# Patient Record
Sex: FEMALE | Race: WHITE | NOT HISPANIC OR LATINO | URBAN - METROPOLITAN AREA
[De-identification: names, ages, dates, MRNs, and addresses within clinical notes are randomized per-mention and may not be internally consistent; named-entity substitution may affect disease eponyms.]

---

## 2013-10-17 RX ORDER — OXYCODONE HYDROCHLORIDE 5 MG/1
1 TABLET ORAL
Qty: 0 | Refills: 0 | COMMUNITY
Start: 2013-10-17 | End: 2013-10-24

## 2016-02-04 RX ORDER — ASPIRIN/CALCIUM CARB/MAGNESIUM 324 MG
1 TABLET ORAL
Qty: 0 | Refills: 0 | COMMUNITY
Start: 2016-02-04

## 2016-10-31 RX ORDER — INSULIN ASPART 100 [IU]/ML
1 INJECTION, SOLUTION SUBCUTANEOUS
Qty: 0 | Refills: 0 | COMMUNITY
Start: 2016-10-31

## 2017-01-11 ENCOUNTER — INPATIENT (INPATIENT)
Facility: HOSPITAL | Age: 62
LOS: 3 days | Discharge: HOME | DRG: 981 | End: 2017-01-15
Attending: INTERNAL MEDICINE | Admitting: GENERAL ACUTE CARE HOSPITAL
Payer: MEDICARE

## 2017-01-11 VITALS
RESPIRATION RATE: 19 BRPM | HEART RATE: 81 BPM | DIASTOLIC BLOOD PRESSURE: 62 MMHG | SYSTOLIC BLOOD PRESSURE: 153 MMHG | OXYGEN SATURATION: 99 %

## 2017-01-11 DIAGNOSIS — E13.10 OTHER SPECIFIED DIABETES MELLITUS WITH KETOACIDOSIS WITHOUT COMA: ICD-10-CM

## 2017-01-11 DIAGNOSIS — N18.6 END STAGE RENAL DISEASE: ICD-10-CM

## 2017-01-11 DIAGNOSIS — E78.5 HYPERLIPIDEMIA, UNSPECIFIED: ICD-10-CM

## 2017-01-11 DIAGNOSIS — E10.10 TYPE 1 DIABETES MELLITUS WITH KETOACIDOSIS WITHOUT COMA: ICD-10-CM

## 2017-01-11 DIAGNOSIS — I73.9 PERIPHERAL VASCULAR DISEASE, UNSPECIFIED: ICD-10-CM

## 2017-01-11 DIAGNOSIS — I10 ESSENTIAL (PRIMARY) HYPERTENSION: ICD-10-CM

## 2017-01-11 DIAGNOSIS — Z98.89 OTHER SPECIFIED POSTPROCEDURAL STATES: Chronic | ICD-10-CM

## 2017-01-11 DIAGNOSIS — I25.10 ATHEROSCLEROTIC HEART DISEASE OF NATIVE CORONARY ARTERY WITHOUT ANGINA PECTORIS: ICD-10-CM

## 2017-01-11 DIAGNOSIS — I82.90 ACUTE EMBOLISM AND THROMBOSIS OF UNSPECIFIED VEIN: ICD-10-CM

## 2017-01-11 DIAGNOSIS — I24.9 ACUTE ISCHEMIC HEART DISEASE, UNSPECIFIED: ICD-10-CM

## 2017-01-11 LAB
ACETONE SERPL-MCNC: ABNORMAL
ALBUMIN SERPL ELPH-MCNC: 4.2 G/DL — SIGNIFICANT CHANGE UP (ref 3.3–5)
ALP SERPL-CCNC: 211 U/L — HIGH (ref 40–120)
ALT FLD-CCNC: 13 U/L RC — SIGNIFICANT CHANGE UP (ref 10–45)
ANION GAP SERPL CALC-SCNC: 14 MMOL/L — SIGNIFICANT CHANGE UP (ref 5–17)
ANION GAP SERPL CALC-SCNC: 19 MMOL/L — HIGH (ref 5–17)
ANION GAP SERPL CALC-SCNC: 27 MMOL/L — HIGH (ref 5–17)
ANION GAP SERPL CALC-SCNC: 27 MMOL/L — HIGH (ref 5–17)
ANION GAP SERPL CALC-SCNC: 30 MMOL/L — HIGH (ref 5–17)
APTT BLD: 32.2 SEC — SIGNIFICANT CHANGE UP (ref 27.5–37.4)
AST SERPL-CCNC: 15 U/L — SIGNIFICANT CHANGE UP (ref 10–40)
BASOPHILS # BLD AUTO: 0 K/UL — SIGNIFICANT CHANGE UP (ref 0–0.2)
BASOPHILS NFR BLD AUTO: 0.4 % — SIGNIFICANT CHANGE UP (ref 0–2)
BILIRUB SERPL-MCNC: 0.4 MG/DL — SIGNIFICANT CHANGE UP (ref 0.2–1.2)
BUN SERPL-MCNC: 19 MG/DL — SIGNIFICANT CHANGE UP (ref 7–23)
BUN SERPL-MCNC: 34 MG/DL — HIGH (ref 7–23)
BUN SERPL-MCNC: 37 MG/DL — HIGH (ref 7–23)
BUN SERPL-MCNC: 42 MG/DL — HIGH (ref 7–23)
BUN SERPL-MCNC: 9 MG/DL — SIGNIFICANT CHANGE UP (ref 7–23)
CALCIUM SERPL-MCNC: 8.9 MG/DL — SIGNIFICANT CHANGE UP (ref 8.4–10.5)
CALCIUM SERPL-MCNC: 9.2 MG/DL — SIGNIFICANT CHANGE UP (ref 8.4–10.5)
CALCIUM SERPL-MCNC: 9.4 MG/DL — SIGNIFICANT CHANGE UP (ref 8.4–10.5)
CALCIUM SERPL-MCNC: 9.5 MG/DL — SIGNIFICANT CHANGE UP (ref 8.4–10.5)
CALCIUM SERPL-MCNC: 9.9 MG/DL — SIGNIFICANT CHANGE UP (ref 8.4–10.5)
CHLORIDE SERPL-SCNC: 85 MMOL/L — LOW (ref 96–108)
CHLORIDE SERPL-SCNC: 85 MMOL/L — LOW (ref 96–108)
CHLORIDE SERPL-SCNC: 87 MMOL/L — LOW (ref 96–108)
CHLORIDE SERPL-SCNC: 94 MMOL/L — LOW (ref 96–108)
CHLORIDE SERPL-SCNC: 95 MMOL/L — LOW (ref 96–108)
CK MB BLD-MCNC: 3.1 % — SIGNIFICANT CHANGE UP (ref 0–3.5)
CK MB BLD-MCNC: 8.5 % — HIGH (ref 0–3.5)
CK MB BLD-MCNC: 9.6 % — HIGH (ref 0–3.5)
CK MB CFR SERPL CALC: 25.8 NG/ML — HIGH (ref 0–3.8)
CK MB CFR SERPL CALC: 3.5 NG/ML — SIGNIFICANT CHANGE UP (ref 0–3.8)
CK MB CFR SERPL CALC: 32.7 NG/ML — HIGH (ref 0–3.8)
CK SERPL-CCNC: 113 U/L — SIGNIFICANT CHANGE UP (ref 25–170)
CK SERPL-CCNC: 304 U/L — HIGH (ref 25–170)
CK SERPL-CCNC: 341 U/L — HIGH (ref 25–170)
CO2 SERPL-SCNC: 19 MMOL/L — LOW (ref 22–31)
CO2 SERPL-SCNC: 20 MMOL/L — LOW (ref 22–31)
CO2 SERPL-SCNC: 21 MMOL/L — LOW (ref 22–31)
CO2 SERPL-SCNC: 25 MMOL/L — SIGNIFICANT CHANGE UP (ref 22–31)
CO2 SERPL-SCNC: 30 MMOL/L — SIGNIFICANT CHANGE UP (ref 22–31)
CREAT SERPL-MCNC: 2.83 MG/DL — HIGH (ref 0.5–1.3)
CREAT SERPL-MCNC: 3.54 MG/DL — HIGH (ref 0.5–1.3)
CREAT SERPL-MCNC: 6.42 MG/DL — HIGH (ref 0.5–1.3)
CREAT SERPL-MCNC: 6.59 MG/DL — HIGH (ref 0.5–1.3)
CREAT SERPL-MCNC: 7 MG/DL — HIGH (ref 0.5–1.3)
EOSINOPHIL # BLD AUTO: 0 K/UL — SIGNIFICANT CHANGE UP (ref 0–0.5)
EOSINOPHIL NFR BLD AUTO: 0.4 % — SIGNIFICANT CHANGE UP (ref 0–6)
GAS PNL BLDV: SIGNIFICANT CHANGE UP
GLUCOSE SERPL-MCNC: 164 MG/DL — HIGH (ref 70–99)
GLUCOSE SERPL-MCNC: 260 MG/DL — HIGH (ref 70–99)
GLUCOSE SERPL-MCNC: 663 MG/DL — CRITICAL HIGH (ref 70–99)
GLUCOSE SERPL-MCNC: 777 MG/DL — CRITICAL HIGH (ref 70–99)
GLUCOSE SERPL-MCNC: 819 MG/DL — CRITICAL HIGH (ref 70–99)
HAV IGM SER-ACNC: SIGNIFICANT CHANGE UP
HBV CORE AB SER-ACNC: SIGNIFICANT CHANGE UP
HBV CORE IGM SER-ACNC: SIGNIFICANT CHANGE UP
HBV SURFACE AB SER-ACNC: <3 MIU/ML — LOW
HBV SURFACE AG SER-ACNC: SIGNIFICANT CHANGE UP
HCT VFR BLD CALC: 27.8 % — LOW (ref 34.5–45)
HCV AB S/CO SERPL IA: 0.14 S/CO — SIGNIFICANT CHANGE UP
HCV AB SERPL-IMP: SIGNIFICANT CHANGE UP
HGB BLD-MCNC: 8.9 G/DL — LOW (ref 11.5–15.5)
INR BLD: 1.06 RATIO — SIGNIFICANT CHANGE UP (ref 0.88–1.16)
LYMPHOCYTES # BLD AUTO: 0.9 K/UL — LOW (ref 1–3.3)
LYMPHOCYTES # BLD AUTO: 11.7 % — LOW (ref 13–44)
MAGNESIUM SERPL-MCNC: 1.9 MG/DL — SIGNIFICANT CHANGE UP (ref 1.6–2.6)
MAGNESIUM SERPL-MCNC: 2.1 MG/DL — SIGNIFICANT CHANGE UP (ref 1.6–2.6)
MCHC RBC-ENTMCNC: 32.2 GM/DL — SIGNIFICANT CHANGE UP (ref 32–36)
MCHC RBC-ENTMCNC: 34.4 PG — HIGH (ref 27–34)
MCV RBC AUTO: 107 FL — HIGH (ref 80–100)
MONOCYTES # BLD AUTO: 0.5 K/UL — SIGNIFICANT CHANGE UP (ref 0–0.9)
MONOCYTES NFR BLD AUTO: 6.1 % — SIGNIFICANT CHANGE UP (ref 2–14)
NEUTROPHILS # BLD AUTO: 6.4 K/UL — SIGNIFICANT CHANGE UP (ref 1.8–7.4)
NEUTROPHILS NFR BLD AUTO: 81.4 % — HIGH (ref 43–77)
NT-PROBNP SERPL-SCNC: HIGH PG/ML (ref 0–300)
PHOSPHATE SERPL-MCNC: 2.3 MG/DL — LOW (ref 2.5–4.5)
PHOSPHATE SERPL-MCNC: 4.8 MG/DL — HIGH (ref 2.5–4.5)
PLATELET # BLD AUTO: 217 K/UL — SIGNIFICANT CHANGE UP (ref 150–400)
POTASSIUM SERPL-MCNC: 3.4 MMOL/L — LOW (ref 3.5–5.3)
POTASSIUM SERPL-MCNC: 4.5 MMOL/L — SIGNIFICANT CHANGE UP (ref 3.5–5.3)
POTASSIUM SERPL-MCNC: 4.7 MMOL/L — SIGNIFICANT CHANGE UP (ref 3.5–5.3)
POTASSIUM SERPL-MCNC: 5.1 MMOL/L — SIGNIFICANT CHANGE UP (ref 3.5–5.3)
POTASSIUM SERPL-MCNC: 6.9 MMOL/L — CRITICAL HIGH (ref 3.5–5.3)
POTASSIUM SERPL-SCNC: 3.4 MMOL/L — LOW (ref 3.5–5.3)
POTASSIUM SERPL-SCNC: 4.5 MMOL/L — SIGNIFICANT CHANGE UP (ref 3.5–5.3)
POTASSIUM SERPL-SCNC: 4.7 MMOL/L — SIGNIFICANT CHANGE UP (ref 3.5–5.3)
POTASSIUM SERPL-SCNC: 5.1 MMOL/L — SIGNIFICANT CHANGE UP (ref 3.5–5.3)
POTASSIUM SERPL-SCNC: 6.9 MMOL/L — CRITICAL HIGH (ref 3.5–5.3)
PROT SERPL-MCNC: 7.2 G/DL — SIGNIFICANT CHANGE UP (ref 6–8.3)
PROTHROM AB SERPL-ACNC: 11.6 SEC — SIGNIFICANT CHANGE UP (ref 10–13.1)
RAPID RVP RESULT: SIGNIFICANT CHANGE UP
RBC # BLD: 2.6 M/UL — LOW (ref 3.8–5.2)
RBC # FLD: 12 % — SIGNIFICANT CHANGE UP (ref 10.3–14.5)
SODIUM SERPL-SCNC: 132 MMOL/L — LOW (ref 135–145)
SODIUM SERPL-SCNC: 134 MMOL/L — LOW (ref 135–145)
SODIUM SERPL-SCNC: 135 MMOL/L — SIGNIFICANT CHANGE UP (ref 135–145)
SODIUM SERPL-SCNC: 138 MMOL/L — SIGNIFICANT CHANGE UP (ref 135–145)
SODIUM SERPL-SCNC: 139 MMOL/L — SIGNIFICANT CHANGE UP (ref 135–145)
TROPONIN T SERPL-MCNC: 0.13 NG/ML — HIGH (ref 0–0.06)
TROPONIN T SERPL-MCNC: 0.84 NG/ML — HIGH (ref 0–0.06)
TROPONIN T SERPL-MCNC: 1.55 NG/ML — HIGH (ref 0–0.06)
WBC # BLD: 7.9 K/UL — SIGNIFICANT CHANGE UP (ref 3.8–10.5)
WBC # FLD AUTO: 7.9 K/UL — SIGNIFICANT CHANGE UP (ref 3.8–10.5)

## 2017-01-11 PROCEDURE — 93306 TTE W/DOPPLER COMPLETE: CPT | Mod: 26

## 2017-01-11 PROCEDURE — 99223 1ST HOSP IP/OBS HIGH 75: CPT

## 2017-01-11 PROCEDURE — 99291 CRITICAL CARE FIRST HOUR: CPT | Mod: 25,GC

## 2017-01-11 PROCEDURE — 71275 CT ANGIOGRAPHY CHEST: CPT | Mod: 26

## 2017-01-11 PROCEDURE — 71010: CPT | Mod: 26

## 2017-01-11 PROCEDURE — 93010 ELECTROCARDIOGRAM REPORT: CPT

## 2017-01-11 PROCEDURE — 74174 CTA ABD&PLVS W/CONTRAST: CPT | Mod: 26

## 2017-01-11 PROCEDURE — 99291 CRITICAL CARE FIRST HOUR: CPT

## 2017-01-11 RX ORDER — INSULIN LISPRO 100/ML
3 VIAL (ML) SUBCUTANEOUS
Qty: 0 | Refills: 0 | Status: DISCONTINUED | OUTPATIENT
Start: 2017-01-11 | End: 2017-01-12

## 2017-01-11 RX ORDER — DULOXETINE HYDROCHLORIDE 30 MG/1
60 CAPSULE, DELAYED RELEASE ORAL DAILY
Qty: 0 | Refills: 0 | Status: DISCONTINUED | OUTPATIENT
Start: 2017-01-11 | End: 2017-01-15

## 2017-01-11 RX ORDER — INSULIN HUMAN 100 [IU]/ML
10 INJECTION, SOLUTION SUBCUTANEOUS
Qty: 100 | Refills: 0 | Status: DISCONTINUED | OUTPATIENT
Start: 2017-01-11 | End: 2017-01-11

## 2017-01-11 RX ORDER — INSULIN GLARGINE 100 [IU]/ML
12 INJECTION, SOLUTION SUBCUTANEOUS ONCE
Qty: 0 | Refills: 0 | Status: COMPLETED | OUTPATIENT
Start: 2017-01-11 | End: 2017-01-11

## 2017-01-11 RX ORDER — INSULIN GLARGINE 100 [IU]/ML
12 INJECTION, SOLUTION SUBCUTANEOUS AT BEDTIME
Qty: 0 | Refills: 0 | Status: DISCONTINUED | OUTPATIENT
Start: 2017-01-12 | End: 2017-01-12

## 2017-01-11 RX ORDER — DEXTROSE 50 % IN WATER 50 %
12.5 SYRINGE (ML) INTRAVENOUS ONCE
Qty: 0 | Refills: 0 | Status: DISCONTINUED | OUTPATIENT
Start: 2017-01-11 | End: 2017-01-15

## 2017-01-11 RX ORDER — INSULIN HUMAN 100 [IU]/ML
10 INJECTION, SOLUTION SUBCUTANEOUS ONCE
Qty: 0 | Refills: 0 | Status: COMPLETED | OUTPATIENT
Start: 2017-01-11 | End: 2017-01-11

## 2017-01-11 RX ORDER — METOPROLOL TARTRATE 50 MG
50 TABLET ORAL DAILY
Qty: 0 | Refills: 0 | Status: DISCONTINUED | OUTPATIENT
Start: 2017-01-11 | End: 2017-01-13

## 2017-01-11 RX ORDER — POTASSIUM CHLORIDE 20 MEQ
20 PACKET (EA) ORAL ONCE
Qty: 0 | Refills: 0 | Status: COMPLETED | OUTPATIENT
Start: 2017-01-11 | End: 2017-01-11

## 2017-01-11 RX ORDER — HEPARIN SODIUM 5000 [USP'U]/ML
5000 INJECTION INTRAVENOUS; SUBCUTANEOUS EVERY 8 HOURS
Qty: 0 | Refills: 0 | Status: DISCONTINUED | OUTPATIENT
Start: 2017-01-11 | End: 2017-01-15

## 2017-01-11 RX ORDER — CLOPIDOGREL BISULFATE 75 MG/1
75 TABLET, FILM COATED ORAL DAILY
Qty: 0 | Refills: 0 | Status: DISCONTINUED | OUTPATIENT
Start: 2017-01-11 | End: 2017-01-15

## 2017-01-11 RX ORDER — OXYCODONE HYDROCHLORIDE 5 MG/1
5 TABLET ORAL ONCE
Qty: 0 | Refills: 0 | Status: DISCONTINUED | OUTPATIENT
Start: 2017-01-11 | End: 2017-01-11

## 2017-01-11 RX ORDER — DEXTROSE 50 % IN WATER 50 %
1 SYRINGE (ML) INTRAVENOUS ONCE
Qty: 0 | Refills: 0 | Status: DISCONTINUED | OUTPATIENT
Start: 2017-01-11 | End: 2017-01-15

## 2017-01-11 RX ORDER — ASPIRIN/CALCIUM CARB/MAGNESIUM 324 MG
81 TABLET ORAL DAILY
Qty: 0 | Refills: 0 | Status: DISCONTINUED | OUTPATIENT
Start: 2017-01-11 | End: 2017-01-15

## 2017-01-11 RX ORDER — ATORVASTATIN CALCIUM 80 MG/1
20 TABLET, FILM COATED ORAL AT BEDTIME
Qty: 0 | Refills: 0 | Status: DISCONTINUED | OUTPATIENT
Start: 2017-01-11 | End: 2017-01-15

## 2017-01-11 RX ORDER — MORPHINE SULFATE 50 MG/1
4 CAPSULE, EXTENDED RELEASE ORAL ONCE
Qty: 0 | Refills: 0 | Status: DISCONTINUED | OUTPATIENT
Start: 2017-01-11 | End: 2017-01-11

## 2017-01-11 RX ORDER — INSULIN LISPRO 100/ML
VIAL (ML) SUBCUTANEOUS
Qty: 0 | Refills: 0 | Status: DISCONTINUED | OUTPATIENT
Start: 2017-01-11 | End: 2017-01-12

## 2017-01-11 RX ORDER — SODIUM CHLORIDE 9 MG/ML
1000 INJECTION INTRAMUSCULAR; INTRAVENOUS; SUBCUTANEOUS
Qty: 0 | Refills: 0 | Status: DISCONTINUED | OUTPATIENT
Start: 2017-01-11 | End: 2017-01-11

## 2017-01-11 RX ORDER — DEXTROSE 50 % IN WATER 50 %
25 SYRINGE (ML) INTRAVENOUS ONCE
Qty: 0 | Refills: 0 | Status: DISCONTINUED | OUTPATIENT
Start: 2017-01-11 | End: 2017-01-15

## 2017-01-11 RX ORDER — CINACALCET 30 MG/1
60 TABLET, FILM COATED ORAL DAILY
Qty: 0 | Refills: 0 | Status: DISCONTINUED | OUTPATIENT
Start: 2017-01-11 | End: 2017-01-15

## 2017-01-11 RX ORDER — ALBUTEROL 90 UG/1
10 AEROSOL, METERED ORAL ONCE
Qty: 0 | Refills: 0 | Status: COMPLETED | OUTPATIENT
Start: 2017-01-11 | End: 2017-01-11

## 2017-01-11 RX ORDER — GLUCAGON INJECTION, SOLUTION 0.5 MG/.1ML
1 INJECTION, SOLUTION SUBCUTANEOUS ONCE
Qty: 0 | Refills: 0 | Status: DISCONTINUED | OUTPATIENT
Start: 2017-01-11 | End: 2017-01-15

## 2017-01-11 RX ORDER — SODIUM CHLORIDE 9 MG/ML
1000 INJECTION, SOLUTION INTRAVENOUS
Qty: 0 | Refills: 0 | Status: DISCONTINUED | OUTPATIENT
Start: 2017-01-11 | End: 2017-01-15

## 2017-01-11 RX ORDER — SODIUM CHLORIDE 9 MG/ML
1000 INJECTION, SOLUTION INTRAVENOUS
Qty: 0 | Refills: 0 | Status: DISCONTINUED | OUTPATIENT
Start: 2017-01-11 | End: 2017-01-11

## 2017-01-11 RX ADMIN — ALBUTEROL 10 MILLIGRAM(S): 90 AEROSOL, METERED ORAL at 01:51

## 2017-01-11 RX ADMIN — Medication 3 UNIT(S): at 18:01

## 2017-01-11 RX ADMIN — OXYCODONE HYDROCHLORIDE 5 MILLIGRAM(S): 5 TABLET ORAL at 07:55

## 2017-01-11 RX ADMIN — INSULIN HUMAN 10 UNIT(S): 100 INJECTION, SOLUTION SUBCUTANEOUS at 01:49

## 2017-01-11 RX ADMIN — MORPHINE SULFATE 4 MILLIGRAM(S): 50 CAPSULE, EXTENDED RELEASE ORAL at 01:15

## 2017-01-11 RX ADMIN — Medication 3: at 18:01

## 2017-01-11 RX ADMIN — SODIUM CHLORIDE 100 MILLILITER(S): 9 INJECTION INTRAMUSCULAR; INTRAVENOUS; SUBCUTANEOUS at 06:54

## 2017-01-11 RX ADMIN — INSULIN GLARGINE 12 UNIT(S): 100 INJECTION, SOLUTION SUBCUTANEOUS at 11:35

## 2017-01-11 RX ADMIN — Medication 63.75 MILLIMOLE(S): at 11:53

## 2017-01-11 RX ADMIN — SODIUM CHLORIDE 100 MILLILITER(S): 9 INJECTION, SOLUTION INTRAVENOUS at 08:44

## 2017-01-11 RX ADMIN — HEPARIN SODIUM 5000 UNIT(S): 5000 INJECTION INTRAVENOUS; SUBCUTANEOUS at 21:46

## 2017-01-11 RX ADMIN — Medication 50 MILLIEQUIVALENT(S): at 11:53

## 2017-01-11 RX ADMIN — HEPARIN SODIUM 5000 UNIT(S): 5000 INJECTION INTRAVENOUS; SUBCUTANEOUS at 13:33

## 2017-01-11 RX ADMIN — Medication 81 MILLIGRAM(S): at 11:49

## 2017-01-11 RX ADMIN — Medication 1: at 13:54

## 2017-01-11 RX ADMIN — ATORVASTATIN CALCIUM 20 MILLIGRAM(S): 80 TABLET, FILM COATED ORAL at 21:46

## 2017-01-11 RX ADMIN — DULOXETINE HYDROCHLORIDE 60 MILLIGRAM(S): 30 CAPSULE, DELAYED RELEASE ORAL at 11:51

## 2017-01-11 RX ADMIN — OXYCODONE HYDROCHLORIDE 5 MILLIGRAM(S): 5 TABLET ORAL at 06:42

## 2017-01-11 RX ADMIN — CINACALCET 60 MILLIGRAM(S): 30 TABLET, FILM COATED ORAL at 11:52

## 2017-01-11 RX ADMIN — Medication 3 UNIT(S): at 13:54

## 2017-01-11 RX ADMIN — CLOPIDOGREL BISULFATE 75 MILLIGRAM(S): 75 TABLET, FILM COATED ORAL at 11:51

## 2017-01-11 RX ADMIN — MORPHINE SULFATE 4 MILLIGRAM(S): 50 CAPSULE, EXTENDED RELEASE ORAL at 01:49

## 2017-01-11 RX ADMIN — INSULIN HUMAN 10 UNIT(S)/HR: 100 INJECTION, SOLUTION SUBCUTANEOUS at 04:02

## 2017-01-11 RX ADMIN — Medication 50 MILLIGRAM(S): at 10:26

## 2017-01-11 NOTE — H&P ADULT. - PROBLEM SELECTOR PROBLEM 7
Prophylactic use of unfractionated heparin for venous thromboembolism (VTE) Hyperlipidemia, unspecified hyperlipidemia type

## 2017-01-11 NOTE — H&P ADULT. - PROBLEM SELECTOR PLAN 2
-positive trop w/ normal CK in setting of ESRD, EKG with ST depressions in V4-V6  -unstable angina more likely than NSTEMI   -trend Kyra, serial EKGs  -need to obtain old EKG for comparison, old EKGs wont load from [place]  -cards c/s -unstable angina and not NSTEMI initially, positive trop w/ normal CK in setting of ESRD, EKG with ST depressions in V4-V6  -trend Kyra, serial EKGs  -need to obtain old EKG for comparison, old EKGs wont load from [place]  -cards c/s

## 2017-01-11 NOTE — ED ADULT NURSE REASSESSMENT NOTE - NS ED NURSE REASSESS COMMENT FT1
FS repeated, showing critical high on glucometer, MD aware. Insulin drip maintained at 10units/hr as per MD instruction. Repeat BMP to be drawn and sent prior to pt transfer to MICU. MICU RN Lala aware through verbal handoff report.

## 2017-01-11 NOTE — ED ADULT NURSE NOTE - OBJECTIVE STATEMENT
60 yo F brought in by EMS c/o CP 9/10 beginning at 11:15PM. A&Ox3. As per EMS, pt took 6 enteric coated Aspirin but chewed them, EMS then gave 162mg chewable; pt also received zofran and 1 SL maryse prior to arrival. Pt has hx of 4 stents, 2 strokes, CABG in BL lower extremities. Also hx of diabetes, has an insulin pump, pt denies taking her insulin prior to dinner last night which was around 7:00PM. FS performed here, too high to read. 62 yo F brought in by EMS c/o CP 9/10 beginning at 11:15PM. A&Ox3. As per EMS, pt took 6 enteric coated Aspirin but chewed them, EMS then gave 162mg chewable; pt also received zofran and 1 SL maryse prior to arrival. Pt has hx of 4 stents, 2 strokes, CABG in BL lower extremities. Also hx of diabetes, has an insulin pump, pt denies taking her insulin prior to dinner last night which was around 7:00PM. FS performed here, too high to read. Pt received dialysis 4x a week every Tues/Wed/Thurs/Sat. Received dialysis today. 62 yo F brought in by EMS c/o CP 9/10 beginning at 11:15PM. A&Ox3. As per EMS, pt took 6 enteric coated Aspirin at home, EMS then gave 162mg chewable; pt also received zofran and 1 SL nitro prior to arrival. EMS also placed IV #20 in R AC. Pt has hx of 4 stents, 2 strokes, CABG in BL lower extremities. Also hx of diabetes, has an insulin pump, pt denies taking her insulin prior to dinner last night which was around 7:00PM. FS performed here, too high to read. Pt receives dialysis 4x a week every Tues/Wed/Thurs/Sat. Received dialysis today. Has a fistula to L upper extremity. Upon presentation, pt having sharp constant CP localized to L chest area non-radiating. Began when she got scared because "snow fell off the roof" and made a loud noise. Also c/o SOB and nausea. 1 episode of vomiting yesterday. Upon presentation pt O2 sat 99% on RA, then O2 sat to low 90s on RA; 2L NC established, O2 sat to 98%. EKG performed, pt placed on cardiac monitor. Safety and comfort provided.

## 2017-01-11 NOTE — DIETITIAN INITIAL EVALUATION ADULT. - PROBLEM SELECTOR PLAN 2
-unstable angina and not NSTEMI initially, positive trop w/ normal CK in setting of ESRD, EKG with ST depressions in V4-V6  -trend Kyra, serial EKGs  -need to obtain old EKG for comparison, old EKGs wont load from [place]  -cards c/s

## 2017-01-11 NOTE — ED PROVIDER NOTE - PMH
ACS (acute coronary syndrome)  1/2015 - cath revealed 100% ostial stenosis not amenable to PCI - medical management  CAD (coronary artery disease)    Cellulitis  2015 left foot  CVA (cerebral infarction)  with no residual, 8 yrs ago, prior to heart surgery - ST memory loss  Diabetes mellitus type 1  Insulin Dependent - Medtronic  Minimed Paradigm Insulin Pump - Novolog  DKA, type 1  1/2015  ESRD (end stage renal disease)  dialysis , tue, thursday, saturday  Gout  past  HTN (hypertension)    Hyperlipidemia    MI, old    Murmur, cardiac    Peripheral vascular disease  occluded left fem-pop bypass 5/2015  TIA (transient ischemic attack)  x 2 - 8-9 years ago prior to ASD/VSD repair

## 2017-01-11 NOTE — H&P ADULT. - FAMILY HISTORY
<<-----Click on this checkbox to enter Family History Family history of hypertension     Family history of smoking     Sibling  Still living? Yes, Estimated age: Age Unknown  Family history of cancer, Age at diagnosis: Age Unknown

## 2017-01-11 NOTE — H&P ADULT. - PROBLEM SELECTOR PROBLEM 6
Prophylactic use of unfractionated heparin for venous thromboembolism (VTE) Peripheral vascular disease

## 2017-01-11 NOTE — H&P ADULT. - PROBLEM SELECTOR PLAN 5
-oxy IR prn for pain -c/w metoprolol   -holding lisinopril and norvasc in setting of DKA -c/w metoprolol   -holding lisinopril, norvasc, lasix in setting of DKA

## 2017-01-11 NOTE — DIETITIAN INITIAL EVALUATION ADULT. - OTHER INFO
Pt seen for: consult for HD.   Adm dx: DKA   PMH:DM1, CAD, ESRD w/ HD TIW, CVA, gout, MI, HLD     GI issues: denies N/V/D/C   Last BM: 1/10   Food allergies: NKFA   Vit/supplement PTA: renvela, mvi

## 2017-01-11 NOTE — ED PROVIDER NOTE - CRITICAL CARE PROVIDED
consult w/ pt's family directly relating to pts condition/consultation with other physicians/direct patient care (not related to procedure)/additional history taking/interpretation of diagnostic studies/documentation

## 2017-01-11 NOTE — ED PROVIDER NOTE - CARE PLAN
Principal Discharge DX:	DKA (diabetic ketoacidoses) Principal Discharge DX:	DKA (diabetic ketoacidoses)  Secondary Diagnosis:	Coronary artery disease involving native coronary artery, angina presence unspecified, unspecified whether native or transplanted heart  Secondary Diagnosis:	Hyperkalemia

## 2017-01-11 NOTE — H&P ADULT. - LAB RESULTS AND INTERPRETATION
inc AG, glucose, serum acetone in setting of DKA   inc K however most likely K depleted   inc BNP, trop inc setting of CP however ESRD and normal CK

## 2017-01-11 NOTE — ED PROVIDER NOTE - OBJECTIVE STATEMENT
Cards  61F hx cad s/p stent cva dm htn esrd on dialysis twrs last dialysis today presents with chest pain. Pain started at 11:15 pm. Pt took 1 sublingual nitro with some improvement in pain. Pain 6/10. Pt took asa prior to arrival and ems gave asa as well. no pt complains of left sided chest pain severe sharp and abdominal pain all starting at 11 pm

## 2017-01-11 NOTE — ED ADULT NURSE REASSESSMENT NOTE - NS ED NURSE REASSESS COMMENT FT1
Pt repeat FS 30 minutes after administration of 10 units regular insulin reading high on the glucometer, MD aware. Repeat labs drawn and sent as per MD order. Endocrine team contacted, spoke with MD Vasquez; pt insulin pump to be disconnected and given to pt's  to take home. Insulin drip to be established.

## 2017-01-11 NOTE — DIETITIAN INITIAL EVALUATION ADULT. - ENERGY NEEDS
Ht: 64"   Wt: 134.2  BMI: 23 kg/m2   IBW: 120 (+/-10%)  112 %IBW  Edema: 1+ ankle  Skin: no pressure injuries

## 2017-01-11 NOTE — H&P ADULT. - PROBLEM SELECTOR PROBLEM 4
Coronary artery disease, angina presence unspecified, unspecified vessel or lesion type, unspecified whether native or transplanted heart

## 2017-01-11 NOTE — H&P ADULT. - ASSESSMENT
62 yo F w/ PMH of type 1 DM, CADx4 stents, MI 1/2015, ESRD (on HD TTS), CVA x3 ( weakness on left upper and lower extremities-uses cane to ambulate, short term memory loss), PVD with occluded fem-pop bypass 5/2015, ASD/VSD repair, p/w sudden onset L sided chest pain and abn pain, found to be in DKA in ED w/ EKG showing T-wave inversions in V4-6 and inc trop: 62 yo F w/ PMH of type 1 DM, CADx4 stents, MI 1/2015, ESRD (on HD TTS), CVA x3 ( weakness on left upper and lower extremities-uses cane to ambulate, short term memory loss), PVD with occluded fem-pop bypass 5/2015, ASD/VSD repair, p/w sudden onset L sided chest pain and abn pain, found to be in DKA in ED and w/ EKG ST depressions V4-V6 and inc trop in setting of ESRD:

## 2017-01-11 NOTE — ED PROVIDER NOTE - MEDICAL DECISION MAKING DETAILS
Dr. Vasquez (Attending Physician)  Pt. with ho cad s/p multiple stents dm, esrd, stroke pw midsternal chest pain since 11:15 pm.  TTP in chest, diffusely in abd, lungs clear.  ECG with ST depression in V4-V6. Glucose HI, will send trop to eval for ACS, CTA chest, vbg, bmp to eval for DKA vs. HONK, had HD today but will eval electrolytes.

## 2017-01-11 NOTE — ED ADULT NURSE NOTE - PSH
AV (arteriovenous fistula)  Left AV graft; revision with stent placement 2-3 years ago  History of cardiac catheterization  1/2015 - no intervention  S/P cholecystectomy    S/P femoral-popliteal bypass surgery  L and R in 2013 with graft; 5/2015 CFA angioplasty left and ileofemoral endarterectomywith vein patch angioplasty of left fem-pop bypass graft  Multiple vascular surgery both leg, left fempop bypass revision 11/2015  Status post device closure of ASD  "brenna"

## 2017-01-11 NOTE — ED PROVIDER NOTE - SECONDARY DIAGNOSIS.
Coronary artery disease involving native coronary artery, angina presence unspecified, unspecified whether native or transplanted heart Hyperkalemia

## 2017-01-11 NOTE — H&P ADULT. - RADIOLOGY RESULTS AND INTERPRETATION
CXR: no acute findings     CT C/A/P:    IMPRESSION:  No  aortic  dissection.  No  aneurysmal  dilatation  of  aorta.  No  intramural  hematoma.  Complete  occlusion  of  the  partially  imaged  left  common  femoral  vascular  stent  graft.    Nonspecific  nodular  and  groundglass  opacities  in  the  lungs,  most  predominant  in  the  left  lower  lobe.  Findings  may  infectious  versus  inflammatory.

## 2017-01-11 NOTE — DIETITIAN INITIAL EVALUATION ADULT. - PROBLEM SELECTOR PLAN 1
-unclear etiology, normal white count and afebrile, CXR WNL, doubt infection, could be ACS triggering DKA?   -titrate insulin gtt per DKA protocol, FS q1 hour   -BMP, Mg, Phos, q4 and replete lytes   -no fluid bolus in ED given ESRD, start IVF, NS @ 100 cc/hr  -endocrine c/s

## 2017-01-11 NOTE — H&P ADULT. - HISTORY OF PRESENT ILLNESS
60 yo F w/ PMH of type 1 DM (dx at age 21, on Novolog insulin pump, complicated by PVD, DKA 01/2015),CADx4 stents, MI 1/2015 (100% ostial stenosis on cath not amenable with PCI -medical management recommended), ESRD (on HD TTS managed by Dr Almonte, [facility], NICANOR AV graft), CVA x3 ( weakness on left upper and lower extremities-uses cane to ambulate, short term memory loss), PVD with occluded fem-pop bypass 5/2015, ASD/VSD repair, p/w sudden onset L sided chest pain @ 11 pm on 1/10/17. Described as sharp, non radiating, 8/10 in severity, constant in nature. Took sublingual nitroglycerin and ASA. Associated with SOB. Pt makes minimal urine. Underwent HD yesterday. Pt also c/o abdominal pain, N/V. Remaining ROS negative.    In ED 60 yo F w/ PMH of type 1 DM (dx at age 21, on Novolog insulin pump, complicated by PVD, DKA 01/2015),CADx4 stents, MI 1/2015 (100% ostial stenosis on cath not amenable with PCI -medical management recommended), ESRD (on HD TTS managed by Dr Almonte, [facility], NICANOR AV graft), CVA x3 ( weakness on left upper and lower extremities-uses cane to ambulate, short term memory loss), PVD with occluded fem-pop bypass 5/2015, ASD/VSD repair, p/w sudden onset L sided chest pain @ 11 pm on 1/10/17. Described as sharp, non radiating, 8/10 in severity, constant in nature. Took sublingual nitroglycerin and ASA. Associated with SOB. Pt makes minimal urine. Underwent HD yesterday. Pt also c/o abdominal pain, N/V. Remaining ROS negative.    In ED vitals initially afebrile w/ temp 98.3, /65, HR 86, RR 17, 98 on RA. WBC 7.9, hgb 8.9, large acetone, AG 29, , trop 0.13, BNP >35,000. EKG NSR w/ ST depressions V4-V6. 60 yo F w/ PMH of type 1 DM (dx at age 21, on Novolog insulin pump, complicated by PVD, DKA 01/2015),CADx4 stents, MI 1/2015 (100% ostial stenosis on cath not amenable with PCI -medical management recommended), ESRD (on HD TTS managed by Dr Almonte, [facility], NICANOR AV graft), CVA x3 ( weakness on left upper and lower extremities-uses cane to ambulate, short term memory loss), PVD with occluded fem-pop bypass 5/2015, ASD/VSD repair, p/w sudden onset L sided chest pain @ 11 pm on 1/10/17. Described as sharp, non radiating, 8/10 in severity, constant in nature. Took sublingual nitroglycerin and ASA. Associated with SOB. Pt makes minimal urine. Underwent HD yesterday. Pt also c/o abdominal pain, N/V. Remaining ROS negative.    In ED vitals initially afebrile w/ temp 98.3, /65, HR 86, RR 17, 98 on RA. WBC 7.9, hgb 8.9, large acetone, AG 29, , trop 0.13, BNP >35,000. EKG NSR w/ ST depressions V4-V6. Given oxy 5 mg IR, morphine 4 mg x1, albuterol tx, 10 units of insulin and started on insulin gtt.

## 2017-01-11 NOTE — DIETITIAN INITIAL EVALUATION ADULT. - ADHERENCE
Pt states that she avoids salt, is seen by RD at dialysis ,states awareness of renal and carb restrictions. Declined further written  info. On insulin pump states she counts carbs before dosing. Checks blood sugars 5x/day, unable to provide range but stated sometimes BG is 33 and can go as high as 800, sees endocrinologist q 3 months. Diet Recall: eats low [place], egg on a toasted bagel every day, Lunch  varies, Dinner usually just has meat, discussed adding carb sources at dinner to prevent hypoglycemia, verbalized understanding. Pt states that she avoids salt, limits fluids to 1 liter/day, is seen by RD at dialysis, states awareness of renal and carb restrictions. Declined further written  info. On insulin pump states she counts carbs before dosing. Checks blood sugars 5x/day, unable to provide range but stated sometimes BG is 33 and can go as high as 800, sees endocrinologist q 3 months. Diet Recall: eats low Na [place], egg on a toasted bagel every day, Lunch  varies, Dinner usually just has meat, discussed adding carb sources at dinner to prevent hypoglycemia, verbalized understanding.

## 2017-01-12 LAB
ALBUMIN SERPL ELPH-MCNC: 3.8 G/DL — SIGNIFICANT CHANGE UP (ref 3.3–5)
ALP SERPL-CCNC: 199 U/L — HIGH (ref 40–120)
ALT FLD-CCNC: 15 U/L — SIGNIFICANT CHANGE UP (ref 10–45)
ANION GAP SERPL CALC-SCNC: 18 MMOL/L — HIGH (ref 5–17)
AST SERPL-CCNC: 35 U/L — SIGNIFICANT CHANGE UP (ref 10–40)
BASOPHILS # BLD AUTO: 0.03 K/UL — SIGNIFICANT CHANGE UP (ref 0–0.2)
BASOPHILS NFR BLD AUTO: 0.6 % — SIGNIFICANT CHANGE UP (ref 0–2)
BILIRUB SERPL-MCNC: 0.3 MG/DL — SIGNIFICANT CHANGE UP (ref 0.2–1.2)
BUN SERPL-MCNC: 17 MG/DL — SIGNIFICANT CHANGE UP (ref 7–23)
CALCIUM SERPL-MCNC: 8.9 MG/DL — SIGNIFICANT CHANGE UP (ref 8.4–10.5)
CHLORIDE SERPL-SCNC: 92 MMOL/L — LOW (ref 96–108)
CK MB BLD-MCNC: 6.3 % — HIGH (ref 0–3.5)
CK MB BLD-MCNC: 7.5 % — HIGH (ref 0–3.5)
CK MB CFR SERPL CALC: 13.7 NG/ML — HIGH (ref 0–3.8)
CK MB CFR SERPL CALC: 18.4 NG/ML — HIGH (ref 0–3.8)
CK SERPL-CCNC: 217 U/L — HIGH (ref 25–170)
CK SERPL-CCNC: 245 U/L — HIGH (ref 25–170)
CO2 SERPL-SCNC: 25 MMOL/L — SIGNIFICANT CHANGE UP (ref 22–31)
CREAT SERPL-MCNC: 4.41 MG/DL — HIGH (ref 0.5–1.3)
EOSINOPHIL # BLD AUTO: 0.15 K/UL — SIGNIFICANT CHANGE UP (ref 0–0.5)
EOSINOPHIL NFR BLD AUTO: 3.1 % — SIGNIFICANT CHANGE UP (ref 0–6)
GLUCOSE SERPL-MCNC: 252 MG/DL — HIGH (ref 70–99)
HBA1C BLD-MCNC: 8 % — HIGH (ref 4–5.6)
HCT VFR BLD CALC: 28.5 % — LOW (ref 34.5–45)
HGB BLD-MCNC: 8.7 G/DL — LOW (ref 11.5–15.5)
IMM GRANULOCYTES NFR BLD AUTO: 0.2 % — SIGNIFICANT CHANGE UP (ref 0–1.5)
LYMPHOCYTES # BLD AUTO: 1.3 K/UL — SIGNIFICANT CHANGE UP (ref 1–3.3)
LYMPHOCYTES # BLD AUTO: 26.6 % — SIGNIFICANT CHANGE UP (ref 13–44)
MAGNESIUM SERPL-MCNC: 2 MG/DL — SIGNIFICANT CHANGE UP (ref 1.6–2.6)
MCHC RBC-ENTMCNC: 30.5 GM/DL — LOW (ref 32–36)
MCHC RBC-ENTMCNC: 31.9 PG — SIGNIFICANT CHANGE UP (ref 27–34)
MCV RBC AUTO: 104.4 FL — HIGH (ref 80–100)
MONOCYTES # BLD AUTO: 0.55 K/UL — SIGNIFICANT CHANGE UP (ref 0–0.9)
MONOCYTES NFR BLD AUTO: 11.2 % — SIGNIFICANT CHANGE UP (ref 2–14)
NEUTROPHILS # BLD AUTO: 2.85 K/UL — SIGNIFICANT CHANGE UP (ref 1.8–7.4)
NEUTROPHILS NFR BLD AUTO: 58.3 % — SIGNIFICANT CHANGE UP (ref 43–77)
PHOSPHATE SERPL-MCNC: 4.6 MG/DL — HIGH (ref 2.5–4.5)
PLATELET # BLD AUTO: 267 K/UL — SIGNIFICANT CHANGE UP (ref 150–400)
POTASSIUM SERPL-MCNC: 5.1 MMOL/L — SIGNIFICANT CHANGE UP (ref 3.5–5.3)
POTASSIUM SERPL-SCNC: 5.1 MMOL/L — SIGNIFICANT CHANGE UP (ref 3.5–5.3)
PROT SERPL-MCNC: 6.7 G/DL — SIGNIFICANT CHANGE UP (ref 6–8.3)
RBC # BLD: 2.73 M/UL — LOW (ref 3.8–5.2)
RBC # FLD: 12.9 % — SIGNIFICANT CHANGE UP (ref 10.3–14.5)
SODIUM SERPL-SCNC: 135 MMOL/L — SIGNIFICANT CHANGE UP (ref 135–145)
TROPONIN T SERPL-MCNC: 1.93 NG/ML — HIGH (ref 0–0.06)
TROPONIN T SERPL-MCNC: 2.15 NG/ML — HIGH (ref 0–0.06)
WBC # BLD: 4.89 K/UL — SIGNIFICANT CHANGE UP (ref 3.8–10.5)
WBC # FLD AUTO: 4.89 K/UL — SIGNIFICANT CHANGE UP (ref 3.8–10.5)

## 2017-01-12 PROCEDURE — 99232 SBSQ HOSP IP/OBS MODERATE 35: CPT | Mod: GC

## 2017-01-12 PROCEDURE — 93010 ELECTROCARDIOGRAM REPORT: CPT

## 2017-01-12 RX ORDER — INSULIN LISPRO 100/ML
VIAL (ML) SUBCUTANEOUS
Qty: 0 | Refills: 0 | Status: DISCONTINUED | OUTPATIENT
Start: 2017-01-12 | End: 2017-01-13

## 2017-01-12 RX ORDER — INSULIN GLARGINE 100 [IU]/ML
14 INJECTION, SOLUTION SUBCUTANEOUS
Qty: 0 | Refills: 0 | Status: DISCONTINUED | OUTPATIENT
Start: 2017-01-12 | End: 2017-01-13

## 2017-01-12 RX ORDER — INSULIN LISPRO 100/ML
4 VIAL (ML) SUBCUTANEOUS
Qty: 0 | Refills: 0 | Status: DISCONTINUED | OUTPATIENT
Start: 2017-01-12 | End: 2017-01-12

## 2017-01-12 RX ORDER — INSULIN LISPRO 100/ML
3 VIAL (ML) SUBCUTANEOUS
Qty: 0 | Refills: 0 | Status: DISCONTINUED | OUTPATIENT
Start: 2017-01-12 | End: 2017-01-13

## 2017-01-12 RX ORDER — SODIUM CHLORIDE 9 MG/ML
1000 INJECTION INTRAMUSCULAR; INTRAVENOUS; SUBCUTANEOUS
Qty: 0 | Refills: 0 | Status: DISCONTINUED | OUTPATIENT
Start: 2017-01-12 | End: 2017-01-15

## 2017-01-12 RX ADMIN — Medication 3 UNIT(S): at 18:20

## 2017-01-12 RX ADMIN — CINACALCET 60 MILLIGRAM(S): 30 TABLET, FILM COATED ORAL at 18:20

## 2017-01-12 RX ADMIN — INSULIN GLARGINE 14 UNIT(S): 100 INJECTION, SOLUTION SUBCUTANEOUS at 16:23

## 2017-01-12 RX ADMIN — Medication 3: at 07:54

## 2017-01-12 RX ADMIN — ATORVASTATIN CALCIUM 20 MILLIGRAM(S): 80 TABLET, FILM COATED ORAL at 22:36

## 2017-01-12 RX ADMIN — DULOXETINE HYDROCHLORIDE 60 MILLIGRAM(S): 30 CAPSULE, DELAYED RELEASE ORAL at 18:20

## 2017-01-12 RX ADMIN — CLOPIDOGREL BISULFATE 75 MILLIGRAM(S): 75 TABLET, FILM COATED ORAL at 09:47

## 2017-01-12 RX ADMIN — SODIUM CHLORIDE 75 MILLILITER(S): 9 INJECTION INTRAMUSCULAR; INTRAVENOUS; SUBCUTANEOUS at 16:50

## 2017-01-12 RX ADMIN — Medication 3 UNIT(S): at 07:55

## 2017-01-12 RX ADMIN — Medication 50 MILLIGRAM(S): at 05:42

## 2017-01-12 RX ADMIN — HEPARIN SODIUM 5000 UNIT(S): 5000 INJECTION INTRAVENOUS; SUBCUTANEOUS at 05:42

## 2017-01-12 RX ADMIN — Medication 81 MILLIGRAM(S): at 09:47

## 2017-01-13 LAB
CK MB BLD-MCNC: 7.4 % — HIGH (ref 0–3.5)
CK MB CFR SERPL CALC: 23.2 NG/ML — HIGH (ref 0–3.8)
CK SERPL-CCNC: 313 U/L — HIGH (ref 25–170)
TROPONIN T SERPL-MCNC: 2.14 NG/ML — HIGH (ref 0–0.06)

## 2017-01-13 PROCEDURE — 99233 SBSQ HOSP IP/OBS HIGH 50: CPT

## 2017-01-13 RX ORDER — ERYTHROPOIETIN 10000 [IU]/ML
10000 INJECTION, SOLUTION INTRAVENOUS; SUBCUTANEOUS ONCE
Qty: 0 | Refills: 0 | Status: COMPLETED | OUTPATIENT
Start: 2017-01-14 | End: 2017-01-14

## 2017-01-13 RX ORDER — INSULIN ASPART 100 [IU]/ML
1 INJECTION, SOLUTION SUBCUTANEOUS
Qty: 0 | Refills: 0 | Status: DISCONTINUED | OUTPATIENT
Start: 2017-01-13 | End: 2017-01-14

## 2017-01-13 RX ORDER — METOPROLOL TARTRATE 50 MG
50 TABLET ORAL
Qty: 0 | Refills: 0 | Status: DISCONTINUED | OUTPATIENT
Start: 2017-01-13 | End: 2017-01-15

## 2017-01-13 RX ORDER — INSULIN ASPART 100 [IU]/ML
1 INJECTION, SOLUTION SUBCUTANEOUS
Qty: 0 | Refills: 0 | Status: DISCONTINUED | OUTPATIENT
Start: 2017-01-13 | End: 2017-01-13

## 2017-01-13 RX ADMIN — Medication 3 UNIT(S): at 12:39

## 2017-01-13 RX ADMIN — Medication 3 UNIT(S): at 08:21

## 2017-01-13 RX ADMIN — DULOXETINE HYDROCHLORIDE 60 MILLIGRAM(S): 30 CAPSULE, DELAYED RELEASE ORAL at 12:41

## 2017-01-13 RX ADMIN — CINACALCET 60 MILLIGRAM(S): 30 TABLET, FILM COATED ORAL at 12:40

## 2017-01-13 RX ADMIN — HEPARIN SODIUM 5000 UNIT(S): 5000 INJECTION INTRAVENOUS; SUBCUTANEOUS at 05:27

## 2017-01-13 RX ADMIN — Medication 50 MILLIGRAM(S): at 18:17

## 2017-01-13 RX ADMIN — INSULIN ASPART 1 EACH: 100 INJECTION, SOLUTION SUBCUTANEOUS at 14:49

## 2017-01-13 RX ADMIN — Medication 81 MILLIGRAM(S): at 12:41

## 2017-01-13 RX ADMIN — Medication 1: at 08:21

## 2017-01-13 RX ADMIN — ATORVASTATIN CALCIUM 20 MILLIGRAM(S): 80 TABLET, FILM COATED ORAL at 21:01

## 2017-01-13 RX ADMIN — CLOPIDOGREL BISULFATE 75 MILLIGRAM(S): 75 TABLET, FILM COATED ORAL at 12:41

## 2017-01-13 RX ADMIN — Medication 50 MILLIGRAM(S): at 05:27

## 2017-01-13 RX ADMIN — HEPARIN SODIUM 5000 UNIT(S): 5000 INJECTION INTRAVENOUS; SUBCUTANEOUS at 14:49

## 2017-01-13 RX ADMIN — Medication 1: at 12:39

## 2017-01-13 RX ADMIN — INSULIN ASPART 1 EACH: 100 INJECTION, SOLUTION SUBCUTANEOUS at 21:31

## 2017-01-13 RX ADMIN — HEPARIN SODIUM 5000 UNIT(S): 5000 INJECTION INTRAVENOUS; SUBCUTANEOUS at 21:01

## 2017-01-14 ENCOUNTER — TRANSCRIPTION ENCOUNTER (OUTPATIENT)
Age: 62
End: 2017-01-14

## 2017-01-14 LAB
ANION GAP SERPL CALC-SCNC: 15 MMOL/L — SIGNIFICANT CHANGE UP (ref 5–17)
BUN SERPL-MCNC: 26 MG/DL — HIGH (ref 7–23)
CALCIUM SERPL-MCNC: 8.4 MG/DL — SIGNIFICANT CHANGE UP (ref 8.4–10.5)
CHLORIDE SERPL-SCNC: 94 MMOL/L — LOW (ref 96–108)
CK MB BLD-MCNC: 4.1 % — HIGH (ref 0–3.5)
CK MB BLD-MCNC: 4.1 % — HIGH (ref 0–3.5)
CK MB CFR SERPL CALC: 7 NG/ML — HIGH (ref 0–3.8)
CK MB CFR SERPL CALC: 7.4 NG/ML — HIGH (ref 0–3.8)
CK SERPL-CCNC: 171 U/L — HIGH (ref 25–170)
CK SERPL-CCNC: 180 U/L — HIGH (ref 25–170)
CO2 SERPL-SCNC: 25 MMOL/L — SIGNIFICANT CHANGE UP (ref 22–31)
CREAT SERPL-MCNC: 5.13 MG/DL — HIGH (ref 0.5–1.3)
GLUCOSE SERPL-MCNC: 31 MG/DL — CRITICAL LOW (ref 70–99)
HCT VFR BLD CALC: 27.2 % — LOW (ref 34.5–45)
HGB BLD-MCNC: 8.5 G/DL — LOW (ref 11.5–15.5)
MCHC RBC-ENTMCNC: 31.3 GM/DL — LOW (ref 32–36)
MCHC RBC-ENTMCNC: 32.3 PG — SIGNIFICANT CHANGE UP (ref 27–34)
MCV RBC AUTO: 103.4 FL — HIGH (ref 80–100)
PLATELET # BLD AUTO: 259 K/UL — SIGNIFICANT CHANGE UP (ref 150–400)
POTASSIUM SERPL-MCNC: 4.1 MMOL/L — SIGNIFICANT CHANGE UP (ref 3.5–5.3)
POTASSIUM SERPL-SCNC: 4.1 MMOL/L — SIGNIFICANT CHANGE UP (ref 3.5–5.3)
RBC # BLD: 2.63 M/UL — LOW (ref 3.8–5.2)
RBC # FLD: 12.6 % — SIGNIFICANT CHANGE UP (ref 10.3–14.5)
SODIUM SERPL-SCNC: 134 MMOL/L — LOW (ref 135–145)
TROPONIN T SERPL-MCNC: 2.74 NG/ML — HIGH (ref 0–0.06)
TROPONIN T SERPL-MCNC: 4.14 NG/ML — HIGH (ref 0–0.06)
WBC # BLD: 4.16 K/UL — SIGNIFICANT CHANGE UP (ref 3.8–10.5)
WBC # FLD AUTO: 4.16 K/UL — SIGNIFICANT CHANGE UP (ref 3.8–10.5)

## 2017-01-14 RX ORDER — DEXTROSE 50 % IN WATER 50 %
12.5 SYRINGE (ML) INTRAVENOUS ONCE
Qty: 0 | Refills: 0 | Status: DISCONTINUED | OUTPATIENT
Start: 2017-01-14 | End: 2017-01-15

## 2017-01-14 RX ORDER — INSULIN ASPART 100 [IU]/ML
1 INJECTION, SOLUTION SUBCUTANEOUS
Qty: 0 | Refills: 0 | Status: DISCONTINUED | OUTPATIENT
Start: 2017-01-14 | End: 2017-01-15

## 2017-01-14 RX ORDER — DEXTROSE 50 % IN WATER 50 %
1 SYRINGE (ML) INTRAVENOUS ONCE
Qty: 0 | Refills: 0 | Status: COMPLETED | OUTPATIENT
Start: 2017-01-14 | End: 2017-01-14

## 2017-01-14 RX ORDER — DEXTROSE 50 % IN WATER 50 %
25 SYRINGE (ML) INTRAVENOUS ONCE
Qty: 0 | Refills: 0 | Status: DISCONTINUED | OUTPATIENT
Start: 2017-01-14 | End: 2017-01-15

## 2017-01-14 RX ADMIN — HEPARIN SODIUM 5000 UNIT(S): 5000 INJECTION INTRAVENOUS; SUBCUTANEOUS at 22:11

## 2017-01-14 RX ADMIN — HEPARIN SODIUM 5000 UNIT(S): 5000 INJECTION INTRAVENOUS; SUBCUTANEOUS at 06:55

## 2017-01-14 RX ADMIN — INSULIN ASPART 1 EACH: 100 INJECTION, SOLUTION SUBCUTANEOUS at 21:17

## 2017-01-14 RX ADMIN — Medication 50 MILLIGRAM(S): at 22:11

## 2017-01-14 RX ADMIN — ATORVASTATIN CALCIUM 20 MILLIGRAM(S): 80 TABLET, FILM COATED ORAL at 22:11

## 2017-01-14 RX ADMIN — INSULIN ASPART 1 EACH: 100 INJECTION, SOLUTION SUBCUTANEOUS at 12:36

## 2017-01-14 RX ADMIN — CINACALCET 60 MILLIGRAM(S): 30 TABLET, FILM COATED ORAL at 11:21

## 2017-01-14 RX ADMIN — Medication 50 MILLIGRAM(S): at 06:55

## 2017-01-14 RX ADMIN — ERYTHROPOIETIN 10000 UNIT(S): 10000 INJECTION, SOLUTION INTRAVENOUS; SUBCUTANEOUS at 15:36

## 2017-01-14 RX ADMIN — DULOXETINE HYDROCHLORIDE 60 MILLIGRAM(S): 30 CAPSULE, DELAYED RELEASE ORAL at 11:21

## 2017-01-14 RX ADMIN — Medication 81 MILLIGRAM(S): at 11:21

## 2017-01-14 RX ADMIN — INSULIN ASPART 1 EACH: 100 INJECTION, SOLUTION SUBCUTANEOUS at 00:31

## 2017-01-14 RX ADMIN — HEPARIN SODIUM 5000 UNIT(S): 5000 INJECTION INTRAVENOUS; SUBCUTANEOUS at 13:32

## 2017-01-14 RX ADMIN — CLOPIDOGREL BISULFATE 75 MILLIGRAM(S): 75 TABLET, FILM COATED ORAL at 11:21

## 2017-01-14 RX ADMIN — Medication 1 DOSE(S): at 08:27

## 2017-01-14 NOTE — DISCHARGE NOTE ADULT - MEDICATION SUMMARY - MEDICATIONS TO STOP TAKING
I will STOP taking the medications listed below when I get home from the hospital:    lisinopril 40 mg oral tablet  -- 1 tab(s) by mouth once a day    amLODIPine 10 mg oral tablet  -- 1 tab(s) by mouth once a day

## 2017-01-14 NOTE — PROVIDER CONTACT NOTE (OTHER) - RECOMMENDATIONS
Continue to monitor?
as per np no new orders patient trop can be high due to esrd/bnp
asked np about the need to be back on insulin pump ,as per np endocrinologist will see the patient in am
follow protocol
Continue to monitor? Come assess pt?

## 2017-01-14 NOTE — PROVIDER CONTACT NOTE (OTHER) - REASON
Nodule noted at CTA access site.
low blood sugar
patient trop is 1.93
questioned about the need for bedtime sliding scale coverage
Pt tachycardiac on cardiac monitor 120's.

## 2017-01-14 NOTE — DISCHARGE NOTE ADULT - PATIENT PORTAL LINK FT
“You can access the [employer] Patient Portal, offered by [facility], by registering with the following website: [email or web address]”

## 2017-01-14 NOTE — DISCHARGE NOTE ADULT - SECONDARY DIAGNOSIS.
ACS (acute coronary syndrome) HTN (hypertension) S/P cardiac catheterization ESRD (end stage renal disease)

## 2017-01-14 NOTE — DISCHARGE NOTE ADULT - CARE PLAN
Principal Discharge DX:	Type 1 diabetes mellitus with ketoacidosis without coma  Goal:	resolved  Instructions for follow-up, activity and diet:	f/u with your endocrine MD in one week  Secondary Diagnosis:	ACS (acute coronary syndrome)  Instructions for follow-up, activity and diet:	HOME CARE INSTRUCTIONS  For the next few days, avoid physical activities that bring on chest pain. Continue physical activities as directed.  Do not smoke.  Avoid drinking alcohol.   Only take over-the-counter or prescription medicine for pain, discomfort, or fever as directed by your caregiver.  Follow your caregiver's suggestions for further testing if your chest pain does not go away.  Keep any follow-up appointments you made. If you do not go to an appointment, you could develop lasting (chronic) problems with pain. If there is any problem keeping an appointment, you must call to reschedule.   SEEK MEDICAL CARE IF:  You think you are having problems from the medicine you are taking. Read your medicine instructions carefully.  Your chest pain does not go away, even after treatment.  You develop a rash with blisters on your chest.  SEEK IMMEDIATE MEDICAL CARE IF:  You have increased chest pain or pain that spreads to your arm, neck, jaw, back, or abdomen.   You develop shortness of breath, an increasing cough, or you are coughing up blood.  You have severe back or abdominal pain, feel nauseous, or vomit.  You develop severe weakness, fainting, or chills.  You have a fever.  THIS IS AN EMERGENCY. Do not wait to see if the pain will go away. Get medical help at once. Call your local emergency services ([phone]). Do not drive yourself to the hospital.  Secondary Diagnosis:	HTN (hypertension)  Instructions for follow-up, activity and diet:	Low salt diet  Activity as tolerated.  Take all medication as prescribed.  Follow up with your medical doctor for routine blood pressure monitoring at your next visit.  Notify your doctor if you have any of the following symptoms:   Dizziness, Lightheadedness, Blurry vision, Headache, Chest pain, Shortness of breath  Secondary Diagnosis:	S/P cardiac catheterization  Instructions for follow-up, activity and diet:	F/U with Dr. Vela in one week to set up for possible stent Principal Discharge DX:	Type 1 diabetes mellitus with ketoacidosis without coma  Goal:	resolved  Instructions for follow-up, activity and diet:	f/u with your endocrine MD in one week  Secondary Diagnosis:	ACS (acute coronary syndrome)  Instructions for follow-up, activity and diet:	HOME CARE INSTRUCTIONS  For the next few days, avoid physical activities that bring on chest pain. Continue physical activities as directed.  Do not smoke.  Avoid drinking alcohol.   Only take over-the-counter or prescription medicine for pain, discomfort, or fever as directed by your caregiver.  Follow your caregiver's suggestions for further testing if your chest pain does not go away.  Keep any follow-up appointments you made. If you do not go to an appointment, you could develop lasting (chronic) problems with pain. If there is any problem keeping an appointment, you must call to reschedule.   SEEK MEDICAL CARE IF:  You think you are having problems from the medicine you are taking. Read your medicine instructions carefully.  Your chest pain does not go away, even after treatment.  You develop a rash with blisters on your chest.  SEEK IMMEDIATE MEDICAL CARE IF:  You have increased chest pain or pain that spreads to your arm, neck, jaw, back, or abdomen.   You develop shortness of breath, an increasing cough, or you are coughing up blood.  You have severe back or abdominal pain, feel nauseous, or vomit.  You develop severe weakness, fainting, or chills.  You have a fever.  THIS IS AN EMERGENCY. Do not wait to see if the pain will go away. Get medical help at once. Call your local emergency services ([phone]). Do not drive yourself to the hospital.  Secondary Diagnosis:	HTN (hypertension)  Instructions for follow-up, activity and diet:	Low salt diet  Activity as tolerated.  Take all medication as prescribed.  Follow up with your medical doctor for routine blood pressure monitoring at your next visit.  Notify your doctor if you have any of the following symptoms:   Dizziness, Lightheadedness, Blurry vision, Headache, Chest pain, Shortness of breath  Secondary Diagnosis:	S/P cardiac catheterization  Instructions for follow-up, activity and diet:	F/U with Dr. Vela in one week to set up for possible stent  Secondary Diagnosis:	ESRD (end stage renal disease)  Instructions for follow-up, activity and diet:	Hemodialysis as per renal Principal Discharge DX:	Type 1 diabetes mellitus with ketoacidosis without coma  Goal:	resolved  Instructions for follow-up, activity and diet:	f/u with your endocrine MD in one week  Secondary Diagnosis:	ACS (acute coronary syndrome)  Instructions for follow-up, activity and diet:	HOME CARE INSTRUCTIONS  For the next few days, avoid physical activities that bring on chest pain. Continue physical activities as directed.  Do not smoke.  Avoid drinking alcohol.   Only take over-the-counter or prescription medicine for pain, discomfort, or fever as directed by your caregiver.  Follow your caregiver's suggestions for further testing if your chest pain does not go away.  Keep any follow-up appointments you made. If you do not go to an appointment, you could develop lasting (chronic) problems with pain. If there is any problem keeping an appointment, you must call to reschedule.   SEEK MEDICAL CARE IF:  You think you are having problems from the medicine you are taking. Read your medicine instructions carefully.  Your chest pain does not go away, even after treatment.  You develop a rash with blisters on your chest.  SEEK IMMEDIATE MEDICAL CARE IF:  You have increased chest pain or pain that spreads to your arm, neck, jaw, back, or abdomen.   You develop shortness of breath, an increasing cough, or you are coughing up blood.  You have severe back or abdominal pain, feel nauseous, or vomit.  You develop severe weakness, fainting, or chills.  You have a fever.  THIS IS AN EMERGENCY. Do not wait to see if the pain will go away. Get medical help at once. Call your local emergency services ([phone]). Do not drive yourself to the hospital.  Secondary Diagnosis:	HTN (hypertension)  Instructions for follow-up, activity and diet:	Low salt diet  Activity as tolerated.  Take all medication as prescribed.  Follow up with your medical doctor for routine blood pressure monitoring at your next visit.  Notify your doctor if you have any of the following symptoms:   Dizziness, Lightheadedness, Blurry vision, Headache, Chest pain, Shortness of breath  Secondary Diagnosis:	S/P cardiac catheterization  Instructions for follow-up, activity and diet:	F/U with Dr. Vela in one week to set up for possible stent  Secondary Diagnosis:	ESRD (end stage renal disease)  Instructions for follow-up, activity and diet:	Hemodialysis as per renal Principal Discharge DX:	Type 1 diabetes mellitus with ketoacidosis without coma  Goal:	resolved  Instructions for follow-up, activity and diet:	f/u with your endocrine MD in one week  Secondary Diagnosis:	ACS (acute coronary syndrome)  Instructions for follow-up, activity and diet:	HOME CARE INSTRUCTIONS  For the next few days, avoid physical activities that bring on chest pain. Continue physical activities as directed.  Do not smoke.  Avoid drinking alcohol.   Only take over-the-counter or prescription medicine for pain, discomfort, or fever as directed by your caregiver.  Follow your caregiver's suggestions for further testing if your chest pain does not go away.  Keep any follow-up appointments you made. If you do not go to an appointment, you could develop lasting (chronic) problems with pain. If there is any problem keeping an appointment, you must call to reschedule.   SEEK MEDICAL CARE IF:  You think you are having problems from the medicine you are taking. Read your medicine instructions carefully.  Your chest pain does not go away, even after treatment.  You develop a rash with blisters on your chest.  SEEK IMMEDIATE MEDICAL CARE IF:  You have increased chest pain or pain that spreads to your arm, neck, jaw, back, or abdomen.   You develop shortness of breath, an increasing cough, or you are coughing up blood.  You have severe back or abdominal pain, feel nauseous, or vomit.  You develop severe weakness, fainting, or chills.  You have a fever.  THIS IS AN EMERGENCY. Do not wait to see if the pain will go away. Get medical help at once. Call your local emergency services ([phone]). Do not drive yourself to the hospital.  Secondary Diagnosis:	HTN (hypertension)  Instructions for follow-up, activity and diet:	Low salt diet  Activity as tolerated.  Take all medication as prescribed.  Follow up with your medical doctor for routine blood pressure monitoring at your next visit.  Notify your doctor if you have any of the following symptoms:   Dizziness, Lightheadedness, Blurry vision, Headache, Chest pain, Shortness of breath  Secondary Diagnosis:	S/P cardiac catheterization  Instructions for follow-up, activity and diet:	F/U with Dr. Vela in one week to set up for possible stent  Secondary Diagnosis:	ESRD (end stage renal disease)  Instructions for follow-up, activity and diet:	Hemodialysis as per renal Principal Discharge DX:	Type 1 diabetes mellitus with ketoacidosis without coma  Goal:	resolved  Instructions for follow-up, activity and diet:	f/u with your endocrine MD in one week  Secondary Diagnosis:	ACS (acute coronary syndrome)  Instructions for follow-up, activity and diet:	HOME CARE INSTRUCTIONS  For the next few days, avoid physical activities that bring on chest pain. Continue physical activities as directed.  Do not smoke.  Avoid drinking alcohol.   Only take over-the-counter or prescription medicine for pain, discomfort, or fever as directed by your caregiver.  Follow your caregiver's suggestions for further testing if your chest pain does not go away.  Keep any follow-up appointments you made. If you do not go to an appointment, you could develop lasting (chronic) problems with pain. If there is any problem keeping an appointment, you must call to reschedule.   SEEK MEDICAL CARE IF:  You think you are having problems from the medicine you are taking. Read your medicine instructions carefully.  Your chest pain does not go away, even after treatment.  You develop a rash with blisters on your chest.  SEEK IMMEDIATE MEDICAL CARE IF:  You have increased chest pain or pain that spreads to your arm, neck, jaw, back, or abdomen.   You develop shortness of breath, an increasing cough, or you are coughing up blood.  You have severe back or abdominal pain, feel nauseous, or vomit.  You develop severe weakness, fainting, or chills.  You have a fever.  THIS IS AN EMERGENCY. Do not wait to see if the pain will go away. Get medical help at once. Call your local emergency services ([phone]). Do not drive yourself to the hospital.  Secondary Diagnosis:	HTN (hypertension)  Instructions for follow-up, activity and diet:	Low salt diet  Activity as tolerated.  Take all medication as prescribed.  Follow up with your medical doctor for routine blood pressure monitoring at your next visit.  Notify your doctor if you have any of the following symptoms:   Dizziness, Lightheadedness, Blurry vision, Headache, Chest pain, Shortness of breath  Secondary Diagnosis:	S/P cardiac catheterization  Instructions for follow-up, activity and diet:	F/U with Dr. Vela in one week to set up for possible stent  Secondary Diagnosis:	ESRD (end stage renal disease)  Instructions for follow-up, activity and diet:	Hemodialysis as per renal Principal Discharge DX:	Type 1 diabetes mellitus with ketoacidosis without coma  Goal:	resolved  Instructions for follow-up, activity and diet:	f/u with your endocrine MD in one week  Secondary Diagnosis:	ACS (acute coronary syndrome)  Instructions for follow-up, activity and diet:	HOME CARE INSTRUCTIONS  For the next few days, avoid physical activities that bring on chest pain. Continue physical activities as directed.  Do not smoke.  Avoid drinking alcohol.   Only take over-the-counter or prescription medicine for pain, discomfort, or fever as directed by your caregiver.  Follow your caregiver's suggestions for further testing if your chest pain does not go away.  Keep any follow-up appointments you made. If you do not go to an appointment, you could develop lasting (chronic) problems with pain. If there is any problem keeping an appointment, you must call to reschedule.   SEEK MEDICAL CARE IF:  You think you are having problems from the medicine you are taking. Read your medicine instructions carefully.  Your chest pain does not go away, even after treatment.  You develop a rash with blisters on your chest.  SEEK IMMEDIATE MEDICAL CARE IF:  You have increased chest pain or pain that spreads to your arm, neck, jaw, back, or abdomen.   You develop shortness of breath, an increasing cough, or you are coughing up blood.  You have severe back or abdominal pain, feel nauseous, or vomit.  You develop severe weakness, fainting, or chills.  You have a fever.  THIS IS AN EMERGENCY. Do not wait to see if the pain will go away. Get medical help at once. Call your local emergency services ([phone]). Do not drive yourself to the hospital.  Secondary Diagnosis:	HTN (hypertension)  Instructions for follow-up, activity and diet:	Low salt diet  Activity as tolerated.  Take all medication as prescribed.  Follow up with your medical doctor for routine blood pressure monitoring at your next visit.  Notify your doctor if you have any of the following symptoms:   Dizziness, Lightheadedness, Blurry vision, Headache, Chest pain, Shortness of breath  Secondary Diagnosis:	S/P cardiac catheterization  Instructions for follow-up, activity and diet:	F/U with Dr. Vela in one week to set up for possible stent  Secondary Diagnosis:	ESRD (end stage renal disease)  Instructions for follow-up, activity and diet:	Hemodialysis as per renal

## 2017-01-14 NOTE — PROVIDER CONTACT NOTE (OTHER) - ACTION/TREATMENT ORDERED:
Will come to assess pt now.
Continue to monitor.
no bedtime sliding scale for now patient fs was 235, no coverage given.patient stable ,
will give report to dayteam ,monitor q6 h
protocol followed. no new orders at this time FS now 137

## 2017-01-14 NOTE — PROVIDER CONTACT NOTE (OTHER) - BACKGROUND
admitted with dka, esrd, pro bnp [id number]
patient was transferred from [facility] to [address] [place] [address] s/p DKA ,esrd on hemodialysis,patient also type 1 dm on insulin pump at home
Pt admitted with DKA and r/o ACS
Pt admitted for trx of DKA, hx CADx4 stents, CVA, HTN. Pt went for cardiac cath yesterday afternoon. Accessed to the right groin. Pressure dressing in place.
Pt admitted for DKA, and hx of TIA, ACS, CAD, ESRD, CVA, HTN. Pt s/p cardiac cath yesterday. Pt ambulates with a cane x1 assist.

## 2017-01-14 NOTE — DISCHARGE NOTE ADULT - PLAN OF CARE
resolved f/u with your endocrine MD in one week HOME CARE INSTRUCTIONS  For the next few days, avoid physical activities that bring on chest pain. Continue physical activities as directed.  Do not smoke.  Avoid drinking alcohol.   Only take over-the-counter or prescription medicine for pain, discomfort, or fever as directed by your caregiver.  Follow your caregiver's suggestions for further testing if your chest pain does not go away.  Keep any follow-up appointments you made. If you do not go to an appointment, you could develop lasting (chronic) problems with pain. If there is any problem keeping an appointment, you must call to reschedule.   SEEK MEDICAL CARE IF:  You think you are having problems from the medicine you are taking. Read your medicine instructions carefully.  Your chest pain does not go away, even after treatment.  You develop a rash with blisters on your chest.  SEEK IMMEDIATE MEDICAL CARE IF:  You have increased chest pain or pain that spreads to your arm, neck, jaw, back, or abdomen.   You develop shortness of breath, an increasing cough, or you are coughing up blood.  You have severe back or abdominal pain, feel nauseous, or vomit.  You develop severe weakness, fainting, or chills.  You have a fever.  THIS IS AN EMERGENCY. Do not wait to see if the pain will go away. Get medical help at once. Call your local emergency services ([phone]). Do not drive yourself to the hospital. Low salt diet  Activity as tolerated.  Take all medication as prescribed.  Follow up with your medical doctor for routine blood pressure monitoring at your next visit.  Notify your doctor if you have any of the following symptoms:   Dizziness, Lightheadedness, Blurry vision, Headache, Chest pain, Shortness of breath F/U with Dr. Vela in one week to set up for possible stent Hemodialysis as per renal

## 2017-01-14 NOTE — PROVIDER CONTACT NOTE (OTHER) - ASSESSMENT
VSS, pt stable, pt denies any distress or pain. Pt ambulated to pantry room when ectopy occurred. Pt now resting comfortably in bed.
VSS, pt stable. No active bleeding noted, no bruising noted. Pt denies any pain or distress. Pt resting comfortably in bed.
patient free from chest pain and sob
patient off insulin pump from [facility] ,as patient was on insulin gtt
Pt a&ox3, vss. No c/o of chest pain sob or palpitations.

## 2017-01-14 NOTE — DISCHARGE NOTE ADULT - CARE PROVIDER_API CALL
Mauro Vela (MD), Cardiovascular Disease; Internal Medicine; Interventional Cardiology  [address] [facility] [address]  [place] [address]  Phone: [phone]  Fax: [phone]    Pina Ley (SUSAN), EndocrinologyMetabDiabetes  [address] [address] [place] [id number]  Phone: [phone]  Fax: [phone]    Rell Almonte (), Internal Medicine  [address] [facility] [address]  [place] [address]  Phone: [phone]  Fax: [phone]

## 2017-01-14 NOTE — PROVIDER CONTACT NOTE (OTHER) - SITUATION
FS 39, Pt on insulin pump
Pt reassessed for + pedal pulses, and no swelling or bleeding at CTA site. Pt free from pain and distress. Small hard nodule assessed upon reassessment at site.
Pt tachycardiac on cardiac monitor. HR up to 120. Pt just ambulated in hallway. Pt heart rate lowered to 98 after lying down.

## 2017-01-14 NOTE — DISCHARGE NOTE ADULT - MEDICATION SUMMARY - MEDICATIONS TO CHANGE
I will SWITCH the dose or number of times a day I take the medications listed below when I get home from the hospital:  None I will SWITCH the dose or number of times a day I take the medications listed below when I get home from the hospital:    insulin aspart 100 units/mL subcutaneous solution  -- /Novolog  Pump subcutaneous:   BASAL RATE:     start time 1200: Rate 0.55 units/hour      start time 0330: Rate 0.575units/hour      start time 0600: Rate 0.525 units/hour  INSULIN TO CARB RATIO  start time 00:00 Ratio 1:10units/grams  start time 16:00 Ratio 1:12 units/grams  INSULIN SENSITIVITY FACTOR  Start time 00:00 ISF: 45  Start time 03:00 ISF: 40  BLOOD GLUCOSE TARGET  Start time 00:00 -130mg/dl  Start time 08:00 -120mg/dl         -- FOLLOW UP WITH YOUR ENDOCRINOLOGIST

## 2017-01-14 NOTE — DISCHARGE NOTE ADULT - MEDICATION SUMMARY - MEDICATIONS TO TAKE
I will START or STAY ON the medications listed below when I get home from the hospital:    aspirin 81 mg oral tablet, chewable  -- 1 tab(s) by mouth once a day  -- Indication: For Cad    acetaminophen-oxyCODONE 325 mg-5 mg oral tablet  -- 1 tab(s) by mouth every 8 hours, As Needed, Moderate Pain MDD:3 tabs ( three day supply)   -- Indication: For Pain    DULoxetine 30 mg oral delayed release capsule  -- 2 cap(s) by mouth once a day  -- Indication: For Depression    insulin aspart 100 units/mL subcutaneous solution  -- /Novolog  Pump subcutaneous:   BASAL RATE:     start time 1200: Rate 0.55 units/hour      start time 0330: Rate 0.575units/hour      start time 0600: Rate 0.525 units/hour  INSULIN TO CARB RATIO  start time 00:00 Ratio 1:10units/grams  start time 16:00 Ratio 1:12 units/grams  INSULIN SENSITIVITY FACTOR  Start time 00:00 ISF: 45  Start time 03:00 ISF: 40  BLOOD GLUCOSE TARGET  Start time 00:00 -130mg/dl  Start time 08:00 -120mg/dl         -- FOLLOW UP WITH YOUR ENDOCRINOLOGIST   -- Indication: For Dm    atorvastatin 20 mg oral tablet  -- 1 tab(s) by mouth once a day (at bedtime)  -- Indication: For Hld    clopidogrel 75 mg oral tablet  -- 1 tab(s) by mouth once a day  -- Indication: For Cad    metoprolol succinate 50 mg oral tablet, extended release  -- 1 tab(s) by mouth once a day  -- Indication: For Htn    Sensipar 30 mg oral tablet  -- 1 tab(s) by mouth once a day  -- Indication: For ESRD (end stage renal disease)    Renvela 800 mg oral tablet  -- 1 tab(s) by mouth 3 times a day  -- Indication: For ESRD (end stage renal disease)    Multiple Vitamins oral tablet  -- 1 tab(s) by mouth once a day  -- Indication: For supplement I will START or STAY ON the medications listed below when I get home from the hospital:    aspirin 81 mg oral tablet, chewable  -- 1 tab(s) by mouth once a day  -- Indication: For Cad    acetaminophen-oxyCODONE 325 mg-5 mg oral tablet  -- 1 tab(s) by mouth every 8 hours, As Needed, Moderate Pain MDD:3 tabs ( three day supply)   -- Indication: For Pain    DULoxetine 30 mg oral delayed release capsule  -- 2 cap(s) by mouth once a day  -- Indication: For Depression    insulin aspart 100 units/mL subcutaneous solution  -- /Novolog  Pump subcutaneous:   BASAL RATE:     start time 2400: Rate 0.4 units/hour      start time 0300: Rate 0.425units/hour      start time 0600: Rate 0.4 units/hour  INSULIN TO CARB RATIO  start time 00:00am Ratio 1:10units/grams  start time 12:00 pm Ratio 1:12 units/grams  INSULIN SENSITIVITY FACTOR  Start time 00:00 ISF: 45  Start time 03:00 ISF: 40  BLOOD GLUCOSE TARGET  Start time 00:00 -130mg/dl  Start time 08:00 -120mg/dl         -- FOLLOW UP WITH YOUR ENDOCRINOLOGIST   -- Indication: For Dm    atorvastatin 20 mg oral tablet  -- 1 tab(s) by mouth once a day (at bedtime)  -- Indication: For Hld    clopidogrel 75 mg oral tablet  -- 1 tab(s) by mouth once a day  -- Indication: For Cad    metoprolol succinate 50 mg oral tablet, extended release  -- 1 tab(s) by mouth once a day  -- Indication: For Htn    Sensipar 30 mg oral tablet  -- 1 tab(s) by mouth once a day  -- Indication: For ESRD (end stage renal disease)    Renvela 800 mg oral tablet  -- 1 tab(s) by mouth 3 times a day  -- Indication: For ESRD (end stage renal disease)    Multiple Vitamins oral tablet  -- 1 tab(s) by mouth once a day  -- Indication: For supplement

## 2017-01-14 NOTE — DISCHARGE NOTE ADULT - HOSPITAL COURSE
pmd dx: DKA and r/o ACS / [facility]  62 yo F w/ PMH of type 1 DM, CADx4 stents, MI 1/2015, ESRD (on HD TTS), CVA x3 ( weakness on left upper and lower extremities-uses cane to ambulate, short term memory loss), PVD with occluded fem-pop bypass 5/2015, ASD/VSD repair, p/w sudden onset L sided chest pain and abn pain, found to be in DKA in ED and w/ possible NSTEMI1/11: admitted to [facility] for DKA management and r/o ACS. Got HD in AM per Renal. Gap closed ~11AM, bicarb 30, cleared by Endo to dose Lantus and premeal (12U Lantus, 3U premeal). Kyra still rising. Spoke with Dr. Vela, no plans for urgent cath today but would check TTE and possibly cath tmw. Given 12U Lantus at 11:30AM. Diet ordered. Insulin gtt off at 1:30PM. TTE complete, awaiting read. Bedboarded to tele.  1/12/17: cardiac cath via RFA 6French suture. POBA to pRCA (80%), mRCA (95%). cutting and balloon  1/13: Insulin pump was not started since admission. Seen by Endocrinologist, Dr. Lorenzo. Started on Insulin Pump this  afternoon.

## 2017-01-15 VITALS
RESPIRATION RATE: 18 BRPM | SYSTOLIC BLOOD PRESSURE: 132 MMHG | HEART RATE: 70 BPM | TEMPERATURE: 97 F | OXYGEN SATURATION: 98 % | DIASTOLIC BLOOD PRESSURE: 80 MMHG

## 2017-01-15 LAB
ANION GAP SERPL CALC-SCNC: 17 MMOL/L — SIGNIFICANT CHANGE UP (ref 5–17)
BUN SERPL-MCNC: 18 MG/DL — SIGNIFICANT CHANGE UP (ref 7–23)
CALCIUM SERPL-MCNC: 8.1 MG/DL — LOW (ref 8.4–10.5)
CHLORIDE SERPL-SCNC: 94 MMOL/L — LOW (ref 96–108)
CO2 SERPL-SCNC: 24 MMOL/L — SIGNIFICANT CHANGE UP (ref 22–31)
CREAT SERPL-MCNC: 3.49 MG/DL — HIGH (ref 0.5–1.3)
GLUCOSE SERPL-MCNC: 214 MG/DL — HIGH (ref 70–99)
HCT VFR BLD CALC: 26.4 % — LOW (ref 34.5–45)
HGB BLD-MCNC: 8.2 G/DL — LOW (ref 11.5–15.5)
MCHC RBC-ENTMCNC: 31.1 GM/DL — LOW (ref 32–36)
MCHC RBC-ENTMCNC: 32 PG — SIGNIFICANT CHANGE UP (ref 27–34)
MCV RBC AUTO: 103.1 FL — HIGH (ref 80–100)
PLATELET # BLD AUTO: 238 K/UL — SIGNIFICANT CHANGE UP (ref 150–400)
POTASSIUM SERPL-MCNC: 4.1 MMOL/L — SIGNIFICANT CHANGE UP (ref 3.5–5.3)
POTASSIUM SERPL-SCNC: 4.1 MMOL/L — SIGNIFICANT CHANGE UP (ref 3.5–5.3)
RBC # BLD: 2.56 M/UL — LOW (ref 3.8–5.2)
RBC # FLD: 12.8 % — SIGNIFICANT CHANGE UP (ref 10.3–14.5)
SODIUM SERPL-SCNC: 135 MMOL/L — SIGNIFICANT CHANGE UP (ref 135–145)
WBC # BLD: 3.27 K/UL — LOW (ref 3.8–10.5)
WBC # FLD AUTO: 3.27 K/UL — LOW (ref 3.8–10.5)

## 2017-01-15 PROCEDURE — 71045 X-RAY EXAM CHEST 1 VIEW: CPT

## 2017-01-15 PROCEDURE — 83880 ASSAY OF NATRIURETIC PEPTIDE: CPT

## 2017-01-15 PROCEDURE — 82435 ASSAY OF BLOOD CHLORIDE: CPT

## 2017-01-15 PROCEDURE — 86709 HEPATITIS A IGM ANTIBODY: CPT

## 2017-01-15 PROCEDURE — 85730 THROMBOPLASTIN TIME PARTIAL: CPT

## 2017-01-15 PROCEDURE — 93458 L HRT ARTERY/VENTRICLE ANGIO: CPT

## 2017-01-15 PROCEDURE — 86704 HEP B CORE ANTIBODY TOTAL: CPT

## 2017-01-15 PROCEDURE — 84484 ASSAY OF TROPONIN QUANT: CPT

## 2017-01-15 PROCEDURE — 86706 HEP B SURFACE ANTIBODY: CPT

## 2017-01-15 PROCEDURE — 84295 ASSAY OF SERUM SODIUM: CPT

## 2017-01-15 PROCEDURE — C1769: CPT

## 2017-01-15 PROCEDURE — C1725: CPT

## 2017-01-15 PROCEDURE — 84132 ASSAY OF SERUM POTASSIUM: CPT

## 2017-01-15 PROCEDURE — 93005 ELECTROCARDIOGRAM TRACING: CPT

## 2017-01-15 PROCEDURE — 87633 RESP VIRUS 12-25 TARGETS: CPT

## 2017-01-15 PROCEDURE — 96374 THER/PROPH/DIAG INJ IV PUSH: CPT | Mod: XU

## 2017-01-15 PROCEDURE — 87340 HEPATITIS B SURFACE AG IA: CPT

## 2017-01-15 PROCEDURE — C1894: CPT

## 2017-01-15 PROCEDURE — 71275 CT ANGIOGRAPHY CHEST: CPT

## 2017-01-15 PROCEDURE — 85027 COMPLETE CBC AUTOMATED: CPT

## 2017-01-15 PROCEDURE — 96375 TX/PRO/DX INJ NEW DRUG ADDON: CPT | Mod: XU

## 2017-01-15 PROCEDURE — 87486 CHLMYD PNEUM DNA AMP PROBE: CPT

## 2017-01-15 PROCEDURE — 94640 AIRWAY INHALATION TREATMENT: CPT

## 2017-01-15 PROCEDURE — 92941 PRQ TRLML REVSC TOT OCCL AMI: CPT | Mod: RC

## 2017-01-15 PROCEDURE — 85610 PROTHROMBIN TIME: CPT

## 2017-01-15 PROCEDURE — 82009 KETONE BODYS QUAL: CPT

## 2017-01-15 PROCEDURE — 82550 ASSAY OF CK (CPK): CPT

## 2017-01-15 PROCEDURE — 86705 HEP B CORE ANTIBODY IGM: CPT

## 2017-01-15 PROCEDURE — 87798 DETECT AGENT NOS DNA AMP: CPT

## 2017-01-15 PROCEDURE — 99261: CPT

## 2017-01-15 PROCEDURE — 82330 ASSAY OF CALCIUM: CPT

## 2017-01-15 PROCEDURE — C1887: CPT

## 2017-01-15 PROCEDURE — 82553 CREATINE MB FRACTION: CPT

## 2017-01-15 PROCEDURE — 83735 ASSAY OF MAGNESIUM: CPT

## 2017-01-15 PROCEDURE — 74174 CTA ABD&PLVS W/CONTRAST: CPT

## 2017-01-15 PROCEDURE — 85014 HEMATOCRIT: CPT

## 2017-01-15 PROCEDURE — 93306 TTE W/DOPPLER COMPLETE: CPT

## 2017-01-15 PROCEDURE — 86803 HEPATITIS C AB TEST: CPT

## 2017-01-15 PROCEDURE — 82803 BLOOD GASES ANY COMBINATION: CPT

## 2017-01-15 PROCEDURE — 84100 ASSAY OF PHOSPHORUS: CPT

## 2017-01-15 PROCEDURE — 99285 EMERGENCY DEPT VISIT HI MDM: CPT | Mod: 25

## 2017-01-15 PROCEDURE — 80053 COMPREHEN METABOLIC PANEL: CPT

## 2017-01-15 PROCEDURE — 83605 ASSAY OF LACTIC ACID: CPT

## 2017-01-15 PROCEDURE — 82947 ASSAY GLUCOSE BLOOD QUANT: CPT

## 2017-01-15 PROCEDURE — 83036 HEMOGLOBIN GLYCOSYLATED A1C: CPT

## 2017-01-15 PROCEDURE — 80048 BASIC METABOLIC PNL TOTAL CA: CPT

## 2017-01-15 PROCEDURE — 96376 TX/PRO/DX INJ SAME DRUG ADON: CPT | Mod: XU

## 2017-01-15 PROCEDURE — 87581 M.PNEUMON DNA AMP PROBE: CPT

## 2017-01-15 RX ADMIN — HEPARIN SODIUM 5000 UNIT(S): 5000 INJECTION INTRAVENOUS; SUBCUTANEOUS at 06:07

## 2017-01-15 RX ADMIN — DULOXETINE HYDROCHLORIDE 60 MILLIGRAM(S): 30 CAPSULE, DELAYED RELEASE ORAL at 14:04

## 2017-01-15 RX ADMIN — HEPARIN SODIUM 5000 UNIT(S): 5000 INJECTION INTRAVENOUS; SUBCUTANEOUS at 14:05

## 2017-01-15 RX ADMIN — Medication 50 MILLIGRAM(S): at 06:07

## 2017-01-15 RX ADMIN — CINACALCET 60 MILLIGRAM(S): 30 TABLET, FILM COATED ORAL at 14:05

## 2017-01-15 RX ADMIN — CLOPIDOGREL BISULFATE 75 MILLIGRAM(S): 75 TABLET, FILM COATED ORAL at 14:04

## 2017-01-15 RX ADMIN — Medication 81 MILLIGRAM(S): at 14:04

## 2017-01-20 ENCOUNTER — APPOINTMENT (OUTPATIENT)
Dept: NEUROLOGY | Facility: CLINIC | Age: 62
End: 2017-01-20

## 2017-01-20 VITALS
HEIGHT: 64 IN | DIASTOLIC BLOOD PRESSURE: 74 MMHG | SYSTOLIC BLOOD PRESSURE: 135 MMHG | HEART RATE: 78 BPM | WEIGHT: 131 LBS | BODY MASS INDEX: 22.36 KG/M2

## 2017-01-20 DIAGNOSIS — M79.672 PAIN IN LEFT FOOT: ICD-10-CM

## 2017-02-01 ENCOUNTER — OUTPATIENT (OUTPATIENT)
Dept: OUTPATIENT SERVICES | Facility: HOSPITAL | Age: 62
LOS: 1 days | End: 2017-02-01
Payer: MEDICARE

## 2017-02-01 VITALS
HEART RATE: 68 BPM | TEMPERATURE: 99 F | SYSTOLIC BLOOD PRESSURE: 167 MMHG | RESPIRATION RATE: 20 BRPM | WEIGHT: 130.95 LBS | OXYGEN SATURATION: 96 % | HEIGHT: 64 IN | DIASTOLIC BLOOD PRESSURE: 73 MMHG

## 2017-02-01 DIAGNOSIS — I25.10 ATHEROSCLEROTIC HEART DISEASE OF NATIVE CORONARY ARTERY WITHOUT ANGINA PECTORIS: ICD-10-CM

## 2017-02-01 DIAGNOSIS — Z98.89 OTHER SPECIFIED POSTPROCEDURAL STATES: Chronic | ICD-10-CM

## 2017-02-01 LAB
ALBUMIN SERPL ELPH-MCNC: 4.2 G/DL — SIGNIFICANT CHANGE UP (ref 3.3–5)
ALP SERPL-CCNC: 225 U/L — HIGH (ref 40–120)
ALT FLD-CCNC: 17 U/L RC — SIGNIFICANT CHANGE UP (ref 10–45)
ANION GAP SERPL CALC-SCNC: 17 MMOL/L — SIGNIFICANT CHANGE UP (ref 5–17)
AST SERPL-CCNC: 22 U/L — SIGNIFICANT CHANGE UP (ref 10–40)
BILIRUB SERPL-MCNC: 0.3 MG/DL — SIGNIFICANT CHANGE UP (ref 0.2–1.2)
BUN SERPL-MCNC: 33 MG/DL — HIGH (ref 7–23)
CALCIUM SERPL-MCNC: 8.6 MG/DL — SIGNIFICANT CHANGE UP (ref 8.4–10.5)
CHLORIDE SERPL-SCNC: 94 MMOL/L — LOW (ref 96–108)
CO2 SERPL-SCNC: 28 MMOL/L — SIGNIFICANT CHANGE UP (ref 22–31)
CREAT SERPL-MCNC: 6.42 MG/DL — HIGH (ref 0.5–1.3)
GLUCOSE SERPL-MCNC: 239 MG/DL — HIGH (ref 70–99)
HCT VFR BLD CALC: 27 % — LOW (ref 34.5–45)
HGB BLD-MCNC: 9.2 G/DL — LOW (ref 11.5–15.5)
MCHC RBC-ENTMCNC: 34.2 GM/DL — SIGNIFICANT CHANGE UP (ref 32–36)
MCHC RBC-ENTMCNC: 34.6 PG — HIGH (ref 27–34)
MCV RBC AUTO: 101 FL — HIGH (ref 80–100)
PLATELET # BLD AUTO: 217 K/UL — SIGNIFICANT CHANGE UP (ref 150–400)
POTASSIUM SERPL-MCNC: 4.5 MMOL/L — SIGNIFICANT CHANGE UP (ref 3.5–5.3)
POTASSIUM SERPL-SCNC: 4.5 MMOL/L — SIGNIFICANT CHANGE UP (ref 3.5–5.3)
PROT SERPL-MCNC: 7.1 G/DL — SIGNIFICANT CHANGE UP (ref 6–8.3)
RBC # BLD: 2.67 M/UL — LOW (ref 3.8–5.2)
RBC # FLD: 12.5 % — SIGNIFICANT CHANGE UP (ref 10.3–14.5)
SODIUM SERPL-SCNC: 139 MMOL/L — SIGNIFICANT CHANGE UP (ref 135–145)
WBC # BLD: 4.4 K/UL — SIGNIFICANT CHANGE UP (ref 3.8–10.5)
WBC # FLD AUTO: 4.4 K/UL — SIGNIFICANT CHANGE UP (ref 3.8–10.5)

## 2017-02-01 PROCEDURE — 99215 OFFICE O/P EST HI 40 MIN: CPT

## 2017-02-01 PROCEDURE — 93970 EXTREMITY STUDY: CPT | Mod: 26

## 2017-02-01 PROCEDURE — 80053 COMPREHEN METABOLIC PANEL: CPT

## 2017-02-01 PROCEDURE — 93010 ELECTROCARDIOGRAM REPORT: CPT

## 2017-02-01 PROCEDURE — 85027 COMPLETE CBC AUTOMATED: CPT

## 2017-02-01 PROCEDURE — 93970 EXTREMITY STUDY: CPT

## 2017-02-01 PROCEDURE — 93005 ELECTROCARDIOGRAM TRACING: CPT

## 2017-02-01 RX ORDER — DEXTROSE 50 % IN WATER 50 %
25 SYRINGE (ML) INTRAVENOUS ONCE
Qty: 0 | Refills: 0 | Status: COMPLETED | OUTPATIENT
Start: 2017-02-01 | End: 2017-02-01

## 2017-02-01 RX ORDER — SODIUM CHLORIDE 9 MG/ML
1000 INJECTION, SOLUTION INTRAVENOUS
Qty: 0 | Refills: 0 | Status: DISCONTINUED | OUTPATIENT
Start: 2017-02-01 | End: 2017-02-16

## 2017-02-01 RX ORDER — DEXTROSE 50 % IN WATER 50 %
25 SYRINGE (ML) INTRAVENOUS ONCE
Qty: 0 | Refills: 0 | Status: DISCONTINUED | OUTPATIENT
Start: 2017-02-01 | End: 2017-02-16

## 2017-02-01 RX ORDER — DEXTROSE 10 % IN WATER 10 %
1000 INTRAVENOUS SOLUTION INTRAVENOUS
Qty: 0 | Refills: 0 | Status: DISCONTINUED | OUTPATIENT
Start: 2017-02-01 | End: 2017-02-16

## 2017-02-01 RX ORDER — SODIUM CHLORIDE 9 MG/ML
3 INJECTION INTRAMUSCULAR; INTRAVENOUS; SUBCUTANEOUS EVERY 8 HOURS
Qty: 0 | Refills: 0 | Status: DISCONTINUED | OUTPATIENT
Start: 2017-02-01 | End: 2017-02-16

## 2017-02-01 RX ORDER — DEXTROSE 50 % IN WATER 50 %
1 SYRINGE (ML) INTRAVENOUS ONCE
Qty: 0 | Refills: 0 | Status: DISCONTINUED | OUTPATIENT
Start: 2017-02-01 | End: 2017-02-16

## 2017-02-01 RX ORDER — GLUCAGON INJECTION, SOLUTION 0.5 MG/.1ML
1 INJECTION, SOLUTION SUBCUTANEOUS ONCE
Qty: 0 | Refills: 0 | Status: DISCONTINUED | OUTPATIENT
Start: 2017-02-01 | End: 2017-02-16

## 2017-02-01 RX ORDER — DEXTROSE 50 % IN WATER 50 %
12.5 SYRINGE (ML) INTRAVENOUS ONCE
Qty: 0 | Refills: 0 | Status: DISCONTINUED | OUTPATIENT
Start: 2017-02-01 | End: 2017-02-16

## 2017-02-01 RX ORDER — ERYTHROPOIETIN 10000 [IU]/ML
10000 INJECTION, SOLUTION INTRAVENOUS; SUBCUTANEOUS ONCE
Qty: 0 | Refills: 0 | Status: DISCONTINUED | OUTPATIENT
Start: 2017-02-01 | End: 2017-02-16

## 2017-02-01 RX ADMIN — Medication 20 MILLILITER(S): at 10:06

## 2017-02-01 RX ADMIN — Medication 25 MILLILITER(S): at 09:23

## 2017-02-01 RX ADMIN — Medication 25 MILLILITER(S): at 09:52

## 2017-02-01 NOTE — H&P CARDIOLOGY - HISTORY OF PRESENT ILLNESS
This is a 61 y old female  PMH CAD PVD Type 1 DM on insulin Pump Dialysis Davidjaneen Ortegabayron sat  multiple bypass surgery in past  angioplasty  keep occluding  scheduled for revision of bypass Surgery changed to 11/11 /15 from 11/4 patient need cardiology evaluation This is a 61 y old female  PMH CAD PVD Type 1 DM on insulin Pump Dialysis Leslee Tompkins sat  multiple bypass surgery in past  angioplasty  keep occluding  had MI 2 weeks ago had PTCA & PCI to Mid & prox RCA on 1/12/2017 & now returns for laser  brachytherapy today.denies chest pain SOB currently.    Nephrologist Dr Schmidt  on insulin pump

## 2017-03-01 ENCOUNTER — INPATIENT (INPATIENT)
Facility: HOSPITAL | Age: 62
LOS: 1 days | Discharge: HOME | DRG: 291 | End: 2017-03-03
Attending: INTERNAL MEDICINE | Admitting: INTERNAL MEDICINE
Payer: MEDICARE

## 2017-03-01 VITALS
RESPIRATION RATE: 24 BRPM | DIASTOLIC BLOOD PRESSURE: 105 MMHG | TEMPERATURE: 98 F | SYSTOLIC BLOOD PRESSURE: 124 MMHG | HEIGHT: 63 IN | HEART RATE: 67 BPM | OXYGEN SATURATION: 100 %

## 2017-03-01 DIAGNOSIS — N18.6 END STAGE RENAL DISEASE: ICD-10-CM

## 2017-03-01 DIAGNOSIS — I25.10 ATHEROSCLEROTIC HEART DISEASE OF NATIVE CORONARY ARTERY WITHOUT ANGINA PECTORIS: ICD-10-CM

## 2017-03-01 DIAGNOSIS — R06.02 SHORTNESS OF BREATH: ICD-10-CM

## 2017-03-01 DIAGNOSIS — R60.0 LOCALIZED EDEMA: ICD-10-CM

## 2017-03-01 DIAGNOSIS — E11.9 TYPE 2 DIABETES MELLITUS WITHOUT COMPLICATIONS: ICD-10-CM

## 2017-03-01 DIAGNOSIS — Z98.89 OTHER SPECIFIED POSTPROCEDURAL STATES: Chronic | ICD-10-CM

## 2017-03-01 LAB
ALBUMIN SERPL ELPH-MCNC: 4 G/DL — SIGNIFICANT CHANGE UP (ref 3.3–5)
ALP SERPL-CCNC: 226 U/L — HIGH (ref 40–120)
ALT FLD-CCNC: 19 U/L RC — SIGNIFICANT CHANGE UP (ref 10–45)
ANION GAP SERPL CALC-SCNC: 18 MMOL/L — HIGH (ref 5–17)
AST SERPL-CCNC: 36 U/L — SIGNIFICANT CHANGE UP (ref 10–40)
BASE EXCESS BLDV CALC-SCNC: 6.1 MMOL/L — HIGH (ref -2–2)
BASOPHILS # BLD AUTO: 0 K/UL — SIGNIFICANT CHANGE UP (ref 0–0.2)
BASOPHILS NFR BLD AUTO: 0.7 % — SIGNIFICANT CHANGE UP (ref 0–2)
BILIRUB SERPL-MCNC: 0.3 MG/DL — SIGNIFICANT CHANGE UP (ref 0.2–1.2)
BUN SERPL-MCNC: 36 MG/DL — HIGH (ref 7–23)
CA-I SERPL-SCNC: 1.13 MMOL/L — SIGNIFICANT CHANGE UP (ref 1.12–1.3)
CALCIUM SERPL-MCNC: 8.9 MG/DL — SIGNIFICANT CHANGE UP (ref 8.4–10.5)
CHLORIDE BLDV-SCNC: 94 MMOL/L — LOW (ref 96–108)
CHLORIDE SERPL-SCNC: 93 MMOL/L — LOW (ref 96–108)
CO2 BLDV-SCNC: 33 MMOL/L — HIGH (ref 22–30)
CO2 SERPL-SCNC: 26 MMOL/L — SIGNIFICANT CHANGE UP (ref 22–31)
CREAT SERPL-MCNC: 6.19 MG/DL — HIGH (ref 0.5–1.3)
EOSINOPHIL # BLD AUTO: 0.1 K/UL — SIGNIFICANT CHANGE UP (ref 0–0.5)
EOSINOPHIL NFR BLD AUTO: 1.9 % — SIGNIFICANT CHANGE UP (ref 0–6)
GAS PNL BLDV: 134 MMOL/L — LOW (ref 136–145)
GAS PNL BLDV: SIGNIFICANT CHANGE UP
GAS PNL BLDV: SIGNIFICANT CHANGE UP
GLUCOSE BLDV-MCNC: 125 MG/DL — HIGH (ref 70–99)
GLUCOSE SERPL-MCNC: 116 MG/DL — HIGH (ref 70–99)
HCO3 BLDV-SCNC: 31 MMOL/L — HIGH (ref 21–29)
HCT VFR BLD CALC: 32.2 % — LOW (ref 34.5–45)
HCT VFR BLDA CALC: 24 % — LOW (ref 39–50)
HGB BLD CALC-MCNC: 7.6 G/DL — LOW (ref 11.5–15.5)
HGB BLD-MCNC: 10.5 G/DL — LOW (ref 11.5–15.5)
INR BLD: 0.93 RATIO — SIGNIFICANT CHANGE UP (ref 0.88–1.16)
LACTATE BLDV-MCNC: 1.8 MMOL/L — SIGNIFICANT CHANGE UP (ref 0.7–2)
LYMPHOCYTES # BLD AUTO: 1.3 K/UL — SIGNIFICANT CHANGE UP (ref 1–3.3)
LYMPHOCYTES # BLD AUTO: 23.6 % — SIGNIFICANT CHANGE UP (ref 13–44)
MCHC RBC-ENTMCNC: 32 PG — SIGNIFICANT CHANGE UP (ref 27–34)
MCHC RBC-ENTMCNC: 32.5 GM/DL — SIGNIFICANT CHANGE UP (ref 32–36)
MCV RBC AUTO: 98.5 FL — SIGNIFICANT CHANGE UP (ref 80–100)
MONOCYTES # BLD AUTO: 0.7 K/UL — SIGNIFICANT CHANGE UP (ref 0–0.9)
MONOCYTES NFR BLD AUTO: 12.9 % — SIGNIFICANT CHANGE UP (ref 2–14)
NEUTROPHILS # BLD AUTO: 3.3 K/UL — SIGNIFICANT CHANGE UP (ref 1.8–7.4)
NEUTROPHILS NFR BLD AUTO: 60.8 % — SIGNIFICANT CHANGE UP (ref 43–77)
PCO2 BLDV: 53 MMHG — HIGH (ref 35–50)
PH BLDV: 7.39 — SIGNIFICANT CHANGE UP (ref 7.35–7.45)
PLATELET # BLD AUTO: 243 K/UL — SIGNIFICANT CHANGE UP (ref 150–400)
PO2 BLDV: 32 MMHG — SIGNIFICANT CHANGE UP (ref 25–45)
POTASSIUM BLDV-SCNC: 6 MMOL/L — HIGH (ref 3.5–5)
POTASSIUM SERPL-MCNC: 5.2 MMOL/L — SIGNIFICANT CHANGE UP (ref 3.5–5.3)
POTASSIUM SERPL-SCNC: 5.2 MMOL/L — SIGNIFICANT CHANGE UP (ref 3.5–5.3)
PROT SERPL-MCNC: 7.1 G/DL — SIGNIFICANT CHANGE UP (ref 6–8.3)
PROTHROM AB SERPL-ACNC: 10 SEC — SIGNIFICANT CHANGE UP (ref 10–13.1)
RBC # BLD: 3.27 M/UL — LOW (ref 3.8–5.2)
RBC # FLD: 12.7 % — SIGNIFICANT CHANGE UP (ref 10.3–14.5)
SAO2 % BLDV: 56 % — LOW (ref 67–88)
SODIUM SERPL-SCNC: 137 MMOL/L — SIGNIFICANT CHANGE UP (ref 135–145)
WBC # BLD: 5.5 K/UL — SIGNIFICANT CHANGE UP (ref 3.8–10.5)
WBC # FLD AUTO: 5.5 K/UL — SIGNIFICANT CHANGE UP (ref 3.8–10.5)

## 2017-03-01 PROCEDURE — 93010 ELECTROCARDIOGRAM REPORT: CPT

## 2017-03-01 PROCEDURE — 99223 1ST HOSP IP/OBS HIGH 75: CPT

## 2017-03-01 PROCEDURE — 99285 EMERGENCY DEPT VISIT HI MDM: CPT | Mod: 25

## 2017-03-01 PROCEDURE — 71020: CPT | Mod: 26

## 2017-03-01 RX ORDER — METOPROLOL TARTRATE 50 MG
50 TABLET ORAL DAILY
Qty: 0 | Refills: 0 | Status: DISCONTINUED | OUTPATIENT
Start: 2017-03-01 | End: 2017-03-03

## 2017-03-01 RX ORDER — FUROSEMIDE 40 MG
1 TABLET ORAL
Qty: 0 | Refills: 0 | COMMUNITY

## 2017-03-01 RX ORDER — HEPARIN SODIUM 5000 [USP'U]/ML
5000 INJECTION INTRAVENOUS; SUBCUTANEOUS EVERY 12 HOURS
Qty: 0 | Refills: 0 | Status: DISCONTINUED | OUTPATIENT
Start: 2017-03-01 | End: 2017-03-03

## 2017-03-01 RX ORDER — ASPIRIN/CALCIUM CARB/MAGNESIUM 324 MG
81 TABLET ORAL DAILY
Qty: 0 | Refills: 0 | Status: DISCONTINUED | OUTPATIENT
Start: 2017-03-01 | End: 2017-03-03

## 2017-03-01 RX ORDER — INSULIN ASPART 100 [IU]/ML
1 INJECTION, SOLUTION SUBCUTANEOUS
Qty: 0 | Refills: 0 | Status: DISCONTINUED | OUTPATIENT
Start: 2017-03-01 | End: 2017-03-02

## 2017-03-01 RX ORDER — DEXTROSE 50 % IN WATER 50 %
25 SYRINGE (ML) INTRAVENOUS ONCE
Qty: 0 | Refills: 0 | Status: DISCONTINUED | OUTPATIENT
Start: 2017-03-01 | End: 2017-03-03

## 2017-03-01 RX ORDER — DEXTROSE 50 % IN WATER 50 %
1 SYRINGE (ML) INTRAVENOUS ONCE
Qty: 0 | Refills: 0 | Status: DISCONTINUED | OUTPATIENT
Start: 2017-03-01 | End: 2017-03-03

## 2017-03-01 RX ORDER — FUROSEMIDE 40 MG
40 TABLET ORAL DAILY
Qty: 0 | Refills: 0 | Status: DISCONTINUED | OUTPATIENT
Start: 2017-03-01 | End: 2017-03-03

## 2017-03-01 RX ORDER — CINACALCET 30 MG/1
30 TABLET, FILM COATED ORAL AT BEDTIME
Qty: 0 | Refills: 0 | Status: DISCONTINUED | OUTPATIENT
Start: 2017-03-01 | End: 2017-03-03

## 2017-03-01 RX ORDER — DEXTROSE 50 % IN WATER 50 %
12.5 SYRINGE (ML) INTRAVENOUS ONCE
Qty: 0 | Refills: 0 | Status: DISCONTINUED | OUTPATIENT
Start: 2017-03-01 | End: 2017-03-03

## 2017-03-01 RX ORDER — CEPHALEXIN 500 MG
500 CAPSULE ORAL EVERY 12 HOURS
Qty: 0 | Refills: 0 | Status: DISCONTINUED | OUTPATIENT
Start: 2017-03-01 | End: 2017-03-03

## 2017-03-01 RX ORDER — GLUCAGON INJECTION, SOLUTION 0.5 MG/.1ML
1 INJECTION, SOLUTION SUBCUTANEOUS ONCE
Qty: 0 | Refills: 0 | Status: DISCONTINUED | OUTPATIENT
Start: 2017-03-01 | End: 2017-03-03

## 2017-03-01 RX ORDER — SODIUM CHLORIDE 9 MG/ML
1000 INJECTION, SOLUTION INTRAVENOUS
Qty: 0 | Refills: 0 | Status: DISCONTINUED | OUTPATIENT
Start: 2017-03-01 | End: 2017-03-03

## 2017-03-01 RX ORDER — LIDOCAINE HCL 20 MG/ML
20 VIAL (ML) INJECTION ONCE
Qty: 0 | Refills: 0 | Status: DISCONTINUED | OUTPATIENT
Start: 2017-03-01 | End: 2017-03-03

## 2017-03-01 RX ORDER — DULOXETINE HYDROCHLORIDE 30 MG/1
1 CAPSULE, DELAYED RELEASE ORAL
Qty: 0 | Refills: 0 | COMMUNITY

## 2017-03-01 RX ORDER — DULOXETINE HYDROCHLORIDE 30 MG/1
60 CAPSULE, DELAYED RELEASE ORAL DAILY
Qty: 0 | Refills: 0 | Status: DISCONTINUED | OUTPATIENT
Start: 2017-03-01 | End: 2017-03-03

## 2017-03-01 RX ORDER — CLOPIDOGREL BISULFATE 75 MG/1
75 TABLET, FILM COATED ORAL AT BEDTIME
Qty: 0 | Refills: 0 | Status: DISCONTINUED | OUTPATIENT
Start: 2017-03-01 | End: 2017-03-03

## 2017-03-01 RX ORDER — ATORVASTATIN CALCIUM 80 MG/1
20 TABLET, FILM COATED ORAL AT BEDTIME
Qty: 0 | Refills: 0 | Status: DISCONTINUED | OUTPATIENT
Start: 2017-03-01 | End: 2017-03-03

## 2017-03-01 RX ORDER — SEVELAMER CARBONATE 2400 MG/1
2400 POWDER, FOR SUSPENSION ORAL
Qty: 0 | Refills: 0 | Status: DISCONTINUED | OUTPATIENT
Start: 2017-03-01 | End: 2017-03-03

## 2017-03-01 RX ORDER — ACETAMINOPHEN 500 MG
650 TABLET ORAL EVERY 6 HOURS
Qty: 0 | Refills: 0 | Status: DISCONTINUED | OUTPATIENT
Start: 2017-03-01 | End: 2017-03-03

## 2017-03-01 RX ADMIN — Medication 40 MILLIGRAM(S): at 18:35

## 2017-03-01 RX ADMIN — Medication 81 MILLIGRAM(S): at 18:35

## 2017-03-01 RX ADMIN — CINACALCET 30 MILLIGRAM(S): 30 TABLET, FILM COATED ORAL at 22:15

## 2017-03-01 RX ADMIN — CLOPIDOGREL BISULFATE 75 MILLIGRAM(S): 75 TABLET, FILM COATED ORAL at 22:14

## 2017-03-01 RX ADMIN — Medication 50 MILLIGRAM(S): at 18:35

## 2017-03-01 RX ADMIN — ATORVASTATIN CALCIUM 20 MILLIGRAM(S): 80 TABLET, FILM COATED ORAL at 22:14

## 2017-03-01 RX ADMIN — HEPARIN SODIUM 5000 UNIT(S): 5000 INJECTION INTRAVENOUS; SUBCUTANEOUS at 18:35

## 2017-03-01 RX ADMIN — Medication 1 TABLET(S): at 18:35

## 2017-03-01 NOTE — ED PROVIDER NOTE - OBJECTIVE STATEMENT
60y/o F with PMH HTN, HLD, ESRD on HD, CAD, murmur, CVA, TIA, MI, gout, DM w/ hx DKA, c/o SOB 60y/o F with PMH HTN, HLD, ESRD on HD, CAD, 4 stents, murmur, CVA, TIA, MI, gout, DM w/ hx DKA, c/o SOB x 1 day. SOB started at 5am. SOB not affected by walking. SOB worse with laying down. doesn't use oxygen.  thinks pt looks swollen. pt was scheduled for extra HD session today at 2 pm. denies any CP, abd pain, back pain, fever, chills. 62y/o F with PMH HTN, HLD, ESRD on HD, CAD, 4 stents, murmur, CVA, TIA, MI, gout, DM w/ hx DKA, c/o SOB x 1 day. SOB started at 5am. SOB not affected by walking. SOB worse with laying down. doesn't use oxygen.  thinks pt looks swollen. pt was scheduled for extra HD session today at 2 pm. denies any CP, abd pain, back pain, fever, chills.  PMD Dr. Viera [phone]  Renal Dr. Daisy Burger [phone]

## 2017-03-01 NOTE — ED ADULT NURSE NOTE - OBJECTIVE STATEMENT
62y/o F with PMH HTN, HLD, ESRD on HD, CAD, 4 stents, murmur, CVA, TIA, MI, gout, DM w/ hx DKA, c/o SOB x 1 day. SOB started at 5am. SOB not affected by walking. SOB worse with laying down. doesn't use oxygen.  thinks pt looks swollen. pt was scheduled for extra HD session today at 2 pm. denies any CP, abd pain, back pain, fever, chills.

## 2017-03-01 NOTE — ED PROVIDER NOTE - MEDICAL DECISION MAKING DETAILS
MD Marie:  62 yo F, c/o SOB x 1day; onset 5am.  MHx significant for ESRD, significant CAD (multiple stents).  Quality of SOB is like that before she develops ACS, but it is sometimes difficult for her to tell the difference between that and ESRD-related fluid overload.  Assoc. Sx - chronic unchanged LLE edema.  VS - wnl. Physical Exam: adult F, chronically ill-appearing, slight tachypnea, 1+ LLE edema, no edema RLE.  Lungs clear.  Impression:  ESRD/fluid overload vs ACS.  Plan:  admit for serial enzymes, renal evaluation, and cardiology eval.  Case d/w Primary Cardiologist (Dane), Nephrologist (Brayan), and prior admitting attending (Dr. Viera).

## 2017-03-01 NOTE — H&P ADULT. - PROBLEM SELECTOR PLAN 3
- patient seen by endocrine  - continue with insulin pump. If disconnected, give lantus 10 units  - monitor FS glucose

## 2017-03-01 NOTE — H&P ADULT. - HISTORY OF PRESENT ILLNESS
61F with PMhx CAD, MI, s/p stents, HTN, HLD, ESRD on HD(4 times a week), PVD s/p bilateral stent graft, left subclavian vein stent, DM I on insulin pump, DMA, CVA, TIA, recent admission in 1/2017 s/p 61F with PMhx CAD, MI, s/p stents, HTN, HLD, ESRD on HD(4 times a week), ? dCHF, PVD s/p bilateral bypass, left subclavian vein stent, DM I on insulin pump, DMA, CVA, TIA, recent admission in 1/2017 for DKA, ACS s/p cardiac cath with PTCA/atherectomy presents today with complaint of SOB since 5 AM. + orthopnea. No fever, chills, cough, chest pressure, ab pain. Patient reports symptoms are similar to the last time when she had MI except she doesn't have chest pain this time. Patient was due to have intravascular brachytherapy due to recurrent in-stent restenosis soon but hasn't gotten around to it yet. Patient endorses chronic LLE edema and pain which improves with ambulation. 61F with PMhx CAD, MI, s/p stents, HTN, HLD, ESRD on HD(4 times a week), ? dCHF, PVD s/p bilateral bypass, left subclavian vein stent, DM I on insulin pump, DKA, CVA, TIA, recent admission in 1/2017 for DKA, ACS s/p cardiac cath with PTCA/atherectomy presents today with complaint of SOB since 5 AM. + orthopnea. No fever, chills, cough, chest pressure, ab pain. Patient reports symptoms are similar to the last time when she had MI except she doesn't have chest pain this time. Patient was due to have intravascular brachytherapy due to recurrent in-stent restenosis soon but hasn't gotten around to it yet. Patient endorses chronic LLE edema and pain which improves with ambulation.

## 2017-03-01 NOTE — H&P ADULT. - PROBLEM SELECTOR PLAN 1
- telemetry  - serial CE  - recent TTE in 1/2017 showed normal LVSF.  - cardiology follow up  - continue with DAPT, beta blocker, statin, home dose lasix  - volume management via HD per renal

## 2017-03-01 NOTE — H&P ADULT. - OTHER CARE PROVIDERS
Endo: dr. Pina Ley  Cards: dr. Tee Biswas  Pulm: dr. Braden Thompson  Renal: Dr. Almonte  Vascular: Dr. Lance Elizalde

## 2017-03-01 NOTE — H&P ADULT. - PMH
ACS (acute coronary syndrome)  1/2015 - cath revealed 100% ostial stenosis not amenable to PCI - medical management  CAD (coronary artery disease)  s/p stents  Cellulitis  2015 left foot  CVA (cerebral infarction)  with no residual, 8 yrs ago, prior to heart surgery - ST memory loss  Diabetes mellitus type 1  Insulin Dependent - Medtronic  Minimed Paradigm Insulin Pump - Novolog  DKA, type 1  1/2015  ESRD (end stage renal disease)  dialysis , tue, thursday, saturday  Gout  past  HTN (hypertension)    Hyperlipidemia    MI, old    Murmur, cardiac    Peripheral vascular disease  occluded left fem-pop bypass 5/2015  Subclavian vein stenosis, left  s/p stent  TIA (transient ischemic attack)  x 2 - 8-9 years ago prior to ASD/VSD repair

## 2017-03-01 NOTE — H&P ADULT. - ASSESSMENT
61F with PMhx CAD, MI, s/p stents, HTN, HLD, ESRD on HD(4 times a week), ? dCHF, PVD s/p bilateral bypass, left subclavian vein stent, DM I on insulin pump, DMA, CVA, TIA, recent admission in 1/2017 for DKA, ACS s/p cardiac cath with PTCA/atherectomy presents today with complaint of SOB and orthopnea since 5 AM suspected possibly due to ADHF vs ACS. Patient currently denies SOB.

## 2017-03-01 NOTE — ED ADULT NURSE REASSESSMENT NOTE - NS ED NURSE REASSESS COMMENT FT1
fingerstick 308 pt on insulin pump. Eating macaroni and cheese and tuna sandwich. Pt instructed to monitor her dietary intake carefully. Pt stated she covered herself for glucose 308

## 2017-03-01 NOTE — ED PROVIDER NOTE - ATTENDING CONTRIBUTION TO CARE
MD Marie:  patient seen and evaluated with the PA.  I was present for key portions of the History and Physical, and I agree with the Impression and Plan.    MD Marie:  62 yo F, c/o SOB x 1day; onset 5am. MD Marie:  patient seen and evaluated with the PA.  I was present for key portions of the History and Physical, and I agree with the Impression and Plan.    MD Marie:  62 yo F, c/o SOB x 1day; onset 5am.  MHx significant for ESRD, significant CAD (multiple stents).  Quality of SOB is like that before she develops ACS, but it is sometimes difficult for her to tell the difference between that and ESRD-related fluid overload.  Assoc. Sx - chronic unchanged LLE edema.  VS - wnl. Physical Exam: adult F, chronically ill-appearing, slight tachypnea, 1+ LLE edema, no edema RLE.  Lungs clear.  Impression:  ESRD/fluid overload vs ACS.  Plan:  admit for serial enzymes, renal evaluation, and cardiology eval.  Case d/w Primary Cardiologist (aDne), Nephrologist (Brayan), and prior admitting attending (Dr. Viera).

## 2017-03-02 LAB
ANION GAP SERPL CALC-SCNC: 16 MMOL/L — SIGNIFICANT CHANGE UP (ref 5–17)
BUN SERPL-MCNC: 17 MG/DL — SIGNIFICANT CHANGE UP (ref 7–23)
CALCIUM SERPL-MCNC: 8.6 MG/DL — SIGNIFICANT CHANGE UP (ref 8.4–10.5)
CHLORIDE SERPL-SCNC: 95 MMOL/L — LOW (ref 96–108)
CK MB BLD-MCNC: 2.3 % — SIGNIFICANT CHANGE UP (ref 0–3.5)
CK MB CFR SERPL CALC: 2.8 NG/ML — SIGNIFICANT CHANGE UP (ref 0–3.8)
CK MB CFR SERPL CALC: 3 NG/ML — SIGNIFICANT CHANGE UP (ref 0–3.8)
CK SERPL-CCNC: 122 U/L — SIGNIFICANT CHANGE UP (ref 25–170)
CO2 SERPL-SCNC: 26 MMOL/L — SIGNIFICANT CHANGE UP (ref 22–31)
CREAT SERPL-MCNC: 3.74 MG/DL — HIGH (ref 0.5–1.3)
GLUCOSE SERPL-MCNC: 290 MG/DL — HIGH (ref 70–99)
HAV IGM SER-ACNC: SIGNIFICANT CHANGE UP
HBV CORE AB SER-ACNC: SIGNIFICANT CHANGE UP
HBV CORE IGM SER-ACNC: SIGNIFICANT CHANGE UP
HBV SURFACE AB SER-ACNC: <3 MIU/ML — LOW
HBV SURFACE AG SER-ACNC: SIGNIFICANT CHANGE UP
HCT VFR BLD CALC: 32 % — LOW (ref 34.5–45)
HCV AB S/CO SERPL IA: 0.18 S/CO — SIGNIFICANT CHANGE UP
HCV AB SERPL-IMP: SIGNIFICANT CHANGE UP
HGB BLD-MCNC: 10.5 G/DL — LOW (ref 11.5–15.5)
MCHC RBC-ENTMCNC: 32.7 PG — SIGNIFICANT CHANGE UP (ref 27–34)
MCHC RBC-ENTMCNC: 32.9 GM/DL — SIGNIFICANT CHANGE UP (ref 32–36)
MCV RBC AUTO: 99.4 FL — SIGNIFICANT CHANGE UP (ref 80–100)
PLATELET # BLD AUTO: 242 K/UL — SIGNIFICANT CHANGE UP (ref 150–400)
POTASSIUM SERPL-MCNC: 4.6 MMOL/L — SIGNIFICANT CHANGE UP (ref 3.5–5.3)
POTASSIUM SERPL-SCNC: 4.6 MMOL/L — SIGNIFICANT CHANGE UP (ref 3.5–5.3)
RBC # BLD: 3.22 M/UL — LOW (ref 3.8–5.2)
RBC # FLD: 12.1 % — SIGNIFICANT CHANGE UP (ref 10.3–14.5)
SODIUM SERPL-SCNC: 137 MMOL/L — SIGNIFICANT CHANGE UP (ref 135–145)
TROPONIN T SERPL-MCNC: 0.05 NG/ML — SIGNIFICANT CHANGE UP (ref 0–0.06)
TROPONIN T SERPL-MCNC: 0.09 NG/ML — HIGH (ref 0–0.06)
WBC # BLD: 5.4 K/UL — SIGNIFICANT CHANGE UP (ref 3.8–10.5)
WBC # FLD AUTO: 5.4 K/UL — SIGNIFICANT CHANGE UP (ref 3.8–10.5)

## 2017-03-02 PROCEDURE — 99232 SBSQ HOSP IP/OBS MODERATE 35: CPT

## 2017-03-02 RX ORDER — INSULIN ASPART 100 [IU]/ML
1 INJECTION, SOLUTION SUBCUTANEOUS
Qty: 0 | Refills: 0 | Status: DISCONTINUED | OUTPATIENT
Start: 2017-03-02 | End: 2017-03-03

## 2017-03-02 RX ORDER — LISINOPRIL 2.5 MG/1
20 TABLET ORAL DAILY
Qty: 0 | Refills: 0 | Status: DISCONTINUED | OUTPATIENT
Start: 2017-03-02 | End: 2017-03-03

## 2017-03-02 RX ORDER — LOSARTAN POTASSIUM 100 MG/1
50 TABLET, FILM COATED ORAL DAILY
Qty: 0 | Refills: 0 | Status: DISCONTINUED | OUTPATIENT
Start: 2017-03-02 | End: 2017-03-02

## 2017-03-02 RX ADMIN — Medication 1 TABLET(S): at 09:34

## 2017-03-02 RX ADMIN — SEVELAMER CARBONATE 2400 MILLIGRAM(S): 2400 POWDER, FOR SUSPENSION ORAL at 13:44

## 2017-03-02 RX ADMIN — Medication 81 MILLIGRAM(S): at 09:34

## 2017-03-02 RX ADMIN — SEVELAMER CARBONATE 2400 MILLIGRAM(S): 2400 POWDER, FOR SUSPENSION ORAL at 09:34

## 2017-03-02 RX ADMIN — Medication 500 MILLIGRAM(S): at 17:30

## 2017-03-02 RX ADMIN — HEPARIN SODIUM 5000 UNIT(S): 5000 INJECTION INTRAVENOUS; SUBCUTANEOUS at 05:02

## 2017-03-02 RX ADMIN — Medication 50 MILLIGRAM(S): at 05:02

## 2017-03-02 RX ADMIN — CINACALCET 30 MILLIGRAM(S): 30 TABLET, FILM COATED ORAL at 22:04

## 2017-03-02 RX ADMIN — CLOPIDOGREL BISULFATE 75 MILLIGRAM(S): 75 TABLET, FILM COATED ORAL at 22:04

## 2017-03-02 RX ADMIN — Medication 40 MILLIGRAM(S): at 05:02

## 2017-03-02 RX ADMIN — LISINOPRIL 20 MILLIGRAM(S): 2.5 TABLET ORAL at 17:30

## 2017-03-02 RX ADMIN — ATORVASTATIN CALCIUM 20 MILLIGRAM(S): 80 TABLET, FILM COATED ORAL at 22:04

## 2017-03-02 RX ADMIN — SEVELAMER CARBONATE 2400 MILLIGRAM(S): 2400 POWDER, FOR SUSPENSION ORAL at 17:30

## 2017-03-02 RX ADMIN — DULOXETINE HYDROCHLORIDE 60 MILLIGRAM(S): 30 CAPSULE, DELAYED RELEASE ORAL at 09:34

## 2017-03-02 RX ADMIN — Medication 500 MILLIGRAM(S): at 05:03

## 2017-03-02 RX ADMIN — HEPARIN SODIUM 5000 UNIT(S): 5000 INJECTION INTRAVENOUS; SUBCUTANEOUS at 17:30

## 2017-03-02 NOTE — DIETITIAN INITIAL EVALUATION ADULT. - NS AS NUTRI INTERV MEALS SNACK
Fluid - modified diet/Potassium, salt restricted, Diabetic, moderate protein, 1000 cc fluid/Mineral - modified diet/General/healthful diet/Carbohydrate - modified diet

## 2017-03-02 NOTE — DIETITIAN INITIAL EVALUATION ADULT. - ENERGY NEEDS
HT 63 inches,  pounds,  pounds,  pounds, BMI 24.8  DXd with right axillary cellulitis, SOB, orthopenia  Skin intact.

## 2017-03-02 NOTE — DIETITIAN INITIAL EVALUATION ADULT. - OTHER INFO
Patient referred for Dialysis.  Patient found eating breakfast in bed.  Dentures at bedside but not wearing.  Gumming breakfast. Patient is well know to nutrition services from numerous admissions.  Patient reports to see RD at HD and patient able to demonstrate her knowledge of diet.  Reports to avoid all vegetables although she likes veggies.  Eats a lot of eggs, chicken, rice, noodles.  Well aware of potassium and sodium  content of foods.  Reports to count CHO with dosing of insulin pump.  Weight gain 10 pounds recently and patient feels this is due to fluid.. Admitted with SOB and leg edema.  Takes MVI at home.  Patient reports to check finger sticks at home and number vary high to low.  Patient reports to be a diabetic since she was a kid but did not know what an A1c was.  This RD reviewed what an A1c reveals regarding management and advised patient to ask MD what her number are with blood work.

## 2017-03-03 ENCOUNTER — TRANSCRIPTION ENCOUNTER (OUTPATIENT)
Age: 62
End: 2017-03-03

## 2017-03-03 VITALS — WEIGHT: 130.95 LBS

## 2017-03-03 LAB
ANION GAP SERPL CALC-SCNC: 17 MMOL/L — SIGNIFICANT CHANGE UP (ref 5–17)
BUN SERPL-MCNC: 19 MG/DL — SIGNIFICANT CHANGE UP (ref 7–23)
CALCIUM SERPL-MCNC: 8.6 MG/DL — SIGNIFICANT CHANGE UP (ref 8.4–10.5)
CHLORIDE SERPL-SCNC: 91 MMOL/L — LOW (ref 96–108)
CO2 SERPL-SCNC: 24 MMOL/L — SIGNIFICANT CHANGE UP (ref 22–31)
CREAT SERPL-MCNC: 3.07 MG/DL — HIGH (ref 0.5–1.3)
GLUCOSE SERPL-MCNC: 486 MG/DL — CRITICAL HIGH (ref 70–99)
POTASSIUM SERPL-MCNC: 4 MMOL/L — SIGNIFICANT CHANGE UP (ref 3.5–5.3)
POTASSIUM SERPL-SCNC: 4 MMOL/L — SIGNIFICANT CHANGE UP (ref 3.5–5.3)
SODIUM SERPL-SCNC: 132 MMOL/L — LOW (ref 135–145)

## 2017-03-03 PROCEDURE — 99233 SBSQ HOSP IP/OBS HIGH 50: CPT

## 2017-03-03 PROCEDURE — 99231 SBSQ HOSP IP/OBS SF/LOW 25: CPT

## 2017-03-03 PROCEDURE — 10021 FNA BX W/O IMG GDN 1ST LES: CPT

## 2017-03-03 RX ORDER — CEPHALEXIN 500 MG
1 CAPSULE ORAL
Qty: 10 | Refills: 0 | OUTPATIENT
Start: 2017-03-03 | End: 2017-03-08

## 2017-03-03 RX ORDER — INSULIN ASPART 100 [IU]/ML
1 INJECTION, SOLUTION SUBCUTANEOUS
Qty: 0 | Refills: 0 | COMMUNITY
Start: 2017-03-03

## 2017-03-03 RX ORDER — LISINOPRIL 2.5 MG/1
40 TABLET ORAL DAILY
Qty: 0 | Refills: 0 | Status: DISCONTINUED | OUTPATIENT
Start: 2017-03-03 | End: 2017-03-03

## 2017-03-03 RX ORDER — LISINOPRIL 2.5 MG/1
20 TABLET ORAL ONCE
Qty: 0 | Refills: 0 | Status: COMPLETED | OUTPATIENT
Start: 2017-03-03 | End: 2017-03-03

## 2017-03-03 RX ADMIN — LISINOPRIL 20 MILLIGRAM(S): 2.5 TABLET ORAL at 05:23

## 2017-03-03 RX ADMIN — HEPARIN SODIUM 5000 UNIT(S): 5000 INJECTION INTRAVENOUS; SUBCUTANEOUS at 05:23

## 2017-03-03 RX ADMIN — Medication 500 MILLIGRAM(S): at 17:03

## 2017-03-03 RX ADMIN — LISINOPRIL 20 MILLIGRAM(S): 2.5 TABLET ORAL at 15:52

## 2017-03-03 RX ADMIN — Medication 1 TABLET(S): at 11:30

## 2017-03-03 RX ADMIN — Medication 81 MILLIGRAM(S): at 11:28

## 2017-03-03 RX ADMIN — SEVELAMER CARBONATE 2400 MILLIGRAM(S): 2400 POWDER, FOR SUSPENSION ORAL at 17:03

## 2017-03-03 RX ADMIN — Medication 40 MILLIGRAM(S): at 05:22

## 2017-03-03 RX ADMIN — Medication 500 MILLIGRAM(S): at 05:22

## 2017-03-03 RX ADMIN — DULOXETINE HYDROCHLORIDE 60 MILLIGRAM(S): 30 CAPSULE, DELAYED RELEASE ORAL at 11:28

## 2017-03-03 RX ADMIN — Medication 50 MILLIGRAM(S): at 05:22

## 2017-03-03 RX ADMIN — SEVELAMER CARBONATE 2400 MILLIGRAM(S): 2400 POWDER, FOR SUSPENSION ORAL at 09:54

## 2017-03-03 RX ADMIN — SEVELAMER CARBONATE 2400 MILLIGRAM(S): 2400 POWDER, FOR SUSPENSION ORAL at 11:30

## 2017-03-03 NOTE — DISCHARGE NOTE ADULT - MEDICATION SUMMARY - MEDICATIONS TO TAKE
I will START or STAY ON the medications listed below when I get home from the hospital:    insulin pump (Novolog)  --     -- Indication: For Diabetes mellitus type 1    oxyCODONE-acetaminophen 5mg-325mg oral tablet  -- 1 tab(s) by mouth once a day (at bedtime), As Needed -Mild Pain  -- Rx is for four times daily, pt. takes only once a day  -- Indication: For Pain     Junior Aspirin Regimen 81 mg oral delayed release tablet  -- 1 tab(s) by mouth once a day (at bedtime)  -- Indication: For CAD (coronary artery disease)    lisinopril 40 mg oral tablet  -- 1 tab(s) by mouth once a day MDD:1  -- Indication: For HTN (hypertension)    DULoxetine 60 mg oral delayed release capsule  -- 1 cap(s) by mouth once a day (at bedtime)  -- Indication: For Anxieity    insulin aspart 100 units/mL subcutaneous solution  -- 1 each subcutaneous   -- Indication: For Diabetes mellitus type 1    atorvastatin 20 mg oral tablet  -- 1 tab(s) by mouth once a day (at bedtime)  -- Indication: For CAD (coronary artery disease)    clopidogrel 75 mg oral tablet  -- 1 tab(s) by mouth once a day (at bedtime)  -- Indication: For CAD (coronary artery disease)    metoprolol succinate 50 mg oral tablet, extended release  -- 1 tab(s) by mouth once a day (at bedtime)  -- Indication: For CAD (coronary artery disease)    cephalexin 500 mg oral capsule  -- 1 cap(s) by mouth every 12 hours MDD:2  -- Indication: For Abscess of axilla, right    Lasix 40 mg oral tablet  -- 1 tab(s) by mouth 3 times a week on Sunday, Monday and Friday (at bedtime)  -- Indication: For CAD (coronary artery disease)    Sensipar 30 mg oral tablet  -- 1 tab(s) by mouth once a day (at bedtime)  -- Indication: For Hyperparothyroidism    Renvela 800 mg oral tablet  -- 3 tab(s) by mouth 3 times a day  -- Indication: For ESRD (end stage renal disease)    multivitamin  -- 1 tab(s) by mouth once a day (at bedtime)  -- Indication: For Supplement I will START or STAY ON the medications listed below when I get home from the hospital:    insulin pump (Novolog)  --     -- Indication: For Diabetes mellitus type 1    Junior Aspirin Regimen 81 mg oral delayed release tablet  -- 1 tab(s) by mouth once a day (at bedtime)  -- Indication: For CAD (coronary artery disease)    oxyCODONE-acetaminophen 5mg-325mg oral tablet  -- 1 tab(s) by mouth every 6 hours, As Needed -Mild Pain MDD:4  -- Rx is for four times daily, pt. takes only once a day  -- Indication: For Abscess of axilla, right    lisinopril 40 mg oral tablet  -- 1 tab(s) by mouth once a day MDD:1  -- Indication: For HTN (hypertension)    DULoxetine 60 mg oral delayed release capsule  -- 1 cap(s) by mouth once a day (at bedtime)  -- Indication: For Anxieity    insulin aspart 100 units/mL subcutaneous solution  -- 1 each subcutaneous   -- Indication: For Diabetes mellitus type 1    atorvastatin 20 mg oral tablet  -- 1 tab(s) by mouth once a day (at bedtime)  -- Indication: For CAD (coronary artery disease)    clopidogrel 75 mg oral tablet  -- 1 tab(s) by mouth once a day (at bedtime)  -- Indication: For CAD (coronary artery disease)    metoprolol succinate 50 mg oral tablet, extended release  -- 1 tab(s) by mouth once a day (at bedtime)  -- Indication: For CAD (coronary artery disease)    cephalexin 500 mg oral capsule  -- 1 cap(s) by mouth every 12 hours MDD:2  -- Indication: For Abscess of axilla, right    Lasix 40 mg oral tablet  -- 1 tab(s) by mouth 3 times a week on Sunday, Monday and Friday (at bedtime)  -- Indication: For CAD (coronary artery disease)    Sensipar 30 mg oral tablet  -- 1 tab(s) by mouth once a day (at bedtime)  -- Indication: For Hyperparothyroidism    Renvela 800 mg oral tablet  -- 3 tab(s) by mouth 3 times a day  -- Indication: For ESRD (end stage renal disease)    multivitamin  -- 1 tab(s) by mouth once a day (at bedtime)  -- Indication: For Supplement

## 2017-03-03 NOTE — DISCHARGE NOTE ADULT - PLAN OF CARE
Abcess will continue to resolve. 1. Continue Keflex for 5 additional days.   2. Warm compress 5 times a day.   3. Follow up with Dr. Noman Pollock in 1 week to assess the abcess.  Call [phone] for an appointment. ESRD will continue to be stable. 1. Continue dialysis as scheduled. Diabetes will be well controlled. 1. Follow [facility] ith Dr. ZANDRA Ley re: Insulin pump.   2. Keep blood sugars tightly controlled. CAD will be stable. Coronary artery disease is a condition where the arteries the supply the heart muscle get clogges with fatty deposits & puts you at risk for a heart attack  Call your doctor if you have any new pain, pressure, or discomfort in the center of your chest, pain, tingling or discomfort in arms, back, neck, jaw, or stomach, shortness of breath, nausea, vomiting, burping or heartburn, sweating, cold and clammy skin, racing or abnormal heartbeat for more than 10 minutes or if they keep coming & going.  Call 911 and do not tr to get to hospital by care  You can help yourself with lefestyle changes (quitting smoking if you smoke), eat lots of fruits & vegetables & low fat dairy products, not a lot of meat & fatty foods, walk or some form of physical activity most days of the week, lose weight if you are overweight  Take your cardiac medication as prescribed to lower cholesterol, to lower blood pressure, aspirin to prevent blood clots, and diabetes control  Make sure to keep appointments with doctor for cardiac follow up care HTN will be stable. Low salt diet  Activity as tolerated.  Take all medication as prescribed.  Follow up with your medical doctor for routine blood pressure monitoring at your next visit.  Notify your doctor if you have any of the following symptoms:   Dizziness, Lightheadedness, Blurry vision, Headache, Chest pain, Shortness of breath

## 2017-03-03 NOTE — DISCHARGE NOTE ADULT - SECONDARY DIAGNOSIS.
ESRD (end stage renal disease) Diabetes mellitus type 1 CAD (coronary artery disease) HTN (hypertension)

## 2017-03-03 NOTE — DISCHARGE NOTE ADULT - CARE PLAN
Principal Discharge DX:	Abscess of axilla, right  Secondary Diagnosis:	ESRD (end stage renal disease)  Secondary Diagnosis:	Diabetes mellitus type 1  Secondary Diagnosis:	CAD (coronary artery disease)  Secondary Diagnosis:	HTN (hypertension) Principal Discharge DX:	Abscess of axilla, right  Goal:	Abcess will continue to resolve.  Instructions for follow-up, activity and diet:	1. Continue Keflex for 5 additional days.   2. Warm compress 5 times a day.   3. Follow up with Dr. Noman Pollock in 1 week to assess the abcess.  Call [phone] for an appointment.  Secondary Diagnosis:	ESRD (end stage renal disease)  Goal:	ESRD will continue to be stable.  Instructions for follow-up, activity and diet:	1. Continue dialysis as scheduled.  Secondary Diagnosis:	Diabetes mellitus type 1  Goal:	Diabetes will be well controlled.  Instructions for follow-up, activity and diet:	1. Follow [facility] ith Dr. ZANDRA Ley re: Insulin pump.   2. Keep blood sugars tightly controlled.  Secondary Diagnosis:	CAD (coronary artery disease)  Goal:	CAD will be stable.  Instructions for follow-up, activity and diet:	Coronary artery disease is a condition where the arteries the supply the heart muscle get clogges with fatty deposits & puts you at risk for a heart attack  Call your doctor if you have any new pain, pressure, or discomfort in the center of your chest, pain, tingling or discomfort in arms, back, neck, jaw, or stomach, shortness of breath, nausea, vomiting, burping or heartburn, sweating, cold and clammy skin, racing or abnormal heartbeat for more than 10 minutes or if they keep coming & going.  Call 911 and do not tr to get to hospital by care  You can help yourself with lefestyle changes (quitting smoking if you smoke), eat lots of fruits & vegetables & low fat dairy products, not a lot of meat & fatty foods, walk or some form of physical activity most days of the week, lose weight if you are overweight  Take your cardiac medication as prescribed to lower cholesterol, to lower blood pressure, aspirin to prevent blood clots, and diabetes control  Make sure to keep appointments with doctor for cardiac follow up care  Secondary Diagnosis:	HTN (hypertension)  Goal:	HTN will be stable.  Instructions for follow-up, activity and diet:	Low salt diet  Activity as tolerated.  Take all medication as prescribed.  Follow up with your medical doctor for routine blood pressure monitoring at your next visit.  Notify your doctor if you have any of the following symptoms:   Dizziness, Lightheadedness, Blurry vision, Headache, Chest pain, Shortness of breath Principal Discharge DX:	Abscess of axilla, right  Goal:	Abcess will continue to resolve.  Instructions for follow-up, activity and diet:	1. Continue Keflex for 5 additional days.   2. Warm compress 5 times a day.   3. Follow up with Dr. Noman Pollock in 1 week to assess the abcess.  Call [phone] for an appointment.  Secondary Diagnosis:	ESRD (end stage renal disease)  Goal:	ESRD will continue to be stable.  Instructions for follow-up, activity and diet:	1. Continue dialysis as scheduled.  Secondary Diagnosis:	Diabetes mellitus type 1  Goal:	Diabetes will be well controlled.  Instructions for follow-up, activity and diet:	1. Follow [facility] ith Dr. ZANDRA Ley re: Insulin pump.   2. Keep blood sugars tightly controlled.  Secondary Diagnosis:	CAD (coronary artery disease)  Goal:	CAD will be stable.  Instructions for follow-up, activity and diet:	Coronary artery disease is a condition where the arteries the supply the heart muscle get clogges with fatty deposits & puts you at risk for a heart attack  Call your doctor if you have any new pain, pressure, or discomfort in the center of your chest, pain, tingling or discomfort in arms, back, neck, jaw, or stomach, shortness of breath, nausea, vomiting, burping or heartburn, sweating, cold and clammy skin, racing or abnormal heartbeat for more than 10 minutes or if they keep coming & going.  Call 911 and do not tr to get to hospital by care  You can help yourself with lefestyle changes (quitting smoking if you smoke), eat lots of fruits & vegetables & low fat dairy products, not a lot of meat & fatty foods, walk or some form of physical activity most days of the week, lose weight if you are overweight  Take your cardiac medication as prescribed to lower cholesterol, to lower blood pressure, aspirin to prevent blood clots, and diabetes control  Make sure to keep appointments with doctor for cardiac follow up care  Secondary Diagnosis:	HTN (hypertension)  Goal:	HTN will be stable.  Instructions for follow-up, activity and diet:	Low salt diet  Activity as tolerated.  Take all medication as prescribed.  Follow up with your medical doctor for routine blood pressure monitoring at your next visit.  Notify your doctor if you have any of the following symptoms:   Dizziness, Lightheadedness, Blurry vision, Headache, Chest pain, Shortness of breath Principal Discharge DX:	Abscess of axilla, right  Goal:	Abcess will continue to resolve.  Instructions for follow-up, activity and diet:	1. Continue Keflex for 5 additional days.   2. Warm compress 5 times a day.   3. Follow up with Dr. Noman Pollock in 1 week to assess the abcess.  Call [phone] for an appointment.  Secondary Diagnosis:	ESRD (end stage renal disease)  Goal:	ESRD will continue to be stable.  Instructions for follow-up, activity and diet:	1. Continue dialysis as scheduled.  Secondary Diagnosis:	Diabetes mellitus type 1  Goal:	Diabetes will be well controlled.  Instructions for follow-up, activity and diet:	1. Follow [facility] ith Dr. ZANDRA Ley re: Insulin pump.   2. Keep blood sugars tightly controlled.  Secondary Diagnosis:	CAD (coronary artery disease)  Goal:	CAD will be stable.  Instructions for follow-up, activity and diet:	Coronary artery disease is a condition where the arteries the supply the heart muscle get clogges with fatty deposits & puts you at risk for a heart attack  Call your doctor if you have any new pain, pressure, or discomfort in the center of your chest, pain, tingling or discomfort in arms, back, neck, jaw, or stomach, shortness of breath, nausea, vomiting, burping or heartburn, sweating, cold and clammy skin, racing or abnormal heartbeat for more than 10 minutes or if they keep coming & going.  Call 911 and do not tr to get to hospital by care  You can help yourself with lefestyle changes (quitting smoking if you smoke), eat lots of fruits & vegetables & low fat dairy products, not a lot of meat & fatty foods, walk or some form of physical activity most days of the week, lose weight if you are overweight  Take your cardiac medication as prescribed to lower cholesterol, to lower blood pressure, aspirin to prevent blood clots, and diabetes control  Make sure to keep appointments with doctor for cardiac follow up care  Secondary Diagnosis:	HTN (hypertension)  Goal:	HTN will be stable.  Instructions for follow-up, activity and diet:	Low salt diet  Activity as tolerated.  Take all medication as prescribed.  Follow up with your medical doctor for routine blood pressure monitoring at your next visit.  Notify your doctor if you have any of the following symptoms:   Dizziness, Lightheadedness, Blurry vision, Headache, Chest pain, Shortness of breath Principal Discharge DX:	Abscess of axilla, right  Goal:	Abcess will continue to resolve.  Instructions for follow-up, activity and diet:	1. Continue Keflex for 5 additional days.   2. Warm compress 5 times a day.   3. Follow up with Dr. Noman Pollock in 1 week to assess the abcess.  Call [phone] for an appointment.  Secondary Diagnosis:	ESRD (end stage renal disease)  Goal:	ESRD will continue to be stable.  Instructions for follow-up, activity and diet:	1. Continue dialysis as scheduled.  Secondary Diagnosis:	Diabetes mellitus type 1  Goal:	Diabetes will be well controlled.  Instructions for follow-up, activity and diet:	1. Follow [facility] ith Dr. ZANDRA Ley re: Insulin pump.   2. Keep blood sugars tightly controlled.  Secondary Diagnosis:	CAD (coronary artery disease)  Goal:	CAD will be stable.  Instructions for follow-up, activity and diet:	Coronary artery disease is a condition where the arteries the supply the heart muscle get clogges with fatty deposits & puts you at risk for a heart attack  Call your doctor if you have any new pain, pressure, or discomfort in the center of your chest, pain, tingling or discomfort in arms, back, neck, jaw, or stomach, shortness of breath, nausea, vomiting, burping or heartburn, sweating, cold and clammy skin, racing or abnormal heartbeat for more than 10 minutes or if they keep coming & going.  Call 911 and do not tr to get to hospital by care  You can help yourself with lefestyle changes (quitting smoking if you smoke), eat lots of fruits & vegetables & low fat dairy products, not a lot of meat & fatty foods, walk or some form of physical activity most days of the week, lose weight if you are overweight  Take your cardiac medication as prescribed to lower cholesterol, to lower blood pressure, aspirin to prevent blood clots, and diabetes control  Make sure to keep appointments with doctor for cardiac follow up care  Secondary Diagnosis:	HTN (hypertension)  Goal:	HTN will be stable.  Instructions for follow-up, activity and diet:	Low salt diet  Activity as tolerated.  Take all medication as prescribed.  Follow up with your medical doctor for routine blood pressure monitoring at your next visit.  Notify your doctor if you have any of the following symptoms:   Dizziness, Lightheadedness, Blurry vision, Headache, Chest pain, Shortness of breath Principal Discharge DX:	Abscess of axilla, right  Goal:	Abcess will continue to resolve.  Instructions for follow-up, activity and diet:	1. Continue Keflex for 5 additional days.   2. Warm compress 5 times a day.   3. Follow up with Dr. Noman Pollock in 1 week to assess the abcess.  Call [phone] for an appointment.  Secondary Diagnosis:	ESRD (end stage renal disease)  Goal:	ESRD will continue to be stable.  Instructions for follow-up, activity and diet:	1. Continue dialysis as scheduled.  Secondary Diagnosis:	Diabetes mellitus type 1  Goal:	Diabetes will be well controlled.  Instructions for follow-up, activity and diet:	1. Follow [facility] ith Dr. ZANDRA Ley re: Insulin pump.   2. Keep blood sugars tightly controlled.  Secondary Diagnosis:	CAD (coronary artery disease)  Goal:	CAD will be stable.  Instructions for follow-up, activity and diet:	Coronary artery disease is a condition where the arteries the supply the heart muscle get clogges with fatty deposits & puts you at risk for a heart attack  Call your doctor if you have any new pain, pressure, or discomfort in the center of your chest, pain, tingling or discomfort in arms, back, neck, jaw, or stomach, shortness of breath, nausea, vomiting, burping or heartburn, sweating, cold and clammy skin, racing or abnormal heartbeat for more than 10 minutes or if they keep coming & going.  Call 911 and do not tr to get to hospital by care  You can help yourself with lefestyle changes (quitting smoking if you smoke), eat lots of fruits & vegetables & low fat dairy products, not a lot of meat & fatty foods, walk or some form of physical activity most days of the week, lose weight if you are overweight  Take your cardiac medication as prescribed to lower cholesterol, to lower blood pressure, aspirin to prevent blood clots, and diabetes control  Make sure to keep appointments with doctor for cardiac follow up care  Secondary Diagnosis:	HTN (hypertension)  Goal:	HTN will be stable.  Instructions for follow-up, activity and diet:	Low salt diet  Activity as tolerated.  Take all medication as prescribed.  Follow up with your medical doctor for routine blood pressure monitoring at your next visit.  Notify your doctor if you have any of the following symptoms:   Dizziness, Lightheadedness, Blurry vision, Headache, Chest pain, Shortness of breath

## 2017-03-03 NOTE — DISCHARGE NOTE ADULT - HOSPITAL COURSE
To be done by Attending. 61F with PMhx CAD, MI, s/p stents, HTN, HLD, ESRD on HD(4 times a week), ? dCHF, PVD s/p bilateral bypass, left subclavian vein stent, DM I on insulin pump, DMA, CVA, TIA, recent admission in 1/2017 for DKA, ACS s/p cardiac cath with PTCA/atherectomy presents today with complaint of SOB and orthopnea since 5 AM suspected possibly due to ADHF vs ACS. Patient currently denies SOB. cardiac enzyme n at 8 PM  3/2:	Pt seen by Surgery on 3/1.  S/p attempted needle aspiration. No pus was ilicited.   	Continue pt on Keflex.  3/3:	Seen by Endocrine and Cardiology.  Will continue to monitor blood sugars.    	Seen by Surgery.  No surgical intervention, however continuing to monitor   	appearance of abcess under right axilla.  Seen by Renal.  Increase Lisinopril   	to 40 mg daily.   Medically stable. DC home.

## 2017-03-03 NOTE — DISCHARGE NOTE ADULT - NS MD DC FALL RISK RISK
For information on Fall & Injury Prevention, visit [email or web address]

## 2017-03-03 NOTE — DISCHARGE NOTE ADULT - PATIENT PORTAL LINK FT
“You can access the FollowHealth Patient Portal, offered by [facility], by registering with the following website: [email or web address]”

## 2017-03-03 NOTE — DISCHARGE NOTE ADULT - ADDITIONAL INSTRUCTIONS
Follow up in 2 weeks with Dr. Vela for Brachytherapy.    Follow up with Dr. Pollock, Surgery, to evaluate abscess.   Follow up with her PMD in 1 week.   Follow up with her Endocrinologist next week to regulate insulin pump.

## 2017-03-03 NOTE — DISCHARGE NOTE ADULT - CARE PROVIDER_API CALL
Noman Pollock), Surgery; Surgical Critical Care  [address] [place] [address]  Phone: [phone]  Fax: [phone]    Arsalan Castro), Internal Medicine  [id number] [place] [address]  Phone: [phone]  Fax: [phone]    Pina Ley (SUSAN), EndocrinologyMetabDiabetes  [address] [address] [place] [address]  Phone: [phone]  Fax: [phone]

## 2017-03-29 ENCOUNTER — TRANSCRIPTION ENCOUNTER (OUTPATIENT)
Age: 62
End: 2017-03-29

## 2017-03-29 ENCOUNTER — OUTPATIENT (OUTPATIENT)
Dept: OUTPATIENT SERVICES | Facility: HOSPITAL | Age: 62
LOS: 1 days | End: 2017-03-29
Payer: MEDICARE

## 2017-03-29 VITALS
TEMPERATURE: 98 F | RESPIRATION RATE: 16 BRPM | SYSTOLIC BLOOD PRESSURE: 183 MMHG | HEIGHT: 64 IN | HEART RATE: 68 BPM | DIASTOLIC BLOOD PRESSURE: 81 MMHG | WEIGHT: 130.95 LBS | OXYGEN SATURATION: 98 %

## 2017-03-29 DIAGNOSIS — I25.9 CHRONIC ISCHEMIC HEART DISEASE, UNSPECIFIED: ICD-10-CM

## 2017-03-29 DIAGNOSIS — Z98.89 OTHER SPECIFIED POSTPROCEDURAL STATES: Chronic | ICD-10-CM

## 2017-03-29 LAB
ALBUMIN SERPL ELPH-MCNC: 4.2 G/DL — SIGNIFICANT CHANGE UP (ref 3.3–5)
ALP SERPL-CCNC: 275 U/L — HIGH (ref 40–120)
ALT FLD-CCNC: 11 U/L RC — SIGNIFICANT CHANGE UP (ref 10–45)
ANION GAP SERPL CALC-SCNC: 19 MMOL/L — HIGH (ref 5–17)
AST SERPL-CCNC: 18 U/L — SIGNIFICANT CHANGE UP (ref 10–40)
BILIRUB SERPL-MCNC: 0.3 MG/DL — SIGNIFICANT CHANGE UP (ref 0.2–1.2)
BUN SERPL-MCNC: 27 MG/DL — HIGH (ref 7–23)
CALCIUM SERPL-MCNC: 8.8 MG/DL — SIGNIFICANT CHANGE UP (ref 8.4–10.5)
CHLORIDE SERPL-SCNC: 95 MMOL/L — LOW (ref 96–108)
CO2 SERPL-SCNC: 27 MMOL/L — SIGNIFICANT CHANGE UP (ref 22–31)
CREAT SERPL-MCNC: 6.34 MG/DL — HIGH (ref 0.5–1.3)
GLUCOSE SERPL-MCNC: 151 MG/DL — HIGH (ref 70–99)
HCT VFR BLD CALC: 34.2 % — LOW (ref 34.5–45)
HGB BLD-MCNC: 11.1 G/DL — LOW (ref 11.5–15.5)
MCHC RBC-ENTMCNC: 32.2 PG — SIGNIFICANT CHANGE UP (ref 27–34)
MCHC RBC-ENTMCNC: 32.5 GM/DL — SIGNIFICANT CHANGE UP (ref 32–36)
MCV RBC AUTO: 99.2 FL — SIGNIFICANT CHANGE UP (ref 80–100)
PLATELET # BLD AUTO: 233 K/UL — SIGNIFICANT CHANGE UP (ref 150–400)
POTASSIUM SERPL-MCNC: 4.8 MMOL/L — SIGNIFICANT CHANGE UP (ref 3.5–5.3)
POTASSIUM SERPL-SCNC: 4.8 MMOL/L — SIGNIFICANT CHANGE UP (ref 3.5–5.3)
PROT SERPL-MCNC: 7.3 G/DL — SIGNIFICANT CHANGE UP (ref 6–8.3)
RBC # BLD: 3.45 M/UL — LOW (ref 3.8–5.2)
RBC # FLD: 12.5 % — SIGNIFICANT CHANGE UP (ref 10.3–14.5)
SODIUM SERPL-SCNC: 141 MMOL/L — SIGNIFICANT CHANGE UP (ref 135–145)
WBC # BLD: 5.4 K/UL — SIGNIFICANT CHANGE UP (ref 3.8–10.5)
WBC # FLD AUTO: 5.4 K/UL — SIGNIFICANT CHANGE UP (ref 3.8–10.5)

## 2017-03-29 PROCEDURE — 93010 ELECTROCARDIOGRAM REPORT: CPT

## 2017-03-29 RX ORDER — DEXTROSE 50 % IN WATER 50 %
1 SYRINGE (ML) INTRAVENOUS ONCE
Qty: 0 | Refills: 0 | Status: DISCONTINUED | OUTPATIENT
Start: 2017-03-29 | End: 2017-03-30

## 2017-03-29 RX ORDER — DEXTROSE 50 % IN WATER 50 %
25 SYRINGE (ML) INTRAVENOUS ONCE
Qty: 0 | Refills: 0 | Status: DISCONTINUED | OUTPATIENT
Start: 2017-03-29 | End: 2017-03-30

## 2017-03-29 RX ORDER — CLOPIDOGREL BISULFATE 75 MG/1
75 TABLET, FILM COATED ORAL AT BEDTIME
Qty: 0 | Refills: 0 | Status: DISCONTINUED | OUTPATIENT
Start: 2017-03-29 | End: 2017-03-30

## 2017-03-29 RX ORDER — DEXTROSE 50 % IN WATER 50 %
12.5 SYRINGE (ML) INTRAVENOUS ONCE
Qty: 0 | Refills: 0 | Status: DISCONTINUED | OUTPATIENT
Start: 2017-03-29 | End: 2017-03-30

## 2017-03-29 RX ORDER — ATORVASTATIN CALCIUM 80 MG/1
20 TABLET, FILM COATED ORAL AT BEDTIME
Qty: 0 | Refills: 0 | Status: DISCONTINUED | OUTPATIENT
Start: 2017-03-29 | End: 2017-03-30

## 2017-03-29 RX ORDER — SODIUM CHLORIDE 9 MG/ML
1000 INJECTION, SOLUTION INTRAVENOUS
Qty: 0 | Refills: 0 | Status: DISCONTINUED | OUTPATIENT
Start: 2017-03-29 | End: 2017-03-30

## 2017-03-29 RX ORDER — INSULIN ASPART 100 [IU]/ML
1 INJECTION, SOLUTION SUBCUTANEOUS
Qty: 0 | Refills: 0 | Status: DISCONTINUED | OUTPATIENT
Start: 2017-03-29 | End: 2017-03-30

## 2017-03-29 RX ORDER — INSULIN GLARGINE 100 [IU]/ML
10 INJECTION, SOLUTION SUBCUTANEOUS ONCE
Qty: 0 | Refills: 0 | Status: COMPLETED | OUTPATIENT
Start: 2017-03-29 | End: 2017-03-29

## 2017-03-29 RX ORDER — FUROSEMIDE 40 MG
40 TABLET ORAL
Qty: 0 | Refills: 0 | Status: DISCONTINUED | OUTPATIENT
Start: 2017-03-29 | End: 2017-03-30

## 2017-03-29 RX ORDER — LISINOPRIL 2.5 MG/1
40 TABLET ORAL DAILY
Qty: 0 | Refills: 0 | Status: DISCONTINUED | OUTPATIENT
Start: 2017-03-29 | End: 2017-03-30

## 2017-03-29 RX ORDER — METOPROLOL TARTRATE 50 MG
50 TABLET ORAL DAILY
Qty: 0 | Refills: 0 | Status: DISCONTINUED | OUTPATIENT
Start: 2017-03-29 | End: 2017-03-30

## 2017-03-29 RX ORDER — INSULIN ASPART 100 [IU]/ML
1 INJECTION, SOLUTION SUBCUTANEOUS
Qty: 0 | Refills: 0 | COMMUNITY
Start: 2017-03-29

## 2017-03-29 RX ORDER — DULOXETINE HYDROCHLORIDE 30 MG/1
60 CAPSULE, DELAYED RELEASE ORAL DAILY
Qty: 0 | Refills: 0 | Status: DISCONTINUED | OUTPATIENT
Start: 2017-03-29 | End: 2017-03-30

## 2017-03-29 RX ORDER — CINACALCET 30 MG/1
30 TABLET, FILM COATED ORAL AT BEDTIME
Qty: 0 | Refills: 0 | Status: DISCONTINUED | OUTPATIENT
Start: 2017-03-29 | End: 2017-03-30

## 2017-03-29 RX ORDER — GLUCAGON INJECTION, SOLUTION 0.5 MG/.1ML
1 INJECTION, SOLUTION SUBCUTANEOUS ONCE
Qty: 0 | Refills: 0 | Status: DISCONTINUED | OUTPATIENT
Start: 2017-03-29 | End: 2017-03-30

## 2017-03-29 RX ORDER — SEVELAMER CARBONATE 2400 MG/1
2400 POWDER, FOR SUSPENSION ORAL
Qty: 0 | Refills: 0 | Status: DISCONTINUED | OUTPATIENT
Start: 2017-03-29 | End: 2017-03-30

## 2017-03-29 RX ORDER — ASPIRIN/CALCIUM CARB/MAGNESIUM 324 MG
81 TABLET ORAL DAILY
Qty: 0 | Refills: 0 | Status: DISCONTINUED | OUTPATIENT
Start: 2017-03-29 | End: 2017-03-30

## 2017-03-29 RX ADMIN — ATORVASTATIN CALCIUM 20 MILLIGRAM(S): 80 TABLET, FILM COATED ORAL at 21:12

## 2017-03-29 RX ADMIN — DULOXETINE HYDROCHLORIDE 60 MILLIGRAM(S): 30 CAPSULE, DELAYED RELEASE ORAL at 14:53

## 2017-03-29 RX ADMIN — Medication 100 MILLIGRAM(S): at 14:51

## 2017-03-29 RX ADMIN — CLOPIDOGREL BISULFATE 75 MILLIGRAM(S): 75 TABLET, FILM COATED ORAL at 21:12

## 2017-03-29 RX ADMIN — SEVELAMER CARBONATE 2400 MILLIGRAM(S): 2400 POWDER, FOR SUSPENSION ORAL at 14:51

## 2017-03-29 RX ADMIN — SEVELAMER CARBONATE 2400 MILLIGRAM(S): 2400 POWDER, FOR SUSPENSION ORAL at 17:31

## 2017-03-29 RX ADMIN — Medication 50 MILLIGRAM(S): at 14:53

## 2017-03-29 RX ADMIN — CINACALCET 30 MILLIGRAM(S): 30 TABLET, FILM COATED ORAL at 21:12

## 2017-03-29 RX ADMIN — LISINOPRIL 40 MILLIGRAM(S): 2.5 TABLET ORAL at 14:54

## 2017-03-29 RX ADMIN — Medication 1 TABLET(S): at 14:53

## 2017-03-29 RX ADMIN — INSULIN GLARGINE 10 UNIT(S): 100 INJECTION, SOLUTION SUBCUTANEOUS at 08:53

## 2017-03-29 NOTE — DISCHARGE NOTE ADULT - PLAN OF CARE
Pt remains chest pain free and understands post cath discharge instructions Do not stop you Aspirin or Plavix unless instructed to do so by your cardiologist.   No heavy lifting, strenuous activity, bending, straining, or unnecessary stair climbing for 2 weeks. No driving for 2 days. You may shower 24 hours following the procedure but avoid baths/swimming for 1 week. Check your groin site for bleeding and/or swelling daily following procedure and call your doctor immediately if it occurs or if you experience increased pain at the site. Follow up with your cardiologist in 1-2 weeks. You may call [facility] if you have any questions/concerns regarding your procedure [phone].   Lifestyle modification: include healthy habits to prevent stroke and future CAD  Low salt, low fat diet.   Weight management.   Take medications as prescribed.    No smoking.  Follow up with your cardiologist or primary doctor in 1- 2 weeks. Your hemoglobin A1C will be less than 7 Continue to follow with your primary care MD or your endocrinologist.  Follow a heart healthy diabetic diet. If you check your fingerstick glucose at home, call your MD if it is greater than 250mg/dL on 2 occasions or less than 100mg/dL on 2 occasions. Know signs of low blood sugar, such as: dizziness, shakiness, sweating, confusion, hunger, nervousness-drink 4 ounces apple juice if occurs and call your doctor. Know early signs of high blood sugar, such as: frequent urination, increased thirst, blurry vision, fatigue, headache - call your doctor if this occurs. Follow with other practitioners to care for your diabetes, such as ophthamologist and podiatrist.

## 2017-03-29 NOTE — DISCHARGE NOTE ADULT - ADDITIONAL INSTRUCTIONS
Follow up with cardiology  in 7 to 10 days with cardiology ( dr. Vela). Continue HD as per previous schedule. Follow with endocrinology Dr Pina alex.

## 2017-03-29 NOTE — PATIENT PROFILE ADULT. - PRO INTERPRETER NEED 2
Patient assessed, Vitals stable, fundus firm, bleeding within normal limits, all questions & concerns answered, Pt. Has call light within reach & working.    English

## 2017-03-29 NOTE — H&P CARDIOLOGY - HISTORY OF PRESENT ILLNESS
62 y/o female with pmh of HTN, HLD, DM1 on Medtronic Novolog Insulin Pump c/w DKA (1/2017), CAD with prior MI, s/p stents x 4, NSTEMI 1/2017 s/p PCI/POBA/Atherectomy pRCA 75%, mRCA 95%, ESRD on HD(3-4 times a week last session 3/28/17), HFpEF, PVD s/p bilateral bypass, left subclavian vein stent, CVA x 3 with left sided weakness and short term memory loss, TIA  seen and evaluated by Dr. Quintreos, Cardiologist and presents for planned Brachytherapy for RCA (recurrent ISR) and describes dysnea on exertion needing to walk slow or frequent rest periods (possible anginal equivalent).  Pt denies cp, sob or palpitations at rest.  Endocrine consult called upon arrival with  on arrival.      Endocrinologist - Dr. Ley Inhouse Dr. Jana Matute  Renal - Dr. Diamond  In Hospital - Dr. 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 Quinteros, Cardiologist and presents for planned Brachytherapy for RCA (recurrent ISR) and describes dysnea on exertion needing to walk slow or frequent rest periods (possible anginal equivalent).  Pt denies cp, sob or palpitations at rest.  Endocrine consult called upon arrival with  on arrival.  Pt to receive Lantus 10 units and pump disconnected as d/w Endocrine Resident Dr. Nixon.      OF note Pt being treated with Doxy 100mg bid for 10 days started on 3/24/17 for Right Thumb cellulitis 2/2 cat scratch.  Erythema swelling to thumb and hand.  Pt with unilateral edema to left LE (states chronic)  reports having doppler 3 months ago stating it was negative for DVT.    Endocrinologist - Dr. Ley Inhouse Dr. Jana Matute  Renal - Dr. Diamond  In Hospital - Dr. Burger  HD - [facility] 3-4 times per week last HD 3/2/8/17

## 2017-03-29 NOTE — DISCHARGE NOTE ADULT - MEDICATION SUMMARY - MEDICATIONS TO TAKE
I will START or STAY ON the medications listed below when I get home from the hospital:    Junior Aspirin Regimen 81 mg oral delayed release tablet  -- 1 tab(s) by mouth once a day (at bedtime)  -- Indication: For Cad    lisinopril 40 mg oral tablet  -- 1 tab(s) by mouth once a day MDD:1  -- Indication: For htn    DULoxetine 60 mg oral delayed release capsule  -- 1 cap(s) by mouth once a day (at bedtime)  -- Indication: For home med    insulin aspart 100 units/mL subcutaneous solution  -- 1 each subcutaneous   -- Indication: For dm    atorvastatin 20 mg oral tablet  -- 1 tab(s) by mouth once a day (at bedtime)  -- Indication: For hld    doxycycline hyclate 100 mg oral capsule  -- 1 cap(s) by mouth 2 times a day for 10 days started on 3/24/17   -- Indication: For home med    clopidogrel 75 mg oral tablet  -- 1 tab(s) by mouth once a day (at bedtime)  -- Indication: For Cad    metoprolol succinate 50 mg oral tablet, extended release  -- 1 tab(s) by mouth once a day (at bedtime)  -- Indication: For htn    Lasix 40 mg oral tablet  -- 1 tab(s) by mouth 3 times a week on Sunday, Monday and Friday (at bedtime)  -- Indication: For home med    Sensipar 30 mg oral tablet  -- 1 tab(s) by mouth once a day (at bedtime)  -- Indication: For home med    Renvela 800 mg oral tablet  -- 3 tab(s) by mouth 3 times a day  -- Indication: For home med    multivitamin  -- 1 tab(s) by mouth once a day (at bedtime)  -- Indication: For home med I will START or STAY ON the medications listed below when I get home from the hospital:    Junior Aspirin Regimen 81 mg oral delayed release tablet  -- 1 tab(s) by mouth once a day (at bedtime)  -- Indication: For Cad    lisinopril 40 mg oral tablet  -- 1 tab(s) by mouth once a day MDD:1  -- Indication: For htn    DULoxetine 60 mg oral delayed release capsule  -- 1 cap(s) by mouth once a day (at bedtime)  -- Indication: For home med    atorvastatin 20 mg oral tablet  -- 1 tab(s) by mouth once a day (at bedtime)  -- Indication: For hld    doxycycline hyclate 100 mg oral capsule  -- 1 cap(s) by mouth 2 times a day for 10 days started on 3/24/17   -- Indication: For home med    clopidogrel 75 mg oral tablet  -- 1 tab(s) by mouth once a day (at bedtime)  -- Indication: For Cad    metoprolol succinate 50 mg oral tablet, extended release  -- 1 tab(s) by mouth once a day (at bedtime)  -- Indication: For htn    Lasix 40 mg oral tablet  -- 1 tab(s) by mouth 3 times a week on Sunday, Monday and Friday (at bedtime)  -- Indication: For home med    Sensipar 30 mg oral tablet  -- 1 tab(s) by mouth once a day (at bedtime)  -- Indication: For home med    Renvela 800 mg oral tablet  -- 3 tab(s) by mouth 3 times a day  -- Indication: For home med    multivitamin  -- 1 tab(s) by mouth once a day (at bedtime)  -- Indication: For home med

## 2017-03-29 NOTE — DISCHARGE NOTE ADULT - PATIENT PORTAL LINK FT
“You can access the FollowHealth Patient Portal, offered by [facility], by registering with the following website: [email or web address]”

## 2017-03-29 NOTE — DISCHARGE NOTE ADULT - PROVIDER TOKENS
TOKEN:'[id number]',TOKEN:'[id number]' TOKEN:'[id number]:'[id number]'

## 2017-03-29 NOTE — DISCHARGE NOTE ADULT - HOSPITAL COURSE
60 y/o female with pmh of HTN, HLD, DM1 (AIC 8) on Medtronic Novolog Insulin Pump c/w DKA (1/2017), CAD with prior MI, s/p stents x 4, NSTEMI 1/2017 s/p PCI/POBA/Atherectomy pRCA 75%, mRCA 95%, ESRD with LUE Fistula on HD(3-4 times a week last session 3/28/17), HFpEF EF 55%, PVD s/p bilateral bypass, left subclavian vein stent, CVA x 3 with left sided weakness and short term memory loss, TIA  seen and evaluated by Dr. Quinteros, Cardiologist and presents for planned Brachytherapy for RCA (recurrent ISR) and describes dysnea on exertion needing to walk slow or frequent rest periods (possible anginal equivalent).  Pt denies cp, sob or palpitations at rest.  Endocrine consult called upon arrival with  on arrival.  Pt to receive Lantus 10 units and pump disconnected as d/w Endocrine Resident Dr. Nixon.    OF note Pt being treated with Doxy 100mg bid for 10 days started on 3/24/17 for Right Thumb cellulitis 2/2 cat scratch.  Erythema swelling to thumb and hand.  Pt with unilateral edema to left LE (states chronic)  reports having doppler 3 months ago stating it was negative for DVT.  Endocrinologist - Dr. Ley Inhouse Dr. Jana Matute  Renal - Dr. Diamond  In Hospital - Dr. Burger  HD - [facility] 3-4 times per week last HD 3/2/8/17  Pt s/p Brachytherapy to RCA via R groin. Pt tolerated procedure well with no post complications and hospitalization remained uneventful. Pt is hemodynamically stable and insertion/incision site benign. No c/o chest pain or SOB. Discharge teaching provided to Pt/caretaker and verbalized understanding the instruction. Pt is stable for discharge home as per attending. 60 y/o female with pmh of HTN, HLD, DM1 (AIC 8) on Medtronic Novolog Insulin Pump c/w DKA (1/2017), CAD with prior MI, s/p stents x 4, NSTEMI 1/2017 s/p PCI/POBA/Atherectomy pRCA 75%, mRCA 95%, ESRD with LUE Fistula on HD(3-4 times a week last session 3/28/17), HFpEF EF 55%, PVD s/p bilateral bypass, left subclavian vein stent, CVA x 3 with left sided weakness and short term memory loss, TIA  seen and evaluated by Dr. Quinteros, Cardiologist and presents for planned Brachytherapy for RCA (recurrent ISR) and describes dysnea on exertion needing to walk slow or frequent rest periods (possible anginal equivalent).  Pt denies cp, sob or palpitations at rest.  Endocrine consult called upon arrival with  on arrival.  Pt to receive Lantus 10 units and pump disconnected as d/w Endocrine Resident Dr. Nixon.    OF note Pt being treated with Doxy 100mg bid for 10 days started on 3/24/17 for Right Thumb cellulitis 2/2 cat scratch.  Erythema swelling to thumb and hand.  Pt with unilateral edema to left LE (states chronic)  reports having doppler 3 months ago stating it was negative for DVT.  Endocrinologist - Dr. Ley Inhouse Dr. Jana Matute  Renal - Dr. Diamond  In Hospital - Dr. Burger  HD - [facility] 3-4 times per week last HD 3/2/8/17  Pt s/p Brachytherapy to RCA via R groin. Pt tolerated procedure well with no post complications and hospitalization remained uneventful. Pt is hemodynamically stable and insertion/incision site benign. No c/o chest pain or SOB. Discharge teaching provided to Pt/caretaker and verbalized understanding the instruction. Pt is stable for discharge home as per attending.   3/30: Pt had ultrasound to R/O pseudoaneurysm; negative. Hemodialysis complete. case d/w Dr Miguel, endocrinology. ( Consult 3/29).  Does not need to be seen by endocrine today prior to discharge. RFA site intact. No evidence of bleeding or hematoma. Left AV fistula positive thrill and bruit, .Clinically  stable and cleared for discharge.

## 2017-03-29 NOTE — DISCHARGE NOTE ADULT - CARE PROVIDER_API CALL
Judah Quinteros), Internal MedicineCard Vas Dis  [address] [place] [address]  Phone: [phone]  Fax: [phone]    Mauro Vela), Cardiovascular Disease; Internal Medicine; Interventional Cardiology  [address] [facility] [address] [place] [address]  Phone: [phone]  Fax: [phone] Judah Quinteros), Internal MedicineCard Vas Dis  [address] [place] [address]  Phone: [phone]  Fax: [phone]    Mauro Vela), Cardiovascular Disease; Internal Medicine; Interventional Cardiology  [address] [facility] [address] [facility] [address]  [place] [address]  Phone: [phone]  Fax: [phone]    Pina Ley (SUSAN), EndocrinologyMetabDiabetes  [address] [address] [place] [address]  Phone: [phone]  Fax: [phone]

## 2017-03-29 NOTE — DISCHARGE NOTE ADULT - CARE PLAN
Principal Discharge DX:	Coronary artery disease due to lipid rich plaque  Goal:	Pt remains chest pain free and understands post cath discharge instructions  Instructions for follow-up, activity and diet:	Do not stop you Aspirin or Plavix unless instructed to do so by your cardiologist.   No heavy lifting, strenuous activity, bending, straining, or unnecessary stair climbing for 2 weeks. No driving for 2 days. You may shower 24 hours following the procedure but avoid baths/swimming for 1 week. Check your groin site for bleeding and/or swelling daily following procedure and call your doctor immediately if it occurs or if you experience increased pain at the site. Follow up with your cardiologist in 1-2 weeks. You may call [facility] if you have any questions/concerns regarding your procedure [phone].   Lifestyle modification: include healthy habits to prevent stroke and future CAD  Low salt, low fat diet.   Weight management.   Take medications as prescribed.    No smoking.  Follow up with your cardiologist or primary doctor in 1- 2 weeks.  Secondary Diagnosis:	Uncontrolled type 1 diabetes mellitus with other circulatory complication  Goal:	Your hemoglobin A1C will be less than 7  Instructions for follow-up, activity and diet:	Continue to follow with your primary care MD or your endocrinologist.  Follow a heart healthy diabetic diet. If you check your fingerstick glucose at home, call your MD if it is greater than 250mg/dL on 2 occasions or less than 100mg/dL on 2 occasions. Know signs of low blood sugar, such as: dizziness, shakiness, sweating, confusion, hunger, nervousness-drink 4 ounces apple juice if occurs and call your doctor. Know early signs of high blood sugar, such as: frequent urination, increased thirst, blurry vision, fatigue, headache - call your doctor if this occurs. Follow with other practitioners to care for your diabetes, such as ophthamologist and podiatrist. Principal Discharge DX:	Coronary artery disease due to lipid rich plaque  Goal:	Pt remains chest pain free and understands post cath discharge instructions  Instructions for follow-up, activity and diet:	Do not stop you Aspirin or Plavix unless instructed to do so by your cardiologist.   No heavy lifting, strenuous activity, bending, straining, or unnecessary stair climbing for 2 weeks. No driving for 2 days. You may shower 24 hours following the procedure but avoid baths/swimming for 1 week. Check your groin site for bleeding and/or swelling daily following procedure and call your doctor immediately if it occurs or if you experience increased pain at the site. Follow up with your cardiologist in 1-2 weeks. You may call [facility] if you have any questions/concerns regarding your procedure [phone].   Lifestyle modification: include healthy habits to prevent stroke and future CAD  Low salt, low fat diet.   Weight management.   Take medications as prescribed.    No smoking.  Follow up with your cardiologist or primary doctor in 1- 2 weeks.  Secondary Diagnosis:	Uncontrolled type 1 diabetes mellitus with other circulatory complication  Goal:	Your hemoglobin A1C will be less than 7  Instructions for follow-up, activity and diet:	Continue to follow with your primary care MD or your endocrinologist.  Follow a heart healthy diabetic diet. If you check your fingerstick glucose at home, call your MD if it is greater than 250mg/dL on 2 occasions or less than 100mg/dL on 2 occasions. Know signs of low blood sugar, such as: dizziness, shakiness, sweating, confusion, hunger, nervousness-drink 4 ounces apple juice if occurs and call your doctor. Know early signs of high blood sugar, such as: frequent urination, increased thirst, blurry vision, fatigue, headache - call your doctor if this occurs. Follow with other practitioners to care for your diabetes, such as ophthamologist and podiatrist. Principal Discharge DX:	Coronary artery disease due to lipid rich plaque  Goal:	Pt remains chest pain free and understands post cath discharge instructions  Instructions for follow-up, activity and diet:	Do not stop you Aspirin or Plavix unless instructed to do so by your cardiologist.   No heavy lifting, strenuous activity, bending, straining, or unnecessary stair climbing for 2 weeks. No driving for 2 days. You may shower 24 hours following the procedure but avoid baths/swimming for 1 week. Check your groin site for bleeding and/or swelling daily following procedure and call your doctor immediately if it occurs or if you experience increased pain at the site. Follow up with your cardiologist in 1-2 weeks. You may call [facility] if you have any questions/concerns regarding your procedure [phone].   Lifestyle modification: include healthy habits to prevent stroke and future CAD  Low salt, low fat diet.   Weight management.   Take medications as prescribed.    No smoking.  Follow up with your cardiologist or primary doctor in 1- 2 weeks.  Secondary Diagnosis:	Uncontrolled type 1 diabetes mellitus with other circulatory complication  Goal:	Your hemoglobin A1C will be less than 7  Instructions for follow-up, activity and diet:	Continue to follow with your primary care MD or your endocrinologist.  Follow a heart healthy diabetic diet. If you check your fingerstick glucose at home, call your MD if it is greater than 250mg/dL on 2 occasions or less than 100mg/dL on 2 occasions. Know signs of low blood sugar, such as: dizziness, shakiness, sweating, confusion, hunger, nervousness-drink 4 ounces apple juice if occurs and call your doctor. Know early signs of high blood sugar, such as: frequent urination, increased thirst, blurry vision, fatigue, headache - call your doctor if this occurs. Follow with other practitioners to care for your diabetes, such as ophthamologist and podiatrist. Principal Discharge DX:	Coronary artery disease due to lipid rich plaque  Goal:	Pt remains chest pain free and understands post cath discharge instructions  Instructions for follow-up, activity and diet:	Do not stop you Aspirin or Plavix unless instructed to do so by your cardiologist.   No heavy lifting, strenuous activity, bending, straining, or unnecessary stair climbing for 2 weeks. No driving for 2 days. You may shower 24 hours following the procedure but avoid baths/swimming for 1 week. Check your groin site for bleeding and/or swelling daily following procedure and call your doctor immediately if it occurs or if you experience increased pain at the site. Follow up with your cardiologist in 1-2 weeks. You may call [facility] if you have any questions/concerns regarding your procedure [phone].   Lifestyle modification: include healthy habits to prevent stroke and future CAD  Low salt, low fat diet.   Weight management.   Take medications as prescribed.    No smoking.  Follow up with your cardiologist or primary doctor in 1- 2 weeks.  Secondary Diagnosis:	Uncontrolled type 1 diabetes mellitus with other circulatory complication  Goal:	Your hemoglobin A1C will be less than 7  Instructions for follow-up, activity and diet:	Continue to follow with your primary care MD or your endocrinologist.  Follow a heart healthy diabetic diet. If you check your fingerstick glucose at home, call your MD if it is greater than 250mg/dL on 2 occasions or less than 100mg/dL on 2 occasions. Know signs of low blood sugar, such as: dizziness, shakiness, sweating, confusion, hunger, nervousness-drink 4 ounces apple juice if occurs and call your doctor. Know early signs of high blood sugar, such as: frequent urination, increased thirst, blurry vision, fatigue, headache - call your doctor if this occurs. Follow with other practitioners to care for your diabetes, such as ophthamologist and podiatrist. Principal Discharge DX:	Coronary artery disease due to lipid rich plaque  Goal:	Pt remains chest pain free and understands post cath discharge instructions  Instructions for follow-up, activity and diet:	Do not stop you Aspirin or Plavix unless instructed to do so by your cardiologist.   No heavy lifting, strenuous activity, bending, straining, or unnecessary stair climbing for 2 weeks. No driving for 2 days. You may shower 24 hours following the procedure but avoid baths/swimming for 1 week. Check your groin site for bleeding and/or swelling daily following procedure and call your doctor immediately if it occurs or if you experience increased pain at the site. Follow up with your cardiologist in 1-2 weeks. You may call [facility] if you have any questions/concerns regarding your procedure [phone].   Lifestyle modification: include healthy habits to prevent stroke and future CAD  Low salt, low fat diet.   Weight management.   Take medications as prescribed.    No smoking.  Follow up with your cardiologist or primary doctor in 1- 2 weeks.  Secondary Diagnosis:	Uncontrolled type 1 diabetes mellitus with other circulatory complication  Goal:	Your hemoglobin A1C will be less than 7  Instructions for follow-up, activity and diet:	Continue to follow with your primary care MD or your endocrinologist.  Follow a heart healthy diabetic diet. If you check your fingerstick glucose at home, call your MD if it is greater than 250mg/dL on 2 occasions or less than 100mg/dL on 2 occasions. Know signs of low blood sugar, such as: dizziness, shakiness, sweating, confusion, hunger, nervousness-drink 4 ounces apple juice if occurs and call your doctor. Know early signs of high blood sugar, such as: frequent urination, increased thirst, blurry vision, fatigue, headache - call your doctor if this occurs. Follow with other practitioners to care for your diabetes, such as ophthamologist and podiatrist.

## 2017-03-30 VITALS
HEART RATE: 68 BPM | TEMPERATURE: 98 F | OXYGEN SATURATION: 95 % | RESPIRATION RATE: 17 BRPM | SYSTOLIC BLOOD PRESSURE: 164 MMHG | DIASTOLIC BLOOD PRESSURE: 49 MMHG

## 2017-03-30 LAB
HAV IGM SER-ACNC: SIGNIFICANT CHANGE UP
HBV CORE AB SER-ACNC: SIGNIFICANT CHANGE UP
HBV CORE IGM SER-ACNC: SIGNIFICANT CHANGE UP
HBV SURFACE AB SER-ACNC: <3 MIU/ML — LOW
HBV SURFACE AG SER-ACNC: SIGNIFICANT CHANGE UP
HCV AB S/CO SERPL IA: 0.17 S/CO — SIGNIFICANT CHANGE UP
HCV AB SERPL-IMP: SIGNIFICANT CHANGE UP

## 2017-03-30 PROCEDURE — 86705 HEP B CORE ANTIBODY IGM: CPT

## 2017-03-30 PROCEDURE — 87340 HEPATITIS B SURFACE AG IA: CPT

## 2017-03-30 PROCEDURE — 82550 ASSAY OF CK (CPK): CPT

## 2017-03-30 PROCEDURE — 99261: CPT

## 2017-03-30 PROCEDURE — 93005 ELECTROCARDIOGRAM TRACING: CPT

## 2017-03-30 PROCEDURE — 77370 RADIATION PHYSICS CONSULT: CPT

## 2017-03-30 PROCEDURE — 82435 ASSAY OF BLOOD CHLORIDE: CPT

## 2017-03-30 PROCEDURE — 85014 HEMATOCRIT: CPT

## 2017-03-30 PROCEDURE — C1725: CPT

## 2017-03-30 PROCEDURE — 86704 HEP B CORE ANTIBODY TOTAL: CPT

## 2017-03-30 PROCEDURE — 83605 ASSAY OF LACTIC ACID: CPT

## 2017-03-30 PROCEDURE — 82330 ASSAY OF CALCIUM: CPT

## 2017-03-30 PROCEDURE — 85610 PROTHROMBIN TIME: CPT

## 2017-03-30 PROCEDURE — 92920 PRQ TRLUML C ANGIOP 1ART&/BR: CPT | Mod: RC

## 2017-03-30 PROCEDURE — 83880 ASSAY OF NATRIURETIC PEPTIDE: CPT

## 2017-03-30 PROCEDURE — 80053 COMPREHEN METABOLIC PANEL: CPT

## 2017-03-30 PROCEDURE — 99285 EMERGENCY DEPT VISIT HI MDM: CPT | Mod: 25

## 2017-03-30 PROCEDURE — 71046 X-RAY EXAM CHEST 2 VIEWS: CPT

## 2017-03-30 PROCEDURE — 82803 BLOOD GASES ANY COMBINATION: CPT

## 2017-03-30 PROCEDURE — 82947 ASSAY GLUCOSE BLOOD QUANT: CPT

## 2017-03-30 PROCEDURE — 82553 CREATINE MB FRACTION: CPT

## 2017-03-30 PROCEDURE — 84295 ASSAY OF SERUM SODIUM: CPT

## 2017-03-30 PROCEDURE — C1885: CPT

## 2017-03-30 PROCEDURE — 77770 HDR RDNCL NTRSTL/ICAV BRCHTX: CPT

## 2017-03-30 PROCEDURE — 86803 HEPATITIS C AB TEST: CPT

## 2017-03-30 PROCEDURE — 86706 HEP B SURFACE ANTIBODY: CPT

## 2017-03-30 PROCEDURE — 86709 HEPATITIS A IGM ANTIBODY: CPT

## 2017-03-30 PROCEDURE — 80048 BASIC METABOLIC PNL TOTAL CA: CPT

## 2017-03-30 PROCEDURE — C1769: CPT

## 2017-03-30 PROCEDURE — 84132 ASSAY OF SERUM POTASSIUM: CPT

## 2017-03-30 PROCEDURE — 77470 SPECIAL RADIATION TREATMENT: CPT

## 2017-03-30 PROCEDURE — 85027 COMPLETE CBC AUTOMATED: CPT

## 2017-03-30 PROCEDURE — C1894: CPT

## 2017-03-30 PROCEDURE — C1717: CPT

## 2017-03-30 PROCEDURE — 77290 THER RAD SIMULAJ FIELD CPLX: CPT

## 2017-03-30 PROCEDURE — 93926 LOWER EXTREMITY STUDY: CPT

## 2017-03-30 PROCEDURE — C1887: CPT

## 2017-03-30 PROCEDURE — 84484 ASSAY OF TROPONIN QUANT: CPT

## 2017-03-30 PROCEDURE — 77318 BRACHYTX ISODOSE COMPLEX: CPT

## 2017-03-30 RX ADMIN — LISINOPRIL 40 MILLIGRAM(S): 2.5 TABLET ORAL at 06:44

## 2017-03-30 RX ADMIN — SEVELAMER CARBONATE 2400 MILLIGRAM(S): 2400 POWDER, FOR SUSPENSION ORAL at 07:42

## 2017-03-30 RX ADMIN — Medication 100 MILLIGRAM(S): at 02:25

## 2017-03-30 RX ADMIN — SEVELAMER CARBONATE 2400 MILLIGRAM(S): 2400 POWDER, FOR SUSPENSION ORAL at 14:54

## 2017-03-30 RX ADMIN — DULOXETINE HYDROCHLORIDE 60 MILLIGRAM(S): 30 CAPSULE, DELAYED RELEASE ORAL at 14:55

## 2017-03-30 RX ADMIN — Medication 50 MILLIGRAM(S): at 06:44

## 2017-03-30 RX ADMIN — Medication 100 MILLIGRAM(S): at 14:57

## 2017-03-30 RX ADMIN — Medication 81 MILLIGRAM(S): at 14:54

## 2017-04-10 ENCOUNTER — INPATIENT (INPATIENT)
Facility: HOSPITAL | Age: 62
LOS: 6 days | Discharge: HOME | DRG: 602 | End: 2017-04-17
Attending: INTERNAL MEDICINE | Admitting: INTERNAL MEDICINE
Payer: MEDICARE

## 2017-04-10 VITALS
SYSTOLIC BLOOD PRESSURE: 196 MMHG | HEART RATE: 72 BPM | OXYGEN SATURATION: 98 % | TEMPERATURE: 98 F | DIASTOLIC BLOOD PRESSURE: 70 MMHG | RESPIRATION RATE: 16 BRPM

## 2017-04-10 DIAGNOSIS — I10 ESSENTIAL (PRIMARY) HYPERTENSION: ICD-10-CM

## 2017-04-10 DIAGNOSIS — I25.10 ATHEROSCLEROTIC HEART DISEASE OF NATIVE CORONARY ARTERY WITHOUT ANGINA PECTORIS: ICD-10-CM

## 2017-04-10 DIAGNOSIS — E78.5 HYPERLIPIDEMIA, UNSPECIFIED: ICD-10-CM

## 2017-04-10 DIAGNOSIS — L03.011 CELLULITIS OF RIGHT FINGER: ICD-10-CM

## 2017-04-10 DIAGNOSIS — N18.6 END STAGE RENAL DISEASE: ICD-10-CM

## 2017-04-10 DIAGNOSIS — Z98.89 OTHER SPECIFIED POSTPROCEDURAL STATES: Chronic | ICD-10-CM

## 2017-04-10 DIAGNOSIS — L03.019 CELLULITIS OF UNSPECIFIED FINGER: ICD-10-CM

## 2017-04-10 DIAGNOSIS — E10.9 TYPE 1 DIABETES MELLITUS WITHOUT COMPLICATIONS: ICD-10-CM

## 2017-04-10 DIAGNOSIS — Z29.9 ENCOUNTER FOR PROPHYLACTIC MEASURES, UNSPECIFIED: ICD-10-CM

## 2017-04-10 LAB
ALBUMIN SERPL ELPH-MCNC: 4.1 G/DL — SIGNIFICANT CHANGE UP (ref 3.3–5)
ALP SERPL-CCNC: 260 U/L — HIGH (ref 40–120)
ALT FLD-CCNC: 9 U/L RC — LOW (ref 10–45)
ANION GAP SERPL CALC-SCNC: 20 MMOL/L — HIGH (ref 5–17)
ANION GAP SERPL CALC-SCNC: 23 MMOL/L — HIGH (ref 5–17)
AST SERPL-CCNC: 17 U/L — SIGNIFICANT CHANGE UP (ref 10–40)
BASOPHILS # BLD AUTO: 0 K/UL — SIGNIFICANT CHANGE UP (ref 0–0.2)
BASOPHILS NFR BLD AUTO: 0.6 % — SIGNIFICANT CHANGE UP (ref 0–2)
BILIRUB SERPL-MCNC: 0.3 MG/DL — SIGNIFICANT CHANGE UP (ref 0.2–1.2)
BUN SERPL-MCNC: 55 MG/DL — HIGH (ref 7–23)
BUN SERPL-MCNC: 55 MG/DL — HIGH (ref 7–23)
CALCIUM SERPL-MCNC: 8.7 MG/DL — SIGNIFICANT CHANGE UP (ref 8.4–10.5)
CALCIUM SERPL-MCNC: 8.7 MG/DL — SIGNIFICANT CHANGE UP (ref 8.4–10.5)
CHLORIDE SERPL-SCNC: 88 MMOL/L — LOW (ref 96–108)
CHLORIDE SERPL-SCNC: 89 MMOL/L — LOW (ref 96–108)
CO2 SERPL-SCNC: 23 MMOL/L — SIGNIFICANT CHANGE UP (ref 22–31)
CO2 SERPL-SCNC: 25 MMOL/L — SIGNIFICANT CHANGE UP (ref 22–31)
CREAT SERPL-MCNC: 8.81 MG/DL — HIGH (ref 0.5–1.3)
CREAT SERPL-MCNC: 9.24 MG/DL — HIGH (ref 0.5–1.3)
EOSINOPHIL # BLD AUTO: 0.1 K/UL — SIGNIFICANT CHANGE UP (ref 0–0.5)
EOSINOPHIL NFR BLD AUTO: 1.6 % — SIGNIFICANT CHANGE UP (ref 0–6)
GAS PNL BLDV: SIGNIFICANT CHANGE UP
GLUCOSE SERPL-MCNC: 262 MG/DL — HIGH (ref 70–99)
GLUCOSE SERPL-MCNC: 415 MG/DL — HIGH (ref 70–99)
HCT VFR BLD CALC: 32 % — LOW (ref 34.5–45)
HGB BLD-MCNC: 10.8 G/DL — LOW (ref 11.5–15.5)
LYMPHOCYTES # BLD AUTO: 1.5 K/UL — SIGNIFICANT CHANGE UP (ref 1–3.3)
LYMPHOCYTES # BLD AUTO: 24.1 % — SIGNIFICANT CHANGE UP (ref 13–44)
MCHC RBC-ENTMCNC: 33.1 PG — SIGNIFICANT CHANGE UP (ref 27–34)
MCHC RBC-ENTMCNC: 33.9 GM/DL — SIGNIFICANT CHANGE UP (ref 32–36)
MCV RBC AUTO: 97.6 FL — SIGNIFICANT CHANGE UP (ref 80–100)
MONOCYTES # BLD AUTO: 0.4 K/UL — SIGNIFICANT CHANGE UP (ref 0–0.9)
MONOCYTES NFR BLD AUTO: 6 % — SIGNIFICANT CHANGE UP (ref 2–14)
NEUTROPHILS # BLD AUTO: 4.1 K/UL — SIGNIFICANT CHANGE UP (ref 1.8–7.4)
NEUTROPHILS NFR BLD AUTO: 67.7 % — SIGNIFICANT CHANGE UP (ref 43–77)
PLATELET # BLD AUTO: 251 K/UL — SIGNIFICANT CHANGE UP (ref 150–400)
POTASSIUM SERPL-MCNC: 5.5 MMOL/L — HIGH (ref 3.5–5.3)
POTASSIUM SERPL-MCNC: 6.7 MMOL/L — CRITICAL HIGH (ref 3.5–5.3)
POTASSIUM SERPL-SCNC: 5.5 MMOL/L — HIGH (ref 3.5–5.3)
POTASSIUM SERPL-SCNC: 6.7 MMOL/L — CRITICAL HIGH (ref 3.5–5.3)
PROT SERPL-MCNC: 7.1 G/DL — SIGNIFICANT CHANGE UP (ref 6–8.3)
RBC # BLD: 3.28 M/UL — LOW (ref 3.8–5.2)
RBC # FLD: 12.5 % — SIGNIFICANT CHANGE UP (ref 10.3–14.5)
SODIUM SERPL-SCNC: 133 MMOL/L — LOW (ref 135–145)
SODIUM SERPL-SCNC: 135 MMOL/L — SIGNIFICANT CHANGE UP (ref 135–145)
WBC # BLD: 6.1 K/UL — SIGNIFICANT CHANGE UP (ref 3.8–10.5)
WBC # FLD AUTO: 6.1 K/UL — SIGNIFICANT CHANGE UP (ref 3.8–10.5)

## 2017-04-10 PROCEDURE — 99285 EMERGENCY DEPT VISIT HI MDM: CPT | Mod: 25,GC

## 2017-04-10 PROCEDURE — 93010 ELECTROCARDIOGRAM REPORT: CPT | Mod: GC

## 2017-04-10 PROCEDURE — 99223 1ST HOSP IP/OBS HIGH 75: CPT

## 2017-04-10 RX ORDER — SODIUM CHLORIDE 9 MG/ML
1000 INJECTION, SOLUTION INTRAVENOUS
Qty: 0 | Refills: 0 | Status: DISCONTINUED | OUTPATIENT
Start: 2017-04-10 | End: 2017-04-17

## 2017-04-10 RX ORDER — GLUCAGON INJECTION, SOLUTION 0.5 MG/.1ML
1 INJECTION, SOLUTION SUBCUTANEOUS ONCE
Qty: 0 | Refills: 0 | Status: DISCONTINUED | OUTPATIENT
Start: 2017-04-10 | End: 2017-04-17

## 2017-04-10 RX ORDER — INSULIN ASPART 100 [IU]/ML
1 INJECTION, SOLUTION SUBCUTANEOUS
Qty: 0 | Refills: 0 | Status: DISCONTINUED | OUTPATIENT
Start: 2017-04-10 | End: 2017-04-11

## 2017-04-10 RX ORDER — DULOXETINE HYDROCHLORIDE 30 MG/1
60 CAPSULE, DELAYED RELEASE ORAL AT BEDTIME
Qty: 0 | Refills: 0 | Status: DISCONTINUED | OUTPATIENT
Start: 2017-04-10 | End: 2017-04-17

## 2017-04-10 RX ORDER — FUROSEMIDE 40 MG
40 TABLET ORAL
Qty: 0 | Refills: 0 | Status: DISCONTINUED | OUTPATIENT
Start: 2017-04-10 | End: 2017-04-17

## 2017-04-10 RX ORDER — CINACALCET 30 MG/1
30 TABLET, FILM COATED ORAL AT BEDTIME
Qty: 0 | Refills: 0 | Status: DISCONTINUED | OUTPATIENT
Start: 2017-04-10 | End: 2017-04-17

## 2017-04-10 RX ORDER — LISINOPRIL 2.5 MG/1
40 TABLET ORAL DAILY
Qty: 0 | Refills: 0 | Status: DISCONTINUED | OUTPATIENT
Start: 2017-04-10 | End: 2017-04-17

## 2017-04-10 RX ORDER — VANCOMYCIN HCL 1 G
1000 VIAL (EA) INTRAVENOUS ONCE
Qty: 0 | Refills: 0 | Status: COMPLETED | OUTPATIENT
Start: 2017-04-10 | End: 2017-04-15

## 2017-04-10 RX ORDER — METOPROLOL TARTRATE 50 MG
50 TABLET ORAL AT BEDTIME
Qty: 0 | Refills: 0 | Status: DISCONTINUED | OUTPATIENT
Start: 2017-04-10 | End: 2017-04-17

## 2017-04-10 RX ORDER — DEXTROSE 50 % IN WATER 50 %
1 SYRINGE (ML) INTRAVENOUS ONCE
Qty: 0 | Refills: 0 | Status: DISCONTINUED | OUTPATIENT
Start: 2017-04-10 | End: 2017-04-17

## 2017-04-10 RX ORDER — ASPIRIN/CALCIUM CARB/MAGNESIUM 324 MG
81 TABLET ORAL DAILY
Qty: 0 | Refills: 0 | Status: DISCONTINUED | OUTPATIENT
Start: 2017-04-10 | End: 2017-04-17

## 2017-04-10 RX ORDER — HEPARIN SODIUM 5000 [USP'U]/ML
5000 INJECTION INTRAVENOUS; SUBCUTANEOUS EVERY 8 HOURS
Qty: 0 | Refills: 0 | Status: DISCONTINUED | OUTPATIENT
Start: 2017-04-10 | End: 2017-04-17

## 2017-04-10 RX ORDER — DEXTROSE 50 % IN WATER 50 %
25 SYRINGE (ML) INTRAVENOUS ONCE
Qty: 0 | Refills: 0 | Status: DISCONTINUED | OUTPATIENT
Start: 2017-04-10 | End: 2017-04-17

## 2017-04-10 RX ORDER — SEVELAMER CARBONATE 2400 MG/1
800 POWDER, FOR SUSPENSION ORAL
Qty: 0 | Refills: 0 | Status: DISCONTINUED | OUTPATIENT
Start: 2017-04-10 | End: 2017-04-11

## 2017-04-10 RX ORDER — CLOPIDOGREL BISULFATE 75 MG/1
75 TABLET, FILM COATED ORAL AT BEDTIME
Qty: 0 | Refills: 0 | Status: DISCONTINUED | OUTPATIENT
Start: 2017-04-10 | End: 2017-04-17

## 2017-04-10 RX ORDER — DEXTROSE 50 % IN WATER 50 %
12.5 SYRINGE (ML) INTRAVENOUS ONCE
Qty: 0 | Refills: 0 | Status: DISCONTINUED | OUTPATIENT
Start: 2017-04-10 | End: 2017-04-17

## 2017-04-10 RX ORDER — VANCOMYCIN HCL 1 G
1000 VIAL (EA) INTRAVENOUS ONCE
Qty: 0 | Refills: 0 | Status: COMPLETED | OUTPATIENT
Start: 2017-04-10 | End: 2017-04-10

## 2017-04-10 RX ORDER — ATORVASTATIN CALCIUM 80 MG/1
20 TABLET, FILM COATED ORAL AT BEDTIME
Qty: 0 | Refills: 0 | Status: DISCONTINUED | OUTPATIENT
Start: 2017-04-10 | End: 2017-04-17

## 2017-04-10 RX ADMIN — Medication 250 MILLIGRAM(S): at 18:45

## 2017-04-10 NOTE — ED ADULT NURSE NOTE - OBJECTIVE STATEMENT
Reports right thumb scratched by cat 4 wks ago, seen in ED 2 wks ago, placed on oral antibiotics. Pt reports thumb is getting increasingly larger and painful since. Denies fever, chills, N/V, D, abd. pain, drainage from thumb. Pt has insulin pump, as per patient and spouse, Blood glucose levels have been fluctuating and increasing in levels. Last . Pt is a dialysis pt, last dialysis treatment sat, completed. Fistula to left arm. Pending MD orders. Comfort and safety rounding in place.

## 2017-04-10 NOTE — ED ADULT NURSE REASSESSMENT NOTE - NS ED NURSE REASSESS COMMENT FT1
Patient return from Dialysis at 23:45 Patient return from Dialysis at 23:45. Insulin pump is noted in LLQ of abdomen. Patient return from Dialysis around 00:00. Dialysis RN states patient received 3.5 hours of dialysis. BMP was sent. VSS. Insulin pump is noted in LLQ of abdomen.

## 2017-04-10 NOTE — H&P ADULT. - PROBLEM SELECTOR PLAN 6
Renal recs reviewed and patient currently undergoing HD  C/W Sensipar  Trend BMP to monitor K and Cr.

## 2017-04-10 NOTE — H&P ADULT. - LAB RESULTS AND INTERPRETATION
Peronsonally reviewed Personally reviewed: no leukocytosis. Initial BMP notable for Serum glucose of 415 and K 6.7 Repeat BMP glucose 262 and K 5.5 Lactate 1.3

## 2017-04-10 NOTE — ED ADULT NURSE REASSESSMENT NOTE - NS ED NURSE REASSESS COMMENT FT1
At 20:40, received call from Endocrine fellow Nga Weber (pager #[phone]) regarding diabetes consult order. She stated she needed the NP covering the patient to order the consult. April Lcok NP contacted regarding this issue. NP states she will contact Tia.

## 2017-04-10 NOTE — H&P ADULT. - PROBLEM SELECTOR PLAN 2
Per patient on Insulin pump.  Elevated glucose of 415 with downtrend to 200s. AG in 20s. Check serum acetone, yet patient without symptoms of abd pain, nausea, emesis.   Endo consulted for recommendations of pump  Monitor FS  Hypoglycemia protocol

## 2017-04-10 NOTE — H&P ADULT. - ATTENDING COMMENTS
Dr. Pierce Viera accepted patient's case from ED and requested hospitalist team to complete admission. Patient previously unknown to me and I was assigned case. Dr. Viera to continue care. Sign out provided to night NP.

## 2017-04-10 NOTE — H&P ADULT. - ASSESSMENT
61F with PMhx CAD, MI, s/p stents, HTN, HLD, ESRD on HD(4 times a week), ? dCHF, PVD s/p bilateral bypass, left subclavian vein stent, DM I on insulin pump, DMA, CVA, TIA, recent admission in 1/2017 for DKA, ACS s/p cardiac cath with PTCA/atherectomy

## 2017-04-10 NOTE — ED ADULT NURSE REASSESSMENT NOTE - NS ED NURSE REASSESS COMMENT FT1
Diabetes Inpatient Team called 20:05 at [phone]. Spoke to Cristiane Operater #[id number]. Made  aware patient has insulin pump. Patient currently at dialysis. Self Assessment and Attestation empty forms placed in chart. Patient will be instructed to fill out self assessment when returning from dialysis.

## 2017-04-10 NOTE — H&P ADULT. - PROBLEM SELECTOR PLAN 1
Patient with worsening cellulitis of Right thumb on hand failing outpatient antibiotics.  No appreciable leukocytosis, tachycardia.  Blood cultures collected  Vancomycin IV and monitor by level.  Per ED. Plastic/Hand Dr. Bhatt consulted: awaiting recommendations  ID consult  F/U Xray of R hand Patient with worsening cellulitis of Right thumb on hand failing outpatient antibiotics.  No appreciable leukocytosis, tachycardia.  Blood cultures collected  Vancomycin IV and monitor by level.  Per ED. Plastic/Hand Dr. Bhatt consulted: awaiting recommendations  ID consult  F/U Xray of R hand  Pain control, prn. ISTOP #: [id number]  Last prescribe percocet 5/325 in Early March

## 2017-04-10 NOTE — ED PROVIDER NOTE - OBJECTIVE STATEMENT
Private Physician Gabriela (Boody) Mauro Vela, Cards, Daisy Webb Renal [phone]  61y female pmh DM, CAD, PVD, Sp rt to stents in heart  approx 9d ago [facility],  CKD (Tu/Th/Sa), cva, cellulitis, pvd SP fempop bypass. complains of infection to rt thumb for past mo after scratched by cat, was treated with abx last 1w ago. Now comes to ed complains of worsening infection to rt thumb seen by Dr Morillo and  referred to ed. Also glu was 425, No fever chills .

## 2017-04-10 NOTE — ED PROVIDER NOTE - PROGRESS NOTE DETAILS
Dw Dr Schmidt tba Dr Ruthann Bartholomew MD, Facep Called hand hand, left message with call service.

## 2017-04-10 NOTE — H&P ADULT. - RS GEN PE MLT RESP DETAILS PC
airway patent/respirations non-labored/good air movement/clear to auscultation bilaterally/breath sounds equal

## 2017-04-10 NOTE — H&P ADULT. - HISTORY OF PRESENT ILLNESS
62 yo woman with PMH of  HTN, HLD, DM1 (AIC 8) on Medtronic Novolog Insulin Pump, CAD with prior MI, s/p stents x 4, NSTEMI 1/2017 s/p PCI/POBA/Atherectomy pRCA 75%, mRCA 95%, ESRD with LUE Fistula on HD(Tues/Thurs/Sat.- last session on 4/8), HFpEF EF 55%, PVD s/p bilateral bypass, left subclavian vein stent, CVA x 3 with left sided weakness and short term memory loss, TIA  with recent hospitalization for planned Brachytherapy for RCA on 3/29-3/30 presenting with right thumb pain, swelling and redness in the past 2-3 weeks. Patient states that she was scratched by her cat while giving it a bath. She was given antibiotics 2 days after incident (per patient initiated in hospitalization) Per discharge med rec, patient was  started on Doxycycline on 3/24 (and continued during hospital stay)  for anticipated1 0 day course. Patient states that there was some improvement, but worsening gain. Thumb ROM is limited 2/2 to pain. Patient has been taken ?Percocet for pain.  Patient denies fevers, chills, sweats, abdominal pain, nausea / emesis, diarrhea, constipation URI symptom, cough, dysuria,.

## 2017-04-11 LAB
ACETONE SERPL-MCNC: NEGATIVE — SIGNIFICANT CHANGE UP
ANION GAP SERPL CALC-SCNC: 16 MMOL/L — SIGNIFICANT CHANGE UP (ref 5–17)
ANION GAP SERPL CALC-SCNC: 18 MMOL/L — HIGH (ref 5–17)
BASOPHILS # BLD AUTO: 0.03 K/UL — SIGNIFICANT CHANGE UP (ref 0–0.2)
BASOPHILS NFR BLD AUTO: 0.6 % — SIGNIFICANT CHANGE UP (ref 0–2)
BUN SERPL-MCNC: 15 MG/DL — SIGNIFICANT CHANGE UP (ref 7–23)
BUN SERPL-MCNC: 17 MG/DL — SIGNIFICANT CHANGE UP (ref 7–23)
CALCIUM SERPL-MCNC: 9.1 MG/DL — SIGNIFICANT CHANGE UP (ref 8.4–10.5)
CALCIUM SERPL-MCNC: 9.6 MG/DL — SIGNIFICANT CHANGE UP (ref 8.4–10.5)
CHLORIDE SERPL-SCNC: 93 MMOL/L — LOW (ref 96–108)
CHLORIDE SERPL-SCNC: 96 MMOL/L — SIGNIFICANT CHANGE UP (ref 96–108)
CO2 SERPL-SCNC: 26 MMOL/L — SIGNIFICANT CHANGE UP (ref 22–31)
CO2 SERPL-SCNC: 27 MMOL/L — SIGNIFICANT CHANGE UP (ref 22–31)
CREAT SERPL-MCNC: 3.94 MG/DL — HIGH (ref 0.5–1.3)
CREAT SERPL-MCNC: 4.36 MG/DL — HIGH (ref 0.5–1.3)
EOSINOPHIL # BLD AUTO: 0.14 K/UL — SIGNIFICANT CHANGE UP (ref 0–0.5)
EOSINOPHIL NFR BLD AUTO: 2.8 % — SIGNIFICANT CHANGE UP (ref 0–6)
GLUCOSE SERPL-MCNC: 130 MG/DL — HIGH (ref 70–99)
GLUCOSE SERPL-MCNC: 150 MG/DL — HIGH (ref 70–99)
HAV IGM SER-ACNC: SIGNIFICANT CHANGE UP
HBA1C BLD-MCNC: 8.5 % — HIGH (ref 4–5.6)
HBV CORE AB SER-ACNC: SIGNIFICANT CHANGE UP
HBV CORE IGM SER-ACNC: SIGNIFICANT CHANGE UP
HBV SURFACE AB SER-ACNC: <3 MIU/ML — LOW
HBV SURFACE AG SER-ACNC: SIGNIFICANT CHANGE UP
HCT VFR BLD CALC: 37 % — SIGNIFICANT CHANGE UP (ref 34.5–45)
HCV AB S/CO SERPL IA: 0.23 S/CO — SIGNIFICANT CHANGE UP
HCV AB SERPL-IMP: SIGNIFICANT CHANGE UP
HGB BLD-MCNC: 11.7 G/DL — SIGNIFICANT CHANGE UP (ref 11.5–15.5)
IMM GRANULOCYTES NFR BLD AUTO: 0 % — SIGNIFICANT CHANGE UP (ref 0–1.5)
LYMPHOCYTES # BLD AUTO: 0.96 K/UL — LOW (ref 1–3.3)
LYMPHOCYTES # BLD AUTO: 19.1 % — SIGNIFICANT CHANGE UP (ref 13–44)
MAGNESIUM SERPL-MCNC: 2.2 MG/DL — SIGNIFICANT CHANGE UP (ref 1.6–2.6)
MCHC RBC-ENTMCNC: 31.3 PG — SIGNIFICANT CHANGE UP (ref 27–34)
MCHC RBC-ENTMCNC: 31.6 GM/DL — LOW (ref 32–36)
MCV RBC AUTO: 98.9 FL — SIGNIFICANT CHANGE UP (ref 80–100)
MONOCYTES # BLD AUTO: 0.45 K/UL — SIGNIFICANT CHANGE UP (ref 0–0.9)
MONOCYTES NFR BLD AUTO: 9 % — SIGNIFICANT CHANGE UP (ref 2–14)
NEUTROPHILS # BLD AUTO: 3.44 K/UL — SIGNIFICANT CHANGE UP (ref 1.8–7.4)
NEUTROPHILS NFR BLD AUTO: 68.5 % — SIGNIFICANT CHANGE UP (ref 43–77)
PHOSPHATE SERPL-MCNC: 4.1 MG/DL — SIGNIFICANT CHANGE UP (ref 2.5–4.5)
PLATELET # BLD AUTO: 285 K/UL — SIGNIFICANT CHANGE UP (ref 150–400)
POTASSIUM SERPL-MCNC: 4.5 MMOL/L — SIGNIFICANT CHANGE UP (ref 3.5–5.3)
POTASSIUM SERPL-MCNC: 5 MMOL/L — SIGNIFICANT CHANGE UP (ref 3.5–5.3)
POTASSIUM SERPL-SCNC: 4.5 MMOL/L — SIGNIFICANT CHANGE UP (ref 3.5–5.3)
POTASSIUM SERPL-SCNC: 5 MMOL/L — SIGNIFICANT CHANGE UP (ref 3.5–5.3)
RBC # BLD: 3.74 M/UL — LOW (ref 3.8–5.2)
RBC # FLD: 13.4 % — SIGNIFICANT CHANGE UP (ref 10.3–14.5)
SODIUM SERPL-SCNC: 136 MMOL/L — SIGNIFICANT CHANGE UP (ref 135–145)
SODIUM SERPL-SCNC: 140 MMOL/L — SIGNIFICANT CHANGE UP (ref 135–145)
VANCOMYCIN TROUGH SERPL-MCNC: 15.4 UG/ML — SIGNIFICANT CHANGE UP (ref 10–20)
WBC # BLD: 5.02 K/UL — SIGNIFICANT CHANGE UP (ref 3.8–10.5)
WBC # FLD AUTO: 5.02 K/UL — SIGNIFICANT CHANGE UP (ref 3.8–10.5)

## 2017-04-11 PROCEDURE — 99223 1ST HOSP IP/OBS HIGH 75: CPT

## 2017-04-11 RX ORDER — MORPHINE SULFATE 50 MG/1
1 CAPSULE, EXTENDED RELEASE ORAL ONCE
Qty: 0 | Refills: 0 | Status: DISCONTINUED | OUTPATIENT
Start: 2017-04-11 | End: 2017-04-11

## 2017-04-11 RX ORDER — MORPHINE SULFATE 50 MG/1
2 CAPSULE, EXTENDED RELEASE ORAL ONCE
Qty: 0 | Refills: 0 | Status: DISCONTINUED | OUTPATIENT
Start: 2017-04-11 | End: 2017-04-11

## 2017-04-11 RX ORDER — INSULIN LISPRO 100/ML
1 VIAL (ML) SUBCUTANEOUS
Qty: 0 | Refills: 0 | Status: DISCONTINUED | OUTPATIENT
Start: 2017-04-11 | End: 2017-04-12

## 2017-04-11 RX ORDER — SEVELAMER CARBONATE 2400 MG/1
2400 POWDER, FOR SUSPENSION ORAL
Qty: 0 | Refills: 0 | Status: DISCONTINUED | OUTPATIENT
Start: 2017-04-11 | End: 2017-04-17

## 2017-04-11 RX ORDER — AMPICILLIN SODIUM AND SULBACTAM SODIUM 250; 125 MG/ML; MG/ML
3 INJECTION, POWDER, FOR SUSPENSION INTRAMUSCULAR; INTRAVENOUS EVERY 24 HOURS
Qty: 0 | Refills: 0 | Status: DISCONTINUED | OUTPATIENT
Start: 2017-04-11 | End: 2017-04-17

## 2017-04-11 RX ADMIN — MORPHINE SULFATE 1 MILLIGRAM(S): 50 CAPSULE, EXTENDED RELEASE ORAL at 05:46

## 2017-04-11 RX ADMIN — SEVELAMER CARBONATE 800 MILLIGRAM(S): 2400 POWDER, FOR SUSPENSION ORAL at 13:01

## 2017-04-11 RX ADMIN — LISINOPRIL 40 MILLIGRAM(S): 2.5 TABLET ORAL at 05:05

## 2017-04-11 RX ADMIN — Medication 81 MILLIGRAM(S): at 13:01

## 2017-04-11 RX ADMIN — Medication 50 MILLIGRAM(S): at 21:30

## 2017-04-11 RX ADMIN — ATORVASTATIN CALCIUM 20 MILLIGRAM(S): 80 TABLET, FILM COATED ORAL at 01:52

## 2017-04-11 RX ADMIN — CINACALCET 30 MILLIGRAM(S): 30 TABLET, FILM COATED ORAL at 21:31

## 2017-04-11 RX ADMIN — MORPHINE SULFATE 2 MILLIGRAM(S): 50 CAPSULE, EXTENDED RELEASE ORAL at 22:57

## 2017-04-11 RX ADMIN — MORPHINE SULFATE 1 MILLIGRAM(S): 50 CAPSULE, EXTENDED RELEASE ORAL at 02:27

## 2017-04-11 RX ADMIN — SEVELAMER CARBONATE 2400 MILLIGRAM(S): 2400 POWDER, FOR SUSPENSION ORAL at 17:29

## 2017-04-11 RX ADMIN — CLOPIDOGREL BISULFATE 75 MILLIGRAM(S): 75 TABLET, FILM COATED ORAL at 01:52

## 2017-04-11 RX ADMIN — MORPHINE SULFATE 1 MILLIGRAM(S): 50 CAPSULE, EXTENDED RELEASE ORAL at 06:01

## 2017-04-11 RX ADMIN — HEPARIN SODIUM 5000 UNIT(S): 5000 INJECTION INTRAVENOUS; SUBCUTANEOUS at 09:56

## 2017-04-11 RX ADMIN — CINACALCET 30 MILLIGRAM(S): 30 TABLET, FILM COATED ORAL at 01:51

## 2017-04-11 RX ADMIN — MORPHINE SULFATE 1 MILLIGRAM(S): 50 CAPSULE, EXTENDED RELEASE ORAL at 02:12

## 2017-04-11 RX ADMIN — HEPARIN SODIUM 5000 UNIT(S): 5000 INJECTION INTRAVENOUS; SUBCUTANEOUS at 17:28

## 2017-04-11 RX ADMIN — DULOXETINE HYDROCHLORIDE 60 MILLIGRAM(S): 30 CAPSULE, DELAYED RELEASE ORAL at 21:31

## 2017-04-11 RX ADMIN — Medication 50 MILLIGRAM(S): at 01:52

## 2017-04-11 RX ADMIN — CLOPIDOGREL BISULFATE 75 MILLIGRAM(S): 75 TABLET, FILM COATED ORAL at 21:30

## 2017-04-11 RX ADMIN — HEPARIN SODIUM 5000 UNIT(S): 5000 INJECTION INTRAVENOUS; SUBCUTANEOUS at 01:52

## 2017-04-11 RX ADMIN — AMPICILLIN SODIUM AND SULBACTAM SODIUM 200 GRAM(S): 250; 125 INJECTION, POWDER, FOR SUSPENSION INTRAMUSCULAR; INTRAVENOUS at 16:09

## 2017-04-11 RX ADMIN — ATORVASTATIN CALCIUM 20 MILLIGRAM(S): 80 TABLET, FILM COATED ORAL at 21:31

## 2017-04-11 RX ADMIN — DULOXETINE HYDROCHLORIDE 60 MILLIGRAM(S): 30 CAPSULE, DELAYED RELEASE ORAL at 01:52

## 2017-04-11 RX ADMIN — MORPHINE SULFATE 2 MILLIGRAM(S): 50 CAPSULE, EXTENDED RELEASE ORAL at 23:13

## 2017-04-11 NOTE — ED ADULT NURSE REASSESSMENT NOTE - NS ED NURSE REASSESS COMMENT FT1
Report given to Opal CARMONA on 7 Tow. RN made aware patient recently returned from Dialysis and did not yet receive admitting meds due for 22:00. Just received meds from pharmacy. Will send up with patient. Report given to Opal CARMONA on 7 Tow. RN made aware patient recently returned from Dialysis and did not yet receive admitting meds due for 22:00. Just received meds from pharmacy. Will send up with patient. Patient was given self assessment, filled out some of it but states  helps her so she was unable to fill it all out. Will place in chart. RN notified.

## 2017-04-12 LAB — VANCOMYCIN TROUGH SERPL-MCNC: 11.6 UG/ML — SIGNIFICANT CHANGE UP (ref 10–20)

## 2017-04-12 PROCEDURE — 99233 SBSQ HOSP IP/OBS HIGH 50: CPT

## 2017-04-12 RX ORDER — VANCOMYCIN HCL 1 G
1000 VIAL (EA) INTRAVENOUS ONCE
Qty: 0 | Refills: 0 | Status: COMPLETED | OUTPATIENT
Start: 2017-04-12 | End: 2017-04-13

## 2017-04-12 RX ORDER — INSULIN LISPRO 100/ML
1 VIAL (ML) SUBCUTANEOUS
Qty: 0 | Refills: 0 | Status: DISCONTINUED | OUTPATIENT
Start: 2017-04-12 | End: 2017-04-17

## 2017-04-12 RX ADMIN — HEPARIN SODIUM 5000 UNIT(S): 5000 INJECTION INTRAVENOUS; SUBCUTANEOUS at 22:21

## 2017-04-12 RX ADMIN — HEPARIN SODIUM 5000 UNIT(S): 5000 INJECTION INTRAVENOUS; SUBCUTANEOUS at 00:43

## 2017-04-12 RX ADMIN — CLOPIDOGREL BISULFATE 75 MILLIGRAM(S): 75 TABLET, FILM COATED ORAL at 22:22

## 2017-04-12 RX ADMIN — LISINOPRIL 40 MILLIGRAM(S): 2.5 TABLET ORAL at 05:22

## 2017-04-12 RX ADMIN — SEVELAMER CARBONATE 2400 MILLIGRAM(S): 2400 POWDER, FOR SUSPENSION ORAL at 12:23

## 2017-04-12 RX ADMIN — AMPICILLIN SODIUM AND SULBACTAM SODIUM 200 GRAM(S): 250; 125 INJECTION, POWDER, FOR SUSPENSION INTRAMUSCULAR; INTRAVENOUS at 22:22

## 2017-04-12 RX ADMIN — Medication 81 MILLIGRAM(S): at 12:23

## 2017-04-12 RX ADMIN — Medication 50 MILLIGRAM(S): at 22:21

## 2017-04-12 RX ADMIN — DULOXETINE HYDROCHLORIDE 60 MILLIGRAM(S): 30 CAPSULE, DELAYED RELEASE ORAL at 22:21

## 2017-04-12 RX ADMIN — ATORVASTATIN CALCIUM 20 MILLIGRAM(S): 80 TABLET, FILM COATED ORAL at 22:22

## 2017-04-12 RX ADMIN — SEVELAMER CARBONATE 2400 MILLIGRAM(S): 2400 POWDER, FOR SUSPENSION ORAL at 22:21

## 2017-04-12 RX ADMIN — CINACALCET 30 MILLIGRAM(S): 30 TABLET, FILM COATED ORAL at 22:22

## 2017-04-12 RX ADMIN — HEPARIN SODIUM 5000 UNIT(S): 5000 INJECTION INTRAVENOUS; SUBCUTANEOUS at 10:44

## 2017-04-12 RX ADMIN — SEVELAMER CARBONATE 2400 MILLIGRAM(S): 2400 POWDER, FOR SUSPENSION ORAL at 09:44

## 2017-04-12 NOTE — DIETITIAN INITIAL EVALUATION ADULT. - PROBLEM SELECTOR PLAN 1
Patient with worsening cellulitis of Right thumb on hand failing outpatient antibiotics.  No appreciable leukocytosis, tachycardia.  Blood cultures collected  Vancomycin IV and monitor by level.  Per ED. Plastic/Hand Dr. Bhatt consulted: awaiting recommendations  ID consult  F/U Xray of R hand  Pain control, prn. ISTOP #: [id number]  Last prescribe percocet 5/325 in Early March

## 2017-04-12 NOTE — DIETITIAN INITIAL EVALUATION ADULT. - ADHERENCE
Pt reports strictly following diet for HD and reports she has an insulin pump to manage her blood sugar. Pt states she is not a allowed to have potatoes, vegetables and potatoes and follows a 1 liter fluid restriction. Typical intake: low sodium pacheco and eggs and farina for breakfast; low sodium ham or turkey or tuna sandwich for lunch; meat (mostly chicken) and broccoli sometimes. RD attempted to review recommendation and briefly encouraged consistent carbohydrate intake and limiting processed foods before pt declined further verbal education and written materials. Pt reports checking her blood sugar 5x/day and reports it is typically in the 150-200+mg/dL range, pt reports every so often (~1 time per month) she has hypoglycemia and treats it with apple juice.

## 2017-04-12 NOTE — DIETITIAN INITIAL EVALUATION ADULT. - DIET TYPE
DASH/TLC (sodium and cholesterol restricted diet)/consistent carbohydrate (evening snack)/renal replacement pts:no protein restr,no conc K & phos, low sodium

## 2017-04-12 NOTE — DIETITIAN INITIAL EVALUATION ADULT. - OTHER INFO
Nutrition consult for HD, pt declined in-depth verbal and written materials. Pt reports her dry wt at HD is 60-62kg (132-136 pounds) and states her wt has been around that range and her most recent wt was 131 pounds. Pt reports good po intake. No GI distress at this time. Last bowel movement was last night. Noted pt with poor dentition but pt denies chewing and swallowing difficulties at this time. CHADD

## 2017-04-12 NOTE — DIETITIAN INITIAL EVALUATION ADULT. - NS AS NUTRI INTERV ED CONTENT
RD attempted to review recommendation and briefly encouraged consistent carbohydrate intake, hypoglycemia treatment and limiting processed foods before pt declined further verbal education and written materials. RD to remain available for further nutritional interventions as indicated.

## 2017-04-12 NOTE — DIETITIAN INITIAL EVALUATION ADULT. - ENERGY NEEDS
Ht: “, Wt: lbs, BMI: kg/m2, IBW: lbs (+/-10%), %IBW:  Pertinent Information: Pt admitted with   edema, skin Ht: 64“-stated, Wt: 131 lbs-stated, BMI: 22.5 kg/m2, IBW: lbs (+/-10%), %IBW: 109%  Pertinent Information: Pt presents with Right thumb pain after cat scratch, pt with infection/cellulitis seen by hand surgeon- no surgical intervention. PMH of  HTN, HLD, DM1 (AIC 8) on Medtronic Novolog Insulin Pump, CAD with prior MI, s/p stents x 4, NSTEMI 1/2017 s/p PCI/POBA/Atherectomy pRCA 75%, mRCA 95%, ESRD with LUE Fistula on HD(Tues/Thurs/Sat.- last session on 4/8), HFpEF EF 55%, PVD s/p bilateral bypass, left subclavian vein stent, CVA x 3 with left sided weakness and short term memory loss, TIA  with recent hospitalization for planned Brachytherapy for RCA on 3/29-3/30   2+ Right thumb edema, no pressure injury

## 2017-04-13 ENCOUNTER — APPOINTMENT (OUTPATIENT)
Dept: PAIN MANAGEMENT | Facility: CLINIC | Age: 62
End: 2017-04-13

## 2017-04-13 LAB
ANION GAP SERPL CALC-SCNC: 16 MMOL/L — SIGNIFICANT CHANGE UP (ref 5–17)
BUN SERPL-MCNC: 19 MG/DL — SIGNIFICANT CHANGE UP (ref 7–23)
CALCIUM SERPL-MCNC: 9 MG/DL — SIGNIFICANT CHANGE UP (ref 8.4–10.5)
CHLORIDE SERPL-SCNC: 95 MMOL/L — LOW (ref 96–108)
CO2 SERPL-SCNC: 24 MMOL/L — SIGNIFICANT CHANGE UP (ref 22–31)
CREAT SERPL-MCNC: 4.25 MG/DL — HIGH (ref 0.5–1.3)
GLUCOSE SERPL-MCNC: 228 MG/DL — HIGH (ref 70–99)
POTASSIUM SERPL-MCNC: 4.9 MMOL/L — SIGNIFICANT CHANGE UP (ref 3.5–5.3)
POTASSIUM SERPL-SCNC: 4.9 MMOL/L — SIGNIFICANT CHANGE UP (ref 3.5–5.3)
SODIUM SERPL-SCNC: 135 MMOL/L — SIGNIFICANT CHANGE UP (ref 135–145)

## 2017-04-13 PROCEDURE — 99232 SBSQ HOSP IP/OBS MODERATE 35: CPT

## 2017-04-13 PROCEDURE — 71020: CPT | Mod: 26

## 2017-04-13 PROCEDURE — 73140 X-RAY EXAM OF FINGER(S): CPT | Mod: 26,59,RT

## 2017-04-13 PROCEDURE — 73120 X-RAY EXAM OF HAND: CPT | Mod: 26,RT

## 2017-04-13 PROCEDURE — 73218 MRI UPPER EXTREMITY W/O DYE: CPT | Mod: 26,RT

## 2017-04-13 RX ORDER — ONDANSETRON 8 MG/1
4 TABLET, FILM COATED ORAL ONCE
Qty: 0 | Refills: 0 | Status: COMPLETED | OUTPATIENT
Start: 2017-04-13 | End: 2017-04-13

## 2017-04-13 RX ORDER — METOCLOPRAMIDE HCL 10 MG
10 TABLET ORAL ONCE
Qty: 0 | Refills: 0 | Status: COMPLETED | OUTPATIENT
Start: 2017-04-13 | End: 2017-04-13

## 2017-04-13 RX ADMIN — CLOPIDOGREL BISULFATE 75 MILLIGRAM(S): 75 TABLET, FILM COATED ORAL at 22:13

## 2017-04-13 RX ADMIN — CINACALCET 30 MILLIGRAM(S): 30 TABLET, FILM COATED ORAL at 22:13

## 2017-04-13 RX ADMIN — Medication 10 MILLIGRAM(S): at 23:24

## 2017-04-13 RX ADMIN — DULOXETINE HYDROCHLORIDE 60 MILLIGRAM(S): 30 CAPSULE, DELAYED RELEASE ORAL at 22:13

## 2017-04-13 RX ADMIN — SEVELAMER CARBONATE 2400 MILLIGRAM(S): 2400 POWDER, FOR SUSPENSION ORAL at 13:05

## 2017-04-13 RX ADMIN — AMPICILLIN SODIUM AND SULBACTAM SODIUM 200 GRAM(S): 250; 125 INJECTION, POWDER, FOR SUSPENSION INTRAMUSCULAR; INTRAVENOUS at 22:15

## 2017-04-13 RX ADMIN — SEVELAMER CARBONATE 2400 MILLIGRAM(S): 2400 POWDER, FOR SUSPENSION ORAL at 08:34

## 2017-04-13 RX ADMIN — HEPARIN SODIUM 5000 UNIT(S): 5000 INJECTION INTRAVENOUS; SUBCUTANEOUS at 13:05

## 2017-04-13 RX ADMIN — ATORVASTATIN CALCIUM 20 MILLIGRAM(S): 80 TABLET, FILM COATED ORAL at 22:14

## 2017-04-13 RX ADMIN — ONDANSETRON 4 MILLIGRAM(S): 8 TABLET, FILM COATED ORAL at 16:47

## 2017-04-13 RX ADMIN — Medication 250 MILLIGRAM(S): at 01:12

## 2017-04-13 RX ADMIN — HEPARIN SODIUM 5000 UNIT(S): 5000 INJECTION INTRAVENOUS; SUBCUTANEOUS at 22:13

## 2017-04-13 RX ADMIN — Medication 50 MILLIGRAM(S): at 22:13

## 2017-04-13 RX ADMIN — LISINOPRIL 40 MILLIGRAM(S): 2.5 TABLET ORAL at 06:14

## 2017-04-13 RX ADMIN — Medication 81 MILLIGRAM(S): at 13:05

## 2017-04-13 RX ADMIN — HEPARIN SODIUM 5000 UNIT(S): 5000 INJECTION INTRAVENOUS; SUBCUTANEOUS at 06:14

## 2017-04-13 NOTE — PROVIDER CONTACT NOTE (OTHER) - ASSESSMENT
Pt given all PM oral meds & was not ready to eat @ time of med administration. Pt was seen throwing up dark green emesis w/food contents. Pt hx of DM type 1 & on insulin pump. BS checked & was 360. Pt give herself bolus of 4 units & did not eat.

## 2017-04-14 LAB
ANION GAP SERPL CALC-SCNC: 22 MMOL/L — HIGH (ref 5–17)
BUN SERPL-MCNC: 41 MG/DL — HIGH (ref 7–23)
CALCIUM SERPL-MCNC: 8.4 MG/DL — SIGNIFICANT CHANGE UP (ref 8.4–10.5)
CHLORIDE SERPL-SCNC: 91 MMOL/L — LOW (ref 96–108)
CO2 SERPL-SCNC: 23 MMOL/L — SIGNIFICANT CHANGE UP (ref 22–31)
CREAT SERPL-MCNC: 7.1 MG/DL — HIGH (ref 0.5–1.3)
GLUCOSE SERPL-MCNC: 294 MG/DL — HIGH (ref 70–99)
HCT VFR BLD CALC: 35.8 % — SIGNIFICANT CHANGE UP (ref 34.5–45)
HGB BLD-MCNC: 11.5 G/DL — SIGNIFICANT CHANGE UP (ref 11.5–15.5)
MCHC RBC-ENTMCNC: 32.2 GM/DL — SIGNIFICANT CHANGE UP (ref 32–36)
MCHC RBC-ENTMCNC: 32.2 PG — SIGNIFICANT CHANGE UP (ref 27–34)
MCV RBC AUTO: 99.9 FL — SIGNIFICANT CHANGE UP (ref 80–100)
PLATELET # BLD AUTO: 223 K/UL — SIGNIFICANT CHANGE UP (ref 150–400)
POTASSIUM SERPL-MCNC: 5.1 MMOL/L — SIGNIFICANT CHANGE UP (ref 3.5–5.3)
POTASSIUM SERPL-SCNC: 5.1 MMOL/L — SIGNIFICANT CHANGE UP (ref 3.5–5.3)
RBC # BLD: 3.58 M/UL — LOW (ref 3.8–5.2)
RBC # FLD: 12.3 % — SIGNIFICANT CHANGE UP (ref 10.3–14.5)
SODIUM SERPL-SCNC: 136 MMOL/L — SIGNIFICANT CHANGE UP (ref 135–145)
VANCOMYCIN TROUGH SERPL-MCNC: 19.1 UG/ML — SIGNIFICANT CHANGE UP (ref 10–20)
WBC # BLD: 5.3 K/UL — SIGNIFICANT CHANGE UP (ref 3.8–10.5)
WBC # FLD AUTO: 5.3 K/UL — SIGNIFICANT CHANGE UP (ref 3.8–10.5)

## 2017-04-14 PROCEDURE — 99232 SBSQ HOSP IP/OBS MODERATE 35: CPT

## 2017-04-14 RX ADMIN — Medication 81 MILLIGRAM(S): at 12:34

## 2017-04-14 RX ADMIN — HEPARIN SODIUM 5000 UNIT(S): 5000 INJECTION INTRAVENOUS; SUBCUTANEOUS at 06:30

## 2017-04-14 RX ADMIN — Medication 40 MILLIGRAM(S): at 08:26

## 2017-04-14 RX ADMIN — SEVELAMER CARBONATE 2400 MILLIGRAM(S): 2400 POWDER, FOR SUSPENSION ORAL at 12:35

## 2017-04-14 RX ADMIN — SEVELAMER CARBONATE 2400 MILLIGRAM(S): 2400 POWDER, FOR SUSPENSION ORAL at 08:26

## 2017-04-14 RX ADMIN — SEVELAMER CARBONATE 2400 MILLIGRAM(S): 2400 POWDER, FOR SUSPENSION ORAL at 17:25

## 2017-04-14 RX ADMIN — LISINOPRIL 40 MILLIGRAM(S): 2.5 TABLET ORAL at 06:30

## 2017-04-14 RX ADMIN — HEPARIN SODIUM 5000 UNIT(S): 5000 INJECTION INTRAVENOUS; SUBCUTANEOUS at 12:34

## 2017-04-14 RX ADMIN — AMPICILLIN SODIUM AND SULBACTAM SODIUM 200 GRAM(S): 250; 125 INJECTION, POWDER, FOR SUSPENSION INTRAMUSCULAR; INTRAVENOUS at 15:55

## 2017-04-15 LAB
ANION GAP SERPL CALC-SCNC: 21 MMOL/L — HIGH (ref 5–17)
BUN SERPL-MCNC: 16 MG/DL — SIGNIFICANT CHANGE UP (ref 7–23)
CALCIUM SERPL-MCNC: 9.2 MG/DL — SIGNIFICANT CHANGE UP (ref 8.4–10.5)
CHLORIDE SERPL-SCNC: 90 MMOL/L — LOW (ref 96–108)
CO2 SERPL-SCNC: 23 MMOL/L — SIGNIFICANT CHANGE UP (ref 22–31)
CREAT SERPL-MCNC: 4.27 MG/DL — HIGH (ref 0.5–1.3)
GLUCOSE SERPL-MCNC: 224 MG/DL — HIGH (ref 70–99)
HCT VFR BLD CALC: 36 % — SIGNIFICANT CHANGE UP (ref 34.5–45)
HGB BLD-MCNC: 10.9 G/DL — LOW (ref 11.5–15.5)
MCHC RBC-ENTMCNC: 30.3 GM/DL — LOW (ref 32–36)
MCHC RBC-ENTMCNC: 30.6 PG — SIGNIFICANT CHANGE UP (ref 27–34)
MCV RBC AUTO: 101.1 FL — HIGH (ref 80–100)
PLATELET # BLD AUTO: 236 K/UL — SIGNIFICANT CHANGE UP (ref 150–400)
POTASSIUM SERPL-MCNC: 4.7 MMOL/L — SIGNIFICANT CHANGE UP (ref 3.5–5.3)
POTASSIUM SERPL-SCNC: 4.7 MMOL/L — SIGNIFICANT CHANGE UP (ref 3.5–5.3)
RBC # BLD: 3.56 M/UL — LOW (ref 3.8–5.2)
RBC # FLD: 13.4 % — SIGNIFICANT CHANGE UP (ref 10.3–14.5)
SODIUM SERPL-SCNC: 134 MMOL/L — LOW (ref 135–145)
WBC # BLD: 5.06 K/UL — SIGNIFICANT CHANGE UP (ref 3.8–10.5)
WBC # FLD AUTO: 5.06 K/UL — SIGNIFICANT CHANGE UP (ref 3.8–10.5)

## 2017-04-15 RX ORDER — HYDROMORPHONE HYDROCHLORIDE 2 MG/ML
0.5 INJECTION INTRAMUSCULAR; INTRAVENOUS; SUBCUTANEOUS ONCE
Qty: 0 | Refills: 0 | Status: DISCONTINUED | OUTPATIENT
Start: 2017-04-15 | End: 2017-04-15

## 2017-04-15 RX ORDER — ONDANSETRON 8 MG/1
4 TABLET, FILM COATED ORAL ONCE
Qty: 0 | Refills: 0 | Status: COMPLETED | OUTPATIENT
Start: 2017-04-15 | End: 2017-04-15

## 2017-04-15 RX ADMIN — HYDROMORPHONE HYDROCHLORIDE 0.5 MILLIGRAM(S): 2 INJECTION INTRAMUSCULAR; INTRAVENOUS; SUBCUTANEOUS at 05:00

## 2017-04-15 RX ADMIN — SEVELAMER CARBONATE 2400 MILLIGRAM(S): 2400 POWDER, FOR SUSPENSION ORAL at 17:08

## 2017-04-15 RX ADMIN — ATORVASTATIN CALCIUM 20 MILLIGRAM(S): 80 TABLET, FILM COATED ORAL at 02:18

## 2017-04-15 RX ADMIN — Medication 81 MILLIGRAM(S): at 11:35

## 2017-04-15 RX ADMIN — HEPARIN SODIUM 5000 UNIT(S): 5000 INJECTION INTRAVENOUS; SUBCUTANEOUS at 02:18

## 2017-04-15 RX ADMIN — HEPARIN SODIUM 5000 UNIT(S): 5000 INJECTION INTRAVENOUS; SUBCUTANEOUS at 09:26

## 2017-04-15 RX ADMIN — ONDANSETRON 4 MILLIGRAM(S): 8 TABLET, FILM COATED ORAL at 21:32

## 2017-04-15 RX ADMIN — Medication 50 MILLIGRAM(S): at 22:36

## 2017-04-15 RX ADMIN — DULOXETINE HYDROCHLORIDE 60 MILLIGRAM(S): 30 CAPSULE, DELAYED RELEASE ORAL at 22:34

## 2017-04-15 RX ADMIN — SEVELAMER CARBONATE 2400 MILLIGRAM(S): 2400 POWDER, FOR SUSPENSION ORAL at 11:35

## 2017-04-15 RX ADMIN — ATORVASTATIN CALCIUM 20 MILLIGRAM(S): 80 TABLET, FILM COATED ORAL at 22:34

## 2017-04-15 RX ADMIN — CINACALCET 30 MILLIGRAM(S): 30 TABLET, FILM COATED ORAL at 22:34

## 2017-04-15 RX ADMIN — AMPICILLIN SODIUM AND SULBACTAM SODIUM 200 GRAM(S): 250; 125 INJECTION, POWDER, FOR SUSPENSION INTRAMUSCULAR; INTRAVENOUS at 17:08

## 2017-04-15 RX ADMIN — LISINOPRIL 40 MILLIGRAM(S): 2.5 TABLET ORAL at 06:50

## 2017-04-15 RX ADMIN — HYDROMORPHONE HYDROCHLORIDE 0.5 MILLIGRAM(S): 2 INJECTION INTRAMUSCULAR; INTRAVENOUS; SUBCUTANEOUS at 06:00

## 2017-04-15 RX ADMIN — CLOPIDOGREL BISULFATE 75 MILLIGRAM(S): 75 TABLET, FILM COATED ORAL at 02:18

## 2017-04-15 RX ADMIN — SEVELAMER CARBONATE 2400 MILLIGRAM(S): 2400 POWDER, FOR SUSPENSION ORAL at 09:25

## 2017-04-15 RX ADMIN — Medication 250 MILLIGRAM(S): at 02:43

## 2017-04-15 RX ADMIN — Medication 50 MILLIGRAM(S): at 02:18

## 2017-04-15 RX ADMIN — CINACALCET 30 MILLIGRAM(S): 30 TABLET, FILM COATED ORAL at 02:18

## 2017-04-15 RX ADMIN — CLOPIDOGREL BISULFATE 75 MILLIGRAM(S): 75 TABLET, FILM COATED ORAL at 22:34

## 2017-04-16 LAB
ANION GAP SERPL CALC-SCNC: 20 MMOL/L — HIGH (ref 5–17)
BUN SERPL-MCNC: 32 MG/DL — HIGH (ref 7–23)
CALCIUM SERPL-MCNC: 8.7 MG/DL — SIGNIFICANT CHANGE UP (ref 8.4–10.5)
CHLORIDE SERPL-SCNC: 93 MMOL/L — LOW (ref 96–108)
CO2 SERPL-SCNC: 20 MMOL/L — LOW (ref 22–31)
CREAT SERPL-MCNC: 6.66 MG/DL — HIGH (ref 0.5–1.3)
CULTURE RESULTS: SIGNIFICANT CHANGE UP
CULTURE RESULTS: SIGNIFICANT CHANGE UP
GLUCOSE SERPL-MCNC: 381 MG/DL — HIGH (ref 70–99)
HCT VFR BLD CALC: 35.2 % — SIGNIFICANT CHANGE UP (ref 34.5–45)
HGB BLD-MCNC: 10.5 G/DL — LOW (ref 11.5–15.5)
MCHC RBC-ENTMCNC: 29.8 GM/DL — LOW (ref 32–36)
MCHC RBC-ENTMCNC: 30.2 PG — SIGNIFICANT CHANGE UP (ref 27–34)
MCV RBC AUTO: 101.1 FL — HIGH (ref 80–100)
PLATELET # BLD AUTO: 227 K/UL — SIGNIFICANT CHANGE UP (ref 150–400)
POTASSIUM SERPL-MCNC: 5.5 MMOL/L — HIGH (ref 3.5–5.3)
POTASSIUM SERPL-SCNC: 5.5 MMOL/L — HIGH (ref 3.5–5.3)
RBC # BLD: 3.48 M/UL — LOW (ref 3.8–5.2)
RBC # FLD: 13.4 % — SIGNIFICANT CHANGE UP (ref 10.3–14.5)
SODIUM SERPL-SCNC: 132 MMOL/L — LOW (ref 135–145)
SPECIMEN SOURCE: SIGNIFICANT CHANGE UP
SPECIMEN SOURCE: SIGNIFICANT CHANGE UP
VANCOMYCIN TROUGH SERPL-MCNC: 28.8 UG/ML — CRITICAL HIGH (ref 10–20)
WBC # BLD: 5.91 K/UL — SIGNIFICANT CHANGE UP (ref 3.8–10.5)
WBC # FLD AUTO: 5.91 K/UL — SIGNIFICANT CHANGE UP (ref 3.8–10.5)

## 2017-04-16 RX ORDER — SODIUM POLYSTYRENE SULFONATE 4.1 MEQ/G
30 POWDER, FOR SUSPENSION ORAL ONCE
Qty: 0 | Refills: 0 | Status: COMPLETED | OUTPATIENT
Start: 2017-04-16 | End: 2017-04-16

## 2017-04-16 RX ORDER — METOCLOPRAMIDE HCL 10 MG
10 TABLET ORAL ONCE
Qty: 0 | Refills: 0 | Status: COMPLETED | OUTPATIENT
Start: 2017-04-16 | End: 2017-04-16

## 2017-04-16 RX ADMIN — SEVELAMER CARBONATE 2400 MILLIGRAM(S): 2400 POWDER, FOR SUSPENSION ORAL at 17:50

## 2017-04-16 RX ADMIN — SODIUM POLYSTYRENE SULFONATE 30 GRAM(S): 4.1 POWDER, FOR SUSPENSION ORAL at 17:50

## 2017-04-16 RX ADMIN — AMPICILLIN SODIUM AND SULBACTAM SODIUM 200 GRAM(S): 250; 125 INJECTION, POWDER, FOR SUSPENSION INTRAMUSCULAR; INTRAVENOUS at 16:08

## 2017-04-16 RX ADMIN — Medication 10 MILLIGRAM(S): at 21:52

## 2017-04-16 RX ADMIN — CLOPIDOGREL BISULFATE 75 MILLIGRAM(S): 75 TABLET, FILM COATED ORAL at 22:24

## 2017-04-16 RX ADMIN — SEVELAMER CARBONATE 2400 MILLIGRAM(S): 2400 POWDER, FOR SUSPENSION ORAL at 12:58

## 2017-04-16 RX ADMIN — SEVELAMER CARBONATE 2400 MILLIGRAM(S): 2400 POWDER, FOR SUSPENSION ORAL at 08:55

## 2017-04-16 RX ADMIN — Medication 81 MILLIGRAM(S): at 12:58

## 2017-04-16 RX ADMIN — Medication 40 MILLIGRAM(S): at 08:55

## 2017-04-16 RX ADMIN — LISINOPRIL 40 MILLIGRAM(S): 2.5 TABLET ORAL at 05:53

## 2017-04-16 RX ADMIN — ATORVASTATIN CALCIUM 20 MILLIGRAM(S): 80 TABLET, FILM COATED ORAL at 22:24

## 2017-04-16 RX ADMIN — CINACALCET 30 MILLIGRAM(S): 30 TABLET, FILM COATED ORAL at 22:24

## 2017-04-16 RX ADMIN — DULOXETINE HYDROCHLORIDE 60 MILLIGRAM(S): 30 CAPSULE, DELAYED RELEASE ORAL at 22:24

## 2017-04-16 RX ADMIN — Medication 50 MILLIGRAM(S): at 22:23

## 2017-04-17 ENCOUNTER — TRANSCRIPTION ENCOUNTER (OUTPATIENT)
Age: 62
End: 2017-04-17

## 2017-04-17 VITALS
DIASTOLIC BLOOD PRESSURE: 73 MMHG | SYSTOLIC BLOOD PRESSURE: 131 MMHG | RESPIRATION RATE: 18 BRPM | TEMPERATURE: 98 F | OXYGEN SATURATION: 97 % | HEART RATE: 83 BPM

## 2017-04-17 PROCEDURE — 82565 ASSAY OF CREATININE: CPT

## 2017-04-17 PROCEDURE — 82435 ASSAY OF BLOOD CHLORIDE: CPT

## 2017-04-17 PROCEDURE — 93005 ELECTROCARDIOGRAM TRACING: CPT

## 2017-04-17 PROCEDURE — 80053 COMPREHEN METABOLIC PANEL: CPT

## 2017-04-17 PROCEDURE — 99261: CPT

## 2017-04-17 PROCEDURE — 87040 BLOOD CULTURE FOR BACTERIA: CPT

## 2017-04-17 PROCEDURE — 82803 BLOOD GASES ANY COMBINATION: CPT

## 2017-04-17 PROCEDURE — 71046 X-RAY EXAM CHEST 2 VIEWS: CPT

## 2017-04-17 PROCEDURE — 84100 ASSAY OF PHOSPHORUS: CPT

## 2017-04-17 PROCEDURE — 96374 THER/PROPH/DIAG INJ IV PUSH: CPT

## 2017-04-17 PROCEDURE — 86803 HEPATITIS C AB TEST: CPT

## 2017-04-17 PROCEDURE — 86709 HEPATITIS A IGM ANTIBODY: CPT

## 2017-04-17 PROCEDURE — 87340 HEPATITIS B SURFACE AG IA: CPT

## 2017-04-17 PROCEDURE — 86706 HEP B SURFACE ANTIBODY: CPT

## 2017-04-17 PROCEDURE — 99285 EMERGENCY DEPT VISIT HI MDM: CPT | Mod: 25

## 2017-04-17 PROCEDURE — 84295 ASSAY OF SERUM SODIUM: CPT

## 2017-04-17 PROCEDURE — 84132 ASSAY OF SERUM POTASSIUM: CPT

## 2017-04-17 PROCEDURE — 73140 X-RAY EXAM OF FINGER(S): CPT

## 2017-04-17 PROCEDURE — 82009 KETONE BODYS QUAL: CPT

## 2017-04-17 PROCEDURE — 73218 MRI UPPER EXTREMITY W/O DYE: CPT

## 2017-04-17 PROCEDURE — 83605 ASSAY OF LACTIC ACID: CPT

## 2017-04-17 PROCEDURE — 85027 COMPLETE CBC AUTOMATED: CPT

## 2017-04-17 PROCEDURE — 85014 HEMATOCRIT: CPT

## 2017-04-17 PROCEDURE — 73120 X-RAY EXAM OF HAND: CPT

## 2017-04-17 PROCEDURE — 83036 HEMOGLOBIN GLYCOSYLATED A1C: CPT

## 2017-04-17 PROCEDURE — 86704 HEP B CORE ANTIBODY TOTAL: CPT

## 2017-04-17 PROCEDURE — 80048 BASIC METABOLIC PNL TOTAL CA: CPT

## 2017-04-17 PROCEDURE — 99232 SBSQ HOSP IP/OBS MODERATE 35: CPT

## 2017-04-17 PROCEDURE — 80202 ASSAY OF VANCOMYCIN: CPT

## 2017-04-17 PROCEDURE — 86705 HEP B CORE ANTIBODY IGM: CPT

## 2017-04-17 PROCEDURE — 83735 ASSAY OF MAGNESIUM: CPT

## 2017-04-17 PROCEDURE — 82330 ASSAY OF CALCIUM: CPT

## 2017-04-17 PROCEDURE — 82947 ASSAY GLUCOSE BLOOD QUANT: CPT

## 2017-04-17 RX ORDER — MINOCYCLINE HYDROCHLORIDE 45 MG/1
1 TABLET, EXTENDED RELEASE ORAL
Qty: 56 | Refills: 0 | OUTPATIENT
Start: 2017-04-17 | End: 2017-05-15

## 2017-04-17 RX ORDER — INSULIN LISPRO 100/ML
1 VIAL (ML) SUBCUTANEOUS
Qty: 0 | Refills: 0 | COMMUNITY
Start: 2017-04-17

## 2017-04-17 RX ORDER — OXYCODONE HYDROCHLORIDE 5 MG/1
1 TABLET ORAL
Qty: 20 | Refills: 0 | OUTPATIENT
Start: 2017-04-17 | End: 2017-04-22

## 2017-04-17 RX ORDER — OXYCODONE HYDROCHLORIDE 5 MG/1
1 TABLET ORAL
Qty: 0 | Refills: 0 | COMMUNITY
Start: 2017-04-17

## 2017-04-17 RX ORDER — INSULIN LISPRO 100/ML
0 VIAL (ML) SUBCUTANEOUS
Qty: 0 | Refills: 0 | COMMUNITY
Start: 2017-04-17

## 2017-04-17 RX ADMIN — SEVELAMER CARBONATE 2400 MILLIGRAM(S): 2400 POWDER, FOR SUSPENSION ORAL at 11:50

## 2017-04-17 RX ADMIN — SEVELAMER CARBONATE 2400 MILLIGRAM(S): 2400 POWDER, FOR SUSPENSION ORAL at 08:12

## 2017-04-17 RX ADMIN — Medication 81 MILLIGRAM(S): at 11:49

## 2017-04-17 RX ADMIN — LISINOPRIL 40 MILLIGRAM(S): 2.5 TABLET ORAL at 05:03

## 2017-04-17 RX ADMIN — Medication 40 MILLIGRAM(S): at 08:12

## 2017-04-17 NOTE — DISCHARGE NOTE ADULT - PATIENT PORTAL LINK FT
“You can access the Follow[facility] Patient Portal, offered by [facility], by registering with the following website: [email or web address]”

## 2017-04-17 NOTE — DISCHARGE NOTE ADULT - ADDITIONAL INSTRUCTIONS
Follow-up with your primary care physician, Follow-up with your primary care physician.  follow-up with your primary Nephrologist Dr. Almonte and Endocrinologist Dr. Gogo Ley in 1 week.

## 2017-04-17 NOTE — DISCHARGE NOTE ADULT - SECONDARY DIAGNOSIS.
ESRD (end stage renal disease) Diabetes mellitus type 1 CAD (coronary artery disease) Essential hypertension CHF (congestive heart failure)

## 2017-04-17 NOTE — DISCHARGE NOTE ADULT - CARE PROVIDER_API CALL
Daisy Burger (DO), Nephrology  [address] [place] [address]  Phone: [phone]  Fax: [phone] Pina Ley (SUSAN), EndocrinologyMetabDiabetes  [address] [place] [address]  Phone: [phone]  Fax: [phone]    Arsalan Castro), Internal Medicine  [id number] [place] [address]  Phone: [phone]  Fax: [phone]

## 2017-04-17 NOTE — DISCHARGE NOTE ADULT - CARE PLAN
Principal Discharge DX:	Cellulitis and abscess of finger of right hand  Goal:	Improved.  Instructions for follow-up, activity and diet:	Take all of your antibiotics as ordered.  Call your Health Care Provider within two days of arriving home to make a follow up appointment within one week.  If the affected cellulitic area increases in redness, warmth, pain or swelling call your Health Care Provider.  If you develop fever, chills, and/or malaise, call your Health Care Provider.  Follow-up with Dr. Bhatt , Plastics Surgeon in 1 week.  Secondary Diagnosis:	ESRD (end stage renal disease)  Goal:	Excess fluids and waste products will be removed from your blood; your electrolytes will be balanced; your blood pressure will be controlled.  Instructions for follow-up, activity and diet:	Continue with your dialysis treatments. Follow with your nephrologist (kidney physician). Continue your medications as prescribed.  Secondary Diagnosis:	Diabetes mellitus type 1  Instructions for follow-up, activity and diet:	HgA1C this admission.  Make sure you get your HgA1c checked every three months.  If you take oral diabetes medications, check your blood glucose two times a day.  If you take insulin, check your blood glucose before meals and at bedtime.  It's important not to skip any meals.  Keep a log of your blood glucose results and always take it with you to your doctor appointments.  Keep a list of your current medications including injectables and over the counter medications and bring this medication list with you to all your doctor appointments.  If you have not seen your ophthalmologist this year call for appointment.  Check your feet daily for redness, sores, or openings. Do not self treat. If no improvement in two days call your primary care physician for an appointment.  Low blood sugar (hypoglycemia) is a blood sugar below 70mg/dl. Check your blood sugar if you feel signs/symptoms of hypoglycemia. If your blood sugar is below 70 take 15 grams of carbohydrates (ex 4 oz of apple juice, 3-4 glucose tablets, or 4-6 oz of regular soda) wait 15 minutes and repeat blood sugar to make sure it comes up above 70.  If your blood sugar is above 70 and you are due for a meal, have a meal.  If you are not due for a meal have a snack.  This snack helps keeps your blood sugar at a safe range.  Secondary Diagnosis:	CAD (coronary artery disease)  Instructions for follow-up, activity and diet:	Coronary artery disease is a condition where the arteries the supply the heart muscle gets clogged with fatty deposits & puts you at risk for a heart attack. Continue with medications as prescribed.   Call your doctor if you have any new pain, pressure, or discomfort in the center of your chest, pain, tingling or discomfort in arms, back, neck, jaw, or stomach, shortness of breath, nausea, vomiting, burping or heartburn, sweating, cold and clammy skin, racing or abnormal heartbeat for more than 10 minutes or if they keep coming & going.  Call 911 and do not tr to get to hospital by care  You can help yourself with lifestyle changes (quitting smoking if you smoke), eat lots of fruits & vegetables & low fat dairy products, not a lot of meat & fatty foods, walk or some form of physical activity most days of the week, lose weight if you are overweight  Take your cardiac medication as prescribed to lower cholesterol, to lower blood pressure, aspirin to prevent blood clots, and diabetes control  Make sure to keep appointments with doctor for cardiac follow up care  Secondary Diagnosis:	Essential hypertension  Instructions for follow-up, activity and diet:	Low salt diet  Activity as tolerated.  Take all medication as prescribed.  Follow up with your medical doctor for routine blood pressure monitoring at your next visit.  Notify your doctor if you have any of the following symptoms:   Dizziness, Lightheadedness, Blurry vision, Headache, Chest pain, Shortness of breath  Secondary Diagnosis:	CHF (congestive heart failure)  Instructions for follow-up, activity and diet:	Weigh yourself daily.  If you gain 3lbs in 3 days, or 5lbs in a week call your Health Care Provider.  Do not eat or drink foods containing more than 2000mg of salt (sodium) in your diet every day.  Call your Health Care Provider if you have any swelling or increased swelling in your feet, ankles, and/or stomach.  Take all of your medication as directed.  If you become dizzy call your Health Care Provider. Principal Discharge DX:	Cellulitis and abscess of finger of right hand  Goal:	Improved.  Instructions for follow-up, activity and diet:	Take all of your antibiotics as ordered.  Call your Health Care Provider within two days of arriving home to make a follow up appointment within one week.  If the affected cellulitic area increases in redness, warmth, pain or swelling call your Health Care Provider.  If you develop fever, chills, and/or malaise, call your Health Care Provider.  Follow-up with Dr. Bhatt , Plastics Surgeon in 1 week.  Secondary Diagnosis:	ESRD (end stage renal disease)  Goal:	Excess fluids and waste products will be removed from your blood; your electrolytes will be balanced; your blood pressure will be controlled.  Instructions for follow-up, activity and diet:	Continue with your dialysis treatments. Follow with your nephrologist (kidney physician). Continue your medications as prescribed.  Secondary Diagnosis:	Diabetes mellitus type 1  Instructions for follow-up, activity and diet:	HgA1C this admission 8.5  Make sure you get your HgA1c checked every three months.  If you take oral diabetes medications, check your blood glucose two times a day.  If you take insulin, check your blood glucose before meals and at bedtime.  It's important not to skip any meals.  Keep a log of your blood glucose results and always take it with you to your doctor appointments.  Keep a list of your current medications including injectables and over the counter medications and bring this medication list with you to all your doctor appointments.  If you have not seen your ophthalmologist this year call for appointment.  Check your feet daily for redness, sores, or openings. Do not self treat. If no improvement in two days call your primary care physician for an appointment.  Low blood sugar (hypoglycemia) is a blood sugar below 70mg/dl. Check your blood sugar if you feel signs/symptoms of hypoglycemia. If your blood sugar is below 70 take 15 grams of carbohydrates (ex 4 oz of apple juice, 3-4 glucose tablets, or 4-6 oz of regular soda) wait 15 minutes and repeat blood sugar to make sure it comes up above 70.  If your blood sugar is above 70 and you are due for a meal, have a meal.  If you are not due for a meal have a snack.  This snack helps keeps your blood sugar at a safe range.  Secondary Diagnosis:	CAD (coronary artery disease)  Instructions for follow-up, activity and diet:	Coronary artery disease is a condition where the arteries the supply the heart muscle gets clogged with fatty deposits & puts you at risk for a heart attack. Continue with medications as prescribed.   Call your doctor if you have any new pain, pressure, or discomfort in the center of your chest, pain, tingling or discomfort in arms, back, neck, jaw, or stomach, shortness of breath, nausea, vomiting, burping or heartburn, sweating, cold and clammy skin, racing or abnormal heartbeat for more than 10 minutes or if they keep coming & going.  Call 911 and do not tr to get to hospital by care  You can help yourself with lifestyle changes (quitting smoking if you smoke), eat lots of fruits & vegetables & low fat dairy products, not a lot of meat & fatty foods, walk or some form of physical activity most days of the week, lose weight if you are overweight  Take your cardiac medication as prescribed to lower cholesterol, to lower blood pressure, aspirin to prevent blood clots, and diabetes control  Make sure to keep appointments with doctor for cardiac follow up care  Secondary Diagnosis:	Essential hypertension  Instructions for follow-up, activity and diet:	Low salt diet  Activity as tolerated.  Take all medication as prescribed.  Follow up with your medical doctor for routine blood pressure monitoring at your next visit.  Notify your doctor if you have any of the following symptoms:   Dizziness, Lightheadedness, Blurry vision, Headache, Chest pain, Shortness of breath  Secondary Diagnosis:	CHF (congestive heart failure)  Instructions for follow-up, activity and diet:	Weigh yourself daily.  If you gain 3lbs in 3 days, or 5lbs in a week call your Health Care Provider.  Do not eat or drink foods containing more than 2000mg of salt (sodium) in your diet every day.  Call your Health Care Provider if you have any swelling or increased swelling in your feet, ankles, and/or stomach.  Take all of your medication as directed.  If you become dizzy call your Health Care Provider.

## 2017-04-17 NOTE — DISCHARGE NOTE ADULT - PLAN OF CARE
Improved. Take all of your antibiotics as ordered.  Call your Health Care Provider within two days of arriving home to make a follow up appointment within one week.  If the affected cellulitic area increases in redness, warmth, pain or swelling call your Health Care Provider.  If you develop fever, chills, and/or malaise, call your Health Care Provider.  Follow-up with Dr. Bhatt , Plastics Surgeon in 1 week. Excess fluids and waste products will be removed from your blood; your electrolytes will be balanced; your blood pressure will be controlled. Continue with your dialysis treatments. Follow with your nephrologist (kidney physician). Continue your medications as prescribed. HgA1C this admission.  Make sure you get your HgA1c checked every three months.  If you take oral diabetes medications, check your blood glucose two times a day.  If you take insulin, check your blood glucose before meals and at bedtime.  It's important not to skip any meals.  Keep a log of your blood glucose results and always take it with you to your doctor appointments.  Keep a list of your current medications including injectables and over the counter medications and bring this medication list with you to all your doctor appointments.  If you have not seen your ophthalmologist this year call for appointment.  Check your feet daily for redness, sores, or openings. Do not self treat. If no improvement in two days call your primary care physician for an appointment.  Low blood sugar (hypoglycemia) is a blood sugar below 70mg/dl. Check your blood sugar if you feel signs/symptoms of hypoglycemia. If your blood sugar is below 70 take 15 grams of carbohydrates (ex 4 oz of apple juice, 3-4 glucose tablets, or 4-6 oz of regular soda) wait 15 minutes and repeat blood sugar to make sure it comes up above 70.  If your blood sugar is above 70 and you are due for a meal, have a meal.  If you are not due for a meal have a snack.  This snack helps keeps your blood sugar at a safe range. Coronary artery disease is a condition where the arteries the supply the heart muscle gets clogged with fatty deposits & puts you at risk for a heart attack. Continue with medications as prescribed.   Call your doctor if you have any new pain, pressure, or discomfort in the center of your chest, pain, tingling or discomfort in arms, back, neck, jaw, or stomach, shortness of breath, nausea, vomiting, burping or heartburn, sweating, cold and clammy skin, racing or abnormal heartbeat for more than 10 minutes or if they keep coming & going.  Call 911 and do not tr to get to hospital by care  You can help yourself with lifestyle changes (quitting smoking if you smoke), eat lots of fruits & vegetables & low fat dairy products, not a lot of meat & fatty foods, walk or some form of physical activity most days of the week, lose weight if you are overweight  Take your cardiac medication as prescribed to lower cholesterol, to lower blood pressure, aspirin to prevent blood clots, and diabetes control  Make sure to keep appointments with doctor for cardiac follow up care Low salt diet  Activity as tolerated.  Take all medication as prescribed.  Follow up with your medical doctor for routine blood pressure monitoring at your next visit.  Notify your doctor if you have any of the following symptoms:   Dizziness, Lightheadedness, Blurry vision, Headache, Chest pain, Shortness of breath Weigh yourself daily.  If you gain 3lbs in 3 days, or 5lbs in a week call your Health Care Provider.  Do not eat or drink foods containing more than 2000mg of salt (sodium) in your diet every day.  Call your Health Care Provider if you have any swelling or increased swelling in your feet, ankles, and/or stomach.  Take all of your medication as directed.  If you become dizzy call your Health Care Provider. HgA1C this admission 8.5  Make sure you get your HgA1c checked every three months.  If you take oral diabetes medications, check your blood glucose two times a day.  If you take insulin, check your blood glucose before meals and at bedtime.  It's important not to skip any meals.  Keep a log of your blood glucose results and always take it with you to your doctor appointments.  Keep a list of your current medications including injectables and over the counter medications and bring this medication list with you to all your doctor appointments.  If you have not seen your ophthalmologist this year call for appointment.  Check your feet daily for redness, sores, or openings. Do not self treat. If no improvement in two days call your primary care physician for an appointment.  Low blood sugar (hypoglycemia) is a blood sugar below 70mg/dl. Check your blood sugar if you feel signs/symptoms of hypoglycemia. If your blood sugar is below 70 take 15 grams of carbohydrates (ex 4 oz of apple juice, 3-4 glucose tablets, or 4-6 oz of regular soda) wait 15 minutes and repeat blood sugar to make sure it comes up above 70.  If your blood sugar is above 70 and you are due for a meal, have a meal.  If you are not due for a meal have a snack.  This snack helps keeps your blood sugar at a safe range.

## 2017-04-17 NOTE — DISCHARGE NOTE ADULT - HOSPITAL COURSE
To be completed by attending. 62 yo woman with PMH of  HTN, HLD, DM1 (AIC 8) on Medtronic Novolog Insulin Pump, CAD with prior MI, s/p stents x 4, NSTEMI 1/2017 s/p PCI/POBA/Atherectomy pRCA 75%, mRCA 95%, ESRD with LUE Fistula on HD(Tues/Thurs/Sat.- last session on 4/8), HFpEF EF 55%, PVD s/p bilateral bypass, left subclavian vein stent, CVA x 3 with left sided weakness and short term memory loss, TIA  with recent hospitalization for planned Brachytherapy for RCA on 3/29-3/30 presenting with right thumb pain, swelling and redness in the past 2-3 weeks. 4/10: s/p HD, Vanco X 1 dose in ED . f/u vanco trough.          pt on insulin pump, house endo called, to see pt in AM   4/11: seen by endocrine: [id number] w/ insulin pump           seen by hand surgeon: no sx intervention. cont vanco/unasyn for cellulitis           ESRD- on HD T/Th/Sat  4/12: cat scratch infection of rt thumb: cont vanco and unasyn  4/13: ID recommending xray / MRI of effected finger to r/o foreign object  	- Xray ordered by Dr Viera  4/14: MRI Hand completed 4/13: no evidence of osteo / + edema  	- Hemodialysis today.    04/14 Night PA: Spoke to Dr. Schmidt, norm HD X 3 hours Friday and HD x1 hours on Saturday.   4/16 Vanco trough 28.8. No Sx of toxicity. s/w Dr. Burger. HD on Monday. No Vanco 	today.           K- 5.5 kayexalate 30. repeat k after BM. HD tomorrow. s/w Dr. Burger  4/17 HD today  4/17 Plastic Surgeon (Dr. NORBERTO Stephenson): no evid of tendon rupture. No surg intervention.        Dx: Cellulitis, hyperkalemia

## 2017-04-17 NOTE — DISCHARGE NOTE ADULT - MEDICATION SUMMARY - MEDICATIONS TO TAKE
I will START or STAY ON the medications listed below when I get home from the hospital:    aspirin 81 mg oral delayed release tablet  -- 1 tab(s) by mouth once a day  -- Indication: For CAD (coronary artery disease)    acetaminophen-oxyCODONE 325 mg-5 mg oral tablet  -- 1 tab(s) by mouth every 6 hours, As needed, Moderate Pain (4 - 6)  -- Indication: For Pain    lisinopril 40 mg oral tablet  -- 1 tab(s) by mouth once a day  -- Indication: For Essential hypertension    DULoxetine 60 mg oral delayed release capsule  -- 1 cap(s) by mouth once a day (at bedtime)  -- Indication: For Anxiety    insulin lispro 100 units/mL subcutaneous solution  -- 1 each subcutaneous   -- Indication: For Diabetes mellitus type 1    atorvastatin 20 mg oral tablet  -- 1 tab(s) by mouth once a day (at bedtime)  -- Indication: For Hyperlipidemia    clopidogrel 75 mg oral tablet  -- 1 tab(s) by mouth once a day (at bedtime)  -- Indication: For CAD (coronary artery disease) with stents    metoprolol succinate 50 mg oral tablet, extended release  -- 1 tab(s) by mouth once a day (at bedtime)  -- Indication: For Essential hypertension    Lasix 40 mg oral tablet  -- 1 tab(s) by mouth 3 times a week on Sunday, Monday and Friday (at bedtime)  -- Indication: For CHF (congestive heart failure)    Sensipar 30 mg oral tablet  -- 1 tab(s) by mouth once a day (at bedtime)  -- Indication: For Prophylactic measure    Renvela 800 mg oral tablet  -- 3 tab(s) by mouth 3 times a day  -- Indication: For Hyperphosphatemia    multivitamin  -- 1 tab(s) by mouth once a day (at bedtime)  -- Indication: For supplement I will START or STAY ON the medications listed below when I get home from the hospital:    aspirin 81 mg oral delayed release tablet  -- 1 tab(s) by mouth once a day  -- Indication: For CAD (coronary artery disease)    acetaminophen-oxyCODONE 325 mg-5 mg oral tablet  -- 1 tab(s) by mouth every 6 hours, As needed, Moderate Pain (4 - 6) MDD:4  -- Indication: For Pain    lisinopril 40 mg oral tablet  -- 1 tab(s) by mouth once a day  -- Indication: For Essential hypertension    DULoxetine 60 mg oral delayed release capsule  -- 1 cap(s) by mouth once a day (at bedtime)  -- Indication: For Anxiety    insulin lispro 100 units/mL subcutaneous solution  -- Insulin pump:   00:00h 0.4units/hr  03:30h: 0.425units/hr    Insulin to Carb ratio: 00:00h : 1 to 9  12P: 1 to 12    ISF:  00:00h: ISF 60  07:00h: ISF 40  18:00h: ISF 60    -- Indication: For Diabetes mellitus type 1    atorvastatin 20 mg oral tablet  -- 1 tab(s) by mouth once a day (at bedtime)  -- Indication: For Hyperlipidemia    clopidogrel 75 mg oral tablet  -- 1 tab(s) by mouth once a day (at bedtime)  -- Indication: For CAD (coronary artery disease) with stents    metoprolol succinate 50 mg oral tablet, extended release  -- 1 tab(s) by mouth once a day (at bedtime)  -- Indication: For Essential hypertension    Lasix 40 mg oral tablet  -- 1 tab(s) by mouth 3 times a week on Sunday, Monday and Friday (at bedtime)  -- Indication: For CHF (congestive heart failure)    Sensipar 30 mg oral tablet  -- 1 tab(s) by mouth once a day (at bedtime)  -- Indication: For Hypercalcemia    Augmentin 875 mg-125 mg oral tablet  -- 875 milligram(s) by mouth every 12 hours MDD:2  -- Finish all this medication unless otherwise directed by prescriber.  Take with food or milk.    -- Indication: For Cellulitis and abscess of finger of right hand    Renvela 800 mg oral tablet  -- 3 tab(s) by mouth 3 times a day  -- Indication: For Hyperphosphatemia    Minocin 100 mg oral capsule  -- 1 cap(s) by mouth 2 times a day MDD:2  -- Avoid prolonged or excessive exposure to direct and/or artificial sunlight while taking this medication.  Do not take this drug if you are pregnant.  Finish all this medication unless otherwise directed by prescriber.  May cause drowsiness or dizziness.  Medication should be taken with plenty of water.    -- Indication: For Cellulitis and abscess of finger of right hand    multivitamin  -- 1 tab(s) by mouth once a day (at bedtime)  -- Indication: For supplement

## 2017-05-10 ENCOUNTER — INPATIENT (INPATIENT)
Facility: HOSPITAL | Age: 62
LOS: 2 days | Discharge: HOME | DRG: 637 | End: 2017-05-13
Attending: INTERNAL MEDICINE | Admitting: INTERNAL MEDICINE
Payer: MEDICARE

## 2017-05-10 VITALS
TEMPERATURE: 98 F | OXYGEN SATURATION: 99 % | RESPIRATION RATE: 20 BRPM | SYSTOLIC BLOOD PRESSURE: 201 MMHG | HEART RATE: 68 BPM | DIASTOLIC BLOOD PRESSURE: 84 MMHG

## 2017-05-10 DIAGNOSIS — R06.02 SHORTNESS OF BREATH: ICD-10-CM

## 2017-05-10 DIAGNOSIS — Z98.89 OTHER SPECIFIED POSTPROCEDURAL STATES: Chronic | ICD-10-CM

## 2017-05-10 DIAGNOSIS — Z29.9 ENCOUNTER FOR PROPHYLACTIC MEASURES, UNSPECIFIED: ICD-10-CM

## 2017-05-10 DIAGNOSIS — E78.5 HYPERLIPIDEMIA, UNSPECIFIED: ICD-10-CM

## 2017-05-10 DIAGNOSIS — I10 ESSENTIAL (PRIMARY) HYPERTENSION: ICD-10-CM

## 2017-05-10 DIAGNOSIS — E10.10 TYPE 1 DIABETES MELLITUS WITH KETOACIDOSIS WITHOUT COMA: ICD-10-CM

## 2017-05-10 LAB
ALBUMIN SERPL ELPH-MCNC: 4 G/DL — SIGNIFICANT CHANGE UP (ref 3.3–5)
ALP SERPL-CCNC: 255 U/L — HIGH (ref 40–120)
ALT FLD-CCNC: 17 U/L RC — SIGNIFICANT CHANGE UP (ref 10–45)
ANION GAP SERPL CALC-SCNC: 19 MMOL/L — HIGH (ref 5–17)
APTT BLD: 33.2 SEC — SIGNIFICANT CHANGE UP (ref 27.5–37.4)
AST SERPL-CCNC: 23 U/L — SIGNIFICANT CHANGE UP (ref 10–40)
BASOPHILS # BLD AUTO: 0.1 K/UL — SIGNIFICANT CHANGE UP (ref 0–0.2)
BASOPHILS NFR BLD AUTO: 1.4 % — SIGNIFICANT CHANGE UP (ref 0–2)
BILIRUB SERPL-MCNC: 0.3 MG/DL — SIGNIFICANT CHANGE UP (ref 0.2–1.2)
BUN SERPL-MCNC: 41 MG/DL — HIGH (ref 7–23)
CALCIUM SERPL-MCNC: 8.6 MG/DL — SIGNIFICANT CHANGE UP (ref 8.4–10.5)
CHLORIDE SERPL-SCNC: 93 MMOL/L — LOW (ref 96–108)
CK MB BLD-MCNC: 2 % — SIGNIFICANT CHANGE UP (ref 0–3.5)
CK MB CFR SERPL CALC: 4.3 NG/ML — HIGH (ref 0–3.8)
CK SERPL-CCNC: 218 U/L — HIGH (ref 25–170)
CO2 SERPL-SCNC: 29 MMOL/L — SIGNIFICANT CHANGE UP (ref 22–31)
CREAT SERPL-MCNC: 6.05 MG/DL — HIGH (ref 0.5–1.3)
EOSINOPHIL # BLD AUTO: 0.1 K/UL — SIGNIFICANT CHANGE UP (ref 0–0.5)
EOSINOPHIL NFR BLD AUTO: 3.5 % — SIGNIFICANT CHANGE UP (ref 0–6)
GLUCOSE SERPL-MCNC: 248 MG/DL — HIGH (ref 70–99)
HCT VFR BLD CALC: 30.7 % — LOW (ref 34.5–45)
HGB BLD-MCNC: 10.4 G/DL — LOW (ref 11.5–15.5)
INR BLD: 1.07 RATIO — SIGNIFICANT CHANGE UP (ref 0.88–1.16)
LYMPHOCYTES # BLD AUTO: 1.2 K/UL — SIGNIFICANT CHANGE UP (ref 1–3.3)
LYMPHOCYTES # BLD AUTO: 28.3 % — SIGNIFICANT CHANGE UP (ref 13–44)
MCHC RBC-ENTMCNC: 33 PG — SIGNIFICANT CHANGE UP (ref 27–34)
MCHC RBC-ENTMCNC: 33.8 GM/DL — SIGNIFICANT CHANGE UP (ref 32–36)
MCV RBC AUTO: 97.8 FL — SIGNIFICANT CHANGE UP (ref 80–100)
MONOCYTES # BLD AUTO: 0.3 K/UL — SIGNIFICANT CHANGE UP (ref 0–0.9)
MONOCYTES NFR BLD AUTO: 6.7 % — SIGNIFICANT CHANGE UP (ref 2–14)
NEUTROPHILS # BLD AUTO: 2.5 K/UL — SIGNIFICANT CHANGE UP (ref 1.8–7.4)
NEUTROPHILS NFR BLD AUTO: 60.1 % — SIGNIFICANT CHANGE UP (ref 43–77)
NT-PROBNP SERPL-SCNC: HIGH PG/ML (ref 0–300)
PLATELET # BLD AUTO: 211 K/UL — SIGNIFICANT CHANGE UP (ref 150–400)
POTASSIUM SERPL-MCNC: 4.5 MMOL/L — SIGNIFICANT CHANGE UP (ref 3.5–5.3)
POTASSIUM SERPL-SCNC: 4.5 MMOL/L — SIGNIFICANT CHANGE UP (ref 3.5–5.3)
PROT SERPL-MCNC: 7.1 G/DL — SIGNIFICANT CHANGE UP (ref 6–8.3)
PROTHROM AB SERPL-ACNC: 11.6 SEC — SIGNIFICANT CHANGE UP (ref 9.8–12.7)
RBC # BLD: 3.14 M/UL — LOW (ref 3.8–5.2)
RBC # FLD: 12.7 % — SIGNIFICANT CHANGE UP (ref 10.3–14.5)
SODIUM SERPL-SCNC: 141 MMOL/L — SIGNIFICANT CHANGE UP (ref 135–145)
TROPONIN T SERPL-MCNC: 0.08 NG/ML — HIGH (ref 0–0.06)
TROPONIN T SERPL-MCNC: 0.13 NG/ML — HIGH (ref 0–0.06)
WBC # BLD: 4.1 K/UL — SIGNIFICANT CHANGE UP (ref 3.8–10.5)
WBC # FLD AUTO: 4.1 K/UL — SIGNIFICANT CHANGE UP (ref 3.8–10.5)

## 2017-05-10 PROCEDURE — 99223 1ST HOSP IP/OBS HIGH 75: CPT

## 2017-05-10 PROCEDURE — 99223 1ST HOSP IP/OBS HIGH 75: CPT | Mod: GC

## 2017-05-10 PROCEDURE — 93010 ELECTROCARDIOGRAM REPORT: CPT

## 2017-05-10 PROCEDURE — 99285 EMERGENCY DEPT VISIT HI MDM: CPT | Mod: 25,GC

## 2017-05-10 PROCEDURE — 71010: CPT | Mod: 26

## 2017-05-10 RX ORDER — SEVELAMER CARBONATE 2400 MG/1
2400 POWDER, FOR SUSPENSION ORAL
Qty: 0 | Refills: 0 | Status: DISCONTINUED | OUTPATIENT
Start: 2017-05-10 | End: 2017-05-13

## 2017-05-10 RX ORDER — HYDRALAZINE HCL 50 MG
25 TABLET ORAL EVERY 12 HOURS
Qty: 0 | Refills: 0 | Status: DISCONTINUED | OUTPATIENT
Start: 2017-05-10 | End: 2017-05-11

## 2017-05-10 RX ORDER — HYDRALAZINE HCL 50 MG
10 TABLET ORAL
Qty: 0 | Refills: 0 | Status: DISCONTINUED | OUTPATIENT
Start: 2017-05-10 | End: 2017-05-10

## 2017-05-10 RX ORDER — CINACALCET 30 MG/1
30 TABLET, FILM COATED ORAL AT BEDTIME
Qty: 0 | Refills: 0 | Status: DISCONTINUED | OUTPATIENT
Start: 2017-05-10 | End: 2017-05-13

## 2017-05-10 RX ORDER — ACETAMINOPHEN 500 MG
650 TABLET ORAL EVERY 6 HOURS
Qty: 0 | Refills: 0 | Status: DISCONTINUED | OUTPATIENT
Start: 2017-05-10 | End: 2017-05-13

## 2017-05-10 RX ORDER — SODIUM CHLORIDE 9 MG/ML
3 INJECTION INTRAMUSCULAR; INTRAVENOUS; SUBCUTANEOUS ONCE
Qty: 0 | Refills: 0 | Status: COMPLETED | OUTPATIENT
Start: 2017-05-10 | End: 2017-05-10

## 2017-05-10 RX ORDER — DEXTROSE 50 % IN WATER 50 %
25 SYRINGE (ML) INTRAVENOUS ONCE
Qty: 0 | Refills: 0 | Status: DISCONTINUED | OUTPATIENT
Start: 2017-05-10 | End: 2017-05-13

## 2017-05-10 RX ORDER — DEXTROSE 50 % IN WATER 50 %
1 SYRINGE (ML) INTRAVENOUS ONCE
Qty: 0 | Refills: 0 | Status: DISCONTINUED | OUTPATIENT
Start: 2017-05-10 | End: 2017-05-13

## 2017-05-10 RX ORDER — HEPARIN SODIUM 5000 [USP'U]/ML
5000 INJECTION INTRAVENOUS; SUBCUTANEOUS EVERY 12 HOURS
Qty: 0 | Refills: 0 | Status: DISCONTINUED | OUTPATIENT
Start: 2017-05-10 | End: 2017-05-13

## 2017-05-10 RX ORDER — LISINOPRIL 2.5 MG/1
1 TABLET ORAL
Qty: 30 | Refills: 0 | OUTPATIENT

## 2017-05-10 RX ORDER — ASPIRIN/CALCIUM CARB/MAGNESIUM 324 MG
81 TABLET ORAL DAILY
Qty: 0 | Refills: 0 | Status: DISCONTINUED | OUTPATIENT
Start: 2017-05-10 | End: 2017-05-13

## 2017-05-10 RX ORDER — ACETAMINOPHEN 500 MG
1000 TABLET ORAL ONCE
Qty: 0 | Refills: 0 | Status: COMPLETED | OUTPATIENT
Start: 2017-05-10 | End: 2017-05-11

## 2017-05-10 RX ORDER — SODIUM CHLORIDE 9 MG/ML
1000 INJECTION, SOLUTION INTRAVENOUS
Qty: 0 | Refills: 0 | Status: DISCONTINUED | OUTPATIENT
Start: 2017-05-10 | End: 2017-05-13

## 2017-05-10 RX ORDER — LISINOPRIL 2.5 MG/1
40 TABLET ORAL DAILY
Qty: 0 | Refills: 0 | Status: DISCONTINUED | OUTPATIENT
Start: 2017-05-10 | End: 2017-05-13

## 2017-05-10 RX ORDER — FUROSEMIDE 40 MG
80 TABLET ORAL ONCE
Qty: 0 | Refills: 0 | Status: COMPLETED | OUTPATIENT
Start: 2017-05-10 | End: 2017-05-10

## 2017-05-10 RX ORDER — FUROSEMIDE 40 MG
40 TABLET ORAL
Qty: 0 | Refills: 0 | Status: DISCONTINUED | OUTPATIENT
Start: 2017-05-10 | End: 2017-05-13

## 2017-05-10 RX ORDER — ATORVASTATIN CALCIUM 80 MG/1
20 TABLET, FILM COATED ORAL AT BEDTIME
Qty: 0 | Refills: 0 | Status: DISCONTINUED | OUTPATIENT
Start: 2017-05-10 | End: 2017-05-13

## 2017-05-10 RX ORDER — GLUCAGON INJECTION, SOLUTION 0.5 MG/.1ML
1 INJECTION, SOLUTION SUBCUTANEOUS ONCE
Qty: 0 | Refills: 0 | Status: DISCONTINUED | OUTPATIENT
Start: 2017-05-10 | End: 2017-05-13

## 2017-05-10 RX ORDER — INSULIN ASPART 100 [IU]/ML
1 INJECTION, SOLUTION SUBCUTANEOUS
Qty: 0 | Refills: 0 | Status: DISCONTINUED | OUTPATIENT
Start: 2017-05-10 | End: 2017-05-11

## 2017-05-10 RX ORDER — METOPROLOL TARTRATE 50 MG
50 TABLET ORAL DAILY
Qty: 0 | Refills: 0 | Status: DISCONTINUED | OUTPATIENT
Start: 2017-05-10 | End: 2017-05-13

## 2017-05-10 RX ORDER — ASPIRIN/CALCIUM CARB/MAGNESIUM 324 MG
324 TABLET ORAL ONCE
Qty: 0 | Refills: 0 | Status: COMPLETED | OUTPATIENT
Start: 2017-05-10 | End: 2017-05-10

## 2017-05-10 RX ORDER — DULOXETINE HYDROCHLORIDE 30 MG/1
60 CAPSULE, DELAYED RELEASE ORAL AT BEDTIME
Qty: 0 | Refills: 0 | Status: DISCONTINUED | OUTPATIENT
Start: 2017-05-10 | End: 2017-05-13

## 2017-05-10 RX ORDER — DEXTROSE 50 % IN WATER 50 %
12.5 SYRINGE (ML) INTRAVENOUS ONCE
Qty: 0 | Refills: 0 | Status: DISCONTINUED | OUTPATIENT
Start: 2017-05-10 | End: 2017-05-13

## 2017-05-10 RX ORDER — NITROGLYCERIN 6.5 MG
0.4 CAPSULE, EXTENDED RELEASE ORAL
Qty: 0 | Refills: 0 | Status: COMPLETED | OUTPATIENT
Start: 2017-05-10 | End: 2017-05-10

## 2017-05-10 RX ORDER — CLOPIDOGREL BISULFATE 75 MG/1
75 TABLET, FILM COATED ORAL AT BEDTIME
Qty: 0 | Refills: 0 | Status: DISCONTINUED | OUTPATIENT
Start: 2017-05-10 | End: 2017-05-13

## 2017-05-10 RX ORDER — HYDRALAZINE HCL 50 MG
10 TABLET ORAL ONCE
Qty: 0 | Refills: 0 | Status: COMPLETED | OUTPATIENT
Start: 2017-05-10 | End: 2017-05-10

## 2017-05-10 RX ADMIN — ATORVASTATIN CALCIUM 20 MILLIGRAM(S): 80 TABLET, FILM COATED ORAL at 23:04

## 2017-05-10 RX ADMIN — Medication 50 MILLIGRAM(S): at 16:25

## 2017-05-10 RX ADMIN — Medication 40 MILLIGRAM(S): at 16:25

## 2017-05-10 RX ADMIN — Medication 81 MILLIGRAM(S): at 16:27

## 2017-05-10 RX ADMIN — Medication 10 MILLIGRAM(S): at 15:02

## 2017-05-10 RX ADMIN — DULOXETINE HYDROCHLORIDE 60 MILLIGRAM(S): 30 CAPSULE, DELAYED RELEASE ORAL at 23:03

## 2017-05-10 RX ADMIN — Medication 25 MILLIGRAM(S): at 23:02

## 2017-05-10 RX ADMIN — CLOPIDOGREL BISULFATE 75 MILLIGRAM(S): 75 TABLET, FILM COATED ORAL at 21:31

## 2017-05-10 RX ADMIN — Medication 0.4 MILLIGRAM(S): at 01:47

## 2017-05-10 RX ADMIN — LISINOPRIL 40 MILLIGRAM(S): 2.5 TABLET ORAL at 16:26

## 2017-05-10 RX ADMIN — Medication 10 MILLIGRAM(S): at 21:25

## 2017-05-10 RX ADMIN — Medication 0.4 MILLIGRAM(S): at 02:42

## 2017-05-10 RX ADMIN — CINACALCET 30 MILLIGRAM(S): 30 TABLET, FILM COATED ORAL at 23:03

## 2017-05-10 RX ADMIN — HEPARIN SODIUM 5000 UNIT(S): 5000 INJECTION INTRAVENOUS; SUBCUTANEOUS at 17:11

## 2017-05-10 RX ADMIN — SEVELAMER CARBONATE 2400 MILLIGRAM(S): 2400 POWDER, FOR SUSPENSION ORAL at 18:54

## 2017-05-10 RX ADMIN — Medication 80 MILLIGRAM(S): at 01:47

## 2017-05-10 RX ADMIN — Medication 324 MILLIGRAM(S): at 01:48

## 2017-05-10 RX ADMIN — SODIUM CHLORIDE 3 MILLILITER(S): 9 INJECTION INTRAMUSCULAR; INTRAVENOUS; SUBCUTANEOUS at 01:39

## 2017-05-10 RX ADMIN — Medication 0.4 MILLIGRAM(S): at 02:49

## 2017-05-10 NOTE — H&P ADULT. - PROBLEM SELECTOR PLAN 1
Presented with SOB/orthopnea/LIPSCOMB. No pleuritic CP/CP or other cardiac/infectious symptoms. Exam and labs consistent with fluid overload; Pt reports frequently requiring an extra HD session weekly. EKG unchanged.   - Renal consulted Dr. Daisy Schmidt, appreciate recs, currently in HD at this time  - c/w BP control   - Slight trop elevated, likely demand, will trend   - supplemental O2 as needed

## 2017-05-10 NOTE — ED ADULT NURSE REASSESSMENT NOTE - GENERAL PATIENT STATE
cooperative/resting/sleeping/comfortable appearance
cooperative/resting/sleeping/comfortable appearance

## 2017-05-10 NOTE — H&P ADULT. - ASSESSMENT
61F w/CAD, CVA, DM2, HTN, HLD, and ESRD on HD Tues, Thurs, Sat presenting with SOB for 2 days found to have elevated BNP and exam c/w fluid overload admitted for HD and further evaluation.

## 2017-05-10 NOTE — PROVIDER CONTACT NOTE (OTHER) - BACKGROUND
Pt admitted for shortness of breath, Blood pressure @ 1800 208/90 Pt admitted for shortness of breath, Blood pressure @ 1800 208/90, given Lopressor 50mg, Hydralyzine 10mh, and Lisinopril 40mg @ 1700. Current B/P - 198/81, HR - 70.

## 2017-05-10 NOTE — ED PROVIDER NOTE - FAMILY HISTORY
Family history of hypertension     Family history of smoking     Sibling  Still living? Yes, Estimated age: Age Unknown  Family history of cancer, Age at diagnosis: Age Unknown

## 2017-05-10 NOTE — ED PROVIDER NOTE - OBJECTIVE STATEMENT
61 year old female hx of dm1 esrd, chf, on dialysis t R sa, no missed session presenting with sob for 2 days, getting worse, no cp, nephro said she should get extra session which she has not had yet. No fever no cough no n/v/d. No pleuritic pain. 61 year old female hx of dm1 esrd, chf, on dialysis t R sa, no missed session presenting with sob for 2 days, getting worse, no cp, nephro said she should get extra session which she has not had yet. No fever no cough no n/v/d. No pleuritic pain.    compliant with medications.

## 2017-05-10 NOTE — H&P ADULT. - LAB RESULTS AND INTERPRETATION
Personally reviewed labs. Labs notable for no leukocytosis WBC 4.1 Hb 10.4 Plt 211 BMP Cr 6.05 K 4.5 pBNP >[id number] Trop 0.13

## 2017-05-10 NOTE — ED ADULT NURSE NOTE - OBJECTIVE STATEMENT
pt BIBA and as per pt family, pt "got dialysis today and had no issues during dialysis. later today she began saying she could not breathe." pt placed on 2L NC and pt breathing improved and unlabored. +edema noted to legs B/L, left leg +2 edema and right leg +1 edema. pt denies SOB/difficulty breathing at present pt presents with insulin pump to the LLQ and ER MD Rincon made aware. ER MD Rincon contacted endocrine fellow about pt and self assessment forms provided to patient. pt BIBA and as per pt family, pt "got dialysis today and had no issues during dialysis. later today she began saying she could not breathe." pt placed on 2L NC and pt breathing improved and unlabored. +edema noted to legs B/L, left leg +2 edema and right leg +1 edema. pt denies SOB/difficulty breathing at present pt presents with insulin pump to the LLQ and ER MD Rincon made aware. ER MD Rincon contacted endocrine fellow about pt and self assessment forms provided to patient. Charge RN made aware.

## 2017-05-10 NOTE — H&P ADULT. - PROBLEM SELECTOR PLAN 3
- c/w home regimen; per renal add hydralazine 10mg BID   - Trend BP closely and adjust regimen as needed

## 2017-05-10 NOTE — ED PROVIDER NOTE - MEDICAL DECISION MAKING DETAILS
fluid overload, will give lasix and nitro. xr chest, trops, admit. -ncohen fluid overload, will give lasix and nitro. xr chest, trops, admit. -delmi Celis: patient with esrd on hd, CHF, here with palma, + orthopnea and sob. scheduled for extra dialysis tomorrow but very sob with exertion. compliant with medications. + crackles bibasilar. will get labs, lasix iv, admit, nephro paged for dialysis.

## 2017-05-10 NOTE — H&P ADULT. - RADIOLOGY RESULTS AND INTERPRETATION
Personally reviewed imaging. Imaging notable for CXR with micronodular opacities at b/l lower lung unclear if previously seen nodules.

## 2017-05-11 ENCOUNTER — TRANSCRIPTION ENCOUNTER (OUTPATIENT)
Age: 62
End: 2017-05-11

## 2017-05-11 LAB
ANION GAP SERPL CALC-SCNC: 19 MMOL/L — HIGH (ref 5–17)
BASOPHILS # BLD AUTO: 0.1 K/UL — SIGNIFICANT CHANGE UP (ref 0–0.2)
BASOPHILS NFR BLD AUTO: 1.3 % — SIGNIFICANT CHANGE UP (ref 0–2)
BUN SERPL-MCNC: 26 MG/DL — HIGH (ref 7–23)
CALCIUM SERPL-MCNC: 8.6 MG/DL — SIGNIFICANT CHANGE UP (ref 8.4–10.5)
CHLORIDE SERPL-SCNC: 94 MMOL/L — LOW (ref 96–108)
CO2 SERPL-SCNC: 23 MMOL/L — SIGNIFICANT CHANGE UP (ref 22–31)
CREAT SERPL-MCNC: 4.52 MG/DL — HIGH (ref 0.5–1.3)
EOSINOPHIL # BLD AUTO: 0.2 K/UL — SIGNIFICANT CHANGE UP (ref 0–0.5)
EOSINOPHIL NFR BLD AUTO: 5.4 % — SIGNIFICANT CHANGE UP (ref 0–6)
GLUCOSE SERPL-MCNC: 281 MG/DL — HIGH (ref 70–99)
HAV IGM SER-ACNC: SIGNIFICANT CHANGE UP
HBV CORE AB SER-ACNC: SIGNIFICANT CHANGE UP
HBV CORE IGM SER-ACNC: SIGNIFICANT CHANGE UP
HBV SURFACE AB SER-ACNC: <3 MIU/ML — LOW
HBV SURFACE AG SER-ACNC: SIGNIFICANT CHANGE UP
HCT VFR BLD CALC: 34 % — LOW (ref 34.5–45)
HCV AB S/CO SERPL IA: 0.18 S/CO — SIGNIFICANT CHANGE UP
HCV AB SERPL-IMP: SIGNIFICANT CHANGE UP
HGB BLD-MCNC: 11.1 G/DL — LOW (ref 11.5–15.5)
LYMPHOCYTES # BLD AUTO: 1.1 K/UL — SIGNIFICANT CHANGE UP (ref 1–3.3)
LYMPHOCYTES # BLD AUTO: 25.4 % — SIGNIFICANT CHANGE UP (ref 13–44)
MAGNESIUM SERPL-MCNC: 2.1 MG/DL — SIGNIFICANT CHANGE UP (ref 1.6–2.6)
MCHC RBC-ENTMCNC: 32 PG — SIGNIFICANT CHANGE UP (ref 27–34)
MCHC RBC-ENTMCNC: 32.6 GM/DL — SIGNIFICANT CHANGE UP (ref 32–36)
MCV RBC AUTO: 98.3 FL — SIGNIFICANT CHANGE UP (ref 80–100)
MONOCYTES # BLD AUTO: 0.4 K/UL — SIGNIFICANT CHANGE UP (ref 0–0.9)
MONOCYTES NFR BLD AUTO: 8.6 % — SIGNIFICANT CHANGE UP (ref 2–14)
NEUTROPHILS # BLD AUTO: 2.5 K/UL — SIGNIFICANT CHANGE UP (ref 1.8–7.4)
NEUTROPHILS NFR BLD AUTO: 59.2 % — SIGNIFICANT CHANGE UP (ref 43–77)
PHOSPHATE SERPL-MCNC: 4.2 MG/DL — SIGNIFICANT CHANGE UP (ref 2.5–4.5)
PLATELET # BLD AUTO: 252 K/UL — SIGNIFICANT CHANGE UP (ref 150–400)
POTASSIUM SERPL-MCNC: 4.4 MMOL/L — SIGNIFICANT CHANGE UP (ref 3.5–5.3)
POTASSIUM SERPL-SCNC: 4.4 MMOL/L — SIGNIFICANT CHANGE UP (ref 3.5–5.3)
RBC # BLD: 3.46 M/UL — LOW (ref 3.8–5.2)
RBC # FLD: 12.8 % — SIGNIFICANT CHANGE UP (ref 10.3–14.5)
SODIUM SERPL-SCNC: 136 MMOL/L — SIGNIFICANT CHANGE UP (ref 135–145)
WBC # BLD: 4.2 K/UL — SIGNIFICANT CHANGE UP (ref 3.8–10.5)
WBC # FLD AUTO: 4.2 K/UL — SIGNIFICANT CHANGE UP (ref 3.8–10.5)

## 2017-05-11 PROCEDURE — 99233 SBSQ HOSP IP/OBS HIGH 50: CPT | Mod: GC

## 2017-05-11 RX ORDER — HYDRALAZINE HCL 50 MG
10 TABLET ORAL ONCE
Qty: 0 | Refills: 0 | Status: COMPLETED | OUTPATIENT
Start: 2017-05-11 | End: 2017-05-11

## 2017-05-11 RX ORDER — HYDRALAZINE HCL 50 MG
50 TABLET ORAL THREE TIMES A DAY
Qty: 0 | Refills: 0 | Status: DISCONTINUED | OUTPATIENT
Start: 2017-05-11 | End: 2017-05-12

## 2017-05-11 RX ORDER — ASPIRIN/CALCIUM CARB/MAGNESIUM 324 MG
1 TABLET ORAL
Qty: 0 | Refills: 0 | COMMUNITY
Start: 2017-05-11

## 2017-05-11 RX ORDER — HYDRALAZINE HCL 50 MG
25 TABLET ORAL EVERY 8 HOURS
Qty: 0 | Refills: 0 | Status: DISCONTINUED | OUTPATIENT
Start: 2017-05-11 | End: 2017-05-11

## 2017-05-11 RX ORDER — HYDRALAZINE HCL 50 MG
5 TABLET ORAL ONCE
Qty: 0 | Refills: 0 | Status: DISCONTINUED | OUTPATIENT
Start: 2017-05-11 | End: 2017-05-13

## 2017-05-11 RX ORDER — HYDRALAZINE HCL 50 MG
25 TABLET ORAL ONCE
Qty: 0 | Refills: 0 | Status: COMPLETED | OUTPATIENT
Start: 2017-05-11 | End: 2017-05-11

## 2017-05-11 RX ORDER — HYDRALAZINE HCL 50 MG
50 TABLET ORAL ONCE
Qty: 0 | Refills: 0 | Status: COMPLETED | OUTPATIENT
Start: 2017-05-11 | End: 2017-05-11

## 2017-05-11 RX ORDER — METOPROLOL TARTRATE 50 MG
1 TABLET ORAL
Qty: 0 | Refills: 0 | COMMUNITY
Start: 2017-05-11

## 2017-05-11 RX ORDER — CLOPIDOGREL BISULFATE 75 MG/1
1 TABLET, FILM COATED ORAL
Qty: 0 | Refills: 0 | COMMUNITY
Start: 2017-05-11

## 2017-05-11 RX ORDER — LISINOPRIL 2.5 MG/1
1 TABLET ORAL
Qty: 0 | Refills: 0 | COMMUNITY
Start: 2017-05-11

## 2017-05-11 RX ORDER — DULOXETINE HYDROCHLORIDE 30 MG/1
1 CAPSULE, DELAYED RELEASE ORAL
Qty: 0 | Refills: 0 | COMMUNITY
Start: 2017-05-11

## 2017-05-11 RX ORDER — INSULIN ASPART 100 [IU]/ML
1 INJECTION, SOLUTION SUBCUTANEOUS
Qty: 0 | Refills: 0 | Status: DISCONTINUED | OUTPATIENT
Start: 2017-05-11 | End: 2017-05-12

## 2017-05-11 RX ADMIN — Medication 50 MILLIGRAM(S): at 05:59

## 2017-05-11 RX ADMIN — ATORVASTATIN CALCIUM 20 MILLIGRAM(S): 80 TABLET, FILM COATED ORAL at 21:16

## 2017-05-11 RX ADMIN — Medication 25 MILLIGRAM(S): at 17:01

## 2017-05-11 RX ADMIN — SEVELAMER CARBONATE 2400 MILLIGRAM(S): 2400 POWDER, FOR SUSPENSION ORAL at 17:01

## 2017-05-11 RX ADMIN — Medication 400 MILLIGRAM(S): at 00:26

## 2017-05-11 RX ADMIN — LISINOPRIL 40 MILLIGRAM(S): 2.5 TABLET ORAL at 05:34

## 2017-05-11 RX ADMIN — Medication 10 MILLIGRAM(S): at 05:27

## 2017-05-11 RX ADMIN — CLOPIDOGREL BISULFATE 75 MILLIGRAM(S): 75 TABLET, FILM COATED ORAL at 21:17

## 2017-05-11 RX ADMIN — Medication 25 MILLIGRAM(S): at 21:15

## 2017-05-11 RX ADMIN — Medication 650 MILLIGRAM(S): at 06:00

## 2017-05-11 RX ADMIN — Medication 50 MILLIGRAM(S): at 05:28

## 2017-05-11 RX ADMIN — HEPARIN SODIUM 5000 UNIT(S): 5000 INJECTION INTRAVENOUS; SUBCUTANEOUS at 05:28

## 2017-05-11 RX ADMIN — DULOXETINE HYDROCHLORIDE 60 MILLIGRAM(S): 30 CAPSULE, DELAYED RELEASE ORAL at 21:15

## 2017-05-11 RX ADMIN — SEVELAMER CARBONATE 2400 MILLIGRAM(S): 2400 POWDER, FOR SUSPENSION ORAL at 14:03

## 2017-05-11 RX ADMIN — Medication 25 MILLIGRAM(S): at 22:18

## 2017-05-11 RX ADMIN — HEPARIN SODIUM 5000 UNIT(S): 5000 INJECTION INTRAVENOUS; SUBCUTANEOUS at 17:01

## 2017-05-11 RX ADMIN — CINACALCET 30 MILLIGRAM(S): 30 TABLET, FILM COATED ORAL at 21:15

## 2017-05-11 RX ADMIN — SEVELAMER CARBONATE 2400 MILLIGRAM(S): 2400 POWDER, FOR SUSPENSION ORAL at 08:06

## 2017-05-11 RX ADMIN — Medication 81 MILLIGRAM(S): at 08:06

## 2017-05-11 RX ADMIN — Medication 650 MILLIGRAM(S): at 06:20

## 2017-05-11 RX ADMIN — Medication 1000 MILLIGRAM(S): at 01:01

## 2017-05-11 RX ADMIN — Medication 25 MILLIGRAM(S): at 05:28

## 2017-05-11 NOTE — DISCHARGE NOTE ADULT - MEDICATION SUMMARY - MEDICATIONS TO TAKE
I will START or STAY ON the medications listed below when I get home from the hospital:    aspirin 81 mg oral delayed release tablet  -- 1 tab(s) by mouth once a day  -- Indication: For antiplatlet     lisinopril 40 mg oral tablet  -- 1 tab(s) by mouth once a day  -- Indication: For Htn    DULoxetine 60 mg oral delayed release capsule  -- 1 cap(s) by mouth once a day (at bedtime)  -- Indication: For Depression    NovoLOG 100 units/mL subcutaneous solution  --  subcutaneous   Novolog  Insulin Pump    Basal rate start at 0000 at 0.40 units /hr  0330 at 0.50 units/hr  0600 at 0.425 units/hr   Insulin carb ratio   0000 : 9 units/gram  1100: 10 unit/gram   1500: 9 unit/gram   Insulin sensitivity factor   0000 ISF 60  0700 ISF 40  1800 ISF 60  -130 mg/dl             -- Indication: For insulin pump    atorvastatin 20 mg oral tablet  -- 1 tab(s) by mouth once a day (at bedtime)  -- Indication: For Hld    clopidogrel 75 mg oral tablet  -- 1 tab(s) by mouth once a day (at bedtime)  -- Indication: For cad    metoprolol succinate 50 mg oral tablet, extended release  -- 1 tab(s) by mouth once a day  -- Indication: For cad    furosemide 40 mg oral tablet  -- 1 tab(s) by mouth 3 times a week  Monday, wednesday and friday   -- Indication: For chf    cinacalcet 30 mg oral tablet  -- 1 tab(s) by mouth once a day (at bedtime)  -- Indication: For ESRD (end stage renal disease) on dialysis    Renvela 800 mg oral tablet  -- 3 tab(s) by mouth 3 times a day  -- Indication: For ESRD (end stage renal disease) on dialysis    hydrALAZINE 25 mg oral tablet  -- 3 tab(s) by mouth every 8 hours  -- Indication: For Htn

## 2017-05-11 NOTE — DIETITIAN INITIAL EVALUATION ADULT. - DIET TYPE
consistent carbohydrate (no snacks)/renal replacement pts:no protein restr,no conc K & phos, low sodium

## 2017-05-11 NOTE — DISCHARGE NOTE ADULT - CARE PROVIDER_API CALL
Arsalan Castro), Internal Medicine  [address] [address] [place] [address]  Phone: [phone]  Fax: [phone]    Pina Ley (SUSAN), EndocrinologyMetabDiabetes  [address] [place] [address]  Phone: [phone]  Fax: [phone]

## 2017-05-11 NOTE — DISCHARGE NOTE ADULT - PLAN OF CARE
resolved s/p HD x 2 sessions HgA1C this admission.  Make sure you get your HgA1c checked every three months.  If you take oral diabetes medications, check your blood glucose two times a day.  If you take insulin, check your blood glucose before meals and at bedtime.  It's important not to skip any meals.  Keep a log of your blood glucose results and always take it with you to your doctor appointments.  Keep a list of your current medications including injectables and over the counter medications and bring this medication list with you to all your doctor appointments.  If you have not seen your opthalmologist this year call for appointment.  Check your feet daily for redness, sores, or openings. Do not self treat. If no improvement in two days call your primary care physician for an appointment.  Low blood sugar (hypoglycemia) is a blood sugar below 70mg/dl. Check your blood sugar if you feel signs/symptoms of hypoglycemia. If your blood sugar is below 70 take 15 grams of carbohydrates (ex 4 oz of apple juice, 3-4 glucosr tablets, or 4-6 oz of regular soda) wait 15 minutes and repeat blood sugar to make sure it comes up above 70.  If your blood sugar is above 70 and you are due for a meal, have a meal.  If you are not due for a meal have a snack.  This snack helps keeps your blood sugar at a safe range. Avoid taking (NSAIDs) - (ex: Ibuprofen, Advil, Celebrex, Naprosyn)  Avoid taking any nephrotoxic agents (can harm kidneys) - Intravenous contrast for diagnostic testing, combination cold medications.  Have all medications adjusted for your renal function by your Health Care Provider.  Blood pressure control is important.  Take all medication as prescribed. Follow up with your medical doctor to establish long term blood pressure treatment goals. Weigh yourself daily.  If you gain 3lbs in 3 days, or 5lbs in a week call your Health Care Provider.  Do not eat or drink foods containing more than 2000mg of salt (sodium) in your diet every day.  Call your Health Care Provider if you have any swelling or increased swelling in your feet, ankles, and/or stomach.  Take all of your medication as directed.  If you become dizzy call your Health Care Provider. HgA1C this admission 8.5  Make sure you get your HgA1c checked every three months.  If you take oral diabetes medications, check your blood glucose two times a day.  If you take insulin, check your blood glucose before meals and at bedtime.  It's important not to skip any meals.  Keep a log of your blood glucose results and always take it with you to your doctor appointments.  Keep a list of your current medications including injectables and over the counter medications and bring this medication list with you to all your doctor appointments.  If you have not seen your opthalmologist this year call for appointment.  Check your feet daily for redness, sores, or openings. Do not self treat. If no improvement in two days call your primary care physician for an appointment.  Low blood sugar (hypoglycemia) is a blood sugar below 70mg/dl. Check your blood sugar if you feel signs/symptoms of hypoglycemia. If your blood sugar is below 70 take 15 grams of carbohydrates (ex 4 oz of apple juice, 3-4 glucosr tablets, or 4-6 oz of regular soda) wait 15 minutes and repeat blood sugar to make sure it comes up above 70.  If your blood sugar is above 70 and you are due for a meal, have a meal.  If you are not due for a meal have a snack.  This snack helps keeps your blood sugar at a safe range.

## 2017-05-11 NOTE — ADVANCED PRACTICE NURSE CONSULT - ASSESSMENT
Pt seen today by RN, STEVEE at 7:30am.  In ED pt received ASA 324mg x1, Lasix 80mg IVP and Nitrostat. Patient is A&Ox3. Multiple Microvascular and Microvascular complications, ESRD on HD, here with volume overload and dyspnea. She got HD on 05/10.  Pt has T1DM for 40 years and has been using [employer] Minimed insulin pump for 3 years.  Pt followed By Dr. ZANDRA Ley (Endocrinologist).  Pt saw Endocrinologist last Monday.  Dr. Lorenzo ([facility]) will continue home pump settings, see orders. Insulin pump site last changed 5/06/17.  Pt was due for site change on 5/9/17.  Insulin pump reservoirs’ with low alarm at current time.  The risks and complications associated with not changed insulin pump sites q3 days emphasized extensively with pt.  Insulin pump site changed today and placed in right abdomen with adequate return-demo observed. Pt knowledgeable regarding S/S, prevention and appropriate treatment of hypoglycemia (15:15 rule) and hyperglycemia.  Pt has 3 days of insulin pump supplies.  Pt verbalizes adequate understanding of all RN, CDE instructions via the teach-back method.  Current insulin pump settings as follows:     Basal  00:00: 0.40  03:30: 0.45  06:00: 0.425    ICR  00:00 – 1:9  12:00 – 1:12    ISF  00:00: 60   07:00: 40  18:00: 60    Active insulin time: 6 hours    BG Target:   00:00: 110-130 mg/dl  08:00: 100-120 mg/dl

## 2017-05-11 NOTE — ADVANCED PRACTICE NURSE CONSULT - REASON FOR CONSULT
Certified Diabetes Educator:       60 yo woman w/hx of CAD, CVA, T1DM, HTN, HLD, and ESRD on HD Tues, Thurs, and Sat presented with SOB for 2 days. She reports that she often gets an extra HD session on Wednesday every other week for the past 7 months. Last HD Tuesday and she denies any complications/issues during that session. She reported orthopnea, LIPSCOMB and slightly increased LE edema. No CP, palpitations, fevers/chills, nausea/vomiting, abdominal pain, change in bowel habits, or sick contacts. No URI symptoms. She reports that she is adherent with her medication regimen and denies any excess salt/fluid load.  Pt consulted for long standing T1DM and insulin pump therapy.

## 2017-05-11 NOTE — DISCHARGE NOTE ADULT - HOSPITAL COURSE
To be completed by attending 61F w/CAD, CVA, T1DM on Insulin pump , HTN, HLD, and ESRD on HD Tues, Thurs, Sat presenting with SOB for 2 days found to have elevated BNP and exam c/w fluid overload admitted for HD and further evaluation. 5/10 S/p dialysis, Hypertensive episode hydralazine IVP given  5//10 endocrine consult for Insulin pump  5/10 Hypertensive episodes Hydralazine IVP given and standing dose hydralazine increased to 25 BID  05/11:Hydralazine doses incresaed to 25 mg 3X ADAY   5/11 Hydralazine increased to 50mg tid sbp in 200's  5/12 ESRD on HS Tue/Thur/Sat  HTN /80- Dr Burger - increase Hydralizine to 75 mg TID  d/c planning for tomorrow  5/12 As per Dr. Burger SBP goal for sbp's 150's may need to increase to 100mg TID

## 2017-05-11 NOTE — ADVANCED PRACTICE NURSE CONSULT - RECOMMEDATIONS
Case discussed with Primary RN.  Pt reports having sufficient BG testing and insulin pump supplies at home.  Primary  RN to follow up with BG monitoring, making sure pt consumes carb consistent meals, insulin pump site assessment, monitoring S/S hypo/hyperglycemia and tracking carb intake, meal/correction boluses.

## 2017-05-11 NOTE — DIETITIAN INITIAL EVALUATION ADULT. - PERTINENT LABORATORY DATA
BUN 26, creatinine 4.52, Glucose 281, Hgba1c 8.5, Fingersticks:278,275,235. pt utilizes insulin pump.

## 2017-05-11 NOTE — DIETITIAN INITIAL EVALUATION ADULT. - OTHER INFO
consult for dialysis. admitted for shortness of breath.  pt states good intake and good understanding of diet related to diabetes and dialysis. has an insulin pump. cannot recall last BM, states short term memory is not good. NKFA

## 2017-05-11 NOTE — DISCHARGE NOTE ADULT - PATIENT PORTAL LINK FT
“You can access the [employer] Patient Portal, offered by [facility], by registering with the following website: [email or web address]”

## 2017-05-11 NOTE — DISCHARGE NOTE ADULT - CARE PLAN
Principal Discharge DX:	Shortness of breath  Goal:	resolved  Instructions for follow-up, activity and diet:	s/p HD x 2 sessions  Secondary Diagnosis:	Diabetes mellitus type 1  Instructions for follow-up, activity and diet:	HgA1C this admission.  Make sure you get your HgA1c checked every three months.  If you take oral diabetes medications, check your blood glucose two times a day.  If you take insulin, check your blood glucose before meals and at bedtime.  It's important not to skip any meals.  Keep a log of your blood glucose results and always take it with you to your doctor appointments.  Keep a list of your current medications including injectables and over the counter medications and bring this medication list with you to all your doctor appointments.  If you have not seen your opthalmologist this year call for appointment.  Check your feet daily for redness, sores, or openings. Do not self treat. If no improvement in two days call your primary care physician for an appointment.  Low blood sugar (hypoglycemia) is a blood sugar below 70mg/dl. Check your blood sugar if you feel signs/symptoms of hypoglycemia. If your blood sugar is below 70 take 15 grams of carbohydrates (ex 4 oz of apple juice, 3-4 glucosr tablets, or 4-6 oz of regular soda) wait 15 minutes and repeat blood sugar to make sure it comes up above 70.  If your blood sugar is above 70 and you are due for a meal, have a meal.  If you are not due for a meal have a snack.  This snack helps keeps your blood sugar at a safe range.  Secondary Diagnosis:	ESRD (end stage renal disease) on dialysis  Instructions for follow-up, activity and diet:	Avoid taking (NSAIDs) - (ex: Ibuprofen, Advil, Celebrex, Naprosyn)  Avoid taking any nephrotoxic agents (can harm kidneys) - Intravenous contrast for diagnostic testing, combination cold medications.  Have all medications adjusted for your renal function by your Health Care Provider.  Blood pressure control is important.  Take all medication as prescribed. Principal Discharge DX:	Shortness of breath  Goal:	resolved  Instructions for follow-up, activity and diet:	s/p HD x 2 sessions  Secondary Diagnosis:	Diabetes mellitus type 1  Instructions for follow-up, activity and diet:	HgA1C this admission.  Make sure you get your HgA1c checked every three months.  If you take oral diabetes medications, check your blood glucose two times a day.  If you take insulin, check your blood glucose before meals and at bedtime.  It's important not to skip any meals.  Keep a log of your blood glucose results and always take it with you to your doctor appointments.  Keep a list of your current medications including injectables and over the counter medications and bring this medication list with you to all your doctor appointments.  If you have not seen your opthalmologist this year call for appointment.  Check your feet daily for redness, sores, or openings. Do not self treat. If no improvement in two days call your primary care physician for an appointment.  Low blood sugar (hypoglycemia) is a blood sugar below 70mg/dl. Check your blood sugar if you feel signs/symptoms of hypoglycemia. If your blood sugar is below 70 take 15 grams of carbohydrates (ex 4 oz of apple juice, 3-4 glucosr tablets, or 4-6 oz of regular soda) wait 15 minutes and repeat blood sugar to make sure it comes up above 70.  If your blood sugar is above 70 and you are due for a meal, have a meal.  If you are not due for a meal have a snack.  This snack helps keeps your blood sugar at a safe range.  Secondary Diagnosis:	ESRD (end stage renal disease) on dialysis  Instructions for follow-up, activity and diet:	Avoid taking (NSAIDs) - (ex: Ibuprofen, Advil, Celebrex, Naprosyn)  Avoid taking any nephrotoxic agents (can harm kidneys) - Intravenous contrast for diagnostic testing, combination cold medications.  Have all medications adjusted for your renal function by your Health Care Provider.  Blood pressure control is important.  Take all medication as prescribed.  Secondary Diagnosis:	Essential hypertension  Instructions for follow-up, activity and diet:	Follow up with your medical doctor to establish long term blood pressure treatment goals. Principal Discharge DX:	Shortness of breath  Goal:	resolved  Instructions for follow-up, activity and diet:	Weigh yourself daily.  If you gain 3lbs in 3 days, or 5lbs in a week call your Health Care Provider.  Do not eat or drink foods containing more than 2000mg of salt (sodium) in your diet every day.  Call your Health Care Provider if you have any swelling or increased swelling in your feet, ankles, and/or stomach.  Take all of your medication as directed.  If you become dizzy call your Health Care Provider.  Secondary Diagnosis:	Diabetes mellitus type 1  Instructions for follow-up, activity and diet:	HgA1C this admission 8.5  Make sure you get your HgA1c checked every three months.  If you take oral diabetes medications, check your blood glucose two times a day.  If you take insulin, check your blood glucose before meals and at bedtime.  It's important not to skip any meals.  Keep a log of your blood glucose results and always take it with you to your doctor appointments.  Keep a list of your current medications including injectables and over the counter medications and bring this medication list with you to all your doctor appointments.  If you have not seen your opthalmologist this year call for appointment.  Check your feet daily for redness, sores, or openings. Do not self treat. If no improvement in two days call your primary care physician for an appointment.  Low blood sugar (hypoglycemia) is a blood sugar below 70mg/dl. Check your blood sugar if you feel signs/symptoms of hypoglycemia. If your blood sugar is below 70 take 15 grams of carbohydrates (ex 4 oz of apple juice, 3-4 glucosr tablets, or 4-6 oz of regular soda) wait 15 minutes and repeat blood sugar to make sure it comes up above 70.  If your blood sugar is above 70 and you are due for a meal, have a meal.  If you are not due for a meal have a snack.  This snack helps keeps your blood sugar at a safe range.  Secondary Diagnosis:	ESRD (end stage renal disease) on dialysis  Instructions for follow-up, activity and diet:	Avoid taking (NSAIDs) - (ex: Ibuprofen, Advil, Celebrex, Naprosyn)  Avoid taking any nephrotoxic agents (can harm kidneys) - Intravenous contrast for diagnostic testing, combination cold medications.  Have all medications adjusted for your renal function by your Health Care Provider.  Blood pressure control is important.  Take all medication as prescribed.  Secondary Diagnosis:	Essential hypertension  Instructions for follow-up, activity and diet:	Follow up with your medical doctor to establish long term blood pressure treatment goals.

## 2017-05-11 NOTE — DIETITIAN INITIAL EVALUATION ADULT. - ORAL INTAKE PTA
except that pt avoids eating vegetables and some high K+ foods because she tends to run elevated K+/good

## 2017-05-12 RX ORDER — HYDRALAZINE HCL 50 MG
50 TABLET ORAL ONCE
Qty: 0 | Refills: 0 | Status: COMPLETED | OUTPATIENT
Start: 2017-05-12 | End: 2017-05-12

## 2017-05-12 RX ORDER — HYDRALAZINE HCL 50 MG
75 TABLET ORAL EVERY 8 HOURS
Qty: 0 | Refills: 0 | Status: DISCONTINUED | OUTPATIENT
Start: 2017-05-12 | End: 2017-05-13

## 2017-05-12 RX ORDER — INSULIN ASPART 100 [IU]/ML
1 INJECTION, SOLUTION SUBCUTANEOUS
Qty: 0 | Refills: 0 | Status: DISCONTINUED | OUTPATIENT
Start: 2017-05-12 | End: 2017-05-13

## 2017-05-12 RX ADMIN — CINACALCET 30 MILLIGRAM(S): 30 TABLET, FILM COATED ORAL at 22:04

## 2017-05-12 RX ADMIN — INSULIN ASPART 1 EACH: 100 INJECTION, SOLUTION SUBCUTANEOUS at 09:43

## 2017-05-12 RX ADMIN — ATORVASTATIN CALCIUM 20 MILLIGRAM(S): 80 TABLET, FILM COATED ORAL at 22:04

## 2017-05-12 RX ADMIN — Medication 81 MILLIGRAM(S): at 18:06

## 2017-05-12 RX ADMIN — Medication 50 MILLIGRAM(S): at 05:50

## 2017-05-12 RX ADMIN — Medication 75 MILLIGRAM(S): at 18:10

## 2017-05-12 RX ADMIN — HEPARIN SODIUM 5000 UNIT(S): 5000 INJECTION INTRAVENOUS; SUBCUTANEOUS at 18:06

## 2017-05-12 RX ADMIN — SEVELAMER CARBONATE 2400 MILLIGRAM(S): 2400 POWDER, FOR SUSPENSION ORAL at 09:43

## 2017-05-12 RX ADMIN — DULOXETINE HYDROCHLORIDE 60 MILLIGRAM(S): 30 CAPSULE, DELAYED RELEASE ORAL at 22:04

## 2017-05-12 RX ADMIN — HEPARIN SODIUM 5000 UNIT(S): 5000 INJECTION INTRAVENOUS; SUBCUTANEOUS at 05:49

## 2017-05-12 RX ADMIN — Medication 50 MILLIGRAM(S): at 05:49

## 2017-05-12 RX ADMIN — LISINOPRIL 40 MILLIGRAM(S): 2.5 TABLET ORAL at 05:50

## 2017-05-12 RX ADMIN — CLOPIDOGREL BISULFATE 75 MILLIGRAM(S): 75 TABLET, FILM COATED ORAL at 22:04

## 2017-05-12 RX ADMIN — SEVELAMER CARBONATE 2400 MILLIGRAM(S): 2400 POWDER, FOR SUSPENSION ORAL at 17:49

## 2017-05-12 RX ADMIN — Medication 50 MILLIGRAM(S): at 22:04

## 2017-05-12 RX ADMIN — Medication 40 MILLIGRAM(S): at 10:15

## 2017-05-13 VITALS
HEART RATE: 80 BPM | OXYGEN SATURATION: 97 % | DIASTOLIC BLOOD PRESSURE: 78 MMHG | TEMPERATURE: 98 F | RESPIRATION RATE: 18 BRPM | SYSTOLIC BLOOD PRESSURE: 167 MMHG

## 2017-05-13 PROCEDURE — 86803 HEPATITIS C AB TEST: CPT

## 2017-05-13 PROCEDURE — 80048 BASIC METABOLIC PNL TOTAL CA: CPT

## 2017-05-13 PROCEDURE — 84484 ASSAY OF TROPONIN QUANT: CPT

## 2017-05-13 PROCEDURE — 86704 HEP B CORE ANTIBODY TOTAL: CPT

## 2017-05-13 PROCEDURE — 80053 COMPREHEN METABOLIC PANEL: CPT

## 2017-05-13 PROCEDURE — 99261: CPT

## 2017-05-13 PROCEDURE — 83880 ASSAY OF NATRIURETIC PEPTIDE: CPT

## 2017-05-13 PROCEDURE — 86705 HEP B CORE ANTIBODY IGM: CPT

## 2017-05-13 PROCEDURE — 84100 ASSAY OF PHOSPHORUS: CPT

## 2017-05-13 PROCEDURE — 96374 THER/PROPH/DIAG INJ IV PUSH: CPT

## 2017-05-13 PROCEDURE — 83735 ASSAY OF MAGNESIUM: CPT

## 2017-05-13 PROCEDURE — 82550 ASSAY OF CK (CPK): CPT

## 2017-05-13 PROCEDURE — 82553 CREATINE MB FRACTION: CPT

## 2017-05-13 PROCEDURE — 85730 THROMBOPLASTIN TIME PARTIAL: CPT

## 2017-05-13 PROCEDURE — 86709 HEPATITIS A IGM ANTIBODY: CPT

## 2017-05-13 PROCEDURE — 71045 X-RAY EXAM CHEST 1 VIEW: CPT

## 2017-05-13 PROCEDURE — 85610 PROTHROMBIN TIME: CPT

## 2017-05-13 PROCEDURE — 86706 HEP B SURFACE ANTIBODY: CPT

## 2017-05-13 PROCEDURE — 85027 COMPLETE CBC AUTOMATED: CPT

## 2017-05-13 PROCEDURE — 93005 ELECTROCARDIOGRAM TRACING: CPT

## 2017-05-13 PROCEDURE — 87340 HEPATITIS B SURFACE AG IA: CPT

## 2017-05-13 PROCEDURE — 99285 EMERGENCY DEPT VISIT HI MDM: CPT | Mod: 25

## 2017-05-13 RX ORDER — METOPROLOL TARTRATE 50 MG
1 TABLET ORAL
Qty: 0 | Refills: 0 | COMMUNITY

## 2017-05-13 RX ORDER — AMLODIPINE BESYLATE 2.5 MG/1
5 TABLET ORAL ONCE
Qty: 0 | Refills: 0 | Status: COMPLETED | OUTPATIENT
Start: 2017-05-13 | End: 2017-05-13

## 2017-05-13 RX ORDER — DULOXETINE HYDROCHLORIDE 30 MG/1
1 CAPSULE, DELAYED RELEASE ORAL
Qty: 0 | Refills: 0 | COMMUNITY

## 2017-05-13 RX ORDER — CINACALCET 30 MG/1
1 TABLET, FILM COATED ORAL
Qty: 0 | Refills: 0 | COMMUNITY

## 2017-05-13 RX ORDER — INSULIN ASPART 100 [IU]/ML
0 INJECTION, SOLUTION SUBCUTANEOUS
Qty: 0 | Refills: 0 | COMMUNITY
Start: 2017-05-13

## 2017-05-13 RX ORDER — HYDRALAZINE HCL 50 MG
3 TABLET ORAL
Qty: 270 | Refills: 0 | OUTPATIENT
Start: 2017-05-13 | End: 2017-06-12

## 2017-05-13 RX ORDER — LISINOPRIL 2.5 MG/1
1 TABLET ORAL
Qty: 0 | Refills: 0 | COMMUNITY

## 2017-05-13 RX ORDER — CLOPIDOGREL BISULFATE 75 MG/1
1 TABLET, FILM COATED ORAL
Qty: 0 | Refills: 0 | COMMUNITY

## 2017-05-13 RX ORDER — FUROSEMIDE 40 MG
1 TABLET ORAL
Qty: 0 | Refills: 0 | COMMUNITY

## 2017-05-13 RX ORDER — INSULIN ASPART 100 [IU]/ML
0 INJECTION, SOLUTION SUBCUTANEOUS
Qty: 0 | Refills: 0 | COMMUNITY

## 2017-05-13 RX ORDER — CINACALCET 30 MG/1
1 TABLET, FILM COATED ORAL
Qty: 0 | Refills: 0 | COMMUNITY
Start: 2017-05-13

## 2017-05-13 RX ORDER — ASPIRIN/CALCIUM CARB/MAGNESIUM 324 MG
1 TABLET ORAL
Qty: 0 | Refills: 0 | COMMUNITY

## 2017-05-13 RX ORDER — HYDRALAZINE HCL 50 MG
5 TABLET ORAL ONCE
Qty: 0 | Refills: 0 | Status: COMPLETED | OUTPATIENT
Start: 2017-05-13 | End: 2017-05-13

## 2017-05-13 RX ORDER — FUROSEMIDE 40 MG
1 TABLET ORAL
Qty: 0 | Refills: 0 | COMMUNITY
Start: 2017-05-13

## 2017-05-13 RX ADMIN — LISINOPRIL 40 MILLIGRAM(S): 2.5 TABLET ORAL at 05:22

## 2017-05-13 RX ADMIN — AMLODIPINE BESYLATE 5 MILLIGRAM(S): 2.5 TABLET ORAL at 01:07

## 2017-05-13 RX ADMIN — Medication 5 MILLIGRAM(S): at 01:06

## 2017-05-13 RX ADMIN — HEPARIN SODIUM 5000 UNIT(S): 5000 INJECTION INTRAVENOUS; SUBCUTANEOUS at 05:22

## 2017-05-13 RX ADMIN — Medication 75 MILLIGRAM(S): at 14:25

## 2017-05-13 RX ADMIN — Medication 50 MILLIGRAM(S): at 12:35

## 2017-05-13 RX ADMIN — SEVELAMER CARBONATE 2400 MILLIGRAM(S): 2400 POWDER, FOR SUSPENSION ORAL at 12:36

## 2017-05-13 RX ADMIN — Medication 81 MILLIGRAM(S): at 12:36

## 2017-05-13 RX ADMIN — Medication 75 MILLIGRAM(S): at 06:37

## 2017-05-13 NOTE — PROVIDER CONTACT NOTE (OTHER) - ACTION/TREATMENT ORDERED:
NP Ordered Hydralazine 5mg IVP, Amlodipine 5 mg PO, RN adminsitered Hydralazine and Amlodipine. BP at 141 152/68 HR 68, will continue to monitor.

## 2017-05-13 NOTE — PROVIDER CONTACT NOTE (OTHER) - BACKGROUND
Pt admitted for Shortness of Breath, PMH of ACS, Essential hypertension, LAVF, Gout, DM1, Hyperlipdemia.

## 2017-05-22 ENCOUNTER — INPATIENT (INPATIENT)
Facility: HOSPITAL | Age: 62
LOS: 1 days | Discharge: HOME | DRG: 291 | End: 2017-05-24
Attending: INTERNAL MEDICINE | Admitting: INTERNAL MEDICINE
Payer: MEDICARE

## 2017-05-22 VITALS
SYSTOLIC BLOOD PRESSURE: 181 MMHG | TEMPERATURE: 97 F | RESPIRATION RATE: 16 BRPM | OXYGEN SATURATION: 97 % | HEART RATE: 71 BPM | DIASTOLIC BLOOD PRESSURE: 70 MMHG

## 2017-05-22 DIAGNOSIS — R07.9 CHEST PAIN, UNSPECIFIED: ICD-10-CM

## 2017-05-22 DIAGNOSIS — R06.02 SHORTNESS OF BREATH: ICD-10-CM

## 2017-05-22 DIAGNOSIS — Z98.89 OTHER SPECIFIED POSTPROCEDURAL STATES: Chronic | ICD-10-CM

## 2017-05-22 DIAGNOSIS — E10.9 TYPE 1 DIABETES MELLITUS WITHOUT COMPLICATIONS: ICD-10-CM

## 2017-05-22 DIAGNOSIS — N18.6 END STAGE RENAL DISEASE: ICD-10-CM

## 2017-05-22 LAB
ALBUMIN SERPL ELPH-MCNC: 4.1 G/DL — SIGNIFICANT CHANGE UP (ref 3.3–5)
ALP SERPL-CCNC: 254 U/L — HIGH (ref 40–120)
ALT FLD-CCNC: 11 U/L RC — SIGNIFICANT CHANGE UP (ref 10–45)
ANION GAP SERPL CALC-SCNC: 18 MMOL/L — HIGH (ref 5–17)
AST SERPL-CCNC: 21 U/L — SIGNIFICANT CHANGE UP (ref 10–40)
BASOPHILS # BLD AUTO: 0.1 K/UL — SIGNIFICANT CHANGE UP (ref 0–0.2)
BASOPHILS NFR BLD AUTO: 1 % — SIGNIFICANT CHANGE UP (ref 0–2)
BILIRUB SERPL-MCNC: 0.3 MG/DL — SIGNIFICANT CHANGE UP (ref 0.2–1.2)
BUN SERPL-MCNC: 53 MG/DL — HIGH (ref 7–23)
CALCIUM SERPL-MCNC: 8.3 MG/DL — LOW (ref 8.4–10.5)
CHLORIDE SERPL-SCNC: 92 MMOL/L — LOW (ref 96–108)
CK MB BLD-MCNC: 1.3 % — SIGNIFICANT CHANGE UP (ref 0–3.5)
CK MB CFR SERPL CALC: 4.3 NG/ML — HIGH (ref 0–3.8)
CK SERPL-CCNC: 328 U/L — HIGH (ref 25–170)
CO2 SERPL-SCNC: 30 MMOL/L — SIGNIFICANT CHANGE UP (ref 22–31)
CREAT SERPL-MCNC: 7.18 MG/DL — HIGH (ref 0.5–1.3)
EOSINOPHIL # BLD AUTO: 0.2 K/UL — SIGNIFICANT CHANGE UP (ref 0–0.5)
EOSINOPHIL NFR BLD AUTO: 3.1 % — SIGNIFICANT CHANGE UP (ref 0–6)
GAS PNL BLDV: SIGNIFICANT CHANGE UP
GLUCOSE SERPL-MCNC: 78 MG/DL — SIGNIFICANT CHANGE UP (ref 70–99)
HCT VFR BLD CALC: 32 % — LOW (ref 34.5–45)
HGB BLD-MCNC: 10.4 G/DL — LOW (ref 11.5–15.5)
LYMPHOCYTES # BLD AUTO: 1.6 K/UL — SIGNIFICANT CHANGE UP (ref 1–3.3)
LYMPHOCYTES # BLD AUTO: 24.4 % — SIGNIFICANT CHANGE UP (ref 13–44)
MCHC RBC-ENTMCNC: 32.6 GM/DL — SIGNIFICANT CHANGE UP (ref 32–36)
MCHC RBC-ENTMCNC: 32.8 PG — SIGNIFICANT CHANGE UP (ref 27–34)
MCV RBC AUTO: 101 FL — HIGH (ref 80–100)
MONOCYTES # BLD AUTO: 0.6 K/UL — SIGNIFICANT CHANGE UP (ref 0–0.9)
MONOCYTES NFR BLD AUTO: 8.9 % — SIGNIFICANT CHANGE UP (ref 2–14)
NEUTROPHILS # BLD AUTO: 4 K/UL — SIGNIFICANT CHANGE UP (ref 1.8–7.4)
NEUTROPHILS NFR BLD AUTO: 62.6 % — SIGNIFICANT CHANGE UP (ref 43–77)
NT-PROBNP SERPL-SCNC: HIGH PG/ML (ref 0–300)
PLATELET # BLD AUTO: 265 K/UL — SIGNIFICANT CHANGE UP (ref 150–400)
POTASSIUM SERPL-MCNC: 4.8 MMOL/L — SIGNIFICANT CHANGE UP (ref 3.5–5.3)
POTASSIUM SERPL-SCNC: 4.8 MMOL/L — SIGNIFICANT CHANGE UP (ref 3.5–5.3)
PROT SERPL-MCNC: 7.4 G/DL — SIGNIFICANT CHANGE UP (ref 6–8.3)
RBC # BLD: 3.18 M/UL — LOW (ref 3.8–5.2)
RBC # FLD: 13.2 % — SIGNIFICANT CHANGE UP (ref 10.3–14.5)
SODIUM SERPL-SCNC: 140 MMOL/L — SIGNIFICANT CHANGE UP (ref 135–145)
TROPONIN T SERPL-MCNC: 0.11 NG/ML — HIGH (ref 0–0.06)
WBC # BLD: 6.3 K/UL — SIGNIFICANT CHANGE UP (ref 3.8–10.5)
WBC # FLD AUTO: 6.3 K/UL — SIGNIFICANT CHANGE UP (ref 3.8–10.5)

## 2017-05-22 PROCEDURE — 99285 EMERGENCY DEPT VISIT HI MDM: CPT | Mod: 25,GC

## 2017-05-22 PROCEDURE — 93010 ELECTROCARDIOGRAM REPORT: CPT

## 2017-05-22 PROCEDURE — 99223 1ST HOSP IP/OBS HIGH 75: CPT

## 2017-05-22 PROCEDURE — 71010: CPT | Mod: 26

## 2017-05-22 RX ORDER — SEVELAMER CARBONATE 2400 MG/1
800 POWDER, FOR SUSPENSION ORAL
Qty: 0 | Refills: 0 | Status: DISCONTINUED | OUTPATIENT
Start: 2017-05-22 | End: 2017-05-23

## 2017-05-22 RX ORDER — SODIUM CHLORIDE 9 MG/ML
3 INJECTION INTRAMUSCULAR; INTRAVENOUS; SUBCUTANEOUS ONCE
Qty: 0 | Refills: 0 | Status: COMPLETED | OUTPATIENT
Start: 2017-05-22 | End: 2017-05-22

## 2017-05-22 RX ORDER — METOPROLOL TARTRATE 50 MG
50 TABLET ORAL DAILY
Qty: 0 | Refills: 0 | Status: DISCONTINUED | OUTPATIENT
Start: 2017-05-22 | End: 2017-05-24

## 2017-05-22 RX ORDER — GLUCAGON INJECTION, SOLUTION 0.5 MG/.1ML
1 INJECTION, SOLUTION SUBCUTANEOUS ONCE
Qty: 0 | Refills: 0 | Status: DISCONTINUED | OUTPATIENT
Start: 2017-05-22 | End: 2017-05-24

## 2017-05-22 RX ORDER — LISINOPRIL 2.5 MG/1
40 TABLET ORAL DAILY
Qty: 0 | Refills: 0 | Status: DISCONTINUED | OUTPATIENT
Start: 2017-05-22 | End: 2017-05-24

## 2017-05-22 RX ORDER — CLOPIDOGREL BISULFATE 75 MG/1
75 TABLET, FILM COATED ORAL AT BEDTIME
Qty: 0 | Refills: 0 | Status: DISCONTINUED | OUTPATIENT
Start: 2017-05-22 | End: 2017-05-24

## 2017-05-22 RX ORDER — DULOXETINE HYDROCHLORIDE 30 MG/1
60 CAPSULE, DELAYED RELEASE ORAL DAILY
Qty: 0 | Refills: 0 | Status: DISCONTINUED | OUTPATIENT
Start: 2017-05-22 | End: 2017-05-24

## 2017-05-22 RX ORDER — CINACALCET 30 MG/1
30 TABLET, FILM COATED ORAL AT BEDTIME
Qty: 0 | Refills: 0 | Status: DISCONTINUED | OUTPATIENT
Start: 2017-05-22 | End: 2017-05-24

## 2017-05-22 RX ORDER — SODIUM CHLORIDE 9 MG/ML
1000 INJECTION, SOLUTION INTRAVENOUS
Qty: 0 | Refills: 0 | Status: DISCONTINUED | OUTPATIENT
Start: 2017-05-22 | End: 2017-05-24

## 2017-05-22 RX ORDER — DEXTROSE 50 % IN WATER 50 %
25 SYRINGE (ML) INTRAVENOUS ONCE
Qty: 0 | Refills: 0 | Status: DISCONTINUED | OUTPATIENT
Start: 2017-05-22 | End: 2017-05-24

## 2017-05-22 RX ORDER — ATORVASTATIN CALCIUM 80 MG/1
20 TABLET, FILM COATED ORAL AT BEDTIME
Qty: 0 | Refills: 0 | Status: DISCONTINUED | OUTPATIENT
Start: 2017-05-22 | End: 2017-05-24

## 2017-05-22 RX ORDER — DEXTROSE 50 % IN WATER 50 %
1 SYRINGE (ML) INTRAVENOUS ONCE
Qty: 0 | Refills: 0 | Status: DISCONTINUED | OUTPATIENT
Start: 2017-05-22 | End: 2017-05-24

## 2017-05-22 RX ORDER — HEPARIN SODIUM 5000 [USP'U]/ML
5000 INJECTION INTRAVENOUS; SUBCUTANEOUS EVERY 12 HOURS
Qty: 0 | Refills: 0 | Status: DISCONTINUED | OUTPATIENT
Start: 2017-05-22 | End: 2017-05-24

## 2017-05-22 RX ORDER — OXYCODONE HYDROCHLORIDE 5 MG/1
5 TABLET ORAL ONCE
Qty: 0 | Refills: 0 | Status: DISCONTINUED | OUTPATIENT
Start: 2017-05-22 | End: 2017-05-22

## 2017-05-22 RX ORDER — INSULIN ASPART 100 [IU]/ML
1 INJECTION, SOLUTION SUBCUTANEOUS
Qty: 0 | Refills: 0 | Status: DISCONTINUED | OUTPATIENT
Start: 2017-05-22 | End: 2017-05-24

## 2017-05-22 RX ORDER — FUROSEMIDE 40 MG
40 TABLET ORAL DAILY
Qty: 0 | Refills: 0 | Status: DISCONTINUED | OUTPATIENT
Start: 2017-05-22 | End: 2017-05-24

## 2017-05-22 RX ORDER — HYDRALAZINE HCL 50 MG
75 TABLET ORAL EVERY 8 HOURS
Qty: 0 | Refills: 0 | Status: DISCONTINUED | OUTPATIENT
Start: 2017-05-22 | End: 2017-05-24

## 2017-05-22 RX ORDER — ASPIRIN/CALCIUM CARB/MAGNESIUM 324 MG
324 TABLET ORAL ONCE
Qty: 0 | Refills: 0 | Status: COMPLETED | OUTPATIENT
Start: 2017-05-22 | End: 2017-05-22

## 2017-05-22 RX ORDER — DEXTROSE 50 % IN WATER 50 %
12.5 SYRINGE (ML) INTRAVENOUS ONCE
Qty: 0 | Refills: 0 | Status: DISCONTINUED | OUTPATIENT
Start: 2017-05-22 | End: 2017-05-24

## 2017-05-22 RX ORDER — ASPIRIN/CALCIUM CARB/MAGNESIUM 324 MG
81 TABLET ORAL DAILY
Qty: 0 | Refills: 0 | Status: DISCONTINUED | OUTPATIENT
Start: 2017-05-22 | End: 2017-05-24

## 2017-05-22 RX ADMIN — Medication 324 MILLIGRAM(S): at 06:09

## 2017-05-22 RX ADMIN — DULOXETINE HYDROCHLORIDE 60 MILLIGRAM(S): 30 CAPSULE, DELAYED RELEASE ORAL at 23:53

## 2017-05-22 RX ADMIN — HEPARIN SODIUM 5000 UNIT(S): 5000 INJECTION INTRAVENOUS; SUBCUTANEOUS at 22:08

## 2017-05-22 RX ADMIN — SODIUM CHLORIDE 3 MILLILITER(S): 9 INJECTION INTRAMUSCULAR; INTRAVENOUS; SUBCUTANEOUS at 04:36

## 2017-05-22 RX ADMIN — Medication 50 MILLIGRAM(S): at 22:10

## 2017-05-22 RX ADMIN — OXYCODONE HYDROCHLORIDE 5 MILLIGRAM(S): 5 TABLET ORAL at 23:25

## 2017-05-22 RX ADMIN — Medication 75 MILLIGRAM(S): at 22:08

## 2017-05-22 RX ADMIN — CINACALCET 30 MILLIGRAM(S): 30 TABLET, FILM COATED ORAL at 22:09

## 2017-05-22 RX ADMIN — ATORVASTATIN CALCIUM 20 MILLIGRAM(S): 80 TABLET, FILM COATED ORAL at 22:08

## 2017-05-22 RX ADMIN — OXYCODONE HYDROCHLORIDE 5 MILLIGRAM(S): 5 TABLET ORAL at 22:54

## 2017-05-22 RX ADMIN — Medication 40 MILLIGRAM(S): at 22:10

## 2017-05-22 RX ADMIN — CLOPIDOGREL BISULFATE 75 MILLIGRAM(S): 75 TABLET, FILM COATED ORAL at 22:09

## 2017-05-22 NOTE — ED PROVIDER NOTE - CARE PLAN
Principal Discharge DX:	SOB (shortness of breath) Principal Discharge DX:	SOB (shortness of breath)  Secondary Diagnosis:	Chest pain

## 2017-05-22 NOTE — ED ADULT NURSE REASSESSMENT NOTE - NS ED NURSE REASSESS COMMENT FT1
Report given by Arthur Ramos in dialysis, she received a bed on 3 DSU report given to Janis and pt is due to go straight to floor following dialysis
Pt checked own blood sugar and administered bolus from insulin pump.  .  Pt is A&Ox4, skin warm and dry, no complaints at this time.  Report given to Jason in Dialysis.
Pt sent to Dialysis, no cp, sob, nausea, or vomiting, skin warm and dry, VS per flowsheet.

## 2017-05-22 NOTE — ED PROVIDER NOTE - OBJECTIVE STATEMENT
61 year old female hx of dm1 with insulin pump, ACSs/p cath  esrd, chf, on dialysis T/TH/SAT sometimes on Wednesday c/o progressive dyspnea x 2 days. Pt reports worsening sob today while sleeping. Denies any fever, chills, cp ,ab pain,  n/v/d, urinary sx.

## 2017-05-22 NOTE — ED PROVIDER NOTE - PHYSICAL EXAMINATION
Attending MD Wright: A & O x 3, NAD, HEENT WNL and no facial asymmetry; lungs CTAB, heart with reg rhythm without murmur; abdomen soft NTND; extremities 2+ nonpitting edema of left calf; neuro exam non focal with no motor or sensory deficits.

## 2017-05-22 NOTE — ED PROVIDER NOTE - ATTENDING CONTRIBUTION TO CARE
Attending MD Wright:  I personally have seen and examined this patient.  Resident note reviewed and agree on plan of care and except where noted.  See HPI, PE, and MDM for details.     61F with ESRD on HD, CAD with stents, HTN presenting with transient chest pressure and dyspnea that woke her from sleep, asymptomatic now, EKG without overt ischemia on arrival but ACS a high consideration for this patient, will obtain cardiac biomarkers, CXR, tele monitoring, will need admission for ACS r/o

## 2017-05-22 NOTE — ED PROVIDER NOTE - MEDICAL DECISION MAKING DETAILS
62 y/o F with sig cardiac hx , chf/esrd on dialysis , dc approx 2 weeks ago c/o sob progressing x 2 days. Pt hemodyn stable in not resp distress. PE bibasilar crackles b/l . Plan to obtain ekg, labs, and reassess

## 2017-05-23 LAB
ANION GAP SERPL CALC-SCNC: 14 MMOL/L — SIGNIFICANT CHANGE UP (ref 5–17)
BUN SERPL-MCNC: 23 MG/DL — SIGNIFICANT CHANGE UP (ref 7–23)
CALCIUM SERPL-MCNC: 8 MG/DL — LOW (ref 8.4–10.5)
CHLORIDE SERPL-SCNC: 95 MMOL/L — LOW (ref 96–108)
CK MB BLD-MCNC: 1.6 % — SIGNIFICANT CHANGE UP (ref 0–3.5)
CK MB CFR SERPL CALC: 3.9 NG/ML — HIGH (ref 0–3.8)
CK SERPL-CCNC: 245 U/L — HIGH (ref 25–170)
CO2 SERPL-SCNC: 28 MMOL/L — SIGNIFICANT CHANGE UP (ref 22–31)
CREAT SERPL-MCNC: 4.25 MG/DL — HIGH (ref 0.5–1.3)
GLUCOSE SERPL-MCNC: 139 MG/DL — HIGH (ref 70–99)
HCT VFR BLD CALC: 29.3 % — LOW (ref 34.5–45)
HGB BLD-MCNC: 9.8 G/DL — LOW (ref 11.5–15.5)
MCHC RBC-ENTMCNC: 33.5 GM/DL — SIGNIFICANT CHANGE UP (ref 32–36)
MCHC RBC-ENTMCNC: 33.5 PG — SIGNIFICANT CHANGE UP (ref 27–34)
MCV RBC AUTO: 99.8 FL — SIGNIFICANT CHANGE UP (ref 80–100)
PLATELET # BLD AUTO: 209 K/UL — SIGNIFICANT CHANGE UP (ref 150–400)
POTASSIUM SERPL-MCNC: 5 MMOL/L — SIGNIFICANT CHANGE UP (ref 3.5–5.3)
POTASSIUM SERPL-SCNC: 5 MMOL/L — SIGNIFICANT CHANGE UP (ref 3.5–5.3)
RBC # BLD: 2.94 M/UL — LOW (ref 3.8–5.2)
RBC # FLD: 12.8 % — SIGNIFICANT CHANGE UP (ref 10.3–14.5)
SODIUM SERPL-SCNC: 137 MMOL/L — SIGNIFICANT CHANGE UP (ref 135–145)
TROPONIN T SERPL-MCNC: 0.08 NG/ML — HIGH (ref 0–0.06)
TROPONIN T SERPL-MCNC: 0.1 NG/ML — HIGH (ref 0–0.06)
WBC # BLD: 5.3 K/UL — SIGNIFICANT CHANGE UP (ref 3.8–10.5)
WBC # FLD AUTO: 5.3 K/UL — SIGNIFICANT CHANGE UP (ref 3.8–10.5)

## 2017-05-23 PROCEDURE — 99233 SBSQ HOSP IP/OBS HIGH 50: CPT

## 2017-05-23 RX ORDER — ACETAMINOPHEN 500 MG
650 TABLET ORAL ONCE
Qty: 0 | Refills: 0 | Status: COMPLETED | OUTPATIENT
Start: 2017-05-23 | End: 2017-05-23

## 2017-05-23 RX ORDER — ERYTHROPOIETIN 10000 [IU]/ML
10000 INJECTION, SOLUTION INTRAVENOUS; SUBCUTANEOUS ONCE
Qty: 0 | Refills: 0 | Status: COMPLETED | OUTPATIENT
Start: 2017-05-23 | End: 2017-05-24

## 2017-05-23 RX ORDER — OXYCODONE HYDROCHLORIDE 5 MG/1
5 TABLET ORAL ONCE
Qty: 0 | Refills: 0 | Status: DISCONTINUED | OUTPATIENT
Start: 2017-05-23 | End: 2017-05-24

## 2017-05-23 RX ORDER — SEVELAMER CARBONATE 2400 MG/1
2400 POWDER, FOR SUSPENSION ORAL
Qty: 0 | Refills: 0 | Status: DISCONTINUED | OUTPATIENT
Start: 2017-05-23 | End: 2017-05-24

## 2017-05-23 RX ADMIN — Medication 650 MILLIGRAM(S): at 08:54

## 2017-05-23 RX ADMIN — DULOXETINE HYDROCHLORIDE 60 MILLIGRAM(S): 30 CAPSULE, DELAYED RELEASE ORAL at 11:16

## 2017-05-23 RX ADMIN — Medication 81 MILLIGRAM(S): at 11:16

## 2017-05-23 RX ADMIN — Medication 1 TABLET(S): at 11:16

## 2017-05-23 RX ADMIN — HEPARIN SODIUM 5000 UNIT(S): 5000 INJECTION INTRAVENOUS; SUBCUTANEOUS at 11:01

## 2017-05-23 RX ADMIN — Medication 50 MILLIGRAM(S): at 21:22

## 2017-05-23 RX ADMIN — Medication 650 MILLIGRAM(S): at 07:49

## 2017-05-23 RX ADMIN — CLOPIDOGREL BISULFATE 75 MILLIGRAM(S): 75 TABLET, FILM COATED ORAL at 21:22

## 2017-05-23 RX ADMIN — Medication 75 MILLIGRAM(S): at 13:04

## 2017-05-23 RX ADMIN — Medication 75 MILLIGRAM(S): at 21:22

## 2017-05-23 RX ADMIN — SEVELAMER CARBONATE 2400 MILLIGRAM(S): 2400 POWDER, FOR SUSPENSION ORAL at 13:04

## 2017-05-23 RX ADMIN — SEVELAMER CARBONATE 800 MILLIGRAM(S): 2400 POWDER, FOR SUSPENSION ORAL at 09:06

## 2017-05-23 RX ADMIN — ATORVASTATIN CALCIUM 20 MILLIGRAM(S): 80 TABLET, FILM COATED ORAL at 21:23

## 2017-05-23 RX ADMIN — Medication 75 MILLIGRAM(S): at 05:53

## 2017-05-23 RX ADMIN — Medication 40 MILLIGRAM(S): at 21:23

## 2017-05-23 RX ADMIN — SEVELAMER CARBONATE 2400 MILLIGRAM(S): 2400 POWDER, FOR SUSPENSION ORAL at 17:59

## 2017-05-23 RX ADMIN — CINACALCET 30 MILLIGRAM(S): 30 TABLET, FILM COATED ORAL at 21:23

## 2017-05-23 RX ADMIN — LISINOPRIL 40 MILLIGRAM(S): 2.5 TABLET ORAL at 19:40

## 2017-05-23 NOTE — ADVANCED PRACTICE NURSE CONSULT - ASSESSMENT
Pt reports feeling better today.   Pt ambulating freely and denies dyspnea  dizziness, SOB, HA, chest pain.  Pt last changed insulin pump infusion set 2 days and due to change infusion set tomorrow.  Insulin pump site remains clean, dry, and intact and located on left abdomen.  Pt has 3 days of insulin pump supplies.  DSME provided regarding S/S, prevention and appropriate treatment of hypoglycemia and hyperglycemia and ESRD diet guidelines with diabetes with verbalized understanding obtained via the teach-back method.  Current insulin pump rates as follows:     Basal rates:    0000: 0.40 units /hr  0330: 0.50 units/hr  0600: 0.425 units/hr   	  Insulin Carb Ratios   0000 : 9 units/gram  1100: 10 unit/gram   1500: 9 unit/gram   	  Insulin sensitivity factor   0000 ISF 60  0700 ISF 40  1800 ISF 60    Blood Glucose Target  110-130 mg/dl     Active Insulin Time  6 hours

## 2017-05-23 NOTE — ADVANCED PRACTICE NURSE CONSULT - REASON FOR CONSULT
Certified Diabetes Educator   61 year old female hx of T1DM with insulin pump, ACSs/p cath  esrd, chf, on dialysis T/TH/SAT sometimes on Wednesday.  Pt came in for C/O progressive dyspnea x 2 days. Pt also with C/O worsening sob while sleeping. Denies any fever, chills, cp ,ab pain,  n/v/d, urinary sx.  Pt consulted for longstanding T1DM and insulin pump therapy.

## 2017-05-23 NOTE — ADVANCED PRACTICE NURSE CONSULT - RECOMMEDATIONS
Pt has sufficient amount BG testing, insulin pump supplies at home.  Primary RN to f/u with BG monitoring, making sure pt consumes carb consistent meals, insulin pump site assessment, monitoring S/S hypo/hyperglycemia and tracking carb intake, meal/correction boluses.

## 2017-05-24 VITALS
SYSTOLIC BLOOD PRESSURE: 169 MMHG | OXYGEN SATURATION: 97 % | TEMPERATURE: 98 F | DIASTOLIC BLOOD PRESSURE: 66 MMHG | RESPIRATION RATE: 18 BRPM | HEART RATE: 78 BPM

## 2017-05-24 LAB
ANION GAP SERPL CALC-SCNC: 15 MMOL/L — SIGNIFICANT CHANGE UP (ref 5–17)
BUN SERPL-MCNC: 43 MG/DL — HIGH (ref 7–23)
CALCIUM SERPL-MCNC: 7.8 MG/DL — LOW (ref 8.4–10.5)
CHLORIDE SERPL-SCNC: 95 MMOL/L — LOW (ref 96–108)
CO2 SERPL-SCNC: 25 MMOL/L — SIGNIFICANT CHANGE UP (ref 22–31)
CREAT SERPL-MCNC: 5.35 MG/DL — HIGH (ref 0.5–1.3)
GLUCOSE SERPL-MCNC: 134 MG/DL — HIGH (ref 70–99)
HCT VFR BLD CALC: 26.3 % — LOW (ref 34.5–45)
HGB BLD-MCNC: 8.9 G/DL — LOW (ref 11.5–15.5)
MCHC RBC-ENTMCNC: 33.6 GM/DL — SIGNIFICANT CHANGE UP (ref 32–36)
MCHC RBC-ENTMCNC: 34 PG — SIGNIFICANT CHANGE UP (ref 27–34)
MCV RBC AUTO: 101 FL — HIGH (ref 80–100)
PLATELET # BLD AUTO: 213 K/UL — SIGNIFICANT CHANGE UP (ref 150–400)
POTASSIUM SERPL-MCNC: 4.8 MMOL/L — SIGNIFICANT CHANGE UP (ref 3.5–5.3)
POTASSIUM SERPL-SCNC: 4.8 MMOL/L — SIGNIFICANT CHANGE UP (ref 3.5–5.3)
RBC # BLD: 2.6 M/UL — LOW (ref 3.8–5.2)
RBC # FLD: 13.1 % — SIGNIFICANT CHANGE UP (ref 10.3–14.5)
SODIUM SERPL-SCNC: 135 MMOL/L — SIGNIFICANT CHANGE UP (ref 135–145)
WBC # BLD: 2.7 K/UL — LOW (ref 3.8–10.5)
WBC # FLD AUTO: 2.7 K/UL — LOW (ref 3.8–10.5)

## 2017-05-24 PROCEDURE — 80053 COMPREHEN METABOLIC PANEL: CPT

## 2017-05-24 PROCEDURE — 83605 ASSAY OF LACTIC ACID: CPT

## 2017-05-24 PROCEDURE — 82550 ASSAY OF CK (CPK): CPT

## 2017-05-24 PROCEDURE — 82803 BLOOD GASES ANY COMBINATION: CPT

## 2017-05-24 PROCEDURE — 84484 ASSAY OF TROPONIN QUANT: CPT

## 2017-05-24 PROCEDURE — 93005 ELECTROCARDIOGRAM TRACING: CPT

## 2017-05-24 PROCEDURE — 82435 ASSAY OF BLOOD CHLORIDE: CPT

## 2017-05-24 PROCEDURE — 82330 ASSAY OF CALCIUM: CPT

## 2017-05-24 PROCEDURE — 71045 X-RAY EXAM CHEST 1 VIEW: CPT

## 2017-05-24 PROCEDURE — 99261: CPT

## 2017-05-24 PROCEDURE — 84132 ASSAY OF SERUM POTASSIUM: CPT

## 2017-05-24 PROCEDURE — 80048 BASIC METABOLIC PNL TOTAL CA: CPT

## 2017-05-24 PROCEDURE — 82947 ASSAY GLUCOSE BLOOD QUANT: CPT

## 2017-05-24 PROCEDURE — 84295 ASSAY OF SERUM SODIUM: CPT

## 2017-05-24 PROCEDURE — 85027 COMPLETE CBC AUTOMATED: CPT

## 2017-05-24 PROCEDURE — 99233 SBSQ HOSP IP/OBS HIGH 50: CPT

## 2017-05-24 PROCEDURE — 85014 HEMATOCRIT: CPT

## 2017-05-24 PROCEDURE — 82553 CREATINE MB FRACTION: CPT

## 2017-05-24 PROCEDURE — 83880 ASSAY OF NATRIURETIC PEPTIDE: CPT

## 2017-05-24 PROCEDURE — 99285 EMERGENCY DEPT VISIT HI MDM: CPT | Mod: 25

## 2017-05-24 RX ORDER — METOPROLOL TARTRATE 50 MG
1 TABLET ORAL
Qty: 0 | Refills: 0 | COMMUNITY
Start: 2017-05-24

## 2017-05-24 RX ORDER — ATORVASTATIN CALCIUM 80 MG/1
1 TABLET, FILM COATED ORAL
Qty: 0 | Refills: 0 | COMMUNITY
Start: 2017-05-24

## 2017-05-24 RX ORDER — HYDRALAZINE HCL 50 MG
3 TABLET ORAL
Qty: 0 | Refills: 0 | COMMUNITY
Start: 2017-05-24

## 2017-05-24 RX ORDER — HYDRALAZINE HCL 50 MG
3 TABLET ORAL
Qty: 0 | Refills: 0 | COMMUNITY

## 2017-05-24 RX ORDER — OXYCODONE HYDROCHLORIDE 5 MG/1
1 TABLET ORAL
Qty: 8 | Refills: 0
Start: 2017-05-24 | End: 2017-05-26

## 2017-05-24 RX ORDER — FUROSEMIDE 40 MG
1 TABLET ORAL
Qty: 0 | Refills: 0 | COMMUNITY
Start: 2017-05-24

## 2017-05-24 RX ORDER — HYDRALAZINE HCL 50 MG
4 TABLET ORAL
Qty: 0 | Refills: 0 | COMMUNITY
Start: 2017-05-24

## 2017-05-24 RX ORDER — LISINOPRIL 2.5 MG/1
1 TABLET ORAL
Qty: 0 | Refills: 0 | COMMUNITY
Start: 2017-05-24

## 2017-05-24 RX ORDER — DULOXETINE HYDROCHLORIDE 30 MG/1
1 CAPSULE, DELAYED RELEASE ORAL
Qty: 0 | Refills: 0 | COMMUNITY
Start: 2017-05-24

## 2017-05-24 RX ORDER — ASPIRIN/CALCIUM CARB/MAGNESIUM 324 MG
1 TABLET ORAL
Qty: 0 | Refills: 0 | COMMUNITY
Start: 2017-05-24

## 2017-05-24 RX ORDER — OXYCODONE HYDROCHLORIDE 5 MG/1
5 TABLET ORAL ONCE
Qty: 0 | Refills: 0 | Status: DISCONTINUED | OUTPATIENT
Start: 2017-05-24 | End: 2017-05-24

## 2017-05-24 RX ORDER — CLOPIDOGREL BISULFATE 75 MG/1
1 TABLET, FILM COATED ORAL
Qty: 0 | Refills: 0 | COMMUNITY
Start: 2017-05-24

## 2017-05-24 RX ORDER — CINACALCET 30 MG/1
1 TABLET, FILM COATED ORAL
Qty: 0 | Refills: 0 | COMMUNITY
Start: 2017-05-24

## 2017-05-24 RX ADMIN — Medication 81 MILLIGRAM(S): at 13:32

## 2017-05-24 RX ADMIN — OXYCODONE HYDROCHLORIDE 5 MILLIGRAM(S): 5 TABLET ORAL at 00:30

## 2017-05-24 RX ADMIN — Medication 75 MILLIGRAM(S): at 13:33

## 2017-05-24 RX ADMIN — ERYTHROPOIETIN 10000 UNIT(S): 10000 INJECTION, SOLUTION INTRAVENOUS; SUBCUTANEOUS at 10:27

## 2017-05-24 RX ADMIN — SEVELAMER CARBONATE 2400 MILLIGRAM(S): 2400 POWDER, FOR SUSPENSION ORAL at 10:27

## 2017-05-24 RX ADMIN — Medication 1 TABLET(S): at 13:33

## 2017-05-24 RX ADMIN — Medication 75 MILLIGRAM(S): at 06:08

## 2017-05-24 RX ADMIN — INSULIN ASPART 1 EACH: 100 INJECTION, SOLUTION SUBCUTANEOUS at 08:15

## 2017-05-24 RX ADMIN — OXYCODONE HYDROCHLORIDE 5 MILLIGRAM(S): 5 TABLET ORAL at 13:30

## 2017-05-24 RX ADMIN — DULOXETINE HYDROCHLORIDE 60 MILLIGRAM(S): 30 CAPSULE, DELAYED RELEASE ORAL at 13:34

## 2017-05-24 RX ADMIN — SEVELAMER CARBONATE 2400 MILLIGRAM(S): 2400 POWDER, FOR SUSPENSION ORAL at 13:31

## 2017-05-24 RX ADMIN — OXYCODONE HYDROCHLORIDE 5 MILLIGRAM(S): 5 TABLET ORAL at 10:48

## 2017-05-24 RX ADMIN — OXYCODONE HYDROCHLORIDE 5 MILLIGRAM(S): 5 TABLET ORAL at 00:00

## 2017-05-24 NOTE — DISCHARGE NOTE ADULT - MEDICATION SUMMARY - MEDICATIONS TO TAKE
I will START or STAY ON the medications listed below when I get home from the hospital:    aspirin 81 mg oral delayed release tablet  -- 1 tab(s) by mouth once a day  -- Indication: For antiplatlet    lisinopril 40 mg oral tablet  -- 1 tab(s) by mouth once a day  -- Indication: For htn    DULoxetine 60 mg oral delayed release capsule  -- 1 cap(s) by mouth once a day  -- Indication: For Depression    NovoLOG 100 units/mL subcutaneous solution  --  subcutaneous   Novolog  Insulin Pump    Basal rate start at 0000 at 0.40 units /hr  0330 at 0.50 units/hr  0600 at 0.425 units/hr   Insulin carb ratio   0000 :1.9 units/gram  1000:  1.10unit/gram   1500: 1.9 units/gram   Insulin sensitivity factor   0000 ISF 60  0700 ISF 40  1800 ISF 60  BGT   0000: 110-130 mg/dl   0800 :  100-120            -- Indication: For Dm type 1    atorvastatin 20 mg oral tablet  -- 1 tab(s) by mouth once a day (at bedtime)  -- Indication: For Statin    clopidogrel 75 mg oral tablet  -- 1 tab(s) by mouth once a day (at bedtime)  -- Indication: For Cad    metoprolol succinate 50 mg oral tablet, extended release  -- 1 tab(s) by mouth once a day  -- Indication: For Cad    furosemide 40 mg oral tablet  -- 1 tab(s) by mouth once a day  -- Indication: For Chf    cinacalcet 30 mg oral tablet  -- 1 tab(s) by mouth once a day (at bedtime)  -- Indication: For ESRD (end stage renal disease)    Renvela 800 mg oral tablet  -- 3 tab(s) by mouth 3 times a day  -- Indication: For ESRD (end stage renal disease)    hydrALAZINE 25 mg oral tablet  -- 3 tab(s) by mouth every 8 hours  -- Indication: For htn    Multiple Vitamins oral tablet  -- 1 tab(s) by mouth once a day  -- Indication: For Supplement I will START or STAY ON the medications listed below when I get home from the hospital:    aspirin 81 mg oral delayed release tablet  -- 1 tab(s) by mouth once a day  -- Indication: For antiplatlet    acetaminophen-oxycodone 325 mg-5 mg oral tablet  -- 1 tab(s) by mouth every 6 hours MDD:4  -- Caution federal law prohibits the transfer of this drug to any person other  than the person for whom it was prescribed.  May cause drowsiness.  Alcohol may intensify this effect.  Use care when operating dangerous machinery.  This prescription cannot be refilled.  This product contains acetaminophen.  Do not use  with any other product containing acetaminophen to prevent possible liver damage.  Using more of this medication than prescribed may cause serious breathing problems.    -- Indication: For pain    lisinopril 40 mg oral tablet  -- 1 tab(s) by mouth once a day  -- Indication: For htn    DULoxetine 60 mg oral delayed release capsule  -- 1 cap(s) by mouth once a day  -- Indication: For Depression    NovoLOG 100 units/mL subcutaneous solution  --  subcutaneous   Novolog  Insulin Pump    Basal rate start at 0000 at 0.40 units /hr  0330 at 0.50 units/hr  0600 at 0.425 units/hr   Insulin carb ratio   0000 :1.9 units/gram  1000:  1.10unit/gram   1500: 1.9 units/gram   Insulin sensitivity factor   0000 ISF 60  0700 ISF 40  1800 ISF 60  BGT   0000: 110-130 mg/dl   0800 :  100-120            -- Indication: For Dm type 1    atorvastatin 20 mg oral tablet  -- 1 tab(s) by mouth once a day (at bedtime)  -- Indication: For Statin    clopidogrel 75 mg oral tablet  -- 1 tab(s) by mouth once a day (at bedtime)  -- Indication: For Cad    metoprolol succinate 50 mg oral tablet, extended release  -- 1 tab(s) by mouth once a day  -- Indication: For Cad    furosemide 40 mg oral tablet  -- 1 tab(s) by mouth once a day  -- Indication: For Chf    cinacalcet 30 mg oral tablet  -- 1 tab(s) by mouth once a day (at bedtime)  -- Indication: For ESRD (end stage renal disease)    Renvela 800 mg oral tablet  -- 3 tab(s) by mouth 3 times a day  -- Indication: For ESRD (end stage renal disease)    hydrALAZINE 25 mg oral tablet  -- 3 tab(s) by mouth every 8 hours  -- Indication: For htn    Multiple Vitamins oral tablet  -- 1 tab(s) by mouth once a day  -- Indication: For Supplement

## 2017-05-24 NOTE — DISCHARGE NOTE ADULT - CARE PROVIDER_API CALL
Daisy Burger (), Nephrology  [address]  [place] [address]  Phone: [phone]  Fax: [phone]    Mauro Vela), Cardiovascular Disease; Internal Medicine; Interventional Cardiology  [address]  [place] [address]  Phone: [phone]  Fax: [phone] Daisy Burger (), Nephrology  [address]  [place] [address]  Phone: [phone]  Fax: [phone]    Mauro Vela), Cardiovascular Disease; Internal Medicine; Interventional Cardiology  [address]  [place] [address]  Phone: [phone]  Fax: [phone]    Arsalan Castro), Internal Medicine  [address] [place] [address]  Phone: [phone]  Fax: [phone]    Pina Ley (SUSAN), EndocrinologyMetabDiabetes  [address] [place] [address]  Phone: [phone]  Fax: [phone]

## 2017-05-24 NOTE — PROVIDER CONTACT NOTE (OTHER) - ASSESSMENT
Patient is alert,oriented x4 refusd heparin subcut tonight,states shebled a lot from the morning dose of heparin subcut.Patient verbalises udnerstanding of the risks and benefits of heparin and the risk she has of developing DVT.Ambulatory with minimal assist.

## 2017-05-24 NOTE — DISCHARGE NOTE ADULT - CARE PLAN
Principal Discharge DX:	SOB (shortness of breath)  Goal:	symptoms resolved  Instructions for follow-up, activity and diet:	Weigh yourself daily.  If you gain 3lbs in 3 days, or 5lbs in a week call your Health Care Provider.  Do not eat or drink foods containing more than 2000mg of salt (sodium) in your diet every day.  Call your Health Care Provider if you have any swelling or increased swelling in your feet, ankles, and/or stomach.  Take all of your medication as directed.  If you become dizzy call your Health Care Provider.  Secondary Diagnosis:	ESRD (end stage renal disease)  Instructions for follow-up, activity and diet:	Avoid taking (NSAIDs) - (ex: Ibuprofen, Advil, Celebrex, Naprosyn)  Avoid taking any nephrotoxic agents (can harm kidneys) - Intravenous contrast for diagnostic testing, combination cold medications.  Have all medications adjusted for your renal function by your Health Care Provider.  Blood pressure control is important.  Take all medication as prescribed.  Secondary Diagnosis:	Diabetes mellitus type 1  Instructions for follow-up, activity and diet:	HgA1C this admission.  Make sure you get your HgA1c checked every three months.  If you take oral diabetes medications, check your blood glucose two times a day.  If you take insulin, check your blood glucose before meals and at bedtime.  It's important not to skip any meals.  Keep a log of your blood glucose results and always take it with you to your doctor appointments.  Keep a list of your current medications including injectables and over the counter medications and bring this medication list with you to all your doctor appointments.  If you have not seen your opthalmologist this year call for appointment.  Check your feet daily for redness, sores, or openings. Do not self treat. If no improvement in two days call your primary care physician for an appointment.  Low blood sugar (hypoglycemia) is a blood sugar below 70mg/dl. Check your blood sugar if you feel signs/symptoms of hypoglycemia. If your blood sugar is below 70 take 15 grams of carbohydrates (ex 4 oz of apple juice, 3-4 glucosr tablets, or 4-6 oz of regular soda) wait 15 minutes and repeat blood sugar to make sure it comes up above 70.  If your blood sugar is above 70 and you are due for a meal, have a meal.  If you are not due for a meal have a snack.  This snack helps keeps your blood sugar at a safe range. Principal Discharge DX:	Atypical chest pain  Goal:	symptoms resolved  Instructions for follow-up, activity and diet:	HOME CARE INSTRUCTIONS  For the next few days, avoid physical activities that bring on chest pain. Continue physical activities as directed.  Do not smoke.  Avoid drinking alcohol.   Only take over-the-counter or prescription medicine for pain, discomfort, or fever as directed by your caregiver.  Follow your caregiver's suggestions for further testing if your chest pain does not go away.  Keep any follow-up appointments you made. If you do not go to an appointment, you could develop lasting (chronic) problems with pain. If there is any problem keeping an appointment, you must call to reschedule.   SEEK MEDICAL CARE IF:  You think you are having problems from the medicine you are taking. Read your medicine instructions carefully.  Your chest pain does not go away, even after treatment.  You develop a rash with blisters on your chest.  SEEK IMMEDIATE MEDICAL CARE IF:  You have increased chest pain or pain that spreads to your arm, neck, jaw, back, or abdomen.   You develop shortness of breath, an increasing cough, or you are coughing up blood.  You have severe back or abdominal pain, feel nauseous, or vomit.  You develop severe weakness, fainting, or chills.  You have a fever.  THIS IS AN EMERGENCY. Do not wait to see if the pain will go away. Get medical help at once. Call your local emergency services (_____________________). Do not drive yourself to the hospital.  Secondary Diagnosis:	ESRD (end stage renal disease)  Instructions for follow-up, activity and diet:	Avoid taking (NSAIDs) - (ex: Ibuprofen, Advil, Celebrex, Naprosyn)  Avoid taking any nephrotoxic agents (can harm kidneys) - Intravenous contrast for diagnostic testing, combination cold medications.  Have all medications adjusted for your renal function by your Health Care Provider.  Blood pressure control is important.  Take all medication as prescribed.  Secondary Diagnosis:	Diabetes mellitus type 1  Instructions for follow-up, activity and diet:	HgA1C this admission 8.5  Make sure you get your HgA1c checked every three months.  If you take oral diabetes medications, check your blood glucose two times a day.  If you take insulin, check your blood glucose before meals and at bedtime.  It's important not to skip any meals.  Keep a log of your blood glucose results and always take it with you to your doctor appointments.  Keep a list of your current medications including injectables and over the counter medications and bring this medication list with you to all your doctor appointments.  If you have not seen your opthalmologist this year call for appointment.  Check your feet daily for redness, sores, or openings. Do not self treat. If no improvement in two days call your primary care physician for an appointment.  Low blood sugar (hypoglycemia) is a blood sugar below 70mg/dl. Check your blood sugar if you feel signs/symptoms of hypoglycemia. If your blood sugar is below 70 take 15 grams of carbohydrates (ex 4 oz of apple juice, 3-4 glucosr tablets, or 4-6 oz of regular soda) wait 15 minutes and repeat blood sugar to make sure it comes up above 70.  If your blood sugar is above 70 and you are due for a meal, have a meal.  If you are not due for a meal have a snack.  This snack helps keeps your blood sugar at a safe range.  Secondary Diagnosis:	Acute on chronic diastolic (congestive) heart failure  Instructions for follow-up, activity and diet:	Weigh yourself daily.  If you gain 3lbs in 3 days, or 5lbs in a week call your Health Care Provider.  Do not eat or drink foods containing more than 2000mg of salt (sodium) in your diet every day.  Call your Health Care Provider if you have any swelling or increased swelling in your feet, ankles, and/or stomach.  Take all of your medication as directed.  If you become dizzy call your Health Care Provider.

## 2017-05-24 NOTE — DISCHARGE NOTE ADULT - PROVIDER TOKENS
TOKEN:'[id number]' TOKEN:'[id number]'

## 2017-05-24 NOTE — DISCHARGE NOTE ADULT - SECONDARY DIAGNOSIS.
ESRD (end stage renal disease) Diabetes mellitus type 1 Acute on chronic diastolic (congestive) heart failure

## 2017-05-24 NOTE — DISCHARGE NOTE ADULT - PLAN OF CARE
symptoms resolved Weigh yourself daily.  If you gain 3lbs in 3 days, or 5lbs in a week call your Health Care Provider.  Do not eat or drink foods containing more than 2000mg of salt (sodium) in your diet every day.  Call your Health Care Provider if you have any swelling or increased swelling in your feet, ankles, and/or stomach.  Take all of your medication as directed.  If you become dizzy call your Health Care Provider. Avoid taking (NSAIDs) - (ex: Ibuprofen, Advil, Celebrex, Naprosyn)  Avoid taking any nephrotoxic agents (can harm kidneys) - Intravenous contrast for diagnostic testing, combination cold medications.  Have all medications adjusted for your renal function by your Health Care Provider.  Blood pressure control is important.  Take all medication as prescribed. HgA1C this admission.  Make sure you get your HgA1c checked every three months.  If you take oral diabetes medications, check your blood glucose two times a day.  If you take insulin, check your blood glucose before meals and at bedtime.  It's important not to skip any meals.  Keep a log of your blood glucose results and always take it with you to your doctor appointments.  Keep a list of your current medications including injectables and over the counter medications and bring this medication list with you to all your doctor appointments.  If you have not seen your opthalmologist this year call for appointment.  Check your feet daily for redness, sores, or openings. Do not self treat. If no improvement in two days call your primary care physician for an appointment.  Low blood sugar (hypoglycemia) is a blood sugar below 70mg/dl. Check your blood sugar if you feel signs/symptoms of hypoglycemia. If your blood sugar is below 70 take 15 grams of carbohydrates (ex 4 oz of apple juice, 3-4 glucosr tablets, or 4-6 oz of regular soda) wait 15 minutes and repeat blood sugar to make sure it comes up above 70.  If your blood sugar is above 70 and you are due for a meal, have a meal.  If you are not due for a meal have a snack.  This snack helps keeps your blood sugar at a safe range. HOME CARE INSTRUCTIONS  For the next few days, avoid physical activities that bring on chest pain. Continue physical activities as directed.  Do not smoke.  Avoid drinking alcohol.   Only take over-the-counter or prescription medicine for pain, discomfort, or fever as directed by your caregiver.  Follow your caregiver's suggestions for further testing if your chest pain does not go away.  Keep any follow-up appointments you made. If you do not go to an appointment, you could develop lasting (chronic) problems with pain. If there is any problem keeping an appointment, you must call to reschedule.   SEEK MEDICAL CARE IF:  You think you are having problems from the medicine you are taking. Read your medicine instructions carefully.  Your chest pain does not go away, even after treatment.  You develop a rash with blisters on your chest.  SEEK IMMEDIATE MEDICAL CARE IF:  You have increased chest pain or pain that spreads to your arm, neck, jaw, back, or abdomen.   You develop shortness of breath, an increasing cough, or you are coughing up blood.  You have severe back or abdominal pain, feel nauseous, or vomit.  You develop severe weakness, fainting, or chills.  You have a fever.  THIS IS AN EMERGENCY. Do not wait to see if the pain will go away. Get medical help at once. Call your local emergency services (_____________________). Do not drive yourself to the hospital. HgA1C this admission 8.5  Make sure you get your HgA1c checked every three months.  If you take oral diabetes medications, check your blood glucose two times a day.  If you take insulin, check your blood glucose before meals and at bedtime.  It's important not to skip any meals.  Keep a log of your blood glucose results and always take it with you to your doctor appointments.  Keep a list of your current medications including injectables and over the counter medications and bring this medication list with you to all your doctor appointments.  If you have not seen your opthalmologist this year call for appointment.  Check your feet daily for redness, sores, or openings. Do not self treat. If no improvement in two days call your primary care physician for an appointment.  Low blood sugar (hypoglycemia) is a blood sugar below 70mg/dl. Check your blood sugar if you feel signs/symptoms of hypoglycemia. If your blood sugar is below 70 take 15 grams of carbohydrates (ex 4 oz of apple juice, 3-4 glucosr tablets, or 4-6 oz of regular soda) wait 15 minutes and repeat blood sugar to make sure it comes up above 70.  If your blood sugar is above 70 and you are due for a meal, have a meal.  If you are not due for a meal have a snack.  This snack helps keeps your blood sugar at a safe range.

## 2017-05-24 NOTE — DISCHARGE NOTE ADULT - ADDITIONAL INSTRUCTIONS
please follow up with PCP and Endocrinologist with in a week of discharge please follow up with PCP DR Castro  and Endocrinologist DR Ley  with in a week of discharge

## 2017-05-24 NOTE — DISCHARGE NOTE ADULT - HOSPITAL COURSE
To be completed by attending 61 year old female hx of dm1 with insulin pump, ACSs/p cath  esrd, chf, on dialysis T/TH/SAT sometimes on Wednesday c/o progressive dyspnea x 2 days. Pt reports worsening sob today while sleeping.05/22: Insulin pump, Endo consulted             Trend CE, 0.08, 0.11, creat 7.12  5/23: CE stable. HD: T-Th-Sat. Insulin pump continues. CHF for fluid overload. Possible d/c tomorrow.Cleared by cardiology Dr. Vela    DX:SOB       Hector pain

## 2017-05-24 NOTE — DISCHARGE NOTE ADULT - MEDICATION SUMMARY - MEDICATIONS TO CHANGE
I will SWITCH the dose or number of times a day I take the medications listed below when I get home from the hospital:    furosemide 40 mg oral tablet  -- 1 tab(s) by mouth 3 times a week  Monday, wednesday and friday

## 2017-05-24 NOTE — DISCHARGE NOTE ADULT - PATIENT PORTAL LINK FT
“You can access the Follow[facility] Patient Portal, offered by [facility], by registering with the following website: [email or web address]”

## 2017-06-03 ENCOUNTER — INPATIENT (INPATIENT)
Facility: HOSPITAL | Age: 62
LOS: 6 days | Discharge: HOME | DRG: 299 | End: 2017-06-10
Attending: INTERNAL MEDICINE | Admitting: INTERNAL MEDICINE
Payer: MEDICARE

## 2017-06-03 VITALS
TEMPERATURE: 99 F | DIASTOLIC BLOOD PRESSURE: 67 MMHG | OXYGEN SATURATION: 96 % | HEART RATE: 78 BPM | RESPIRATION RATE: 20 BRPM | SYSTOLIC BLOOD PRESSURE: 161 MMHG

## 2017-06-03 DIAGNOSIS — M79.606 PAIN IN LEG, UNSPECIFIED: ICD-10-CM

## 2017-06-03 DIAGNOSIS — M79.604 PAIN IN RIGHT LEG: ICD-10-CM

## 2017-06-03 DIAGNOSIS — I25.10 ATHEROSCLEROTIC HEART DISEASE OF NATIVE CORONARY ARTERY WITHOUT ANGINA PECTORIS: ICD-10-CM

## 2017-06-03 DIAGNOSIS — N18.6 END STAGE RENAL DISEASE: ICD-10-CM

## 2017-06-03 DIAGNOSIS — E10.9 TYPE 1 DIABETES MELLITUS WITHOUT COMPLICATIONS: ICD-10-CM

## 2017-06-03 DIAGNOSIS — Z98.89 OTHER SPECIFIED POSTPROCEDURAL STATES: Chronic | ICD-10-CM

## 2017-06-03 LAB
ALBUMIN SERPL ELPH-MCNC: 4.1 G/DL — SIGNIFICANT CHANGE UP (ref 3.3–5)
ALP SERPL-CCNC: 256 U/L — HIGH (ref 40–120)
ALT FLD-CCNC: 20 U/L RC — SIGNIFICANT CHANGE UP (ref 10–45)
ANION GAP SERPL CALC-SCNC: 15 MMOL/L — SIGNIFICANT CHANGE UP (ref 5–17)
APTT BLD: 29.2 SEC — SIGNIFICANT CHANGE UP (ref 27.5–37.4)
AST SERPL-CCNC: 30 U/L — SIGNIFICANT CHANGE UP (ref 10–40)
BASOPHILS # BLD AUTO: 0 K/UL — SIGNIFICANT CHANGE UP (ref 0–0.2)
BASOPHILS NFR BLD AUTO: 0.8 % — SIGNIFICANT CHANGE UP (ref 0–2)
BILIRUB SERPL-MCNC: 0.3 MG/DL — SIGNIFICANT CHANGE UP (ref 0.2–1.2)
BUN SERPL-MCNC: 45 MG/DL — HIGH (ref 7–23)
CALCIUM SERPL-MCNC: 8.1 MG/DL — LOW (ref 8.4–10.5)
CHLORIDE SERPL-SCNC: 93 MMOL/L — LOW (ref 96–108)
CO2 SERPL-SCNC: 30 MMOL/L — SIGNIFICANT CHANGE UP (ref 22–31)
CREAT SERPL-MCNC: 7.15 MG/DL — HIGH (ref 0.5–1.3)
EOSINOPHIL # BLD AUTO: 0.2 K/UL — SIGNIFICANT CHANGE UP (ref 0–0.5)
EOSINOPHIL NFR BLD AUTO: 2.8 % — SIGNIFICANT CHANGE UP (ref 0–6)
GLUCOSE SERPL-MCNC: 108 MG/DL — HIGH (ref 70–99)
HCT VFR BLD CALC: 26.5 % — LOW (ref 34.5–45)
HGB BLD-MCNC: 9.1 G/DL — LOW (ref 11.5–15.5)
INR BLD: 0.97 RATIO — SIGNIFICANT CHANGE UP (ref 0.88–1.16)
LYMPHOCYTES # BLD AUTO: 1.1 K/UL — SIGNIFICANT CHANGE UP (ref 1–3.3)
LYMPHOCYTES # BLD AUTO: 18.1 % — SIGNIFICANT CHANGE UP (ref 13–44)
MCHC RBC-ENTMCNC: 34.2 PG — HIGH (ref 27–34)
MCHC RBC-ENTMCNC: 34.4 GM/DL — SIGNIFICANT CHANGE UP (ref 32–36)
MCV RBC AUTO: 99.3 FL — SIGNIFICANT CHANGE UP (ref 80–100)
MONOCYTES # BLD AUTO: 0.6 K/UL — SIGNIFICANT CHANGE UP (ref 0–0.9)
MONOCYTES NFR BLD AUTO: 9.8 % — SIGNIFICANT CHANGE UP (ref 2–14)
NEUTROPHILS # BLD AUTO: 4.2 K/UL — SIGNIFICANT CHANGE UP (ref 1.8–7.4)
NEUTROPHILS NFR BLD AUTO: 68.5 % — SIGNIFICANT CHANGE UP (ref 43–77)
PLATELET # BLD AUTO: 237 K/UL — SIGNIFICANT CHANGE UP (ref 150–400)
POTASSIUM SERPL-MCNC: 4.1 MMOL/L — SIGNIFICANT CHANGE UP (ref 3.5–5.3)
POTASSIUM SERPL-SCNC: 4.1 MMOL/L — SIGNIFICANT CHANGE UP (ref 3.5–5.3)
PROT SERPL-MCNC: 6.9 G/DL — SIGNIFICANT CHANGE UP (ref 6–8.3)
PROTHROM AB SERPL-ACNC: 10.5 SEC — SIGNIFICANT CHANGE UP (ref 9.8–12.7)
RBC # BLD: 2.67 M/UL — LOW (ref 3.8–5.2)
RBC # FLD: 12.8 % — SIGNIFICANT CHANGE UP (ref 10.3–14.5)
SODIUM SERPL-SCNC: 138 MMOL/L — SIGNIFICANT CHANGE UP (ref 135–145)
WBC # BLD: 6.2 K/UL — SIGNIFICANT CHANGE UP (ref 3.8–10.5)
WBC # FLD AUTO: 6.2 K/UL — SIGNIFICANT CHANGE UP (ref 3.8–10.5)

## 2017-06-03 PROCEDURE — 75635 CT ANGIO ABDOMINAL ARTERIES: CPT | Mod: 26

## 2017-06-03 PROCEDURE — 73590 X-RAY EXAM OF LOWER LEG: CPT | Mod: 26,LT

## 2017-06-03 PROCEDURE — 99285 EMERGENCY DEPT VISIT HI MDM: CPT | Mod: GC

## 2017-06-03 PROCEDURE — 93971 EXTREMITY STUDY: CPT | Mod: 26

## 2017-06-03 RX ORDER — DEXTROSE 50 % IN WATER 50 %
12.5 SYRINGE (ML) INTRAVENOUS ONCE
Qty: 0 | Refills: 0 | Status: DISCONTINUED | OUTPATIENT
Start: 2017-06-03 | End: 2017-06-10

## 2017-06-03 RX ORDER — DEXTROSE 50 % IN WATER 50 %
1 SYRINGE (ML) INTRAVENOUS ONCE
Qty: 0 | Refills: 0 | Status: DISCONTINUED | OUTPATIENT
Start: 2017-06-03 | End: 2017-06-10

## 2017-06-03 RX ORDER — INSULIN ASPART 100 [IU]/ML
1 INJECTION, SOLUTION SUBCUTANEOUS
Qty: 0 | Refills: 0 | Status: DISCONTINUED | OUTPATIENT
Start: 2017-06-03 | End: 2017-06-07

## 2017-06-03 RX ORDER — HYDRALAZINE HCL 50 MG
100 TABLET ORAL EVERY 8 HOURS
Qty: 0 | Refills: 0 | Status: DISCONTINUED | OUTPATIENT
Start: 2017-06-03 | End: 2017-06-10

## 2017-06-03 RX ORDER — DEXTROSE 50 % IN WATER 50 %
25 SYRINGE (ML) INTRAVENOUS ONCE
Qty: 0 | Refills: 0 | Status: DISCONTINUED | OUTPATIENT
Start: 2017-06-03 | End: 2017-06-10

## 2017-06-03 RX ORDER — OXYCODONE HYDROCHLORIDE 5 MG/1
5 TABLET ORAL ONCE
Qty: 0 | Refills: 0 | Status: DISCONTINUED | OUTPATIENT
Start: 2017-06-03 | End: 2017-06-03

## 2017-06-03 RX ORDER — MORPHINE SULFATE 50 MG/1
4 CAPSULE, EXTENDED RELEASE ORAL ONCE
Qty: 0 | Refills: 0 | Status: DISCONTINUED | OUTPATIENT
Start: 2017-06-03 | End: 2017-06-03

## 2017-06-03 RX ORDER — CINACALCET 30 MG/1
60 TABLET, FILM COATED ORAL DAILY
Qty: 0 | Refills: 0 | Status: DISCONTINUED | OUTPATIENT
Start: 2017-06-03 | End: 2017-06-10

## 2017-06-03 RX ORDER — ACETAMINOPHEN 500 MG
650 TABLET ORAL EVERY 6 HOURS
Qty: 0 | Refills: 0 | Status: DISCONTINUED | OUTPATIENT
Start: 2017-06-03 | End: 2017-06-10

## 2017-06-03 RX ORDER — FUROSEMIDE 40 MG
40 TABLET ORAL
Qty: 0 | Refills: 0 | Status: DISCONTINUED | OUTPATIENT
Start: 2017-06-03 | End: 2017-06-10

## 2017-06-03 RX ORDER — ATORVASTATIN CALCIUM 80 MG/1
20 TABLET, FILM COATED ORAL AT BEDTIME
Qty: 0 | Refills: 0 | Status: DISCONTINUED | OUTPATIENT
Start: 2017-06-03 | End: 2017-06-10

## 2017-06-03 RX ORDER — HYDROMORPHONE HYDROCHLORIDE 2 MG/ML
0.5 INJECTION INTRAMUSCULAR; INTRAVENOUS; SUBCUTANEOUS EVERY 6 HOURS
Qty: 0 | Refills: 0 | Status: DISCONTINUED | OUTPATIENT
Start: 2017-06-03 | End: 2017-06-03

## 2017-06-03 RX ORDER — SEVELAMER CARBONATE 2400 MG/1
2400 POWDER, FOR SUSPENSION ORAL
Qty: 0 | Refills: 0 | Status: DISCONTINUED | OUTPATIENT
Start: 2017-06-03 | End: 2017-06-10

## 2017-06-03 RX ORDER — SODIUM CHLORIDE 9 MG/ML
1000 INJECTION, SOLUTION INTRAVENOUS
Qty: 0 | Refills: 0 | Status: DISCONTINUED | OUTPATIENT
Start: 2017-06-03 | End: 2017-06-10

## 2017-06-03 RX ORDER — CLOPIDOGREL BISULFATE 75 MG/1
75 TABLET, FILM COATED ORAL AT BEDTIME
Qty: 0 | Refills: 0 | Status: DISCONTINUED | OUTPATIENT
Start: 2017-06-03 | End: 2017-06-10

## 2017-06-03 RX ORDER — INSULIN LISPRO 100/ML
VIAL (ML) SUBCUTANEOUS
Qty: 0 | Refills: 0 | Status: DISCONTINUED | OUTPATIENT
Start: 2017-06-03 | End: 2017-06-04

## 2017-06-03 RX ORDER — CEFTRIAXONE 500 MG/1
1 INJECTION, POWDER, FOR SOLUTION INTRAMUSCULAR; INTRAVENOUS ONCE
Qty: 0 | Refills: 0 | Status: COMPLETED | OUTPATIENT
Start: 2017-06-03 | End: 2017-06-03

## 2017-06-03 RX ORDER — INSULIN GLARGINE 100 [IU]/ML
9.5 INJECTION, SOLUTION SUBCUTANEOUS ONCE
Qty: 0 | Refills: 0 | Status: DISCONTINUED | OUTPATIENT
Start: 2017-06-03 | End: 2017-06-03

## 2017-06-03 RX ORDER — GLUCAGON INJECTION, SOLUTION 0.5 MG/.1ML
1 INJECTION, SOLUTION SUBCUTANEOUS ONCE
Qty: 0 | Refills: 0 | Status: DISCONTINUED | OUTPATIENT
Start: 2017-06-03 | End: 2017-06-10

## 2017-06-03 RX ORDER — DULOXETINE HYDROCHLORIDE 30 MG/1
60 CAPSULE, DELAYED RELEASE ORAL DAILY
Qty: 0 | Refills: 0 | Status: DISCONTINUED | OUTPATIENT
Start: 2017-06-03 | End: 2017-06-10

## 2017-06-03 RX ORDER — LISINOPRIL 2.5 MG/1
40 TABLET ORAL DAILY
Qty: 0 | Refills: 0 | Status: DISCONTINUED | OUTPATIENT
Start: 2017-06-03 | End: 2017-06-10

## 2017-06-03 RX ORDER — METOPROLOL TARTRATE 50 MG
50 TABLET ORAL DAILY
Qty: 0 | Refills: 0 | Status: DISCONTINUED | OUTPATIENT
Start: 2017-06-03 | End: 2017-06-10

## 2017-06-03 RX ORDER — ASPIRIN/CALCIUM CARB/MAGNESIUM 324 MG
81 TABLET ORAL DAILY
Qty: 0 | Refills: 0 | Status: DISCONTINUED | OUTPATIENT
Start: 2017-06-03 | End: 2017-06-10

## 2017-06-03 RX ORDER — HEPARIN SODIUM 5000 [USP'U]/ML
5000 INJECTION INTRAVENOUS; SUBCUTANEOUS EVERY 12 HOURS
Qty: 0 | Refills: 0 | Status: DISCONTINUED | OUTPATIENT
Start: 2017-06-03 | End: 2017-06-10

## 2017-06-03 RX ADMIN — OXYCODONE HYDROCHLORIDE 5 MILLIGRAM(S): 5 TABLET ORAL at 14:19

## 2017-06-03 RX ADMIN — MORPHINE SULFATE 4 MILLIGRAM(S): 50 CAPSULE, EXTENDED RELEASE ORAL at 16:01

## 2017-06-03 RX ADMIN — CEFTRIAXONE 100 GRAM(S): 500 INJECTION, POWDER, FOR SOLUTION INTRAMUSCULAR; INTRAVENOUS at 16:05

## 2017-06-03 NOTE — ED ADULT NURSE REASSESSMENT NOTE - NS ED NURSE REASSESS COMMENT FT1
Spoke with endocrine and they said the patient or the  has to change the insulin.  Insulin pending from pharmacy.  Endocrine spoke with patient regarding changing insulin and she said she is capable of changing the insulin.  Per ED resident, patient has 11 units of insulin remaining and only uses 9 units of insulin per day.

## 2017-06-03 NOTE — ED PROVIDER NOTE - MEDICAL DECISION MAKING DETAILS
****ATTENDING**** 60yo f hx listed pw LLE pain, states she has chronic pain over 1 year now worsening. Patient saw her vascular surgeon 2 d ago who ordered outpatient studies. Patient noticed redness to leg today. no fever or chills.   Check Labs, Xray and US to eval for swelling and pain, vascular consult. Pain control and re eval.

## 2017-06-03 NOTE — ED PROVIDER NOTE - PROGRESS NOTE DETAILS
I s Spoke with Dr. Wasserman (endo) who says patient can stay on insulin pump for floor and dialysis, to keep settings as is. patient signed out to me pending dispo, discussed with renal for hd tomorrw since she will have a ct angio today as per vascular, to admit to abrudesci as she has multiple medical problems. patient signed out to me pending dispo, discussed with renal for hd tomorrw since she will have a ct angio today as per vascular, to admit to abrudesci as she has multiple medical problems.  sue brar md D/w vascular surgery who reviewed images with radiology.  Per vasc surg, all vessels patent, graft patent, no occlusions or stenoses.  No indication of cause for pain.  D/w Abrudescu and will admit for dialysis and further w/u. pt approached with w alert from pump regarding low insulin. d/w attg from endocrine ([place]). Entered order from pump. Pump states 11 units left should last 24+. Will hold on lantus for now. endocrine fellow has not called back despite multiple pages both directly and by the service already hours ago. RNs called dm educator hrs ago and again, pt to refill pump herself per endo education. hussein

## 2017-06-03 NOTE — H&P ADULT. - HISTORY OF PRESENT ILLNESS
61 F pmh DM, HTN, HLD, CAD, esrd on HD t/th/sat, CVA w memory deficit p/w left leg pain at HD today.  Pt was dialyzed for 30mins then pain was too strong and came to ED.  Pt has had multiple similar episodes of leg pain and been admitted in past.  Leg feels swollen and cramping, 10/10 pain.  tylenol did not relieve pain.  Pain is better with standing and worse with sitting.  Pain had started last night and patient took her percocet last night with little relief.

## 2017-06-03 NOTE — ED ADULT NURSE NOTE - OBJECTIVE STATEMENT
61 year old female alert and oriented x 4 came to the ED accompanied by  c/o left leg cramping during HD.  Patient was 30 minutes into her HD treatment today and developed severe pain in the left leg so treatment was stopped and patient was given Tylenol which she said helped bring her pain level from a 10/10 to 3/10.  Patient has had pain in her left leg for about 1 year, but it worsened during her treatment today.  Patient is dialyzed T, R, and Saturday and last treatment was Thursday which she had in her entirety and she tolerated well.  Patient did not sleep well last night because of the pain.  Patient normally takes one tab of percocet for pain at home.  Patient has +2 leg and ankle swelling on the left side with patchy redness on the calf and patient is very tender to touch.  Redness appears new to .  Patient is to be seen by vascular surgery for her leg pain. Patient able to move b/l legs and sensation intact with positive pedal pulses.  Patient denies; shortness of breath, chest pain, nausea, vomiting, dizziness.  Patient has equal and symmetrical chest rise.  Left AV fistula with positive thrill.  IV established in the right AC #20 and patient wearing an insulin pump.  Safety ensured. 61 year old female alert and oriented x 4 came to the ED accompanied by  c/o left leg cramping during HD.  Patient was 30 minutes into her HD treatment today and developed severe pain in the left leg so treatment was stopped and patient was given Tylenol which she said helped bring her pain level from a 10/10 to 3/10.  Patient has had pain in her left leg for about 1 year, but it worsened during her treatment today.  Patient is dialyzed T, R, and Saturday and last treatment was Thursday which she had in her entirety and she tolerated well.  Patient did not sleep well last night because of the pain.  Patient normally take 5mg of percocet for pain at home.  Patient has +2 leg and ankle swelling on the left side with patchy redness on the calf and patient is very tender to touch.  Redness appears new to .  Patient is to be seen by vascular surgery for her leg pain. Patient able to move b/l legs and sensation intact with positive pedal pulses.  Patient denies; shortness of breath, chest pain, nausea, vomiting, dizziness.  Patient has equal and symmetrical chest rise.  Left AV fistula with positive thrill.  IV established in the right AC #20 and patient wearing an insulin pump.  Safety ensured.

## 2017-06-03 NOTE — ED PROVIDER NOTE - PHYSICAL EXAMINATION
****ATTENDING**** Patient is awake,alert,oriented x 3. Patient's chest is clear to ausculation, +s1s2. Abdomen is soft nd/nt +BS. LUE + Fistula and thrill. LLE + Swelling + erythema no crepitus. cap refill < 2secs. ****ATTENDING**** Patient is awake,alert,oriented x 3. Patient's chest is clear to ausculation, +s1s2. Abdomen is soft nd/nt +BS. LUE + Fistula and thrill. LLE + Swelling + erythema no crepitus. cap refill < 2secs.      - Liza Kaminski,   Gen: in severe pain  Head: NCAT  HEENT: PERRL, oral mucosa moist, normal conjunctiva  Lung: CTAB, no respiratory distress  CV: rrr, no murmurs, Normal perfusion  Abd: soft, NTND  MSK: edema and erythema of left lower leg with tenderness to palpation, no visible deformities.  Sensation, motor intact bilateral extremities.  Cap refill < 2sec bilaterally.   Neuro: No focal neurologic deficits  Skin: No rash   Psych: normal affect

## 2017-06-03 NOTE — ED ADULT NURSE REASSESSMENT NOTE - NS ED NURSE REASSESS COMMENT FT1
spoke with dr tellez from endocrine regarding insulin pump. pt okay to have insulin pump in place unless pt hast to go to OR

## 2017-06-03 NOTE — ED ADULT NURSE REASSESSMENT NOTE - NS ED NURSE REASSESS COMMENT FT1
updated on plan of care and given phone numbers to the hospital as he requested.  Patient in no acute distress.  Patient ambulating with a steady gait.

## 2017-06-03 NOTE — ED PROVIDER NOTE - ATTENDING CONTRIBUTION TO CARE
****ATTENDING**** 62yo f hx listed pw LLE pain, states she has chronic pain over 1 year now worsening. Patient saw her vascular surgeon 2 d ago who ordered outpatient studies. Patient noticed redness to leg today. no fever or chills.   Check Labs, Xray and US to eval for swelling and pain, vascular consult. Pain control and re eval.

## 2017-06-03 NOTE — ED PROVIDER NOTE - OBJECTIVE STATEMENT
61 F pmh DM, HTN, HLD, CAD, esrd on HD t/th/sat, CVA w memory deficit p/w left leg pain at HD today.  Pt was dialyzed for 30mins then pain was too strong and came to ED.  Pt has had multiple similar episodes of leg pain and been admitted in past.  Leg feels swollen and cramping, 10/10 pain.  tylenol did not relieve pain.  Pain is better with standing and worse with sitting. 61 F pmh DM, HTN, HLD, CAD, esrd on HD t/th/sat, CVA w memory deficit p/w left leg pain at HD today.  Pt was dialyzed for 30mins then pain was too strong and came to ED.  Pt has had multiple similar episodes of leg pain and been admitted in past.  Leg feels swollen and cramping, 10/10 pain.  tylenol did not relieve pain.  Pain is better with standing and worse with sitting.  Pain had started last night and patient took her percocet last night with little relief. 61 F pmh DM, HTN, HLD, CAD, esrd on HD t/th/sat, CVA w memory deficit p/w left leg pain at HD today.  Pt was dialyzed for 30mins then pain was too strong and came to ED.  Pt has had multiple similar episodes of leg pain and been admitted in past.  Leg feels swollen and cramping, 10/10 pain.  tylenol did not relieve pain.  Pain is better with standing and worse with sitting.  Pain had started last night and patient took her percocet last night with little relief.  Dr murrieta - nephrologist

## 2017-06-04 LAB
ANION GAP SERPL CALC-SCNC: 19 MMOL/L — HIGH (ref 5–17)
APPEARANCE UR: CLEAR — SIGNIFICANT CHANGE UP
BILIRUB UR-MCNC: NEGATIVE — SIGNIFICANT CHANGE UP
BUN SERPL-MCNC: 18 MG/DL — SIGNIFICANT CHANGE UP (ref 7–23)
CALCIUM SERPL-MCNC: 8.6 MG/DL — SIGNIFICANT CHANGE UP (ref 8.4–10.5)
CHLORIDE SERPL-SCNC: 93 MMOL/L — LOW (ref 96–108)
CK SERPL-CCNC: 242 U/L — HIGH (ref 25–170)
CO2 SERPL-SCNC: 24 MMOL/L — SIGNIFICANT CHANGE UP (ref 22–31)
COLOR SPEC: YELLOW — SIGNIFICANT CHANGE UP
CREAT SERPL-MCNC: 3.99 MG/DL — HIGH (ref 0.5–1.3)
CRP SERPL-MCNC: 0.8 MG/DL — HIGH (ref 0–0.4)
DIFF PNL FLD: NEGATIVE — SIGNIFICANT CHANGE UP
EPI CELLS # UR: SIGNIFICANT CHANGE UP /HPF
ERYTHROCYTE [SEDIMENTATION RATE] IN BLOOD: 33 MM/HR — HIGH (ref 0–20)
GLUCOSE SERPL-MCNC: 184 MG/DL — HIGH (ref 70–99)
GLUCOSE UR QL: 1000
HCT VFR BLD CALC: 28.2 % — LOW (ref 34.5–45)
HGB BLD-MCNC: 9 G/DL — LOW (ref 11.5–15.5)
KETONES UR-MCNC: NEGATIVE — SIGNIFICANT CHANGE UP
LEUKOCYTE ESTERASE UR-ACNC: ABNORMAL
MCHC RBC-ENTMCNC: 31.5 PG — SIGNIFICANT CHANGE UP (ref 27–34)
MCHC RBC-ENTMCNC: 31.9 GM/DL — LOW (ref 32–36)
MCV RBC AUTO: 98.6 FL — SIGNIFICANT CHANGE UP (ref 80–100)
NITRITE UR-MCNC: NEGATIVE — SIGNIFICANT CHANGE UP
PH UR: 8.5 — HIGH (ref 5–8)
PLATELET # BLD AUTO: 249 K/UL — SIGNIFICANT CHANGE UP (ref 150–400)
POTASSIUM SERPL-MCNC: 4 MMOL/L — SIGNIFICANT CHANGE UP (ref 3.5–5.3)
POTASSIUM SERPL-SCNC: 4 MMOL/L — SIGNIFICANT CHANGE UP (ref 3.5–5.3)
PROT UR-MCNC: >600 MG/DL
RBC # BLD: 2.86 M/UL — LOW (ref 3.8–5.2)
RBC # FLD: 13.9 % — SIGNIFICANT CHANGE UP (ref 10.3–14.5)
RBC CASTS # UR COMP ASSIST: SIGNIFICANT CHANGE UP /HPF (ref 0–2)
SODIUM SERPL-SCNC: 136 MMOL/L — SIGNIFICANT CHANGE UP (ref 135–145)
SP GR SPEC: 1.01 — SIGNIFICANT CHANGE UP (ref 1.01–1.02)
UROBILINOGEN FLD QL: NEGATIVE — SIGNIFICANT CHANGE UP
WBC # BLD: 5.31 K/UL — SIGNIFICANT CHANGE UP (ref 3.8–10.5)
WBC # FLD AUTO: 5.31 K/UL — SIGNIFICANT CHANGE UP (ref 3.8–10.5)
WBC UR QL: SIGNIFICANT CHANGE UP /HPF (ref 0–5)

## 2017-06-04 PROCEDURE — 99222 1ST HOSP IP/OBS MODERATE 55: CPT

## 2017-06-04 RX ORDER — VANCOMYCIN HCL 1 G
1000 VIAL (EA) INTRAVENOUS ONCE
Qty: 0 | Refills: 0 | Status: COMPLETED | OUTPATIENT
Start: 2017-06-04 | End: 2017-06-04

## 2017-06-04 RX ORDER — VANCOMYCIN HCL 1 G
VIAL (EA) INTRAVENOUS
Qty: 0 | Refills: 0 | Status: DISCONTINUED | OUTPATIENT
Start: 2017-06-04 | End: 2017-06-04

## 2017-06-04 RX ADMIN — DULOXETINE HYDROCHLORIDE 60 MILLIGRAM(S): 30 CAPSULE, DELAYED RELEASE ORAL at 13:37

## 2017-06-04 RX ADMIN — Medication 100 MILLIGRAM(S): at 06:47

## 2017-06-04 RX ADMIN — CINACALCET 60 MILLIGRAM(S): 30 TABLET, FILM COATED ORAL at 13:37

## 2017-06-04 RX ADMIN — SEVELAMER CARBONATE 2400 MILLIGRAM(S): 2400 POWDER, FOR SUSPENSION ORAL at 18:38

## 2017-06-04 RX ADMIN — SEVELAMER CARBONATE 2400 MILLIGRAM(S): 2400 POWDER, FOR SUSPENSION ORAL at 08:50

## 2017-06-04 RX ADMIN — LISINOPRIL 40 MILLIGRAM(S): 2.5 TABLET ORAL at 06:56

## 2017-06-04 RX ADMIN — Medication 1 TABLET(S): at 13:37

## 2017-06-04 RX ADMIN — SEVELAMER CARBONATE 2400 MILLIGRAM(S): 2400 POWDER, FOR SUSPENSION ORAL at 13:37

## 2017-06-04 RX ADMIN — CLOPIDOGREL BISULFATE 75 MILLIGRAM(S): 75 TABLET, FILM COATED ORAL at 21:43

## 2017-06-04 RX ADMIN — Medication 100 MILLIGRAM(S): at 21:43

## 2017-06-04 RX ADMIN — Medication 50 MILLIGRAM(S): at 06:47

## 2017-06-04 RX ADMIN — Medication 250 MILLIGRAM(S): at 18:38

## 2017-06-04 RX ADMIN — Medication 100 MILLIGRAM(S): at 13:37

## 2017-06-04 RX ADMIN — ATORVASTATIN CALCIUM 20 MILLIGRAM(S): 80 TABLET, FILM COATED ORAL at 21:43

## 2017-06-04 RX ADMIN — Medication 81 MILLIGRAM(S): at 13:39

## 2017-06-04 RX ADMIN — Medication 40 MILLIGRAM(S): at 06:47

## 2017-06-05 LAB
ANION GAP SERPL CALC-SCNC: 19 MMOL/L — HIGH (ref 5–17)
BASOPHILS # BLD AUTO: 0.03 K/UL — SIGNIFICANT CHANGE UP (ref 0–0.2)
BASOPHILS NFR BLD AUTO: 0.7 % — SIGNIFICANT CHANGE UP (ref 0–2)
BUN SERPL-MCNC: 36 MG/DL — HIGH (ref 7–23)
CALCIUM SERPL-MCNC: 8.2 MG/DL — LOW (ref 8.4–10.5)
CHLORIDE SERPL-SCNC: 92 MMOL/L — LOW (ref 96–108)
CO2 SERPL-SCNC: 22 MMOL/L — SIGNIFICANT CHANGE UP (ref 22–31)
CREAT SERPL-MCNC: 6.15 MG/DL — HIGH (ref 0.5–1.3)
EOSINOPHIL # BLD AUTO: 0.2 K/UL — SIGNIFICANT CHANGE UP (ref 0–0.5)
EOSINOPHIL NFR BLD AUTO: 4.7 % — SIGNIFICANT CHANGE UP (ref 0–6)
GLUCOSE SERPL-MCNC: 153 MG/DL — HIGH (ref 70–99)
HCT VFR BLD CALC: 29.1 % — LOW (ref 34.5–45)
HGB BLD-MCNC: 8.9 G/DL — LOW (ref 11.5–15.5)
IMM GRANULOCYTES NFR BLD AUTO: 0 % — SIGNIFICANT CHANGE UP (ref 0–1.5)
LYMPHOCYTES # BLD AUTO: 1.06 K/UL — SIGNIFICANT CHANGE UP (ref 1–3.3)
LYMPHOCYTES # BLD AUTO: 24.8 % — SIGNIFICANT CHANGE UP (ref 13–44)
MCHC RBC-ENTMCNC: 30.6 GM/DL — LOW (ref 32–36)
MCHC RBC-ENTMCNC: 31.2 PG — SIGNIFICANT CHANGE UP (ref 27–34)
MCV RBC AUTO: 102.1 FL — HIGH (ref 80–100)
MONOCYTES # BLD AUTO: 0.49 K/UL — SIGNIFICANT CHANGE UP (ref 0–0.9)
MONOCYTES NFR BLD AUTO: 11.5 % — SIGNIFICANT CHANGE UP (ref 2–14)
NEUTROPHILS # BLD AUTO: 2.49 K/UL — SIGNIFICANT CHANGE UP (ref 1.8–7.4)
NEUTROPHILS NFR BLD AUTO: 58.3 % — SIGNIFICANT CHANGE UP (ref 43–77)
PLATELET # BLD AUTO: 262 K/UL — SIGNIFICANT CHANGE UP (ref 150–400)
POTASSIUM SERPL-MCNC: 4.8 MMOL/L — SIGNIFICANT CHANGE UP (ref 3.5–5.3)
POTASSIUM SERPL-SCNC: 4.8 MMOL/L — SIGNIFICANT CHANGE UP (ref 3.5–5.3)
RBC # BLD: 2.85 M/UL — LOW (ref 3.8–5.2)
RBC # FLD: 14.4 % — SIGNIFICANT CHANGE UP (ref 10.3–14.5)
SODIUM SERPL-SCNC: 133 MMOL/L — LOW (ref 135–145)
VANCOMYCIN TROUGH SERPL-MCNC: 16.9 UG/ML — SIGNIFICANT CHANGE UP (ref 10–20)
WBC # BLD: 4.27 K/UL — SIGNIFICANT CHANGE UP (ref 3.8–10.5)
WBC # FLD AUTO: 4.27 K/UL — SIGNIFICANT CHANGE UP (ref 3.8–10.5)

## 2017-06-05 PROCEDURE — 12345: CPT | Mod: NC

## 2017-06-05 PROCEDURE — 99232 SBSQ HOSP IP/OBS MODERATE 35: CPT

## 2017-06-05 RX ADMIN — Medication 100 MILLIGRAM(S): at 21:31

## 2017-06-05 RX ADMIN — LISINOPRIL 40 MILLIGRAM(S): 2.5 TABLET ORAL at 05:48

## 2017-06-05 RX ADMIN — SEVELAMER CARBONATE 2400 MILLIGRAM(S): 2400 POWDER, FOR SUSPENSION ORAL at 09:15

## 2017-06-05 RX ADMIN — Medication 81 MILLIGRAM(S): at 11:40

## 2017-06-05 RX ADMIN — Medication 100 MILLIGRAM(S): at 05:48

## 2017-06-05 RX ADMIN — CINACALCET 60 MILLIGRAM(S): 30 TABLET, FILM COATED ORAL at 11:40

## 2017-06-05 RX ADMIN — CLOPIDOGREL BISULFATE 75 MILLIGRAM(S): 75 TABLET, FILM COATED ORAL at 21:31

## 2017-06-05 RX ADMIN — SEVELAMER CARBONATE 2400 MILLIGRAM(S): 2400 POWDER, FOR SUSPENSION ORAL at 18:27

## 2017-06-05 RX ADMIN — DULOXETINE HYDROCHLORIDE 60 MILLIGRAM(S): 30 CAPSULE, DELAYED RELEASE ORAL at 11:40

## 2017-06-05 RX ADMIN — ATORVASTATIN CALCIUM 20 MILLIGRAM(S): 80 TABLET, FILM COATED ORAL at 21:31

## 2017-06-05 RX ADMIN — Medication 1 TABLET(S): at 11:40

## 2017-06-05 RX ADMIN — SEVELAMER CARBONATE 2400 MILLIGRAM(S): 2400 POWDER, FOR SUSPENSION ORAL at 13:56

## 2017-06-05 RX ADMIN — Medication 50 MILLIGRAM(S): at 05:48

## 2017-06-05 RX ADMIN — Medication 100 MILLIGRAM(S): at 13:56

## 2017-06-06 LAB
BASOPHILS # BLD AUTO: 0.02 K/UL — SIGNIFICANT CHANGE UP (ref 0–0.2)
BASOPHILS NFR BLD AUTO: 0.5 % — SIGNIFICANT CHANGE UP (ref 0–2)
EOSINOPHIL # BLD AUTO: 0.16 K/UL — SIGNIFICANT CHANGE UP (ref 0–0.5)
EOSINOPHIL NFR BLD AUTO: 3.7 % — SIGNIFICANT CHANGE UP (ref 0–6)
HCT VFR BLD CALC: 27.2 % — LOW (ref 34.5–45)
HGB BLD-MCNC: 8.4 G/DL — LOW (ref 11.5–15.5)
IMM GRANULOCYTES NFR BLD AUTO: 0.2 % — SIGNIFICANT CHANGE UP (ref 0–1.5)
LYMPHOCYTES # BLD AUTO: 0.98 K/UL — LOW (ref 1–3.3)
LYMPHOCYTES # BLD AUTO: 22.9 % — SIGNIFICANT CHANGE UP (ref 13–44)
MCHC RBC-ENTMCNC: 30.9 GM/DL — LOW (ref 32–36)
MCHC RBC-ENTMCNC: 31.6 PG — SIGNIFICANT CHANGE UP (ref 27–34)
MCV RBC AUTO: 102.3 FL — HIGH (ref 80–100)
MONOCYTES # BLD AUTO: 0.31 K/UL — SIGNIFICANT CHANGE UP (ref 0–0.9)
MONOCYTES NFR BLD AUTO: 7.2 % — SIGNIFICANT CHANGE UP (ref 2–14)
NEUTROPHILS # BLD AUTO: 2.8 K/UL — SIGNIFICANT CHANGE UP (ref 1.8–7.4)
NEUTROPHILS NFR BLD AUTO: 65.5 % — SIGNIFICANT CHANGE UP (ref 43–77)
PLATELET # BLD AUTO: 255 K/UL — SIGNIFICANT CHANGE UP (ref 150–400)
RBC # BLD: 2.66 M/UL — LOW (ref 3.8–5.2)
RBC # FLD: 14.6 % — HIGH (ref 10.3–14.5)
WBC # BLD: 4.28 K/UL — SIGNIFICANT CHANGE UP (ref 3.8–10.5)
WBC # FLD AUTO: 4.28 K/UL — SIGNIFICANT CHANGE UP (ref 3.8–10.5)

## 2017-06-06 PROCEDURE — 99232 SBSQ HOSP IP/OBS MODERATE 35: CPT

## 2017-06-06 RX ORDER — VANCOMYCIN HCL 1 G
1000 VIAL (EA) INTRAVENOUS ONCE
Qty: 0 | Refills: 0 | Status: COMPLETED | OUTPATIENT
Start: 2017-06-06 | End: 2017-06-06

## 2017-06-06 RX ADMIN — CLOPIDOGREL BISULFATE 75 MILLIGRAM(S): 75 TABLET, FILM COATED ORAL at 21:25

## 2017-06-06 RX ADMIN — CINACALCET 60 MILLIGRAM(S): 30 TABLET, FILM COATED ORAL at 13:29

## 2017-06-06 RX ADMIN — Medication 1 TABLET(S): at 13:29

## 2017-06-06 RX ADMIN — DULOXETINE HYDROCHLORIDE 60 MILLIGRAM(S): 30 CAPSULE, DELAYED RELEASE ORAL at 13:29

## 2017-06-06 RX ADMIN — ATORVASTATIN CALCIUM 20 MILLIGRAM(S): 80 TABLET, FILM COATED ORAL at 21:25

## 2017-06-06 RX ADMIN — Medication 81 MILLIGRAM(S): at 13:30

## 2017-06-06 RX ADMIN — Medication 100 MILLIGRAM(S): at 21:25

## 2017-06-06 RX ADMIN — SEVELAMER CARBONATE 2400 MILLIGRAM(S): 2400 POWDER, FOR SUSPENSION ORAL at 18:07

## 2017-06-06 RX ADMIN — SEVELAMER CARBONATE 2400 MILLIGRAM(S): 2400 POWDER, FOR SUSPENSION ORAL at 13:29

## 2017-06-06 RX ADMIN — SEVELAMER CARBONATE 2400 MILLIGRAM(S): 2400 POWDER, FOR SUSPENSION ORAL at 08:28

## 2017-06-06 RX ADMIN — Medication 250 MILLIGRAM(S): at 14:01

## 2017-06-06 RX ADMIN — Medication 100 MILLIGRAM(S): at 13:28

## 2017-06-07 DIAGNOSIS — E10.9 TYPE 1 DIABETES MELLITUS WITHOUT COMPLICATIONS: ICD-10-CM

## 2017-06-07 DIAGNOSIS — I73.9 PERIPHERAL VASCULAR DISEASE, UNSPECIFIED: ICD-10-CM

## 2017-06-07 DIAGNOSIS — L03.90 CELLULITIS, UNSPECIFIED: ICD-10-CM

## 2017-06-07 DIAGNOSIS — I10 ESSENTIAL (PRIMARY) HYPERTENSION: ICD-10-CM

## 2017-06-07 LAB
ANION GAP SERPL CALC-SCNC: 16 MMOL/L — SIGNIFICANT CHANGE UP (ref 5–17)
ANION GAP SERPL CALC-SCNC: 27 MMOL/L — HIGH (ref 5–17)
BUN SERPL-MCNC: 26 MG/DL — HIGH (ref 7–23)
BUN SERPL-MCNC: 52 MG/DL — HIGH (ref 7–23)
CALCIUM SERPL-MCNC: 7.9 MG/DL — LOW (ref 8.4–10.5)
CALCIUM SERPL-MCNC: 8 MG/DL — LOW (ref 8.4–10.5)
CHLORIDE SERPL-SCNC: 90 MMOL/L — LOW (ref 96–108)
CHLORIDE SERPL-SCNC: 98 MMOL/L — SIGNIFICANT CHANGE UP (ref 96–108)
CO2 SERPL-SCNC: 17 MMOL/L — LOW (ref 22–31)
CO2 SERPL-SCNC: 21 MMOL/L — LOW (ref 22–31)
CREAT SERPL-MCNC: 4.81 MG/DL — HIGH (ref 0.5–1.3)
CREAT SERPL-MCNC: 8.18 MG/DL — HIGH (ref 0.5–1.3)
GLUCOSE SERPL-MCNC: 125 MG/DL — HIGH (ref 70–99)
GLUCOSE SERPL-MCNC: 153 MG/DL — HIGH (ref 70–99)
HCT VFR BLD CALC: 29.5 % — LOW (ref 34.5–45)
HGB BLD-MCNC: 9.4 G/DL — LOW (ref 11.5–15.5)
MCHC RBC-ENTMCNC: 31.9 GM/DL — LOW (ref 32–36)
MCHC RBC-ENTMCNC: 32.1 PG — SIGNIFICANT CHANGE UP (ref 27–34)
MCV RBC AUTO: 100.7 FL — HIGH (ref 80–100)
PLATELET # BLD AUTO: 259 K/UL — SIGNIFICANT CHANGE UP (ref 150–400)
POTASSIUM SERPL-MCNC: 4.5 MMOL/L — SIGNIFICANT CHANGE UP (ref 3.5–5.3)
POTASSIUM SERPL-MCNC: 4.7 MMOL/L — SIGNIFICANT CHANGE UP (ref 3.5–5.3)
POTASSIUM SERPL-SCNC: 4.5 MMOL/L — SIGNIFICANT CHANGE UP (ref 3.5–5.3)
POTASSIUM SERPL-SCNC: 4.7 MMOL/L — SIGNIFICANT CHANGE UP (ref 3.5–5.3)
RBC # BLD: 2.93 M/UL — LOW (ref 3.8–5.2)
RBC # FLD: 14.3 % — SIGNIFICANT CHANGE UP (ref 10.3–14.5)
SODIUM SERPL-SCNC: 134 MMOL/L — LOW (ref 135–145)
SODIUM SERPL-SCNC: 135 MMOL/L — SIGNIFICANT CHANGE UP (ref 135–145)
WBC # BLD: 5.07 K/UL — SIGNIFICANT CHANGE UP (ref 3.8–10.5)
WBC # FLD AUTO: 5.07 K/UL — SIGNIFICANT CHANGE UP (ref 3.8–10.5)

## 2017-06-07 PROCEDURE — 93925 LOWER EXTREMITY STUDY: CPT | Mod: 26

## 2017-06-07 RX ORDER — INSULIN ASPART 100 [IU]/ML
1 INJECTION, SOLUTION SUBCUTANEOUS
Qty: 0 | Refills: 0 | Status: DISCONTINUED | OUTPATIENT
Start: 2017-06-07 | End: 2017-06-09

## 2017-06-07 RX ADMIN — SEVELAMER CARBONATE 2400 MILLIGRAM(S): 2400 POWDER, FOR SUSPENSION ORAL at 16:29

## 2017-06-07 RX ADMIN — SEVELAMER CARBONATE 2400 MILLIGRAM(S): 2400 POWDER, FOR SUSPENSION ORAL at 09:08

## 2017-06-07 RX ADMIN — SEVELAMER CARBONATE 2400 MILLIGRAM(S): 2400 POWDER, FOR SUSPENSION ORAL at 18:19

## 2017-06-07 RX ADMIN — Medication 50 MILLIGRAM(S): at 05:13

## 2017-06-07 RX ADMIN — CINACALCET 60 MILLIGRAM(S): 30 TABLET, FILM COATED ORAL at 16:28

## 2017-06-07 RX ADMIN — Medication 100 MILLIGRAM(S): at 16:29

## 2017-06-07 RX ADMIN — Medication 81 MILLIGRAM(S): at 16:26

## 2017-06-07 RX ADMIN — ATORVASTATIN CALCIUM 20 MILLIGRAM(S): 80 TABLET, FILM COATED ORAL at 21:10

## 2017-06-07 RX ADMIN — Medication 100 MILLIGRAM(S): at 21:10

## 2017-06-07 RX ADMIN — DULOXETINE HYDROCHLORIDE 60 MILLIGRAM(S): 30 CAPSULE, DELAYED RELEASE ORAL at 16:28

## 2017-06-07 RX ADMIN — LISINOPRIL 40 MILLIGRAM(S): 2.5 TABLET ORAL at 05:13

## 2017-06-07 RX ADMIN — Medication 100 MILLIGRAM(S): at 05:13

## 2017-06-07 RX ADMIN — CLOPIDOGREL BISULFATE 75 MILLIGRAM(S): 75 TABLET, FILM COATED ORAL at 21:10

## 2017-06-07 RX ADMIN — Medication 1 TABLET(S): at 16:29

## 2017-06-07 NOTE — PROVIDER CONTACT NOTE (MEDICATION) - RECOMMENDATIONS
SCD
Denies
hold heparin per pt request educated on dvt ppx verbalized understanding continued to refuse

## 2017-06-07 NOTE — PROGRESS NOTE ADULT - SUBJECTIVE AND OBJECTIVE BOX
[facility]   [phone]  RENAL FOLLOW UP NOTE  --------------------------------------------------------------------------------  Chief Complaint: esrd.  24 hour events/subjective: still with pain left leg. no other new c/o are offered        PAST HISTORY  --------------------------------------------------------------------------------  No significant changes to PMH, PSH, FHx, SHx, unless otherwise noted    ALLERGIES & MEDICATIONS  --------------------------------------------------------------------------------  Allergies    No Known Allergies    Intolerances      Standing Inpatient Medications  dextrose 5%. 1000milliLiter(s) IV Continuous <Continuous>  dextrose 50% Injectable 12.5Gram(s) IV Push once  dextrose 50% Injectable 25Gram(s) IV Push once  dextrose 50% Injectable 25Gram(s) IV Push once  insulin aspart (NovoLOG) Pump 1Each SubCutaneous Continuous Pump  heparin  Injectable 5000Unit(s) SubCutaneous every 12 hours  aspirin enteric coated 81milliGRAM(s) Oral daily  lisinopril 40milliGRAM(s) Oral daily  DULoxetine 60milliGRAM(s) Oral daily  atorvastatin 20milliGRAM(s) Oral at bedtime  clopidogrel Tablet 75milliGRAM(s) Oral at bedtime  metoprolol succinate ER 50milliGRAM(s) Oral daily  furosemide    Tablet 40milliGRAM(s) Oral every 48 hours  cinacalcet 60milliGRAM(s) Oral daily  sevelamer hydrochloride 2400milliGRAM(s) Oral three times a day with meals  hydrALAZINE 100milliGRAM(s) Oral every 8 hours  multivitamin 1Tablet(s) Oral daily    PRN Inpatient Medications  dextrose Gel 1Dose(s) Oral once PRN  glucagon  Injectable 1milliGRAM(s) IntraMuscular once PRN  acetaminophen   Tablet. 650milliGRAM(s) Oral every 6 hours PRN  oxyCODONE  5 mG/acetaminophen 325 mG 1Tablet(s) Oral every 4 hours PRN  HYDROmorphone  Injectable 0.5milliGRAM(s) IV Push every 6 hours PRN      REVIEW OF SYSTEMS  --------------------------------------------------------------------------------  Gen: denies fatigue, fevers/chills, weakness  Skin: No rashes  Head/Eyes/Ears/Mouth: No headache;No sore throat  Respiratory: No dyspnea, cough,   CV: No chest pain, PND, orthopnea  GI: No abdominal pain, diarrhea, constipation, nausea, vomiting  : does not urinate no hematuria.   Neuro: No dizziness/lightheadedness, weakness, + neuropathy and + tingling    + edema left leg redness better per pt    All other systems were reviewed and are negative, except as noted.    VITALS/PHYSICAL EXAM  --------------------------------------------------------------------------------  T(C): 36.7, Max: 36.7 (06-06 @ 20:55)  HR: 64 (64 - 83)  BP: 190/88 (164/72 - 190/88)  RR: 18 (18 - 18)  SpO2: 98% (98% - 99%)  Wt(kg): --        I & Os for current day (as of 06-07-17 @ 17:55)  =============================================  IN: 990 ml / OUT: 2600 ml / NET: -1610 ml    Physical Exam:  	Gen: NAD, well-appearing  	HEENT: supple no jvd  	Pulm: CTA B/L no wheezing or rales  	CV: RRR, S1S2; no rub  	Back: no sacral edema  	Abd: +BS, soft, nontender/nondistended                      Transplant: No tenderness, swelling  	: No suprapubic tenderness  	  	LE: Warm, FROM, no edema RLE. LLE 2+ edema no erythema noted.   	    LABS/STUDIES  --------------------------------------------------------------------------------              9.4    5.07  >-----------<  259      [06-07-17 @ 07:18]              29.5     135  |  98  |  26  ----------------------------<  153      [06-07-17 @ 07:18]  4.7   |  21  |  4.81        Ca     8.0     [06-07-17 @ 07:18]            Creatinine Trend:  SCr 4.81 [06-07 @ 07:18]  SCr 8.18 [06-06 @ 13:56]  SCr 6.15 [06-05 @ 08:36]  SCr 3.99 [06-04 @ 08:24]  SCr 7.15 [06-03 @ 14:10]              Urinalysis - [06-04-17 @ 08:24]      Color Yellow / Appearance Clear / SG 1.013 / pH 8.5      Gluc 1000 / Ketone Negative  / Bili Negative / Urobili Negative       Blood Negative / Protein >600 / Leuk Est Small / Nitrite Negative      RBC 3-5 / WBC 6-10 / Hyaline  / Gran  / Sq Epi  / Non Sq Epi OCC / Bacteria       PTH -- (Ca 8.7)      [06-19-16 @ 09:29]   773  Vitamin D (25OH) 40.7      [06-19-16 @ 09:29]  HbA1c 8.5      [04-11-17 @ 07:12]  TSH 4.06      [06-08-16 @ 07:24]  Lipid: chol 120, TG 83, HDL 37, LDL 66      [06-08-16 @ 08:55]

## 2017-06-07 NOTE — PROGRESS NOTE ADULT - SUBJECTIVE AND OBJECTIVE BOX
Diabetes Follow up note:  Interval Hx: 60 y/o F with T1DM with multiple complications and comorbidities. Pt on Metronic insulin pump. Here with LLE severe pain and edema. Had Doppler study done this pm    Glycemic control variable with hypoglycemia last evening. Pt states she dosed insulin for dinner but then she disliked dinner and didn't eat much causing hypoglycemia. Today BG levels mostly at goal except for this pm with mild hyperglycemia.    Allergies    No Known Allergies    Intolerances      Review of Systems:  GI: Tolerating POs without any N/V/D/ABD PAIN.  CV: No CP/SOB  ENDO: No S&Sx of hypoglycemia. Pt has hypoglycemia unawareness      DM MEDS:  insulin aspart (NovoLOG) Pump 1Each SubCutaneous Continuous Pump  metronic insulin pump settings:  Basal: 12MN >0.4             3:30AM >0.5            6AM>0.425  I:C Ratios: 12MN >9                   11AM>10                   3PM>9  ISF: 12MN 60          7AM 40          6PM 60  TBG 12MN 120          8AM 110    OTHER MEDS:    atorvastatin 20milliGRAM(s) Oral at bedtime        PE:  Vital Signs Last 24 Hrs  T(C): 36.7, Max: 36.7 (06-06 @ 20:55)  T(F): 98, Max: 98.1 (06-06 @ 20:55)  HR: 64 (64 - 83)  BP: 190/88 (164/72 - 190/88)  BP(mean): --  RR: 18 (18 - 18)  SpO2: 98% (98% - 99%)  Abd: Soft, NT, ND, Central adiposity  Extremities: Warm  Neuro: A&O X3    LABS:  CAPILLARY BLOOD GLUCOSE  234 (06-07 @ 16:22)  180 (06-07 @ 08:52)  131 (06-07 @ 03:05)  133 (06-06 @ 22:10)  72 (06-06 @ 21:55)  67 (06-06 @ 21:30)  67 (06-06 @ 20:55)  215 (06-06 @ 17:26)  170 (06-06 @ 13:09)  127 (06-06 @ 07:34)  123 (06-06 @ 03:17)  69 (06-06 @ 03:04)  53 (06-06 @ 03:03)      06-07    135  |  98  |  26<H>  ----------------------------<  153<H>  4.7   |  21<L>  |  4.81<H>    EGFR if : 11<L>  EGFR if non : 9<L>    Ca    8.0<L>      06-07        Thyroid Function Tests:      Hemoglobin A1C, Whole Blood: 8.5 % <H> [4.0 - 5.6] (04-11 @ 07:12) Diabetes Follow up note:  Interval Hx: 62 y/o F with T1DM with multiple complications and comorbidities. Well known to DM team with frequent admissions  Pt on Metronic insulin pump. Here with LLE severe pain and edema. Had Doppler study done this pm    Glycemic control variable with hypoglycemia last evening. Pt states she dosed insulin for dinner but then she disliked dinner and didn't eat much causing hypoglycemia. Today BG levels mostly at goal except for this pm with mild hyperglycemia.    Allergies    No Known Allergies    Intolerances      Review of Systems:  GI: Tolerating POs without any N/V/D/ABD PAIN.  CV: No CP/SOB  ENDO: No S&Sx of hypoglycemia. Pt has hypoglycemia unawareness  EXTREMITIES: c/o tendernesin LLE but states edema improving      DM MEDS:  insulin aspart (NovoLOG) Pump 1Each SubCutaneous   Continuous Pump Metronic insulin pump settings:  Basal: 12MN >0.4             3:30AM >0.5            6AM>0.425  I:C Ratios: 12MN >9                   11AM>10                   3PM>9  ISF: 12MN 60          7AM 40          6PM 60  TBG 12MN 120          8AM 110    OTHER MEDS:    atorvastatin 20milliGRAM(s) Oral at bedtime        PE:  Vital Signs Last 24 Hrs  T(C): 36.7, Max: 36.7 (06-06 @ 20:55)  T(F): 98, Max: 98.1 (06-06 @ 20:55)  HR: 64 (64 - 83)  BP: 190/88 (164/72 - 190/88)  BP(mean): --  RR: 18 (18 - 18)  SpO2: 98% (98% - 99%)  Abd: Soft, NT, ND, insulin pump insertion in R quadrant  Extremities: Warm. No redeness. LLE edema with some tenderness at touch. No signs of infection noted  Neuro: A&O X3.  at bedside.    LABS:  CAPILLARY BLOOD GLUCOSE  234 (06-07 @ 16:22)  180 (06-07 @ 08:52)  131 (06-07 @ 03:05)  133 (06-06 @ 22:10)  72 (06-06 @ 21:55)  67 (06-06 @ 21:30)  67 (06-06 @ 20:55)  215 (06-06 @ 17:26)  170 (06-06 @ 13:09)  127 (06-06 @ 07:34)  123 (06-06 @ 03:17)  69 (06-06 @ 03:04)  53 (06-06 @ 03:03)      06-07    135  |  98  |  26<H>  ----------------------------<  153<H>  4.7   |  21<L>  |  4.81<H>    EGFR if : 11<L>  EGFR if non : 9<L>    Ca    8.0<L>      06-07        Thyroid Function Tests:      Hemoglobin A1C, Whole Blood: 8.5 % <H> [4.0 - 5.6] (04-11 @ 07:12)

## 2017-06-07 NOTE — ADVANCED PRACTICE NURSE CONSULT - REASON FOR CONSULT
62 y/o female with pmh T1DM, HTN, HLD, CAD, ESRD on HD Tues/Thurs/Sat, CVA w memory deficit p/w left leg pain at HD today.  Pt was dialyzed for 30mins then pain was too strong and came to ED on 6/3/17.  Pt has had multiple similar episodes of leg pain and been admitted in past.  Leg felt swollen and cramping with 10/10 pain.  Tylenol did not relieve pain.  Pain is better with standing and worse with sitting.  Patient took Percocet with little relief.  Pt consulted for longstanding T1DM and insulin pump therapy.

## 2017-06-07 NOTE — PROVIDER CONTACT NOTE (OTHER) - ASSESSMENT
Pt. finger stick 68, pt had 2 apple juice cookies and 8 oz of milk. Pt. refused to continue eating and drinking. Pt. denies s/s of hypoglycemia.

## 2017-06-07 NOTE — ADVANCED PRACTICE NURSE CONSULT - RECOMMEDATIONS
Primary RN to f/u with BG monitoring, making sure pt consumes carb consistent meals, insulin pump site assessment, monitoring S/S hypo/hyperglycemia and tracking carb intake, meal/correction boluses. Insulin pump forms all completed but attestation form needs to be signed by attending physician.      DSME provided regarding S/S, prevention and appropriate treatment of hyperglycemia and hypoglycemia.  Pt aware that she should only use hospital insulin and glucometer while in hospital. Pt verbalizes adequate understanding of all instructions via the teach-back method.

## 2017-06-07 NOTE — PROGRESS NOTE ADULT - ASSESSMENT
60 y/o F with T1DM with multiple complications and comorbidities. Pt on Metronic insulin pump. Here with LLE severe pain and edema. 60 y/o F with T1DM with multiple complications and comorbidities. Pt on Metronic insulin pump. Here with LLE severe pain and edema. S/p doppler this pm. Hypoglycemic last night after bolusing insulin but not eating enough. BG stable today. BG goal for this pt is to prevent hypoglycemia and persistent hyperglycemia.

## 2017-06-07 NOTE — PROVIDER CONTACT NOTE (MEDICATION) - ACTION/TREATMENT ORDERED:
no new orders
No further orders received at this time
hold heparin per pt request. continue to educate patient

## 2017-06-07 NOTE — PROVIDER CONTACT NOTE (OTHER) - ACTION/TREATMENT ORDERED:
SARAH Gruber aware that diabetes protocol was follow.  instruct to take the finger stick in ten minutes. Will continue to monitor.

## 2017-06-07 NOTE — PROGRESS NOTE ADULT - PROBLEM SELECTOR PLAN 1
-Continue use of insulin pump at present settings -Continue use of insulin pump at present settings  -pt due to change insulin pump site tomorrow  -For any questions or concerns regarding insulin pump please contact endocrine team at  (24/7)  -Plan discussed with pt and team

## 2017-06-07 NOTE — PROVIDER CONTACT NOTE (MEDICATION) - ASSESSMENT
pt. axox4, pt. refuses heparin. Explain to pt. the benefits and purpose of the medication, pt. verbalized understanding, but continue to refuse.
Denies
pt a&ox4 able to make needs known ambulates frequently around unit. vitals wdl. c/o LLE pain

## 2017-06-07 NOTE — PROGRESS NOTE ADULT - ASSESSMENT
1- ESRD  2- HTN   3- CAD  4- PAD  5- SHPT    hd am   cont with coreg and hydralazine.   cont with lisinopril monitor bp  renvela and sensipar with meals.   check pth   check phos  renal diet  cont with iv vanco  vascular follow up for PREETHI stenosis 1- ESRD  2- HTN   3- CAD  4- PAD  5- SHPT  6- CELLULITIS     hd am   cont with coreg and hydralazine.   cont with lisinopril monitor bp  renvela and sensipar with meals.   check pth   check phos  renal diet  cont with iv vanco  vascular follow up for PREETHI stenosis

## 2017-06-07 NOTE — PROGRESS NOTE ADULT - SUBJECTIVE AND OBJECTIVE BOX
CC: f/u for LLE pain and swelling    Patient reports no new c/o, but persistent leg pain    REVIEW OF SYSTEMS: no fevers, no chills, no nausea, no vomiting, no abd pain, no resp symptoms  All other review of systems negative (Comprehensive ROS)    Antimicrobials Day #4 Vanco by levels    Other Medications Reviewed    T(F): 98, Max: 98.1 (06-06 @ 20:55)  HR: 83  BP: 165/74  RR: 18  SpO2: 98%  Wt(kg): --    PHYSICAL EXAM:  General: alert, no acute distress  Eyes:  anicteric, no conjunctival injection, no discharge  Oropharynx: no lesions or injection 	  Neck: supple, without adenopathy  Lungs: clear to auscultation  Heart: regular rate and rhythm; no murmur, rubs or gallops  Abdomen: soft, nondistended, nontender, without mass or organomegaly  Skin: no lesions  Extremities: no clubbing, cyanosis, decreased edema LLE  Neurologic: alert, oriented, moves all extremities    LAB RESULTS:                        9.4    5.07  )-----------( 259      ( 07 Jun 2017 07:18 )             29.5     06-07    135  |  98  |  26<H>  ----------------------------<  153<H>  4.7   |  21<L>  |  4.81<H>    Ca    8.0<L>      07 Jun 2017 07:18            MICROBIOLOGY REVIEWED:    RADIOLOGY REVIEWED:

## 2017-06-07 NOTE — PROVIDER CONTACT NOTE (MEDICATION) - BACKGROUND
61 F pmh DM, HTN, HLD, CAD, esrd on HD t/th/sat, CVA w memory deficit p/w left leg pain at HD today.  Pt was dialyzed for 30mins then pain was too strong and came to ED.
patient admitted on 6/3/17 with pain in LLE. hx CAD, DM 1 on insulin pump, htn, HLD, TIA, gout, CVA
CAD, TIA, DM TYPE1, ACS, HTN, CVA

## 2017-06-07 NOTE — ADVANCED PRACTICE NURSE CONSULT - ASSESSMENT
Pt consulted for T1DM and insulin pump therapy after experiencing low last night.  She was hypoglycemic to 67 mg/dl after bolusing herself prior to dinner and not eating. Corrected to 133. Pt states she administered her meal bolus last night expecting to eat.  However, the food was too spicy and "disgusting" for her and she couldn’t eat it.  She made the Primary RN aware and they bought her another tray but at this point she felt extremely nauseous due to the first bite from previous tray.  She was given juice and cookies for the low and BG FS have remained in the 130’s.  Pt states she usually eats all her food and it was the first time she was eating this.  Pt instructed to try tasting food first before bolusing if she doesn’t recognize the food and she agrees.     Pt last changed site on 6/4/17 and due to change her site today.   Insulin pump infusion set site on left abdomen and remains clean dry, and intact.  Pt has sufficient insulin pump supplies. Insulin pump forms in chart.   Current insulin pump rates as follows;    Basal Rates:  0000 at 0.4 units /hr  0330 at 0.5 units/hr  0600 at 0.425 units/hr  Insulin carb ratio  0000 :1.9 units/gram  1100:  1.10unit/gram  1500: 1.9 units/gram    Insulin sensitivity factor  0000 ISF 60  0700 ISF 40  1800 ISF 60    Blood Glucose Target   0000: 110-130 mg/dl  0800:  100-120    Active Insulin Time:  6 hours

## 2017-06-07 NOTE — PROGRESS NOTE ADULT - SUBJECTIVE AND OBJECTIVE BOX
Patient is a 61y old  Female who presents with a chief complaint of pain  and swelling in left leg (04 Jun 2017 03:44)      SUBJECTIVE / OVERNIGHT EVENTS:Feels better. Imroved leg pain.  Review of Systems  chest pain no  palpitations no  sob no  nausea no  headache no    MEDICATIONS  (STANDING):  dextrose 5%. 1000milliLiter(s) IV Continuous <Continuous>  dextrose 50% Injectable 12.5Gram(s) IV Push once  dextrose 50% Injectable 25Gram(s) IV Push once  dextrose 50% Injectable 25Gram(s) IV Push once  insulin aspart (NovoLOG) Pump 1Each SubCutaneous Continuous Pump  heparin  Injectable 5000Unit(s) SubCutaneous every 12 hours  aspirin enteric coated 81milliGRAM(s) Oral daily  lisinopril 40milliGRAM(s) Oral daily  DULoxetine 60milliGRAM(s) Oral daily  atorvastatin 20milliGRAM(s) Oral at bedtime  clopidogrel Tablet 75milliGRAM(s) Oral at bedtime  metoprolol succinate ER 50milliGRAM(s) Oral daily  furosemide    Tablet 40milliGRAM(s) Oral every 48 hours  cinacalcet 60milliGRAM(s) Oral daily  sevelamer hydrochloride 2400milliGRAM(s) Oral three times a day with meals  hydrALAZINE 100milliGRAM(s) Oral every 8 hours  multivitamin 1Tablet(s) Oral daily    MEDICATIONS  (PRN):  dextrose Gel 1Dose(s) Oral once PRN Blood Glucose LESS THAN 70 milliGRAM(s)/deciliter  glucagon  Injectable 1milliGRAM(s) IntraMuscular once PRN Glucose LESS THAN 70 milligrams/deciliter  acetaminophen   Tablet. 650milliGRAM(s) Oral every 6 hours PRN Mild Pain (1 - 3)  oxyCODONE  5 mG/acetaminophen 325 mG 1Tablet(s) Oral every 4 hours PRN Moderate Pain (4 - 6)  HYDROmorphone  Injectable 0.5milliGRAM(s) IV Push every 6 hours PRN Severe Pain (7 - 10)      Vital Signs Last 24 Hrs  T(C): 36.7, Max: 36.7 (06-06 @ 20:55)  HR: 64 (64 - 83)  BP: 190/88 (164/72 - 190/88)  RR: 18 (18 - 18)  SpO2: 98% (98% - 99%)  Wt(kg): --  CAPILLARY BLOOD GLUCOSE  234 (07 Jun 2017 16:22)  180 (07 Jun 2017 08:52)  131 (07 Jun 2017 03:05)  133 (06 Jun 2017 22:10)  72 (06 Jun 2017 21:55)  67 (06 Jun 2017 21:30)  67 (06 Jun 2017 20:55)    I&O's Summary    I & Os for current day (as of 07 Jun 2017 18:05)  =============================================  IN: 990 ml / OUT: 2600 ml / NET: -1610 ml      PHYSICAL EXAM:  GENERAL: NAD, well-developed  HEAD:  Atraumatic, Normocephalic  EYES: EOMI, PERRLA, conjunctiva and sclera clear  NECK: Supple, No JVD  CHEST/LUNG: Clear to auscultation bilaterally; No wheeze  HEART: Regular rate and rhythm; No murmurs, rubs, or gallops  ABDOMEN: Soft, Nontender, Nondistended; Bowel sounds present  EXTREMITIES:  2+ Peripheral Pulses,Imroved left leg erythema /edema.  PSYCH: AAOx3  NEUROLOGY: non-focal  SKIN: No rashes or lesions    LABS:                        9.4    5.07  )-----------( 259      ( 07 Jun 2017 07:18 )             29.5     06-07    135  |  98  |  26<H>  ----------------------------<  153<H>  4.7   |  21<L>  |  4.81<H>    Ca    8.0<L>      07 Jun 2017 07:18                RADIOLOGY & ADDITIONAL TESTS:    Imaging Personally Reviewed:    Consultant(s) Notes Reviewed:  yes    Care Discussed with Consultants/Other Providers:

## 2017-06-08 PROCEDURE — 99233 SBSQ HOSP IP/OBS HIGH 50: CPT

## 2017-06-08 RX ORDER — ERYTHROPOIETIN 10000 [IU]/ML
10000 INJECTION, SOLUTION INTRAVENOUS; SUBCUTANEOUS ONCE
Qty: 0 | Refills: 0 | Status: COMPLETED | OUTPATIENT
Start: 2017-06-08 | End: 2017-06-08

## 2017-06-08 RX ADMIN — SEVELAMER CARBONATE 2400 MILLIGRAM(S): 2400 POWDER, FOR SUSPENSION ORAL at 15:18

## 2017-06-08 RX ADMIN — ERYTHROPOIETIN 10000 UNIT(S): 10000 INJECTION, SOLUTION INTRAVENOUS; SUBCUTANEOUS at 10:45

## 2017-06-08 RX ADMIN — Medication 100 MILLIGRAM(S): at 06:07

## 2017-06-08 RX ADMIN — CLOPIDOGREL BISULFATE 75 MILLIGRAM(S): 75 TABLET, FILM COATED ORAL at 21:16

## 2017-06-08 RX ADMIN — Medication 100 MILLIGRAM(S): at 21:16

## 2017-06-08 RX ADMIN — Medication 1 TABLET(S): at 12:35

## 2017-06-08 RX ADMIN — LISINOPRIL 40 MILLIGRAM(S): 2.5 TABLET ORAL at 06:07

## 2017-06-08 RX ADMIN — SEVELAMER CARBONATE 2400 MILLIGRAM(S): 2400 POWDER, FOR SUSPENSION ORAL at 18:07

## 2017-06-08 RX ADMIN — Medication 40 MILLIGRAM(S): at 06:07

## 2017-06-08 RX ADMIN — SEVELAMER CARBONATE 2400 MILLIGRAM(S): 2400 POWDER, FOR SUSPENSION ORAL at 08:10

## 2017-06-08 RX ADMIN — CINACALCET 60 MILLIGRAM(S): 30 TABLET, FILM COATED ORAL at 12:35

## 2017-06-08 RX ADMIN — ATORVASTATIN CALCIUM 20 MILLIGRAM(S): 80 TABLET, FILM COATED ORAL at 21:15

## 2017-06-08 RX ADMIN — Medication 100 MILLIGRAM(S): at 15:19

## 2017-06-08 RX ADMIN — Medication 50 MILLIGRAM(S): at 06:07

## 2017-06-08 RX ADMIN — DULOXETINE HYDROCHLORIDE 60 MILLIGRAM(S): 30 CAPSULE, DELAYED RELEASE ORAL at 12:35

## 2017-06-08 RX ADMIN — Medication 81 MILLIGRAM(S): at 12:35

## 2017-06-08 NOTE — PROGRESS NOTE ADULT - SUBJECTIVE AND OBJECTIVE BOX
GENERAL SURGERY DAILY PROGRESS NOTE:   60 y/o female with LLE pain and erythema   Hospital Day:5      Subjective: No acute events overnight, resting comfortably. Pain well controlled    Objective:    PE:  NAD , AxO x 3  Lungs clear to auscultation b/l  RRR, no murmurs, rubs or gallops  Abdomen soft, NT, ND  B/L lower extremities warm to touch. No erythema or skin changes. Range of motion intact. LLE tender to palpation, 1+ DP/PT pulses palpated. RLE 1+ DP/PT pulses     Vital Signs Last 24 Hrs  T(C): 36.6, Max: 36.7 (06-07 @ 16:24)  T(F): 97.8, Max: 98 (06-07 @ 16:24)  HR: 74 (64 - 76)  BP: 159/68 (159/68 - 190/88)  BP(mean): --  RR: 18 (18 - 18)  SpO2: 98% (95% - 99%)    I&O's Detail    I & Os for current day (as of 08 Jun 2017 09:40)  =============================================  IN:    Oral Fluid: 120 ml    Total IN: 120 ml  ---------------------------------------------  OUT:    Total OUT: 0 ml  ---------------------------------------------  Total NET: 120 ml      Daily Height in cm: 162.56 (08 Jun 2017 08:00)    Daily     MEDICATIONS  (STANDING):  dextrose 5%. 1000milliLiter(s) IV Continuous <Continuous>  dextrose 50% Injectable 12.5Gram(s) IV Push once  dextrose 50% Injectable 25Gram(s) IV Push once  dextrose 50% Injectable 25Gram(s) IV Push once  heparin  Injectable 5000Unit(s) SubCutaneous every 12 hours  aspirin enteric coated 81milliGRAM(s) Oral daily  lisinopril 40milliGRAM(s) Oral daily  DULoxetine 60milliGRAM(s) Oral daily  atorvastatin 20milliGRAM(s) Oral at bedtime  clopidogrel Tablet 75milliGRAM(s) Oral at bedtime  metoprolol succinate ER 50milliGRAM(s) Oral daily  furosemide    Tablet 40milliGRAM(s) Oral every 48 hours  cinacalcet 60milliGRAM(s) Oral daily  sevelamer hydrochloride 2400milliGRAM(s) Oral three times a day with meals  hydrALAZINE 100milliGRAM(s) Oral every 8 hours  multivitamin 1Tablet(s) Oral daily  insulin aspart (NovoLOG) Pump 1Each SubCutaneous Continuous Pump    MEDICATIONS  (PRN):  dextrose Gel 1Dose(s) Oral once PRN Blood Glucose LESS THAN 70 milliGRAM(s)/deciliter  glucagon  Injectable 1milliGRAM(s) IntraMuscular once PRN Glucose LESS THAN 70 milligrams/deciliter  acetaminophen   Tablet. 650milliGRAM(s) Oral every 6 hours PRN Mild Pain (1 - 3)  oxyCODONE  5 mG/acetaminophen 325 mG 1Tablet(s) Oral every 4 hours PRN Moderate Pain (4 - 6)  HYDROmorphone  Injectable 0.5milliGRAM(s) IV Push every 6 hours PRN Severe Pain (7 - 10)      LABS:                        9.4    5.07  )-----------( 259      ( 07 Jun 2017 07:18 )             29.5     06-07    135  |  98  |  26<H>  ----------------------------<  153<H>  4.7   |  21<L>  |  4.81<H>    Ca    8.0<L>      07 Jun 2017 07:18    RADIOLOGY & ADDITIONAL STUDIES:    EXAM:  ART DUPLEX LOWER EXT BILATERAL                          There is then on impeded flow through the vein graft in the distal thigh   to the surgical anastomosis into the popliteal artery.    Impression: Both the synthetic right femoral-popliteal bypass graft and   the femoral popliteal vein graft on the left are patent.  There is an in graft stenosis of the vein graft on the left in the   proximal thigh with velocities measuring 334 cm/s and a second stenosis   in the left mid thigh with velocities measuring 250 cm/s.

## 2017-06-08 NOTE — PROGRESS NOTE ADULT - ASSESSMENT
60 y/o female with LLE pain likely secondary to stenosis of left vein graft  -Discussed with attending Dr. Elizalde regarding possible angiogram/angioplasty, will plan for Monday 6/12/17 will discuss plan with primary team  -Care as per primary team

## 2017-06-08 NOTE — PROGRESS NOTE ADULT - SUBJECTIVE AND OBJECTIVE BOX
Called by RN last night, pt had fingerstick of 82 at 8pm, was going to eat chicken and pasta so she bolused herself. Then after 2 bites of chicken she felt full and couldn't eat anymore. Bedtime fingerstick at 9:30 was 62 (repeat 64), pt had juice and repeat was 68/73, pt had milk and repeat was 114. At 3am fingerstick check was 215. Pt did not bolus herself (because she wouldn't at this time), will repeat in am before breakfast. F/U with endocrine and primary team in am.

## 2017-06-08 NOTE — PROGRESS NOTE ADULT - PROBLEM SELECTOR PLAN 1
Continue use of insulin pump at present settings  -pt due to change insulin pump site today  -For any questions or concerns regarding insulin pump please contact endocrine team at  (24/7)  -Plan discussed with pt and team. Continue use of insulin pump at present settings  -pt changed insulin pump site today  -For any questions or concerns regarding insulin pump please contact endocrine team at  (24/7)  -Plan discussed with pt and team.

## 2017-06-08 NOTE — PROGRESS NOTE ADULT - ASSESSMENT
62 yo F hx DM, CVA, ESRD/HD  LLE bypass  now with LLE pain and swelling CT: Asymmetric enlargement of the left calf with confluent edema in the   superficial soft tissues.   day 4 of empiric Vancomycin for possible early cellulitis  Last Vanco level 16.9, s/p redose 1g IV on 6/6  PLAN:   vascular input noted, LLE pain likely secondary to stenosis of left vein graft  possible angiogram/angioplasty, on Monday 6/12/17 as per vascular  No indications for further abx at present  No active ID issues, re-consult as needed

## 2017-06-08 NOTE — PROGRESS NOTE ADULT - SUBJECTIVE AND OBJECTIVE BOX
Patient is a 61y old  Female who presents with a chief complaint of pain  and swelling in left leg (04 Jun 2017 03:44)      SUBJECTIVE / OVERNIGHT EVENTS: Feels better, improved leg pain.  Review of Systems  chest pain no  palpitations no  sob no  nausea no  headache no    MEDICATIONS  (STANDING):  dextrose 5%. 1000milliLiter(s) IV Continuous <Continuous>  dextrose 50% Injectable 12.5Gram(s) IV Push once  dextrose 50% Injectable 25Gram(s) IV Push once  dextrose 50% Injectable 25Gram(s) IV Push once  heparin  Injectable 5000Unit(s) SubCutaneous every 12 hours  aspirin enteric coated 81milliGRAM(s) Oral daily  lisinopril 40milliGRAM(s) Oral daily  DULoxetine 60milliGRAM(s) Oral daily  atorvastatin 20milliGRAM(s) Oral at bedtime  clopidogrel Tablet 75milliGRAM(s) Oral at bedtime  metoprolol succinate ER 50milliGRAM(s) Oral daily  furosemide    Tablet 40milliGRAM(s) Oral every 48 hours  cinacalcet 60milliGRAM(s) Oral daily  sevelamer hydrochloride 2400milliGRAM(s) Oral three times a day with meals  hydrALAZINE 100milliGRAM(s) Oral every 8 hours  multivitamin 1Tablet(s) Oral daily  insulin aspart (NovoLOG) Pump 1Each SubCutaneous Continuous Pump    MEDICATIONS  (PRN):  dextrose Gel 1Dose(s) Oral once PRN Blood Glucose LESS THAN 70 milliGRAM(s)/deciliter  glucagon  Injectable 1milliGRAM(s) IntraMuscular once PRN Glucose LESS THAN 70 milligrams/deciliter  acetaminophen   Tablet. 650milliGRAM(s) Oral every 6 hours PRN Mild Pain (1 - 3)  oxyCODONE  5 mG/acetaminophen 325 mG 1Tablet(s) Oral every 4 hours PRN Moderate Pain (4 - 6)  HYDROmorphone  Injectable 0.5milliGRAM(s) IV Push every 6 hours PRN Severe Pain (7 - 10)      Vital Signs Last 24 Hrs  T(C): 36.6, Max: 36.7 (06-07 @ 16:24)  HR: 74 (64 - 76)  BP: 159/68 (159/68 - 190/88)  RR: 18 (18 - 18)  SpO2: 98% (95% - 99%)  Wt(kg): --  CAPILLARY BLOOD GLUCOSE  232 (08 Jun 2017 07:13)  215 (08 Jun 2017 02:20)  114 (07 Jun 2017 22:47)  68 (07 Jun 2017 22:20)  73 (07 Jun 2017 21:55)  68 (07 Jun 2017 21:39)  64 (07 Jun 2017 21:35)  62 (07 Jun 2017 21:34)  82 (07 Jun 2017 19:55)  234 (07 Jun 2017 16:22)    I&O's Summary    I & Os for current day (as of 08 Jun 2017 12:48)  =============================================  IN: 120 ml / OUT: 0 ml / NET: 120 ml      PHYSICAL EXAM:  GENERAL: NAD, well-developed  HEAD:  Atraumatic, Normocephalic  EYES: EOMI, PERRLA, conjunctiva and sclera clear  NECK: Supple, No JVD  CHEST/LUNG: Clear to auscultation bilaterally; No wheeze  HEART: Regular rate and rhythm; No murmurs, rubs, or gallops  ABDOMEN: Soft, Nontender, Nondistended; Bowel sounds present  EXTREMITIES:  2+ Peripheral Pulses, improved erythema.  PSYCH: AAOx3  NEUROLOGY: non-focal  SKIN: No rashes or lesions    LABS:                        9.4    5.07  )-----------( 259      ( 07 Jun 2017 07:18 )             29.5     06-07    135  |  98  |  26<H>  ----------------------------<  153<H>  4.7   |  21<L>  |  4.81<H>    Ca    8.0<L>      07 Jun 2017 07:18                RADIOLOGY & ADDITIONAL TESTS:    Imaging Personally Reviewed:    Consultant(s) Notes Reviewed:      Care Discussed with Consultants/Other Providers:

## 2017-06-08 NOTE — PROGRESS NOTE ADULT - ASSESSMENT
60 y/o F with T1DM with multiple complications and comorbidities. Pt on Metronic insulin pump. Here with LLE severe pain and edema. S/p doppler this pm. Hypoglycemic last night after bolusing insulin but not eating enough. BG stable today. BG goal for this pt is to prevent hypoglycemia and persistent hyperglycemia.   60 y/o F with T1DM with multiple complications and comorbidities. Pt on Metronic insulin pump. Here with LLE severe pain and edema. 60 y/o F with T1DM with multiple complications and comorbidities. Pt on Metronic insulin pump. Here with LLE severe pain and edema. S/p doppler this pm. Hypoglycemic last night due to not eating enough. BG stable today. BG goal for this pt is to prevent hypoglycemia and persistent hyperglycemia.   60 y/o F with T1DM with multiple complications and comorbidities. Pt on Metronic insulin pump. Here with LLE severe pain and edema.

## 2017-06-08 NOTE — PROVIDER CONTACT NOTE (OTHER) - ACTION/TREATMENT ORDERED:
NP Kaiia aware and ask if pt. does the fingers stick at this time.  Pt, state "no". NP Allyn instruct to fallow the patient routine at home. Will continue to monitor

## 2017-06-08 NOTE — PROGRESS NOTE ADULT - SUBJECTIVE AND OBJECTIVE BOX
Chief Complaint: Uncontrolled Type 2 DM Management  62 y/o F with T1DM with multiple complications and comorbidities. Well known to DM team with frequent admissions  Pt on Metronic insulin pump. Here with LLE severe pain and edema.   Glycemic control variable with hypoglycemia yesterday evening due to insulin bolus and decreased po intake. So far today glucose values controlled.     MEDICATIONS  (STANDING):  insulin aspart (NovoLOG) Pump 1Each SubCutaneous   Continuous Pump Metronic insulin pump settings:  Basal: 12MN >0.4             3:30AM >0.5            6AM>0.425  I:C Ratios: 12MN >9                   11AM>10                   3PM>9  ISF: 12MN 60          7AM 40          6PM 60  TBG 12MN 120          8AM 110  dextrose 5%. 1000milliLiter(s) IV Continuous <Continuous>  dextrose 50% Injectable 12.5Gram(s) IV Push once  dextrose 50% Injectable 25Gram(s) IV Push once  dextrose 50% Injectable 25Gram(s) IV Push once  heparin  Injectable 5000Unit(s) SubCutaneous every 12 hours  aspirin enteric coated 81milliGRAM(s) Oral daily  lisinopril 40milliGRAM(s) Oral daily  DULoxetine 60milliGRAM(s) Oral daily  atorvastatin 20milliGRAM(s) Oral at bedtime  clopidogrel Tablet 75milliGRAM(s) Oral at bedtime  metoprolol succinate ER 50milliGRAM(s) Oral daily  furosemide    Tablet 40milliGRAM(s) Oral every 48 hours  cinacalcet 60milliGRAM(s) Oral daily  sevelamer hydrochloride 2400milliGRAM(s) Oral three times a day with meals  hydrALAZINE 100milliGRAM(s) Oral every 8 hours  multivitamin 1Tablet(s) Oral daily  insulin aspart (NovoLOG) Pump 1Each SubCutaneous Continuous Pump    MEDICATIONS  (PRN):  dextrose Gel 1Dose(s) Oral once PRN Blood Glucose LESS THAN 70 milliGRAM(s)/deciliter  glucagon  Injectable 1milliGRAM(s) IntraMuscular once PRN Glucose LESS THAN 70 milligrams/deciliter  acetaminophen   Tablet. 650milliGRAM(s) Oral every 6 hours PRN Mild Pain (1 - 3)  oxyCODONE  5 mG/acetaminophen 325 mG 1Tablet(s) Oral every 4 hours PRN Moderate Pain (4 - 6)  HYDROmorphone  Injectable 0.5milliGRAM(s) IV Push every 6 hours PRN Severe Pain (7 - 10)      Allergies    No Known Allergies    Intolerances      Review of Systems:  Constitutional: No fever  Eyes: No blurry vision  Cardiovascular: No chest pain  Respiratory: No SOB  GI: No abdominal pain, No nausea, No vomiting  Endocrine: as noted in HPI    All other negative      PHYSICAL EXAM:  VITALS: T(C): 36.7  T(F): 98, Max: 98 (06-07 @ 16:24)  HR: 68 (64 - 76)  BP: 161/72 (159/68 - 190/88)  RR:  (18 - 18)  SpO2:  (95% - 100%)  Wt(kg): --  GENERAL: NAD at this time  EYES: EOMI, No proptosis  HEENT:  Atraumatic, Normocephalic,   RESPIRATORY: Clear to auscultation bilaterally, full excursion, non labored  CARDIOVASCULAR: Regular rhythm; No murmurs; no peripheral edema  GI: Soft, nontender, non distended, normal bowel sounds  SKIN: Dry, intact, No rashes or lesions  PSYCH: Alert and oriented x 3, normal affect, normal mood  CUSHING'S SIGNS: no striae      CAPILLARY BLOOD GLUCOSE  116 (06-08 @ 13:23)  232 (06-08 @ 07:13)  215 (06-08 @ 02:20)  114 (06-07 @ 22:47)  68 (06-07 @ 22:20)  73 (06-07 @ 21:55)  68 (06-07 @ 21:39)  64 (06-07 @ 21:35)  62 (06-07 @ 21:34)  82 (06-07 @ 19:55)  234 (06-07 @ 16:22)  180 (06-07 @ 08:52)  131 (06-07 @ 03:05)  133 (06-06 @ 22:10)  72 (06-06 @ 21:55)  67 (06-06 @ 21:30)  67 (06-06 @ 20:55)  215 (06-06 @ 17:26)  170 (06-06 @ 13:09)  127 (06-06 @ 07:34)  123 (06-06 @ 03:17)  69 (06-06 @ 03:04)  53 (06-06 @ 03:03)  93 (06-05 @ 21:18)  187 (06-05 @ 17:25)        06-07    135  |  98  |  26<H>  ----------------------------<  153<H>  4.7   |  21<L>  |  4.81<H>    EGFR if : 11<L>  EGFR if non : 9<L>    Ca    8.0<L>      06-07          Thyroid Function Tests:      Hemoglobin A1C, Whole Blood: 8.5 % <H> [4.0 - 5.6] (04-11 @ 07:12) Chief Complaint: Uncontrolled Type 2 DM Management  60 y/o F with T1DM with multiple complications and comorbidities. Well known to DM team with frequent admissions  Pt on Metronic insulin pump. Here with LLE severe pain and edema.   Glycemic control variable with hypoglycemia yesterday evening due to insulin bolus at lunch and did not feel like eating dinner with decreased po intake the rest of the evening. So far today glucose values controlled. Changed site today now on left.     MEDICATIONS  (STANDING):  insulin aspart (NovoLOG) Pump 1Each SubCutaneous   Continuous Pump Metronic insulin pump settings:  Basal: 12MN >0.4             3:30AM >0.5            6AM>0.425  I:C Ratios: 12MN >9                   11AM>10                   3PM>9  ISF: 12MN 60          7AM 40          6PM 60  TBG 12MN 120          8AM 110  dextrose 5%. 1000milliLiter(s) IV Continuous <Continuous>  dextrose 50% Injectable 12.5Gram(s) IV Push once  dextrose 50% Injectable 25Gram(s) IV Push once  dextrose 50% Injectable 25Gram(s) IV Push once  heparin  Injectable 5000Unit(s) SubCutaneous every 12 hours  aspirin enteric coated 81milliGRAM(s) Oral daily  lisinopril 40milliGRAM(s) Oral daily  DULoxetine 60milliGRAM(s) Oral daily  atorvastatin 20milliGRAM(s) Oral at bedtime  clopidogrel Tablet 75milliGRAM(s) Oral at bedtime  metoprolol succinate ER 50milliGRAM(s) Oral daily  furosemide    Tablet 40milliGRAM(s) Oral every 48 hours  cinacalcet 60milliGRAM(s) Oral daily  sevelamer hydrochloride 2400milliGRAM(s) Oral three times a day with meals  hydrALAZINE 100milliGRAM(s) Oral every 8 hours  multivitamin 1Tablet(s) Oral daily  insulin aspart (NovoLOG) Pump 1Each SubCutaneous Continuous Pump    MEDICATIONS  (PRN):  dextrose Gel 1Dose(s) Oral once PRN Blood Glucose LESS THAN 70 milliGRAM(s)/deciliter  glucagon  Injectable 1milliGRAM(s) IntraMuscular once PRN Glucose LESS THAN 70 milligrams/deciliter  acetaminophen   Tablet. 650milliGRAM(s) Oral every 6 hours PRN Mild Pain (1 - 3)  oxyCODONE  5 mG/acetaminophen 325 mG 1Tablet(s) Oral every 4 hours PRN Moderate Pain (4 - 6)  HYDROmorphone  Injectable 0.5milliGRAM(s) IV Push every 6 hours PRN Severe Pain (7 - 10)      Allergies    No Known Allergies    Intolerances      Review of Systems:  Constitutional: No fever, +fatigue  Eyes: No blurry vision  Cardiovascular: No chest pain  Respiratory: No SOB  GI: No abdominal pain, No nausea, No vomiting  Endocrine: as noted in HPI    All other negative      PHYSICAL EXAM:  VITALS: T(C): 36.7  T(F): 98, Max: 98 (06-07 @ 16:24)  HR: 68 (64 - 76)  BP: 161/72 (159/68 - 190/88)  RR:  (18 - 18)  SpO2:  (95% - 100%)  Wt(kg): --  GENERAL: NAD at this time  EYES: EOMI, No proptosis  HEENT:  Atraumatic, Normocephalic,   RESPIRATORY: Clear to auscultation bilaterally, full excursion, non labored  CARDIOVASCULAR: Regular rhythm; No murmurs; no peripheral edema  GI: Soft, nontender, non distended, normal bowel sounds pump on left side of abdomen  SKIN: Dry, intact, No rashes or lesions  PSYCH: normal affect, normal mood  CUSHING'S SIGNS: no striae      CAPILLARY BLOOD GLUCOSE  116 (06-08 @ 13:23)  232 (06-08 @ 07:13)  215 (06-08 @ 02:20)  114 (06-07 @ 22:47)  68 (06-07 @ 22:20)  73 (06-07 @ 21:55)  68 (06-07 @ 21:39)  64 (06-07 @ 21:35)  62 (06-07 @ 21:34)  82 (06-07 @ 19:55)  234 (06-07 @ 16:22)  180 (06-07 @ 08:52)  131 (06-07 @ 03:05)  133 (06-06 @ 22:10)  72 (06-06 @ 21:55)  67 (06-06 @ 21:30)  67 (06-06 @ 20:55)  215 (06-06 @ 17:26)  170 (06-06 @ 13:09)  127 (06-06 @ 07:34)  123 (06-06 @ 03:17)  69 (06-06 @ 03:04)  53 (06-06 @ 03:03)  93 (06-05 @ 21:18)  187 (06-05 @ 17:25)        06-07    135  |  98  |  26<H>  ----------------------------<  153<H>  4.7   |  21<L>  |  4.81<H>    EGFR if : 11<L>  EGFR if non : 9<L>    Ca    8.0<L>      06-07          Thyroid Function Tests:      Hemoglobin A1C, Whole Blood: 8.5 % <H> [4.0 - 5.6] (04-11 @ 07:12)

## 2017-06-08 NOTE — PROGRESS NOTE ADULT - SUBJECTIVE AND OBJECTIVE BOX
CC: f/u for LLE pain and swelling    Patient reports no new c/o, leg pain better controlled    REVIEW OF SYSTEMS: no fevers, no chills, no nausea, no vomiting, no abd pain, no resp symptoms  All other review of systems negative (Comprehensive ROS)    Antimicrobials: s/p vanco redose on 6/6    Other Medications Reviewed    T(F): 98, Max: 98.1 (06-06 @ 20:55)  HR: 83  BP: 165/74  RR: 18  SpO2: 98%  Wt(kg): --    PHYSICAL EXAM:  General: alert, no acute distress  Eyes:  anicteric, no conjunctival injection, no discharge  Oropharynx: no lesions or injection 	  Neck: supple, without adenopathy  Lungs: clear to auscultation  Heart: regular rate and rhythm; no murmur, rubs or gallops  Abdomen: soft, nondistended, nontender, without mass or organomegaly  Skin: no lesions  Extremities: no clubbing, cyanosis, decreased edema LLE  Neurologic: alert, oriented, moves all extremities    LAB RESULTS:                        9.4    5.07  )-----------( 259      ( 07 Jun 2017 07:18 )             29.5     06-07    135  |  98  |  26<H>  ----------------------------<  153<H>  4.7   |  21<L>  |  4.81<H>    Ca    8.0<L>      07 Jun 2017 07:18            MICROBIOLOGY REVIEWED:    RADIOLOGY REVIEWED:

## 2017-06-08 NOTE — PROGRESS NOTE ADULT - SUBJECTIVE AND OBJECTIVE BOX
[facility]   [phone]  DIALYSIS NOTE  Chief Complaint: ESRD/Ongoing hemodialysis requirement. seen on hd    24 hour events/subjective:            ALLERGIES & MEDICATIONS  --------------------------------------------------------------------------------  Allergies    No Known Allergies    Intolerances      Standing Inpatient Medications  dextrose 5%. 1000milliLiter(s) IV Continuous <Continuous>  dextrose 50% Injectable 12.5Gram(s) IV Push once  dextrose 50% Injectable 25Gram(s) IV Push once  dextrose 50% Injectable 25Gram(s) IV Push once  heparin  Injectable 5000Unit(s) SubCutaneous every 12 hours  aspirin enteric coated 81milliGRAM(s) Oral daily  lisinopril 40milliGRAM(s) Oral daily  DULoxetine 60milliGRAM(s) Oral daily  atorvastatin 20milliGRAM(s) Oral at bedtime  clopidogrel Tablet 75milliGRAM(s) Oral at bedtime  metoprolol succinate ER 50milliGRAM(s) Oral daily  furosemide    Tablet 40milliGRAM(s) Oral every 48 hours  cinacalcet 60milliGRAM(s) Oral daily  sevelamer hydrochloride 2400milliGRAM(s) Oral three times a day with meals  hydrALAZINE 100milliGRAM(s) Oral every 8 hours  multivitamin 1Tablet(s) Oral daily  insulin aspart (NovoLOG) Pump 1Each SubCutaneous Continuous Pump    PRN Inpatient Medications  dextrose Gel 1Dose(s) Oral once PRN  glucagon  Injectable 1milliGRAM(s) IntraMuscular once PRN  acetaminophen   Tablet. 650milliGRAM(s) Oral every 6 hours PRN  oxyCODONE  5 mG/acetaminophen 325 mG 1Tablet(s) Oral every 4 hours PRN  HYDROmorphone  Injectable 0.5milliGRAM(s) IV Push every 6 hours PRN    .    VITALS/PHYSICAL EXAM  --------------------------------------------------------------------------------  T(C): 36.6, Max: 36.7 (06-07 @ 16:24)  HR: 74 (64 - 76)  BP: 159/68 (159/68 - 190/88)  RR: 18 (18 - 18)  SpO2: 98% (95% - 99%)  Wt(kg): --  Height (cm): 162.6 (06-08-17 @ 08:00)  Weight (kg): 67 (06-08-17 @ 08:30)  BMI (kg/m2): 25.3 (06-08-17 @ 08:30)  BSA (m2): 1.72 (06-08-17 @ 08:30)      I & Os for current day (as of 06-08-17 @ 09:58)  =============================================  IN: 120 ml / OUT: 0 ml / NET: 120 ml    Physical Exam:  	Gen: NAD, well-appearing  	  	Pulm: CTA right left crackles bases  	CV: RRR, S1S2; no rub  	Abd: +BS, soft, nontender/nondistended  	LE: Warm, FROM, no clubbing, intact strength; no edema right. left 2+ edema but erythema improved  	Skin: Warm,   	Vascular access:    LABS/STUDIES  --------------------------------------------------------------------------------              9.4    5.07  >-----------<  259      [06-07-17 @ 07:18]              29.5     135  |  98  |  26  ----------------------------<  153      [06-07-17 @ 07:18]  4.7   |  21  |  4.81        Ca     8.0     [06-07-17 @ 07:18]            imp/suggest:      Hemodialysis Prescription:  	Access:avf  	Dialyzer:ex 170  	Blood Flow (mL/Min): 400  	Dialysate Flow (mL/Min): 600  	Target UF (Liters): 2.5 liter  	Treatment Time:3.5  	Potassium:2k  	Calcium: 2.5  	  YOLANDA with dialysis epo 00656 units ivp  Vitamin D analogue once pth available check pth     continue with hd   see hd flow sheet

## 2017-06-09 DIAGNOSIS — I10 ESSENTIAL (PRIMARY) HYPERTENSION: ICD-10-CM

## 2017-06-09 DIAGNOSIS — N25.81 SECONDARY HYPERPARATHYROIDISM OF RENAL ORIGIN: ICD-10-CM

## 2017-06-09 DIAGNOSIS — E10.51 TYPE 1 DIABETES MELLITUS WITH DIABETIC PERIPHERAL ANGIOPATHY WITHOUT GANGRENE: ICD-10-CM

## 2017-06-09 LAB
ANION GAP SERPL CALC-SCNC: 19 MMOL/L — HIGH (ref 5–17)
APTT BLD: 31.4 SEC — SIGNIFICANT CHANGE UP (ref 27.5–37.4)
BLD GP AB SCN SERPL QL: NEGATIVE — SIGNIFICANT CHANGE UP
BUN SERPL-MCNC: 23 MG/DL — SIGNIFICANT CHANGE UP (ref 7–23)
CALCIUM SERPL-MCNC: 8 MG/DL — LOW (ref 8.4–10.5)
CHLORIDE SERPL-SCNC: 96 MMOL/L — SIGNIFICANT CHANGE UP (ref 96–108)
CO2 SERPL-SCNC: 23 MMOL/L — SIGNIFICANT CHANGE UP (ref 22–31)
CREAT SERPL-MCNC: 4.25 MG/DL — HIGH (ref 0.5–1.3)
GLUCOSE SERPL-MCNC: 96 MG/DL — SIGNIFICANT CHANGE UP (ref 70–99)
HCT VFR BLD CALC: 29.2 % — LOW (ref 34.5–45)
HGB BLD-MCNC: 9.1 G/DL — LOW (ref 11.5–15.5)
INR BLD: 0.94 RATIO — SIGNIFICANT CHANGE UP (ref 0.88–1.16)
MCHC RBC-ENTMCNC: 31.2 GM/DL — LOW (ref 32–36)
MCHC RBC-ENTMCNC: 31.6 PG — SIGNIFICANT CHANGE UP (ref 27–34)
MCV RBC AUTO: 101.4 FL — HIGH (ref 80–100)
PLATELET # BLD AUTO: 237 K/UL — SIGNIFICANT CHANGE UP (ref 150–400)
POTASSIUM SERPL-MCNC: 4.7 MMOL/L — SIGNIFICANT CHANGE UP (ref 3.5–5.3)
POTASSIUM SERPL-SCNC: 4.7 MMOL/L — SIGNIFICANT CHANGE UP (ref 3.5–5.3)
PROTHROM AB SERPL-ACNC: 10.6 SEC — SIGNIFICANT CHANGE UP (ref 10–13.1)
RBC # BLD: 2.88 M/UL — LOW (ref 3.8–5.2)
RBC # FLD: 14.2 % — SIGNIFICANT CHANGE UP (ref 10.3–14.5)
RH IG SCN BLD-IMP: POSITIVE — SIGNIFICANT CHANGE UP
SODIUM SERPL-SCNC: 138 MMOL/L — SIGNIFICANT CHANGE UP (ref 135–145)
WBC # BLD: 3.94 K/UL — SIGNIFICANT CHANGE UP (ref 3.8–10.5)
WBC # FLD AUTO: 3.94 K/UL — SIGNIFICANT CHANGE UP (ref 3.8–10.5)

## 2017-06-09 PROCEDURE — 99233 SBSQ HOSP IP/OBS HIGH 50: CPT

## 2017-06-09 RX ORDER — INSULIN ASPART 100 [IU]/ML
1 INJECTION, SOLUTION SUBCUTANEOUS
Qty: 0 | Refills: 0 | Status: DISCONTINUED | OUTPATIENT
Start: 2017-06-09 | End: 2017-06-10

## 2017-06-09 RX ADMIN — CLOPIDOGREL BISULFATE 75 MILLIGRAM(S): 75 TABLET, FILM COATED ORAL at 15:49

## 2017-06-09 RX ADMIN — DULOXETINE HYDROCHLORIDE 60 MILLIGRAM(S): 30 CAPSULE, DELAYED RELEASE ORAL at 12:23

## 2017-06-09 RX ADMIN — Medication 81 MILLIGRAM(S): at 12:23

## 2017-06-09 RX ADMIN — SEVELAMER CARBONATE 2400 MILLIGRAM(S): 2400 POWDER, FOR SUSPENSION ORAL at 18:25

## 2017-06-09 RX ADMIN — LISINOPRIL 40 MILLIGRAM(S): 2.5 TABLET ORAL at 05:32

## 2017-06-09 RX ADMIN — Medication 100 MILLIGRAM(S): at 22:54

## 2017-06-09 RX ADMIN — Medication 1 TABLET(S): at 12:23

## 2017-06-09 RX ADMIN — ATORVASTATIN CALCIUM 20 MILLIGRAM(S): 80 TABLET, FILM COATED ORAL at 22:54

## 2017-06-09 RX ADMIN — CINACALCET 60 MILLIGRAM(S): 30 TABLET, FILM COATED ORAL at 12:23

## 2017-06-09 RX ADMIN — Medication 100 MILLIGRAM(S): at 16:05

## 2017-06-09 RX ADMIN — Medication 100 MILLIGRAM(S): at 05:32

## 2017-06-09 RX ADMIN — Medication 50 MILLIGRAM(S): at 05:32

## 2017-06-09 RX ADMIN — CLOPIDOGREL BISULFATE 75 MILLIGRAM(S): 75 TABLET, FILM COATED ORAL at 22:54

## 2017-06-09 NOTE — DIETITIAN INITIAL EVALUATION ADULT. - NS AS NUTRI INTERV ED CONTENT
Reviewed alternate menu options and menu ordering procedure consistent carbohydrate and renal diet guidelines to assess awareness and understanding./Recommended modifications

## 2017-06-09 NOTE — PROGRESS NOTE ADULT - ASSESSMENT
60 y/o F with T1DM with multiple complications and comorbidities. Pt on Metronic insulin pump. Here with LLE severe pain and edema. S/p doppler and today s/p angio with LLE ballooning per pt/. BGs <100s at night. BG goal for this pt is to prevent hypoglycemia and persistent hyperglycemia. Stable wbc.

## 2017-06-09 NOTE — PROGRESS NOTE ADULT - PROBLEM SELECTOR PROBLEM 1
Uncontrolled type 1 diabetes mellitus without complication Type 1 diabetes mellitus with diabetic peripheral angiopathy without gangrene

## 2017-06-09 NOTE — PROGRESS NOTE ADULT - SUBJECTIVE AND OBJECTIVE BOX
[facility]   [phone]  RENAL FOLLOW UP NOTE  --------------------------------------------------------------------------------  Chief Complaint: esrd    24 hour events/subjective:  states pain in leg is better and edema is better.       PAST HISTORY  --------------------------------------------------------------------------------  No significant changes to PMH, PSH, FHx, SHx, unless otherwise noted    ALLERGIES & MEDICATIONS  --------------------------------------------------------------------------------  Allergies    No Known Allergies    Intolerances      Standing Inpatient Medications  dextrose 5%. 1000milliLiter(s) IV Continuous <Continuous>  dextrose 50% Injectable 12.5Gram(s) IV Push once  dextrose 50% Injectable 25Gram(s) IV Push once  dextrose 50% Injectable 25Gram(s) IV Push once  heparin  Injectable 5000Unit(s) SubCutaneous every 12 hours  aspirin enteric coated 81milliGRAM(s) Oral daily  lisinopril 40milliGRAM(s) Oral daily  DULoxetine 60milliGRAM(s) Oral daily  atorvastatin 20milliGRAM(s) Oral at bedtime  clopidogrel Tablet 75milliGRAM(s) Oral at bedtime  metoprolol succinate ER 50milliGRAM(s) Oral daily  furosemide    Tablet 40milliGRAM(s) Oral every 48 hours  cinacalcet 60milliGRAM(s) Oral daily  sevelamer hydrochloride 2400milliGRAM(s) Oral three times a day with meals  hydrALAZINE 100milliGRAM(s) Oral every 8 hours  multivitamin 1Tablet(s) Oral daily  insulin aspart (NovoLOG) Pump 1Each SubCutaneous Continuous Pump    PRN Inpatient Medications  dextrose Gel 1Dose(s) Oral once PRN  glucagon  Injectable 1milliGRAM(s) IntraMuscular once PRN  acetaminophen   Tablet. 650milliGRAM(s) Oral every 6 hours PRN  oxyCODONE  5 mG/acetaminophen 325 mG 1Tablet(s) Oral every 4 hours PRN  HYDROmorphone  Injectable 0.5milliGRAM(s) IV Push every 6 hours PRN      REVIEW OF SYSTEMS  --------------------------------------------------------------------------------  no c/o cp or palp sob or cough no N/V/D. no abd pain no fever or chills.   edema and redness in left leg better.   All other systems were reviewed and are negative, except as noted.    VITALS/PHYSICAL EXAM  --------------------------------------------------------------------------------  T(C): 36.8, Max: 36.9 (06-08 @ 21:00)  HR: 73 (68 - 80)  BP: 169/71 (143/73 - 169/71)  RR: 18 (18 - 18)  SpO2: 97% (97% - 100%)  Wt(kg): --  Height (cm): 162.6 (06-08-17 @ 08:00)  Weight (kg): 67 (06-08-17 @ 08:30)  BMI (kg/m2): 25.3 (06-08-17 @ 08:30)  BSA (m2): 1.72 (06-08-17 @ 08:30)      Physical Exam:  Gen: alert and oriented.   no jvd neck supple  heart S1/S2 RRR no rub  lungs cta no rales or ronchi  abd + bs soft non tender  ext LLE edema improved and erythema dissipated  avf + bruit and thrill    LABS/STUDIES  --------------------------------------------------------------------------------              9.1    3.94  >-----------<  237      [06-09-17 @ 07:27]              29.2     138  |  96  |  23  ----------------------------<  96      [06-09-17 @ 07:30]  4.7   |  23  |  4.25        Ca     8.0     [06-09-17 @ 07:30]      PT/INR: PT 10.6 , INR 0.94       [06-09-17 @ 07:27]  PTT: 31.4       [06-09-17 @ 07:27]      Creatinine Trend:  SCr 4.25 [06-09 @ 07:30]  SCr 4.81 [06-07 @ 07:18]  SCr 8.18 [06-06 @ 13:56]  SCr 6.15 [06-05 @ 08:36]  SCr 3.99 [06-04 @ 08:24]              Urinalysis - [06-04-17 @ 08:24]      Color Yellow / Appearance Clear / SG 1.013 / pH 8.5      Gluc 1000 / Ketone Negative  / Bili Negative / Urobili Negative       Blood Negative / Protein >600 / Leuk Est Small / Nitrite Negative      RBC 3-5 / WBC 6-10 / Hyaline  / Gran  / Sq Epi  / Non Sq Epi OCC / Bacteria       PTH -- (Ca 8.7)      [06-19-16 @ 09:29]   773  Vitamin D (25OH) 40.7      [06-19-16 @ 09:29]  HbA1c 8.5      [04-11-17 @ 07:12]

## 2017-06-09 NOTE — DIETITIAN INITIAL EVALUATION ADULT. - PERTINENT LABORATORY DATA
6/09/17:  Hgb/Hct - 9.1/29.2, K+ - 4.7, glucose - 96, Ca - 8.0. (last phos level - 4.2 on 5/11/17)   F/S: 6/09/17: ;  6/08/17: 100-232;  6/07/17: ;  6/06/17: .  (last HbA1c - 8.5 on 4/11/17)

## 2017-06-09 NOTE — DIETITIAN INITIAL EVALUATION ADULT. - DIET TYPE
renal replacement pts:no protein restr,no conc K & phos, low sodium/consistent carbohydrate (no snacks)

## 2017-06-09 NOTE — DIETITIAN INITIAL EVALUATION ADULT. - NS AS NUTRI INTERV MEALS SNACK
Composition of meals/snacks/1)  Obtain and honor meal preferences   2) Encourage adequate po intakes at meals

## 2017-06-09 NOTE — PROVIDER CONTACT NOTE (OTHER) - BACKGROUND
Pt. hx CVA, Diabetes mellitus type 1, CAD HTN
Diabetes Mellitus type 1, CAD, DKA, HTN
hx of DM type 1 with insulin pump , NPO going for procedure today.
pt admitted with cellulitis of LLE. hx DM insulin pump in place

## 2017-06-09 NOTE — DIETITIAN INITIAL EVALUATION ADULT. - SOURCE
comprehensive chart/medical record review/patient patient/family/significant other/patient's spouse at bedside; comprehensive chart/medical record review, including previous RD assessments.  Patient had 4 previous admissions in 2017.

## 2017-06-09 NOTE — PROGRESS NOTE ADULT - ASSESSMENT
1- ESRD HD am   2- HTN cont with zestril and hydralazine  3- SHPT to have pth checked. to have phos checked. cont with sensipar and renvela  4- PAD. for L PREETHI angioplasty/stent  5- cellulitis LLE improving to complete iv vanco

## 2017-06-09 NOTE — PROGRESS NOTE ADULT - SUBJECTIVE AND OBJECTIVE BOX
Diabetes Follow up note:  Interval Hx: 62 y/o F with T1DM with multiple complications and comorbidities. Well known to DM team with frequent admissions  Pt on Metronic insulin pump. Here with LLE severe pain and edema. Had angio done today with LLE ballooning per pt/  Glycemic control variable with BGs <100s overnight for the last 3 days. Pt dislikes hospital food and bolus before he eats food causing rebound hypoglycemia  on 6/6 and 6/7. Also with BG 80s while NPO after MN last night.     Allergies    No Known Allergies    Intolerances      Review of Systems:  GI: Tolerating POs without any N/V/D/ABD PAIN.  CV: No CP/SOB  ENDO: No S&Sx of hypoglycemia. Pt has hypoglycemia unawareness  EXTREMITIES: Improved pain in LLE    DM MEDS:  insulin aspart (NovoLOG) Pump 1Each SubCutaneous   Continuous Pump Metronic insulin pump settings:  Basal: 12MN >0.4             3:30AM >0.5            6AM>0.425  I:C Ratios: 12MN >9                   11AM>10                   3PM>9  ISF: 12MN 60          7AM 40          6PM 60  TBG 12MN 120          8AM 110    OTHER MEDS:    atorvastatin 20milliGRAM(s) Oral at bedtime      PE:  Vital Signs Last 24 Hrs  T(C): 36.7, Max: 36.9 (06-08 @ 21:00)  T(F): 98, Max: 98.4 (06-08 @ 21:00)  HR: 70 (70 - 73)  BP: 177/67 (143/73 - 177/67)  BP(mean): --  RR: 18 (18 - 18)  SpO2: 95% (95% - 100%)  Abd: Soft, NT, ND, insulin pump insertion in L quadrant D&I. R inguinal dressing D&I  Extremities: Warm. No redness. Minimal LLE edema with some tenderness at touch. No signs of infection noted  Neuro: A&O X3.  at bedside.    LABS:  CAPILLARY BLOOD GLUCOSE  232 (09 Jun 2017 16:03)  224 (09 Jun 2017 13:24)  154 (09 Jun 2017 08:44)  103 (09 Jun 2017 06:00)  98 (09 Jun 2017 03:41)  88 (09 Jun 2017 01:52)  100 (08 Jun 2017 21:00)  113 (08 Jun 2017 17:17)  116 (08 Jun 2017 13:23)  232 (08 Jun 2017 07:13)  215 (08 Jun 2017 02:20)  114 (07 Jun 2017 22:47)  68 (07 Jun 2017 22:20)  73 (07 Jun 2017 21:55)  68 (07 Jun 2017 21:39)  64 (07 Jun 2017 21:35)  62 (07 Jun 2017 21:34)  82 (07 Jun 2017 19:55)  234 (07 Jun 2017 16:22)  180 (07 Jun 2017 08:52)  131 (07 Jun 2017 03:05)                      9.1    3.94  )-----------( 237      ( 09 Jun 2017 07:27 )             29.2     06-09    138  |  96  |  23  ----------------------------<  96  4.7   |  23  |  4.25<H>    Ca    8.0<L>      09 Jun 2017 07:30        Hemoglobin A1C, Whole Blood: 8.5 % <H> [4.0 - 5.6] (04-11 @ 07:12)

## 2017-06-09 NOTE — DIETITIAN INITIAL EVALUATION ADULT. - ADHERENCE
good/Patient carbohydrate counting good/Patient limits carbohydrate servings and portions.  She follows low sodium, low potassium , low phosphorus diet restrictions and limits fluid intakes to 1 liter daily.  Patient aware to eat increased portions of protein at meals.

## 2017-06-09 NOTE — DIETITIAN INITIAL EVALUATION ADULT. - ORAL INTAKE PTA
good good/Patient reports having a good appetite PTA and eating 3 meals daily.  She consumes eggs and toast with low sodium pacheco at breakfast;  tuna fish or low sodium hand sandwich at lunch and chicken with noodles or hamburger at dinner.  She will snack on diet cookies, diet cake or grapes Patient reports having a good appetite PTA and eating 3 meals daily.  She consumes eggs and toast with low sodium pacheco at breakfast;  tuna fish or low sodium ham sandwich at lunch and chicken with noodles or hamburger at dinner.  She will snack on diet cookies, diet cake or grapes/good

## 2017-06-09 NOTE — DIETITIAN INITIAL EVALUATION ADULT. - ENERGY NEEDS
Height: 5' 4"      Weight: 142.1 lbs     BMI: 24.4 kg/m2     IBW: 120 lbs  (+/- 10%)    % IBW: 118 %          Edema: 2+ left leg/ankle      Skin: no impairment Height: 5' 4"      Weight: 142.1 lbs     BMI: 24.4 kg/m2     IBW: 120 lbs  (+/- 10%)    % IBW: 118 %          Edema: 2+ left leg/ankle      Skin: no impairment    Other pertinent information: Height: 5' 4"      Weight: 142.1 lbs     BMI: 24.4 kg/m2     IBW: 120 lbs  (+/- 10%)    % IBW: 118 %          Edema: 2+ left leg/ankle      Skin: no impairment    Other pertinent information: Patient presented with leg pain, admitted with leg pain and swelling LE.  Noted PMH includes T1DM, HTN, HLD, CAD - s/p stents, CVA.  Noted with resolving cellulitis.

## 2017-06-09 NOTE — DIETITIAN INITIAL EVALUATION ADULT. - OTHER INFO
Patient seen by nutrition for length of stay. Patient seen by nutrition for length of stay.  Patient reports her appetite and meal intakes have been good since her admission, consuming % of meals.  Patient has dentures, however, reports not wearing them most of the time.  She is able to manage most foods without difficulty.  Denies having any swallowing difficulty and no nausea/emesis or GI discomfort.  Patient reports her UBW to be 131 lbs and weight gain likely due to fluids.  Patient monitors glucose levels 5 times daily and reports results have been 100 - 200 in recent months.  Patient confirms NKFA.  Patient verbalizes knowledge and understanding of diabetic and renal diet recommendations. Patient seen by nutrition for length of stay.  Patient reports her appetite and meal intakes have been good since her admission, consuming % of meals.  Patient has dentures, however, reports not wearing them most of the time.  She is able to manage chewing most foods without difficulty.  Denies having any swallowing difficulty and no nausea/emesis or GI discomfort.  Patient reports her UBW to be 131 lbs and weight gain likely due to fluids.  Patient monitors glucose levels 5 times daily and reports results have been 100 - 200 in recent months.  Patient confirms NKFA.  Patient verbalizes knowledge and understanding of diabetic and renal diet recommendations.  RD offered oral supplements and patient declined these.

## 2017-06-09 NOTE — PROGRESS NOTE ADULT - PROBLEM SELECTOR PLAN 1
-Continue use of insulin pump adjusting 12mn basal to 0.35 from 0.4 2/2 low BG overnight  -Pt to re start PO. Once starting POs pt instructed to bolus after she is to make sure she is entering correct amount of carbs and prevent hypoglycemia. Pt/ verbalize understanding.  -pt due to change insulin pump site 6/11/17.  -For any questions or concerns regarding insulin pump please contact endocrine team at  (24/7)  -Plan discussed with pt and team. Beeper [phone] -Test BG ac and hs  -Continue insulin pump but decreased basal dose at 12mn to 0.35 from 0.4. All other settings kept the same.  -Pt to bolus after meals to make sure she is entering correct amount of carbs eaten and prevent hypoglycemia. Pt/  verbalized understanding  -Pt to change insulin pump site 6/11/2017  -Plan discussed with pt/ and team.

## 2017-06-09 NOTE — PROGRESS NOTE ADULT - SUBJECTIVE AND OBJECTIVE BOX
Patient is a 61y old  Female who presents with a chief complaint of pain  and swelling in left leg (04 Jun 2017 03:44)      SUBJECTIVE / OVERNIGHT EVENTS: Comfortable, tired, after angio.  Review of Systems  chest pain no  palpitations no  sob no  nausea no  headache no    MEDICATIONS  (STANDING):  dextrose 5%. 1000milliLiter(s) IV Continuous <Continuous>  dextrose 50% Injectable 12.5Gram(s) IV Push once  dextrose 50% Injectable 25Gram(s) IV Push once  dextrose 50% Injectable 25Gram(s) IV Push once  heparin  Injectable 5000Unit(s) SubCutaneous every 12 hours  aspirin enteric coated 81milliGRAM(s) Oral daily  lisinopril 40milliGRAM(s) Oral daily  DULoxetine 60milliGRAM(s) Oral daily  atorvastatin 20milliGRAM(s) Oral at bedtime  clopidogrel Tablet 75milliGRAM(s) Oral at bedtime  metoprolol succinate ER 50milliGRAM(s) Oral daily  furosemide    Tablet 40milliGRAM(s) Oral every 48 hours  cinacalcet 60milliGRAM(s) Oral daily  sevelamer hydrochloride 2400milliGRAM(s) Oral three times a day with meals  hydrALAZINE 100milliGRAM(s) Oral every 8 hours  multivitamin 1Tablet(s) Oral daily  insulin aspart (NovoLOG) Pump 1Each SubCutaneous Continuous Pump    MEDICATIONS  (PRN):  dextrose Gel 1Dose(s) Oral once PRN Blood Glucose LESS THAN 70 milliGRAM(s)/deciliter  glucagon  Injectable 1milliGRAM(s) IntraMuscular once PRN Glucose LESS THAN 70 milligrams/deciliter  acetaminophen   Tablet. 650milliGRAM(s) Oral every 6 hours PRN Mild Pain (1 - 3)  oxyCODONE  5 mG/acetaminophen 325 mG 1Tablet(s) Oral every 4 hours PRN Moderate Pain (4 - 6)  HYDROmorphone  Injectable 0.5milliGRAM(s) IV Push every 6 hours PRN Severe Pain (7 - 10)      Vital Signs Last 24 Hrs  T(C): 36.7, Max: 36.9 (06-08 @ 21:00)  HR: 70 (70 - 73)  BP: 177/67 (143/73 - 177/67)  RR: 18 (18 - 18)  SpO2: 95% (95% - 100%)  Wt(kg): --  CAPILLARY BLOOD GLUCOSE  232 (09 Jun 2017 16:03)  224 (09 Jun 2017 13:24)  154 (09 Jun 2017 08:44)  103 (09 Jun 2017 06:00)  98 (09 Jun 2017 03:41)  88 (09 Jun 2017 01:52)  100 (08 Jun 2017 21:00)  113 (08 Jun 2017 17:17)    I&O's Summary      PHYSICAL EXAM:  GENERAL: NAD, well-developed  HEAD:  Atraumatic, Normocephalic  EYES: EOMI, PERRLA, conjunctiva and sclera clear  NECK: Supple, No JVD  CHEST/LUNG: Clear to auscultation bilaterally; No wheeze  HEART: Regular rate and rhythm; No murmurs, rubs, or gallops  ABDOMEN: Soft, Nontender, Nondistended; Bowel sounds present  EXTREMITIES:  2+ Peripheral Pulses, No clubbing, cyanosis, or edema Left leg decreased edema, nontender.  PSYCH: AAOx3  NEUROLOGY: non-focal  SKIN: No rashes or lesions    LABS:                        9.1    3.94  )-----------( 237      ( 09 Jun 2017 07:27 )             29.2     06-09    138  |  96  |  23  ----------------------------<  96  4.7   |  23  |  4.25<H>    Ca    8.0<L>      09 Jun 2017 07:30      PT/INR - ( 09 Jun 2017 07:27 )   PT: 10.6 sec;   INR: 0.94 ratio         PTT - ( 09 Jun 2017 07:27 )  PTT:31.4 sec          RADIOLOGY & ADDITIONAL TESTS:    Imaging Personally Reviewed:    Consultant(s) Notes Reviewed:      Care Discussed with Consultants/Other Providers:

## 2017-06-10 ENCOUNTER — TRANSCRIPTION ENCOUNTER (OUTPATIENT)
Age: 62
End: 2017-06-10

## 2017-06-10 VITALS — WEIGHT: 142.2 LBS

## 2017-06-10 LAB
ANION GAP SERPL CALC-SCNC: 17 MMOL/L — SIGNIFICANT CHANGE UP (ref 5–17)
BUN SERPL-MCNC: 35 MG/DL — HIGH (ref 7–23)
CALCIUM SERPL-MCNC: 8.5 MG/DL — SIGNIFICANT CHANGE UP (ref 8.4–10.5)
CHLORIDE SERPL-SCNC: 96 MMOL/L — SIGNIFICANT CHANGE UP (ref 96–108)
CO2 SERPL-SCNC: 21 MMOL/L — LOW (ref 22–31)
CREAT SERPL-MCNC: 6.6 MG/DL — HIGH (ref 0.5–1.3)
CULTURE RESULTS: SIGNIFICANT CHANGE UP
CULTURE RESULTS: SIGNIFICANT CHANGE UP
GLUCOSE SERPL-MCNC: 106 MG/DL — HIGH (ref 70–99)
HCT VFR BLD CALC: 25.8 % — LOW (ref 34.5–45)
HGB BLD-MCNC: 8.2 G/DL — LOW (ref 11.5–15.5)
MCHC RBC-ENTMCNC: 31.4 PG — SIGNIFICANT CHANGE UP (ref 27–34)
MCHC RBC-ENTMCNC: 31.8 GM/DL — LOW (ref 32–36)
MCV RBC AUTO: 98.9 FL — SIGNIFICANT CHANGE UP (ref 80–100)
PLATELET # BLD AUTO: 215 K/UL — SIGNIFICANT CHANGE UP (ref 150–400)
POTASSIUM SERPL-MCNC: 5 MMOL/L — SIGNIFICANT CHANGE UP (ref 3.5–5.3)
POTASSIUM SERPL-SCNC: 5 MMOL/L — SIGNIFICANT CHANGE UP (ref 3.5–5.3)
RBC # BLD: 2.61 M/UL — LOW (ref 3.8–5.2)
RBC # FLD: 14.1 % — SIGNIFICANT CHANGE UP (ref 10.3–14.5)
SODIUM SERPL-SCNC: 134 MMOL/L — LOW (ref 135–145)
SPECIMEN SOURCE: SIGNIFICANT CHANGE UP
SPECIMEN SOURCE: SIGNIFICANT CHANGE UP
WBC # BLD: 5.27 K/UL — SIGNIFICANT CHANGE UP (ref 3.8–10.5)
WBC # FLD AUTO: 5.27 K/UL — SIGNIFICANT CHANGE UP (ref 3.8–10.5)

## 2017-06-10 PROCEDURE — 93971 EXTREMITY STUDY: CPT

## 2017-06-10 PROCEDURE — 85730 THROMBOPLASTIN TIME PARTIAL: CPT

## 2017-06-10 PROCEDURE — 80053 COMPREHEN METABOLIC PANEL: CPT

## 2017-06-10 PROCEDURE — 85027 COMPLETE CBC AUTOMATED: CPT

## 2017-06-10 PROCEDURE — 80048 BASIC METABOLIC PNL TOTAL CA: CPT

## 2017-06-10 PROCEDURE — 96375 TX/PRO/DX INJ NEW DRUG ADDON: CPT | Mod: XU

## 2017-06-10 PROCEDURE — C1725: CPT

## 2017-06-10 PROCEDURE — 85652 RBC SED RATE AUTOMATED: CPT

## 2017-06-10 PROCEDURE — 86901 BLOOD TYPING SEROLOGIC RH(D): CPT

## 2017-06-10 PROCEDURE — 80202 ASSAY OF VANCOMYCIN: CPT

## 2017-06-10 PROCEDURE — 87040 BLOOD CULTURE FOR BACTERIA: CPT

## 2017-06-10 PROCEDURE — 82550 ASSAY OF CK (CPK): CPT

## 2017-06-10 PROCEDURE — 81001 URINALYSIS AUTO W/SCOPE: CPT

## 2017-06-10 PROCEDURE — 86850 RBC ANTIBODY SCREEN: CPT

## 2017-06-10 PROCEDURE — 99285 EMERGENCY DEPT VISIT HI MDM: CPT | Mod: 25

## 2017-06-10 PROCEDURE — 85610 PROTHROMBIN TIME: CPT

## 2017-06-10 PROCEDURE — C1769: CPT

## 2017-06-10 PROCEDURE — C1887: CPT

## 2017-06-10 PROCEDURE — 99261: CPT

## 2017-06-10 PROCEDURE — C1894: CPT

## 2017-06-10 PROCEDURE — 93925 LOWER EXTREMITY STUDY: CPT

## 2017-06-10 PROCEDURE — 73590 X-RAY EXAM OF LOWER LEG: CPT

## 2017-06-10 PROCEDURE — 86900 BLOOD TYPING SEROLOGIC ABO: CPT

## 2017-06-10 PROCEDURE — 75635 CT ANGIO ABDOMINAL ARTERIES: CPT

## 2017-06-10 PROCEDURE — C1760: CPT

## 2017-06-10 PROCEDURE — 86140 C-REACTIVE PROTEIN: CPT

## 2017-06-10 PROCEDURE — 96374 THER/PROPH/DIAG INJ IV PUSH: CPT | Mod: XU

## 2017-06-10 RX ORDER — INSULIN HUMAN 100 [IU]/ML
0 INJECTION, SOLUTION SUBCUTANEOUS
Qty: 0 | Refills: 0 | COMMUNITY

## 2017-06-10 RX ORDER — SEVELAMER CARBONATE 2400 MG/1
3 POWDER, FOR SUSPENSION ORAL
Qty: 0 | Refills: 0 | COMMUNITY

## 2017-06-10 RX ADMIN — SEVELAMER CARBONATE 2400 MILLIGRAM(S): 2400 POWDER, FOR SUSPENSION ORAL at 15:56

## 2017-06-10 RX ADMIN — CINACALCET 60 MILLIGRAM(S): 30 TABLET, FILM COATED ORAL at 12:36

## 2017-06-10 RX ADMIN — Medication 100 MILLIGRAM(S): at 15:57

## 2017-06-10 RX ADMIN — Medication 40 MILLIGRAM(S): at 05:29

## 2017-06-10 RX ADMIN — SEVELAMER CARBONATE 2400 MILLIGRAM(S): 2400 POWDER, FOR SUSPENSION ORAL at 09:25

## 2017-06-10 RX ADMIN — Medication 100 MILLIGRAM(S): at 05:29

## 2017-06-10 RX ADMIN — LISINOPRIL 40 MILLIGRAM(S): 2.5 TABLET ORAL at 05:29

## 2017-06-10 RX ADMIN — Medication 1 TABLET(S): at 12:36

## 2017-06-10 RX ADMIN — DULOXETINE HYDROCHLORIDE 60 MILLIGRAM(S): 30 CAPSULE, DELAYED RELEASE ORAL at 12:36

## 2017-06-10 RX ADMIN — Medication 50 MILLIGRAM(S): at 05:29

## 2017-06-10 RX ADMIN — Medication 81 MILLIGRAM(S): at 12:36

## 2017-06-10 NOTE — DISCHARGE NOTE ADULT - MEDICATION SUMMARY - MEDICATIONS TO TAKE
I will START or STAY ON the medications listed below when I get home from the hospital:    aspirin 81 mg oral delayed release tablet  -- 1 tab(s) by mouth once a day  -- Indication: For CAD (coronary artery disease)    oxycodone-acetaminophen 5mg-325mg oral tablet  -- 1 tab(s) by mouth once (at bedtime), As Needed -for moderate pain MDD:1  -- Indication: For Pain in both lower extremities    lisinopril 40 mg oral tablet  -- 1 tab(s) by mouth once a day  -- Indication: For HTN (hypertension)    DULoxetine 60 mg oral delayed release capsule  -- 1 cap(s) by mouth once a day  -- Indication: For insomnia    NovoLOG 100 units/mL subcutaneous solution  --  subcutaneous   Novolog  Insulin Pump    Basal rate start at 0000 at 0.40 units /hr  0330 at 0.50 units/hr  0600 at 0.425 units/hr   Insulin carb ratio   0000 :1.9 units/gram  1000:  1.10unit/gram   1500: 1.9 units/gram   Insulin sensitivity factor   0000 ISF 60  0700 ISF 40  1800 ISF 60  BGT   0000: 110-130 mg/dl   0800 :  100-120            -- Indication: For Uncontrolled type 1 diabetes mellitus without complication    atorvastatin 20 mg oral tablet  -- 1 tab(s) by mouth once a day (at bedtime)  -- Indication: For HLD    clopidogrel 75 mg oral tablet  -- 1 tab(s) by mouth once a day (at bedtime)  -- Indication: For CAD (coronary artery disease)    metoprolol succinate 50 mg oral tablet, extended release  -- 1 tab(s) by mouth once a day  -- Indication: For HTN (hypertension)    furosemide 40 mg oral tablet  -- 1 tab(s) by mouth 3 times a week on Monday , Friday and Sunday  -- Indication: For ESRD (end stage renal disease)    cinacalcet 60 mg oral tablet  -- 1 tab(s) by mouth once a day  -- Indication: For Hyperparathyroidism, secondary renal    Renvela 800 mg oral tablet  -- 3 tab(s) by mouth 3 times a day  -- Indication: For ESRD (end stage renal disease)    hydrALAZINE 25 mg oral tablet  -- 4 tab(s) by mouth every 8 hours  -- Indication: For HTN (hypertension)    Multiple Vitamins oral tablet  -- 1 tab(s) by mouth once a day  -- Indication: For SUPPLEMENT

## 2017-06-10 NOTE — PROGRESS NOTE ADULT - PROBLEM SELECTOR PROBLEM 2
CAD (coronary artery disease)
Cellulitis

## 2017-06-10 NOTE — DISCHARGE NOTE ADULT - CARE PROVIDER_API CALL
Lance Elizalde), Surgery; Vascular Surgery  [id number]  [place] [address]  Phone: [phone]  Fax: [phone]    Tee Biswas), Cardiovascular Disease; Internal Medicine  [address] [place] [address]  Phone: [phone]  Fax: [phone]    Daisy Burger (DO), Nephrology  [address] [address]  Phone: [phone]  Fax: [phone]

## 2017-06-10 NOTE — PROGRESS NOTE ADULT - PROVIDER SPECIALTY LIST ADULT
Endocrinology
Endocrinology
Infectious Disease
Infectious Disease
Internal Medicine
Nephrology
Vascular Surgery
Nephrology
Endocrinology

## 2017-06-10 NOTE — PROGRESS NOTE ADULT - SUBJECTIVE AND OBJECTIVE BOX
Patient is a 61y old  Female who presents with a chief complaint of pain  and swelling in left leg (04 Jun 2017 03:44)      SUBJECTIVE / OVERNIGHT EVENTS:  Review of Systems  chest pain no  palpitations no  sob no  nausea no  headache no    MEDICATIONS  (STANDING):  dextrose 5%. 1000milliLiter(s) IV Continuous <Continuous>  dextrose 50% Injectable 12.5Gram(s) IV Push once  dextrose 50% Injectable 25Gram(s) IV Push once  dextrose 50% Injectable 25Gram(s) IV Push once  heparin  Injectable 5000Unit(s) SubCutaneous every 12 hours  aspirin enteric coated 81milliGRAM(s) Oral daily  lisinopril 40milliGRAM(s) Oral daily  DULoxetine 60milliGRAM(s) Oral daily  atorvastatin 20milliGRAM(s) Oral at bedtime  clopidogrel Tablet 75milliGRAM(s) Oral at bedtime  metoprolol succinate ER 50milliGRAM(s) Oral daily  furosemide    Tablet 40milliGRAM(s) Oral every 48 hours  cinacalcet 60milliGRAM(s) Oral daily  sevelamer hydrochloride 2400milliGRAM(s) Oral three times a day with meals  hydrALAZINE 100milliGRAM(s) Oral every 8 hours  multivitamin 1Tablet(s) Oral daily  insulin aspart (NovoLOG) Pump 1Each SubCutaneous Continuous Pump    MEDICATIONS  (PRN):  dextrose Gel 1Dose(s) Oral once PRN Blood Glucose LESS THAN 70 milliGRAM(s)/deciliter  glucagon  Injectable 1milliGRAM(s) IntraMuscular once PRN Glucose LESS THAN 70 milligrams/deciliter  acetaminophen   Tablet. 650milliGRAM(s) Oral every 6 hours PRN Mild Pain (1 - 3)  oxyCODONE  5 mG/acetaminophen 325 mG 1Tablet(s) Oral every 4 hours PRN Moderate Pain (4 - 6)  HYDROmorphone  Injectable 0.5milliGRAM(s) IV Push every 6 hours PRN Severe Pain (7 - 10)      Vital Signs Last 24 Hrs  T(C): 36.9, Max: 36.9 (06-09 @ 17:02)  HR: 71 (70 - 80)  BP: 152/67 (147/72 - 177/67)  RR: 18 (18 - 18)  SpO2: 97% (95% - 98%)  Wt(kg): --  CAPILLARY BLOOD GLUCOSE  116 (10 Christian 2017 08:35)  333 (09 Jun 2017 21:38)  273 (09 Jun 2017 17:18)  232 (09 Jun 2017 16:03)  224 (09 Jun 2017 13:24)    I&O's Summary    I & Os for current day (as of 10 Christian 2017 12:49)  =============================================  IN: 0 ml / OUT: 0 ml / NET: 0 ml      PHYSICAL EXAM:  GENERAL: NAD, well-developed  HEAD:  Atraumatic, Normocephalic  EYES: EOMI, PERRLA, conjunctiva and sclera clear  NECK: Supple, No JVD  CHEST/LUNG: Clear to auscultation bilaterally; No wheeze  HEART: Regular rate and rhythm; No murmurs, rubs, or gallops  ABDOMEN: Soft, Nontender, Nondistended; Bowel sounds present  EXTREMITIES:  2+ Peripheral Pulses, No clubbing, cyanosis, or edema  PSYCH: AAOx3  NEUROLOGY: non-focal  SKIN: No rashes or lesions    LABS:                        8.2    5.27  )-----------( 215      ( 10 Christian 2017 10:25 )             25.8     06-10    134<L>  |  96  |  35<H>  ----------------------------<  106<H>  5.0   |  21<L>  |  6.60<H>    Ca    8.5      10 Christian 2017 10:27      PT/INR - ( 09 Jun 2017 07:27 )   PT: 10.6 sec;   INR: 0.94 ratio         PTT - ( 09 Jun 2017 07:27 )  PTT:31.4 sec          RADIOLOGY & ADDITIONAL TESTS:    Imaging Personally Reviewed:    Consultant(s) Notes Reviewed:      Care Discussed with Consultants/Other Providers: Patient is a 61y old  Female who presents with a chief complaint of pain  and swelling in left leg (04 Jun 2017 03:44)      SUBJECTIVE / OVERNIGHT EVENTS: Feels great. Wants to go home  Review of Systems  chest pain no  palpitations no  sob no  nausea no  headache no    MEDICATIONS  (STANDING):  dextrose 5%. 1000milliLiter(s) IV Continuous <Continuous>  dextrose 50% Injectable 12.5Gram(s) IV Push once  dextrose 50% Injectable 25Gram(s) IV Push once  dextrose 50% Injectable 25Gram(s) IV Push once  heparin  Injectable 5000Unit(s) SubCutaneous every 12 hours  aspirin enteric coated 81milliGRAM(s) Oral daily  lisinopril 40milliGRAM(s) Oral daily  DULoxetine 60milliGRAM(s) Oral daily  atorvastatin 20milliGRAM(s) Oral at bedtime  clopidogrel Tablet 75milliGRAM(s) Oral at bedtime  metoprolol succinate ER 50milliGRAM(s) Oral daily  furosemide    Tablet 40milliGRAM(s) Oral every 48 hours  cinacalcet 60milliGRAM(s) Oral daily  sevelamer hydrochloride 2400milliGRAM(s) Oral three times a day with meals  hydrALAZINE 100milliGRAM(s) Oral every 8 hours  multivitamin 1Tablet(s) Oral daily  insulin aspart (NovoLOG) Pump 1Each SubCutaneous Continuous Pump    MEDICATIONS  (PRN):  dextrose Gel 1Dose(s) Oral once PRN Blood Glucose LESS THAN 70 milliGRAM(s)/deciliter  glucagon  Injectable 1milliGRAM(s) IntraMuscular once PRN Glucose LESS THAN 70 milligrams/deciliter  acetaminophen   Tablet. 650milliGRAM(s) Oral every 6 hours PRN Mild Pain (1 - 3)  oxyCODONE  5 mG/acetaminophen 325 mG 1Tablet(s) Oral every 4 hours PRN Moderate Pain (4 - 6)  HYDROmorphone  Injectable 0.5milliGRAM(s) IV Push every 6 hours PRN Severe Pain (7 - 10)      Vital Signs Last 24 Hrs  T(C): 36.9, Max: 36.9 (06-09 @ 17:02)  HR: 71 (70 - 80)  BP: 152/67 (147/72 - 177/67)  RR: 18 (18 - 18)  SpO2: 97% (95% - 98%)  Wt(kg): --  CAPILLARY BLOOD GLUCOSE  116 (10 Christian 2017 08:35)  333 (09 Jun 2017 21:38)  273 (09 Jun 2017 17:18)  232 (09 Jun 2017 16:03)  224 (09 Jun 2017 13:24)    I&O's Summary    I & Os for current day (as of 10 Christian 2017 12:49)  =============================================  IN: 0 ml / OUT: 0 ml / NET: 0 ml      PHYSICAL EXAM:  GENERAL: NAD, well-developed  HEAD:  Atraumatic, Normocephalic  EYES: EOMI, PERRLA, conjunctiva and sclera clear  NECK: Supple, No JVD  CHEST/LUNG: Clear to auscultation bilaterally; No wheeze  HEART: Regular rate and rhythm; No murmurs, rubs, or gallops  ABDOMEN: Soft, Nontender, Nondistended; Bowel sounds present  EXTREMITIES:  2+ Peripheral Pulses, No clubbing, cyanosis, or edema  PSYCH: AAOx3  NEUROLOGY: non-focal  SKIN: No rashes or lesions    LABS:                        8.2    5.27  )-----------( 215      ( 10 Christian 2017 10:25 )             25.8     06-10    134<L>  |  96  |  35<H>  ----------------------------<  106<H>  5.0   |  21<L>  |  6.60<H>    Ca    8.5      10 Christian 2017 10:27      PT/INR - ( 09 Jun 2017 07:27 )   PT: 10.6 sec;   INR: 0.94 ratio         PTT - ( 09 Jun 2017 07:27 )  PTT:31.4 sec          RADIOLOGY & ADDITIONAL TESTS:    Imaging Personally Reviewed:    Consultant(s) Notes Reviewed:      Care Discussed with Consultants/Other Providers:

## 2017-06-10 NOTE — DISCHARGE NOTE ADULT - PLAN OF CARE
stable follow up with vascular in 1-2 weeks cont current home meds, follow up with nephrologist controlled cont current home med cont using insulin pump and follow up with your endocrinologist

## 2017-06-10 NOTE — PROGRESS NOTE ADULT - PROBLEM SELECTOR PLAN 4
antibiotherapy finished. Observe off antibiotherapy
antibiotherapy

## 2017-06-10 NOTE — PROGRESS NOTE ADULT - SUBJECTIVE AND OBJECTIVE BOX
[facility]   [phone]  Dr. Urban (covering for Dr. Burger)  DIALYSIS NOTE  Chief Complaint: ESRD/Ongoing hemodialysis requirement. seen on hd    24 hour events/subjective:   s/p PTA LLE yesterday    ALLERGIES & MEDICATIONS  --------------------------------------------------------------------------------  Allergies    No Known Allergies    Intolerances      Standing Inpatient Medications  dextrose 5%. 1000milliLiter(s) IV Continuous <Continuous>  dextrose 50% Injectable 12.5Gram(s) IV Push once  dextrose 50% Injectable 25Gram(s) IV Push once  dextrose 50% Injectable 25Gram(s) IV Push once  heparin  Injectable 5000Unit(s) SubCutaneous every 12 hours  aspirin enteric coated 81milliGRAM(s) Oral daily  lisinopril 40milliGRAM(s) Oral daily  DULoxetine 60milliGRAM(s) Oral daily  atorvastatin 20milliGRAM(s) Oral at bedtime  clopidogrel Tablet 75milliGRAM(s) Oral at bedtime  metoprolol succinate ER 50milliGRAM(s) Oral daily  furosemide    Tablet 40milliGRAM(s) Oral every 48 hours  cinacalcet 60milliGRAM(s) Oral daily  sevelamer hydrochloride 2400milliGRAM(s) Oral three times a day with meals  hydrALAZINE 100milliGRAM(s) Oral every 8 hours  multivitamin 1Tablet(s) Oral daily  insulin aspart (NovoLOG) Pump 1Each SubCutaneous Continuous Pump    PRN Inpatient Medications  dextrose Gel 1Dose(s) Oral once PRN  glucagon  Injectable 1milliGRAM(s) IntraMuscular once PRN  acetaminophen   Tablet. 650milliGRAM(s) Oral every 6 hours PRN  oxyCODONE  5 mG/acetaminophen 325 mG 1Tablet(s) Oral every 4 hours PRN  HYDROmorphone  Injectable 0.5milliGRAM(s) IV Push every 6 hours PRN      REVIEW OF SYSTEMS  --------------------------------------------------------------------------------  Gen: No  fevers/chills, weakness    Respiratory: No dyspnea, cough, wheezing,   CV: No chest pain, PND, orthopnea   GI: No abdominal pain, diarrhea, nausea, vomiting,    denies no edema  Neuro: No dizziness/lightheadedness, weakness,   All other systems were reviewed and are negative, except as noted.    VITALS/PHYSICAL EXAM  --------------------------------------------------------------------------------  T(C): 36.8, Max: 36.9 (06-09 @ 17:02)  HR: 72 (70 - 80)  BP: 174/71 (147/72 - 177/67)  RR: 18 (18 - 18)  SpO2: 97% (95% - 98%)  Wt(kg): --        I & Os for current day (as of 06-10-17 @ 09:30)  =============================================  IN: 0 ml / OUT: 0 ml / NET: 0 ml    Physical Exam:  	Gen: NAD, well-appearing  	HEENT: PERRL, supple neck, clear oropharynx  	Pulm: CTA B/L anterior/lateral fields  	CV: RRR, S1S2; no rub  	Abd: +BS, soft, nontender/nondistended  	LE:  trace B/L LE edema  	Skin: Warm,   	Vascular access: LUE AV access being used for HD    LABS/STUDIES  --------------------------------------------------------------------------------              9.1    3.94  >-----------<  237      [06-09-17 @ 07:27]              29.2     138  |  96  |  23  ----------------------------<  96      [06-09-17 @ 07:30]  4.7   |  23  |  4.25        Ca     8.0     [06-09-17 @ 07:30]      PT/INR: PT 10.6 , INR 0.94       [06-09-17 @ 07:27]  PTT: 31.4       [06-09-17 @ 07:27]        Assessment/Plan    60 y/o female with ESRD on HD, HTN, PAD, s/p treatment for cellulitis    ESRD: HD today, tolerating treatment, T/T/S schedule    Continue Sensipar, phos binders for bone / mineral metabolism    Continue lasix for volume maintenance    See HD flow sheet for orders    HTN: Continue Lisinopril, Metoprolol and hydralazine as ordered    PAD: s/p PTA.  on beta blocker and plavix    Pain control

## 2017-06-10 NOTE — PROGRESS NOTE ADULT - PROBLEM SELECTOR PROBLEM 3
ESRD (end stage renal disease)
Essential hypertension

## 2017-06-10 NOTE — DISCHARGE NOTE ADULT - HOSPITAL COURSE
to be completed by Attending 61 F pmh DM, HTN, HLD, CAD, esrd on HD t/th/sat, CVA w memory deficit p/w left leg pain at HD today.  Pt was dialyzed for 30mins then pain was too strong and came to ED.  Pt has had multiple similar episodes of leg pain and been admitted in past.  Leg feels swollen and cramping, 10/10 pain.  tylenol did not relieve pain.  Pain is better with standing and worse with sitting.  6/3 Had hemodialysis  6/5 PAD- s/p CTA. Vascular following. pending arterial doppler. On ASA and Plavix  6/5 night: hypoglycemic (FS 53), asymptomatic. Repeated  after po intake.   6/6 Redose vanco p HD today, f/u vascular  6/7 arterial doppler  today  6/7 night NP - fingerstick 82 at 8pm, was going to eat so bolused herself; after 2 bites of chicken felt full, couldn't eat anymore. Bedtime f.s. 9:30 was 62 (repeat 64), pt had juice and repeat 68/73, had milk and repeat was 114. 3am fingerstick check was 215. Pt did not bolus herself (because she wouldn't normally at this time)  6/8 (night NP): for angiogram today. hold off HD until angiogram is completed.dx: pain to maribell lower ext's started on percocoet         Cellulitis - IV Vanco ESRD s/p cva IDDM

## 2017-06-10 NOTE — DISCHARGE NOTE ADULT - PATIENT PORTAL LINK FT
“You can access the Follow[employer] Patient Portal, offered by [facility], by registering with the following website: [email or web address]”

## 2017-06-10 NOTE — DISCHARGE NOTE ADULT - SECONDARY DIAGNOSIS.
ESRD (end stage renal disease) HTN (hypertension) Type 1 diabetes mellitus with diabetic peripheral angiopathy without gangrene

## 2017-06-10 NOTE — DISCHARGE NOTE ADULT - CARE PLAN
Principal Discharge DX:	Femoral artery stenosis, left  Goal:	stable  Instructions for follow-up, activity and diet:	follow up with vascular in 1-2 weeks  Secondary Diagnosis:	ESRD (end stage renal disease)  Goal:	stable  Instructions for follow-up, activity and diet:	cont current home meds, follow up with nephrologist  Secondary Diagnosis:	HTN (hypertension)  Goal:	controlled  Instructions for follow-up, activity and diet:	cont current home med  Secondary Diagnosis:	Type 1 diabetes mellitus with diabetic peripheral angiopathy without gangrene  Goal:	controlled  Instructions for follow-up, activity and diet:	cont using insulin pump and follow up with your endocrinologist

## 2017-06-10 NOTE — PROGRESS NOTE ADULT - PROBLEM SELECTOR PLAN 5
vascular follow up  S/P angiogram left leg  Cleared by vascular for DC
vascular follow up  possible angiogram left leg
vascular follow up
vascular follow up  S/P angiogram left leg

## 2017-06-10 NOTE — CHART NOTE - NSCHARTNOTEFT_GEN_A_CORE
VASCULAR SURGERY    STATUS POST: L fem-pop angioplasty      POST OPERATIVE DAY 0       SUBJECTIVE:   Denies pain.       OBJECTIVE:   T(C): 36.8, Max: 36.9 (06-09 @ 17:02)  HR: 73 (70 - 80)  BP: 171/73 (147/72 - 177/67)  RR: 18 (18 - 18)  SpO2: 98% (95% - 98%)  Wt(kg): --      I&O's Detail  I & Os for 24h ending 09 Jun 2017 07:00  =============================================  IN:    Oral Fluid: 600 ml    Total IN: 600 ml  ---------------------------------------------  OUT:    Total OUT: 0 ml  ---------------------------------------------  Total NET: 600 ml    I & Os for current day (as of 10 Christian 2017 01:48)  =============================================  IN:    Total IN: 0 ml  ---------------------------------------------  OUT:    Total OUT: 0 ml  ---------------------------------------------  Total NET: 0 ml      Physical exam:  General: NAD  RLE groin access site w/ dressing soft, no hematoma      MEDICATIONS  (STANDING):  dextrose 5%. 1000milliLiter(s) IV Continuous <Continuous>  dextrose 50% Injectable 12.5Gram(s) IV Push once  dextrose 50% Injectable 25Gram(s) IV Push once  dextrose 50% Injectable 25Gram(s) IV Push once  heparin  Injectable 5000Unit(s) SubCutaneous every 12 hours  aspirin enteric coated 81milliGRAM(s) Oral daily  lisinopril 40milliGRAM(s) Oral daily  DULoxetine 60milliGRAM(s) Oral daily  atorvastatin 20milliGRAM(s) Oral at bedtime  clopidogrel Tablet 75milliGRAM(s) Oral at bedtime  metoprolol succinate ER 50milliGRAM(s) Oral daily  furosemide    Tablet 40milliGRAM(s) Oral every 48 hours  cinacalcet 60milliGRAM(s) Oral daily  sevelamer hydrochloride 2400milliGRAM(s) Oral three times a day with meals  hydrALAZINE 100milliGRAM(s) Oral every 8 hours  multivitamin 1Tablet(s) Oral daily  insulin aspart (NovoLOG) Pump 1Each SubCutaneous Continuous Pump    MEDICATIONS  (PRN):  dextrose Gel 1Dose(s) Oral once PRN Blood Glucose LESS THAN 70 milliGRAM(s)/deciliter  glucagon  Injectable 1milliGRAM(s) IntraMuscular once PRN Glucose LESS THAN 70 milligrams/deciliter  acetaminophen   Tablet. 650milliGRAM(s) Oral every 6 hours PRN Mild Pain (1 - 3)  oxyCODONE  5 mG/acetaminophen 325 mG 1Tablet(s) Oral every 4 hours PRN Moderate Pain (4 - 6)  HYDROmorphone  Injectable 0.5milliGRAM(s) IV Push every 6 hours PRN Severe Pain (7 - 10)      LABS:                        9.1    3.94  )-----------( 237      ( 09 Jun 2017 07:27 )             29.2     06-09    138  |  96  |  23  ----------------------------<  96  4.7   |  23  |  4.25<H>    Ca    8.0<L>      09 Jun 2017 07:30      PT/INR - ( 09 Jun 2017 07:27 )   PT: 10.6 sec;   INR: 0.94 ratio         PTT - ( 09 Jun 2017 07:27 )  PTT:31.4 sec      RADIOLOGY & ADDITIONAL STUDIES:    Assessment/Plan: 61y Female s/p L fem-pop angioplasty  - Resume AC  - Renal diet  - Will reassess in AM

## 2017-06-10 NOTE — DISCHARGE NOTE ADULT - MEDICATION SUMMARY - MEDICATIONS TO CHANGE
I will SWITCH the dose or number of times a day I take the medications listed below when I get home from the hospital:    oxycodone-acetaminophen 5mg-325mg oral tablet  -- 2 tab(s) by mouth once a day (in the evening), As Needed

## 2017-06-10 NOTE — PROGRESS NOTE ADULT - PROBLEM SELECTOR PROBLEM 5
Peripheral vascular disease

## 2017-06-15 ENCOUNTER — INPATIENT (INPATIENT)
Facility: HOSPITAL | Age: 62
LOS: 4 days | Discharge: HOME | DRG: 637 | End: 2017-06-20
Attending: INTERNAL MEDICINE | Admitting: INTERNAL MEDICINE
Payer: MEDICARE

## 2017-06-15 VITALS
SYSTOLIC BLOOD PRESSURE: 173 MMHG | HEART RATE: 102 BPM | RESPIRATION RATE: 20 BRPM | OXYGEN SATURATION: 100 % | DIASTOLIC BLOOD PRESSURE: 82 MMHG

## 2017-06-15 DIAGNOSIS — Z98.89 OTHER SPECIFIED POSTPROCEDURAL STATES: Chronic | ICD-10-CM

## 2017-06-15 DIAGNOSIS — R07.9 CHEST PAIN, UNSPECIFIED: ICD-10-CM

## 2017-06-15 LAB
ALBUMIN SERPL ELPH-MCNC: 4.2 G/DL — SIGNIFICANT CHANGE UP (ref 3.3–5)
ALP SERPL-CCNC: 312 U/L — HIGH (ref 40–120)
ALT FLD-CCNC: 15 U/L RC — SIGNIFICANT CHANGE UP (ref 10–45)
ANION GAP SERPL CALC-SCNC: 25 MMOL/L — HIGH (ref 5–17)
APTT BLD: 31.3 SEC — SIGNIFICANT CHANGE UP (ref 27.5–37.4)
AST SERPL-CCNC: 29 U/L — SIGNIFICANT CHANGE UP (ref 10–40)
BASOPHILS # BLD AUTO: 0 K/UL — SIGNIFICANT CHANGE UP (ref 0–0.2)
BASOPHILS NFR BLD AUTO: 0.5 % — SIGNIFICANT CHANGE UP (ref 0–2)
BILIRUB SERPL-MCNC: 0.4 MG/DL — SIGNIFICANT CHANGE UP (ref 0.2–1.2)
BUN SERPL-MCNC: 20 MG/DL — SIGNIFICANT CHANGE UP (ref 7–23)
CALCIUM SERPL-MCNC: 9.2 MG/DL — SIGNIFICANT CHANGE UP (ref 8.4–10.5)
CHLORIDE SERPL-SCNC: 90 MMOL/L — LOW (ref 96–108)
CK MB BLD-MCNC: 1.5 % — SIGNIFICANT CHANGE UP (ref 0–3.5)
CK MB BLD-MCNC: 2.1 % — SIGNIFICANT CHANGE UP (ref 0–3.5)
CK MB CFR SERPL CALC: 3.6 NG/ML — SIGNIFICANT CHANGE UP (ref 0–3.8)
CK MB CFR SERPL CALC: 4.8 NG/ML — HIGH (ref 0–3.8)
CK SERPL-CCNC: 234 U/L — HIGH (ref 25–170)
CK SERPL-CCNC: 240 U/L — HIGH (ref 25–170)
CO2 SERPL-SCNC: 23 MMOL/L — SIGNIFICANT CHANGE UP (ref 22–31)
CREAT SERPL-MCNC: 4.46 MG/DL — HIGH (ref 0.5–1.3)
EOSINOPHIL # BLD AUTO: 0 K/UL — SIGNIFICANT CHANGE UP (ref 0–0.5)
EOSINOPHIL NFR BLD AUTO: 0.2 % — SIGNIFICANT CHANGE UP (ref 0–6)
GAS PNL BLDV: SIGNIFICANT CHANGE UP
GLUCOSE SERPL-MCNC: 504 MG/DL — CRITICAL HIGH (ref 70–99)
HCT VFR BLD CALC: 29.6 % — LOW (ref 34.5–45)
HGB BLD-MCNC: 9.5 G/DL — LOW (ref 11.5–15.5)
INR BLD: 0.98 RATIO — SIGNIFICANT CHANGE UP (ref 0.88–1.16)
INR BLD: 1.1 RATIO — SIGNIFICANT CHANGE UP (ref 0.88–1.16)
LIDOCAIN IGE QN: 40 U/L — SIGNIFICANT CHANGE UP (ref 7–60)
LYMPHOCYTES # BLD AUTO: 0.7 K/UL — LOW (ref 1–3.3)
LYMPHOCYTES # BLD AUTO: 8.5 % — LOW (ref 13–44)
MCHC RBC-ENTMCNC: 32.1 GM/DL — SIGNIFICANT CHANGE UP (ref 32–36)
MCHC RBC-ENTMCNC: 33.3 PG — SIGNIFICANT CHANGE UP (ref 27–34)
MCV RBC AUTO: 104 FL — HIGH (ref 80–100)
MONOCYTES # BLD AUTO: 0.6 K/UL — SIGNIFICANT CHANGE UP (ref 0–0.9)
MONOCYTES NFR BLD AUTO: 6.4 % — SIGNIFICANT CHANGE UP (ref 2–14)
NEUTROPHILS # BLD AUTO: 7.4 K/UL — SIGNIFICANT CHANGE UP (ref 1.8–7.4)
NEUTROPHILS NFR BLD AUTO: 84.4 % — HIGH (ref 43–77)
PLATELET # BLD AUTO: 234 K/UL — SIGNIFICANT CHANGE UP (ref 150–400)
POTASSIUM SERPL-MCNC: 5.2 MMOL/L — SIGNIFICANT CHANGE UP (ref 3.5–5.3)
POTASSIUM SERPL-SCNC: 5.2 MMOL/L — SIGNIFICANT CHANGE UP (ref 3.5–5.3)
PROT SERPL-MCNC: 7.7 G/DL — SIGNIFICANT CHANGE UP (ref 6–8.3)
PROTHROM AB SERPL-ACNC: 10.6 SEC — SIGNIFICANT CHANGE UP (ref 9.8–12.7)
PROTHROM AB SERPL-ACNC: 11.9 SEC — SIGNIFICANT CHANGE UP (ref 9.8–12.7)
RBC # BLD: 2.85 M/UL — LOW (ref 3.8–5.2)
RBC # FLD: 13.7 % — SIGNIFICANT CHANGE UP (ref 10.3–14.5)
SODIUM SERPL-SCNC: 138 MMOL/L — SIGNIFICANT CHANGE UP (ref 135–145)
TROPONIN T SERPL-MCNC: 0.1 NG/ML — HIGH (ref 0–0.06)
TROPONIN T SERPL-MCNC: 0.13 NG/ML — HIGH (ref 0–0.06)
WBC # BLD: 8.7 K/UL — SIGNIFICANT CHANGE UP (ref 3.8–10.5)
WBC # FLD AUTO: 8.7 K/UL — SIGNIFICANT CHANGE UP (ref 3.8–10.5)

## 2017-06-15 PROCEDURE — 99285 EMERGENCY DEPT VISIT HI MDM: CPT

## 2017-06-15 PROCEDURE — 71020: CPT | Mod: 26

## 2017-06-15 RX ORDER — LISINOPRIL 2.5 MG/1
40 TABLET ORAL DAILY
Qty: 0 | Refills: 0 | Status: DISCONTINUED | OUTPATIENT
Start: 2017-06-15 | End: 2017-06-20

## 2017-06-15 RX ORDER — DEXTROSE 50 % IN WATER 50 %
1 SYRINGE (ML) INTRAVENOUS ONCE
Qty: 0 | Refills: 0 | Status: DISCONTINUED | OUTPATIENT
Start: 2017-06-15 | End: 2017-06-20

## 2017-06-15 RX ORDER — ATORVASTATIN CALCIUM 80 MG/1
20 TABLET, FILM COATED ORAL AT BEDTIME
Qty: 0 | Refills: 0 | Status: DISCONTINUED | OUTPATIENT
Start: 2017-06-15 | End: 2017-06-20

## 2017-06-15 RX ORDER — HYDRALAZINE HCL 50 MG
100 TABLET ORAL EVERY 8 HOURS
Qty: 0 | Refills: 0 | Status: DISCONTINUED | OUTPATIENT
Start: 2017-06-15 | End: 2017-06-20

## 2017-06-15 RX ORDER — GLUCAGON INJECTION, SOLUTION 0.5 MG/.1ML
1 INJECTION, SOLUTION SUBCUTANEOUS ONCE
Qty: 0 | Refills: 0 | Status: DISCONTINUED | OUTPATIENT
Start: 2017-06-15 | End: 2017-06-20

## 2017-06-15 RX ORDER — DULOXETINE HYDROCHLORIDE 30 MG/1
60 CAPSULE, DELAYED RELEASE ORAL DAILY
Qty: 0 | Refills: 0 | Status: DISCONTINUED | OUTPATIENT
Start: 2017-06-15 | End: 2017-06-20

## 2017-06-15 RX ORDER — INSULIN ASPART 100 [IU]/ML
1 INJECTION, SOLUTION SUBCUTANEOUS
Qty: 0 | Refills: 0 | Status: DISCONTINUED | OUTPATIENT
Start: 2017-06-15 | End: 2017-06-16

## 2017-06-15 RX ORDER — HEPARIN SODIUM 5000 [USP'U]/ML
5000 INJECTION INTRAVENOUS; SUBCUTANEOUS EVERY 12 HOURS
Qty: 0 | Refills: 0 | Status: DISCONTINUED | OUTPATIENT
Start: 2017-06-15 | End: 2017-06-20

## 2017-06-15 RX ORDER — SODIUM CHLORIDE 9 MG/ML
3 INJECTION INTRAMUSCULAR; INTRAVENOUS; SUBCUTANEOUS ONCE
Qty: 0 | Refills: 0 | Status: COMPLETED | OUTPATIENT
Start: 2017-06-15 | End: 2017-06-15

## 2017-06-15 RX ORDER — FUROSEMIDE 40 MG
40 TABLET ORAL
Qty: 0 | Refills: 0 | Status: DISCONTINUED | OUTPATIENT
Start: 2017-06-15 | End: 2017-06-20

## 2017-06-15 RX ORDER — DEXTROSE 50 % IN WATER 50 %
12.5 SYRINGE (ML) INTRAVENOUS ONCE
Qty: 0 | Refills: 0 | Status: DISCONTINUED | OUTPATIENT
Start: 2017-06-15 | End: 2017-06-20

## 2017-06-15 RX ORDER — SODIUM CHLORIDE 9 MG/ML
1000 INJECTION, SOLUTION INTRAVENOUS
Qty: 0 | Refills: 0 | Status: DISCONTINUED | OUTPATIENT
Start: 2017-06-15 | End: 2017-06-20

## 2017-06-15 RX ORDER — CINACALCET 30 MG/1
60 TABLET, FILM COATED ORAL DAILY
Qty: 0 | Refills: 0 | Status: DISCONTINUED | OUTPATIENT
Start: 2017-06-15 | End: 2017-06-20

## 2017-06-15 RX ORDER — DEXTROSE 50 % IN WATER 50 %
25 SYRINGE (ML) INTRAVENOUS ONCE
Qty: 0 | Refills: 0 | Status: DISCONTINUED | OUTPATIENT
Start: 2017-06-15 | End: 2017-06-20

## 2017-06-15 RX ORDER — CLOPIDOGREL BISULFATE 75 MG/1
75 TABLET, FILM COATED ORAL AT BEDTIME
Qty: 0 | Refills: 0 | Status: DISCONTINUED | OUTPATIENT
Start: 2017-06-15 | End: 2017-06-20

## 2017-06-15 RX ORDER — ASPIRIN/CALCIUM CARB/MAGNESIUM 324 MG
81 TABLET ORAL DAILY
Qty: 0 | Refills: 0 | Status: DISCONTINUED | OUTPATIENT
Start: 2017-06-15 | End: 2017-06-20

## 2017-06-15 RX ORDER — METOPROLOL TARTRATE 50 MG
50 TABLET ORAL DAILY
Qty: 0 | Refills: 0 | Status: DISCONTINUED | OUTPATIENT
Start: 2017-06-15 | End: 2017-06-20

## 2017-06-15 RX ORDER — SEVELAMER CARBONATE 2400 MG/1
2400 POWDER, FOR SUSPENSION ORAL
Qty: 0 | Refills: 0 | Status: DISCONTINUED | OUTPATIENT
Start: 2017-06-15 | End: 2017-06-20

## 2017-06-15 RX ADMIN — Medication 100 MILLIGRAM(S): at 22:11

## 2017-06-15 RX ADMIN — ATORVASTATIN CALCIUM 20 MILLIGRAM(S): 80 TABLET, FILM COATED ORAL at 22:11

## 2017-06-15 RX ADMIN — SODIUM CHLORIDE 3 MILLILITER(S): 9 INJECTION INTRAMUSCULAR; INTRAVENOUS; SUBCUTANEOUS at 17:02

## 2017-06-15 RX ADMIN — Medication 50 MILLIGRAM(S): at 20:39

## 2017-06-15 RX ADMIN — CLOPIDOGREL BISULFATE 75 MILLIGRAM(S): 75 TABLET, FILM COATED ORAL at 22:11

## 2017-06-15 NOTE — ED ADULT NURSE NOTE - OBJECTIVE STATEMENT
62 yo  F presents to ED AOX3 via EMS from dialysis. Pt has fistula to left upper arm, bruit and thrill present. Pt. states she had approx. 15 minutes left of dialysis (goes on Tuesday, Thurs, Sat) when she developed SOB and mid-sternal chest pain. Reports lower back pain that is constant and intermittent, non-radiating chest pain. Denies dizziness, abdominal pain, arm pain, N/V. EKG completed and given to MD, placed on CM. Lung sounds clear, breathing unlabored on RA. Skin warm pink and dry. Pt. arrives with insulin pump. Abdomen soft. States she was recently discharged from hospital after having angiogram to left leg. 62 yo  F presents to ED AOX3 via EMS from dialysis. Pt has fistula to left upper arm, bruit and thrill present. Pt. states she had approx. 15 minutes left of dialysis (goes on Tuesday, Thurs, Sat) when she developed SOB and mid-sternal chest pain. Reports lower back pain that is constant and intermittent, non-radiating chest pain. Denies dizziness, abdominal pain, arm pain, N/V. EKG completed and given to MD, placed on CM. Lung sounds clear, breathing unlabored on RA. Skin warm pink and dry. Pt. arrives with insulin pump. Abdomen soft. States she was recently discharged from hospital after having angiogram to left leg. Comfort and safety maintained. Family at bedside.

## 2017-06-15 NOTE — ED PROVIDER NOTE - OBJECTIVE STATEMENT
60yo p/w shortness of breath and palpitations during dialysis. Pt. had recent left lower extremity angio on Friday. Pt. reports that during dialysis, pt. had chest pain, shortness of breath. Pt. reports some resolution of symptoms. Pain is substernal, no aggravating/alleviating factors, pressure, constant, 5/10. Pt. took 162 aspirin by EMS. Denies nuase/vomiting, diarrhea/constipation, dysuria/hematuria, cough, fevers, sick contacts, recent travel, prolonged stasis. 62yo p/w shortness of breath and palpitations during dialysis. Pt. had recent left lower extremity angio on Friday. Pt. reports that during dialysis, pt. had chest pain, shortness of breath. Pt. reports some resolution of symptoms. Pain is substernal, no aggravating/alleviating factors, pressure, constant, 5/10. Pt. took 162mg aspirin by EMS. Denies nuase/vomiting, diarrhea/constipation, dysuria/hematuria, cough, fevers, sick contacts, recent travel, prolonged stasis.

## 2017-06-15 NOTE — ED PROVIDER NOTE - ATTENDING CONTRIBUTION TO CARE
pt is well appearing walking around er with no overt signs of sob.  pt with no cough or fever. pt from hd completed almost full session today with no chest tightness and sob resolved now. bgm over 500, this has happened before as per . recent angio of leg from per art insufficency. lungs clear, ekg with lat depression, cardiac work up, check lytes, vss. pt is well appearing walking around er with no overt signs of sob.  pt with no cough or fever. pt from hd completed almost full session today with no chest tightness and sob resolved now. bgm over 500, this has happened before as per . recent angio of leg from per art insufficency. lungs clear, ekg with lat depression, cardiac work up, check lytes, vss. cards consulted with no ekg changes, who want the private cards for the patient called, attempted.

## 2017-06-15 NOTE — H&P ADULT - HISTORY OF PRESENT ILLNESS
60yo p/w shortness of breath and palpitations during dialysis. Pt. had recent left lower extremity angio on Friday. Pt. reports that during dialysis, pt. had chest pain, shortness of breath. Pt. reports some resolution of symptoms. Pain is substernal, no aggravating/alleviating factors, pressure, constant, 5/10. Pt. took 162 mg aspirin Denies nuase/vomiting, diarrhea/constipation, dysuria/hematuria, cough, fevers, sick contacts, recent travel, prolonged stasis.

## 2017-06-15 NOTE — H&P ADULT - ASSESSMENT
61 f with multiple medical problems and chest pain  Admit telemetry  cardiac enzymes  cardiology eval called Dr. Vela  ESRD continue HD Dr. Schmidt called.  IDDM type 1 Endocrine evaluation re Insulin pump management called  further action as per clinical course

## 2017-06-15 NOTE — ED ADULT NURSE REASSESSMENT NOTE - NS ED NURSE REASSESS COMMENT FT1
patient medicated for HTN per orders. Report given to Oli Jaramillo RN. Aware patient is being medicated for HTN & Pain per orders. Pt remains on CM - Sinus tach.

## 2017-06-15 NOTE — H&P ADULT - NSHPREVIEWOFSYSTEMS_GEN_ALL_CORE
REVIEW OF SYSTEMS:    CONSTITUTIONAL: No weakness, fevers or chills  EYES/ENT: No visual changes;  No vertigo or throat pain   NECK: No pain or stiffness  RESPIRATORY: No cough, wheezing, hemoptysis; + shortness of breath  CARDIOVASCULAr chest pain  GASTROINTESTINAL: No abdominal or epigastric pain. No nausea, vomiting, or hematemesis; No diarrhea or constipation. No melena or hematochezia.  GENITOURINARY: No dysuria, frequency or hematuria  NEUROLOGICAL: No numbness or weakness  SKIN: No itching, burning, rashes, or lesions   All other review of systems is negative unless indicated above.

## 2017-06-15 NOTE — ED PROVIDER NOTE - PROGRESS NOTE DETAILS
Cardiology called for cath consult for continguous depressions in lateral leads, active chest pain. They report that this is not a STEMI equivalent and Dr. Mauro Vela should be called for possible catheterization. Dr. Vela Paged, awaiting response. Dr. Vela aware of patient presentation today. Does not want patient cathed at this time and no anticoagulation. Would like patient admitted to [facility]. Endocrine would like bolus now. Pump settings placed in orders per endocrine. Would like patient NPO until 4 hours after bolus and repeat fingerstick.

## 2017-06-15 NOTE — ED ADULT NURSE REASSESSMENT NOTE - NS ED NURSE REASSESS COMMENT FT1
Pt. has insulin pump,  pt. filled out insulin pump papers, placed in chart. As per MD Rossi, diabetes team contacted, aware that patient is admitted. Diabetes and pump education reinforced.

## 2017-06-15 NOTE — H&P ADULT - NSHPLABSRESULTS_GEN_ALL_CORE
9.5    8.7   )-----------( 234      ( 15 Christian 2017 16:41 )             29.6       06-15    138  |  90<L>  |  20  ----------------------------<  504<HH>  5.2   |  23  |  4.46<H>    Ca    9.2      15 Christian 2017 16:41    TPro  7.7  /  Alb  4.2  /  TBili  0.4  /  DBili  x   /  AST  29  /  ALT  15  /  AlkPhos  312<H>  06-15                  PT/INR - ( 15 Christian 2017 17:48 )   PT: 11.9 sec;   INR: 1.10 ratio         PTT - ( 15 Christian 2017 17:48 )  PTT:31.3 sec    Lactate Trend      CARDIAC MARKERS ( 15 Christian 2017 16:41 )  x     / 0.10 ng/mL / 240 U/L / x     / 3.6 ng/mL        CAPILLARY BLOOD GLUCOSE  445 (15 Christian 2017 15:53)        Culture Results:   No growth at 5 days. (06-05 @ 21:22)  Culture Results:   No growth at 5 days. (06-05 @ 21:22)  EKG SR st depression in lateral leads

## 2017-06-16 DIAGNOSIS — E78.5 HYPERLIPIDEMIA, UNSPECIFIED: ICD-10-CM

## 2017-06-16 DIAGNOSIS — R07.9 CHEST PAIN, UNSPECIFIED: ICD-10-CM

## 2017-06-16 DIAGNOSIS — N18.6 END STAGE RENAL DISEASE: ICD-10-CM

## 2017-06-16 DIAGNOSIS — E10.9 TYPE 1 DIABETES MELLITUS WITHOUT COMPLICATIONS: ICD-10-CM

## 2017-06-16 DIAGNOSIS — Z29.9 ENCOUNTER FOR PROPHYLACTIC MEASURES, UNSPECIFIED: ICD-10-CM

## 2017-06-16 DIAGNOSIS — E13.10 OTHER SPECIFIED DIABETES MELLITUS WITH KETOACIDOSIS WITHOUT COMA: ICD-10-CM

## 2017-06-16 DIAGNOSIS — R11.2 NAUSEA WITH VOMITING, UNSPECIFIED: ICD-10-CM

## 2017-06-16 DIAGNOSIS — E10.10 TYPE 1 DIABETES MELLITUS WITH KETOACIDOSIS WITHOUT COMA: ICD-10-CM

## 2017-06-16 DIAGNOSIS — I25.10 ATHEROSCLEROTIC HEART DISEASE OF NATIVE CORONARY ARTERY WITHOUT ANGINA PECTORIS: ICD-10-CM

## 2017-06-16 DIAGNOSIS — I10 ESSENTIAL (PRIMARY) HYPERTENSION: ICD-10-CM

## 2017-06-16 DIAGNOSIS — R06.02 SHORTNESS OF BREATH: ICD-10-CM

## 2017-06-16 LAB
ACETONE SERPL-MCNC: ABNORMAL
ACETONE SERPL-MCNC: ABNORMAL
ALBUMIN SERPL ELPH-MCNC: 3.5 G/DL — SIGNIFICANT CHANGE UP (ref 3.3–5)
ALBUMIN SERPL ELPH-MCNC: 4 G/DL — SIGNIFICANT CHANGE UP (ref 3.3–5)
ALP SERPL-CCNC: 254 U/L — HIGH (ref 40–120)
ALP SERPL-CCNC: 278 U/L — HIGH (ref 40–120)
ALT FLD-CCNC: 19 U/L RC — SIGNIFICANT CHANGE UP (ref 10–45)
ALT FLD-CCNC: 20 U/L RC — SIGNIFICANT CHANGE UP (ref 10–45)
ANION GAP SERPL CALC-SCNC: 18 MMOL/L — HIGH (ref 5–17)
ANION GAP SERPL CALC-SCNC: 19 MMOL/L — HIGH (ref 5–17)
ANION GAP SERPL CALC-SCNC: 25 MMOL/L — HIGH (ref 5–17)
ANION GAP SERPL CALC-SCNC: 36 MMOL/L — HIGH (ref 5–17)
APTT BLD: 27.9 SEC — SIGNIFICANT CHANGE UP (ref 27.5–37.4)
AST SERPL-CCNC: 29 U/L — SIGNIFICANT CHANGE UP (ref 10–40)
AST SERPL-CCNC: 33 U/L — SIGNIFICANT CHANGE UP (ref 10–40)
B-OH-BUTYR SERPL-SCNC: 3 MMOL/L — HIGH
BASE EXCESS BLDV CALC-SCNC: -2.7 MMOL/L — LOW (ref -2–2)
BASE EXCESS BLDV CALC-SCNC: 6.9 MMOL/L — HIGH (ref -2–2)
BASE EXCESS BLDV CALC-SCNC: 8.4 MMOL/L — HIGH (ref -2–2)
BASOPHILS # BLD AUTO: 0 K/UL — SIGNIFICANT CHANGE UP (ref 0–0.2)
BASOPHILS NFR BLD AUTO: 0.3 % — SIGNIFICANT CHANGE UP (ref 0–2)
BILIRUB SERPL-MCNC: 0.2 MG/DL — SIGNIFICANT CHANGE UP (ref 0.2–1.2)
BILIRUB SERPL-MCNC: 0.3 MG/DL — SIGNIFICANT CHANGE UP (ref 0.2–1.2)
BUN SERPL-MCNC: 36 MG/DL — HIGH (ref 7–23)
BUN SERPL-MCNC: 42 MG/DL — HIGH (ref 7–23)
BUN SERPL-MCNC: 47 MG/DL — HIGH (ref 7–23)
BUN SERPL-MCNC: 50 MG/DL — HIGH (ref 7–23)
CALCIUM SERPL-MCNC: 7.8 MG/DL — LOW (ref 8.4–10.5)
CALCIUM SERPL-MCNC: 8.5 MG/DL — SIGNIFICANT CHANGE UP (ref 8.4–10.5)
CALCIUM SERPL-MCNC: 8.8 MG/DL — SIGNIFICANT CHANGE UP (ref 8.4–10.5)
CALCIUM SERPL-MCNC: 9.5 MG/DL — SIGNIFICANT CHANGE UP (ref 8.4–10.5)
CHLORIDE SERPL-SCNC: 82 MMOL/L — LOW (ref 96–108)
CHLORIDE SERPL-SCNC: 91 MMOL/L — LOW (ref 96–108)
CHLORIDE SERPL-SCNC: 91 MMOL/L — LOW (ref 96–108)
CHLORIDE SERPL-SCNC: 92 MMOL/L — LOW (ref 96–108)
CO2 BLDV-SCNC: 23 MMOL/L — SIGNIFICANT CHANGE UP (ref 22–30)
CO2 BLDV-SCNC: 34 MMOL/L — HIGH (ref 22–30)
CO2 BLDV-SCNC: 34 MMOL/L — HIGH (ref 22–30)
CO2 SERPL-SCNC: 17 MMOL/L — LOW (ref 22–31)
CO2 SERPL-SCNC: 21 MMOL/L — LOW (ref 22–31)
CO2 SERPL-SCNC: 29 MMOL/L — SIGNIFICANT CHANGE UP (ref 22–31)
CO2 SERPL-SCNC: 30 MMOL/L — SIGNIFICANT CHANGE UP (ref 22–31)
CREAT SERPL-MCNC: 5.3 MG/DL — HIGH (ref 0.5–1.3)
CREAT SERPL-MCNC: 5.55 MG/DL — HIGH (ref 0.5–1.3)
CREAT SERPL-MCNC: 6.32 MG/DL — HIGH (ref 0.5–1.3)
CREAT SERPL-MCNC: 6.65 MG/DL — HIGH (ref 0.5–1.3)
EOSINOPHIL # BLD AUTO: 0 K/UL — SIGNIFICANT CHANGE UP (ref 0–0.5)
EOSINOPHIL NFR BLD AUTO: 0.1 % — SIGNIFICANT CHANGE UP (ref 0–6)
GAS PNL BLDV: SIGNIFICANT CHANGE UP
GLUCOSE SERPL-MCNC: 190 MG/DL — HIGH (ref 70–99)
GLUCOSE SERPL-MCNC: 459 MG/DL — CRITICAL HIGH (ref 70–99)
GLUCOSE SERPL-MCNC: 688 MG/DL — CRITICAL HIGH (ref 70–99)
GLUCOSE SERPL-MCNC: 78 MG/DL — SIGNIFICANT CHANGE UP (ref 70–99)
HBA1C BLD-MCNC: 7.3 % — HIGH (ref 4–5.6)
HCO3 BLDV-SCNC: 22 MMOL/L — SIGNIFICANT CHANGE UP (ref 21–29)
HCO3 BLDV-SCNC: 32 MMOL/L — HIGH (ref 21–29)
HCO3 BLDV-SCNC: 33 MMOL/L — HIGH (ref 21–29)
HCT VFR BLD CALC: 20 % — CRITICAL LOW (ref 34.5–45)
HCT VFR BLD CALC: 24 % — LOW (ref 34.5–45)
HGB BLD-MCNC: 6.4 G/DL — CRITICAL LOW (ref 11.5–15.5)
HGB BLD-MCNC: 8 G/DL — LOW (ref 11.5–15.5)
HOROWITZ INDEX BLDV+IHG-RTO: 21 — SIGNIFICANT CHANGE UP
HOROWITZ INDEX BLDV+IHG-RTO: 21 — SIGNIFICANT CHANGE UP
INR BLD: 1.16 RATIO — SIGNIFICANT CHANGE UP (ref 0.88–1.16)
LYMPHOCYTES # BLD AUTO: 0.5 K/UL — LOW (ref 1–3.3)
LYMPHOCYTES # BLD AUTO: 7.9 % — LOW (ref 13–44)
MAGNESIUM SERPL-MCNC: 2.3 MG/DL — SIGNIFICANT CHANGE UP (ref 1.6–2.6)
MAGNESIUM SERPL-MCNC: 2.5 MG/DL — SIGNIFICANT CHANGE UP (ref 1.6–2.6)
MAGNESIUM SERPL-MCNC: 2.5 MG/DL — SIGNIFICANT CHANGE UP (ref 1.6–2.6)
MCHC RBC-ENTMCNC: 32 GM/DL — SIGNIFICANT CHANGE UP (ref 32–36)
MCHC RBC-ENTMCNC: 32.8 PG — SIGNIFICANT CHANGE UP (ref 27–34)
MCHC RBC-ENTMCNC: 33.5 GM/DL — SIGNIFICANT CHANGE UP (ref 32–36)
MCHC RBC-ENTMCNC: 33.9 PG — SIGNIFICANT CHANGE UP (ref 27–34)
MCV RBC AUTO: 101 FL — HIGH (ref 80–100)
MCV RBC AUTO: 102 FL — HIGH (ref 80–100)
MONOCYTES # BLD AUTO: 0.6 K/UL — SIGNIFICANT CHANGE UP (ref 0–0.9)
MONOCYTES NFR BLD AUTO: 9.5 % — SIGNIFICANT CHANGE UP (ref 2–14)
NEUTROPHILS # BLD AUTO: 5.2 K/UL — SIGNIFICANT CHANGE UP (ref 1.8–7.4)
NEUTROPHILS NFR BLD AUTO: 82.2 % — HIGH (ref 43–77)
PCO2 BLDV: 38 MMHG — SIGNIFICANT CHANGE UP (ref 35–50)
PCO2 BLDV: 49 MMHG — SIGNIFICANT CHANGE UP (ref 35–50)
PCO2 BLDV: 53 MMHG — HIGH (ref 35–50)
PH BLDV: 7.37 — SIGNIFICANT CHANGE UP (ref 7.35–7.45)
PH BLDV: 7.4 — SIGNIFICANT CHANGE UP (ref 7.35–7.45)
PH BLDV: 7.45 — SIGNIFICANT CHANGE UP (ref 7.35–7.45)
PHOSPHATE SERPL-MCNC: 4 MG/DL — SIGNIFICANT CHANGE UP (ref 2.5–4.5)
PLAT MORPH BLD: NORMAL — SIGNIFICANT CHANGE UP
PLATELET # BLD AUTO: 208 K/UL — SIGNIFICANT CHANGE UP (ref 150–400)
PLATELET # BLD AUTO: 255 K/UL — SIGNIFICANT CHANGE UP (ref 150–400)
PO2 BLDV: 30 MMHG — SIGNIFICANT CHANGE UP (ref 25–45)
PO2 BLDV: 53 MMHG — HIGH (ref 25–45)
PO2 BLDV: 58 MMHG — HIGH (ref 25–45)
POTASSIUM SERPL-MCNC: 4.4 MMOL/L — SIGNIFICANT CHANGE UP (ref 3.5–5.3)
POTASSIUM SERPL-MCNC: 4.6 MMOL/L — SIGNIFICANT CHANGE UP (ref 3.5–5.3)
POTASSIUM SERPL-MCNC: 4.7 MMOL/L — SIGNIFICANT CHANGE UP (ref 3.5–5.3)
POTASSIUM SERPL-MCNC: 5.7 MMOL/L — HIGH (ref 3.5–5.3)
POTASSIUM SERPL-SCNC: 4.4 MMOL/L — SIGNIFICANT CHANGE UP (ref 3.5–5.3)
POTASSIUM SERPL-SCNC: 4.6 MMOL/L — SIGNIFICANT CHANGE UP (ref 3.5–5.3)
POTASSIUM SERPL-SCNC: 4.7 MMOL/L — SIGNIFICANT CHANGE UP (ref 3.5–5.3)
POTASSIUM SERPL-SCNC: 5.7 MMOL/L — HIGH (ref 3.5–5.3)
PROCALCITONIN SERPL-MCNC: 2.33 NG/ML — HIGH (ref 0–0.04)
PROT SERPL-MCNC: 6.1 G/DL — SIGNIFICANT CHANGE UP (ref 6–8.3)
PROT SERPL-MCNC: 6.8 G/DL — SIGNIFICANT CHANGE UP (ref 6–8.3)
PROTHROM AB SERPL-ACNC: 12.6 SEC — SIGNIFICANT CHANGE UP (ref 9.8–12.7)
RBC # BLD: 1.95 M/UL — LOW (ref 3.8–5.2)
RBC # BLD: 2.37 M/UL — LOW (ref 3.8–5.2)
RBC # FLD: 13.5 % — SIGNIFICANT CHANGE UP (ref 10.3–14.5)
RBC # FLD: 13.6 % — SIGNIFICANT CHANGE UP (ref 10.3–14.5)
RBC BLD AUTO: SIGNIFICANT CHANGE UP
SAO2 % BLDV: 50 % — LOW (ref 67–88)
SAO2 % BLDV: 87 % — SIGNIFICANT CHANGE UP (ref 67–88)
SAO2 % BLDV: 88 % — SIGNIFICANT CHANGE UP (ref 67–88)
SODIUM SERPL-SCNC: 135 MMOL/L — SIGNIFICANT CHANGE UP (ref 135–145)
SODIUM SERPL-SCNC: 137 MMOL/L — SIGNIFICANT CHANGE UP (ref 135–145)
SODIUM SERPL-SCNC: 139 MMOL/L — SIGNIFICANT CHANGE UP (ref 135–145)
SODIUM SERPL-SCNC: 140 MMOL/L — SIGNIFICANT CHANGE UP (ref 135–145)
WBC # BLD: 6.4 K/UL — SIGNIFICANT CHANGE UP (ref 3.8–10.5)
WBC # BLD: 7.7 K/UL — SIGNIFICANT CHANGE UP (ref 3.8–10.5)
WBC # FLD AUTO: 6.4 K/UL — SIGNIFICANT CHANGE UP (ref 3.8–10.5)
WBC # FLD AUTO: 7.7 K/UL — SIGNIFICANT CHANGE UP (ref 3.8–10.5)

## 2017-06-16 PROCEDURE — 93010 ELECTROCARDIOGRAM REPORT: CPT

## 2017-06-16 PROCEDURE — 99223 1ST HOSP IP/OBS HIGH 75: CPT | Mod: GC

## 2017-06-16 PROCEDURE — 93010 ELECTROCARDIOGRAM REPORT: CPT | Mod: 77

## 2017-06-16 PROCEDURE — 99291 CRITICAL CARE FIRST HOUR: CPT

## 2017-06-16 PROCEDURE — 93306 TTE W/DOPPLER COMPLETE: CPT | Mod: 26

## 2017-06-16 RX ORDER — ONDANSETRON 8 MG/1
4 TABLET, FILM COATED ORAL ONCE
Qty: 0 | Refills: 0 | Status: COMPLETED | OUTPATIENT
Start: 2017-06-16 | End: 2017-06-16

## 2017-06-16 RX ORDER — INSULIN LISPRO 100/ML
1 VIAL (ML) SUBCUTANEOUS
Qty: 0 | Refills: 0 | Status: DISCONTINUED | OUTPATIENT
Start: 2017-06-16 | End: 2017-06-17

## 2017-06-16 RX ORDER — INSULIN HUMAN 100 [IU]/ML
100 INJECTION, SOLUTION SUBCUTANEOUS ONCE
Qty: 0 | Refills: 0 | Status: DISCONTINUED | OUTPATIENT
Start: 2017-06-16 | End: 2017-06-16

## 2017-06-16 RX ORDER — INSULIN HUMAN 100 [IU]/ML
6 INJECTION, SOLUTION SUBCUTANEOUS ONCE
Qty: 0 | Refills: 0 | Status: DISCONTINUED | OUTPATIENT
Start: 2017-06-16 | End: 2017-06-16

## 2017-06-16 RX ORDER — INSULIN LISPRO 100/ML
6 VIAL (ML) SUBCUTANEOUS ONCE
Qty: 0 | Refills: 0 | Status: COMPLETED | OUTPATIENT
Start: 2017-06-16 | End: 2017-06-16

## 2017-06-16 RX ORDER — OXYCODONE HYDROCHLORIDE 5 MG/1
5 TABLET ORAL ONCE
Qty: 0 | Refills: 0 | Status: DISCONTINUED | OUTPATIENT
Start: 2017-06-16 | End: 2017-06-16

## 2017-06-16 RX ORDER — INSULIN GLARGINE 100 [IU]/ML
10 INJECTION, SOLUTION SUBCUTANEOUS ONCE
Qty: 0 | Refills: 0 | Status: COMPLETED | OUTPATIENT
Start: 2017-06-16 | End: 2017-06-16

## 2017-06-16 RX ORDER — OXYCODONE HYDROCHLORIDE 5 MG/1
5 TABLET ORAL EVERY 6 HOURS
Qty: 0 | Refills: 0 | Status: DISCONTINUED | OUTPATIENT
Start: 2017-06-16 | End: 2017-06-20

## 2017-06-16 RX ORDER — SODIUM CHLORIDE 9 MG/ML
1000 INJECTION, SOLUTION INTRAVENOUS
Qty: 0 | Refills: 0 | Status: DISCONTINUED | OUTPATIENT
Start: 2017-06-16 | End: 2017-06-16

## 2017-06-16 RX ORDER — INSULIN HUMAN 100 [IU]/ML
6 INJECTION, SOLUTION SUBCUTANEOUS ONCE
Qty: 0 | Refills: 0 | Status: COMPLETED | OUTPATIENT
Start: 2017-06-16 | End: 2017-06-16

## 2017-06-16 RX ORDER — INSULIN LISPRO 100/ML
100 VIAL (ML) SUBCUTANEOUS ONCE
Qty: 0 | Refills: 0 | Status: DISCONTINUED | OUTPATIENT
Start: 2017-06-16 | End: 2017-06-16

## 2017-06-16 RX ORDER — INSULIN HUMAN 100 [IU]/ML
6 INJECTION, SOLUTION SUBCUTANEOUS
Qty: 100 | Refills: 0 | Status: DISCONTINUED | OUTPATIENT
Start: 2017-06-16 | End: 2017-06-17

## 2017-06-16 RX ORDER — SODIUM CHLORIDE 9 MG/ML
500 INJECTION, SOLUTION INTRAVENOUS
Qty: 0 | Refills: 0 | Status: DISCONTINUED | OUTPATIENT
Start: 2017-06-16 | End: 2017-06-16

## 2017-06-16 RX ORDER — SODIUM CHLORIDE 9 MG/ML
250 INJECTION INTRAMUSCULAR; INTRAVENOUS; SUBCUTANEOUS
Qty: 0 | Refills: 0 | Status: DISCONTINUED | OUTPATIENT
Start: 2017-06-16 | End: 2017-06-16

## 2017-06-16 RX ORDER — INSULIN ASPART 100 [IU]/ML
1 INJECTION, SOLUTION SUBCUTANEOUS
Qty: 0 | Refills: 0 | Status: DISCONTINUED | OUTPATIENT
Start: 2017-06-16 | End: 2017-06-16

## 2017-06-16 RX ADMIN — Medication 6 UNIT(S): at 01:47

## 2017-06-16 RX ADMIN — Medication 81 MILLIGRAM(S): at 13:05

## 2017-06-16 RX ADMIN — Medication 100 MILLIGRAM(S): at 21:57

## 2017-06-16 RX ADMIN — Medication 100 MILLIGRAM(S): at 13:08

## 2017-06-16 RX ADMIN — OXYCODONE HYDROCHLORIDE 5 MILLIGRAM(S): 5 TABLET ORAL at 09:15

## 2017-06-16 RX ADMIN — LISINOPRIL 40 MILLIGRAM(S): 2.5 TABLET ORAL at 13:05

## 2017-06-16 RX ADMIN — Medication 1 EACH: at 21:22

## 2017-06-16 RX ADMIN — ATORVASTATIN CALCIUM 20 MILLIGRAM(S): 80 TABLET, FILM COATED ORAL at 21:57

## 2017-06-16 RX ADMIN — OXYCODONE HYDROCHLORIDE 5 MILLIGRAM(S): 5 TABLET ORAL at 18:20

## 2017-06-16 RX ADMIN — INSULIN HUMAN 6 UNIT(S)/HR: 100 INJECTION, SOLUTION SUBCUTANEOUS at 13:11

## 2017-06-16 RX ADMIN — SEVELAMER CARBONATE 2400 MILLIGRAM(S): 2400 POWDER, FOR SUSPENSION ORAL at 13:07

## 2017-06-16 RX ADMIN — Medication 40 MILLIGRAM(S): at 08:47

## 2017-06-16 RX ADMIN — OXYCODONE HYDROCHLORIDE 5 MILLIGRAM(S): 5 TABLET ORAL at 22:30

## 2017-06-16 RX ADMIN — SEVELAMER CARBONATE 2400 MILLIGRAM(S): 2400 POWDER, FOR SUSPENSION ORAL at 17:57

## 2017-06-16 RX ADMIN — DULOXETINE HYDROCHLORIDE 60 MILLIGRAM(S): 30 CAPSULE, DELAYED RELEASE ORAL at 13:06

## 2017-06-16 RX ADMIN — Medication 50 MILLIGRAM(S): at 08:44

## 2017-06-16 RX ADMIN — OXYCODONE HYDROCHLORIDE 5 MILLIGRAM(S): 5 TABLET ORAL at 08:42

## 2017-06-16 RX ADMIN — INSULIN GLARGINE 10 UNIT(S): 100 INJECTION, SOLUTION SUBCUTANEOUS at 01:54

## 2017-06-16 RX ADMIN — INSULIN HUMAN 6 UNIT(S)/HR: 100 INJECTION, SOLUTION SUBCUTANEOUS at 05:27

## 2017-06-16 RX ADMIN — Medication 100 MILLIGRAM(S): at 08:45

## 2017-06-16 RX ADMIN — OXYCODONE HYDROCHLORIDE 5 MILLIGRAM(S): 5 TABLET ORAL at 15:45

## 2017-06-16 RX ADMIN — ONDANSETRON 4 MILLIGRAM(S): 8 TABLET, FILM COATED ORAL at 01:54

## 2017-06-16 RX ADMIN — SEVELAMER CARBONATE 2400 MILLIGRAM(S): 2400 POWDER, FOR SUSPENSION ORAL at 08:48

## 2017-06-16 RX ADMIN — CINACALCET 60 MILLIGRAM(S): 30 TABLET, FILM COATED ORAL at 13:06

## 2017-06-16 RX ADMIN — OXYCODONE HYDROCHLORIDE 5 MILLIGRAM(S): 5 TABLET ORAL at 21:57

## 2017-06-16 RX ADMIN — INSULIN HUMAN 6 UNIT(S): 100 INJECTION, SOLUTION SUBCUTANEOUS at 05:26

## 2017-06-16 RX ADMIN — CLOPIDOGREL BISULFATE 75 MILLIGRAM(S): 75 TABLET, FILM COATED ORAL at 21:57

## 2017-06-16 RX ADMIN — SODIUM CHLORIDE 100 MILLILITER(S): 9 INJECTION, SOLUTION INTRAVENOUS at 17:58

## 2017-06-16 RX ADMIN — SODIUM CHLORIDE 50 MILLILITER(S): 9 INJECTION, SOLUTION INTRAVENOUS at 13:11

## 2017-06-16 NOTE — CONSULT NOTE ADULT - SUBJECTIVE AND OBJECTIVE BOX
Reason For Consult: T1DM Management, Acute DKA    HPI: 60yo p/w shortness of breath and palpitations during dialysis. Pt. had recent left lower extremity angio on Friday. Pt. reports that during dialysis, pt. had chest pain, shortness of breath. Pt. reports some resolution of symptoms. Pain is substernal, no aggravating/alleviating factors, pressure, constant, 5/10. Pt. took 162 mg aspirin Denies nuase/vomiting, diarrhea/constipation, dysuria/hematuria, cough, fevers, sick contacts, recent travel, prolonged stasis. (15 Christian 2017 18:49)    Endocrine History: T1DM diagnosed 40 years ago with neuropathy, retinopathy, ESRD on HD, CAD, CVA, PAD s/p bypass, recently had LLE angioplasty and was discharged to home. Returned to ED with dyspnea during HD and diagnosed with acute DKA. Has been on Medtronic Pump for 3 years and home settings are:    Total Basal -- 10.175 units  12am-0.35  3:30am-0.50  6am-0.425  ICR  12a-9  11a-10  3p-9  ISF  12a-60  7a-40  6p-60  -130    Eats 3 meals with consistent carbs including eggs, pacheco, low salt ham or turkey, tuna fish, chicken, no drinks  Has hypoglycemia unawareness and FS mostly in 150s at home, check 4x/day    Last ophtho 1 year ago.    PAST MEDICAL & SURGICAL HISTORY:  Subclavian vein stenosis, left: s/p stent  Cellulitis: 2015 left foot  DKA, type 1: 1/2015  ACS (acute coronary syndrome): 1/2015 - cath revealed 100% ostial stenosis not amenable to PCI - medical management  MI (myocardial infarction): 3/2015 - s/p cardiac cath - no intervention, medical management  MI, old  TIA (transient ischemic attack): x 2 - 8-9 years ago prior to ASD/VSD repair  PAD (peripheral artery disease)  HLD (hyperlipidemia)  HTN (hypertension)  ESRD (end stage renal disease) on dialysis: T/TH/SAT  Type 1 diabetes mellitus  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  CAD (coronary artery disease): s/p stents  Myocardial infarct  Murmur, cardiac  Gout: past  CVA (cerebral infarction): with no residual, 8 yrs ago, prior to heart surgery - ST memory loss  Peripheral vascular disease: occluded left fem-pop bypass 5/2015  Coronary artery disease  Diabetes mellitus type 1: Insulin Dependent - Medtronic  Minimed Paradigm Insulin Pump - Novolog  ESRD (end stage renal disease): dialysis , tue, thursday, saturday  TIA (transient ischemic attack)  x 3 2005 2 nt to VSD/ASD repair  Diabetes mellitus type II  HTN (hypertension), benign  Hyperlipidemia  Status post device closure of ASD: &quot;clamshell&quot;  History of cardiac catheterization: 1/2015 - no intervention  S/P cholecystectomy  AV fistula: Left UE  S/P femoral-popliteal bypass surgery: L and R in 2013 with graft; 5/2015 CFA angioplasty left and ileofemoral endarterectomywith vein patch angioplasty of left fem-pop bypass graft  Multiple vascular surgery both leg, left fempop bypass revision 11/2015  S/P femoral-popliteal bypass surgery: right  march 2013  Stented coronary artery: Metal plug to artery  AV (arteriovenous fistula): Left AV graft; revision with stent placement 2-3 years ago  S/P cholecystectomy  ASD OR VSD  Repair 2005: mesh      FAMILY HISTORY:  Family history of smoking  Family history of hypertension  Family history of cancer (Sibling)  No pertinent family history in first degree relatives      Social History: Former smoker, 30 pack year history, quit 17 years ago. No alcohol, illicit drug use. Lives at home with spouse    Outpatient Medications: See H&P outpatient medications    MEDICATIONS  (STANDING):  dextrose 5%. 1000milliLiter(s) IV Continuous <Continuous>  dextrose 50% Injectable 12.5Gram(s) IV Push once  dextrose 50% Injectable 25Gram(s) IV Push once  dextrose 50% Injectable 25Gram(s) IV Push once  heparin  Injectable 5000Unit(s) SubCutaneous every 12 hours  aspirin enteric coated 81milliGRAM(s) Oral daily  lisinopril 40milliGRAM(s) Oral daily  DULoxetine 60milliGRAM(s) Oral daily  atorvastatin 20milliGRAM(s) Oral at bedtime  clopidogrel Tablet 75milliGRAM(s) Oral at bedtime  metoprolol succinate ER 50milliGRAM(s) Oral daily  furosemide    Tablet 40milliGRAM(s) Oral <User Schedule>  cinacalcet 60milliGRAM(s) Oral daily  sevelamer hydrochloride 2400milliGRAM(s) Oral three times a day with meals  hydrALAZINE 100milliGRAM(s) Oral every 8 hours  insulin Infusion 6Unit(s)/Hr IV Continuous <Continuous>  dextrose 5% + sodium chloride 0.45%. 1000milliLiter(s) IV Continuous <Continuous>    MEDICATIONS  (PRN):  dextrose Gel 1Dose(s) Oral once PRN Blood Glucose LESS THAN 70 milliGRAM(s)/deciLiter  glucagon  Injectable 1milliGRAM(s) IntraMuscular once PRN Glucose <70 milliGRAM(s)/deciLiter  oxyCODONE IR 5milliGRAM(s) Oral every 6 hours PRN Moderate Pain (4 - 6)      Allergies    No Known Allergies    Intolerances      Review of Systems:  Constitutional: No fever  Eyes: No blurry vision  Neuro: No tremors  HEENT: No pain  Cardiovascular: No chest pain, palpitations  Respiratory: No SOB, no cough  GI: No nausea, vomiting, abdominal pain  : No dysuria  Skin: no rash  Psych: no depression  Endocrine: no polyuria, polydipsia  Hem/lymph: + swelling LLE  Osteoporosis: no fractures    ALL OTHER SYSTEMS REVIEWED AND NEGATIVE    PHYSICAL EXAM:  VITALS: T(C): 37  T(F): 98.6, Max: 99.6 (06-16 @ 04:30)  HR: 78 (72 - 103)  BP: 132/62 (119/57 - 167/71)  RR:  (10 - 23)  SpO2:  (93% - 100%)  GENERAL: NAD, well-groomed, well-developed, thin  EYES: No proptosis, no lid lag, anicteric  HEENT:  Atraumatic, Normocephalic, moist mucous membranes  THYROID: Normal size, no palpable nodules  RESPIRATORY: Clear to auscultation bilaterally; No rales, rhonchi, wheezing, or rubs  CARDIOVASCULAR: Regular rate and rhythm; No murmurs; no peripheral edema  GI: Soft, nontender, non distended, normal bowel sounds  SKIN: Dry, intact, No rashes or lesions  MUSCULOSKELETAL: Full range of motion, normal strength  NEURO: sensation intact, extraocular movements intact, no tremor, normal reflexes  PSYCH: Alert and oriented x 3, normal affect, normal mood  CUSHING'S SIGNS: no striae    CAPILLARY BLOOD GLUCOSE  126 (06-16 @ 17:00)  78 (06-16 @ 16:00)  80 (06-16 @ 15:00)  96 (06-16 @ 14:00)  118 (06-16 @ 13:00)  160 (06-16 @ 12:00)  194 (06-16 @ 11:00)  270 (06-16 @ 10:00)  369 (06-16 @ 09:00)  404 (06-16 @ 08:00)  465 (06-16 @ 07:00)  514 (06-16 @ 06:00)  556 (06-16 @ 05:00)  595 (06-15 @ 21:53)  445 (06-15 @ 15:53)                            8.0    7.7   )-----------( 255      ( 16 Jun 2017 11:35 )             24.0       06-16    140  |  92<L>  |  50<H>  ----------------------------<  78  4.7   |  29  |  6.65<H>    AG 19  EGFR if : 7<L>  EGFR if non : 6<L>    Ca    8.8      06-16  Mg     2.5     06-16  Phos  4.0     06-16    TPro  6.8  /  Alb  4.0  /  TBili  0.3  /  DBili  x   /  AST  33  /  ALT  20  /  AlkPhos  278<H>  06-16    Thyroid Function Tests:      Hemoglobin A1C, Whole Blood: 7.3 % <H> [4.0 - 5.6] (06-16 @ 04:45)  Hemoglobin A1C, Whole Blood: 8.5 % <H> [4.0 - 5.6] (04-11 @ 07:12)          Radiology:

## 2017-06-16 NOTE — CONSULT NOTE ADULT - ASSESSMENT
60yo  PMHX of ACS, CAD, CVA, DM, ESRD, Gout HTN HLD and PVD admitted to the MICU for DKA requiring the removal of insulin pump and the initiation of insulin gtt. last hd yesterday  1- ESRD  2- DKA  3- HTN  4- SHPT  5- PAD    hd am.  consent for hd obtained and in chart  cont with lisinopril 40mg qd  cont with hydralazine 100mg tid  plavix and aspirin to cont  endo consult. insulin pump re-eval  insulin drip 62yo  PMHX of ACS, CAD, CVA, DM, ESRD, Gout HTN HLD and PVD admitted to the MICU for DKA requiring the removal of insulin pump and the initiation of insulin gtt. last hd yesterday  1- ESRD  2- DKA  3- HTN  4- SHPT  5- PAD    hd am.  consent for hd obtained and in chart  cont with lisinopril 40mg qd  cont with hydralazine 100mg tid  plavix and aspirin to cont  endo consult. insulin pump re-eval  insulin drip      addendum: 5:44pm cont with sensipar 60mg qd and renvela with meals

## 2017-06-16 NOTE — PROGRESS NOTE ADULT - PROBLEM SELECTOR PLAN 1
- f/u with Hg A1C   - Check Beta Hydroxy Burate level   - Endocrine f/u   - Discontinue the patients insulin Pump   - Give IVP 6 units of Regular insulin - Start Insuline continuous infusion of 6   -Finger sticks q 1 Hour   - VBG BMP MAg Phos Q4   - Check blood cultures UA   - will Hold off on Antibiotics for now -   -Check Procalcitonine  - trend Lactate  - Maintain NPO

## 2017-06-16 NOTE — PROGRESS NOTE ADULT - ASSESSMENT
Assessment/ Plan: 62yo  PMHX of ACS, CAD, CVA, DM, ESRD, Gout HTN HLD and PVD admitted to the MICU for DKA requiring the removal of insulin pump and the initiation of insulin gtt.

## 2017-06-16 NOTE — PROGRESS NOTE ADULT - ASSESSMENT
62 yo F PMHX of type 1 DM on insulin pump, ACS, CAD, CVA, DM, ESRD, Gout HTN HLD and PVD admitted for c/o cp and palpitations during HD treatment today, found to be in DKA when arriving to floor. Pt. with undetctable reading on FS, STAT serum glucose: 688, Anion Gap:36, large acetone,VBG PH 7.25, pt. currently on insulin pump at this time, pt. symptomatic with c/o cp and SOB and actively vomiting

## 2017-06-16 NOTE — PROGRESS NOTE ADULT - SUBJECTIVE AND OBJECTIVE BOX
CHIEF COMPLAINT: DKA     HPI:    PAST MEDICAL & SURGICAL HISTORY:  Subclavian vein stenosis, left: s/p stent  Cellulitis: 2015 left foot  DKA, type 1: 1/2015  ACS (acute coronary syndrome): 1/2015 - cath revealed 100% ostial stenosis not amenable to PCI - medical management  MI (myocardial infarction): 3/2015 - s/p cardiac cath - no intervention, medical management  MI, old  TIA (transient ischemic attack): x 2 - 8-9 years ago prior to ASD/VSD repair  PAD (peripheral artery disease)  HLD (hyperlipidemia)  HTN (hypertension)  ESRD (end stage renal disease) on dialysis: T/TH/SAT  Type 1 diabetes mellitus  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  CAD (coronary artery disease): s/p stents  Myocardial infarct  Murmur, cardiac  Gout: past  CVA (cerebral infarction): with no residual, 8 yrs ago, prior to heart surgery - ST memory loss  Peripheral vascular disease: occluded left fem-pop bypass 5/2015  Coronary artery disease  Diabetes mellitus type 1: Insulin Dependent - Medtronic  Minimed Paradigm Insulin Pump - Novolog  ESRD (end stage renal disease): dialysis , tue, thursday, saturday  TIA (transient ischemic attack)  x 3 2005 2 nt to VSD/ASD repair  Diabetes mellitus type II  HTN (hypertension), benign  Hyperlipidemia  Status post device closure of ASD: &quot;brenna&quot;  History of cardiac catheterization: 1/2015 - no intervention  S/P cholecystectomy  AV fistula: Left UE  S/P femoral-popliteal bypass surgery: L and R in 2013 with graft; 5/2015 CFA angioplasty left and ileofemoral endarterectomywith vein patch angioplasty of left fem-pop bypass graft  Multiple vascular surgery both leg, left fempop bypass revision 11/2015  S/P femoral-popliteal bypass surgery: right  march 2013  Stented coronary artery: Metal plug to artery  AV (arteriovenous fistula): Left AV graft; revision with stent placement 2-3 years ago  S/P cholecystectomy  ASD OR VSD  Repair 2005: mesh      FAMILY HISTORY:  Family history of smoking  Family history of hypertension  Family history of cancer (Sibling)  No pertinent family history in first degree relatives      SOCIAL HISTORY:  Smoking: [ ] Never Smoked [ ] Former Smoker (__ packs x ___ years) [ ] Current Smoker  (__ packs x ___ years)  Substance Use: [ ] Never Used [ ] Used ____  EtOH Use:  Marital Status: [ ] Single [ ]  [ ]  [ ]   Sexual History:   Occupation:  Recent Travel:  Country of Birth:  Advance Directives:    Allergies    No Known Allergies    Intolerances        HOME MEDICATIONS:    REVIEW OF SYSTEMS:  Constitutional: [ ] fevers [ ] chills [ ] weight loss [ ] weight gain  HEENT: [ ] dry eyes [ ] eye irritation [ ] postnasal drip [ ] nasal congestion  CV: [ ] chest pain [ ] orthopnea [ ] palpitations [ ] murmur  Resp: [ ] cough [ ] shortness of breath [ ] dyspnea [ ] wheezing [ ] sputum [ ] hemoptysis  GI: [ ] nausea [ ] vomiting [ ] diarrhea [ ] constipation [ ] abd pain [ ] dysphagia   : [ ] dysuria [ ] nocturia [ ] hematuria [ ] increased urinary frequency  Musculoskeletal: [ ] back pain [ ] myalgias [ ] arthralgias [ ] fracture  Skin: [ ] rash [ ] itch  Neurological: [ ] headache [ ] dizziness [ ] syncope [ ] weakness [ ] numbness  Psychiatric: [ ] anxiety [ ] depression  Endocrine: [ ] diabetes [ ] thyroid problem  Hematologic/Lymphatic: [ ] anemia [ ] bleeding problem  Allergic/Immunologic: [ ] itchy eyes [ ] nasal discharge [ ] hives [ ] angioedema  [ ] All other systems negative  [ ] Unable to assess ROS because ________    OBJECTIVE:  ICU Vital Signs Last 24 Hrs  T(C): 36.7, Max: 36.8 (06-16 @ 01:41)  T(F): 98.1, Max: 98.2 (06-16 @ 01:41)  HR: 98 (90 - 106)  BP: 147/69 (133/70 - 189/73)  BP(mean): --  ABP: --  ABP(mean): --  RR: 18 (18 - 20)  SpO2: 100% (99% - 100%)        CAPILLARY BLOOD GLUCOSE  595 (15 Christian 2017 21:53)      PHYSICAL EXAM:  General:   HEENT:   Lymph Nodes:  Neck:   Respiratory:   Cardiovascular:   Abdomen:   Extremities:   Skin:   Neurological:  Psychiatry:    LINES:     HOSPITAL MEDICATIONS:  MEDICATIONS  (STANDING):  dextrose 5%. 1000milliLiter(s) IV Continuous <Continuous>  dextrose 50% Injectable 12.5Gram(s) IV Push once  dextrose 50% Injectable 25Gram(s) IV Push once  dextrose 50% Injectable 25Gram(s) IV Push once  insulin aspart (NovoLOG) Pump 1Each SubCutaneous Continuous Pump  heparin  Injectable 5000Unit(s) SubCutaneous every 12 hours  aspirin enteric coated 81milliGRAM(s) Oral daily  lisinopril 40milliGRAM(s) Oral daily  DULoxetine 60milliGRAM(s) Oral daily  atorvastatin 20milliGRAM(s) Oral at bedtime  clopidogrel Tablet 75milliGRAM(s) Oral at bedtime  metoprolol succinate ER 50milliGRAM(s) Oral daily  furosemide    Tablet 40milliGRAM(s) Oral <User Schedule>  cinacalcet 60milliGRAM(s) Oral daily  sevelamer hydrochloride 2400milliGRAM(s) Oral three times a day with meals  hydrALAZINE 100milliGRAM(s) Oral every 8 hours    MEDICATIONS  (PRN):  dextrose Gel 1Dose(s) Oral once PRN Blood Glucose LESS THAN 70 milliGRAM(s)/deciLiter  glucagon  Injectable 1milliGRAM(s) IntraMuscular once PRN Glucose <70 milliGRAM(s)/deciLiter  oxyCODONE  5 mG/acetaminophen 325 mG 1Tablet(s) Oral every 6 hours PRN Severe Pain (7 - 10)      LABS:                        9.5    8.7   )-----------( 234      ( 15 Christian 2017 16:41 )             29.6     Hgb Trend: 9.5<--, 8.2<--, 9.1<--  06-16    135  |  82<L>  |  36<H>  ----------------------------<  688<HH>  5.7<H>   |  17<L>  |  5.30<H>    Ca    9.5      16 Jun 2017 00:11    TPro  7.7  /  Alb  4.2  /  TBili  0.4  /  DBili  x   /  AST  29  /  ALT  15  /  AlkPhos  312<H>  06-15    Creatinine Trend: 5.30<--, 4.46<--, 6.60<--, 4.25<--, 4.81<--, 8.18<--  PT/INR - ( 15 Christian 2017 17:48 )   PT: 11.9 sec;   INR: 1.10 ratio         PTT - ( 15 Christian 2017 17:48 )  PTT:31.3 sec      Venous Blood Gas:  06-16 @ 00:18  7.25/45/29/19/38  VBG Lactate: 3.4  Venous Blood Gas:  06-15 @ 16:59  7.36/46/32/26/50  VBG Lactate: 1.7      MICROBIOLOGY:     RADIOLOGY:  [ ] Reviewed and interpreted by me    EKG:  Kayley Barr [facility]- [facility] ex [id number] CHIEF COMPLAINT: DKA     HPI:     PAST MEDICAL & SURGICAL HISTORY:  Subclavian vein stenosis, left: s/p stent  Cellulitis: 2015 left foot  DKA, type 1: 1/2015  ACS (acute coronary syndrome): 1/2015 - cath revealed 100% ostial stenosis not amenable to PCI - medical management  MI (myocardial infarction): 3/2015 - s/p cardiac cath - no intervention, medical management  MI, old  TIA (transient ischemic attack): x 2 - 8-9 years ago prior to ASD/VSD repair  PAD (peripheral artery disease)  HLD (hyperlipidemia)  HTN (hypertension)  ESRD (end stage renal disease) on dialysis: T/TH/SAT  Type 1 diabetes mellitus  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  CAD (coronary artery disease): s/p stents  Myocardial infarct  Murmur, cardiac  Gout: past  CVA (cerebral infarction): with no residual, 8 yrs ago, prior to heart surgery - ST memory loss  Peripheral vascular disease: occluded left fem-pop bypass 5/2015  Coronary artery disease  Diabetes mellitus type 1: Insulin Dependent - Medtronic  Minimed Paradigm Insulin Pump - Novolog  ESRD (end stage renal disease): dialysis , tue, thursday, saturday  TIA (transient ischemic attack)  x 3 2005 2 nt to VSD/ASD repair  Diabetes mellitus type II  HTN (hypertension), benign  Hyperlipidemia  Status post device closure of ASD: &quot;brenna&quot;  History of cardiac catheterization: 1/2015 - no intervention  S/P cholecystectomy  AV fistula: Left UE  S/P femoral-popliteal bypass surgery: L and R in 2013 with graft; 5/2015 CFA angioplasty left and ileofemoral endarterectomywith vein patch angioplasty of left fem-pop bypass graft  Multiple vascular surgery both leg, left fempop bypass revision 11/2015  S/P femoral-popliteal bypass surgery: right  march 2013  Stented coronary artery: Metal plug to artery  AV (arteriovenous fistula): Left AV graft; revision with stent placement 2-3 years ago  S/P cholecystectomy  ASD OR VSD  Repair 2005: mesh      FAMILY HISTORY:  Family history of smoking  Family history of hypertension  Family history of cancer (Sibling)  No pertinent family history in first degree relatives      SOCIAL HISTORY:  Smoking: [ ] Never Smoked [ ] Former Smoker (__ packs x ___ years) [ ] Current Smoker  (__ packs x ___ years)  Substance Use: [ ] Never Used [ ] Used ____  EtOH Use:  Marital Status: [ ] Single [ ]  [ ]  [ ]   Sexual History:   Occupation:  Recent Travel:  Country of Birth:  Advance Directives:    Allergies    No Known Allergies    Intolerances        HOME MEDICATIONS:    REVIEW OF SYSTEMS:  Constitutional: [ ] fevers [ ] chills [ ] weight loss [ ] weight gain  HEENT: [ ] dry eyes [ ] eye irritation [ ] postnasal drip [ ] nasal congestion  CV: [ ] chest pain [ ] orthopnea [ ] palpitations [ ] murmur  Resp: [ ] cough [ ] shortness of breath [ ] dyspnea [ ] wheezing [ ] sputum [ ] hemoptysis  GI: [ ] nausea [ ] vomiting [ ] diarrhea [ ] constipation [ ] abd pain [ ] dysphagia   : [ ] dysuria [ ] nocturia [ ] hematuria [ ] increased urinary frequency  Musculoskeletal: [ ] back pain [ ] myalgias [ ] arthralgias [ ] fracture  Skin: [ ] rash [ ] itch  Neurological: [ ] headache [ ] dizziness [ ] syncope [ ] weakness [ ] numbness  Psychiatric: [ ] anxiety [ ] depression  Endocrine: [ ] diabetes [ ] thyroid problem  Hematologic/Lymphatic: [ ] anemia [ ] bleeding problem  Allergic/Immunologic: [ ] itchy eyes [ ] nasal discharge [ ] hives [ ] angioedema  [ ] All other systems negative  [ ] Unable to assess ROS because ________    OBJECTIVE:  ICU Vital Signs Last 24 Hrs  T(C): 36.7, Max: 36.8 (06-16 @ 01:41)  T(F): 98.1, Max: 98.2 (06-16 @ 01:41)  HR: 98 (90 - 106)  BP: 147/69 (133/70 - 189/73)  BP(mean): --  ABP: --  ABP(mean): --  RR: 18 (18 - 20)  SpO2: 100% (99% - 100%)        CAPILLARY BLOOD GLUCOSE  595 (15 Christian 2017 21:53)      PHYSICAL EXAM:  General:   HEENT:   Lymph Nodes:  Neck:   Respiratory:   Cardiovascular:   Abdomen:   Extremities:   Skin:   Neurological:  Psychiatry:    LINES:     HOSPITAL MEDICATIONS:  MEDICATIONS  (STANDING):  dextrose 5%. 1000milliLiter(s) IV Continuous <Continuous>  dextrose 50% Injectable 12.5Gram(s) IV Push once  dextrose 50% Injectable 25Gram(s) IV Push once  dextrose 50% Injectable 25Gram(s) IV Push once  insulin aspart (NovoLOG) Pump 1Each SubCutaneous Continuous Pump  heparin  Injectable 5000Unit(s) SubCutaneous every 12 hours  aspirin enteric coated 81milliGRAM(s) Oral daily  lisinopril 40milliGRAM(s) Oral daily  DULoxetine 60milliGRAM(s) Oral daily  atorvastatin 20milliGRAM(s) Oral at bedtime  clopidogrel Tablet 75milliGRAM(s) Oral at bedtime  metoprolol succinate ER 50milliGRAM(s) Oral daily  furosemide    Tablet 40milliGRAM(s) Oral <User Schedule>  cinacalcet 60milliGRAM(s) Oral daily  sevelamer hydrochloride 2400milliGRAM(s) Oral three times a day with meals  hydrALAZINE 100milliGRAM(s) Oral every 8 hours    MEDICATIONS  (PRN):  dextrose Gel 1Dose(s) Oral once PRN Blood Glucose LESS THAN 70 milliGRAM(s)/deciLiter  glucagon  Injectable 1milliGRAM(s) IntraMuscular once PRN Glucose <70 milliGRAM(s)/deciLiter  oxyCODONE  5 mG/acetaminophen 325 mG 1Tablet(s) Oral every 6 hours PRN Severe Pain (7 - 10)      LABS:                        9.5    8.7   )-----------( 234      ( 15 Christian 2017 16:41 )             29.6     Hgb Trend: 9.5<--, 8.2<--, 9.1<--  06-16    135  |  82<L>  |  36<H>  ----------------------------<  688<HH>  5.7<H>   |  17<L>  |  5.30<H>    Ca    9.5      16 Jun 2017 00:11    TPro  7.7  /  Alb  4.2  /  TBili  0.4  /  DBili  x   /  AST  29  /  ALT  15  /  AlkPhos  312<H>  06-15    Creatinine Trend: 5.30<--, 4.46<--, 6.60<--, 4.25<--, 4.81<--, 8.18<--  PT/INR - ( 15 Christian 2017 17:48 )   PT: 11.9 sec;   INR: 1.10 ratio         PTT - ( 15 Christain 2017 17:48 )  PTT:31.3 sec      Venous Blood Gas:  06-16 @ 00:18  7.25/45/29/19/38  VBG Lactate: 3.4  Venous Blood Gas:  06-15 @ 16:59  7.36/46/32/26/50  VBG Lactate: 1.7      MICROBIOLOGY:     RADIOLOGY:  [ ] Reviewed and interpreted by me    EKG:  Kayley Barr [facility]- [facility] ex [id number] CHIEF COMPLAINT: DKA     HPI 62yo  PMHX of ACS, CAD, CVA, DM, ESRD, Gout HTN HLD and PVD originally admitted with  shortness of breath and palpitations during dialysis Today the patient was found to have a a serum glucose of 504 with an AG of 25 a repeat BMP demonstrated a a serum glucose of 688 and an AG of 36 with large acetone. Venous PH 7.25 The patient currently recieves insulin via pump     PAST MEDICAL & SURGICAL HISTORY:  Subclavian vein stenosis, left: s/p stent  Cellulitis: 2015 left foot  DKA, type 1: 1/2015  ACS (acute coronary syndrome): 1/2015 - cath revealed 100% ostial stenosis not amenable to PCI - medical management  MI (myocardial infarction): 3/2015 - s/p cardiac cath - no intervention, medical management  MI, old  TIA (transient ischemic attack): x 2 - 8-9 years ago prior to ASD/VSD repair  PAD (peripheral artery disease)  HLD (hyperlipidemia)  HTN (hypertension)  ESRD (end stage renal disease) on dialysis: T/TH/SAT  Type 1 diabetes mellitus  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  CAD (coronary artery disease): s/p stents  Myocardial infarct  Murmur, cardiac  Gout: past  CVA (cerebral infarction): with no residual, 8 yrs ago, prior to heart surgery - ST memory loss  Peripheral vascular disease: occluded left fem-pop bypass 5/2015  Coronary artery disease  Diabetes mellitus type 1: Insulin Dependent - Medtr[employer]  Minimed Paradigm Insulin Pump - Novolog  ESRD (end stage renal disease): dialysis , tue, thursday, saturday  TIA (transient ischemic attack)  x 3 2005 2 nt to VSD/ASD repair  Diabetes mellitus type II  HTN (hypertension), benign  Hyperlipidemia  Status post device closure of ASD: &quot;clamshell&quot;  History of cardiac catheterization: 1/2015 - no intervention  S/P cholecystectomy  AV fistula: Left UE  S/P femoral-popliteal bypass surgery: L and R in 2013 with graft; 5/2015 CFA angioplasty left and ileofemoral endarterectomywith vein patch angioplasty of left fem-pop bypass graft  Multiple vascular surgery both leg, left fempop bypass revision 11/2015  S/P femoral-popliteal bypass surgery: right  march 2013  Stented coronary artery: Metal plug to artery  AV (arteriovenous fistula): Left AV graft; revision with stent placement 2-3 years ago  S/P cholecystectomy  ASD OR VSD  Repair 2005: mesh      FAMILY HISTORY:  Family history of smoking  Family history of hypertension  Family history of cancer (Sibling)  No pertinent family history in first degree relatives      SOCIAL HISTORY:  Smoking: [ ] Never Smoked [ ] Former Smoker (__ packs x ___ years) [ ] Current Smoker  (__ packs x ___ years)  Substance Use: [ ] Never Used [ ] Used ____  EtOH Use:  Marital Status: [ ] Single [ ]  [ ]  [ ]   Sexual History:   Occupation:  Recent Travel:  Country of Birth:  Advance Directives:    Allergies    No Known Allergies    Intolerances        HOME MEDICATIONS:    REVIEW OF SYSTEMS:  Constitutional: [ ] fevers [ ] chills [ ] weight loss [ ] weight gain  HEENT: [ ] dry eyes [ ] eye irritation [ ] postnasal drip [ ] nasal congestion  CV: [ ] chest pain [ ] orthopnea [ ] palpitations [ ] murmur  Resp: [ ] cough [ ] shortness of breath [ ] dyspnea [ ] wheezing [ ] sputum [ ] hemoptysis  GI: [ ] nausea [ ] vomiting [ ] diarrhea [ ] constipation [ ] abd pain [ ] dysphagia   : [ ] dysuria [ ] nocturia [ ] hematuria [ ] increased urinary frequency  Musculoskeletal: [ ] back pain [ ] myalgias [ ] arthralgias [ ] fracture  Skin: [ ] rash [ ] itch  Neurological: [ ] headache [ ] dizziness [ ] syncope [ ] weakness [ ] numbness  Psychiatric: [ ] anxiety [ ] depression  Endocrine: [ ] diabetes [ ] thyroid problem  Hematologic/Lymphatic: [ ] anemia [ ] bleeding problem  Allergic/Immunologic: [ ] itchy eyes [ ] nasal discharge [ ] hives [ ] angioedema  [ ] All other systems negative  [ ] Unable to assess ROS because ________    OBJECTIVE:  ICU Vital Signs Last 24 Hrs  T(C): 36.7, Max: 36.8 (06-16 @ 01:41)  T(F): 98.1, Max: 98.2 (06-16 @ 01:41)  HR: 98 (90 - 106)  BP: 147/69 (133/70 - 189/73)  BP(mean): --  ABP: --  ABP(mean): --  RR: 18 (18 - 20)  SpO2: 100% (99% - 100%)        CAPILLARY BLOOD GLUCOSE  595 (15 Christian 2017 21:53)      PHYSICAL EXAM:  General:   HEENT:   Lymph Nodes:  Neck:   Respiratory:   Cardiovascular:   Abdomen:   Extremities:   Skin:   Neurological:  Psychiatry:    LINES:     HOSPITAL MEDICATIONS:  MEDICATIONS  (STANDING):  dextrose 5%. 1000milliLiter(s) IV Continuous <Continuous>  dextrose 50% Injectable 12.5Gram(s) IV Push once  dextrose 50% Injectable 25Gram(s) IV Push once  dextrose 50% Injectable 25Gram(s) IV Push once  insulin aspart (NovoLOG) Pump 1Each SubCutaneous Continuous Pump  heparin  Injectable 5000Unit(s) SubCutaneous every 12 hours  aspirin enteric coated 81milliGRAM(s) Oral daily  lisinopril 40milliGRAM(s) Oral daily  DULoxetine 60milliGRAM(s) Oral daily  atorvastatin 20milliGRAM(s) Oral at bedtime  clopidogrel Tablet 75milliGRAM(s) Oral at bedtime  metoprolol succinate ER 50milliGRAM(s) Oral daily  furosemide    Tablet 40milliGRAM(s) Oral <User Schedule>  cinacalcet 60milliGRAM(s) Oral daily  sevelamer hydrochloride 2400milliGRAM(s) Oral three times a day with meals  hydrALAZINE 100milliGRAM(s) Oral every 8 hours    MEDICATIONS  (PRN):  dextrose Gel 1Dose(s) Oral once PRN Blood Glucose LESS THAN 70 milliGRAM(s)/deciLiter  glucagon  Injectable 1milliGRAM(s) IntraMuscular once PRN Glucose <70 milliGRAM(s)/deciLiter  oxyCODONE  5 mG/acetaminophen 325 mG 1Tablet(s) Oral every 6 hours PRN Severe Pain (7 - 10)      LABS:                        9.5    8.7   )-----------( 234      ( 15 Christian 2017 16:41 )             29.6     Hgb Trend: 9.5<--, 8.2<--, 9.1<--  06-16    135  |  82<L>  |  36<H>  ----------------------------<  688<HH>  5.7<H>   |  17<L>  |  5.30<H>    Ca    9.5      16 Jun 2017 00:11    TPro  7.7  /  Alb  4.2  /  TBili  0.4  /  DBili  x   /  AST  29  /  ALT  15  /  AlkPhos  312<H>  06-15    Creatinine Trend: 5.30<--, 4.46<--, 6.60<--, 4.25<--, 4.81<--, 8.18<--  PT/INR - ( 15 Christian 2017 17:48 )   PT: 11.9 sec;   INR: 1.10 ratio         PTT - ( 15 Christian 2017 17:48 )  PTT:31.3 sec      Venous Blood Gas:  06-16 @ 00:18  7.25/45/29/19/38  VBG Lactate: 3.4  Venous Blood Gas:  06-15 @ 16:59  7.36/46/32/26/50  VBG Lactate: 1.7      MICROBIOLOGY:     RADIOLOGY:  [ ] Reviewed and interpreted by me    EKG:  Kayley Barr [facility]- [facility] ex [id number] CHIEF COMPLAINT: DKA     HPI 60yo  PMHX of ACS, CAD, CVA, DM, ESRD, Gout HTN HLD and PVD originally admitted with  shortness of breath and palpitations during dialysis Today the patient was found to have a a serum glucose of 504 with an AG of 25 a repeat BMP demonstrated a a serum glucose of 688 and an AG of 36 with large acetone. Venous PH 7.25 The patient currently recieves insulin via pump. While on the floor the patient received 10 Units of Lantus with 6 units of lispro SC. The Patient will be transferred to the MICU for further management with insulin gtt.      PAST MEDICAL & SURGICAL HISTORY:  Subclavian vein stenosis, left: s/p stent  Cellulitis: 2015 left foot  DKA, type 1: 1/2015  ACS (acute coronary syndrome): 1/2015 - cath revealed 100% ostial stenosis not amenable to PCI - medical management  MI (myocardial infarction): 3/2015 - s/p cardiac cath - no intervention, medical management  MI, old  TIA (transient ischemic attack): x 2 - 8-9 years ago prior to ASD/VSD repair  PAD (peripheral artery disease)  HLD (hyperlipidemia)  HTN (hypertension)  ESRD (end stage renal disease) on dialysis: T/TH/SAT  Type 1 diabetes mellitus  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  CAD (coronary artery disease): s/p stents  Myocardial infarct  Murmur, cardiac  Gout: past  CVA (cerebral infarction): with no residual, 8 yrs ago, prior to heart surgery - ST memory loss  Peripheral vascular disease: occluded left fem-pop bypass 5/2015  Coronary artery disease  Diabetes mellitus type 1: Insulin Dependent - Medtronic  Minimed Paradigm Insulin Pump - Novolog  ESRD (end stage renal disease): dialysis , tue, thursday, saturday  TIA (transient ischemic attack)  x 3 2005 2 nt to VSD/ASD repair  Diabetes mellitus type II  HTN (hypertension), benign  Hyperlipidemia  Status post device closure of ASD: &quot;brenna&quot;  History of cardiac catheterization: 1/2015 - no intervention  S/P cholecystectomy  AV fistula: Left UE  S/P femoral-popliteal bypass surgery: L and R in 2013 with graft; 5/2015 CFA angioplasty left and ileofemoral endarterectomywith vein patch angioplasty of left fem-pop bypass graft  Multiple vascular surgery both leg, left fempop bypass revision 11/2015  S/P femoral-popliteal bypass surgery: right  march 2013  Stented coronary artery: Metal plug to artery  AV (arteriovenous fistula): Left AV graft; revision with stent placement 2-3 years ago  S/P cholecystectomy  ASD OR VSD  Repair 2005: mesh      FAMILY HISTORY:  Family history of smoking  Family history of hypertension  Family history of cancer (Sibling)  No pertinent family history in first degree relatives      SOCIAL HISTORY:  Smoking: [ ] Never Smoked [ ] Former Smoker (__ packs x ___ years) [ ] Current Smoker  (__ packs x ___ years)  Substance Use: [ ] Never Used [ ] Used ____  EtOH Use:  Marital Status: [ ] Single [ ]  [ ]  [ ]   Sexual History:   Occupation:  Recent Travel:  Country of Birth:  Advance Directives:    Allergies    No Known Allergies    Intolerances        HOME MEDICATIONS:    REVIEW OF SYSTEMS:  Constitutional: [ ] fevers [ ] chills [ ] weight loss [ ] weight gain  HEENT: [ ] dry eyes [ ] eye irritation [ ] postnasal drip [ ] nasal congestion  CV: [ ] chest pain [ ] orthopnea [ ] palpitations [ ] murmur  Resp: [ ] cough [ ] shortness of breath [ ] dyspnea [ ] wheezing [ ] sputum [ ] hemoptysis  GI: [ ] nausea [ ] vomiting [ ] diarrhea [ ] constipation [ ] abd pain [ ] dysphagia   : [ ] dysuria [ ] nocturia [ ] hematuria [ ] increased urinary frequency  Musculoskeletal: [ ] back pain [ ] myalgias [ ] arthralgias [ ] fracture  Skin: [ ] rash [ ] itch  Neurological: [ ] headache [ ] dizziness [ ] syncope [ ] weakness [ ] numbness  Psychiatric: [ ] anxiety [ ] depression  Endocrine: [ ] diabetes [ ] thyroid problem  Hematologic/Lymphatic: [ ] anemia [ ] bleeding problem  Allergic/Immunologic: [ ] itchy eyes [ ] nasal discharge [ ] hives [ ] angioedema  [ ] All other systems negative  [ ] Unable to assess ROS because ________    OBJECTIVE:  ICU Vital Signs Last 24 Hrs  T(C): 36.7, Max: 36.8 (06-16 @ 01:41)  T(F): 98.1, Max: 98.2 (06-16 @ 01:41)  HR: 98 (90 - 106)  BP: 147/69 (133/70 - 189/73)  BP(mean): --  ABP: --  ABP(mean): --  RR: 18 (18 - 20)  SpO2: 100% (99% - 100%)        CAPILLARY BLOOD GLUCOSE  595 (15 Christian 2017 21:53)      PHYSICAL EXAM:  General:   HEENT:   Lymph Nodes:  Neck:   Respiratory:   Cardiovascular:   Abdomen:   Extremities:   Skin:   Neurological:  Psychiatry:    LINES:     HOSPITAL MEDICATIONS:  MEDICATIONS  (STANDING):  dextrose 5%. 1000milliLiter(s) IV Continuous <Continuous>  dextrose 50% Injectable 12.5Gram(s) IV Push once  dextrose 50% Injectable 25Gram(s) IV Push once  dextrose 50% Injectable 25Gram(s) IV Push once  insulin aspart (NovoLOG) Pump 1Each SubCutaneous Continuous Pump  heparin  Injectable 5000Unit(s) SubCutaneous every 12 hours  aspirin enteric coated 81milliGRAM(s) Oral daily  lisinopril 40milliGRAM(s) Oral daily  DULoxetine 60milliGRAM(s) Oral daily  atorvastatin 20milliGRAM(s) Oral at bedtime  clopidogrel Tablet 75milliGRAM(s) Oral at bedtime  metoprolol succinate ER 50milliGRAM(s) Oral daily  furosemide    Tablet 40milliGRAM(s) Oral <User Schedule>  cinacalcet 60milliGRAM(s) Oral daily  sevelamer hydrochloride 2400milliGRAM(s) Oral three times a day with meals  hydrALAZINE 100milliGRAM(s) Oral every 8 hours    MEDICATIONS  (PRN):  dextrose Gel 1Dose(s) Oral once PRN Blood Glucose LESS THAN 70 milliGRAM(s)/deciLiter  glucagon  Injectable 1milliGRAM(s) IntraMuscular once PRN Glucose <70 milliGRAM(s)/deciLiter  oxyCODONE  5 mG/acetaminophen 325 mG 1Tablet(s) Oral every 6 hours PRN Severe Pain (7 - 10)      LABS:                        9.5    8.7   )-----------( 234      ( 15 Christian 2017 16:41 )             29.6     Hgb Trend: 9.5<--, 8.2<--, 9.1<--  06-16    135  |  82<L>  |  36<H>  ----------------------------<  688<HH>  5.7<H>   |  17<L>  |  5.30<H>    Ca    9.5      16 Jun 2017 00:11    TPro  7.7  /  Alb  4.2  /  TBili  0.4  /  DBili  x   /  AST  29  /  ALT  15  /  AlkPhos  312<H>  06-15    Creatinine Trend: 5.30<--, 4.46<--, 6.60<--, 4.25<--, 4.81<--, 8.18<--  PT/INR - ( 15 Christian 2017 17:48 )   PT: 11.9 sec;   INR: 1.10 ratio         PTT - ( 15 Christian 2017 17:48 )  PTT:31.3 sec      Venous Blood Gas:  06-16 @ 00:18  7.25/45/29/19/38  VBG Lactate: 3.4  Venous Blood Gas:  06-15 @ 16:59  7.36/46/32/26/50  VBG Lactate: 1.7      MICROBIOLOGY:     RADIOLOGY:  [ ] Reviewed and interpreted by me    EKG:  Kayley Barr [facility]- BC ex [id number] CHIEF COMPLAINT: DKA     HPI 60yo  PMHX of ACS, CAD, CVA, DM, ESRD, Gout HTN HLD and PVD originally admitted with  shortness of breath and palpitations during dialysis Today the patient was found to have a a serum glucose of 504 with an AG of 25 a repeat BMP demonstrated a a serum glucose of 688 and an AG of 36 with large acetone. Venous PH 7.25 The patient currently recieves insulin via pump. While on the floor the patient received 10 Units of Lantus with 6 units of lispro SC. The Patient will be transferred to the MICU for further management with insulin gtt.      PAST MEDICAL & SURGICAL HISTORY:  Subclavian vein stenosis, left: s/p stent   Cellulitis: 2015 left foot  DKA, type 1: 1/2015  ACS (acute coronary syndrome): 1/2015 - cath revealed 100% ostial stenosis not amenable to PCI - medical management  MI (myocardial infarction): 3/2015 - s/p cardiac cath - no intervention, medical management  MI, old  TIA (transient ischemic attack): x 2 - 8-9 years ago prior to ASD/VSD repair  PAD (peripheral artery disease)  HLD (hyperlipidemia)  HTN (hypertension)  ESRD (end stage renal disease) on dialysis: T/TH/SAT  Type 1 diabetes mellitus  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  CAD (coronary artery disease): s/p stents  Myocardial infarct  Murmur, cardiac  Gout: past  CVA (cerebral infarction): with no residual, 8 yrs ago, prior to heart surgery - ST memory loss  Peripheral vascular disease: occluded left fem-pop bypass 5/2015  Coronary artery disease  Diabetes mellitus type 1: Insulin Dependent - Medtronic  Minimed Paradigm Insulin Pump - Novolog  ESRD (end stage renal disease): dialysis , tue, thursday, saturday  TIA (transient ischemic attack)  x 3 2005 2 nt to VSD/ASD repair  Diabetes mellitus type II  HTN (hypertension), benign  Hyperlipidemia  Status post device closure of ASD: &quot;[place]&quot;  History of cardiac catheterization: 1/2015 - no intervention  S/P cholecystectomy  AV fistula: Left UE  S/P femoral-popliteal bypass surgery: L and R in 2013 with graft; 5/2015 CFA angioplasty left and ileofemoral endarterectomywith vein patch angioplasty of left fem-pop bypass graft  Multiple vascular surgery both leg, left fempop bypass revision 11/2015  S/P femoral-popliteal bypass surgery: right  march 2013  Stented coronary artery: Metal plug to artery  AV (arteriovenous fistula): Left AV graft; revision with stent placement 2-3 years ago  S/P cholecystectomy  ASD OR VSD  Repair 2005: mesh      FAMILY HISTORY:  Family history of smoking quit 17 years ago  Family history of hypertension  Family history of cancer (Sibling)  No pertinent family history in first degree relatives      SOCIAL HISTORY:  Smoking: [ ] Never Smoked [x ] Former Smoker (__1 packs x __17_ years) [ ] Current Smoker  (__ packs x ___ years)  Substance Use: [ ] Never Used [ ] Used __"when I was Younger"__  EtOH Use: " when I was Younger"  Marital Status: [ ] Single [x ]  [ ]  [ ]   Sexual History:   Occupation: Retired   Recent Travel: No   Country of Birth:  Advance Directives:    Allergies    No Known Allergies    Intolerances        HOME MEDICATIONS:    REVIEW OF SYSTEMS:  Constitutional: [ ] fevers [ ] chills [ ] weight loss [ ] weight gain Negative   HEENT: [X ] dry eyes [ ] eye irritation [ ] postnasal drip [ ] nasal congestion  CV: [ ] chest pain [ ] orthopnea [ ] palpitations [ ] murmur Denies   Resp: [ ] cough [x ] shortness of breath [ ] dyspnea [ ] wheezing [ ] sputum [ ] hemoptysis  GI: [ ] nausea [ ] vomiting [ ] diarrhea [ ] constipation [ ] abd pain [ ] dysphagia Denies    : [ ] dysuria [ ] nocturia [ ] hematuria [ ] increased urinary frequency Pt staes makes little urine   Musculoskeletal: [x ] back pain Chronic [ ] myalgias [ ] arthralgias [ ] fracture   Skin: [ ] rash [ ] itch Negative  Neurological: [ ] headache [ ] dizziness [ ] syncope [ ] weakness [ ] numbness Denies   Psychiatric: [ ] anxiety [ ] depression Denies   Endocrine: [x ] diabetes [ ] thyroid problem  Hematologic/Lymphatic: [ ] anemia [ ] bleeding problem Denies   Allergic/Immunologic: [ ] itchy eyes [ ] nasal discharge [ ] hives [ ] angioedema Denies   [x ] All other systems negative  [ ] Unable to assess ROS because ________    OBJECTIVE:  ICU Vital Signs Last 24 Hrs  T(C): 36.7, Max: 36.8 (06-16 @ 01:41)  T(F): 98.1, Max: 98.2 (06-16 @ 01:41)  HR: 98 (90 - 106)  BP: 147/69 (133/70 - 189/73)  BP(mean): --  ABP: --  ABP(mean): --  RR: 18 (18 - 20)  SpO2: 100% (99% - 100%)        CAPILLARY BLOOD GLUCOSE  595 (15 Christian 2017 21:53)      PHYSICAL EXAM:  General:  No acute distress   HEENT: PERRLA   Lymph Nodes: No lymph adenopathy to cervical chain   Neck: Supple   Respiratory: Symetrical non labored Tachypnea with Activity    Cardiovascular: S1 S2 no M/C/G/ R (+) 2 edema left lower extremity - Pt states this is chronic   Abdomen: Soft non tender (+)   Extremities: 4/5 muscle strength bilateral Upper extremities - 3/5 to lower extremities   Skin: Intact   Neurological: Alert - states loss of long term memory   Psychiatry:    LINES:     HOSPITAL MEDICATIONS:  MEDICATIONS  (STANDING):  dextrose 5%. 1000milliLiter(s) IV Continuous <Continuous>  dextrose 50% Injectable 12.5Gram(s) IV Push once  dextrose 50% Injectable 25Gram(s) IV Push once  dextrose 50% Injectable 25Gram(s) IV Push once  insulin aspart (NovoLOG) Pump 1Each SubCutaneous Continuous Pump  heparin  Injectable 5000Unit(s) SubCutaneous every 12 hours  aspirin enteric coated 81milliGRAM(s) Oral daily  lisinopril 40milliGRAM(s) Oral daily  DULoxetine 60milliGRAM(s) Oral daily  atorvastatin 20milliGRAM(s) Oral at bedtime  clopidogrel Tablet 75milliGRAM(s) Oral at bedtime  metoprolol succinate ER 50milliGRAM(s) Oral daily  furosemide    Tablet 40milliGRAM(s) Oral <User Schedule>  cinacalcet 60milliGRAM(s) Oral daily  sevelamer hydrochloride 2400milliGRAM(s) Oral three times a day with meals  hydrALAZINE 100milliGRAM(s) Oral every 8 hours    MEDICATIONS  (PRN):  dextrose Gel 1Dose(s) Oral once PRN Blood Glucose LESS THAN 70 milliGRAM(s)/deciLiter  glucagon  Injectable 1milliGRAM(s) IntraMuscular once PRN Glucose <70 milliGRAM(s)/deciLiter  oxyCODONE  5 mG/acetaminophen 325 mG 1Tablet(s) Oral every 6 hours PRN Severe Pain (7 - 10)      LABS:                        9.5    8.7   )-----------( 234      ( 15 Christian 2017 16:41 )             29.6     Hgb Trend: 9.5<--, 8.2<--, 9.1<--  06-16    135  |  82<L>  |  36<H>  ----------------------------<  688<HH>  5.7<H>   |  17<L>  |  5.30<H>    Ca    9.5      16 Jun 2017 00:11    TPro  7.7  /  Alb  4.2  /  TBili  0.4  /  DBili  x   /  AST  29  /  ALT  15  /  AlkPhos  312<H>  06-15    Creatinine Trend: 5.30<--, 4.46<--, 6.60<--, 4.25<--, 4.81<--, 8.18<--  PT/INR - ( 15 Christian 2017 17:48 )   PT: 11.9 sec;   INR: 1.10 ratio         PTT - ( 15 Christian 2017 17:48 )  PTT:31.3 sec      Venous Blood Gas:  06-16 @ 00:18  7.25/45/29/19/38  VBG Lactate: 3.4  Venous Blood Gas:  06-15 @ 16:59  7.36/46/32/26/50  VBG Lactate: 1.7      MICROBIOLOGY:  (P)     RADIOLOGY:  [ ] Reviewed and interpreted by me    EKG:            Kayley Barr [facility]- [facility] ex [id number]

## 2017-06-16 NOTE — PROGRESS NOTE ADULT - SUBJECTIVE AND OBJECTIVE BOX
Patient is a 61y old  Female who presents with a chief complaint of pain  and swelling in left leg (15 Christian 2017 20:50)      SUBJECTIVE / OVERNIGHT EVENTS: Events noted. Tired, in ICU. c/o left leg pain  Review of Systems  chest pain no  palpitations no  sob no  nausea no  headache no    MEDICATIONS  (STANDING):  dextrose 5%. 1000milliLiter(s) IV Continuous <Continuous>  dextrose 50% Injectable 12.5Gram(s) IV Push once  dextrose 50% Injectable 25Gram(s) IV Push once  dextrose 50% Injectable 25Gram(s) IV Push once  heparin  Injectable 5000Unit(s) SubCutaneous every 12 hours  aspirin enteric coated 81milliGRAM(s) Oral daily  lisinopril 40milliGRAM(s) Oral daily  DULoxetine 60milliGRAM(s) Oral daily  atorvastatin 20milliGRAM(s) Oral at bedtime  clopidogrel Tablet 75milliGRAM(s) Oral at bedtime  metoprolol succinate ER 50milliGRAM(s) Oral daily  furosemide    Tablet 40milliGRAM(s) Oral <User Schedule>  cinacalcet 60milliGRAM(s) Oral daily  sevelamer hydrochloride 2400milliGRAM(s) Oral three times a day with meals  hydrALAZINE 100milliGRAM(s) Oral every 8 hours  insulin Infusion 6Unit(s)/Hr IV Continuous <Continuous>  dextrose 5% + sodium chloride 0.45%. 1000milliLiter(s) IV Continuous <Continuous>    MEDICATIONS  (PRN):  dextrose Gel 1Dose(s) Oral once PRN Blood Glucose LESS THAN 70 milliGRAM(s)/deciLiter  glucagon  Injectable 1milliGRAM(s) IntraMuscular once PRN Glucose <70 milliGRAM(s)/deciLiter  oxyCODONE IR 5milliGRAM(s) Oral every 6 hours PRN Moderate Pain (4 - 6)      Vital Signs Last 24 Hrs  T(C): 37, Max: 37.6 (06-16 @ 04:30)  HR: 73 (72 - 106)  BP: 149/69 (119/57 - 189/73)  RR: 13 (10 - 23)  SpO2: 95% (94% - 100%)  Wt(kg): --  CAPILLARY BLOOD GLUCOSE  80 (16 Jun 2017 15:00)  118 (16 Jun 2017 13:00)  160 (16 Jun 2017 12:00)  194 (16 Jun 2017 11:00)  270 (16 Jun 2017 10:00)  369 (16 Jun 2017 09:00)  404 (16 Jun 2017 08:00)  465 (16 Jun 2017 07:00)  514 (16 Jun 2017 06:00)  556 (16 Jun 2017 05:00)  595 (15 Christian 2017 21:53)  445 (15 Christian 2017 15:53)    I&O's Summary  I & Os for 24h ending 16 Jun 2017 07:00  =============================================  IN: 12 ml / OUT: 0 ml / NET: 12 ml    I & Os for current day (as of 16 Jun 2017 15:43)  =============================================  IN: 264.5 ml / OUT: 0 ml / NET: 264.5 ml      PHYSICAL EXAM:  GENERAL: NAD, well-developed  HEAD:  Atraumatic, Normocephalic  EYES: EOMI, PERRLA, conjunctiva and sclera clear  NECK: Supple, No JVD  CHEST/LUNG: Clear to auscultation bilaterally; No wheeze  HEART: Regular rate and rhythm; No murmurs, rubs, or gallops  ABDOMEN: Soft, Nontender, Nondistended; Bowel sounds present  EXTREMITIES:  2+ Peripheral Pulses, No clubbing, cyanosis, Left leg 2+ edema, tender  PSYCH: AAOx3  NEUROLOGY: non-focal  SKIN: No rashes or lesions    LABS:                        8.0    7.7   )-----------( 255      ( 16 Jun 2017 11:35 )             24.0     06-16    139  |  91<L>  |  47<H>  ----------------------------<  190<H>  4.6   |  30  |  6.32<H>    Ca    8.5      16 Jun 2017 11:35  Phos  4.0     06-16  Mg     2.5     06-16    TPro  6.1  /  Alb  3.5  /  TBili  0.2  /  DBili  x   /  AST  29  /  ALT  19  /  AlkPhos  254<H>  06-16    PT/INR - ( 16 Jun 2017 07:07 )   PT: 12.6 sec;   INR: 1.16 ratio         PTT - ( 16 Jun 2017 07:07 )  PTT:27.9 sec  CARDIAC MARKERS ( 15 Christian 2017 20:45 )  x     / 0.13 ng/mL / 234 U/L / x     / 4.8 ng/mL  CARDIAC MARKERS ( 15 Christian 2017 16:41 )  x     / 0.10 ng/mL / 240 U/L / x     / 3.6 ng/mL          RADIOLOGY & ADDITIONAL TESTS:    Imaging Personally Reviewed:    Consultant(s) Notes Reviewed:      Care Discussed with Consultants/Other Providers:

## 2017-06-16 NOTE — CONSULT NOTE ADULT - ASSESSMENT
62 y/o female with well controlled T1DM, acute DKA (now resolved) with neuropathy, ESRD on HD, CAD, PAD s/p bypass, CVA and acute LLE edema and warmth s/p recent LLE angioplasty

## 2017-06-16 NOTE — CONSULT NOTE ADULT - ATTENDING COMMENTS
Agree with assessment and plan as above by Dr. Nixon. Evaluating this 60 y/o F well known to us on insulin pump with Type 1 DM admitted with DKA now improved (High risk patient with high level decision-making). Can restart insulin pump at home settings. Monitor glucose for goal 100-200. No need for tight control given brittle diabetes and risk of hypoglycemia. Plan to d/c on pump. Change site in 3 days. c/w current BP regimen and statin for HLD.

## 2017-06-16 NOTE — PROVIDER CONTACT NOTE (CRITICAL VALUE NOTIFICATION) - ACTION/TREATMENT ORDERED:
KALI Eldridge made aware. KALI Eldridge made aware and states will call house endocrinology to consult.

## 2017-06-16 NOTE — PROVIDER CONTACT NOTE (CRITICAL VALUE NOTIFICATION) - BACKGROUND
Pt admitted for Chest pain and SOB. Hx of DM1, CVA, HTN. Pt on insulin pump and states has been using for 3 years.

## 2017-06-16 NOTE — PROGRESS NOTE ADULT - ASSESSMENT
61 f with multiple medical problems and chest pain  Admit telemetry  cardiac enzymes  cardiology eval called Dr. Vela  ESRD continue HD Dr. Schmidt called.  IDDM type 1 Endocrine evaluation re Insulin pump management called  further action as per clinical course 62yo  PMHX of ACS, CAD, CVA, T1DM on insulin pump, ESRD, Gout HTN HLD and PVD originally admitted with  shortness of breath and palpitations during dialysis, w/u negative for acs source, but now with DKa    neuro - awake and alert   cv - 110 - 150 systolic,   pulm -  gi -   renal -   heme -   id - afebrile, no abx on  endo fs trending down, most recent fs 404  uro  oox  skin 60yo  PMHX of ACS, CAD, CVA, T1DM on insulin pump, ESRD, Gout HTN HLD and PVD originally admitted with  shortness of breath and palpitations during dialysis, w/u negative for acs source, but now with DKa    neuro - awake and alert   cv - 110 - 150 systolic, continue to monitor  pulm - a lines without hypoxia, monitor  gi - NPO pending resolution of AG  renal - esrd, hd per renal  heme - no issue monitor  id - afebrile, no abx on  endo fs trending down, most recent fs 404, cu insulin gtt, give 250cc over 5 hour NS  uro - no irene, patient does not urinate per patient  ppx - asa plavix sqh  skin - no lesion

## 2017-06-16 NOTE — CONSULT NOTE ADULT - SUBJECTIVE AND OBJECTIVE BOX
CHIEF COMPLAINT :chest pain during dialysis, now with DKA    HISTORY OF PRESENT ILLNESS:  HPI:  60yo p/w shortness of breath and palpitations during dialysis. Pt. had recent left lower extremity angio on Friday. Pt. reports that during dialysis, pt. had chest pain, shortness of breath. Pt. reports some resolution of symptoms. Pain is substernal, no aggravating/alleviating factors, pressure, constant, 5/10. Pt. took 162 mg aspirin Denies nuase/vomiting, diarrhea/constipation, dysuria/hematuria, cough, fevers, sick contacts, recent travel, prolonged stasis. (15 Christian 2017 18:49)    Subsequent to this patient transfered to MICU with DKA  No evidence of MI, chest pain now resolved  Patient is S/P multiple PTCI of the RCA last with brachytherapy to RCA  Patient has severe PVD S/P recent vascular intervention    PAST MEDICAL & SURGICAL HISTORY:  Subclavian vein stenosis, left: s/p stent  Cellulitis: 2015 left foot  DKA, type 1: 1/2015  ACS (acute coronary syndrome): 1/2015 - cath revealed 100% ostial stenosis not amenable to PCI - medical management  MI (myocardial infarction): 3/2015 - s/p cardiac cath - no intervention, medical management  MI, old  TIA (transient ischemic attack): x 2 - 8-9 years ago prior to ASD/VSD repair  PAD (peripheral artery disease)  HLD (hyperlipidemia)  HTN (hypertension)  ESRD (end stage renal disease) on dialysis: T/TH/SAT  Type 1 diabetes mellitus  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  CAD (coronary artery disease): s/p stents  Myocardial infarct  Murmur, cardiac  Gout: past  CVA (cerebral infarction): with no residual, 8 yrs ago, prior to heart surgery - ST memory loss  Peripheral vascular disease: occluded left fem-pop bypass 5/2015  Coronary artery disease  Diabetes mellitus type 1: Insulin Dependent - Medtronic  Minimed Paradigm Insulin Pump - Novolog  ESRD (end stage renal disease): dialysis , tue, thursday, saturday  TIA (transient ischemic attack)  x 3 2005 2 nt to VSD/ASD repair  Diabetes mellitus type II  HTN (hypertension), benign  Hyperlipidemia  Status post device closure of ASD: &quot;clamshell&quot;  History of cardiac catheterization: 1/2015 - no intervention  S/P cholecystectomy  AV fistula: Left UE  S/P femoral-popliteal bypass surgery: L and R in 2013 with graft; 5/2015 CFA angioplasty left and ileofemoral endarterectomywith vein patch angioplasty of left fem-pop bypass graft  Multiple vascular surgery both leg, left fempop bypass revision 11/2015  S/P femoral-popliteal bypass surgery: right  march 2013  Stented coronary artery: Metal plug to artery  AV (arteriovenous fistula): Left AV graft; revision with stent placement 2-3 years ago  S/P cholecystectomy  ASD OR VSD  Repair 2005: mesh          MEDICATIONS:  heparin  Injectable 5000Unit(s) SubCutaneous every 12 hours  aspirin enteric coated 81milliGRAM(s) Oral daily  lisinopril 40milliGRAM(s) Oral daily  clopidogrel Tablet 75milliGRAM(s) Oral at bedtime  metoprolol succinate ER 50milliGRAM(s) Oral daily  furosemide    Tablet 40milliGRAM(s) Oral <User Schedule>  hydrALAZINE 100milliGRAM(s) Oral every 8 hours        DULoxetine 60milliGRAM(s) Oral daily  oxyCODONE IR 5milliGRAM(s) Oral every 6 hours PRN      dextrose Gel 1Dose(s) Oral once PRN  dextrose 50% Injectable 12.5Gram(s) IV Push once  dextrose 50% Injectable 25Gram(s) IV Push once  dextrose 50% Injectable 25Gram(s) IV Push once  glucagon  Injectable 1milliGRAM(s) IntraMuscular once PRN  atorvastatin 20milliGRAM(s) Oral at bedtime  insulin Infusion 6Unit(s)/Hr IV Continuous <Continuous>    dextrose 5%. 1000milliLiter(s) IV Continuous <Continuous>  dextrose 5% + sodium chloride 0.45%. 1000milliLiter(s) IV Continuous <Continuous>      	      PHYSICAL EXAM:  T(C): 37.1, Max: 37.6 (06-16 @ 04:30)  HR: 74 (72 - 103)  BP: 124/60 (119/57 - 167/71)  RR: 29 (10 - 29)  SpO2: 96% (93% - 100%)  Wt(kg): --  I&O's Summary  I & Os for 24h ending 16 Jun 2017 07:00  =============================================  IN: 12 ml / OUT: 0 ml / NET: 12 ml    I & Os for current day (as of 16 Jun 2017 18:16)  =============================================  IN: 465.5 ml / OUT: 0 ml / NET: 465.5 ml      Appearance: Sleepy curled in bed no respiratory distress	  Cardiovascular: Normal S1 S2, No JVD, No murmurs, No edema  Respiratory: decreased BS bilaterally	  LABS:	 	    CARDIAC MARKERS:  Troponin T, Serum: 0.13 ng/mL (06-15 @ 20:45)  Troponin T, Serum: 0.10 ng/mL (06-15 @ 16:41)                                  8.0    7.7   )-----------( 255      ( 16 Jun 2017 11:35 )             24.0     06-16    140  |  92<L>  |  50<H>  ----------------------------<  78  4.7   |  29  |  6.65<H>    Ca    8.8      16 Jun 2017 15:48  Phos  4.0     06-16  Mg     2.5     06-16    TPro  6.8  /  Alb  4.0  /  TBili  0.3  /  DBili  x   /  AST  33  /  ALT  20  /  AlkPhos  278<H>  06-16    proBNP:   Lipid Profile:   HgA1c: Hemoglobin A1C, Whole Blood: 7.3 % (06-16 @ 04:45)    TSH:

## 2017-06-16 NOTE — PROGRESS NOTE ADULT - PROBLEM SELECTOR PLAN 1
- D/W endo on call (Dr. Weber) advised to have pt. change pump site - pt. with no new supplies at this time  - advised 10U lantus and 6U humalog STAT x 1, c/w insulin pump x 2 hours after lantus given and then remove pump  - BMP 2 hours post lantus  - FS Q 1 hour  - NPO  - F/U beta hydroxy   - MICU consulted - accepting pt for further management, Dr. Viera to be updated in AM

## 2017-06-16 NOTE — PROGRESS NOTE ADULT - ASSESSMENT
61 f with multiple medical problems and chest pain  Admit telemetry  cardiac enzymes  cardiology vladimir called Dr. Vela  ESRD continue HD Dr. Schmidt called.  IDDM type 1/ DKA  Endocrine evaluation and ICU care  left leg pain possible recurrent celullitis?

## 2017-06-16 NOTE — PROGRESS NOTE ADULT - SUBJECTIVE AND OBJECTIVE BOX
Medicine Night PA Episodic   Pt seen and examined 6/16/17 at 20:40    Subjective:  Notified by RN pt with "HI" reading on glucose monitor. Pt. with h/o type 1 DM on insulin pump, admitted for c/o cp and palpitations during HD treatment today. In ED pt.  pt. gave 6.5U via pump and denies eating anything/drinking anything other then water since that time. Fs repeated 2x as per RN with all readings as HI.     Vital Signs Last 24 Hrs  T(C): 37.6, Max: 37.6 (06-16 @ 04:30)  T(F): 99.6, Max: 99.6 (06-16 @ 04:30)  HR: 100 (90 - 106)  BP: 137/63 (133/70 - 189/73)  BP(mean): 91 (91 - 91)  RR: 23 (18 - 23)  SpO2: 100% (99% - 100%)                          9.5    8.7   )-----------( 234      ( 15 Christian 2017 16:41 )             29.6     06-16    135  |  82<L>  |  36<H>  ----------------------------<  688<HH>  5.7<H>   |  17<L>  |  5.30<H>    Ca    9.5      16 Jun 2017 00:11    TPro  7.7  /  Alb  4.2  /  TBili  0.4  /  DBili  x   /  AST  29  /  ALT  15  /  AlkPhos  312<H>  06-15    CAPILLARY BLOOD GLUCOSE  556 (16 Jun 2017 05:00)  595 (15 Christian 2017 21:53)  445 (15 Christian 2017 15:53)    00:18 - VBG - pH: 7.25  | pCO2: 45    | pO2: 29    | Lactate: 3.4    16:59 - VBG - pH: 7.36  | pCO2: 46    | pO2: 32    | Lactate: 1.7        PHYSICAL EXAM: Medicine Night PA Episodic   Pt seen and examined 6/16/17 at 20:40    Subjective:  Notified by RN pt with "HI" reading on glucose monitor. Pt. with h/o type 1 DM on insulin pump, admitted for c/o cp and palpitations during HD treatment today. In ED pt.  pt. gave 6.5U via pump and denies eating anything/drinking anything other then water since that time. Fs repeated 2x as per RN with all readings as HI. Pt. c/o similar CP to which she presented with and nausea.     Vital Signs Last 24 Hrs  T(C): 37.6, Max: 37.6 (06-16 @ 04:30)  T(F): 99.6, Max: 99.6 (06-16 @ 04:30)  HR: 100 (90 - 106)  BP: 137/63 (133/70 - 189/73)  BP(mean): 91 (91 - 91)  RR: 23 (18 - 23)  SpO2: 100% (99% - 100%)                          9.5    8.7   )-----------( 234      ( 15 Christian 2017 16:41 )             29.6     06-16    135  |  82<L>  |  36<H>  ----------------------------<  688<HH>  5.7<H>   |  17<L>  |  5.30<H>    Ca    9.5      16 Jun 2017 00:11    TPro  7.7  /  Alb  4.2  /  TBili  0.4  /  DBili  x   /  AST  29  /  ALT  15  /  AlkPhos  312<H>  06-15    CAPILLARY BLOOD GLUCOSE  556 (16 Jun 2017 05:00)  595 (15 Christian 2017 21:53)  445 (15 Christian 2017 15:53)    00:18 - VBG - pH: 7.25  | pCO2: 45    | pO2: 29    | Lactate: 3.4    16:59 - VBG - pH: 7.36  | pCO2: 46    | pO2: 32    | Lactate: 1.7        PHYSICAL EXAM:    General: WN/WD NAD  Neurology: A&Ox3, nonfocal  Head:  Normocephalic, atraumatic  Respiratory: CTA B/L, NAD  CV: S1/S2 regular rate, no gallops, no rubs   Abdominal: Soft, NT, ND no palpable mass  MSK: No edema, + peripheral pulses, ROM noted to all 4 extremity Medicine Night PA Episodic   Pt seen and examined 6/16/17 at 20:40    Subjective:  Notified by RN pt with "HI" reading on glucose monitor. Pt. with h/o type 1 DM on insulin pump, admitted for c/o cp and palpitations during HD treatment today. In ED pt.  pt. gave 6.5U via pump and denies eating anything/drinking anything other then water since that time. Fs repeated 2x as per RN with all readings as HI. Pt. c/o similar substernal CP to which she presented with and nausea.     Vital Signs Last 24 Hrs  T(C): 37.6, Max: 37.6 (06-16 @ 04:30)  T(F): 99.6, Max: 99.6 (06-16 @ 04:30)  HR: 100 (90 - 106)  BP: 137/63 (133/70 - 189/73)  BP(mean): 91 (91 - 91)  RR: 23 (18 - 23)  SpO2: 100% (99% - 100%)                          9.5    8.7   )-----------( 234      ( 15 Christian 2017 16:41 )             29.6     06-16    135  |  82<L>  |  36<H>  ----------------------------<  688<HH>  5.7<H>   |  17<L>  |  5.30<H>    Ca    9.5      16 Jun 2017 00:11    TPro  7.7  /  Alb  4.2  /  TBili  0.4  /  DBili  x   /  AST  29  /  ALT  15  /  AlkPhos  312<H>  06-15    CAPILLARY BLOOD GLUCOSE  556 (16 Jun 2017 05:00)  595 (15 Christian 2017 21:53)  445 (15 Christian 2017 15:53)    00:18 - VBG - pH: 7.25  | pCO2: 45    | pO2: 29    | Lactate: 3.4    16:59 - VBG - pH: 7.36  | pCO2: 46    | pO2: 32    | Lactate: 1.7        PHYSICAL EXAM:    General: WN/WD NAD  Neurology: A&Ox3, nonfocal  Head:  Normocephalic, atraumatic  Respiratory: CTA B/L, NAD  CV: S1/S2 regular rate, no gallops, no rubs   Abdominal: Soft, NT, ND no palpable mass  MSK: 1+ edema, + peripheral pulses, ROM noted to all 4 extremity Medicine Night PA Episodic   Pt seen and examined 6/16/17 at 20:40    Subjective:  Notified by RN pt with "HI" reading on glucose monitor. Pt. with h/o type 1 DM on insulin pump, admitted for c/o cp and palpitations during HD treatment today. In ED pt.  pt. gave 6.5U via pump and denies eating anything/drinking anything other then water since that time. Fs repeated 2x as per RN with all readings as HI. Pt. c/o similar substernal CP to which she presented with and nausea.     Vital Signs Last 24 Hrs  T(C): 37.6, Max: 37.6 (06-16 @ 04:30)  T(F): 99.6, Max: 99.6 (06-16 @ 04:30)  HR: 100 (90 - 106)  BP: 137/63 (133/70 - 189/73)  BP(mean): 91 (91 - 91)  RR: 23 (18 - 23)  SpO2: 100% (99% - 100%)                          9.5    8.7   )-----------( 234      ( 15 Christian 2017 16:41 )             29.6     06-16    135  |  82<L>  |  36<H>  ----------------------------<  688<HH>  5.7<H>   |  17<L>  |  5.30<H>    Ca    9.5      16 Jun 2017 00:11    TPro  7.7  /  Alb  4.2  /  TBili  0.4  /  DBili  x   /  AST  29  /  ALT  15  /  AlkPhos  312<H>  06-15    CAPILLARY BLOOD GLUCOSE  556 (16 Jun 2017 05:00)  595 (15 Christian 2017 21:53)  445 (15 Christian 2017 15:53)    00:18 - VBG - pH: 7.25  | pCO2: 45    | pO2: 29    | Lactate: 3.4    16:59 - VBG - pH: 7.36  | pCO2: 46    | pO2: 32    | Lactate: 1.7        PHYSICAL EXAM:    General: WN/WD NAD  Neurology: A&Ox3, nonfocal  Head:  Normocephalic, atraumatic  Respiratory: CTA B/L, NAD  CV: S1/S2 regular rate, no gallops, no rubs   Abdominal: Soft, NT, ND no palpable mass, insulin pump noted to Left lower abdomen, appears intact and working  MSK: 1+ edema, + peripheral pulses, ROM noted to all 4 extremity

## 2017-06-16 NOTE — PROGRESS NOTE ADULT - SUBJECTIVE AND OBJECTIVE BOX
CHIEF COMPLAINT: intitially cp, came to [facility] for dka    Interval Events: insulin gtt overnight    REVIEW OF SYSTEMS:  Constitutional: [ ] fevers [ ] chills [ ] weight loss [ ] weight gain  HEENT: [ ] dry eyes [ ] eye irritation [ ] postnasal drip [ ] nasal congestion  CV: [ ] chest pain [ ] orthopnea [ ] palpitations [ ] murmur  Resp: [ ] cough [ ] shortness of breath [ ] dyspnea [ ] wheezing [ ] sputum [ ] hemoptysis  GI: [ ] nausea [ ] vomiting [ ] diarrhea [ ] constipation [ ] abd pain [ ] dysphagia   : [ ] dysuria [ ] nocturia [ ] hematuria [ ] increased urinary frequency  Musculoskeletal: [ ] back pain [ ] myalgias [ ] arthralgias [ ] fracture  Skin: [ ] rash [ ] itch  Neurological: [ ] headache [ ] dizziness [ ] syncope [ ] weakness [ ] numbness  Psychiatric: [ ] anxiety [ ] depression  Endocrine: [ ] diabetes [ ] thyroid problem  Hematologic/Lymphatic: [ ] anemia [ ] bleeding problem  Allergic/Immunologic: [ ] itchy eyes [ ] nasal discharge [ ] hives [ ] angioedema  [ ] All other systems negative  [ ] Unable to assess ROS because ________    OBJECTIVE:  ICU Vital Signs Last 24 Hrs  T(C): 37.6, Max: 37.6 (06-16 @ 04:30)  T(F): 99.6, Max: 99.6 (06-16 @ 04:30)  HR: 84 (84 - 106)  BP: 146/67 (119/57 - 189/73)  BP(mean): 97 (82 - 97)  ABP: --  ABP(mean): --  RR: 14 (14 - 23)  SpO2: 100% (99% - 100%)        I & Os for current day (as of 06-16 @ 07:41)  =============================================  IN: 12 ml / OUT: 0 ml / NET: 12 ml    CAPILLARY BLOOD GLUCOSE  465 (16 Jun 2017 07:00)      PHYSICAL EXAM:  General:   HEENT:   Lymph Nodes:  Neck:   Respiratory:   Cardiovascular:   Abdomen:   Extremities:   Skin:   Neurological:  Psychiatry:    LINES:    HOSPITAL MEDICATIONS:  MEDICATIONS  (STANDING):  dextrose 5%. 1000milliLiter(s) IV Continuous <Continuous>  dextrose 50% Injectable 12.5Gram(s) IV Push once  dextrose 50% Injectable 25Gram(s) IV Push once  dextrose 50% Injectable 25Gram(s) IV Push once  heparin  Injectable 5000Unit(s) SubCutaneous every 12 hours  aspirin enteric coated 81milliGRAM(s) Oral daily  lisinopril 40milliGRAM(s) Oral daily  DULoxetine 60milliGRAM(s) Oral daily  atorvastatin 20milliGRAM(s) Oral at bedtime  clopidogrel Tablet 75milliGRAM(s) Oral at bedtime  metoprolol succinate ER 50milliGRAM(s) Oral daily  furosemide    Tablet 40milliGRAM(s) Oral <User Schedule>  cinacalcet 60milliGRAM(s) Oral daily  sevelamer hydrochloride 2400milliGRAM(s) Oral three times a day with meals  hydrALAZINE 100milliGRAM(s) Oral every 8 hours  insulin Infusion 6Unit(s)/Hr IV Continuous <Continuous>    MEDICATIONS  (PRN):  dextrose Gel 1Dose(s) Oral once PRN Blood Glucose LESS THAN 70 milliGRAM(s)/deciLiter  glucagon  Injectable 1milliGRAM(s) IntraMuscular once PRN Glucose <70 milliGRAM(s)/deciLiter      LABS:                        9.5    8.7   )-----------( 234      ( 15 Christian 2017 16:41 )             29.6     Hgb Trend: 9.5<--, 8.2<--  06-16    135  |  82<L>  |  36<H>  ----------------------------<  688<HH>  5.7<H>   |  17<L>  |  5.30<H>    Ca    9.5      16 Jun 2017 00:11    TPro  7.7  /  Alb  4.2  /  TBili  0.4  /  DBili  x   /  AST  29  /  ALT  15  /  AlkPhos  312<H>  06-15    Creatinine Trend: 5.30<--, 4.46<--, 6.60<--, 4.25<--, 4.81<--, 8.18<--  PT/INR - ( 16 Jun 2017 07:07 )   PT: 12.6 sec;   INR: 1.16 ratio         PTT - ( 16 Jun 2017 07:07 )  PTT:27.9 sec      Venous Blood Gas:  06-16 @ 07:08  7.37/38/58/22/88  VBG Lactate: --  Venous Blood Gas:  06-16 @ 00:18  7.25/45/29/19/38  VBG Lactate: 3.4  Venous Blood Gas:  06-15 @ 16:59  7.36/46/32/26/50  VBG Lactate: 1.7      MICROBIOLOGY:     RADIOLOGY:  [ x] Reviewed and interpreted by me CHIEF COMPLAINT: intitially cp, came to [facility] for dka    Interval Events: insulin gtt overnight    REVIEW OF SYSTEMS:  no fever chills cough ha diarrhea dysuria cp palpitations sob    OBJECTIVE:  ICU Vital Signs Last 24 Hrs  T(C): 37.6, Max: 37.6 (06-16 @ 04:30)  T(F): 99.6, Max: 99.6 (06-16 @ 04:30)  HR: 84 (84 - 106)  BP: 146/67 (119/57 - 189/73)  BP(mean): 97 (82 - 97)  ABP: --  ABP(mean): --  RR: 14 (14 - 23)  SpO2: 100% (99% - 100%)        I & Os for current day (as of 06-16 @ 07:41)  =============================================  IN: 12 ml / OUT: 0 ml / NET: 12 ml    CAPILLARY BLOOD GLUCOSE  465 (16 Jun 2017 07:00)      PHYSICAL EXAM:  General: nad comf  HEENT: ncat  Lymph Nodes: no lad  Neck: no jvd supple  Respiratory: ctab no wrr  Cardiovascular: rrr no mrg  Abdomen: s, nondistended, nt  Extremities: w wp no edema  Skin: no les nor ulcer  Neurological: nfd no weakness  Psychiatry: nl mood affect    LINES:    HOSPITAL MEDICATIONS:  MEDICATIONS  (STANDING):  dextrose 5%. 1000milliLiter(s) IV Continuous <Continuous>  dextrose 50% Injectable 12.5Gram(s) IV Push once  dextrose 50% Injectable 25Gram(s) IV Push once  dextrose 50% Injectable 25Gram(s) IV Push once  heparin  Injectable 5000Unit(s) SubCutaneous every 12 hours  aspirin enteric coated 81milliGRAM(s) Oral daily  lisinopril 40milliGRAM(s) Oral daily  DULoxetine 60milliGRAM(s) Oral daily  atorvastatin 20milliGRAM(s) Oral at bedtime  clopidogrel Tablet 75milliGRAM(s) Oral at bedtime  metoprolol succinate ER 50milliGRAM(s) Oral daily  furosemide    Tablet 40milliGRAM(s) Oral <User Schedule>  cinacalcet 60milliGRAM(s) Oral daily  sevelamer hydrochloride 2400milliGRAM(s) Oral three times a day with meals  hydrALAZINE 100milliGRAM(s) Oral every 8 hours  insulin Infusion 6Unit(s)/Hr IV Continuous <Continuous>    MEDICATIONS  (PRN):  dextrose Gel 1Dose(s) Oral once PRN Blood Glucose LESS THAN 70 milliGRAM(s)/deciLiter  glucagon  Injectable 1milliGRAM(s) IntraMuscular once PRN Glucose <70 milliGRAM(s)/deciLiter      LABS:                        9.5    8.7   )-----------( 234      ( 15 Christian 2017 16:41 )             29.6     Hgb Trend: 9.5<--, 8.2<--  06-16    135  |  82<L>  |  36<H>  ----------------------------<  688<HH>  5.7<H>   |  17<L>  |  5.30<H>    Ca    9.5      16 Jun 2017 00:11    TPro  7.7  /  Alb  4.2  /  TBili  0.4  /  DBili  x   /  AST  29  /  ALT  15  /  AlkPhos  312<H>  06-15    Creatinine Trend: 5.30<--, 4.46<--, 6.60<--, 4.25<--, 4.81<--, 8.18<--  PT/INR - ( 16 Jun 2017 07:07 )   PT: 12.6 sec;   INR: 1.16 ratio         PTT - ( 16 Jun 2017 07:07 )  PTT:27.9 sec      Venous Blood Gas:  06-16 @ 07:08  7.37/38/58/22/88  VBG Lactate: --  Venous Blood Gas:  06-16 @ 00:18  7.25/45/29/19/38  VBG Lactate: 3.4  Venous Blood Gas:  06-15 @ 16:59  7.36/46/32/26/50  VBG Lactate: 1.7      MICROBIOLOGY:     RADIOLOGY:  [ x] Reviewed and interpreted by me CHIEF COMPLAINT: intitially cp, came to [facility] for dka    Interval Events: insulin gtt overnight    REVIEW OF SYSTEMS:  no fever chills cough ha diarrhea dysuria cp palpitations sob    OBJECTIVE:  ICU Vital Signs Last 24 Hrs  T(C): 37.6, Max: 37.6 (06-16 @ 04:30)  T(F): 99.6, Max: 99.6 (06-16 @ 04:30)  HR: 84 (84 - 106)  BP: 146/67 (119/57 - 189/73)  BP(mean): 97 (82 - 97)  ABP: --  ABP(mean): --  RR: 14 (14 - 23)  SpO2: 100% (99% - 100%)        I & Os for current day (as of 06-16 @ 07:41)  =============================================  IN: 12 ml / OUT: 0 ml / NET: 12 ml    CAPILLARY BLOOD GLUCOSE  465 (16 Jun 2017 07:00)      PHYSICAL EXAM:  General: nad comf  HEENT: ncat  Lymph Nodes: no lad  Neck: no jvd supple  Respiratory: ctab no wrr  Cardiovascular: rrr no mrg  Abdomen: s, nondistended, nt  Extremities: w wp no edema  Skin: no les nor ulcer  Neurological: nfd no weakness  Psychiatry: nl mood affect    LINES: peripheral iv only    HOSPITAL MEDICATIONS:  MEDICATIONS  (STANDING):  dextrose 5%. 1000milliLiter(s) IV Continuous <Continuous>  dextrose 50% Injectable 12.5Gram(s) IV Push once  dextrose 50% Injectable 25Gram(s) IV Push once  dextrose 50% Injectable 25Gram(s) IV Push once  heparin  Injectable 5000Unit(s) SubCutaneous every 12 hours  aspirin enteric coated 81milliGRAM(s) Oral daily  lisinopril 40milliGRAM(s) Oral daily  DULoxetine 60milliGRAM(s) Oral daily  atorvastatin 20milliGRAM(s) Oral at bedtime  clopidogrel Tablet 75milliGRAM(s) Oral at bedtime  metoprolol succinate ER 50milliGRAM(s) Oral daily  furosemide    Tablet 40milliGRAM(s) Oral <User Schedule>  cinacalcet 60milliGRAM(s) Oral daily  sevelamer hydrochloride 2400milliGRAM(s) Oral three times a day with meals  hydrALAZINE 100milliGRAM(s) Oral every 8 hours  insulin Infusion 6Unit(s)/Hr IV Continuous <Continuous>    MEDICATIONS  (PRN):  dextrose Gel 1Dose(s) Oral once PRN Blood Glucose LESS THAN 70 milliGRAM(s)/deciLiter  glucagon  Injectable 1milliGRAM(s) IntraMuscular once PRN Glucose <70 milliGRAM(s)/deciLiter      LABS:                        9.5    8.7   )-----------( 234      ( 15 Christian 2017 16:41 )             29.6     Hgb Trend: 9.5<--, 8.2<--  06-16    135  |  82<L>  |  36<H>  ----------------------------<  688<HH>  5.7<H>   |  17<L>  |  5.30<H>    Ca    9.5      16 Jun 2017 00:11    TPro  7.7  /  Alb  4.2  /  TBili  0.4  /  DBili  x   /  AST  29  /  ALT  15  /  AlkPhos  312<H>  06-15    Creatinine Trend: 5.30<--, 4.46<--, 6.60<--, 4.25<--, 4.81<--, 8.18<--  PT/INR - ( 16 Jun 2017 07:07 )   PT: 12.6 sec;   INR: 1.16 ratio         PTT - ( 16 Jun 2017 07:07 )  PTT:27.9 sec      Venous Blood Gas:  06-16 @ 07:08  7.37/38/58/22/88  VBG Lactate: --  Venous Blood Gas:  06-16 @ 00:18  7.25/45/29/19/38  VBG Lactate: 3.4  Venous Blood Gas:  06-15 @ 16:59  7.36/46/32/26/50  VBG Lactate: 1.7      MICROBIOLOGY:     RADIOLOGY:  [ x] Reviewed and interpreted by me

## 2017-06-16 NOTE — CONSULT NOTE ADULT - PROBLEM SELECTOR RECOMMENDATION 4
Continue Lipitor 20mg QHS    D/w attending    Carolina Nixon DO  Endocrine Fellow   Pager: [phone].

## 2017-06-16 NOTE — CONSULT NOTE ADULT - PROBLEM SELECTOR RECOMMENDATION 9
AG high at 19 with small acetone and elevated BUN but Bicarb is 29. DKA seems to have resolved and patient may have starvation ketoacidosis or uremic ketoacidosis at this time. Recommend continue IVF hydration and transition to insulin pump at home settings and TID AC and HS FS. With suspect LLE cellulitis (early), she may need higher temp basal at 115% incase FS are persistently >250 on home pump settings after changing site to r/o absorption problems    For outpatient: Resume home insulin pump

## 2017-06-16 NOTE — PROVIDER CONTACT NOTE (CRITICAL VALUE NOTIFICATION) - BACKGROUND
Pt admitted for Chest pain and SOB. Hx of DM1, CVA, HTN. Pt on insulin pump and states has been using for 3 years. Unable to obtain value for fingerstick blood glucose on admit to floor; resulted "HI" and out of reportable range; STAT labs drawn to obtain results.

## 2017-06-16 NOTE — CONSULT NOTE ADULT - ASSESSMENT
Patient presently has no chest pain or acute cardiac issues  Pain on admission not likely ischemic  Patient has a multitude of medical problems as noted with DKA, HBP PVD    Recommend: no further cardiac evaluation at this time  overall prognosis is guarded

## 2017-06-16 NOTE — PROGRESS NOTE ADULT - SUBJECTIVE AND OBJECTIVE BOX
CHIEF COMPLAINT: DKA    Interval Events:  called by floor NP to eval for DKA    REVIEW OF SYSTEMS:  Constitutional: [ ] fevers [ ] chills [ ] weight loss [ ] weight gain  HEENT: [ ] dry eyes [ ] eye irritation [ ] postnasal drip [ ] nasal congestion  CV: [ ] chest pain [ ] orthopnea [ ] palpitations [ ] murmur  Resp: [ ] cough [ ] shortness of breath [ ] dyspnea [ ] wheezing [ ] sputum [ ] hemoptysis  GI: [ ] nausea [ ] vomiting [ ] diarrhea [ ] constipation [ ] abd pain [ ] dysphagia   : [ ] dysuria [ ] nocturia [ ] hematuria [ ] increased urinary frequency  Musculoskeletal: [ ] back pain [ ] myalgias [ ] arthralgias [ ] fracture  Skin: [ ] rash [ ] itch  Neurological: [ ] headache [ ] dizziness [ ] syncope [ ] weakness [ ] numbness  Psychiatric: [ ] anxiety [ ] depression  Endocrine: [ ] diabetes [ ] thyroid problem  Hematologic/Lymphatic: [ ] anemia [ ] bleeding problem  Allergic/Immunologic: [ ] itchy eyes [ ] nasal discharge [ ] hives [ ] angioedema  [ ] All other systems negative  [ ] Unable to assess ROS because ________    OBJECTIVE:  ICU Vital Signs Last 24 Hrs  T(C): 36.7, Max: 36.8 (06-16 @ 01:41)  T(F): 98.1, Max: 98.2 (06-16 @ 01:41)  HR: 98 (90 - 106)  BP: 147/69 (133/70 - 189/73)  BP(mean): --  ABP: --  ABP(mean): --  RR: 18 (18 - 20)  SpO2: 100% (99% - 100%)        CAPILLARY BLOOD GLUCOSE  595 (15 Christian 2017 21:53)      PHYSICAL EXAM:  General:   HEENT:   Lymph Nodes:  Neck:   Respiratory:   Cardiovascular:   Abdomen:   Extremities:   Skin:   Neurological:  Psychiatry:    LINES:    HOSPITAL MEDICATIONS:  MEDICATIONS  (STANDING):  dextrose 5%. 1000milliLiter(s) IV Continuous <Continuous>  dextrose 50% Injectable 12.5Gram(s) IV Push once  dextrose 50% Injectable 25Gram(s) IV Push once  dextrose 50% Injectable 25Gram(s) IV Push once  insulin aspart (NovoLOG) Pump 1Each SubCutaneous Continuous Pump  heparin  Injectable 5000Unit(s) SubCutaneous every 12 hours  aspirin enteric coated 81milliGRAM(s) Oral daily  lisinopril 40milliGRAM(s) Oral daily  DULoxetine 60milliGRAM(s) Oral daily  atorvastatin 20milliGRAM(s) Oral at bedtime  clopidogrel Tablet 75milliGRAM(s) Oral at bedtime  metoprolol succinate ER 50milliGRAM(s) Oral daily  furosemide    Tablet 40milliGRAM(s) Oral <User Schedule>  cinacalcet 60milliGRAM(s) Oral daily  sevelamer hydrochloride 2400milliGRAM(s) Oral three times a day with meals  hydrALAZINE 100milliGRAM(s) Oral every 8 hours    MEDICATIONS  (PRN):  dextrose Gel 1Dose(s) Oral once PRN Blood Glucose LESS THAN 70 milliGRAM(s)/deciLiter  glucagon  Injectable 1milliGRAM(s) IntraMuscular once PRN Glucose <70 milliGRAM(s)/deciLiter  oxyCODONE  5 mG/acetaminophen 325 mG 1Tablet(s) Oral every 6 hours PRN Severe Pain (7 - 10)      LABS:                        9.5    8.7   )-----------( 234      ( 15 Christian 2017 16:41 )             29.6     Hgb Trend: 9.5<--, 8.2<--, 9.1<--  06-16    135  |  82<L>  |  36<H>  ----------------------------<  688<HH>  5.7<H>   |  17<L>  |  5.30<H>    Ca    9.5      16 Jun 2017 00:11    TPro  7.7  /  Alb  4.2  /  TBili  0.4  /  DBili  x   /  AST  29  /  ALT  15  /  AlkPhos  312<H>  06-15    Creatinine Trend: 5.30<--, 4.46<--, 6.60<--, 4.25<--, 4.81<--, 8.18<--  PT/INR - ( 15 Christian 2017 17:48 )   PT: 11.9 sec;   INR: 1.10 ratio         PTT - ( 15 Christian 2017 17:48 )  PTT:31.3 sec      Venous Blood Gas:  06-16 @ 00:18  7.25/45/29/19/38  VBG Lactate: 3.4  Venous Blood Gas:  06-15 @ 16:59  7.36/46/32/26/50  VBG Lactate: 1.7      MICROBIOLOGY:     RADIOLOGY:  [ ] Reviewed and interpreted by me    EKG:

## 2017-06-17 DIAGNOSIS — E10.10 TYPE 1 DIABETES MELLITUS WITH KETOACIDOSIS WITHOUT COMA: ICD-10-CM

## 2017-06-17 DIAGNOSIS — I73.9 PERIPHERAL VASCULAR DISEASE, UNSPECIFIED: ICD-10-CM

## 2017-06-17 LAB
ACETONE SERPL-MCNC: NEGATIVE — SIGNIFICANT CHANGE UP
ANION GAP SERPL CALC-SCNC: 19 MMOL/L — HIGH (ref 5–17)
ANION GAP SERPL CALC-SCNC: 19 MMOL/L — HIGH (ref 5–17)
APTT BLD: 28.1 SEC — SIGNIFICANT CHANGE UP (ref 27.5–37.4)
BASOPHILS # BLD AUTO: 0.1 K/UL — SIGNIFICANT CHANGE UP (ref 0–0.2)
BASOPHILS NFR BLD AUTO: 0.7 % — SIGNIFICANT CHANGE UP (ref 0–2)
BUN SERPL-MCNC: 53 MG/DL — HIGH (ref 7–23)
BUN SERPL-MCNC: 53 MG/DL — HIGH (ref 7–23)
CALCIUM SERPL-MCNC: 7.8 MG/DL — LOW (ref 8.4–10.5)
CALCIUM SERPL-MCNC: 8.1 MG/DL — LOW (ref 8.4–10.5)
CHLORIDE SERPL-SCNC: 90 MMOL/L — LOW (ref 96–108)
CHLORIDE SERPL-SCNC: 92 MMOL/L — LOW (ref 96–108)
CK MB BLD-MCNC: 3.3 % — SIGNIFICANT CHANGE UP (ref 0–3.5)
CK MB CFR SERPL CALC: 7.7 NG/ML — HIGH (ref 0–3.8)
CK SERPL-CCNC: 234 U/L — HIGH (ref 25–170)
CO2 SERPL-SCNC: 28 MMOL/L — SIGNIFICANT CHANGE UP (ref 22–31)
CO2 SERPL-SCNC: 29 MMOL/L — SIGNIFICANT CHANGE UP (ref 22–31)
CREAT SERPL-MCNC: 6.9 MG/DL — HIGH (ref 0.5–1.3)
CREAT SERPL-MCNC: 7.32 MG/DL — HIGH (ref 0.5–1.3)
EOSINOPHIL # BLD AUTO: 0.1 K/UL — SIGNIFICANT CHANGE UP (ref 0–0.5)
EOSINOPHIL NFR BLD AUTO: 1 % — SIGNIFICANT CHANGE UP (ref 0–6)
GLUCOSE SERPL-MCNC: 71 MG/DL — SIGNIFICANT CHANGE UP (ref 70–99)
GLUCOSE SERPL-MCNC: 85 MG/DL — SIGNIFICANT CHANGE UP (ref 70–99)
HCT VFR BLD CALC: 24.6 % — LOW (ref 34.5–45)
HGB BLD-MCNC: 8.2 G/DL — LOW (ref 11.5–15.5)
INR BLD: 1.14 RATIO — SIGNIFICANT CHANGE UP (ref 0.88–1.16)
LYMPHOCYTES # BLD AUTO: 1.4 K/UL — SIGNIFICANT CHANGE UP (ref 1–3.3)
LYMPHOCYTES # BLD AUTO: 17.9 % — SIGNIFICANT CHANGE UP (ref 13–44)
MAGNESIUM SERPL-MCNC: 2.4 MG/DL — SIGNIFICANT CHANGE UP (ref 1.6–2.6)
MAGNESIUM SERPL-MCNC: 2.5 MG/DL — SIGNIFICANT CHANGE UP (ref 1.6–2.6)
MCHC RBC-ENTMCNC: 33.3 GM/DL — SIGNIFICANT CHANGE UP (ref 32–36)
MCHC RBC-ENTMCNC: 34.3 PG — HIGH (ref 27–34)
MCV RBC AUTO: 103 FL — HIGH (ref 80–100)
MONOCYTES # BLD AUTO: 0.6 K/UL — SIGNIFICANT CHANGE UP (ref 0–0.9)
MONOCYTES NFR BLD AUTO: 8.1 % — SIGNIFICANT CHANGE UP (ref 2–14)
NEUTROPHILS # BLD AUTO: 5.5 K/UL — SIGNIFICANT CHANGE UP (ref 1.8–7.4)
NEUTROPHILS NFR BLD AUTO: 72.2 % — SIGNIFICANT CHANGE UP (ref 43–77)
PHOSPHATE SERPL-MCNC: 4.2 MG/DL — SIGNIFICANT CHANGE UP (ref 2.5–4.5)
PHOSPHATE SERPL-MCNC: 4.2 MG/DL — SIGNIFICANT CHANGE UP (ref 2.5–4.5)
PLATELET # BLD AUTO: 276 K/UL — SIGNIFICANT CHANGE UP (ref 150–400)
POTASSIUM SERPL-MCNC: 4.3 MMOL/L — SIGNIFICANT CHANGE UP (ref 3.5–5.3)
POTASSIUM SERPL-MCNC: 4.6 MMOL/L — SIGNIFICANT CHANGE UP (ref 3.5–5.3)
POTASSIUM SERPL-SCNC: 4.3 MMOL/L — SIGNIFICANT CHANGE UP (ref 3.5–5.3)
POTASSIUM SERPL-SCNC: 4.6 MMOL/L — SIGNIFICANT CHANGE UP (ref 3.5–5.3)
PROTHROM AB SERPL-ACNC: 12.5 SEC — SIGNIFICANT CHANGE UP (ref 9.8–12.7)
RBC # BLD: 2.39 M/UL — LOW (ref 3.8–5.2)
RBC # FLD: 13.7 % — SIGNIFICANT CHANGE UP (ref 10.3–14.5)
SODIUM SERPL-SCNC: 138 MMOL/L — SIGNIFICANT CHANGE UP (ref 135–145)
SODIUM SERPL-SCNC: 139 MMOL/L — SIGNIFICANT CHANGE UP (ref 135–145)
TROPONIN T SERPL-MCNC: 0.8 NG/ML — HIGH (ref 0–0.06)
TROPONIN T SERPL-MCNC: 0.87 NG/ML — HIGH (ref 0–0.06)
WBC # BLD: 7.6 K/UL — SIGNIFICANT CHANGE UP (ref 3.8–10.5)
WBC # FLD AUTO: 7.6 K/UL — SIGNIFICANT CHANGE UP (ref 3.8–10.5)

## 2017-06-17 PROCEDURE — 99233 SBSQ HOSP IP/OBS HIGH 50: CPT | Mod: GC

## 2017-06-17 PROCEDURE — 93010 ELECTROCARDIOGRAM REPORT: CPT

## 2017-06-17 RX ORDER — INSULIN LISPRO 100/ML
1 VIAL (ML) SUBCUTANEOUS
Qty: 0 | Refills: 0 | Status: DISCONTINUED | OUTPATIENT
Start: 2017-06-17 | End: 2017-06-19

## 2017-06-17 RX ADMIN — LISINOPRIL 40 MILLIGRAM(S): 2.5 TABLET ORAL at 11:11

## 2017-06-17 RX ADMIN — SEVELAMER CARBONATE 2400 MILLIGRAM(S): 2400 POWDER, FOR SUSPENSION ORAL at 09:49

## 2017-06-17 RX ADMIN — SEVELAMER CARBONATE 2400 MILLIGRAM(S): 2400 POWDER, FOR SUSPENSION ORAL at 13:08

## 2017-06-17 RX ADMIN — Medication 50 MILLIGRAM(S): at 06:19

## 2017-06-17 RX ADMIN — CINACALCET 60 MILLIGRAM(S): 30 TABLET, FILM COATED ORAL at 11:12

## 2017-06-17 RX ADMIN — DULOXETINE HYDROCHLORIDE 60 MILLIGRAM(S): 30 CAPSULE, DELAYED RELEASE ORAL at 11:11

## 2017-06-17 RX ADMIN — OXYCODONE HYDROCHLORIDE 5 MILLIGRAM(S): 5 TABLET ORAL at 18:00

## 2017-06-17 RX ADMIN — OXYCODONE HYDROCHLORIDE 5 MILLIGRAM(S): 5 TABLET ORAL at 17:34

## 2017-06-17 RX ADMIN — CLOPIDOGREL BISULFATE 75 MILLIGRAM(S): 75 TABLET, FILM COATED ORAL at 21:01

## 2017-06-17 RX ADMIN — SEVELAMER CARBONATE 2400 MILLIGRAM(S): 2400 POWDER, FOR SUSPENSION ORAL at 17:35

## 2017-06-17 RX ADMIN — Medication 81 MILLIGRAM(S): at 11:11

## 2017-06-17 RX ADMIN — Medication 100 MILLIGRAM(S): at 21:01

## 2017-06-17 RX ADMIN — Medication 100 MILLIGRAM(S): at 13:08

## 2017-06-17 RX ADMIN — Medication 100 MILLIGRAM(S): at 06:19

## 2017-06-17 RX ADMIN — ATORVASTATIN CALCIUM 20 MILLIGRAM(S): 80 TABLET, FILM COATED ORAL at 21:01

## 2017-06-17 NOTE — PROGRESS NOTE ADULT - PROBLEM SELECTOR PLAN 2
- asymptomatic at this time  - elevation tropp- difficult to interpret with CKD   - CKMB only slightly elevated, continue to monitor qD or with chest pain/SOB repeat trop and EKG  - continue aspirin/plavis

## 2017-06-17 NOTE — PROGRESS NOTE ADULT - SUBJECTIVE AND OBJECTIVE BOX
[facility]   [phone]  DIALYSIS NOTE  Chief Complaint: ESRD/Ongoing hemodialysis requirement. seen on hd    24 hour events/subjective:      states does not feel well this morning had FS low 60's. drinking apple juice    ALLERGIES & MEDICATIONS  --------------------------------------------------------------------------------  Allergies    No Known Allergies    Intolerances      Standing Inpatient Medications  dextrose 5%. 1000milliLiter(s) IV Continuous <Continuous>  dextrose 50% Injectable 12.5Gram(s) IV Push once  dextrose 50% Injectable 25Gram(s) IV Push once  dextrose 50% Injectable 25Gram(s) IV Push once  heparin  Injectable 5000Unit(s) SubCutaneous every 12 hours  aspirin enteric coated 81milliGRAM(s) Oral daily  lisinopril 40milliGRAM(s) Oral daily  DULoxetine 60milliGRAM(s) Oral daily  atorvastatin 20milliGRAM(s) Oral at bedtime  clopidogrel Tablet 75milliGRAM(s) Oral at bedtime  metoprolol succinate ER 50milliGRAM(s) Oral daily  furosemide    Tablet 40milliGRAM(s) Oral <User Schedule>  cinacalcet 60milliGRAM(s) Oral daily  sevelamer hydrochloride 2400milliGRAM(s) Oral three times a day with meals  hydrALAZINE 100milliGRAM(s) Oral every 8 hours  insulin lispro (HumaLOG) Pump 1Each SubCutaneous Continuous Pump    PRN Inpatient Medications  dextrose Gel 1Dose(s) Oral once PRN  glucagon  Injectable 1milliGRAM(s) IntraMuscular once PRN  oxyCODONE IR 5milliGRAM(s) Oral every 6 hours PRN      REVIEW OF SYSTEMS  --------------------------------------------------------------------------------  no itching or rash  no fever or chill  no cp or palp   no sob or cough   no N/V/D/ no abd pain   ext + edema no pain         VITALS/PHYSICAL EXAM  --------------------------------------------------------------------------------  T(C): 36.7, Max: 37.1 (06-16 @ 12:00)  HR: 68 (63 - 84)  BP: 154/71 (119/59 - 155/69)  RR: 15 (10 - 29)  SpO2: 96% (93% - 99%)  Wt(kg): --  Height (cm): 162.6 (06-15-17 @ 23:05)  Weight (kg): 60.6 (06-15-17 @ 23:05)  BMI (kg/m2): 22.9 (06-15-17 @ 23:05)  BSA (m2): 1.65 (06-15-17 @ 23:05)      I & Os for current day (as of 06-17-17 @ 08:40)  =============================================  IN: 1225.5 ml / OUT: 0 ml / NET: 1225.5 ml    Physical Exam:  		    	Gen: alert oriented place person and date   	Pulm: Decreased breath sounds b/l bases no rales or ronchi  	CV: RRR, S1/S2  	Abd: +BS, soft, nontender/nondistended  	Extremity: No cyanosis, no edema no clubbing  	Neuro: No focal deficits  	    LABS/STUDIES  --------------------------------------------------------------------------------              8.2    7.6   >-----------<  276      [06-17-17 @ 02:48]              24.6     139  |  92  |  53  ----------------------------<  71      [06-17-17 @ 02:48]  4.6   |  28  |  7.32        Ca     8.1     [06-17-17 @ 02:48]      Mg     2.5     [06-17-17 @ 02:48]      Phos  4.2     [06-17-17 @ 02:48]    TPro  6.8  /  Alb  4.0  /  TBili  0.3  /  DBili  x   /  AST  33  /  ALT  20  /  AlkPhos  278  [06-16-17 @ 15:48]    PT/INR: PT 12.5 , INR 1.14       [06-17-17 @ 02:48]  PTT: 28.1       [06-17-17 @ 02:48]    Troponin 0.80      [06-17-17 @ 04:41]        [06-17-17 @ 04:41]            imp/suggest: ESRD      Hemodialysis Prescription:  	Access: avf  	Dialyzer: revaclear 400  	Blood Flow (mL/Min): 400  	Dialysate Flow (mL/Min): 600  	Target UF (Liters):2000cc  	Treatment Time:210  	Potassium:2.0  	Calcium: 2.5  	  YOLANDA epogen 90397 units for anemia

## 2017-06-17 NOTE — PROGRESS NOTE ADULT - SUBJECTIVE AND OBJECTIVE BOX
Chief Complaint: Brittle T1DM    History: Overnight pt had hypoglycemia. Asymptomatic. Started eating last evening.  Endocrine History: T1DM diagnosed 40 years ago with neuropathy, retinopathy, ESRD on HD, CAD, CVA, PAD s/p bypass, recently had LLE angioplasty and was discharged to home. Returned to ED with dyspnea during HD and diagnosed with acute DKA. Has been on Medtronic Pump for 3 years and home settings are:    Total Basal -- 10.175 units  12am-0.35  3:30am-0.50  6am-0.425  ICR  12a-9  11a-10  3p-9  ISF  12a-60  7a-40  6p-60  -130      MEDICATIONS  (STANDING):  dextrose 5%. 1000milliLiter(s) IV Continuous <Continuous>  dextrose 50% Injectable 12.5Gram(s) IV Push once  dextrose 50% Injectable 25Gram(s) IV Push once  dextrose 50% Injectable 25Gram(s) IV Push once  heparin  Injectable 5000Unit(s) SubCutaneous every 12 hours  aspirin enteric coated 81milliGRAM(s) Oral daily  lisinopril 40milliGRAM(s) Oral daily  DULoxetine 60milliGRAM(s) Oral daily  atorvastatin 20milliGRAM(s) Oral at bedtime  clopidogrel Tablet 75milliGRAM(s) Oral at bedtime  metoprolol succinate ER 50milliGRAM(s) Oral daily  furosemide    Tablet 40milliGRAM(s) Oral <User Schedule>  cinacalcet 60milliGRAM(s) Oral daily  sevelamer hydrochloride 2400milliGRAM(s) Oral three times a day with meals  hydrALAZINE 100milliGRAM(s) Oral every 8 hours  insulin lispro (HumaLOG) Pump 1Each SubCutaneous Continuous Pump    MEDICATIONS  (PRN):  dextrose Gel 1Dose(s) Oral once PRN Blood Glucose LESS THAN 70 milliGRAM(s)/deciLiter  glucagon  Injectable 1milliGRAM(s) IntraMuscular once PRN Glucose <70 milliGRAM(s)/deciLiter  oxyCODONE IR 5milliGRAM(s) Oral every 6 hours PRN Moderate Pain (4 - 6)      Allergies    No Known Allergies    Intolerances      Review of Systems:  Constitutional: No fever  Eyes: No blurry vision  Neuro: No tremors  HEENT: No pain  Cardiovascular: No chest pain, palpitations  Respiratory: No SOB, no cough  GI: No nausea, vomiting, abdominal pain  : No dysuria  Skin: no rash  Psych: no depression  Endocrine: no polyuria, polydipsia  Hem/lymph: no swelling  Osteoporosis: no fractures    ALL OTHER SYSTEMS REVIEWED AND NEGATIVE    PHYSICAL EXAM:  VITALS: T(C): 36.8  T(F): 98.3, Max: 98.8 (06-16 @ 19:00)  HR: 74 (63 - 78)  BP: 152/72 (119/59 - 167/73)  RR:  (10 - 30)  SpO2:  (93% - 100%)  Wt(kg): --  GENERAL: NAD, well-groomed, well-developed  EYES: No proptosis, no lid lag, anicteric  HEENT:  Atraumatic, Normocephalic, moist mucous membranes  THYROID: Normal size, no palpable nodules  RESPIRATORY: Clear to auscultation bilaterally; No rales, rhonchi, wheezing, or rubs  CARDIOVASCULAR: Regular rate and rhythm; No murmurs; no peripheral edema  GI: Soft, nontender, non distended, normal bowel sounds  SKIN: Dry, intact, No rashes or lesions  MUSCULOSKELETAL: Full range of motion, normal strength  NEURO: sensation intact, extraocular movements intact, no tremor, normal reflexes  PSYCH: Alert and oriented x 3, normal affect, normal mood  CUSHING'S SIGNS: no striae    CAPILLARY BLOOD GLUCOSE  129 (06-17 @ 13:00)  99 (06-17 @ 08:00)  79 (06-17 @ 06:00)  89 (06-17 @ 04:00)  68 (06-17 @ 03:00)  87 (06-17 @ 00:30)  70 (06-17 @ 00:00)  95 (06-16 @ 23:00)  131 (06-16 @ 22:00)  120 (06-16 @ 21:11)  120 (06-16 @ 21:00)  146 (06-16 @ 20:00)  141 (06-16 @ 19:00)  156 (06-16 @ 18:00)  126 (06-16 @ 17:00)  78 (06-16 @ 16:00)  80 (06-16 @ 15:00)  96 (06-16 @ 14:00)  118 (06-16 @ 13:00)  160 (06-16 @ 12:00)  194 (06-16 @ 11:00)  270 (06-16 @ 10:00)  369 (06-16 @ 09:00)  404 (06-16 @ 08:00)  465 (06-16 @ 07:00)  514 (06-16 @ 06:00)  556 (06-16 @ 05:00)  595 (06-15 @ 21:53)  445 (06-15 @ 15:53)      06-17    139  |  92<L>  |  53<H>  ----------------------------<  71  4.6   |  28  |  7.32<H>    EGFR if : 6<L>  EGFR if non : 6<L>    Ca    8.1<L>      06-17  Mg     2.5     06-17  Phos  4.2     06-17    TPro  6.8  /  Alb  4.0  /  TBili  0.3  /  DBili  x   /  AST  33  /  ALT  20  /  AlkPhos  278<H>  06-16          Thyroid Function Tests:      Hemoglobin A1C, Whole Blood: 7.3 % <H> [4.0 - 5.6] (06-16 @ 04:45)  Hemoglobin A1C, Whole Blood: 8.5 % <H> [4.0 - 5.6] (04-11 @ 07:12)

## 2017-06-17 NOTE — CONSULT NOTE ADULT - ASSESSMENT
61F w/ PAD and multiple medical comorbidities (DM, ESRD, CAD) s/p LLE common-fem bypass w/ recent balloon angioplasty for venous graft stenosis   LLE w/ strong dopplerable signals   - No acute vascular surgical intervention is indicated at this time.   - Discussed w/ the vascular surgery fellow, Dr Bates.     #[phone]

## 2017-06-17 NOTE — PROGRESS NOTE ADULT - ASSESSMENT
61 f with multiple medical problems and chest pain  Admit telemetry  cardiac enzymes  cardiology vladimir noted Dr. Vela No further work up.  ESRD continue HD Dr. Brayan gilbert noted  IDDM type 1/ DKA  Endocrine evaluation and ICU care  left leg pain possible recurrent celullitis? Vascular evaluation Dr. Tanner

## 2017-06-17 NOTE — CHART NOTE - NSCHARTNOTEFT_GEN_A_CORE
MICU Transfer Note    Transfer from: MICU    Transfer to: ( x ) Medicine    (  ) Telemetry     (   ) RCU        (    ) Palliative         (   ) Stroke Unit          (   ) __________________    Accepting xanderan: Aylin      MICU COURSE:  off insulin gtt since early evening of 6/16 and on home insulin pump, had 1 episode of asymptomatic hypogylcemia tx with orally, pump adjusted basal rate on pump. Pt chronically with CP being followed by cards, they do not believe ACS a factor and will stop trending trops. HD today. chronic left LE swelling seen by vascular- no acute interventions outpatient Follow up, no signs of infection.         ASSESSMENT & PLAN:   61 f with DM on insulin pump, CAD, PAD, HTN, HLD, chronic pain admitted to hospital for CP work up and transfer to MICU for DKA    DKA  -resolved, continue home insulin pump   - Follow up endo recs    ESRD  - T/Th/Sat  - Follow up renal recs    CP/CAD  - cards with low suspicion ACS  - off tele and stop trending trop    Lt LE swelling/pain  - chronic swelling likely from h/o vascular sx, no acute infection, normal pulses   - outpatient vascular Follow up, compression socks      For Followup:          Vital Signs Last 24 Hrs  T(C): 36.8, Max: 37.1 (06-16 @ 16:00)  T(F): 98.3, Max: 98.8 (06-16 @ 19:00)  HR: 72 (63 - 78)  BP: 164/72 (119/59 - 167/73)  BP(mean): 103 (82 - 110)  RR: 16 (10 - 30)  SpO2: 97% (93% - 100%)  I&O's Summary    I & Os for current day (as of 17 Jun 2017 15:51)  =============================================  IN: 1225.5 ml / OUT: 0 ml / NET: 1225.5 ml      MEDICATIONS  (STANDING):  dextrose 5%. 1000milliLiter(s) IV Continuous <Continuous>  dextrose 50% Injectable 12.5Gram(s) IV Push once  dextrose 50% Injectable 25Gram(s) IV Push once  dextrose 50% Injectable 25Gram(s) IV Push once  heparin  Injectable 5000Unit(s) SubCutaneous every 12 hours  aspirin enteric coated 81milliGRAM(s) Oral daily  lisinopril 40milliGRAM(s) Oral daily  DULoxetine 60milliGRAM(s) Oral daily  atorvastatin 20milliGRAM(s) Oral at bedtime  clopidogrel Tablet 75milliGRAM(s) Oral at bedtime  metoprolol succinate ER 50milliGRAM(s) Oral daily  furosemide    Tablet 40milliGRAM(s) Oral <User Schedule>  cinacalcet 60milliGRAM(s) Oral daily  sevelamer hydrochloride 2400milliGRAM(s) Oral three times a day with meals  hydrALAZINE 100milliGRAM(s) Oral every 8 hours  insulin lispro (HumaLOG) Pump 1Each SubCutaneous Continuous Pump    MEDICATIONS  (PRN):  dextrose Gel 1Dose(s) Oral once PRN Blood Glucose LESS THAN 70 milliGRAM(s)/deciLiter  glucagon  Injectable 1milliGRAM(s) IntraMuscular once PRN Glucose <70 milliGRAM(s)/deciLiter  oxyCODONE IR 5milliGRAM(s) Oral every 6 hours PRN Moderate Pain (4 - 6)        LABS                                            8.2                   Neurophils% (auto):   72.2   (06-17 @ 02:48):    7.6  )-----------(276          Lymphocytes% (auto):  17.9                                          24.6                   Eosinphils% (auto):   1.0      Manual%: Neutrophils x    ; Lymphocytes x    ; Eosinophils x    ; Bands%: x    ; Blasts x                                    139    |  92     |  53                  Calcium: 8.1   / iCa: x      (06-17 @ 02:48)    ----------------------------<  71        Magnesium: 2.5                              4.6     |  28     |  7.32             Phosphorous: 4.2        ( 06-17 @ 02:48 )   PT: 12.5 sec;   INR: 1.14 ratio  aPTT: 28.1 sec

## 2017-06-17 NOTE — PROGRESS NOTE ADULT - SUBJECTIVE AND OBJECTIVE BOX
CC: No CP/SOB    TELEMETRY: NSR    PHYSICAL EXAM:    T(C): 36.8, Max: 37.1 (06-16 @ 16:00)  HR: 66 (63 - 78)  BP: 123/57 (119/59 - 167/73)  RR: 23 (10 - 29)  SpO2: 96% (93% - 100%)  Wt(kg): --  I&O's Summary    I & Os for current day (as of 17 Jun 2017 13:21)  =============================================  IN: 1225.5 ml / OUT: 0 ml / NET: 1225.5 ml      Appearance: Normal	  Cardiovascular: Normal S1 S2,RRR, No JVD, No murmurs  Respiratory: Lungs clear to auscultation	  Gastrointestinal:  Soft, Non-tender, + BS	  Extremities: Normal range of motion, No clubbing, cyanosis or edema      LABS:	 	                          8.2    7.6   )-----------( 276      ( 17 Jun 2017 02:48 )             24.6     06-17    139  |  92<L>  |  53<H>  ----------------------------<  71  4.6   |  28  |  7.32<H>    Ca    8.1<L>      17 Jun 2017 02:48  Phos  4.2     06-17  Mg     2.5     06-17    TPro  6.8  /  Alb  4.0  /  TBili  0.3  /  DBili  x   /  AST  33  /  ALT  20  /  AlkPhos  278<H>  06-16      PT/INR - ( 17 Jun 2017 02:48 )   PT: 12.5 sec;   INR: 1.14 ratio         PTT - ( 17 Jun 2017 02:48 )  PTT:28.1 sec    CARDIAC MARKERS:

## 2017-06-17 NOTE — PROGRESS NOTE ADULT - PROBLEM SELECTOR PLAN 1
- off insulin gtt x10 hours  - tolerating PO  - remaining gap likely from underlying renal disease and potential starvation ketosis- stable now  - acetone negative  - continue insulin pump, Follow up endocrine for adjustment basal rate with 2 episodes hyperglycemia

## 2017-06-17 NOTE — PROGRESS NOTE ADULT - ASSESSMENT
Atypical CP  DKA  CAD/DM  ESRD/PAD    -CV stable  -No ACS  -Cont current medical mgmt  -DKA mgmt per MICU  -No ischemia eval at this time

## 2017-06-17 NOTE — PROGRESS NOTE ADULT - ASSESSMENT
61 f with DM on insulin pump, CAD, PAD, HTN, HLD, chronic pain admitted to hospital for CP work up and transfer to MICU for DKA

## 2017-06-17 NOTE — CONSULT NOTE ADULT - SUBJECTIVE AND OBJECTIVE BOX
CC: Patient is a 61y old  Female who presents with a chief complaint of pain  and swelling in left leg (15 Christian 2017 20:50)    HPI:  60yo p/w shortness of breath and palpitations during dialysis. Admitted for DKA and several medical comorbidities.   The patient has a history of severe peripheral vascular disease with multiple revascularization procedures --> s/p LLE bypass (fem-pop) in May 2016 with several postoperative angioplasty procedures.   Reportedly, the patient had a left lower extremity balloon angioplasty (6/9/17).     REVIEW OF SYSTEMS  Negative unless mentioned otherwise.     PAST MEDICAL & SURGICAL HISTORY:  Subclavian vein stenosis, left: s/p stent  Cellulitis: 2015 left foot  DKA, type 1: 1/2015  ACS (acute coronary syndrome): 1/2015 - cath revealed 100% ostial stenosis not amenable to PCI - medical management  MI (myocardial infarction): 3/2015 - s/p cardiac cath - no intervention, medical management  MI, old  TIA (transient ischemic attack): x 2 - 8-9 years ago prior to ASD/VSD repair  PAD (peripheral artery disease)  HLD (hyperlipidemia)  HTN (hypertension)  ESRD (end stage renal disease) on dialysis: T/TH/SAT  Type 1 diabetes mellitus  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  CAD (coronary artery disease): s/p stents  Myocardial infarct  Murmur, cardiac  Gout: past  CVA (cerebral infarction): with no residual, 8 yrs ago, prior to heart surgery - ST memory loss  Peripheral vascular disease: occluded left fem-pop bypass 5/2015  Coronary artery disease  Diabetes mellitus type 1: Insulin Dependent - Medtronic  Minimed Paradigm Insulin Pump - Novolog  ESRD (end stage renal disease): dialysis , tue, thursday, saturday  TIA (transient ischemic attack)  x 3 2005 2 nt to VSD/ASD repair  Diabetes mellitus type II  HTN (hypertension), benign  Hyperlipidemia  Status post device closure of ASD: &quot;clamshell&quot;  History of cardiac catheterization: 1/2015 - no intervention  S/P cholecystectomy  AV fistula: Left UE  S/P femoral-popliteal bypass surgery: L and R in 2013 with graft; 5/2015 CFA angioplasty left and ileofemoral endarterectomywith vein patch angioplasty of left fem-pop bypass graft  Multiple vascular surgery both leg, left fempop bypass revision 11/2015  S/P femoral-popliteal bypass surgery: right  march 2013  Stented coronary artery: Metal plug to artery  AV (arteriovenous fistula): Left AV graft; revision with stent placement 2-3 years ago  S/P cholecystectomy  ASD OR VSD  Repair 2005: mesh      MEDS  MEDICATIONS  (STANDING):  dextrose 5%. 1000milliLiter(s) IV Continuous <Continuous>  dextrose 50% Injectable 12.5Gram(s) IV Push once  dextrose 50% Injectable 25Gram(s) IV Push once  dextrose 50% Injectable 25Gram(s) IV Push once  heparin  Injectable 5000Unit(s) SubCutaneous every 12 hours  aspirin enteric coated 81milliGRAM(s) Oral daily  lisinopril 40milliGRAM(s) Oral daily  DULoxetine 60milliGRAM(s) Oral daily  atorvastatin 20milliGRAM(s) Oral at bedtime  clopidogrel Tablet 75milliGRAM(s) Oral at bedtime  metoprolol succinate ER 50milliGRAM(s) Oral daily  furosemide    Tablet 40milliGRAM(s) Oral <User Schedule>  cinacalcet 60milliGRAM(s) Oral daily  sevelamer hydrochloride 2400milliGRAM(s) Oral three times a day with meals  hydrALAZINE 100milliGRAM(s) Oral every 8 hours  insulin lispro (HumaLOG) Pump 1Each SubCutaneous Continuous Pump    MEDICATIONS  (PRN):  dextrose Gel 1Dose(s) Oral once PRN Blood Glucose LESS THAN 70 milliGRAM(s)/deciLiter  glucagon  Injectable 1milliGRAM(s) IntraMuscular once PRN Glucose <70 milliGRAM(s)/deciLiter  oxyCODONE IR 5milliGRAM(s) Oral every 6 hours PRN Moderate Pain (4 - 6)    Allergies  No Known Allergies      Vital Signs Last 24 Hrs  T(C): 36.8, Max: 37.1 (06-16 @ 12:00)  T(F): 98.3, Max: 98.8 (06-16 @ 12:00)  HR: 66 (63 - 78)  BP: 166/77 (119/59 - 166/77)  BP(mean): 110 (85 - 110)  RR: 16 (10 - 29)  SpO2: 99% (93% - 100%)  I&O's Summary    I & Os for current day (as of 17 Jun 2017 11:00)  =============================================  IN: 1225.5 ml / OUT: 0 ml / NET: 1225.5 ml      Physical Exam  General: A&Ox3, NAD.  Neuro: CN II-XII intact, motor and sensory - grossly intact w/ no focal deficits.  Extremities:   Warm bilaterally   LLE swelling > R  Soft compartments  Pulse exam:   Palpable femoral pulses bilaterally  Right --> Dopplerable AT/PT  Left --> Dopplerable DP/PT    Labs:                        8.2    7.6   )-----------( 276      ( 17 Jun 2017 02:48 )             24.6     06-17    139  |  92<L>  |  53<H>  ----------------------------<  71  4.6   |  28  |  7.32<H>    Ca    8.1<L>      17 Jun 2017 02:48  Phos  4.2     06-17  Mg     2.5     06-17    TPro  6.8  /  Alb  4.0  /  TBili  0.3  /  DBili  x   /  AST  33  /  ALT  20  /  AlkPhos  278<H>  06-16  PT/INR - ( 17 Jun 2017 02:48 )   PT: 12.5 sec;   INR: 1.14 ratio    PTT - ( 17 Jun 2017 02:48 )  PTT:28.1 sec    Imaging    Arterial duplex 6/7  L common fem bypass vein graft stenosis (proximal and mid-thigh -> 2 areas of stenosis / high velocity)   Venous duplex 6/3   Negative for DVT

## 2017-06-17 NOTE — PROGRESS NOTE ADULT - SUBJECTIVE AND OBJECTIVE BOX
CHIEF COMPLAINT: CP, admitted to MICU for DKA    Interval Events: off insulin gtt x 10hours, eating, on insulin pump with 2 episodes of asymptomatic hypoglycemia tx with oral sugars, HD today, no CP/SOB    REVIEW OF SYSTEMS:  Constitutional: [ -] fevers [ ] chills [ ] weight loss [ ] weight gain  HEENT: [ ] dry eyes [- ] eye irritation [ ] postnasal drip [ ] nasal congestion  CV: [- ] chest pain [ ] orthopnea [ ] palpitations [ ] murmur  Resp: [- ] cough [- ] shortness of breath [ ] dyspnea [ ] wheezing [ ] sputum [ ] hemoptysis  GI: [ ] nausea [ ] vomiting [ ] diarrhea [ ] constipation [ -] abd pain [ ] dysphagia   : [- ] dysuria [ ] nocturia [ ] hematuria [ ] increased urinary frequency  Musculoskeletal: [ -] back pain [ ] myalgias [ +] arthralgias [ ] fracture  Skin: [- ] rash [ ] itch  Neurological: [ -] headache [ ] dizziness [ ] syncope [ ] weakness [ ] numbness  Psychiatric: [- ] anxiety [ ] depression  Endocrine: [ +] diabetes [ ] thyroid problem  Hematologic/Lymphatic: [ ] anemia [- ] bleeding problem  Allergic/Immunologic: [ ] itchy eyes [- ] nasal discharge [ ] hives [ ] angioedema  [ +] All other systems negative  [ ] Unable to assess ROS because ________    OBJECTIVE:  ICU Vital Signs Last 24 Hrs  T(C): 36.7, Max: 37.1 (06-16 @ 12:00)  T(F): 98.1, Max: 98.8 (06-16 @ 12:00)  HR: 65 (63 - 90)  BP: 154/67 (119/59 - 155/69)  BP(mean): 96 (83 - 104)  ABP: --  ABP(mean): --  RR: 16 (10 - 29)  SpO2: 95% (93% - 100%)        I & Os for current day (as of 06-17 @ 07:17)  =============================================  IN: 1225.5 ml / OUT: 0 ml / NET: 1225.5 ml    CAPILLARY BLOOD GLUCOSE  79 (17 Jun 2017 06:00)      PHYSICAL EXAM:  General: AOX3, NAD  HEENT: wnl  Lymph Nodes: not appreciated  Neck: wnl  Respiratory: no respiratory distress, CTA b/l  Cardiovascular: RRR, no murmurs, normal perfusion  Abdomen: soft, NTND  Extremities: +1 pitting edema lt LE w/o deformity  Skin: no rash to LE, no wounds or increased warmth  Neurological: no focal deficits  Psychiatry: normal affect    LINES:    HOSPITAL MEDICATIONS:  MEDICATIONS  (STANDING):  heparin  Injectable 5000Unit(s) SubCutaneous every 12 hours  aspirin enteric coated 81milliGRAM(s) Oral daily  lisinopril 40milliGRAM(s) Oral daily  DULoxetine 60milliGRAM(s) Oral daily  atorvastatin 20milliGRAM(s) Oral at bedtime  clopidogrel Tablet 75milliGRAM(s) Oral at bedtime  metoprolol succinate ER 50milliGRAM(s) Oral daily  furosemide    Tablet 40milliGRAM(s) Oral <User Schedule>  cinacalcet 60milliGRAM(s) Oral daily  sevelamer hydrochloride 2400milliGRAM(s) Oral three times a day with meals  hydrALAZINE 100milliGRAM(s) Oral every 8 hours  insulin lispro (HumaLOG) Pump 1Each SubCutaneous Continuous Pump    MEDICATIONS  (PRN):  dextrose Gel 1Dose(s) Oral once PRN Blood Glucose LESS THAN 70 milliGRAM(s)/deciLiter  glucagon  Injectable 1milliGRAM(s) IntraMuscular once PRN Glucose <70 milliGRAM(s)/deciLiter  oxyCODONE IR 5milliGRAM(s) Oral every 6 hours PRN Moderate Pain (4 - 6)      LABS:                        8.2    7.6   )-----------( 276      ( 17 Jun 2017 02:48 )             24.6     06-17    139  |  92<L>  |  53<H>  ----------------------------<  71  4.6   |  28  |  7.32<H>    Ca    8.1<L>      17 Jun 2017 02:48  Phos  4.2     06-17  Mg     2.5     06-17    TPro  6.8  /  Alb  4.0  /  TBili  0.3  /  DBili  x   /  AST  33  /  ALT  20  /  AlkPhos  278<H>  06-16    PT/INR - ( 17 Jun 2017 02:48 )   PT: 12.5 sec;   INR: 1.14 ratio    PTT - ( 17 Jun 2017 02:48 )  PTT:28.1 sec    ACETONE- negative     MICROBIOLOGY:   none sent     RADIOLOGY:  [x ] Reviewed and interpreted by me- no new studies              Kerri Quan DO  [phone]

## 2017-06-17 NOTE — PROGRESS NOTE ADULT - PROBLEM SELECTOR PLAN 1
- overnight had hypoglycemia  - decrease basal rate 12am from 0.35 to 0.3  - decrease basal rate 3am from 0.5 to 0.45  - continue rest of insulin pump settings

## 2017-06-17 NOTE — PROGRESS NOTE ADULT - SUBJECTIVE AND OBJECTIVE BOX
Patient is a 61y old  Female who presents with a chief complaint of pain  and swelling in left leg (15 Christian 2017 20:50)      SUBJECTIVE / OVERNIGHT EVENTS: comfortable, feels better. c/o L leg pain  Review of Systems  chest pain no  palpitations no  sob no  nausea no  headache no    MEDICATIONS  (STANDING):  dextrose 5%. 1000milliLiter(s) IV Continuous <Continuous>  dextrose 50% Injectable 12.5Gram(s) IV Push once  dextrose 50% Injectable 25Gram(s) IV Push once  dextrose 50% Injectable 25Gram(s) IV Push once  heparin  Injectable 5000Unit(s) SubCutaneous every 12 hours  aspirin enteric coated 81milliGRAM(s) Oral daily  lisinopril 40milliGRAM(s) Oral daily  DULoxetine 60milliGRAM(s) Oral daily  atorvastatin 20milliGRAM(s) Oral at bedtime  clopidogrel Tablet 75milliGRAM(s) Oral at bedtime  metoprolol succinate ER 50milliGRAM(s) Oral daily  furosemide    Tablet 40milliGRAM(s) Oral <User Schedule>  cinacalcet 60milliGRAM(s) Oral daily  sevelamer hydrochloride 2400milliGRAM(s) Oral three times a day with meals  hydrALAZINE 100milliGRAM(s) Oral every 8 hours  insulin lispro (HumaLOG) Pump 1Each SubCutaneous Continuous Pump    MEDICATIONS  (PRN):  dextrose Gel 1Dose(s) Oral once PRN Blood Glucose LESS THAN 70 milliGRAM(s)/deciLiter  glucagon  Injectable 1milliGRAM(s) IntraMuscular once PRN Glucose <70 milliGRAM(s)/deciLiter  oxyCODONE IR 5milliGRAM(s) Oral every 6 hours PRN Moderate Pain (4 - 6)      Vital Signs Last 24 Hrs  T(C): 36.8, Max: 37.1 (06-16 @ 12:00)  HR: 73 (63 - 78)  BP: 156/67 (119/59 - 164/73)  RR: 17 (10 - 29)  SpO2: 97% (93% - 100%)  Wt(kg): --  CAPILLARY BLOOD GLUCOSE  99 (17 Jun 2017 08:00)  79 (17 Jun 2017 06:00)  89 (17 Jun 2017 04:00)  68 (17 Jun 2017 03:00)  87 (17 Jun 2017 00:30)  70 (17 Jun 2017 00:00)  95 (16 Jun 2017 23:00)  131 (16 Jun 2017 22:00)  120 (16 Jun 2017 21:11)  120 (16 Jun 2017 21:00)  146 (16 Jun 2017 20:00)  141 (16 Jun 2017 19:00)  156 (16 Jun 2017 18:00)  126 (16 Jun 2017 17:00)  78 (16 Jun 2017 16:00)  80 (16 Jun 2017 15:00)  96 (16 Jun 2017 14:00)  118 (16 Jun 2017 13:00)  160 (16 Jun 2017 12:00)  194 (16 Jun 2017 11:00)    I&O's Summary    I & Os for current day (as of 17 Jun 2017 10:44)  =============================================  IN: 1225.5 ml / OUT: 0 ml / NET: 1225.5 ml      PHYSICAL EXAM:  GENERAL: NAD, well-developed  HEAD:  Atraumatic, Normocephalic  EYES: EOMI, PERRLA, conjunctiva and sclera clear  NECK: Supple, No JVD  CHEST/LUNG: Clear to auscultation bilaterally; No wheeze  HEART: Regular rate and rhythm; No murmurs, rubs, or gallops  ABDOMEN: Soft, Nontender, Nondistended; Bowel sounds present  EXTREMITIES:  2+ Peripheral Pulses, No clubbing, cyanosis,2+ edema L leg  PSYCH: AAOx3  NEUROLOGY: non-focal  SKIN: No rashes or lesions    LABS:                        8.2    7.6   )-----------( 276      ( 17 Jun 2017 02:48 )             24.6     06-17    139  |  92<L>  |  53<H>  ----------------------------<  71  4.6   |  28  |  7.32<H>    Ca    8.1<L>      17 Jun 2017 02:48  Phos  4.2     06-17  Mg     2.5     06-17    TPro  6.8  /  Alb  4.0  /  TBili  0.3  /  DBili  x   /  AST  33  /  ALT  20  /  AlkPhos  278<H>  06-16    PT/INR - ( 17 Jun 2017 02:48 )   PT: 12.5 sec;   INR: 1.14 ratio         PTT - ( 17 Jun 2017 02:48 )  PTT:28.1 sec  CARDIAC MARKERS ( 17 Jun 2017 04:41 )  x     / 0.80 ng/mL / 234 U/L / x     / 7.7 ng/mL  CARDIAC MARKERS ( 17 Jun 2017 02:48 )  x     / 0.87 ng/mL / x     / x     / x      CARDIAC MARKERS ( 15 Christian 2017 20:45 )  x     / 0.13 ng/mL / 234 U/L / x     / 4.8 ng/mL  CARDIAC MARKERS ( 15 Christian 2017 16:41 )  x     / 0.10 ng/mL / 240 U/L / x     / 3.6 ng/mL          RADIOLOGY & ADDITIONAL TESTS:    Imaging Personally Reviewed:    Consultant(s) Notes Reviewed:      Care Discussed with Consultants/Other Providers:

## 2017-06-18 LAB
ANION GAP SERPL CALC-SCNC: 12 MMOL/L — SIGNIFICANT CHANGE UP (ref 5–17)
APTT BLD: 28.9 SEC — SIGNIFICANT CHANGE UP (ref 27.5–37.4)
BASOPHILS # BLD AUTO: 0 K/UL — SIGNIFICANT CHANGE UP (ref 0–0.2)
BASOPHILS NFR BLD AUTO: 0.3 % — SIGNIFICANT CHANGE UP (ref 0–2)
BUN SERPL-MCNC: 19 MG/DL — SIGNIFICANT CHANGE UP (ref 7–23)
CALCIUM SERPL-MCNC: 7.9 MG/DL — LOW (ref 8.4–10.5)
CHLORIDE SERPL-SCNC: 99 MMOL/L — SIGNIFICANT CHANGE UP (ref 96–108)
CO2 SERPL-SCNC: 30 MMOL/L — SIGNIFICANT CHANGE UP (ref 22–31)
CREAT SERPL-MCNC: 4.25 MG/DL — HIGH (ref 0.5–1.3)
EOSINOPHIL # BLD AUTO: 0 K/UL — SIGNIFICANT CHANGE UP (ref 0–0.5)
EOSINOPHIL NFR BLD AUTO: 0.8 % — SIGNIFICANT CHANGE UP (ref 0–6)
GLUCOSE SERPL-MCNC: 147 MG/DL — HIGH (ref 70–99)
HCT VFR BLD CALC: 27 % — LOW (ref 34.5–45)
HGB BLD-MCNC: 9.1 G/DL — LOW (ref 11.5–15.5)
LYMPHOCYTES # BLD AUTO: 0.6 K/UL — LOW (ref 1–3.3)
LYMPHOCYTES # BLD AUTO: 13.2 % — SIGNIFICANT CHANGE UP (ref 13–44)
MAGNESIUM SERPL-MCNC: 2.3 MG/DL — SIGNIFICANT CHANGE UP (ref 1.6–2.6)
MCHC RBC-ENTMCNC: 33.8 GM/DL — SIGNIFICANT CHANGE UP (ref 32–36)
MCHC RBC-ENTMCNC: 34.9 PG — HIGH (ref 27–34)
MCV RBC AUTO: 103 FL — HIGH (ref 80–100)
MONOCYTES # BLD AUTO: 0.5 K/UL — SIGNIFICANT CHANGE UP (ref 0–0.9)
MONOCYTES NFR BLD AUTO: 11.1 % — SIGNIFICANT CHANGE UP (ref 2–14)
NEUTROPHILS # BLD AUTO: 3.6 K/UL — SIGNIFICANT CHANGE UP (ref 1.8–7.4)
NEUTROPHILS NFR BLD AUTO: 74.5 % — SIGNIFICANT CHANGE UP (ref 43–77)
PHOSPHATE SERPL-MCNC: 2.5 MG/DL — SIGNIFICANT CHANGE UP (ref 2.5–4.5)
PLATELET # BLD AUTO: 272 K/UL — SIGNIFICANT CHANGE UP (ref 150–400)
POTASSIUM SERPL-MCNC: 4.6 MMOL/L — SIGNIFICANT CHANGE UP (ref 3.5–5.3)
POTASSIUM SERPL-SCNC: 4.6 MMOL/L — SIGNIFICANT CHANGE UP (ref 3.5–5.3)
RBC # BLD: 2.62 M/UL — LOW (ref 3.8–5.2)
RBC # FLD: 13.4 % — SIGNIFICANT CHANGE UP (ref 10.3–14.5)
SODIUM SERPL-SCNC: 141 MMOL/L — SIGNIFICANT CHANGE UP (ref 135–145)
WBC # BLD: 4.8 K/UL — SIGNIFICANT CHANGE UP (ref 3.8–10.5)
WBC # FLD AUTO: 4.8 K/UL — SIGNIFICANT CHANGE UP (ref 3.8–10.5)

## 2017-06-18 RX ADMIN — Medication 50 MILLIGRAM(S): at 06:04

## 2017-06-18 RX ADMIN — SEVELAMER CARBONATE 2400 MILLIGRAM(S): 2400 POWDER, FOR SUSPENSION ORAL at 09:55

## 2017-06-18 RX ADMIN — LISINOPRIL 40 MILLIGRAM(S): 2.5 TABLET ORAL at 13:08

## 2017-06-18 RX ADMIN — Medication 100 MILLIGRAM(S): at 06:04

## 2017-06-18 RX ADMIN — OXYCODONE HYDROCHLORIDE 5 MILLIGRAM(S): 5 TABLET ORAL at 11:21

## 2017-06-18 RX ADMIN — ATORVASTATIN CALCIUM 20 MILLIGRAM(S): 80 TABLET, FILM COATED ORAL at 22:37

## 2017-06-18 RX ADMIN — CINACALCET 60 MILLIGRAM(S): 30 TABLET, FILM COATED ORAL at 13:08

## 2017-06-18 RX ADMIN — SEVELAMER CARBONATE 2400 MILLIGRAM(S): 2400 POWDER, FOR SUSPENSION ORAL at 13:09

## 2017-06-18 RX ADMIN — Medication 100 MILLIGRAM(S): at 14:10

## 2017-06-18 RX ADMIN — SEVELAMER CARBONATE 2400 MILLIGRAM(S): 2400 POWDER, FOR SUSPENSION ORAL at 18:11

## 2017-06-18 RX ADMIN — Medication 100 MILLIGRAM(S): at 22:37

## 2017-06-18 RX ADMIN — OXYCODONE HYDROCHLORIDE 5 MILLIGRAM(S): 5 TABLET ORAL at 11:50

## 2017-06-18 RX ADMIN — Medication 81 MILLIGRAM(S): at 13:08

## 2017-06-18 RX ADMIN — OXYCODONE HYDROCHLORIDE 5 MILLIGRAM(S): 5 TABLET ORAL at 01:55

## 2017-06-18 RX ADMIN — CLOPIDOGREL BISULFATE 75 MILLIGRAM(S): 75 TABLET, FILM COATED ORAL at 22:37

## 2017-06-18 RX ADMIN — DULOXETINE HYDROCHLORIDE 60 MILLIGRAM(S): 30 CAPSULE, DELAYED RELEASE ORAL at 14:10

## 2017-06-18 RX ADMIN — OXYCODONE HYDROCHLORIDE 5 MILLIGRAM(S): 5 TABLET ORAL at 23:45

## 2017-06-18 RX ADMIN — OXYCODONE HYDROCHLORIDE 5 MILLIGRAM(S): 5 TABLET ORAL at 23:04

## 2017-06-18 RX ADMIN — OXYCODONE HYDROCHLORIDE 5 MILLIGRAM(S): 5 TABLET ORAL at 02:25

## 2017-06-18 NOTE — PROGRESS NOTE ADULT - SUBJECTIVE AND OBJECTIVE BOX
MEDICINE ACCEPT NOTE    Patient is a 61y old  Female who presents with a chief complaint of pain  and swelling in left leg (15 Christian 2017 20:50)      SUBJECTIVE / OVERNIGHT EVENTS:  61 f with DM1 on insulin pump, CAD, PAD, HTN, HLD, ESRD on HD, chronic pain initially admitted for chest pain, found to be in DKA, both now resolved transferred from MICU.   Pt currently has no complaints and is feeling well. Chest pain and SOB resolved on admission. Sugars are now well controlled with insulin pump.     MEDICATIONS  (STANDING):  dextrose 5%. 1000milliLiter(s) IV Continuous <Continuous>  dextrose 50% Injectable 12.5Gram(s) IV Push once  dextrose 50% Injectable 25Gram(s) IV Push once  dextrose 50% Injectable 25Gram(s) IV Push once  heparin  Injectable 5000Unit(s) SubCutaneous every 12 hours  aspirin enteric coated 81milliGRAM(s) Oral daily  lisinopril 40milliGRAM(s) Oral daily  DULoxetine 60milliGRAM(s) Oral daily  atorvastatin 20milliGRAM(s) Oral at bedtime  clopidogrel Tablet 75milliGRAM(s) Oral at bedtime  metoprolol succinate ER 50milliGRAM(s) Oral daily  furosemide    Tablet 40milliGRAM(s) Oral <User Schedule>  cinacalcet 60milliGRAM(s) Oral daily  sevelamer hydrochloride 2400milliGRAM(s) Oral three times a day with meals  hydrALAZINE 100milliGRAM(s) Oral every 8 hours  insulin lispro (HumaLOG) Pump 1Each SubCutaneous Continuous Pump    MEDICATIONS  (PRN):  dextrose Gel 1Dose(s) Oral once PRN Blood Glucose LESS THAN 70 milliGRAM(s)/deciLiter  glucagon  Injectable 1milliGRAM(s) IntraMuscular once PRN Glucose <70 milliGRAM(s)/deciLiter  oxyCODONE IR 5milliGRAM(s) Oral every 6 hours PRN Moderate Pain (4 - 6)      Vital Signs Last 24 Hrs  T(C): 36.5, Max: 37.1 (06-17 @ 17:00)  HR: 69 (65 - 80)  BP: 174/81 (134/63 - 186/80)  RR: 22 (11 - 32)  SpO2: 98% (95% - 100%)  Wt(kg): --  CAPILLARY BLOOD GLUCOSE  244 (18 Jun 2017 12:49)  128 (18 Jun 2017 09:44)  113 (18 Jun 2017 08:00)  129 (18 Jun 2017 05:00)  130 (18 Jun 2017 01:00)  98 (17 Jun 2017 21:00)  130 (17 Jun 2017 17:00)    I&O's Summary  I & Os for 24h ending 18 Jun 2017 07:00  =============================================  IN: 150 ml / OUT: 50 ml / NET: 100 ml    I & Os for current day (as of 18 Jun 2017 14:22)  =============================================  IN: 240 ml / OUT: 0 ml / NET: 240 ml      PHYSICAL EXAM:  GENERAL: NAD  HEAD:  Atraumatic, Normocephalic  EYES: EOMI, PERRLA, conjunctiva and sclera clear  NECK: Supple, No JVD  CHEST/LUNG: Clear to auscultation bilaterally; No wheeze  HEART: Regular rate and rhythm; No murmurs, rubs, or gallops  ABDOMEN: Soft, Nontender, Nondistended; Bowel sounds present  EXTREMITIES:  LLE pitting edema, warm to touch. no edema in RLE.  PSYCH: AAOx3  NEUROLOGY: non-focal  SKIN: No rashes or lesions    LABS:                        9.1    4.8   )-----------( 272      ( 18 Jun 2017 02:36 )             27.0     06-18    141  |  99  |  19  ----------------------------<  147<H>  4.6   |  30  |  4.25<H>    Ca    7.9<L>      18 Jun 2017 02:36  Phos  2.5     06-18  Mg     2.3     06-18    TPro  6.8  /  Alb  4.0  /  TBili  0.3  /  DBili  x   /  AST  33  /  ALT  20  /  AlkPhos  278<H>  06-16    PT/INR - ( 17 Jun 2017 02:48 )   PT: 12.5 sec;   INR: 1.14 ratio         PTT - ( 18 Jun 2017 02:36 )  PTT:28.9 sec  CARDIAC MARKERS ( 17 Jun 2017 04:41 )  x     / 0.80 ng/mL / 234 U/L / x     / 7.7 ng/mL  CARDIAC MARKERS ( 17 Jun 2017 02:48 )  x     / 0.87 ng/mL / x     / x     / x              RADIOLOGY & ADDITIONAL TESTS:    Imaging Personally Reviewed:    Consultant(s) Notes Reviewed:      Care Discussed with Consultants/Other Providers:

## 2017-06-18 NOTE — PROGRESS NOTE ADULT - PROBLEM SELECTOR PLAN 2
No longer complaining of chest pain/sob  - continue aspirin/plavix  - cardiology signed off, repeat echo stable  - no further work up at this time

## 2017-06-18 NOTE — PROGRESS NOTE ADULT - PROBLEM SELECTOR PLAN 1
s/p MICU. Anion gap closed  sugars now well controlled on insulin pump.   f/u further endocrine recommendations.

## 2017-06-18 NOTE — PROGRESS NOTE ADULT - ASSESSMENT
61 f with DM on insulin pump, CAD, PAD, HTN, HLD, ESRD on HD, chronic pain initially presented with chest pain now resolved found tob e in DKA.

## 2017-06-18 NOTE — PROGRESS NOTE ADULT - PROBLEM SELECTOR PLAN 2
- continue aspirin/plavix  - cardiology signed off, repeat echo stable  - no further work up at this time

## 2017-06-18 NOTE — PROGRESS NOTE ADULT - ASSESSMENT
Atypical CP  DKA  CAD/DM  ESRD/PAD    -CV stable  -No ACS  -Cont current medical mgmt  -DKA resolved  -No ischemia eval at this time  -pt cleared for DC from CV perspective

## 2017-06-18 NOTE — PROGRESS NOTE ADULT - SUBJECTIVE AND OBJECTIVE BOX
CHIEF COMPLAINT: DKA, CP    Interval Events: no events    REVIEW OF SYSTEMS:  Constitutional: [- ] fevers [ ] chills [ ] weight loss [ ] weight gain  HEENT: [ ] dry eyes [- ] eye irritation [ ] postnasal drip [ ] nasal congestion  CV: [- ] chest pain [ ] orthopnea [ ] palpitations [ ] murmur  Resp: [ ] cough [- ] shortness of breath [ ] dyspnea [ ] wheezing [ ] sputum [ ] hemoptysis  GI: [ ] nausea [ ] vomiting [ ] diarrhea [ ] constipation [- ] abd pain [ ] dysphagia   : [ ] dysuria [ ] nocturia [ ] hematuria [ ] increased urinary frequency  Musculoskeletal: [ ] back pain [ ] myalgias [ ] arthralgias [ ] fracture  Skin: [ ] rash [ ] itch  Neurological: [ ] headache [ ] dizziness [ ] syncope [ ] weakness [ ] numbness  Psychiatric: [ ] anxiety [ ] depression  Endocrine: [ ] diabetes [ ] thyroid problem  Hematologic/Lymphatic: [ ] anemia [ ] bleeding problem  Allergic/Immunologic: [ ] itchy eyes [ ] nasal discharge [ ] hives [ ] angioedema  [ ] All other systems negative  [ ] Unable to assess ROS because ________    OBJECTIVE:  ICU Vital Signs Last 24 Hrs  T(C): 36.8, Max: 37.1 (06-17 @ 17:00)  T(F): 98.3, Max: 98.8 (06-17 @ 17:00)  HR: 68 (64 - 80)  BP: 160/67 (123/57 - 186/80)  BP(mean): 97 (82 - 117)  ABP: --  ABP(mean): --  RR: 17 (11 - 32)  SpO2: 95% (95% - 100%)        I & Os for current day (as of 06-18 @ 07:16)  =============================================  IN: 150 ml / OUT: 50 ml / NET: 100 ml    CAPILLARY BLOOD GLUCOSE  129 (18 Jun 2017 05:00)      PHYSICAL EXAM:  General:   HEENT:   Lymph Nodes:  Neck:   Respiratory:   Cardiovascular:   Abdomen:   Extremities:   Skin:   Neurological:  Psychiatry:    LINES:    HOSPITAL MEDICATIONS:  MEDICATIONS  (STANDING):  dextrose 5%. 1000milliLiter(s) IV Continuous <Continuous>  dextrose 50% Injectable 12.5Gram(s) IV Push once  dextrose 50% Injectable 25Gram(s) IV Push once  dextrose 50% Injectable 25Gram(s) IV Push once  heparin  Injectable 5000Unit(s) SubCutaneous every 12 hours  aspirin enteric coated 81milliGRAM(s) Oral daily  lisinopril 40milliGRAM(s) Oral daily  DULoxetine 60milliGRAM(s) Oral daily  atorvastatin 20milliGRAM(s) Oral at bedtime  clopidogrel Tablet 75milliGRAM(s) Oral at bedtime  metoprolol succinate ER 50milliGRAM(s) Oral daily  furosemide    Tablet 40milliGRAM(s) Oral <User Schedule>  cinacalcet 60milliGRAM(s) Oral daily  sevelamer hydrochloride 2400milliGRAM(s) Oral three times a day with meals  hydrALAZINE 100milliGRAM(s) Oral every 8 hours  insulin lispro (HumaLOG) Pump 1Each SubCutaneous Continuous Pump    MEDICATIONS  (PRN):  dextrose Gel 1Dose(s) Oral once PRN Blood Glucose LESS THAN 70 milliGRAM(s)/deciLiter  glucagon  Injectable 1milliGRAM(s) IntraMuscular once PRN Glucose <70 milliGRAM(s)/deciLiter  oxyCODONE IR 5milliGRAM(s) Oral every 6 hours PRN Moderate Pain (4 - 6)      LABS:                        9.1    4.8   )-----------( 272      ( 18 Jun 2017 02:36 )             27.0     06-18    141  |  99  |  19  ----------------------------<  147<H>  4.6   |  30  |  4.25<H>    Ca    7.9<L>      18 Jun 2017 02:36  Phos  2.5     06-18  Mg     2.3     06-18    TPro  6.8  /  Alb  4.0  /  TBili  0.3  /  DBili  x   /  AST  33  /  ALT  20  /  AlkPhos  278<H>  06-16    PT/INR - ( 17 Jun 2017 02:48 )   PT: 12.5 sec;   INR: 1.14 ratio         PTT - ( 18 Jun 2017 02:36 )  PTT:28.9 sec      Venous Blood Gas:  06-16 @ 15:37  7.45/49/53/33/87  VBG Lactate: --  Venous Blood Gas:  06-16 @ 11:28  7.40/53/30/32/50  VBG Lactate: --      MICROBIOLOGY:     RADIOLOGY:  [ ] Reviewed and interpreted by me    EKG:                Kerri Quan DO  [phone] CHIEF COMPLAINT: DKA, CP    Interval Events: no events    REVIEW OF SYSTEMS:  Constitutional: [- ] fevers [ ] chills [ ] weight loss [ ] weight gain  HEENT: [ ] dry eyes [- ] eye irritation [ ] postnasal drip [ ] nasal congestion  CV: [- ] chest pain [ ] orthopnea [ ] palpitations [ ] murmur  Resp: [ ] cough [- ] shortness of breath [ ] dyspnea [ ] wheezing [ ] sputum [ ] hemoptysis  GI: [ ] nausea [ ] vomiting [ ] diarrhea [ ] constipation [- ] abd pain [ ] dysphagia   : [ ] dysuria [ ] nocturia [ ] hematuria [ ] increased urinary frequency  Musculoskeletal: [- ] back pain [ ] myalgias [ ] arthralgias [ ] fracture  Skin: [- ] rash [ ] itch  Neurological: [ -] headache [ ] dizziness [ ] syncope [ ] weakness [ ] numbness  Psychiatric: [- ] anxiety [ ] depression  Endocrine: [+ ] diabetes [ ] thyroid problem  Hematologic/Lymphatic: [+ ] anemia [ ] bleeding problem  Allergic/Immunologic: [ ] itchy eyes [- ] nasal discharge [ ] hives [ ] angioedema  [+ ] All other systems negative  [ ] Unable to assess ROS because ________    OBJECTIVE:  ICU Vital Signs Last 24 Hrs  T(C): 36.8, Max: 37.1 (06-17 @ 17:00)  T(F): 98.3, Max: 98.8 (06-17 @ 17:00)  HR: 68 (64 - 80)  BP: 160/67 (123/57 - 186/80)  BP(mean): 97 (82 - 117)  ABP: --  ABP(mean): --  RR: 17 (11 - 32)  SpO2: 95% (95% - 100%)        I & Os for current day (as of 06-18 @ 07:16)  =============================================  IN: 150 ml / OUT: 50 ml / NET: 100 ml    CAPILLARY BLOOD GLUCOSE  129 (18 Jun 2017 05:00)      PHYSICAL EXAM:  General: AOX3, NAD  HEENT: wnl  Lymph Nodes: not appreciated  Neck: wnl  Respiratory: no respiratory distress, CTA b/l  Cardiovascular: RRR, no murmurs, normal perfusion  Abdomen: soft, NTND  Extremities: +1 pitting edema lt LE w/o deformity  Skin: no rash to LE, no wounds or increased warmth  Neurological: no focal deficits  Psychiatry: normal affect    LINES: peripheral    HOSPITAL MEDICATIONS:  MEDICATIONS  (STANDING):  heparin  Injectable 5000Unit(s) SubCutaneous every 12 hours  aspirin enteric coated 81milliGRAM(s) Oral daily  lisinopril 40milliGRAM(s) Oral daily  DULoxetine 60milliGRAM(s) Oral daily  atorvastatin 20milliGRAM(s) Oral at bedtime  clopidogrel Tablet 75milliGRAM(s) Oral at bedtime  metoprolol succinate ER 50milliGRAM(s) Oral daily  furosemide    Tablet 40milliGRAM(s) Oral <User Schedule>  cinacalcet 60milliGRAM(s) Oral daily  sevelamer hydrochloride 2400milliGRAM(s) Oral three times a day with meals  hydrALAZINE 100milliGRAM(s) Oral every 8 hours  insulin lispro (HumaLOG) Pump 1Each SubCutaneous Continuous Pump    MEDICATIONS  (PRN):  dextrose Gel 1Dose(s) Oral once PRN Blood Glucose LESS THAN 70 milliGRAM(s)/deciLiter  glucagon  Injectable 1milliGRAM(s) IntraMuscular once PRN Glucose <70 milliGRAM(s)/deciLiter  oxyCODONE IR 5milliGRAM(s) Oral every 6 hours PRN Moderate Pain (4 - 6)      LABS:                        9.1    4.8   )-----------( 272      ( 18 Jun 2017 02:36 )             27.0     06-18    141  |  99  |  19  ----------------------------<  147<H>  4.6   |  30  |  4.25<H>    Ca    7.9<L>      18 Jun 2017 02:36  Phos  2.5     06-18  Mg     2.3     06-18    TPro  6.8  /  Alb  4.0  /  TBili  0.3  /  DBili  x   /  AST  33  /  ALT  20  /  AlkPhos  278<H>  06-16    PT/INR - ( 17 Jun 2017 02:48 )   PT: 12.5 sec;   INR: 1.14 ratio    PTT - ( 18 Jun 2017 02:36 )  PTT:28.9 sec      Venous Blood Gas:  06-16 @ 15:37  7.45/49/53/33/87    MICROBIOLOGY:   no new cultures     RADIOLOGY:  [ x] Reviewed and interpreted by me    TTE 6/16  Conclusions:  1. Mild concentric left ventricular hypertrophy.  2. Normal left ventricular systolic function. No segmental  wall motion abnormalities. Septal motion consistent with  right ventricular overload.  3. Moderate diastolic dysfunction (Stage II).  4. Right ventricule not well visualized but TAPSE of 1.6 cm  suggets reduced function.  ------------------------------------------------------------------------  Confirmed on  6/16/2017 - 14:49:12 by RACH Beth DO  [phone]

## 2017-06-18 NOTE — PROGRESS NOTE ADULT - SUBJECTIVE AND OBJECTIVE BOX
CC: No CP/SOB    TELEMETRY: no events    PHYSICAL EXAM:    T(C): 37.1, Max: 37.1 (06-17 @ 17:00)  HR: 67 (65 - 80)  BP: 148/95 (123/57 - 186/80)  RR: 17 (11 - 32)  SpO2: 99% (95% - 100%)  Wt(kg): --  I&O's Summary    I & Os for current day (as of 18 Jun 2017 09:08)  =============================================  IN: 150 ml / OUT: 50 ml / NET: 100 ml      Appearance: Normal	  Cardiovascular: Normal S1 S2,RRR, No JVD, No murmurs  Respiratory: Lungs clear to auscultation	  Gastrointestinal:  Soft, Non-tender, + BS	  Extremities: Normal range of motion, No clubbing, cyanosis or edema  Vascular: Peripheral pulses palpable 2+ bilaterally     LABS:	 	                          9.1    4.8   )-----------( 272      ( 18 Jun 2017 02:36 )             27.0     06-18    141  |  99  |  19  ----------------------------<  147<H>  4.6   |  30  |  4.25<H>    Ca    7.9<L>      18 Jun 2017 02:36  Phos  2.5     06-18  Mg     2.3     06-18    TPro  6.8  /  Alb  4.0  /  TBili  0.3  /  DBili  x   /  AST  33  /  ALT  20  /  AlkPhos  278<H>  06-16      PT/INR - ( 17 Jun 2017 02:48 )   PT: 12.5 sec;   INR: 1.14 ratio         PTT - ( 18 Jun 2017 02:36 )  PTT:28.9 sec    CARDIAC MARKERS:

## 2017-06-19 ENCOUNTER — TRANSCRIPTION ENCOUNTER (OUTPATIENT)
Age: 62
End: 2017-06-19

## 2017-06-19 DIAGNOSIS — E10.9 TYPE 1 DIABETES MELLITUS WITHOUT COMPLICATIONS: ICD-10-CM

## 2017-06-19 PROCEDURE — 99233 SBSQ HOSP IP/OBS HIGH 50: CPT | Mod: GC

## 2017-06-19 PROCEDURE — 12345: CPT | Mod: NC

## 2017-06-19 RX ORDER — INSULIN LISPRO 100/ML
1 VIAL (ML) SUBCUTANEOUS
Qty: 0 | Refills: 0 | Status: DISCONTINUED | OUTPATIENT
Start: 2017-06-19 | End: 2017-06-20

## 2017-06-19 RX ADMIN — CINACALCET 60 MILLIGRAM(S): 30 TABLET, FILM COATED ORAL at 13:08

## 2017-06-19 RX ADMIN — SEVELAMER CARBONATE 2400 MILLIGRAM(S): 2400 POWDER, FOR SUSPENSION ORAL at 08:59

## 2017-06-19 RX ADMIN — SEVELAMER CARBONATE 2400 MILLIGRAM(S): 2400 POWDER, FOR SUSPENSION ORAL at 13:09

## 2017-06-19 RX ADMIN — OXYCODONE HYDROCHLORIDE 5 MILLIGRAM(S): 5 TABLET ORAL at 20:06

## 2017-06-19 RX ADMIN — OXYCODONE HYDROCHLORIDE 5 MILLIGRAM(S): 5 TABLET ORAL at 05:40

## 2017-06-19 RX ADMIN — CLOPIDOGREL BISULFATE 75 MILLIGRAM(S): 75 TABLET, FILM COATED ORAL at 22:05

## 2017-06-19 RX ADMIN — Medication 81 MILLIGRAM(S): at 13:08

## 2017-06-19 RX ADMIN — ATORVASTATIN CALCIUM 20 MILLIGRAM(S): 80 TABLET, FILM COATED ORAL at 22:05

## 2017-06-19 RX ADMIN — Medication 40 MILLIGRAM(S): at 08:59

## 2017-06-19 RX ADMIN — OXYCODONE HYDROCHLORIDE 5 MILLIGRAM(S): 5 TABLET ORAL at 05:08

## 2017-06-19 RX ADMIN — DULOXETINE HYDROCHLORIDE 60 MILLIGRAM(S): 30 CAPSULE, DELAYED RELEASE ORAL at 13:08

## 2017-06-19 RX ADMIN — OXYCODONE HYDROCHLORIDE 5 MILLIGRAM(S): 5 TABLET ORAL at 19:36

## 2017-06-19 RX ADMIN — LISINOPRIL 40 MILLIGRAM(S): 2.5 TABLET ORAL at 13:08

## 2017-06-19 RX ADMIN — Medication 100 MILLIGRAM(S): at 22:04

## 2017-06-19 RX ADMIN — Medication 50 MILLIGRAM(S): at 05:10

## 2017-06-19 RX ADMIN — Medication 100 MILLIGRAM(S): at 05:10

## 2017-06-19 NOTE — PROGRESS NOTE ADULT - PROBLEM SELECTOR PLAN 2
- No longer complaining of chest pain/sob  - continue ASA/Plavix  - cardiology signed off, repeat echo stable  - no further work up at this time - no longer complaining of chest pain/SOB  - continue ASA/Plavix  - cardiology signed off, repeat echo stable  - no further work up at this time

## 2017-06-19 NOTE — PROVIDER CONTACT NOTE (OTHER) - SITUATION
Pt vomited x1 and complaining of midline chest pain. KALI jones at bedside assessing pt. Pt states having felt this type of pain before in chest.
Pt arrived to floor FS checked and resulted with "HI" and of an unreportable range. Pt has insulin pump on LLQ and has signed self attestation for being able to use. Pt asymptomatic/
Blood sugar noted to be elevated at 595, pt states she has been adjusting insulin pump based on blood sugar
patient's FS 62, repeat FS 63. gave first po treatment (apple juice), FS 74. Given second PO treatment (apple juice), FS 88. MD notified. aware of the fact that patient doesn't want any dextrose .

## 2017-06-19 NOTE — PROVIDER CONTACT NOTE (OTHER) - REASON
Elevated Blood Sugar
Hypoglycemia FS 62, repeat 63
Pt FS Unreportable range "HI"
Vomiting and Midline chest pain

## 2017-06-19 NOTE — DISCHARGE NOTE ADULT - NS AS DC FOLLOWUP STROKE INST
Stroke (includes: TIA/SAH/ICH/Ischemic Stroke)/Influenza vaccination (VIS Pub Date: August 19, 2014)

## 2017-06-19 NOTE — PROGRESS NOTE ADULT - SUBJECTIVE AND OBJECTIVE BOX
Patient is a 61y old  Female who presents with a chief complaint of pain  and swelling in left leg (15 Christian 2017 20:50)    SUBJECTIVE / OVERNIGHT EVENTS:  No acute events overnight. Patient denies CP, SOB, fevers, chills, N/V, diarrhea, dysuria.     MEDICATIONS  (STANDING):  dextrose 5%. 1000milliLiter(s) IV Continuous <Continuous>  dextrose 50% Injectable 12.5Gram(s) IV Push once  dextrose 50% Injectable 25Gram(s) IV Push once  dextrose 50% Injectable 25Gram(s) IV Push once  heparin  Injectable 5000Unit(s) SubCutaneous every 12 hours  aspirin enteric coated 81milliGRAM(s) Oral daily  lisinopril 40milliGRAM(s) Oral daily  DULoxetine 60milliGRAM(s) Oral daily  atorvastatin 20milliGRAM(s) Oral at bedtime  clopidogrel Tablet 75milliGRAM(s) Oral at bedtime  metoprolol succinate ER 50milliGRAM(s) Oral daily  furosemide    Tablet 40milliGRAM(s) Oral <User Schedule>  cinacalcet 60milliGRAM(s) Oral daily  sevelamer hydrochloride 2400milliGRAM(s) Oral three times a day with meals  hydrALAZINE 100milliGRAM(s) Oral every 8 hours  insulin lispro (HumaLOG) Pump 1Each SubCutaneous Continuous Pump    MEDICATIONS  (PRN):  dextrose Gel 1Dose(s) Oral once PRN Blood Glucose LESS THAN 70 milliGRAM(s)/deciLiter  glucagon  Injectable 1milliGRAM(s) IntraMuscular once PRN Glucose <70 milliGRAM(s)/deciLiter  oxyCODONE IR 5milliGRAM(s) Oral every 6 hours PRN Moderate Pain (4 - 6)      Vital Signs Last 24 Hrs  T(C): 36.6, Max: 37 (06-18 @ 14:07)  HR: 69 (68 - 76)  BP: 179/73 (148/104 - 179/73)  RR: 18 (18 - 23)  SpO2: 98% (97% - 98%)  Wt(kg): --  CAPILLARY BLOOD GLUCOSE  116 (19 Jun 2017 08:40)  120 (19 Jun 2017 06:26)  100 (19 Jun 2017 02:02)  120 (18 Jun 2017 22:29)  81 (18 Jun 2017 18:01)  244 (18 Jun 2017 12:49)  128 (18 Jun 2017 09:44)    I&O's Summary    I & Os for current day (as of 19 Jun 2017 09:11)  =============================================  IN: 240 ml / OUT: 0 ml / NET: 240 ml      PHYSICAL EXAM:  GENERAL: NAD, well-developed  HEAD:  Atraumatic, Normocephalic  EYES: EOMI, PERRLA, conjunctiva and sclera clear  NECK: Supple, No JVD  CHEST/LUNG: Clear to auscultation bilaterally; No wheeze  HEART: Regular rate and rhythm; No murmurs, rubs, or gallops  ABDOMEN: Soft, Nontender, Nondistended; Bowel sounds present  EXTREMITIES: No clubbing, cyanosis, or edema  PSYCH: AAOx3  NEUROLOGY: non-focal  SKIN: No rashes or lesions    LABS:               RADIOLOGY & ADDITIONAL TESTS:    Imaging Personally Reviewed:    Consultant(s) Notes Reviewed:      Care Discussed with Consultants/Other Providers:

## 2017-06-19 NOTE — DISCHARGE NOTE ADULT - MEDICATION SUMMARY - MEDICATIONS TO CHANGE
I will SWITCH the dose or number of times a day I take the medications listed below when I get home from the hospital:    NovoLOG 100 units/mL subcutaneous solution  --  subcutaneous   Novolog  Insulin Pump    Basal rate start at 0000 at 0.40 units /hr  0330 at 0.50 units/hr  0600 at 0.425 units/hr   Insulin carb ratio   0000 :1.9 units/gram  1000:  1.10unit/gram   1500: 1.9 units/gram   Insulin sensitivity factor   0000 ISF 60  0700 ISF 40  1800 ISF 60  BGT   0000: 110-130 mg/dl   0800 :  100-120

## 2017-06-19 NOTE — DISCHARGE NOTE ADULT - NS AS DC STROKE ED MATERIALS
Stroke Education Booklet/Need for Followup After Discharge/Stroke Warning Signs and Symptoms/Call 911 for Stroke/Risk Factors for Stroke/Prescribed Medications

## 2017-06-19 NOTE — PROGRESS NOTE ADULT - PROBLEM SELECTOR PLAN 1
- s/p MICU admission, AG closed  - sugars now well controlled on insulin pump.  - f/u further endocrine recommendations - s/p MICU admission, AG closed  - patient restarted on insulin pump at time of discharge from MICU  - FSBG low today at HD 60s-70s, resolved with juice, f/u endocrinology recs re: pump settings  - f/u further endocrine recommendations

## 2017-06-19 NOTE — DISCHARGE NOTE ADULT - MEDICATION SUMMARY - MEDICATIONS TO TAKE
I will START or STAY ON the medications listed below when I get home from the hospital:    aspirin 81 mg oral delayed release tablet  -- 1 tab(s) by mouth once a day  -- Indication: For CAD (coronary artery disease)    oxycodone-acetaminophen 5mg-325mg oral tablet  -- 2 tab(s) by mouth once a day (in the evening)  -- Indication: For PAIN    lisinopril 40 mg oral tablet  -- 1 tab(s) by mouth once a day  -- Indication: For HTN    DULoxetine 60 mg oral delayed release capsule  -- 1 cap(s) by mouth once a day  -- Indication: For DEPRESSION    HumaLOG 100 units/mL subcutaneous solution  -- Humalog Pump  BASAL:  00:00 - 0.3 U/Hr  03:00 - 0.45 U/Hr  06:00 - 0.425 U/Hr    Insulin to carb ration  00:00 9U: gram  11:00 10U: gram  15:00 9U: gram    Insulin Sensitivity factor  00:00 ISF 60  07:00 40  18:00 60    Blood glucose target:  110-130    -- Indication: For T1DM    atorvastatin 20 mg oral tablet  -- 1 tab(s) by mouth once a day (at bedtime)  -- Indication: For CAD (coronary artery disease)    metoprolol succinate 50 mg oral tablet, extended release  -- 1 tab(s) by mouth once a day  -- Indication: For HTN (hypertension)    furosemide 40 mg oral tablet  -- 1 tab(s) by mouth 3 times a week on Monday , Friday and Sunday  -- Indication: For HTN (hypertension)    cinacalcet 60 mg oral tablet  -- 1 tab(s) by mouth once a day  -- Indication: For PPX    Renvela 800 mg oral tablet  -- 3 tab(s) by mouth 3 times a day  -- Indication: For ESRD (end stage renal disease)    hydrALAZINE 25 mg oral tablet  -- 4 tab(s) by mouth every 8 hours  -- Indication: For HTN (hypertension)    Multiple Vitamins oral tablet  -- 1 tab(s) by mouth once a day  -- Indication: For PPX I will START or STAY ON the medications listed below when I get home from the hospital:    aspirin 81 mg oral delayed release tablet  -- 1 tab(s) by mouth once a day  -- Indication: For CAD (coronary artery disease)    oxycodone-acetaminophen 5mg-325mg oral tablet  -- 2 tab(s) by mouth once a day (in the evening)  -- Indication: For PAIN    lisinopril 40 mg oral tablet  -- 1 tab(s) by mouth once a day  -- Indication: For HTN    DULoxetine 60 mg oral delayed release capsule  -- 1 cap(s) by mouth once a day  -- Indication: For DEPRESSION    HumaLOG 100 units/mL subcutaneous solution  -- Humalog Pump  BASAL:  00:00 - 0.3 U/Hr  05:00 - 0.425 U/Hr    Insulin to carb ratio  00:00 1U:9gram  11:00 1U:10gram  15:00 1U:9gram    Insulin Sensitivity factor  00:00 ISF 60  07:00 ISF 40  18:00 ISF 60    Blood glucose target:  00:00 110-130  08:00 100-120    -- Indication: For Diabetes mellitus type 1    atorvastatin 20 mg oral tablet  -- 1 tab(s) by mouth once a day (at bedtime)  -- Indication: For CAD (coronary artery disease)    metoprolol succinate 50 mg oral tablet, extended release  -- 1 tab(s) by mouth once a day  -- Indication: For HTN (hypertension)    furosemide 40 mg oral tablet  -- 1 tab(s) by mouth 3 times a week on Monday , Friday and Sunday  -- Indication: For HTN (hypertension)    cinacalcet 60 mg oral tablet  -- 1 tab(s) by mouth once a day  -- Indication: For PPX    Renvela 800 mg oral tablet  -- 3 tab(s) by mouth 3 times a day  -- Indication: For ESRD (end stage renal disease)    hydrALAZINE 25 mg oral tablet  -- 4 tab(s) by mouth every 8 hours  -- Indication: For HTN (hypertension)    Multiple Vitamins oral tablet  -- 1 tab(s) by mouth once a day  -- Indication: For PPX I will START or STAY ON the medications listed below when I get home from the hospital:    aspirin 81 mg oral delayed release tablet  -- 1 tab(s) by mouth once a day  -- Indication: For CAD (coronary artery disease)    oxycodone-acetaminophen 5mg-325mg oral tablet  -- 2 tab(s) by mouth once a day (in the evening)  -- Indication: For PAIN    lisinopril 40 mg oral tablet  -- 1 tab(s) by mouth once a day  -- Indication: For HTN    DULoxetine 60 mg oral delayed release capsule  -- 1 cap(s) by mouth once a day  -- Indication: For DEPRESSION    HumaLOG 100 units/mL subcutaneous solution  -- Humalog Pump  BASAL:  00:00 - 0.3 U/Hr  05:00 - 0.425 U/Hr    Insulin to carb ratio  00:00 1U:9gram  11:00 1U:10gram  15:00 1U:9gram    Insulin Sensitivity factor  00:00 ISF 60  07:00 ISF 40  18:00 ISF 60    Blood glucose target:  00:00 110-130  08:00 100-120    -- Indication: For Diabetes mellitus type 1    atorvastatin 20 mg oral tablet  -- 1 tab(s) by mouth once a day (at bedtime)  -- Indication: For CAD (coronary artery disease)    clopidogrel 75 mg oral tablet  -- 1 tab(s) by mouth once a day (at bedtime)  -- Indication: For Peripheral vascular disease    metoprolol succinate 50 mg oral tablet, extended release  -- 1 tab(s) by mouth once a day  -- Indication: For HTN (hypertension)    furosemide 40 mg oral tablet  -- 1 tab(s) by mouth 3 times a week on Monday , Friday and Sunday  -- Indication: For HTN (hypertension)    cinacalcet 60 mg oral tablet  -- 1 tab(s) by mouth once a day  -- Indication: For PPX    Renvela 800 mg oral tablet  -- 3 tab(s) by mouth 3 times a day  -- Indication: For ESRD (end stage renal disease)    hydrALAZINE 25 mg oral tablet  -- 4 tab(s) by mouth every 8 hours  -- Indication: For HTN (hypertension)    Multiple Vitamins oral tablet  -- 1 tab(s) by mouth once a day  -- Indication: For PPX

## 2017-06-19 NOTE — DISCHARGE NOTE ADULT - CARE PROVIDERS DIRECT ADDRESSES
,DirectAddress_Unknown,[email or web address] ,DirectAddress_Unknown,DirectAddress_Unknown

## 2017-06-19 NOTE — ADVANCED PRACTICE NURSE CONSULT - ASSESSMENT
Pt has H/O longstanding T1DM in Medtr[employer] insulin pump with ERSD, neuropathy, retinopathy, CAD, PAD s/p recent LLE angioplasty came for SOB and CP while getting HD. Also has LLE edema and warmth, possibly early cellulitis. She went into DKA and that resolved   Pt has 3 days of insulin pump supplies with her.  Insulin pump site remains clean, dry, and intact.  Pt denies s/s hypo-hyperglycemia.  Current insulin pump rates as follows:    Basal  12am – 0.3 units/hour  3:30am – 0.45 units/hour  6am – 0.425 units/hour    ICR  12am – 1:9  11am: 1:10  3pm – 1:9    ISF  12am – 60  7am – 40  6pm – 60     Blood Glucose Target  12am – 110-130 mg/dl  8am – 100-120 mg/dl    Active insulin time: 6 hours

## 2017-06-19 NOTE — DISCHARGE NOTE ADULT - PATIENT PORTAL LINK FT
“You can access the [employer] Patient Portal, offered by [facility], by registering with the following website: [email or web address]”

## 2017-06-19 NOTE — PROVIDER CONTACT NOTE (OTHER) - ASSESSMENT
Pt A&Ox4; VSS- denies and headache or visual changes.
Pt with increased urination
VSS, no acute distress, denies any nausea and vomiting. no dizziness noted
Pt A&Ox4; SOB noted. /70 HR 97  98.2Foral 20 RR and o2-100% on 3L- NC

## 2017-06-19 NOTE — PROVIDER CONTACT NOTE (OTHER) - BACKGROUND
Pt admitted for SOB and Chest pain. Pt hx of DM1, CVA, HTN
ESRD, patient has insulin pump, gives bolus herself
Pt has history of Type 1 DM, Insulin pump
Pt admitted for SOB and Chest pain. Pt hx of DM1, CVA, HTN

## 2017-06-19 NOTE — ADVANCED PRACTICE NURSE CONSULT - RECOMMEDATIONS
Pt has sufficient BG testing, insulin pump supplies at home. Primary RN to f/u with BG monitoring, making sure pt consumes carb consistent meals, insulin pump site assessment, monitoring S/S hypo/hyperglycemia and tracking carb intake, meal/correction boluses. Insulin pump forms all completed but attestation form needs to be signed by attending physician.  Primary RN made aware and to f/u with attending physician    DSME provided regarding S/S, prevention and appropriate treatment of hyperglycemia and hypoglycemia.  Pt aware that she should only use hospital insulin and glucometer while in hospital.  Pt verbalizes adequate understanding of all instructions via the teach-back method.

## 2017-06-19 NOTE — PROGRESS NOTE ADULT - SUBJECTIVE AND OBJECTIVE BOX
[facility]   [phone]  DIALYSIS NOTE  Chief Complaint: ESRD/Ongoing hemodialysis requirement. seen on hd    24 hour events/subjective:      had hypoglycemic event today      ALLERGIES & MEDICATIONS  --------------------------------------------------------------------------------  Allergies    No Known Allergies    Intolerances      Standing Inpatient Medications  dextrose 5%. 1000milliLiter(s) IV Continuous <Continuous>  dextrose 50% Injectable 12.5Gram(s) IV Push once  dextrose 50% Injectable 25Gram(s) IV Push once  dextrose 50% Injectable 25Gram(s) IV Push once  heparin  Injectable 5000Unit(s) SubCutaneous every 12 hours  aspirin enteric coated 81milliGRAM(s) Oral daily  lisinopril 40milliGRAM(s) Oral daily  DULoxetine 60milliGRAM(s) Oral daily  atorvastatin 20milliGRAM(s) Oral at bedtime  clopidogrel Tablet 75milliGRAM(s) Oral at bedtime  metoprolol succinate ER 50milliGRAM(s) Oral daily  furosemide    Tablet 40milliGRAM(s) Oral <User Schedule>  cinacalcet 60milliGRAM(s) Oral daily  sevelamer hydrochloride 2400milliGRAM(s) Oral three times a day with meals  hydrALAZINE 100milliGRAM(s) Oral every 8 hours  insulin lispro (HumaLOG) Pump 1Each SubCutaneous Continuous Pump    PRN Inpatient Medications  dextrose Gel 1Dose(s) Oral once PRN  glucagon  Injectable 1milliGRAM(s) IntraMuscular once PRN  oxyCODONE IR 5milliGRAM(s) Oral every 6 hours PRN      REVIEW OF SYSTEMS  --------------------------------------------------------------------------------  no itching or rash  no fever or chill  no cp or palp   no sob or cough   no N/V/D/ no abd pain   ext no edema no pain   + fatigue      VITALS/PHYSICAL EXAM  --------------------------------------------------------------------------------  T(C): 36.3, Max: 36.8 (06-18 @ 22:35)  HR: 65 (65 - 73)  BP: 156/74 (156/74 - 179/73)  RR: 18 (18 - 20)  SpO2: 98% (96% - 98%)  Wt(kg): --  Height (cm): 162.6 (06-18-17 @ 10:40)  Weight (kg): 61 (06-18-17 @ 10:40)  BMI (kg/m2): 23.1 (06-18-17 @ 10:40)  BSA (m2): 1.65 (06-18-17 @ 10:40)      I & Os for current day (as of 06-19-17 @ 17:56)  =============================================  IN: 240 ml / OUT: 0 ml / NET: 240 ml    Physical Exam:  		    	Gen: alert oriented place person and date   	Pulm: Decreased breath sounds b/l bases no rales or ronchi  	CV: RRR, S1/S2  	Abd: +BS, soft, nontender/nondistended  	Extremity: LLE 2+ edema   		    LABS/STUDIES  --------------------------------------------------------------------------------              9.1    4.8   >-----------<  272      [06-18-17 @ 02:36]              27.0     141  |  99  |  19  ----------------------------<  147      [06-18-17 @ 02:36]  4.6   |  30  |  4.25        Ca     7.9     [06-18-17 @ 02:36]      Mg     2.3     [06-18-17 @ 02:36]      Phos  2.5     [06-18-17 @ 02:36]        PTT: 28.9       [06-18-17 @ 02:36]              imp/suggest: ESRD      Hemodialysis Prescription:  	Access: avf  	Dialyzer: revaclear 300  	Blood Flow (mL/Min): 400  	Dialysate Flow (mL/Min): 600  	Target UF (Liters):1 liter  	Treatment Time: 2hr  	Potassium: 2k   	Calcium: 2.5  	    continue with hd   see hd flow sheet  next hd am pt gets 4 times per week hd

## 2017-06-19 NOTE — PROVIDER CONTACT NOTE (OTHER) - ACTION/TREATMENT ORDERED:
PA states to do STAT EKG and Zofran IVP ordered for n/v. PA states MICU consult ordered.
PA made aware and states will come and assess pt herself and put in STAT LABS.
AS per NP Tiffanie will consult endocrinology once again to follow up on insulin pump management
recheck FS and notify when it goes above 100

## 2017-06-19 NOTE — PROGRESS NOTE ADULT - PROBLEM SELECTOR PLAN 1
DKA resolved. On medtronic mini med insulin pump. Midnight and 3:30AM basal rate settings were decreased on Saturday. She was hypoglycemic today after giving 2 prebreakfast boluses. No clear indication to adjust pump settings at present. Will continue to monitor and adjust as indicated. She is due to change infusion site tomorrow.

## 2017-06-19 NOTE — DISCHARGE NOTE ADULT - COMMUNITY RESOURCES
Please continue to resume services with [facility] on Tuesday 6/20 at regularly scheduled times from 10am- 2pm

## 2017-06-19 NOTE — ADVANCED PRACTICE NURSE CONSULT - REASON FOR CONSULT
62 y/o female with well controlled T1DM on Medtronic insulin pump with ESRD, neuropathy, retinopathy, cad, pad s/p recent LLE angioplasty came for SOB and CP while getting HD. Also has LLE edema and warmth, possibly early cellulitis. She went into DKA and that resolved so Dr. Kashif Miguel recommended insulin pump be resumed with home settings and monitor for hyperglycemia while early leg cellulitis and/or pump insulin absorption issues.  On 6/17 pt went hypoglycemic overnight and basal rates decreased at 12am and 3:30am.  Pt consulted for longstanding T1DM and insulin pump therapy.

## 2017-06-19 NOTE — PROGRESS NOTE ADULT - ASSESSMENT
60 yo F w/PMH of T1DM on insulin pump, CAD, PAD, HTN, HLD, ESRD on HD, chronic pain initially presented with chest pain now resolved found to be in DKA, admitted to MICU for insulin drip, now transferred to the floor for further management, insulin pump restarted

## 2017-06-19 NOTE — DISCHARGE NOTE ADULT - CARE PLAN
Principal Discharge DX:	DKA, type 1  Goal:	continue with insulin regimen as prescribed  Instructions for follow-up, activity and diet:	you were admitted for DKA 2/2 to uncontrolled sugars, please follow up with your endocrinologist closely and do not stray away from your insulin regimen unless told to do so.  Secondary Diagnosis:	ESRD (end stage renal disease) on dialysis  Instructions for follow-up, activity and diet:	Please continue to go to dialysis as scheduled.

## 2017-06-19 NOTE — DISCHARGE NOTE ADULT - CARE PROVIDER_API CALL
Tee Biswas (MD), Cardiovascular Disease; Internal Medicine  [address] [address] [place] [address]  Phone: [phone]  Fax: [phone]    Nik Lorenzo (), Internal Medicine  [address] [place] [address]  Phone: [phone]  Fax: [phone] Tee Biswas), Cardiovascular Disease; Internal Medicine  [address] [address] [place] [address]  Phone: [phone]  Fax: [phone]    Pina Ley (SUSAN), EndocrinologyMetabDiabetes  [address] [address] [place] [address]  Phone: [phone]  Fax: [phone]

## 2017-06-19 NOTE — PROGRESS NOTE ADULT - SUBJECTIVE AND OBJECTIVE BOX
Chief Complaint: DKA/DM1 on Insulin Pump    History:   T1DM diagnosed 40 years ago, on medtronic mini med insulin pump with neuropathy, retinopathy, ESRD on HD, CAD, CVA, PAD s/p bypass, recently had LLE angioplasty admitted due to CP/SOB at HD, found to be in DKA, now resolved post MICU admission.     Hypoglycemic to 62 before lunch today after bolusing herself twice. Corrected with apple juice and eating lunch. She reports good appetite; eating entire meals. Did not bolus pre breakfast yesterday.     MEDICATIONS  (STANDING):  dextrose 5%. 1000milliLiter(s) IV Continuous <Continuous>  dextrose 50% Injectable 12.5Gram(s) IV Push once  dextrose 50% Injectable 25Gram(s) IV Push once  dextrose 50% Injectable 25Gram(s) IV Push once  heparin  Injectable 5000Unit(s) SubCutaneous every 12 hours  aspirin enteric coated 81milliGRAM(s) Oral daily  lisinopril 40milliGRAM(s) Oral daily  DULoxetine 60milliGRAM(s) Oral daily  atorvastatin 20milliGRAM(s) Oral at bedtime  clopidogrel Tablet 75milliGRAM(s) Oral at bedtime  metoprolol succinate ER 50milliGRAM(s) Oral daily  furosemide    Tablet 40milliGRAM(s) Oral <User Schedule>  cinacalcet 60milliGRAM(s) Oral daily  sevelamer hydrochloride 2400milliGRAM(s) Oral three times a day with meals  hydrALAZINE 100milliGRAM(s) Oral every 8 hours  insulin lispro (HumaLOG) Pump 1Each SubCutaneous Continuous Pump    MEDICATIONS  (PRN):  dextrose Gel 1Dose(s) Oral once PRN Blood Glucose LESS THAN 70 milliGRAM(s)/deciLiter  glucagon  Injectable 1milliGRAM(s) IntraMuscular once PRN Glucose <70 milliGRAM(s)/deciLiter  oxyCODONE IR 5milliGRAM(s) Oral every 6 hours PRN Moderate Pain (4 - 6)      Allergies    No Known Allergies    Intolerances        PHYSICAL EXAM:  VITALS: T(C): 36.6  T(F): 97.9, Max: 98.3 (06-18 @ 22:35)  HR: 69 (69 - 73)  BP: 167/84 (161/77 - 179/73)  RR:  (18 - 20)  SpO2:  (96% - 98%)  Wt(kg): --  Gen: NAD  CV: +s1,s2,rrr  Pul CTA B/L  Ab soft, NT, ND. +BS, RLQ infusion site due to be changed tomorrow.     CAPILLARY BLOOD GLUCOSE  107 (06-19 @ 13:46)  88 (06-19 @ 12:55)  74 (06-19 @ 12:40)  63 (06-19 @ 12:26)  62 (06-19 @ 12:25)  116 (06-19 @ 08:40)  120 (06-19 @ 06:26)  100 (06-19 @ 02:02)  120 (06-18 @ 22:29)  81 (06-18 @ 18:01)  244 (06-18 @ 12:49)  128 (06-18 @ 09:44)  113 (06-18 @ 08:00)  129 (06-18 @ 05:00)  130 (06-18 @ 01:00)  98 (06-17 @ 21:00)  130 (06-17 @ 17:00)  129 (06-17 @ 13:00)  99 (06-17 @ 08:00)  79 (06-17 @ 06:00)  89 (06-17 @ 04:00)  68 (06-17 @ 03:00)  87 (06-17 @ 00:30)  70 (06-17 @ 00:00)  95 (06-16 @ 23:00)  131 (06-16 @ 22:00)  120 (06-16 @ 21:11)  120 (06-16 @ 21:00)  146 (06-16 @ 20:00)  141 (06-16 @ 19:00)  156 (06-16 @ 18:00)  126 (06-16 @ 17:00)      06-18    141  |  99  |  19  ----------------------------<  147<H>  4.6   |  30  |  4.25<H>    EGFR if : 12<L>  EGFR if non : 11<L>    Ca    7.9<L>      06-18  Mg     2.3     06-18  Phos  2.5     06-18            Thyroid Function Tests:      Hemoglobin A1C, Whole Blood: 7.3 % <H> [4.0 - 5.6] (06-16 @ 04:45)  Hemoglobin A1C, Whole Blood: 8.5 % <H> [4.0 - 5.6] (04-11 @ 07:12) Chief Complaint: DKA/DM1 on Insulin Pump    History:   T1DM diagnosed 40 years ago, on medtronic mini med insulin pump with neuropathy, retinopathy, ESRD on HD, CAD, CVA, PAD s/p bypass, recently had LLE angioplasty admitted due to CP/SOB at HD, found to be in DKA, now resolved post MICU admission.     Hypoglycemic to 62 before lunch today after bolusing herself twice. Corrected with apple juice and eating lunch. She reports good appetite; eating entire meals. Did not bolus pre breakfast yesterday.     MEDICATIONS  (STANDING):  dextrose 5%. 1000milliLiter(s) IV Continuous <Continuous>  dextrose 50% Injectable 12.5Gram(s) IV Push once  dextrose 50% Injectable 25Gram(s) IV Push once  dextrose 50% Injectable 25Gram(s) IV Push once  heparin  Injectable 5000Unit(s) SubCutaneous every 12 hours  aspirin enteric coated 81milliGRAM(s) Oral daily  lisinopril 40milliGRAM(s) Oral daily  DULoxetine 60milliGRAM(s) Oral daily  atorvastatin 20milliGRAM(s) Oral at bedtime  clopidogrel Tablet 75milliGRAM(s) Oral at bedtime  metoprolol succinate ER 50milliGRAM(s) Oral daily  furosemide    Tablet 40milliGRAM(s) Oral <User Schedule>  cinacalcet 60milliGRAM(s) Oral daily  sevelamer hydrochloride 2400milliGRAM(s) Oral three times a day with meals  hydrALAZINE 100milliGRAM(s) Oral every 8 hours  insulin lispro (HumaLOG) Pump 1Each SubCutaneous Continuous Pump    MEDICATIONS  (PRN):  dextrose Gel 1Dose(s) Oral once PRN Blood Glucose LESS THAN 70 milliGRAM(s)/deciLiter  glucagon  Injectable 1milliGRAM(s) IntraMuscular once PRN Glucose <70 milliGRAM(s)/deciLiter  oxyCODONE IR 5milliGRAM(s) Oral every 6 hours PRN Moderate Pain (4 - 6)      Allergies    No Known Allergies    PHYSICAL EXAM:  VITALS: T(C): 36.6  T(F): 97.9, Max: 98.3 (06-18 @ 22:35)  HR: 69 (69 - 73)  BP: 167/84 (161/77 - 179/73)  RR:  (18 - 20)  SpO2:  (96% - 98%)  Wt(kg): --  Gen: NAD  HEENT: missing several upper and lower teeth  CV: +s1,s2,rrr  Pul CTA B/L  Ab soft, NT, ND. +BS,   Skin: RLQ infusion site due to be changed tomorrow.     CAPILLARY BLOOD GLUCOSE  107 (06-19 @ 13:46)  88 (06-19 @ 12:55)  74 (06-19 @ 12:40)  63 (06-19 @ 12:26)  62 (06-19 @ 12:25)  116 (06-19 @ 08:40)  120 (06-19 @ 06:26)  100 (06-19 @ 02:02)  120 (06-18 @ 22:29)  81 (06-18 @ 18:01)  244 (06-18 @ 12:49)  128 (06-18 @ 09:44)  113 (06-18 @ 08:00)  129 (06-18 @ 05:00)  130 (06-18 @ 01:00)  98 (06-17 @ 21:00)  130 (06-17 @ 17:00)  129 (06-17 @ 13:00)  99 (06-17 @ 08:00)  79 (06-17 @ 06:00)  89 (06-17 @ 04:00)  68 (06-17 @ 03:00)  87 (06-17 @ 00:30)  70 (06-17 @ 00:00)  95 (06-16 @ 23:00)  131 (06-16 @ 22:00)  120 (06-16 @ 21:11)  120 (06-16 @ 21:00)  146 (06-16 @ 20:00)  141 (06-16 @ 19:00)  156 (06-16 @ 18:00)  126 (06-16 @ 17:00)      06-18    141  |  99  |  19  ----------------------------<  147<H>  4.6   |  30  |  4.25<H>    EGFR if : 12<L>  EGFR if non : 11<L>    Ca    7.9<L>      06-18  Mg     2.3     06-18  Phos  2.5     06-18    Hemoglobin A1C, Whole Blood: 7.3 % <H> [4.0 - 5.6] (06-16 @ 04:45)  Hemoglobin A1C, Whole Blood: 8.5 % <H> [4.0 - 5.6] (04-11 @ 07:12)

## 2017-06-19 NOTE — DISCHARGE NOTE ADULT - PLAN OF CARE
continue with insulin regimen as prescribed you were admitted for DKA 2/2 to uncontrolled sugars, please follow up with your endocrinologist closely and do not stray away from your insulin regimen unless told to do so. Please continue to go to dialysis as scheduled.

## 2017-06-19 NOTE — DISCHARGE NOTE ADULT - HOSPITAL COURSE
61 f with DM on insulin pump, CAD, PAD, HTN, HLD, chronic pain admitted to hospital for CP work up and transfer to MICU for DKA, pt had been off insulin gtt since early evening of 6/16 and on home insulin pump, had 1 episode of asymptomatic hypogylcemia tx with orally, pump adjusted basal rate on pump. Pt chronically with CP being followed by cards, they do not believe ACS a factor and will stop trending trops. HD today. chronic left LE swelling seen by vascular- no acute interventions outpatient Follow up, no signs of infection.    6/18/17, pt had stable glucose 100-120. HD reinstated by CM and patient stable for discharge on 6/18/17. 61 f with DM on insulin pump, CAD, PAD, HTN, HLD, chronic pain admitted to hospital for CP work up and transfer to MICU for DKA, pt had been off insulin gtt since early evening of 6/16 and on home insulin pump, had 1 episode of asymptomatic hypogylcemia tx with orally, pump adjusted basal rate on pump. Pt chronically with CP being followed by cards, they do not believe ACS a factor and will stop trending trops. HD today. chronic left LE swelling seen by vascular- no acute interventions outpatient Follow up, no signs of infection. Course complicated by asymptomatic hypoglycemic episode to FSBG 63 in the setting of over-bolusing herself on her insulin pump. She was subsequently seen by endocrinology with re-education and pump settings adjusted. She was discharged home with outpatient HD and instructions for close follow up regarding her insulin pump and diabetes with her endocrinologist, Dr. Ley. 61 f with DM on insulin pump, CAD, PAD, HTN, HLD, chronic pain admitted to hospital for CP work up and transfer to MICU for DKA, pt had been off insulin gtt since early evening of 6/16 and on home insulin pump, had 1 episode of asymptomatic hypogylcemia tx with orally, pump adjusted basal rate on pump. Pt chronically with CP being followed by cards, they do not believe ACS a factor and will stop trending trops. HD today. chronic left LE swelling seen by vascular- no acute interventions outpatient Follow up, no signs of infection. Course complicated by asymptomatic hypoglycemic episode to FSBG 63 in the setting of over-bolusing herself on her insulin pump. She was subsequently seen by endocrinology with re-education and pump settings adjusted. She was discharged home with outpatient HD and instructions for close follow up regarding her insulin pump and diabetes with her endocrinologist, Dr. Ley. Sergio jean-baptiste contacted primary team prior to discharge regarding question of continuing home Plavix; per primary team med rec updated to continue Plavix. 61 f with DM on insulin pump, CAD, PAD, HTN, HLD, chronic pain admitted to hospital for CP work up and transfer to MICU for DKA, pt had been off insulin gtt since early evening of 6/16 and on home insulin pump, had 1 episode of asymptomatic hypogylcemia tx with orally, pump adjusted basal rate on pump. Pt chronically with CP being followed by cards, they do not believe ACS a factor and will stop trending trops. HD today. chronic left LE swelling seen by vascular- no acute interventions outpatient Follow up, no signs of infection. Course complicated by asymptomatic hypoglycemic episode to FSBG 63 in the setting of over-bolusing herself on her insulin pump. She was subsequently seen by endocrinology with re-education and pump settings adjusted. She was discharged home with outpatient HD and instructions for close follow up regarding her insulin pump and diabetes with her endocrinologist, Dr. Ley. Sergio float contacted primary team prior to discharge regarding question of continuing home Plavix; per primary team med rec updated to continue Plavix. Medically stable.

## 2017-06-20 VITALS
OXYGEN SATURATION: 98 % | SYSTOLIC BLOOD PRESSURE: 145 MMHG | WEIGHT: 120.15 LBS | HEART RATE: 69 BPM | TEMPERATURE: 99 F | RESPIRATION RATE: 18 BRPM | DIASTOLIC BLOOD PRESSURE: 58 MMHG

## 2017-06-20 DIAGNOSIS — E10.8 TYPE 1 DIABETES MELLITUS WITH UNSPECIFIED COMPLICATIONS: ICD-10-CM

## 2017-06-20 LAB
HBV CORE AB SER-ACNC: SIGNIFICANT CHANGE UP
HBV SURFACE AB SER-ACNC: <3 MIU/ML — LOW

## 2017-06-20 PROCEDURE — 85014 HEMATOCRIT: CPT

## 2017-06-20 PROCEDURE — 80053 COMPREHEN METABOLIC PANEL: CPT

## 2017-06-20 PROCEDURE — 83735 ASSAY OF MAGNESIUM: CPT

## 2017-06-20 PROCEDURE — 82010 KETONE BODYS QUAN: CPT

## 2017-06-20 PROCEDURE — 80048 BASIC METABOLIC PNL TOTAL CA: CPT

## 2017-06-20 PROCEDURE — 84145 PROCALCITONIN (PCT): CPT

## 2017-06-20 PROCEDURE — 82009 KETONE BODYS QUAL: CPT

## 2017-06-20 PROCEDURE — 82330 ASSAY OF CALCIUM: CPT

## 2017-06-20 PROCEDURE — 99261: CPT

## 2017-06-20 PROCEDURE — 84484 ASSAY OF TROPONIN QUANT: CPT

## 2017-06-20 PROCEDURE — 85027 COMPLETE CBC AUTOMATED: CPT

## 2017-06-20 PROCEDURE — 82803 BLOOD GASES ANY COMBINATION: CPT

## 2017-06-20 PROCEDURE — 85610 PROTHROMBIN TIME: CPT

## 2017-06-20 PROCEDURE — 86704 HEP B CORE ANTIBODY TOTAL: CPT

## 2017-06-20 PROCEDURE — 84295 ASSAY OF SERUM SODIUM: CPT

## 2017-06-20 PROCEDURE — 82947 ASSAY GLUCOSE BLOOD QUANT: CPT

## 2017-06-20 PROCEDURE — 83605 ASSAY OF LACTIC ACID: CPT

## 2017-06-20 PROCEDURE — 86706 HEP B SURFACE ANTIBODY: CPT

## 2017-06-20 PROCEDURE — 93306 TTE W/DOPPLER COMPLETE: CPT

## 2017-06-20 PROCEDURE — 84100 ASSAY OF PHOSPHORUS: CPT

## 2017-06-20 PROCEDURE — 71046 X-RAY EXAM CHEST 2 VIEWS: CPT

## 2017-06-20 PROCEDURE — 93005 ELECTROCARDIOGRAM TRACING: CPT

## 2017-06-20 PROCEDURE — 85730 THROMBOPLASTIN TIME PARTIAL: CPT

## 2017-06-20 PROCEDURE — 83036 HEMOGLOBIN GLYCOSYLATED A1C: CPT

## 2017-06-20 PROCEDURE — 84132 ASSAY OF SERUM POTASSIUM: CPT

## 2017-06-20 PROCEDURE — 99233 SBSQ HOSP IP/OBS HIGH 50: CPT

## 2017-06-20 PROCEDURE — 82435 ASSAY OF BLOOD CHLORIDE: CPT

## 2017-06-20 PROCEDURE — 82550 ASSAY OF CK (CPK): CPT

## 2017-06-20 PROCEDURE — 99285 EMERGENCY DEPT VISIT HI MDM: CPT | Mod: 25

## 2017-06-20 PROCEDURE — 83690 ASSAY OF LIPASE: CPT

## 2017-06-20 PROCEDURE — 82553 CREATINE MB FRACTION: CPT

## 2017-06-20 RX ORDER — CLOPIDOGREL BISULFATE 75 MG/1
1 TABLET, FILM COATED ORAL
Qty: 0 | Refills: 0 | COMMUNITY
Start: 2017-06-20

## 2017-06-20 RX ORDER — INSULIN LISPRO 100/ML
0 VIAL (ML) SUBCUTANEOUS
Qty: 0 | Refills: 0 | COMMUNITY

## 2017-06-20 RX ORDER — INSULIN LISPRO 100/ML
1 VIAL (ML) SUBCUTANEOUS
Qty: 0 | Refills: 0 | Status: DISCONTINUED | OUTPATIENT
Start: 2017-06-20 | End: 2017-06-20

## 2017-06-20 RX ADMIN — SEVELAMER CARBONATE 2400 MILLIGRAM(S): 2400 POWDER, FOR SUSPENSION ORAL at 18:18

## 2017-06-20 RX ADMIN — Medication 81 MILLIGRAM(S): at 12:28

## 2017-06-20 RX ADMIN — DULOXETINE HYDROCHLORIDE 60 MILLIGRAM(S): 30 CAPSULE, DELAYED RELEASE ORAL at 12:33

## 2017-06-20 RX ADMIN — Medication 100 MILLIGRAM(S): at 06:30

## 2017-06-20 RX ADMIN — Medication 100 MILLIGRAM(S): at 16:09

## 2017-06-20 RX ADMIN — LISINOPRIL 40 MILLIGRAM(S): 2.5 TABLET ORAL at 12:28

## 2017-06-20 RX ADMIN — Medication 50 MILLIGRAM(S): at 06:30

## 2017-06-20 RX ADMIN — SEVELAMER CARBONATE 2400 MILLIGRAM(S): 2400 POWDER, FOR SUSPENSION ORAL at 09:16

## 2017-06-20 RX ADMIN — SEVELAMER CARBONATE 2400 MILLIGRAM(S): 2400 POWDER, FOR SUSPENSION ORAL at 12:30

## 2017-06-20 RX ADMIN — CINACALCET 60 MILLIGRAM(S): 30 TABLET, FILM COATED ORAL at 12:29

## 2017-06-20 NOTE — PROGRESS NOTE ADULT - SUBJECTIVE AND OBJECTIVE BOX
[facility]   [phone]  DIALYSIS NOTE  Chief Complaint: ESRD/Ongoing hemodialysis requirement. seen on hd    24 hour events/subjective:    seen on hd        ALLERGIES & MEDICATIONS  --------------------------------------------------------------------------------  Allergies    No Known Allergies    Intolerances      Standing Inpatient Medications  dextrose 5%. 1000milliLiter(s) IV Continuous <Continuous>  dextrose 50% Injectable 12.5Gram(s) IV Push once  dextrose 50% Injectable 25Gram(s) IV Push once  dextrose 50% Injectable 25Gram(s) IV Push once  heparin  Injectable 5000Unit(s) SubCutaneous every 12 hours  aspirin enteric coated 81milliGRAM(s) Oral daily  lisinopril 40milliGRAM(s) Oral daily  DULoxetine 60milliGRAM(s) Oral daily  atorvastatin 20milliGRAM(s) Oral at bedtime  clopidogrel Tablet 75milliGRAM(s) Oral at bedtime  metoprolol succinate ER 50milliGRAM(s) Oral daily  furosemide    Tablet 40milliGRAM(s) Oral <User Schedule>  cinacalcet 60milliGRAM(s) Oral daily  sevelamer hydrochloride 2400milliGRAM(s) Oral three times a day with meals  hydrALAZINE 100milliGRAM(s) Oral every 8 hours  insulin lispro (HumaLOG) Pump 1Each SubCutaneous Continuous Pump    PRN Inpatient Medications  dextrose Gel 1Dose(s) Oral once PRN  glucagon  Injectable 1milliGRAM(s) IntraMuscular once PRN  oxyCODONE IR 5milliGRAM(s) Oral every 6 hours PRN      REVIEW OF SYSTEMS  --------------------------------------------------------------------------------  no itching or rash  no fever or chill  no cp or palp   no sob or cough   no N/V/D/ no abd pain   ext LLE edema no pain         VITALS/PHYSICAL EXAM  --------------------------------------------------------------------------------  T(C): 36.7, Max: 36.9 (06-19 @ 22:19)  HR: 67 (65 - 72)  BP: 136/61 (124/72 - 167/71)  RR: 18 (18 - 18)  SpO2: 98% (98% - 99%)  Wt(kg): --        I & Os for current day (as of 06-20-17 @ 15:50)  =============================================  IN: 480 ml / OUT: 1000 ml / NET: -520 ml    Physical Exam:  		    	Gen: alert oriented place person and date   	Pulm: cta no rales or ronchi  	CV: RRR, S1/S2  	Abd: +BS, soft, nontender/nondistended  	Extremity: No cyanosis, LLE edema no edema RLE no clubbing  		    LABS/STUDIES  --------------------------------------------------------------------------------                        imp/suggest: ESRD      Hemodialysis Prescription:  	Access:avf  	Dialyzer: revaclear   	Blood Flow (mL/Min): 400  	Dialysate Flow (mL/Min): 600  	Target UF (Liters): 2   	Treatment Time:210   	Potassium: 2  	Calcium: 2.5  	      continue with hd   see hd flow sheet

## 2017-06-20 NOTE — PROGRESS NOTE ADULT - PROBLEM SELECTOR PROBLEM 1
DKA, type 1
Diabetes mellitus type 1
Type 1 diabetes mellitus with complication, uncontrolled
Type 1 diabetes mellitus with ketoacidosis without coma
DKA, type 1
DKA (diabetic ketoacidoses)

## 2017-06-20 NOTE — PROGRESS NOTE ADULT - PROBLEM SELECTOR PROBLEM 8
Peripheral vascular disease

## 2017-06-20 NOTE — DIETITIAN INITIAL EVALUATION ADULT. - ADHERENCE
good/Patient limits carbohydrate servings and portions.  She follows low sodium, low potassium , low phosphorus diet restrictions and limits fluid intakes to 1 liter daily.  Patient aware to eat increased portions of protein at meals. Confirms NKFA. took a MVI Renvela and Novolog pump PA.

## 2017-06-20 NOTE — DIETITIAN INITIAL EVALUATION ADULT. - SOURCE
patient/family/significant other/comprehensive chart/medical record review, including previous RD assessments, RN

## 2017-06-20 NOTE — PROGRESS NOTE ADULT - PROBLEM SELECTOR PROBLEM 5
HTN (hypertension)

## 2017-06-20 NOTE — PROGRESS NOTE ADULT - PROBLEM SELECTOR PLAN 5
- continue home meds, hydralazine, metoprolol
- continue home meds, hydralazine, metoprolo
- will continue with Hydralazin, lisinopril and metoprolol   - monitor for hypotention

## 2017-06-20 NOTE — PROGRESS NOTE ADULT - ATTENDING COMMENTS
Pierce Viera MD pager [phone]
Medically stable. As per resident note. [place] home QA.
agree with resident note  DC planning after HD in am.
agree with above plan
as per resident note. Continue HD  Endocrine f/u re episodes of hypoglycemia, insulin pump adjustments.
61 year old woman insulin dependent diabetes admitted to the hospital with chest pain found to be in DKA secondary to insulin pump dysfunction    DKA has resolved  chronic left leg swelling vascular following  no active ID issues  follow up cardiology recommendations  endocrine to see re programming of new insulin pump  eligible for transfer to floor
Pt seen and examined. Agree with note as above with addendum - pt admits to over-bolusing with breakfast as she did not eat as many CHO as she put in.
61F  PMHX of ACS, CAD, CVA, DM, ESRD, PVD originally admitted for shortness of breath and palpitations during dialysis was found to have a a serum glucose of 504, AGap of 25 a repeat BMP demonstrated a a serum glucose of 688 and an AG of 36 with large acetone. Venous PH 7.25. The patient is currently on insulin pump but suspected for failure. The Patient will be transferred to the MICU for further management with insulin gtt. Renal follow UP.

## 2017-06-20 NOTE — DIETITIAN INITIAL EVALUATION ADULT. - NS AS NUTRI INTERV MEALS SNACK
1)  Obtain and honor meal preferences   2) Encourage adequate po intakes at meals 3) Continue current diet as ordered./Composition of meals/snacks

## 2017-06-20 NOTE — PROGRESS NOTE ADULT - PROBLEM SELECTOR PLAN 8
- no cellulitis  - vascular consult - chronic edema nothing to do, outpatient Follow up
- no cellulitis  - vascular consult - chronic edema nothing to do, outpatient Follow up
- no cellulitis  - vascular consult- chronic edema nothing to do, outpatient Follow up
- no cellulitis  - vascular consult- chronic edema nothing to do, outpatient Follow up
Follow up vascular consult for worsening Lt LE edema- chronic issue no acute change  - no cellulitis  - normal pulses

## 2017-06-20 NOTE — PROGRESS NOTE ADULT - PROBLEM SELECTOR PROBLEM 4
ESRD (end stage renal disease)
Need for prophylactic measure
SOB (shortness of breath)

## 2017-06-20 NOTE — PROGRESS NOTE ADULT - PROBLEM SELECTOR PLAN 2
- no longer complaining of chest pain/SOB  - continue ASA/Plavix  - cardiology signed off, repeat echo stable  - no further work up at this time

## 2017-06-20 NOTE — DIETITIAN INITIAL EVALUATION ADULT. - ORAL INTAKE PTA
good/Patient reports having a good appetite PTA and eating 3 meals daily.  Per previous RD note (6/9/17) Pt reported consuming eggs and toast with low sodium pacheco at breakfast;  tuna fish or low sodium ham sandwich at lunch and chicken with noodles or hamburger at dinner.  She will snack on diet cookies, diet cake or grapes

## 2017-06-20 NOTE — PROGRESS NOTE ADULT - PROBLEM SELECTOR PLAN 3
- continue ASA/Plavix  - cards recs
- continue ASA/Plavix  - cards recs
- continue asa/plavis   - cards recs
- Continue with ASA - Plavix and Statain   - Echo Per routine
- likely 2/2 DKA  - Zofran STAT, PRN  - control hyperglycemia

## 2017-06-20 NOTE — PROGRESS NOTE ADULT - PROBLEM SELECTOR PLAN 1
- s/p MICU admission, AG closed  - patient restarted on insulin pump at time of discharge from MICU  - FSBG low this morning at 75  - f/u endocrinology recs re: pump settings as basal rate may need to be adjusted  - f/u further endocrine recommendations

## 2017-06-20 NOTE — PROGRESS NOTE ADULT - PROBLEM SELECTOR PLAN 4
- HD today
DVT - heparin SC 5000units q 8
- Likely 2/2 DKA  - supplemental 02 for stat > 93%  - control hyperglycemia

## 2017-06-20 NOTE — PROGRESS NOTE ADULT - PROBLEM SELECTOR PLAN 1
-Adjusted patient's basal rates to try avoid am hypoglycemia. New rates are:  12am-5am: 0.3 and 5am-12am: 0.425 units/hour.  -f/u with her endocrinologist Dr. Ley in July.

## 2017-06-20 NOTE — PROGRESS NOTE ADULT - SUBJECTIVE AND OBJECTIVE BOX
Patient is a 61y old  Female who presents with a chief complaint of pain  and swelling in left leg (19 Jun 2017 11:21)      SUBJECTIVE / OVERNIGHT EVENTS:  This morning, patient with low FSBG of 75. Patient reports she always has a difficult time controlling her FSBG. Seen by endocrinology yesterday for over-bolusing herself with breakfast causing a pre-lunch hypoglycemic episode. Patient denies tremors, diaphoresis, nausea, vomiting, fevers, chills, CP or SOB at this time. Has been eating all of her meals fully.    MEDICATIONS  (STANDING):  dextrose 5%. 1000milliLiter(s) IV Continuous <Continuous>  dextrose 50% Injectable 12.5Gram(s) IV Push once  dextrose 50% Injectable 25Gram(s) IV Push once  dextrose 50% Injectable 25Gram(s) IV Push once  heparin  Injectable 5000Unit(s) SubCutaneous every 12 hours  aspirin enteric coated 81milliGRAM(s) Oral daily  lisinopril 40milliGRAM(s) Oral daily  DULoxetine 60milliGRAM(s) Oral daily  atorvastatin 20milliGRAM(s) Oral at bedtime  clopidogrel Tablet 75milliGRAM(s) Oral at bedtime  metoprolol succinate ER 50milliGRAM(s) Oral daily  furosemide    Tablet 40milliGRAM(s) Oral <User Schedule>  cinacalcet 60milliGRAM(s) Oral daily  sevelamer hydrochloride 2400milliGRAM(s) Oral three times a day with meals  hydrALAZINE 100milliGRAM(s) Oral every 8 hours  insulin lispro (HumaLOG) Pump 1Each SubCutaneous Continuous Pump    MEDICATIONS  (PRN):  dextrose Gel 1Dose(s) Oral once PRN Blood Glucose LESS THAN 70 milliGRAM(s)/deciLiter  glucagon  Injectable 1milliGRAM(s) IntraMuscular once PRN Glucose <70 milliGRAM(s)/deciLiter  oxyCODONE IR 5milliGRAM(s) Oral every 6 hours PRN Moderate Pain (4 - 6)      Vital Signs Last 24 Hrs  T(C): 36.9, Max: 36.9 (06-19 @ 22:19)  HR: 72 (65 - 73)  BP: 134/68 (124/72 - 167/84)  RR: 18 (18 - 18)  SpO2: 99% (96% - 99%)  Wt(kg): --  CAPILLARY BLOOD GLUCOSE  75 (20 Jun 2017 06:13)  154 (20 Jun 2017 01:54)  99 (19 Jun 2017 22:19)  131 (19 Jun 2017 19:04)  107 (19 Jun 2017 13:46)  88 (19 Jun 2017 12:55)  74 (19 Jun 2017 12:40)  63 (19 Jun 2017 12:26)  62 (19 Jun 2017 12:25)  116 (19 Jun 2017 08:40)    I&O's Summary    I & Os for current day (as of 20 Jun 2017 07:59)  =============================================  IN: 480 ml / OUT: 1000 ml / NET: -520 ml      PHYSICAL EXAM:  GENERAL: NAD, well-developed  HEAD:  Atraumatic, Normocephalic  EYES: EOMI, PERRLA, conjunctiva and sclera clear  NECK: Supple, No JVD  CHEST/LUNG: Clear to auscultation bilaterally; No wheeze  HEART: Regular rate and rhythm; No murmurs, rubs, or gallops  ABDOMEN: Soft, Nontender, Nondistended; Bowel sounds present  EXTREMITIES: no clubbing, cyanosis, or edema  PSYCH: AAOx3  NEUROLOGY: non-focal  SKIN: No rashes or lesions    LABS:    RADIOLOGY & ADDITIONAL TESTS:    Imaging Personally Reviewed:    Consultant(s) Notes Reviewed:      Care Discussed with Consultants/Other Providers:

## 2017-06-20 NOTE — DIETITIAN INITIAL EVALUATION ADULT. - NS AS NUTRI INTERV ED CONTENT
Recommended modifications/Purpose of the nutrition education/Priority modifications/1) Reviewed T1DM Nutrition Therapy Handout. Discussed balanced meal pattern, monitoring portion sizes, carbohydrate counting, including protein at each meal and snack, and limiting concentrated sweets. Reinforced importance of self-monitoring blood glucose levels. 2) Nutrition therapy for ESRD/dialysis reviewed, including: increased protein needs, including protein rich foods at every meal/snack; limiting salt/sodium intake (reviewed foods containing sodium); limiting phosphorous intake (reviewed foods containing phosphorous); limiting potassium intake (reviewed foods containing potassium). Encouraged pt to follow-up with outpatient/dialysis center RD. Pt verbalized understanding and was amenable to suggestions. RD to remain available as requested./Nutrition relationship to health/disease

## 2017-06-20 NOTE — PROGRESS NOTE ADULT - PROBLEM SELECTOR PROBLEM 3
CAD (coronary artery disease)
Nausea and vomiting due to hyperglycemia

## 2017-06-20 NOTE — DIETITIAN INITIAL EVALUATION ADULT. - OTHER INFO
Nutrition consult for A1c >7% received and appreciated. Patient reports her appetite and meal intakes have been good since her admission, consuming % of meals.  Patient has dentures, however, reports not wearing them most of the time.  She is able to manage chewing most foods without difficulty.  Denies having any swallowing difficulty and no nausea/emesis or GI discomfort.  Patient reports her UBW to be 131 lbs, noted c admission dosing Wt 131.3lbs, weighed 122.3lbs yesterday. ? accuracy of scale vs fluid shifts.  Patient monitors glucose levels 5 times daily and reports results have been 100 - 200 in recent months.  Patient verbalizes knowledge and understanding of diabetic and renal diet recommendations, accepted verbal diet review but declined written educational materials.  RD offered oral supplements and patient declined these.

## 2017-06-20 NOTE — PROGRESS NOTE ADULT - SUBJECTIVE AND OBJECTIVE BOX
S: Pt's blood sugars have been low normal despite her eating well and having a good appetite. She is ready for discharge after HD.    MEDICATIONS  (STANDING):  dextrose 5%. 1000milliLiter(s) IV Continuous <Continuous>  dextrose 50% Injectable 12.5Gram(s) IV Push once  dextrose 50% Injectable 25Gram(s) IV Push once  dextrose 50% Injectable 25Gram(s) IV Push once  heparin  Injectable 5000Unit(s) SubCutaneous every 12 hours  aspirin enteric coated 81milliGRAM(s) Oral daily  lisinopril 40milliGRAM(s) Oral daily  DULoxetine 60milliGRAM(s) Oral daily  atorvastatin 20milliGRAM(s) Oral at bedtime  clopidogrel Tablet 75milliGRAM(s) Oral at bedtime  metoprolol succinate ER 50milliGRAM(s) Oral daily  furosemide    Tablet 40milliGRAM(s) Oral <User Schedule>  cinacalcet 60milliGRAM(s) Oral daily  sevelamer hydrochloride 2400milliGRAM(s) Oral three times a day with meals  hydrALAZINE 100milliGRAM(s) Oral every 8 hours  insulin lispro (HumaLOG) Pump 1Each SubCutaneous Continuous Pump    MEDICATIONS  (PRN):  dextrose Gel 1Dose(s) Oral once PRN Blood Glucose LESS THAN 70 milliGRAM(s)/deciLiter  glucagon  Injectable 1milliGRAM(s) IntraMuscular once PRN Glucose <70 milliGRAM(s)/deciLiter  oxyCODONE IR 5milliGRAM(s) Oral every 6 hours PRN Moderate Pain (4 - 6)      PHYSICAL EXAM:  VITALS: T(C): 36.7  T(F): 98, Max: 98.5 (06-19 @ 22:19)  HR: 67 (65 - 72)  BP: 136/61 (124/72 - 167/84)  RR:  (18 - 18)  SpO2:  (96% - 99%)  Wt(kg): --  GENERAL: NAD, well-groomed, well-developed  RESPIRATORY: Clear to auscultation bilaterally; No rales, rhonchi, wheezing, or rubs  CARDIOVASCULAR: Regular rate and rhythm; 3/6 BC over L-side of chest  GI: Soft, nontender, non distended, normal bowel sounds  Skin: catheter in LLQ - no eyrthema/exudate    CAPILLARY BLOOD GLUCOSE  83 (06-20 @ 12:27)  119 (06-20 @ 08:33)  75 (06-20 @ 06:13)  154 (06-20 @ 01:54)  99 (06-19 @ 22:19)  131 (06-19 @ 19:04)  107 (06-19 @ 13:46)  88 (06-19 @ 12:55)  74 (06-19 @ 12:40)  63 (06-19 @ 12:26)  62 (06-19 @ 12:25)  116 (06-19 @ 08:40)  120 (06-19 @ 06:26)  100 (06-19 @ 02:02)  120 (06-18 @ 22:29)  81 (06-18 @ 18:01)  244 (06-18 @ 12:49)  128 (06-18 @ 09:44)  113 (06-18 @ 08:00)  129 (06-18 @ 05:00)  130 (06-18 @ 01:00)  98 (06-17 @ 21:00)  130 (06-17 @ 17:00)      06-18    141  |  99  |  19  ----------------------------<  147<H>  4.6   |  30  |  4.25<H>    EGFR if : 12<L>  EGFR if non : 11<L>    Ca    7.9<L>      06-18  Mg     2.3     06-18  Phos  2.5     06-18    Hemoglobin A1C, Whole Blood: 7.3 % <H> [4.0 - 5.6] (06-16 @ 04:45)  Hemoglobin A1C, Whole Blood: 8.5 % <H> [4.0 - 5.6] (04-11 @ 07:12)

## 2017-06-20 NOTE — PROGRESS NOTE ADULT - ASSESSMENT
60 yo F w/PMH of T1DM on insulin pump, CAD, PAD, HTN, HLD, ESRD on HD, chronic pain initially presented with chest pain now resolved found to be in DKA, admitted to MICU for insulin drip, now transferred to the floor for further management, insulin pump restarted, now with several hypoglycemic episodes

## 2017-06-30 ENCOUNTER — APPOINTMENT (OUTPATIENT)
Dept: MRI IMAGING | Facility: IMAGING CENTER | Age: 62
End: 2017-06-30

## 2017-06-30 ENCOUNTER — OUTPATIENT (OUTPATIENT)
Dept: OUTPATIENT SERVICES | Facility: HOSPITAL | Age: 62
LOS: 1 days | End: 2017-06-30

## 2017-06-30 ENCOUNTER — INPATIENT (INPATIENT)
Facility: HOSPITAL | Age: 62
LOS: 5 days | Discharge: HOME | DRG: 637 | End: 2017-07-06
Attending: INTERNAL MEDICINE | Admitting: INTERNAL MEDICINE
Payer: MEDICARE

## 2017-06-30 VITALS
TEMPERATURE: 99 F | RESPIRATION RATE: 20 BRPM | SYSTOLIC BLOOD PRESSURE: 190 MMHG | OXYGEN SATURATION: 99 % | HEART RATE: 80 BPM | DIASTOLIC BLOOD PRESSURE: 76 MMHG

## 2017-06-30 DIAGNOSIS — Z98.89 OTHER SPECIFIED POSTPROCEDURAL STATES: Chronic | ICD-10-CM

## 2017-06-30 DIAGNOSIS — E10.65 TYPE 1 DIABETES MELLITUS WITH HYPERGLYCEMIA: ICD-10-CM

## 2017-06-30 DIAGNOSIS — Z00.8 ENCOUNTER FOR OTHER GENERAL EXAMINATION: ICD-10-CM

## 2017-06-30 LAB
ALBUMIN SERPL ELPH-MCNC: 4.1 G/DL — SIGNIFICANT CHANGE UP (ref 3.3–5)
ALP SERPL-CCNC: 252 U/L — HIGH (ref 40–120)
ALT FLD-CCNC: 39 U/L RC — SIGNIFICANT CHANGE UP (ref 10–45)
ANION GAP SERPL CALC-SCNC: 21 MMOL/L — HIGH (ref 5–17)
AST SERPL-CCNC: 61 U/L — HIGH (ref 10–40)
BILIRUB SERPL-MCNC: 0.4 MG/DL — SIGNIFICANT CHANGE UP (ref 0.2–1.2)
BUN SERPL-MCNC: 53 MG/DL — HIGH (ref 7–23)
CALCIUM SERPL-MCNC: 7.6 MG/DL — LOW (ref 8.4–10.5)
CHLORIDE SERPL-SCNC: 85 MMOL/L — LOW (ref 96–108)
CO2 SERPL-SCNC: 23 MMOL/L — SIGNIFICANT CHANGE UP (ref 22–31)
CREAT SERPL-MCNC: 6.81 MG/DL — HIGH (ref 0.5–1.3)
GAS PNL BLDV: SIGNIFICANT CHANGE UP
GLUCOSE SERPL-MCNC: 674 MG/DL — CRITICAL HIGH (ref 70–99)
HCT VFR BLD CALC: 24.1 % — LOW (ref 34.5–45)
HGB BLD-MCNC: 8.3 G/DL — LOW (ref 11.5–15.5)
MAGNESIUM SERPL-MCNC: 2.1 MG/DL — SIGNIFICANT CHANGE UP (ref 1.6–2.6)
MCHC RBC-ENTMCNC: 34.5 GM/DL — SIGNIFICANT CHANGE UP (ref 32–36)
MCHC RBC-ENTMCNC: 35.4 PG — HIGH (ref 27–34)
MCV RBC AUTO: 103 FL — HIGH (ref 80–100)
PHOSPHATE SERPL-MCNC: 4.7 MG/DL — HIGH (ref 2.5–4.5)
PLATELET # BLD AUTO: 224 K/UL — SIGNIFICANT CHANGE UP (ref 150–400)
POTASSIUM SERPL-MCNC: 6 MMOL/L — HIGH (ref 3.5–5.3)
POTASSIUM SERPL-SCNC: 6 MMOL/L — HIGH (ref 3.5–5.3)
PROT SERPL-MCNC: 7.1 G/DL — SIGNIFICANT CHANGE UP (ref 6–8.3)
RBC # BLD: 2.35 M/UL — LOW (ref 3.8–5.2)
RBC # FLD: 13 % — SIGNIFICANT CHANGE UP (ref 10.3–14.5)
SODIUM SERPL-SCNC: 129 MMOL/L — LOW (ref 135–145)
WBC # BLD: 5.6 K/UL — SIGNIFICANT CHANGE UP (ref 3.8–10.5)
WBC # FLD AUTO: 5.6 K/UL — SIGNIFICANT CHANGE UP (ref 3.8–10.5)

## 2017-06-30 PROCEDURE — 99291 CRITICAL CARE FIRST HOUR: CPT | Mod: 25,GC

## 2017-06-30 PROCEDURE — 93010 ELECTROCARDIOGRAM REPORT: CPT

## 2017-06-30 PROCEDURE — 71010: CPT | Mod: 26

## 2017-06-30 RX ORDER — ALBUTEROL 90 UG/1
2.5 AEROSOL, METERED ORAL ONCE
Qty: 0 | Refills: 0 | Status: COMPLETED | OUTPATIENT
Start: 2017-06-30 | End: 2017-06-30

## 2017-06-30 RX ORDER — SODIUM BICARBONATE 1 MEQ/ML
50 SYRINGE (ML) INTRAVENOUS ONCE
Qty: 0 | Refills: 0 | Status: COMPLETED | OUTPATIENT
Start: 2017-06-30 | End: 2017-06-30

## 2017-06-30 RX ORDER — INSULIN HUMAN 100 [IU]/ML
10 INJECTION, SOLUTION SUBCUTANEOUS ONCE
Qty: 0 | Refills: 0 | Status: COMPLETED | OUTPATIENT
Start: 2017-06-30 | End: 2017-06-30

## 2017-06-30 RX ORDER — OXYCODONE HYDROCHLORIDE 5 MG/1
5 TABLET ORAL ONCE
Qty: 0 | Refills: 0 | Status: DISCONTINUED | OUTPATIENT
Start: 2017-06-30 | End: 2017-06-30

## 2017-06-30 RX ORDER — DIPHENHYDRAMINE HCL 50 MG
50 CAPSULE ORAL ONCE
Qty: 0 | Refills: 0 | Status: COMPLETED | OUTPATIENT
Start: 2017-06-30 | End: 2017-06-30

## 2017-06-30 RX ORDER — CALCIUM GLUCONATE 100 MG/ML
1 VIAL (ML) INTRAVENOUS ONCE
Qty: 1 | Refills: 0 | Status: COMPLETED | OUTPATIENT
Start: 2017-06-30 | End: 2017-06-30

## 2017-06-30 RX ORDER — INSULIN GLARGINE 100 [IU]/ML
10 INJECTION, SOLUTION SUBCUTANEOUS AT BEDTIME
Qty: 0 | Refills: 0 | Status: DISCONTINUED | OUTPATIENT
Start: 2017-06-30 | End: 2017-07-01

## 2017-06-30 RX ORDER — CALCIUM GLUCONATE 100 MG/ML
2 VIAL (ML) INTRAVENOUS ONCE
Qty: 2 | Refills: 0 | Status: DISCONTINUED | OUTPATIENT
Start: 2017-06-30 | End: 2017-06-30

## 2017-06-30 RX ORDER — INSULIN LISPRO 100/ML
6 VIAL (ML) SUBCUTANEOUS ONCE
Qty: 0 | Refills: 0 | Status: COMPLETED | OUTPATIENT
Start: 2017-06-30 | End: 2017-06-30

## 2017-06-30 RX ORDER — SODIUM CHLORIDE 9 MG/ML
1000 INJECTION INTRAMUSCULAR; INTRAVENOUS; SUBCUTANEOUS ONCE
Qty: 0 | Refills: 0 | Status: COMPLETED | OUTPATIENT
Start: 2017-06-30 | End: 2017-06-30

## 2017-06-30 RX ADMIN — Medication 6 UNIT(S): at 23:48

## 2017-06-30 RX ADMIN — Medication 200 GRAM(S): at 23:50

## 2017-06-30 RX ADMIN — INSULIN GLARGINE 10 UNIT(S): 100 INJECTION, SOLUTION SUBCUTANEOUS at 23:45

## 2017-06-30 RX ADMIN — OXYCODONE HYDROCHLORIDE 5 MILLIGRAM(S): 5 TABLET ORAL at 23:55

## 2017-06-30 RX ADMIN — OXYCODONE HYDROCHLORIDE 5 MILLIGRAM(S): 5 TABLET ORAL at 22:07

## 2017-06-30 RX ADMIN — INSULIN HUMAN 10 UNIT(S): 100 INJECTION, SOLUTION SUBCUTANEOUS at 23:43

## 2017-06-30 RX ADMIN — SODIUM CHLORIDE 1000 MILLILITER(S): 9 INJECTION INTRAMUSCULAR; INTRAVENOUS; SUBCUTANEOUS at 23:37

## 2017-06-30 RX ADMIN — ALBUTEROL 2.5 MILLIGRAM(S): 90 AEROSOL, METERED ORAL at 23:49

## 2017-06-30 RX ADMIN — Medication 150 MILLIGRAM(S): at 22:07

## 2017-06-30 RX ADMIN — Medication 50 MILLIGRAM(S): at 22:07

## 2017-06-30 RX ADMIN — Medication 50 MILLIEQUIVALENT(S): at 23:51

## 2017-06-30 NOTE — ED PROVIDER NOTE - ATTENDING CONTRIBUTION TO CARE
Pt with chronic LLE leg pain with swelling, states lyrica and neurontin is intolerable.  Unusual affect, pulses intact distally, LLE swollen compared to right, no cellulitis.

## 2017-06-30 NOTE — ED ADULT NURSE NOTE - OBJECTIVE STATEMENT
60 yo female A&OX3 presents to the ED with the c/o l leg pain and swelling. Pt states that she has been having pain x 1 year. Pt states that she was recently discharged from the hospital, stating that she is having increase pain in the l leg. L leg has + 2 pitting edema. HX of DM, no numbness or tingling. Lungs clear, equal b/l, no cough of sob. MAEX4

## 2017-06-30 NOTE — ED PROVIDER NOTE - CHIEF COMPLAINT
The patient is a 61y Female complaining of The patient is a 61y Female complaining of severe LLE pain

## 2017-06-30 NOTE — ED PROVIDER NOTE - CARE PLAN
Principal Discharge DX:	Neuropathic pain of lower extremity, left Principal Discharge DX:	Hyperglycemia due to type 1 diabetes mellitus  Secondary Diagnosis:	Neuropathic pain of lower extremity, left  Secondary Diagnosis:	Hyperkalemia

## 2017-06-30 NOTE — ED ADULT NURSE NOTE - ED STAT RN HANDOFF DETAILS
Report received from alpesh CARMONA. Patient has K of 6, potassium lowering meds to be given asap. A&Ox3,  at bedside. Safety provided. Will continue to monitor.

## 2017-06-30 NOTE — ED PROVIDER NOTE - NOTES
Instructed to immediately remove pump, give 10 lantus now, along with 6 humalog. Pt to have Q6H FS and repeat finger stick in 2 hours. Nephrology c/s for hyperkalemia in ESRD pt, instructed to treat hyperkalemia and renal to see pt in a.m.

## 2017-06-30 NOTE — ED PROVIDER NOTE - PROGRESS NOTE DETAILS
Dr. Mims Note: had long conversation with patient and  about her poor sugar control and neuropathic pain relationship.  Pt eating candy, not managing her insulin pump well.  Blood sugars on pump noted 400s for several days.  Called endocrine for pump management.  Called nephrologist covering for Dr. Schmidt.  Treating hyperkalemia.  Stable for admission.

## 2017-06-30 NOTE — ED PROVIDER NOTE - CRITICAL CARE PROVIDED
consultation with other physicians/documentation/consult w/ pt's family directly relating to pts condition/interpretation of diagnostic studies/additional history taking/direct patient care (not related to procedure)

## 2017-06-30 NOTE — ED ADULT NURSE NOTE - NS ED NURSE REPORT GIVEN TO FT
Tori CARMONA [facility] Tori CARMONA 3D, informed of fingerstick at 0100 and repeat BMP at 0400 to recheck potassium

## 2017-06-30 NOTE — ED PROVIDER NOTE - OBJECTIVE STATEMENT
Pt is 61F PMH DMII (A1C 7.3), CAD, PAD, HTN, HLD p/w severe LLE pain and edema. Pt has had chronic LLE edema for one year now, ever since her fem bipass performed by vascular. Over the past week, pt has had severe increasing pain in LLE, along with worsening swelling. Pt presented to vascular earlier this week for increased pain who ordered an MRI of spine. Pt denies fevers, chills, nausea, vomiting, cough, night sweats malaise

## 2017-06-30 NOTE — ED PROVIDER NOTE - MEDICAL DECISION MAKING DETAILS
Dr. Mims Note: chronic neuropathic pain of leg....trial neuropathic agent, check BS, reassess 61F ESRD on HD, DMI with hyperglycemia, p/w severe LLE pain, found to have hyperglycemia, hyperkalemia. Treating HyperK, placed renal c/s for HD tomorrow, removing pump and treating DMI as rec by endocrine. Pt will require admission.  Dr. Mmis Note: chronic neuropathic pain of leg....trial neuropathic agent, check BS, reassess

## 2017-07-01 DIAGNOSIS — E10.65 TYPE 1 DIABETES MELLITUS WITH HYPERGLYCEMIA: ICD-10-CM

## 2017-07-01 DIAGNOSIS — E10.22 TYPE 1 DIABETES MELLITUS WITH DIABETIC CHRONIC KIDNEY DISEASE: ICD-10-CM

## 2017-07-01 DIAGNOSIS — D75.89 OTHER SPECIFIED DISEASES OF BLOOD AND BLOOD-FORMING ORGANS: ICD-10-CM

## 2017-07-01 DIAGNOSIS — R60.0 LOCALIZED EDEMA: ICD-10-CM

## 2017-07-01 DIAGNOSIS — J81.0 ACUTE PULMONARY EDEMA: ICD-10-CM

## 2017-07-01 DIAGNOSIS — E87.5 HYPERKALEMIA: ICD-10-CM

## 2017-07-01 DIAGNOSIS — N18.6 END STAGE RENAL DISEASE: ICD-10-CM

## 2017-07-01 LAB
ANION GAP SERPL CALC-SCNC: 21 MMOL/L — HIGH (ref 5–17)
ANION GAP SERPL CALC-SCNC: 23 MMOL/L — HIGH (ref 5–17)
B-OH-BUTYR SERPL-SCNC: 0.1 MMOL/L — SIGNIFICANT CHANGE UP
BASOPHILS # BLD AUTO: 0 K/UL — SIGNIFICANT CHANGE UP (ref 0–0.2)
BASOPHILS NFR BLD AUTO: 0.5 % — SIGNIFICANT CHANGE UP (ref 0–2)
BUN SERPL-MCNC: 56 MG/DL — HIGH (ref 7–23)
BUN SERPL-MCNC: 58 MG/DL — HIGH (ref 7–23)
CALCIUM SERPL-MCNC: 8.2 MG/DL — LOW (ref 8.4–10.5)
CALCIUM SERPL-MCNC: 8.5 MG/DL — SIGNIFICANT CHANGE UP (ref 8.4–10.5)
CHLORIDE SERPL-SCNC: 90 MMOL/L — LOW (ref 96–108)
CHLORIDE SERPL-SCNC: 91 MMOL/L — LOW (ref 96–108)
CO2 SERPL-SCNC: 22 MMOL/L — SIGNIFICANT CHANGE UP (ref 22–31)
CO2 SERPL-SCNC: 25 MMOL/L — SIGNIFICANT CHANGE UP (ref 22–31)
CREAT SERPL-MCNC: 7.02 MG/DL — HIGH (ref 0.5–1.3)
CREAT SERPL-MCNC: 7.38 MG/DL — HIGH (ref 0.5–1.3)
EOSINOPHIL # BLD AUTO: 0.1 K/UL — SIGNIFICANT CHANGE UP (ref 0–0.5)
EOSINOPHIL NFR BLD AUTO: 2.2 % — SIGNIFICANT CHANGE UP (ref 0–6)
FOLATE SERPL-MCNC: 13.8 NG/ML — SIGNIFICANT CHANGE UP (ref 4.8–24.2)
GAS PNL BLDV: SIGNIFICANT CHANGE UP
GLUCOSE SERPL-MCNC: 288 MG/DL — HIGH (ref 70–99)
GLUCOSE SERPL-MCNC: 90 MG/DL — SIGNIFICANT CHANGE UP (ref 70–99)
HAPTOGLOB SERPL-MCNC: 250 MG/DL — HIGH (ref 34–200)
HCT VFR BLD CALC: 21.7 % — LOW (ref 34.5–45)
HGB BLD-MCNC: 7.8 G/DL — LOW (ref 11.5–15.5)
LDH SERPL L TO P-CCNC: 226 U/L — SIGNIFICANT CHANGE UP (ref 50–242)
LYMPHOCYTES # BLD AUTO: 0.8 K/UL — LOW (ref 1–3.3)
LYMPHOCYTES # BLD AUTO: 17.1 % — SIGNIFICANT CHANGE UP (ref 13–44)
MAGNESIUM SERPL-MCNC: 2 MG/DL — SIGNIFICANT CHANGE UP (ref 1.6–2.6)
MCHC RBC-ENTMCNC: 36.3 GM/DL — HIGH (ref 32–36)
MCHC RBC-ENTMCNC: 36.9 PG — HIGH (ref 27–34)
MCV RBC AUTO: 102 FL — HIGH (ref 80–100)
MONOCYTES # BLD AUTO: 0.4 K/UL — SIGNIFICANT CHANGE UP (ref 0–0.9)
MONOCYTES NFR BLD AUTO: 7.9 % — SIGNIFICANT CHANGE UP (ref 2–14)
NEUTROPHILS # BLD AUTO: 3.4 K/UL — SIGNIFICANT CHANGE UP (ref 1.8–7.4)
NEUTROPHILS NFR BLD AUTO: 72.3 % — SIGNIFICANT CHANGE UP (ref 43–77)
PHOSPHATE SERPL-MCNC: 5.4 MG/DL — HIGH (ref 2.5–4.5)
PLATELET # BLD AUTO: 195 K/UL — SIGNIFICANT CHANGE UP (ref 150–400)
POTASSIUM SERPL-MCNC: 4.5 MMOL/L — SIGNIFICANT CHANGE UP (ref 3.5–5.3)
POTASSIUM SERPL-MCNC: 4.6 MMOL/L — SIGNIFICANT CHANGE UP (ref 3.5–5.3)
POTASSIUM SERPL-SCNC: 4.5 MMOL/L — SIGNIFICANT CHANGE UP (ref 3.5–5.3)
POTASSIUM SERPL-SCNC: 4.6 MMOL/L — SIGNIFICANT CHANGE UP (ref 3.5–5.3)
RBC # BLD: 2.13 M/UL — LOW (ref 3.8–5.2)
RBC # BLD: 2.19 M/UL — LOW (ref 3.8–5.2)
RBC # FLD: 12.9 % — SIGNIFICANT CHANGE UP (ref 10.3–14.5)
RETICS #: 50.7 K/UL — SIGNIFICANT CHANGE UP (ref 25–125)
RETICS/RBC NFR: 2.3 % — SIGNIFICANT CHANGE UP (ref 0.5–2.5)
SODIUM SERPL-SCNC: 135 MMOL/L — SIGNIFICANT CHANGE UP (ref 135–145)
SODIUM SERPL-SCNC: 137 MMOL/L — SIGNIFICANT CHANGE UP (ref 135–145)
VIT B12 SERPL-MCNC: 652 PG/ML — SIGNIFICANT CHANGE UP (ref 243–894)
WBC # BLD: 4.7 K/UL — SIGNIFICANT CHANGE UP (ref 3.8–10.5)
WBC # FLD AUTO: 4.7 K/UL — SIGNIFICANT CHANGE UP (ref 3.8–10.5)

## 2017-07-01 PROCEDURE — 99223 1ST HOSP IP/OBS HIGH 75: CPT

## 2017-07-01 PROCEDURE — 99223 1ST HOSP IP/OBS HIGH 75: CPT | Mod: GC

## 2017-07-01 RX ORDER — CLOPIDOGREL BISULFATE 75 MG/1
75 TABLET, FILM COATED ORAL AT BEDTIME
Qty: 0 | Refills: 0 | Status: DISCONTINUED | OUTPATIENT
Start: 2017-07-01 | End: 2017-07-06

## 2017-07-01 RX ORDER — LISINOPRIL 2.5 MG/1
40 TABLET ORAL DAILY
Qty: 0 | Refills: 0 | Status: DISCONTINUED | OUTPATIENT
Start: 2017-07-01 | End: 2017-07-06

## 2017-07-01 RX ORDER — FUROSEMIDE 40 MG
40 TABLET ORAL
Qty: 0 | Refills: 0 | Status: DISCONTINUED | OUTPATIENT
Start: 2017-07-01 | End: 2017-07-02

## 2017-07-01 RX ORDER — CINACALCET 30 MG/1
60 TABLET, FILM COATED ORAL DAILY
Qty: 0 | Refills: 0 | Status: DISCONTINUED | OUTPATIENT
Start: 2017-07-01 | End: 2017-07-06

## 2017-07-01 RX ORDER — DEXTROSE 50 % IN WATER 50 %
1 SYRINGE (ML) INTRAVENOUS ONCE
Qty: 0 | Refills: 0 | Status: DISCONTINUED | OUTPATIENT
Start: 2017-07-01 | End: 2017-07-06

## 2017-07-01 RX ORDER — ASPIRIN/CALCIUM CARB/MAGNESIUM 324 MG
81 TABLET ORAL DAILY
Qty: 0 | Refills: 0 | Status: DISCONTINUED | OUTPATIENT
Start: 2017-07-01 | End: 2017-07-06

## 2017-07-01 RX ORDER — OXYCODONE HYDROCHLORIDE 5 MG/1
5 TABLET ORAL ONCE
Qty: 0 | Refills: 0 | Status: DISCONTINUED | OUTPATIENT
Start: 2017-07-01 | End: 2017-07-01

## 2017-07-01 RX ORDER — INSULIN LISPRO 100/ML
1 VIAL (ML) SUBCUTANEOUS
Qty: 0 | Refills: 0 | Status: DISCONTINUED | OUTPATIENT
Start: 2017-07-01 | End: 2017-07-06

## 2017-07-01 RX ORDER — DEXTROSE 50 % IN WATER 50 %
25 SYRINGE (ML) INTRAVENOUS ONCE
Qty: 0 | Refills: 0 | Status: DISCONTINUED | OUTPATIENT
Start: 2017-07-01 | End: 2017-07-06

## 2017-07-01 RX ORDER — INSULIN LISPRO 100/ML
VIAL (ML) SUBCUTANEOUS
Qty: 0 | Refills: 0 | Status: COMPLETED | OUTPATIENT
Start: 2017-07-01 | End: 2017-07-01

## 2017-07-01 RX ORDER — GLUCAGON INJECTION, SOLUTION 0.5 MG/.1ML
1 INJECTION, SOLUTION SUBCUTANEOUS ONCE
Qty: 0 | Refills: 0 | Status: DISCONTINUED | OUTPATIENT
Start: 2017-07-01 | End: 2017-07-06

## 2017-07-01 RX ORDER — SODIUM CHLORIDE 9 MG/ML
1000 INJECTION, SOLUTION INTRAVENOUS
Qty: 0 | Refills: 0 | Status: DISCONTINUED | OUTPATIENT
Start: 2017-07-01 | End: 2017-07-06

## 2017-07-01 RX ORDER — INSULIN LISPRO 100/ML
3 VIAL (ML) SUBCUTANEOUS
Qty: 0 | Refills: 0 | Status: COMPLETED | OUTPATIENT
Start: 2017-07-01 | End: 2017-07-01

## 2017-07-01 RX ORDER — HEPARIN SODIUM 5000 [USP'U]/ML
5000 INJECTION INTRAVENOUS; SUBCUTANEOUS EVERY 12 HOURS
Qty: 0 | Refills: 0 | Status: DISCONTINUED | OUTPATIENT
Start: 2017-07-01 | End: 2017-07-06

## 2017-07-01 RX ORDER — HYDRALAZINE HCL 50 MG
100 TABLET ORAL EVERY 8 HOURS
Qty: 0 | Refills: 0 | Status: DISCONTINUED | OUTPATIENT
Start: 2017-07-01 | End: 2017-07-06

## 2017-07-01 RX ORDER — DEXTROSE 50 % IN WATER 50 %
12.5 SYRINGE (ML) INTRAVENOUS ONCE
Qty: 0 | Refills: 0 | Status: DISCONTINUED | OUTPATIENT
Start: 2017-07-01 | End: 2017-07-06

## 2017-07-01 RX ORDER — FUROSEMIDE 40 MG
40 TABLET ORAL ONCE
Qty: 0 | Refills: 0 | Status: COMPLETED | OUTPATIENT
Start: 2017-07-01 | End: 2017-07-01

## 2017-07-01 RX ORDER — ATORVASTATIN CALCIUM 80 MG/1
20 TABLET, FILM COATED ORAL AT BEDTIME
Qty: 0 | Refills: 0 | Status: DISCONTINUED | OUTPATIENT
Start: 2017-07-01 | End: 2017-07-06

## 2017-07-01 RX ORDER — DULOXETINE HYDROCHLORIDE 30 MG/1
60 CAPSULE, DELAYED RELEASE ORAL DAILY
Qty: 0 | Refills: 0 | Status: DISCONTINUED | OUTPATIENT
Start: 2017-07-01 | End: 2017-07-06

## 2017-07-01 RX ORDER — METOPROLOL TARTRATE 50 MG
50 TABLET ORAL DAILY
Qty: 0 | Refills: 0 | Status: DISCONTINUED | OUTPATIENT
Start: 2017-07-01 | End: 2017-07-06

## 2017-07-01 RX ORDER — INSULIN LISPRO 100/ML
VIAL (ML) SUBCUTANEOUS AT BEDTIME
Qty: 0 | Refills: 0 | Status: DISCONTINUED | OUTPATIENT
Start: 2017-07-01 | End: 2017-07-01

## 2017-07-01 RX ORDER — SEVELAMER CARBONATE 2400 MG/1
2400 POWDER, FOR SUSPENSION ORAL
Qty: 0 | Refills: 0 | Status: DISCONTINUED | OUTPATIENT
Start: 2017-07-01 | End: 2017-07-06

## 2017-07-01 RX ADMIN — Medication 81 MILLIGRAM(S): at 11:35

## 2017-07-01 RX ADMIN — Medication 50 MILLIGRAM(S): at 05:35

## 2017-07-01 RX ADMIN — Medication 100 MILLIGRAM(S): at 05:35

## 2017-07-01 RX ADMIN — OXYCODONE HYDROCHLORIDE 5 MILLIGRAM(S): 5 TABLET ORAL at 06:20

## 2017-07-01 RX ADMIN — SEVELAMER CARBONATE 2400 MILLIGRAM(S): 2400 POWDER, FOR SUSPENSION ORAL at 09:09

## 2017-07-01 RX ADMIN — DULOXETINE HYDROCHLORIDE 60 MILLIGRAM(S): 30 CAPSULE, DELAYED RELEASE ORAL at 11:34

## 2017-07-01 RX ADMIN — Medication 3 UNIT(S): at 17:56

## 2017-07-01 RX ADMIN — OXYCODONE HYDROCHLORIDE 5 MILLIGRAM(S): 5 TABLET ORAL at 05:51

## 2017-07-01 RX ADMIN — Medication 1 TABLET(S): at 11:35

## 2017-07-01 RX ADMIN — CINACALCET 60 MILLIGRAM(S): 30 TABLET, FILM COATED ORAL at 11:35

## 2017-07-01 RX ADMIN — CLOPIDOGREL BISULFATE 75 MILLIGRAM(S): 75 TABLET, FILM COATED ORAL at 21:13

## 2017-07-01 RX ADMIN — Medication 5: at 12:38

## 2017-07-01 RX ADMIN — LISINOPRIL 40 MILLIGRAM(S): 2.5 TABLET ORAL at 17:53

## 2017-07-01 RX ADMIN — ATORVASTATIN CALCIUM 20 MILLIGRAM(S): 80 TABLET, FILM COATED ORAL at 21:13

## 2017-07-01 RX ADMIN — Medication 1 EACH: at 22:07

## 2017-07-01 RX ADMIN — Medication 100 MILLIGRAM(S): at 21:13

## 2017-07-01 RX ADMIN — SEVELAMER CARBONATE 2400 MILLIGRAM(S): 2400 POWDER, FOR SUSPENSION ORAL at 17:54

## 2017-07-01 RX ADMIN — Medication 40 MILLIGRAM(S): at 05:35

## 2017-07-01 RX ADMIN — Medication 100 MILLIGRAM(S): at 17:53

## 2017-07-01 RX ADMIN — SEVELAMER CARBONATE 2400 MILLIGRAM(S): 2400 POWDER, FOR SUSPENSION ORAL at 13:04

## 2017-07-01 NOTE — H&P ADULT - PROBLEM SELECTOR PLAN 4
Pt with LLE edema.  Based on recent duplex exam may be related to stenosis of vein grafts and graft thrombus.  Check LLE duplex  Vascular consult in AM

## 2017-07-01 NOTE — CONSULT NOTE ADULT - PROBLEM SELECTOR RECOMMENDATION 9
Due for HD today  Consent obtained and placed in chart  HD unit notified  Orders placed in [employer]  Dose meds for eGFR <15  Continue cinacalcet and phos binders for bone/mineral metabolism  On lasix every other day for volume maintenance  Anemia of CKD-- will defer YOLANDA therapy for now in setting of concern for graft thrombosis.  Exacerbation of anemia noted with mild volume overload.  Monitor CBC and transfuse with HD as needed
- inpatient: s/p Lantus 10u given last evening at 11:45pm  - hyperglycemia today at lunch as patient ate breakfast and did not receive any premeal insulin  - start humalog 3u TID with meals   - patient has insulin supplies with her in hospital  - restart insulin pump at 9:45pm this evening. will continue home settings  - discharge with insulin pump

## 2017-07-01 NOTE — ED ADULT NURSE REASSESSMENT NOTE - NS ED NURSE REASSESS COMMENT FT1
Insulin pump removed at 23:30 as per Felicita JAMIL. Patient sticker placed on insulin pump in order to designate property.

## 2017-07-01 NOTE — H&P ADULT - PROBLEM SELECTOR PLAN 3
Severe hyperglycemia  Endocrine consulted  Started on lantus and humalog  F/U formal endocrine consult later in AM  ISS qAC qhs Severe hyperglycemia  Endocrine consulted  Started on lantus and received humalog in ED->FS now WNL, hold off on further humalog for now.  F/U formal endocrine consult later in AM  ISS qAC qhs

## 2017-07-01 NOTE — H&P ADULT - PROBLEM SELECTOR PLAN 6
Pt with macrocytosis and chronic anemia.  Also with unusually low A1C given blood sugars during admissions.  Check B12, Folate, MMA, Haptoglobin, LDH.

## 2017-07-01 NOTE — H&P ADULT - HISTORY OF PRESENT ILLNESS
Pt is a 62yo F w/pmhx of DM2, CAD, PAD, HTN, HLE who now presents with LLE edema/swelling.  Patient reports that this has been chronic since LLE bypass and vein grafting but over the last week she has had increasing swelling.  Patient reports that this was uncomfortable due to the swelling, but denies that it has been "pain".  Patient is also very tired now s/p benadryl and oxycodone given in ED and does not want to expound further.  No fevers or chills, no diarrhea, no chest pain, no constipation.  Is due for dialysis today.  Denies sob/cough.

## 2017-07-01 NOTE — PATIENT PROFILE ADULT. - VISION (WITH CORRECTIVE LENSES IF THE PATIENT USUALLY WEARS THEM):
bifocals/Severely impaired: cannot locate objects without hearing or touching them or patient nonresponsive.

## 2017-07-01 NOTE — CONSULT NOTE ADULT - ASSESSMENT
62 y/o female with uncontrolled T1DM, with neuropathy, ESRD on HD, CAD, PAD s/p bypass, CVA  presents with LLE pain.

## 2017-07-01 NOTE — H&P ADULT - NSHPPHYSICALEXAM_GEN_ALL_CORE
Vital Signs Last 24 Hrs  T(C): 36.8 (01 Jul 2017 04:24), Max: 37.1 (30 Jun 2017 20:52)  T(F): 98.2 (01 Jul 2017 04:24), Max: 98.7 (30 Jun 2017 20:52)  HR: 81 (01 Jul 2017 04:24) (75 - 81)  BP: 149/67 (01 Jul 2017 04:24) (149/67 - 190/76)  BP(mean): --  RR: 20 (01 Jul 2017 04:24) (20 - 22)  SpO2: 98% (01 Jul 2017 04:24) (95% - 99%)    GENERAL: No acute distress, well-developed  HEAD:  Atraumatic, Normocephalic  EYES: EOMI, conjunctiva and sclera clear  ENT: Oral mucosa moist  NECK: Neck supple  CHEST/LUNG: Clear to auscultation bilaterally; No wheeze, no rales, no rhonchi.    HEART: Regular rate and rhythm; No murmurs, rubs, or gallops  ABDOMEN: Soft, Nontender, Nondistended; Bowel sounds present  EXTREMITIES:  No clubbing, cyanosis, or edema  VASCULAR: Posterior tibilias pulses intact bilaterally  PSYCH: Normal behavior, normal affect  NEUROLOGY: lethargic but s/p benadryl and oxycodone so likely 2/2 medications  SKIN: grossly warm and dry

## 2017-07-01 NOTE — PROGRESS NOTE ADULT - SUBJECTIVE AND OBJECTIVE BOX
[facility]   [phone]  Dr. Urban (covering for Dr. Burger)    DIALYSIS NOTE  Patient seen and examined during HD.  Tolerating treatment    ALLERGIES & MEDICATIONS  --------------------------------------------------------------------------------  Allergies    No Known Allergies    Intolerances      Standing Inpatient Medications  insulin glargine Injectable (LANTUS) 10 Unit(s) SubCutaneous at bedtime  aspirin enteric coated 81 milliGRAM(s) Oral daily  lisinopril 40 milliGRAM(s) Oral daily  DULoxetine 60 milliGRAM(s) Oral daily  atorvastatin 20 milliGRAM(s) Oral at bedtime  clopidogrel Tablet 75 milliGRAM(s) Oral at bedtime  metoprolol succinate ER 50 milliGRAM(s) Oral daily  furosemide    Tablet 40 milliGRAM(s) Oral <User Schedule>  cinacalcet 60 milliGRAM(s) Oral daily  sevelamer hydrochloride 2400 milliGRAM(s) Oral three times a day with meals  hydrALAZINE 100 milliGRAM(s) Oral every 8 hours  multivitamin 1 Tablet(s) Oral daily  heparin  Injectable 5000 Unit(s) SubCutaneous every 12 hours  insulin lispro (HumaLOG) corrective regimen sliding scale   SubCutaneous three times a day before meals  insulin lispro (HumaLOG) corrective regimen sliding scale   SubCutaneous at bedtime    PRN Inpatient Medications      REVIEW OF SYSTEMS  --------------------------------------------------------------------------------  For full ROS, see consult note from earlier today    VITALS/PHYSICAL EXAM  --------------------------------------------------------------------------------  T(C): 36.7 (07-01-17 @ 13:35), Max: 37.1 (06-30-17 @ 20:52)  HR: 76 (07-01-17 @ 13:35) (75 - 81)  BP: 155/67 (07-01-17 @ 13:35) (149/67 - 190/76)  RR: 18 (07-01-17 @ 13:35) (16 - 22)  SpO2: 100% (07-01-17 @ 13:35) (95% - 100%)  Wt(kg): --  Height (cm): 162.56 (07-01-17 @ 01:06)  Weight (kg): 63.3 (07-01-17 @ 01:06)  BMI (kg/m2): 24 (07-01-17 @ 01:06)  BSA (m2): 1.68 (07-01-17 @ 01:06)      06-30-17 @ 07:01  -  07-01-17 @ 07:00  --------------------------------------------------------  IN: 120 mL / OUT: 0 mL / NET: 120 mL    07-01-17 @ 07:01  -  07-01-17 @ 15:33  --------------------------------------------------------  IN: 240 mL / OUT: 0 mL / NET: 240 mL      Physical Exam:  	Gen: NAD, well-appearing  	Pulm: CTA B/L no w/r/r  	CV: RRR, S1S2  	Abd: +BS, soft, nontender/nondistended  	: No suprapubic tenderness, no irene  	Extremity: LLE edema and erythema, +TTP  	Neuro: A&Ox3  	Vascular access: LUE AVF + thrill with moderate pulsatility being utilized for HD    LABS/STUDIES  --------------------------------------------------------------------------------              7.8    4.7   >-----------<  195      [07-01-17 @ 07:21]              21.7     137  |  91  |  58  ----------------------------<  90      [07-01-17 @ 07:21]  4.6   |  25  |  7.38        Ca     8.2     [07-01-17 @ 07:21]      Mg     2.0     [07-01-17 @ 07:21]      Phos  5.4     [07-01-17 @ 07:21]    TPro  7.1  /  Alb  4.1  /  TBili  0.4  /  DBili  x   /  AST  61  /  ALT  39  /  AlkPhos  252  [06-30-17 @ 22:23]              [07-01-17 @ 07:21]

## 2017-07-01 NOTE — CONSULT NOTE ADULT - ASSESSMENT
61y Female with ESRD on HD, DM, PAD s/p LLE bypass, HTN, who presents with LLE edema and pain, hyperkalemia

## 2017-07-01 NOTE — H&P ADULT - PROBLEM SELECTOR PLAN 1
Pt with hyperkalemia.  May be partially related to hyperglycemia with insufficient insulin?  Improved s/p temporizing medications.  Will give lasix now given volume overload as well and this should help reduce K+ as well.  No t-wave changes related to K+ on ekg  Should have HD today-will need renal consult in AM

## 2017-07-01 NOTE — H&P ADULT - NSHPSOURCEINFORD_GEN_ALL_CORE
Anesthesia Post Evaluation    Patient: Randa Devine    Procedure(s) Performed: Procedure(s) (LRB):  COLONOSCOPY (N/A)    Final Anesthesia Type: general  Patient location during evaluation: PACU  Patient participation: Yes- Able to Participate  Level of consciousness: awake and alert and oriented  Post-procedure vital signs: reviewed and stable  Pain management: adequate  Airway patency: patent  PONV status at discharge: No PONV  Anesthetic complications: no      Cardiovascular status: blood pressure returned to baseline, hemodynamically stable and stable  Respiratory status: unassisted, room air and spontaneous ventilation  Hydration status: euvolemic  Follow-up not needed.        Visit Vitals    BP (!) 92/55    Pulse 86    Temp 36.2 °C (97.2 °F) (Oral)    Resp 18    Ht 5' (1.524 m)    Wt 61.2 kg (134 lb 14.7 oz)    LMP  (LMP Unknown)    SpO2 (!) 94%    Breastfeeding No    BMI 26.35 kg/m2       Pain/Billy Score: Pain Assessment Performed: Yes (4/3/2017  8:00 PM)  Presence of Pain: denies (4/3/2017  8:00 PM)  Pain Rating Prior to Med Admin: 7 (4/3/2017  9:20 AM)  Billy Score: 10 (4/3/2017  8:00 PM)      
Patient/Chart(s)

## 2017-07-01 NOTE — H&P ADULT - ASSESSMENT
60yo F w/pmhx of DM2, CAD, PAD, HTN, HLE who now presents with LLE edema/swelling, found to have severe hyperglycemia, hyperkalemia, pulmonary edema.

## 2017-07-01 NOTE — H&P ADULT - PROBLEM SELECTOR PLAN 5
Pt's previous A1C checked recently only 7.3 but this is likely not reliable given anemia. Pt's previous A1C checked recently only 7.3 but this is likely not reliable given anemia.  Continue lantus/humalog  ISS  Will need workup of macrocytosis/anemia as below

## 2017-07-01 NOTE — PROGRESS NOTE ADULT - ASSESSMENT
61y Female with ESRD on HD, DM, PAD s/p LLE bypass, HTN, who presents with LLE edema and pain, hyperkalemia      For full A/P see consult note from earlier today  Currently on HD with goal 2kg UF as tolerated  No YOLANDA tx until thrombosis of LE graft has been ruled out

## 2017-07-01 NOTE — H&P ADULT - NSHPLABSRESULTS_GEN_ALL_CORE
Labs personally reviewed:                        8.3    5.6   )-----------( 224      ( 30 Jun 2017 22:23 )             24.1       07-01    135  |  90<L>  |  56<H>  ----------------------------<  288<H>  4.5   |  22  |  7.02<H>    Ca    8.5      01 Jul 2017 01:43  Phos  4.7     06-30  Mg     2.1     06-30    TPro  7.1  /  Alb  4.1  /  TBili  0.4  /  DBili  x   /  AST  61<H>  /  ALT  39  /  AlkPhos  252<H>  06-30            LIVER FUNCTIONS - ( 30 Jun 2017 22:23 )  Alb: 4.1 g/dL / Pro: 7.1 g/dL / ALK PHOS: 252 U/L / ALT: 39 U/L RC / AST: 61 U/L / GGT: x             CXR personally reviewed-likely mild pulmonary edema    EKG personally reviewed-NSR @ 75bpm with mild Qtc prolongation @ 491ms

## 2017-07-02 DIAGNOSIS — E10.59 TYPE 1 DIABETES MELLITUS WITH OTHER CIRCULATORY COMPLICATIONS: ICD-10-CM

## 2017-07-02 DIAGNOSIS — R06.02 SHORTNESS OF BREATH: ICD-10-CM

## 2017-07-02 LAB
ANION GAP SERPL CALC-SCNC: 15 MMOL/L — SIGNIFICANT CHANGE UP (ref 5–17)
BUN SERPL-MCNC: 32 MG/DL — HIGH (ref 7–23)
CALCIUM SERPL-MCNC: 7.8 MG/DL — LOW (ref 8.4–10.5)
CHLORIDE SERPL-SCNC: 95 MMOL/L — LOW (ref 96–108)
CK MB BLD-MCNC: 1.9 % — SIGNIFICANT CHANGE UP (ref 0–3.5)
CK MB CFR SERPL CALC: 5.2 NG/ML — HIGH (ref 0–3.8)
CK SERPL-CCNC: 281 U/L — HIGH (ref 25–170)
CO2 SERPL-SCNC: 24 MMOL/L — SIGNIFICANT CHANGE UP (ref 22–31)
CREAT SERPL-MCNC: 4.55 MG/DL — HIGH (ref 0.5–1.3)
GAS PNL BLDA: SIGNIFICANT CHANGE UP
GLUCOSE SERPL-MCNC: 433 MG/DL — HIGH (ref 70–99)
HCT VFR BLD CALC: 23.9 % — LOW (ref 34.5–45)
HGB BLD-MCNC: 7.7 G/DL — LOW (ref 11.5–15.5)
MCHC RBC-ENTMCNC: 32.2 GM/DL — SIGNIFICANT CHANGE UP (ref 32–36)
MCHC RBC-ENTMCNC: 33.4 PG — SIGNIFICANT CHANGE UP (ref 27–34)
MCV RBC AUTO: 104 FL — HIGH (ref 80–100)
PLATELET # BLD AUTO: 222 K/UL — SIGNIFICANT CHANGE UP (ref 150–400)
POTASSIUM SERPL-MCNC: 5.3 MMOL/L — SIGNIFICANT CHANGE UP (ref 3.5–5.3)
POTASSIUM SERPL-SCNC: 5.3 MMOL/L — SIGNIFICANT CHANGE UP (ref 3.5–5.3)
RBC # BLD: 2.3 M/UL — LOW (ref 3.8–5.2)
RBC # FLD: 13 % — SIGNIFICANT CHANGE UP (ref 10.3–14.5)
SODIUM SERPL-SCNC: 134 MMOL/L — LOW (ref 135–145)
TROPONIN T SERPL-MCNC: 0.1 NG/ML — HIGH (ref 0–0.06)
WBC # BLD: 5 K/UL — SIGNIFICANT CHANGE UP (ref 3.8–10.5)
WBC # FLD AUTO: 5 K/UL — SIGNIFICANT CHANGE UP (ref 3.8–10.5)

## 2017-07-02 PROCEDURE — 99231 SBSQ HOSP IP/OBS SF/LOW 25: CPT | Mod: GC

## 2017-07-02 PROCEDURE — 71275 CT ANGIOGRAPHY CHEST: CPT | Mod: 26

## 2017-07-02 RX ORDER — FUROSEMIDE 40 MG
80 TABLET ORAL EVERY 12 HOURS
Qty: 0 | Refills: 0 | Status: DISCONTINUED | OUTPATIENT
Start: 2017-07-02 | End: 2017-07-06

## 2017-07-02 RX ORDER — OXYCODONE HYDROCHLORIDE 5 MG/1
5 TABLET ORAL ONCE
Qty: 0 | Refills: 0 | Status: DISCONTINUED | OUTPATIENT
Start: 2017-07-02 | End: 2017-07-02

## 2017-07-02 RX ORDER — FUROSEMIDE 40 MG
80 TABLET ORAL ONCE
Qty: 0 | Refills: 0 | Status: COMPLETED | OUTPATIENT
Start: 2017-07-02 | End: 2017-07-02

## 2017-07-02 RX ADMIN — OXYCODONE HYDROCHLORIDE 5 MILLIGRAM(S): 5 TABLET ORAL at 21:24

## 2017-07-02 RX ADMIN — Medication 81 MILLIGRAM(S): at 12:48

## 2017-07-02 RX ADMIN — Medication 50 MILLIGRAM(S): at 05:39

## 2017-07-02 RX ADMIN — Medication 80 MILLIGRAM(S): at 12:48

## 2017-07-02 RX ADMIN — Medication 100 MILLIGRAM(S): at 05:39

## 2017-07-02 RX ADMIN — SEVELAMER CARBONATE 2400 MILLIGRAM(S): 2400 POWDER, FOR SUSPENSION ORAL at 09:15

## 2017-07-02 RX ADMIN — Medication 1 TABLET(S): at 18:51

## 2017-07-02 RX ADMIN — OXYCODONE HYDROCHLORIDE 5 MILLIGRAM(S): 5 TABLET ORAL at 20:54

## 2017-07-02 RX ADMIN — LISINOPRIL 40 MILLIGRAM(S): 2.5 TABLET ORAL at 06:46

## 2017-07-02 RX ADMIN — HEPARIN SODIUM 5000 UNIT(S): 5000 INJECTION INTRAVENOUS; SUBCUTANEOUS at 05:39

## 2017-07-02 NOTE — CONSULT NOTE ADULT - ASSESSMENT
61y year old Female who presents with LLE edema, patient had bilateral fempop bypass, left fempop revision in May 2016 with multiple angioplasty revision. most recent in 6/9/2017 with a left lower extremity balloon angioplasty reportedly.  - continue current medical management, no acute vascular surgery intervention indicated.  - DVT PPx  - Please obtain arterial duplex of lower extremities bilaterally to assess the graft, preferable including iliac artery  - please obtain bilateral venous duplex to r/o any venous thrombosis  - Vascular surgery team will follow.

## 2017-07-02 NOTE — PROGRESS NOTE ADULT - SUBJECTIVE AND OBJECTIVE BOX
Chief Complaint: F/u T1DM    History: 60 y/o female with poorly controlled brittle T1DM on insulin pump since yesterday. T1DM complicated with ESRD on HD T/Th/Sat, PAD s/p stent in LLE, here with persistent Left leg edema and pain. States that she feels well to use her insulin pump; bolused insulin this morning when FS was 266-300 and then vomited and FS was 180. Continues to have leg pain and nausea.    MEDICATIONS  (STANDING):  aspirin enteric coated 81 milliGRAM(s) Oral daily  lisinopril 40 milliGRAM(s) Oral daily  DULoxetine 60 milliGRAM(s) Oral daily  atorvastatin 20 milliGRAM(s) Oral at bedtime  clopidogrel Tablet 75 milliGRAM(s) Oral at bedtime  metoprolol succinate ER 50 milliGRAM(s) Oral daily  furosemide    Tablet 40 milliGRAM(s) Oral <User Schedule>  cinacalcet 60 milliGRAM(s) Oral daily  sevelamer hydrochloride 2400 milliGRAM(s) Oral three times a day with meals  hydrALAZINE 100 milliGRAM(s) Oral every 8 hours  multivitamin 1 Tablet(s) Oral daily  heparin  Injectable 5000 Unit(s) SubCutaneous every 12 hours  insulin lispro (HumaLOG) Pump 1 Each SubCutaneous Continuous Pump  dextrose 5%. 1000 milliLiter(s) (50 mL/Hr) IV Continuous <Continuous>  dextrose 50% Injectable 12.5 Gram(s) IV Push once  dextrose 50% Injectable 25 Gram(s) IV Push once  dextrose 50% Injectable 25 Gram(s) IV Push once  furosemide   Injectable 80 milliGRAM(s) IV Push every 12 hours    MEDICATIONS  (PRN):  dextrose Gel 1 Dose(s) Oral once PRN Blood Glucose LESS THAN 70 milliGRAM(s)/deciLiter  glucagon  Injectable 1 milliGRAM(s) IntraMuscular once PRN Glucose <70 milliGRAM(s)/deciLiter      Allergies    No Known Allergies    Intolerances      Review of Systems:  Constitutional: No fever  Eyes: No blurry vision  Neuro: No tremors  HEENT: No pain  Cardiovascular: No chest pain, palpitations  Respiratory: No SOB, no cough  GI: No nausea, vomiting, abdominal pain  : No dysuria  Skin: no rash  Psych: no depression  Endocrine: no polyuria, polydipsia  Hem/lymph: + swelling  Osteoporosis: no fractures    ALL OTHER SYSTEMS REVIEWED AND NEGATIVE    PHYSICAL EXAM:  VITALS: T(C): 36.2 (07-02-17 @ 14:02)  T(F): 97.2 (07-02-17 @ 14:02), Max: 98.6 (07-01-17 @ 21:12)  HR: 78 (07-02-17 @ 14:02) (62 - 82)  BP: 145/81 (07-02-17 @ 14:02) (145/81 - 170/78)  RR:  (16 - 18)  SpO2:  (97% - 100%)  GENERAL: Well-groomed, well-developed, thin female with mild distress due to pain  EYES: No proptosis, no lid lag, anicteric  HEENT:  Atraumatic, Normocephalic, moist mucous membranes  THYROID: Normal size, no palpable nodules  RESPIRATORY: Clear to auscultation bilaterally; No rales, rhonchi, wheezing, or rubs  CARDIOVASCULAR: Regular rate and rhythm; No murmurs; no peripheral edema  GI: Soft, nontender, non distended, normal bowel sounds  SKIN: Dry, intact, No rashes or lesions  MUSCULOSKELETAL: Limited ROM, noted to have Left leg edema and warmth with minimal erythema  NEURO: sensation intact, extraocular movements intact, no tremor, normal reflexes  PSYCH: Alert and oriented x 3, normal affect, normal mood  CUSHING'S SIGNS: no striae    CAPILLARY BLOOD GLUCOSE  180 (07-02 @ 13:01)  306 (07-02 @ 08:47)  266 (07-01 @ 21:42)  98 (07-01 @ 17:51)  364 (07-01 @ 12:32)  127 (07-01 @ 08:45)  107 (07-01 @ 06:04)  193 (07-01 @ 02:24)      07-02    134<L>  |  95<L>  |  32<H>  ----------------------------<  433<H>  5.3   |  24  |  4.55<H>    EGFR if : 11<L>  EGFR if non : 10<L>    Ca    7.8<L>      07-02  Mg     2.0     07-01  Phos  5.4     07-01    TPro  7.1  /  Alb  4.1  /  TBili  0.4  /  DBili  x   /  AST  61<H>  /  ALT  39  /  AlkPhos  252<H>  06-30      Hemoglobin A1C, Whole Blood: 7.3 % <H> [4.0 - 5.6] (06-16-17 @ 04:45)  Hemoglobin A1C, Whole Blood: 8.5 % <H> [4.0 - 5.6] (04-11-17 @ 07:12)

## 2017-07-02 NOTE — PROGRESS NOTE ADULT - PROBLEM SELECTOR PLAN 5
Pt's previous A1C checked recently only 7.3 but this is likely not reliable given anemia.  Continue lantus/humalog  ISS  Will need workup of macrocytosis/anemia as below

## 2017-07-02 NOTE — PROGRESS NOTE ADULT - SUBJECTIVE AND OBJECTIVE BOX
For Tonio:   Pt w ch L LE discomfort.  Entire limb  H/O PAD, b/l bypasses.  L LE: warm/pink.  No edema, ischemia, hematoma or gross infection.  Dr. Elizalde will fu

## 2017-07-02 NOTE — PROGRESS NOTE ADULT - SUBJECTIVE AND OBJECTIVE BOX
[facility]   [phone]  Dr. Urban (covering for Dr. Burger)  RENAL FOLLOW UP NOTE  --------------------------------------------------------------------------------  Patient seen and examined.  Received call from Dr. Viera that patient acutely SOB ~90 min ago.  80mg IV lasix ordered without improvement in symptoms (patient still urinates).    Patient had successful HD yday with 2kg removed.    Patient lying in recumbent position, c/o inability to catch breath.    PAST HISTORY  --------------------------------------------------------------------------------  No significant changes to PMH, PSH, FHx, SHx, unless otherwise noted    ALLERGIES & MEDICATIONS  --------------------------------------------------------------------------------  Allergies    No Known Allergies    Intolerances      Standing Inpatient Medications  aspirin enteric coated 81 milliGRAM(s) Oral daily  lisinopril 40 milliGRAM(s) Oral daily  DULoxetine 60 milliGRAM(s) Oral daily  atorvastatin 20 milliGRAM(s) Oral at bedtime  clopidogrel Tablet 75 milliGRAM(s) Oral at bedtime  metoprolol succinate ER 50 milliGRAM(s) Oral daily  furosemide    Tablet 40 milliGRAM(s) Oral <User Schedule>  cinacalcet 60 milliGRAM(s) Oral daily  sevelamer hydrochloride 2400 milliGRAM(s) Oral three times a day with meals  hydrALAZINE 100 milliGRAM(s) Oral every 8 hours  multivitamin 1 Tablet(s) Oral daily  heparin  Injectable 5000 Unit(s) SubCutaneous every 12 hours  insulin lispro (HumaLOG) Pump 1 Each SubCutaneous Continuous Pump  dextrose 5%. 1000 milliLiter(s) IV Continuous <Continuous>  dextrose 50% Injectable 12.5 Gram(s) IV Push once  dextrose 50% Injectable 25 Gram(s) IV Push once  dextrose 50% Injectable 25 Gram(s) IV Push once  furosemide   Injectable 80 milliGRAM(s) IV Push once    PRN Inpatient Medications  dextrose Gel 1 Dose(s) Oral once PRN  glucagon  Injectable 1 milliGRAM(s) IntraMuscular once PRN      REVIEW OF SYSTEMS  --------------------------------------------------------------------------------  Gen: + fatigue  CVS: denies chest pain/palpitations  Resp: + SOB  GI: Denies N/V/Abd pain  : Still makes urine    All other systems were reviewed and are negative, except as noted.    VITALS/PHYSICAL EXAM  --------------------------------------------------------------------------------  T(C): 36.9 (07-02-17 @ 04:29), Max: 37 (07-01-17 @ 21:12)  HR: 79 (07-02-17 @ 04:29) (62 - 82)  BP: 154/71 (07-02-17 @ 04:29) (154/71 - 170/78)  RR: 18 (07-02-17 @ 04:29) (16 - 18)  SpO2: 99% (07-02-17 @ 04:29) (99% - 100%)  Wt(kg): --  Height (cm): 162.56 (07-01-17 @ 01:06)  Weight (kg): 63.3 (07-01-17 @ 01:06)  BMI (kg/m2): 24 (07-01-17 @ 01:06)  BSA (m2): 1.68 (07-01-17 @ 01:06)      07-01-17 @ 07:01  -  07-02-17 @ 07:00  --------------------------------------------------------  IN: 1440 mL / OUT: 2600 mL / NET: -1160 mL      Physical Exam:  	Gen: Uncomfortable appearing  	Pulm: Decreased breath sounds b/l bases  	CV: RRR, S1S2  	Abd: +BS, soft, nontender/nondistended  	: No suprapubic tenderness.  no irene              Extremity: No cyanosis, no edema  	Neuro: A&O x 3  	Access: LUE AVF + thrill      LABS/STUDIES  --------------------------------------------------------------------------------              7.7    5.0   >-----------<  222      [07-02-17 @ 06:53]              23.9     134  |  95  |  32  ----------------------------<  433      [07-02-17 @ 06:53]  5.3   |  24  |  4.55        Ca     7.8     [07-02-17 @ 06:53]      Mg     2.0     [07-01-17 @ 07:21]      Phos  5.4     [07-01-17 @ 07:21]    TPro  7.1  /  Alb  4.1  /  TBili  0.4  /  DBili  x   /  AST  61  /  ALT  39  /  AlkPhos  252  [06-30-17 @ 22:23]              [07-01-17 @ 07:21]    eGFR if Non African American: 10 mL/min/1.73M2 (07-02 @ 06:53)  eGFR if : 11 mL/min/1.73M2 (07-02 @ 06:53)    Creatinine Trend:  SCr 4.55 [07-02 @ 06:53]  SCr 7.38 [07-01 @ 07:21]  SCr 7.02 [07-01 @ 01:43]  SCr 6.81 [06-30 @ 22:23]  SCr 4.25 [06-18 @ 02:36]              Urinalysis - [06-04-17 @ 08:24]      Color Yellow / Appearance Clear / SG 1.013 / pH 8.5      Gluc 1000 / Ketone Negative  / Bili Negative / Urobili Negative       Blood Negative / Protein >600 / Leuk Est Small / Nitrite Negative      RBC 3-5 / WBC 6-10 / Hyaline  / Gran  / Sq Epi  / Non Sq Epi OCC / Bacteria       HbA1c 7.3      [06-16-17 @ 04:45] [facility]   [phone]  Dr. Urban (covering for Dr. Burger)  RENAL FOLLOW UP NOTE  --------------------------------------------------------------------------------  Patient seen and examined.  Received call from Dr. Viera that patient acutely SOB ~90 min ago.  80mg IV lasix ordered without improvement in symptoms (patient still urinates).    Patient had successful HD yday with 2kg removed.    Patient lying in recumbent position, c/o inability to catch breath.    PAST HISTORY  --------------------------------------------------------------------------------  No significant changes to PMH, PSH, FHx, SHx, unless otherwise noted    ALLERGIES & MEDICATIONS  --------------------------------------------------------------------------------  Allergies    No Known Allergies    Intolerances      Standing Inpatient Medications  aspirin enteric coated 81 milliGRAM(s) Oral daily  lisinopril 40 milliGRAM(s) Oral daily  DULoxetine 60 milliGRAM(s) Oral daily  atorvastatin 20 milliGRAM(s) Oral at bedtime  clopidogrel Tablet 75 milliGRAM(s) Oral at bedtime  metoprolol succinate ER 50 milliGRAM(s) Oral daily  furosemide    Tablet 40 milliGRAM(s) Oral <User Schedule>  cinacalcet 60 milliGRAM(s) Oral daily  sevelamer hydrochloride 2400 milliGRAM(s) Oral three times a day with meals  hydrALAZINE 100 milliGRAM(s) Oral every 8 hours  multivitamin 1 Tablet(s) Oral daily  heparin  Injectable 5000 Unit(s) SubCutaneous every 12 hours  insulin lispro (HumaLOG) Pump 1 Each SubCutaneous Continuous Pump  dextrose 5%. 1000 milliLiter(s) IV Continuous <Continuous>  dextrose 50% Injectable 12.5 Gram(s) IV Push once  dextrose 50% Injectable 25 Gram(s) IV Push once  dextrose 50% Injectable 25 Gram(s) IV Push once  furosemide   Injectable 80 milliGRAM(s) IV Push once    PRN Inpatient Medications  dextrose Gel 1 Dose(s) Oral once PRN  glucagon  Injectable 1 milliGRAM(s) IntraMuscular once PRN      REVIEW OF SYSTEMS  --------------------------------------------------------------------------------  Gen: + fatigue  CVS: denies chest pain/palpitations  Resp: + SOB  GI: Denies N/V/Abd pain  : Still makes urine    All other systems were reviewed and are negative, except as noted.    VITALS/PHYSICAL EXAM  --------------------------------------------------------------------------------  T(C): 36.9 (07-02-17 @ 04:29), Max: 37 (07-01-17 @ 21:12)  HR: 79 (07-02-17 @ 04:29) (62 - 82)  BP: 154/71 (07-02-17 @ 04:29) (154/71 - 170/78)  RR: 18 (07-02-17 @ 04:29) (16 - 18)  SpO2: 99% (07-02-17 @ 04:29) (99% - 100%)  Wt(kg): --  Height (cm): 162.56 (07-01-17 @ 01:06)  Weight (kg): 63.3 (07-01-17 @ 01:06)  BMI (kg/m2): 24 (07-01-17 @ 01:06)  BSA (m2): 1.68 (07-01-17 @ 01:06)      07-01-17 @ 07:01  -  07-02-17 @ 07:00  --------------------------------------------------------  IN: 1440 mL / OUT: 2600 mL / NET: -1160 mL      Physical Exam:  	Gen: Uncomfortable appearing  	Pulm: Decreased breath sounds b/l bases  	CV: RRR, S1S2  	Abd: +BS, soft, nontender/nondistended  	: No suprapubic tenderness.  no irene              Extremity: No cyanosis, LLE edema  	Neuro: A&O x 3  	Access: LUE AVF + thrill      LABS/STUDIES  --------------------------------------------------------------------------------              7.7    5.0   >-----------<  222      [07-02-17 @ 06:53]              23.9     134  |  95  |  32  ----------------------------<  433      [07-02-17 @ 06:53]  5.3   |  24  |  4.55        Ca     7.8     [07-02-17 @ 06:53]      Mg     2.0     [07-01-17 @ 07:21]      Phos  5.4     [07-01-17 @ 07:21]    TPro  7.1  /  Alb  4.1  /  TBili  0.4  /  DBili  x   /  AST  61  /  ALT  39  /  AlkPhos  252  [06-30-17 @ 22:23]              [07-01-17 @ 07:21]    eGFR if Non African American: 10 mL/min/1.73M2 (07-02 @ 06:53)  eGFR if : 11 mL/min/1.73M2 (07-02 @ 06:53)    Creatinine Trend:  SCr 4.55 [07-02 @ 06:53]  SCr 7.38 [07-01 @ 07:21]  SCr 7.02 [07-01 @ 01:43]  SCr 6.81 [06-30 @ 22:23]  SCr 4.25 [06-18 @ 02:36]              Urinalysis - [06-04-17 @ 08:24]      Color Yellow / Appearance Clear / SG 1.013 / pH 8.5      Gluc 1000 / Ketone Negative  / Bili Negative / Urobili Negative       Blood Negative / Protein >600 / Leuk Est Small / Nitrite Negative      RBC 3-5 / WBC 6-10 / Hyaline  / Gran  / Sq Epi  / Non Sq Epi OCC / Bacteria       HbA1c 7.3      [06-16-17 @ 04:45]

## 2017-07-02 NOTE — CONSULT NOTE ADULT - ASSESSMENT
Acute/Chronic Diastolic CHF    -80mg IV lasix stat, then start BID  -renal called to evaluate for possible additional HD  -stat CXR Acute/Chronic Diastolic CHF    -80mg IV lasix stat, then start BID  -renal called to evaluate for possible additional HD  -CT Chest

## 2017-07-02 NOTE — CONSULT NOTE ADULT - SUBJECTIVE AND OBJECTIVE BOX
CC: Patient is a 61y old  Female who presents with a chief complaint of Left leg swelling (01 Jul 2017 03:54)        Patient is a 61y year old female with PMHx of    HPI:  Pt is a 60yo F w/pmhx of DM2, CAD, PAD, HTN, HLE who now presents with LLE edema/swelling.  Patient reports that this has been chronic since LLE bypass and vein grafting but over the last week she has had increasing swelling.  Patient reports that this was uncomfortable due to the swelling, but denies that it has been "pain".  Patient is also very tired now s/p benadryl and oxycodone given in ED and does not want to expound further.  No fevers or chills, no diarrhea, no chest pain, no constipation.  Denies sob/cough. (01 Jul 2017 03:54)      PMH  Subclavian vein stenosis, left  Cellulitis  DKA, type 1  ACS (acute coronary syndrome)  MI (myocardial infarction)  MI, old  TIA (transient ischemic attack)  PAD (peripheral artery disease)  HLD (hyperlipidemia)  HTN (hypertension)  ESRD (end stage renal disease) on dialysis  Type 1 diabetes mellitus  CVA (cerebral vascular accident)  CAD (coronary artery disease)  Myocardial infarct  Murmur, cardiac  Gout  CVA (cerebral infarction)  Peripheral vascular disease  Coronary artery disease  Diabetes mellitus type 1  ESRD (end stage renal disease)  TIA (transient ischemic attack)  x 3 2005 2 nt to VSD/ASD repair  Diabetes mellitus type II  HTN (hypertension), benign  Hyperlipidemia    PSH  Status post device closure of ASD  History of cardiac catheterization  S/P cholecystectomy  AV fistula  S/P femoral-popliteal bypass surgery  S/P femoral-popliteal bypass surgery  Stented coronary artery  AV (arteriovenous fistula)  S/P cholecystectomy  ASD OR VSD  Repair 2005    MEDS    Allergies    No Known Allergies    Social: Former smoker    Physical Exam  T(C): 36.2 (07-02-17 @ 14:02), Max: 37 (07-01-17 @ 21:12)  HR: 78 (07-02-17 @ 14:02) (78 - 82)  BP: 145/81 (07-02-17 @ 14:02) (145/81 - 161/65)  RR: 18 (07-02-17 @ 14:02) (17 - 18)  SpO2: 97% (07-02-17 @ 14:02) (97% - 99%)  Tmax: T(C): , Max: 37 (07-01-17 @ 21:12)    Gen: appear anxious, alert and interactive, orientation times three  HEENT: normocephalic, atraumatic, no scleral icterus  CV: S1, S2, RRR  Pulm: CTA B/L  Abd: Soft, ND, NTP, no rebound, no guarding, no palpable organomegaly/masses  Ext: warm, LLE edema, clear doppler arterial sound DP/PT bilaterally.    07-01-17 - 07-02-17  --------------------------------------------------------  IN:    Oral Fluid: 840 mL    Other: 600 mL  Total IN: 1440 mL    OUT:    Other: 2600 mL  Total OUT: 2600 mL    Total NET: -1160 mL      07-02-17  -  07-02-17  --------------------------------------------------------  IN:    Oral Fluid: 240 mL  Total IN: 240 mL    OUT:    Voided: 200 mL  Total OUT: 200 mL    Total NET: 40 mL      Labs:                        7.7    5.0   )-----------( 222      ( 02 Jul 2017 06:53 )             23.9     07-02    134<L>  |  95<L>  |  32  ----------------------------<  433  5.3   |  24  |  4.55    Ca    7.8      02 Jul 2017 06:53  Phos  5.4     07-01  Mg     2.0     07-01    TPro  7.1  /  Alb  4.1  /  TBili  0.4  /  DBili  x   /  AST  61  /  ALT  39  /  AlkPhos  252  06-30        ABG - ( 02 Jul 2017 15:27 )  pH: 7.42  /  pCO2: 41    /  pO2: 120   / HCO3: 26    / Base Excess: 2.3   /  SaO2: 99        Imaging  Arterial duplex 6/7  L common fem bypass vein graft stenosis (proximal and mid-thigh -> 2 areas of stenosis / high velocity)   Venous duplex 6/3   Negative for DVT
Coverage for Koss    CHIEF COMPLAINT:    HPI:  Pt is a 62yo F w/pmhx of DM2, CAD, PAD,CHF HTN, HLE who now presents with LLE edema/swelling.  Patient reports that this has been chronic since LLE bypass and vein grafting but over the last week she has had increasing swelling.  Patient reports that this was uncomfortable due to the swelling, but denies that it has been "pain".  Patient is also very tired now s/p benadryl and oxycodone given in ED and does not want to expound further.  Pt complaining of recent increase in dyspnea, sitting up visibly tachypneic, denies any chest pain or palpitations      PAST MEDICAL & SURGICAL HISTORY:  Subclavian vein stenosis, left: s/p stent  Cellulitis: 2015 left foot  DKA, type 1: 1/2015  ACS (acute coronary syndrome): 1/2015 - cath revealed 100% ostial stenosis not amenable to PCI - medical management  MI, old  TIA (transient ischemic attack): x 2 - 8-9 years ago prior to ASD/VSD repair  HTN (hypertension)  CAD (coronary artery disease): s/p stents  Murmur, cardiac  Gout: past  CVA (cerebral infarction): with no residual, 8 yrs ago, prior to heart surgery - ST memory loss  Peripheral vascular disease: occluded left fem-pop bypass 5/2015  Diabetes mellitus type 1: Insulin Dependent - Medtronic  Minimed Paradigm Insulin Pump - Novolog  ESRD (end stage renal disease): dialysis , tue, thursday, saturday  Hyperlipidemia  Status post device closure of ASD: &quot;clamshell&quot;  History of cardiac catheterization: 1/2015 - no intervention  S/P femoral-popliteal bypass surgery: L and R in 2013 with graft; 5/2015 CFA angioplasty left and ileofemoral endarterectomywith vein patch angioplasty of left fem-pop bypass graft  Multiple vascular surgery both leg, left fempop bypass revision 11/2015  AV (arteriovenous fistula): Left AV graft; revision with stent placement 2-3 years ago  S/P cholecystectomy          PREVIOUS DIAGNOSTIC TESTING:    [ ] Echocardiogram:  [ ]  Catheterization:  [ ] Stress Test:  	    MEDICATIONS:  MEDICATIONS  (STANDING):  aspirin enteric coated 81 milliGRAM(s) Oral daily  lisinopril 40 milliGRAM(s) Oral daily  DULoxetine 60 milliGRAM(s) Oral daily  atorvastatin 20 milliGRAM(s) Oral at bedtime  clopidogrel Tablet 75 milliGRAM(s) Oral at bedtime  metoprolol succinate ER 50 milliGRAM(s) Oral daily  furosemide    Tablet 40 milliGRAM(s) Oral <User Schedule>  cinacalcet 60 milliGRAM(s) Oral daily  sevelamer hydrochloride 2400 milliGRAM(s) Oral three times a day with meals  hydrALAZINE 100 milliGRAM(s) Oral every 8 hours  multivitamin 1 Tablet(s) Oral daily  heparin  Injectable 5000 Unit(s) SubCutaneous every 12 hours  insulin lispro (HumaLOG) Pump 1 Each SubCutaneous Continuous Pump  dextrose 5%. 1000 milliLiter(s) (50 mL/Hr) IV Continuous <Continuous>  dextrose 50% Injectable 12.5 Gram(s) IV Push once  dextrose 50% Injectable 25 Gram(s) IV Push once  dextrose 50% Injectable 25 Gram(s) IV Push once  furosemide   Injectable 80 milliGRAM(s) IV Push once      FAMILY HISTORY:  Family history of smoking  Family history of hypertension  Family history of cancer (Sibling)      SOCIAL HISTORY:    [ ] Non-smoker  [ ] Smoker  [ ] Alcohol    Allergies    No Known Allergies    Intolerances    	    REVIEW OF SYSTEMS:  CONSTITUTIONAL: No fever, weight loss, or fatigue  EYES: No eye pain, visual disturbances, or discharge  ENMT:  No difficulty hearing, tinnitus, vertigo; No sinus or throat pain  NECK: No pain or stiffness  RESPIRATORY: No cough, wheezing, chills or hemoptysis; No Shortness of Breath  CARDIOVASCULAR: No chest pain, palpitations, passing out, dizziness, or leg swelling  GASTROINTESTINAL: No abdominal or epigastric pain. No nausea, vomiting, or hematemesis; No diarrhea or constipation. No melena or hematochezia.  GENITOURINARY: No dysuria, frequency, hematuria, or incontinence  NEUROLOGICAL: No headaches, memory loss, loss of strength, numbness, or tremors  SKIN: No itching, burning, rashes, or lesions   	    [ ] All others negative	  [ ] Unable to obtain    PHYSICAL EXAM:  T(C): 36.9 (07-02-17 @ 04:29), Max: 37 (07-01-17 @ 21:12)  HR: 79 (07-02-17 @ 04:29) (62 - 82)  BP: 154/71 (07-02-17 @ 04:29) (154/71 - 170/78)  RR: 18 (07-02-17 @ 04:29) (16 - 18)  SpO2: 99% (07-02-17 @ 04:29) (99% - 100%)  Wt(kg): --  I&O's Summary    01 Jul 2017 07:01  -  02 Jul 2017 07:00  --------------------------------------------------------  IN: 1440 mL / OUT: 2600 mL / NET: -1160 mL        Appearance: Normal	  Psychiatry: A & O x 3, Mood & affect appropriate  HEENT:   Normal oral mucosa, PERRL, EOMI	  Lymphatic: No lymphadenopathy  Cardiovascular: Normal S1 S2,RRR, No JVD, No murmurs  Respiratory: Lungs clear to auscultation	  Gastrointestinal:  Soft, Non-tender, + BS	  Skin: No rashes, No ecchymoses, No cyanosis	  Neurologic: Non-focal  Extremities: Normal range of motion, No clubbing, cyanosis or edema  Vascular: Peripheral pulses palpable 2+ bilaterally    TELEMETRY: 	    ECG:  	NSR @ 71 bpm  RADIOLOGY:  OTHER: 	  	  LABS:	 	    CARDIAC MARKERS:                                  7.7    5.0   )-----------( 222      ( 02 Jul 2017 06:53 )             23.9     07-02    134<L>  |  95<L>  |  32<H>  ----------------------------<  433<H>  5.3   |  24  |  4.55<H>    Ca    7.8<L>      02 Jul 2017 06:53  Phos  5.4     07-01  Mg     2.0     07-01    TPro  7.1  /  Alb  4.1  /  TBili  0.4  /  DBili  x   /  AST  61<H>  /  ALT  39  /  AlkPhos  252<H>  06-30      proBNP:   Lipid Profile:   HgA1c:   TSH:     ASSESSMENT/PLAN:
HPI:  Pt is a 60yo F w/pmhx of uncontrolled T1DM on insulin pump, CAD, PAD, HTN, HLE who now presents with LLE edema/swelling.      Endocrine History: T1DM diagnosed 40 years ago with neuropathy, retinopathy, ESRD on HD, CAD, CVA, PAD s/p bypass, recently had LLE angioplasty and was discharged to home. Has been on Medtronic Pump for 3 years and home settings are:    Total Basal -- 10.175 units  12am-0.35  3:30am-0.50  6am-0.425  ICR  12a-9  11a-10  3p-9  ISF  12a-60  7a-40  6p-60  -130    Eats 3 meals with consistent carbs including eggs, pacheco, low salt ham or turkey, tuna fish, chicken, no drinks  Has hypoglycemia unawareness and FS mostly in 150s at home, check 4x/day    Last ophtho 1 year ago.    Patient did not have insulin supplies and was given lantus 10u overnight. Today patient had hyperglycemia after eating and not receiving premeal insulin. Pt denies any complaints.        PAST MEDICAL & SURGICAL HISTORY:  Subclavian vein stenosis, left: s/p stent  Cellulitis: 2015 left foot  DKA, type 1: 1/2015  ACS (acute coronary syndrome): 1/2015 - cath revealed 100% ostial stenosis not amenable to PCI - medical management  MI, old  TIA (transient ischemic attack): x 2 - 8-9 years ago prior to ASD/VSD repair  HTN (hypertension)  CAD (coronary artery disease): s/p stents  Murmur, cardiac  Gout: past  CVA (cerebral infarction): with no residual, 8 yrs ago, prior to heart surgery - ST memory loss  Peripheral vascular disease: occluded left fem-pop bypass 5/2015  Diabetes mellitus type 1: Insulin Dependent - Medtronic  Minimed Paradigm Insulin Pump - Novolog  ESRD (end stage renal disease): dialysis , tue, thursday, saturday  Hyperlipidemia  Status post device closure of ASD: &quot;clamshell&quot;  History of cardiac catheterization: 1/2015 - no intervention  S/P femoral-popliteal bypass surgery: L and R in 2013 with graft; 5/2015 CFA angioplasty left and ileofemoral endarterectomywith vein patch angioplasty of left fem-pop bypass graft  Multiple vascular surgery both leg, left fempop bypass revision 11/2015  AV (arteriovenous fistula): Left AV graft; revision with stent placement 2-3 years ago  S/P cholecystectomy      FAMILY HISTORY:  Family history of smoking  Family history of hypertension  Family history of cancer (Sibling)      Social History:former smoker    Outpatient Medications:  clopidogrel 75 mg oral tablet: 1 tab(s) orally once a day (at bedtime)  · 	hydrALAZINE 25 mg oral tablet: 4 tab(s) orally every 8 hours  · 	furosemide 40 mg oral tablet: 1 tab(s) orally 3 times a week on Monday , Friday and Sunday  · 	lisinopril 40 mg oral tablet: 1 tab(s) orally once a day  · 	DULoxetine 60 mg oral delayed release capsule: 1 cap(s) orally once a day  · 	atorvastatin 20 mg oral tablet: 1 tab(s) orally once a day (at bedtime)  · 	aspirin 81 mg oral delayed release tablet: 1 tab(s) orally once a day  · 	metoprolol succinate 50 mg oral tablet, extended release: 1 tab(s) orally once a day  · 	Multiple Vitamins oral tablet: 1 tab(s) orally once a day  · 	oxycodone-acetaminophen 5mg-325mg oral tablet: 2 tab(s) orally once a day (in the evening)  · 	HumaLOG 100 units/mL subcutaneous solution: Humalog Pump  	BASAL:  	00:00 - 0.3 U/Hr  	05:00 - 0.425 U/Hr  	  	Insulin to carb ratio  	00:00 1U:9gram  	11:00 1U:10gram  	15:00 1U:9gram  	  	Insulin Sensitivity factor  	00:00 ISF 60  	07:00 ISF 40  	18:00 ISF 60  	  	Blood glucose target:  	00:00 110-130  	08:00 100-120  	  · 	Renvela 800 mg oral tablet: 3 tab(s) orally 3 times a day  · 	cinacalcet 60 mg oral tablet: 1 tab(s) orally once a day    MEDICATIONS  (STANDING):  insulin glargine Injectable (LANTUS) 10 Unit(s) SubCutaneous at bedtime  aspirin enteric coated 81 milliGRAM(s) Oral daily  lisinopril 40 milliGRAM(s) Oral daily  DULoxetine 60 milliGRAM(s) Oral daily  atorvastatin 20 milliGRAM(s) Oral at bedtime  clopidogrel Tablet 75 milliGRAM(s) Oral at bedtime  metoprolol succinate ER 50 milliGRAM(s) Oral daily  furosemide    Tablet 40 milliGRAM(s) Oral <User Schedule>  cinacalcet 60 milliGRAM(s) Oral daily  sevelamer hydrochloride 2400 milliGRAM(s) Oral three times a day with meals  hydrALAZINE 100 milliGRAM(s) Oral every 8 hours  multivitamin 1 Tablet(s) Oral daily  heparin  Injectable 5000 Unit(s) SubCutaneous every 12 hours  insulin lispro (HumaLOG) corrective regimen sliding scale   SubCutaneous three times a day before meals  insulin lispro (HumaLOG) corrective regimen sliding scale   SubCutaneous at bedtime    MEDICATIONS  (PRN):      Allergies    No Known Allergies    Intolerances      Review of Systems:  Constitutional: No fever  Eyes: No blurry vision  Neuro: No tremors  HEENT: No pain  Cardiovascular: No chest pain, palpitations, LE swelling  Respiratory: No SOB, no cough  GI: No nausea, vomiting, abdominal pain  : No dysuria  Skin: no rash  Psych: no depression  Endocrine: no polyuria, polydipsia  Hem/lymph: no swelling  Osteoporosis: no fractures    ALL OTHER SYSTEMS REVIEWED AND NEGATIVE    PHYSICAL EXAM:  VITALS: T(C): 36.7 (07-01-17 @ 13:35)  T(F): 98.1 (07-01-17 @ 13:35), Max: 98.7 (06-30-17 @ 20:52)  HR: 76 (07-01-17 @ 13:35) (75 - 81)  BP: 155/67 (07-01-17 @ 13:35) (149/67 - 190/76)  RR:  (16 - 22)  SpO2:  (95% - 100%)  Wt(kg): --  GENERAL: NAD, well-groomed, well-developed  EYES: No proptosis, no lid lag, anicteric  HEENT:  Atraumatic, Normocephalic, moist mucous membranes  THYROID: Normal size, no palpable nodules  RESPIRATORY: Clear to auscultation bilaterally; No rales, rhonchi, wheezing, or rubs  CARDIOVASCULAR: Regular rate and rhythm; No murmurs; no peripheral edema  GI: Soft, nontender, non distended, normal bowel sounds  SKIN: Dry, intact, No rashes or lesions  MUSCULOSKELETAL: Full range of motion, normal strength  NEURO: sensation intact, extraocular movements intact, no tremor, normal reflexes  PSYCH: Alert and oriented x 3, normal affect, normal mood  CUSHING'S SIGNS: no striae    CAPILLARY BLOOD GLUCOSE  364 (07-01 @ 12:32)  127 (07-01 @ 08:45)  107 (07-01 @ 06:04)  193 (07-01 @ 02:24)                            7.8    4.7   )-----------( 195      ( 01 Jul 2017 07:21 )             21.7       07-01    137  |  91<L>  |  58<H>  ----------------------------<  90  4.6   |  25  |  7.38<H>    EGFR if : 6<L>  EGFR if non : 6<L>    Ca    8.2<L>      07-01  Mg     2.0     07-01  Phos  5.4     07-01    TPro  7.1  /  Alb  4.1  /  TBili  0.4  /  DBili  x   /  AST  61<H>  /  ALT  39  /  AlkPhos  252<H>  06-30      Thyroid Function Tests:      Hemoglobin A1C, Whole Blood: 7.3 % <H> [4.0 - 5.6] (06-16-17 @ 04:45)  Hemoglobin A1C, Whole Blood: 8.5 % <H> [4.0 - 5.6] (04-11-17 @ 07:12)
[facility]  [phone]  Dr. Urban (covering for Dr. Burger)  NEPHROLOGY  INITIAL CONSULT NOTE  --------------------------------------------------------------------------------  HPI: 60 y/o female with below stated PMHx, including ESRD on HD T/T/S with last full treatment two days ago, who presents with LLE edema/pain.  Patient is s/p recent LLE bypass.  In ED patient found to have hyperkalemia and hyperglycemia, treated with insulin and lasix.  Renal eval requested to arrange inpatient HD.        PAST HISTORY  --------------------------------------------------------------------------------  PAST MEDICAL & SURGICAL HISTORY:  Subclavian vein stenosis, left: s/p stent  Cellulitis: 2015 left foot  DKA, type 1: 1/2015  ACS (acute coronary syndrome): 1/2015 - cath revealed 100% ostial stenosis not amenable to PCI - medical management  MI, old  TIA (transient ischemic attack): x 2 - 8-9 years ago prior to ASD/VSD repair  HTN (hypertension)  CAD (coronary artery disease): s/p stents  Murmur, cardiac  Gout: past  CVA (cerebral infarction): with no residual, 8 yrs ago, prior to heart surgery - ST memory loss  Peripheral vascular disease: occluded left fem-pop bypass 5/2015  Diabetes mellitus type 1: Insulin Dependent - Medtronic  Minimed Paradigm Insulin Pump - Novolog  ESRD (end stage renal disease): dialysis , tue, thursday, saturday  Hyperlipidemia  Status post device closure of ASD: &quot;clamshell&quot;  History of cardiac catheterization: 1/2015 - no intervention  S/P femoral-popliteal bypass surgery: L and R in 2013 with graft; 5/2015 CFA angioplasty left and ileofemoral endarterectomywith vein patch angioplasty of left fem-pop bypass graft  Multiple vascular surgery both leg, left fempop bypass revision 11/2015  AV (arteriovenous fistula): Left AV graft; revision with stent placement 2-3 years ago  S/P cholecystectomy    FAMILY HISTORY:  Family history of smoking  Family history of hypertension  Family history of cancer (Sibling)    PAST SOCIAL HISTORY:  Denies Current Tobacco/ETOH/Illicit Drug use  Quit smoking ~15 years ago    ALLERGIES & MEDICATIONS  --------------------------------------------------------------------------------  Allergies    No Known Allergies    Intolerances      Standing Inpatient Medications  insulin glargine Injectable (LANTUS) 10 Unit(s) SubCutaneous at bedtime  aspirin enteric coated 81 milliGRAM(s) Oral daily  lisinopril 40 milliGRAM(s) Oral daily  DULoxetine 60 milliGRAM(s) Oral daily  atorvastatin 20 milliGRAM(s) Oral at bedtime  clopidogrel Tablet 75 milliGRAM(s) Oral at bedtime  metoprolol succinate ER 50 milliGRAM(s) Oral daily  furosemide    Tablet 40 milliGRAM(s) Oral <User Schedule>  cinacalcet 60 milliGRAM(s) Oral daily  sevelamer hydrochloride 2400 milliGRAM(s) Oral three times a day with meals  hydrALAZINE 100 milliGRAM(s) Oral every 8 hours  multivitamin 1 Tablet(s) Oral daily  heparin  Injectable 5000 Unit(s) SubCutaneous every 12 hours  insulin lispro (HumaLOG) corrective regimen sliding scale   SubCutaneous three times a day before meals  insulin lispro (HumaLOG) corrective regimen sliding scale   SubCutaneous at bedtime    PRN Inpatient Medications      REVIEW OF SYSTEMS  --------------------------------------------------------------------------------  General: + fatigue denies fevers/chills/rigors  CVS: Denies CP/palpitations  Respiratory:  Denies SOB/COugh  GI: Denies N/V/Abd pain.  Reports decreased appetite  : Still makes some urine.  Denies dysuria  Neuro: Denies headache/numbness.  + LLE pain    All other systems were reviewed and are negative, except as noted.    VITALS/PHYSICAL EXAM  --------------------------------------------------------------------------------  T(C): 36.8 (07-01-17 @ 04:24), Max: 37.1 (06-30-17 @ 20:52)  HR: 81 (07-01-17 @ 04:24) (75 - 81)  BP: 149/67 (07-01-17 @ 04:24) (149/67 - 190/76)  RR: 20 (07-01-17 @ 04:24) (20 - 22)  SpO2: 98% (07-01-17 @ 04:24) (95% - 99%)  Wt(kg): --  Height (cm): 162.56 (07-01-17 @ 01:06)  Weight (kg): 63.3 (07-01-17 @ 01:06)  BMI (kg/m2): 24 (07-01-17 @ 01:06)  BSA (m2): 1.68 (07-01-17 @ 01:06)      06-30-17 @ 07:01  -  07-01-17 @ 07:00  --------------------------------------------------------  IN: 120 mL / OUT: 0 mL / NET: 120 mL      Physical Exam:  	Gen: NAD, well-appearing  	Pulm: CTA B/L no w/r/r  	CV: RRR, S1S2  	Abd: +BS, soft, nontender/nondistended  	: No suprapubic tenderness, no irene  	Extremity: LLE edema and erythema, +TTP  	Neuro: A&Ox3  	Vascular access: LUE AVF + thrill with moderate pulsatility    LABS/STUDIES  --------------------------------------------------------------------------------              7.8    4.7   >-----------<  195      [07-01-17 @ 07:21]              21.7     137  |  91  |  58  ----------------------------<  90      [07-01-17 @ 07:21]  4.6   |  25  |  7.38        Ca     8.2     [07-01-17 @ 07:21]      Mg     2.0     [07-01-17 @ 07:21]      Phos  5.4     [07-01-17 @ 07:21]    TPro  7.1  /  Alb  4.1  /  TBili  0.4  /  DBili  x   /  AST  61  /  ALT  39  /  AlkPhos  252  [06-30-17 @ 22:23]              [07-01-17 @ 07:21]    eGFR if : 6 mL/min/1.73M2 (07-01 @ 07:21)  eGFR if : 7 mL/min/1.73M2 (07-01 @ 01:43)  eGFR if : 7 mL/min/1.73M2 (06-30 @ 22:23)    eGFR if Non African American: 6 mL/min/1.73M2 (07-01 @ 07:21)  eGFR if Non African American: 6 mL/min/1.73M2 (07-01 @ 01:43)  eGFR if Non African American: 6 mL/min/1.73M2 (06-30 @ 22:23)    Creatinine Trend:  SCr 7.38 [07-01 @ 07:21]  SCr 7.02 [07-01 @ 01:43]  SCr 6.81 [06-30 @ 22:23]  SCr 4.25 [06-18 @ 02:36]  SCr 7.32 [06-17 @ 02:48]    Urinalysis - [06-04-17 @ 08:24]      Color Yellow / Appearance Clear / SG 1.013 / pH 8.5      Gluc 1000 / Ketone Negative  / Bili Negative / Urobili Negative       Blood Negative / Protein >600 / Leuk Est Small / Nitrite Negative      RBC 3-5 / WBC 6-10 / Hyaline  / Gran  / Sq Epi  / Non Sq Epi OCC / Bacteria       HbA1c 7.3      [06-16-17 @ 04:45]    HBsAb <3.0      [06-20-17 @ 16:10]  HBsAb Nonreact      [05-02-15 @ 15:35]  HBsAg Nonreact      [05-10-17 @ 20:00]  HBcAb Nonreact      [06-20-17 @ 16:10]  HCV 0.18, Nonreact      [05-10-17 @ 20:00]        Assessment / Plan:

## 2017-07-02 NOTE — PROGRESS NOTE ADULT - SUBJECTIVE AND OBJECTIVE BOX
Patient is a 61y old  Female who presents with a chief complaint of Left leg swelling (01 Jul 2017 03:54)      SUBJECTIVE / OVERNIGHT EVENTS: c/o sob.  Review of Systems  chest pain no  palpitations no  sob no  nausea no  headache no    MEDICATIONS  (STANDING):  aspirin enteric coated 81 milliGRAM(s) Oral daily  lisinopril 40 milliGRAM(s) Oral daily  DULoxetine 60 milliGRAM(s) Oral daily  atorvastatin 20 milliGRAM(s) Oral at bedtime  clopidogrel Tablet 75 milliGRAM(s) Oral at bedtime  metoprolol succinate ER 50 milliGRAM(s) Oral daily  furosemide    Tablet 40 milliGRAM(s) Oral <User Schedule>  cinacalcet 60 milliGRAM(s) Oral daily  sevelamer hydrochloride 2400 milliGRAM(s) Oral three times a day with meals  hydrALAZINE 100 milliGRAM(s) Oral every 8 hours  multivitamin 1 Tablet(s) Oral daily  heparin  Injectable 5000 Unit(s) SubCutaneous every 12 hours  insulin lispro (HumaLOG) Pump 1 Each SubCutaneous Continuous Pump  dextrose 5%. 1000 milliLiter(s) (50 mL/Hr) IV Continuous <Continuous>  dextrose 50% Injectable 12.5 Gram(s) IV Push once  dextrose 50% Injectable 25 Gram(s) IV Push once  dextrose 50% Injectable 25 Gram(s) IV Push once  furosemide   Injectable 80 milliGRAM(s) IV Push once    MEDICATIONS  (PRN):  dextrose Gel 1 Dose(s) Oral once PRN Blood Glucose LESS THAN 70 milliGRAM(s)/deciLiter  glucagon  Injectable 1 milliGRAM(s) IntraMuscular once PRN Glucose <70 milliGRAM(s)/deciLiter      Vital Signs Last 24 Hrs  T(C): 36.9 (07-02-17 @ 04:29), Max: 37 (07-01-17 @ 21:12)  HR: 79 (07-02-17 @ 04:29) (62 - 82)  BP: 154/71 (07-02-17 @ 04:29) (154/71 - 170/78)  RR: 18 (07-02-17 @ 04:29) (16 - 18)  SpO2: 99% (07-02-17 @ 04:29) (99% - 100%)  CAPILLARY BLOOD GLUCOSE  306 (02 Jul 2017 08:47)  266 (01 Jul 2017 21:42)  98 (01 Jul 2017 17:51)        I&O's Summary    01 Jul 2017 07:01  -  02 Jul 2017 07:00  --------------------------------------------------------  IN: 1440 mL / OUT: 2600 mL / NET: -1160 mL        PHYSICAL EXAM:  GENERAL: NAD, well-developed  HEAD:  Atraumatic, Normocephalic  EYES: EOMI, PERRLA, conjunctiva and sclera clear  NECK: Supple, No JVD  CHEST/LUNG: Clear to auscultation bilaterally; No wheeze  HEART: Regular rate and rhythm; No murmurs, rubs, or gallops  ABDOMEN: Soft, Nontender, Nondistended; Bowel sounds present  EXTREMITIES:  2+ Peripheral Pulses, No clubbing, cyanosis, or edema  PSYCH: AAOx3  NEUROLOGY: non-focal  SKIN: No rashes or lesions    LABS:                        7.7    5.0   )-----------( 222      ( 02 Jul 2017 06:53 )             23.9     07-02    134<L>  |  95<L>  |  32<H>  ----------------------------<  433<H>  5.3   |  24  |  4.55<H>    Ca    7.8<L>      02 Jul 2017 06:53  Phos  5.4     07-01  Mg     2.0     07-01    TPro  7.1  /  Alb  4.1  /  TBili  0.4  /  DBili  x   /  AST  61<H>  /  ALT  39  /  AlkPhos  252<H>  06-30              RADIOLOGY & ADDITIONAL TESTS:    Imaging Personally Reviewed:    Consultant(s) Notes Reviewed:      Care Discussed with Consultants/Other Providers:

## 2017-07-02 NOTE — PROGRESS NOTE ADULT - PROBLEM SELECTOR PLAN 4
Pt with LLE edema.  Based on recent duplex exam may be related to stenosis of vein grafts and graft thrombus.  Check LLE duplex  Vascular consult in AM Pt with LLE edema.  Based on recent duplex exam may be related to stenosis of vein grafts and graft thrombus.  Check LLE duplex  Vascular consult pending

## 2017-07-02 NOTE — PROGRESS NOTE ADULT - PROBLEM SELECTOR PLAN 1
Recommend continue insulin pump. Patient is very brittle so we will monitor insulin needs and appetite x 24 hours to determine insulin pump adjustments. Incase FS are persistently>350, can do temp basal of 110% for 24 hours. Last site change was yesterday. Recommend aggressive leg pain control.    D/w attending    Carolina Nixon DO  Endocrine Fellow   Pager: [phone].

## 2017-07-02 NOTE — PROGRESS NOTE ADULT - PROBLEM SELECTOR PLAN 1
s/p full treatment yesterday  Weight is 2kg down  Patient was not overtly SOB yesterday  Will plan for repeat HD/UF today, but would eval for other potential causes of SOB...

## 2017-07-02 NOTE — PROGRESS NOTE ADULT - ASSESSMENT
60 y/o female with poorly controlled brittle T1DM with PAD s/p stent, ESRD on HD, retinopathy here with left leg edema and pain 62 y/o female with poorly controlled brittle T1DM with PAD s/p stent, ESRD on HD, retinopathy here with left leg edema and pain.

## 2017-07-03 DIAGNOSIS — R91.8 OTHER NONSPECIFIC ABNORMAL FINDING OF LUNG FIELD: ICD-10-CM

## 2017-07-03 LAB
ANION GAP SERPL CALC-SCNC: 14 MMOL/L — SIGNIFICANT CHANGE UP (ref 5–17)
BUN SERPL-MCNC: 23 MG/DL — SIGNIFICANT CHANGE UP (ref 7–23)
CALCIUM SERPL-MCNC: 8 MG/DL — LOW (ref 8.4–10.5)
CHLORIDE SERPL-SCNC: 96 MMOL/L — SIGNIFICANT CHANGE UP (ref 96–108)
CK MB BLD-MCNC: 1.8 % — SIGNIFICANT CHANGE UP (ref 0–3.5)
CK MB CFR SERPL CALC: 3.7 NG/ML — SIGNIFICANT CHANGE UP (ref 0–3.8)
CK SERPL-CCNC: 202 U/L — HIGH (ref 25–170)
CO2 SERPL-SCNC: 29 MMOL/L — SIGNIFICANT CHANGE UP (ref 22–31)
CREAT SERPL-MCNC: 4.22 MG/DL — HIGH (ref 0.5–1.3)
GLUCOSE SERPL-MCNC: 145 MG/DL — HIGH (ref 70–99)
HCT VFR BLD CALC: 23.7 % — LOW (ref 34.5–45)
HGB BLD-MCNC: 8 G/DL — LOW (ref 11.5–15.5)
MAGNESIUM SERPL-MCNC: 2.1 MG/DL — SIGNIFICANT CHANGE UP (ref 1.6–2.6)
MCHC RBC-ENTMCNC: 33.9 GM/DL — SIGNIFICANT CHANGE UP (ref 32–36)
MCHC RBC-ENTMCNC: 34.7 PG — HIGH (ref 27–34)
MCV RBC AUTO: 102 FL — HIGH (ref 80–100)
PHOSPHATE SERPL-MCNC: 3.1 MG/DL — SIGNIFICANT CHANGE UP (ref 2.5–4.5)
PLATELET # BLD AUTO: 214 K/UL — SIGNIFICANT CHANGE UP (ref 150–400)
POTASSIUM SERPL-MCNC: 4.7 MMOL/L — SIGNIFICANT CHANGE UP (ref 3.5–5.3)
POTASSIUM SERPL-SCNC: 4.7 MMOL/L — SIGNIFICANT CHANGE UP (ref 3.5–5.3)
RBC # BLD: 2.31 M/UL — LOW (ref 3.8–5.2)
RBC # FLD: 12.7 % — SIGNIFICANT CHANGE UP (ref 10.3–14.5)
SODIUM SERPL-SCNC: 139 MMOL/L — SIGNIFICANT CHANGE UP (ref 135–145)
TROPONIN T SERPL-MCNC: 0.12 NG/ML — HIGH (ref 0–0.06)
WBC # BLD: 4.7 K/UL — SIGNIFICANT CHANGE UP (ref 3.8–10.5)
WBC # FLD AUTO: 4.7 K/UL — SIGNIFICANT CHANGE UP (ref 3.8–10.5)

## 2017-07-03 PROCEDURE — 99232 SBSQ HOSP IP/OBS MODERATE 35: CPT | Mod: GC

## 2017-07-03 RX ORDER — OXYCODONE HYDROCHLORIDE 5 MG/1
5 TABLET ORAL ONCE
Qty: 0 | Refills: 0 | Status: DISCONTINUED | OUTPATIENT
Start: 2017-07-03 | End: 2017-07-03

## 2017-07-03 RX ADMIN — Medication 80 MILLIGRAM(S): at 21:52

## 2017-07-03 RX ADMIN — SEVELAMER CARBONATE 2400 MILLIGRAM(S): 2400 POWDER, FOR SUSPENSION ORAL at 18:41

## 2017-07-03 RX ADMIN — ATORVASTATIN CALCIUM 20 MILLIGRAM(S): 80 TABLET, FILM COATED ORAL at 21:48

## 2017-07-03 RX ADMIN — CINACALCET 60 MILLIGRAM(S): 30 TABLET, FILM COATED ORAL at 00:22

## 2017-07-03 RX ADMIN — Medication 81 MILLIGRAM(S): at 13:24

## 2017-07-03 RX ADMIN — Medication 100 MILLIGRAM(S): at 06:25

## 2017-07-03 RX ADMIN — CLOPIDOGREL BISULFATE 75 MILLIGRAM(S): 75 TABLET, FILM COATED ORAL at 00:23

## 2017-07-03 RX ADMIN — Medication 50 MILLIGRAM(S): at 06:25

## 2017-07-03 RX ADMIN — DULOXETINE HYDROCHLORIDE 60 MILLIGRAM(S): 30 CAPSULE, DELAYED RELEASE ORAL at 13:25

## 2017-07-03 RX ADMIN — Medication 100 MILLIGRAM(S): at 21:49

## 2017-07-03 RX ADMIN — Medication 80 MILLIGRAM(S): at 10:31

## 2017-07-03 RX ADMIN — CINACALCET 60 MILLIGRAM(S): 30 TABLET, FILM COATED ORAL at 13:24

## 2017-07-03 RX ADMIN — Medication 1 TABLET(S): at 13:25

## 2017-07-03 RX ADMIN — OXYCODONE HYDROCHLORIDE 5 MILLIGRAM(S): 5 TABLET ORAL at 23:42

## 2017-07-03 RX ADMIN — SEVELAMER CARBONATE 2400 MILLIGRAM(S): 2400 POWDER, FOR SUSPENSION ORAL at 10:25

## 2017-07-03 RX ADMIN — Medication 100 MILLIGRAM(S): at 00:23

## 2017-07-03 RX ADMIN — ATORVASTATIN CALCIUM 20 MILLIGRAM(S): 80 TABLET, FILM COATED ORAL at 00:23

## 2017-07-03 RX ADMIN — Medication 80 MILLIGRAM(S): at 00:23

## 2017-07-03 RX ADMIN — Medication 100 MILLIGRAM(S): at 13:26

## 2017-07-03 RX ADMIN — DULOXETINE HYDROCHLORIDE 60 MILLIGRAM(S): 30 CAPSULE, DELAYED RELEASE ORAL at 00:23

## 2017-07-03 RX ADMIN — LISINOPRIL 40 MILLIGRAM(S): 2.5 TABLET ORAL at 06:25

## 2017-07-03 RX ADMIN — HEPARIN SODIUM 5000 UNIT(S): 5000 INJECTION INTRAVENOUS; SUBCUTANEOUS at 06:25

## 2017-07-03 RX ADMIN — CLOPIDOGREL BISULFATE 75 MILLIGRAM(S): 75 TABLET, FILM COATED ORAL at 21:48

## 2017-07-03 RX ADMIN — SEVELAMER CARBONATE 2400 MILLIGRAM(S): 2400 POWDER, FOR SUSPENSION ORAL at 13:23

## 2017-07-03 NOTE — PROGRESS NOTE ADULT - PROBLEM SELECTOR PLAN 3
Severe hyperglycemia  Endocrine consulted  Started on lantus and received humalog in ED->FS now WNL, hold off on further humalog for now.  formal endocrine consult noted  ISS qAC qhs
Severe hyperglycemia  Endocrine consulted  Started on lantus and received humalog in ED->FS now WNL, hold off on further humalog for now.  formal endocrine consult noted  ISS qAC qhs
Patient has Doppler from early June showing LLE graft thrombosis, patent LLE reverse graft with in-graft stenoses.  Would reconsult vascular for re-evaluation

## 2017-07-03 NOTE — PROGRESS NOTE ADULT - SUBJECTIVE AND OBJECTIVE BOX
Chief Complaint: F/u T1DM on insulin pump    History: 62 y/o female with poorly controlled T1DM with multiple complications and comorbid medical conditions. LLE edema & pain has significantly improved. She is eating her meals well. Next site change tonight or tomorrow, has supplies.    MEDICATIONS  (STANDING):  aspirin enteric coated 81 milliGRAM(s) Oral daily  lisinopril 40 milliGRAM(s) Oral daily  DULoxetine 60 milliGRAM(s) Oral daily  atorvastatin 20 milliGRAM(s) Oral at bedtime  clopidogrel Tablet 75 milliGRAM(s) Oral at bedtime  metoprolol succinate ER 50 milliGRAM(s) Oral daily  cinacalcet 60 milliGRAM(s) Oral daily  sevelamer hydrochloride 2400 milliGRAM(s) Oral three times a day with meals  hydrALAZINE 100 milliGRAM(s) Oral every 8 hours  multivitamin 1 Tablet(s) Oral daily  heparin  Injectable 5000 Unit(s) SubCutaneous every 12 hours  insulin lispro (HumaLOG) Pump 1 Each SubCutaneous Continuous Pump  dextrose 5%. 1000 milliLiter(s) (50 mL/Hr) IV Continuous <Continuous>  dextrose 50% Injectable 12.5 Gram(s) IV Push once  dextrose 50% Injectable 25 Gram(s) IV Push once  dextrose 50% Injectable 25 Gram(s) IV Push once  furosemide   Injectable 80 milliGRAM(s) IV Push every 12 hours    MEDICATIONS  (PRN):  dextrose Gel 1 Dose(s) Oral once PRN Blood Glucose LESS THAN 70 milliGRAM(s)/deciLiter  glucagon  Injectable 1 milliGRAM(s) IntraMuscular once PRN Glucose <70 milliGRAM(s)/deciLiter      Allergies    No Known Allergies    Review of Systems:  Constitutional: No fever  Eyes: No blurry vision  Neuro: No tremors  HEENT: No pain  Cardiovascular: No chest pain, palpitations  Respiratory: No SOB, no cough  GI: No nausea, vomiting, abdominal pain  : No dysuria  Skin: no rash  Psych: no depression  Endocrine: no polyuria, polydipsia  Hem/lymph: + swelling in LLE has improved  Osteoporosis: no fractures    ALL OTHER SYSTEMS REVIEWED AND NEGATIVE    PHYSICAL EXAM:  VITALS: T(C): 36.8 (07-03-17 @ 12:32)  T(F): 98.2 (07-03-17 @ 12:32), Max: 98.9 (07-03-17 @ 00:26)  HR: 71 (07-03-17 @ 12:32) (71 - 87)  BP: 161/66 (07-03-17 @ 12:32) (151/68 - 183/73)  RR:  (17 - 18)  SpO2:  (98% - 100%)  GENERAL: NAD, thin disheaveled female  EYES: No proptosis, no lid lag, anicteric  HEENT:  Atraumatic, Normocephalic, moist mucous membranes  THYROID: Normal size, no palpable nodules  RESPIRATORY: Clear to auscultation bilaterally; No rales, rhonchi, wheezing, or rubs  CARDIOVASCULAR: Regular rate and rhythm; No murmurs; no peripheral edema  GI: Soft, nontender, non distended, normal bowel sounds  SKIN: Dry, intact, No rashes or lesions  MUSCULOSKELETAL: Full range of motion, normal strength, LLE edema has reduced.  NEURO: sensation diminished in b/l LE, extraocular movements intact, no tremor, normal reflexes  PSYCH: Alert and oriented x 3, normal affect, normal mood  CUSHING'S SIGNS: no striae    CAPILLARY BLOOD GLUCOSE    112 (07-03 @ 12:32)  90 (07-03 @ 08:43)    294 (07-02 @ 21:45)  252 (07-02 @ 18:13)  180 (07-02 @ 13:01)  306 (07-02 @ 08:47)    266 (07-01 @ 21:42)  98 (07-01 @ 17:51)  364 (07-01 @ 12:32)  127 (07-01 @ 08:45)  107 (07-01 @ 06:04)  193 (07-01 @ 02:24)      07-03    139  |  96  |  23  ----------------------------<  145<H>  4.7   |  29  |  4.22<H>    EGFR if : 13<L>  EGFR if non : 11<L>    Ca    8.0<L>      07-03  Mg     2.1     07-03  Phos  3.1     07-03    TPro  7.1  /  Alb  4.1  /  TBili  0.4  /  DBili  x   /  AST  61<H>  /  ALT  39  /  AlkPhos  252<H>  06-30    Hemoglobin A1C, Whole Blood: 7.3 % <H> [4.0 - 5.6] (06-16-17 @ 04:45)  Hemoglobin A1C, Whole Blood: 8.5 % <H> [4.0 - 5.6] (04-11-17 @ 07:12)

## 2017-07-03 NOTE — PROGRESS NOTE ADULT - ASSESSMENT
61y year old Female who presents with LLE edema, patient had bilateral fempop bypass, left fempop revision in May 2016 with multiple angioplasty revision. most recent in 6/9/2017 with a left lower extremity balloon angioplasty  - continue current medical management, no acute vascular surgery intervention indicated.  - DVT PPx  - Please obtain arterial duplex of lower extremities bilaterally to assess the graft, preferable including iliac artery  - please obtain bilateral venous duplex to r/o any venous thrombosis  - Vascular surgery team will follow.

## 2017-07-03 NOTE — PROGRESS NOTE ADULT - PROBLEM SELECTOR PLAN 1
Recommend continue insulin pump. Patient is very brittle so we will monitor insulin needs and appetite x 24 hours to determine insulin pump adjustments. Incase FS are persistently>350, can do temp basal of 110% for 24 hours. Next site change tonight or tomorrow. Recommend aggressive leg pain control.  Back up insulin doses are Lantus 10u QD and Humalog 4-6-6u TID AC and low SSI AC and HS.    D/w attending    Carolina Nixon DO  Endocrine Fellow   Pager: [phone].

## 2017-07-03 NOTE — PROGRESS NOTE ADULT - PROBLEM SELECTOR PLAN 2
Likely 2/2 volume overload  HD
Likely 2/2 volume overload  Needs HD today  Lasix 80 today
UF today  Would obtain CT Chest / CTA to eval for PE  will hold HD until after contrast load

## 2017-07-03 NOTE — PROGRESS NOTE ADULT - SUBJECTIVE AND OBJECTIVE BOX
PULMONARY PROGRESS NOTE    NATHAN MEEKS  MRN-[id number]    Patient is a 61y old  Female who presents with a chief complaint of Left leg swelling (01 Jul 2017 03:54)      HPI:  -dyspnea still present but somewhat improved from admission.  denies cough.      ROS:   -leg swelling greatly improved    ACTIVE MEDICATION LIST:  MEDICATIONS  (STANDING):  aspirin enteric coated 81 milliGRAM(s) Oral daily  lisinopril 40 milliGRAM(s) Oral daily  DULoxetine 60 milliGRAM(s) Oral daily  atorvastatin 20 milliGRAM(s) Oral at bedtime  clopidogrel Tablet 75 milliGRAM(s) Oral at bedtime  metoprolol succinate ER 50 milliGRAM(s) Oral daily  cinacalcet 60 milliGRAM(s) Oral daily  sevelamer hydrochloride 2400 milliGRAM(s) Oral three times a day with meals  hydrALAZINE 100 milliGRAM(s) Oral every 8 hours  multivitamin 1 Tablet(s) Oral daily  heparin  Injectable 5000 Unit(s) SubCutaneous every 12 hours  insulin lispro (HumaLOG) Pump 1 Each SubCutaneous Continuous Pump  dextrose 5%. 1000 milliLiter(s) (50 mL/Hr) IV Continuous <Continuous>  dextrose 50% Injectable 12.5 Gram(s) IV Push once  dextrose 50% Injectable 25 Gram(s) IV Push once  dextrose 50% Injectable 25 Gram(s) IV Push once  furosemide   Injectable 80 milliGRAM(s) IV Push every 12 hours    MEDICATIONS  (PRN):  dextrose Gel 1 Dose(s) Oral once PRN Blood Glucose LESS THAN 70 milliGRAM(s)/deciLiter  glucagon  Injectable 1 milliGRAM(s) IntraMuscular once PRN Glucose <70 milliGRAM(s)/deciLiter      EXAM:  Vital Signs Last 24 Hrs  T(C): 36.8 (03 Jul 2017 12:32), Max: 37.2 (03 Jul 2017 00:26)  T(F): 98.2 (03 Jul 2017 12:32), Max: 98.9 (03 Jul 2017 00:26)  HR: 71 (03 Jul 2017 12:32) (71 - 87)  BP: 161/66 (03 Jul 2017 12:32) (151/68 - 183/73)  BP(mean): --  RR: 18 (03 Jul 2017 12:32) (17 - 18)  SpO2: 98% (03 Jul 2017 12:32) (98% - 100%)    GENERAL: The patient is awake and alert in no apparent distress.     LUNGS: Crackles bilaterally    HEART: S1/S2    LABS/IMAGING: reviewed    ABG - ( 02 Jul 2017 15:27 )  pH: 7.42  /  pCO2: 41    /  pO2: 120   / HCO3: 26    / Base Excess: 2.3   /  SaO2: 99          < from: CT Angio Chest w/ IV Cont (07.02.17 @ 21:25) >  IMPRESSION:   No pulmonary embolism.    Mild pulmonary edema with small bilateral pleural effusions.    Scattered small pulmonary nodules. Recommend follow-up in 3 months. 11 mm   right upper lobe groundglass nodule series 2 image 30. This has been   progressively increasing in size and density since 2015. Neoplasm is not   excluded. Recommend pulmonary consultation.      < end of copied text >        PROBLEM LIST:  61y Female with HEALTH ISSUES - PROBLEM Dx:  dyspnea  volume overload  Lung nodules  Uncontrolled type 1 diabetes mellitus  ESRD (end stage renal disease) on dialysis      RECS:  -dyspnea secondary to volume overload/pulm edema.  Continue to remove fluid with HD, diuretics.  No CO2 retention.  -pulm nodule: known, being followed by  closely.  -incentive ALMA alfredo to chair.  -DVT prophylaxis    Alem Tate MD   [phone]

## 2017-07-03 NOTE — PROGRESS NOTE ADULT - PROBLEM SELECTOR PROBLEM 3
Edema of left lower extremity
Edema of left lower extremity
Hyperglycemia due to type 1 diabetes mellitus
Hyperglycemia due to type 1 diabetes mellitus

## 2017-07-03 NOTE — PROGRESS NOTE ADULT - ASSESSMENT
62 y/o female with poorly controlled brittle T1DM with PAD s/p stent, ESRD on HD, retinopathy here with left leg edema and pain.

## 2017-07-03 NOTE — PROGRESS NOTE ADULT - SUBJECTIVE AND OBJECTIVE BOX
CC no chest pain/ sob improving   c.o LLE pain but swelling resolved     PHYSICAL EXAM:  T(C): 36.8 (07-03-17 @ 04:00), Max: 37.2 (07-03-17 @ 00:26)  HR: 79 (07-03-17 @ 04:00) (76 - 87)  BP: 164/70 (07-03-17 @ 04:00) (145/81 - 183/73)  RR: 17 (07-03-17 @ 04:00) (17 - 18)  SpO2: 98% (07-03-17 @ 04:00) (97% - 100%)  Wt(kg): --  I&O's Summary    02 Jul 2017 07:01  -  03 Jul 2017 07:00  --------------------------------------------------------  IN: 440 mL / OUT: 2200 mL / NET: -1760 mL    03 Jul 2017 07:01  -  03 Jul 2017 12:32  --------------------------------------------------------  IN: 120 mL / OUT: 0 mL / NET: 120 mL        Appearance: Normal	  Cardiovascular: Normal S1 S2,RRR, No JVD, No murmurs  Respiratory: + fine crackles at base   Gastrointestinal:  Soft, Non-tender, + BS	  Extremities: Normal range of motion, No clubbing, cyanosis or edema        MEDICATIONS  (STANDING):  aspirin enteric coated 81 milliGRAM(s) Oral daily  lisinopril 40 milliGRAM(s) Oral daily  DULoxetine 60 milliGRAM(s) Oral daily  atorvastatin 20 milliGRAM(s) Oral at bedtime  clopidogrel Tablet 75 milliGRAM(s) Oral at bedtime  metoprolol succinate ER 50 milliGRAM(s) Oral daily  cinacalcet 60 milliGRAM(s) Oral daily  sevelamer hydrochloride 2400 milliGRAM(s) Oral three times a day with meals  hydrALAZINE 100 milliGRAM(s) Oral every 8 hours  multivitamin 1 Tablet(s) Oral daily  heparin  Injectable 5000 Unit(s) SubCutaneous every 12 hours  insulin lispro (HumaLOG) Pump 1 Each SubCutaneous Continuous Pump  dextrose 5%. 1000 milliLiter(s) (50 mL/Hr) IV Continuous <Continuous>  dextrose 50% Injectable 12.5 Gram(s) IV Push once  dextrose 50% Injectable 25 Gram(s) IV Push once  dextrose 50% Injectable 25 Gram(s) IV Push once  furosemide   Injectable 80 milliGRAM(s) IV Push every 12 hours      TELEMETRY: 	  NSR 70- 80     RADIOLOGY:   DIAGNOSTIC TESTING:  [ ] Echocardiogram:  [ ]  Catheterization:  [ ] Stress Test:    OTHER: 	  < from: CT Angio Chest w/ IV Cont (07.02.17 @ 21:25) >  IMPRESSION:   No pulmonary embolism.    Mild pulmonary edema with small bilateral pleural effusions.    Scattered small pulmonary nodules. Recommend follow-up in 3 months. 11 mm   right upper lobe groundglass nodule series 2 image 30. This has been   progressively increasing in size and density since 2015. Neoplasm is not   excluded. Recommend pulmonary consultation.        < end of copied text >    LABS:	 	                                8.0    4.7   )-----------( 214      ( 03 Jul 2017 06:37 )             23.7     07-03    139  |  96  |  23  ----------------------------<  145<H>  4.7   |  29  |  4.22<H>    Ca    8.0<L>      03 Jul 2017 10:03  Phos  3.1     07-03  Mg     2.1     07-03 CC no chest pain/ sob improving   c.o LLE pain but swelling resolved     PHYSICAL EXAM:  T(C): 36.8 (07-03-17 @ 04:00), Max: 37.2 (07-03-17 @ 00:26)  HR: 79 (07-03-17 @ 04:00) (76 - 87)  BP: 164/70 (07-03-17 @ 04:00) (145/81 - 183/73)  RR: 17 (07-03-17 @ 04:00) (17 - 18)  SpO2: 98% (07-03-17 @ 04:00) (97% - 100%)  Wt(kg): --  I&O's Summary    02 Jul 2017 07:01  -  03 Jul 2017 07:00  --------------------------------------------------------  IN: 440 mL / OUT: 2200 mL / NET: -1760 mL    03 Jul 2017 07:01  -  03 Jul 2017 12:32  --------------------------------------------------------  IN: 120 mL / OUT: 0 mL / NET: 120 mL        Appearance: Normal	  Cardiovascular: Normal S1 S2,RRR, No JVD, No murmurs  Respiratory: + fine crackles at base   Gastrointestinal:  Soft, Non-tender, + BS	  Extremities: Normal range of motion, No clubbing, cyanosis or edema        MEDICATIONS  (STANDING):  aspirin enteric coated 81 milliGRAM(s) Oral daily  lisinopril 40 milliGRAM(s) Oral daily  DULoxetine 60 milliGRAM(s) Oral daily  atorvastatin 20 milliGRAM(s) Oral at bedtime  clopidogrel Tablet 75 milliGRAM(s) Oral at bedtime  metoprolol succinate ER 50 milliGRAM(s) Oral daily  cinacalcet 60 milliGRAM(s) Oral daily  sevelamer hydrochloride 2400 milliGRAM(s) Oral three times a day with meals  hydrALAZINE 100 milliGRAM(s) Oral every 8 hours  multivitamin 1 Tablet(s) Oral daily  heparin  Injectable 5000 Unit(s) SubCutaneous every 12 hours  insulin lispro (HumaLOG) Pump 1 Each SubCutaneous Continuous Pump  dextrose 5%. 1000 milliLiter(s) (50 mL/Hr) IV Continuous <Continuous>  dextrose 50% Injectable 12.5 Gram(s) IV Push once  dextrose 50% Injectable 25 Gram(s) IV Push once  dextrose 50% Injectable 25 Gram(s) IV Push once  furosemide   Injectable 80 milliGRAM(s) IV Push every 12 hours      TELEMETRY: 	  NSR 70- 80   8 beat nonsustain v tach  x 1 episode     RADIOLOGY:   DIAGNOSTIC TESTING:  [ ] Echocardiogram:  [ ]  Catheterization:  [ ] Stress Test:    OTHER: 	  < from: CT Angio Chest w/ IV Cont (07.02.17 @ 21:25) >  IMPRESSION:   No pulmonary embolism.    Mild pulmonary edema with small bilateral pleural effusions.    Scattered small pulmonary nodules. Recommend follow-up in 3 months. 11 mm   right upper lobe groundglass nodule series 2 image 30. This has been   progressively increasing in size and density since 2015. Neoplasm is not   excluded. Recommend pulmonary consultation.        < end of copied text >    LABS:	 	                                8.0    4.7   )-----------( 214      ( 03 Jul 2017 06:37 )             23.7     07-03    139  |  96  |  23  ----------------------------<  145<H>  4.7   |  29  |  4.22<H>    Ca    8.0<L>      03 Jul 2017 10:03  Phos  3.1     07-03  Mg     2.1     07-03 CARDIOLOGY FOLLOW UP- Dr. Barron (For Dr. Vela)    CC no chest pain/ sob improving   c.o LLE pain but swelling resolved     PHYSICAL EXAM:  T(C): 36.8 (07-03-17 @ 04:00), Max: 37.2 (07-03-17 @ 00:26)  HR: 79 (07-03-17 @ 04:00) (76 - 87)  BP: 164/70 (07-03-17 @ 04:00) (145/81 - 183/73)  RR: 17 (07-03-17 @ 04:00) (17 - 18)  SpO2: 98% (07-03-17 @ 04:00) (97% - 100%)  Wt(kg): --  I&O's Summary    02 Jul 2017 07:01  -  03 Jul 2017 07:00  --------------------------------------------------------  IN: 440 mL / OUT: 2200 mL / NET: -1760 mL    03 Jul 2017 07:01  -  03 Jul 2017 12:32  --------------------------------------------------------  IN: 120 mL / OUT: 0 mL / NET: 120 mL        Appearance: Normal	  Cardiovascular: Normal S1 S2,RRR, No JVD, No murmurs  Respiratory: + fine crackles at base   Gastrointestinal:  Soft, Non-tender, + BS	  Extremities: Normal range of motion, No clubbing, cyanosis or edema        MEDICATIONS  (STANDING):  aspirin enteric coated 81 milliGRAM(s) Oral daily  lisinopril 40 milliGRAM(s) Oral daily  DULoxetine 60 milliGRAM(s) Oral daily  atorvastatin 20 milliGRAM(s) Oral at bedtime  clopidogrel Tablet 75 milliGRAM(s) Oral at bedtime  metoprolol succinate ER 50 milliGRAM(s) Oral daily  cinacalcet 60 milliGRAM(s) Oral daily  sevelamer hydrochloride 2400 milliGRAM(s) Oral three times a day with meals  hydrALAZINE 100 milliGRAM(s) Oral every 8 hours  multivitamin 1 Tablet(s) Oral daily  heparin  Injectable 5000 Unit(s) SubCutaneous every 12 hours  insulin lispro (HumaLOG) Pump 1 Each SubCutaneous Continuous Pump  dextrose 5%. 1000 milliLiter(s) (50 mL/Hr) IV Continuous <Continuous>  dextrose 50% Injectable 12.5 Gram(s) IV Push once  dextrose 50% Injectable 25 Gram(s) IV Push once  dextrose 50% Injectable 25 Gram(s) IV Push once  furosemide   Injectable 80 milliGRAM(s) IV Push every 12 hours      TELEMETRY: 	  NSR 70- 80   8 beat nonsustain v tach  x 1 episode     RADIOLOGY:   DIAGNOSTIC TESTING:  [ ] Echocardiogram:  [ ]  Catheterization:  [ ] Stress Test:    OTHER: 	  < from: CT Angio Chest w/ IV Cont (07.02.17 @ 21:25) >  IMPRESSION:   No pulmonary embolism.    Mild pulmonary edema with small bilateral pleural effusions.    Scattered small pulmonary nodules. Recommend follow-up in 3 months. 11 mm   right upper lobe groundglass nodule series 2 image 30. This has been   progressively increasing in size and density since 2015. Neoplasm is not   excluded. Recommend pulmonary consultation.        < end of copied text >    LABS:	 	                                8.0    4.7   )-----------( 214      ( 03 Jul 2017 06:37 )             23.7     07-03    139  |  96  |  23  ----------------------------<  145<H>  4.7   |  29  |  4.22<H>    Ca    8.0<L>      03 Jul 2017 10:03  Phos  3.1     07-03  Mg     2.1     07-03

## 2017-07-03 NOTE — PROGRESS NOTE ADULT - ASSESSMENT
1- ESRD  2- CHF  3- anemia  4- htn  5- CAD  6- SHPT      hd am   cont with O2.   hydralazine. ACE-I  vascular f/u noted and case d/w vascular.   cards follow up noted.   follow up ct scan chest.   sensipar to cont as well as renvela

## 2017-07-03 NOTE — PROGRESS NOTE ADULT - SUBJECTIVE AND OBJECTIVE BOX
Vascular Surgery Progress note      S: pt seen and examined, still with pain in L leg "all over" but feels the swelling has significantly improved        Vital Signs Last 24 Hrs  T(C): 36.8 (03 Jul 2017 04:00), Max: 37.2 (03 Jul 2017 00:26)  T(F): 98.3 (03 Jul 2017 04:00), Max: 98.9 (03 Jul 2017 00:26)  HR: 79 (03 Jul 2017 04:00) (76 - 87)  BP: 164/70 (03 Jul 2017 04:00) (145/81 - 183/73)  BP(mean): --  RR: 17 (03 Jul 2017 04:00) (17 - 18)  SpO2: 98% (03 Jul 2017 04:00) (97% - 100%)    Physical Exam:  Gen: Awake and alert, NAD  Lower extremities: LLE mildly swollen but not significantly different from R.  per pt is much improved.  still diffusely tender. b/l feet warm, motor intact, L plantar surface numb but has been for a long time per pt

## 2017-07-03 NOTE — PROGRESS NOTE ADULT - SUBJECTIVE AND OBJECTIVE BOX
Patient is a 61y old  Female who presents with a chief complaint of Left leg swelling (01 Jul 2017 03:54)      SUBJECTIVE / OVERNIGHT EVENTS: feels better improved sob, less leg pain.  Review of Systems  chest pain no  palpitations no  sob no  nausea no  headache no    MEDICATIONS  (STANDING):  aspirin enteric coated 81 milliGRAM(s) Oral daily  lisinopril 40 milliGRAM(s) Oral daily  DULoxetine 60 milliGRAM(s) Oral daily  atorvastatin 20 milliGRAM(s) Oral at bedtime  clopidogrel Tablet 75 milliGRAM(s) Oral at bedtime  metoprolol succinate ER 50 milliGRAM(s) Oral daily  cinacalcet 60 milliGRAM(s) Oral daily  sevelamer hydrochloride 2400 milliGRAM(s) Oral three times a day with meals  hydrALAZINE 100 milliGRAM(s) Oral every 8 hours  multivitamin 1 Tablet(s) Oral daily  heparin  Injectable 5000 Unit(s) SubCutaneous every 12 hours  insulin lispro (HumaLOG) Pump 1 Each SubCutaneous Continuous Pump  dextrose 5%. 1000 milliLiter(s) (50 mL/Hr) IV Continuous <Continuous>  dextrose 50% Injectable 12.5 Gram(s) IV Push once  dextrose 50% Injectable 25 Gram(s) IV Push once  dextrose 50% Injectable 25 Gram(s) IV Push once  furosemide   Injectable 80 milliGRAM(s) IV Push every 12 hours    MEDICATIONS  (PRN):  dextrose Gel 1 Dose(s) Oral once PRN Blood Glucose LESS THAN 70 milliGRAM(s)/deciLiter  glucagon  Injectable 1 milliGRAM(s) IntraMuscular once PRN Glucose <70 milliGRAM(s)/deciLiter      Vital Signs Last 24 Hrs  T(C): 36.8 (07-03-17 @ 12:32), Max: 37.2 (07-03-17 @ 00:26)  HR: 71 (07-03-17 @ 12:32) (71 - 87)  BP: 161/66 (07-03-17 @ 12:32) (151/68 - 183/73)  RR: 18 (07-03-17 @ 12:32) (17 - 18)  SpO2: 98% (07-03-17 @ 12:32) (98% - 100%)  CAPILLARY BLOOD GLUCOSE  112 (03 Jul 2017 12:32)  90 (03 Jul 2017 08:43)  294 (02 Jul 2017 21:45)  252 (02 Jul 2017 18:13)        I&O's Summary    02 Jul 2017 07:01  -  03 Jul 2017 07:00  --------------------------------------------------------  IN: 440 mL / OUT: 2200 mL / NET: -1760 mL    03 Jul 2017 07:01  -  03 Jul 2017 15:00  --------------------------------------------------------  IN: 120 mL / OUT: 0 mL / NET: 120 mL        PHYSICAL EXAM:  GENERAL: NAD, well-developed  HEAD:  Atraumatic, Normocephalic  EYES: EOMI, PERRLA, conjunctiva and sclera clear  NECK: Supple, No JVD  CHEST/LUNG: Clear to auscultation bilaterally; No wheeze  HEART: Regular rate and rhythm; No murmurs, rubs, or gallops  ABDOMEN: Soft, Nontender, Nondistended; Bowel sounds present  EXTREMITIES:  2+ Peripheral Pulses, No clubbing, cyanosis, 1+ edema left leg.  PSYCH: AAOx3  NEUROLOGY: non-focal  SKIN: No rashes or lesions    LABS:                        8.0    4.7   )-----------( 214      ( 03 Jul 2017 06:37 )             23.7     07-03    139  |  96  |  23  ----------------------------<  145<H>  4.7   |  29  |  4.22<H>    Ca    8.0<L>      03 Jul 2017 10:03  Phos  3.1     07-03  Mg     2.1     07-03        CARDIAC MARKERS ( 03 Jul 2017 10:03 )  x     / 0.12 ng/mL / 202 U/L / x     / 3.7 ng/mL  CARDIAC MARKERS ( 02 Jul 2017 21:07 )  x     / 0.10 ng/mL / 281 U/L / x     / 5.2 ng/mL          RADIOLOGY & ADDITIONAL TESTS:    Imaging Personally Reviewed:  < from: CT Angio Chest w/ IV Cont (07.02.17 @ 21:25) >    EXAM:  CT ANGIO CHEST (W)AW IC                            PROCEDURE DATE:  07/02/2017            INTERPRETATION:  EXAMINATION: CT ANGIO CHEST (W)AW IC    CLINICAL INDICATION: Moderate leg swelling and shortness of breath.    TECHNIQUE: CTA of the chest was obtained after the administration of 90   ml of isovue 350, 10 ml was discarded.  MIP Images were reconstructed.    COMPARISON: CT angiogram of the chest on 1/11/2017. CT chest 9/2/2016,   6/7/2016, 6/1/2016 4/6/2015.    FINDINGS:     AIRWAYS, LUNGS AND PLEURA: The central tracheobronchial tree is patent.   Bilateral interlobular septal thickening peripherally. Small bilateral   pleural effusions with adjacent atelectasis. Several scattered   thin-walled pulmonary cysts.  3 mm nodule leftupper lobe series 2 image 32.  2 mm nodule left upper lobe series 2 image 35. Small focus of peripheral   tree-in-bud type opacity.  6 mm groundglass nodule left upper lobe series 2 image 45.  11 mm right upper lobe groundglass nodule series 2 image 30. This has   been progressively increasing in size and density since 2015. Neoplasm is   not excluded.    MEDIASTINUM : There are several prominent mediastinal lymph nodes   particularly in the paratracheal and subcarinal spaces. The visualized   portion of the thyroid gland is unremarkable.     HEART AND VESSELS: The heart is normal in size.  There are   atherosclerotic calcifications of the aorta and coronary arteries.  There   is no pericardial effusion.  Aortic valvular calcifications. Adequate   opacification of pulmonary arterial tree. No pulmonary embolism.    UPPER ABDOMEN: Images of the upper abdomen demonstrate an atrophic left   kidney. Significant atherosclerotic disease.    BONES AND SOFT TISSUES: Osseous heterogeneity, compatible with renal   osteodystrophy.  There is a 1.1 cm subcutaneous nodule in the left   inferior axilla (series 2, image 43) of uncertain etiology.    IMPRESSION:   No pulmonary embolism.    Mild pulmonary edema with small bilateral pleural effusions.    Scattered small pulmonary nodules. Recommend follow-up in 3 months. 11 mm   right upper lobe groundglass nodule series 2 image 30. This has been   progressively increasing in size and density since 2015. Neoplasm is not   excluded. Recommend pulmonary consultation.    < end of copied text >    Consultant(s) Notes Reviewed:      Care Discussed with Consultants/Other Providers:

## 2017-07-03 NOTE — PROGRESS NOTE ADULT - PROBLEM SELECTOR PLAN 4
Pt with LLE edema.  Based on recent duplex exam may be related to stenosis of vein grafts and graft thrombus.  Check LLE duplex  Vascular consult noted

## 2017-07-03 NOTE — PROGRESS NOTE ADULT - ASSESSMENT
62yo F w/pmhx of DM2, CAD, PAD, HTN, HLE who now presents with LLE edema/swelling, found to have severe hyperglycemia, hyperkalemia, pulmonary edema.

## 2017-07-03 NOTE — PROGRESS NOTE ADULT - ASSESSMENT
61 year old female with ESRD- on HD, PAD, D CHF admitted with Acute/Chronic Diastolic CHF/  LLE edema/pain     1. cv stable no chest pain or sob    2. Acute on chronic D CHF  SOB improving  + fluid overload  fluid removal with IV lasix/ HD   CT angio : negative PE/ + pulm edema/ + pulm nodules  med f/u / pulm eval?    3 LLE pain   recent  left lower extremity balloon angioplasty  vascular f.u   f/u US bl LE   dvt ppx 61 year old female with ESRD- on HD, PAD, D CHF admitted with Acute/Chronic Diastolic CHF/  LLE edema/pain     1. cv stable no chest pain or sob  8 beats no sustained Vtach  on tele , asymptomatic   continue to monitor BMP   continue with ASA/  plavix     2. Acute on chronic D CHF  SOB improving  + fluid overload  fluid removal with IV lasix/ HD   CT angio : negative PE/ + pulm edema/ + pulm nodules  med f/u / pulm eval?    3 LLE pain   recent  left lower extremity balloon angioplasty  vascular f.u   f/u US bl LE   dvt ppx     4. htn : elevated in am prior to AM bp meds   continue to trend   continue with current antihtn meds

## 2017-07-03 NOTE — PROGRESS NOTE ADULT - SUBJECTIVE AND OBJECTIVE BOX
[facility]   [phone]  RENAL FOLLOW UP NOTE  --------------------------------------------------------------------------------  Chief Complaint:    24 hour events/subjective:    c/o left leg pain. breathing is better today however, still sob. no cp or palp    PAST HISTORY  --------------------------------------------------------------------------------  No significant changes to PMH, PSH, FHx, SHx, unless otherwise noted    ALLERGIES & MEDICATIONS  --------------------------------------------------------------------------------  Allergies    No Known Allergies    Intolerances      Standing Inpatient Medications  aspirin enteric coated 81 milliGRAM(s) Oral daily  lisinopril 40 milliGRAM(s) Oral daily  DULoxetine 60 milliGRAM(s) Oral daily  atorvastatin 20 milliGRAM(s) Oral at bedtime  clopidogrel Tablet 75 milliGRAM(s) Oral at bedtime  metoprolol succinate ER 50 milliGRAM(s) Oral daily  cinacalcet 60 milliGRAM(s) Oral daily  sevelamer hydrochloride 2400 milliGRAM(s) Oral three times a day with meals  hydrALAZINE 100 milliGRAM(s) Oral every 8 hours  multivitamin 1 Tablet(s) Oral daily  heparin  Injectable 5000 Unit(s) SubCutaneous every 12 hours  insulin lispro (HumaLOG) Pump 1 Each SubCutaneous Continuous Pump  dextrose 5%. 1000 milliLiter(s) IV Continuous <Continuous>  dextrose 50% Injectable 12.5 Gram(s) IV Push once  dextrose 50% Injectable 25 Gram(s) IV Push once  dextrose 50% Injectable 25 Gram(s) IV Push once  furosemide   Injectable 80 milliGRAM(s) IV Push every 12 hours    PRN Inpatient Medications  dextrose Gel 1 Dose(s) Oral once PRN  glucagon  Injectable 1 milliGRAM(s) IntraMuscular once PRN      REVIEW OF SYSTEMS  --------------------------------------------------------------------------------    Gen: +fatigue, - fevers/chills,  CVS: denies chest pain/palpitations  Resp: +SOB/Cough  GI: Denies N/V/Abd pain  All other systems were reviewed and are negative, except as noted.    VITALS/PHYSICAL EXAM  --------------------------------------------------------------------------------  T(C): 36.8 (07-03-17 @ 12:32), Max: 37.2 (07-03-17 @ 00:26)  HR: 71 (07-03-17 @ 12:32) (71 - 87)  BP: 161/66 (07-03-17 @ 12:32) (151/68 - 183/73)  RR: 18 (07-03-17 @ 12:32) (17 - 18)  SpO2: 98% (07-03-17 @ 12:32) (98% - 100%)  Wt(kg): --        07-02-17 @ 07:01  -  07-03-17 @ 07:00  --------------------------------------------------------  IN: 440 mL / OUT: 2200 mL / NET: -1760 mL    07-03-17 @ 07:01  -  07-03-17 @ 14:38  --------------------------------------------------------  IN: 120 mL / OUT: 0 mL / NET: 120 mL      Physical Exam:  	    	Gen: alert oriented place person and date   	Pulm: Decreased breath sounds b/l bases. + left base  rales or ronchi or wheezing  	CV: RRR, S1/S2. no rub  	Back: No CVA tenderness; no sacral edema  	Abd: +BS, soft, nontender/nondistended  	: No suprapubic tenderness.               Extremity: 2+  LLE edema no clubbing  	  LABS/STUDIES  --------------------------------------------------------------------------------              8.0    4.7   >-----------<  214      [07-03-17 @ 06:37]              23.7     139  |  96  |  23  ----------------------------<  145      [07-03-17 @ 10:03]  4.7   |  29  |  4.22        Ca     8.0     [07-03-17 @ 10:03]      Mg     2.1     [07-03-17 @ 10:03]      Phos  3.1     [07-03-17 @ 10:03]          Troponin 0.12      [07-03-17 @ 10:03]        [07-03-17 @ 10:03]    Creatinine Trend:  SCr 4.22 [07-03 @ 10:03]  SCr 4.55 [07-02 @ 06:53]  SCr 7.38 [07-01 @ 07:21]  SCr 7.02 [07-01 @ 01:43]  SCr 6.81 [06-30 @ 22:23]              Urinalysis - [06-04-17 @ 08:24]      Color Yellow / Appearance Clear / SG 1.013 / pH 8.5      Gluc 1000 / Ketone Negative  / Bili Negative / Urobili Negative       Blood Negative / Protein >600 / Leuk Est Small / Nitrite Negative      RBC 3-5 / WBC 6-10 / Hyaline  / Gran  / Sq Epi  / Non Sq Epi OCC / Bacteria       HbA1c 7.3      [06-16-17 @ 04:45]

## 2017-07-04 LAB
ANION GAP SERPL CALC-SCNC: 17 MMOL/L — SIGNIFICANT CHANGE UP (ref 5–17)
BUN SERPL-MCNC: 38 MG/DL — HIGH (ref 7–23)
CALCIUM SERPL-MCNC: 8.2 MG/DL — LOW (ref 8.4–10.5)
CHLORIDE SERPL-SCNC: 96 MMOL/L — SIGNIFICANT CHANGE UP (ref 96–108)
CO2 SERPL-SCNC: 26 MMOL/L — SIGNIFICANT CHANGE UP (ref 22–31)
CREAT SERPL-MCNC: 6.36 MG/DL — HIGH (ref 0.5–1.3)
GLUCOSE SERPL-MCNC: 128 MG/DL — HIGH (ref 70–99)
POTASSIUM SERPL-MCNC: 5 MMOL/L — SIGNIFICANT CHANGE UP (ref 3.5–5.3)
POTASSIUM SERPL-SCNC: 5 MMOL/L — SIGNIFICANT CHANGE UP (ref 3.5–5.3)
SODIUM SERPL-SCNC: 139 MMOL/L — SIGNIFICANT CHANGE UP (ref 135–145)

## 2017-07-04 PROCEDURE — 99232 SBSQ HOSP IP/OBS MODERATE 35: CPT

## 2017-07-04 PROCEDURE — 93925 LOWER EXTREMITY STUDY: CPT | Mod: 26

## 2017-07-04 PROCEDURE — 93970 EXTREMITY STUDY: CPT | Mod: 26

## 2017-07-04 RX ADMIN — LISINOPRIL 40 MILLIGRAM(S): 2.5 TABLET ORAL at 20:24

## 2017-07-04 RX ADMIN — Medication 1 TABLET(S): at 11:56

## 2017-07-04 RX ADMIN — Medication 50 MILLIGRAM(S): at 17:41

## 2017-07-04 RX ADMIN — CLOPIDOGREL BISULFATE 75 MILLIGRAM(S): 75 TABLET, FILM COATED ORAL at 21:13

## 2017-07-04 RX ADMIN — SEVELAMER CARBONATE 2400 MILLIGRAM(S): 2400 POWDER, FOR SUSPENSION ORAL at 18:29

## 2017-07-04 RX ADMIN — SEVELAMER CARBONATE 2400 MILLIGRAM(S): 2400 POWDER, FOR SUSPENSION ORAL at 09:37

## 2017-07-04 RX ADMIN — Medication 81 MILLIGRAM(S): at 11:56

## 2017-07-04 RX ADMIN — CINACALCET 60 MILLIGRAM(S): 30 TABLET, FILM COATED ORAL at 11:56

## 2017-07-04 RX ADMIN — Medication 80 MILLIGRAM(S): at 21:19

## 2017-07-04 RX ADMIN — Medication 100 MILLIGRAM(S): at 21:13

## 2017-07-04 RX ADMIN — DULOXETINE HYDROCHLORIDE 60 MILLIGRAM(S): 30 CAPSULE, DELAYED RELEASE ORAL at 11:56

## 2017-07-04 RX ADMIN — OXYCODONE HYDROCHLORIDE 5 MILLIGRAM(S): 5 TABLET ORAL at 00:10

## 2017-07-04 RX ADMIN — Medication 80 MILLIGRAM(S): at 09:48

## 2017-07-04 RX ADMIN — Medication 100 MILLIGRAM(S): at 05:52

## 2017-07-04 RX ADMIN — SEVELAMER CARBONATE 2400 MILLIGRAM(S): 2400 POWDER, FOR SUSPENSION ORAL at 11:56

## 2017-07-04 RX ADMIN — ATORVASTATIN CALCIUM 20 MILLIGRAM(S): 80 TABLET, FILM COATED ORAL at 21:13

## 2017-07-04 NOTE — PROGRESS NOTE ADULT - PROBLEM SELECTOR PLAN 1
Recommend continue insulin pump. Yesterday FS's runnin low as she had decreased appetite. It is improved today and FS 's running b/w 200-300's. Will make slight change in basal rate-will increase basal rate b/w 12am - 5am by roughly 10% (0.3->0.325).  Cont. to monitor FS's TIDAC/qhs.  Back up insulin doses are Lantus 10u QD and Humalog 4-6-6u TID AC and low SSI AC and HS.    D/w attending    Carolina Nixon DO  Endocrine Fellow   Pager: [phone]. Recommend continue insulin pump. Yesterday FS's runnin low as she had decreased appetite. It is improved today and FS 's running b/w 200-300's. Will make slight change in basal rate-will increase basal rate b/w 12am - 5am by roughly 10% (0.3->0.325).  Cont. to monitor FS's TIDAC/qhs.  Back up insulin doses are Lantus 10u QD and Humalog 4-6-6u TID AC and low SSI AC and HS.        Endocrine Fellow   Pager: [phone]. Recommend continue insulin pump. Yesterday FS's runnin low as she had decreased appetite. It is improved today and FS 's running b/w 200-300's. Will make slight change in basal rate-will increase basal rate b/w 12am - 5am by roughly 10% (0.3->0.325).  Cont. to monitor FS's TIDAC/qhs.  Back up insulin doses are Lantus 10u QD and Humalog 4-6-6u TID AC and low SSI AC and HS.

## 2017-07-04 NOTE — PROGRESS NOTE ADULT - ASSESSMENT
62 y/o woman with poorly controlled brittle T1DM with PAD s/p stent, ESRD on HD, retinopathy here with left leg edema and pain.

## 2017-07-04 NOTE — PROGRESS NOTE ADULT - PROBLEM SELECTOR PROBLEM 1
ESRD (end stage renal disease) on dialysis
ESRD (end stage renal disease) on dialysis
Hyperkalemia
Hyperkalemia
Uncontrolled type 1 diabetes mellitus with other circulatory complication

## 2017-07-04 NOTE — PROVIDER CONTACT NOTE (OTHER) - REASON
8 beats of WCT for the first time
Patient with elevated BP post dialysis
Patient with elevated BP; pending dialysis
Pt's fingerstick 52, repeat 54.

## 2017-07-04 NOTE — PROGRESS NOTE ADULT - ATTENDING COMMENTS
Her pain and swelling have improved. There was a moderate stenosis in the proximal aspect of the bypass, it needs no treatment now. It will be followed closely.
Patient seen and examined.  Agree with above NP note.    cv stable   continued volume removal with lasix/HD  cont bb  cont antiplatelets
Pierce Viera MD pager [phone]
Pierce Viera MD pager [phone]

## 2017-07-04 NOTE — PROGRESS NOTE ADULT - ASSESSMENT
61 year old female with ESRD- on HD, PAD, D CHF admitted with Acute/Chronic Diastolic CHF/  LLE edema/pain     1. cv stable no chest pain or inc sob  continue with ASA/  plavix     2. Acute on chronic D CHF  SOB improving  still volume overloaded  fluid removal with IV lasix/ HD     LLE pain   recent  left lower extremity balloon angioplasty  vascular f.u   f/u US bl LE   dvt ppx     4. htn : cont current meds elevated in am prior to AM bp meds   continue with current antihtn meds, consider adding norvasc if sbp remains elevated

## 2017-07-04 NOTE — PROGRESS NOTE ADULT - SUBJECTIVE AND OBJECTIVE BOX
VASCULAR SURGERY NOTE    07-04-17 @ 10:46  Patient seen and examined. Reports swelling of RLE improved.    PAST MEDICAL HISTORY:  Subclavian vein stenosis, left  Cellulitis  DKA, type 1  ACS (acute coronary syndrome)  MI (myocardial infarction)  MI, old  TIA (transient ischemic attack)  PAD (peripheral artery disease)  HLD (hyperlipidemia)  HTN (hypertension)  ESRD (end stage renal disease) on dialysis  Type 1 diabetes mellitus  CVA (cerebral vascular accident)  CAD (coronary artery disease)  Myocardial infarct  Murmur, cardiac  Gout  CVA (cerebral infarction)  Peripheral vascular disease  Coronary artery disease  Diabetes mellitus type 1  ESRD (end stage renal disease)  TIA (transient ischemic attack)  x 3 2005 2 nt to VSD/ASD repair  Diabetes mellitus type II  HTN (hypertension), benign  Hyperlipidemia      PAST SURGICAL HISTORY:  Status post device closure of ASD  History of cardiac catheterization  S/P cholecystectomy  AV fistula  S/P femoral-popliteal bypass surgery  S/P femoral-popliteal bypass surgery  Stented coronary artery  AV (arteriovenous fistula)  S/P cholecystectomy  ASD OR VSD  Repair 2005      ALLERGIES:  No Known Allergies      MEDICATIONS:  MEDICATIONS  (STANDING):  aspirin enteric coated 81 milliGRAM(s) Oral daily  lisinopril 40 milliGRAM(s) Oral daily  DULoxetine 60 milliGRAM(s) Oral daily  atorvastatin 20 milliGRAM(s) Oral at bedtime  clopidogrel Tablet 75 milliGRAM(s) Oral at bedtime  metoprolol succinate ER 50 milliGRAM(s) Oral daily  cinacalcet 60 milliGRAM(s) Oral daily  sevelamer hydrochloride 2400 milliGRAM(s) Oral three times a day with meals  hydrALAZINE 100 milliGRAM(s) Oral every 8 hours  multivitamin 1 Tablet(s) Oral daily  heparin  Injectable 5000 Unit(s) SubCutaneous every 12 hours  insulin lispro (HumaLOG) Pump 1 Each SubCutaneous Continuous Pump  dextrose 5%. 1000 milliLiter(s) (50 mL/Hr) IV Continuous <Continuous>  dextrose 50% Injectable 12.5 Gram(s) IV Push once  dextrose 50% Injectable 25 Gram(s) IV Push once  dextrose 50% Injectable 25 Gram(s) IV Push once  furosemide   Injectable 80 milliGRAM(s) IV Push every 12 hours    MEDICATIONS  (PRN):  dextrose Gel 1 Dose(s) Oral once PRN Blood Glucose LESS THAN 70 milliGRAM(s)/deciLiter  glucagon  Injectable 1 milliGRAM(s) IntraMuscular once PRN Glucose <70 milliGRAM(s)/deciLiter      PHYSCIAL EXAM  Vital Signs Last 24 Hrs  T(C): 36.7 (04 Jul 2017 04:15), Max: 36.8 (03 Jul 2017 12:32)  T(F): 98 (04 Jul 2017 04:15), Max: 98.3 (03 Jul 2017 23:40)  HR: 80 (04 Jul 2017 10:22) (71 - 84)  BP: 162/62 (04 Jul 2017 10:23) (161/66 - 190/74)  BP(mean): --  RR: 18 (04 Jul 2017 04:15) (17 - 18)  SpO2: 99% (04 Jul 2017 04:15) (96% - 100%)    I&O's Summary    03 Jul 2017 07:01  -  04 Jul 2017 07:00  --------------------------------------------------------  IN: 600 mL / OUT: 100 mL / NET: 500 mL        General: NAD, resting comfortably  Vascular:           RLE - Warm. No erythema or edema. No ulceration. Sensation and motor function intact. DP and PT pulses palpable.           LLE - Warm, sensate, motor function intact. Mild edema, no erythema.     LABS:                        8.0    4.7   )-----------( 214      ( 03 Jul 2017 06:37 )             23.7     07-03    139  |  96  |  23  ----------------------------<  145<H>  4.7   |  29  |  4.22<H>    Ca    8.0<L>      03 Jul 2017 10:03  Phos  3.1     07-03  Mg     2.1     07-03      Lactate:      ABG - ( 02 Jul 2017 15:27 )  pH: 7.42  /  pCO2: 41    /  pO2: 120   / HCO3: 26    / Base Excess: 2.3   /  SaO2: 99                CARDIAC MARKERS ( 03 Jul 2017 10:03 )  x     / 0.12 ng/mL / 202 U/L / x     / 3.7 ng/mL  CARDIAC MARKERS ( 02 Jul 2017 21:07 )  x     / 0.10 ng/mL / 281 U/L / x     / 5.2 ng/mL              IMAGING:      ASSESSMENT and PLAN:  61y Female      Discussed with VASCULAR SURGERY NOTE    07-04-17 @ 10:46  Patient seen and examined. Reports swelling of RLE improved.    PAST MEDICAL HISTORY:  Subclavian vein stenosis, left  Cellulitis  DKA, type 1  ACS (acute coronary syndrome)  MI (myocardial infarction)  MI, old  TIA (transient ischemic attack)  PAD (peripheral artery disease)  HLD (hyperlipidemia)  HTN (hypertension)  ESRD (end stage renal disease) on dialysis  Type 1 diabetes mellitus  CVA (cerebral vascular accident)  CAD (coronary artery disease)  Myocardial infarct  Murmur, cardiac  Gout  CVA (cerebral infarction)  Peripheral vascular disease  Coronary artery disease  Diabetes mellitus type 1  ESRD (end stage renal disease)  TIA (transient ischemic attack)  x 3 2005 2 nt to VSD/ASD repair  Diabetes mellitus type II  HTN (hypertension), benign  Hyperlipidemia      PAST SURGICAL HISTORY:  Status post device closure of ASD  History of cardiac catheterization  S/P cholecystectomy  AV fistula  S/P femoral-popliteal bypass surgery  S/P femoral-popliteal bypass surgery  Stented coronary artery  AV (arteriovenous fistula)  S/P cholecystectomy  ASD OR VSD  Repair 2005      ALLERGIES:  No Known Allergies      MEDICATIONS:  MEDICATIONS  (STANDING):  aspirin enteric coated 81 milliGRAM(s) Oral daily  lisinopril 40 milliGRAM(s) Oral daily  DULoxetine 60 milliGRAM(s) Oral daily  atorvastatin 20 milliGRAM(s) Oral at bedtime  clopidogrel Tablet 75 milliGRAM(s) Oral at bedtime  metoprolol succinate ER 50 milliGRAM(s) Oral daily  cinacalcet 60 milliGRAM(s) Oral daily  sevelamer hydrochloride 2400 milliGRAM(s) Oral three times a day with meals  hydrALAZINE 100 milliGRAM(s) Oral every 8 hours  multivitamin 1 Tablet(s) Oral daily  heparin  Injectable 5000 Unit(s) SubCutaneous every 12 hours  insulin lispro (HumaLOG) Pump 1 Each SubCutaneous Continuous Pump  dextrose 5%. 1000 milliLiter(s) (50 mL/Hr) IV Continuous <Continuous>  dextrose 50% Injectable 12.5 Gram(s) IV Push once  dextrose 50% Injectable 25 Gram(s) IV Push once  dextrose 50% Injectable 25 Gram(s) IV Push once  furosemide   Injectable 80 milliGRAM(s) IV Push every 12 hours    MEDICATIONS  (PRN):  dextrose Gel 1 Dose(s) Oral once PRN Blood Glucose LESS THAN 70 milliGRAM(s)/deciLiter  glucagon  Injectable 1 milliGRAM(s) IntraMuscular once PRN Glucose <70 milliGRAM(s)/deciLiter      PHYSCIAL EXAM  Vital Signs Last 24 Hrs  T(C): 36.7 (04 Jul 2017 04:15), Max: 36.8 (03 Jul 2017 12:32)  T(F): 98 (04 Jul 2017 04:15), Max: 98.3 (03 Jul 2017 23:40)  HR: 80 (04 Jul 2017 10:22) (71 - 84)  BP: 162/62 (04 Jul 2017 10:23) (161/66 - 190/74)  BP(mean): --  RR: 18 (04 Jul 2017 04:15) (17 - 18)  SpO2: 99% (04 Jul 2017 04:15) (96% - 100%)    I&O's Summary    03 Jul 2017 07:01  -  04 Jul 2017 07:00  --------------------------------------------------------  IN: 600 mL / OUT: 100 mL / NET: 500 mL        General: NAD, resting comfortably  Vascular:           RLE - Warm. No erythema or edema. No ulceration. Sensation and motor function intact. DP and PT pulses palpable.           LLE - Warm, sensate, motor function intact. Mild edema, no erythema.     LABS:                        8.0    4.7   )-----------( 214      ( 03 Jul 2017 06:37 )             23.7     07-03    139  |  96  |  23  ----------------------------<  145<H>  4.7   |  29  |  4.22<H>    Ca    8.0<L>      03 Jul 2017 10:03  Phos  3.1     07-03  Mg     2.1     07-03      Lactate:      ABG - ( 02 Jul 2017 15:27 )  pH: 7.42  /  pCO2: 41    /  pO2: 120   / HCO3: 26    / Base Excess: 2.3   /  SaO2: 99                CARDIAC MARKERS ( 03 Jul 2017 10:03 )  x     / 0.12 ng/mL / 202 U/L / x     / 3.7 ng/mL  CARDIAC MARKERS ( 02 Jul 2017 21:07 )  x     / 0.10 ng/mL / 281 U/L / x     / 5.2 ng/mL      ASSESSMENT and PLAN:  61y Female, h/o L fem-pop bypass, with LLE swelling. Duplex US studies of extremity have been performed, will follow up results.      Discussed with Dr. Rizvi

## 2017-07-04 NOTE — PROVIDER CONTACT NOTE (OTHER) - ACTION/TREATMENT ORDERED:
Follow protocol until fingerstick WDL
 and  Eliel ordered to AM labs,will continue to monitor
NP made aware; NP stated to administer 80mg lasix as ordered.
NP stated to administer 50 mg metoprolol now.

## 2017-07-04 NOTE — PROGRESS NOTE ADULT - SUBJECTIVE AND OBJECTIVE BOX
[facility]   [phone]  DIALYSIS NOTE  Chief Complaint: ESRD/Ongoing hemodialysis requirement.   24 hour events/subjective:    states breathing is better.   is on O2 no cp       ALLERGIES & MEDICATIONS  --------------------------------------------------------------------------------  Allergies    No Known Allergies    Intolerances      Standing Inpatient Medications  aspirin enteric coated 81 milliGRAM(s) Oral daily  lisinopril 40 milliGRAM(s) Oral daily  DULoxetine 60 milliGRAM(s) Oral daily  atorvastatin 20 milliGRAM(s) Oral at bedtime  clopidogrel Tablet 75 milliGRAM(s) Oral at bedtime  metoprolol succinate ER 50 milliGRAM(s) Oral daily  cinacalcet 60 milliGRAM(s) Oral daily  sevelamer hydrochloride 2400 milliGRAM(s) Oral three times a day with meals  hydrALAZINE 100 milliGRAM(s) Oral every 8 hours  multivitamin 1 Tablet(s) Oral daily  heparin  Injectable 5000 Unit(s) SubCutaneous every 12 hours  insulin lispro (HumaLOG) Pump 1 Each SubCutaneous Continuous Pump  dextrose 5%. 1000 milliLiter(s) IV Continuous <Continuous>  dextrose 50% Injectable 12.5 Gram(s) IV Push once  dextrose 50% Injectable 25 Gram(s) IV Push once  dextrose 50% Injectable 25 Gram(s) IV Push once  furosemide   Injectable 80 milliGRAM(s) IV Push every 12 hours    PRN Inpatient Medications  dextrose Gel 1 Dose(s) Oral once PRN  glucagon  Injectable 1 milliGRAM(s) IntraMuscular once PRN      REVIEW OF SYSTEMS  --------------------------------------------------------------------------------  no itching or rash  no fever or chill  no cp or palp   no sob or cough   no N/V/D/ no abd pain   ext no edema no pain         VITALS/PHYSICAL EXAM  --------------------------------------------------------------------------------  T(C): 36.7 (07-04-17 @ 04:15), Max: 36.8 (07-03-17 @ 12:32)  HR: 80 (07-04-17 @ 10:22) (71 - 84)  BP: 162/62 (07-04-17 @ 10:23) (161/66 - 190/74)  RR: 18 (07-04-17 @ 04:15) (17 - 18)  SpO2: 99% (07-04-17 @ 04:15) (96% - 100%)  Wt(kg): --        07-03-17 @ 07:01  -  07-04-17 @ 07:00  --------------------------------------------------------  IN: 600 mL / OUT: 100 mL / NET: 500 mL    07-04-17 @ 07:01  -  07-04-17 @ 12:22  --------------------------------------------------------  IN: 340 mL / OUT: 0 mL / NET: 340 mL      Physical Exam:  		    	Gen: alert oriented place person and date   	Pulm: Decreased breath sounds b/l bases with left 1/3 rales  rales no ronchi  	CV: RRR, S1/S2  	Abd: +BS, soft, nontender/nondistended  	Extremity: No cyanosis, LLE non pitting edema no clubbing  		avf + bruit  LABS/STUDIES  --------------------------------------------------------------------------------              8.0    4.7   >-----------<  214      [07-03-17 @ 06:37]              23.7     139  |  96  |  23  ----------------------------<  145      [07-03-17 @ 10:03]  4.7   |  29  |  4.22        Ca     8.0     [07-03-17 @ 10:03]      Mg     2.1     [07-03-17 @ 10:03]      Phos  3.1     [07-03-17 @ 10:03]          Troponin 0.12      [07-03-17 @ 10:03]        [07-03-17 @ 10:03]            imp/suggest: ESRD      Hemodialysis Prescription:  	Access: avf  	Dialyzer: revaclear   	Blood Flow (mL/Min): 400  	Dialysate Flow (mL/Min): 600  	Target UF (Liters):2.5 liter  	Treatment Time:210 min  	Potassium: 2k   	Calcium: 2.5

## 2017-07-04 NOTE — PROGRESS NOTE ADULT - ASSESSMENT
62yo F w/pmhx of DM2, CAD, PAD, HTN, HLE who now presents with LLE edema/swelling, found to have severe hyperglycemia, hyperkalemia, pulmonary edema.  Hyperkalemia resolved -continue HD for ESRD  Diabetes type 1- endocrine follow  Lung nodules-pulmonary follow  PVD- vascular follow/LED pending  Pierce Viera MD pager [phone]

## 2017-07-04 NOTE — PROGRESS NOTE ADULT - SUBJECTIVE AND OBJECTIVE BOX
DM1 Uncontrolled.    History: 62 y/o woman with poorly controlled T1DM with multiple complications and comorbid medical conditions. LLE edema & pain has significantly improved. She is eating her meals well. No c/o N/V. Due for site change today, has supplies.    MEDICATIONS  (STANDING):  aspirin enteric coated 81 milliGRAM(s) Oral daily  lisinopril 40 milliGRAM(s) Oral daily  DULoxetine 60 milliGRAM(s) Oral daily  atorvastatin 20 milliGRAM(s) Oral at bedtime  clopidogrel Tablet 75 milliGRAM(s) Oral at bedtime  metoprolol succinate ER 50 milliGRAM(s) Oral daily  cinacalcet 60 milliGRAM(s) Oral daily  sevelamer hydrochloride 2400 milliGRAM(s) Oral three times a day with meals  hydrALAZINE 100 milliGRAM(s) Oral every 8 hours  multivitamin 1 Tablet(s) Oral daily  heparin  Injectable 5000 Unit(s) SubCutaneous every 12 hours  insulin lispro (HumaLOG) Pump 1 Each SubCutaneous Continuous Pump  dextrose 5%. 1000 milliLiter(s) (50 mL/Hr) IV Continuous <Continuous>  dextrose 50% Injectable 12.5 Gram(s) IV Push once  dextrose 50% Injectable 25 Gram(s) IV Push once  dextrose 50% Injectable 25 Gram(s) IV Push once  furosemide   Injectable 80 milliGRAM(s) IV Push every 12 hours    MEDICATIONS  (PRN):  dextrose Gel 1 Dose(s) Oral once PRN Blood Glucose LESS THAN 70 milliGRAM(s)/deciLiter  glucagon  Injectable 1 milliGRAM(s) IntraMuscular once PRN Glucose <70 milliGRAM(s)/deciLiter      Allergies    No Known Allergies    Intolerances      Review of Systems:  Constitutional: No fever  Eyes: No blurry vision  Neuro: No tremors  HEENT: No pain  Cardiovascular: No chest pain, palpitations  Respiratory: No SOB, no cough  GI: No nausea, vomiting, abdominal pain  : No dysuria  Skin: no rash  Psych: no depression  Endocrine: no polyuria, polydipsia  Hem/lymph: no swelling  Osteoporosis: no fractures    ALL OTHER SYSTEMS REVIEWED AND NEGATIVE    UNABLE TO OBTAIN    PHYSICAL EXAM:  VITALS: T(C): 36.7 (07-04-17 @ 04:15)  T(F): 98 (07-04-17 @ 04:15), Max: 98.3 (07-03-17 @ 23:40)  HR: 80 (07-04-17 @ 10:22) (71 - 84)  BP: 162/62 (07-04-17 @ 10:23) (161/66 - 190/74)  RR:  (17 - 18)  SpO2:  (96% - 100%)  Wt(kg): --  GENERAL: NAD, well-groomed  EYES: No proptosis  HEENT:  Atraumatic, Normocephalic  THYROID: Normal size, no palpable nodules  RESPIRATORY: Clear to auscultation bilaterally; No rales, rhonchi, wheezing  CARDIOVASCULAR: Regular rate and rhythm; No murmurs; no peripheral edema  GI: Soft, nontender, non distended  SKIN: Dry  MUSCULOSKELETAL: Full range of motion\    CAPILLARY BLOOD GLUCOSE  237 (07-04 @ 11:47)  301 (07-04 @ 09:31)  209 (07-03 @ 21:08)  121 (07-03 @ 19:01)  76 (07-03 @ 18:39)  54 (07-03 @ 18:15)  52 (07-03 @ 18:14)  112 (07-03 @ 12:32)  90 (07-03 @ 08:43)  294 (07-02 @ 21:45)  252 (07-02 @ 18:13)  180 (07-02 @ 13:01)  306 (07-02 @ 08:47)  266 (07-01 @ 21:42)  98 (07-01 @ 17:51)  364 (07-01 @ 12:32)      07-03    139  |  96  |  23  ----------------------------<  145<H>  4.7   |  29  |  4.22<H>    EGFR if : 13<L>  EGFR if non : 11<L>    Ca    8.0<L>      07-03  Mg     2.1     07-03  Phos  3.1     07-03            Thyroid Function Tests:      Hemoglobin A1C, Whole Blood: 7.3 % <H> [4.0 - 5.6] (06-16-17 @ 04:45)  Hemoglobin A1C, Whole Blood: 8.5 % <H> [4.0 - 5.6] (04-11-17 @ 07:12)

## 2017-07-04 NOTE — PROVIDER CONTACT NOTE (OTHER) - BACKGROUND
Pt Type 1 diabetic.
Patient admitted for T1 DM
Patient admitted type I diabetes with hyperglycemia
Patient admitted with hyperglycemia,Type 1 DM

## 2017-07-04 NOTE — PROVIDER CONTACT NOTE (OTHER) - ASSESSMENT
Pt asymptomatic.
Patient alert and oriented x4. Patient denies headache and is otherwise asymptomatic.  1649 vitals: HR = 76; BP = 177/53  1723 vitals: BP = 168/62 (manually auscultated)
Patient alert and oriented x4. Patient without complaint of headache.  0940 BP = 190/74 electronic  0942 BP = 172/72 manually auscultated
Patient asleep but easily arousable,denies palpitation,chest discomfort an chest pain.Vitals BP-165/67,heart rate 75/mt,O 2 sat 99% on 2 liters via nasal cannula,TEMP-98.6F.

## 2017-07-04 NOTE — PROGRESS NOTE ADULT - SUBJECTIVE AND OBJECTIVE BOX
CARDIOLOGY FOLLOW UP NOTE - DR. PORTILLO (for Dr. batres)    Subjective:    no inc sob, no cp    PHYSICAL EXAM:  T(C): 36.7 (07-04-17 @ 04:15), Max: 36.8 (07-03-17 @ 12:32)  HR: 80 (07-04-17 @ 10:22) (71 - 84)  BP: 162/62 (07-04-17 @ 10:23) (161/66 - 190/74)  RR: 18 (07-04-17 @ 04:15) (17 - 18)  SpO2: 99% (07-04-17 @ 04:15) (96% - 100%)  Wt(kg): --  I&O's Summary    03 Jul 2017 07:01  -  04 Jul 2017 07:00  --------------------------------------------------------  IN: 600 mL / OUT: 100 mL / NET: 500 mL        Appearance: Normal	  Cardiovascular: Normal S1 S2,RRR, No JVD, No murmurs  Respiratory: dec bs b/l bases, crackles b/l bases  Gastrointestinal:  Soft, Non-tender, + BS	  Extremities: Normal range of motion, No clubbing, cyanosis or edema    MEDICATIONS  (STANDING):  aspirin enteric coated 81 milliGRAM(s) Oral daily  lisinopril 40 milliGRAM(s) Oral daily  DULoxetine 60 milliGRAM(s) Oral daily  atorvastatin 20 milliGRAM(s) Oral at bedtime  clopidogrel Tablet 75 milliGRAM(s) Oral at bedtime  metoprolol succinate ER 50 milliGRAM(s) Oral daily  cinacalcet 60 milliGRAM(s) Oral daily  sevelamer hydrochloride 2400 milliGRAM(s) Oral three times a day with meals  hydrALAZINE 100 milliGRAM(s) Oral every 8 hours  multivitamin 1 Tablet(s) Oral daily  heparin  Injectable 5000 Unit(s) SubCutaneous every 12 hours  insulin lispro (HumaLOG) Pump 1 Each SubCutaneous Continuous Pump  dextrose 5%. 1000 milliLiter(s) (50 mL/Hr) IV Continuous <Continuous>  dextrose 50% Injectable 12.5 Gram(s) IV Push once  dextrose 50% Injectable 25 Gram(s) IV Push once  dextrose 50% Injectable 25 Gram(s) IV Push once  furosemide   Injectable 80 milliGRAM(s) IV Push every 12 hours      TELEMETRY: 	    ECG:  	  RADIOLOGY:   DIAGNOSTIC TESTING:  [ ] Echocardiogram:  [ ] Catheterization:  [ ] Stress Test:    OTHER: 	    LABS:	 	    CARDIAC MARKERS:                                8.0    4.7   )-----------( 214      ( 03 Jul 2017 06:37 )             23.7     07-03    139  |  96  |  23  ----------------------------<  145<H>  4.7   |  29  |  4.22<H>    Ca    8.0<L>      03 Jul 2017 10:03  Phos  3.1     07-03  Mg     2.1     07-03      proBNP:     Lipid Profile:   HgA1c:

## 2017-07-05 LAB
ANION GAP SERPL CALC-SCNC: 16 MMOL/L — SIGNIFICANT CHANGE UP (ref 5–17)
BUN SERPL-MCNC: 17 MG/DL — SIGNIFICANT CHANGE UP (ref 7–23)
CALCIUM SERPL-MCNC: 7.6 MG/DL — LOW (ref 8.4–10.5)
CHLORIDE SERPL-SCNC: 97 MMOL/L — SIGNIFICANT CHANGE UP (ref 96–108)
CO2 SERPL-SCNC: 25 MMOL/L — SIGNIFICANT CHANGE UP (ref 22–31)
CREAT SERPL-MCNC: 3.83 MG/DL — HIGH (ref 0.5–1.3)
GLUCOSE SERPL-MCNC: 192 MG/DL — HIGH (ref 70–99)
POTASSIUM SERPL-MCNC: 4.4 MMOL/L — SIGNIFICANT CHANGE UP (ref 3.5–5.3)
POTASSIUM SERPL-SCNC: 4.4 MMOL/L — SIGNIFICANT CHANGE UP (ref 3.5–5.3)
SODIUM SERPL-SCNC: 138 MMOL/L — SIGNIFICANT CHANGE UP (ref 135–145)

## 2017-07-05 RX ORDER — OXYCODONE HYDROCHLORIDE 5 MG/1
5 TABLET ORAL ONCE
Qty: 0 | Refills: 0 | Status: DISCONTINUED | OUTPATIENT
Start: 2017-07-05 | End: 2017-07-05

## 2017-07-05 RX ORDER — CALCIUM GLUCONATE 100 MG/ML
1 VIAL (ML) INTRAVENOUS ONCE
Qty: 1 | Refills: 0 | Status: DISCONTINUED | OUTPATIENT
Start: 2017-07-05 | End: 2017-07-05

## 2017-07-05 RX ORDER — OXYCODONE HYDROCHLORIDE 5 MG/1
5 TABLET ORAL EVERY 6 HOURS
Qty: 0 | Refills: 0 | Status: DISCONTINUED | OUTPATIENT
Start: 2017-07-05 | End: 2017-07-06

## 2017-07-05 RX ADMIN — OXYCODONE HYDROCHLORIDE 5 MILLIGRAM(S): 5 TABLET ORAL at 01:30

## 2017-07-05 RX ADMIN — SEVELAMER CARBONATE 2400 MILLIGRAM(S): 2400 POWDER, FOR SUSPENSION ORAL at 09:17

## 2017-07-05 RX ADMIN — OXYCODONE HYDROCHLORIDE 5 MILLIGRAM(S): 5 TABLET ORAL at 15:09

## 2017-07-05 RX ADMIN — LISINOPRIL 40 MILLIGRAM(S): 2.5 TABLET ORAL at 05:06

## 2017-07-05 RX ADMIN — Medication 80 MILLIGRAM(S): at 22:06

## 2017-07-05 RX ADMIN — SEVELAMER CARBONATE 2400 MILLIGRAM(S): 2400 POWDER, FOR SUSPENSION ORAL at 13:39

## 2017-07-05 RX ADMIN — Medication 1 TABLET(S): at 13:01

## 2017-07-05 RX ADMIN — Medication 100 MILLIGRAM(S): at 13:01

## 2017-07-05 RX ADMIN — DULOXETINE HYDROCHLORIDE 60 MILLIGRAM(S): 30 CAPSULE, DELAYED RELEASE ORAL at 13:01

## 2017-07-05 RX ADMIN — Medication 81 MILLIGRAM(S): at 13:01

## 2017-07-05 RX ADMIN — CINACALCET 60 MILLIGRAM(S): 30 TABLET, FILM COATED ORAL at 13:01

## 2017-07-05 RX ADMIN — Medication 50 MILLIGRAM(S): at 05:08

## 2017-07-05 RX ADMIN — OXYCODONE HYDROCHLORIDE 5 MILLIGRAM(S): 5 TABLET ORAL at 14:22

## 2017-07-05 RX ADMIN — OXYCODONE HYDROCHLORIDE 5 MILLIGRAM(S): 5 TABLET ORAL at 01:07

## 2017-07-05 RX ADMIN — Medication 100 MILLIGRAM(S): at 05:06

## 2017-07-05 RX ADMIN — SEVELAMER CARBONATE 2400 MILLIGRAM(S): 2400 POWDER, FOR SUSPENSION ORAL at 18:20

## 2017-07-05 RX ADMIN — Medication 100 MILLIGRAM(S): at 21:43

## 2017-07-05 RX ADMIN — ATORVASTATIN CALCIUM 20 MILLIGRAM(S): 80 TABLET, FILM COATED ORAL at 21:43

## 2017-07-05 RX ADMIN — CLOPIDOGREL BISULFATE 75 MILLIGRAM(S): 75 TABLET, FILM COATED ORAL at 21:43

## 2017-07-05 RX ADMIN — Medication 80 MILLIGRAM(S): at 10:28

## 2017-07-05 NOTE — PROGRESS NOTE ADULT - SUBJECTIVE AND OBJECTIVE BOX
[facility] Coverage    CC: no CP/SOB    TELEMETRY:     PHYSICAL EXAM:    T(C): 37 (07-05-17 @ 04:24), Max: 37 (07-05-17 @ 04:24)  HR: 73 (07-05-17 @ 04:24) (72 - 81)  BP: 170/74 (07-05-17 @ 04:24) (162/62 - 177/76)  RR: 18 (07-05-17 @ 04:24) (17 - 18)  SpO2: 96% (07-05-17 @ 04:24) (96% - 100%)  Wt(kg): --  I&O's Summary    04 Jul 2017 07:01  -  05 Jul 2017 07:00  --------------------------------------------------------  IN: 1140 mL / OUT: 2500 mL / NET: -1360 mL        Appearance: Normal	  Cardiovascular: Normal S1 S2,RRR, No JVD, No murmurs  Respiratory: Lungs clear to auscultation	  Gastrointestinal:  Soft, Non-tender, + BS	  Extremities: Normal range of motion, No clubbing, cyanosis or edema  Vascular: Peripheral pulses palpable 2+ bilaterally     LABS:	 	      07-05    138  |  97  |  17  ----------------------------<  192<H>  4.4   |  25  |  3.83<H>    Ca    7.6<L>      05 Jul 2017 07:20            CARDIAC MARKERS:

## 2017-07-05 NOTE — PROGRESS NOTE ADULT - SUBJECTIVE AND OBJECTIVE BOX
[facility]   [phone]  RENAL FOLLOW UP NOTE  --------------------------------------------------------------------------------  Chief Complaint:    24 hour events/subjective:     no new c/o breathing is better.   PAST HISTORY  --------------------------------------------------------------------------------  No significant changes to PMH, PSH, FHx, SHx, unless otherwise noted    ALLERGIES & MEDICATIONS  --------------------------------------------------------------------------------  Allergies    No Known Allergies    Intolerances      Standing Inpatient Medications  aspirin enteric coated 81 milliGRAM(s) Oral daily  lisinopril 40 milliGRAM(s) Oral daily  DULoxetine 60 milliGRAM(s) Oral daily  atorvastatin 20 milliGRAM(s) Oral at bedtime  clopidogrel Tablet 75 milliGRAM(s) Oral at bedtime  metoprolol succinate ER 50 milliGRAM(s) Oral daily  cinacalcet 60 milliGRAM(s) Oral daily  sevelamer hydrochloride 2400 milliGRAM(s) Oral three times a day with meals  hydrALAZINE 100 milliGRAM(s) Oral every 8 hours  multivitamin 1 Tablet(s) Oral daily  heparin  Injectable 5000 Unit(s) SubCutaneous every 12 hours  insulin lispro (HumaLOG) Pump 1 Each SubCutaneous Continuous Pump  dextrose 5%. 1000 milliLiter(s) IV Continuous <Continuous>  dextrose 50% Injectable 12.5 Gram(s) IV Push once  dextrose 50% Injectable 25 Gram(s) IV Push once  dextrose 50% Injectable 25 Gram(s) IV Push once  furosemide   Injectable 80 milliGRAM(s) IV Push every 12 hours    PRN Inpatient Medications  dextrose Gel 1 Dose(s) Oral once PRN  glucagon  Injectable 1 milliGRAM(s) IntraMuscular once PRN  oxyCODONE IR 5 milliGRAM(s) Oral every 6 hours PRN      REVIEW OF SYSTEMS  --------------------------------------------------------------------------------    Gen+fatigue, - fevers/chills,  CVS: denies chest pain/palpitations  Resp: denies SOB/Cough  GI: Denies N/V/Abd pain    All other systems were reviewed and are negative, except as noted.    VITALS/PHYSICAL EXAM  --------------------------------------------------------------------------------  T(C): 36.9 (07-05-17 @ 12:19), Max: 37 (07-05-17 @ 04:24)  HR: 65 (07-05-17 @ 14:20) (65 - 78)  BP: 150/71 (07-05-17 @ 14:20) (150/71 - 170/74)  RR: 18 (07-05-17 @ 14:20) (18 - 18)  SpO2: 97% (07-05-17 @ 14:20) (96% - 97%)  Wt(kg): --        07-04-17 @ 07:01  -  07-05-17 @ 07:00  --------------------------------------------------------  IN: 1140 mL / OUT: 2500 mL / NET: -1360 mL    07-05-17 @ 07:01  -  07-05-17 @ 20:41  --------------------------------------------------------  IN: 540 mL / OUT: 0 mL / NET: 540 mL      Physical Exam:  	    Physical Exam:  	Gen: alert oriented place person and date . on O2.   	Pulm: cta  no rales or ronchi or wheezing  	CV: RRR, S1/S2. no rub  	Back: No CVA tenderness; no sacral edema  	Abd: +BS, soft, nontender/nondistended  	: No suprapubic tenderness.               Extremity: No cyanosis, LLE non pitting edema no clubbing  	Psych: Normal affect and mood      LABS/STUDIES  --------------------------------------------------------------------------------    138  |  97  |  17  ----------------------------<  192      [07-05-17 @ 07:20]  4.4   |  25  |  3.83        Ca     7.6     [07-05-17 @ 07:20]            Creatinine Trend:  SCr 3.83 [07-05 @ 07:20]  SCr 6.36 [07-04 @ 12:53]  SCr 4.22 [07-03 @ 10:03]  SCr 4.55 [07-02 @ 06:53]  SCr 7.38 [07-01 @ 07:21]                  HbA1c 7.3      [06-16-17 @ 04:45]

## 2017-07-05 NOTE — PROGRESS NOTE ADULT - ASSESSMENT
60yo F w/pmhx of DM2, CAD, PAD, HTN, HLE who now presents with LLE edema/swelling, found to have severe hyperglycemia, hyperkalemia, pulmonary edema.  Hyperkalemia resolved -continue HD for ESRD  Diabetes type 1- endocrine follow  Lung nodules-pulmonary follow noted  PVD- vascular follow/LED pending  DC home after HD tomorrow if stable and arrangements for OTP dyalisis in place.  Pierce Viera MD pager [phone]

## 2017-07-05 NOTE — PROGRESS NOTE ADULT - ASSESSMENT
1- ESRD  2- CHF  3- anemia  4- htn  5- CAD  6- SHPT      hd am   check pulse ox off o2  hydralazine to cont as well as ACE-I  sensipar 30mg and  renvela one tab with meals  establish new TW  d/w her  at bedside and renal issues upadted. low sodium diet

## 2017-07-05 NOTE — PROGRESS NOTE ADULT - SUBJECTIVE AND OBJECTIVE BOX
PULMONARY PROGRESS NOTE    NATHAN MEEKS  MRN-[id number]    Patient is a 61y old  Female who presents with a chief complaint of Left leg swelling (01 Jul 2017 03:54)      HPI:  -dyspnea improved, feels much better.    ROS:   -eager to go home    MEDICATIONS  (STANDING):  aspirin enteric coated 81 milliGRAM(s) Oral daily  lisinopril 40 milliGRAM(s) Oral daily  DULoxetine 60 milliGRAM(s) Oral daily  atorvastatin 20 milliGRAM(s) Oral at bedtime  clopidogrel Tablet 75 milliGRAM(s) Oral at bedtime  metoprolol succinate ER 50 milliGRAM(s) Oral daily  cinacalcet 60 milliGRAM(s) Oral daily  sevelamer hydrochloride 2400 milliGRAM(s) Oral three times a day with meals  hydrALAZINE 100 milliGRAM(s) Oral every 8 hours  multivitamin 1 Tablet(s) Oral daily  heparin  Injectable 5000 Unit(s) SubCutaneous every 12 hours  insulin lispro (HumaLOG) Pump 1 Each SubCutaneous Continuous Pump  dextrose 5%. 1000 milliLiter(s) (50 mL/Hr) IV Continuous <Continuous>  dextrose 50% Injectable 12.5 Gram(s) IV Push once  dextrose 50% Injectable 25 Gram(s) IV Push once  dextrose 50% Injectable 25 Gram(s) IV Push once  furosemide   Injectable 80 milliGRAM(s) IV Push every 12 hours    MEDICATIONS  (PRN):  dextrose Gel 1 Dose(s) Oral once PRN Blood Glucose LESS THAN 70 milliGRAM(s)/deciLiter  glucagon  Injectable 1 milliGRAM(s) IntraMuscular once PRN Glucose <70 milliGRAM(s)/deciLiter      Vital Signs Last 24 Hrs  T(C): 36.9 (05 Jul 2017 12:19), Max: 37 (05 Jul 2017 04:24)  T(F): 98.5 (05 Jul 2017 12:19), Max: 98.6 (05 Jul 2017 04:24)  HR: 67 (05 Jul 2017 12:19) (66 - 81)  BP: 167/63 (05 Jul 2017 12:19) (153/65 - 177/53)  BP(mean): --  RR: 18 (05 Jul 2017 12:19) (17 - 18)  SpO2: 97% (05 Jul 2017 12:19) (96% - 100%)    GENERAL: The patient is awake and alert in no apparent distress.     LUNGS: faint crackles at bases    HEART: S1/S2    LABS/IMAGING: reviewed    ABG - ( 02 Jul 2017 15:27 )  pH: 7.42  /  pCO2: 41    /  pO2: 120   / HCO3: 26    / Base Excess: 2.3   /  SaO2: 99          < from: CT Angio Chest w/ IV Cont (07.02.17 @ 21:25) >  IMPRESSION:   No pulmonary embolism.    Mild pulmonary edema with small bilateral pleural effusions.    Scattered small pulmonary nodules. Recommend follow-up in 3 months. 11 mm   right upper lobe groundglass nodule series 2 image 30. This has been   progressively increasing in size and density since 2015. Neoplasm is not   excluded. Recommend pulmonary consultation.      < end of copied text >        PROBLEM LIST:  61y Female with HEALTH ISSUES - PROBLEM Dx:  dyspnea  volume overload  Lung nodules  Uncontrolled type 1 diabetes mellitus  ESRD (end stage renal disease) on dialysis      RECS:  -dyspnea secondary to volume overload/pulm edema. Improved after HD. No CO2 retention.  -pulm nodule: known, being followed by  closely.  -incentive ALMA alfredo to chair.  -DVT prophylaxis    Alem Tate MD   [phone]

## 2017-07-05 NOTE — PROGRESS NOTE ADULT - SUBJECTIVE AND OBJECTIVE BOX
Patient is a 61y old  Female who presents with a chief complaint of Left leg swelling (01 Jul 2017 03:54)      SUBJECTIVE / OVERNIGHT EVENTS: feels better, wants to go home.  Review of Systems  chest pain no  palpitations no  sob no  nausea no  headache no    MEDICATIONS  (STANDING):  aspirin enteric coated 81 milliGRAM(s) Oral daily  lisinopril 40 milliGRAM(s) Oral daily  DULoxetine 60 milliGRAM(s) Oral daily  atorvastatin 20 milliGRAM(s) Oral at bedtime  clopidogrel Tablet 75 milliGRAM(s) Oral at bedtime  metoprolol succinate ER 50 milliGRAM(s) Oral daily  cinacalcet 60 milliGRAM(s) Oral daily  sevelamer hydrochloride 2400 milliGRAM(s) Oral three times a day with meals  hydrALAZINE 100 milliGRAM(s) Oral every 8 hours  multivitamin 1 Tablet(s) Oral daily  heparin  Injectable 5000 Unit(s) SubCutaneous every 12 hours  insulin lispro (HumaLOG) Pump 1 Each SubCutaneous Continuous Pump  dextrose 5%. 1000 milliLiter(s) (50 mL/Hr) IV Continuous <Continuous>  dextrose 50% Injectable 12.5 Gram(s) IV Push once  dextrose 50% Injectable 25 Gram(s) IV Push once  dextrose 50% Injectable 25 Gram(s) IV Push once  furosemide   Injectable 80 milliGRAM(s) IV Push every 12 hours    MEDICATIONS  (PRN):  dextrose Gel 1 Dose(s) Oral once PRN Blood Glucose LESS THAN 70 milliGRAM(s)/deciLiter  glucagon  Injectable 1 milliGRAM(s) IntraMuscular once PRN Glucose <70 milliGRAM(s)/deciLiter  oxyCODONE IR 5 milliGRAM(s) Oral every 6 hours PRN Severe Pain (7 - 10)      Vital Signs Last 24 Hrs  T(C): 36.9 (07-05-17 @ 12:19), Max: 37 (07-05-17 @ 04:24)  HR: 65 (07-05-17 @ 14:20) (65 - 78)  BP: 150/71 (07-05-17 @ 14:20) (150/71 - 170/74)  RR: 18 (07-05-17 @ 14:20) (17 - 18)  SpO2: 97% (07-05-17 @ 14:20) (96% - 100%)  CAPILLARY BLOOD GLUCOSE  137 (05 Jul 2017 18:16)  181 (05 Jul 2017 13:01)  218 (05 Jul 2017 08:56)  113 (04 Jul 2017 21:15)        I&O's Summary    04 Jul 2017 07:01  -  05 Jul 2017 07:00  --------------------------------------------------------  IN: 1140 mL / OUT: 2500 mL / NET: -1360 mL    05 Jul 2017 07:01  -  05 Jul 2017 19:06  --------------------------------------------------------  IN: 540 mL / OUT: 0 mL / NET: 540 mL        PHYSICAL EXAM:  GENERAL: NAD, well-developed  HEAD:  Atraumatic, Normocephalic  EYES: EOMI, PERRLA, conjunctiva and sclera clear  NECK: Supple, No JVD  CHEST/LUNG: Clear to auscultation bilaterally; No wheeze  HEART: Regular rate and rhythm; No murmurs, rubs, or gallops  ABDOMEN: Soft, Nontender, Nondistended; Bowel sounds present  EXTREMITIES:  2+ Peripheral Pulses, No clubbing, cyanosis, or edema  PSYCH: AAOx3  NEUROLOGY: non-focal  SKIN: No rashes or lesions    LABS:    07-05    138  |  97  |  17  ----------------------------<  192<H>  4.4   |  25  |  3.83<H>    Ca    7.6<L>      05 Jul 2017 07:20                RADIOLOGY & ADDITIONAL TESTS:    Imaging Personally Reviewed:    Consultant(s) Notes Reviewed:      Care Discussed with Consultants/Other Providers:

## 2017-07-06 ENCOUNTER — TRANSCRIPTION ENCOUNTER (OUTPATIENT)
Age: 62
End: 2017-07-06

## 2017-07-06 VITALS
DIASTOLIC BLOOD PRESSURE: 62 MMHG | SYSTOLIC BLOOD PRESSURE: 129 MMHG | HEART RATE: 83 BPM | TEMPERATURE: 98 F | RESPIRATION RATE: 18 BRPM | OXYGEN SATURATION: 97 %

## 2017-07-06 LAB
ANION GAP SERPL CALC-SCNC: 14 MMOL/L — SIGNIFICANT CHANGE UP (ref 5–17)
BUN SERPL-MCNC: 27 MG/DL — HIGH (ref 7–23)
CALCIUM SERPL-MCNC: 8 MG/DL — LOW (ref 8.4–10.5)
CHLORIDE SERPL-SCNC: 98 MMOL/L — SIGNIFICANT CHANGE UP (ref 96–108)
CO2 SERPL-SCNC: 24 MMOL/L — SIGNIFICANT CHANGE UP (ref 22–31)
CREAT SERPL-MCNC: 5.82 MG/DL — HIGH (ref 0.5–1.3)
GLUCOSE SERPL-MCNC: 125 MG/DL — HIGH (ref 70–99)
HCT VFR BLD CALC: 23.7 % — LOW (ref 34.5–45)
HGB BLD-MCNC: 7.9 G/DL — LOW (ref 11.5–15.5)
MAGNESIUM SERPL-MCNC: 2.2 MG/DL — SIGNIFICANT CHANGE UP (ref 1.6–2.6)
MCHC RBC-ENTMCNC: 33.4 GM/DL — SIGNIFICANT CHANGE UP (ref 32–36)
MCHC RBC-ENTMCNC: 34.3 PG — HIGH (ref 27–34)
MCV RBC AUTO: 103 FL — HIGH (ref 80–100)
METHYLMALONATE SERPL-SCNC: 935 NMOL/L — HIGH (ref 0–378)
PLATELET # BLD AUTO: 217 K/UL — SIGNIFICANT CHANGE UP (ref 150–400)
POTASSIUM SERPL-MCNC: 5.1 MMOL/L — SIGNIFICANT CHANGE UP (ref 3.5–5.3)
POTASSIUM SERPL-SCNC: 5.1 MMOL/L — SIGNIFICANT CHANGE UP (ref 3.5–5.3)
RBC # BLD: 2.3 M/UL — LOW (ref 3.8–5.2)
RBC # FLD: 12.7 % — SIGNIFICANT CHANGE UP (ref 10.3–14.5)
SODIUM SERPL-SCNC: 136 MMOL/L — SIGNIFICANT CHANGE UP (ref 135–145)
WBC # BLD: 4.6 K/UL — SIGNIFICANT CHANGE UP (ref 3.8–10.5)
WBC # FLD AUTO: 4.6 K/UL — SIGNIFICANT CHANGE UP (ref 3.8–10.5)

## 2017-07-06 PROCEDURE — 85014 HEMATOCRIT: CPT

## 2017-07-06 PROCEDURE — 83615 LACTATE (LD) (LDH) ENZYME: CPT

## 2017-07-06 PROCEDURE — 12345: CPT | Mod: NC

## 2017-07-06 PROCEDURE — 71045 X-RAY EXAM CHEST 1 VIEW: CPT

## 2017-07-06 PROCEDURE — 93970 EXTREMITY STUDY: CPT

## 2017-07-06 PROCEDURE — 84132 ASSAY OF SERUM POTASSIUM: CPT

## 2017-07-06 PROCEDURE — 85027 COMPLETE CBC AUTOMATED: CPT

## 2017-07-06 PROCEDURE — 82330 ASSAY OF CALCIUM: CPT

## 2017-07-06 PROCEDURE — 82607 VITAMIN B-12: CPT

## 2017-07-06 PROCEDURE — 80053 COMPREHEN METABOLIC PANEL: CPT

## 2017-07-06 PROCEDURE — 82009 KETONE BODYS QUAL: CPT

## 2017-07-06 PROCEDURE — 83605 ASSAY OF LACTIC ACID: CPT

## 2017-07-06 PROCEDURE — 84100 ASSAY OF PHOSPHORUS: CPT

## 2017-07-06 PROCEDURE — 82550 ASSAY OF CK (CPK): CPT

## 2017-07-06 PROCEDURE — 83010 ASSAY OF HAPTOGLOBIN QUANT: CPT

## 2017-07-06 PROCEDURE — 82947 ASSAY GLUCOSE BLOOD QUANT: CPT

## 2017-07-06 PROCEDURE — 94640 AIRWAY INHALATION TREATMENT: CPT

## 2017-07-06 PROCEDURE — 83921 ORGANIC ACID SINGLE QUANT: CPT

## 2017-07-06 PROCEDURE — 82553 CREATINE MB FRACTION: CPT

## 2017-07-06 PROCEDURE — 71275 CT ANGIOGRAPHY CHEST: CPT

## 2017-07-06 PROCEDURE — 99261: CPT

## 2017-07-06 PROCEDURE — 99285 EMERGENCY DEPT VISIT HI MDM: CPT | Mod: 25

## 2017-07-06 PROCEDURE — 93005 ELECTROCARDIOGRAM TRACING: CPT

## 2017-07-06 PROCEDURE — 82565 ASSAY OF CREATININE: CPT

## 2017-07-06 PROCEDURE — 82746 ASSAY OF FOLIC ACID SERUM: CPT

## 2017-07-06 PROCEDURE — 80048 BASIC METABOLIC PNL TOTAL CA: CPT

## 2017-07-06 PROCEDURE — 84484 ASSAY OF TROPONIN QUANT: CPT

## 2017-07-06 PROCEDURE — 84295 ASSAY OF SERUM SODIUM: CPT

## 2017-07-06 PROCEDURE — 82010 KETONE BODYS QUAN: CPT

## 2017-07-06 PROCEDURE — 83735 ASSAY OF MAGNESIUM: CPT

## 2017-07-06 PROCEDURE — 72148 MRI LUMBAR SPINE W/O DYE: CPT

## 2017-07-06 PROCEDURE — 93925 LOWER EXTREMITY STUDY: CPT

## 2017-07-06 PROCEDURE — 82435 ASSAY OF BLOOD CHLORIDE: CPT

## 2017-07-06 PROCEDURE — 82803 BLOOD GASES ANY COMBINATION: CPT

## 2017-07-06 PROCEDURE — 85045 AUTOMATED RETICULOCYTE COUNT: CPT

## 2017-07-06 RX ORDER — ERYTHROPOIETIN 10000 [IU]/ML
20000 INJECTION, SOLUTION INTRAVENOUS; SUBCUTANEOUS ONCE
Qty: 0 | Refills: 0 | Status: COMPLETED | OUTPATIENT
Start: 2017-07-06 | End: 2017-07-06

## 2017-07-06 RX ORDER — INSULIN LISPRO 100/ML
0 VIAL (ML) SUBCUTANEOUS
Qty: 0 | Refills: 0 | COMMUNITY

## 2017-07-06 RX ADMIN — SEVELAMER CARBONATE 2400 MILLIGRAM(S): 2400 POWDER, FOR SUSPENSION ORAL at 13:26

## 2017-07-06 RX ADMIN — Medication 80 MILLIGRAM(S): at 13:24

## 2017-07-06 RX ADMIN — LISINOPRIL 40 MILLIGRAM(S): 2.5 TABLET ORAL at 13:23

## 2017-07-06 RX ADMIN — OXYCODONE HYDROCHLORIDE 5 MILLIGRAM(S): 5 TABLET ORAL at 04:30

## 2017-07-06 RX ADMIN — Medication 100 MILLIGRAM(S): at 05:30

## 2017-07-06 RX ADMIN — Medication 1 TABLET(S): at 13:25

## 2017-07-06 RX ADMIN — ERYTHROPOIETIN 20000 UNIT(S): 10000 INJECTION, SOLUTION INTRAVENOUS; SUBCUTANEOUS at 11:00

## 2017-07-06 RX ADMIN — Medication 100 MILLIGRAM(S): at 13:26

## 2017-07-06 RX ADMIN — OXYCODONE HYDROCHLORIDE 5 MILLIGRAM(S): 5 TABLET ORAL at 03:57

## 2017-07-06 RX ADMIN — Medication 81 MILLIGRAM(S): at 13:24

## 2017-07-06 RX ADMIN — Medication 50 MILLIGRAM(S): at 13:23

## 2017-07-06 RX ADMIN — CINACALCET 60 MILLIGRAM(S): 30 TABLET, FILM COATED ORAL at 13:25

## 2017-07-06 RX ADMIN — SEVELAMER CARBONATE 2400 MILLIGRAM(S): 2400 POWDER, FOR SUSPENSION ORAL at 17:42

## 2017-07-06 RX ADMIN — DULOXETINE HYDROCHLORIDE 60 MILLIGRAM(S): 30 CAPSULE, DELAYED RELEASE ORAL at 13:25

## 2017-07-06 NOTE — PROVIDER CONTACT NOTE (MEDICATION) - SITUATION
Pt refusing Heparin 5000 units SQ ordered for this am. Pt states " it makes me bruise all over". Pt was educated about rationale for & benefits of taking medication prescribed vs risks of refusal.

## 2017-07-06 NOTE — DISCHARGE NOTE ADULT - ADDITIONAL INSTRUCTIONS
Follow-up with your primary care physician and  Vascular Surgeon in 1 to 2 weeks . Call for appointment. Follow-up with your primary care physician and  Vascular doctor in 1 to 2 weeks . Call for appointment. Follow-up with your primary care physician and  Vascular Surgeon Dr. Elizalde in 1 to 2 weeks . Call for appointment.

## 2017-07-06 NOTE — DISCHARGE NOTE ADULT - HOSPITAL COURSE
To be completed by attending. 62yo F w/pmhx of DM2, CAD, PAD, HTN, HLE who now presents with LLE edema/swelling.  Patient reports that this has been chronic since LLE bypass and vein grafting but over the last week she has had increasing swelling.  Patient reports that this was uncomfortable due to the swelling, but denies that it has been "pain".  Patient is also very tired now s/p benadryl and oxycodone given in ED and does not want to expound further.  No fevers or chills, no diarrhea, no chest pain, no constipation.  Is due for dialysis today.  Denies sob/cough.Patient is TYPE 1 DM on insulin pump. Pump was discontinued in ED and received lantus 10 units sq. Endocrine was consulted by ED, to see pt in am per ED provider note. Repeat labs, if gap not closing will call MICU consult  patient is poorly compliant with eating habits   Hyperkalemia s/p cocktail in ED - resolved 6.0 to 4.5  D. Ye saw patient- anion gap likely due to renal failure not DKA beta hydroxy burate 0.1     +++++ H&P pending ++++  7/1 wkend Endocrine consult:  started on  humalog 3u TID with meals; today then switched to   - insulin pump; to be restarted at 9:45pm this evening.      s/p HD today   7/2:	Pt seen by Renal and Attending. Complaining of feeling nauseous. Vomit x 1  	at 1500.  Pt is scheduled for CTA of chest to r/o PE.  After CTA she is 	scheduled for Hemodialysis.   Pt also to be re-evaluated by Vascular secondary 	to stenosis of left leg.  See doppler report done on 7/13/17. 	  Night NP: 8 beats WCT while asleep, asymptomatic, vitals stable. mag and phos added to am bmp  7/3: Arterial and venous doppler studies will be performed in AM at 0800 per vascular lab. Concern for potential reocclusion of left fem-pop bypass. Endocrine on board for insulin pump. HD T-Th-Sat. Needs to offload more fluid.  7/4:	s/p HD today.    7/5: Seen by Cards, still volume overloaded, c/w IV lasix & HD, can add Norvasc if b/p elevated, seen by Pulm, recs appreciated    DX: Acute/Chronic Diastolic CHF/LLE edema/pain   DKA glucose >600

## 2017-07-06 NOTE — DISCHARGE NOTE ADULT - PATIENT PORTAL LINK FT
“You can access the [facility] Patient Portal, offered by Health system, by registering with the following website: [email or web address]”

## 2017-07-06 NOTE — DOWNTIME INTERRUPTION NOTE - WHICH MANUAL FORMS INITIATED?
Dialysis Orders  [facility]  Downtime Chart Checklist  [facility]   AC[facility] Downtime Form with Progress Note

## 2017-07-06 NOTE — ADVANCED PRACTICE NURSE CONSULT - REASON FOR CONSULT
Certified Diabetes Nurse Educator Consult    62 y/o female with poorly controlled T1DM with multiple complications and comorbid medical conditions. LLE edema & pain has significantly improved. She is eating her meals well. No c/o N/V. Due for site change today, has supplies. Pt consulted for uncontrolled T1DM and insulin pump therapy.

## 2017-07-06 NOTE — PROGRESS NOTE ADULT - ASSESSMENT
60yo F w/pmhx of DM2, CAD, PAD, HTN, HLE who now presents with LLE edema/swelling, found to have severe hyperglycemia, hyperkalemia, pulmonary edema.  Hyperkalemia resolved -continue HD for ESRD  Diabetes type 1- endocrine follow  Lung nodules-pulmonary follow noted  PVD- vascular follow/LED pending  DC home after HD. OTP dyalisis in place. Follow with Nephrology, Endocrine, cardiology, PMD in 3 days. QA  Pierce Viera MD pager [phone]

## 2017-07-06 NOTE — ADVANCED PRACTICE NURSE CONSULT - RECOMMEDATIONS
Pt has sufficient BG testing, insulin pump supplies at home. Case discussed with Primary RN.  Primary RN to f/u with BG monitoring, making sure pt consumes carb consistent meals, insulin pump site assessment, monitoring S/S hypo/hyperglycemia and tracking carb intake, meal/correction boluses. Insulin pump forms all completed but attestation form needs to be signed by attending physician.  Primary RN made aware and to f/u with attending physician    DSME provided regarding S/S, prevention and appropriate treatment of hyperglycemia and hypoglycemia.  Pt is  aware that she should only use hospital insulin and glucometer while in hospital .  Pt verbalizes adequate understanding of all instructions via the teach-back method. Primary RN to f/u with BG monitoring, making sure pt consumes carb consistent meals, insulin pump site assessment, monitoring S/S hypo/hyperglycemia and tracking carb intake, meal/correction boluses. Insulin pump forms all completed but attestation form needs to be signed by attending physician.  Primary RN made aware and to f/u with attending physician.     DSME provided regarding S/S, prevention and appropriate treatment of hyperglycemia and hypoglycemia.  Pt is  aware that she should only use hospital insulin and glucometer while in hospital .  Pt verbalizes adequate understanding of all instructions via the teach-back method.

## 2017-07-06 NOTE — DISCHARGE NOTE ADULT - SECONDARY DIAGNOSIS.
Acute pulmonary edema ESRD (end stage renal disease) on dialysis HTN (hypertension) Peripheral vascular disease in type 1 diabetes mellitus

## 2017-07-06 NOTE — PROGRESS NOTE ADULT - SUBJECTIVE AND OBJECTIVE BOX
[facility]   [phone]  DIALYSIS NOTE  Chief Complaint: ESRD/Ongoing hemodialysis requirement. seen on hd    24 hour events/subjective:    breathing is better.         ALLERGIES & MEDICATIONS  --------------------------------------------------------------------------------  Allergies    No Known Allergies    Intolerances      Standing Inpatient Medications  aspirin enteric coated 81 milliGRAM(s) Oral daily  lisinopril 40 milliGRAM(s) Oral daily  DULoxetine 60 milliGRAM(s) Oral daily  atorvastatin 20 milliGRAM(s) Oral at bedtime  clopidogrel Tablet 75 milliGRAM(s) Oral at bedtime  metoprolol succinate ER 50 milliGRAM(s) Oral daily  cinacalcet 60 milliGRAM(s) Oral daily  sevelamer hydrochloride 2400 milliGRAM(s) Oral three times a day with meals  hydrALAZINE 100 milliGRAM(s) Oral every 8 hours  multivitamin 1 Tablet(s) Oral daily  heparin  Injectable 5000 Unit(s) SubCutaneous every 12 hours  insulin lispro (HumaLOG) Pump 1 Each SubCutaneous Continuous Pump  dextrose 5%. 1000 milliLiter(s) IV Continuous <Continuous>  dextrose 50% Injectable 12.5 Gram(s) IV Push once  dextrose 50% Injectable 25 Gram(s) IV Push once  dextrose 50% Injectable 25 Gram(s) IV Push once  furosemide   Injectable 80 milliGRAM(s) IV Push every 12 hours    PRN Inpatient Medications  dextrose Gel 1 Dose(s) Oral once PRN  glucagon  Injectable 1 milliGRAM(s) IntraMuscular once PRN  oxyCODONE IR 5 milliGRAM(s) Oral every 6 hours PRN      REVIEW OF SYSTEMS  --------------------------------------------------------------------------------  no itching or rash  no fever or chill  no cp or palp   no sob or cough   no N/V/D/ no abd pain   ext no edema no pain         VITALS/PHYSICAL EXAM  --------------------------------------------------------------------------------  T(C): 36.6 (07-06-17 @ 05:14), Max: 36.9 (07-05-17 @ 12:19)  HR: 69 (07-06-17 @ 05:14) (65 - 70)  BP: 170/70 (07-06-17 @ 05:14) (150/71 - 170/70)  RR: 18 (07-06-17 @ 05:14) (18 - 18)  SpO2: 96% (07-06-17 @ 05:14) (96% - 97%)  Wt(kg): --        07-05-17 @ 07:01  -  07-06-17 @ 07:00  --------------------------------------------------------  IN: 1120 mL / OUT: 0 mL / NET: 1120 mL      Physical Exam:  		    	Gen: alert oriented place person and date   	Pulm: cta  no rales or ronchi  	CV: RRR, S1/S2  	Abd: +BS, soft, nontender/nondistended  	Extremity: No cyanosis, no edema no clubbing  	Neuro: No focal deficits  	    LABS/STUDIES  --------------------------------------------------------------------------------              7.9    4.6   >-----------<  217      [07-06-17 @ 07:05]              23.7     136  |  98  |  27  ----------------------------<  125      [07-06-17 @ 07:05]  5.1   |  24  |  5.82        Ca     8.0     [07-06-17 @ 07:05]      Mg     2.2     [07-06-17 @ 07:05]                    imp/suggest: ESRD      Hemodialysis Prescription:  	Access: avf  	Dialyzer: revaclear  300  	Blood Flow (mL/Min): 400  	Dialysate Flow (mL/Min): 600  	Target UF (Liters):2- 2.5 liter  	Treatment Time:210   	Potassium:  2k   	Calcium: 2.5  	  YOLANDA epogen 47794        continue with hd   see hd flow sheet

## 2017-07-06 NOTE — PROGRESS NOTE ADULT - SUBJECTIVE AND OBJECTIVE BOX
Patient is a 61y old  Female who presents with a chief complaint of Left leg swelling (06 Jul 2017 11:42)      SUBJECTIVE / OVERNIGHT EVENTS: Comfortable without new complaints. Family at bedside.     Review of Systems  chest pain no  palpitations no  sob no  nausea no  headache no    MEDICATIONS  (STANDING):  aspirin enteric coated 81 milliGRAM(s) Oral daily  lisinopril 40 milliGRAM(s) Oral daily  DULoxetine 60 milliGRAM(s) Oral daily  atorvastatin 20 milliGRAM(s) Oral at bedtime  clopidogrel Tablet 75 milliGRAM(s) Oral at bedtime  metoprolol succinate ER 50 milliGRAM(s) Oral daily  cinacalcet 60 milliGRAM(s) Oral daily  sevelamer hydrochloride 2400 milliGRAM(s) Oral three times a day with meals  hydrALAZINE 100 milliGRAM(s) Oral every 8 hours  multivitamin 1 Tablet(s) Oral daily  heparin  Injectable 5000 Unit(s) SubCutaneous every 12 hours  insulin lispro (HumaLOG) Pump 1 Each SubCutaneous Continuous Pump  dextrose 5%. 1000 milliLiter(s) (50 mL/Hr) IV Continuous <Continuous>  dextrose 50% Injectable 12.5 Gram(s) IV Push once  dextrose 50% Injectable 25 Gram(s) IV Push once  dextrose 50% Injectable 25 Gram(s) IV Push once  furosemide   Injectable 80 milliGRAM(s) IV Push every 12 hours  epoetin azalea Injectable 45700 Unit(s) IV Push once    MEDICATIONS  (PRN):  dextrose Gel 1 Dose(s) Oral once PRN Blood Glucose LESS THAN 70 milliGRAM(s)/deciLiter  glucagon  Injectable 1 milliGRAM(s) IntraMuscular once PRN Glucose <70 milliGRAM(s)/deciLiter  oxyCODONE IR 5 milliGRAM(s) Oral every 6 hours PRN Severe Pain (7 - 10)      Vital Signs Last 24 Hrs  T(C): 36.9 (07-06-17 @ 12:58), Max: 36.9 (07-06-17 @ 12:58)  HR: 83 (07-06-17 @ 12:58) (69 - 83)  BP: 129/62 (07-06-17 @ 12:58) (129/62 - 170/70)  RR: 18 (07-06-17 @ 12:58) (18 - 18)  SpO2: 97% (07-06-17 @ 12:58) (96% - 97%)  CAPILLARY BLOOD GLUCOSE  116 (06 Jul 2017 12:57)  144 (06 Jul 2017 08:30)  105 (06 Jul 2017 01:54)  112 (05 Jul 2017 22:02)  137 (05 Jul 2017 18:16)        I&O's Summary    05 Jul 2017 07:01  -  06 Jul 2017 07:00  --------------------------------------------------------  IN: 1120 mL / OUT: 0 mL / NET: 1120 mL    06 Jul 2017 07:01  -  06 Jul 2017 14:54  --------------------------------------------------------  IN: 300 mL / OUT: 0 mL / NET: 300 mL        PHYSICAL EXAM:  GENERAL: NAD, well-developed  HEAD:  Atraumatic, Normocephalic  EYES: EOMI, PERRLA, conjunctiva and sclera clear  NECK: Supple, No JVD  CHEST/LUNG: Clear to auscultation bilaterally; No wheeze  HEART: Regular rate and rhythm; No murmurs, rubs, or gallops  ABDOMEN: Soft, Nontender, Nondistended; Bowel sounds present  EXTREMITIES:  2+ Peripheral Pulses, No clubbing, cyanosis, or edema  PSYCH: AAOx3  NEUROLOGY: non-focal  SKIN: No rashes or lesions    LABS:                        7.9    4.6   )-----------( 217      ( 06 Jul 2017 07:05 )             23.7     07-06    136  |  98  |  27<H>  ----------------------------<  125<H>  5.1   |  24  |  5.82<H>    Ca    8.0<L>      06 Jul 2017 07:05  Mg     2.2     07-06                RADIOLOGY & ADDITIONAL TESTS:    Imaging Personally Reviewed:    Consultant(s) Notes Reviewed:      Care Discussed with Consultants/Other Providers:

## 2017-07-06 NOTE — DISCHARGE NOTE ADULT - PROVIDER TOKENS
MAYA:'[id number]',MAYA:'[id number]' TOKESSENCE:'[id number]',TOKESSENCE:'[id number]',MAYA:'[id number]'

## 2017-07-06 NOTE — PROGRESS NOTE ADULT - SUBJECTIVE AND OBJECTIVE BOX
Subjective: Patient seen and examined. No new events except as noted.   Feels well, no SOB, being dialyzed no CP anxious to go home    MEDICATIONS:  MEDICATIONS  (STANDING):  aspirin enteric coated 81 milliGRAM(s) Oral daily  lisinopril 40 milliGRAM(s) Oral daily  DULoxetine 60 milliGRAM(s) Oral daily  atorvastatin 20 milliGRAM(s) Oral at bedtime  clopidogrel Tablet 75 milliGRAM(s) Oral at bedtime  metoprolol succinate ER 50 milliGRAM(s) Oral daily  cinacalcet 60 milliGRAM(s) Oral daily  sevelamer hydrochloride 2400 milliGRAM(s) Oral three times a day with meals  hydrALAZINE 100 milliGRAM(s) Oral every 8 hours  multivitamin 1 Tablet(s) Oral daily  heparin  Injectable 5000 Unit(s) SubCutaneous every 12 hours  insulin lispro (HumaLOG) Pump 1 Each SubCutaneous Continuous Pump  dextrose 5%. 1000 milliLiter(s) (50 mL/Hr) IV Continuous <Continuous>  dextrose 50% Injectable 12.5 Gram(s) IV Push once  dextrose 50% Injectable 25 Gram(s) IV Push once  dextrose 50% Injectable 25 Gram(s) IV Push once  furosemide   Injectable 80 milliGRAM(s) IV Push every 12 hours  epoetin azalea Injectable 41118 Unit(s) IV Push once      PHYSICAL EXAM:  T(C): 36.9 (07-06-17 @ 12:58), Max: 36.9 (07-06-17 @ 12:58)  HR: 83 (07-06-17 @ 12:58) (65 - 83)  BP: 129/62 (07-06-17 @ 12:58) (129/62 - 170/70)  RR: 18 (07-06-17 @ 12:58) (18 - 18)  SpO2: 97% (07-06-17 @ 12:58) (96% - 97%)  Wt(kg): --  I&O's Summary    05 Jul 2017 07:01  -  06 Jul 2017 07:00  --------------------------------------------------------  IN: 1120 mL / OUT: 0 mL / NET: 1120 mL    06 Jul 2017 07:01  -  06 Jul 2017 13:30  --------------------------------------------------------  IN: 300 mL / OUT: 0 mL / NET: 300 mL          Appearance: Normal	  HEENT:   Normal oral mucosa, PERRL, EOMI	  Cardiovascular: Normal S1 S2, 1/6 BC No JVD, N  Respiratory: Lungs clear to auscultation, normal effort 	  Gastrointestinal:  Soft, Non-tender, + BS	  Psychiatry:  Mood & affect appropriate  Ext: No edema        LABS:    CARDIAC MARKERS:                                7.9    4.6   )-----------( 217      ( 06 Jul 2017 07:05 )             23.7     07-06    136  |  98  |  27<H>  ----------------------------<  125<H>  5.1   |  24  |  5.82<H>    Ca    8.0<L>      06 Jul 2017 07:05  Mg     2.2     07-06      proBNP:   Lipid Profile:   HgA1c:   TSH:           TELEMETRY: 	    ECG:  	NSR  RADIOLOGY:

## 2017-07-06 NOTE — DISCHARGE NOTE ADULT - MEDICATION SUMMARY - MEDICATIONS TO TAKE
I will START or STAY ON the medications listed below when I get home from the hospital:    aspirin 81 mg oral delayed release tablet  -- 1 tab(s) by mouth once a day  -- Indication: For Protect from clot formation    oxycodone-acetaminophen 5mg-325mg oral tablet  -- 2 tab(s) by mouth once a day (in the evening)  -- Indication: For Pain    lisinopril 40 mg oral tablet  -- 1 tab(s) by mouth once a day  -- Indication: For High blood pressure    DULoxetine 60 mg oral delayed release capsule  -- 1 cap(s) by mouth once a day  -- Indication: For depression    HumaLOG 100 units/mL subcutaneous solution  -- Humalog Pump  BASAL:  00:00 - 0.3 U/Hr  05:00 - 0.425 U/Hr    Insulin to carb ratio  00:00 1U:9gram  11:00 1U:10gram  15:00 1U:9gram    Insulin Sensitivity factor  00:00 ISF 60  07:00 ISF 40  18:00 ISF 60    Blood glucose target:  00:00 110-130  08:00 100-120    -- Indication: For Type 1 diabetes mellitus with chronic kidney disease on chronic dialysis    atorvastatin 20 mg oral tablet  -- 1 tab(s) by mouth once a day (at bedtime)  -- Indication: For High cholesterol    clopidogrel 75 mg oral tablet  -- 1 tab(s) by mouth once a day (at bedtime)  -- Indication: For ,status post LLE bypassdue to Peripheral vascular disease.Protect from clot formation,    metoprolol succinate 50 mg oral tablet, extended release  -- 1 tab(s) by mouth once a day  -- Indication: For High blood pressure    furosemide 40 mg oral tablet  -- 1 tab(s) by mouth 3 times a week on Monday , Friday and Sunday  -- Indication: For fluid overload    cinacalcet 60 mg oral tablet  -- 1 tab(s) by mouth once a day  -- Indication: For Hypercalcemia    Renvela 800 mg oral tablet  -- 3 tab(s) by mouth 3 times a day  -- Indication: For Hyperphosphatemia    hydrALAZINE 25 mg oral tablet  -- 4 tab(s) by mouth every 8 hours  -- Indication: For High blood pressure    Multiple Vitamins oral tablet  -- 1 tab(s) by mouth once a day  -- Indication: For Supplement I will START or STAY ON the medications listed below when I get home from the hospital:    aspirin 81 mg oral delayed release tablet  -- 1 tab(s) by mouth once a day  -- Indication: For Protect from clot formation    oxycodone-acetaminophen 5mg-325mg oral tablet  -- 2 tab(s) by mouth once a day (in the evening)  -- Indication: For Pain    lisinopril 40 mg oral tablet  -- 1 tab(s) by mouth once a day  -- Indication: For High blood pressure    DULoxetine 60 mg oral delayed release capsule  -- 1 cap(s) by mouth once a day  -- Indication: For depression    HumaLOG 100 units/mL subcutaneous solution  -- Humalog Pump  BASAL:  00:00 - 0.35 units/Hr  03:30-0.5 units/hr  06:00-0.425 units/hr    Insulin to carb ratio  00:00 1U:9gram  11:00 1U:10gram  15:00 1U:9gram    Insulin Sensitivity factor  00:00 ISF 60  07:00 ISF 40  18:00 ISF 60    Blood glucose target:  00:00 110-130  08:00 100-120    -- Indication: For Type 1 diabetes mellitus with chronic kidney disease on chronic dialysis    atorvastatin 20 mg oral tablet  -- 1 tab(s) by mouth once a day (at bedtime)  -- Indication: For High cholesterol    clopidogrel 75 mg oral tablet  -- 1 tab(s) by mouth once a day (at bedtime)  -- Indication: For ,status post LLE bypassdue to Peripheral vascular disease.Protect from clot formation,    metoprolol succinate 50 mg oral tablet, extended release  -- 1 tab(s) by mouth once a day  -- Indication: For High blood pressure    furosemide 40 mg oral tablet  -- 1 tab(s) by mouth 3 times a week on Monday , Friday and Sunday  -- Indication: For fluid overload    cinacalcet 60 mg oral tablet  -- 1 tab(s) by mouth once a day  -- Indication: For Hyperparathyroidism secondary renal disease    Renvela 800 mg oral tablet  -- 3 tab(s) by mouth 3 times a day  -- Indication: For Hyperphosphatemia    hydrALAZINE 25 mg oral tablet  -- 4 tab(s) by mouth every 8 hours  -- Indication: For High blood pressure    Multiple Vitamins oral tablet  -- 1 tab(s) by mouth once a day  -- Indication: For Supplement

## 2017-07-06 NOTE — PROGRESS NOTE ADULT - ASSESSMENT
1. no evidence of fluid overload  2. no evidence of ischemia  3. ESRD, CAD S/P PTCI of RCA and brachytehrap  4. PVD severe  5. DM type 1 insulin dependent    Recommend:  post dialysis discharge home on present meds

## 2017-07-06 NOTE — DISCHARGE NOTE ADULT - CARE PLAN
Principal Discharge DX:	Uncontrolled type 1 diabetes mellitus with other circulatory complication  Goal:	Improved  Instructions for follow-up, activity and diet:	HgA1C this admission.  Make sure you get your HgA1c checked every three months.  If you take oral diabetes medications, check your blood glucose two times a day.  If you take insulin, check your blood glucose before meals and at bedtime.  It's important not to skip any meals.  Keep a log of your blood glucose results and always take it with you to your doctor appointments.  Keep a list of your current medications including injectables and over the counter medications and bring this medication list with you to all your doctor appointments.  If you have not seen your ophthalmologist this year call for appointment.  Check your feet daily for redness, sores, or openings. Do not self treat. If no improvement in two days call your primary care physician for an appointment.  Low blood sugar (hypoglycemia) is a blood sugar below 70mg/dl. Check your blood sugar if you feel signs/symptoms of hypoglycemia. If your blood sugar is below 70 take 15 grams of carbohydrates (ex 4 oz of apple juice, 3-4 glucose tablets, or 4-6 oz of regular soda) wait 15 minutes and repeat blood sugar to make sure it comes up above 70.  If your blood sugar is above 70 and you are due for a meal, have a meal.  If you are not due for a meal have a snack.  This snack helps keeps your blood sugar at a safe range.  Secondary Diagnosis:	Acute pulmonary edema  Instructions for follow-up, activity and diet:	Continue with diuretics and dialysis as prescribed by your physician.  Follow-up with your primary care physician in 1 to 2 weeks. Call for appointment.  Secondary Diagnosis:	ESRD (end stage renal disease) on dialysis  Instructions for follow-up, activity and diet:	Continue with your dialysis treatments. Follow with your nephrologist (kidney physician). Continue your medications as prescribed.  Secondary Diagnosis:	HTN (hypertension)  Instructions for follow-up, activity and diet:	Low salt diet  Activity as tolerated.  Take all medication as prescribed.  Follow up with your medical doctor for routine blood pressure monitoring at your next visit.  Notify your doctor if you have any of the following symptoms:   Dizziness, Lightheadedness, Blurry vision, Headache, Chest pain, Shortness of breath  Secondary Diagnosis:	Peripheral vascular disease in type 1 diabetes mellitus  Instructions for follow-up, activity and diet:	Follow-up with your primary care physician and  Vascular Surgeon in 1 to 2 weeks . Call for appointment. Principal Discharge DX:	Uncontrolled type 1 diabetes mellitus with other circulatory complication  Goal:	Improved  Instructions for follow-up, activity and diet:	HgA1C this admission.  Make sure you get your HgA1c checked every three months.  If you take oral diabetes medications, check your blood glucose two times a day.  If you take insulin, check your blood glucose before meals and at bedtime.  It's important not to skip any meals.  Keep a log of your blood glucose results and always take it with you to your doctor appointments.  Keep a list of your current medications including injectables and over the counter medications and bring this medication list with you to all your doctor appointments.  If you have not seen your ophthalmologist this year call for appointment.  Check your feet daily for redness, sores, or openings. Do not self treat. If no improvement in two days call your primary care physician for an appointment.  Low blood sugar (hypoglycemia) is a blood sugar below 70mg/dl. Check your blood sugar if you feel signs/symptoms of hypoglycemia. If your blood sugar is below 70 take 15 grams of carbohydrates (ex 4 oz of apple juice, 3-4 glucose tablets, or 4-6 oz of regular soda) wait 15 minutes and repeat blood sugar to make sure it comes up above 70.  If your blood sugar is above 70 and you are due for a meal, have a meal.  If you are not due for a meal have a snack.  This snack helps keeps your blood sugar at a safe range.  Secondary Diagnosis:	Acute pulmonary edema  Instructions for follow-up, activity and diet:	Continue with diuretics and dialysis as prescribed by your physician.  Follow-up with your primary care physician in 1 to 2 weeks. Call for appointment.  Secondary Diagnosis:	ESRD (end stage renal disease) on dialysis  Instructions for follow-up, activity and diet:	Continue with your dialysis treatments. Follow with your nephrologist (kidney physician). Continue your medications as prescribed.  Secondary Diagnosis:	HTN (hypertension)  Instructions for follow-up, activity and diet:	Low salt diet  Activity as tolerated.  Take all medication as prescribed.  Follow up with your medical doctor for routine blood pressure monitoring at your next visit.  Notify your doctor if you have any of the following symptoms:   Dizziness, Lightheadedness, Blurry vision, Headache, Chest pain, Shortness of breath  Secondary Diagnosis:	Peripheral vascular disease in type 1 diabetes mellitus  Instructions for follow-up, activity and diet:	Follow-up with your primary care physician and  Vascular doctor in 1 to 2 weeks . Call for appointment. Principal Discharge DX:	Uncontrolled type 1 diabetes mellitus with other circulatory complication  Goal:	Improved  Instructions for follow-up, activity and diet:	HgA1C this admission.  Make sure you get your HgA1c checked every three months.  If you take oral diabetes medications, check your blood glucose two times a day.  If you take insulin, check your blood glucose before meals and at bedtime.  It's important not to skip any meals.  Keep a log of your blood glucose results and always take it with you to your doctor appointments.  Keep a list of your current medications including injectables and over the counter medications and bring this medication list with you to all your doctor appointments.  If you have not seen your ophthalmologist this year call for appointment.  Check your feet daily for redness, sores, or openings. Do not self treat. If no improvement in two days call your primary care physician for an appointment.  Low blood sugar (hypoglycemia) is a blood sugar below 70mg/dl. Check your blood sugar if you feel signs/symptoms of hypoglycemia. If your blood sugar is below 70 take 15 grams of carbohydrates (ex 4 oz of apple juice, 3-4 glucose tablets, or 4-6 oz of regular soda) wait 15 minutes and repeat blood sugar to make sure it comes up above 70.  If your blood sugar is above 70 and you are due for a meal, have a meal.  If you are not due for a meal have a snack.  This snack helps keeps your blood sugar at a safe range.  Secondary Diagnosis:	Acute pulmonary edema  Instructions for follow-up, activity and diet:	Continue with diuretics and dialysis as prescribed by your physician.  Follow-up with your primary care physician in 1 to 2 weeks. Call for appointment.  Secondary Diagnosis:	ESRD (end stage renal disease) on dialysis  Instructions for follow-up, activity and diet:	Continue with your dialysis treatments. Follow with your nephrologist (kidney physician). Continue your medications as prescribed.  Secondary Diagnosis:	HTN (hypertension)  Instructions for follow-up, activity and diet:	Low salt diet  Activity as tolerated.  Take all medication as prescribed.  Follow up with your medical doctor for routine blood pressure monitoring at your next visit.  Notify your doctor if you have any of the following symptoms:   Dizziness, Lightheadedness, Blurry vision, Headache, Chest pain, Shortness of breath  Secondary Diagnosis:	Peripheral vascular disease in type 1 diabetes mellitus  Instructions for follow-up, activity and diet:	Follow-up with your primary care physician and  Vascular Surgeon Dr. Elizalde in 1 to 2 weeks . Call for appointment. Principal Discharge DX:	Uncontrolled type 1 diabetes mellitus with other circulatory complication  Goal:	Improved  Instructions for follow-up, activity and diet:	HgA1C this admission 7.3. Follow-up with Dr. Ley . Call for appointment.   Make sure you get your HgA1c checked every three months.  If you take oral diabetes medications, check your blood glucose two times a day.  If you take insulin, check your blood glucose before meals and at bedtime.  It's important not to skip any meals.  Keep a log of your blood glucose results and always take it with you to your doctor appointments.  Keep a list of your current medications including injectables and over the counter medications and bring this medication list with you to all your doctor appointments.  If you have not seen your ophthalmologist this year call for appointment.  Check your feet daily for redness, sores, or openings. Do not self treat. If no improvement in two days call your primary care physician for an appointment.  Low blood sugar (hypoglycemia) is a blood sugar below 70mg/dl. Check your blood sugar if you feel signs/symptoms of hypoglycemia. If your blood sugar is below 70 take 15 grams of carbohydrates (ex 4 oz of apple juice, 3-4 glucose tablets, or 4-6 oz of regular soda) wait 15 minutes and repeat blood sugar to make sure it comes up above 70.  If your blood sugar is above 70 and you are due for a meal, have a meal.  If you are not due for a meal have a snack.  This snack helps keeps your blood sugar at a safe range.  Secondary Diagnosis:	Acute pulmonary edema  Instructions for follow-up, activity and diet:	Continue with diuretics and dialysis as prescribed by your physician.  Follow-up with your primary care physician in 1 to 2 weeks. Call for appointment.  Secondary Diagnosis:	ESRD (end stage renal disease) on dialysis  Instructions for follow-up, activity and diet:	Continue with your dialysis treatments. Follow with your nephrologist (kidney physician). Continue your medications as prescribed.  Secondary Diagnosis:	HTN (hypertension)  Instructions for follow-up, activity and diet:	Low salt diet  Activity as tolerated.  Take all medication as prescribed.  Follow up with your medical doctor for routine blood pressure monitoring at your next visit.  Notify your doctor if you have any of the following symptoms:   Dizziness, Lightheadedness, Blurry vision, Headache, Chest pain, Shortness of breath  Secondary Diagnosis:	Peripheral vascular disease in type 1 diabetes mellitus  Instructions for follow-up, activity and diet:	Follow-up with your primary care physician and  Vascular Surgeon Dr. Elizalde in 1 to 2 weeks . Call for appointment.

## 2017-07-06 NOTE — PROVIDER CONTACT NOTE (MEDICATION) - BACKGROUND
6/30 's/ Pain Left LE/hyperkalemic/pulm edema requiring HD. Hx : DM, PVD s/p fem pop bypass, TIA x 2, ACS, stents, HTN, HLD, ESRD on HD.

## 2017-07-06 NOTE — DISCHARGE NOTE ADULT - CARE PROVIDERS DIRECT ADDRESSES
,DirectAddress_Unknown,DirectAddress_Unknown ,DirectAddress_Unknown,DirectAddress_Unknown,[email or web address]

## 2017-07-06 NOTE — ADVANCED PRACTICE NURSE CONSULT - ASSESSMENT
Pt reports hypoglycemia awareness when BG FS are in the 60s. Insulin pump site on left abdomen and remains clean, dry, and intact despite being worn beyond 3 days.  Pt’s last changed site 7/1/17 and due to be changed 2 days ago!!  Pt instructed regarding the importance of changing her site q3 days and that she has to change her site TODAY.  Pt has 3 sets of insulin pump supplies.  Pt denies s/s hypo-hyperglycemia.  As per Endocrinologist consult, back up insulin doses are Lantus 10u QD and Humalog 4-6-6u TID AC and low SSI AC and HS.  Current insulin pump rates as follows:    Basal  12am – 0.325 units/hour  5am – 0.425 units/hour    ICR  12am- 1:9  11am- 1:10  3pm: 1:9    ISF  12am- 60  7am 0- 40  6pm – 60    Active insulin time  6 hours    BG Target:   12am-12am 100-130 mg/dl Pt reports hypoglycemia awareness when BG FS are in the 60s. Insulin pump site on left abdomen and remains clean, dry, and intact despite being worn beyond 3 days.  Pt’s last changed site 7/1/17 and due to be changed 2 days ago!!  Pt instructed regarding the importance of changing her site q3 days and that she has to change her site TODAY.  Pt has 3 sets of insulin pump supplies.  Pt denies s/s hypo-hyperglycemia.  As per Endocrinologist consult, back up insulin doses are Lantus 10u QD and Humalog 4-6-6u TID AC and low SSI AC and HS.  Current insulin pump rates as follows:    Basal  12am – 0.325 units/hour  5am – 0.425 units/hour    ICR  12am- 1:9  11am- 1:10  3pm: 1:9    ISF  12am- 60  7am - 40  6pm – 60    Active insulin time  6 hours    BG Target:   12am- 110-130 mg/dl  8am - 100-120 mg/dl

## 2017-07-23 ENCOUNTER — INPATIENT (INPATIENT)
Facility: HOSPITAL | Age: 62
LOS: 10 days | Discharge: HOME | DRG: 981 | End: 2017-08-03
Attending: INTERNAL MEDICINE | Admitting: GENERAL ACUTE CARE HOSPITAL
Payer: MEDICARE

## 2017-07-23 VITALS
DIASTOLIC BLOOD PRESSURE: 48 MMHG | WEIGHT: 132.28 LBS | RESPIRATION RATE: 20 BRPM | HEART RATE: 82 BPM | SYSTOLIC BLOOD PRESSURE: 115 MMHG | OXYGEN SATURATION: 98 %

## 2017-07-23 DIAGNOSIS — E10.9 TYPE 1 DIABETES MELLITUS WITHOUT COMPLICATIONS: ICD-10-CM

## 2017-07-23 DIAGNOSIS — R07.9 CHEST PAIN, UNSPECIFIED: ICD-10-CM

## 2017-07-23 DIAGNOSIS — E10.10 TYPE 1 DIABETES MELLITUS WITH KETOACIDOSIS WITHOUT COMA: ICD-10-CM

## 2017-07-23 DIAGNOSIS — N18.6 END STAGE RENAL DISEASE: ICD-10-CM

## 2017-07-23 DIAGNOSIS — E87.5 HYPERKALEMIA: ICD-10-CM

## 2017-07-23 DIAGNOSIS — Z98.89 OTHER SPECIFIED POSTPROCEDURAL STATES: Chronic | ICD-10-CM

## 2017-07-23 LAB
ACETONE SERPL-MCNC: ABNORMAL
ALBUMIN SERPL ELPH-MCNC: 4.4 G/DL — SIGNIFICANT CHANGE UP (ref 3.3–5)
ALP SERPL-CCNC: 275 U/L — HIGH (ref 40–120)
ALT FLD-CCNC: 13 U/L RC — SIGNIFICANT CHANGE UP (ref 10–45)
ANION GAP SERPL CALC-SCNC: 33 MMOL/L — HIGH (ref 5–17)
ANION GAP SERPL CALC-SCNC: 35 MMOL/L — HIGH (ref 5–17)
ANISOCYTOSIS BLD QL: SLIGHT — SIGNIFICANT CHANGE UP
APTT BLD: 31.1 SEC — SIGNIFICANT CHANGE UP (ref 27.5–37.4)
AST SERPL-CCNC: 20 U/L — SIGNIFICANT CHANGE UP (ref 10–40)
B-OH-BUTYR SERPL-SCNC: 8.6 MMOL/L — HIGH
BASE EXCESS BLDV CALC-SCNC: -7.9 MMOL/L — LOW (ref -2–2)
BASOPHILS # BLD AUTO: 0 K/UL — SIGNIFICANT CHANGE UP (ref 0–0.2)
BASOPHILS NFR BLD AUTO: 0.3 % — SIGNIFICANT CHANGE UP (ref 0–2)
BILIRUB SERPL-MCNC: 0.4 MG/DL — SIGNIFICANT CHANGE UP (ref 0.2–1.2)
BUN SERPL-MCNC: 41 MG/DL — HIGH (ref 7–23)
BUN SERPL-MCNC: 42 MG/DL — HIGH (ref 7–23)
CA-I SERPL-SCNC: 1.03 MMOL/L — LOW (ref 1.12–1.3)
CALCIUM SERPL-MCNC: 8.6 MG/DL — SIGNIFICANT CHANGE UP (ref 8.4–10.5)
CALCIUM SERPL-MCNC: 8.6 MG/DL — SIGNIFICANT CHANGE UP (ref 8.4–10.5)
CHLORIDE BLDV-SCNC: 87 MMOL/L — LOW (ref 96–108)
CHLORIDE SERPL-SCNC: 78 MMOL/L — LOW (ref 96–108)
CHLORIDE SERPL-SCNC: 80 MMOL/L — LOW (ref 96–108)
CK MB BLD-MCNC: 2.3 % — SIGNIFICANT CHANGE UP (ref 0–3.5)
CK MB CFR SERPL CALC: 4.1 NG/ML — HIGH (ref 0–3.8)
CK SERPL-CCNC: 175 U/L — HIGH (ref 25–170)
CO2 BLDV-SCNC: 20 MMOL/L — LOW (ref 22–30)
CO2 SERPL-SCNC: 13 MMOL/L — LOW (ref 22–31)
CO2 SERPL-SCNC: 17 MMOL/L — LOW (ref 22–31)
CREAT SERPL-MCNC: 6.26 MG/DL — HIGH (ref 0.5–1.3)
CREAT SERPL-MCNC: 6.43 MG/DL — HIGH (ref 0.5–1.3)
DACRYOCYTES BLD QL SMEAR: SLIGHT — SIGNIFICANT CHANGE UP
EOSINOPHIL # BLD AUTO: 0 K/UL — SIGNIFICANT CHANGE UP (ref 0–0.5)
EOSINOPHIL NFR BLD AUTO: 0.2 % — SIGNIFICANT CHANGE UP (ref 0–6)
GAS PNL BLDV: 126 MMOL/L — LOW (ref 136–145)
GAS PNL BLDV: SIGNIFICANT CHANGE UP
GLUCOSE BLDV-MCNC: 911 MG/DL — CRITICAL HIGH (ref 70–99)
GLUCOSE SERPL-MCNC: 921 MG/DL — CRITICAL HIGH (ref 70–99)
GLUCOSE SERPL-MCNC: 959 MG/DL — CRITICAL HIGH (ref 70–99)
HCO3 BLDV-SCNC: 19 MMOL/L — LOW (ref 21–29)
HCT VFR BLD CALC: 32.4 % — LOW (ref 34.5–45)
HCT VFR BLDA CALC: 36 % — LOW (ref 39–50)
HGB BLD CALC-MCNC: 11.6 G/DL — SIGNIFICANT CHANGE UP (ref 11.5–15.5)
HGB BLD-MCNC: 9.9 G/DL — LOW (ref 11.5–15.5)
INR BLD: 1.07 RATIO — SIGNIFICANT CHANGE UP (ref 0.88–1.16)
LACTATE BLDV-MCNC: 2.4 MMOL/L — HIGH (ref 0.7–2)
LYMPHOCYTES # BLD AUTO: 1.3 K/UL — SIGNIFICANT CHANGE UP (ref 1–3.3)
LYMPHOCYTES # BLD AUTO: 12.9 % — LOW (ref 13–44)
MACROCYTES BLD QL: SIGNIFICANT CHANGE UP
MCHC RBC-ENTMCNC: 30.5 GM/DL — LOW (ref 32–36)
MCHC RBC-ENTMCNC: 34.6 PG — HIGH (ref 27–34)
MCV RBC AUTO: 113 FL — HIGH (ref 80–100)
MONOCYTES # BLD AUTO: 0.7 K/UL — SIGNIFICANT CHANGE UP (ref 0–0.9)
MONOCYTES NFR BLD AUTO: 7.1 % — SIGNIFICANT CHANGE UP (ref 2–14)
NEUTROPHILS # BLD AUTO: 7.9 K/UL — HIGH (ref 1.8–7.4)
NEUTROPHILS NFR BLD AUTO: 79.4 % — HIGH (ref 43–77)
OTHER CELLS CSF MANUAL: 5 ML/DL — LOW (ref 18–22)
PCO2 BLDV: 45 MMHG — SIGNIFICANT CHANGE UP (ref 35–50)
PH BLDV: 7.24 — LOW (ref 7.35–7.45)
PLAT MORPH BLD: NORMAL — SIGNIFICANT CHANGE UP
PLATELET # BLD AUTO: 292 K/UL — SIGNIFICANT CHANGE UP (ref 150–400)
PO2 BLDV: 25 MMHG — SIGNIFICANT CHANGE UP (ref 25–45)
POIKILOCYTOSIS BLD QL AUTO: SLIGHT — SIGNIFICANT CHANGE UP
POTASSIUM BLDV-SCNC: 6 MMOL/L — HIGH (ref 3.5–5)
POTASSIUM SERPL-MCNC: 4.9 MMOL/L — SIGNIFICANT CHANGE UP (ref 3.5–5.3)
POTASSIUM SERPL-MCNC: 6.4 MMOL/L — CRITICAL HIGH (ref 3.5–5.3)
POTASSIUM SERPL-SCNC: 4.9 MMOL/L — SIGNIFICANT CHANGE UP (ref 3.5–5.3)
POTASSIUM SERPL-SCNC: 6.4 MMOL/L — CRITICAL HIGH (ref 3.5–5.3)
PROT SERPL-MCNC: 7.5 G/DL — SIGNIFICANT CHANGE UP (ref 6–8.3)
PROTHROM AB SERPL-ACNC: 11.6 SEC — SIGNIFICANT CHANGE UP (ref 9.8–12.7)
RBC # BLD: 2.86 M/UL — LOW (ref 3.8–5.2)
RBC # FLD: 13.2 % — SIGNIFICANT CHANGE UP (ref 10.3–14.5)
RBC BLD AUTO: ABNORMAL
SAO2 % BLDV: 29 % — LOW (ref 67–88)
SODIUM SERPL-SCNC: 128 MMOL/L — LOW (ref 135–145)
SODIUM SERPL-SCNC: 128 MMOL/L — LOW (ref 135–145)
TROPONIN T SERPL-MCNC: 0.1 NG/ML — HIGH (ref 0–0.06)
WBC # BLD: 10 K/UL — SIGNIFICANT CHANGE UP (ref 3.8–10.5)
WBC # FLD AUTO: 10 K/UL — SIGNIFICANT CHANGE UP (ref 3.8–10.5)

## 2017-07-23 PROCEDURE — 71010: CPT | Mod: 26

## 2017-07-23 PROCEDURE — 93010 ELECTROCARDIOGRAM REPORT: CPT

## 2017-07-23 PROCEDURE — 99291 CRITICAL CARE FIRST HOUR: CPT | Mod: 25

## 2017-07-23 PROCEDURE — 99292 CRITICAL CARE ADDL 30 MIN: CPT | Mod: 25

## 2017-07-23 RX ORDER — SODIUM CHLORIDE 9 MG/ML
1000 INJECTION INTRAMUSCULAR; INTRAVENOUS; SUBCUTANEOUS ONCE
Qty: 0 | Refills: 0 | Status: COMPLETED | OUTPATIENT
Start: 2017-07-23 | End: 2017-07-23

## 2017-07-23 RX ORDER — SODIUM CHLORIDE 9 MG/ML
1000 INJECTION INTRAMUSCULAR; INTRAVENOUS; SUBCUTANEOUS ONCE
Qty: 0 | Refills: 0 | Status: COMPLETED | OUTPATIENT
Start: 2017-07-23 | End: 2017-07-24

## 2017-07-23 RX ORDER — INSULIN HUMAN 100 [IU]/ML
6 INJECTION, SOLUTION SUBCUTANEOUS ONCE
Qty: 0 | Refills: 0 | Status: COMPLETED | OUTPATIENT
Start: 2017-07-23 | End: 2017-07-23

## 2017-07-23 RX ORDER — MORPHINE SULFATE 50 MG/1
4 CAPSULE, EXTENDED RELEASE ORAL ONCE
Qty: 0 | Refills: 0 | Status: DISCONTINUED | OUTPATIENT
Start: 2017-07-23 | End: 2017-07-24

## 2017-07-23 RX ORDER — ONDANSETRON 8 MG/1
8 TABLET, FILM COATED ORAL ONCE
Qty: 0 | Refills: 0 | Status: COMPLETED | OUTPATIENT
Start: 2017-07-23 | End: 2017-07-23

## 2017-07-23 RX ORDER — VANCOMYCIN HCL 1 G
1000 VIAL (EA) INTRAVENOUS ONCE
Qty: 0 | Refills: 0 | Status: COMPLETED | OUTPATIENT
Start: 2017-07-23 | End: 2017-07-24

## 2017-07-23 RX ORDER — ALBUTEROL 90 UG/1
2.5 AEROSOL, METERED ORAL ONCE
Qty: 0 | Refills: 0 | Status: COMPLETED | OUTPATIENT
Start: 2017-07-23 | End: 2017-07-23

## 2017-07-23 RX ORDER — SODIUM CHLORIDE 9 MG/ML
2000 INJECTION INTRAMUSCULAR; INTRAVENOUS; SUBCUTANEOUS ONCE
Qty: 0 | Refills: 0 | Status: DISCONTINUED | OUTPATIENT
Start: 2017-07-23 | End: 2017-07-23

## 2017-07-23 RX ORDER — INSULIN HUMAN 100 [IU]/ML
10 INJECTION, SOLUTION SUBCUTANEOUS ONCE
Qty: 0 | Refills: 0 | Status: COMPLETED | OUTPATIENT
Start: 2017-07-23 | End: 2017-07-23

## 2017-07-23 RX ORDER — INSULIN HUMAN 100 [IU]/ML
6 INJECTION, SOLUTION SUBCUTANEOUS
Qty: 100 | Refills: 0 | Status: DISCONTINUED | OUTPATIENT
Start: 2017-07-23 | End: 2017-07-24

## 2017-07-23 RX ORDER — HEPARIN SODIUM 5000 [USP'U]/ML
3500 INJECTION INTRAVENOUS; SUBCUTANEOUS ONCE
Qty: 0 | Refills: 0 | Status: COMPLETED | OUTPATIENT
Start: 2017-07-23 | End: 2017-07-23

## 2017-07-23 RX ORDER — PIPERACILLIN AND TAZOBACTAM 4; .5 G/20ML; G/20ML
3.38 INJECTION, POWDER, LYOPHILIZED, FOR SOLUTION INTRAVENOUS EVERY 12 HOURS
Qty: 0 | Refills: 0 | Status: DISCONTINUED | OUTPATIENT
Start: 2017-07-23 | End: 2017-07-25

## 2017-07-23 RX ORDER — HEPARIN SODIUM 5000 [USP'U]/ML
3500 INJECTION INTRAVENOUS; SUBCUTANEOUS EVERY 6 HOURS
Qty: 0 | Refills: 0 | Status: DISCONTINUED | OUTPATIENT
Start: 2017-07-23 | End: 2017-07-24

## 2017-07-23 RX ORDER — HEPARIN SODIUM 5000 [USP'U]/ML
INJECTION INTRAVENOUS; SUBCUTANEOUS
Qty: 25000 | Refills: 0 | Status: DISCONTINUED | OUTPATIENT
Start: 2017-07-23 | End: 2017-07-24

## 2017-07-23 RX ORDER — CALCIUM GLUCONATE 100 MG/ML
2 VIAL (ML) INTRAVENOUS ONCE
Qty: 2 | Refills: 0 | Status: COMPLETED | OUTPATIENT
Start: 2017-07-23 | End: 2017-07-23

## 2017-07-23 RX ORDER — MORPHINE SULFATE 50 MG/1
4 CAPSULE, EXTENDED RELEASE ORAL ONCE
Qty: 0 | Refills: 0 | Status: DISCONTINUED | OUTPATIENT
Start: 2017-07-23 | End: 2017-07-23

## 2017-07-23 RX ORDER — SODIUM CHLORIDE 9 MG/ML
1000 INJECTION INTRAMUSCULAR; INTRAVENOUS; SUBCUTANEOUS
Qty: 0 | Refills: 0 | Status: DISCONTINUED | OUTPATIENT
Start: 2017-07-23 | End: 2017-07-24

## 2017-07-23 RX ADMIN — INSULIN HUMAN 10 UNIT(S): 100 INJECTION, SOLUTION SUBCUTANEOUS at 22:55

## 2017-07-23 RX ADMIN — INSULIN HUMAN 6 UNIT(S)/HR: 100 INJECTION, SOLUTION SUBCUTANEOUS at 20:25

## 2017-07-23 RX ADMIN — ALBUTEROL 2.5 MILLIGRAM(S): 90 AEROSOL, METERED ORAL at 18:16

## 2017-07-23 RX ADMIN — HEPARIN SODIUM 3500 UNIT(S): 5000 INJECTION INTRAVENOUS; SUBCUTANEOUS at 20:05

## 2017-07-23 RX ADMIN — INSULIN HUMAN 10 UNIT(S): 100 INJECTION, SOLUTION SUBCUTANEOUS at 18:15

## 2017-07-23 RX ADMIN — ONDANSETRON 108 MILLIGRAM(S): 8 TABLET, FILM COATED ORAL at 19:35

## 2017-07-23 RX ADMIN — HEPARIN SODIUM 700 UNIT(S)/HR: 5000 INJECTION INTRAVENOUS; SUBCUTANEOUS at 20:06

## 2017-07-23 RX ADMIN — SODIUM CHLORIDE 2000 MILLILITER(S): 9 INJECTION INTRAMUSCULAR; INTRAVENOUS; SUBCUTANEOUS at 18:16

## 2017-07-23 RX ADMIN — Medication 200 GRAM(S): at 18:15

## 2017-07-23 RX ADMIN — INSULIN HUMAN 6 UNIT(S): 100 INJECTION, SOLUTION SUBCUTANEOUS at 20:36

## 2017-07-23 NOTE — ED ADULT NURSE REASSESSMENT NOTE - NS ED NURSE REASSESS COMMENT FT1
Report received from change of shift RN. As per Dr. Brasher, he called diabetic team because patient has self insulin pump. Pt reports having chest pain. Pt is ambulatory. A&Ox3. Pt is on dialysis Tues Thurs Saturdays. Fistula noted on L upper arm with +bruit and thrill. Pt makes "very little" urine.  at bedside. Report received from change of shift RN. As per Dr. Brasher, he called endocrinology because patient has self insulin pump. Pt reports having chest pain and pain has subsided now. Pt is ambulatory. A&Ox3. Pt is on dialysis Tues Thurs Saturdays. Fistula noted on L upper arm with +bruit and thrill. Pt makes "very little" urine.  at bedside. Report received from change of shift RN. As per Dr. Brasher, he called endocrinology because patient has self insulin pump. Pt reports having chest pain and pain has subsided now. Pt is ambulatory. A&Ox3. Pt is on dialysis Tues Thurs Saturdays. Fistula noted on L upper arm with +bruit and thrill. Pt makes "very little" urine.  at bedside. Dr. Brasher aware pt is a difficult stick and needs to get central line or US guided IV for 2nd line. RN will try to get 2nd IV. Report received from change of shift RN. As per Dr. Brasher, he called endocrinology because patient has self insulin pump. Pt reports having chest pain and pain has subsided now. Pt is ambulatory. A&Ox3. Pt is on dialysis Tues Thurs Saturdays. Fistula noted on L upper arm with +bruit and thrill. Pt makes "very little" urine. Pt c/o L leg pain. L leg swelling +2 noted.  at bedside. Dr. Brasher aware pt is a difficult stick and needs to get central line or US guided IV for 2nd line. RN will try to get 2nd IV.

## 2017-07-23 NOTE — ED PROVIDER NOTE - CARE PLAN
Principal Discharge DX:	Diabetic ketoacidosis without coma associated with type 1 diabetes mellitus  Secondary Diagnosis:	Hyperkalemia  Secondary Diagnosis:	Chest pain

## 2017-07-23 NOTE — ED PROVIDER NOTE - NS ED ROS FT
No fever, no chills, no change in vision, no throat pain, + chest pain, + abdominal pain, no joint pain, no rashes, no focal neurologic complaints,  all ROS otherwise as per HPI or negative.

## 2017-07-23 NOTE — ED ADULT NURSE REASSESSMENT NOTE - NS ED NURSE REASSESS COMMENT FT1
critically high value x2 on finger stick - Dr. Brasher aware. critically high value x2 on finger stick - Dr. Brasher aware. As per Dr. Brasher, increase insulin drip to 8ml/hr.

## 2017-07-23 NOTE — ED PROVIDER NOTE - PHYSICAL EXAMINATION
av fistula with thrill, moderate distress secondary to chest pain, NCAT, dry MM, Trachea midline, Normal conjunctiva, CTAB, Non-tachycardic, Normal perfusion, Soft, NTND, No rebound/guarding, trace edema, No deformity of extremities, Appropriate, Cooperative, With capacity and insight, No rashes, CN grossly intact, Normal coordination, No focal motor or sensory deficits

## 2017-07-23 NOTE — ED PROVIDER NOTE - MEDICAL DECISION MAKING DETAILS
CP most c/w ACS & CAD risk factors.    The pt's risk factors for ACS were reviewed as well as the EKG.  The CXR assists in r/o PNA, Ptx & esophageal tears.  The pt doesn't appear to have a PE based on the Wells Score & there is no apparent DVT.  There are no signs of pericarditis, endocarditis, or myocarditis based on hx, risk factor analysis & the ED EKG.  There is no fever.  There also doesn't appear to be an aortic dissection based on hx, exam, and signs.  Will treat hyperkalemia and elevated glucose  Plan: 1) serial EKGs/CXR/ASA/cardiac monitor/labs/nitro prn CP 2) reassess 3) admit to telemetry

## 2017-07-23 NOTE — ED ADULT NURSE REASSESSMENT NOTE - NS ED NURSE REASSESS COMMENT FT1
Pt is in no current distress. Pt reports standing makes her feel better. Pt is currently standing at bedside. Pt is in no current distress. Pt reports standing makes her feel better. Pt is currently standing at bedside. Dr. Brasher aware pt will need 3rd IV line. RN will try for 3rd IV line. Pt is in no current distress. Pt reports standing makes her feel better. Pt is currently standing at bedside. Dr. Brasher aware pt will need 3rd IV access. RN will try for 3rd IV.

## 2017-07-23 NOTE — ED PROVIDER NOTE - OBJECTIVE STATEMENT
Patient with IDDM, chest pain, shortness of breath, bibems, patient took 324 of asa prior to ambulance arrival and received 2 nitroglycerin in route with mild relief. Moderate pain Patient with IDDM, chest pain, shortness of breath, bibems, patient took 324 of asa prior to ambulance arrival and received 2 nitroglycerin in route with mild relief. Moderate pain. Persistent, mild decrescendo nature. + nausea without vomiting. Patient with IDDM, chest pain, shortness of breath, bibems, patient took 324 of asa prior to ambulance arrival and received 2 nitroglycerin in route with mild relief. Moderate pain. Persistent, mild decrescendo nature. + nausea and vomiting, no blood. states she has L arm pain and back pain. denies abd pain.  cardio Dr. Vela  pmd Dr. Pandey

## 2017-07-23 NOTE — ED ADULT NURSE REASSESSMENT NOTE - NS ED NURSE REASSESS COMMENT FT1
Pt have minor nose bleeding on R nostril - Dr. Brasher aware. Pt have minor nose bleeding on R nostril - Dr. Brasher aware. Pt was told to pinch nose by Dr. Brasher.

## 2017-07-23 NOTE — CONSULT NOTE ADULT - ASSESSMENT
Pt is a 62F PMH DMI (on insulin pump), CAD ( s/p stents), ESRD on HD (T/Th/Sa), CVA with memory defecit p/w CP and high sugars seen at home

## 2017-07-23 NOTE — ED ADULT NURSE REASSESSMENT NOTE - NS ED NURSE REASSESS COMMENT FT1
Called pharmacy for insulin drip again. As per previous nurse, RN called pharmacy for insulin drip when order was placed. Called pharmacy for insulin drip again. As per previous nurse, RN called pharmacy for insulin drip when order was placed. Incident report written - NICHOLAS Faustin aware.

## 2017-07-23 NOTE — ED ADULT NURSE NOTE - OBJECTIVE STATEMENT
62 yr old female to ed via ems c/o midstern chest pain x1.5 hrs ago Pt type 1 diabetes , with insulin pump and on Plavix has extensive cardiac hx On dialysis shunt LUE Awake alert and oriented x3 Resp even and nonlab Denies sob Denies n/v Denies sweating Denies abd pain.

## 2017-07-23 NOTE — ED PROVIDER NOTE - CRITICAL CARE PROVIDED
additional history taking/documentation/interpretation of diagnostic studies/direct patient care (not related to procedure)/consultation with other physicians

## 2017-07-23 NOTE — CONSULT NOTE ADULT - ATTENDING COMMENTS
This is a  62F with  PMH as detailed prior including  DMI (on insulin pump), CAD ( s/p stents), ESRD on HD (T/Th/Sa), CVA with memory deficits  p/w CP and high sugars seen at home. Pt has frequent admissions for elevated sugars and states that she has been in her usual state of health since d/c approx. Patient now being upgraded to the MICU with 1. acute encephalopathy on baseline neurocognitive dysfunction - metabolic  2. chest pain  3. hyperosmolar ketotic  state requiring insulin ggt 4. severe dehydration coupled with acidosis and  hyperkalemia. Care and treatment as detailed above. Case discussed extensively with patient,  at the bedside, staff, team and specialist on board. I have once again addressed the importance of dietary and medical compliance. Will need to follow up with the dietician, endocrine and  prior to discharge. This is a  62F with  PMH as detailed prior including  DMI (on insulin pump), CAD ( s/p stents), ESRD on HD (T/Th/Sa), CVA with memory deficits  p/w CP and high sugars seen at home. Pt has frequent admissions for elevated sugars and states that she has been in her usual state of health since d/c approx. Patient now being upgraded to the MICU with 1. acute encephalopathy on baseline neurocognitive dysfunction - metabolic  2. chest pain  3. hyperosmolar ketotic  state requiring insulin ggt 4. severe dehydration coupled with acidosis and  hyperkalemia, Demand ischemia. Care and treatment as detailed above. Case discussed extensively with patient,  at the bedside, staff, team and specialist on board. I have once again addressed the importance of dietary and medical compliance. Will need to follow up with the dietician, endocrine and  prior to discharge.

## 2017-07-23 NOTE — ED PROVIDER NOTE - PSH
AV (arteriovenous fistula)  Left AV graft; revision with stent placement 2-3 years ago  History of cardiac catheterization  1/2015 - no intervention  S/P cholecystectomy    S/P femoral-popliteal bypass surgery  L and R in 2013 with graft; 5/2015 CFA angioplasty left and ileofemoral endarterectomywith vein patch angioplasty of left fem-pop bypass graft  Multiple vascular surgery both leg, left fempop bypass revision 11/2015  Status post device closure of ASD  "bernna" AV (arteriovenous fistula)  Left AV graft; revision with stent placement 2-3 years ago  History of cardiac catheterization  1/2015 - no intervention  S/P cholecystectomy    S/P femoral-popliteal bypass surgery  L and R in 2013 with graft; 5/2015 CFA angioplasty left and ileofemoral endarterectomywith vein patch angioplasty of left fem-pop bypass graft  Multiple vascular surgery both leg, left fempop bypass revision 11/2015  Status post device closure of ASD  "brenna"

## 2017-07-23 NOTE — CONSULT NOTE ADULT - SUBJECTIVE AND OBJECTIVE BOX
CHIEF COMPLAINT:    HPI:    PAST MEDICAL & SURGICAL HISTORY:  Subclavian vein stenosis, left: s/p stent  Cellulitis: 2015 left foot  DKA, type 1: 1/2015  ACS (acute coronary syndrome): 1/2015 - cath revealed 100% ostial stenosis not amenable to PCI - medical management  MI, old  TIA (transient ischemic attack): x 2 - 8-9 years ago prior to ASD/VSD repair  HTN (hypertension)  CAD (coronary artery disease): s/p stents  Murmur, cardiac  Gout: past  CVA (cerebral infarction): with no residual, 8 yrs ago, prior to heart surgery - ST memory loss  Peripheral vascular disease: occluded left fem-pop bypass 5/2015  Diabetes mellitus type 1: Insulin Dependent - Medtronic  Minimed Paradigm Insulin Pump - Novolog  ESRD (end stage renal disease): dialysis , tue, thursday, saturday  Hyperlipidemia  Status post device closure of ASD: &quot;clamshell&quot;  History of cardiac catheterization: 1/2015 - no intervention  S/P femoral-popliteal bypass surgery: L and R in 2013 with graft; 5/2015 CFA angioplasty left and ileofemoral endarterectomywith vein patch angioplasty of left fem-pop bypass graft  Multiple vascular surgery both leg, left fempop bypass revision 11/2015  AV (arteriovenous fistula): Left AV graft; revision with stent placement 2-3 years ago  S/P cholecystectomy      FAMILY HISTORY:  Family history of smoking  Family history of hypertension  Family history of cancer (Sibling)      SOCIAL HISTORY:  Smoking: [ ] Never Smoked [ ] Former Smoker (__ packs x ___ years) [ ] Current Smoker  (__ packs x ___ years)  Substance Use: [ ] Never Used [ ] Used ____  EtOH Use:  Marital Status: [ ] Single [ ]  [ ]  [ ]   Sexual History:   Occupation:  Recent Travel:  Country of Birth:  Advance Directives:    Allergies    No Known Allergies    Intolerances        HOME MEDICATIONS:    REVIEW OF SYSTEMS:  Constitutional: [ ] negative [ ] fevers [ ] chills [ ] weight loss [ ] weight gain  HEENT: [ ] negative [ ] dry eyes [ ] eye irritation [ ] postnasal drip [ ] nasal congestion  CV: [ ] negative  [ ] chest pain [ ] orthopnea [ ] palpitations [ ] murmur  Resp: [ ] negative [ ] cough [ ] shortness of breath [ ] dyspnea [ ] wheezing [ ] sputum [ ] hemoptysis  GI: [ ] negative [ ] nausea [ ] vomiting [ ] diarrhea [ ] constipation [ ] abd pain [ ] dysphagia   : [ ] negative [ ] dysuria [ ] nocturia [ ] hematuria [ ] increased urinary frequency  Musculoskeletal: [ ] negative [ ] back pain [ ] myalgias [ ] arthralgias [ ] fracture  Skin: [ ] negative [ ] rash [ ] itch  Neurological: [ ] negative [ ] headache [ ] dizziness [ ] syncope [ ] weakness [ ] numbness  Psychiatric: [ ] negative [ ] anxiety [ ] depression  Endocrine: [ ] negative [ ] diabetes [ ] thyroid problem  Hematologic/Lymphatic: [ ] negative [ ] anemia [ ] bleeding problem  Allergic/Immunologic: [ ] negative [ ] itchy eyes [ ] nasal discharge [ ] hives [ ] angioedema  [ ] All other systems negative  [ ] Unable to assess ROS because ________    OBJECTIVE:  ICU Vital Signs Last 24 Hrs  T(C): 36.7 (23 Jul 2017 17:42), Max: 36.7 (23 Jul 2017 17:42)  T(F): 98 (23 Jul 2017 17:42), Max: 98 (23 Jul 2017 17:42)  HR: 82 (23 Jul 2017 18:14) (82 - 84)  BP: 118/50 (23 Jul 2017 18:14) (111/50 - 118/50)  BP(mean): --  ABP: --  ABP(mean): --  RR: 18 (23 Jul 2017 18:14) (18 - 20)  SpO2: 100% (23 Jul 2017 18:14) (98% - 100%)        CAPILLARY BLOOD GLUCOSE          PHYSICAL EXAM:  General:   HEENT:   Lymph Nodes:  Neck:   Respiratory:   Cardiovascular:   Abdomen:   Extremities:   Skin:   Neurological:  Psychiatry:    LINES:     HOSPITAL MEDICATIONS:  heparin  Infusion.  Unit(s)/Hr IV Continuous <Continuous>  heparin  Injectable 3500 Unit(s) IV Push once        insulin Infusion 6 Unit(s)/Hr IV Continuous <Continuous>      ondansetron  IVPB 8 milliGRAM(s) IV Intermittent Once                        LABS:                        9.9    10.0  )-----------( 292      ( 23 Jul 2017 18:00 )             32.4     Hgb Trend: 9.9<--  07-23    128<L>  |  78<L>  |  41<H>  ----------------------------<  959<HH>  6.4<HH>   |  17<L>  |  6.43<H>    Ca    8.6      23 Jul 2017 18:00    TPro  7.5  /  Alb  4.4  /  TBili  0.4  /  DBili  x   /  AST  20  /  ALT  13  /  AlkPhos  275<H>  07-23    Creatinine Trend: 6.43<--, 5.82<--, 3.83<--, 6.36<--, 4.22<--, 4.55<--  PT/INR - ( 23 Jul 2017 18:00 )   PT: 11.6 sec;   INR: 1.07 ratio         PTT - ( 23 Jul 2017 18:00 )  PTT:31.1 sec      Venous Blood Gas:  07-23 @ 18:00  7.24/45/25/19/29  VBG Lactate: 2.4      MICROBIOLOGY:     RADIOLOGY:  [ ] Reviewed and interpreted by me    EKG: CHIEF COMPLAINT: Chest pain, hyperglycemia    HPI:    Pt is a 62F PMH DMI (on insulin pump), CAD ( s/p stents), ESRD on HD (T/Th/Sa), CVA with memory defecit p/w CP and high sugars seen at home. Pt has frequent admissions    PAST MEDICAL & SURGICAL HISTORY:  Subclavian vein stenosis, left: s/p stent  Cellulitis: 2015 left foot  DKA, type 1: 1/2015  ACS (acute coronary syndrome): 1/2015 - cath revealed 100% ostial stenosis not amenable to PCI - medical management  MI, old  TIA (transient ischemic attack): x 2 - 8-9 years ago prior to ASD/VSD repair  HTN (hypertension)  CAD (coronary artery disease): s/p stents  Murmur, cardiac  Gout: past  CVA (cerebral infarction): with no residual, 8 yrs ago, prior to heart surgery - ST memory loss  Peripheral vascular disease: occluded left fem-pop bypass 5/2015  Diabetes mellitus type 1: Insulin Dependent - [employer]tr[employer]  Minimed Paradigm Insulin Pump - Novolog  ESRD (end stage renal disease): dialysis , tue, thursday, saturday  Hyperlipidemia  Status post device closure of ASD: &quot;clamshell&quot;  History of cardiac catheterization: 1/2015 - no intervention  S/P femoral-popliteal bypass surgery: L and R in 2013 with graft; 5/2015 CFA angioplasty left and ileofemoral endarterectomywith vein patch angioplasty of left fem-pop bypass graft  Multiple vascular surgery both leg, left fempop bypass revision 11/2015  AV (arteriovenous fistula): Left AV graft; revision with stent placement 2-3 years ago  S/P cholecystectomy      FAMILY HISTORY:  Family history of smoking  Family history of hypertension  Family history of cancer (Sibling)      SOCIAL HISTORY:  Smoking: [ ] Never Smoked [ ] Former Smoker (__ packs x ___ years) [ ] Current Smoker  (__ packs x ___ years)  Substance Use: [ ] Never Used [ ] Used ____  EtOH Use:  Marital Status: [ ] Single [ ]  [ ]  [ ]   Sexual History:   Occupation:  Recent Travel:  Country of Birth:  Advance Directives:    Allergies    No Known Allergies    Intolerances        HOME MEDICATIONS:    REVIEW OF SYSTEMS:  Constitutional: [ ] negative [ ] fevers [ ] chills [ ] weight loss [ ] weight gain  HEENT: [ ] negative [ ] dry eyes [ ] eye irritation [ ] postnasal drip [ ] nasal congestion  CV: [ ] negative  [ ] chest pain [ ] orthopnea [ ] palpitations [ ] murmur  Resp: [ ] negative [ ] cough [ ] shortness of breath [ ] dyspnea [ ] wheezing [ ] sputum [ ] hemoptysis  GI: [ ] negative [ ] nausea [ ] vomiting [ ] diarrhea [ ] constipation [ ] abd pain [ ] dysphagia   : [ ] negative [ ] dysuria [ ] nocturia [ ] hematuria [ ] increased urinary frequency  Musculoskeletal: [ ] negative [ ] back pain [ ] myalgias [ ] arthralgias [ ] fracture  Skin: [ ] negative [ ] rash [ ] itch  Neurological: [ ] negative [ ] headache [ ] dizziness [ ] syncope [ ] weakness [ ] numbness  Psychiatric: [ ] negative [ ] anxiety [ ] depression  Endocrine: [ ] negative [ ] diabetes [ ] thyroid problem  Hematologic/Lymphatic: [ ] negative [ ] anemia [ ] bleeding problem  Allergic/Immunologic: [ ] negative [ ] itchy eyes [ ] nasal discharge [ ] hives [ ] angioedema  [ ] All other systems negative  [ ] Unable to assess ROS because ________    OBJECTIVE:  ICU Vital Signs Last 24 Hrs  T(C): 36.7 (23 Jul 2017 17:42), Max: 36.7 (23 Jul 2017 17:42)  T(F): 98 (23 Jul 2017 17:42), Max: 98 (23 Jul 2017 17:42)  HR: 82 (23 Jul 2017 18:14) (82 - 84)  BP: 118/50 (23 Jul 2017 18:14) (111/50 - 118/50)  BP(mean): --  ABP: --  ABP(mean): --  RR: 18 (23 Jul 2017 18:14) (18 - 20)  SpO2: 100% (23 Jul 2017 18:14) (98% - 100%)        CAPILLARY BLOOD GLUCOSE          PHYSICAL EXAM:  General:   HEENT:   Lymph Nodes:  Neck:   Respiratory:   Cardiovascular:   Abdomen:   Extremities:   Skin:   Neurological:  Psychiatry:    LINES:     HOSPITAL MEDICATIONS:  heparin  Infusion.  Unit(s)/Hr IV Continuous <Continuous>  heparin  Injectable 3500 Unit(s) IV Push once        insulin Infusion 6 Unit(s)/Hr IV Continuous <Continuous>      ondansetron  IVPB 8 milliGRAM(s) IV Intermittent Once                        LABS:                        9.9    10.0  )-----------( 292      ( 23 Jul 2017 18:00 )             32.4     Hgb Trend: 9.9<--  07-23    128<L>  |  78<L>  |  41<H>  ----------------------------<  959<HH>  6.4<HH>   |  17<L>  |  6.43<H>    Ca    8.6      23 Jul 2017 18:00    TPro  7.5  /  Alb  4.4  /  TBili  0.4  /  DBili  x   /  AST  20  /  ALT  13  /  AlkPhos  275<H>  07-23    Creatinine Trend: 6.43<--, 5.82<--, 3.83<--, 6.36<--, 4.22<--, 4.55<--  PT/INR - ( 23 Jul 2017 18:00 )   PT: 11.6 sec;   INR: 1.07 ratio         PTT - ( 23 Jul 2017 18:00 )  PTT:31.1 sec      Venous Blood Gas:  07-23 @ 18:00  7.24/45/25/19/29  VBG Lactate: 2.4      MICROBIOLOGY:     RADIOLOGY:  [ ] Reviewed and interpreted by me    EKG: CHIEF COMPLAINT: Chest pain, hyperglycemia    HPI:    Pt is a 62F PMH DMI (on insulin pump), CAD ( s/p stents), ESRD on HD (T/Th/Sa), CVA with memory defecit p/w CP and high sugars seen at home. Pt has frequent admissions for elevated sugars and states that she has been in her usual state of health since d/c approx 2 weeks ago with appropriate finger sticks all less than 200. States that she has not yet followed up with endocrine since last d/c but has appointment scheduled in upcoming week.    PAST MEDICAL & SURGICAL HISTORY:  Subclavian vein stenosis, left: s/p stent  Cellulitis: 2015 left foot  DKA, type 1: 1/2015  ACS (acute coronary syndrome): 1/2015 - cath revealed 100% ostial stenosis not amenable to PCI - medical management  MI, old  TIA (transient ischemic attack): x 2 - 8-9 years ago prior to ASD/VSD repair  HTN (hypertension)  CAD (coronary artery disease): s/p stents  Murmur, cardiac  Gout: past  CVA (cerebral infarction): with no residual, 8 yrs ago, prior to heart surgery - ST memory loss  Peripheral vascular disease: occluded left fem-pop bypass 5/2015  Diabetes mellitus type 1: Insulin Dependent - [employer]  Minimed Paradigm Insulin Pump - Novolog  ESRD (end stage renal disease): dialysis , tue, thursday, saturday  Hyperlipidemia  Status post device closure of ASD: &quot;clamshell&quot;  History of cardiac catheterization: 1/2015 - no intervention  S/P femoral-popliteal bypass surgery: L and R in 2013 with graft; 5/2015 CFA angioplasty left and ileofemoral endarterectomywith vein patch angioplasty of left fem-pop bypass graft  Multiple vascular surgery both leg, left fempop bypass revision 11/2015  AV (arteriovenous fistula): Left AV graft; revision with stent placement 2-3 years ago  S/P cholecystectomy      FAMILY HISTORY:  Family history of smoking  Family history of hypertension  Family history of cancer (Sibling)      SOCIAL HISTORY:  Smoking: [ ] Never Smoked [ ] Former Smoker (__ packs x ___ years) [ ] Current Smoker  (__ packs x ___ years)  Substance Use: [ ] Never Used [ ] Used ____  EtOH Use:  Marital Status: [ ] Single [ ]  [ ]  [ ]   Sexual History:   Occupation:  Recent Travel:  Country of Birth:  Advance Directives:    Allergies    No Known Allergies    Intolerances        HOME MEDICATIONS:    REVIEW OF SYSTEMS:  Constitutional: [ ] negative [ ] fevers [ ] chills [ ] weight loss [ ] weight gain  HEENT: [ ] negative [ ] dry eyes [ ] eye irritation [ ] postnasal drip [ ] nasal congestion  CV: [ ] negative  [ ] chest pain [ ] orthopnea [ ] palpitations [ ] murmur  Resp: [ ] negative [ ] cough [ ] shortness of breath [ ] dyspnea [ ] wheezing [ ] sputum [ ] hemoptysis  GI: [ ] negative [ ] nausea [ ] vomiting [ ] diarrhea [ ] constipation [ ] abd pain [ ] dysphagia   : [ ] negative [ ] dysuria [ ] nocturia [ ] hematuria [ ] increased urinary frequency  Musculoskeletal: [ ] negative [ ] back pain [ ] myalgias [ ] arthralgias [ ] fracture  Skin: [ ] negative [ ] rash [ ] itch  Neurological: [ ] negative [ ] headache [ ] dizziness [ ] syncope [ ] weakness [ ] numbness  Psychiatric: [ ] negative [ ] anxiety [ ] depression  Endocrine: [ ] negative [ ] diabetes [ ] thyroid problem  Hematologic/Lymphatic: [ ] negative [ ] anemia [ ] bleeding problem  Allergic/Immunologic: [ ] negative [ ] itchy eyes [ ] nasal discharge [ ] hives [ ] angioedema  [ ] All other systems negative  [ ] Unable to assess ROS because ________    OBJECTIVE:  ICU Vital Signs Last 24 Hrs  T(C): 36.7 (23 Jul 2017 17:42), Max: 36.7 (23 Jul 2017 17:42)  T(F): 98 (23 Jul 2017 17:42), Max: 98 (23 Jul 2017 17:42)  HR: 82 (23 Jul 2017 18:14) (82 - 84)  BP: 118/50 (23 Jul 2017 18:14) (111/50 - 118/50)  BP(mean): --  ABP: --  ABP(mean): --  RR: 18 (23 Jul 2017 18:14) (18 - 20)  SpO2: 100% (23 Jul 2017 18:14) (98% - 100%)        CAPILLARY BLOOD GLUCOSE          PHYSICAL EXAM:  General:   HEENT:   Lymph Nodes:  Neck:   Respiratory:   Cardiovascular:   Abdomen:   Extremities:   Skin:   Neurological:  Psychiatry:    LINES:     HOSPITAL MEDICATIONS:  heparin  Infusion.  Unit(s)/Hr IV Continuous <Continuous>  heparin  Injectable 3500 Unit(s) IV Push once        insulin Infusion 6 Unit(s)/Hr IV Continuous <Continuous>      ondansetron  IVPB 8 milliGRAM(s) IV Intermittent Once                        LABS:                        9.9    10.0  )-----------( 292      ( 23 Jul 2017 18:00 )             32.4     Hgb Trend: 9.9<--  07-23    128<L>  |  78<L>  |  41<H>  ----------------------------<  959<HH>  6.4<HH>   |  17<L>  |  6.43<H>    Ca    8.6      23 Jul 2017 18:00    TPro  7.5  /  Alb  4.4  /  TBili  0.4  /  DBili  x   /  AST  20  /  ALT  13  /  AlkPhos  275<H>  07-23    Creatinine Trend: 6.43<--, 5.82<--, 3.83<--, 6.36<--, 4.22<--, 4.55<--  PT/INR - ( 23 Jul 2017 18:00 )   PT: 11.6 sec;   INR: 1.07 ratio         PTT - ( 23 Jul 2017 18:00 )  PTT:31.1 sec      Venous Blood Gas:  07-23 @ 18:00  7.24/45/25/19/29  VBG Lactate: 2.4      MICROBIOLOGY:     RADIOLOGY:  [ ] Reviewed and interpreted by me    EKG: CHIEF COMPLAINT: Chest pain, hyperglycemia    HPI:    Pt is a 62F PMH DMI (on insulin pump), CAD ( s/p stents), ESRD on HD (T/Th/Sa), CVA with memory defecit p/w CP and high sugars seen at home. Pt has frequent admissions for elevated sugars and states that she has been in her usual state of health since d/c approx 2 weeks ago with appropriate finger sticks all less than 200. States that she has not yet followed up with endocrine since last d/c but has appointment scheduled in upcoming week. Pt had HD yesterday and reports that today that she had watermelon, gave herself a bolus via her insulin pump. However afterwards her sugars were greater than 200, and pt began to have chest pain, prompting her to present to ED. Pt reports that she has felt febrile over past two days, but denies cough, dysuria, hematuria, night sweats, malaise. Denies LIPSCOMB, pain on exertion. Currently denies nausea, vomiting, malaise.    PAST MEDICAL & SURGICAL HISTORY:  Subclavian vein stenosis, left: s/p stent  Cellulitis: 2015 left foot  DKA, type 1: 1/2015  ACS (acute coronary syndrome): 1/2015 - cath revealed 100% ostial stenosis not amenable to PCI - medical management  MI, old  TIA (transient ischemic attack): x 2 - 8-9 years ago prior to ASD/VSD repair  HTN (hypertension)  CAD (coronary artery disease): s/p stents  Murmur, cardiac  Gout: past  CVA (cerebral infarction): with no residual, 8 yrs ago, prior to heart surgery - ST memory loss  Peripheral vascular disease: occluded left fem-pop bypass 5/2015  Diabetes mellitus type 1: Insulin Dependent - [employer]  Minimed Paradigm Insulin Pump - Novolog  ESRD (end stage renal disease): dialysis , tue, thursday, saturday  Hyperlipidemia  Status post device closure of ASD: &quot;clamshell&quot;  History of cardiac catheterization: 1/2015 - no intervention  S/P femoral-popliteal bypass surgery: L and R in 2013 with graft; 5/2015 CFA angioplasty left and ileofemoral endarterectomywith vein patch angioplasty of left fem-pop bypass graft  Multiple vascular surgery both leg, left fempop bypass revision 11/2015  AV (arteriovenous fistula): Left AV graft; revision with stent placement 2-3 years ago  S/P cholecystectomy      FAMILY HISTORY:  Family history of smoking  Family history of hypertension  Family history of cancer (Sibling)      SOCIAL HISTORY:  Smoking: [ ] Never Smoked [X] Former Smoker (__ packs x ___ years) [ ] Current Smoker  (__ packs x ___ years)  Substance Use: [X] Never Used [ ] Used ____  EtOH Use: Denies  Marital Status: [ ] Single [X]  [ ]  [ ]   Sexual History: Monogamous with spouse  Occupation:   Recent Travel:  Country of Birth:  Advance Directives:    Allergies    No Known Allergies    Intolerances        HOME MEDICATIONS:    REVIEW OF SYSTEMS:  Constitutional: [ ] negative [X] fevers [ ] chills [ ] weight loss [ ] weight gain  HEENT: [X] negative [ ] dry eyes [ ] eye irritation [ ] postnasal drip [ ] nasal congestion  CV: [ ] negative  [X] chest pain [ ] orthopnea [ ] palpitations [ ] murmur  Resp: [ X] negative [ ] cough [ ] shortness of breath [ ] dyspnea [ ] wheezing [ ] sputum [ ] hemoptysis  GI: [ ] negative [ ] nausea [ ] vomiting [ ] diarrhea [ ] constipation [X] abd pain [ ] dysphagia   : [X] negative [ ] dysuria [ ] nocturia [ ] hematuria [ ] increased urinary frequency  Musculoskeletal: [X] negative [ ] back pain [ ] myalgias [ ] arthralgias [ ] fracture  Skin: [X] negative [ ] rash [ ] itch  Neurological: [X] negative [ ] headache [ ] dizziness [ ] syncope [ ] weakness [ ] numbness  Psychiatric: [X] negative [ ] anxiety [ ] depression  Endocrine: [X] negative [ ] diabetes [ ] thyroid problem  Hematologic/Lymphatic: [X ] negative [ ] anemia [ ] bleeding problem  Allergic/Immunologic: [ ] negative [ ] itchy eyes [ ] nasal discharge [ ] hives [ ] angioedema  [ ] All other systems negative  [ ] Unable to assess ROS because ________    OBJECTIVE:  ICU Vital Signs Last 24 Hrs  T(C): 36.7 (23 Jul 2017 17:42), Max: 36.7 (23 Jul 2017 17:42)  T(F): 98 (23 Jul 2017 17:42), Max: 98 (23 Jul 2017 17:42)  HR: 82 (23 Jul 2017 18:14) (82 - 84)  BP: 118/50 (23 Jul 2017 18:14) (111/50 - 118/50)  BP(mean): --  ABP: --  ABP(mean): --  RR: 18 (23 Jul 2017 18:14) (18 - 20)  SpO2: 100% (23 Jul 2017 18:14) (98% - 100%)        CAPILLARY BLOOD GLUCOSE          PHYSICAL EXAM:  General: NAD  HEENT: EOMI, PERRLA  Lymph Nodes: NAD  Neck: Supple, no jvd  Respiratory: CTA b/l No WRR  Cardiovascular: RRR S1/S2 No MGR. Left brachial AVF  Abdomen: Suprapubic tenderness  Extremities: AROM  Skin: Warm, dry.   Neurological: non-focal  Psychiatry: AOX3    LINES:     HOSPITAL MEDICATIONS:  heparin  Infusion.  Unit(s)/Hr IV Continuous <Continuous>  heparin  Injectable 3500 Unit(s) IV Push once        insulin Infusion 6 Unit(s)/Hr IV Continuous <Continuous>      ondansetron  IVPB 8 milliGRAM(s) IV Intermittent Once                        LABS:                        9.9    10.0  )-----------( 292      ( 23 Jul 2017 18:00 )             32.4     Hgb Trend: 9.9<--  07-23    128<L>  |  78<L>  |  41<H>  ----------------------------<  959<HH>  6.4<HH>   |  17<L>  |  6.43<H>    Ca    8.6      23 Jul 2017 18:00    TPro  7.5  /  Alb  4.4  /  TBili  0.4  /  DBili  x   /  AST  20  /  ALT  13  /  AlkPhos  275<H>  07-23    Creatinine Trend: 6.43<--, 5.82<--, 3.83<--, 6.36<--, 4.22<--, 4.55<--  PT/INR - ( 23 Jul 2017 18:00 )   PT: 11.6 sec;   INR: 1.07 ratio         PTT - ( 23 Jul 2017 18:00 )  PTT:31.1 sec      Venous Blood Gas:  07-23 @ 18:00  7.24/45/25/19/29  VBG Lactate: 2.4      MICROBIOLOGY:     RADIOLOGY:  [X] Reviewed and interpreted by me  CXR - No acute findings    EKG:  Peaked t-waves

## 2017-07-23 NOTE — ED PROVIDER NOTE - PROGRESS NOTE DETAILS
Cath consult called  18:01 Cardiology called again to bedside  will treat for possible hyperkalemia and elevated blood glucose, will give calcium gluconate, will give insulin, will give albuterol as well. cardiology recommends no cath at this time and states secondary to possible hyperkalemia, patient with chest pain at this time and relayed to cardiology with understanding, will await enzymes and will start heparin gtt pt just vomited. no blood. will give zofran. - KALI House. MICU consult placed, Cardiology called again at this time secondary to nausea/vomiting and chest pain. Endocrine called again for consult, Gtt of heparin and insulin being initiated by nursing, acetone and beta hydroxybuterate pending. Endocrine initial recs appreciated, 0.1mg/kg/hr started, will repeat potassium q4h, will repeat c/s as well pt states she still has CP intermittently, also c/o her chronic leg pain now and wanting something for pain. will give morphine 4mg. - KALI House

## 2017-07-23 NOTE — ED PROVIDER NOTE - ST/T WAVE
VENUS aVR, st depression V5,6; peaked T waves jordana aVR, st depression V5,6; peaked T waves jordana aVR

## 2017-07-24 DIAGNOSIS — E10.10 TYPE 1 DIABETES MELLITUS WITH KETOACIDOSIS WITHOUT COMA: ICD-10-CM

## 2017-07-24 DIAGNOSIS — I73.9 PERIPHERAL VASCULAR DISEASE, UNSPECIFIED: ICD-10-CM

## 2017-07-24 DIAGNOSIS — I10 ESSENTIAL (PRIMARY) HYPERTENSION: ICD-10-CM

## 2017-07-24 DIAGNOSIS — G89.29 OTHER CHRONIC PAIN: ICD-10-CM

## 2017-07-24 DIAGNOSIS — I24.9 ACUTE ISCHEMIC HEART DISEASE, UNSPECIFIED: ICD-10-CM

## 2017-07-24 DIAGNOSIS — E78.4 OTHER HYPERLIPIDEMIA: ICD-10-CM

## 2017-07-24 DIAGNOSIS — E87.8 OTHER DISORDERS OF ELECTROLYTE AND FLUID BALANCE, NOT ELSEWHERE CLASSIFIED: ICD-10-CM

## 2017-07-24 DIAGNOSIS — E78.5 HYPERLIPIDEMIA, UNSPECIFIED: ICD-10-CM

## 2017-07-24 LAB
ACETONE SERPL-MCNC: ABNORMAL
ACETONE SERPL-MCNC: ABNORMAL
ACETONE SERPL-MCNC: NEGATIVE — SIGNIFICANT CHANGE UP
ACETONE SERPL-MCNC: NEGATIVE — SIGNIFICANT CHANGE UP
ACETONE SERPL-MCNC: SIGNIFICANT CHANGE UP
ALBUMIN SERPL ELPH-MCNC: 3.9 G/DL — SIGNIFICANT CHANGE UP (ref 3.3–5)
ALP SERPL-CCNC: 248 U/L — HIGH (ref 40–120)
ALT FLD-CCNC: 12 U/L RC — SIGNIFICANT CHANGE UP (ref 10–45)
ANION GAP SERPL CALC-SCNC: 15 MMOL/L — SIGNIFICANT CHANGE UP (ref 5–17)
ANION GAP SERPL CALC-SCNC: 17 MMOL/L — SIGNIFICANT CHANGE UP (ref 5–17)
ANION GAP SERPL CALC-SCNC: 20 MMOL/L — HIGH (ref 5–17)
ANION GAP SERPL CALC-SCNC: 20 MMOL/L — HIGH (ref 5–17)
ANION GAP SERPL CALC-SCNC: 21 MMOL/L — HIGH (ref 5–17)
ANION GAP SERPL CALC-SCNC: 25 MMOL/L — HIGH (ref 5–17)
ANION GAP SERPL CALC-SCNC: 28 MMOL/L — HIGH (ref 5–17)
ANION GAP SERPL CALC-SCNC: 35 MMOL/L — HIGH (ref 5–17)
APTT BLD: 33.8 SEC — SIGNIFICANT CHANGE UP (ref 27.5–37.4)
APTT BLD: 36.4 SEC — SIGNIFICANT CHANGE UP (ref 27.5–37.4)
APTT BLD: 42.5 SEC — HIGH (ref 27.5–37.4)
APTT BLD: 56.7 SEC — HIGH (ref 27.5–37.4)
AST SERPL-CCNC: 25 U/L — SIGNIFICANT CHANGE UP (ref 10–40)
B-OH-BUTYR SERPL-SCNC: 0.2 MMOL/L — SIGNIFICANT CHANGE UP
BASE EXCESS BLDA CALC-SCNC: 2.3 MMOL/L — HIGH (ref -2–2)
BASE EXCESS BLDV CALC-SCNC: 1.1 MMOL/L — SIGNIFICANT CHANGE UP (ref -2–2)
BASOPHILS # BLD AUTO: 0 K/UL — SIGNIFICANT CHANGE UP (ref 0–0.2)
BASOPHILS NFR BLD AUTO: 0.2 % — SIGNIFICANT CHANGE UP (ref 0–2)
BILIRUB SERPL-MCNC: 0.3 MG/DL — SIGNIFICANT CHANGE UP (ref 0.2–1.2)
BLD GP AB SCN SERPL QL: NEGATIVE — SIGNIFICANT CHANGE UP
BUN SERPL-MCNC: 12 MG/DL — SIGNIFICANT CHANGE UP (ref 7–23)
BUN SERPL-MCNC: 41 MG/DL — HIGH (ref 7–23)
BUN SERPL-MCNC: 45 MG/DL — HIGH (ref 7–23)
BUN SERPL-MCNC: 46 MG/DL — HIGH (ref 7–23)
BUN SERPL-MCNC: 46 MG/DL — HIGH (ref 7–23)
BUN SERPL-MCNC: 47 MG/DL — HIGH (ref 7–23)
CA-I SERPL-SCNC: 1.05 MMOL/L — LOW (ref 1.12–1.3)
CALCIUM SERPL-MCNC: 7.8 MG/DL — LOW (ref 8.4–10.5)
CALCIUM SERPL-MCNC: 8.1 MG/DL — LOW (ref 8.4–10.5)
CALCIUM SERPL-MCNC: 8.2 MG/DL — LOW (ref 8.4–10.5)
CALCIUM SERPL-MCNC: 8.3 MG/DL — LOW (ref 8.4–10.5)
CALCIUM SERPL-MCNC: 8.4 MG/DL — SIGNIFICANT CHANGE UP (ref 8.4–10.5)
CALCIUM SERPL-MCNC: 8.4 MG/DL — SIGNIFICANT CHANGE UP (ref 8.4–10.5)
CALCIUM SERPL-MCNC: 8.7 MG/DL — SIGNIFICANT CHANGE UP (ref 8.4–10.5)
CALCIUM SERPL-MCNC: 9 MG/DL — SIGNIFICANT CHANGE UP (ref 8.4–10.5)
CHLORIDE BLDV-SCNC: 87 MMOL/L — LOW (ref 96–108)
CHLORIDE SERPL-SCNC: 80 MMOL/L — LOW (ref 96–108)
CHLORIDE SERPL-SCNC: 83 MMOL/L — LOW (ref 96–108)
CHLORIDE SERPL-SCNC: 86 MMOL/L — LOW (ref 96–108)
CHLORIDE SERPL-SCNC: 87 MMOL/L — LOW (ref 96–108)
CHLORIDE SERPL-SCNC: 88 MMOL/L — LOW (ref 96–108)
CHLORIDE SERPL-SCNC: 88 MMOL/L — LOW (ref 96–108)
CHLORIDE SERPL-SCNC: 90 MMOL/L — LOW (ref 96–108)
CHLORIDE SERPL-SCNC: 97 MMOL/L — SIGNIFICANT CHANGE UP (ref 96–108)
CK MB BLD-MCNC: 5.7 % — HIGH (ref 0–3.5)
CK MB BLD-MCNC: 7.2 % — HIGH (ref 0–3.5)
CK MB BLD-MCNC: 7.6 % — HIGH (ref 0–3.5)
CK MB CFR SERPL CALC: 13 NG/ML — HIGH (ref 0–3.8)
CK MB CFR SERPL CALC: 13.5 NG/ML — HIGH (ref 0–3.8)
CK MB CFR SERPL CALC: 16.1 NG/ML — HIGH (ref 0–3.8)
CK SERPL-CCNC: 187 U/L — HIGH (ref 25–170)
CK SERPL-CCNC: 212 U/L — HIGH (ref 25–170)
CK SERPL-CCNC: 228 U/L — HIGH (ref 25–170)
CO2 BLDA-SCNC: 28 MMOL/L — SIGNIFICANT CHANGE UP (ref 22–30)
CO2 BLDV-SCNC: 27 MMOL/L — SIGNIFICANT CHANGE UP (ref 22–30)
CO2 SERPL-SCNC: 11 MMOL/L — LOW (ref 22–31)
CO2 SERPL-SCNC: 19 MMOL/L — LOW (ref 22–31)
CO2 SERPL-SCNC: 21 MMOL/L — LOW (ref 22–31)
CO2 SERPL-SCNC: 23 MMOL/L — SIGNIFICANT CHANGE UP (ref 22–31)
CO2 SERPL-SCNC: 24 MMOL/L — SIGNIFICANT CHANGE UP (ref 22–31)
CO2 SERPL-SCNC: 25 MMOL/L — SIGNIFICANT CHANGE UP (ref 22–31)
CO2 SERPL-SCNC: 27 MMOL/L — SIGNIFICANT CHANGE UP (ref 22–31)
CO2 SERPL-SCNC: 27 MMOL/L — SIGNIFICANT CHANGE UP (ref 22–31)
CREAT SERPL-MCNC: 2.93 MG/DL — HIGH (ref 0.5–1.3)
CREAT SERPL-MCNC: 6.31 MG/DL — HIGH (ref 0.5–1.3)
CREAT SERPL-MCNC: 6.68 MG/DL — HIGH (ref 0.5–1.3)
CREAT SERPL-MCNC: 6.75 MG/DL — HIGH (ref 0.5–1.3)
CREAT SERPL-MCNC: 6.96 MG/DL — HIGH (ref 0.5–1.3)
CREAT SERPL-MCNC: 6.98 MG/DL — HIGH (ref 0.5–1.3)
CREAT SERPL-MCNC: 7.1 MG/DL — HIGH (ref 0.5–1.3)
CREAT SERPL-MCNC: 7.4 MG/DL — HIGH (ref 0.5–1.3)
EOSINOPHIL # BLD AUTO: 0 K/UL — SIGNIFICANT CHANGE UP (ref 0–0.5)
EOSINOPHIL NFR BLD AUTO: 0.2 % — SIGNIFICANT CHANGE UP (ref 0–6)
GAS PNL BLDA: SIGNIFICANT CHANGE UP
GAS PNL BLDV: 128 MMOL/L — LOW (ref 136–145)
GAS PNL BLDV: SIGNIFICANT CHANGE UP
GAS PNL BLDV: SIGNIFICANT CHANGE UP
GLUCOSE BLDV-MCNC: 468 MG/DL — HIGH (ref 70–99)
GLUCOSE SERPL-MCNC: 117 MG/DL — HIGH (ref 70–99)
GLUCOSE SERPL-MCNC: 146 MG/DL — HIGH (ref 70–99)
GLUCOSE SERPL-MCNC: 147 MG/DL — HIGH (ref 70–99)
GLUCOSE SERPL-MCNC: 175 MG/DL — HIGH (ref 70–99)
GLUCOSE SERPL-MCNC: 281 MG/DL — HIGH (ref 70–99)
GLUCOSE SERPL-MCNC: 494 MG/DL — CRITICAL HIGH (ref 70–99)
GLUCOSE SERPL-MCNC: 722 MG/DL — CRITICAL HIGH (ref 70–99)
GLUCOSE SERPL-MCNC: 913 MG/DL — CRITICAL HIGH (ref 70–99)
HBA1C BLD-MCNC: 6.8 % — HIGH (ref 4–5.6)
HCO3 BLDA-SCNC: 27 MMOL/L — SIGNIFICANT CHANGE UP (ref 21–29)
HCO3 BLDV-SCNC: 25 MMOL/L — SIGNIFICANT CHANGE UP (ref 21–29)
HCT VFR BLD CALC: 24 % — LOW (ref 34.5–45)
HCT VFR BLD CALC: 24.8 % — LOW (ref 34.5–45)
HCT VFR BLDA CALC: 22 % — CRITICAL LOW (ref 39–50)
HGB BLD CALC-MCNC: 7 G/DL — CRITICAL LOW (ref 11.5–15.5)
HGB BLD-MCNC: 8.3 G/DL — LOW (ref 11.5–15.5)
HGB BLD-MCNC: 8.4 G/DL — LOW (ref 11.5–15.5)
HOROWITZ INDEX BLDA+IHG-RTO: 28 — SIGNIFICANT CHANGE UP
HOROWITZ INDEX BLDV+IHG-RTO: 28 — SIGNIFICANT CHANGE UP
INR BLD: 1.01 RATIO — SIGNIFICANT CHANGE UP (ref 0.88–1.16)
INR BLD: 1.08 RATIO — SIGNIFICANT CHANGE UP (ref 0.88–1.16)
INTUBATED: SIGNIFICANT CHANGE UP
LACTATE BLDV-MCNC: 3.7 MMOL/L — HIGH (ref 0.7–2)
LYMPHOCYTES # BLD AUTO: 0.9 K/UL — LOW (ref 1–3.3)
LYMPHOCYTES # BLD AUTO: 11.3 % — LOW (ref 13–44)
MAGNESIUM SERPL-MCNC: 2 MG/DL — SIGNIFICANT CHANGE UP (ref 1.6–2.6)
MAGNESIUM SERPL-MCNC: 2.2 MG/DL — SIGNIFICANT CHANGE UP (ref 1.6–2.6)
MAGNESIUM SERPL-MCNC: 2.4 MG/DL — SIGNIFICANT CHANGE UP (ref 1.6–2.6)
MAGNESIUM SERPL-MCNC: 2.5 MG/DL — SIGNIFICANT CHANGE UP (ref 1.6–2.6)
MAGNESIUM SERPL-MCNC: 2.5 MG/DL — SIGNIFICANT CHANGE UP (ref 1.6–2.6)
MCHC RBC-ENTMCNC: 33.9 GM/DL — SIGNIFICANT CHANGE UP (ref 32–36)
MCHC RBC-ENTMCNC: 34.4 GM/DL — SIGNIFICANT CHANGE UP (ref 32–36)
MCHC RBC-ENTMCNC: 34.6 PG — HIGH (ref 27–34)
MCHC RBC-ENTMCNC: 35.3 PG — HIGH (ref 27–34)
MCV RBC AUTO: 102 FL — HIGH (ref 80–100)
MCV RBC AUTO: 103 FL — HIGH (ref 80–100)
MONOCYTES # BLD AUTO: 0.9 K/UL — SIGNIFICANT CHANGE UP (ref 0–0.9)
MONOCYTES NFR BLD AUTO: 10.5 % — SIGNIFICANT CHANGE UP (ref 2–14)
NEUTROPHILS # BLD AUTO: 6.5 K/UL — SIGNIFICANT CHANGE UP (ref 1.8–7.4)
NEUTROPHILS NFR BLD AUTO: 77.9 % — HIGH (ref 43–77)
OSMOLALITY SERPL: 304 MOS/KG — HIGH (ref 275–300)
OTHER CELLS CSF MANUAL: 10 ML/DL — LOW (ref 18–22)
PCO2 BLDA: 46 MMHG — SIGNIFICANT CHANGE UP (ref 32–46)
PCO2 BLDV: 42 MMHG — SIGNIFICANT CHANGE UP (ref 35–50)
PH BLDA: 7.39 — SIGNIFICANT CHANGE UP (ref 7.35–7.45)
PH BLDV: 7.4 — SIGNIFICANT CHANGE UP (ref 7.35–7.45)
PHOSPHATE SERPL-MCNC: 2.1 MG/DL — LOW (ref 2.5–4.5)
PHOSPHATE SERPL-MCNC: 2.8 MG/DL — SIGNIFICANT CHANGE UP (ref 2.5–4.5)
PHOSPHATE SERPL-MCNC: 2.8 MG/DL — SIGNIFICANT CHANGE UP (ref 2.5–4.5)
PHOSPHATE SERPL-MCNC: 3.9 MG/DL — SIGNIFICANT CHANGE UP (ref 2.5–4.5)
PHOSPHATE SERPL-MCNC: 4.3 MG/DL — SIGNIFICANT CHANGE UP (ref 2.5–4.5)
PHOSPHATE SERPL-MCNC: 4.4 MG/DL — SIGNIFICANT CHANGE UP (ref 2.5–4.5)
PHOSPHATE SERPL-MCNC: 4.7 MG/DL — HIGH (ref 2.5–4.5)
PHOSPHATE SERPL-MCNC: 4.8 MG/DL — HIGH (ref 2.5–4.5)
PLATELET # BLD AUTO: 228 K/UL — SIGNIFICANT CHANGE UP (ref 150–400)
PLATELET # BLD AUTO: 238 K/UL — SIGNIFICANT CHANGE UP (ref 150–400)
PO2 BLDA: 134 MMHG — HIGH (ref 74–108)
PO2 BLDV: 117 MMHG — HIGH (ref 25–45)
POTASSIUM BLDV-SCNC: 3.9 MMOL/L — SIGNIFICANT CHANGE UP (ref 3.5–5)
POTASSIUM SERPL-MCNC: 3.5 MMOL/L — SIGNIFICANT CHANGE UP (ref 3.5–5.3)
POTASSIUM SERPL-MCNC: 3.9 MMOL/L — SIGNIFICANT CHANGE UP (ref 3.5–5.3)
POTASSIUM SERPL-MCNC: 4.1 MMOL/L — SIGNIFICANT CHANGE UP (ref 3.5–5.3)
POTASSIUM SERPL-MCNC: 4.3 MMOL/L — SIGNIFICANT CHANGE UP (ref 3.5–5.3)
POTASSIUM SERPL-MCNC: 4.4 MMOL/L — SIGNIFICANT CHANGE UP (ref 3.5–5.3)
POTASSIUM SERPL-MCNC: 4.6 MMOL/L — SIGNIFICANT CHANGE UP (ref 3.5–5.3)
POTASSIUM SERPL-MCNC: 5.3 MMOL/L — SIGNIFICANT CHANGE UP (ref 3.5–5.3)
POTASSIUM SERPL-MCNC: 5.5 MMOL/L — HIGH (ref 3.5–5.3)
POTASSIUM SERPL-SCNC: 3.5 MMOL/L — SIGNIFICANT CHANGE UP (ref 3.5–5.3)
POTASSIUM SERPL-SCNC: 3.9 MMOL/L — SIGNIFICANT CHANGE UP (ref 3.5–5.3)
POTASSIUM SERPL-SCNC: 4.1 MMOL/L — SIGNIFICANT CHANGE UP (ref 3.5–5.3)
POTASSIUM SERPL-SCNC: 4.3 MMOL/L — SIGNIFICANT CHANGE UP (ref 3.5–5.3)
POTASSIUM SERPL-SCNC: 4.4 MMOL/L — SIGNIFICANT CHANGE UP (ref 3.5–5.3)
POTASSIUM SERPL-SCNC: 4.6 MMOL/L — SIGNIFICANT CHANGE UP (ref 3.5–5.3)
POTASSIUM SERPL-SCNC: 5.3 MMOL/L — SIGNIFICANT CHANGE UP (ref 3.5–5.3)
POTASSIUM SERPL-SCNC: 5.5 MMOL/L — HIGH (ref 3.5–5.3)
PROT SERPL-MCNC: 6.7 G/DL — SIGNIFICANT CHANGE UP (ref 6–8.3)
PROTHROM AB SERPL-ACNC: 11 SEC — SIGNIFICANT CHANGE UP (ref 9.8–12.7)
PROTHROM AB SERPL-ACNC: 11.7 SEC — SIGNIFICANT CHANGE UP (ref 9.8–12.7)
RBC # BLD: 2.34 M/UL — LOW (ref 3.8–5.2)
RBC # BLD: 2.43 M/UL — LOW (ref 3.8–5.2)
RBC # FLD: 12.7 % — SIGNIFICANT CHANGE UP (ref 10.3–14.5)
RBC # FLD: 12.9 % — SIGNIFICANT CHANGE UP (ref 10.3–14.5)
RH IG SCN BLD-IMP: POSITIVE — SIGNIFICANT CHANGE UP
SAO2 % BLDA: 100 % — HIGH (ref 92–96)
SAO2 % BLDV: 99 % — HIGH (ref 67–88)
SODIUM SERPL-SCNC: 126 MMOL/L — LOW (ref 135–145)
SODIUM SERPL-SCNC: 130 MMOL/L — LOW (ref 135–145)
SODIUM SERPL-SCNC: 130 MMOL/L — LOW (ref 135–145)
SODIUM SERPL-SCNC: 132 MMOL/L — LOW (ref 135–145)
SODIUM SERPL-SCNC: 132 MMOL/L — LOW (ref 135–145)
SODIUM SERPL-SCNC: 133 MMOL/L — LOW (ref 135–145)
SODIUM SERPL-SCNC: 135 MMOL/L — SIGNIFICANT CHANGE UP (ref 135–145)
SODIUM SERPL-SCNC: 139 MMOL/L — SIGNIFICANT CHANGE UP (ref 135–145)
TROPONIN T SERPL-MCNC: 0.43 NG/ML — HIGH (ref 0–0.06)
TROPONIN T SERPL-MCNC: 1.23 NG/ML — HIGH (ref 0–0.06)
TROPONIN T SERPL-MCNC: 1.28 NG/ML — HIGH (ref 0–0.06)
WBC # BLD: 8.2 K/UL — SIGNIFICANT CHANGE UP (ref 3.8–10.5)
WBC # BLD: 8.4 K/UL — SIGNIFICANT CHANGE UP (ref 3.8–10.5)
WBC # FLD AUTO: 8.2 K/UL — SIGNIFICANT CHANGE UP (ref 3.8–10.5)
WBC # FLD AUTO: 8.4 K/UL — SIGNIFICANT CHANGE UP (ref 3.8–10.5)

## 2017-07-24 PROCEDURE — 99223 1ST HOSP IP/OBS HIGH 75: CPT

## 2017-07-24 PROCEDURE — 93306 TTE W/DOPPLER COMPLETE: CPT | Mod: 26

## 2017-07-24 PROCEDURE — 93010 ELECTROCARDIOGRAM REPORT: CPT

## 2017-07-24 PROCEDURE — 99291 CRITICAL CARE FIRST HOUR: CPT | Mod: 25

## 2017-07-24 PROCEDURE — 99233 SBSQ HOSP IP/OBS HIGH 50: CPT | Mod: GC

## 2017-07-24 PROCEDURE — 36620 INSERTION CATHETER ARTERY: CPT | Mod: GC

## 2017-07-24 PROCEDURE — 93010 ELECTROCARDIOGRAM REPORT: CPT | Mod: 77

## 2017-07-24 RX ORDER — DULOXETINE HYDROCHLORIDE 30 MG/1
60 CAPSULE, DELAYED RELEASE ORAL DAILY
Qty: 0 | Refills: 0 | Status: DISCONTINUED | OUTPATIENT
Start: 2017-07-24 | End: 2017-08-03

## 2017-07-24 RX ORDER — OXYCODONE AND ACETAMINOPHEN 5; 325 MG/1; MG/1
1 TABLET ORAL EVERY 6 HOURS
Qty: 0 | Refills: 0 | Status: DISCONTINUED | OUTPATIENT
Start: 2017-07-24 | End: 2017-07-31

## 2017-07-24 RX ORDER — INSULIN HUMAN 100 [IU]/ML
5 INJECTION, SOLUTION SUBCUTANEOUS
Qty: 100 | Refills: 0 | Status: DISCONTINUED | OUTPATIENT
Start: 2017-07-24 | End: 2017-07-24

## 2017-07-24 RX ORDER — SODIUM CHLORIDE 9 MG/ML
1000 INJECTION, SOLUTION INTRAVENOUS
Qty: 0 | Refills: 0 | Status: DISCONTINUED | OUTPATIENT
Start: 2017-07-24 | End: 2017-07-24

## 2017-07-24 RX ORDER — POTASSIUM PHOSPHATE, MONOBASIC POTASSIUM PHOSPHATE, DIBASIC 236; 224 MG/ML; MG/ML
15 INJECTION, SOLUTION INTRAVENOUS ONCE
Qty: 0 | Refills: 0 | Status: COMPLETED | OUTPATIENT
Start: 2017-07-24 | End: 2017-07-25

## 2017-07-24 RX ORDER — HEPARIN SODIUM 5000 [USP'U]/ML
INJECTION INTRAVENOUS; SUBCUTANEOUS
Qty: 25000 | Refills: 0 | Status: DISCONTINUED | OUTPATIENT
Start: 2017-07-24 | End: 2017-07-25

## 2017-07-24 RX ORDER — CALCIUM GLUCONATE 100 MG/ML
1 VIAL (ML) INTRAVENOUS ONCE
Qty: 1 | Refills: 0 | Status: COMPLETED | OUTPATIENT
Start: 2017-07-24 | End: 2017-07-24

## 2017-07-24 RX ORDER — SEVELAMER CARBONATE 2400 MG/1
2400 POWDER, FOR SUSPENSION ORAL EVERY 8 HOURS
Qty: 0 | Refills: 0 | Status: DISCONTINUED | OUTPATIENT
Start: 2017-07-24 | End: 2017-07-26

## 2017-07-24 RX ORDER — CALCIUM GLUCONATE 100 MG/ML
2 VIAL (ML) INTRAVENOUS ONCE
Qty: 2 | Refills: 0 | Status: COMPLETED | OUTPATIENT
Start: 2017-07-24 | End: 2017-07-24

## 2017-07-24 RX ORDER — SODIUM CHLORIDE 9 MG/ML
250 INJECTION INTRAMUSCULAR; INTRAVENOUS; SUBCUTANEOUS ONCE
Qty: 0 | Refills: 0 | Status: COMPLETED | OUTPATIENT
Start: 2017-07-24 | End: 2017-07-24

## 2017-07-24 RX ORDER — CINACALCET 30 MG/1
60 TABLET, FILM COATED ORAL DAILY
Qty: 0 | Refills: 0 | Status: DISCONTINUED | OUTPATIENT
Start: 2017-07-24 | End: 2017-08-03

## 2017-07-24 RX ORDER — INSULIN HUMAN 100 [IU]/ML
2 INJECTION, SOLUTION SUBCUTANEOUS
Qty: 100 | Refills: 0 | Status: DISCONTINUED | OUTPATIENT
Start: 2017-07-24 | End: 2017-07-25

## 2017-07-24 RX ORDER — ATORVASTATIN CALCIUM 80 MG/1
20 TABLET, FILM COATED ORAL AT BEDTIME
Qty: 0 | Refills: 0 | Status: DISCONTINUED | OUTPATIENT
Start: 2017-07-24 | End: 2017-08-03

## 2017-07-24 RX ORDER — METOPROLOL TARTRATE 50 MG
50 TABLET ORAL DAILY
Qty: 0 | Refills: 0 | Status: DISCONTINUED | OUTPATIENT
Start: 2017-07-24 | End: 2017-07-29

## 2017-07-24 RX ORDER — DEXTROSE 50 % IN WATER 50 %
25 SYRINGE (ML) INTRAVENOUS
Qty: 0 | Refills: 0 | Status: DISCONTINUED | OUTPATIENT
Start: 2017-07-24 | End: 2017-08-03

## 2017-07-24 RX ORDER — NOREPINEPHRINE BITARTRATE/D5W 8 MG/250ML
0.01 PLASTIC BAG, INJECTION (ML) INTRAVENOUS
Qty: 8 | Refills: 0 | Status: DISCONTINUED | OUTPATIENT
Start: 2017-07-24 | End: 2017-07-24

## 2017-07-24 RX ORDER — SENNA PLUS 8.6 MG/1
2 TABLET ORAL AT BEDTIME
Qty: 0 | Refills: 0 | Status: DISCONTINUED | OUTPATIENT
Start: 2017-07-24 | End: 2017-08-03

## 2017-07-24 RX ORDER — SODIUM CHLORIDE 9 MG/ML
1000 INJECTION, SOLUTION INTRAVENOUS
Qty: 0 | Refills: 0 | Status: DISCONTINUED | OUTPATIENT
Start: 2017-07-24 | End: 2017-07-25

## 2017-07-24 RX ORDER — INSULIN HUMAN 100 [IU]/ML
1 INJECTION, SOLUTION SUBCUTANEOUS
Qty: 100 | Refills: 0 | Status: DISCONTINUED | OUTPATIENT
Start: 2017-07-24 | End: 2017-07-24

## 2017-07-24 RX ORDER — MORPHINE SULFATE 50 MG/1
2 CAPSULE, EXTENDED RELEASE ORAL ONCE
Qty: 0 | Refills: 0 | Status: DISCONTINUED | OUTPATIENT
Start: 2017-07-24 | End: 2017-07-24

## 2017-07-24 RX ORDER — DOCUSATE SODIUM 100 MG
100 CAPSULE ORAL THREE TIMES A DAY
Qty: 0 | Refills: 0 | Status: DISCONTINUED | OUTPATIENT
Start: 2017-07-24 | End: 2017-08-03

## 2017-07-24 RX ORDER — ACETAMINOPHEN 500 MG
650 TABLET ORAL EVERY 6 HOURS
Qty: 0 | Refills: 0 | Status: DISCONTINUED | OUTPATIENT
Start: 2017-07-24 | End: 2017-08-03

## 2017-07-24 RX ORDER — ASPIRIN/CALCIUM CARB/MAGNESIUM 324 MG
81 TABLET ORAL DAILY
Qty: 0 | Refills: 0 | Status: DISCONTINUED | OUTPATIENT
Start: 2017-07-24 | End: 2017-08-03

## 2017-07-24 RX ORDER — INSULIN HUMAN 100 [IU]/ML
10 INJECTION, SOLUTION SUBCUTANEOUS
Qty: 100 | Refills: 0 | Status: DISCONTINUED | OUTPATIENT
Start: 2017-07-24 | End: 2017-07-24

## 2017-07-24 RX ORDER — DEXTROSE 50 % IN WATER 50 %
50 SYRINGE (ML) INTRAVENOUS
Qty: 0 | Refills: 0 | Status: DISCONTINUED | OUTPATIENT
Start: 2017-07-24 | End: 2017-08-03

## 2017-07-24 RX ORDER — CLOPIDOGREL BISULFATE 75 MG/1
75 TABLET, FILM COATED ORAL DAILY
Qty: 0 | Refills: 0 | Status: DISCONTINUED | OUTPATIENT
Start: 2017-07-24 | End: 2017-08-03

## 2017-07-24 RX ORDER — HYDRALAZINE HCL 50 MG
100 TABLET ORAL EVERY 8 HOURS
Qty: 0 | Refills: 0 | Status: DISCONTINUED | OUTPATIENT
Start: 2017-07-24 | End: 2017-08-03

## 2017-07-24 RX ADMIN — Medication 81 MILLIGRAM(S): at 11:07

## 2017-07-24 RX ADMIN — PIPERACILLIN AND TAZOBACTAM 25 GRAM(S): 4; .5 INJECTION, POWDER, LYOPHILIZED, FOR SOLUTION INTRAVENOUS at 05:30

## 2017-07-24 RX ADMIN — INSULIN HUMAN 1 UNIT(S)/HR: 100 INJECTION, SOLUTION SUBCUTANEOUS at 06:07

## 2017-07-24 RX ADMIN — DULOXETINE HYDROCHLORIDE 60 MILLIGRAM(S): 30 CAPSULE, DELAYED RELEASE ORAL at 11:07

## 2017-07-24 RX ADMIN — Medication 100 MILLIGRAM(S): at 22:05

## 2017-07-24 RX ADMIN — SEVELAMER CARBONATE 2400 MILLIGRAM(S): 2400 POWDER, FOR SUSPENSION ORAL at 13:46

## 2017-07-24 RX ADMIN — SODIUM CHLORIDE 50 MILLILITER(S): 9 INJECTION, SOLUTION INTRAVENOUS at 05:55

## 2017-07-24 RX ADMIN — Medication 50 MILLIGRAM(S): at 05:31

## 2017-07-24 RX ADMIN — PIPERACILLIN AND TAZOBACTAM 25 GRAM(S): 4; .5 INJECTION, POWDER, LYOPHILIZED, FOR SOLUTION INTRAVENOUS at 22:05

## 2017-07-24 RX ADMIN — MORPHINE SULFATE 2 MILLIGRAM(S): 50 CAPSULE, EXTENDED RELEASE ORAL at 01:24

## 2017-07-24 RX ADMIN — CINACALCET 60 MILLIGRAM(S): 30 TABLET, FILM COATED ORAL at 11:07

## 2017-07-24 RX ADMIN — INSULIN HUMAN 2 UNIT(S)/HR: 100 INJECTION, SOLUTION SUBCUTANEOUS at 23:20

## 2017-07-24 RX ADMIN — ATORVASTATIN CALCIUM 20 MILLIGRAM(S): 80 TABLET, FILM COATED ORAL at 21:57

## 2017-07-24 RX ADMIN — HEPARIN SODIUM 950 UNIT(S)/HR: 5000 INJECTION INTRAVENOUS; SUBCUTANEOUS at 10:35

## 2017-07-24 RX ADMIN — OXYCODONE AND ACETAMINOPHEN 1 TABLET(S): 5; 325 TABLET ORAL at 21:57

## 2017-07-24 RX ADMIN — MORPHINE SULFATE 2 MILLIGRAM(S): 50 CAPSULE, EXTENDED RELEASE ORAL at 00:54

## 2017-07-24 RX ADMIN — INSULIN HUMAN 5 UNIT(S)/HR: 100 INJECTION, SOLUTION SUBCUTANEOUS at 04:01

## 2017-07-24 RX ADMIN — OXYCODONE AND ACETAMINOPHEN 1 TABLET(S): 5; 325 TABLET ORAL at 22:15

## 2017-07-24 RX ADMIN — OXYCODONE AND ACETAMINOPHEN 1 TABLET(S): 5; 325 TABLET ORAL at 17:44

## 2017-07-24 RX ADMIN — Medication 250 MILLIGRAM(S): at 02:11

## 2017-07-24 RX ADMIN — CLOPIDOGREL BISULFATE 75 MILLIGRAM(S): 75 TABLET, FILM COATED ORAL at 11:07

## 2017-07-24 RX ADMIN — SODIUM CHLORIDE 30 MILLILITER(S): 9 INJECTION, SOLUTION INTRAVENOUS at 12:00

## 2017-07-24 RX ADMIN — HEPARIN SODIUM 1050 UNIT(S)/HR: 5000 INJECTION INTRAVENOUS; SUBCUTANEOUS at 23:20

## 2017-07-24 RX ADMIN — Medication 100 MILLIGRAM(S): at 05:31

## 2017-07-24 RX ADMIN — Medication 100 MILLIGRAM(S): at 14:12

## 2017-07-24 RX ADMIN — SODIUM CHLORIDE 2000 MILLILITER(S): 9 INJECTION INTRAMUSCULAR; INTRAVENOUS; SUBCUTANEOUS at 00:54

## 2017-07-24 RX ADMIN — SODIUM CHLORIDE 1000 MILLILITER(S): 9 INJECTION INTRAMUSCULAR; INTRAVENOUS; SUBCUTANEOUS at 06:00

## 2017-07-24 RX ADMIN — SODIUM CHLORIDE 50 MILLILITER(S): 9 INJECTION INTRAMUSCULAR; INTRAVENOUS; SUBCUTANEOUS at 02:12

## 2017-07-24 RX ADMIN — Medication 200 GRAM(S): at 06:26

## 2017-07-24 RX ADMIN — SENNA PLUS 2 TABLET(S): 8.6 TABLET ORAL at 21:57

## 2017-07-24 RX ADMIN — SEVELAMER CARBONATE 2400 MILLIGRAM(S): 2400 POWDER, FOR SUSPENSION ORAL at 21:57

## 2017-07-24 RX ADMIN — Medication 200 GRAM(S): at 04:15

## 2017-07-24 RX ADMIN — Medication 100 MILLIGRAM(S): at 13:46

## 2017-07-24 RX ADMIN — INSULIN HUMAN 3 UNIT(S)/HR: 100 INJECTION, SOLUTION SUBCUTANEOUS at 05:05

## 2017-07-24 RX ADMIN — HEPARIN SODIUM 1050 UNIT(S)/HR: 5000 INJECTION INTRAVENOUS; SUBCUTANEOUS at 17:43

## 2017-07-24 RX ADMIN — OXYCODONE AND ACETAMINOPHEN 1 TABLET(S): 5; 325 TABLET ORAL at 16:48

## 2017-07-24 RX ADMIN — SEVELAMER CARBONATE 2400 MILLIGRAM(S): 2400 POWDER, FOR SUSPENSION ORAL at 05:35

## 2017-07-24 RX ADMIN — HEPARIN SODIUM 750 UNIT(S)/HR: 5000 INJECTION INTRAVENOUS; SUBCUTANEOUS at 03:16

## 2017-07-24 NOTE — PROGRESS NOTE ADULT - PROBLEM SELECTOR PLAN 5
- Hyperkalemia on admission cleared by this a.m. suspected 2/2 hypewglycemia, will continue to monitor

## 2017-07-24 NOTE — H&P ADULT - PROBLEM SELECTOR PLAN 4
ESRD on HD via AVF   Renal consult  daily weight   renally dose drugs   avoid nephrotoxins   monitor electrolytes   AVF vascular checks  bladder scan

## 2017-07-24 NOTE — H&P ADULT - PROBLEM SELECTOR PLAN 1
DKA in setting of poor compliance , will also r/o infectious process, insulin pump malfunction   admit to MICU on insulin gtt ; f/ DKA protocol   FS q 1 hr   check a1c, lipid, b-hydra., ketones, cbc, cmp, mg, phos, PT/INR, LFT;s, PAN cx, UA , urin tox  Than labs q 4 hrs ; VBG, BMP, Mg, Phos , ketones   endocrine consult   keep insulin pump off until endo recommendations   c/w abx tx DKA in setting of poor compliance , will also r/o infectious process, insulin pump malfunction   admit to MICU on insulin gtt ; f/ DKA protocol   FS q 1 hr   check a1c, lipid, b-hydra., ketones, cbc, cmp, mg, phos, PT/INR, LFT;s, PAN cx, UA , urin tox  Than labs q 4 hrs ; VBG, BMP, Mg, Phos , ketones   endocrine consult   CXR  keep insulin pump off until endo recommendations   c/w abx tx

## 2017-07-24 NOTE — H&P ADULT - NSHPREVIEWOFSYSTEMS_GEN_ALL_CORE
REVIEW OF SYSTEMS:  Constitutional: [ ] negative [X] fevers [ ] chills [ ] weight loss [ ] weight gain  HEENT: [X] negative [ ] dry eyes [ ] eye irritation [ ] postnasal drip [ ] nasal congestion  CV: [ ] negative  [X] chest pain [ ] orthopnea [ ] palpitations [ ] murmur  Resp: [ X] negative [ ] cough [ ] shortness of breath [ ] dyspnea [ ] wheezing [ ] sputum [ ] hemoptysis  GI: [ ] negative [ ] nausea [ ] vomiting [ ] diarrhea [ ] constipation [X] abd pain [ ] dysphagia   : [X] negative [ ] dysuria [ ] nocturia [ ] hematuria [ ] increased urinary frequency  Musculoskeletal: [X] negative [ ] back pain [ ] myalgias [ ] arthralgias [ ] fracture  Skin: [X] negative [ ] rash [ ] itch  Neurological: [X] negative [ ] headache [ ] dizziness [ ] syncope [ ] weakness [ ] numbness  Psychiatric: [X] negative [ ] anxiety [ ] depression  Endocrine: [X] negative [ ] diabetes [ ] thyroid problem  Hematologic/Lymphatic: [X ] negative [ ] anemia [ ] bleeding problem  Allergic/Immunologic: [ ] negative [ ] itchy eyes [ ] nasal discharge [ ] hives [ ] angioedema

## 2017-07-24 NOTE — H&P ADULT - NSHPLABSRESULTS_GEN_ALL_CORE
Transthoracic Echocardiogram (06.16.17 @ 19:03)   Conclusions:  1. Mild concentric left ventricular hypertrophy.  2. Normal left ventricular systolic function. No segmental  wall motion abnormalities. Septal motion consistent with  right ventricular overload.  3. Moderate diastolic dysfunction (Stage II).  4. Right ventricle not well visualized but TAPSE of 1.6 cm  suggest reduced function.

## 2017-07-24 NOTE — CONSULT NOTE ADULT - SUBJECTIVE AND OBJECTIVE BOX
HPI:  This is 62Fw/ PMHx T1DM on insulin pump with multiple complications and morbid medical conditions, HTN, HLD, former smoker, gout, MI, CAD s/p PCI to RCA and brachytherapy, dCHF, chronic LLE pain and edema in setting of severe PVD s/p L & R fem-pop bypass  graft,  multiple vascular surgery both leg w/ fem-pop bypass revisions , Subclavian vein stenosis, left: s/p stent, on ASA and Plavix,  ESRD on HD (Tu/Thr/Sat) via L AVF, CVA with memory deficit, depression, chronic pain management on oxycodone last RX 6/27/17 ISTOP Ref#[id number], s/p cholecystectomy, multiple prior admissions to hospital for c/o hyperglycemia,  increasing leg edema, and chest pain w/ last admit 6/30-7/6 now,   p/w mid sternal chest pain  and  glucose >900.   States that she has not yet followed up with endocrine since last d/c but has appointment scheduled in upcoming week. Pt had HD yesterday and reports that today that she had watermelon, gave herself a bolus via her insulin pump. However afterwards her sugars were greater than 200’s, and  began to have chest pain, prompting her to present to ED. Pt reports that she has felt febrile over past two days, but denies cough, LIPSCOMB, diaphoresis, pain on exertion, dysuria, hematuria, night sweats, malaise,  nausea, vomiting, chills, diarrhea, abdominal pain, sick contact.  ICU Vital Signs Last 24 Hrs  T(F): 98   HR: 82 - 84  BP: 111/50 - 118/50  RR: 18 - 20  SpO2: 98% - 100%  MEDICATIONS started in ED:  insulin Infusion 6 Unit(s)/Hr (6 mL/Hr) IV Continuous <Continuous>  heparin  Infusion.  Unit(s)/Hr (7 mL/Hr) IV Continuous <Continuous>  morphine  - Injectable 4 milliGRAM(s) IV Push once  sodium chloride 0.9%. 1000 milliLiter(s) (50 mL/Hr) IV Continuous <Continuous>  vancomycin  IVPB 1000 milliGRAM(s) IV Intermittent once  piperacillin/tazobactam IVPB. 3.375 Gram(s) IV Intermittent every 12 hours  morphine  - Injectable 4 milliGRAM(s) IV Push once  sodium chloride 0.9% Bolus 1000 milliLiter(s) IV Bolus once  LABS:                        9.9    10.0  )-----------( 292      ( 23 Jul 2017 18:00 )             32.4     Hgb Trend: 9.9<--  07-23    128<L>  |  78<L>  |  41<H>  ----------------------------<  959<HH>  6.4<HH>   |  17<L>  |  6.43<H>    Ca    8.6      23 Jul 2017 18:00    TPro  7.5  /  Alb  4.4  /  TBili  0.4  /  DBili  x   /  AST  20  /  ALT  13  /  AlkPhos  275<H>  07-23    Creatinine Trend: 6.43<--, 5.82<--, 3.83<--, 6.36<--, 4.22<--, 4.55<--  PT/INR - ( 23 Jul 2017 18:00 )   PT: 11.6 sec;   INR: 1.07 ratio         PTT - ( 23 Jul 2017 18:00 )  PTT:31.1 sec      Venous Blood Gas:  07-23 @ 18:00  7.24/45/25/19/29  VBG Lactate: 2.4 (24 Jul 2017 00:01)      PAST MEDICAL & SURGICAL HISTORY:  Subclavian vein stenosis, left: s/p stent  Cellulitis: 2015 left foot  DKA, type 1: 1/2015  ACS (acute coronary syndrome): 1/2015 - cath revealed 100% ostial stenosis not amenable to PCI - medical management  MI, old  TIA (transient ischemic attack): x 2 - 8-9 years ago prior to ASD/VSD repair  CAD (coronary artery disease): s/p stents  Murmur, cardiac  Gout: past  CVA (cerebral infarction): with no residual, 8 yrs ago, prior to heart surgery - ST memory loss  Peripheral vascular disease: occluded left fem-pop bypass 5/2015  Diabetes mellitus type 1: Insulin Dependent - [employer]  Minimed Paradigm Insulin Pump - Novolog  ESRD (end stage renal disease): dialysis , tue, thursday, saturday  Hyperlipidemia  Status post device closure of ASD: &quot;clamshell&quot;  History of cardiac catheterization: 1/2015 - no intervention  S/P femoral-popliteal bypass surgery: L and R in 2013 with graft; 5/2015 CFA angioplasty left and ileofemoral endarterectomywith vein patch angioplasty of left fem-pop bypass graft  Multiple vascular surgery both leg, left fempop bypass revision 11/2015  AV (arteriovenous fistula): Left AV graft; revision with stent placement 2-3 years ago  S/P cholecystectomy      Review of Systems:   CONSTITUTIONAL: No fever, weight loss, or fatigue  EYES: No eye pain, visual disturbances, or discharge  ENMT:  No difficulty hearing, tinnitus, vertigo; No sinus or throat pain  NECK: No pain or stiffness  BREASTS: No pain, masses, or nipple discharge  RESPIRATORY: No cough, wheezing, chills or hemoptysis; No shortness of breath  CARDIOVASCULAR: No chest pain, palpitations, dizziness, or leg swelling  GASTROINTESTINAL: No abdominal or epigastric pain. No nausea, vomiting, or hematemesis; No diarrhea or constipation. No melena or hematochezia.  GENITOURINARY: No dysuria, frequency, hematuria, or incontinence  NEUROLOGICAL: No headaches, memory loss, loss of strength, numbness, or tremors  SKIN: No itching, burning, rashes, or lesions   LYMPH NODES: No enlarged glands  ENDOCRINE: No heat or cold intolerance; No hair loss  MUSCULOSKELETAL: No joint pain or swelling; No muscle, back, or extremity pain  PSYCHIATRIC: No depression, anxiety, mood swings, or difficulty sleeping  HEME/LYMPH: No easy bruising, or bleeding gums  ALLERY AND IMMUNOLOGIC: No hives or eczema    Allergies    No Known Allergies    Intolerances        Social History:     FAMILY HISTORY:  Family history of smoking  Family history of hypertension  Family history of cancer (Sibling)      MEDICATIONS  (STANDING):  piperacillin/tazobactam IVPB. 3.375 Gram(s) IV Intermittent every 12 hours  DULoxetine 60 milliGRAM(s) Oral daily  atorvastatin 20 milliGRAM(s) Oral at bedtime  clopidogrel Tablet 75 milliGRAM(s) Oral daily  metoprolol succinate ER 50 milliGRAM(s) Oral daily  aspirin enteric coated 81 milliGRAM(s) Oral daily  cinacalcet 60 milliGRAM(s) Oral daily  sevelamer hydrochloride 2400 milliGRAM(s) Oral every 8 hours  hydrALAZINE 100 milliGRAM(s) Oral every 8 hours  dextrose 50% Injectable 50 milliLiter(s) IV Push every 15 minutes  dextrose 50% Injectable 25 milliLiter(s) IV Push every 15 minutes  senna 2 Tablet(s) Oral at bedtime  docusate sodium 100 milliGRAM(s) Oral three times a day  heparin  Infusion.  Unit(s)/Hr (7.5 mL/Hr) IV Continuous <Continuous>  dextrose 5% + sodium chloride 0.9%. 1000 milliLiter(s) (50 mL/Hr) IV Continuous <Continuous>  insulin Infusion 0.5 Unit(s)/Hr (0.5 mL/Hr) IV Continuous <Continuous>    MEDICATIONS  (PRN):  oxyCODONE    5 mG/acetaminophen 325 mG 1 Tablet(s) Oral every 6 hours PRN Severe Pain (7 - 10)  acetaminophen   Tablet. 650 milliGRAM(s) Oral every 6 hours PRN Mild and/or moderate Pain        CAPILLARY BLOOD GLUCOSE  107 (24 Jul 2017 20:00)  109 (24 Jul 2017 19:00)  119 (24 Jul 2017 18:00)  133 (24 Jul 2017 17:00)  164 (24 Jul 2017 16:00)  214 (24 Jul 2017 15:00)  261 (24 Jul 2017 14:00)  228 (24 Jul 2017 13:00)  216 (24 Jul 2017 12:00)  201 (24 Jul 2017 11:00)  185 (24 Jul 2017 10:00)  179 (24 Jul 2017 09:00)  123 (24 Jul 2017 08:00)  138 (24 Jul 2017 07:00)  147 (24 Jul 2017 06:00)  235 (24 Jul 2017 05:00)  299 (24 Jul 2017 04:00)  376 (24 Jul 2017 03:00)  494 (24 Jul 2017 02:00)  508 (24 Jul 2017 01:08)        I&O's Summary    23 Jul 2017 07:01  -  24 Jul 2017 07:00  --------------------------------------------------------  IN: 849.5 mL / OUT: 0 mL / NET: 849.5 mL    24 Jul 2017 07:01  -  24 Jul 2017 20:54  --------------------------------------------------------  IN: 945 mL / OUT: 0 mL / NET: 945 mL        PHYSICAL EXAM:  GENERAL: NAD,   HEAD:  Atraumatic, Normocephalic  EYES: EOMI, PERRLA, conjunctiva and sclera clear  NECK: Supple, No JVD  CHEST/LUNG: Clear to auscultation bilaterally; No wheeze  HEART: Regular rate and rhythm; No murmurs, rubs, or gallops  ABDOMEN: Soft, Nontender, Nondistended; Bowel sounds present  EXTREMITIES:  2+ Peripheral Pulses, No clubbing, cyanosis, left leg 1+ edema  NEUROLOGY: non-focal  SKIN: No rashes or lesions    LABS:                        8.3    8.2   )-----------( 228      ( 24 Jul 2017 09:52 )             24.0     07-24    133<L>  |  88<L>  |  47<H>  ----------------------------<  117<H>  4.6   |  25  |  7.40<H>    Ca    8.3<L>      24 Jul 2017 18:18  Phos  4.4     07-24  Mg     2.5     07-24    TPro  6.7  /  Alb  3.9  /  TBili  0.3  /  DBili  x   /  AST  25  /  ALT  12  /  AlkPhos  248<H>  07-24    PT/INR - ( 24 Jul 2017 01:54 )   PT: 11.7 sec;   INR: 1.08 ratio         PTT - ( 24 Jul 2017 16:36 )  PTT:42.5 sec  CARDIAC MARKERS ( 24 Jul 2017 16:36 )  x     / 1.28 ng/mL / 187 U/L / x     / 13.5 ng/mL  CARDIAC MARKERS ( 24 Jul 2017 09:52 )  x     / 1.23 ng/mL / 212 U/L / x     / 16.1 ng/mL  CARDIAC MARKERS ( 23 Jul 2017 23:46 )  x     / 0.43 ng/mL / 228 U/L / x     / 13.0 ng/mL  CARDIAC MARKERS ( 23 Jul 2017 18:00 )  x     / 0.10 ng/mL / 175 U/L / x     / 4.1 ng/mL          RADIOLOGY & ADDITIONAL TESTS:    Imaging Personally Reviewed:    Consultant(s) Notes Reviewed:      Care Discussed with Consultants/Other Providers:

## 2017-07-24 NOTE — PROGRESS NOTE ADULT - SUBJECTIVE AND OBJECTIVE BOX
CHIEF COMPLAINT:    Interval Events:    REVIEW OF SYSTEMS:  Constitutional: [ ] negative [ ] fevers [ ] chills [ ] weight loss [ ] weight gain  HEENT: [ ] negative [ ] dry eyes [ ] eye irritation [ ] postnasal drip [ ] nasal congestion  CV: [ ] negative  [ ] chest pain [ ] orthopnea [ ] palpitations [ ] murmur  Resp: [ ] negative [ ] cough [ ] shortness of breath [ ] dyspnea [ ] wheezing [ ] sputum [ ] hemoptysis  GI: [ ] negative [ ] nausea [ ] vomiting [ ] diarrhea [ ] constipation [ ] abd pain [ ] dysphagia   : [ ] negative [ ] dysuria [ ] nocturia [ ] hematuria [ ] increased urinary frequency  Musculoskeletal: [ ] negative [ ] back pain [ ] myalgias [ ] arthralgias [ ] fracture  Skin: [ ] negative [ ] rash [ ] itch  Neurological: [ ] negative [ ] headache [ ] dizziness [ ] syncope [ ] weakness [ ] numbness  Psychiatric: [ ] negative [ ] anxiety [ ] depression  Endocrine: [ ] negative [ ] diabetes [ ] thyroid problem  Hematologic/Lymphatic: [ ] negative [ ] anemia [ ] bleeding problem  Allergic/Immunologic: [ ] negative [ ] itchy eyes [ ] nasal discharge [ ] hives [ ] angioedema  [ ] All other systems negative  [ ] Unable to assess ROS because ________    OBJECTIVE:  ICU Vital Signs Last 24 Hrs  T(C): 36.9 (24 Jul 2017 04:00), Max: 36.9 (24 Jul 2017 04:00)  T(F): 98.5 (24 Jul 2017 04:00), Max: 98.5 (24 Jul 2017 04:00)  HR: 80 (24 Jul 2017 06:00) (80 - 107)  BP: 115/56 (24 Jul 2017 02:00) (111/50 - 146/57)  BP(mean): 80 (24 Jul 2017 02:00) (80 - 88)  ABP: 112/42 (24 Jul 2017 06:00) (112/42 - 144/40)  ABP(mean): 67 (24 Jul 2017 06:00) (67 - 81)  RR: 26 (24 Jul 2017 06:00) (14 - 32)  SpO2: 100% (24 Jul 2017 06:00) (98% - 100%)        07-23 @ 07:01  -  07-24 @ 07:00  --------------------------------------------------------  IN: 849.5 mL / OUT: 0 mL / NET: 849.5 mL      CAPILLARY BLOOD GLUCOSE  138 (24 Jul 2017 07:00)          PHYSICAL EXAM:  General:   HEENT:   Lymph Nodes:  Neck:   Respiratory:   Cardiovascular:   Abdomen:   Extremities:   Skin:   Neurological:  Psychiatry:    LINES:    HOSPITAL MEDICATIONS:  clopidogrel Tablet 75 milliGRAM(s) Oral daily  aspirin enteric coated 81 milliGRAM(s) Oral daily  heparin  Infusion.  Unit(s)/Hr IV Continuous <Continuous>    piperacillin/tazobactam IVPB. 3.375 Gram(s) IV Intermittent every 12 hours    metoprolol succinate ER 50 milliGRAM(s) Oral daily  hydrALAZINE 100 milliGRAM(s) Oral every 8 hours  norepinephrine Infusion 0.01 MICROgram(s)/kG/Min IV Continuous <Continuous>    atorvastatin 20 milliGRAM(s) Oral at bedtime  dextrose 50% Injectable 50 milliLiter(s) IV Push every 15 minutes  dextrose 50% Injectable 25 milliLiter(s) IV Push every 15 minutes  insulin Infusion 1 Unit(s)/Hr IV Continuous <Continuous>      DULoxetine 60 milliGRAM(s) Oral daily    senna 2 Tablet(s) Oral at bedtime  docusate sodium 100 milliGRAM(s) Oral three times a day        dextrose 5% + sodium chloride 0.45%. 1000 milliLiter(s) IV Continuous <Continuous>        cinacalcet 60 milliGRAM(s) Oral daily  sevelamer hydrochloride 2400 milliGRAM(s) Oral every 8 hours          LABS:                        8.4    8.4   )-----------( 238      ( 24 Jul 2017 01:54 )             24.8     Hgb Trend: 8.4<--, 9.9<--  07-24    135  |  90<L>  |  46<H>  ----------------------------<  146<H>  3.9   |  24  |  6.98<H>    Ca    8.4      24 Jul 2017 06:12  Phos  3.9     07-24  Mg     2.4     07-24    TPro  6.7  /  Alb  3.9  /  TBili  0.3  /  DBili  x   /  AST  25  /  ALT  12  /  AlkPhos  248<H>  07-24    Creatinine Trend: 6.98<--, 6.96<--, 6.75<--, 6.31<--, 6.26<--, 6.43<--  PT/INR - ( 24 Jul 2017 01:54 )   PT: 11.7 sec;   INR: 1.08 ratio         PTT - ( 24 Jul 2017 01:54 )  PTT:36.4 sec    Arterial Blood Gas:  07-24 @ 06:04  7.39/46/173/28/100/2.8  ABG lactate: --  Arterial Blood Gas:  07-24 @ 02:52  7.39/46/134/27/100/2.3  ABG lactate: --    Venous Blood Gas:  07-24 @ 01:53  7.40/42/117/25/99  VBG Lactate: 3.7  Venous Blood Gas:  07-23 @ 18:00  7.24/45/25/19/29  VBG Lactate: 2.4      MICROBIOLOGY:     RADIOLOGY:  [ ] Reviewed and interpreted by me    EKG: CHIEF COMPLAINT: DKA, chest pain    Interval Events:    No acute events overnight. No complaints this a.m. No further chest pain.    REVIEW OF SYSTEMS:  Constitutional: [X] negative [ ] fevers [ ] chills [ ] weight loss [ ] weight gain  HEENT: [X] negative [ ] dry eyes [ ] eye irritation [ ] postnasal drip [ ] nasal congestion  CV: [X] negative  [ ] chest pain [ ] orthopnea [ ] palpitations [ ] murmur  Resp: [X] negative [ ] cough [ ] shortness of breath [ ] dyspnea [ ] wheezing [ ] sputum [ ] hemoptysis  GI: [X] negative [ ] nausea [ ] vomiting [ ] diarrhea [ ] constipation [ ] abd pain [ ] dysphagia   : [X] negative [ ] dysuria [ ] nocturia [ ] hematuria [ ] increased urinary frequency  Musculoskeletal: [X] negative [ ] back pain [ ] myalgias [ ] arthralgias [ ] fracture  Skin: [X] negative [ ] rash [ ] itch  Neurological: [X] negative [ ] headache [ ] dizziness [ ] syncope [ ] weakness [ ] numbness  Psychiatric: [X] negative [ ] anxiety [ ] depression  Endocrine: [] negative [X] diabetes [ ] thyroid problem  Hematologic/Lymphatic: [X] negative [ ] anemia [ ] bleeding problem  Allergic/Immunologic: [X] negative [ ] itchy eyes [ ] nasal discharge [ ] hives [ ] angioedema  [ ] All other systems negative  [ ] Unable to assess ROS because ________    OBJECTIVE:  ICU Vital Signs Last 24 Hrs  T(C): 36.9 (24 Jul 2017 04:00), Max: 36.9 (24 Jul 2017 04:00)  T(F): 98.5 (24 Jul 2017 04:00), Max: 98.5 (24 Jul 2017 04:00)  HR: 80 (24 Jul 2017 06:00) (80 - 107)  BP: 115/56 (24 Jul 2017 02:00) (111/50 - 146/57)  BP(mean): 80 (24 Jul 2017 02:00) (80 - 88)  ABP: 112/42 (24 Jul 2017 06:00) (112/42 - 144/40)  ABP(mean): 67 (24 Jul 2017 06:00) (67 - 81)  RR: 26 (24 Jul 2017 06:00) (14 - 32)  SpO2: 100% (24 Jul 2017 06:00) (98% - 100%)        07-23 @ 07:01  -  07-24 @ 07:00  --------------------------------------------------------  IN: 849.5 mL / OUT: 0 mL / NET: 849.5 mL      CAPILLARY BLOOD GLUCOSE  138 (24 Jul 2017 07:00)          PHYSICAL EXAM:  General: NAD  HEENT: EOMI, PERRLA  Neck: Supple, no JVD  Respiratory: CTA b/l No WRR  Cardiovascular: RRR s1/s2 No MGR  Abdomen: S/NT/ND BS+  Extremities: No clubbing, nocyanosis  Skin: warm, dry  Neurological: non-focal  Psychiatry: AOX3    LINES:    HOSPITAL MEDICATIONS:  clopidogrel Tablet 75 milliGRAM(s) Oral daily  aspirin enteric coated 81 milliGRAM(s) Oral daily  heparin  Infusion.  Unit(s)/Hr IV Continuous <Continuous>    piperacillin/tazobactam IVPB. 3.375 Gram(s) IV Intermittent every 12 hours    metoprolol succinate ER 50 milliGRAM(s) Oral daily  hydrALAZINE 100 milliGRAM(s) Oral every 8 hours  norepinephrine Infusion 0.01 MICROgram(s)/kG/Min IV Continuous <Continuous>    atorvastatin 20 milliGRAM(s) Oral at bedtime  dextrose 50% Injectable 50 milliLiter(s) IV Push every 15 minutes  dextrose 50% Injectable 25 milliLiter(s) IV Push every 15 minutes  insulin Infusion 1 Unit(s)/Hr IV Continuous <Continuous>      DULoxetine 60 milliGRAM(s) Oral daily    senna 2 Tablet(s) Oral at bedtime  docusate sodium 100 milliGRAM(s) Oral three times a day        dextrose 5% + sodium chloride 0.45%. 1000 milliLiter(s) IV Continuous <Continuous>        cinacalcet 60 milliGRAM(s) Oral daily  sevelamer hydrochloride 2400 milliGRAM(s) Oral every 8 hours          LABS:                        8.4    8.4   )-----------( 238      ( 24 Jul 2017 01:54 )             24.8     Hgb Trend: 8.4<--, 9.9<--  07-24    135  |  90<L>  |  46<H>  ----------------------------<  146<H>  3.9   |  24  |  6.98<H>    Ca    8.4      24 Jul 2017 06:12  Phos  3.9     07-24  Mg     2.4     07-24    TPro  6.7  /  Alb  3.9  /  TBili  0.3  /  DBili  x   /  AST  25  /  ALT  12  /  AlkPhos  248<H>  07-24    Creatinine Trend: 6.98<--, 6.96<--, 6.75<--, 6.31<--, 6.26<--, 6.43<--  PT/INR - ( 24 Jul 2017 01:54 )   PT: 11.7 sec;   INR: 1.08 ratio         PTT - ( 24 Jul 2017 01:54 )  PTT:36.4 sec    Arterial Blood Gas:  07-24 @ 06:04  7.39/46/173/28/100/2.8  ABG lactate: --  Arterial Blood Gas:  07-24 @ 02:52  7.39/46/134/27/100/2.3  ABG lactate: --    Venous Blood Gas:  07-24 @ 01:53  7.40/42/117/25/99  VBG Lactate: 3.7  Venous Blood Gas:  07-23 @ 18:00  7.24/45/25/19/29  VBG Lactate: 2.4      MICROBIOLOGY:     RADIOLOGY:  [ ] Reviewed and interpreted by me    EKG:

## 2017-07-24 NOTE — H&P ADULT - NSHPPHYSICALEXAM_GEN_ALL_CORE
PHYSICAL EXAM:  General: NAD  HEENT: EOMI, PERRLA  Lymph Nodes: NAD  Neck: Supple, no jvd  Respiratory: CTA b/l No WRR  Cardiovascular: RRR S1/S2 No MGR. Left brachial AVF  Abdomen: Suprapubic tenderness  Extremities: AROM, chronic LLE edema   Skin: Warm, dry.   Neurological: non-focal  Psychiatry: AOX3 PHYSICAL EXAM:  General: NAD  HEENT: EOMI, PERRLA  Lymph Nodes: NAD  Neck: Supple, no jvd  Respiratory: CTA b/l No WRR  Cardiovascular: RRR S1/S2 No MGR. Left brachial AVF  Abdomen: Suprapubic tenderness  Extremities: AROM, chronic LLE edema , b/l + 1 DP/PT   Skin: Warm, dry.   Neurological: non-focal  Psychiatry: AOX3 PHYSICAL EXAM:  General: NAD  HEENT: EOMI, PERRLA  Lymph Nodes: NAD  Neck: Supple, no jvd  Respiratory: CTA b/l No WRR  Cardiovascular: RRR S1/S2 No MGR. Left brachial AVF  Abdomen: Suprapubic tenderness  Extremities: AROM, chronic LLE edema , b/l + 1 DP/PT , AVF +thrill/bruit   Skin: Warm, dry.   Neurological: non-focal  Psychiatry: AOX3

## 2017-07-24 NOTE — CONSULT NOTE ADULT - PROBLEM SELECTOR RECOMMENDATION 3
-statin therapy
- Cards consulted in ED, unlikely cardiac in etiology per ED report, will f/u official cardiology recs  - monitor on telemetry and repeat CE Q6H

## 2017-07-24 NOTE — CONSULT NOTE ADULT - SUBJECTIVE AND OBJECTIVE BOX
[facility]  [phone]  NEPHROLOGY      INITIAL CONSULT NOTE  --------------------------------------------------------------------------------  HPI:    61 yo f admitted with hyperglycemia/DKA. glucose greater than 900 mg/dl. pt also hyperkalemic and required calcium gluconate. renal consult called  last hd 2 days ago    PAST HISTORY  --------------------------------------------------------------------------------  PAST MEDICAL & SURGICAL HISTORY:  Atrial fibrillation, unspecified type  HTN (hypertension)  Personal history of PE (pulmonary embolism)  H/O cardiac radiofrequency ablation    FAMILY HISTORY:  No pertinent family history in first degree relatives of DM or ckd    PAST SOCIAL HISTORY: denies current tobacco use. +     ALLERGIES & MEDICATIONS  --------------------------------------------------------------------------------  Allergies    penicillins (Other (Mild to Mod))    Intolerances      Standing Inpatient Medications  clopidogrel Tablet 75 milliGRAM(s) Oral daily  atorvastatin 40 milliGRAM(s) Oral at bedtime  mirabegron ER 50 milliGRAM(s) Oral <User Schedule>  latanoprost 0.005% Ophthalmic Solution 1 Drop(s) Right EYE at bedtime  docusate sodium 100 milliGRAM(s) Oral two times a day  sodium chloride 0.9%. 1000 milliLiter(s) IV Continuous <Continuous>    PRN Inpatient Medications  acetaminophen   Tablet. 650 milliGRAM(s) Oral every 6 hours PRN      REVIEW OF SYSTEMS  --------------------------------------------------------------------------------  Gen: +  fatigue, -  fevers/chills,   Skin: No rashes  Head/Eyes/Ears/Mouth: No headache; Normal hearing; decrease vision +  Respiratory: No dyspnea, cough, wheezing, hemoptysis  CV:had  chest pain, PND,  GI: No abdominal pain, diarrhea, constipation,  had nausea,  had vomiting,   : No increased frequency, dysuria,   MSK: No joint pain/swelling; no back pain; no edema  Neuro: +dizziness/lightheadedness,- seizures,  Psych: No significant nervousness, anxiety, stress, depression  All other systems were reviewed and are negative, except as noted.    VITALS/PHYSICAL EXAM  --------------------------------------------------------------------------------  T(C): 36.6 (07-24-17 @ 04:00), Max: 36.6 (07-24-17 @ 04:00)  HR: 79 (07-24-17 @ 13:13) (64 - 79)  BP: 152/68 (07-24-17 @ 13:13) (103/58 - 152/68)  RR: 18 (07-24-17 @ 04:00) (18 - 18)  SpO2: 96% (07-24-17 @ 05:34) (94% - 96%)  Wt(kg): --        07-23-17 @ 07:01  -  07-24-17 @ 07:00  --------------------------------------------------------  IN: 240 mL / OUT: 600 mL / NET: -360 mL    07-24-17 @ 07:01  -  07-24-17 @ 17:00  --------------------------------------------------------  IN: 120 mL / OUT: 150 mL / NET: -30 mL      Physical Exam:  	Gen: ill appearing chronically. awake and responsive.   	no jvd supple neck,   	Pulm: decrease bs no rales or ronchi   	CV: RRR, S1S2; no rub  	Back: No spinal or CVA tenderness; no sacral edema  	Abd: +BS, soft, nontender/nondistended  	: No suprapubic tenderness  	LE: Warm, no clubbing,2+ LLE edema  	Psych: Normal affect and mood  	Skin: Warm, without rashes  	Vascular access: avf + bruit    LABS/STUDIES  --------------------------------------------------------------------------------              14.8   7.1   >-----------<  205      [07-24-17 @ 06:23]              44.8     137  |  98  |  54  ----------------------------<  126      [07-24-17 @ 06:23]  4.1   |  25  |  1.62        Ca     8.0     [07-24-17 @ 06:23]      Mg     2.1     [07-23-17 @ 06:44]      Phos  4.1     [07-23-17 @ 06:44]      PT/INR: PT 33.6 , INR 3.04       [07-24-17 @ 06:23]      Creatinine Trend:  SCr 1.62 [07-24 @ 06:23]  SCr 1.48 [07-23 @ 06:44]  SCr 1.21 [07-22 @ 20:21]  SCr 1.14 [07-21 @ 08:08]  SCr 1.25 [07-21 @ 00:15]    Urinalysis - [07-21-17 @ 20:30]      Color Red / Appearance Turbid / SG 1.023 / pH 6.5      Gluc Negative / Ketone Trace  / Bili Negative / Urobili Negative       Blood Large / Protein 500 / Leuk Est Moderate / Nitrite Negative      RBC >50 / WBC 25-50 / Hyaline  / Gran  / Sq Epi  / Non Sq Epi  / Bacteria

## 2017-07-24 NOTE — PROGRESS NOTE ADULT - PROBLEM SELECTOR PLAN 1
- DKA with hyperglycemia on admission, gap improvign as of this a.m.  - will c/w insulin gtt, fingersticks Q1H, BMP Q4H  - Will follow up endo recs

## 2017-07-24 NOTE — H&P ADULT - NSHPSOCIALHISTORY_GEN_ALL_CORE
SOCIAL HISTORY:  Smoking: [ ] Never Smoked [X] Former Smoker (__ packs x ___ years) [ ] Current Smoker  (__ packs x ___ years)  Substance Use: [X] Never Used [ ] Used ____  EtOH Use: Denies  Marital Status: [ ] Single [X]  [ ]  [ ]   Sexual History: Monogamous with spouse  Occupation: Retired

## 2017-07-24 NOTE — H&P ADULT - ATTENDING COMMENTS
This is 62Fw/ PMHx as detailed prior including   T1DM on insulin pump with multiple complications and comorbid medical conditions due to noncompliance- MI, CAD s/p PCI to RCA and brachytherapy, dCHF, chronic LLE pain and edema in setting of severe PVD s/p L & R fem-pop bypass  graft,  multiple vascular surgery both leg w/ fem-pop bypass revisions , Subclavian vein stenosis, left: s/p stent, on ASA and Plavix,  ESRD , CVA with memory deficit, depression, chronic pain management on oxycodone last RX 6/27/17 ISTOP Ref#[id number] . Patient with  multiple prior admissions to hospital for  hyperglycemia,  increasing leg edema, and chest pain w/ last admit 6/30-7/6 now, once again   p/w mid sternal chest pain  and  glucose >900.  Patient currently upgraded to the MICU with NSTEMI/ demand ischemia, chest pain, hyperosmolar ketotic state, severe dehydration coupled with multiple metabolic derangements ( hypo-osmolar hyponatremia, hyperkalemia and hypochloremia). Care and treatment as detailed above. Case discussed extensively with patient, , staff, team and specialist on board. Importance of medical and dietary compliance have been addressed.

## 2017-07-24 NOTE — H&P ADULT - PROBLEM SELECTOR PLAN 2
r/o ACS   trend CE   serial EKG  telemetry   c/w heparin gtt   PTT, CBC q 6   bleeding precaution   type and screen  cardiology consult r/o ACS   trend CE   serial EKG  telemetry   c/w heparin gtt   PTT, CBC q 6   bleeding precaution   type and screen  cardiology consult  c/w ASA & Plavix

## 2017-07-24 NOTE — H&P ADULT - HISTORY OF PRESENT ILLNESS
This is 62Fw/ PMHx T1DM on insulin pump with multiple complications and morbid medical conditions, HTN, HLD, former smoker, gout, MI, CAD s/p PCI to RCA and brachytherapy, dCHF, chronic LLE pain and edema in setting of severe PVD s/p L & R fem-pop bypass  graft,  multiple vascular surgery both leg w/ fem-pop bypass revisions , Subclavian vein stenosis, left: s/p stent, on ASA and Plavix,  ESRD on HD (Tu/Thr/Sat) via L AVF, CVA with memory deficit, depression, chronic pain management on oxycodone last RX 6/27/17 ISTOP Ref#[id number], s/p cholecystectomy, multiple prior admissions to hospital for c/o hyperglycemia,  increasing leg edema, and chest pain w/ last admit 6/30-7/6 now,   p/w chest pain  and HI glucose reads at home.   States that she has not yet followed up with endocrine since last d/c but has appointment scheduled in upcoming week. Pt had HD yesterday and reports that today that she had watermelon, gave herself a bolus via her insulin pump. However afterwards her sugars were greater than 200’s, and  began to have chest pain, prompting her to present to ED. Pt reports that she has felt febrile over past two days, but denies cough, LIPSCOMB, diaphoresis, pain on exertion, dysuria, hematuria, night sweats, malaise,  nausea, vomiting, chills, diarrhea, abdominal pain, sick contact.  ICU Vital Signs Last 24 Hrs  T(F): 98   HR: 82 - 84  BP: 111/50 - 118/50  RR: 18 - 20  SpO2: 98% - 100%  MEDICATIONS started in ED:  insulin Infusion 6 Unit(s)/Hr (6 mL/Hr) IV Continuous <Continuous>  heparin  Infusion.  Unit(s)/Hr (7 mL/Hr) IV Continuous <Continuous>  morphine  - Injectable 4 milliGRAM(s) IV Push once  sodium chloride 0.9%. 1000 milliLiter(s) (50 mL/Hr) IV Continuous <Continuous>  vancomycin  IVPB 1000 milliGRAM(s) IV Intermittent once  piperacillin/tazobactam IVPB. 3.375 Gram(s) IV Intermittent every 12 hours  morphine  - Injectable 4 milliGRAM(s) IV Push once  sodium chloride 0.9% Bolus 1000 milliLiter(s) IV Bolus once  LABS:                        9.9    10.0  )-----------( 292      ( 23 Jul 2017 18:00 )             32.4     Hgb Trend: 9.9<--  07-23    128<L>  |  78<L>  |  41<H>  ----------------------------<  959<HH>  6.4<HH>   |  17<L>  |  6.43<H>    Ca    8.6      23 Jul 2017 18:00    TPro  7.5  /  Alb  4.4  /  TBili  0.4  /  DBili  x   /  AST  20  /  ALT  13  /  AlkPhos  275<H>  07-23    Creatinine Trend: 6.43<--, 5.82<--, 3.83<--, 6.36<--, 4.22<--, 4.55<--  PT/INR - ( 23 Jul 2017 18:00 )   PT: 11.6 sec;   INR: 1.07 ratio         PTT - ( 23 Jul 2017 18:00 )  PTT:31.1 sec      Venous Blood Gas:  07-23 @ 18:00  7.24/45/25/19/29  VBG Lactate: 2.4 This is 62Fw/ PMHx T1DM on insulin pump with multiple complications and morbid medical conditions, HTN, HLD, former smoker, gout, MI, CAD s/p PCI to RCA and brachytherapy, dCHF, chronic LLE pain and edema in setting of severe PVD s/p L & R fem-pop bypass  graft,  multiple vascular surgery both leg w/ fem-pop bypass revisions , Subclavian vein stenosis, left: s/p stent, on ASA and Plavix,  ESRD on HD (Tu/Thr/Sat) via L AVF, CVA with memory deficit, depression, chronic pain management on oxycodone last RX 6/27/17 ISTOP Ref#[id number], s/p cholecystectomy, multiple prior admissions to hospital for c/o hyperglycemia,  increasing leg edema, and chest pain w/ last admit 6/30-7/6 now,   p/w mid sternal chest pain  and  glucose >900.   States that she has not yet followed up with endocrine since last d/c but has appointment scheduled in upcoming week. Pt had HD yesterday and reports that today that she had watermelon, gave herself a bolus via her insulin pump. However afterwards her sugars were greater than 200’s, and  began to have chest pain, prompting her to present to ED. Pt reports that she has felt febrile over past two days, but denies cough, LIPSCOMB, diaphoresis, pain on exertion, dysuria, hematuria, night sweats, malaise,  nausea, vomiting, chills, diarrhea, abdominal pain, sick contact.  ICU Vital Signs Last 24 Hrs  T(F): 98   HR: 82 - 84  BP: 111/50 - 118/50  RR: 18 - 20  SpO2: 98% - 100%  MEDICATIONS started in ED:  insulin Infusion 6 Unit(s)/Hr (6 mL/Hr) IV Continuous <Continuous>  heparin  Infusion.  Unit(s)/Hr (7 mL/Hr) IV Continuous <Continuous>  morphine  - Injectable 4 milliGRAM(s) IV Push once  sodium chloride 0.9%. 1000 milliLiter(s) (50 mL/Hr) IV Continuous <Continuous>  vancomycin  IVPB 1000 milliGRAM(s) IV Intermittent once  piperacillin/tazobactam IVPB. 3.375 Gram(s) IV Intermittent every 12 hours  morphine  - Injectable 4 milliGRAM(s) IV Push once  sodium chloride 0.9% Bolus 1000 milliLiter(s) IV Bolus once  LABS:                        9.9    10.0  )-----------( 292      ( 23 Jul 2017 18:00 )             32.4     Hgb Trend: 9.9<--  07-23    128<L>  |  78<L>  |  41<H>  ----------------------------<  959<HH>  6.4<HH>   |  17<L>  |  6.43<H>    Ca    8.6      23 Jul 2017 18:00    TPro  7.5  /  Alb  4.4  /  TBili  0.4  /  DBili  x   /  AST  20  /  ALT  13  /  AlkPhos  275<H>  07-23    Creatinine Trend: 6.43<--, 5.82<--, 3.83<--, 6.36<--, 4.22<--, 4.55<--  PT/INR - ( 23 Jul 2017 18:00 )   PT: 11.6 sec;   INR: 1.07 ratio         PTT - ( 23 Jul 2017 18:00 )  PTT:31.1 sec      Venous Blood Gas:  07-23 @ 18:00  7.24/45/25/19/29  VBG Lactate: 2.4

## 2017-07-24 NOTE — CONSULT NOTE ADULT - SUBJECTIVE AND OBJECTIVE BOX
HPI: 61 yo female with hx of T1DM with pvd s/p fem-pop b/l, retinopathy, CAD, ESRD on HD, and neuropathy here with DKA and acute cp x 2 hours. The pain was L sided and radiating to her L arm and back associated with sob and blurred vision, no dizzyness or palpitaions. Pt follows    MEDICATIONS started in ED:  insulin Infusion 6 Unit(s)/Hr (6 mL/Hr) IV Continuous <Continuous>  heparin  Infusion.  Unit(s)/Hr (7 mL/Hr) IV Continuous <Continuous>  morphine  - Injectable 4 milliGRAM(s) IV Push once  sodium chloride 0.9%. 1000 milliLiter(s) (50 mL/Hr) IV Continuous <Continuous>  vancomycin  IVPB 1000 milliGRAM(s) IV Intermittent once  piperacillin/tazobactam IVPB. 3.375 Gram(s) IV Intermittent every 12 hours  morphine  - Injectable 4 milliGRAM(s) IV Push once  sodium chloride 0.9% Bolus 1000 milliLiter(s) IV Bolus once  LABS:                        9.9    10.0  )-----------( 292      ( 23 Jul 2017 18:00 )             32.4     Hgb Trend: 9.9<--  07-23    128<L>  |  78<L>  |  41<H>  ----------------------------<  959<HH>  6.4<HH>   |  17<L>  |  6.43<H>    Ca    8.6      23 Jul 2017 18:00    TPro  7.5  /  Alb  4.4  /  TBili  0.4  /  DBili  x   /  AST  20  /  ALT  13  /  AlkPhos  275<H>  07-23    Creatinine Trend: 6.43<--, 5.82<--, 3.83<--, 6.36<--, 4.22<--, 4.55<--  PT/INR - ( 23 Jul 2017 18:00 )   PT: 11.6 sec;   INR: 1.07 ratio         PTT - ( 23 Jul 2017 18:00 )  PTT:31.1 sec      Venous Blood Gas:  07-23 @ 18:00  7.24/45/25/19/29  VBG Lactate: 2.4 (24 Jul 2017 00:01)      PAST MEDICAL & SURGICAL HISTORY:  Subclavian vein stenosis, left: s/p stent  Cellulitis: 2015 left foot  DKA, type 1: 1/2015  ACS (acute coronary syndrome): 1/2015 - cath revealed 100% ostial stenosis not amenable to PCI - medical management  MI, old  TIA (transient ischemic attack): x 2 - 8-9 years ago prior to ASD/VSD repair  HTN (hypertension)  CAD (coronary artery disease): s/p stents  Murmur, cardiac  Gout: past  CVA (cerebral infarction): with no residual, 8 yrs ago, prior to heart surgery - ST memory loss  Peripheral vascular disease: occluded left fem-pop bypass 5/2015  Diabetes mellitus type 1: Insulin Dependent - Medtr[employer]  Minimed Paradigm Insulin Pump - Novolog  ESRD (end stage renal disease): dialysis , tue, thursday, saturday  Hyperlipidemia  Status post device closure of ASD: &quot;clamshell&quot;  History of cardiac catheterization: 1/2015 - no intervention  S/P femoral-popliteal bypass surgery: L and R in 2013 with graft; 5/2015 CFA angioplasty left and ileofemoral endarterectomywith vein patch angioplasty of left fem-pop bypass graft  Multiple vascular surgery both leg, left fempop bypass revision 11/2015  AV (arteriovenous fistula): Left AV graft; revision with stent placement 2-3 years ago  S/P cholecystectomy      FAMILY HISTORY:  Family history of smoking  Family history of hypertension  Family history of cancer (Sibling)      Social History:  Tobacco  ETOH  Illicits  Occupation    Outpatient Medications:    MEDICATIONS  (STANDING):  piperacillin/tazobactam IVPB. 3.375 Gram(s) IV Intermittent every 12 hours  DULoxetine 60 milliGRAM(s) Oral daily  atorvastatin 20 milliGRAM(s) Oral at bedtime  clopidogrel Tablet 75 milliGRAM(s) Oral daily  metoprolol succinate ER 50 milliGRAM(s) Oral daily  aspirin enteric coated 81 milliGRAM(s) Oral daily  cinacalcet 60 milliGRAM(s) Oral daily  sevelamer hydrochloride 2400 milliGRAM(s) Oral every 8 hours  hydrALAZINE 100 milliGRAM(s) Oral every 8 hours  dextrose 50% Injectable 50 milliLiter(s) IV Push every 15 minutes  dextrose 50% Injectable 25 milliLiter(s) IV Push every 15 minutes  senna 2 Tablet(s) Oral at bedtime  docusate sodium 100 milliGRAM(s) Oral three times a day  heparin  Infusion.  Unit(s)/Hr (7.5 mL/Hr) IV Continuous <Continuous>  insulin Infusion 1 Unit(s)/Hr (1 mL/Hr) IV Continuous <Continuous>  dextrose 5% + sodium chloride 0.9%. 1000 milliLiter(s) (30 mL/Hr) IV Continuous <Continuous>    MEDICATIONS  (PRN):      Allergies    No Known Allergies    Intolerances      Review of Systems:  Constitutional: No fever, good appetite/po intake  Eyes: No blurry vision, diplopia  Neuro: No tremors  HEENT: No pain  Cardiovascular: No chest pain, palpitations  Respiratory: No SOB, no cough  GI: No nausea, vomiting,   : No dysuria, hematuria  Skin: no rash  Psych: no depression  Endocrine: no polyuria, polydipsia  Hem/lymph: no swelling  Osteoporosis: no fractures    ALL OTHER SYSTEMS REVIEWED AND NEGATIVE        PHYSICAL EXAM:  VITALS: T(C): 36.9 (07-24-17 @ 12:00)  T(F): 98.4 (07-24-17 @ 12:00), Max: 98.5 (07-24-17 @ 04:00)  HR: 75 (07-24-17 @ 13:00) (75 - 107)  BP: 115/56 (07-24-17 @ 02:00) (111/50 - 146/57)  RR:  (14 - 32)  SpO2:  (98% - 100%)  Wt(kg): --  GENERAL: NAD, well-groomed, well-developed  EYES: No proptosis, no lid lag, anicteric  HEENT:  Atraumatic, Normocephalic, moist mucous membranes  THYROID: Normal size, no palpable nodules  RESPIRATORY: Clear to auscultation bilaterally; No rales, rhonchi, wheezing, or rubs  CARDIOVASCULAR: Regular rate and rhythm; No murmurs; no peripheral edema  GI: Soft, nontender, non distended, normal bowel sounds  SKIN: Dry, intact, No rashes or lesions  NEURO: sensation intact, extraocular movements intact, no tremor, normal reflexes  PSYCH: reactive affect, euthymic mood  CUSHING'S SIGNS: no striae    CAPILLARY BLOOD GLUCOSE  228 (07-24 @ 13:00)  216 (07-24 @ 12:00)  201 (07-24 @ 11:00)  185 (07-24 @ 10:00)  179 (07-24 @ 09:00)  123 (07-24 @ 08:00)  138 (07-24 @ 07:00)  147 (07-24 @ 06:00)  235 (07-24 @ 05:00)  299 (07-24 @ 04:00)  376 (07-24 @ 03:00)  494 (07-24 @ 02:00)  508 (07-24 @ 01:08)                            8.3    8.2   )-----------( 228      ( 24 Jul 2017 09:52 )             24.0       07-24    132<L>  |  88<L>  |  47<H>  ----------------------------<  175<H>  4.4   |  27  |  6.68<H>    EGFR if : 7<L>  EGFR if non : 6<L>    Ca    8.1<L>      07-24  Mg     2.4     07-24  Phos  4.7     07-24    TPro  6.7  /  Alb  3.9  /  TBili  0.3  /  DBili  x   /  AST  25  /  ALT  12  /  AlkPhos  248<H>  07-24    Hemoglobin A1C, Whole Blood: 6.8 % <H> [4.0 - 5.6] (07-24-17 @ 06:15)  Hemoglobin A1C, Whole Blood: 6.8 % <H> [4.0 - 5.6] (07-23-17 @ 22:45)  Hemoglobin A1C, Whole Blood: 7.3 % <H> [4.0 - 5.6] (06-16-17 @ 04:45) HPI: 63 yo female with hx of T1DM controlled (HbA1C 6.8%) x 41 years with pvd s/p fem-pop b/l, retinopathy, CVA CAD, ESRD on HD, and neuropathy here with DKA and acute cp x 2 hours which awakened pt from her sleep. The pain was L sided and radiating to her L arm and back associated with sob and blurred vision, no dizziness or palpitations - better with asa and nothing made it worse, She describes it as "heart attack pain". When she checked her bs it said "HIGH". She had bolused herself 8.7 units of insulin 2 hours prior when she ate some watermelon, which she vomited up when she had the cp. Pt follows with endo Dr. Ley of [facility]. Last NAYA was . Pt is concerned that maybe her body no longer responds to insulin. Of note, the MICU RN told me that her  (Mr. Cui) found that her current Novolog vial  in .     MEDICATIONS started in ED:  insulin Infusion 6 Unit(s)/Hr (6 mL/Hr) IV Continuous <Continuous>  heparin  Infusion.  Unit(s)/Hr (7 mL/Hr) IV Continuous <Continuous>  morphine  - Injectable 4 milliGRAM(s) IV Push once  sodium chloride 0.9%. 1000 milliLiter(s) (50 mL/Hr) IV Continuous <Continuous>  vancomycin  IVPB 1000 milliGRAM(s) IV Intermittent once  piperacillin/tazobactam IVPB. 3.375 Gram(s) IV Intermittent every 12 hours  morphine  - Injectable 4 milliGRAM(s) IV Push once  sodium chloride 0.9% Bolus 1000 milliLiter(s) IV Bolus once    LABS on admission:                        9.9    10.0  )-----------( 292      ( 2017 18:00 )             32.4     Hgb Trend: 9.9<--  07-23    128<L>  |  78<L>  |  41<H>  ----------------------------<  959<HH>  6.4<HH>   |  17<L>  |  6.43<H>    Ca    8.6      2017 18:00    TPro  7.5  /  Alb  4.4  /  TBili  0.4  /  DBili  x   /  AST  20  /  ALT  13  /  AlkPhos  275<H>      Creatinine Trend: 6.43<--, 5.82<--, 3.83<--, 6.36<--, 4.22<--, 4.55<--  PT/INR - ( 2017 18:00 )   PT: 11.6 sec;   INR: 1.07 ratio      PTT - ( 2017 18:00 )  PTT:31.1 sec    Venous Blood Gas:   @ 18:00  7.24/45/25/  VBG Lactate: 2.4 (2017 00:01)      PAST MEDICAL & SURGICAL HISTORY:  Subclavian vein stenosis, left: s/p stent  Cellulitis:  left foot  DKA, type 1: 2015  ACS (acute coronary syndrome): 2015 - cath revealed 100% ostial stenosis not amenable to PCI - medical management  MI, old  TIA (transient ischemic attack): x 2 - 8-9 years ago prior to ASD/VSD repair  HTN (hypertension)  CAD (coronary artery disease): s/p stents  Murmur, cardiac  Gout: past  CVA (cerebral infarction): with no residual, 8 yrs ago, prior to heart surgery - ST memory loss  Peripheral vascular disease: occluded left fem-pop bypass 2015  Diabetes mellitus type 1: Insulin Dependent - [employer]  Minimed Paradigm Insulin Pump - Novolog  ESRD (end stage renal disease): dialysis , tue, thursday, saturday  Hyperlipidemia  Status post device closure of ASD: &quot;clamshell&quot;  History of cardiac catheterization: 2015 - no intervention  S/P femoral-popliteal bypass surgery: L and R in  with graft; 2015 CFA angioplasty left and ileofemoral endarterectomywith vein patch angioplasty of left fem-pop bypass graft  Multiple vascular surgery both leg, left fempop bypass revision 2015  AV (arteriovenous fistula): Left AV graft; revision with stent placement 2-3 years ago  S/P cholecystectomy      FAMILY HISTORY:  Family history of smoking  Family history of hypertension  Family history of cancer (Sibling)      Social History:  Tobacco: [id number] yrs, quit 17 years ago  Lives with  in [place]    Outpatient Medications:    MEDICATIONS  (STANDING):  piperacillin/tazobactam IVPB. 3.375 Gram(s) IV Intermittent every 12 hours  DULoxetine 60 milliGRAM(s) Oral daily  atorvastatin 20 milliGRAM(s) Oral at bedtime  clopidogrel Tablet 75 milliGRAM(s) Oral daily  metoprolol succinate ER 50 milliGRAM(s) Oral daily  aspirin enteric coated 81 milliGRAM(s) Oral daily  cinacalcet 60 milliGRAM(s) Oral daily  sevelamer hydrochloride 2400 milliGRAM(s) Oral every 8 hours  hydrALAZINE 100 milliGRAM(s) Oral every 8 hours  dextrose 50% Injectable 50 milliLiter(s) IV Push every 15 minutes  dextrose 50% Injectable 25 milliLiter(s) IV Push every 15 minutes  senna 2 Tablet(s) Oral at bedtime  docusate sodium 100 milliGRAM(s) Oral three times a day  heparin  Infusion.  Unit(s)/Hr (7.5 mL/Hr) IV Continuous <Continuous>  insulin Infusion 1 Unit(s)/Hr (1 mL/Hr) IV Continuous <Continuous>  dextrose 5% + sodium chloride 0.9%. 1000 milliLiter(s) (30 mL/Hr) IV Continuous <Continuous>    Allergies  No Known Allergies    Review of Systems:  Constitutional: No fever/chills  Eyes: No blurry vision, diplopia  Neuro: +paresthesias of feet b/l, no headache  Cardiovascular: +chest pain,no palpitations  Respiratory: +SOB, no cough  GI: +nausea/vomiting   MS: No joint pain or stiffness  ALL OTHER SYSTEMS REVIEWED AND NEGATIVE        PHYSICAL EXAM:  VITALS: T(C): 36.9 (17 @ 12:00)  T(F): 98.4 (17 @ 12:00), Max: 98.5 (17 @ 04:00)  HR: 75 (17 @ 13:00) (75 - 107)  BP: 115/56 (17 @ 02:00) (111/50 - 146/57)  RR:  (14 - 32)  SpO2:  (98% - 100%)  Wt(kg): --  GENERAL: NAD, well-groomed, well-developed  EYES: No proptosis, +anicteric  HEENT:  Atraumatic, Normocephalic, moist mucous membranes  THYROID: Normal size, no palpable nodules  RESPIRATORY: Clear to auscultation bilaterally; No rales, rhonchi, wheezing, or rubs  CARDIOVASCULAR: Regular rate and rhythm; +3/6 BC over L side of chest  GI: Soft, nontender, non distended, normal bowel sounds  SKIN: Dry, intact, No rashes or lesions on feet b/l  PSYCH: reactive affect, euthymic mood      CAPILLARY BLOOD GLUCOSE  228 ( @ 13:00)  216 ( @ 12:00)  201 ( @ 11:00)  185 ( @ 10:00)  179 ( @ 09:00)  123 ( @ 08:00)  138 ( @ 07:00)  147 ( @ 06:00)  235 ( @ 05:00)  299 ( @ 04:00)  376 ( @ 03:00)  494 ( @ 02:00)  508 ( @ 01:08)                            8.3    8.2   )-----------( 228      ( 2017 09:52 )             24.0           132<L>  |  88<L>  |  47<H>  ----------------------------<  175<H>  4.4   |  27  |  6.68<H>    EGFR if : 7<L>  EGFR if non : 6<L>    Ca    8.1<L>        Mg     2.4       Phos  4.7         TPro  6.7  /  Alb  3.9  /  TBili  0.3  /  DBili  x   /  AST  25  /  ALT  12  /  AlkPhos  248<H>      Hemoglobin A1C, Whole Blood: 6.8 % <H> [4.0 - 5.6] (17 @ 06:15)  Hemoglobin A1C, Whole Blood: 6.8 % <H> [4.0 - 5.6] (17 @ 22:45)  Hemoglobin A1C, Whole Blood: 7.3 % <H> [4.0 - 5.6] (17 @ 04:45)

## 2017-07-24 NOTE — CONSULT NOTE ADULT - SUBJECTIVE AND OBJECTIVE BOX
CHIEF COMPLAINT:Patient is a 62y old  Female who presents with a chief complaint of     HISTORY OF PRESENT ILLNESS:HPI:  This is 62Fw/ PMHx T1DM on insulin pump with multiple complications and morbid medical conditions, HTN, HLD, former smoker, gout, MI, CAD s/p PCI to RCA and brachytherapy, dCHF, chronic LLE pain and edema in setting of severe PVD s/p L & R fem-pop bypass  graft,  multiple vascular surgery both leg w/ fem-pop bypass revisions , Subclavian vein stenosis, left: s/p stent, on ASA and Plavix,  ESRD on HD (Tu/Thr/Sat) via L AVF, CVA with memory deficit, depression, chronic pain management on oxycodone last RX 6/27/17 ISTOP Ref#[id number], s/p cholecystectomy, multiple prior admissions to hospital for c/o hyperglycemia,  increasing leg edema, and chest pain w/ last admit 6/30-7/6 now,   p/w mid sternal chest pain  and  glucose >900.   States that she has not yet followed up with endocrine since last d/c but has appointment scheduled in upcoming week. Pt had HD yesterday and reports that today that she had watermelon, gave herself a bolus via her insulin pump. However afterwards her sugars were greater than 200’s, and  began to have chest pain, prompting her to present to ED. Pt reports that she has felt febrile over past two days, but denies cough, LIPSCOMB, diaphoresis, pain on exertion, dysuria, hematuria, night sweats, malaise,  nausea, vomiting, chills, diarrhea, abdominal pain, sick contact.  ICU Vital Signs Last 24 Hrs  T(F): 98   HR: 82 - 84  BP: 111/50 - 118/50  RR: 18 - 20  SpO2: 98% - 100%  MEDICATIONS started in ED:  insulin Infusion 6 Unit(s)/Hr (6 mL/Hr) IV Continuous <Continuous>  heparin  Infusion.  Unit(s)/Hr (7 mL/Hr) IV Continuous <Continuous>  morphine  - Injectable 4 milliGRAM(s) IV Push once  sodium chloride 0.9%. 1000 milliLiter(s) (50 mL/Hr) IV Continuous <Continuous>  vancomycin  IVPB 1000 milliGRAM(s) IV Intermittent once  piperacillin/tazobactam IVPB. 3.375 Gram(s) IV Intermittent every 12 hours  morphine  - Injectable 4 milliGRAM(s) IV Push once  sodium chloride 0.9% Bolus 1000 milliLiter(s) IV Bolus once  LABS:                        9.9    10.0  )-----------( 292      ( 23 Jul 2017 18:00 )             32.4     Hgb Trend: 9.9<--  07-23    128<L>  |  78<L>  |  41<H>  ----------------------------<  959<HH>  6.4<HH>   |  17<L>  |  6.43<H>    Ca    8.6      23 Jul 2017 18:00    TPro  7.5  /  Alb  4.4  /  TBili  0.4  /  DBili  x   /  AST  20  /  ALT  13  /  AlkPhos  275<H>  07-23    Creatinine Trend: 6.43<--, 5.82<--, 3.83<--, 6.36<--, 4.22<--, 4.55<--  PT/INR - ( 23 Jul 2017 18:00 )   PT: 11.6 sec;   INR: 1.07 ratio         PTT - ( 23 Jul 2017 18:00 )  PTT:31.1 sec      Venous Blood Gas:  07-23 @ 18:00  7.24/45/25/19/29  VBG Lactate: 2.4 (24 Jul 2017 00:01)      PAST MEDICAL & SURGICAL HISTORY:  Subclavian vein stenosis, left: s/p stent  Cellulitis: 2015 left foot  DKA, type 1: 1/2015  ACS (acute coronary syndrome): 1/2015 - cath revealed 100% ostial stenosis not amenable to PCI - medical management  MI, old  TIA (transient ischemic attack): x 2 - 8-9 years ago prior to ASD/VSD repair  HTN (hypertension)  CAD (coronary artery disease): s/p stents  Murmur, cardiac  Gout: past  CVA (cerebral infarction): with no residual, 8 yrs ago, prior to heart surgery - ST memory loss  Peripheral vascular disease: occluded left fem-pop bypass 5/2015  Diabetes mellitus type 1: Insulin Dependent - [employer]tr[employer]  Minimed Paradigm Insulin Pump - Novolog  ESRD (end stage renal disease): dialysis , tue, thursday, saturday  Hyperlipidemia  Status post device closure of ASD: &quot;brenna&quot;  History of cardiac catheterization: 1/2015 - no intervention  S/P femoral-popliteal bypass surgery: L and R in 2013 with graft; 5/2015 CFA angioplasty left and ileofemoral endarterectomywith vein patch angioplasty of left fem-pop bypass graft  Multiple vascular surgery both leg, left fempop bypass revision 11/2015  AV (arteriovenous fistula): Left AV graft; revision with stent placement 2-3 years ago  S/P cholecystectomy          MEDICATIONS:  clopidogrel Tablet 75 milliGRAM(s) Oral daily  metoprolol succinate ER 50 milliGRAM(s) Oral daily  aspirin enteric coated 81 milliGRAM(s) Oral daily  hydrALAZINE 100 milliGRAM(s) Oral every 8 hours  heparin  Infusion.  Unit(s)/Hr IV Continuous <Continuous>  norepinephrine Infusion 0.01 MICROgram(s)/kG/Min IV Continuous <Continuous>    piperacillin/tazobactam IVPB. 3.375 Gram(s) IV Intermittent every 12 hours      DULoxetine 60 milliGRAM(s) Oral daily    senna 2 Tablet(s) Oral at bedtime  docusate sodium 100 milliGRAM(s) Oral three times a day    atorvastatin 20 milliGRAM(s) Oral at bedtime  dextrose 50% Injectable 50 milliLiter(s) IV Push every 15 minutes  dextrose 50% Injectable 25 milliLiter(s) IV Push every 15 minutes  insulin Infusion 1 Unit(s)/Hr IV Continuous <Continuous>    dextrose 5% + sodium chloride 0.45%. 1000 milliLiter(s) IV Continuous <Continuous>      FAMILY HISTORY:  Family history of smoking  Family history of hypertension  Family history of cancer (Sibling)      Non-contributory    SOCIAL HISTORY:    [ ] Tobacco  [ ] Drugs  [ ] Alcohol    Allergies    No Known Allergies    Intolerances    	    REVIEW OF SYSTEMS:  CONSTITUTIONAL: No fever  EYES: No eye pain, visual disturbances, or discharge  ENMT:  No difficulty hearing, tinnitus  NECK: No pain or stiffness  RESPIRATORY: No cough, wheezing,  CARDIOVASCULAR: No chest pain, palpitations, passing out, dizziness, or leg swelling  GASTROINTESTINAL:  No nausea, vomiting, diarrhea or constipation. No melena.  GENITOURINARY: No dysuria, hematuria  NEUROLOGICAL: No stroke like symptoms  SKIN: No burning or lesions   LYMPH Nodes: No enlarged glands  ENDOCRINE: No heat or cold intolerance  MUSCULOSKELETAL: No joint pain or swelling  PSYCHIATRIC: No  anxiety, mood swings  HEME/LYMPH: No bleeding gums  ALLERY AND IMMUNOLOGIC: No hives or eczema	    All other ROS negative    PHYSICAL EXAM:  T(C): 36.8 (07-24-17 @ 08:00), Max: 36.9 (07-24-17 @ 04:00)  HR: 84 (07-24-17 @ 09:00) (78 - 107)  BP: 115/56 (07-24-17 @ 02:00) (111/50 - 146/57)  RR: 18 (07-24-17 @ 09:00) (14 - 32)  SpO2: 100% (07-24-17 @ 09:00) (98% - 100%)  Wt(kg): --  I&O's Summary    23 Jul 2017 07:01  -  24 Jul 2017 07:00  --------------------------------------------------------  IN: 849.5 mL / OUT: 0 mL / NET: 849.5 mL    24 Jul 2017 07:01  -  24 Jul 2017 10:18  --------------------------------------------------------  IN: 266 mL / OUT: 0 mL / NET: 266 mL        Appearance: Normal	  HEENT:   Normal oral mucosa, EOMI	  Lymphatic: No lymphadenopathy  Cardiovascular: Normal S1 S2, No JVD, No murmurs, No edema  Respiratory: Lungs clear to auscultation	  Psychiatry: Alert, Mood & affect appropriate  Gastrointestinal:  Soft, Non-tender, + BS	  Skin: No rashes, No ecchymoses, No cyanosis	  Neurologic: Non-focal  Extremities:  No clubbing, cyanosis or edema  Vascular: Peripheral pulses palpable 2+ bilaterally  	    ECG:  	  RADIOLOGY:  	  	  CARDIAC MARKERS:  Troponin T, Serum: 0.43 ng/mL (07-23 @ 23:46)  Troponin T, Serum: 0.10 ng/mL (07-23 @ 18:00)                                  8.3    8.2   )-----------( 228      ( 24 Jul 2017 09:52 )             24.0     07-24    135  |  90<L>  |  46<H>  ----------------------------<  146<H>  3.9   |  24  |  6.98<H>    Ca    8.4      24 Jul 2017 06:12  Phos  3.9     07-24  Mg     2.4     07-24    TPro  6.7  /  Alb  3.9  /  TBili  0.3  /  DBili  x   /  AST  25  /  ALT  12  /  AlkPhos  248<H>  07-24    proBNP:   Lipid Profile:   HgA1c: Hemoglobin A1C, Whole Blood: 6.8 % (07-24 @ 06:15)  Hemoglobin A1C, Whole Blood: 6.8 % (07-23 @ 22:45)    TSH:       Assesment/Plan:         Anish Campos DO [facility]  [facility]  [phone] CHIEF COMPLAINT:Patient is a 62y old  Female who presents with a chief complaint of     HISTORY OF PRESENT ILLNESS:HPI:  This is 62Fw/ PMHx T1DM on insulin pump with multiple complications and morbid medical conditions, HTN, HLD, former smoker, gout, MI, CAD s/p PCI to RCA and brachytherapy, dCHF, chronic LLE pain and edema in setting of severe PVD s/p L & R fem-pop bypass  graft,  multiple vascular surgery both leg w/ fem-pop bypass revisions , Subclavian vein stenosis, left: s/p stent, on ASA and Plavix,  ESRD on HD (Tu/Thr/Sat) via L AVF, CVA with memory deficit, depression, chronic pain management on oxycodone last RX 6/27/17 ISTOP Ref#[id number], s/p cholecystectomy, multiple prior admissions to hospital for c/o hyperglycemia,  increasing leg edema, and chest pain w/ last admit 6/30-7/6 now,   p/w mid sternal chest pain  and  glucose >900.   States that she has not yet followed up with endocrine since last d/c but has appointment scheduled in upcoming week. Pt had HD yesterday and reports that today that she had watermelon, gave herself a bolus via her insulin pump. However afterwards her sugars were greater than 200’s, and  began to have chest pain, prompting her to present to ED. Pt reports that she has felt febrile over past two days, but denies cough, LIPSCOMB, diaphoresis, pain on exertion, dysuria, hematuria, night sweats, malaise,  nausea, vomiting, chills, diarrhea, abdominal pain, sick contact.  ICU Vital Signs Last 24 Hrs  T(F): 98   HR: 82 - 84  BP: 111/50 - 118/50  RR: 18 - 20  SpO2: 98% - 100%  MEDICATIONS started in ED:  insulin Infusion 6 Unit(s)/Hr (6 mL/Hr) IV Continuous <Continuous>  heparin  Infusion.  Unit(s)/Hr (7 mL/Hr) IV Continuous <Continuous>  morphine  - Injectable 4 milliGRAM(s) IV Push once  sodium chloride 0.9%. 1000 milliLiter(s) (50 mL/Hr) IV Continuous <Continuous>  vancomycin  IVPB 1000 milliGRAM(s) IV Intermittent once  piperacillin/tazobactam IVPB. 3.375 Gram(s) IV Intermittent every 12 hours  morphine  - Injectable 4 milliGRAM(s) IV Push once  sodium chloride 0.9% Bolus 1000 milliLiter(s) IV Bolus once  LABS:                        9.9    10.0  )-----------( 292      ( 23 Jul 2017 18:00 )             32.4     Hgb Trend: 9.9<--  07-23    128<L>  |  78<L>  |  41<H>  ----------------------------<  959<HH>  6.4<HH>   |  17<L>  |  6.43<H>    Ca    8.6      23 Jul 2017 18:00    TPro  7.5  /  Alb  4.4  /  TBili  0.4  /  DBili  x   /  AST  20  /  ALT  13  /  AlkPhos  275<H>  07-23    Creatinine Trend: 6.43<--, 5.82<--, 3.83<--, 6.36<--, 4.22<--, 4.55<--  PT/INR - ( 23 Jul 2017 18:00 )   PT: 11.6 sec;   INR: 1.07 ratio         PTT - ( 23 Jul 2017 18:00 )  PTT:31.1 sec      Venous Blood Gas:  07-23 @ 18:00  7.24/45/25/19/29  VBG Lactate: 2.4 (24 Jul 2017 00:01)      PAST MEDICAL & SURGICAL HISTORY:  Subclavian vein stenosis, left: s/p stent  Cellulitis: 2015 left foot  DKA, type 1: 1/2015  ACS (acute coronary syndrome): 1/2015 - cath revealed 100% ostial stenosis not amenable to PCI - medical management  MI, old  TIA (transient ischemic attack): x 2 - 8-9 years ago prior to ASD/VSD repair  HTN (hypertension)  CAD (coronary artery disease): s/p stents  Murmur, cardiac  Gout: past  CVA (cerebral infarction): with no residual, 8 yrs ago, prior to heart surgery - ST memory loss  Peripheral vascular disease: occluded left fem-pop bypass 5/2015  Diabetes mellitus type 1: Insulin Dependent - [employer]  Minimed Paradigm Insulin Pump - Novolog  ESRD (end stage renal disease): dialysis , tue, thursday, saturday  Hyperlipidemia  Status post device closure of ASD: &quot;brenna&quot;  History of cardiac catheterization: 1/2015 - no intervention  S/P femoral-popliteal bypass surgery: L and R in 2013 with graft; 5/2015 CFA angioplasty left and ileofemoral endarterectomywith vein patch angioplasty of left fem-pop bypass graft  Multiple vascular surgery both leg, left fempop bypass revision 11/2015  AV (arteriovenous fistula): Left AV graft; revision with stent placement 2-3 years ago  S/P cholecystectomy          MEDICATIONS:  clopidogrel Tablet 75 milliGRAM(s) Oral daily  metoprolol succinate ER 50 milliGRAM(s) Oral daily  aspirin enteric coated 81 milliGRAM(s) Oral daily  hydrALAZINE 100 milliGRAM(s) Oral every 8 hours  heparin  Infusion.  Unit(s)/Hr IV Continuous <Continuous>  norepinephrine Infusion 0.01 MICROgram(s)/kG/Min IV Continuous <Continuous>    piperacillin/tazobactam IVPB. 3.375 Gram(s) IV Intermittent every 12 hours      DULoxetine 60 milliGRAM(s) Oral daily    senna 2 Tablet(s) Oral at bedtime  docusate sodium 100 milliGRAM(s) Oral three times a day    atorvastatin 20 milliGRAM(s) Oral at bedtime  dextrose 50% Injectable 50 milliLiter(s) IV Push every 15 minutes  dextrose 50% Injectable 25 milliLiter(s) IV Push every 15 minutes  insulin Infusion 1 Unit(s)/Hr IV Continuous <Continuous>    dextrose 5% + sodium chloride 0.45%. 1000 milliLiter(s) IV Continuous <Continuous>      FAMILY HISTORY:  Family history of smoking  Family history of hypertension  Family history of cancer (Sibling)      Non-contributory    SOCIAL HISTORY:    not an active smoker    Allergies    No Known Allergies    Intolerances    	    REVIEW OF SYSTEMS:  CONSTITUTIONAL: No fever  EYES: No eye pain, visual disturbances, or discharge  ENMT:  No difficulty hearing, tinnitus  NECK: No pain or stiffness  RESPIRATORY: No cough, wheezing,  CARDIOVASCULAR: + chest pain on presentation and leg swelling  GASTROINTESTINAL:  No nausea, vomiting, diarrhea or constipation. No melena.  GENITOURINARY: No dysuria, hematuria  NEUROLOGICAL: No stroke like symptoms  SKIN: No burning or lesions   LYMPH Nodes: No enlarged glands  ENDOCRINE: high glucose  MUSCULOSKELETAL: No joint pain or swelling  PSYCHIATRIC: No  anxiety, mood swings  HEME/LYMPH: No bleeding gums  ALLERY AND IMMUNOLOGIC: No hives or eczema	    All other ROS negative    PHYSICAL EXAM:  T(C): 36.8 (07-24-17 @ 08:00), Max: 36.9 (07-24-17 @ 04:00)  HR: 84 (07-24-17 @ 09:00) (78 - 107)  BP: 115/56 (07-24-17 @ 02:00) (111/50 - 146/57)  RR: 18 (07-24-17 @ 09:00) (14 - 32)  SpO2: 100% (07-24-17 @ 09:00) (98% - 100%)  Wt(kg): --  I&O's Summary    23 Jul 2017 07:01  -  24 Jul 2017 07:00  --------------------------------------------------------  IN: 849.5 mL / OUT: 0 mL / NET: 849.5 mL    24 Jul 2017 07:01  -  24 Jul 2017 10:18  --------------------------------------------------------  IN: 266 mL / OUT: 0 mL / NET: 266 mL        Appearance: Normal	  HEENT:   Normal oral mucosa, EOMI	  Lymphatic: No lymphadenopathy  Cardiovascular: Normal S1 S2, No JVD, No murmurs, No edema  Respiratory: Lungs clear to auscultation	  Psychiatry: Alert, Mood & affect appropriate  Gastrointestinal:  Soft, Non-tender, + BS	  Skin: No rashes, No ecchymoses, No cyanosis	  Neurologic: Non-focal  Extremities:  No clubbing, cyanosis or edema  Vascular: Peripheral pulses palpable 2+ bilaterally  	    ECG:  initial EKGs with hyperacute T waves and inferolateral STD, repeat later today with resolution of findings  RADIOLOGY:  	  	  CARDIAC MARKERS:  Troponin T, Serum: 0.43 ng/mL (07-23 @ 23:46)  Troponin T, Serum: 0.10 ng/mL (07-23 @ 18:00)           8.3    8.2   )-----------( 228      ( 24 Jul 2017 09:52 )             24.0     07-24    135  |  90<L>  |  46<H>  ----------------------------<  146<H>  3.9   |  24  |  6.98<H>    Ca    8.4      24 Jul 2017 06:12  Phos  3.9     07-24  Mg     2.4     07-24    TPro  6.7  /  Alb  3.9  /  TBili  0.3  /  DBili  x   /  AST  25  /  ALT  12  /  AlkPhos  248<H>  07-24    proBNP:   Lipid Profile:   HgA1c: Hemoglobin A1C, Whole Blood: 6.8 % (07-24 @ 06:15)  Hemoglobin A1C, Whole Blood: 6.8 % (07-23 @ 22:45)    TSH:       Assesment/Plan:   62Fw/ PMHx T1DM on insulin pump with multiple complications and morbid medical conditions, HTN, HLD, former smoker, gout, MI, CAD s/p PCI to RCA and brachytherapy, dCHF, chronic LLE pain and edema in setting of severe PVD s/p L & R fem-pop bypass  graft,  multiple vascular surgery both leg w/ fem-pop bypass revisions , Subclavian vein stenosis, left: s/p stent, on ASA and Plavix,  ESRD on HD (Tu/Thr/Sat) via L AVF, CVA with memory deficit, depression, admitted with NSTEMI and DKA  1. NSTEMI - Seen by STEMI team in ED and EKG changes deemed secondary to hyperkalemia and not taken to cath lab   --pt with known aggressive CAD with recent RCA brachytherapy in 3/2017.   --Agree with ASA/Plavix/hep gtt/Toprol/Statin  -- may benefit from cath once stabilized from medical standpoint  - Appreciate MICU care  - Pt known to cardiologist Dr Vela - will contact him and have his service follow.       Anish Campos DO [facility]  [facility]  [phone]

## 2017-07-24 NOTE — CONSULT NOTE ADULT - ASSESSMENT
62 f with DKA / diabetes Mellitus type 1  BS control/ endocrine follow.  s/p NSTEMI-AC, asa/Plavix. Cardiology follow, pain control  ESRD- continue HD  Peripheral Vascular disease- conservative management.  Discussed with .  Further action as per clinical course   Pierce Viera MD pager [phone]

## 2017-07-24 NOTE — CONSULT NOTE ADULT - ASSESSMENT
60 yo F with DM, ESRD. PVD with recurrent DKA/hyperlgycemia. has hx of LLE bypass as well  1- DM/DKA  2- ESRD  3- recent hyperkalemia  4- NSTEMI  5- htn     insulin drip  to control glucose.   multiple readmissions for dka.   also ntstemi  plavix lipitor add asa if cards agrees  hd today   consent for hd obtained.   resume ace-I as overall stabilizes

## 2017-07-24 NOTE — H&P ADULT - PROBLEM SELECTOR PLAN 5
hyperkalemia in setting P HD tx   treated PRN   repeat BMP q 4   ekg hyperkalemia in setting P HD tx   treated PRN   repeat BMP q 4   ekg  hold ACE

## 2017-07-24 NOTE — H&P ADULT - ASSESSMENT
This is 63 y/o F with above mentioned PMHx p/ from home with chest pain and hyperglycemia now admitted to MICU  for DKA and r/o ACS.

## 2017-07-24 NOTE — H&P ADULT - PROBLEM SELECTOR PLAN 9
Hyponatremia, hypochloremia , hyperkalemia , hyperglycemia   IVF hydration   insulin gtt   treat hyperkalemia , P HD tx   BMP q 4   FS q 1 hr   urine lytes and osmo , serum osmo   neuro checks   seizure , fall precaution

## 2017-07-24 NOTE — CONSULT NOTE ADULT - ASSESSMENT
63 yo female with hx of T1DM with pvd s/p fem-pop b/l, retinopathy, CAD, ESRD on HD, and neuropathy here with DKA and NSTEMI. 63 yo female with hx of T1DM controlled with pvd s/p fem-pop b/l, retinopathy, CAD, ESRD on HD, and neuropathy here with DKA and NSTEMI.

## 2017-07-24 NOTE — CONSULT NOTE ADULT - PROBLEM SELECTOR RECOMMENDATION 9
-continue insulin gtt as patient's bs are increasing so I am concerned that she can go back into DKA, so I would continue the gtt. Her most recent bmp shows a worsening gap and serum glucose.
- Hyperglycemic with BMP glucose>900  - Anion gap present on admission, possibly 2/2 hyperglycemia  - Pt started on insulin gtt and endocrine called in ED  - BMP Q4H- B-hydroxybutyrate pending  - Will work up infectious cause, BCx, UCx, CXR unremarkable

## 2017-07-24 NOTE — CONSULT NOTE ADULT - NSHPATTENDINGPLANDISCUSS_GEN_ALL_CORE
MICU fellow and intern
Case discussed extensively with patient,  at the bedside, staff, team and specialist on board

## 2017-07-25 LAB
ANION GAP SERPL CALC-SCNC: 13 MMOL/L — SIGNIFICANT CHANGE UP (ref 5–17)
ANION GAP SERPL CALC-SCNC: 14 MMOL/L — SIGNIFICANT CHANGE UP (ref 5–17)
ANION GAP SERPL CALC-SCNC: 15 MMOL/L — SIGNIFICANT CHANGE UP (ref 5–17)
APTT BLD: 59.7 SEC — HIGH (ref 27.5–37.4)
BUN SERPL-MCNC: 12 MG/DL — SIGNIFICANT CHANGE UP (ref 7–23)
BUN SERPL-MCNC: 13 MG/DL — SIGNIFICANT CHANGE UP (ref 7–23)
BUN SERPL-MCNC: 15 MG/DL — SIGNIFICANT CHANGE UP (ref 7–23)
BUN SERPL-MCNC: 16 MG/DL — SIGNIFICANT CHANGE UP (ref 7–23)
BUN SERPL-MCNC: 20 MG/DL — SIGNIFICANT CHANGE UP (ref 7–23)
CALCIUM SERPL-MCNC: 7.3 MG/DL — LOW (ref 8.4–10.5)
CALCIUM SERPL-MCNC: 7.7 MG/DL — LOW (ref 8.4–10.5)
CALCIUM SERPL-MCNC: 7.7 MG/DL — LOW (ref 8.4–10.5)
CALCIUM SERPL-MCNC: 7.8 MG/DL — LOW (ref 8.4–10.5)
CALCIUM SERPL-MCNC: 8 MG/DL — LOW (ref 8.4–10.5)
CHLORIDE SERPL-SCNC: 96 MMOL/L — SIGNIFICANT CHANGE UP (ref 96–108)
CHLORIDE SERPL-SCNC: 97 MMOL/L — SIGNIFICANT CHANGE UP (ref 96–108)
CHLORIDE SERPL-SCNC: 97 MMOL/L — SIGNIFICANT CHANGE UP (ref 96–108)
CHLORIDE SERPL-SCNC: 98 MMOL/L — SIGNIFICANT CHANGE UP (ref 96–108)
CHLORIDE SERPL-SCNC: 98 MMOL/L — SIGNIFICANT CHANGE UP (ref 96–108)
CK MB BLD-MCNC: 6.3 % — HIGH (ref 0–3.5)
CK MB CFR SERPL CALC: 10.9 NG/ML — HIGH (ref 0–3.8)
CK SERPL-CCNC: 173 U/L — HIGH (ref 25–170)
CO2 SERPL-SCNC: 24 MMOL/L — SIGNIFICANT CHANGE UP (ref 22–31)
CO2 SERPL-SCNC: 25 MMOL/L — SIGNIFICANT CHANGE UP (ref 22–31)
CO2 SERPL-SCNC: 25 MMOL/L — SIGNIFICANT CHANGE UP (ref 22–31)
CO2 SERPL-SCNC: 27 MMOL/L — SIGNIFICANT CHANGE UP (ref 22–31)
CO2 SERPL-SCNC: 28 MMOL/L — SIGNIFICANT CHANGE UP (ref 22–31)
CREAT SERPL-MCNC: 3.33 MG/DL — HIGH (ref 0.5–1.3)
CREAT SERPL-MCNC: 3.59 MG/DL — HIGH (ref 0.5–1.3)
CREAT SERPL-MCNC: 3.88 MG/DL — HIGH (ref 0.5–1.3)
CREAT SERPL-MCNC: 4.32 MG/DL — HIGH (ref 0.5–1.3)
CREAT SERPL-MCNC: 4.73 MG/DL — HIGH (ref 0.5–1.3)
GLUCOSE SERPL-MCNC: 152 MG/DL — HIGH (ref 70–99)
GLUCOSE SERPL-MCNC: 153 MG/DL — HIGH (ref 70–99)
GLUCOSE SERPL-MCNC: 154 MG/DL — HIGH (ref 70–99)
GLUCOSE SERPL-MCNC: 205 MG/DL — HIGH (ref 70–99)
GLUCOSE SERPL-MCNC: 249 MG/DL — HIGH (ref 70–99)
HCT VFR BLD CALC: 27.2 % — LOW (ref 34.5–45)
HGB BLD-MCNC: 9 G/DL — LOW (ref 11.5–15.5)
MAGNESIUM SERPL-MCNC: 2 MG/DL — SIGNIFICANT CHANGE UP (ref 1.6–2.6)
MAGNESIUM SERPL-MCNC: 2 MG/DL — SIGNIFICANT CHANGE UP (ref 1.6–2.6)
MAGNESIUM SERPL-MCNC: 2.1 MG/DL — SIGNIFICANT CHANGE UP (ref 1.6–2.6)
MCHC RBC-ENTMCNC: 33 GM/DL — SIGNIFICANT CHANGE UP (ref 32–36)
MCHC RBC-ENTMCNC: 34.2 PG — HIGH (ref 27–34)
MCV RBC AUTO: 104 FL — HIGH (ref 80–100)
PHOSPHATE SERPL-MCNC: 2.3 MG/DL — LOW (ref 2.5–4.5)
PHOSPHATE SERPL-MCNC: 4.1 MG/DL — SIGNIFICANT CHANGE UP (ref 2.5–4.5)
PHOSPHATE SERPL-MCNC: 4.1 MG/DL — SIGNIFICANT CHANGE UP (ref 2.5–4.5)
PHOSPHATE SERPL-MCNC: 4.3 MG/DL — SIGNIFICANT CHANGE UP (ref 2.5–4.5)
PHOSPHATE SERPL-MCNC: 4.5 MG/DL — SIGNIFICANT CHANGE UP (ref 2.5–4.5)
PLATELET # BLD AUTO: 231 K/UL — SIGNIFICANT CHANGE UP (ref 150–400)
POTASSIUM SERPL-MCNC: 3.8 MMOL/L — SIGNIFICANT CHANGE UP (ref 3.5–5.3)
POTASSIUM SERPL-MCNC: 4.4 MMOL/L — SIGNIFICANT CHANGE UP (ref 3.5–5.3)
POTASSIUM SERPL-MCNC: 4.8 MMOL/L — SIGNIFICANT CHANGE UP (ref 3.5–5.3)
POTASSIUM SERPL-MCNC: 4.8 MMOL/L — SIGNIFICANT CHANGE UP (ref 3.5–5.3)
POTASSIUM SERPL-MCNC: 5 MMOL/L — SIGNIFICANT CHANGE UP (ref 3.5–5.3)
POTASSIUM SERPL-SCNC: 3.8 MMOL/L — SIGNIFICANT CHANGE UP (ref 3.5–5.3)
POTASSIUM SERPL-SCNC: 4.4 MMOL/L — SIGNIFICANT CHANGE UP (ref 3.5–5.3)
POTASSIUM SERPL-SCNC: 4.8 MMOL/L — SIGNIFICANT CHANGE UP (ref 3.5–5.3)
POTASSIUM SERPL-SCNC: 4.8 MMOL/L — SIGNIFICANT CHANGE UP (ref 3.5–5.3)
POTASSIUM SERPL-SCNC: 5 MMOL/L — SIGNIFICANT CHANGE UP (ref 3.5–5.3)
RBC # BLD: 2.62 M/UL — LOW (ref 3.8–5.2)
RBC # FLD: 12.7 % — SIGNIFICANT CHANGE UP (ref 10.3–14.5)
SODIUM SERPL-SCNC: 136 MMOL/L — SIGNIFICANT CHANGE UP (ref 135–145)
SODIUM SERPL-SCNC: 136 MMOL/L — SIGNIFICANT CHANGE UP (ref 135–145)
SODIUM SERPL-SCNC: 137 MMOL/L — SIGNIFICANT CHANGE UP (ref 135–145)
SODIUM SERPL-SCNC: 138 MMOL/L — SIGNIFICANT CHANGE UP (ref 135–145)
SODIUM SERPL-SCNC: 140 MMOL/L — SIGNIFICANT CHANGE UP (ref 135–145)
TROPONIN T SERPL-MCNC: 1.18 NG/ML — HIGH (ref 0–0.06)
VANCOMYCIN TROUGH SERPL-MCNC: 7.9 UG/ML — LOW (ref 10–20)
WBC # BLD: 4.9 K/UL — SIGNIFICANT CHANGE UP (ref 3.8–10.5)
WBC # FLD AUTO: 4.9 K/UL — SIGNIFICANT CHANGE UP (ref 3.8–10.5)

## 2017-07-25 PROCEDURE — 99233 SBSQ HOSP IP/OBS HIGH 50: CPT

## 2017-07-25 PROCEDURE — 12345: CPT | Mod: NC

## 2017-07-25 PROCEDURE — 99233 SBSQ HOSP IP/OBS HIGH 50: CPT | Mod: GC

## 2017-07-25 RX ORDER — INSULIN HUMAN 100 [IU]/ML
2 INJECTION, SOLUTION SUBCUTANEOUS
Qty: 100 | Refills: 0 | Status: DISCONTINUED | OUTPATIENT
Start: 2017-07-25 | End: 2017-07-25

## 2017-07-25 RX ORDER — SODIUM CHLORIDE 9 MG/ML
250 INJECTION INTRAMUSCULAR; INTRAVENOUS; SUBCUTANEOUS ONCE
Qty: 0 | Refills: 0 | Status: COMPLETED | OUTPATIENT
Start: 2017-07-25 | End: 2017-07-25

## 2017-07-25 RX ORDER — INSULIN LISPRO 100/ML
1 VIAL (ML) SUBCUTANEOUS
Qty: 0 | Refills: 0 | Status: DISCONTINUED | OUTPATIENT
Start: 2017-07-25 | End: 2017-07-26

## 2017-07-25 RX ORDER — HEPARIN SODIUM 5000 [USP'U]/ML
INJECTION INTRAVENOUS; SUBCUTANEOUS
Qty: 25000 | Refills: 0 | Status: DISCONTINUED | OUTPATIENT
Start: 2017-07-25 | End: 2017-07-25

## 2017-07-25 RX ORDER — DEXTROSE 10 % IN WATER 10 %
1000 INTRAVENOUS SOLUTION INTRAVENOUS
Qty: 0 | Refills: 0 | Status: DISCONTINUED | OUTPATIENT
Start: 2017-07-25 | End: 2017-07-25

## 2017-07-25 RX ORDER — INSULIN HUMAN 100 [IU]/ML
1.5 INJECTION, SOLUTION SUBCUTANEOUS
Qty: 100 | Refills: 0 | Status: DISCONTINUED | OUTPATIENT
Start: 2017-07-25 | End: 2017-07-25

## 2017-07-25 RX ORDER — HEPARIN SODIUM 5000 [USP'U]/ML
1050 INJECTION INTRAVENOUS; SUBCUTANEOUS
Qty: 25000 | Refills: 0 | Status: DISCONTINUED | OUTPATIENT
Start: 2017-07-25 | End: 2017-07-26

## 2017-07-25 RX ORDER — VANCOMYCIN HCL 1 G
1500 VIAL (EA) INTRAVENOUS ONCE
Qty: 0 | Refills: 0 | Status: COMPLETED | OUTPATIENT
Start: 2017-07-25 | End: 2017-07-25

## 2017-07-25 RX ORDER — SODIUM CHLORIDE 9 MG/ML
1000 INJECTION, SOLUTION INTRAVENOUS
Qty: 0 | Refills: 0 | Status: DISCONTINUED | OUTPATIENT
Start: 2017-07-25 | End: 2017-07-25

## 2017-07-25 RX ORDER — INSULIN HUMAN 100 [IU]/ML
0.5 INJECTION, SOLUTION SUBCUTANEOUS
Qty: 100 | Refills: 0 | Status: DISCONTINUED | OUTPATIENT
Start: 2017-07-25 | End: 2017-07-25

## 2017-07-25 RX ORDER — ONDANSETRON 8 MG/1
4 TABLET, FILM COATED ORAL EVERY 6 HOURS
Qty: 0 | Refills: 0 | Status: DISCONTINUED | OUTPATIENT
Start: 2017-07-25 | End: 2017-08-03

## 2017-07-25 RX ORDER — CALCIUM GLUCONATE 100 MG/ML
1 VIAL (ML) INTRAVENOUS ONCE
Qty: 1 | Refills: 0 | Status: COMPLETED | OUTPATIENT
Start: 2017-07-25 | End: 2017-07-25

## 2017-07-25 RX ADMIN — DULOXETINE HYDROCHLORIDE 60 MILLIGRAM(S): 30 CAPSULE, DELAYED RELEASE ORAL at 11:04

## 2017-07-25 RX ADMIN — POTASSIUM PHOSPHATE, MONOBASIC POTASSIUM PHOSPHATE, DIBASIC 62.5 MILLIMOLE(S): 236; 224 INJECTION, SOLUTION INTRAVENOUS at 00:00

## 2017-07-25 RX ADMIN — SEVELAMER CARBONATE 2400 MILLIGRAM(S): 2400 POWDER, FOR SUSPENSION ORAL at 05:04

## 2017-07-25 RX ADMIN — INSULIN HUMAN 2 UNIT(S)/HR: 100 INJECTION, SOLUTION SUBCUTANEOUS at 04:07

## 2017-07-25 RX ADMIN — OXYCODONE AND ACETAMINOPHEN 1 TABLET(S): 5; 325 TABLET ORAL at 18:22

## 2017-07-25 RX ADMIN — INSULIN HUMAN 1 UNIT(S)/HR: 100 INJECTION, SOLUTION SUBCUTANEOUS at 06:05

## 2017-07-25 RX ADMIN — Medication 1 EACH: at 21:26

## 2017-07-25 RX ADMIN — Medication 1 EACH: at 16:00

## 2017-07-25 RX ADMIN — SODIUM CHLORIDE 500 MILLILITER(S): 9 INJECTION INTRAMUSCULAR; INTRAVENOUS; SUBCUTANEOUS at 00:41

## 2017-07-25 RX ADMIN — Medication 200 GRAM(S): at 10:10

## 2017-07-25 RX ADMIN — Medication 100 MILLIGRAM(S): at 21:15

## 2017-07-25 RX ADMIN — ONDANSETRON 4 MILLIGRAM(S): 8 TABLET, FILM COATED ORAL at 18:47

## 2017-07-25 RX ADMIN — SEVELAMER CARBONATE 2400 MILLIGRAM(S): 2400 POWDER, FOR SUSPENSION ORAL at 13:13

## 2017-07-25 RX ADMIN — Medication 300 MILLIGRAM(S): at 03:13

## 2017-07-25 RX ADMIN — Medication 81 MILLIGRAM(S): at 11:05

## 2017-07-25 RX ADMIN — Medication 1 EACH: at 19:00

## 2017-07-25 RX ADMIN — CINACALCET 60 MILLIGRAM(S): 30 TABLET, FILM COATED ORAL at 11:05

## 2017-07-25 RX ADMIN — INSULIN HUMAN 0.5 UNIT(S)/HR: 100 INJECTION, SOLUTION SUBCUTANEOUS at 02:00

## 2017-07-25 RX ADMIN — CLOPIDOGREL BISULFATE 75 MILLIGRAM(S): 75 TABLET, FILM COATED ORAL at 11:05

## 2017-07-25 RX ADMIN — ATORVASTATIN CALCIUM 20 MILLIGRAM(S): 80 TABLET, FILM COATED ORAL at 21:15

## 2017-07-25 RX ADMIN — Medication 100 MILLIGRAM(S): at 13:13

## 2017-07-25 RX ADMIN — OXYCODONE AND ACETAMINOPHEN 1 TABLET(S): 5; 325 TABLET ORAL at 18:07

## 2017-07-25 RX ADMIN — HEPARIN SODIUM 1050 UNIT(S)/HR: 5000 INJECTION INTRAVENOUS; SUBCUTANEOUS at 06:00

## 2017-07-25 RX ADMIN — INSULIN HUMAN 0.5 UNIT(S)/HR: 100 INJECTION, SOLUTION SUBCUTANEOUS at 07:12

## 2017-07-25 RX ADMIN — Medication 100 MILLIGRAM(S): at 05:04

## 2017-07-25 NOTE — ADVANCED PRACTICE NURSE CONSULT - ASSESSMENT
Pt’s most recent BMP shows a closed anion gap and serum glucose of 152 mg/dl.  However, pt initiated on D10 as per Dr. Almonte for AM FS of 123 mg/dl at 8am.  DKA policy states D5 should be started for FS of 200 less mg/dl but Dr. Almonte reports he did not want to cause fluid overload because pt has end stage renal disease.  Last  BGFS was 252 mg/dl at 10am.  Due to BG FS being over 200 mg/dl Dr. Colbert will hold off on starting insulin pump for now.  Insulin pump forms completed with patient and Dr. Almonte made aware that attending physician needs to sign insulin pump forms once patient restarts insulin pump. Pt’s  (Mr. Cui) reported that her current Novolog vial at home  in . Pt believes she demarcus developed antibodies to Novolog insulin and would like to be switched to Humalog insulin.  Pt consulted for T1DM and insulin pump therapy.  Pt has 3 sets of insulin pump supplies with her and retrieved insulin pump settings as follows;    Basal  12am – 0.325 mg/dl  5am – 0.425 units/hour    ICR  12am – 1:9  11am – 1:10  3pm – 1:9    ISF  12am – 60  7am – 40  6pm – 60    BG Target  12am – 110-130 mg/dl  8am – 100-120 mg/dl    Active insulin 6 hours

## 2017-07-25 NOTE — PROGRESS NOTE ADULT - ASSESSMENT
Pt is a 62F PMH DMI (on insulin pump), CAD ( s/p stents), ESRD on HD (T/Th/Sa), CVA with memory defecit p/w CP and high sugars seen at home Pt is a 62F PMH DMI (on insulin pump), CAD ( s/p stents), ESRD on HD (T/Th/Sa), CVA with memory defecit p/w CP and high sugars seen at home  #Neuro- Pt AOx3, non-focal exam  # CVS- No CP, off pressors. Required 250cc bolus overnight for hypotension. BP now improved.  - Pt has NSTEMI, with peaked Enzymes. C/w hep gtt/ c/w asa/plavix. F/u cardiology recs  - Check TTE  #Pulm- No active issues, patient satting 100% on RA  #GI- patient taking PO, denies nausea. On D5% in NS 2/2 liable FSG.   #Endo- gap has closed. Patient on insulin gtt .5 U/hr, D5 in NS at 50cc/hr, plan to transition to Insulin pump. Will speak to Endo this AM  # Renal- Patient received HD yest. No urgent indication for HD at this time  # DVT PPX- Hep GTT for NSTEMI

## 2017-07-25 NOTE — PROGRESS NOTE ADULT - PROBLEM SELECTOR PLAN 1
-would defer initiaion of insulin pump as bs have trended up into the high 200s. This is due to the patient eating b'fast and her gtt in D5 and D10. Primary team will change her heparin to NS and D10 has been stopped. Once patient has subsequent bs in the 100s without any electrolyte abnormalities, the insulin pump and gtt can be overlapped by 1-2 hours and then the insulin gtt can be stopped.  -Primary team and I will be in contact later to re-assess as bs improve.

## 2017-07-25 NOTE — PROGRESS NOTE ADULT - SUBJECTIVE AND OBJECTIVE BOX
Patient is a 62y old  Female who presents with a chief complaint of     SUBJECTIVE / OVERNIGHT EVENTS: feels better, awake , more alert  Review of Systems  chest pain no  palpitations no  sob no  nausea no  headache no    MEDICATIONS  (STANDING):  DULoxetine 60 milliGRAM(s) Oral daily  atorvastatin 20 milliGRAM(s) Oral at bedtime  clopidogrel Tablet 75 milliGRAM(s) Oral daily  metoprolol succinate ER 50 milliGRAM(s) Oral daily  aspirin enteric coated 81 milliGRAM(s) Oral daily  cinacalcet 60 milliGRAM(s) Oral daily  sevelamer hydrochloride 2400 milliGRAM(s) Oral every 8 hours  hydrALAZINE 100 milliGRAM(s) Oral every 8 hours  dextrose 50% Injectable 50 milliLiter(s) IV Push every 15 minutes  dextrose 50% Injectable 25 milliLiter(s) IV Push every 15 minutes  senna 2 Tablet(s) Oral at bedtime  docusate sodium 100 milliGRAM(s) Oral three times a day  heparin  Infusion. 1050 Unit(s)/Hr (10.5 mL/Hr) IV Continuous <Continuous>  insulin lispro (HumaLOG) Pump 1 Each SubCutaneous Continuous Pump    MEDICATIONS  (PRN):  oxyCODONE    5 mG/acetaminophen 325 mG 1 Tablet(s) Oral every 6 hours PRN Severe Pain (7 - 10)  acetaminophen   Tablet. 650 milliGRAM(s) Oral every 6 hours PRN Mild and/or moderate Pain          PHYSICAL EXAM:  GENERAL: NAD, well-developed  HEAD:  Atraumatic, Normocephalic  EYES: EOMI, PERRLA, conjunctiva and sclera clear  NECK: Supple, No JVD  CHEST/LUNG: Clear to auscultation bilaterally; No wheeze  HEART: Regular rate and rhythm; No murmurs, rubs, or gallops  ABDOMEN: Soft, Nontender, Nondistended; Bowel sounds present  EXTREMITIES:  2+ Peripheral Pulses, No clubbing, cyanosis, or edema  PSYCH: AAOx3  NEUROLOGY: non-focal  SKIN: No rashes or lesions    LABS:                        9.0    4.9   )-----------( 231      ( 25 Jul 2017 00:13 )             27.2     07-25    136  |  97  |  16  ----------------------------<  249<H>  4.8   |  25  |  4.32<H>    Ca    7.7<L>      25 Jul 2017 13:27  Phos  4.5     07-25  Mg     2.0     07-25    TPro  6.7  /  Alb  3.9  /  TBili  0.3  /  DBili  x   /  AST  25  /  ALT  12  /  AlkPhos  248<H>  07-24    PT/INR - ( 24 Jul 2017 22:47 )   PT: 11.0 sec;   INR: 1.01 ratio         PTT - ( 25 Jul 2017 05:06 )  PTT:59.7 sec  CARDIAC MARKERS ( 25 Jul 2017 00:14 )  x     / 1.18 ng/mL / 173 U/L / x     / 10.9 ng/mL  CARDIAC MARKERS ( 24 Jul 2017 16:36 )  x     / 1.28 ng/mL / 187 U/L / x     / 13.5 ng/mL  CARDIAC MARKERS ( 24 Jul 2017 09:52 )  x     / 1.23 ng/mL / 212 U/L / x     / 16.1 ng/mL  CARDIAC MARKERS ( 23 Jul 2017 23:46 )  x     / 0.43 ng/mL / 228 U/L / x     / 13.0 ng/mL          RADIOLOGY & ADDITIONAL TESTS:    Imaging Personally Reviewed:    Consultant(s) Notes Reviewed:      Care Discussed with Consultants/Other Providers:

## 2017-07-25 NOTE — PROGRESS NOTE ADULT - ASSESSMENT
63 yo female with hx of T1DM controlled with pvd s/p fem-pop b/l, retinopathy, CAD, ESRD on HD, and neuropathy here with DKA and NSTEMI.

## 2017-07-25 NOTE — CHART NOTE - NSCHARTNOTEFT_GEN_A_CORE
MICU Transfer Note    Transfer from: MICU  Transfer to: ( X ) Medicine    (  ) Telemetry    (   ) RCU    (    ) Palliative    (   ) Stroke Unit  Accepting Physician:       MICU COURSE:   62F with PMHx T1DM on insulin pump with mult complications including ESRD on HD (Tu/Th/Sat), retinopathy, neuropathy, HTN, HLD, former smoker, gout, CAD s/p PCI on ASA and Plavix, dCHF, severe PVD s/p L&R fem-pop bypass graft, CVA, depression, chronic pain on oxycodone and multiple past admissions for DKA. Presented to ED from home on 7/23/17 with chief complaint of mid-sternal chest pain, found to have blood glucose >900. She was admitted to MICU on insulin gtt for further DKA management and r/o ACS.     In the MICU, cardiac enzymes initially positive but peaked and decreasing. IV heparin initiated as per ACS protocol; patient already on DAPT with ASA and Plavix. Endocrinology was consulted.       ASSESSMENT & PLAN:   62 year-old female with T1DM on insulin pump, HTN, HLD, PVD, CAD, ESRD on HD, and neuropathy who presented in DKA and NSTEMI.     # DKA:   -Fingerstick glucose under better control, now in 100s  -Endocrinology will continue to follow    # NSTEMI:  -continue ASA, Plavix  -continue hep gtt for 48 hrs total    # HTN:  -Continue hydralazine, beta blocker        Vital Signs Last 24 Hrs:  T(C): 36.8 (25 Jul 2017 16:00), Max: 37.3 (25 Jul 2017 04:00)  T(F): 98.2 (25 Jul 2017 16:00), Max: 99.2 (25 Jul 2017 04:00)  HR: 82 (25 Jul 2017 18:00) (66 - 93)  BP: 86/39 (25 Jul 2017 13:00) (83/43 - 113/57)  BP(mean): 56 (25 Jul 2017 13:00) (56 - 82)  RR: 23 (25 Jul 2017 18:00) (9 - 23)  SpO2: 100% (25 Jul 2017 18:00) (88% - 100%)      I&O's Summary:    24 Jul 2017 07:01  -  25 Jul 2017 07:00  --------------------------------------------------------  IN: 2610 mL / OUT: 3100 mL / NET: -490 mL    25 Jul 2017 07:01  -  25 Jul 2017 18:14  --------------------------------------------------------  IN: 816 mL / OUT: 10 mL / NET: 806 mL      Labs:                                       9.0      (07-25 @ 00:13)    4.9 )------------( 231                                           27.2                                  136   |  97     |  16                    Calcium: 7.7  (07-25 @ 13:27)    ------------------------------<  249       Magnesium: 2.0                                4.8    |  25     |  4.32                 Phosphorous: 4.5        ( 07-25 @ 05:06 )   aPTT: 59.7 sec MICU Transfer Note    Transfer from: MICU  Transfer to: ( X ) Medicine    (  ) Telemetry    (   ) RCU    (    ) Palliative    (   ) Stroke Unit  Accepting Physician: Aylin      MICU COURSE:   62F with PMHx T1DM on insulin pump with mult complications including ESRD on HD (Tu/Th/Sat), retinopathy, neuropathy, HTN, HLD, former smoker, gout, CAD s/p PCI on ASA and Plavix, dCHF, severe PVD s/p L&R fem-pop bypass graft, CVA, depression, chronic pain on oxycodone and multiple past admissions for DKA. Presented to ED from home on 7/23/17 with chief complaint of mid-sternal chest pain, found to have blood glucose >900. She was admitted to MICU on insulin gtt for further DKA management and r/o ACS.     In the MICU, cardiac enzymes initially positive but peaked and decreasing. IV heparin initiated as per ACS protocol; patient already on DAPT with ASA and Plavix. Endocrinology was consulted. Patient was transitioned off Insulin GTT and onto her insulin pump when her AG closed.      ASSESSMENT & PLAN:   62 year-old female with T1DM on insulin pump, HTN, HLD, PVD, CAD, ESRD on HD, and neuropathy who presented in DKA and NSTEMI.     # DKA:   -Fingerstick glucose under better control, now in 100s  -Endocrinology will continue to follow    # NSTEMI:  -continue ASA, Plavix  -continue hep gtt for 48 hrs total    # HTN:  -Continue hydralazine, beta blocker        Vital Signs Last 24 Hrs:  T(C): 36.8 (25 Jul 2017 16:00), Max: 37.3 (25 Jul 2017 04:00)  T(F): 98.2 (25 Jul 2017 16:00), Max: 99.2 (25 Jul 2017 04:00)  HR: 82 (25 Jul 2017 18:00) (66 - 93)  BP: 86/39 (25 Jul 2017 13:00) (83/43 - 113/57)  BP(mean): 56 (25 Jul 2017 13:00) (56 - 82)  RR: 23 (25 Jul 2017 18:00) (9 - 23)  SpO2: 100% (25 Jul 2017 18:00) (88% - 100%)      I&O's Summary:    24 Jul 2017 07:01  - 25 Jul 2017 07:00  --------------------------------------------------------  IN: 2610 mL / OUT: 3100 mL / NET: -490 mL    25 Jul 2017 07:01  -  25 Jul 2017 18:14  --------------------------------------------------------  IN: 816 mL / OUT: 10 mL / NET: 806 mL      Labs:                                       9.0      (07-25 @ 00:13)    4.9 )------------( 231                                           27.2                                  136   |  97     |  16                    Calcium: 7.7  (07-25 @ 13:27)    ------------------------------<  249       Magnesium: 2.0                                4.8    |  25     |  4.32                 Phosphorous: 4.5        ( 07-25 @ 05:06 )   aPTT: 59.7 sec MICU Transfer Note    Transfer from: MICU  Transfer to: ( X ) Medicine    (  X) Telemetry    (   ) RCU    (    ) Palliative    (   ) Stroke Unit  Accepting Physician: Aylin      MICU COURSE:   62F with PMHx T1DM on insulin pump with mult complications including ESRD on HD (Tu/Th/Sat), retinopathy, neuropathy, HTN, HLD, former smoker, gout, CAD s/p PCI on ASA and Plavix, dCHF, severe PVD s/p L&R fem-pop bypass graft, CVA, depression, chronic pain on oxycodone and multiple past admissions for DKA. Presented to ED from home on 7/23/17 with chief complaint of mid-sternal chest pain, found to have blood glucose >900. She was admitted to MICU on insulin gtt for further DKA management and r/o ACS.     In the MICU, cardiac enzymes initially positive but peaked and decreasing. IV heparin initiated as per ACS protocol; patient already on DAPT with ASA and Plavix. Endocrinology was consulted. Patient was transitioned off Insulin GTT and onto her insulin pump when her AG closed.      ASSESSMENT & PLAN:   62 year-old female with T1DM on insulin pump, HTN, HLD, PVD, CAD, ESRD on HD, and neuropathy who presented in DKA and NSTEMI.     # DKA:   -Fingerstick glucose under better control, now in 100s  -Endocrinology will continue to follow    # NSTEMI:  -continue ASA, Plavix  -continue hep gtt for 48 hrs total ( Stop tomorrow -7/26)    # HTN:  -Continue hydralazine, beta blocker        Vital Signs Last 24 Hrs:  T(C): 36.8 (25 Jul 2017 16:00), Max: 37.3 (25 Jul 2017 04:00)  T(F): 98.2 (25 Jul 2017 16:00), Max: 99.2 (25 Jul 2017 04:00)  HR: 82 (25 Jul 2017 18:00) (66 - 93)  BP: 86/39 (25 Jul 2017 13:00) (83/43 - 113/57)  BP(mean): 56 (25 Jul 2017 13:00) (56 - 82)  RR: 23 (25 Jul 2017 18:00) (9 - 23)  SpO2: 100% (25 Jul 2017 18:00) (88% - 100%)      I&O's Summary:    24 Jul 2017 07:01  -  25 Jul 2017 07:00  --------------------------------------------------------  IN: 2610 mL / OUT: 3100 mL / NET: -490 mL    25 Jul 2017 07:01  -  25 Jul 2017 18:14  --------------------------------------------------------  IN: 816 mL / OUT: 10 mL / NET: 806 mL      Labs:                                       9.0      (07-25 @ 00:13)    4.9 )------------( 231                                           27.2                                  136   |  97     |  16                    Calcium: 7.7  (07-25 @ 13:27)    ------------------------------<  249       Magnesium: 2.0                                4.8    |  25     |  4.32                 Phosphorous: 4.5        ( 07-25 @ 05:06 )   aPTT: 59.7 sec

## 2017-07-25 NOTE — PROGRESS NOTE ADULT - SUBJECTIVE AND OBJECTIVE BOX
S: Patient feeling better. Tolerated eating breakfast. BS improved overnight as pt got HD. Team started D10 at 20cc/hr and she also has heparin and IV abx in D5 so bs are trending upwards this am.    MEDICATIONS  (STANDING):  DULoxetine 60 milliGRAM(s) Oral daily  atorvastatin 20 milliGRAM(s) Oral at bedtime  clopidogrel Tablet 75 milliGRAM(s) Oral daily  metoprolol succinate ER 50 milliGRAM(s) Oral daily  aspirin enteric coated 81 milliGRAM(s) Oral daily  cinacalcet 60 milliGRAM(s) Oral daily  sevelamer hydrochloride 2400 milliGRAM(s) Oral every 8 hours  hydrALAZINE 100 milliGRAM(s) Oral every 8 hours  dextrose 50% Injectable 50 milliLiter(s) IV Push every 15 minutes  dextrose 50% Injectable 25 milliLiter(s) IV Push every 15 minutes  senna 2 Tablet(s) Oral at bedtime  docusate sodium 100 milliGRAM(s) Oral three times a day  insulin Infusion 0.5 Unit(s)/Hr (0.5 mL/Hr) IV Continuous <Continuous>  heparin  Infusion. 1050 Unit(s)/Hr (10.5 mL/Hr) IV Continuous <Continuous>    MEDICATIONS  (PRN):  oxyCODONE    5 mG/acetaminophen 325 mG 1 Tablet(s) Oral every 6 hours PRN Severe Pain (7 - 10)  acetaminophen   Tablet. 650 milliGRAM(s) Oral every 6 hours PRN Mild and/or moderate Pain      PHYSICAL EXAM:  VITALS: T(C): 36.8 (07-25-17 @ 12:00)  T(F): 98.3 (07-25-17 @ 12:00), Max: 99.2 (07-25-17 @ 04:00)  HR: 78 (07-25-17 @ 12:00) (66 - 93)  BP: 113/57 (07-25-17 @ 12:00) (83/43 - 113/57)  RR:  (9 - 21)  SpO2:  (88% - 100%)  Wt(kg): --  GENERAL: NAD, well-groomed, well-developed  EYES: No proptosis, no injection  HEENT:  Atraumatic, Normocephalic, moist mucous membranes, missing several front teeth  RESPIRATORY: Clear to auscultation bilaterally; No rales, rhonchi, wheezing, or rubs  CARDIOVASCULAR: Regular rate and rhythm; No murmurs  GI: Soft, nontender, non distended, normal bowel sounds    CAPILLARY BLOOD GLUCOSE  262 (07-25 @ 12:00)  258 (07-25 @ 11:00)  252 (07-25 @ 10:00)  179 (07-25 @ 09:00)  123 (07-25 @ 08:00)  142 (07-25 @ 07:00)  140 (07-25 @ 06:00)  165 (07-25 @ 05:00)  174 (07-25 @ 04:00)  93 (07-25 @ 03:00)  98 (07-25 @ 02:00)  115 (07-25 @ 01:00)  148 (07-25 @ 00:00)  176 (07-24 @ 23:00)  129 (07-24 @ 22:00)  119 (07-24 @ 21:00)  107 (07-24 @ 20:00)  109 (07-24 @ 19:00)  119 (07-24 @ 18:00)  133 (07-24 @ 17:00)  164 (07-24 @ 16:00)  214 (07-24 @ 15:00)  261 (07-24 @ 14:00)  228 (07-24 @ 13:00)  216 (07-24 @ 12:00)  201 (07-24 @ 11:00)  185 (07-24 @ 10:00)  179 (07-24 @ 09:00)  123 (07-24 @ 08:00)  138 (07-24 @ 07:00)  147 (07-24 @ 06:00)  235 (07-24 @ 05:00)  299 (07-24 @ 04:00)  376 (07-24 @ 03:00)  494 (07-24 @ 02:00)  508 (07-24 @ 01:08)      07-25    137  |  98  |  15  ----------------------------<  152<H>  5.0   |  24  |  3.88<H>    EGFR if : 14<L>  EGFR if non : 12<L>    Ca    7.8<L>      07-25  Mg     2.1     07-25  Phos  4.3     07-25    TPro  6.7  /  Alb  3.9  /  TBili  0.3  /  DBili  x   /  AST  25  /  ALT  12  /  AlkPhos  248<H>  07-24      Hemoglobin A1C, Whole Blood: 6.8 % <H> [4.0 - 5.6] (07-24-17 @ 06:15)  Hemoglobin A1C, Whole Blood: 6.8 % <H> [4.0 - 5.6] (07-23-17 @ 22:45)  Hemoglobin A1C, Whole Blood: 7.3 % <H> [4.0 - 5.6] (06-16-17 @ 04:45)

## 2017-07-25 NOTE — PROGRESS NOTE ADULT - SUBJECTIVE AND OBJECTIVE BOX
[facility]   [phone]  RENAL FOLLOW UP NOTE  --------------------------------------------------------------------------------  Chief Complaint:    24 hour events/subjective:    States feels better.  Is hungry.  No complaint of headache, dizziness.Has pain in left foot    PAST HISTORY  --------------------------------------------------------------------------------  No significant changes to PMH, PSH, FHx, SHx, unless otherwise noted    ALLERGIES & MEDICATIONS  --------------------------------------------------------------------------------  Allergies    No Known Allergies    Intolerances      Standing Inpatient Medications  DULoxetine 60 milliGRAM(s) Oral daily  atorvastatin 20 milliGRAM(s) Oral at bedtime  clopidogrel Tablet 75 milliGRAM(s) Oral daily  metoprolol succinate ER 50 milliGRAM(s) Oral daily  aspirin enteric coated 81 milliGRAM(s) Oral daily  cinacalcet 60 milliGRAM(s) Oral daily  sevelamer hydrochloride 2400 milliGRAM(s) Oral every 8 hours  hydrALAZINE 100 milliGRAM(s) Oral every 8 hours  dextrose 50% Injectable 50 milliLiter(s) IV Push every 15 minutes  dextrose 50% Injectable 25 milliLiter(s) IV Push every 15 minutes  senna 2 Tablet(s) Oral at bedtime  docusate sodium 100 milliGRAM(s) Oral three times a day  heparin  Infusion.  Unit(s)/Hr IV Continuous <Continuous>  insulin Infusion 0.5 Unit(s)/Hr IV Continuous <Continuous>  dextrose 10%. 1000 milliLiter(s) IV Continuous <Continuous>    PRN Inpatient Medications  oxyCODONE    5 mG/acetaminophen 325 mG 1 Tablet(s) Oral every 6 hours PRN  acetaminophen   Tablet. 650 milliGRAM(s) Oral every 6 hours PRN      REVIEW OF SYSTEMS  --------------------------------------------------------------------------------    Gen: denies fevers/chills,  CVS: denies chest pain/palpitations  Resp: denies SOB/Cough  GI: Denies N/V/Abd pain  States edema in the left leg is better.  Does have pain in the foot.  Left foot.  All other systems were reviewed and are negative, except as noted.    VITALS/PHYSICAL EXAM  --------------------------------------------------------------------------------  T(C): 37.3 (07-25-17 @ 04:00), Max: 37.3 (07-25-17 @ 04:00)  HR: 88 (07-25-17 @ 10:00) (66 - 93)  BP: 101/51 (07-25-17 @ 10:00) (83/43 - 104/51)  RR: 18 (07-25-17 @ 10:00) (9 - 21)  SpO2: 94% (07-25-17 @ 10:00) (88% - 100%)  Wt(kg): --  Height (cm): 153.41 (07-24-17 @ 01:08)  Weight (kg): 61.4 (07-24-17 @ 01:08)  BMI (kg/m2): 26.1 (07-24-17 @ 01:08)  BSA (m2): 1.59 (07-24-17 @ 01:08)      07-24-17 @ 07:01  -  07-25-17 @ 07:00  --------------------------------------------------------  IN: 2610 mL / OUT: 3100 mL / NET: -490 mL    07-25-17 @ 07:01  -  07-25-17 @ 10:27  --------------------------------------------------------  IN: 384 mL / OUT: 0 mL / NET: 384 mL      Physical Exam:  	    Physical Exam:  	Gen: alert oriented place person and date   	Pulm: Decreased breath sounds b/l bases. no rales or ronchi or wheezing  	CV: RRR, S1/S2. no rub  	Back: No CVA tenderness; no sacral edema  	Abd: +BS, soft, nontender/nondistended  	: No suprapubic tenderness.               Extremity: No cyanosis, Left lower extremity decrease 1+ edema  no clubbing  	Psych: Normal affect and mood      LABS/STUDIES  --------------------------------------------------------------------------------              9.0    4.9   >-----------<  231      [07-25-17 @ 00:13]              27.2     137  |  98  |  15  ----------------------------<  152      [07-25-17 @ 09:03]  5.0   |  24  |  3.88        Ca     7.8     [07-25-17 @ 09:03]      Mg     2.1     [07-25-17 @ 09:03]      Phos  4.3     [07-25-17 @ 09:03]    TPro  6.7  /  Alb  3.9  /  TBili  0.3  /  DBili  x   /  AST  25  /  ALT  12  /  AlkPhos  248  [07-24-17 @ 01:54]    PT/INR: PT 11.0 , INR 1.01       [07-24-17 @ 22:47]  PTT: 59.7       [07-25-17 @ 05:06]    Troponin 1.18      [07-25-17 @ 00:14]        [07-25-17 @ 00:14]  Serum Osmolality 304      [07-24-17 @ 02:36]    Creatinine Trend:  SCr 3.88 [07-25 @ 09:03]  SCr 3.59 [07-25 @ 04:28]  SCr 3.33 [07-25 @ 00:14]  SCr 2.93 [07-24 @ 22:47]  SCr 7.40 [07-24 @ 18:18]                  HbA1c 6.8      [07-24-17 @ 06:15]

## 2017-07-25 NOTE — PROGRESS NOTE ADULT - ASSESSMENT
60 yo F with DM, ESRD. PVD with recurrent DKA/hyperlgycemia. has hx of LLE bypass as well  1- DM/DKA  2- ESRD  3- recent hyperkalemia  4- NSTEMI  5- htn     HD in him.  Hydralazine 100 mg every 8.  Continue with metoprolol ER 50 mg daily.  Insulin drip 0.5 units per hour.  NSTEMI- Aspirin as well as Plavix to continue.  Secondary hyperparathyroid Sensipar 60 mg daily.  Renvela 3 tablets with meals.  Endocrinology followup

## 2017-07-25 NOTE — ADVANCED PRACTICE NURSE CONSULT - RECOMMEDATIONS
Will hold off on starting insulin pump until BG FS within defined limits. Will follow up with Dr. Colbert regarding pt wanting to be switched from Novolog insulin to Humalog insulin. Will hold off on starting insulin pump until BG FS remain stable. Will follow up with Dr. Colbert regarding pt wanting to be switched from Novolog insulin to Humalog insulin.

## 2017-07-25 NOTE — PROGRESS NOTE ADULT - ASSESSMENT
62 f with DKA / diabetes Mellitus type 1  BS control/ endocrine follow.  s/p NSTEMI-AC, asa/Plavix. Cardiology follow, pain control  ESRD- continue HD  Peripheral Vascular disease- conservative management.  ICU care  Pierce Viera MD pager [phone]

## 2017-07-25 NOTE — PROGRESS NOTE ADULT - SUBJECTIVE AND OBJECTIVE BOX
CHIEF COMPLAINT:    Interval Events:    REVIEW OF SYSTEMS:  Constitutional: [ ] negative [ ] fevers [ ] chills [ ] weight loss [ ] weight gain  HEENT: [ ] negative [ ] dry eyes [ ] eye irritation [ ] postnasal drip [ ] nasal congestion  CV: [ ] negative  [ ] chest pain [ ] orthopnea [ ] palpitations [ ] murmur  Resp: [ ] negative [ ] cough [ ] shortness of breath [ ] dyspnea [ ] wheezing [ ] sputum [ ] hemoptysis  GI: [ ] negative [ ] nausea [ ] vomiting [ ] diarrhea [ ] constipation [ ] abd pain [ ] dysphagia   : [ ] negative [ ] dysuria [ ] nocturia [ ] hematuria [ ] increased urinary frequency  Musculoskeletal: [ ] negative [ ] back pain [ ] myalgias [ ] arthralgias [ ] fracture  Skin: [ ] negative [ ] rash [ ] itch  Neurological: [ ] negative [ ] headache [ ] dizziness [ ] syncope [ ] weakness [ ] numbness  Psychiatric: [ ] negative [ ] anxiety [ ] depression  Endocrine: [ ] negative [ ] diabetes [ ] thyroid problem  Hematologic/Lymphatic: [ ] negative [ ] anemia [ ] bleeding problem  Allergic/Immunologic: [ ] negative [ ] itchy eyes [ ] nasal discharge [ ] hives [ ] angioedema  [ ] All other systems negative  [ ] Unable to assess ROS because ________    OBJECTIVE:  ICU Vital Signs Last 24 Hrs  T(C): 37.3 (25 Jul 2017 04:00), Max: 37.3 (25 Jul 2017 04:00)  T(F): 99.2 (25 Jul 2017 04:00), Max: 99.2 (25 Jul 2017 04:00)  HR: 76 (25 Jul 2017 07:00) (66 - 93)  BP: 96/47 (25 Jul 2017 07:00) (89/43 - 104/51)  BP(mean): 68 (25 Jul 2017 07:00) (62 - 76)  ABP: 111/39 (25 Jul 2017 07:00) (98/34 - 145/47)  ABP(mean): 65 (25 Jul 2017 07:00) (57 - 82)  RR: 15 (25 Jul 2017 07:00) (9 - 22)  SpO2: 100% (25 Jul 2017 07:00) (88% - 100%)        07-24 @ 07:01  -  07-25 @ 07:00  --------------------------------------------------------  IN: 2610 mL / OUT: 3100 mL / NET: -490 mL      CAPILLARY BLOOD GLUCOSE  142 (25 Jul 2017 07:00)          PHYSICAL EXAM:  General:   HEENT:   Lymph Nodes:  Neck:   Respiratory:   Cardiovascular:   Abdomen:   Extremities:   Skin:   Neurological:  Psychiatry:    LINES:    HOSPITAL MEDICATIONS:  clopidogrel Tablet 75 milliGRAM(s) Oral daily  aspirin enteric coated 81 milliGRAM(s) Oral daily  heparin  Infusion.  Unit(s)/Hr IV Continuous <Continuous>    piperacillin/tazobactam IVPB. 3.375 Gram(s) IV Intermittent every 12 hours    metoprolol succinate ER 50 milliGRAM(s) Oral daily  hydrALAZINE 100 milliGRAM(s) Oral every 8 hours    atorvastatin 20 milliGRAM(s) Oral at bedtime  dextrose 50% Injectable 50 milliLiter(s) IV Push every 15 minutes  dextrose 50% Injectable 25 milliLiter(s) IV Push every 15 minutes  insulin Infusion 0.5 Unit(s)/Hr IV Continuous <Continuous>      DULoxetine 60 milliGRAM(s) Oral daily  oxyCODONE    5 mG/acetaminophen 325 mG 1 Tablet(s) Oral every 6 hours PRN  acetaminophen   Tablet. 650 milliGRAM(s) Oral every 6 hours PRN    senna 2 Tablet(s) Oral at bedtime  docusate sodium 100 milliGRAM(s) Oral three times a day        dextrose 5% + sodium chloride 0.9%. 1000 milliLiter(s) IV Continuous <Continuous>        cinacalcet 60 milliGRAM(s) Oral daily  sevelamer hydrochloride 2400 milliGRAM(s) Oral every 8 hours          LABS:                        9.0    4.9   )-----------( 231      ( 25 Jul 2017 00:13 )             27.2     Hgb Trend: 9.0<--, 8.3<--, 8.4<--, 9.9<--  07-25    138  |  98  |  13  ----------------------------<  205<H>  4.4   |  27  |  3.59<H>    Ca    7.3<L>      25 Jul 2017 04:28  Phos  4.1     07-25  Mg     2.0     07-25    TPro  6.7  /  Alb  3.9  /  TBili  0.3  /  DBili  x   /  AST  25  /  ALT  12  /  AlkPhos  248<H>  07-24    Creatinine Trend: 3.59<--, 3.33<--, 2.93<--, 7.40<--, 7.10<--, 6.68<--  PT/INR - ( 24 Jul 2017 22:47 )   PT: 11.0 sec;   INR: 1.01 ratio         PTT - ( 25 Jul 2017 05:06 )  PTT:59.7 sec    Arterial Blood Gas:  07-24 @ 13:56  7.35/46/145/25/99/-.2  ABG lactate: --  Arterial Blood Gas:  07-24 @ 09:30  7.35/48/101/26/98/1.0  ABG lactate: --  Arterial Blood Gas:  07-24 @ 06:04  7.39/46/173/28/100/2.8  ABG lactate: --  Arterial Blood Gas:  07-24 @ 02:52  7.39/46/134/27/100/2.3  ABG lactate: --    Venous Blood Gas:  07-24 @ 01:53  7.40/42/117/25/99  VBG Lactate: 3.7  Venous Blood Gas:  07-23 @ 18:00  7.24/45/25/19/29  VBG Lactate: 2.4      MICROBIOLOGY:     RADIOLOGY:  [ ] Reviewed and interpreted by me    EKG: CHIEF COMPLAINT: DKA, chest pain    Interval Events: Overnight pt hypotensive- given 250cc/hr . No complaints this a.m. No further chest pain.    REVIEW OF SYSTEMS:  Constitutional: [ ] negative [ ] fevers [ ] chills [ ] weight loss [ ] weight gain  HEENT: [ ] negative [ ] dry eyes [ ] eye irritation [ ] postnasal drip [ ] nasal congestion  CV: [ ] negative  [ ] chest pain [ ] orthopnea [ ] palpitations [ ] murmur  Resp: [ ] negative [ ] cough [ ] shortness of breath [ ] dyspnea [ ] wheezing [ ] sputum [ ] hemoptysis  GI: [ ] negative [ ] nausea [ ] vomiting [ ] diarrhea [ ] constipation [ ] abd pain [ ] dysphagia   : [ ] negative [ ] dysuria [ ] nocturia [ ] hematuria [ ] increased urinary frequency  Musculoskeletal: [ ] negative [ ] back pain [ ] myalgias [ ] arthralgias [ ] fracture  Skin: [ ] negative [ ] rash [ ] itch  Neurological: [ ] negative [ ] headache [ ] dizziness [ ] syncope [ ] weakness [ ] numbness  Psychiatric: [ ] negative [ ] anxiety [ ] depression  Endocrine: [ ] negative [ ] diabetes [ ] thyroid problem  Hematologic/Lymphatic: [ ] negative [ ] anemia [ ] bleeding problem  Allergic/Immunologic: [ ] negative [ ] itchy eyes [ ] nasal discharge [ ] hives [ ] angioedema  [X ] All other systems negative  [ ] Unable to assess ROS because ________    OBJECTIVE:  ICU Vital Signs Last 24 Hrs  T(C): 37.3 (25 Jul 2017 04:00), Max: 37.3 (25 Jul 2017 04:00)  T(F): 99.2 (25 Jul 2017 04:00), Max: 99.2 (25 Jul 2017 04:00)  HR: 76 (25 Jul 2017 07:00) (66 - 93)  BP: 96/47 (25 Jul 2017 07:00) (89/43 - 104/51)  BP(mean): 68 (25 Jul 2017 07:00) (62 - 76)  ABP: 111/39 (25 Jul 2017 07:00) (98/34 - 145/47)  ABP(mean): 65 (25 Jul 2017 07:00) (57 - 82)  RR: 15 (25 Jul 2017 07:00) (9 - 22)  SpO2: 100% (25 Jul 2017 07:00) (88% - 100%)        07-24 @ 07:01  -  07-25 @ 07:00  --------------------------------------------------------  IN: 2610 mL / OUT: 3100 mL / NET: -490 mL      CAPILLARY BLOOD GLUCOSE  142 (25 Jul 2017 07:00)        PHYSICAL EXAM:  General: NAD  HEENT: EOMI, PERRLA  Neck: Supple, no JVD  Respiratory: CTA b/l No WRR  Cardiovascular: RRR s1/s2 No MGR  Abdomen: S/NT/ND BS+  Extremities: No clubbing, nocyanosis  Skin: warm, dry  Neurological: non-focal  Psychiatry: AOX3    LINES: A-Line 7/23      [facility] MEDICATIONS:  clopidogrel Tablet 75 milliGRAM(s) Oral daily  aspirin enteric coated 81 milliGRAM(s) Oral daily  heparin  Infusion.  Unit(s)/Hr IV Continuous <Continuous>    piperacillin/tazobactam IVPB. 3.375 Gram(s) IV Intermittent every 12 hours    metoprolol succinate ER 50 milliGRAM(s) Oral daily  hydrALAZINE 100 milliGRAM(s) Oral every 8 hours    atorvastatin 20 milliGRAM(s) Oral at bedtime  dextrose 50% Injectable 50 milliLiter(s) IV Push every 15 minutes  dextrose 50% Injectable 25 milliLiter(s) IV Push every 15 minutes  insulin Infusion 0.5 Unit(s)/Hr IV Continuous <Continuous>      DULoxetine 60 milliGRAM(s) Oral daily  oxyCODONE    5 mG/acetaminophen 325 mG 1 Tablet(s) Oral every 6 hours PRN  acetaminophen   Tablet. 650 milliGRAM(s) Oral every 6 hours PRN    senna 2 Tablet(s) Oral at bedtime  docusate sodium 100 milliGRAM(s) Oral three times a day        dextrose 5% + sodium chloride 0.9%. 1000 milliLiter(s) IV Continuous <Continuous>        cinacalcet 60 milliGRAM(s) Oral daily  sevelamer hydrochloride 2400 milliGRAM(s) Oral every 8 hours          LABS:                        9.0    4.9   )-----------( 231      ( 25 Jul 2017 00:13 )             27.2     Hgb Trend: 9.0<--, 8.3<--, 8.4<--, 9.9<--  07-25    138  |  98  |  13  ----------------------------<  205<H>  4.4   |  27  |  3.59<H>    Ca    7.3<L>      25 Jul 2017 04:28  Phos  4.1     07-25  Mg     2.0     07-25    TPro  6.7  /  Alb  3.9  /  TBili  0.3  /  DBili  x   /  AST  25  /  ALT  12  /  AlkPhos  248<H>  07-24    Creatinine Trend: 3.59<--, 3.33<--, 2.93<--, 7.40<--, 7.10<--, 6.68<--  PT/INR - ( 24 Jul 2017 22:47 )   PT: 11.0 sec;   INR: 1.01 ratio         PTT - ( 25 Jul 2017 05:06 )  PTT:59.7 sec    Arterial Blood Gas:  07-24 @ 13:56  7.35/46/145/25/99/-.2  ABG lactate: --  Arterial Blood Gas:  07-24 @ 09:30  7.35/48/101/26/98/1.0  ABG lactate: --  Arterial Blood Gas:  07-24 @ 06:04  7.39/46/173/28/100/2.8  ABG lactate: --  Arterial Blood Gas:  07-24 @ 02:52  7.39/46/134/27/100/2.3  ABG lactate: --    Venous Blood Gas:  07-24 @ 01:53  7.40/42/117/25/99  VBG Lactate: 3.7  Venous Blood Gas:  07-23 @ 18:00  7.24/45/25/19/29  VBG Lactate: 2.4

## 2017-07-25 NOTE — ADVANCED PRACTICE NURSE CONSULT - REASON FOR CONSULT
Certified Diabetes Educator     61 y/o female with hx of T1DM controlled (HbA1C 6.8%) x 41 years with pvd s/p fem-pop b/l, retinopathy, CVA CAD, ESRD on HD, and neuropathy here with DKA and acute cp x 2 hours which awakened pt from her sleep. The pain was L sided and radiating to her L arm and back associated with sob and blurred vision, no dizziness or palpitations - better with asa and nothing made it worse, She describes it as "heart attack pain". When she checked her BG FS her monitor said "HIGH". She had bolused herself 8.7 units of insulin 2 hours prior when she ate some watermelon, which she vomited up when she had the cp. Pt followed by Dr. Ley (Endocrinologist) of [facility]. Pt’s  (Mr. Cui) reported that her current Novolog vial at home  in . Pt consulted for T1DM and insulin pump therapy.

## 2017-07-26 ENCOUNTER — APPOINTMENT (OUTPATIENT)
Dept: PAIN MANAGEMENT | Facility: CLINIC | Age: 62
End: 2017-07-26

## 2017-07-26 DIAGNOSIS — Z46.81 ENCOUNTER FOR FITTING AND ADJUSTMENT OF INSULIN PUMP: ICD-10-CM

## 2017-07-26 DIAGNOSIS — E10.649 TYPE 1 DIABETES MELLITUS WITH HYPOGLYCEMIA WITHOUT COMA: ICD-10-CM

## 2017-07-26 LAB
ACETONE SERPL-MCNC: ABNORMAL
ALBUMIN SERPL ELPH-MCNC: 3.9 G/DL — SIGNIFICANT CHANGE UP (ref 3.3–5)
ALP SERPL-CCNC: 232 U/L — HIGH (ref 40–120)
ALT FLD-CCNC: 13 U/L RC — SIGNIFICANT CHANGE UP (ref 10–45)
ANION GAP SERPL CALC-SCNC: 15 MMOL/L — SIGNIFICANT CHANGE UP (ref 5–17)
ANION GAP SERPL CALC-SCNC: 19 MMOL/L — HIGH (ref 5–17)
ANION GAP SERPL CALC-SCNC: 19 MMOL/L — HIGH (ref 5–17)
APTT BLD: 41.6 SEC — HIGH (ref 27.5–37.4)
APTT BLD: 43.5 SEC — HIGH (ref 27.5–37.4)
APTT BLD: 50.3 SEC — HIGH (ref 27.5–37.4)
AST SERPL-CCNC: 26 U/L — SIGNIFICANT CHANGE UP (ref 10–40)
B-OH-BUTYR SERPL-SCNC: 1.8 MMOL/L — HIGH
BASOPHILS # BLD AUTO: 0 K/UL — SIGNIFICANT CHANGE UP (ref 0–0.2)
BASOPHILS NFR BLD AUTO: 0.3 % — SIGNIFICANT CHANGE UP (ref 0–2)
BILIRUB SERPL-MCNC: 0.2 MG/DL — SIGNIFICANT CHANGE UP (ref 0.2–1.2)
BUN SERPL-MCNC: 24 MG/DL — HIGH (ref 7–23)
BUN SERPL-MCNC: 27 MG/DL — HIGH (ref 7–23)
BUN SERPL-MCNC: 31 MG/DL — HIGH (ref 7–23)
CALCIUM SERPL-MCNC: 7.9 MG/DL — LOW (ref 8.4–10.5)
CALCIUM SERPL-MCNC: 7.9 MG/DL — LOW (ref 8.4–10.5)
CALCIUM SERPL-MCNC: 8.3 MG/DL — LOW (ref 8.4–10.5)
CHLORIDE SERPL-SCNC: 93 MMOL/L — LOW (ref 96–108)
CHLORIDE SERPL-SCNC: 95 MMOL/L — LOW (ref 96–108)
CHLORIDE SERPL-SCNC: 97 MMOL/L — SIGNIFICANT CHANGE UP (ref 96–108)
CK MB BLD-MCNC: 2.9 % — SIGNIFICANT CHANGE UP (ref 0–3.5)
CK MB CFR SERPL CALC: 5.2 NG/ML — HIGH (ref 0–3.8)
CK SERPL-CCNC: 180 U/L — HIGH (ref 25–170)
CO2 SERPL-SCNC: 19 MMOL/L — LOW (ref 22–31)
CO2 SERPL-SCNC: 23 MMOL/L — SIGNIFICANT CHANGE UP (ref 22–31)
CO2 SERPL-SCNC: 23 MMOL/L — SIGNIFICANT CHANGE UP (ref 22–31)
CREAT SERPL-MCNC: 5.75 MG/DL — HIGH (ref 0.5–1.3)
CREAT SERPL-MCNC: 5.95 MG/DL — HIGH (ref 0.5–1.3)
CREAT SERPL-MCNC: 6.42 MG/DL — HIGH (ref 0.5–1.3)
EOSINOPHIL # BLD AUTO: 0.1 K/UL — SIGNIFICANT CHANGE UP (ref 0–0.5)
EOSINOPHIL NFR BLD AUTO: 1.8 % — SIGNIFICANT CHANGE UP (ref 0–6)
GAS PNL BLDV: SIGNIFICANT CHANGE UP
GLUCOSE SERPL-MCNC: 213 MG/DL — HIGH (ref 70–99)
GLUCOSE SERPL-MCNC: 239 MG/DL — HIGH (ref 70–99)
GLUCOSE SERPL-MCNC: 242 MG/DL — HIGH (ref 70–99)
HCT VFR BLD CALC: 27.4 % — LOW (ref 34.5–45)
HGB BLD-MCNC: 9 G/DL — LOW (ref 11.5–15.5)
INR BLD: 0.98 RATIO — SIGNIFICANT CHANGE UP (ref 0.88–1.16)
LYMPHOCYTES # BLD AUTO: 0.6 K/UL — LOW (ref 1–3.3)
LYMPHOCYTES # BLD AUTO: 12.9 % — LOW (ref 13–44)
MAGNESIUM SERPL-MCNC: 2.2 MG/DL — SIGNIFICANT CHANGE UP (ref 1.6–2.6)
MCHC RBC-ENTMCNC: 33 GM/DL — SIGNIFICANT CHANGE UP (ref 32–36)
MCHC RBC-ENTMCNC: 34.9 PG — HIGH (ref 27–34)
MCV RBC AUTO: 106 FL — HIGH (ref 80–100)
MONOCYTES # BLD AUTO: 0.4 K/UL — SIGNIFICANT CHANGE UP (ref 0–0.9)
MONOCYTES NFR BLD AUTO: 8.8 % — SIGNIFICANT CHANGE UP (ref 2–14)
NEUTROPHILS # BLD AUTO: 3.6 K/UL — SIGNIFICANT CHANGE UP (ref 1.8–7.4)
NEUTROPHILS NFR BLD AUTO: 76.2 % — SIGNIFICANT CHANGE UP (ref 43–77)
PHOSPHATE SERPL-MCNC: 4.3 MG/DL — SIGNIFICANT CHANGE UP (ref 2.5–4.5)
PHOSPHATE SERPL-MCNC: 4.6 MG/DL — HIGH (ref 2.5–4.5)
PHOSPHATE SERPL-MCNC: 4.7 MG/DL — HIGH (ref 2.5–4.5)
PLATELET # BLD AUTO: 226 K/UL — SIGNIFICANT CHANGE UP (ref 150–400)
POTASSIUM SERPL-MCNC: 5.1 MMOL/L — SIGNIFICANT CHANGE UP (ref 3.5–5.3)
POTASSIUM SERPL-MCNC: 5.4 MMOL/L — HIGH (ref 3.5–5.3)
POTASSIUM SERPL-MCNC: 5.4 MMOL/L — HIGH (ref 3.5–5.3)
POTASSIUM SERPL-SCNC: 5.1 MMOL/L — SIGNIFICANT CHANGE UP (ref 3.5–5.3)
POTASSIUM SERPL-SCNC: 5.4 MMOL/L — HIGH (ref 3.5–5.3)
POTASSIUM SERPL-SCNC: 5.4 MMOL/L — HIGH (ref 3.5–5.3)
PROT SERPL-MCNC: 6.7 G/DL — SIGNIFICANT CHANGE UP (ref 6–8.3)
PROTHROM AB SERPL-ACNC: 10.7 SEC — SIGNIFICANT CHANGE UP (ref 9.8–12.7)
RBC # BLD: 2.59 M/UL — LOW (ref 3.8–5.2)
RBC # FLD: 12.6 % — SIGNIFICANT CHANGE UP (ref 10.3–14.5)
SODIUM SERPL-SCNC: 133 MMOL/L — LOW (ref 135–145)
SODIUM SERPL-SCNC: 135 MMOL/L — SIGNIFICANT CHANGE UP (ref 135–145)
SODIUM SERPL-SCNC: 135 MMOL/L — SIGNIFICANT CHANGE UP (ref 135–145)
TROPONIN T SERPL-MCNC: 1.38 NG/ML — HIGH (ref 0–0.06)
WBC # BLD: 4.7 K/UL — SIGNIFICANT CHANGE UP (ref 3.8–10.5)
WBC # FLD AUTO: 4.7 K/UL — SIGNIFICANT CHANGE UP (ref 3.8–10.5)

## 2017-07-26 PROCEDURE — 93010 ELECTROCARDIOGRAM REPORT: CPT

## 2017-07-26 PROCEDURE — 99291 CRITICAL CARE FIRST HOUR: CPT

## 2017-07-26 PROCEDURE — 99233 SBSQ HOSP IP/OBS HIGH 50: CPT

## 2017-07-26 RX ORDER — SODIUM CHLORIDE 9 MG/ML
1000 INJECTION, SOLUTION INTRAVENOUS
Qty: 0 | Refills: 0 | Status: DISCONTINUED | OUTPATIENT
Start: 2017-07-26 | End: 2017-07-27

## 2017-07-26 RX ORDER — INSULIN HUMAN 100 [IU]/ML
0.5 INJECTION, SOLUTION SUBCUTANEOUS
Qty: 100 | Refills: 0 | Status: DISCONTINUED | OUTPATIENT
Start: 2017-07-26 | End: 2017-07-27

## 2017-07-26 RX ORDER — GLUCAGON INJECTION, SOLUTION 0.5 MG/.1ML
1 INJECTION, SOLUTION SUBCUTANEOUS ONCE
Qty: 0 | Refills: 0 | Status: DISCONTINUED | OUTPATIENT
Start: 2017-07-26 | End: 2017-08-03

## 2017-07-26 RX ORDER — DEXTROSE 50 % IN WATER 50 %
1 SYRINGE (ML) INTRAVENOUS ONCE
Qty: 0 | Refills: 0 | Status: DISCONTINUED | OUTPATIENT
Start: 2017-07-26 | End: 2017-08-03

## 2017-07-26 RX ORDER — DEXTROSE 50 % IN WATER 50 %
25 SYRINGE (ML) INTRAVENOUS ONCE
Qty: 0 | Refills: 0 | Status: COMPLETED | OUTPATIENT
Start: 2017-07-26 | End: 2017-07-26

## 2017-07-26 RX ORDER — SODIUM CHLORIDE 9 MG/ML
1000 INJECTION, SOLUTION INTRAVENOUS
Qty: 0 | Refills: 0 | Status: DISCONTINUED | OUTPATIENT
Start: 2017-07-26 | End: 2017-07-26

## 2017-07-26 RX ORDER — SODIUM CHLORIDE 9 MG/ML
1000 INJECTION, SOLUTION INTRAVENOUS
Qty: 0 | Refills: 0 | Status: DISCONTINUED | OUTPATIENT
Start: 2017-07-26 | End: 2017-08-03

## 2017-07-26 RX ORDER — INSULIN LISPRO 100/ML
1 VIAL (ML) SUBCUTANEOUS
Qty: 0 | Refills: 0 | Status: DISCONTINUED | OUTPATIENT
Start: 2017-07-26 | End: 2017-07-26

## 2017-07-26 RX ORDER — ONDANSETRON 8 MG/1
4 TABLET, FILM COATED ORAL ONCE
Qty: 0 | Refills: 0 | Status: COMPLETED | OUTPATIENT
Start: 2017-07-26 | End: 2017-07-26

## 2017-07-26 RX ORDER — DEXTROSE 50 % IN WATER 50 %
50 SYRINGE (ML) INTRAVENOUS ONCE
Qty: 0 | Refills: 0 | Status: COMPLETED | OUTPATIENT
Start: 2017-07-26 | End: 2017-07-26

## 2017-07-26 RX ORDER — SODIUM CHLORIDE 9 MG/ML
1000 INJECTION INTRAMUSCULAR; INTRAVENOUS; SUBCUTANEOUS
Qty: 0 | Refills: 0 | Status: DISCONTINUED | OUTPATIENT
Start: 2017-07-26 | End: 2017-07-26

## 2017-07-26 RX ORDER — SEVELAMER CARBONATE 2400 MG/1
2400 POWDER, FOR SUSPENSION ORAL
Qty: 0 | Refills: 0 | Status: DISCONTINUED | OUTPATIENT
Start: 2017-07-26 | End: 2017-08-03

## 2017-07-26 RX ORDER — INSULIN HUMAN 100 [IU]/ML
1 INJECTION, SOLUTION SUBCUTANEOUS
Qty: 100 | Refills: 0 | Status: DISCONTINUED | OUTPATIENT
Start: 2017-07-26 | End: 2017-07-26

## 2017-07-26 RX ORDER — ERYTHROPOIETIN 10000 [IU]/ML
10000 INJECTION, SOLUTION INTRAVENOUS; SUBCUTANEOUS ONCE
Qty: 0 | Refills: 0 | Status: COMPLETED | OUTPATIENT
Start: 2017-07-26 | End: 2017-07-27

## 2017-07-26 RX ADMIN — CLOPIDOGREL BISULFATE 75 MILLIGRAM(S): 75 TABLET, FILM COATED ORAL at 11:17

## 2017-07-26 RX ADMIN — Medication 100 MILLIGRAM(S): at 06:04

## 2017-07-26 RX ADMIN — SODIUM CHLORIDE 50 MILLILITER(S): 9 INJECTION, SOLUTION INTRAVENOUS at 11:33

## 2017-07-26 RX ADMIN — DULOXETINE HYDROCHLORIDE 60 MILLIGRAM(S): 30 CAPSULE, DELAYED RELEASE ORAL at 11:17

## 2017-07-26 RX ADMIN — INSULIN HUMAN 0.5 UNIT(S)/HR: 100 INJECTION, SOLUTION SUBCUTANEOUS at 23:15

## 2017-07-26 RX ADMIN — HEPARIN SODIUM 1050 UNIT(S)/HR: 5000 INJECTION INTRAVENOUS; SUBCUTANEOUS at 08:07

## 2017-07-26 RX ADMIN — Medication 1 EACH: at 06:10

## 2017-07-26 RX ADMIN — Medication 81 MILLIGRAM(S): at 11:17

## 2017-07-26 RX ADMIN — CINACALCET 60 MILLIGRAM(S): 30 TABLET, FILM COATED ORAL at 11:17

## 2017-07-26 RX ADMIN — SODIUM CHLORIDE 75 MILLILITER(S): 9 INJECTION INTRAMUSCULAR; INTRAVENOUS; SUBCUTANEOUS at 20:00

## 2017-07-26 RX ADMIN — SENNA PLUS 2 TABLET(S): 8.6 TABLET ORAL at 22:19

## 2017-07-26 RX ADMIN — HEPARIN SODIUM 1250 UNIT(S)/HR: 5000 INJECTION INTRAVENOUS; SUBCUTANEOUS at 16:00

## 2017-07-26 RX ADMIN — Medication 100 MILLIGRAM(S): at 14:50

## 2017-07-26 RX ADMIN — Medication 50 MILLIGRAM(S): at 06:04

## 2017-07-26 RX ADMIN — ATORVASTATIN CALCIUM 20 MILLIGRAM(S): 80 TABLET, FILM COATED ORAL at 22:19

## 2017-07-26 RX ADMIN — ONDANSETRON 4 MILLIGRAM(S): 8 TABLET, FILM COATED ORAL at 12:50

## 2017-07-26 RX ADMIN — Medication 25 MILLILITER(S): at 11:27

## 2017-07-26 RX ADMIN — HEPARIN SODIUM 1150 UNIT(S)/HR: 5000 INJECTION INTRAVENOUS; SUBCUTANEOUS at 09:20

## 2017-07-26 RX ADMIN — Medication 100 MILLIGRAM(S): at 22:19

## 2017-07-26 RX ADMIN — INSULIN HUMAN 1 UNIT(S)/HR: 100 INJECTION, SOLUTION SUBCUTANEOUS at 19:50

## 2017-07-26 RX ADMIN — SEVELAMER CARBONATE 2400 MILLIGRAM(S): 2400 POWDER, FOR SUSPENSION ORAL at 08:09

## 2017-07-26 NOTE — PROVIDER CONTACT NOTE (OTHER) - ACTION/TREATMENT ORDERED:
Keep insulin pump disconnected.  Give Zofran IVP.  Hold HD today 7/26/17.  Continue IV fluids.  Continue to monitor FS at 1400.

## 2017-07-26 NOTE — PROVIDER CONTACT NOTE (OTHER) - RECOMMENDATIONS
Follow hypoglycemia protocol; administer 15 gm carbohydrates and recheck blood glucose in 15 minutes. Repeat until blood glucose over 100.

## 2017-07-26 NOTE — CHART NOTE - NSCHARTNOTEFT_GEN_A_CORE
CHIEF COMPLAINT: Type 1 Diabetes Mellitus with ketoacidosis    HPI:    62F with PMH T1DM on insulin pump with multiple complications including ESRD on HD (Tu/Th/Sat), retinopathy, neuropathy, hyperparathyroid, HTN, HLD, former smoker, gout, CAD s/p PCI on ASA and Plavix, dCHF, severe PVD s/p L&R fem-pop bypass graft, CVA, depression, chronic pain on oxycodone with multiple past admissions for DKA. Presented to ED from home on 7/23/17 with chief complaint of mid-sternal chest pain, found to have blood glucose >900. She was admitted to MICU on insulin gtt for further DKA management and r/o ACS.  In the MICU, cardiac enzymes initially positive but peaked and decreasing. IV heparin initiated as per ACS protocol.  Patient already on DAPT with ASA and Plavix. Endocrinology was consulted. Patient was transitioned off Insulin GTT yesterday and onto her insulin pump when her AG closed.  She was then transferred to a medical floor for further management per Endocrinology.  While on the floor, she experienced several episodes of hypoglycemia requiring juice and dextrose as she reported taking full dose insulin via pump, but not eating well due to nausea and vomiting.  The insulin pump was then discontinued and she was started on D5W for continued hypoglycemia as patient was refusing to eat.  An RRT was called for weakness/lethargy, however, at that time her glucose was > 200 and the dextrose decreased.  Lab work was sent showing the patient was going back into DKA with small acetate and a beta hydroxy butyrate level of 1.8, and an AG of 19.  Venous ph noted to be 7.35 with mild hyperkalemia of 5.4.  Patient accepted to MICU for intravenous insulin and management of DKA.        PAST MEDICAL & SURGICAL HISTORY:  Subclavian vein stenosis, left: s/p stent  Cellulitis: 2015 left foot  DKA, type 1: 1/2015  ACS (acute coronary syndrome): 1/2015 - cath revealed 100% ostial stenosis not amenable to PCI - medical management  MI, old  TIA (transient ischemic attack): x 2 - 8-9 years ago prior to ASD/VSD repair  CAD (coronary artery disease): s/p stents  Murmur, cardiac  Gout: past  CVA (cerebral infarction): with no residual, 8 yrs ago, prior to heart surgery - ST memory loss  Peripheral vascular disease: occluded left fem-pop bypass 5/2015  Diabetes mellitus type 1: Insulin Dependent - [employer]  Minimed Paradigm Insulin Pump - Novolog  ESRD (end stage renal disease): dialysis , tue, thursday, saturday  Hyperlipidemia  Status post device closure of ASD: &quot;clamshell&quot;  History of cardiac catheterization: 1/2015 - no intervention  S/P femoral-popliteal bypass surgery: L and R in 2013 with graft; 5/2015 CFA angioplasty left and ileofemoral endarterectomywith vein patch angioplasty of left fem-pop bypass graft  Multiple vascular surgery both leg, left fempop bypass revision 11/2015  AV (arteriovenous fistula): Left AV graft; revision with stent placement 2-3 years ago  S/P cholecystectomy      FAMILY HISTORY:  Family history of smoking  Family history of hypertension  Family history of cancer (Sibling)    Allergies: NKA      REVIEW OF SYSTEMS:  Constitutional: [ ] fevers [ ] chills [ ] weight loss [ ] weight gain  HEENT: [ ] dry eyes [ ] eye irritation [ ] postnasal drip [ ] nasal congestion  CV: [ ] chest pain [ ] orthopnea [ ] palpitations [ ] murmur  Resp: [ ] cough [ ] shortness of breath [ ] dyspnea [ ] wheezing [ ] sputum [ ] hemoptysis  GI: [x] nausea [x] vomiting [ ] diarrhea [ ] constipation [ ] abd pain [ ] dysphagia   : [ ] dysuria [ ] nocturia [ ] hematuria [ ] increased urinary frequency  Musculoskeletal: [ ] back pain [ ] myalgias [ ] arthralgias [ ] fracture  Skin: [ ] rash [ ] itch  Neurological: [ ] headache [ ] dizziness [ ] syncope [x] weakness [ ] numbness  Psychiatric: [ ] anxiety [ ] depression  Endocrine: [x] diabetes [ ] thyroid problem  Hematologic/Lymphatic: [ ] anemia [ ] bleeding problem  Allergic/Immunologic: [ ] itchy eyes [ ] nasal discharge [ ] hives [ ] angioedema  [x] All other systems negative  [ ] Unable to assess ROS because ________    OBJECTIVE:  ICU Vital Signs Last 24 Hrs  T(C): 36.8 (26 Jul 2017 17:12), Max: 36.8 (25 Jul 2017 20:00)  T(F): 98.2 (26 Jul 2017 17:12), Max: 98.3 (25 Jul 2017 20:00)  HR: 86 (26 Jul 2017 17:12) (70 - 86)  BP: 155/75 (26 Jul 2017 17:12) (127/68 - 171/83)  BP(mean): 109 (25 Jul 2017 20:00) (109 - 109)  ABP: 145/70 (25 Jul 2017 19:00) (145/70 - 173/58)  ABP(mean): 100 (25 Jul 2017 19:00) (100 - 107)  RR: 18 (26 Jul 2017 17:12) (14 - 23)  SpO2: 100% (26 Jul 2017 17:12) (97% - 100%)        07-25 @ 07:01 - 07-26 @ 07:00  --------------------------------------------------------  IN: 1052.5 mL / OUT: 10 mL / NET: 1042.5 mL    07-26 @ 07:01 - 07-26 @ 17:34  --------------------------------------------------------  IN: 240 mL / OUT: 0 mL / NET: 240 mL      CAPILLARY BLOOD GLUCOSE  296 (26 Jul 2017 16:03)      HOSPITAL MEDICATIONS:  MEDICATIONS  (STANDING):  DULoxetine 60 milliGRAM(s) Oral daily  atorvastatin 20 milliGRAM(s) Oral at bedtime  clopidogrel Tablet 75 milliGRAM(s) Oral daily  metoprolol succinate ER 50 milliGRAM(s) Oral daily  aspirin enteric coated 81 milliGRAM(s) Oral daily  cinacalcet 60 milliGRAM(s) Oral daily  hydrALAZINE 100 milliGRAM(s) Oral every 8 hours  dextrose 50% Injectable 50 milliLiter(s) IV Push every 15 minutes  dextrose 50% Injectable 25 milliLiter(s) IV Push every 15 minutes  senna 2 Tablet(s) Oral at bedtime  docusate sodium 100 milliGRAM(s) Oral three times a day  heparin  Infusion. 1050 Unit(s)/Hr (10.5 mL/Hr) IV Continuous <Continuous>  epoetin azalea Injectable 54179 Unit(s) IV Push once  dextrose 5%. 1000 milliLiter(s) (50 mL/Hr) IV Continuous <Continuous>  dextrose 5%. 1000 milliLiter(s) (50 mL/Hr) IV Continuous <Continuous>  insulin lispro (HumaLOG) Pump 1 Each SubCutaneous Continuous Pump  sevelamer hydrochloride 2400 milliGRAM(s) Oral three times a day with meals    MEDICATIONS  (PRN):  oxyCODONE    5 mG/acetaminophen 325 mG 1 Tablet(s) Oral every 6 hours PRN Severe Pain (7 - 10)  acetaminophen   Tablet. 650 milliGRAM(s) Oral every 6 hours PRN Mild and/or moderate Pain  ondansetron Injectable 4 milliGRAM(s) IV Push every 6 hours PRN Nausea and/or Vomiting  dextrose Gel 1 Dose(s) Oral once PRN Blood Glucose LESS THAN 70 milliGRAM(s)/deciLiter  glucagon  Injectable 1 milliGRAM(s) IntraMuscular once PRN Glucose <70 milliGRAM(s)/deciLiter      LABS:                        9.0    4.7   )-----------( 226      ( 26 Jul 2017 14:23 )             27.4     Hgb Trend: 9.0<--, 9.0<--, 8.3<--, 8.4<--, 9.9<--  07-26    135  |  93<L>  |  27<H>  ----------------------------<  239<H>  5.4<H>   |  23  |  5.95<H>    Ca    8.3<L>      26 Jul 2017 14:23  Phos  4.7     07-26  Mg     2.2     07-26    TPro  6.7  /  Alb  3.9  /  TBili  0.2  /  DBili  x   /  AST  26  /  ALT  13  /  AlkPhos  232<H>  07-26    Creatinine Trend: 5.95<--, 5.75<--, 4.73<--, 4.32<--, 3.88<--, 3.59<--  PT/INR - ( 26 Jul 2017 14:23 )   PT: 10.7 sec;   INR: 0.98 ratio         PTT - ( 26 Jul 2017 15:35 )  PTT:43.5 sec      Venous Blood Gas:  07-26 @ 13:55  7.35/48/45/26/77  VBG Lactate: 1.0      Plan:     1) Type 1 Diabetes Mellitus with ketoacidosis  -Hold insulin pump  -Initiate insulin gtt per DKA protocol, with careful titration to avoid hypoglycemia  -Maintain patient NPO  -Zofran for nausea  -Fingersticks q1h  -BMP, Mg, Phos, VBG, Acetone q4h  - IV hydration as tolerated  - f/u Endocrine as needed      2) NSTEMI  -Continue Heparin gtt  -Continue Plavix and Aspirin for DAPT  -Continue Metoprolol  -Continue Atorvastatin  -Telemetry monitoring  -Serial EKG monitoring  -Trend Cardiac Enzymes as noted to have rise in troponin (patient also with known ESRD), CK stable.    -Follow up Cardiology as needed    3) ESRD  -Careful IV hydration as tolerated  -HD per renal  -Monitor electrolytes; noted mild hyperkalemia  -follow up with Nephrology as needed    4) Hyperparathyroid  -Continue Sensipar  -Follow up Endocrine as needed    5) Chronic Pain  -Pain Assessment per routine  -Continue Percocet as tolerated    6) Hyperkalemia  -BMP [id number] closely monitor for hypokalemia in setting of initiation of insulin gtt  -HD per renal  -Telemetry monitoring  -EKG    7) Depression  -Continue Cymbalta    8) PPX  -Heparin gtt  -GI ppx not indicated CHIEF COMPLAINT: Type 1 Diabetes Mellitus with ketoacidosis    HPI:    62F with PMH T1DM on insulin pump with multiple complications including ESRD on HD (Tu/Th/Sat), retinopathy, neuropathy, hyperparathyroid, HTN, HLD, former smoker, gout, CAD s/p PCI on ASA and Plavix, dCHF, severe PVD s/p L&R fem-pop bypass graft, CVA, depression, chronic pain on oxycodone with multiple past admissions for DKA. Presented to ED from home on 7/23/17 with chief complaint of mid-sternal chest pain, found to have blood glucose >900. She was admitted to MICU on insulin gtt for further DKA management and r/o ACS.  In the MICU, cardiac enzymes initially positive but peaked and decreasing. IV heparin initiated as per ACS protocol.  Patient already on DAPT with ASA and Plavix. Endocrinology was consulted. Patient was transitioned off Insulin GTT yesterday and onto her insulin pump when her AG closed.  She was then transferred to a medical floor for further management per Endocrinology.  While on the floor, she experienced several episodes of hypoglycemia requiring juice and dextrose as she reported taking full dose insulin via pump, but not eating well due to nausea and vomiting.  The insulin pump was then discontinued and she was started on D5W for continued hypoglycemia as patient was refusing to eat.  An RRT was called for weakness/lethargy, however, at that time her glucose was > 200 and the dextrose decreased.  Lab work was sent showing the patient was going back into DKA with small acetate and a beta hydroxy butyrate level of 1.8, glucose of 239, and an AG of 19.  Venous ph noted to be 7.35 with mild hyperkalemia of 5.4.  Patient accepted to MICU for intravenous insulin and management of DKA.        PAST MEDICAL & SURGICAL HISTORY:  Subclavian vein stenosis, left: s/p stent  Cellulitis: 2015 left foot  DKA, type 1: 1/2015  ACS (acute coronary syndrome): 1/2015 - cath revealed 100% ostial stenosis not amenable to PCI - medical management  MI, old  TIA (transient ischemic attack): x 2 - 8-9 years ago prior to ASD/VSD repair  CAD (coronary artery disease): s/p stents  Murmur, cardiac  Gout: past  CVA (cerebral infarction): with no residual, 8 yrs ago, prior to heart surgery - ST memory loss  Peripheral vascular disease: occluded left fem-pop bypass 5/2015  Diabetes mellitus type 1: Insulin Dependent - [employer]  Minimed Paradigm Insulin Pump - Novolog  ESRD (end stage renal disease): dialysis , tue, thursday, saturday  Hyperlipidemia  Status post device closure of ASD: &quot;clamshell&quot;  History of cardiac catheterization: 1/2015 - no intervention  S/P femoral-popliteal bypass surgery: L and R in 2013 with graft; 5/2015 CFA angioplasty left and ileofemoral endarterectomywith vein patch angioplasty of left fem-pop bypass graft  Multiple vascular surgery both leg, left fempop bypass revision 11/2015  AV (arteriovenous fistula): Left AV graft; revision with stent placement 2-3 years ago  S/P cholecystectomy      FAMILY HISTORY:  Family history of smoking  Family history of hypertension  Family history of cancer (Sibling)    Allergies: NKA      REVIEW OF SYSTEMS:  Constitutional: [ ] fevers [ ] chills [ ] weight loss [ ] weight gain  HEENT: [ ] dry eyes [ ] eye irritation [ ] postnasal drip [ ] nasal congestion  CV: [ ] chest pain [ ] orthopnea [ ] palpitations [ ] murmur  Resp: [ ] cough [ ] shortness of breath [ ] dyspnea [ ] wheezing [ ] sputum [ ] hemoptysis  GI: [x] nausea [x] vomiting [ ] diarrhea [ ] constipation [ ] abd pain [ ] dysphagia   : [ ] dysuria [ ] nocturia [ ] hematuria [ ] increased urinary frequency  Musculoskeletal: [ ] back pain [ ] myalgias [ ] arthralgias [ ] fracture  Skin: [ ] rash [ ] itch  Neurological: [ ] headache [ ] dizziness [ ] syncope [x] weakness [ ] numbness  Psychiatric: [ ] anxiety [ ] depression  Endocrine: [x] diabetes [ ] thyroid problem  Hematologic/Lymphatic: [ ] anemia [ ] bleeding problem  Allergic/Immunologic: [ ] itchy eyes [ ] nasal discharge [ ] hives [ ] angioedema  [x] All other systems negative  [ ] Unable to assess ROS because ________    OBJECTIVE:  ICU Vital Signs Last 24 Hrs  T(C): 36.8 (26 Jul 2017 17:12), Max: 36.8 (25 Jul 2017 20:00)  T(F): 98.2 (26 Jul 2017 17:12), Max: 98.3 (25 Jul 2017 20:00)  HR: 86 (26 Jul 2017 17:12) (70 - 86)  BP: 155/75 (26 Jul 2017 17:12) (127/68 - 171/83)  BP(mean): 109 (25 Jul 2017 20:00) (109 - 109)  ABP: 145/70 (25 Jul 2017 19:00) (145/70 - 173/58)  ABP(mean): 100 (25 Jul 2017 19:00) (100 - 107)  RR: 18 (26 Jul 2017 17:12) (14 - 23)  SpO2: 100% (26 Jul 2017 17:12) (97% - 100%)        07-25 @ 07:01  -  07-26 @ 07:00  --------------------------------------------------------  IN: 1052.5 mL / OUT: 10 mL / NET: 1042.5 mL    07-26 @ 07:01 - 07-26 @ 17:34  --------------------------------------------------------  IN: 240 mL / OUT: 0 mL / NET: 240 mL      CAPILLARY BLOOD GLUCOSE  296 (26 Jul 2017 16:03)      HOSPITAL MEDICATIONS:  MEDICATIONS  (STANDING):  DULoxetine 60 milliGRAM(s) Oral daily  atorvastatin 20 milliGRAM(s) Oral at bedtime  clopidogrel Tablet 75 milliGRAM(s) Oral daily  metoprolol succinate ER 50 milliGRAM(s) Oral daily  aspirin enteric coated 81 milliGRAM(s) Oral daily  cinacalcet 60 milliGRAM(s) Oral daily  hydrALAZINE 100 milliGRAM(s) Oral every 8 hours  dextrose 50% Injectable 50 milliLiter(s) IV Push every 15 minutes  dextrose 50% Injectable 25 milliLiter(s) IV Push every 15 minutes  senna 2 Tablet(s) Oral at bedtime  docusate sodium 100 milliGRAM(s) Oral three times a day  heparin  Infusion. 1050 Unit(s)/Hr (10.5 mL/Hr) IV Continuous <Continuous>  epoetin azalea Injectable 84318 Unit(s) IV Push once  dextrose 5%. 1000 milliLiter(s) (50 mL/Hr) IV Continuous <Continuous>  dextrose 5%. 1000 milliLiter(s) (50 mL/Hr) IV Continuous <Continuous>  insulin lispro (HumaLOG) Pump 1 Each SubCutaneous Continuous Pump  sevelamer hydrochloride 2400 milliGRAM(s) Oral three times a day with meals    MEDICATIONS  (PRN):  oxyCODONE    5 mG/acetaminophen 325 mG 1 Tablet(s) Oral every 6 hours PRN Severe Pain (7 - 10)  acetaminophen   Tablet. 650 milliGRAM(s) Oral every 6 hours PRN Mild and/or moderate Pain  ondansetron Injectable 4 milliGRAM(s) IV Push every 6 hours PRN Nausea and/or Vomiting  dextrose Gel 1 Dose(s) Oral once PRN Blood Glucose LESS THAN 70 milliGRAM(s)/deciLiter  glucagon  Injectable 1 milliGRAM(s) IntraMuscular once PRN Glucose <70 milliGRAM(s)/deciLiter      LABS:                        9.0    4.7   )-----------( 226      ( 26 Jul 2017 14:23 )             27.4     Hgb Trend: 9.0<--, 9.0<--, 8.3<--, 8.4<--, 9.9<--  07-26    135  |  93<L>  |  27<H>  ----------------------------<  239<H>  5.4<H>   |  23  |  5.95<H>    Ca    8.3<L>      26 Jul 2017 14:23  Phos  4.7     07-26  Mg     2.2     07-26    TPro  6.7  /  Alb  3.9  /  TBili  0.2  /  DBili  x   /  AST  26  /  ALT  13  /  AlkPhos  232<H>  07-26    Creatinine Trend: 5.95<--, 5.75<--, 4.73<--, 4.32<--, 3.88<--, 3.59<--  PT/INR - ( 26 Jul 2017 14:23 )   PT: 10.7 sec;   INR: 0.98 ratio         PTT - ( 26 Jul 2017 15:35 )  PTT:43.5 sec      Venous Blood Gas:  07-26 @ 13:55  7.35/48/45/26/77  VBG Lactate: 1.0      Plan:     1) Type 1 Diabetes Mellitus with ketoacidosis  -Hold insulin pump  -Initiate insulin gtt per DKA protocol, with careful titration to avoid hypoglycemia  -Maintain patient NPO  -Zofran for nausea  -Fingersticks q1h  -BMP, Mg, Phos, VBG, Acetone q4h  - IV hydration as tolerated  - f/u Endocrine as needed      2) NSTEMI  -Continue Heparin gtt  -Continue Plavix and Aspirin for DAPT  -Continue Metoprolol  -Continue Atorvastatin  -Telemetry monitoring  -Serial EKG monitoring  -Trend Cardiac Enzymes as noted to have rise in troponin (patient also with known ESRD), CK stable.    -Follow up Cardiology as needed    3) ESRD  -Careful IV hydration as tolerated  -HD per renal  -Monitor electrolytes; noted mild hyperkalemia  -follow up with Nephrology as needed    4) Hyperparathyroid  -Continue Sensipar  -Follow up Endocrine as needed    5) Chronic Pain  -Pain Assessment per routine  -Continue Percocet as tolerated    6) Hyperkalemia  -BMP [id number] closely monitor for hypokalemia in setting of initiation of insulin gtt  -HD per renal  -Telemetry monitoring  -EKG    7) Depression  -Continue Cymbalta    8) PPX  -Heparin gtt  -GI ppx not indicated CHIEF COMPLAINT: Type 1 Diabetes Mellitus with ketoacidosis    HPI:    62F with PMH T1DM on insulin pump with multiple complications including ESRD on HD (Tu/Th/Sat), retinopathy, neuropathy, hyperparathyroid, HTN, HLD, former smoker, gout, CAD s/p PCI on ASA and Plavix, dCHF, severe PVD s/p L&R fem-pop bypass graft, CVA, depression, chronic pain on oxycodone with multiple past admissions for DKA. Presented to ED from home on 7/23/17 with chief complaint of mid-sternal chest pain, found to have blood glucose >900. She was admitted to MICU on insulin gtt for further DKA management and r/o ACS.  In the MICU, cardiac enzymes initially positive but peaked and decreasing. IV heparin initiated as per ACS protocol.  Patient already on DAPT with ASA and Plavix. Endocrinology was consulted. Patient was transitioned off Insulin GTT yesterday and onto her insulin pump when her AG closed.  She was then transferred to a medical floor for further management per Endocrinology.  While on the floor, she experienced several episodes of hypoglycemia requiring juice and dextrose as she reported taking full dose insulin via pump, but not eating well due to nausea and vomiting.  The insulin pump was then discontinued and she was started on D5W for continued hypoglycemia as patient was refusing to eat.  An RRT was called for weakness/lethargy, however, at that time her glucose was > 200 and the dextrose decreased.  Lab work was sent showing the patient was going back into DKA with small acetate and a beta hydroxy butyrate level of 1.8, glucose of 239, and an AG of 19.  Venous ph noted to be 7.35 with mild hyperkalemia of 5.4.  Patient accepted to MICU for intravenous insulin and management of DKA.        PAST MEDICAL & SURGICAL HISTORY:  Subclavian vein stenosis, left: s/p stent  Cellulitis: 2015 left foot  DKA, type 1: 1/2015  ACS (acute coronary syndrome): 1/2015 - cath revealed 100% ostial stenosis not amenable to PCI - medical management  MI, old  TIA (transient ischemic attack): x 2 - 8-9 years ago prior to ASD/VSD repair  CAD (coronary artery disease): s/p stents  Murmur, cardiac  Gout: past  CVA (cerebral infarction): with no residual, 8 yrs ago, prior to heart surgery - ST memory loss  Peripheral vascular disease: occluded left fem-pop bypass 5/2015  Diabetes mellitus type 1: Insulin Dependent - [employer]  Minimed Paradigm Insulin Pump - Novolog  ESRD (end stage renal disease): dialysis , tue, thursday, saturday  Hyperlipidemia  Status post device closure of ASD: &quot;clamshell&quot;  History of cardiac catheterization: 1/2015 - no intervention  S/P femoral-popliteal bypass surgery: L and R in 2013 with graft; 5/2015 CFA angioplasty left and ileofemoral endarterectomywith vein patch angioplasty of left fem-pop bypass graft  Multiple vascular surgery both leg, left fempop bypass revision 11/2015  AV (arteriovenous fistula): Left AV graft; revision with stent placement 2-3 years ago  S/P cholecystectomy      FAMILY HISTORY:  Family history of smoking  Family history of hypertension  Family history of cancer (Sibling)    Allergies: NKA      REVIEW OF SYSTEMS:  Constitutional: [ ] fevers [ ] chills [ ] weight loss [ ] weight gain  HEENT: [ ] dry eyes [ ] eye irritation [ ] postnasal drip [ ] nasal congestion  CV: [ ] chest pain [ ] orthopnea [ ] palpitations [ ] murmur  Resp: [ ] cough [ ] shortness of breath [ ] dyspnea [ ] wheezing [ ] sputum [ ] hemoptysis  GI: [x] nausea [x] vomiting [ ] diarrhea [ ] constipation [ ] abd pain [ ] dysphagia   : [ ] dysuria [ ] nocturia [ ] hematuria [ ] increased urinary frequency  Musculoskeletal: [ ] back pain [ ] myalgias [ ] arthralgias [ ] fracture  Skin: [ ] rash [ ] itch  Neurological: [ ] headache [ ] dizziness [ ] syncope [x] weakness [ ] numbness  Psychiatric: [ ] anxiety [ ] depression  Endocrine: [x] diabetes [ ] thyroid problem  Hematologic/Lymphatic: [ ] anemia [ ] bleeding problem  Allergic/Immunologic: [ ] itchy eyes [ ] nasal discharge [ ] hives [ ] angioedema  [x] All other systems negative  [ ] Unable to assess ROS because ________    OBJECTIVE:  ICU Vital Signs Last 24 Hrs  T(C): 36.8 (26 Jul 2017 17:12), Max: 36.8 (25 Jul 2017 20:00)  T(F): 98.2 (26 Jul 2017 17:12), Max: 98.3 (25 Jul 2017 20:00)  HR: 86 (26 Jul 2017 17:12) (70 - 86)  BP: 155/75 (26 Jul 2017 17:12) (127/68 - 171/83)  BP(mean): 109 (25 Jul 2017 20:00) (109 - 109)  ABP: 145/70 (25 Jul 2017 19:00) (145/70 - 173/58)  ABP(mean): 100 (25 Jul 2017 19:00) (100 - 107)  RR: 18 (26 Jul 2017 17:12) (14 - 23)  SpO2: 100% (26 Jul 2017 17:12) (97% - 100%)        07-25 @ 07:01  -  07-26 @ 07:00  --------------------------------------------------------  IN: 1052.5 mL / OUT: 10 mL / NET: 1042.5 mL    07-26 @ 07:01 - 07-26 @ 17:34  --------------------------------------------------------  IN: 240 mL / OUT: 0 mL / NET: 240 mL      CAPILLARY BLOOD GLUCOSE  296 (26 Jul 2017 16:03)      HOSPITAL MEDICATIONS:  MEDICATIONS  (STANDING):  DULoxetine 60 milliGRAM(s) Oral daily  atorvastatin 20 milliGRAM(s) Oral at bedtime  clopidogrel Tablet 75 milliGRAM(s) Oral daily  metoprolol succinate ER 50 milliGRAM(s) Oral daily  aspirin enteric coated 81 milliGRAM(s) Oral daily  cinacalcet 60 milliGRAM(s) Oral daily  hydrALAZINE 100 milliGRAM(s) Oral every 8 hours  dextrose 50% Injectable 50 milliLiter(s) IV Push every 15 minutes  dextrose 50% Injectable 25 milliLiter(s) IV Push every 15 minutes  senna 2 Tablet(s) Oral at bedtime  docusate sodium 100 milliGRAM(s) Oral three times a day  heparin  Infusion. 1050 Unit(s)/Hr (10.5 mL/Hr) IV Continuous <Continuous>  epoetin azalea Injectable 16575 Unit(s) IV Push once  dextrose 5%. 1000 milliLiter(s) (50 mL/Hr) IV Continuous <Continuous>  dextrose 5%. 1000 milliLiter(s) (50 mL/Hr) IV Continuous <Continuous>  insulin lispro (HumaLOG) Pump 1 Each SubCutaneous Continuous Pump  sevelamer hydrochloride 2400 milliGRAM(s) Oral three times a day with meals    MEDICATIONS  (PRN):  oxyCODONE    5 mG/acetaminophen 325 mG 1 Tablet(s) Oral every 6 hours PRN Severe Pain (7 - 10)  acetaminophen   Tablet. 650 milliGRAM(s) Oral every 6 hours PRN Mild and/or moderate Pain  ondansetron Injectable 4 milliGRAM(s) IV Push every 6 hours PRN Nausea and/or Vomiting  dextrose Gel 1 Dose(s) Oral once PRN Blood Glucose LESS THAN 70 milliGRAM(s)/deciLiter  glucagon  Injectable 1 milliGRAM(s) IntraMuscular once PRN Glucose <70 milliGRAM(s)/deciLiter      LABS:                        9.0    4.7   )-----------( 226      ( 26 Jul 2017 14:23 )             27.4     Hgb Trend: 9.0<--, 9.0<--, 8.3<--, 8.4<--, 9.9<--  07-26    135  |  93<L>  |  27<H>  ----------------------------<  239<H>  5.4<H>   |  23  |  5.95<H>    Ca    8.3<L>      26 Jul 2017 14:23  Phos  4.7     07-26  Mg     2.2     07-26    TPro  6.7  /  Alb  3.9  /  TBili  0.2  /  DBili  x   /  AST  26  /  ALT  13  /  AlkPhos  232<H>  07-26    Creatinine Trend: 5.95<--, 5.75<--, 4.73<--, 4.32<--, 3.88<--, 3.59<--  PT/INR - ( 26 Jul 2017 14:23 )   PT: 10.7 sec;   INR: 0.98 ratio         PTT - ( 26 Jul 2017 15:35 )  PTT:43.5 sec      Venous Blood Gas:  07-26 @ 13:55  7.35/48/45/26/77  VBG Lactate: 1.0      Plan:     1) Type 1 Diabetes Mellitus with ketoacidosis  -Hold insulin pump  -Initiate insulin gtt per DKA protocol, with careful titration to avoid hypoglycemia  -Maintain patient NPO  -Zofran for nausea  -Fingersticks q1h  -BMP, Mg, Phos, VBG, Acetone q4h  - IV hydration as tolerated  - f/u Endocrine as needed      2) NSTEMI  -Discontinue Heparin gtt as patient completed duration of treatment per ACS protocol  -Continue Plavix and Aspirin for DAPT  -Continue Metoprolol  -Continue Atorvastatin  -Telemetry monitoring  -Serial EKG monitoring  -Trend Cardiac Enzymes as noted to have rise in troponin (patient also with known ESRD), CK stable.    -Follow up Cardiology as needed    3) ESRD  -Careful IV hydration as tolerated  -HD per renal  -Monitor electrolytes; noted mild hyperkalemia  -follow up with Nephrology as needed    4) Hyperparathyroid  -Continue Sensipar  -Follow up Endocrine as needed    5) Chronic Pain  -Pain Assessment per routine  -Continue Percocet as tolerated    6) Hyperkalemia  -BMP [id number] closely monitor for hypokalemia in setting of initiation of insulin gtt  -HD per renal  -Telemetry monitoring  -EKG    7) Depression  -Continue Cymbalta    8) PPX  -Heparin gtt  -GI ppx not indicated CHIEF COMPLAINT: Type 1 Diabetes Mellitus with ketoacidosis    HPI:    62F with PMH T1DM on insulin pump with multiple complications including ESRD on HD (Tu/Th/Sat), retinopathy, neuropathy, hyperparathyroid, HTN, HLD, former smoker, gout, CAD s/p PCI on ASA and Plavix, dCHF, severe PVD s/p L&R fem-pop bypass graft, CVA, depression, chronic pain on oxycodone with multiple past admissions for DKA. Presented to ED from home on 7/23/17 with chief complaint of mid-sternal chest pain, found to have blood glucose >900. She was admitted to MICU on insulin gtt for further DKA management and r/o ACS.  In the MICU, cardiac enzymes initially positive but peaked and decreasing. IV heparin initiated as per ACS protocol.  Patient already on DAPT with ASA and Plavix. Endocrinology was consulted. Patient was transitioned off Insulin GTT yesterday and onto her insulin pump when her AG closed.  She was then transferred to a medical floor for further management per Endocrinology.  While on the floor, she experienced several episodes of hypoglycemia requiring juice and dextrose as she reported taking full dose insulin via pump, but not eating well due to nausea and vomiting.  The insulin pump was then discontinued and she was started on D5W for continued hypoglycemia as patient was refusing to eat.  An RRT was called for weakness/lethargy, however, at that time her glucose was > 200 and the dextrose decreased.  Lab work was sent showing the patient was going back into DKA with small acetate and a beta hydroxy butyrate level of 1.8, glucose of 239, and an AG of 19.  Venous ph noted to be 7.35 with mild hyperkalemia of 5.4.  Patient accepted to MICU for intravenous insulin and management of DKA.        PAST MEDICAL & SURGICAL HISTORY:  Subclavian vein stenosis, left: s/p stent  Cellulitis: 2015 left foot  DKA, type 1: 1/2015  ACS (acute coronary syndrome): 1/2015 - cath revealed 100% ostial stenosis not amenable to PCI - medical management  MI, old  TIA (transient ischemic attack): x 2 - 8-9 years ago prior to ASD/VSD repair  CAD (coronary artery disease): s/p stents  Murmur, cardiac  Gout: past  CVA (cerebral infarction): with no residual, 8 yrs ago, prior to heart surgery - ST memory loss  Peripheral vascular disease: occluded left fem-pop bypass 5/2015  Diabetes mellitus type 1: Insulin Dependent - [employer]  Minimed Paradigm Insulin Pump - Novolog  ESRD (end stage renal disease): dialysis , tue, thursday, saturday  Hyperlipidemia  Status post device closure of ASD: &quot;clamshell&quot;  History of cardiac catheterization: 1/2015 - no intervention  S/P femoral-popliteal bypass surgery: L and R in 2013 with graft; 5/2015 CFA angioplasty left and ileofemoral endarterectomywith vein patch angioplasty of left fem-pop bypass graft  Multiple vascular surgery both leg, left fempop bypass revision 11/2015  AV (arteriovenous fistula): Left AV graft; revision with stent placement 2-3 years ago  S/P cholecystectomy      FAMILY HISTORY:  Family history of smoking  Family history of hypertension  Family history of cancer (Sibling)    Allergies: NKA      REVIEW OF SYSTEMS:  Constitutional: [ ] fevers [ ] chills [ ] weight loss [ ] weight gain  HEENT: [ ] dry eyes [ ] eye irritation [ ] postnasal drip [ ] nasal congestion  CV: [ ] chest pain [ ] orthopnea [ ] palpitations [ ] murmur  Resp: [ ] cough [ ] shortness of breath [ ] dyspnea [ ] wheezing [ ] sputum [ ] hemoptysis  GI: [x] nausea [x] vomiting [ ] diarrhea [ ] constipation [ ] abd pain [ ] dysphagia   : [ ] dysuria [ ] nocturia [ ] hematuria [ ] increased urinary frequency  Musculoskeletal: [ ] back pain [ ] myalgias [ ] arthralgias [ ] fracture  Skin: [ ] rash [ ] itch  Neurological: [ ] headache [ ] dizziness [ ] syncope [x] weakness [ ] numbness  Psychiatric: [ ] anxiety [ ] depression  Endocrine: [x] diabetes [ ] thyroid problem  Hematologic/Lymphatic: [ ] anemia [ ] bleeding problem  Allergic/Immunologic: [ ] itchy eyes [ ] nasal discharge [ ] hives [ ] angioedema  [x] All other systems negative  [ ] Unable to assess ROS because ________    OBJECTIVE:  ICU Vital Signs Last 24 Hrs  T(C): 36.8 (26 Jul 2017 17:12), Max: 36.8 (25 Jul 2017 20:00)  T(F): 98.2 (26 Jul 2017 17:12), Max: 98.3 (25 Jul 2017 20:00)  HR: 86 (26 Jul 2017 17:12) (70 - 86)  BP: 155/75 (26 Jul 2017 17:12) (127/68 - 171/83)  BP(mean): 109 (25 Jul 2017 20:00) (109 - 109)  ABP: 145/70 (25 Jul 2017 19:00) (145/70 - 173/58)  ABP(mean): 100 (25 Jul 2017 19:00) (100 - 107)  RR: 18 (26 Jul 2017 17:12) (14 - 23)  SpO2: 100% (26 Jul 2017 17:12) (97% - 100%)    Physical Exam:  Neuro: Alert & oriented x 4.  Pupils 3mm bilateral brisk  Pulm: CTA bilaterally. Unlabored, no respiratory distress  CV: S1, S2. No murmur, gallop, thrill appreciated  GI: Soft, nontender, positive bowel sounds in all four quadrants  Peripheral Vascular: +2 radial pulses bilaterally, +1 pedal pulses bilaterally. AVF left upper extremity      07-25 @ 07:01 - 07-26 @ 07:00  --------------------------------------------------------  IN: 1052.5 mL / OUT: 10 mL / NET: 1042.5 mL    07-26 @ 07:01 - 07-26 @ 17:34  --------------------------------------------------------  IN: 240 mL / OUT: 0 mL / NET: 240 mL      CAPILLARY BLOOD GLUCOSE  296 (26 Jul 2017 16:03)      HOSPITAL MEDICATIONS:  MEDICATIONS  (STANDING):  DULoxetine 60 milliGRAM(s) Oral daily  atorvastatin 20 milliGRAM(s) Oral at bedtime  clopidogrel Tablet 75 milliGRAM(s) Oral daily  metoprolol succinate ER 50 milliGRAM(s) Oral daily  aspirin enteric coated 81 milliGRAM(s) Oral daily  cinacalcet 60 milliGRAM(s) Oral daily  hydrALAZINE 100 milliGRAM(s) Oral every 8 hours  dextrose 50% Injectable 50 milliLiter(s) IV Push every 15 minutes  dextrose 50% Injectable 25 milliLiter(s) IV Push every 15 minutes  senna 2 Tablet(s) Oral at bedtime  docusate sodium 100 milliGRAM(s) Oral three times a day  heparin  Infusion. 1050 Unit(s)/Hr (10.5 mL/Hr) IV Continuous <Continuous>  epoetin azalea Injectable 83875 Unit(s) IV Push once  dextrose 5%. 1000 milliLiter(s) (50 mL/Hr) IV Continuous <Continuous>  dextrose 5%. 1000 milliLiter(s) (50 mL/Hr) IV Continuous <Continuous>  insulin lispro (HumaLOG) Pump 1 Each SubCutaneous Continuous Pump  sevelamer hydrochloride 2400 milliGRAM(s) Oral three times a day with meals    MEDICATIONS  (PRN):  oxyCODONE    5 mG/acetaminophen 325 mG 1 Tablet(s) Oral every 6 hours PRN Severe Pain (7 - 10)  acetaminophen   Tablet. 650 milliGRAM(s) Oral every 6 hours PRN Mild and/or moderate Pain  ondansetron Injectable 4 milliGRAM(s) IV Push every 6 hours PRN Nausea and/or Vomiting  dextrose Gel 1 Dose(s) Oral once PRN Blood Glucose LESS THAN 70 milliGRAM(s)/deciLiter  glucagon  Injectable 1 milliGRAM(s) IntraMuscular once PRN Glucose <70 milliGRAM(s)/deciLiter      LABS:                        9.0    4.7   )-----------( 226      ( 26 Jul 2017 14:23 )             27.4     Hgb Trend: 9.0<--, 9.0<--, 8.3<--, 8.4<--, 9.9<--  07-26    135  |  93<L>  |  27<H>  ----------------------------<  239<H>  5.4<H>   |  23  |  5.95<H>    Ca    8.3<L>      26 Jul 2017 14:23  Phos  4.7     07-26  Mg     2.2     07-26    TPro  6.7  /  Alb  3.9  /  TBili  0.2  /  DBili  x   /  AST  26  /  ALT  13  /  AlkPhos  232<H>  07-26    Creatinine Trend: 5.95<--, 5.75<--, 4.73<--, 4.32<--, 3.88<--, 3.59<--  PT/INR - ( 26 Jul 2017 14:23 )   PT: 10.7 sec;   INR: 0.98 ratio         PTT - ( 26 Jul 2017 15:35 )  PTT:43.5 sec      Venous Blood Gas:  07-26 @ 13:55  7.35/48/45/26/77  VBG Lactate: 1.0      Plan:     1) Type 1 Diabetes Mellitus with ketoacidosis  -Hold insulin pump  -Initiate insulin gtt per DKA protocol, with careful titration to avoid hypoglycemia  -Maintain patient NPO  -Zofran for nausea  -Fingersticks q1h  -BMP, Mg, Phos, VBG, Acetone q4h  - IV hydration as tolerated  - f/u Endocrine as needed      2) NSTEMI  -Discontinue Heparin gtt as patient completed duration of treatment per ACS protocol  -Continue Plavix and Aspirin for DAPT  -Continue Metoprolol  -Continue Atorvastatin  -Telemetry monitoring  -Serial EKG monitoring  -Trend Cardiac Enzymes as noted to have rise in troponin (patient also with known ESRD), CK stable.    -Follow up Cardiology as needed    3) ESRD  -Careful IV hydration as tolerated  -HD per renal  -Monitor electrolytes; noted mild hyperkalemia  -follow up with Nephrology as needed    4) Hyperparathyroid  -Continue Sensipar  -Follow up Endocrine as needed    5) Chronic Pain  -Pain Assessment per routine  -Continue Percocet as tolerated    6) Hyperkalemia  -BMP [id number] closely monitor for hypokalemia in setting of initiation of insulin gtt  -HD per renal  -Telemetry monitoring  -EKG    7) Depression  -Continue Cymbalta    8) PPX  -Heparin gtt  -GI ppx not indicated CHIEF COMPLAINT: Type 1 Diabetes Mellitus with ketoacidosis    HPI:    62F with PMH T1DM on insulin pump with multiple complications including ESRD on HD (Tu/Th/Sat), retinopathy, neuropathy, hyperparathyroid, HTN, HLD, former smoker, gout, CAD s/p PCI on ASA and Plavix, dCHF, severe PVD s/p L&R fem-pop bypass graft, CVA, depression, chronic pain on oxycodone with multiple past admissions for DKA. Presented to ED from home on 7/23/17 with chief complaint of mid-sternal chest pain, found to have blood glucose >900. She was admitted to MICU on insulin gtt for further DKA management and r/o ACS.  In the MICU, cardiac enzymes initially positive but peaked and decreasing. IV heparin initiated as per ACS protocol.  Patient already on DAPT with ASA and Plavix. Endocrinology was consulted. Patient was transitioned off Insulin GTT yesterday and onto her insulin pump when her AG closed.  She was then transferred to a medical floor for further management per Endocrinology.  While on the floor, she experienced several episodes of hypoglycemia requiring juice and dextrose as she reported taking full dose insulin via pump, but not eating well due to nausea and vomiting.  The insulin pump was then discontinued and she was started on D5W for continued hypoglycemia as patient was refusing to eat.  An RRT was called for weakness/lethargy, however, at that time her glucose was > 200 and the dextrose decreased.  Lab work was sent showing the patient was going back into DKA with small acetate and a beta hydroxy butyrate level of 1.8, glucose of 239, and an AG of 19.  Venous ph noted to be 7.35 with mild hyperkalemia of 5.4.  Patient accepted to MICU for intravenous insulin and management of DKA.        PAST MEDICAL & SURGICAL HISTORY:  Subclavian vein stenosis, left: s/p stent  Cellulitis: 2015 left foot  DKA, type 1: 1/2015  ACS (acute coronary syndrome): 1/2015 - cath revealed 100% ostial stenosis not amenable to PCI - medical management  MI, old  TIA (transient ischemic attack): x 2 - 8-9 years ago prior to ASD/VSD repair  CAD (coronary artery disease): s/p stents  Murmur, cardiac  Gout: past  CVA (cerebral infarction): with no residual, 8 yrs ago, prior to heart surgery - ST memory loss  Peripheral vascular disease: occluded left fem-pop bypass 5/2015  Diabetes mellitus type 1: Insulin Dependent - [employer]  Minimed Paradigm Insulin Pump - Novolog  ESRD (end stage renal disease): dialysis , tue, thursday, saturday  Hyperlipidemia  Status post device closure of ASD: &quot; clamshell&quot;  History of cardiac catheterization: 1/2015 - no intervention  S/P femoral-popliteal bypass surgery: L and R in 2013 with graft; 5/2015 CFA angioplasty left and ileofemoral endarterectomywith vein patch angioplasty of left fem-pop bypass graft  Multiple vascular surgery both leg, left fempop bypass revision 11/2015  AV (arteriovenous fistula): Left AV graft; revision with stent placement 2-3 years ago  S/P cholecystectomy      FAMILY HISTORY:  Family history of smoking  Family history of hypertension  Family history of cancer (Sibling)    Allergies: NKA      REVIEW OF SYSTEMS:  Constitutional: [ ] fevers [ ] chills [ ] weight loss [ ] weight gain  HEENT: [ ] dry eyes [ ] eye irritation [ ] postnasal drip [ ] nasal congestion  CV: [ ] chest pain [ ] orthopnea [ ] palpitations [ ] murmur  Resp: [ ] cough [ ] shortness of breath [ ] dyspnea [ ] wheezing [ ] sputum [ ] hemoptysis  GI: [x] nausea [x] vomiting [ ] diarrhea [ ] constipation [ ] abd pain [ ] dysphagia   : [ ] dysuria [ ] nocturia [ ] hematuria [ ] increased urinary frequency  Musculoskeletal: [ ] back pain [ ] myalgias [ ] arthralgias [ ] fracture  Skin: [ ] rash [ ] itch  Neurological: [ ] headache [ ] dizziness [ ] syncope [x] weakness [ ] numbness  Psychiatric: [ ] anxiety [ ] depression  Endocrine: [x] diabetes [ ] thyroid problem  Hematologic/Lymphatic: [ ] anemia [ ] bleeding problem  Allergic/Immunologic: [ ] itchy eyes [ ] nasal discharge [ ] hives [ ] angioedema  [x] All other systems negative  [ ] Unable to assess ROS because ________    OBJECTIVE:  ICU Vital Signs Last 24 Hrs  T(C): 36.8 (26 Jul 2017 17:12), Max: 36.8 (25 Jul 2017 20:00)  T(F): 98.2 (26 Jul 2017 17:12), Max: 98.3 (25 Jul 2017 20:00)  HR: 86 (26 Jul 2017 17:12) (70 - 86)  BP: 155/75 (26 Jul 2017 17:12) (127/68 - 171/83)  BP(mean): 109 (25 Jul 2017 20:00) (109 - 109)  ABP: 145/70 (25 Jul 2017 19:00) (145/70 - 173/58)  ABP(mean): 100 (25 Jul 2017 19:00) (100 - 107)  RR: 18 (26 Jul 2017 17:12) (14 - 23)  SpO2: 100% (26 Jul 2017 17:12) (97% - 100%)    Physical Exam:  Neuro: Alert & oriented x 4.  Pupils 3mm bilateral brisk  Pulm: CTA bilaterally. Unlabored, no respiratory distress  CV: S1, S2. No murmur, gallop, thrill appreciated  GI: Soft, nontender, positive bowel sounds in all four quadrants  Peripheral Vascular: +2 radial pulses bilaterally, +1 pedal pulses bilaterally. AVF left upper extremity      07-25 @ 07:01 - 07-26 @ 07:00  --------------------------------------------------------  IN: 1052.5 mL / OUT: 10 mL / NET: 1042.5 mL    07-26 @ 07:01 - 07-26 @ 17:34  --------------------------------------------------------  IN: 240 mL / OUT: 0 mL / NET: 240 mL      CAPILLARY BLOOD GLUCOSE  296 (26 Jul 2017 16:03)      HOSPITAL MEDICATIONS:  MEDICATIONS  (STANDING):  DULoxetine 60 milliGRAM(s) Oral daily  atorvastatin 20 milliGRAM(s) Oral at bedtime  clopidogrel Tablet 75 milliGRAM(s) Oral daily  metoprolol succinate ER 50 milliGRAM(s) Oral daily  aspirin enteric coated 81 milliGRAM(s) Oral daily  cinacalcet 60 milliGRAM(s) Oral daily  hydrALAZINE 100 milliGRAM(s) Oral every 8 hours  dextrose 50% Injectable 50 milliLiter(s) IV Push every 15 minutes  dextrose 50% Injectable 25 milliLiter(s) IV Push every 15 minutes  senna 2 Tablet(s) Oral at bedtime  docusate sodium 100 milliGRAM(s) Oral three times a day  heparin  Infusion. 1050 Unit(s)/Hr (10.5 mL/Hr) IV Continuous <Continuous>  epoetin azalea Injectable 33928 Unit(s) IV Push once  dextrose 5%. 1000 milliLiter(s) (50 mL/Hr) IV Continuous <Continuous>  dextrose 5%. 1000 milliLiter(s) (50 mL/Hr) IV Continuous <Continuous>  insulin lispro (HumaLOG) Pump 1 Each SubCutaneous Continuous Pump  sevelamer hydrochloride 2400 milliGRAM(s) Oral three times a day with meals    MEDICATIONS  (PRN):  oxyCODONE    5 mG/acetaminophen 325 mG 1 Tablet(s) Oral every 6 hours PRN Severe Pain (7 - 10)  acetaminophen   Tablet. 650 milliGRAM(s) Oral every 6 hours PRN Mild and/or moderate Pain  ondansetron Injectable 4 milliGRAM(s) IV Push every 6 hours PRN Nausea and/or Vomiting  dextrose Gel 1 Dose(s) Oral once PRN Blood Glucose LESS THAN 70 milliGRAM(s)/deciLiter  glucagon  Injectable 1 milliGRAM(s) IntraMuscular once PRN Glucose <70 milliGRAM(s)/deciLiter      LABS:                        9.0    4.7   )-----------( 226      ( 26 Jul 2017 14:23 )             27.4     Hgb Trend: 9.0<--, 9.0<--, 8.3<--, 8.4<--, 9.9<--  07-26    135  |  93<L>  |  27<H>  ----------------------------<  239<H>  5.4<H>   |  23  |  5.95<H>    Ca    8.3<L>      26 Jul 2017 14:23  Phos  4.7     07-26  Mg     2.2     07-26    TPro  6.7  /  Alb  3.9  /  TBili  0.2  /  DBili  x   /  AST  26  /  ALT  13  /  AlkPhos  232<H>  07-26    Creatinine Trend: 5.95<--, 5.75<--, 4.73<--, 4.32<--, 3.88<--, 3.59<--  PT/INR - ( 26 Jul 2017 14:23 )   PT: 10.7 sec;   INR: 0.98 ratio         PTT - ( 26 Jul 2017 15:35 )  PTT:43.5 sec      Venous Blood Gas:  07-26 @ 13:55  7.35/48/45/26/77  VBG Lactate: 1.0      Plan:     1) Type 1 Diabetes Mellitus with ketoacidosis  -Hold insulin pump  -Initiate insulin gtt per DKA protocol, with careful titration to avoid hypoglycemia  -Maintain patient NPO  -Zofran for nausea  -Fingersticks q1h  -BMP, Mg, Phos, VBG, Acetone q4h  - IV hydration as tolerated  - f/u Endocrine as needed      2) NSTEMI  -Discontinue Heparin gtt as patient completed duration of treatment per ACS protocol  -Continue Plavix and Aspirin for DAPT  -Continue Metoprolol  -Continue Atorvastatin  -Telemetry monitoring  -Serial EKG monitoring  -Trend Cardiac Enzymes as noted to have rise in troponin (patient also with known ESRD), CK stable.    -Follow up Cardiology as needed    3) ESRD  -Careful IV hydration as tolerated  -HD per renal  -Monitor electrolytes; noted mild hyperkalemia  -follow up with Nephrology as needed    4) Hyperparathyroid  -Continue Sensipar  -Follow up Endocrine as needed    5) Chronic Pain  -Pain Assessment per routine  -Continue Percocet as tolerated    6) Hyperkalemia  -BMP [id number] closely monitor for hypokalemia in setting of initiation of insulin gtt  -HD per renal  -Telemetry monitoring  -EKG    7) Depression  -Continue Cymbalta    8) PPX  -Heparin gtt  -GI ppx not indicated    MICU     This is a 62F with PMH T1DM on insulin pump with multiple complications including ESRD on HD (Tu/Th/Sat), retinopathy, neuropathy, hyperparathyroid, HTN, HLD, former smoker, gout, CAD s/p PCI on ASA and Plavix, dCHF, severe PVD s/p L&R fem-pop bypass graft, CVA, depression, chronic pain on oxycodone with multiple past admissions for DKA. Presented to ED from home on 7/23/17 with chief complaint of mid-sternal chest pain, found to have blood glucose >900. She was admitted to MICU for continued care with DKA, hyper-osmolar ketotic state with AMS and severe dehydration coupled with demand ischemia and metabolic derangements patient was stabilized and downgraded to the floor on her insulin regimen but continued to have labile glucose control patient this pm once again upgraded to the MICU for DKA mild , hyperkalemia, in the setting of nausea and recent NSTEMI. Care and treatment as detailed above. I have once again re-iterated the importance of dietary and medical compliance. Case discussed extensively with patient,  at the bedside, staff ,team and specialist on board. CHIEF COMPLAINT: Type 1 Diabetes Mellitus with ketoacidosis    HPI:    62F with PMH T1DM on insulin pump with multiple complications including ESRD on HD (Tu/Th/Sat), retinopathy, neuropathy, hyperparathyroid, HTN, HLD, former smoker, gout, CAD s/p PCI on ASA and Plavix, dCHF, severe PVD s/p L&R fem-pop bypass graft, CVA, depression, chronic pain on oxycodone with multiple past admissions for DKA. Presented to ED from home on 7/23/17 with chief complaint of mid-sternal chest pain, found to have blood glucose >900. She was admitted to MICU on insulin gtt for further DKA management and r/o ACS.  In the MICU, cardiac enzymes initially positive but peaked and decreasing. IV heparin initiated as per ACS protocol.  Patient already on DAPT with ASA and Plavix. Endocrinology was consulted. Patient was transitioned off Insulin GTT yesterday and onto her insulin pump when her AG closed.  She was then transferred to a medical floor for further management per Endocrinology.  While on the floor, she experienced several episodes of hypoglycemia requiring juice and dextrose as she reported taking full dose insulin via pump, but not eating well due to nausea and vomiting.  The insulin pump was then discontinued and she was started on D5W for continued hypoglycemia as patient was refusing to eat.  An RRT was called for weakness/lethargy, however, at that time her glucose was > 200 and the dextrose decreased.  Lab work was sent showing the patient was going back into DKA with small acetate and a beta hydroxy butyrate level of 1.8, glucose of 239, and an AG of 19.  Venous ph noted to be 7.35 with mild hyperkalemia of 5.4.  Patient accepted to MICU for intravenous insulin and management of DKA.        PAST MEDICAL & SURGICAL HISTORY:  Subclavian vein stenosis, left: s/p stent  Cellulitis: 2015 left foot  DKA, type 1: 1/2015  ACS (acute coronary syndrome): 1/2015 - cath revealed 100% ostial stenosis not amenable to PCI - medical management  MI, old  TIA (transient ischemic attack): x 2 - 8-9 years ago prior to ASD/VSD repair  CAD (coronary artery disease): s/p stents  Murmur, cardiac  Gout: past  CVA (cerebral infarction): with no residual, 8 yrs ago, prior to heart surgery - ST memory loss  Peripheral vascular disease: occluded left fem-pop bypass 5/2015  Diabetes mellitus type 1: Insulin Dependent - [employer]  Minimed Paradigm Insulin Pump - Novolog  ESRD (end stage renal disease): dialysis , tue, thursday, saturday  Hyperlipidemia  Status post device closure of ASD: &quot; clamshell&quot;  History of cardiac catheterization: 1/2015 - no intervention  S/P femoral-popliteal bypass surgery: L and R in 2013 with graft; 5/2015 CFA angioplasty left and ileofemoral endarterectomywith vein patch angioplasty of left fem-pop bypass graft  Multiple vascular surgery both leg, left fempop bypass revision 11/2015  AV (arteriovenous fistula): Left AV graft; revision with stent placement 2-3 years ago  S/P cholecystectomy      FAMILY HISTORY:  Family history of smoking  Family history of hypertension  Family history of cancer (Sibling)    Allergies: NKA      REVIEW OF SYSTEMS:  Constitutional: [ ] fevers [ ] chills [ ] weight loss [ ] weight gain  HEENT: [ ] dry eyes [ ] eye irritation [ ] postnasal drip [ ] nasal congestion  CV: [ ] chest pain [ ] orthopnea [ ] palpitations [ ] murmur  Resp: [ ] cough [ ] shortness of breath [ ] dyspnea [ ] wheezing [ ] sputum [ ] hemoptysis  GI: [x] nausea [x] vomiting [ ] diarrhea [ ] constipation [ ] abd pain [ ] dysphagia   : [ ] dysuria [ ] nocturia [ ] hematuria [ ] increased urinary frequency  Musculoskeletal: [ ] back pain [ ] myalgias [ ] arthralgias [ ] fracture  Skin: [ ] rash [ ] itch  Neurological: [ ] headache [ ] dizziness [ ] syncope [x] weakness [ ] numbness  Psychiatric: [ ] anxiety [ ] depression  Endocrine: [x] diabetes [ ] thyroid problem  Hematologic/Lymphatic: [ ] anemia [ ] bleeding problem  Allergic/Immunologic: [ ] itchy eyes [ ] nasal discharge [ ] hives [ ] angioedema  [x] All other systems negative  [ ] Unable to assess ROS because ________    OBJECTIVE:  ICU Vital Signs Last 24 Hrs  T(C): 36.8 (26 Jul 2017 17:12), Max: 36.8 (25 Jul 2017 20:00)  T(F): 98.2 (26 Jul 2017 17:12), Max: 98.3 (25 Jul 2017 20:00)  HR: 86 (26 Jul 2017 17:12) (70 - 86)  BP: 155/75 (26 Jul 2017 17:12) (127/68 - 171/83)  BP(mean): 109 (25 Jul 2017 20:00) (109 - 109)  ABP: 145/70 (25 Jul 2017 19:00) (145/70 - 173/58)  ABP(mean): 100 (25 Jul 2017 19:00) (100 - 107)  RR: 18 (26 Jul 2017 17:12) (14 - 23)  SpO2: 100% (26 Jul 2017 17:12) (97% - 100%)    Physical Exam:  Neuro: Alert & oriented x 4.  Pupils 3mm bilateral brisk  Pulm: CTA bilaterally. Unlabored, no respiratory distress  CV: S1, S2. No murmur, gallop, thrill appreciated  GI: Soft, nontender, positive bowel sounds in all four quadrants  Peripheral Vascular: +2 radial pulses bilaterally, +1 pedal pulses bilaterally. AVF left upper extremity      07-25 @ 07:01 - 07-26 @ 07:00  --------------------------------------------------------  IN: 1052.5 mL / OUT: 10 mL / NET: 1042.5 mL    07-26 @ 07:01 - 07-26 @ 17:34  --------------------------------------------------------  IN: 240 mL / OUT: 0 mL / NET: 240 mL      CAPILLARY BLOOD GLUCOSE  296 (26 Jul 2017 16:03)      HOSPITAL MEDICATIONS:  MEDICATIONS  (STANDING):  DULoxetine 60 milliGRAM(s) Oral daily  atorvastatin 20 milliGRAM(s) Oral at bedtime  clopidogrel Tablet 75 milliGRAM(s) Oral daily  metoprolol succinate ER 50 milliGRAM(s) Oral daily  aspirin enteric coated 81 milliGRAM(s) Oral daily  cinacalcet 60 milliGRAM(s) Oral daily  hydrALAZINE 100 milliGRAM(s) Oral every 8 hours  dextrose 50% Injectable 50 milliLiter(s) IV Push every 15 minutes  dextrose 50% Injectable 25 milliLiter(s) IV Push every 15 minutes  senna 2 Tablet(s) Oral at bedtime  docusate sodium 100 milliGRAM(s) Oral three times a day  heparin  Infusion. 1050 Unit(s)/Hr (10.5 mL/Hr) IV Continuous <Continuous>  epoetin azalea Injectable 89249 Unit(s) IV Push once  dextrose 5%. 1000 milliLiter(s) (50 mL/Hr) IV Continuous <Continuous>  dextrose 5%. 1000 milliLiter(s) (50 mL/Hr) IV Continuous <Continuous>  insulin lispro (HumaLOG) Pump 1 Each SubCutaneous Continuous Pump  sevelamer hydrochloride 2400 milliGRAM(s) Oral three times a day with meals    MEDICATIONS  (PRN):  oxyCODONE    5 mG/acetaminophen 325 mG 1 Tablet(s) Oral every 6 hours PRN Severe Pain (7 - 10)  acetaminophen   Tablet. 650 milliGRAM(s) Oral every 6 hours PRN Mild and/or moderate Pain  ondansetron Injectable 4 milliGRAM(s) IV Push every 6 hours PRN Nausea and/or Vomiting  dextrose Gel 1 Dose(s) Oral once PRN Blood Glucose LESS THAN 70 milliGRAM(s)/deciLiter  glucagon  Injectable 1 milliGRAM(s) IntraMuscular once PRN Glucose <70 milliGRAM(s)/deciLiter      LABS:                        9.0    4.7   )-----------( 226      ( 26 Jul 2017 14:23 )             27.4     Hgb Trend: 9.0<--, 9.0<--, 8.3<--, 8.4<--, 9.9<--  07-26    135  |  93<L>  |  27<H>  ----------------------------<  239<H>  5.4<H>   |  23  |  5.95<H>    Ca    8.3<L>      26 Jul 2017 14:23  Phos  4.7     07-26  Mg     2.2     07-26    TPro  6.7  /  Alb  3.9  /  TBili  0.2  /  DBili  x   /  AST  26  /  ALT  13  /  AlkPhos  232<H>  07-26    Creatinine Trend: 5.95<--, 5.75<--, 4.73<--, 4.32<--, 3.88<--, 3.59<--  PT/INR - ( 26 Jul 2017 14:23 )   PT: 10.7 sec;   INR: 0.98 ratio         PTT - ( 26 Jul 2017 15:35 )  PTT:43.5 sec      Venous Blood Gas:  07-26 @ 13:55  7.35/48/45/26/77  VBG Lactate: 1.0      Plan:     1) Type 1 Diabetes Mellitus with ketoacidosis  -Hold insulin pump  -Initiate insulin gtt per DKA protocol, with careful titration to avoid hypoglycemia  -Maintain patient NPO  -Zofran for nausea  -Fingersticks q1h  -BMP, Mg, Phos, VBG, Acetone q4h  - IV hydration as tolerated  - f/u Endocrine as needed      2) NSTEMI  -Discontinue Heparin gtt as patient completed duration of treatment per ACS protocol  -Continue Plavix and Aspirin for DAPT  -Continue Metoprolol  -Continue Atorvastatin  -Telemetry monitoring  -Serial EKG monitoring  -Trend Cardiac Enzymes as noted to have rise in troponin (patient also with known ESRD), CK stable.    -Follow up Cardiology as needed    3) ESRD  -Careful IV hydration as tolerated  -HD per renal  -Monitor electrolytes; noted mild hyperkalemia  -follow up with Nephrology as needed    4) Hyperparathyroid  -Continue Sensipar  -Follow up Endocrine as needed    5) Chronic Pain  -Pain Assessment per routine  -Continue Percocet as tolerated    6) Hyperkalemia  -BMP [id number] closely monitor for hypokalemia in setting of initiation of insulin gtt  -HD per renal  -Telemetry monitoring  -EKG    7) Depression  -Continue Cymbalta    8) PPX  -Heparin gtt  -GI ppx not indicated    MICU     This is a 62F with PMH T1DM on insulin pump with multiple complications including ESRD on HD (Tu/Th/Sat), retinopathy, neuropathy, hyperparathyroid, HTN, HLD, former smoker, gout, CAD s/p PCI on ASA and Plavix, dCHF, severe PVD s/p L&R fem-pop bypass graft, CVA, depression, chronic pain on oxycodone with multiple past admissions for DKA. Presented to ED from home on 7/23/17 with chief complaint of mid-sternal chest pain, found to have blood glucose >900. She was admitted to MICU for continued care with DKA, hyper-osmolar ketotic state with AMS and severe dehydration coupled with demand ischemia and metabolic derangements patient was stabilized and downgraded to the floor on her insulin regimen but continued to have labile glucose control patient this pm once again upgraded to the MICU for DKA mild , hyperkalemia, in the setting of nausea and recent NSTEMI and encephalopathy. Care and treatment as detailed above. I have once again re-iterated the importance of dietary and medical compliance. Case discussed extensively with patient,  at the bedside, staff ,team and specialist on board.

## 2017-07-26 NOTE — CHART NOTE - NSCHARTNOTEFT_GEN_A_CORE
medicine NP [id number]    s/w Dr. Arellano(Endocrine [id number]) covering for Dr. ANGELICA Colbert;  [facility]  MICU MD(s [phone])notified; will eval. pt.  HD  called for possible HD in room

## 2017-07-26 NOTE — CHART NOTE - NSCHARTNOTEFT_GEN_A_CORE
62F with DM1 (on insulin pump), ESRD on HD, HTN, HLD, CAD s/p PCI to RCA, 62F with DM1 (on insulin pump) c/b ESRD on HD via L AVF, severe PVD s/p B/L fem-pop bypass grafts with multiple revisions, CAD s/p PCI to RCA, and CVA with vascular dementia; also with HTN, HLD, depression, chronic pain with RRT called for hypoglycemia.  Patient initially was admitted to the MICU for DKA and NSTEMI.  Patient is now s/p insulin gtt but still on heparin gtt for NSTEMI.  Patient since transfer to Medicine from MICU has had very poor appetite and this morning, had symptomatic hypoglycemia down to FS 43 with three episodes of N/V.  Patient was given half an amp of D50 with appropriate response as well as initiation of D5 at 50cc/h.    At time of rapid, patient's FS was 202.  Patient stated she wasn't feeling well but was unable to articulate specific symptoms; she was not nauseated, however.    Endocrine (house) made aware of patient's symptoms this morning and ADS NP was advised to restart insulin pump with D5 on board.  At end of RRT,  and so D5 downtitrated to 25cc/h.    Tele with NSR 60s-80s.  EKG was performed and was NSR with QTc 495 in setting of hypocalcemia 7.9 with albumin WNL yesterday.  Labs repeated as patient was pending HD today but was unable to go as she was not feeling well.  Lactate on VBG 1.0, iCa 1.04, Hb stable 8.9.  Ca on CMP 8.3.  Kyra repeated with uptitrating troponins, from 1.18 to 1.38 with patient pending HD.  BHB also repeated and now 1.8 (from 0.2).    Advised nursing to check FS hourly for next 2-3h after initiation of insulin pump for titration of D5 and then to advance time interval of FS if appropriate following.    Seen and d/w Dr Morris.  Dr Viera made aware.  -LOS Garcia PGY-3    Addendum 3:30pm: Spoke with NP Basilia Warren at [phone] and notified her of elevated BHB.  Advised conversation with Seven.

## 2017-07-26 NOTE — PROGRESS NOTE ADULT - ASSESSMENT
62 yo F with DM, ESRD. PVD with recurrent DKA/hyperlgycemia. has hx of LLE bypass as well  1- DM/DKA  2- ESRD  3- recent hyperkalemia on admission   4- NSTEMI  5- htn     insulin to cont  asa/plavix   hd am  lipitor    resume lisinopril   PT

## 2017-07-26 NOTE — PROGRESS NOTE ADULT - ASSESSMENT
63 yo female with hx of T1DM controlled with pvd s/p fem-pop b/l, retinopathy, CAD, ESRD on HD, and neuropathy here with DKA and NSTEMI. On insulin pump at home.

## 2017-07-26 NOTE — CHART NOTE - NSCHARTNOTEFT_GEN_A_CORE
Medicine NP s [phone]    Beta hydroxy Butyrate 1.8. Dr. Viera informed request MICU consult/transfer  MICU MD notified of consult  Endocrine resident notified; he will call attending Endocrinologist for further orders/ recs.

## 2017-07-26 NOTE — PROGRESS NOTE ADULT - SUBJECTIVE AND OBJECTIVE BOX
Chief Complaint: type 1 diabetes     Interval history: Transitioned to pump yesterday evening and insulin gtt stopped at 5 pm.  Pt remains on her medtronic pump, has adequate supplies here.  Denies sx today, endorses improving appetite but states that last night, she took full dose of insulin (using bolus wizard) but then barely ate.  later had low BG which was corrected with juice.  She did not feel sx as she states she has chronic hypoglycemic unawareness.     MEDICATIONS  (STANDING):  DULoxetine 60 milliGRAM(s) Oral daily  atorvastatin 20 milliGRAM(s) Oral at bedtime  clopidogrel Tablet 75 milliGRAM(s) Oral daily  metoprolol succinate ER 50 milliGRAM(s) Oral daily  aspirin enteric coated 81 milliGRAM(s) Oral daily  cinacalcet 60 milliGRAM(s) Oral daily  sevelamer hydrochloride 2400 milliGRAM(s) Oral every 8 hours  hydrALAZINE 100 milliGRAM(s) Oral every 8 hours  dextrose 50% Injectable 50 milliLiter(s) IV Push every 15 minutes  dextrose 50% Injectable 25 milliLiter(s) IV Push every 15 minutes  senna 2 Tablet(s) Oral at bedtime  docusate sodium 100 milliGRAM(s) Oral three times a day  heparin  Infusion. 1050 Unit(s)/Hr (10.5 mL/Hr) IV Continuous <Continuous>  insulin lispro (HumaLOG) Pump 1 Each SubCutaneous Continuous Pump  epoetin azalea Injectable 13876 Unit(s) IV Push once    MEDICATIONS  (PRN):  oxyCODONE    5 mG/acetaminophen 325 mG 1 Tablet(s) Oral every 6 hours PRN Severe Pain (7 - 10)  acetaminophen   Tablet. 650 milliGRAM(s) Oral every 6 hours PRN Mild and/or moderate Pain  ondansetron Injectable 4 milliGRAM(s) IV Push every 6 hours PRN Nausea and/or Vomiting      Allergies    No Known Allergies    Intolerances      Review of Systems:  Skin: no rash  Psych: no depression  Endocrine: no polyuria, polydipsia      ALL OTHER SYSTEMS REVIEWED AND NEGATIVE    PHYSICAL EXAM:  VITALS: T(C): 36.4 (07-26-17 @ 09:00)  T(F): 97.5 (07-26-17 @ 09:00), Max: 98.3 (07-25-17 @ 12:00)  HR: 75 (07-26-17 @ 09:00) (74 - 82)  BP: 163/71 (07-26-17 @ 09:00) (86/39 - 163/71)  RR:  (14 - 23)  SpO2:  (95% - 100%)  Wt(kg): --  GENERAL: NAD, well-developed  GI: Soft, nontender, non distended, normal bowel sounds  SKIN: Dry, intact, No rashes or lesions  PSYCH: Alert and oriented x 3, normal affect, normal mood    CAPILLARY BLOOD GLUCOSE  62 (07-26 @ 10:34)  60 (07-26 @ 10:33)  56 (07-26 @ 10:32)  57 (07-26 @ 10:29)  76 (07-26 @ 10:28)  25 (07-26 @ 10:27)  209 (07-26 @ 06:07)  107 (07-26 @ 02:32)  68 (07-26 @ 02:17)  49 (07-26 @ 02:02)  49 (07-26 @ 02:00)  126 (07-25 @ 21:26)  206 (07-25 @ 19:00)  146 (07-25 @ 18:00)  99 (07-25 @ 17:00)  122 (07-25 @ 16:00)  166 (07-25 @ 15:00)  233 (07-25 @ 14:00)  238 (07-25 @ 13:00)  262 (07-25 @ 12:00)  258 (07-25 @ 11:00)  252 (07-25 @ 10:00)  179 (07-25 @ 09:00)  123 (07-25 @ 08:00)  142 (07-25 @ 07:00)  140 (07-25 @ 06:00)  165 (07-25 @ 05:00)  174 (07-25 @ 04:00)  93 (07-25 @ 03:00)  98 (07-25 @ 02:00)  115 (07-25 @ 01:00)  148 (07-25 @ 00:00)  176 (07-24 @ 23:00)  129 (07-24 @ 22:00)  119 (07-24 @ 21:00)  107 (07-24 @ 20:00)  109 (07-24 @ 19:00)  119 (07-24 @ 18:00)  133 (07-24 @ 17:00)  164 (07-24 @ 16:00)  214 (07-24 @ 15:00)  261 (07-24 @ 14:00)  228 (07-24 @ 13:00)  216 (07-24 @ 12:00)  201 (07-24 @ 11:00)  185 (07-24 @ 10:00)  179 (07-24 @ 09:00)  123 (07-24 @ 08:00)  138 (07-24 @ 07:00)  147 (07-24 @ 06:00)  235 (07-24 @ 05:00)  299 (07-24 @ 04:00)  376 (07-24 @ 03:00)  494 (07-24 @ 02:00)  508 (07-24 @ 01:08)      07-25    136  |  96  |  20  ----------------------------<  154<H>  4.8   |  25  |  4.73<H>    EGFR if : 11<L>  EGFR if non : 9<L>    Ca    7.7<L>      07-25  Mg     2.1     07-25  Phos  4.1     07-25    TPro  6.7  /  Alb  3.9  /  TBili  0.3  /  DBili  x   /  AST  25  /  ALT  12  /  AlkPhos  248<H>  07-24          Thyroid Function Tests:      Hemoglobin A1C, Whole Blood: 6.8 % <H> [4.0 - 5.6] (07-24-17 @ 06:15)  Hemoglobin A1C, Whole Blood: 6.8 % <H> [4.0 - 5.6] (07-23-17 @ 22:45)  Hemoglobin A1C, Whole Blood: 7.3 % <H> [4.0 - 5.6] (06-16-17 @ 04:45) Chief Complaint: type 1 diabetes     Interval history: Transitioned to pump yesterday evening and insulin gtt stopped at 5 pm.  Pt remains on her medtronic pump, has adequate supplies here.  Denies sx today, endorses improving appetite but states that last night, she took full dose of insulin (using bolus wizard) but then barely ate.  later had low BG which was corrected with juice.  She did not feel sx as she states she has chronic hypoglycemic unawareness.     Later was notified that at 10:30 am  pt had low to 20s, given juice x2, as pt refused D50 noting it would make her sugar very high.  She also did not feel this event.  She did bolus for breakfast (6 units using wizard), and ate entire meal.    MEDICATIONS  (STANDING):  DULoxetine 60 milliGRAM(s) Oral daily  atorvastatin 20 milliGRAM(s) Oral at bedtime  clopidogrel Tablet 75 milliGRAM(s) Oral daily  metoprolol succinate ER 50 milliGRAM(s) Oral daily  aspirin enteric coated 81 milliGRAM(s) Oral daily  cinacalcet 60 milliGRAM(s) Oral daily  sevelamer hydrochloride 2400 milliGRAM(s) Oral every 8 hours  hydrALAZINE 100 milliGRAM(s) Oral every 8 hours  dextrose 50% Injectable 50 milliLiter(s) IV Push every 15 minutes  dextrose 50% Injectable 25 milliLiter(s) IV Push every 15 minutes  senna 2 Tablet(s) Oral at bedtime  docusate sodium 100 milliGRAM(s) Oral three times a day  heparin  Infusion. 1050 Unit(s)/Hr (10.5 mL/Hr) IV Continuous <Continuous>  insulin lispro (HumaLOG) Pump 1 Each SubCutaneous Continuous Pump  epoetin azalea Injectable 36684 Unit(s) IV Push once    MEDICATIONS  (PRN):  oxyCODONE    5 mG/acetaminophen 325 mG 1 Tablet(s) Oral every 6 hours PRN Severe Pain (7 - 10)  acetaminophen   Tablet. 650 milliGRAM(s) Oral every 6 hours PRN Mild and/or moderate Pain  ondansetron Injectable 4 milliGRAM(s) IV Push every 6 hours PRN Nausea and/or Vomiting      Allergies    No Known Allergies    Intolerances      Review of Systems:  Skin: no rash  Psych: no depression  Endocrine: no polyuria, polydipsia      ALL OTHER SYSTEMS REVIEWED AND NEGATIVE    PHYSICAL EXAM:  VITALS: T(C): 36.4 (07-26-17 @ 09:00)  T(F): 97.5 (07-26-17 @ 09:00), Max: 98.3 (07-25-17 @ 12:00)  HR: 75 (07-26-17 @ 09:00) (74 - 82)  BP: 163/71 (07-26-17 @ 09:00) (86/39 - 163/71)  RR:  (14 - 23)  SpO2:  (95% - 100%)  Wt(kg): --  GENERAL: NAD, well-developed  GI: Soft, nontender, non distended, normal bowel sounds  SKIN: Dry, intact, No rashes or lesions  PSYCH: Alert and oriented x 3, normal affect, normal mood    CAPILLARY BLOOD GLUCOSE  62 (07-26 @ 10:34)  60 (07-26 @ 10:33)  56 (07-26 @ 10:32)  57 (07-26 @ 10:29)  76 (07-26 @ 10:28)  25 (07-26 @ 10:27)  209 (07-26 @ 06:07)  107 (07-26 @ 02:32)  68 (07-26 @ 02:17)  49 (07-26 @ 02:02)  49 (07-26 @ 02:00)  126 (07-25 @ 21:26)  206 (07-25 @ 19:00)  146 (07-25 @ 18:00)  99 (07-25 @ 17:00)  122 (07-25 @ 16:00)  166 (07-25 @ 15:00)  233 (07-25 @ 14:00)  238 (07-25 @ 13:00)  262 (07-25 @ 12:00)  258 (07-25 @ 11:00)  252 (07-25 @ 10:00)  179 (07-25 @ 09:00)  123 (07-25 @ 08:00)  142 (07-25 @ 07:00)  140 (07-25 @ 06:00)  165 (07-25 @ 05:00)  174 (07-25 @ 04:00)  93 (07-25 @ 03:00)  98 (07-25 @ 02:00)  115 (07-25 @ 01:00)  148 (07-25 @ 00:00)  176 (07-24 @ 23:00)  129 (07-24 @ 22:00)  119 (07-24 @ 21:00)  107 (07-24 @ 20:00)  109 (07-24 @ 19:00)  119 (07-24 @ 18:00)  133 (07-24 @ 17:00)  164 (07-24 @ 16:00)  214 (07-24 @ 15:00)  261 (07-24 @ 14:00)  228 (07-24 @ 13:00)  216 (07-24 @ 12:00)  201 (07-24 @ 11:00)  185 (07-24 @ 10:00)  179 (07-24 @ 09:00)  123 (07-24 @ 08:00)  138 (07-24 @ 07:00)  147 (07-24 @ 06:00)  235 (07-24 @ 05:00)  299 (07-24 @ 04:00)  376 (07-24 @ 03:00)  494 (07-24 @ 02:00)  508 (07-24 @ 01:08)      07-25    136  |  96  |  20  ----------------------------<  154<H>  4.8   |  25  |  4.73<H>    EGFR if : 11<L>  EGFR if non : 9<L>    Ca    7.7<L>      07-25  Mg     2.1     07-25  Phos  4.1     07-25    TPro  6.7  /  Alb  3.9  /  TBili  0.3  /  DBili  x   /  AST  25  /  ALT  12  /  AlkPhos  248<H>  07-24          Thyroid Function Tests:      Hemoglobin A1C, Whole Blood: 6.8 % <H> [4.0 - 5.6] (07-24-17 @ 06:15)  Hemoglobin A1C, Whole Blood: 6.8 % <H> [4.0 - 5.6] (07-23-17 @ 22:45)  Hemoglobin A1C, Whole Blood: 7.3 % <H> [4.0 - 5.6] (06-16-17 @ 04:45)

## 2017-07-26 NOTE — PROGRESS NOTE ADULT - ASSESSMENT
62 f with DKA / diabetes Mellitus type 1  BS control/ endocrine follow. DKA- ICU reeval re Insulin gtt.  s/p NSTEMI-AC, asa/Plavix. Cardiology follow, pain control  ESRD- continue HD  Peripheral Vascular disease- conservative management.  Pierce Viera MD pager [phone]

## 2017-07-26 NOTE — PROVIDER CONTACT NOTE (OTHER) - ACTION/TREATMENT ORDERED:
Follow hypoglycemia protocol; administer 15 gm carbohydrates and recheck blood glucose in 15 minutes. Repeat until blood glucose over 100.  after 45gm PO carbohydrates.

## 2017-07-26 NOTE — PROGRESS NOTE ADULT - SUBJECTIVE AND OBJECTIVE BOX
Patient is a 62y old  Female who presents with a chief complaint of     SUBJECTIVE / OVERNIGHT EVENTS: lethargic. Family at bedside.   Review of Systems  chest pain no  palpitations no  sob no  nausea no  headache no    MEDICATIONS  (STANDING):  DULoxetine 60 milliGRAM(s) Oral daily  atorvastatin 20 milliGRAM(s) Oral at bedtime  clopidogrel Tablet 75 milliGRAM(s) Oral daily  metoprolol succinate ER 50 milliGRAM(s) Oral daily  aspirin enteric coated 81 milliGRAM(s) Oral daily  cinacalcet 60 milliGRAM(s) Oral daily  hydrALAZINE 100 milliGRAM(s) Oral every 8 hours  dextrose 50% Injectable 50 milliLiter(s) IV Push every 15 minutes  dextrose 50% Injectable 25 milliLiter(s) IV Push every 15 minutes  senna 2 Tablet(s) Oral at bedtime  docusate sodium 100 milliGRAM(s) Oral three times a day  heparin  Infusion. 1050 Unit(s)/Hr (10.5 mL/Hr) IV Continuous <Continuous>  epoetin azalea Injectable 63989 Unit(s) IV Push once  dextrose 5%. 1000 milliLiter(s) (50 mL/Hr) IV Continuous <Continuous>  dextrose 5%. 1000 milliLiter(s) (50 mL/Hr) IV Continuous <Continuous>  insulin lispro (HumaLOG) Pump 1 Each SubCutaneous Continuous Pump  sevelamer hydrochloride 2400 milliGRAM(s) Oral three times a day with meals    MEDICATIONS  (PRN):  oxyCODONE    5 mG/acetaminophen 325 mG 1 Tablet(s) Oral every 6 hours PRN Severe Pain (7 - 10)  acetaminophen   Tablet. 650 milliGRAM(s) Oral every 6 hours PRN Mild and/or moderate Pain  ondansetron Injectable 4 milliGRAM(s) IV Push every 6 hours PRN Nausea and/or Vomiting  dextrose Gel 1 Dose(s) Oral once PRN Blood Glucose LESS THAN 70 milliGRAM(s)/deciLiter  glucagon  Injectable 1 milliGRAM(s) IntraMuscular once PRN Glucose <70 milliGRAM(s)/deciLiter          PHYSICAL EXAM:  GENERAL: NAD, well-developed  HEAD:  Atraumatic, Normocephalic  EYES: EOMI, PERRLA, conjunctiva and sclera clear  NECK: Supple, No JVD  CHEST/LUNG: Clear to auscultation bilaterally; No wheeze  HEART: Regular rate and rhythm; No murmurs, rubs, or gallops  ABDOMEN: Soft, Nontender, Nondistended; Bowel sounds present  EXTREMITIES:  2+ Peripheral Pulses, No clubbing, cyanosis, or edema  PSYCH: AAOx3  NEUROLOGY: non-focal  SKIN: No rashes or lesions    LABS:                        9.0    4.7   )-----------( 226      ( 26 Jul 2017 14:23 )             27.4     07-26    135  |  93<L>  |  27<H>  ----------------------------<  239<H>  5.4<H>   |  23  |  5.95<H>    Ca    8.3<L>      26 Jul 2017 14:23  Phos  4.7     07-26  Mg     2.2     07-26    TPro  6.7  /  Alb  3.9  /  TBili  0.2  /  DBili  x   /  AST  26  /  ALT  13  /  AlkPhos  232<H>  07-26    PT/INR - ( 26 Jul 2017 14:23 )   PT: 10.7 sec;   INR: 0.98 ratio         PTT - ( 26 Jul 2017 15:35 )  PTT:43.5 sec  CARDIAC MARKERS ( 26 Jul 2017 14:23 )  x     / 1.38 ng/mL / 180 U/L / x     / 5.2 ng/mL  CARDIAC MARKERS ( 25 Jul 2017 00:14 )  x     / 1.18 ng/mL / 173 U/L / x     / 10.9 ng/mL          RADIOLOGY & ADDITIONAL TESTS:    Imaging Personally Reviewed:    Consultant(s) Notes Reviewed:      Care Discussed with Consultants/Other Providers:

## 2017-07-26 NOTE — PROGRESS NOTE ADULT - SUBJECTIVE AND OBJECTIVE BOX
[facility]   [phone]  RENAL FOLLOW UP NOTE  --------------------------------------------------------------------------------  Chief Complaint:    24 hour events/subjective:  states beginning to feel a little better. no cp or palp       PAST HISTORY  --------------------------------------------------------------------------------  No significant changes to PMH, PSH, FHx, SHx, unless otherwise noted    ALLERGIES & MEDICATIONS  --------------------------------------------------------------------------------  Allergies    No Known Allergies    Intolerances      Standing Inpatient Medications  DULoxetine 60 milliGRAM(s) Oral daily  atorvastatin 20 milliGRAM(s) Oral at bedtime  clopidogrel Tablet 75 milliGRAM(s) Oral daily  metoprolol succinate ER 50 milliGRAM(s) Oral daily  aspirin enteric coated 81 milliGRAM(s) Oral daily  cinacalcet 60 milliGRAM(s) Oral daily  sevelamer hydrochloride 2400 milliGRAM(s) Oral every 8 hours  hydrALAZINE 100 milliGRAM(s) Oral every 8 hours  dextrose 50% Injectable 50 milliLiter(s) IV Push every 15 minutes  dextrose 50% Injectable 25 milliLiter(s) IV Push every 15 minutes  senna 2 Tablet(s) Oral at bedtime  docusate sodium 100 milliGRAM(s) Oral three times a day  heparin  Infusion. 1050 Unit(s)/Hr IV Continuous <Continuous>  insulin lispro (HumaLOG) Pump 1 Each SubCutaneous Continuous Pump  epoetin azalea Injectable 94806 Unit(s) IV Push once    PRN Inpatient Medications  oxyCODONE    5 mG/acetaminophen 325 mG 1 Tablet(s) Oral every 6 hours PRN  acetaminophen   Tablet. 650 milliGRAM(s) Oral every 6 hours PRN  ondansetron Injectable 4 milliGRAM(s) IV Push every 6 hours PRN      REVIEW OF SYSTEMS  --------------------------------------------------------------------------------    Gen: denies  fevers/chills,  CVS: denies chest pain/palpitations  Resp: denies SOB/Cough  GI: Denies N/V/Abd pain  + edema left foot.     All other systems were reviewed and are negative, except as noted.    VITALS/PHYSICAL EXAM  --------------------------------------------------------------------------------  T(C): 36.4 (07-26-17 @ 09:00), Max: 36.8 (07-25-17 @ 12:00)  HR: 75 (07-26-17 @ 09:00) (74 - 82)  BP: 163/71 (07-26-17 @ 09:00) (86/39 - 163/71)  RR: 18 (07-26-17 @ 09:00) (14 - 23)  SpO2: 99% (07-26-17 @ 09:00) (95% - 100%)  Wt(kg): --        07-25-17 @ 07:01  -  07-26-17 @ 07:00  --------------------------------------------------------  IN: 1052.5 mL / OUT: 10 mL / NET: 1042.5 mL    07-26-17 @ 07:01  -  07-26-17 @ 10:10  --------------------------------------------------------  IN: 240 mL / OUT: 0 mL / NET: 240 mL      Physical Exam:  	    Physical Exam:  	Gen: alert oriented place person and date   	Pulm: Decreased breath sounds b/l bases. no rales or ronchi or wheezing  	CV: RRR, S1/S2. no rub  	Back: No CVA tenderness; no sacral edema  	Abd: +BS, soft, nontender/nondistended  	: No suprapubic tenderness.               Extremity: No cyanosis, LLE 1++ edema no clubbing  	    LABS/STUDIES  --------------------------------------------------------------------------------              9.0    4.9   >-----------<  231      [07-25-17 @ 00:13]              27.2     136  |  96  |  20  ----------------------------<  154      [07-25-17 @ 18:22]  4.8   |  25  |  4.73        Ca     7.7     [07-25-17 @ 18:22]      Mg     2.1     [07-25-17 @ 18:22]      Phos  4.1     [07-25-17 @ 18:22]      PT/INR: PT 11.0 , INR 1.01       [07-24-17 @ 22:47]  PTT: 50.3       [07-26-17 @ 08:46]    Troponin 1.18      [07-25-17 @ 00:14]        [07-25-17 @ 00:14]    Creatinine Trend:  SCr 4.73 [07-25 @ 18:22]  SCr 4.32 [07-25 @ 13:27]  SCr 3.88 [07-25 @ 09:03]  SCr 3.59 [07-25 @ 04:28]  SCr 3.33 [07-25 @ 00:14]                  HbA1c 6.8      [07-24-17 @ 06:15]

## 2017-07-26 NOTE — CHART NOTE - NSCHARTNOTEFT_GEN_A_CORE
Medicine NP s [id number]  hypoglycemia earlier today; seen by Endocrine Insulin pump held x 1 hr & Insulin pump doses decreased  Pt after seen by Endocrine began vomiting after seen by Endocrine; refusing to eat  repeat FS remains hypoglycemic; D50% 25ml IV given & D5W @ 50ml/hr began per protocol   FS increased to 165 then decreased to  131. Medicine NP s [id number]  hypoglycemia earlier today; seen by Endocrine MD at bedside; Insulin pump held x 1 hr & Insulin dosage decreased  Repeat FS remains hypoglycemic; D50% 25ml IV given & D5W @ 50ml/hr began per protocol   FS increased to 165 then decreased to  131. Pt refusing food due to nausea; Zofran 4mg IV given   Dr. Viera notified events; c/w Endocrine orders  S/w Endocrine resident; c/w Insulin pump & D5W while pt not eating. If BS becomes elevated D/C D5W   1340 RRT called due to weakness/vomiting; team responded & eval. pt. for MICU. Pt not candidate @ present for MICU per Team.  FSBS 265; D5W decreased to 25ml/hr  by RRT team since pt remains nauseous after Zofran- refusing food.  Dr. Vela called for cardio. consult requested by Dr. Viera Medicine NP s [id number]  Hypoglycemia earlier today; seen by Endocrine MD at bedside; Insulin pump held x 1 hr & Insulin dosage decreased  Repeat FS remains hypoglycemic; D50% 25ml IV given & D5W @ 50ml/hr began per protocol   FS increased to 165 then decreased to  131. Pt refusing food due to nausea; Zofran 4mg IV given   Dr. Viera notified events; c/w Endocrine orders  S/w Endocrine resident; c/w Insulin pump & D5W while pt not eating. If BS becomes elevated D/C D5W   1340 RRT called due to weakness/vomiting; team responded & eval. pt. for MICU. Pt not candidate @ present for MICU per Team.  FSBS 265; D5W decreased to 25ml/hr  by RRT team since pt remains nauseous after Zofran- refusing food.  Dr. Vela called for cardio. consult requested by Dr. Viera  1430; Dr. Barron covering for cardio; notified of RRT

## 2017-07-26 NOTE — PROGRESS NOTE ADULT - PROBLEM SELECTOR PLAN 1
Pt now back on pump with biggest concern being a low BG yesterday evening after bolusing and then not eating. She understands not to do this and suggested that she try bolusing after her meals while here if there is any concern for poor appetite.    -Later notified that pt was persistently low through morning (not documented in system) - will reduce her basal rates therefore by 10% across the board to try to prevent further lows Pt now back on pump with biggest concern being a low BG yesterday evening after bolusing and then not eating. She understands not to do this and suggested that she try bolusing after her meals while here if there is any concern for poor appetite.    -Later notified that pt was persistently low through morning so basals were all reduced by about 10% and bolus CHO ratio changed - appears that her CHO ratio is very robust compared to her basal rates so suspect this was the paramount issue; kept same sensitivity and IOB time  -I also did temp basal of 0 for 1 hour to ensure she had time to drink juice and get BG recheck  -discussed plan with pt, nurse, and provider Pt now back on pump with biggest concern being a low BG yesterday evening after bolusing and then not eating. She understands not to do this and suggested that she try bolusing after her meals while here if there is any concern for poor appetite.    -Later notified that pt was persistently low through morning so basals were all reduced by about 10% and bolus CHO ratio changed - appears that her CHO ratio is very robust compared to her basal rates so suspect this was the paramount issue; kept same sensitivity and IOB time  -I also did temp basal of 0% for 1 hour to ensure she had time to drink juice and get BG recheck  -discussed plan with pt, nurse, and provider Pt now back on pump with biggest concern being a low BG yesterday evening after bolusing and then not eating. She understands not to do this and suggested that she try bolusing after her meals while here if there is any concern for poor appetite.    -Later notified that pt was persistently low through morning so basals were all reduced by about 10% and bolus CHO ratio changed - appears that her CHO ratio is very robust compared to her basal rates so suspect this was the paramount issue; kept same sensitivity and IOB time  -I also did temp basal of 0% for 1 hour to ensure she had time to drink juice and get BG recheck  -discussed plan with pt, nurse, and provider    Addendum : later spoke with primary team again.  pt had RRT called for lethargy, later had BOHB drawn which was elevated with normal bicarb, K of 5.4.  Nephrology would like to dialyze her as HD has been held recently.  Team called MICU to see about possible IV insulin infusion.  Optimally, pt would have IV insulin administered via insulin gtt given that she is already being managed with SubQ insulin via pump and still has mounting ketosis.  Fluids would also be helpful but are limited by fact that she is volume tenuous with ESRD.  Would continue to closely monitor BG q2h overnight to check for both high and low sugars.  If she does not go to the MICU, needs close attention to subQ regimen (currently on pump).  If unable to manage pump or lethargic overnight , could switch to 8 units lantus daily and carb ratio of 1:12 with meals (pt carb counts).

## 2017-07-27 LAB
ANION GAP SERPL CALC-SCNC: 10 MMOL/L — SIGNIFICANT CHANGE UP (ref 5–17)
ANION GAP SERPL CALC-SCNC: 11 MMOL/L — SIGNIFICANT CHANGE UP (ref 5–17)
ANION GAP SERPL CALC-SCNC: 13 MMOL/L — SIGNIFICANT CHANGE UP (ref 5–17)
ANION GAP SERPL CALC-SCNC: 14 MMOL/L — SIGNIFICANT CHANGE UP (ref 5–17)
ANION GAP SERPL CALC-SCNC: 19 MMOL/L — HIGH (ref 5–17)
ANION GAP SERPL CALC-SCNC: 7 MMOL/L — SIGNIFICANT CHANGE UP (ref 5–17)
APTT BLD: 28.9 SEC — SIGNIFICANT CHANGE UP (ref 27.5–37.4)
BUN SERPL-MCNC: 10 MG/DL — SIGNIFICANT CHANGE UP (ref 7–23)
BUN SERPL-MCNC: 10 MG/DL — SIGNIFICANT CHANGE UP (ref 7–23)
BUN SERPL-MCNC: 14 MG/DL — SIGNIFICANT CHANGE UP (ref 7–23)
BUN SERPL-MCNC: 17 MG/DL — SIGNIFICANT CHANGE UP (ref 7–23)
BUN SERPL-MCNC: 29 MG/DL — HIGH (ref 7–23)
BUN SERPL-MCNC: 7 MG/DL — SIGNIFICANT CHANGE UP (ref 7–23)
CALCIUM SERPL-MCNC: 7.6 MG/DL — LOW (ref 8.4–10.5)
CALCIUM SERPL-MCNC: 7.7 MG/DL — LOW (ref 8.4–10.5)
CALCIUM SERPL-MCNC: 7.8 MG/DL — LOW (ref 8.4–10.5)
CALCIUM SERPL-MCNC: 8 MG/DL — LOW (ref 8.4–10.5)
CALCIUM SERPL-MCNC: 8.1 MG/DL — LOW (ref 8.4–10.5)
CALCIUM SERPL-MCNC: 8.3 MG/DL — LOW (ref 8.4–10.5)
CHLORIDE SERPL-SCNC: 92 MMOL/L — LOW (ref 96–108)
CHLORIDE SERPL-SCNC: 94 MMOL/L — LOW (ref 96–108)
CHLORIDE SERPL-SCNC: 94 MMOL/L — LOW (ref 96–108)
CHLORIDE SERPL-SCNC: 95 MMOL/L — LOW (ref 96–108)
CHLORIDE SERPL-SCNC: 96 MMOL/L — SIGNIFICANT CHANGE UP (ref 96–108)
CHLORIDE SERPL-SCNC: 98 MMOL/L — SIGNIFICANT CHANGE UP (ref 96–108)
CO2 SERPL-SCNC: 21 MMOL/L — LOW (ref 22–31)
CO2 SERPL-SCNC: 28 MMOL/L — SIGNIFICANT CHANGE UP (ref 22–31)
CO2 SERPL-SCNC: 28 MMOL/L — SIGNIFICANT CHANGE UP (ref 22–31)
CO2 SERPL-SCNC: 30 MMOL/L — SIGNIFICANT CHANGE UP (ref 22–31)
CO2 SERPL-SCNC: 30 MMOL/L — SIGNIFICANT CHANGE UP (ref 22–31)
CO2 SERPL-SCNC: 34 MMOL/L — HIGH (ref 22–31)
CREAT SERPL-MCNC: 2.02 MG/DL — HIGH (ref 0.5–1.3)
CREAT SERPL-MCNC: 3.1 MG/DL — HIGH (ref 0.5–1.3)
CREAT SERPL-MCNC: 3.53 MG/DL — HIGH (ref 0.5–1.3)
CREAT SERPL-MCNC: 4.03 MG/DL — HIGH (ref 0.5–1.3)
CREAT SERPL-MCNC: 4.3 MG/DL — HIGH (ref 0.5–1.3)
CREAT SERPL-MCNC: 6.17 MG/DL — HIGH (ref 0.5–1.3)
GLUCOSE SERPL-MCNC: 126 MG/DL — HIGH (ref 70–99)
GLUCOSE SERPL-MCNC: 129 MG/DL — HIGH (ref 70–99)
GLUCOSE SERPL-MCNC: 153 MG/DL — HIGH (ref 70–99)
GLUCOSE SERPL-MCNC: 185 MG/DL — HIGH (ref 70–99)
GLUCOSE SERPL-MCNC: 285 MG/DL — HIGH (ref 70–99)
GLUCOSE SERPL-MCNC: 313 MG/DL — HIGH (ref 70–99)
HCT VFR BLD CALC: 24.7 % — LOW (ref 34.5–45)
HGB BLD-MCNC: 8.4 G/DL — LOW (ref 11.5–15.5)
MAGNESIUM SERPL-MCNC: 1.9 MG/DL — SIGNIFICANT CHANGE UP (ref 1.6–2.6)
MAGNESIUM SERPL-MCNC: 1.9 MG/DL — SIGNIFICANT CHANGE UP (ref 1.6–2.6)
MAGNESIUM SERPL-MCNC: 2 MG/DL — SIGNIFICANT CHANGE UP (ref 1.6–2.6)
MAGNESIUM SERPL-MCNC: 2 MG/DL — SIGNIFICANT CHANGE UP (ref 1.6–2.6)
MAGNESIUM SERPL-MCNC: 2.2 MG/DL — SIGNIFICANT CHANGE UP (ref 1.6–2.6)
MCHC RBC-ENTMCNC: 34.1 GM/DL — SIGNIFICANT CHANGE UP (ref 32–36)
MCHC RBC-ENTMCNC: 35 PG — HIGH (ref 27–34)
MCV RBC AUTO: 103 FL — HIGH (ref 80–100)
PHOSPHATE SERPL-MCNC: 1.9 MG/DL — LOW (ref 2.5–4.5)
PHOSPHATE SERPL-MCNC: 2.9 MG/DL — SIGNIFICANT CHANGE UP (ref 2.5–4.5)
PHOSPHATE SERPL-MCNC: 3.1 MG/DL — SIGNIFICANT CHANGE UP (ref 2.5–4.5)
PHOSPHATE SERPL-MCNC: 3.2 MG/DL — SIGNIFICANT CHANGE UP (ref 2.5–4.5)
PHOSPHATE SERPL-MCNC: 3.4 MG/DL — SIGNIFICANT CHANGE UP (ref 2.5–4.5)
PLATELET # BLD AUTO: 204 K/UL — SIGNIFICANT CHANGE UP (ref 150–400)
POTASSIUM SERPL-MCNC: 3.1 MMOL/L — LOW (ref 3.5–5.3)
POTASSIUM SERPL-MCNC: 4.1 MMOL/L — SIGNIFICANT CHANGE UP (ref 3.5–5.3)
POTASSIUM SERPL-MCNC: 4.8 MMOL/L — SIGNIFICANT CHANGE UP (ref 3.5–5.3)
POTASSIUM SERPL-MCNC: 4.8 MMOL/L — SIGNIFICANT CHANGE UP (ref 3.5–5.3)
POTASSIUM SERPL-MCNC: 5 MMOL/L — SIGNIFICANT CHANGE UP (ref 3.5–5.3)
POTASSIUM SERPL-MCNC: 5.8 MMOL/L — HIGH (ref 3.5–5.3)
POTASSIUM SERPL-SCNC: 3.1 MMOL/L — LOW (ref 3.5–5.3)
POTASSIUM SERPL-SCNC: 4.1 MMOL/L — SIGNIFICANT CHANGE UP (ref 3.5–5.3)
POTASSIUM SERPL-SCNC: 4.8 MMOL/L — SIGNIFICANT CHANGE UP (ref 3.5–5.3)
POTASSIUM SERPL-SCNC: 4.8 MMOL/L — SIGNIFICANT CHANGE UP (ref 3.5–5.3)
POTASSIUM SERPL-SCNC: 5 MMOL/L — SIGNIFICANT CHANGE UP (ref 3.5–5.3)
POTASSIUM SERPL-SCNC: 5.8 MMOL/L — HIGH (ref 3.5–5.3)
RBC # BLD: 2.4 M/UL — LOW (ref 3.8–5.2)
RBC # FLD: 12.4 % — SIGNIFICANT CHANGE UP (ref 10.3–14.5)
SODIUM SERPL-SCNC: 133 MMOL/L — LOW (ref 135–145)
SODIUM SERPL-SCNC: 135 MMOL/L — SIGNIFICANT CHANGE UP (ref 135–145)
SODIUM SERPL-SCNC: 136 MMOL/L — SIGNIFICANT CHANGE UP (ref 135–145)
SODIUM SERPL-SCNC: 140 MMOL/L — SIGNIFICANT CHANGE UP (ref 135–145)
WBC # BLD: 4 K/UL — SIGNIFICANT CHANGE UP (ref 3.8–10.5)
WBC # FLD AUTO: 4 K/UL — SIGNIFICANT CHANGE UP (ref 3.8–10.5)

## 2017-07-27 PROCEDURE — 99233 SBSQ HOSP IP/OBS HIGH 50: CPT | Mod: GC

## 2017-07-27 PROCEDURE — 99233 SBSQ HOSP IP/OBS HIGH 50: CPT

## 2017-07-27 RX ORDER — DEXTROSE 10 % IN WATER 10 %
1000 INTRAVENOUS SOLUTION INTRAVENOUS
Qty: 0 | Refills: 0 | Status: DISCONTINUED | OUTPATIENT
Start: 2017-07-27 | End: 2017-07-27

## 2017-07-27 RX ORDER — POTASSIUM CHLORIDE 20 MEQ
10 PACKET (EA) ORAL
Qty: 0 | Refills: 0 | Status: DISCONTINUED | OUTPATIENT
Start: 2017-07-27 | End: 2017-07-27

## 2017-07-27 RX ORDER — HEPARIN SODIUM 5000 [USP'U]/ML
5000 INJECTION INTRAVENOUS; SUBCUTANEOUS EVERY 8 HOURS
Qty: 0 | Refills: 0 | Status: DISCONTINUED | OUTPATIENT
Start: 2017-07-27 | End: 2017-08-03

## 2017-07-27 RX ORDER — HEPARIN SODIUM 5000 [USP'U]/ML
5000 INJECTION INTRAVENOUS; SUBCUTANEOUS EVERY 12 HOURS
Qty: 0 | Refills: 0 | Status: DISCONTINUED | OUTPATIENT
Start: 2017-07-27 | End: 2017-07-27

## 2017-07-27 RX ORDER — MAGNESIUM SULFATE 500 MG/ML
1 VIAL (ML) INJECTION ONCE
Qty: 0 | Refills: 0 | Status: DISCONTINUED | OUTPATIENT
Start: 2017-07-27 | End: 2017-07-27

## 2017-07-27 RX ORDER — POTASSIUM PHOSPHATE, MONOBASIC POTASSIUM PHOSPHATE, DIBASIC 236; 224 MG/ML; MG/ML
15 INJECTION, SOLUTION INTRAVENOUS ONCE
Qty: 0 | Refills: 0 | Status: DISCONTINUED | OUTPATIENT
Start: 2017-07-27 | End: 2017-07-27

## 2017-07-27 RX ADMIN — CINACALCET 60 MILLIGRAM(S): 30 TABLET, FILM COATED ORAL at 11:54

## 2017-07-27 RX ADMIN — SEVELAMER CARBONATE 2400 MILLIGRAM(S): 2400 POWDER, FOR SUSPENSION ORAL at 11:51

## 2017-07-27 RX ADMIN — Medication 100 MILLIEQUIVALENT(S): at 06:18

## 2017-07-27 RX ADMIN — SENNA PLUS 2 TABLET(S): 8.6 TABLET ORAL at 21:13

## 2017-07-27 RX ADMIN — Medication 100 MILLIGRAM(S): at 14:33

## 2017-07-27 RX ADMIN — Medication 100 MILLIGRAM(S): at 21:13

## 2017-07-27 RX ADMIN — CLOPIDOGREL BISULFATE 75 MILLIGRAM(S): 75 TABLET, FILM COATED ORAL at 11:53

## 2017-07-27 RX ADMIN — SEVELAMER CARBONATE 2400 MILLIGRAM(S): 2400 POWDER, FOR SUSPENSION ORAL at 19:33

## 2017-07-27 RX ADMIN — ERYTHROPOIETIN 10000 UNIT(S): 10000 INJECTION, SOLUTION INTRAVENOUS; SUBCUTANEOUS at 00:30

## 2017-07-27 RX ADMIN — Medication 100 MILLIGRAM(S): at 06:18

## 2017-07-27 RX ADMIN — Medication 75 MILLILITER(S): at 02:41

## 2017-07-27 RX ADMIN — SODIUM CHLORIDE 75 MILLILITER(S): 9 INJECTION, SOLUTION INTRAVENOUS at 00:10

## 2017-07-27 RX ADMIN — Medication 81 MILLIGRAM(S): at 11:53

## 2017-07-27 RX ADMIN — OXYCODONE AND ACETAMINOPHEN 1 TABLET(S): 5; 325 TABLET ORAL at 14:33

## 2017-07-27 RX ADMIN — ATORVASTATIN CALCIUM 20 MILLIGRAM(S): 80 TABLET, FILM COATED ORAL at 21:14

## 2017-07-27 RX ADMIN — Medication 100 MILLIGRAM(S): at 14:34

## 2017-07-27 RX ADMIN — Medication 100 MILLIEQUIVALENT(S): at 08:54

## 2017-07-27 RX ADMIN — DULOXETINE HYDROCHLORIDE 60 MILLIGRAM(S): 30 CAPSULE, DELAYED RELEASE ORAL at 11:53

## 2017-07-27 NOTE — PROGRESS NOTE ADULT - PROBLEM SELECTOR PROBLEM 4
End stage renal disease
End stage renal disease
Diabetic ketoacidosis without coma associated with type 1 diabetes mellitus

## 2017-07-27 NOTE — PROGRESS NOTE ADULT - ASSESSMENT
Pt is a 62F PMH DMI (on insulin pump), CAD ( s/p stents), ESRD on HD (T/Th/Sa), CVA with memory defecit p/w CP and high sugars seen at home    #Neuro - Pt AOx3, non-focal exam  # CV - No CP, off pressors. Blood pressure labile, systolic ranging 96 - 171. Will monitor. History HFpEF (EF 65% on ECHO 7/24), asymptomatic, will maintain BP control.    #Pulm- No active issues, patient satting 100% on RA    #G I- No N/V. Currently NPO while on D10 and insulin for DKA.     #Endo- Patient on insulin ggt .5u/hr. IV D10% being administered, titrated rate according to hourly finger sticks. Per BMP at 4 am, Gap closed at 14. Will need to discuss plan with endocrine regarding transferring to pump vs basal bolus.    # Renal - Patient on HD, last session yesterday, currently stable. Anuric.    # DVT PPX- Pt is a 62F PMH DMI (on insulin pump), CAD ( s/p stents), ESRD on HD (T/Th/Sa), CVA with memory defecit p/w CP and high sugars seen at home    #Neuro - Pt AOx3, non-focal exam    # CV - No CP, off pressors. Blood pressure labile, systolic ranging 96 - 171. Will monitor. History HFpEF (EF 65% on ECHO 7/24), asymptomatic, will maintain BP control.    #Pulm- No active issues, patient satting 100% on RA    #G I- No N/V. Currently NPO while on D10 and insulin for DKA.     #Endo- Patient on insulin ggt .5u/hr. IV D10% being administered, titrated rate according to hourly finger sticks. Per BMP at 4 am, Gap closed at 14. Will need to discuss plan with endocrine regarding transferring to pump vs basal bolus. D10 lowered from 75 mL/hr to 50 mL/hr at 7 am due to rising FS    # Renal - Patient on HD, last session yesterday, currently stable. Anuric.    # DVT PPX- Started on SQ Hep

## 2017-07-27 NOTE — DIETITIAN INITIAL EVALUATION ADULT. - ENERGY NEEDS
Ht: 60"   Wt: 135  BMI: 26.1 kg/m2   IBW:100  (+/-10%)     135% IBW  Edema:1+ foot/ankle    Skin: no pressure injuries

## 2017-07-27 NOTE — PROGRESS NOTE ADULT - ASSESSMENT
ESRD  HD  3.5 hr 2.4 liter 2 k bath bfr 400cc/min revaclear 300    DM uncontrolled. insulin drip change to nph

## 2017-07-27 NOTE — PROGRESS NOTE ADULT - ASSESSMENT
62 F CAD, s/p PCI, s/p brachy RCA, d-CHF, asd repair, ESRD admitted with chest pain, hyperglycemia, admitted to MICU  for DKA

## 2017-07-27 NOTE — PROGRESS NOTE ADULT - SUBJECTIVE AND OBJECTIVE BOX
Patient is a 62y old  Female who presents with a chief complaint of     SUBJECTIVE / OVERNIGHT EVENTS: Comfortable without new complaints.   Review of Systems  chest pain no  palpitations no  sob no  nausea no  headache no    MEDICATIONS  (STANDING):  DULoxetine 60 milliGRAM(s) Oral daily  atorvastatin 20 milliGRAM(s) Oral at bedtime  clopidogrel Tablet 75 milliGRAM(s) Oral daily  metoprolol succinate ER 50 milliGRAM(s) Oral daily  aspirin enteric coated 81 milliGRAM(s) Oral daily  cinacalcet 60 milliGRAM(s) Oral daily  hydrALAZINE 100 milliGRAM(s) Oral every 8 hours  dextrose 50% Injectable 50 milliLiter(s) IV Push every 15 minutes  dextrose 50% Injectable 25 milliLiter(s) IV Push every 15 minutes  senna 2 Tablet(s) Oral at bedtime  docusate sodium 100 milliGRAM(s) Oral three times a day  dextrose 5%. 1000 milliLiter(s) (50 mL/Hr) IV Continuous <Continuous>  sevelamer hydrochloride 2400 milliGRAM(s) Oral three times a day with meals  insulin Infusion 0.5 Unit(s)/Hr (0.5 mL/Hr) IV Continuous <Continuous>  dextrose 10%. 1000 milliLiter(s) (30 mL/Hr) IV Continuous <Continuous>  heparin  Injectable 5000 Unit(s) SubCutaneous every 8 hours    MEDICATIONS  (PRN):  oxyCODONE    5 mG/acetaminophen 325 mG 1 Tablet(s) Oral every 6 hours PRN Severe Pain (7 - 10)  acetaminophen   Tablet. 650 milliGRAM(s) Oral every 6 hours PRN Mild and/or moderate Pain  ondansetron Injectable 4 milliGRAM(s) IV Push every 6 hours PRN Nausea and/or Vomiting  dextrose Gel 1 Dose(s) Oral once PRN Blood Glucose LESS THAN 70 milliGRAM(s)/deciLiter  glucagon  Injectable 1 milliGRAM(s) IntraMuscular once PRN Glucose <70 milliGRAM(s)/deciLiter          PHYSICAL EXAM:  GENERAL: NAD, well-developed  HEAD:  Atraumatic, Normocephalic  EYES: EOMI, PERRLA, conjunctiva and sclera clear  NECK: Supple, No JVD  CHEST/LUNG: Clear to auscultation bilaterally; No wheeze  HEART: Regular rate and rhythm; No murmurs, rubs, or gallops  ABDOMEN: Soft, Nontender, Nondistended; Bowel sounds present  EXTREMITIES:  2+ Peripheral Pulses, No clubbing, cyanosis, or edema  PSYCH: AAOx3  NEUROLOGY: non-focal  SKIN: No rashes or lesions    LABS:                        8.4    4.0   )-----------( 204      ( 27 Jul 2017 03:58 )             24.7     07-27    136  |  96  |  10  ----------------------------<  185<H>  4.8   |  30  |  3.10<H>    Ca    7.8<L>      27 Jul 2017 08:16  Phos  3.1     07-27  Mg     2.0     07-27    TPro  6.7  /  Alb  3.9  /  TBili  0.2  /  DBili  x   /  AST  26  /  ALT  13  /  AlkPhos  232<H>  07-26    PT/INR - ( 26 Jul 2017 14:23 )   PT: 10.7 sec;   INR: 0.98 ratio         PTT - ( 27 Jul 2017 03:58 )  PTT:28.9 sec  CARDIAC MARKERS ( 26 Jul 2017 14:23 )  x     / 1.38 ng/mL / 180 U/L / x     / 5.2 ng/mL          RADIOLOGY & ADDITIONAL TESTS:    Imaging Personally Reviewed:    Consultant(s) Notes Reviewed:      Care Discussed with Consultants/Other Providers:

## 2017-07-27 NOTE — DIETITIAN INITIAL EVALUATION ADULT. - FACTORS AFF FOOD INTAKE
usually wears dentures but doesn't have them with her, able to tolerate regular consistency foods/none

## 2017-07-27 NOTE — DIETITIAN INITIAL EVALUATION ADULT. - NS FNS REASON FOR WEIGHT CHANG
per significant other, pt has lost wt but unsure of amount. Per previous RD notes wt has been anywhere from 121-141 in hospital w/ usual wt reported at 131 lb. Pt unsure of usual dry wt

## 2017-07-27 NOTE — PROGRESS NOTE ADULT - SUBJECTIVE AND OBJECTIVE BOX
Interval events:     Review of Systems:  Constitutional: no fever, chills, fatigue  Neuro: no headache, numbness, weakness  Resp: no cough, wheezing, shortness of breath  CVS: no chest pain, palpitations, leg swelling  GI: no abdominal pain, nausea, vomiting, diarrhea   : no dysuria, frequency, incontinence  Skin: no itching, burning, rashes, or lesions   Msk: no joint pain or swelling  Psych: no depression, anxiety    T(F): 98.1 (07-27-17 @ 04:00), Max: 98.4 (07-27-17 @ 00:00)  HR: 73 (07-27-17 @ 07:00) (61 - 86)  BP: 126/53 (07-27-17 @ 07:00) (96/50 - 171/83)  RR: 14 (07-27-17 @ 07:00) (13 - 27)  SpO2: 100% (07-27-17 @ 07:00) (97% - 100%)  Wt(kg): --        CAPILLARY BLOOD GLUCOSE  184 (27 Jul 2017 07:00)        I&O's Summary    26 Jul 2017 07:01  -  27 Jul 2017 07:00  --------------------------------------------------------  IN: 2073 mL / OUT: 3400 mL / NET: -1327 mL        Physical Exam:     Gen:  Neuro:  HEENT:  CV:  Pulm:  GI:  Ext:  Skin:    Meds:  clopidogrel Tablet 75 milliGRAM(s) Oral daily  aspirin enteric coated 81 milliGRAM(s) Oral daily      metoprolol succinate ER 50 milliGRAM(s) Oral daily  hydrALAZINE 100 milliGRAM(s) Oral every 8 hours    atorvastatin 20 milliGRAM(s) Oral at bedtime  dextrose 50% Injectable 50 milliLiter(s) IV Push every 15 minutes  dextrose 50% Injectable 25 milliLiter(s) IV Push every 15 minutes  dextrose Gel 1 Dose(s) Oral once PRN  glucagon  Injectable 1 milliGRAM(s) IntraMuscular once PRN  insulin Infusion 0.5 Unit(s)/Hr IV Continuous <Continuous>      DULoxetine 60 milliGRAM(s) Oral daily  oxyCODONE    5 mG/acetaminophen 325 mG 1 Tablet(s) Oral every 6 hours PRN  acetaminophen   Tablet. 650 milliGRAM(s) Oral every 6 hours PRN  ondansetron Injectable 4 milliGRAM(s) IV Push every 6 hours PRN    senna 2 Tablet(s) Oral at bedtime  docusate sodium 100 milliGRAM(s) Oral three times a day        dextrose 5%. 1000 milliLiter(s) IV Continuous <Continuous>  dextrose 10%. 1000 milliLiter(s) IV Continuous <Continuous>  potassium chloride  10 mEq/100 mL IVPB 10 milliEquivalent(s) IV Intermittent every 1 hour        cinacalcet 60 milliGRAM(s) Oral daily  sevelamer hydrochloride 2400 milliGRAM(s) Oral three times a day with meals                                8.4    4.0   )-----------( 204      ( 27 Jul 2017 03:58 )             24.7       07-27    140  |  98  |  7   ----------------------------<  126<H>  3.1<L>   |  28  |  2.02<H>    Ca    8.3<L>      27 Jul 2017 03:58  Phos  1.9     07-27  Mg     1.9     07-27    TPro  6.7  /  Alb  3.9  /  TBili  0.2  /  DBili  x   /  AST  26  /  ALT  13  /  AlkPhos  232<H>  07-26      CARDIAC MARKERS ( 26 Jul 2017 14:23 )  x     / 1.38 ng/mL / 180 U/L / x     / 5.2 ng/mL      PT/INR - ( 26 Jul 2017 14:23 )   PT: 10.7 sec;   INR: 0.98 ratio         PTT - ( 27 Jul 2017 03:58 )  PTT:28.9 sec Interval events: Patient admitted to MICU. Had FS 25 yesterday, placed on D5, FS went up to 300's and gap opened up. Placed on insulin drip and D10.    Review of Systems:  Constitutional: no fever, chills  Neuro: no headache, confusion  Resp: no cough, shortness of breath  CVS: no CP, palpitations  GI: no abdominal pain,N/V, diarrhea.   : no urine production      T(F): 98.1 (07-27-17 @ 04:00), Max: 98.4 (07-27-17 @ 00:00)  HR: 73 (07-27-17 @ 07:00) (61 - 86)  BP: 126/53 (07-27-17 @ 07:00) (96/50 - 171/83)  RR: 14 (07-27-17 @ 07:00) (13 - 27)  SpO2: 100% (07-27-17 @ 07:00) (97% - 100%)  Wt(kg): --        CAPILLARY BLOOD GLUCOSE  184 (27 Jul 2017 07:00)        I&O's Summary    26 Jul 2017 07:01  -  27 Jul 2017 07:00  --------------------------------------------------------  IN: 2073 mL / OUT: 3400 mL / NET: -1327 mL        Physical Exam:     Gen: NAD, A&O x 3  Neuro: Alert, no focal deficits  HEENT: EOMI, PERRL  CV: RRR, no murmur  Pulm: CTAB  GI: Nontender, non distended  Ext: No LE edema  Skin: No rash    Meds:  clopidogrel Tablet 75 milliGRAM(s) Oral daily  aspirin enteric coated 81 milliGRAM(s) Oral daily      metoprolol succinate ER 50 milliGRAM(s) Oral daily  hydrALAZINE 100 milliGRAM(s) Oral every 8 hours    atorvastatin 20 milliGRAM(s) Oral at bedtime  dextrose 50% Injectable 50 milliLiter(s) IV Push every 15 minutes  dextrose 50% Injectable 25 milliLiter(s) IV Push every 15 minutes  dextrose Gel 1 Dose(s) Oral once PRN  glucagon  Injectable 1 milliGRAM(s) IntraMuscular once PRN  insulin Infusion 0.5 Unit(s)/Hr IV Continuous <Continuous>      DULoxetine 60 milliGRAM(s) Oral daily  oxyCODONE    5 mG/acetaminophen 325 mG 1 Tablet(s) Oral every 6 hours PRN  acetaminophen   Tablet. 650 milliGRAM(s) Oral every 6 hours PRN  ondansetron Injectable 4 milliGRAM(s) IV Push every 6 hours PRN    senna 2 Tablet(s) Oral at bedtime  docusate sodium 100 milliGRAM(s) Oral three times a day        dextrose 5%. 1000 milliLiter(s) IV Continuous <Continuous>  dextrose 10%. 1000 milliLiter(s) IV Continuous <Continuous>  potassium chloride  10 mEq/100 mL IVPB 10 milliEquivalent(s) IV Intermittent every 1 hour        cinacalcet 60 milliGRAM(s) Oral daily  sevelamer hydrochloride 2400 milliGRAM(s) Oral three times a day with meals                                8.4    4.0   )-----------( 204      ( 27 Jul 2017 03:58 )             24.7       07-27    140  |  98  |  7   ----------------------------<  126<H>  3.1<L>   |  28  |  2.02<H>    Ca    8.3<L>      27 Jul 2017 03:58  Phos  1.9     07-27  Mg     1.9     07-27    TPro  6.7  /  Alb  3.9  /  TBili  0.2  /  DBili  x   /  AST  26  /  ALT  13  /  AlkPhos  232<H>  07-26      CARDIAC MARKERS ( 26 Jul 2017 14:23 )  x     / 1.38 ng/mL / 180 U/L / x     / 5.2 ng/mL      PT/INR - ( 26 Jul 2017 14:23 )   PT: 10.7 sec;   INR: 0.98 ratio         PTT - ( 27 Jul 2017 03:58 )  PTT:28.9 sec

## 2017-07-27 NOTE — PROGRESS NOTE ADULT - SUBJECTIVE AND OBJECTIVE BOX
Chief Complaint: type 1 diabetes, DKA, insulin pump use    Interval history: Yesterday, pt had abd pain, n/v, fluctuating BG, BOHB checked and was high.  pt later dialyzed overnight, then had insulin gtt started at 0.5 unit/hr until gap closed.  Pump currently off.  Pt received breakfast this AM and very hungry.  Currently in MICU given her need for gtt o/n.     MEDICATIONS  (STANDING):  DULoxetine 60 milliGRAM(s) Oral daily  atorvastatin 20 milliGRAM(s) Oral at bedtime  clopidogrel Tablet 75 milliGRAM(s) Oral daily  metoprolol succinate ER 50 milliGRAM(s) Oral daily  aspirin enteric coated 81 milliGRAM(s) Oral daily  cinacalcet 60 milliGRAM(s) Oral daily  hydrALAZINE 100 milliGRAM(s) Oral every 8 hours  dextrose 50% Injectable 50 milliLiter(s) IV Push every 15 minutes  dextrose 50% Injectable 25 milliLiter(s) IV Push every 15 minutes  senna 2 Tablet(s) Oral at bedtime  docusate sodium 100 milliGRAM(s) Oral three times a day  dextrose 5%. 1000 milliLiter(s) (50 mL/Hr) IV Continuous <Continuous>  sevelamer hydrochloride 2400 milliGRAM(s) Oral three times a day with meals  insulin Infusion 0.5 Unit(s)/Hr (0.5 mL/Hr) IV Continuous <Continuous>  dextrose 10%. 1000 milliLiter(s) (30 mL/Hr) IV Continuous <Continuous>  heparin  Injectable 5000 Unit(s) SubCutaneous every 8 hours    MEDICATIONS  (PRN):  oxyCODONE    5 mG/acetaminophen 325 mG 1 Tablet(s) Oral every 6 hours PRN Severe Pain (7 - 10)  acetaminophen   Tablet. 650 milliGRAM(s) Oral every 6 hours PRN Mild and/or moderate Pain  ondansetron Injectable 4 milliGRAM(s) IV Push every 6 hours PRN Nausea and/or Vomiting  dextrose Gel 1 Dose(s) Oral once PRN Blood Glucose LESS THAN 70 milliGRAM(s)/deciLiter  glucagon  Injectable 1 milliGRAM(s) IntraMuscular once PRN Glucose <70 milliGRAM(s)/deciLiter      Allergies    No Known Allergies    Intolerances      Review of Systems:    GI: No nausea, vomiting, abdominal pain today   Skin: no rash  Endocrine: no polyuria, polydipsia (on HD)    ALL OTHER SYSTEMS REVIEWED AND NEGATIVE        PHYSICAL EXAM:  VITALS: T(C): 36.9 (07-27-17 @ 08:00)  T(F): 98.5 (07-27-17 @ 08:00), Max: 98.5 (07-27-17 @ 08:00)  HR: 70 (07-27-17 @ 12:00) (61 - 86)  BP: 145/61 (07-27-17 @ 12:00) (96/50 - 157/69)  RR:  (11 - 27)  SpO2:  (97% - 100%)  Wt(kg): --  GENERAL: NAD, chronically ill appearing   EYES: No proptosis, no lid lag, anicteric  SKIN: Dry, intact, No rashes or lesions  PSYCH: Alert and oriented x 3, normal affect, normal mood    CAPILLARY BLOOD GLUCOSE  295 (07-27 @ 12:00)  231 (07-27 @ 11:00)  230 (07-27 @ 10:00)  180 (07-27 @ 09:00)  181 (07-27 @ 08:00)  184 (07-27 @ 07:00)  154 (07-27 @ 06:00)  144 (07-27 @ 05:08)  116 (07-27 @ 04:00)  120 (07-27 @ 03:00)  106 (07-27 @ 02:00)  120 (07-27 @ 01:00)  142 (07-27 @ 00:00)  176 (07-26 @ 23:12)  202 (07-26 @ 22:00)  230 (07-26 @ 21:00)  244 (07-26 @ 20:00)  279 (07-26 @ 18:01)  296 (07-26 @ 16:03)  301 (07-26 @ 15:00)  131 (07-26 @ 12:19)  160 (07-26 @ 11:30)  53 (07-26 @ 10:49)  62 (07-26 @ 10:34)  60 (07-26 @ 10:33)  56 (07-26 @ 10:32)  57 (07-26 @ 10:29)  76 (07-26 @ 10:28)  25 (07-26 @ 10:27)  209 (07-26 @ 06:07)  107 (07-26 @ 02:32)  68 (07-26 @ 02:17)  49 (07-26 @ 02:02)  49 (07-26 @ 02:00)  126 (07-25 @ 21:26)  206 (07-25 @ 19:00)  146 (07-25 @ 18:00)  99 (07-25 @ 17:00)  122 (07-25 @ 16:00)  166 (07-25 @ 15:00)  233 (07-25 @ 14:00)  238 (07-25 @ 13:00)  262 (07-25 @ 12:00)  258 (07-25 @ 11:00)  252 (07-25 @ 10:00)  179 (07-25 @ 09:00)  123 (07-25 @ 08:00)  142 (07-25 @ 07:00)  140 (07-25 @ 06:00)  165 (07-25 @ 05:00)  174 (07-25 @ 04:00)  93 (07-25 @ 03:00)  98 (07-25 @ 02:00)  115 (07-25 @ 01:00)  148 (07-25 @ 00:00)  176 (07-24 @ 23:00)  129 (07-24 @ 22:00)  119 (07-24 @ 21:00)  107 (07-24 @ 20:00)  109 (07-24 @ 19:00)  119 (07-24 @ 18:00)  133 (07-24 @ 17:00)  164 (07-24 @ 16:00)  214 (07-24 @ 15:00)  261 (07-24 @ 14:00)  228 (07-24 @ 13:00)      07-27    136  |  96  |  10  ----------------------------<  185<H>  4.8   |  30  |  3.10<H>    EGFR if : 18<L>  EGFR if non : 16<L>    Ca    7.8<L>      07-27  Mg     2.0     07-27  Phos  3.1     07-27    TPro  6.7  /  Alb  3.9  /  TBili  0.2  /  DBili  x   /  AST  26  /  ALT  13  /  AlkPhos  232<H>  07-26          Thyroid Function Tests:      Hemoglobin A1C, Whole Blood: 6.8 % <H> [4.0 - 5.6] (07-24-17 @ 06:15)  Hemoglobin A1C, Whole Blood: 6.8 % <H> [4.0 - 5.6] (07-23-17 @ 22:45)  Hemoglobin A1C, Whole Blood: 7.3 % <H> [4.0 - 5.6] (06-16-17 @ 04:45)

## 2017-07-27 NOTE — DIETITIAN INITIAL EVALUATION ADULT. - ADHERENCE
Pt states that she counts carbs and adjusts amount of insulin dose based on what she is going to eat, per significant other, he stated that she will bolus  insulin but will keep on eating. Stated that she was told that she couldn't eat any vegetables, reviewed list of low, moderate, high potassium vegetables and provided written copy of hemodialysis diet. States that she checks her BG at home w/ range 0f 100-200.

## 2017-07-27 NOTE — PROGRESS NOTE ADULT - ASSESSMENT
61 yo female with hx of T1DM controlled with pvd s/p fem-pop b/l, retinopathy, CAD, ESRD on HD, and neuropathy here with DKA and NSTEMI. Had mild DKA on 7/26, received HD then insulin gtt, then gap closed on morning of 7/27 and pump reattached.  BG very labile.

## 2017-07-27 NOTE — PROGRESS NOTE ADULT - ASSESSMENT
62 f with DKA / diabetes Mellitus type 1  BS control/ endocrine follow. DKA resolvig- restart insulin pump/ bridge with Insulin gtt.  s/p NSTEMI-AC, asa/Plavix. Cardiology follow, pain control  ESRD- continue HD  Peripheral Vascular disease- conservative management.  ICU care  Pierce Viera MD pager [phone]

## 2017-07-27 NOTE — PROGRESS NOTE ADULT - PROBLEM SELECTOR PLAN 1
Pump settings were empirically reduced yesterday after pt had several severe low BG to <40 without sx.  Thereafter, had DKA and was on isnulin gtt.  Now is feeling better, no abd pain or n/v and tolerating po.  As gap has closed, will place pump back on (at more conservative doses that I programmed yesterday) and will overlap with insulin gtt for 1 hour, then nurse will stop gtt but continue to check BG for 2 hours to make sure stable.

## 2017-07-27 NOTE — PROGRESS NOTE ADULT - SUBJECTIVE AND OBJECTIVE BOX
[facility]   [phone]  RENAL FOLLOW UP NOTE  --------------------------------------------------------------------------------  Chief Complaint:    24 hour events/subjective:  in icu. c/o lethargy overall       PAST HISTORY  --------------------------------------------------------------------------------  No significant changes to PMH, PSH, FHx, SHx, unless otherwise noted    ALLERGIES & MEDICATIONS  --------------------------------------------------------------------------------  Allergies    No Known Allergies    Intolerances      Standing Inpatient Medications  DULoxetine 60 milliGRAM(s) Oral daily  atorvastatin 20 milliGRAM(s) Oral at bedtime  clopidogrel Tablet 75 milliGRAM(s) Oral daily  metoprolol succinate ER 50 milliGRAM(s) Oral daily  aspirin enteric coated 81 milliGRAM(s) Oral daily  cinacalcet 60 milliGRAM(s) Oral daily  hydrALAZINE 100 milliGRAM(s) Oral every 8 hours  dextrose 50% Injectable 50 milliLiter(s) IV Push every 15 minutes  dextrose 50% Injectable 25 milliLiter(s) IV Push every 15 minutes  senna 2 Tablet(s) Oral at bedtime  docusate sodium 100 milliGRAM(s) Oral three times a day  dextrose 5%. 1000 milliLiter(s) IV Continuous <Continuous>  sevelamer hydrochloride 2400 milliGRAM(s) Oral three times a day with meals  insulin Infusion 0.5 Unit(s)/Hr IV Continuous <Continuous>  dextrose 10%. 1000 milliLiter(s) IV Continuous <Continuous>  heparin  Injectable 5000 Unit(s) SubCutaneous every 8 hours    PRN Inpatient Medications  oxyCODONE    5 mG/acetaminophen 325 mG 1 Tablet(s) Oral every 6 hours PRN  acetaminophen   Tablet. 650 milliGRAM(s) Oral every 6 hours PRN  ondansetron Injectable 4 milliGRAM(s) IV Push every 6 hours PRN  dextrose Gel 1 Dose(s) Oral once PRN  glucagon  Injectable 1 milliGRAM(s) IntraMuscular once PRN      REVIEW OF SYSTEMS  --------------------------------------------------------------------------------    Gen: + fatigue,  - fevers/chills,  CVS: denies chest pain/palpitations  Resp: denies SOB/Cough  GI: Denies N/V/Abd pain    All other systems were reviewed and are negative, except as noted.    VITALS/PHYSICAL EXAM  --------------------------------------------------------------------------------  T(C): 36.9 (07-27-17 @ 08:00), Max: 36.9 (07-27-17 @ 00:00)  HR: 67 (07-27-17 @ 10:00) (61 - 86)  BP: 142/64 (07-27-17 @ 10:00) (96/50 - 171/83)  RR: 15 (07-27-17 @ 10:00) (13 - 27)  SpO2: 100% (07-27-17 @ 10:00) (97% - 100%)  Wt(kg): --        07-26-17 @ 07:01  -  07-27-17 @ 07:00  --------------------------------------------------------  IN: 2073 mL / OUT: 3400 mL / NET: -1327 mL    07-27-17 @ 07:01  -  07-27-17 @ 10:28  --------------------------------------------------------  IN: 201 mL / OUT: 0 mL / NET: 201 mL      Physical Exam:  	    Physical Exam:  	Gen: alert oriented place person and date   	Pulm: Decreased breath sounds b/l bases. no rales or ronchi or wheezing  	CV: RRR, S1/S2. no rub  	Back: No CVA tenderness; no sacral edema  	Abd: +BS, soft, nontender/nondistended  	: No suprapubic tenderness.               Extremity: No cyanosis, LLE 1+ edema no clubbing  	  LABS/STUDIES  --------------------------------------------------------------------------------              8.4    4.0   >-----------<  204      [07-27-17 @ 03:58]              24.7     136  |  96  |  10  ----------------------------<  185      [07-27-17 @ 08:16]  4.8   |  30  |  3.10        Ca     7.8     [07-27-17 @ 08:16]      Mg     2.0     [07-27-17 @ 08:16]      Phos  3.1     [07-27-17 @ 08:16]    TPro  6.7  /  Alb  3.9  /  TBili  0.2  /  DBili  x   /  AST  26  /  ALT  13  /  AlkPhos  232  [07-26-17 @ 14:23]    PT/INR: PT 10.7 , INR 0.98       [07-26-17 @ 14:23]  PTT: 28.9       [07-27-17 @ 03:58]    Troponin 1.38      [07-26-17 @ 14:23]        [07-26-17 @ 14:23]    Creatinine Trend:  SCr 3.10 [07-27 @ 08:16]  SCr 2.02 [07-27 @ 03:58]  SCr 6.17 [07-26 @ 22:30]  SCr 6.42 [07-26 @ 21:22]  SCr 5.95 [07-26 @ 14:23]                  HbA1c 6.8      [07-24-17 @ 06:15]

## 2017-07-27 NOTE — DIETITIAN INITIAL EVALUATION ADULT. - OTHER INFO
Pt seen for: length of stay.   Adm dx:  DKA, insulin drip had been D/C and pt went back on her pump, readmitted to MICU 7/26 for hyperglycemia and insulin drip restarted. Had HD today.   GI issues: denies N/V/D  Last BM: 2 days ago    Food allergies: NKFA   Vit/supplement PTA: mvi, renvela

## 2017-07-27 NOTE — PROGRESS NOTE ADULT - SUBJECTIVE AND OBJECTIVE BOX
CARDIOLOGY FOLLOW UP NOTE - DR. PORTILLO (for Dr. Vela)_    Subjective:    no cp./sob    events noted        PHYSICAL EXAM:  T(C): 36.9 (07-27-17 @ 08:00), Max: 36.9 (07-27-17 @ 00:00)  HR: 70 (07-27-17 @ 13:00) (61 - 86)  BP: 133/61 (07-27-17 @ 13:00) (96/50 - 155/75)  RR: 15 (07-27-17 @ 13:00) (11 - 27)  SpO2: 100% (07-27-17 @ 13:00) (99% - 100%)  Wt(kg): --  I&O's Summary    26 Jul 2017 07:01  -  27 Jul 2017 07:00  --------------------------------------------------------  IN: 2073 mL / OUT: 3400 mL / NET: -1327 mL    27 Jul 2017 07:01  -  27 Jul 2017 13:47  --------------------------------------------------------  IN: 473 mL / OUT: 0 mL / NET: 473 mL        Appearance: Normal	  Cardiovascular: Normal S1 S2,RRR, No JVD, No murmurs  Respiratory: crackles b/l bases 	  Gastrointestinal:  Soft, Non-tender, + BS	  Extremities: Normal range of motion, b/l 1+ edema   Vascular: Peripheral pulses palpable 2+ bilaterally    MEDICATIONS  (STANDING):  DULoxetine 60 milliGRAM(s) Oral daily  atorvastatin 20 milliGRAM(s) Oral at bedtime  clopidogrel Tablet 75 milliGRAM(s) Oral daily  metoprolol succinate ER 50 milliGRAM(s) Oral daily  aspirin enteric coated 81 milliGRAM(s) Oral daily  cinacalcet 60 milliGRAM(s) Oral daily  hydrALAZINE 100 milliGRAM(s) Oral every 8 hours  dextrose 50% Injectable 50 milliLiter(s) IV Push every 15 minutes  dextrose 50% Injectable 25 milliLiter(s) IV Push every 15 minutes  senna 2 Tablet(s) Oral at bedtime  docusate sodium 100 milliGRAM(s) Oral three times a day  dextrose 5%. 1000 milliLiter(s) (50 mL/Hr) IV Continuous <Continuous>  sevelamer hydrochloride 2400 milliGRAM(s) Oral three times a day with meals  insulin Infusion 0.5 Unit(s)/Hr (0.5 mL/Hr) IV Continuous <Continuous>  dextrose 10%. 1000 milliLiter(s) (30 mL/Hr) IV Continuous <Continuous>  heparin  Injectable 5000 Unit(s) SubCutaneous every 8 hours      TELEMETRY: 	    ECG:  	  RADIOLOGY:   DIAGNOSTIC TESTING:  [ ] Echocardiogram:  [ ] Catheterization:  [ ] Stress Test:    OTHER: 	    LABS:	 	    CARDIAC MARKERS:                                8.4    4.0   )-----------( 204      ( 27 Jul 2017 03:58 )             24.7     07-27    136  |  96  |  10  ----------------------------<  185<H>  4.8   |  30  |  3.10<H>    Ca    7.8<L>      27 Jul 2017 08:16  Phos  3.1     07-27  Mg     2.0     07-27    TPro  6.7  /  Alb  3.9  /  TBili  0.2  /  DBili  x   /  AST  26  /  ALT  13  /  AlkPhos  232<H>  07-26    proBNP:   PT/INR - ( 26 Jul 2017 14:23 )   PT: 10.7 sec;   INR: 0.98 ratio         PTT - ( 27 Jul 2017 03:58 )  PTT:28.9 sec  Lipid Profile:   HgA1c:

## 2017-07-27 NOTE — CHART NOTE - NSCHARTNOTEFT_GEN_A_CORE
CHIEF COMPLAINT: Type 1 Diabetes Mellitus with ketoacidosis    HPI:    62F with PMH T1DM on insulin pump with multiple complications including ESRD on HD (Tu/Th/Sat), retinopathy, neuropathy, hyperparathyroid, HTN, HLD, former smoker, gout, CAD s/p PCI on ASA and Plavix, dCHF, severe PVD s/p L&R fem-pop bypass graft, CVA, depression, chronic pain on oxycodone with multiple past admissions for DKA. Presented to ED from home on 7/23/17 with chief complaint of mid-sternal chest pain, found to have blood glucose >900. She was admitted to MICU on insulin gtt for further DKA management and r/o ACS.  In the MICU, cardiac enzymes initially positive but peaked and decreasing. IV heparin initiated as per ACS protocol.  Patient already on DAPT with ASA and Plavix. Endocrinology was consulted. Patient was transitioned off Insulin GTT yesterday and onto her insulin pump when her AG closed.  She was then transferred to a medical floor for further management per Endocrinology.  While on the floor, she experienced several episodes of hypoglycemia requiring juice and dextrose as she reported taking full dose insulin via pump, but not eating well due to nausea and vomiting.  The insulin pump was then discontinued and she was started on D5W for continued hypoglycemia as patient was refusing to eat.  An RRT was called for weakness/lethargy, however, at that time her glucose was > 200 and the dextrose decreased.  Lab work was sent showing the patient was going back into DKA with small acetate and a beta hydroxy butyrate level of 1.8, glucose of 239, and an AG of 19.  Venous ph noted to be 7.35 with mild hyperkalemia of 5.4.  Patient accepted to MICU for intravenous insulin and management of DKA.    In the MICU the patient's insulin pump remained off while on insulin drip .5U/hr and on D10. In the AM, after the gap was closed, patient was started on diet and D10 was discontinued. She was seen by endocrine and the pump was re-attached at reduced settings from when she last left the MICU to prevent further episodes of hypoglycemia. Patient's FS and anion gap were monitored and transferred to the floor when stable.    PAST MEDICAL & SURGICAL HISTORY:  Subclavian vein stenosis, left: s/p stent  Cellulitis: 2015 left foot  DKA, type 1: 1/2015  ACS (acute coronary syndrome): 1/2015 - cath revealed 100% ostial stenosis not amenable to PCI - medical management  MI, old  TIA (transient ischemic attack): x 2 - 8-9 years ago prior to ASD/VSD repair  CAD (coronary artery disease): s/p stents  Murmur, cardiac  Gout: past  CVA (cerebral infarction): with no residual, 8 yrs ago, prior to heart surgery - ST memory loss  Peripheral vascular disease: occluded left fem-pop bypass 5/2015  Diabetes mellitus type 1: Insulin Dependent - [employer]  Minimed Paradigm Insulin Pump - Novolog  ESRD (end stage renal disease): dialysis , tue, thursday, saturday  Hyperlipidemia  Status post device closure of ASD: &quot; clamshell&quot;  History of cardiac catheterization: 1/2015 - no intervention  S/P femoral-popliteal bypass surgery: L and R in 2013 with graft; 5/2015 CFA angioplasty left and ileofemoral endarterectomywith vein patch angioplasty of left fem-pop bypass graft  Multiple vascular surgery both leg, left fempop bypass revision 11/2015  AV (arteriovenous fistula): Left AV graft; revision with stent placement 2-3 years ago  S/P cholecystectomy      FAMILY HISTORY:  Family history of smoking  Family history of hypertension  Family history of cancer (Sibling)    Allergies: NKA      REVIEW OF SYSTEMS:  Constitutional: No fevers or chills  HEENT: No visual changes  CV: No CP or palpitations  Resp: No cough or SOB  GI: No N/V   : Anuric  Neurological: No headache or dizziness  Endocrine: Diabetes  [x] All other systems negative    ICU Vital Signs Last 24 Hrs  T(C): 36.9 (27 Jul 2017 08:00), Max: 36.9 (27 Jul 2017 00:00)  T(F): 98.5 (27 Jul 2017 08:00), Max: 98.5 (27 Jul 2017 08:00)  HR: 72 (27 Jul 2017 16:00) (61 - 86)  BP: 122/59 (27 Jul 2017 16:00) (96/50 - 171/72)  BP(mean): 85 (27 Jul 2017 16:00) (66 - 103)  ABP: --  ABP(mean): --  RR: 15 (27 Jul 2017 16:00) (11 - 27)  SpO2: 100% (27 Jul 2017 16:00) (99% - 100%)    Physical Exam:  Neuro: Alert & oriented x 4.  Pupils equal and reactive bilaterally  Pulm: CTA bilaterally. Unlabored, no respiratory distress  CV: S1, S2. No murmur  GI: Soft, nontender, BS+ x4  Peripheral Vascular: +2 radial pulses bilaterally, +1 pedal pulses bilaterally. AVF left upper extremity CHIEF COMPLAINT: Type 1 Diabetes Mellitus with ketoacidosis    HPI:    62F with PMH T1DM on insulin pump with multiple complications including ESRD on HD (Tu/Th/Sat), retinopathy, neuropathy, hyperparathyroid, HTN, HLD, former smoker, gout, CAD s/p PCI on ASA and Plavix, dCHF, severe PVD s/p L&R fem-pop bypass graft, CVA, depression, chronic pain on oxycodone with multiple past admissions for DKA. Presented to ED from home on 7/23/17 with chief complaint of mid-sternal chest pain, found to have blood glucose >900. She was admitted to MICU on insulin gtt for further DKA management and r/o ACS.  In the MICU, cardiac enzymes initially positive but peaked and decreasing. IV heparin initiated as per ACS protocol.  Patient already on DAPT with ASA and Plavix. Endocrinology was consulted. Patient was transitioned off Insulin GTT yesterday and onto her insulin pump when her AG closed.  She was then transferred to a medical floor for further management per Endocrinology.  While on the floor, she experienced several episodes of hypoglycemia requiring juice and dextrose as she reported taking full dose insulin via pump, but not eating well due to nausea and vomiting.  The insulin pump was then discontinued and she was started on D5W for continued hypoglycemia as patient was refusing to eat.  An RRT was called for weakness/lethargy, however, at that time her glucose was > 200 and the dextrose decreased.  Lab work was sent showing the patient was going back into DKA with small acetate and a beta hydroxy butyrate level of 1.8, glucose of 239, and an AG of 19.  Venous ph noted to be 7.35 with mild hyperkalemia of 5.4.  Patient accepted to MICU for intravenous insulin and management of DKA.    In the MICU the patient's insulin pump remained off while on insulin drip .5U/hr and on D10. In the AM, after the gap was closed, patient was started on diet and D10 was discontinued. She was seen by endocrine and the pump was re-attached at reduced settings from when she last left the MICU to prevent further episodes of hypoglycemia. Patient's FS and anion gap were monitored and transferred to the floor when stable.    PAST MEDICAL & SURGICAL HISTORY:  Subclavian vein stenosis, left: s/p stent  Cellulitis: 2015 left foot  DKA, type 1: 1/2015  ACS (acute coronary syndrome): 1/2015 - cath revealed 100% ostial stenosis not amenable to PCI - medical management  MI, old  TIA (transient ischemic attack): x 2 - 8-9 years ago prior to ASD/VSD repair  CAD (coronary artery disease): s/p stents  Murmur, cardiac  Gout: past  CVA (cerebral infarction): with no residual, 8 yrs ago, prior to heart surgery - ST memory loss  Peripheral vascular disease: occluded left fem-pop bypass 5/2015  Diabetes mellitus type 1: Insulin Dependent - [employer]  Minimed Paradigm Insulin Pump - Novolog  ESRD (end stage renal disease): dialysis , tue, thursday, saturday  Hyperlipidemia  Status post device closure of ASD: &quot; clamshell&quot;  History of cardiac catheterization: 1/2015 - no intervention  S/P femoral-popliteal bypass surgery: L and R in 2013 with graft; 5/2015 CFA angioplasty left and ileofemoral endarterectomywith vein patch angioplasty of left fem-pop bypass graft  Multiple vascular surgery both leg, left fempop bypass revision 11/2015  AV (arteriovenous fistula): Left AV graft; revision with stent placement 2-3 years ago  S/P cholecystectomy      FAMILY HISTORY:  Family history of smoking  Family history of hypertension  Family history of cancer (Sibling)    Allergies: NKA      REVIEW OF SYSTEMS:  Constitutional: No fevers or chills  HEENT: No visual changes  CV: No CP or palpitations  Resp: No cough or SOB  GI: No N/V   : Anuric  Neurological: No headache or dizziness  Endocrine: Diabetes  [x] All other systems negative    ICU Vital Signs Last 24 Hrs  T(C): 36.9 (27 Jul 2017 08:00), Max: 36.9 (27 Jul 2017 00:00)  T(F): 98.5 (27 Jul 2017 08:00), Max: 98.5 (27 Jul 2017 08:00)  HR: 72 (27 Jul 2017 16:00) (61 - 86)  BP: 122/59 (27 Jul 2017 16:00) (96/50 - 171/72)  BP(mean): 85 (27 Jul 2017 16:00) (66 - 103)  ABP: --  ABP(mean): --  RR: 15 (27 Jul 2017 16:00) (11 - 27)  SpO2: 100% (27 Jul 2017 16:00) (99% - 100%)    Physical Exam:  Neuro: Alert & oriented x 4.  Pupils equal and reactive bilaterally  Pulm: CTA bilaterally. Unlabored, no respiratory distress  CV: S1, S2. No murmur  GI: Soft, nontender, BS+ x4  Peripheral Vascular: +2 radial pulses bilaterally, +1 pedal pulses bilaterally. AVF left upper extremity    LABS:                         8.4    4.0   )-----------( 204      ( 27 Jul 2017 03:58 )             24.7     07-27    133<L>  |  92<L>  |  10  ----------------------------<  313<H>  5.0   |  30  |  3.53<H>    Ca    8.0<L>      27 Jul 2017 13:09  Phos  3.2     07-27  Mg     1.9     07-27    TPro  6.7  /  Alb  3.9  /  TBili  0.2  /  DBili  x   /  AST  26  /  ALT  13  /  AlkPhos  232<H>  07-26      Assessment/Plan:   62F with PMH T1DM on insulin pump with multiple complications including ESRD on HD (Tu/Th/Sat), retinopathy, neuropathy, hyperparathyroid, HTN, HLD, former smoker, gout, CAD s/p PCI on ASA and Plavix, dCHF, severe PVD s/p L&R fem-pop bypass graft, CVA, depression, chronic pain on oxycodone with multiple past admissions for DKA, who presented with DKA, now s/p second time in the MICU this admission now going back to the floor with a closed gap on her insulin pump    # DKA: Gap closed. Pump reattached at lower settings.  - Continue with insulin pump as per endocrine  - Monitor FS q4h on the floor  - Zofran for nausea  - Follow-up AM BMP  - Correct low BG with PO juice or low dose (1/2 amp) D50 IV rather than dextrose gtt as per endocrine. Accepting physician: Dr. Viera    CHIEF COMPLAINT: Type 1 Diabetes Mellitus with ketoacidosis    HPI:    62F with PMH T1DM on insulin pump with multiple complications including ESRD on HD (Tu/Th/Sat), retinopathy, neuropathy, hyperparathyroid, HTN, HLD, former smoker, gout, CAD s/p PCI on ASA and Plavix, dCHF, severe PVD s/p L&R fem-pop bypass graft, CVA, depression, chronic pain on oxycodone with multiple past admissions for DKA. Presented to ED from home on 7/23/17 with chief complaint of mid-sternal chest pain, found to have blood glucose >900. She was admitted to MICU on insulin gtt for further DKA management and r/o ACS.  In the MICU, cardiac enzymes initially positive but peaked and decreasing. IV heparin initiated as per ACS protocol.  Patient already on DAPT with ASA and Plavix. Endocrinology was consulted. Patient was transitioned off Insulin GTT yesterday and onto her insulin pump when her AG closed.  She was then transferred to a medical floor for further management per Endocrinology.  While on the floor, she experienced several episodes of hypoglycemia requiring juice and dextrose as she reported taking full dose insulin via pump, but not eating well due to nausea and vomiting.  The insulin pump was then discontinued and she was started on D5W for continued hypoglycemia as patient was refusing to eat.  An RRT was called for weakness/lethargy, however, at that time her glucose was > 200 and the dextrose decreased.  Lab work was sent showing the patient was going back into DKA with small acetate and a beta hydroxy butyrate level of 1.8, glucose of 239, and an AG of 19.  Venous ph noted to be 7.35 with mild hyperkalemia of 5.4.  Patient accepted to MICU for intravenous insulin and management of DKA.    In the MICU the patient's insulin pump remained off while on insulin drip .5U/hr and on D10. In the AM, after the gap was closed, patient was started on diet and D10 was discontinued. She was seen by endocrine and the pump was re-attached at reduced settings from when she last left the MICU to prevent further episodes of hypoglycemia. Patient's FS and anion gap were monitored and transferred to the floor when stable.    PAST MEDICAL & SURGICAL HISTORY:  Subclavian vein stenosis, left: s/p stent  Cellulitis: 2015 left foot  DKA, type 1: 1/2015  ACS (acute coronary syndrome): 1/2015 - cath revealed 100% ostial stenosis not amenable to PCI - medical management  MI, old  TIA (transient ischemic attack): x 2 - 8-9 years ago prior to ASD/VSD repair  CAD (coronary artery disease): s/p stents  Murmur, cardiac  Gout: past  CVA (cerebral infarction): with no residual, 8 yrs ago, prior to heart surgery - ST memory loss  Peripheral vascular disease: occluded left fem-pop bypass 5/2015  Diabetes mellitus type 1: Insulin Dependent - [employer]  Minimed Paradigm Insulin Pump - Novolog  ESRD (end stage renal disease): dialysis , tue, thursday, saturday  Hyperlipidemia  Status post device closure of ASD: &quot; clamshell&quot;  History of cardiac catheterization: 1/2015 - no intervention  S/P femoral-popliteal bypass surgery: L and R in 2013 with graft; 5/2015 CFA angioplasty left and ileofemoral endarterectomywith vein patch angioplasty of left fem-pop bypass graft  Multiple vascular surgery both leg, left fempop bypass revision 11/2015  AV (arteriovenous fistula): Left AV graft; revision with stent placement 2-3 years ago  S/P cholecystectomy      FAMILY HISTORY:  Family history of smoking  Family history of hypertension  Family history of cancer (Sibling)    Allergies: NKA      REVIEW OF SYSTEMS:  Constitutional: No fevers or chills  HEENT: No visual changes  CV: No CP or palpitations  Resp: No cough or SOB  GI: No N/V   : Anuric  Neurological: No headache or dizziness  Endocrine: Diabetes  [x] All other systems negative    ICU Vital Signs Last 24 Hrs  T(C): 36.9 (27 Jul 2017 08:00), Max: 36.9 (27 Jul 2017 00:00)  T(F): 98.5 (27 Jul 2017 08:00), Max: 98.5 (27 Jul 2017 08:00)  HR: 72 (27 Jul 2017 16:00) (61 - 86)  BP: 122/59 (27 Jul 2017 16:00) (96/50 - 171/72)  BP(mean): 85 (27 Jul 2017 16:00) (66 - 103)  ABP: --  ABP(mean): --  RR: 15 (27 Jul 2017 16:00) (11 - 27)  SpO2: 100% (27 Jul 2017 16:00) (99% - 100%)    Physical Exam:  Neuro: Alert & oriented x 4.  Pupils equal and reactive bilaterally  Pulm: CTA bilaterally. Unlabored, no respiratory distress  CV: S1, S2. No murmur  GI: Soft, nontender, BS+ x4  Peripheral Vascular: +2 radial pulses bilaterally, +1 pedal pulses bilaterally. AVF left upper extremity    LABS:                         8.4    4.0   )-----------( 204      ( 27 Jul 2017 03:58 )             24.7     07-27    133<L>  |  92<L>  |  10  ----------------------------<  313<H>  5.0   |  30  |  3.53<H>    Ca    8.0<L>      27 Jul 2017 13:09  Phos  3.2     07-27  Mg     1.9     07-27    TPro  6.7  /  Alb  3.9  /  TBili  0.2  /  DBili  x   /  AST  26  /  ALT  13  /  AlkPhos  232<H>  07-26      Assessment/Plan:   62F with PMH T1DM on insulin pump with multiple complications including ESRD on HD (Tu/Th/Sat), retinopathy, neuropathy, hyperparathyroid, HTN, HLD, former smoker, gout, CAD s/p PCI on ASA and Plavix, dCHF, severe PVD s/p L&R fem-pop bypass graft, CVA, depression, chronic pain on oxycodone with multiple past admissions for DKA, who presented with DKA, now s/p second time in the MICU this admission now going back to the floor with a closed gap on her insulin pump    # DKA: Gap closed. Pump reattached at lower settings.  - Continue with insulin pump as per endocrine  - Monitor FS q4h on the floor  - Zofran for nausea  - Follow-up AM BMP  - Correct low BG with PO juice or low dose (1/2 amp) D50 IV rather than dextrose gtt as per endocrine.

## 2017-07-28 LAB
ALBUMIN SERPL ELPH-MCNC: 3.5 G/DL — SIGNIFICANT CHANGE UP (ref 3.3–5)
ALP SERPL-CCNC: 197 U/L — HIGH (ref 40–120)
ALT FLD-CCNC: 11 U/L RC — SIGNIFICANT CHANGE UP (ref 10–45)
ANION GAP SERPL CALC-SCNC: 13 MMOL/L — SIGNIFICANT CHANGE UP (ref 5–17)
AST SERPL-CCNC: 17 U/L — SIGNIFICANT CHANGE UP (ref 10–40)
BASOPHILS # BLD AUTO: 0 K/UL — SIGNIFICANT CHANGE UP (ref 0–0.2)
BASOPHILS NFR BLD AUTO: 1 % — SIGNIFICANT CHANGE UP (ref 0–2)
BILIRUB SERPL-MCNC: 0.3 MG/DL — SIGNIFICANT CHANGE UP (ref 0.2–1.2)
BUN SERPL-MCNC: 20 MG/DL — SIGNIFICANT CHANGE UP (ref 7–23)
CALCIUM SERPL-MCNC: 7.9 MG/DL — LOW (ref 8.4–10.5)
CHLORIDE SERPL-SCNC: 96 MMOL/L — SIGNIFICANT CHANGE UP (ref 96–108)
CO2 SERPL-SCNC: 25 MMOL/L — SIGNIFICANT CHANGE UP (ref 22–31)
CREAT SERPL-MCNC: 5.37 MG/DL — HIGH (ref 0.5–1.3)
EOSINOPHIL # BLD AUTO: 0.1 K/UL — SIGNIFICANT CHANGE UP (ref 0–0.5)
EOSINOPHIL NFR BLD AUTO: 3.3 % — SIGNIFICANT CHANGE UP (ref 0–6)
GLUCOSE SERPL-MCNC: 200 MG/DL — HIGH (ref 70–99)
HCT VFR BLD CALC: 25.2 % — LOW (ref 34.5–45)
HGB BLD-MCNC: 8.4 G/DL — LOW (ref 11.5–15.5)
LYMPHOCYTES # BLD AUTO: 0.7 K/UL — LOW (ref 1–3.3)
LYMPHOCYTES # BLD AUTO: 17.8 % — SIGNIFICANT CHANGE UP (ref 13–44)
MAGNESIUM SERPL-MCNC: 2.1 MG/DL — SIGNIFICANT CHANGE UP (ref 1.6–2.6)
MCHC RBC-ENTMCNC: 33.2 GM/DL — SIGNIFICANT CHANGE UP (ref 32–36)
MCHC RBC-ENTMCNC: 34.5 PG — HIGH (ref 27–34)
MCV RBC AUTO: 104 FL — HIGH (ref 80–100)
MONOCYTES # BLD AUTO: 0.5 K/UL — SIGNIFICANT CHANGE UP (ref 0–0.9)
MONOCYTES NFR BLD AUTO: 11.4 % — SIGNIFICANT CHANGE UP (ref 2–14)
NEUTROPHILS # BLD AUTO: 2.7 K/UL — SIGNIFICANT CHANGE UP (ref 1.8–7.4)
NEUTROPHILS NFR BLD AUTO: 66.4 % — SIGNIFICANT CHANGE UP (ref 43–77)
PHOSPHATE SERPL-MCNC: 3.6 MG/DL — SIGNIFICANT CHANGE UP (ref 2.5–4.5)
PLATELET # BLD AUTO: 202 K/UL — SIGNIFICANT CHANGE UP (ref 150–400)
POTASSIUM SERPL-MCNC: 4.7 MMOL/L — SIGNIFICANT CHANGE UP (ref 3.5–5.3)
POTASSIUM SERPL-SCNC: 4.7 MMOL/L — SIGNIFICANT CHANGE UP (ref 3.5–5.3)
PROT SERPL-MCNC: 6 G/DL — SIGNIFICANT CHANGE UP (ref 6–8.3)
RBC # BLD: 2.42 M/UL — LOW (ref 3.8–5.2)
RBC # FLD: 12.2 % — SIGNIFICANT CHANGE UP (ref 10.3–14.5)
SODIUM SERPL-SCNC: 134 MMOL/L — LOW (ref 135–145)
WBC # BLD: 4 K/UL — SIGNIFICANT CHANGE UP (ref 3.8–10.5)
WBC # FLD AUTO: 4 K/UL — SIGNIFICANT CHANGE UP (ref 3.8–10.5)

## 2017-07-28 RX ORDER — INSULIN LISPRO 100/ML
1 VIAL (ML) SUBCUTANEOUS
Qty: 0 | Refills: 0 | Status: DISCONTINUED | OUTPATIENT
Start: 2017-07-28 | End: 2017-08-02

## 2017-07-28 RX ORDER — METOCLOPRAMIDE HCL 10 MG
5 TABLET ORAL ONCE
Qty: 0 | Refills: 0 | Status: COMPLETED | OUTPATIENT
Start: 2017-07-28 | End: 2017-07-28

## 2017-07-28 RX ADMIN — Medication 5 MILLIGRAM(S): at 20:18

## 2017-07-28 RX ADMIN — Medication 100 MILLIGRAM(S): at 05:06

## 2017-07-28 RX ADMIN — ONDANSETRON 4 MILLIGRAM(S): 8 TABLET, FILM COATED ORAL at 17:25

## 2017-07-28 RX ADMIN — Medication 100 MILLIGRAM(S): at 21:31

## 2017-07-28 RX ADMIN — Medication 100 MILLIGRAM(S): at 05:05

## 2017-07-28 RX ADMIN — SEVELAMER CARBONATE 2400 MILLIGRAM(S): 2400 POWDER, FOR SUSPENSION ORAL at 08:32

## 2017-07-28 RX ADMIN — SENNA PLUS 2 TABLET(S): 8.6 TABLET ORAL at 21:31

## 2017-07-28 RX ADMIN — CINACALCET 60 MILLIGRAM(S): 30 TABLET, FILM COATED ORAL at 13:09

## 2017-07-28 RX ADMIN — Medication 81 MILLIGRAM(S): at 13:09

## 2017-07-28 RX ADMIN — DULOXETINE HYDROCHLORIDE 60 MILLIGRAM(S): 30 CAPSULE, DELAYED RELEASE ORAL at 13:09

## 2017-07-28 RX ADMIN — Medication 100 MILLIGRAM(S): at 16:12

## 2017-07-28 RX ADMIN — CLOPIDOGREL BISULFATE 75 MILLIGRAM(S): 75 TABLET, FILM COATED ORAL at 13:09

## 2017-07-28 RX ADMIN — Medication 50 MILLIGRAM(S): at 05:05

## 2017-07-28 RX ADMIN — Medication 100 MILLIGRAM(S): at 16:11

## 2017-07-28 RX ADMIN — OXYCODONE AND ACETAMINOPHEN 1 TABLET(S): 5; 325 TABLET ORAL at 01:34

## 2017-07-28 RX ADMIN — ATORVASTATIN CALCIUM 20 MILLIGRAM(S): 80 TABLET, FILM COATED ORAL at 21:31

## 2017-07-28 RX ADMIN — OXYCODONE AND ACETAMINOPHEN 1 TABLET(S): 5; 325 TABLET ORAL at 19:52

## 2017-07-28 RX ADMIN — OXYCODONE AND ACETAMINOPHEN 1 TABLET(S): 5; 325 TABLET ORAL at 20:22

## 2017-07-28 RX ADMIN — SEVELAMER CARBONATE 2400 MILLIGRAM(S): 2400 POWDER, FOR SUSPENSION ORAL at 13:09

## 2017-07-28 NOTE — PROGRESS NOTE ADULT - SUBJECTIVE AND OBJECTIVE BOX
CARDIOLOGY FOLLOW UP     CC no chest pain or sob       PHYSICAL EXAM:  T(C): 36.6 (07-28-17 @ 05:00), Max: 36.9 (07-27-17 @ 20:00)  HR: 82 (07-28-17 @ 05:00) (67 - 86)  BP: 127/63 (07-28-17 @ 05:00) (97/54 - 178/74)  RR: 18 (07-28-17 @ 05:49) (11 - 21)  SpO2: 100% (07-28-17 @ 05:49) (94% - 100%)  Wt(kg): --  I&O's Summary    27 Jul 2017 07:01  -  28 Jul 2017 07:00  --------------------------------------------------------  IN: 523 mL / OUT: 0 mL / NET: 523 mL        Appearance: Normal	  Cardiovascular: Normal S1 S2,RRR, No JVD, No murmurs  Respiratory: Diminished at base bl   Gastrointestinal:  Soft, Non-tender, + BS	  Extremities: Normal range of motion, No clubbing, cyanosis or edema        MEDICATIONS  (STANDING):  DULoxetine 60 milliGRAM(s) Oral daily  atorvastatin 20 milliGRAM(s) Oral at bedtime  clopidogrel Tablet 75 milliGRAM(s) Oral daily  metoprolol succinate ER 50 milliGRAM(s) Oral daily  aspirin enteric coated 81 milliGRAM(s) Oral daily  cinacalcet 60 milliGRAM(s) Oral daily  hydrALAZINE 100 milliGRAM(s) Oral every 8 hours  dextrose 50% Injectable 50 milliLiter(s) IV Push every 15 minutes  dextrose 50% Injectable 25 milliLiter(s) IV Push every 15 minutes  senna 2 Tablet(s) Oral at bedtime  docusate sodium 100 milliGRAM(s) Oral three times a day  dextrose 5%. 1000 milliLiter(s) (50 mL/Hr) IV Continuous <Continuous>  sevelamer hydrochloride 2400 milliGRAM(s) Oral three times a day with meals  heparin  Injectable 5000 Unit(s) SubCutaneous every 8 hours        OTHER: 	    LABS:	 	                                8.4    4.0   )-----------( 202      ( 28 Jul 2017 08:40 )             25.2     07-28    134<L>  |  96  |  20  ----------------------------<  200<H>  4.7   |  25  |  5.37<H>    Ca    7.9<L>      28 Jul 2017 08:40  Phos  3.6     07-28  Mg     2.1     07-28    TPro  6.0  /  Alb  3.5  /  TBili  0.3  /  DBili  x   /  AST  17  /  ALT  11  /  AlkPhos  197<H>  07-28    PT/INR - ( 26 Jul 2017 14:23 )   PT: 10.7 sec;   INR: 0.98 ratio         PTT - ( 27 Jul 2017 03:58 )  PTT:28.9 sec

## 2017-07-28 NOTE — PROGRESS NOTE ADULT - ASSESSMENT
62 yo F with DM, ESRD. PVD with recurrent DKA/hyperlgycemia. has hx of LLE bypass as well  1- DM/DKA  2- ESRD  3- recent hyperkalemia on admission   4- NSTEMI  5- htn     insulin to cont  asa/plavix   hd am  lipitor    hydralazine 100mg tid.   sensipar 60mg qd check pth   PT

## 2017-07-28 NOTE — PROGRESS NOTE ADULT - ASSESSMENT
62 f with DKA / diabetes Mellitus type 1  BS control/ endocrine follow. DKA resolvig- restart insulin pump/ bridge with Insulin gtt.  s/p NSTEMI-AC, asa/Plavix. Cardiology follow, pain control Possible catheterization.  ESRD- continue HD  Peripheral Vascular disease- conservative management.  Discussed with  JUANJO Viera MD pager [phone]

## 2017-07-28 NOTE — PROGRESS NOTE ADULT - ASSESSMENT
62 F CAD, s/p PCI, s/p brachy RCA, d-CHF, asd repair, ESRD admitted with chest pain, hyperglycemia, s/p MICU  for DKA     1. cv stable   no chest pain or sob.    2. CAD/ PCI   continue with ASA/ Plavix/ statin   consider repeat cath once medically optimize  due to high risk anatomy and likelihood of restenosis    3. DCHF  volume status improving   volume removal with HD   continue with BB     4. Diabetic ketoacidosis / DM type 1  s/p MICU   med f/u     5. htn   episodic elevated reading noted   continue  with current antihtn meds   trend bp for now     6. DVT ppx

## 2017-07-28 NOTE — PROGRESS NOTE ADULT - SUBJECTIVE AND OBJECTIVE BOX
Patient is a 62y old  Female who presents with a chief complaint of     SUBJECTIVE / OVERNIGHT EVENTS: Comfortable without new complaints.  at bedside.  Review of Systems  chest pain no  palpitations no  sob no  nausea no  headache no    MEDICATIONS  (STANDING):  DULoxetine 60 milliGRAM(s) Oral daily  atorvastatin 20 milliGRAM(s) Oral at bedtime  clopidogrel Tablet 75 milliGRAM(s) Oral daily  metoprolol succinate ER 50 milliGRAM(s) Oral daily  aspirin enteric coated 81 milliGRAM(s) Oral daily  cinacalcet 60 milliGRAM(s) Oral daily  hydrALAZINE 100 milliGRAM(s) Oral every 8 hours  dextrose 50% Injectable 50 milliLiter(s) IV Push every 15 minutes  dextrose 50% Injectable 25 milliLiter(s) IV Push every 15 minutes  senna 2 Tablet(s) Oral at bedtime  docusate sodium 100 milliGRAM(s) Oral three times a day  dextrose 5%. 1000 milliLiter(s) (50 mL/Hr) IV Continuous <Continuous>  sevelamer hydrochloride 2400 milliGRAM(s) Oral three times a day with meals  heparin  Injectable 5000 Unit(s) SubCutaneous every 8 hours    MEDICATIONS  (PRN):  oxyCODONE    5 mG/acetaminophen 325 mG 1 Tablet(s) Oral every 6 hours PRN Severe Pain (7 - 10)  acetaminophen   Tablet. 650 milliGRAM(s) Oral every 6 hours PRN Mild and/or moderate Pain  ondansetron Injectable 4 milliGRAM(s) IV Push every 6 hours PRN Nausea and/or Vomiting  dextrose Gel 1 Dose(s) Oral once PRN Blood Glucose LESS THAN 70 milliGRAM(s)/deciLiter  glucagon  Injectable 1 milliGRAM(s) IntraMuscular once PRN Glucose <70 milliGRAM(s)/deciLiter          PHYSICAL EXAM:  GENERAL: NAD, well-developed  HEAD:  Atraumatic, Normocephalic  EYES: EOMI, PERRLA, conjunctiva and sclera clear  NECK: Supple, No JVD  CHEST/LUNG: Clear to auscultation bilaterally; No wheeze  HEART: Regular rate and rhythm; No murmurs, rubs, or gallops  ABDOMEN: Soft, Nontender, Nondistended; Bowel sounds present  EXTREMITIES:  2+ Peripheral Pulses, No clubbing, cyanosis, or edema  PSYCH: AAOx3  NEUROLOGY: non-focal  SKIN: No rashes or lesions    LABS:                        8.4    4.0   )-----------( 202      ( 28 Jul 2017 08:40 )             25.2     07-28    134<L>  |  96  |  20  ----------------------------<  200<H>  4.7   |  25  |  5.37<H>    Ca    7.9<L>      28 Jul 2017 08:40  Phos  3.6     07-28  Mg     2.1     07-28    TPro  6.0  /  Alb  3.5  /  TBili  0.3  /  DBili  x   /  AST  17  /  ALT  11  /  AlkPhos  197<H>  07-28    PTT - ( 27 Jul 2017 03:58 )  PTT:28.9 sec          RADIOLOGY & ADDITIONAL TESTS:    Imaging Personally Reviewed:    Consultant(s) Notes Reviewed:      Care Discussed with Consultants/Other Providers:

## 2017-07-28 NOTE — PROGRESS NOTE ADULT - SUBJECTIVE AND OBJECTIVE BOX
[facility]   [phone]  RENAL FOLLOW UP NOTE  --------------------------------------------------------------------------------  Chief Complaint:    24 hour events/subjective:    still with c/o fatigue    PAST HISTORY  --------------------------------------------------------------------------------  No significant changes to PMH, PSH, FHx, SHx, unless otherwise noted    ALLERGIES & MEDICATIONS  --------------------------------------------------------------------------------  Allergies    No Known Allergies    Intolerances      Standing Inpatient Medications  DULoxetine 60 milliGRAM(s) Oral daily  atorvastatin 20 milliGRAM(s) Oral at bedtime  clopidogrel Tablet 75 milliGRAM(s) Oral daily  metoprolol succinate ER 50 milliGRAM(s) Oral daily  aspirin enteric coated 81 milliGRAM(s) Oral daily  cinacalcet 60 milliGRAM(s) Oral daily  hydrALAZINE 100 milliGRAM(s) Oral every 8 hours  dextrose 50% Injectable 50 milliLiter(s) IV Push every 15 minutes  dextrose 50% Injectable 25 milliLiter(s) IV Push every 15 minutes  senna 2 Tablet(s) Oral at bedtime  docusate sodium 100 milliGRAM(s) Oral three times a day  dextrose 5%. 1000 milliLiter(s) IV Continuous <Continuous>  sevelamer hydrochloride 2400 milliGRAM(s) Oral three times a day with meals  heparin  Injectable 5000 Unit(s) SubCutaneous every 8 hours    PRN Inpatient Medications  oxyCODONE    5 mG/acetaminophen 325 mG 1 Tablet(s) Oral every 6 hours PRN  acetaminophen   Tablet. 650 milliGRAM(s) Oral every 6 hours PRN  ondansetron Injectable 4 milliGRAM(s) IV Push every 6 hours PRN  dextrose Gel 1 Dose(s) Oral once PRN  glucagon  Injectable 1 milliGRAM(s) IntraMuscular once PRN      REVIEW OF SYSTEMS  --------------------------------------------------------------------------------    Gen: +  fatigue, -  fevers/chills,  CVS: denies chest pain/palpitations  Resp: denies SOB/Cough  GI: Denies N/V/Abd pain  : Denies dysuria/oliguria/hematuria  pain left foot persists    All other systems were reviewed and are negative, except as noted.    VITALS/PHYSICAL EXAM  --------------------------------------------------------------------------------  T(C): 36.8 (07-28-17 @ 09:21), Max: 36.9 (07-27-17 @ 20:00)  HR: 86 (07-28-17 @ 09:21) (69 - 86)  BP: 184/74 (07-28-17 @ 09:21) (97/54 - 184/74)  RR: 18 (07-28-17 @ 09:21) (12 - 21)  SpO2: 100% (07-28-17 @ 09:21) (94% - 100%)  Wt(kg): --        07-27-17 @ 07:01  -  07-28-17 @ 07:00  --------------------------------------------------------  IN: 523 mL / OUT: 0 mL / NET: 523 mL      Physical Exam:  	    Physical Exam:  	Gen: alert oriented place person and date   	Pulm: Decreased breath sounds b/l bases. no rales or ronchi or wheezing  	CV: RRR, S1/S2. no rub  	Back: No CVA tenderness; no sacral edema  	Abd: +BS, soft, nontender/nondistended  	: No suprapubic tenderness.               Extremity: No cyanosis, trace LLE edema no clubbing  	  LABS/STUDIES  --------------------------------------------------------------------------------              8.4    4.0   >-----------<  202      [07-28-17 @ 08:40]              25.2     134  |  96  |  20  ----------------------------<  200      [07-28-17 @ 08:40]  4.7   |  25  |  5.37        Ca     7.9     [07-28-17 @ 08:40]      Mg     2.1     [07-28-17 @ 08:40]      Phos  3.6     [07-28-17 @ 08:40]    TPro  6.0  /  Alb  3.5  /  TBili  0.3  /  DBili  x   /  AST  17  /  ALT  11  /  AlkPhos  197  [07-28-17 @ 08:40]    PT/INR: PT 10.7 , INR 0.98       [07-26-17 @ 14:23]  PTT: 28.9       [07-27-17 @ 03:58]    Troponin 1.38      [07-26-17 @ 14:23]        [07-26-17 @ 14:23]    Creatinine Trend:  SCr 5.37 [07-28 @ 08:40]  SCr 4.30 [07-27 @ 21:10]  SCr 4.03 [07-27 @ 16:51]  SCr 3.53 [07-27 @ 13:09]  SCr 3.10 [07-27 @ 08:16]                  HbA1c 6.8      [07-24-17 @ 06:15]

## 2017-07-29 LAB
ALBUMIN SERPL ELPH-MCNC: 3.5 G/DL — SIGNIFICANT CHANGE UP (ref 3.3–5)
ALP SERPL-CCNC: 219 U/L — HIGH (ref 40–120)
ALT FLD-CCNC: 7 U/L — LOW (ref 10–45)
ANION GAP SERPL CALC-SCNC: 17 MMOL/L — SIGNIFICANT CHANGE UP (ref 5–17)
ANION GAP SERPL CALC-SCNC: 18 MMOL/L — HIGH (ref 5–17)
AST SERPL-CCNC: 15 U/L — SIGNIFICANT CHANGE UP (ref 10–40)
BILIRUB SERPL-MCNC: 0.3 MG/DL — SIGNIFICANT CHANGE UP (ref 0.2–1.2)
BUN SERPL-MCNC: 27 MG/DL — HIGH (ref 7–23)
BUN SERPL-MCNC: 29 MG/DL — HIGH (ref 7–23)
CALCIUM SERPL-MCNC: 7.8 MG/DL — LOW (ref 8.4–10.5)
CALCIUM SERPL-MCNC: 8.1 MG/DL — LOW (ref 8.4–10.5)
CHLORIDE SERPL-SCNC: 93 MMOL/L — LOW (ref 96–108)
CHLORIDE SERPL-SCNC: 94 MMOL/L — LOW (ref 96–108)
CO2 SERPL-SCNC: 22 MMOL/L — SIGNIFICANT CHANGE UP (ref 22–31)
CO2 SERPL-SCNC: 23 MMOL/L — SIGNIFICANT CHANGE UP (ref 22–31)
CREAT SERPL-MCNC: 6.6 MG/DL — HIGH (ref 0.5–1.3)
CREAT SERPL-MCNC: 6.67 MG/DL — HIGH (ref 0.5–1.3)
CULTURE RESULTS: SIGNIFICANT CHANGE UP
CULTURE RESULTS: SIGNIFICANT CHANGE UP
GLUCOSE SERPL-MCNC: 184 MG/DL — HIGH (ref 70–99)
GLUCOSE SERPL-MCNC: 185 MG/DL — HIGH (ref 70–99)
HCT VFR BLD CALC: 25.6 % — LOW (ref 34.5–45)
HGB BLD-MCNC: 8.1 G/DL — LOW (ref 11.5–15.5)
MAGNESIUM SERPL-MCNC: 2.1 MG/DL — SIGNIFICANT CHANGE UP (ref 1.6–2.6)
MCHC RBC-ENTMCNC: 31.6 GM/DL — LOW (ref 32–36)
MCHC RBC-ENTMCNC: 32.4 PG — SIGNIFICANT CHANGE UP (ref 27–34)
MCV RBC AUTO: 102.4 FL — HIGH (ref 80–100)
PHOSPHATE SERPL-MCNC: 3.5 MG/DL — SIGNIFICANT CHANGE UP (ref 2.5–4.5)
PLATELET # BLD AUTO: 237 K/UL — SIGNIFICANT CHANGE UP (ref 150–400)
POTASSIUM SERPL-MCNC: 4.7 MMOL/L — SIGNIFICANT CHANGE UP (ref 3.5–5.3)
POTASSIUM SERPL-MCNC: 4.7 MMOL/L — SIGNIFICANT CHANGE UP (ref 3.5–5.3)
POTASSIUM SERPL-SCNC: 4.7 MMOL/L — SIGNIFICANT CHANGE UP (ref 3.5–5.3)
POTASSIUM SERPL-SCNC: 4.7 MMOL/L — SIGNIFICANT CHANGE UP (ref 3.5–5.3)
PROT SERPL-MCNC: 6.4 G/DL — SIGNIFICANT CHANGE UP (ref 6–8.3)
RBC # BLD: 2.5 M/UL — LOW (ref 3.8–5.2)
RBC # FLD: 13.8 % — SIGNIFICANT CHANGE UP (ref 10.3–14.5)
SODIUM SERPL-SCNC: 133 MMOL/L — LOW (ref 135–145)
SODIUM SERPL-SCNC: 134 MMOL/L — LOW (ref 135–145)
SPECIMEN SOURCE: SIGNIFICANT CHANGE UP
SPECIMEN SOURCE: SIGNIFICANT CHANGE UP
WBC # BLD: 5.23 K/UL — SIGNIFICANT CHANGE UP (ref 3.8–10.5)
WBC # FLD AUTO: 5.23 K/UL — SIGNIFICANT CHANGE UP (ref 3.8–10.5)

## 2017-07-29 RX ORDER — METOPROLOL TARTRATE 50 MG
100 TABLET ORAL DAILY
Qty: 0 | Refills: 0 | Status: DISCONTINUED | OUTPATIENT
Start: 2017-07-29 | End: 2017-08-03

## 2017-07-29 RX ORDER — ERYTHROPOIETIN 10000 [IU]/ML
10000 INJECTION, SOLUTION INTRAVENOUS; SUBCUTANEOUS ONCE
Qty: 0 | Refills: 0 | Status: COMPLETED | OUTPATIENT
Start: 2017-07-29 | End: 2017-07-29

## 2017-07-29 RX ADMIN — Medication 50 MILLIGRAM(S): at 05:52

## 2017-07-29 RX ADMIN — SEVELAMER CARBONATE 2400 MILLIGRAM(S): 2400 POWDER, FOR SUSPENSION ORAL at 11:45

## 2017-07-29 RX ADMIN — SENNA PLUS 2 TABLET(S): 8.6 TABLET ORAL at 21:18

## 2017-07-29 RX ADMIN — ATORVASTATIN CALCIUM 20 MILLIGRAM(S): 80 TABLET, FILM COATED ORAL at 21:18

## 2017-07-29 RX ADMIN — ERYTHROPOIETIN 10000 UNIT(S): 10000 INJECTION, SOLUTION INTRAVENOUS; SUBCUTANEOUS at 16:46

## 2017-07-29 RX ADMIN — DULOXETINE HYDROCHLORIDE 60 MILLIGRAM(S): 30 CAPSULE, DELAYED RELEASE ORAL at 11:45

## 2017-07-29 RX ADMIN — Medication 100 MILLIGRAM(S): at 21:18

## 2017-07-29 RX ADMIN — SEVELAMER CARBONATE 2400 MILLIGRAM(S): 2400 POWDER, FOR SUSPENSION ORAL at 08:35

## 2017-07-29 RX ADMIN — OXYCODONE AND ACETAMINOPHEN 1 TABLET(S): 5; 325 TABLET ORAL at 02:26

## 2017-07-29 RX ADMIN — SEVELAMER CARBONATE 2400 MILLIGRAM(S): 2400 POWDER, FOR SUSPENSION ORAL at 17:36

## 2017-07-29 RX ADMIN — OXYCODONE AND ACETAMINOPHEN 1 TABLET(S): 5; 325 TABLET ORAL at 01:56

## 2017-07-29 RX ADMIN — Medication 100 MILLIGRAM(S): at 05:52

## 2017-07-29 RX ADMIN — Medication 81 MILLIGRAM(S): at 11:45

## 2017-07-29 RX ADMIN — CINACALCET 60 MILLIGRAM(S): 30 TABLET, FILM COATED ORAL at 11:45

## 2017-07-29 RX ADMIN — CLOPIDOGREL BISULFATE 75 MILLIGRAM(S): 75 TABLET, FILM COATED ORAL at 11:45

## 2017-07-29 NOTE — PROGRESS NOTE ADULT - SUBJECTIVE AND OBJECTIVE BOX
Chief Complaint: f/u DM1    History:   Overnight - no hypoglycemia  Patient seen and examined at bedside. Reports eating breakfast this A.M. and bolused insulin prior to breakfast. Did not eat lunch. FS low 200's.    MEDICATIONS  (STANDING):  DULoxetine 60 milliGRAM(s) Oral daily  atorvastatin 20 milliGRAM(s) Oral at bedtime  clopidogrel Tablet 75 milliGRAM(s) Oral daily  aspirin enteric coated 81 milliGRAM(s) Oral daily  cinacalcet 60 milliGRAM(s) Oral daily  hydrALAZINE 100 milliGRAM(s) Oral every 8 hours  dextrose 50% Injectable 50 milliLiter(s) IV Push every 15 minutes  dextrose 50% Injectable 25 milliLiter(s) IV Push every 15 minutes  senna 2 Tablet(s) Oral at bedtime  docusate sodium 100 milliGRAM(s) Oral three times a day  dextrose 5%. 1000 milliLiter(s) (50 mL/Hr) IV Continuous <Continuous>  sevelamer hydrochloride 2400 milliGRAM(s) Oral three times a day with meals  heparin  Injectable 5000 Unit(s) SubCutaneous every 8 hours  insulin lispro (HumaLOG) Pump 1 Each SubCutaneous Continuous Pump  metoprolol succinate  milliGRAM(s) Oral daily  epoetin azalea Injectable 40058 Unit(s) IV Push once    MEDICATIONS  (PRN):  oxyCODONE    5 mG/acetaminophen 325 mG 1 Tablet(s) Oral every 6 hours PRN Severe Pain (7 - 10)  acetaminophen   Tablet. 650 milliGRAM(s) Oral every 6 hours PRN Mild and/or moderate Pain  ondansetron Injectable 4 milliGRAM(s) IV Push every 6 hours PRN Nausea and/or Vomiting  dextrose Gel 1 Dose(s) Oral once PRN Blood Glucose LESS THAN 70 milliGRAM(s)/deciLiter  glucagon  Injectable 1 milliGRAM(s) IntraMuscular once PRN Glucose <70 milliGRAM(s)/deciLiter      Allergies    No Known Allergies      Review of Systems:  Constitutional: No fever  Cardiovascular: No chest pain, palpitations  Respiratory: No SOB, no cough  GI: No nausea, vomiting, abdominal pain  : No dysuria    ALL OTHER SYSTEMS REVIEWED AND NEGATIVE    PHYSICAL EXAM:  VITALS: T(C): 36.6 (07-29-17 @ 14:19)  T(F): 97.9 (07-29-17 @ 14:19), Max: 98.8 (07-28-17 @ 21:02)  HR: 65 (07-29-17 @ 14:19) (65 - 87)  BP: 169/78 (07-29-17 @ 14:19) (163/67 - 196/77)  RR:  (18 - 18)  SpO2:  (94% - 100%)  Wt(kg): --  GENERAL: NAD, well-groomed, well-developed  RESPIRATORY: Clear to auscultation bilaterally; No rales, rhonchi, wheezing, or rubs  CARDIOVASCULAR: Regular rate and rhythm; No murmurs; no peripheral edema  GI: Soft, nontender, non distended, normal bowel sounds  PSYCH: Alert and oriented x 3, normal affect, normal mood    CAPILLARY BLOOD GLUCOSE  200 (07-29 @ 12:09)  209 (07-29 @ 08:14)  181 (07-29 @ 05:49)  222 (07-29 @ 01:44)  177 (07-28 @ 21:49)  90 (07-28 @ 17:33)  187 (07-28 @ 11:46)  212 (07-28 @ 07:55)  156 (07-28 @ 01:58)  129 (07-27 @ 22:00)  160 (07-27 @ 18:00)  196 (07-27 @ 14:00)  286 (07-27 @ 13:00)  295 (07-27 @ 12:00)  231 (07-27 @ 11:00)  230 (07-27 @ 10:00)  180 (07-27 @ 09:00)  181 (07-27 @ 08:00)  184 (07-27 @ 07:00)  154 (07-27 @ 06:00)  144 (07-27 @ 05:08)  116 (07-27 @ 04:00)  120 (07-27 @ 03:00)  106 (07-27 @ 02:00)  120 (07-27 @ 01:00)  142 (07-27 @ 00:00)  176 (07-26 @ 23:12)  202 (07-26 @ 22:00)  230 (07-26 @ 21:00)  244 (07-26 @ 20:00)  279 (07-26 @ 18:01)  296 (07-26 @ 16:03)  301 (07-26 @ 15:00)      07-29    133<L>  |  93<L>  |  27<H>  ----------------------------<  184<H>  4.7   |  22  |  6.60<H>    EGFR if : 7<L>  EGFR if non : 6<L>    Ca    8.1<L>      07-29  Mg     2.1     07-29  Phos  3.5     07-29    TPro  6.4  /  Alb  3.5  /  TBili  0.3  /  DBili  x   /  AST  15  /  ALT  7<L>  /  AlkPhos  219<H>  07-29          Thyroid Function Tests:      Hemoglobin A1C, Whole Blood: 6.8 % <H> [4.0 - 5.6] (07-24-17 @ 06:15)  Hemoglobin A1C, Whole Blood: 6.8 % <H> [4.0 - 5.6] (07-23-17 @ 22:45)  Hemoglobin A1C, Whole Blood: 7.3 % <H> [4.0 - 5.6] (06-16-17 @ 04:45)

## 2017-07-29 NOTE — PROGRESS NOTE ADULT - SUBJECTIVE AND OBJECTIVE BOX
[facility]   [phone]  RENAL FOLLOW UP NOTE  --------------------------------------------------------------------------------  Chief Complaint:    24 hour events/subjective:    c/o N/V overnight. no abd pain . left foot pain     PAST HISTORY  --------------------------------------------------------------------------------  No significant changes to PMH, PSH, FHx, SHx, unless otherwise noted    ALLERGIES & MEDICATIONS  --------------------------------------------------------------------------------  Allergies    No Known Allergies    Intolerances      Standing Inpatient Medications  DULoxetine 60 milliGRAM(s) Oral daily  atorvastatin 20 milliGRAM(s) Oral at bedtime  clopidogrel Tablet 75 milliGRAM(s) Oral daily  aspirin enteric coated 81 milliGRAM(s) Oral daily  cinacalcet 60 milliGRAM(s) Oral daily  hydrALAZINE 100 milliGRAM(s) Oral every 8 hours  dextrose 50% Injectable 50 milliLiter(s) IV Push every 15 minutes  dextrose 50% Injectable 25 milliLiter(s) IV Push every 15 minutes  senna 2 Tablet(s) Oral at bedtime  docusate sodium 100 milliGRAM(s) Oral three times a day  dextrose 5%. 1000 milliLiter(s) IV Continuous <Continuous>  sevelamer hydrochloride 2400 milliGRAM(s) Oral three times a day with meals  heparin  Injectable 5000 Unit(s) SubCutaneous every 8 hours  insulin lispro (HumaLOG) Pump 1 Each SubCutaneous Continuous Pump  metoprolol succinate  milliGRAM(s) Oral daily    PRN Inpatient Medications  oxyCODONE    5 mG/acetaminophen 325 mG 1 Tablet(s) Oral every 6 hours PRN  acetaminophen   Tablet. 650 milliGRAM(s) Oral every 6 hours PRN  ondansetron Injectable 4 milliGRAM(s) IV Push every 6 hours PRN  dextrose Gel 1 Dose(s) Oral once PRN  glucagon  Injectable 1 milliGRAM(s) IntraMuscular once PRN      REVIEW OF SYSTEMS  --------------------------------------------------------------------------------    Gen: denies fevers/chills,  CVS: denies chest pain/palpitations  Resp: denies SOB/Cough  GI: Denies + N/V/ no diarrhea or Abd pain  All other systems were reviewed and are negative, except as noted.    VITALS/PHYSICAL EXAM  --------------------------------------------------------------------------------  T(C): 36.8 (07-29-17 @ 10:01), Max: 37.1 (07-28-17 @ 21:02)  HR: 66 (07-29-17 @ 10:01) (66 - 87)  BP: 177/71 (07-29-17 @ 10:01) (163/67 - 196/77)  RR: 18 (07-29-17 @ 10:01) (18 - 18)  SpO2: 98% (07-29-17 @ 10:01) (94% - 100%)  Wt(kg): --        07-28-17 @ 07:01  -  07-29-17 @ 07:00  --------------------------------------------------------  IN: 480 mL / OUT: 0 mL / NET: 480 mL      Physical Exam:  	    Physical Exam:  	Gen: alert oriented place person and date   	Pulm: Decreased breath sounds b/l bases. no rales or ronchi or wheezing  	CV: RRR, S1/S2. no rub  	Back: No CVA tenderness; no sacral edema  	Abd: +BS, soft, nontender/nondistended  	: No suprapubic tenderness.               Extremity: No cyanosis, LLE 2- edema no clubbing  	  LABS/STUDIES  --------------------------------------------------------------------------------              8.1    5.23  >-----------<  237      [07-29-17 @ 08:45]              25.6     133  |  93  |  27  ----------------------------<  184      [07-29-17 @ 09:01]  4.7   |  22  |  6.60        Ca     8.1     [07-29-17 @ 09:01]      Mg     2.1     [07-29-17 @ 06:35]      Phos  3.5     [07-29-17 @ 06:35]    TPro  6.4  /  Alb  3.5  /  TBili  0.3  /  DBili  x   /  AST  15  /  ALT  7   /  AlkPhos  219  [07-29-17 @ 09:01]          Creatinine Trend:  SCr 6.60 [07-29 @ 09:01]  SCr 6.67 [07-29 @ 06:35]  SCr 5.37 [07-28 @ 08:40]  SCr 4.30 [07-27 @ 21:10]  SCr 4.03 [07-27 @ 16:51]                  HbA1c 6.8      [07-24-17 @ 06:15]

## 2017-07-29 NOTE — PROGRESS NOTE ADULT - SUBJECTIVE AND OBJECTIVE BOX
Patient is a 62y old  Female who presents with a chief complaint of     SUBJECTIVE / OVERNIGHT EVENTS: feels better, had epigastric discomfort.  Review of Systems  chest pain no  palpitations no  sob no  nausea no  headache no    MEDICATIONS  (STANDING):  DULoxetine 60 milliGRAM(s) Oral daily  atorvastatin 20 milliGRAM(s) Oral at bedtime  clopidogrel Tablet 75 milliGRAM(s) Oral daily  aspirin enteric coated 81 milliGRAM(s) Oral daily  cinacalcet 60 milliGRAM(s) Oral daily  hydrALAZINE 100 milliGRAM(s) Oral every 8 hours  dextrose 50% Injectable 50 milliLiter(s) IV Push every 15 minutes  dextrose 50% Injectable 25 milliLiter(s) IV Push every 15 minutes  senna 2 Tablet(s) Oral at bedtime  docusate sodium 100 milliGRAM(s) Oral three times a day  dextrose 5%. 1000 milliLiter(s) (50 mL/Hr) IV Continuous <Continuous>  sevelamer hydrochloride 2400 milliGRAM(s) Oral three times a day with meals  heparin  Injectable 5000 Unit(s) SubCutaneous every 8 hours  insulin lispro (HumaLOG) Pump 1 Each SubCutaneous Continuous Pump  metoprolol succinate  milliGRAM(s) Oral daily    MEDICATIONS  (PRN):  oxyCODONE    5 mG/acetaminophen 325 mG 1 Tablet(s) Oral every 6 hours PRN Severe Pain (7 - 10)  acetaminophen   Tablet. 650 milliGRAM(s) Oral every 6 hours PRN Mild and/or moderate Pain  ondansetron Injectable 4 milliGRAM(s) IV Push every 6 hours PRN Nausea and/or Vomiting  dextrose Gel 1 Dose(s) Oral once PRN Blood Glucose LESS THAN 70 milliGRAM(s)/deciLiter  glucagon  Injectable 1 milliGRAM(s) IntraMuscular once PRN Glucose <70 milliGRAM(s)/deciLiter          PHYSICAL EXAM:  GENERAL: NAD, well-developed  HEAD:  Atraumatic, Normocephalic  EYES: EOMI, PERRLA, conjunctiva and sclera clear  NECK: Supple, No JVD  CHEST/LUNG: Clear to auscultation bilaterally; No wheeze  HEART: Regular rate and rhythm; No murmurs, rubs, or gallops  ABDOMEN: Soft, Nontender, Nondistended; Bowel sounds present  EXTREMITIES:  2+ Peripheral Pulses, No clubbing, cyanosis, or edema  PSYCH: AAOx3  NEUROLOGY: non-focal  SKIN: No rashes or lesions    LABS:                        8.1    5.23  )-----------( 237      ( 29 Jul 2017 08:45 )             25.6     07-29    133<L>  |  93<L>  |  27<H>  ----------------------------<  184<H>  4.7   |  22  |  6.60<H>    Ca    8.1<L>      29 Jul 2017 09:01  Phos  3.5     07-29  Mg     2.1     07-29    TPro  6.4  /  Alb  3.5  /  TBili  0.3  /  DBili  x   /  AST  15  /  ALT  7<L>  /  AlkPhos  219<H>  07-29              RADIOLOGY & ADDITIONAL TESTS:    Imaging Personally Reviewed:    Consultant(s) Notes Reviewed:      Care Discussed with Consultants/Other Providers:

## 2017-07-29 NOTE — PROGRESS NOTE ADULT - ASSESSMENT
62 f with DKA / diabetes Mellitus type 1  BS control/ endocrine follow. DKA resolvig- restart insulin pump/ bridge with Insulin gtt.  s/p NSTEMI-AC, asa/Plavix. Cardiology follow, pain control Possible catheterization next week.  ESRD- continue HD  Peripheral Vascular disease- conservative management.  Pierce Viera MD pager [phone]

## 2017-07-29 NOTE — PROGRESS NOTE ADULT - ASSESSMENT
62 F CAD, s/p PCI, s/p brachy RCA, d-CHF, asd repair, ESRD admitted with chest pain, hyperglycemia, s/p MICU  for DKA     1. cv stable   no chest pain or sob.    2. CAD/ PCI   continue with ASA/ Plavix/ statin     3. DCHF  volume status improving   volume removal with HD   continue with BB     4. Diabetic ketoacidosis / DM type 1  s/p MICU   med f/u     5. htn   increase Toprol to 100mg daily     6. DVT ppx

## 2017-07-29 NOTE — PROGRESS NOTE ADULT - PROBLEM SELECTOR PLAN 1
- FS now in low 200's  - anion gap 18  - will monitor FS today if continue to remain elevated, will increase basal rate from midnight to 5 am to 0.3 units/hr, and increase rate from 5 am to midnight to 0.4 units/hr. hold bolus insulin if patient not eating  - will follow    Plan discussed with attending

## 2017-07-29 NOTE — PROGRESS NOTE ADULT - SUBJECTIVE AND OBJECTIVE BOX
Coverage for Dr. Vela      CC: No CP/SOB      PHYSICAL EXAM:    T(C): 36.9 (07-29-17 @ 04:42), Max: 37.1 (07-28-17 @ 21:02)  HR: 79 (07-29-17 @ 06:00) (68 - 87)  BP: 163/67 (07-29-17 @ 06:00) (163/67 - 196/77)  RR: 18 (07-29-17 @ 04:42) (18 - 18)  SpO2: 95% (07-29-17 @ 04:42) (94% - 100%)  Wt(kg): --  I&O's Summary    28 Jul 2017 07:01  -  29 Jul 2017 07:00  --------------------------------------------------------  IN: 480 mL / OUT: 0 mL / NET: 480 mL        Appearance: Normal	  Cardiovascular: Normal S1 S2,RRR, No JVD, No murmurs  Respiratory: Lungs clear to auscultation	  Gastrointestinal:  Soft, Non-tender, + BS	  Extremities: Normal range of motion, No clubbing, cyanosis or edema  Vascular: Peripheral pulses palpable 2+ bilaterally     LABS:	 	                          8.4    4.0   )-----------( 202      ( 28 Jul 2017 08:40 )             25.2     07-28    134<L>  |  96  |  20  ----------------------------<  200<H>  4.7   |  25  |  5.37<H>    Ca    7.9<L>      28 Jul 2017 08:40  Phos  3.6     07-28  Mg     2.1     07-28    TPro  6.0  /  Alb  3.5  /  TBili  0.3  /  DBili  x   /  AST  17  /  ALT  11  /  AlkPhos  197<H>  07-28          CARDIAC MARKERS:

## 2017-07-30 LAB
ANION GAP SERPL CALC-SCNC: 15 MMOL/L — SIGNIFICANT CHANGE UP (ref 5–17)
BUN SERPL-MCNC: 12 MG/DL — SIGNIFICANT CHANGE UP (ref 7–23)
CALCIUM SERPL-MCNC: 8.5 MG/DL — SIGNIFICANT CHANGE UP (ref 8.4–10.5)
CHLORIDE SERPL-SCNC: 93 MMOL/L — LOW (ref 96–108)
CO2 SERPL-SCNC: 27 MMOL/L — SIGNIFICANT CHANGE UP (ref 22–31)
CREAT SERPL-MCNC: 4.41 MG/DL — HIGH (ref 0.5–1.3)
GLUCOSE SERPL-MCNC: 203 MG/DL — HIGH (ref 70–99)
HCT VFR BLD CALC: 29.7 % — LOW (ref 34.5–45)
HGB BLD-MCNC: 9.9 G/DL — LOW (ref 11.5–15.5)
MAGNESIUM SERPL-MCNC: 2.2 MG/DL — SIGNIFICANT CHANGE UP (ref 1.6–2.6)
MCHC RBC-ENTMCNC: 33.2 GM/DL — SIGNIFICANT CHANGE UP (ref 32–36)
MCHC RBC-ENTMCNC: 34.4 PG — HIGH (ref 27–34)
MCV RBC AUTO: 104 FL — HIGH (ref 80–100)
PHOSPHATE SERPL-MCNC: 2.5 MG/DL — SIGNIFICANT CHANGE UP (ref 2.5–4.5)
PLATELET # BLD AUTO: 252 K/UL — SIGNIFICANT CHANGE UP (ref 150–400)
POTASSIUM SERPL-MCNC: 4.2 MMOL/L — SIGNIFICANT CHANGE UP (ref 3.5–5.3)
POTASSIUM SERPL-SCNC: 4.2 MMOL/L — SIGNIFICANT CHANGE UP (ref 3.5–5.3)
RBC # BLD: 2.87 M/UL — LOW (ref 3.8–5.2)
RBC # FLD: 12.7 % — SIGNIFICANT CHANGE UP (ref 10.3–14.5)
SODIUM SERPL-SCNC: 135 MMOL/L — SIGNIFICANT CHANGE UP (ref 135–145)
WBC # BLD: 5.2 K/UL — SIGNIFICANT CHANGE UP (ref 3.8–10.5)
WBC # FLD AUTO: 5.2 K/UL — SIGNIFICANT CHANGE UP (ref 3.8–10.5)

## 2017-07-30 RX ADMIN — OXYCODONE AND ACETAMINOPHEN 1 TABLET(S): 5; 325 TABLET ORAL at 22:57

## 2017-07-30 RX ADMIN — Medication 100 MILLIGRAM(S): at 22:37

## 2017-07-30 RX ADMIN — CINACALCET 60 MILLIGRAM(S): 30 TABLET, FILM COATED ORAL at 12:11

## 2017-07-30 RX ADMIN — OXYCODONE AND ACETAMINOPHEN 1 TABLET(S): 5; 325 TABLET ORAL at 05:34

## 2017-07-30 RX ADMIN — Medication 81 MILLIGRAM(S): at 12:11

## 2017-07-30 RX ADMIN — Medication 100 MILLIGRAM(S): at 05:04

## 2017-07-30 RX ADMIN — ATORVASTATIN CALCIUM 20 MILLIGRAM(S): 80 TABLET, FILM COATED ORAL at 22:35

## 2017-07-30 RX ADMIN — CLOPIDOGREL BISULFATE 75 MILLIGRAM(S): 75 TABLET, FILM COATED ORAL at 12:11

## 2017-07-30 RX ADMIN — OXYCODONE AND ACETAMINOPHEN 1 TABLET(S): 5; 325 TABLET ORAL at 16:00

## 2017-07-30 RX ADMIN — SEVELAMER CARBONATE 2400 MILLIGRAM(S): 2400 POWDER, FOR SUSPENSION ORAL at 08:55

## 2017-07-30 RX ADMIN — Medication 100 MILLIGRAM(S): at 15:15

## 2017-07-30 RX ADMIN — SENNA PLUS 2 TABLET(S): 8.6 TABLET ORAL at 22:35

## 2017-07-30 RX ADMIN — OXYCODONE AND ACETAMINOPHEN 1 TABLET(S): 5; 325 TABLET ORAL at 15:15

## 2017-07-30 RX ADMIN — OXYCODONE AND ACETAMINOPHEN 1 TABLET(S): 5; 325 TABLET ORAL at 05:04

## 2017-07-30 RX ADMIN — DULOXETINE HYDROCHLORIDE 60 MILLIGRAM(S): 30 CAPSULE, DELAYED RELEASE ORAL at 12:11

## 2017-07-30 RX ADMIN — Medication 100 MILLIGRAM(S): at 22:35

## 2017-07-30 NOTE — PHYSICAL THERAPY INITIAL EVALUATION ADULT - PERTINENT HX OF CURRENT PROBLEM, REHAB EVAL
Pt is a 62 y.o. female with multiple prior admissions to hospital for c/o hyperglycemia, increasing leg edema, and chest pain w/ last admit 6/30-7/6, p/w mid sternal chest pain and glucose >900. Continued below.

## 2017-07-30 NOTE — PROGRESS NOTE ADULT - SUBJECTIVE AND OBJECTIVE BOX
Chief Complaint: f/u DM1    History:  Overnight - nightime FS 83, patient given juice with appropriate rise in glucose  Patient seen and examined at bedside. Reports loss of appetite. No nausea or vomiting today.    MEDICATIONS  (STANDING):  DULoxetine 60 milliGRAM(s) Oral daily  atorvastatin 20 milliGRAM(s) Oral at bedtime  clopidogrel Tablet 75 milliGRAM(s) Oral daily  aspirin enteric coated 81 milliGRAM(s) Oral daily  cinacalcet 60 milliGRAM(s) Oral daily  hydrALAZINE 100 milliGRAM(s) Oral every 8 hours  dextrose 50% Injectable 50 milliLiter(s) IV Push every 15 minutes  dextrose 50% Injectable 25 milliLiter(s) IV Push every 15 minutes  senna 2 Tablet(s) Oral at bedtime  docusate sodium 100 milliGRAM(s) Oral three times a day  dextrose 5%. 1000 milliLiter(s) (50 mL/Hr) IV Continuous <Continuous>  sevelamer hydrochloride 2400 milliGRAM(s) Oral three times a day with meals  heparin  Injectable 5000 Unit(s) SubCutaneous every 8 hours  insulin lispro (HumaLOG) Pump 1 Each SubCutaneous Continuous Pump  metoprolol succinate  milliGRAM(s) Oral daily    MEDICATIONS  (PRN):  oxyCODONE    5 mG/acetaminophen 325 mG 1 Tablet(s) Oral every 6 hours PRN Severe Pain (7 - 10)  acetaminophen   Tablet. 650 milliGRAM(s) Oral every 6 hours PRN Mild and/or moderate Pain  ondansetron Injectable 4 milliGRAM(s) IV Push every 6 hours PRN Nausea and/or Vomiting  dextrose Gel 1 Dose(s) Oral once PRN Blood Glucose LESS THAN 70 milliGRAM(s)/deciLiter  glucagon  Injectable 1 milliGRAM(s) IntraMuscular once PRN Glucose <70 milliGRAM(s)/deciLiter      Allergies    No Known Allergies    Intolerances      Review of Systems:  Constitutional: No fever  Cardiovascular: No chest pain, palpitations  Respiratory: No SOB, no cough  GI: No nausea, vomiting, abdominal pain  Endocrine: no polyuria, polydipsia      ALL OTHER SYSTEMS REVIEWED AND NEGATIVE      PHYSICAL EXAM:  VITALS: T(C): 36.9 (07-30-17 @ 11:15)  T(F): 98.4 (07-30-17 @ 11:15), Max: 99 (07-29-17 @ 20:05)  HR: 67 (07-30-17 @ 11:15) (65 - 75)  BP: 181/74 (07-30-17 @ 11:15) (162/61 - 189/68)  RR:  (16 - 20)  SpO2:  (95% - 99%)  Wt(kg): --  GENERAL: NAD, well-groomed, well-developed  EYES: No proptosis, no lid lag, anicteric  HEENT:  Atraumatic, Normocephalic, moist mucous membranes  THYROID: Normal size, no palpable nodules  RESPIRATORY: Clear to auscultation bilaterally; No rales, rhonchi, wheezing, or rubs  CARDIOVASCULAR: Regular rate and rhythm; No murmurs; no peripheral edema  GI: Soft, nontender, non distended, normal bowel sounds  SKIN: Dry, intact, No rashes or lesions  MUSCULOSKELETAL: Full range of motion, normal strength  NEURO: sensation intact, extraocular movements intact, no tremor, normal reflexes  PSYCH: Alert and oriented x 3, normal affect, normal mood  CUSHING'S SIGNS: no striae    CAPILLARY BLOOD GLUCOSE  127 (07-30 @ 11:58)  212 (07-30 @ 08:41)  240 (07-30 @ 05:00)  175 (07-30 @ 01:00)  160 (07-29 @ 22:10)  83 (07-29 @ 21:11)  121 (07-29 @ 17:05)  200 (07-29 @ 12:09)  209 (07-29 @ 08:14)  181 (07-29 @ 05:49)  222 (07-29 @ 01:44)  177 (07-28 @ 21:49)  90 (07-28 @ 17:33)  187 (07-28 @ 11:46)  212 (07-28 @ 07:55)  156 (07-28 @ 01:58)  129 (07-27 @ 22:00)  160 (07-27 @ 18:00)  196 (07-27 @ 14:00)  286 (07-27 @ 13:00)      07-30    135  |  93<L>  |  12  ----------------------------<  203<H>  4.2   |  27  |  4.41<H>    EGFR if : 12<L>  EGFR if non : 10<L>    Ca    8.5      07-30  Mg     2.2     07-30  Phos  2.5     07-30    TPro  6.4  /  Alb  3.5  /  TBili  0.3  /  DBili  x   /  AST  15  /  ALT  7<L>  /  AlkPhos  219<H>  07-29          Thyroid Function Tests:      Hemoglobin A1C, Whole Blood: 6.8 % <H> [4.0 - 5.6] (07-24-17 @ 06:15)  Hemoglobin A1C, Whole Blood: 6.8 % <H> [4.0 - 5.6] (07-23-17 @ 22:45)  Hemoglobin A1C, Whole Blood: 7.3 % <H> [4.0 - 5.6] (06-16-17 @ 04:45)

## 2017-07-30 NOTE — PROVIDER CONTACT NOTE (MEDICATION) - ASSESSMENT
Patient A&Ox4, VSS. Patient ambulating around halls with assistance throughout the day. Patient educated on importance of DVT prophylaxis and patient shows understanding. Still refusing at this time.
Patient A&Ox4, all other VSS. Patient c/o pain at this time on her left foot. Patient denies chest pain, pressure, or palpitations.
Patient AxOx4-refusing heparin-educated on the use of heparin
patient is AxOx4; educated on the use of heparin
Patient A&Ox4, VSS. Patient without s/s of hypoglycemia. Patient comfortably in bed at this time.

## 2017-07-30 NOTE — PROGRESS NOTE ADULT - ASSESSMENT
62 F CAD, s/p PCI, s/p brachy RCA, d-CHF, asd repair, ESRD admitted with chest pain/NSTEMI in the setting of DKA s/p MICU     1. cv stable   no chest pain or sob.    2. CAD/ PCI   continue with ASA/ Plavix/ statin       3. DCHF  volume status improving   volume removal with HD   continue with BB     4. Diabetic ketoacidosis / DM type 1  s/p MICU   med f/u     5. htn   increase Toprol to 100mg daily     6. DVT ppx

## 2017-07-30 NOTE — PROGRESS NOTE ADULT - SUBJECTIVE AND OBJECTIVE BOX
Patient is a 62y old  Female who presents with a chief complaint of     SUBJECTIVE / OVERNIGHT EVENTS: Comfortable without new complaints.   Review of Systems  chest pain no  palpitations no  sob no  nausea no  headache no    MEDICATIONS  (STANDING):  DULoxetine 60 milliGRAM(s) Oral daily  atorvastatin 20 milliGRAM(s) Oral at bedtime  clopidogrel Tablet 75 milliGRAM(s) Oral daily  aspirin enteric coated 81 milliGRAM(s) Oral daily  cinacalcet 60 milliGRAM(s) Oral daily  hydrALAZINE 100 milliGRAM(s) Oral every 8 hours  dextrose 50% Injectable 50 milliLiter(s) IV Push every 15 minutes  dextrose 50% Injectable 25 milliLiter(s) IV Push every 15 minutes  senna 2 Tablet(s) Oral at bedtime  docusate sodium 100 milliGRAM(s) Oral three times a day  dextrose 5%. 1000 milliLiter(s) (50 mL/Hr) IV Continuous <Continuous>  sevelamer hydrochloride 2400 milliGRAM(s) Oral three times a day with meals  heparin  Injectable 5000 Unit(s) SubCutaneous every 8 hours  insulin lispro (HumaLOG) Pump 1 Each SubCutaneous Continuous Pump  metoprolol succinate  milliGRAM(s) Oral daily    MEDICATIONS  (PRN):  oxyCODONE    5 mG/acetaminophen 325 mG 1 Tablet(s) Oral every 6 hours PRN Severe Pain (7 - 10)  acetaminophen   Tablet. 650 milliGRAM(s) Oral every 6 hours PRN Mild and/or moderate Pain  ondansetron Injectable 4 milliGRAM(s) IV Push every 6 hours PRN Nausea and/or Vomiting  dextrose Gel 1 Dose(s) Oral once PRN Blood Glucose LESS THAN 70 milliGRAM(s)/deciLiter  glucagon  Injectable 1 milliGRAM(s) IntraMuscular once PRN Glucose <70 milliGRAM(s)/deciLiter          PHYSICAL EXAM:  GENERAL: NAD, well-developed  HEAD:  Atraumatic, Normocephalic  EYES: EOMI, PERRLA, conjunctiva and sclera clear  NECK: Supple, No JVD  CHEST/LUNG: Clear to auscultation bilaterally; No wheeze  HEART: Regular rate and rhythm; No murmurs, rubs, or gallops  ABDOMEN: Soft, Nontender, Nondistended; Bowel sounds present  EXTREMITIES:  2+ Peripheral Pulses, No clubbing, cyanosis, or edema  PSYCH: AAOx3  NEUROLOGY: non-focal  SKIN: No rashes or lesions    LABS:                        9.9    5.2   )-----------( 252      ( 30 Jul 2017 09:34 )             29.7     07-30    135  |  93<L>  |  12  ----------------------------<  203<H>  4.2   |  27  |  4.41<H>    Ca    8.5      30 Jul 2017 09:34  Phos  2.5     07-30  Mg     2.2     07-30    TPro  6.4  /  Alb  3.5  /  TBili  0.3  /  DBili  x   /  AST  15  /  ALT  7<L>  /  AlkPhos  219<H>  07-29              RADIOLOGY & ADDITIONAL TESTS:    Imaging Personally Reviewed:    Consultant(s) Notes Reviewed:      Care Discussed with Consultants/Other Providers:

## 2017-07-30 NOTE — PROVIDER CONTACT NOTE (MEDICATION) - ACTION/TREATMENT ORDERED:
NP Shawn aware at this time. Ordered to give patient apple juice x2 cups and recheck FS in one hour. Call bell within easy reach. Safety maintained.
SARAH Arvizu made aware; no further actions at this time.
SARAH Joel made aware; no further actions at this time.
NP Shawn aware. Percocet given and BP reassessed after 30 mins, repeat /69. No further intervention at this time. Call bell within easy reach. Safety maintained.
NP Shawn aware at this time. Ordered to promote ambulation. Call bell within easy reach. Patient safety maintained.

## 2017-07-30 NOTE — PROVIDER CONTACT NOTE (MEDICATION) - SITUATION
Patient refused heparin for afternoon.
Patient refused heparin.
Patient refusing SQ Heparin
Patient with /77 electronically c/o 8/10 pain on her left foot
Patient FS 83

## 2017-07-30 NOTE — PROVIDER CONTACT NOTE (MEDICATION) - BACKGROUND
Patient admitted for DMT1 with ketoacidosis without coma
Patient admitted for type 1 diabetes mellitus with ketoacidosis without coma
Patient admitted for T1DM with ketoacidosis without coma
Patient admitted to [facility] on 7/23/17 with DKA.
Patient admitted to [facility] on 7/23/17 with DKA.

## 2017-07-30 NOTE — PROVIDER CONTACT NOTE (MEDICATION) - RECOMMENDATIONS
Make provider aware.
will continue to monitor.
will continue to monitor.
Make provider aware, promote ambulation
Make NP aware. Pain medication.

## 2017-07-30 NOTE — PROGRESS NOTE ADULT - ASSESSMENT
63 yo female with hx of T1DM controlled with pvd s/p fem-pop b/l, retinopathy, CAD, ESRD on HD, and neuropathy here with DKA and NSTEMI. Had mild DKA on 7/26, received HD then insulin gtt, then gap closed on morning of 7/27 and pump reattached.  BG very labile.

## 2017-07-30 NOTE — PROGRESS NOTE ADULT - SUBJECTIVE AND OBJECTIVE BOX
Coverage for Dane Norwood    CC: no cp/sob    TELEMETRY:     PHYSICAL EXAM:    T(C): 36.7 (07-30-17 @ 05:01), Max: 37.2 (07-29-17 @ 16:30)  HR: 68 (07-30-17 @ 05:56) (65 - 75)  BP: 164/70 (07-30-17 @ 05:56) (162/61 - 189/68)  RR: 18 (07-30-17 @ 05:01) (16 - 18)  SpO2: 97% (07-30-17 @ 05:01) (95% - 98%)  Wt(kg): --  I&O's Summary    29 Jul 2017 07:01  -  30 Jul 2017 07:00  --------------------------------------------------------  IN: 1860 mL / OUT: 2900 mL / NET: -1040 mL        Appearance: Normal	  Cardiovascular: Normal S1 S2,RRR, No JVD, No murmurs  Respiratory: Lungs clear to auscultation	  Gastrointestinal:  Soft, Non-tender, + BS	  Extremities: Normal range of motion, No clubbing, cyanosis or edema  Vascular: Peripheral pulses palpable 2+ bilaterally     LABS:	 	                          8.1    5.23  )-----------( 237      ( 29 Jul 2017 08:45 )             25.6     07-29    133<L>  |  93<L>  |  27<H>  ----------------------------<  184<H>  4.7   |  22  |  6.60<H>    Ca    8.1<L>      29 Jul 2017 09:01  Phos  3.5     07-29  Mg     2.1     07-29    TPro  6.4  /  Alb  3.5  /  TBili  0.3  /  DBili  x   /  AST  15  /  ALT  7<L>  /  AlkPhos  219<H>  07-29          CARDIAC MARKERS:

## 2017-07-30 NOTE — PHYSICAL THERAPY INITIAL EVALUATION ADULT - ADDITIONAL COMMENTS
+CXR 7/23/17: Redemonstration of the vascular stent in the left upper thorax and atrial septal defect closure device. Calcifications of the aorta and coronary arteries. The lungs are clear. There is resolution of prior pulmonary vascular congestion. Mild cardiomegaly.  +Trans Echo 7/24/17: Mitral annular calcification.  Mild mitral regurgitation. Calcified trileaflet aortic valve with decreased opening. Mild-moderate tricuspid regurgitation.    Pt states she lives with her  in a private home with 3 steps to enter and 12 steps inside, +HR. Pt states she uses a cane to ambulate. Pt states  is available to assist when needed.

## 2017-07-30 NOTE — PROGRESS NOTE ADULT - PROBLEM SELECTOR PLAN 1
- FS reviewed - labile - occasionally in 80's, other times in low 200's - it appears that patient has lower FS in the evening and higher AM FS. Will monitor today's FS and will consider decreasing evening basal rate while increasing AM basal rate.   - hold bolus insulin if patient not eating  - will follow

## 2017-07-30 NOTE — PROGRESS NOTE ADULT - ASSESSMENT
62 f with DKA / diabetes Mellitus type 1  BS control/ endocrine follow. DKA resolvig- restart insulin pump/ bridge with Insulin gtt.  s/p NSTEMI-AC, asa/Plavix. Cardiology follow, pain control Possible catheterization next week.  ESRD- continue HD  Peripheral Vascular disease- conservative management.  Pierce Viera MD pager [phone]

## 2017-07-31 DIAGNOSIS — Z96.41 PRESENCE OF INSULIN PUMP (EXTERNAL) (INTERNAL): ICD-10-CM

## 2017-07-31 LAB
ALBUMIN SERPL ELPH-MCNC: 3.5 G/DL — SIGNIFICANT CHANGE UP (ref 3.3–5)
ALP SERPL-CCNC: 213 U/L — HIGH (ref 40–120)
ALT FLD-CCNC: 9 U/L — LOW (ref 10–45)
ANION GAP SERPL CALC-SCNC: 17 MMOL/L — SIGNIFICANT CHANGE UP (ref 5–17)
AST SERPL-CCNC: 19 U/L — SIGNIFICANT CHANGE UP (ref 10–40)
BILIRUB SERPL-MCNC: 0.3 MG/DL — SIGNIFICANT CHANGE UP (ref 0.2–1.2)
BUN SERPL-MCNC: 16 MG/DL — SIGNIFICANT CHANGE UP (ref 7–23)
CALCIUM SERPL-MCNC: 8.6 MG/DL — SIGNIFICANT CHANGE UP (ref 8.4–10.5)
CHLORIDE SERPL-SCNC: 95 MMOL/L — LOW (ref 96–108)
CO2 SERPL-SCNC: 23 MMOL/L — SIGNIFICANT CHANGE UP (ref 22–31)
CREAT SERPL-MCNC: 5.95 MG/DL — HIGH (ref 0.5–1.3)
GLUCOSE SERPL-MCNC: 101 MG/DL — HIGH (ref 70–99)
HCT VFR BLD CALC: 26.9 % — LOW (ref 34.5–45)
HGB BLD-MCNC: 8.3 G/DL — LOW (ref 11.5–15.5)
MCHC RBC-ENTMCNC: 30.9 GM/DL — LOW (ref 32–36)
MCHC RBC-ENTMCNC: 31.4 PG — SIGNIFICANT CHANGE UP (ref 27–34)
MCV RBC AUTO: 101.9 FL — HIGH (ref 80–100)
PLATELET # BLD AUTO: 284 K/UL — SIGNIFICANT CHANGE UP (ref 150–400)
POTASSIUM SERPL-MCNC: 4.3 MMOL/L — SIGNIFICANT CHANGE UP (ref 3.5–5.3)
POTASSIUM SERPL-SCNC: 4.3 MMOL/L — SIGNIFICANT CHANGE UP (ref 3.5–5.3)
PROT SERPL-MCNC: 6.3 G/DL — SIGNIFICANT CHANGE UP (ref 6–8.3)
RBC # BLD: 2.64 M/UL — LOW (ref 3.8–5.2)
RBC # FLD: 13.9 % — SIGNIFICANT CHANGE UP (ref 10.3–14.5)
SODIUM SERPL-SCNC: 135 MMOL/L — SIGNIFICANT CHANGE UP (ref 135–145)
WBC # BLD: 3.93 K/UL — SIGNIFICANT CHANGE UP (ref 3.8–10.5)
WBC # FLD AUTO: 3.93 K/UL — SIGNIFICANT CHANGE UP (ref 3.8–10.5)

## 2017-07-31 PROCEDURE — 99232 SBSQ HOSP IP/OBS MODERATE 35: CPT

## 2017-07-31 RX ORDER — OXYCODONE AND ACETAMINOPHEN 5; 325 MG/1; MG/1
1 TABLET ORAL ONCE
Qty: 0 | Refills: 0 | Status: DISCONTINUED | OUTPATIENT
Start: 2017-07-31 | End: 2017-07-31

## 2017-07-31 RX ORDER — MORPHINE SULFATE 50 MG/1
2 CAPSULE, EXTENDED RELEASE ORAL ONCE
Qty: 0 | Refills: 0 | Status: DISCONTINUED | OUTPATIENT
Start: 2017-07-31 | End: 2017-07-31

## 2017-07-31 RX ADMIN — OXYCODONE AND ACETAMINOPHEN 1 TABLET(S): 5; 325 TABLET ORAL at 19:20

## 2017-07-31 RX ADMIN — Medication 100 MILLIGRAM(S): at 21:16

## 2017-07-31 RX ADMIN — OXYCODONE AND ACETAMINOPHEN 1 TABLET(S): 5; 325 TABLET ORAL at 18:45

## 2017-07-31 RX ADMIN — Medication 81 MILLIGRAM(S): at 12:46

## 2017-07-31 RX ADMIN — MORPHINE SULFATE 2 MILLIGRAM(S): 50 CAPSULE, EXTENDED RELEASE ORAL at 23:13

## 2017-07-31 RX ADMIN — DULOXETINE HYDROCHLORIDE 60 MILLIGRAM(S): 30 CAPSULE, DELAYED RELEASE ORAL at 12:47

## 2017-07-31 RX ADMIN — Medication 100 MILLIGRAM(S): at 05:44

## 2017-07-31 RX ADMIN — SENNA PLUS 2 TABLET(S): 8.6 TABLET ORAL at 21:16

## 2017-07-31 RX ADMIN — SEVELAMER CARBONATE 2400 MILLIGRAM(S): 2400 POWDER, FOR SUSPENSION ORAL at 08:36

## 2017-07-31 RX ADMIN — CINACALCET 60 MILLIGRAM(S): 30 TABLET, FILM COATED ORAL at 12:46

## 2017-07-31 RX ADMIN — Medication 100 MILLIGRAM(S): at 13:27

## 2017-07-31 RX ADMIN — MORPHINE SULFATE 2 MILLIGRAM(S): 50 CAPSULE, EXTENDED RELEASE ORAL at 23:45

## 2017-07-31 RX ADMIN — CLOPIDOGREL BISULFATE 75 MILLIGRAM(S): 75 TABLET, FILM COATED ORAL at 12:46

## 2017-07-31 RX ADMIN — Medication 100 MILLIGRAM(S): at 13:26

## 2017-07-31 RX ADMIN — OXYCODONE AND ACETAMINOPHEN 1 TABLET(S): 5; 325 TABLET ORAL at 04:09

## 2017-07-31 RX ADMIN — ATORVASTATIN CALCIUM 20 MILLIGRAM(S): 80 TABLET, FILM COATED ORAL at 21:16

## 2017-07-31 RX ADMIN — SEVELAMER CARBONATE 2400 MILLIGRAM(S): 2400 POWDER, FOR SUSPENSION ORAL at 17:31

## 2017-07-31 RX ADMIN — OXYCODONE AND ACETAMINOPHEN 1 TABLET(S): 5; 325 TABLET ORAL at 22:27

## 2017-07-31 RX ADMIN — OXYCODONE AND ACETAMINOPHEN 1 TABLET(S): 5; 325 TABLET ORAL at 03:39

## 2017-07-31 RX ADMIN — SEVELAMER CARBONATE 2400 MILLIGRAM(S): 2400 POWDER, FOR SUSPENSION ORAL at 12:46

## 2017-07-31 NOTE — PROGRESS NOTE ADULT - PROBLEM SELECTOR PLAN 1
-labile sugars although std deviation has decreased over weekend; no changes to pump today.  I did advise pt that we may want to reduce just slighlty her MN basal but she declined this recommendation today.  Will continue to watch closely her 2 am sugars.   - hold bolus insulin if patient not eating  - will follow

## 2017-07-31 NOTE — CHART NOTE - NSCHARTNOTEFT_GEN_A_CORE
Endorsed by day NP to inform pt. to decrease Insulin Pump settings to 70% in order to prepare pt. for NPO status for catheter in AM. Pt. was notified but explained that she didn't know how to adjust the settings. Spoke to endocrine fellow on call Dr. Marie. Fellow walked me through changing insulin settings to 70%. As per Fellow, will check fingerstick status at 3AM and will bolus as needed. Endocrine to be paged in in AM. Will endorse to day team. Will continue to monitor pt. F/u w/ primary team in AM.     -Madhu Bragg PA-C. #[id number]

## 2017-07-31 NOTE — PROGRESS NOTE ADULT - SUBJECTIVE AND OBJECTIVE BOX
Chief Complaint: type 1 diabetes    Interval history: Altogether suagrs much improved over weekend.  Did have one sugar to 85 last night, but no lows <70 and minimal highs.  No acute sx but not eating much.     MEDICATIONS  (STANDING):  DULoxetine 60 milliGRAM(s) Oral daily  atorvastatin 20 milliGRAM(s) Oral at bedtime  clopidogrel Tablet 75 milliGRAM(s) Oral daily  aspirin enteric coated 81 milliGRAM(s) Oral daily  cinacalcet 60 milliGRAM(s) Oral daily  hydrALAZINE 100 milliGRAM(s) Oral every 8 hours  dextrose 50% Injectable 50 milliLiter(s) IV Push every 15 minutes  dextrose 50% Injectable 25 milliLiter(s) IV Push every 15 minutes  senna 2 Tablet(s) Oral at bedtime  docusate sodium 100 milliGRAM(s) Oral three times a day  dextrose 5%. 1000 milliLiter(s) (50 mL/Hr) IV Continuous <Continuous>  sevelamer hydrochloride 2400 milliGRAM(s) Oral three times a day with meals  heparin  Injectable 5000 Unit(s) SubCutaneous every 8 hours  insulin lispro (HumaLOG) Pump 1 Each SubCutaneous Continuous Pump  metoprolol succinate  milliGRAM(s) Oral daily    MEDICATIONS  (PRN):  oxyCODONE    5 mG/acetaminophen 325 mG 1 Tablet(s) Oral every 6 hours PRN Severe Pain (7 - 10)  acetaminophen   Tablet. 650 milliGRAM(s) Oral every 6 hours PRN Mild and/or moderate Pain  ondansetron Injectable 4 milliGRAM(s) IV Push every 6 hours PRN Nausea and/or Vomiting  dextrose Gel 1 Dose(s) Oral once PRN Blood Glucose LESS THAN 70 milliGRAM(s)/deciLiter  glucagon  Injectable 1 milliGRAM(s) IntraMuscular once PRN Glucose <70 milliGRAM(s)/deciLiter      Allergies    No Known Allergies    Intolerances      Review of Systems:  Endocrine: no polyuria, polydipsia    ALL OTHER SYSTEMS REVIEWED AND NEGATIVE      PHYSICAL EXAM:  VITALS: T(C): 36.6 (07-31-17 @ 12:53)  T(F): 97.9 (07-31-17 @ 12:53), Max: 98.8 (07-31-17 @ 04:48)  HR: 64 (07-31-17 @ 12:53) (64 - 71)  BP: 161/71 (07-31-17 @ 12:53) (135/70 - 169/68)  RR:  (18 - 19)  SpO2:  (97% - 99%)  Wt(kg): --  GENERAL: NAD, well-groomed, well-developed  SKIN: Dry, intact, No rashes or lesions  PSYCH: Alert and oriented x 3, normal affect, normal mood    CAPILLARY BLOOD GLUCOSE  179 (07-31 @ 13:16)  113 (07-31 @ 09:22)  90 (07-31 @ 07:49)  116 (07-31 @ 05:02)  89 (07-31 @ 01:15)  152 (07-30 @ 22:17)  119 (07-30 @ 16:22)  127 (07-30 @ 11:58)  212 (07-30 @ 08:41)  240 (07-30 @ 05:00)  175 (07-30 @ 01:00)  160 (07-29 @ 22:10)  83 (07-29 @ 21:11)  121 (07-29 @ 17:05)  200 (07-29 @ 12:09)  209 (07-29 @ 08:14)  181 (07-29 @ 05:49)  222 (07-29 @ 01:44)  177 (07-28 @ 21:49)  90 (07-28 @ 17:33)      07-31    135  |  95<L>  |  16  ----------------------------<  101<H>  4.3   |  23  |  5.95<H>    EGFR if : 8<L>  EGFR if non : 7<L>    Ca    8.6      07-31  Mg     2.2     07-30  Phos  2.5     07-30    TPro  6.3  /  Alb  3.5  /  TBili  0.3  /  DBili  x   /  AST  19  /  ALT  9<L>  /  AlkPhos  213<H>  07-31          Thyroid Function Tests:      Hemoglobin A1C, Whole Blood: 6.8 % <H> [4.0 - 5.6] (07-24-17 @ 06:15)  Hemoglobin A1C, Whole Blood: 6.8 % <H> [4.0 - 5.6] (07-23-17 @ 22:45)  Hemoglobin A1C, Whole Blood: 7.3 % <H> [4.0 - 5.6] (06-16-17 @ 04:45)

## 2017-07-31 NOTE — PROGRESS NOTE ADULT - SUBJECTIVE AND OBJECTIVE BOX
CARDIOLOGY FOLLOW UP     CC no chest pain or sob       PHYSICAL EXAM:  T(C): 36.6 (07-31-17 @ 12:53), Max: 37.1 (07-30-17 @ 17:22)  HR: 64 (07-31-17 @ 12:53) (64 - 67)  BP: 161/71 (07-31-17 @ 12:53) (135/70 - 169/68)  RR: 18 (07-31-17 @ 12:53) (18 - 19)  SpO2: 97% (07-31-17 @ 12:53) (97% - 99%)  Wt(kg): --  I&O's Summary    30 Jul 2017 07:01  -  31 Jul 2017 07:00  --------------------------------------------------------  IN: 840 mL / OUT: 0 mL / NET: 840 mL        Appearance: Normal	  Cardiovascular: Normal S1 S2,RRR, No JVD,+ sys murmurs  Respiratory: Lungs clear to auscultation	  Gastrointestinal:  Soft, Non-tender, + BS	  Extremities: Normal range of motion, No clubbing, cyanosis or edema        MEDICATIONS  (STANDING):  DULoxetine 60 milliGRAM(s) Oral daily  atorvastatin 20 milliGRAM(s) Oral at bedtime  clopidogrel Tablet 75 milliGRAM(s) Oral daily  aspirin enteric coated 81 milliGRAM(s) Oral daily  cinacalcet 60 milliGRAM(s) Oral daily  hydrALAZINE 100 milliGRAM(s) Oral every 8 hours  dextrose 50% Injectable 50 milliLiter(s) IV Push every 15 minutes  dextrose 50% Injectable 25 milliLiter(s) IV Push every 15 minutes  senna 2 Tablet(s) Oral at bedtime  docusate sodium 100 milliGRAM(s) Oral three times a day  dextrose 5%. 1000 milliLiter(s) (50 mL/Hr) IV Continuous <Continuous>  sevelamer hydrochloride 2400 milliGRAM(s) Oral three times a day with meals  heparin  Injectable 5000 Unit(s) SubCutaneous every 8 hours  insulin lispro (HumaLOG) Pump 1 Each SubCutaneous Continuous Pump  metoprolol succinate  milliGRAM(s) Oral daily        OTHER: 	    LABS:	 	                                8.3    3.93  )-----------( 284      ( 31 Jul 2017 07:09 )             26.9     07-31    135  |  95<L>  |  16  ----------------------------<  101<H>  4.3   |  23  |  5.95<H>    Ca    8.6      31 Jul 2017 07:09  Phos  2.5     07-30  Mg     2.2     07-30    TPro  6.3  /  Alb  3.5  /  TBili  0.3  /  DBili  x   /  AST  19  /  ALT  9<L>  /  AlkPhos  213<H>  07-31

## 2017-07-31 NOTE — PROGRESS NOTE ADULT - ATTENDING COMMENTS
Mrs. Cui is no longer in DKA.  She is hungry, not due for HD until tomorrow.  Will start diet so that we can stop IV fluids.  Continue IV insulin until she is ready to transition back to her insulin pump.  Agree with current management.
Patient seen and examined, agree with the above assessment and plan by SARAH Cook.  CV stable  cont current meds  volume removal w HD  No CP
Patient seen and examined, agree with the above assessment and plan by SARAH Cook.  CV stable  cont current meds  volume removal w HD  No CP
Patient seen and examined.  Agree with above NP note.      cv stable  recurrent chest pain/nstemi  plan for cath jessica (DR CAR)  to r/o repeat RCA restenosis  d/w  and pt  r/b/a of cath discussed  they agree  cont asa/plavix
62 F PMHx DM1 (insulin pump), CAD, PCI, PVD, s/p fem-pop bypass, left subclavian stent, ESRD on HD, CVA admitted with DKA.     -stable  -DKA resolved  -restarted on insulin pump, d/c iv insulin  -HD per renal  -continue aspirin, plavix  -continue BP meds  -OOB  -tolerating diet  -endocrine f/u  -stable for floor
Mrs. Cui remains on IV insulin as glucoses are elevated.  AG has closed.  Troponins are decreasing.  She is on IV heparin for ischemia.  She appears well and is HD stable.  Will convert to insulin pump once glucose is below 200.      Continue MICU observation while on Iv continuous insulin.

## 2017-07-31 NOTE — PROGRESS NOTE ADULT - ASSESSMENT
62 f with DKA / diabetes Mellitus type 1  BS control/ endocrine follow. DKA resolvig- restart insulin pump/ bridge with Insulin gtt.  s/p NSTEMI-AC, asa/Plavix. Cardiology follow, pain control Possible catheterization / to clarify with cardiology.  ESRD- continue HD  Peripheral Vascular disease- conservative management.  Pierce Viera MD pager [phone]

## 2017-07-31 NOTE — PROVIDER CONTACT NOTE (OTHER) - SITUATION
Pt hypertensive with BP of 183/ 70, HR 65.   Pt resting comfortably on bed with no c/o of head ache, chest pain, or SOB.

## 2017-07-31 NOTE — PROGRESS NOTE ADULT - SUBJECTIVE AND OBJECTIVE BOX
Patient is a 62y old  Female who presents with a chief complaint of     SUBJECTIVE / OVERNIGHT EVENTS: Comfortable without new complaints.   Review of Systems  chest pain no  palpitations no  sob no  nausea no  headache no    MEDICATIONS  (STANDING):  DULoxetine 60 milliGRAM(s) Oral daily  atorvastatin 20 milliGRAM(s) Oral at bedtime  clopidogrel Tablet 75 milliGRAM(s) Oral daily  aspirin enteric coated 81 milliGRAM(s) Oral daily  cinacalcet 60 milliGRAM(s) Oral daily  hydrALAZINE 100 milliGRAM(s) Oral every 8 hours  dextrose 50% Injectable 50 milliLiter(s) IV Push every 15 minutes  dextrose 50% Injectable 25 milliLiter(s) IV Push every 15 minutes  senna 2 Tablet(s) Oral at bedtime  docusate sodium 100 milliGRAM(s) Oral three times a day  dextrose 5%. 1000 milliLiter(s) (50 mL/Hr) IV Continuous <Continuous>  sevelamer hydrochloride 2400 milliGRAM(s) Oral three times a day with meals  heparin  Injectable 5000 Unit(s) SubCutaneous every 8 hours  insulin lispro (HumaLOG) Pump 1 Each SubCutaneous Continuous Pump  metoprolol succinate  milliGRAM(s) Oral daily    MEDICATIONS  (PRN):  oxyCODONE    5 mG/acetaminophen 325 mG 1 Tablet(s) Oral every 6 hours PRN Severe Pain (7 - 10)  acetaminophen   Tablet. 650 milliGRAM(s) Oral every 6 hours PRN Mild and/or moderate Pain  ondansetron Injectable 4 milliGRAM(s) IV Push every 6 hours PRN Nausea and/or Vomiting  dextrose Gel 1 Dose(s) Oral once PRN Blood Glucose LESS THAN 70 milliGRAM(s)/deciLiter  glucagon  Injectable 1 milliGRAM(s) IntraMuscular once PRN Glucose <70 milliGRAM(s)/deciLiter          PHYSICAL EXAM:  GENERAL: NAD, well-developed  HEAD:  Atraumatic, Normocephalic  EYES: EOMI, PERRLA, conjunctiva and sclera clear  NECK: Supple, No JVD  CHEST/LUNG: Clear to auscultation bilaterally; No wheeze  HEART: Regular rate and rhythm; No murmurs, rubs, or gallops  ABDOMEN: Soft, Nontender, Nondistended; Bowel sounds present  EXTREMITIES:  2+ Peripheral Pulses, No clubbing, cyanosis, or edema  PSYCH: AAOx3  NEUROLOGY: non-focal  SKIN: No rashes or lesions    LABS:                        8.3    3.93  )-----------( 284      ( 31 Jul 2017 07:09 )             26.9     07-31    135  |  95<L>  |  16  ----------------------------<  101<H>  4.3   |  23  |  5.95<H>    Ca    8.6      31 Jul 2017 07:09  Phos  2.5     07-30  Mg     2.2     07-30    TPro  6.3  /  Alb  3.5  /  TBili  0.3  /  DBili  x   /  AST  19  /  ALT  9<L>  /  AlkPhos  213<H>  07-31              RADIOLOGY & ADDITIONAL TESTS:    Imaging Personally Reviewed:    Consultant(s) Notes Reviewed:      Care Discussed with Consultants/Other Providers:

## 2017-07-31 NOTE — PROGRESS NOTE ADULT - ASSESSMENT
60 yo F with DM, ESRD. PVD with recurrent DKA/hyperlgycemia. has hx of LLE bypass as well  1- DM/DKA  2- ESRD  3- recent hyperkalemia on admission   4- NSTEMI  5- htn     insulin to cont  asa/plavix   hd am  lipitor  to cont   hydralazine 100mg tid.   sensipar 60mg qd

## 2017-07-31 NOTE — PROGRESS NOTE ADULT - SUBJECTIVE AND OBJECTIVE BOX
[facility]   [phone]  RENAL FOLLOW UP NOTE  --------------------------------------------------------------------------------  Chief Complaint:    24 hour events/subjective:    still fatigue but states overall feels better than past two days        PAST HISTORY  --------------------------------------------------------------------------------  No significant changes to PMH, PSH, FHx, SHx, unless otherwise noted    ALLERGIES & MEDICATIONS  --------------------------------------------------------------------------------  Allergies    No Known Allergies    Intolerances      Standing Inpatient Medications  DULoxetine 60 milliGRAM(s) Oral daily  atorvastatin 20 milliGRAM(s) Oral at bedtime  clopidogrel Tablet 75 milliGRAM(s) Oral daily  aspirin enteric coated 81 milliGRAM(s) Oral daily  cinacalcet 60 milliGRAM(s) Oral daily  hydrALAZINE 100 milliGRAM(s) Oral every 8 hours  dextrose 50% Injectable 50 milliLiter(s) IV Push every 15 minutes  dextrose 50% Injectable 25 milliLiter(s) IV Push every 15 minutes  senna 2 Tablet(s) Oral at bedtime  docusate sodium 100 milliGRAM(s) Oral three times a day  dextrose 5%. 1000 milliLiter(s) IV Continuous <Continuous>  sevelamer hydrochloride 2400 milliGRAM(s) Oral three times a day with meals  heparin  Injectable 5000 Unit(s) SubCutaneous every 8 hours  insulin lispro (HumaLOG) Pump 1 Each SubCutaneous Continuous Pump  metoprolol succinate  milliGRAM(s) Oral daily    PRN Inpatient Medications  oxyCODONE    5 mG/acetaminophen 325 mG 1 Tablet(s) Oral every 6 hours PRN  acetaminophen   Tablet. 650 milliGRAM(s) Oral every 6 hours PRN  ondansetron Injectable 4 milliGRAM(s) IV Push every 6 hours PRN  dextrose Gel 1 Dose(s) Oral once PRN  glucagon  Injectable 1 milliGRAM(s) IntraMuscular once PRN      REVIEW OF SYSTEMS  --------------------------------------------------------------------------------    Gen: denies fevers/chills,  CVS: denies chest pain/palpitations  Resp: denies  worsening SOB/Cough  GI: Denies N/V/Abd pain  All other systems were reviewed and are negative, except as noted.    VITALS/PHYSICAL EXAM  --------------------------------------------------------------------------------  T(C): 37.1 (07-31-17 @ 04:48), Max: 37.1 (07-30-17 @ 17:22)  HR: 66 (07-31-17 @ 04:48) (66 - 71)  BP: 151/63 (07-31-17 @ 04:48) (135/70 - 169/68)  RR: 18 (07-31-17 @ 04:48) (18 - 19)  SpO2: 97% (07-31-17 @ 04:48) (97% - 99%)  Wt(kg): --        07-30-17 @ 07:01  -  07-31-17 @ 07:00  --------------------------------------------------------  IN: 840 mL / OUT: 0 mL / NET: 840 mL      Physical Exam:  	    Physical Exam:  	Gen: alert oriented place person and date   	Pulm: Decreased breath sounds b/l bases. no rales or ronchi or wheezing  	CV: RRR, S1/S2. no rub  	Back: No CVA tenderness; no sacral edema  	Abd: +BS, soft, nontender/nondistended  	: No suprapubic tenderness.               Extremity: No cyanosis,LLE trace  edema no clubbing  	  LABS/STUDIES  --------------------------------------------------------------------------------              8.3    3.93  >-----------<  284      [07-31-17 @ 07:09]              26.9     135  |  95  |  16  ----------------------------<  101      [07-31-17 @ 07:09]  4.3   |  23  |  5.95        Ca     8.6     [07-31-17 @ 07:09]      Mg     2.2     [07-30-17 @ 09:34]      Phos  2.5     [07-30-17 @ 09:34]    TPro  6.3  /  Alb  3.5  /  TBili  0.3  /  DBili  x   /  AST  19  /  ALT  9   /  AlkPhos  213  [07-31-17 @ 07:09]          Creatinine Trend:  SCr 5.95 [07-31 @ 07:09]  SCr 4.41 [07-30 @ 09:34]  SCr 6.60 [07-29 @ 09:01]  SCr 6.67 [07-29 @ 06:35]  SCr 5.37 [07-28 @ 08:40]                  HbA1c 6.8      [07-24-17 @ 06:15]

## 2017-07-31 NOTE — PROGRESS NOTE ADULT - ASSESSMENT
62 F CAD, s/p PCI, s/p brachy RCA, d-CHF, asd repair, ESRD admitted with chest pain/NSTEMI in the setting of DKA s/p MICU     1. cv stable   NStemi  currently no chest pain or sob.  continue with ASA/pavix/ BB /statin   plan for possible cath     2. CAD/ PCI   continue with ASA/ Plavix/ statin     3. DCHF  volume status ok   volume removal with HD   continue with BB     4. Diabetic ketoacidosis / DM type 1  s/p MICU   med f/u     5. htn   elevated bp reading noted prior to schedule hydralazine   trend BP  continue with current anti- htn meds     6. DVT ppx

## 2017-07-31 NOTE — CHART NOTE - NSCHARTNOTEFT_GEN_A_CORE
Notified by RN; pt. is complaining of tooth pain. Pt. reports that she has had a chipped tooth for a while but the pain is overwhelming at this point. Pain is radiated "11/10" and does not radiate anywhere. Denies provoking/alleviating factors. Pt. is requesting to see dental. Pt. given oxycodone x 1. Will endorse to day team to page Dental in AM if pain persists. Will continue to monitor pt. F/u w/ primary team in AM.    -Madhu Bragg PA-C. #[id number] Notified by RN; pt. is complaining of tooth pain. Pt. reports that she has had a chipped tooth for a while but the pain is overwhelming at this point. Pain is radiated "11/10" and does not radiate anywhere. Denies provoking/alleviating factors. Pt. is requesting to see dental. Pt. given oxycodone x 1 with little relief. Pt. given IV morphine 2 mg x 1. Will endorse to day team to page Dental in AM if pain persists. Pt.'s blood pressure also has been increased likely secondary to pain. Will first manage pain and then treat BP if blood pressure is still high s/p pain control. Will continue to monitor pt. F/u w/ primary team in AM.    -Madhu Bragg PA-C. #[id number]

## 2017-07-31 NOTE — CHART NOTE - NSCHARTNOTEFT_GEN_A_CORE
pt is having cardiac cath tomorrow as per Dr. Grullon ( cardiology). Pt is on the list for cath but no scheduled time as per cath lab. schedule to be checked in am. Pt to be NPO after MN since we have no schedule. Spoke with Endo Dr. Marie. pt to decreased basal dose to 70% when NPO tonight and call Endo fellow in am for further instruction.  Night NP aware who will give instruction to pt.    Kasey Joel NP-C  #[id number]

## 2017-08-01 LAB
ANION GAP SERPL CALC-SCNC: 20 MMOL/L — HIGH (ref 5–17)
BUN SERPL-MCNC: 25 MG/DL — HIGH (ref 7–23)
CALCIUM SERPL-MCNC: 8.4 MG/DL — SIGNIFICANT CHANGE UP (ref 8.4–10.5)
CHLORIDE SERPL-SCNC: 94 MMOL/L — LOW (ref 96–108)
CO2 SERPL-SCNC: 21 MMOL/L — LOW (ref 22–31)
CREAT SERPL-MCNC: 8.01 MG/DL — HIGH (ref 0.5–1.3)
GLUCOSE SERPL-MCNC: 177 MG/DL — HIGH (ref 70–99)
HCT VFR BLD CALC: 27.9 % — LOW (ref 34.5–45)
HGB BLD-MCNC: 9.2 G/DL — LOW (ref 11.5–15.5)
MAGNESIUM SERPL-MCNC: 2.5 MG/DL — SIGNIFICANT CHANGE UP (ref 1.6–2.6)
MCHC RBC-ENTMCNC: 33 GM/DL — SIGNIFICANT CHANGE UP (ref 32–36)
MCHC RBC-ENTMCNC: 34.4 PG — HIGH (ref 27–34)
MCV RBC AUTO: 104 FL — HIGH (ref 80–100)
PHOSPHATE SERPL-MCNC: 3.6 MG/DL — SIGNIFICANT CHANGE UP (ref 2.5–4.5)
PLATELET # BLD AUTO: 304 K/UL — SIGNIFICANT CHANGE UP (ref 150–400)
POTASSIUM SERPL-MCNC: 4.9 MMOL/L — SIGNIFICANT CHANGE UP (ref 3.5–5.3)
POTASSIUM SERPL-SCNC: 4.9 MMOL/L — SIGNIFICANT CHANGE UP (ref 3.5–5.3)
RBC # BLD: 2.69 M/UL — LOW (ref 3.8–5.2)
RBC # FLD: 13.2 % — SIGNIFICANT CHANGE UP (ref 10.3–14.5)
SODIUM SERPL-SCNC: 135 MMOL/L — SIGNIFICANT CHANGE UP (ref 135–145)
WBC # BLD: 5.8 K/UL — SIGNIFICANT CHANGE UP (ref 3.8–10.5)
WBC # FLD AUTO: 5.8 K/UL — SIGNIFICANT CHANGE UP (ref 3.8–10.5)

## 2017-08-01 PROCEDURE — 99232 SBSQ HOSP IP/OBS MODERATE 35: CPT

## 2017-08-01 RX ORDER — HYDROMORPHONE HYDROCHLORIDE 2 MG/ML
1 INJECTION INTRAMUSCULAR; INTRAVENOUS; SUBCUTANEOUS ONCE
Qty: 0 | Refills: 0 | Status: DISCONTINUED | OUTPATIENT
Start: 2017-08-01 | End: 2017-08-01

## 2017-08-01 RX ORDER — OXYCODONE AND ACETAMINOPHEN 5; 325 MG/1; MG/1
1 TABLET ORAL ONCE
Qty: 0 | Refills: 0 | Status: DISCONTINUED | OUTPATIENT
Start: 2017-08-01 | End: 2017-08-01

## 2017-08-01 RX ORDER — ERYTHROPOIETIN 10000 [IU]/ML
10000 INJECTION, SOLUTION INTRAVENOUS; SUBCUTANEOUS ONCE
Qty: 0 | Refills: 0 | Status: COMPLETED | OUTPATIENT
Start: 2017-08-01 | End: 2017-08-01

## 2017-08-01 RX ORDER — HYDRALAZINE HCL 50 MG
5 TABLET ORAL ONCE
Qty: 0 | Refills: 0 | Status: COMPLETED | OUTPATIENT
Start: 2017-08-01 | End: 2017-08-01

## 2017-08-01 RX ADMIN — HYDROMORPHONE HYDROCHLORIDE 1 MILLIGRAM(S): 2 INJECTION INTRAMUSCULAR; INTRAVENOUS; SUBCUTANEOUS at 01:13

## 2017-08-01 RX ADMIN — Medication 100 MILLIGRAM(S): at 13:51

## 2017-08-01 RX ADMIN — Medication 100 MILLIGRAM(S): at 05:24

## 2017-08-01 RX ADMIN — OXYCODONE AND ACETAMINOPHEN 1 TABLET(S): 5; 325 TABLET ORAL at 19:55

## 2017-08-01 RX ADMIN — ERYTHROPOIETIN 10000 UNIT(S): 10000 INJECTION, SOLUTION INTRAVENOUS; SUBCUTANEOUS at 10:41

## 2017-08-01 RX ADMIN — Medication 5 MILLIGRAM(S): at 00:40

## 2017-08-01 RX ADMIN — SENNA PLUS 2 TABLET(S): 8.6 TABLET ORAL at 21:33

## 2017-08-01 RX ADMIN — ATORVASTATIN CALCIUM 20 MILLIGRAM(S): 80 TABLET, FILM COATED ORAL at 21:34

## 2017-08-01 RX ADMIN — HYDROMORPHONE HYDROCHLORIDE 1 MILLIGRAM(S): 2 INJECTION INTRAMUSCULAR; INTRAVENOUS; SUBCUTANEOUS at 01:43

## 2017-08-01 RX ADMIN — CLOPIDOGREL BISULFATE 75 MILLIGRAM(S): 75 TABLET, FILM COATED ORAL at 13:51

## 2017-08-01 RX ADMIN — DULOXETINE HYDROCHLORIDE 60 MILLIGRAM(S): 30 CAPSULE, DELAYED RELEASE ORAL at 13:51

## 2017-08-01 RX ADMIN — OXYCODONE AND ACETAMINOPHEN 1 TABLET(S): 5; 325 TABLET ORAL at 05:24

## 2017-08-01 RX ADMIN — Medication 100 MILLIGRAM(S): at 21:33

## 2017-08-01 RX ADMIN — Medication 81 MILLIGRAM(S): at 13:51

## 2017-08-01 RX ADMIN — OXYCODONE AND ACETAMINOPHEN 1 TABLET(S): 5; 325 TABLET ORAL at 05:30

## 2017-08-01 RX ADMIN — CINACALCET 60 MILLIGRAM(S): 30 TABLET, FILM COATED ORAL at 13:51

## 2017-08-01 RX ADMIN — Medication 100 MILLIGRAM(S): at 21:34

## 2017-08-01 NOTE — PROVIDER CONTACT NOTE (OTHER) - ASSESSMENT
Pt ANOx4, able to make needs known. Blood pressure is 200/76, pt is asymptomatic. Denies chest pain, headache, dizziness. Pt still complaining of pain in tooth

## 2017-08-01 NOTE — PROGRESS NOTE ADULT - ASSESSMENT
62 f with DKA / diabetes Mellitus type 1  BS control/ endocrine follow. DKA resolvig- restart insulin pump/ bridge with Insulin gtt.  s/p NSTEMI-  asa/Plavix. Cardiology follow, pain control. Catheterization pending.  ESRD- continue HD  Peripheral Vascular disease- conservative management.  Pierce Viera MD pager [phone]

## 2017-08-01 NOTE — PROVIDER CONTACT NOTE (OTHER) - ASSESSMENT
Pt ANOx4, able to make needs known. Blood pressure is 202/78, pt is asymptomatic. Denies chest pain, headache, dizziness. Pt still complaining of pain in tooth

## 2017-08-01 NOTE — CHART NOTE - NSCHARTNOTEFT_GEN_A_CORE
Removal of Femoral Sheath    Pulses in the right lower extremity are palpable. The patient was placed in the supine position. The insertion site was identified and the sutures were removed per protocol.  The _6___ [place] femoral sheath was then removed. Direct pressure was applied for  _30_____ minutes.     Monitoring of the right groin and both lower extremities including neuro-vascular checks and vital signs every 15 minutes x 4, then every 30 minutes x 2, then every 1 hour was ordered.    Complications: None    Comments:  Activity restrictions and reportable symptoms discussed with patient

## 2017-08-01 NOTE — PROVIDER CONTACT NOTE (OTHER) - ASSESSMENT
Pt ANOx4, able to make needs known. Blood pressure is 202/82, pt is asymptomatic. Denies chest pain, headache, dizziness. Pt ANOx4, able to make needs known. Blood pressure is 202/82, pt is asymptomatic. Denies chest pain, headache, dizziness. Pt complaining of tooth pain, wants her tooth pulled.

## 2017-08-01 NOTE — PROGRESS NOTE ADULT - PROBLEM SELECTOR PLAN 1
To go for cardiac cath today.  I changed her temp basal from 70% to 80% when I saw her to run through 7 pm tonight to ensure she gets through cardiac cath without a low BG.  Pump will automatically switch back to default settings at 7 pm and pt can bolus/correct any time with current temp basal as well.  Pt saavy with pump.    Primary team asked about elevated anion gap, was in setting of being right before HD and sugars haven't been >200, so have relatively low suspicion for DKA but could get BOHB if concerned versus f/u BMP post HD.

## 2017-08-01 NOTE — PROGRESS NOTE ADULT - ASSESSMENT
61 yo female with hx of T1DM controlled with pvd s/p fem-pop b/l, retinopathy, CAD, ESRD on HD, and neuropathy here with DKA and NSTEMI. Had mild DKA on 7/26, received HD then insulin gtt, then gap closed on morning of 7/27 and pump reattached.  BG labile.

## 2017-08-01 NOTE — PROGRESS NOTE ADULT - SUBJECTIVE AND OBJECTIVE BOX
Patient is a 62y old  Female who presents with a chief complaint of     SUBJECTIVE / OVERNIGHT EVENTS: Comfortable without new complaints.   Review of Systems  chest pain no  palpitations no  sob no  nausea no  headache no    MEDICATIONS  (STANDING):  DULoxetine 60 milliGRAM(s) Oral daily  atorvastatin 20 milliGRAM(s) Oral at bedtime  clopidogrel Tablet 75 milliGRAM(s) Oral daily  aspirin enteric coated 81 milliGRAM(s) Oral daily  cinacalcet 60 milliGRAM(s) Oral daily  hydrALAZINE 100 milliGRAM(s) Oral every 8 hours  dextrose 50% Injectable 50 milliLiter(s) IV Push every 15 minutes  dextrose 50% Injectable 25 milliLiter(s) IV Push every 15 minutes  senna 2 Tablet(s) Oral at bedtime  docusate sodium 100 milliGRAM(s) Oral three times a day  dextrose 5%. 1000 milliLiter(s) (50 mL/Hr) IV Continuous <Continuous>  sevelamer hydrochloride 2400 milliGRAM(s) Oral three times a day with meals  heparin  Injectable 5000 Unit(s) SubCutaneous every 8 hours  insulin lispro (HumaLOG) Pump 1 Each SubCutaneous Continuous Pump  metoprolol succinate  milliGRAM(s) Oral daily  epoetin azalea Injectable 08396 Unit(s) IV Push once    MEDICATIONS  (PRN):  acetaminophen   Tablet. 650 milliGRAM(s) Oral every 6 hours PRN Mild and/or moderate Pain  ondansetron Injectable 4 milliGRAM(s) IV Push every 6 hours PRN Nausea and/or Vomiting  dextrose Gel 1 Dose(s) Oral once PRN Blood Glucose LESS THAN 70 milliGRAM(s)/deciLiter  glucagon  Injectable 1 milliGRAM(s) IntraMuscular once PRN Glucose <70 milliGRAM(s)/deciLiter          PHYSICAL EXAM:  GENERAL: NAD, well-developed  HEAD:  Atraumatic, Normocephalic  EYES: EOMI, PERRLA, conjunctiva and sclera clear  NECK: Supple, No JVD  CHEST/LUNG: Clear to auscultation bilaterally; No wheeze  HEART: Regular rate and rhythm; No murmurs, rubs, or gallops  ABDOMEN: Soft, Nontender, Nondistended; Bowel sounds present  EXTREMITIES:  2+ Peripheral Pulses, No clubbing, cyanosis, or edema  PSYCH: AAOx3  NEUROLOGY: non-focal  SKIN: No rashes or lesions    LABS:                        9.2    5.8   )-----------( 304      ( 01 Aug 2017 07:07 )             27.9     08-01    135  |  94<L>  |  25<H>  ----------------------------<  177<H>  4.9   |  21<L>  |  8.01<H>    Ca    8.4      01 Aug 2017 07:07  Phos  3.6     08-01  Mg     2.5     08-01    TPro  6.3  /  Alb  3.5  /  TBili  0.3  /  DBili  x   /  AST  19  /  ALT  9<L>  /  AlkPhos  213<H>  07-31              RADIOLOGY & ADDITIONAL TESTS:    Imaging Personally Reviewed:    Consultant(s) Notes Reviewed:      Care Discussed with Consultants/Other Providers:

## 2017-08-01 NOTE — PROVIDER CONTACT NOTE (OTHER) - SITUATION
Pt still hypertensive at 202/82 taken manually after 100mg hydralazine. Pt also complaining of toothache.

## 2017-08-01 NOTE — PROVIDER CONTACT NOTE (OTHER) - ASSESSMENT
Pt ANOx4, able to make needs known. Blood pressure is 206/74, pt is asymptomatic. Denies chest pain, headache, dizziness.

## 2017-08-01 NOTE — PROGRESS NOTE ADULT - ASSESSMENT
1-  ESRD      Hemodialysis Prescription:  	Access:left AV fistula  	Dialyzer: revaclear  300  	Blood Flow (mL/Min): 400  	Dialysate Flow (mL/Min): 600  	Target UF (Liters):2-2.5 liter as tolerated  	Treatment Time: 3.5 hr  	Potassium: 2 k   	Calcium: 2.5  	  YOLANDA  epogen 10,000 units IV push    Vitamin D     continue with hd   see hd flow sheet

## 2017-08-01 NOTE — PROGRESS NOTE ADULT - NSHPATTENDINGPLANDISCUSS_GEN_ALL_CORE
medical team.
NP on primary team
patient, intensivist, and nurse
[facility]. [facility], RN and attg (Dr. Cuevas)
MICU team and patient.

## 2017-08-01 NOTE — PROGRESS NOTE ADULT - SUBJECTIVE AND OBJECTIVE BOX
[facility]   [phone]  DIALYSIS NOTE  Chief Complaint: ESRD/Ongoing hemodialysis requirement. seen on hd    24 hour events/subjective:    states did not sleep last night.  Feels tired        ALLERGIES & MEDICATIONS  --------------------------------------------------------------------------------  Allergies    No Known Allergies    Intolerances      Standing Inpatient Medications  DULoxetine 60 milliGRAM(s) Oral daily  atorvastatin 20 milliGRAM(s) Oral at bedtime  clopidogrel Tablet 75 milliGRAM(s) Oral daily  aspirin enteric coated 81 milliGRAM(s) Oral daily  cinacalcet 60 milliGRAM(s) Oral daily  hydrALAZINE 100 milliGRAM(s) Oral every 8 hours  dextrose 50% Injectable 50 milliLiter(s) IV Push every 15 minutes  dextrose 50% Injectable 25 milliLiter(s) IV Push every 15 minutes  senna 2 Tablet(s) Oral at bedtime  docusate sodium 100 milliGRAM(s) Oral three times a day  dextrose 5%. 1000 milliLiter(s) IV Continuous <Continuous>  sevelamer hydrochloride 2400 milliGRAM(s) Oral three times a day with meals  heparin  Injectable 5000 Unit(s) SubCutaneous every 8 hours  insulin lispro (HumaLOG) Pump 1 Each SubCutaneous Continuous Pump  metoprolol succinate  milliGRAM(s) Oral daily    PRN Inpatient Medications  acetaminophen   Tablet. 650 milliGRAM(s) Oral every 6 hours PRN  ondansetron Injectable 4 milliGRAM(s) IV Push every 6 hours PRN  dextrose Gel 1 Dose(s) Oral once PRN  glucagon  Injectable 1 milliGRAM(s) IntraMuscular once PRN      REVIEW OF SYSTEMS  --------------------------------------------------------------------------------  no itching or rash  no fever or chill  no cp or palp   no sob or cough   no N/V/D/ no abd pain   ext no edema     VITALS/PHYSICAL EXAM  --------------------------------------------------------------------------------  T(C): 36.8 (08-01-17 @ 05:10), Max: 36.9 (07-31-17 @ 21:30)  HR: 73 (08-01-17 @ 05:10) (64 - 87)  BP: 182/67 (08-01-17 @ 05:10) (161/71 - 206/74)  RR: 18 (08-01-17 @ 05:10) (18 - 18)  SpO2: 97% (08-01-17 @ 05:10) (96% - 97%)  Wt(kg): --        07-31-17 @ 07:01  -  08-01-17 @ 07:00  --------------------------------------------------------  IN: 240 mL / OUT: 0 mL / NET: 240 mL    08-01-17 @ 07:01  -  08-01-17 @ 11:09  --------------------------------------------------------  IN: 240 mL / OUT: 0 mL / NET: 240 mL      Physical Exam:  		    	Gen: alert oriented place person and date   	Pulm: Decreased breath sounds b/l bases no rales or ronchi  	CV: RRR, S1/S2  	Abd: +BS, soft, nontender/nondistended  	Extremity: No cyanosis, trace left lower extremity edema no clubbing  	Neuro: No focal deficits  	    LABS/STUDIES  --------------------------------------------------------------------------------              9.2    5.8   >-----------<  304      [08-01-17 @ 07:07]              27.9     135  |  94  |  25  ----------------------------<  177      [08-01-17 @ 07:07]  4.9   |  21  |  8.01        Ca     8.4     [08-01-17 @ 07:07]      Mg     2.5     [08-01-17 @ 07:07]      Phos  3.6     [08-01-17 @ 07:07]    TPro  6.3  /  Alb  3.5  /  TBili  0.3  /  DBili  x   /  AST  19  /  ALT  9   /  AlkPhos  213  [07-31-17 @ 07:09]                  imp/suggest:

## 2017-08-01 NOTE — PROVIDER CONTACT NOTE (OTHER) - ACTION/TREATMENT ORDERED:
KALI Leung made aware. recheck blood pressure one hour after 100mg hydralazine given KALI Leung made aware. 2mg of morphine IVP ordered. Recheck in 1hour after administration

## 2017-08-01 NOTE — PROVIDER CONTACT NOTE (OTHER) - SITUATION
Pt still hypertensive at 200/76 taken manually after 100mg hydralazine PO, 2mg morphine IVP, 5mg oxycodone, & 5mg hydralazine IVP

## 2017-08-01 NOTE — PROGRESS NOTE ADULT - SUBJECTIVE AND OBJECTIVE BOX
Chief Complaint: type 1 diabetes on insulin pump    Interval history: Pt got HD this morning, now preparign to go for cardiac cath this afternoon.  Hasn't eaten anything today.  She was taken down to 70% temp basal o/n out of concern she may go for cath early today. Was still at 70% when I saw her around 1:15 pm, sugasr in mid to high 100s.     MEDICATIONS  (STANDING):  DULoxetine 60 milliGRAM(s) Oral daily  atorvastatin 20 milliGRAM(s) Oral at bedtime  clopidogrel Tablet 75 milliGRAM(s) Oral daily  aspirin enteric coated 81 milliGRAM(s) Oral daily  cinacalcet 60 milliGRAM(s) Oral daily  hydrALAZINE 100 milliGRAM(s) Oral every 8 hours  dextrose 50% Injectable 50 milliLiter(s) IV Push every 15 minutes  dextrose 50% Injectable 25 milliLiter(s) IV Push every 15 minutes  senna 2 Tablet(s) Oral at bedtime  docusate sodium 100 milliGRAM(s) Oral three times a day  dextrose 5%. 1000 milliLiter(s) (50 mL/Hr) IV Continuous <Continuous>  sevelamer hydrochloride 2400 milliGRAM(s) Oral three times a day with meals  heparin  Injectable 5000 Unit(s) SubCutaneous every 8 hours  insulin lispro (HumaLOG) Pump 1 Each SubCutaneous Continuous Pump  metoprolol succinate  milliGRAM(s) Oral daily    MEDICATIONS  (PRN):  acetaminophen   Tablet. 650 milliGRAM(s) Oral every 6 hours PRN Mild and/or moderate Pain  ondansetron Injectable 4 milliGRAM(s) IV Push every 6 hours PRN Nausea and/or Vomiting  dextrose Gel 1 Dose(s) Oral once PRN Blood Glucose LESS THAN 70 milliGRAM(s)/deciLiter  glucagon  Injectable 1 milliGRAM(s) IntraMuscular once PRN Glucose <70 milliGRAM(s)/deciLiter      Allergies    No Known Allergies    Intolerances      Review of Systems:  Endocrine: no polyuria, polydipsia  Hem/lymph: no swelling    ALL OTHER SYSTEMS REVIEWED AND NEGATIVE      PHYSICAL EXAM:  VITALS: T(C): 36.7 (08-01-17 @ 13:40)  T(F): 98.1 (08-01-17 @ 13:40), Max: 98.4 (07-31-17 @ 21:30)  HR: 65 (08-01-17 @ 13:40) (65 - 87)  BP: 146/68 (08-01-17 @ 13:40) (146/68 - 206/74)  RR:  (18 - 18)  SpO2:  (96% - 98%)  Wt(kg): --  GENERAL: NAD, well-groomed, well-developed  EYES: No proptosis, no lid lag, anicteric  SKIN: Dry, intact, No rashes or lesions  PSYCH: Alert and oriented x 3, normal affect, normal mood    CAPILLARY BLOOD GLUCOSE  99 (08-01 @ 13:40)  198 (08-01 @ 07:56)  188 (08-01 @ 05:30)  156 (08-01 @ 03:00)  156 (08-01 @ 01:15)  170 (07-31 @ 21:35)  111 (07-31 @ 16:56)  179 (07-31 @ 13:16)  113 (07-31 @ 09:22)  90 (07-31 @ 07:49)  116 (07-31 @ 05:02)  89 (07-31 @ 01:15)  152 (07-30 @ 22:17)  119 (07-30 @ 16:22)  127 (07-30 @ 11:58)  212 (07-30 @ 08:41)  240 (07-30 @ 05:00)  175 (07-30 @ 01:00)  160 (07-29 @ 22:10)  83 (07-29 @ 21:11)  121 (07-29 @ 17:05)      08-01    135  |  94<L>  |  25<H>  ----------------------------<  177<H>  4.9   |  21<L>  |  8.01<H>    EGFR if : 6<L>  EGFR if non : 5<L>    Ca    8.4      08-01  Mg     2.5     08-01  Phos  3.6     08-01    TPro  6.3  /  Alb  3.5  /  TBili  0.3  /  DBili  x   /  AST  19  /  ALT  9<L>  /  AlkPhos  213<H>  07-31          Thyroid Function Tests:      Hemoglobin A1C, Whole Blood: 6.8 % <H> [4.0 - 5.6] (07-24-17 @ 06:15)  Hemoglobin A1C, Whole Blood: 6.8 % <H> [4.0 - 5.6] (07-23-17 @ 22:45)  Hemoglobin A1C, Whole Blood: 7.3 % <H> [4.0 - 5.6] (06-16-17 @ 04:45)

## 2017-08-02 LAB
ANION GAP SERPL CALC-SCNC: 17 MMOL/L — SIGNIFICANT CHANGE UP (ref 5–17)
BASOPHILS # BLD AUTO: 0.02 K/UL — SIGNIFICANT CHANGE UP (ref 0–0.2)
BASOPHILS NFR BLD AUTO: 0.4 % — SIGNIFICANT CHANGE UP (ref 0–2)
BUN SERPL-MCNC: 14 MG/DL — SIGNIFICANT CHANGE UP (ref 7–23)
CALCIUM SERPL-MCNC: 7.7 MG/DL — LOW (ref 8.4–10.5)
CHLORIDE SERPL-SCNC: 95 MMOL/L — LOW (ref 96–108)
CO2 SERPL-SCNC: 24 MMOL/L — SIGNIFICANT CHANGE UP (ref 22–31)
CREAT SERPL-MCNC: 5.02 MG/DL — HIGH (ref 0.5–1.3)
EOSINOPHIL # BLD AUTO: 0.11 K/UL — SIGNIFICANT CHANGE UP (ref 0–0.5)
EOSINOPHIL NFR BLD AUTO: 2 % — SIGNIFICANT CHANGE UP (ref 0–6)
GLUCOSE SERPL-MCNC: 51 MG/DL — LOW (ref 70–99)
HCT VFR BLD CALC: 27.8 % — LOW (ref 34.5–45)
HCT VFR BLD CALC: 28.1 % — LOW (ref 34.5–45)
HGB BLD-MCNC: 8.7 G/DL — LOW (ref 11.5–15.5)
HGB BLD-MCNC: 9.4 G/DL — LOW (ref 11.5–15.5)
IMM GRANULOCYTES NFR BLD AUTO: 0.2 % — SIGNIFICANT CHANGE UP (ref 0–1.5)
LYMPHOCYTES # BLD AUTO: 0.84 K/UL — LOW (ref 1–3.3)
LYMPHOCYTES # BLD AUTO: 15.1 % — SIGNIFICANT CHANGE UP (ref 13–44)
MAGNESIUM SERPL-MCNC: 2.3 MG/DL — SIGNIFICANT CHANGE UP (ref 1.6–2.6)
MCHC RBC-ENTMCNC: 31.3 GM/DL — LOW (ref 32–36)
MCHC RBC-ENTMCNC: 32 PG — SIGNIFICANT CHANGE UP (ref 27–34)
MCHC RBC-ENTMCNC: 33.3 GM/DL — SIGNIFICANT CHANGE UP (ref 32–36)
MCHC RBC-ENTMCNC: 34.3 PG — HIGH (ref 27–34)
MCV RBC AUTO: 102.2 FL — HIGH (ref 80–100)
MCV RBC AUTO: 103 FL — HIGH (ref 80–100)
MONOCYTES # BLD AUTO: 0.8 K/UL — SIGNIFICANT CHANGE UP (ref 0–0.9)
MONOCYTES NFR BLD AUTO: 14.4 % — HIGH (ref 2–14)
NEUTROPHILS # BLD AUTO: 3.79 K/UL — SIGNIFICANT CHANGE UP (ref 1.8–7.4)
NEUTROPHILS NFR BLD AUTO: 67.9 % — SIGNIFICANT CHANGE UP (ref 43–77)
PHOSPHATE SERPL-MCNC: 3.1 MG/DL — SIGNIFICANT CHANGE UP (ref 2.5–4.5)
PLATELET # BLD AUTO: 281 K/UL — SIGNIFICANT CHANGE UP (ref 150–400)
PLATELET # BLD AUTO: 299 K/UL — SIGNIFICANT CHANGE UP (ref 150–400)
POTASSIUM SERPL-MCNC: 4 MMOL/L — SIGNIFICANT CHANGE UP (ref 3.5–5.3)
POTASSIUM SERPL-SCNC: 4 MMOL/L — SIGNIFICANT CHANGE UP (ref 3.5–5.3)
RBC # BLD: 2.72 M/UL — LOW (ref 3.8–5.2)
RBC # BLD: 2.73 M/UL — LOW (ref 3.8–5.2)
RBC # FLD: 13 % — SIGNIFICANT CHANGE UP (ref 10.3–14.5)
RBC # FLD: 14.3 % — SIGNIFICANT CHANGE UP (ref 10.3–14.5)
SODIUM SERPL-SCNC: 136 MMOL/L — SIGNIFICANT CHANGE UP (ref 135–145)
WBC # BLD: 5.57 K/UL — SIGNIFICANT CHANGE UP (ref 3.8–10.5)
WBC # BLD: 5.9 K/UL — SIGNIFICANT CHANGE UP (ref 3.8–10.5)
WBC # FLD AUTO: 5.57 K/UL — SIGNIFICANT CHANGE UP (ref 3.8–10.5)
WBC # FLD AUTO: 5.9 K/UL — SIGNIFICANT CHANGE UP (ref 3.8–10.5)

## 2017-08-02 PROCEDURE — 99233 SBSQ HOSP IP/OBS HIGH 50: CPT

## 2017-08-02 RX ORDER — INSULIN LISPRO 100/ML
1 VIAL (ML) SUBCUTANEOUS
Qty: 0 | Refills: 0 | Status: DISCONTINUED | OUTPATIENT
Start: 2017-08-02 | End: 2017-08-03

## 2017-08-02 RX ORDER — DEXTROSE 50 % IN WATER 50 %
50 SYRINGE (ML) INTRAVENOUS
Qty: 0 | Refills: 0 | Status: COMPLETED | OUTPATIENT
Start: 2017-08-02 | End: 2017-08-02

## 2017-08-02 RX ORDER — CALCIUM GLUCONATE 100 MG/ML
1 VIAL (ML) INTRAVENOUS ONCE
Qty: 1 | Refills: 0 | Status: COMPLETED | OUTPATIENT
Start: 2017-08-02 | End: 2017-08-02

## 2017-08-02 RX ORDER — OXYCODONE HYDROCHLORIDE 5 MG/1
5 TABLET ORAL EVERY 6 HOURS
Qty: 0 | Refills: 0 | Status: DISCONTINUED | OUTPATIENT
Start: 2017-08-02 | End: 2017-08-03

## 2017-08-02 RX ADMIN — Medication 100 MILLIGRAM(S): at 05:47

## 2017-08-02 RX ADMIN — OXYCODONE HYDROCHLORIDE 5 MILLIGRAM(S): 5 TABLET ORAL at 22:35

## 2017-08-02 RX ADMIN — Medication 200 GRAM(S): at 12:34

## 2017-08-02 RX ADMIN — OXYCODONE HYDROCHLORIDE 5 MILLIGRAM(S): 5 TABLET ORAL at 22:04

## 2017-08-02 RX ADMIN — SENNA PLUS 2 TABLET(S): 8.6 TABLET ORAL at 22:03

## 2017-08-02 RX ADMIN — DULOXETINE HYDROCHLORIDE 60 MILLIGRAM(S): 30 CAPSULE, DELAYED RELEASE ORAL at 12:33

## 2017-08-02 RX ADMIN — SEVELAMER CARBONATE 2400 MILLIGRAM(S): 2400 POWDER, FOR SUSPENSION ORAL at 12:32

## 2017-08-02 RX ADMIN — CLOPIDOGREL BISULFATE 75 MILLIGRAM(S): 75 TABLET, FILM COATED ORAL at 12:33

## 2017-08-02 RX ADMIN — Medication 100 MILLIGRAM(S): at 13:22

## 2017-08-02 RX ADMIN — CINACALCET 60 MILLIGRAM(S): 30 TABLET, FILM COATED ORAL at 12:33

## 2017-08-02 RX ADMIN — Medication 100 MILLIGRAM(S): at 22:03

## 2017-08-02 RX ADMIN — Medication 100 MILLIGRAM(S): at 22:04

## 2017-08-02 RX ADMIN — SEVELAMER CARBONATE 2400 MILLIGRAM(S): 2400 POWDER, FOR SUSPENSION ORAL at 08:32

## 2017-08-02 RX ADMIN — Medication 100 MILLIGRAM(S): at 12:32

## 2017-08-02 RX ADMIN — Medication 50 MILLILITER(S): at 17:03

## 2017-08-02 RX ADMIN — Medication 81 MILLIGRAM(S): at 12:33

## 2017-08-02 RX ADMIN — ATORVASTATIN CALCIUM 20 MILLIGRAM(S): 80 TABLET, FILM COATED ORAL at 22:03

## 2017-08-02 NOTE — PROVIDER CONTACT NOTE (OTHER) - SITUATION
Pt morning blood sugar was 51, repeat blood sugar was 51 Pt morning blood sugar was 53, repeat blood sugar was 53

## 2017-08-02 NOTE — PROGRESS NOTE ADULT - SUBJECTIVE AND OBJECTIVE BOX
[facility]   [phone]  RENAL FOLLOW UP NOTE  --------------------------------------------------------------------------------  Chief Complaint:    24 hour events/subjective:     states this am is feeling better. no new c/o are offered today . s/p coronary angio and RCA PTCA  PAST HISTORY  --------------------------------------------------------------------------------  No significant changes to PMH, PSH, FHx, SHx, unless otherwise noted    ALLERGIES & MEDICATIONS  --------------------------------------------------------------------------------  Allergies    No Known Allergies    Intolerances      Standing Inpatient Medications  DULoxetine 60 milliGRAM(s) Oral daily  atorvastatin 20 milliGRAM(s) Oral at bedtime  clopidogrel Tablet 75 milliGRAM(s) Oral daily  aspirin enteric coated 81 milliGRAM(s) Oral daily  cinacalcet 60 milliGRAM(s) Oral daily  hydrALAZINE 100 milliGRAM(s) Oral every 8 hours  dextrose 50% Injectable 50 milliLiter(s) IV Push every 15 minutes  dextrose 50% Injectable 25 milliLiter(s) IV Push every 15 minutes  senna 2 Tablet(s) Oral at bedtime  docusate sodium 100 milliGRAM(s) Oral three times a day  dextrose 5%. 1000 milliLiter(s) IV Continuous <Continuous>  sevelamer hydrochloride 2400 milliGRAM(s) Oral three times a day with meals  heparin  Injectable 5000 Unit(s) SubCutaneous every 8 hours  insulin lispro (HumaLOG) Pump 1 Each SubCutaneous Continuous Pump  metoprolol succinate  milliGRAM(s) Oral daily  calcium gluconate IVPB 1 Gram(s) IV Intermittent once    PRN Inpatient Medications  acetaminophen   Tablet. 650 milliGRAM(s) Oral every 6 hours PRN  ondansetron Injectable 4 milliGRAM(s) IV Push every 6 hours PRN  dextrose Gel 1 Dose(s) Oral once PRN  glucagon  Injectable 1 milliGRAM(s) IntraMuscular once PRN      REVIEW OF SYSTEMS  --------------------------------------------------------------------------------    Gen: denies fatigue, fevers/chills,  CVS: denies chest pain/palpitations  Resp: denies SOB/Cough  GI: Denies N/V/Abd pain  right foot pain very minimal at present      All other systems were reviewed and are negative, except as noted.    VITALS/PHYSICAL EXAM  --------------------------------------------------------------------------------  T(C): 37.1 (08-02-17 @ 05:39), Max: 37.1 (08-01-17 @ 21:09)  HR: 68 (08-02-17 @ 05:39) (62 - 76)  BP: 160/59 (08-02-17 @ 05:39) (124/67 - 178/88)  RR: 18 (08-02-17 @ 05:39) (16 - 18)  SpO2: 95% (08-02-17 @ 05:39) (95% - 98%)  Wt(kg): --        08-01-17 @ 07:01  -  08-02-17 @ 07:00  --------------------------------------------------------  IN: 1240 mL / OUT: 0 mL / NET: 1240 mL      Physical Exam:  	    Physical Exam:  	Gen: alert oriented place person and date   	Pulm: Decreased breath sounds b/l bases. no rales or ronchi or wheezing  	CV: RRR, S1/S2. no rub  	Back: No CVA tenderness; no sacral edema  	Abd: +BS, soft, nontender/nondistended  	: No suprapubic tenderness.               Extremity: No cyanosis, no edema no clubbing  	  LABS/STUDIES  --------------------------------------------------------------------------------              8.7    5.57  >-----------<  299      [08-02-17 @ 08:37]              27.8     136  |  95  |  14  ----------------------------<  51      [08-02-17 @ 07:01]  4.0   |  24  |  5.02        Ca     7.7     [08-02-17 @ 07:01]      Mg     2.3     [08-02-17 @ 07:01]      Phos  3.1     [08-02-17 @ 07:01]            Creatinine Trend:  SCr 5.02 [08-02 @ 07:01]  SCr 8.01 [08-01 @ 07:07]  SCr 5.95 [07-31 @ 07:09]  SCr 4.41 [07-30 @ 09:34]  SCr 6.60 [07-29 @ 09:01]                  HbA1c 6.8      [07-24-17 @ 06:15]

## 2017-08-02 NOTE — PROVIDER CONTACT NOTE (OTHER) - ACTION/TREATMENT ORDERED:
Patient agrees for D50 treatment as according to protocol will continue to moniter in 15 minutes and notify provider

## 2017-08-02 NOTE — PROVIDER CONTACT NOTE (OTHER) - BACKGROUND
61 y/o F admitted to MICU on 7/24 w/ DKA and placed on insulin drip.
Admitted to to MICU on 7/24 w/DKA and placed on insulin drip. Transferred back to floor on 7/25. AM 7/26 had FS 24 on floor and started on D5W, MICU consulted for glucose in 300's in afternoon, gap
Admitted to to MICU on 7/24 w/DKA and placed on insulin drip. Transferred back to floor on 7/25. AM 7/26 had FS 24 on floor and started on D5W, MICU consulted for glucose in 300's in afternoon, gap
61 y/o F admitted to MICU on 7/24 w/ DKA and placed on insulin drip.
61 y/o F admitted to MICU on 7/24 w/ DKA and placed on insulin drip.
63 y/o F admitted to MICU on 7/24 w/ DKA and placed on insulin drip.
Patient admitted with DKA
Patient admitted with DM type 1 ketoacidosis without coma
Patient admitted with type 1 DM with ketoacidosis without coma
Pt admitted for DKA & NSTEMI, type I DM, w/insulin pump.
Dx: DKA   PMH: CAD, ACS, TIA, CVA, old MI, ESRD, Hyperlipidemia

## 2017-08-02 NOTE — PROVIDER CONTACT NOTE (OTHER) - ACTION/TREATMENT ORDERED:
KALI Leung made aware. Give 15 grams of carbs, recheck in 15 minutes. KALI Leung made aware. Give dextrose gel, recheck until blood sugar 100 twice.

## 2017-08-02 NOTE — PROVIDER CONTACT NOTE (OTHER) - RECOMMENDATIONS
Give 15 grams of carbs, recheck in 15 minutes. Continue giving carbs, recheck until fingerstick 100 twice.

## 2017-08-02 NOTE — PROGRESS NOTE ADULT - PROBLEM SELECTOR PLAN 1
-Test BG ac/hs and 2am  -Decreased Basal settings at 12mn to 0.25 and 5am to 0.35  -Pt to change pump site  8/4/17  -Pt might continue to use insulin pump while in hospital.  -Plan discussed with pt and team.  Contact

## 2017-08-02 NOTE — PROGRESS NOTE ADULT - ASSESSMENT
61 yo female with hx of T1DM with multiple complications here with CP/DKA now s/p stenting. Hypoglycemic this am on 100% basal insulin pump settings.  Decreased basal settings to prevent further hypoglycemia.

## 2017-08-02 NOTE — PROGRESS NOTE ADULT - SUBJECTIVE AND OBJECTIVE BOX
DIABETES FOLLOW UP NOTE: Saw pt earlier today  INTERVAL HX:62 y/o F with uncontrolled T1DM with multiple complications including ESRD on chronic HD. Here with CP/DKA now s/p PCI DESX1 yesterday.   Glycemic control not at goal. Pt on metronic insulin pump with basal settings at 70% while NPO. Reset to 100% basal overnight with fasting hypoglycemia today. Pt reports eating well.       Review of Systems:  Cardiovascular: No chest pain, palpitations  Respiratory: No SOB, no cough  GI: No nausea, vomiting, abdominal pain  Endocrine: no polyuria, polydipsia or S&Sx of hypoglycemia    Allergies    No Known Allergies    Intolerances      MEDICATIONS  (STANDING):  atorvastatin 20 milliGRAM(s) Oral at bedtime  insulin lispro (HumaLOG) Pump 1 Each SubCutaneous Continuous Pump  BASAL:  12MN 0.275; 5AM 0.375  I:C Ratio: 12mn 12; 3pm 11  ISF: 12MN 60; 7AM 40 AND 6PM 60               PHYSICAL EXAM:  VITALS: T(C): 36.7 (08-02-17 @ 13:23)  T(F): 98 (08-02-17 @ 13:23), Max: 98.8 (08-01-17 @ 21:45)  HR: 60 (08-02-17 @ 13:23) (60 - 76)  BP: 157/72 (08-02-17 @ 13:23) (124/67 - 178/88)  RR:  (16 - 18)  SpO2:  (95% - 98%)  Wt(kg): --  GENERAL: NAD  Abdomen: Soft, nontender, non distended, LQ insulin pump site D&I  Extremities: WARM NO EDEMA NOTED IN ALL 4 EXTS  NEURO:A&OX3    CAPILLARY BLOOD GLUCOSE  165 (08-02 @ 11:59)  208 (08-02 @ 07:32)  82 (08-02 @ 06:58)  53 (08-02 @ 06:46)  53 (08-02 @ 06:45)  51 (08-02 @ 06:33)  51 (08-02 @ 06:32)  77 (08-02 @ 01:34)  125 (08-01 @ 21:17)  117 (08-01 @ 18:00)  99 (08-01 @ 13:40)  198 (08-01 @ 07:56)  188 (08-01 @ 05:30)  156 (08-01 @ 03:00)  156 (08-01 @ 01:15)  170 (07-31 @ 21:35)  111 (07-31 @ 16:56)  179 (07-31 @ 13:16)  113 (07-31 @ 09:22)  90 (07-31 @ 07:49)  116 (07-31 @ 05:02)  89 (07-31 @ 01:15)  152 (07-30 @ 22:17)                            8.7    5.57  )-----------( 299      ( 02 Aug 2017 08:37 )             27.8       08-02    136  |  95<L>  |  14  ----------------------------<  51<L>  4.0   |  24  |  5.02<H>    EGFR if : 10<L>  EGFR if non : 9<L>    Ca    7.7<L>      08-02  Mg     2.3     08-02  Phos  3.1     08-02    TPro  6.3  /  Alb  3.5  /  TBili  0.3  /  DBili  x   /  AST  19  /  ALT  9<L>  /  AlkPhos  213<H>  07-31      Thyroid Function Tests:      Hemoglobin A1C, Whole Blood: 6.8 % <H> [4.0 - 5.6] (07-24-17 @ 06:15)  Hemoglobin A1C, Whole Blood: 6.8 % <H> [4.0 - 5.6] (07-23-17 @ 22:45)  Hemoglobin A1C, Whole Blood: 7.3 % <H> [4.0 - 5.6] (06-16-17 @ 04:45)

## 2017-08-02 NOTE — PROGRESS NOTE ADULT - SUBJECTIVE AND OBJECTIVE BOX
Patient is a 62y old  Female who presents with a chief complaint of     SUBJECTIVE / OVERNIGHT EVENTS: Comfortable without new complaints. Family at bedside.   Review of Systems  chest pain no  palpitations no  sob no  nausea no  headache no    MEDICATIONS  (STANDING):  DULoxetine 60 milliGRAM(s) Oral daily  atorvastatin 20 milliGRAM(s) Oral at bedtime  clopidogrel Tablet 75 milliGRAM(s) Oral daily  aspirin enteric coated 81 milliGRAM(s) Oral daily  cinacalcet 60 milliGRAM(s) Oral daily  hydrALAZINE 100 milliGRAM(s) Oral every 8 hours  dextrose 50% Injectable 50 milliLiter(s) IV Push every 15 minutes  dextrose 50% Injectable 25 milliLiter(s) IV Push every 15 minutes  senna 2 Tablet(s) Oral at bedtime  docusate sodium 100 milliGRAM(s) Oral three times a day  dextrose 5%. 1000 milliLiter(s) (50 mL/Hr) IV Continuous <Continuous>  sevelamer hydrochloride 2400 milliGRAM(s) Oral three times a day with meals  heparin  Injectable 5000 Unit(s) SubCutaneous every 8 hours  insulin lispro (HumaLOG) Pump 1 Each SubCutaneous Continuous Pump  metoprolol succinate  milliGRAM(s) Oral daily    MEDICATIONS  (PRN):  acetaminophen   Tablet. 650 milliGRAM(s) Oral every 6 hours PRN Mild and/or moderate Pain  ondansetron Injectable 4 milliGRAM(s) IV Push every 6 hours PRN Nausea and/or Vomiting  dextrose Gel 1 Dose(s) Oral once PRN Blood Glucose LESS THAN 70 milliGRAM(s)/deciLiter  glucagon  Injectable 1 milliGRAM(s) IntraMuscular once PRN Glucose <70 milliGRAM(s)/deciLiter          PHYSICAL EXAM:  GENERAL: NAD, well-developed  HEAD:  Atraumatic, Normocephalic  EYES: EOMI, PERRLA, conjunctiva and sclera clear  NECK: Supple, No JVD  CHEST/LUNG: Clear to auscultation bilaterally; No wheeze  HEART: Regular rate and rhythm; No murmurs, rubs, or gallops  ABDOMEN: Soft, Nontender, Nondistended; Bowel sounds present  EXTREMITIES:  2+ Peripheral Pulses, No clubbing, cyanosis, or edema  PSYCH: AAOx3  NEUROLOGY: non-focal  SKIN: No rashes or lesions    LABS:                        8.7    5.57  )-----------( 299      ( 02 Aug 2017 08:37 )             27.8     08-02    136  |  95<L>  |  14  ----------------------------<  51<L>  4.0   |  24  |  5.02<H>    Ca    7.7<L>      02 Aug 2017 07:01  Phos  3.1     08-02  Mg     2.3     08-02                RADIOLOGY & ADDITIONAL TESTS:    Imaging Personally Reviewed:    Consultant(s) Notes Reviewed:      Care Discussed with Consultants/Other Providers:

## 2017-08-02 NOTE — PROGRESS NOTE ADULT - ASSESSMENT
62 f with DKA / diabetes Mellitus type 1  BS control/ endocrine follow. DKA resolvig- restart insulin pump/ bridge with Insulin gtt.  s/p NSTEMI-  asa/Plavix. Cardiology follow, pain control. s/p Catheterization with new RCA ostial stent..  ESRD- continue HD  Peripheral Vascular disease- conservative management.  DCP home after HD if stable tomorrow.  Pierce Viera MD pager [phone]

## 2017-08-02 NOTE — PROVIDER CONTACT NOTE (OTHER) - ASSESSMENT
Pt ANOx4, able to make needs known. Morning fingerstick was 51, repeat it was 51. Pt ANOx4, able to make needs known. Morning fingerstick was 51, repeat it was 51. Pt asymptomatic. Pt ANOx4, able to make needs known. Morning fingerstick was 53, repeat it was 5. Pt asymptomatic.

## 2017-08-02 NOTE — PROVIDER CONTACT NOTE (OTHER) - SITUATION
Admitted to to MICU on 7/24 w/DKA and placed on insulin drip. Transferred back to floor on 7/25. AM 7/26 had FS 24 on floor and started on D5W, MICU consulted for glucose in 300's in afternoon, gap

## 2017-08-03 ENCOUNTER — TRANSCRIPTION ENCOUNTER (OUTPATIENT)
Age: 62
End: 2017-08-03

## 2017-08-03 VITALS
TEMPERATURE: 98 F | OXYGEN SATURATION: 94 % | RESPIRATION RATE: 18 BRPM | SYSTOLIC BLOOD PRESSURE: 159 MMHG | HEART RATE: 67 BPM | DIASTOLIC BLOOD PRESSURE: 69 MMHG

## 2017-08-03 LAB
ANION GAP SERPL CALC-SCNC: 18 MMOL/L — HIGH (ref 5–17)
BASOPHILS # BLD AUTO: 0.02 K/UL — SIGNIFICANT CHANGE UP (ref 0–0.2)
BASOPHILS NFR BLD AUTO: 0.3 % — SIGNIFICANT CHANGE UP (ref 0–2)
BUN SERPL-MCNC: 20 MG/DL — SIGNIFICANT CHANGE UP (ref 7–23)
CALCIUM SERPL-MCNC: 8.1 MG/DL — LOW (ref 8.4–10.5)
CHLORIDE SERPL-SCNC: 92 MMOL/L — LOW (ref 96–108)
CO2 SERPL-SCNC: 24 MMOL/L — SIGNIFICANT CHANGE UP (ref 22–31)
CREAT SERPL-MCNC: 7.08 MG/DL — HIGH (ref 0.5–1.3)
EOSINOPHIL # BLD AUTO: 0.11 K/UL — SIGNIFICANT CHANGE UP (ref 0–0.5)
EOSINOPHIL NFR BLD AUTO: 1.5 % — SIGNIFICANT CHANGE UP (ref 0–6)
GLUCOSE SERPL-MCNC: 143 MG/DL — HIGH (ref 70–99)
HCT VFR BLD CALC: 29.5 % — LOW (ref 34.5–45)
HCT VFR BLD CALC: 29.8 % — LOW (ref 34.5–45)
HGB BLD-MCNC: 9.3 G/DL — LOW (ref 11.5–15.5)
HGB BLD-MCNC: 9.7 G/DL — LOW (ref 11.5–15.5)
IMM GRANULOCYTES NFR BLD AUTO: 0.3 % — SIGNIFICANT CHANGE UP (ref 0–1.5)
LYMPHOCYTES # BLD AUTO: 0.76 K/UL — LOW (ref 1–3.3)
LYMPHOCYTES # BLD AUTO: 10.7 % — LOW (ref 13–44)
MAGNESIUM SERPL-MCNC: 2.3 MG/DL — SIGNIFICANT CHANGE UP (ref 1.6–2.6)
MCHC RBC-ENTMCNC: 31.2 GM/DL — LOW (ref 32–36)
MCHC RBC-ENTMCNC: 31.4 PG — SIGNIFICANT CHANGE UP (ref 27–34)
MCHC RBC-ENTMCNC: 33 GM/DL — SIGNIFICANT CHANGE UP (ref 32–36)
MCHC RBC-ENTMCNC: 34 PG — SIGNIFICANT CHANGE UP (ref 27–34)
MCV RBC AUTO: 100.7 FL — HIGH (ref 80–100)
MCV RBC AUTO: 103 FL — HIGH (ref 80–100)
MONOCYTES # BLD AUTO: 0.84 K/UL — SIGNIFICANT CHANGE UP (ref 0–0.9)
MONOCYTES NFR BLD AUTO: 11.8 % — SIGNIFICANT CHANGE UP (ref 2–14)
NEUTROPHILS # BLD AUTO: 5.36 K/UL — SIGNIFICANT CHANGE UP (ref 1.8–7.4)
NEUTROPHILS NFR BLD AUTO: 75.4 % — SIGNIFICANT CHANGE UP (ref 43–77)
PHOSPHATE SERPL-MCNC: 3.3 MG/DL — SIGNIFICANT CHANGE UP (ref 2.5–4.5)
PLATELET # BLD AUTO: 297 K/UL — SIGNIFICANT CHANGE UP (ref 150–400)
PLATELET # BLD AUTO: 329 K/UL — SIGNIFICANT CHANGE UP (ref 150–400)
POTASSIUM SERPL-MCNC: 4.2 MMOL/L — SIGNIFICANT CHANGE UP (ref 3.5–5.3)
POTASSIUM SERPL-SCNC: 4.2 MMOL/L — SIGNIFICANT CHANGE UP (ref 3.5–5.3)
RBC # BLD: 2.86 M/UL — LOW (ref 3.8–5.2)
RBC # BLD: 2.96 M/UL — LOW (ref 3.8–5.2)
RBC # FLD: 13.2 % — SIGNIFICANT CHANGE UP (ref 10.3–14.5)
RBC # FLD: 14.3 % — SIGNIFICANT CHANGE UP (ref 10.3–14.5)
SODIUM SERPL-SCNC: 134 MMOL/L — LOW (ref 135–145)
WBC # BLD: 7.11 K/UL — SIGNIFICANT CHANGE UP (ref 3.8–10.5)
WBC # BLD: 7.3 K/UL — SIGNIFICANT CHANGE UP (ref 3.8–10.5)
WBC # FLD AUTO: 7.11 K/UL — SIGNIFICANT CHANGE UP (ref 3.8–10.5)
WBC # FLD AUTO: 7.3 K/UL — SIGNIFICANT CHANGE UP (ref 3.8–10.5)

## 2017-08-03 PROCEDURE — 99232 SBSQ HOSP IP/OBS MODERATE 35: CPT

## 2017-08-03 RX ORDER — OXYCODONE HYDROCHLORIDE 5 MG/1
1 TABLET ORAL
Qty: 0 | Refills: 0 | COMMUNITY
Start: 2017-08-03

## 2017-08-03 RX ORDER — DOCUSATE SODIUM 100 MG
1 CAPSULE ORAL
Qty: 0 | Refills: 0 | COMMUNITY
Start: 2017-08-03

## 2017-08-03 RX ORDER — ACETAMINOPHEN 500 MG
2 TABLET ORAL
Qty: 0 | Refills: 0 | DISCHARGE
Start: 2017-08-03

## 2017-08-03 RX ORDER — SENNA PLUS 8.6 MG/1
2 TABLET ORAL
Qty: 0 | Refills: 0 | COMMUNITY
Start: 2017-08-03

## 2017-08-03 RX ORDER — HYDRALAZINE HCL 50 MG
1 TABLET ORAL
Qty: 0 | Refills: 0 | COMMUNITY
Start: 2017-08-03

## 2017-08-03 RX ORDER — METOPROLOL TARTRATE 50 MG
1 TABLET ORAL
Qty: 0 | Refills: 0 | COMMUNITY
Start: 2017-08-03

## 2017-08-03 RX ORDER — INSULIN LISPRO 100/ML
0 VIAL (ML) SUBCUTANEOUS
Qty: 0 | Refills: 0 | COMMUNITY

## 2017-08-03 RX ORDER — METOPROLOL TARTRATE 50 MG
1 TABLET ORAL
Qty: 30 | Refills: 0 | OUTPATIENT
Start: 2017-08-03 | End: 2017-09-02

## 2017-08-03 RX ORDER — ERYTHROPOIETIN 10000 [IU]/ML
10000 INJECTION, SOLUTION INTRAVENOUS; SUBCUTANEOUS ONCE
Qty: 0 | Refills: 0 | Status: COMPLETED | OUTPATIENT
Start: 2017-08-03 | End: 2017-08-03

## 2017-08-03 RX ADMIN — OXYCODONE HYDROCHLORIDE 5 MILLIGRAM(S): 5 TABLET ORAL at 14:15

## 2017-08-03 RX ADMIN — SEVELAMER CARBONATE 2400 MILLIGRAM(S): 2400 POWDER, FOR SUSPENSION ORAL at 08:23

## 2017-08-03 RX ADMIN — Medication 100 MILLIGRAM(S): at 05:24

## 2017-08-03 RX ADMIN — OXYCODONE HYDROCHLORIDE 5 MILLIGRAM(S): 5 TABLET ORAL at 13:45

## 2017-08-03 RX ADMIN — OXYCODONE HYDROCHLORIDE 5 MILLIGRAM(S): 5 TABLET ORAL at 07:40

## 2017-08-03 RX ADMIN — OXYCODONE HYDROCHLORIDE 5 MILLIGRAM(S): 5 TABLET ORAL at 07:02

## 2017-08-03 RX ADMIN — CINACALCET 60 MILLIGRAM(S): 30 TABLET, FILM COATED ORAL at 13:25

## 2017-08-03 RX ADMIN — SEVELAMER CARBONATE 2400 MILLIGRAM(S): 2400 POWDER, FOR SUSPENSION ORAL at 13:25

## 2017-08-03 RX ADMIN — Medication 100 MILLIGRAM(S): at 13:25

## 2017-08-03 RX ADMIN — CLOPIDOGREL BISULFATE 75 MILLIGRAM(S): 75 TABLET, FILM COATED ORAL at 13:26

## 2017-08-03 RX ADMIN — DULOXETINE HYDROCHLORIDE 60 MILLIGRAM(S): 30 CAPSULE, DELAYED RELEASE ORAL at 13:26

## 2017-08-03 RX ADMIN — Medication 100 MILLIGRAM(S): at 05:25

## 2017-08-03 RX ADMIN — Medication 81 MILLIGRAM(S): at 13:26

## 2017-08-03 RX ADMIN — ERYTHROPOIETIN 10000 UNIT(S): 10000 INJECTION, SOLUTION INTRAVENOUS; SUBCUTANEOUS at 11:12

## 2017-08-03 NOTE — DISCHARGE NOTE ADULT - SECONDARY DIAGNOSIS.
NSTEMI (non-ST elevated myocardial infarction) ESRD (end stage renal disease) on dialysis Essential hypertension

## 2017-08-03 NOTE — PROGRESS NOTE ADULT - PROBLEM SELECTOR PROBLEM 2
ACS (acute coronary syndrome)
Hypoglycemic unawareness associated with type 1 diabetes mellitus
Hypoglycemic unawareness associated with type 1 diabetes mellitus
Insulin pump in place
ACS (acute coronary syndrome)
Insulin pump in place

## 2017-08-03 NOTE — PROGRESS NOTE ADULT - PROBLEM SELECTOR PLAN 2
Continue to watch sugars, currently on 80% temp basal through her procedure
Reviewed pump settings and made adjustments as noted above
Reviewed pump settings and no adjustments today
Yesterday reduced her doses per above as this is of great concern, particularly with CKD and recent NSTEMI.
cv stable  no active cp  once medically stable/improved will consider repeat cath in light of known high risk anatomy and likelihood of restenosis  cont asa/plavix    CHF  volume status- still mild overload  volume removal with HD
Continue to watch sugars, she will change pump site tonight
- cardiac enzymeas continuing to trend up, will continue to trend  - CE elevation may be 2/2 ESRD, but given CP on admission, cardiology was consulted by ED, will f/u recs  - c/w heparin gtt   - c/w ASA & Plavix
Will reduce her doses per above as this is of great concern, particularly with CKD and recent NSTEMI.

## 2017-08-03 NOTE — PROGRESS NOTE ADULT - PROBLEM SELECTOR PLAN 3
Continue overnight sugar checks
SETTINGS: Medtronic pump  Basal  MN 0.325 changed to 0.275  5 am 0.425 changed to 0.375    TDD basal now 8.5 units/day    CHO ratio  MN changed from 9 to 12  11 am changed from 10 to 12  3 pm changed from 9 to 11    Sens MN 60  7 am 40  6 pm 60  IOB 6 hours  Targets -130  8 am 110-120
bp tx per micu
Continue overnight sugar checks
- BP appropriate as of this a.m.
NEW SETTINGS: Medtronic pump  Basal  MN 0.325 changed to 0.275  5 am 0.425 changed to 0.375    TDD basal now 8.5 units/day    CHO ratio  MN changed from 9 to 12  11 am changed from 10 to 12  3 pm changed from 9 to 11    Sens MN 60  7 am 40  6 pm 60  IOB 6 hours  Targets -130  8 am 110-120

## 2017-08-03 NOTE — PROGRESS NOTE ADULT - SUBJECTIVE AND OBJECTIVE BOX
[facility]   [phone]  DIALYSIS NOTE  Chief Complaint: ESRD/Ongoing hemodialysis requirement. seen on hd    24 hour events/subjective:    states feels better        ALLERGIES & MEDICATIONS  --------------------------------------------------------------------------------  Allergies    No Known Allergies    Intolerances      Standing Inpatient Medications  DULoxetine 60 milliGRAM(s) Oral daily  atorvastatin 20 milliGRAM(s) Oral at bedtime  clopidogrel Tablet 75 milliGRAM(s) Oral daily  aspirin enteric coated 81 milliGRAM(s) Oral daily  cinacalcet 60 milliGRAM(s) Oral daily  hydrALAZINE 100 milliGRAM(s) Oral every 8 hours  dextrose 50% Injectable 50 milliLiter(s) IV Push every 15 minutes  dextrose 50% Injectable 25 milliLiter(s) IV Push every 15 minutes  senna 2 Tablet(s) Oral at bedtime  docusate sodium 100 milliGRAM(s) Oral three times a day  dextrose 5%. 1000 milliLiter(s) IV Continuous <Continuous>  sevelamer hydrochloride 2400 milliGRAM(s) Oral three times a day with meals  heparin  Injectable 5000 Unit(s) SubCutaneous every 8 hours  metoprolol succinate  milliGRAM(s) Oral daily  insulin lispro (HumaLOG) Pump 1 Each SubCutaneous Continuous Pump  epoetin azalea Injectable 39682 Unit(s) IV Push once    PRN Inpatient Medications  acetaminophen   Tablet. 650 milliGRAM(s) Oral every 6 hours PRN  ondansetron Injectable 4 milliGRAM(s) IV Push every 6 hours PRN  dextrose Gel 1 Dose(s) Oral once PRN  glucagon  Injectable 1 milliGRAM(s) IntraMuscular once PRN  oxyCODONE    IR 5 milliGRAM(s) Oral every 6 hours PRN      REVIEW OF SYSTEMS  --------------------------------------------------------------------------------  no itching or rash  no fever or chill  no cp or palp   no sob or cough   no N/V/D/ no abd pain   ext no edema no pain         VITALS/PHYSICAL EXAM  --------------------------------------------------------------------------------  T(C): 36.4 (08-03-17 @ 09:10), Max: 36.9 (08-03-17 @ 05:21)  HR: 72 (08-03-17 @ 09:10) (60 - 72)  BP: 129/68 (08-03-17 @ 09:10) (129/68 - 173/66)  RR: 18 (08-03-17 @ 09:10) (18 - 18)  SpO2: 98% (08-03-17 @ 09:10) (95% - 98%)  Wt(kg): --        08-02-17 @ 07:01  -  08-03-17 @ 07:00  --------------------------------------------------------  IN: 180 mL / OUT: 0 mL / NET: 180 mL      Physical Exam:  		    	Gen: alert oriented place person and date   	Pulm: Decreased breath sounds b/l bases no rales or ronchi  	CV: RRR, S1/S2  	Abd: +BS, soft, nontender/nondistended  	Extremity: No cyanosis, no edema no clubbing  	Neuro: No focal deficits  	    LABS/STUDIES  --------------------------------------------------------------------------------              9.3    7.11  >-----------<  329      [08-03-17 @ 09:08]              29.8     134  |  92  |  20  ----------------------------<  143      [08-03-17 @ 07:20]  4.2   |  24  |  7.08        Ca     8.1     [08-03-17 @ 07:20]      Mg     2.3     [08-03-17 @ 07:20]      Phos  3.3     [08-03-17 @ 07:20]                    imp/suggest: ESRD      Hemodialysis Prescription:  	Access: avf  	Dialyzer: revaclear 300  	Blood Flow (mL/Min): 400  	Dialysate Flow (mL/Min): 600  	Target UF (Liters): 2-2.5 liter as tolerated  	Treatment Time: 210m  	Potassium: 2k  	Calcium: 2.5  	  YOLANDA epogenj [id number] ivp     Vitamin D     continue with hd   see hd flow sheet

## 2017-08-03 NOTE — DISCHARGE NOTE ADULT - PLAN OF CARE
resolution of symptoms HgA1C this admission - 6.8  Make sure you get your HgA1c checked every three months.  If you take oral diabetes medications, check your blood glucose two times a day.  If you take insulin, check your blood glucose before meals and at bedtime.  It's important not to skip any meals.  Keep a log of your blood glucose results and always take it with you to your doctor appointments.  Keep a list of your current medications including injectables and over the counter medications and bring this medication list with you to all your doctor appointments.  If you have not seen your ophthalmologist this year call for appointment.  Check your feet daily for redness, sores, or openings. Do not self treat. If no improvement in two days call your primary care physician for an appointment.  Low blood sugar (hypoglycemia) is a blood sugar below 70mg/dl. Check your blood sugar if you feel signs/symptoms of hypoglycemia. If your blood sugar is below 70 take 15 grams of carbohydrates (ex 4 oz of apple juice, 3-4 glucose tablets, or 4-6 oz of regular soda) wait 15 minutes and repeat blood sugar to make sure it comes up above 70.  If your blood sugar is above 70 and you are due for a meal, have a meal.  If you are not due for a meal have a snack.  This snack helps keeps your blood sugar at a safe range. Call your doctor if you have unusual chest pain, pressure, or discomfort, shortness of breath, nausea, vomiting, burping, heartburn, tingling upper body parts, sweating, cold, clammy sking, racing heartbeat  Call 911 if you think you are having a heart attack  Take all cardiac medications as prescribed - notify your doctor if you have any side effects  Follow cardiac diet - avoid fatty & fried foods, don't eat too much red meat, eat lots of fruits & vegetables, dairy products should be low fat  Lose weight if you are overweight  Become more active with walking, gardening, or any other activity that gets you to move Continue HD schedule   Avoid taking (NSAIDs) - (ex: Ibuprofen, Advil, Celebrex, Naprosyn)  Avoid taking any nephrotoxic agents (can harm kidneys) - Intravenous contrast for diagnostic testing, combination cold medications.  Have all medications adjusted for your renal function by your Health Care Provider.  Blood pressure control is important.  Take all medication as prescribed. Take medications for your blood pressure as recommended.  Eat a heart healthy diet that is low in saturated fats and salt, and includes whole grains, fruits, vegetables and lean protein   Exercise regularly (consult with your physician or cardiologist first); maintain a heart healthy weight.   If you smoke - quit (A resource to help you stop smoking is the [facility] – phone number [phone].). Continue to follow with your primary physician or cardiologist.   Seek medical help for dizziness, Lightheadedness, Blurry vision, Headache, Chest pain, Shortness of breath  Follow up with your medical doctor to establish long term blood pressure treatment goals. HgA1C this admission - 6.8  Make sure you get your HgA1c checked every three months.  If you take insulin, check your blood glucose before meals and at bedtime.  It's important not to skip any meals.  Keep a log of your blood glucose results and always take it with you to your doctor appointments.  Keep a list of your current medications including injectables and over the counter medications and bring this medication list with you to all your doctor appointments.  If you have not seen your ophthalmologist this year call for appointment.  Check your feet daily for redness, sores, or openings. Do not self treat. If no improvement in two days call your primary care physician for an appointment.  Low blood sugar (hypoglycemia) is a blood sugar below 70mg/dl. Check your blood sugar if you feel signs/symptoms of hypoglycemia. If your blood sugar is below 70 take 15 grams of carbohydrates (ex 4 oz of apple juice, 3-4 glucose tablets, or 4-6 oz of regular soda) wait 15 minutes and repeat blood sugar to make sure it comes up above 70.  If your blood sugar is above 70 and you are due for a meal, have a meal.  If you are not due for a meal have a snack.  This snack helps keeps your blood sugar at a safe range.

## 2017-08-03 NOTE — PROGRESS NOTE ADULT - SUBJECTIVE AND OBJECTIVE BOX
Chief Complaint: type 1 diabetes    Interval history: After modiofication yesterday for AM low BG, sugars much improved over past 24 hours.      MEDICATIONS  (STANDING):  DULoxetine 60 milliGRAM(s) Oral daily  atorvastatin 20 milliGRAM(s) Oral at bedtime  clopidogrel Tablet 75 milliGRAM(s) Oral daily  aspirin enteric coated 81 milliGRAM(s) Oral daily  cinacalcet 60 milliGRAM(s) Oral daily  hydrALAZINE 100 milliGRAM(s) Oral every 8 hours  dextrose 50% Injectable 50 milliLiter(s) IV Push every 15 minutes  dextrose 50% Injectable 25 milliLiter(s) IV Push every 15 minutes  senna 2 Tablet(s) Oral at bedtime  docusate sodium 100 milliGRAM(s) Oral three times a day  dextrose 5%. 1000 milliLiter(s) (50 mL/Hr) IV Continuous <Continuous>  sevelamer hydrochloride 2400 milliGRAM(s) Oral three times a day with meals  heparin  Injectable 5000 Unit(s) SubCutaneous every 8 hours  metoprolol succinate  milliGRAM(s) Oral daily  insulin lispro (HumaLOG) Pump 1 Each SubCutaneous Continuous Pump    MEDICATIONS  (PRN):  acetaminophen   Tablet. 650 milliGRAM(s) Oral every 6 hours PRN Mild and/or moderate Pain  ondansetron Injectable 4 milliGRAM(s) IV Push every 6 hours PRN Nausea and/or Vomiting  dextrose Gel 1 Dose(s) Oral once PRN Blood Glucose LESS THAN 70 milliGRAM(s)/deciLiter  glucagon  Injectable 1 milliGRAM(s) IntraMuscular once PRN Glucose <70 milliGRAM(s)/deciLiter  oxyCODONE    IR 5 milliGRAM(s) Oral every 6 hours PRN Severe Pain (7 - 10)      Allergies    No Known Allergies    Intolerances      Review of Systems:  Endocrine: no polyuria, polydipsia    ALL OTHER SYSTEMS REVIEWED AND NEGATIVE      PHYSICAL EXAM:  VITALS: T(C): 36.8 (08-03-17 @ 14:21)  T(F): 98.2 (08-03-17 @ 14:21), Max: 98.5 (08-03-17 @ 05:21)  HR: 67 (08-03-17 @ 14:21) (63 - 72)  BP: 159/69 (08-03-17 @ 14:21) (129/68 - 173/66)  RR:  (18 - 18)  SpO2:  (94% - 98%)  Wt(kg): --  GENERAL: NAD, well-groomed, well-developed  EYES: No proptosis, no lid lag, anicteric  SKIN: Dry, intact, No rashes or lesions  PSYCH: Alert and oriented x 3, normal affect, normal mood    CAPILLARY BLOOD GLUCOSE  90 (08-03 @ 13:29)  152 (08-03 @ 08:01)  132 (08-03 @ 05:21)  155 (08-03 @ 02:53)  196 (08-03 @ 01:05)  258 (08-02 @ 21:33)  177 (08-02 @ 18:09)  211 (08-02 @ 17:18)  51 (08-02 @ 16:57)  52 (08-02 @ 16:46)  60 (08-02 @ 16:45)  165 (08-02 @ 11:59)  208 (08-02 @ 07:32)  82 (08-02 @ 06:58)  53 (08-02 @ 06:46)  53 (08-02 @ 06:45)  51 (08-02 @ 06:33)  51 (08-02 @ 06:32)  77 (08-02 @ 01:34)  125 (08-01 @ 21:17)  117 (08-01 @ 18:00)  99 (08-01 @ 13:40)  198 (08-01 @ 07:56)  188 (08-01 @ 05:30)  156 (08-01 @ 03:00)  156 (08-01 @ 01:15)  170 (07-31 @ 21:35)  111 (07-31 @ 16:56)      08-03    134<L>  |  92<L>  |  20  ----------------------------<  143<H>  4.2   |  24  |  7.08<H>    EGFR if : 7<L>  EGFR if non : 6<L>    Ca    8.1<L>      08-03  Mg     2.3     08-03  Phos  3.3     08-03            Thyroid Function Tests:      Hemoglobin A1C, Whole Blood: 6.8 % <H> [4.0 - 5.6] (07-24-17 @ 06:15)  Hemoglobin A1C, Whole Blood: 6.8 % <H> [4.0 - 5.6] (07-23-17 @ 22:45)  Hemoglobin A1C, Whole Blood: 7.3 % <H> [4.0 - 5.6] (06-16-17 @ 04:45)

## 2017-08-03 NOTE — PROGRESS NOTE ADULT - ASSESSMENT
62 f with DKA / diabetes Mellitus type 1  BS control/ endocrine follow. DKA resolvig- restart insulin pump/ bridge with Insulin gtt.  s/p NSTEMI-  asa/Plavix. Cardiology follow, pain control. s/p Catheterization with new RCA ostial stent..  ESRD- continue HD  Peripheral Vascular disease- conservative management.  DC home after HD if stable and cleared by Endocrine.  Pierce Viera MD pager [phone]

## 2017-08-03 NOTE — DISCHARGE NOTE ADULT - HOSPITAL COURSE
to be completed by attending 62F PMHx T1DM on insulin pump, HTN, HLD, former smoker, gout, CAD s/p PCI, dCHF, PVD s/p L & R fem-pop bypass  graft,  multiple vascular surgery both leg w/ fem-pop bypass revisions , Subclavian vein stenosis, left: s/p stent, on ASA and Plavix,  ESRD on HD (Tu/Thr/Sat) via L AVF, CVA with memory deficit, depression, chronic pain management. Admitted to to MICU on 7/24 w/DKA and placed on insulin drip. Transferred back to floor on 7/25. AM 7/26 had FS 24 on floor and started on D5W, MICU consulted for glucose in 300's in afternoon, gap opened and readmitted to MICU for drip. Gap closed and went back to floor with lower settings on insulin pump.Dr. Colbert in endocrinologist  7/26; 1600 s/w MICU MD; Endocrine/Dr. Viera  want transfer/ Insulin gtt. MICU  will eval. pt. Unable to transfer to HD today due to hypoglycemia/ possible MICU transfer but will need HD tonight. Endocrine MD requests BMP Q 2hr until MICU eval. for further recs.  1815; BMP drawn  1845; called MICU; per MICU pt. accepted to unit.   7/28 transferred to [address] [place] [address]; HD tomorrow  7/30 Endo-on Insulin pump. hold bolus insulin if patient not eating        NSTEMI- Possibloe cardiac cath next week once medically optimzed   7/30 Cards- continue with ASA/pavix/ BB /statin.plan for possible cath 	tomorrow. NPO after MN    Spoke with Dr. Marie ( Endo)- pt to decrease basal insulin to 70 % when NPO, call Endo fellow in am for further instruction  7/31-8/1: Pt. was unaware how to adjust basal insulin. Spoke w/ Dr. Marie and she walked me through adjusting the insulin settings. Endocrine will need to be paged in AM to further adjust insulin pump. Will check fingerstick at 3AM and bolus if high.   C/o tooth pain. Oxycodone x 1 given. [facility] if pain persists in AM.   -BP is high likely secondary to pain. Oxycodone had little relief. Will give morphine 2mg IV x1.   8/1 :Pump will automatically switch back to default settings at 7 pm and pt can bolus/correct any time with current temp basal as well.  Pt saavy with pump.Endocrinologist aware .   8/2 : Patient had hypoglycemic event today . called endocrinology and adjusted insulin dosing .  8/3 d/c when cleared by endocrine    dx:	DKA  	NSTEMI, s/p stent RCA 8/1

## 2017-08-03 NOTE — PROGRESS NOTE ADULT - SUBJECTIVE AND OBJECTIVE BOX
Patient is a 62y old  Female who presents with a chief complaint of     SUBJECTIVE / OVERNIGHT EVENTS: Comfortable without new complaints.    Review of Systems  chest pain no  palpitations no  sob no  nausea no  headache no    MEDICATIONS  (STANDING):  DULoxetine 60 milliGRAM(s) Oral daily  atorvastatin 20 milliGRAM(s) Oral at bedtime  clopidogrel Tablet 75 milliGRAM(s) Oral daily  aspirin enteric coated 81 milliGRAM(s) Oral daily  cinacalcet 60 milliGRAM(s) Oral daily  hydrALAZINE 100 milliGRAM(s) Oral every 8 hours  dextrose 50% Injectable 50 milliLiter(s) IV Push every 15 minutes  dextrose 50% Injectable 25 milliLiter(s) IV Push every 15 minutes  senna 2 Tablet(s) Oral at bedtime  docusate sodium 100 milliGRAM(s) Oral three times a day  dextrose 5%. 1000 milliLiter(s) (50 mL/Hr) IV Continuous <Continuous>  sevelamer hydrochloride 2400 milliGRAM(s) Oral three times a day with meals  heparin  Injectable 5000 Unit(s) SubCutaneous every 8 hours  metoprolol succinate  milliGRAM(s) Oral daily  insulin lispro (HumaLOG) Pump 1 Each SubCutaneous Continuous Pump    MEDICATIONS  (PRN):  acetaminophen   Tablet. 650 milliGRAM(s) Oral every 6 hours PRN Mild and/or moderate Pain  ondansetron Injectable 4 milliGRAM(s) IV Push every 6 hours PRN Nausea and/or Vomiting  dextrose Gel 1 Dose(s) Oral once PRN Blood Glucose LESS THAN 70 milliGRAM(s)/deciLiter  glucagon  Injectable 1 milliGRAM(s) IntraMuscular once PRN Glucose <70 milliGRAM(s)/deciLiter  oxyCODONE    IR 5 milliGRAM(s) Oral every 6 hours PRN Severe Pain (7 - 10)          PHYSICAL EXAM:  GENERAL: NAD, well-developed  HEAD:  Atraumatic, Normocephalic  EYES: EOMI, PERRLA, conjunctiva and sclera clear  NECK: Supple, No JVD  CHEST/LUNG: Clear to auscultation bilaterally; No wheeze  HEART: Regular rate and rhythm; No murmurs, rubs, or gallops  ABDOMEN: Soft, Nontender, Nondistended; Bowel sounds present  EXTREMITIES:  2+ Peripheral Pulses, No clubbing, cyanosis, or edema  PSYCH: AAOx3  NEUROLOGY: non-focal  SKIN: No rashes or lesions    LABS:                        9.3    7.11  )-----------( 329      ( 03 Aug 2017 09:08 )             29.8     08-03    134<L>  |  92<L>  |  20  ----------------------------<  143<H>  4.2   |  24  |  7.08<H>    Ca    8.1<L>      03 Aug 2017 07:20  Phos  3.3     08-03  Mg     2.3     08-03                RADIOLOGY & ADDITIONAL TESTS:    Imaging Personally Reviewed:    Consultant(s) Notes Reviewed:      Care Discussed with Consultants/Other Providers:

## 2017-08-03 NOTE — DISCHARGE NOTE ADULT - PATIENT PORTAL LINK FT
“You can access the [facility] Patient Portal, offered by [facility], by registering with the following website: [email or web address]”

## 2017-08-03 NOTE — PROGRESS NOTE ADULT - PROBLEM SELECTOR PLAN 1
-Test BG ac/hs and 2am  -continue current settings  -Pt to change pump site  8/4/17  -Pt continue to use insulin pump while in hospital.

## 2017-08-03 NOTE — DISCHARGE NOTE ADULT - MEDICATION SUMMARY - MEDICATIONS TO TAKE
I will START or STAY ON the medications listed below when I get home from the hospital:    aspirin 81 mg oral delayed release tablet  -- 1 tab(s) by mouth once a day  -- Indication: For Cad    acetaminophen 325 mg oral tablet  -- 2 tab(s) by mouth every 6 hours, As needed, Mild and/or moderate Pain  -- Indication: For Pain    oxyCODONE 5 mg oral tablet  -- 1 tab(s) by mouth every 6 hours, As needed, Severe Pain (7 - 10)  -- Indication: For Pain    DULoxetine 60 mg oral delayed release capsule  -- 1 cap(s) by mouth once a day  -- Indication: For Depression    HumaLOG 100 units/mL subcutaneous solution  -- Humalog Pump  BASAL:  00:00 - 0.25 units/Hr  0500 -0.35 units/hr    Insulin to carb ratio  00:00 1U: 15gram    Insulin Sensitivity factor  00:00 ISF 60  07:00 ISF 40  18:00 ISF 60    Blood glucose target:  00:00 110-130  08:00 100-120    -- Indication: For Diabetes mellitus type 1    atorvastatin 20 mg oral tablet  -- 1 tab(s) by mouth once a day (at bedtime)  -- Indication: For Hld    clopidogrel 75 mg oral tablet  -- 1 tab(s) by mouth once a day (at bedtime)  -- Indication: For Cad    metoprolol succinate 100 mg oral tablet, extended release  -- 1 tab(s) by mouth once a day  -- Indication: For HTN (hypertension)    senna oral tablet  -- 2 tab(s) by mouth once a day (at bedtime)  -- Indication: For Constipation    docusate sodium 100 mg oral capsule  -- 1 cap(s) by mouth 3 times a day  -- Indication: For Constipation    cinacalcet 60 mg oral tablet  -- 1 tab(s) by mouth once a day  -- Indication: For ESRD (end stage renal disease) on dialysis    Renvela 800 mg oral tablet  -- 3 tab(s) by mouth 3 times a day  -- Indication: For ESRD (end stage renal disease) on dialysis    hydrALAZINE 100 mg oral tablet  -- 1 tab(s) by mouth every 8 hours  -- Indication: For HTN (hypertension)    Multiple Vitamins oral tablet  -- 1 tab(s) by mouth once a day  -- Indication: For vitamin

## 2017-08-03 NOTE — PROGRESS NOTE ADULT - PROBLEM SELECTOR PROBLEM 1
Diabetes mellitus type 1
Diabetic ketoacidosis without coma associated with type 1 diabetes mellitus
Type 1 diabetes mellitus with ketoacidosis without coma
Diabetic ketoacidosis without coma associated with type 1 diabetes mellitus

## 2017-08-03 NOTE — DISCHARGE NOTE ADULT - MEDICATION SUMMARY - MEDICATIONS TO CHANGE
I will SWITCH the dose or number of times a day I take the medications listed below when I get home from the hospital:    metoprolol succinate 50 mg oral tablet, extended release  -- 1 tab(s) by mouth once a day    HumaLOG 100 units/mL subcutaneous solution  -- Humalog Pump  BASAL:  00:00 - 0.35 units/Hr  03:30-0.5 units/hr  06:00-0.425 units/hr    Insulin to carb ratio  00:00 1U:9gram  11:00 1U:10gram  15:00 1U:9gram    Insulin Sensitivity factor  00:00 ISF 60  07:00 ISF 40  18:00 ISF 60    Blood glucose target:  00:00 110-130  08:00 100-120

## 2017-08-03 NOTE — PROGRESS NOTE ADULT - PROBLEM SELECTOR PROBLEM 3
HTN (hypertension)
Hypoglycemic unawareness associated with type 1 diabetes mellitus
Insulin pump titration
HTN (hypertension)
Hypoglycemic unawareness associated with type 1 diabetes mellitus
Insulin pump titration

## 2017-08-03 NOTE — DISCHARGE NOTE ADULT - ADDITIONAL INSTRUCTIONS
Follow up with PMD in 1 week  Follow up with Endocrinologist in 1 week  Follow up with Cardiologist in 1-2 weeks  Follow up with Nephrologist to continue HD schedule

## 2017-08-03 NOTE — DISCHARGE NOTE ADULT - CARE PLAN
Principal Discharge DX:	Diabetic ketoacidosis without coma associated with type 1 diabetes mellitus  Goal:	resolution of symptoms  Instructions for follow-up, activity and diet:	HgA1C this admission - 6.8  Make sure you get your HgA1c checked every three months.  If you take oral diabetes medications, check your blood glucose two times a day.  If you take insulin, check your blood glucose before meals and at bedtime.  It's important not to skip any meals.  Keep a log of your blood glucose results and always take it with you to your doctor appointments.  Keep a list of your current medications including injectables and over the counter medications and bring this medication list with you to all your doctor appointments.  If you have not seen your ophthalmologist this year call for appointment.  Check your feet daily for redness, sores, or openings. Do not self treat. If no improvement in two days call your primary care physician for an appointment.  Low blood sugar (hypoglycemia) is a blood sugar below 70mg/dl. Check your blood sugar if you feel signs/symptoms of hypoglycemia. If your blood sugar is below 70 take 15 grams of carbohydrates (ex 4 oz of apple juice, 3-4 glucose tablets, or 4-6 oz of regular soda) wait 15 minutes and repeat blood sugar to make sure it comes up above 70.  If your blood sugar is above 70 and you are due for a meal, have a meal.  If you are not due for a meal have a snack.  This snack helps keeps your blood sugar at a safe range.  Secondary Diagnosis:	NSTEMI (non-ST elevated myocardial infarction)  Instructions for follow-up, activity and diet:	Call your doctor if you have unusual chest pain, pressure, or discomfort, shortness of breath, nausea, vomiting, burping, heartburn, tingling upper body parts, sweating, cold, clammy sking, racing heartbeat  Call 911 if you think you are having a heart attack  Take all cardiac medications as prescribed - notify your doctor if you have any side effects  Follow cardiac diet - avoid fatty & fried foods, don't eat too much red meat, eat lots of fruits & vegetables, dairy products should be low fat  Lose weight if you are overweight  Become more active with walking, gardening, or any other activity that gets you to move  Secondary Diagnosis:	ESRD (end stage renal disease) on dialysis  Instructions for follow-up, activity and diet:	Continue HD schedule   Avoid taking (NSAIDs) - (ex: Ibuprofen, Advil, Celebrex, Naprosyn)  Avoid taking any nephrotoxic agents (can harm kidneys) - Intravenous contrast for diagnostic testing, combination cold medications.  Have all medications adjusted for your renal function by your Health Care Provider.  Blood pressure control is important.  Take all medication as prescribed.  Secondary Diagnosis:	Essential hypertension  Instructions for follow-up, activity and diet:	Take medications for your blood pressure as recommended.  Eat a heart healthy diet that is low in saturated fats and salt, and includes whole grains, fruits, vegetables and lean protein   Exercise regularly (consult with your physician or cardiologist first); maintain a heart healthy weight.   If you smoke - quit (A resource to help you stop smoking is the [facility] – phone number 673-182-9909.). Continue to follow with your primary physician or cardiologist.   Seek medical help for dizziness, Lightheadedness, Blurry vision, Headache, Chest pain, Shortness of breath  Follow up with your medical doctor to establish long term blood pressure treatment goals. Principal Discharge DX:	Diabetic ketoacidosis without coma associated with type 1 diabetes mellitus  Goal:	resolution of symptoms  Instructions for follow-up, activity and diet:	HgA1C this admission - 6.8  Make sure you get your HgA1c checked every three months.  If you take oral diabetes medications, check your blood glucose two times a day.  If you take insulin, check your blood glucose before meals and at bedtime.  It's important not to skip any meals.  Keep a log of your blood glucose results and always take it with you to your doctor appointments.  Keep a list of your current medications including injectables and over the counter medications and bring this medication list with you to all your doctor appointments.  If you have not seen your ophthalmologist this year call for appointment.  Check your feet daily for redness, sores, or openings. Do not self treat. If no improvement in two days call your primary care physician for an appointment.  Low blood sugar (hypoglycemia) is a blood sugar below 70mg/dl. Check your blood sugar if you feel signs/symptoms of hypoglycemia. If your blood sugar is below 70 take 15 grams of carbohydrates (ex 4 oz of apple juice, 3-4 glucose tablets, or 4-6 oz of regular soda) wait 15 minutes and repeat blood sugar to make sure it comes up above 70.  If your blood sugar is above 70 and you are due for a meal, have a meal.  If you are not due for a meal have a snack.  This snack helps keeps your blood sugar at a safe range.  Secondary Diagnosis:	NSTEMI (non-ST elevated myocardial infarction)  Instructions for follow-up, activity and diet:	Call your doctor if you have unusual chest pain, pressure, or discomfort, shortness of breath, nausea, vomiting, burping, heartburn, tingling upper body parts, sweating, cold, clammy sking, racing heartbeat  Call 911 if you think you are having a heart attack  Take all cardiac medications as prescribed - notify your doctor if you have any side effects  Follow cardiac diet - avoid fatty & fried foods, don't eat too much red meat, eat lots of fruits & vegetables, dairy products should be low fat  Lose weight if you are overweight  Become more active with walking, gardening, or any other activity that gets you to move  Secondary Diagnosis:	ESRD (end stage renal disease) on dialysis  Instructions for follow-up, activity and diet:	Continue HD schedule   Avoid taking (NSAIDs) - (ex: Ibuprofen, Advil, Celebrex, Naprosyn)  Avoid taking any nephrotoxic agents (can harm kidneys) - Intravenous contrast for diagnostic testing, combination cold medications.  Have all medications adjusted for your renal function by your Health Care Provider.  Blood pressure control is important.  Take all medication as prescribed.  Secondary Diagnosis:	Essential hypertension  Instructions for follow-up, activity and diet:	Take medications for your blood pressure as recommended.  Eat a heart healthy diet that is low in saturated fats and salt, and includes whole grains, fruits, vegetables and lean protein   Exercise regularly (consult with your physician or cardiologist first); maintain a heart healthy weight.   If you smoke - quit (A resource to help you stop smoking is the [facility] – phone number 931-537-6152.). Continue to follow with your primary physician or cardiologist.   Seek medical help for dizziness, Lightheadedness, Blurry vision, Headache, Chest pain, Shortness of breath  Follow up with your medical doctor to establish long term blood pressure treatment goals. Principal Discharge DX:	Diabetic ketoacidosis without coma associated with type 1 diabetes mellitus  Goal:	resolution of symptoms  Instructions for follow-up, activity and diet:	HgA1C this admission - 6.8  Make sure you get your HgA1c checked every three months.  If you take oral diabetes medications, check your blood glucose two times a day.  If you take insulin, check your blood glucose before meals and at bedtime.  It's important not to skip any meals.  Keep a log of your blood glucose results and always take it with you to your doctor appointments.  Keep a list of your current medications including injectables and over the counter medications and bring this medication list with you to all your doctor appointments.  If you have not seen your ophthalmologist this year call for appointment.  Check your feet daily for redness, sores, or openings. Do not self treat. If no improvement in two days call your primary care physician for an appointment.  Low blood sugar (hypoglycemia) is a blood sugar below 70mg/dl. Check your blood sugar if you feel signs/symptoms of hypoglycemia. If your blood sugar is below 70 take 15 grams of carbohydrates (ex 4 oz of apple juice, 3-4 glucose tablets, or 4-6 oz of regular soda) wait 15 minutes and repeat blood sugar to make sure it comes up above 70.  If your blood sugar is above 70 and you are due for a meal, have a meal.  If you are not due for a meal have a snack.  This snack helps keeps your blood sugar at a safe range.  Secondary Diagnosis:	NSTEMI (non-ST elevated myocardial infarction)  Instructions for follow-up, activity and diet:	Call your doctor if you have unusual chest pain, pressure, or discomfort, shortness of breath, nausea, vomiting, burping, heartburn, tingling upper body parts, sweating, cold, clammy sking, racing heartbeat  Call 911 if you think you are having a heart attack  Take all cardiac medications as prescribed - notify your doctor if you have any side effects  Follow cardiac diet - avoid fatty & fried foods, don't eat too much red meat, eat lots of fruits & vegetables, dairy products should be low fat  Lose weight if you are overweight  Become more active with walking, gardening, or any other activity that gets you to move  Secondary Diagnosis:	ESRD (end stage renal disease) on dialysis  Instructions for follow-up, activity and diet:	Continue HD schedule   Avoid taking (NSAIDs) - (ex: Ibuprofen, Advil, Celebrex, Naprosyn)  Avoid taking any nephrotoxic agents (can harm kidneys) - Intravenous contrast for diagnostic testing, combination cold medications.  Have all medications adjusted for your renal function by your Health Care Provider.  Blood pressure control is important.  Take all medication as prescribed.  Secondary Diagnosis:	Essential hypertension  Instructions for follow-up, activity and diet:	Take medications for your blood pressure as recommended.  Eat a heart healthy diet that is low in saturated fats and salt, and includes whole grains, fruits, vegetables and lean protein   Exercise regularly (consult with your physician or cardiologist first); maintain a heart healthy weight.   If you smoke - quit (A resource to help you stop smoking is the [facility] – phone number 423-002-1653.). Continue to follow with your primary physician or cardiologist.   Seek medical help for dizziness, Lightheadedness, Blurry vision, Headache, Chest pain, Shortness of breath  Follow up with your medical doctor to establish long term blood pressure treatment goals. Principal Discharge DX:	Diabetic ketoacidosis without coma associated with type 1 diabetes mellitus  Goal:	resolution of symptoms  Instructions for follow-up, activity and diet:	HgA1C this admission - 6.8  Make sure you get your HgA1c checked every three months.  If you take insulin, check your blood glucose before meals and at bedtime.  It's important not to skip any meals.  Keep a log of your blood glucose results and always take it with you to your doctor appointments.  Keep a list of your current medications including injectables and over the counter medications and bring this medication list with you to all your doctor appointments.  If you have not seen your ophthalmologist this year call for appointment.  Check your feet daily for redness, sores, or openings. Do not self treat. If no improvement in two days call your primary care physician for an appointment.  Low blood sugar (hypoglycemia) is a blood sugar below 70mg/dl. Check your blood sugar if you feel signs/symptoms of hypoglycemia. If your blood sugar is below 70 take 15 grams of carbohydrates (ex 4 oz of apple juice, 3-4 glucose tablets, or 4-6 oz of regular soda) wait 15 minutes and repeat blood sugar to make sure it comes up above 70.  If your blood sugar is above 70 and you are due for a meal, have a meal.  If you are not due for a meal have a snack.  This snack helps keeps your blood sugar at a safe range.  Secondary Diagnosis:	NSTEMI (non-ST elevated myocardial infarction)  Instructions for follow-up, activity and diet:	Call your doctor if you have unusual chest pain, pressure, or discomfort, shortness of breath, nausea, vomiting, burping, heartburn, tingling upper body parts, sweating, cold, clammy sking, racing heartbeat  Call 911 if you think you are having a heart attack  Take all cardiac medications as prescribed - notify your doctor if you have any side effects  Follow cardiac diet - avoid fatty & fried foods, don't eat too much red meat, eat lots of fruits & vegetables, dairy products should be low fat  Lose weight if you are overweight  Become more active with walking, gardening, or any other activity that gets you to move  Secondary Diagnosis:	ESRD (end stage renal disease) on dialysis  Instructions for follow-up, activity and diet:	Continue HD schedule   Avoid taking (NSAIDs) - (ex: Ibuprofen, Advil, Celebrex, Naprosyn)  Avoid taking any nephrotoxic agents (can harm kidneys) - Intravenous contrast for diagnostic testing, combination cold medications.  Have all medications adjusted for your renal function by your Health Care Provider.  Blood pressure control is important.  Take all medication as prescribed.  Secondary Diagnosis:	Essential hypertension  Instructions for follow-up, activity and diet:	Take medications for your blood pressure as recommended.  Eat a heart healthy diet that is low in saturated fats and salt, and includes whole grains, fruits, vegetables and lean protein   Exercise regularly (consult with your physician or cardiologist first); maintain a heart healthy weight.   If you smoke - quit (A resource to help you stop smoking is the [facility] – phone number 752-187-2321.). Continue to follow with your primary physician or cardiologist.   Seek medical help for dizziness, Lightheadedness, Blurry vision, Headache, Chest pain, Shortness of breath  Follow up with your medical doctor to establish long term blood pressure treatment goals. Principal Discharge DX:	Diabetic ketoacidosis without coma associated with type 1 diabetes mellitus  Goal:	resolution of symptoms  Instructions for follow-up, activity and diet:	HgA1C this admission - 6.8  Make sure you get your HgA1c checked every three months.  If you take insulin, check your blood glucose before meals and at bedtime.  It's important not to skip any meals.  Keep a log of your blood glucose results and always take it with you to your doctor appointments.  Keep a list of your current medications including injectables and over the counter medications and bring this medication list with you to all your doctor appointments.  If you have not seen your ophthalmologist this year call for appointment.  Check your feet daily for redness, sores, or openings. Do not self treat. If no improvement in two days call your primary care physician for an appointment.  Low blood sugar (hypoglycemia) is a blood sugar below 70mg/dl. Check your blood sugar if you feel signs/symptoms of hypoglycemia. If your blood sugar is below 70 take 15 grams of carbohydrates (ex 4 oz of apple juice, 3-4 glucose tablets, or 4-6 oz of regular soda) wait 15 minutes and repeat blood sugar to make sure it comes up above 70.  If your blood sugar is above 70 and you are due for a meal, have a meal.  If you are not due for a meal have a snack.  This snack helps keeps your blood sugar at a safe range.  Secondary Diagnosis:	NSTEMI (non-ST elevated myocardial infarction)  Instructions for follow-up, activity and diet:	Call your doctor if you have unusual chest pain, pressure, or discomfort, shortness of breath, nausea, vomiting, burping, heartburn, tingling upper body parts, sweating, cold, clammy sking, racing heartbeat  Call 911 if you think you are having a heart attack  Take all cardiac medications as prescribed - notify your doctor if you have any side effects  Follow cardiac diet - avoid fatty & fried foods, don't eat too much red meat, eat lots of fruits & vegetables, dairy products should be low fat  Lose weight if you are overweight  Become more active with walking, gardening, or any other activity that gets you to move  Secondary Diagnosis:	ESRD (end stage renal disease) on dialysis  Instructions for follow-up, activity and diet:	Continue HD schedule   Avoid taking (NSAIDs) - (ex: Ibuprofen, Advil, Celebrex, Naprosyn)  Avoid taking any nephrotoxic agents (can harm kidneys) - Intravenous contrast for diagnostic testing, combination cold medications.  Have all medications adjusted for your renal function by your Health Care Provider.  Blood pressure control is important.  Take all medication as prescribed.  Secondary Diagnosis:	Essential hypertension  Instructions for follow-up, activity and diet:	Take medications for your blood pressure as recommended.  Eat a heart healthy diet that is low in saturated fats and salt, and includes whole grains, fruits, vegetables and lean protein   Exercise regularly (consult with your physician or cardiologist first); maintain a heart healthy weight.   If you smoke - quit (A resource to help you stop smoking is the [facility] – phone number [phone].). Continue to follow with your primary physician or cardiologist.   Seek medical help for dizziness, Lightheadedness, Blurry vision, Headache, Chest pain, Shortness of breath  Follow up with your medical doctor to establish long term blood pressure treatment goals. Principal Discharge DX:	Diabetic ketoacidosis without coma associated with type 1 diabetes mellitus  Goal:	resolution of symptoms  Instructions for follow-up, activity and diet:	HgA1C this admission - 6.8  Make sure you get your HgA1c checked every three months.  If you take insulin, check your blood glucose before meals and at bedtime.  It's important not to skip any meals.  Keep a log of your blood glucose results and always take it with you to your doctor appointments.  Keep a list of your current medications including injectables and over the counter medications and bring this medication list with you to all your doctor appointments.  If you have not seen your ophthalmologist this year call for appointment.  Check your feet daily for redness, sores, or openings. Do not self treat. If no improvement in two days call your primary care physician for an appointment.  Low blood sugar (hypoglycemia) is a blood sugar below 70mg/dl. Check your blood sugar if you feel signs/symptoms of hypoglycemia. If your blood sugar is below 70 take 15 grams of carbohydrates (ex 4 oz of apple juice, 3-4 glucose tablets, or 4-6 oz of regular soda) wait 15 minutes and repeat blood sugar to make sure it comes up above 70.  If your blood sugar is above 70 and you are due for a meal, have a meal.  If you are not due for a meal have a snack.  This snack helps keeps your blood sugar at a safe range.  Secondary Diagnosis:	NSTEMI (non-ST elevated myocardial infarction)  Instructions for follow-up, activity and diet:	Call your doctor if you have unusual chest pain, pressure, or discomfort, shortness of breath, nausea, vomiting, burping, heartburn, tingling upper body parts, sweating, cold, clammy sking, racing heartbeat  Call 911 if you think you are having a heart attack  Take all cardiac medications as prescribed - notify your doctor if you have any side effects  Follow cardiac diet - avoid fatty & fried foods, don't eat too much red meat, eat lots of fruits & vegetables, dairy products should be low fat  Lose weight if you are overweight  Become more active with walking, gardening, or any other activity that gets you to move  Secondary Diagnosis:	ESRD (end stage renal disease) on dialysis  Instructions for follow-up, activity and diet:	Continue HD schedule   Avoid taking (NSAIDs) - (ex: Ibuprofen, Advil, Celebrex, Naprosyn)  Avoid taking any nephrotoxic agents (can harm kidneys) - Intravenous contrast for diagnostic testing, combination cold medications.  Have all medications adjusted for your renal function by your Health Care Provider.  Blood pressure control is important.  Take all medication as prescribed.  Secondary Diagnosis:	Essential hypertension  Instructions for follow-up, activity and diet:	Take medications for your blood pressure as recommended.  Eat a heart healthy diet that is low in saturated fats and salt, and includes whole grains, fruits, vegetables and lean protein   Exercise regularly (consult with your physician or cardiologist first); maintain a heart healthy weight.   If you smoke - quit (A resource to help you stop smoking is the [facility] – phone number 000-425-0986.). Continue to follow with your primary physician or cardiologist.   Seek medical help for dizziness, Lightheadedness, Blurry vision, Headache, Chest pain, Shortness of breath  Follow up with your medical doctor to establish long term blood pressure treatment goals.

## 2017-08-03 NOTE — DISCHARGE NOTE ADULT - CARE PROVIDER_API CALL
Mauro Vela), Cardiovascular Disease; Internal Medicine; Interventional Cardiology  [address] [address]  Phone: [phone]  Fax: [phone] Mauro Vela), Cardiovascular Disease; Internal Medicine; Interventional Cardiology  [address] [facility]  [address]  [place] [address]  Phone: [phone]  Fax: [phone]    Pina Ley (SUSAN), EndocrinologyMetabDiabetes  [address] [place] [id number]  Phone: [phone]  Fax: [phone]

## 2017-08-17 ENCOUNTER — INPATIENT (INPATIENT)
Facility: HOSPITAL | Age: 62
LOS: 4 days | Discharge: HOME | DRG: 280 | End: 2017-08-22
Attending: INTERNAL MEDICINE | Admitting: INTERNAL MEDICINE
Payer: MEDICARE

## 2017-08-17 VITALS
TEMPERATURE: 99 F | OXYGEN SATURATION: 95 % | HEART RATE: 75 BPM | DIASTOLIC BLOOD PRESSURE: 74 MMHG | SYSTOLIC BLOOD PRESSURE: 195 MMHG | RESPIRATION RATE: 17 BRPM

## 2017-08-17 DIAGNOSIS — Z98.89 OTHER SPECIFIED POSTPROCEDURAL STATES: Chronic | ICD-10-CM

## 2017-08-17 DIAGNOSIS — R06.00 DYSPNEA, UNSPECIFIED: ICD-10-CM

## 2017-08-17 LAB
ALBUMIN SERPL ELPH-MCNC: 4.1 G/DL — SIGNIFICANT CHANGE UP (ref 3.3–5)
ALP SERPL-CCNC: 246 U/L — HIGH (ref 40–120)
ALT FLD-CCNC: 60 U/L RC — HIGH (ref 10–45)
ANION GAP SERPL CALC-SCNC: 18 MMOL/L — HIGH (ref 5–17)
APTT BLD: 28.5 SEC — SIGNIFICANT CHANGE UP (ref 27.5–37.4)
AST SERPL-CCNC: 49 U/L — HIGH (ref 10–40)
BASOPHILS # BLD AUTO: 0 K/UL — SIGNIFICANT CHANGE UP (ref 0–0.2)
BASOPHILS NFR BLD AUTO: 0.2 % — SIGNIFICANT CHANGE UP (ref 0–2)
BILIRUB SERPL-MCNC: 0.4 MG/DL — SIGNIFICANT CHANGE UP (ref 0.2–1.2)
BUN SERPL-MCNC: 22 MG/DL — SIGNIFICANT CHANGE UP (ref 7–23)
CALCIUM SERPL-MCNC: 7.9 MG/DL — LOW (ref 8.4–10.5)
CHLORIDE SERPL-SCNC: 92 MMOL/L — LOW (ref 96–108)
CO2 SERPL-SCNC: 28 MMOL/L — SIGNIFICANT CHANGE UP (ref 22–31)
CREAT SERPL-MCNC: 3.92 MG/DL — HIGH (ref 0.5–1.3)
EOSINOPHIL # BLD AUTO: 0.1 K/UL — SIGNIFICANT CHANGE UP (ref 0–0.5)
EOSINOPHIL NFR BLD AUTO: 1.6 % — SIGNIFICANT CHANGE UP (ref 0–6)
GAS PNL BLDV: SIGNIFICANT CHANGE UP
GLUCOSE SERPL-MCNC: 160 MG/DL — HIGH (ref 70–99)
HCG SERPL QL: NEGATIVE — SIGNIFICANT CHANGE UP
HCT VFR BLD CALC: 27.7 % — LOW (ref 34.5–45)
HGB BLD-MCNC: 9.1 G/DL — LOW (ref 11.5–15.5)
INR BLD: 1.14 RATIO — SIGNIFICANT CHANGE UP (ref 0.88–1.16)
LYMPHOCYTES # BLD AUTO: 0.7 K/UL — LOW (ref 1–3.3)
LYMPHOCYTES # BLD AUTO: 10.4 % — LOW (ref 13–44)
MAGNESIUM SERPL-MCNC: 2 MG/DL — SIGNIFICANT CHANGE UP (ref 1.6–2.6)
MCHC RBC-ENTMCNC: 32.8 GM/DL — SIGNIFICANT CHANGE UP (ref 32–36)
MCHC RBC-ENTMCNC: 33.4 PG — SIGNIFICANT CHANGE UP (ref 27–34)
MCV RBC AUTO: 102 FL — HIGH (ref 80–100)
MONOCYTES # BLD AUTO: 0.4 K/UL — SIGNIFICANT CHANGE UP (ref 0–0.9)
MONOCYTES NFR BLD AUTO: 5.5 % — SIGNIFICANT CHANGE UP (ref 2–14)
NEUTROPHILS # BLD AUTO: 5.3 K/UL — SIGNIFICANT CHANGE UP (ref 1.8–7.4)
NEUTROPHILS NFR BLD AUTO: 82.3 % — HIGH (ref 43–77)
NT-PROBNP SERPL-SCNC: HIGH PG/ML (ref 0–300)
PHOSPHATE SERPL-MCNC: 2.9 MG/DL — SIGNIFICANT CHANGE UP (ref 2.5–4.5)
PLATELET # BLD AUTO: 209 K/UL — SIGNIFICANT CHANGE UP (ref 150–400)
POTASSIUM SERPL-MCNC: 4.3 MMOL/L — SIGNIFICANT CHANGE UP (ref 3.5–5.3)
POTASSIUM SERPL-SCNC: 4.3 MMOL/L — SIGNIFICANT CHANGE UP (ref 3.5–5.3)
PROT SERPL-MCNC: 6.9 G/DL — SIGNIFICANT CHANGE UP (ref 6–8.3)
PROTHROM AB SERPL-ACNC: 12.4 SEC — SIGNIFICANT CHANGE UP (ref 9.8–12.7)
RBC # BLD: 2.71 M/UL — LOW (ref 3.8–5.2)
RBC # FLD: 13.1 % — SIGNIFICANT CHANGE UP (ref 10.3–14.5)
SODIUM SERPL-SCNC: 138 MMOL/L — SIGNIFICANT CHANGE UP (ref 135–145)
TROPONIN T SERPL-MCNC: 0.1 NG/ML — HIGH (ref 0–0.06)
WBC # BLD: 6.5 K/UL — SIGNIFICANT CHANGE UP (ref 3.8–10.5)
WBC # FLD AUTO: 6.5 K/UL — SIGNIFICANT CHANGE UP (ref 3.8–10.5)

## 2017-08-17 PROCEDURE — 74177 CT ABD & PELVIS W/CONTRAST: CPT | Mod: 26

## 2017-08-17 PROCEDURE — 99223 1ST HOSP IP/OBS HIGH 75: CPT

## 2017-08-17 PROCEDURE — 71010: CPT | Mod: 26

## 2017-08-17 PROCEDURE — 71275 CT ANGIOGRAPHY CHEST: CPT | Mod: 26

## 2017-08-17 PROCEDURE — 99285 EMERGENCY DEPT VISIT HI MDM: CPT | Mod: GC

## 2017-08-17 RX ORDER — NITROGLYCERIN 6.5 MG
1 CAPSULE, EXTENDED RELEASE ORAL ONCE
Qty: 0 | Refills: 0 | Status: COMPLETED | OUTPATIENT
Start: 2017-08-17 | End: 2017-08-17

## 2017-08-17 RX ORDER — HYDRALAZINE HCL 50 MG
100 TABLET ORAL ONCE
Qty: 0 | Refills: 0 | Status: COMPLETED | OUTPATIENT
Start: 2017-08-17 | End: 2017-08-17

## 2017-08-17 RX ADMIN — Medication 100 MILLIGRAM(S): at 20:29

## 2017-08-17 RX ADMIN — Medication 1 INCH(S): at 20:29

## 2017-08-17 NOTE — ED ADULT NURSE NOTE - OBJECTIVE STATEMENT
62 year old female presents to ED c/o sob and fatigue since last night. Patient gets dialysis tuesday, thursday, saturday and completed it today. (+) thrill to L arm fistula.  Patient went to vascular physician today after dialysis and was directed to go to ED for symptoms. Patient was recently discharged for her 5 cardiac stent placed 2 weeks ago. Patient denies fever, cp or dizziness. Lung sounds clear. Abd nondistended tender to lower abd area with diabetic insulin pump in place. Skin warm and dry.  at bedside. Patient appears fatigued.

## 2017-08-17 NOTE — ED PROVIDER NOTE - OBJECTIVE STATEMENT
61yo female PMH acute coronary syndrome, Coronary Artery Disease s/p stent 2 weeks ago, CVA, DM, ESRD on HD Tu,Th, Sat, gout, hyperlipidemia, PVD, TIA presenting with shortness of breath, feeling lightheaded since last night. Went to HD today and  then went to her doctor's office afterward, Dr. Elizalde. Patient states that she felt very lightheaded and felt like she was going to pass out.     PMD: Dr. Ayala  Cards: Dr. Tee Biswas 61yo female PMH acute coronary syndrome, Coronary Artery Disease s/p stent 2 weeks ago, CVA, DM, ESRD on HD Tu,Th, Sat, gout, hyperlipidemia, PVD, TIA presenting with shortness of breath, feeling lightheaded since last night. Went to HD today and  then went to her doctor's office afterward, Dr. Elizalde. Patient states that she felt very lightheaded and felt like she was going to pass out. At that time SBP>200. Patient was then sent to ED for evaluation. Here, patient c/o epigastric pain, shortness of breath, headache, No fevers or chills. No diarrhea. Patient urinates a small amount once per day but denies dysuria.     PMD: Dr. Ayala  Cards: Dr. Tee Biswas

## 2017-08-17 NOTE — ED PROVIDER NOTE - PROGRESS NOTE DETAILS
Spoke with endocrine fellow. Patient has adequate insulin coverage for next 2 days with pump.  will return to hospital tomorrow morning with more supplies. If patient becomes unconscious, will remove pump and give 7 units of Lantus. Niki Caldera DO Brady Yuan, PGY2: Pt received at sign out. CTs negative. Admitting to Dr Viera, paged Dr. Burger nephrology.

## 2017-08-17 NOTE — ED ADULT NURSE REASSESSMENT NOTE - NS ED NURSE REASSESS COMMENT FT1
MD reyes spoke with endocrine fellow Tiffani about patients insulin pump.  Patient educated about insulin pump.

## 2017-08-17 NOTE — ED PROVIDER NOTE - PMH
ACS (acute coronary syndrome)  1/2015 - cath revealed 100% ostial stenosis not amenable to PCI - medical management  CAD (coronary artery disease)  s/p stents  Cellulitis  2015 left foot  CVA (cerebral infarction)  with no residual, 8 yrs ago, prior to heart surgery - ST memory loss  Diabetes mellitus type 1  Insulin Dependent - Medtronic  Minimed Paradigm Insulin Pump - Novolog  DKA, type 1  1/2015  ESRD (end stage renal disease)  dialysis , tue, thursday, saturday  Gout  past  Hyperlipidemia    MI, old    Murmur, cardiac    Peripheral vascular disease  occluded left fem-pop bypass 5/2015  Subclavian vein stenosis, left  s/p stent  TIA (transient ischemic attack)  x 2 - 8-9 years ago prior to ASD/VSD repair

## 2017-08-17 NOTE — ED PROVIDER NOTE - MEDICAL DECISION MAKING DETAILS
61yo female PMH ESRD, Coronary Artery Disease, DM1 on insulin pump p/w shortness of breath and hypertension, concerning for ACS/Hypertensive urgency/Flash pulmonary edema, will obtain labs, give nitroglycerin, hydralazine, obtain cardiac enzymes, CTA chest to r/o PE, CT a/p to r/o intra-abdominal pathology, admission for further workup. Niki Caldera DO

## 2017-08-17 NOTE — ED PROVIDER NOTE - PHYSICAL EXAMINATION
Gen: tachypneic, uncomfortable  Head: NCAT  Lung: CTAB, tachypneic, no wheezing, rales, rhonchi  CV: normal s1/s2, rrr, no murmurs, Normal perfusion, pulses 2+ throughout  Abd: +TTP epigastric region, nondistended  MSK: LLE edema, no visible deformities, full range of motion in all 4 extremities  Neuro: CN II-XII grossly intact, No focal neurologic deficits  Skin: No rash   Psych: normal affect

## 2017-08-18 DIAGNOSIS — R59.1 GENERALIZED ENLARGED LYMPH NODES: ICD-10-CM

## 2017-08-18 DIAGNOSIS — R60.0 LOCALIZED EDEMA: ICD-10-CM

## 2017-08-18 DIAGNOSIS — E10.51 TYPE 1 DIABETES MELLITUS WITH DIABETIC PERIPHERAL ANGIOPATHY WITHOUT GANGRENE: ICD-10-CM

## 2017-08-18 DIAGNOSIS — Z46.81 ENCOUNTER FOR FITTING AND ADJUSTMENT OF INSULIN PUMP: ICD-10-CM

## 2017-08-18 DIAGNOSIS — I10 ESSENTIAL (PRIMARY) HYPERTENSION: ICD-10-CM

## 2017-08-18 DIAGNOSIS — E78.5 HYPERLIPIDEMIA, UNSPECIFIED: ICD-10-CM

## 2017-08-18 DIAGNOSIS — I25.10 ATHEROSCLEROTIC HEART DISEASE OF NATIVE CORONARY ARTERY WITHOUT ANGINA PECTORIS: ICD-10-CM

## 2017-08-18 DIAGNOSIS — N18.6 END STAGE RENAL DISEASE: ICD-10-CM

## 2017-08-18 DIAGNOSIS — D75.89 OTHER SPECIFIED DISEASES OF BLOOD AND BLOOD-FORMING ORGANS: ICD-10-CM

## 2017-08-18 DIAGNOSIS — R10.9 UNSPECIFIED ABDOMINAL PAIN: ICD-10-CM

## 2017-08-18 DIAGNOSIS — I50.43 ACUTE ON CHRONIC COMBINED SYSTOLIC (CONGESTIVE) AND DIASTOLIC (CONGESTIVE) HEART FAILURE: ICD-10-CM

## 2017-08-18 LAB
ANION GAP SERPL CALC-SCNC: 20 MMOL/L — HIGH (ref 5–17)
BASOPHILS # BLD AUTO: 0.03 K/UL — SIGNIFICANT CHANGE UP (ref 0–0.2)
BASOPHILS NFR BLD AUTO: 0.7 % — SIGNIFICANT CHANGE UP (ref 0–2)
BUN SERPL-MCNC: 31 MG/DL — HIGH (ref 7–23)
CALCIUM SERPL-MCNC: 7.7 MG/DL — LOW (ref 8.4–10.5)
CHLORIDE SERPL-SCNC: 89 MMOL/L — LOW (ref 96–108)
CK MB CFR SERPL CALC: 3.4 NG/ML — SIGNIFICANT CHANGE UP (ref 0–3.8)
CK SERPL-CCNC: 266 U/L — HIGH (ref 25–170)
CO2 SERPL-SCNC: 25 MMOL/L — SIGNIFICANT CHANGE UP (ref 22–31)
CREAT SERPL-MCNC: 4.46 MG/DL — HIGH (ref 0.5–1.3)
EOSINOPHIL # BLD AUTO: 0.06 K/UL — SIGNIFICANT CHANGE UP (ref 0–0.5)
EOSINOPHIL NFR BLD AUTO: 1.4 % — SIGNIFICANT CHANGE UP (ref 0–6)
FOLATE SERPL-MCNC: 7.6 NG/ML — SIGNIFICANT CHANGE UP (ref 4.8–24.2)
GLUCOSE SERPL-MCNC: 345 MG/DL — HIGH (ref 70–99)
HCT VFR BLD CALC: 24.8 % — LOW (ref 34.5–45)
HGB BLD-MCNC: 7.9 G/DL — LOW (ref 11.5–15.5)
IMM GRANULOCYTES NFR BLD AUTO: 0.7 % — SIGNIFICANT CHANGE UP (ref 0–1.5)
LYMPHOCYTES # BLD AUTO: 0.81 K/UL — LOW (ref 1–3.3)
LYMPHOCYTES # BLD AUTO: 18.3 % — SIGNIFICANT CHANGE UP (ref 13–44)
MAGNESIUM SERPL-MCNC: 2.1 MG/DL — SIGNIFICANT CHANGE UP (ref 1.6–2.6)
MCHC RBC-ENTMCNC: 31.6 PG — SIGNIFICANT CHANGE UP (ref 27–34)
MCHC RBC-ENTMCNC: 31.9 GM/DL — LOW (ref 32–36)
MCV RBC AUTO: 99.2 FL — SIGNIFICANT CHANGE UP (ref 80–100)
MONOCYTES # BLD AUTO: 0.38 K/UL — SIGNIFICANT CHANGE UP (ref 0–0.9)
MONOCYTES NFR BLD AUTO: 8.6 % — SIGNIFICANT CHANGE UP (ref 2–14)
NEUTROPHILS # BLD AUTO: 3.11 K/UL — SIGNIFICANT CHANGE UP (ref 1.8–7.4)
NEUTROPHILS NFR BLD AUTO: 70.3 % — SIGNIFICANT CHANGE UP (ref 43–77)
PHOSPHATE SERPL-MCNC: 3.9 MG/DL — SIGNIFICANT CHANGE UP (ref 2.5–4.5)
PLATELET # BLD AUTO: 220 K/UL — SIGNIFICANT CHANGE UP (ref 150–400)
POTASSIUM SERPL-MCNC: 4.5 MMOL/L — SIGNIFICANT CHANGE UP (ref 3.5–5.3)
POTASSIUM SERPL-SCNC: 4.5 MMOL/L — SIGNIFICANT CHANGE UP (ref 3.5–5.3)
RBC # BLD: 2.5 M/UL — LOW (ref 3.8–5.2)
RBC # FLD: 14.4 % — SIGNIFICANT CHANGE UP (ref 10.3–14.5)
SODIUM SERPL-SCNC: 134 MMOL/L — LOW (ref 135–145)
VIT B12 SERPL-MCNC: 634 PG/ML — SIGNIFICANT CHANGE UP (ref 243–894)
WBC # BLD: 4.42 K/UL — SIGNIFICANT CHANGE UP (ref 3.8–10.5)
WBC # FLD AUTO: 4.42 K/UL — SIGNIFICANT CHANGE UP (ref 3.8–10.5)

## 2017-08-18 PROCEDURE — 12345: CPT | Mod: NC

## 2017-08-18 PROCEDURE — 99223 1ST HOSP IP/OBS HIGH 75: CPT

## 2017-08-18 RX ORDER — HYDRALAZINE HCL 50 MG
100 TABLET ORAL THREE TIMES A DAY
Qty: 0 | Refills: 0 | Status: DISCONTINUED | OUTPATIENT
Start: 2017-08-18 | End: 2017-08-22

## 2017-08-18 RX ORDER — DEXTROSE 50 % IN WATER 50 %
25 SYRINGE (ML) INTRAVENOUS ONCE
Qty: 0 | Refills: 0 | Status: DISCONTINUED | OUTPATIENT
Start: 2017-08-18 | End: 2017-08-22

## 2017-08-18 RX ORDER — OXYCODONE AND ACETAMINOPHEN 5; 325 MG/1; MG/1
1 TABLET ORAL AT BEDTIME
Qty: 0 | Refills: 0 | Status: DISCONTINUED | OUTPATIENT
Start: 2017-08-18 | End: 2017-08-22

## 2017-08-18 RX ORDER — HEPARIN SODIUM 5000 [USP'U]/ML
5000 INJECTION INTRAVENOUS; SUBCUTANEOUS EVERY 12 HOURS
Qty: 0 | Refills: 0 | Status: DISCONTINUED | OUTPATIENT
Start: 2017-08-18 | End: 2017-08-22

## 2017-08-18 RX ORDER — DULOXETINE HYDROCHLORIDE 30 MG/1
60 CAPSULE, DELAYED RELEASE ORAL DAILY
Qty: 0 | Refills: 0 | Status: DISCONTINUED | OUTPATIENT
Start: 2017-08-18 | End: 2017-08-22

## 2017-08-18 RX ORDER — CLOPIDOGREL BISULFATE 75 MG/1
75 TABLET, FILM COATED ORAL AT BEDTIME
Qty: 0 | Refills: 0 | Status: DISCONTINUED | OUTPATIENT
Start: 2017-08-18 | End: 2017-08-22

## 2017-08-18 RX ORDER — CINACALCET 30 MG/1
60 TABLET, FILM COATED ORAL DAILY
Qty: 0 | Refills: 0 | Status: DISCONTINUED | OUTPATIENT
Start: 2017-08-18 | End: 2017-08-22

## 2017-08-18 RX ORDER — OXYCODONE HYDROCHLORIDE 5 MG/1
5 TABLET ORAL ONCE
Qty: 0 | Refills: 0 | Status: DISCONTINUED | OUTPATIENT
Start: 2017-08-18 | End: 2017-08-18

## 2017-08-18 RX ORDER — ASPIRIN/CALCIUM CARB/MAGNESIUM 324 MG
81 TABLET ORAL DAILY
Qty: 0 | Refills: 0 | Status: DISCONTINUED | OUTPATIENT
Start: 2017-08-18 | End: 2017-08-22

## 2017-08-18 RX ORDER — SODIUM CHLORIDE 9 MG/ML
1000 INJECTION, SOLUTION INTRAVENOUS
Qty: 0 | Refills: 0 | Status: DISCONTINUED | OUTPATIENT
Start: 2017-08-18 | End: 2017-08-22

## 2017-08-18 RX ORDER — LISINOPRIL 2.5 MG/1
40 TABLET ORAL AT BEDTIME
Qty: 0 | Refills: 0 | Status: DISCONTINUED | OUTPATIENT
Start: 2017-08-18 | End: 2017-08-22

## 2017-08-18 RX ORDER — INSULIN ASPART 100 [IU]/ML
1 INJECTION, SOLUTION SUBCUTANEOUS
Qty: 0 | Refills: 0 | Status: DISCONTINUED | OUTPATIENT
Start: 2017-08-18 | End: 2017-08-19

## 2017-08-18 RX ORDER — ATORVASTATIN CALCIUM 80 MG/1
20 TABLET, FILM COATED ORAL AT BEDTIME
Qty: 0 | Refills: 0 | Status: DISCONTINUED | OUTPATIENT
Start: 2017-08-18 | End: 2017-08-22

## 2017-08-18 RX ORDER — DEXTROSE 50 % IN WATER 50 %
12.5 SYRINGE (ML) INTRAVENOUS ONCE
Qty: 0 | Refills: 0 | Status: DISCONTINUED | OUTPATIENT
Start: 2017-08-18 | End: 2017-08-22

## 2017-08-18 RX ORDER — ACETAMINOPHEN 500 MG
650 TABLET ORAL ONCE
Qty: 0 | Refills: 0 | Status: COMPLETED | OUTPATIENT
Start: 2017-08-18 | End: 2017-08-18

## 2017-08-18 RX ORDER — GLUCAGON INJECTION, SOLUTION 0.5 MG/.1ML
1 INJECTION, SOLUTION SUBCUTANEOUS ONCE
Qty: 0 | Refills: 0 | Status: DISCONTINUED | OUTPATIENT
Start: 2017-08-18 | End: 2017-08-22

## 2017-08-18 RX ORDER — DEXTROSE 50 % IN WATER 50 %
1 SYRINGE (ML) INTRAVENOUS ONCE
Qty: 0 | Refills: 0 | Status: DISCONTINUED | OUTPATIENT
Start: 2017-08-18 | End: 2017-08-22

## 2017-08-18 RX ORDER — SEVELAMER CARBONATE 2400 MG/1
2400 POWDER, FOR SUSPENSION ORAL EVERY 8 HOURS
Qty: 0 | Refills: 0 | Status: DISCONTINUED | OUTPATIENT
Start: 2017-08-18 | End: 2017-08-22

## 2017-08-18 RX ORDER — METOPROLOL TARTRATE 50 MG
100 TABLET ORAL AT BEDTIME
Qty: 0 | Refills: 0 | Status: DISCONTINUED | OUTPATIENT
Start: 2017-08-18 | End: 2017-08-22

## 2017-08-18 RX ADMIN — SEVELAMER CARBONATE 2400 MILLIGRAM(S): 2400 POWDER, FOR SUSPENSION ORAL at 06:11

## 2017-08-18 RX ADMIN — OXYCODONE AND ACETAMINOPHEN 1 TABLET(S): 5; 325 TABLET ORAL at 21:16

## 2017-08-18 RX ADMIN — Medication 81 MILLIGRAM(S): at 11:48

## 2017-08-18 RX ADMIN — CLOPIDOGREL BISULFATE 75 MILLIGRAM(S): 75 TABLET, FILM COATED ORAL at 22:33

## 2017-08-18 RX ADMIN — OXYCODONE HYDROCHLORIDE 5 MILLIGRAM(S): 5 TABLET ORAL at 14:41

## 2017-08-18 RX ADMIN — OXYCODONE HYDROCHLORIDE 5 MILLIGRAM(S): 5 TABLET ORAL at 15:21

## 2017-08-18 RX ADMIN — Medication 100 MILLIGRAM(S): at 21:41

## 2017-08-18 RX ADMIN — Medication 1 TABLET(S): at 11:48

## 2017-08-18 RX ADMIN — SEVELAMER CARBONATE 2400 MILLIGRAM(S): 2400 POWDER, FOR SUSPENSION ORAL at 14:42

## 2017-08-18 RX ADMIN — Medication 100 MILLIGRAM(S): at 01:16

## 2017-08-18 RX ADMIN — CINACALCET 60 MILLIGRAM(S): 30 TABLET, FILM COATED ORAL at 11:48

## 2017-08-18 RX ADMIN — OXYCODONE AND ACETAMINOPHEN 1 TABLET(S): 5; 325 TABLET ORAL at 01:15

## 2017-08-18 RX ADMIN — Medication 650 MILLIGRAM(S): at 10:58

## 2017-08-18 RX ADMIN — ATORVASTATIN CALCIUM 20 MILLIGRAM(S): 80 TABLET, FILM COATED ORAL at 22:33

## 2017-08-18 RX ADMIN — Medication 100 MILLIGRAM(S): at 06:11

## 2017-08-18 RX ADMIN — DULOXETINE HYDROCHLORIDE 60 MILLIGRAM(S): 30 CAPSULE, DELAYED RELEASE ORAL at 11:48

## 2017-08-18 RX ADMIN — Medication 650 MILLIGRAM(S): at 11:28

## 2017-08-18 RX ADMIN — LISINOPRIL 40 MILLIGRAM(S): 2.5 TABLET ORAL at 22:33

## 2017-08-18 RX ADMIN — Medication 100 MILLIGRAM(S): at 21:42

## 2017-08-18 RX ADMIN — LISINOPRIL 40 MILLIGRAM(S): 2.5 TABLET ORAL at 01:16

## 2017-08-18 RX ADMIN — OXYCODONE AND ACETAMINOPHEN 1 TABLET(S): 5; 325 TABLET ORAL at 01:45

## 2017-08-18 RX ADMIN — Medication 1 INCH(S): at 09:46

## 2017-08-18 RX ADMIN — OXYCODONE AND ACETAMINOPHEN 1 TABLET(S): 5; 325 TABLET ORAL at 22:00

## 2017-08-18 RX ADMIN — SEVELAMER CARBONATE 2400 MILLIGRAM(S): 2400 POWDER, FOR SUSPENSION ORAL at 23:01

## 2017-08-18 NOTE — PROGRESS NOTE ADULT - SUBJECTIVE AND OBJECTIVE BOX
PULMONARY PROGRESS NOTE    NATHAN MEEKS  MRN-[id number]    Patient is a 62y old  Female who presents with a chief complaint of SOB/lightheadedness (18 Aug 2017 00:36)      HPI:  -SOB, feels better on O2, denies cough/sputum/fever.    ROS:   -Hypertensive      ACTIVE MEDICATION LIST:  MEDICATIONS  (STANDING):  oxyCODONE    5 mG/acetaminophen 325 mG 1 Tablet(s) Oral at bedtime  aspirin enteric coated 81 milliGRAM(s) Oral daily  DULoxetine 60 milliGRAM(s) Oral daily  atorvastatin 20 milliGRAM(s) Oral at bedtime  clopidogrel Tablet 75 milliGRAM(s) Oral at bedtime  metoprolol succinate  milliGRAM(s) Oral at bedtime  cinacalcet 60 milliGRAM(s) Oral daily  sevelamer hydrochloride 2400 milliGRAM(s) Oral every 8 hours  hydrALAZINE 100 milliGRAM(s) Oral three times a day  multivitamin 1 Tablet(s) Oral daily  heparin  Injectable 5000 Unit(s) SubCutaneous every 12 hours  lisinopril 40 milliGRAM(s) Oral at bedtime  dextrose 5%. 1000 milliLiter(s) (50 mL/Hr) IV Continuous <Continuous>  dextrose 50% Injectable 12.5 Gram(s) IV Push once  dextrose 50% Injectable 25 Gram(s) IV Push once  dextrose 50% Injectable 25 Gram(s) IV Push once  insulin aspart (NovoLOG) Pump 1 Each SubCutaneous Continuous Pump    MEDICATIONS  (PRN):  dextrose Gel 1 Dose(s) Oral once PRN Blood Glucose LESS THAN 70 milliGRAM(s)/deciliter  glucagon  Injectable 1 milliGRAM(s) IntraMuscular once PRN Glucose LESS THAN 70 milligrams/deciliter      EXAM:  Vital Signs Last 24 Hrs  T(C): 36.8 (18 Aug 2017 06:15), Max: 37.1 (17 Aug 2017 16:37)  T(F): 98.2 (18 Aug 2017 06:15), Max: 98.7 (17 Aug 2017 16:37)  HR: 85 (18 Aug 2017 06:15) (75 - 94)  BP: 157/81 (18 Aug 2017 06:15) (157/81 - 195/74)  BP(mean): --  RR: 18 (18 Aug 2017 06:15) (17 - 19)  SpO2: 97% (18 Aug 2017 06:15) (95% - 100%)    GENERAL: The patient is awake and alert in no apparent distress.     SKIN: Warm, dry, no rashes    LUNGS: bilateral crackles    HEART: S1/S2    ABDOMEN: +BS, Soft, Nontender    EXTREMITIES: +++edema lower extremities    LABS/IMGAING: reviewed                          7.9    4.42  )-----------( 220      ( 18 Aug 2017 07:21 )             24.8     08-18    134<L>  |  89<L>  |  31<H>  ----------------------------<  345<H>  4.5   |  25  |  4.46<H>    Ca    7.7<L>      18 Aug 2017 08:43  Phos  3.9     08-18  Mg     2.1     08-18    TPro  6.9  /  Alb  4.1  /  TBili  0.4  /  DBili  x   /  AST  49<H>  /  ALT  60<H>  /  AlkPhos  246<H>  08-17    < from: CT Abdomen and Pelvis w/ IV Cont (08.17.17 @ 21:22) >  IMPRESSION:     No pulmonary embolus.    Pulmonary edema. Small pleural effusions.    Mediastinal lymphadenopathy.    Mildly enlarged upper abdominal lymph node.    < end of copied text >      PROBLEM LIST:  62y Female with HEALTH ISSUES - PROBLEM Dx:  Anasarca  HTN  Coronary artery disease    ESRD (end stage renal disease)  Diabetes      RECS:  -Dyspnea chronic and usually related to volume overload, no evidence of active infection.  -Fluid removal/diuresis  -HTN management  -check ambulatory sat and assess need for home O2      Alem Tate MD  [phone]

## 2017-08-18 NOTE — H&P ADULT - NSHPPHYSICALEXAM_GEN_ALL_CORE
Vital Signs Last 24 Hrs  T(C): 37.1 (17 Aug 2017 23:40), Max: 37.1 (17 Aug 2017 16:37)  T(F): 98.7 (17 Aug 2017 23:40), Max: 98.7 (17 Aug 2017 16:37)  HR: 88 (17 Aug 2017 23:40) (75 - 94)  BP: 168/79 (17 Aug 2017 23:40) (168/79 - 195/74)  BP(mean): --  RR: 17 (17 Aug 2017 23:40) (17 - 19)  SpO2: 99% (17 Aug 2017 23:40) (95% - 100%)    GENERAL: No acute distress, well-developed  HEAD:  Atraumatic, Normocephalic  EYES: EOMI, PERRLA, conjunctiva and sclera clear  ENT: Oral mucosa moist  NECK: Neck supple  CHEST/LUNG: Clear to auscultation bilaterally; No wheeze, no rales, no rhonchi.    HEART: Regular rate and rhythm; No murmurs, rubs, or gallops  ABDOMEN: Soft, Nontender, Nondistended; Bowel sounds present  EXTREMITIES:  No clubbing, cyanosis.  2+ pitting edema of LLE.    VASCULAR: Posterior tibialis pulses intact bilaterally.  PSYCH: Normal behavior, normal affect  NEUROLOGY: AAOx3  SKIN: grossly warm and dry

## 2017-08-18 NOTE — ADVANCED PRACTICE NURSE CONSULT - REASON FOR CONSULT
Certified Diabetes Educator Certified Diabetes Educator     62Fw/ PMHx T1DM on insulin pump with multiple complications and morbid medical conditions, HTN, HLD, former smoker, gout, MI, CAD s/p PCI to RCA and brachytherapy, dCHF, chronic LLE pain and edema in setting of severe PVD s/p L & R fem-pop bypass  graft,  multiple vascular surgeries both leg w/ fem-pop bypass revisions , Subclavian vein stenosis, left: s/p stent, on ASA and Plavix,  ESRD on HD (Tu/Thr/Sat) via L AVF, CVA with short term memory deficit, depression, chronic pain management for LLE on oxycodone, s/p cholecystectomy, multiple prior admissions to hospital for c/o hyperglycemia,  increasing leg edema, and chest pain, very recent admission here with DKA and NSTEMI s/p stent and discharged 8/3 now presenting with acutely worsened SOB and lightheadedness x 1 day.  Pt consulted for T1DM and insulin pump therapy.

## 2017-08-18 NOTE — CONSULT NOTE ADULT - SUBJECTIVE AND OBJECTIVE BOX
CHIEF COMPLAINT:  SOB severe left leg pain    HISTORY OF PRESENT ILLNESS:  HPI:  Pt seen and examined late evening of 8/17/2017.  Documentation begun after midnight.     62Fw/ PMHx T1DM on insulin pump with multiple complications and morbid medical conditions, HTN, HLD, former smoker, gout, MI, CAD s/p PCI to RCA and brachytherapy, dCHF, chronic LLE pain and edema in setting of severe PVD s/p L & R fem-pop bypass  graft,  multiple vascular surgery both leg w/ fem-pop bypass revisions , Subclavian vein stenosis, left: s/p stent, on ASA and Plavix,  ESRD on HD (Tu/Thr/Sat) via L AVF, CVA with short term memory deficit, depression, chronic pain management for LLE on oxycodone, s/p cholecystectomy, multiple prior admissions to hospital for c/o hyperglycemia,  increasing leg edema, and chest pain, very recent admission here with DKA and NSTEMI s/p stent and discharged 8/3 now presenting with acutely worsened SOB and lightheadedness x 1 day.  Patient reports that she had gone to dialysis as scheduled the AM of 8/17 and while at dialysis suddenly began to feel very short of breath.  Associated with this she also began to feel lightheaded and felt "the room was spinning".  Because of this she reports that they reduced the fluid removal at her dialysis session and she was sent to her PMD.  At PMD's office patient's SBP was reportedly over 200 and she was sent to the [facility] ED.  Patient reports that her lightheadedness resolved after arriving at the hospital and that she feels significant improvement in her SOB after receiving blood pressure medications and being placed briefly on BiPAP.  She denies any chest pain, denies jaw pain or arm numbness.  Patient reports that when people are palpating her abdomen she has abdominal tenderness in the right upper, right lower, and left lower quadrants but otherwise does not have any abdominal pain and does not have any nausea/vomiting/diarrhea/constipation.  Patient also reports that she has noticed increased LLE swelling though this is a common occurrence for the patient for her LLE to have increased edema over the right and she reports having had extensive workup for DVTs in the past.  She has had multiple operations as above and edema and pains were felt related to these.     Patient has chronic diastolic heart failure normal systolic fx S/P recent PTCI of oistal RCA. No chest pain on this admission. Severe left leg pain  PAST MEDICAL & SURGICAL HISTORY:  Subclavian vein stenosis, left: s/p stent  Cellulitis: 2015 left foot  DKA, type 1: 1/2015  ACS (acute coronary syndrome): 1/2015 - cath revealed 100% ostial stenosis not amenable to PCI - medical management  MI, old  TIA (transient ischemic attack): x 2 - 8-9 years ago prior to ASD/VSD repair  CAD (coronary artery disease): s/p stents  Murmur, cardiac  Gout: past  CVA (cerebral infarction): with no residual, 8 yrs ago, prior to heart surgery - ST memory loss  Peripheral vascular disease: occluded left fem-pop bypass 5/2015  Diabetes mellitus type 1: Insulin Dependent - Medtronic  Minimed Paradigm Insulin Pump - Novolog  ESRD (end stage renal disease): dialysis , tue, thursday, saturday  Hyperlipidemia  Status post device closure of ASD: &quot;clamshell&quot;  History of cardiac catheterization: 1/2015 - no intervention  S/P femoral-popliteal bypass surgery: L and R in 2013 with graft; 5/2015 CFA angioplasty left and ileofemoral endarterectomywith vein patch angioplasty of left fem-pop bypass graft  Multiple vascular surgery both leg, left fempop bypass revision 11/2015  AV (arteriovenous fistula): Left AV graft; revision with stent placement 2-3 years ago  S/P cholecystectomy          MEDICATIONS:  aspirin enteric coated 81 milliGRAM(s) Oral daily  clopidogrel Tablet 75 milliGRAM(s) Oral at bedtime  metoprolol succinate  milliGRAM(s) Oral at bedtime  hydrALAZINE 100 milliGRAM(s) Oral three times a day  heparin  Injectable 5000 Unit(s) SubCutaneous every 12 hours  lisinopril 40 milliGRAM(s) Oral at bedtime        oxyCODONE    5 mG/acetaminophen 325 mG 1 Tablet(s) Oral at bedtime  DULoxetine 60 milliGRAM(s) Oral daily      atorvastatin 20 milliGRAM(s) Oral at bedtime  dextrose Gel 1 Dose(s) Oral once PRN  dextrose 50% Injectable 12.5 Gram(s) IV Push once  dextrose 50% Injectable 25 Gram(s) IV Push once  dextrose 50% Injectable 25 Gram(s) IV Push once  glucagon  Injectable 1 milliGRAM(s) IntraMuscular once PRN  insulin aspart (NovoLOG) Pump 1 Each SubCutaneous Continuous Pump    multivitamin 1 Tablet(s) Oral daily  dextrose 5%. 1000 milliLiter(s) IV Continuous <Continuous>      FAMILY HISTORY:  Family history of smoking  Family history of hypertension  Family history of cancer (Sibling)      SOCIAL HISTORY:    [ ] Non-smoker  [ ] Smoker  [ ] Alcohol    Allergies    No Known Allergies    Intolerances    	      PHYSICAL EXAM:  T(C): 36.9 (08-18-17 @ 12:58), Max: 37.1 (08-17-17 @ 16:37)  HR: 71 (08-18-17 @ 12:58) (71 - 94)  BP: 160/71 (08-18-17 @ 12:58) (157/81 - 195/74)  RR: 18 (08-18-17 @ 12:58) (17 - 19)  SpO2: 100% (08-18-17 @ 12:58) (95% - 100%)  Wt(kg): --  I&O's Summary    17 Aug 2017 07:01  -  18 Aug 2017 07:00  --------------------------------------------------------  IN: 150 mL / OUT: 0 mL / NET: 150 mL    18 Aug 2017 07:01  -  18 Aug 2017 15:21  --------------------------------------------------------  IN: 480 mL / OUT: 0 mL / NET: 480 mL        Appearance: Normal crying mild respiratory distress	  HEENT:   Normal oral mucosa, PERRL, EOMI	  Cardiovascular: Normal S1 S2, No JVD, No murmurs  Respiratory: rales at right base	  Psychiatry: A & O x 3, Mood & affect appropriate  Gastrointestinal:  Soft, Non-tender, + BS	  Skin: No rashes, No ecchymoses, No cyanosis	  Neurologic: Non-focal  Extremities: LLE tender sevee 3+ edema RLE 1 + edema      TELEMETRY: 	    ECG:  NSR no acute changes    Echo	  < from: Transthoracic Echocardiogram (07.24.17 @ 09:06) >  Conclusions:  1. Mitral annular calcification, otherwise normal mitral  valve. Mild mitral regurgitation.  2. Calcified trileaflet aortic valve with decreased  opening. Peak transaortic valve gradient equals 23 mm Hg,  mean transaortic valve gradient equals 14 mm Hg, estimated  aortic valve area equals 1.7 sqcm (by continuity equation),  consistent with mild aortic stenosis.  3. Normal left ventricular systolic function.  4. Normal right ventricular size and function.  5. Normal tricuspid valve. Mild-moderate tricuspid  regurgitation.  *** Compared with echocardiogram of 6/16/2017, no  significant changes noted.  ------------------------------------------------------------------------  Confirmed on  7/24/2017 - 10:45:49 by Ronda Bear M.D.  ---------------------------------------------------------------         	  	  LABS:	 	    CARDIAC MARKERS:  Troponin T, Serum: 0.10 ng/mL (08-17 @ 17:55)                                  7.9    4.42  )-----------( 220      ( 18 Aug 2017 07:21 )             24.8     08-18    134<L>  |  89<L>  |  31<H>  ----------------------------<  345<H>  4.5   |  25  |  4.46<H>    Ca    7.7<L>      18 Aug 2017 08:43  Phos  3.9     08-18  Mg     2.1     08-18    TPro  6.9  /  Alb  4.1  /  TBili  0.4  /  DBili  x   /  AST  49<H>  /  ALT  60<H>  /  AlkPhos  246<H>  08-17    proBNP: Serum Pro-Brain Natriuretic Peptide: >52468 pg/mL (08-17 @ 17:55)    Lipid Profile:   HgA1c:   TSH:

## 2017-08-18 NOTE — H&P ADULT - PROBLEM SELECTOR PLAN 8
Unclear cause, no pain when not being palpated. CT A/P without consistent findings.  ? Statin myopathy?  Will check total CK, did have slight elevation last time but was in setting of NSTEMI.  Consider change to other statin agent to see if this may improve soreness to palpation?  Pt is comfortable when not being palpated

## 2017-08-18 NOTE — CONSULT NOTE ADULT - PROBLEM SELECTOR RECOMMENDATION 9
Diabetes Education and Nutrition Eval  c/w insulin pump at current settings  Change site today  Goal glucose 100-180  Outpt. endo follow-up w/ Dr. Ley  Outpt. optho, podiatry, nephrology  Plan to d/c pump

## 2017-08-18 NOTE — H&P ADULT - HISTORY OF PRESENT ILLNESS
Pt seen and examined late evening of 8/17/2017.  Documentation begun after midnight.     62Fw/ PMHx T1DM on insulin pump with multiple complications and morbid medical conditions, HTN, HLD, former smoker, gout, MI, CAD s/p PCI to RCA and brachytherapy, dCHF, chronic LLE pain and edema in setting of severe PVD s/p L & R fem-pop bypass  graft,  multiple vascular surgery both leg w/ fem-pop bypass revisions , Subclavian vein stenosis, left: s/p stent, on ASA and Plavix,  ESRD on HD (Tu/Thr/Sat) via L AVF, CVA with short term memory deficit, depression, chronic pain management for LLE on oxycodone, s/p cholecystectomy, multiple prior admissions to hospital for c/o hyperglycemia,  increasing leg edema, and chest pain, very recent admission here with DKA and NSTEMI s/p stent and discharged 8/3 now presenting with acutely worsened SOB and lightheadedness x 1 day.  Patient reports that she had gone to dialysis as scheduled the AM of 8/17 and while at dialysis suddenly began to feel very short of breath.  Associated with this she also began to feel lightheaded and felt "the room was spinning".  Because of this she reports that they reduced the fluid removal at her dialysis session and she was sent to her PMD.  At PMD's office patient's SBP was reportedly over 200 and she was sent to the [facility] ED.  Patient reports that her lightheadedness resolved after arriving at the hospital and that she feels significant improvement in her SOB after receiving blood pressure medications and being placed briefly on BiPAP.  She denies any chest pain, denies jaw pain or arm numbness.  Patient reports that when people are palpating her abdomen she has abdominal tenderness in the right upper, right lower, and left lower quadrants but otherwise does not have any abdominal pain and does not have any nausea/vomiting/diarrhea/constipation.  Patient also reports that she has noticed increased LLE swelling though this is a common occurrence for the patient for her LLE to have increased edema over the right and she reports having had extensive workup for DVTs in the past.  She has had multiple operations as above and edema and pains were felt related to these.

## 2017-08-18 NOTE — H&P ADULT - PROBLEM SELECTOR PLAN 7
Will recheck B12, folate, and add methylmalonic acid.  May need heme/onc consult given macrocytosis as well as LAD as above

## 2017-08-18 NOTE — CONSULT NOTE ADULT - ASSESSMENT
1. diastolci heart failure fluid overload ESRD  2. ESRD  3. symptoms exascerbated by severe left leg pain of unclear cause  4. CAD s/p multiple PTCI of RCA brachtherapy and recent PTCI of stial RCA no evidence of ischemia  5. PVD  6. DM poorly controlled    Recommend  vascular followup re: re: pain and swelling  dialyze as per renal  no further workup cardiac wise patient does not need another echo  prognosis is guraded

## 2017-08-18 NOTE — ADVANCED PRACTICE NURSE CONSULT - RECOMMEDATIONS
. Pt with C/O severe LLE leg throbbing pain during RN, CDE visit (9/10).  Primary RN made aware and she reports she will be giving pt her prn Oxycodone dose.  Primary RN also instructed on appropriate insulin pump monitoring and documentation with verbalized understanding obtained via the teach-back method.  Primary RN agrees to reinforce that pt change her insulin infusion set as soon as her  arrives with her insulin pump supplies.  DSME provided documented in Adult Plan of Care flow sheet.

## 2017-08-18 NOTE — ADVANCED PRACTICE NURSE CONSULT - ASSESSMENT
. Pt does not have insulin supplies with her but reports her  is bringing them today.  Pt last changed her insulin pump site on 8/14/17 and was due to change her site yesterday.  Pt reports she forgot due to stress R/T LLE pain and SOB.  Pt instructed that she needs to change her site TODAY with verbalized understanding and agreement obtained via the teach-back method.   Insulin pump site on right abdomen and remains clean, dry and intact. Insulin pump still has enough insulin to hold pt over until  gets here (almost 50 units).   ALL insulin pump forms in chart completed and signed.  Pt’s current insulin pump settings as follows;         Basal  12am – 0.25 units/hour  5am – 0.35 units/hour    ICR  12am-12am – 1:15    ISF  12am – 60  7am – 40  5pm – 60    Blood glucose target  12am – 110-130 mg/dl  8am – 100-120 mg/dl     Active insulin time: 6 hours Pt does not have insulin supplies with her but reports her  is bringing them today.  Pt last changed her insulin pump site on 8/14/17 and was due to change her site yesterday.  Pt reports she forgot due to stress R/T LLE pain and SOB.  Pt instructed that she needs to change her site TODAY with verbalized understanding and agreement obtained via the teach-back method.   Insulin pump site on right abdomen and remains clean, dry and intact.   Pt's FS have been running between 340-440 mg/dl since 12am thing morning and pt administering correction boluses for highs with last FS in the 340's.  The importance of changing her insulin pump site TODAY emphasized extensively with pt.    Insulin pump still has enough insulin to hold pt over until  gets here (almost 50 units).   ALL insulin pump forms in chart completed and signed.  Pt’s current insulin pump settings as follows;         Basal  12am – 0.25 units/hour  5am – 0.35 units/hour    ICR  12am-12am – 1:15    ISF  12am – 60  7am – 40  5pm – 60    Blood glucose target  12am – 110-130 mg/dl  8am – 100-120 mg/dl     Active insulin time: 6 hours

## 2017-08-18 NOTE — H&P ADULT - NSHPLABSRESULTS_GEN_ALL_CORE
Labs personally reviewed:                        9.1    6.5   )-----------( 209      ( 17 Aug 2017 17:55 )             27.7       08-17    138  |  92<L>  |  22  ----------------------------<  160<H>  4.3   |  28  |  3.92<H>    Ca    7.9<L>      17 Aug 2017 17:55  Phos  2.9     08-17  Mg     2.0     08-17    TPro  6.9  /  Alb  4.1  /  TBili  0.4  /  DBili  x   /  AST  49<H>  /  ALT  60<H>  /  AlkPhos  246<H>  08-17      CARDIAC MARKERS ( 17 Aug 2017 17:55 )  x     / 0.10 ng/mL / x     / x     / x            LIVER FUNCTIONS - ( 17 Aug 2017 17:55 )  Alb: 4.1 g/dL / Pro: 6.9 g/dL / ALK PHOS: 246 U/L / ALT: 60 U/L RC / AST: 49 U/L / GGT: x             PT/INR - ( 17 Aug 2017 17:55 )   PT: 12.4 sec;   INR: 1.14 ratio         PTT - ( 17 Aug 2017 17:55 )  PTT:28.5 sec      CXR personally reviewed, likely pulmonary edema  CTA with pulmonary edema, some LAD.      EKG personally reviewed-NSR with prolonged QTC 524ms

## 2017-08-18 NOTE — CONSULT NOTE ADULT - PROBLEM SELECTOR RECOMMENDATION 3
Still uncontrolled, but improving. Goal glucose <140/90. c/w hydralazine, lisinopril, and metoprolol.

## 2017-08-18 NOTE — CONSULT NOTE ADULT - SUBJECTIVE AND OBJECTIVE BOX
HPI:  61 y/o F w/ hx of DM Type 1 x 43 years. Complicated by nephropathy with ESRD on HD, neuropathy, retinopathy, macrovascular comlications. A1c 6.8%. On medtronic insulin pump for the past 3 years with settings as listed below. Changes sites every 3 days. Last changed 3 days ago. Does not have supplies with her, but her  will be bringing in supplies shortly. Uses bolus wizard. Checks glucose 5 x/day with Junior Contour glucometer. Values mostly 180's-190's. No recent hypoglycemia. Reported some nausea, vomiting, and shortness of breath 2 days ago which has now resolved. Complains of LE pain with swelling. Denies polyuria, polydipsia, heat intolerance. Tries to monitor carbs.    Pump Settings:  Basal:  12am 0.25  5am 0.35    I:C 15    Sensitivity:  12AM 60  7AM 40  6PM 60    Target   12am 110-130  8am 100-120    Also with hx of HTN, HLD, former smoker, gout, MI, CAD s/p PCI to RCA and brachytherapy, dCHF, chronic LLE pain and edema in setting of severe PVD s/p L & R fem-pop bypass  graft,  multiple vascular surgery both leg w/ fem-pop bypass revisions , Subclavian vein stenosis, left: s/p stent, on ASA and Plavix,  ESRD on HD (Tu/Thr/Sat) via L AVF, CVA with short term memory deficit, depression, chronic pain management for LLE on oxycodone, s/p cholecystectomy, multiple prior admissions to hospital for c/o hyperglycemia,  increasing leg edema, and chest pain, very recent admission here with DKA and NSTEMI s/p stent and discharged 8/3 now presenting with acutely worsened SOB and lightheadedness x 1 day.  Patient reports that she had gone to dialysis as scheduled the AM of 8/17 and while at dialysis suddenly began to feel very short of breath.  Associated with this she also began to feel lightheaded and felt "the room was spinning".  Because of this she reports that they reduced the fluid removal at her dialysis session and she was sent to her PMD.  At PMD's office patient's SBP was reportedly over 200 and she was sent to the [facility] ED.  Patient reports that her lightheadedness resolved after arriving at the hospital and that she feels significant improvement in her SOB after receiving blood pressure medications and being placed briefly on BiPAP.  She denies any chest pain, denies jaw pain or arm numbness.  Patient reports that when people are palpating her abdomen she has abdominal tenderness in the right upper, right lower, and left lower quadrants but otherwise does not have any abdominal pain and does not have any nausea/vomiting/diarrhea/constipation.  Patient also reports that she has noticed increased LLE swelling though this is a common occurrence for the patient for her LLE to have increased edema over the right and she reports having had extensive workup for DVTs in the past.  She has had multiple operations as above and edema and pains were felt related to these.       PAST MEDICAL & SURGICAL HISTORY:  Subclavian vein stenosis, left: s/p stent  Cellulitis: 2015 left foot  DKA, type 1: 1/2015  ACS (acute coronary syndrome): 1/2015 - cath revealed 100% ostial stenosis not amenable to PCI - medical management  MI, old  TIA (transient ischemic attack): x 2 - 8-9 years ago prior to ASD/VSD repair  CAD (coronary artery disease): s/p stents  Murmur, cardiac  Gout: past  CVA (cerebral infarction): with no residual, 8 yrs ago, prior to heart surgery - ST memory loss  Peripheral vascular disease: occluded left fem-pop bypass 5/2015  Diabetes mellitus type 1: Insulin Dependent - Medtronic  Minimed Paradigm Insulin Pump - Novolog  ESRD (end stage renal disease): dialysis , tue, thursday, saturday  Hyperlipidemia  Status post device closure of ASD: &quot;clamshell&quot;  History of cardiac catheterization: 1/2015 - no intervention  S/P femoral-popliteal bypass surgery: L and R in 2013 with graft; 5/2015 CFA angioplasty left and ileofemoral endarterectomywith vein patch angioplasty of left fem-pop bypass graft  Multiple vascular surgery both leg, left fempop bypass revision 11/2015  AV (arteriovenous fistula): Left AV graft; revision with stent placement 2-3 years ago  S/P cholecystectomy      FAMILY HISTORY:  Family history of smoking  Family history of hypertension  Family history of cancer (Sibling)      Social History: Tobacco: [id number] yrs, quit 17 years ago  Lives with  in [place]    Outpatient Medications:  · 	metoprolol succinate 100 mg oral tablet, extended release: 1 tab(s) orally once a day  · 	hydrALAZINE 100 mg oral tablet: 1 tab(s) orally every 8 hours  · 	clopidogrel 75 mg oral tablet: 1 tab(s) orally once a day (at bedtime)  · 	DULoxetine 60 mg oral delayed release capsule: 1 cap(s) orally once a day  · 	atorvastatin 20 mg oral tablet: 1 tab(s) orally once a day (at bedtime)  · 	aspirin 81 mg oral delayed release tablet: 1 tab(s) orally once a day  · 	Multiple Vitamins oral tablet: 1 tab(s) orally once a day  · 	Percocet 5/325 oral tablet: 1 tab(s) orally once a day (at bedtime), Last Dose Taken:    · 	lisinopril 40 mg oral tablet: 1 tab(s) orally once a day (at bedtime), Last Dose Taken:    · 	Renvela 800 mg oral tablet: 3 tab(s) orally 3 times a day  · 	cinacalcet 60 mg oral tablet: 1 tab(s) orally once a day  · 	HumaLOG 100 units/mL subcutaneous solution: Humalog Pump  	BASAL:  	00:00 - 0.25 units/Hr  	0500 -0.35 units/hr  	  	Insulin to carb ratio  	00:00 1U: 15gram  	  	Insulin Sensitivity factor  	00:00 ISF 60  	07:00 ISF 40  	18:00 ISF 60  	  	Blood glucose target:  	00:00 110-130  	08:00 100-120    MEDICATIONS  (STANDING):  oxyCODONE    5 mG/acetaminophen 325 mG 1 Tablet(s) Oral at bedtime  aspirin enteric coated 81 milliGRAM(s) Oral daily  DULoxetine 60 milliGRAM(s) Oral daily  atorvastatin 20 milliGRAM(s) Oral at bedtime  clopidogrel Tablet 75 milliGRAM(s) Oral at bedtime  metoprolol succinate  milliGRAM(s) Oral at bedtime  cinacalcet 60 milliGRAM(s) Oral daily  sevelamer hydrochloride 2400 milliGRAM(s) Oral every 8 hours  hydrALAZINE 100 milliGRAM(s) Oral three times a day  multivitamin 1 Tablet(s) Oral daily  heparin  Injectable 5000 Unit(s) SubCutaneous every 12 hours  lisinopril 40 milliGRAM(s) Oral at bedtime  dextrose 5%. 1000 milliLiter(s) (50 mL/Hr) IV Continuous <Continuous>  dextrose 50% Injectable 12.5 Gram(s) IV Push once  dextrose 50% Injectable 25 Gram(s) IV Push once  dextrose 50% Injectable 25 Gram(s) IV Push once  insulin aspart (NovoLOG) Pump 1 Each SubCutaneous Continuous Pump    MEDICATIONS  (PRN):  dextrose Gel 1 Dose(s) Oral once PRN Blood Glucose LESS THAN 70 milliGRAM(s)/deciliter  glucagon  Injectable 1 milliGRAM(s) IntraMuscular once PRN Glucose LESS THAN 70 milligrams/deciliter      Allergies    No Known Allergies    Intolerances      Review of Systems:  Constitutional: No fever or chills  Eyes: +retinopathy  Neuro: No headache, +neuropathy  HEENT: No throat pain  Cardiovascular: No chest pain  Respiratory: improved SOB  GI: improved nausea, denies vomiting  : No polyuria  Skin: no rash  Psych: no depression  Endocrine: No polydipsia, No heat or cold intolerance, rest as noted in HPI  Hem/lymph: +swelling of legs    All other review of systems negative      PHYSICAL EXAM:  VITALS: T(C): 36.9 (08-18-17 @ 12:58)  T(F): 98.4 (08-18-17 @ 12:58), Max: 98.7 (08-17-17 @ 16:37)  HR: 71 (08-18-17 @ 12:58) (71 - 94)  BP: 160/71 (08-18-17 @ 12:58) (157/81 - 195/74)  RR:  (17 - 19)  SpO2:  (95% - 100%)  Wt(kg): --  GENERAL: NAD at this time  EYES: No proptosis, EOMI  HEENT:  Atraumatic, Normocephalic,   THYROID: Normal size, no palpable nodules  RESPIRATORY: Clear to auscultation bilaterally, full excursion, non-labored  CARDIOVASCULAR: Regular rhythm; No murmurs; +b/l peripheral edema  GI: Soft, mild tenderness to deep palpation in epigastrium and right upper quadrant without guarding or rebound, non distended, normal bowel sounds  SKIN: Dry, intact, No rashes or lesions  MUSCULOSKELETAL: normal strength  NEURO: follows commands, no tremor,   PSYCH: Alert and oriented x 3, normal affect, normal mood  CUSHING'S SIGNS: no striae    CAPILLARY BLOOD GLUCOSE  140 (08-18 @ 11:35)  181 (08-18 @ 10:46)  348 (08-18 @ 07:11)  430 (08-18 @ 00:25)  440 (08-18 @ 00:23)  372 (08-17 @ 23:22)  148 (08-17 @ 17:59)                            7.9    4.42  )-----------( 220      ( 18 Aug 2017 07:21 )             24.8       08-18    134<L>  |  89<L>  |  31<H>  ----------------------------<  345<H>  4.5   |  25  |  4.46<H>    EGFR if : 12<L>  EGFR if non : 10<L>    Ca    7.7<L>      08-18  Mg     2.1     08-18  Phos  3.9     08-18    TPro  6.9  /  Alb  4.1  /  TBili  0.4  /  DBili  x   /  AST  49<H>  /  ALT  60<H>  /  AlkPhos  246<H>  08-17    Thyroid Function Tests:      Hemoglobin A1C, Whole Blood: 6.8 % <H> [4.0 - 5.6] (07-24-17 @ 06:15)  Hemoglobin A1C, Whole Blood: 6.8 % <H> [4.0 - 5.6] (07-23-17 @ 22:45)  Hemoglobin A1C, Whole Blood: 7.3 % <H> [4.0 - 5.6] (06-16-17 @ 04:45)        Radiology:

## 2017-08-18 NOTE — H&P ADULT - PROBLEM SELECTOR PLAN 6
Unclear cause, will need further workup given multiple enlarged lymph nodes.  Unclear cause with broad differential including neoplastic etiologies, infectious, autoimmune. Given concurrent macrocytosis and anemia consider heme consult?

## 2017-08-18 NOTE — CONSULT NOTE ADULT - SUBJECTIVE AND OBJECTIVE BOX
[facility]  [phone]  NEPHROLOGY      INITIAL CONSULT NOTE  --------------------------------------------------------------------------------  HPI:    Multiple hospital admissions for this patient.  Yesterday she was at dialysis developed left leg pain followed by shortness of breath Intra-dialysisHim.  He came to the emergency room.  Had a CT chest with contrast.  No pulmonary embolism.  Does complain of edema. Complains of edema Lower extremity.  CAT scan also revealed pulmonary edema.    PAST HISTORY  --------------------------------------------------------------------------------  PAST MEDICAL & SURGICAL HISTORY:  Subclavian vein stenosis, left: s/p stent  Cellulitis: 2015 left foot  DKA, type 1: 1/2015  ACS (acute coronary syndrome): 1/2015 - cath revealed 100% ostial stenosis not amenable to PCI - medical management  MI, old  TIA (transient ischemic attack): x 2 - 8-9 years ago prior to ASD/VSD repair  CAD (coronary artery disease): s/p stents  Murmur, cardiac  Gout: past  CVA (cerebral infarction): with no residual, 8 yrs ago, prior to heart surgery - ST memory loss  Peripheral vascular disease: occluded left fem-pop bypass 5/2015  Diabetes mellitus type 1: Insulin Dependent - Medtronic  Minimed Paradigm Insulin Pump - Novolog  ESRD (end stage renal disease): dialysis , tue, thursday, saturday  Hyperlipidemia  Status post device closure of ASD: &quot;yvettehell&quot;  History of cardiac catheterization: 1/2015 - no intervention  S/P femoral-popliteal bypass surgery: L and R in 2013 with graft; 5/2015 CFA angioplasty left and ileofemoral endarterectomywith vein patch angioplasty of left fem-pop bypass graft  Multiple vascular surgery both leg, left fempop bypass revision 11/2015  AV (arteriovenous fistula): Left AV graft; revision with stent placement 2-3 years ago  S/P cholecystectomy    FAMILY HISTORY:  Family history of smoking  Family history of hypertension  Family history of cancer (Sibling)    PAST SOCIAL HISTORY:.  Past tobacco use.    ALLERGIES & MEDICATIONS  --------------------------------------------------------------------------------  Allergies    No Known Allergies    Intolerances      Standing Inpatient Medications  oxyCODONE    5 mG/acetaminophen 325 mG 1 Tablet(s) Oral at bedtime  aspirin enteric coated 81 milliGRAM(s) Oral daily  DULoxetine 60 milliGRAM(s) Oral daily  atorvastatin 20 milliGRAM(s) Oral at bedtime  clopidogrel Tablet 75 milliGRAM(s) Oral at bedtime  metoprolol succinate  milliGRAM(s) Oral at bedtime  cinacalcet 60 milliGRAM(s) Oral daily  sevelamer hydrochloride 2400 milliGRAM(s) Oral every 8 hours  hydrALAZINE 100 milliGRAM(s) Oral three times a day  multivitamin 1 Tablet(s) Oral daily  heparin  Injectable 5000 Unit(s) SubCutaneous every 12 hours  lisinopril 40 milliGRAM(s) Oral at bedtime  dextrose 5%. 1000 milliLiter(s) IV Continuous <Continuous>  dextrose 50% Injectable 12.5 Gram(s) IV Push once  dextrose 50% Injectable 25 Gram(s) IV Push once  dextrose 50% Injectable 25 Gram(s) IV Push once  insulin aspart (NovoLOG) Pump 1 Each SubCutaneous Continuous Pump    PRN Inpatient Medications  dextrose Gel 1 Dose(s) Oral once PRN  glucagon  Injectable 1 milliGRAM(s) IntraMuscular once PRN      REVIEW OF SYSTEMS  --------------------------------------------------------------------------------  Gen: Increase weight changes, Positive fatigue,no  fevers/chills,  +weakness  Skin: No rashes  Head/Eyes/Ears/Mouth: No headache; Normal hearing;Decrease vision No sinus pain/discomfort, sore throat  Respiratory: No dyspnea, + cough, - wheezing, - hemoptysis  CV: No chest pain, PND, orthopnea  GI: No abdominal pain, diarrhea, constipation, nausea, vomiting, MSK: No joint pain/swelling; no back pain; no edema  Neuro: No dizziness/lightheadedness,seizures,Heme: No easy bruising or bleeding  Endo: + heat/cold intolerance  All other systems were reviewed and are negative, except as noted.    VITALS/PHYSICAL EXAM  --------------------------------------------------------------------------------  T(C): 36.8 (08-18-17 @ 06:15), Max: 37.1 (08-17-17 @ 16:37)  HR: 85 (08-18-17 @ 06:15) (75 - 94)  BP: 157/81 (08-18-17 @ 06:15) (157/81 - 195/74)  RR: 18 (08-18-17 @ 06:15) (17 - 19)  SpO2: 97% (08-18-17 @ 06:15) (95% - 100%)  Wt(kg): --  Height (cm): 162.56 (08-17-17 @ 23:40)  Weight (kg): 61.2 (08-17-17 @ 23:40)  BMI (kg/m2): 23.2 (08-17-17 @ 23:40)  BSA (m2): 1.65 (08-17-17 @ 23:40)      08-17-17 @ 07:01  -  08-18-17 @ 07:00  --------------------------------------------------------  IN: 150 mL / OUT: 0 mL / NET: 150 mL    08-18-17 @ 07:01  -  08-18-17 @ 12:55  --------------------------------------------------------  IN: 480 mL / OUT: 0 mL / NET: 480 mL      Physical Exam:  	Gen: Appears mildly tachypneic  	No JVD supple neck,  	Pulm: Decreased breath sounds bilaterally.  Mild Crackles  No wheezing  	CV: RRR, S1S2; no rub  	Back: No spinal or CVA tenderness; no sacral edema  	Abd: +BS, soft, nontender/nondistended  	: No suprapubic tenderness  	LE:Bilateral lower extremity 2+ edema.  No clubbing/ cyanosis noted  	Neuro:Alert and oriented x3.  Speech is coherent  	Psych: Mildly anxious as usual	Skin:No diffuse rash  	Vascular access:Left AV fistula positive bruit.  PositiveThrill    LABS/STUDIES  --------------------------------------------------------------------------------              7.9    4.42  >-----------<  220      [08-18-17 @ 07:21]              24.8     134  |  89  |  31  ----------------------------<  345      [08-18-17 @ 08:43]  4.5   |  25  |  4.46        Ca     7.7     [08-18-17 @ 08:43]      Mg     2.1     [08-18-17 @ 08:43]      Phos  3.9     [08-18-17 @ 08:43]    TPro  6.9  /  Alb  4.1  /  TBili  0.4  /  DBili  x   /  AST  49  /  ALT  60  /  AlkPhos  246  [08-17-17 @ 17:55]    PT/INR: PT 12.4 , INR 1.14       [08-17-17 @ 17:55]  PTT: 28.5       [08-17-17 @ 17:55]    Troponin 0.10      [08-17-17 @ 17:55]        [08-18-17 @ 08:43]    Creatinine Trend:  SCr 4.46 [08-18 @ 08:43]  SCr 3.92 [08-17 @ 17:55]  SCr 7.08 [08-03 @ 07:20]  SCr 5.02 [08-02 @ 07:01]  SCr 8.01 [08-01 @ 07:07]    Urinalysis - [06-04-17 @ 08:24]      Color Yellow / Appearance Clear / SG 1.013 / pH 8.5      Gluc 1000 / Ketone Negative  / Bili Negative / Urobili Negative       Blood Negative / Protein >600 / Leuk Est Small / Nitrite Negative      RBC 3-5 / WBC 6-10 / Hyaline  / Gran  / Sq Epi  / Non Sq Epi OCC / Bacteria       HbA1c 6.8      [07-24-17 @ 06:15]    HBsAb <3.0      [06-20-17 @ 16:10]  HBsAb Nonreact      [05-02-15 @ 15:35]  HBsAg Nonreact      [05-10-17 @ 20:00]  HBcAb Nonreact      [06-20-17 @ 16:10]  HCV 0.18, Nonreact      [05-10-17 @ 20:00]

## 2017-08-18 NOTE — H&P ADULT - PROBLEM SELECTOR PLAN 1
Pt here with acute on chronic likely combined CHF-has hx of diastolic CHF but during recent echo s/p MI with EF 34% so may have combined failure at this time.  Will need fluid removal more aggressively with repeat HD tomorrow especially as s/p CTA.  Will need renal consult in AM for HD (Sees Dr. Daisy Burger when inpatient).  Appears to have responded well to blood pressure control and with use of BiPAP with improvement in respiratory status so will continue to control blood pressures.  Will not use diuretics for now given poor urine production and recent contrast load.

## 2017-08-18 NOTE — H&P ADULT - PROBLEM SELECTOR PLAN 9
LLE swelling and pain, although has had dvt studies in past will repeat as patient with hx of bypasses and may be prone to dvt

## 2017-08-18 NOTE — PROGRESS NOTE ADULT - SUBJECTIVE AND OBJECTIVE BOX
Patient is a 62y old  Female who presents with a chief complaint of SOB/lightheadedness (18 Aug 2017 00:36)      SUBJECTIVE / OVERNIGHT EVENTS: standing, restless, appears dyspneic    MEDICATIONS  (STANDING):  oxyCODONE    5 mG/acetaminophen 325 mG 1 Tablet(s) Oral at bedtime  aspirin enteric coated 81 milliGRAM(s) Oral daily  DULoxetine 60 milliGRAM(s) Oral daily  atorvastatin 20 milliGRAM(s) Oral at bedtime  clopidogrel Tablet 75 milliGRAM(s) Oral at bedtime  metoprolol succinate  milliGRAM(s) Oral at bedtime  cinacalcet 60 milliGRAM(s) Oral daily  sevelamer hydrochloride 2400 milliGRAM(s) Oral every 8 hours  hydrALAZINE 100 milliGRAM(s) Oral three times a day  multivitamin 1 Tablet(s) Oral daily  heparin  Injectable 5000 Unit(s) SubCutaneous every 12 hours  lisinopril 40 milliGRAM(s) Oral at bedtime  dextrose 5%. 1000 milliLiter(s) (50 mL/Hr) IV Continuous <Continuous>  dextrose 50% Injectable 12.5 Gram(s) IV Push once  dextrose 50% Injectable 25 Gram(s) IV Push once  dextrose 50% Injectable 25 Gram(s) IV Push once  insulin aspart (NovoLOG) Pump 1 Each SubCutaneous Continuous Pump    MEDICATIONS  (PRN):  dextrose Gel 1 Dose(s) Oral once PRN Blood Glucose LESS THAN 70 milliGRAM(s)/deciliter  glucagon  Injectable 1 milliGRAM(s) IntraMuscular once PRN Glucose LESS THAN 70 milligrams/deciliter      Vital Signs Last 24 Hrs  T(F): 98.4 (08-18-17 @ 12:58), Max: 98.7 (08-17-17 @ 16:37)  HR: 71 (08-18-17 @ 12:58) (71 - 94)  BP: 160/71 (08-18-17 @ 12:58) (157/81 - 195/74)  RR: 18 (08-18-17 @ 12:58) (17 - 19)  SpO2: 100% (08-18-17 @ 12:58) (95% - 100%)  Telemetry:   CAPILLARY BLOOD GLUCOSE  140 (18 Aug 2017 11:35)  181 (18 Aug 2017 10:46)  348 (18 Aug 2017 07:11)  430 (18 Aug 2017 00:25)  440 (18 Aug 2017 00:23)  372 (17 Aug 2017 23:22)  148 (17 Aug 2017 17:59)        I&O's Summary    17 Aug 2017 07:01  -  18 Aug 2017 07:00  --------------------------------------------------------  IN: 150 mL / OUT: 0 mL / NET: 150 mL    18 Aug 2017 07:01  -  18 Aug 2017 13:50  --------------------------------------------------------  IN: 480 mL / OUT: 0 mL / NET: 480 mL        PHYSICAL EXAM:  GENERAL: NAD, well-developed  HEAD:  Atraumatic, Normocephalic  EYES: EOMI, PERRLA, conjunctiva and sclera clear  NECK: Supple, No JVD  CHEST/LUNG: Clear to auscultation bilaterally; No wheeze  HEART: Regular rate and rhythm; No murmurs, rubs, or gallops  ABDOMEN: Soft, Nontender, Nondistended; Bowel sounds present  EXTREMITIES:  2+ Peripheral Pulses, No clubbing, cyanosis, or edema  PSYCH: AAOx3  NEUROLOGY: non-focal  SKIN: No rashes or lesions    LABS:                        7.9    4.42  )-----------( 220      ( 18 Aug 2017 07:21 )             24.8     08-18    134<L>  |  89<L>  |  31<H>  ----------------------------<  345<H>  4.5   |  25  |  4.46<H>    Ca    7.7<L>      18 Aug 2017 08:43  Phos  3.9     08-18  Mg     2.1     08-18    TPro  6.9  /  Alb  4.1  /  TBili  0.4  /  DBili  x   /  AST  49<H>  /  ALT  60<H>  /  AlkPhos  246<H>  08-17    PT/INR - ( 17 Aug 2017 17:55 )   PT: 12.4 sec;   INR: 1.14 ratio         PTT - ( 17 Aug 2017 17:55 )  PTT:28.5 sec  CARDIAC MARKERS ( 18 Aug 2017 08:43 )  x     / x     / 266 U/L / x     / x      CARDIAC MARKERS ( 18 Aug 2017 06:53 )  x     / x     / x     / x     / 3.4 ng/mL  CARDIAC MARKERS ( 17 Aug 2017 17:55 )  x     / 0.10 ng/mL / x     / x     / x              RADIOLOGY & ADDITIONAL TESTS:    Imaging Personally Reviewed:    Consultant(s) Notes Reviewed:      Care Discussed with Consultants/Other Providers:

## 2017-08-18 NOTE — CONSULT NOTE ADULT - ASSESSMENT
62 years old female multiple hospital admissions with coronary artery disease as well as congestive heart failure, peripheral vascular disease, diabetic ketoacidosis and hypertension among others as listed above.  Patient is now with congestive heart failure. .  This is occurring despite her being on dialysis 4 times a week  1- esrd  2- chf  3- htn   4- PAD  5- shpt     O2 supplementation.  Cardiology evaluation.  Continue with lisinopril 40 mg daily.  She is quite fluid overloaded.  Hemodialysis arrangements for fluid removal.  Renvela3 tablets with meals.  Case discussed with Dr. elham Elizalde Consultation for left lower extremity pain which I believe may be related to peripheral arterial disease

## 2017-08-18 NOTE — H&P ADULT - ASSESSMENT
62Fw/ PMHx T1DM on insulin pump with multiple complications and morbid medical conditions, HTN, HLD, former smoker, gout, MI, CAD s/p PCI to RCA and brachytherapy, dCHF, chronic LLE pain and edema in setting of severe PVD s/p L & R fem-pop bypass  graft,  multiple vascular surgery both leg w/ fem-pop bypass revisions , Subclavian vein stenosis, left: s/p stent, on ASA and Plavix,  ESRD on HD (Tu/Thr/Sat) via L AVF, CVA with short term memory deficit, depression, chronic pain management for LLE on oxycodone, s/p cholecystectomy, multiple prior admissions to hospital for c/o hyperglycemia,  increasing leg edema, and chest pain, very recent admission here with DKA and NSTEMI s/p stent and discharged 8/3 now presenting with acute on chronic diastolic and systolic heart failure in setting of accelerated hypertension.

## 2017-08-18 NOTE — PROVIDER CONTACT NOTE (OTHER) - ACTION/TREATMENT ORDERED:
As per attestation paperwork; pt is to self manage insulin pump. Per pump settings, 6.5 units of humalog given.

## 2017-08-18 NOTE — H&P ADULT - PROBLEM SELECTOR PLAN 2
Likely related at least partially to volume overload.  Continue home medications-patient is still on ACE as well not noted on prior discharge paperwork.  s/p hydralazine in ED here and nitroglycerin  Will monitor and maintain SBP ~160 for now and then retarget after 24 hours.  Monitor on telemetry

## 2017-08-18 NOTE — H&P ADULT - NSHPREVIEWOFSYSTEMS_GEN_ALL_CORE
REVIEW OF SYSTEMS:  CONSTITUTIONAL: No weakness, fevers or chills  EYES: No visual changes  ENT: No dysphagia  NECK: No stiffness  RESPIRATORY: No cough, wheezing, hemoptysis; +SOB as in HPI  CARDIOVASCULAR: No chest pain or palpitations; LLE edema as in HPI.  No LIPSCOMB but patient reports that she walks very slowly due to chronic pain issues.  Did not notice orthopnea   GASTROINTESTINAL: SEE HPI. No nausea or vomiting. No diarrhea or constipation. No melena or hematochezia.  GENITOURINARY: No dysuria, frequency or hematuria.  Minimal small amounts of urine production once a day  NEUROLOGICAL: Chronic weakness and pain of LLE associated with edema that slow down patient's walking but this is unchanged  SKIN: No itching, burning, rashes, or lesions   ALLERGIES: No drug reactions

## 2017-08-18 NOTE — CONSULT NOTE ADULT - ASSESSMENT
63 y/o F w/ Type 1 DM with ESRD on HD, neuropathy, retinopathy, macrovascular complications on a medtronic insulin pump with HTN, HLD a/w shortness of breath and uncontrolled HTN (high risk patient with high level decision-making).

## 2017-08-19 LAB
ANION GAP SERPL CALC-SCNC: 15 MMOL/L — SIGNIFICANT CHANGE UP (ref 5–17)
BUN SERPL-MCNC: 20 MG/DL — SIGNIFICANT CHANGE UP (ref 7–23)
CALCIUM SERPL-MCNC: 7.9 MG/DL — LOW (ref 8.4–10.5)
CHLORIDE SERPL-SCNC: 95 MMOL/L — LOW (ref 96–108)
CO2 SERPL-SCNC: 25 MMOL/L — SIGNIFICANT CHANGE UP (ref 22–31)
CREAT SERPL-MCNC: 4.31 MG/DL — HIGH (ref 0.5–1.3)
GLUCOSE SERPL-MCNC: 141 MG/DL — HIGH (ref 70–99)
HBV CORE AB SER-ACNC: SIGNIFICANT CHANGE UP
HBV SURFACE AB SER-ACNC: <3 MIU/ML — LOW
HCT VFR BLD CALC: 25.7 % — LOW (ref 34.5–45)
HCV AB S/CO SERPL IA: 0.16 S/CO — SIGNIFICANT CHANGE UP
HCV AB SERPL-IMP: SIGNIFICANT CHANGE UP
HGB BLD-MCNC: 8 G/DL — LOW (ref 11.5–15.5)
MCHC RBC-ENTMCNC: 31.1 GM/DL — LOW (ref 32–36)
MCHC RBC-ENTMCNC: 31.7 PG — SIGNIFICANT CHANGE UP (ref 27–34)
MCV RBC AUTO: 102 FL — HIGH (ref 80–100)
PLATELET # BLD AUTO: 244 K/UL — SIGNIFICANT CHANGE UP (ref 150–400)
POTASSIUM SERPL-MCNC: 4.5 MMOL/L — SIGNIFICANT CHANGE UP (ref 3.5–5.3)
POTASSIUM SERPL-SCNC: 4.5 MMOL/L — SIGNIFICANT CHANGE UP (ref 3.5–5.3)
RBC # BLD: 2.52 M/UL — LOW (ref 3.8–5.2)
RBC # FLD: 14.1 % — SIGNIFICANT CHANGE UP (ref 10.3–14.5)
SODIUM SERPL-SCNC: 135 MMOL/L — SIGNIFICANT CHANGE UP (ref 135–145)
WBC # BLD: 4.33 K/UL — SIGNIFICANT CHANGE UP (ref 3.8–10.5)
WBC # FLD AUTO: 4.33 K/UL — SIGNIFICANT CHANGE UP (ref 3.8–10.5)

## 2017-08-19 RX ORDER — ACETAMINOPHEN 500 MG
650 TABLET ORAL ONCE
Qty: 0 | Refills: 0 | Status: COMPLETED | OUTPATIENT
Start: 2017-08-19 | End: 2017-08-19

## 2017-08-19 RX ORDER — ERYTHROPOIETIN 10000 [IU]/ML
10000 INJECTION, SOLUTION INTRAVENOUS; SUBCUTANEOUS ONCE
Qty: 0 | Refills: 0 | Status: COMPLETED | OUTPATIENT
Start: 2017-08-19 | End: 2017-08-19

## 2017-08-19 RX ORDER — INSULIN LISPRO 100/ML
1 VIAL (ML) SUBCUTANEOUS
Qty: 0 | Refills: 0 | Status: DISCONTINUED | OUTPATIENT
Start: 2017-08-19 | End: 2017-08-22

## 2017-08-19 RX ADMIN — ATORVASTATIN CALCIUM 20 MILLIGRAM(S): 80 TABLET, FILM COATED ORAL at 22:37

## 2017-08-19 RX ADMIN — Medication 1 EACH: at 15:26

## 2017-08-19 RX ADMIN — LISINOPRIL 40 MILLIGRAM(S): 2.5 TABLET ORAL at 22:37

## 2017-08-19 RX ADMIN — CINACALCET 60 MILLIGRAM(S): 30 TABLET, FILM COATED ORAL at 13:16

## 2017-08-19 RX ADMIN — Medication 650 MILLIGRAM(S): at 18:14

## 2017-08-19 RX ADMIN — Medication 100 MILLIGRAM(S): at 22:37

## 2017-08-19 RX ADMIN — CLOPIDOGREL BISULFATE 75 MILLIGRAM(S): 75 TABLET, FILM COATED ORAL at 22:37

## 2017-08-19 RX ADMIN — Medication 100 MILLIGRAM(S): at 06:23

## 2017-08-19 RX ADMIN — OXYCODONE AND ACETAMINOPHEN 1 TABLET(S): 5; 325 TABLET ORAL at 22:36

## 2017-08-19 RX ADMIN — SEVELAMER CARBONATE 2400 MILLIGRAM(S): 2400 POWDER, FOR SUSPENSION ORAL at 06:24

## 2017-08-19 RX ADMIN — OXYCODONE AND ACETAMINOPHEN 1 TABLET(S): 5; 325 TABLET ORAL at 23:00

## 2017-08-19 RX ADMIN — SEVELAMER CARBONATE 2400 MILLIGRAM(S): 2400 POWDER, FOR SUSPENSION ORAL at 22:37

## 2017-08-19 RX ADMIN — SEVELAMER CARBONATE 2400 MILLIGRAM(S): 2400 POWDER, FOR SUSPENSION ORAL at 13:18

## 2017-08-19 RX ADMIN — Medication 650 MILLIGRAM(S): at 18:44

## 2017-08-19 RX ADMIN — DULOXETINE HYDROCHLORIDE 60 MILLIGRAM(S): 30 CAPSULE, DELAYED RELEASE ORAL at 13:16

## 2017-08-19 RX ADMIN — Medication 100 MILLIGRAM(S): at 13:18

## 2017-08-19 RX ADMIN — ERYTHROPOIETIN 10000 UNIT(S): 10000 INJECTION, SOLUTION INTRAVENOUS; SUBCUTANEOUS at 11:28

## 2017-08-19 RX ADMIN — Medication 81 MILLIGRAM(S): at 13:16

## 2017-08-19 RX ADMIN — Medication 1 TABLET(S): at 13:16

## 2017-08-19 NOTE — CHART NOTE - NSCHARTNOTEFT_GEN_A_CORE
Endocrine chart note:  Received call from team - patient requesting humalog in insulin pump, not novolog. FS currently controlled on current pump settings. Pump to be refilled with insulin tody. Order changed in sunrise.    Sky Ley MD  Endocrine Fellow

## 2017-08-19 NOTE — PROGRESS NOTE ADULT - SUBJECTIVE AND OBJECTIVE BOX
[facility]   [phone]  DIALYSIS NOTE  Chief Complaint: ESRD/Ongoing hemodialysis requirement. seen on hd    24 hour events/subjective:      states has left leg pain      ALLERGIES & MEDICATIONS  --------------------------------------------------------------------------------  Allergies    No Known Allergies    Intolerances      Standing Inpatient Medications  oxyCODONE    5 mG/acetaminophen 325 mG 1 Tablet(s) Oral at bedtime  aspirin enteric coated 81 milliGRAM(s) Oral daily  DULoxetine 60 milliGRAM(s) Oral daily  atorvastatin 20 milliGRAM(s) Oral at bedtime  clopidogrel Tablet 75 milliGRAM(s) Oral at bedtime  metoprolol succinate  milliGRAM(s) Oral at bedtime  cinacalcet 60 milliGRAM(s) Oral daily  sevelamer hydrochloride 2400 milliGRAM(s) Oral every 8 hours  hydrALAZINE 100 milliGRAM(s) Oral three times a day  multivitamin 1 Tablet(s) Oral daily  heparin  Injectable 5000 Unit(s) SubCutaneous every 12 hours  lisinopril 40 milliGRAM(s) Oral at bedtime  dextrose 5%. 1000 milliLiter(s) IV Continuous <Continuous>  dextrose 50% Injectable 12.5 Gram(s) IV Push once  dextrose 50% Injectable 25 Gram(s) IV Push once  dextrose 50% Injectable 25 Gram(s) IV Push once  insulin aspart (NovoLOG) Pump 1 Each SubCutaneous Continuous Pump    PRN Inpatient Medications  dextrose Gel 1 Dose(s) Oral once PRN  glucagon  Injectable 1 milliGRAM(s) IntraMuscular once PRN      REVIEW OF SYSTEMS  --------------------------------------------------------------------------------  no itching or rash  no fever or chill  no cp or palp   no sob or cough   no N/V/D/ no abd pain   ext states edema is better        VITALS/PHYSICAL EXAM  --------------------------------------------------------------------------------  T(C): 36.7 (08-19-17 @ 08:20), Max: 37.1 (08-18-17 @ 21:55)  HR: 73 (08-19-17 @ 08:20) (67 - 73)  BP: 169/84 (08-19-17 @ 08:20) (157/74 - 190/72)  RR: 18 (08-19-17 @ 08:20) (18 - 18)  SpO2: 98% (08-19-17 @ 08:20) (97% - 100%)  Wt(kg): --  Height (cm): 162.56 (08-17-17 @ 23:40)  Weight (kg): 61.2 (08-17-17 @ 23:40)  BMI (kg/m2): 23.2 (08-17-17 @ 23:40)  BSA (m2): 1.65 (08-17-17 @ 23:40)      08-18-17 @ 07:01  -  08-19-17 @ 07:00  --------------------------------------------------------  IN: 1180 mL / OUT: 4400 mL / NET: -3220 mL      Physical Exam:  		    	Gen: alert oriented place person and date   	Pulm: Decreased breath sounds b/l bases no rales or ronchi  	CV: RRR, S1/S2  	Abd: +BS, soft, nontender/nondistended  	Extremity: No cyanosis, decrease 1+  edema no clubbing  		    LABS/STUDIES  --------------------------------------------------------------------------------              8.0    4.33  >-----------<  244      [08-19-17 @ 10:16]              25.7     134  |  89  |  31  ----------------------------<  345      [08-18-17 @ 08:43]  4.5   |  25  |  4.46        Ca     7.7     [08-18-17 @ 08:43]      Mg     2.1     [08-18-17 @ 08:43]      Phos  3.9     [08-18-17 @ 08:43]    TPro  6.9  /  Alb  4.1  /  TBili  0.4  /  DBili  x   /  AST  49  /  ALT  60  /  AlkPhos  246  [08-17-17 @ 17:55]    PT/INR: PT 12.4 , INR 1.14       [08-17-17 @ 17:55]  PTT: 28.5       [08-17-17 @ 17:55]    Troponin 0.10      [08-17-17 @ 17:55]        [08-18-17 @ 08:43]            imp/suggest: ESRD      Hemodialysis Prescription:  	Access: avf    	Dialyzer: revaclear 300  	Blood Flow (mL/Min): 400  	Dialysate Flow (mL/Min): 600  	Target UF (Liters): 3 liter  	Treatment Time: 210 min   	Potassium:  2k   	Calcium: 2.5  	  YOLANDA   epogen 95096 units   Vitamin D     continue with hd   see hd flow sheet

## 2017-08-19 NOTE — PROGRESS NOTE ADULT - SUBJECTIVE AND OBJECTIVE BOX
Pulmonary Consult Note    NATHAN MEEKS  MRN-[id number]    Chief Complaint: Patient is a 62y old  Female who presents with a chief complaint of SOB/lightheadedness (18 Aug 2017 00:36)  This minimal and comfort  Less SOB  No Cough  s/p HD Friday      HPI:  62yFemale  -COPD  CHF  CAD  CVA DM   Rwenal Failure with HD  -    ROS:  -as above    All other systems reviewed and negative    ACTIVE MEDICATION LIST:  MEDICATIONS  (STANDING):  oxyCODONE    5 mG/acetaminophen 325 mG 1 Tablet(s) Oral at bedtime  aspirin enteric coated 81 milliGRAM(s) Oral daily  DULoxetine 60 milliGRAM(s) Oral daily  atorvastatin 20 milliGRAM(s) Oral at bedtime  clopidogrel Tablet 75 milliGRAM(s) Oral at bedtime  metoprolol succinate  milliGRAM(s) Oral at bedtime  cinacalcet 60 milliGRAM(s) Oral daily  sevelamer hydrochloride 2400 milliGRAM(s) Oral every 8 hours  hydrALAZINE 100 milliGRAM(s) Oral three times a day  multivitamin 1 Tablet(s) Oral daily  heparin  Injectable 5000 Unit(s) SubCutaneous every 12 hours  lisinopril 40 milliGRAM(s) Oral at bedtime  dextrose 5%. 1000 milliLiter(s) (50 mL/Hr) IV Continuous <Continuous>  dextrose 50% Injectable 12.5 Gram(s) IV Push once  dextrose 50% Injectable 25 Gram(s) IV Push once  dextrose 50% Injectable 25 Gram(s) IV Push once  insulin aspart (NovoLOG) Pump 1 Each SubCutaneous Continuous Pump    MEDICATIONS  (PRN):  dextrose Gel 1 Dose(s) Oral once PRN Blood Glucose LESS THAN 70 milliGRAM(s)/deciliter  glucagon  Injectable 1 milliGRAM(s) IntraMuscular once PRN Glucose LESS THAN 70 milligrams/deciliter      EXAM:  Vital Signs Last 24 Hrs  T(C): 36.9 (19 Aug 2017 04:51), Max: 37.1 (18 Aug 2017 21:55)  T(F): 98.4 (19 Aug 2017 04:51), Max: 98.7 (18 Aug 2017 21:55)  HR: 67 (19 Aug 2017 04:51) (67 - 73)  BP: 171/67 (19 Aug 2017 04:51) (157/74 - 190/72)  BP(mean): --  RR: 18 (19 Aug 2017 04:< from: CT Angio Chest w/ IV Cont (08.17.17 @ 21:22) >  No pulmonary embolus.    Pulmonary edema. Small pleural effusions.    Mediastinal lymphadenopathy.    Mildly enlarged upper abdominal lymph node.    < end of copied text >  < from: CT Angio Chest w/ IV Cont (08.17.17 @ 21:22) >  No pulmonary embolus.    Pulmonary edema. Small pleural effusions.    Mediastinal lymphadenopathy.    Mildly enlarged upper abdominal lymph node.                        7.9    4.42  )-----------( 220      ( 18 Aug 2017 07:21 )             24.8   08-18    134<L>  |  89<L>  |  31<H>  ----------------------------<  345<H>  4.5   |  25  |  4.46<H>    Ca    7.7<L>      18 Aug 2017 08:43  Phos  3.9     08-18  Mg     2.1     08-18    TPro  6.9  /  Alb  4.1  /  TBili  0.4  /  DBili  x   /  AST  49<H>  /  ALT  60<H>  /  AlkPhos  246<H>  08-17      < end of copied text >  51) (18 - 18)  SpO2: 97% (19 Aug 2017 04:51) (97% - 100%)    GENERAL: No acute distress  NEURO: Alert and oriented x 3  LUNGS: No wheeze ronchi crackles or wheeze  CV: S1/S2,  1/6 M no S3S4   BSX4 neg reboung guarding  +++Edema     PROBLEM LIST:  62yFemale with HEALTH ISSUES - PROBLEM Dx:  CHF Reanl failure with fluid overload  non sp abd pain  Hyperlipidemia, unspecified hyperlipidemia type: Hyperlipidemia, unspecified hyperlipidemia type  Essential hypertension: Essential hypertension  Insulin pump fitting or adjustment: Insulin pump fitting or adjustment  Localized edema: Localized edema  Abdominal pain, unspecified location: Abdominal pain, unspecified location  Macrocytosis: Macrocytosis  Lymphadenopathy: Lymphadenopathy -   Coronary artery disease involving native coronary artery of native heart without angina pectoris: Coronary artery disease involving native coronary artery of native heart without angina pectoris  ESRD (end stage renal disease): ESRD (end stage renal disease)  Type 1 diabetes mellitus with diabetic peripheral angiopathy without gangrene: Type 1 diabetes mellitus with diabetic peripheral angiopathy without gangrene  Accelerated hypertension: Accelerated hypertension  Acute on chronic combined systolic (congestive) and diastolic (congestive) heart failure: Acute on chronic combined systolic (congestive) and diastolic (congestive) heart failure            RECS:  -renal HD   cardiology noted    -if possible O2 sat ambulating    Thank you for this consultation, please feel free to call with any questions [phone]  Braden Thompson DO [facility]

## 2017-08-19 NOTE — PROGRESS NOTE ADULT - SUBJECTIVE AND OBJECTIVE BOX
Patient is a 62y old  Female who presents with a chief complaint of SOB/lightheadedness (18 Aug 2017 00:36)      SUBJECTIVE / OVERNIGHT EVENTS: no dyspnea, ptnc/o leg pain, tolerated HD yesterday    MEDICATIONS  (STANDING):  oxyCODONE    5 mG/acetaminophen 325 mG 1 Tablet(s) Oral at bedtime  aspirin enteric coated 81 milliGRAM(s) Oral daily  DULoxetine 60 milliGRAM(s) Oral daily  atorvastatin 20 milliGRAM(s) Oral at bedtime  clopidogrel Tablet 75 milliGRAM(s) Oral at bedtime  metoprolol succinate  milliGRAM(s) Oral at bedtime  cinacalcet 60 milliGRAM(s) Oral daily  sevelamer hydrochloride 2400 milliGRAM(s) Oral every 8 hours  hydrALAZINE 100 milliGRAM(s) Oral three times a day  multivitamin 1 Tablet(s) Oral daily  heparin  Injectable 5000 Unit(s) SubCutaneous every 12 hours  lisinopril 40 milliGRAM(s) Oral at bedtime  dextrose 5%. 1000 milliLiter(s) (50 mL/Hr) IV Continuous <Continuous>  dextrose 50% Injectable 12.5 Gram(s) IV Push once  dextrose 50% Injectable 25 Gram(s) IV Push once  dextrose 50% Injectable 25 Gram(s) IV Push once  insulin lispro (HumaLOG) Pump 1 Each SubCutaneous Continuous Pump    MEDICATIONS  (PRN):  dextrose Gel 1 Dose(s) Oral once PRN Blood Glucose LESS THAN 70 milliGRAM(s)/deciliter  glucagon  Injectable 1 milliGRAM(s) IntraMuscular once PRN Glucose LESS THAN 70 milligrams/deciliter      Vital Signs Last 24 Hrs  T(F): 98.5 (08-19-17 @ 13:12), Max: 98.7 (08-18-17 @ 21:55)  HR: 70 (08-19-17 @ 13:12) (59 - 73)  BP: 176/66 (08-19-17 @ 13:12) (157/74 - 190/72)  RR: 18 (08-19-17 @ 13:12) (18 - 18)  SpO2: 100% (08-19-17 @ 13:12) (97% - 100%)  Telemetry:   CAPILLARY BLOOD GLUCOSE  204 (19 Aug 2017 17:17)  134 (19 Aug 2017 13:12)  141 (19 Aug 2017 07:16)  103 (18 Aug 2017 22:27)        I&O's Summary    18 Aug 2017 07:01  -  19 Aug 2017 07:00  --------------------------------------------------------  IN: 1180 mL / OUT: 4400 mL / NET: -3220 mL    19 Aug 2017 07:01  -  19 Aug 2017 20:45  --------------------------------------------------------  IN: 200 mL / OUT: 3000 mL / NET: -2800 mL        PHYSICAL EXAM:  GENERAL: NAD, well-developed  HEAD:  Atraumatic, Normocephalic  EYES: EOMI, PERRLA, conjunctiva and sclera clear  NECK: Supple, No JVD  CHEST/LUNG: Clear to auscultation bilaterally; No wheeze  HEART: Regular rate and rhythm; No murmurs, rubs, or gallops  ABDOMEN: Soft, Nontender, Nondistended; Bowel sounds present  EXTREMITIES:  2+ Peripheral Pulses, No clubbing, cyanosis, or edema  PSYCH: AAOx3  NEUROLOGY: non-focal  SKIN: No rashes or lesions    LABS:                        8.0    4.33  )-----------( 244      ( 19 Aug 2017 10:16 )             25.7     08-19    135  |  95<L>  |  20  ----------------------------<  141<H>  4.5   |  25  |  4.31<H>    Ca    7.9<L>      19 Aug 2017 10:16  Phos  3.9     08-18  Mg     2.1     08-18        CARDIAC MARKERS ( 18 Aug 2017 08:43 )  x     / x     / 266 U/L / x     / x      CARDIAC MARKERS ( 18 Aug 2017 06:53 )  x     / x     / x     / x     / 3.4 ng/mL          RADIOLOGY & ADDITIONAL TESTS:    Imaging Personally Reviewed:    Consultant(s) Notes Reviewed:      Care Discussed with Consultants/Other Providers:

## 2017-08-20 LAB
ANION GAP SERPL CALC-SCNC: 16 MMOL/L — SIGNIFICANT CHANGE UP (ref 5–17)
BUN SERPL-MCNC: 12 MG/DL — SIGNIFICANT CHANGE UP (ref 7–23)
CALCIUM SERPL-MCNC: 7.7 MG/DL — LOW (ref 8.4–10.5)
CHLORIDE SERPL-SCNC: 96 MMOL/L — SIGNIFICANT CHANGE UP (ref 96–108)
CO2 SERPL-SCNC: 25 MMOL/L — SIGNIFICANT CHANGE UP (ref 22–31)
CREAT SERPL-MCNC: 3.37 MG/DL — HIGH (ref 0.5–1.3)
GLUCOSE SERPL-MCNC: 95 MG/DL — SIGNIFICANT CHANGE UP (ref 70–99)
HCT VFR BLD CALC: 27.9 % — LOW (ref 34.5–45)
HGB BLD-MCNC: 8.5 G/DL — LOW (ref 11.5–15.5)
MCHC RBC-ENTMCNC: 30.5 GM/DL — LOW (ref 32–36)
MCHC RBC-ENTMCNC: 31.6 PG — SIGNIFICANT CHANGE UP (ref 27–34)
MCV RBC AUTO: 103.7 FL — HIGH (ref 80–100)
PLATELET # BLD AUTO: 268 K/UL — SIGNIFICANT CHANGE UP (ref 150–400)
POTASSIUM SERPL-MCNC: 4.1 MMOL/L — SIGNIFICANT CHANGE UP (ref 3.5–5.3)
POTASSIUM SERPL-SCNC: 4.1 MMOL/L — SIGNIFICANT CHANGE UP (ref 3.5–5.3)
RBC # BLD: 2.69 M/UL — LOW (ref 3.8–5.2)
RBC # FLD: 14.3 % — SIGNIFICANT CHANGE UP (ref 10.3–14.5)
SODIUM SERPL-SCNC: 137 MMOL/L — SIGNIFICANT CHANGE UP (ref 135–145)
WBC # BLD: 3.37 K/UL — LOW (ref 3.8–10.5)
WBC # FLD AUTO: 3.37 K/UL — LOW (ref 3.8–10.5)

## 2017-08-20 RX ORDER — ONDANSETRON 8 MG/1
4 TABLET, FILM COATED ORAL ONCE
Qty: 0 | Refills: 0 | Status: COMPLETED | OUTPATIENT
Start: 2017-08-20 | End: 2017-08-20

## 2017-08-20 RX ORDER — ACETAMINOPHEN 500 MG
650 TABLET ORAL ONCE
Qty: 0 | Refills: 0 | Status: COMPLETED | OUTPATIENT
Start: 2017-08-20 | End: 2017-08-20

## 2017-08-20 RX ORDER — OXYCODONE HYDROCHLORIDE 5 MG/1
5 TABLET ORAL ONCE
Qty: 0 | Refills: 0 | Status: DISCONTINUED | OUTPATIENT
Start: 2017-08-20 | End: 2017-08-20

## 2017-08-20 RX ADMIN — Medication 650 MILLIGRAM(S): at 23:37

## 2017-08-20 RX ADMIN — OXYCODONE AND ACETAMINOPHEN 1 TABLET(S): 5; 325 TABLET ORAL at 21:05

## 2017-08-20 RX ADMIN — CINACALCET 60 MILLIGRAM(S): 30 TABLET, FILM COATED ORAL at 11:44

## 2017-08-20 RX ADMIN — Medication 100 MILLIGRAM(S): at 21:05

## 2017-08-20 RX ADMIN — SEVELAMER CARBONATE 2400 MILLIGRAM(S): 2400 POWDER, FOR SUSPENSION ORAL at 11:42

## 2017-08-20 RX ADMIN — DULOXETINE HYDROCHLORIDE 60 MILLIGRAM(S): 30 CAPSULE, DELAYED RELEASE ORAL at 11:45

## 2017-08-20 RX ADMIN — OXYCODONE HYDROCHLORIDE 5 MILLIGRAM(S): 5 TABLET ORAL at 07:00

## 2017-08-20 RX ADMIN — ATORVASTATIN CALCIUM 20 MILLIGRAM(S): 80 TABLET, FILM COATED ORAL at 21:05

## 2017-08-20 RX ADMIN — SEVELAMER CARBONATE 2400 MILLIGRAM(S): 2400 POWDER, FOR SUSPENSION ORAL at 05:33

## 2017-08-20 RX ADMIN — Medication 100 MILLIGRAM(S): at 05:33

## 2017-08-20 RX ADMIN — Medication 100 MILLIGRAM(S): at 15:03

## 2017-08-20 RX ADMIN — Medication 81 MILLIGRAM(S): at 11:43

## 2017-08-20 RX ADMIN — CLOPIDOGREL BISULFATE 75 MILLIGRAM(S): 75 TABLET, FILM COATED ORAL at 21:05

## 2017-08-20 RX ADMIN — OXYCODONE AND ACETAMINOPHEN 1 TABLET(S): 5; 325 TABLET ORAL at 21:35

## 2017-08-20 RX ADMIN — OXYCODONE HYDROCHLORIDE 5 MILLIGRAM(S): 5 TABLET ORAL at 06:29

## 2017-08-20 RX ADMIN — SEVELAMER CARBONATE 2400 MILLIGRAM(S): 2400 POWDER, FOR SUSPENSION ORAL at 17:41

## 2017-08-20 RX ADMIN — Medication 1 TABLET(S): at 11:43

## 2017-08-20 RX ADMIN — ONDANSETRON 4 MILLIGRAM(S): 8 TABLET, FILM COATED ORAL at 22:26

## 2017-08-20 RX ADMIN — LISINOPRIL 40 MILLIGRAM(S): 2.5 TABLET ORAL at 21:05

## 2017-08-20 NOTE — PROGRESS NOTE ADULT - PROBLEM SELECTOR PROBLEM 8
Abdominal pain, unspecified location

## 2017-08-20 NOTE — PROGRESS NOTE ADULT - SUBJECTIVE AND OBJECTIVE BOX
Pulmonary Consult Note    NATHAN MEEKS  MRN-[id number]    Chief Complaint: Patient is a 62y old  Female who presents with a chief complaint of SOB/lightheadedness (18 Aug 2017 00:36)      HPI:  62yFemale  -CHF Fluid Overload   Renal failure HD  COPD  memory loss  -      ACTIVE MEDICATION LIST:  MEDICATIONS  (STANDING):  oxyCODONE    5 mG/acetaminophen 325 mG 1 Tablet(s) Oral at bedtime  aspirin enteric coated 81 milliGRAM(s) Oral daily  DULoxetine 60 milliGRAM(s) Oral daily  atorvastatin 20 milliGRAM(s) Oral at bedtime  clopidogrel Tablet 75 milliGRAM(s) Oral at bedtime  metoprolol succinate  milliGRAM(s) Oral at bedtime  cinacalcet 60 milliGRAM(s) Oral daily  sevelamer hydrochloride 2400 milliGRAM(s) Oral every 8 hours  hydrALAZINE 100 milliGRAM(s) Oral three times a day  multivitamin 1 Tablet(s) Oral daily  heparin  Injectable 5000 Unit(s) SubCutaneous every 12 hours  lisinopril 40 milliGRAM(s) Oral at bedtime  dextrose 5%. 1000 milliLiter(s) (50 mL/Hr) IV Continuous <Continuous>  dextrose 50% Injectable 12.5 Gram(s) IV Push once  dextrose 50% Injectable 25 Gram(s) IV Push once  dextrose 50% Injectable 25 Gram(s) IV Push once  insulin lispro (HumaLOG) Pump 1 Each SubCutaneous Continuous Pump    MEDICATIONS  (PRN):  dextrose Gel 1 Dose(s) Oral once PRN Blood Glucose LESS THAN 70 milliGRAM(s)/deciliter  glucagon  Injectable 1 milliGRAM(s) IntraMuscular once PRN Glucose LESS THAN 70 milligrams/deciliter      EXAM:  Vital Signs Last 24 Hrs  T(C): 36.7 (20 Aug 2017 04:23), Max: 36.9 (19 Aug 2017 13:12)  T(F): 98.1 (20 Aug 2017 04:23), Max: 98.5 (19 Aug 2017 13:12)  HR: 61 (20 Aug 2017 04:23) (59 - 74)  BP: 166/54 (20 Aug 2017 04:23) (146/61 - 184/89)  BP(mean): --  RR: 18 (20 Aug 2017 04:23) (18 - 18)  SpO2: 100% (20 Aug 2017 04:23) (98% - 100%)    GENERAL: No acute distress  NEURO: Alert and oriented x 3  LUNGS: Clear to auscultation bilaterally, no rales or wheezes  CV: S1/S2, 2/6murmur  trace edema  PROBLEM LIST:  62yFemale with HEALTH ISSUES - PROBLEM Dx:  CHF Fluid Overload  Non active COPD  Hyperlipidemia, unspecified hyperlipidemia type: Hyperlipidemia, unspecified hyperlipidemia type  Essential hypertension: Essential hypertension  Insulin pump fitting or adjustment: Insulin pump fitting or adjustment  Localized edema: Localized edema  Abdominal pain, unspecified location: Abdominal pain, unspecified location  Macrocytosis: Macrocytosis  Lymphadenopathy: Lymphadenopathy  Coronary artery disease involving native coronary artery of native heart without angina pectoris: Coronary artery disease involving native coronary artery of native heart without angina pectoris  ESRD (end stage renal disease): ESRD (end stage renal disease)  Type 1 diabetes mellitus with diabetic peripheral angiopathy without gangrene: Type 1 diabetes mellitus with diabetic peripheral angiopathy without gangrene  Accelerated hypertension: Accelerated hypertension  Acute on chronic combined systolic (congestive) and diastolic (congestive) heart failure: Acute on chronic combined systolic (congestive) and diastolic (congestive) heart failure            RECS:  -HD per renal   Meds reviewed  Last CXR  demonstrated some worsening signs CHF and can F/U CXR  -    Thank you for this consultation, please feel free to call with any questions [phone]  Braden Thompson

## 2017-08-21 LAB
ANION GAP SERPL CALC-SCNC: 16 MMOL/L — SIGNIFICANT CHANGE UP (ref 5–17)
BUN SERPL-MCNC: 25 MG/DL — HIGH (ref 7–23)
CALCIUM SERPL-MCNC: 8.1 MG/DL — LOW (ref 8.4–10.5)
CHLORIDE SERPL-SCNC: 93 MMOL/L — LOW (ref 96–108)
CO2 SERPL-SCNC: 25 MMOL/L — SIGNIFICANT CHANGE UP (ref 22–31)
CREAT SERPL-MCNC: 5.49 MG/DL — HIGH (ref 0.5–1.3)
GLUCOSE SERPL-MCNC: 180 MG/DL — HIGH (ref 70–99)
HCT VFR BLD CALC: 28.2 % — LOW (ref 34.5–45)
HGB BLD-MCNC: 8.6 G/DL — LOW (ref 11.5–15.5)
MCHC RBC-ENTMCNC: 30.5 GM/DL — LOW (ref 32–36)
MCHC RBC-ENTMCNC: 31.6 PG — SIGNIFICANT CHANGE UP (ref 27–34)
MCV RBC AUTO: 103.7 FL — HIGH (ref 80–100)
PLATELET # BLD AUTO: 298 K/UL — SIGNIFICANT CHANGE UP (ref 150–400)
POTASSIUM SERPL-MCNC: 4.5 MMOL/L — SIGNIFICANT CHANGE UP (ref 3.5–5.3)
POTASSIUM SERPL-SCNC: 4.5 MMOL/L — SIGNIFICANT CHANGE UP (ref 3.5–5.3)
RBC # BLD: 2.72 M/UL — LOW (ref 3.8–5.2)
RBC # FLD: 14.2 % — SIGNIFICANT CHANGE UP (ref 10.3–14.5)
SODIUM SERPL-SCNC: 134 MMOL/L — LOW (ref 135–145)
WBC # BLD: 4.62 K/UL — SIGNIFICANT CHANGE UP (ref 3.8–10.5)
WBC # FLD AUTO: 4.62 K/UL — SIGNIFICANT CHANGE UP (ref 3.8–10.5)

## 2017-08-21 PROCEDURE — 93971 EXTREMITY STUDY: CPT | Mod: 26

## 2017-08-21 PROCEDURE — 99232 SBSQ HOSP IP/OBS MODERATE 35: CPT

## 2017-08-21 RX ORDER — OXYCODONE HYDROCHLORIDE 5 MG/1
5 TABLET ORAL ONCE
Qty: 0 | Refills: 0 | Status: DISCONTINUED | OUTPATIENT
Start: 2017-08-21 | End: 2017-08-21

## 2017-08-21 RX ORDER — ACETAMINOPHEN 500 MG
650 TABLET ORAL ONCE
Qty: 0 | Refills: 0 | Status: DISCONTINUED | OUTPATIENT
Start: 2017-08-21 | End: 2017-08-22

## 2017-08-21 RX ORDER — ERYTHROPOIETIN 10000 [IU]/ML
10000 INJECTION, SOLUTION INTRAVENOUS; SUBCUTANEOUS ONCE
Qty: 0 | Refills: 0 | Status: COMPLETED | OUTPATIENT
Start: 2017-08-21 | End: 2017-08-21

## 2017-08-21 RX ADMIN — ATORVASTATIN CALCIUM 20 MILLIGRAM(S): 80 TABLET, FILM COATED ORAL at 22:26

## 2017-08-21 RX ADMIN — DULOXETINE HYDROCHLORIDE 60 MILLIGRAM(S): 30 CAPSULE, DELAYED RELEASE ORAL at 13:12

## 2017-08-21 RX ADMIN — LISINOPRIL 40 MILLIGRAM(S): 2.5 TABLET ORAL at 22:27

## 2017-08-21 RX ADMIN — OXYCODONE AND ACETAMINOPHEN 1 TABLET(S): 5; 325 TABLET ORAL at 22:27

## 2017-08-21 RX ADMIN — Medication 100 MILLIGRAM(S): at 13:11

## 2017-08-21 RX ADMIN — Medication 100 MILLIGRAM(S): at 22:26

## 2017-08-21 RX ADMIN — CLOPIDOGREL BISULFATE 75 MILLIGRAM(S): 75 TABLET, FILM COATED ORAL at 22:26

## 2017-08-21 RX ADMIN — CINACALCET 60 MILLIGRAM(S): 30 TABLET, FILM COATED ORAL at 13:10

## 2017-08-21 RX ADMIN — Medication 1 TABLET(S): at 13:11

## 2017-08-21 RX ADMIN — OXYCODONE AND ACETAMINOPHEN 1 TABLET(S): 5; 325 TABLET ORAL at 22:57

## 2017-08-21 RX ADMIN — SEVELAMER CARBONATE 2400 MILLIGRAM(S): 2400 POWDER, FOR SUSPENSION ORAL at 17:38

## 2017-08-21 RX ADMIN — Medication 100 MILLIGRAM(S): at 05:40

## 2017-08-21 RX ADMIN — ERYTHROPOIETIN 10000 UNIT(S): 10000 INJECTION, SOLUTION INTRAVENOUS; SUBCUTANEOUS at 11:19

## 2017-08-21 RX ADMIN — Medication 81 MILLIGRAM(S): at 13:12

## 2017-08-21 RX ADMIN — OXYCODONE HYDROCHLORIDE 5 MILLIGRAM(S): 5 TABLET ORAL at 06:10

## 2017-08-21 RX ADMIN — Medication 100 MILLIGRAM(S): at 22:27

## 2017-08-21 RX ADMIN — OXYCODONE HYDROCHLORIDE 5 MILLIGRAM(S): 5 TABLET ORAL at 05:40

## 2017-08-21 RX ADMIN — SEVELAMER CARBONATE 2400 MILLIGRAM(S): 2400 POWDER, FOR SUSPENSION ORAL at 13:10

## 2017-08-21 RX ADMIN — Medication 650 MILLIGRAM(S): at 00:00

## 2017-08-21 NOTE — PROGRESS NOTE ADULT - ASSESSMENT
61 y/o F w/ Type 1 DM with ESRD on HD, neuropathy, retinopathy, macrovascular complications on a medtronic insulin pump with HTN, HLD a/w shortness of breath and uncontrolled HTN (high risk patient with high level decision-making).

## 2017-08-21 NOTE — PROGRESS NOTE ADULT - ASSESSMENT
62Fw/ PMHx T1DM on insulin pump with multiple complications and morbid medical conditions, HTN, HLD, former smoker, gout, MI, CAD s/p PCI to RCA and brachytherapy, dCHF, chronic LLE pain and edema in setting of severe PVD s/p L & R fem-pop bypass  graft,  multiple vascular surgery both leg w/ fem-pop bypass revisions , Subclavian vein stenosis, left: s/p stent, on ASA and Plavix,  ESRD on HD (Tu/Thr/Sat) via L AVF, CVA with short term memory deficit, depression, chronic pain management for LLE on oxycodone, s/p cholecystectomy, multiple prior admissions to hospital for c/o hyperglycemia,  increasing leg edema, and chest pain, very recent admission here with DKA and NSTEMI s/p stent and discharged 8/3 now presenting with acute on chronic diastolic and systolic heart failure in setting of accelerated hypertension.  Acute on Chronic Systolic Congestive Heart Failure - continue Rx as per cardiology.   ESRD- continue HD. Nephrology follow.  HTN control  Diabetes type 1 - continue Insulin pump  PVD- stable.   CAD- s/p stents. stable.  PT  discussed withn patuient /  QA  DCP home.

## 2017-08-21 NOTE — PROGRESS NOTE ADULT - SUBJECTIVE AND OBJECTIVE BOX
[facility]   [phone]  DIALYSIS NOTE  Chief Complaint: ESRD/Ongoing hemodialysis requirement. seen on hd    24 hour events/subjective:      c/o N/V O/N      ALLERGIES & MEDICATIONS  --------------------------------------------------------------------------------  Allergies    No Known Allergies    Intolerances      Standing Inpatient Medications  oxyCODONE    5 mG/acetaminophen 325 mG 1 Tablet(s) Oral at bedtime  aspirin enteric coated 81 milliGRAM(s) Oral daily  DULoxetine 60 milliGRAM(s) Oral daily  atorvastatin 20 milliGRAM(s) Oral at bedtime  clopidogrel Tablet 75 milliGRAM(s) Oral at bedtime  metoprolol succinate  milliGRAM(s) Oral at bedtime  cinacalcet 60 milliGRAM(s) Oral daily  sevelamer hydrochloride 2400 milliGRAM(s) Oral every 8 hours  hydrALAZINE 100 milliGRAM(s) Oral three times a day  multivitamin 1 Tablet(s) Oral daily  heparin  Injectable 5000 Unit(s) SubCutaneous every 12 hours  lisinopril 40 milliGRAM(s) Oral at bedtime  dextrose 5%. 1000 milliLiter(s) IV Continuous <Continuous>  dextrose 50% Injectable 12.5 Gram(s) IV Push once  dextrose 50% Injectable 25 Gram(s) IV Push once  dextrose 50% Injectable 25 Gram(s) IV Push once  insulin lispro (HumaLOG) Pump 1 Each SubCutaneous Continuous Pump    PRN Inpatient Medications  dextrose Gel 1 Dose(s) Oral once PRN  glucagon  Injectable 1 milliGRAM(s) IntraMuscular once PRN      REVIEW OF SYSTEMS  --------------------------------------------------------------------------------  no itching or rash  no fever or chill  no cp or palp   no sob or cough   + N/V/ no Diarrhea no abd pain   ext no edema + leg  pain         VITALS/PHYSICAL EXAM  --------------------------------------------------------------------------------  T(C): 36.8 (08-21-17 @ 04:50), Max: 36.8 (08-21-17 @ 04:50)  HR: 60 (08-21-17 @ 04:50) (60 - 72)  BP: 160/70 (08-21-17 @ 04:50) (160/70 - 176/75)  RR: 18 (08-21-17 @ 04:50) (18 - 18)  SpO2: 99% (08-21-17 @ 04:50) (94% - 100%)  Wt(kg): --        08-20-17 @ 07:01  -  08-21-17 @ 07:00  --------------------------------------------------------  IN: 680 mL / OUT: 0 mL / NET: 680 mL      Physical Exam:  		    	Gen: alert oriented place person and date   	Pulm: Decreased breath sounds b/l bases no rales or ronchi  	CV: RRR, S1/S2  	Abd: +BS, soft, nontender/nondistended  	Extremity: No cyanosis, no edema no clubbing  		    LABS/STUDIES  --------------------------------------------------------------------------------              8.5    3.37  >-----------<  268      [08-20-17 @ 09:00]              27.9     137  |  96  |  12  ----------------------------<  95      [08-20-17 @ 09:33]  4.1   |  25  |  3.37        Ca     7.7     [08-20-17 @ 09:33]                    imp/suggest: ESRD      Hemodialysis Prescription:  	Access: avf  	Dialyzer: revaclear 300  	Blood Flow (mL/Min): 400  	Dialysate Flow (mL/Min): 600  	Target UF (Liters): 2-2.5 liter  	Treatment Time: 210 min   	Potassium:  2k   	Calcium: 2.5  	  YOLANDA  67250 units ivp       continue with hd   see hd flow sheet

## 2017-08-21 NOTE — PROGRESS NOTE ADULT - SUBJECTIVE AND OBJECTIVE BOX
Patient is a 62y old  Female who presents with a chief complaint of SOB/lightheadedness (18 Aug 2017 00:36)      SUBJECTIVE / OVERNIGHT EVENTS: Comfortable without new complaints.   Review of Systems  chest pain no  palpitations no  sob no  nausea no  headache no    MEDICATIONS  (STANDING):  oxyCODONE    5 mG/acetaminophen 325 mG 1 Tablet(s) Oral at bedtime  aspirin enteric coated 81 milliGRAM(s) Oral daily  DULoxetine 60 milliGRAM(s) Oral daily  atorvastatin 20 milliGRAM(s) Oral at bedtime  clopidogrel Tablet 75 milliGRAM(s) Oral at bedtime  metoprolol succinate  milliGRAM(s) Oral at bedtime  cinacalcet 60 milliGRAM(s) Oral daily  sevelamer hydrochloride 2400 milliGRAM(s) Oral every 8 hours  hydrALAZINE 100 milliGRAM(s) Oral three times a day  multivitamin 1 Tablet(s) Oral daily  heparin  Injectable 5000 Unit(s) SubCutaneous every 12 hours  lisinopril 40 milliGRAM(s) Oral at bedtime  dextrose 5%. 1000 milliLiter(s) (50 mL/Hr) IV Continuous <Continuous>  dextrose 50% Injectable 12.5 Gram(s) IV Push once  dextrose 50% Injectable 25 Gram(s) IV Push once  dextrose 50% Injectable 25 Gram(s) IV Push once  insulin lispro (HumaLOG) Pump 1 Each SubCutaneous Continuous Pump    MEDICATIONS  (PRN):  dextrose Gel 1 Dose(s) Oral once PRN Blood Glucose LESS THAN 70 milliGRAM(s)/deciliter  glucagon  Injectable 1 milliGRAM(s) IntraMuscular once PRN Glucose LESS THAN 70 milligrams/deciliter  acetaminophen   Tablet. 650 milliGRAM(s) Oral once PRN Moderate Pain (4 - 6)          PHYSICAL EXAM:  GENERAL: NAD, well-developed  HEAD:  Atraumatic, Normocephalic  EYES: EOMI, PERRLA, conjunctiva and sclera clear  NECK: Supple, No JVD  CHEST/LUNG: Clear to auscultation bilaterally; No wheeze  HEART: Regular rate and rhythm; No murmurs, rubs, or gallops  ABDOMEN: Soft, Nontender, Nondistended; Bowel sounds present  EXTREMITIES:  2+ Peripheral Pulses, No clubbing, cyanosis, or edema  PSYCH: AAOx3  NEUROLOGY: non-focal  SKIN: No rashes or lesions    LABS:                        8.6    4.62  )-----------( 298      ( 21 Aug 2017 12:45 )             28.2     08-21    134<L>  |  93<L>  |  25<H>  ----------------------------<  180<H>  4.5   |  25  |  5.49<H>    Ca    8.1<L>      21 Aug 2017 12:45                RADIOLOGY & ADDITIONAL TESTS:    Imaging Personally Reviewed:  < from: VA Duplex Lower Ext Vein Scan, Left (08.21.17 @ 16:26) >    IMPRESSION:     No evidence of left lower extremity deep venous thrombosis.      < end of copied text >    Consultant(s) Notes Reviewed:      Care Discussed with Consultants/Other Providers:

## 2017-08-22 ENCOUNTER — TRANSCRIPTION ENCOUNTER (OUTPATIENT)
Age: 62
End: 2017-08-22

## 2017-08-22 VITALS
RESPIRATION RATE: 18 BRPM | SYSTOLIC BLOOD PRESSURE: 176 MMHG | DIASTOLIC BLOOD PRESSURE: 64 MMHG | TEMPERATURE: 99 F | HEART RATE: 63 BPM | OXYGEN SATURATION: 98 % | WEIGHT: 116.18 LBS

## 2017-08-22 LAB
ANION GAP SERPL CALC-SCNC: 18 MMOL/L — HIGH (ref 5–17)
BUN SERPL-MCNC: 15 MG/DL — SIGNIFICANT CHANGE UP (ref 7–23)
CALCIUM SERPL-MCNC: 7.6 MG/DL — LOW (ref 8.4–10.5)
CHLORIDE SERPL-SCNC: 90 MMOL/L — LOW (ref 96–108)
CO2 SERPL-SCNC: 26 MMOL/L — SIGNIFICANT CHANGE UP (ref 22–31)
CREAT SERPL-MCNC: 3.92 MG/DL — HIGH (ref 0.5–1.3)
GLUCOSE SERPL-MCNC: 398 MG/DL — HIGH (ref 70–99)
HCT VFR BLD CALC: 30.4 % — LOW (ref 34.5–45)
HGB BLD-MCNC: 9.4 G/DL — LOW (ref 11.5–15.5)
MCHC RBC-ENTMCNC: 30.9 GM/DL — LOW (ref 32–36)
MCHC RBC-ENTMCNC: 31.9 PG — SIGNIFICANT CHANGE UP (ref 27–34)
MCV RBC AUTO: 103.1 FL — HIGH (ref 80–100)
PLATELET # BLD AUTO: 317 K/UL — SIGNIFICANT CHANGE UP (ref 150–400)
POTASSIUM SERPL-MCNC: 4.7 MMOL/L — SIGNIFICANT CHANGE UP (ref 3.5–5.3)
POTASSIUM SERPL-SCNC: 4.7 MMOL/L — SIGNIFICANT CHANGE UP (ref 3.5–5.3)
RBC # BLD: 2.95 M/UL — LOW (ref 3.8–5.2)
RBC # FLD: 14.2 % — SIGNIFICANT CHANGE UP (ref 10.3–14.5)
SODIUM SERPL-SCNC: 134 MMOL/L — LOW (ref 135–145)
WBC # BLD: 4.41 K/UL — SIGNIFICANT CHANGE UP (ref 3.8–10.5)
WBC # FLD AUTO: 4.41 K/UL — SIGNIFICANT CHANGE UP (ref 3.8–10.5)

## 2017-08-22 PROCEDURE — 99232 SBSQ HOSP IP/OBS MODERATE 35: CPT

## 2017-08-22 RX ORDER — SEVELAMER CARBONATE 2400 MG/1
3 POWDER, FOR SUSPENSION ORAL
Qty: 0 | Refills: 0 | COMMUNITY

## 2017-08-22 RX ORDER — LISINOPRIL 2.5 MG/1
1 TABLET ORAL
Qty: 0 | Refills: 0 | COMMUNITY
Start: 2017-08-22

## 2017-08-22 RX ORDER — HYDRALAZINE HCL 50 MG
1 TABLET ORAL
Qty: 0 | Refills: 0 | COMMUNITY
Start: 2017-08-22

## 2017-08-22 RX ORDER — DULOXETINE HYDROCHLORIDE 30 MG/1
1 CAPSULE, DELAYED RELEASE ORAL
Qty: 0 | Refills: 0 | COMMUNITY
Start: 2017-08-22

## 2017-08-22 RX ORDER — CLOPIDOGREL BISULFATE 75 MG/1
1 TABLET, FILM COATED ORAL
Qty: 0 | Refills: 0 | COMMUNITY
Start: 2017-08-22

## 2017-08-22 RX ORDER — CINACALCET 30 MG/1
1 TABLET, FILM COATED ORAL
Qty: 0 | Refills: 0 | COMMUNITY
Start: 2017-08-22

## 2017-08-22 RX ORDER — CINACALCET 30 MG/1
1 TABLET, FILM COATED ORAL
Qty: 0 | Refills: 0 | COMMUNITY

## 2017-08-22 RX ORDER — ATORVASTATIN CALCIUM 80 MG/1
1 TABLET, FILM COATED ORAL
Qty: 0 | Refills: 0 | COMMUNITY
Start: 2017-08-22

## 2017-08-22 RX ORDER — METOPROLOL TARTRATE 50 MG
1 TABLET ORAL
Qty: 0 | Refills: 0 | COMMUNITY
Start: 2017-08-22

## 2017-08-22 RX ORDER — SEVELAMER CARBONATE 2400 MG/1
3 POWDER, FOR SUSPENSION ORAL
Qty: 0 | Refills: 0 | COMMUNITY
Start: 2017-08-22

## 2017-08-22 RX ORDER — INSULIN LISPRO 100/ML
0 VIAL (ML) SUBCUTANEOUS
Qty: 0 | Refills: 0 | COMMUNITY

## 2017-08-22 RX ORDER — LISINOPRIL 2.5 MG/1
1 TABLET ORAL
Qty: 0 | Refills: 0 | COMMUNITY

## 2017-08-22 RX ORDER — ASPIRIN/CALCIUM CARB/MAGNESIUM 324 MG
1 TABLET ORAL
Qty: 0 | Refills: 0 | COMMUNITY
Start: 2017-08-22

## 2017-08-22 RX ORDER — INSULIN LISPRO 100/ML
1 VIAL (ML) SUBCUTANEOUS
Qty: 0 | Refills: 0 | COMMUNITY
Start: 2017-08-22

## 2017-08-22 RX ADMIN — SEVELAMER CARBONATE 2400 MILLIGRAM(S): 2400 POWDER, FOR SUSPENSION ORAL at 11:41

## 2017-08-22 RX ADMIN — Medication 100 MILLIGRAM(S): at 06:25

## 2017-08-22 RX ADMIN — Medication 100 MILLIGRAM(S): at 17:59

## 2017-08-22 RX ADMIN — Medication 1 TABLET(S): at 11:41

## 2017-08-22 RX ADMIN — DULOXETINE HYDROCHLORIDE 60 MILLIGRAM(S): 30 CAPSULE, DELAYED RELEASE ORAL at 11:42

## 2017-08-22 RX ADMIN — SEVELAMER CARBONATE 2400 MILLIGRAM(S): 2400 POWDER, FOR SUSPENSION ORAL at 19:12

## 2017-08-22 RX ADMIN — Medication 81 MILLIGRAM(S): at 11:41

## 2017-08-22 RX ADMIN — SEVELAMER CARBONATE 2400 MILLIGRAM(S): 2400 POWDER, FOR SUSPENSION ORAL at 08:32

## 2017-08-22 RX ADMIN — CINACALCET 60 MILLIGRAM(S): 30 TABLET, FILM COATED ORAL at 11:41

## 2017-08-22 NOTE — DISCHARGE NOTE ADULT - PATIENT PORTAL LINK FT
“You can access the Follow[employer] Patient Portal, offered by [facility], by registering with the following website: [email or web address]”

## 2017-08-22 NOTE — DISCHARGE NOTE ADULT - HOSPITAL COURSE
To be completed by Attending. 61yo female PMH acute coronary syndrome, Coronary Artery Disease s/p stent 2 weeks ago, CVA, DM, ESRD on HD Tu,Th, Sat, gout, hyperlipidemia, PVD, TIA presenting with shortness of breath, feeling lightheaded since last night. Went to HD today and  then went to her doctor's office afterward, Dr. Elizalde. Patient states that she felt very lightheaded and felt like she was going to pass out. H/p pending . Pt  b/p  was  slightly  elevated,  pt  started  on  hydralazine  in  the  ED.   8/18;(1400) HD today for fluid overload/ HF(D). Seen by renal, pulm, Med. Await eval. by Cardio. Pt. takes Oxycodone 5mg/APAP 325mg BID per med. vial brought in by . Percoset prn dose for LE pain given 1415; another dose due (standing) due HS  Admit  Dx  Chronic  pulmonary  edema ESRD Diabetes type 1, Cardiac artery disease

## 2017-08-22 NOTE — PROGRESS NOTE ADULT - SUBJECTIVE AND OBJECTIVE BOX
Chief Complaint: type 1 diabetes     Interval history: Pt's sugars well controlled in the morning yesterday, but then catracho up to 400s by this morning.  Pt states she ate peanut butter crackers last night and did not bolus. She says she did do a correction bolus this morning for her sugar in the 400s.     MEDICATIONS  (STANDING):  oxyCODONE    5 mG/acetaminophen 325 mG 1 Tablet(s) Oral at bedtime  aspirin enteric coated 81 milliGRAM(s) Oral daily  DULoxetine 60 milliGRAM(s) Oral daily  atorvastatin 20 milliGRAM(s) Oral at bedtime  clopidogrel Tablet 75 milliGRAM(s) Oral at bedtime  metoprolol succinate  milliGRAM(s) Oral at bedtime  cinacalcet 60 milliGRAM(s) Oral daily  sevelamer hydrochloride 2400 milliGRAM(s) Oral every 8 hours  hydrALAZINE 100 milliGRAM(s) Oral three times a day  multivitamin 1 Tablet(s) Oral daily  heparin  Injectable 5000 Unit(s) SubCutaneous every 12 hours  lisinopril 40 milliGRAM(s) Oral at bedtime  dextrose 5%. 1000 milliLiter(s) (50 mL/Hr) IV Continuous <Continuous>  dextrose 50% Injectable 12.5 Gram(s) IV Push once  dextrose 50% Injectable 25 Gram(s) IV Push once  dextrose 50% Injectable 25 Gram(s) IV Push once  insulin lispro (HumaLOG) Pump 1 Each SubCutaneous Continuous Pump    MEDICATIONS  (PRN):  dextrose Gel 1 Dose(s) Oral once PRN Blood Glucose LESS THAN 70 milliGRAM(s)/deciliter  glucagon  Injectable 1 milliGRAM(s) IntraMuscular once PRN Glucose LESS THAN 70 milligrams/deciliter  acetaminophen   Tablet. 650 milliGRAM(s) Oral once PRN Moderate Pain (4 - 6)      Allergies    No Known Allergies    Intolerances      Review of Systems:  Skin: no rash  Endocrine: no polyuria, polydipsia    ALL OTHER SYSTEMS REVIEWED AND NEGATIVE    PHYSICAL EXAM:  VITALS: T(C): 36.8 (08-22-17 @ 04:52)  T(F): 98.2 (08-22-17 @ 04:52), Max: 98.2 (08-21-17 @ 13:05)  HR: 64 (08-22-17 @ 04:52) (62 - 70)  BP: 150/65 (08-22-17 @ 06:21) (128/64 - 166/64)  RR:  (18 - 18)  SpO2:  (98% - 100%)  Wt(kg): --  GENERAL: NAD, well-developed, chronically ill appearing   EYES: No proptosis, sclera anicteric  HEENT:  Atraumatic, Normocephalic, moist mucous membranes  SKIN: Dry, intact, No diffuse rashes   PSYCH: Alert and oriented x 3, normal affect, normal mood    CAPILLARY BLOOD GLUCOSE  420 (08-22 @ 07:13)  442 (08-22 @ 07:12)  217 (08-21 @ 21:34)  154 (08-21 @ 17:20)  98 (08-21 @ 13:02)  192 (08-21 @ 07:21)  170 (08-20 @ 22:52)  139 (08-20 @ 21:40)  134 (08-20 @ 17:23)  154 (08-20 @ 11:23)  113 (08-20 @ 07:38)  204 (08-19 @ 21:51)  204 (08-19 @ 17:17)  134 (08-19 @ 13:12)      08-22    134<L>  |  90<L>  |  15  ----------------------------<  398<H>  4.7   |  26  |  3.92<H>    EGFR if : 14<L>  EGFR if non : 12<L>    Ca    7.6<L>      08-22            Thyroid Function Tests:      Hemoglobin A1C, Whole Blood: 6.8 % <H> [4.0 - 5.6] (07-24-17 @ 06:15)  Hemoglobin A1C, Whole Blood: 6.8 % <H> [4.0 - 5.6] (07-23-17 @ 22:45)  Hemoglobin A1C, Whole Blood: 7.3 % <H> [4.0 - 5.6] (06-16-17 @ 04:45)

## 2017-08-22 NOTE — DISCHARGE NOTE ADULT - MEDICATION SUMMARY - MEDICATIONS TO CHANGE
I will SWITCH the dose or number of times a day I take the medications listed below when I get home from the hospital:    HumaLOG 100 units/mL subcutaneous solution  -- Humalog Pump  BASAL:  00:00 - 0.25 units/Hr  0500 -0.35 units/hr    Insulin to carb ratio  00:00 1U: 15gram    Insulin Sensitivity factor  00:00 ISF 60  07:00 ISF 40  18:00 ISF 60    Blood glucose target:  00:00 110-130  08:00 100-120

## 2017-08-22 NOTE — PROGRESS NOTE ADULT - ASSESSMENT
imp/suggest: ESRD      Hemodialysis Prescription:  	Access: avf  	Dialyzer: revaclear 300  	Blood Flow (mL/Min): 400  	Dialysate Flow (mL/Min): 600  	Target UF (Liters): one liter   	Treatment Time: 210 min   	Potassium:  2k   	Calcium: 2.5

## 2017-08-22 NOTE — DISCHARGE NOTE ADULT - SECONDARY DIAGNOSIS.
Acute on chronic combined systolic (congestive) and diastolic (congestive) heart failure Diabetes mellitus type 1 ESRD (end stage renal disease) on dialysis Hyperlipidemia, unspecified hyperlipidemia type

## 2017-08-22 NOTE — PROGRESS NOTE ADULT - SUBJECTIVE AND OBJECTIVE BOX
Subjective: Patient seen and examined. No new events except as noted.   Feels much better, leg pain improved less selling no cp or palpitations anxious to go home    MEDICATIONS:  MEDICATIONS  (STANDING):  oxyCODONE    5 mG/acetaminophen 325 mG 1 Tablet(s) Oral at bedtime  aspirin enteric coated 81 milliGRAM(s) Oral daily  DULoxetine 60 milliGRAM(s) Oral daily  atorvastatin 20 milliGRAM(s) Oral at bedtime  clopidogrel Tablet 75 milliGRAM(s) Oral at bedtime  metoprolol succinate  milliGRAM(s) Oral at bedtime  cinacalcet 60 milliGRAM(s) Oral daily  sevelamer hydrochloride 2400 milliGRAM(s) Oral every 8 hours  hydrALAZINE 100 milliGRAM(s) Oral three times a day  multivitamin 1 Tablet(s) Oral daily  heparin  Injectable 5000 Unit(s) SubCutaneous every 12 hours  lisinopril 40 milliGRAM(s) Oral at bedtime  dextrose 5%. 1000 milliLiter(s) (50 mL/Hr) IV Continuous <Continuous>  dextrose 50% Injectable 12.5 Gram(s) IV Push once  dextrose 50% Injectable 25 Gram(s) IV Push once  dextrose 50% Injectable 25 Gram(s) IV Push once  insulin lispro (HumaLOG) Pump 1 Each SubCutaneous Continuous Pump      PHYSICAL EXAM:  T(C): 36.8 (08-22-17 @ 04:52), Max: 36.8 (08-21-17 @ 13:05)  HR: 64 (08-22-17 @ 04:52) (62 - 70)  BP: 150/65 (08-22-17 @ 06:21) (128/64 - 166/64)  RR: 18 (08-22-17 @ 04:52) (18 - 18)  SpO2: 100% (08-22-17 @ 04:52) (98% - 100%)  Wt(kg): --  I&O's Summary    21 Aug 2017 07:01  -  22 Aug 2017 07:00  --------------------------------------------------------  IN: 200 mL / OUT: 2500 mL / NET: -2300 mL    22 Aug 2017 07:01  -  22 Aug 2017 09:44  --------------------------------------------------------  IN: 120 mL / OUT: 0 mL / NET: 120 mL          Appearance: Normal chronically ill appeasing but NAD lying flat	  HEENT:   Normal oral mucosa, PERRL, EOMI	  Cardiovascular: Normal S1 S2, No JVD, No murmurs ,  Respiratory: Lungs clear to auscultation, normal effort 	  Gastrointestinal:  Soft, Non-tender, + BS	  Musculoskeletal: Normal range of motion, normal strength  Psychiatry:  Mood & affect appropriate  Ext: 1+ edema ;eft greater tyah right (improved)        LABS:    CARDIAC MARKERS:                                9.4    4.41  )-----------( 317      ( 22 Aug 2017 07:44 )             30.4     08-21    134<L>  |  93<L>  |  25<H>  ----------------------------<  180<H>  4.5   |  25  |  5.49<H>    Ca    8.1<L>      21 Aug 2017 12:45

## 2017-08-22 NOTE — PROGRESS NOTE ADULT - PROBLEM SELECTOR PROBLEM 2
Insulin pump fitting or adjustment
Accelerated hypertension
Insulin pump fitting or adjustment

## 2017-08-22 NOTE — PROGRESS NOTE ADULT - ASSESSMENT
62Fw/ PMHx T1DM on insulin pump with multiple complications and morbid medical conditions, HTN, HLD, former smoker, gout, MI, CAD s/p PCI to RCA and brachytherapy, dCHF, chronic LLE pain and edema in setting of severe PVD s/p L & R fem-pop bypass  graft,  multiple vascular surgery both leg w/ fem-pop bypass revisions , Subclavian vein stenosis, left: s/p stent, on ASA and Plavix,  ESRD on HD (Tu/Thr/Sat) via L AVF, CVA with short term memory deficit, depression, chronic pain management for LLE on oxycodone, s/p cholecystectomy, multiple prior admissions to hospital for c/o hyperglycemia,  increasing leg edema, and chest pain, very recent admission here with DKA and NSTEMI s/p stent and discharged 8/3 now presenting with acute on chronic diastolic and systolic heart failure in setting of accelerated hypertension.  Acute on Chronic Systolic Congestive Heart Failure - continue Rx as per cardiology.   ESRD- continue HD. Nephrology follow.  HTN control  Diabetes type 1 - continue Insulin pump  PVD- stable.   CAD- s/p stents. stable.  PT  discussed withn patient  DC home if arrangements for HD in place  Follow with PMD (DR. Biswas) Nephrology Dr. Schmidt and [place] [facility] in 3-4 days..

## 2017-08-22 NOTE — DISCHARGE NOTE ADULT - PLAN OF CARE
Weigh yourself daily.  If you gain 3lbs in 3 days, or 5lbs in a week call your Health Care Provider.  Do not eat or drink foods containing more than 2000mg of salt (sodium) in your diet every day.  Call your Health Care Provider if you have any swelling or increased swelling in your feet, ankles, and/or stomach.  Take all of your medication as directed.  If you become dizzy call your Health Care Provider. HgA1C this admission 6.8.  Make sure you get your HgA1c checked every three months.  If you take oral diabetes medications, check your blood glucose two times a day.  If you take insulin, check your blood glucose before meals and at bedtime.  It's important not to skip any meals.  Keep a log of your blood glucose results and always take it with you to your doctor appointments.  Keep a list of your current medications including injectables and over the counter medications and bring this medication list with you to all your doctor appointments.  If you have not seen your opthalmologist this year call for appointment.  Check your feet daily for redness, sores, or openings. Do not self treat. If no improvement in two days call your primary care physician for an appointment.  Low blood sugar (hypoglycemia) is a blood sugar below 70mg/dl. Check your blood sugar if you feel signs/symptoms of hypoglycemia. If your blood sugar is below 70 take 15 grams of carbohydrates (ex 4 oz of apple juice, 3-4 glucosr tablets, or 4-6 oz of regular soda) wait 15 minutes and repeat blood sugar to make sure it comes up above 70.  If your blood sugar is above 70 and you are due for a meal, have a meal.  If you are not due for a meal have a snack.  This snack helps keeps your blood sugar at a safe range. Avoid taking (NSAIDs) - (ex: Ibuprofen, Advil, Celebrex, Naprosyn)  Avoid taking any nephrotoxic agents (can harm kidneys) - Intravenous contrast for diagnostic testing, combination cold medications.  Have all medications adjusted for your renal function by your Health Care Provider.  Blood pressure control is important.  Take all medication as prescribed. Continue with current medications. Follow up with Primary Care Doctor within 1 week of discharge. Stable

## 2017-08-22 NOTE — PROGRESS NOTE ADULT - PROBLEM SELECTOR PLAN 1
Pt had high sugar this morning that was caused by prandial intake without bolusing.  Advised always to bolus.  She already took CDI this AM so will trend sugars to make sure declining throughout the day.  When insulin taken as it should be, she appears to have well controlled sugars.  No change to pump settings as of now.  Biggest goal to avoid low BG especially given h/o hypoglycemic unawareness.
Pt here with acute on chronic likely combined CHF-has hx of diastolic CHF but during recent echo s/p MI with EF 34% so may have combined failure at this time.
Pt here with acute on chronic likely combined CHF-has hx of diastolic CHF but during recent echo s/p MI with EF 34% so may have combined failure at this time.
Pt here with acute on chronic likely combined CHF-has hx of diastolic CHF but during recent echo s/p MI with EF 34% so may have combined failure at this time.  Will need fluid removal more aggressively with repeat HD tomorrow especially as s/p CTA.  Will need renal consult in AM for HD (Sees Dr. Daisy Burger when inpatient).  Appears to have responded well to blood pressure control and with use of BiPAP with improvement in respiratory status so will continue to control blood pressures.  Will not use diuretics for now given poor urine production and recent contrast load.
Sugars at goal, appetite good.  No change to pump settings as of now.  Biggest goal to avoid low BG especially given h/o hypoglycemic unawareness.

## 2017-08-22 NOTE — DISCHARGE NOTE ADULT - CARE PROVIDER_API CALL
Tee Biswas), Cardiovascular Disease; Internal Medicine  [address] [address] [address]  Phone: [phone]  Fax: [phone]    Pina Ley (SUSAN), EndocrinologyMetabDiabetes  [address] [place] [address]  Phone: [phone]  Fax: [phone]

## 2017-08-22 NOTE — PROGRESS NOTE ADULT - SUBJECTIVE AND OBJECTIVE BOX
Patient is a 62y old  Female who presents with a chief complaint of SOB/lightheadedness (22 Aug 2017 12:14)      SUBJECTIVE / OVERNIGHT EVENTS: No new complaints.   Review of Systems  chest pain no  palpitations no  sob no  nausea no  headache no    MEDICATIONS  (STANDING):  oxyCODONE    5 mG/acetaminophen 325 mG 1 Tablet(s) Oral at bedtime  aspirin enteric coated 81 milliGRAM(s) Oral daily  DULoxetine 60 milliGRAM(s) Oral daily  atorvastatin 20 milliGRAM(s) Oral at bedtime  clopidogrel Tablet 75 milliGRAM(s) Oral at bedtime  metoprolol succinate  milliGRAM(s) Oral at bedtime  cinacalcet 60 milliGRAM(s) Oral daily  sevelamer hydrochloride 2400 milliGRAM(s) Oral every 8 hours  hydrALAZINE 100 milliGRAM(s) Oral three times a day  multivitamin 1 Tablet(s) Oral daily  heparin  Injectable 5000 Unit(s) SubCutaneous every 12 hours  lisinopril 40 milliGRAM(s) Oral at bedtime  dextrose 5%. 1000 milliLiter(s) (50 mL/Hr) IV Continuous <Continuous>  dextrose 50% Injectable 12.5 Gram(s) IV Push once  dextrose 50% Injectable 25 Gram(s) IV Push once  dextrose 50% Injectable 25 Gram(s) IV Push once  insulin lispro (HumaLOG) Pump 1 Each SubCutaneous Continuous Pump    MEDICATIONS  (PRN):  dextrose Gel 1 Dose(s) Oral once PRN Blood Glucose LESS THAN 70 milliGRAM(s)/deciliter  glucagon  Injectable 1 milliGRAM(s) IntraMuscular once PRN Glucose LESS THAN 70 milligrams/deciliter  acetaminophen   Tablet. 650 milliGRAM(s) Oral once PRN Moderate Pain (4 - 6)          PHYSICAL EXAM:  GENERAL: NAD, well-developed  HEAD:  Atraumatic, Normocephalic  EYES: EOMI, PERRLA, conjunctiva and sclera clear  NECK: Supple, No JVD  CHEST/LUNG: Clear to auscultation bilaterally; No wheeze  HEART: Regular rate and rhythm; No murmurs, rubs, or gallops  ABDOMEN: Soft, Nontender, Nondistended; Bowel sounds present  EXTREMITIES:  2+ Peripheral Pulses, No clubbing, cyanosis, or edema  PSYCH: AAOx3  NEUROLOGY: non-focal  SKIN: No rashes or lesions    LABS:                        9.4    4.41  )-----------( 317      ( 22 Aug 2017 07:44 )             30.4     08-22    134<L>  |  90<L>  |  15  ----------------------------<  398<H>  4.7   |  26  |  3.92<H>    Ca    7.6<L>      22 Aug 2017 07:39                RADIOLOGY & ADDITIONAL TESTS:    Imaging Personally Reviewed:    Consultant(s) Notes Reviewed:      Care Discussed with Consultants/Other Providers:

## 2017-08-22 NOTE — DISCHARGE NOTE ADULT - CARE PLAN
Principal Discharge DX:	Chronic pulmonary edema  Goal:	Stable  Instructions for follow-up, activity and diet:	Follow up with Primary Care Doctor within 1 week of discharge.  Secondary Diagnosis:	Acute on chronic combined systolic (congestive) and diastolic (congestive) heart failure  Instructions for follow-up, activity and diet:	Weigh yourself daily.  If you gain 3lbs in 3 days, or 5lbs in a week call your Health Care Provider.  Do not eat or drink foods containing more than 2000mg of salt (sodium) in your diet every day.  Call your Health Care Provider if you have any swelling or increased swelling in your feet, ankles, and/or stomach.  Take all of your medication as directed.  If you become dizzy call your Health Care Provider.  Secondary Diagnosis:	Diabetes mellitus type 1  Instructions for follow-up, activity and diet:	HgA1C this admission 6.8.  Make sure you get your HgA1c checked every three months.  If you take oral diabetes medications, check your blood glucose two times a day.  If you take insulin, check your blood glucose before meals and at bedtime.  It's important not to skip any meals.  Keep a log of your blood glucose results and always take it with you to your doctor appointments.  Keep a list of your current medications including injectables and over the counter medications and bring this medication list with you to all your doctor appointments.  If you have not seen your opthalmologist this year call for appointment.  Check your feet daily for redness, sores, or openings. Do not self treat. If no improvement in two days call your primary care physician for an appointment.  Low blood sugar (hypoglycemia) is a blood sugar below 70mg/dl. Check your blood sugar if you feel signs/symptoms of hypoglycemia. If your blood sugar is below 70 take 15 grams of carbohydrates (ex 4 oz of apple juice, 3-4 glucosr tablets, or 4-6 oz of regular soda) wait 15 minutes and repeat blood sugar to make sure it comes up above 70.  If your blood sugar is above 70 and you are due for a meal, have a meal.  If you are not due for a meal have a snack.  This snack helps keeps your blood sugar at a safe range.  Secondary Diagnosis:	ESRD (end stage renal disease) on dialysis  Instructions for follow-up, activity and diet:	Avoid taking (NSAIDs) - (ex: Ibuprofen, Advil, Celebrex, Naprosyn)  Avoid taking any nephrotoxic agents (can harm kidneys) - Intravenous contrast for diagnostic testing, combination cold medications.  Have all medications adjusted for your renal function by your Health Care Provider.  Blood pressure control is important.  Take all medication as prescribed.  Secondary Diagnosis:	Hyperlipidemia, unspecified hyperlipidemia type  Instructions for follow-up, activity and diet:	Continue with current medications.

## 2017-08-22 NOTE — PROGRESS NOTE ADULT - SUBJECTIVE AND OBJECTIVE BOX
[facility]   [phone]  RENAL FOLLOW UP NOTE  --------------------------------------------------------------------------------  Chief Complaint:    24 hour events/subjective:    States feels better.  No new specific complaints offered.  PAST HISTORY  --------------------------------------------------------------------------------  No significant changes to PMH, PSH, FHx, SHx, unless otherwise noted    ALLERGIES & MEDICATIONS  --------------------------------------------------------------------------------  Allergies    No Known Allergies    Intolerances      Standing Inpatient Medications  oxyCODONE    5 mG/acetaminophen 325 mG 1 Tablet(s) Oral at bedtime  aspirin enteric coated 81 milliGRAM(s) Oral daily  DULoxetine 60 milliGRAM(s) Oral daily  atorvastatin 20 milliGRAM(s) Oral at bedtime  clopidogrel Tablet 75 milliGRAM(s) Oral at bedtime  metoprolol succinate  milliGRAM(s) Oral at bedtime  cinacalcet 60 milliGRAM(s) Oral daily  sevelamer hydrochloride 2400 milliGRAM(s) Oral every 8 hours  hydrALAZINE 100 milliGRAM(s) Oral three times a day  multivitamin 1 Tablet(s) Oral daily  heparin  Injectable 5000 Unit(s) SubCutaneous every 12 hours  lisinopril 40 milliGRAM(s) Oral at bedtime  dextrose 5%. 1000 milliLiter(s) IV Continuous <Continuous>  dextrose 50% Injectable 12.5 Gram(s) IV Push once  dextrose 50% Injectable 25 Gram(s) IV Push once  dextrose 50% Injectable 25 Gram(s) IV Push once  insulin lispro (HumaLOG) Pump 1 Each SubCutaneous Continuous Pump    PRN Inpatient Medications  dextrose Gel 1 Dose(s) Oral once PRN  glucagon  Injectable 1 milliGRAM(s) IntraMuscular once PRN  acetaminophen   Tablet. 650 milliGRAM(s) Oral once PRN      REVIEW OF SYSTEMS  --------------------------------------------------------------------------------    Gen: denies fatigue, fevers/chills,  CVS: denies chest pain/palpitations  Resp: denies SOB/Cough  GI: Denies N/V/Abd pain      All other systems were reviewed and are negative, except as noted.    VITALS/PHYSICAL EXAM  --------------------------------------------------------------------------------  T(C): 36.8 (08-22-17 @ 04:52), Max: 36.8 (08-21-17 @ 13:05)  HR: 64 (08-22-17 @ 04:52) (62 - 70)  BP: 150/65 (08-22-17 @ 06:21) (128/64 - 166/64)  RR: 18 (08-22-17 @ 04:52) (18 - 18)  SpO2: 100% (08-22-17 @ 04:52) (98% - 100%)  Wt(kg): --        08-21-17 @ 07:01  -  08-22-17 @ 07:00  --------------------------------------------------------  IN: 200 mL / OUT: 2500 mL / NET: -2300 mL    08-22-17 @ 07:01  -  08-22-17 @ 12:59  --------------------------------------------------------  IN: 120 mL / OUT: 0 mL / NET: 120 mL      Physical Exam:  	    Physical Exam:  	Gen: alert oriented place person and date   	Pulm:Clear to auscultation no rales or ronchi or wheezing  	CV: RRR, S1/S2. no rub  	Abd: +BS, soft, nontender/nondistended  	: No suprapubic tenderness.               Extremity: No cyanosis,Trace left lower extremity edema no clubbing  	  LABS/STUDIES  --------------------------------------------------------------------------------              9.4    4.41  >-----------<  317      [08-22-17 @ 07:44]              30.4     134  |  90  |  15  ----------------------------<  398      [08-22-17 @ 07:39]  4.7   |  26  |  3.92        Ca     7.6     [08-22-17 @ 07:39]            Creatinine Trend:  SCr 3.92 [08-22 @ 07:39]  SCr 5.49 [08-21 @ 12:45]  SCr 3.37 [08-20 @ 09:33]  SCr 4.31 [08-19 @ 10:16]  SCr 4.46 [08-18 @ 08:43]                  HbA1c 6.8      [07-24-17 @ 06:15]

## 2017-08-22 NOTE — DISCHARGE NOTE ADULT - ADDITIONAL INSTRUCTIONS
Your Insulin regiment was changed in the hospital. Continue with current settings, follow up with your  Endocrinologist, Dr. Ley as an outpatient  Continue with your Dialysis schedule as an outpatient.  Follow up with your Cardiologist as an outpatient.

## 2017-08-22 NOTE — PROGRESS NOTE ADULT - PROBLEM SELECTOR PLAN 2
Likely related at least partially to volume overload.  Continue home medications-patient is still on ACE as well not noted on prior discharge paperwork.  s/p hydralazine in ED here and nitroglycerin  Will monitor and maintain SBP ~160 for now and then retarget after 24 hours.  Monitor on telemetry
Pump site last changed evening of 8/20.  No changes to settings today.
Pump site last changed evening of 8/20.  No changes to settings today.

## 2017-08-22 NOTE — DISCHARGE NOTE ADULT - MEDICATION SUMMARY - MEDICATIONS TO TAKE
I will START or STAY ON the medications listed below when I get home from the hospital:    Percocet 5/325 oral tablet  -- 1 tab(s) by mouth once a day (at bedtime)  -- Indication: For Pain    aspirin 81 mg oral delayed release tablet  -- 1 tab(s) by mouth once a day  -- Indication: For Coronary artery disease involving native coronary artery of native heart without angina pectoris    lisinopril 40 mg oral tablet  -- 1 tab(s) by mouth once a day (at bedtime)  -- Indication: For Blood Pressure    DULoxetine 60 mg oral delayed release capsule  -- 1 cap(s) by mouth once a day  -- Indication: For Anti-Depressant    insulin lispro 100 units/mL subcutaneous solution  -- 1 each subcutaneous   -- Indication: For Diabetes    atorvastatin 20 mg oral tablet  -- 1 tab(s) by mouth once a day (at bedtime)  -- Indication: For High Cholesterol    clopidogrel 75 mg oral tablet  -- 1 tab(s) by mouth once a day (at bedtime)  -- Indication: For Anti-platelet    metoprolol succinate 100 mg oral tablet, extended release  -- 1 tab(s) by mouth once a day (at bedtime)  -- Indication: For Blood Pressure    cinacalcet 60 mg oral tablet  -- 1 tab(s) by mouth once a day  -- Indication: For CKD    sevelamer hydrochloride 800 mg oral tablet  -- 3 tab(s) by mouth every 8 hours  -- Indication: For CKD    hydrALAZINE 100 mg oral tablet  -- 1 tab(s) by mouth 3 times a day  -- Indication: For Blood Pressure    Multiple Vitamins oral tablet  -- 1 tab(s) by mouth once a day  -- Indication: For Vitamin

## 2017-08-22 NOTE — PROGRESS NOTE ADULT - ASSESSMENT
1.diastolic heart failure fluid overload improved with dialysis  2. left leg pain and swelling improved with dialysis and fluid removal  3. no angina S?P multiple PTCI of the RCA  4. brittle DM  5. PVD stable      recommend:  dialysis as per renal  discharge planning  no further cardiac workup

## 2017-08-22 NOTE — DISCHARGE NOTE ADULT - MEDICATION SUMMARY - MEDICATIONS TO STOP TAKING
I will STOP taking the medications listed below when I get home from the hospital:    oxyCODONE 5 mg oral tablet  -- 1 tab(s) by mouth once a day (at bedtime) - 10)

## 2017-08-23 LAB — METHYLMALONATE SERPL-SCNC: 803 NMOL/L — HIGH (ref 0–378)

## 2017-09-16 ENCOUNTER — INPATIENT (INPATIENT)
Facility: HOSPITAL | Age: 62
LOS: 3 days | Discharge: HOME | DRG: 637 | End: 2017-09-20
Attending: INTERNAL MEDICINE | Admitting: INTERNAL MEDICINE
Payer: MEDICARE

## 2017-09-16 VITALS
DIASTOLIC BLOOD PRESSURE: 45 MMHG | SYSTOLIC BLOOD PRESSURE: 111 MMHG | OXYGEN SATURATION: 100 % | RESPIRATION RATE: 25 BRPM | HEART RATE: 72 BPM

## 2017-09-16 DIAGNOSIS — E10.10 TYPE 1 DIABETES MELLITUS WITH KETOACIDOSIS WITHOUT COMA: ICD-10-CM

## 2017-09-16 DIAGNOSIS — Z98.89 OTHER SPECIFIED POSTPROCEDURAL STATES: Chronic | ICD-10-CM

## 2017-09-16 DIAGNOSIS — I10 ESSENTIAL (PRIMARY) HYPERTENSION: ICD-10-CM

## 2017-09-16 DIAGNOSIS — E78.5 HYPERLIPIDEMIA, UNSPECIFIED: ICD-10-CM

## 2017-09-16 LAB
ACETONE SERPL-MCNC: ABNORMAL
ALBUMIN SERPL ELPH-MCNC: 4.6 G/DL — SIGNIFICANT CHANGE UP (ref 3.3–5)
ALP SERPL-CCNC: 241 U/L — HIGH (ref 40–120)
ALT FLD-CCNC: 15 U/L RC — SIGNIFICANT CHANGE UP (ref 10–45)
ANION GAP SERPL CALC-SCNC: 24 MMOL/L — HIGH (ref 5–17)
ANION GAP SERPL CALC-SCNC: 29 MMOL/L — HIGH (ref 5–17)
ANION GAP SERPL CALC-SCNC: 29 MMOL/L — HIGH (ref 5–17)
APTT BLD: 29.3 SEC — SIGNIFICANT CHANGE UP (ref 27.5–37.4)
AST SERPL-CCNC: 32 U/L — SIGNIFICANT CHANGE UP (ref 10–40)
BASE EXCESS BLDV CALC-SCNC: -5 MMOL/L — LOW (ref -2–2)
BASE EXCESS BLDV CALC-SCNC: -5 MMOL/L — LOW (ref -2–2)
BASE EXCESS BLDV CALC-SCNC: 0.2 MMOL/L — SIGNIFICANT CHANGE UP (ref -2–2)
BILIRUB SERPL-MCNC: 0.3 MG/DL — SIGNIFICANT CHANGE UP (ref 0.2–1.2)
BUN SERPL-MCNC: 13 MG/DL — SIGNIFICANT CHANGE UP (ref 7–23)
BUN SERPL-MCNC: 41 MG/DL — HIGH (ref 7–23)
BUN SERPL-MCNC: 43 MG/DL — HIGH (ref 7–23)
CA-I SERPL-SCNC: 0.94 MMOL/L — LOW (ref 1.12–1.3)
CA-I SERPL-SCNC: 1 MMOL/L — LOW (ref 1.12–1.3)
CALCIUM SERPL-MCNC: 7.6 MG/DL — LOW (ref 8.4–10.5)
CALCIUM SERPL-MCNC: 8 MG/DL — LOW (ref 8.4–10.5)
CALCIUM SERPL-MCNC: 8.1 MG/DL — LOW (ref 8.4–10.5)
CHLORIDE BLDV-SCNC: 95 MMOL/L — LOW (ref 96–108)
CHLORIDE BLDV-SCNC: 95 MMOL/L — LOW (ref 96–108)
CHLORIDE SERPL-SCNC: 88 MMOL/L — LOW (ref 96–108)
CHLORIDE SERPL-SCNC: 89 MMOL/L — LOW (ref 96–108)
CHLORIDE SERPL-SCNC: 91 MMOL/L — LOW (ref 96–108)
CK MB BLD-MCNC: 1.6 % — SIGNIFICANT CHANGE UP (ref 0–3.5)
CK MB BLD-MCNC: 1.8 % — SIGNIFICANT CHANGE UP (ref 0–3.5)
CK MB CFR SERPL CALC: 2.9 NG/ML — SIGNIFICANT CHANGE UP (ref 0–3.8)
CK MB CFR SERPL CALC: 3.8 NG/ML — SIGNIFICANT CHANGE UP (ref 0–3.8)
CK SERPL-CCNC: 165 U/L — SIGNIFICANT CHANGE UP (ref 25–170)
CK SERPL-CCNC: 243 U/L — HIGH (ref 25–170)
CO2 BLDV-SCNC: 22 MMOL/L — SIGNIFICANT CHANGE UP (ref 22–30)
CO2 BLDV-SCNC: 23 MMOL/L — SIGNIFICANT CHANGE UP (ref 22–30)
CO2 BLDV-SCNC: 26 MMOL/L — SIGNIFICANT CHANGE UP (ref 22–30)
CO2 SERPL-SCNC: 18 MMOL/L — LOW (ref 22–31)
CO2 SERPL-SCNC: 19 MMOL/L — LOW (ref 22–31)
CO2 SERPL-SCNC: 21 MMOL/L — LOW (ref 22–31)
CREAT SERPL-MCNC: 2.84 MG/DL — HIGH (ref 0.5–1.3)
CREAT SERPL-MCNC: 6.6 MG/DL — HIGH (ref 0.5–1.3)
CREAT SERPL-MCNC: 6.92 MG/DL — HIGH (ref 0.5–1.3)
GAS PNL BLDV: 132 MMOL/L — LOW (ref 136–145)
GAS PNL BLDV: 134 MMOL/L — LOW (ref 136–145)
GAS PNL BLDV: SIGNIFICANT CHANGE UP
GAS PNL BLDV: SIGNIFICANT CHANGE UP
GLUCOSE BLDV-MCNC: 391 MG/DL — HIGH (ref 70–99)
GLUCOSE BLDV-MCNC: 393 MG/DL — HIGH (ref 70–99)
GLUCOSE SERPL-MCNC: 261 MG/DL — HIGH (ref 70–99)
GLUCOSE SERPL-MCNC: 394 MG/DL — HIGH (ref 70–99)
GLUCOSE SERPL-MCNC: 418 MG/DL — HIGH (ref 70–99)
HCO3 BLDV-SCNC: 21 MMOL/L — SIGNIFICANT CHANGE UP (ref 21–29)
HCO3 BLDV-SCNC: 21 MMOL/L — SIGNIFICANT CHANGE UP (ref 21–29)
HCO3 BLDV-SCNC: 25 MMOL/L — SIGNIFICANT CHANGE UP (ref 21–29)
HCT VFR BLD CALC: 33.5 % — LOW (ref 34.5–45)
HCT VFR BLDA CALC: 28 % — LOW (ref 39–50)
HCT VFR BLDA CALC: 32 % — LOW (ref 39–50)
HGB BLD CALC-MCNC: 10.4 G/DL — LOW (ref 11.5–15.5)
HGB BLD CALC-MCNC: 9.2 G/DL — LOW (ref 11.5–15.5)
HGB BLD-MCNC: 10.8 G/DL — LOW (ref 11.5–15.5)
HOROWITZ INDEX BLDV+IHG-RTO: 36 — SIGNIFICANT CHANGE UP
INR BLD: 0.95 RATIO — SIGNIFICANT CHANGE UP (ref 0.88–1.16)
LACTATE BLDV-MCNC: 1.9 MMOL/L — SIGNIFICANT CHANGE UP (ref 0.7–2)
LACTATE BLDV-MCNC: 3.1 MMOL/L — HIGH (ref 0.7–2)
LACTATE BLDV-MCNC: 3.8 MMOL/L — HIGH (ref 0.7–2)
LIDOCAIN IGE QN: 71 U/L — HIGH (ref 7–60)
MAGNESIUM SERPL-MCNC: 2 MG/DL — SIGNIFICANT CHANGE UP (ref 1.6–2.6)
MAGNESIUM SERPL-MCNC: 2.2 MG/DL — SIGNIFICANT CHANGE UP (ref 1.6–2.6)
MCHC RBC-ENTMCNC: 32.1 GM/DL — SIGNIFICANT CHANGE UP (ref 32–36)
MCHC RBC-ENTMCNC: 34 PG — SIGNIFICANT CHANGE UP (ref 27–34)
MCV RBC AUTO: 106 FL — HIGH (ref 80–100)
OTHER CELLS CSF MANUAL: 6 ML/DL — LOW (ref 18–22)
OTHER CELLS CSF MANUAL: 8 ML/DL — LOW (ref 18–22)
PCO2 BLDV: 45 MMHG — SIGNIFICANT CHANGE UP (ref 35–50)
PCO2 BLDV: 46 MMHG — SIGNIFICANT CHANGE UP (ref 35–50)
PCO2 BLDV: 49 MMHG — SIGNIFICANT CHANGE UP (ref 35–50)
PH BLDV: 7.26 — LOW (ref 7.35–7.45)
PH BLDV: 7.28 — LOW (ref 7.35–7.45)
PH BLDV: 7.37 — SIGNIFICANT CHANGE UP (ref 7.35–7.45)
PHOSPHATE SERPL-MCNC: 3.2 MG/DL — SIGNIFICANT CHANGE UP (ref 2.5–4.5)
PHOSPHATE SERPL-MCNC: 4.1 MG/DL — SIGNIFICANT CHANGE UP (ref 2.5–4.5)
PLATELET # BLD AUTO: 283 K/UL — SIGNIFICANT CHANGE UP (ref 150–400)
PO2 BLDV: 30 MMHG — SIGNIFICANT CHANGE UP (ref 25–45)
PO2 BLDV: 40 MMHG — SIGNIFICANT CHANGE UP (ref 25–45)
PO2 BLDV: 45 MMHG — SIGNIFICANT CHANGE UP (ref 25–45)
POTASSIUM BLDV-SCNC: 4.4 MMOL/L — SIGNIFICANT CHANGE UP (ref 3.5–5)
POTASSIUM BLDV-SCNC: 7.6 MMOL/L — CRITICAL HIGH (ref 3.5–5)
POTASSIUM SERPL-MCNC: 4 MMOL/L — SIGNIFICANT CHANGE UP (ref 3.5–5.3)
POTASSIUM SERPL-MCNC: 4.6 MMOL/L — SIGNIFICANT CHANGE UP (ref 3.5–5.3)
POTASSIUM SERPL-MCNC: 6.1 MMOL/L — HIGH (ref 3.5–5.3)
POTASSIUM SERPL-SCNC: 4 MMOL/L — SIGNIFICANT CHANGE UP (ref 3.5–5.3)
POTASSIUM SERPL-SCNC: 4.6 MMOL/L — SIGNIFICANT CHANGE UP (ref 3.5–5.3)
POTASSIUM SERPL-SCNC: 6.1 MMOL/L — HIGH (ref 3.5–5.3)
PROT SERPL-MCNC: 7.8 G/DL — SIGNIFICANT CHANGE UP (ref 6–8.3)
PROTHROM AB SERPL-ACNC: 10.2 SEC — SIGNIFICANT CHANGE UP (ref 9.8–12.7)
RBC # BLD: 3.17 M/UL — LOW (ref 3.8–5.2)
RBC # FLD: 13.2 % — SIGNIFICANT CHANGE UP (ref 10.3–14.5)
SAO2 % BLDV: 41 % — LOW (ref 67–88)
SAO2 % BLDV: 66 % — LOW (ref 67–88)
SAO2 % BLDV: 68 % — SIGNIFICANT CHANGE UP (ref 67–88)
SODIUM SERPL-SCNC: 135 MMOL/L — SIGNIFICANT CHANGE UP (ref 135–145)
SODIUM SERPL-SCNC: 136 MMOL/L — SIGNIFICANT CHANGE UP (ref 135–145)
SODIUM SERPL-SCNC: 137 MMOL/L — SIGNIFICANT CHANGE UP (ref 135–145)
TROPONIN T SERPL-MCNC: 0.1 NG/ML — HIGH (ref 0–0.06)
TROPONIN T SERPL-MCNC: 0.1 NG/ML — HIGH (ref 0–0.06)
WBC # BLD: 8.3 K/UL — SIGNIFICANT CHANGE UP (ref 3.8–10.5)
WBC # FLD AUTO: 8.3 K/UL — SIGNIFICANT CHANGE UP (ref 3.8–10.5)

## 2017-09-16 PROCEDURE — 99291 CRITICAL CARE FIRST HOUR: CPT

## 2017-09-16 PROCEDURE — 93971 EXTREMITY STUDY: CPT | Mod: 26

## 2017-09-16 PROCEDURE — 71275 CT ANGIOGRAPHY CHEST: CPT | Mod: 26

## 2017-09-16 PROCEDURE — 71010: CPT | Mod: 26

## 2017-09-16 PROCEDURE — 99291 CRITICAL CARE FIRST HOUR: CPT | Mod: GC

## 2017-09-16 PROCEDURE — 74176 CT ABD & PELVIS W/O CONTRAST: CPT | Mod: 26

## 2017-09-16 PROCEDURE — 99223 1ST HOSP IP/OBS HIGH 75: CPT

## 2017-09-16 RX ORDER — INSULIN HUMAN 100 [IU]/ML
5 INJECTION, SOLUTION SUBCUTANEOUS ONCE
Qty: 0 | Refills: 0 | Status: COMPLETED | OUTPATIENT
Start: 2017-09-16 | End: 2017-09-16

## 2017-09-16 RX ORDER — INFLUENZA VIRUS VACCINE 15; 15; 15; 15 UG/.5ML; UG/.5ML; UG/.5ML; UG/.5ML
0.5 SUSPENSION INTRAMUSCULAR ONCE
Qty: 0 | Refills: 0 | Status: DISCONTINUED | OUTPATIENT
Start: 2017-09-16 | End: 2017-09-20

## 2017-09-16 RX ORDER — DEXTROSE 50 % IN WATER 50 %
25 SYRINGE (ML) INTRAVENOUS ONCE
Qty: 0 | Refills: 0 | Status: COMPLETED | OUTPATIENT
Start: 2017-09-16 | End: 2017-09-16

## 2017-09-16 RX ORDER — DEXTROSE 10 % IN WATER 10 %
1000 INTRAVENOUS SOLUTION INTRAVENOUS
Qty: 0 | Refills: 0 | Status: DISCONTINUED | OUTPATIENT
Start: 2017-09-16 | End: 2017-09-17

## 2017-09-16 RX ORDER — METOPROLOL TARTRATE 50 MG
5 TABLET ORAL
Qty: 0 | Refills: 0 | COMMUNITY

## 2017-09-16 RX ORDER — CLOPIDOGREL BISULFATE 75 MG/1
75 TABLET, FILM COATED ORAL AT BEDTIME
Qty: 0 | Refills: 0 | Status: DISCONTINUED | OUTPATIENT
Start: 2017-09-16 | End: 2017-09-20

## 2017-09-16 RX ORDER — INSULIN LISPRO 100/ML
8 VIAL (ML) SUBCUTANEOUS ONCE
Qty: 0 | Refills: 0 | Status: DISCONTINUED | OUTPATIENT
Start: 2017-09-16 | End: 2017-09-16

## 2017-09-16 RX ORDER — ATORVASTATIN CALCIUM 80 MG/1
20 TABLET, FILM COATED ORAL AT BEDTIME
Qty: 0 | Refills: 0 | Status: DISCONTINUED | OUTPATIENT
Start: 2017-09-16 | End: 2017-09-20

## 2017-09-16 RX ORDER — INSULIN HUMAN 100 [IU]/ML
2 INJECTION, SOLUTION SUBCUTANEOUS
Qty: 100 | Refills: 0 | Status: DISCONTINUED | OUTPATIENT
Start: 2017-09-16 | End: 2017-09-17

## 2017-09-16 RX ORDER — FENTANYL CITRATE 50 UG/ML
12.5 INJECTION INTRAVENOUS ONCE
Qty: 0 | Refills: 0 | Status: DISCONTINUED | OUTPATIENT
Start: 2017-09-16 | End: 2017-09-16

## 2017-09-16 RX ORDER — ALBUTEROL 90 UG/1
10 AEROSOL, METERED ORAL ONCE
Qty: 0 | Refills: 0 | Status: COMPLETED | OUTPATIENT
Start: 2017-09-16 | End: 2017-09-16

## 2017-09-16 RX ORDER — DEXTROSE 50 % IN WATER 50 %
25 SYRINGE (ML) INTRAVENOUS ONCE
Qty: 0 | Refills: 0 | Status: DISCONTINUED | OUTPATIENT
Start: 2017-09-16 | End: 2017-09-17

## 2017-09-16 RX ORDER — HYDROMORPHONE HYDROCHLORIDE 2 MG/ML
1 INJECTION INTRAMUSCULAR; INTRAVENOUS; SUBCUTANEOUS ONCE
Qty: 0 | Refills: 0 | Status: DISCONTINUED | OUTPATIENT
Start: 2017-09-16 | End: 2017-09-16

## 2017-09-16 RX ORDER — ONDANSETRON 8 MG/1
4 TABLET, FILM COATED ORAL ONCE
Qty: 0 | Refills: 0 | Status: COMPLETED | OUTPATIENT
Start: 2017-09-16 | End: 2017-09-16

## 2017-09-16 RX ORDER — ASPIRIN/CALCIUM CARB/MAGNESIUM 324 MG
81 TABLET ORAL DAILY
Qty: 0 | Refills: 0 | Status: DISCONTINUED | OUTPATIENT
Start: 2017-09-16 | End: 2017-09-20

## 2017-09-16 RX ORDER — HEPARIN SODIUM 5000 [USP'U]/ML
5000 INJECTION INTRAVENOUS; SUBCUTANEOUS EVERY 12 HOURS
Qty: 0 | Refills: 0 | Status: DISCONTINUED | OUTPATIENT
Start: 2017-09-16 | End: 2017-09-20

## 2017-09-16 RX ADMIN — INSULIN HUMAN 6 UNIT(S)/HR: 100 INJECTION, SOLUTION SUBCUTANEOUS at 12:33

## 2017-09-16 RX ADMIN — INSULIN HUMAN 5 UNIT(S): 100 INJECTION, SOLUTION SUBCUTANEOUS at 10:05

## 2017-09-16 RX ADMIN — ONDANSETRON 4 MILLIGRAM(S): 8 TABLET, FILM COATED ORAL at 09:44

## 2017-09-16 RX ADMIN — FENTANYL CITRATE 12.5 MICROGRAM(S): 50 INJECTION INTRAVENOUS at 22:15

## 2017-09-16 RX ADMIN — HYDROMORPHONE HYDROCHLORIDE 1 MILLIGRAM(S): 2 INJECTION INTRAMUSCULAR; INTRAVENOUS; SUBCUTANEOUS at 23:20

## 2017-09-16 RX ADMIN — Medication 81 MILLIGRAM(S): at 09:44

## 2017-09-16 RX ADMIN — FENTANYL CITRATE 12.5 MICROGRAM(S): 50 INJECTION INTRAVENOUS at 22:00

## 2017-09-16 RX ADMIN — FENTANYL CITRATE 12.5 MICROGRAM(S): 50 INJECTION INTRAVENOUS at 21:30

## 2017-09-16 RX ADMIN — Medication 25 MILLILITER(S): at 18:16

## 2017-09-16 RX ADMIN — ALBUTEROL 2.5 MILLIGRAM(S): 90 AEROSOL, METERED ORAL at 10:06

## 2017-09-16 RX ADMIN — FENTANYL CITRATE 12.5 MICROGRAM(S): 50 INJECTION INTRAVENOUS at 21:16

## 2017-09-16 RX ADMIN — HYDROMORPHONE HYDROCHLORIDE 1 MILLIGRAM(S): 2 INJECTION INTRAMUSCULAR; INTRAVENOUS; SUBCUTANEOUS at 23:02

## 2017-09-16 NOTE — CONSULT NOTE ADULT - ASSESSMENT
62 f with   DKA- insulin, endocrine evaluation, ICU care  ESRD- continue HD, nephrology evaluation Dr. Schmidt  chest pain-patient with known CAD, stents. Known to Dr. Vela. Cardiac enzymes.  Anxiety control  Further action as per clinical course   Pierce Viera MD pager [phone]

## 2017-09-16 NOTE — ED PROVIDER NOTE - NOTES
saw pt in ED and discussed case w/ me spoke with endo fellow. recommend insulin gtt. for insulin gtt.

## 2017-09-16 NOTE — CONSULT NOTE ADULT - ASSESSMENT
63 y/o F w/ Type 1 DM with ESRD on HD, neuropathy, retinopathy, macrovascular complications on a medtronic insulin pump with HTN, HLD a/w chest pain and DKA (high risk patient with high level decision-making).

## 2017-09-16 NOTE — H&P ADULT - NSHPSOCIALHISTORY_GEN_ALL_CORE
Lives with .  Denies alcohol or drug use.  Former smoker- quit 17 years ago, smoked 1ppd x 30 years Lives with .  Denies alcohol or drug use.  Former smoker- quit 17 years ago, smoked 1ppd x 30 years  ..

## 2017-09-16 NOTE — ED PROVIDER NOTE - ATTENDING CONTRIBUTION TO CARE
62yoF with multiple comorbidity, on HD for ESRD, recent MI s/p PCI presents to ED with sudden onset exertional CP which started as she was walking to her car on the way to HD. did not get her HD today. radiating to the back, sob initially. + nausea with vomiting in ED.   On exam, CP reproducible. lungs clear. abdomen diffuse mild ttp.     Finger stick elevated. SBP ~ 100. no fever. pulse ox 100% on NC.     Labs notable for DKA - metabolic acidosis with markedly increased AG, low bicarb, elevate glucose. endo consulted and started on insulin gtt. MICU consulted for insulin gtt. will hold fluids as not dry appearing and insulin mainstay of therapy in HD pts in DKA.     Lactic acid elevated. no source of infection at this time. cxr clear. will attempt to get urine as pt does produce urine. will get CTAP r/o intraabdominal process. will not give 30cc/kg fluid bolus as pt ESRD and does not appear dry. will do more harm than good.     Chest pain w/ sob and LLE edema. will r/o PE in light of recent hospitalization. CTA chest. EKG in ambulance concerning but improved after treatment for hyperK. repeat serial EKGs less concerning. Trop 0.1 likely from ESRD. needs trending.  Duplex LLE in light of asymmetric edema.     HyperK - treated initially with albuterol and IV insulin. should improve with DKA treatment. will repeat. Initial hyperacute T waves improved.

## 2017-09-16 NOTE — H&P ADULT - HISTORY OF PRESENT ILLNESS
62Fw/ PMHx T1DM on insulin pump, HTN, HLD, former smoker, MI, CAD s/p PCI to RCA and brachytherapy in Aug 2017, dCHF (last TTE- July 2017- mild MR, mild AS, mild-mod TR EF65%) chronic LLE pain and edema in setting of severe PVD s/p L & R fem-pop bypass  graft,  multiple vascular surgery both leg w/ fem-pop bypass revisions , Subclavian vein stenosis left s/p stent, ESRD on HD (Tu/Thr/Sat) via L AVF with oliguria, CVA with memory deficit, depression, chronic pain management for LLE on oxycodone, multiple prior admissions to hospital for hyperglycemia,  who presented to ED today with complaint of acute onset CP with SOB. Pt had been at baseline state of health day prior to admission. On day of presentation, she had acute episode of CP similar to that experienced during her prior MI, with radiation to the back, and  acute SOB while ambulating to the car. Symptoms were associated with lightheadedness, and blurry vision. SHe denied any fevers, chills, cough, or dysuria.  Pt's sxs improved with lying supine after taking baby aspirin, and additional 2 baby aspirin provided by EMS. Upon arriving at the ED, pt experienced nausea and nonbloody emesis. Pt uses insulin pump, which she reports had been working without problems. Her fingerstick BS had been 180 earlier today.     In ED VS HR 72, /45, RR 25, SpO2 100% on 2LNC  In ED, patient was given aspirin 81mg PO, regular insulin 5 units IV, zofran 4mg IVP x1, albuterol neb x1, and started on insulin gtt at 6units/hr. Pt's repeat fingerstick BS of 447.

## 2017-09-16 NOTE — ED ADULT NURSE REASSESSMENT NOTE - NS ED NURSE REASSESS COMMENT FT1
patient is resting in the room waiting for micu consult. patient c/o of being thirsty but her chest pain has improved. patient is still sob although improved. vss/nad. will continue to monitor.

## 2017-09-16 NOTE — H&P ADULT - NSHPLABSRESULTS_GEN_ALL_CORE
LABS:                        10.8   8.3   )-----------( 283      ( 16 Sep 2017 10:00 )             33.5     Hgb Trend: 10.8<--  09-16    137  |  89<L>  |  43<H>  ----------------------------<  418<H>  4.6   |  19<L>  |  6.92<H>    Ca    7.6<L>      16 Sep 2017 13:48  Phos  4.1     09-16  Mg     2.2     09-16    TPro  7.8  /  Alb  4.6  /  TBili  0.3  /  DBili  x   /  AST  32  /  ALT  15  /  AlkPhos  241<H>  09-16    Creatinine Trend: 6.92<--, 6.60<--, 3.92<--, 5.49<--, 3.37<--, 4.31<--  PT/INR - ( 16 Sep 2017 10:00 )   PT: 10.2 sec;   INR: 0.95 ratio         PTT - ( 16 Sep 2017 10:00 )  PTT:29.3 sec      Venous Blood Gas:  09-16 @ 13:48  7.26/49/45/21/66  VBG Lactate: 3.1  Venous Blood Gas:  09-16 @ 10:00  7.28/46/30/21/41  VBG Lactate: 3.8    Labs notable for normal WBC. baseline mild anemia. Coags WNL. BMP notable for elevated serum K of 6.1. SCr elevated, consistent with known hx of ESRD. Anion gap elevated at 29. Serum glucose elevated at 394. Serum positive for small acetone. Troponin at 0.1 with CKMB of 3.8, which appears at baseline elevated in setting of ESRD.  VBG notable for pH of 7.28 - pt has notable anion gap metabolic acidosis.       No evidence of LLE DVT  CXR- L subcalvian vascular stent and AST closure device seen. Stable mild cardiomegaly. Interval improvement in pulmonary vascular congestive changes. No bilateral focal infiltrates. No pleural effusion or pneumothorax.   CTPA: no main, lobar or segmental PE. Emphysema. Bilateral upper lobe GGO noted. R upper lobe nodule 5mm, unchanged since 6/7/2016. New L upper lobe nodule measuring 8mm  CT A/P: atrophic kidneys, calcified granulomas in spleen, R external iliac stent, atherosclerosis of aorta and branches, partially imaged bilateral femoral grafts. 2x1.5cm lesion at paraceliac location, may represent lymphangioma (stable compared to 2013)    EKG: HR 75, NSR, peaked T waves ,TWI in V2, LAD , no Q waves  repeat EKG- HR 75 NSR, normal T waves, QTc 5510

## 2017-09-16 NOTE — CONSULT NOTE ADULT - PROBLEM SELECTOR RECOMMENDATION 9
Diabetes Education and Nutrition Eval  c/w insulin drip for now  Once improving can transition back to pump  Goal glucose 100-200 given brittle diabetes  Outpt. endo follow-up w/ Dr. Ley  Outpt. optho, podiatry, nephrology  Discharge home likely on her pump.

## 2017-09-16 NOTE — H&P ADULT - ASSESSMENT
62Fw/ PMHx T1DM on insulin pump, HTN, HLD, former smoker, MI, CAD s/p PCI to RCA and brachytherapy in Aug 2017, dCHF (last TTE- July 2017- mild MR, mild AS, mild-mod TR EF65%) chronic LLE pain and edema in setting of severe PVD s/p L & R fem-pop bypass  graft,  multiple vascular surgery both leg w/ fem-pop bypass revisions , Subclavian vein stenosis left s/p stent, ESRD on HD (Tu/Thr/Sat) via L AVF with oliguria, CVA with memory deficit, depression, chronic pain management for LLE on oxycodone, multiple prior admissions to hospital for hyperglycemia,  who presented to ED today with complaint of acute onset CP with SOB, found to have hyperkalemia, DKA and dynamic T wave changes.     Neuro: at baseline mental status    Pulmonary: has mild Kusmaul respiration, will treat underlying cause, DKA    Cardiac: dynamic T wave changes, will continue cardiac moitorng    Neuro: at baseline mental status    Pulmonary: has mild Kussmaul respiration, will correct underlying cause    Cardiac: dynamic T wave changes concerning, especially in light of recent PCI and stent placement  - will c/w aspirin and plavix, statin, home meds    GI: NPO for now    Renal 62Fw/ PMHx T1DM on insulin pump, HTN, HLD, former smoker, MI, CAD s/p PCI to RCA and brachytherapy in Aug 2017, dCHF (last TTE- July 2017- mild MR, mild AS, mild-mod TR EF65%) chronic LLE pain and edema in setting of severe PVD s/p L & R fem-pop bypass  graft,  multiple vascular surgery both leg w/ fem-pop bypass revisions , Subclavian vein stenosis left s/p stent, ESRD on HD (Tu/Thr/Sat) via L AVF with oliguria, CVA with memory deficit, depression, chronic pain management for LLE on oxycodone, multiple prior admissions to hospital for hyperglycemia,  who presented to ED today with complaint of acute onset CP with SOB, found to have hyperkalemia, DKA and dynamic T wave changes.     Neuro: at baseline mental status    Pulmonary: has mild Kussmaul respiration, will treat underlying cause, DKA  No evidence of PE, or acute primary pulmonary pathology on CTPA     Cardiac: hemodynamically WNL at this time  - dynamic T wave changes, will continue cardiac monitoring  - no evidence of aortic dissection  - will continue home aspirin, plavix, atorvastatin   - BP currently WNL, thus will hold oral hydralazine,   - will hold oral lisinopril for now    GI: maintain NPO for now, except for medications    Renal:   - nephrology team recommendations appreciated; pt will require hemodialysis today   - appears euvolemic at this time, will not give additional IVF  - last BMP found persistent anion-gap metabolic acidosis persistently at 29, with HCO3 now at 19, and lactate is downtrending to 3.1   No clear underlying etiology for DKA  - hold home sevelamer and cinacalcet for now    Endocrine:  - will continue insulin gtt at 6u/hr  - repeat BMP, serum acetone and VBG q6 hrs   - has insulin pump- will consult house endocrinology team    MSK: hold home percocet for now    Rosa Bravo MD, pager [phone] 62Fw/ PMHx T1DM on insulin pump, HTN, HLD, former smoker, MI, CAD s/p PCI to RCA and brachytherapy in Aug 2017, dCHF (last TTE- July 2017- mild MR, mild AS, mild-mod TR EF65%) chronic LLE pain and edema in setting of severe PVD s/p L & R fem-pop bypass  graft,  multiple vascular surgery both leg w/ fem-pop bypass revisions , Subclavian vein stenosis left s/p stent, ESRD on HD (Tu/Thr/Sat) via L AVF with oliguria, CVA with memory deficit, depression, chronic pain management for LLE on oxycodone, multiple prior admissions to hospital for hyperglycemia,  who presented to ED today with complaint of acute onset CP with SOB, found to have hyperkalemia, DKA and dynamic T wave changes.     Neuro: at baseline mental status    Pulmonary: has mild Kussmaul respiration, will treat underlying cause, DKA  No evidence of PE, or acute primary pulmonary pathology on CTPA     Cardiac: hemodynamically WNL at this time  - dynamic T wave changes, will continue cardiac monitoring  - no evidence of aortic dissection  - will continue home aspirin, plavix, atorvastatin   - BP currently WNL, thus will hold oral hydralazine,   - will hold oral lisinopril for now  - cardiac enzymes currently appear to be at baseline; will repeat EKG and trend cardiac enzymes    GI: maintain NPO for now, except for medications    Renal:   - nephrology team recommendations appreciated; pt will require hemodialysis today   - appears euvolemic at this time, will not give additional IVF  - last BMP found persistent anion-gap metabolic acidosis persistently at 29, with HCO3 now at 19, and lactate is downtrending to 3.1   No clear underlying etiology for DKA  - hold home sevelamer and cinacalcet for now    Endocrine:  - will continue insulin gtt at 6u/hr  - repeat BMP, serum acetone and VBG q6 hrs   - has insulin pump- will consult house endocrinology team    MSK: hold home percocet for now    Rosa Bravo MD, pager [phone] 62Fw/ PMHx T1DM on insulin pump, HTN, HLD, former smoker, MI, CAD s/p PCI to RCA and brachytherapy in Aug 2017, dCHF (last TTE- July 2017- mild MR, mild AS, mild-mod TR EF65%) chronic LLE pain and edema in setting of severe PVD s/p L & R fem-pop bypass  graft,  multiple vascular surgery both leg w/ fem-pop bypass revisions , Subclavian vein stenosis left s/p stent, ESRD on HD (Tu/Thr/Sat) via L AVF with oliguria, CVA with memory deficit, depression, chronic pain management for LLE on oxycodone, multiple prior admissions to hospital for hyperglycemia,  who presented to ED today with complaint of acute onset CP with SOB, found to have hyperkalemia, DKA and dynamic T wave changes.     Neuro: at baseline mental status    Endocrine: DKA  - will continue insulin gtt   - repeat BMP, serum acetone and VBG q4 hrs   - has insulin pump- will consult house endocrinology team    Pulmonary: has mild Kussmaul respiration, will treat underlying cause, DKA  No evidence of PE, or acute primary pulmonary pathology on CTPA     Cardiac: hemodynamically WNL at this time  - T wave changes sec to hyperkalemia resolved, will continue cardiac monitoring  - no evidence of aortic dissection or PE  - will continue home aspirin, plavix, atorvastatin   - BP currently WNL, thus will hold oral hydralazine,   - will hold oral lisinopril for now  - cardiac enzymes currently appear to be at baseline; will repeat EKG and trend cardiac enzymes    GI: maintain NPO for now, except for medications    Renal:   - nephrology team recommendations appreciated; pt will require hemodialysis today   - appears euvolemic at this time, will not give additional IVF  - last BMP found persistent anion-gap metabolic acidosis persistently at 29 likely component of renal failure with DKA+ LACTIC ACID), with HCO3 now at 19, and lactate is downtrending to 3.1   No clear underlying etiology for DKA  - hold home sevelamer and cinacalcet for now          Rosa Bravo MD, pager [phone]

## 2017-09-16 NOTE — ED PROCEDURE NOTE - PROCEDURE ADDITIONAL DETAILS
Emergency Department Focused Ultrasound performed at patient's bedside for educational purposes. The study will have a follow up study performed or was performed in the direct supervision of an ultrasound trained attending.     RV dilation. No gross focal wall abnormalities. Trace R pleural effusion.

## 2017-09-16 NOTE — ED PROVIDER NOTE - MEDICAL DECISION MAKING DETAILS
62F with extensive cardiac hx p/w CP. EKG with peaked T waves vs hyperacute T waves. Concern for ACS, hyperkalemia, PE. Aortic dissection much less likely - pt had CP radiating to the back with prior MI. Plan: ekg, cxr, labs, cardiacs, LE duplex, CTPA, aspirin, admit.

## 2017-09-16 NOTE — CONSULT NOTE ADULT - SUBJECTIVE AND OBJECTIVE BOX
[facility]  [phone]  NEPHROLOGY      INITIAL CONSULT NOTE  --------------------------------------------------------------------------------  HPI:  63 YO F with PAD CAD diabetic complications ESRD chf with presentation with cp and sob. in er k 6.1. renal consult called      PAST HISTORY  --------------------------------------------------------------------------------  PAST MEDICAL & SURGICAL HISTORY:  Subclavian vein stenosis, left: s/p stent  Cellulitis: 2015 left foot  DKA, type 1: 1/2015  ACS (acute coronary syndrome): 1/2015 - cath revealed 100% ostial stenosis not amenable to PCI - medical management  MI, old  TIA (transient ischemic attack): x 2 - 8-9 years ago prior to ASD/VSD repair  CAD (coronary artery disease): s/p stents  Murmur, cardiac  Gout: past  CVA (cerebral infarction): with no residual, 8 yrs ago, prior to heart surgery - ST memory loss  Peripheral vascular disease: occluded left fem-pop bypass 5/2015  Diabetes mellitus type 1: Insulin Dependent - Medtronic  Minimed Paradigm Insulin Pump - Novolog  ESRD (end stage renal disease): dialysis , tue, thursday, saturday  Hyperlipidemia  Status post device closure of ASD: &quot;clamshell&quot;  History of cardiac catheterization: 1/2015 - no intervention  S/P femoral-popliteal bypass surgery: L and R in 2013 with graft; 5/2015 CFA angioplasty left and ileofemoral endarterectomywith vein patch angioplasty of left fem-pop bypass graft  Multiple vascular surgery both leg, left fempop bypass revision 11/2015  AV (arteriovenous fistula): Left AV graft; revision with stent placement 2-3 years ago  S/P cholecystectomy    FAMILY HISTORY:  Family history of smoking  Family history of hypertension  Family history of cancer (Sibling)    PAST SOCIAL HISTORY:  past tobacco     ALLERGIES & MEDICATIONS  --------------------------------------------------------------------------------  Allergies    No Known Allergies    Intolerances      Standing Inpatient Medications  aspirin enteric coated 81 milliGRAM(s) Oral daily  insulin Infusion 6 Unit(s)/Hr IV Continuous <Continuous>    PRN Inpatient Medications      REVIEW OF SYSTEMS  --------------------------------------------------------------------------------  Gen: No fatigue, fevers/chills,  Skin:   Head/Eyes/Ears/Mouth: No headache; Normal hearing; + decrease  vision No sinus pain/discomfort, sore throat  Respiratory: + sob. + cough, wheezing,  CV: No chest pain, PND, orthopnea  GI: + epigastric abdominal pain, no diarrhea, no constipation, + nausea, no vomiting, no  melena,   : No dysuria, + anuria  MSK: + LLEpain/swelling; no back pain;   Neuro: +  dizziness/lightheadedness,-, seizures,   Heme: No easy bruising or bleeding  All other systems were reviewed and are negative, except as noted.    VITALS/PHYSICAL EXAM  --------------------------------------------------------------------------------  T(C): --  HR: 72 (09-16-17 @ 09:40) (72 - 72)  BP: 111/45 (09-16-17 @ 09:40) (111/45 - 111/45)  RR: 25 (09-16-17 @ 09:40) (25 - 25)  SpO2: 100% (09-16-17 @ 09:40) (100% - 100%)  Wt(kg): --        Physical Exam:  	Gen:appears uncomfortable with pain leg and abd  	no jvd supple neck,   	Pulm: decrease bs no rales or ronchi  	CV: RRR, S1S2; no rub  	Abd: +BS, soft, + epigastric area tender/  	: No suprapubic tenderness  	LE: Warm, no clubbing, 1++ LLE edema  	Skin: Warm, without rashes  	Vascular access: + AVF + bruit and thrill    LABS/STUDIES  --------------------------------------------------------------------------------              10.8   8.3   >-----------<  283      [09-16-17 @ 10:00]              33.5     135  |  88  |  41  ----------------------------<  394      [09-16-17 @ 10:00]  6.1   |  18  |  6.60        Ca     8.1     [09-16-17 @ 10:00]      Mg     2.2     [09-16-17 @ 10:00]      Phos  4.1     [09-16-17 @ 10:00]    TPro  7.8  /  Alb  4.6  /  TBili  0.3  /  DBili  x   /  AST  32  /  ALT  15  /  AlkPhos  241  [09-16-17 @ 10:00]    PT/INR: PT 10.2 , INR 0.95       [09-16-17 @ 10:00]  PTT: 29.3       [09-16-17 @ 10:00]    Troponin 0.10      [09-16-17 @ 10:00]        [09-16-17 @ 10:00]    Creatinine Trend:  SCr 6.60 [09-16 @ 10:00]  SCr 3.92 [08-22 @ 07:39]  SCr 5.49 [08-21 @ 12:45]  SCr 3.37 [08-20 @ 09:33]  SCr 4.31 [08-19 @ 10:16]    Urinalysis - [06-04-17 @ 08:24]      Color Yellow / Appearance Clear / SG 1.013 / pH 8.5      Gluc 1000 / Ketone Negative  / Bili Negative / Urobili Negative       Blood Negative / Protein >600 / Leuk Est Small / Nitrite Negative      RBC 3-5 / WBC 6-10 / Hyaline  / Gran  / Sq Epi  / Non Sq Epi OCC / Bacteria       HbA1c 6.8      [07-24-17 @ 06:15]    HBsAb <3.0      [08-19-17 @ 04:24]  HBsAb Nonreact      [05-02-15 @ 15:35]  HBsAg Nonreact      [05-10-17 @ 20:00]  HBcAb Nonreact      [08-19-17 @ 04:24]  HCV 0.16, Nonreact      [08-19-17 @ 04:24]

## 2017-09-16 NOTE — CONSULT NOTE ADULT - ASSESSMENT
62 years old female multiple hospital admissions with coronary artery disease as well as congestive heart failure, peripheral vascular disease, diabetic ketoacidosis and hypertension among others as listed above.  Patient is now with hyperkalemia and abd pain along with mild recurrent DKA as well   1- esrd  2- chf  3- htn   4- PAD  5- shpt   6- DKA  7- hyperkalemia  8- abd pain     hd consent obtained  hd arrangements   LE venous doppler non revealing.   follow up vascular consult  abd ct   hold renvela given abd pain   insulin protocol

## 2017-09-16 NOTE — ED PROVIDER NOTE - CRITICAL CARE PROVIDED
direct patient care (not related to procedure)/interpretation of diagnostic studies/consultation with other physicians/documentation/consult w/ pt's family directly relating to pts condition/additional history taking

## 2017-09-16 NOTE — ED ADULT NURSE NOTE - OBJECTIVE STATEMENT
62 y f came to the ed with chest pain and sob. patient states the pain began when she was walking to the car to go to dialysis. was unable to go to dialysis because the pain was too severe. states pain was similar to when she had a mi. patient is a/ox4. denies any fevers, chills. abdomen is soft and nontender. skin is warm and dry. patient c/o nausea and has vomited. patient is able to move all extremities.

## 2017-09-16 NOTE — H&P ADULT - NSHPPHYSICALEXAM_GEN_ALL_CORE
OBJECTIVE:  ICU Vital Signs Last 24 Hrs  T(C): 36.9 (16 Sep 2017 12:35), Max: 36.9 (16 Sep 2017 12:35)  T(F): 98.5 (16 Sep 2017 12:35), Max: 98.5 (16 Sep 2017 12:35)  HR: 92 (16 Sep 2017 15:06) (72 - 98)  BP: 121/54 (16 Sep 2017 15:06) (111/45 - 139/64)  BP(mean): --  ABP: --  ABP(mean): --  RR: 22 (16 Sep 2017 15:06) (22 - 25)  SpO2: 100% (16 Sep 2017 15:06) (100% - 100%)    CAPILLARY BLOOD GLUCOSE  259 (16 Sep 2017 15:06)    Physical Exam:   General: NAD, standing upright A&O x 3   HEENT: PERRL, EOMI, no oropharyngeal exudates, mucous membranes moist  Pulm: mild crackles at RLL, no rhonci or wheezes, deep breathing pattern  Cardiac: normal S1S2, no r/m/g  GI: tender to palpation at LLQ, no rebound tenderness, soft, nondistended  Extremities: LLE pitting edema to knee. 1+ bilateral radial pulses  LUE fistula with palpable trill and audible bruit  Skin: dry, warm, well perfused  Neuro: CN II-XII intact, 5/5 BUE and BLE strength, ambulating comfortably

## 2017-09-16 NOTE — ED ADULT NURSE REASSESSMENT NOTE - NS ED NURSE REASSESS COMMENT FT1
consult was called to endo/diabetes team for the patients insulin pump. patient knows how to operate her pump. spoke with viky at 1100 regarding the consult.

## 2017-09-16 NOTE — ED PROVIDER NOTE - OBJECTIVE STATEMENT
62F h/o DM, ESRD on HD (Tues/Thurs/Sat), CVA, MI, CAD s/p multiple stents p/w CP. The pain is retrosternal, onset during exertion at ~8:45 this morning while walking to car, associated with SOB and vomiting. The pain radiates to the back, feels like prior MI (which had radiating pain to the back as well). No fever, cough, hemoptysis. Pt with LLE swelling, states this happens intermittently, no hx of DVT/PE. PMD: True.

## 2017-09-16 NOTE — CONSULT NOTE ADULT - SUBJECTIVE AND OBJECTIVE BOX
HPI:  62Fw/ PMHx T1DM on insulin pump, HTN, HLD, former smoker, MI, CAD s/p PCI to RCA and brachytherapy in Aug 2017, dCHF (last TTE- July 2017- mild MR, mild AS, mild-mod TR EF65%) chronic LLE pain and edema in setting of severe PVD s/p L & R fem-pop bypass  graft,  multiple vascular surgery both leg w/ fem-pop bypass revisions , Subclavian vein stenosis left s/p stent, ESRD on HD (Tu/Thr/Sat) via L AVF with oliguria, CVA with memory deficit, depression, chronic pain management for LLE on oxycodone, multiple prior admissions to hospital for hyperglycemia,  who presented to ED today with complaint of acute onset CP with SOB. Pt had been at baseline state of health day prior to admission. On day of presentation, she had acute episode of CP similar to that experienced during her prior MI, with radiation to the back, and  acute SOB while ambulating to the car. Symptoms were associated with lightheadedness, and blurry vision. SHe denied any fevers, chills, cough, or dysuria.  Pt's sxs improved with lying supine after taking baby aspirin, and additional 2 baby aspirin provided by EMS. Upon arriving at the ED, pt experienced nausea and nonbloody emesis. Pt uses insulin pump, which she reports had been working without problems. Her fingerstick BS had been 180 earlier today.     In ED VS HR 72, /45, RR 25, SpO2 100% on 2LNC  In ED, patient was given aspirin 81mg PO, regular insulin 5 units IV, zofran 4mg IVP x1, albuterol neb x1, and started on insulin gtt at 6units/hr. Pt's repeat fingerstick BS of 447. (16 Sep 2017 15:18)      PAST MEDICAL & SURGICAL HISTORY:  Subclavian vein stenosis, left: s/p stent  Cellulitis: 2015 left foot  DKA, type 1: 1/2015  ACS (acute coronary syndrome): 1/2015 - cath revealed 100% ostial stenosis not amenable to PCI - medical management  MI, old  TIA (transient ischemic attack): x 2 - 8-9 years ago prior to ASD/VSD repair  CAD (coronary artery disease): s/p stents  Murmur, cardiac  Gout: past  CVA (cerebral infarction): with no residual, 8 yrs ago, prior to heart surgery - ST memory loss  Peripheral vascular disease: occluded left fem-pop bypass 5/2015  Diabetes mellitus type 1: Insulin Dependent - [employer]  Minimed Paradigm Insulin Pump - Novolog  ESRD (end stage renal disease): dialysis , tue, thursday, saturday  Hyperlipidemia  Status post device closure of ASD: &quot;clamshell&quot;  History of cardiac catheterization: 1/2015 - no intervention  S/P femoral-popliteal bypass surgery: L and R in 2013 with graft; 5/2015 CFA angioplasty left and ileofemoral endarterectomywith vein patch angioplasty of left fem-pop bypass graft  Multiple vascular surgery both leg, left fempop bypass revision 11/2015  AV (arteriovenous fistula): Left AV graft; revision with stent placement 2-3 years ago  S/P cholecystectomy      Review of Systems:   CONSTITUTIONAL: No fever, weight loss, or fatigue  EYES: No eye pain, visual disturbances, or discharge  ENMT:  No difficulty hearing, tinnitus, vertigo; No sinus or throat pain  NECK: No pain or stiffness  BREASTS: No pain, masses, or nipple discharge  RESPIRATORY: No cough, wheezing, chills or hemoptysis; No shortness of breath  CARDIOVASCULAR: No chest pain, palpitations, dizziness, or leg swelling  GASTROINTESTINAL: No abdominal or epigastric pain. No nausea, vomiting, or hematemesis; No diarrhea or constipation. No melena or hematochezia.  GENITOURINARY: No dysuria, frequency, hematuria, or incontinence  NEUROLOGICAL: No headaches, memory loss, loss of strength, numbness, or tremors  SKIN: No itching, burning, rashes, or lesions   LYMPH NODES: No enlarged glands  ENDOCRINE: No heat or cold intolerance; No hair loss  MUSCULOSKELETAL: No joint pain or swelling; No muscle, back, or extremity pain  PSYCHIATRIC: No depression, anxiety, mood swings, or difficulty sleeping  HEME/LYMPH: No easy bruising, or bleeding gums  ALLERY AND IMMUNOLOGIC: No hives or eczema    Allergies    No Known Allergies    Intolerances        Social History:     FAMILY HISTORY:  Family history of smoking  Family history of hypertension  Family history of cancer (Sibling)      MEDICATIONS  (STANDING):  aspirin enteric coated 81 milliGRAM(s) Oral daily  insulin Infusion 6 Unit(s)/Hr (6 mL/Hr) IV Continuous <Continuous>    MEDICATIONS  (PRN):        CAPILLARY BLOOD GLUCOSE  259 (16 Sep 2017 15:06)  349 (16 Sep 2017 14:29)  420 (16 Sep 2017 13:25)  447 (16 Sep 2017 13:04)  430 (16 Sep 2017 12:22)  342 (16 Sep 2017 09:44)        I&O's Summary      PHYSICAL EXAM:  GENERAL: NAD, well-developed  HEAD:  Atraumatic, Normocephalic  EYES: EOMI, PERRLA, conjunctiva and sclera clear  NECK: Supple, No JVD  CHEST/LUNG: Clear to auscultation bilaterally; No wheeze  HEART: Regular rate and rhythm; No murmurs, rubs, or gallops  ABDOMEN: Soft, Nontender, Nondistended; Bowel sounds present  EXTREMITIES:  2+ Peripheral Pulses, No clubbing, cyanosis, or edema  PSYCH: AAOx3  NEUROLOGY: non-focal  SKIN: No rashes or lesions    LABS:                        10.8   8.3   )-----------( 283      ( 16 Sep 2017 10:00 )             33.5     09-16    137  |  89<L>  |  43<H>  ----------------------------<  418<H>  4.6   |  19<L>  |  6.92<H>    Ca    7.6<L>      16 Sep 2017 13:48  Phos  4.1     09-16  Mg     2.2     09-16    TPro  7.8  /  Alb  4.6  /  TBili  0.3  /  DBili  x   /  AST  32  /  ALT  15  /  AlkPhos  241<H>  09-16    PT/INR - ( 16 Sep 2017 10:00 )   PT: 10.2 sec;   INR: 0.95 ratio         PTT - ( 16 Sep 2017 10:00 )  PTT:29.3 sec  CARDIAC MARKERS ( 16 Sep 2017 10:00 )  x     / 0.10 ng/mL / 243 U/L / x     / 3.8 ng/mL          RADIOLOGY & ADDITIONAL TESTS:    Imaging Personally Reviewed:    Consultant(s) Notes Reviewed:      Care Discussed with Consultants/Other Providers:

## 2017-09-16 NOTE — H&P ADULT - NSHPREVIEWOFSYSTEMS_GEN_ALL_CORE
Review of Systems:   CONSTITUTIONAL: No weakness, fevers or chills  EYES: Blurry vision, improved from earlier this AM  ENT: no pharyngitis, no neck swelling  RESPIRATORY: No cough, wheezing, hemoptysis, + SOB,  CARDIOVASCULAR: complains of chest pain without palpitations  GASTROINTESTINAL: Nausea and vomiting which started when in the ED, denies diarrhea or constipation, endorses abdominal tenderness but currently comfortable  GENITOURINARY: minimal urine production  NEUROLOGICAL: Chronic decreased sensation of LLE, no headaches, no hearing change, mild blurry vision similar to that incurred with previous episodes of hyperkalemia  SKIN: No itching, burning, rashes, or lesions

## 2017-09-17 DIAGNOSIS — Z46.81 ENCOUNTER FOR FITTING AND ADJUSTMENT OF INSULIN PUMP: ICD-10-CM

## 2017-09-17 DIAGNOSIS — E10.11 TYPE 1 DIABETES MELLITUS WITH KETOACIDOSIS WITH COMA: ICD-10-CM

## 2017-09-17 LAB
ACETONE SERPL-MCNC: ABNORMAL
ACETONE SERPL-MCNC: NEGATIVE — SIGNIFICANT CHANGE UP
ALBUMIN SERPL ELPH-MCNC: 3.6 G/DL — SIGNIFICANT CHANGE UP (ref 3.3–5)
ALP SERPL-CCNC: 198 U/L — HIGH (ref 40–120)
ALT FLD-CCNC: 12 U/L RC — SIGNIFICANT CHANGE UP (ref 10–45)
ANION GAP SERPL CALC-SCNC: 14 MMOL/L — SIGNIFICANT CHANGE UP (ref 5–17)
ANION GAP SERPL CALC-SCNC: 14 MMOL/L — SIGNIFICANT CHANGE UP (ref 5–17)
ANION GAP SERPL CALC-SCNC: 17 MMOL/L — SIGNIFICANT CHANGE UP (ref 5–17)
ANION GAP SERPL CALC-SCNC: 19 MMOL/L — HIGH (ref 5–17)
ANION GAP SERPL CALC-SCNC: 22 MMOL/L — HIGH (ref 5–17)
ANION GAP SERPL CALC-SCNC: 23 MMOL/L — HIGH (ref 5–17)
ANION GAP SERPL CALC-SCNC: 31 MMOL/L — HIGH (ref 5–17)
APTT BLD: 21.7 SEC — LOW (ref 27.5–37.4)
AST SERPL-CCNC: 22 U/L — SIGNIFICANT CHANGE UP (ref 10–40)
BASE EXCESS BLDV CALC-SCNC: -0.8 MMOL/L — SIGNIFICANT CHANGE UP (ref -2–2)
BASE EXCESS BLDV CALC-SCNC: 5.1 MMOL/L — HIGH (ref -2–2)
BASOPHILS # BLD AUTO: 0 K/UL — SIGNIFICANT CHANGE UP (ref 0–0.2)
BASOPHILS NFR BLD AUTO: 0.3 % — SIGNIFICANT CHANGE UP (ref 0–2)
BILIRUB SERPL-MCNC: 0.3 MG/DL — SIGNIFICANT CHANGE UP (ref 0.2–1.2)
BUN SERPL-MCNC: 15 MG/DL — SIGNIFICANT CHANGE UP (ref 7–23)
BUN SERPL-MCNC: 15 MG/DL — SIGNIFICANT CHANGE UP (ref 7–23)
BUN SERPL-MCNC: 17 MG/DL — SIGNIFICANT CHANGE UP (ref 7–23)
BUN SERPL-MCNC: 18 MG/DL — SIGNIFICANT CHANGE UP (ref 7–23)
BUN SERPL-MCNC: 20 MG/DL — SIGNIFICANT CHANGE UP (ref 7–23)
BUN SERPL-MCNC: 21 MG/DL — SIGNIFICANT CHANGE UP (ref 7–23)
BUN SERPL-MCNC: 27 MG/DL — HIGH (ref 7–23)
CALCIUM SERPL-MCNC: 7.5 MG/DL — LOW (ref 8.4–10.5)
CALCIUM SERPL-MCNC: 7.8 MG/DL — LOW (ref 8.4–10.5)
CALCIUM SERPL-MCNC: 8.2 MG/DL — LOW (ref 8.4–10.5)
CALCIUM SERPL-MCNC: 8.3 MG/DL — LOW (ref 8.4–10.5)
CALCIUM SERPL-MCNC: 8.3 MG/DL — LOW (ref 8.4–10.5)
CALCIUM SERPL-MCNC: 8.6 MG/DL — SIGNIFICANT CHANGE UP (ref 8.4–10.5)
CALCIUM SERPL-MCNC: 8.6 MG/DL — SIGNIFICANT CHANGE UP (ref 8.4–10.5)
CHLORIDE SERPL-SCNC: 82 MMOL/L — LOW (ref 96–108)
CHLORIDE SERPL-SCNC: 86 MMOL/L — LOW (ref 96–108)
CHLORIDE SERPL-SCNC: 89 MMOL/L — LOW (ref 96–108)
CHLORIDE SERPL-SCNC: 90 MMOL/L — LOW (ref 96–108)
CHLORIDE SERPL-SCNC: 92 MMOL/L — LOW (ref 96–108)
CHLORIDE SERPL-SCNC: 94 MMOL/L — LOW (ref 96–108)
CHLORIDE SERPL-SCNC: 94 MMOL/L — LOW (ref 96–108)
CK MB BLD-MCNC: 2.1 % — SIGNIFICANT CHANGE UP (ref 0–3.5)
CK MB BLD-MCNC: 2.3 % — SIGNIFICANT CHANGE UP (ref 0–3.5)
CK MB BLD-MCNC: 2.5 % — SIGNIFICANT CHANGE UP (ref 0–3.5)
CK MB CFR SERPL CALC: 2.3 NG/ML — SIGNIFICANT CHANGE UP (ref 0–3.8)
CK MB CFR SERPL CALC: 3.4 NG/ML — SIGNIFICANT CHANGE UP (ref 0–3.8)
CK MB CFR SERPL CALC: 3.7 NG/ML — SIGNIFICANT CHANGE UP (ref 0–3.8)
CK SERPL-CCNC: 109 U/L — SIGNIFICANT CHANGE UP (ref 25–170)
CK SERPL-CCNC: 137 U/L — SIGNIFICANT CHANGE UP (ref 25–170)
CK SERPL-CCNC: 163 U/L — SIGNIFICANT CHANGE UP (ref 25–170)
CO2 BLDV-SCNC: 26 MMOL/L — SIGNIFICANT CHANGE UP (ref 22–30)
CO2 BLDV-SCNC: 32 MMOL/L — HIGH (ref 22–30)
CO2 SERPL-SCNC: 17 MMOL/L — LOW (ref 22–31)
CO2 SERPL-SCNC: 24 MMOL/L — SIGNIFICANT CHANGE UP (ref 22–31)
CO2 SERPL-SCNC: 27 MMOL/L — SIGNIFICANT CHANGE UP (ref 22–31)
CO2 SERPL-SCNC: 29 MMOL/L — SIGNIFICANT CHANGE UP (ref 22–31)
CO2 SERPL-SCNC: 30 MMOL/L — SIGNIFICANT CHANGE UP (ref 22–31)
CREAT SERPL-MCNC: 3.5 MG/DL — HIGH (ref 0.5–1.3)
CREAT SERPL-MCNC: 3.77 MG/DL — HIGH (ref 0.5–1.3)
CREAT SERPL-MCNC: 4.1 MG/DL — HIGH (ref 0.5–1.3)
CREAT SERPL-MCNC: 4.38 MG/DL — HIGH (ref 0.5–1.3)
CREAT SERPL-MCNC: 4.68 MG/DL — HIGH (ref 0.5–1.3)
CREAT SERPL-MCNC: 4.92 MG/DL — HIGH (ref 0.5–1.3)
CREAT SERPL-MCNC: 5.26 MG/DL — HIGH (ref 0.5–1.3)
EOSINOPHIL # BLD AUTO: 0.1 K/UL — SIGNIFICANT CHANGE UP (ref 0–0.5)
EOSINOPHIL NFR BLD AUTO: 0.9 % — SIGNIFICANT CHANGE UP (ref 0–6)
GAS PNL BLDV: SIGNIFICANT CHANGE UP
GAS PNL BLDV: SIGNIFICANT CHANGE UP
GLUCOSE SERPL-MCNC: 105 MG/DL — HIGH (ref 70–99)
GLUCOSE SERPL-MCNC: 187 MG/DL — HIGH (ref 70–99)
GLUCOSE SERPL-MCNC: 208 MG/DL — HIGH (ref 70–99)
GLUCOSE SERPL-MCNC: 283 MG/DL — HIGH (ref 70–99)
GLUCOSE SERPL-MCNC: 440 MG/DL — HIGH (ref 70–99)
GLUCOSE SERPL-MCNC: 95 MG/DL — SIGNIFICANT CHANGE UP (ref 70–99)
HCO3 BLDV-SCNC: 24 MMOL/L — SIGNIFICANT CHANGE UP (ref 21–29)
HCO3 BLDV-SCNC: 31 MMOL/L — HIGH (ref 21–29)
HCT VFR BLD CALC: 28.9 % — LOW (ref 34.5–45)
HGB BLD-MCNC: 9.6 G/DL — LOW (ref 11.5–15.5)
HOROWITZ INDEX BLDV+IHG-RTO: 28 — SIGNIFICANT CHANGE UP
HOROWITZ INDEX BLDV+IHG-RTO: 28 — SIGNIFICANT CHANGE UP
INR BLD: 0.98 RATIO — SIGNIFICANT CHANGE UP (ref 0.88–1.16)
LACTATE BLDV-MCNC: 1.1 MMOL/L — SIGNIFICANT CHANGE UP (ref 0.7–2)
LACTATE BLDV-MCNC: 1.3 MMOL/L — SIGNIFICANT CHANGE UP (ref 0.7–2)
LYMPHOCYTES # BLD AUTO: 0.8 K/UL — LOW (ref 1–3.3)
LYMPHOCYTES # BLD AUTO: 12.1 % — LOW (ref 13–44)
MAGNESIUM SERPL-MCNC: 2 MG/DL — SIGNIFICANT CHANGE UP (ref 1.6–2.6)
MAGNESIUM SERPL-MCNC: 2.1 MG/DL — SIGNIFICANT CHANGE UP (ref 1.6–2.6)
MAGNESIUM SERPL-MCNC: 2.2 MG/DL — SIGNIFICANT CHANGE UP (ref 1.6–2.6)
MAGNESIUM SERPL-MCNC: 2.5 MG/DL — SIGNIFICANT CHANGE UP (ref 1.6–2.6)
MCHC RBC-ENTMCNC: 33.2 GM/DL — SIGNIFICANT CHANGE UP (ref 32–36)
MCHC RBC-ENTMCNC: 34.8 PG — HIGH (ref 27–34)
MCV RBC AUTO: 105 FL — HIGH (ref 80–100)
MONOCYTES # BLD AUTO: 0.7 K/UL — SIGNIFICANT CHANGE UP (ref 0–0.9)
MONOCYTES NFR BLD AUTO: 10.1 % — SIGNIFICANT CHANGE UP (ref 2–14)
NEUTROPHILS # BLD AUTO: 5 K/UL — SIGNIFICANT CHANGE UP (ref 1.8–7.4)
NEUTROPHILS NFR BLD AUTO: 76.6 % — SIGNIFICANT CHANGE UP (ref 43–77)
PCO2 BLDV: 46 MMHG — SIGNIFICANT CHANGE UP (ref 35–50)
PCO2 BLDV: 54 MMHG — HIGH (ref 35–50)
PH BLDV: 7.34 — LOW (ref 7.35–7.45)
PH BLDV: 7.38 — SIGNIFICANT CHANGE UP (ref 7.35–7.45)
PHOSPHATE SERPL-MCNC: 3.3 MG/DL — SIGNIFICANT CHANGE UP (ref 2.5–4.5)
PHOSPHATE SERPL-MCNC: 4 MG/DL — SIGNIFICANT CHANGE UP (ref 2.5–4.5)
PHOSPHATE SERPL-MCNC: 4.1 MG/DL — SIGNIFICANT CHANGE UP (ref 2.5–4.5)
PHOSPHATE SERPL-MCNC: 4.3 MG/DL — SIGNIFICANT CHANGE UP (ref 2.5–4.5)
PLATELET # BLD AUTO: 239 K/UL — SIGNIFICANT CHANGE UP (ref 150–400)
PO2 BLDV: 34 MMHG — SIGNIFICANT CHANGE UP (ref 25–45)
PO2 BLDV: 45 MMHG — SIGNIFICANT CHANGE UP (ref 25–45)
POTASSIUM SERPL-MCNC: 4 MMOL/L — SIGNIFICANT CHANGE UP (ref 3.5–5.3)
POTASSIUM SERPL-MCNC: 4.3 MMOL/L — SIGNIFICANT CHANGE UP (ref 3.5–5.3)
POTASSIUM SERPL-MCNC: 4.6 MMOL/L — SIGNIFICANT CHANGE UP (ref 3.5–5.3)
POTASSIUM SERPL-MCNC: 4.9 MMOL/L — SIGNIFICANT CHANGE UP (ref 3.5–5.3)
POTASSIUM SERPL-MCNC: 5.3 MMOL/L — SIGNIFICANT CHANGE UP (ref 3.5–5.3)
POTASSIUM SERPL-MCNC: 5.8 MMOL/L — HIGH (ref 3.5–5.3)
POTASSIUM SERPL-MCNC: 5.9 MMOL/L — HIGH (ref 3.5–5.3)
POTASSIUM SERPL-SCNC: 4 MMOL/L — SIGNIFICANT CHANGE UP (ref 3.5–5.3)
POTASSIUM SERPL-SCNC: 4.3 MMOL/L — SIGNIFICANT CHANGE UP (ref 3.5–5.3)
POTASSIUM SERPL-SCNC: 4.6 MMOL/L — SIGNIFICANT CHANGE UP (ref 3.5–5.3)
POTASSIUM SERPL-SCNC: 4.9 MMOL/L — SIGNIFICANT CHANGE UP (ref 3.5–5.3)
POTASSIUM SERPL-SCNC: 5.3 MMOL/L — SIGNIFICANT CHANGE UP (ref 3.5–5.3)
POTASSIUM SERPL-SCNC: 5.8 MMOL/L — HIGH (ref 3.5–5.3)
POTASSIUM SERPL-SCNC: 5.9 MMOL/L — HIGH (ref 3.5–5.3)
PROT SERPL-MCNC: 6.5 G/DL — SIGNIFICANT CHANGE UP (ref 6–8.3)
PROTHROM AB SERPL-ACNC: 10.6 SEC — SIGNIFICANT CHANGE UP (ref 9.8–12.7)
RBC # BLD: 2.75 M/UL — LOW (ref 3.8–5.2)
RBC # FLD: 13.2 % — SIGNIFICANT CHANGE UP (ref 10.3–14.5)
SAO2 % BLDV: 59 % — LOW (ref 67–88)
SAO2 % BLDV: 76 % — SIGNIFICANT CHANGE UP (ref 67–88)
SODIUM SERPL-SCNC: 130 MMOL/L — LOW (ref 135–145)
SODIUM SERPL-SCNC: 133 MMOL/L — LOW (ref 135–145)
SODIUM SERPL-SCNC: 137 MMOL/L — SIGNIFICANT CHANGE UP (ref 135–145)
SODIUM SERPL-SCNC: 138 MMOL/L — SIGNIFICANT CHANGE UP (ref 135–145)
SODIUM SERPL-SCNC: 138 MMOL/L — SIGNIFICANT CHANGE UP (ref 135–145)
TROPONIN T SERPL-MCNC: 0.14 NG/ML — HIGH (ref 0–0.06)
TROPONIN T SERPL-MCNC: 0.16 NG/ML — HIGH (ref 0–0.06)
TROPONIN T SERPL-MCNC: 0.17 NG/ML — HIGH (ref 0–0.06)
WBC # BLD: 6.6 K/UL — SIGNIFICANT CHANGE UP (ref 3.8–10.5)
WBC # FLD AUTO: 6.6 K/UL — SIGNIFICANT CHANGE UP (ref 3.8–10.5)

## 2017-09-17 PROCEDURE — 99233 SBSQ HOSP IP/OBS HIGH 50: CPT

## 2017-09-17 PROCEDURE — 93010 ELECTROCARDIOGRAM REPORT: CPT

## 2017-09-17 RX ORDER — DEXTROSE 50 % IN WATER 50 %
1 SYRINGE (ML) INTRAVENOUS ONCE
Qty: 0 | Refills: 0 | Status: DISCONTINUED | OUTPATIENT
Start: 2017-09-17 | End: 2017-09-20

## 2017-09-17 RX ORDER — DEXTROSE 50 % IN WATER 50 %
25 SYRINGE (ML) INTRAVENOUS ONCE
Qty: 0 | Refills: 0 | Status: DISCONTINUED | OUTPATIENT
Start: 2017-09-17 | End: 2017-09-20

## 2017-09-17 RX ORDER — SEVELAMER CARBONATE 2400 MG/1
2400 POWDER, FOR SUSPENSION ORAL
Qty: 0 | Refills: 0 | Status: DISCONTINUED | OUTPATIENT
Start: 2017-09-17 | End: 2017-09-20

## 2017-09-17 RX ORDER — ONDANSETRON 8 MG/1
4 TABLET, FILM COATED ORAL ONCE
Qty: 0 | Refills: 0 | Status: COMPLETED | OUTPATIENT
Start: 2017-09-17 | End: 2017-09-17

## 2017-09-17 RX ORDER — DEXTROSE 10 % IN WATER 10 %
500 INTRAVENOUS SOLUTION INTRAVENOUS
Qty: 0 | Refills: 0 | Status: DISCONTINUED | OUTPATIENT
Start: 2017-09-17 | End: 2017-09-17

## 2017-09-17 RX ORDER — OXYCODONE AND ACETAMINOPHEN 5; 325 MG/1; MG/1
1 TABLET ORAL AT BEDTIME
Qty: 0 | Refills: 0 | Status: DISCONTINUED | OUTPATIENT
Start: 2017-09-17 | End: 2017-09-20

## 2017-09-17 RX ORDER — DEXTROSE 50 % IN WATER 50 %
12.5 SYRINGE (ML) INTRAVENOUS ONCE
Qty: 0 | Refills: 0 | Status: DISCONTINUED | OUTPATIENT
Start: 2017-09-17 | End: 2017-09-20

## 2017-09-17 RX ORDER — GLUCAGON INJECTION, SOLUTION 0.5 MG/.1ML
1 INJECTION, SOLUTION SUBCUTANEOUS ONCE
Qty: 0 | Refills: 0 | Status: DISCONTINUED | OUTPATIENT
Start: 2017-09-17 | End: 2017-09-20

## 2017-09-17 RX ORDER — INSULIN LISPRO 100/ML
1 VIAL (ML) SUBCUTANEOUS
Qty: 0 | Refills: 0 | Status: DISCONTINUED | OUTPATIENT
Start: 2017-09-17 | End: 2017-09-18

## 2017-09-17 RX ORDER — SODIUM CHLORIDE 9 MG/ML
1000 INJECTION, SOLUTION INTRAVENOUS
Qty: 0 | Refills: 0 | Status: DISCONTINUED | OUTPATIENT
Start: 2017-09-17 | End: 2017-09-20

## 2017-09-17 RX ORDER — INSULIN HUMAN 100 [IU]/ML
1 INJECTION, SOLUTION SUBCUTANEOUS
Qty: 100 | Refills: 0 | Status: DISCONTINUED | OUTPATIENT
Start: 2017-09-17 | End: 2017-09-18

## 2017-09-17 RX ORDER — OXYCODONE AND ACETAMINOPHEN 5; 325 MG/1; MG/1
1 TABLET ORAL ONCE
Qty: 0 | Refills: 0 | Status: DISCONTINUED | OUTPATIENT
Start: 2017-09-17 | End: 2017-09-17

## 2017-09-17 RX ORDER — DULOXETINE HYDROCHLORIDE 30 MG/1
60 CAPSULE, DELAYED RELEASE ORAL DAILY
Qty: 0 | Refills: 0 | Status: DISCONTINUED | OUTPATIENT
Start: 2017-09-17 | End: 2017-09-20

## 2017-09-17 RX ADMIN — Medication 30 MILLILITER(S): at 06:00

## 2017-09-17 RX ADMIN — OXYCODONE AND ACETAMINOPHEN 1 TABLET(S): 5; 325 TABLET ORAL at 23:05

## 2017-09-17 RX ADMIN — INSULIN HUMAN 2 UNIT(S)/HR: 100 INJECTION, SOLUTION SUBCUTANEOUS at 05:46

## 2017-09-17 RX ADMIN — INSULIN HUMAN 1 UNIT(S)/HR: 100 INJECTION, SOLUTION SUBCUTANEOUS at 00:24

## 2017-09-17 RX ADMIN — DULOXETINE HYDROCHLORIDE 60 MILLIGRAM(S): 30 CAPSULE, DELAYED RELEASE ORAL at 22:12

## 2017-09-17 RX ADMIN — ATORVASTATIN CALCIUM 20 MILLIGRAM(S): 80 TABLET, FILM COATED ORAL at 00:27

## 2017-09-17 RX ADMIN — Medication 30 MILLILITER(S): at 01:48

## 2017-09-17 RX ADMIN — Medication 50 MILLILITER(S): at 03:13

## 2017-09-17 RX ADMIN — ATORVASTATIN CALCIUM 20 MILLIGRAM(S): 80 TABLET, FILM COATED ORAL at 22:11

## 2017-09-17 RX ADMIN — SEVELAMER CARBONATE 2400 MILLIGRAM(S): 2400 POWDER, FOR SUSPENSION ORAL at 17:39

## 2017-09-17 RX ADMIN — Medication 30 MILLILITER(S): at 00:24

## 2017-09-17 RX ADMIN — CLOPIDOGREL BISULFATE 75 MILLIGRAM(S): 75 TABLET, FILM COATED ORAL at 00:27

## 2017-09-17 RX ADMIN — Medication 60 MILLILITER(S): at 01:07

## 2017-09-17 RX ADMIN — Medication 20 MILLILITER(S): at 06:56

## 2017-09-17 RX ADMIN — OXYCODONE AND ACETAMINOPHEN 1 TABLET(S): 5; 325 TABLET ORAL at 17:38

## 2017-09-17 RX ADMIN — ONDANSETRON 4 MILLIGRAM(S): 8 TABLET, FILM COATED ORAL at 23:36

## 2017-09-17 RX ADMIN — CLOPIDOGREL BISULFATE 75 MILLIGRAM(S): 75 TABLET, FILM COATED ORAL at 22:11

## 2017-09-17 RX ADMIN — Medication 81 MILLIGRAM(S): at 13:54

## 2017-09-17 RX ADMIN — Medication 30 MILLILITER(S): at 01:45

## 2017-09-17 RX ADMIN — OXYCODONE AND ACETAMINOPHEN 1 TABLET(S): 5; 325 TABLET ORAL at 00:00

## 2017-09-17 NOTE — PROGRESS NOTE ADULT - ASSESSMENT
62Fw/ PMHx T1DM on insulin pump, HTN, HLD, former smoker, MI, CAD s/p PCI to RCA and brachytherapy in Aug 2017, dCHF (last TTE- July 2017- mild MR, mild AS, mild-mod TR EF65%) chronic LLE pain and edema in setting of severe PVD s/p L & R fem-pop bypass  graft,  multiple vascular surgery both leg w/ fem-pop bypass revisions , Subclavian vein stenosis left s/p stent, ESRD on HD (Tu/Thr/Sat) via L AVF with oliguria, CVA with memory deficit, depression, chronic pain management for LLE on oxycodone, multiple prior admissions to hospital for hyperglycemia,  who presented to ED today with complaint of acute onset CP with SOB, found to have hyperkalemia, DKA and dynamic T wave changes. Patient now with resolved DKA and will transition from insulin drip back onto her home insulin pump with the assistance of endocrine.     #Neuro  - no acute problems 	    Pulmonary: has mild Kussmaul respiration, will treat underlying cause, DKA  No evidence of PE, or acute primary pulmonary pathology on CTPA     Cardiac: hemodynamically WNL at this time  - dynamic T wave changes, will continue cardiac monitoring  - no evidence of aortic dissection  - will continue home aspirin, plavix, atorvastatin   - BP currently WNL, thus will hold oral hydralazine,   - will hold oral lisinopril for now  - cardiac enzymes currently appear to be at baseline; will repeat EKG and trend cardiac enzymes    GI: maintain NPO for now, except for medications    Renal:   - nephrology team recommendations appreciated; pt will require hemodialysis today   - appears euvolemic at this time, will not give additional IVF  - last BMP found persistent anion-gap metabolic acidosis persistently at 29, with HCO3 now at 19, and lactate is downtrending to 3.1   No clear underlying etiology for DKA  - hold home sevelamer and cinacalcet for now    Endocrine:  - will continue insulin gtt at 6u/hr  - repeat BMP, serum acetone and VBG q6 hrs   - has insulin pump- will consult house endocrinology team    MSK: hold home percocet for now    Rosa Bravo MD, pager [phone] 62Fw/ PMHx T1DM on insulin pump, HTN, HLD, former smoker, MI, CAD s/p PCI to RCA and brachytherapy in Aug 2017, dCHF (last TTE- July 2017- mild MR, mild AS, mild-mod TR EF65%) chronic LLE pain and edema in setting of severe PVD s/p L & R fem-pop bypass  graft,  multiple vascular surgery both leg w/ fem-pop bypass revisions , Subclavian vein stenosis left s/p stent, ESRD on HD (Tu/Thr/Sat) via L AVF with oliguria, CVA with memory deficit, depression, chronic pain management for LLE on oxycodone, multiple prior admissions to hospital for hyperglycemia,  who presented to ED today with complaint of acute onset CP with SOB, found to have hyperkalemia, DKA and dynamic T wave changes. Patient now with resolved DKA and will transition from insulin drip back onto her home insulin pump with the assistance of endocrine.     #Neuro  - no acute problems 	    #Pulm   - saturating well on 2L NC, no issues    #Cardiac  - hemodynamically stable  - EKG from admission (9/16) showed dynamic T wave changes, will repeat EKG today   - CE 0.10 trop x2 and third trop 0.14, will repeat one more set with 2pm labs   - complained of chest pain on admission, now resolved, CTA negative  - on home aspirin, plavix, and atorvastatin   - BP within normal limits, holding home BP meds including hydralazine and lisinopril     #GI  - will start on diet --> once she is eating and her FS is acceptable, will discontinue D20    #Renal   - nephro on board, s/p HD last night (9/16), removed 2L  - last BMP with AG at 14, negative acetone, and HCO3 29  - holding home sevelamer and cinacalcet, will restart once clearly tolerating PO    #Endocrine:  - currently on insulin drip at 1U, endocrine team restarted pump and gave recommendation to shut off insulin drip 2 hours after pump is started  - will shut of D20 once patient starts eating   - repeat BMP, serum acetone and VBG at 2pm --> based on results will switch to daily labs      MSK: hold home percocet for now    Rosa Bravo MD, pager [phone] 62Fw/ PMHx T1DM on insulin pump, HTN, HLD, former smoker, MI, CAD s/p PCI to RCA and brachytherapy in Aug 2017, dCHF (last TTE- July 2017- mild MR, mild AS, mild-mod TR EF65%) chronic LLE pain and edema in setting of severe PVD s/p L & R fem-pop bypass  graft,  multiple vascular surgery both leg w/ fem-pop bypass revisions , Subclavian vein stenosis left s/p stent, ESRD on HD (Tu/Thr/Sat) via L AVF with oliguria, CVA with memory deficit, depression, chronic pain management for LLE on oxycodone, multiple prior admissions to hospital for hyperglycemia,  who presented to ED today with complaint of acute onset CP with SOB, found to have hyperkalemia, DKA and dynamic T wave changes. Patient now with resolved DKA and will transition from insulin drip back onto her home insulin pump with the assistance of endocrine.     #Neuro  - no acute problems 	    #Pulm   - saturating well on 2L NC, no issues    #Cardiac  - hemodynamically stable  - EKG from admission (9/16) showed dynamic T wave changes, will repeat EKG today   - CE 0.10 trop x2 and third trop 0.14, will repeat one more set with 2pm labs and start heparin drip if increased  - complained of chest pain on admission, now resolved, CTA negative  - on home aspirin, plavix, and atorvastatin   - BP within normal limits, holding home BP meds including PO hydralazine and lisinopril     #GI  - will start on diet --> once she is eating and her FS is acceptable, will discontinue D20    #Renal   - nephro on board, s/p HD last night (9/16), removed 2L  - last BMP with AG at 14, negative acetone, and HCO3 29  - holding home sevelamer and cinacalcet, will restart once clearly tolerating PO    #Endocrine:  - currently on insulin drip at 1U, endocrine team restarted pump and gave recommendation to shut off insulin drip 2 hours after pump is started  - will shut of D20 once patient starts eating   - repeat BMP, serum acetone and VBG at 2pm --> based on results will switch to daily labs      MSK: hold home percocet for now    Rosa Bravo MD, pager [phone] 62Fw/ PMHx T1DM on insulin pump, HTN, HLD, former smoker, MI, CAD s/p PCI to RCA and brachytherapy in Aug 2017, dCHF (last TTE- July 2017- mild MR, mild AS, mild-mod TR EF65%) chronic LLE pain and edema in setting of severe PVD s/p L & R fem-pop bypass  graft,  multiple vascular surgery both leg w/ fem-pop bypass revisions , Subclavian vein stenosis left s/p stent, ESRD on HD (Tu/Thr/Sat) via L AVF with oliguria, CVA with memory deficit, depression, chronic pain management for LLE on oxycodone, multiple prior admissions to hospital for hyperglycemia,  who presented to the ED with acute onset CP with SOB, found to have hyperkalemia, DKA and dynamic T wave changes. Patient now with resolved DKA and will transition from insulin drip back onto her home insulin pump with the assistance of endocrine.     #Neuro  - no acute problems 	    #Pulm   - saturating well on 2L NC, no issues    #Cardiac  - hemodynamically stable  - EKG from admission (9/16) showed dynamic T wave changes, will repeat EKG today   - CE 0.10 trop x2 and third trop 0.14, will repeat one more set with 2pm labs and start heparin drip if increased  - complained of chest pain on admission, now resolved, CTA negative  - on home aspirin, plavix, and atorvastatin   - BP within normal limits, holding home BP meds including PO hydralazine and lisinopril     #GI  - will start on diet --> once she is eating, will start to come down on the rate of D20 and titrate off    #Renal   - nephro on board, s/p HD last night (9/16), removed 2L, next dialysis due tuesday  - last BMP with AG at 14, negative acetone, and HCO3 29  - holding home sevelamer and cinacalcet, will restart once clearly tolerating PO    #Endocrine:  - currently on insulin drip at 1U, endocrine team restarted pump and gave recommendation to shut off insulin drip 2 hours after pump is started  - will shut of D20 once patient starts eating   - continue q1 FS as long as patient is on the insulin drip  - repeat BMP, serum acetone and VBG at 2pm --> based on results will switch to daily labs      #MSK   - holding home percocet     #PPX  - heparin subq for DVT 62Fw/ PMHx T1DM on insulin pump, HTN, HLD, former smoker, MI, CAD s/p PCI to RCA and brachytherapy in Aug 2017, dCHF (last TTE- July 2017- mild MR, mild AS, mild-mod TR EF65%) chronic LLE pain and edema in setting of severe PVD s/p L & R fem-pop bypass  graft,  multiple vascular surgery both leg w/ fem-pop bypass revisions , Subclavian vein stenosis left s/p stent, ESRD on HD (Tu/Thr/Sat) via L AVF with oliguria, CVA with memory deficit, depression, chronic pain management for LLE on oxycodone, multiple prior admissions to hospital for hyperglycemia,  who presented to the ED with acute onset CP with SOB, found to have hyperkalemia, DKA and dynamic T wave changes. Patient now with resolved DKA and will transition from insulin drip back onto her home insulin pump with the assistance of endocrine.     #Neuro  - no acute problems 	    #Pulm   - saturating well on 2L NC, no issues    #Cardiac  - hemodynamically stable  - EKG from admission (9/16) showed dynamic T wave changes, will repeat EKG today   - CE 0.10 trop x2 and third trop 0.14, will repeat one more set with 2pm labs and start heparin drip if increased  - complained of chest pain on admission, now resolved, CTA negative  - on home aspirin, plavix, and atorvastatin   - BP within normal limits, holding home BP meds including PO hydralazine and lisinopril     #GI  - will start on diet --> once she is eating, will start to come down on the rate of D20 and titrate off    #Renal   - nephro on board, s/p HD last night (9/16), removed 2L, next dialysis on tuesday  - last BMP with AG at 14, negative acetone, and HCO3 29, f/u BMP at 2pm  - holding home sevelamer and cinacalcet, will restart once clearly tolerating PO    #Endocrine:  - currently on insulin drip at 1U, endocrine team restarted pump and gave recommendation to shut off insulin drip 2 hours after pump is started  - will shut of D20 once patient starts eating   - continue q1 FS as long as patient is on the insulin drip  - repeat BMP, serum acetone and VBG at 2pm --> based on results will switch to daily labs      #MSK   - holding home percocet     #PPX  - heparin subq for DVT 62Fw/ PMHx T1DM on insulin pump, HTN, HLD, former smoker, MI, CAD s/p PCI to RCA and brachytherapy in Aug 2017, dCHF (last TTE- July 2017- mild MR, mild AS, mild-mod TR EF65%) chronic LLE pain and edema in setting of severe PVD s/p L & R fem-pop bypass  graft,  multiple vascular surgery both leg w/ fem-pop bypass revisions , Subclavian vein stenosis left s/p stent, ESRD on HD (Tu/Thr/Sat) via L AVF with oliguria, CVA with memory deficit, depression, chronic pain management for LLE on oxycodone, multiple prior admissions to hospital for hyperglycemia,  who presented to the ED with acute onset CP with SOB, found to have hyperkalemia, DKA and dynamic T wave changes. Patient now with resolved DKA and will transition from insulin drip back onto her home insulin pump with the assistance of endocrine.     #Neuro  - no acute problems 	    #Pulm   - saturating well on 2L NC, no issues    #Cardiac  - hemodynamically stable  - EKG from admission (9/16) showed dynamic T wave changes, resolved on monitor, will repeat EKG today   - CE 0.10 trop x2 and third trop 0.14, will repeat one more set with 2pm labs and start heparin drip if + for NSTEMI  - complained of chest pain on admission, now resolved, CTA negative  - on home aspirin, plavix, and atorvastatin   - BP within normal limits, holding home BP meds including PO hydralazine and lisinopril     #GI  - will start on diet --> once she is eating, will start to come down on the rate of D20 and titrate off    #Renal   - nephro on board, s/p HD last night (9/16), removed 2L, next dialysis on Tuesday  - last BMP with AG at 14, negative acetone, and HCO3 29, f/u BMP at 2pm  - holding home sevelamer and cinacalcet, will restart once clearly tolerating PO    #Endocrine:  - currently on insulin drip at 1U, endocrine team restarted pump and gave recommendation to shut off insulin drip 2 hours after pump is started  - will shut of D20 once patient starts eating   - continue q1 FS as long as patient is on the insulin drip  - repeat BMP, serum acetone and VBG at 2pm --> based on results will switch to daily labs      #MSK   - holding home percocet     #PPX  - heparin subq for DVT

## 2017-09-17 NOTE — PROGRESS NOTE ADULT - PROBLEM SELECTOR PLAN 1
Discussed plan with MICU team.  Can placed back on insulin pump at home settings. Overlap with insulin gtt for 2 hours. Can start diet pt. to bolus via the pump  Settings:  Basal: 12am 0.275; 5am 0.375  I:C 15  Sensitivity: 12am 60; 7am 40; 6pm 60  Target 12am 110-130; 5am 100-120  Active Insulin Time: 6 hours  Monitor electrolytes.  Goal glucose 100-180

## 2017-09-17 NOTE — PROGRESS NOTE ADULT - SUBJECTIVE AND OBJECTIVE BOX
Patient is a 62y old  Female who presents with a chief complaint of chest pain, SOB, vomiting (16 Sep 2017 17:20)      SUBJECTIVE / OVERNIGHT EVENTS: Comfortable without new complaints.   Review of Systems  chest pain no  palpitations no  sob no  nausea no  headache no    MEDICATIONS  (STANDING):  aspirin enteric coated 81 milliGRAM(s) Oral daily  insulin Infusion 2 Unit(s)/Hr (2 mL/Hr) IV Continuous <Continuous>  heparin  Injectable 5000 Unit(s) SubCutaneous every 12 hours  atorvastatin 20 milliGRAM(s) Oral at bedtime  clopidogrel Tablet 75 milliGRAM(s) Oral at bedtime  influenza   Vaccine 0.5 milliLiter(s) IntraMuscular once  dextrose 50% Injectable 25 milliLiter(s) IV Push once  dextrose 20%. 500 milliLiter(s) (40 mL/Hr) IV Continuous <Continuous>  dextrose 5%. 1000 milliLiter(s) (50 mL/Hr) IV Continuous <Continuous>  dextrose 50% Injectable 12.5 Gram(s) IV Push once  dextrose 50% Injectable 25 Gram(s) IV Push once  dextrose 50% Injectable 25 Gram(s) IV Push once  insulin lispro (HumaLOG) Pump 1 Each SubCutaneous Continuous Pump    MEDICATIONS  (PRN):  dextrose Gel 1 Dose(s) Oral once PRN Blood Glucose LESS THAN 70 milliGRAM(s)/deciLiter  glucagon  Injectable 1 milliGRAM(s) IntraMuscular once PRN Glucose <70 milliGRAM(s)/deciLiter          PHYSICAL EXAM:  GENERAL: NAD, well-developed  HEAD:  Atraumatic, Normocephalic  EYES: EOMI, PERRLA, conjunctiva and sclera clear  NECK: Supple, No JVD  CHEST/LUNG: Clear to auscultation bilaterally; No wheeze  HEART: Regular rate and rhythm; No murmurs, rubs, or gallops  ABDOMEN: Soft, Nontender, Nondistended; Bowel sounds present  EXTREMITIES:  2+ Peripheral Pulses, No clubbing, cyanosis, or edema  PSYCH: AAOx3  NEUROLOGY: non-focal  SKIN: No rashes or lesions    LABS:                        9.6    6.6   )-----------( 239      ( 17 Sep 2017 02:37 )             28.9     09-17    133<L>  |  89<L>  |  18  ----------------------------<  208<H>  4.9   |  27  |  4.38<H>    Ca    8.3<L>      17 Sep 2017 14:33  Phos  4.1     09-17  Mg     2.2     09-17    TPro  6.5  /  Alb  3.6  /  TBili  0.3  /  DBili  x   /  AST  22  /  ALT  12  /  AlkPhos  198<H>  09-17    PT/INR - ( 17 Sep 2017 02:37 )   PT: 10.6 sec;   INR: 0.98 ratio         PTT - ( 17 Sep 2017 02:37 )  PTT:21.7 sec  CARDIAC MARKERS ( 17 Sep 2017 06:06 )  x     / 0.14 ng/mL / 109 U/L / x     / 2.3 ng/mL  CARDIAC MARKERS ( 16 Sep 2017 17:50 )  x     / 0.10 ng/mL / 165 U/L / x     / 2.9 ng/mL  CARDIAC MARKERS ( 16 Sep 2017 10:00 )  x     / 0.10 ng/mL / 243 U/L / x     / 3.8 ng/mL          RADIOLOGY & ADDITIONAL TESTS:    Imaging Personally Reviewed:    Consultant(s) Notes Reviewed:      Care Discussed with Consultants/Other Providers:

## 2017-09-17 NOTE — PROGRESS NOTE ADULT - ASSESSMENT
61 y/o F w/ uncontrolled Type 1 DM w/ ESRD on HD, on insulin pump admitted with DKA (high risk patient with high level decision-making)

## 2017-09-17 NOTE — PROGRESS NOTE ADULT - SUBJECTIVE AND OBJECTIVE BOX
Chief Complaint: Evaluating this 61 y/o F for uncontrolled Type 1 DM w/ hyperglycemia on insulin pump admitted with DKA w/ ESRD on HD      Interval History: Feeling better s/p HD yesterday, still on insulin gtt, wants to eat. Denies chest pain or shortness of breath.    MEDICATIONS  (STANDING):  aspirin enteric coated 81 milliGRAM(s) Oral daily  insulin Infusion 2 Unit(s)/Hr (2 mL/Hr) IV Continuous <Continuous>  heparin  Injectable 5000 Unit(s) SubCutaneous every 12 hours  atorvastatin 20 milliGRAM(s) Oral at bedtime  clopidogrel Tablet 75 milliGRAM(s) Oral at bedtime  influenza   Vaccine 0.5 milliLiter(s) IntraMuscular once  dextrose 50% Injectable 25 milliLiter(s) IV Push once  dextrose 20%. 500 milliLiter(s) (40 mL/Hr) IV Continuous <Continuous>  dextrose 5%. 1000 milliLiter(s) (50 mL/Hr) IV Continuous <Continuous>  dextrose 50% Injectable 12.5 Gram(s) IV Push once  dextrose 50% Injectable 25 Gram(s) IV Push once  dextrose 50% Injectable 25 Gram(s) IV Push once  insulin lispro (HumaLOG) Pump 1 Each SubCutaneous Continuous Pump    MEDICATIONS  (PRN):  dextrose Gel 1 Dose(s) Oral once PRN Blood Glucose LESS THAN 70 milliGRAM(s)/deciLiter  glucagon  Injectable 1 milliGRAM(s) IntraMuscular once PRN Glucose <70 milliGRAM(s)/deciLiter      Allergies    No Known Allergies    Intolerances      Review of Systems:  Constitutional: No fever  Eyes: No blurry vision  Cardiovascular: No chest pain  Respiratory: No SOB  GI: No abdominal pain, No nausea, No vomiting  Endocrine: as noted in HPI    All other negative      PHYSICAL EXAM:  VITALS: T(C): 37.1 (09-17-17 @ 08:00)  T(F): 98.8 (09-17-17 @ 08:00), Max: 98.8 (09-16-17 @ 15:45)  HR: 71 (09-17-17 @ 12:00) (71 - 92)  BP: 130/58 (09-17-17 @ 12:00) (96/51 - 151/56)  RR:  (12 - 29)  SpO2:  (91% - 100%)  Wt(kg): --  GENERAL: NAD at this time  EYES: EOMI, No proptosis  HEENT:  Atraumatic, Normocephalic,   RESPIRATORY:  full excursion, non labored  CARDIOVASCULAR: Regular rhythm; normal S1/S2, +LE peripheral edema  GI: Soft, nontender, non distended, normal bowel sounds  SKIN: Warm and dry  PSYCH: normal affect, normal mood        CAPILLARY BLOOD GLUCOSE  111 (09-17 @ 12:00)  106 (09-17 @ 11:00)  106 (09-17 @ 10:00)  119 (09-17 @ 09:00)  123 (09-17 @ 08:00)  204 (09-17 @ 07:00)  276 (09-17 @ 06:00)  280 (09-17 @ 05:00)  173 (09-17 @ 04:00)  118 (09-17 @ 03:00)  104 (09-17 @ 02:00)  128 (09-17 @ 01:00)  159 (09-17 @ 00:00)  246 (09-16 @ 23:00)  247 (09-16 @ 22:00)  191 (09-16 @ 21:00)  130 (09-16 @ 20:00)  101 (09-16 @ 19:00)  126 (09-16 @ 18:20)  70 (09-16 @ 18:00)  105 (09-16 @ 17:00)  206 (09-16 @ 15:45)  259 (09-16 @ 15:06)  349 (09-16 @ 14:29)  420 (09-16 @ 13:25)  447 (09-16 @ 13:04)  430 (09-16 @ 12:22)  342 (09-16 @ 09:44)        09-17    137  |  94<L>  |  17  ----------------------------<  105<H>  4.6   |  29  |  4.10<H>    EGFR if : 13<L>  EGFR if non : 11<L>    Ca    7.8<L>      09-17  Mg     2.5     09-17  Phos  4.0     09-17    TPro  6.5  /  Alb  3.6  /  TBili  0.3  /  DBili  x   /  AST  22  /  ALT  12  /  AlkPhos  198<H>  09-17        Thyroid Function Tests:      Hemoglobin A1C, Whole Blood: 6.8 % <H> [4.0 - 5.6] (07-24-17 @ 06:15)  Hemoglobin A1C, Whole Blood: 6.8 % <H> [4.0 - 5.6] (07-23-17 @ 22:45)

## 2017-09-17 NOTE — PROGRESS NOTE ADULT - ASSESSMENT
62 years old female multiple hospital admissions with coronary artery disease as well as congestive heart failure, peripheral vascular disease, diabetic ketoacidosis and hypertension among others as listed above.  Patient is now with hyperkalemia and abd pain along with mild recurrent DKA as well   1- esrd  2- chf  3- htn /cad  4- PAD  5- shpt   6- DKA  7- hyperkalemia resolved      hd am   chf improving after hd  insulin drip and endo consult  cont with renvela with meals  bp in range at present  cont with asa and plavix  d./w ICU team

## 2017-09-17 NOTE — PROGRESS NOTE ADULT - SUBJECTIVE AND OBJECTIVE BOX
CHIEF COMPLAINT:    Interval Events: Overnight, patient was brought up from the ED on an insulin drip at 6U. The drip was titrated down but the patient became hypoglycemic so D20 was added on. The patient was on D20 at 20cc this morning and 2U on the insulin drip was required increasing amounts of D20 as her FS continued to drop. Last FS was 106 on D20 at 40cc and insulin drip at 2U. She otherwise feels great and denies any CP, SOB, or pain. She wants to eat.     REVIEW OF SYSTEMS:  Constitutional: [x ] negative [ ] fevers [ ] chills [ ] weight loss [ ] weight gain  HEENT: [ x] negative [ ] dry eyes [ ] eye irritation [ ] postnasal drip [ ] nasal congestion  CV: [x ] negative  [ ] chest pain [ ] orthopnea [ ] palpitations [ ] murmur  Resp: [x ] negative [ ] cough [ ] shortness of breath [ ] dyspnea [ ] wheezing [ ] sputum [ ] hemoptysis  GI: [x ] negative [ ] nausea [ ] vomiting [ ] diarrhea [ ] constipation [ ] abd pain [ ] dysphagia   : [ x] negative [ ] dysuria [ ] nocturia [ ] hematuria [ ] increased urinary frequency  Musculoskeletal: [x ] negative [ ] back pain [ ] myalgias [ ] arthralgias [ ] fracture  Skin: [x ] negative [ ] rash [ ] itch  Neurological: [ x] negative [ ] headache [ ] dizziness [ ] syncope [ ] weakness [ ] numbness  Psychiatric: [x ] negative [ ] anxiety [ ] depression  Endocrine: [ ] negative [ x] diabetes [ ] thyroid problem  Hematologic/Lymphatic: [ x] negative [ ] anemia [ ] bleeding problem  Allergic/Immunologic: [x ] negative [ ] itchy eyes [ ] nasal discharge [ ] hives [ ] angioedema  [ ] All other systems negative  [ ] Unable to assess ROS because ________    OBJECTIVE:  ICU Vital Signs Last 24 Hrs  T(C): 37.1 (17 Sep 2017 08:00), Max: 37.1 (16 Sep 2017 15:45)  T(F): 98.8 (17 Sep 2017 08:00), Max: 98.8 (16 Sep 2017 15:45)  HR: 80 (17 Sep 2017 11:00) (71 - 98)  BP: 126/60 (17 Sep 2017 11:00) (96/51 - 151/56)  BP(mean): 87 (17 Sep 2017 11:00) (71 - 94)  ABP: --  ABP(mean): --  RR: 14 (17 Sep 2017 11:00) (12 - 29)  SpO2: 100% (17 Sep 2017 11:00) (91% - 100%)        09-16 @ 07:01  -  09-17 @ 07:00  --------------------------------------------------------  IN: 1225.5 mL / OUT: 2900 mL / NET: -1674.5 mL    09-17 @ 07:01  -  09-17 @ 11:57  --------------------------------------------------------  IN: 142 mL / OUT: 0 mL / NET: 142 mL      CAPILLARY BLOOD GLUCOSE  106 (17 Sep 2017 11:00)          PHYSICAL EXAM:  General: standing and watching TV, NAD  HEENT: PERRL, EOMI, MMM  Respiratory: CTAB  Cardiovascular: RRR, normal s1, s2  Abdomen: +BS, soft, no tender, non distended   Extremities: trace peripheral edema, worse on the left than the right   Skin: no rashes  Neurological: AAOx3, no focal deficits     LINES:    HOSPITAL MEDICATIONS:  Standing Meds:  aspirin enteric coated 81 milliGRAM(s) Oral daily  insulin Infusion 2 Unit(s)/Hr IV Continuous <Continuous>  heparin  Injectable 5000 Unit(s) SubCutaneous every 12 hours  atorvastatin 20 milliGRAM(s) Oral at bedtime  clopidogrel Tablet 75 milliGRAM(s) Oral at bedtime  influenza   Vaccine 0.5 milliLiter(s) IntraMuscular once  dextrose 50% Injectable 25 milliLiter(s) IV Push once  dextrose 20%. 500 milliLiter(s) IV Continuous <Continuous>      PRN Meds:      LABS:                        9.6    6.6   )-----------( 239      ( 17 Sep 2017 02:37 )             28.9     Hgb Trend: 9.6<--, 10.8<--  09-17    137  |  94<L>  |  17  ----------------------------<  105<H>  4.6   |  29  |  4.10<H>    Ca    7.8<L>      17 Sep 2017 10:31  Phos  4.0     09-17  Mg     2.5     09-17    TPro  6.5  /  Alb  3.6  /  TBili  0.3  /  DBili  x   /  AST  22  /  ALT  12  /  AlkPhos  198<H>  09-17    Creatinine Trend: 4.10<--, 3.77<--, 3.50<--, 2.84<--, 6.92<--, 6.60<--  PT/INR - ( 17 Sep 2017 02:37 )   PT: 10.6 sec;   INR: 0.98 ratio         PTT - ( 17 Sep 2017 02:37 )  PTT:21.7 sec      Venous Blood Gas:  09-17 @ 06:14  --/--/--/--/--  VBG Lactate: 1.3  Venous Blood Gas:  09-17 @ 06:10  7.34/46/45/24/76  VBG Lactate: --  Venous Blood Gas:  09-17 @ 02:34  7.38/54/34/31/59  VBG Lactate: 1.1  Venous Blood Gas:  09-16 @ 22:13  7.37/45/40/25/68  VBG Lactate: 1.9  Venous Blood Gas:  09-16 @ 13:48  7.26/49/45/21/66  VBG Lactate: 3.1  Venous Blood Gas:  09-16 @ 10:00  7.28/46/30/21/41  VBG Lactate: 3.8      MICROBIOLOGY:     RADIOLOGY:  [ ] Reviewed and interpreted by me    EKG:

## 2017-09-17 NOTE — PROGRESS NOTE ADULT - ASSESSMENT
62 f with   DKA- insulin, endocrine evaluation, ICU care  ESRD- continue HD, nephrology follow Dr. Schmidt  chest pain-patient with known CAD, stents. Known to Dr. Vela. Cardiac enzymes.  Anxiety control  ICU care  Pierce Viera MD pager [phone]

## 2017-09-17 NOTE — CHART NOTE - [id number]_GEN_A_CORE
MICU Transfer Note    Transfer from: MICU    Transfer to: (  ) Medicine    (  ) Telemetry     (   ) RCU        (    ) Palliative         (   ) Stroke Unit          (   ) __________________    Accepting physician: Dr. Viera      MICU COURSE:          ASSESSMENT & PLAN:     62Fw/ PMHx T1DM on insulin pump, HTN, HLD, former smoker, MI, CAD s/p PCI to RCA and brachytherapy in Aug 2017, dCHF (last TTE- July 2017- mild MR, mild AS, mild-mod TR EF65%) chronic LLE pain and edema in setting of severe PVD s/p L & R fem-pop bypass  graft,  multiple vascular surgery both leg w/ fem-pop bypass revisions , Subclavian vein stenosis left s/p stent, ESRD on HD (Tu/Thr/Sat) via L AVF with oliguria, CVA with memory deficit, depression, chronic pain management for LLE on oxycodone, multiple prior admissions to hospital for hyperglycemia,  who presented to the ED with acute onset CP with SOB, found to have hyperkalemia, DKA and dynamic T wave changes. Patient now with resolved DKA and will transition from insulin drip back onto her home insulin pump with the assistance of endocrine.     #Neuro  - no acute problems 	    #Pulm   - saturating well on 2L NC, no issues    #Cardiac  - hemodynamically stable  - EKG from admission (9/16) showed dynamic T wave changes, resolved on monitor, will repeat EKG today   - CE 0.10 trop x2 and third trop 0.14, will repeat one more set with 2pm labs and start heparin drip if + for NSTEMI  - complained of chest pain on admission, now resolved, CTA negative  - on home aspirin, plavix, and atorvastatin   - BP within normal limits, holding home BP meds including PO hydralazine, lisinopril, and metoprolol    #GI  - on DASH diet    #Renal   - nephro on board, s/p HD last night (9/16), removed 2L, next dialysis on Tuesday  - last BMP with AG at 14, negative acetone, and HCO3 29, f/u BMP at 2pm  - restarted sevelamer, holding cinacalcet    #Endocrine:  - pump restarted at 3:30pm and insulin drip shut off at 5:30pm  - FS before meals and at bedtime   - repeat BMP, serum acetone and VBG at 6pm --> based on results will switch to daily labs      #MSK   - restarted home percocet and Cymbalta for pain    #PPX  - heparin subq for DVT          For Followup:          Vital Signs Last 24 Hrs  T(C): 37.1 (17 Sep 2017 16:00), Max: 37.1 (17 Sep 2017 08:00)  T(F): 98.8 (17 Sep 2017 16:00), Max: 98.8 (17 Sep 2017 08:00)  HR: 79 (17 Sep 2017 17:00) (70 - 90)  BP: 164/67 (17 Sep 2017 17:00) (96/51 - 164/67)  BP(mean): 97 (17 Sep 2017 17:00) (71 - 100)  RR: 25 (17 Sep 2017 17:00) (12 - 56)  SpO2: 100% (17 Sep 2017 17:00) (91% - 100%)  I&O's Summary    16 Sep 2017 07:01  -  17 Sep 2017 07:00  --------------------------------------------------------  IN: 1225.5 mL / OUT: 2900 mL / NET: -1674.5 mL    17 Sep 2017 07:01  -  17 Sep 2017 17:37  --------------------------------------------------------  IN: 428 mL / OUT: 0 mL / NET: 428 mL        MEDICATIONS  (STANDING):  aspirin enteric coated 81 milliGRAM(s) Oral daily  insulin Infusion 2 Unit(s)/Hr (2 mL/Hr) IV Continuous <Continuous>  heparin  Injectable 5000 Unit(s) SubCutaneous every 12 hours  atorvastatin 20 milliGRAM(s) Oral at bedtime  clopidogrel Tablet 75 milliGRAM(s) Oral at bedtime  influenza   Vaccine 0.5 milliLiter(s) IntraMuscular once  dextrose 50% Injectable 25 milliLiter(s) IV Push once  dextrose 20%. 500 milliLiter(s) (40 mL/Hr) IV Continuous <Continuous>  dextrose 5%. 1000 milliLiter(s) (50 mL/Hr) IV Continuous <Continuous>  dextrose 50% Injectable 12.5 Gram(s) IV Push once  dextrose 50% Injectable 25 Gram(s) IV Push once  dextrose 50% Injectable 25 Gram(s) IV Push once  insulin lispro (HumaLOG) Pump 1 Each SubCutaneous Continuous Pump  DULoxetine 60 milliGRAM(s) Oral daily  sevelamer hydrochloride 2400 milliGRAM(s) Oral three times a day with meals    MEDICATIONS  (PRN):  dextrose Gel 1 Dose(s) Oral once PRN Blood Glucose LESS THAN 70 milliGRAM(s)/deciLiter  glucagon  Injectable 1 milliGRAM(s) IntraMuscular once PRN Glucose <70 milliGRAM(s)/deciLiter  oxyCODONE    5 mG/acetaminophen 325 mG 1 Tablet(s) Oral at bedtime PRN Severe Pain (7 - 10)        LABS                                            9.6                   Neurophils% (auto):   76.6   (09-17 @ 02:37):    6.6  )-----------(239          Lymphocytes% (auto):  12.1                                          28.9                   Eosinphils% (auto):   0.9      Manual%: Neutrophils x    ; Lymphocytes x    ; Eosinophils x    ; Bands%: x    ; Blasts x                                    133    |  89     |  18                  Calcium: 8.3   / iCa: x      (09-17 @ 14:33)    ----------------------------<  208       Magnesium: 2.2                              4.9     |  27     |  4.38             Phosphorous: 4.1      TPro  6.5    /  Alb  3.6    /  TBili  0.3    /  DBili  x      /  AST  22     /  ALT  12     /  AlkPhos  198    17 Sep 2017 02:37    ( 09-17 @ 02:37 )   PT: 10.6 sec;   INR: 0.98 ratio  aPTT: 21.7 sec MICU Transfer Note    Transfer from: MICU    Transfer to: ( X ) Medicine    (  ) Telemetry     (   ) RCU        (    ) Palliative         (   ) Stroke Unit          (   ) __________________    Accepting physician: Dr. Viera      MICU COURSE:    Patient was       ASSESSMENT & PLAN:     62Fw/ PMHx T1DM on insulin pump, HTN, HLD, former smoker, MI, CAD s/p PCI to RCA and brachytherapy in Aug 2017, dCHF (last TTE- July 2017- mild MR, mild AS, mild-mod TR EF65%) chronic LLE pain and edema in setting of severe PVD s/p L & R fem-pop bypass  graft,  multiple vascular surgery both leg w/ fem-pop bypass revisions , Subclavian vein stenosis left s/p stent, ESRD on HD (Tu/Thr/Sat) via L AVF with oliguria, CVA with memory deficit, depression, chronic pain management for LLE on oxycodone, multiple prior admissions to hospital for hyperglycemia,  who presented to the ED with acute onset CP with SOB, found to have hyperkalemia, DKA and dynamic T wave changes. Patient now with resolved DKA and will transition from insulin drip back onto her home insulin pump with the assistance of endocrine.     #Neuro  - no acute problems 	    #Pulm   - saturating well on 2L NC, no issues    #Cardiac  - hemodynamically stable  - EKG from admission (9/16) showed dynamic T wave changes, resolved on monitor, will repeat EKG today   - CE 0.10 trop x2, third trop 0.14, fourth 0.17 --> will start heparin drip if EKG + for NSTEMI  - complained of chest pain on admission, now resolved, CTA negative  - on home aspirin, plavix, and atorvastatin   - BP within normal limits, holding home BP meds including PO hydralazine, lisinopril, and metoprolol    #GI  - on DASH diet    #Renal   - nephro on board, s/p HD last night (9/16), removed 2L, next dialysis on Tuesday  - last BMP with AG at 14, negative acetone, and HCO3 29, f/u BMP at 2pm  - restarted sevelamer, holding cinacalcet    #Endocrine:  - pump restarted at 3:30pm and insulin drip shut off at 5:30pm  - FS before meals and at bedtime   - repeat BMP, serum acetone and VBG at 6pm --> based on results will switch to daily labs      #MSK   - restarted home percocet and Cymbalta for pain    #PPX  - heparin subq for DVT          For Followup:          Vital Signs Last 24 Hrs  T(C): 37.1 (17 Sep 2017 16:00), Max: 37.1 (17 Sep 2017 08:00)  T(F): 98.8 (17 Sep 2017 16:00), Max: 98.8 (17 Sep 2017 08:00)  HR: 79 (17 Sep 2017 17:00) (70 - 90)  BP: 164/67 (17 Sep 2017 17:00) (96/51 - 164/67)  BP(mean): 97 (17 Sep 2017 17:00) (71 - 100)  RR: 25 (17 Sep 2017 17:00) (12 - 56)  SpO2: 100% (17 Sep 2017 17:00) (91% - 100%)  I&O's Summary    16 Sep 2017 07:01  -  17 Sep 2017 07:00  --------------------------------------------------------  IN: 1225.5 mL / OUT: 2900 mL / NET: -1674.5 mL    17 Sep 2017 07:01  -  17 Sep 2017 17:37  --------------------------------------------------------  IN: 428 mL / OUT: 0 mL / NET: 428 mL        MEDICATIONS  (STANDING):  aspirin enteric coated 81 milliGRAM(s) Oral daily  insulin Infusion 2 Unit(s)/Hr (2 mL/Hr) IV Continuous <Continuous>  heparin  Injectable 5000 Unit(s) SubCutaneous every 12 hours  atorvastatin 20 milliGRAM(s) Oral at bedtime  clopidogrel Tablet 75 milliGRAM(s) Oral at bedtime  influenza   Vaccine 0.5 milliLiter(s) IntraMuscular once  dextrose 50% Injectable 25 milliLiter(s) IV Push once  dextrose 20%. 500 milliLiter(s) (40 mL/Hr) IV Continuous <Continuous>  dextrose 5%. 1000 milliLiter(s) (50 mL/Hr) IV Continuous <Continuous>  dextrose 50% Injectable 12.5 Gram(s) IV Push once  dextrose 50% Injectable 25 Gram(s) IV Push once  dextrose 50% Injectable 25 Gram(s) IV Push once  insulin lispro (HumaLOG) Pump 1 Each SubCutaneous Continuous Pump  DULoxetine 60 milliGRAM(s) Oral daily  sevelamer hydrochloride 2400 milliGRAM(s) Oral three times a day with meals    MEDICATIONS  (PRN):  dextrose Gel 1 Dose(s) Oral once PRN Blood Glucose LESS THAN 70 milliGRAM(s)/deciLiter  glucagon  Injectable 1 milliGRAM(s) IntraMuscular once PRN Glucose <70 milliGRAM(s)/deciLiter  oxyCODONE    5 mG/acetaminophen 325 mG 1 Tablet(s) Oral at bedtime PRN Severe Pain (7 - 10)        LABS                                            9.6                   Neurophils% (auto):   76.6   (09-17 @ 02:37):    6.6  )-----------(239          Lymphocytes% (auto):  12.1                                          28.9                   Eosinphils% (auto):   0.9      Manual%: Neutrophils x    ; Lymphocytes x    ; Eosinophils x    ; Bands%: x    ; Blasts x                                    133    |  89     |  18                  Calcium: 8.3   / iCa: x      (09-17 @ 14:33)    ----------------------------<  208       Magnesium: 2.2                              4.9     |  27     |  4.38             Phosphorous: 4.1      TPro  6.5    /  Alb  3.6    /  TBili  0.3    /  DBili  x      /  AST  22     /  ALT  12     /  AlkPhos  198    17 Sep 2017 02:37    ( 09-17 @ 02:37 )   PT: 10.6 sec;   INR: 0.98 ratio  aPTT: 21.7 sec MICU Transfer Note    Transfer from: MICU    Transfer to: ( X ) Medicine    (  ) Telemetry     (   ) RCU        (    ) Palliative         (   ) Stroke Unit          (   ) __________________    Accepting physician: Dr. Viera      MICU COURSE:    Patient is a 62 F with PMHx of T1DM on insulin pump, HTN, HLD, former smoker, MI, CAD s/p PCI to RCA and brachytherapy in Aug 2017, dCHF (last TTE- July 2017- mild MR, mild AS, mild-mod TR EF65%) chronic LLE pain and edema in setting of severe PVD s/p L & R fem-pop bypass  graft,  multiple vascular surgery both leg w/ fem-pop bypass revisions , Subclavian vein stenosis left s/p stent, ESRD on HD (Tu/Thr/Sat) via L AVF with oliguria, CVA with memory deficit, depression, chronic pain management for LLE on oxycodone, multiple prior admissions to hospital for hyperglycemia,  who presented to ED on 9/16 with complaint of acute onset CP with SOB. Pt had been at baseline state of health day prior to admission. On day of presentation, she had acute episode of CP similar to that experienced during her prior MI, with radiation to the back, and acute SOB while ambulating to the car. Symptoms were associated with lightheadedness, and blurry vision. She denied any fevers, chills, cough, or dysuria.  Pt's sxs improved with lying supine after taking baby aspirin, and additional 2 baby aspirin provided by EMS. Upon arriving at the ED, pt experienced nausea and nonbloody emesis. Pt uses insulin pump, which she reported had been working without problems. Her fingerstick BS had been 180 earlier in the day.     In ED VS HR 72, /45, RR 25, SpO2 100% on 2LNC  In ED, patient was given aspirin 81mg PO, regular insulin 5 units IV, zofran 4mg IVP x1, albuterol neb x1, and started on insulin gtt at 6units/hr. Pt's repeat fingerstick BS of 447.     She was transferred to the MICU on the insulin drip at 6U. During the night, the drip was lowered but she developed hypoglycemia and she was started on D20 that was titrated throughout the night. Her gap has closed but had re-opened to 22. The next morning, her drip was at 2U and D20 was at 20 but was titrated up to 40U for dropping sugars from 200 to 106. Endocrine was consulted and restarted her pump. The pump overlapped with the insulin drip at 1U and was shut off 2 hours after the pump was re-instated. The patient was given a diet and the d20 was titrated off. Repeat labs showed a closed gap with improved bicarb and The patient was then deemed stable for transfer to the floors as her DKA had resolved and she was back on her home insulin pump settings.       ASSESSMENT & PLAN:     62Fw/ PMHx T1DM on insulin pump, HTN, HLD, former smoker, MI, CAD s/p PCI to RCA and brachytherapy in Aug 2017, dCHF (last TTE- July 2017- mild MR, mild AS, mild-mod TR EF65%) chronic LLE pain and edema in setting of severe PVD s/p L & R fem-pop bypass  graft,  multiple vascular surgery both leg w/ fem-pop bypass revisions , Subclavian vein stenosis left s/p stent, ESRD on HD (Tu/Thr/Sat) via L AVF with oliguria, CVA with memory deficit, depression, chronic pain management for LLE on oxycodone, multiple prior admissions to hospital for hyperglycemia,  who presented to the ED with acute onset CP with SOB, found to have hyperkalemia, DKA and dynamic T wave changes. Patient now with resolved DKA and will transition from insulin drip back onto her home insulin pump with the assistance of endocrine.     #Neuro  - no acute problems 	    #Pulm   - saturating well on 2L NC, no issues    #Cardiac  - hemodynamically stable  - EKG from admission (9/16) showed dynamic T wave changes, resolved on monitor, repeat EKG without any acute ST changes, unchanged from previous   - CE 0.10 trop x2, third trop 0.14, fourth 0.17 --> since EKG with no changes, most likely 2/2 to her ESRD  - complained of chest pain on admission, now resolved, CTA negative  - on home aspirin, plavix, and atorvastatin   - BP within normal limits, holding home BP meds including PO hydralazine, lisinopril, and metoprolol    #GI  - on DASH diet    #Renal   - nephro on board, s/p HD last night (9/16), removed 2L, next dialysis on Tuesday  - last BMP with AG at 14, negative acetone, and HCO3 29, f/u BMP at 2pm  - restarted sevelamer, holding cinacalcet    #Endocrine:  - pump restarted at 3:30pm and insulin drip shut off at 5:30pm  - FS before meals and at bedtime   - repeat BMP, serum acetone and VBG at 6pm --> based on results will switch to daily labs      #MSK   - restarted home percocet and Cymbalta for pain    #PPX  - heparin subq for DVT          For Followup:          Vital Signs Last 24 Hrs  T(C): 37.1 (17 Sep 2017 16:00), Max: 37.1 (17 Sep 2017 08:00)  T(F): 98.8 (17 Sep 2017 16:00), Max: 98.8 (17 Sep 2017 08:00)  HR: 79 (17 Sep 2017 17:00) (70 - 90)  BP: 164/67 (17 Sep 2017 17:00) (96/51 - 164/67)  BP(mean): 97 (17 Sep 2017 17:00) (71 - 100)  RR: 25 (17 Sep 2017 17:00) (12 - 56)  SpO2: 100% (17 Sep 2017 17:00) (91% - 100%)  I&O's Summary    16 Sep 2017 07:01  -  17 Sep 2017 07:00  --------------------------------------------------------  IN: 1225.5 mL / OUT: 2900 mL / NET: -1674.5 mL    17 Sep 2017 07:01  -  17 Sep 2017 17:37  --------------------------------------------------------  IN: 428 mL / OUT: 0 mL / NET: 428 mL        MEDICATIONS  (STANDING):  aspirin enteric coated 81 milliGRAM(s) Oral daily  insulin Infusion 2 Unit(s)/Hr (2 mL/Hr) IV Continuous <Continuous>  heparin  Injectable 5000 Unit(s) SubCutaneous every 12 hours  atorvastatin 20 milliGRAM(s) Oral at bedtime  clopidogrel Tablet 75 milliGRAM(s) Oral at bedtime  influenza   Vaccine 0.5 milliLiter(s) IntraMuscular once  dextrose 50% Injectable 25 milliLiter(s) IV Push once  dextrose 20%. 500 milliLiter(s) (40 mL/Hr) IV Continuous <Continuous>  dextrose 5%. 1000 milliLiter(s) (50 mL/Hr) IV Continuous <Continuous>  dextrose 50% Injectable 12.5 Gram(s) IV Push once  dextrose 50% Injectable 25 Gram(s) IV Push once  dextrose 50% Injectable 25 Gram(s) IV Push once  insulin lispro (HumaLOG) Pump 1 Each SubCutaneous Continuous Pump  DULoxetine 60 milliGRAM(s) Oral daily  sevelamer hydrochloride 2400 milliGRAM(s) Oral three times a day with meals    MEDICATIONS  (PRN):  dextrose Gel 1 Dose(s) Oral once PRN Blood Glucose LESS THAN 70 milliGRAM(s)/deciLiter  glucagon  Injectable 1 milliGRAM(s) IntraMuscular once PRN Glucose <70 milliGRAM(s)/deciLiter  oxyCODONE    5 mG/acetaminophen 325 mG 1 Tablet(s) Oral at bedtime PRN Severe Pain (7 - 10)        LABS                                            9.6                   Neurophils% (auto):   76.6   (09-17 @ 02:37):    6.6  )-----------(239          Lymphocytes% (auto):  12.1                                          28.9                   Eosinphils% (auto):   0.9      Manual%: Neutrophils x    ; Lymphocytes x    ; Eosinophils x    ; Bands%: x    ; Blasts x                                    133    |  89     |  18                  Calcium: 8.3   / iCa: x      (09-17 @ 14:33)    ----------------------------<  208       Magnesium: 2.2                              4.9     |  27     |  4.38             Phosphorous: 4.1      TPro  6.5    /  Alb  3.6    /  TBili  0.3    /  DBili  x      /  AST  22     /  ALT  12     /  AlkPhos  198    17 Sep 2017 02:37    ( 09-17 @ 02:37 )   PT: 10.6 sec;   INR: 0.98 ratio  aPTT: 21.7 sec MICU Transfer Note    Transfer from: MICU    Transfer to: ( X ) Medicine    (  ) Telemetry     (   ) RCU        (    ) Palliative         (   ) Stroke Unit          (   ) __________________    Accepting physician: Dr. Viera      MICU COURSE:    Patient is a 62 F with PMHx of T1DM on insulin pump, HTN, HLD, former smoker, MI, CAD s/p PCI to RCA and brachytherapy in Aug 2017, dCHF (last TTE- July 2017- mild MR, mild AS, mild-mod TR EF65%) chronic LLE pain and edema in setting of severe PVD s/p L & R fem-pop bypass  graft,  multiple vascular surgery both leg w/ fem-pop bypass revisions , Subclavian vein stenosis left s/p stent, ESRD on HD (Tu/Thr/Sat) via L AVF with oliguria, CVA with memory deficit, depression, chronic pain management for LLE on oxycodone, multiple prior admissions to hospital for hyperglycemia,  who presented to ED on 9/16 with complaint of acute onset CP with SOB. Pt had been at baseline state of health day prior to admission. On day of presentation, she had acute episode of CP similar to that experienced during her prior MI, with radiation to the back, and acute SOB while ambulating to the car. Symptoms were associated with lightheadedness, and blurry vision. She denied any fevers, chills, cough, or dysuria.  Pt's sxs improved with lying supine after taking baby aspirin, and additional 2 baby aspirin provided by EMS. Upon arriving at the ED, pt experienced nausea and nonbloody emesis. Pt uses insulin pump, which she reported had been working without problems. Her fingerstick BS had been 180 earlier in the day.     In ED VS HR 72, /45, RR 25, SpO2 100% on 2LNC  In ED, patient was given aspirin 81mg PO, regular insulin 5 units IV, zofran 4mg IVP x1, albuterol neb x1, and started on insulin gtt at 6units/hr. Pt's repeat fingerstick BS of 447.     She was transferred to the MICU on the insulin drip at 6U. During the night, the drip was lowered but she developed hypoglycemia and she was started on D20 that was titrated throughout the night. Her gap has closed but had re-opened to 22. The next morning, her drip was at 2U and D20 was at 20 but was titrated up to 40U for dropping sugars from 200 to 106. Endocrine was consulted and restarted her pump. The pump overlapped with the insulin drip at 1U and was shut off 2 hours after the pump was re-instated. The patient was given a diet and the d20 was titrated off. Repeat labs showed a closed gap with improved bicarb and The patient was then deemed stable for transfer to the floors as her DKA had resolved and she was back on her home insulin pump settings.     CE were trended given patient's complaints of chest pain on admission --> 0.10 --> 0.10 --> 0.14 --> 0.17, most likely 2/2 to ESRD, EKG x2 with no acute ST changes. Chest pain improved upon arrival in MICU.      ASSESSMENT & PLAN:     62Fw/ PMHx T1DM on insulin pump, HTN, HLD, former smoker, MI, CAD s/p PCI to RCA and brachytherapy in Aug 2017, dCHF (last TTE- July 2017- mild MR, mild AS, mild-mod TR EF65%) chronic LLE pain and edema in setting of severe PVD s/p L & R fem-pop bypass  graft,  multiple vascular surgery both leg w/ fem-pop bypass revisions , Subclavian vein stenosis left s/p stent, ESRD on HD (Tu/Thr/Sat) via L AVF with oliguria, CVA with memory deficit, depression, chronic pain management for LLE on oxycodone, multiple prior admissions to hospital for hyperglycemia,  who presented to the ED with acute onset CP with SOB, found to have hyperkalemia, DKA and dynamic T wave changes. Patient now with resolved DKA and will transition from insulin drip back onto her home insulin pump with the assistance of endocrine.     #Neuro  - no acute problems 	    #Pulm   - saturating well on 2L NC, no issues    #Cardiac  - hemodynamically stable  - EKG from admission (9/16) showed dynamic T wave changes, resolved on monitor, repeat EKG without any acute ST changes, unchanged from previous   - CE 0.10 trop x2, third trop 0.14, fourth 0.17 --> since EKG with no changes, most likely 2/2 to her ESRD  - complained of chest pain on admission, now resolved, CTA negative  - on home aspirin, plavix, and atorvastatin   - BP within normal limits, holding home BP meds including PO hydralazine, lisinopril, and metoprolol    #GI  - on DASH diet    #Renal   - nephro on board, s/p HD last night (9/16), removed 2L, next dialysis on Tuesday  - last BMP with AG at 14, negative acetone, and HCO3 29, f/u BMP at 2pm  - restarted sevelamer, holding cinacalcet    #Endocrine:  - pump restarted at 3:30pm and insulin drip shut off at 5:30pm  - FS before meals and at bedtime   - repeat BMP, serum acetone and VBG at 6pm --> based on results will switch to daily labs      #MSK   - restarted home percocet and Cymbalta for pain    #PPX  - heparin subq for DVT          For Followup:          Vital Signs Last 24 Hrs  T(C): 37.1 (17 Sep 2017 16:00), Max: 37.1 (17 Sep 2017 08:00)  T(F): 98.8 (17 Sep 2017 16:00), Max: 98.8 (17 Sep 2017 08:00)  HR: 79 (17 Sep 2017 17:00) (70 - 90)  BP: 164/67 (17 Sep 2017 17:00) (96/51 - 164/67)  BP(mean): 97 (17 Sep 2017 17:00) (71 - 100)  RR: 25 (17 Sep 2017 17:00) (12 - 56)  SpO2: 100% (17 Sep 2017 17:00) (91% - 100%)  I&O's Summary    16 Sep 2017 07:01  -  17 Sep 2017 07:00  --------------------------------------------------------  IN: 1225.5 mL / OUT: 2900 mL / NET: -1674.5 mL    17 Sep 2017 07:01  -  17 Sep 2017 17:37  --------------------------------------------------------  IN: 428 mL / OUT: 0 mL / NET: 428 mL        MEDICATIONS  (STANDING):  aspirin enteric coated 81 milliGRAM(s) Oral daily  insulin Infusion 2 Unit(s)/Hr (2 mL/Hr) IV Continuous <Continuous>  heparin  Injectable 5000 Unit(s) SubCutaneous every 12 hours  atorvastatin 20 milliGRAM(s) Oral at bedtime  clopidogrel Tablet 75 milliGRAM(s) Oral at bedtime  influenza   Vaccine 0.5 milliLiter(s) IntraMuscular once  dextrose 50% Injectable 25 milliLiter(s) IV Push once  dextrose 20%. 500 milliLiter(s) (40 mL/Hr) IV Continuous <Continuous>  dextrose 5%. 1000 milliLiter(s) (50 mL/Hr) IV Continuous <Continuous>  dextrose 50% Injectable 12.5 Gram(s) IV Push once  dextrose 50% Injectable 25 Gram(s) IV Push once  dextrose 50% Injectable 25 Gram(s) IV Push once  insulin lispro (HumaLOG) Pump 1 Each SubCutaneous Continuous Pump  DULoxetine 60 milliGRAM(s) Oral daily  sevelamer hydrochloride 2400 milliGRAM(s) Oral three times a day with meals    MEDICATIONS  (PRN):  dextrose Gel 1 Dose(s) Oral once PRN Blood Glucose LESS THAN 70 milliGRAM(s)/deciLiter  glucagon  Injectable 1 milliGRAM(s) IntraMuscular once PRN Glucose <70 milliGRAM(s)/deciLiter  oxyCODONE    5 mG/acetaminophen 325 mG 1 Tablet(s) Oral at bedtime PRN Severe Pain (7 - 10)        LABS                                            9.6                   Neurophils% (auto):   76.6   (09-17 @ 02:37):    6.6  )-----------(239          Lymphocytes% (auto):  12.1                                          28.9                   Eosinphils% (auto):   0.9      Manual%: Neutrophils x    ; Lymphocytes x    ; Eosinophils x    ; Bands%: x    ; Blasts x                                    133    |  89     |  18                  Calcium: 8.3   / iCa: x      (09-17 @ 14:33)    ----------------------------<  208       Magnesium: 2.2                              4.9     |  27     |  4.38             Phosphorous: 4.1      TPro  6.5    /  Alb  3.6    /  TBili  0.3    /  DBili  x      /  AST  22     /  ALT  12     /  AlkPhos  198    17 Sep 2017 02:37    ( 09-17 @ 02:37 )   PT: 10.6 sec;   INR: 0.98 ratio  aPTT: 21.7 sec MICU Transfer Note    Transfer from: MICU    Transfer to: ( X ) Medicine    (  ) Telemetry     (   ) RCU        (    ) Palliative         (   ) Stroke Unit          (   ) __________________    Accepting physician: Dr. Viera      MICU COURSE:    Patient is a 62 F with PMHx of T1DM on insulin pump, HTN, HLD, former smoker, MI, CAD s/p PCI to RCA and brachytherapy in Aug 2017, dCHF (last TTE- July 2017- mild MR, mild AS, mild-mod TR EF65%) chronic LLE pain and edema in setting of severe PVD s/p L & R fem-pop bypass  graft,  multiple vascular surgery both leg w/ fem-pop bypass revisions , Subclavian vein stenosis left s/p stent, ESRD on HD (Tu/Thr/Sat) via L AVF with oliguria, CVA with memory deficit, depression, chronic pain management for LLE on oxycodone, multiple prior admissions to hospital for hyperglycemia,  who presented to ED on 9/16 with complaint of acute onset CP with SOB. Pt had been at baseline state of health day prior to admission. On day of presentation, she had acute episode of CP similar to that experienced during her prior MI, with radiation to the back, and acute SOB while ambulating to the car. Symptoms were associated with lightheadedness, and blurry vision. She denied any fevers, chills, cough, or dysuria.  Pt's sxs improved with lying supine after taking baby aspirin, and additional 2 baby aspirin provided by EMS. Upon arriving at the ED, pt experienced nausea and nonbloody emesis. Pt uses insulin pump, which she reported had been working without problems. Her fingerstick BS had been 180 earlier in the day.     In ED VS HR 72, /45, RR 25, SpO2 100% on 2LNC  In ED, patient was given aspirin 81mg PO, regular insulin 5 units IV, zofran 4mg IVP x1, albuterol neb x1, and started on insulin gtt at 6units/hr. Pt's repeat fingerstick BS of 447.     She was transferred to the MICU on the insulin drip at 6U. During the night, the drip was lowered but she developed hypoglycemia and she was started on D20 that was titrated throughout the night. Her gap has closed but had re-opened to 22. The next morning, her drip was at 2U and D20 was at 20 but was titrated up to 40U for dropping sugars from 200 to 106. Endocrine was consulted and restarted her pump. The pump overlapped with the insulin drip at 1U and was shut off 2 hours after the pump was re-instated. The patient was given a diet and the d20 was titrated off. Repeat labs showed a closed gap with improved bicarb and the patient was then deemed stable for transfer to the floors as her DKA had resolved and she was back on her home insulin pump settings.     CE were trended given patient's complaints of chest pain on admission --> 0.10 --> 0.10 --> 0.14 --> 0.17, most likely 2/2 to ESRD, EKG x2 with no acute ST changes. Chest pain improved upon arrival in MICU.    **Last night patient went back into DKA because her pump disconnected/was not attached appropriately. She was put back on a insulin drip at 2U and titrated down to 1U. Endocrine evaluated her in the morning and put her pump back on for her and discontinued her insulin drip. Repeat BMP showed gap remained closed and patient deemed stable for transfer to floors.       ASSESSMENT & PLAN:     62 F with a PMHx T1DM on insulin pump, HTN, HLD, former smoker, MI, CAD s/p PCI to RCA and brachytherapy in Aug 2017, dCHF (last TTE- July 2017- mild MR, mild AS, mild-mod TR EF65%) chronic LLE pain and edema in setting of severe PVD s/p L & R fem-pop bypass  graft,  multiple vascular surgery both leg w/ fem-pop bypass revisions , Subclavian vein stenosis left s/p stent, ESRD on HD (Tu/Thr/Sat) via L AVF with oliguria, CVA with memory deficit, depression, chronic pain management for LLE on oxycodone, multiple prior admissions to hospital for hyperglycemia,  who presented to the ED with chest pain and was admitted for management of DKA. DKA resolved yesterday during the day but patient went back into DKA overnight due to pump malfunction/disconnection. Pump re-started this morning under endocrine supervision.       #Neuro  - no acute problems 	      #Pulm   - saturating well on 2L NC, no issues      #Cardiac  - hemodynamically stable  - complained of chest pain on admission, now resolved, CTA negative, EKG with no acute ST changes, CE slightly elevated but likely 2/2 to ESRD  - on home aspirin, plavix, and atorvastatin   - restarted home BP meds including PO hydralazine, lisinopril, and metoprolol       #GI  - on DASH/renal diet      #Renal   - nephro on board, s/p HD (9/16), removed 2L, next dialysis on Tuesday  - last BMP this AM with AG of 17 and HCO3 27, f/u BMP at 2pm  - restarted home sevelamer and cinacalcet      #Endocrine:  - on insulin pump on home settings  - after 2pm BMP, can be transferred to floors if gap remains closed   - FS before meals and at bedtime         #MSK  - restarted home percocet at bedtime for pain  - on home cymbalta      #PPX  - heparin subq for DVT        For Followup:        Vital Signs Last 24 Hrs  T(C): 37.1 (17 Sep 2017 16:00), Max: 37.1 (17 Sep 2017 08:00)  T(F): 98.8 (17 Sep 2017 16:00), Max: 98.8 (17 Sep 2017 08:00)  HR: 79 (17 Sep 2017 17:00) (70 - 90)  BP: 164/67 (17 Sep 2017 17:00) (96/51 - 164/67)  BP(mean): 97 (17 Sep 2017 17:00) (71 - 100)  RR: 25 (17 Sep 2017 17:00) (12 - 56)  SpO2: 100% (17 Sep 2017 17:00) (91% - 100%)  I&O's Summary    16 Sep 2017 07:01  -  17 Sep 2017 07:00  --------------------------------------------------------  IN: 1225.5 mL / OUT: 2900 mL / NET: -1674.5 mL    17 Sep 2017 07:01  -  17 Sep 2017 17:37  --------------------------------------------------------  IN: 428 mL / OUT: 0 mL / NET: 428 mL        MEDICATIONS  (STANDING):  aspirin enteric coated 81 milliGRAM(s) Oral daily  insulin Infusion 2 Unit(s)/Hr (2 mL/Hr) IV Continuous <Continuous>  heparin  Injectable 5000 Unit(s) SubCutaneous every 12 hours  atorvastatin 20 milliGRAM(s) Oral at bedtime  clopidogrel Tablet 75 milliGRAM(s) Oral at bedtime  influenza   Vaccine 0.5 milliLiter(s) IntraMuscular once  dextrose 50% Injectable 25 milliLiter(s) IV Push once  dextrose 20%. 500 milliLiter(s) (40 mL/Hr) IV Continuous <Continuous>  dextrose 5%. 1000 milliLiter(s) (50 mL/Hr) IV Continuous <Continuous>  dextrose 50% Injectable 12.5 Gram(s) IV Push once  dextrose 50% Injectable 25 Gram(s) IV Push once  dextrose 50% Injectable 25 Gram(s) IV Push once  insulin lispro (HumaLOG) Pump 1 Each SubCutaneous Continuous Pump  DULoxetine 60 milliGRAM(s) Oral daily  sevelamer hydrochloride 2400 milliGRAM(s) Oral three times a day with meals    MEDICATIONS  (PRN):  dextrose Gel 1 Dose(s) Oral once PRN Blood Glucose LESS THAN 70 milliGRAM(s)/deciLiter  glucagon  Injectable 1 milliGRAM(s) IntraMuscular once PRN Glucose <70 milliGRAM(s)/deciLiter  oxyCODONE    5 mG/acetaminophen 325 mG 1 Tablet(s) Oral at bedtime PRN Severe Pain (7 - 10)        LABS                                            9.6                   Neurophils% (auto):   76.6   (09-17 @ 02:37):    6.6  )-----------(239          Lymphocytes% (auto):  12.1                                          28.9                   Eosinphils% (auto):   0.9      Manual%: Neutrophils x    ; Lymphocytes x    ; Eosinophils x    ; Bands%: x    ; Blasts x                                    133    |  89     |  18                  Calcium: 8.3   / iCa: x      (09-17 @ 14:33)    ----------------------------<  208       Magnesium: 2.2                              4.9     |  27     |  4.38             Phosphorous: 4.1      TPro  6.5    /  Alb  3.6    /  TBili  0.3    /  DBili  x      /  AST  22     /  ALT  12     /  AlkPhos  198    17 Sep 2017 02:37    ( 09-17 @ 02:37 )   PT: 10.6 sec;   INR: 0.98 ratio  aPTT: 21.7 sec

## 2017-09-17 NOTE — PROGRESS NOTE ADULT - PROBLEM SELECTOR PLAN 2
Restarted on pump today with settings as indicated above  Recommended fill out insulin pump order forms and instructed MICU team how to order pump with settings.  Next pump site change 9/20/17, pt. has supplies

## 2017-09-17 NOTE — PROGRESS NOTE ADULT - SUBJECTIVE AND OBJECTIVE BOX
[facility]   [phone]  RENAL FOLLOW UP NOTE  --------------------------------------------------------------------------------  Chief Complaint:    24 hour events/subjective:    states still feel tired. no abd pain     PAST HISTORY  --------------------------------------------------------------------------------  No significant changes to PMH, PSH, FHx, SHx, unless otherwise noted    ALLERGIES & MEDICATIONS  --------------------------------------------------------------------------------  Allergies    No Known Allergies    Intolerances      Standing Inpatient Medications  aspirin enteric coated 81 milliGRAM(s) Oral daily  insulin Infusion 2 Unit(s)/Hr IV Continuous <Continuous>  heparin  Injectable 5000 Unit(s) SubCutaneous every 12 hours  atorvastatin 20 milliGRAM(s) Oral at bedtime  clopidogrel Tablet 75 milliGRAM(s) Oral at bedtime  influenza   Vaccine 0.5 milliLiter(s) IntraMuscular once  dextrose 50% Injectable 25 milliLiter(s) IV Push once  dextrose 20%. 500 milliLiter(s) IV Continuous <Continuous>  dextrose 5%. 1000 milliLiter(s) IV Continuous <Continuous>  dextrose 50% Injectable 12.5 Gram(s) IV Push once  dextrose 50% Injectable 25 Gram(s) IV Push once  dextrose 50% Injectable 25 Gram(s) IV Push once  insulin lispro (HumaLOG) Pump 1 Each SubCutaneous Continuous Pump    PRN Inpatient Medications  dextrose Gel 1 Dose(s) Oral once PRN  glucagon  Injectable 1 milliGRAM(s) IntraMuscular once PRN      REVIEW OF SYSTEMS  --------------------------------------------------------------------------------    Gen: deniesfevers/chills,  CVS: denies chest pain/palpitations  Resp: denies SOB/Cough  GI: Denies N/V/Abd pain  : Denies dysuria  All other systems were reviewed and are negative, except as noted.    VITALS/PHYSICAL EXAM  --------------------------------------------------------------------------------  T(C): 37.1 (09-17-17 @ 08:00), Max: 37.1 (09-16-17 @ 15:45)  HR: 71 (09-17-17 @ 12:00) (71 - 92)  BP: 130/58 (09-17-17 @ 12:00) (96/51 - 151/56)  RR: 14 (09-17-17 @ 12:00) (12 - 29)  SpO2: 100% (09-17-17 @ 12:00) (91% - 100%)  Wt(kg): --  Height (cm): 162.56 (09-16-17 @ 15:45)  Weight (kg): 54.6 (09-16-17 @ 15:45)  BMI (kg/m2): 20.7 (09-16-17 @ 15:45)  BSA (m2): 1.58 (09-16-17 @ 15:45)      09-16-17 @ 07:01  -  09-17-17 @ 07:00  --------------------------------------------------------  IN: 1225.5 mL / OUT: 2900 mL / NET: -1674.5 mL    09-17-17 @ 07:01  -  09-17-17 @ 13:00  --------------------------------------------------------  IN: 142 mL / OUT: 0 mL / NET: 142 mL      Physical Exam:  	    Physical Exam:  	Gen: alert oriented place person and date   	Pulm: Decreased breath sounds b/l bases. no rales or ronchi or wheezing  	CV: RRR, S1/S2. no rub  	Back: No CVA tenderness; no sacral edema  	Abd: +BS, soft, nontender/nondistended  	: No suprapubic tenderness.               Extremity: No cyanosis, LLE  edema no clubbing  	  LABS/STUDIES  --------------------------------------------------------------------------------              9.6    6.6   >-----------<  239      [09-17-17 @ 02:37]              28.9     137  |  94  |  17  ----------------------------<  105      [09-17-17 @ 10:31]  4.6   |  29  |  4.10        Ca     7.8     [09-17-17 @ 10:31]      Mg     2.5     [09-17-17 @ 10:31]      Phos  4.0     [09-17-17 @ 10:31]    TPro  6.5  /  Alb  3.6  /  TBili  0.3  /  DBili  x   /  AST  22  /  ALT  12  /  AlkPhos  198  [09-17-17 @ 02:37]    PT/INR: PT 10.6 , INR 0.98       [09-17-17 @ 02:37]  PTT: 21.7       [09-17-17 @ 02:37]    Troponin 0.14      [09-17-17 @ 06:06]        [09-17-17 @ 06:06]    Creatinine Trend:  SCr 4.10 [09-17 @ 10:31]  SCr 3.77 [09-17 @ 06:06]  SCr 3.50 [09-17 @ 02:37]  SCr 2.84 [09-16 @ 22:20]  SCr 6.92 [09-16 @ 13:48]                  HbA1c 6.8      [07-24-17 @ 06:15]

## 2017-09-18 LAB
ACETONE SERPL-MCNC: NEGATIVE — SIGNIFICANT CHANGE UP
ALBUMIN SERPL ELPH-MCNC: 4 G/DL — SIGNIFICANT CHANGE UP (ref 3.3–5)
ALP SERPL-CCNC: 203 U/L — HIGH (ref 40–120)
ALT FLD-CCNC: 15 U/L RC — SIGNIFICANT CHANGE UP (ref 10–45)
ANION GAP SERPL CALC-SCNC: 17 MMOL/L — SIGNIFICANT CHANGE UP (ref 5–17)
ANION GAP SERPL CALC-SCNC: 17 MMOL/L — SIGNIFICANT CHANGE UP (ref 5–17)
ANION GAP SERPL CALC-SCNC: 18 MMOL/L — HIGH (ref 5–17)
ANION GAP SERPL CALC-SCNC: 25 MMOL/L — HIGH (ref 5–17)
APTT BLD: 28.4 SEC — SIGNIFICANT CHANGE UP (ref 27.5–37.4)
AST SERPL-CCNC: 18 U/L — SIGNIFICANT CHANGE UP (ref 10–40)
BASOPHILS # BLD AUTO: 0 K/UL — SIGNIFICANT CHANGE UP (ref 0–0.2)
BASOPHILS NFR BLD AUTO: 0.1 % — SIGNIFICANT CHANGE UP (ref 0–2)
BILIRUB SERPL-MCNC: 0.3 MG/DL — SIGNIFICANT CHANGE UP (ref 0.2–1.2)
BUN SERPL-MCNC: 31 MG/DL — HIGH (ref 7–23)
BUN SERPL-MCNC: 34 MG/DL — HIGH (ref 7–23)
CALCIUM SERPL-MCNC: 8.2 MG/DL — LOW (ref 8.4–10.5)
CALCIUM SERPL-MCNC: 8.3 MG/DL — LOW (ref 8.4–10.5)
CALCIUM SERPL-MCNC: 8.3 MG/DL — LOW (ref 8.4–10.5)
CALCIUM SERPL-MCNC: 8.5 MG/DL — SIGNIFICANT CHANGE UP (ref 8.4–10.5)
CHLORIDE SERPL-SCNC: 85 MMOL/L — LOW (ref 96–108)
CHLORIDE SERPL-SCNC: 89 MMOL/L — LOW (ref 96–108)
CHLORIDE SERPL-SCNC: 91 MMOL/L — LOW (ref 96–108)
CHLORIDE SERPL-SCNC: 92 MMOL/L — LOW (ref 96–108)
CO2 SERPL-SCNC: 22 MMOL/L — SIGNIFICANT CHANGE UP (ref 22–31)
CO2 SERPL-SCNC: 27 MMOL/L — SIGNIFICANT CHANGE UP (ref 22–31)
CO2 SERPL-SCNC: 27 MMOL/L — SIGNIFICANT CHANGE UP (ref 22–31)
CO2 SERPL-SCNC: 28 MMOL/L — SIGNIFICANT CHANGE UP (ref 22–31)
CREAT SERPL-MCNC: 5.41 MG/DL — HIGH (ref 0.5–1.3)
CREAT SERPL-MCNC: 5.78 MG/DL — HIGH (ref 0.5–1.3)
CREAT SERPL-MCNC: 6.21 MG/DL — HIGH (ref 0.5–1.3)
CREAT SERPL-MCNC: 6.67 MG/DL — HIGH (ref 0.5–1.3)
EOSINOPHIL # BLD AUTO: 0 K/UL — SIGNIFICANT CHANGE UP (ref 0–0.5)
EOSINOPHIL NFR BLD AUTO: 0.2 % — SIGNIFICANT CHANGE UP (ref 0–6)
GLUCOSE SERPL-MCNC: 151 MG/DL — HIGH (ref 70–99)
GLUCOSE SERPL-MCNC: 151 MG/DL — HIGH (ref 70–99)
GLUCOSE SERPL-MCNC: 441 MG/DL — HIGH (ref 70–99)
GLUCOSE SERPL-MCNC: 673 MG/DL — CRITICAL HIGH (ref 70–99)
GLUCOSE SERPL-MCNC: 96 MG/DL — SIGNIFICANT CHANGE UP (ref 70–99)
HCT VFR BLD CALC: 26.5 % — LOW (ref 34.5–45)
HGB BLD-MCNC: 8.9 G/DL — LOW (ref 11.5–15.5)
INR BLD: 1.07 RATIO — SIGNIFICANT CHANGE UP (ref 0.88–1.16)
LYMPHOCYTES # BLD AUTO: 0.6 K/UL — LOW (ref 1–3.3)
LYMPHOCYTES # BLD AUTO: 8.9 % — LOW (ref 13–44)
MAGNESIUM SERPL-MCNC: 2.2 MG/DL — SIGNIFICANT CHANGE UP (ref 1.6–2.6)
MAGNESIUM SERPL-MCNC: 2.7 MG/DL — HIGH (ref 1.6–2.6)
MCHC RBC-ENTMCNC: 33.5 GM/DL — SIGNIFICANT CHANGE UP (ref 32–36)
MCHC RBC-ENTMCNC: 35.1 PG — HIGH (ref 27–34)
MCV RBC AUTO: 105 FL — HIGH (ref 80–100)
MONOCYTES # BLD AUTO: 0.5 K/UL — SIGNIFICANT CHANGE UP (ref 0–0.9)
MONOCYTES NFR BLD AUTO: 7.6 % — SIGNIFICANT CHANGE UP (ref 2–14)
NEUTROPHILS # BLD AUTO: 5.1 K/UL — SIGNIFICANT CHANGE UP (ref 1.8–7.4)
NEUTROPHILS NFR BLD AUTO: 83.1 % — HIGH (ref 43–77)
PHOSPHATE SERPL-MCNC: 3.7 MG/DL — SIGNIFICANT CHANGE UP (ref 2.5–4.5)
PHOSPHATE SERPL-MCNC: 4.5 MG/DL — SIGNIFICANT CHANGE UP (ref 2.5–4.5)
PHOSPHATE SERPL-MCNC: 4.9 MG/DL — HIGH (ref 2.5–4.5)
PHOSPHATE SERPL-MCNC: 4.9 MG/DL — HIGH (ref 2.5–4.5)
PLATELET # BLD AUTO: 250 K/UL — SIGNIFICANT CHANGE UP (ref 150–400)
POTASSIUM SERPL-MCNC: 4.4 MMOL/L — SIGNIFICANT CHANGE UP (ref 3.5–5.3)
POTASSIUM SERPL-MCNC: 4.9 MMOL/L — SIGNIFICANT CHANGE UP (ref 3.5–5.3)
POTASSIUM SERPL-MCNC: 4.9 MMOL/L — SIGNIFICANT CHANGE UP (ref 3.5–5.3)
POTASSIUM SERPL-MCNC: 5.3 MMOL/L — SIGNIFICANT CHANGE UP (ref 3.5–5.3)
POTASSIUM SERPL-SCNC: 4.4 MMOL/L — SIGNIFICANT CHANGE UP (ref 3.5–5.3)
POTASSIUM SERPL-SCNC: 4.9 MMOL/L — SIGNIFICANT CHANGE UP (ref 3.5–5.3)
POTASSIUM SERPL-SCNC: 4.9 MMOL/L — SIGNIFICANT CHANGE UP (ref 3.5–5.3)
POTASSIUM SERPL-SCNC: 5.3 MMOL/L — SIGNIFICANT CHANGE UP (ref 3.5–5.3)
PROT SERPL-MCNC: 6.8 G/DL — SIGNIFICANT CHANGE UP (ref 6–8.3)
PROTHROM AB SERPL-ACNC: 11.6 SEC — SIGNIFICANT CHANGE UP (ref 9.8–12.7)
RBC # BLD: 2.53 M/UL — LOW (ref 3.8–5.2)
RBC # FLD: 13 % — SIGNIFICANT CHANGE UP (ref 10.3–14.5)
SODIUM SERPL-SCNC: 132 MMOL/L — LOW (ref 135–145)
SODIUM SERPL-SCNC: 134 MMOL/L — LOW (ref 135–145)
SODIUM SERPL-SCNC: 136 MMOL/L — SIGNIFICANT CHANGE UP (ref 135–145)
SODIUM SERPL-SCNC: 136 MMOL/L — SIGNIFICANT CHANGE UP (ref 135–145)
WBC # BLD: 6.2 K/UL — SIGNIFICANT CHANGE UP (ref 3.8–10.5)
WBC # FLD AUTO: 6.2 K/UL — SIGNIFICANT CHANGE UP (ref 3.8–10.5)

## 2017-09-18 PROCEDURE — 99223 1ST HOSP IP/OBS HIGH 75: CPT | Mod: GC

## 2017-09-18 PROCEDURE — 99232 SBSQ HOSP IP/OBS MODERATE 35: CPT

## 2017-09-18 PROCEDURE — 12345: CPT | Mod: NC

## 2017-09-18 RX ORDER — METOPROLOL TARTRATE 50 MG
50 TABLET ORAL
Qty: 0 | Refills: 0 | Status: DISCONTINUED | OUTPATIENT
Start: 2017-09-18 | End: 2017-09-20

## 2017-09-18 RX ORDER — HYDRALAZINE HCL 50 MG
100 TABLET ORAL THREE TIMES A DAY
Qty: 0 | Refills: 0 | Status: DISCONTINUED | OUTPATIENT
Start: 2017-09-18 | End: 2017-09-20

## 2017-09-18 RX ORDER — INSULIN LISPRO 100/ML
1 VIAL (ML) SUBCUTANEOUS
Qty: 0 | Refills: 0 | Status: DISCONTINUED | OUTPATIENT
Start: 2017-09-18 | End: 2017-09-20

## 2017-09-18 RX ORDER — LISINOPRIL 2.5 MG/1
40 TABLET ORAL DAILY
Qty: 0 | Refills: 0 | Status: DISCONTINUED | OUTPATIENT
Start: 2017-09-18 | End: 2017-09-20

## 2017-09-18 RX ADMIN — Medication 50 MILLIGRAM(S): at 18:37

## 2017-09-18 RX ADMIN — OXYCODONE AND ACETAMINOPHEN 1 TABLET(S): 5; 325 TABLET ORAL at 00:00

## 2017-09-18 RX ADMIN — INSULIN HUMAN 2 UNIT(S)/HR: 100 INJECTION, SOLUTION SUBCUTANEOUS at 01:01

## 2017-09-18 RX ADMIN — INSULIN HUMAN 1 UNIT(S)/HR: 100 INJECTION, SOLUTION SUBCUTANEOUS at 04:05

## 2017-09-18 RX ADMIN — SEVELAMER CARBONATE 2400 MILLIGRAM(S): 2400 POWDER, FOR SUSPENSION ORAL at 18:37

## 2017-09-18 RX ADMIN — DULOXETINE HYDROCHLORIDE 60 MILLIGRAM(S): 30 CAPSULE, DELAYED RELEASE ORAL at 13:23

## 2017-09-18 RX ADMIN — LISINOPRIL 40 MILLIGRAM(S): 2.5 TABLET ORAL at 10:06

## 2017-09-18 RX ADMIN — Medication 81 MILLIGRAM(S): at 13:23

## 2017-09-18 RX ADMIN — CLOPIDOGREL BISULFATE 75 MILLIGRAM(S): 75 TABLET, FILM COATED ORAL at 22:08

## 2017-09-18 RX ADMIN — Medication 1 EACH: at 08:36

## 2017-09-18 RX ADMIN — SEVELAMER CARBONATE 2400 MILLIGRAM(S): 2400 POWDER, FOR SUSPENSION ORAL at 13:25

## 2017-09-18 RX ADMIN — SEVELAMER CARBONATE 2400 MILLIGRAM(S): 2400 POWDER, FOR SUSPENSION ORAL at 10:06

## 2017-09-18 RX ADMIN — Medication 100 MILLIGRAM(S): at 22:08

## 2017-09-18 RX ADMIN — ATORVASTATIN CALCIUM 20 MILLIGRAM(S): 80 TABLET, FILM COATED ORAL at 22:08

## 2017-09-18 RX ADMIN — Medication 100 MILLIGRAM(S): at 13:25

## 2017-09-18 NOTE — ADVANCED PRACTICE NURSE CONSULT - ASSESSMENT
Patient is a 62 F with PMHx of T1DM on insulin pump, HTN, HLD, former smoker, MI, CAD s/p PCI to RCA and brachytherapy in Aug 2017, dCHF (last TTE- July 2017- mild MR, mild AS, mild-mod TR EF 65%) chronic LLE pain and edema in setting of severe PVD s/p L & R fem-pop bypass  graft,  multiple vascular surgery both leg w/ fem-pop bypass revisions, Subclavian vein stenosis left s/p stent, ESRD on HD (Tu/Thr/Sat) via L AVF with oliguria, CVA with memory deficit, depression, chronic pain management for LLE on oxycodone, multiple prior admissions to hospital for hyperglycemia,  who presented to ED on 9/16/17 with complaint of acute onset CP with SOB. Insulin pump restarted yesterday at 6pm and FS became too high to read.  BMP ordered and AG 17.  Insulin pump D/C’d and pt restarted on insulin gtt at 1 unit/hour.  FS this morning 156 mg/dl and FS at 8am 118 mg/dl.  Insulin pump infusion site removed from pt and bleeding and indurated site noted, most likely related to insulin pump catheter kink.  Insulin pump forms all completed and in chart.   Insulin pump infusion set changed today by RN, CDE and insulin pump restarted.  Insulin gtt D/C’d simultaneously as per Zach Cast, Endocrine NP orders.   Insulin pump site now on left abdomen and clean, dry and intact.    Pt will be having q1h FS and will have follow-up BMP in 4 hours.  Pt ws NPO overnight as per MD orders.  Regular diet resumed. Pt has more than 3 sets of insulin pump supplies with her.  Pt’s current insulin pump settings as follows:   Basal  12am 0.275  5am 0.375  ICR  12am-12am – 1:15    Insulin Sensitivity  12am 60  7am 40  6pm 60    Blood Glucose Target  12am - 110-130 mg/dl  8am - 100-120 mg/dl    Active Insulin Time  6 hours  .  Goal glucose: 100-180 mg/dl

## 2017-09-18 NOTE — PROGRESS NOTE ADULT - PROBLEM SELECTOR PLAN 2
-next site change due 9/21.  -please document carb intake and bolus history on flowsheet  -discussed w/Pt and MICU team  Endo team to follow  pager: [phone]

## 2017-09-18 NOTE — DIETITIAN INITIAL EVALUATION ADULT. - ENERGY NEEDS
Ht: 64"  Wt: 120.3  BMI: 20.7 kg/m2   IBW: 120 (+/-10%)    within % IBW  Edema:  3+ left foot, ankle  Skin: no pressure injuries

## 2017-09-18 NOTE — PROGRESS NOTE ADULT - ASSESSMENT
62 years old female multiple hospital admissions with coronary artery disease as well as congestive heart failure, peripheral vascular disease, diabetic ketoacidosis and hypertension among others as listed above.  Patient is now with hyperkalemia and abd pain along with mild recurrent DKA as well   1- esrd  2- chf  3- htn /cad  4- PAD  5- shpt   6- DKA    hd in am   at present chf is compensated  cont with lisinopril  and hydralazine.   renvela with meals  insulin per endo recommendations  vascular consult for LLE pain. she has had significant vascular disease LLE

## 2017-09-18 NOTE — PROGRESS NOTE ADULT - SUBJECTIVE AND OBJECTIVE BOX
Patient is a 62y old  Female who presents with a chief complaint of chest pain, SOB, vomiting (16 Sep 2017 17:20)      SUBJECTIVE / OVERNIGHT EVENTS: comfortable  Review of Systems  chest pain no  palpitations no  sob no  nausea no  headache no    MEDICATIONS  (STANDING):  aspirin enteric coated 81 milliGRAM(s) Oral daily  heparin  Injectable 5000 Unit(s) SubCutaneous every 12 hours  atorvastatin 20 milliGRAM(s) Oral at bedtime  clopidogrel Tablet 75 milliGRAM(s) Oral at bedtime  influenza   Vaccine 0.5 milliLiter(s) IntraMuscular once  dextrose 5%. 1000 milliLiter(s) (50 mL/Hr) IV Continuous <Continuous>  dextrose 50% Injectable 12.5 Gram(s) IV Push once  dextrose 50% Injectable 25 Gram(s) IV Push once  dextrose 50% Injectable 25 Gram(s) IV Push once  DULoxetine 60 milliGRAM(s) Oral daily  sevelamer hydrochloride 2400 milliGRAM(s) Oral three times a day with meals  insulin lispro (HumaLOG) Pump 1 Each SubCutaneous Continuous Pump  lisinopril 40 milliGRAM(s) Oral daily  hydrALAZINE 100 milliGRAM(s) Oral three times a day  metoprolol 50 milliGRAM(s) Oral two times a day    MEDICATIONS  (PRN):  dextrose Gel 1 Dose(s) Oral once PRN Blood Glucose LESS THAN 70 milliGRAM(s)/deciLiter  glucagon  Injectable 1 milliGRAM(s) IntraMuscular once PRN Glucose <70 milliGRAM(s)/deciLiter  oxyCODONE    5 mG/acetaminophen 325 mG 1 Tablet(s) Oral at bedtime PRN Severe Pain (7 - 10)          PHYSICAL EXAM:  GENERAL: NAD, well-developed  HEAD:  Atraumatic, Normocephalic  EYES: EOMI, PERRLA, conjunctiva and sclera clear  NECK: Supple, No JVD  CHEST/LUNG: Clear to auscultation bilaterally; No wheeze  HEART: Regular rate and rhythm; No murmurs, rubs, or gallops  ABDOMEN: Soft, Nontender, Nondistended; Bowel sounds present  EXTREMITIES:  2+ Peripheral Pulses, No clubbing, cyanosis, or edema  PSYCH: AAOx3  NEUROLOGY: non-focal  SKIN: No rashes or lesions    LABS:                        8.9    6.2   )-----------( 250      ( 18 Sep 2017 02:38 )             26.5     09-18    134<L>  |  89<L>  |  31<H>  ----------------------------<  151<H>  5.3   |  27  |  6.21<H>    Ca    8.3<L>      18 Sep 2017 13:46  Phos  4.9     09-18  Mg     2.2     09-18    TPro  6.8  /  Alb  4.0  /  TBili  0.3  /  DBili  x   /  AST  18  /  ALT  15  /  AlkPhos  203<H>  09-18    PT/INR - ( 18 Sep 2017 02:38 )   PT: 11.6 sec;   INR: 1.07 ratio         PTT - ( 18 Sep 2017 02:38 )  PTT:28.4 sec  CARDIAC MARKERS ( 17 Sep 2017 18:21 )  x     / 0.16 ng/mL / 163 U/L / x     / 3.7 ng/mL  CARDIAC MARKERS ( 17 Sep 2017 14:33 )  x     / 0.17 ng/mL / 137 U/L / x     / 3.4 ng/mL  CARDIAC MARKERS ( 17 Sep 2017 06:06 )  x     / 0.14 ng/mL / 109 U/L / x     / 2.3 ng/mL          RADIOLOGY & ADDITIONAL TESTS:    Imaging Personally Reviewed:    Consultant(s) Notes Reviewed:      Care Discussed with Consultants/Other Providers:

## 2017-09-18 NOTE — PROGRESS NOTE ADULT - ASSESSMENT
62Fw/ PMHx T1DM on insulin pump, HTN, HLD, former smoker, MI, CAD s/p PCI to RCA and brachytherapy in Aug 2017, dCHF (last TTE- July 2017- mild MR, mild AS, mild-mod TR EF65%) chronic LLE pain and edema in setting of severe PVD s/p L & R fem-pop bypass  graft,  multiple vascular surgery both leg w/ fem-pop bypass revisions , Subclavian vein stenosis left s/p stent, ESRD on HD (Tu/Thr/Sat) via L AVF with oliguria, CVA with memory deficit, depression, chronic pain management for LLE on oxycodone, multiple prior admissions to hospital for hyperglycemia,  who presented to the ED with chest pain and was admitted for management of DKA. DKA resolved yesterday during the day but patient went back into DKA overnight due to pump malfunction/disconnection. Pump re-started this morning under endocrine supervision.     #Neuro  - no acute problems 	    #Pulm   - saturating well on 2L NC, no issues    #Cardiac  - hemodynamically stable  - complained of chest pain on admission, now resolved, CTA negative, EKG with no acute ST changes, CE slightly elevated but likely 2/2 to ESRD  - on home aspirin, plavix, and atorvastatin   - restarted home BP meds including PO hydralazine, lisinopril, and metoprolol     #GI  - on DASH/renal diet    #Renal   - nephro on board, s/p HD (9/16), removed 2L, next dialysis on Tuesday  - last BMP this AM with AG of 17 and HCO3 27, f/u BMP at 2pm  - restarted home sevelamer and cinacalcet    #Endocrine:  - on insulin pump on home settings  - after 2pm BMP, can be transferred to floors if gap remains closed   - FS before meals and at bedtime       #MSK   - restarted home percocet at bedtime for pain  - on home cymbalta    #PPX  - heparin subq for DVT

## 2017-09-18 NOTE — PROGRESS NOTE ADULT - SUBJECTIVE AND OBJECTIVE BOX
CHIEF COMPLAINT:    Interval Events: Yesterday evening patient went back into DKA due to pump malfunctioning/disconnection. Her anion gap opened to 31 and her FS went as high as 690. She was started back on an insulin drip at 2U that was titrated down to 1U. This morning, her FS was 116 and endocrine reconnected her pump and shut off the drip. The patient has no complaints and feels great.     REVIEW OF SYSTEMS:  Constitutional: [x ] negative [ ] fevers [ ] chills [ ] weight loss [ ] weight gain  HEENT: [x ] negative [ ] dry eyes [ ] eye irritation [ ] postnasal drip [ ] nasal congestion  CV: [ x] negative  [ ] chest pain [ ] orthopnea [ ] palpitations [ ] murmur  Resp: [x ] negative [ ] cough [ ] shortness of breath [ ] dyspnea [ ] wheezing [ ] sputum [ ] hemoptysis  GI: [x ] negative [ ] nausea [ ] vomiting [ ] diarrhea [ ] constipation [ ] abd pain [ ] dysphagia   : [x ] negative [ ] dysuria [ ] nocturia [ ] hematuria [ ] increased urinary frequency  Musculoskeletal: [x ] negative [ ] back pain [ ] myalgias [ ] arthralgias [ ] fracture  Skin: [ x] negative [ ] rash [ ] itch  Neurological: [x ] negative [ ] headache [ ] dizziness [ ] syncope [ ] weakness [ ] numbness  Psychiatric: [ x] negative [ ] anxiety [ ] depression  Endocrine: [ ] negative [ x] diabetes [ ] thyroid problem  Hematologic/Lymphatic: [x ] negative [ ] anemia [ ] bleeding problem  Allergic/Immunologic: [x ] negative [ ] itchy eyes [ ] nasal discharge [ ] hives [ ] angioedema  [ ] All other systems negative  [ ] Unable to assess ROS because ________    OBJECTIVE:  ICU Vital Signs Last 24 Hrs  T(C): 36.9 (18 Sep 2017 08:00), Max: 37.1 (17 Sep 2017 16:00)  T(F): 98.5 (18 Sep 2017 08:00), Max: 98.8 (17 Sep 2017 16:00)  HR: 74 (18 Sep 2017 11:00) (70 - 97)  BP: 150/83 (18 Sep 2017 11:00) (113/55 - 178/78)  BP(mean): 104 (18 Sep 2017 11:00) (76 - 114)  ABP: --  ABP(mean): --  RR: 14 (18 Sep 2017 11:00) (13 - 56)  SpO2: 100% (18 Sep 2017 11:00) (97% - 100%)        09-17 @ 07:01 - 09-18 @ 07:00  --------------------------------------------------------  IN: 438 mL / OUT: 0 mL / NET: 438 mL    09-18 @ 07:01 - 09-18 @ 11:52  --------------------------------------------------------  IN: 120 mL / OUT: 0 mL / NET: 120 mL      CAPILLARY BLOOD GLUCOSE  167 (18 Sep 2017 11:00)          PHYSICAL EXAM:  General: sleeping comfortably in bed   HEENT: EOMI, PERRL, MMM  Respiratory: CTAB  Cardiovascular: RRR, normal S1 and S2, no murmurs appreciated   Abdomen: +BS, soft, non tender, non distended   Extremities: trace LLE edema, no edema in right lower extremity   Skin: warm, dry, no rashes   Neurological: AAOx3      LINES:    HOSPITAL MEDICATIONS:  Standing Meds:  aspirin enteric coated 81 milliGRAM(s) Oral daily  heparin  Injectable 5000 Unit(s) SubCutaneous every 12 hours  atorvastatin 20 milliGRAM(s) Oral at bedtime  clopidogrel Tablet 75 milliGRAM(s) Oral at bedtime  influenza   Vaccine 0.5 milliLiter(s) IntraMuscular once  dextrose 5%. 1000 milliLiter(s) IV Continuous <Continuous>  dextrose 50% Injectable 12.5 Gram(s) IV Push once  dextrose 50% Injectable 25 Gram(s) IV Push once  dextrose 50% Injectable 25 Gram(s) IV Push once  DULoxetine 60 milliGRAM(s) Oral daily  sevelamer hydrochloride 2400 milliGRAM(s) Oral three times a day with meals  insulin lispro (HumaLOG) Pump 1 Each SubCutaneous Continuous Pump  lisinopril 40 milliGRAM(s) Oral daily  hydrALAZINE 100 milliGRAM(s) Oral three times a day  metoprolol 50 milliGRAM(s) Oral two times a day      PRN Meds:  dextrose Gel 1 Dose(s) Oral once PRN  glucagon  Injectable 1 milliGRAM(s) IntraMuscular once PRN  oxyCODONE    5 mG/acetaminophen 325 mG 1 Tablet(s) Oral at bedtime PRN      LABS:                        8.9    6.2   )-----------( 250      ( 18 Sep 2017 02:38 )             26.5     Hgb Trend: 8.9<--, 9.6<--, 10.8<--  09-18    136  |  92<L>  |  31<H>  ----------------------------<  151<H>  4.9   |  27  |  5.78<H>    Ca    8.3<L>      18 Sep 2017 06:12  Phos  4.5     09-18  Mg     2.7     09-18    TPro  6.8  /  Alb  4.0  /  TBili  0.3  /  DBili  x   /  AST  18  /  ALT  15  /  AlkPhos  203<H>  09-18    Creatinine Trend: 5.78<--, 5.41<--, 5.26<--, 4.92<--, 4.68<--, 4.38<--  PT/INR - ( 18 Sep 2017 02:38 )   PT: 11.6 sec;   INR: 1.07 ratio         PTT - ( 18 Sep 2017 02:38 )  PTT:28.4 sec      Venous Blood Gas:  09-17 @ 06:14  --/--/--/--/--  VBG Lactate: 1.3  Venous Blood Gas:  09-17 @ 06:10  7.34/46/45/24/76  VBG Lactate: --  Venous Blood Gas:  09-17 @ 02:34  7.38/54/34/31/59  VBG Lactate: 1.1  Venous Blood Gas:  09-16 @ 22:13  7.37/45/40/25/68  VBG Lactate: 1.9  Venous Blood Gas:  09-16 @ 13:48  7.26/49/45/21/66  VBG Lactate: 3.1      MICROBIOLOGY:     RADIOLOGY:  [ ] Reviewed and interpreted by me    EKG:

## 2017-09-18 NOTE — DIETITIAN INITIAL EVALUATION ADULT. - PT NOT SOURCE
pt curled up in bed, eyes closed, stated she didn't feel like talking at this time. No visitors at bedside.

## 2017-09-18 NOTE — DIETITIAN INITIAL EVALUATION ADULT. - OTHER INFO
Pt seen for:  MICU length of stay.  Adm dx:  DKA, hyperkalemia, fluid overload, last HD 9/16, plan for HD today.    GI issues: no N/V/D  Last BM: none since adm.  Food allergies: NKFA   Vit/supplement PTA:  mvi, phos binder

## 2017-09-18 NOTE — PROGRESS NOTE ADULT - SUBJECTIVE AND OBJECTIVE BOX
Diabetes Follow up note:  Interval Hx:  Patient is a 62 F with PMHx of T1DM on insulin pump, HTN, HLD, former smoker, MI, CAD s/p PCI to RCA and brachytherapy in Aug 2017, dCHF (last TTE- July 2017- mild MR, mild AS, mild-mod TR EF 65%) chronic LLE pain and edema in setting of severe PVD s/p L & R fem-pop bypass  graft,  multiple vascular surgery both leg w/ fem-pop bypass revisions, Subclavian vein stenosis left s/p stent, ESRD on HD (Tu/Thr/Sat) via L AVF with oliguria, CVA with memory deficit, depression, chronic pain management for LLE on oxycodone, multiple prior admissions to hospital for hyperglycemia,  who presented to ED on 9/16/17 with complaint of acute onset CP with SOB. Pt had was put back on insulin gtt last night as she became hyperglycemic likely from her catheter being kinked. VAHID Alvarado helped patient restart pump w/new site this AM. BG values 100's today. Pt bolused w/breakfast but had no appetite at lunch so didn't give herself a bolus. Per MICU team-bedboarded for floor bed. No further DKA.     Review of Systems:  General: No complaints.   GI: Tolerating POs without any N/V/D/ABD PAIN.  CV: No CP/SOB  ENDO: No S&Sx of hypoglycemia. Denies polyuria, polydypsia.   MEDS:  atorvastatin 20 milliGRAM(s) Oral at bedtime  insulin lispro (HumaLOG) Pump 1 Each SubCutaneous Continuous Pump  Pump Settings:  Basal: 12am-0.275  5am-0.375  ICR: 1: 15  ISF: 12am-60 7am: 40 6pm: 60  BG Target: 12am: 110-130 8am: 100-120   Active insulin: 6 hours    Allergies    No Known Allergies        PE:  General: Female lying in bed. NAD.   Vital Signs Last 24 Hrs  T(C): 37 (18 Sep 2017 12:00), Max: 37.1 (17 Sep 2017 16:00)  T(F): 98.6 (18 Sep 2017 12:00), Max: 98.8 (17 Sep 2017 16:00)  HR: 75 (18 Sep 2017 15:00) (70 - 97)  BP: 164/72 (18 Sep 2017 15:00) (113/55 - 178/78)  BP(mean): 103 (18 Sep 2017 15:00) (76 - 114)  RR: 16 (18 Sep 2017 15:00) (13 - 56)  SpO2: 99% (18 Sep 2017 15:00) (97% - 100%)  Abd: Soft, NT,ND, Pump site c/d/i  Extremities: Warm. No edema noted.   Neuro: A&O X3    LABS:  CAPILLARY BLOOD GLUCOSE  143 (09-18 @ 13:00)  153 (09-18 @ 12:00)  167 (09-18 @ 11:00)  123 (09-18 @ 10:00)  105 (09-18 @ 09:00)  118 (09-18 @ 08:00)  156 (09-18 @ 07:00)  149 (09-18 @ 06:00)  255 (09-18 @ 05:00)  273 (09-18 @ 04:00)  355 (09-18 @ 03:00)  446 (09-18 @ 02:00)  588 (09-18 @ 01:00)  591 (09-17 @ 23:00)  563 (09-17 @ 22:00)  252 (09-17 @ 18:00)  197 (09-17 @ 17:00)  237 (09-17 @ 16:00)  250 (09-17 @ 15:00)  200 (09-17 @ 14:00)  151 (09-17 @ 13:00)  111 (09-17 @ 12:00)  106 (09-17 @ 11:00)  106 (09-17 @ 10:00)  119 (09-17 @ 09:00)  123 (09-17 @ 08:00)  204 (09-17 @ 07:00)  276 (09-17 @ 06:00)  280 (09-17 @ 05:00)  173 (09-17 @ 04:00)  118 (09-17 @ 03:00)  104 (09-17 @ 02:00)  128 (09-17 @ 01:00)  159 (09-17 @ 00:00)  246 (09-16 @ 23:00)  247 (09-16 @ 22:00)  191 (09-16 @ 21:00)  130 (09-16 @ 20:00)  101 (09-16 @ 19:00)  126 (09-16 @ 18:20)  70 (09-16 @ 18:00)  105 (09-16 @ 17:00)  206 (09-16 @ 15:45)  259 (09-16 @ 15:06)  349 (09-16 @ 14:29)  420 (09-16 @ 13:25)  447 (09-16 @ 13:04)  430 (09-16 @ 12:22)  342 (09-16 @ 09:44)                            8.9    6.2   )-----------( 250      ( 18 Sep 2017 02:38 )             26.5       09-18    134<L>  |  89<L>  |  31<H>  ----------------------------<  151<H>  5.3   |  27  |  6.21<H>    Ca    8.3<L>      18 Sep 2017 13:46  Phos  4.9     09-18  Mg     2.2     09-18    TPro  6.8  /  Alb  4.0  /  TBili  0.3  /  DBili  x   /  AST  18  /  ALT  15  /  AlkPhos  203<H>  09-18          Hemoglobin A1C, Whole Blood: 6.8 % <H> [4.0 - 5.6] (07-24-17 @ 06:15)  Hemoglobin A1C, Whole Blood: 6.8 % <H> [4.0 - 5.6] (07-23-17 @ 22:45)            Contact number: isabel [phone] or [phone]

## 2017-09-18 NOTE — PROGRESS NOTE ADULT - SUBJECTIVE AND OBJECTIVE BOX
[facility]   [phone]  RENAL FOLLOW UP NOTE  --------------------------------------------------------------------------------  Chief Complaint:    24 hour events/subjective:  seen in micu. still appears lethargic. states had N/V o/n      PAST HISTORY  --------------------------------------------------------------------------------  No significant changes to PMH, PSH, FHx, SHx, unless otherwise noted    ALLERGIES & MEDICATIONS  --------------------------------------------------------------------------------  Allergies    No Known Allergies    Intolerances      Standing Inpatient Medications  aspirin enteric coated 81 milliGRAM(s) Oral daily  heparin  Injectable 5000 Unit(s) SubCutaneous every 12 hours  atorvastatin 20 milliGRAM(s) Oral at bedtime  clopidogrel Tablet 75 milliGRAM(s) Oral at bedtime  influenza   Vaccine 0.5 milliLiter(s) IntraMuscular once  dextrose 5%. 1000 milliLiter(s) IV Continuous <Continuous>  dextrose 50% Injectable 12.5 Gram(s) IV Push once  dextrose 50% Injectable 25 Gram(s) IV Push once  dextrose 50% Injectable 25 Gram(s) IV Push once  DULoxetine 60 milliGRAM(s) Oral daily  sevelamer hydrochloride 2400 milliGRAM(s) Oral three times a day with meals  insulin lispro (HumaLOG) Pump 1 Each SubCutaneous Continuous Pump  lisinopril 40 milliGRAM(s) Oral daily  hydrALAZINE 100 milliGRAM(s) Oral three times a day  metoprolol 50 milliGRAM(s) Oral two times a day    PRN Inpatient Medications  dextrose Gel 1 Dose(s) Oral once PRN  glucagon  Injectable 1 milliGRAM(s) IntraMuscular once PRN  oxyCODONE    5 mG/acetaminophen 325 mG 1 Tablet(s) Oral at bedtime PRN      REVIEW OF SYSTEMS  --------------------------------------------------------------------------------    Gen: denies  fevers/chills,  CVS: denies chest pain/palpitations  Resp: denies SOB/Cough  GI: Denies  at present no new N/V/Abd pain but had N/V last pm   : Denies dysuria  LE LLE pain per pt    All other systems were reviewed and are negative, except as noted.    VITALS/PHYSICAL EXAM  --------------------------------------------------------------------------------  T(C): 36.9 (09-18-17 @ 08:00), Max: 37.1 (09-17-17 @ 16:00)  HR: 75 (09-18-17 @ 10:00) (70 - 97)  BP: 165/77 (09-18-17 @ 10:00) (113/55 - 178/78)  RR: 18 (09-18-17 @ 10:00) (13 - 56)  SpO2: 100% (09-18-17 @ 10:00) (97% - 100%)  Wt(kg): --  Height (cm): 162.56 (09-16-17 @ 15:45)  Weight (kg): 54.6 (09-16-17 @ 15:45)  BMI (kg/m2): 20.7 (09-16-17 @ 15:45)  BSA (m2): 1.58 (09-16-17 @ 15:45)      09-17-17 @ 07:01  -  09-18-17 @ 07:00  --------------------------------------------------------  IN: 438 mL / OUT: 0 mL / NET: 438 mL    09-18-17 @ 07:01  -  09-18-17 @ 10:19  --------------------------------------------------------  IN: 0 mL / OUT: 0 mL / NET: 0 mL      Physical Exam:  	    Physical Exam:  	Gen: alert oriented somewhat lethargic  	Pulm: Decreased breath sounds b/l bases. no rales or ronchi or wheezing  	CV: RRR, S1/S2. no rub  	Back: No CVA tenderness; no sacral edema  	Abd: +BS, soft, nontender/nondistended  	: No suprapubic tenderness.               Extremity: No cyanosis, LLE 2+ edema no clubbing  	    LABS/STUDIES  --------------------------------------------------------------------------------              8.9    6.2   >-----------<  250      [09-18-17 @ 02:38]              26.5     136  |  92  |  31  ----------------------------<  151      [09-18-17 @ 06:12]  4.9   |  27  |  5.78        Ca     8.3     [09-18-17 @ 06:12]      Mg     2.7     [09-18-17 @ 06:12]      Phos  4.5     [09-18-17 @ 06:12]    TPro  6.8  /  Alb  4.0  /  TBili  0.3  /  DBili  x   /  AST  18  /  ALT  15  /  AlkPhos  203  [09-18-17 @ 02:38]    PT/INR: PT 11.6 , INR 1.07       [09-18-17 @ 02:38]  PTT: 28.4       [09-18-17 @ 02:38]    Troponin 0.16      [09-17-17 @ 18:21]        [09-17-17 @ 18:21]    Creatinine Trend:  SCr 5.78 [09-18 @ 06:12]  SCr 5.41 [09-18 @ 02:38]  SCr 5.26 [09-17 @ 23:32]  SCr 4.92 [09-17 @ 20:00]  SCr 4.68 [09-17 @ 18:21]                  HbA1c 6.8      [07-24-17 @ 06:15]

## 2017-09-18 NOTE — PROGRESS NOTE ADULT - ASSESSMENT
62 f with   DKA- insulin, endocrine evaluation, ICU care  ESRD- continue HD, nephrology follow Dr. Schmidt  chest painresolved-patient with known CAD, stents. Known to Dr. Vela.   Anxiety control  ICU care  Pierce Viera MD pager [phone]

## 2017-09-18 NOTE — PROGRESS NOTE ADULT - ATTENDING COMMENTS
Agree with above. Insulin pump restarted. AG 16 this am, some metabolic acidosis maybe from renal failure. FS better. Followed by endocrine.

## 2017-09-18 NOTE — [facility] NURSE CONSULT - RECOMMEDATIONS
Primary RN to f/u with monitoring/documenting q1h BG FS, making sure pt consumes carb consistent meals, insulin pump site assessment, assessing S/S hypo/hyperglycemia and tracking carb intake, meal/correction boluses. Insulin pump forms ALL completed and in chart.  DSME provided documented in Adult Plan of Care.

## 2017-09-18 NOTE — DIETITIAN INITIAL EVALUATION ADULT. - ADHERENCE
Pt has previous h/o ? compliance w/ diet restrictions, has had numerous adm for DKA.  On insulin pump ([facility] RN note reviewed for insulin pump settings). Pt's last A1c was 6.8 on 7/24.

## 2017-09-18 NOTE — PROGRESS NOTE ADULT - PROBLEM SELECTOR PLAN 1
-test BG AC/HS/2AM  -c/w current insulin pump settings (see above)  -please notify endo team if blood glucose values begin to elevate or s/s of DKA.  -can check BMP daily or more often if patient becomes hyperglycemic >250

## 2017-09-18 NOTE — [facility] NURSE CONSULT - REASON FOR CONSULT
Certified Diabetes Educator     Pt consulted for uncontrolled Type 1 DM w/ hyperglycemia and insulin pump therapy.

## 2017-09-19 LAB
ALBUMIN SERPL ELPH-MCNC: 3.3 G/DL — SIGNIFICANT CHANGE UP (ref 3.3–5)
ALP SERPL-CCNC: 182 U/L — HIGH (ref 40–120)
ALT FLD-CCNC: 9 U/L — LOW (ref 10–45)
ANION GAP SERPL CALC-SCNC: 16 MMOL/L — SIGNIFICANT CHANGE UP (ref 5–17)
ANION GAP SERPL CALC-SCNC: 18 MMOL/L — HIGH (ref 5–17)
ANION GAP SERPL CALC-SCNC: 20 MMOL/L — HIGH (ref 5–17)
APTT BLD: 27.9 SEC — SIGNIFICANT CHANGE UP (ref 27.5–37.4)
AST SERPL-CCNC: 17 U/L — SIGNIFICANT CHANGE UP (ref 10–40)
BASOPHILS # BLD AUTO: 0.01 K/UL — SIGNIFICANT CHANGE UP (ref 0–0.2)
BASOPHILS NFR BLD AUTO: 0.2 % — SIGNIFICANT CHANGE UP (ref 0–2)
BILIRUB SERPL-MCNC: 0.2 MG/DL — SIGNIFICANT CHANGE UP (ref 0.2–1.2)
BUN SERPL-MCNC: 14 MG/DL — SIGNIFICANT CHANGE UP (ref 7–23)
BUN SERPL-MCNC: 34 MG/DL — HIGH (ref 7–23)
BUN SERPL-MCNC: 35 MG/DL — HIGH (ref 7–23)
CALCIUM SERPL-MCNC: 7.9 MG/DL — LOW (ref 8.4–10.5)
CALCIUM SERPL-MCNC: 8 MG/DL — LOW (ref 8.4–10.5)
CALCIUM SERPL-MCNC: 8.5 MG/DL — SIGNIFICANT CHANGE UP (ref 8.4–10.5)
CHLORIDE SERPL-SCNC: 89 MMOL/L — LOW (ref 96–108)
CHLORIDE SERPL-SCNC: 90 MMOL/L — LOW (ref 96–108)
CHLORIDE SERPL-SCNC: 95 MMOL/L — LOW (ref 96–108)
CO2 SERPL-SCNC: 22 MMOL/L — SIGNIFICANT CHANGE UP (ref 22–31)
CO2 SERPL-SCNC: 24 MMOL/L — SIGNIFICANT CHANGE UP (ref 22–31)
CO2 SERPL-SCNC: 27 MMOL/L — SIGNIFICANT CHANGE UP (ref 22–31)
CREAT SERPL-MCNC: 3.65 MG/DL — HIGH (ref 0.5–1.3)
CREAT SERPL-MCNC: 6.77 MG/DL — HIGH (ref 0.5–1.3)
CREAT SERPL-MCNC: 7.17 MG/DL — HIGH (ref 0.5–1.3)
EOSINOPHIL # BLD AUTO: 0.12 K/UL — SIGNIFICANT CHANGE UP (ref 0–0.5)
EOSINOPHIL NFR BLD AUTO: 2.7 % — SIGNIFICANT CHANGE UP (ref 0–6)
GLUCOSE SERPL-MCNC: 166 MG/DL — HIGH (ref 70–99)
GLUCOSE SERPL-MCNC: 190 MG/DL — HIGH (ref 70–99)
GLUCOSE SERPL-MCNC: 191 MG/DL — HIGH (ref 70–99)
HBV CORE AB SER-ACNC: SIGNIFICANT CHANGE UP
HBV SURFACE AB SER-ACNC: <3 MIU/ML — LOW
HBV SURFACE AG SER-ACNC: SIGNIFICANT CHANGE UP
HCT VFR BLD CALC: 27.3 % — LOW (ref 34.5–45)
HGB BLD-MCNC: 8.4 G/DL — LOW (ref 11.5–15.5)
IMM GRANULOCYTES NFR BLD AUTO: 0.2 % — SIGNIFICANT CHANGE UP (ref 0–1.5)
INR BLD: 0.93 RATIO — SIGNIFICANT CHANGE UP (ref 0.88–1.16)
LYMPHOCYTES # BLD AUTO: 0.6 K/UL — LOW (ref 1–3.3)
LYMPHOCYTES # BLD AUTO: 13.7 % — SIGNIFICANT CHANGE UP (ref 13–44)
MAGNESIUM SERPL-MCNC: 2.2 MG/DL — SIGNIFICANT CHANGE UP (ref 1.6–2.6)
MAGNESIUM SERPL-MCNC: 2.3 MG/DL — SIGNIFICANT CHANGE UP (ref 1.6–2.6)
MCHC RBC-ENTMCNC: 30.8 GM/DL — LOW (ref 32–36)
MCHC RBC-ENTMCNC: 31.7 PG — SIGNIFICANT CHANGE UP (ref 27–34)
MCV RBC AUTO: 103 FL — HIGH (ref 80–100)
MONOCYTES # BLD AUTO: 0.44 K/UL — SIGNIFICANT CHANGE UP (ref 0–0.9)
MONOCYTES NFR BLD AUTO: 10 % — SIGNIFICANT CHANGE UP (ref 2–14)
NEUTROPHILS # BLD AUTO: 3.21 K/UL — SIGNIFICANT CHANGE UP (ref 1.8–7.4)
NEUTROPHILS NFR BLD AUTO: 73.2 % — SIGNIFICANT CHANGE UP (ref 43–77)
PHOSPHATE SERPL-MCNC: 4.8 MG/DL — HIGH (ref 2.5–4.5)
PHOSPHATE SERPL-MCNC: 4.9 MG/DL — HIGH (ref 2.5–4.5)
PLATELET # BLD AUTO: 242 K/UL — SIGNIFICANT CHANGE UP (ref 150–400)
POTASSIUM SERPL-MCNC: 4.7 MMOL/L — SIGNIFICANT CHANGE UP (ref 3.5–5.3)
POTASSIUM SERPL-MCNC: 5.4 MMOL/L — HIGH (ref 3.5–5.3)
POTASSIUM SERPL-MCNC: 5.5 MMOL/L — HIGH (ref 3.5–5.3)
POTASSIUM SERPL-SCNC: 4.7 MMOL/L — SIGNIFICANT CHANGE UP (ref 3.5–5.3)
POTASSIUM SERPL-SCNC: 5.4 MMOL/L — HIGH (ref 3.5–5.3)
POTASSIUM SERPL-SCNC: 5.5 MMOL/L — HIGH (ref 3.5–5.3)
PROT SERPL-MCNC: 6.1 G/DL — SIGNIFICANT CHANGE UP (ref 6–8.3)
PROTHROM AB SERPL-ACNC: 10.5 SEC — SIGNIFICANT CHANGE UP (ref 10–13.1)
RBC # BLD: 2.65 M/UL — LOW (ref 3.8–5.2)
RBC # FLD: 13.9 % — SIGNIFICANT CHANGE UP (ref 10.3–14.5)
SODIUM SERPL-SCNC: 131 MMOL/L — LOW (ref 135–145)
SODIUM SERPL-SCNC: 132 MMOL/L — LOW (ref 135–145)
SODIUM SERPL-SCNC: 138 MMOL/L — SIGNIFICANT CHANGE UP (ref 135–145)
WBC # BLD: 4.39 K/UL — SIGNIFICANT CHANGE UP (ref 3.8–10.5)
WBC # FLD AUTO: 4.39 K/UL — SIGNIFICANT CHANGE UP (ref 3.8–10.5)

## 2017-09-19 PROCEDURE — 99233 SBSQ HOSP IP/OBS HIGH 50: CPT

## 2017-09-19 RX ORDER — ERYTHROPOIETIN 10000 [IU]/ML
15000 INJECTION, SOLUTION INTRAVENOUS; SUBCUTANEOUS ONCE
Qty: 0 | Refills: 0 | Status: COMPLETED | OUTPATIENT
Start: 2017-09-19 | End: 2017-09-19

## 2017-09-19 RX ADMIN — Medication 50 MILLIGRAM(S): at 05:28

## 2017-09-19 RX ADMIN — ATORVASTATIN CALCIUM 20 MILLIGRAM(S): 80 TABLET, FILM COATED ORAL at 20:43

## 2017-09-19 RX ADMIN — Medication 100 MILLIGRAM(S): at 20:43

## 2017-09-19 RX ADMIN — DULOXETINE HYDROCHLORIDE 60 MILLIGRAM(S): 30 CAPSULE, DELAYED RELEASE ORAL at 13:11

## 2017-09-19 RX ADMIN — Medication 100 MILLIGRAM(S): at 05:28

## 2017-09-19 RX ADMIN — SEVELAMER CARBONATE 2400 MILLIGRAM(S): 2400 POWDER, FOR SUSPENSION ORAL at 09:27

## 2017-09-19 RX ADMIN — ERYTHROPOIETIN 15000 UNIT(S): 10000 INJECTION, SOLUTION INTRAVENOUS; SUBCUTANEOUS at 11:10

## 2017-09-19 RX ADMIN — CLOPIDOGREL BISULFATE 75 MILLIGRAM(S): 75 TABLET, FILM COATED ORAL at 20:43

## 2017-09-19 RX ADMIN — LISINOPRIL 40 MILLIGRAM(S): 2.5 TABLET ORAL at 06:53

## 2017-09-19 RX ADMIN — Medication 100 MILLIGRAM(S): at 13:11

## 2017-09-19 RX ADMIN — OXYCODONE AND ACETAMINOPHEN 1 TABLET(S): 5; 325 TABLET ORAL at 02:43

## 2017-09-19 RX ADMIN — Medication 50 MILLIGRAM(S): at 17:34

## 2017-09-19 RX ADMIN — HEPARIN SODIUM 5000 UNIT(S): 5000 INJECTION INTRAVENOUS; SUBCUTANEOUS at 17:34

## 2017-09-19 RX ADMIN — HEPARIN SODIUM 5000 UNIT(S): 5000 INJECTION INTRAVENOUS; SUBCUTANEOUS at 05:28

## 2017-09-19 RX ADMIN — OXYCODONE AND ACETAMINOPHEN 1 TABLET(S): 5; 325 TABLET ORAL at 01:50

## 2017-09-19 RX ADMIN — Medication 81 MILLIGRAM(S): at 13:12

## 2017-09-19 NOTE — PROGRESS NOTE ADULT - ASSESSMENT
62 f with   DKA resolved- insulin, endocrine follow  ESRD- continue HD, nephrology follow Dr. Schmidt  chest pain resolved-patient with known CAD, stents. Known to Dr. Vela.   Anxiety control  Out of ICU  Pierce Viera MD pager [phone]

## 2017-09-19 NOTE — PROGRESS NOTE ADULT - SUBJECTIVE AND OBJECTIVE BOX
[facility]   [phone]  DIALYSIS NOTE  Chief Complaint: ESRD/Ongoing hemodialysis requirement. seen on hd    24 hour events/subjective:    states is sleepy        ALLERGIES & MEDICATIONS  --------------------------------------------------------------------------------  Allergies    No Known Allergies    Intolerances      Standing Inpatient Medications  aspirin enteric coated 81 milliGRAM(s) Oral daily  heparin  Injectable 5000 Unit(s) SubCutaneous every 12 hours  atorvastatin 20 milliGRAM(s) Oral at bedtime  clopidogrel Tablet 75 milliGRAM(s) Oral at bedtime  influenza   Vaccine 0.5 milliLiter(s) IntraMuscular once  dextrose 5%. 1000 milliLiter(s) IV Continuous <Continuous>  dextrose 50% Injectable 12.5 Gram(s) IV Push once  dextrose 50% Injectable 25 Gram(s) IV Push once  dextrose 50% Injectable 25 Gram(s) IV Push once  DULoxetine 60 milliGRAM(s) Oral daily  sevelamer hydrochloride 2400 milliGRAM(s) Oral three times a day with meals  insulin lispro (HumaLOG) Pump 1 Each SubCutaneous Continuous Pump  lisinopril 40 milliGRAM(s) Oral daily  hydrALAZINE 100 milliGRAM(s) Oral three times a day  metoprolol 50 milliGRAM(s) Oral two times a day  epoetin azalea Injectable 08986 Unit(s) IV Push once    PRN Inpatient Medications  dextrose Gel 1 Dose(s) Oral once PRN  glucagon  Injectable 1 milliGRAM(s) IntraMuscular once PRN  oxyCODONE    5 mG/acetaminophen 325 mG 1 Tablet(s) Oral at bedtime PRN      REVIEW OF SYSTEMS  --------------------------------------------------------------------------------  no itching or rash  no fever or chill  no cp or palp   no sob or cough   no N/V/D/ no abd pain   ext left leg pain and edema        VITALS/PHYSICAL EXAM  --------------------------------------------------------------------------------  T(C): 36.9 (09-19-17 @ 09:05), Max: 37 (09-18-17 @ 12:00)  HR: 64 (09-19-17 @ 09:05) (64 - 80)  BP: 136/68 (09-19-17 @ 09:05) (119/67 - 171/79)  RR: 18 (09-19-17 @ 09:05) (11 - 27)  SpO2: 97% (09-19-17 @ 09:05) (95% - 100%)  Wt(kg): --        09-18-17 @ 07:01  -  09-19-17 @ 07:00  --------------------------------------------------------  IN: 950 mL / OUT: 0 mL / NET: 950 mL      Physical Exam:  		    	Gen: alert oriented place person and date   	Pulm: Decreased breath sounds b/l bases no rales or ronchi  	CV: RRR, S1/S2  	Abd: +BS, soft, nontender/nondistended  	Extremity: No cyanosis, LLE  edema no clubbing  		    LABS/STUDIES  --------------------------------------------------------------------------------              8.9    6.2   >-----------<  250      [09-18-17 @ 02:38]              26.5     132  |  90  |  35  ----------------------------<  190      [09-19-17 @ 07:21]  5.4   |  24  |  7.17        Ca     7.9     [09-19-17 @ 07:21]      Mg     2.2     [09-19-17 @ 07:21]      Phos  4.9     [09-19-17 @ 07:21]    TPro  6.8  /  Alb  4.0  /  TBili  0.3  /  DBili  x   /  AST  18  /  ALT  15  /  AlkPhos  203  [09-18-17 @ 02:38]    PT/INR: PT 10.5 , INR 0.93       [09-19-17 @ 08:53]  PTT: 27.9       [09-19-17 @ 08:53]    Troponin 0.16      [09-17-17 @ 18:21]        [09-17-17 @ 18:21]            imp/suggest: ESRD      Hemodialysis Prescription:  	Access: avf  	Dialyzer: revaclear 300  	Blood Flow (mL/Min): 400  	Dialysate Flow (mL/Min): 600  	Target UF (Liters): 2-2.5 liter as tolerated  	Treatment Time: 3. 5hr  	Potassium: 2k   	Calcium: 2.5  	  YOLANDA epogen [id number] ivp    Vitamin D     continue with hd   see hd flow sheet

## 2017-09-19 NOTE — PROGRESS NOTE ADULT - SUBJECTIVE AND OBJECTIVE BOX
Patient is a 62y old  Female who presents with a chief complaint of chest pain, SOB, vomiting (16 Sep 2017 17:20)      SUBJECTIVE / OVERNIGHT EVENTS: Comfortable without new complaints.   Review of Systems  chest pain no  palpitations no  sob no  nausea no  headache no    MEDICATIONS  (STANDING):  aspirin enteric coated 81 milliGRAM(s) Oral daily  heparin  Injectable 5000 Unit(s) SubCutaneous every 12 hours  atorvastatin 20 milliGRAM(s) Oral at bedtime  clopidogrel Tablet 75 milliGRAM(s) Oral at bedtime  influenza   Vaccine 0.5 milliLiter(s) IntraMuscular once  dextrose 5%. 1000 milliLiter(s) (50 mL/Hr) IV Continuous <Continuous>  dextrose 50% Injectable 12.5 Gram(s) IV Push once  dextrose 50% Injectable 25 Gram(s) IV Push once  dextrose 50% Injectable 25 Gram(s) IV Push once  DULoxetine 60 milliGRAM(s) Oral daily  sevelamer hydrochloride 2400 milliGRAM(s) Oral three times a day with meals  insulin lispro (HumaLOG) Pump 1 Each SubCutaneous Continuous Pump  lisinopril 40 milliGRAM(s) Oral daily  hydrALAZINE 100 milliGRAM(s) Oral three times a day  metoprolol 50 milliGRAM(s) Oral two times a day    MEDICATIONS  (PRN):  dextrose Gel 1 Dose(s) Oral once PRN Blood Glucose LESS THAN 70 milliGRAM(s)/deciLiter  glucagon  Injectable 1 milliGRAM(s) IntraMuscular once PRN Glucose <70 milliGRAM(s)/deciLiter  oxyCODONE    5 mG/acetaminophen 325 mG 1 Tablet(s) Oral at bedtime PRN Severe Pain (7 - 10)          PHYSICAL EXAM:  GENERAL: NAD  HEAD:  Atraumatic, Normocephalic  EYES: EOMI, PERRLA, conjunctiva and sclera clear  NECK: Supple, No JVD  CHEST/LUNG: Clear to auscultation bilaterally; No wheeze  HEART: Regular rate and rhythm; No murmurs, rubs, or gallops  ABDOMEN: Soft, Nontender, Nondistended; Bowel sounds present  EXTREMITIES:  2+ Peripheral Pulses, No clubbing, cyanosis, or edema  PSYCH: AAOx3  NEUROLOGY: non-focal  SKIN: No rashes or lesions    LABS:                        8.4    4.39  )-----------( 242      ( 19 Sep 2017 08:53 )             27.3     09-19    131<L>  |  89<L>  |  34<H>  ----------------------------<  191<H>  5.5<H>   |  22  |  6.77<H>    Ca    8.0<L>      19 Sep 2017 08:44  Phos  4.8     09-19  Mg     2.3     09-19    TPro  6.1  /  Alb  3.3  /  TBili  0.2  /  DBili  x   /  AST  17  /  ALT  9<L>  /  AlkPhos  182<H>  09-19    PT/INR - ( 19 Sep 2017 08:53 )   PT: 10.5 sec;   INR: 0.93 ratio         PTT - ( 19 Sep 2017 08:53 )  PTT:27.9 sec  CARDIAC MARKERS ( 17 Sep 2017 18:21 )  x     / 0.16 ng/mL / 163 U/L / x     / 3.7 ng/mL          RADIOLOGY & ADDITIONAL TESTS:    Imaging Personally Reviewed:    Consultant(s) Notes Reviewed:      Care Discussed with Consultants/Other Providers:

## 2017-09-19 NOTE — PROGRESS NOTE ADULT - PROBLEM SELECTOR PLAN 1
-test BG AC/HS/2AM  -c/w current insulin pump settings (see above)  -please notify endo team if blood glucose values begin to elevate or s/s of DKA.  -Ib BG elevated pt to do an insulin correction bolus> Test in 1 hour and if BG the same or worsen pt to change insulin pump site and bolus again> Check 1 hour later and if BG remains the same contact DM team.  -Plan discussed with pt/team.  Contact info: [phone] (24/7). pager [phone]

## 2017-09-19 NOTE — PROGRESS NOTE ADULT - SUBJECTIVE AND OBJECTIVE BOX
Diabetes Follow up note:  Interval Hx: 62 F with PMHx of T1DM on insulin pump and multiple DM complications and comorbidities. Well known by team due to frequent admissions with diabetes or cardiac problems. Here with SOB/CP/DKA now resolved Saw pt while on HD. Tolerating POs.     Review of Systems:  General: 'I'm fine"   GI: Tolerating POs without any N/V/D/ABD PAIN.  CV: No CP/SOB  ENDO: No S&Sx of hypoglycemia. Denies polyuria, polydypsia.       MEDS:  atorvastatin 20 milliGRAM(s) Oral at bedtime  insulin lispro (HumaLOG) Pump 1 Each SubCutaneous Continuous Pump  Pump Settings:  Basal: 12am-0.275  5am-0.375  ICR: 1: 15  ISF: 12am-60 7am: 40 6pm: 60  BG Target: 12am: 110-130 8am: 100-120   Active insulin: 6 hours  Reviewed settings and boluses.    Allergies    No Known Allergies        PE:  General: Female lying in bed. NAD.   Vital Signs Last 24 Hrs  T(C): 37.1 (09-19-17 @ 15:28), Max: 37.1 (09-19-17 @ 15:28)  T(F): 98.8 (09-19-17 @ 15:28), Max: 98.8 (09-19-17 @ 15:28)  HR: 70 (09-19-17 @ 15:28) (64 - 80)  BP: 143/58 (09-19-17 @ 15:28) (119/67 - 171/79)  BP(mean): 112 (09-18-17 @ 23:17) (101 - 125)  RR: 16 (09-19-17 @ 15:28) (11 - 27)  SpO2: 92% (09-19-17 @ 15:28) (92% - 100%)  Abd: Soft, NT, ND, Pumpin L Q site D&I  Extremities: Warm. No edema noted.   Neuro: A&O X3    LABS:  CAPILLARY BLOOD GLUCOSE  145 (19 Sep 2017 16:45)  118 (19 Sep 2017 13:10)  167 (19 Sep 2017 10:45)  166 (19 Sep 2017 08:26)  133 (19 Sep 2017 03:00)  117 (18 Sep 2017 23:45)  99 (18 Sep 2017 22:30)  141 (18 Sep 2017 18:00)  143 (18 Sep 2017 13:00)  153 (18 Sep 2017 12:00)  167 (18 Sep 2017 11:00)  123 (18 Sep 2017 10:00)  105 (18 Sep 2017 09:00)  118 (18 Sep 2017 08:00)  156 (18 Sep 2017 07:00)  149 (18 Sep 2017 06:00)  255 (18 Sep 2017 05:00)  273 (18 Sep 2017 04:00)  355 (18 Sep 2017 03:00)  446 (18 Sep 2017 02:00)                                8.4    4.39  )-----------( 242      ( 19 Sep 2017 08:53 )             27.3     09-19    131<L>  |  89<L>  |  34<H>  ----------------------------<  191<H>  5.5<H>   |  22  |  6.77<H>    Ca    8.0<L>      19 Sep 2017 08:44  Phos  4.8     09-19  Mg     2.3     09-19    TPro  6.1  /  Alb  3.3  /  TBili  0.2  /  DBili  x   /  AST  17  /  ALT  9<L>  /  AlkPhos  182<H>  09-19      Hemoglobin A1C, Whole Blood: 6.8 % <H> [4.0 - 5.6] (07-24-17 @ 06:15)  Hemoglobin A1C, Whole Blood: 6.8 % <H> [4.0 - 5.6] (07-23-17 @ 22:45)

## 2017-09-19 NOTE — PROGRESS NOTE ADULT - ASSESSMENT
61 y/o F w/ uncontrolled Type 1 DM w/ ESRD on HD, on insulin pump admitted with DKA/SOB/CP. Feeling much better. Tolerating POs with BG at goal while on insulin pump on present insulin settings. No hypoglycemia. Stable wbc..

## 2017-09-20 ENCOUNTER — TRANSCRIPTION ENCOUNTER (OUTPATIENT)
Age: 62
End: 2017-09-20

## 2017-09-20 VITALS
HEART RATE: 62 BPM | SYSTOLIC BLOOD PRESSURE: 161 MMHG | DIASTOLIC BLOOD PRESSURE: 63 MMHG | TEMPERATURE: 98 F | OXYGEN SATURATION: 100 % | RESPIRATION RATE: 18 BRPM

## 2017-09-20 LAB
ANION GAP SERPL CALC-SCNC: 15 MMOL/L — SIGNIFICANT CHANGE UP (ref 5–17)
ANION GAP SERPL CALC-SCNC: 18 MMOL/L — HIGH (ref 5–17)
BUN SERPL-MCNC: 18 MG/DL — SIGNIFICANT CHANGE UP (ref 7–23)
BUN SERPL-MCNC: 19 MG/DL — SIGNIFICANT CHANGE UP (ref 7–23)
CALCIUM SERPL-MCNC: 8 MG/DL — LOW (ref 8.4–10.5)
CALCIUM SERPL-MCNC: 8.1 MG/DL — LOW (ref 8.4–10.5)
CHLORIDE SERPL-SCNC: 94 MMOL/L — LOW (ref 96–108)
CHLORIDE SERPL-SCNC: 96 MMOL/L — SIGNIFICANT CHANGE UP (ref 96–108)
CO2 SERPL-SCNC: 24 MMOL/L — SIGNIFICANT CHANGE UP (ref 22–31)
CO2 SERPL-SCNC: 26 MMOL/L — SIGNIFICANT CHANGE UP (ref 22–31)
CREAT SERPL-MCNC: 4.56 MG/DL — HIGH (ref 0.5–1.3)
CREAT SERPL-MCNC: 4.63 MG/DL — HIGH (ref 0.5–1.3)
GLUCOSE SERPL-MCNC: 126 MG/DL — HIGH (ref 70–99)
GLUCOSE SERPL-MCNC: 126 MG/DL — HIGH (ref 70–99)
HCT VFR BLD CALC: 29.2 % — LOW (ref 34.5–45)
HGB BLD-MCNC: 9.1 G/DL — LOW (ref 11.5–15.5)
INR BLD: 0.97 RATIO — SIGNIFICANT CHANGE UP (ref 0.88–1.16)
MAGNESIUM SERPL-MCNC: 2.2 MG/DL — SIGNIFICANT CHANGE UP (ref 1.6–2.6)
MCHC RBC-ENTMCNC: 31.2 GM/DL — LOW (ref 32–36)
MCHC RBC-ENTMCNC: 31.9 PG — SIGNIFICANT CHANGE UP (ref 27–34)
MCV RBC AUTO: 102.5 FL — HIGH (ref 80–100)
PHOSPHATE SERPL-MCNC: 3.5 MG/DL — SIGNIFICANT CHANGE UP (ref 2.5–4.5)
PLATELET # BLD AUTO: 242 K/UL — SIGNIFICANT CHANGE UP (ref 150–400)
POTASSIUM SERPL-MCNC: 4.5 MMOL/L — SIGNIFICANT CHANGE UP (ref 3.5–5.3)
POTASSIUM SERPL-MCNC: 4.6 MMOL/L — SIGNIFICANT CHANGE UP (ref 3.5–5.3)
POTASSIUM SERPL-SCNC: 4.5 MMOL/L — SIGNIFICANT CHANGE UP (ref 3.5–5.3)
POTASSIUM SERPL-SCNC: 4.6 MMOL/L — SIGNIFICANT CHANGE UP (ref 3.5–5.3)
PROTHROM AB SERPL-ACNC: 10.9 SEC — SIGNIFICANT CHANGE UP (ref 10–13.1)
RBC # BLD: 2.85 M/UL — LOW (ref 3.8–5.2)
RBC # FLD: 13.8 % — SIGNIFICANT CHANGE UP (ref 10.3–14.5)
SODIUM SERPL-SCNC: 136 MMOL/L — SIGNIFICANT CHANGE UP (ref 135–145)
SODIUM SERPL-SCNC: 137 MMOL/L — SIGNIFICANT CHANGE UP (ref 135–145)
WBC # BLD: 3.14 K/UL — LOW (ref 3.8–10.5)
WBC # FLD AUTO: 3.14 K/UL — LOW (ref 3.8–10.5)

## 2017-09-20 PROCEDURE — 99232 SBSQ HOSP IP/OBS MODERATE 35: CPT

## 2017-09-20 RX ORDER — SEVELAMER CARBONATE 2400 MG/1
3 POWDER, FOR SUSPENSION ORAL
Qty: 270 | Refills: 0 | OUTPATIENT
Start: 2017-09-20 | End: 2017-10-20

## 2017-09-20 RX ORDER — INSULIN LISPRO 100/ML
1 VIAL (ML) SUBCUTANEOUS
Qty: 0 | Refills: 0 | COMMUNITY
Start: 2017-09-20

## 2017-09-20 RX ORDER — METOPROLOL TARTRATE 50 MG
1 TABLET ORAL
Qty: 60 | Refills: 0 | OUTPATIENT
Start: 2017-09-20 | End: 2017-10-20

## 2017-09-20 RX ADMIN — SEVELAMER CARBONATE 2400 MILLIGRAM(S): 2400 POWDER, FOR SUSPENSION ORAL at 12:25

## 2017-09-20 RX ADMIN — OXYCODONE AND ACETAMINOPHEN 1 TABLET(S): 5; 325 TABLET ORAL at 02:13

## 2017-09-20 RX ADMIN — Medication 81 MILLIGRAM(S): at 12:25

## 2017-09-20 RX ADMIN — SEVELAMER CARBONATE 2400 MILLIGRAM(S): 2400 POWDER, FOR SUSPENSION ORAL at 08:52

## 2017-09-20 RX ADMIN — Medication 50 MILLIGRAM(S): at 05:47

## 2017-09-20 RX ADMIN — OXYCODONE AND ACETAMINOPHEN 1 TABLET(S): 5; 325 TABLET ORAL at 01:42

## 2017-09-20 RX ADMIN — Medication 100 MILLIGRAM(S): at 05:47

## 2017-09-20 RX ADMIN — Medication 100 MILLIGRAM(S): at 12:26

## 2017-09-20 RX ADMIN — DULOXETINE HYDROCHLORIDE 60 MILLIGRAM(S): 30 CAPSULE, DELAYED RELEASE ORAL at 12:26

## 2017-09-20 RX ADMIN — LISINOPRIL 40 MILLIGRAM(S): 2.5 TABLET ORAL at 05:47

## 2017-09-20 NOTE — DISCHARGE NOTE ADULT - COMMUNITY RESOURCES
[phone] Please resume your services on 9/21/17 at regularly scheduled time of 9:30am.

## 2017-09-20 NOTE — PROGRESS NOTE ADULT - ASSESSMENT
61 y/o F w/ uncontrolled Type 1 DM w/ ESRD on HD, on insulin pump admitted with DKA/SOB/CP. Pt feeling well with possible discharge home today. BG at goal since back on pump 2 days ago. Settings seem appropriate as previous DKA episode appears to have occurred from an infusion set issue. Reinforced with patient if BG values rising to troubleshoot and may need to change infusion set.

## 2017-09-20 NOTE — DISCHARGE NOTE ADULT - PATIENT PORTAL LINK FT
“You can access the [facility] Patient Portal, offered by [facility], by registering with the following website: [email or web address]”

## 2017-09-20 NOTE — PROGRESS NOTE ADULT - PROBLEM SELECTOR PLAN 2
-reviewed pump settings and history  -c/w current pump settings upon discharge  -discussed w/pt and medicine NP

## 2017-09-20 NOTE — DISCHARGE NOTE ADULT - MEDICATION SUMMARY - MEDICATIONS TO TAKE
I will START or STAY ON the medications listed below when I get home from the hospital:    Percocet 5/325 oral tablet  -- 1 tab(s) by mouth once a day (at bedtime)  -- Indication: For Pain    aspirin 81 mg oral delayed release tablet  -- 1 tab(s) by mouth once a day  -- Indication: For CAD    lisinopril 40 mg oral tablet  -- 1 tab(s) by mouth once a day (at bedtime)  -- Indication: For Essential hypertension    DULoxetine 60 mg oral delayed release capsule  -- 1 cap(s) by mouth once a day  -- Indication: For pain    insulin lispro 100 units/mL subcutaneous solution  -- 1 each subcutaneous   -- Indication: For Type 1 diabetes mellitus with ketoacidosis without coma    atorvastatin 20 mg oral tablet  -- 1 tab(s) by mouth once a day (at bedtime)  -- Indication: For Hyperlipidemia, unspecified hyperlipidemia type    clopidogrel 75 mg oral tablet  -- 1 tab(s) by mouth once a day (at bedtime)  -- Indication: For CAD    metoprolol tartrate 50 mg oral tablet  -- 1 tab(s) by mouth 2 times a day  -- Indication: For Essential hypertension    sevelamer hydrochloride 800 mg oral tablet  -- 3 tab(s) by mouth 3 times a day (with meals)  -- Indication: For ESRD    hydrALAZINE 100 mg oral tablet  -- 1 tab(s) by mouth 3 times a day  -- Indication: For Essential hypertension    Multiple Vitamins oral tablet  -- 1 tab(s) by mouth once a day  -- Indication: For SUPPPLEMENT

## 2017-09-20 NOTE — PROGRESS NOTE ADULT - ASSESSMENT
62 f with   DKA resolved- insulin, endocrine follow noted  ESRD- continue HD, nephrology follow Dr. Schmidt  chest pain resolved-patient with known CAD, stents. Known to Dr. Vela.   Anxiety control  Medically stable-DCe Follow PMD/Nephrology in 2 days  Pierce Viera MD pager [phone]

## 2017-09-20 NOTE — PROGRESS NOTE ADULT - SUBJECTIVE AND OBJECTIVE BOX
Diabetes Follow up note:  Interval Hx:  62 F with PMHx of T1DM on insulin pump and multiple DM complications and comorbidities. Well known by team due to frequent admissions with diabetes or cardiac problems. Here with SOB/CP/DKA now resolved. Pt seen at bedside. Pt w/good appetite-using insulin pump bolus feature appropriately with meals. Anxious to be discharged.    Review of Systems:  General: "feel good"  GI: Tolerating POs without any N/V/D/ABD PAIN.  CV: No CP/SOB  ENDO: No S&Sx of hypoglycemia  MEDS:  atorvastatin 20 milliGRAM(s) Oral at bedtime    insulin lispro (HumaLOG) Pump 1 Each SubCutaneous Continuous Pump  Pump Settings:  Basal: 12am-0.275  5am-0.375  ICR: 1: 15  ISF: 12am-60 7am: 40 6pm: 60  BG Target: 12am: 110-130 8am: 100-120   Active insulin: 6 hours  Reviewed settings and boluses.      Allergies    No Known Allergies        PE:  General: Female sitting in bed. NAD   Vital Signs Last 24 Hrs  T(C): 37.1 (20 Sep 2017 07:29), Max: 37.1 (19 Sep 2017 15:28)  T(F): 98.8 (20 Sep 2017 07:29), Max: 98.8 (19 Sep 2017 15:28)  HR: 70 (20 Sep 2017 07:29) (70 - 79)  BP: 139/78 (20 Sep 2017 07:29) (139/78 - 145/68)  BP(mean): --  RR: 18 (20 Sep 2017 07:29) (16 - 18)  SpO2: 96% (20 Sep 2017 07:29) (92% - 100%)  Abd: Soft, NT,ND, Pump site intact d/i  Extremities: Warm. No edema noted.   Neuro: A&O X3    LABS:  CAPILLARY BLOOD GLUCOSE  96 (09-20 @ 08:31)  131 (09-20 @ 01:57)  187 (09-19 @ 21:52)  145 (09-19 @ 16:45)  118 (09-19 @ 13:10)  167 (09-19 @ 10:45)  166 (09-19 @ 08:26)  133 (09-19 @ 03:00)  117 (09-18 @ 23:45)  99 (09-18 @ 22:30)  141 (09-18 @ 18:00)  143 (09-18 @ 13:00)  153 (09-18 @ 12:00)  167 (09-18 @ 11:00)  123 (09-18 @ 10:00)  105 (09-18 @ 09:00)  118 (09-18 @ 08:00)  156 (09-18 @ 07:00)  149 (09-18 @ 06:00)  255 (09-18 @ 05:00)  273 (09-18 @ 04:00)  355 (09-18 @ 03:00)  446 (09-18 @ 02:00)  588 (09-18 @ 01:00)  591 (09-17 @ 23:00)  563 (09-17 @ 22:00)  252 (09-17 @ 18:00)  197 (09-17 @ 17:00)  237 (09-17 @ 16:00)  250 (09-17 @ 15:00)  200 (09-17 @ 14:00)  151 (09-17 @ 13:00)  111 (09-17 @ 12:00)                            9.1    3.14  )-----------( 242      ( 20 Sep 2017 09:03 )             29.2       09-20    136  |  94<L>  |  18  ----------------------------<  126<H>  4.6   |  24  |  4.63<H>    Ca    8.1<L>      20 Sep 2017 09:06  Phos  3.5     09-20  Mg     2.2     09-20    TPro  6.1  /  Alb  3.3  /  TBili  0.2  /  DBili  x   /  AST  17  /  ALT  9<L>  /  AlkPhos  182<H>  09-19          Hemoglobin A1C, Whole Blood: 6.8 % <H> [4.0 - 5.6] (07-24-17 @ 06:15)  Hemoglobin A1C, Whole Blood: 6.8 % <H> [4.0 - 5.6] (07-23-17 @ 22:45)            Contact number: isabel [phone] or [phone]

## 2017-09-20 NOTE — DISCHARGE NOTE ADULT - PLAN OF CARE
RETURN TO YOUR HD CENTER TOMORROW FOR SCHEDULED TREATMENT. RESOLVED Your next appointment with endocrinologist ([facility] endocrine ph: [phone])/PMD is -   HgA1C this admission -  Make sure you get your HgA1c checked every three months.  If you take oral diabetes medications, check your blood glucose two times a day.  If you take insulin, check your blood glucose before meals and at bedtime.  It's important not to skip any meals.  Keep a log of your blood glucose results and always take it with you to your doctor appointments.  Keep a list of your current medications including injectables and over the counter medications and bring this medication list with you to all your doctor appointments.  If you have not seen your ophthalmologist this year call for appointment.  Check your feet daily for redness, sores, or openings. Do not self treat. If no improvement in two days call your primary care physician for an appointment.  Low blood sugar (hypoglycemia) is a blood sugar below 70mg/dl. Check your blood sugar if you feel signs/symptoms of hypoglycemia. If your blood sugar is below 70 take 15 grams of carbohydrates (ex 4 oz of apple juice, 3-4 glucose tablets, or 4-6 oz of regular soda) wait 15 minutes and repeat blood sugar to make sure it comes up above 70.  If your blood sugar is above 70 and you are due for a meal, have a meal.  If you are not due for a meal have a snack.  This snack helps keeps your blood sugar at a safe range.

## 2017-09-20 NOTE — DISCHARGE NOTE ADULT - CARE PLAN
Principal Discharge DX:	Diabetic ketoacidosis without coma associated with type 1 diabetes mellitus  Goal:	RESOLVED  Instructions for follow-up, activity and diet:	Your next appointment with endocrinologist ([facility] endocrine ph: [phone])/PMD is -   HgA1C this admission -  Make sure you get your HgA1c checked every three months.  If you take oral diabetes medications, check your blood glucose two times a day.  If you take insulin, check your blood glucose before meals and at bedtime.  It's important not to skip any meals.  Keep a log of your blood glucose results and always take it with you to your doctor appointments.  Keep a list of your current medications including injectables and over the counter medications and bring this medication list with you to all your doctor appointments.  If you have not seen your ophthalmologist this year call for appointment.  Check your feet daily for redness, sores, or openings. Do not self treat. If no improvement in two days call your primary care physician for an appointment.  Low blood sugar (hypoglycemia) is a blood sugar below 70mg/dl. Check your blood sugar if you feel signs/symptoms of hypoglycemia. If your blood sugar is below 70 take 15 grams of carbohydrates (ex 4 oz of apple juice, 3-4 glucose tablets, or 4-6 oz of regular soda) wait 15 minutes and repeat blood sugar to make sure it comes up above 70.  If your blood sugar is above 70 and you are due for a meal, have a meal.  If you are not due for a meal have a snack.  This snack helps keeps your blood sugar at a safe range.  Secondary Diagnosis:	ESRD (end stage renal disease) on dialysis  Goal:	RETURN TO YOUR HD CENTER TOMORROW FOR SCHEDULED TREATMENT.  Secondary Diagnosis:	Essential hypertension  Secondary Diagnosis:	Hyperlipidemia, unspecified hyperlipidemia type

## 2017-09-20 NOTE — DISCHARGE NOTE ADULT - MEDICATION SUMMARY - MEDICATIONS TO STOP TAKING
I will STOP taking the medications listed below when I get home from the hospital:    cinacalcet 60 mg oral tablet  -- 1 tab(s) by mouth once a day    Lopressor 1 mg/mL injectable solution  -- 5 milligram(s) injectable every 6 hours

## 2017-09-20 NOTE — DISCHARGE NOTE ADULT - HOSPITAL COURSE
62 year old female with PMHx T1DM on insulin pump, HTN, HLD, former smoker, MI, CAD s/p PCI to RCA and brachytherapy in Aug 2017, dCHF (last TTE- July 2017- mild MR, mild AS, mild-mod TR EF65%) chronic LLE pain and edema in setting of severe PVD s/p L & R fem-pop bypass  graft,  multiple vascular surgery both leg w/ fem-pop bypass revisions , Subclavian vein stenosis left s/p stent, ESRD on HD (Tu/Thr/Sat) via L AVF with oliguria, CVA with memory deficit, depression, chronic pain management for LLE on oxycodone,Admitted with c/o  SOB / CP found to be hyperkalemic w  EKG changes - peaked Twaves, fluid overload requiring HD, and in +AG DKA. 2L removed during HD.  AG 14- and D5W gtt continued.   Pump placed back on, transitioned off insulin drip.  patient hyperglycemic to 590. As per patient stated that insulin pump was not properly connected and patient not receiving insulin since transition from drip.9/18: Anion gap closed. Elevated BP-  BP medications restarted. pt seen by endocrinology and cleared for discharge home with insulin pump.

## 2017-09-20 NOTE — DISCHARGE NOTE ADULT - SECONDARY DIAGNOSIS.
Essential hypertension Hyperlipidemia, unspecified hyperlipidemia type ESRD (end stage renal disease) on dialysis

## 2017-09-20 NOTE — DISCHARGE NOTE ADULT - MEDICATION SUMMARY - MEDICATIONS TO CHANGE
I will SWITCH the dose or number of times a day I take the medications listed below when I get home from the hospital:    metoprolol succinate 100 mg oral tablet, extended release  -- 1 tab(s) by mouth once a day (at bedtime)    sevelamer hydrochloride 800 mg oral tablet  -- 3 tab(s) by mouth every 8 hours

## 2017-09-20 NOTE — PROGRESS NOTE ADULT - PROBLEM SELECTOR PLAN 1
-dka resolved  -c/w current pump settings at discharge.  -pt has supplies for insulin pump for change at bedside-next change due tomorrow 9/21.17  -f/u outpt: Dr Ley (endocrine)-unable to verify appt time and day as office number was busy. [phone]

## 2017-09-20 NOTE — PROGRESS NOTE ADULT - SUBJECTIVE AND OBJECTIVE BOX
Patient is a 62y old  Female who presents with a chief complaint of chest pain, SOB, vomiting (20 Sep 2017 11:21)      SUBJECTIVE / OVERNIGHT EVENTS: Comfortable without new complaints.   Review of Systems  chest pain no  palpitations no  sob no  nausea no  headache no    MEDICATIONS  (STANDING):  aspirin enteric coated 81 milliGRAM(s) Oral daily  heparin  Injectable 5000 Unit(s) SubCutaneous every 12 hours  atorvastatin 20 milliGRAM(s) Oral at bedtime  clopidogrel Tablet 75 milliGRAM(s) Oral at bedtime  influenza   Vaccine 0.5 milliLiter(s) IntraMuscular once  dextrose 5%. 1000 milliLiter(s) (50 mL/Hr) IV Continuous <Continuous>  dextrose 50% Injectable 12.5 Gram(s) IV Push once  dextrose 50% Injectable 25 Gram(s) IV Push once  dextrose 50% Injectable 25 Gram(s) IV Push once  DULoxetine 60 milliGRAM(s) Oral daily  sevelamer hydrochloride 2400 milliGRAM(s) Oral three times a day with meals  insulin lispro (HumaLOG) Pump 1 Each SubCutaneous Continuous Pump  lisinopril 40 milliGRAM(s) Oral daily  hydrALAZINE 100 milliGRAM(s) Oral three times a day  metoprolol 50 milliGRAM(s) Oral two times a day    MEDICATIONS  (PRN):  dextrose Gel 1 Dose(s) Oral once PRN Blood Glucose LESS THAN 70 milliGRAM(s)/deciLiter  glucagon  Injectable 1 milliGRAM(s) IntraMuscular once PRN Glucose <70 milliGRAM(s)/deciLiter  oxyCODONE    5 mG/acetaminophen 325 mG 1 Tablet(s) Oral at bedtime PRN Severe Pain (7 - 10)          PHYSICAL EXAM:  GENERAL: NAD, well-developed  HEAD:  Atraumatic, Normocephalic  EYES: EOMI, PERRLA, conjunctiva and sclera clear  NECK: Supple, No JVD  CHEST/LUNG: Clear to auscultation bilaterally; No wheeze  HEART: Regular rate and rhythm; No murmurs, rubs, or gallops  ABDOMEN: Soft, Nontender, Nondistended; Bowel sounds present  EXTREMITIES:  2+ Peripheral Pulses, No clubbing, cyanosis, or edema  PSYCH: AAOx3  NEUROLOGY: non-focal  SKIN: No rashes or lesions    LABS:                        9.1    3.14  )-----------( 242      ( 20 Sep 2017 09:03 )             29.2     09-20    136  |  94<L>  |  18  ----------------------------<  126<H>  4.6   |  24  |  4.63<H>    Ca    8.1<L>      20 Sep 2017 09:06  Phos  3.5     09-20  Mg     2.2     09-20    TPro  6.1  /  Alb  3.3  /  TBili  0.2  /  DBili  x   /  AST  17  /  ALT  9<L>  /  AlkPhos  182<H>  09-19    PT/INR - ( 20 Sep 2017 09:03 )   PT: 10.9 sec;   INR: 0.97 ratio         PTT - ( 19 Sep 2017 08:53 )  PTT:27.9 sec          RADIOLOGY & ADDITIONAL TESTS:    Imaging Personally Reviewed:    Consultant(s) Notes Reviewed:      Care Discussed with Consultants/Other Providers:

## 2017-10-02 ENCOUNTER — APPOINTMENT (OUTPATIENT)
Dept: CT IMAGING | Facility: IMAGING CENTER | Age: 62
End: 2017-10-02
Payer: MEDICARE

## 2017-10-02 ENCOUNTER — OUTPATIENT (OUTPATIENT)
Dept: OUTPATIENT SERVICES | Facility: HOSPITAL | Age: 62
LOS: 1 days | End: 2017-10-02
Payer: MEDICARE

## 2017-10-02 DIAGNOSIS — Z00.8 ENCOUNTER FOR OTHER GENERAL EXAMINATION: ICD-10-CM

## 2017-10-02 DIAGNOSIS — Z98.89 OTHER SPECIFIED POSTPROCEDURAL STATES: Chronic | ICD-10-CM

## 2017-10-02 PROCEDURE — 71250 CT THORAX DX C-: CPT | Mod: 26

## 2017-10-02 PROCEDURE — 71250 CT THORAX DX C-: CPT

## 2017-10-19 PROCEDURE — 85730 THROMBOPLASTIN TIME PARTIAL: CPT

## 2017-10-19 PROCEDURE — 84703 CHORIONIC GONADOTROPIN ASSAY: CPT

## 2017-10-19 PROCEDURE — 97163 PT EVAL HIGH COMPLEX 45 MIN: CPT

## 2017-10-19 PROCEDURE — 74177 CT ABD & PELVIS W/CONTRAST: CPT

## 2017-10-19 PROCEDURE — 84100 ASSAY OF PHOSPHORUS: CPT

## 2017-10-19 PROCEDURE — 82330 ASSAY OF CALCIUM: CPT

## 2017-10-19 PROCEDURE — 71275 CT ANGIOGRAPHY CHEST: CPT

## 2017-10-19 PROCEDURE — 82435 ASSAY OF BLOOD CHLORIDE: CPT

## 2017-10-19 PROCEDURE — 93971 EXTREMITY STUDY: CPT

## 2017-10-19 PROCEDURE — 86704 HEP B CORE ANTIBODY TOTAL: CPT

## 2017-10-19 PROCEDURE — 85610 PROTHROMBIN TIME: CPT

## 2017-10-19 PROCEDURE — 83605 ASSAY OF LACTIC ACID: CPT

## 2017-10-19 PROCEDURE — 82803 BLOOD GASES ANY COMBINATION: CPT

## 2017-10-19 PROCEDURE — 80048 BASIC METABOLIC PNL TOTAL CA: CPT

## 2017-10-19 PROCEDURE — 83921 ORGANIC ACID SINGLE QUANT: CPT

## 2017-10-19 PROCEDURE — 71045 X-RAY EXAM CHEST 1 VIEW: CPT

## 2017-10-19 PROCEDURE — 82010 KETONE BODYS QUAN: CPT

## 2017-10-19 PROCEDURE — 82962 GLUCOSE BLOOD TEST: CPT

## 2017-10-19 PROCEDURE — 82947 ASSAY GLUCOSE BLOOD QUANT: CPT

## 2017-10-19 PROCEDURE — 83690 ASSAY OF LIPASE: CPT

## 2017-10-19 PROCEDURE — 86706 HEP B SURFACE ANTIBODY: CPT

## 2017-10-19 PROCEDURE — 84484 ASSAY OF TROPONIN QUANT: CPT

## 2017-10-19 PROCEDURE — 99261: CPT

## 2017-10-19 PROCEDURE — 80053 COMPREHEN METABOLIC PANEL: CPT

## 2017-10-19 PROCEDURE — 84132 ASSAY OF SERUM POTASSIUM: CPT

## 2017-10-19 PROCEDURE — 99285 EMERGENCY DEPT VISIT HI MDM: CPT | Mod: 25

## 2017-10-19 PROCEDURE — 94640 AIRWAY INHALATION TREATMENT: CPT

## 2017-10-19 PROCEDURE — 84295 ASSAY OF SERUM SODIUM: CPT

## 2017-10-19 PROCEDURE — 83880 ASSAY OF NATRIURETIC PEPTIDE: CPT

## 2017-10-19 PROCEDURE — 83735 ASSAY OF MAGNESIUM: CPT

## 2017-10-19 PROCEDURE — 93454 CORONARY ARTERY ANGIO S&I: CPT | Mod: 59

## 2017-10-19 PROCEDURE — 82550 ASSAY OF CK (CPK): CPT

## 2017-10-19 PROCEDURE — 86901 BLOOD TYPING SEROLOGIC RH(D): CPT

## 2017-10-19 PROCEDURE — 82553 CREATINE MB FRACTION: CPT

## 2017-10-19 PROCEDURE — 86803 HEPATITIS C AB TEST: CPT

## 2017-10-19 PROCEDURE — 82009 KETONE BODYS QUAL: CPT

## 2017-10-19 PROCEDURE — C1887: CPT

## 2017-10-19 PROCEDURE — 96376 TX/PRO/DX INJ SAME DRUG ADON: CPT

## 2017-10-19 PROCEDURE — 93005 ELECTROCARDIOGRAM TRACING: CPT

## 2017-10-19 PROCEDURE — 85014 HEMATOCRIT: CPT

## 2017-10-19 PROCEDURE — 86900 BLOOD TYPING SEROLOGIC ABO: CPT

## 2017-10-19 PROCEDURE — 96375 TX/PRO/DX INJ NEW DRUG ADDON: CPT

## 2017-10-19 PROCEDURE — 74176 CT ABD & PELVIS W/O CONTRAST: CPT

## 2017-10-19 PROCEDURE — 96375 TX/PRO/DX INJ NEW DRUG ADDON: CPT | Mod: XU

## 2017-10-19 PROCEDURE — 82607 VITAMIN B-12: CPT

## 2017-10-19 PROCEDURE — 86850 RBC ANTIBODY SCREEN: CPT

## 2017-10-19 PROCEDURE — C1725: CPT

## 2017-10-19 PROCEDURE — 85027 COMPLETE CBC AUTOMATED: CPT

## 2017-10-19 PROCEDURE — 87340 HEPATITIS B SURFACE AG IA: CPT

## 2017-10-19 PROCEDURE — 96374 THER/PROPH/DIAG INJ IV PUSH: CPT

## 2017-10-19 PROCEDURE — 96374 THER/PROPH/DIAG INJ IV PUSH: CPT | Mod: XU

## 2017-10-19 PROCEDURE — 82746 ASSAY OF FOLIC ACID SERUM: CPT

## 2017-10-19 PROCEDURE — 93306 TTE W/DOPPLER COMPLETE: CPT

## 2017-10-19 PROCEDURE — C9606: CPT | Mod: RC

## 2017-10-19 PROCEDURE — 80202 ASSAY OF VANCOMYCIN: CPT

## 2017-10-19 PROCEDURE — C1769: CPT

## 2017-10-19 PROCEDURE — C1894: CPT

## 2017-10-19 PROCEDURE — 83930 ASSAY OF BLOOD OSMOLALITY: CPT

## 2017-10-19 PROCEDURE — 83036 HEMOGLOBIN GLYCOSYLATED A1C: CPT

## 2017-10-19 PROCEDURE — 87040 BLOOD CULTURE FOR BACTERIA: CPT

## 2017-10-19 PROCEDURE — C1874: CPT

## 2017-10-25 ENCOUNTER — APPOINTMENT (OUTPATIENT)
Dept: ELECTROPHYSIOLOGY | Facility: CLINIC | Age: 62
End: 2017-10-25
Payer: MEDICARE

## 2017-10-25 ENCOUNTER — NON-APPOINTMENT (OUTPATIENT)
Age: 62
End: 2017-10-25

## 2017-10-25 VITALS
HEART RATE: 76 BPM | OXYGEN SATURATION: 99 % | DIASTOLIC BLOOD PRESSURE: 74 MMHG | SYSTOLIC BLOOD PRESSURE: 182 MMHG | BODY MASS INDEX: 20.49 KG/M2 | HEIGHT: 64 IN | WEIGHT: 120 LBS

## 2017-10-25 PROCEDURE — 93000 ELECTROCARDIOGRAM COMPLETE: CPT

## 2017-10-25 PROCEDURE — 99204 OFFICE O/P NEW MOD 45 MIN: CPT

## 2017-10-25 RX ORDER — ATORVASTATIN CALCIUM 20 MG/1
20 TABLET, FILM COATED ORAL
Qty: 30 | Refills: 0 | Status: ACTIVE | COMMUNITY
Start: 2017-09-01

## 2017-10-25 RX ORDER — INSULIN LISPRO 100 [IU]/ML
100 INJECTION, SOLUTION INTRAVENOUS; SUBCUTANEOUS
Qty: 20 | Refills: 0 | Status: ACTIVE | COMMUNITY
Start: 2017-10-16

## 2017-10-25 RX ORDER — BLOOD SUGAR DIAGNOSTIC
STRIP MISCELLANEOUS
Qty: 200 | Refills: 0 | Status: ACTIVE | COMMUNITY
Start: 2017-08-28

## 2017-10-25 RX ORDER — METOPROLOL SUCCINATE 50 MG/1
50 TABLET, EXTENDED RELEASE ORAL DAILY
Qty: 90 | Refills: 3 | Status: ACTIVE | COMMUNITY
Start: 2017-10-25

## 2017-10-25 RX ORDER — CLOPIDOGREL BISULFATE 75 MG/1
75 TABLET, FILM COATED ORAL
Qty: 30 | Refills: 0 | Status: ACTIVE | COMMUNITY
Start: 2017-10-16

## 2017-10-25 RX ORDER — LISINOPRIL 40 MG/1
40 TABLET ORAL
Qty: 90 | Refills: 0 | Status: ACTIVE | COMMUNITY
Start: 2017-10-24

## 2017-10-25 RX ORDER — FUROSEMIDE 40 MG/1
40 TABLET ORAL
Refills: 0 | Status: ACTIVE | COMMUNITY
Start: 2017-10-25

## 2017-10-25 RX ORDER — SEVELAMER CARBONATE 800 MG/1
800 TABLET, FILM COATED ORAL
Qty: 270 | Refills: 0 | Status: ACTIVE | COMMUNITY
Start: 2016-10-06

## 2017-10-25 RX ORDER — METOPROLOL TARTRATE 50 MG/1
50 TABLET, FILM COATED ORAL
Qty: 60 | Refills: 0 | Status: COMPLETED | COMMUNITY
Start: 2017-09-20

## 2017-10-25 RX ORDER — HYDRALAZINE HYDROCHLORIDE 25 MG/1
25 TABLET ORAL
Qty: 360 | Refills: 0 | Status: ACTIVE | COMMUNITY
Start: 2017-09-01

## 2017-10-25 RX ORDER — OXYCODONE AND ACETAMINOPHEN 5; 325 MG/1; MG/1
5-325 TABLET ORAL
Qty: 60 | Refills: 0 | Status: ACTIVE | COMMUNITY
Start: 2017-08-16

## 2017-10-25 RX ORDER — METOPROLOL SUCCINATE 100 MG/1
100 TABLET, EXTENDED RELEASE ORAL
Qty: 30 | Refills: 0 | Status: COMPLETED | COMMUNITY
Start: 2017-08-03

## 2017-10-25 RX ORDER — RENAGEL 800 MG/1
800 TABLET ORAL
Qty: 270 | Refills: 0 | Status: DISCONTINUED | COMMUNITY
Start: 2017-09-20 | End: 2017-10-25

## 2017-11-08 ENCOUNTER — APPOINTMENT (OUTPATIENT)
Dept: NUCLEAR MEDICINE | Facility: IMAGING CENTER | Age: 62
End: 2017-11-08
Payer: MEDICARE

## 2017-11-08 ENCOUNTER — APPOINTMENT (OUTPATIENT)
Dept: NUCLEAR MEDICINE | Facility: IMAGING CENTER | Age: 62
End: 2017-11-08

## 2017-11-08 ENCOUNTER — OUTPATIENT (OUTPATIENT)
Dept: OUTPATIENT SERVICES | Facility: HOSPITAL | Age: 62
LOS: 1 days | End: 2017-11-08
Payer: MEDICARE

## 2017-11-08 DIAGNOSIS — Z00.8 ENCOUNTER FOR OTHER GENERAL EXAMINATION: ICD-10-CM

## 2017-11-08 DIAGNOSIS — Z98.89 OTHER SPECIFIED POSTPROCEDURAL STATES: Chronic | ICD-10-CM

## 2017-11-08 PROCEDURE — 78815 PET IMAGE W/CT SKULL-THIGH: CPT

## 2017-11-08 PROCEDURE — 78815 PET IMAGE W/CT SKULL-THIGH: CPT | Mod: 26,PI

## 2017-11-08 PROCEDURE — A9552: CPT

## 2017-11-24 ENCOUNTER — INPATIENT (INPATIENT)
Facility: HOSPITAL | Age: 62
LOS: 4 days | Discharge: HOME | DRG: 246 | End: 2017-11-29
Attending: INTERNAL MEDICINE | Admitting: SPECIALIST
Payer: MEDICARE

## 2017-11-24 VITALS
OXYGEN SATURATION: 100 % | SYSTOLIC BLOOD PRESSURE: 124 MMHG | HEART RATE: 101 BPM | DIASTOLIC BLOOD PRESSURE: 67 MMHG | RESPIRATION RATE: 18 BRPM

## 2017-11-24 DIAGNOSIS — E13.10 OTHER SPECIFIED DIABETES MELLITUS WITH KETOACIDOSIS WITHOUT COMA: ICD-10-CM

## 2017-11-24 DIAGNOSIS — Z98.89 OTHER SPECIFIED POSTPROCEDURAL STATES: Chronic | ICD-10-CM

## 2017-11-24 LAB
ALBUMIN SERPL ELPH-MCNC: 5.1 G/DL — HIGH (ref 3.3–5)
ALP SERPL-CCNC: 289 U/L — HIGH (ref 40–120)
ALT FLD-CCNC: 9 U/L RC — LOW (ref 10–45)
ANION GAP SERPL CALC-SCNC: 39 MMOL/L — HIGH (ref 5–17)
AST SERPL-CCNC: 19 U/L — SIGNIFICANT CHANGE UP (ref 10–40)
B-OH-BUTYR SERPL-SCNC: 8.2 MMOL/L — HIGH
BILIRUB SERPL-MCNC: 0.3 MG/DL — SIGNIFICANT CHANGE UP (ref 0.2–1.2)
BUN SERPL-MCNC: 59 MG/DL — HIGH (ref 7–23)
CALCIUM SERPL-MCNC: 9.5 MG/DL — SIGNIFICANT CHANGE UP (ref 8.4–10.5)
CHLORIDE SERPL-SCNC: 76 MMOL/L — LOW (ref 96–108)
CO2 SERPL-SCNC: 13 MMOL/L — LOW (ref 22–31)
CREAT SERPL-MCNC: 9.13 MG/DL — HIGH (ref 0.5–1.3)
GAS PNL BLDV: SIGNIFICANT CHANGE UP
GLUCOSE SERPL-MCNC: 724 MG/DL — CRITICAL HIGH (ref 70–99)
HCT VFR BLD CALC: 38.6 % — SIGNIFICANT CHANGE UP (ref 34.5–45)
HGB BLD-MCNC: 12.5 G/DL — SIGNIFICANT CHANGE UP (ref 11.5–15.5)
MCHC RBC-ENTMCNC: 32.3 GM/DL — SIGNIFICANT CHANGE UP (ref 32–36)
MCHC RBC-ENTMCNC: 35.1 PG — HIGH (ref 27–34)
MCV RBC AUTO: 109 FL — HIGH (ref 80–100)
PLATELET # BLD AUTO: 301 K/UL — SIGNIFICANT CHANGE UP (ref 150–400)
POTASSIUM SERPL-MCNC: 7.1 MMOL/L — CRITICAL HIGH (ref 3.5–5.3)
POTASSIUM SERPL-SCNC: 7.1 MMOL/L — CRITICAL HIGH (ref 3.5–5.3)
PROT SERPL-MCNC: 8 G/DL — SIGNIFICANT CHANGE UP (ref 6–8.3)
RBC # BLD: 3.55 M/UL — LOW (ref 3.8–5.2)
RBC # FLD: 13.1 % — SIGNIFICANT CHANGE UP (ref 10.3–14.5)
SODIUM SERPL-SCNC: 128 MMOL/L — LOW (ref 135–145)
TROPONIN T SERPL-MCNC: 0.66 NG/ML — HIGH (ref 0–0.06)
WBC # BLD: 8.8 K/UL — SIGNIFICANT CHANGE UP (ref 3.8–10.5)
WBC # FLD AUTO: 8.8 K/UL — SIGNIFICANT CHANGE UP (ref 3.8–10.5)

## 2017-11-24 PROCEDURE — 71010: CPT | Mod: 26

## 2017-11-24 PROCEDURE — 99291 CRITICAL CARE FIRST HOUR: CPT | Mod: 25,GC

## 2017-11-24 PROCEDURE — 93010 ELECTROCARDIOGRAM REPORT: CPT

## 2017-11-24 RX ORDER — INSULIN HUMAN 100 [IU]/ML
0.5 INJECTION, SOLUTION SUBCUTANEOUS
Qty: 100 | Refills: 0 | Status: DISCONTINUED | OUTPATIENT
Start: 2017-11-24 | End: 2017-11-25

## 2017-11-24 RX ORDER — CALCIUM CHLORIDE
500 POWDER (GRAM) MISCELLANEOUS ONCE
Qty: 0 | Refills: 0 | Status: COMPLETED | OUTPATIENT
Start: 2017-11-24 | End: 2017-11-24

## 2017-11-24 RX ORDER — DEXTROSE 50 % IN WATER 50 %
50 SYRINGE (ML) INTRAVENOUS ONCE
Qty: 0 | Refills: 0 | Status: COMPLETED | OUTPATIENT
Start: 2017-11-24 | End: 2017-11-24

## 2017-11-24 RX ORDER — ASPIRIN/CALCIUM CARB/MAGNESIUM 324 MG
324 TABLET ORAL ONCE
Qty: 0 | Refills: 0 | Status: COMPLETED | OUTPATIENT
Start: 2017-11-24 | End: 2017-11-24

## 2017-11-24 RX ORDER — SODIUM CHLORIDE 9 MG/ML
1000 INJECTION INTRAMUSCULAR; INTRAVENOUS; SUBCUTANEOUS ONCE
Qty: 0 | Refills: 0 | Status: COMPLETED | OUTPATIENT
Start: 2017-11-24 | End: 2017-11-24

## 2017-11-24 RX ORDER — ALBUTEROL 90 UG/1
2.5 AEROSOL, METERED ORAL ONCE
Qty: 0 | Refills: 0 | Status: COMPLETED | OUTPATIENT
Start: 2017-11-24 | End: 2017-11-24

## 2017-11-24 RX ORDER — HEPARIN SODIUM 5000 [USP'U]/ML
INJECTION INTRAVENOUS; SUBCUTANEOUS
Qty: 25000 | Refills: 0 | Status: DISCONTINUED | OUTPATIENT
Start: 2017-11-24 | End: 2017-11-25

## 2017-11-24 RX ORDER — HEPARIN SODIUM 5000 [USP'U]/ML
3200 INJECTION INTRAVENOUS; SUBCUTANEOUS EVERY 6 HOURS
Qty: 0 | Refills: 0 | Status: DISCONTINUED | OUTPATIENT
Start: 2017-11-24 | End: 2017-11-25

## 2017-11-24 RX ORDER — HEPARIN SODIUM 5000 [USP'U]/ML
3200 INJECTION INTRAVENOUS; SUBCUTANEOUS ONCE
Qty: 0 | Refills: 0 | Status: DISCONTINUED | OUTPATIENT
Start: 2017-11-24 | End: 2017-11-25

## 2017-11-24 RX ORDER — ALBUTEROL 90 UG/1
10 AEROSOL, METERED ORAL ONCE
Qty: 0 | Refills: 0 | Status: COMPLETED | OUTPATIENT
Start: 2017-11-24 | End: 2017-11-24

## 2017-11-24 RX ORDER — INSULIN HUMAN 100 [IU]/ML
10 INJECTION, SOLUTION SUBCUTANEOUS ONCE
Qty: 0 | Refills: 0 | Status: COMPLETED | OUTPATIENT
Start: 2017-11-24 | End: 2017-11-24

## 2017-11-24 RX ADMIN — INSULIN HUMAN 6 UNIT(S)/HR: 100 INJECTION, SOLUTION SUBCUTANEOUS at 23:33

## 2017-11-24 RX ADMIN — ALBUTEROL 2.5 MILLIGRAM(S): 90 AEROSOL, METERED ORAL at 23:27

## 2017-11-24 RX ADMIN — Medication 50 MILLILITER(S): at 22:57

## 2017-11-24 RX ADMIN — Medication 500 MILLIGRAM(S): at 23:06

## 2017-11-24 RX ADMIN — Medication 500 MILLIGRAM(S): at 22:56

## 2017-11-24 RX ADMIN — ONDANSETRON 4 MILLIGRAM(S): 8 TABLET, FILM COATED ORAL at 23:40

## 2017-11-24 RX ADMIN — SODIUM CHLORIDE 1000 MILLILITER(S): 9 INJECTION INTRAMUSCULAR; INTRAVENOUS; SUBCUTANEOUS at 21:45

## 2017-11-24 RX ADMIN — INSULIN HUMAN 10 UNIT(S): 100 INJECTION, SOLUTION SUBCUTANEOUS at 22:58

## 2017-11-24 NOTE — H&P ADULT - ASSESSMENT
62 year old female with PMHx T1DM on insulin pump, HTN, HLD, former smoker, MI, CAD s/p PCI to RCA and brachytherapy in Aug 2017, dCHF (last TTE- July 2017- mild MR, mild AS, mild-mod TR EF65%) chronic LLE pain and edema in setting of severe PVD s/p L & R fem-pop bypass  graft,  multiple vascular surgery both leg w/ fem-pop bypass revisions , Subclavian vein stenosis left s/p stent, ESRD on HD (Tu/Thr/Sat) via L AVF with oliguria, CVA with memory deficit, depression, chronic pain management for LLE on oxycodone who presents with chest pain and malaise found to have K of 7, and in DKA.

## 2017-11-24 NOTE — H&P ADULT - PROBLEM SELECTOR PLAN 2
Elevated to 7 likely multifactorial with acidosis, dka, miss hd. positive EKG changes with peaked T, s/p CaCl x2  - Will trend K, Renal called for urgent HD, aware and order in   - Monitor on tele, may need Ca again for ekg changes.\  - treat DKA

## 2017-11-24 NOTE — ED PROVIDER NOTE - PROGRESS NOTE DETAILS
MICU Consulted. Paged dr Daisy Schmidt. - Jesse Rodarte, Resident MICU resident and Cardiology Fellow at bedside. [facility] Cardiology fellow has spoken with house interventionalist (Dr. Quiros): No emergency cardiac catheterization. EKG more probable to be results of hyperkalemia then progression of CAD. Will heparin ggt, Brilanta, ASA. Renal notified for emergency dialysis. Bed ready in MICU. ADRY.

## 2017-11-24 NOTE — H&P ADULT - NSHPLABSRESULTS_GEN_ALL_CORE
12.5   8.8   )-----------( 301      ( 24 Nov 2017 21:56 )             38.6     11-24    128<L>  |  76<L>  |  59<H>  ----------------------------<  724<HH>  7.1<HH>   |  13<L>  |  9.13<H>    Ca    9.5      24 Nov 2017 21:56    TPro  8.0  /  Alb  5.1<H>  /  TBili  0.3  /  DBili  x   /  AST  19  /  ALT  9<L>  /  AlkPhos  289<H>  11-24      Lactate Trend      CARDIAC MARKERS ( 24 Nov 2017 21:56 )  x     / 0.66 ng/mL / 276 U/L / x     / 7.8 ng/mL        CAPILLARY BLOOD GLUCOSE      POCT Blood Glucose.: >600 mg/dL (24 Nov 2017 23:00)            RADIOLOGY, EKG & ADDITIONAL TESTS: Reviewed.

## 2017-11-24 NOTE — H&P ADULT - PROBLEM SELECTOR PLAN 1
In DKA, elevated bicarb, elevated K, low Na, positive beta hydroxy and lactate, acidosis. Meets moderate DKA. S/p IV insulin 10 units, on drip at 6. No need to corrected electrolytes before gtt. Unclear cause but frequent history of DKA in the past. HAGMA. No clear etiology of dka. Will monitor for infections.  - Q4 hour BMP, Mg, phos,  - On insulin gtt, will follow protocol  - check a1c  - NPO, Will give another dose of IVF given hemoconcentration.

## 2017-11-24 NOTE — H&P ADULT - HISTORY OF PRESENT ILLNESS
62 year old female with PMHx T1DM on insulin pump, HTN, HLD, former smoker, MI, CAD s/p PCI to RCA and brachytherapy in Aug 2017, dCHF (last TTE- July 2017- mild MR, mild AS, mild-mod TR EF65%) chronic LLE pain and edema in setting of severe PVD s/p L & R fem-pop bypass  graft,  multiple vascular surgery both leg w/ fem-pop bypass revisions , Subclavian vein stenosis left s/p stent, ESRD on HD (Tu/Thr/Sat) via L AVF with oliguria, CVA with memory deficit, depression, chronic pain management for LLE on oxycodone who presents with chest pain and malaise found to have K of 7, and in DKA.    The patient has multiple admissions for similar presentation. Patient has a new pump, states it was working. States she has been compliant with her meds and did following dietary restriction. She was on HD on T/Th/Sat, but for this week was changed to M/W/F, she did not go for HD today. She started to have chest pain today with some n/v. Sub sternal on and off pain. No diaphoresis. C/O some mid-epigastric abn pain. Had similar chest pain with her previous MI 5 stents in the past. Stents in aug 17th to RCA. Presented to ED found to have K of 7, peaked T waves, with elevations in AVR, house cards was consulted, nephrology called for urgent HD. GIven albuterol nebs, calcium chloride x2, insulins IVP with D50 and started on insulin gtt.     Cards at bedside. House cards (fellow) does not think cath is needed, not ACS, ED trying to get in touch with private cards dr. Vela

## 2017-11-24 NOTE — ED PROVIDER NOTE - CARE PLAN
Principal Discharge DX:	DKA (diabetic ketoacidoses)  Secondary Diagnosis:	Hyperkalemia  Secondary Diagnosis:	EKG abnormalities

## 2017-11-24 NOTE — ED ADULT NURSE REASSESSMENT NOTE - NS ED NURSE REASSESS COMMENT FT1
2335 - insulin pump started on pt. at 6 units/hr. Pt.'s insulin pump removed and given to family. Co-signed by another RN.

## 2017-11-24 NOTE — ED PROVIDER NOTE - ATTENDING CONTRIBUTION TO CARE
62 year old female with PMHx T1DM on insulin pump, HTN, HLD, former smoker, MI, CAD s/p PCI to RCA and brachytherapy in Aug 2017, dCHF (last TTE- July 2017- mild MR, mild AS, mild-mod TR EF65%) chronic LLE pain and edema in setting of severe PVD s/p L & R fem-pop bypass  graft,  multiple vascular surgery both leg w/ fem-pop bypass revisions , Subclavian vein stenosis left s/p stent, ESRD on HD presents with elevated finger stick, lactate and k with similar presentations in the past, ekg no changes c/o cp, n/v weakness, last hd wed two days ago, m,w,s this week due to holidays. small fluid bolus given, endo consult, cardiac rule out. 62 year old female with PMHx T1DM on insulin pump, HTN, HLD, former smoker, MI, CAD s/p PCI to RCA and brachytherapy in Aug 2017, dCHF (last TTE- July 2017- mild MR, mild AS, mild-mod TR EF65%) chronic LLE pain and edema in setting of severe PVD s/p L & R fem-pop bypass  graft,  multiple vascular surgery both leg w/ fem-pop bypass revisions , Subclavian vein stenosis left s/p stent, ESRD on HD presents with elevated finger stick, lactate and k with similar presentations in the past, ekg no changes c/o cp, n/v weakness, last hd wed two days ago, m,w,s this week due to holidays. small fluid bolus given, endo consult, cardiac rule out. ekg shows st elevation as well in avr, v1 and v2 stemi consult called.

## 2017-11-24 NOTE — ED ADULT NURSE REASSESSMENT NOTE - NS ED NURSE REASSESS COMMENT FT1
6723 - Endocrinology contacted for consult at [phone] -spoke w/ Emma regarding pt.'s insulin pump. As per Emma, on call MD will be notified.

## 2017-11-24 NOTE — H&P ADULT - PROBLEM SELECTOR PLAN 5
Epigastric pain, history of chronic abn pain, on narcotics, currently no abn pain.   - lipase  - if persistent will check imaging.

## 2017-11-24 NOTE — ED PROVIDER NOTE - CONSTITUTIONAL, MLM
normal... ill appearing, ketotic smell, awake, oriented to person, place, time/situation and in no apparent distress.

## 2017-11-24 NOTE — ED ADULT NURSE NOTE - OBJECTIVE STATEMENT
63 y/o F, A&Ox3, enters ED by EMS for c/o chest pain. Hx. of MI, HTN, DM, ESRD. Pt. has 5 stents, on dialysis Tue-Wed-Sat. Fistula in left arm. Pt. reports chest pain that started earlier today. Poor historian when describing type of pain. Pt. reports difficulty breathing and SOB  - o2 % on RA. Pt. presents tachypneic. Finger stick done - >>600. MD aware. EKG done. On CM. No fever/chills/n/v. 61 y/o F, A&Ox3, enters ED by EMS for c/o chest pain. Hx. of MI, HTN, DM, ESRD. Pt. has 5 stents, on dialysis Tue-Wed-Sat. Fistula in left arm. Pt. reports chest pain that started earlier today. Poor historian when describing type of pain. Pt. reports difficulty breathing and SOB  - o2 % on RA. Pt. presents tachypneic. Finger stick done - >>600. MD aware. Pt. has insulin pump. No fever/chills/n/v. Skin warm, dry and intact. Safety maintained. Family at bedside. 63 y/o F, A&Ox3, enters ED by EMS for c/o chest pain. Hx. of MI, HTN, DM, ESRD. Pt. has 5 stents, on dialysis Tues-Thurs-Sat, but changed it to Tues-Wed.-Sat. due to the holiday this week. Fistula in left arm. Pt. reports chest pain that started earlier today. Poor historian when describing type of pain. Pt. reports difficulty breathing and SOB  - o2 % on RA. Pt. presents tachypneic. Finger stick done - >>600. MD aware. Pt. has insulin pump. No fever/chills/n/v. Skin warm, dry and intact. Safety maintained. Family at bedside. 61 y/o F, A&Ox3, enters ED by EMS for c/o chest pain. Hx. of MI, HTN, DM, ESRD. Pt. has 5 stents, on dialysis Tues-Thurs-Sat, but changed it to Tues-Wed.-Sat. due to the holiday this week. Fistula in left arm. Pt. reports chest pain that started earlier today. Poor historian when describing type of pain. Pt. reports difficulty breathing and SOB  - O2 % on RA. Pt. presents tachypneic. Finger stick done - >>600. MD aware. Pt. has insulin pump. No fever/chills/n/v. Skin warm, dry and intact. Safety maintained. Family at bedside. 61 y/o F, A&Ox3, enters ED by EMS for c/o chest pain. Hx. of MI, HTN, DM, ESRD. Pt. has 5 stents, on dialysis Tues-Thurs-Sat, but changed it to Tues-Wed.-Sat. due to the holiday this week. Fistula in left arm. Pt. reports chest pain that started earlier today. Poor historian when describing type of pain. Pt. reports difficulty breathing and SOB  - O2 % on RA. Pt. presents tachypneic. Finger stick done - >>600. MD aware. Pt. has insulin pump. No fever/chills/n/v. Pt. tender to right quadrant. Skin warm, dry and intact. Safety maintained. Family at bedside. 61 y/o F, A&Ox3, enters ED by EMS for c/o chest pain. Hx. of MI, HTN, DM, ESRD. Pt. has 5 stents, on dialysis Tues-Thurs-Sat, but changed it to Tues-Wed.-Sat. due to the holiday this week. Fistula in left arm. Pt. states she does not make urine. Pt. reports chest pain that started earlier today. Poor historian when describing type of pain. Pt. reports difficulty breathing and SOB  - O2 % on RA. Pt. presents tachypneic. Finger stick done - >>600. MD aware. Pt. has insulin pump. No fever/chills/n/v. Pt. tender to right quadrant. Skin warm, dry and intact. Safety maintained. Family at bedside.

## 2017-11-24 NOTE — H&P ADULT - PROBLEM SELECTOR PLAN 4
Chest pain, freuqent hx of atypical chest pain, 5 stents in the past, most recent in aug, on asa/plavix. Spoke with Cards ([facility]), no need for cath currently.  - Asa/plavix/statins/BB  - Hep until next enzymes, cards states can be d/c if CE stable or lower  - monitor on tele  - f/u cards consult.

## 2017-11-24 NOTE — H&P ADULT - NSHPREVIEWOFSYSTEMS_GEN_ALL_CORE
REVIEW OF SYSTEMS  General: no sweating; fever; chills; anorexia; weight loss: malaise  Skin/Breast: no rash; no itching; no dryness	  ENMT: no visual changes, headaches.   Respiratory and Thorax: no wheezing; no dyspnea; no cough; no hemoptysis, no sputum production, no stridor	  Cardiovascular: Chest pain.   Gastrointestinal:  no melena; no hematochezia; no jaundice; no diarrhea, N/V, abn pain.   Genitourinary: anuric  Musculoskeletal: no arthralgia; no arthritis; no joint swelling; no myalgia, chronic RLE pain.   Neurological: no weakness; no headache; no loss of consciousness; no confusion  Psychiatric: no suicidal ideation; no depression;  Hematology/Lymphatics: no gum bleeding; no nose bleeding; no skin lumps  Endocrine: No heat/cold intolerance, no polydipsia, or polyuria.

## 2017-11-24 NOTE — CONSULT NOTE ADULT - SUBJECTIVE AND OBJECTIVE BOX
asked to consult on this pt for hyperkalemia asked to consult on this pt for hyperkalemia.   [facility]  [phone]  NEPHROLOGY      INITIAL CONSULT NOTE  --------------------------------------------------------------------------------  HPI:    63 yo F with hx DM, dka esrd cad htn PAD presented to er with 3 days c/o N/V and abd pain. last hd yesterday---> ER. glu > 700 k 7.1. hco3- 13. renal consult called.     PAST HISTORY  --------------------------------------------------------------------------------  PAST MEDICAL & SURGICAL HISTORY:  Subclavian vein stenosis, left: s/p stent  Cellulitis: 2015 left foot  DKA, type 1: 1/2015  ACS (acute coronary syndrome): 1/2015 - cath revealed 100% ostial stenosis not amenable to PCI - medical management  MI, old  TIA (transient ischemic attack): x 2 - 8-9 years ago prior to ASD/VSD repair  CAD (coronary artery disease): s/p stents  Murmur, cardiac  Gout: past  CVA (cerebral infarction): with no residual, 8 yrs ago, prior to heart surgery - ST memory loss  Peripheral vascular disease: occluded left fem-pop bypass 5/2015  Diabetes mellitus type 1: Insulin Dependent - Medtronic  Minimed Paradigm Insulin Pump - Novolog  ESRD (end stage renal disease): dialysis , tue, thursday, saturday  Hyperlipidemia  Status post device closure of ASD: &quot;clamshell&quot;  History of cardiac catheterization: 1/2015 - no intervention  S/P femoral-popliteal bypass surgery: L and R in 2013 with graft; 5/2015 CFA angioplasty left and ileofemoral endarterectomywith vein patch angioplasty of left fem-pop bypass graft  Multiple vascular surgery both leg, left fempop bypass revision 11/2015  AV (arteriovenous fistula): Left AV graft; revision with stent placement 2-3 years ago  S/P cholecystectomy    FAMILY HISTORY:  Family history of smoking  Family history of hypertension  Family history of cancer (Sibling)    PAST SOCIAL HISTORY:   currently no tobacco use but second hand tobacco use     ALLERGIES & MEDICATIONS  --------------------------------------------------------------------------------  Allergies    No Known Allergies    Intolerances      Standing Inpatient Medications  heparin  Infusion.  Unit(s)/Hr IV Continuous <Continuous>  heparin  Injectable 3200 Unit(s) IV Push once  insulin Infusion 6 Unit(s)/Hr IV Continuous <Continuous>    PRN Inpatient Medications  heparin  Injectable 3200 Unit(s) IV Push every 6 hours PRN      REVIEW OF SYSTEMS  --------------------------------------------------------------------------------  Gen: No  fevers/chills  Skin: No rashes  Head/Eyes/Ears/Mouth: No headache; Normal hearing; decrease vision ; No sinus pain/discomfort, sore throat  Respiratory: + dyspnea, -cough, -wheezing, -hemoptysis  CV: No chest pain, PND, orthopnea  GI: + abdominal pain, -diarrhea,+nausea, +  vomiting, - melena,-hematochezia  : No dysuria,   MSK: No joint pain/swelling; no back pain; no edema  Neuro: No dizziness/lightheadedness,      All other systems were reviewed and are negative, except as noted.    VITALS/PHYSICAL EXAM  --------------------------------------------------------------------------------  T(C): 36.7 (11-24-17 @ 20:59), Max: 36.7 (11-24-17 @ 20:59)  HR: 112 (11-24-17 @ 23:27) (94 - 112)  BP: 137/66 (11-24-17 @ 23:27) (102/52 - 141/117)  RR: 31 (11-24-17 @ 23:27) (18 - 31)  SpO2: 100% (11-24-17 @ 20:59) (100% - 100%)  Wt(kg): --    Weight (kg): 54.431 (11-24-17 @ 23:50)      Physical Exam:  	Gen: ill appearing F. with tachypnea   	no jvd supple neck,  	Pulm: mild decrease bs no rales no ronchi no wheezing  	CV: RRR, S1S2; no rub  	Back: No spinal or CVA tenderness; no sacral edema  	Abd: + hypoactive bs soft tender right side abd mild guarding ??   	: No suprapubic tenderness  	ext: Warm, no edema no c/c/  	Neuro: overall remains lethargic but alert and oriented   	Skin: Warm, decrease skin turgor  	Vascular access: LUE avf + bruit and thrill      LABS/STUDIES  --------------------------------------------------------------------------------              12.5   8.8   >-----------<  301      [11-24-17 @ 21:56]              38.6     128  |  76  |  59  ----------------------------<  724      [11-24-17 @ 21:56]  7.1   |  13  |  9.13        Ca     9.5     [11-24-17 @ 21:56]    TPro  8.0  /  Alb  5.1  /  TBili  0.3  /  DBili  x   /  AST  19  /  ALT  9   /  AlkPhos  289  [11-24-17 @ 21:56]        Troponin 0.66      [11-24-17 @ 21:56]        [11-24-17 @ 21:56]    Creatinine Trend:  SCr 9.13 [11-24 @ 21:56]    Urinalysis - [06-04-17 @ 08:24]      Color Yellow / Appearance Clear / SG 1.013 / pH 8.5      Gluc 1000 / Ketone Negative  / Bili Negative / Urobili Negative       Blood Negative / Protein >600 / Leuk Est Small / Nitrite Negative      RBC 3-5 / WBC 6-10 / Hyaline  / Gran  / Sq Epi  / Non Sq Epi OCC / Bacteria       HbA1c 6.8      [07-24-17 @ 06:15]    HBsAb <3.0      [09-19-17 @ 12:42]  HBsAb Nonreact      [05-02-15 @ 15:35]  HBsAg Nonreact      [09-19-17 @ 12:42]  HBcAb Nonreact      [09-19-17 @ 12:42]  HCV 0.16, Nonreact      [08-19-17 @ 04:24]      RESULTS:   · EKG Date/Time: 24-Nov-2017 22:35  · Rate: 96  · Interpretation: normal sinus rhythm  · ST/T Wave: peaked t waves  T wave peaked v2/v3/v/4 STelevation v1   · Other Findings: irbbb

## 2017-11-24 NOTE — ED ADULT NURSE REASSESSMENT NOTE - NS ED NURSE REASSESS COMMENT FT1
pt. given 10 units IV regular insulin w/ another RN as per MD's orders. FS done prior to administration - FS read 'HI." As per VBG, glucose was 760. As per MD, okay to give albuterol treatment even though pt. is tachycardic to 111. 2300 - pt. given 10 units IV regular insulin w/ another RN as per MD's orders. FS done prior to administration - FS read 'HI." As per VBG, glucose was 760. As per MD, okay to give albuterol treatment even though pt. is tachycardic to 111. Pt. given 1g calcium chloride IV push over 7 min. Pt. remained on CM. 2300 - pt. given 10 units IV regular insulin w/ another RN as per MD's orders. FS done prior to administration - FS read 'HI." As per VBG, glucose was 760. As per MD, okay to give albuterol treatment even though pt. is tachycardic to 111. Pt. given 1g calcium chloride IV push over 7 min. Pt. remained on CM.  2335 - post dextrose administration, pt. became nauseous and vomited. MD aware. pt. given 4 mg IV zofran as per MD's orders

## 2017-11-24 NOTE — H&P ADULT - NSHPPHYSICALEXAM_GEN_ALL_CORE
PHYSICAL EXAM:    Constitutional: well-developed; well-groomed  Eyes: PERRL; EOMI  ENMT: Normal oropharnxy, no oral lesions, no erythema, no exudates  Neck:  Supple; no JVD  MSK/Back: chronic RLE pain, tenderness. No effusions, no erythema of joint, decreased LE pulses, chronic.   Respiratory: airway patent; breath sounds equal; good air movement, no wheezing, no crackles, no rhonchi. no increase in WOB  Cardiovascular: regular rate and rhythm  no rub , no murmur, no gallops.   Gastrointestinal: soft; no distention, normal BS, mild TTP in the epigastric area.   Extremities: no clubbing; no cyanosis; no pedal edema, TTP chronic, decreased pulses.   Neurological: alert and oriented x 3; responds to pain; responds to verbal commands; sensation intact: CN nerves grossly intact.   Skin: warm and dry; color normal: no rash: no ulcers  Psychiatric: Calm, no SI/HI

## 2017-11-24 NOTE — H&P ADULT - ATTENDING COMMENTS
62F Hx DM on insulin pump, CAD s/p PCI to RCA and brachytherapy in Aug 2017, HFpEF 65%, chronic LLE pain associated with severe PVD s/p Bilat fem-pop bypass graft,  multiple vascular surgery both leg w/ fem-pop bypass revisions , Lt Subclavian vein stenosis stent, ESRD, chronic pain syndrome on oxycodone, multiple hospitalization for DKA, ACS presents with hyperkalemia, chest pain, Anion gap metabolic acidosis and DKA.  - Admit to ICU for DKA management with Insulin gtt protocol  - Renal HD for hyperkalemia S/P hyper-K protocol given in ED  - Ischemic heart disease follow up with EKG and serial cardiac enzyme; doubt any acute PCI needed   - Pain control     Critical Care Time = 45 min

## 2017-11-24 NOTE — ED PROVIDER NOTE - NOTES
STEMI activation: Pt's private physician, per cardiology team cath lab activation must go through his office. 2nd page

## 2017-11-24 NOTE — ED PROVIDER NOTE - OBJECTIVE STATEMENT
62F PMHD ESRD on HD MWF last HD on WED, DM on insulin pump, pw 1 day of nausea vomiting and generalized weakness a/w chest pressure. Ches5t pain is sternal pressure, non positional. aw difficulty breathing. pain is 8/10.

## 2017-11-25 DIAGNOSIS — E87.5 HYPERKALEMIA: ICD-10-CM

## 2017-11-25 DIAGNOSIS — E78.5 HYPERLIPIDEMIA, UNSPECIFIED: ICD-10-CM

## 2017-11-25 DIAGNOSIS — N18.6 END STAGE RENAL DISEASE: ICD-10-CM

## 2017-11-25 DIAGNOSIS — Z29.9 ENCOUNTER FOR PROPHYLACTIC MEASURES, UNSPECIFIED: ICD-10-CM

## 2017-11-25 DIAGNOSIS — E13.10 OTHER SPECIFIED DIABETES MELLITUS WITH KETOACIDOSIS WITHOUT COMA: ICD-10-CM

## 2017-11-25 DIAGNOSIS — I25.10 ATHEROSCLEROTIC HEART DISEASE OF NATIVE CORONARY ARTERY WITHOUT ANGINA PECTORIS: ICD-10-CM

## 2017-11-25 DIAGNOSIS — I73.9 PERIPHERAL VASCULAR DISEASE, UNSPECIFIED: ICD-10-CM

## 2017-11-25 DIAGNOSIS — R10.9 UNSPECIFIED ABDOMINAL PAIN: ICD-10-CM

## 2017-11-25 DIAGNOSIS — I10 ESSENTIAL (PRIMARY) HYPERTENSION: ICD-10-CM

## 2017-11-25 DIAGNOSIS — E10.10 TYPE 1 DIABETES MELLITUS WITH KETOACIDOSIS WITHOUT COMA: ICD-10-CM

## 2017-11-25 LAB
ANION GAP SERPL CALC-SCNC: 14 MMOL/L — SIGNIFICANT CHANGE UP (ref 5–17)
ANION GAP SERPL CALC-SCNC: 17 MMOL/L — SIGNIFICANT CHANGE UP (ref 5–17)
ANION GAP SERPL CALC-SCNC: 20 MMOL/L — HIGH (ref 5–17)
ANION GAP SERPL CALC-SCNC: 22 MMOL/L — HIGH (ref 5–17)
ANION GAP SERPL CALC-SCNC: 25 MMOL/L — HIGH (ref 5–17)
ANION GAP SERPL CALC-SCNC: 45 MMOL/L — HIGH (ref 5–17)
APTT BLD: 23.8 SEC — LOW (ref 27.5–37.4)
APTT BLD: 25.4 SEC — LOW (ref 27.5–37.4)
APTT BLD: 31.9 SEC — SIGNIFICANT CHANGE UP (ref 27.5–37.4)
B-OH-BUTYR SERPL-SCNC: 2.8 MMOL/L — HIGH
BASE EXCESS BLDV CALC-SCNC: -12.8 MMOL/L — LOW (ref -2–2)
BASE EXCESS BLDV CALC-SCNC: 1 MMOL/L — SIGNIFICANT CHANGE UP (ref -2–2)
BASE EXCESS BLDV CALC-SCNC: 1 MMOL/L — SIGNIFICANT CHANGE UP (ref -2–2)
BUN SERPL-MCNC: 22 MG/DL — SIGNIFICANT CHANGE UP (ref 7–23)
BUN SERPL-MCNC: 23 MG/DL — SIGNIFICANT CHANGE UP (ref 7–23)
BUN SERPL-MCNC: 23 MG/DL — SIGNIFICANT CHANGE UP (ref 7–23)
BUN SERPL-MCNC: 24 MG/DL — HIGH (ref 7–23)
BUN SERPL-MCNC: 26 MG/DL — HIGH (ref 7–23)
BUN SERPL-MCNC: 65 MG/DL — HIGH (ref 7–23)
CA-I SERPL-SCNC: 1.08 MMOL/L — LOW (ref 1.12–1.3)
CA-I SERPL-SCNC: 1.09 MMOL/L — LOW (ref 1.12–1.3)
CA-I SERPL-SCNC: 1.11 MMOL/L — LOW (ref 1.12–1.3)
CALCIUM SERPL-MCNC: 8 MG/DL — LOW (ref 8.4–10.5)
CALCIUM SERPL-MCNC: 8.1 MG/DL — LOW (ref 8.4–10.5)
CALCIUM SERPL-MCNC: 8.6 MG/DL — SIGNIFICANT CHANGE UP (ref 8.4–10.5)
CALCIUM SERPL-MCNC: 8.6 MG/DL — SIGNIFICANT CHANGE UP (ref 8.4–10.5)
CALCIUM SERPL-MCNC: 8.7 MG/DL — SIGNIFICANT CHANGE UP (ref 8.4–10.5)
CALCIUM SERPL-MCNC: 9.5 MG/DL — SIGNIFICANT CHANGE UP (ref 8.4–10.5)
CHLORIDE BLDV-SCNC: 88 MMOL/L — LOW (ref 96–108)
CHLORIDE BLDV-SCNC: 97 MMOL/L — SIGNIFICANT CHANGE UP (ref 96–108)
CHLORIDE BLDV-SCNC: 97 MMOL/L — SIGNIFICANT CHANGE UP (ref 96–108)
CHLORIDE SERPL-SCNC: 78 MMOL/L — LOW (ref 96–108)
CHLORIDE SERPL-SCNC: 92 MMOL/L — LOW (ref 96–108)
CHLORIDE SERPL-SCNC: 93 MMOL/L — LOW (ref 96–108)
CHLORIDE SERPL-SCNC: 94 MMOL/L — LOW (ref 96–108)
CHLORIDE SERPL-SCNC: 95 MMOL/L — LOW (ref 96–108)
CHLORIDE SERPL-SCNC: 96 MMOL/L — SIGNIFICANT CHANGE UP (ref 96–108)
CK MB BLD-MCNC: 4.2 % — HIGH (ref 0–3.5)
CK MB BLD-MCNC: 9.3 % — HIGH (ref 0–3.5)
CK MB CFR SERPL CALC: 10.9 NG/ML — HIGH (ref 0–3.8)
CK MB CFR SERPL CALC: 39.7 NG/ML — HIGH (ref 0–3.8)
CK SERPL-CCNC: 261 U/L — HIGH (ref 25–170)
CK SERPL-CCNC: 426 U/L — HIGH (ref 25–170)
CO2 BLDV-SCNC: 13 MMOL/L — LOW (ref 22–30)
CO2 BLDV-SCNC: 27 MMOL/L — SIGNIFICANT CHANGE UP (ref 22–30)
CO2 BLDV-SCNC: 27 MMOL/L — SIGNIFICANT CHANGE UP (ref 22–30)
CO2 SERPL-SCNC: 10 MMOL/L — CRITICAL LOW (ref 22–31)
CO2 SERPL-SCNC: 16 MMOL/L — LOW (ref 22–31)
CO2 SERPL-SCNC: 19 MMOL/L — LOW (ref 22–31)
CO2 SERPL-SCNC: 19 MMOL/L — LOW (ref 22–31)
CO2 SERPL-SCNC: 24 MMOL/L — SIGNIFICANT CHANGE UP (ref 22–31)
CO2 SERPL-SCNC: 24 MMOL/L — SIGNIFICANT CHANGE UP (ref 22–31)
CREAT SERPL-MCNC: 4.28 MG/DL — HIGH (ref 0.5–1.3)
CREAT SERPL-MCNC: 4.63 MG/DL — HIGH (ref 0.5–1.3)
CREAT SERPL-MCNC: 5.05 MG/DL — HIGH (ref 0.5–1.3)
CREAT SERPL-MCNC: 5.18 MG/DL — HIGH (ref 0.5–1.3)
CREAT SERPL-MCNC: 5.47 MG/DL — HIGH (ref 0.5–1.3)
CREAT SERPL-MCNC: 9.51 MG/DL — HIGH (ref 0.5–1.3)
GAS PNL BLDV: 129 MMOL/L — LOW (ref 136–145)
GAS PNL BLDV: 132 MMOL/L — LOW (ref 136–145)
GAS PNL BLDV: 132 MMOL/L — LOW (ref 136–145)
GAS PNL BLDV: SIGNIFICANT CHANGE UP
GLUCOSE BLDC GLUCOMTR-MCNC: 104 MG/DL — HIGH (ref 70–99)
GLUCOSE BLDC GLUCOMTR-MCNC: 122 MG/DL — HIGH (ref 70–99)
GLUCOSE BLDC GLUCOMTR-MCNC: 124 MG/DL — HIGH (ref 70–99)
GLUCOSE BLDC GLUCOMTR-MCNC: 128 MG/DL — HIGH (ref 70–99)
GLUCOSE BLDC GLUCOMTR-MCNC: 167 MG/DL — HIGH (ref 70–99)
GLUCOSE BLDC GLUCOMTR-MCNC: 179 MG/DL — HIGH (ref 70–99)
GLUCOSE BLDC GLUCOMTR-MCNC: 183 MG/DL — HIGH (ref 70–99)
GLUCOSE BLDC GLUCOMTR-MCNC: 184 MG/DL — HIGH (ref 70–99)
GLUCOSE BLDC GLUCOMTR-MCNC: 186 MG/DL — HIGH (ref 70–99)
GLUCOSE BLDC GLUCOMTR-MCNC: 189 MG/DL — HIGH (ref 70–99)
GLUCOSE BLDC GLUCOMTR-MCNC: 200 MG/DL — HIGH (ref 70–99)
GLUCOSE BLDC GLUCOMTR-MCNC: 202 MG/DL — HIGH (ref 70–99)
GLUCOSE BLDC GLUCOMTR-MCNC: 203 MG/DL — HIGH (ref 70–99)
GLUCOSE BLDC GLUCOMTR-MCNC: 204 MG/DL — HIGH (ref 70–99)
GLUCOSE BLDC GLUCOMTR-MCNC: 207 MG/DL — HIGH (ref 70–99)
GLUCOSE BLDC GLUCOMTR-MCNC: 222 MG/DL — HIGH (ref 70–99)
GLUCOSE BLDC GLUCOMTR-MCNC: 259 MG/DL — HIGH (ref 70–99)
GLUCOSE BLDC GLUCOMTR-MCNC: 275 MG/DL — HIGH (ref 70–99)
GLUCOSE BLDC GLUCOMTR-MCNC: 289 MG/DL — HIGH (ref 70–99)
GLUCOSE BLDC GLUCOMTR-MCNC: 345 MG/DL — HIGH (ref 70–99)
GLUCOSE BLDC GLUCOMTR-MCNC: 351 MG/DL — HIGH (ref 70–99)
GLUCOSE BLDC GLUCOMTR-MCNC: 408 MG/DL — HIGH (ref 70–99)
GLUCOSE BLDC GLUCOMTR-MCNC: 79 MG/DL — SIGNIFICANT CHANGE UP (ref 70–99)
GLUCOSE BLDC GLUCOMTR-MCNC: 86 MG/DL — SIGNIFICANT CHANGE UP (ref 70–99)
GLUCOSE BLDC GLUCOMTR-MCNC: >600 MG/DL — CRITICAL HIGH (ref 70–99)
GLUCOSE BLDV-MCNC: 136 MG/DL — HIGH (ref 70–99)
GLUCOSE BLDV-MCNC: 211 MG/DL — HIGH (ref 70–99)
GLUCOSE BLDV-MCNC: 594 MG/DL — CRITICAL HIGH (ref 70–99)
GLUCOSE SERPL-MCNC: 123 MG/DL — HIGH (ref 70–99)
GLUCOSE SERPL-MCNC: 144 MG/DL — HIGH (ref 70–99)
GLUCOSE SERPL-MCNC: 208 MG/DL — HIGH (ref 70–99)
GLUCOSE SERPL-MCNC: 227 MG/DL — HIGH (ref 70–99)
GLUCOSE SERPL-MCNC: 384 MG/DL — HIGH (ref 70–99)
GLUCOSE SERPL-MCNC: 644 MG/DL — CRITICAL HIGH (ref 70–99)
HAV IGM SER-ACNC: SIGNIFICANT CHANGE UP
HBA1C BLD-MCNC: 8.5 % — HIGH (ref 4–5.6)
HBV CORE AB SER-ACNC: SIGNIFICANT CHANGE UP
HBV CORE IGM SER-ACNC: SIGNIFICANT CHANGE UP
HBV SURFACE AB SER-ACNC: <3 MIU/ML — LOW
HBV SURFACE AG SER-ACNC: SIGNIFICANT CHANGE UP
HCO3 BLDV-SCNC: 12 MMOL/L — LOW (ref 21–29)
HCO3 BLDV-SCNC: 26 MMOL/L — SIGNIFICANT CHANGE UP (ref 21–29)
HCO3 BLDV-SCNC: 26 MMOL/L — SIGNIFICANT CHANGE UP (ref 21–29)
HCT VFR BLD CALC: 29.2 % — LOW (ref 34.5–45)
HCT VFR BLDA CALC: 31 % — LOW (ref 39–50)
HCT VFR BLDA CALC: 32 % — LOW (ref 39–50)
HCT VFR BLDA CALC: 33 % — LOW (ref 39–50)
HCV AB S/CO SERPL IA: 0.14 S/CO — SIGNIFICANT CHANGE UP
HCV AB SERPL-IMP: SIGNIFICANT CHANGE UP
HGB BLD CALC-MCNC: 10 G/DL — LOW (ref 11.5–15.5)
HGB BLD CALC-MCNC: 10.2 G/DL — LOW (ref 11.5–15.5)
HGB BLD CALC-MCNC: 10.6 G/DL — LOW (ref 11.5–15.5)
HGB BLD-MCNC: 9.9 G/DL — LOW (ref 11.5–15.5)
HOROWITZ INDEX BLDV+IHG-RTO: 21 — SIGNIFICANT CHANGE UP
HOROWITZ INDEX BLDV+IHG-RTO: 21 — SIGNIFICANT CHANGE UP
INR BLD: 1.04 RATIO — SIGNIFICANT CHANGE UP (ref 0.88–1.16)
LACTATE BLDV-MCNC: 1.6 MMOL/L — SIGNIFICANT CHANGE UP (ref 0.7–2)
LACTATE BLDV-MCNC: 2.7 MMOL/L — HIGH (ref 0.7–2)
LACTATE BLDV-MCNC: 7.8 MMOL/L — CRITICAL HIGH (ref 0.7–2)
LIDOCAIN IGE QN: 31 U/L — SIGNIFICANT CHANGE UP (ref 7–60)
MAGNESIUM SERPL-MCNC: 2.2 MG/DL — SIGNIFICANT CHANGE UP (ref 1.6–2.6)
MAGNESIUM SERPL-MCNC: 2.3 MG/DL — SIGNIFICANT CHANGE UP (ref 1.6–2.6)
MAGNESIUM SERPL-MCNC: 2.4 MG/DL — SIGNIFICANT CHANGE UP (ref 1.6–2.6)
MAGNESIUM SERPL-MCNC: 2.7 MG/DL — HIGH (ref 1.6–2.6)
MCHC RBC-ENTMCNC: 34.1 GM/DL — SIGNIFICANT CHANGE UP (ref 32–36)
MCHC RBC-ENTMCNC: 35.3 PG — HIGH (ref 27–34)
MCV RBC AUTO: 104 FL — HIGH (ref 80–100)
OTHER CELLS CSF MANUAL: 10 ML/DL — LOW (ref 18–22)
OTHER CELLS CSF MANUAL: 11 ML/DL — LOW (ref 18–22)
OTHER CELLS CSF MANUAL: 14 ML/DL — LOW (ref 18–22)
PCO2 BLDV: 26 MMHG — LOW (ref 35–50)
PCO2 BLDV: 44 MMHG — SIGNIFICANT CHANGE UP (ref 35–50)
PCO2 BLDV: 45 MMHG — SIGNIFICANT CHANGE UP (ref 35–50)
PH BLDV: 7.3 — LOW (ref 7.35–7.45)
PH BLDV: 7.38 — SIGNIFICANT CHANGE UP (ref 7.35–7.45)
PH BLDV: 7.38 — SIGNIFICANT CHANGE UP (ref 7.35–7.45)
PHOSPHATE SERPL-MCNC: 3.8 MG/DL — SIGNIFICANT CHANGE UP (ref 2.5–4.5)
PHOSPHATE SERPL-MCNC: 3.8 MG/DL — SIGNIFICANT CHANGE UP (ref 2.5–4.5)
PHOSPHATE SERPL-MCNC: 4.4 MG/DL — SIGNIFICANT CHANGE UP (ref 2.5–4.5)
PHOSPHATE SERPL-MCNC: 4.9 MG/DL — HIGH (ref 2.5–4.5)
PHOSPHATE SERPL-MCNC: 5.1 MG/DL — HIGH (ref 2.5–4.5)
PHOSPHATE SERPL-MCNC: 5.4 MG/DL — HIGH (ref 2.5–4.5)
PLATELET # BLD AUTO: 251 K/UL — SIGNIFICANT CHANGE UP (ref 150–400)
PO2 BLDV: 100 MMHG — HIGH (ref 25–45)
PO2 BLDV: 37 MMHG — SIGNIFICANT CHANGE UP (ref 25–45)
PO2 BLDV: 47 MMHG — HIGH (ref 25–45)
POTASSIUM BLDV-SCNC: 4.3 MMOL/L — SIGNIFICANT CHANGE UP (ref 3.5–5)
POTASSIUM SERPL-MCNC: 4.5 MMOL/L — SIGNIFICANT CHANGE UP (ref 3.5–5.3)
POTASSIUM SERPL-MCNC: 4.6 MMOL/L — SIGNIFICANT CHANGE UP (ref 3.5–5.3)
POTASSIUM SERPL-MCNC: 4.8 MMOL/L — SIGNIFICANT CHANGE UP (ref 3.5–5.3)
POTASSIUM SERPL-MCNC: 8.4 MMOL/L — CRITICAL HIGH (ref 3.5–5.3)
POTASSIUM SERPL-SCNC: 4.5 MMOL/L — SIGNIFICANT CHANGE UP (ref 3.5–5.3)
POTASSIUM SERPL-SCNC: 4.6 MMOL/L — SIGNIFICANT CHANGE UP (ref 3.5–5.3)
POTASSIUM SERPL-SCNC: 4.8 MMOL/L — SIGNIFICANT CHANGE UP (ref 3.5–5.3)
POTASSIUM SERPL-SCNC: 8.4 MMOL/L — CRITICAL HIGH (ref 3.5–5.3)
PROTHROM AB SERPL-ACNC: 11.3 SEC — SIGNIFICANT CHANGE UP (ref 9.8–12.7)
RBC # BLD: 2.81 M/UL — LOW (ref 3.8–5.2)
RBC # FLD: 12.6 % — SIGNIFICANT CHANGE UP (ref 10.3–14.5)
SAO2 % BLDV: 68 % — SIGNIFICANT CHANGE UP (ref 67–88)
SAO2 % BLDV: 82 % — SIGNIFICANT CHANGE UP (ref 67–88)
SAO2 % BLDV: 96 % — HIGH (ref 67–88)
SODIUM SERPL-SCNC: 126 MMOL/L — LOW (ref 135–145)
SODIUM SERPL-SCNC: 133 MMOL/L — LOW (ref 135–145)
SODIUM SERPL-SCNC: 133 MMOL/L — LOW (ref 135–145)
SODIUM SERPL-SCNC: 135 MMOL/L — SIGNIFICANT CHANGE UP (ref 135–145)
SODIUM SERPL-SCNC: 136 MMOL/L — SIGNIFICANT CHANGE UP (ref 135–145)
SODIUM SERPL-SCNC: 140 MMOL/L — SIGNIFICANT CHANGE UP (ref 135–145)
TROPONIN T SERPL-MCNC: 0.64 NG/ML — HIGH (ref 0–0.06)
TROPONIN T SERPL-MCNC: 2.09 NG/ML — HIGH (ref 0–0.06)
WBC # BLD: 7.8 K/UL — SIGNIFICANT CHANGE UP (ref 3.8–10.5)
WBC # FLD AUTO: 7.8 K/UL — SIGNIFICANT CHANGE UP (ref 3.8–10.5)

## 2017-11-25 PROCEDURE — 93010 ELECTROCARDIOGRAM REPORT: CPT

## 2017-11-25 PROCEDURE — 99222 1ST HOSP IP/OBS MODERATE 55: CPT | Mod: GC

## 2017-11-25 PROCEDURE — 99291 CRITICAL CARE FIRST HOUR: CPT

## 2017-11-25 RX ORDER — HEPARIN SODIUM 5000 [USP'U]/ML
950 INJECTION INTRAVENOUS; SUBCUTANEOUS
Qty: 25000 | Refills: 0 | Status: DISCONTINUED | OUTPATIENT
Start: 2017-11-25 | End: 2017-11-26

## 2017-11-25 RX ORDER — HEPARIN SODIUM 5000 [USP'U]/ML
INJECTION INTRAVENOUS; SUBCUTANEOUS
Qty: 25000 | Refills: 0 | Status: DISCONTINUED | OUTPATIENT
Start: 2017-11-25 | End: 2017-11-25

## 2017-11-25 RX ORDER — OXYCODONE AND ACETAMINOPHEN 5; 325 MG/1; MG/1
1 TABLET ORAL ONCE
Qty: 0 | Refills: 0 | Status: DISCONTINUED | OUTPATIENT
Start: 2017-11-25 | End: 2017-11-26

## 2017-11-25 RX ORDER — ASPIRIN/CALCIUM CARB/MAGNESIUM 324 MG
81 TABLET ORAL DAILY
Qty: 0 | Refills: 0 | Status: DISCONTINUED | OUTPATIENT
Start: 2017-11-25 | End: 2017-11-29

## 2017-11-25 RX ORDER — OXYCODONE AND ACETAMINOPHEN 5; 325 MG/1; MG/1
1 TABLET ORAL ONCE
Qty: 0 | Refills: 0 | Status: DISCONTINUED | OUTPATIENT
Start: 2017-11-25 | End: 2017-11-25

## 2017-11-25 RX ORDER — SODIUM CHLORIDE 9 MG/ML
1000 INJECTION, SOLUTION INTRAVENOUS
Qty: 0 | Refills: 0 | Status: DISCONTINUED | OUTPATIENT
Start: 2017-11-25 | End: 2017-11-25

## 2017-11-25 RX ORDER — DULOXETINE HYDROCHLORIDE 30 MG/1
60 CAPSULE, DELAYED RELEASE ORAL DAILY
Qty: 0 | Refills: 0 | Status: DISCONTINUED | OUTPATIENT
Start: 2017-11-25 | End: 2017-11-29

## 2017-11-25 RX ORDER — INFLUENZA VIRUS VACCINE 15; 15; 15; 15 UG/.5ML; UG/.5ML; UG/.5ML; UG/.5ML
0.5 SUSPENSION INTRAMUSCULAR ONCE
Qty: 0 | Refills: 0 | Status: DISCONTINUED | OUTPATIENT
Start: 2017-11-25 | End: 2017-11-29

## 2017-11-25 RX ORDER — CLOPIDOGREL BISULFATE 75 MG/1
75 TABLET, FILM COATED ORAL AT BEDTIME
Qty: 0 | Refills: 0 | Status: DISCONTINUED | OUTPATIENT
Start: 2017-11-25 | End: 2017-11-29

## 2017-11-25 RX ORDER — METOPROLOL TARTRATE 50 MG
50 TABLET ORAL
Qty: 0 | Refills: 0 | Status: DISCONTINUED | OUTPATIENT
Start: 2017-11-25 | End: 2017-11-29

## 2017-11-25 RX ORDER — INSULIN HUMAN 100 [IU]/ML
1 INJECTION, SOLUTION SUBCUTANEOUS
Qty: 100 | Refills: 0 | Status: DISCONTINUED | OUTPATIENT
Start: 2017-11-25 | End: 2017-11-26

## 2017-11-25 RX ORDER — ONDANSETRON 8 MG/1
4 TABLET, FILM COATED ORAL ONCE
Qty: 0 | Refills: 0 | Status: COMPLETED | OUTPATIENT
Start: 2017-11-25 | End: 2017-11-24

## 2017-11-25 RX ORDER — HEPARIN SODIUM 5000 [USP'U]/ML
3200 INJECTION INTRAVENOUS; SUBCUTANEOUS ONCE
Qty: 0 | Refills: 0 | Status: COMPLETED | OUTPATIENT
Start: 2017-11-25 | End: 2017-11-25

## 2017-11-25 RX ORDER — DEXTROSE 10 % IN WATER 10 %
1000 INTRAVENOUS SOLUTION INTRAVENOUS
Qty: 0 | Refills: 0 | Status: DISCONTINUED | OUTPATIENT
Start: 2017-11-25 | End: 2017-11-26

## 2017-11-25 RX ORDER — HEPARIN SODIUM 5000 [USP'U]/ML
3200 INJECTION INTRAVENOUS; SUBCUTANEOUS EVERY 6 HOURS
Qty: 0 | Refills: 0 | Status: DISCONTINUED | OUTPATIENT
Start: 2017-11-25 | End: 2017-11-25

## 2017-11-25 RX ORDER — SODIUM CHLORIDE 9 MG/ML
500 INJECTION, SOLUTION INTRAVENOUS
Qty: 0 | Refills: 0 | Status: COMPLETED | OUTPATIENT
Start: 2017-11-25 | End: 2017-11-25

## 2017-11-25 RX ORDER — ONDANSETRON 8 MG/1
4 TABLET, FILM COATED ORAL ONCE
Qty: 0 | Refills: 0 | Status: COMPLETED | OUTPATIENT
Start: 2017-11-25 | End: 2017-11-25

## 2017-11-25 RX ORDER — DEXTROSE 10 % IN WATER 10 %
500 INTRAVENOUS SOLUTION INTRAVENOUS
Qty: 0 | Refills: 0 | Status: DISCONTINUED | OUTPATIENT
Start: 2017-11-25 | End: 2017-11-25

## 2017-11-25 RX ORDER — DEXTROSE 50 % IN WATER 50 %
50 SYRINGE (ML) INTRAVENOUS ONCE
Qty: 0 | Refills: 0 | Status: COMPLETED | OUTPATIENT
Start: 2017-11-25 | End: 2017-11-25

## 2017-11-25 RX ORDER — HYDROMORPHONE HYDROCHLORIDE 2 MG/ML
0.5 INJECTION INTRAMUSCULAR; INTRAVENOUS; SUBCUTANEOUS ONCE
Qty: 0 | Refills: 0 | Status: DISCONTINUED | OUTPATIENT
Start: 2017-11-25 | End: 2017-11-25

## 2017-11-25 RX ORDER — CALCIUM GLUCONATE 100 MG/ML
1 VIAL (ML) INTRAVENOUS ONCE
Qty: 0 | Refills: 0 | Status: COMPLETED | OUTPATIENT
Start: 2017-11-25 | End: 2017-11-26

## 2017-11-25 RX ORDER — SODIUM CHLORIDE 9 MG/ML
500 INJECTION INTRAMUSCULAR; INTRAVENOUS; SUBCUTANEOUS ONCE
Qty: 0 | Refills: 0 | Status: COMPLETED | OUTPATIENT
Start: 2017-11-25 | End: 2017-11-25

## 2017-11-25 RX ORDER — ATORVASTATIN CALCIUM 80 MG/1
20 TABLET, FILM COATED ORAL AT BEDTIME
Qty: 0 | Refills: 0 | Status: DISCONTINUED | OUTPATIENT
Start: 2017-11-25 | End: 2017-11-29

## 2017-11-25 RX ORDER — SEVELAMER CARBONATE 2400 MG/1
400 POWDER, FOR SUSPENSION ORAL
Qty: 0 | Refills: 0 | Status: DISCONTINUED | OUTPATIENT
Start: 2017-11-25 | End: 2017-11-26

## 2017-11-25 RX ORDER — FENTANYL CITRATE 50 UG/ML
25 INJECTION INTRAVENOUS ONCE
Qty: 0 | Refills: 0 | Status: DISCONTINUED | OUTPATIENT
Start: 2017-11-25 | End: 2017-11-25

## 2017-11-25 RX ADMIN — Medication 1 TABLET(S): at 14:31

## 2017-11-25 RX ADMIN — HYDROMORPHONE HYDROCHLORIDE 0.5 MILLIGRAM(S): 2 INJECTION INTRAMUSCULAR; INTRAVENOUS; SUBCUTANEOUS at 01:05

## 2017-11-25 RX ADMIN — SODIUM CHLORIDE 250 MILLILITER(S): 9 INJECTION INTRAMUSCULAR; INTRAVENOUS; SUBCUTANEOUS at 01:46

## 2017-11-25 RX ADMIN — ATORVASTATIN CALCIUM 20 MILLIGRAM(S): 80 TABLET, FILM COATED ORAL at 21:08

## 2017-11-25 RX ADMIN — Medication 20 MILLILITER(S): at 23:13

## 2017-11-25 RX ADMIN — Medication 50 MILLILITER(S): at 16:10

## 2017-11-25 RX ADMIN — Medication 30 MILLILITER(S): at 16:53

## 2017-11-25 RX ADMIN — INSULIN HUMAN 0.5 UNIT(S)/HR: 100 INJECTION, SOLUTION SUBCUTANEOUS at 09:00

## 2017-11-25 RX ADMIN — FENTANYL CITRATE 25 MICROGRAM(S): 50 INJECTION INTRAVENOUS at 08:45

## 2017-11-25 RX ADMIN — FENTANYL CITRATE 25 MICROGRAM(S): 50 INJECTION INTRAVENOUS at 08:15

## 2017-11-25 RX ADMIN — Medication 30 MILLILITER(S): at 23:13

## 2017-11-25 RX ADMIN — HEPARIN SODIUM 3200 UNIT(S): 5000 INJECTION INTRAVENOUS; SUBCUTANEOUS at 07:45

## 2017-11-25 RX ADMIN — ONDANSETRON 4 MILLIGRAM(S): 8 TABLET, FILM COATED ORAL at 01:00

## 2017-11-25 RX ADMIN — HEPARIN SODIUM 800 UNIT(S)/HR: 5000 INJECTION INTRAVENOUS; SUBCUTANEOUS at 14:30

## 2017-11-25 RX ADMIN — SODIUM CHLORIDE 250 MILLILITER(S): 9 INJECTION, SOLUTION INTRAVENOUS at 09:13

## 2017-11-25 RX ADMIN — HYDROMORPHONE HYDROCHLORIDE 0.5 MILLIGRAM(S): 2 INJECTION INTRAMUSCULAR; INTRAVENOUS; SUBCUTANEOUS at 01:35

## 2017-11-25 RX ADMIN — HEPARIN SODIUM 3200 UNIT(S): 5000 INJECTION INTRAVENOUS; SUBCUTANEOUS at 14:30

## 2017-11-25 RX ADMIN — Medication 81 MILLIGRAM(S): at 14:31

## 2017-11-25 RX ADMIN — CLOPIDOGREL BISULFATE 75 MILLIGRAM(S): 75 TABLET, FILM COATED ORAL at 21:08

## 2017-11-25 RX ADMIN — HEPARIN SODIUM 650 UNIT(S)/HR: 5000 INJECTION INTRAVENOUS; SUBCUTANEOUS at 07:45

## 2017-11-25 RX ADMIN — HEPARIN SODIUM 3200 UNIT(S): 5000 INJECTION INTRAVENOUS; SUBCUTANEOUS at 21:15

## 2017-11-25 RX ADMIN — DULOXETINE HYDROCHLORIDE 60 MILLIGRAM(S): 30 CAPSULE, DELAYED RELEASE ORAL at 14:31

## 2017-11-25 RX ADMIN — HEPARIN SODIUM 950 UNIT(S)/HR: 5000 INJECTION INTRAVENOUS; SUBCUTANEOUS at 21:12

## 2017-11-25 RX ADMIN — OXYCODONE AND ACETAMINOPHEN 1 TABLET(S): 5; 325 TABLET ORAL at 00:50

## 2017-11-25 NOTE — CONSULT NOTE ADULT - PROBLEM SELECTOR RECOMMENDATION 9
Continue insulin gtt until patient has decreased insulin requirements and until gap is closed as per DKA protocol. Once patient is ready for transition to basal/bolus she can be started on Lantus 7U (give 1 hour before stopping gtt) and H3U TID plus low dose humalog correctional scale before meals and at bedtime. Once patient is able to obtain supplies she can be transitioned back to insulin pump. Goal glucose 100-180.

## 2017-11-25 NOTE — CHART NOTE - NSCHARTNOTEFT_GEN_A_CORE
Spoke with [facility]. Dr. Vela partner at [facility]. Informed him about positive enzymes, increased to 2 today. Start back hep gtt and possible cath on monday.   Will check ekg, trend enzymes.

## 2017-11-25 NOTE — PROGRESS NOTE ADULT - SUBJECTIVE AND OBJECTIVE BOX
[facility]   [phone]  DIALYSIS NOTE  Chief Complaint: ESRD/Ongoing hemodialysis requirement.   24 hour events/subjective:    in icu. states fs were rising recently at home. abd pain has dissipated         ALLERGIES & MEDICATIONS  --------------------------------------------------------------------------------  Allergies    No Known Allergies    Intolerances      Standing Inpatient Medications  aspirin enteric coated 81 milliGRAM(s) Oral daily  atorvastatin 20 milliGRAM(s) Oral at bedtime  clopidogrel Tablet 75 milliGRAM(s) Oral at bedtime  dextrose 5% + sodium chloride 0.45%. 1000 milliLiter(s) IV Continuous <Continuous>  DULoxetine 60 milliGRAM(s) Oral daily  heparin  Infusion.  Unit(s)/Hr IV Continuous <Continuous>  influenza   Vaccine 0.5 milliLiter(s) IntraMuscular once  insulin Infusion 5 Unit(s)/Hr IV Continuous <Continuous>  metoprolol     tartrate 50 milliGRAM(s) Oral two times a day  Nephro-raphael 1 Tablet(s) Oral daily  sevelamer hydrochloride 400 milliGRAM(s) Oral three times a day with meals    PRN Inpatient Medications  heparin  Injectable 3200 Unit(s) IV Push every 6 hours PRN      REVIEW OF SYSTEMS  --------------------------------------------------------------------------------  no itching or rash  no fever or chill  no cp or palp   no sob or cough   no N/V/D/ no abd pain   ext no edema       VITALS/PHYSICAL EXAM  --------------------------------------------------------------------------------  T(C): 36.8 (11-25-17 @ 08:00), Max: 36.9 (11-25-17 @ 00:45)  HR: 90 (11-25-17 @ 10:00) (90 - 116)  BP: 81/40 (11-25-17 @ 10:00) (81/40 - 177/75)  RR: 17 (11-25-17 @ 10:00) (12 - 38)  SpO2: 96% (11-25-17 @ 10:00) (94% - 100%)  Wt(kg): --  Height (cm): 160.02 (11-25-17 @ 00:45)  Weight (kg): 53.1 (11-25-17 @ 00:45)  BMI (kg/m2): 20.7 (11-25-17 @ 00:45)  BSA (m2): 1.54 (11-25-17 @ 00:45)      11-24-17 @ 07:01  -  11-25-17 @ 07:00  --------------------------------------------------------  IN: 789.5 mL / OUT: 0 mL / NET: 789.5 mL    11-25-17 @ 07:01  -  11-25-17 @ 11:21  --------------------------------------------------------  IN: 554.5 mL / OUT: 0 mL / NET: 554.5 mL      Physical Exam:  		    	Gen: alert oriented still lethargic   	Pulm: Decreased breath sounds b/l bases no rales or ronchi  	CV: RRR, S1/S2  	Abd: +BS, soft, nontender/nondistended  	Extremity: No cyanosis, no edema no clubbing  	  LABS/STUDIES  --------------------------------------------------------------------------------              9.9    7.8   >-----------<  251      [11-25-17 @ 04:05]              29.2     133  |  92  |  23  ----------------------------<  384      [11-25-17 @ 10:29]  4.8   |  16  |  4.63        Ca     9.5     [11-25-17 @ 10:29]      Mg     2.3     [11-25-17 @ 10:29]      Phos  4.9     [11-25-17 @ 10:29]    TPro  8.0  /  Alb  5.1  /  TBili  0.3  /  DBili  x   /  AST  19  /  ALT  9   /  AlkPhos  289  [11-24-17 @ 21:56]    PT/INR: PT 11.3 , INR 1.04       [11-25-17 @ 04:05]  PTT: 23.8       [11-25-17 @ 04:05]    Troponin 2.09      [11-25-17 @ 06:31]        [11-25-17 @ 06:31]            imp/suggest: ESRD      Hemodialysis Prescription:  	Access: avf  	Dialyzer: revaclear 300  	Blood Flow (mL/Min): 400  	Dialysate Flow (mL/Min): 600  	Target UF (Liters): 1 lite r  	Treatment Time: 210 m   	Potassium: 2k   	Calcium: 2.5  	  Endo consult

## 2017-11-25 NOTE — CONSULT NOTE ADULT - SUBJECTIVE AND OBJECTIVE BOX
CARDIOLOGY CONSULT NOTE - DR. PORTILLO    CHIEF COMPLAINT/REASON FOR CONSULT:    HPI:  Patient is a 62 year old woman with history of T1DM on insulin pump, HTN, HLD, former smoker, MI, CAD s/p PCI to RCA and brachytherapy in Aug 2017, dCHF (last TTE- July 2017- mild MR, mild AS, mild-mod TR EF65%) chronic LLE pain and edema in setting of severe PVD s/p L & R fem-pop bypass  graft,  multiple vascular surgery both leg w/ fem-pop bypass revisions , Subclavian vein stenosis left s/p stent, ESRD on HD (Tu/Thr/Sat) via L AVF with oliguria, CVA with memory deficit, depression, admitted with chest pain and malaise found to have K of 7, and in DKA.    Tx to MICU for further management   CE's were elevated, started on IV heparin    no active chest pain at the moment    Patient denies any chest pain, dyspnea, palpitations, cough, syncope, abdominal pain, fever, chills,  or rash.     PAST MEDICAL & SURGICAL HISTORY:  Subclavian vein stenosis, left: s/p stent  Cellulitis: 2015 left foot  DKA, type 1: 1/2015  ACS (acute coronary syndrome): 1/2015 - cath revealed 100% ostial stenosis not amenable to PCI - medical management  MI, old  TIA (transient ischemic attack): x 2 - 8-9 years ago prior to ASD/VSD repair  CAD (coronary artery disease): s/p stents  Murmur, cardiac  Gout: past  CVA (cerebral infarction): with no residual, 8 yrs ago, prior to heart surgery - ST memory loss  Peripheral vascular disease: occluded left fem-pop bypass 5/2015  Diabetes mellitus type 1: Insulin Dependent - [employer]  Minimed Paradigm Insulin Pump - Novolog  ESRD (end stage renal disease): dialysis , tue, thursday, saturday  Hyperlipidemia  Status post device closure of ASD: &quot;clamshell&quot;  History of cardiac catheterization: 1/2015 - no intervention  S/P femoral-popliteal bypass surgery: L and R in 2013 with graft; 5/2015 CFA angioplasty left and ileofemoral endarterectomywith vein patch angioplasty of left fem-pop bypass graft  Multiple vascular surgery both leg, left fempop bypass revision 11/2015  AV (arteriovenous fistula): Left AV graft; revision with stent placement 2-3 years ago  S/P cholecystectomy        PREVIOUS DIAGNOSTIC TESTING:    [ ] Echocardiogram:  [ ]  Catheterization:  [ ] Stress Test:  	    MEDICATIONS:  MEDICATIONS  (STANDING):  aspirin enteric coated 81 milliGRAM(s) Oral daily  atorvastatin 20 milliGRAM(s) Oral at bedtime  clopidogrel Tablet 75 milliGRAM(s) Oral at bedtime  dextrose 5% + sodium chloride 0.45%. 1000 milliLiter(s) (100 mL/Hr) IV Continuous <Continuous>  DULoxetine 60 milliGRAM(s) Oral daily  heparin  Infusion.  Unit(s)/Hr (6.5 mL/Hr) IV Continuous <Continuous>  influenza   Vaccine 0.5 milliLiter(s) IntraMuscular once  insulin Infusion 3 Unit(s)/Hr (3 mL/Hr) IV Continuous <Continuous>  metoprolol     tartrate 50 milliGRAM(s) Oral two times a day  Nephro-raphael 1 Tablet(s) Oral daily  sevelamer hydrochloride 400 milliGRAM(s) Oral three times a day with meals      FAMILY HISTORY:  Family history of smoking  Family history of hypertension  Family history of cancer (Sibling)      SOCIAL HISTORY:    [x ] Non-smoker  [ ] Smoker  [ ] Alcohol    Allergies    No Known Allergies    Intolerances    	    REVIEW OF SYSTEMS:  CONSTITUTIONAL: No fever, weight loss, or fatigue  EYES: No eye pain, visual disturbances, or discharge  ENMT:  No difficulty hearing, tinnitus, vertigo; No sinus or throat pain  NECK: No pain or stiffness  RESPIRATORY: No cough, wheezing, chills or hemoptysis; No Shortness of Breath  CARDIOVASCULAR: No chest pain, palpitations, passing out, dizziness, or leg swelling  GASTROINTESTINAL: No abdominal or epigastric pain. No nausea, vomiting, or hematemesis; No diarrhea or constipation. No melena or hematochezia.  GENITOURINARY: No dysuria, frequency, hematuria, or incontinence  NEUROLOGICAL: No headaches, memory loss, loss of strength, numbness, or tremors  SKIN: No itching, burning, rashes, or lesions   	    [ ] All others negative	  [ ] Unable to obtain    PHYSICAL EXAM:  T(C): 36.8 (11-25-17 @ 08:00), Max: 36.9 (11-25-17 @ 00:45)  HR: 90 (11-25-17 @ 10:00) (90 - 116)  BP: 81/40 (11-25-17 @ 10:00) (81/40 - 177/75)  RR: 17 (11-25-17 @ 10:00) (12 - 38)  SpO2: 96% (11-25-17 @ 10:00) (94% - 100%)  Wt(kg): --  I&O's Summary    24 Nov 2017 07:01  -  25 Nov 2017 07:00  --------------------------------------------------------  IN: 789.5 mL / OUT: 0 mL / NET: 789.5 mL    25 Nov 2017 07:01  -  25 Nov 2017 10:32  --------------------------------------------------------  IN: 31.5 mL / OUT: 0 mL / NET: 31.5 mL        Appearance: Normal	  Psychiatry: A & O x 3, Mood & affect appropriate  HEENT:   Normal oral mucosa, PERRL, EOMI	  Lymphatic: No lymphadenopathy  Cardiovascular: Normal S1 S2,RRR, No JVD, No murmurs  Respiratory: Lungs clear to auscultation	  Gastrointestinal:  Soft, Non-tender, + BS	  Skin: No rashes, No ecchymoses, No cyanosis	  Neurologic: Non-focal  Extremities: Normal range of motion, b/l edema    TELEMETRY: 	    ECG:  	  RADIOLOGY:  OTHER: 	  	  LABS:	 	    CARDIAC MARKERS:                                  9.9    7.8   )-----------( 251      ( 25 Nov 2017 04:05 )             29.2     11-25    140  |  96  |  22  ----------------------------<  208<H>  4.6   |  24  |  4.28<H>    Ca    8.0<L>      25 Nov 2017 06:31  Phos  3.8     11-25  Mg     2.2     11-25    TPro  8.0  /  Alb  5.1<H>  /  TBili  0.3  /  DBili  x   /  AST  19  /  ALT  9<L>  /  AlkPhos  289<H>  11-24    PT/INR - ( 25 Nov 2017 04:05 )   PT: 11.3 sec;   INR: 1.04 ratio         PTT - ( 25 Nov 2017 04:05 )  PTT:23.8 sec  proBNP:   Lipid Profile:   HgA1c: Hemoglobin A1C, Whole Blood: 8.5 % (11-25 @ 04:12)    TSH:     ASSESSMENT/PLAN:

## 2017-11-25 NOTE — CONSULT NOTE ADULT - SUBJECTIVE AND OBJECTIVE BOX
HPI:  62 year old female with PMHx T1DM on insulin pump, HTN, HLD, former smoker, MI, CAD s/p PCI to RCA and brachytherapy in Aug 2017, dCHF (last TTE- July 2017- mild MR, mild AS, mild-mod TR EF65%) chronic LLE pain and edema in setting of severe PVD s/p L & R fem-pop bypass  graft,  multiple vascular surgery both leg w/ fem-pop bypass revisions , Subclavian vein stenosis left s/p stent, ESRD on HD (Tu/Thr/Sat) via L AVF with oliguria, CVA with memory deficit, depression, chronic pain management for LLE on oxycodone who presents with chest pain and malaise found to have K of 7, and in DKA.    The patient has multiple admissions for similar presentation. Patient has a new pump, states it was working. States she has been compliant with her meds and did following dietary restriction. She was on HD on T/Th/Sat, but for this week was changed to M/W/F, she did not go for HD today. She started to have chest pain today with some n/v. Sub sternal on and off pain. No diaphoresis. C/O some mid-epigastric abn pain. Had similar chest pain with her previous MI 5 stents in the past. Stents in aug 17th to RCA. Presented to ED found to have K of 7, peaked T waves, with elevations in AVR, house cards was consulted, nephrology called for urgent HD. GIven albuterol nebs, calcium chloride x2, insulins IVP with D50 and started on insulin gtt.     61 y/o F w/ hx of DM Type 1 x 43 years. Complicated by nephropathy with ESRD on HD, neuropathy, retinopathy, macrovascular comlications. A1c 6.8%. On medtronic insulin pump for the past 3 years with settings as listed below. Changes sites every 3 days. Pump now off. Does not have supplies with her, but her  will be bringing in supplies shortly. Uses bolus wizard. Checks glucose 5 x/day with [employer] Contour glucometer. Values mostly 180's-190's. No recent hypoglycemia. Reported some nausea, vomiting, and shortness of breath 2 days ago which has now resolved. Complains of LE pain with swelling which has been chronic. Denies polyuria, polydipsia, heat intolerance. Tries to monitor carbs.    Pump Settings:  Basal:  12am 0.25  5am 0.35    I:C 15    Sensitivity:  12AM 60  7AM 40  6PM 60    Target   12am 110-130  8am 100-120      PAST MEDICAL & SURGICAL HISTORY:  Subclavian vein stenosis, left: s/p stent  Cellulitis: 2015 left foot  DKA, type 1: 1/2015  ACS (acute coronary syndrome): 1/2015 - cath revealed 100% ostial stenosis not amenable to PCI - medical management  MI, old  TIA (transient ischemic attack): x 2 - 8-9 years ago prior to ASD/VSD repair  CAD (coronary artery disease): s/p stents  Murmur, cardiac  Gout: past  CVA (cerebral infarction): with no residual, 8 yrs ago, prior to heart surgery - ST memory loss  Peripheral vascular disease: occluded left fem-pop bypass 5/2015  Diabetes mellitus type 1: Insulin Dependent - Medtronic  Minimed Paradigm Insulin Pump - Novolog  ESRD (end stage renal disease): dialysis , tue, thursday, saturday  Hyperlipidemia  Status post device closure of ASD: &quot;clamshell&quot;  History of cardiac catheterization: 1/2015 - no intervention  S/P femoral-popliteal bypass surgery: L and R in 2013 with graft; 5/2015 CFA angioplasty left and ileofemoral endarterectomywith vein patch angioplasty of left fem-pop bypass graft  Multiple vascular surgery both leg, left fempop bypass revision 11/2015  AV (arteriovenous fistula): Left AV graft; revision with stent placement 2-3 years ago  S/P cholecystectomy      FAMILY HISTORY:  Family history of smoking  Family history of hypertension  Family history of cancer (Sibling)      Social History:    Outpatient Medications:    MEDICATIONS  (STANDING):  aspirin enteric coated 81 milliGRAM(s) Oral daily  atorvastatin 20 milliGRAM(s) Oral at bedtime  clopidogrel Tablet 75 milliGRAM(s) Oral at bedtime  dextrose 5% + sodium chloride 0.45%. 1000 milliLiter(s) (100 mL/Hr) IV Continuous <Continuous>  DULoxetine 60 milliGRAM(s) Oral daily  heparin  Infusion.  Unit(s)/Hr (6.5 mL/Hr) IV Continuous <Continuous>  influenza   Vaccine 0.5 milliLiter(s) IntraMuscular once  insulin Infusion 3 Unit(s)/Hr (3 mL/Hr) IV Continuous <Continuous>  metoprolol     tartrate 50 milliGRAM(s) Oral two times a day  Nephro-raphael 1 Tablet(s) Oral daily  sevelamer hydrochloride 400 milliGRAM(s) Oral three times a day with meals    MEDICATIONS  (PRN):  heparin  Injectable 3200 Unit(s) IV Push every 6 hours PRN For aPTT less than 40      Allergies    No Known Allergies    Intolerances      Review of Systems:  Constitutional: No fever  Eyes: No blurry vision  Neuro: No tremors  HEENT: No pain  Cardiovascular: No chest pain, palpitations  Respiratory: No SOB, no cough  GI: No nausea, vomiting, abdominal pain  : No dysuria  Skin: no rash  Psych: no depression  Endocrine: no polyuria, polydipsia  Hem/lymph: no swelling  Osteoporosis: no fractures    ALL OTHER SYSTEMS REVIEWED AND NEGATIVE    UNABLE TO OBTAIN    PHYSICAL EXAM:  VITALS: T(C): 36.8 (11-25-17 @ 08:00)  T(F): 98.3 (11-25-17 @ 08:00), Max: 98.4 (11-25-17 @ 00:45)  HR: 90 (11-25-17 @ 10:00) (90 - 116)  BP: 81/40 (11-25-17 @ 10:00) (81/40 - 177/75)  RR:  (12 - 38)  SpO2:  (94% - 100%)  Wt(kg): --  GENERAL: NAD, well-groomed, well-developed  EYES: No proptosis, no lid lag, anicteric  HEENT:  Atraumatic, Normocephalic, moist mucous membranes  THYROID: Normal size, no palpable nodules  RESPIRATORY: Clear to auscultation bilaterally; No rales, rhonchi, wheezing, or rubs  CARDIOVASCULAR: Regular rate and rhythm; No murmurs; no peripheral edema  GI: Soft, nontender, non distended, normal bowel sounds  SKIN: Dry, intact, No rashes or lesions  MUSCULOSKELETAL: Full range of motion, normal strength  NEURO: sensation intact, extraocular movements intact, no tremor, normal reflexes  PSYCH: Alert and oriented x 3, normal affect, normal mood  CUSHING'S SIGNS: no striae    POCT Blood Glucose.: 345 mg/dL (11-25-17 @ 10:20)  POCT Blood Glucose.: 275 mg/dL (11-25-17 @ 09:02)  POCT Blood Glucose.: 222 mg/dL (11-25-17 @ 08:08)  POCT Blood Glucose.: 200 mg/dL (11-25-17 @ 06:54)  POCT Blood Glucose.: 203 mg/dL (11-25-17 @ 06:08)  POCT Blood Glucose.: 179 mg/dL (11-25-17 @ 05:04)  POCT Blood Glucose.: 207 mg/dL (11-25-17 @ 04:14)  POCT Blood Glucose.: 259 mg/dL (11-25-17 @ 03:18)  POCT Blood Glucose.: 408 mg/dL (11-25-17 @ 02:21)  POCT Blood Glucose.: >600 mg/dL (11-25-17 @ 00:43)  POCT Blood Glucose.: >600 mg/dL (11-24-17 @ 23:00)  POCT Blood Glucose.: >600 mg/dL (11-24-17 @ 21:25)                            9.9    7.8   )-----------( 251      ( 25 Nov 2017 04:05 )             29.2       11-25    140  |  96  |  22  ----------------------------<  208<H>  4.6   |  24  |  4.28<H>    EGFR if : 12<L>  EGFR if non : 11<L>    Ca    8.0<L>      11-25  Mg     2.2     11-25  Phos  3.8     11-25    TPro  8.0  /  Alb  5.1<H>  /  TBili  0.3  /  DBili  x   /  AST  19  /  ALT  9<L>  /  AlkPhos  289<H>  11-24      Thyroid Function Tests:      Hemoglobin A1C, Whole Blood: 8.5 % <H> [4.0 - 5.6] (11-25-17 @ 04:12)          Radiology: HPI:  62 year old female with PMHx T1DM on insulin pump, HTN, HLD, former smoker, MI, CAD s/p PCI to RCA and brachytherapy in Aug 2017, dCHF (last TTE- July 2017- mild MR, mild AS, mild-mod TR EF65%) chronic LLE pain and edema in setting of severe PVD s/p L & R fem-pop bypass  graft,  multiple vascular surgery both leg w/ fem-pop bypass revisions , Subclavian vein stenosis left s/p stent, ESRD on HD (Tu/Thr/Sat) via L AVF with oliguria, CVA with memory deficit, depression, chronic pain management for LLE on oxycodone who presents with chest pain and malaise found to have K of 7, and in DKA.    The patient has multiple admissions for similar presentation. Patient has a new pump, states it was working. States she has been compliant with her meds and did following dietary restriction. She was on HD on T/Th/Sat, but for this week was changed to M/W/F, she did not go for HD today. She started to have chest pain today with some n/v. Sub sternal on and off pain. No diaphoresis. C/O some mid-epigastric abn pain. Had similar chest pain with her previous MI 5 stents in the past. Stents in aug 17th to RCA. Presented to ED found to have K of 7, peaked T waves, with elevations in AVR, house cards was consulted, nephrology called for urgent HD. GIven albuterol nebs, calcium chloride x2, insulins IVP with D50 and started on insulin gtt.     63 y/o F w/ hx of DM Type 1 x 43 years. Complicated by nephropathy with ESRD on HD, neuropathy, retinopathy, macrovascular comlications. A1c 6.8%. On medtronic insulin pump for the past 3 years with settings as listed below. Changes sites every 3 days. Pump now off. Does not have supplies with her, but her  will be bringing in supplies shortly. Uses bolus wizard. Checks glucose 5 x/day with [employer] Contour glucometer. Values mostly 180's-190's. No recent hypoglycemia. Reported some nausea, vomiting, and shortness of breath 2 days ago which has now resolved. Complains of LE pain with swelling which has been chronic. Denies polyuria, polydipsia, heat intolerance. Tries to monitor carbs.  Patient was recently hospitalized  in Sept 2017 for DKA which was thought to be secondary to insulin infusion set issue.   Pump Settings:  Basal: 12am-0.275  5am-0.375  ICR: 1: 15  ISF: 12am-60 7am: 40 6pm: 60  BG Target: 12am: 110-130 8am: 100-120   Active insulin: 6 hours  Reviewed settings and boluses.          PAST MEDICAL & SURGICAL HISTORY:  Subclavian vein stenosis, left: s/p stent  Cellulitis: 2015 left foot  DKA, type 1: 1/2015  ACS (acute coronary syndrome): 1/2015 - cath revealed 100% ostial stenosis not amenable to PCI - medical management  MI, old  TIA (transient ischemic attack): x 2 - 8-9 years ago prior to ASD/VSD repair  CAD (coronary artery disease): s/p stents  Murmur, cardiac  Gout: past  CVA (cerebral infarction): with no residual, 8 yrs ago, prior to heart surgery - ST memory loss  Peripheral vascular disease: occluded left fem-pop bypass 5/2015  Diabetes mellitus type 1: Insulin Dependent - Medtronic  Minimed Paradigm Insulin Pump - Novolog  ESRD (end stage renal disease): dialysis , tue, thursday, saturday  Hyperlipidemia  Status post device closure of ASD: &quot;clamshell&quot;  History of cardiac catheterization: 1/2015 - no intervention  S/P femoral-popliteal bypass surgery: L and R in 2013 with graft; 5/2015 CFA angioplasty left and ileofemoral endarterectomywith vein patch angioplasty of left fem-pop bypass graft  Multiple vascular surgery both leg, left fempop bypass revision 11/2015  AV (arteriovenous fistula): Left AV graft; revision with stent placement 2-3 years ago  S/P cholecystectomy      FAMILY HISTORY:  Family history of smoking  Family history of hypertension  Family history of cancer (Sibling)      Social History:    Outpatient Medications:    MEDICATIONS  (STANDING):  aspirin enteric coated 81 milliGRAM(s) Oral daily  atorvastatin 20 milliGRAM(s) Oral at bedtime  clopidogrel Tablet 75 milliGRAM(s) Oral at bedtime  dextrose 5% + sodium chloride 0.45%. 1000 milliLiter(s) (100 mL/Hr) IV Continuous <Continuous>  DULoxetine 60 milliGRAM(s) Oral daily  heparin  Infusion.  Unit(s)/Hr (6.5 mL/Hr) IV Continuous <Continuous>  influenza   Vaccine 0.5 milliLiter(s) IntraMuscular once  insulin Infusion 3 Unit(s)/Hr (3 mL/Hr) IV Continuous <Continuous>  metoprolol     tartrate 50 milliGRAM(s) Oral two times a day  Nephro-raphael 1 Tablet(s) Oral daily  sevelamer hydrochloride 400 milliGRAM(s) Oral three times a day with meals    MEDICATIONS  (PRN):  heparin  Injectable 3200 Unit(s) IV Push every 6 hours PRN For aPTT less than 40      Allergies    No Known Allergies    Intolerances      Review of Systems:  Constitutional: No fever  Eyes: No blurry vision  Neuro: No tremors  HEENT: No pain  Cardiovascular: No chest pain, palpitations  Respiratory: No SOB, no cough  GI: No nausea, vomiting, abdominal pain  : No dysuria  Skin: no rash  Psych: no depression  Endocrine: no polyuria, polydipsia  Hem/lymph: no swelling  Osteoporosis: no fractures    ALL OTHER SYSTEMS REVIEWED AND NEGATIVE    UNABLE TO OBTAIN    PHYSICAL EXAM:  VITALS: T(C): 36.8 (11-25-17 @ 08:00)  T(F): 98.3 (11-25-17 @ 08:00), Max: 98.4 (11-25-17 @ 00:45)  HR: 90 (11-25-17 @ 10:00) (90 - 116)  BP: 81/40 (11-25-17 @ 10:00) (81/40 - 177/75)  RR:  (12 - 38)  SpO2:  (94% - 100%)  Wt(kg): --  GENERAL: NAD, well-groomed, well-developed  EYES: No proptosis, no lid lag, anicteric  HEENT:  Atraumatic, Normocephalic, moist mucous membranes  THYROID: Normal size, no palpable nodules  RESPIRATORY: Clear to auscultation bilaterally; No rales, rhonchi, wheezing, or rubs  CARDIOVASCULAR: Regular rate and rhythm; No murmurs; no peripheral edema  GI: Soft, nontender, non distended, normal bowel sounds  SKIN: Dry, intact, No rashes or lesions  MUSCULOSKELETAL: Full range of motion, normal strength  NEURO: sensation intact, extraocular movements intact, no tremor, normal reflexes  PSYCH: Alert and oriented x 3, normal affect, normal mood  CUSHING'S SIGNS: no striae    POCT Blood Glucose.: 345 mg/dL (11-25-17 @ 10:20)  POCT Blood Glucose.: 275 mg/dL (11-25-17 @ 09:02)  POCT Blood Glucose.: 222 mg/dL (11-25-17 @ 08:08)  POCT Blood Glucose.: 200 mg/dL (11-25-17 @ 06:54)  POCT Blood Glucose.: 203 mg/dL (11-25-17 @ 06:08)  POCT Blood Glucose.: 179 mg/dL (11-25-17 @ 05:04)  POCT Blood Glucose.: 207 mg/dL (11-25-17 @ 04:14)  POCT Blood Glucose.: 259 mg/dL (11-25-17 @ 03:18)  POCT Blood Glucose.: 408 mg/dL (11-25-17 @ 02:21)  POCT Blood Glucose.: >600 mg/dL (11-25-17 @ 00:43)  POCT Blood Glucose.: >600 mg/dL (11-24-17 @ 23:00)  POCT Blood Glucose.: >600 mg/dL (11-24-17 @ 21:25)                            9.9    7.8   )-----------( 251      ( 25 Nov 2017 04:05 )             29.2       11-25    140  |  96  |  22  ----------------------------<  208<H>  4.6   |  24  |  4.28<H>    EGFR if : 12<L>  EGFR if non : 11<L>    Ca    8.0<L>      11-25  Mg     2.2     11-25  Phos  3.8     11-25    TPro  8.0  /  Alb  5.1<H>  /  TBili  0.3  /  DBili  x   /  AST  19  /  ALT  9<L>  /  AlkPhos  289<H>  11-24      Thyroid Function Tests:      Hemoglobin A1C, Whole Blood: 8.5 % <H> [4.0 - 5.6] (11-25-17 @ 04:12)          Radiology: HPI:  62 year old female with PMHx T1DM on insulin pump, HTN, HLD, former smoker, MI, CAD s/p PCI to RCA and brachytherapy in Aug 2017, dCHF (last TTE- July 2017- mild MR, mild AS, mild-mod TR EF65%) chronic LLE pain and edema in setting of severe PVD s/p L & R fem-pop bypass  graft,  multiple vascular surgery both leg w/ fem-pop bypass revisions , Subclavian vein stenosis left s/p stent, ESRD on HD (Tu/Thr/Sat) via L AVF with oliguria, CVA with memory deficit, depression, chronic pain management for LLE on oxycodone who presents with chest pain and malaise found to have K of 7, and in DKA.    The patient has multiple admissions for similar presentation. Patient has a new pump, states it was working. States she has been compliant with her meds and did following dietary restriction. She was on HD on T/Th/Sat, but for this week was changed to M/W/F, she did not go for HD today. She started to have chest pain today with some n/v. Sub sternal on and off pain. No diaphoresis. C/O some mid-epigastric abn pain. Had similar chest pain with her previous MI 5 stents in the past. Stents in aug 17th to RCA. Presented to ED found to have K of 7, peaked T waves, with elevations in AVR, house cards was consulted, nephrology called for urgent HD. GIven albuterol nebs, calcium chloride x2, insulins IVP with D50 and started on insulin gtt.     Endocrine history: 63 y/o F w/ hx of DM Type 1 x 43 years. Her DM is complicated by nephropathy with ESRD on HD, neuropathy, retinopathy. She has macrovascular complications of CAD and PVD.  A1c found to be 8.5%. Patient follows with Dr Av brand and has been on medtronic insulin pump with humalog for the past 3 years.  Changes sites every 3 days. Uses bolus wizard. Checks glucose 4 x/day with [employer] Contour glucometer. Values are 180's-190's in -250 prelunch and predinner and 250s at night. No recent hypoglycemia. Patient was recently hospitalized  in Sept 2017 for DKA which was thought to be secondary to insulin infusion set issue. She was last discharged on the following settings and there have been no changes.    Pump Settings:  Basal: 12am-0.275  5am-0.375  ICR: 1: 15  ISF: 12am-60 7am: 40 6pm: 60  BG Target: 12am: 110-130 8am: 100-120   Active insulin: 6 hours  Patient tries to moderate her intake of carbs and does not drink soda or fruit juice.   Patient reports that for the past 3 days her glucose values have been running in 500s. She has been having worsening chest pain and poor appetite. Denies any problem with functioning of insulin pump. Found to have hyperkalemia and elevated troponins on admission. Patient was started on insulin gtt with decrease in AG. However this AM patient's insulin gtt was stopped at a glucose of 179 and patient was started on a D20 infusion. her gap has now reopened and glucose values are in 300s.              PAST MEDICAL & SURGICAL HISTORY:  Subclavian vein stenosis, left: s/p stent  Cellulitis: 2015 left foot  DKA, type 1: 1/2015  ACS (acute coronary syndrome): 1/2015 - cath revealed 100% ostial stenosis not amenable to PCI - medical management  MI, old  TIA (transient ischemic attack): x 2 - 8-9 years ago prior to ASD/VSD repair  CAD (coronary artery disease): s/p stents  Murmur, cardiac  Gout: past  CVA (cerebral infarction): with no residual, 8 yrs ago, prior to heart surgery - ST memory loss  Peripheral vascular disease: occluded left fem-pop bypass 5/2015  Diabetes mellitus type 1: Insulin Dependent - Medtronic  Minimed Paradigm Insulin Pump - Novolog  ESRD (end stage renal disease): dialysis , tue, thursday, saturday  Hyperlipidemia  Status post device closure of ASD: &quot;clamshell&quot;  History of cardiac catheterization: 1/2015 - no intervention  S/P femoral-popliteal bypass surgery: L and R in 2013 with graft; 5/2015 CFA angioplasty left and ileofemoral endarterectomywith vein patch angioplasty of left fem-pop bypass graft  Multiple vascular surgery both leg, left fempop bypass revision 11/2015  AV (arteriovenous fistula): Left AV graft; revision with stent placement 2-3 years ago  S/P cholecystectomy      FAMILY HISTORY:  Family history of smoking  Family history of hypertension  Family history of cancer (Sibling)      Social History: Ex smoker, no ETOH abuse    Outpatient Medications:  · 	sevelamer hydrochloride 800 mg oral tablet: 3 tab(s) orally 3 times a day (with meals)  · 	metoprolol tartrate 50 mg oral tablet: 1 tab(s) orally 2 times a day  · 	insulin lispro 100 units/mL subcutaneous solution: 1 each subcutaneous   · 	lisinopril 40 mg oral tablet: 1 tab(s) orally once a day (at bedtime)  · 	DULoxetine 60 mg oral delayed release capsule: 1 cap(s) orally once a day  · 	atorvastatin 20 mg oral tablet: 1 tab(s) orally once a day (at bedtime)  · 	aspirin 81 mg oral delayed release tablet: 1 tab(s) orally once a day  · 	clopidogrel 75 mg oral tablet: 1 tab(s) orally once a day (at bedtime)  · 	hydrALAZINE 100 mg oral tablet: 1 tab(s) orally 3 times a day  · 	Multiple Vitamins oral tablet: 1 tab(s) orally once a day  · 	Percocet 5/325 oral tablet: 1 tab(s) orally once a day (at bedtime)      MEDICATIONS  (STANDING):  aspirin enteric coated 81 milliGRAM(s) Oral daily  atorvastatin 20 milliGRAM(s) Oral at bedtime  clopidogrel Tablet 75 milliGRAM(s) Oral at bedtime  dextrose 5% + sodium chloride 0.45%. 1000 milliLiter(s) (100 mL/Hr) IV Continuous <Continuous>  DULoxetine 60 milliGRAM(s) Oral daily  heparin  Infusion.  Unit(s)/Hr (6.5 mL/Hr) IV Continuous <Continuous>  influenza   Vaccine 0.5 milliLiter(s) IntraMuscular once  insulin Infusion 3 Unit(s)/Hr (3 mL/Hr) IV Continuous <Continuous>  metoprolol     tartrate 50 milliGRAM(s) Oral two times a day  Nephro-raphael 1 Tablet(s) Oral daily  sevelamer hydrochloride 400 milliGRAM(s) Oral three times a day with meals    MEDICATIONS  (PRN):  heparin  Injectable 3200 Unit(s) IV Push every 6 hours PRN For aPTT less than 40      Allergies    No Known Allergies    Intolerances      Review of Systems:  Constitutional: No fever  Eyes: No blurry vision  Neuro: No tremors  HEENT: No pain  Cardiovascular: + chest pain, no palpitations  Respiratory: No SOB, no cough  GI: No nausea, vomiting, abdominal pain  : No dysuria  Skin: no rash  Psych: no depression  Endocrine: + polyuria, polydipsia      ALL OTHER SYSTEMS REVIEWED AND NEGATIVE        PHYSICAL EXAM:  VITALS: T(C): 36.8 (11-25-17 @ 08:00)  T(F): 98.3 (11-25-17 @ 08:00), Max: 98.4 (11-25-17 @ 00:45)  HR: 90 (11-25-17 @ 10:00) (90 - 116)  BP: 81/40 (11-25-17 @ 10:00) (81/40 - 177/75)  RR:  (12 - 38)  SpO2:  (94% - 100%)  Wt(kg): --  GENERAL: NAD, well-groomed, well-developed  EYES: No proptosis, no lid lag, anicteric  HEENT:  Atraumatic, Normocephalic, moist mucous membranes  THYROID: Normal size, no palpable nodules  RESPIRATORY: Clear to auscultation bilaterally; No rales, rhonchi, wheezing, or rubs  CARDIOVASCULAR: Regular rate and rhythm; No murmurs; no peripheral edema  GI: Soft, nontender, non distended, normal bowel sounds  SKIN: Dry, intact, No rashes or lesions  PSYCH: Alert and oriented x 3, normal affect, normal mood      POCT Blood Glucose.: 345 mg/dL (11-25-17 @ 10:20)  POCT Blood Glucose.: 275 mg/dL (11-25-17 @ 09:02)  POCT Blood Glucose.: 222 mg/dL (11-25-17 @ 08:08)  POCT Blood Glucose.: 200 mg/dL (11-25-17 @ 06:54)  POCT Blood Glucose.: 203 mg/dL (11-25-17 @ 06:08)  POCT Blood Glucose.: 179 mg/dL (11-25-17 @ 05:04)  POCT Blood Glucose.: 207 mg/dL (11-25-17 @ 04:14)  POCT Blood Glucose.: 259 mg/dL (11-25-17 @ 03:18)  POCT Blood Glucose.: 408 mg/dL (11-25-17 @ 02:21)  POCT Blood Glucose.: >600 mg/dL (11-25-17 @ 00:43)  POCT Blood Glucose.: >600 mg/dL (11-24-17 @ 23:00)  POCT Blood Glucose.: >600 mg/dL (11-24-17 @ 21:25)                            9.9    7.8   )-----------( 251      ( 25 Nov 2017 04:05 )             29.2       11-25    140  |  96  |  22  ----------------------------<  208<H>  4.6   |  24  |  4.28<H>    EGFR if : 12<L>  EGFR if non : 11<L>    Ca    8.0<L>      11-25  Mg     2.2     11-25  Phos  3.8     11-25    TPro  8.0  /  Alb  5.1<H>  /  TBili  0.3  /  DBili  x   /  AST  19  /  ALT  9<L>  /  AlkPhos  289<H>  11-24          Hemoglobin A1C, Whole Blood: 8.5 % <H> [4.0 - 5.6] (11-25-17 @ 04:12) HPI:  62 year old female with PMHx T1DM on insulin pump, HTN, HLD, former smoker, MI, CAD s/p PCI to RCA and brachytherapy in Aug 2017, dCHF (last TTE- July 2017- mild MR, mild AS, mild-mod TR EF65%) chronic LLE pain and edema in setting of severe PVD s/p L & R fem-pop bypass  graft,  multiple vascular surgery both leg w/ fem-pop bypass revisions , Subclavian vein stenosis left s/p stent, ESRD on HD (Tu/Thr/Sat) via L AVF with oliguria, CVA with memory deficit, depression, chronic pain management for LLE on oxycodone who presents with chest pain and malaise found to have K of 7, and in DKA.    The patient has multiple admissions for similar presentation. Patient has a new pump, states it was working. States she has been compliant with her meds and did following dietary restriction. She was on HD on T/Th/Sat, but for this week was changed to M/W/F, she did not go for HD today. She started to have chest pain today with some n/v. Sub sternal on and off pain. No diaphoresis. C/O some mid-epigastric abn pain. Had similar chest pain with her previous MI 5 stents in the past. Stents in aug 17th to RCA. Presented to ED found to have K of 7, peaked T waves, with elevations in AVR, house cards was consulted, nephrology called for urgent HD. GIven albuterol nebs, calcium chloride x2, insulins IVP with D50 and started on insulin gtt.     Endocrine history: 63 y/o F w/ hx of DM Type 1 x 43 years. Her DM is complicated by nephropathy with ESRD on HD, neuropathy, retinopathy. She has macrovascular complications of CAD and PVD.  A1c found to be 8.5%. Patient follows with Dr Av brand and has been on medtronic insulin pump with humalog for the past 3 years.  Changes sites every 3 days. Uses bolus wizard. Checks glucose 4 x/day with Junior Contour glucometer. Values are 180's-190's in -250 prelunch and predinner and 250s at night. No recent hypoglycemia. Patient was recently hospitalized  in Sept 2017 for DKA which was thought to be secondary to insulin infusion set issue. She was last discharged on the following settings and there have been no changes.    Pump Settings:  Basal: 12am-0.275  5am-0.375  ICR: 1: 15  ISF: 12am-60 7am: 40 6pm: 60  BG Target: 12am: 110-130 8am: 100-120   Active insulin: 6 hours  Patient tries to moderate her intake of carbs and does not drink soda or fruit juice.   Patient reports that for the past 3 days her glucose values have been running in 500s. She has been having worsening chest pain and poor appetite. Denies any problem with functioning of insulin pump. Found to have hyperkalemia and elevated troponins on admission. Patient was started on insulin gtt with decrease in AG. However this AM patient's insulin gtt was stopped at a glucose of 179 and patient was started on a D20 infusion. her gap has now reopened and glucose values are in 300s.        PAST MEDICAL & SURGICAL HISTORY:  Subclavian vein stenosis, left: s/p stent  Cellulitis: 2015 left foot  DKA, type 1: 1/2015  ACS (acute coronary syndrome): 1/2015 - cath revealed 100% ostial stenosis not amenable to PCI - medical management  MI, old  TIA (transient ischemic attack): x 2 - 8-9 years ago prior to ASD/VSD repair  CAD (coronary artery disease): s/p stents  Murmur, cardiac  Gout: past  CVA (cerebral infarction): with no residual, 8 yrs ago, prior to heart surgery - ST memory loss  Peripheral vascular disease: occluded left fem-pop bypass 5/2015  Diabetes mellitus type 1: Insulin Dependent - [employer]tr[employer]  Minimed Paradigm Insulin Pump - Novolog  ESRD (end stage renal disease): dialysis , tue, thursday, saturday  Hyperlipidemia  Status post device closure of ASD: &quot;clamshell&quot;  History of cardiac catheterization: 1/2015 - no intervention  S/P femoral-popliteal bypass surgery: L and R in 2013 with graft; 5/2015 CFA angioplasty left and ileofemoral endarterectomywith vein patch angioplasty of left fem-pop bypass graft  Multiple vascular surgery both leg, left fempop bypass revision 11/2015  AV (arteriovenous fistula): Left AV graft; revision with stent placement 2-3 years ago  S/P cholecystectomy      FAMILY HISTORY:  Family history of smoking  Family history of hypertension  Family history of cancer (Sibling)      Social History: Ex smoker, no ETOH abuse    Outpatient Medications:  · 	sevelamer hydrochloride 800 mg oral tablet: 3 tab(s) orally 3 times a day (with meals)  · 	metoprolol tartrate 50 mg oral tablet: 1 tab(s) orally 2 times a day  · 	insulin lispro 100 units/mL subcutaneous solution: 1 each subcutaneous   · 	lisinopril 40 mg oral tablet: 1 tab(s) orally once a day (at bedtime)  · 	DULoxetine 60 mg oral delayed release capsule: 1 cap(s) orally once a day  · 	atorvastatin 20 mg oral tablet: 1 tab(s) orally once a day (at bedtime)  · 	aspirin 81 mg oral delayed release tablet: 1 tab(s) orally once a day  · 	clopidogrel 75 mg oral tablet: 1 tab(s) orally once a day (at bedtime)  · 	hydrALAZINE 100 mg oral tablet: 1 tab(s) orally 3 times a day  · 	Multiple Vitamins oral tablet: 1 tab(s) orally once a day  · 	Percocet 5/325 oral tablet: 1 tab(s) orally once a day (at bedtime)      MEDICATIONS  (STANDING):  aspirin enteric coated 81 milliGRAM(s) Oral daily  atorvastatin 20 milliGRAM(s) Oral at bedtime  clopidogrel Tablet 75 milliGRAM(s) Oral at bedtime  dextrose 5% + sodium chloride 0.45%. 1000 milliLiter(s) (100 mL/Hr) IV Continuous <Continuous>  DULoxetine 60 milliGRAM(s) Oral daily  heparin  Infusion.  Unit(s)/Hr (6.5 mL/Hr) IV Continuous <Continuous>  influenza   Vaccine 0.5 milliLiter(s) IntraMuscular once  insulin Infusion 3 Unit(s)/Hr (3 mL/Hr) IV Continuous <Continuous>  metoprolol     tartrate 50 milliGRAM(s) Oral two times a day  Nephro-raphael 1 Tablet(s) Oral daily  sevelamer hydrochloride 400 milliGRAM(s) Oral three times a day with meals    MEDICATIONS  (PRN):  heparin  Injectable 3200 Unit(s) IV Push every 6 hours PRN For aPTT less than 40      Allergies  No Known Allergies    Review of Systems:  Constitutional: No fever  Eyes: No blurry vision  Neuro: No tremors  HEENT: No pain  Cardiovascular: + chest pain, no palpitations  Respiratory: No SOB, no cough  GI: No nausea, vomiting, abdominal pain  : No dysuria  Skin: no rash  Psych: no depression  Endocrine: + polyuria, polydipsia  ALL OTHER SYSTEMS REVIEWED AND NEGATIVE    PHYSICAL EXAM:  VITALS: T(C): 36.8 (11-25-17 @ 08:00)  T(F): 98.3 (11-25-17 @ 08:00), Max: 98.4 (11-25-17 @ 00:45)  HR: 90 (11-25-17 @ 10:00) (90 - 116)  BP: 81/40 (11-25-17 @ 10:00) (81/40 - 177/75)  RR:  (12 - 38)  SpO2:  (94% - 100%)  Wt(kg): --  GENERAL: NAD, well-groomed, well-developed  EYES: No proptosis, no lid lag, anicteric  HEENT:  Atraumatic, Normocephalic, moist mucous membranes  THYROID: Normal size, no palpable nodules  RESPIRATORY: Clear to auscultation bilaterally; No rales, rhonchi, wheezing, or rubs  CARDIOVASCULAR: Regular rate and rhythm; No murmurs; no peripheral edema  GI: Soft, nontender, non distended, normal bowel sounds  SKIN: Dry, intact, No rashes or lesions  PSYCH: Alert and oriented x 3, normal affect, normal mood    POCT Blood Glucose.: 345 mg/dL (11-25-17 @ 10:20)  POCT Blood Glucose.: 275 mg/dL (11-25-17 @ 09:02)  POCT Blood Glucose.: 222 mg/dL (11-25-17 @ 08:08)  POCT Blood Glucose.: 200 mg/dL (11-25-17 @ 06:54)  POCT Blood Glucose.: 203 mg/dL (11-25-17 @ 06:08)  POCT Blood Glucose.: 179 mg/dL (11-25-17 @ 05:04)  POCT Blood Glucose.: 207 mg/dL (11-25-17 @ 04:14)  POCT Blood Glucose.: 259 mg/dL (11-25-17 @ 03:18)  POCT Blood Glucose.: 408 mg/dL (11-25-17 @ 02:21)  POCT Blood Glucose.: >600 mg/dL (11-25-17 @ 00:43)  POCT Blood Glucose.: >600 mg/dL (11-24-17 @ 23:00)  POCT Blood Glucose.: >600 mg/dL (11-24-17 @ 21:25)               9.9    7.8   )-----------( 251      ( 25 Nov 2017 04:05 )             29.2       11-25    140  |  96  |  22  ----------------------------<  208<H>  4.6   |  24  |  4.28<H>    EGFR if : 12<L>  EGFR if non : 11<L>    Ca    8.0<L>      11-25  Mg     2.2     11-25  Phos  3.8     11-25    TPro  8.0  /  Alb  5.1<H>  /  TBili  0.3  /  DBili  x   /  AST  19  /  ALT  9<L>  /  AlkPhos  289<H>  11-24  Hemoglobin A1C, Whole Blood: 8.5 % <H> [4.0 - 5.6] (11-25-17 @ 04:12)

## 2017-11-25 NOTE — CONSULT NOTE ADULT - ATTENDING COMMENTS
Agree with Dr. Partida assessment and plan above. Patient resting in the MICU, currently without symptoms at the time of exam. Maintain on insulin gtt until resolution of DKA per protocol. Patient has very brittle diabetes with both hypo and hyper-glycemic excursions.  If she is not monitored closely, her gap often re-opens as well.  Continue insulin gtt for now, then transition to basal/bolus as above.  Does not have pump supplies with her at this time.  Goal -180

## 2017-11-25 NOTE — PROGRESS NOTE ADULT - SUBJECTIVE AND OBJECTIVE BOX
CHIEF COMPLAINT: CP    Interval Events: No acute events since admission. No CP or SOB in the am.    REVIEW OF SYSTEMS:    CONSTITUTIONAL: Generalized fatigue  EYES/ENT: No visual changes;  No vertigo or throat pain   NECK: No pain or stiffness  RESPIRATORY: No cough, wheezing, hemoptysis; No shortness of breath  CARDIOVASCULAR: No chest pain or palpitations at the time of exam.  GASTROINTESTINAL: No abdominal or epigastric pain. No nausea, vomiting, or hematemesis; No diarrhea or constipation. No melena or hematochezia.  GENITOURINARY: No dysuria, frequency or hematuria  NEUROLOGICAL: No numbness or weakness  SKIN: No itching, burning, rashes, or lesions     T(C): 36.7 (11-25-17 @ 05:55), Max: 36.9 (11-25-17 @ 00:45)  HR: 94 (11-25-17 @ 08:00) (90 - 116)  BP: 86/46 (11-25-17 @ 08:00) (86/42 - 177/75)  RR: 26 (11-25-17 @ 08:00) (12 - 38)  SpO2: 96% (11-25-17 @ 08:00) (95% - 100%)  Wt(kg): --  GENERAL: NAD, well-developed  HEAD:  Atraumatic, Normocephalic  EYES: EOMI, PERRLA, conjunctiva and sclera clear  NECK: Supple, No JVD  CHEST/LUNG: Clear to auscultation bilaterally; No wheeze  HEART: Regular rate and rhythm; No murmurs, rubs, or gallops  ABDOMEN: Soft, Nontender, Nondistended; Bowel sounds present  EXTREMITIES:  2+ Peripheral Pulses, No clubbing, cyanosis, or edema  PSYCH: AAOx3  NEUROLOGY: non-focal  SKIN: No rashes or lesions    HOSPITAL MEDICATIONS:  aspirin enteric coated 81 milliGRAM(s) Oral daily  clopidogrel Tablet 75 milliGRAM(s) Oral at bedtime  heparin  Infusion.  Unit(s)/Hr IV Continuous <Continuous>      metoprolol     tartrate 50 milliGRAM(s) Oral two times a day    atorvastatin 20 milliGRAM(s) Oral at bedtime  insulin Infusion 0.5 Unit(s)/Hr IV Continuous <Continuous>      DULoxetine 60 milliGRAM(s) Oral daily  fentaNYL    Injectable 25 MICROGram(s) IV Push once          dextrose 5% + sodium chloride 0.45%. 500 milliLiter(s) IV Continuous <Continuous>  dextrose 5% + sodium chloride 0.45%. 1000 milliLiter(s) IV Continuous <Continuous>  Nephro-raphael 1 Tablet(s) Oral daily    influenza   Vaccine 0.5 milliLiter(s) IntraMuscular once      sevelamer hydrochloride 400 milliGRAM(s) Oral three times a day with meals      LABS:                        9.9    7.8   )-----------( 251      ( 25 Nov 2017 04:05 )             29.2     Hgb Trend: 9.9<--, 12.5<--  11-25    140  |  96  |  22  ----------------------------<  208<H>  4.6   |  24  |  4.28<H>    Ca    8.0<L>      25 Nov 2017 06:31  Phos  3.8     11-25  Mg     2.2     11-25    TPro  8.0  /  Alb  5.1<H>  /  TBili  0.3  /  DBili  x   /  AST  19  /  ALT  9<L>  /  AlkPhos  289<H>  11-24    Creatinine Trend: 4.28<--, 9.51<--, 9.13<--  PT/INR - ( 25 Nov 2017 04:05 )   PT: 11.3 sec;   INR: 1.04 ratio         PTT - ( 25 Nov 2017 04:05 )  PTT:23.8 sec      Venous Blood Gas:  11-25 @ 01:38  7.30/26/100/12/96  VBG Lactate: 7.8  Venous Blood Gas:  11-24 @ 21:56  7.25/32/46/14/69  VBG Lactate: 4.2

## 2017-11-25 NOTE — CHART NOTE - NSCHARTNOTEFT_GEN_A_CORE
Cardiology Fellow    Called by ED to evaluate for possible STEMI at ~ 11:20 pm (11/24/2017). Pt is a 63 y/o woman with h/o ESRD on HD, CAD, PAD who presented with N/V and associated chest pressure, which had been present for about five hours prior to arrival. Briefly reviewed patient's chart and noted that patient follows with Dr. eVla, who has cath lab privileges here. Advised ED to contact Dr. Vela. Also discussed case with interventionalist on call (Dr. Meléndez) in the event that Dr. Vela could not be reached and urgent cath was needed. After reviewing history and labs, there did not appear to be a clear urgent indication for cardiac catheterization, but advised ED to contact Dr. Vela and treat for possible ACS in the meantime, in addition to treating with urgent HD. Also briefly discussed with MICU.

## 2017-11-26 LAB
ACETONE SERPL-MCNC: NEGATIVE — SIGNIFICANT CHANGE UP
ALBUMIN SERPL ELPH-MCNC: 3.4 G/DL — SIGNIFICANT CHANGE UP (ref 3.3–5)
ALP SERPL-CCNC: 205 U/L — HIGH (ref 40–120)
ALT FLD-CCNC: 23 U/L RC — SIGNIFICANT CHANGE UP (ref 10–45)
ANION GAP SERPL CALC-SCNC: 15 MMOL/L — SIGNIFICANT CHANGE UP (ref 5–17)
ANION GAP SERPL CALC-SCNC: 15 MMOL/L — SIGNIFICANT CHANGE UP (ref 5–17)
ANION GAP SERPL CALC-SCNC: 16 MMOL/L — SIGNIFICANT CHANGE UP (ref 5–17)
ANION GAP SERPL CALC-SCNC: 17 MMOL/L — SIGNIFICANT CHANGE UP (ref 5–17)
APTT BLD: 27.1 SEC — LOW (ref 27.5–37.4)
APTT BLD: 35.1 SEC — SIGNIFICANT CHANGE UP (ref 27.5–37.4)
APTT BLD: 42.8 SEC — HIGH (ref 27.5–37.4)
AST SERPL-CCNC: 88 U/L — HIGH (ref 10–40)
BASOPHILS # BLD AUTO: 0 K/UL — SIGNIFICANT CHANGE UP (ref 0–0.2)
BASOPHILS NFR BLD AUTO: 0.4 % — SIGNIFICANT CHANGE UP (ref 0–2)
BILIRUB SERPL-MCNC: 0.3 MG/DL — SIGNIFICANT CHANGE UP (ref 0.2–1.2)
BUN SERPL-MCNC: 24 MG/DL — HIGH (ref 7–23)
BUN SERPL-MCNC: 25 MG/DL — HIGH (ref 7–23)
BUN SERPL-MCNC: 26 MG/DL — HIGH (ref 7–23)
BUN SERPL-MCNC: 27 MG/DL — HIGH (ref 7–23)
CALCIUM SERPL-MCNC: 8.4 MG/DL — SIGNIFICANT CHANGE UP (ref 8.4–10.5)
CALCIUM SERPL-MCNC: 8.5 MG/DL — SIGNIFICANT CHANGE UP (ref 8.4–10.5)
CALCIUM SERPL-MCNC: 8.7 MG/DL — SIGNIFICANT CHANGE UP (ref 8.4–10.5)
CALCIUM SERPL-MCNC: 8.7 MG/DL — SIGNIFICANT CHANGE UP (ref 8.4–10.5)
CHLORIDE SERPL-SCNC: 90 MMOL/L — LOW (ref 96–108)
CHLORIDE SERPL-SCNC: 92 MMOL/L — LOW (ref 96–108)
CHLORIDE SERPL-SCNC: 93 MMOL/L — LOW (ref 96–108)
CHLORIDE SERPL-SCNC: 94 MMOL/L — LOW (ref 96–108)
CK MB BLD-MCNC: 7.7 % — HIGH (ref 0–3.5)
CK MB CFR SERPL CALC: 19.5 NG/ML — HIGH (ref 0–3.8)
CK SERPL-CCNC: 254 U/L — HIGH (ref 25–170)
CO2 SERPL-SCNC: 25 MMOL/L — SIGNIFICANT CHANGE UP (ref 22–31)
CREAT SERPL-MCNC: 5.55 MG/DL — HIGH (ref 0.5–1.3)
CREAT SERPL-MCNC: 5.98 MG/DL — HIGH (ref 0.5–1.3)
CREAT SERPL-MCNC: 6.06 MG/DL — HIGH (ref 0.5–1.3)
CREAT SERPL-MCNC: 6.27 MG/DL — HIGH (ref 0.5–1.3)
EOSINOPHIL # BLD AUTO: 0.1 K/UL — SIGNIFICANT CHANGE UP (ref 0–0.5)
EOSINOPHIL NFR BLD AUTO: 1.1 % — SIGNIFICANT CHANGE UP (ref 0–6)
GLUCOSE BLDC GLUCOMTR-MCNC: 155 MG/DL — HIGH (ref 70–99)
GLUCOSE BLDC GLUCOMTR-MCNC: 158 MG/DL — HIGH (ref 70–99)
GLUCOSE BLDC GLUCOMTR-MCNC: 165 MG/DL — HIGH (ref 70–99)
GLUCOSE BLDC GLUCOMTR-MCNC: 167 MG/DL — HIGH (ref 70–99)
GLUCOSE BLDC GLUCOMTR-MCNC: 168 MG/DL — HIGH (ref 70–99)
GLUCOSE BLDC GLUCOMTR-MCNC: 174 MG/DL — HIGH (ref 70–99)
GLUCOSE BLDC GLUCOMTR-MCNC: 200 MG/DL — HIGH (ref 70–99)
GLUCOSE BLDC GLUCOMTR-MCNC: 208 MG/DL — HIGH (ref 70–99)
GLUCOSE BLDC GLUCOMTR-MCNC: 253 MG/DL — HIGH (ref 70–99)
GLUCOSE BLDC GLUCOMTR-MCNC: 256 MG/DL — HIGH (ref 70–99)
GLUCOSE BLDC GLUCOMTR-MCNC: 272 MG/DL — HIGH (ref 70–99)
GLUCOSE BLDC GLUCOMTR-MCNC: 312 MG/DL — HIGH (ref 70–99)
GLUCOSE BLDC GLUCOMTR-MCNC: 372 MG/DL — HIGH (ref 70–99)
GLUCOSE SERPL-MCNC: 138 MG/DL — HIGH (ref 70–99)
GLUCOSE SERPL-MCNC: 180 MG/DL — HIGH (ref 70–99)
GLUCOSE SERPL-MCNC: 278 MG/DL — HIGH (ref 70–99)
GLUCOSE SERPL-MCNC: 330 MG/DL — HIGH (ref 70–99)
HCT VFR BLD CALC: 31.3 % — LOW (ref 34.5–45)
HCT VFR BLD CALC: 32.2 % — LOW (ref 34.5–45)
HGB BLD-MCNC: 10.6 G/DL — LOW (ref 11.5–15.5)
HGB BLD-MCNC: 10.7 G/DL — LOW (ref 11.5–15.5)
INR BLD: 0.99 RATIO — SIGNIFICANT CHANGE UP (ref 0.88–1.16)
LYMPHOCYTES # BLD AUTO: 0.9 K/UL — LOW (ref 1–3.3)
LYMPHOCYTES # BLD AUTO: 19.1 % — SIGNIFICANT CHANGE UP (ref 13–44)
MAGNESIUM SERPL-MCNC: 2.2 MG/DL — SIGNIFICANT CHANGE UP (ref 1.6–2.6)
MAGNESIUM SERPL-MCNC: 2.2 MG/DL — SIGNIFICANT CHANGE UP (ref 1.6–2.6)
MAGNESIUM SERPL-MCNC: 2.3 MG/DL — SIGNIFICANT CHANGE UP (ref 1.6–2.6)
MAGNESIUM SERPL-MCNC: 2.6 MG/DL — SIGNIFICANT CHANGE UP (ref 1.6–2.6)
MCHC RBC-ENTMCNC: 33.3 GM/DL — SIGNIFICANT CHANGE UP (ref 32–36)
MCHC RBC-ENTMCNC: 33.8 GM/DL — SIGNIFICANT CHANGE UP (ref 32–36)
MCHC RBC-ENTMCNC: 35.4 PG — HIGH (ref 27–34)
MCHC RBC-ENTMCNC: 35.9 PG — HIGH (ref 27–34)
MCV RBC AUTO: 106 FL — HIGH (ref 80–100)
MCV RBC AUTO: 106 FL — HIGH (ref 80–100)
MONOCYTES # BLD AUTO: 0.4 K/UL — SIGNIFICANT CHANGE UP (ref 0–0.9)
MONOCYTES NFR BLD AUTO: 8.7 % — SIGNIFICANT CHANGE UP (ref 2–14)
NEUTROPHILS # BLD AUTO: 3.4 K/UL — SIGNIFICANT CHANGE UP (ref 1.8–7.4)
NEUTROPHILS NFR BLD AUTO: 70.7 % — SIGNIFICANT CHANGE UP (ref 43–77)
PHOSPHATE SERPL-MCNC: 4.1 MG/DL — SIGNIFICANT CHANGE UP (ref 2.5–4.5)
PHOSPHATE SERPL-MCNC: 4.4 MG/DL — SIGNIFICANT CHANGE UP (ref 2.5–4.5)
PHOSPHATE SERPL-MCNC: 4.4 MG/DL — SIGNIFICANT CHANGE UP (ref 2.5–4.5)
PHOSPHATE SERPL-MCNC: 4.8 MG/DL — HIGH (ref 2.5–4.5)
PLATELET # BLD AUTO: 184 K/UL — SIGNIFICANT CHANGE UP (ref 150–400)
PLATELET # BLD AUTO: 194 K/UL — SIGNIFICANT CHANGE UP (ref 150–400)
POTASSIUM SERPL-MCNC: 4.5 MMOL/L — SIGNIFICANT CHANGE UP (ref 3.5–5.3)
POTASSIUM SERPL-MCNC: 4.8 MMOL/L — SIGNIFICANT CHANGE UP (ref 3.5–5.3)
POTASSIUM SERPL-MCNC: 5 MMOL/L — SIGNIFICANT CHANGE UP (ref 3.5–5.3)
POTASSIUM SERPL-MCNC: 5.7 MMOL/L — HIGH (ref 3.5–5.3)
POTASSIUM SERPL-SCNC: 4.5 MMOL/L — SIGNIFICANT CHANGE UP (ref 3.5–5.3)
POTASSIUM SERPL-SCNC: 4.8 MMOL/L — SIGNIFICANT CHANGE UP (ref 3.5–5.3)
POTASSIUM SERPL-SCNC: 5 MMOL/L — SIGNIFICANT CHANGE UP (ref 3.5–5.3)
POTASSIUM SERPL-SCNC: 5.7 MMOL/L — HIGH (ref 3.5–5.3)
PROT SERPL-MCNC: 5.8 G/DL — LOW (ref 6–8.3)
PROTHROM AB SERPL-ACNC: 10.7 SEC — SIGNIFICANT CHANGE UP (ref 9.8–12.7)
RBC # BLD: 2.94 M/UL — LOW (ref 3.8–5.2)
RBC # BLD: 3.03 M/UL — LOW (ref 3.8–5.2)
RBC # FLD: 12.4 % — SIGNIFICANT CHANGE UP (ref 10.3–14.5)
RBC # FLD: 12.6 % — SIGNIFICANT CHANGE UP (ref 10.3–14.5)
SODIUM SERPL-SCNC: 130 MMOL/L — LOW (ref 135–145)
SODIUM SERPL-SCNC: 133 MMOL/L — LOW (ref 135–145)
SODIUM SERPL-SCNC: 133 MMOL/L — LOW (ref 135–145)
SODIUM SERPL-SCNC: 136 MMOL/L — SIGNIFICANT CHANGE UP (ref 135–145)
TROPONIN T SERPL-MCNC: 3.98 NG/ML — HIGH (ref 0–0.06)
WBC # BLD: 3.5 K/UL — LOW (ref 3.8–10.5)
WBC # BLD: 4.8 K/UL — SIGNIFICANT CHANGE UP (ref 3.8–10.5)
WBC # FLD AUTO: 3.5 K/UL — LOW (ref 3.8–10.5)
WBC # FLD AUTO: 4.8 K/UL — SIGNIFICANT CHANGE UP (ref 3.8–10.5)

## 2017-11-26 RX ORDER — SEVELAMER CARBONATE 2400 MG/1
800 POWDER, FOR SUSPENSION ORAL
Qty: 0 | Refills: 0 | Status: DISCONTINUED | OUTPATIENT
Start: 2017-11-26 | End: 2017-11-29

## 2017-11-26 RX ORDER — DEXTROSE 50 % IN WATER 50 %
1 SYRINGE (ML) INTRAVENOUS ONCE
Qty: 0 | Refills: 0 | Status: DISCONTINUED | OUTPATIENT
Start: 2017-11-26 | End: 2017-11-29

## 2017-11-26 RX ORDER — INSULIN LISPRO 100/ML
VIAL (ML) SUBCUTANEOUS AT BEDTIME
Qty: 0 | Refills: 0 | Status: DISCONTINUED | OUTPATIENT
Start: 2017-11-26 | End: 2017-11-28

## 2017-11-26 RX ORDER — INSULIN LISPRO 100/ML
VIAL (ML) SUBCUTANEOUS
Qty: 0 | Refills: 0 | Status: DISCONTINUED | OUTPATIENT
Start: 2017-11-26 | End: 2017-11-27

## 2017-11-26 RX ORDER — SODIUM CHLORIDE 9 MG/ML
1000 INJECTION, SOLUTION INTRAVENOUS
Qty: 0 | Refills: 0 | Status: DISCONTINUED | OUTPATIENT
Start: 2017-11-26 | End: 2017-11-29

## 2017-11-26 RX ORDER — DEXTROSE 50 % IN WATER 50 %
25 SYRINGE (ML) INTRAVENOUS ONCE
Qty: 0 | Refills: 0 | Status: DISCONTINUED | OUTPATIENT
Start: 2017-11-26 | End: 2017-11-29

## 2017-11-26 RX ORDER — GLUCAGON INJECTION, SOLUTION 0.5 MG/.1ML
1 INJECTION, SOLUTION SUBCUTANEOUS ONCE
Qty: 0 | Refills: 0 | Status: DISCONTINUED | OUTPATIENT
Start: 2017-11-26 | End: 2017-11-29

## 2017-11-26 RX ORDER — INSULIN HUMAN 100 [IU]/ML
1.5 INJECTION, SOLUTION SUBCUTANEOUS
Qty: 100 | Refills: 0 | Status: DISCONTINUED | OUTPATIENT
Start: 2017-11-26 | End: 2017-11-26

## 2017-11-26 RX ORDER — INSULIN GLARGINE 100 [IU]/ML
7 INJECTION, SOLUTION SUBCUTANEOUS EVERY MORNING
Qty: 0 | Refills: 0 | Status: DISCONTINUED | OUTPATIENT
Start: 2017-11-26 | End: 2017-11-28

## 2017-11-26 RX ORDER — HEPARIN SODIUM 5000 [USP'U]/ML
1400 INJECTION INTRAVENOUS; SUBCUTANEOUS
Qty: 25000 | Refills: 0 | Status: DISCONTINUED | OUTPATIENT
Start: 2017-11-26 | End: 2017-11-27

## 2017-11-26 RX ORDER — HEPARIN SODIUM 5000 [USP'U]/ML
1100 INJECTION INTRAVENOUS; SUBCUTANEOUS
Qty: 25000 | Refills: 0 | Status: DISCONTINUED | OUTPATIENT
Start: 2017-11-26 | End: 2017-11-26

## 2017-11-26 RX ORDER — INSULIN HUMAN 100 [IU]/ML
1 INJECTION, SOLUTION SUBCUTANEOUS
Qty: 100 | Refills: 0 | Status: DISCONTINUED | OUTPATIENT
Start: 2017-11-26 | End: 2017-11-26

## 2017-11-26 RX ORDER — DEXTROSE 50 % IN WATER 50 %
12.5 SYRINGE (ML) INTRAVENOUS ONCE
Qty: 0 | Refills: 0 | Status: DISCONTINUED | OUTPATIENT
Start: 2017-11-26 | End: 2017-11-29

## 2017-11-26 RX ADMIN — Medication 5: at 17:31

## 2017-11-26 RX ADMIN — Medication 50 MILLIGRAM(S): at 17:33

## 2017-11-26 RX ADMIN — SEVELAMER CARBONATE 800 MILLIGRAM(S): 2400 POWDER, FOR SUSPENSION ORAL at 17:33

## 2017-11-26 RX ADMIN — INSULIN HUMAN 1 UNIT(S)/HR: 100 INJECTION, SOLUTION SUBCUTANEOUS at 05:01

## 2017-11-26 RX ADMIN — INSULIN GLARGINE 7 UNIT(S): 100 INJECTION, SOLUTION SUBCUTANEOUS at 09:25

## 2017-11-26 RX ADMIN — Medication 200 GRAM(S): at 00:56

## 2017-11-26 RX ADMIN — HEPARIN SODIUM 11 UNIT(S)/HR: 5000 INJECTION INTRAVENOUS; SUBCUTANEOUS at 05:35

## 2017-11-26 RX ADMIN — OXYCODONE AND ACETAMINOPHEN 1 TABLET(S): 5; 325 TABLET ORAL at 01:12

## 2017-11-26 RX ADMIN — HEPARIN SODIUM 14 UNIT(S)/HR: 5000 INJECTION INTRAVENOUS; SUBCUTANEOUS at 23:06

## 2017-11-26 RX ADMIN — HEPARIN SODIUM 13 UNIT(S)/HR: 5000 INJECTION INTRAVENOUS; SUBCUTANEOUS at 22:26

## 2017-11-26 RX ADMIN — DULOXETINE HYDROCHLORIDE 60 MILLIGRAM(S): 30 CAPSULE, DELAYED RELEASE ORAL at 12:34

## 2017-11-26 RX ADMIN — CLOPIDOGREL BISULFATE 75 MILLIGRAM(S): 75 TABLET, FILM COATED ORAL at 22:10

## 2017-11-26 RX ADMIN — ATORVASTATIN CALCIUM 20 MILLIGRAM(S): 80 TABLET, FILM COATED ORAL at 22:10

## 2017-11-26 RX ADMIN — SEVELAMER CARBONATE 400 MILLIGRAM(S): 2400 POWDER, FOR SUSPENSION ORAL at 12:34

## 2017-11-26 RX ADMIN — OXYCODONE AND ACETAMINOPHEN 1 TABLET(S): 5; 325 TABLET ORAL at 01:42

## 2017-11-26 RX ADMIN — Medication 1 TABLET(S): at 12:34

## 2017-11-26 RX ADMIN — Medication 50 MILLIGRAM(S): at 05:35

## 2017-11-26 RX ADMIN — Medication 81 MILLIGRAM(S): at 12:34

## 2017-11-26 NOTE — CONSULT NOTE ADULT - SUBJECTIVE AND OBJECTIVE BOX
HPI:  62 year old female with PMHx T1DM on insulin pump, HTN, HLD, former smoker, MI, CAD s/p PCI to RCA and brachytherapy in Aug 2017, dCHF (last TTE- July 2017- mild MR, mild AS, mild-mod TR EF65%) chronic LLE pain and edema in setting of severe PVD s/p L & R fem-pop bypass  graft,  multiple vascular surgery both leg w/ fem-pop bypass revisions , Subclavian vein stenosis left s/p stent, ESRD on HD (Tu/Thr/Sat) via L AVF with oliguria, CVA with memory deficit, depression, chronic pain management for LLE on oxycodone who presents with chest pain and malaise found to have K of 7, and in DKA.    The patient has multiple admissions for similar presentation. Patient has a new pump, states it was working. States she has been compliant with her meds and did following dietary restriction. She was on HD on T/Th/Sat, but for this week was changed to M/W/F, she did not go for HD today. She started to have chest pain today with some n/v. Sub sternal on and off pain. No diaphoresis. C/O some mid-epigastric abn pain. Had similar chest pain with her previous MI 5 stents in the past. Stents in aug 17th to RCA. Presented to ED found to have K of 7, peaked T waves, with elevations in AVR, house cards was consulted, nephrology called for urgent HD. GIven albuterol nebs, calcium chloride x2, insulins IVP with D50 and started on insulin gtt.         PAST MEDICAL & SURGICAL HISTORY:  Subclavian vein stenosis, left: s/p stent  Cellulitis: 2015 left foot  DKA, type 1: 1/2015  ACS (acute coronary syndrome): 1/2015 - cath revealed 100% ostial stenosis not amenable to PCI - medical management  MI, old  TIA (transient ischemic attack): x 2 - 8-9 years ago prior to ASD/VSD repair  CAD (coronary artery disease): s/p stents  Murmur, cardiac  Gout: past  CVA (cerebral infarction): with no residual, 8 yrs ago, prior to heart surgery - ST memory loss  Peripheral vascular disease: occluded left fem-pop bypass 5/2015  Diabetes mellitus type 1: Insulin Dependent - [employer]  Minimed Paradigm Insulin Pump - Novolog  ESRD (end stage renal disease): dialysis , tue, thursday, saturday  Hyperlipidemia  Status post device closure of ASD: &quot;shantel&quot;  History of cardiac catheterization: 1/2015 - no intervention  S/P femoral-popliteal bypass surgery: L and R in 2013 with graft; 5/2015 CFA angioplasty left and ileofemoral endarterectomywith vein patch angioplasty of left fem-pop bypass graft  Multiple vascular surgery both leg, left fempop bypass revision 11/2015  AV (arteriovenous fistula): Left AV graft; revision with stent placement 2-3 years ago  S/P cholecystectomy      Review of Systems:   CONSTITUTIONAL: No fever, weight loss, or fatigue  EYES: No eye pain, visual disturbances, or discharge  ENMT:  No difficulty hearing, tinnitus, vertigo; No sinus or throat pain  NECK: No pain or stiffness  BREASTS: No pain, masses, or nipple discharge  RESPIRATORY: No cough, wheezing, chills or hemoptysis; No shortness of breath  CARDIOVASCULAR: + chest pain, palpitations, dizziness, or leg swelling  GASTROINTESTINAL: No abdominal or epigastric pain. No nausea, vomiting, or hematemesis; No diarrhea or constipation. No melena or hematochezia.  GENITOURINARY: No dysuria, frequency, hematuria, or incontinence  NEUROLOGICAL: No headaches, memory loss, loss of strength, numbness, or tremors  SKIN: No itching, burning, rashes, or lesions   LYMPH NODES: No enlarged glands  ENDOCRINE: No heat or cold intolerance; No hair loss  MUSCULOSKELETAL: No joint pain or swelling; No muscle, back, or extremity pain  PSYCHIATRIC: No depression, anxiety, mood swings, or difficulty sleeping  HEME/LYMPH: No easy bruising, or bleeding gums  ALLERY AND IMMUNOLOGIC: No hives or eczema    Allergies    No Known Allergies    Intolerances        Social History:     FAMILY HISTORY:  Family history of smoking  Family history of hypertension  Family history of cancer (Sibling)      MEDICATIONS  (STANDING):  aspirin enteric coated 81 milliGRAM(s) Oral daily  atorvastatin 20 milliGRAM(s) Oral at bedtime  clopidogrel Tablet 75 milliGRAM(s) Oral at bedtime  dextrose 5%. 1000 milliLiter(s) (50 mL/Hr) IV Continuous <Continuous>  dextrose 50% Injectable 12.5 Gram(s) IV Push once  dextrose 50% Injectable 25 Gram(s) IV Push once  dextrose 50% Injectable 25 Gram(s) IV Push once  DULoxetine 60 milliGRAM(s) Oral daily  heparin  Infusion 1100 Unit(s)/Hr (13 mL/Hr) IV Continuous <Continuous>  influenza   Vaccine 0.5 milliLiter(s) IntraMuscular once  insulin glargine Injectable (LANTUS) 7 Unit(s) SubCutaneous every morning  insulin lispro (HumaLOG) corrective regimen sliding scale   SubCutaneous three times a day before meals  insulin lispro (HumaLOG) corrective regimen sliding scale   SubCutaneous at bedtime  metoprolol     tartrate 50 milliGRAM(s) Oral two times a day  Nephro-raphael 1 Tablet(s) Oral daily  sevelamer hydrochloride 800 milliGRAM(s) Oral three times a day with meals    MEDICATIONS  (PRN):  dextrose Gel 1 Dose(s) Oral once PRN Blood Glucose LESS THAN 70 milliGRAM(s)/deciliter  glucagon  Injectable 1 milliGRAM(s) IntraMuscular once PRN Glucose LESS THAN 70 milligrams/deciliter        CAPILLARY BLOOD GLUCOSE  165 (26 Nov 2017 08:30)  167 (26 Nov 2017 07:00)  168 (26 Nov 2017 06:00)  208 (26 Nov 2017 05:00)  256 (26 Nov 2017 04:00)  253 (26 Nov 2017 03:00)  312 (26 Nov 2017 02:00)  272 (26 Nov 2017 01:00)  158 (26 Nov 2017 00:00)  124 (25 Nov 2017 23:30)  104 (25 Nov 2017 23:00)  128 (25 Nov 2017 22:00)  167 (25 Nov 2017 21:00)  184 (25 Nov 2017 20:00)  202 (25 Nov 2017 19:00)  79 (25 Nov 2017 16:00)      POCT Blood Glucose.: 200 mg/dL (26 Nov 2017 10:08)  POCT Blood Glucose.: 174 mg/dL (26 Nov 2017 09:19)  POCT Blood Glucose.: 165 mg/dL (26 Nov 2017 08:30)  POCT Blood Glucose.: 167 mg/dL (26 Nov 2017 07:09)  POCT Blood Glucose.: 168 mg/dL (26 Nov 2017 05:56)  POCT Blood Glucose.: 208 mg/dL (26 Nov 2017 04:57)  POCT Blood Glucose.: 256 mg/dL (26 Nov 2017 04:07)  POCT Blood Glucose.: 253 mg/dL (26 Nov 2017 03:01)  POCT Blood Glucose.: 312 mg/dL (26 Nov 2017 02:12)  POCT Blood Glucose.: 272 mg/dL (26 Nov 2017 01:06)  POCT Blood Glucose.: 158 mg/dL (25 Nov 2017 23:58)  POCT Blood Glucose.: 124 mg/dL (25 Nov 2017 23:31)  POCT Blood Glucose.: 104 mg/dL (25 Nov 2017 22:59)  POCT Blood Glucose.: 128 mg/dL (25 Nov 2017 22:03)  POCT Blood Glucose.: 167 mg/dL (25 Nov 2017 21:00)  POCT Blood Glucose.: 184 mg/dL (25 Nov 2017 20:07)  POCT Blood Glucose.: 202 mg/dL (25 Nov 2017 19:03)  POCT Blood Glucose.: 189 mg/dL (25 Nov 2017 18:00)  POCT Blood Glucose.: 186 mg/dL (25 Nov 2017 17:02)  POCT Blood Glucose.: 204 mg/dL (25 Nov 2017 16:33)  POCT Blood Glucose.: 79 mg/dL (25 Nov 2017 16:00)    I&O's Summary    25 Nov 2017 07:01  -  26 Nov 2017 07:00  --------------------------------------------------------  IN: 2144.5 mL / OUT: 0 mL / NET: 2144.5 mL    26 Nov 2017 07:01  -  26 Nov 2017 15:08  --------------------------------------------------------  IN: 711 mL / OUT: 0 mL / NET: 711 mL        PHYSICAL EXAM:  GENERAL: NAD, well-developed  HEAD:  Atraumatic, Normocephalic  EYES: EOMI, PERRLA, conjunctiva and sclera clear  NECK: Supple, No JVD  CHEST/LUNG: Clear to auscultation bilaterally; No wheeze  HEART: Regular rate and rhythm; No murmurs, rubs, or gallops  ABDOMEN: Soft, Nontender, Nondistended; Bowel sounds present  EXTREMITIES:  2+ Peripheral Pulses, No clubbing, cyanosis, or edema  PSYCH: AAOx3  NEUROLOGY: non-focal  SKIN: No rashes or lesions    LABS:                        10.7   3.5   )-----------( 184      ( 26 Nov 2017 12:32 )             32.2     11-26    130<L>  |  90<L>  |  27<H>  ----------------------------<  330<H>  5.7<H>   |  25  |  6.27<H>    Ca    8.5      26 Nov 2017 12:31  Phos  4.8     11-26  Mg     2.6     11-26    TPro  5.8<L>  /  Alb  3.4  /  TBili  0.3  /  DBili  x   /  AST  88<H>  /  ALT  23  /  AlkPhos  205<H>  11-26    PT/INR - ( 26 Nov 2017 04:17 )   PT: 10.7 sec;   INR: 0.99 ratio         PTT - ( 26 Nov 2017 12:32 )  PTT:27.1 sec  CARDIAC MARKERS ( 26 Nov 2017 04:17 )  x     / 3.98 ng/mL / 254 U/L / x     / 19.5 ng/mL  CARDIAC MARKERS ( 25 Nov 2017 06:31 )  x     / 2.09 ng/mL / 426 U/L / x     / 39.7 ng/mL  CARDIAC MARKERS ( 25 Nov 2017 01:47 )  x     / 0.64 ng/mL / 261 U/L / x     / 10.9 ng/mL  CARDIAC MARKERS ( 24 Nov 2017 21:56 )  x     / 0.66 ng/mL / 276 U/L / x     / 7.8 ng/mL          RADIOLOGY & ADDITIONAL TESTS:    Imaging Personally Reviewed:    Consultant(s) Notes Reviewed:      Care Discussed with Consultants/Other Providers:

## 2017-11-26 NOTE — PROGRESS NOTE ADULT - SUBJECTIVE AND OBJECTIVE BOX
[facility]   [phone]  RENAL FOLLOW UP NOTE  --------------------------------------------------------------------------------  Chief Complaint:    24 hour events/subjective:    still lethargic     PAST HISTORY  --------------------------------------------------------------------------------  No significant changes to PMH, PSH, FHx, SHx, unless otherwise noted    ALLERGIES & MEDICATIONS  --------------------------------------------------------------------------------  Allergies    No Known Allergies    Intolerances      Standing Inpatient Medications  aspirin enteric coated 81 milliGRAM(s) Oral daily  atorvastatin 20 milliGRAM(s) Oral at bedtime  clopidogrel Tablet 75 milliGRAM(s) Oral at bedtime  dextrose 20%. 1000 milliLiter(s) IV Continuous <Continuous>  DULoxetine 60 milliGRAM(s) Oral daily  heparin  Infusion 1100 Unit(s)/Hr IV Continuous <Continuous>  influenza   Vaccine 0.5 milliLiter(s) IntraMuscular once  insulin glargine Injectable (LANTUS) 7 Unit(s) SubCutaneous every morning  insulin Infusion 1 Unit(s)/Hr IV Continuous <Continuous>  metoprolol     tartrate 50 milliGRAM(s) Oral two times a day  Nephro-raphael 1 Tablet(s) Oral daily  sevelamer hydrochloride 400 milliGRAM(s) Oral three times a day with meals    PRN Inpatient Medications      REVIEW OF SYSTEMS  --------------------------------------------------------------------------------    Gen: denies fatigue, fevers/chills,  CVS: denies chest pain/palpitations  Resp: denies SOB/Cough  GI: Denies N/V/Abd pain  : Denies dysuria/oliguria/hematuria    All other systems were reviewed and are negative, except as noted.    VITALS/PHYSICAL EXAM  --------------------------------------------------------------------------------  T(C): 36.8 (11-26-17 @ 08:00), Max: 36.9 (11-25-17 @ 16:00)  HR: 68 (11-26-17 @ 10:00) (65 - 96)  BP: 120/50 (11-26-17 @ 10:00) (87/44 - 168/72)  RR: 19 (11-26-17 @ 10:00) (9 - 20)  SpO2: 93% (11-26-17 @ 10:00) (91% - 97%)  Wt(kg): --  Height (cm): 160.02 (11-25-17 @ 00:45)  Weight (kg): 53.1 (11-25-17 @ 00:45)  BMI (kg/m2): 20.7 (11-25-17 @ 00:45)  BSA (m2): 1.54 (11-25-17 @ 00:45)      11-25-17 @ 07:01  -  11-26-17 @ 07:00  --------------------------------------------------------  IN: 2144.5 mL / OUT: 0 mL / NET: 2144.5 mL      Physical Exam:  	Gen: alert oriented but lethargic   	Pulm: Decreased breath sounds b/l bases. + rales   	CV: RRR, S1/S2. no rub  	Back: No CVA tenderness; no sacral edema  	Abd: +BS, soft, nontender/nondistended  	: No suprapubic tenderness.               Extremity: No cyanosis, no edema no clubbing  	    LABS/STUDIES  --------------------------------------------------------------------------------              10.6   4.8   >-----------<  194      [11-26-17 @ 04:17]              31.3     133  |  93  |  26  ----------------------------<  180      [11-26-17 @ 08:49]  4.8   |  25  |  6.06        Ca     8.4     [11-26-17 @ 08:49]      Mg     2.2     [11-26-17 @ 08:49]      Phos  4.4     [11-26-17 @ 08:49]    TPro  5.8  /  Alb  3.4  /  TBili  0.3  /  DBili  x   /  AST  88  /  ALT  23  /  AlkPhos  205  [11-26-17 @ 04:17]    PT/INR: PT 10.7 , INR 0.99       [11-26-17 @ 04:17]  PTT: 35.1       [11-26-17 @ 04:17]    Troponin 3.98      [11-26-17 @ 04:17]        [11-26-17 @ 04:17]    Creatinine Trend:  SCr 6.06 [11-26 @ 08:49]  SCr 5.98 [11-26 @ 04:17]  SCr 5.55 [11-26 @ 00:17]  SCr 5.47 [11-25 @ 22:39]  SCr 5.18 [11-25 @ 18:33]        HbA1c 8.5      [11-25-17 @ 04:12]

## 2017-11-26 NOTE — PROGRESS NOTE ADULT - SUBJECTIVE AND OBJECTIVE BOX
CARDIOLOGY FOLLOW UP NOTE - DR. PORTILLO (for DR. Vela)    Subjective:    no cp, sob    PHYSICAL EXAM:  T(C): 36.8 (11-26-17 @ 04:00), Max: 36.9 (11-25-17 @ 16:00)  HR: 77 (11-26-17 @ 06:00) (77 - 96)  BP: 168/71 (11-26-17 @ 06:00) (71/35 - 168/72)  RR: 13 (11-26-17 @ 06:00) (13 - 20)  SpO2: 96% (11-26-17 @ 06:00) (91% - 97%)  Wt(kg): --  I&O's Summary    25 Nov 2017 07:01  -  26 Nov 2017 07:00  --------------------------------------------------------  IN: 2144.5 mL / OUT: 0 mL / NET: 2144.5 mL        Appearance: Normal	  Cardiovascular: Normal S1 S2,RRR, No JVD, No murmurs  Respiratory: Lungs clear to auscultation	  Gastrointestinal:  Soft, Non-tender, + BS	  Extremities: Normal range of motion, No clubbing, cyanosis or edema      MEDICATIONS  (STANDING):  aspirin enteric coated 81 milliGRAM(s) Oral daily  atorvastatin 20 milliGRAM(s) Oral at bedtime  clopidogrel Tablet 75 milliGRAM(s) Oral at bedtime  dextrose 20%. 1000 milliLiter(s) (30 mL/Hr) IV Continuous <Continuous>  DULoxetine 60 milliGRAM(s) Oral daily  heparin  Infusion 1100 Unit(s)/Hr (11 mL/Hr) IV Continuous <Continuous>  influenza   Vaccine 0.5 milliLiter(s) IntraMuscular once  insulin glargine Injectable (LANTUS) 7 Unit(s) SubCutaneous every morning  insulin Infusion 1 Unit(s)/Hr (1 mL/Hr) IV Continuous <Continuous>  metoprolol     tartrate 50 milliGRAM(s) Oral two times a day  Nephro-raphael 1 Tablet(s) Oral daily  sevelamer hydrochloride 400 milliGRAM(s) Oral three times a day with meals      TELEMETRY: 	    ECG:  	  RADIOLOGY:   DIAGNOSTIC TESTING:  [ ] Echocardiogram:  [ ] Catheterization:  [ ] Stress Test:    OTHER: 	    LABS:	 	    CARDIAC MARKERS:                                10.6   4.8   )-----------( 194      ( 26 Nov 2017 04:17 )             31.3     11-26    133<L>  |  92<L>  |  25<H>  ----------------------------<  278<H>  5.0   |  25  |  5.98<H>    Ca    8.7      26 Nov 2017 04:17  Phos  4.4     11-26  Mg     2.2     11-26    TPro  5.8<L>  /  Alb  3.4  /  TBili  0.3  /  DBili  x   /  AST  88<H>  /  ALT  23  /  AlkPhos  205<H>  11-26    proBNP:   PT/INR - ( 26 Nov 2017 04:17 )   PT: 10.7 sec;   INR: 0.99 ratio         PTT - ( 26 Nov 2017 04:17 )  PTT:35.1 sec  Lipid Profile:   HgA1c:

## 2017-11-26 NOTE — PROGRESS NOTE ADULT - ATTENDING COMMENTS
AG closed has baseline AG. Off drip since 11 AM. On lantus and peremeal. Cards f/u appd re troponenemia. possible cath.  HD tomorrow  Can be downgraded to tele not complaining of CP or SOB

## 2017-11-26 NOTE — PROGRESS NOTE ADULT - SUBJECTIVE AND OBJECTIVE BOX
CHIEF COMPLAINT:    Interval Events: No acute events overnight. Anion gap closed with insulin drip. C/w heparin gtt. No chest pain or pressure in the am.    REVIEW OF SYSTEMS:  EYES/ENT: No visual changes;  No vertigo or throat pain   NECK: No pain or stiffness  RESPIRATORY: No cough, wheezing, hemoptysis; No shortness of breath  CARDIOVASCULAR: No chest pain or palpitations at the time of exam.  GASTROINTESTINAL: No abdominal or epigastric pain. No nausea, vomiting, or hematemesis; No diarrhea or constipation. No melena or hematochezia.  GENITOURINARY: No dysuria, frequency or hematuria  NEUROLOGICAL: No numbness or weakness  SKIN: No itching, burning, rashes, or lesions     T(C): 36.8 (11-26-17 @ 04:00), Max: 36.9 (11-25-17 @ 16:00)  HR: 77 (11-26-17 @ 06:00) (77 - 96)  BP: 168/71 (11-26-17 @ 06:00) (71/35 - 168/72)  RR: 13 (11-26-17 @ 06:00) (13 - 26)  SpO2: 96% (11-26-17 @ 06:00) (91% - 97%)  Wt(kg): --  GENERAL: NAD, well-developed  HEAD:  Atraumatic, Normocephalic  EYES: EOMI, PERRLA, conjunctiva and sclera clear  NECK: Supple, No JVD  CHEST/LUNG: Clear to auscultation bilaterally; No wheeze  HEART: Regular rate and rhythm; No murmurs, rubs, or gallops  ABDOMEN: Soft, Nontender, Nondistended; Bowel sounds present  EXTREMITIES:  2+ Peripheral Pulses, No clubbing, cyanosis, or edema  PSYCH: AAOx3  NEUROLOGY: non-focal  SKIN: No rashes or lesions    HOSPITAL MEDICATIONS:  aspirin enteric coated 81 milliGRAM(s) Oral daily  clopidogrel Tablet 75 milliGRAM(s) Oral at bedtime  heparin  Infusion 1100 Unit(s)/Hr IV Continuous <Continuous>      metoprolol     tartrate 50 milliGRAM(s) Oral two times a day    atorvastatin 20 milliGRAM(s) Oral at bedtime  insulin glargine Injectable (LANTUS) 7 Unit(s) SubCutaneous every morning  insulin Infusion 1 Unit(s)/Hr IV Continuous <Continuous>      DULoxetine 60 milliGRAM(s) Oral daily          dextrose 20%. 1000 milliLiter(s) IV Continuous <Continuous>  Nephro-raphael 1 Tablet(s) Oral daily    influenza   Vaccine 0.5 milliLiter(s) IntraMuscular once      sevelamer hydrochloride 400 milliGRAM(s) Oral three times a day with meals      LABS:                        10.6   4.8   )-----------( 194      ( 26 Nov 2017 04:17 )             31.3     Hgb Trend: 10.6<--, 9.9<--, 12.5<--  11-26    133<L>  |  92<L>  |  25<H>  ----------------------------<  278<H>  5.0   |  25  |  5.98<H>    Ca    8.7      26 Nov 2017 04:17  Phos  4.4     11-26  Mg     2.2     11-26    TPro  5.8<L>  /  Alb  3.4  /  TBili  0.3  /  DBili  x   /  AST  88<H>  /  ALT  23  /  AlkPhos  205<H>  11-26    Creatinine Trend: 5.98<--, 5.55<--, 5.47<--, 5.18<--, 5.05<--, 4.63<--  PT/INR - ( 26 Nov 2017 04:17 )   PT: 10.7 sec;   INR: 0.99 ratio         PTT - ( 26 Nov 2017 04:17 )  PTT:35.1 sec      Venous Blood Gas:  11-25 @ 23:40  7.38/45/37/26/68  VBG Lactate: 2.7  Venous Blood Gas:  11-25 @ 18:20  7.38/44/47/26/82  VBG Lactate: 1.6  Venous Blood Gas:  11-25 @ 01:38  7.30/26/100/12/96  VBG Lactate: 7.8  Venous Blood Gas:  11-24 @ 21:56  7.25/32/46/14/69  VBG Lactate: 4.2

## 2017-11-26 NOTE — CONSULT NOTE ADULT - ASSESSMENT
62 year old woman with history of T1DM on insulin pump, HTN, HLD, former smoker, MI, CAD s/p PCI to RCA and brachytherapy in Aug 2017, dCHF, chronic LLE pain, severe PVD s/p L & R fem-pop bypass graft,  multiple vascular surgery both leg w/ fem-pop bypass revisions, Subclavian vein stenosis left s/p stent, ESRD on HD (Tu/Thr/Sat) via L AVF with oliguria, CVA with memory deficit, depression, admitted with chest pain and malaise found to have K of 7, and in DKA.      1. Chest Pain, NSTEMI  in setting of metabolic abnl/DKA  pt with complicated coronary history with multiple PCI's, recurrent RCA restenosis, s/p brachytherapy  recently with pci to ostial RCA  cont medical management of NSTEMI with IV heparin, asa, and plavix  echo to eval for change in lv fxn  no urgent indication for cath at present  once medically optimized will d/w with pt regarding cath   cont to trend ce's till peak     2. DKA  tx per micu     3. ESRD/HD  renal f/u    d/w micu team
62 year old female with PMHx T1DM on insulin pump, HTN, HLD, former smoker, MI, CAD s/p PCI to RCA and brachytherapy in Aug 2017, dCHF (last TTE- July 2017- mild MR, mild AS, mild-mod TR EF65%) chronic LLE pain and edema in setting of severe PVD s/p L & R fem-pop bypass  graft,  multiple vascular surgery both leg w/ fem-pop bypass revisions , Subclavian vein stenosis left s/p stent, ESRD on HD (Tu/Thr/Sat) via L AVF with oliguria, CVA with memory deficit, depression, chronic pain management for LLE on oxycodone who presents with chest pain and malaise found to have K of 7, and in DKA.      CV:   - BP currently stable.  - No signs of cardiogenic shock on bedside US.  - Trop continue to elevate overnight, CK downtrending. No CP or EKG changes at this time.   - C/w heparin gtt, asa, plavix for NSTEMI management.  cardiology follow/ possible cath.      Pulm: Sating well on RA. No acute issues.  - PET from 10/17 with ground glass opacity RUL. Will need further w/u .    Renal: Patient s/p urgent dialysis yesterday. Euvolemic on exam today.  - Continue to monitor electrolytes. Hyper K currently resolved along with EKG changes.  - HD/ Nephrology follow    DKA resolved . Gap closed. Urine ketone negative.  - Started lantus 7u this am. Will turn off gtt insulin 1h after.  - Started on renal diet.  - Continue to monitor FS goal 100-180 and anion gap at this time.  - Endocrine follow  Pierce Viera MD pager [phone]
63 yo F with hx DM, dka esrd cad htn PAD presented to er with 3 days c/o N/V and abd pain. last hd yesterday---> ER. glu > 700 k 7.1. hco3- 13. and suspected MI as well    1- ESRD  2- hyperkalemia   3- DKA  4- acidosis  5- ekg changes    6- abd pain   7- N/V.     icu consult  hd consent obtained and witnessed and in chart  urgent hd RN called [in hd unit ]  abd pain work up. ct scan abd   cards consult for EKG changes   pt is now s/p calcium chloride ivp in er   zofran 4 mg ivp  insulin drip   d/w icu team  d/w hd rn
62 yr F with T1DM A1C 8.5% c/b ESRD, CAD, PVD on insulin pump at home admitted with chest pain and found to have DKA

## 2017-11-26 NOTE — CHART NOTE - NSCHARTNOTEFT_GEN_A_CORE
MICU Transfer Note    Transfer from: MICU    Transfer to: (  ) Medicine    ( x ) Telemetry     (   ) RCU        (    ) Palliative         (   ) Stroke Unit          (   ) __________________    Accepting Physician: Dr. Viera  Signout given to: NP    MICU COURSE: 62 year old female with PMHx T1DM on insulin pump, HTN, HLD, former smoker, MI, CAD s/p PCI to RCA and brachytherapy in Aug 2017, dCHF (last TTE- July 2017- mild MR, mild AS, mild-mod TR EF65%) chronic LLE pain and edema in setting of severe PVD s/p L & R fem-pop bypass  graft,  multiple vascular surgery both leg w/ fem-pop bypass revisions , Subclavian vein stenosis left s/p stent, ESRD on HD (Tu/Thr/Sat) via L AVF with oliguria, CVA with memory deficit, depression, chronic pain management for LLE on oxycodone who presents with chest pain and malaise found to have K of 7, and in DKA. The patient has multiple admissions for similar presentation. Patient has a new pump, states it was working. States she has been compliant with her meds and did following dietary restriction. She was on HD on T/Th/Sat, but for this week was changed to M/W/F, she did not go for HD today. She started to have chest pain today with some n/v. Sub sternal on and off pain. No diaphoresis. C/O some mid-epigastric abn pain. Had similar chest pain with her previous MI 5 stents in the past. Stents in aug 17th to RCA. Presented to ED found to have K of 7, peaked T waves, with elevations in AVR, house cards was consulted, nephrology called for urgent HD. GIven albuterol nebs, calcium chloride x2, insulins IVP with D50 and started on insulin gtt. Dr. Vela cardiology group was contacted in the am and no acute cath needed. Patient was kept on asa, plavix and heparin gtt. Patient was placed on insulin drip with gap appropriately closed on 11/26 am. Endocrine made recommendations to start lantus 7u and insulin gtt turned off 2 hours later. Repeat BMP with closed gaps and patient tolerating diet. Decision made to transfer patient to regular telemetry floor for further management.    ASSESSMENT & PLAN:   62 year old female with PMHx T1DM on insulin pump, HTN, HLD, former smoker, MI, CAD s/p PCI to RCA and brachytherapy in Aug 2017, dCHF (last TTE- July 2017- mild MR, mild AS, mild-mod TR EF65%) chronic LLE pain and edema in setting of severe PVD s/p L & R fem-pop bypass  graft,  multiple vascular surgery both leg w/ fem-pop bypass revisions , Subclavian vein stenosis left s/p stent, ESRD on HD (Tu/Thr/Sat) via L AVF with oliguria, CVA with memory deficit, depression, chronic pain management for LLE on oxycodone who presents with chest pain and malaise found to have K of 7, and in DKA.    Neuro: Currently AOX3, no issues.    CV:   - BP currently stable.  - No signs of cardiogenic shock on bedside US.  - Trop continue to elevate overnight, CK downtrending. No CP or EKG changes at this time.   - C/w heparin gtt, asa, plavix for NSTEMI management.      Pulm: Sating well on RA. No acute issues.  - PET from 10/17 with ground glass opacity RUL. Will need further w/u in the context of 30pack/year smoking history.    Renal: Patient s/p urgent dialysis yesterday. Euvolemic on exam today.  - Continue to monitor electrolytes. Hyper K currently resolved along with EKG changes.  - Appreciate further renal recs regarding dialysis management.    Endo: DKA resolved overnight. Gap closed. Urine ketone negative.  - Started lantus 7u this am. Will turn off gtt insulin 1h after.  - Started on renal diet.  - Continue to monitor FS goal 100-180 and anion gap at this time.    ID: no issues.  GI: Started on renal diet.  PPX: On heparin gtt for full AC.      FOR FOLLOW UP:  - Cardiology recs.  - Endocrine recs.  - HD on Monday

## 2017-11-27 LAB
ANION GAP SERPL CALC-SCNC: 18 MMOL/L — HIGH (ref 5–17)
APTT BLD: 129.7 SEC — CRITICAL HIGH (ref 27.5–37.4)
APTT BLD: 189.8 SEC — CRITICAL HIGH (ref 27.5–37.4)
APTT BLD: 70.2 SEC — HIGH (ref 27.5–37.4)
BUN SERPL-MCNC: 36 MG/DL — HIGH (ref 7–23)
CALCIUM SERPL-MCNC: 8.5 MG/DL — SIGNIFICANT CHANGE UP (ref 8.4–10.5)
CHLORIDE SERPL-SCNC: 90 MMOL/L — LOW (ref 96–108)
CK MB CFR SERPL CALC: 5.5 NG/ML — HIGH (ref 0–3.8)
CK SERPL-CCNC: 97 U/L — SIGNIFICANT CHANGE UP (ref 25–170)
CO2 SERPL-SCNC: 22 MMOL/L — SIGNIFICANT CHANGE UP (ref 22–31)
CREAT SERPL-MCNC: 7.42 MG/DL — HIGH (ref 0.5–1.3)
GLUCOSE BLDC GLUCOMTR-MCNC: 198 MG/DL — HIGH (ref 70–99)
GLUCOSE BLDC GLUCOMTR-MCNC: 67 MG/DL — LOW (ref 70–99)
GLUCOSE BLDC GLUCOMTR-MCNC: 75 MG/DL — SIGNIFICANT CHANGE UP (ref 70–99)
GLUCOSE BLDC GLUCOMTR-MCNC: 81 MG/DL — SIGNIFICANT CHANGE UP (ref 70–99)
GLUCOSE BLDC GLUCOMTR-MCNC: 85 MG/DL — SIGNIFICANT CHANGE UP (ref 70–99)
GLUCOSE BLDC GLUCOMTR-MCNC: 86 MG/DL — SIGNIFICANT CHANGE UP (ref 70–99)
GLUCOSE BLDC GLUCOMTR-MCNC: 87 MG/DL — SIGNIFICANT CHANGE UP (ref 70–99)
GLUCOSE BLDC GLUCOMTR-MCNC: 89 MG/DL — SIGNIFICANT CHANGE UP (ref 70–99)
GLUCOSE BLDC GLUCOMTR-MCNC: 91 MG/DL — SIGNIFICANT CHANGE UP (ref 70–99)
GLUCOSE SERPL-MCNC: 167 MG/DL — HIGH (ref 70–99)
HCT VFR BLD CALC: 29 % — LOW (ref 34.5–45)
HCT VFR BLD CALC: 29.1 % — LOW (ref 34.5–45)
HGB BLD-MCNC: 9.4 G/DL — LOW (ref 11.5–15.5)
HGB BLD-MCNC: 9.7 G/DL — LOW (ref 11.5–15.5)
MAGNESIUM SERPL-MCNC: 2.2 MG/DL — SIGNIFICANT CHANGE UP (ref 1.6–2.6)
MCHC RBC-ENTMCNC: 32.3 GM/DL — SIGNIFICANT CHANGE UP (ref 32–36)
MCHC RBC-ENTMCNC: 33.2 PG — SIGNIFICANT CHANGE UP (ref 27–34)
MCHC RBC-ENTMCNC: 33.5 GM/DL — SIGNIFICANT CHANGE UP (ref 32–36)
MCHC RBC-ENTMCNC: 34.9 PG — HIGH (ref 27–34)
MCV RBC AUTO: 102.8 FL — HIGH (ref 80–100)
MCV RBC AUTO: 104 FL — HIGH (ref 80–100)
PLATELET # BLD AUTO: 166 K/UL — SIGNIFICANT CHANGE UP (ref 150–400)
PLATELET # BLD AUTO: 177 K/UL — SIGNIFICANT CHANGE UP (ref 150–400)
POTASSIUM SERPL-MCNC: 5.3 MMOL/L — SIGNIFICANT CHANGE UP (ref 3.5–5.3)
POTASSIUM SERPL-SCNC: 5.3 MMOL/L — SIGNIFICANT CHANGE UP (ref 3.5–5.3)
RBC # BLD: 2.78 M/UL — LOW (ref 3.8–5.2)
RBC # BLD: 2.83 M/UL — LOW (ref 3.8–5.2)
RBC # FLD: 12.4 % — SIGNIFICANT CHANGE UP (ref 10.3–14.5)
RBC # FLD: 13.7 % — SIGNIFICANT CHANGE UP (ref 10.3–14.5)
SODIUM SERPL-SCNC: 130 MMOL/L — LOW (ref 135–145)
TROPONIN T SERPL-MCNC: 4.13 NG/ML — HIGH (ref 0–0.06)
WBC # BLD: 3 K/UL — LOW (ref 3.8–10.5)
WBC # BLD: 3.5 K/UL — LOW (ref 3.8–10.5)
WBC # FLD AUTO: 3 K/UL — LOW (ref 3.8–10.5)
WBC # FLD AUTO: 3.5 K/UL — LOW (ref 3.8–10.5)

## 2017-11-27 PROCEDURE — 93306 TTE W/DOPPLER COMPLETE: CPT | Mod: 26

## 2017-11-27 PROCEDURE — 99232 SBSQ HOSP IP/OBS MODERATE 35: CPT

## 2017-11-27 RX ORDER — HEPARIN SODIUM 5000 [USP'U]/ML
1150 INJECTION INTRAVENOUS; SUBCUTANEOUS
Qty: 25000 | Refills: 0 | Status: DISCONTINUED | OUTPATIENT
Start: 2017-11-27 | End: 2017-11-27

## 2017-11-27 RX ORDER — ERYTHROPOIETIN 10000 [IU]/ML
10000 INJECTION, SOLUTION INTRAVENOUS; SUBCUTANEOUS ONCE
Qty: 0 | Refills: 0 | Status: COMPLETED | OUTPATIENT
Start: 2017-11-27 | End: 2017-11-27

## 2017-11-27 RX ORDER — HEPARIN SODIUM 5000 [USP'U]/ML
1250 INJECTION INTRAVENOUS; SUBCUTANEOUS
Qty: 25000 | Refills: 0 | Status: DISCONTINUED | OUTPATIENT
Start: 2017-11-27 | End: 2017-11-27

## 2017-11-27 RX ORDER — INSULIN LISPRO 100/ML
VIAL (ML) SUBCUTANEOUS
Qty: 0 | Refills: 0 | Status: DISCONTINUED | OUTPATIENT
Start: 2017-11-27 | End: 2017-11-28

## 2017-11-27 RX ORDER — HEPARIN SODIUM 5000 [USP'U]/ML
3200 INJECTION INTRAVENOUS; SUBCUTANEOUS EVERY 6 HOURS
Qty: 0 | Refills: 0 | Status: DISCONTINUED | OUTPATIENT
Start: 2017-11-27 | End: 2017-11-27

## 2017-11-27 RX ADMIN — ATORVASTATIN CALCIUM 20 MILLIGRAM(S): 80 TABLET, FILM COATED ORAL at 21:35

## 2017-11-27 RX ADMIN — HEPARIN SODIUM 1250 UNIT(S)/HR: 5000 INJECTION INTRAVENOUS; SUBCUTANEOUS at 08:59

## 2017-11-27 RX ADMIN — CLOPIDOGREL BISULFATE 75 MILLIGRAM(S): 75 TABLET, FILM COATED ORAL at 21:35

## 2017-11-27 RX ADMIN — SEVELAMER CARBONATE 800 MILLIGRAM(S): 2400 POWDER, FOR SUSPENSION ORAL at 08:19

## 2017-11-27 RX ADMIN — Medication 81 MILLIGRAM(S): at 11:41

## 2017-11-27 RX ADMIN — ERYTHROPOIETIN 10000 UNIT(S): 10000 INJECTION, SOLUTION INTRAVENOUS; SUBCUTANEOUS at 16:29

## 2017-11-27 RX ADMIN — Medication 1: at 08:16

## 2017-11-27 RX ADMIN — SEVELAMER CARBONATE 800 MILLIGRAM(S): 2400 POWDER, FOR SUSPENSION ORAL at 18:28

## 2017-11-27 RX ADMIN — INSULIN GLARGINE 7 UNIT(S): 100 INJECTION, SOLUTION SUBCUTANEOUS at 08:17

## 2017-11-27 RX ADMIN — Medication 50 MILLIGRAM(S): at 18:28

## 2017-11-27 RX ADMIN — Medication 1 TABLET(S): at 11:41

## 2017-11-27 RX ADMIN — SEVELAMER CARBONATE 800 MILLIGRAM(S): 2400 POWDER, FOR SUSPENSION ORAL at 11:41

## 2017-11-27 RX ADMIN — DULOXETINE HYDROCHLORIDE 60 MILLIGRAM(S): 30 CAPSULE, DELAYED RELEASE ORAL at 11:41

## 2017-11-27 RX ADMIN — Medication 50 MILLIGRAM(S): at 05:51

## 2017-11-27 RX ADMIN — HEPARIN SODIUM 1250 UNIT(S)/HR: 5000 INJECTION INTRAVENOUS; SUBCUTANEOUS at 15:58

## 2017-11-27 NOTE — PROGRESS NOTE ADULT - SUBJECTIVE AND OBJECTIVE BOX
DIABETES FOLLOW UP NOTE: Saw pt earlier today  INTERVAL HX: 61 y/o F w/h/o T1DM with multiple complications and comorbidities including ESRD>on HD and CAD s/p  stenting. Here with DKA/CP. Here with non ST elevation MI with classic cp and ecg changes and positive enzymes  Glycemic control     Review of Systems:  Cardiovascular: No chest pain, palpitations  Respiratory: No SOB, no cough  GI: No nausea, vomiting, abdominal pain  Endocrine: no polyuria, polydipsia or S&Sx of hypoglycemia    Allergies    No Known Allergies    Intolerances      MEDICATIONS  (STANDING):  aspirin enteric coated 81 milliGRAM(s) Oral daily  atorvastatin 20 milliGRAM(s) Oral at bedtime  clopidogrel Tablet 75 milliGRAM(s) Oral at bedtime  dextrose 5%. 1000 milliLiter(s) (50 mL/Hr) IV Continuous <Continuous>  dextrose 50% Injectable 12.5 Gram(s) IV Push once  dextrose 50% Injectable 25 Gram(s) IV Push once  dextrose 50% Injectable 25 Gram(s) IV Push once  DULoxetine 60 milliGRAM(s) Oral daily  epoetin azalea Injectable 12854 Unit(s) IV Push once  heparin  Infusion. 1250 Unit(s)/Hr (12.5 mL/Hr) IV Continuous <Continuous>  influenza   Vaccine 0.5 milliLiter(s) IntraMuscular once  insulin glargine Injectable (LANTUS) 7 Unit(s) SubCutaneous every morning  insulin lispro (HumaLOG) corrective regimen sliding scale   SubCutaneous three times a day before meals  insulin lispro (HumaLOG) corrective regimen sliding scale   SubCutaneous at bedtime  metoprolol     tartrate 50 milliGRAM(s) Oral two times a day  Nephro-raphael 1 Tablet(s) Oral daily  sevelamer hydrochloride 800 milliGRAM(s) Oral three times a day with meals      PHYSICAL EXAM:  VITALS: T(C): 36.7 (11-27-17 @ 04:11)  T(F): 98 (11-27-17 @ 04:11), Max: 98 (11-27-17 @ 04:11)  HR: 70 (11-27-17 @ 04:11) (67 - 72)  BP: 147/78 (11-27-17 @ 04:11) (147/78 - 162/99)  RR:  (16 - 18)  SpO2:  (92% - 95%)  Wt(kg): --  GENERAL: NAD  Abdomen: Soft, nontender, non distended, normal bowel sounds  Extremities:   NEURO: sensation intact, extraocular movements intact, no tremor, normal reflexes    POCT Blood Glucose.: 91 mg/dL (11-27-17 @ 15:02)  POCT Blood Glucose.: 87 mg/dL (11-27-17 @ 13:34)  POCT Blood Glucose.: 75 mg/dL (11-27-17 @ 11:53)  POCT Blood Glucose.: 198 mg/dL (11-27-17 @ 07:59)  POCT Blood Glucose.: 155 mg/dL (11-26-17 @ 21:37)  POCT Blood Glucose.: 372 mg/dL (11-26-17 @ 16:39)  POCT Blood Glucose.: 200 mg/dL (11-26-17 @ 10:08)  POCT Blood Glucose.: 174 mg/dL (11-26-17 @ 09:19)  POCT Blood Glucose.: 165 mg/dL (11-26-17 @ 08:30)  POCT Blood Glucose.: 167 mg/dL (11-26-17 @ 07:09)  POCT Blood Glucose.: 168 mg/dL (11-26-17 @ 05:56)  POCT Blood Glucose.: 208 mg/dL (11-26-17 @ 04:57)  POCT Blood Glucose.: 256 mg/dL (11-26-17 @ 04:07)  POCT Blood Glucose.: 253 mg/dL (11-26-17 @ 03:01)  POCT Blood Glucose.: 312 mg/dL (11-26-17 @ 02:12)  POCT Blood Glucose.: 272 mg/dL (11-26-17 @ 01:06)  POCT Blood Glucose.: 158 mg/dL (11-25-17 @ 23:58)  POCT Blood Glucose.: 124 mg/dL (11-25-17 @ 23:31)  POCT Blood Glucose.: 104 mg/dL (11-25-17 @ 22:59)  POCT Blood Glucose.: 128 mg/dL (11-25-17 @ 22:03)  POCT Blood Glucose.: 167 mg/dL (11-25-17 @ 21:00)  POCT Blood Glucose.: 184 mg/dL (11-25-17 @ 20:07)  POCT Blood Glucose.: 202 mg/dL (11-25-17 @ 19:03)  POCT Blood Glucose.: 189 mg/dL (11-25-17 @ 18:00)  POCT Blood Glucose.: 186 mg/dL (11-25-17 @ 17:02)  POCT Blood Glucose.: 204 mg/dL (11-25-17 @ 16:33)  POCT Blood Glucose.: 79 mg/dL (11-25-17 @ 16:00)  POCT Blood Glucose.: 86 mg/dL (11-25-17 @ 15:06)  POCT Blood Glucose.: 122 mg/dL (11-25-17 @ 14:05)  POCT Blood Glucose.: 183 mg/dL (11-25-17 @ 13:04)  POCT Blood Glucose.: 289 mg/dL (11-25-17 @ 12:01)  POCT Blood Glucose.: 351 mg/dL (11-25-17 @ 11:16)  POCT Blood Glucose.: 345 mg/dL (11-25-17 @ 10:20)  POCT Blood Glucose.: 275 mg/dL (11-25-17 @ 09:02)  POCT Blood Glucose.: 222 mg/dL (11-25-17 @ 08:08)  POCT Blood Glucose.: 200 mg/dL (11-25-17 @ 06:54)  POCT Blood Glucose.: 203 mg/dL (11-25-17 @ 06:08)  POCT Blood Glucose.: 179 mg/dL (11-25-17 @ 05:04)  POCT Blood Glucose.: 207 mg/dL (11-25-17 @ 04:14)  POCT Blood Glucose.: 259 mg/dL (11-25-17 @ 03:18)  POCT Blood Glucose.: 408 mg/dL (11-25-17 @ 02:21)  POCT Blood Glucose.: >600 mg/dL (11-25-17 @ 00:43)  POCT Blood Glucose.: >600 mg/dL (11-24-17 @ 23:00)  POCT Blood Glucose.: >600 mg/dL (11-24-17 @ 21:25)                            9.7    3.0   )-----------( 166      ( 27 Nov 2017 15:25 )             29.0       11-27    130<L>  |  90<L>  |  36<H>  ----------------------------<  167<H>  5.3   |  22  |  7.42<H>    EGFR if : 6<L>  EGFR if non : 5<L>    Ca    8.5      11-27  Mg     2.2     11-27  Phos  4.8     11-26    TPro  5.8<L>  /  Alb  3.4  /  TBili  0.3  /  DBili  x   /  AST  88<H>  /  ALT  23  /  AlkPhos  205<H>  11-26      Thyroid Function Tests:      Hemoglobin A1C, Whole Blood: 8.5 % <H> [4.0 - 5.6] (11-25-17 @ 04:12) DIABETES FOLLOW UP NOTE: Saw pt earlier today  INTERVAL HX: 63 y/o F w/h/o T1DM with multiple complications and comorbidities including ESRD>on HD and CAD s/p  stenting. Here with DKA/CP. Found to have non ST elevation MI follow by cardiology. On heparin drip.   Glycemic control variable up to 300s yesterday and  70s today. Pt reports tolerating POs but per staff pt with poor PO intake. Going for HD today    Review of Systems:  Cardiovascular: No chest pain, palpitations  Respiratory: No SOB, no cough  GI: No nausea, vomiting, abdominal pain  Endocrine: no polyuria, polydipsia or S&Sx of hypoglycemia    Allergies    No Known Allergies    Intolerances      MEDICATIONS:  atorvastatin 20 milliGRAM(s) Oral at bedtime  insulin glargine Injectable (LANTUS) 7 Unit(s) SubCutaneous every morning  insulin lispro (HumaLOG) corrective regimen sliding scale   SubCutaneous three times a day before meals  insulin lispro (HumaLOG) corrective regimen sliding scale   SubCutaneous at bedtime      PHYSICAL EXAM:  VITALS: T(C): 36.7 (11-27-17 @ 04:11)  T(F): 98 (11-27-17 @ 04:11), Max: 98 (11-27-17 @ 04:11)  HR: 70 (11-27-17 @ 04:11) (67 - 72)  BP: 147/78 (11-27-17 @ 04:11) (147/78 - 162/99)  RR:  (16 - 18)  SpO2:  (92% - 95%)  Wt(kg): --  GENERAL: Female lying in bed inNAD  Abdomen: Soft, nontender, non distended  Extremities: Warm, no edema in all 4 extremities  NEURO: A&O X3    LABS  POCT Blood Glucose.: 91 mg/dL (11-27-17 @ 15:02)  POCT Blood Glucose.: 87 mg/dL (11-27-17 @ 13:34)  POCT Blood Glucose.: 75 mg/dL (11-27-17 @ 11:53)  POCT Blood Glucose.: 198 mg/dL (11-27-17 @ 07:59)  POCT Blood Glucose.: 155 mg/dL (11-26-17 @ 21:37)  POCT Blood Glucose.: 372 mg/dL (11-26-17 @ 16:39)  POCT Blood Glucose.: 200 mg/dL (11-26-17 @ 10:08)  POCT Blood Glucose.: 174 mg/dL (11-26-17 @ 09:19)  POCT Blood Glucose.: 165 mg/dL (11-26-17 @ 08:30)  POCT Blood Glucose.: 167 mg/dL (11-26-17 @ 07:09)  POCT Blood Glucose.: 168 mg/dL (11-26-17 @ 05:56)  POCT Blood Glucose.: 208 mg/dL (11-26-17 @ 04:57)  POCT Blood Glucose.: 256 mg/dL (11-26-17 @ 04:07)  POCT Blood Glucose.: 253 mg/dL (11-26-17 @ 03:01)  POCT Blood Glucose.: 312 mg/dL (11-26-17 @ 02:12)  POCT Blood Glucose.: 272 mg/dL (11-26-17 @ 01:06)                          9.7    3.0   )-----------( 166      ( 27 Nov 2017 15:25 )             29.0       11-27    130<L>  |  90<L>  |  36<H>  ----------------------------<  167<H>  5.3   |  22  |  7.42<H>    EGFR if : 6<L>  EGFR if non : 5<L>    Ca    8.5      11-27  Mg     2.2     11-27  Phos  4.8     11-26    TPro  5.8<L>  /  Alb  3.4  /  TBili  0.3  /  DBili  x   /  AST  88<H>  /  ALT  23  /  AlkPhos  205<H>  11-26      Thyroid Function Tests:      Hemoglobin A1C, Whole Blood: 8.5 % <H> [4.0 - 5.6] (11-25-17 @ 04:12)

## 2017-11-27 NOTE — CHART NOTE - NSCHARTNOTEFT_GEN_A_CORE
Notified by RN that patient's ptt 189.9  Blood sample for ptt was drawn from the same arm with heparin running.    plan    Redraw ptt stat  hOLD hEPARIN GTT   Monitor ptt results  Signed out to day team    Husam RYDER BC  Spectra# [phone]

## 2017-11-27 NOTE — PROVIDER CONTACT NOTE (CRITICAL VALUE NOTIFICATION) - ASSESSMENT
Pt asymptomatic, no complaints. NP concerned that result is false due to being drawn on same arm as Heparin gtt running. Will turn gtt off for 20 minutes and retest. Pt asymptomatic, no complaints. NP concerned that result is false due to being drawn on same arm as Heparin gtt running. Will turn gtt off for 20 minutes and retest. Per NP Brandan, she will d/c current Heparin order and new order will be entered by day NP once new ptt results.

## 2017-11-27 NOTE — PROGRESS NOTE ADULT - SUBJECTIVE AND OBJECTIVE BOX
Patient is a 62y old  Female who presents with a chief complaint of CC hyperkalemia, chest pain, DKA (24 Nov 2017 23:48)      SUBJECTIVE / OVERNIGHT EVENTS: No new complaints.   Review of Systems  chest pain no  palpitations no  sob no  nausea no  headache no    MEDICATIONS  (STANDING):  aspirin enteric coated 81 milliGRAM(s) Oral daily  atorvastatin 20 milliGRAM(s) Oral at bedtime  clopidogrel Tablet 75 milliGRAM(s) Oral at bedtime  dextrose 5%. 1000 milliLiter(s) (50 mL/Hr) IV Continuous <Continuous>  dextrose 50% Injectable 12.5 Gram(s) IV Push once  dextrose 50% Injectable 25 Gram(s) IV Push once  dextrose 50% Injectable 25 Gram(s) IV Push once  DULoxetine 60 milliGRAM(s) Oral daily  influenza   Vaccine 0.5 milliLiter(s) IntraMuscular once  insulin glargine Injectable (LANTUS) 7 Unit(s) SubCutaneous every morning  insulin lispro (HumaLOG) corrective regimen sliding scale   SubCutaneous at bedtime  insulin lispro (HumaLOG) corrective regimen sliding scale   SubCutaneous three times a day before meals  metoprolol     tartrate 50 milliGRAM(s) Oral two times a day  Nephro-raphael 1 Tablet(s) Oral daily  sevelamer hydrochloride 800 milliGRAM(s) Oral three times a day with meals    MEDICATIONS  (PRN):  dextrose Gel 1 Dose(s) Oral once PRN Blood Glucose LESS THAN 70 milliGRAM(s)/deciliter  glucagon  Injectable 1 milliGRAM(s) IntraMuscular once PRN Glucose LESS THAN 70 milligrams/deciliter          PHYSICAL EXAM:  GENERAL: NAD, well-developed  HEAD:  Atraumatic, Normocephalic  EYES: EOMI, PERRLA, conjunctiva and sclera clear  NECK: Supple, No JVD  CHEST/LUNG: Clear to auscultation bilaterally; No wheeze  HEART: Regular rate and rhythm; No murmurs, rubs, or gallops  ABDOMEN: Soft, Nontender, Nondistended; Bowel sounds present  EXTREMITIES:  2+ Peripheral Pulses, No clubbing, cyanosis, or edema  PSYCH: AAOx3  NEUROLOGY: non-focal  SKIN: No rashes or lesions    LABS:                        9.7    3.0   )-----------( 166      ( 27 Nov 2017 15:25 )             29.0     11-27    130<L>  |  90<L>  |  36<H>  ----------------------------<  167<H>  5.3   |  22  |  7.42<H>    Ca    8.5      27 Nov 2017 07:30  Phos  4.8     11-26  Mg     2.2     11-27    TPro  5.8<L>  /  Alb  3.4  /  TBili  0.3  /  DBili  x   /  AST  88<H>  /  ALT  23  /  AlkPhos  205<H>  11-26    PT/INR - ( 26 Nov 2017 04:17 )   PT: 10.7 sec;   INR: 0.99 ratio         PTT - ( 27 Nov 2017 15:25 )  PTT:70.2 sec  CARDIAC MARKERS ( 27 Nov 2017 14:15 )  x     / 4.13 ng/mL / 97 U/L / x     / 5.5 ng/mL  CARDIAC MARKERS ( 26 Nov 2017 04:17 )  x     / 3.98 ng/mL / 254 U/L / x     / 19.5 ng/mL      < from: Transthoracic Echocardiogram (11.27.17 @ 11:14) >  Conclusions:  1. Mitral annular calcification. Mild-moderate mitral  regurgitation.  2. Moderately dilated left atrium.  LA volume index = 45  cc/m2.  3. Mild concentric left ventricular hypertrophy.  4. Mild to moderate segmental left ventricular systolic  dysfunction.  Hypokinesis of the inferior wall, severe  hypokinesis of the inferolateral wall.  5. Right ventricular enlargement with low normal right  ventricular systolic function.  6. Estimated pulmonary artery systolic pressure equals 52  mm Hg, assuming right atrial pressure equals 8 mm Hg,  consistent with moderate pulmonary pressures.  *** Compared with echocardiogram of 7/24/2017, there has  been a decline in left ventricular systolic function.    < end of copied text >      RADIOLOGY & ADDITIONAL TESTS:    Imaging Personally Reviewed:    Consultant(s) Notes Reviewed:      Care Discussed with Consultants/Other Providers:

## 2017-11-27 NOTE — PROGRESS NOTE ADULT - SUBJECTIVE AND OBJECTIVE BOX
Subjective: Patient seen and examined. No new events except as noted.   No furter cp feels ok now no sob no edema  MEDICATIONS:  MEDICATIONS  (STANDING):  aspirin enteric coated 81 milliGRAM(s) Oral daily  atorvastatin 20 milliGRAM(s) Oral at bedtime  clopidogrel Tablet 75 milliGRAM(s) Oral at bedtime  dextrose 5%. 1000 milliLiter(s) (50 mL/Hr) IV Continuous <Continuous>  dextrose 50% Injectable 12.5 Gram(s) IV Push once  dextrose 50% Injectable 25 Gram(s) IV Push once  dextrose 50% Injectable 25 Gram(s) IV Push once  DULoxetine 60 milliGRAM(s) Oral daily  heparin  Infusion. 1250 Unit(s)/Hr (12.5 mL/Hr) IV Continuous <Continuous>  influenza   Vaccine 0.5 milliLiter(s) IntraMuscular once  insulin glargine Injectable (LANTUS) 7 Unit(s) SubCutaneous every morning  insulin lispro (HumaLOG) corrective regimen sliding scale   SubCutaneous three times a day before meals  insulin lispro (HumaLOG) corrective regimen sliding scale   SubCutaneous at bedtime  metoprolol     tartrate 50 milliGRAM(s) Oral two times a day  Nephro-raphael 1 Tablet(s) Oral daily  sevelamer hydrochloride 800 milliGRAM(s) Oral three times a day with meals      PHYSICAL EXAM:  T(C): 36.7 (11-27-17 @ 04:11), Max: 36.7 (11-27-17 @ 04:11)  HR: 70 (11-27-17 @ 04:11) (67 - 77)  BP: 147/78 (11-27-17 @ 04:11) (109/48 - 170/86)  RR: 18 (11-27-17 @ 04:11) (16 - 22)  SpO2: 94% (11-27-17 @ 04:11) (91% - 96%)  Wt(kg): --  I&O's Summary    26 Nov 2017 07:01  -  27 Nov 2017 07:00  --------------------------------------------------------  IN: 1392 mL / OUT: 0 mL / NET: 1392 mL          Appearance: Normal NAD no further cp	  HEENT:   Normal oral mucosa, PERRL, EOMI	  Cardiovascular: Normal S1 S2, No JVD, No murmurs ,  Respiratory: Lungs clear to auscultation, normal effort 	  Gastrointestinal:  Soft, Non-tender, + BS	  Psychiatry:  Mood & affect appropriate  Ext: No edema      LABS:    CARDIAC MARKERS:  CARDIAC MARKERS ( 26 Nov 2017 04:17 )  x     / 3.98 ng/mL / 254 U/L / x     / 19.5 ng/mL  CARDIAC MARKERS ( 25 Nov 2017 06:31 )  x     / 2.09 ng/mL / 426 U/L / x     / 39.7 ng/mL  CARDIAC MARKERS ( 25 Nov 2017 01:47 )  x     / 0.64 ng/mL / 261 U/L / x     / 10.9 ng/mL  CARDIAC MARKERS ( 24 Nov 2017 21:56 )  x     / 0.66 ng/mL / 276 U/L / x     / 7.8 ng/mL                                9.4    3.50  )-----------( 177      ( 27 Nov 2017 07:30 )             29.1     11-27    130<L>  |  90<L>  |  36<H>  ----------------------------<  167<H>  5.3   |  22  |  7.42<H>    Ca    8.5      27 Nov 2017 07:30  Phos  4.8     11-26  Mg     2.2     11-27    TPro  5.8<L>  /  Alb  3.4  /  TBili  0.3  /  DBili  x   /  AST  88<H>  /  ALT  23  /  AlkPhos  205<H>  11-26    proBNP:   Lipid Profile:   HgA1c:   TSH:           TELEMETRY: 	    ECG:  	NSR ST depression inferolaterally that have resolved

## 2017-11-27 NOTE — PROVIDER CONTACT NOTE (OTHER) - REASON
Pt hypertensive after 100mg hydralazine Father  Still living? Unknown  Family history of early CAD, Age at diagnosis: Age Unknown

## 2017-11-27 NOTE — PROGRESS NOTE ADULT - SUBJECTIVE AND OBJECTIVE BOX
[facility]   [phone]  DIALYSIS NOTE  Chief Complaint: ESRD/Ongoing hemodialysis requirement.   24 hour events/subjective:      states beginning to feel better.       ALLERGIES & MEDICATIONS  --------------------------------------------------------------------------------  Allergies    No Known Allergies    Intolerances      Standing Inpatient Medications  aspirin enteric coated 81 milliGRAM(s) Oral daily  atorvastatin 20 milliGRAM(s) Oral at bedtime  clopidogrel Tablet 75 milliGRAM(s) Oral at bedtime  dextrose 5%. 1000 milliLiter(s) IV Continuous <Continuous>  dextrose 50% Injectable 12.5 Gram(s) IV Push once  dextrose 50% Injectable 25 Gram(s) IV Push once  dextrose 50% Injectable 25 Gram(s) IV Push once  DULoxetine 60 milliGRAM(s) Oral daily  heparin  Infusion. 1250 Unit(s)/Hr IV Continuous <Continuous>  influenza   Vaccine 0.5 milliLiter(s) IntraMuscular once  insulin glargine Injectable (LANTUS) 7 Unit(s) SubCutaneous every morning  insulin lispro (HumaLOG) corrective regimen sliding scale   SubCutaneous three times a day before meals  insulin lispro (HumaLOG) corrective regimen sliding scale   SubCutaneous at bedtime  metoprolol     tartrate 50 milliGRAM(s) Oral two times a day  Nephro-raphael 1 Tablet(s) Oral daily  sevelamer hydrochloride 800 milliGRAM(s) Oral three times a day with meals    PRN Inpatient Medications  dextrose Gel 1 Dose(s) Oral once PRN  glucagon  Injectable 1 milliGRAM(s) IntraMuscular once PRN  heparin  Injectable 3200 Unit(s) IV Push every 6 hours PRN      REVIEW OF SYSTEMS  --------------------------------------------------------------------------------  no itching or rash  no fever or chill  no cp or palp  at present   no sob or cough   no N/V/D/ no abd pain   ext no edema no pain         VITALS/PHYSICAL EXAM  --------------------------------------------------------------------------------  T(C): 36.7 (11-27-17 @ 04:11), Max: 36.7 (11-27-17 @ 04:11)  HR: 70 (11-27-17 @ 04:11) (67 - 77)  BP: 147/78 (11-27-17 @ 04:11) (109/48 - 170/86)  RR: 18 (11-27-17 @ 04:11) (16 - 22)  SpO2: 94% (11-27-17 @ 04:11) (91% - 96%)  Wt(kg): --        11-26-17 @ 07:01  -  11-27-17 @ 07:00  --------------------------------------------------------  IN: 1392 mL / OUT: 0 mL / NET: 1392 mL      Physical Exam:  		    	Gen: alert oriented  still somewhat lethargic  	Pulm: Decreased breath sounds b/l bases no rales or ronchi  	CV: RRR, S1/S2  	Abd: +BS, soft, nontender/nondistended  	Extremity: No cyanosis, no edema no clubbing  	LABS/STUDIES  --------------------------------------------------------------------------------              9.4    3.50  >-----------<  177      [11-27-17 @ 07:30]              29.1     130  |  90  |  36  ----------------------------<  167      [11-27-17 @ 07:30]  5.3   |  22  |  7.42        Ca     8.5     [11-27-17 @ 07:30]      Mg     2.2     [11-27-17 @ 07:30]      Phos  4.8     [11-26-17 @ 12:31]    TPro  5.8  /  Alb  3.4  /  TBili  0.3  /  DBili  x   /  AST  88  /  ALT  23  /  AlkPhos  205  [11-26-17 @ 04:17]    PT/INR: PT 10.7 , INR 0.99       [11-26-17 @ 04:17]  PTT: 129.7      [11-27-17 @ 07:13]    Troponin 3.98      [11-26-17 @ 04:17]        [11-26-17 @ 04:17]            imp/suggest: ESRD      Hemodialysis Prescription:  	Access: avf  	Dialyzer: revaclear 300  	Blood Flow (mL/Min): 400  	Dialysate Flow (mL/Min): 600  	Target UF (Liters):1.5 liter   	Treatment Time: 210M   	Potassium: 2k   	Calcium: 2.5  	  YOLANDA 33994 units

## 2017-11-27 NOTE — PROVIDER CONTACT NOTE (CRITICAL VALUE NOTIFICATION) - BACKGROUND
Pt on Heparin gtt at 14 ml/hour. Pt's previous ptt six hours prior was not yet therapeutic. Pt on personalized Heparin nomogram. Pt has left arm precautions for HD and ptt was drawn from same arm where Heparin is running, although far below site.

## 2017-11-27 NOTE — CHART NOTE - NSCHARTNOTEFT_GEN_A_CORE
Spoke with Dr. Barron ( Cardiology ). Ok to discontinue Heparin drip. Been treated with Heparin drip for 48 hours, not having chest pain. Pt will be seen today by Dr. Vela. Follow up with his recommendation for further management.    Kasey Joel NP-C  # [id number]

## 2017-11-28 LAB
ANION GAP SERPL CALC-SCNC: 18 MMOL/L — HIGH (ref 5–17)
APTT BLD: 25 SEC — LOW (ref 27.5–37.4)
BUN SERPL-MCNC: 16 MG/DL — SIGNIFICANT CHANGE UP (ref 7–23)
CALCIUM SERPL-MCNC: 8.3 MG/DL — LOW (ref 8.4–10.5)
CHLORIDE SERPL-SCNC: 90 MMOL/L — LOW (ref 96–108)
CO2 SERPL-SCNC: 26 MMOL/L — SIGNIFICANT CHANGE UP (ref 22–31)
CREAT SERPL-MCNC: 5 MG/DL — HIGH (ref 0.5–1.3)
GLUCOSE BLDC GLUCOMTR-MCNC: 138 MG/DL — HIGH (ref 70–99)
GLUCOSE BLDC GLUCOMTR-MCNC: 151 MG/DL — HIGH (ref 70–99)
GLUCOSE BLDC GLUCOMTR-MCNC: 249 MG/DL — HIGH (ref 70–99)
GLUCOSE BLDC GLUCOMTR-MCNC: 280 MG/DL — HIGH (ref 70–99)
GLUCOSE SERPL-MCNC: 189 MG/DL — HIGH (ref 70–99)
HCT VFR BLD CALC: 30.2 % — LOW (ref 34.5–45)
HGB BLD-MCNC: 9.7 G/DL — LOW (ref 11.5–15.5)
MCHC RBC-ENTMCNC: 32.1 GM/DL — SIGNIFICANT CHANGE UP (ref 32–36)
MCHC RBC-ENTMCNC: 33.2 PG — SIGNIFICANT CHANGE UP (ref 27–34)
MCV RBC AUTO: 103.4 FL — HIGH (ref 80–100)
PLATELET # BLD AUTO: 152 K/UL — SIGNIFICANT CHANGE UP (ref 150–400)
POTASSIUM SERPL-MCNC: 4.5 MMOL/L — SIGNIFICANT CHANGE UP (ref 3.5–5.3)
POTASSIUM SERPL-SCNC: 4.5 MMOL/L — SIGNIFICANT CHANGE UP (ref 3.5–5.3)
RBC # BLD: 2.92 M/UL — LOW (ref 3.8–5.2)
RBC # FLD: 13.5 % — SIGNIFICANT CHANGE UP (ref 10.3–14.5)
SODIUM SERPL-SCNC: 134 MMOL/L — LOW (ref 135–145)
WBC # BLD: 3.95 K/UL — SIGNIFICANT CHANGE UP (ref 3.8–10.5)
WBC # FLD AUTO: 3.95 K/UL — SIGNIFICANT CHANGE UP (ref 3.8–10.5)

## 2017-11-28 PROCEDURE — 12345: CPT | Mod: NC

## 2017-11-28 PROCEDURE — 93010 ELECTROCARDIOGRAM REPORT: CPT

## 2017-11-28 PROCEDURE — 99233 SBSQ HOSP IP/OBS HIGH 50: CPT

## 2017-11-28 RX ORDER — INSULIN LISPRO 100/ML
VIAL (ML) SUBCUTANEOUS AT BEDTIME
Qty: 0 | Refills: 0 | Status: COMPLETED | OUTPATIENT
Start: 2017-11-28 | End: 2017-11-28

## 2017-11-28 RX ORDER — INSULIN LISPRO 100/ML
VIAL (ML) SUBCUTANEOUS
Qty: 0 | Refills: 0 | Status: COMPLETED | OUTPATIENT
Start: 2017-11-28 | End: 2017-11-28

## 2017-11-28 RX ORDER — INSULIN LISPRO 100/ML
1 VIAL (ML) SUBCUTANEOUS
Qty: 0 | Refills: 0 | Status: DISCONTINUED | OUTPATIENT
Start: 2017-11-29 | End: 2017-11-29

## 2017-11-28 RX ORDER — OXYCODONE AND ACETAMINOPHEN 5; 325 MG/1; MG/1
1 TABLET ORAL ONCE
Qty: 0 | Refills: 0 | Status: DISCONTINUED | OUTPATIENT
Start: 2017-11-28 | End: 2017-11-28

## 2017-11-28 RX ORDER — ONDANSETRON 8 MG/1
4 TABLET, FILM COATED ORAL ONCE
Qty: 0 | Refills: 0 | Status: DISCONTINUED | OUTPATIENT
Start: 2017-11-28 | End: 2017-11-29

## 2017-11-28 RX ADMIN — ATORVASTATIN CALCIUM 20 MILLIGRAM(S): 80 TABLET, FILM COATED ORAL at 21:12

## 2017-11-28 RX ADMIN — Medication 1 TABLET(S): at 12:48

## 2017-11-28 RX ADMIN — CLOPIDOGREL BISULFATE 75 MILLIGRAM(S): 75 TABLET, FILM COATED ORAL at 18:57

## 2017-11-28 RX ADMIN — Medication 1: at 08:09

## 2017-11-28 RX ADMIN — Medication 81 MILLIGRAM(S): at 12:48

## 2017-11-28 RX ADMIN — Medication 50 MILLIGRAM(S): at 21:12

## 2017-11-28 RX ADMIN — SEVELAMER CARBONATE 800 MILLIGRAM(S): 2400 POWDER, FOR SUSPENSION ORAL at 21:12

## 2017-11-28 RX ADMIN — SEVELAMER CARBONATE 800 MILLIGRAM(S): 2400 POWDER, FOR SUSPENSION ORAL at 08:09

## 2017-11-28 RX ADMIN — DULOXETINE HYDROCHLORIDE 60 MILLIGRAM(S): 30 CAPSULE, DELAYED RELEASE ORAL at 12:48

## 2017-11-28 RX ADMIN — OXYCODONE AND ACETAMINOPHEN 1 TABLET(S): 5; 325 TABLET ORAL at 06:36

## 2017-11-28 RX ADMIN — Medication 2: at 13:39

## 2017-11-28 RX ADMIN — OXYCODONE AND ACETAMINOPHEN 1 TABLET(S): 5; 325 TABLET ORAL at 05:42

## 2017-11-28 RX ADMIN — SEVELAMER CARBONATE 800 MILLIGRAM(S): 2400 POWDER, FOR SUSPENSION ORAL at 12:48

## 2017-11-28 RX ADMIN — INSULIN GLARGINE 7 UNIT(S): 100 INJECTION, SOLUTION SUBCUTANEOUS at 08:10

## 2017-11-28 RX ADMIN — Medication 50 MILLIGRAM(S): at 05:00

## 2017-11-28 NOTE — PROGRESS NOTE ADULT - SUBJECTIVE AND OBJECTIVE BOX
Removal of Femoral Sheath    Pulses in the right lower extremity are palpable. The patient was placed in the supine position. The insertion site was identified and the sutures were removed per protocol.  The 6____ Martin[place] femoral sheath was then removed. Direct pressure was applied for  __20____ minutes.     Monitoring of the right groin and both lower extremities including neuro-vascular checks and vital signs every 15 minutes x 4, then every 30 minutes x 2, then every 1 hour was ordered.    Complications: None    Comments:Patient has previous fem-pop bypass and has firmness in both groins that was there prior to cath. Right groin is without hematoma  or bleeding.

## 2017-11-28 NOTE — PROGRESS NOTE ADULT - SUBJECTIVE AND OBJECTIVE BOX
Patient is a 62y old  Female who presents with a chief complaint of CC hyperkalemia, chest pain, DKA (24 Nov 2017 23:48)      SUBJECTIVE / OVERNIGHT EVENTS: Comfortable without new complaints.   Review of Systems  chest pain no  palpitations no  sob no  nausea no  headache no    MEDICATIONS  (STANDING):  aspirin enteric coated 81 milliGRAM(s) Oral daily  atorvastatin 20 milliGRAM(s) Oral at bedtime  clopidogrel Tablet 75 milliGRAM(s) Oral at bedtime  dextrose 5%. 1000 milliLiter(s) (50 mL/Hr) IV Continuous <Continuous>  dextrose 50% Injectable 12.5 Gram(s) IV Push once  dextrose 50% Injectable 25 Gram(s) IV Push once  dextrose 50% Injectable 25 Gram(s) IV Push once  DULoxetine 60 milliGRAM(s) Oral daily  influenza   Vaccine 0.5 milliLiter(s) IntraMuscular once  insulin lispro (HumaLOG) corrective regimen sliding scale   SubCutaneous at bedtime  insulin lispro (HumaLOG) corrective regimen sliding scale   SubCutaneous three times a day before meals  metoprolol     tartrate 50 milliGRAM(s) Oral two times a day  Nephro-raphael 1 Tablet(s) Oral daily  ondansetron Injectable 4 milliGRAM(s) IV Push once  sevelamer hydrochloride 800 milliGRAM(s) Oral three times a day with meals    MEDICATIONS  (PRN):  dextrose Gel 1 Dose(s) Oral once PRN Blood Glucose LESS THAN 70 milliGRAM(s)/deciliter  glucagon  Injectable 1 milliGRAM(s) IntraMuscular once PRN Glucose LESS THAN 70 milligrams/deciliter          PHYSICAL EXAM:  GENERAL: NAD  HEAD:  Atraumatic, Normocephalic  EYES: EOMI, PERRLA, conjunctiva and sclera clear  NECK: Supple, No JVD  CHEST/LUNG: Clear to auscultation bilaterally; No wheeze  HEART: Regular rate and rhythm; No murmurs, rubs, or gallops  ABDOMEN: Soft, Nontender, Nondistended; Bowel sounds present  EXTREMITIES:  2+ Peripheral Pulses, No clubbing, cyanosis, or edema  PSYCH: AAOx3  NEUROLOGY: non-focal  SKIN: No rashes or lesions    LABS:                        9.7    3.95  )-----------( 152      ( 28 Nov 2017 07:23 )             30.2     11-28    134<L>  |  90<L>  |  16  ----------------------------<  189<H>  4.5   |  26  |  5.00<H>    Ca    8.3<L>      28 Nov 2017 07:40  Mg     2.2     11-27      PTT - ( 27 Nov 2017 23:38 )  PTT:25.0 sec  CARDIAC MARKERS ( 27 Nov 2017 14:15 )  x     / 4.13 ng/mL / 97 U/L / x     / 5.5 ng/mL          RADIOLOGY & ADDITIONAL TESTS:    Imaging Personally Reviewed:    Consultant(s) Notes Reviewed:      Care Discussed with Consultants/Other Providers:

## 2017-11-28 NOTE — ADVANCED PRACTICE NURSE CONSULT - ASSESSMENT
63 y/o F w/h/o T1DM with multiple complications and comorbidities including ESRD>on HD and CAD s/p  stenting. Here with DKA/CP. Found to have non ST elevation MI follow by cardiology. Pt came in with DKA in setting of MI.  On heparin drip. Old infusion set removed from pt’s left abdomen and noted to have a blood filled cannula.  Insulin pump disconnected upon admission.  Pt has new insulin pump with her but has not been trained.   Old insulin pump settings transferred to new insulin pump and BG monitor synced to insulin pump as well.  In addition, pt has H/O of not changing her infusion sets q3 days so a reminder set to alarm for pt to change infusion set and reservoir q3days set for pt.   1 hour 30 minute visited completed and pt instructed on how to use insulin pump with adequate return demo observed.   However, insulin pump placed on "SUSPEND MODE" and not delivering insulin.  Pt already received Lantus 7 units this morning in addition to 1 unit correction bolus at 8:10am.  Insulin pump has to be resumed manually in order for pump to deliver insulin. Due to ESRD, Julianne Gibson, Endocrine, NP wants insulin pump resumed tomorrow at 8am with a temporary basal of 80% for 6 hours.   Insulin pump forms provided to pt by Primary RN for pt to complete.  Pt has 3 days of insulin pump supplies with her.   New insulin pump site on right abdomen and remains clean, dry, and intact.  Pt’s insulin pump settings as follows:   Basal  12am – 0.275 units/hour  5am – 0.375 units/hour  ICR  12am-12am – 1:15  ISF  12am – 60  7am – 40  6pm – 60  BGT  12am – 110-130 mg/dl  8am – 100-120 mg/dl  Insulin duration: 6 hours

## 2017-11-28 NOTE — PROGRESS NOTE ADULT - SUBJECTIVE AND OBJECTIVE BOX
DIABETES FOLLOW UP NOTE: Saw pt earlier today  INTERVAL HX: 63 y/o F w/h/o T1DM with multiple complications and comorbidities including ESRD>on HD and CAD s/p stenting. Here with DKA/CP. Found to have non ST elevation MI follow by cardiology. Off heparin drip.   Glycemic control not at goal with BG <100s yesterday during the day and  this am. Pt states not eating much due to nausea. Per RN she gave pt Cranberry juice due to fear of hypoglycemia since pt is not eating.  Inpatient CDE saw pt this am and provided pt with education regarding her new Minimed 630 G insulin pump.  She placed pump on pt but didn't start it since pt received Lantus this am and she is not eating will start pump tomorrow am.   Per team, pt going for card cath later today. Pt/staff instructed to disconnect pump when going for test.    Review of Systems:  Cardiovascular: No chest pain, palpitations  Respiratory: No SOB, no cough  GI: Positive nausea, denies vomiting, abdominal pain  Endocrine: no polyuria, polydipsia or S&Sx of hypoglycemia    Allergies    No Known Allergies      Intolerances      MEDICATIONS:  atorvastatin 20 milliGRAM(s) Oral at bedtime  insulin glargine Injectable (LANTUS) 7 Unit(s) SubCutaneous every morning  insulin lispro (HumaLOG) corrective regimen sliding scale   SubCutaneous three times a day before meals  insulin lispro (HumaLOG) corrective regimen sliding scale   SubCutaneous at bedtime      PHYSICAL EXAM:  Vital Signs Last 24 Hrs  T(C): 36.9 (11-28-17 @ 11:34), Max: 37 (11-27-17 @ 21:03)  T(F): 98.5 (11-28-17 @ 11:34), Max: 98.6 (11-27-17 @ 21:03)  HR: 63 (11-28-17 @ 11:34) (61 - 68)  BP: 136/71 (11-28-17 @ 11:34) (136/71 - 179/77)  BP(mean): --  RR: 18 (11-28-17 @ 11:34) (18 - 18)  SpO2: 96% (11-28-17 @ 11:34) (95% - 99%)  GENERAL: Female lying in bed in NAD  Abdomen: Soft, nontender, non distended  Extremities: Warm, no edema in all 4 extremities  NEURO: A&O X3    LABS  POCT Blood Glucose.: 280 mg/dL (11-28-17 @ 11:31)  POCT Blood Glucose.: 249 mg/dL (11-28-17 @ 07:51)  POCT Blood Glucose.: 86 mg/dL (11-27-17 @ 20:55)  POCT Blood Glucose.: 81 mg/dL (11-27-17 @ 18:22)  POCT Blood Glucose.: 67 mg/dL (11-27-17 @ 18:20)  POCT Blood Glucose.: 85 mg/dL (11-27-17 @ 17:05)  POCT Blood Glucose.: 89 mg/dL (11-27-17 @ 16:03)  POCT Blood Glucose.: 91 mg/dL (11-27-17 @ 15:02)  POCT Blood Glucose.: 87 mg/dL (11-27-17 @ 13:34)  POCT Blood Glucose.: 75 mg/dL (11-27-17 @ 11:53)  POCT Blood Glucose.: 198 mg/dL (11-27-17 @ 07:59)  POCT Blood Glucose.: 155 mg/dL (11-26-17 @ 21:37)  POCT Blood Glucose.: 372 mg/dL (11-26-17 @ 16:39)                          9.7    3.95  )-----------( 152      ( 28 Nov 2017 07:23 )             30.2       11-28    134<L>  |  90<L>  |  16  ----------------------------<  189<H>  4.5   |  26  |  5.00<H>    Ca    8.3<L>      28 Nov 2017 07:40  Mg     2.2     11-27    TPro  5.8<L>  /  Alb  3.4  /  TBili  0.3  /  DBili  x   /  AST  88<H>  /  ALT  23  /  AlkPhos  205<H>  11-26      Hemoglobin A1C, Whole Blood: 8.5 % <H> [4.0 - 5.6] (11-25-17 @ 04:12)

## 2017-11-29 ENCOUNTER — TRANSCRIPTION ENCOUNTER (OUTPATIENT)
Age: 62
End: 2017-11-29

## 2017-11-29 VITALS
DIASTOLIC BLOOD PRESSURE: 72 MMHG | OXYGEN SATURATION: 99 % | TEMPERATURE: 98 F | SYSTOLIC BLOOD PRESSURE: 177 MMHG | RESPIRATION RATE: 18 BRPM | HEART RATE: 61 BPM

## 2017-11-29 DIAGNOSIS — Z96.41 PRESENCE OF INSULIN PUMP (EXTERNAL) (INTERNAL): ICD-10-CM

## 2017-11-29 LAB
ANION GAP SERPL CALC-SCNC: 16 MMOL/L — SIGNIFICANT CHANGE UP (ref 5–17)
BUN SERPL-MCNC: 25 MG/DL — HIGH (ref 7–23)
CALCIUM SERPL-MCNC: 8.4 MG/DL — SIGNIFICANT CHANGE UP (ref 8.4–10.5)
CHLORIDE SERPL-SCNC: 88 MMOL/L — LOW (ref 96–108)
CO2 SERPL-SCNC: 25 MMOL/L — SIGNIFICANT CHANGE UP (ref 22–31)
CREAT SERPL-MCNC: 6.17 MG/DL — HIGH (ref 0.5–1.3)
GLUCOSE BLDC GLUCOMTR-MCNC: 106 MG/DL — HIGH (ref 70–99)
GLUCOSE BLDC GLUCOMTR-MCNC: 157 MG/DL — HIGH (ref 70–99)
GLUCOSE BLDC GLUCOMTR-MCNC: 179 MG/DL — HIGH (ref 70–99)
GLUCOSE SERPL-MCNC: 152 MG/DL — HIGH (ref 70–99)
HCT VFR BLD CALC: 29.7 % — LOW (ref 34.5–45)
HGB BLD-MCNC: 9.5 G/DL — LOW (ref 11.5–15.5)
MCHC RBC-ENTMCNC: 32 GM/DL — SIGNIFICANT CHANGE UP (ref 32–36)
MCHC RBC-ENTMCNC: 32.8 PG — SIGNIFICANT CHANGE UP (ref 27–34)
MCV RBC AUTO: 102.4 FL — HIGH (ref 80–100)
PLATELET # BLD AUTO: 158 K/UL — SIGNIFICANT CHANGE UP (ref 150–400)
POTASSIUM SERPL-MCNC: 4.8 MMOL/L — SIGNIFICANT CHANGE UP (ref 3.5–5.3)
POTASSIUM SERPL-SCNC: 4.8 MMOL/L — SIGNIFICANT CHANGE UP (ref 3.5–5.3)
RBC # BLD: 2.9 M/UL — LOW (ref 3.8–5.2)
RBC # FLD: 13.3 % — SIGNIFICANT CHANGE UP (ref 10.3–14.5)
SODIUM SERPL-SCNC: 129 MMOL/L — LOW (ref 135–145)
WBC # BLD: 4.97 K/UL — SIGNIFICANT CHANGE UP (ref 3.8–10.5)
WBC # FLD AUTO: 4.97 K/UL — SIGNIFICANT CHANGE UP (ref 3.8–10.5)

## 2017-11-29 PROCEDURE — 84132 ASSAY OF SERUM POTASSIUM: CPT

## 2017-11-29 PROCEDURE — 83690 ASSAY OF LIPASE: CPT

## 2017-11-29 PROCEDURE — C1769: CPT

## 2017-11-29 PROCEDURE — 93454 CORONARY ARTERY ANGIO S&I: CPT | Mod: 59

## 2017-11-29 PROCEDURE — 86706 HEP B SURFACE ANTIBODY: CPT

## 2017-11-29 PROCEDURE — 86803 HEPATITIS C AB TEST: CPT

## 2017-11-29 PROCEDURE — C1874: CPT

## 2017-11-29 PROCEDURE — 85730 THROMBOPLASTIN TIME PARTIAL: CPT

## 2017-11-29 PROCEDURE — 99285 EMERGENCY DEPT VISIT HI MDM: CPT | Mod: 25

## 2017-11-29 PROCEDURE — 80053 COMPREHEN METABOLIC PANEL: CPT

## 2017-11-29 PROCEDURE — 86709 HEPATITIS A IGM ANTIBODY: CPT

## 2017-11-29 PROCEDURE — 96376 TX/PRO/DX INJ SAME DRUG ADON: CPT

## 2017-11-29 PROCEDURE — 84484 ASSAY OF TROPONIN QUANT: CPT

## 2017-11-29 PROCEDURE — 87340 HEPATITIS B SURFACE AG IA: CPT

## 2017-11-29 PROCEDURE — 82962 GLUCOSE BLOOD TEST: CPT

## 2017-11-29 PROCEDURE — 83735 ASSAY OF MAGNESIUM: CPT

## 2017-11-29 PROCEDURE — 93306 TTE W/DOPPLER COMPLETE: CPT

## 2017-11-29 PROCEDURE — 99261: CPT

## 2017-11-29 PROCEDURE — 82947 ASSAY GLUCOSE BLOOD QUANT: CPT

## 2017-11-29 PROCEDURE — 71045 X-RAY EXAM CHEST 1 VIEW: CPT

## 2017-11-29 PROCEDURE — C1725: CPT

## 2017-11-29 PROCEDURE — 93005 ELECTROCARDIOGRAM TRACING: CPT

## 2017-11-29 PROCEDURE — 93010 ELECTROCARDIOGRAM REPORT: CPT

## 2017-11-29 PROCEDURE — 82435 ASSAY OF BLOOD CHLORIDE: CPT

## 2017-11-29 PROCEDURE — 85027 COMPLETE CBC AUTOMATED: CPT

## 2017-11-29 PROCEDURE — 99153 MOD SED SAME PHYS/QHP EA: CPT

## 2017-11-29 PROCEDURE — 87040 BLOOD CULTURE FOR BACTERIA: CPT

## 2017-11-29 PROCEDURE — 12345: CPT | Mod: NC

## 2017-11-29 PROCEDURE — 82550 ASSAY OF CK (CPK): CPT

## 2017-11-29 PROCEDURE — C9600: CPT | Mod: RC

## 2017-11-29 PROCEDURE — 80048 BASIC METABOLIC PNL TOTAL CA: CPT

## 2017-11-29 PROCEDURE — 82803 BLOOD GASES ANY COMBINATION: CPT

## 2017-11-29 PROCEDURE — 86705 HEP B CORE ANTIBODY IGM: CPT

## 2017-11-29 PROCEDURE — 99152 MOD SED SAME PHYS/QHP 5/>YRS: CPT

## 2017-11-29 PROCEDURE — 82553 CREATINE MB FRACTION: CPT

## 2017-11-29 PROCEDURE — 83605 ASSAY OF LACTIC ACID: CPT

## 2017-11-29 PROCEDURE — 97161 PT EVAL LOW COMPLEX 20 MIN: CPT

## 2017-11-29 PROCEDURE — 96375 TX/PRO/DX INJ NEW DRUG ADDON: CPT

## 2017-11-29 PROCEDURE — 96374 THER/PROPH/DIAG INJ IV PUSH: CPT

## 2017-11-29 PROCEDURE — 85610 PROTHROMBIN TIME: CPT

## 2017-11-29 PROCEDURE — 86704 HEP B CORE ANTIBODY TOTAL: CPT

## 2017-11-29 PROCEDURE — 83036 HEMOGLOBIN GLYCOSYLATED A1C: CPT

## 2017-11-29 PROCEDURE — 82010 KETONE BODYS QUAN: CPT

## 2017-11-29 PROCEDURE — 82565 ASSAY OF CREATININE: CPT

## 2017-11-29 PROCEDURE — 82330 ASSAY OF CALCIUM: CPT

## 2017-11-29 PROCEDURE — 94640 AIRWAY INHALATION TREATMENT: CPT

## 2017-11-29 PROCEDURE — C1894: CPT

## 2017-11-29 PROCEDURE — 84100 ASSAY OF PHOSPHORUS: CPT

## 2017-11-29 PROCEDURE — C1887: CPT

## 2017-11-29 PROCEDURE — 84295 ASSAY OF SERUM SODIUM: CPT

## 2017-11-29 PROCEDURE — 99233 SBSQ HOSP IP/OBS HIGH 50: CPT | Mod: GC

## 2017-11-29 PROCEDURE — 82009 KETONE BODYS QUAL: CPT

## 2017-11-29 PROCEDURE — 85014 HEMATOCRIT: CPT

## 2017-11-29 RX ORDER — INSULIN LISPRO 100/ML
1 VIAL (ML) SUBCUTANEOUS
Qty: 0 | Refills: 0 | COMMUNITY
Start: 2017-11-29

## 2017-11-29 RX ADMIN — SEVELAMER CARBONATE 800 MILLIGRAM(S): 2400 POWDER, FOR SUSPENSION ORAL at 17:10

## 2017-11-29 RX ADMIN — Medication 50 MILLIGRAM(S): at 05:12

## 2017-11-29 RX ADMIN — SEVELAMER CARBONATE 800 MILLIGRAM(S): 2400 POWDER, FOR SUSPENSION ORAL at 08:07

## 2017-11-29 RX ADMIN — DULOXETINE HYDROCHLORIDE 60 MILLIGRAM(S): 30 CAPSULE, DELAYED RELEASE ORAL at 17:10

## 2017-11-29 RX ADMIN — Medication 50 MILLIGRAM(S): at 17:11

## 2017-11-29 RX ADMIN — Medication 1 TABLET(S): at 17:10

## 2017-11-29 RX ADMIN — SEVELAMER CARBONATE 800 MILLIGRAM(S): 2400 POWDER, FOR SUSPENSION ORAL at 12:55

## 2017-11-29 RX ADMIN — Medication 81 MILLIGRAM(S): at 17:10

## 2017-11-29 NOTE — PROGRESS NOTE ADULT - SUBJECTIVE AND OBJECTIVE BOX
DIABETES FOLLOW UP NOTE: Saw pt earlier today  INTERVAL HX: 61 y/o F w/h/o T1DM with multiple complications and comorbidities including ESRD>on HD and CAD s/p stenting. Here with DKA/CP. Found to have non ST elevation MI follow by cardiology. Off heparin drip. S/p card cath with distal RCA stent. Possible discharge home today.  Glycemic control improved. Started insulin pump at 7:45am this am with BG at goal. Received further DM education about carb count and use of new insulin pump by CDE.     Review of Systems:  Cardiovascular: No chest pain, palpitations  Respiratory: No SOB, no cough  GI: Positive nausea, denies vomiting, abdominal pain  Endocrine: no polyuria, polydipsia or S&Sx of hypoglycemia    Allergies    No Known Allergies      Intolerances      MEDICATIONS:  atorvastatin 20 milliGRAM(s) Oral at bedtime  insulin lispro (HumaLOG) Pump 1 Each SubCutaneous Continuous Pump.  Pt with 80% basal until 12noon >changed to 100% at that time with following pump settings:  Basal  12am – 0.275 units/hour  5am – 0.375 units/hour  ICR  12am-12am – 1:15  ISF  12am – 60  7am – 40  6pm – 60  BGT  12am – 110-130 mg/dl  8am – 100-120 mg/dl  Insulin duration: 6 hours        PHYSICAL EXAM:  Vital Signs Last 24 Hrs  T(C): 36.8 (11-29-17 @ 11:09), Max: 37.4 (11-28-17 @ 23:34)  T(F): 98.2 (11-29-17 @ 11:09), Max: 99.3 (11-28-17 @ 23:34)  HR: 60 (11-29-17 @ 11:09) (60 - 68)  BP: 150/71 (11-29-17 @ 11:09) (145/70 - 182/72)  BP(mean): --  RR: 18 (11-29-17 @ 11:09) (18 - 18)  SpO2: 97% (11-29-17 @ 11:09) (95% - 98%)  GENERAL: Female lying in bed in NAD  Abdomen: Soft, nontender, non distended. Insulin pump site  intact in L flank (site changed today)  Extremities: Warm, no edema in all 4 extremities  NEURO: A&O X3    LABS  POCT Blood Glucose.: 157 mg/dL (11-29-17 @ 11:07)  POCT Blood Glucose.: 179 mg/dL (11-29-17 @ 07:38)  POCT Blood Glucose.: 151 mg/dL (11-28-17 @ 21:19)  POCT Blood Glucose.: 138 mg/dL (11-28-17 @ 18:12)  POCT Blood Glucose.: 280 mg/dL (11-28-17 @ 11:31)  POCT Blood Glucose.: 249 mg/dL (11-28-17 @ 07:51)  POCT Blood Glucose.: 86 mg/dL (11-27-17 @ 20:55)  POCT Blood Glucose.: 81 mg/dL (11-27-17 @ 18:22)  POCT Blood Glucose.: 67 mg/dL (11-27-17 @ 18:20)  POCT Blood Glucose.: 85 mg/dL (11-27-17 @ 17:05)  POCT Blood Glucose.: 89 mg/dL (11-27-17 @ 16:03)  POCT Blood Glucose.: 91 mg/dL (11-27-17 @ 15:02)                          9.5    4.97  )-----------( 158      ( 29 Nov 2017 08:32 )             29.7     11-29    129<L>  |  88<L>  |  25<H>  ----------------------------<  152<H>  4.8   |  25  |  6.17<H>    Ca    8.4      29 Nov 2017 08:32      Hemoglobin A1C, Whole Blood: 8.5 % <H> [4.0 - 5.6] (11-25-17 @ 04:12)

## 2017-11-29 NOTE — DISCHARGE NOTE ADULT - PLAN OF CARE
HgA1C this admission.  Make sure you get your HgA1c checked every three months.  If you take oral diabetes medications, check your blood glucose two times a day.  If you take insulin, check your blood glucose before meals and at bedtime.  It's important not to skip any meals.  Keep a log of your blood glucose results and always take it with you to your doctor appointments.  Keep a list of your current medications including injectables and over the counter medications and bring this medication list with you to all your doctor appointments.  If you have not seen your ophthalmologist this year call for appointment.  Check your feet daily for redness, sores, or openings. Do not self treat. If no improvement in two days call your primary care physician for an appointment.  Low blood sugar (hypoglycemia) is a blood sugar below 70mg/dl. Check your blood sugar if you feel signs/symptoms of hypoglycemia. If your blood sugar is below 70 take 15 grams of carbohydrates (ex 4 oz of apple juice, 3-4 glucose tablets, or 4-6 oz of regular soda) wait 15 minutes and repeat blood sugar to make sure it comes up above 70.  If your blood sugar is above 70 and you are due for a meal, have a meal.  If you are not due for a meal have a snack.  This snack helps keeps your blood sugar at a safe range. Resolution of symptom Continue with current regimen Coronary artery disease is a condition where the arteries the supply the heart muscle get clogges with fatty deposits & puts you at risk for a heart attack  Call your doctor if you have any new pain, pressure, or discomfort in the center of your chest, pain, tingling or discomfort in arms, back, neck, jaw, or stomach, shortness of breath, nausea, vomiting, burping or heartburn, sweating, cold and clammy skin, racing or abnormal heartbeat for more than 10 minutes or if they keep coming & going.  Call 911 and do not tr to get to hospital by care  You can help yourself with lefestyle changes (quitting smoking if you smoke), eat lots of fruits & vegetables & low fat dairy products, not a lot of meat & fatty foods, walk or some form of physical activity most days of the week, lose weight if you are overweight  Take your cardiac medication as prescribed to lower cholesterol, to lower blood pressure, aspirin to prevent blood clots, and diabetes control  Make sure to keep appointments with doctor for cardiac follow up care Avoid taking (NSAIDs) - (ex: Ibuprofen, Advil, Celebrex, Naprosyn)  Avoid taking any nephrotoxic agents (can harm kidneys) - Intravenous contrast for diagnostic testing, combination cold medications.  Have all medications adjusted for your renal function by your Health Care Provider.  Blood pressure control is important.  Take all medication as prescribed.

## 2017-11-29 NOTE — PROGRESS NOTE ADULT - SUBJECTIVE AND OBJECTIVE BOX
Patient is a 62y old  Female who presents with a chief complaint of CC hyperkalemia, chest pain, DKA (29 Nov 2017 12:32)      SUBJECTIVE / OVERNIGHT EVENTS: Comfortable without new complaints.   Review of Systems  chest pain no  palpitations no  sob no  nausea no  headache no    MEDICATIONS  (STANDING):  aspirin enteric coated 81 milliGRAM(s) Oral daily  atorvastatin 20 milliGRAM(s) Oral at bedtime  clopidogrel Tablet 75 milliGRAM(s) Oral at bedtime  dextrose 5%. 1000 milliLiter(s) (50 mL/Hr) IV Continuous <Continuous>  dextrose 50% Injectable 12.5 Gram(s) IV Push once  dextrose 50% Injectable 25 Gram(s) IV Push once  dextrose 50% Injectable 25 Gram(s) IV Push once  DULoxetine 60 milliGRAM(s) Oral daily  influenza   Vaccine 0.5 milliLiter(s) IntraMuscular once  insulin lispro (HumaLOG) Pump 1 Each SubCutaneous Continuous Pump  metoprolol     tartrate 50 milliGRAM(s) Oral two times a day  Nephro-raphael 1 Tablet(s) Oral daily  ondansetron Injectable 4 milliGRAM(s) IV Push once  sevelamer hydrochloride 800 milliGRAM(s) Oral three times a day with meals    MEDICATIONS  (PRN):  dextrose Gel 1 Dose(s) Oral once PRN Blood Glucose LESS THAN 70 milliGRAM(s)/deciliter  glucagon  Injectable 1 milliGRAM(s) IntraMuscular once PRN Glucose LESS THAN 70 milligrams/deciliter          PHYSICAL EXAM:  GENERAL: NAD, well-developed  HEAD:  Atraumatic, Normocephalic  EYES: EOMI, PERRLA, conjunctiva and sclera clear  NECK: Supple, No JVD  CHEST/LUNG: Clear to auscultation bilaterally; No wheeze  HEART: Regular rate and rhythm; No murmurs, rubs, or gallops  ABDOMEN: Soft, Nontender, Nondistended; Bowel sounds present  EXTREMITIES:  2+ Peripheral Pulses, No clubbing, cyanosis, or edema  PSYCH: AAOx3  NEUROLOGY: non-focal  SKIN: No rashes or lesions    LABS:                        9.5    4.97  )-----------( 158      ( 29 Nov 2017 08:32 )             29.7     11-29    129<L>  |  88<L>  |  25<H>  ----------------------------<  152<H>  4.8   |  25  |  6.17<H>    Ca    8.4      29 Nov 2017 08:32      PTT - ( 27 Nov 2017 23:38 )  PTT:25.0 sec          RADIOLOGY & ADDITIONAL TESTS:    Imaging Personally Reviewed:    Consultant(s) Notes Reviewed:      Care Discussed with Consultants/Other Providers:

## 2017-11-29 NOTE — PROGRESS NOTE ADULT - SUBJECTIVE AND OBJECTIVE BOX
Subjective: Patient seen and examined. No new events except as noted.   s/p cath severe distal RCA disease PTCI of distal RCA successful  ambulating this AM no sp or sob feels well  right groin OK no hematoma  MEDICATIONS:  MEDICATIONS  (STANDING):  aspirin enteric coated 81 milliGRAM(s) Oral daily  atorvastatin 20 milliGRAM(s) Oral at bedtime  clopidogrel Tablet 75 milliGRAM(s) Oral at bedtime  dextrose 5%. 1000 milliLiter(s) (50 mL/Hr) IV Continuous <Continuous>  dextrose 50% Injectable 12.5 Gram(s) IV Push once  dextrose 50% Injectable 25 Gram(s) IV Push once  dextrose 50% Injectable 25 Gram(s) IV Push once  DULoxetine 60 milliGRAM(s) Oral daily  influenza   Vaccine 0.5 milliLiter(s) IntraMuscular once  insulin lispro (HumaLOG) Pump 1 Each SubCutaneous Continuous Pump  metoprolol     tartrate 50 milliGRAM(s) Oral two times a day  Nephro-raphael 1 Tablet(s) Oral daily  ondansetron Injectable 4 milliGRAM(s) IV Push once  sevelamer hydrochloride 800 milliGRAM(s) Oral three times a day with meals      PHYSICAL EXAM:  T(C): 36.7 (11-29-17 @ 04:22), Max: 37.4 (11-28-17 @ 23:34)  HR: 61 (11-29-17 @ 04:22) (61 - 65)  BP: 169/71 (11-29-17 @ 04:22) (136/71 - 182/72)  RR: 18 (11-29-17 @ 04:22) (18 - 18)  SpO2: 96% (11-29-17 @ 04:22) (95% - 98%)  Wt(kg): --  I&O's Summary    28 Nov 2017 07:01  -  29 Nov 2017 07:00  --------------------------------------------------------  IN: 360 mL / OUT: 0 mL / NET: 360 mL    29 Nov 2017 07:01  -  29 Nov 2017 09:54  --------------------------------------------------------  IN: 360 mL / OUT: 0 mL / NET: 360 mL          Appearance: Normal	looks well  HEENT:   Normal oral mucosa, PERRL, EOMI	  Cardiovascular: Normal S1 S2, No JVD, No murmurs ,  Respiratory: Lungs clear to auscultation, normal effort 	  Ext: No edema right groin no hematoma        LABS:    CARDIAC MARKERS:  CARDIAC MARKERS ( 27 Nov 2017 14:15 )  x     / 4.13 ng/mL / 97 U/L / x     / 5.5 ng/mL                                9.5    4.97  )-----------( 158      ( 29 Nov 2017 08:32 )             29.7     11-29    129<L>  |  88<L>  |  25<H>  ----------------------------<  152<H>  4.8   |  25  |  6.17<H>    Ca    8.4      29 Nov 2017 08:32      proBNP:   Lipid Profile:   HgA1c:   TSH:           TELEMETRY: 	    ECG:  	  RADIOLOGY:   DIAGNOSTIC TESTING:  [ ] Echocardiogram:  [ ]  Catheterization:  [ ] Stress Test:    OTHER:

## 2017-11-29 NOTE — DISCHARGE NOTE ADULT - SECONDARY DIAGNOSIS.
Type 1 diabetes mellitus with ketoacidosis without coma CAD (coronary artery disease) ESRD (end stage renal disease)

## 2017-11-29 NOTE — PROGRESS NOTE ADULT - PROBLEM SELECTOR PLAN 1
-Test BG ac and hs  -Continue insulin pump use at present insulin pump settings.  -If BG <100 prior discharge this pm contact DM team. Spoke to NP in charge of pt.  -Plan discussed with pt/team.  Contact info: [phone] (24/7). pager [phone]
-Test BG ac and hs  -Discontinue Lantus 7 units q am. Will restart insulin pump tomorrow am at 80% basal doses (see above for settings)  -Continue Humalog scale ac for BG >200s instead of >150 for now. Will discontinue all insulin orders for tomorrow and  enter insulin pump order set..  -Will continue to hold on premeal insulin until PO improves.  -Pt /staff aware of disconnecting insulin pump for cardiac cath this pm.  -Plan discussed with pt/team.  Contact info: [phone] (24/7). pager [phone]
-Test BG ac and hs  -Continue Lantus 7 units q am   -Change Humalog scale ac to start for BG >200s instead of > 150.  -Will hold on premeal insulin until PO improves.  -Will have CDE see pt tomorrow to educate pt about new insulin pump.  -Plan discussed with pt/team.  Contact info: [phone] (24/7). pager [phone]

## 2017-11-29 NOTE — DISCHARGE NOTE ADULT - HOSPITAL COURSE
62 year old female with PMHx T1DM on insulin pump, HTN, HLD, former smoker, MI, CAD s/p PCI to RCA and brachytherapy in Aug 2017, dCHF (last TTE- July 2017- mild MR, mild AS, mild-mod TR EF65%) chronic LLE pain and edema in setting of severe PVD s/p L & R fem-pop bypass  graft,  multiple vascular surgery both leg w/ fem-pop bypass revisions , Subclavian vein stenosis left s/p stent, ESRD on HD (Tu/Thr/Sat) via L AVF with oliguria, CVA with memory deficit, depression, chronic pain management for LLE on oxycodone who presents with chest pain and malaise found to have K of 7, and in DKA.  Will restart insulin pump today at 8am as follows:  Basal  12am – 0.275 units/hour  5am – 0.375 units/hour  ICR  12am-12am – 1:15  ISF  12am – 60  7am – 40  6pm – 60  BGT  12am – 110-130 mg/dl  8am – 100-120 mg/dl  Insulin duration: 6 hours         DX: Type 1 diabetes mellitus with ketoacidosis without coma.  Test BG ac and hs. Discontinue Lantus 7 units q am.  Insulin pump restart  today at 80% basal doses (see above for settings). Continue Humalog scale ac for BG >200s instead of >150 for now. Will continue to hold on pre meal insulin until PO improves. Call this number for appointment  (24/7).   ESRD: Dialysis today prior to discharge.

## 2017-11-29 NOTE — PROGRESS NOTE ADULT - PROBLEM SELECTOR PLAN 2
-Pt due to change insulin pump site 12/2/17  -Pt states she will make apt with her endocrinologist upon discharge.

## 2017-11-29 NOTE — DISCHARGE NOTE ADULT - MEDICATION SUMMARY - MEDICATIONS TO STOP TAKING
I will STOP taking the medications listed below when I get home from the hospital:    Percocet 5/325 oral tablet  -- 1 tab(s) by mouth once a day (at bedtime)    lisinopril 40 mg oral tablet  -- 1 tab(s) by mouth once a day (at bedtime)

## 2017-11-29 NOTE — DISCHARGE NOTE ADULT - MEDICATION SUMMARY - MEDICATIONS TO TAKE
I will START or STAY ON the medications listed below when I get home from the hospital:    aspirin 81 mg oral delayed release tablet  -- 1 tab(s) by mouth once a day  -- Indication: For Prophylactic measure    DULoxetine 60 mg oral delayed release capsule  -- 1 cap(s) by mouth once a day  -- Indication: For Deprssion    insulin lispro 100 units/mL subcutaneous solution  -- 1 each subcutaneous   -- Indication: For Diabetes mellitus of other type with ketoacidosis without coma    atorvastatin 20 mg oral tablet  -- 1 tab(s) by mouth once a day (at bedtime)  -- Indication: For Hyperlipidemia, unspecified hyperlipidemia type    clopidogrel 75 mg oral tablet  -- 1 tab(s) by mouth once a day (at bedtime)  -- Indication: For Prophylactic measure    metoprolol tartrate 50 mg oral tablet  -- 1 tab(s) by mouth 2 times a day  -- Indication: For Hypertension, unspecified type    sevelamer hydrochloride 800 mg oral tablet  -- 3 tab(s) by mouth 3 times a day (with meals)  -- Indication: For ESRD (end stage renal disease)    Nephro-Jenny oral tablet  -- 1 tab(s) by mouth once a day   -- Indication: For Supplement

## 2017-11-29 NOTE — DISCHARGE NOTE ADULT - PATIENT PORTAL LINK FT
“You can access the [employer] Patient Portal, offered by [facility], by registering with the following website: [email or web address]”

## 2017-11-29 NOTE — PHYSICAL THERAPY INITIAL EVALUATION ADULT - PERTINENT HX OF CURRENT PROBLEM, REHAB EVAL
62 year old female with PMHx T1DM on insulin pump, HTN, HLD, former smoker, MI, CAD s/p PCI to RCA and brachytherapy in Aug 2017, dCHF (last TTE- July 2017- mild MR, mild AS, mild-mod TR EF65%) chronic LLE pain and edema in setting of severe PVD s/p L

## 2017-11-29 NOTE — DISCHARGE NOTE ADULT - CARE PROVIDER_API CALL
Julianne Gibson (NP; RN), NP in Adult Health  [address] [place] [address]  Phone: [phone]  Fax: [phone]

## 2017-11-29 NOTE — ADVANCED PRACTICE NURSE CONSULT - RECOMMEDATIONS
Insulin pump placed on temporary basal of 80% for 6 hours and 15 minutes.  If pt has any problems prior to next meal/correction bolus, please page [phone].

## 2017-11-29 NOTE — DISCHARGE NOTE ADULT - CARE PLAN
Principal Discharge DX:	DKA (diabetic ketoacidoses)  Goal:	Resolution of symptom  Instructions for follow-up, activity and diet:	Continue with current regimen  Secondary Diagnosis:	CAD (coronary artery disease)  Instructions for follow-up, activity and diet:	Coronary artery disease is a condition where the arteries the supply the heart muscle get clogges with fatty deposits & puts you at risk for a heart attack  Call your doctor if you have any new pain, pressure, or discomfort in the center of your chest, pain, tingling or discomfort in arms, back, neck, jaw, or stomach, shortness of breath, nausea, vomiting, burping or heartburn, sweating, cold and clammy skin, racing or abnormal heartbeat for more than 10 minutes or if they keep coming & going.  Call 911 and do not tr to get to hospital by care  You can help yourself with lefestyle changes (quitting smoking if you smoke), eat lots of fruits & vegetables & low fat dairy products, not a lot of meat & fatty foods, walk or some form of physical activity most days of the week, lose weight if you are overweight  Take your cardiac medication as prescribed to lower cholesterol, to lower blood pressure, aspirin to prevent blood clots, and diabetes control  Make sure to keep appointments with doctor for cardiac follow up care  Secondary Diagnosis:	ESRD (end stage renal disease)  Instructions for follow-up, activity and diet:	Avoid taking (NSAIDs) - (ex: Ibuprofen, Advil, Celebrex, Naprosyn)  Avoid taking any nephrotoxic agents (can harm kidneys) - Intravenous contrast for diagnostic testing, combination cold medications.  Have all medications adjusted for your renal function by your Health Care Provider.  Blood pressure control is important.  Take all medication as prescribed.  Secondary Diagnosis:	Type 1 diabetes mellitus with ketoacidosis without coma  Instructions for follow-up, activity and diet:	HgA1C this admission.  Make sure you get your HgA1c checked every three months.  If you take oral diabetes medications, check your blood glucose two times a day.  If you take insulin, check your blood glucose before meals and at bedtime.  It's important not to skip any meals.  Keep a log of your blood glucose results and always take it with you to your doctor appointments.  Keep a list of your current medications including injectables and over the counter medications and bring this medication list with you to all your doctor appointments.  If you have not seen your ophthalmologist this year call for appointment.  Check your feet daily for redness, sores, or openings. Do not self treat. If no improvement in two days call your primary care physician for an appointment.  Low blood sugar (hypoglycemia) is a blood sugar below 70mg/dl. Check your blood sugar if you feel signs/symptoms of hypoglycemia. If your blood sugar is below 70 take 15 grams of carbohydrates (ex 4 oz of apple juice, 3-4 glucose tablets, or 4-6 oz of regular soda) wait 15 minutes and repeat blood sugar to make sure it comes up above 70.  If your blood sugar is above 70 and you are due for a meal, have a meal.  If you are not due for a meal have a snack.  This snack helps keeps your blood sugar at a safe range.

## 2017-11-29 NOTE — PROGRESS NOTE ADULT - PROBLEM SELECTOR PROBLEM 1
Type 1 diabetes mellitus with ketoacidosis without coma

## 2017-11-29 NOTE — PROGRESS NOTE ADULT - ASSESSMENT
1. S/p successful PTCI of distal RCA  2. no angina or overt CHF  3. DM  4. PVD  5. ESRD    Recommend  dialyz today  discharge planning as pr medicine  asa and Plavix along with blood pressure control
61 y/o F w/h/o T1DM with multiple complications and comorbidities including ESRD>on HD and CAD s/p stenting. Here with DKA/CP. Found to have non ST elevation MI follow by cardiology. Off heparin drip (Plavix and ASA). S/p car cath with stenting. Going home this pm. Glycemic control improved  with improved PO intake as well. BGs at goal today so set up pump at 100%. No hypoglycemia and stable wbc.     Pt has contact info of [employer] in case of any issues with insulin pump.
1. multiple medical problems DM PVD CAD ESRD  2. recurrent restenosis of RCA occluded CX previous normal LV function S/P brachytherapy and multiple RCA intervesntions  3. non ST elevation MI with classic cp and ecg changes and positive enzymes    Recommend  dialze today  2 D echo to be done today  cath on 11/28/17 patient agreeable
62 year old female with PMHx T1DM on insulin pump, HTN, HLD, former smoker, MI, CAD s/p PCI to RCA and brachytherapy in Aug 2017, dCHF (last TTE- July 2017- mild MR, mild AS, mild-mod TR EF65%) chronic LLE pain and edema in setting of severe PVD s/p L & R fem-pop bypass  graft,  multiple vascular surgery both leg w/ fem-pop bypass revisions , Subclavian vein stenosis left s/p stent, ESRD on HD (Tu/Thr/Sat) via L AVF with oliguria, CVA with memory deficit, depression, chronic pain management for LLE on oxycodone who presents with chest pain and malaise found to have K of 7, and in DKA.      CV:   - BP currently stable.  - Off heparin gtt, asa, plavix for NSTEMI management.  cardiology follow/ for cath. pending         Pulm: Sating well on RA. No acute issues.  - PET from 10/17 with ground glass opacity RUL. Will need further w/u .    Renal: Patient s/p urgent dialysis yesterday. Euvolemic on exam today.  - Continue to monitor electrolytes. Hyper K currently resolved along with EKG changes.  - HD/ Nephrology follow    DKA resolved . Gap closed. Urine ketone negative.  - Started lantus 7u this am. Will turn off gtt insulin 1h after.  - renal diet.  - Continue to monitor FS goal 100-180 and anion gap at this time.  - Endocrine follow  Pierce Viera MD pager [phone]
62 year old female with PMHx T1DM on insulin pump, HTN, HLD, former smoker, MI, CAD s/p PCI to RCA and brachytherapy in Aug 2017, dCHF (last TTE- July 2017- mild MR, mild AS, mild-mod TR EF65%) chronic LLE pain and edema in setting of severe PVD s/p L & R fem-pop bypass  graft,  multiple vascular surgery both leg w/ fem-pop bypass revisions , Subclavian vein stenosis left s/p stent, ESRD on HD (Tu/Thr/Sat) via L AVF with oliguria, CVA with memory deficit, depression, chronic pain management for LLE on oxycodone who presents with chest pain and malaise found to have K of 7, and in DKA.      CV:   - BP currently stable.  - Off heparin gtt, asa, plavix for NSTEMI management.  cardiology follow/ possible cath.        Pulm: Sating well on RA. No acute issues.  - PET from 10/17 with ground glass opacity RUL. Will need further w/u .    Renal: Patient s/p urgent dialysis yesterday. Euvolemic on exam today.  - Continue to monitor electrolytes. Hyper K currently resolved along with EKG changes.  - HD/ Nephrology follow    DKA resolved . Gap closed. Urine ketone negative.  - Started lantus 7u this am. Will turn off gtt insulin 1h after.  - renal diet.  - Continue to monitor FS goal 100-180 and anion gap at this time.  - Endocrine follow  Pierce Viera MD pager [phone]
62 year old female with PMHx T1DM on insulin pump, HTN, HLD, former smoker, MI, CAD s/p PCI to RCA and brachytherapy in Aug 2017, dCHF (last TTE- July 2017- mild MR, mild AS, mild-mod TR EF65%) chronic LLE pain and edema in setting of severe PVD s/p L & R fem-pop bypass  graft,  multiple vascular surgery both leg w/ fem-pop bypass revisions , Subclavian vein stenosis left s/p stent, ESRD on HD (Tu/Thr/Sat) via L AVF with oliguria, CVA with memory deficit, depression, chronic pain management for LLE on oxycodone who presents with chest pain and malaise found to have K of 7, and in DKA.      CV:   - BP currently stable.  - Off heparin gtt, asa, plavix for NSTEMI management.  cardiology follow/ s/p cath and stent        Pulm: Sating well on RA. No acute issues.  - PET from 10/17 with ground glass opacity RUL. Will need further w/u .    Renal: Patient s/p urgent dialysis yesterday. Euvolemic on exam today.  - Continue to monitor electrolytes. Hyper K currently resolved along with EKG changes.  - HD/ Nephrology follow    DKA resolved . Gap closed. Urine ketone negative.  - Started lantus 7u this am. Will turn off gtt insulin 1h after.  - renal diet.  - Continue to monitor FS goal 100-180 and anion gap at this time.  - Endocrine follow  DC home with close follow with PMD/ Cardiology/ Endocrine. QA  Pierce Viera MD pager [phone]
62 year old female with PMHx T1DM on insulin pump, HTN, HLD, former smoker, MI, CAD s/p PCI to RCA and brachytherapy in Aug 2017, dCHF (last TTE- July 2017- mild MR, mild AS, mild-mod TR EF65%) chronic LLE pain and edema in setting of severe PVD s/p L & R fem-pop bypass  graft,  multiple vascular surgery both leg w/ fem-pop bypass revisions , Subclavian vein stenosis left s/p stent, ESRD on HD (Tu/Thr/Sat) via L AVF with oliguria, CVA with memory deficit, depression, chronic pain management for LLE on oxycodone who presents with chest pain and malaise found to have K of 7, and in DKA.    Neuro: Currently AOX3, no issues.    CV:   - BP currently stable.  - No signs of cardiogenic shock on bedside US.  - Trop continue to elevate overnight, CK downtrending. No CP or EKG changes at this time.   - C/w heparin gtt, asa, plavix for NSTEMI management.      Pulm: Sating well on RA. No acute issues.  - PET from 10/17 with ground glass opacity RUL. Will need further w/u in the context of 30pack/year smoking history.    Renal: Patient s/p urgent dialysis yesterday. Euvolemic on exam today.  - Continue to monitor electrolytes. Hyper K currently resolved along with EKG changes.  - Appreciate further renal recs regarding dialysis management.    Endo: DKA resolved overnight. Gap closed. Urine ketone negative.  - Started lantus 7u this am. Will turn off gtt insulin 1h after.  - Started on renal diet.  - Continue to monitor FS goal 100-180 and anion gap at this time.    ID: no issues.  GI: Started on renal diet.  PPX: On heparin gtt for full AC.
62 year old female with PMHx T1DM on insulin pump, HTN, HLD, former smoker, MI, CAD s/p PCI to RCA and brachytherapy in Aug 2017, dCHF (last TTE- July 2017- mild MR, mild AS, mild-mod TR EF65%) chronic LLE pain and edema in setting of severe PVD s/p L & R fem-pop bypass  graft,  multiple vascular surgery both leg w/ fem-pop bypass revisions , Subclavian vein stenosis left s/p stent, ESRD on HD (Tu/Thr/Sat) via L AVF with oliguria, CVA with memory deficit, depression, chronic pain management for LLE on oxycodone who presents with chest pain and malaise found to have K of 7, and in DKA.    Neuro: Currently AOX3, no issues.    CV:   - BP currently stable.  - No signs of cardiogenic shock on bedside US.  - Will give 500cc bolus given hyperdynamic LV on US indicating low volume status.  - Trop continue to elevate overnight. No CP or EKG changes at this time. Dr. Vela cardiologist contacted and recommended heparin gtt for now. Will f/u further recs.    Pulm: Sating well on RA. No acute issues.  - PET from 10/17 with ground glass opacity RUL. Will need further w/u in the context of 30pack/year smoking history.    Renal: Patient s/p urgent dialysis overnight w/o fluid extraction  - Continue to monitor electrolytes. Hyper K currently resolved along with EKG changes.  - C/w D51/2NS 100cc/h.    Endo: DKA likely resolving with gap at 20 in the am (patient baseline gap 15-20 per chart review.  - Will continue with insulin gtt and d51/2NS; titrate to FSG<180s given ACS management.  - Will contact endocrine for further recs. (Patient use insulin pump at home.)    ID: no issues.  GI: C/w NPO for now.  PPX: On heparin gtt for full AC.
62 year old woman with history of T1DM on insulin pump, HTN, HLD, former smoker, MI, CAD s/p PCI to RCA and brachytherapy in Aug 2017, dCHF, chronic LLE pain, severe PVD s/p L & R fem-pop bypass graft,  multiple vascular surgery both leg w/ fem-pop bypass revisions, Subclavian vein stenosis left s/p stent, ESRD on HD (Tu/Thr/Sat) via L AVF with oliguria, CVA with memory deficit, depression, admitted with chest pain and malaise found to have K of 7, and in DKA.      1. Chest Pain, NSTEMI  in setting of metabolic abnl/DKA  pt with complicated coronary history with multiple PCI's, recurrent RCA restenosis, s/p brachytherapy  recently with pci to ostial RCA  remains chest pain free, no decomp hf   cont medical management of NSTEMI with IV heparin, asa, and plavix  echo to eval for change in lv fxn  no urgent indication for cath at present  once medically optimized and out of micu will d/w with pt and dr. batres regarding possible cath   cont bb    2. DKA  tx per micu     3. ESRD/HD  renal f/u
63 y/o F w/h/o T1DM with multiple complications and comorbidities including ESRD>on HD and CAD s/p stenting. Here with DKA/CP. Found to have non ST elevation MI follow by cardiology. Off heparin drip (Plavix and ASA). Glycemic control variable <100s yesterday but >200s today while on the same insulin doses. Had only juice this am due to poor PO intake and nausea. Going for cardiac cath today. Pt on [employer] insulin pump> inserted this am by CDE to teach pt its use but in "suspend" mode so pt is not getting any insulin via pump yet. Will start tomorrow morning once CDE sees pt again and reviews education. Also pt received Lantus insulin this am so will wait til tomorrow am to start pump. No hypoglycemia.    Will restart insulin pump tomorrow morning at 8am as follows:  Basal  12am – 0.275 units/hour  5am – 0.375 units/hour  ICR  12am-12am – 1:15  ISF  12am – 60  7am – 40  6pm – 60  BGT  12am – 110-130 mg/dl  8am – 100-120 mg/dl  Insulin duration: 6 hours
63 yo F with hx DM, dka esrd cad htn PAD presented to er with 3 days c/o N/V and abd pain. last hd yesterday---> ER. glu > 700 k 7.1. hco3- 13. and suspected MI as well    1- ESRD  2- DKA  3- SHPT     hd am   insulin drip  cont with renvela one tab with meals increase dose to 800 mg
61 y/o F w/h/o T1DM with multiple complications and comorbidities including ESRD>on HD and CAD s/p stenting. Here with DKA/CP. Found to have non ST elevation MI follow by cardiology. On heparin drip. Glycemic control variable up to 300s yesterday and 70s today. Pt reports tolerating POs but per staff pt with poor PO intake. Going for HD today. Pt uses [employer] insulin pump at home but is now on sq insulin. Pt has new insulin pump with her but she has not received education on its use.

## 2017-11-29 NOTE — ADVANCED PRACTICE NURSE CONSULT - ASSESSMENT
61 y/o F w/h/o T1DM with multiple complications and comorbidities including ESRD>on HD and CAD s/p  stenting. Here with DKA/CP. Found to have non ST elevation MI follow by cardiology. Pt came in with DKA in setting of MI.  Pt will be start using her new insulin pump today.  As per Primary RN, no Lantus or Humalog doses given this morning.  Old insulin pump settings transferred to new insulin pump and BG monitor synced to insulin pump as well.  RN, CDE reviews insulin pump infusion and reservoir set change with pt and adequate return demo/teach back obtained.   Insulin pump placed on temporary basal of 80% for 6 hours and 15 minutes (pt seen 15 mins earlier than planned time for temporary basal). In addition, breakfast tray arrived when RN, VAHID with pt and bolus administration technique reviewed with pt and adequate return demo observed.  Pt’s fbs 179 mg/dl and pt consuming 25 grams of carbs with 2.8 units correction/meal dose administered by pt.  Carb counting guidelines also reviewed with pt and adequate teach-back obtianed.  Insulin pump forms still need to be completed by pt and attestation form needs to be signed by Provider.  Primary RN made aware and will make sure pt gets forms completed and place them in chart.   Pt has sufficient amount insulin pump supplies with her.   New insulin pump site on left abdomen and remains clean, dry, and intact.  Pt’s insulin pump settings as follows:   Basal  12am – 0.275 units/hour  5am – 0.375 units/hour  ICR  12am-12am – 1:15  ISF  12am – 60  7am – 40  6pm – 60  BGT  12am – 110-130 mg/dl  8am – 100-120 mg/dl  Insulin duration: 6 hours

## 2017-11-29 NOTE — PROGRESS NOTE ADULT - PROVIDER SPECIALTY LIST ADULT
Cardiology
Endocrinology
Endocrinology
Internal Medicine
MICU
MICU
Nephrology
Cardiology
Endocrinology

## 2017-11-30 LAB
CULTURE RESULTS: SIGNIFICANT CHANGE UP
SPECIMEN SOURCE: SIGNIFICANT CHANGE UP

## 2017-12-13 NOTE — DISCHARGE NOTE ADULT - NS MD DC FALL RISK RISK
Refill request received via fax, entered and sent to provider’s office to address.     Medication not on file:    Pharmacy message on fax: 90 DAY SUPPLY     Prior Auth: No    Preferred contact number: [phone]         For information on Fall & Injury Prevention, visit [email or web address]

## 2017-12-18 ENCOUNTER — INPATIENT (INPATIENT)
Facility: HOSPITAL | Age: 62
LOS: 2 days | Discharge: HOME | DRG: 637 | End: 2017-12-21
Attending: INTERNAL MEDICINE | Admitting: INTERNAL MEDICINE
Payer: MEDICARE

## 2017-12-18 VITALS
SYSTOLIC BLOOD PRESSURE: 180 MMHG | RESPIRATION RATE: 19 BRPM | OXYGEN SATURATION: 100 % | DIASTOLIC BLOOD PRESSURE: 90 MMHG | HEART RATE: 85 BPM

## 2017-12-18 DIAGNOSIS — J81.0 ACUTE PULMONARY EDEMA: ICD-10-CM

## 2017-12-18 DIAGNOSIS — Z98.89 OTHER SPECIFIED POSTPROCEDURAL STATES: Chronic | ICD-10-CM

## 2017-12-18 LAB
ACETONE SERPL-MCNC: ABNORMAL
ALBUMIN SERPL ELPH-MCNC: 4 G/DL — SIGNIFICANT CHANGE UP (ref 3.3–5)
ALP SERPL-CCNC: 206 U/L — HIGH (ref 40–120)
ALT FLD-CCNC: 9 U/L RC — LOW (ref 10–45)
ANION GAP SERPL CALC-SCNC: 22 MMOL/L — HIGH (ref 5–17)
AST SERPL-CCNC: 26 U/L — SIGNIFICANT CHANGE UP (ref 10–40)
B-OH-BUTYR SERPL-SCNC: 2.2 MMOL/L — HIGH
BASE EXCESS BLDV CALC-SCNC: -1.5 MMOL/L — SIGNIFICANT CHANGE UP (ref -2–2)
BASOPHILS # BLD AUTO: 0 K/UL — SIGNIFICANT CHANGE UP (ref 0–0.2)
BASOPHILS NFR BLD AUTO: 0.3 % — SIGNIFICANT CHANGE UP (ref 0–2)
BILIRUB SERPL-MCNC: 0.3 MG/DL — SIGNIFICANT CHANGE UP (ref 0.2–1.2)
BUN SERPL-MCNC: 54 MG/DL — HIGH (ref 7–23)
CA-I SERPL-SCNC: 1.11 MMOL/L — LOW (ref 1.12–1.3)
CALCIUM SERPL-MCNC: 8.1 MG/DL — LOW (ref 8.4–10.5)
CHLORIDE BLDV-SCNC: 95 MMOL/L — LOW (ref 96–108)
CHLORIDE SERPL-SCNC: 91 MMOL/L — LOW (ref 96–108)
CO2 BLDV-SCNC: 26 MMOL/L — SIGNIFICANT CHANGE UP (ref 22–30)
CO2 SERPL-SCNC: 22 MMOL/L — SIGNIFICANT CHANGE UP (ref 22–31)
CREAT SERPL-MCNC: 7.33 MG/DL — HIGH (ref 0.5–1.3)
EOSINOPHIL # BLD AUTO: 0.1 K/UL — SIGNIFICANT CHANGE UP (ref 0–0.5)
EOSINOPHIL NFR BLD AUTO: 1.1 % — SIGNIFICANT CHANGE UP (ref 0–6)
GAS PNL BLDV: 135 MMOL/L — LOW (ref 136–145)
GAS PNL BLDV: SIGNIFICANT CHANGE UP
GLUCOSE BLDC GLUCOMTR-MCNC: 354 MG/DL — HIGH (ref 70–99)
GLUCOSE BLDV-MCNC: 367 MG/DL — HIGH (ref 70–99)
GLUCOSE SERPL-MCNC: 378 MG/DL — HIGH (ref 70–99)
HCO3 BLDV-SCNC: 25 MMOL/L — SIGNIFICANT CHANGE UP (ref 21–29)
HCT VFR BLD CALC: 31.4 % — LOW (ref 34.5–45)
HCT VFR BLDA CALC: 32 % — LOW (ref 39–50)
HGB BLD CALC-MCNC: 10.4 G/DL — LOW (ref 11.5–15.5)
HGB BLD-MCNC: 10.3 G/DL — LOW (ref 11.5–15.5)
LACTATE BLDV-MCNC: 1.1 MMOL/L — SIGNIFICANT CHANGE UP (ref 0.7–2)
LYMPHOCYTES # BLD AUTO: 1 K/UL — SIGNIFICANT CHANGE UP (ref 1–3.3)
LYMPHOCYTES # BLD AUTO: 10.3 % — LOW (ref 13–44)
MCHC RBC-ENTMCNC: 32.9 GM/DL — SIGNIFICANT CHANGE UP (ref 32–36)
MCHC RBC-ENTMCNC: 34.6 PG — HIGH (ref 27–34)
MCV RBC AUTO: 105 FL — HIGH (ref 80–100)
MONOCYTES # BLD AUTO: 0.6 K/UL — SIGNIFICANT CHANGE UP (ref 0–0.9)
MONOCYTES NFR BLD AUTO: 6.2 % — SIGNIFICANT CHANGE UP (ref 2–14)
NEUTROPHILS # BLD AUTO: 7.8 K/UL — HIGH (ref 1.8–7.4)
NEUTROPHILS NFR BLD AUTO: 82.2 % — HIGH (ref 43–77)
PCO2 BLDV: 52 MMHG — HIGH (ref 35–50)
PH BLDV: 7.3 — LOW (ref 7.35–7.45)
PLATELET # BLD AUTO: 286 K/UL — SIGNIFICANT CHANGE UP (ref 150–400)
PO2 BLDV: 31 MMHG — SIGNIFICANT CHANGE UP (ref 25–45)
POTASSIUM BLDV-SCNC: 5.6 MMOL/L — HIGH (ref 3.5–5)
POTASSIUM SERPL-MCNC: 6.8 MMOL/L — CRITICAL HIGH (ref 3.5–5.3)
POTASSIUM SERPL-SCNC: 6.8 MMOL/L — CRITICAL HIGH (ref 3.5–5.3)
PROT SERPL-MCNC: 7.6 G/DL — SIGNIFICANT CHANGE UP (ref 6–8.3)
RBC # BLD: 2.99 M/UL — LOW (ref 3.8–5.2)
RBC # FLD: 11.9 % — SIGNIFICANT CHANGE UP (ref 10.3–14.5)
SAO2 % BLDV: 44 % — LOW (ref 67–88)
SODIUM SERPL-SCNC: 135 MMOL/L — SIGNIFICANT CHANGE UP (ref 135–145)
TROPONIN T SERPL-MCNC: 0.1 NG/ML — HIGH (ref 0–0.06)
WBC # BLD: 9.4 K/UL — SIGNIFICANT CHANGE UP (ref 3.8–10.5)
WBC # FLD AUTO: 9.4 K/UL — SIGNIFICANT CHANGE UP (ref 3.8–10.5)

## 2017-12-18 PROCEDURE — 99291 CRITICAL CARE FIRST HOUR: CPT | Mod: 25

## 2017-12-18 PROCEDURE — 71010: CPT | Mod: 26

## 2017-12-18 PROCEDURE — 99291 CRITICAL CARE FIRST HOUR: CPT

## 2017-12-18 PROCEDURE — 93010 ELECTROCARDIOGRAM REPORT: CPT

## 2017-12-18 RX ORDER — FUROSEMIDE 40 MG
80 TABLET ORAL ONCE
Qty: 0 | Refills: 0 | Status: COMPLETED | OUTPATIENT
Start: 2017-12-18 | End: 2017-12-18

## 2017-12-18 RX ORDER — HYDRALAZINE HCL 50 MG
25 TABLET ORAL EVERY 8 HOURS
Qty: 0 | Refills: 0 | Status: DISCONTINUED | OUTPATIENT
Start: 2017-12-18 | End: 2017-12-20

## 2017-12-18 RX ORDER — CINACALCET 30 MG/1
60 TABLET, FILM COATED ORAL DAILY
Qty: 0 | Refills: 0 | Status: DISCONTINUED | OUTPATIENT
Start: 2017-12-18 | End: 2017-12-21

## 2017-12-18 RX ORDER — ASPIRIN/CALCIUM CARB/MAGNESIUM 324 MG
81 TABLET ORAL DAILY
Qty: 0 | Refills: 0 | Status: DISCONTINUED | OUTPATIENT
Start: 2017-12-18 | End: 2017-12-21

## 2017-12-18 RX ORDER — LISINOPRIL 2.5 MG/1
40 TABLET ORAL DAILY
Qty: 0 | Refills: 0 | Status: DISCONTINUED | OUTPATIENT
Start: 2017-12-18 | End: 2017-12-21

## 2017-12-18 RX ORDER — METOPROLOL TARTRATE 50 MG
25 TABLET ORAL
Qty: 0 | Refills: 0 | Status: DISCONTINUED | OUTPATIENT
Start: 2017-12-18 | End: 2017-12-21

## 2017-12-18 RX ORDER — FUROSEMIDE 40 MG
40 TABLET ORAL DAILY
Qty: 0 | Refills: 0 | Status: DISCONTINUED | OUTPATIENT
Start: 2017-12-18 | End: 2017-12-21

## 2017-12-18 RX ORDER — HEPARIN SODIUM 5000 [USP'U]/ML
5000 INJECTION INTRAVENOUS; SUBCUTANEOUS EVERY 8 HOURS
Qty: 0 | Refills: 0 | Status: DISCONTINUED | OUTPATIENT
Start: 2017-12-18 | End: 2017-12-21

## 2017-12-18 RX ORDER — INSULIN HUMAN 100 [IU]/ML
5 INJECTION, SOLUTION SUBCUTANEOUS ONCE
Qty: 0 | Refills: 0 | Status: COMPLETED | OUTPATIENT
Start: 2017-12-18 | End: 2017-12-18

## 2017-12-18 RX ORDER — ATORVASTATIN CALCIUM 80 MG/1
20 TABLET, FILM COATED ORAL AT BEDTIME
Qty: 0 | Refills: 0 | Status: DISCONTINUED | OUTPATIENT
Start: 2017-12-18 | End: 2017-12-21

## 2017-12-18 RX ORDER — SEVELAMER CARBONATE 2400 MG/1
2400 POWDER, FOR SUSPENSION ORAL
Qty: 0 | Refills: 0 | Status: DISCONTINUED | OUTPATIENT
Start: 2017-12-18 | End: 2017-12-21

## 2017-12-18 RX ORDER — DULOXETINE HYDROCHLORIDE 30 MG/1
60 CAPSULE, DELAYED RELEASE ORAL DAILY
Qty: 0 | Refills: 0 | Status: DISCONTINUED | OUTPATIENT
Start: 2017-12-18 | End: 2017-12-21

## 2017-12-18 RX ORDER — CLOPIDOGREL BISULFATE 75 MG/1
75 TABLET, FILM COATED ORAL AT BEDTIME
Qty: 0 | Refills: 0 | Status: DISCONTINUED | OUTPATIENT
Start: 2017-12-18 | End: 2017-12-21

## 2017-12-18 RX ADMIN — Medication 80 MILLIGRAM(S): at 20:45

## 2017-12-18 RX ADMIN — INSULIN HUMAN 5 UNIT(S): 100 INJECTION, SOLUTION SUBCUTANEOUS at 20:51

## 2017-12-18 NOTE — ED PROVIDER NOTE - MEDICAL DECISION MAKING DETAILS
63 y/o F pt with PMHx of DM, HTN presents to the ED for SOB since this morning. Likely hemodialysis fluid overload due to sx of hypoxia, diminished breath sounds and crackles. Plan: BiPAP, labs, CXR. Pt  states she makes urine, will give Lasix and admit

## 2017-12-18 NOTE — H&P ADULT - ASSESSMENT
ASSESSMENT: Pt is a 62F with PMHx T1DM (on insulin pump), HTN, HLD, MI, CAD s/p PCI to RCA and brachytherapy in Aug 2017, dCHF (last TTE- July 2017- mild MR, mild AS, mild-mod TR EF65%) chronic LLE pain and edema in setting of severe PVD s/p L & R fem-pop bypass  graft, ESRD on HD (Tu/Thr/Sat) via L AVF with oliguria, CVA with memory deficit, who presents with shortness of breath secondary to fluid overload found to be in mild DKA.    PLAN:    #Neuro      #Pulmonary      #Cardiac      #Endo      #Heme      #ID      #Renal      #DVT ppx    #GOC  Full code ASSESSMENT: Pt is a 62F with PMHx T1DM (on insulin pump), HTN, HLD, MI, CAD s/p PCI to RCA and brachytherapy in Aug 2017, dCHF (last TTE- July 2017- mild MR, mild AS, mild-mod TR EF65%) chronic LLE pain and edema in setting of severe PVD s/p L & R fem-pop bypass  graft, ESRD on HD (Tu/Thr/Sat) via L AVF with oliguria, CVA with memory deficit, who presents with shortness of breath secondary to fluid overload found to be in mild DKA.    PLAN:    #Neuro  -AAOx4 on admission, currently mentating at baseline  -cont to monitor mental status  -has hx of CVA, c/w home atorvastatin    #Pulmonary  -pt p/w SOB, most likely 2/2 fluid overload as CXR showed pulmonary edema. Saturating well  -c/w BiPAP, lasix   -send RVP    #Cardiac  -has hx of dHF, TTE on 11/27 showed EF 40-45%, mild-mod mitral regurg, mild-mod LVSD, mild concentric LVH, moderate pulm HTN  -f/u BNP, hold IVF for now  -c/w lasix for diuresis, monitor UOP (pt makes minimal urine 2/2 ESRD)  -c/w home lisinopril, hydralazine for HTN  -will switch home toprol XL 50mg to metoprolol 25mg BID  -c/w home ASA/plavix for CAD    #Endo  -pt in DKA on presentation with elevated AG, beta-hydroxybutyrate, and small serum acetone. Endo following, f/u recs  -insulin gtt, check BS Q1H  -BMP Q4H    #Heme  -anemic on presentation, stable. At baseline per previous hospital records  -follow CBC    #ID  -has URI sx on admission  -afebrile, no leukocytosis  -f/u RVP, will monitor off abx    #Renal  -has ESRD, on dialysis Tu/Thr/Sat. Now s/p urgent HD for hyperkalemia, fluid overload  -renal following, appreciate recs  -Trend BMP, monitor UOP  -c/w home sensipar, renagel    #DVT ppx  -heparin subQ    #GOC  Full code    Ivory Casey MD PGY1  Pager [phone] ASSESSMENT: Pt is a 62F with PMHx T1DM (on insulin pump), HTN, HLD, MI, CAD s/p PCI to RCA and brachytherapy in Aug 2017, dCHF (last TTE- July 2017- mild MR, mild AS, mild-mod TR EF65%) chronic LLE pain and edema in setting of severe PVD s/p L & R fem-pop bypass  graft, ESRD on HD (Tu/Thr/Sat) via L AVF with oliguria, CVA with memory deficit, who presents with shortness of breath secondary to fluid overload found to be in mild DKA.    PLAN:    #Neuro  -AAOx4 on admission, currently mentating at baseline  -cont to monitor mental status  -has hx of CVA, c/w home atorvastatin    #Pulmonary  -pt p/w SOB, most likely 2/2 fluid overload as CXR showed pulmonary edema. Saturating well  -c/w BiPAP, lasix   -send RVP    #Cardiac  -has hx of dHF, TTE on 11/27 showed EF 40-45%, mild-mod mitral regurg, mild-mod LVSD, mild concentric LVH, moderate pulm HTN  -f/u BNP, hold IVF for now  -c/w lasix for diuresis, monitor UOP (pt makes minimal urine 2/2 ESRD)  -c/w home lisinopril, hydralazine for HTN  -will switch home toprol XL 50mg to metoprolol 25mg BID  -c/w home ASA/plavix for CAD    #Endo  -pt in DKA on presentation with elevated AG, beta-hydroxybutyrate, and small serum acetone. Endo following, f/u recs  -will d/c insulin pump  -insulin gtt, check BS Q1H  -BMP Q4H    #Heme  -anemic on presentation, stable. At baseline per previous hospital records  -follow CBC    #ID  -has URI sx on admission  -afebrile, no leukocytosis  -f/u RVP, will monitor off abx    #Renal  -has ESRD, on dialysis Tu/Thr/Sat. Now s/p urgent HD for hyperkalemia, fluid overload  -renal following, appreciate recs  -Trend BMP, monitor UOP  -c/w home sensipar, renagel    #DVT ppx  -heparin subQ    #GOC  Full code    Ivory Casey MD PGY1  Pager [phone] ASSESSMENT: Pt is a 62F with PMHx T1DM (on insulin pump), HTN, HLD, MI, CAD s/p PCI to RCA and brachytherapy in Aug 2017, dCHF (last TTE- July 2017- mild MR, mild AS, mild-mod TR EF65%) chronic LLE pain and edema in setting of severe PVD s/p L & R fem-pop bypass  graft, ESRD on HD (Tu/Thr/Sat) via L AVF with oliguria, CVA with memory deficit, who presents with shortness of breath secondary to fluid overload found to be in mild DKA.    PLAN:    #Neuro  -AAOx4 on admission, currently mentating at baseline  -cont to monitor mental status  -has hx of CVA, c/w home atorvastatin    #Pulmonary  -pt p/w SOB, most likely 2/2 fluid overload as CXR showed pulmonary edema. Saturating well  -c/w BiPAP, lasix   -send RVP    #Cardiac  -has hx of dHF, TTE on 11/27 showed EF 40-45%, mild-mod mitral regurg, mild-mod LVSD, mild concentric LVH, moderate pulm HTN  -f/u BNP, hold IVF for now  -c/w lasix for diuresis, monitor UOP (pt makes minimal urine 2/2 ESRD)  -c/w home lisinopril, hydralazine for HTN  -will switch home toprol XL 50mg to metoprolol 25mg BID  -c/w home ASA/plavix for CAD    #Endo  -pt in DKA on presentation with elevated AG, beta-hydroxybutyrate, and small serum acetone. Endo following, f/u recs  -will d/c insulin pump  -insulin gtt, check BS Q1H  -BMP Q4H    #Heme  -anemic on presentation, stable. At baseline per previous hospital records  -follow CBC    #ID  -has URI sx on admission  -afebrile, no leukocytosis  -f/u RVP, will monitor off abx    #Renal  -has ESRD, on dialysis Tu/Thr/Sat. Now s/p urgent HD for hyperkalemia, fluid overload  -renal following, appreciate recs  -Trend BMP, monitor UOP  -c/w home sensipar, renagel    #FEN/GI  -NPO in accordance with DKA protocol    #DVT ppx  -heparin subQ    #GOC  Full code    Ivory Casey MD PGY1  Pager [phone] ASSESSMENT: Pt is a 62F with PMHx T1DM (on insulin pump), HTN, HLD, MI, CAD s/p PCI to RCA and brachytherapy in Aug 2017, dCHF (last TTE- July 2017- mild MR, mild AS, mild-mod TR EF65%) chronic LLE pain and edema in setting of severe PVD s/p L & R fem-pop bypass  graft, ESRD on HD (Tu/Thr/Sat) via L AVF with oliguria, CVA with memory deficit, who presents with shortness of breath secondary to fluid overload found to be in mild DKA.    PLAN:    #Neuro  -AAOx4 on admission, currently mentating at baseline  -cont to monitor mental status  -has hx of CVA, c/w home atorvastatin    #Pulmonary  -pt p/w SOB, most likely 2/2 fluid overload as CXR showed pulmonary edema. Saturating well on BiPAP.  -now s/p 3L fluid removal in HD, BP controlled and acceptable - will trial off bipap  -send RVP    #Cardiac  -has hx of dHF, TTE on 11/27 showed EF 40-45%, mild-mod mitral regurg, mild-mod LVSD, mild concentric LVH, moderate pulm HTN  -f/u BNP, hold IVF for now  -c/w lasix for diuresis  -c/w home lisinopril, hydralazine for HTN  -will switch home toprol XL 50mg to metoprolol 25mg BID  -c/w home ASA/plavix for CAD    #Endo  -pt in DKA on presentation with elevated AG, beta-hydroxybutyrate, and small serum acetone. Endo following, f/u recs  -will d/c insulin pump  -insulin gtt, check BS Q1H  -BMP Q4H    #Heme  -anemic on presentation, stable. At baseline per previous hospital records  -follow CBC    #ID  -has URI sx on admission  -afebrile, no leukocytosis  -f/u RVP, will monitor off abx    #Renal  -has ESRD, on dialysis Tu/Thr/Sat. Now s/p urgent HD for hyperkalemia, fluid overload  -renal following, appreciate recs  -Trend BMP, monitor UOP  -c/w home sensipar, renagel    #FEN/GI  -NPO in accordance with DKA protocol    #DVT ppx  -heparin subQ    #GOC  Full code    Ivory Casey MD PGY1  Pager [phone]

## 2017-12-18 NOTE — H&P ADULT - NSHPREVIEWOFSYSTEMS_GEN_ALL_CORE
REVIEW OF SYSTEMS:    CONSTITUTIONAL: No weakness, fevers or chills  EYES/ENT: +congestion, +runny nose, No visual changes;  No vertigo or throat pain   NECK: No pain or stiffness  RESPIRATORY: +shortness of breath, +cough, no wheezing  CARDIOVASCULAR: No chest pain or palpitations  GASTROINTESTINAL: No abdominal or epigastric pain. No nausea, vomiting, or hematemesis; No diarrhea or constipation. No melena or hematochezia.  GENITOURINARY: No dysuria, frequency or hematuria  NEUROLOGICAL: No numbness or weakness  SKIN: No itching, burning, rashes, or lesions   All other review of systems is negative unless indicated above.

## 2017-12-18 NOTE — H&P ADULT - NSHPPHYSICALEXAM_GEN_ALL_CORE
.  VITAL SIGNS:  T(C): 37.4 (12-18-17 @ 22:00), Max: 37.4 (12-18-17 @ 22:00)  T(F): 99.3 (12-18-17 @ 22:00), Max: 99.3 (12-18-17 @ 22:00)  HR: 89 (12-18-17 @ 22:00) (85 - 90)  BP: 158/70 (12-18-17 @ 22:00) (151/56 - 180/90)  BP(mean): --  RR: 22 (12-18-17 @ 22:00) (18 - 22)  SpO2: 99% (12-18-17 @ 22:00) (98% - 100%)  Wt(kg): --    PHYSICAL EXAM:    Constitutional: WDWN resting comfortably in bed; NAD  Head: NC/AT  Eyes: PERRLA, EOMI, clear conjunctiva  ENT: no nasal discharge; no oropharyngeal erythema or exudates; dry oral mucosa  Neck: supple; no JVD or thyromegaly  Respiratory: Wheezes heard in all lung fields bilaterally  Cardiac: +S1/S2; RRR; no M/R/G; PMI non-displaced  Gastrointestinal: soft, NT/ND; no rebound or guarding; +BS, no hepatosplenomegaly  Extremities: 2+ pitting edema in LLE, nonpitting edema in RLE  Vascular: 2+ radial, DP/PT pulses B/L  Lymphatic: no submandibular or cervical LAD  Neurologic: AAOx3 .  VITAL SIGNS:  T(C): 37.4 (12-18-17 @ 22:00), Max: 37.4 (12-18-17 @ 22:00)  T(F): 99.3 (12-18-17 @ 22:00), Max: 99.3 (12-18-17 @ 22:00)  HR: 89 (12-18-17 @ 22:00) (85 - 90)  BP: 158/70 (12-18-17 @ 22:00) (151/56 - 180/90)  BP(mean): --  RR: 22 (12-18-17 @ 22:00) (18 - 22)  SpO2: 99% (12-18-17 @ 22:00) (98% - 100%)  Wt(kg): --    PHYSICAL EXAM:    Constitutional: WDWN resting comfortably in bed; NAD  Head: NC/AT  Eyes: PERRLA, EOMI, clear conjunctiva  ENT: no nasal discharge; no oropharyngeal erythema or exudates; dry oral mucosa  Neck: supple; no JVD or thyromegaly  Respiratory: Wheezes heard in all lung fields bilaterally  Cardiac: +S1/S2; RRR; no M/R/G; PMI non-displaced  Gastrointestinal: soft, NT/ND; no rebound or guarding; +BS, insulin pump intact  Extremities: 2+ pitting edema in LLE, nonpitting edema in RLE  Vascular: 2+ radial, DP/PT pulses B/L  Lymphatic: no submandibular or cervical LAD  Neurologic: AAOx3

## 2017-12-18 NOTE — H&P ADULT - NSHPLABSRESULTS_GEN_ALL_CORE
10.3   9.4   )-----------( 286      ( 18 Dec 2017 19:46 )             31.4       12-18    135  |  91<L>  |  54<H>  ----------------------------<  378<H>  6.8<HH>   |  22  |  7.33<H>    Ca    8.1<L>      18 Dec 2017 19:46    TPro  7.6  /  Alb  4.0  /  TBili  0.3  /  DBili  x   /  AST  26  /  ALT  9<L>  /  AlkPhos  206<H>  12-18                      Lactate Trend      CARDIAC MARKERS ( 18 Dec 2017 19:46 )  x     / 0.10 ng/mL / x     / x     / x            CAPILLARY BLOOD GLUCOSE      POCT Blood Glucose.: 354 mg/dL (18 Dec 2017 21:15) 10.3   9.4   )-----------( 286      ( 18 Dec 2017 19:46 )             31.4       12-18    135  |  91<L>  |  54<H>  ----------------------------<  378<H>  6.8<HH>   |  22  |  7.33<H>    Ca    8.1<L>      18 Dec 2017 19:46    TPro  7.6  /  Alb  4.0  /  TBili  0.3  /  DBili  x   /  AST  26  /  ALT  9<L>  /  AlkPhos  206<H>  12-18      POCT Blood Glucose.: 354 mg/dL (18 Dec 2017 21:15)

## 2017-12-18 NOTE — ED PROVIDER NOTE - OBJECTIVE STATEMENT
61 y/o F pt with PMHx of DM, HTN c/o SOB since this morning with associated coughing. Pt has been put on a BiPAP in the past. Pt is a dialysis pt. Normally gets dialysis on Tuesdays, Thursdays and Saturdays. States that she does urinate but very little amounts. Pt was hospitalized for DKA in November. Pt states her left leg is always more swollen than the right. Denies CP, fevers, hx of DVT or any other complaints.

## 2017-12-18 NOTE — H&P ADULT - HISTORY OF PRESENT ILLNESS
Pt is a 62F with PMHx T1DM (on insulin pump), HTN, HLD, former smoker, MI, CAD s/p PCI to RCA and brachytherapy in Aug 2017, dCHF (last TTE- July 2017- mild MR, mild AS, mild-mod TR EF65%) chronic LLE pain and edema in setting of severe PVD s/p L & R fem-pop bypass  graft,  multiple vascular surgery both leg w/ fem-pop bypass revisions , Subclavian vein stenosis left s/p stent, ESRD on HD (Tu/Thr/Sat) via L AVF with oliguria, CVA with memory deficit, depression who presents with shortness of breath    The patient has multiple admissions for DKA, most recently from 11/24 - 11/29. This past week, pt states she has had a cold with congestion, cough, runny nose. Denies fevers, chills, nausea, vomiting, chest pain, diarrhea, or constipation. Denies recent travel or sick contacts, but states she has a grandson who had a URI recently. This AM she woke up acutely short of breath which prompted her to come to the ED. She states she has been compliant with her home medications and her blood glucoses recently have been in the 200s and 300s. She states her  manages her insulin pump.    A CXR in the ED showed pulmonary edema, so she was placed on BiPAP, given 80mg IV lasix. Labs were significant for a K+ of 6.8 so house renal was called for urgent dialysis. Pt is a 62F with PMHx T1DM (on insulin pump), HTN, HLD, former smoker, MI, CAD s/p PCI to RCA and brachytherapy in Aug 2017, dCHF (last TTE- November 2017- mild-mod MR, mild AS, mild-mod TR EF40-45%) chronic LLE pain and edema in setting of severe PVD s/p L & R fem-pop bypass  graft,  multiple vascular surgery both leg w/ fem-pop bypass revisions , Subclavian vein stenosis left s/p stent, ESRD on HD (Tu/Thr/Sat) via L AVF with oliguria, CVA with memory deficit, depression who presents with shortness of breath.     The patient has multiple admissions for DKA, most recently from 11/24 - 11/29. This past week, pt states she has had a cold with congestion, cough, runny nose. Denies fevers, chills, nausea, vomiting, chest pain, diarrhea, or constipation. Denies recent travel or sick contacts, but states she has a grandson who had a URI recently. This AM she woke up acutely short of breath which prompted her to come to the ED. She states she has been compliant with her home medications and her blood glucoses recently have been in the 200s and 300s. She states her  manages her insulin pump.    In the ED, VS were T98.5 HR83 /90 SaO2 100% on room air. A CXR showed pulmonary edema; she was placed on BiPAP, given 80mg IV lasix. Labs were significant for a K+ of 6.8 so house renal was called for urgent dialysis. Lab work was also significant for a beta-hydroxybutyrate of 2.2, small serum acetone, blood glucose of 387, an anion gap of 22,  and pH of 7.3 on VBG. She was admitted to the MICU after emergent HD for further management of DKA.

## 2017-12-18 NOTE — ED PROVIDER NOTE - PROGRESS NOTE DETAILS
Nephrologist at bed side. Insulin given for hyperglycemia. Will take pt straight to hemodialysis Endocrine paged as pt has insulin pump. have not received call back. spoke with endocrine fellow who suggests pt manages own insulin pump if not too ill and mental status normal. She says he son will bring in her supplies. will add ketone and beta hydroxy Spoke with endocrine fellow again, concerned w/ pthx of DKA and glucose not trending down despite 5u insulin IV and AG 22, ph 7.3. ketone pending. Would like pt started on insulin gtt. pt off floor to HD. Dr. Viera, admitting hospitalist, paged to update on endocrine recs. Spoke with MICU, who will evaluate pt who is well known to them. Likely will take pt to MICU from HD

## 2017-12-18 NOTE — ED ADULT NURSE NOTE - OBJECTIVE STATEMENT
62 y.o female aaox3 bibems from home secondary to SOB, h/o ESRD on HD Tues, Thursday and Saturday, last dialyzed Saturday, HTN, DM and lung disease as per patient, rales noted on lung fields, bilateral 2+ swelling lower extremities, left larger than the rt leg as per pt. No fever or chills at home, denies any nausea, vomiting, chest pain or abdominal pain. Claimed urinates very little. 62 y.o female aaox3 bibems from home secondary to SOB, h/o ESRD on HD Tues, Thursday and Saturday, last dialyzed Saturday, HTN, DM and lung disease as per patient, rales noted on lung fields, bilateral 2+ swelling lower extremities, left larger than the rt leg as per pt. No fever or chills at home, denies any nausea, vomiting, chest pain or abdominal pain. Claimed urinates very little. Noted insulin pump attached to abdomen. Placed on bipap to improve breathing.

## 2017-12-18 NOTE — H&P ADULT - ATTENDING COMMENTS
care provided 12/18/17  critical care time 40 mins  Patient seen and examined.  Agree with resident note as above.  Patient with history as above presents with dyspnea and found to have pulmonary edema, hyperkalemia, DKA.  Is now s/p HD with normalization of her K and blood pressure.  Will bring to MICU for insulin gtt, q1h FSG, DKA protocol.  Patient has no overt bacterial infection or cardiac ischemia as a DKA exacerbant - may be related to her viral URI this past week.  Full plan as above.

## 2017-12-19 DIAGNOSIS — N18.6 END STAGE RENAL DISEASE: ICD-10-CM

## 2017-12-19 DIAGNOSIS — Z96.41 PRESENCE OF INSULIN PUMP (EXTERNAL) (INTERNAL): ICD-10-CM

## 2017-12-19 DIAGNOSIS — E10.65 TYPE 1 DIABETES MELLITUS WITH HYPERGLYCEMIA: ICD-10-CM

## 2017-12-19 LAB
ACETONE SERPL-MCNC: ABNORMAL
ACETONE SERPL-MCNC: ABNORMAL
ACETONE SERPL-MCNC: NEGATIVE — SIGNIFICANT CHANGE UP
ALBUMIN SERPL ELPH-MCNC: 3.4 G/DL — SIGNIFICANT CHANGE UP (ref 3.3–5)
ALP SERPL-CCNC: 180 U/L — HIGH (ref 40–120)
ALT FLD-CCNC: 8 U/L RC — LOW (ref 10–45)
ANION GAP SERPL CALC-SCNC: 12 MMOL/L — SIGNIFICANT CHANGE UP (ref 5–17)
ANION GAP SERPL CALC-SCNC: 13 MMOL/L — SIGNIFICANT CHANGE UP (ref 5–17)
ANION GAP SERPL CALC-SCNC: 16 MMOL/L — SIGNIFICANT CHANGE UP (ref 5–17)
ANION GAP SERPL CALC-SCNC: 21 MMOL/L — HIGH (ref 5–17)
APTT BLD: 28.8 SEC — SIGNIFICANT CHANGE UP (ref 27.5–37.4)
AST SERPL-CCNC: 11 U/L — SIGNIFICANT CHANGE UP (ref 10–40)
B-OH-BUTYR SERPL-SCNC: 0 MMOL/L — SIGNIFICANT CHANGE UP
B-OH-BUTYR SERPL-SCNC: 0.2 MMOL/L — SIGNIFICANT CHANGE UP
B-OH-BUTYR SERPL-SCNC: 1.8 MMOL/L — HIGH
BILIRUB SERPL-MCNC: 0.4 MG/DL — SIGNIFICANT CHANGE UP (ref 0.2–1.2)
BUN SERPL-MCNC: 11 MG/DL — SIGNIFICANT CHANGE UP (ref 7–23)
BUN SERPL-MCNC: 28 MG/DL — HIGH (ref 7–23)
BUN SERPL-MCNC: 31 MG/DL — HIGH (ref 7–23)
BUN SERPL-MCNC: 34 MG/DL — HIGH (ref 7–23)
CALCIUM SERPL-MCNC: 8.2 MG/DL — LOW (ref 8.4–10.5)
CALCIUM SERPL-MCNC: 8.2 MG/DL — LOW (ref 8.4–10.5)
CALCIUM SERPL-MCNC: 8.4 MG/DL — SIGNIFICANT CHANGE UP (ref 8.4–10.5)
CALCIUM SERPL-MCNC: 8.6 MG/DL — SIGNIFICANT CHANGE UP (ref 8.4–10.5)
CHLORIDE SERPL-SCNC: 93 MMOL/L — LOW (ref 96–108)
CHLORIDE SERPL-SCNC: 95 MMOL/L — LOW (ref 96–108)
CHLORIDE SERPL-SCNC: 97 MMOL/L — SIGNIFICANT CHANGE UP (ref 96–108)
CHLORIDE SERPL-SCNC: 97 MMOL/L — SIGNIFICANT CHANGE UP (ref 96–108)
CK MB BLD-MCNC: 2.2 % — SIGNIFICANT CHANGE UP (ref 0–3.5)
CK MB CFR SERPL CALC: 2.2 NG/ML — SIGNIFICANT CHANGE UP (ref 0–3.8)
CK SERPL-CCNC: 102 U/L — SIGNIFICANT CHANGE UP (ref 25–170)
CO2 SERPL-SCNC: 21 MMOL/L — LOW (ref 22–31)
CO2 SERPL-SCNC: 27 MMOL/L — SIGNIFICANT CHANGE UP (ref 22–31)
CO2 SERPL-SCNC: 30 MMOL/L — SIGNIFICANT CHANGE UP (ref 22–31)
CO2 SERPL-SCNC: 31 MMOL/L — SIGNIFICANT CHANGE UP (ref 22–31)
CREAT SERPL-MCNC: 2.49 MG/DL — HIGH (ref 0.5–1.3)
CREAT SERPL-MCNC: 4.49 MG/DL — HIGH (ref 0.5–1.3)
CREAT SERPL-MCNC: 5.24 MG/DL — HIGH (ref 0.5–1.3)
CREAT SERPL-MCNC: 5.44 MG/DL — HIGH (ref 0.5–1.3)
GAS PNL BLDV: SIGNIFICANT CHANGE UP
GLUCOSE BLDC GLUCOMTR-MCNC: 105 MG/DL — HIGH (ref 70–99)
GLUCOSE BLDC GLUCOMTR-MCNC: 138 MG/DL — HIGH (ref 70–99)
GLUCOSE BLDC GLUCOMTR-MCNC: 144 MG/DL — HIGH (ref 70–99)
GLUCOSE BLDC GLUCOMTR-MCNC: 144 MG/DL — HIGH (ref 70–99)
GLUCOSE BLDC GLUCOMTR-MCNC: 146 MG/DL — HIGH (ref 70–99)
GLUCOSE BLDC GLUCOMTR-MCNC: 153 MG/DL — HIGH (ref 70–99)
GLUCOSE BLDC GLUCOMTR-MCNC: 177 MG/DL — HIGH (ref 70–99)
GLUCOSE BLDC GLUCOMTR-MCNC: 184 MG/DL — HIGH (ref 70–99)
GLUCOSE BLDC GLUCOMTR-MCNC: 196 MG/DL — HIGH (ref 70–99)
GLUCOSE BLDC GLUCOMTR-MCNC: 209 MG/DL — HIGH (ref 70–99)
GLUCOSE BLDC GLUCOMTR-MCNC: 228 MG/DL — HIGH (ref 70–99)
GLUCOSE BLDC GLUCOMTR-MCNC: 230 MG/DL — HIGH (ref 70–99)
GLUCOSE BLDC GLUCOMTR-MCNC: 243 MG/DL — HIGH (ref 70–99)
GLUCOSE BLDC GLUCOMTR-MCNC: 270 MG/DL — HIGH (ref 70–99)
GLUCOSE BLDC GLUCOMTR-MCNC: 275 MG/DL — HIGH (ref 70–99)
GLUCOSE BLDC GLUCOMTR-MCNC: 287 MG/DL — HIGH (ref 70–99)
GLUCOSE BLDC GLUCOMTR-MCNC: 70 MG/DL — SIGNIFICANT CHANGE UP (ref 70–99)
GLUCOSE BLDC GLUCOMTR-MCNC: 72 MG/DL — SIGNIFICANT CHANGE UP (ref 70–99)
GLUCOSE BLDC GLUCOMTR-MCNC: 81 MG/DL — SIGNIFICANT CHANGE UP (ref 70–99)
GLUCOSE SERPL-MCNC: 160 MG/DL — HIGH (ref 70–99)
GLUCOSE SERPL-MCNC: 256 MG/DL — HIGH (ref 70–99)
GLUCOSE SERPL-MCNC: 289 MG/DL — HIGH (ref 70–99)
GLUCOSE SERPL-MCNC: 77 MG/DL — SIGNIFICANT CHANGE UP (ref 70–99)
HAV IGM SER-ACNC: SIGNIFICANT CHANGE UP
HBV CORE AB SER-ACNC: SIGNIFICANT CHANGE UP
HBV CORE IGM SER-ACNC: SIGNIFICANT CHANGE UP
HBV SURFACE AB SER-ACNC: <3 MIU/ML — LOW
HBV SURFACE AG SER-ACNC: SIGNIFICANT CHANGE UP
HCT VFR BLD CALC: 27.7 % — LOW (ref 34.5–45)
HCV AB S/CO SERPL IA: 0.12 S/CO — SIGNIFICANT CHANGE UP
HCV AB SERPL-IMP: SIGNIFICANT CHANGE UP
HGB BLD-MCNC: 8.9 G/DL — LOW (ref 11.5–15.5)
INR BLD: 1.01 RATIO — SIGNIFICANT CHANGE UP (ref 0.88–1.16)
MAGNESIUM SERPL-MCNC: 2 MG/DL — SIGNIFICANT CHANGE UP (ref 1.6–2.6)
MAGNESIUM SERPL-MCNC: 2.3 MG/DL — SIGNIFICANT CHANGE UP (ref 1.6–2.6)
MAGNESIUM SERPL-MCNC: 2.4 MG/DL — SIGNIFICANT CHANGE UP (ref 1.6–2.6)
MCHC RBC-ENTMCNC: 32.2 GM/DL — SIGNIFICANT CHANGE UP (ref 32–36)
MCHC RBC-ENTMCNC: 33.8 PG — SIGNIFICANT CHANGE UP (ref 27–34)
MCV RBC AUTO: 105 FL — HIGH (ref 80–100)
PHOSPHATE SERPL-MCNC: 3.6 MG/DL — SIGNIFICANT CHANGE UP (ref 2.5–4.5)
PHOSPHATE SERPL-MCNC: 4 MG/DL — SIGNIFICANT CHANGE UP (ref 2.5–4.5)
PHOSPHATE SERPL-MCNC: 4.4 MG/DL — SIGNIFICANT CHANGE UP (ref 2.5–4.5)
PLATELET # BLD AUTO: 259 K/UL — SIGNIFICANT CHANGE UP (ref 150–400)
POTASSIUM SERPL-MCNC: 3.6 MMOL/L — SIGNIFICANT CHANGE UP (ref 3.5–5.3)
POTASSIUM SERPL-MCNC: 4 MMOL/L — SIGNIFICANT CHANGE UP (ref 3.5–5.3)
POTASSIUM SERPL-MCNC: 4.5 MMOL/L — SIGNIFICANT CHANGE UP (ref 3.5–5.3)
POTASSIUM SERPL-MCNC: 5.1 MMOL/L — SIGNIFICANT CHANGE UP (ref 3.5–5.3)
POTASSIUM SERPL-SCNC: 3.6 MMOL/L — SIGNIFICANT CHANGE UP (ref 3.5–5.3)
POTASSIUM SERPL-SCNC: 4 MMOL/L — SIGNIFICANT CHANGE UP (ref 3.5–5.3)
POTASSIUM SERPL-SCNC: 4.5 MMOL/L — SIGNIFICANT CHANGE UP (ref 3.5–5.3)
POTASSIUM SERPL-SCNC: 5.1 MMOL/L — SIGNIFICANT CHANGE UP (ref 3.5–5.3)
PROT SERPL-MCNC: 6.6 G/DL — SIGNIFICANT CHANGE UP (ref 6–8.3)
PROTHROM AB SERPL-ACNC: 11 SEC — SIGNIFICANT CHANGE UP (ref 9.8–12.7)
RAPID RVP RESULT: SIGNIFICANT CHANGE UP
RBC # BLD: 2.63 M/UL — LOW (ref 3.8–5.2)
RBC # FLD: 11.9 % — SIGNIFICANT CHANGE UP (ref 10.3–14.5)
SODIUM SERPL-SCNC: 136 MMOL/L — SIGNIFICANT CHANGE UP (ref 135–145)
SODIUM SERPL-SCNC: 137 MMOL/L — SIGNIFICANT CHANGE UP (ref 135–145)
SODIUM SERPL-SCNC: 140 MMOL/L — SIGNIFICANT CHANGE UP (ref 135–145)
SODIUM SERPL-SCNC: 140 MMOL/L — SIGNIFICANT CHANGE UP (ref 135–145)
TROPONIN T SERPL-MCNC: 0.11 NG/ML — HIGH (ref 0–0.06)
TROPONIN T SERPL-MCNC: 0.12 NG/ML — HIGH (ref 0–0.06)
TSH SERPL-MCNC: 1.07 UIU/ML — SIGNIFICANT CHANGE UP (ref 0.27–4.2)
WBC # BLD: 7.8 K/UL — SIGNIFICANT CHANGE UP (ref 3.8–10.5)
WBC # FLD AUTO: 7.8 K/UL — SIGNIFICANT CHANGE UP (ref 3.8–10.5)

## 2017-12-19 PROCEDURE — 99223 1ST HOSP IP/OBS HIGH 75: CPT

## 2017-12-19 PROCEDURE — 93010 ELECTROCARDIOGRAM REPORT: CPT

## 2017-12-19 RX ORDER — INSULIN LISPRO 100/ML
VIAL (ML) SUBCUTANEOUS
Qty: 0 | Refills: 0 | Status: DISCONTINUED | OUTPATIENT
Start: 2017-12-19 | End: 2017-12-20

## 2017-12-19 RX ORDER — OXYCODONE AND ACETAMINOPHEN 5; 325 MG/1; MG/1
1 TABLET ORAL EVERY 6 HOURS
Qty: 0 | Refills: 0 | Status: DISCONTINUED | OUTPATIENT
Start: 2017-12-19 | End: 2017-12-21

## 2017-12-19 RX ORDER — INSULIN GLARGINE 100 [IU]/ML
6 INJECTION, SOLUTION SUBCUTANEOUS EVERY MORNING
Qty: 0 | Refills: 0 | Status: DISCONTINUED | OUTPATIENT
Start: 2017-12-19 | End: 2017-12-19

## 2017-12-19 RX ORDER — INSULIN LISPRO 100/ML
3 VIAL (ML) SUBCUTANEOUS
Qty: 0 | Refills: 0 | Status: DISCONTINUED | OUTPATIENT
Start: 2017-12-19 | End: 2017-12-20

## 2017-12-19 RX ORDER — CINACALCET 30 MG/1
1 TABLET, FILM COATED ORAL
Qty: 0 | Refills: 0 | COMMUNITY

## 2017-12-19 RX ORDER — INSULIN LISPRO 100/ML
VIAL (ML) SUBCUTANEOUS
Qty: 0 | Refills: 0 | Status: DISCONTINUED | OUTPATIENT
Start: 2017-12-19 | End: 2017-12-19

## 2017-12-19 RX ORDER — SODIUM CHLORIDE 9 MG/ML
1000 INJECTION, SOLUTION INTRAVENOUS
Qty: 0 | Refills: 0 | Status: DISCONTINUED | OUTPATIENT
Start: 2017-12-19 | End: 2017-12-19

## 2017-12-19 RX ORDER — INSULIN LISPRO 100/ML
150 VIAL (ML) SUBCUTANEOUS
Qty: 0 | Refills: 0 | COMMUNITY

## 2017-12-19 RX ORDER — INSULIN LISPRO 100/ML
4 VIAL (ML) SUBCUTANEOUS
Qty: 0 | Refills: 0 | Status: DISCONTINUED | OUTPATIENT
Start: 2017-12-19 | End: 2017-12-19

## 2017-12-19 RX ORDER — INSULIN HUMAN 100 [IU]/ML
0.5 INJECTION, SOLUTION SUBCUTANEOUS
Qty: 100 | Refills: 0 | Status: DISCONTINUED | OUTPATIENT
Start: 2017-12-19 | End: 2017-12-19

## 2017-12-19 RX ORDER — DEXTROSE 50 % IN WATER 50 %
25 SYRINGE (ML) INTRAVENOUS ONCE
Qty: 0 | Refills: 0 | Status: COMPLETED | OUTPATIENT
Start: 2017-12-19 | End: 2017-12-19

## 2017-12-19 RX ADMIN — Medication 25 MILLIGRAM(S): at 05:57

## 2017-12-19 RX ADMIN — Medication 40 MILLIGRAM(S): at 05:57

## 2017-12-19 RX ADMIN — HEPARIN SODIUM 5000 UNIT(S): 5000 INJECTION INTRAVENOUS; SUBCUTANEOUS at 05:57

## 2017-12-19 RX ADMIN — Medication 81 MILLIGRAM(S): at 12:22

## 2017-12-19 RX ADMIN — SEVELAMER CARBONATE 2400 MILLIGRAM(S): 2400 POWDER, FOR SUSPENSION ORAL at 10:07

## 2017-12-19 RX ADMIN — ATORVASTATIN CALCIUM 20 MILLIGRAM(S): 80 TABLET, FILM COATED ORAL at 21:17

## 2017-12-19 RX ADMIN — SEVELAMER CARBONATE 2400 MILLIGRAM(S): 2400 POWDER, FOR SUSPENSION ORAL at 14:46

## 2017-12-19 RX ADMIN — OXYCODONE AND ACETAMINOPHEN 1 TABLET(S): 5; 325 TABLET ORAL at 13:00

## 2017-12-19 RX ADMIN — OXYCODONE AND ACETAMINOPHEN 1 TABLET(S): 5; 325 TABLET ORAL at 12:22

## 2017-12-19 RX ADMIN — INSULIN GLARGINE 6 UNIT(S): 100 INJECTION, SOLUTION SUBCUTANEOUS at 10:07

## 2017-12-19 RX ADMIN — Medication 25 MILLIGRAM(S): at 18:32

## 2017-12-19 RX ADMIN — CLOPIDOGREL BISULFATE 75 MILLIGRAM(S): 75 TABLET, FILM COATED ORAL at 21:17

## 2017-12-19 RX ADMIN — OXYCODONE AND ACETAMINOPHEN 1 TABLET(S): 5; 325 TABLET ORAL at 20:02

## 2017-12-19 RX ADMIN — Medication 25 MILLIGRAM(S): at 21:17

## 2017-12-19 RX ADMIN — SODIUM CHLORIDE 50 MILLILITER(S): 9 INJECTION, SOLUTION INTRAVENOUS at 06:18

## 2017-12-19 RX ADMIN — SEVELAMER CARBONATE 2400 MILLIGRAM(S): 2400 POWDER, FOR SUSPENSION ORAL at 18:32

## 2017-12-19 RX ADMIN — INSULIN HUMAN 0.5 UNIT(S)/HR: 100 INJECTION, SOLUTION SUBCUTANEOUS at 02:33

## 2017-12-19 RX ADMIN — CINACALCET 60 MILLIGRAM(S): 30 TABLET, FILM COATED ORAL at 12:22

## 2017-12-19 RX ADMIN — Medication 25 MILLILITER(S): at 19:37

## 2017-12-19 RX ADMIN — Medication 4 UNIT(S): at 14:45

## 2017-12-19 RX ADMIN — OXYCODONE AND ACETAMINOPHEN 1 TABLET(S): 5; 325 TABLET ORAL at 20:32

## 2017-12-19 RX ADMIN — LISINOPRIL 40 MILLIGRAM(S): 2.5 TABLET ORAL at 06:07

## 2017-12-19 RX ADMIN — DULOXETINE HYDROCHLORIDE 60 MILLIGRAM(S): 30 CAPSULE, DELAYED RELEASE ORAL at 12:23

## 2017-12-19 RX ADMIN — Medication 25 MILLIGRAM(S): at 15:12

## 2017-12-19 NOTE — CONSULT NOTE ADULT - ASSESSMENT
63 yo F with hx DM, dka esrd cad htn PAD presented with sob and chf. also with hyperglycemia and + acetone now.   1- ESRD  2- hyperkalemia   3- DKA  4- chf     cont with bipap   urgent hd arranged for fluid removal   now that elevated acetone level. MICU eval as well for DKA  endo to evaluate her DKA
62 f with  DKA- continue Insulin, endocrine follow.  ESRD- continue HD  CAD,HTN- stable.  s/p CVA- stable.  Pierce Viera MD pager [phone]
61 yo male with 40+ year h/o DM2, with multiple complications most notably ESRD on HD.  Here with ketosis in the setting of reynold recent URI.  Labs aberrant likely in setting of needing HD at time of admission as well.  Regardless, her labs are much improved following HD + insulin gtt overnight, rate currently at 0.5 units/hr.  Her pump supplies were brought in and has pump at bedside, ready to reconnect when team ready.

## 2017-12-19 NOTE — DIETITIAN INITIAL EVALUATION ADULT. - ENERGY NEEDS
Ht:63"   Wt: 121  BMI: 21.5 kg/m2   IBW: 115 (+/-10%)     within IBW  Edema:2+ generalized   Skin: no pressure injuries

## 2017-12-19 NOTE — DIETITIAN INITIAL EVALUATION ADULT. - OTHER INFO
Pt seen for: consult for HD.  Adm dx: Pt is a 62F with PMHx T1DM (on insulin pump), HTN, HLD, MI, CAD ,dCHF, chronic LLE pain and edema in setting of severe PVD, ESRD on HD (Tu/Thr/Sat) , CVA with memory deficit, who presents with shortness of breath secondary to fluid overload found to be in mild DKA.  Plan to d/c insulin drip. Per Endo pt is ready to have insulin pump reconnected.  Plan for HD today. GI issues: denies N/V/D/C   Last BM: 12/18    Food allergies: NKFA    Vit/supplement PTA: phos binder Pt seen for: consult for HD.  Adm dx: Pt is a 62F with PMHx T1DM (on insulin pump), HTN, HLD, MI, CAD ,dCHF, chronic LLE pain and edema in setting of severe PVD, ESRD on HD (Tu/Thr/Sat) , CVA with memory deficit, who presents with shortness of breath secondary to fluid overload found to be in mild DKA.  Plan to d/c insulin drip. Per Endo pt is ready to have insulin pump reconnected.  Plan for HD today. Noted pt has had multiple adm for DKA.  GI issues: denies N/V/D/C   Last BM: 12/18    Food allergies: NKFA    Vit/supplement PTA: phos binder

## 2017-12-19 NOTE — DIETITIAN INITIAL EVALUATION ADULT. - ADHERENCE
has insulin pump, carb ratio is 1:15, states that she boluses insulin depending upon what she will eat, tries for 45-60gm carbs at breakfast, varies lunch and dinner. Checks BG 4x/day, states BG has been hi past week as she wasn't feeling well, when feeling well stated they are in the hi 100s. Unsure of recent A1c level, noted A1c 8.5 11/25. Reports watching K, Na and phos intake. Declined diet review stated she has books at home on what to do.

## 2017-12-19 NOTE — CONSULT NOTE ADULT - PROBLEM SELECTOR RECOMMENDATION 9
Pt is not in DKA currently and ok from our standpoint to come off insulin gtt.   Would recommend to get her to insert a new pump site, reconnect her pump, and then overlap this with the insulin gtt by 1 hour.  Then would turn off insulin gtt and allow pt to use her pump autonomously thereafter.  She uses bolus wizard.  Will need order place for insulin pump, will need her to fill out pump papers, and she will need achs accuchecks while here via nursing.  Can stop recurrent BMPs. Pt is not in DKA currently and ok from our standpoint to come off insulin gtt.   Appears she already received lantus this AM around 10 am, so can't go back on pump today.  Would recommend today to continue with 6 untis glargine already given (may have needed closer to 8, so if starts trending up, can give 'salvage' 2 unit dose to catch her up).  Her carb ratio is 1:15 so if eating a 60 gm carb tray (not sure of the carb content in her meals ordered), would be 4 unit dose for example.  Would start JADEN but to start at  (1 unit for every 50 point rise in BG >200).    TOMORROW around 9 am, tell pt to hook back up her insulin pump and start use.  Would cancel out all of her subQ insulin orders at that time.  If she hasn't eaten breakfast yet, ok to bolus through pump, if she has already eaten, then ok to have given humalog injection prior to pump being rehooked. Pt is not in DKA currently and ok from our standpoint to come off insulin gtt.   Appears she already received lantus this AM around 10 am, so can't go back on pump today.  Would recommend today to continue with 6 untis glargine already given (may have needed closer to 8, so if starts trending up, can give 'salvage' 2 unit dose to catch her up).  Her carb ratio is 1:15 so if eating a 60 gm carb tray (not sure of the carb content in her meals ordered), would be 4 unit dose for example.  Would start JADEN but to start at  (1 unit for every 50 point rise in BG >200).    TOMORROW around 9 am, tell pt to hook back up her insulin pump and start use.  Would cancel out all of her subQ insulin orders at that time.  If she hasn't eaten breakfast yet, ok to bolus through pump, if she has already eaten, then ok to have given humalog injection prior to pump being rehooked.  WOULD ENSURE PT DOES NOT HOOK BACK UP PUMP TODAY AND KNOWS SHE CAN'T RESTART THIS UNTIL TOMORROW.

## 2017-12-19 NOTE — CONSULT NOTE ADULT - SUBJECTIVE AND OBJECTIVE BOX
[facility]  [phone]  NEPHROLOGY      INITIAL CONSULT NOTE  --------------------------------------------------------------------------------  HPI:    one of numerous admission for this pt with hx of esrd/dm/pad/chf/hyperkalemia now once again presenting with chf and hyperkalemia along with hyperglycemia. last hd 2 days ago       PAST HISTORY  --------------------------------------------------------------------------------  PAST MEDICAL & SURGICAL HISTORY:  Subclavian vein stenosis, left: s/p stent  Cellulitis: 2015 left foot  DKA, type 1: 1/2015  ACS (acute coronary syndrome): 1/2015 - cath revealed 100% ostial stenosis not amenable to PCI - medical management  MI, old  TIA (transient ischemic attack): x 2 - 8-9 years ago prior to ASD/VSD repair  CAD (coronary artery disease): s/p stents  Murmur, cardiac  Gout: past  CVA (cerebral infarction): with no residual, 8 yrs ago, prior to heart surgery - ST memory loss  Peripheral vascular disease: occluded left fem-pop bypass 5/2015  Diabetes mellitus type 1: Insulin Dependent - Medtronic  Minimed Paradigm Insulin Pump - Novolog  ESRD (end stage renal disease): dialysis , tue, thursday, saturday  Hyperlipidemia  Status post device closure of ASD: &quot;clamshell&quot;  History of cardiac catheterization: 1/2015 - no intervention  S/P femoral-popliteal bypass surgery: L and R in 2013 with graft; 5/2015 CFA angioplasty left and ileofemoral endarterectomywith vein patch angioplasty of left fem-pop bypass graft  Multiple vascular surgery both leg, left fempop bypass revision 11/2015  AV (arteriovenous fistula): Left AV graft; revision with stent placement 2-3 years ago  S/P cholecystectomy    FAMILY HISTORY:  Family history of smoking  Family history of hypertension  Family history of cancer (Sibling)    PAST SOCIAL HISTORY: remote tobacco     ALLERGIES & MEDICATIONS  --------------------------------------------------------------------------------  Allergies    No Known Allergies    Intolerances      Standing Inpatient Medications    PRN Inpatient Medications      REVIEW OF SYSTEMS  --------------------------------------------------------------------------------  Gen: No w fevers/chills, +_eakness  Skin: No rashes  Head/Eyes/Ears/Mouth: No headache; Normal hearing; decrease vision; No sinus pain/discomfort, sore throat  Respiratory: + dyspnea, +cough, -wheezing, hemoptysis  CV: No chest pain, +PND,+ orthopnea  GI: No abdominal pain, diarrhea, constipation, nausea, vomiting,   : No dysuria,   MSK: No joint pain/swelling; no back pain; no edema  Neuro: No dizziness/lightheadedness,    All other systems were reviewed and are negative, except as noted.    VITALS/PHYSICAL EXAM  --------------------------------------------------------------------------------  T(C): 37 (12-18-17 @ 21:19), Max: 37 (12-18-17 @ 21:19)  HR: 90 (12-18-17 @ 21:19) (85 - 90)  BP: 151/56 (12-18-17 @ 21:19) (151/56 - 180/90)  RR: 22 (12-18-17 @ 21:19) (18 - 22)  SpO2: 98% (12-18-17 @ 21:19) (98% - 100%)  Wt(kg): --        Physical Exam:  	Gen: on bipap and sob   	+ JVD  supple neck,   	Pulm: rales + diffuse bases   	CV: RRR, S1S2; no rub  	Back: No spinal or CVA tenderness; no sacral edema  	Abd: +BS, soft, nontender/nondistended  	: No suprapubic tenderness  	ext: Warm, no clubbing, 1+ RLE 2+ LLE edema  		Vascular access: + AVF + bruit and thrill     LABS/STUDIES  --------------------------------------------------------------------------------              10.3   9.4   >-----------<  286      [12-18-17 @ 19:46]              31.4     135  |  91  |  54  ----------------------------<  378      [12-18-17 @ 19:46]  6.8   |  22  |  7.33        Ca     8.1     [12-18-17 @ 19:46]    TPro  7.6  /  Alb  4.0  /  TBili  0.3  /  DBili  x   /  AST  26  /  ALT  9   /  AlkPhos  206  [12-18-17 @ 19:46]        Troponin 0.10      [12-18-17 @ 19:46]    Creatinine Trend:  SCr 7.33 [12-18 @ 19:46]  SCr 6.17 [11-29 @ 08:32]  SCr 5.00 [11-28 @ 07:40]  SCr 7.42 [11-27 @ 07:30]  SCr 6.27 [11-26 @ 12:31]    Urinalysis - [06-04-17 @ 08:24]      Color Yellow / Appearance Clear / SG 1.013 / pH 8.5      Gluc 1000 / Ketone Negative  / Bili Negative / Urobili Negative       Blood Negative / Protein >600 / Leuk Est Small / Nitrite Negative      RBC 3-5 / WBC 6-10 / Hyaline  / Gran  / Sq Epi  / Non Sq Epi OCC / Bacteria       HbA1c 8.5      [11-25-17 @ 04:12]    HBsAb <3.0      [11-25-17 @ 07:01]  HBsAb Nonreact      [05-02-15 @ 15:35]  HBsAg Nonreact      [11-25-17 @ 07:01]  HBcAb Nonreact      [11-25-17 @ 07:01]  HCV 0.14, Nonreact      [11-25-17 @ 07:01]
HPI:  Pt is a 62F with PMHx T1DM (on insulin pump), HTN, HLD, former smoker, MI, CAD s/p PCI to RCA and brachytherapy in Aug 2017, dCHF (last TTE- November 2017- mild-mod MR, mild AS, mild-mod TR EF40-45%) chronic LLE pain and edema in setting of severe PVD s/p L & R fem-pop bypass  graft,  multiple vascular surgery both leg w/ fem-pop bypass revisions , Subclavian vein stenosis left s/p stent, ESRD on HD (Tu/Thr/Sat) via L AVF with oliguria, CVA with memory deficit, depression who presents with shortness of breath.     The patient has multiple admissions for DKA, most recently from 11/24 - 11/29. This past week, pt states she has had a cold with congestion, cough, runny nose. Denies fevers, chills, nausea, vomiting, chest pain, diarrhea, or constipation. Denies recent travel or sick contacts, but states she has a grandson who had a URI recently. This AM she woke up acutely short of breath which prompted her to come to the ED. She states she has been compliant with her home medications and her blood glucoses recently have been in the 200s and 300s. She states her  manages her insulin pump.    In the ED, VS were T98.5 HR83 /90 SaO2 100% on room air. A CXR showed pulmonary edema; she was placed on BiPAP, given 80mg IV lasix. Labs were significant for a K+ of 6.8 so house renal was called for urgent dialysis. Lab work was also significant for a beta-hydroxybutyrate of 2.2, small serum acetone, blood glucose of 387, an anion gap of 22,  and pH of 7.3 on VBG. She was admitted to the MICU after emergent HD for further management of DKA. (18 Dec 2017 22:35)      PAST MEDICAL & SURGICAL HISTORY:  Subclavian vein stenosis, left: s/p stent  Cellulitis: 2015 left foot  DKA, type 1: 1/2015  ACS (acute coronary syndrome): 1/2015 - cath revealed 100% ostial stenosis not amenable to PCI - medical management  MI, old  TIA (transient ischemic attack): x 2 - 8-9 years ago prior to ASD/VSD repair  CAD (coronary artery disease): s/p stents  Murmur, cardiac  Gout: past  CVA (cerebral infarction): with no residual, 8 yrs ago, prior to heart surgery - ST memory loss  Peripheral vascular disease: occluded left fem-pop bypass 5/2015  Diabetes mellitus type 1: Insulin Dependent - Medtronic  Minimed Paradigm Insulin Pump - Novolog  ESRD (end stage renal disease): dialysis , tue, thursday, saturday  Hyperlipidemia  Status post device closure of ASD: &quot;clamshell&quot;  History of cardiac catheterization: 1/2015 - no intervention  S/P femoral-popliteal bypass surgery: L and R in 2013 with graft; 5/2015 CFA angioplasty left and ileofemoral endarterectomywith vein patch angioplasty of left fem-pop bypass graft  Multiple vascular surgery both leg, left fempop bypass revision 11/2015  AV (arteriovenous fistula): Left AV graft; revision with stent placement 2-3 years ago  S/P cholecystectomy      Review of Systems:   CONSTITUTIONAL: No fever, weight loss, or fatigue  EYES: No eye pain, visual disturbances, or discharge  ENMT:  No difficulty hearing, tinnitus, vertigo; No sinus or throat pain  NECK: No pain or stiffness  BREASTS: No pain, masses, or nipple discharge  RESPIRATORY: No cough, wheezing, chills or hemoptysis; No shortness of breath  CARDIOVASCULAR: No chest pain, palpitations, dizziness, or leg swelling  GASTROINTESTINAL: No abdominal or epigastric pain. No nausea, vomiting, or hematemesis; No diarrhea or constipation. No melena or hematochezia.  GENITOURINARY: No dysuria, frequency, hematuria, or incontinence  NEUROLOGICAL: No headaches, memory loss, loss of strength, numbness, or tremors  SKIN: No itching, burning, rashes, or lesions   LYMPH NODES: No enlarged glands  ENDOCRINE: No heat or cold intolerance; No hair loss  MUSCULOSKELETAL: No joint pain or swelling; No muscle, back, or extremity pain  PSYCHIATRIC: No depression, anxiety, mood swings, or difficulty sleeping  HEME/LYMPH: No easy bruising, or bleeding gums  ALLERY AND IMMUNOLOGIC: No hives or eczema    Allergies    No Known Allergies    Intolerances        Social History:     FAMILY HISTORY:  Family history of smoking  Family history of hypertension  Family history of cancer (Sibling)      MEDICATIONS  (STANDING):  aspirin enteric coated 81 milliGRAM(s) Oral daily  atorvastatin 20 milliGRAM(s) Oral at bedtime  cinacalcet 60 milliGRAM(s) Oral daily  clopidogrel Tablet 75 milliGRAM(s) Oral at bedtime  DULoxetine 60 milliGRAM(s) Oral daily  furosemide    Tablet 40 milliGRAM(s) Oral daily  heparin  Injectable 5000 Unit(s) SubCutaneous every 8 hours  hydrALAZINE 25 milliGRAM(s) Oral every 8 hours  insulin Infusion 0.5 Unit(s)/Hr (0.5 mL/Hr) IV Continuous <Continuous>  insulin lispro (HumaLOG) corrective regimen sliding scale   SubCutaneous Before meals and at bedtime  insulin lispro Injectable (HumaLOG) 4 Unit(s) SubCutaneous before breakfast  insulin lispro Injectable (HumaLOG) 4 Unit(s) SubCutaneous before lunch  insulin lispro Injectable (HumaLOG) 4 Unit(s) SubCutaneous before dinner  lisinopril 40 milliGRAM(s) Oral daily  metoprolol     tartrate 25 milliGRAM(s) Oral two times a day  sevelamer hydrochloride 2400 milliGRAM(s) Oral three times a day with meals    MEDICATIONS  (PRN):  oxyCODONE    5 mG/acetaminophen 325 mG 1 Tablet(s) Oral every 6 hours PRN Severe Pain (7 - 10)        CAPILLARY BLOOD GLUCOSE      POCT Blood Glucose.: 105 mg/dL (19 Dec 2017 14:40)  POCT Blood Glucose.: 153 mg/dL (19 Dec 2017 13:45)  POCT Blood Glucose.: 209 mg/dL (19 Dec 2017 12:13)  POCT Blood Glucose.: 270 mg/dL (19 Dec 2017 11:17)  POCT Blood Glucose.: 228 mg/dL (19 Dec 2017 10:04)  POCT Blood Glucose.: 146 mg/dL (19 Dec 2017 09:01)  POCT Blood Glucose.: 138 mg/dL (19 Dec 2017 08:05)  POCT Blood Glucose.: 144 mg/dL (19 Dec 2017 07:03)  POCT Blood Glucose.: 144 mg/dL (19 Dec 2017 06:06)  POCT Blood Glucose.: 196 mg/dL (19 Dec 2017 05:01)  POCT Blood Glucose.: 243 mg/dL (19 Dec 2017 04:01)  POCT Blood Glucose.: 230 mg/dL (19 Dec 2017 03:05)  POCT Blood Glucose.: 275 mg/dL (19 Dec 2017 02:23)  POCT Blood Glucose.: 287 mg/dL (19 Dec 2017 01:27)  POCT Blood Glucose.: 354 mg/dL (18 Dec 2017 21:15)  POCT Blood Glucose.: 387 mg/dL (18 Dec 2017 20:48)    I&O's Summary    18 Dec 2017 07:01  -  19 Dec 2017 07:00  --------------------------------------------------------  IN: 155 mL / OUT: 2000 mL / NET: -1845 mL    19 Dec 2017 07:01  -  19 Dec 2017 15:54  --------------------------------------------------------  IN: 392.5 mL / OUT: 2000 mL / NET: -1607.5 mL        PHYSICAL EXAM:  GENERAL: NAD, well-developed  HEAD:  Atraumatic, Normocephalic  EYES: EOMI, PERRLA, conjunctiva and sclera clear  NECK: Supple, No JVD  CHEST/LUNG: Clear to auscultation bilaterally; No wheeze  HEART: Regular rate and rhythm; No murmurs, rubs, or gallops  ABDOMEN: Soft, Nontender, Nondistended; Bowel sounds present  EXTREMITIES:  2+ Peripheral Pulses, No clubbing, cyanosis, or edema  PSYCH: AAOx3  NEUROLOGY: non-focal  SKIN: No rashes or lesions    LABS:                        8.9    7.8   )-----------( 259      ( 19 Dec 2017 01:45 )             27.7     12-19    136  |  93<L>  |  34<H>  ----------------------------<  256<H>  5.1   |  27  |  5.44<H>    Ca    8.4      19 Dec 2017 10:40  Phos  4.4     12-19  Mg     2.3     12-19    TPro  6.6  /  Alb  3.4  /  TBili  0.4  /  DBili  x   /  AST  11  /  ALT  8<L>  /  AlkPhos  180<H>  12-19    PT/INR - ( 19 Dec 2017 01:45 )   PT: 11.0 sec;   INR: 1.01 ratio         PTT - ( 19 Dec 2017 01:45 )  PTT:28.8 sec  CARDIAC MARKERS ( 19 Dec 2017 10:40 )  x     / 0.12 ng/mL / x     / x     / x      CARDIAC MARKERS ( 19 Dec 2017 01:45 )  x     / 0.11 ng/mL / 102 U/L / x     / 2.2 ng/mL  CARDIAC MARKERS ( 18 Dec 2017 19:46 )  x     / 0.10 ng/mL / x     / x     / x              RADIOLOGY & ADDITIONAL TESTS:    Imaging Personally Reviewed:    Consultant(s) Notes Reviewed:      Care Discussed with Consultants/Other Providers:
HPI:  Pt is a 62F with PMHx T1DM (on insulin pump), HTN, HLD, former smoker, MI, CAD s/p PCI to RCA and brachytherapy in Aug 2017, dCHF (last TTE- November 2017- mild-mod MR, mild AS, mild-mod TR EF40-45%) chronic LLE pain and edema in setting of severe PVD s/p L & R fem-pop bypass  graft,  multiple vascular surgery both leg w/ fem-pop bypass revisions , Subclavian vein stenosis left s/p stent, ESRD on HD (Tu/Thr/Sat) via L AVF with oliguria, CVA, depression who presents with shortness of breath.     The patient has multiple admissions for DKA, most recently from 11/24 - 11/29. She notes that her grandson was sick wtih URI and she thinks she caught it; has cough currently.  Sugars started rising but denied issues with non adherence or with pump site malfunction.  She arrived with K 6.8 and was taken for HD.  She had midlly elevated BOHB, elevated anion gap but in setting of needing hd and pH of 7.3 wtih normal serum bicarb (likely more consistent with ketosis than ketoacidosis at that point, wtih labs abnormal in setting of needing HD).  Regardless, was admitted to MICU and started on insulin gtt with sugars correcting rapidly and currently is off her pump (pulled out site on arrival).  She has negative serum ketones this AM BOHB and acetone.     She states she is feeling much better.  Has h/o DM1 for 40 years, currently on medtronic 630G pump with humalog insulin with following settings.  She is well known to our team for frequent admissions.  Denies recent issues wtih low BG at home (has had these inpt previulsy though).  Has recurrent DKA historically.  DM is complicated by ESRD, neuropathy, retinopathy. She has macrovascular complications of CAD and PVD.  Patient follows with Dr Av brand. Uses bolus wizard. Checks glucose 4 x/day with MenoGeniX Contour glucometer.     Pump Settings:  Basal: 12am-0.275  5am-0.375  TDD basal 8.5 units    ICR: 1: 15  ISF: 12am-60 7am: 40 6pm: 60  BG Target: 12am: 110-130 8am: 100-120   Active insulin: 6 hours    PAST MEDICAL & SURGICAL HISTORY:  Subclavian vein stenosis, left: s/p stent  Cellulitis: 2015 left foot  DKA, type 1: 1/2015  ACS (acute coronary syndrome): 1/2015 - cath revealed 100% ostial stenosis not amenable to PCI - medical management  MI, old  TIA (transient ischemic attack): x 2 - 8-9 years ago prior to ASD/VSD repair  CAD (coronary artery disease): s/p stents  Murmur, cardiac  Gout: past  CVA (cerebral infarction): with no residual, 8 yrs ago, prior to heart surgery - ST memory loss  Peripheral vascular disease: occluded left fem-pop bypass 5/2015  Diabetes mellitus type 1: Insulin Dependent - Medtronic  Minimed Paradigm Insulin Pump - Novolog  ESRD (end stage renal disease): dialysis , tue, thursday, saturday  Hyperlipidemia  Status post device closure of ASD: &quot;clamshell&quot;  History of cardiac catheterization: 1/2015 - no intervention  S/P femoral-popliteal bypass surgery: L and R in 2013 with graft; 5/2015 CFA angioplasty left and ileofemoral endarterectomywith vein patch angioplasty of left fem-pop bypass graft  Multiple vascular surgery both leg, left fempop bypass revision 11/2015  AV (arteriovenous fistula): Left AV graft; revision with stent placement 2-3 years ago  S/P cholecystectomy      FAMILY HISTORY:  Family history of smoking  Family history of hypertension  Family history of cancer (Sibling)      Social History: lives with      Outpatient Medications:    MEDICATIONS  (STANDING):  aspirin enteric coated 81 milliGRAM(s) Oral daily  atorvastatin 20 milliGRAM(s) Oral at bedtime  cinacalcet 60 milliGRAM(s) Oral daily  clopidogrel Tablet 75 milliGRAM(s) Oral at bedtime  DULoxetine 60 milliGRAM(s) Oral daily  furosemide    Tablet 40 milliGRAM(s) Oral daily  heparin  Injectable 5000 Unit(s) SubCutaneous every 8 hours  hydrALAZINE 25 milliGRAM(s) Oral every 8 hours  insulin glargine Injectable (LANTUS) 6 Unit(s) SubCutaneous every morning  insulin Infusion 0.5 Unit(s)/Hr (0.5 mL/Hr) IV Continuous <Continuous>  insulin lispro (HumaLOG) corrective regimen sliding scale   SubCutaneous three times a day before meals  lisinopril 40 milliGRAM(s) Oral daily  metoprolol     tartrate 25 milliGRAM(s) Oral two times a day  sevelamer hydrochloride 2400 milliGRAM(s) Oral three times a day with meals    MEDICATIONS  (PRN):      Allergies    No Known Allergies    Intolerances      Review of Systems:  Constitutional: No fever  Neuro: No tremors  HEENT: No pain  Cardiovascular: No chest pain, palpitations  Respiratory: +SOB, +cough  GI: No nausea, vomiting, abdominal pain  : No dysuria  Skin: no rash  Psych: no depression  Endocrine: no polyuria, polydipsia  Hem/lymph: no swelling    ALL OTHER SYSTEMS REVIEWED AND NEGATIVE    PHYSICAL EXAM:  VITALS: T(C): 36.7 (12-19-17 @ 08:00)  T(F): 98.1 (12-19-17 @ 08:00), Max: 99.9 (12-19-17 @ 01:22)  HR: 75 (12-19-17 @ 10:00) (75 - 95)  BP: 164/74 (12-19-17 @ 09:00) (135/61 - 180/90)  RR:  (16 - 26)  SpO2:  (93% - 100%)  Wt(kg): --  GENERAL: NAD, well-developed  EYES: No proptosis, no lid lag, anicteric  HEENT:  Atraumatic, Normocephalic, moist mucous membranes  RESPIRATORY: breathing comfortably in chair, no accessory muscle use or costal retractions  CARDIOVASCULAR: warm and well perfused, no peripheral edema  GI: non distended, normal bowel sounds  SKIN: Dry, intact, No rashes   MUSCULOSKELETAL: Full range of motion, no lordosis   NEURO: speech fluent, face symmetric   PSYCH: Alert and oriented x 3, flat affect    POCT Blood Glucose.: 228 mg/dL (12-19-17 @ 10:04)  POCT Blood Glucose.: 146 mg/dL (12-19-17 @ 09:01)  POCT Blood Glucose.: 138 mg/dL (12-19-17 @ 08:05)  POCT Blood Glucose.: 144 mg/dL (12-19-17 @ 07:03)  POCT Blood Glucose.: 144 mg/dL (12-19-17 @ 06:06)  POCT Blood Glucose.: 196 mg/dL (12-19-17 @ 05:01)  POCT Blood Glucose.: 243 mg/dL (12-19-17 @ 04:01)  POCT Blood Glucose.: 230 mg/dL (12-19-17 @ 03:05)  POCT Blood Glucose.: 275 mg/dL (12-19-17 @ 02:23)  POCT Blood Glucose.: 287 mg/dL (12-19-17 @ 01:27)  POCT Blood Glucose.: 354 mg/dL (12-18-17 @ 21:15)  POCT Blood Glucose.: 387 mg/dL (12-18-17 @ 20:48)                            8.9    7.8   )-----------( 259      ( 19 Dec 2017 01:45 )             27.7       12-19    140  |  97  |  31<H>  ----------------------------<  160<H>  4.5   |  30  |  5.24<H>    EGFR if : 10<L>  EGFR if non : 8<L>    Ca    8.2<L>      12-19  Mg     2.4     12-19  Phos  4.0     12-19    TPro  6.6  /  Alb  3.4  /  TBili  0.4  /  DBili  x   /  AST  11  /  ALT  8<L>  /  AlkPhos  180<H>  12-19      Thyroid Function Tests:  12-19 @ 06:12 TSH 1.07 FreeT4 -- T3 -- Anti TPO -- Anti Thyroglobulin Ab -- TSI --      Hemoglobin A1C, Whole Blood: 8.5 % <H> [4.0 - 5.6] (11-25-17 @ 04:12)          Radiology:

## 2017-12-19 NOTE — PROGRESS NOTE ADULT - SUBJECTIVE AND OBJECTIVE BOX
[facility]   [phone]  RENAL FOLLOW UP NOTE  --------------------------------------------------------------------------------  Chief Complaint:    24 hour events/subjective:    still with some sob per pt     PAST HISTORY  --------------------------------------------------------------------------------  No significant changes to PMH, PSH, FHx, SHx, unless otherwise noted    ALLERGIES & MEDICATIONS  --------------------------------------------------------------------------------  Allergies    No Known Allergies    Intolerances      Standing Inpatient Medications  aspirin enteric coated 81 milliGRAM(s) Oral daily  atorvastatin 20 milliGRAM(s) Oral at bedtime  cinacalcet 60 milliGRAM(s) Oral daily  clopidogrel Tablet 75 milliGRAM(s) Oral at bedtime  DULoxetine 60 milliGRAM(s) Oral daily  furosemide    Tablet 40 milliGRAM(s) Oral daily  heparin  Injectable 5000 Unit(s) SubCutaneous every 8 hours  hydrALAZINE 25 milliGRAM(s) Oral every 8 hours  insulin glargine Injectable (LANTUS) 6 Unit(s) SubCutaneous every morning  insulin Infusion 0.5 Unit(s)/Hr IV Continuous <Continuous>  insulin lispro (HumaLOG) corrective regimen sliding scale   SubCutaneous three times a day before meals  lisinopril 40 milliGRAM(s) Oral daily  metoprolol     tartrate 25 milliGRAM(s) Oral two times a day  sevelamer hydrochloride 2400 milliGRAM(s) Oral three times a day with meals    PRN Inpatient Medications      REVIEW OF SYSTEMS  --------------------------------------------------------------------------------    Gen: denies  fevers/chills,  CVS: denies chest pain/palpitations  Resp: + SOB/- Cough  GI: Denies N/V/Abd pain  : Denies dysuria  All other systems were reviewed and are negative, except as noted.    VITALS/PHYSICAL EXAM  --------------------------------------------------------------------------------  T(C): 36.7 (12-19-17 @ 08:00), Max: 37.7 (12-19-17 @ 01:22)  HR: 75 (12-19-17 @ 09:00) (75 - 95)  BP: 156/71 (12-19-17 @ 08:00) (135/61 - 180/90)  RR: 16 (12-19-17 @ 09:00) (16 - 26)  SpO2: 94% (12-19-17 @ 09:00) (93% - 100%)  Wt(kg): --  Height (cm): 160 (12-19-17 @ 01:53)  Weight (kg): 55.1 (12-19-17 @ 01:22)  BMI (kg/m2): 21.5 (12-19-17 @ 01:53)  BSA (m2): 1.56 (12-19-17 @ 01:53)      12-18-17 @ 07:01  -  12-19-17 @ 07:00  --------------------------------------------------------  IN: 155 mL / OUT: 2000 mL / NET: -1845 mL        	    Physical Exam:  	Gen: alert oriented place person and date   	Pulm: crackles left base  rales or ronchi or wheezing  	CV: RRR, S1/S2. no rub  	Back: No CVA tenderness; no sacral edema  	Abd: +BS, soft, nontender/nondistended  	: No suprapubic tenderness.               Extremity: No cyanosis, no edema no clubbing  	    LABS/STUDIES  --------------------------------------------------------------------------------              8.9    7.8   >-----------<  259      [12-19-17 @ 01:45]              27.7     140  |  97  |  31  ----------------------------<  160      [12-19-17 @ 06:10]  4.5   |  30  |  5.24        Ca     8.2     [12-19-17 @ 06:10]      Mg     2.4     [12-19-17 @ 06:10]      Phos  4.0     [12-19-17 @ 06:10]    TPro  6.6  /  Alb  3.4  /  TBili  0.4  /  DBili  x   /  AST  11  /  ALT  8   /  AlkPhos  180  [12-19-17 @ 01:45]    PT/INR: PT 11.0 , INR 1.01       [12-19-17 @ 01:45]  PTT: 28.8       [12-19-17 @ 01:45]    Troponin 0.11      [12-19-17 @ 01:45]        [12-19-17 @ 01:45]    Creatinine Trend:  SCr 5.24 [12-19 @ 06:10]  SCr 4.49 [12-19 @ 01:45]  SCr 7.33 [12-18 @ 19:46]  SCr 6.17 [11-29 @ 08:32]  SCr 5.00 [11-28 @ 07:40]                  HbA1c 8.5      [11-25-17 @ 04:12]  TSH 1.07      [12-19-17 @ 06:12]

## 2017-12-19 NOTE — CONSULT NOTE ADULT - PROBLEM SELECTOR RECOMMENDATION 2
Pt s/p HD overnight with labs much improved. Continue routine HD and try to limit volume overload while balancing keeping pt well hydrated.

## 2017-12-19 NOTE — CONSULT NOTE ADULT - PROBLEM SELECTOR RECOMMENDATION 3
no changes to settings today.  Has been on stable settings for months, but with recent URI, explained that she feels this caused her sugasr to rise (which is certainly possible).

## 2017-12-19 NOTE — CHART NOTE - NSCHARTNOTEFT_GEN_A_CORE
MICU Transfer Note    Transfer from: MICU    Transfer to: (X) Medicine    (  ) Telemetry     (   ) RCU        (    ) Palliative         (   ) Stroke Unit          (   ) __________________    Accepting physican:    MICU COURSE:    62F PMH T1DM (on insulin pump), HTN, HLD, former smoker, MI, CAD s/p PCI to RCA and brachytherapy in Aug 2017, dCHF (last TTE- November 2017- mild-mod MR, mild AS, mild-mod TR EF40-45%) chronic LLE pain and edema in setting of severe PVD s/p L & R fem-pop bypass  graft,  multiple vascular surgery both leg w/ fem-pop bypass revisions , Subclavian vein stenosis left s/p stent, ESRD on HD (Tu/Thr/Sat) via L AVF with oliguria, CVA with memory deficit, depression who presented with SOB.    Patient has multiple admissions for DKA, most recently from 11/24 - 11/29. One week prior to admission, has had URI symptoms and congestion, but no F/C, N/V/D, CP. Grandson with URI recently. The morning of admission, woke up acutely SOB. States she has been compliant with her home medications, however FS have been 200s-300s recently.  This AM she woke up acutely short of breath which prompted her to come to the ED. She states she has been compliant with her home medications and her blood glucoses recently have been in the 200s and 300s. Her  manages her insulin pump.    In ED, she was hypertensive. Other VS were WNL. CXR showed mild pulmonary edema so she was initially placed on BIPAP and given lasix 80mg IVP x 1. She was also hyperkalemic to 6.8. Urgent HD was performed overnight in the ICU. Labs were also significant for mild DKAwith betahydroxybutyrate of 2.2, small acetone, and AG 22. She was never acidemic. The morning following ICU admission she was transitioned to Lantus 6U and the insulin gtt was shut off around 10:30 AM. She was resumed on a basal/bolus regimen.     Endocrinology consults are pending, sd        ASSESSMENT & PLAN:             For Followup:          Vital Signs Last 24 Hrs  T(C): 36.7 (19 Dec 2017 08:00), Max: 37.7 (19 Dec 2017 01:22)  T(F): 98.1 (19 Dec 2017 08:00), Max: 99.9 (19 Dec 2017 01:22)  HR: 75 (19 Dec 2017 09:00) (75 - 95)  BP: 156/71 (19 Dec 2017 08:00) (135/61 - 180/90)  BP(mean): 102 (19 Dec 2017 08:00) (88 - 102)  RR: 16 (19 Dec 2017 09:00) (16 - 26)  SpO2: 94% (19 Dec 2017 09:00) (93% - 100%)  I&O's Summary    18 Dec 2017 07:01  -  19 Dec 2017 07:00  --------------------------------------------------------  IN: 155 mL / OUT: 2000 mL / NET: -1845 mL        MEDICATIONS  (STANDING):  aspirin enteric coated 81 milliGRAM(s) Oral daily  atorvastatin 20 milliGRAM(s) Oral at bedtime  cinacalcet 60 milliGRAM(s) Oral daily  clopidogrel Tablet 75 milliGRAM(s) Oral at bedtime  DULoxetine 60 milliGRAM(s) Oral daily  furosemide    Tablet 40 milliGRAM(s) Oral daily  heparin  Injectable 5000 Unit(s) SubCutaneous every 8 hours  hydrALAZINE 25 milliGRAM(s) Oral every 8 hours  insulin glargine Injectable (LANTUS) 6 Unit(s) SubCutaneous every morning  insulin Infusion 0.5 Unit(s)/Hr (0.5 mL/Hr) IV Continuous <Continuous>  insulin lispro (HumaLOG) corrective regimen sliding scale   SubCutaneous three times a day before meals  lisinopril 40 milliGRAM(s) Oral daily  metoprolol     tartrate 25 milliGRAM(s) Oral two times a day  sevelamer hydrochloride 2400 milliGRAM(s) Oral three times a day with meals    MEDICATIONS  (PRN):        LABS                                            8.9                   Neurophils% (auto):   x      (12-19 @ 01:45):    7.8  )-----------(259          Lymphocytes% (auto):  x                                             27.7                   Eosinphils% (auto):   x        Manual%: Neutrophils x    ; Lymphocytes x    ; Eosinophils x    ; Bands%: x    ; Blasts x                                    140    |  97     |  31                  Calcium: 8.2   / iCa: x      (12-19 @ 06:10)    ----------------------------<  160       Magnesium: 2.4                              4.5     |  30     |  5.24             Phosphorous: 4.0      TPro  6.6    /  Alb  3.4    /  TBili  0.4    /  DBili  x      /  AST  11     /  ALT  8      /  AlkPhos  180    19 Dec 2017 01:45    ( 12-19 @ 01:45 )   PT: 11.0 sec;   INR: 1.01 ratio  aPTT: 28.8 sec MICU Transfer Note    Transfer from: MICU    Transfer to: (X) Medicine    (  ) Telemetry     (   ) RCU        (    ) Palliative         (   ) Stroke Unit          (   ) __________________    Accepting physican:    MICU COURSE:    62F PMH T1DM (on insulin pump), HTN, HLD, former smoker, MI, CAD s/p PCI to RCA and brachytherapy in Aug 2017, dCHF (last TTE- November 2017- mild-mod MR, mild AS, mild-mod TR EF40-45%) chronic LLE pain and edema in setting of severe PVD s/p L & R fem-pop bypass  graft,  multiple vascular surgery both leg w/ fem-pop bypass revisions , Subclavian vein stenosis left s/p stent, ESRD on HD (Tu/Thr/Sat) via L AVF with oliguria, CVA with memory deficit, depression who presented with SOB.    Patient has multiple admissions for DKA, most recently from 11/24 - 11/29. One week prior to admission, has had URI symptoms and congestion, but no F/C, N/V/D, CP. Grandson with URI recently. The morning of admission, woke up acutely SOB. States she has been compliant with her home medications, however FS have been 200s-300s recently.  This AM she woke up acutely short of breath which prompted her to come to the ED. She states she has been compliant with her home medications and her blood glucoses recently have been in the 200s and 300s. Her  manages her insulin pump.    In ED, she was hypertensive. Other VS were WNL. CXR showed mild pulmonary edema so she was initially placed on BIPAP and given lasix 80mg IVP x 1. She was also hyperkalemic to 6.8. Urgent HD was performed overnight in the ICU. Bipap able to be transitioned off quickly. Labs were also significant for mild DKA with betahydroxybutyrate of 2.2, small acetone, and AG 22. She was never acidemic. The morning following ICU admission she was transitioned to Lantus 6U and the insulin gtt was shut off around 10:30 AM. She was resumed on a basal/bolus regimen.     Endocrinology consult placed by the ED for assistance with transition back to insulin pump. Renal following and plans to dialyze again later today.       ASSESSMENT:    62F PMH T1DM (on insulin pump), HTN, HLD, MI, CAD s/p PCI to RCA and brachytherapy in Aug 2017, HFpEF (last TTE- Nov 2017- mild-mod MR, mild AS, moderate pulmHTN EF 40-45%) chronic LLE pain and edema in setting of severe PVD s/p L & R fem-pop bypass  graft, ESRD on HD (Tu/Thr/Sat) via L AVF with oliguria, CVA with memory deficit, p/w SOB, admitted with pulmonary edema due to volume overload and mild DKA. Now improved after volume removal with HD. DKA resolved with insulin gtt.     PLAN:  #Neuro  --at baseline AO x 3, no issues. C/w atorvastatin for h/o CVA.     #Pulmonary  --SOB due to pulmonary edema, now improved with HD on nasal cannula 2L. Additional volume removal with HD later today.   --RVP negative    #Cardiac  --acute on chronic HFrEF, pulmonary edema improving with HD  --c/w home lisinopril, hydralazine for HTN  --c/w metoprolol 25mg BID  --c/w home ASA/plavix for CAD    #Endo  --mild DKA on admission s/p insulin gtt, now being transitioned basal/bolus after AG has closed.   --endocrinology consult placed, will follow up recs for restarting insulin pum;  --c/w Lantus 6U in AM and HISS for now.     #Heme  -anemic on presentation, stable. At baseline per previous hospital records  -follow CBC    #ID  -has URI sx on admission, now improved  -afebrile, no leukocytosis, RVP negative  -cont to monitor off abx    #Renal  -has ESRD, on dialysis Tu/Thr/Sat. Now s/p urgent HD for hyperkalemia, fluid overload  -renal following, appreciate recs  -Trend BMP, monitor UOP  -c/w home sensipar, renagel    #FEN/GI  -NPO in accordance with DKA protocol    #DVT ppx  -heparin subQ    #GOC  Full code          For Followup:          MEDICATIONS  (STANDING):  aspirin enteric coated 81 milliGRAM(s) Oral daily  atorvastatin 20 milliGRAM(s) Oral at bedtime  cinacalcet 60 milliGRAM(s) Oral daily  clopidogrel Tablet 75 milliGRAM(s) Oral at bedtime  DULoxetine 60 milliGRAM(s) Oral daily  furosemide    Tablet 40 milliGRAM(s) Oral daily  heparin  Injectable 5000 Unit(s) SubCutaneous every 8 hours  hydrALAZINE 25 milliGRAM(s) Oral every 8 hours  insulin glargine Injectable (LANTUS) 6 Unit(s) SubCutaneous every morning  insulin Infusion 0.5 Unit(s)/Hr (0.5 mL/Hr) IV Continuous <Continuous>  insulin lispro (HumaLOG) corrective regimen sliding scale   SubCutaneous three times a day before meals  lisinopril 40 milliGRAM(s) Oral daily  metoprolol     tartrate 25 milliGRAM(s) Oral two times a day  sevelamer hydrochloride 2400 milliGRAM(s) Oral three times a day with meals MICU Transfer Note    Transfer from: MICU    Transfer to: (X) Medicine    (  ) Telemetry     (   ) RCU        (    ) Palliative         (   ) Stroke Unit          (   ) __________________    Accepting physican:    MICU COURSE:    62F PMH T1DM (on insulin pump), HTN, HLD, former smoker, MI, CAD s/p PCI to RCA and brachytherapy in Aug 2017, dCHF (last TTE- November 2017- mild-mod MR, mild AS, mild-mod TR EF40-45%) chronic LLE pain and edema in setting of severe PVD s/p L & R fem-pop bypass  graft,  multiple vascular surgery both leg w/ fem-pop bypass revisions , Subclavian vein stenosis left s/p stent, ESRD on HD (Tu/Thr/Sat) via L AVF with oliguria, CVA with memory deficit, depression who presented with SOB.    Patient has multiple admissions for DKA, most recently from 11/24 - 11/29. One week prior to admission, has had URI symptoms and congestion, but no F/C, N/V/D, CP. Grandson with URI recently. The morning of admission, woke up acutely SOB. States she has been compliant with her home medications, however FS have been 200s-300s recently.  This AM she woke up acutely short of breath which prompted her to come to the ED. She states she has been compliant with her home medications and her blood glucoses recently have been in the 200s and 300s. Her  manages her insulin pump.    In ED, she was hypertensive. Other VS were WNL. CXR showed mild pulmonary edema so she was initially placed on BIPAP and given lasix 80mg IVP x 1. She was also hyperkalemic to 6.8. Urgent HD was performed overnight in the ICU. Bipap able to be transitioned off quickly. Labs were also significant for mild DKA with betahydroxybutyrate of 2.2, small acetone, and AG 22. She was never acidemic. The morning following ICU admission she was transitioned to Lantus 6U and the insulin gtt was shut off around 10:30 AM. She was resumed on a basal/bolus regimen.     Endocrinology consult placed by the ED for assistance with transition back to insulin pump. Renal following and plans to dialyze again later today.       ASSESSMENT:    62F PMH T1DM (on insulin pump), HTN, HLD, MI, CAD s/p PCI to RCA and brachytherapy in Aug 2017, HFpEF (last TTE- Nov 2017- mild-mod MR, mild AS, moderate pulmHTN EF 40-45%) chronic LLE pain and edema in setting of severe PVD s/p L & R fem-pop bypass  graft, ESRD on HD (Tu/Thr/Sat) via L AVF with oliguria, CVA with memory deficit, p/w SOB, admitted with pulmonary edema due to volume overload and mild DKA. Now improved after volume removal with HD. DKA resolved with insulin gtt.     PLAN:  #Neuro  --at baseline AO x 3, no issues. C/w atorvastatin for h/o CVA.     #Pulmonary  --SOB due to pulmonary edema, now improved with HD on nasal cannula 2L. Additional volume removal with HD later today.   --RVP negative    #Cardiac  --acute on chronic HFrEF, pulmonary edema improving with HD  --c/w home lisinopril, hydralazine for HTN  --c/w metoprolol 25mg BID  --c/w home ASA/plavix for CAD    #Endo  --mild DKA on admission s/p insulin gtt, now being transitioned basal/bolus after AG has closed.   --endocrinology consult placed, will follow up recs for restarting insulin pum;  --c/w Lantus 6U in AM and HISS for now.     #Heme  --chronic anemia 2/2 ESRD, at baseline.     #ID  --URI symptoms on admission, improving. Afebrile and without leukocytosis. RVP negative Continue to monitor off antibiotics.     #Renal  --has ESRD, on dialysis Tu/Thr/Sat. Now s/p urgent HD for hyperkalemia, fluid overload.   --renal to dialyze again later today  --c/w home sensipar, renagel    #FEN/GI  --consistent carb diet    #DVT ppx  --HSQ    #GOC  Full code      For Followup:  [ ] titrate basal/bolus insulin regimen  [ ] endocrinology recs regarding restarting insulin pump  [ ] renal for HD  [ ] PT consult    Maru Wyman MD  PGY 3  MICU [id number] MICU Transfer Note    Transfer from: MICU    Transfer to: (X) Medicine    (  ) Telemetry     (   ) RCU        (    ) Palliative         (   ) Stroke Unit          (   ) __________________    Accepting physican:    MICU COURSE:    62F PMH T1DM (on insulin pump), HTN, HLD, former smoker, MI, CAD s/p PCI to RCA and brachytherapy in Aug 2017, dCHF (last TTE- November 2017- mild-mod MR, mild AS, mild-mod TR EF40-45%) chronic LLE pain and edema in setting of severe PVD s/p L & R fem-pop bypass  graft,  multiple vascular surgery both leg w/ fem-pop bypass revisions , Subclavian vein stenosis left s/p stent, ESRD on HD (Tu/Thr/Sat) via L AVF with oliguria, CVA with memory deficit, depression who presented with SOB.    Patient has multiple admissions for DKA, most recently from 11/24 - 11/29. One week prior to admission, has had URI symptoms and congestion, but no F/C, N/V/D, CP. Grandson with URI recently. The morning of admission, woke up acutely SOB. States she has been compliant with her home medications, however FS have been 200s-300s recently.  This AM she woke up acutely short of breath which prompted her to come to the ED. She states she has been compliant with her home medications and her blood glucoses recently have been in the 200s and 300s. Her  manages her insulin pump.    In ED, she was hypertensive. Other VS were WNL. CXR showed mild pulmonary edema so she was initially placed on BIPAP and given lasix 80mg IVP x 1. She was also hyperkalemic to 6.8. Urgent HD was performed overnight in the ICU. Bipap able to be transitioned off quickly. Labs were also significant for mild DKA with betahydroxybutyrate of 2.2, small acetone, and AG 22. She was never acidemic. The morning following ICU admission she was transitioned to Lantus 6U and the insulin gtt was shut off around 10:30 AM. She was resumed on a basal/bolus regimen.     Endocrinology consult placed with plans to restart pump tomorrow morning 12/20 at approximately 9AM. Renal following and plans to dialyze again later today.       ASSESSMENT:    62F PMH T1DM (on insulin pump), HTN, HLD, MI, CAD s/p PCI to RCA and brachytherapy in Aug 2017, HFpEF (last TTE- Nov 2017- mild-mod MR, mild AS, moderate pulmHTN EF 40-45%) chronic LLE pain and edema in setting of severe PVD s/p L & R fem-pop bypass  graft, ESRD on HD (Tu/Thr/Sat) via L AVF with oliguria, CVA with memory deficit, p/w SOB, admitted with pulmonary edema due to volume overload and mild DKA. Now improved after volume removal with HD. DKA resolved with insulin gtt.     PLAN:  #Neuro  --at baseline AO x 3, no issues. C/w atorvastatin for h/o CVA.     #Pulmonary  --SOB due to pulmonary edema, now improved with HD on nasal cannula 2L. Additional volume removal with HD later today.   --RVP negative    #Cardiac  --acute on chronic HFrEF, pulmonary edema improving with HD  --c/w home lisinopril, hydralazine for HTN  --c/w metoprolol 25mg BID  --c/w home ASA/plavix for CAD    #Endo  --mild DKA on admission s/p insulin gtt, now being transitioned basal/bolus after AG has closed.   --endocrinology consult placed, will follow up recs for restarting insulin pum;  --c/w Lantus 6U once this morning, then Humalog 4U with meals and low dose, patient specific HISS.     #Heme  --chronic anemia 2/2 ESRD, at baseline.     #ID  --URI symptoms on admission, improving. Afebrile and without leukocytosis. RVP negative Continue to monitor off antibiotics.     #Renal  --has ESRD, on dialysis Tu/Thr/Sat. Now s/p urgent HD for hyperkalemia, fluid overload.   --renal to dialyze again later today  --c/w home sensipar, renagel    #FEN/GI  --consistent carb diet    #DVT ppx  --HSQ    #GOC  Full code      For Followup:  [ ] endocrinology recs regarding restarting insulin pump - to be restarted at 9AM 12/20  [ ] renal for HD  [ ] PT consult    Maru Wyman MD  PGY 3  MICU x12 MICU Transfer Note    Transfer from: MICU    Transfer to: (X) Medicine    (  ) Telemetry     (   ) RCU        (    ) Palliative         (   ) Stroke Unit          (   ) __________________    Accepting physican: Dr. Viera    MICU COURSE:    62F PMH T1DM (on insulin pump), HTN, HLD, former smoker, MI, CAD s/p PCI to RCA and brachytherapy in Aug 2017, dCHF (last TTE- November 2017- mild-mod MR, mild AS, mild-mod TR EF40-45%) chronic LLE pain and edema in setting of severe PVD s/p L & R fem-pop bypass  graft,  multiple vascular surgery both leg w/ fem-pop bypass revisions , Subclavian vein stenosis left s/p stent, ESRD on HD (Tu/Thr/Sat) via L AVF with oliguria, CVA with memory deficit, depression who presented with SOB.    Patient has multiple admissions for DKA, most recently from 11/24 - 11/29. One week prior to admission, has had URI symptoms and congestion, but no F/C, N/V/D, CP. Grandson with URI recently. The morning of admission, woke up acutely SOB. States she has been compliant with her home medications, however FS have been 200s-300s recently.  This AM she woke up acutely short of breath which prompted her to come to the ED. She states she has been compliant with her home medications and her blood glucoses recently have been in the 200s and 300s. Her  manages her insulin pump.    In ED, she was hypertensive. Other VS were WNL. CXR showed mild pulmonary edema so she was initially placed on BIPAP and given lasix 80mg IVP x 1. She was also hyperkalemic to 6.8. Urgent HD was performed overnight in the ICU. Bipap able to be transitioned off quickly. Labs were also significant for mild DKA with betahydroxybutyrate of 2.2, small acetone, and AG 22. She was never acidemic. The morning following ICU admission she was transitioned to Lantus 6U and the insulin gtt was shut off around 10:30 AM. She was resumed on a basal/bolus regimen.     Endocrinology consult placed with plans to restart pump tomorrow morning 12/20 at approximately 9AM. Renal following and plans to dialyze again later today.       ASSESSMENT:    62F PMH T1DM (on insulin pump), HTN, HLD, MI, CAD s/p PCI to RCA and brachytherapy in Aug 2017, HFpEF (last TTE- Nov 2017- mild-mod MR, mild AS, moderate pulmHTN EF 40-45%) chronic LLE pain and edema in setting of severe PVD s/p L & R fem-pop bypass  graft, ESRD on HD (Tu/Thr/Sat) via L AVF with oliguria, CVA with memory deficit, p/w SOB, admitted with pulmonary edema due to volume overload and mild DKA. Now improved after volume removal with HD. DKA resolved with insulin gtt.     PLAN:  #Neuro  --at baseline AO x 3, no issues. C/w atorvastatin for h/o CVA.     #Pulmonary  --SOB due to pulmonary edema, now improved with HD on nasal cannula 2L. Additional volume removal with HD later today.   --RVP negative    #Cardiac  --acute on chronic HFrEF, pulmonary edema improving with HD  --c/w home lisinopril, hydralazine for HTN  --c/w metoprolol 25mg BID  --c/w home ASA/plavix for CAD    #Endo  --mild DKA on admission s/p insulin gtt, now being transitioned basal/bolus after AG has closed.   --endocrinology consult placed, will follow up recs for restarting insulin pum;  --c/w Lantus 6U once this morning, then Humalog 4U with meals and low dose, patient specific HISS.     #Heme  --chronic anemia 2/2 ESRD, at baseline.     #ID  --URI symptoms on admission, improving. Afebrile and without leukocytosis. RVP negative Continue to monitor off antibiotics.     #Renal  --has ESRD, on dialysis Tu/Thr/Sat. Now s/p urgent HD for hyperkalemia, fluid overload.   --renal to dialyze again later today  --c/w home sensipar, renagel    #FEN/GI  --consistent carb diet    #DVT ppx  --HSQ    #GOC  Full code      For Followup:  [ ] endocrinology recs regarding restarting insulin pump - to be restarted at 9AM 12/20  [ ] renal for HD  [ ] PT consult    Maru Wyman MD  PGY 3  MICU [id number]

## 2017-12-19 NOTE — PROGRESS NOTE ADULT - SUBJECTIVE AND OBJECTIVE BOX
CHIEF COMPLAINT: Shortness of breath    Interval Events: no overnight events. This AM pt denies F/C/N/V, CP/SOB, abd pain.    REVIEW OF SYSTEMS:  Constitutional: [x  ] negative [ ] fevers [ ] chills [ ] weight loss [ ] weight gain  HEENT: [ x ] negative [ ] dry eyes [ ] eye irritation [ ] postnasal drip [ ] nasal congestion  CV: [ x ] negative  [ ] chest pain [ ] orthopnea [ ] palpitations [ ] murmur  Resp: [ x ] negative [ ] cough [ ] shortness of breath [ ] dyspnea [ ] wheezing [ ] sputum [ ] hemoptysis  GI: [ x ] negative [ ] nausea [ ] vomiting [ ] diarrhea [ ] constipation [ ] abd pain [ ] dysphagia   : [ x ] negative [ ] dysuria [ ] nocturia [ ] hematuria [ ] increased urinary frequency  Musculoskeletal: [ x ] negative [ ] back pain [ ] myalgias [ ] arthralgias [ ] fracture  Skin: [ x ] negative [ ] rash [ ] itch  Neurological: [ x ] negative [ ] headache [ ] dizziness [ ] syncope [ ] weakness [ ] numbness  Psychiatric: [ x ] negative [ ] anxiety [ ] depression  Endocrine: [ x ] negative [ ] diabetes [ ] thyroid problem  Hematologic/Lymphatic: [ x ] negative [ ] anemia [ ] bleeding problem  Allergic/Immunologic: [ x ] negative [ ] itchy eyes [ ] nasal discharge [ ] hives [ ] angioedema    OBJECTIVE:  ICU Vital Signs Last 24 Hrs  T(C): 37.5 (19 Dec 2017 04:00), Max: 37.7 (19 Dec 2017 01:22)  T(F): 99.5 (19 Dec 2017 04:00), Max: 99.9 (19 Dec 2017 01:22)  HR: 75 (19 Dec 2017 07:00) (75 - 95)  BP: 138/63 (19 Dec 2017 07:00) (135/61 - 180/90)  BP(mean): 90 (19 Dec 2017 07:00) (88 - 102)  ABP: --  ABP(mean): --  RR: 20 (19 Dec 2017 07:00) (18 - 26)  SpO2: 97% (19 Dec 2017 07:00) (93% - 100%)        12-18 @ 07:01  -  12-19 @ 07:00  --------------------------------------------------------  IN: 155 mL / OUT: 2000 mL / NET: -1845 mL      CAPILLARY BLOOD GLUCOSE      POCT Blood Glucose.: 144 mg/dL (19 Dec 2017 07:03)      Constitutional: WDWN resting comfortably in bed; NAD  	Head: NC/AT  	Eyes: PERRLA, EOMI, clear conjunctiva  	ENT: no nasal discharge; no oropharyngeal erythema or exudates; dry oral mucosa  	Neck: supple; no JVD or thyromegaly  	Respiratory: CTAB  	Cardiac: +S1/S2; RRR; no M/R/G; PMI non-displaced  	Gastrointestinal: soft, NT/ND; no rebound or guarding; +BS, insulin pump intact  	Extremities: 2+ pitting edema in LLE, nonpitting edema in RLE  	Vascular: 2+ radial, DP/PT pulses B/L  	Lymphatic: no submandibular or cervical LAD  Neurologic: AAOx3    LINES:    HOSPITAL MEDICATIONS:  Standing Meds:  aspirin enteric coated 81 milliGRAM(s) Oral daily  atorvastatin 20 milliGRAM(s) Oral at bedtime  cinacalcet 60 milliGRAM(s) Oral daily  clopidogrel Tablet 75 milliGRAM(s) Oral at bedtime  dextrose 5%. 1000 milliLiter(s) IV Continuous <Continuous>  DULoxetine 60 milliGRAM(s) Oral daily  furosemide    Tablet 40 milliGRAM(s) Oral daily  heparin  Injectable 5000 Unit(s) SubCutaneous every 8 hours  hydrALAZINE 25 milliGRAM(s) Oral every 8 hours  insulin Infusion 0.5 Unit(s)/Hr IV Continuous <Continuous>  lisinopril 40 milliGRAM(s) Oral daily  metoprolol     tartrate 25 milliGRAM(s) Oral two times a day  sevelamer hydrochloride 2400 milliGRAM(s) Oral three times a day with meals      PRN Meds:      LABS:                        8.9    7.8   )-----------( 259      ( 19 Dec 2017 01:45 )             27.7     Hgb Trend: 8.9<--, 10.3<--  12-19    140  |  97  |  31<H>  ----------------------------<  160<H>  4.5   |  30  |  5.24<H>    Ca    8.2<L>      19 Dec 2017 06:10  Phos  4.0     12-19  Mg     2.4     12-19    TPro  6.6  /  Alb  3.4  /  TBili  0.4  /  DBili  x   /  AST  11  /  ALT  8<L>  /  AlkPhos  180<H>  12-19    Creatinine Trend: 5.24<--, 4.49<--, 7.33<--, 6.17<--, 5.00<--, 7.42<--  PT/INR - ( 19 Dec 2017 01:45 )   PT: 11.0 sec;   INR: 1.01 ratio         PTT - ( 19 Dec 2017 01:45 )  PTT:28.8 sec      Venous Blood Gas:  12-19 @ 01:48  7.40/39/56/24/87  VBG Lactate: 1.2  Venous Blood Gas:  12-18 @ 19:46  7.30/52/31/25/44  VBG Lactate: 1.1      MICROBIOLOGY:     RADIOLOGY:  [ ] Reviewed and interpreted by me    EKG: CHIEF COMPLAINT: Shortness of breath    Interval Events: no overnight events. This AM pt denies F/C/N/V, CP/SOB, abd pain.    REVIEW OF SYSTEMS:  Constitutional: [x  ] negative [ ] fevers [ ] chills [ ] weight loss [ ] weight gain  HEENT: [ x ] negative [ ] dry eyes [ ] eye irritation [ ] postnasal drip [ ] nasal congestion  CV: [ x ] negative  [ ] chest pain [ ] orthopnea [ ] palpitations [ ] murmur  Resp: [ x ] negative [ ] cough [ ] shortness of breath [ ] dyspnea [ ] wheezing [ ] sputum [ ] hemoptysis  GI: [ x ] negative [ ] nausea [ ] vomiting [ ] diarrhea [ ] constipation [ ] abd pain [ ] dysphagia   : [ x ] negative [ ] dysuria [ ] nocturia [ ] hematuria [ ] increased urinary frequency  Musculoskeletal: [ x ] negative [ ] back pain [ ] myalgias [ ] arthralgias [ ] fracture  Skin: [ x ] negative [ ] rash [ ] itch  Neurological: [ x ] negative [ ] headache [ ] dizziness [ ] syncope [ ] weakness [ ] numbness  Psychiatric: [ x ] negative [ ] anxiety [ ] depression  Endocrine: [ x ] negative [ ] diabetes [ ] thyroid problem  Hematologic/Lymphatic: [ x ] negative [ ] anemia [ ] bleeding problem  Allergic/Immunologic: [ x ] negative [ ] itchy eyes [ ] nasal discharge [ ] hives [ ] angioedema    OBJECTIVE:  ICU Vital Signs Last 24 Hrs  T(C): 37.5 (19 Dec 2017 04:00), Max: 37.7 (19 Dec 2017 01:22)  T(F): 99.5 (19 Dec 2017 04:00), Max: 99.9 (19 Dec 2017 01:22)  HR: 75 (19 Dec 2017 07:00) (75 - 95)  BP: 138/63 (19 Dec 2017 07:00) (135/61 - 180/90)  BP(mean): 90 (19 Dec 2017 07:00) (88 - 102)  ABP: --  ABP(mean): --  RR: 20 (19 Dec 2017 07:00) (18 - 26)  SpO2: 97% (19 Dec 2017 07:00) (93% - 100%)        12-18 @ 07:01  -  12-19 @ 07:00  --------------------------------------------------------  IN: 155 mL / OUT: 2000 mL / NET: -1845 mL      CAPILLARY BLOOD GLUCOSE  POCT Blood Glucose.: 144 mg/dL (19 Dec 2017 07:03)    Constitutional: WDWN resting comfortably in bed; NAD  	Head: NC/AT  	Eyes: PERRLA, EOMI, clear conjunctiva  	ENT: no nasal discharge; no oropharyngeal erythema or exudates; dry oral mucosa  	Neck: supple; no JVD or thyromegaly  	Respiratory: CTAB  	Cardiac: +S1/S2; RRR; no M/R/G; PMI non-displaced  	Gastrointestinal: soft, NT/ND; no rebound or guarding; +BS, insulin pump intact  	Extremities: 2+ pitting edema in LLE, nonpitting edema in RLE  	Vascular: 2+ radial, DP/PT pulses B/L  	Lymphatic: no submandibular or cervical LAD  Neurologic: AAOx3    LINES:    HOSPITAL MEDICATIONS:  Standing Meds:  aspirin enteric coated 81 milliGRAM(s) Oral daily  atorvastatin 20 milliGRAM(s) Oral at bedtime  cinacalcet 60 milliGRAM(s) Oral daily  clopidogrel Tablet 75 milliGRAM(s) Oral at bedtime  dextrose 5%. 1000 milliLiter(s) IV Continuous <Continuous>  DULoxetine 60 milliGRAM(s) Oral daily  furosemide    Tablet 40 milliGRAM(s) Oral daily  heparin  Injectable 5000 Unit(s) SubCutaneous every 8 hours  hydrALAZINE 25 milliGRAM(s) Oral every 8 hours  insulin Infusion 0.5 Unit(s)/Hr IV Continuous <Continuous>  lisinopril 40 milliGRAM(s) Oral daily  metoprolol     tartrate 25 milliGRAM(s) Oral two times a day  sevelamer hydrochloride 2400 milliGRAM(s) Oral three times a day with meals      PRN Meds:      LABS:                        8.9    7.8   )-----------( 259      ( 19 Dec 2017 01:45 )             27.7     Hgb Trend: 8.9<--, 10.3<--  12-19    140  |  97  |  31<H>  ----------------------------<  160<H>  4.5   |  30  |  5.24<H>    Ca    8.2<L>      19 Dec 2017 06:10  Phos  4.0     12-19  Mg     2.4     12-19    TPro  6.6  /  Alb  3.4  /  TBili  0.4  /  DBili  x   /  AST  11  /  ALT  8<L>  /  AlkPhos  180<H>  12-19    Creatinine Trend: 5.24<--, 4.49<--, 7.33<--, 6.17<--, 5.00<--, 7.42<--  PT/INR - ( 19 Dec 2017 01:45 )   PT: 11.0 sec;   INR: 1.01 ratio    PTT - ( 19 Dec 2017 01:45 )  PTT:28.8 sec    Betahydroxybutyrate: 0    Venous Blood Gas:  12-19 @ 01:48  7.40/39/56/24/87  VBG Lactate: 1.2  Venous Blood Gas:  12-18 @ 19:46  7.30/52/31/25/44  VBG Lactate: 1.1    MICROBIOLOGY:   RVP negative    RADIOLOGY:  [ ] Reviewed and interpreted by me    EKG:

## 2017-12-20 LAB
ACETONE SERPL-MCNC: NEGATIVE — SIGNIFICANT CHANGE UP
ALBUMIN SERPL ELPH-MCNC: 3.5 G/DL — SIGNIFICANT CHANGE UP (ref 3.3–5)
ALP SERPL-CCNC: 165 U/L — HIGH (ref 40–120)
ALT FLD-CCNC: 8 U/L RC — LOW (ref 10–45)
ANION GAP SERPL CALC-SCNC: 12 MMOL/L — SIGNIFICANT CHANGE UP (ref 5–17)
ANION GAP SERPL CALC-SCNC: 13 MMOL/L — SIGNIFICANT CHANGE UP (ref 5–17)
ANION GAP SERPL CALC-SCNC: 17 MMOL/L — SIGNIFICANT CHANGE UP (ref 5–17)
ANION GAP SERPL CALC-SCNC: 24 MMOL/L — HIGH (ref 5–17)
AST SERPL-CCNC: 17 U/L — SIGNIFICANT CHANGE UP (ref 10–40)
B-OH-BUTYR SERPL-SCNC: 0.1 MMOL/L — SIGNIFICANT CHANGE UP
B-OH-BUTYR SERPL-SCNC: 0.4 MMOL/L — SIGNIFICANT CHANGE UP
B-OH-BUTYR SERPL-SCNC: 5 MMOL/L — HIGH
BILIRUB SERPL-MCNC: 0.4 MG/DL — SIGNIFICANT CHANGE UP (ref 0.2–1.2)
BUN SERPL-MCNC: 16 MG/DL — SIGNIFICANT CHANGE UP (ref 7–23)
BUN SERPL-MCNC: 26 MG/DL — HIGH (ref 7–23)
BUN SERPL-MCNC: 28 MG/DL — HIGH (ref 7–23)
BUN SERPL-MCNC: 29 MG/DL — HIGH (ref 7–23)
CALCIUM SERPL-MCNC: 7.9 MG/DL — LOW (ref 8.4–10.5)
CALCIUM SERPL-MCNC: 8.3 MG/DL — LOW (ref 8.4–10.5)
CALCIUM SERPL-MCNC: 8.4 MG/DL — SIGNIFICANT CHANGE UP (ref 8.4–10.5)
CALCIUM SERPL-MCNC: 8.6 MG/DL — SIGNIFICANT CHANGE UP (ref 8.4–10.5)
CHLORIDE SERPL-SCNC: 91 MMOL/L — LOW (ref 96–108)
CHLORIDE SERPL-SCNC: 95 MMOL/L — LOW (ref 96–108)
CHLORIDE SERPL-SCNC: 96 MMOL/L — SIGNIFICANT CHANGE UP (ref 96–108)
CHLORIDE SERPL-SCNC: 96 MMOL/L — SIGNIFICANT CHANGE UP (ref 96–108)
CO2 SERPL-SCNC: 17 MMOL/L — LOW (ref 22–31)
CO2 SERPL-SCNC: 23 MMOL/L — SIGNIFICANT CHANGE UP (ref 22–31)
CO2 SERPL-SCNC: 24 MMOL/L — SIGNIFICANT CHANGE UP (ref 22–31)
CO2 SERPL-SCNC: 25 MMOL/L — SIGNIFICANT CHANGE UP (ref 22–31)
CREAT SERPL-MCNC: 3.29 MG/DL — HIGH (ref 0.5–1.3)
CREAT SERPL-MCNC: 3.86 MG/DL — HIGH (ref 0.5–1.3)
CREAT SERPL-MCNC: 4.14 MG/DL — HIGH (ref 0.5–1.3)
CREAT SERPL-MCNC: 4.29 MG/DL — HIGH (ref 0.5–1.3)
GLUCOSE BLDC GLUCOMTR-MCNC: 130 MG/DL — HIGH (ref 70–99)
GLUCOSE BLDC GLUCOMTR-MCNC: 159 MG/DL — HIGH (ref 70–99)
GLUCOSE BLDC GLUCOMTR-MCNC: 220 MG/DL — HIGH (ref 70–99)
GLUCOSE BLDC GLUCOMTR-MCNC: 285 MG/DL — HIGH (ref 70–99)
GLUCOSE BLDC GLUCOMTR-MCNC: 328 MG/DL — HIGH (ref 70–99)
GLUCOSE BLDC GLUCOMTR-MCNC: 422 MG/DL — HIGH (ref 70–99)
GLUCOSE BLDC GLUCOMTR-MCNC: 457 MG/DL — CRITICAL HIGH (ref 70–99)
GLUCOSE SERPL-MCNC: 158 MG/DL — HIGH (ref 70–99)
GLUCOSE SERPL-MCNC: 242 MG/DL — HIGH (ref 70–99)
GLUCOSE SERPL-MCNC: 288 MG/DL — HIGH (ref 70–99)
GLUCOSE SERPL-MCNC: 496 MG/DL — CRITICAL HIGH (ref 70–99)
HCT VFR BLD CALC: 28.3 % — LOW (ref 34.5–45)
HGB BLD-MCNC: 9.4 G/DL — LOW (ref 11.5–15.5)
MAGNESIUM SERPL-MCNC: 2.2 MG/DL — SIGNIFICANT CHANGE UP (ref 1.6–2.6)
MCHC RBC-ENTMCNC: 33.3 GM/DL — SIGNIFICANT CHANGE UP (ref 32–36)
MCHC RBC-ENTMCNC: 35.2 PG — HIGH (ref 27–34)
MCV RBC AUTO: 106 FL — HIGH (ref 80–100)
PHOSPHATE SERPL-MCNC: 2.9 MG/DL — SIGNIFICANT CHANGE UP (ref 2.5–4.5)
PHOSPHATE SERPL-MCNC: 3.2 MG/DL — SIGNIFICANT CHANGE UP (ref 2.5–4.5)
PHOSPHATE SERPL-MCNC: 3.4 MG/DL — SIGNIFICANT CHANGE UP (ref 2.5–4.5)
PLATELET # BLD AUTO: 247 K/UL — SIGNIFICANT CHANGE UP (ref 150–400)
POTASSIUM SERPL-MCNC: 4.4 MMOL/L — SIGNIFICANT CHANGE UP (ref 3.5–5.3)
POTASSIUM SERPL-MCNC: 4.6 MMOL/L — SIGNIFICANT CHANGE UP (ref 3.5–5.3)
POTASSIUM SERPL-MCNC: 5.1 MMOL/L — SIGNIFICANT CHANGE UP (ref 3.5–5.3)
POTASSIUM SERPL-MCNC: 5.6 MMOL/L — HIGH (ref 3.5–5.3)
POTASSIUM SERPL-SCNC: 4.4 MMOL/L — SIGNIFICANT CHANGE UP (ref 3.5–5.3)
POTASSIUM SERPL-SCNC: 4.6 MMOL/L — SIGNIFICANT CHANGE UP (ref 3.5–5.3)
POTASSIUM SERPL-SCNC: 5.1 MMOL/L — SIGNIFICANT CHANGE UP (ref 3.5–5.3)
POTASSIUM SERPL-SCNC: 5.6 MMOL/L — HIGH (ref 3.5–5.3)
PROT SERPL-MCNC: 6.7 G/DL — SIGNIFICANT CHANGE UP (ref 6–8.3)
RBC # BLD: 2.68 M/UL — LOW (ref 3.8–5.2)
RBC # FLD: 11.7 % — SIGNIFICANT CHANGE UP (ref 10.3–14.5)
SODIUM SERPL-SCNC: 132 MMOL/L — LOW (ref 135–145)
SODIUM SERPL-SCNC: 133 MMOL/L — LOW (ref 135–145)
SODIUM SERPL-SCNC: 133 MMOL/L — LOW (ref 135–145)
SODIUM SERPL-SCNC: 135 MMOL/L — SIGNIFICANT CHANGE UP (ref 135–145)
WBC # BLD: 4.4 K/UL — SIGNIFICANT CHANGE UP (ref 3.8–10.5)
WBC # FLD AUTO: 4.4 K/UL — SIGNIFICANT CHANGE UP (ref 3.8–10.5)

## 2017-12-20 PROCEDURE — 99233 SBSQ HOSP IP/OBS HIGH 50: CPT

## 2017-12-20 PROCEDURE — 12345: CPT | Mod: NC

## 2017-12-20 RX ORDER — SODIUM CHLORIDE 9 MG/ML
1000 INJECTION INTRAMUSCULAR; INTRAVENOUS; SUBCUTANEOUS
Qty: 0 | Refills: 0 | Status: DISCONTINUED | OUTPATIENT
Start: 2017-12-20 | End: 2017-12-21

## 2017-12-20 RX ORDER — INSULIN HUMAN 100 [IU]/ML
0.5 INJECTION, SOLUTION SUBCUTANEOUS
Qty: 100 | Refills: 0 | Status: DISCONTINUED | OUTPATIENT
Start: 2017-12-20 | End: 2017-12-20

## 2017-12-20 RX ORDER — HYDRALAZINE HCL 50 MG
100 TABLET ORAL EVERY 8 HOURS
Qty: 0 | Refills: 0 | Status: DISCONTINUED | OUTPATIENT
Start: 2017-12-20 | End: 2017-12-21

## 2017-12-20 RX ORDER — HYDRALAZINE HCL 50 MG
10 TABLET ORAL ONCE
Qty: 0 | Refills: 0 | Status: COMPLETED | OUTPATIENT
Start: 2017-12-20 | End: 2017-12-20

## 2017-12-20 RX ORDER — HYDRALAZINE HCL 50 MG
75 TABLET ORAL ONCE
Qty: 0 | Refills: 0 | Status: COMPLETED | OUTPATIENT
Start: 2017-12-20 | End: 2017-12-20

## 2017-12-20 RX ORDER — INSULIN LISPRO 100/ML
1 VIAL (ML) SUBCUTANEOUS
Qty: 0 | Refills: 0 | Status: DISCONTINUED | OUTPATIENT
Start: 2017-12-20 | End: 2017-12-21

## 2017-12-20 RX ADMIN — Medication 10 MILLIGRAM(S): at 11:15

## 2017-12-20 RX ADMIN — Medication 40 MILLIGRAM(S): at 05:56

## 2017-12-20 RX ADMIN — Medication 25 MILLIGRAM(S): at 13:47

## 2017-12-20 RX ADMIN — SEVELAMER CARBONATE 2400 MILLIGRAM(S): 2400 POWDER, FOR SUSPENSION ORAL at 18:28

## 2017-12-20 RX ADMIN — CINACALCET 60 MILLIGRAM(S): 30 TABLET, FILM COATED ORAL at 11:14

## 2017-12-20 RX ADMIN — LISINOPRIL 40 MILLIGRAM(S): 2.5 TABLET ORAL at 05:56

## 2017-12-20 RX ADMIN — OXYCODONE AND ACETAMINOPHEN 1 TABLET(S): 5; 325 TABLET ORAL at 14:15

## 2017-12-20 RX ADMIN — Medication 5: at 09:24

## 2017-12-20 RX ADMIN — SEVELAMER CARBONATE 2400 MILLIGRAM(S): 2400 POWDER, FOR SUSPENSION ORAL at 09:30

## 2017-12-20 RX ADMIN — Medication 25 MILLIGRAM(S): at 05:56

## 2017-12-20 RX ADMIN — SODIUM CHLORIDE 50 MILLILITER(S): 9 INJECTION INTRAMUSCULAR; INTRAVENOUS; SUBCUTANEOUS at 11:56

## 2017-12-20 RX ADMIN — Medication 81 MILLIGRAM(S): at 11:14

## 2017-12-20 RX ADMIN — Medication 3 UNIT(S): at 09:25

## 2017-12-20 RX ADMIN — Medication 25 MILLIGRAM(S): at 18:28

## 2017-12-20 RX ADMIN — OXYCODONE AND ACETAMINOPHEN 1 TABLET(S): 5; 325 TABLET ORAL at 23:10

## 2017-12-20 RX ADMIN — SODIUM CHLORIDE 50 MILLILITER(S): 9 INJECTION INTRAMUSCULAR; INTRAVENOUS; SUBCUTANEOUS at 23:10

## 2017-12-20 RX ADMIN — OXYCODONE AND ACETAMINOPHEN 1 TABLET(S): 5; 325 TABLET ORAL at 15:15

## 2017-12-20 RX ADMIN — SEVELAMER CARBONATE 2400 MILLIGRAM(S): 2400 POWDER, FOR SUSPENSION ORAL at 14:20

## 2017-12-20 RX ADMIN — CLOPIDOGREL BISULFATE 75 MILLIGRAM(S): 75 TABLET, FILM COATED ORAL at 22:29

## 2017-12-20 RX ADMIN — DULOXETINE HYDROCHLORIDE 60 MILLIGRAM(S): 30 CAPSULE, DELAYED RELEASE ORAL at 11:14

## 2017-12-20 RX ADMIN — Medication 75 MILLIGRAM(S): at 14:16

## 2017-12-20 RX ADMIN — Medication 100 MILLIGRAM(S): at 22:29

## 2017-12-20 RX ADMIN — ATORVASTATIN CALCIUM 20 MILLIGRAM(S): 80 TABLET, FILM COATED ORAL at 22:29

## 2017-12-20 NOTE — ADVANCED PRACTICE NURSE CONSULT - ASSESSMENT
Pt is a 62F with PMHx T1DM (on insulin pump), HTN, HLD, former smoker, MI, CAD s/p PCI to RCA and brachytherapy in Aug 2017, dCHF (last TTE- November 2017- mild-mod MR, mild AS, mild-mod TR EF40-45%) chronic LLE pain and edema in setting of severe PVD s/p L & R fem-pop bypass  graft,  multiple vascular surgery both leg w/ fem-pop bypass revisions , Subclavian vein stenosis left s/p stent, ESRD on HD (Tu/Thr/Sat) via L AVF with oliguria, CVA with memory deficit, depression who presented with shortness of breath.   A CXR showed pulmonary edema; she was placed on BiPAP, given 80mg IV lasix. Labs were significant for a K+ of 6.8 so house renal was called for urgent dialysis. Lab work was also significant for a beta-hydroxybutyrate of 2.2, small serum acetone, blood glucose of 387, an anion gap of 22, and pH of 7.3 on VBG. She was admitted to the MICU after emergent HD for further management of DKA.  Today pt will be transitioned from insulin gtt  to her insulin pump. Pt received Lantus 5 units at 10am yesterday and insulin pump reconnected at 10am.   Pt’s had a FS of 457 mg/dl at 9:06 am and was given Humalog 5 units correction dose.  Insulin pump reconnected on left abdomen and site remains clean, dry and intact.  Temporary basal of 50% for 6 hours set for pt to avoid hypoglycemia as per Julianne Gibson Endocrine NP orders.  Pt has 9 sets of infusion sets and 9 sets of reservoirs with her.  Insulin pump forms ALL completed with pt.  Primary RN to follow-up making sure that attending physician sign attestation form.   Pt’s current insulin pump settings as follows;   Basal  12 AM - 0.275 units/hour  5AM – 0.375 units/hour  Total basal 8.5 units   ICR  12 am-12am – 1:15  ISF   12am - 60  7am – 40  6pm - 60  BGT  12am - 110-130 mg/dl  8am - 100-120 mg/dl  Insulin duration: 6 hours

## 2017-12-20 NOTE — PROGRESS NOTE ADULT - SUBJECTIVE AND OBJECTIVE BOX
DIABETES FOLLOW UP NOTE: Saw pt x2 today  INTERVAL HX: 61 y/o F w/h/o uncontrolled T1DM with multiplt complications and comorbidities including CV disease and ESRD on HD. Well known to DM team from frequent admissions. Here with SOB, mild DKA and fluid retention requiring urgent HD. Remains in MICU. Pt was on SQ insulin regimen yesterday after insulin drip was discontinued. However, pt had a hypoglycemic event  ac dinner>treated with rebound hyperglycemia this am. BGs > 400s this am. Pt received total of Humalog 8 units for breakfast. Insulin pump was restarted this am at 50% temp basal  because pt received a rather high dose of Humalog to correct her hyperglycemia. MICU team contact this provider at around 11:50 am with morning BMP values showing opening anion mariluz and back on DKA. Pt refusing to go back on insulin drip. Spoke to pt and MICU team. Pt agreed to do Humalog bolus and  for her. BMP from this am showed pt back on DKA but this test blood was drawn prior to pt getting insulin dose which is a high dose for this particular pt. Pt refusing to go    Glycemic control     Review of Systems:  Cardiovascular: No chest pain, palpitations  Respiratory: No SOB, no cough  GI: No nausea, vomiting, abdominal pain  Endocrine: no polyuria, polydipsia or S&Sx of hypoglycemia    Allergies    No Known Allergies    Intolerances      MEDICATIONS  (STANDING):  aspirin enteric coated 81 milliGRAM(s) Oral daily  atorvastatin 20 milliGRAM(s) Oral at bedtime  cinacalcet 60 milliGRAM(s) Oral daily  clopidogrel Tablet 75 milliGRAM(s) Oral at bedtime  DULoxetine 60 milliGRAM(s) Oral daily  furosemide    Tablet 40 milliGRAM(s) Oral daily  heparin  Injectable 5000 Unit(s) SubCutaneous every 8 hours  insulin lispro (HumaLOG) corrective regimen sliding scale   SubCutaneous Before meals and at bedtime  insulin lispro (HumaLOG) Pump 1 Each SubCutaneous Continuous Pump  lisinopril 40 milliGRAM(s) Oral daily  metoprolol     tartrate 25 milliGRAM(s) Oral two times a day  sevelamer hydrochloride 2400 milliGRAM(s) Oral three times a day with meals  sodium chloride 0.9%. 1000 milliLiter(s) (50 mL/Hr) IV Continuous <Continuous>      PHYSICAL EXAM:  VITALS: T(C): 36.8 (12-20-17 @ 12:00)  T(F): 98.3 (12-20-17 @ 12:00), Max: 98.4 (12-20-17 @ 00:00)  HR: 69 (12-20-17 @ 14:07) (64 - 80)  BP: 185/78 (12-20-17 @ 14:00) (132/63 - 185/78)  RR:  (12 - 30)  SpO2:  (77% - 99%)  Wt(kg): --  GENERAL: NAD  Abdomen: Soft, nontender, non distended, normal bowel sounds  Extremities:   NEURO: sensation intact, extraocular movements intact, no tremor, normal reflexes    POCT Blood Glucose.: 220 mg/dL (12-20-17 @ 14:05)  POCT Blood Glucose.: 285 mg/dL (12-20-17 @ 13:09)  POCT Blood Glucose.: 328 mg/dL (12-20-17 @ 11:50)  POCT Blood Glucose.: 457 mg/dL (12-20-17 @ 09:06)  POCT Blood Glucose.: 422 mg/dL (12-20-17 @ 09:05)  POCT Blood Glucose.: 184 mg/dL (12-19-17 @ 21:12)  POCT Blood Glucose.: 177 mg/dL (12-19-17 @ 19:58)  POCT Blood Glucose.: 81 mg/dL (12-19-17 @ 19:19)  POCT Blood Glucose.: 70 mg/dL (12-19-17 @ 18:55)  POCT Blood Glucose.: 72 mg/dL (12-19-17 @ 18:30)  POCT Blood Glucose.: 105 mg/dL (12-19-17 @ 14:40)  POCT Blood Glucose.: 153 mg/dL (12-19-17 @ 13:45)  POCT Blood Glucose.: 209 mg/dL (12-19-17 @ 12:13)  POCT Blood Glucose.: 270 mg/dL (12-19-17 @ 11:17)  POCT Blood Glucose.: 228 mg/dL (12-19-17 @ 10:04)  POCT Blood Glucose.: 146 mg/dL (12-19-17 @ 09:01)  POCT Blood Glucose.: 138 mg/dL (12-19-17 @ 08:05)  POCT Blood Glucose.: 144 mg/dL (12-19-17 @ 07:03)  POCT Blood Glucose.: 144 mg/dL (12-19-17 @ 06:06)  POCT Blood Glucose.: 196 mg/dL (12-19-17 @ 05:01)  POCT Blood Glucose.: 243 mg/dL (12-19-17 @ 04:01)  POCT Blood Glucose.: 230 mg/dL (12-19-17 @ 03:05)  POCT Blood Glucose.: 275 mg/dL (12-19-17 @ 02:23)  POCT Blood Glucose.: 287 mg/dL (12-19-17 @ 01:27)  POCT Blood Glucose.: 354 mg/dL (12-18-17 @ 21:15)  POCT Blood Glucose.: 387 mg/dL (12-18-17 @ 20:48)                            9.4    4.4   )-----------( 247      ( 20 Dec 2017 03:08 )             28.3       12-20    133<L>  |  96  |  28<H>  ----------------------------<  288<H>  4.4   |  24  |  4.14<H>    EGFR if : 13<L>  EGFR if non : 11<L>    Ca    8.3<L>      12-20  Mg     2.2     12-20  Phos  2.9     12-20    TPro  6.7  /  Alb  3.5  /  TBili  0.4  /  DBili  x   /  AST  17  /  ALT  8<L>  /  AlkPhos  165<H>  12-20      Thyroid Function Tests:  12-19 @ 06:12 TSH 1.07 FreeT4 -- T3 -- Anti TPO -- Anti Thyroglobulin Ab -- TSI --      Hemoglobin A1C, Whole Blood: 8.5 % <H> [4.0 - 5.6] (11-25-17 @ 04:12) DIABETES FOLLOW UP NOTE: Saw pt x2 today  INTERVAL HX: 61 y/o F w/h/o uncontrolled T1DM with multiplt complications and comorbidities including CV disease and ESRD on HD. Well known to DM team from frequent admissions. Here with SOB, mild DKA and fluid retention requiring urgent HD. Remains in MICU. Pt was on SQ insulin regimen yesterday after insulin drip was discontinued. However, pt had BG in 70s ac dinner>treated with rebound hyperglycemia this am (BGs > 400). Pt received total of Humalog 8 units for breakfast. Insulin pump was restarted this am at 50% temp basal  because pt received a rather high dose of Humalog to correct her hyperglycemia. MICU team contacted this provider at around 11:50 am with morning BMP values showing opening anion gap and back on DKA. Pt refusing to go back on insulin drip. Spoke to pt and MICU team. Pt agreed to do Humalog bolus and  for her to be NPO except water> repeat BMP at 1pm. Pt bolused  with 5.2 units of insulin at the time for BG POC value of 328. BMP at 1pm showed resolved DKA. Pt to restart PO and bolus insulin for carb intake. Will follow up POC at 5pm.  Basal dose back at 100% on insulin pump. Pt made aware that if  AG opens again she will need to go back on insulin drip. Pt agreeable to this.     Review of Systems: 'I feel fine, I want to go home"  Cardiovascular: No chest pain, palpitations  Respiratory: No SOB, no cough  GI: No nausea, vomiting, abdominal pain  Endocrine: no polyuria, polydipsia or S&Sx of hypoglycemia    Allergies    No Known Allergies    Intolerances      MEDICATIONS:  atorvastatin 20 milliGRAM(s) Oral at bedtime  insulin lispro (HumaLOG) corrective regimen sliding scale   SubCutaneous Before meals and at bedtime  insulin lispro (HumaLOG) Pump 1 Each SubCutaneous Continuous Pump. At following settings:  Basal  12 AM - 0.275 units/hour  5AM – 0.375 units/hour  Total basal 8.5 units   ICR  12 am-12am – 1:15  ISF   12am - 60  7am – 40  6pm - 60  BGT  12am - 110-130 mg/dl  8am - 100-120 mg/dl  Insulin duration: 6 hours      PHYSICAL EXAM:  VITALS: T(C): 36.8 (12-20-17 @ 12:00)  T(F): 98.3 (12-20-17 @ 12:00), Max: 98.4 (12-20-17 @ 00:00)  HR: 69 (12-20-17 @ 14:07) (64 - 80)  BP: 185/78 (12-20-17 @ 14:00) (132/63 - 185/78)  RR:  (12 - 30)  SpO2:  (77% - 99%)  Wt(kg): --  GENERAL: NAD  Abdomen: Soft, nontender, non distended, L abd quadrant insulin pump site D&I  Extremities: Warm, trace edema in LEs  NEURO: A&OX3      LABS:  POCT Blood Glucose.: 220 mg/dL (12-20-17 @ 14:05)  POCT Blood Glucose.: 285 mg/dL (12-20-17 @ 13:09)  POCT Blood Glucose.: 328 mg/dL (12-20-17 @ 11:50)  POCT Blood Glucose.: 457 mg/dL (12-20-17 @ 09:06)  POCT Blood Glucose.: 422 mg/dL (12-20-17 @ 09:05)  POCT Blood Glucose.: 184 mg/dL (12-19-17 @ 21:12)  POCT Blood Glucose.: 177 mg/dL (12-19-17 @ 19:58)  POCT Blood Glucose.: 81 mg/dL (12-19-17 @ 19:19)  POCT Blood Glucose.: 70 mg/dL (12-19-17 @ 18:55)  POCT Blood Glucose.: 72 mg/dL (12-19-17 @ 18:30)  POCT Blood Glucose.: 105 mg/dL (12-19-17 @ 14:40)  POCT Blood Glucose.: 153 mg/dL (12-19-17 @ 13:45)  POCT Blood Glucose.: 209 mg/dL (12-19-17 @ 12:13)  POCT Blood Glucose.: 270 mg/dL (12-19-17 @ 11:17)  POCT Blood Glucose.: 228 mg/dL (12-19-17 @ 10:04)  POCT Blood Glucose.: 146 mg/dL (12-19-17 @ 09:01)  POCT Blood Glucose.: 138 mg/dL (12-19-17 @ 08:05)  POCT Blood Glucose.: 144 mg/dL (12-19-17 @ 07:03)  POCT Blood Glucose.: 144 mg/dL (12-19-17 @ 06:06)  POCT Blood Glucose.: 196 mg/dL (12-19-17 @ 05:01)  POCT Blood Glucose.: 243 mg/dL (12-19-17 @ 04:01)  POCT Blood Glucose.: 230 mg/dL (12-19-17 @ 03:05)  POCT Blood Glucose.: 275 mg/dL (12-19-17 @ 02:23)  POCT Blood Glucose.: 287 mg/dL (12-19-17 @ 01:27)  POCT Blood Glucose.: 354 mg/dL (12-18-17 @ 21:15)  POCT Blood Glucose.: 387 mg/dL (12-18-17 @ 20:48)                            9.4    4.4   )-----------( 247      ( 20 Dec 2017 03:08 )             28.3       12-20    133<L>  |  96  |  28<H>  ----------------------------<  288<H>  4.4   |  24  |  4.14<H>    EGFR if : 13<L>  EGFR if non : 11<L>    Ca    8.3<L>      12-20  Mg     2.2     12-20  Phos  2.9     12-20    TPro  6.7  /  Alb  3.5  /  TBili  0.4  /  DBili  x   /  AST  17  /  ALT  8<L>  /  AlkPhos  165<H>  12-20      Thyroid Function Tests:  12-19 @ 06:12 TSH 1.07 FreeT4 -- T3 -- Anti TPO -- Anti Thyroglobulin Ab -- TSI --      Hemoglobin A1C, Whole Blood: 8.5 % <H> [4.0 - 5.6] (11-25-17 @ 04:12)

## 2017-12-20 NOTE — PROGRESS NOTE ADULT - SUBJECTIVE AND OBJECTIVE BOX
[facility]   [phone]  RENAL FOLLOW UP NOTE  --------------------------------------------------------------------------------  Chief Complaint:    24 hour events/subjective:    states breathing is better. no sob or cp or palp     PAST HISTORY  --------------------------------------------------------------------------------  No significant changes to PMH, PSH, FHx, SHx, unless otherwise noted    ALLERGIES & MEDICATIONS  --------------------------------------------------------------------------------  Allergies    No Known Allergies    Intolerances      Standing Inpatient Medications  aspirin enteric coated 81 milliGRAM(s) Oral daily  atorvastatin 20 milliGRAM(s) Oral at bedtime  cinacalcet 60 milliGRAM(s) Oral daily  clopidogrel Tablet 75 milliGRAM(s) Oral at bedtime  DULoxetine 60 milliGRAM(s) Oral daily  furosemide    Tablet 40 milliGRAM(s) Oral daily  heparin  Injectable 5000 Unit(s) SubCutaneous every 8 hours  hydrALAZINE 25 milliGRAM(s) Oral every 8 hours  insulin lispro (HumaLOG) corrective regimen sliding scale   SubCutaneous Before meals and at bedtime  insulin lispro Injectable (HumaLOG) 3 Unit(s) SubCutaneous before breakfast  insulin lispro Injectable (HumaLOG) 3 Unit(s) SubCutaneous before lunch  insulin lispro Injectable (HumaLOG) 3 Unit(s) SubCutaneous before dinner  lisinopril 40 milliGRAM(s) Oral daily  metoprolol     tartrate 25 milliGRAM(s) Oral two times a day  sevelamer hydrochloride 2400 milliGRAM(s) Oral three times a day with meals    PRN Inpatient Medications  oxyCODONE    5 mG/acetaminophen 325 mG 1 Tablet(s) Oral every 6 hours PRN      REVIEW OF SYSTEMS  --------------------------------------------------------------------------------    Gen: denies fevers/chills,  CVS: denies chest pain/palpitations  Resp: denies SOB/Cough  GI: Denies N/V/Abd pain  : Denies dysuria    All other systems were reviewed and are negative, except as noted.    VITALS/PHYSICAL EXAM  --------------------------------------------------------------------------------  T(C): 36.6 (12-20-17 @ 08:00), Max: 36.9 (12-20-17 @ 00:00)  HR: 67 (12-20-17 @ 09:00) (64 - 80)  BP: 141/101 (12-20-17 @ 09:00) (132/63 - 169/73)  RR: 17 (12-20-17 @ 09:00) (12 - 30)  SpO2: 97% (12-20-17 @ 09:00) (90% - 99%)  Wt(kg): --  Height (cm): 160 (12-19-17 @ 01:53)  Weight (kg): 55.1 (12-19-17 @ 01:22)  BMI (kg/m2): 21.5 (12-19-17 @ 01:53)  BSA (m2): 1.56 (12-19-17 @ 01:53)      12-19-17 @ 07:01  -  12-20-17 @ 07:00  --------------------------------------------------------  IN: 852.5 mL / OUT: 2000 mL / NET: -1147.5 mL        	    Physical Exam:  	Gen: alert oriented place person and date   	Pulm: Decreased breath sounds b/l bases. no rales or ronchi or wheezing  	CV: RRR, S1/S2. no rub  	Back: No CVA tenderness; no sacral edema  	Abd: +BS, soft, nontender/nondistended  	: No suprapubic tenderness.               Extremity: No cyanosis, no edema no clubbing  	  LABS/STUDIES  --------------------------------------------------------------------------------              9.4    4.4   >-----------<  247      [12-20-17 @ 03:08]              28.3     135  |  95  |  16  ----------------------------<  242      [12-20-17 @ 03:08]  5.1   |  23  |  3.29        Ca     8.4     [12-20-17 @ 03:08]      Mg     2.2     [12-20-17 @ 03:08]      Phos  3.4     [12-20-17 @ 03:08]    TPro  6.7  /  Alb  3.5  /  TBili  0.4  /  DBili  x   /  AST  17  /  ALT  8   /  AlkPhos  165  [12-20-17 @ 03:08]    PT/INR: PT 11.0 , INR 1.01       [12-19-17 @ 01:45]  PTT: 28.8       [12-19-17 @ 01:45]    Troponin 0.12      [12-19-17 @ 10:40]        [12-19-17 @ 01:45]    Creatinine Trend:  SCr 3.29 [12-20 @ 03:08]  SCr 2.49 [12-19 @ 17:14]  SCr 5.44 [12-19 @ 10:40]  SCr 5.24 [12-19 @ 06:10]  SCr 4.49 [12-19 @ 01:45]                  HbA1c 8.5      [11-25-17 @ 04:12]  TSH 1.07      [12-19-17 @ 06:12]

## 2017-12-20 NOTE — CHART NOTE - NSCHARTNOTEFT_GEN_A_CORE
MICU Transfer Note    Transfer from: MICU    Transfer to: (X) Medicine    (  ) Telemetry     (   ) RCU        (    ) Palliative         (   ) Stroke Unit          (   ) __________________    Accepting Physican: Dr. Viera    MICU COURSE:    62F PMH T1DM (on insulin pump), HTN, HLD, former smoker, MI, CAD s/p PCI to RCA and brachytherapy in Aug 2017, dCHF (last TTE- November 2017- mild-mod MR, mild AS, mild-mod TR EF40-45%) chronic LLE pain and edema in setting of severe PVD s/p L & R fem-pop bypass  graft,  multiple vascular surgery both leg w/ fem-pop bypass revisions , Subclavian vein stenosis left s/p stent, ESRD on HD (Tu/Thr/Sat) via L AVF with oliguria, CVA with memory deficit, depression who presented with SOB.    Patient has multiple admissions for DKA, most recently from 11/24 - 11/29. One week prior to admission, has had URI symptoms and congestion, but no F/C, N/V/D, CP. Grandson with URI recently. The morning of admission, woke up acutely SOB. States she has been compliant with her home medications, however FS have been 200s-300s recently.  This AM she woke up acutely short of breath which prompted her to come to the ED. She states she has been compliant with her home medications and her blood glucoses recently have been in the 200s and 300s. Her  manages her insulin pump.    In ED, she was hypertensive. Other VS were WNL. CXR showed mild pulmonary edema so she was initially placed on BIPAP and given lasix 80mg IVP x 1. She was also hyperkalemic to 6.8. Urgent HD was performed overnight in the ICU. Bipap able to be transitioned off quickly. Labs were also significant for mild DKA with betahydroxybutyrate of 2.2, small acetone, and AG 22. She was never acidemic. The morning following ICU admission she was transitioned to Lantus 6U and the insulin gtt was shut off around 10:30 AM. She was resumed on a basal/bolus regimen.     On 12/20 in the morning, patient was hyperglycemic to 400s, betahydroxybutyrate was 5, and AG of 24.  Patient declined being placed back on an insulin drip; therefore, endocrinology started patient back on insulin pump.  On follow up BMP at 1:00PM, glucose was 290s, AG lowered to 13, acetone was 0, and betahydroxybutyrate was 0.1.  Patient is stable for transfer to the floors.      ASSESSMENT:    62F PMH T1DM (on insulin pump), HTN, HLD, MI, CAD s/p PCI to RCA and brachytherapy in Aug 2017, HFpEF (last TTE- Nov 2017- mild-mod MR, mild AS, moderate pulmHTN EF 40-45%) chronic LLE pain and edema in setting of severe PVD s/p L & R fem-pop   bypass  graft, ESRD on HD (Tu/Thr/Sat) via L AVF with oliguria, CVA with memory deficit, p/w SOB, admitted with pulmonary edema due to volume overload and mild DKA. Now improved after volume removal with HD. DKA resolved with insulin gtt.  Patient got mild DKA on 12/20 in the AM. Refused insulin gtt, but was put on insulin pump.  Anion gap subsequently resolved and patient stable for transfer to the floors.       PLAN:    #Neuro  -AAOx4 on admission, currently mentating at baseline  -cont to monitor mental status  -has hx of CVA, c/w home atorvastatin    #Pulmonary  -pt p/w SOB, most likely 2/2 fluid overload as CXR showed pulmonary edema. Saturating well on nasal cannula  -now s/p 2L fluid removal in HD, BP controlled and acceptable  -RVP negative    #Cardiac  -has hx of dHF, TTE on 11/27 showed EF 40-45%, mild-mod mitral regurg, mild-mod LVSD, mild concentric LVH, moderate pulm HTN  -c/w lasix for diuresis  -c/w home lisinopril, hydralazine for HTN  -c/w metoprolol 25mg BID  -c/w home ASA/plavix for CAD    #Endo  -pt in DKA on presentation with elevated AG, beta-hydroxybutyrate, and small serum acetone. Overnight gap closed, blood sugars acceptable, and beta-hydroxybutyrate now zero  - This morning AG of 24 with sugars>400, and betahydroxybutyrate is 5.   - patient refusing insulin gtt currently, endo is following pt and recommending the insulin pump.   If sugars remain elevated, insulin gtt will be starte.d  -Endo following, f/u recc  -BMP Q4H    #Heme  -anemic on presentation, stable. At baseline per previous hospital records  -follow CBC    #ID  -has URI sx on admission, now improved  -afebrile, no leukocytosis, RVP negative  -cont to monitor off abx    #Renal  -has ESRD, on dialysis Tu/Thr/Sat. Now s/p urgent HD for hyperkalemia, fluid overload  -renal following, appreciate recs  -Trend BMP, monitor UOP  -c/w home sensipar, renagel    #FEN/GI  -NPO in accordance with DKA protocol    #DVT ppx  -heparin subQ    #GOC  Full code    For followup:  - BMP at 5 PM  - F/u with endocrinology (pager: [phone]) regarding BMP at 5 PM      Fariba Fajardo, PGY-1

## 2017-12-20 NOTE — PROGRESS NOTE ADULT - SUBJECTIVE AND OBJECTIVE BOX
CHIEF COMPLAINT: Shortness of breath    Interval Events:     No overnight events. This morning, however, patient was hyperglycemic and AG is 24.     REVIEW OF SYSTEMS:  Constitutional: [x  ] negative [ ] fevers [ ] chills [ ] weight loss [ ] weight gain  HEENT: [ x ] negative [ ] dry eyes [ ] eye irritation [ ] postnasal drip [ ] nasal congestion  CV: [ x ] negative  [ ] chest pain [ ] orthopnea [ ] palpitations [ ] murmur  Resp: [ x ] negative [ ] cough [ ] shortness of breath [ ] dyspnea [ ] wheezing [ ] sputum [ ] hemoptysis  GI: [ x ] negative [ ] nausea [ ] vomiting [ ] diarrhea [ ] constipation [ ] abd pain [ ] dysphagia   : [ x ] negative [ ] dysuria [ ] nocturia [ ] hematuria [ ] increased urinary frequency  Musculoskeletal: [ x ] negative [ ] back pain [ ] myalgias [ ] arthralgias [ ] fracture  Skin: [ x ] negative [ ] rash [ ] itch  Neurological: [ x ] negative [ ] headache [ ] dizziness [ ] syncope [ ] weakness [ ] numbness  Psychiatric: [ x ] negative [ ] anxiety [ ] depression  Endocrine: [ x ] negative [ ] diabetes [ ] thyroid problem  Hematologic/Lymphatic: [ x ] negative [ ] anemia [ ] bleeding problem  Allergic/Immunologic: [ x ] negative [ ] itchy eyes [ ] nasal discharge [ ] hives [ ] angioedema    OBJECTIVE:  ICU Vital Signs Last 24 Hrs  T(C): 36.8 (20 Dec 2017 12:00), Max: 36.9 (20 Dec 2017 00:00)  T(F): 98.3 (20 Dec 2017 12:00), Max: 98.4 (20 Dec 2017 00:00)  HR: 71 (20 Dec 2017 12:00) (64 - 80)  BP: 163/71 (20 Dec 2017 12:00) (132/63 - 179/77)  BP(mean): 102 (20 Dec 2017 12:00) (87 - 117)  ABP: --  ABP(mean): --  RR: 18 (20 Dec 2017 12:00) (12 - 30)  SpO2: 98% (20 Dec 2017 12:00) (77% - 98%)      I&O's Summary    19 Dec 2017 07:01  -  20 Dec 2017 07:00  --------------------------------------------------------  IN: 852.5 mL / OUT: 2000 mL / NET: -1147.5 mL    20 Dec 2017 07:01  -  20 Dec 2017 12:53  --------------------------------------------------------  IN: 180 mL / OUT: 0 mL / NET: 180 mL      CAPILLARY BLOOD GLUCOSE      POCT Blood Glucose.: 328 mg/dL (20 Dec 2017 11:50)      Constitutional: WDWN resting comfortably in bed; NAD  	Head: NC/AT  	Eyes: PERRLA, EOMI, clear conjunctiva  	Neck: supple; no JVD or thyromegaly  	Respiratory: CTAB, no rales, rhonchi or wheezing  	Cardiac: +S1/S2; RRR; no M/R/G  	Gastrointestinal: soft, NT/ND; no rebound or guarding; +BS,  	Extremities: 1+ LE edema in LLL, no edema in RLE; sensation intact b/l LE  	Vascular: 2+ radial, DP pulses B/L  Neurologic: AAOx3    LINES: PIVs    MEDICATIONS  (STANDING):  aspirin enteric coated 81 milliGRAM(s) Oral daily  atorvastatin 20 milliGRAM(s) Oral at bedtime  cinacalcet 60 milliGRAM(s) Oral daily  clopidogrel Tablet 75 milliGRAM(s) Oral at bedtime  DULoxetine 60 milliGRAM(s) Oral daily  furosemide    Tablet 40 milliGRAM(s) Oral daily  heparin  Injectable 5000 Unit(s) SubCutaneous every 8 hours  hydrALAZINE 25 milliGRAM(s) Oral every 8 hours  insulin lispro (HumaLOG) corrective regimen sliding scale   SubCutaneous Before meals and at bedtime  insulin lispro (HumaLOG) Pump 1 Each SubCutaneous Continuous Pump  lisinopril 40 milliGRAM(s) Oral daily  metoprolol     tartrate 25 milliGRAM(s) Oral two times a day  sevelamer hydrochloride 2400 milliGRAM(s) Oral three times a day with meals  sodium chloride 0.9%. 1000 milliLiter(s) (50 mL/Hr) IV Continuous <Continuous>    MEDICATIONS  (PRN):  oxyCODONE    5 mG/acetaminophen 325 mG 1 Tablet(s) Oral every 6 hours PRN Severe Pain (7 - 10)      LABS:                         9.4    4.4   )-----------( 247      ( 20 Dec 2017 03:08 )             28.3       Hgb Trend: 9.4 <-- 8.9<--, 10.3<--  12-19    12-20    132<L>  |  91<L>  |  26<H>  ----------------------------<  496<HH>  5.6<H>   |  17<L>  |  3.86<H>    Ca    8.6      20 Dec 2017 09:35  Phos  3.4     12-20  Mg     2.2     12-20    TPro  6.7  /  Alb  3.5  /  TBili  0.4  /  DBili  x   /  AST  17  /  ALT  8<L>  /  AlkPhos  165<H>  12-20    Creatinine Trend: 3.86 <-- 5.24<--, 4.49<--, 7.33<--, 6.17<--, 5.00<--, 7.42<--  PT/INR - ( 19 Dec 2017 01:45 )   PT: 11.0 sec;   INR: 1.01 ratio    PTT - ( 19 Dec 2017 01:45 )  PTT:28.8 sec      MICROBIOLOGY:   RVP negative    RADIOLOGY:  [ ] Reviewed and interpreted by me    EKG:

## 2017-12-20 NOTE — PHYSICAL THERAPY INITIAL EVALUATION ADULT - PRECAUTIONS/LIMITATIONS, REHAB EVAL
pt with PMHx T1DM (on insulin pump), HTN, HLD, former smoker, MI, CAD s/p PCI to RCA and brachytherapy in Aug 2017, dCHF (last TTE- November 2017- mild-mod MR, mild AS, mild-mod TR EF40-45%) chronic LLE pain and edema in setting of severe PVD s/p L & R fem-pop bypass  graft,  multiple vascular surgery both leg w/ fem-pop bypass revisions , Subclavian vein stenosis left s/p stent, ESRD on HD (Tu/Thr/Sat) via L AVF with oliguria, CVA with memory deficit, depression who presents with shortness of breath.The patient has multiple admissions for DKA, most recently from 11/24 - 11/29. This past week, pt states she has had a cold with congestion, cough, runny nose. Denies fevers, chills, nausea, vomiting, chest pain, diarrhea, or constipation. Denies recent travel or sick contacts, but states she has a grandson who had a URI recently. This AM she woke up acutely short of breath which prompted her to come to the ED. She states she has been compliant with her home medications and her blood glucoses recently have been in the 200s and 300s. She states her  manages her insulin pump.CXR showed pulmonary edema; she was placed on BiPAP, given 80mg IV lasix. Labs were significant for a K+ of 6.8 so house renal was called for urgent dialysis. Lab work was also significant for a beta-hydroxybutyrate of 2.2, small serum acetone, blood glucose of 387, an anion gap of 22,  and pH of 7.3 on VBG. She was admitted to the MICU after emergent HD for further management of DKA./swallowing precautions swallowing precautions/pt with PMHx T1DM (on insulin pump), HTN, HLD, former smoker, MI, CAD s/p PCI to RCA and brachytherapy in Aug 2017, dCHF (last TTE- November 2017- mild-mod MR, mild AS, mild-mod TR EF40-45%) chronic LLE pain and edema in setting of severe PVD s/p L & R fem-pop bypass  graft,  multiple vascular surgery both leg w/ fem-pop bypass revisions , Subclavian vein stenosis left s/p stent, ESRD on HD (Tu/Thr/Sat) via L AVF with oliguria, CVA with memory deficit, depression who presents with shortness of breath.The patient has multiple admissions for DKA, most recently from 11/24 - 11/29. This past week, pt states she has had a cold with congestion, cough, runny nose. Denies fevers, chills, nausea, vomiting, chest pain, diarrhea, or constipation. Denies recent travel or sick contacts, but states she has a grandson who had a URI recently. This AM she woke up acutely short of breath which prompted her to come to the ED. She states she has been compliant with her home medications and her blood glucoses recently have been in the 200s and 300s. She states her  manages her insulin pump.CXR showed pulmonary edema; she was placed on BiPAP, given 80mg IV lasix. Labs were significant for a K+ of 6.8 so house renal was called for urgent dialysis. Lab work was also significant for a beta-hydroxybutyrate of 2.2, small serum acetone, blood glucose of 387, an anion gap of 22,  and pH of 7.3 on VBG. She was admitted to the MICU after emergent HD for further management of DKA./no known precautions/limitations

## 2017-12-20 NOTE — PROGRESS NOTE ADULT - SUBJECTIVE AND OBJECTIVE BOX
Patient is a 62y old  Female who presents with a chief complaint of Shortness of breath, DKA (18 Dec 2017 22:35)      SUBJECTIVE / OVERNIGHT EVENTS: Comfortable without new complaints.   Review of Systems  chest pain no  palpitations no  sob no  nausea no  headache no    MEDICATIONS  (STANDING):  aspirin enteric coated 81 milliGRAM(s) Oral daily  atorvastatin 20 milliGRAM(s) Oral at bedtime  cinacalcet 60 milliGRAM(s) Oral daily  clopidogrel Tablet 75 milliGRAM(s) Oral at bedtime  DULoxetine 60 milliGRAM(s) Oral daily  furosemide    Tablet 40 milliGRAM(s) Oral daily  heparin  Injectable 5000 Unit(s) SubCutaneous every 8 hours  hydrALAZINE 100 milliGRAM(s) Oral every 8 hours  insulin lispro (HumaLOG) Pump 1 Each SubCutaneous Continuous Pump  lisinopril 40 milliGRAM(s) Oral daily  metoprolol     tartrate 25 milliGRAM(s) Oral two times a day  sevelamer hydrochloride 2400 milliGRAM(s) Oral three times a day with meals  sodium chloride 0.9%. 1000 milliLiter(s) (50 mL/Hr) IV Continuous <Continuous>    MEDICATIONS  (PRN):  oxyCODONE    5 mG/acetaminophen 325 mG 1 Tablet(s) Oral every 6 hours PRN Severe Pain (7 - 10)          PHYSICAL EXAM:  GENERAL: NAD, well-developed  HEAD:  Atraumatic, Normocephalic  EYES: EOMI, PERRLA, conjunctiva and sclera clear  NECK: Supple, No JVD  CHEST/LUNG: Clear to auscultation bilaterally; No wheeze  HEART: Regular rate and rhythm; No murmurs, rubs, or gallops  ABDOMEN: Soft, Nontender, Nondistended; Bowel sounds present  EXTREMITIES:  2+ Peripheral Pulses, No clubbing, cyanosis, or edema  PSYCH: AAOx3  NEUROLOGY: non-focal  SKIN: No rashes or lesions    LABS:                        9.4    4.4   )-----------( 247      ( 20 Dec 2017 03:08 )             28.3     12-20    133<L>  |  96  |  29<H>  ----------------------------<  158<H>  4.6   |  25  |  4.29<H>    Ca    7.9<L>      20 Dec 2017 17:03  Phos  3.2     12-20  Mg     2.2     12-20    TPro  6.7  /  Alb  3.5  /  TBili  0.4  /  DBili  x   /  AST  17  /  ALT  8<L>  /  AlkPhos  165<H>  12-20    PT/INR - ( 19 Dec 2017 01:45 )   PT: 11.0 sec;   INR: 1.01 ratio         PTT - ( 19 Dec 2017 01:45 )  PTT:28.8 sec  CARDIAC MARKERS ( 19 Dec 2017 10:40 )  x     / 0.12 ng/mL / x     / x     / x      CARDIAC MARKERS ( 19 Dec 2017 01:45 )  x     / 0.11 ng/mL / 102 U/L / x     / 2.2 ng/mL  CARDIAC MARKERS ( 18 Dec 2017 19:46 )  x     / 0.10 ng/mL / x     / x     / x              RADIOLOGY & ADDITIONAL TESTS:    Imaging Personally Reviewed:    Consultant(s) Notes Reviewed:      Care Discussed with Consultants/Other Providers:

## 2017-12-20 NOTE — PHYSICAL THERAPY INITIAL EVALUATION ADULT - PERTINENT HX OF CURRENT PROBLEM, REHAB EVAL
Pt is a 62 year old female admitted to [facility] on 12/18/17 forShortness of breath, DKA Pt is a 62 year old female admitted to [facility] on 12/18/17 for Shortness of breath, DKA

## 2017-12-20 NOTE — ADVANCED PRACTICE NURSE CONSULT - RECOMMEDATIONS
Case discussed with Primary RN and RN advised to follow-up with obtaining provider signature for attestation form. Primary RN to f/u with BG monitoring, making sure pt consumes carb consistent meals, insulin pump site assessment, monitoring S/S hypo/hyperglycemia and tracking carb intake, meal/correction boluses. Insulin pump forms all completed and in chart. DSME provided regarding S/S, prevention and appropriate treatment of hyperglycemia and hypoglycemia (see educational flow-sheet).  Pt aware that she should only use hospital insulin and glucometer while in hospital.

## 2017-12-21 ENCOUNTER — TRANSCRIPTION ENCOUNTER (OUTPATIENT)
Age: 62
End: 2017-12-21

## 2017-12-21 VITALS
HEART RATE: 74 BPM | OXYGEN SATURATION: 97 % | RESPIRATION RATE: 18 BRPM | DIASTOLIC BLOOD PRESSURE: 53 MMHG | SYSTOLIC BLOOD PRESSURE: 148 MMHG | TEMPERATURE: 99 F

## 2017-12-21 LAB
ALBUMIN SERPL ELPH-MCNC: 3.2 G/DL — LOW (ref 3.3–5)
ALP SERPL-CCNC: 161 U/L — HIGH (ref 40–120)
ALT FLD-CCNC: 5 U/L — LOW (ref 10–45)
ANION GAP SERPL CALC-SCNC: 18 MMOL/L — HIGH (ref 5–17)
AST SERPL-CCNC: 14 U/L — SIGNIFICANT CHANGE UP (ref 10–40)
BILIRUB SERPL-MCNC: 0.4 MG/DL — SIGNIFICANT CHANGE UP (ref 0.2–1.2)
BUN SERPL-MCNC: 33 MG/DL — HIGH (ref 7–23)
CALCIUM SERPL-MCNC: 8 MG/DL — LOW (ref 8.4–10.5)
CHLORIDE SERPL-SCNC: 99 MMOL/L — SIGNIFICANT CHANGE UP (ref 96–108)
CO2 SERPL-SCNC: 19 MMOL/L — LOW (ref 22–31)
CREAT SERPL-MCNC: 4.97 MG/DL — HIGH (ref 0.5–1.3)
GLUCOSE BLDC GLUCOMTR-MCNC: 106 MG/DL — HIGH (ref 70–99)
GLUCOSE BLDC GLUCOMTR-MCNC: 157 MG/DL — HIGH (ref 70–99)
GLUCOSE BLDC GLUCOMTR-MCNC: 198 MG/DL — HIGH (ref 70–99)
GLUCOSE BLDC GLUCOMTR-MCNC: 78 MG/DL — SIGNIFICANT CHANGE UP (ref 70–99)
GLUCOSE SERPL-MCNC: 196 MG/DL — HIGH (ref 70–99)
HCT VFR BLD CALC: 26.7 % — LOW (ref 34.5–45)
HGB BLD-MCNC: 8.5 G/DL — LOW (ref 11.5–15.5)
MAGNESIUM SERPL-MCNC: 2.1 MG/DL — SIGNIFICANT CHANGE UP (ref 1.6–2.6)
MCHC RBC-ENTMCNC: 31.8 GM/DL — LOW (ref 32–36)
MCHC RBC-ENTMCNC: 32.2 PG — SIGNIFICANT CHANGE UP (ref 27–34)
MCV RBC AUTO: 101.1 FL — HIGH (ref 80–100)
PHOSPHATE SERPL-MCNC: 3.4 MG/DL — SIGNIFICANT CHANGE UP (ref 2.5–4.5)
PLATELET # BLD AUTO: 255 K/UL — SIGNIFICANT CHANGE UP (ref 150–400)
POTASSIUM SERPL-MCNC: 4.7 MMOL/L — SIGNIFICANT CHANGE UP (ref 3.5–5.3)
POTASSIUM SERPL-SCNC: 4.7 MMOL/L — SIGNIFICANT CHANGE UP (ref 3.5–5.3)
PROT SERPL-MCNC: 6 G/DL — SIGNIFICANT CHANGE UP (ref 6–8.3)
RBC # BLD: 2.64 M/UL — LOW (ref 3.8–5.2)
RBC # FLD: 12.7 % — SIGNIFICANT CHANGE UP (ref 10.3–14.5)
SODIUM SERPL-SCNC: 136 MMOL/L — SIGNIFICANT CHANGE UP (ref 135–145)
WBC # BLD: 4.32 K/UL — SIGNIFICANT CHANGE UP (ref 3.8–10.5)
WBC # FLD AUTO: 4.32 K/UL — SIGNIFICANT CHANGE UP (ref 3.8–10.5)

## 2017-12-21 PROCEDURE — 82803 BLOOD GASES ANY COMBINATION: CPT

## 2017-12-21 PROCEDURE — 80048 BASIC METABOLIC PNL TOTAL CA: CPT

## 2017-12-21 PROCEDURE — 80053 COMPREHEN METABOLIC PANEL: CPT

## 2017-12-21 PROCEDURE — 86709 HEPATITIS A IGM ANTIBODY: CPT

## 2017-12-21 PROCEDURE — 83605 ASSAY OF LACTIC ACID: CPT

## 2017-12-21 PROCEDURE — 85730 THROMBOPLASTIN TIME PARTIAL: CPT

## 2017-12-21 PROCEDURE — 86803 HEPATITIS C AB TEST: CPT

## 2017-12-21 PROCEDURE — 87340 HEPATITIS B SURFACE AG IA: CPT

## 2017-12-21 PROCEDURE — 84443 ASSAY THYROID STIM HORMONE: CPT

## 2017-12-21 PROCEDURE — 82009 KETONE BODYS QUAL: CPT

## 2017-12-21 PROCEDURE — 84484 ASSAY OF TROPONIN QUANT: CPT

## 2017-12-21 PROCEDURE — 83735 ASSAY OF MAGNESIUM: CPT

## 2017-12-21 PROCEDURE — 85610 PROTHROMBIN TIME: CPT

## 2017-12-21 PROCEDURE — 87486 CHLMYD PNEUM DNA AMP PROBE: CPT

## 2017-12-21 PROCEDURE — 99261: CPT

## 2017-12-21 PROCEDURE — 86705 HEP B CORE ANTIBODY IGM: CPT

## 2017-12-21 PROCEDURE — 99291 CRITICAL CARE FIRST HOUR: CPT | Mod: 25

## 2017-12-21 PROCEDURE — 85027 COMPLETE CBC AUTOMATED: CPT

## 2017-12-21 PROCEDURE — 87581 M.PNEUMON DNA AMP PROBE: CPT

## 2017-12-21 PROCEDURE — 82330 ASSAY OF CALCIUM: CPT

## 2017-12-21 PROCEDURE — 86706 HEP B SURFACE ANTIBODY: CPT

## 2017-12-21 PROCEDURE — 84100 ASSAY OF PHOSPHORUS: CPT

## 2017-12-21 PROCEDURE — 84132 ASSAY OF SERUM POTASSIUM: CPT

## 2017-12-21 PROCEDURE — 96374 THER/PROPH/DIAG INJ IV PUSH: CPT

## 2017-12-21 PROCEDURE — 85014 HEMATOCRIT: CPT

## 2017-12-21 PROCEDURE — 93005 ELECTROCARDIOGRAM TRACING: CPT

## 2017-12-21 PROCEDURE — 87798 DETECT AGENT NOS DNA AMP: CPT

## 2017-12-21 PROCEDURE — 82962 GLUCOSE BLOOD TEST: CPT

## 2017-12-21 PROCEDURE — 82010 KETONE BODYS QUAN: CPT

## 2017-12-21 PROCEDURE — 82550 ASSAY OF CK (CPK): CPT

## 2017-12-21 PROCEDURE — 99232 SBSQ HOSP IP/OBS MODERATE 35: CPT

## 2017-12-21 PROCEDURE — 82553 CREATINE MB FRACTION: CPT

## 2017-12-21 PROCEDURE — 82947 ASSAY GLUCOSE BLOOD QUANT: CPT

## 2017-12-21 PROCEDURE — 87633 RESP VIRUS 12-25 TARGETS: CPT

## 2017-12-21 PROCEDURE — 96375 TX/PRO/DX INJ NEW DRUG ADDON: CPT

## 2017-12-21 PROCEDURE — 97161 PT EVAL LOW COMPLEX 20 MIN: CPT

## 2017-12-21 PROCEDURE — 84295 ASSAY OF SERUM SODIUM: CPT

## 2017-12-21 PROCEDURE — 82435 ASSAY OF BLOOD CHLORIDE: CPT

## 2017-12-21 PROCEDURE — 71045 X-RAY EXAM CHEST 1 VIEW: CPT

## 2017-12-21 PROCEDURE — 86704 HEP B CORE ANTIBODY TOTAL: CPT

## 2017-12-21 RX ORDER — CINACALCET 30 MG/1
1 TABLET, FILM COATED ORAL
Qty: 0 | Refills: 0 | COMMUNITY
Start: 2017-12-21

## 2017-12-21 RX ORDER — METOPROLOL TARTRATE 50 MG
1 TABLET ORAL
Qty: 0 | Refills: 0 | COMMUNITY
Start: 2017-12-21

## 2017-12-21 RX ORDER — ERYTHROPOIETIN 10000 [IU]/ML
10000 INJECTION, SOLUTION INTRAVENOUS; SUBCUTANEOUS ONCE
Qty: 0 | Refills: 0 | Status: COMPLETED | OUTPATIENT
Start: 2017-12-21 | End: 2017-12-21

## 2017-12-21 RX ORDER — FUROSEMIDE 40 MG
1 TABLET ORAL
Qty: 0 | Refills: 0 | COMMUNITY
Start: 2017-12-21

## 2017-12-21 RX ORDER — METOPROLOL TARTRATE 50 MG
1 TABLET ORAL
Qty: 0 | Refills: 0 | COMMUNITY

## 2017-12-21 RX ORDER — CINACALCET 30 MG/1
1 TABLET, FILM COATED ORAL
Qty: 0 | Refills: 0 | COMMUNITY

## 2017-12-21 RX ORDER — FUROSEMIDE 40 MG
1 TABLET ORAL
Qty: 0 | Refills: 0 | COMMUNITY

## 2017-12-21 RX ADMIN — CINACALCET 60 MILLIGRAM(S): 30 TABLET, FILM COATED ORAL at 12:08

## 2017-12-21 RX ADMIN — Medication 81 MILLIGRAM(S): at 12:08

## 2017-12-21 RX ADMIN — ATORVASTATIN CALCIUM 20 MILLIGRAM(S): 80 TABLET, FILM COATED ORAL at 21:40

## 2017-12-21 RX ADMIN — CLOPIDOGREL BISULFATE 75 MILLIGRAM(S): 75 TABLET, FILM COATED ORAL at 21:40

## 2017-12-21 RX ADMIN — SEVELAMER CARBONATE 2400 MILLIGRAM(S): 2400 POWDER, FOR SUSPENSION ORAL at 18:05

## 2017-12-21 RX ADMIN — Medication 25 MILLIGRAM(S): at 18:05

## 2017-12-21 RX ADMIN — Medication 100 MILLIGRAM(S): at 21:40

## 2017-12-21 RX ADMIN — SEVELAMER CARBONATE 2400 MILLIGRAM(S): 2400 POWDER, FOR SUSPENSION ORAL at 09:05

## 2017-12-21 RX ADMIN — OXYCODONE AND ACETAMINOPHEN 1 TABLET(S): 5; 325 TABLET ORAL at 00:02

## 2017-12-21 RX ADMIN — Medication 100 MILLIGRAM(S): at 06:32

## 2017-12-21 RX ADMIN — Medication 40 MILLIGRAM(S): at 06:27

## 2017-12-21 RX ADMIN — ERYTHROPOIETIN 10000 UNIT(S): 10000 INJECTION, SOLUTION INTRAVENOUS; SUBCUTANEOUS at 17:10

## 2017-12-21 RX ADMIN — LISINOPRIL 40 MILLIGRAM(S): 2.5 TABLET ORAL at 18:05

## 2017-12-21 RX ADMIN — SEVELAMER CARBONATE 2400 MILLIGRAM(S): 2400 POWDER, FOR SUSPENSION ORAL at 12:08

## 2017-12-21 RX ADMIN — Medication 25 MILLIGRAM(S): at 06:32

## 2017-12-21 RX ADMIN — DULOXETINE HYDROCHLORIDE 60 MILLIGRAM(S): 30 CAPSULE, DELAYED RELEASE ORAL at 12:08

## 2017-12-21 RX ADMIN — Medication 100 MILLIGRAM(S): at 18:06

## 2017-12-21 NOTE — DISCHARGE NOTE ADULT - MEDICATION SUMMARY - MEDICATIONS TO TAKE
I will START or STAY ON the medications listed below when I get home from the hospital:    aspirin 81 mg oral delayed release tablet  -- 1 tab(s) by mouth once a day  -- Indication: For cad    oxyCODONE-acetaminophen 5 mg-325 mg oral tablet  -- 1 tab(s) by mouth every 6 hours, As needed, Severe Pain (7 - 10)  -- Indication: For pain    lisinopril 40 mg oral tablet  -- 1 tab(s) by mouth once a day  -- Indication: For htn    DULoxetine 60 mg oral delayed release capsule  -- 1 cap(s) by mouth every other day  -- Indication: For neuropathy    HumaLOG 100 units/mL subcutaneous solution  -- 150 unit(s) subcutaneous daily via pump  -- Indication: For diabetes    atorvastatin 20 mg oral tablet  -- 1 tab(s) by mouth once a day (at bedtime)  -- Indication: For diabetes    clopidogrel 75 mg oral tablet  -- 1 tab(s) by mouth once a day (at bedtime)  -- Indication: For cad    metoprolol tartrate 25 mg oral tablet  -- 1 tab(s) by mouth 2 times a day  -- Indication: For htn    cinacalcet 60 mg oral tablet  -- 1 tab(s) by mouth once a day  -- Indication: For ESRD (end stage renal disease)    furosemide 40 mg oral tablet  -- 1 tab(s) by mouth once a day  -- Indication: For htn    sevelamer hydrochloride 800 mg oral tablet  -- 2 tab(s) by mouth 3 times a day (with meals)  -- Indication: For ESRD (end stage renal disease)    hydrALAZINE 25 mg oral tablet  -- 4 tab(s) by mouth every 8 hours  -- Indication: For htn

## 2017-12-21 NOTE — DISCHARGE NOTE ADULT - PLAN OF CARE
resolution of symptoms HgA1C this admission - 8.5  Make sure you get your HgA1c checked every three months.  If you take oral diabetes medications, check your blood glucose two times a day.  If you take insulin, check your blood glucose before meals and at bedtime.  It's important not to skip any meals.  Keep a log of your blood glucose results and always take it with you to your doctor appointments.  Keep a list of your current medications including injectables and over the counter medications and bring this medication list with you to all your doctor appointments.  If you have not seen your ophthalmologist this year call for appointment.  Check your feet daily for redness, sores, or openings. Do not self treat. If no improvement in two days call your primary care physician for an appointment.  Low blood sugar (hypoglycemia) is a blood sugar below 70mg/dl. Check your blood sugar if you feel signs/symptoms of hypoglycemia. If your blood sugar is below 70 take 15 grams of carbohydrates (ex 4 oz of apple juice, 3-4 glucose tablets, or 4-6 oz of regular soda) wait 15 minutes and repeat blood sugar to make sure it comes up above 70.  If your blood sugar is above 70 and you are due for a meal, have a meal.  If you are not due for a meal have a snack.  This snack helps keeps your blood sugar at a safe range. Take medications for your blood pressure as recommended.  Eat a heart healthy diet that is low in saturated fats and salt, and includes whole grains, fruits, vegetables and lean protein   Exercise regularly (consult with your physician or cardiologist first); maintain a heart healthy weight.   If you smoke - quit (A resource to help you stop smoking is the [facility] – phone number [phone].). Continue to follow with your primary physician or cardiologist.   Seek medical help for dizziness, Lightheadedness, Blurry vision, Headache, Chest pain, Shortness of breath  Follow up with your medical doctor to establish long term blood pressure treatment goals. Avoid taking (NSAIDs) - (ex: Ibuprofen, Advil, Celebrex, Naprosyn)  Avoid taking any nephrotoxic agents (can harm kidneys) - Intravenous contrast for diagnostic testing, combination cold medications.  Have all medications adjusted for your renal function by your Health Care Provider.  Blood pressure control is important.  Take all medication as prescribed.

## 2017-12-21 NOTE — PROGRESS NOTE ADULT - SUBJECTIVE AND OBJECTIVE BOX
Patient is a 62y old  Female who presents with a chief complaint of Shortness of breath, DKA (21 Dec 2017 12:57)      SUBJECTIVE / OVERNIGHT EVENTS: Comfortable without new complaints.   Review of Systems  chest pain no  palpitations no  sob no  nausea no  headache no    MEDICATIONS  (STANDING):  aspirin enteric coated 81 milliGRAM(s) Oral daily  atorvastatin 20 milliGRAM(s) Oral at bedtime  cinacalcet 60 milliGRAM(s) Oral daily  clopidogrel Tablet 75 milliGRAM(s) Oral at bedtime  DULoxetine 60 milliGRAM(s) Oral daily  epoetin azalea Injectable 64786 Unit(s) IV Push once  furosemide    Tablet 40 milliGRAM(s) Oral daily  heparin  Injectable 5000 Unit(s) SubCutaneous every 8 hours  hydrALAZINE 100 milliGRAM(s) Oral every 8 hours  insulin lispro (HumaLOG) Pump 1 Each SubCutaneous Continuous Pump  lisinopril 40 milliGRAM(s) Oral daily  metoprolol     tartrate 25 milliGRAM(s) Oral two times a day  sevelamer hydrochloride 2400 milliGRAM(s) Oral three times a day with meals  sodium chloride 0.9%. 1000 milliLiter(s) (50 mL/Hr) IV Continuous <Continuous>    MEDICATIONS  (PRN):  oxyCODONE    5 mG/acetaminophen 325 mG 1 Tablet(s) Oral every 6 hours PRN Severe Pain (7 - 10)          PHYSICAL EXAM:  GENERAL: NAD, well-developed  HEAD:  Atraumatic, Normocephalic  EYES: EOMI, PERRLA, conjunctiva and sclera clear  NECK: Supple, No JVD  CHEST/LUNG: Clear to auscultation bilaterally; No wheeze  HEART: Regular rate and rhythm; No murmurs, rubs, or gallops  ABDOMEN: Soft, Nontender, Nondistended; Bowel sounds present  EXTREMITIES:  2+ Peripheral Pulses, No clubbing, cyanosis, or edema  PSYCH: AAOx3  NEUROLOGY: non-focal  SKIN: No rashes or lesions    LABS:                        8.5    4.32  )-----------( 255      ( 21 Dec 2017 09:10 )             26.7     12-21    136  |  99  |  33<H>  ----------------------------<  196<H>  4.7   |  19<L>  |  4.97<H>    Ca    8.0<L>      21 Dec 2017 09:04  Phos  3.4     12-21  Mg     2.1     12-21    TPro  6.0  /  Alb  3.2<L>  /  TBili  0.4  /  DBili  x   /  AST  14  /  ALT  5<L>  /  AlkPhos  161<H>  12-21              RADIOLOGY & ADDITIONAL TESTS:    Imaging Personally Reviewed:    Consultant(s) Notes Reviewed:      Care Discussed with Consultants/Other Providers:

## 2017-12-21 NOTE — DISCHARGE NOTE ADULT - CARE PROVIDER_API CALL
Pina Ley (SUSAN), EndocrinologyMetabDiabetes  [address] [place] [address]  Phone: [phone]  Fax: [phone]

## 2017-12-21 NOTE — DISCHARGE NOTE ADULT - PATIENT PORTAL LINK FT
“You can access the Follow[facility] Patient Portal, offered by [facility], by registering with the following website: [email or web address]”

## 2017-12-21 NOTE — PROGRESS NOTE ADULT - PROVIDER SPECIALTY LIST ADULT
Endocrinology
Endocrinology
Internal Medicine
Internal Medicine
Nephrology
MICU
MICU

## 2017-12-21 NOTE — PROGRESS NOTE ADULT - ASSESSMENT
ASSESSMENT: Pt is a 62F with PMHx T1DM (on insulin pump), HTN, HLD, MI, CAD s/p PCI to RCA and brachytherapy in Aug 2017, dCHF (last TTE- July 2017- mild MR, mild AS, mild-mod TR EF65%) chronic LLE pain and edema in setting of severe PVD s/p L & R fem-pop bypass  graft, ESRD on HD (Tu/Thr/Sat) via L AVF with oliguria, CVA with memory deficit, who presents with shortness of breath secondary to fluid overload found to be in mild DKA. Patient's gap was improving yesterday; however this morning patient has mild DKA.     PLAN:    #Neuro  -AAOx4 on admission, currently mentating at baseline  -cont to monitor mental status  -has hx of CVA, c/w home atorvastatin    #Pulmonary  -pt p/w SOB, most likely 2/2 fluid overload as CXR showed pulmonary edema. Saturating well on nasal cannula  -now s/p 2L fluid removal in HD, BP controlled and acceptable  -RVP negative    #Cardiac  -has hx of dHF, TTE on 11/27 showed EF 40-45%, mild-mod mitral regurg, mild-mod LVSD, mild concentric LVH, moderate pulm HTN  -c/w lasix for diuresis  -c/w home lisinopril, hydralazine for HTN  -c/w metoprolol 25mg BID  -c/w home ASA/plavix for CAD    #Endo  -pt in DKA on presentation with elevated AG, beta-hydroxybutyrate, and small serum acetone. Overnight gap closed, blood sugars acceptable, and beta-hydroxybutyrate now zero  - This morning AG of 24 with sugars>400, and betahydroxybutyrate is 5.   - patient refusing insulin gtt currently, endo is following pt and recommending the insulin pump.   If sugars remain elevated, insulin gtt will be starte.d  -Endo following, f/u recc  -BMP Q4H    #Heme  -anemic on presentation, stable. At baseline per previous hospital records  -follow CBC    #ID  -has URI sx on admission, now improved  -afebrile, no leukocytosis, RVP negative  -cont to monitor off abx    #Renal  -has ESRD, on dialysis Tu/Thr/Sat. Now s/p urgent HD for hyperkalemia, fluid overload  -renal following, appreciate recs  -Trend BMP, monitor UOP  -c/w home sensipar, renagel    #FEN/GI  -NPO in accordance with DKA protocol    #DVT ppx  -heparin subQ    #GOC  Full code
ASSESSMENT: Pt is a 62F with PMHx T1DM (on insulin pump), HTN, HLD, MI, CAD s/p PCI to RCA and brachytherapy in Aug 2017, dCHF (last TTE- July 2017- mild MR, mild AS, mild-mod TR EF65%) chronic LLE pain and edema in setting of severe PVD s/p L & R fem-pop bypass  graft, ESRD on HD (Tu/Thr/Sat) via L AVF with oliguria, CVA with memory deficit, who presents with shortness of breath secondary to fluid overload found to be in mild DKA.    PLAN:    #Neuro  -AAOx4 on admission, currently mentating at baseline  -cont to monitor mental status  -has hx of CVA, c/w home atorvastatin    #Pulmonary  -pt p/w SOB, most likely 2/2 fluid overload as CXR showed pulmonary edema. Saturating well on nasal cannula  -now s/p 2L fluid removal in HD, BP controlled and acceptable  -RVP negative    #Cardiac  -has hx of dHF, TTE on 11/27 showed EF 40-45%, mild-mod mitral regurg, mild-mod LVSD, mild concentric LVH, moderate pulm HTN  -f/u BNP, hold IVF for now  -c/w lasix for diuresis  -c/w home lisinopril, hydralazine for HTN  -c/w metoprolol 25mg BID  -c/w home ASA/plavix for CAD    #Endo  -pt in DKA on presentation with elevated AG, beta-hydroxybutyrate, and small serum acetone. Overnight gap closed, blood sugars acceptable, and beta-hydroxybutyrate now zero  -Endo following, f/u recs  -insulin gtt, check BS Q1H  -BMP Q4H    #Heme  -anemic on presentation, stable. At baseline per previous hospital records  -follow CBC    #ID  -has URI sx on admission, now improved  -afebrile, no leukocytosis, RVP negative  -cont to monitor off abx    #Renal  -has ESRD, on dialysis Tu/Thr/Sat. Now s/p urgent HD for hyperkalemia, fluid overload  -renal following, appreciate recs  -Trend BMP, monitor UOP  -c/w home sensipar, renagel    #FEN/GI  -NPO in accordance with DKA protocol    #DVT ppx  -heparin subQ    #GOC  Full code    Ivory Casey MD PGY1  Pager [phone]
61 y/o F w/h/o uncontrolled T1DM with multiple complications and comorbidities including CV disease and ESRD on HD. Here with SOB, mild DKA and fluid retention requiring urgent HD. Pt now on pump-self managing w/out assistance. No further DKA. Pt self-managing pump w/out issue at this time-BG values at/close to goal.
61 yo F with hx DM, dka esrd cad htn PAD presented with sob and chf. also with hyperglycemia and + acetone now.   1- ESRD  2- hyperkalemia  on admission   3- DKA on admission   4- chf     fluid status improving  hd am   cont with insulin  endo follow up given multiple admissions for DKA in this pt   SHPT cont with sensipar 60mg daily
62 f with  DKA- continue Insulin, endocrine follow.  ESRD- continue HD  CAD,HTN- stable.  s/p CVA- stable.  PT  Pierce Viera MD pager [phone]
62 f with  DKA- resolved. Insulin pump, endocrine follow.  ESRD- continue HD  CAD,HTN- stable.  s/p CVA- stable.  PT  DC home with close follow with endocrine/ PMD/ Cardiology in 3-4 days.  Pierce Viera MD pager [phone]
imp/suggest: ESRD      Hemodialysis Prescription:  	Access: avf   	Dialyzer: revaclear 300  	Blood Flow (mL/Min): 400  	Dialysate Flow (mL/Min): 600  	Target UF (Liters): 2liter   	Treatment Time: 3.5   	Potassium: 2k   	Calcium: 2.5  	  YOLANDA    Vitamin D
61 y/o F w/h/o uncontrolled T1DM with multiple complications and comorbidities including CV disease and ESRD on HD. Here with SOB, mild DKA and fluid retention requiring urgent HD. Remains in MICU. Pt with fasting hyperglycemia this am and received SQ insulin then BMP showed back on DKA> pt refusing to go back on insulin drip. Agreed to bolus with insulin pump and don't eat until AG closes. BMP at 1pm negative for DKA. Will keep close POC testing and bolus with insulin pump. Pt asymptomatic for DKA. Waiting for 5pm POC after pt eats and gives meal time bolus.  Pt agreed if AG opens again she will need to go back on insulin drip.    Saw pt x2 today. Spent over 1 hour reviewing  labs/POC and discussing plan of care with pt/team.

## 2017-12-21 NOTE — PROGRESS NOTE ADULT - PROBLEM SELECTOR PLAN 1
-test BG AC/HS/2AM  -please document carb intake/bolus w/each meal
-Test BG at 5pm. AC/HS and 2am  -Can continue use of insulin pump for now  -Insulin pump site change due 12/23  -Plan discussed with pt/team.  Contact info: [phone] (24/7). pager [phone]

## 2017-12-21 NOTE — DISCHARGE NOTE ADULT - VISION (WITH CORRECTIVE LENSES IF THE PATIENT USUALLY WEARS THEM):
uses eyeglasses/Partially impaired: cannot see medication labels or newsprint, but can see obstacles in path, and the surrounding layout; can count fingers at arm's length

## 2017-12-21 NOTE — DISCHARGE NOTE ADULT - NS AS DC FOLLOWUP STROKE INST
Stroke (includes: TIA/SAH/ICH/Ischemic Stroke)/Heart Failure/Smoking Cessation Heart Failure/Stroke (includes: TIA/SAH/ICH/Ischemic Stroke)

## 2017-12-21 NOTE — DISCHARGE NOTE ADULT - HOSPITAL COURSE
63 y/o F w/h/o uncontrolled T1DM with multiple complications and comorbidities including CV disease and ESRD on HD. Here with SOB, mild DKA and fluid retention requiring urgent HD. Pt now on pump-self managing w/out assistance. No further DKA. Pt self-managing pump w/out issue at this time-BG values at/close to goal. Pt has follow up appointment with Endo Dr. Ley next month. d/c home

## 2017-12-21 NOTE — PROGRESS NOTE ADULT - PROBLEM SELECTOR PLAN 2
-c/w current insulin pump settings. Reviewed bolus history/pump settings.   -Next site change 12/23  -has f/u with private endo Dr Ley next month  -can be discharged on current pump settings  -discussed w/pt and NP
-Continue insulin pump at present insulin pump settings  -If back on DKA pt agreeable to restart insulin drip >MICU team aware  -For any issues with insulin pump please contact endocrine at  (24/7)  -Plan discussed with pt/team.  Contact info: [phone] (24/7). pager [phone]

## 2017-12-21 NOTE — PROGRESS NOTE ADULT - PROBLEM/PLAN-2
[facility] Emergency Department    [place]  [address] [address]    Phone:  [phone]    Fax:  [phone] [facility]   MRN: [id number]    Department:  [facility] Emergency Department   Date of Visit:  2/
IF THERE IS ANY CHANGE OR WORSENING OF YOUR CONDITION, CALL YOUR PRIMARY CARE PHYSICIAN AT ONCE OR RETURN IMMEDIATELY TO THE EMERGENCY DEPARTMENT.     If you have been prescribed any medication(s), please fill your prescription right away and begin taking t
DISPLAY PLAN FREE TEXT
DISPLAY PLAN FREE TEXT

## 2017-12-21 NOTE — DISCHARGE NOTE ADULT - CARE PLAN
Principal Discharge DX:	Type 1 diabetes mellitus with ketoacidosis without coma  Goal:	resolution of symptoms  Instructions for follow-up, activity and diet:	HgA1C this admission - 8.5  Make sure you get your HgA1c checked every three months.  If you take oral diabetes medications, check your blood glucose two times a day.  If you take insulin, check your blood glucose before meals and at bedtime.  It's important not to skip any meals.  Keep a log of your blood glucose results and always take it with you to your doctor appointments.  Keep a list of your current medications including injectables and over the counter medications and bring this medication list with you to all your doctor appointments.  If you have not seen your ophthalmologist this year call for appointment.  Check your feet daily for redness, sores, or openings. Do not self treat. If no improvement in two days call your primary care physician for an appointment.  Low blood sugar (hypoglycemia) is a blood sugar below 70mg/dl. Check your blood sugar if you feel signs/symptoms of hypoglycemia. If your blood sugar is below 70 take 15 grams of carbohydrates (ex 4 oz of apple juice, 3-4 glucose tablets, or 4-6 oz of regular soda) wait 15 minutes and repeat blood sugar to make sure it comes up above 70.  If your blood sugar is above 70 and you are due for a meal, have a meal.  If you are not due for a meal have a snack.  This snack helps keeps your blood sugar at a safe range.  Secondary Diagnosis:	Essential hypertension  Instructions for follow-up, activity and diet:	Take medications for your blood pressure as recommended.  Eat a heart healthy diet that is low in saturated fats and salt, and includes whole grains, fruits, vegetables and lean protein   Exercise regularly (consult with your physician or cardiologist first); maintain a heart healthy weight.   If you smoke - quit (A resource to help you stop smoking is the [facility] – phone number [phone].). Continue to follow with your primary physician or cardiologist.   Seek medical help for dizziness, Lightheadedness, Blurry vision, Headache, Chest pain, Shortness of breath  Follow up with your medical doctor to establish long term blood pressure treatment goals.  Secondary Diagnosis:	ESRD on hemodialysis  Instructions for follow-up, activity and diet:	Avoid taking (NSAIDs) - (ex: Ibuprofen, Advil, Celebrex, Naprosyn)  Avoid taking any nephrotoxic agents (can harm kidneys) - Intravenous contrast for diagnostic testing, combination cold medications.  Have all medications adjusted for your renal function by your Health Care Provider.  Blood pressure control is important.  Take all medication as prescribed.

## 2017-12-21 NOTE — DISCHARGE NOTE ADULT - NS AS DC HF EDUCATION INSTRUCTIONS
Monitor Weight Daily/Report weight gain of 2 or more pounds in one day or 3 or more pounds in one week, worsening shortness of breath, fatigue, weakness, increased swelling of hands and feet to primary care provider/Low salt diet/Call Primary Care Provider for follow-up after discharge/Activities as tolerated

## 2017-12-21 NOTE — PROGRESS NOTE ADULT - SUBJECTIVE AND OBJECTIVE BOX
[facility]   [phone]  DIALYSIS NOTE  Chief Complaint: ESRD/Ongoing hemodialysis requirement.   24 hour events/subjective:      states breathing is better.       ALLERGIES & MEDICATIONS  --------------------------------------------------------------------------------  Allergies    No Known Allergies    Intolerances      Standing Inpatient Medications  aspirin enteric coated 81 milliGRAM(s) Oral daily  atorvastatin 20 milliGRAM(s) Oral at bedtime  cinacalcet 60 milliGRAM(s) Oral daily  clopidogrel Tablet 75 milliGRAM(s) Oral at bedtime  DULoxetine 60 milliGRAM(s) Oral daily  furosemide    Tablet 40 milliGRAM(s) Oral daily  heparin  Injectable 5000 Unit(s) SubCutaneous every 8 hours  hydrALAZINE 100 milliGRAM(s) Oral every 8 hours  insulin lispro (HumaLOG) Pump 1 Each SubCutaneous Continuous Pump  lisinopril 40 milliGRAM(s) Oral daily  metoprolol     tartrate 25 milliGRAM(s) Oral two times a day  sevelamer hydrochloride 2400 milliGRAM(s) Oral three times a day with meals  sodium chloride 0.9%. 1000 milliLiter(s) IV Continuous <Continuous>    PRN Inpatient Medications  oxyCODONE    5 mG/acetaminophen 325 mG 1 Tablet(s) Oral every 6 hours PRN      REVIEW OF SYSTEMS  --------------------------------------------------------------------------------    no fever or chill  no cp or palp   no sob or cough   no N/V/D/ no abd pain   ext no edema no pain         VITALS/PHYSICAL EXAM  --------------------------------------------------------------------------------  T(C): 36.8 (12-21-17 @ 17:03), Max: 37 (12-21-17 @ 12:03)  HR: 98 (12-21-17 @ 17:03) (70 - 98)  BP: 1/132 (12-21-17 @ 17:03) (1/132 - 174/73)  RR: 18 (12-21-17 @ 17:03) (17 - 18)  SpO2: 98% (12-21-17 @ 17:03) (95% - 98%)  Wt(kg): --        12-20-17 @ 07:01  -  12-21-17 @ 07:00  --------------------------------------------------------  IN: 810 mL / OUT: 0 mL / NET: 810 mL    12-21-17 @ 07:01  -  12-21-17 @ 17:42  --------------------------------------------------------  IN: 240 mL / OUT: 2000 mL / NET: -1760 mL      Physical Exam:  		    	Gen: alert oriented place person and date   	Pulm: Decreased breath sounds b/l bases no rales or ronchi  	CV: RRR, S1/S2  	Abd: +BS, soft, nontender/nondistended  	Extremity: No cyanosis,1+  edema no clubbing  	-  	    LABS/STUDIES  --------------------------------------------------------------------------------              8.5    4.32  >-----------<  255      [12-21-17 @ 09:10]              26.7     136  |  99  |  33  ----------------------------<  196      [12-21-17 @ 09:04]  4.7   |  19  |  4.97        Ca     8.0     [12-21-17 @ 09:04]      Mg     2.1     [12-21-17 @ 09:04]      Phos  3.4     [12-21-17 @ 09:04]    TPro  6.0  /  Alb  3.2  /  TBili  0.4  /  DBili  x   /  AST  14  /  ALT  5   /  AlkPhos  161  [12-21-17 @ 09:04]                  imp/suggest: ESRD      Hemodialysis Prescription:  	Access: avf  	Dialyzer: revaclear 300  	Blood Flow (mL/Min): 400  	Dialysate Flow (mL/Min): 600  	Target UF (Liters): 2 liter  	Treatment Time: 210 m   	Potassium: 2k  	Calcium: 2.5  	dc ivf   YOLANDA    Vitamin D     continue with hd   see hd flow sheet

## 2017-12-21 NOTE — PROGRESS NOTE ADULT - SUBJECTIVE AND OBJECTIVE BOX
Diabetes Follow up note:  Interval Hx:  61 y/o F w/h/o uncontrolled T1DM with multiplt complications and comorbidities including CV disease and ESRD on HD. Well known to DM team from frequent admissions. Here with SOB, mild DKA and fluid retention requiring urgent HD. Pt transitioned back to insulin pump yesterday, now on floor. Pt seen at bedside. Feels much better. Appetite improved. No hypoglycemia. Pt changed pump site yesterday.     Review of Systems:  General: "feel better and want to go home"  GI: Tolerating POs without any N/V/D/ABD PAIN.  CV: No CP/SOB  ENDO: No S&Sx of hypoglycemia  MEDS:  atorvastatin 20 milliGRAM(s) Oral at bedtime  insulin lispro (HumaLOG) Pump 1 Each SubCutaneous Continuous Pump  settings:  Basal  12 AM - 0.275 units/hour  5AM – 0.375 units/hour  Total basal 8.5 units   ICR  12 am-12am – 1:15  ISF   12am - 60  7am – 40  6pm - 60  BGT  12am - 110-130 mg/dl  8am - 100-120 mg/dl  Insulin duration: 6 hours    Allergies    No Known Allergies        PE:  General:  Female lying in bed. NAD.   Vital Signs Last 24 Hrs  T(C): 36.8 (21 Dec 2017 04:37), Max: 36.9 (20 Dec 2017 17:26)  T(F): 98.3 (21 Dec 2017 04:37), Max: 98.4 (20 Dec 2017 17:26)  HR: 73 (21 Dec 2017 04:37) (66 - 74)  BP: 146/80 (21 Dec 2017 04:37) (146/80 - 185/78)  BP(mean): 102 (20 Dec 2017 17:00) (102 - 113)  RR: 18 (21 Dec 2017 04:37) (16 - 22)  SpO2: 95% (21 Dec 2017 04:37) (77% - 99%)  Abd: Soft, NT,ND, L abd pump site c/d/i  Extremities: Warm  Neuro: A&O X3    LABS:    POCT Blood Glucose.: 198 mg/dL (12-21-17 @ 08:19)  POCT Blood Glucose.: 130 mg/dL (12-20-17 @ 21:07)  POCT Blood Glucose.: 159 mg/dL (12-20-17 @ 16:58)  POCT Blood Glucose.: 220 mg/dL (12-20-17 @ 14:05)  POCT Blood Glucose.: 285 mg/dL (12-20-17 @ 13:09)  POCT Blood Glucose.: 328 mg/dL (12-20-17 @ 11:50)  POCT Blood Glucose.: 457 mg/dL (12-20-17 @ 09:06)  POCT Blood Glucose.: 422 mg/dL (12-20-17 @ 09:05)  POCT Blood Glucose.: 184 mg/dL (12-19-17 @ 21:12)  POCT Blood Glucose.: 177 mg/dL (12-19-17 @ 19:58)  POCT Blood Glucose.: 81 mg/dL (12-19-17 @ 19:19)  POCT Blood Glucose.: 70 mg/dL (12-19-17 @ 18:55)  POCT Blood Glucose.: 72 mg/dL (12-19-17 @ 18:30)  POCT Blood Glucose.: 105 mg/dL (12-19-17 @ 14:40)  POCT Blood Glucose.: 153 mg/dL (12-19-17 @ 13:45)  POCT Blood Glucose.: 209 mg/dL (12-19-17 @ 12:13)  POCT Blood Glucose.: 270 mg/dL (12-19-17 @ 11:17)  POCT Blood Glucose.: 228 mg/dL (12-19-17 @ 10:04)  POCT Blood Glucose.: 146 mg/dL (12-19-17 @ 09:01)  POCT Blood Glucose.: 138 mg/dL (12-19-17 @ 08:05)  POCT Blood Glucose.: 144 mg/dL (12-19-17 @ 07:03)  POCT Blood Glucose.: 144 mg/dL (12-19-17 @ 06:06)  POCT Blood Glucose.: 196 mg/dL (12-19-17 @ 05:01)  POCT Blood Glucose.: 243 mg/dL (12-19-17 @ 04:01)  POCT Blood Glucose.: 230 mg/dL (12-19-17 @ 03:05)  POCT Blood Glucose.: 275 mg/dL (12-19-17 @ 02:23)  POCT Blood Glucose.: 287 mg/dL (12-19-17 @ 01:27)  POCT Blood Glucose.: 354 mg/dL (12-18-17 @ 21:15)  POCT Blood Glucose.: 387 mg/dL (12-18-17 @ 20:48)                            8.5    4.32  )-----------( 255      ( 21 Dec 2017 09:10 )             26.7       12-21    136  |  99  |  33<H>  ----------------------------<  196<H>  4.7   |  19<L>  |  4.97<H>    Ca    8.0<L>      21 Dec 2017 09:04  Phos  3.4     12-21  Mg     2.1     12-21    TPro  6.0  /  Alb  3.2<L>  /  TBili  0.4  /  DBili  x   /  AST  14  /  ALT  5<L>  /  AlkPhos  161<H>  12-21      Thyroid Function Tests:  12-19 @ 06:12 TSH 1.07 FreeT4 -- T3 -- Anti TPO -- Anti Thyroglobulin Ab -- TSI --      Hemoglobin A1C, Whole Blood: 8.5 % <H> [4.0 - 5.6] (11-25-17 @ 04:12)            Contact number: isabel [phone] or [phone]

## 2018-01-12 ENCOUNTER — APPOINTMENT (OUTPATIENT)
Dept: CT IMAGING | Facility: IMAGING CENTER | Age: 63
End: 2018-01-12
Payer: MEDICARE

## 2018-01-12 ENCOUNTER — OUTPATIENT (OUTPATIENT)
Dept: OUTPATIENT SERVICES | Facility: HOSPITAL | Age: 63
LOS: 1 days | End: 2018-01-12

## 2018-01-12 DIAGNOSIS — Z98.89 OTHER SPECIFIED POSTPROCEDURAL STATES: Chronic | ICD-10-CM

## 2018-01-12 DIAGNOSIS — Z00.8 ENCOUNTER FOR OTHER GENERAL EXAMINATION: ICD-10-CM

## 2018-01-12 PROCEDURE — 71250 CT THORAX DX C-: CPT | Mod: 26

## 2018-01-13 ENCOUNTER — INPATIENT (INPATIENT)
Facility: HOSPITAL | Age: 63
LOS: 3 days | Discharge: HOME | DRG: 637 | End: 2018-01-17
Attending: INTERNAL MEDICINE | Admitting: STUDENT IN AN ORGANIZED HEALTH CARE EDUCATION/TRAINING PROGRAM
Payer: MEDICARE

## 2018-01-13 VITALS — DIASTOLIC BLOOD PRESSURE: 84 MMHG | SYSTOLIC BLOOD PRESSURE: 128 MMHG | HEART RATE: 78 BPM | RESPIRATION RATE: 20 BRPM

## 2018-01-13 DIAGNOSIS — Z98.89 OTHER SPECIFIED POSTPROCEDURAL STATES: Chronic | ICD-10-CM

## 2018-01-13 DIAGNOSIS — E10.65 TYPE 1 DIABETES MELLITUS WITH HYPERGLYCEMIA: ICD-10-CM

## 2018-01-13 DIAGNOSIS — I10 ESSENTIAL (PRIMARY) HYPERTENSION: ICD-10-CM

## 2018-01-13 DIAGNOSIS — E10.10 TYPE 1 DIABETES MELLITUS WITH KETOACIDOSIS WITHOUT COMA: ICD-10-CM

## 2018-01-13 DIAGNOSIS — E13.10 OTHER SPECIFIED DIABETES MELLITUS WITH KETOACIDOSIS WITHOUT COMA: ICD-10-CM

## 2018-01-13 DIAGNOSIS — E78.5 HYPERLIPIDEMIA, UNSPECIFIED: ICD-10-CM

## 2018-01-13 DIAGNOSIS — N18.6 END STAGE RENAL DISEASE: ICD-10-CM

## 2018-01-13 DIAGNOSIS — E87.5 HYPERKALEMIA: ICD-10-CM

## 2018-01-13 LAB
ALBUMIN SERPL ELPH-MCNC: 3.7 G/DL — SIGNIFICANT CHANGE UP (ref 3.3–5)
ALP SERPL-CCNC: 168 U/L — HIGH (ref 40–120)
ALT FLD-CCNC: 44 U/L RC — SIGNIFICANT CHANGE UP (ref 10–45)
ANION GAP SERPL CALC-SCNC: 14 MMOL/L — SIGNIFICANT CHANGE UP (ref 5–17)
ANION GAP SERPL CALC-SCNC: 17 MMOL/L — SIGNIFICANT CHANGE UP (ref 5–17)
ANION GAP SERPL CALC-SCNC: 20 MMOL/L — HIGH (ref 5–17)
ANION GAP SERPL CALC-SCNC: 22 MMOL/L — HIGH (ref 5–17)
ANION GAP SERPL CALC-SCNC: 24 MMOL/L — HIGH (ref 5–17)
ANION GAP SERPL CALC-SCNC: 29 MMOL/L — HIGH (ref 5–17)
APTT BLD: 27.4 SEC — LOW (ref 27.5–37.4)
AST SERPL-CCNC: 122 U/L — HIGH (ref 10–40)
BASE EXCESS BLDV CALC-SCNC: -2.5 MMOL/L — LOW (ref -2–2)
BILIRUB SERPL-MCNC: 0.3 MG/DL — SIGNIFICANT CHANGE UP (ref 0.2–1.2)
BUN SERPL-MCNC: 19 MG/DL — SIGNIFICANT CHANGE UP (ref 7–23)
BUN SERPL-MCNC: 57 MG/DL — HIGH (ref 7–23)
BUN SERPL-MCNC: 57 MG/DL — HIGH (ref 7–23)
BUN SERPL-MCNC: 58 MG/DL — HIGH (ref 7–23)
BUN SERPL-MCNC: 58 MG/DL — HIGH (ref 7–23)
BUN SERPL-MCNC: 59 MG/DL — HIGH (ref 7–23)
CA-I SERPL-SCNC: 1.06 MMOL/L — LOW (ref 1.12–1.3)
CALCIUM SERPL-MCNC: 8.1 MG/DL — LOW (ref 8.4–10.5)
CALCIUM SERPL-MCNC: 8.3 MG/DL — LOW (ref 8.4–10.5)
CALCIUM SERPL-MCNC: 8.3 MG/DL — LOW (ref 8.4–10.5)
CALCIUM SERPL-MCNC: 8.4 MG/DL — SIGNIFICANT CHANGE UP (ref 8.4–10.5)
CALCIUM SERPL-MCNC: 8.5 MG/DL — SIGNIFICANT CHANGE UP (ref 8.4–10.5)
CALCIUM SERPL-MCNC: 8.8 MG/DL — SIGNIFICANT CHANGE UP (ref 8.4–10.5)
CHLORIDE BLDV-SCNC: 87 MMOL/L — LOW (ref 96–108)
CHLORIDE SERPL-SCNC: 82 MMOL/L — LOW (ref 96–108)
CHLORIDE SERPL-SCNC: 83 MMOL/L — LOW (ref 96–108)
CHLORIDE SERPL-SCNC: 84 MMOL/L — LOW (ref 96–108)
CHLORIDE SERPL-SCNC: 85 MMOL/L — LOW (ref 96–108)
CHLORIDE SERPL-SCNC: 87 MMOL/L — LOW (ref 96–108)
CHLORIDE SERPL-SCNC: 96 MMOL/L — SIGNIFICANT CHANGE UP (ref 96–108)
CK MB BLD-MCNC: 8.3 % — HIGH (ref 0–3.5)
CK MB CFR SERPL CALC: 36 NG/ML — HIGH (ref 0–3.8)
CK SERPL-CCNC: 433 U/L — HIGH (ref 25–170)
CO2 BLDV-SCNC: 25 MMOL/L — SIGNIFICANT CHANGE UP (ref 22–30)
CO2 SERPL-SCNC: 18 MMOL/L — LOW (ref 22–31)
CO2 SERPL-SCNC: 21 MMOL/L — LOW (ref 22–31)
CO2 SERPL-SCNC: 22 MMOL/L — SIGNIFICANT CHANGE UP (ref 22–31)
CO2 SERPL-SCNC: 25 MMOL/L — SIGNIFICANT CHANGE UP (ref 22–31)
CO2 SERPL-SCNC: 28 MMOL/L — SIGNIFICANT CHANGE UP (ref 22–31)
CO2 SERPL-SCNC: 29 MMOL/L — SIGNIFICANT CHANGE UP (ref 22–31)
CREAT SERPL-MCNC: 2.67 MG/DL — HIGH (ref 0.5–1.3)
CREAT SERPL-MCNC: 6.56 MG/DL — HIGH (ref 0.5–1.3)
CREAT SERPL-MCNC: 6.76 MG/DL — HIGH (ref 0.5–1.3)
CREAT SERPL-MCNC: 6.77 MG/DL — HIGH (ref 0.5–1.3)
CREAT SERPL-MCNC: 6.8 MG/DL — HIGH (ref 0.5–1.3)
CREAT SERPL-MCNC: 7.07 MG/DL — HIGH (ref 0.5–1.3)
GAS PNL BLDA: SIGNIFICANT CHANGE UP
GAS PNL BLDV: 128 MMOL/L — LOW (ref 136–145)
GAS PNL BLDV: SIGNIFICANT CHANGE UP
GLUCOSE BLDC GLUCOMTR-MCNC: 129 MG/DL — HIGH (ref 70–99)
GLUCOSE BLDC GLUCOMTR-MCNC: 131 MG/DL — HIGH (ref 70–99)
GLUCOSE BLDC GLUCOMTR-MCNC: 136 MG/DL — HIGH (ref 70–99)
GLUCOSE BLDC GLUCOMTR-MCNC: 146 MG/DL — HIGH (ref 70–99)
GLUCOSE BLDC GLUCOMTR-MCNC: 162 MG/DL — HIGH (ref 70–99)
GLUCOSE BLDC GLUCOMTR-MCNC: 230 MG/DL — HIGH (ref 70–99)
GLUCOSE BLDC GLUCOMTR-MCNC: 374 MG/DL — HIGH (ref 70–99)
GLUCOSE BLDC GLUCOMTR-MCNC: 416 MG/DL — HIGH (ref 70–99)
GLUCOSE BLDC GLUCOMTR-MCNC: 488 MG/DL — CRITICAL HIGH (ref 70–99)
GLUCOSE BLDC GLUCOMTR-MCNC: 527 MG/DL — CRITICAL HIGH (ref 70–99)
GLUCOSE BLDC GLUCOMTR-MCNC: 583 MG/DL — CRITICAL HIGH (ref 70–99)
GLUCOSE BLDC GLUCOMTR-MCNC: 586 MG/DL — CRITICAL HIGH (ref 70–99)
GLUCOSE BLDC GLUCOMTR-MCNC: >600 MG/DL — CRITICAL HIGH (ref 70–99)
GLUCOSE BLDV-MCNC: 793 MG/DL — CRITICAL HIGH (ref 70–99)
GLUCOSE SERPL-MCNC: 139 MG/DL — HIGH (ref 70–99)
GLUCOSE SERPL-MCNC: 405 MG/DL — HIGH (ref 70–99)
GLUCOSE SERPL-MCNC: 712 MG/DL — CRITICAL HIGH (ref 70–99)
GLUCOSE SERPL-MCNC: 782 MG/DL — CRITICAL HIGH (ref 70–99)
GLUCOSE SERPL-MCNC: 800 MG/DL — CRITICAL HIGH (ref 70–99)
GLUCOSE SERPL-MCNC: 806 MG/DL — CRITICAL HIGH (ref 70–99)
HCO3 BLDV-SCNC: 24 MMOL/L — SIGNIFICANT CHANGE UP (ref 21–29)
HCT VFR BLD CALC: 21.8 % — LOW (ref 34.5–45)
HCT VFR BLDA CALC: 22 % — CRITICAL LOW (ref 39–50)
HGB BLD CALC-MCNC: 7.1 G/DL — LOW (ref 11.5–15.5)
HGB BLD-MCNC: 7.4 G/DL — LOW (ref 11.5–15.5)
INR BLD: 1.13 RATIO — SIGNIFICANT CHANGE UP (ref 0.88–1.16)
LACTATE BLDV-MCNC: 1.9 MMOL/L — SIGNIFICANT CHANGE UP (ref 0.7–2)
MAGNESIUM SERPL-MCNC: 2 MG/DL — SIGNIFICANT CHANGE UP (ref 1.6–2.6)
MAGNESIUM SERPL-MCNC: 2.5 MG/DL — SIGNIFICANT CHANGE UP (ref 1.6–2.6)
MAGNESIUM SERPL-MCNC: 2.5 MG/DL — SIGNIFICANT CHANGE UP (ref 1.6–2.6)
MCHC RBC-ENTMCNC: 34 GM/DL — SIGNIFICANT CHANGE UP (ref 32–36)
MCHC RBC-ENTMCNC: 34.4 PG — HIGH (ref 27–34)
MCV RBC AUTO: 101 FL — HIGH (ref 80–100)
OTHER CELLS CSF MANUAL: 6 ML/DL — LOW (ref 18–22)
PCO2 BLDV: 54 MMHG — HIGH (ref 35–50)
PH BLDV: 7.27 — LOW (ref 7.35–7.45)
PHOSPHATE SERPL-MCNC: 2.2 MG/DL — LOW (ref 2.5–4.5)
PHOSPHATE SERPL-MCNC: 5.7 MG/DL — HIGH (ref 2.5–4.5)
PHOSPHATE SERPL-MCNC: 5.9 MG/DL — HIGH (ref 2.5–4.5)
PLATELET # BLD AUTO: 284 K/UL — SIGNIFICANT CHANGE UP (ref 150–400)
PO2 BLDV: 41 MMHG — SIGNIFICANT CHANGE UP (ref 25–45)
POTASSIUM BLDV-SCNC: 4.7 MMOL/L — SIGNIFICANT CHANGE UP (ref 3.5–5)
POTASSIUM SERPL-MCNC: 3.1 MMOL/L — LOW (ref 3.5–5.3)
POTASSIUM SERPL-MCNC: 4.5 MMOL/L — SIGNIFICANT CHANGE UP (ref 3.5–5.3)
POTASSIUM SERPL-MCNC: 4.9 MMOL/L — SIGNIFICANT CHANGE UP (ref 3.5–5.3)
POTASSIUM SERPL-MCNC: 5.3 MMOL/L — SIGNIFICANT CHANGE UP (ref 3.5–5.3)
POTASSIUM SERPL-MCNC: 5.5 MMOL/L — HIGH (ref 3.5–5.3)
POTASSIUM SERPL-MCNC: 6 MMOL/L — HIGH (ref 3.5–5.3)
POTASSIUM SERPL-SCNC: 3.1 MMOL/L — LOW (ref 3.5–5.3)
POTASSIUM SERPL-SCNC: 4.5 MMOL/L — SIGNIFICANT CHANGE UP (ref 3.5–5.3)
POTASSIUM SERPL-SCNC: 4.9 MMOL/L — SIGNIFICANT CHANGE UP (ref 3.5–5.3)
POTASSIUM SERPL-SCNC: 5.3 MMOL/L — SIGNIFICANT CHANGE UP (ref 3.5–5.3)
POTASSIUM SERPL-SCNC: 5.5 MMOL/L — HIGH (ref 3.5–5.3)
POTASSIUM SERPL-SCNC: 6 MMOL/L — HIGH (ref 3.5–5.3)
PROT SERPL-MCNC: 6.6 G/DL — SIGNIFICANT CHANGE UP (ref 6–8.3)
PROTHROM AB SERPL-ACNC: 12.3 SEC — SIGNIFICANT CHANGE UP (ref 9.8–12.7)
RAPID RVP RESULT: SIGNIFICANT CHANGE UP
RBC # BLD: 2.15 M/UL — LOW (ref 3.8–5.2)
RBC # FLD: 12.5 % — SIGNIFICANT CHANGE UP (ref 10.3–14.5)
SAO2 % BLDV: 58 % — LOW (ref 67–88)
SODIUM SERPL-SCNC: 128 MMOL/L — LOW (ref 135–145)
SODIUM SERPL-SCNC: 128 MMOL/L — LOW (ref 135–145)
SODIUM SERPL-SCNC: 129 MMOL/L — LOW (ref 135–145)
SODIUM SERPL-SCNC: 130 MMOL/L — LOW (ref 135–145)
SODIUM SERPL-SCNC: 132 MMOL/L — LOW (ref 135–145)
SODIUM SERPL-SCNC: 139 MMOL/L — SIGNIFICANT CHANGE UP (ref 135–145)
TROPONIN T SERPL-MCNC: 2.57 NG/ML — HIGH (ref 0–0.06)
WBC # BLD: 11.6 K/UL — HIGH (ref 3.8–10.5)
WBC # FLD AUTO: 11.6 K/UL — HIGH (ref 3.8–10.5)

## 2018-01-13 PROCEDURE — 36556 INSERT NON-TUNNEL CV CATH: CPT

## 2018-01-13 PROCEDURE — 74177 CT ABD & PELVIS W/CONTRAST: CPT | Mod: 26

## 2018-01-13 PROCEDURE — 99292 CRITICAL CARE ADDL 30 MIN: CPT | Mod: 25

## 2018-01-13 PROCEDURE — 99285 EMERGENCY DEPT VISIT HI MDM: CPT | Mod: 25,GC

## 2018-01-13 PROCEDURE — 71045 X-RAY EXAM CHEST 1 VIEW: CPT | Mod: 26

## 2018-01-13 PROCEDURE — 93010 ELECTROCARDIOGRAM REPORT: CPT

## 2018-01-13 PROCEDURE — 99223 1ST HOSP IP/OBS HIGH 75: CPT

## 2018-01-13 PROCEDURE — 99291 CRITICAL CARE FIRST HOUR: CPT

## 2018-01-13 RX ORDER — LISINOPRIL 2.5 MG/1
40 TABLET ORAL DAILY
Qty: 0 | Refills: 0 | Status: DISCONTINUED | OUTPATIENT
Start: 2018-01-13 | End: 2018-01-17

## 2018-01-13 RX ORDER — SODIUM CHLORIDE 9 MG/ML
3000 INJECTION INTRAMUSCULAR; INTRAVENOUS; SUBCUTANEOUS ONCE
Qty: 0 | Refills: 0 | Status: COMPLETED | OUTPATIENT
Start: 2018-01-13 | End: 2018-01-13

## 2018-01-13 RX ORDER — ONDANSETRON 8 MG/1
4 TABLET, FILM COATED ORAL ONCE
Qty: 0 | Refills: 0 | Status: COMPLETED | OUTPATIENT
Start: 2018-01-13 | End: 2018-01-13

## 2018-01-13 RX ORDER — METOPROLOL TARTRATE 50 MG
25 TABLET ORAL
Qty: 0 | Refills: 0 | Status: DISCONTINUED | OUTPATIENT
Start: 2018-01-13 | End: 2018-01-17

## 2018-01-13 RX ORDER — HYDRALAZINE HCL 50 MG
50 TABLET ORAL EVERY 8 HOURS
Qty: 0 | Refills: 0 | Status: DISCONTINUED | OUTPATIENT
Start: 2018-01-13 | End: 2018-01-17

## 2018-01-13 RX ORDER — ALBUTEROL 90 UG/1
2.5 AEROSOL, METERED ORAL ONCE
Qty: 0 | Refills: 0 | Status: DISCONTINUED | OUTPATIENT
Start: 2018-01-13 | End: 2018-01-13

## 2018-01-13 RX ORDER — AZITHROMYCIN 500 MG/1
500 TABLET, FILM COATED ORAL ONCE
Qty: 0 | Refills: 0 | Status: COMPLETED | OUTPATIENT
Start: 2018-01-13 | End: 2018-01-13

## 2018-01-13 RX ORDER — HEPARIN SODIUM 5000 [USP'U]/ML
5000 INJECTION INTRAVENOUS; SUBCUTANEOUS EVERY 8 HOURS
Qty: 0 | Refills: 0 | Status: DISCONTINUED | OUTPATIENT
Start: 2018-01-13 | End: 2018-01-17

## 2018-01-13 RX ORDER — CINACALCET 30 MG/1
60 TABLET, FILM COATED ORAL DAILY
Qty: 0 | Refills: 0 | Status: DISCONTINUED | OUTPATIENT
Start: 2018-01-13 | End: 2018-01-17

## 2018-01-13 RX ORDER — CEFTRIAXONE 500 MG/1
1 INJECTION, POWDER, FOR SOLUTION INTRAMUSCULAR; INTRAVENOUS ONCE
Qty: 0 | Refills: 0 | Status: COMPLETED | OUTPATIENT
Start: 2018-01-13 | End: 2018-01-13

## 2018-01-13 RX ORDER — INSULIN HUMAN 100 [IU]/ML
1 INJECTION, SOLUTION SUBCUTANEOUS
Qty: 100 | Refills: 0 | Status: DISCONTINUED | OUTPATIENT
Start: 2018-01-13 | End: 2018-01-13

## 2018-01-13 RX ORDER — FENTANYL CITRATE 50 UG/ML
12.5 INJECTION INTRAVENOUS ONCE
Qty: 0 | Refills: 0 | Status: DISCONTINUED | OUTPATIENT
Start: 2018-01-13 | End: 2018-01-13

## 2018-01-13 RX ORDER — HYDRALAZINE HCL 50 MG
25 TABLET ORAL EVERY 8 HOURS
Qty: 0 | Refills: 0 | Status: DISCONTINUED | OUTPATIENT
Start: 2018-01-13 | End: 2018-01-13

## 2018-01-13 RX ORDER — SODIUM CHLORIDE 9 MG/ML
1000 INJECTION, SOLUTION INTRAVENOUS
Qty: 0 | Refills: 0 | Status: DISCONTINUED | OUTPATIENT
Start: 2018-01-13 | End: 2018-01-14

## 2018-01-13 RX ORDER — DULOXETINE HYDROCHLORIDE 30 MG/1
60 CAPSULE, DELAYED RELEASE ORAL DAILY
Qty: 0 | Refills: 0 | Status: DISCONTINUED | OUTPATIENT
Start: 2018-01-13 | End: 2018-01-15

## 2018-01-13 RX ORDER — ATORVASTATIN CALCIUM 80 MG/1
20 TABLET, FILM COATED ORAL AT BEDTIME
Qty: 0 | Refills: 0 | Status: DISCONTINUED | OUTPATIENT
Start: 2018-01-13 | End: 2018-01-17

## 2018-01-13 RX ORDER — OXYCODONE AND ACETAMINOPHEN 5; 325 MG/1; MG/1
1 TABLET ORAL EVERY 6 HOURS
Qty: 0 | Refills: 0 | Status: DISCONTINUED | OUTPATIENT
Start: 2018-01-13 | End: 2018-01-17

## 2018-01-13 RX ORDER — INSULIN HUMAN 100 [IU]/ML
6 INJECTION, SOLUTION SUBCUTANEOUS ONCE
Qty: 0 | Refills: 0 | Status: COMPLETED | OUTPATIENT
Start: 2018-01-13 | End: 2018-01-13

## 2018-01-13 RX ORDER — ASPIRIN/CALCIUM CARB/MAGNESIUM 324 MG
81 TABLET ORAL DAILY
Qty: 0 | Refills: 0 | Status: DISCONTINUED | OUTPATIENT
Start: 2018-01-13 | End: 2018-01-17

## 2018-01-13 RX ORDER — CLOPIDOGREL BISULFATE 75 MG/1
75 TABLET, FILM COATED ORAL AT BEDTIME
Qty: 0 | Refills: 0 | Status: DISCONTINUED | OUTPATIENT
Start: 2018-01-13 | End: 2018-01-17

## 2018-01-13 RX ORDER — CEFTRIAXONE 500 MG/1
INJECTION, POWDER, FOR SOLUTION INTRAMUSCULAR; INTRAVENOUS
Qty: 0 | Refills: 0 | Status: DISCONTINUED | OUTPATIENT
Start: 2018-01-13 | End: 2018-01-13

## 2018-01-13 RX ORDER — CALCIUM GLUCONATE 100 MG/ML
2 VIAL (ML) INTRAVENOUS ONCE
Qty: 0 | Refills: 0 | Status: COMPLETED | OUTPATIENT
Start: 2018-01-13 | End: 2018-01-13

## 2018-01-13 RX ORDER — SODIUM BICARBONATE 1 MEQ/ML
50 SYRINGE (ML) INTRAVENOUS ONCE
Qty: 0 | Refills: 0 | Status: COMPLETED | OUTPATIENT
Start: 2018-01-13 | End: 2018-01-13

## 2018-01-13 RX ORDER — ALBUTEROL 90 UG/1
2.5 AEROSOL, METERED ORAL ONCE
Qty: 0 | Refills: 0 | Status: COMPLETED | OUTPATIENT
Start: 2018-01-13 | End: 2018-01-13

## 2018-01-13 RX ORDER — INSULIN HUMAN 100 [IU]/ML
1 INJECTION, SOLUTION SUBCUTANEOUS
Qty: 100 | Refills: 0 | Status: DISCONTINUED | OUTPATIENT
Start: 2018-01-13 | End: 2018-01-14

## 2018-01-13 RX ORDER — AZITHROMYCIN 500 MG/1
TABLET, FILM COATED ORAL
Qty: 0 | Refills: 0 | Status: DISCONTINUED | OUTPATIENT
Start: 2018-01-13 | End: 2018-01-13

## 2018-01-13 RX ADMIN — CEFTRIAXONE 100 GRAM(S): 500 INJECTION, POWDER, FOR SOLUTION INTRAMUSCULAR; INTRAVENOUS at 06:43

## 2018-01-13 RX ADMIN — ONDANSETRON 4 MILLIGRAM(S): 8 TABLET, FILM COATED ORAL at 01:23

## 2018-01-13 RX ADMIN — LISINOPRIL 40 MILLIGRAM(S): 2.5 TABLET ORAL at 08:32

## 2018-01-13 RX ADMIN — DULOXETINE HYDROCHLORIDE 60 MILLIGRAM(S): 30 CAPSULE, DELAYED RELEASE ORAL at 13:17

## 2018-01-13 RX ADMIN — Medication 25 MILLIGRAM(S): at 06:35

## 2018-01-13 RX ADMIN — FENTANYL CITRATE 12.5 MICROGRAM(S): 50 INJECTION INTRAVENOUS at 08:00

## 2018-01-13 RX ADMIN — INSULIN HUMAN 6 UNIT(S)/HR: 100 INJECTION, SOLUTION SUBCUTANEOUS at 02:45

## 2018-01-13 RX ADMIN — OXYCODONE AND ACETAMINOPHEN 1 TABLET(S): 5; 325 TABLET ORAL at 07:00

## 2018-01-13 RX ADMIN — Medication 50 MILLIEQUIVALENT(S): at 02:54

## 2018-01-13 RX ADMIN — SODIUM CHLORIDE 50 MILLILITER(S): 9 INJECTION, SOLUTION INTRAVENOUS at 22:09

## 2018-01-13 RX ADMIN — Medication 200 GRAM(S): at 02:34

## 2018-01-13 RX ADMIN — Medication 25 MILLIGRAM(S): at 18:51

## 2018-01-13 RX ADMIN — ALBUTEROL 2.5 MILLIGRAM(S): 90 AEROSOL, METERED ORAL at 02:45

## 2018-01-13 RX ADMIN — CLOPIDOGREL BISULFATE 75 MILLIGRAM(S): 75 TABLET, FILM COATED ORAL at 22:09

## 2018-01-13 RX ADMIN — OXYCODONE AND ACETAMINOPHEN 1 TABLET(S): 5; 325 TABLET ORAL at 06:22

## 2018-01-13 RX ADMIN — FENTANYL CITRATE 12.5 MICROGRAM(S): 50 INJECTION INTRAVENOUS at 08:15

## 2018-01-13 RX ADMIN — Medication 81 MILLIGRAM(S): at 13:19

## 2018-01-13 RX ADMIN — FENTANYL CITRATE 12.5 MICROGRAM(S): 50 INJECTION INTRAVENOUS at 07:45

## 2018-01-13 RX ADMIN — ALBUTEROL 2.5 MILLIGRAM(S): 90 AEROSOL, METERED ORAL at 02:56

## 2018-01-13 RX ADMIN — Medication 25 MILLIGRAM(S): at 13:17

## 2018-01-13 RX ADMIN — INSULIN HUMAN 1 UNIT(S)/HR: 100 INJECTION, SOLUTION SUBCUTANEOUS at 08:31

## 2018-01-13 RX ADMIN — INSULIN HUMAN 6 UNIT(S): 100 INJECTION, SOLUTION SUBCUTANEOUS at 01:38

## 2018-01-13 RX ADMIN — Medication 50 MILLIGRAM(S): at 22:09

## 2018-01-13 RX ADMIN — ATORVASTATIN CALCIUM 20 MILLIGRAM(S): 80 TABLET, FILM COATED ORAL at 22:09

## 2018-01-13 RX ADMIN — AZITHROMYCIN 250 MILLIGRAM(S): 500 TABLET, FILM COATED ORAL at 08:30

## 2018-01-13 RX ADMIN — CINACALCET 60 MILLIGRAM(S): 30 TABLET, FILM COATED ORAL at 13:17

## 2018-01-13 NOTE — H&P ADULT - ATTENDING COMMENTS
critical care time 40 mins  Patient seen and examined.  Agree with resident note as above.  Patient is well known to the MICU and presents with history as above.  Found to be in DKA and hyperkalemic, requiring HD.  Triage to MICU for insulin gtt with DKA protocol and urgent HD for hyperkalemia.  Will treat for CAP for now given leukocytosis and L basilar infiltrate on CT.  Remainder of plan as above.  Renal and endo to see patient.

## 2018-01-13 NOTE — CONSULT NOTE ADULT - PROBLEM SELECTOR RECOMMENDATION 9
HD arranged for today  Consent obtained and placed in chart, orders in [facility]  HD unit notified  No UF given possible underlying infection and DKA (d/w MICU team)  dose meds for eGFR<15

## 2018-01-13 NOTE — CONSULT NOTE ADULT - ASSESSMENT
61 y/o female with above stated history including ESRD on HD, DKA on insulin pump, presents with hyperkalemia, DKA in setting of recent flu-like symptoms.  CT A/P prelim report/findings noted, possible infectious opacities in lungs.

## 2018-01-13 NOTE — H&P ADULT - NSHPPHYSICALEXAM_GEN_ALL_CORE
PHYSICAL EXAM:    General: No acute distress.  Heart: RRR.  Normal S1 and S2.  No murmurs, rubs, or gallops.   Lungs: CTAB. No wheezes, crackles, or rhonchi.    Abdomen: BS+, soft, non distended, tender in RUQ no rebound/guarding  Skin: Warm and dry.  No rashes.  Extremities: Chronic 2+ Pitting edema in LLE, RLE trace edema.  2+ peripheral pulses b/l.  Musculoskeletal: No deformities.  No spinal or paraspinal tenderness.  Neuro: A&Ox3. CN II-XII intact.  5/5 strength in UE and LE b/l.  Tactile sensation intact in UE and LE b/l.  Cerebellar function intact as assessed by finger-to-nose test.

## 2018-01-13 NOTE — H&P ADULT - NSHPLABSRESULTS_GEN_ALL_CORE
8.5    12.1  )-----------( 374      ( 13 Jan 2018 01:15 )             28.8       01-13    128<L>  |  74<L>  |  56<H>  ----------------------------<  1049<HH>  6.9<HH>   |  11<L>  |  6.75<H>    Ca    8.8      13 Jan 2018 01:15    TPro  7.5  /  Alb  4.1  /  TBili  0.5  /  DBili  x   /  AST  66<H>  /  ALT  28  /  AlkPhos  180<H>  01-13                  PT/INR - ( 13 Jan 2018 01:15 )   PT: 11.9 sec;   INR: 1.09 ratio         PTT - ( 13 Jan 2018 01:15 )  PTT:29.1 sec    Lactate Trend      CARDIAC MARKERS ( 13 Jan 2018 01:15 )  x     / 0.19 ng/mL / x     / x     / x            CAPILLARY BLOOD GLUCOSE      POCT Blood Glucose.: >600 mg/dL (13 Jan 2018 03:28)

## 2018-01-13 NOTE — ED ADULT NURSE NOTE - OBJECTIVE STATEMENT
62 y.o female bibems from home activated by spouse p/w c/o elevated FSBS at home, on insulin pump, h/o DKA, DM1, CAD with stents x6, Heart failure, ESRD on HD 4x a week, last dialyzed Thursday, also c/o nausea and vomiting, FSBS >600mg/dl, IV access inserted, due labs sent pending results. 12 lead ekg obtained, due emds given as ordered.

## 2018-01-13 NOTE — CONSULT NOTE ADULT - SUBJECTIVE AND OBJECTIVE BOX
HPI:  61 y/o F w/ hx of DM Type 1 x 43 years. Complicated by nephropathy with ESRD on HD, neuropathy, retinopathy, macrovascular complications of CAD. A1c 8.5%. On medtronic insulin pump for the past 3 years with settings as listed below. Changes sites every 3 days. Pump now off. Does not have supplies with her, but her  will be bringing in supplies shortly. Uses bolus wizard. Checks glucose 5 x/day with Junior Contour glucometer. Values mostly 150's-190's. No recent hypoglycemia. Reported some nausea, vomiting, and shortness of breath. Complains of LE pain with swelling which has been chronic. Denies polyuria, polydipsia, heat intolerance. Tries to monitor carbs. +recent strep infection causing hyperglycemia and possible DKA    Pump Settings:  Basal:  12am 0.275  5am 0.375    I:C 15    Sensitivity:  12AM 60  7AM 40  6PM 60    Target   12am 110-130  8am 100-120    Also with hx of HTN, HLD, former smoker, MI, CAD s/p PCI to RCA and brachytherapy in Aug 2017, dCHF (last TTE in Nov 2017 mild-mod MR, mild AS, mild-mod TR, EF 40-45%), chronic LLE pain and edema in setting of severe PVD s/p L & R fem-pop bypass graft with fem-pop bypass revisions, left subclavian vein stenosis s/p stent, ESRD on HD (Mon/Tu/Thr/Sat) via L AVF with oliguria, CVA with memory deficit, and depression who presents to the ED w/ a cc of n/v and diffuse abdominal pain of one days duration. Pt also endorsing flu-like symptoms 1 week prior to admission. Pt was found to be in DKA with VBG pH 7.2, HCO3 11, AG 43, BMP glucose 1049, and B-hydroxy butyrate >8. Pt also found to have serum K of 6.9, not hemolyzed, with peaked T waves in V2-V4. Pt was bolused 3L NS and given 2g calcium gluconate, albuterol x2, insulin 6 units x1, sodium bicarb 50meq x1, and subsequently started on an insulin gtt @1u/h. Pt was admitted to MICU for management of DKA.       PAST MEDICAL & SURGICAL HISTORY:  Subclavian vein stenosis, left: s/p stent  Cellulitis: 2015 left foot  DKA, type 1: 1/2015  ACS (acute coronary syndrome): 1/2015 - cath revealed 100% ostial stenosis not amenable to PCI - medical management  MI, old  TIA (transient ischemic attack): x 2 - 8-9 years ago prior to ASD/VSD repair  CAD (coronary artery disease): s/p stents  Murmur, cardiac  Gout: past  CVA (cerebral infarction): with no residual, 8 yrs ago, prior to heart surgery - ST memory loss  Peripheral vascular disease: occluded left fem-pop bypass 5/2015  Diabetes mellitus type 1: Insulin Dependent - Medtronic  Minimed Paradigm Insulin Pump - Novolog  ESRD (end stage renal disease): dialysis , tue, thursday, saturday  Hyperlipidemia  Status post device closure of ASD: &quot;brenna&quot;  History of cardiac catheterization: 1/2015 - no intervention  S/P femoral-popliteal bypass surgery: L and R in 2013 with graft; 5/2015 CFA angioplasty left and ileofemoral endarterectomywith vein patch angioplasty of left fem-pop bypass graft  Multiple vascular surgery both leg, left fempop bypass revision 11/2015  AV (arteriovenous fistula): Left AV graft; revision with stent placement 2-3 years ago  S/P cholecystectomy      FAMILY HISTORY:  Family history of smoking  Family history of hypertension  Family history of cancer (Sibling)      Social History: + former tobacco use [id number] yrs, quit 17 years ago  Lives with  in [place]    Outpatient Medications:  · 	cinacalcet 60 mg oral tablet: 1 tab(s) orally once a day  · 	furosemide 40 mg oral tablet: 1 tab(s) orally once a day  · 	metoprolol tartrate 25 mg oral tablet: 1 tab(s) orally 2 times a day  · 	oxyCODONE-acetaminophen 5 mg-325 mg oral tablet: 1 tab(s) orally every 6 hours, As needed, Severe Pain (7 - 10)  · 	atorvastatin 20 mg oral tablet: 1 tab(s) orally once a day (at bedtime)  · 	aspirin 81 mg oral delayed release tablet: 1 tab(s) orally once a day  · 	clopidogrel 75 mg oral tablet: 1 tab(s) orally once a day (at bedtime)  · 	DULoxetine 60 mg oral delayed release capsule: 1 cap(s) orally every other day  · 	sevelamer hydrochloride 800 mg oral tablet: 2 tab(s) orally 3 times a day (with meals)  · 	hydrALAZINE 25 mg oral tablet: 4 tab(s) orally every 8 hours  · 	HumaLOG 100 units/mL subcutaneous solution: 150 unit(s) subcutaneous daily via pump  · 	lisinopril 40 mg oral tablet: 1 tab(s) orally once a day    MEDICATIONS  (STANDING):  aspirin enteric coated 81 milliGRAM(s) Oral daily  atorvastatin 20 milliGRAM(s) Oral at bedtime  cinacalcet 60 milliGRAM(s) Oral daily  clopidogrel Tablet 75 milliGRAM(s) Oral at bedtime  DULoxetine 60 milliGRAM(s) Oral daily  heparin  Injectable 5000 Unit(s) SubCutaneous every 8 hours  hydrALAZINE 25 milliGRAM(s) Oral every 8 hours  insulin Infusion 1 Unit(s)/Hr (1 mL/Hr) IV Continuous <Continuous>  lisinopril 40 milliGRAM(s) Oral daily  metoprolol     tartrate 25 milliGRAM(s) Oral two times a day    MEDICATIONS  (PRN):  oxyCODONE    5 mG/acetaminophen 325 mG 1 Tablet(s) Oral every 6 hours PRN Moderate to Severe Pain      Allergies    No Known Allergies    Intolerances      Review of Systems:  Constitutional: No fever or chills  Eyes: +retinopathy  Neuro: No headache, +neuropathy  HEENT: No throat pain  Cardiovascular: denies chest pain  Respiratory: improved SOB  GI: + nausea and vomiting  : No polyuria  Skin: no rash  Psych: no depression  Endocrine: No polydipsia, No heat or cold intolerance, rest as noted in HPI  Hem/lymph: + chronic swelling of legs      All other review of systems negative      PHYSICAL EXAM:  VITALS: T(C): 36.3 (01-13-18 @ 12:00)  T(F): 97.4 (01-13-18 @ 12:00), Max: 98 (01-13-18 @ 03:00)  HR: 75 (01-13-18 @ 16:00) (69 - 104)  BP: 165/81 (01-13-18 @ 16:00) (114/58 - 165/81)  RR:  (13 - 28)  SpO2:  (93% - 100%)  Wt(kg): --  GENERAL: NAD at this time  EYES: No proptosis, EOMI  HEENT:  Atraumatic, Normocephalic,   THYROID: Normal size, no palpable nodules  RESPIRATORY: Clear to auscultation bilaterally, full excursion, non-labored  CARDIOVASCULAR: Regular rhythm; No murmurs; +b/l peripheral edema  GI: Soft, mild tenderness to deep palpation in epigastrium and right upper quadrant without guarding or rebound, non distended, normal bowel sounds  SKIN: Dry, intact, No rashes or lesions  MUSCULOSKELETAL: normal strength  NEURO: follows commands, no tremor,   PSYCH: Alert and oriented x 3, normal affect, normal mood  CUSHING'S SIGNS: no striae      POCT Blood Glucose.: 416 mg/dL (01-13-18 @ 16:12)  POCT Blood Glucose.: 488 mg/dL (01-13-18 @ 15:02)  POCT Blood Glucose.: 527 mg/dL (01-13-18 @ 14:06)  POCT Blood Glucose.: 586 mg/dL (01-13-18 @ 13:13)  POCT Blood Glucose.: 583 mg/dL (01-13-18 @ 13:12)  POCT Blood Glucose.: >600 mg/dL (01-13-18 @ 12:12)  POCT Blood Glucose.: >600 mg/dL (01-13-18 @ 12:11)  POCT Blood Glucose.: >600 mg/dL (01-13-18 @ 11:13)  POCT Blood Glucose.: >600 mg/dL (01-13-18 @ 11:12)  POCT Blood Glucose.: >600 mg/dL (01-13-18 @ 10:27)  POCT Blood Glucose.: >600 mg/dL (01-13-18 @ 10:26)  POCT Blood Glucose.: >600 mg/dL (01-13-18 @ 09:27)  POCT Blood Glucose.: >600 mg/dL (01-13-18 @ 09:26)  POCT Blood Glucose.: >600 mg/dL (01-13-18 @ 07:21)  POCT Blood Glucose.: >600 mg/dL (01-13-18 @ 07:10)  POCT Blood Glucose.: >600 mg/dL (01-13-18 @ 04:11)  POCT Blood Glucose.: >600 mg/dL (01-13-18 @ 03:28)  POCT Blood Glucose.: >600 mg/dL (01-13-18 @ 03:03)  POCT Blood Glucose.: >600 mg/dL (01-13-18 @ 02:13)  POCT Blood Glucose.: >600 mg/dL (01-13-18 @ 00:58)                              7.4    11.6  )-----------( 284      ( 13 Jan 2018 06:15 )             21.8       01-13    132<L>  |  87<L>  |  58<H>  ----------------------------<  405<H>  4.5   |  28  |  7.07<H>    EGFR if : 7<L>  EGFR if non : 6<L>    Ca    8.8      01-13  Mg     2.5     01-13  Phos  5.7     01-13    TPro  6.6  /  Alb  3.7  /  TBili  0.3  /  DBili  x   /  AST  122<H>  /  ALT  44  /  AlkPhos  168<H>  01-13    Thyroid Function Tests:  12-19 @ 06:12 TSH 1.07 FreeT4 -- T3 -- Anti TPO -- Anti Thyroglobulin Ab -- TSI --      Hemoglobin A1C, Whole Blood: 8.5 % <H> [4.0 - 5.6] (11-25-17 @ 04:12)        Radiology:

## 2018-01-13 NOTE — CONSULT NOTE ADULT - ASSESSMENT
63 y/o F w/ uncontrolled Type 1 DM w/ ESRD on HD neuropathy, retinopathy, macrovascular complications of CAD on insulin pump at home with hyperglycemia now admitted with DKA also with HTN and HLD (high risk patient with high level decision-making).

## 2018-01-13 NOTE — CONSULT NOTE ADULT - PROBLEM SELECTOR RECOMMENDATION 2
Continue with insulin gtt  Monitor potassium and phosphorus  trend AG and bicarb  Can transition to SQ insulin or pump once DKA improves

## 2018-01-13 NOTE — ED ADULT NURSE REASSESSMENT NOTE - NS ED NURSE REASSESS COMMENT FT1
Patient remains stable while in the ED, on CM pending lab results and MICU consult. Patient family refused Insulin pump to be removed, MD made aware.

## 2018-01-13 NOTE — CONSULT NOTE ADULT - SUBJECTIVE AND OBJECTIVE BOX
[facility]  [phone]  Dr. Urban (covering for Dr. Burger)  NEPHROLOGY  INITIAL CONSULT NOTE  --------------------------------------------------------------------------------  HPI:  63 y/o F with a PMHx significant for T1DM (on insulin pump, with multiple admissions for DKA), HTN, HLD, former smoker, MI, CAD s/p PCI to RCA and brachytherapy in Aug 2017, dCHF (last TTE in Nov 2017 mild-mod MR, mild AS, mild-mod TR, EF 40-45%), chronic LLE pain and edema in setting of severe PVD s/p L & R fem-pop bypass graft with fem-pop bypass revisions, left subclavian vein stenosis s/p stent, ESRD on HD (Mon/Tu/Thr/Sat) via L AVF with oliguria, CVA with memory deficit, and depression who presents to the ED w/ a cc of n/v and diffuse abdominal pain of one days duration. Pt also endorsing flu-like symptoms 1 week prior to admission. Pt was found to be in DKA with VBG pH 7.2 and hyperkalemia; reports last HD treatment on Thursday.  Hyperkalemia and DKA medically managed by MICU team overnight.  Patient due for HD today.      PAST HISTORY  --------------------------------------------------------------------------------  PAST MEDICAL & SURGICAL HISTORY:  Subclavian vein stenosis, left: s/p stent  Cellulitis: 2015 left foot  DKA, type 1: 1/2015  ACS (acute coronary syndrome): 1/2015 - cath revealed 100% ostial stenosis not amenable to PCI - medical management  MI, old  TIA (transient ischemic attack): x 2 - 8-9 years ago prior to ASD/VSD repair  CAD (coronary artery disease): s/p stents  Murmur, cardiac  Gout: past  CVA (cerebral infarction): with no residual, 8 yrs ago, prior to heart surgery - ST memory loss  Peripheral vascular disease: occluded left fem-pop bypass 5/2015  Diabetes mellitus type 1: Insulin Dependent - Medtronic  Minimed Paradigm Insulin Pump - Novolog  ESRD (end stage renal disease): dialysis , tue, thursday, saturday  Hyperlipidemia  Status post device closure of ASD: &quot;brenna&quot;  History of cardiac catheterization: 1/2015 - no intervention  S/P femoral-popliteal bypass surgery: L and R in 2013 with graft; 5/2015 CFA angioplasty left and ileofemoral endarterectomywith vein patch angioplasty of left fem-pop bypass graft  Multiple vascular surgery both leg, left fempop bypass revision 11/2015  AV (arteriovenous fistula): Left AV graft; revision with stent placement 2-3 years ago  S/P cholecystectomy    FAMILY HISTORY:  Family history of smoking  Family history of hypertension  Family history of cancer (Sibling)    PAST SOCIAL HISTORY:  Denies EtOH/Tobacco/Illicit drug    ALLERGIES & MEDICATIONS  --------------------------------------------------------------------------------  Allergies    No Known Allergies    Intolerances      Standing Inpatient Medications  aspirin enteric coated 81 milliGRAM(s) Oral daily  atorvastatin 20 milliGRAM(s) Oral at bedtime  cinacalcet 60 milliGRAM(s) Oral daily  clopidogrel Tablet 75 milliGRAM(s) Oral at bedtime  DULoxetine 60 milliGRAM(s) Oral daily  heparin  Injectable 5000 Unit(s) SubCutaneous every 8 hours  hydrALAZINE 25 milliGRAM(s) Oral every 8 hours  insulin Infusion 1 Unit(s)/Hr IV Continuous <Continuous>  lisinopril 40 milliGRAM(s) Oral daily  metoprolol     tartrate 25 milliGRAM(s) Oral two times a day    PRN Inpatient Medications  oxyCODONE    5 mG/acetaminophen 325 mG 1 Tablet(s) Oral every 6 hours PRN      REVIEW OF SYSTEMS  --------------------------------------------------------------------------------  General: + fatigue + rigors  CVS: denies CP/palpitations  Respiratory:  Denies SOB, + dry cough  GI: decreased appetite.  Denies N/V  : still makes some urine  Neuro:  Denies vertigo/diplopia.  h/o CVA    All other systems were reviewed and are negative, except as noted.    VITALS/PHYSICAL EXAM  --------------------------------------------------------------------------------  T(C): 36.7 (01-13-18 @ 04:00), Max: 36.7 (01-13-18 @ 03:00)  HR: 73 (01-13-18 @ 08:00) (73 - 104)  BP: 137/66 (01-13-18 @ 08:00) (116/62 - 162/75)  RR: 18 (01-13-18 @ 08:00) (15 - 28)  SpO2: 100% (01-13-18 @ 08:00) (93% - 100%)  Wt(kg): --  Height (cm): 160.02 (01-13-18 @ 04:00)  Weight (kg): 52.9 (01-13-18 @ 04:00)  BMI (kg/m2): 20.7 (01-13-18 @ 04:00)  BSA (m2): 1.54 (01-13-18 @ 04:00)      01-12-18 @ 07:01  -  01-13-18 @ 07:00  --------------------------------------------------------  IN: 59 mL / OUT: 0 mL / NET: 59 mL      Physical Exam:  	Gen: NAD, frail-appearing  	Pulm: CTA B/L ant/lat fields  	CV: RRR, S1S2  	Abd: +BS, soft, nontender/nondistended  	: No suprapubic tenderness, no irene  	Extremity: LLE edema > right (chronic per patient/chart)  	Neuro: A&Ox3  	Vascular access: LUE AVF + thrill    LABS/STUDIES  --------------------------------------------------------------------------------              7.4    11.6  >-----------<  284      [01-13-18 @ 06:15]              21.8     129  |  82  |  57  ----------------------------<  782      [01-13-18 @ 06:15]  5.3   |  18  |  6.56        Ca     8.5     [01-13-18 @ 06:15]      Mg     2.5     [01-13-18 @ 06:15]      Phos  5.7     [01-13-18 @ 06:15]    TPro  6.6  /  Alb  3.7  /  TBili  0.3  /  DBili  x   /  AST  122  /  ALT  44  /  AlkPhos  168  [01-13-18 @ 06:15]    PT/INR: PT 12.3 , INR 1.13       [01-13-18 @ 06:15]  PTT: 27.4       [01-13-18 @ 06:15]    Troponin 0.19      [01-13-18 @ 01:15]    eGFR if : 7 mL/min/1.73M2 (01-13 @ 06:15)  eGFR if African American: 7 mL/min/1.73M2 (01-13 @ 01:15)    eGFR if Non African American: 6 mL/min/1.73M2 (01-13 @ 06:15)  eGFR if Non African American: 6 mL/min/1.73M2 (01-13 @ 01:15)    Creatinine Trend:  SCr 6.56 [01-13 @ 06:15]  SCr 6.75 [01-13 @ 01:15]  SCr 4.97 [12-21 @ 09:04]  SCr 4.29 [12-20 @ 17:03]  SCr 4.14 [12-20 @ 13:17]    Urinalysis - [06-04-17 @ 08:24]      Color Yellow / Appearance Clear / SG 1.013 / pH 8.5      Gluc 1000 / Ketone Negative  / Bili Negative / Urobili Negative       Blood Negative / Protein >600 / Leuk Est Small / Nitrite Negative      RBC 3-5 / WBC 6-10 / Hyaline  / Gran  / Sq Epi  / Non Sq Epi OCC / Bacteria       HbA1c 8.5      [11-25-17 @ 04:12]  TSH 1.07      [12-19-17 @ 06:12]    HBsAb <3.0      [12-19-17 @ 06:12]  HBsAb Nonreact      [05-02-15 @ 15:35]  HBsAg Nonreact      [12-19-17 @ 06:12]  HBcAb Nonreact      [12-19-17 @ 06:12]  HCV 0.12, Nonreact      [12-19-17 @ 06:12]

## 2018-01-13 NOTE — H&P ADULT - ASSESSMENT
62F PMH T1DM (on insulin pump), HTN, HLD, MI, CAD s/p PCI to RCA and brachytherapy in Aug 2017, HFpEF (last TTE- 11/2017- mild-mod   MR, mild AS, moderate pulmHTN EF 40-45%) chronic LLE pain and edema in setting of severe PVD s/p L & R fem-pop bypass  graft, ESRD on HD (Tu/Thr/Sat) via L AVF with oliguria, CVA with memory deficit, p/w n/v and generalized weakness found to have   Glucose-862, K-6.9 w/ EKG changes, AG-43 pt started on Insulin gtt and admitted to MICU for management of DKA.       PLAN:  #Neuro  --At baseline AAOx 3, no issues. C/w atorvastatin for h/o CVA.     #Pulmonary  --No active issues. No evidence of fluid overload on physical exam    #Cardiac  --acute on chronic HFrEF, pulmonary edema improving with HD  --c/w home lisinopril, hydralazine for HTN  --c/w metoprolol 25mg BID  --c/w home ASA/plavix for CAD    #Endo  -- FSG-862, AG-43, Potassium- 6.9, BHB >8. C/w DKA Protocol.   -- Insulin gtt @6u/h   -- When FSG< 250 add D5 + 1/2NS @150cc/h.   -- Transition to Lantus when pH >7.3, HCO3>18, AG wnl   -- Monitor for gap closure. FSG q1h, BMP q4h- monitor K+.       #Heme  --Anemia of chronic disease in setting of ESRD, at baseline.       #ID  --- URI symptoms on admission, improving. Afebrile and without leukocytosis.    --- RVP negative     #Renal  -- Emergent HD in setting of significant electrolyte abnormalities today.   -- ESRD, on dialysis Mon/Tues/Thr/Sat.   -- c/w home sensipar, renagel    #FEN/GI  --NPO    #DVT ppx  --HSQ    #GOC  --Full code 63 y/o F with a PMHx significant for T1DM (on insulin pump, with multiple admissions for DKA), HTN, HLD, former smoker, MI, CAD s/p PCI to RCA and brachytherapy in Aug 2017, dCHF (last TTE in Nov 2017 mild-mod MR, mild AS, mild-mod TR, EF 40-45%), chronic LLE pain and edema in setting of severe PVD s/p L & R fem-pop bypass graft with fem-pop bypass revisions, left subclavian vein stenosis s/p stent, ESRD on HD (Mon/Tu/Thr/Sat) via L AVF with oliguria, CVA with memory deficit, and depression presented with generalized weakness and diffuse abdominal pain/N/V x 1 day and found to be in DKA with VBG pH 7.2, HCO3 11, AG 43, BMP glucose 1049, and B-hydroxy butyrate >8 and admitted to MICU for management of DKA.     PLAN:  #Neuro  --At baseline AAOx 3, no issues. C/w atorvastatin for h/o CVA.     #Pulmonary  --No active issues. No evidence of fluid overload on physical exam.    #Cardiac  --c/w home lisinopril, hydralazine for HTN  --c/w metoprolol 25mg BID  --c/w home ASA/plavix for CAD    #Endo  -- FSG-862, AG-43, Potassium- 6.9, BHB >8. C/w DKA Protocol.   -- Insulin gtt @ 1u/h   -- When FSG< 250 add D5 + 1/2NS @150cc/h.   -- Transition to Lantus when pH >7.3, HCO3>18, AG wnl   -- Monitor for gap closure. FSG q1h, BMP q2h.  -- Monitor serum K    #Heme  --Anemia of chronic disease in setting of ESRD, at baseline.     #ID  --- URI symptoms on admission, improving. Afebrile and without leukocytosis.    --- RVP negative     #Renal  -- Emergent HD in setting of significant electrolyte abnormalities today. Serum K 6.9, not hemolyzed.   -- ESRD, on dialysis Mon/Tues/Thr/Sat.  -- c/w home sensipar, renagel    #FEN/GI  -- NPO    #DVT ppx  -- HSQ    #GOC  -- Full code    Gin Garcia PGY-3  [id number], Spectra [id number] 63 y/o F with a PMHx significant for T1DM (on insulin pump, with multiple admissions for DKA), HTN, HLD, former smoker, MI, CAD s/p PCI to RCA and brachytherapy in Aug 2017, dCHF (last TTE in Nov 2017 mild-mod MR, mild AS, mild-mod TR, EF 40-45%), chronic LLE pain and edema in setting of severe PVD s/p L & R fem-pop bypass graft with fem-pop bypass revisions, left subclavian vein stenosis s/p stent, ESRD on HD (Mon/Tu/Thr/Sat) via L AVF with oliguria, CVA with memory deficit, and depression presented with generalized weakness and diffuse abdominal pain/N/V x 1 day and found to be in DKA with VBG pH 7.2, HCO3 11, AG 43, BMP glucose 1049, and B-hydroxy butyrate >8 and admitted to MICU for management of DKA.     PLAN:  #Neuro  -- At baseline AAOx 3, no issues. C/w atorvastatin for h/o CVA.   -- c/w Cymbalta    #Pulmonary  --No active issues. No evidence of fluid overload on physical exam.  -- Prelim CT Abd/Pelvis showing LLL opacities    #Cardiac  --c/w home lisinopril, hydralazine for HTN  --c/w metoprolol 25mg BID  --c/w home ASA/ plavix for CAD    #Endo  -- BMP glucose- 862, AG-43, Potassium- 6.9, BHB >8. C/w DKA Protocol.   -- Insulin gtt @1u/h   -- When FSG< 250 add D5 + 1/2NS @150cc/h.   -- Transition to Lantus when pH >7.3, HCO3>18, AG wnl   -- Monitor for gap closure. FSG q1h, BMP q2h.  -- Monitor serum K    #Heme  --- Anemia of chronic disease in setting of ESRD, at baseline.     #ID  --- URI symptoms on admission, improving. Afebrile and without leukocytosis.    --- RVP negative     #Renal  -- Emergent HD in setting of significant electrolyte abnormalities today. Serum K 6.9, not hemolyzed.   -- ESRD, on dialysis Mon/Tues/Thr/Sat.  -- c/w home sensipar, renagel    #FEN/GI  -- NPO    #DVT ppx  -- HSQ    #GOC  -- Full code    Gin Garcia PGY-3  [id number], Spectra [id number] 63 y/o F with a PMHx significant for T1DM (on insulin pump, with multiple admissions for DKA), HTN, HLD, former smoker, MI, CAD s/p PCI to RCA and brachytherapy in Aug 2017, dCHF (last TTE in Nov 2017 mild-mod MR, mild AS, mild-mod TR, EF 40-45%), chronic LLE pain and edema in setting of severe PVD s/p L & R fem-pop bypass graft with fem-pop bypass revisions, left subclavian vein stenosis s/p stent, ESRD on HD (Mon/Tu/Thr/Sat) via L AVF with oliguria, CVA with memory deficit, and depression presented with generalized weakness and diffuse abdominal pain/N/V x 1 day and found to be in DKA with VBG pH 7.2, HCO3 11, AG 43, BMP glucose 1049, and B-hydroxy butyrate >8 and admitted to MICU for management of DKA.     PLAN:  #Neuro  -- At baseline AAOx 3, no issues. C/w atorvastatin for h/o CVA.   -- c/w Cymbalta    #Pulmonary  -- No active issues. No evidence of fluid overload on physical exam.  -- Pt RVP negative, however presented w/ viral URI symptoms in setting of  LLL opacities perhaps     #Cardiac  --c/w home lisinopril, hydralazine for HTN  --c/w metoprolol 25mg BID  --c/w home ASA/ plavix for CAD    #Endo  -- BMP glucose- 862, AG-43, Potassium- 6.9, BHB >8. C/w DKA Protocol.   -- Insulin gtt @1u/h   -- When FSG< 250 add D5 + 1/2NS @150cc/h.   -- Transition to Lantus when pH >7.3, HCO3>18, AG wnl   -- Monitor for gap closure. FSG q1h, BMP q2h.  -- Monitor serum K    #Heme  --- Anemia of chronic disease in setting of ESRD, at baseline.     #ID  --- URI symptoms on admission, improving. Afebrile and without leukocytosis.    --- RVP negative     #Renal  -- Emergent HD in setting of significant electrolyte abnormalities today. Serum K 6.9, not hemolyzed.   -- ESRD, on dialysis Mon/Tues/Thr/Sat.  -- c/w home sensipar, renagel    #FEN/GI  -- NPO    #DVT ppx  -- HSQ    #GOC  -- Full code    Gin Garcia PGY-3  [id number], Spectra [id number] 61 y/o F with a PMHx significant for T1DM (on insulin pump, with multiple admissions for DKA), HTN, HLD, former smoker, MI, CAD s/p PCI to RCA and brachytherapy in Aug 2017, dCHF (last TTE in Nov 2017 mild-mod MR, mild AS, mild-mod TR, EF 40-45%), chronic LLE pain and edema in setting of severe PVD s/p L & R fem-pop bypass graft with fem-pop bypass revisions, left subclavian vein stenosis s/p stent, ESRD on HD (Mon/Tu/Thr/Sat) via L AVF with oliguria, CVA with memory deficit, and depression presented with generalized weakness, diffuse abdominal pain/N/V x 1 day, and recent flu-like symptoms and found to be in DKA with VBG pH 7.2, HCO3 11, AG 43, BMP glucose 1049, and B-hydroxy butyrate >8 and admitted to MICU for management of DKA, possibly 2/2 viral URI +/- superimposed bacterial PNA based on CT A/P.    PLAN:  #Neuro  -- At baseline AAOx 3, no issues. C/w atorvastatin for h/o CVA.   -- C/w Cymbalta for depression.    #Pulmonary  - CT A/P with LLL opacities, which could be infectious in etiology. Pt also with mild leukocytosis. Will start CAP coverage with ceftriaxone and azithromycin for possible PNA.    #Cardiac  -- C/w home lisinopril, hydralazine for HTN  -- C/w metoprolol 25mg BID  -- C/w home ASA/ plavix for CAD    #Endo  -- BMP glucose-862, AG-43, Potassium-6.9, BHB >8. C/w DKA Protocol.   -- Insulin gtt @1u/h   -- When FSG< 250 add D5 + 1/2NS @150cc/h.   -- Transition to Lantus when pH >7.3, HCO3>18, AG wnl   -- Monitor for gap closure. FSG q1h, BMP q2h.  -- Monitor serum K    #Heme  -- Anemia of chronic disease in setting of ESRD, at baseline.     #ID  -- Pt endorsing recent flu-like symptoms, though RVP negative. However, CT A/P showing LLL opacities, which could be infectious in etiology. Will start CAP coverage with ceftriaxone and azithromycin for possible PNA.  -- F/u blood cultures    #Renal  -- Emergent HD in setting of significant electrolyte abnormalities today. Serum K 6.9, not hemolyzed.   -- ESRD, on dialysis Mon/Tues/Thr/Sat.  -- C/w home sensipar, renagel    #FEN/GI  -- NPO    #DVT ppx  -- HSQ    #GOC  -- Full code    Gin Sanchezang PGY-3  [id number], Spectra [id number] 63 y/o F with a PMHx significant for T1DM (on insulin pump, with multiple admissions for DKA), HTN, HLD, former smoker, MI, CAD s/p PCI to RCA and brachytherapy in Aug 2017, dCHF (last TTE in Nov 2017 mild-mod MR, mild AS, mild-mod TR, EF 40-45%), chronic LLE pain and edema in setting of severe PVD s/p L & R fem-pop bypass graft with fem-pop bypass revisions, left subclavian vein stenosis s/p stent, ESRD on HD (Mon/Tu/Thr/Sat) via L AVF with oliguria, CVA with memory deficit, and depression presented with generalized weakness, diffuse abdominal pain/N/V x 1 day, and recent flu-like symptoms and found to be in DKA with VBG pH 7.2, HCO3 11, AG 43, BMP glucose 1049, and B-hydroxy butyrate >8 and admitted to MICU for management of DKA, possibly 2/2 viral URI +/- superimposed bacterial PNA based on CT A/P.    PLAN:  #Neuro  -- At baseline AAOx 3, no issues. C/w atorvastatin for h/o CVA.   -- C/w Cymbalta for depression.    #Pulmonary  - CT A/P with LLL opacities, which could be infectious in etiology. Pt also with mild leukocytosis. Will start CAP coverage with ceftriaxone and azithromycin for possible PNA.    #Cardiac  -- C/w home lisinopril, hydralazine for HTN  -- C/w metoprolol 25mg BID  -- C/w home ASA/ plavix for CAD    #Endo  -- BMP glucose-1049, AG-43, Potassium-6.9, BHB >8. C/w DKA Protocol.   -- Insulin gtt @1u/h   -- When FSG< 250 add D5 + 1/2NS @150cc/h.   -- Transition to Lantus when pH >7.3, HCO3>18, AG wnl   -- Monitor for gap closure. FSG q1h, BMP q2h.  -- Monitor serum K    #Heme  -- Anemia of chronic disease in setting of ESRD, at baseline.     #ID  -- Pt endorsing recent flu-like symptoms, though RVP negative. However, CT A/P showing LLL opacities, which could be infectious in etiology. Will start CAP coverage with ceftriaxone and azithromycin for possible PNA.  -- F/u blood cultures    #Renal  -- Emergent HD in setting of significant electrolyte abnormalities today. Serum K 6.9, not hemolyzed.   -- ESRD, on dialysis Mon/Tues/Thr/Sat.  -- C/w home sensipar, renagel    #FEN/GI  -- NPO    #DVT ppx  -- HSQ    #GOC  -- Full code    Gin Sanchezang PGY-3  [id number], Spectra [id number]

## 2018-01-13 NOTE — ED PROVIDER NOTE - ATTENDING CONTRIBUTION TO CARE
Attending MD Tejeda:  I personally have seen and examined this patient.  Resident note reviewed and agree on plan of care and except where noted.  See MDM for details.

## 2018-01-13 NOTE — PROCEDURE NOTE - NSPROCDETAILS_GEN_ALL_CORE
sterile dressing applied/sterile technique, catheter placed/guidewire recovered/lumen(s) aspirated and flushed/ultrasound guidance

## 2018-01-13 NOTE — ED PROVIDER NOTE - OBJECTIVE STATEMENT
Nikhil JAMIL: 63 y/o female with hx of DM1 on Insulin pump, CAD with stent placed last month, ESRD on HD (M,T,Th and Sat) here with abd pain and N/V. Patient reports she had flu like symptoms this week and tonight she developed acute nausea and NBNB vomiting with associated abd pain. Patient denies fever, SOB, back pain, SOB, cough, trauma, CP or palpitations. Exam shows an ill ptient with clear lungs and cardiac S1S2 noted, cardiac S1S2 noted, RRR. Abd soft and tender and nondistended w/o rebound or guarding. No CVA tenderness. Normal skin and chronic LLE edema. Consider DKA, Pulm edema, Abd pathology (Pancreatitis, Gastritis, PUD), Electrolyte abnormlaity, CAP. Plan CBC, CMP, Cardiacs, VBG, Lipase, CXR, EKG abd CT abd and reassess. Patient to be admitted.

## 2018-01-13 NOTE — CONSULT NOTE ADULT - PROBLEM SELECTOR RECOMMENDATION 9
Diabetes Education and Nutrition Eval  Continue with insulin gtt for now.  Pump removed and in her bag at the bedside  Goal glucose 100-200 given brittle diabetes  Outpt. endo follow-up  Outpt. optho, podiatry, nephrology  Plan to d/c on pump

## 2018-01-13 NOTE — CONSULT NOTE ADULT - PROBLEM SELECTOR RECOMMENDATION 2
2/2 metabolic acidosis (DKA) and ESRD  largely corrected with ongoing correction of acidosis and other medical management overnight  dialysis today for further management and stabilization of acid/base and electrolyte abnormalities

## 2018-01-14 LAB
ACETONE SERPL-MCNC: SIGNIFICANT CHANGE UP
ANION GAP SERPL CALC-SCNC: 11 MMOL/L — SIGNIFICANT CHANGE UP (ref 5–17)
ANION GAP SERPL CALC-SCNC: 14 MMOL/L — SIGNIFICANT CHANGE UP (ref 5–17)
ANION GAP SERPL CALC-SCNC: 15 MMOL/L — SIGNIFICANT CHANGE UP (ref 5–17)
ANION GAP SERPL CALC-SCNC: 16 MMOL/L — SIGNIFICANT CHANGE UP (ref 5–17)
B-OH-BUTYR SERPL-SCNC: 0.5 MMOL/L — HIGH
BASE EXCESS BLDV CALC-SCNC: 6.2 MMOL/L — HIGH (ref -2–2)
BUN SERPL-MCNC: 16 MG/DL — SIGNIFICANT CHANGE UP (ref 7–23)
BUN SERPL-MCNC: 18 MG/DL — SIGNIFICANT CHANGE UP (ref 7–23)
BUN SERPL-MCNC: 20 MG/DL — SIGNIFICANT CHANGE UP (ref 7–23)
BUN SERPL-MCNC: 21 MG/DL — SIGNIFICANT CHANGE UP (ref 7–23)
CA-I SERPL-SCNC: 1.09 MMOL/L — LOW (ref 1.12–1.3)
CALCIUM SERPL-MCNC: 8 MG/DL — LOW (ref 8.4–10.5)
CALCIUM SERPL-MCNC: 8.1 MG/DL — LOW (ref 8.4–10.5)
CALCIUM SERPL-MCNC: 8.1 MG/DL — LOW (ref 8.4–10.5)
CALCIUM SERPL-MCNC: 8.2 MG/DL — LOW (ref 8.4–10.5)
CHLORIDE BLDV-SCNC: 96 MMOL/L — SIGNIFICANT CHANGE UP (ref 96–108)
CHLORIDE SERPL-SCNC: 95 MMOL/L — LOW (ref 96–108)
CHLORIDE SERPL-SCNC: 96 MMOL/L — SIGNIFICANT CHANGE UP (ref 96–108)
CHLORIDE SERPL-SCNC: 96 MMOL/L — SIGNIFICANT CHANGE UP (ref 96–108)
CHLORIDE SERPL-SCNC: 97 MMOL/L — SIGNIFICANT CHANGE UP (ref 96–108)
CK MB BLD-MCNC: 4.2 % — HIGH (ref 0–3.5)
CK MB BLD-MCNC: 5.6 % — HIGH (ref 0–3.5)
CK MB CFR SERPL CALC: 12.8 NG/ML — HIGH (ref 0–3.8)
CK MB CFR SERPL CALC: 7.9 NG/ML — HIGH (ref 0–3.8)
CK SERPL-CCNC: 190 U/L — HIGH (ref 25–170)
CK SERPL-CCNC: 229 U/L — HIGH (ref 25–170)
CO2 BLDV-SCNC: 33 MMOL/L — HIGH (ref 22–30)
CO2 SERPL-SCNC: 27 MMOL/L — SIGNIFICANT CHANGE UP (ref 22–31)
CO2 SERPL-SCNC: 28 MMOL/L — SIGNIFICANT CHANGE UP (ref 22–31)
CO2 SERPL-SCNC: 29 MMOL/L — SIGNIFICANT CHANGE UP (ref 22–31)
CO2 SERPL-SCNC: 31 MMOL/L — SIGNIFICANT CHANGE UP (ref 22–31)
CREAT SERPL-MCNC: 2.91 MG/DL — HIGH (ref 0.5–1.3)
CREAT SERPL-MCNC: 3.32 MG/DL — HIGH (ref 0.5–1.3)
CREAT SERPL-MCNC: 3.74 MG/DL — HIGH (ref 0.5–1.3)
CREAT SERPL-MCNC: 4.08 MG/DL — HIGH (ref 0.5–1.3)
GAS PNL BLDV: 137 MMOL/L — SIGNIFICANT CHANGE UP (ref 136–145)
GAS PNL BLDV: SIGNIFICANT CHANGE UP
GLUCOSE BLDC GLUCOMTR-MCNC: 103 MG/DL — HIGH (ref 70–99)
GLUCOSE BLDC GLUCOMTR-MCNC: 108 MG/DL — HIGH (ref 70–99)
GLUCOSE BLDC GLUCOMTR-MCNC: 114 MG/DL — HIGH (ref 70–99)
GLUCOSE BLDC GLUCOMTR-MCNC: 117 MG/DL — HIGH (ref 70–99)
GLUCOSE BLDC GLUCOMTR-MCNC: 124 MG/DL — HIGH (ref 70–99)
GLUCOSE BLDC GLUCOMTR-MCNC: 126 MG/DL — HIGH (ref 70–99)
GLUCOSE BLDC GLUCOMTR-MCNC: 129 MG/DL — HIGH (ref 70–99)
GLUCOSE BLDC GLUCOMTR-MCNC: 132 MG/DL — HIGH (ref 70–99)
GLUCOSE BLDC GLUCOMTR-MCNC: 136 MG/DL — HIGH (ref 70–99)
GLUCOSE BLDC GLUCOMTR-MCNC: 138 MG/DL — HIGH (ref 70–99)
GLUCOSE BLDC GLUCOMTR-MCNC: 145 MG/DL — HIGH (ref 70–99)
GLUCOSE BLDC GLUCOMTR-MCNC: 148 MG/DL — HIGH (ref 70–99)
GLUCOSE BLDC GLUCOMTR-MCNC: 148 MG/DL — HIGH (ref 70–99)
GLUCOSE BLDC GLUCOMTR-MCNC: 155 MG/DL — HIGH (ref 70–99)
GLUCOSE BLDC GLUCOMTR-MCNC: 155 MG/DL — HIGH (ref 70–99)
GLUCOSE BLDC GLUCOMTR-MCNC: 165 MG/DL — HIGH (ref 70–99)
GLUCOSE BLDC GLUCOMTR-MCNC: 173 MG/DL — HIGH (ref 70–99)
GLUCOSE BLDC GLUCOMTR-MCNC: 99 MG/DL — SIGNIFICANT CHANGE UP (ref 70–99)
GLUCOSE BLDV-MCNC: 157 MG/DL — HIGH (ref 70–99)
GLUCOSE SERPL-MCNC: 110 MG/DL — HIGH (ref 70–99)
GLUCOSE SERPL-MCNC: 136 MG/DL — HIGH (ref 70–99)
GLUCOSE SERPL-MCNC: 155 MG/DL — HIGH (ref 70–99)
GLUCOSE SERPL-MCNC: 156 MG/DL — HIGH (ref 70–99)
HCO3 BLDV-SCNC: 32 MMOL/L — HIGH (ref 21–29)
HCT VFR BLD CALC: 23 % — LOW (ref 34.5–45)
HCT VFR BLDA CALC: 23 % — LOW (ref 39–50)
HGB BLD CALC-MCNC: 7.4 G/DL — LOW (ref 11.5–15.5)
HGB BLD-MCNC: 7.8 G/DL — LOW (ref 11.5–15.5)
HOROWITZ INDEX BLDV+IHG-RTO: 28 — SIGNIFICANT CHANGE UP
LACTATE BLDV-MCNC: 0.7 MMOL/L — SIGNIFICANT CHANGE UP (ref 0.7–2)
MAGNESIUM SERPL-MCNC: 2 MG/DL — SIGNIFICANT CHANGE UP (ref 1.6–2.6)
MAGNESIUM SERPL-MCNC: 2.1 MG/DL — SIGNIFICANT CHANGE UP (ref 1.6–2.6)
MCHC RBC-ENTMCNC: 34.1 GM/DL — SIGNIFICANT CHANGE UP (ref 32–36)
MCHC RBC-ENTMCNC: 34.6 PG — HIGH (ref 27–34)
MCV RBC AUTO: 102 FL — HIGH (ref 80–100)
OTHER CELLS CSF MANUAL: 7 ML/DL — LOW (ref 18–22)
PCO2 BLDV: 55 MMHG — HIGH (ref 35–50)
PH BLDV: 7.38 — SIGNIFICANT CHANGE UP (ref 7.35–7.45)
PHOSPHATE SERPL-MCNC: 3.2 MG/DL — SIGNIFICANT CHANGE UP (ref 2.5–4.5)
PHOSPHATE SERPL-MCNC: 3.3 MG/DL — SIGNIFICANT CHANGE UP (ref 2.5–4.5)
PHOSPHATE SERPL-MCNC: 3.5 MG/DL — SIGNIFICANT CHANGE UP (ref 2.5–4.5)
PHOSPHATE SERPL-MCNC: 3.8 MG/DL — SIGNIFICANT CHANGE UP (ref 2.5–4.5)
PLATELET # BLD AUTO: 287 K/UL — SIGNIFICANT CHANGE UP (ref 150–400)
PO2 BLDV: 40 MMHG — SIGNIFICANT CHANGE UP (ref 25–45)
POTASSIUM BLDV-SCNC: 3.5 MMOL/L — SIGNIFICANT CHANGE UP (ref 3.5–5)
POTASSIUM SERPL-MCNC: 3.6 MMOL/L — SIGNIFICANT CHANGE UP (ref 3.5–5.3)
POTASSIUM SERPL-MCNC: 3.7 MMOL/L — SIGNIFICANT CHANGE UP (ref 3.5–5.3)
POTASSIUM SERPL-MCNC: 3.8 MMOL/L — SIGNIFICANT CHANGE UP (ref 3.5–5.3)
POTASSIUM SERPL-MCNC: 3.9 MMOL/L — SIGNIFICANT CHANGE UP (ref 3.5–5.3)
POTASSIUM SERPL-SCNC: 3.6 MMOL/L — SIGNIFICANT CHANGE UP (ref 3.5–5.3)
POTASSIUM SERPL-SCNC: 3.7 MMOL/L — SIGNIFICANT CHANGE UP (ref 3.5–5.3)
POTASSIUM SERPL-SCNC: 3.8 MMOL/L — SIGNIFICANT CHANGE UP (ref 3.5–5.3)
POTASSIUM SERPL-SCNC: 3.9 MMOL/L — SIGNIFICANT CHANGE UP (ref 3.5–5.3)
RBC # BLD: 2.26 M/UL — LOW (ref 3.8–5.2)
RBC # FLD: 12.2 % — SIGNIFICANT CHANGE UP (ref 10.3–14.5)
SAO2 % BLDV: 66 % — LOW (ref 67–88)
SODIUM SERPL-SCNC: 138 MMOL/L — SIGNIFICANT CHANGE UP (ref 135–145)
SODIUM SERPL-SCNC: 139 MMOL/L — SIGNIFICANT CHANGE UP (ref 135–145)
TROPONIN T SERPL-MCNC: 2.88 NG/ML — HIGH (ref 0–0.06)
TROPONIN T SERPL-MCNC: 3.19 NG/ML — HIGH (ref 0–0.06)
WBC # BLD: 8.3 K/UL — SIGNIFICANT CHANGE UP (ref 3.8–10.5)
WBC # FLD AUTO: 8.3 K/UL — SIGNIFICANT CHANGE UP (ref 3.8–10.5)

## 2018-01-14 PROCEDURE — 99291 CRITICAL CARE FIRST HOUR: CPT

## 2018-01-14 PROCEDURE — 99232 SBSQ HOSP IP/OBS MODERATE 35: CPT

## 2018-01-14 PROCEDURE — 93010 ELECTROCARDIOGRAM REPORT: CPT

## 2018-01-14 RX ORDER — GLUCAGON INJECTION, SOLUTION 0.5 MG/.1ML
1 INJECTION, SOLUTION SUBCUTANEOUS ONCE
Qty: 0 | Refills: 0 | Status: DISCONTINUED | OUTPATIENT
Start: 2018-01-14 | End: 2018-01-17

## 2018-01-14 RX ORDER — DEXTROSE 50 % IN WATER 50 %
25 SYRINGE (ML) INTRAVENOUS ONCE
Qty: 0 | Refills: 0 | Status: DISCONTINUED | OUTPATIENT
Start: 2018-01-14 | End: 2018-01-17

## 2018-01-14 RX ORDER — DEXTROSE 50 % IN WATER 50 %
1 SYRINGE (ML) INTRAVENOUS ONCE
Qty: 0 | Refills: 0 | Status: DISCONTINUED | OUTPATIENT
Start: 2018-01-14 | End: 2018-01-17

## 2018-01-14 RX ORDER — ONDANSETRON 8 MG/1
8 TABLET, FILM COATED ORAL ONCE
Qty: 0 | Refills: 0 | Status: COMPLETED | OUTPATIENT
Start: 2018-01-14 | End: 2018-01-14

## 2018-01-14 RX ORDER — INSULIN GLARGINE 100 [IU]/ML
15 INJECTION, SOLUTION SUBCUTANEOUS ONCE
Qty: 0 | Refills: 0 | Status: COMPLETED | OUTPATIENT
Start: 2018-01-14 | End: 2018-01-14

## 2018-01-14 RX ORDER — METOCLOPRAMIDE HCL 10 MG
10 TABLET ORAL ONCE
Qty: 0 | Refills: 0 | Status: COMPLETED | OUTPATIENT
Start: 2018-01-14 | End: 2018-01-14

## 2018-01-14 RX ORDER — SODIUM CHLORIDE 9 MG/ML
1000 INJECTION, SOLUTION INTRAVENOUS
Qty: 0 | Refills: 0 | Status: DISCONTINUED | OUTPATIENT
Start: 2018-01-14 | End: 2018-01-17

## 2018-01-14 RX ORDER — DEXTROSE 50 % IN WATER 50 %
12.5 SYRINGE (ML) INTRAVENOUS ONCE
Qty: 0 | Refills: 0 | Status: DISCONTINUED | OUTPATIENT
Start: 2018-01-14 | End: 2018-01-17

## 2018-01-14 RX ORDER — INSULIN LISPRO 100/ML
VIAL (ML) SUBCUTANEOUS
Qty: 0 | Refills: 0 | Status: DISCONTINUED | OUTPATIENT
Start: 2018-01-14 | End: 2018-01-16

## 2018-01-14 RX ADMIN — Medication 25 MILLIGRAM(S): at 18:16

## 2018-01-14 RX ADMIN — Medication 50 MILLIGRAM(S): at 13:07

## 2018-01-14 RX ADMIN — OXYCODONE AND ACETAMINOPHEN 1 TABLET(S): 5; 325 TABLET ORAL at 23:00

## 2018-01-14 RX ADMIN — Medication 81 MILLIGRAM(S): at 11:48

## 2018-01-14 RX ADMIN — INSULIN GLARGINE 15 UNIT(S): 100 INJECTION, SOLUTION SUBCUTANEOUS at 11:10

## 2018-01-14 RX ADMIN — Medication 25 MILLIGRAM(S): at 06:55

## 2018-01-14 RX ADMIN — ATORVASTATIN CALCIUM 20 MILLIGRAM(S): 80 TABLET, FILM COATED ORAL at 21:18

## 2018-01-14 RX ADMIN — ONDANSETRON 8 MILLIGRAM(S): 8 TABLET, FILM COATED ORAL at 14:37

## 2018-01-14 RX ADMIN — DULOXETINE HYDROCHLORIDE 60 MILLIGRAM(S): 30 CAPSULE, DELAYED RELEASE ORAL at 11:48

## 2018-01-14 RX ADMIN — CLOPIDOGREL BISULFATE 75 MILLIGRAM(S): 75 TABLET, FILM COATED ORAL at 21:18

## 2018-01-14 RX ADMIN — CINACALCET 60 MILLIGRAM(S): 30 TABLET, FILM COATED ORAL at 11:48

## 2018-01-14 RX ADMIN — OXYCODONE AND ACETAMINOPHEN 1 TABLET(S): 5; 325 TABLET ORAL at 22:18

## 2018-01-14 RX ADMIN — Medication 50 MILLIGRAM(S): at 21:18

## 2018-01-14 RX ADMIN — LISINOPRIL 40 MILLIGRAM(S): 2.5 TABLET ORAL at 06:55

## 2018-01-14 RX ADMIN — Medication 50 MILLIGRAM(S): at 06:55

## 2018-01-14 NOTE — CONSULT NOTE ADULT - ASSESSMENT
62 f with  Diabetes type 1/DKA- continue Insulin coverage, Endocrine follow  ESRD- HD  CAD- stable.  HTN control  Abdominal pain- observe closely, if persistent will order US abdomen.  d/w MICU/  Pierce Viera MD pager [phone]

## 2018-01-14 NOTE — DIETITIAN INITIAL EVALUATION ADULT. - ORAL INTAKE PTA
pt reports overall good intake at home 1 day prior to her symptoms starting; noted from per previous RD note pt tries to consume 45-60Gm CHO at meal times, had a insulin pump, insulin:CHO 1:15

## 2018-01-14 NOTE — PROGRESS NOTE ADULT - SUBJECTIVE AND OBJECTIVE BOX
CHIEF COMPLAINT: Patient is a 62y old  Female who presents with a chief complaint of Nausea, Weakness, Diffuse abdominal pain (13 Jan 2018 04:03)    Interval Events:    REVIEW OF SYSTEMS:      OBJECTIVE:  ICU Vital Signs Last 24 Hrs  T(C): 36.6 (14 Jan 2018 04:00), Max: 36.6 (13 Jan 2018 17:50)  T(F): 97.9 (14 Jan 2018 04:00), Max: 97.9 (13 Jan 2018 17:50)  HR: 78 (14 Jan 2018 07:00) (68 - 78)  BP: 131/61 (14 Jan 2018 07:00) (114/58 - 165/82)  BP(mean): 88 (14 Jan 2018 07:00) (79 - 117)  ABP: --  ABP(mean): --  RR: 16 (14 Jan 2018 07:00) (12 - 18)  SpO2: 99% (14 Jan 2018 07:00) (89% - 100%)        01-13 @ 07:01  -  01-14 @ 07:00  --------------------------------------------------------  IN: 617 mL / OUT: 0 mL / NET: 617 mL      CAPILLARY BLOOD GLUCOSE      POCT Blood Glucose.: 136 mg/dL (14 Jan 2018 08:07)      PHYSICAL EXAM:      HOSPITAL MEDICATIONS:  MEDICATIONS  (STANDING):  aspirin enteric coated 81 milliGRAM(s) Oral daily  atorvastatin 20 milliGRAM(s) Oral at bedtime  cinacalcet 60 milliGRAM(s) Oral daily  clopidogrel Tablet 75 milliGRAM(s) Oral at bedtime  DULoxetine 60 milliGRAM(s) Oral daily  heparin  Injectable 5000 Unit(s) SubCutaneous every 8 hours  hydrALAZINE 50 milliGRAM(s) Oral every 8 hours  insulin Infusion 1 Unit(s)/Hr (1 mL/Hr) IV Continuous <Continuous>  lisinopril 40 milliGRAM(s) Oral daily  metoprolol     tartrate 25 milliGRAM(s) Oral two times a day    MEDICATIONS  (PRN):  oxyCODONE    5 mG/acetaminophen 325 mG 1 Tablet(s) Oral every 6 hours PRN Moderate to Severe Pain      LABS:  (01-14 @ 00:20)                        7.8  8.3 )-----------( 287                 23.0    Neutrophils = -- (--%)  Lymphocytes = -- (--%)  Eosinophils = -- (--%)  Basophils = -- (--%)  Monocytes = -- (--%)  Bands = --%    WBC Trend: 8.3<--, 11.6<--, 12.1<--  Hb Trend: 7.8<--, 7.4<--, 8.5<--  Plt Trend: 287<--, 284<--, 374<--  01-14    138  |  95<L>  |  18  ----------------------------<  155<H>  3.8   |  27  |  3.32<H>    Ca    8.0<L>      14 Jan 2018 04:24  Phos  3.3     01-14  Mg     2.0     01-14    TPro  6.6  /  Alb  3.7  /  TBili  0.3  /  DBili  x   /  AST  122<H>  /  ALT  44  /  AlkPhos  168<H>  01-13    Creatinine Trend: 3.32<--, 2.91<--, 2.67<--, 7.07<--, 6.76<--, 6.80<--  PT/INR - ( 13 Jan 2018 06:15 )   PT: 12.3 sec;   INR: 1.13 ratio         PTT - ( 13 Jan 2018 06:15 )  PTT:27.4 sec    Arterial Blood Gas:  01-13 @ 05:57  7.34/36/75/19/94/-5.8  ABG lactate: --    Venous Blood Gas:  01-14 @ 08:14  7.37/56/40/32/66  VBG Lactate: 0.8  Venous Blood Gas:  01-14 @ 04:14  7.37/55/35/31/56  VBG Lactate: 1.4  Venous Blood Gas:  01-14 @ 00:16  7.38/55/40/32/66  VBG Lactate: 0.7  Venous Blood Gas:  01-13 @ 20:06  7.44/46/118/31/97  VBG Lactate: 0.6  Venous Blood Gas:  01-13 @ 16:24  7.34/55/46/29/73  VBG Lactate: 1.9  Venous Blood Gas:  01-13 @ 11:35  7.30/54/48/26/73  VBG Lactate: 2.0  Venous Blood Gas:  01-13 @ 08:44  7.27/54/41/24/58  VBG Lactate: 1.9  Venous Blood Gas:  01-13 @ 01:15  7.20/30/39/11/52  VBG Lactate: 4.7    CARDIAC MARKERS ( 13 Jan 2018 09:39 )  Trop 2.57 ng/mL<H> /  U/L<H> / CKMB x       CARDIAC MARKERS ( 13 Jan 2018 01:15 )  Trop 0.19 ng/mL<H> / CK x     / CKMB x             MICROBIOLOGY:   Blood Cx:  Urine Cx:  Sputum Cx:  Legionella:  RVP:    RADIOLOGY:  X Ray:  CT:  MRI:  Ultrasound:  [ ] Reviewed and interpreted by me    EKG: CHIEF COMPLAINT: Patient is a 62y old  Female who presents with a chief complaint of Nausea, Weakness, Diffuse abdominal pain (13 Jan 2018 04:03)    Interval Events: No acute events overnight. patient feels much better than previous day where she     REVIEW OF SYSTEMS:      OBJECTIVE:  ICU Vital Signs Last 24 Hrs  T(C): 36.6 (14 Jan 2018 04:00), Max: 36.6 (13 Jan 2018 17:50)  T(F): 97.9 (14 Jan 2018 04:00), Max: 97.9 (13 Jan 2018 17:50)  HR: 78 (14 Jan 2018 07:00) (68 - 78)  BP: 131/61 (14 Jan 2018 07:00) (114/58 - 165/82)  BP(mean): 88 (14 Jan 2018 07:00) (79 - 117)  ABP: --  ABP(mean): --  RR: 16 (14 Jan 2018 07:00) (12 - 18)  SpO2: 99% (14 Jan 2018 07:00) (89% - 100%)        01-13 @ 07:01  -  01-14 @ 07:00  --------------------------------------------------------  IN: 617 mL / OUT: 0 mL / NET: 617 mL      CAPILLARY BLOOD GLUCOSE      POCT Blood Glucose.: 136 mg/dL (14 Jan 2018 08:07)      PHYSICAL EXAM:      HOSPITAL MEDICATIONS:  MEDICATIONS  (STANDING):  aspirin enteric coated 81 milliGRAM(s) Oral daily  atorvastatin 20 milliGRAM(s) Oral at bedtime  cinacalcet 60 milliGRAM(s) Oral daily  clopidogrel Tablet 75 milliGRAM(s) Oral at bedtime  DULoxetine 60 milliGRAM(s) Oral daily  heparin  Injectable 5000 Unit(s) SubCutaneous every 8 hours  hydrALAZINE 50 milliGRAM(s) Oral every 8 hours  insulin Infusion 1 Unit(s)/Hr (1 mL/Hr) IV Continuous <Continuous>  lisinopril 40 milliGRAM(s) Oral daily  metoprolol     tartrate 25 milliGRAM(s) Oral two times a day    MEDICATIONS  (PRN):  oxyCODONE    5 mG/acetaminophen 325 mG 1 Tablet(s) Oral every 6 hours PRN Moderate to Severe Pain      LABS:  (01-14 @ 00:20)                        7.8  8.3 )-----------( 287                 23.0    Neutrophils = -- (--%)  Lymphocytes = -- (--%)  Eosinophils = -- (--%)  Basophils = -- (--%)  Monocytes = -- (--%)  Bands = --%    WBC Trend: 8.3<--, 11.6<--, 12.1<--  Hb Trend: 7.8<--, 7.4<--, 8.5<--  Plt Trend: 287<--, 284<--, 374<--  01-14    138  |  95<L>  |  18  ----------------------------<  155<H>  3.8   |  27  |  3.32<H>    Ca    8.0<L>      14 Jan 2018 04:24  Phos  3.3     01-14  Mg     2.0     01-14    TPro  6.6  /  Alb  3.7  /  TBili  0.3  /  DBili  x   /  AST  122<H>  /  ALT  44  /  AlkPhos  168<H>  01-13    Creatinine Trend: 3.32<--, 2.91<--, 2.67<--, 7.07<--, 6.76<--, 6.80<--  PT/INR - ( 13 Jan 2018 06:15 )   PT: 12.3 sec;   INR: 1.13 ratio         PTT - ( 13 Jan 2018 06:15 )  PTT:27.4 sec    Arterial Blood Gas:  01-13 @ 05:57  7.34/36/75/19/94/-5.8  ABG lactate: --    Venous Blood Gas:  01-14 @ 08:14  7.37/56/40/32/66  VBG Lactate: 0.8  Venous Blood Gas:  01-14 @ 04:14  7.37/55/35/31/56  VBG Lactate: 1.4  Venous Blood Gas:  01-14 @ 00:16  7.38/55/40/32/66  VBG Lactate: 0.7  Venous Blood Gas:  01-13 @ 20:06  7.44/46/118/31/97  VBG Lactate: 0.6  Venous Blood Gas:  01-13 @ 16:24  7.34/55/46/29/73  VBG Lactate: 1.9  Venous Blood Gas:  01-13 @ 11:35  7.30/54/48/26/73  VBG Lactate: 2.0  Venous Blood Gas:  01-13 @ 08:44  7.27/54/41/24/58  VBG Lactate: 1.9  Venous Blood Gas:  01-13 @ 01:15  7.20/30/39/11/52  VBG Lactate: 4.7    CARDIAC MARKERS ( 13 Jan 2018 09:39 )  Trop 2.57 ng/mL<H> /  U/L<H> / CKMB x       CARDIAC MARKERS ( 13 Jan 2018 01:15 )  Trop 0.19 ng/mL<H> / CK x     / CKMB x             MICROBIOLOGY:   Blood Cx:  Urine Cx:  Sputum Cx:  Legionella:  RVP:    RADIOLOGY:  X Ray:  CT:  MRI:  Ultrasound:  [ ] Reviewed and interpreted by me    EKG: CHIEF COMPLAINT: Patient is a 62y old  Female who presents with a chief complaint of Nausea, Weakness, Diffuse abdominal pain (13 Jan 2018 04:03)    Interval Events: No acute events overnight. patient feels much better than previous day where she felt tired w/ nausea but has that resolved    REVIEW OF SYSTEMS:  CONSTITUTIONAL: No fever, no chills  EYES: No eye pain, no visual disturbance  Mouth: no pain in mouth, no cavities  RESPIRATORY: No cough, No sob  CARDIOVASCULAR: No CP, no palpitations  GASTROINTESTINAL: no abdominal pain, no n/v/d  GENITOURINARY: No dysuria, no hematuria  NEUROLOGICAL: No headaches, no weakness  SKIN: No itching, no rashes  MUSCULOSKELETAL: No joint pain, no joint swelling    OBJECTIVE:  ICU Vital Signs Last 24 Hrs  T(C): 36.6 (14 Jan 2018 04:00), Max: 36.6 (13 Jan 2018 17:50)  T(F): 97.9 (14 Jan 2018 04:00), Max: 97.9 (13 Jan 2018 17:50)  HR: 78 (14 Jan 2018 07:00) (68 - 78)  BP: 131/61 (14 Jan 2018 07:00) (114/58 - 165/82)  BP(mean): 88 (14 Jan 2018 07:00) (79 - 117)  ABP: --  ABP(mean): --  RR: 16 (14 Jan 2018 07:00) (12 - 18)  SpO2: 99% (14 Jan 2018 07:00) (89% - 100%)        01-13 @ 07:01  -  01-14 @ 07:00  --------------------------------------------------------  IN: 617 mL / OUT: 0 mL / NET: 617 mL      CAPILLARY BLOOD GLUCOSE      POCT Blood Glucose.: 136 mg/dL (14 Jan 2018 08:07)      PHYSICAL EXAM:    GENERAL: NAD, well-developed  HEAD:  Atraumatic, Normocephalic  EYES: EOMI, PERRLA, conjunctiva and sclera clear  Mouth: MMM, no lesions  NECK: Supple, no appreciable masses, RIJ central line  Lung: normal work of breathing, cta b/l  Chest: S1&S2+, rrr, no m/r/g appreciated  ABDOMEN: bs+, soft, mild tenderness of RUQ, nd, no appreciable masses  : No irene catheter, no CVA tenderness  EXTREMITIES:  Peripheral Pulses Present in all extremities., No clubbing, cyanosis, or edema  Neuro: A&Ox3, no focal deficits  SKIN: warm and dry, no visible rashes  Line: RIJ triple lumen catheter    [facility] MEDICATIONS:  MEDICATIONS  (STANDING):  aspirin enteric coated 81 milliGRAM(s) Oral daily  atorvastatin 20 milliGRAM(s) Oral at bedtime  cinacalcet 60 milliGRAM(s) Oral daily  clopidogrel Tablet 75 milliGRAM(s) Oral at bedtime  DULoxetine 60 milliGRAM(s) Oral daily  heparin  Injectable 5000 Unit(s) SubCutaneous every 8 hours  hydrALAZINE 50 milliGRAM(s) Oral every 8 hours  insulin Infusion 1 Unit(s)/Hr (1 mL/Hr) IV Continuous <Continuous>  lisinopril 40 milliGRAM(s) Oral daily  metoprolol     tartrate 25 milliGRAM(s) Oral two times a day    MEDICATIONS  (PRN):  oxyCODONE    5 mG/acetaminophen 325 mG 1 Tablet(s) Oral every 6 hours PRN Moderate to Severe Pain      LABS:  (01-14 @ 00:20)                        7.8  8.3 )-----------( 287                 23.0    Neutrophils = -- (--%)  Lymphocytes = -- (--%)  Eosinophils = -- (--%)  Basophils = -- (--%)  Monocytes = -- (--%)  Bands = --%    WBC Trend: 8.3<--, 11.6<--, 12.1<--  Hb Trend: 7.8<--, 7.4<--, 8.5<--  Plt Trend: 287<--, 284<--, 374<--  01-14    138  |  95<L>  |  18  ----------------------------<  155<H>  3.8   |  27  |  3.32<H>    Ca    8.0<L>      14 Jan 2018 04:24  Phos  3.3     01-14  Mg     2.0     01-14    TPro  6.6  /  Alb  3.7  /  TBili  0.3  /  DBili  x   /  AST  122<H>  /  ALT  44  /  AlkPhos  168<H>  01-13    Creatinine Trend: 3.32<--, 2.91<--, 2.67<--, 7.07<--, 6.76<--, 6.80<--  PT/INR - ( 13 Jan 2018 06:15 )   PT: 12.3 sec;   INR: 1.13 ratio         PTT - ( 13 Jan 2018 06:15 )  PTT:27.4 sec    Arterial Blood Gas:  01-13 @ 05:57  7.34/36/75/19/94/-5.8  ABG lactate: --    Venous Blood Gas:  01-14 @ 08:14  7.37/56/40/32/66  VBG Lactate: 0.8  Venous Blood Gas:  01-14 @ 04:14  7.37/55/35/31/56  VBG Lactate: 1.4  Venous Blood Gas:  01-14 @ 00:16  7.38/55/40/32/66  VBG Lactate: 0.7  Venous Blood Gas:  01-13 @ 20:06  7.44/46/118/31/97  VBG Lactate: 0.6  Venous Blood Gas:  01-13 @ 16:24  7.34/55/46/29/73  VBG Lactate: 1.9  Venous Blood Gas:  01-13 @ 11:35  7.30/54/48/26/73  VBG Lactate: 2.0  Venous Blood Gas:  01-13 @ 08:44  7.27/54/41/24/58  VBG Lactate: 1.9  Venous Blood Gas:  01-13 @ 01:15  7.20/30/39/11/52  VBG Lactate: 4.7    CARDIAC MARKERS ( 13 Jan 2018 09:39 )  Trop 2.57 ng/mL<H> /  U/L<H> / CKMB x       CARDIAC MARKERS ( 13 Jan 2018 01:15 )  Trop 0.19 ng/mL<H> / CK x     / CKMB x             MICROBIOLOGY:   Blood Cx:  Urine Cx:  Sputum Cx:  Legionella:  RVP:    RADIOLOGY:  X Ray:  CT:  MRI:  Ultrasound:  [ ] Reviewed and interpreted by me    EKG:

## 2018-01-14 NOTE — DIETITIAN INITIAL EVALUATION ADULT. - PHYSICAL APPEARANCE
nutrition focused physical exam deferred at this time as pt not feeling well; visually no overt signs of muscle or fat loss/well nourished

## 2018-01-14 NOTE — DIETITIAN INITIAL EVALUATION ADULT. - ENERGY NEEDS
ht:5'3" , wt:116.6 pounds, BMI:20.6 kg/m2, IBW:115 pounds +/- 10%     As per team, pt "presented with generalized weakness, diffuse abdominal pain/N/V x 1 day, and recent flu-like symptoms and found to be in DKA" Noted anion gap now closed, plan to transition to insulin to SC; noted insulin pump off at this time as pt did not have supplies and given DKA-pt being followed by Endocrine.

## 2018-01-14 NOTE — DIETITIAN INITIAL EVALUATION ADULT. - FACTORS AFF FOOD INTAKE
pt reports recent onset of nausea and abdominal pain; no chewing/swallowing issues noted, noted pt is missing some teeth

## 2018-01-14 NOTE — PROGRESS NOTE ADULT - PROBLEM SELECTOR PLAN 1
-test BG AC/HS/2AM. IF BG dropping below 100mg/dl-will need more frequent monitoring of BG and IV dextrose to prevent hypoglycemia  -Start humalog low correction scale AC and Low HS scale for now  -Pt/team in agreement, will transition back to insulin pump in AM as pt too lethargic to manage at this time  -discussed plan w/pt and team  pager: [phone]

## 2018-01-14 NOTE — DIETITIAN INITIAL EVALUATION ADULT. - PT NOT SOURCE
only able to conduct partial interview as pt was not feeling well and then started to vomit- RN aware, pt to be given Zofran, team to be made aware; below is the information obtained from pt and previous RD evaluation

## 2018-01-14 NOTE — PROGRESS NOTE ADULT - ASSESSMENT
63 y/o F w/ uncontrolled Type 1 DM w/ ESRD on HD neuropathy, retinopathy, macrovascular complications of CAD on insulin pump at home with hyperglycemia now admitted with DKA. Pt given Lantus 15 to transition off gtt. Requiring 1 unit/hr on gtt. DKA resolved. At home pt requires around 9 units/day of basal insulin. Will need to be monitored for hypoglycemia given that pt has hypoglycemia unawareness. Would hold off on starting premeal insulin at this time as pt endorses having no appetite. Plan to transition back to pump in AM tomorrow. BG goal (140-200mg/dl).

## 2018-01-14 NOTE — PROGRESS NOTE ADULT - SUBJECTIVE AND OBJECTIVE BOX
Diabetes Follow up note:  Interval Hx:  62 year old female w/uncontrolled T1DM w/complications/comorbidities (managed on insulin pump at home) w/ESRD here w/DKA. Pt improved on insulin gtt overnight. Team transitioning to SQ insulin this AM. Lantus 15 given prior to visit. Pt sleepy at time of visit. Pt agrees to wait til tomorrow to restart pump given lethargy. Reports no appetite, diet restarted starting at lunch time. Pt nodding appropriately  to my questions but not verbalizing answers.     Review of Systems:  General: + fatigue  GI: Denies n/v/abd pain at time of visit.   CV: No CP/SOB  ENDO: No S&Sx of hypoglycemia  MEDS:  atorvastatin 20 milliGRAM(s) Oral at bedtime  insulin glargine Injectable (LANTUS) 15 Unit(s) SubCutaneous once  insulin Infusion 1 Unit(s)/Hr IV Continuous <Continuous>      Allergies    No Known Allergies        PE:  General: Female lying in bed. NAD.   Vital Signs Last 24 Hrs  T(C): 37.1 (14 Jan 2018 08:00), Max: 37.1 (14 Jan 2018 08:00)  T(F): 98.8 (14 Jan 2018 08:00), Max: 98.8 (14 Jan 2018 08:00)  HR: 71 (14 Jan 2018 10:00) (68 - 78)  BP: 151/74 (14 Jan 2018 10:00) (131/61 - 165/82)  BP(mean): 107 (14 Jan 2018 10:00) (88 - 117)  RR: 16 (14 Jan 2018 10:00) (12 - 18)  SpO2: 100% (14 Jan 2018 10:00) (89% - 100%)  Abd: Soft, NT,ND,   Extremities: Warm  Neuro: A&O X3    LABS:    POCT Blood Glucose.: 132 mg/dL (01-14-18 @ 11:08)  POCT Blood Glucose.: 126 mg/dL (01-14-18 @ 10:01)  POCT Blood Glucose.: 129 mg/dL (01-14-18 @ 09:32)  POCT Blood Glucose.: 136 mg/dL (01-14-18 @ 08:07)  POCT Blood Glucose.: 148 mg/dL (01-14-18 @ 06:59)  POCT Blood Glucose.: 148 mg/dL (01-14-18 @ 06:09)  POCT Blood Glucose.: 165 mg/dL (01-14-18 @ 05:03)  POCT Blood Glucose.: 155 mg/dL (01-14-18 @ 04:10)  POCT Blood Glucose.: 145 mg/dL (01-14-18 @ 03:04)  POCT Blood Glucose.: 138 mg/dL (01-14-18 @ 02:05)  POCT Blood Glucose.: 155 mg/dL (01-14-18 @ 01:04)  POCT Blood Glucose.: 146 mg/dL (01-13-18 @ 23:02)  POCT Blood Glucose.: 136 mg/dL (01-13-18 @ 22:06)  POCT Blood Glucose.: 129 mg/dL (01-13-18 @ 21:06)  POCT Blood Glucose.: 131 mg/dL (01-13-18 @ 20:03)  POCT Blood Glucose.: 162 mg/dL (01-13-18 @ 19:06)  POCT Blood Glucose.: 230 mg/dL (01-13-18 @ 18:06)  POCT Blood Glucose.: 374 mg/dL (01-13-18 @ 17:06)  POCT Blood Glucose.: 416 mg/dL (01-13-18 @ 16:12)  POCT Blood Glucose.: 488 mg/dL (01-13-18 @ 15:02)  POCT Blood Glucose.: 527 mg/dL (01-13-18 @ 14:06)  POCT Blood Glucose.: 586 mg/dL (01-13-18 @ 13:13)  POCT Blood Glucose.: 583 mg/dL (01-13-18 @ 13:12)  POCT Blood Glucose.: >600 mg/dL (01-13-18 @ 12:12)  POCT Blood Glucose.: >600 mg/dL (01-13-18 @ 12:11)  POCT Blood Glucose.: >600 mg/dL (01-13-18 @ 11:13)  POCT Blood Glucose.: >600 mg/dL (01-13-18 @ 11:12)  POCT Blood Glucose.: >600 mg/dL (01-13-18 @ 10:27)  POCT Blood Glucose.: >600 mg/dL (01-13-18 @ 10:26)  POCT Blood Glucose.: >600 mg/dL (01-13-18 @ 09:27)  POCT Blood Glucose.: >600 mg/dL (01-13-18 @ 09:26)  POCT Blood Glucose.: >600 mg/dL (01-13-18 @ 07:21)  POCT Blood Glucose.: >600 mg/dL (01-13-18 @ 07:10)  POCT Blood Glucose.: >600 mg/dL (01-13-18 @ 04:11)  POCT Blood Glucose.: >600 mg/dL (01-13-18 @ 03:28)  POCT Blood Glucose.: >600 mg/dL (01-13-18 @ 03:03)  POCT Blood Glucose.: >600 mg/dL (01-13-18 @ 02:13)  POCT Blood Glucose.: >600 mg/dL (01-13-18 @ 00:58)                            7.8    8.3   )-----------( 287      ( 14 Jan 2018 00:20 )             23.0       01-14    139  |  97  |  20  ----------------------------<  136<H>  3.7   |  31  |  3.74<H>    Ca    8.2<L>      14 Jan 2018 08:33  Phos  3.5     01-14  Mg     2.0     01-14    TPro  6.6  /  Alb  3.7  /  TBili  0.3  /  DBili  x   /  AST  122<H>  /  ALT  44  /  AlkPhos  168<H>  01-13      Thyroid Function Tests:  12-19 @ 06:12 TSH 1.07 FreeT4 -- T3 -- Anti TPO -- Anti Thyroglobulin Ab -- TSI --      Hemoglobin A1C, Whole Blood: 8.5 % <H> [4.0 - 5.6] (11-25-17 @ 04:12)            Contact number: isabel [phone] or [phone] Diabetes Follow up note:  Interval Hx:  62 year old female w/uncontrolled T1DM w/complications/comorbidities (managed on insulin pump at home) w/ESRD here w/DKA. Pt improved on insulin gtt overnight. Team transitioning to SQ insulin this AM. Lantus 15 given prior to visit. Pt sleepy at time of visit. Pt agrees to wait til tomorrow to restart pump given lethargy. Reports no appetite, diet restarted starting at lunch time. Pt nodding appropriately  to my questions but not verbalizing answers.     Review of Systems:  General: + fatigue  GI: Denies n/v/abd pain at time of visit.   CV: No CP/SOB  ENDO: No S&Sx of hypoglycemia  MEDS:  atorvastatin 20 milliGRAM(s) Oral at bedtime  insulin glargine Injectable (LANTUS) 15 Unit(s) SubCutaneous once  insulin Infusion 1 Unit(s)/Hr IV Continuous <Continuous>      Allergies    No Known Allergies        PE:  General: Female lying in bed. NAD.   Vital Signs Last 24 Hrs  T(C): 37.1 (14 Jan 2018 08:00), Max: 37.1 (14 Jan 2018 08:00)  T(F): 98.8 (14 Jan 2018 08:00), Max: 98.8 (14 Jan 2018 08:00)  HR: 71 (14 Jan 2018 10:00) (68 - 78)  BP: 151/74 (14 Jan 2018 10:00) (131/61 - 165/82)  BP(mean): 107 (14 Jan 2018 10:00) (88 - 117)  RR: 16 (14 Jan 2018 10:00) (12 - 18)  SpO2: 100% (14 Jan 2018 10:00) (89% - 100%)  CV: NSR on monitor  Abd: Soft, NT,ND,   Extremities: Warm  Neuro: Lethargic. nods appropriately to questions.     LABS:    POCT Blood Glucose.: 132 mg/dL (01-14-18 @ 11:08)  POCT Blood Glucose.: 126 mg/dL (01-14-18 @ 10:01)  POCT Blood Glucose.: 129 mg/dL (01-14-18 @ 09:32)  POCT Blood Glucose.: 136 mg/dL (01-14-18 @ 08:07)  POCT Blood Glucose.: 148 mg/dL (01-14-18 @ 06:59)  POCT Blood Glucose.: 148 mg/dL (01-14-18 @ 06:09)  POCT Blood Glucose.: 165 mg/dL (01-14-18 @ 05:03)  POCT Blood Glucose.: 155 mg/dL (01-14-18 @ 04:10)  POCT Blood Glucose.: 145 mg/dL (01-14-18 @ 03:04)  POCT Blood Glucose.: 138 mg/dL (01-14-18 @ 02:05)  POCT Blood Glucose.: 155 mg/dL (01-14-18 @ 01:04)  POCT Blood Glucose.: 146 mg/dL (01-13-18 @ 23:02)  POCT Blood Glucose.: 136 mg/dL (01-13-18 @ 22:06)  POCT Blood Glucose.: 129 mg/dL (01-13-18 @ 21:06)  POCT Blood Glucose.: 131 mg/dL (01-13-18 @ 20:03)  POCT Blood Glucose.: 162 mg/dL (01-13-18 @ 19:06)  POCT Blood Glucose.: 230 mg/dL (01-13-18 @ 18:06)  POCT Blood Glucose.: 374 mg/dL (01-13-18 @ 17:06)  POCT Blood Glucose.: 416 mg/dL (01-13-18 @ 16:12)  POCT Blood Glucose.: 488 mg/dL (01-13-18 @ 15:02)  POCT Blood Glucose.: 527 mg/dL (01-13-18 @ 14:06)  POCT Blood Glucose.: 586 mg/dL (01-13-18 @ 13:13)  POCT Blood Glucose.: 583 mg/dL (01-13-18 @ 13:12)  POCT Blood Glucose.: >600 mg/dL (01-13-18 @ 12:12)  POCT Blood Glucose.: >600 mg/dL (01-13-18 @ 12:11)  POCT Blood Glucose.: >600 mg/dL (01-13-18 @ 11:13)  POCT Blood Glucose.: >600 mg/dL (01-13-18 @ 11:12)  POCT Blood Glucose.: >600 mg/dL (01-13-18 @ 10:27)  POCT Blood Glucose.: >600 mg/dL (01-13-18 @ 10:26)  POCT Blood Glucose.: >600 mg/dL (01-13-18 @ 09:27)  POCT Blood Glucose.: >600 mg/dL (01-13-18 @ 09:26)  POCT Blood Glucose.: >600 mg/dL (01-13-18 @ 07:21)  POCT Blood Glucose.: >600 mg/dL (01-13-18 @ 07:10)  POCT Blood Glucose.: >600 mg/dL (01-13-18 @ 04:11)  POCT Blood Glucose.: >600 mg/dL (01-13-18 @ 03:28)  POCT Blood Glucose.: >600 mg/dL (01-13-18 @ 03:03)  POCT Blood Glucose.: >600 mg/dL (01-13-18 @ 02:13)  POCT Blood Glucose.: >600 mg/dL (01-13-18 @ 00:58)                            7.8    8.3   )-----------( 287      ( 14 Jan 2018 00:20 )             23.0       01-14    139  |  97  |  20  ----------------------------<  136<H>  3.7   |  31  |  3.74<H>    Ca    8.2<L>      14 Jan 2018 08:33  Phos  3.5     01-14  Mg     2.0     01-14    TPro  6.6  /  Alb  3.7  /  TBili  0.3  /  DBili  x   /  AST  122<H>  /  ALT  44  /  AlkPhos  168<H>  01-13      Thyroid Function Tests:  12-19 @ 06:12 TSH 1.07 FreeT4 -- T3 -- Anti TPO -- Anti Thyroglobulin Ab -- TSI --      Hemoglobin A1C, Whole Blood: 8.5 % <H> [4.0 - 5.6] (11-25-17 @ 04:12)            Contact number: isabel [phone] or [phone]

## 2018-01-14 NOTE — DIETITIAN INITIAL EVALUATION ADULT. - OTHER INFO
pt seen for consult for "dialysis." Noted wt of 116.6->120 pounds in house this admission thus far, noted per previous RD note from 12/19/2017, stable wt of about 120 pounds. Noted per previous RD note pt was trying to avoid high potassium, high phosphorus and high sodium foods as well and declined any further diet education at that time. No food allergies noted per chart. Noted pt was taking a phosphate binder at home.

## 2018-01-14 NOTE — DIETITIAN INITIAL EVALUATION ADULT. - ADHERENCE
noted A1C 11/25: 8.5%; noted per Endocrine note from this admission, pt had reported usually Finger sticks are 150s-190s, pt reported to RD Finger sticks are around the 100s when her intake is good

## 2018-01-14 NOTE — DIETITIAN INITIAL EVALUATION ADULT. - SIGNS/SYMPTOMS
pt c A1C 8.5%, multiple admissions for DKA pt c reports of nausea, vomiting at this time, unable to tolerate po

## 2018-01-14 NOTE — CONSULT NOTE ADULT - SUBJECTIVE AND OBJECTIVE BOX
HPI:  63 y/o F with a PMHx significant for T1DM (on insulin pump, with multiple admissions for DKA), HTN, HLD, former smoker, MI, CAD s/p PCI to RCA and brachytherapy in Aug 2017, dCHF (last TTE in Nov 2017 mild-mod MR, mild AS, mild-mod TR, EF 40-45%), chronic LLE pain and edema in setting of severe PVD s/p L & R fem-pop bypass graft with fem-pop bypass revisions, left subclavian vein stenosis s/p stent, ESRD on HD (Mon/Tu/Thr/Sat) via L AVF with oliguria, CVA with memory deficit, and depression who presents to the ED w/ a cc of n/v and diffuse abdominal pain of one days duration. Pt also endorsing flu-like symptoms 1 week prior to admission. Pt was found to be in DKA with VBG pH 7.2, HCO3 11, AG 43, BMP glucose 1049, and B-hydroxy butyrate >8. Pt also found to have serum K of 6.9, not hemolyzed, with peaked T waves in V2-V4. Pt was bolused 3L NS and given 2g calcium gluconate, albuterol x2, insulin 6 units x1, sodium bicarb 50meq x1, and subsequently started on an insulin gtt @1u/h. Pt was admitted to MICU for management of DKA. (13 Jan 2018 04:03)      PAST MEDICAL & SURGICAL HISTORY:  Subclavian vein stenosis, left: s/p stent  Cellulitis: 2015 left foot  DKA, type 1: 1/2015  ACS (acute coronary syndrome): 1/2015 - cath revealed 100% ostial stenosis not amenable to PCI - medical management  MI, old  TIA (transient ischemic attack): x 2 - 8-9 years ago prior to ASD/VSD repair  CAD (coronary artery disease): s/p stents  Murmur, cardiac  Gout: past  CVA (cerebral infarction): with no residual, 8 yrs ago, prior to heart surgery - ST memory loss  Peripheral vascular disease: occluded left fem-pop bypass 5/2015  Diabetes mellitus type 1: Insulin Dependent - [employer]  Minimed Paradigm Insulin Pump - Novolog  ESRD (end stage renal disease): dialysis , tue, thursday, saturday  Hyperlipidemia  Status post device closure of ASD: &quot;clamshell&quot;  History of cardiac catheterization: 1/2015 - no intervention  S/P femoral-popliteal bypass surgery: L and R in 2013 with graft; 5/2015 CFA angioplasty left and ileofemoral endarterectomywith vein patch angioplasty of left fem-pop bypass graft  Multiple vascular surgery both leg, left fempop bypass revision 11/2015  AV (arteriovenous fistula): Left AV graft; revision with stent placement 2-3 years ago  S/P cholecystectomy      Review of Systems:   CONSTITUTIONAL: No fever, weight loss, or fatigue  EYES: No eye pain, visual disturbances, or discharge  ENMT:  No difficulty hearing, tinnitus, vertigo; No sinus or throat pain  NECK: No pain or stiffness  BREASTS: No pain, masses, or nipple discharge  RESPIRATORY: No cough, wheezing, chills or hemoptysis; No shortness of breath  CARDIOVASCULAR: No chest pain, palpitations, dizziness, or leg swelling  GASTROINTESTINAL: + abdominal pain. No nausea, vomiting, or hematemesis; No diarrhea or constipation. No melena or hematochezia.  GENITOURINARY: No dysuria, frequency, hematuria, or incontinence  NEUROLOGICAL: No headaches, memory loss, loss of strength, numbness, or tremors  SKIN: No itching, burning, rashes, or lesions   LYMPH NODES: No enlarged glands  ENDOCRINE: No heat or cold intolerance; No hair loss  MUSCULOSKELETAL: No joint pain or swelling; No muscle, back, or extremity pain  PSYCHIATRIC: No depression, anxiety, mood swings, or difficulty sleeping  HEME/LYMPH: No easy bruising, or bleeding gums  ALLERY AND IMMUNOLOGIC: No hives or eczema    Allergies    No Known Allergies    Intolerances        Social History:     FAMILY HISTORY:  Family history of smoking  Family history of hypertension  Family history of cancer (Sibling)      MEDICATIONS  (STANDING):  aspirin enteric coated 81 milliGRAM(s) Oral daily  atorvastatin 20 milliGRAM(s) Oral at bedtime  cinacalcet 60 milliGRAM(s) Oral daily  clopidogrel Tablet 75 milliGRAM(s) Oral at bedtime  dextrose 5%. 1000 milliLiter(s) (50 mL/Hr) IV Continuous <Continuous>  dextrose 50% Injectable 12.5 Gram(s) IV Push once  dextrose 50% Injectable 25 Gram(s) IV Push once  dextrose 50% Injectable 25 Gram(s) IV Push once  DULoxetine 60 milliGRAM(s) Oral daily  heparin  Injectable 5000 Unit(s) SubCutaneous every 8 hours  hydrALAZINE 50 milliGRAM(s) Oral every 8 hours  insulin Infusion 1 Unit(s)/Hr (1 mL/Hr) IV Continuous <Continuous>  insulin lispro (HumaLOG) corrective regimen sliding scale   SubCutaneous three times a day before meals  lisinopril 40 milliGRAM(s) Oral daily  metoprolol     tartrate 25 milliGRAM(s) Oral two times a day    MEDICATIONS  (PRN):  dextrose Gel 1 Dose(s) Oral once PRN Blood Glucose LESS THAN 70 milliGRAM(s)/deciLiter  glucagon  Injectable 1 milliGRAM(s) IntraMuscular once PRN Glucose <70 milliGRAM(s)/deciLiter  oxyCODONE    5 mG/acetaminophen 325 mG 1 Tablet(s) Oral every 6 hours PRN Moderate to Severe Pain        CAPILLARY BLOOD GLUCOSE      POCT Blood Glucose.: 124 mg/dL (14 Jan 2018 18:15)  POCT Blood Glucose.: 114 mg/dL (14 Jan 2018 16:25)  POCT Blood Glucose.: 108 mg/dL (14 Jan 2018 15:01)  POCT Blood Glucose.: 103 mg/dL (14 Jan 2018 14:20)  POCT Blood Glucose.: 99 mg/dL (14 Jan 2018 13:06)  POCT Blood Glucose.: 117 mg/dL (14 Jan 2018 12:11)  POCT Blood Glucose.: 132 mg/dL (14 Jan 2018 11:08)  POCT Blood Glucose.: 126 mg/dL (14 Jan 2018 10:01)  POCT Blood Glucose.: 129 mg/dL (14 Jan 2018 09:32)  POCT Blood Glucose.: 136 mg/dL (14 Jan 2018 08:07)  POCT Blood Glucose.: 148 mg/dL (14 Jan 2018 06:59)  POCT Blood Glucose.: 148 mg/dL (14 Jan 2018 06:09)  POCT Blood Glucose.: 165 mg/dL (14 Jan 2018 05:03)  POCT Blood Glucose.: 155 mg/dL (14 Jan 2018 04:10)  POCT Blood Glucose.: 145 mg/dL (14 Jan 2018 03:04)  POCT Blood Glucose.: 138 mg/dL (14 Jan 2018 02:05)  POCT Blood Glucose.: 155 mg/dL (14 Jan 2018 01:04)  POCT Blood Glucose.: 146 mg/dL (13 Jan 2018 23:02)  POCT Blood Glucose.: 136 mg/dL (13 Jan 2018 22:06)  POCT Blood Glucose.: 129 mg/dL (13 Jan 2018 21:06)  POCT Blood Glucose.: 131 mg/dL (13 Jan 2018 20:03)  POCT Blood Glucose.: 162 mg/dL (13 Jan 2018 19:06)    I&O's Summary    13 Jan 2018 07:01  -  14 Jan 2018 07:00  --------------------------------------------------------  IN: 617 mL / OUT: 0 mL / NET: 617 mL    14 Jan 2018 07:01  -  14 Jan 2018 18:18  --------------------------------------------------------  IN: 246 mL / OUT: 0 mL / NET: 246 mL        PHYSICAL EXAM:  GENERAL: NAD, well-developed  HEAD:  Atraumatic, Normocephalic  EYES: EOMI, PERRLA, conjunctiva and sclera clear  NECK: Supple, No JVD  CHEST/LUNG: Clear to auscultation bilaterally; No wheeze  HEART: Regular rate and rhythm; No murmurs, rubs, or gallops  ABDOMEN: Soft, Nontender, Nondistended; Bowel sounds present  EXTREMITIES:  2+ Peripheral Pulses, No clubbing, cyanosis, or edema  PSYCH: AAOx3  NEUROLOGY: non-focal  SKIN: No rashes or lesions    LABS:                        7.8    8.3   )-----------( 287      ( 14 Jan 2018 00:20 )             23.0     01-14    139  |  96  |  21  ----------------------------<  110<H>  3.9   |  29  |  4.08<H>    Ca    8.1<L>      14 Jan 2018 14:48  Phos  3.8     01-14  Mg     2.1     01-14    TPro  6.6  /  Alb  3.7  /  TBili  0.3  /  DBili  x   /  AST  122<H>  /  ALT  44  /  AlkPhos  168<H>  01-13    PT/INR - ( 13 Jan 2018 06:15 )   PT: 12.3 sec;   INR: 1.13 ratio         PTT - ( 13 Jan 2018 06:15 )  PTT:27.4 sec  CARDIAC MARKERS ( 14 Jan 2018 16:44 )  x     / 2.88 ng/mL / 190 U/L / x     / 7.9 ng/mL  CARDIAC MARKERS ( 14 Jan 2018 08:33 )  x     / 3.19 ng/mL / 229 U/L / x     / 12.8 ng/mL  CARDIAC MARKERS ( 13 Jan 2018 09:39 )  x     / 2.57 ng/mL / 433 U/L / x     / 36.0 ng/mL  CARDIAC MARKERS ( 13 Jan 2018 01:15 )  x     / 0.19 ng/mL / x     / x     / x              RADIOLOGY & ADDITIONAL TESTS:    Imaging Personally Reviewed:    Consultant(s) Notes Reviewed:      Care Discussed with Consultants/Other Providers:

## 2018-01-14 NOTE — PROGRESS NOTE ADULT - SUBJECTIVE AND OBJECTIVE BOX
[facility]   [phone]  Dr. Urban (covering for Dr. Burger)  RENAL FOLLOW UP NOTE  --------------------------------------------------------------------------------  Patient seen and examined.  s/p HD yesterday.  Denies chest pain / palpitations / SOB.    PAST HISTORY  --------------------------------------------------------------------------------  No significant changes to PMH, PSH, FHx, SHx, unless otherwise noted    ALLERGIES & MEDICATIONS  --------------------------------------------------------------------------------  Allergies    No Known Allergies    Intolerances      Standing Inpatient Medications  aspirin enteric coated 81 milliGRAM(s) Oral daily  atorvastatin 20 milliGRAM(s) Oral at bedtime  cinacalcet 60 milliGRAM(s) Oral daily  clopidogrel Tablet 75 milliGRAM(s) Oral at bedtime  DULoxetine 60 milliGRAM(s) Oral daily  heparin  Injectable 5000 Unit(s) SubCutaneous every 8 hours  hydrALAZINE 50 milliGRAM(s) Oral every 8 hours  insulin Infusion 1 Unit(s)/Hr IV Continuous <Continuous>  lisinopril 40 milliGRAM(s) Oral daily  metoprolol     tartrate 25 milliGRAM(s) Oral two times a day    PRN Inpatient Medications  oxyCODONE    5 mG/acetaminophen 325 mG 1 Tablet(s) Oral every 6 hours PRN      FOCUSED REVIEW OF SYSTEMS  --------------------------------------------------------------------------------  denies fevers/rigors  Denies CP/sob      VITALS/PHYSICAL EXAM  --------------------------------------------------------------------------------  T(C): 37.1 (01-14-18 @ 08:00), Max: 37.1 (01-14-18 @ 08:00)  HR: 75 (01-14-18 @ 09:00) (68 - 78)  BP: 143/68 (01-14-18 @ 09:00) (120/55 - 165/82)  RR: 17 (01-14-18 @ 09:00) (12 - 18)  SpO2: 98% (01-14-18 @ 09:00) (89% - 100%)  Wt(kg): --  Height (cm): 160.02 (01-13-18 @ 04:00)  Weight (kg): 52.9 (01-13-18 @ 04:00)  BMI (kg/m2): 20.7 (01-13-18 @ 04:00)  BSA (m2): 1.54 (01-13-18 @ 04:00)      01-13-18 @ 07:01  -  01-14-18 @ 07:00  --------------------------------------------------------  IN: 617 mL / OUT: 0 mL / NET: 617 mL    01-14-18 @ 07:01  -  01-14-18 @ 09:41  --------------------------------------------------------  IN: 2 mL / OUT: 0 mL / NET: 2 mL      Physical Exam:  	Gen: NAD, well-appearing, OOB to chair  	Pulm: CTA B/L  	CV: RRR, S1S2  	Abd: +BS, soft, nontender/nondistended  	: No suprapubic tenderness.  no irene          Extremity: No cyanosis, no edema  	Neuro: A&O x 3              Access: ANA RAMOS + thrill      LABS/STUDIES  --------------------------------------------------------------------------------              7.8    8.3   >-----------<  287      [01-14-18 @ 00:20]              23.0     139  |  97  |  20  ----------------------------<  136      [01-14-18 @ 08:33]  3.7   |  31  |  3.74        Ca     8.2     [01-14-18 @ 08:33]      Mg     2.0     [01-14-18 @ 08:33]      Phos  3.5     [01-14-18 @ 08:33]    TPro  6.6  /  Alb  3.7  /  TBili  0.3  /  DBili  x   /  AST  122  /  ALT  44  /  AlkPhos  168  [01-13-18 @ 06:15]    PT/INR: PT 12.3 , INR 1.13       [01-13-18 @ 06:15]  PTT: 27.4       [01-13-18 @ 06:15]    Troponin 3.19      [01-14-18 @ 08:33]        [01-14-18 @ 08:33]    eGFR if Non African American: 12 mL/min/1.73M2 (01-14 @ 08:33)  eGFR if : 14 mL/min/1.73M2 (01-14 @ 08:33)  eGFR if Non African American: 14 mL/min/1.73M2 (01-14 @ 04:24)  eGFR if : 17 mL/min/1.73M2 (01-14 @ 04:24)  eGFR if Non African American: 17 mL/min/1.73M2 (01-14 @ 00:20)  eGFR if : 19 mL/min/1.73M2 (01-14 @ 00:20)  eGFR if Non African American: 19 mL/min/1.73M2 (01-13 @ 20:14)  eGFR if African American: 22 mL/min/1.73M2 (01-13 @ 20:14)  eGFR if Non African American: 6 mL/min/1.73M2 (01-13 @ 16:27)  eGFR if : 7 mL/min/1.73M2 (01-13 @ 16:27)  eGFR if Non African American: 6 mL/min/1.73M2 (01-13 @ 12:30)  eGFR if : 7 mL/min/1.73M2 (01-13 @ 12:30)  eGFR if Non African American: 6 mL/min/1.73M2 (01-13 @ 10:42)  eGFR if : 7 mL/min/1.73M2 (01-13 @ 10:42)    Creatinine Trend:  SCr 3.74 [01-14 @ 08:33]  SCr 3.32 [01-14 @ 04:24]  SCr 2.91 [01-14 @ 00:20]  SCr 2.67 [01-13 @ 20:14]  SCr 7.07 [01-13 @ 16:27]                  HbA1c 8.5      [11-25-17 @ 04:12]  TSH 1.07      [12-19-17 @ 06:12]

## 2018-01-14 NOTE — PROGRESS NOTE ADULT - PROBLEM SELECTOR PLAN 1
s/p HD yesterday  Hyperkalemia resolved  Will reassess for next treatment tomorrow morning  Dose meds for eGFR<15  Continue sensipar

## 2018-01-14 NOTE — CHART NOTE - NSCHARTNOTEFT_GEN_A_CORE
MICU Transfer Note    Transfer from: MICU    Transfer to: ( X ) Medicine    (  ) Telemetry     (   ) RCU        (    ) Palliative         (   ) Stroke Unit          (   ) __________________    Accepting Physician: Dr. Viera  Signout given to:     MICU COURSE:  62F w/ DM1 (on insulin pump, with multiple admissions for DKA), HTN, HLD, former smoker, MI, CAD s/p PCI to RCA and brachytherapy in Aug 2017, dCHF (last TTE in Nov 2017 mild-mod MR, mild AS, mild-mod TR, EF 40-45%), chronic LLE pain and edema in setting of severe PVD s/p L & R fem-pop bypass graft with fem-pop bypass revisions, left subclavian vein stenosis s/p stent, ESRD on HD (Mon/Tu/Thr/Sat) via L AVF with oliguria, CVA with memory deficit, and depression presented with generalized weakness, diffuse abdominal pain/N/V x 1 day, and recent flu-like symptoms and found to be in DKA treated w/ insulin gtt and transitioned to subcutaneous insulin. Lantus 15U dosed and end conuslted and recommended only low dose ISS for initial 24hr and then insulin pump would be initiated per endo.        ASSESSMENT & PLAN:   62F w/ DM1 (on insulin pump, with multiple admissions for DKA), HTN, HLD, former smoker, MI, CAD s/p PCI to RCA and brachytherapy in Aug 2017, dCHF (last TTE in Nov 2017 mild-mod MR, mild AS, mild-mod TR, EF 40-45%), chronic LLE pain and edema in setting of severe PVD s/p L & R fem-pop bypass graft with fem-pop bypass revisions, left subclavian vein stenosis s/p stent, ESRD on HD (Mon/Tu/Thr/Sat) via L AVF with oliguria, CVA with memory deficit, and depression presented with generalized weakness, diffuse abdominal pain/N/V x 1 day, and recent flu-like symptoms and found to be in DKA now transitioned to subcutaneous insulin    #DKA likely 2/2 stress from viral gastroenteritis  - Endo following and patient transitioned off insulin gtt. F/u w/ endo regarding initiating insulin pump. Monitor BMP for sustained resolution of metabolic acidosis (note will have baseline elevation from ESRD)  #ESRD on HD  - Per nephrology for HD. typically M/W/F/Sa  #Difficult peripheral IV access  - continues to require Right IJ triple lumen and if patient needs continue access should be evaluated for PICC line.  #Abdominal Pain  - resolving and likely 2/2 DKA w/ viral gastroenteritis. Should be monitored clinically and if continues should has abdominal US evaluation    FOR FOLLOW UP:  Jose Hodgson MD PGY-2  MICU Resident  [id number] MICU Transfer Note    Transfer from: MICU    Transfer to: ( X ) Medicine    (  ) Telemetry     (   ) RCU        (    ) Palliative         (   ) Stroke Unit          (   ) __________________    Accepting Physician: Dr. Viera  Signout given to: Trudy SMITH @[address] [place], [place] [address]U COURSE:  62F w/ DM1 (on insulin pump, with multiple admissions for DKA), HTN, HLD, former smoker, MI, CAD s/p PCI to RCA and brachytherapy in Aug 2017, dCHF (last TTE in Nov 2017 mild-mod MR, mild AS, mild-mod TR, EF 40-45%), chronic LLE pain and edema in setting of severe PVD s/p L & R fem-pop bypass graft with fem-pop bypass revisions, left subclavian vein stenosis s/p stent, ESRD on HD (Mon/Tu/Thr/Sat) via L AVF with oliguria, CVA with memory deficit, and depression presented with generalized weakness, diffuse abdominal pain/N/V x 1 day, and recent flu-like symptoms and found to be in DKA treated w/ insulin gtt and transitioned to subcutaneous insulin. Lantus 15U dosed and end conuslted and recommended only low dose ISS for initial 24hr and then insulin pump would be initiated per endo.        ASSESSMENT & PLAN:   62F w/ DM1 (on insulin pump, with multiple admissions for DKA), HTN, HLD, former smoker, MI, CAD s/p PCI to RCA and brachytherapy in Aug 2017, dCHF (last TTE in Nov 2017 mild-mod MR, mild AS, mild-mod TR, EF 40-45%), chronic LLE pain and edema in setting of severe PVD s/p L & R fem-pop bypass graft with fem-pop bypass revisions, left subclavian vein stenosis s/p stent, ESRD on HD (Mon/Tu/Thr/Sat) via L AVF with oliguria, CVA with memory deficit, and depression presented with generalized weakness, diffuse abdominal pain/N/V x 1 day, and recent flu-like symptoms and found to be in DKA now transitioned to subcutaneous insulin    #DKA likely 2/2 stress from viral gastroenteritis  - Endo on board. Endo re-initiating insulin pump. Pt managing her pump safely. Monitor FS. Diabetic renal DASH diet.  BMP with resolution of metabolic acidosis however, (note will have baseline elevation from ESRD)  #ESRD on HD  - Per nephrology for HD. typically M/W/F/Sa  #Difficult peripheral IV access  - continues to require Right IJ triple lumen and if patient needs continue access should be evaluated for PICC line.  #Abdominal Pain  - resolving and likely 2/2 DKA w/ viral gastroenteritis. Should be monitored clinically and if continues should has abdominal US evaluation        MICU Team   [id number] MICU Transfer Note    Transfer from: MICU    Transfer to: ( X ) Medicine    (  ) Telemetry     (   ) RCU        (    ) Palliative         (   ) Stroke Unit          (   ) __________________    Accepting Physician: Dr. Viera  Signout given to: Trudy SMITH @[address] [place], [place] [address]U COURSE:  62F w/ DM1 (on insulin pump, with multiple admissions for DKA), HTN, HLD, former smoker, MI, CAD s/p PCI to RCA and brachytherapy in Aug 2017, dCHF (last TTE in Nov 2017 mild-mod MR, mild AS, mild-mod TR, EF 40-45%), chronic LLE pain and edema in setting of severe PVD s/p L & R fem-pop bypass graft with fem-pop bypass revisions, left subclavian vein stenosis s/p stent, ESRD on HD (Mon/Tu/Thr/Sat) via L AVF with oliguria, CVA with memory deficit, and depression presented with generalized weakness, diffuse abdominal pain/N/V x 1 day, and recent flu-like symptoms and found to be in DKA treated w/ insulin gtt and transitioned to subcutaneous insulin. Lantus 15U dosed and end conuslted and recommended only low dose ISS for initial 24hr and then insulin pump would be initiated per endo.        ASSESSMENT & PLAN:   62F w/ DM1 (on insulin pump, with multiple admissions for DKA), HTN, HLD, former smoker, MI, CAD s/p PCI to RCA and brachytherapy in Aug 2017, dCHF (last TTE in Nov 2017 mild-mod MR, mild AS, mild-mod TR, EF 40-45%), chronic LLE pain and edema in setting of severe PVD s/p L & R fem-pop bypass graft with fem-pop bypass revisions, left subclavian vein stenosis s/p stent, ESRD on HD (Mon/Tu/Thr/Sat) via L AVF with oliguria, CVA with memory deficit, and depression presented with generalized weakness, diffuse abdominal pain/N/V x 1 day, and recent flu-like symptoms and found to be in DKA now transitioned to subcutaneous insulin    #DKA likely 2/2 stress from viral gastroenteritis  - Endo on board. Endo re-initiating insulin pump. Pt managing her pump safely. Monitor FS. Diabetic renal DASH diet.  BMP with resolution of metabolic acidosis however, (note will have baseline elevation from ESRD)  #ESRD on HD  - Per nephrology for HD. typically M/W/F/Sa  #Difficult peripheral IV access  - continues to require Right IJ triple lumen and if patient needs continue access should be evaluated for PICC line.  #Abdominal Pain  - resolving and likely 2/2 DKA w/ viral gastroenteritis. Should be monitored clinically and if continues should has abdominal US evaluation  - given long hx of DM and distended stomach on admission CT would also consider undiagnosed gastroparesis; consider workup on medical floor vs outpt GI eval        MICU Team   [id number]

## 2018-01-14 NOTE — PROGRESS NOTE ADULT - ASSESSMENT
62F w/ DM1 (on insulin pump, with multiple admissions for DKA), HTN, HLD, former smoker, MI, CAD s/p PCI to RCA and brachytherapy in Aug 2017, dCHF (last TTE in Nov 2017 mild-mod MR, mild AS, mild-mod TR, EF 40-45%), chronic LLE pain and edema in setting of severe PVD s/p L & R fem-pop bypass graft with fem-pop bypass revisions, left subclavian vein stenosis s/p stent, ESRD on HD (Mon/Tu/Thr/Sat) via L AVF with oliguria, CVA with memory deficit, and depression presented with generalized weakness, diffuse abdominal pain/N/V x 1 day, and recent flu-like symptoms and found to be in DKA     PLAN:  #Neuro  -- At baseline AAOx 3, no issues. C/w atorvastatin for h/o CVA.   -- C/w Cymbalta for depression.    #Pulmonary      #Cardiac  -- C/w home lisinopril, hydralazine for HTN  -- C/w metoprolol 25mg BID  -- C/w home ASA/ plavix for CAD    #Endo      #Heme  -- Anemia of chronic disease in setting of ESRD, at baseline.     #ID      #Renal    #FEN/GI      #DVT ppx  -- HSQ    #GOC  -- Full code 62F w/ DM1 (on insulin pump, with multiple admissions for DKA), HTN, HLD, former smoker, MI, CAD s/p PCI to RCA and brachytherapy in Aug 2017, dCHF (last TTE in Nov 2017 mild-mod MR, mild AS, mild-mod TR, EF 40-45%), chronic LLE pain and edema in setting of severe PVD s/p L & R fem-pop bypass graft with fem-pop bypass revisions, left subclavian vein stenosis s/p stent, ESRD on HD (Mon/Tu/Thr/Sat) via L AVF with oliguria, CVA with memory deficit, and depression presented with generalized weakness, diffuse abdominal pain/N/V x 1 day, and recent flu-like symptoms and found to be in DKA     PLAN:  #Neuro  -- At baseline AAOx 3, no issues. C/w atorvastatin for h/o CVA.   -- C/w Cymbalta for depression.    #Pulmonary  - initial concern for cap but on bedside us there was no concerning signs of infiltration and therefore monitored off abx    #Cardiac  -- C/w home lisinopril, hydralazine for HTN  -- C/w metoprolol 25mg BID  -- C/w home ASA/ plavix for CAD    #Endo  -DKA. gap closed and bridge to lantus sq. per endo will only require low correction scale insulin for 24hr and then endo will restart insulin pump    #Heme  -- Anemia of chronic disease in setting of ESRD, at baseline.     #ID  - monitoring off abx. likely had viral gastroenteritis prior to admission which symptomatically resolved    #Renal  -ESRD on HD: per nephrology for HD. typically M/W/F/Sa. Left upper arm av fistula    #GI  -abdominal pain likely 2/2 resolved DKA/possible viral gastroenteritis? patient reports sick contacts    #PPX  -- HSQ for dvt ppx  -- Right IJ triple lumen because unable to place peripheral or US-guided IV. will transfer to medicine w/ access and if patient requires longer duration access may need PICC    #GOC  -- Full code

## 2018-01-15 LAB
ALBUMIN SERPL ELPH-MCNC: 3.2 G/DL — LOW (ref 3.3–5)
ALP SERPL-CCNC: 152 U/L — HIGH (ref 40–120)
ALT FLD-CCNC: 58 U/L RC — HIGH (ref 10–45)
ANION GAP SERPL CALC-SCNC: 12 MMOL/L — SIGNIFICANT CHANGE UP (ref 5–17)
AST SERPL-CCNC: 82 U/L — HIGH (ref 10–40)
BASOPHILS # BLD AUTO: 0 K/UL — SIGNIFICANT CHANGE UP (ref 0–0.2)
BASOPHILS NFR BLD AUTO: 0.2 % — SIGNIFICANT CHANGE UP (ref 0–2)
BILIRUB SERPL-MCNC: 0.2 MG/DL — SIGNIFICANT CHANGE UP (ref 0.2–1.2)
BUN SERPL-MCNC: 26 MG/DL — HIGH (ref 7–23)
CALCIUM SERPL-MCNC: 7.1 MG/DL — LOW (ref 8.4–10.5)
CHLORIDE SERPL-SCNC: 97 MMOL/L — SIGNIFICANT CHANGE UP (ref 96–108)
CO2 SERPL-SCNC: 29 MMOL/L — SIGNIFICANT CHANGE UP (ref 22–31)
CREAT SERPL-MCNC: 4.64 MG/DL — HIGH (ref 0.5–1.3)
EOSINOPHIL # BLD AUTO: 0.1 K/UL — SIGNIFICANT CHANGE UP (ref 0–0.5)
EOSINOPHIL NFR BLD AUTO: 1.2 % — SIGNIFICANT CHANGE UP (ref 0–6)
GAS PNL BLDV: SIGNIFICANT CHANGE UP
GLUCOSE BLDC GLUCOMTR-MCNC: 136 MG/DL — HIGH (ref 70–99)
GLUCOSE BLDC GLUCOMTR-MCNC: 166 MG/DL — HIGH (ref 70–99)
GLUCOSE BLDC GLUCOMTR-MCNC: 168 MG/DL — HIGH (ref 70–99)
GLUCOSE BLDC GLUCOMTR-MCNC: 21 MG/DL — CRITICAL LOW (ref 70–99)
GLUCOSE BLDC GLUCOMTR-MCNC: 215 MG/DL — HIGH (ref 70–99)
GLUCOSE BLDC GLUCOMTR-MCNC: 23 MG/DL — CRITICAL LOW (ref 70–99)
GLUCOSE BLDC GLUCOMTR-MCNC: 29 MG/DL — CRITICAL LOW (ref 70–99)
GLUCOSE BLDC GLUCOMTR-MCNC: 58 MG/DL — LOW (ref 70–99)
GLUCOSE BLDC GLUCOMTR-MCNC: 65 MG/DL — LOW (ref 70–99)
GLUCOSE BLDC GLUCOMTR-MCNC: 66 MG/DL — LOW (ref 70–99)
GLUCOSE BLDC GLUCOMTR-MCNC: 80 MG/DL — SIGNIFICANT CHANGE UP (ref 70–99)
GLUCOSE BLDC GLUCOMTR-MCNC: 91 MG/DL — SIGNIFICANT CHANGE UP (ref 70–99)
GLUCOSE BLDC GLUCOMTR-MCNC: 95 MG/DL — SIGNIFICANT CHANGE UP (ref 70–99)
GLUCOSE SERPL-MCNC: 153 MG/DL — HIGH (ref 70–99)
HCT VFR BLD CALC: 22.8 % — LOW (ref 34.5–45)
HGB BLD-MCNC: 7.8 G/DL — LOW (ref 11.5–15.5)
LYMPHOCYTES # BLD AUTO: 1 K/UL — SIGNIFICANT CHANGE UP (ref 1–3.3)
LYMPHOCYTES # BLD AUTO: 16 % — SIGNIFICANT CHANGE UP (ref 13–44)
MAGNESIUM SERPL-MCNC: 2 MG/DL — SIGNIFICANT CHANGE UP (ref 1.6–2.6)
MCHC RBC-ENTMCNC: 34.4 GM/DL — SIGNIFICANT CHANGE UP (ref 32–36)
MCHC RBC-ENTMCNC: 35.2 PG — HIGH (ref 27–34)
MCV RBC AUTO: 102 FL — HIGH (ref 80–100)
MONOCYTES # BLD AUTO: 0.5 K/UL — SIGNIFICANT CHANGE UP (ref 0–0.9)
MONOCYTES NFR BLD AUTO: 8.5 % — SIGNIFICANT CHANGE UP (ref 2–14)
NEUTROPHILS # BLD AUTO: 4.7 K/UL — SIGNIFICANT CHANGE UP (ref 1.8–7.4)
NEUTROPHILS NFR BLD AUTO: 74.1 % — SIGNIFICANT CHANGE UP (ref 43–77)
PHOSPHATE SERPL-MCNC: 3.9 MG/DL — SIGNIFICANT CHANGE UP (ref 2.5–4.5)
PLATELET # BLD AUTO: 271 K/UL — SIGNIFICANT CHANGE UP (ref 150–400)
POTASSIUM SERPL-MCNC: 4 MMOL/L — SIGNIFICANT CHANGE UP (ref 3.5–5.3)
POTASSIUM SERPL-SCNC: 4 MMOL/L — SIGNIFICANT CHANGE UP (ref 3.5–5.3)
PROT SERPL-MCNC: 6.1 G/DL — SIGNIFICANT CHANGE UP (ref 6–8.3)
RBC # BLD: 2.23 M/UL — LOW (ref 3.8–5.2)
RBC # FLD: 12.1 % — SIGNIFICANT CHANGE UP (ref 10.3–14.5)
SODIUM SERPL-SCNC: 138 MMOL/L — SIGNIFICANT CHANGE UP (ref 135–145)
WBC # BLD: 6.3 K/UL — SIGNIFICANT CHANGE UP (ref 3.8–10.5)
WBC # FLD AUTO: 6.3 K/UL — SIGNIFICANT CHANGE UP (ref 3.8–10.5)

## 2018-01-15 PROCEDURE — 99233 SBSQ HOSP IP/OBS HIGH 50: CPT

## 2018-01-15 RX ORDER — INSULIN LISPRO 100/ML
1 VIAL (ML) SUBCUTANEOUS
Qty: 0 | Refills: 0 | Status: DISCONTINUED | OUTPATIENT
Start: 2018-01-15 | End: 2018-01-15

## 2018-01-15 RX ORDER — DEXTROSE 50 % IN WATER 50 %
12.5 SYRINGE (ML) INTRAVENOUS ONCE
Qty: 0 | Refills: 0 | Status: COMPLETED | OUTPATIENT
Start: 2018-01-15 | End: 2018-01-15

## 2018-01-15 RX ORDER — INSULIN LISPRO 100/ML
1 VIAL (ML) SUBCUTANEOUS
Qty: 0 | Refills: 0 | Status: DISCONTINUED | OUTPATIENT
Start: 2018-01-15 | End: 2018-01-17

## 2018-01-15 RX ORDER — DULOXETINE HYDROCHLORIDE 30 MG/1
60 CAPSULE, DELAYED RELEASE ORAL EVERY OTHER DAY
Qty: 0 | Refills: 0 | Status: DISCONTINUED | OUTPATIENT
Start: 2018-01-16 | End: 2018-01-17

## 2018-01-15 RX ORDER — HUMAN INSULIN 100 [IU]/ML
5 INJECTION, SUSPENSION SUBCUTANEOUS ONCE
Qty: 0 | Refills: 0 | Status: COMPLETED | OUTPATIENT
Start: 2018-01-15 | End: 2018-01-15

## 2018-01-15 RX ORDER — DEXTROSE 50 % IN WATER 50 %
25 SYRINGE (ML) INTRAVENOUS ONCE
Qty: 0 | Refills: 0 | Status: COMPLETED | OUTPATIENT
Start: 2018-01-15 | End: 2018-01-15

## 2018-01-15 RX ADMIN — Medication 1: at 13:44

## 2018-01-15 RX ADMIN — Medication 1 EACH: at 17:05

## 2018-01-15 RX ADMIN — Medication 50 MILLIGRAM(S): at 13:39

## 2018-01-15 RX ADMIN — Medication 12.5 GRAM(S): at 21:08

## 2018-01-15 RX ADMIN — CINACALCET 60 MILLIGRAM(S): 30 TABLET, FILM COATED ORAL at 12:15

## 2018-01-15 RX ADMIN — Medication 12.5 GRAM(S): at 22:38

## 2018-01-15 RX ADMIN — LISINOPRIL 40 MILLIGRAM(S): 2.5 TABLET ORAL at 05:41

## 2018-01-15 RX ADMIN — Medication 25 MILLIGRAM(S): at 05:41

## 2018-01-15 RX ADMIN — Medication 50 MILLIGRAM(S): at 05:41

## 2018-01-15 RX ADMIN — Medication 50 MILLIGRAM(S): at 22:27

## 2018-01-15 RX ADMIN — Medication 81 MILLIGRAM(S): at 12:15

## 2018-01-15 RX ADMIN — CLOPIDOGREL BISULFATE 75 MILLIGRAM(S): 75 TABLET, FILM COATED ORAL at 22:27

## 2018-01-15 RX ADMIN — ATORVASTATIN CALCIUM 20 MILLIGRAM(S): 80 TABLET, FILM COATED ORAL at 22:27

## 2018-01-15 RX ADMIN — Medication 25 MILLIGRAM(S): at 18:25

## 2018-01-15 RX ADMIN — HUMAN INSULIN 5 UNIT(S): 100 INJECTION, SUSPENSION SUBCUTANEOUS at 13:44

## 2018-01-15 NOTE — PROGRESS NOTE ADULT - PROBLEM SELECTOR PLAN 1
-test BG AC/HS/2AM.   -Start insulin pump with humalog at home settings as indicated in orders.  -Will adjust further if needed.   Next site change in 3 days.  has supplies.   -discussed plan w/pt and team

## 2018-01-15 NOTE — PROGRESS NOTE ADULT - ASSESSMENT
63 y/o F w/ uncontrolled Type 1 DM w/ ESRD on HD neuropathy, retinopathy, macrovascular complications of CAD on insulin pump at home with hyperglycemia now admitted with DKA started back on pump now (high risk patient with high level decision-making)

## 2018-01-15 NOTE — PROGRESS NOTE ADULT - SUBJECTIVE AND OBJECTIVE BOX
[facility]   [phone]  RENAL FOLLOW UP NOTE  --------------------------------------------------------------------------------  Chief Complaint:    24 hour events/subjective:    seen earlier. states has had nausea no vomiting. no cp or palp or sob     PAST HISTORY  --------------------------------------------------------------------------------  No significant changes to PMH, PSH, FHx, SHx, unless otherwise noted    ALLERGIES & MEDICATIONS  --------------------------------------------------------------------------------  Allergies    No Known Allergies    Intolerances      Standing Inpatient Medications  aspirin enteric coated 81 milliGRAM(s) Oral daily  atorvastatin 20 milliGRAM(s) Oral at bedtime  cinacalcet 60 milliGRAM(s) Oral daily  clopidogrel Tablet 75 milliGRAM(s) Oral at bedtime  dextrose 5%. 1000 milliLiter(s) IV Continuous <Continuous>  dextrose 50% Injectable 12.5 Gram(s) IV Push once  dextrose 50% Injectable 25 Gram(s) IV Push once  dextrose 50% Injectable 25 Gram(s) IV Push once  heparin  Injectable 5000 Unit(s) SubCutaneous every 8 hours  hydrALAZINE 50 milliGRAM(s) Oral every 8 hours  insulin lispro (HumaLOG) corrective regimen sliding scale   SubCutaneous three times a day before meals  lisinopril 40 milliGRAM(s) Oral daily  metoprolol     tartrate 25 milliGRAM(s) Oral two times a day    PRN Inpatient Medications  dextrose Gel 1 Dose(s) Oral once PRN  glucagon  Injectable 1 milliGRAM(s) IntraMuscular once PRN  oxyCODONE    5 mG/acetaminophen 325 mG 1 Tablet(s) Oral every 6 hours PRN      REVIEW OF SYSTEMS  --------------------------------------------------------------------------------    Gen: denies, fevers/chills,  CVS: denies chest pain/palpitations  Resp: denies SOB/Cough  GI: Denies V/Abd pain  : Denies dysuria/  All other systems were reviewed and are negative, except as noted.    VITALS/PHYSICAL EXAM  --------------------------------------------------------------------------------  T(C): 36.8 (01-15-18 @ 12:00), Max: 36.8 (01-14-18 @ 16:00)  HR: 76 (01-15-18 @ 13:00) (72 - 80)  BP: 164/90 (01-15-18 @ 13:00) (124/66 - 164/90)  RR: 14 (01-15-18 @ 13:00) (14 - 22)  SpO2: 100% (01-15-18 @ 13:00) (97% - 100%)  Wt(kg): --        01-14-18 @ 07:01  -  01-15-18 @ 07:00  --------------------------------------------------------  IN: 406 mL / OUT: 0 mL / NET: 406 mL    01-15-18 @ 07:01  -  01-15-18 @ 15:14  --------------------------------------------------------  IN: 180 mL / OUT: 0 mL / NET: 180 mL      Physical Exam:  	Gen: alert oriented   	Pulm: mild Decreased breath sounds b/l bases. no rales or ronchi or wheezing  	CV: RRR, S1/S2. no rub  	Back: No CVA tenderness; no sacral edema  	Abd: +BS, soft, nontender/nondistended  	: No suprapubic tenderness.               Extremity: No cyanosis, no edema no clubbing  	  LABS/STUDIES  --------------------------------------------------------------------------------              7.8    6.3   >-----------<  271      [01-15-18 @ 02:48]              22.8     138  |  97  |  26  ----------------------------<  153      [01-15-18 @ 02:48]  4.0   |  29  |  4.64        Ca     7.1     [01-15-18 @ 02:48]      Mg     2.0     [01-15-18 @ 02:48]      Phos  3.9     [01-15-18 @ 02:48]    TPro  6.1  /  Alb  3.2  /  TBili  0.2  /  DBili  x   /  AST  82  /  ALT  58  /  AlkPhos  152  [01-15-18 @ 02:48]        Troponin 2.88      [01-14-18 @ 16:44]        [01-14-18 @ 16:44]    Creatinine Trend:  SCr 4.64 [01-15 @ 02:48]  SCr 4.08 [01-14 @ 14:48]  SCr 3.74 [01-14 @ 08:33]  SCr 3.32 [01-14 @ 04:24]  SCr 2.91 [01-14 @ 00:20]                  HbA1c 8.5      [11-25-17 @ 04:12]  TSH 1.07      [12-19-17 @ 06:12]

## 2018-01-15 NOTE — PROGRESS NOTE ADULT - SUBJECTIVE AND OBJECTIVE BOX
Patient is a 62y old  Female who presents with a chief complaint of Nausea, Weakness, Diffuse abdominal pain (13 Jan 2018 04:03)      SUBJECTIVE / OVERNIGHT EVENTS: Comfortable without new complaints. Resolved abdominal p[ain.  Review of Systems  chest pain no  palpitations no  sob no  nausea no  headache no    MEDICATIONS  (STANDING):  aspirin enteric coated 81 milliGRAM(s) Oral daily  atorvastatin 20 milliGRAM(s) Oral at bedtime  cinacalcet 60 milliGRAM(s) Oral daily  clopidogrel Tablet 75 milliGRAM(s) Oral at bedtime  dextrose 5%. 1000 milliLiter(s) (50 mL/Hr) IV Continuous <Continuous>  dextrose 50% Injectable 12.5 Gram(s) IV Push once  dextrose 50% Injectable 25 Gram(s) IV Push once  dextrose 50% Injectable 25 Gram(s) IV Push once  heparin  Injectable 5000 Unit(s) SubCutaneous every 8 hours  hydrALAZINE 50 milliGRAM(s) Oral every 8 hours  insulin lispro (HumaLOG) corrective regimen sliding scale   SubCutaneous three times a day before meals  lisinopril 40 milliGRAM(s) Oral daily  metoprolol     tartrate 25 milliGRAM(s) Oral two times a day    MEDICATIONS  (PRN):  dextrose Gel 1 Dose(s) Oral once PRN Blood Glucose LESS THAN 70 milliGRAM(s)/deciLiter  glucagon  Injectable 1 milliGRAM(s) IntraMuscular once PRN Glucose <70 milliGRAM(s)/deciLiter  oxyCODONE    5 mG/acetaminophen 325 mG 1 Tablet(s) Oral every 6 hours PRN Moderate to Severe Pain          PHYSICAL EXAM:  GENERAL: NAD, well-developed  HEAD:  Atraumatic, Normocephalic  EYES: EOMI, PERRLA, conjunctiva and sclera clear  NECK: Supple, No JVD  CHEST/LUNG: Clear to auscultation bilaterally; No wheeze  HEART: Regular rate and rhythm; No murmurs, rubs, or gallops  ABDOMEN: Soft, Nontender, Nondistended; Bowel sounds present  EXTREMITIES:  2+ Peripheral Pulses, No clubbing, cyanosis, or edema  PSYCH: AAOx3  NEUROLOGY: non-focal  SKIN: No rashes or lesions    LABS:                        7.8    6.3   )-----------( 271      ( 15 Jah 2018 02:48 )             22.8     01-15    138  |  97  |  26<H>  ----------------------------<  153<H>  4.0   |  29  |  4.64<H>    Ca    7.1<L>      15 Jah 2018 02:48  Phos  3.9     01-15  Mg     2.0     01-15    TPro  6.1  /  Alb  3.2<L>  /  TBili  0.2  /  DBili  x   /  AST  82<H>  /  ALT  58<H>  /  AlkPhos  152<H>  01-15      CARDIAC MARKERS ( 14 Jan 2018 16:44 )  x     / 2.88 ng/mL / 190 U/L / x     / 7.9 ng/mL  CARDIAC MARKERS ( 14 Jan 2018 08:33 )  x     / 3.19 ng/mL / 229 U/L / x     / 12.8 ng/mL          RADIOLOGY & ADDITIONAL TESTS:    Imaging Personally Reviewed:    Consultant(s) Notes Reviewed:      Care Discussed with Consultants/Other Providers:

## 2018-01-15 NOTE — PROGRESS NOTE ADULT - ATTENDING COMMENTS
61 yo woman with type I DM and ESRD on HD here for DKA, now resolved. Transition insulin to SC. Advance diet. DVT ppx. Floor eligible.    30 minutes critical time spent.
61 yo woman with type I DM and ESRD on HD here for DKA, now resolved. Has been having abdominal pain with nonbloody emesis; bedside ultrasound non diagnostic. Gastroparesis is the most likely possibility; work up in progress.  DVT ppx. Floor eligible.    30 minutes critical time spent.

## 2018-01-15 NOTE — PROGRESS NOTE ADULT - ASSESSMENT
63 y/o female with above stated history including ESRD on HD, DKA on insulin pump, presents with hyperkalemia, DKA recurrent   1- esrd  2- recurrent dka  3- HTN   4- shpt  hd am   cont with insulin regimen as above.   sensipar 60mg qd  renvela two tab with meals  hydralazine 25mg tid

## 2018-01-15 NOTE — PROGRESS NOTE ADULT - ASSESSMENT
62F w/ DM1 (on insulin pump, with multiple admissions for DKA), HTN, HLD, former smoker, MI, CAD s/p PCI to RCA and brachytherapy in Aug 2017, dCHF (last TTE in Nov 2017 mild-mod MR, mild AS, mild-mod TR, EF 40-45%), chronic LLE pain and edema in setting of severe PVD s/p L & R fem-pop bypass graft with fem-pop bypass revisions, left subclavian vein stenosis s/p stent, ESRD on HD (Mon/Tu/Thr/Sat) via L AVF with oliguria, CVA with memory deficit, and depression presented with generalized weakness, diffuse abdominal pain/N/V x 1 day, and recent flu-like symptoms and found to be in DKA     PLAN:  #Neuro  -- At baseline AAOx 3, no issues. C/w atorvastatin for h/o CVA.   -- C/w Cymbalta for depression.    #Pulmonary  - initial concern for cap but on bedside us there was no concerning signs of infiltration and therefore monitored off abx    #Cardiac  -- C/w home lisinopril, hydralazine for HTN  -- C/w metoprolol 25mg BID  -- C/w home ASA/ plavix for CAD    #Endo  -DKA. gap closed and bridge to lantus sq. per endo will only require low correction scale insulin for 24hr and then endo will restart insulin pump    #Heme  -- Anemia of chronic disease in setting of ESRD, at baseline.     #ID  - monitoring off abx. likely had viral gastroenteritis prior to admission which symptomatically resolved    #Renal  -ESRD on HD: per nephrology for HD. typically M/W/F/Sa. Left upper arm av fistula    #GI  -abdominal pain likely 2/2 resolved DKA/possible viral gastroenteritis? patient reports sick contacts    #PPX  -- HSQ for dvt ppx  -- Right IJ triple lumen because unable to place peripheral or US-guided IV. will transfer to medicine w/ access and if patient requires longer duration access may need PICC    #GOC  -- Full code 62F w/ DM1 (on insulin pump, with multiple admissions for DKA), HTN, HLD, former smoker, MI, CAD s/p PCI to RCA and brachytherapy in Aug 2017, dCHF (last TTE in Nov 2017 mild-mod MR, mild AS, mild-mod TR, EF 40-45%), chronic LLE pain and edema in setting of severe PVD s/p L & R fem-pop bypass graft with fem-pop bypass revisions, left subclavian vein stenosis s/p stent, ESRD on HD (Mon/Tu/Thr/Sat) via L AVF with oliguria, CVA with memory deficit, and depression presented with generalized weakness, diffuse abdominal pain/N/V x 1 day, and recent flu-like symptoms and found to be in DKA and admit to MICU and treated w/ insulin gtt and transitioned to SQ insulin.    PLAN:  --Neuro  -- At baseline AAOx 3, no issues. C/w atorvastatin for h/o CVA.   -- C/w Cymbalta for depression.    --Pulmonary  - initial concern for cap but on bedside us there was no concerning signs of infiltration and therefore monitored off abx    --Cardiac  -- C/w home lisinopril, hydralazine for HTN  -- C/w metoprolol 25mg BID  -- C/w home ASA/ plavix for CAD    --Endo  #DKA now resolved  - continue to follow up endo recs. per discussion yesterday endo to reinstate insulin pump. AG remains closed however patient has not tolerated meal yet likely from gastroenteritis. Continue to monitor w/ ISS and FS w/ meals, qhs and 2am. D5 prn if not eating    --Heme  -- Mixed Anemia: of chronic disease in setting of ESRD w/ macrocytosis, at baseline.     --ID  - monitoring off abx. likely had viral gastroenteritis prior to admission. blood cx ngtd, rvp non-detected, hep panel neg    --Renal  -ESRD on HD: per nephrology for HD. typically M/W/F/Sa. Left upper arm av fistula    --GI  -abdominal pain likely 2/2 resolved DKA/possible viral gastroenteritis? patient reports sick contacts. noted s/p cholecystectomy and bedside US w/o dilated biliary tree or dilated ureters. CT A/P w/ IV co on 1/13 w/o acute pathology    --PPX  -- HSQ for dvt ppx  -- Right IJ triple lumen because unable to place peripheral or US-guided IV. will transfer to medicine w/ access and if patient requires longer duration access may need PICC    #GOC  -- Full code 62F w/ DM1 (on insulin pump, with multiple admissions for DKA), HTN, HLD, former smoker, MI, CAD s/p PCI to RCA and brachytherapy in Aug 2017, dCHF (last TTE in Nov 2017 mild-mod MR, mild AS, mild-mod TR, EF 40-45%), chronic LLE pain and edema in setting of severe PVD s/p L & R fem-pop bypass graft with fem-pop bypass revisions, left subclavian vein stenosis s/p stent, ESRD on HD (Mon/Tu/Thr/Sat) via L AVF with oliguria, CVA with memory deficit, and depression presented with generalized weakness, diffuse abdominal pain/N/V x 1 day, and recent flu-like symptoms and found to be in DKA and admit to MICU and treated w/ insulin gtt and transitioned to SQ insulin.    PLAN:  --Neuro  -- At baseline AAOx 3, no issues. C/w atorvastatin for h/o CVA.   -- C/w Cymbalta for depression.    --Pulmonary  - initial concern for cap but on bedside us there was no concerning signs of infiltration and therefore monitored off abx    --Cardiac  -- C/w home lisinopril, hydralazine for HTN  -- C/w metoprolol 25mg BID  -- C/w home ASA/ plavix for CAD    --Endo  #DKA now resolved  - continue to follow up endo recs. per discussion yesterday endo to reinstate insulin pump. AG remains closed however patient has not tolerated meal yet likely from gastroenteritis. Continue to monitor w/ ISS and FS w/ meals, qhs and 2am. D5 prn if not eating    --Heme  -- Mixed Anemia: of chronic disease in setting of ESRD w/ macrocytosis, at baseline.     --ID  - monitoring off abx. likely had viral gastroenteritis prior to admission. blood cx ngtd, rvp non-detected, hep panel neg    --Renal  -ESRD on HD: per nephrology for HD. typically M/W/F/Sa. Left upper arm av fistula    --GI  -abdominal pain likely 2/2 resolved DKA/possible viral gastroenteritis? patient reports sick contacts. noted s/p cholecystectomy and bedside US w/o dilated biliary tree or dilated ureters. CT A/P w/ IV co on 1/13 w/o acute pathology    --PPX  -- HSQ for dvt ppx  -- Right IJ triple lumen to be removed    #GOC  -- Full code

## 2018-01-15 NOTE — PROGRESS NOTE ADULT - SUBJECTIVE AND OBJECTIVE BOX
CHIEF COMPLAINT: Patient is a 62y old  Female who presents with a chief complaint of Nausea, Weakness, Diffuse abdominal pain (13 Jan 2018 04:03)    Interval Events:    REVIEW OF SYSTEMS:      OBJECTIVE:  ICU Vital Signs Last 24 Hrs  T(C): 36.6 (15 Jah 2018 04:00), Max: 37.1 (14 Jan 2018 08:00)  T(F): 97.9 (15 Jah 2018 04:00), Max: 98.8 (14 Jan 2018 08:00)  HR: 73 (15 Jah 2018 07:00) (71 - 80)  BP: 141/75 (15 Jah 2018 07:00) (116/82 - 163/93)  BP(mean): 103 (15 Jah 2018 07:00) (89 - 122)  ABP: --  ABP(mean): --  RR: 16 (15 Jah 2018 07:00) (14 - 22)  SpO2: 100% (15 Jah 2018 07:00) (97% - 100%)        01-14 @ 07:01  -  01-15 @ 07:00  --------------------------------------------------------  IN: 406 mL / OUT: 0 mL / NET: 406 mL      CAPILLARY BLOOD GLUCOSE      POCT Blood Glucose.: 173 mg/dL (14 Jan 2018 22:08)      PHYSICAL EXAM:    HOSPITAL MEDICATIONS:  MEDICATIONS  (STANDING):  aspirin enteric coated 81 milliGRAM(s) Oral daily  atorvastatin 20 milliGRAM(s) Oral at bedtime  cinacalcet 60 milliGRAM(s) Oral daily  clopidogrel Tablet 75 milliGRAM(s) Oral at bedtime  dextrose 5%. 1000 milliLiter(s) (50 mL/Hr) IV Continuous <Continuous>  dextrose 50% Injectable 12.5 Gram(s) IV Push once  dextrose 50% Injectable 25 Gram(s) IV Push once  dextrose 50% Injectable 25 Gram(s) IV Push once  DULoxetine 60 milliGRAM(s) Oral daily  heparin  Injectable 5000 Unit(s) SubCutaneous every 8 hours  hydrALAZINE 50 milliGRAM(s) Oral every 8 hours  insulin lispro (HumaLOG) corrective regimen sliding scale   SubCutaneous three times a day before meals  lisinopril 40 milliGRAM(s) Oral daily  metoprolol     tartrate 25 milliGRAM(s) Oral two times a day    MEDICATIONS  (PRN):  dextrose Gel 1 Dose(s) Oral once PRN Blood Glucose LESS THAN 70 milliGRAM(s)/deciLiter  glucagon  Injectable 1 milliGRAM(s) IntraMuscular once PRN Glucose <70 milliGRAM(s)/deciLiter  oxyCODONE    5 mG/acetaminophen 325 mG 1 Tablet(s) Oral every 6 hours PRN Moderate to Severe Pain      LABS:  (01-15 @ 02:48)                        7.8  6.3 )-----------( 271                 22.8    Neutrophils = 4.7 (74.1%)  Lymphocytes = 1.0 (16.0%)  Eosinophils = 0.1 (1.2%)  Basophils = 0.0 (0.2%)  Monocytes = 0.5 (8.5%)  Bands = --%    WBC Trend: 6.3<--, 8.3<--, 11.6<--  Hb Trend: 7.8<--, 7.8<--, 7.4<--, 8.5<--  Plt Trend: 271<--, 287<--, 284<--, 374<--  01-15    138  |  97  |  26<H>  ----------------------------<  153<H>  4.0   |  29  |  4.64<H>    Ca    7.1<L>      15 Jah 2018 02:48  Phos  3.9     01-15  Mg     2.0     01-15    TPro  6.1  /  Alb  3.2<L>  /  TBili  0.2  /  DBili  x   /  AST  82<H>  /  ALT  58<H>  /  AlkPhos  152<H>  01-15    Creatinine Trend: 4.64<--, 4.08<--, 3.74<--, 3.32<--, 2.91<--, 2.67<--        Venous Blood Gas:  01-15 @ 02:46  7.34/57/35/30/56  VBG Lactate: 0.7  Venous Blood Gas:  01-14 @ 14:42  7.37/53/43/30/71  VBG Lactate: 0.9  Venous Blood Gas:  01-14 @ 08:14  7.37/56/40/32/66  VBG Lactate: 0.8  Venous Blood Gas:  01-14 @ 04:14  7.37/55/35/31/56  VBG Lactate: 1.4  Venous Blood Gas:  01-14 @ 00:16  7.38/55/40/32/66  VBG Lactate: 0.7  Venous Blood Gas:  01-13 @ 20:06  7.44/46/118/31/97  VBG Lactate: 0.6  Venous Blood Gas:  01-13 @ 16:24  7.34/55/46/29/73  VBG Lactate: 1.9  Venous Blood Gas:  01-13 @ 11:35  7.30/54/48/26/73  VBG Lactate: 2.0  Venous Blood Gas:  01-13 @ 08:44  7.27/54/41/24/58  VBG Lactate: 1.9    CARDIAC MARKERS ( 14 Jan 2018 16:44 )  Trop 2.88 ng/mL<H> /  U/L<H> / CKMB x       CARDIAC MARKERS ( 14 Jan 2018 08:33 )  Trop 3.19 ng/mL<H> /  U/L<H> / CKMB x       CARDIAC MARKERS ( 13 Jan 2018 09:39 )  Trop 2.57 ng/mL<H> /  U/L<H> / CKMB x             MICROBIOLOGY:   Blood Cx:  Urine Cx:  Sputum Cx:  Legionella:  RVP:    RADIOLOGY:  X Ray:  CT:  MRI:  Ultrasound:  [ ] Reviewed and interpreted by me    EKG: CHIEF COMPLAINT: Patient is a 62y old  Female who presents with a chief complaint of Nausea, Weakness, Diffuse abdominal pain (13 Jan 2018 04:03)    Interval Events:    REVIEW OF SYSTEMS:  CONSTITUTIONAL: No fever, no chills  EYES: No eye pain, no visual disturbance  Mouth: no pain in mouth, no tooth pain  RESPIRATORY: No cough, No sob  CARDIOVASCULAR: No CP, no palpitations  GASTROINTESTINAL: intermittent abdominal pain which she appreciates when people push on her stomach, nausea w/ NBNB emesis yesterday but not overnight, no diarrhea  GENITOURINARY: No dysuria, no hematuria  NEUROLOGICAL: No headaches, no weakness  SKIN: No itching, no rashes  MUSCULOSKELETAL: No joint pain, no joint swelling      OBJECTIVE:  ICU Vital Signs Last 24 Hrs  T(C): 36.6 (15 Jah 2018 04:00), Max: 37.1 (14 Jan 2018 08:00)  T(F): 97.9 (15 Jah 2018 04:00), Max: 98.8 (14 Jan 2018 08:00)  HR: 73 (15 Jah 2018 07:00) (71 - 80)  BP: 141/75 (15 Jah 2018 07:00) (116/82 - 163/93)  BP(mean): 103 (15 Jah 2018 07:00) (89 - 122)  ABP: --  ABP(mean): --  RR: 16 (15 Jah 2018 07:00) (14 - 22)  SpO2: 100% (15 Jah 2018 07:00) (97% - 100%)        01-14 @ 07:01  -  01-15 @ 07:00  --------------------------------------------------------  IN: 406 mL / OUT: 0 mL / NET: 406 mL      CAPILLARY BLOOD GLUCOSE      POCT Blood Glucose.: 173 mg/dL (14 Jan 2018 22:08)      PHYSICAL EXAM:  GENERAL: NAD, well-developed, sleeping when started exam  HEAD:  Atraumatic, Normocephalic  EYES: EOMI, PERRLA, conjunctiva and sclera clear  Mouth: MMM, no lesions  NECK: Supple, no appreciable masses, RIJ central line w/o  purulence, erythema and w/o pain to palpation  Lung: normal work of breathing, cta b/l  Chest: S1&S2+, rrr, no m/r/g appreciated  ABDOMEN: bs+, soft, moderate tenderness to palpation of RUQ/RLQ/epigastrium, nd, no appreciable masses  : No irene catheter, no CVA tenderness  EXTREMITIES: radial pulse present b/l, PT present b/l, no edema in LE b/l  Neuro: A&Ox3, no focal deficits  SKIN: warm and dry, no visible rashes  Line: RI triple lumen catheter      [facility] MEDICATIONS:  MEDICATIONS  (STANDING):  aspirin enteric coated 81 milliGRAM(s) Oral daily  atorvastatin 20 milliGRAM(s) Oral at bedtime  cinacalcet 60 milliGRAM(s) Oral daily  clopidogrel Tablet 75 milliGRAM(s) Oral at bedtime  dextrose 5%. 1000 milliLiter(s) (50 mL/Hr) IV Continuous <Continuous>  dextrose 50% Injectable 12.5 Gram(s) IV Push once  dextrose 50% Injectable 25 Gram(s) IV Push once  dextrose 50% Injectable 25 Gram(s) IV Push once  DULoxetine 60 milliGRAM(s) Oral daily  heparin  Injectable 5000 Unit(s) SubCutaneous every 8 hours  hydrALAZINE 50 milliGRAM(s) Oral every 8 hours  insulin lispro (HumaLOG) corrective regimen sliding scale   SubCutaneous three times a day before meals  lisinopril 40 milliGRAM(s) Oral daily  metoprolol     tartrate 25 milliGRAM(s) Oral two times a day    MEDICATIONS  (PRN):  dextrose Gel 1 Dose(s) Oral once PRN Blood Glucose LESS THAN 70 milliGRAM(s)/deciLiter  glucagon  Injectable 1 milliGRAM(s) IntraMuscular once PRN Glucose <70 milliGRAM(s)/deciLiter  oxyCODONE    5 mG/acetaminophen 325 mG 1 Tablet(s) Oral every 6 hours PRN Moderate to Severe Pain      LABS:  (01-15 @ 02:48)                        7.8  6.3 )-----------( 271                 22.8    Neutrophils = 4.7 (74.1%)  Lymphocytes = 1.0 (16.0%)  Eosinophils = 0.1 (1.2%)  Basophils = 0.0 (0.2%)  Monocytes = 0.5 (8.5%)  Bands = --%    WBC Trend: 6.3<--, 8.3<--, 11.6<--  Hb Trend: 7.8<--, 7.8<--, 7.4<--, 8.5<--  Plt Trend: 271<--, 287<--, 284<--, 374<--  01-15    138  |  97  |  26<H>  ----------------------------<  153<H>  4.0   |  29  |  4.64<H>    Ca    7.1<L>      15 Jah 2018 02:48  Phos  3.9     01-15  Mg     2.0     01-15    TPro  6.1  /  Alb  3.2<L>  /  TBili  0.2  /  DBili  x   /  AST  82<H>  /  ALT  58<H>  /  AlkPhos  152<H>  01-15    Creatinine Trend: 4.64<--, 4.08<--, 3.74<--, 3.32<--, 2.91<--, 2.67<--        Venous Blood Gas:  01-15 @ 02:46  7.34/57/35/30/56  VBG Lactate: 0.7  Venous Blood Gas:  01-14 @ 14:42  7.37/53/43/30/71  VBG Lactate: 0.9  Venous Blood Gas:  01-14 @ 08:14  7.37/56/40/32/66  VBG Lactate: 0.8  Venous Blood Gas:  01-14 @ 04:14  7.37/55/35/31/56  VBG Lactate: 1.4  Venous Blood Gas:  01-14 @ 00:16  7.38/55/40/32/66  VBG Lactate: 0.7  Venous Blood Gas:  01-13 @ 20:06  7.44/46/118/31/97  VBG Lactate: 0.6  Venous Blood Gas:  01-13 @ 16:24  7.34/55/46/29/73  VBG Lactate: 1.9  Venous Blood Gas:  01-13 @ 11:35  7.30/54/48/26/73  VBG Lactate: 2.0  Venous Blood Gas:  01-13 @ 08:44  7.27/54/41/24/58  VBG Lactate: 1.9    CARDIAC MARKERS ( 14 Jan 2018 16:44 )  Trop 2.88 ng/mL<H> /  U/L<H> / CKMB x       CARDIAC MARKERS ( 14 Jan 2018 08:33 )  Trop 3.19 ng/mL<H> /  U/L<H> / CKMB x       CARDIAC MARKERS ( 13 Jan 2018 09:39 )  Trop 2.57 ng/mL<H> /  U/L<H> / CKMB x             MICROBIOLOGY:   Blood Cx: NGTD on 1/13  Urine Cx:  Sputum Cx:  Legionella:  RVP: not detected 1/13  Hepatitis panel  -HepA IgM- Non-reactive  -HepB S Ag Non-reactive  -HepB S Ab- Non-ractive  -HepB Core- Non-ractive  -HepC-Non Reactive    RADIOLOGY:  X Ray:  CT:  MRI:  Ultrasound:  [ ] Reviewed and interpreted by me    EKG: CHIEF COMPLAINT: Patient is a 62y old  Female who presents with a chief complaint of Nausea, Weakness, Diffuse abdominal pain (13 Jan 2018 04:03)    Interval Events: No acute events overnight however patient earlier in the evening did not tolerate PO and had NBNB emesis but this am feels well and tolerated breakfast    REVIEW OF SYSTEMS:  CONSTITUTIONAL: No fever, no chills  EYES: No eye pain, no visual disturbance  Mouth: no pain in mouth, no tooth pain  RESPIRATORY: No cough, No sob  CARDIOVASCULAR: No CP, no palpitations  GASTROINTESTINAL: intermittent abdominal pain which she appreciates when people push on her stomach, nausea w/ NBNB emesis yesterday but not overnight, no diarrhea  GENITOURINARY: No dysuria, no hematuria  NEUROLOGICAL: No headaches, no weakness  SKIN: No itching, no rashes  MUSCULOSKELETAL: No joint pain, no joint swelling      OBJECTIVE:  ICU Vital Signs Last 24 Hrs  T(C): 36.6 (15 Jah 2018 04:00), Max: 37.1 (14 Jan 2018 08:00)  T(F): 97.9 (15 Jah 2018 04:00), Max: 98.8 (14 Jan 2018 08:00)  HR: 73 (15 Jah 2018 07:00) (71 - 80)  BP: 141/75 (15 Jah 2018 07:00) (116/82 - 163/93)  BP(mean): 103 (15 Jah 2018 07:00) (89 - 122)  ABP: --  ABP(mean): --  RR: 16 (15 Jah 2018 07:00) (14 - 22)  SpO2: 100% (15 Jah 2018 07:00) (97% - 100%)        01-14 @ 07:01  -  01-15 @ 07:00  --------------------------------------------------------  IN: 406 mL / OUT: 0 mL / NET: 406 mL      CAPILLARY BLOOD GLUCOSE      POCT Blood Glucose.: 173 mg/dL (14 Jan 2018 22:08)      PHYSICAL EXAM:  GENERAL: NAD, well-developed, sleeping when started exam  HEAD:  Atraumatic, Normocephalic  EYES: EOMI, PERRLA, conjunctiva and sclera clear  Mouth: MMM, no lesions  NECK: Supple, no appreciable masses, RIJ central line w/o  purulence, erythema and w/o pain to palpation  Lung: normal work of breathing, cta b/l  Chest: S1&S2+, rrr, no m/r/g appreciated  ABDOMEN: bs+, soft, moderate tenderness to palpation of RUQ/RLQ/epigastrium, nd, no appreciable masses  : No irene catheter, no CVA tenderness  EXTREMITIES: radial pulse present b/l, PT present b/l, no edema in LE b/l  Neuro: A&Ox3, no focal deficits  SKIN: warm and dry, no visible rashes  Line: RIJ triple lumen catheter      HOSPITAL MEDICATIONS:  MEDICATIONS  (STANDING):  aspirin enteric coated 81 milliGRAM(s) Oral daily  atorvastatin 20 milliGRAM(s) Oral at bedtime  cinacalcet 60 milliGRAM(s) Oral daily  clopidogrel Tablet 75 milliGRAM(s) Oral at bedtime  dextrose 5%. 1000 milliLiter(s) (50 mL/Hr) IV Continuous <Continuous>  dextrose 50% Injectable 12.5 Gram(s) IV Push once  dextrose 50% Injectable 25 Gram(s) IV Push once  dextrose 50% Injectable 25 Gram(s) IV Push once  DULoxetine 60 milliGRAM(s) Oral daily  heparin  Injectable 5000 Unit(s) SubCutaneous every 8 hours  hydrALAZINE 50 milliGRAM(s) Oral every 8 hours  insulin lispro (HumaLOG) corrective regimen sliding scale   SubCutaneous three times a day before meals  lisinopril 40 milliGRAM(s) Oral daily  metoprolol     tartrate 25 milliGRAM(s) Oral two times a day    MEDICATIONS  (PRN):  dextrose Gel 1 Dose(s) Oral once PRN Blood Glucose LESS THAN 70 milliGRAM(s)/deciLiter  glucagon  Injectable 1 milliGRAM(s) IntraMuscular once PRN Glucose <70 milliGRAM(s)/deciLiter  oxyCODONE    5 mG/acetaminophen 325 mG 1 Tablet(s) Oral every 6 hours PRN Moderate to Severe Pain      LABS:  (01-15 @ 02:48)                        7.8  6.3 )-----------( 271                 22.8    Neutrophils = 4.7 (74.1%)  Lymphocytes = 1.0 (16.0%)  Eosinophils = 0.1 (1.2%)  Basophils = 0.0 (0.2%)  Monocytes = 0.5 (8.5%)  Bands = --%    WBC Trend: 6.3<--, 8.3<--, 11.6<--  Hb Trend: 7.8<--, 7.8<--, 7.4<--, 8.5<--  Plt Trend: 271<--, 287<--, 284<--, 374<--  01-15    138  |  97  |  26<H>  ----------------------------<  153<H>  4.0   |  29  |  4.64<H>    Ca    7.1<L>      15 Jah 2018 02:48  Phos  3.9     01-15  Mg     2.0     01-15    TPro  6.1  /  Alb  3.2<L>  /  TBili  0.2  /  DBili  x   /  AST  82<H>  /  ALT  58<H>  /  AlkPhos  152<H>  01-15    Creatinine Trend: 4.64<--, 4.08<--, 3.74<--, 3.32<--, 2.91<--, 2.67<--        Venous Blood Gas:  01-15 @ 02:46  7.34/57/35/30/56  VBG Lactate: 0.7  Venous Blood Gas:  01-14 @ 14:42  7.37/53/43/30/71  VBG Lactate: 0.9  Venous Blood Gas:  01-14 @ 08:14  7.37/56/40/32/66  VBG Lactate: 0.8  Venous Blood Gas:  01-14 @ 04:14  7.37/55/35/31/56  VBG Lactate: 1.4  Venous Blood Gas:  01-14 @ 00:16  7.38/55/40/32/66  VBG Lactate: 0.7  Venous Blood Gas:  01-13 @ 20:06  7.44/46/118/31/97  VBG Lactate: 0.6  Venous Blood Gas:  01-13 @ 16:24  7.34/55/46/29/73  VBG Lactate: 1.9  Venous Blood Gas:  01-13 @ 11:35  7.30/54/48/26/73  VBG Lactate: 2.0  Venous Blood Gas:  01-13 @ 08:44  7.27/54/41/24/58  VBG Lactate: 1.9    CARDIAC MARKERS ( 14 Jan 2018 16:44 )  Trop 2.88 ng/mL<H> /  U/L<H> / CKMB x       CARDIAC MARKERS ( 14 Jan 2018 08:33 )  Trop 3.19 ng/mL<H> /  U/L<H> / CKMB x       CARDIAC MARKERS ( 13 Jan 2018 09:39 )  Trop 2.57 ng/mL<H> /  U/L<H> / CKMB x             MICROBIOLOGY:   Blood Cx: NGTD on 1/13  Urine Cx:  Sputum Cx:  Legionella:  RVP: not detected 1/13  Hepatitis panel  -HepA IgM- Non-reactive  -HepB S Ag Non-reactive  -HepB S Ab- Non-ractive  -HepB Core- Non-ractive  -HepC-Non Reactive    RADIOLOGY:  X Ray:  CT:  MRI:  Ultrasound:  [ ] Reviewed and interpreted by me    EKG:

## 2018-01-15 NOTE — CHART NOTE - NSCHARTNOTEFT_GEN_A_CORE
Notified by RN in regards to hypoglycemia to 21 post treatment with Dextrose 50% injection 12.5 grams IV push and apple juice. As per RN patient mentating and asymptomatic with blood glucose of 21. Patient seen and evaluated at the bedside post treatment. Patient sitting in bed in no acute distress. Alert and oriented x 3. Endorses feeling cold and tired in a sense of sleepiness Notified by RN in regards to hypoglycemia to 21 post treatment with Dextrose 50% injection 12.5 grams IV push and apple juice. As per RN patient mentating and asymptomatic with blood glucose of 21. Patient seen and evaluated at the bedside post treatment. Patient sitting in bed in no acute distress. Alert and oriented x 3. Endorses feeling cold and tired in a sense of sleepiness. Denies shakiness, confusion, nausea, vomiting, headache, diaphoresis, shortness of breath, chest pain, palpations, and abdominal pain.     Vital Signs Last 24 Hrs  T(C): 36.8 (15 Jah 2018 16:00), Max: 36.8 (15 Jah 2018 12:00)  T(F): 98.3 (15 Jah 2018 16:00), Max: 98.3 (15 Jah 2018 16:00)  HR: 73 (15 Jah 2018 21:40) (72 - 82)  BP: 143/89 (15 Jah 2018 21:40) (124/66 - 164/90)  BP(mean): 108 (15 Jah 2018 19:00) (89 - 120)  RR: 18 (15 Jah 2018 21:40) (14 - 23)  SpO2: 96% (15 Jah 2018 21:40) (93% - 100%)    FS: 21,23,95,66,65,166,80,215    Physical Exam:   General: NAD, A&O x 3  Neuro: non-focal   Cardiac: S1S2, RRR  Pulmonary: CTAB, no crackles or wheezing   Abdomen: soft, non-tender, non-distended, bowel sounds present  Extremities: no edema     HPI:  63 y/o F with a PMHx significant for T1DM (on insulin pump, with multiple admissions for DKA), HTN, HLD, former smoker, MI, CAD s/p PCI to RCA and brachytherapy in Aug 2017, dCHF (last TTE in Nov 2017 mild-mod MR, mild AS, mild-mod TR, EF 40-45%), chronic LLE pain and edema in setting of severe PVD s/p L & R fem-pop bypass graft with fem-pop bypass revisions, left subclavian vein stenosis s/p stent, ESRD on HD (Mon/Tu/Thr/Sat) via L AVF with oliguria, CVA with memory deficit, and depression who presents to the ED w/ a cc of n/v and diffuse abdominal pain of one days duration. Pt also endorsing flu-like symptoms 1 week prior to admission. Pt was found to be in DKA with VBG pH 7.2, HCO3 11, AG 43, BMP glucose 1049, and B-hydroxy butyrate >8. Pt also found to have serum K of 6.9, not hemolyzed, with peaked T waves in V2-V4. Pt was bolused 3L NS and given 2g calcium gluconate, albuterol x2, insulin 6 units x1, sodium bicarb 50meq x1, and subsequently started on an insulin gtt @1u/h. Pt was admitted to MICU for management of DKA. (13 Jan 2018 04:03)  Patient now with asymptomatic hypoglycemia.     Hypoglycemia  - Hypoglycemia protocol initiated by RN- Notified by RN in regards to hypoglycemia to 21 post treatment with Dextrose 50% injection 12.5 grams IV push and apple juice. As per RN patient mentating and asymptomatic with blood glucose of 21. Patient seen and evaluated at the bedside post treatment. Patient sitting in bed in no acute distress. Alert and oriented x 3. Endorses feeling cold and tired in a sense of sleepiness. Denies shakiness, confusion, nausea, vomiting, headache, diaphoresis, shortness of breath, chest pain, palpations, and abdominal pain.     Vital Signs Last 24 Hrs  T(C): 36.8 (15 Jah 2018 16:00), Max: 36.8 (15 Jah 2018 12:00)  T(F): 98.3 (15 Jah 2018 16:00), Max: 98.3 (15 Jah 2018 16:00)  HR: 73 (15 Jah 2018 21:40) (72 - 82)  BP: 143/89 (15 Jah 2018 21:40) (124/66 - 164/90)  BP(mean): 108 (15 Jah 2018 19:00) (89 - 120)  RR: 18 (15 Jah 2018 21:40) (14 - 23)  SpO2: 96% (15 Jah 2018 21:40) (93% - 100%)    FS: 21,23,95,66,65,166,80,215    Physical Exam:   General: NAD, A&O x 3  Neuro: non-focal   Cardiac: S1S2, RRR  Pulmonary: CTAB, no crackles or wheezing   Abdomen: soft, non-tender, non-distended, bowel sounds present  Extremities: no edema     HPI:  61 y/o F with a PMHx significant for T1DM (on insulin pump, with multiple admissions for DKA), HTN, HLD, former smoker, MI, CAD s/p PCI to RCA and brachytherapy in Aug 2017, dCHF (last TTE in Nov 2017 mild-mod MR, mild AS, mild-mod TR, EF 40-45%), chronic LLE pain and edema in setting of severe PVD s/p L & R fem-pop bypass graft with fem-pop bypass revisions, left subclavian vein stenosis s/p stent, ESRD on HD (Mon/Tu/Thr/Sat) via L AVF with oliguria, CVA with memory deficit, and depression who presents to the ED w/ a cc of n/v and diffuse abdominal pain of one days duration. Pt also endorsing flu-like symptoms 1 week prior to admission. Pt was found to be in DKA with VBG pH 7.2, HCO3 11, AG 43, BMP glucose 1049, and B-hydroxy butyrate >8. Pt also found to have serum K of 6.9, not hemolyzed, with peaked T waves in V2-V4. Pt was bolused 3L NS and given 2g calcium gluconate, albuterol x2, insulin 6 units x1, sodium bicarb 50meq x1, and subsequently started on an insulin gtt @1u/h. Pt was admitted to MICU for management of DKA. (13 Jan 2018 04:03)  Patient now with asymptomatic hypoglycemia.     Hypoglycemia  - Hypoglycemia protocol initiated by RN- medication initiated by RN above, additional 12.5 grams IV push D50 given when finger stick 80.   - Discussed case with Dr. Miguel, endocrinology- Believes that hypoglycemia is secondary to patient receiving NPH today. Recommendation is to disable basal rate insulin for the next 4 hours. Change finger stick to Notified by RN in regards to hypoglycemia to 21 post treatment with Dextrose 50% injection 12.5 grams IV push and apple juice. As per RN patient mentating and asymptomatic with blood glucose of 21. Patient seen and evaluated at the bedside post treatment. Patient sitting in bed in no acute distress. Alert and oriented x 3. Endorses feeling cold and tired in a sense of sleepiness. Denies shakiness, confusion, nausea, vomiting, headache, diaphoresis, shortness of breath, chest pain, palpations, and abdominal pain.     Vital Signs Last 24 Hrs  T(C): 36.8 (15 Jah 2018 16:00), Max: 36.8 (15 Jah 2018 12:00)  T(F): 98.3 (15 Jah 2018 16:00), Max: 98.3 (15 Jah 2018 16:00)  HR: 73 (15 Jah 2018 21:40) (72 - 82)  BP: 143/89 (15 Jah 2018 21:40) (124/66 - 164/90)  BP(mean): 108 (15 Jah 2018 19:00) (89 - 120)  RR: 18 (15 Jah 2018 21:40) (14 - 23)  SpO2: 96% (15 Jah 2018 21:40) (93% - 100%)    FS: 21,23,95,66,65,166,80,215    Physical Exam:   General: NAD, A&O x 3  Neuro: non-focal   Cardiac: S1S2, RRR  Pulmonary: CTAB, no crackles or wheezing   Abdomen: soft, non-tender, non-distended, bowel sounds present  Extremities: no edema     HPI:  63 y/o F with a PMHx significant for T1DM (on insulin pump, with multiple admissions for DKA), HTN, HLD, former smoker, MI, CAD s/p PCI to RCA and brachytherapy in Aug 2017, dCHF (last TTE in Nov 2017 mild-mod MR, mild AS, mild-mod TR, EF 40-45%), chronic LLE pain and edema in setting of severe PVD s/p L & R fem-pop bypass graft with fem-pop bypass revisions, left subclavian vein stenosis s/p stent, ESRD on HD (Mon/Tu/Thr/Sat) via L AVF with oliguria, CVA with memory deficit, and depression who presents to the ED w/ a cc of n/v and diffuse abdominal pain of one days duration. Pt also endorsing flu-like symptoms 1 week prior to admission. Pt was found to be in DKA with VBG pH 7.2, HCO3 11, AG 43, BMP glucose 1049, and B-hydroxy butyrate >8. Pt also found to have serum K of 6.9, not hemolyzed, with peaked T waves in V2-V4. Pt was bolused 3L NS and given 2g calcium gluconate, albuterol x2, insulin 6 units x1, sodium bicarb 50meq x1, and subsequently started on an insulin gtt @1u/h. Pt was admitted to MICU for management of DKA. (13 Jan 2018 04:03)  Patient now with asymptomatic hypoglycemia.     Hypoglycemia  - Hypoglycemia protocol initiated by RN- medication initiated by RN above, additional 12.5 grams IV push D50 given when finger stick 80.   - Discussed case with Dr. Miguel, endocrinology- Believes that hypoglycemia is secondary to patient receiving NPH today. Recommendation is to disable basal rate insulin for the next 4 hours. Change finger stick to every 4 hours. Recommendations followed.   - Will continue to check finger stick until 2 values greater than 100 post treatment.   - Will continue to monitor overnight and endorse to primary team.   - Attending and endocrinology service to follow up in the morning.     Tasneem Forman PA-C   [id number] Notified by RN in regards to hypoglycemia to 21 post treatment with Dextrose 50% injection 12.5 grams IV push and apple juice. As per RN patient mentating and asymptomatic with blood glucose of 21. Patient seen and evaluated at the bedside post treatment. Patient sitting in bed in no acute distress. Alert and oriented x 3. Endorses feeling cold and tired in a sense of sleepiness. Denies shakiness, confusion, nausea, vomiting, headache, diaphoresis, shortness of breath, chest pain, palpations, and abdominal pain.     Vital Signs Last 24 Hrs  T(C): 36.8 (15 Jah 2018 16:00), Max: 36.8 (15 Jah 2018 12:00)  T(F): 98.3 (15 Jah 2018 16:00), Max: 98.3 (15 Jah 2018 16:00)  HR: 73 (15 Jah 2018 21:40) (72 - 82)  BP: 143/89 (15 Jah 2018 21:40) (124/66 - 164/90)  BP(mean): 108 (15 Jah 2018 19:00) (89 - 120)  RR: 18 (15 Jah 2018 21:40) (14 - 23)  SpO2: 96% (15 Jah 2018 21:40) (93% - 100%)    FS: 21,23,95,66,65,166,80,215    Physical Exam:   General: NAD, A&O x 3  Neuro: non-focal   Cardiac: S1S2, RRR  Pulmonary: CTAB, no crackles or wheezing   Abdomen: soft, non-tender, non-distended, bowel sounds present  Extremities: no edema     HPI:  61 y/o F with a PMHx significant for T1DM (on insulin pump, with multiple admissions for DKA), HTN, HLD, former smoker, MI, CAD s/p PCI to RCA and brachytherapy in Aug 2017, dCHF (last TTE in Nov 2017 mild-mod MR, mild AS, mild-mod TR, EF 40-45%), chronic LLE pain and edema in setting of severe PVD s/p L & R fem-pop bypass graft with fem-pop bypass revisions, left subclavian vein stenosis s/p stent, ESRD on HD (Mon/Tu/Thr/Sat) via L AVF with oliguria, CVA with memory deficit, and depression who presents to the ED w/ a cc of n/v and diffuse abdominal pain of one days duration. Pt also endorsing flu-like symptoms 1 week prior to admission. Pt was found to be in DKA with VBG pH 7.2, HCO3 11, AG 43, BMP glucose 1049, and B-hydroxy butyrate >8. Pt also found to have serum K of 6.9, not hemolyzed, with peaked T waves in V2-V4. Pt was bolused 3L NS and given 2g calcium gluconate, albuterol x2, insulin 6 units x1, sodium bicarb 50meq x1, and subsequently started on an insulin gtt @1u/h. Pt was admitted to MICU for management of DKA. (13 Jan 2018 04:03)  Patient now with asymptomatic hypoglycemia.     Hypoglycemia  - Hypoglycemia protocol initiated by RN- medication initiated by RN above, additional 12.5 grams IV push D50 given when finger stick 80.   - Discussed case with Dr. Miguel, endocrinology- Believes that hypoglycemia is secondary to patient receiving NPH today. Recommendation is to disable basal rate insulin for the next 4 hours. Change finger stick to every 4 hours. Recommendations followed.   - Will continue to check finger stick until 2 values greater than 100 post treatment.   - Will continue to monitor overnight and endorse to primary team.   - Attending and endocrinology service to follow up in the morning.     Tasneem Forman PA-C   [id number]    Addendum 00:00  Finger stick 58  Rediscussed case with Dr. Miguel. Increase blood glucose checks to every 3 hours. Diable basal insulin rate for another 1 hour until 3 AM. Continue with finger stick checks and if blood glucose still low, disable pump an additional hour until 4 AM. Administer additional 12.5 grams IVP D50. Will continue to monitor.     Tasneem Forman PA-C Notified by RN in regards to hypoglycemia to 21 post treatment with Dextrose 50% injection 12.5 grams IV push and apple juice. As per RN patient mentating and asymptomatic with blood glucose of 21. Patient seen and evaluated at the bedside post treatment. Patient sitting in bed in no acute distress. Alert and oriented x 3. Endorses feeling cold and tired in a sense of sleepiness. Denies shakiness, confusion, nausea, vomiting, headache, diaphoresis, shortness of breath, chest pain, palpations, and abdominal pain.     Vital Signs Last 24 Hrs  T(C): 36.8 (15 Jah 2018 16:00), Max: 36.8 (15 Jah 2018 12:00)  T(F): 98.3 (15 Jah 2018 16:00), Max: 98.3 (15 Jah 2018 16:00)  HR: 73 (15 Jah 2018 21:40) (72 - 82)  BP: 143/89 (15 Jah 2018 21:40) (124/66 - 164/90)  BP(mean): 108 (15 Jah 2018 19:00) (89 - 120)  RR: 18 (15 Jah 2018 21:40) (14 - 23)  SpO2: 96% (15 Jah 2018 21:40) (93% - 100%)    FS: 21,23,95,66,65,166,80,215    Physical Exam:   General: NAD, A&O x 3  Neuro: non-focal   Cardiac: S1S2, RRR  Pulmonary: CTAB, no crackles or wheezing   Abdomen: soft, non-tender, non-distended, bowel sounds present  Extremities: no edema     HPI:  61 y/o F with a PMHx significant for T1DM (on insulin pump, with multiple admissions for DKA), HTN, HLD, former smoker, MI, CAD s/p PCI to RCA and brachytherapy in Aug 2017, dCHF (last TTE in Nov 2017 mild-mod MR, mild AS, mild-mod TR, EF 40-45%), chronic LLE pain and edema in setting of severe PVD s/p L & R fem-pop bypass graft with fem-pop bypass revisions, left subclavian vein stenosis s/p stent, ESRD on HD (Mon/Tu/Thr/Sat) via L AVF with oliguria, CVA with memory deficit, and depression who presents to the ED w/ a cc of n/v and diffuse abdominal pain of one days duration. Pt also endorsing flu-like symptoms 1 week prior to admission. Pt was found to be in DKA with VBG pH 7.2, HCO3 11, AG 43, BMP glucose 1049, and B-hydroxy butyrate >8. Pt also found to have serum K of 6.9, not hemolyzed, with peaked T waves in V2-V4. Pt was bolused 3L NS and given 2g calcium gluconate, albuterol x2, insulin 6 units x1, sodium bicarb 50meq x1, and subsequently started on an insulin gtt @1u/h. Pt was admitted to MICU for management of DKA. (13 Jan 2018 04:03)  Patient now with asymptomatic hypoglycemia.     Hypoglycemia  - Hypoglycemia protocol initiated by RN- medication initiated by RN above, additional 12.5 grams IV push D50 given when finger stick 80.   - Discussed case with Dr. Miguel, endocrinology- Believes that hypoglycemia is secondary to patient receiving NPH today. Recommendation is to disable basal rate insulin for the next 4 hours. Change finger stick to every 4 hours. Recommendations followed.   - Will continue to check finger stick until 2 values greater than 100 post treatment.   - Will continue to monitor overnight and endorse to primary team.   - Attending and endocrinology service to follow up in the morning.     Tasneem Forman PA-C   [id number]    Addendum 00:00  Finger stick 58  Rediscussed case with Dr. Miguel. Increase blood glucose checks to every 3 hours. Disable basal insulin rate for another 1 hour until 3 AM. Continue with finger stick checks and if blood glucose still low, disable pump an additional hour until 4 AM. Administer additional 12.5 grams IVP D50. Will continue to monitor.     Tasneem Forman PA-C    Addendum 03:00  Finger stick 2 AM 76  Finger stick 3 AM 78  Insulin pump disabled for 1 additional hour to restart basal dose at 4 AM    Tasneem Forman PA-C

## 2018-01-16 DIAGNOSIS — Z96.41 PRESENCE OF INSULIN PUMP (EXTERNAL) (INTERNAL): ICD-10-CM

## 2018-01-16 LAB
ALBUMIN SERPL ELPH-MCNC: 3.2 G/DL — LOW (ref 3.3–5)
ALP SERPL-CCNC: 167 U/L — HIGH (ref 40–120)
ALT FLD-CCNC: 79 U/L — HIGH (ref 10–45)
ANION GAP SERPL CALC-SCNC: 18 MMOL/L — HIGH (ref 5–17)
APTT BLD: 26.4 SEC — LOW (ref 27.5–37.4)
AST SERPL-CCNC: 114 U/L — HIGH (ref 10–40)
BASOPHILS # BLD AUTO: 0.01 K/UL — SIGNIFICANT CHANGE UP (ref 0–0.2)
BASOPHILS NFR BLD AUTO: 0.2 % — SIGNIFICANT CHANGE UP (ref 0–2)
BILIRUB SERPL-MCNC: 0.4 MG/DL — SIGNIFICANT CHANGE UP (ref 0.2–1.2)
BUN SERPL-MCNC: 35 MG/DL — HIGH (ref 7–23)
CALCIUM SERPL-MCNC: 7.4 MG/DL — LOW (ref 8.4–10.5)
CHLORIDE SERPL-SCNC: 93 MMOL/L — LOW (ref 96–108)
CO2 SERPL-SCNC: 25 MMOL/L — SIGNIFICANT CHANGE UP (ref 22–31)
CREAT SERPL-MCNC: 6.07 MG/DL — HIGH (ref 0.5–1.3)
EOSINOPHIL # BLD AUTO: 0.05 K/UL — SIGNIFICANT CHANGE UP (ref 0–0.5)
EOSINOPHIL NFR BLD AUTO: 1 % — SIGNIFICANT CHANGE UP (ref 0–6)
GLUCOSE BLDC GLUCOMTR-MCNC: 100 MG/DL — HIGH (ref 70–99)
GLUCOSE BLDC GLUCOMTR-MCNC: 105 MG/DL — HIGH (ref 70–99)
GLUCOSE BLDC GLUCOMTR-MCNC: 106 MG/DL — HIGH (ref 70–99)
GLUCOSE BLDC GLUCOMTR-MCNC: 119 MG/DL — HIGH (ref 70–99)
GLUCOSE BLDC GLUCOMTR-MCNC: 183 MG/DL — HIGH (ref 70–99)
GLUCOSE BLDC GLUCOMTR-MCNC: 76 MG/DL — SIGNIFICANT CHANGE UP (ref 70–99)
GLUCOSE BLDC GLUCOMTR-MCNC: 78 MG/DL — SIGNIFICANT CHANGE UP (ref 70–99)
GLUCOSE BLDC GLUCOMTR-MCNC: 79 MG/DL — SIGNIFICANT CHANGE UP (ref 70–99)
GLUCOSE BLDC GLUCOMTR-MCNC: 84 MG/DL — SIGNIFICANT CHANGE UP (ref 70–99)
GLUCOSE BLDC GLUCOMTR-MCNC: 94 MG/DL — SIGNIFICANT CHANGE UP (ref 70–99)
GLUCOSE SERPL-MCNC: 69 MG/DL — LOW (ref 70–99)
HCT VFR BLD CALC: 22.8 % — LOW (ref 34.5–45)
HGB BLD-MCNC: 7.3 G/DL — LOW (ref 11.5–15.5)
IMM GRANULOCYTES NFR BLD AUTO: 0 % — SIGNIFICANT CHANGE UP (ref 0–1.5)
INR BLD: 1.04 RATIO — SIGNIFICANT CHANGE UP (ref 0.88–1.16)
LYMPHOCYTES # BLD AUTO: 0.76 K/UL — LOW (ref 1–3.3)
LYMPHOCYTES # BLD AUTO: 15.2 % — SIGNIFICANT CHANGE UP (ref 13–44)
MAGNESIUM SERPL-MCNC: 2.1 MG/DL — SIGNIFICANT CHANGE UP (ref 1.6–2.6)
MCHC RBC-ENTMCNC: 32 GM/DL — SIGNIFICANT CHANGE UP (ref 32–36)
MCHC RBC-ENTMCNC: 32.2 PG — SIGNIFICANT CHANGE UP (ref 27–34)
MCV RBC AUTO: 100.4 FL — HIGH (ref 80–100)
MONOCYTES # BLD AUTO: 0.47 K/UL — SIGNIFICANT CHANGE UP (ref 0–0.9)
MONOCYTES NFR BLD AUTO: 9.4 % — SIGNIFICANT CHANGE UP (ref 2–14)
NEUTROPHILS # BLD AUTO: 3.71 K/UL — SIGNIFICANT CHANGE UP (ref 1.8–7.4)
NEUTROPHILS NFR BLD AUTO: 74.2 % — SIGNIFICANT CHANGE UP (ref 43–77)
PHOSPHATE SERPL-MCNC: 4.1 MG/DL — SIGNIFICANT CHANGE UP (ref 2.5–4.5)
PLATELET # BLD AUTO: 269 K/UL — SIGNIFICANT CHANGE UP (ref 150–400)
POTASSIUM SERPL-MCNC: 4.3 MMOL/L — SIGNIFICANT CHANGE UP (ref 3.5–5.3)
POTASSIUM SERPL-SCNC: 4.3 MMOL/L — SIGNIFICANT CHANGE UP (ref 3.5–5.3)
PROT SERPL-MCNC: 5.9 G/DL — LOW (ref 6–8.3)
PROTHROM AB SERPL-ACNC: 11.8 SEC — SIGNIFICANT CHANGE UP (ref 10–13.1)
RBC # BLD: 2.27 M/UL — LOW (ref 3.8–5.2)
RBC # FLD: 13.3 % — SIGNIFICANT CHANGE UP (ref 10.3–14.5)
SODIUM SERPL-SCNC: 136 MMOL/L — SIGNIFICANT CHANGE UP (ref 135–145)
WBC # BLD: 5 K/UL — SIGNIFICANT CHANGE UP (ref 3.8–10.5)
WBC # FLD AUTO: 5 K/UL — SIGNIFICANT CHANGE UP (ref 3.8–10.5)

## 2018-01-16 PROCEDURE — 12345: CPT | Mod: NC

## 2018-01-16 PROCEDURE — 99233 SBSQ HOSP IP/OBS HIGH 50: CPT

## 2018-01-16 RX ORDER — ERYTHROPOIETIN 10000 [IU]/ML
10000 INJECTION, SOLUTION INTRAVENOUS; SUBCUTANEOUS ONCE
Qty: 0 | Refills: 0 | Status: COMPLETED | OUTPATIENT
Start: 2018-01-16 | End: 2018-01-16

## 2018-01-16 RX ORDER — DEXTROSE 50 % IN WATER 50 %
12.5 SYRINGE (ML) INTRAVENOUS ONCE
Qty: 0 | Refills: 0 | Status: COMPLETED | OUTPATIENT
Start: 2018-01-16 | End: 2018-01-16

## 2018-01-16 RX ADMIN — ERYTHROPOIETIN 10000 UNIT(S): 10000 INJECTION, SOLUTION INTRAVENOUS; SUBCUTANEOUS at 15:57

## 2018-01-16 RX ADMIN — Medication 81 MILLIGRAM(S): at 17:13

## 2018-01-16 RX ADMIN — ATORVASTATIN CALCIUM 20 MILLIGRAM(S): 80 TABLET, FILM COATED ORAL at 22:49

## 2018-01-16 RX ADMIN — Medication 25 MILLIGRAM(S): at 05:07

## 2018-01-16 RX ADMIN — OXYCODONE AND ACETAMINOPHEN 1 TABLET(S): 5; 325 TABLET ORAL at 22:49

## 2018-01-16 RX ADMIN — CLOPIDOGREL BISULFATE 75 MILLIGRAM(S): 75 TABLET, FILM COATED ORAL at 22:49

## 2018-01-16 RX ADMIN — Medication 12.5 GRAM(S): at 00:12

## 2018-01-16 RX ADMIN — OXYCODONE AND ACETAMINOPHEN 1 TABLET(S): 5; 325 TABLET ORAL at 05:37

## 2018-01-16 RX ADMIN — LISINOPRIL 40 MILLIGRAM(S): 2.5 TABLET ORAL at 05:59

## 2018-01-16 RX ADMIN — CINACALCET 60 MILLIGRAM(S): 30 TABLET, FILM COATED ORAL at 17:12

## 2018-01-16 RX ADMIN — OXYCODONE AND ACETAMINOPHEN 1 TABLET(S): 5; 325 TABLET ORAL at 05:07

## 2018-01-16 RX ADMIN — Medication 50 MILLIGRAM(S): at 05:07

## 2018-01-16 RX ADMIN — Medication 25 MILLIGRAM(S): at 17:13

## 2018-01-16 RX ADMIN — Medication 50 MILLIGRAM(S): at 22:49

## 2018-01-16 RX ADMIN — DULOXETINE HYDROCHLORIDE 60 MILLIGRAM(S): 30 CAPSULE, DELAYED RELEASE ORAL at 17:13

## 2018-01-16 NOTE — PROGRESS NOTE ADULT - SUBJECTIVE AND OBJECTIVE BOX
[facility]   [phone]  RENAL FOLLOW UP NOTE  --------------------------------------------------------------------------------  Chief Complaint:    24 hour events/subjective:    c/o fatigue nausea has improved drastically per pt     PAST HISTORY  --------------------------------------------------------------------------------  No significant changes to PMH, PSH, FHx, SHx, unless otherwise noted    ALLERGIES & MEDICATIONS  --------------------------------------------------------------------------------  Allergies    No Known Allergies    Intolerances      Standing Inpatient Medications  aspirin enteric coated 81 milliGRAM(s) Oral daily  atorvastatin 20 milliGRAM(s) Oral at bedtime  cinacalcet 60 milliGRAM(s) Oral daily  clopidogrel Tablet 75 milliGRAM(s) Oral at bedtime  dextrose 5%. 1000 milliLiter(s) IV Continuous <Continuous>  dextrose 50% Injectable 12.5 Gram(s) IV Push once  dextrose 50% Injectable 25 Gram(s) IV Push once  dextrose 50% Injectable 25 Gram(s) IV Push once  DULoxetine 60 milliGRAM(s) Oral every other day  heparin  Injectable 5000 Unit(s) SubCutaneous every 8 hours  hydrALAZINE 50 milliGRAM(s) Oral every 8 hours  insulin lispro (HumaLOG) corrective regimen sliding scale   SubCutaneous three times a day before meals  insulin lispro (HumaLOG) Pump 1 Each SubCutaneous Continuous Pump  lisinopril 40 milliGRAM(s) Oral daily  metoprolol     tartrate 25 milliGRAM(s) Oral two times a day    PRN Inpatient Medications  dextrose Gel 1 Dose(s) Oral once PRN  glucagon  Injectable 1 milliGRAM(s) IntraMuscular once PRN  oxyCODONE    5 mG/acetaminophen 325 mG 1 Tablet(s) Oral every 6 hours PRN      REVIEW OF SYSTEMS  --------------------------------------------------------------------------------    Gen: + fatigue,- fevers/chills,  CVS: denies chest pain/palpitations  Resp: denies SOB/Cough  GI: Denies V/Abd pain + N  : Denies dysuria  All other systems were reviewed and are negative, except as noted.    VITALS/PHYSICAL EXAM  --------------------------------------------------------------------------------  T(C): 36.5 (01-16-18 @ 04:46), Max: 36.8 (01-15-18 @ 12:00)  HR: 79 (01-16-18 @ 04:46) (68 - 82)  BP: 117/76 (01-16-18 @ 04:46) (117/76 - 164/90)  RR: 18 (01-16-18 @ 04:46) (14 - 23)  SpO2: 99% (01-16-18 @ 04:46) (93% - 100%)  Wt(kg): --        01-15-18 @ 07:01  -  01-16-18 @ 07:00  --------------------------------------------------------  IN: 740 mL / OUT: 0 mL / NET: 740 mL          Physical Exam:  	Gen: alert oriented    	Pulm: Decreased breath sounds b/l bases. no rales or ronchi or wheezing  	CV: RRR, S1/S2. no rub  	Abd: +BS, soft, nontender/nondistended  	: No suprapubic tenderness.               Extremity: No cyanosis, no edema no clubbing  	  LABS/STUDIES  --------------------------------------------------------------------------------              7.8    6.3   >-----------<  271      [01-15-18 @ 02:48]              22.8     138  |  97  |  26  ----------------------------<  153      [01-15-18 @ 02:48]  4.0   |  29  |  4.64        Ca     7.1     [01-15-18 @ 02:48]      Mg     2.0     [01-15-18 @ 02:48]      Phos  3.9     [01-15-18 @ 02:48]    TPro  6.1  /  Alb  3.2  /  TBili  0.2  /  DBili  x   /  AST  82  /  ALT  58  /  AlkPhos  152  [01-15-18 @ 02:48]        Troponin 2.88      [01-14-18 @ 16:44]        [01-14-18 @ 16:44]    Creatinine Trend:  SCr 4.64 [01-15 @ 02:48]  SCr 4.08 [01-14 @ 14:48]  SCr 3.74 [01-14 @ 08:33]  SCr 3.32 [01-14 @ 04:24]  SCr 2.91 [01-14 @ 00:20]                  HbA1c 8.5      [11-25-17 @ 04:12]  TSH 1.07      [12-19-17 @ 06:12]

## 2018-01-16 NOTE — PROGRESS NOTE ADULT - ASSESSMENT
62 f with  Diabetes type 1/DKA- continue Insulin coverage, Restart insulin pump. Endocrine follow  ESRD- HD  CAD- stable.  HTN control  Abdominal pain resolved- observe closely  Anemia- follow Hct.  DCP home in am if stable.  Pierce Viera MD pager [phone]

## 2018-01-16 NOTE — PROGRESS NOTE ADULT - PROBLEM SELECTOR PLAN 2
-Due to change insulin pump site 1/18/18  -Continue present insulin pump settings as noted above.  -If hypoglycemic contact endo team  --Plan discussed with pt/team.  Contact info: [phone] (24/7). pager [phone]

## 2018-01-16 NOTE — PROGRESS NOTE ADULT - PROBLEM SELECTOR PLAN 1
-test BG AC/HS/2AM. IF further hypoglycemia might need a temp basal on insulin pump (contact endo team for recs)   -Might continue use of insulin pump for now.    -Pt/team in agreement, will transition back to insulin pump in AM as pt too lethargic to manage at this time  -discussed plan w/pt and team  pager: [phone] -test BG AC/HS/2AM. IF further hypoglycemia might need a temp basal on insulin pump (contact endo team for recs)   -Might continue use of insulin pump for now.

## 2018-01-16 NOTE — PROGRESS NOTE ADULT - SUBJECTIVE AND OBJECTIVE BOX
Diabetes Follow up note: Saw pt earlier today  Interval Hx: 62 year old female w/uncontrolled T1DM w/complications/comorbidities (managed on insulin pump at home) w/ESRD here w/DKA in setting of "stomach virus" as per pt. Pt received NPH  5 units yesterday at mid-day and then insulin pump was restarted in the afternoon at 100% basal doses causing overnight hypoglycemia and requiring D50 IVP and insulin pump temp basal dose of 0% until this am. BGs improved today. Pt off sq insulin and only using her insulin pump. Reports tolerating POs.       Review of Systems:  General: "feeling better"  GI: Denies n/v/abd pain at time of visit.   CV: No CP/SOB  ENDO: No S&Sx of hypoglycemia      MEDS:  atorvastatin 20 milliGRAM(s) Oral at bedtime  insulin lispro (HumaLOG) Pump 1 Each SubCutaneous Continuous Pump with the following settings:  Basal  12am – 0.275 units/hour  5am – 0.375 units/hour  Total daily basal – 8.5 units    ICR  12am-12am – 1:15    ISF  12am – 60  7am – 40  6pm – 60    BGT  12am – 110-130 mg/dl   8am – 100-120 mg/dl     Insulin duration: 6 hours       Allergies    No Known Allergies        PE:  General: Female lying in bed. NAD.   Vital Signs Last 24 Hrs  T(C): 36.7 (01-16-18 @ 17:08), Max: 37 (01-16-18 @ 12:20)  T(F): 98 (01-16-18 @ 17:08), Max: 98.6 (01-16-18 @ 12:20)  HR: 73 (01-16-18 @ 17:08) (68 - 80)  BP: 166/83 (01-16-18 @ 17:08) (116/60 - 166/83)  BP(mean): 108 (01-15-18 @ 19:00) (101 - 108)  RR: 18 (01-16-18 @ 17:08) (16 - 23)  SpO2: 94% (01-16-18 @ 17:08) (94% - 99%)  Abd: Soft, NT, ND. R quadrant insulin pump site in place D&I   Extremities: Warm, no edema noted in all 4 exts  Neuro: A&Ox3    LABS:  POCT Blood Glucose.: 106 mg/dL (01-16-18 @ 11:36)  POCT Blood Glucose.: 100 mg/dL (01-16-18 @ 08:09)  POCT Blood Glucose.: 84 mg/dL (01-16-18 @ 06:05)  POCT Blood Glucose.: 79 mg/dL (01-16-18 @ 05:04)  POCT Blood Glucose.: 78 mg/dL (01-16-18 @ 02:56)  POCT Blood Glucose.: 76 mg/dL (01-16-18 @ 02:02)  POCT Blood Glucose.: 105 mg/dL (01-16-18 @ 00:46)  POCT Blood Glucose.: 119 mg/dL (01-16-18 @ 00:27)  POCT Blood Glucose.: 58 mg/dL (01-15-18 @ 23:56)  POCT Blood Glucose.: 215 mg/dL (01-15-18 @ 22:47)  POCT Blood Glucose.: 80 mg/dL (01-15-18 @ 22:28)  POCT Blood Glucose.: 166 mg/dL (01-15-18 @ 22:05)  POCT Blood Glucose.: 65 mg/dL (01-15-18 @ 21:51)  POCT Blood Glucose.: 66 mg/dL (01-15-18 @ 21:29)  POCT Blood Glucose.: 95 mg/dL (01-15-18 @ 21:16)  POCT Blood Glucose.: 23 mg/dL (01-15-18 @ 21:02)  POCT Blood Glucose.: 21 mg/dL (01-15-18 @ 21:00)  POCT Blood Glucose.: 29 mg/dL (01-15-18 @ 20:59)  POCT Blood Glucose.: 91 mg/dL (01-15-18 @ 18:20)  POCT Blood Glucose.: 168 mg/dL (01-15-18 @ 13:36)  POCT Blood Glucose.: 136 mg/dL (01-15-18 @ 09:30)  POCT Blood Glucose.: 173 mg/dL (01-14-18 @ 22:08)  POCT Blood Glucose.: 124 mg/dL (01-14-18 @ 18:15)                            7.3    5.00  )-----------( 269      ( 16 Jan 2018 08:56 )             22.8     01-16    136  |  93<L>  |  35<H>  ----------------------------<  69<L>  4.3   |  25  |  6.07<H>    Ca    7.4<L>      16 Jan 2018 09:07  Phos  4.1     01-16  Mg     2.1     01-16    TPro  5.9<L>  /  Alb  3.2<L>  /  TBili  0.4  /  DBili  x   /  AST  114<H>  /  ALT  79<H>  /  AlkPhos  167<H>  01-16      Thyroid Function Tests:  12-19 @ 06:12 TSH 1.07 FreeT4 -- T3 -- Anti TPO -- Anti Thyroglobulin Ab -- TSI --      Hemoglobin A1C, Whole Blood: 8.5 % <H> [4.0 - 5.6] (11-25-17 @ 04:12)

## 2018-01-16 NOTE — PROGRESS NOTE ADULT - ASSESSMENT
61 y/o F w/ uncontrolled Type 1 DM w/ ESRD on HD neuropathy, retinopathy, macrovascular complications of CAD on insulin pump at home with hyperglycemia now admitted with DKA (resolved). Tolerating POs today. Overnight hypoglycemia due to NPH insulin given yesterday and adding insulin pump in pm. Pt required D50 IVP treatments overnight as well as zero temp basal until this am to prevent further hypoglycemia. BG improved and pt feeling better.

## 2018-01-16 NOTE — PROGRESS NOTE ADULT - ASSESSMENT
imp/suggest: ESRD      Hemodialysis Prescription:  	Access: avf   	Dialyzer: revaclear 300  	Blood Flow (mL/Min): 400  	Dialysate Flow (mL/Min): 600  	Target UF (Liters): 1.5 liter max  	Treatment Time: 210 m  	Potassium: 2k   	Calcium: 2.5  	  YOLANDA  epogen 02647 units iv     Vitamin D     continue with hd   see hd flow sheet

## 2018-01-16 NOTE — ADVANCED PRACTICE NURSE CONSULT - RECOMMEDATIONS
Reviewed S/S, prevention and appropriate treatment of hypo-hyperglycemia reviewed with pt and verbalized understanding obtained via the teach-back method.  Primary RN to f/u with BG monitoring, making sure pt consumes carb consistent meals, insulin pump site assessment, monitoring S/S hypo/hyperglycemia and tracking carb intake, meal/correction boluses.

## 2018-01-16 NOTE — PROVIDER CONTACT NOTE (OTHER) - ASSESSMENT
fs 21, protocol initiated d50% given and repeat amount is 95. pt has home pump in place with a basal rate going.

## 2018-01-16 NOTE — ADVANCED PRACTICE NURSE CONSULT - ASSESSMENT
61 y/o female with uncontrolled Type 1 DM who presented w/ hyperglycemia a/w DKA on insulin pump.  T1DM Complicated by nephropathy with ESRD on HD, neuropathy, retinopathy, macrovascular complications of CAD. A1c 8.5%. On [employer] insulin pump.  Also with hx of HTN, HLD, former smoker, MI, CAD s/p PCI to RCA and brachytherapy in Aug 2017, dCHF (last TTE in Nov 2017 mild-mod MR, mild AS, mild-mod TR, EF 40-45%), chronic LLE pain and edema in setting of severe PVD s/p L & R fem-pop bypass graft with fem-pop bypass revisions, left subclavian vein stenosis s/p stent, ESRD on HD (Mon/Tu/Thr/Sat) via L AVF with oliguria, CVA with memory deficit, and depression who presents to the ED w/ a cc of n/v and diffuse abdominal pain of one days duration. Pt also endorsing flu-like symptoms 1 week prior to admission. Pt was found to be in DKA with VBG pH 7.2, HCO3 11, AG 43, BMP glucose 1049, and B-hydroxy butyrate >8. Pt also found to have serum K of 6.9, not hemolyzed, with peaked T waves in V2-V4. Pt was bolused 3L NS and given 2g calcium gluconate, albuterol x2, insulin 6 units x1, sodium bicarb 50meq x1, and subsequently started on an insulin gtt @1u/h. Pt was admitted to MICU for management of DKA. Pt reports she has a "stomach bug" and strep throat that the last time she vomited was yesterday. Hyperglycemia and DKA possibly related to recent strep infection.  Insulin pump forms all completed and in chart.  Pt has adequate amount of insulin pump supplies.  Pt last changed her site yesterday and due to change her site 1/18/18.  Insulin pump site on right abdomen and remains clean, dry, intact. Patient was given NPH 5u x 1 at 1:45 PM yesterday and placed back on her insulin pump at 5 PM. Patient became hypoglycemic at 9 PM down to FS 21. Patient given D50 and FS went up to 95. Then 15 min later she dropped back down to 66. Gave juice and FS went back up to 166.  Enocinr Fellow had team to do temp basal 0% for 4 hours until NPH wears off and told team to check FS q4 hours. Pt then dropped to 80, given another amp of D50 x1, then 1 hour later dropped again to 58. Endocrine fellow told team to give another amp of D50 x1, will closely monitor as NPH wears off. Told team to check FS q3h, will delay initiation of pump by another 1-2 hours depending if she keeps dropping.  Pt’s fbs at 8am 100 mg/dl and pt denies s/s hypoglycemia.   Pt’s insulin pump settings as follows:       Basal  12am – 0.275 units/hour  5am – 0.375 units/hour  Total daily basal – 8.5 units    ICR  12am-12am – 1:15    ISF  12am – 60  7am – 40  6pm – 60    BGT  12am – 110-130 mg/dl   8am – 100-120 mg/dl     Insulin duration: 6 hours

## 2018-01-16 NOTE — PROGRESS NOTE ADULT - SUBJECTIVE AND OBJECTIVE BOX
Patient is a 62y old  Female who presents with a chief complaint of Nausea, Weakness, Diffuse abdominal pain (13 Jan 2018 04:03)      SUBJECTIVE / OVERNIGHT EVENTS: Comfortable without new complaints.   Review of Systems  chest pain no  palpitations no  sob no  nausea no  headache no    MEDICATIONS  (STANDING):  aspirin enteric coated 81 milliGRAM(s) Oral daily  atorvastatin 20 milliGRAM(s) Oral at bedtime  cinacalcet 60 milliGRAM(s) Oral daily  clopidogrel Tablet 75 milliGRAM(s) Oral at bedtime  dextrose 5%. 1000 milliLiter(s) (50 mL/Hr) IV Continuous <Continuous>  dextrose 50% Injectable 12.5 Gram(s) IV Push once  dextrose 50% Injectable 25 Gram(s) IV Push once  dextrose 50% Injectable 25 Gram(s) IV Push once  DULoxetine 60 milliGRAM(s) Oral every other day  epoetin azalea Injectable 58788 Unit(s) IV Push once  heparin  Injectable 5000 Unit(s) SubCutaneous every 8 hours  hydrALAZINE 50 milliGRAM(s) Oral every 8 hours  insulin lispro (HumaLOG) Pump 1 Each SubCutaneous Continuous Pump  lisinopril 40 milliGRAM(s) Oral daily  metoprolol     tartrate 25 milliGRAM(s) Oral two times a day    MEDICATIONS  (PRN):  dextrose Gel 1 Dose(s) Oral once PRN Blood Glucose LESS THAN 70 milliGRAM(s)/deciLiter  glucagon  Injectable 1 milliGRAM(s) IntraMuscular once PRN Glucose <70 milliGRAM(s)/deciLiter  oxyCODONE    5 mG/acetaminophen 325 mG 1 Tablet(s) Oral every 6 hours PRN Moderate to Severe Pain          PHYSICAL EXAM:  GENERAL: NAD, well-developed  HEAD:  Atraumatic, Normocephalic  EYES: EOMI, PERRLA, conjunctiva and sclera clear  NECK: Supple, No JVD  CHEST/LUNG: Clear to auscultation bilaterally; No wheeze  HEART: Regular rate and rhythm; No murmurs, rubs, or gallops  ABDOMEN: Soft, Nontender, Nondistended; Bowel sounds present  EXTREMITIES:  2+ Peripheral Pulses, No clubbing, cyanosis, or edema  PSYCH: AAOx3  NEUROLOGY: non-focal  SKIN: No rashes or lesions    LABS:                        7.3    5.00  )-----------( 269      ( 16 Jan 2018 08:56 )             22.8     01-16    136  |  93<L>  |  35<H>  ----------------------------<  69<L>  4.3   |  25  |  6.07<H>    Ca    7.4<L>      16 Jan 2018 09:07  Phos  4.1     01-16  Mg     2.1     01-16    TPro  5.9<L>  /  Alb  3.2<L>  /  TBili  0.4  /  DBili  x   /  AST  114<H>  /  ALT  79<H>  /  AlkPhos  167<H>  01-16    PT/INR - ( 16 Jan 2018 08:56 )   PT: 11.8 sec;   INR: 1.04 ratio         PTT - ( 16 Jan 2018 08:56 )  PTT:26.4 sec  CARDIAC MARKERS ( 14 Jan 2018 16:44 )  x     / 2.88 ng/mL / 190 U/L / x     / 7.9 ng/mL          RADIOLOGY & ADDITIONAL TESTS:    Imaging Personally Reviewed:    Consultant(s) Notes Reviewed:      Care Discussed with Consultants/Other Providers:

## 2018-01-17 ENCOUNTER — TRANSCRIPTION ENCOUNTER (OUTPATIENT)
Age: 63
End: 2018-01-17

## 2018-01-17 VITALS — WEIGHT: 113.32 LBS

## 2018-01-17 LAB
GLUCOSE BLDC GLUCOMTR-MCNC: 104 MG/DL — HIGH (ref 70–99)
GLUCOSE BLDC GLUCOMTR-MCNC: 105 MG/DL — HIGH (ref 70–99)
GLUCOSE BLDC GLUCOMTR-MCNC: 99 MG/DL — SIGNIFICANT CHANGE UP (ref 70–99)
HBV SURFACE AB SER-ACNC: <3 MIU/ML — LOW
HBV SURFACE AG SER-ACNC: SIGNIFICANT CHANGE UP
HCT VFR BLD CALC: 24 % — LOW (ref 34.5–45)
HCV AB S/CO SERPL IA: 0.13 S/CO — SIGNIFICANT CHANGE UP
HCV AB SERPL-IMP: SIGNIFICANT CHANGE UP
HGB BLD-MCNC: 7.3 G/DL — LOW (ref 11.5–15.5)
MCHC RBC-ENTMCNC: 30.4 GM/DL — LOW (ref 32–36)
MCHC RBC-ENTMCNC: 30.9 PG — SIGNIFICANT CHANGE UP (ref 27–34)
MCV RBC AUTO: 101.7 FL — HIGH (ref 80–100)
PLATELET # BLD AUTO: 272 K/UL — SIGNIFICANT CHANGE UP (ref 150–400)
RBC # BLD: 2.36 M/UL — LOW (ref 3.8–5.2)
RBC # FLD: 13.5 % — SIGNIFICANT CHANGE UP (ref 10.3–14.5)
WBC # BLD: 4.78 K/UL — SIGNIFICANT CHANGE UP (ref 3.8–10.5)
WBC # FLD AUTO: 4.78 K/UL — SIGNIFICANT CHANGE UP (ref 3.8–10.5)

## 2018-01-17 PROCEDURE — 82947 ASSAY GLUCOSE BLOOD QUANT: CPT

## 2018-01-17 PROCEDURE — 86803 HEPATITIS C AB TEST: CPT

## 2018-01-17 PROCEDURE — 74177 CT ABD & PELVIS W/CONTRAST: CPT

## 2018-01-17 PROCEDURE — 83735 ASSAY OF MAGNESIUM: CPT

## 2018-01-17 PROCEDURE — 82565 ASSAY OF CREATININE: CPT

## 2018-01-17 PROCEDURE — 99232 SBSQ HOSP IP/OBS MODERATE 35: CPT

## 2018-01-17 PROCEDURE — 83605 ASSAY OF LACTIC ACID: CPT

## 2018-01-17 PROCEDURE — 82550 ASSAY OF CK (CPK): CPT

## 2018-01-17 PROCEDURE — 86706 HEP B SURFACE ANTIBODY: CPT

## 2018-01-17 PROCEDURE — 84484 ASSAY OF TROPONIN QUANT: CPT

## 2018-01-17 PROCEDURE — 85027 COMPLETE CBC AUTOMATED: CPT

## 2018-01-17 PROCEDURE — 87581 M.PNEUMON DNA AMP PROBE: CPT

## 2018-01-17 PROCEDURE — 80053 COMPREHEN METABOLIC PANEL: CPT

## 2018-01-17 PROCEDURE — 82553 CREATINE MB FRACTION: CPT

## 2018-01-17 PROCEDURE — 71045 X-RAY EXAM CHEST 1 VIEW: CPT

## 2018-01-17 PROCEDURE — 85014 HEMATOCRIT: CPT

## 2018-01-17 PROCEDURE — 82803 BLOOD GASES ANY COMBINATION: CPT

## 2018-01-17 PROCEDURE — 85730 THROMBOPLASTIN TIME PARTIAL: CPT

## 2018-01-17 PROCEDURE — 93005 ELECTROCARDIOGRAM TRACING: CPT

## 2018-01-17 PROCEDURE — 71250 CT THORAX DX C-: CPT

## 2018-01-17 PROCEDURE — 94640 AIRWAY INHALATION TREATMENT: CPT

## 2018-01-17 PROCEDURE — 87486 CHLMYD PNEUM DNA AMP PROBE: CPT

## 2018-01-17 PROCEDURE — 85610 PROTHROMBIN TIME: CPT

## 2018-01-17 PROCEDURE — 84295 ASSAY OF SERUM SODIUM: CPT

## 2018-01-17 PROCEDURE — 96375 TX/PRO/DX INJ NEW DRUG ADDON: CPT

## 2018-01-17 PROCEDURE — 82962 GLUCOSE BLOOD TEST: CPT

## 2018-01-17 PROCEDURE — 84132 ASSAY OF SERUM POTASSIUM: CPT

## 2018-01-17 PROCEDURE — 87040 BLOOD CULTURE FOR BACTERIA: CPT

## 2018-01-17 PROCEDURE — 82435 ASSAY OF BLOOD CHLORIDE: CPT

## 2018-01-17 PROCEDURE — 84100 ASSAY OF PHOSPHORUS: CPT

## 2018-01-17 PROCEDURE — 87633 RESP VIRUS 12-25 TARGETS: CPT

## 2018-01-17 PROCEDURE — 87798 DETECT AGENT NOS DNA AMP: CPT

## 2018-01-17 PROCEDURE — 80048 BASIC METABOLIC PNL TOTAL CA: CPT

## 2018-01-17 PROCEDURE — 99285 EMERGENCY DEPT VISIT HI MDM: CPT | Mod: 25

## 2018-01-17 PROCEDURE — 87340 HEPATITIS B SURFACE AG IA: CPT

## 2018-01-17 PROCEDURE — 96374 THER/PROPH/DIAG INJ IV PUSH: CPT | Mod: XU

## 2018-01-17 PROCEDURE — 82010 KETONE BODYS QUAN: CPT

## 2018-01-17 PROCEDURE — 82009 KETONE BODYS QUAL: CPT

## 2018-01-17 PROCEDURE — 99261: CPT

## 2018-01-17 PROCEDURE — 82330 ASSAY OF CALCIUM: CPT

## 2018-01-17 RX ORDER — SEVELAMER CARBONATE 2400 MG/1
2 POWDER, FOR SUSPENSION ORAL
Qty: 0 | Refills: 0 | COMMUNITY

## 2018-01-17 RX ORDER — INSULIN LISPRO 100/ML
1 VIAL (ML) SUBCUTANEOUS
Qty: 0 | Refills: 0 | COMMUNITY
Start: 2018-01-17

## 2018-01-17 RX ORDER — INSULIN LISPRO 100/ML
0 VIAL (ML) SUBCUTANEOUS
Qty: 0 | Refills: 0 | COMMUNITY
Start: 2018-01-17

## 2018-01-17 RX ORDER — INSULIN LISPRO 100/ML
150 VIAL (ML) SUBCUTANEOUS
Qty: 0 | Refills: 0 | COMMUNITY

## 2018-01-17 RX ORDER — HYDRALAZINE HCL 50 MG
1 TABLET ORAL
Qty: 0 | Refills: 0 | COMMUNITY
Start: 2018-01-17

## 2018-01-17 RX ORDER — HYDRALAZINE HCL 50 MG
4 TABLET ORAL
Qty: 0 | Refills: 0 | COMMUNITY

## 2018-01-17 RX ADMIN — Medication 25 MILLIGRAM(S): at 05:17

## 2018-01-17 RX ADMIN — Medication 50 MILLIGRAM(S): at 05:17

## 2018-01-17 RX ADMIN — LISINOPRIL 40 MILLIGRAM(S): 2.5 TABLET ORAL at 05:17

## 2018-01-17 RX ADMIN — CINACALCET 60 MILLIGRAM(S): 30 TABLET, FILM COATED ORAL at 12:21

## 2018-01-17 RX ADMIN — Medication 81 MILLIGRAM(S): at 12:21

## 2018-01-17 RX ADMIN — Medication 25 MILLIGRAM(S): at 17:19

## 2018-01-17 RX ADMIN — Medication 50 MILLIGRAM(S): at 12:21

## 2018-01-17 RX ADMIN — OXYCODONE AND ACETAMINOPHEN 1 TABLET(S): 5; 325 TABLET ORAL at 05:17

## 2018-01-17 NOTE — PROGRESS NOTE ADULT - SUBJECTIVE AND OBJECTIVE BOX
PULMONARY PROGRESS NOTE    NATHAN MEEKS  MRN-[id number]    Patient is a 62y old  Female who presents with a chief complaint of Nausea, Weakness, Diffuse abdominal pain (13 Jan 2018 04:03)      HPI:  -admitted with vomiting/DKA.  Patient reports feeling much better, denies sob/cough/vomiting/diarrhea.  Wants to go home.    ROS:   -afebrile      ACTIVE MEDICATION LIST:  MEDICATIONS  (STANDING):  aspirin enteric coated 81 milliGRAM(s) Oral daily  atorvastatin 20 milliGRAM(s) Oral at bedtime  cinacalcet 60 milliGRAM(s) Oral daily  clopidogrel Tablet 75 milliGRAM(s) Oral at bedtime  dextrose 5%. 1000 milliLiter(s) (50 mL/Hr) IV Continuous <Continuous>  dextrose 50% Injectable 12.5 Gram(s) IV Push once  dextrose 50% Injectable 25 Gram(s) IV Push once  dextrose 50% Injectable 25 Gram(s) IV Push once  DULoxetine 60 milliGRAM(s) Oral every other day  heparin  Injectable 5000 Unit(s) SubCutaneous every 8 hours  hydrALAZINE 50 milliGRAM(s) Oral every 8 hours  insulin lispro (HumaLOG) Pump 1 Each SubCutaneous Continuous Pump  lisinopril 40 milliGRAM(s) Oral daily  metoprolol     tartrate 25 milliGRAM(s) Oral two times a day    MEDICATIONS  (PRN):  dextrose Gel 1 Dose(s) Oral once PRN Blood Glucose LESS THAN 70 milliGRAM(s)/deciLiter  glucagon  Injectable 1 milliGRAM(s) IntraMuscular once PRN Glucose <70 milliGRAM(s)/deciLiter  oxyCODONE    5 mG/acetaminophen 325 mG 1 Tablet(s) Oral every 6 hours PRN Moderate to Severe Pain      EXAM:  Vital Signs Last 24 Hrs  T(C): 37.1 (17 Jan 2018 05:00), Max: 37.1 (17 Jan 2018 05:00)  T(F): 98.7 (17 Jan 2018 05:00), Max: 98.7 (17 Jan 2018 05:00)  HR: 74 (17 Jan 2018 05:00) (71 - 78)  BP: 131/73 (17 Jan 2018 05:00) (116/60 - 166/83)  BP(mean): --  RR: 18 (17 Jan 2018 05:00) (16 - 18)  SpO2: 97% (17 Jan 2018 05:00) (94% - 99%)    GENERAL: The patient is awake and alert in no apparent distress.     SKIN: Warm, dry,    LUNGS: Clear     HEART: S1/S2    ABDOMEN: +BS, Soft, Nontender    EXTREMITIES: No clubbing, cyanosis    LABS/IMGAING: reviewed                        7.3    4.78  )-----------( 272      ( 17 Jan 2018 07:32 )             24.0     01-16    136  |  93<L>  |  35<H>  ----------------------------<  69<L>  4.3   |  25  |  6.07<H>    Ca    7.4<L>      16 Jan 2018 09:07  Phos  4.1     01-16  Mg     2.1     01-16    TPro  5.9<L>  /  Alb  3.2<L>  /  TBili  0.4  /  DBili  x   /  AST  114<H>  /  ALT  79<H>  /  AlkPhos  167<H>  01-16      < from: CT Chest No Cont (01.12.18 @ 12:11) >  IMPRESSION:    New patchy consolidation and groundglass of the superior segment of the   lingula and new acute opacity of the medial basilar left lower lobe are   concerning for multifocal pneumonia. Follow to resolution with post   treatment chest CT in 4-6 weeks.    Dr. Kahn discussed these findings with Dr. Tate on 1/12/2018 at   7:41 PM, with read back.    < end of copied text >      PROBLEM LIST:  62y Female with HEALTH ISSUES - PROBLEM Dx:  Pulmonary nodules  Hyperlipidemia  Essential hypertension  Type 1 diabetes mellitus   DKA (diabetic ketoacidosis)  ESRD (end stage renal disease    RECS:  -Pulmonary nodules: slowly increasing in size, was not PET avid, patient will follow up with  Friday in the office for further management decisions.  -DKA: resolved  -ESRD: HD per renal    Alem Tate MD  [phone]

## 2018-01-17 NOTE — PROGRESS NOTE ADULT - SUBJECTIVE AND OBJECTIVE BOX
Diabetes Follow up note: Saw pt earlier today  Interval Hx: 62 year old female w/uncontrolled T1DM w/complications/comorbidities (managed on insulin pump at home) w/ESRD here w/DKA in setting of "stomach virus" as per pt. Pt tolerating POs, but reports eating less than at home. Eager to go home today.  Glycemic control mostly at goal except for overnight BG <100s> Pt states at home BGs are in 100s all the time on present insulin pump settings. No further hypoglycemia.    Review of Systems:  General: "I want to go home today"  GI: Denies n/v/abd pain at time of visit.   CV: No CP/SOB  ENDO: No S&Sx of hypoglycemia      MEDS:  atorvastatin 20 milliGRAM(s) Oral at bedtime  insulin lispro (HumaLOG) Pump 1 Each SubCutaneous Continuous Pump with the following settings:  Basal  12am – 0.275 units/hour  5am – 0.375 units/hour  Total daily basal – 8.5 units    ICR  12am-12am – 1:15    ISF  12am – 60  7am – 40  6pm – 60    BGT  12am – 110-130 mg/dl   8am – 100-120 mg/dl     Insulin duration: 6 hours   Reviewed bolus with pt >Eating very small amounts of carbs while in hospital.    Allergies    No Known Allergies      PE:  General: Female sitting in chair in NAD.   Vital Signs Last 24 Hrs  T(C): 37.1 (01-17-18 @ 11:44), Max: 37.1 (01-17-18 @ 05:00)  T(F): 98.7 (01-17-18 @ 11:44), Max: 98.7 (01-17-18 @ 05:00)  HR: 68 (01-17-18 @ 11:44) (68 - 78)  BP: 163/85 (01-17-18 @ 11:44) (116/60 - 166/83)  BP(mean): --  RR: 18 (01-17-18 @ 11:44) (16 - 18)  SpO2: 96% (01-17-18 @ 11:44) (94% - 99%)  Abd: Soft, NT, ND. R quadrant insulin pump site in place D&I   Extremities: Warm, no edema noted in all 4 exts  Neuro: A&Ox3    LABS:  POCT Blood Glucose.: 105 mg/dL (01-17-18 @ 11:16)  POCT Blood Glucose.: 104 mg/dL (01-17-18 @ 07:38)  POCT Blood Glucose.: 99 mg/dL (01-17-18 @ 02:38)  POCT Blood Glucose.: 94 mg/dL (01-16-18 @ 22:15)  POCT Blood Glucose.: 183 mg/dL (01-16-18 @ 17:46)  POCT Blood Glucose.: 106 mg/dL (01-16-18 @ 11:36)  POCT Blood Glucose.: 100 mg/dL (01-16-18 @ 08:09)  POCT Blood Glucose.: 84 mg/dL (01-16-18 @ 06:05)  POCT Blood Glucose.: 79 mg/dL (01-16-18 @ 05:04)  POCT Blood Glucose.: 78 mg/dL (01-16-18 @ 02:56)  POCT Blood Glucose.: 76 mg/dL (01-16-18 @ 02:02)  POCT Blood Glucose.: 105 mg/dL (01-16-18 @ 00:46)  POCT Blood Glucose.: 119 mg/dL (01-16-18 @ 00:27)                          7.3    4.78  )-----------( 272      ( 17 Jan 2018 07:32 )             24.0       01-16    136  |  93<L>  |  35<H>  ----------------------------<  69<L>  4.3   |  25  |  6.07<H>    Ca    7.4<L>      16 Jan 2018 09:07  Phos  4.1     01-16  Mg     2.1     01-16    TPro  5.9<L>  /  Alb  3.2<L>  /  TBili  0.4  /  DBili  x   /  AST  114<H>  /  ALT  79<H>  /  AlkPhos  167<H>  01-16      Thyroid Function Tests:  12-19 @ 06:12 TSH 1.07 FreeT4 -- T3 -- Anti TPO -- Anti Thyroglobulin Ab -- TSI --      Hemoglobin A1C, Whole Blood: 8.5 % <H> [4.0 - 5.6] (11-25-17 @ 04:12)

## 2018-01-17 NOTE — DISCHARGE NOTE ADULT - PROVIDER TOKENS
TOKEN:'[id number]',TOKEN:'[id number]',FREE:[LAST:[md],PHONE:[(   )    -],FAX:[(   )    -],ADDRESS:[endocrinologist Dr Av Heller on 2/7/18 at 11:30AM [address] [place] [address]. Phone: [phone]]],TOKEN:'[id number]'

## 2018-01-17 NOTE — PROGRESS NOTE ADULT - PROBLEM SELECTOR PLAN 1
Discharge:  -test BG AC/HS/2AM while in hospital. Pt to test BG at 12mn tonight to make sure BG >100.  -Might continue use of insulin pump.  -Has follow up apt with endocrinologist Dr Av Heller on 2/7/18 at 11:30AM [address] [place] [address]. Phone: [phone]  -Plan discussed with pt/team.  Contact info: [phone] (24/7). pager [phone]

## 2018-01-17 NOTE — PROGRESS NOTE ADULT - PROVIDER SPECIALTY LIST ADULT
Endocrinology
Internal Medicine
Internal Medicine
MICU
MICU
Nephrology
Pulmonology
Internal Medicine

## 2018-01-17 NOTE — DISCHARGE NOTE ADULT - CARE PROVIDER_API CALL
Daisy Burger (), Nephrology  [address] [facility] [address]  [place] [address]  Phone: [phone]  Fax: [phone]    Braden Thompson (), Internal Medicine; Pulmonary Disease  [address] [facility]  [address]  [place] [address]  Phone: [phone]  Fax: [phone]    md,   endocrinologist Dr Av Heller on 2/7/18 at 11:30AM [address] [place] [address]. Phone: [phone]  Phone: (   )    -  Fax: (   )    -    Pierce Viera), Internal Medicine  [address] [place] [address]  Phone: [phone]  Fax: [phone]

## 2018-01-17 NOTE — PROGRESS NOTE ADULT - SUBJECTIVE AND OBJECTIVE BOX
Patient is a 62y old  Female who presents with a chief complaint of Nausea, Weakness, Diffuse abdominal pain (17 Jan 2018 13:29)      SUBJECTIVE / OVERNIGHT EVENTS: Comfortable without new complaints. Wants to go home.  Review of Systems  chest pain no  palpitations no  sob no  nausea no  headache no    MEDICATIONS  (STANDING):  aspirin enteric coated 81 milliGRAM(s) Oral daily  atorvastatin 20 milliGRAM(s) Oral at bedtime  cinacalcet 60 milliGRAM(s) Oral daily  clopidogrel Tablet 75 milliGRAM(s) Oral at bedtime  dextrose 5%. 1000 milliLiter(s) (50 mL/Hr) IV Continuous <Continuous>  dextrose 50% Injectable 12.5 Gram(s) IV Push once  dextrose 50% Injectable 25 Gram(s) IV Push once  dextrose 50% Injectable 25 Gram(s) IV Push once  DULoxetine 60 milliGRAM(s) Oral every other day  heparin  Injectable 5000 Unit(s) SubCutaneous every 8 hours  hydrALAZINE 50 milliGRAM(s) Oral every 8 hours  insulin lispro (HumaLOG) Pump 1 Each SubCutaneous Continuous Pump  lisinopril 40 milliGRAM(s) Oral daily  metoprolol     tartrate 25 milliGRAM(s) Oral two times a day    MEDICATIONS  (PRN):  dextrose Gel 1 Dose(s) Oral once PRN Blood Glucose LESS THAN 70 milliGRAM(s)/deciLiter  glucagon  Injectable 1 milliGRAM(s) IntraMuscular once PRN Glucose <70 milliGRAM(s)/deciLiter  oxyCODONE    5 mG/acetaminophen 325 mG 1 Tablet(s) Oral every 6 hours PRN Moderate to Severe Pain          PHYSICAL EXAM:  GENERAL: NAD, well-developed  HEAD:  Atraumatic, Normocephalic  EYES: EOMI, PERRLA, conjunctiva and sclera clear  NECK: Supple, No JVD  CHEST/LUNG: Clear to auscultation bilaterally; No wheeze  HEART: Regular rate and rhythm; No murmurs, rubs, or gallops  ABDOMEN: Soft, Nontender, Nondistended; Bowel sounds present  EXTREMITIES:  2+ Peripheral Pulses, No clubbing, cyanosis, or edema  PSYCH: AAOx3  NEUROLOGY: non-focal  SKIN: No rashes or lesions    LABS:                        7.3    4.78  )-----------( 272      ( 17 Jan 2018 07:32 )             24.0     01-16    136  |  93<L>  |  35<H>  ----------------------------<  69<L>  4.3   |  25  |  6.07<H>    Ca    7.4<L>      16 Jan 2018 09:07  Phos  4.1     01-16  Mg     2.1     01-16    TPro  5.9<L>  /  Alb  3.2<L>  /  TBili  0.4  /  DBili  x   /  AST  114<H>  /  ALT  79<H>  /  AlkPhos  167<H>  01-16    PT/INR - ( 16 Jan 2018 08:56 )   PT: 11.8 sec;   INR: 1.04 ratio         PTT - ( 16 Jan 2018 08:56 )  PTT:26.4 sec          RADIOLOGY & ADDITIONAL TESTS:    Imaging Personally Reviewed:    Consultant(s) Notes Reviewed:      Care Discussed with Consultants/Other Providers:

## 2018-01-17 NOTE — PROGRESS NOTE ADULT - ASSESSMENT
62 f with  Diabetes type 1/DKA- continue Insulin coverage, Restart insulin pump. Endocrine follow  ESRD- HD  CAD- stable.  HTN control  Abdominal pain resolved probably DKA related.  Anemia- follow Hct.  DC home. Close follow with PMD/Nephrology/Endocrine.  Pierce Viera MD pager [phone]

## 2018-01-17 NOTE — DISCHARGE NOTE ADULT - PATIENT PORTAL LINK FT
“You can access the Follow[employer] Patient Portal, offered by [facility], by registering with the following website: [email or web address]”

## 2018-01-17 NOTE — DISCHARGE NOTE ADULT - MEDICATION SUMMARY - MEDICATIONS TO TAKE
I will START or STAY ON the medications listed below when I get home from the hospital:    aspirin 81 mg oral delayed release tablet  -- 1 tab(s) by mouth once a day  -- Indication: For antiplatelet    oxyCODONE-acetaminophen 5 mg-325 mg oral tablet  -- 1 tab(s) by mouth every 6 hours, As needed, Severe Pain (7 - 10)  -- Indication: For Pain    lisinopril 40 mg oral tablet  -- 1 tab(s) by mouth once a day  -- Indication: For Essential hypertension    DULoxetine 60 mg oral delayed release capsule  -- 1 cap(s) by mouth every other day  -- Indication: For mood/pain    insulin lispro 100 units/mL subcutaneous solution  -- as per  insulin pump setting  -- Indication: For Type 1 diabetes mellitus with hyperglycemia    atorvastatin 20 mg oral tablet  -- 1 tab(s) by mouth once a day (at bedtime)  -- Indication: For lipids    clopidogrel 75 mg oral tablet  -- 1 tab(s) by mouth once a day (at bedtime)  -- Indication: For antiplatelet    metoprolol tartrate 25 mg oral tablet  -- 1 tab(s) by mouth 2 times a day  -- Indication: For Essential hypertension    cinacalcet 60 mg oral tablet  -- 1 tab(s) by mouth once a day  -- Indication: For ESRD (end stage renal disease)    furosemide 40 mg oral tablet  -- 1 tab(s) by mouth once a day  -- Indication: For Diuretic    hydrALAZINE 50 mg oral tablet  -- 1 tab(s) by mouth every 8 hours  -- Indication: For Essential hypertension I will START or STAY ON the medications listed below when I get home from the hospital:    aspirin 81 mg oral delayed release tablet  -- 1 tab(s) by mouth once a day  -- Indication: For antiplatelet    oxyCODONE-acetaminophen 5 mg-325 mg oral tablet  -- 1 tab(s) by mouth every 6 hours, As needed, Severe Pain (7 - 10)  -- Indication: For Pain    lisinopril 40 mg oral tablet  -- 1 tab(s) by mouth once a day  -- Indication: For Essential hypertension    DULoxetine 60 mg oral delayed release capsule  -- 1 cap(s) by mouth every other day  -- Indication: For mood/pain    insulin lispro 100 units/mL subcutaneous solution  -- as per  insulin pump setting  -- Indication: For Type 1 diabetes mellitus with hyperglycemia    atorvastatin 20 mg oral tablet  -- 1 tab(s) by mouth once a day (at bedtime)  -- Indication: For lipids    clopidogrel 75 mg oral tablet  -- 1 tab(s) by mouth once a day (at bedtime)  -- Indication: For antiplatelet    metoprolol tartrate 25 mg oral tablet  -- 1 tab(s) by mouth 2 times a day  -- Indication: For Essential hypertension    cinacalcet 60 mg oral tablet  -- 1 tab(s) by mouth once a day  -- Indication: For ESRD (end stage renal disease)    hydrALAZINE 50 mg oral tablet  -- 1 tab(s) by mouth every 8 hours  -- Indication: For Essential hypertension

## 2018-01-17 NOTE — PROGRESS NOTE ADULT - ASSESSMENT
63 y/o F w/ uncontrolled Type 1 DM w/ ESRD on HD neuropathy, retinopathy, macrovascular complications of CAD on insulin pump at home with hyperglycemia now admitted with DKA (resolved). Tolerating POsbut eating small amounts of carbs. No further hypoglycemia. Eager to go home today.    Spoke to pt about checking BG at night to make sure her BGs go back to 100s. Pt verbalized understanding.

## 2018-01-17 NOTE — DISCHARGE NOTE ADULT - HOSPITAL COURSE
61 y/o F w/ uncontrolled Type 1 DM w/ ESRD on HD neuropathy, retinopathy, macrovascular complications of CAD on insulin pump at home with hyperglycemia now admitted with DKA (resolved).     Spoke to pt about checking BG at night to make sure her blood sugar  100s. Pt verbalized understanding.  Type 1 diabetes mellitus with ketoacidosis without coma. : continue use of insulin pump.  -Has follow up apt with endocrinologist Dr Av Heller on 2/7/18 at 11:30AM [address] [place] [address]. Phone: [phone]   Insulin pump -Due to change insulin pump site 1/18/18  -Continue present insulin pump settings as discussed by endocrinology team;  -IDiabetes type 1/DKA- insulin pump.  Pulmonary nodules: slowly increasing in size,  patient will follow up with  Friday in the office for further management decisions.    ESRD- Hemodilysis- follow up with renal MD;  CAD- stable.  HTN control  Abdominal pain resolved-   Anemia- follow up cbc  follow up with home care;

## 2018-01-17 NOTE — DISCHARGE NOTE ADULT - MEDICATION SUMMARY - MEDICATIONS TO STOP TAKING
I will STOP taking the medications listed below when I get home from the hospital:    sevelamer hydrochloride 800 mg oral tablet  -- 2 tab(s) by mouth 3 times a day (with meals) I will STOP taking the medications listed below when I get home from the hospital:    sevelamer hydrochloride 800 mg oral tablet  -- 2 tab(s) by mouth 3 times a day (with meals)    furosemide 40 mg oral tablet  -- 1 tab(s) by mouth once a day

## 2018-01-17 NOTE — PROGRESS NOTE ADULT - PROBLEM SELECTOR PLAN 2
-Due to change insulin pump site 1/18/18  -Continue present insulin pump settings as noted above.  -If hypoglycemic contact endo team  --Plan discussed with pt/team.  Contact info: [phone] (24/7). pager [phone]

## 2018-01-17 NOTE — DISCHARGE NOTE ADULT - MEDICATION SUMMARY - MEDICATIONS TO CHANGE
I will SWITCH the dose or number of times a day I take the medications listed below when I get home from the hospital:    hydrALAZINE 25 mg oral tablet  -- 4 tab(s) by mouth every 8 hours    HumaLOG 100 units/mL subcutaneous solution  -- 150 unit(s) subcutaneous daily via pump

## 2018-01-17 NOTE — PROGRESS NOTE ADULT - PROBLEM SELECTOR PROBLEM 1
ESRD (end stage renal disease)
Type 1 diabetes mellitus with ketoacidosis without coma

## 2018-01-17 NOTE — DISCHARGE NOTE ADULT - COMMUNITY RESOURCES
Spoke to Nurse Wolf at [facility], [phone] to let them know that you will be discharged from our hospital and will be returning to their clinic on your regularly scheduled time for resumption of outpatient hemodialysis treatment.

## 2018-01-17 NOTE — DISCHARGE NOTE ADULT - CARE PLAN
Principal Discharge DX:	DKA (diabetic ketoacidosis)  Goal:	remains stable without complication  Assessment and plan of treatment:	hospital. Pt to test BG at 12mn tonight to make sure BG >100.   continue use of insulin pump.  -Has follow up apt with endocrinologist Dr Av Heller on 2/7/18 at 11:30AM [address] [place] [address]. Phone: [phone]    ·  Due to change insulin pump site 1/18/18  -Continue present insulin pump settings  -  Secondary Diagnosis:	ESRD (end stage renal disease)  Assessment and plan of treatment:	continue hemodialysis   follow up with renal MD  Secondary Diagnosis:	Pulmonary nodule  Assessment and plan of treatment:	Pulmonary nodules: slowly increasing in size,  patient will follow up with  Friday in the office for further management decisions.

## 2018-01-17 NOTE — DISCHARGE NOTE ADULT - PLAN OF CARE
remains stable without complication [facility]. Pt to test BG at 12mn tonight to make sure BG >100.   continue use of insulin pump.  -Has follow up apt with endocrinologist Dr Av Heller on 2/7/18 at 11:30AM [address] [place] [address]. Phone: [phone]    ·  Due to change insulin pump site 1/18/18  -Continue present insulin pump settings  - continue hemodialysis   follow up with renal MD Pulmonary nodules: slowly increasing in size,  patient will follow up with  Friday in the office for further management decisions.

## 2018-01-18 LAB
CULTURE RESULTS: SIGNIFICANT CHANGE UP
CULTURE RESULTS: SIGNIFICANT CHANGE UP
SPECIMEN SOURCE: SIGNIFICANT CHANGE UP
SPECIMEN SOURCE: SIGNIFICANT CHANGE UP

## 2018-02-28 ENCOUNTER — INPATIENT (INPATIENT)
Facility: HOSPITAL | Age: 63
LOS: 3 days | Discharge: HOME | DRG: 919 | End: 2018-03-04
Attending: INTERNAL MEDICINE | Admitting: GENERAL ACUTE CARE HOSPITAL
Payer: MEDICARE

## 2018-02-28 VITALS
HEART RATE: 82 BPM | RESPIRATION RATE: 19 BRPM | SYSTOLIC BLOOD PRESSURE: 89 MMHG | OXYGEN SATURATION: 97 % | DIASTOLIC BLOOD PRESSURE: 37 MMHG

## 2018-02-28 DIAGNOSIS — I25.10 ATHEROSCLEROTIC HEART DISEASE OF NATIVE CORONARY ARTERY WITHOUT ANGINA PECTORIS: ICD-10-CM

## 2018-02-28 DIAGNOSIS — E10.10 TYPE 1 DIABETES MELLITUS WITH KETOACIDOSIS WITHOUT COMA: ICD-10-CM

## 2018-02-28 DIAGNOSIS — Z29.9 ENCOUNTER FOR PROPHYLACTIC MEASURES, UNSPECIFIED: ICD-10-CM

## 2018-02-28 DIAGNOSIS — E87.5 HYPERKALEMIA: ICD-10-CM

## 2018-02-28 DIAGNOSIS — Z98.89 OTHER SPECIFIED POSTPROCEDURAL STATES: Chronic | ICD-10-CM

## 2018-02-28 DIAGNOSIS — N18.6 END STAGE RENAL DISEASE: ICD-10-CM

## 2018-02-28 LAB
ACETONE SERPL-MCNC: ABNORMAL
ALBUMIN SERPL ELPH-MCNC: 4.1 G/DL — SIGNIFICANT CHANGE UP (ref 3.3–5)
ALP SERPL-CCNC: 147 U/L — HIGH (ref 40–120)
ALT FLD-CCNC: 24 U/L RC — SIGNIFICANT CHANGE UP (ref 10–45)
ANION GAP SERPL CALC-SCNC: 29 MMOL/L — HIGH (ref 5–17)
ANION GAP SERPL CALC-SCNC: 37 MMOL/L — HIGH (ref 5–17)
APTT BLD: 29.5 SEC — SIGNIFICANT CHANGE UP (ref 27.5–37.4)
AST SERPL-CCNC: 46 U/L — HIGH (ref 10–40)
BASE EXCESS BLDV CALC-SCNC: -4.6 MMOL/L — LOW (ref -2–2)
BASOPHILS # BLD AUTO: 0 K/UL — SIGNIFICANT CHANGE UP (ref 0–0.2)
BASOPHILS # BLD AUTO: 0 K/UL — SIGNIFICANT CHANGE UP (ref 0–0.2)
BASOPHILS NFR BLD AUTO: 0 % — SIGNIFICANT CHANGE UP (ref 0–2)
BASOPHILS NFR BLD AUTO: 0.3 % — SIGNIFICANT CHANGE UP (ref 0–2)
BILIRUB SERPL-MCNC: 0.7 MG/DL — SIGNIFICANT CHANGE UP (ref 0.2–1.2)
BUN SERPL-MCNC: 68 MG/DL — HIGH (ref 7–23)
BUN SERPL-MCNC: 72 MG/DL — HIGH (ref 7–23)
CA-I SERPL-SCNC: 1.1 MMOL/L — LOW (ref 1.12–1.3)
CALCIUM SERPL-MCNC: 8.4 MG/DL — SIGNIFICANT CHANGE UP (ref 8.4–10.5)
CALCIUM SERPL-MCNC: 8.7 MG/DL — SIGNIFICANT CHANGE UP (ref 8.4–10.5)
CHLORIDE BLDV-SCNC: 89 MMOL/L — LOW (ref 96–108)
CHLORIDE SERPL-SCNC: 76 MMOL/L — LOW (ref 96–108)
CHLORIDE SERPL-SCNC: 85 MMOL/L — LOW (ref 96–108)
CK MB BLD-MCNC: 4.9 % — HIGH (ref 0–3.5)
CK MB CFR SERPL CALC: 4.7 NG/ML — HIGH (ref 0–3.8)
CK MB CFR SERPL CALC: 7.6 NG/ML — HIGH (ref 0–3.8)
CK SERPL-CCNC: 152 U/L — SIGNIFICANT CHANGE UP (ref 25–170)
CK SERPL-CCNC: 155 U/L — SIGNIFICANT CHANGE UP (ref 25–170)
CO2 BLDV-SCNC: 22 MMOL/L — SIGNIFICANT CHANGE UP (ref 22–30)
CO2 SERPL-SCNC: 13 MMOL/L — LOW (ref 22–31)
CO2 SERPL-SCNC: 19 MMOL/L — LOW (ref 22–31)
CREAT SERPL-MCNC: 7.17 MG/DL — HIGH (ref 0.5–1.3)
CREAT SERPL-MCNC: 7.34 MG/DL — HIGH (ref 0.5–1.3)
EOSINOPHIL # BLD AUTO: 0 K/UL — SIGNIFICANT CHANGE UP (ref 0–0.5)
EOSINOPHIL # BLD AUTO: 0 K/UL — SIGNIFICANT CHANGE UP (ref 0–0.5)
EOSINOPHIL NFR BLD AUTO: 0.1 % — SIGNIFICANT CHANGE UP (ref 0–6)
EOSINOPHIL NFR BLD AUTO: 0.1 % — SIGNIFICANT CHANGE UP (ref 0–6)
GAS PNL BLDV: 130 MMOL/L — LOW (ref 136–145)
GAS PNL BLDV: SIGNIFICANT CHANGE UP
GLUCOSE BLDC GLUCOMTR-MCNC: 189 MG/DL — HIGH (ref 70–99)
GLUCOSE BLDC GLUCOMTR-MCNC: 372 MG/DL — HIGH (ref 70–99)
GLUCOSE BLDV-MCNC: 605 MG/DL — CRITICAL HIGH (ref 70–99)
GLUCOSE SERPL-MCNC: 1117 MG/DL — CRITICAL HIGH (ref 70–99)
GLUCOSE SERPL-MCNC: 671 MG/DL — CRITICAL HIGH (ref 70–99)
HCO3 BLDV-SCNC: 21 MMOL/L — SIGNIFICANT CHANGE UP (ref 21–29)
HCT VFR BLD CALC: 25.8 % — LOW (ref 34.5–45)
HCT VFR BLD CALC: 31.4 % — LOW (ref 34.5–45)
HCT VFR BLDA CALC: 26 % — LOW (ref 39–50)
HGB BLD CALC-MCNC: 8.5 G/DL — LOW (ref 11.5–15.5)
HGB BLD-MCNC: 8.8 G/DL — LOW (ref 11.5–15.5)
HGB BLD-MCNC: 9.9 G/DL — LOW (ref 11.5–15.5)
HOROWITZ INDEX BLDV+IHG-RTO: 30 — SIGNIFICANT CHANGE UP
INR BLD: 1.05 RATIO — SIGNIFICANT CHANGE UP (ref 0.88–1.16)
INR BLD: 1.07 RATIO — SIGNIFICANT CHANGE UP (ref 0.88–1.16)
LACTATE BLDV-MCNC: 4.2 MMOL/L — CRITICAL HIGH (ref 0.7–2)
LIDOCAIN IGE QN: 105 U/L — HIGH (ref 7–60)
LYMPHOCYTES # BLD AUTO: 0.5 K/UL — LOW (ref 1–3.3)
LYMPHOCYTES # BLD AUTO: 0.7 K/UL — LOW (ref 1–3.3)
LYMPHOCYTES # BLD AUTO: 5 % — LOW (ref 13–44)
LYMPHOCYTES # BLD AUTO: 7.3 % — LOW (ref 13–44)
MAGNESIUM SERPL-MCNC: 2.3 MG/DL — SIGNIFICANT CHANGE UP (ref 1.6–2.6)
MCHC RBC-ENTMCNC: 31.4 GM/DL — LOW (ref 32–36)
MCHC RBC-ENTMCNC: 34.1 GM/DL — SIGNIFICANT CHANGE UP (ref 32–36)
MCHC RBC-ENTMCNC: 34.3 PG — HIGH (ref 27–34)
MCHC RBC-ENTMCNC: 34.8 PG — HIGH (ref 27–34)
MCV RBC AUTO: 102 FL — HIGH (ref 80–100)
MCV RBC AUTO: 109 FL — HIGH (ref 80–100)
MONOCYTES # BLD AUTO: 0.6 K/UL — SIGNIFICANT CHANGE UP (ref 0–0.9)
MONOCYTES # BLD AUTO: 1.1 K/UL — HIGH (ref 0–0.9)
MONOCYTES NFR BLD AUTO: 11.7 % — SIGNIFICANT CHANGE UP (ref 2–14)
MONOCYTES NFR BLD AUTO: 6.1 % — SIGNIFICANT CHANGE UP (ref 2–14)
NEUTROPHILS # BLD AUTO: 7.5 K/UL — HIGH (ref 1.8–7.4)
NEUTROPHILS # BLD AUTO: 8.2 K/UL — HIGH (ref 1.8–7.4)
NEUTROPHILS NFR BLD AUTO: 83.2 % — HIGH (ref 43–77)
NEUTROPHILS NFR BLD AUTO: 86.3 % — HIGH (ref 43–77)
NT-PROBNP SERPL-SCNC: HIGH PG/ML (ref 0–300)
OTHER CELLS CSF MANUAL: 11 ML/DL — LOW (ref 18–22)
PCO2 BLDV: 43 MMHG — SIGNIFICANT CHANGE UP (ref 35–50)
PH BLDV: 7.31 — LOW (ref 7.35–7.45)
PHOSPHATE SERPL-MCNC: 6.7 MG/DL — HIGH (ref 2.5–4.5)
PLATELET # BLD AUTO: 230 K/UL — SIGNIFICANT CHANGE UP (ref 150–400)
PLATELET # BLD AUTO: 282 K/UL — SIGNIFICANT CHANGE UP (ref 150–400)
PO2 BLDV: 74 MMHG — HIGH (ref 25–45)
POTASSIUM BLDV-SCNC: 4.9 MMOL/L — SIGNIFICANT CHANGE UP (ref 3.5–5)
POTASSIUM SERPL-MCNC: 5.2 MMOL/L — SIGNIFICANT CHANGE UP (ref 3.5–5.3)
POTASSIUM SERPL-MCNC: 7.7 MMOL/L — CRITICAL HIGH (ref 3.5–5.3)
POTASSIUM SERPL-SCNC: 5.2 MMOL/L — SIGNIFICANT CHANGE UP (ref 3.5–5.3)
POTASSIUM SERPL-SCNC: 7.7 MMOL/L — CRITICAL HIGH (ref 3.5–5.3)
PROT SERPL-MCNC: 7 G/DL — SIGNIFICANT CHANGE UP (ref 6–8.3)
PROTHROM AB SERPL-ACNC: 11.5 SEC — SIGNIFICANT CHANGE UP (ref 9.8–12.7)
PROTHROM AB SERPL-ACNC: 11.7 SEC — SIGNIFICANT CHANGE UP (ref 9.8–12.7)
RBC # BLD: 2.53 M/UL — LOW (ref 3.8–5.2)
RBC # BLD: 2.88 M/UL — LOW (ref 3.8–5.2)
RBC # FLD: 13.2 % — SIGNIFICANT CHANGE UP (ref 10.3–14.5)
RBC # FLD: 13.4 % — SIGNIFICANT CHANGE UP (ref 10.3–14.5)
SAO2 % BLDV: 92 % — HIGH (ref 67–88)
SODIUM SERPL-SCNC: 126 MMOL/L — LOW (ref 135–145)
SODIUM SERPL-SCNC: 133 MMOL/L — LOW (ref 135–145)
TROPONIN T SERPL-MCNC: 0.14 NG/ML — HIGH (ref 0–0.06)
TROPONIN T SERPL-MCNC: 0.22 NG/ML — HIGH (ref 0–0.06)
WBC # BLD: 9 K/UL — SIGNIFICANT CHANGE UP (ref 3.8–10.5)
WBC # BLD: 9.5 K/UL — SIGNIFICANT CHANGE UP (ref 3.8–10.5)
WBC # FLD AUTO: 9 K/UL — SIGNIFICANT CHANGE UP (ref 3.8–10.5)
WBC # FLD AUTO: 9.5 K/UL — SIGNIFICANT CHANGE UP (ref 3.8–10.5)

## 2018-02-28 PROCEDURE — 71045 X-RAY EXAM CHEST 1 VIEW: CPT | Mod: 26

## 2018-02-28 PROCEDURE — 99291 CRITICAL CARE FIRST HOUR: CPT

## 2018-02-28 PROCEDURE — 99291 CRITICAL CARE FIRST HOUR: CPT | Mod: 25,GC

## 2018-02-28 PROCEDURE — 93010 ELECTROCARDIOGRAM REPORT: CPT

## 2018-02-28 RX ORDER — CALCIUM GLUCONATE 100 MG/ML
1 VIAL (ML) INTRAVENOUS ONCE
Qty: 0 | Refills: 0 | Status: COMPLETED | OUTPATIENT
Start: 2018-02-28 | End: 2018-02-28

## 2018-02-28 RX ORDER — HEPARIN SODIUM 5000 [USP'U]/ML
5000 INJECTION INTRAVENOUS; SUBCUTANEOUS EVERY 8 HOURS
Qty: 0 | Refills: 0 | Status: DISCONTINUED | OUTPATIENT
Start: 2018-02-28 | End: 2018-03-04

## 2018-02-28 RX ORDER — SODIUM CHLORIDE 9 MG/ML
250 INJECTION INTRAMUSCULAR; INTRAVENOUS; SUBCUTANEOUS ONCE
Qty: 0 | Refills: 0 | Status: COMPLETED | OUTPATIENT
Start: 2018-02-28 | End: 2018-02-28

## 2018-02-28 RX ORDER — ALBUTEROL 90 UG/1
2.5 AEROSOL, METERED ORAL
Qty: 0 | Refills: 0 | Status: COMPLETED | OUTPATIENT
Start: 2018-02-28 | End: 2018-02-28

## 2018-02-28 RX ORDER — INSULIN HUMAN 100 [IU]/ML
10 INJECTION, SOLUTION SUBCUTANEOUS
Qty: 100 | Refills: 0 | Status: DISCONTINUED | OUTPATIENT
Start: 2018-02-28 | End: 2018-02-28

## 2018-02-28 RX ORDER — INSULIN HUMAN 100 [IU]/ML
10 INJECTION, SOLUTION SUBCUTANEOUS ONCE
Qty: 0 | Refills: 0 | Status: DISCONTINUED | OUTPATIENT
Start: 2018-02-28 | End: 2018-02-28

## 2018-02-28 RX ORDER — SODIUM BICARBONATE 1 MEQ/ML
50 SYRINGE (ML) INTRAVENOUS ONCE
Qty: 0 | Refills: 0 | Status: COMPLETED | OUTPATIENT
Start: 2018-02-28 | End: 2018-02-28

## 2018-02-28 RX ORDER — ACETAMINOPHEN 500 MG
1000 TABLET ORAL ONCE
Qty: 0 | Refills: 0 | Status: COMPLETED | OUTPATIENT
Start: 2018-02-28 | End: 2018-02-28

## 2018-02-28 RX ORDER — ASPIRIN/CALCIUM CARB/MAGNESIUM 324 MG
81 TABLET ORAL DAILY
Qty: 0 | Refills: 0 | Status: DISCONTINUED | OUTPATIENT
Start: 2018-02-28 | End: 2018-03-04

## 2018-02-28 RX ORDER — INSULIN HUMAN 100 [IU]/ML
10 INJECTION, SOLUTION SUBCUTANEOUS
Qty: 100 | Refills: 0 | Status: DISCONTINUED | OUTPATIENT
Start: 2018-02-28 | End: 2018-03-01

## 2018-02-28 RX ORDER — CLOPIDOGREL BISULFATE 75 MG/1
75 TABLET, FILM COATED ORAL AT BEDTIME
Qty: 0 | Refills: 0 | Status: DISCONTINUED | OUTPATIENT
Start: 2018-02-28 | End: 2018-03-04

## 2018-02-28 RX ORDER — INSULIN HUMAN 100 [IU]/ML
10 INJECTION, SOLUTION SUBCUTANEOUS ONCE
Qty: 0 | Refills: 0 | Status: COMPLETED | OUTPATIENT
Start: 2018-02-28 | End: 2018-02-28

## 2018-02-28 RX ORDER — SODIUM CHLORIDE 9 MG/ML
1000 INJECTION INTRAMUSCULAR; INTRAVENOUS; SUBCUTANEOUS
Qty: 0 | Refills: 0 | Status: DISCONTINUED | OUTPATIENT
Start: 2018-02-28 | End: 2018-03-01

## 2018-02-28 RX ORDER — ATORVASTATIN CALCIUM 80 MG/1
20 TABLET, FILM COATED ORAL AT BEDTIME
Qty: 0 | Refills: 0 | Status: DISCONTINUED | OUTPATIENT
Start: 2018-02-28 | End: 2018-03-04

## 2018-02-28 RX ORDER — CINACALCET 30 MG/1
60 TABLET, FILM COATED ORAL DAILY
Qty: 0 | Refills: 0 | Status: DISCONTINUED | OUTPATIENT
Start: 2018-02-28 | End: 2018-03-04

## 2018-02-28 RX ADMIN — ALBUTEROL 2.5 MILLIGRAM(S): 90 AEROSOL, METERED ORAL at 18:10

## 2018-02-28 RX ADMIN — INSULIN HUMAN 10 UNIT(S)/HR: 100 INJECTION, SOLUTION SUBCUTANEOUS at 18:51

## 2018-02-28 RX ADMIN — INSULIN HUMAN 10 UNIT(S)/HR: 100 INJECTION, SOLUTION SUBCUTANEOUS at 23:26

## 2018-02-28 RX ADMIN — SODIUM CHLORIDE 500 MILLILITER(S): 9 INJECTION INTRAMUSCULAR; INTRAVENOUS; SUBCUTANEOUS at 17:59

## 2018-02-28 RX ADMIN — SODIUM CHLORIDE 500 MILLILITER(S): 9 INJECTION INTRAMUSCULAR; INTRAVENOUS; SUBCUTANEOUS at 20:25

## 2018-02-28 RX ADMIN — Medication 200 GRAM(S): at 17:59

## 2018-02-28 RX ADMIN — Medication 1000 MILLIGRAM(S): at 20:53

## 2018-02-28 RX ADMIN — Medication 400 MILLIGRAM(S): at 20:25

## 2018-02-28 RX ADMIN — SODIUM CHLORIDE 50 MILLILITER(S): 9 INJECTION INTRAMUSCULAR; INTRAVENOUS; SUBCUTANEOUS at 22:12

## 2018-02-28 RX ADMIN — Medication 200 GRAM(S): at 18:22

## 2018-02-28 RX ADMIN — ALBUTEROL 2.5 MILLIGRAM(S): 90 AEROSOL, METERED ORAL at 18:52

## 2018-02-28 RX ADMIN — ALBUTEROL 2.5 MILLIGRAM(S): 90 AEROSOL, METERED ORAL at 18:23

## 2018-02-28 RX ADMIN — INSULIN HUMAN 10 UNIT(S): 100 INJECTION, SOLUTION SUBCUTANEOUS at 18:12

## 2018-02-28 RX ADMIN — Medication 50 MILLIEQUIVALENT(S): at 18:10

## 2018-02-28 NOTE — ED PROVIDER NOTE - OBJECTIVE STATEMENT
62F extensive PMH ESRD/HD, IDDM/insulin pump, CAD/CABG, CVAs p/w nausea and vomiting. Has felt generally unwell worsening over past couple days today having malaise, retching. Notes that her FSBG has continued to rise despite use of her insulin pump. Changed pump site 2 days ago, but no changes to dosing regimen. HD yesterday, completed full session. Here last month for similar presentation found to have DKA, hyperkalemia, admitted for HD, insulin/fluids. Sent with new insulin pump for presumed malfunction at that time. No fevers or infectious symptoms.

## 2018-02-28 NOTE — ED PROVIDER NOTE - PROGRESS NOTE DETAILS
MICU consulted, renal consulted Medical care done prior to documentation. Patient was evaluated by me, found in mild distress, but cooperative with examination. Patient given zofran prior to arrival at ED for treatment of nausea/vomiting. Physical exam revealed bradycardia with normal SBP. POC glucose reading revealed elevated blood glucose and ECG done upon arrival showed peaked T waves. Patient's last dialysis treatment was yesterday. Calcium gluconate was given before lab results. Labs revealed hyperglycemia with elevated anion gap and hyperkalemia. Meds including insulin drip given for treatment of hyperkalemia/hyperglycemia. MICU service agreed with admission to ICU, and nephrology service described patient was to be dialysed once in ICU. Patient with marked improvement of symptoms following treatment at ED. I agree with the fellow's assessment and plan. Dr. Haseeb Myers

## 2018-02-28 NOTE — H&P ADULT - PROBLEM SELECTOR PLAN 4
1) ASA and Plavix for CAD  2) Atrovastatin for HLD  3) Hold Metoprolol/Hydralazine/Lisinopril in setting of hypotension

## 2018-02-28 NOTE — H&P ADULT - PROBLEM SELECTOR PLAN 2
1) HD for nephrology (Dr. Burger)  2) Trend BMP q4H  3) Serial EKGs  4) Telemetry monitoring  5) Monitor for hypokalemia sp HD in the setting of correction of acidemia

## 2018-02-28 NOTE — ED ADULT NURSE NOTE - OBJECTIVE STATEMENT
62 yr old Female to the ED by EMS c/o abd pain, nausea, vomitting and generalized weakness, +hx DKA, ESRD, htn, (frequent adm for DKA to the ICU), per , pt receives HD 4 days/wk, last completed session yesterday, today while doing laundry,  noticed blood glucose level to be 400 and increasing on subsequent readings, +insulin pump, (site changed 3 days ago, today insulin pump located RL), NS/zofran 8 mg IV initiated by EMS for hypotension, SBP: 90's, upon arrival, finger stick in ED: "hi", at present pt presents +lethargic, +arousable, answers questions, +nausea, +dryheaving,  present, L lower leg swelling (chronic in nature per  since LE bypass graft years ago), +clear lungs, skin wdi, +independent with ADLs at baseline, +HR fluctuating 40's to 120's, - MD present in room, bedside US done: +pericardial effusion noted, portable cxr done, insulin gtt infusion initiated, +calcium gluconate 1 gram x 2 given, renal consult done by dr allen, awiating MICU consult

## 2018-02-28 NOTE — ED PROCEDURE NOTE - PROCEDURE ADDITIONAL DETAILS
Dr. Myers: Ultrasound-guided peripheral IV placed in right upper extremity. Placement confirmed with ultrasound and with blood return.

## 2018-02-28 NOTE — CONSULT NOTE ADULT - SUBJECTIVE AND OBJECTIVE BOX
[facility]  [phone]  NEPHROLOGY      INITIAL CONSULT NOTE  --------------------------------------------------------------------------------  HPI:   63 yo F PMH Type 1 DM on insulin pump with frequent ICU admissions for DKA, ESRD on HD 4x weekly, HTN, HLD, former smoker, MI, CAD s/p PCI to RCA and brachytherapy in Aug 2017, dCHF (last TTE- November 2017- mild-mod MR, mild AS, mild-mod TR EF40-45%), chronic LLE pain and edema in setting of severe PVD s/p L & R fem-pop bypass  graft, multiple vascular surgery both leg w/ fem-pop bypass revisions , subclavian vein stenosis left s/p stent, CVA with memory deficit brought into the ED via EMS c/o abdominal pain, nausea, and vomiting accompanied by elevated blood glucose.   In ED, hypotensive with vital signs 89/37, HR 82, RR 18, O2 Sat 97% on room air. Patient lethargic, but arousable.  K 7.7, venous Ph 7.2, HCO3 13 with a glucose level of 1117.  renal consult called. as discussed with ER team received 10 units IVP regular insulin x 1 and started on insulin gtt. BNP noted to be > 40k.   received NS 250cc x 1, Ca Gluconate, 1 amp Bicarb, and Albuterol x 1.     PAST HISTORY  --------------------------------------------------------------------------------  PAST MEDICAL & SURGICAL HISTORY:  Subclavian vein stenosis, left: s/p stent  Cellulitis: 2015 left foot  DKA, type 1: 1/2015  ACS (acute coronary syndrome): 1/2015 - cath revealed 100% ostial stenosis not amenable to PCI - medical management  MI, old  TIA (transient ischemic attack): x 2 - 8-9 years ago prior to ASD/VSD repair  CAD (coronary artery disease): s/p stents  Murmur, cardiac  Gout: past  CVA (cerebral infarction): with no residual, 8 yrs ago, prior to heart surgery - ST memory loss  Peripheral vascular disease: occluded left fem-pop bypass 5/2015  Diabetes mellitus type 1: Insulin Dependent - [employer]  Minimed Paradigm Insulin Pump - Novolog  ESRD (end stage renal disease): dialysis , tue, thursday, saturday  Hyperlipidemia  Status post device closure of ASD: &quot;clamshell&quot;  History of cardiac catheterization: 1/2015 - no intervention  S/P femoral-popliteal bypass surgery: L and R in 2013 with graft; 5/2015 CFA angioplasty left and ileofemoral endarterectomywith vein patch angioplasty of left fem-pop bypass graft  Multiple vascular surgery both leg, left fempop bypass revision 11/2015  AV (arteriovenous fistula): Left AV graft; revision with stent placement 2-3 years ago  S/P cholecystectomy    FAMILY HISTORY:  Family history of smoking  Family history of hypertension  Family history of cancer (Sibling)    PAST SOCIAL HISTORY:  past tobacco     ALLERGIES & MEDICATIONS  --------------------------------------------------------------------------------  Allergies    No Known Allergies    Intolerances      Standing Inpatient Medications  insulin Infusion 10 Unit(s)/Hr IV Continuous <Continuous>    PRN Inpatient Medications      REVIEW OF SYSTEMS  --------------------------------------------------------------------------------  lethargic    VITALS/PHYSICAL EXAM  --------------------------------------------------------------------------------  T(C): 36.8 (02-28-18 @ 20:22), Max: 36.8 (02-28-18 @ 17:22)  HR: 88 (02-28-18 @ 20:22) (46 - 88)  BP: 94/57 (02-28-18 @ 20:22) (89/37 - 107/51)  RR: 22 (02-28-18 @ 20:22) (19 - 25)  SpO2: 100% (02-28-18 @ 20:22) (89% - 100%)  Wt(kg): --        Physical Exam:  	Gen: when seen on FM remained lethargic   	+ JVD supple neck,   	Pulm: decrease bs no rales or ronchi  	CV: RRR, S1S2; no rub  	Abd: +BS, soft, nontender/nondistended  	: No suprapubic tenderness  	UE: Warm, no clubbing,  no edema; no tremors   	ext: Warm,  no clubbing, LLE 1++ and RLE trace edema  	Neuro: lethargic  	  LABS/STUDIES  --------------------------------------------------------------------------------              9.9    9.5   >-----------<  282      [02-28-18 @ 17:46]              31.4     126  |  76  |  72  ----------------------------<  1117      [02-28-18 @ 17:46]  7.7   |  13  |  7.17        Ca     8.4     [02-28-18 @ 17:46]    TPro  7.0  /  Alb  4.1  /  TBili  0.7  /  DBili  x   /  AST  46  /  ALT  24  /  AlkPhos  147  [02-28-18 @ 17:46]    PT/INR: PT 11.5 , INR 1.05       [02-28-18 @ 17:46]  PTT: 29.5       [02-28-18 @ 17:46]    Troponin 0.14      [02-28-18 @ 17:46]        [02-28-18 @ 17:46]    Creatinine Trend:  SCr 7.17 [02-28 @ 17:46]    Urinalysis - [06-04-17 @ 08:24]      Color Yellow / Appearance Clear / SG 1.013 / pH 8.5      Gluc 1000 / Ketone Negative  / Bili Negative / Urobili Negative       Blood Negative / Protein >600 / Leuk Est Small / Nitrite Negative      RBC 3-5 / WBC 6-10 / Hyaline  / Gran  / Sq Epi  / Non Sq Epi OCC / Bacteria       HbA1c 8.5      [11-25-17 @ 04:12]  TSH 1.07      [12-19-17 @ 06:12]    HBsAb <3.0      [01-16-18 @ 19:32]  HBsAb Nonreact      [05-02-15 @ 15:35]  HBsAg Nonreact      [01-16-18 @ 19:32]  HBcAb Nonreact      [12-19-17 @ 06:12]  HCV 0.13, Nonreact      [01-16-18 @ 19:32]

## 2018-02-28 NOTE — H&P ADULT - ATTENDING COMMENTS
This is a 61 yo F PMH Type 1 DM on insulin pump with frequent ICU admissions for DKA, ESRD on HD 4x weekly, HTN, HLD, former smoker, MI, CAD s/p PCI to RCA and brachytherapy in Aug 2017, dCHF (last TTE- November 2017- mild-mod MR, mild AS, mild-mod TR EF40-45%), chronic LLE pain and edema in setting of severe PVD s/p L & R fem-pop bypass  graft, multiple vascular surgery both leg w/ fem-pop bypass revisions , subclavian vein stenosis left s/p stent, CVA with memory deficit brought into the ED via EMS c/o abdominal pain, nausea, and vomiting accompanied by elevated blood glucose.  Per patient's , glucose level noted to be > 400 with subsequent readings higher.   In ED significant findings include BP 89/37 as well as  Lab work remarkable for K 7.7, venous Ph 7.2, HCO3 13 with a glucose level of 1117.  Patient subsequently upgraded to the MICU with-  1. AMS- multimodal- transient cerebral hypoperfusion, severe metabolic derangements ( uremia, acidosis, hyperosmolar state ) on baseline neurocognitive dysfunction, nonfocal, A&OX3, neuro checks Q3 hours , fall and seizure precautions, correct reversible factors, please send serum toxicology screen  2.hyperosmolar state coupled with ketoacidosis as well as some elements of starvation ketosis - gentle hydration as volume limited, insulin per DKA protocol , patient with type 1 DM on insulin pump- unclear precipitant but known nonadherence to dietary and medical therapy , will f/u with endocrine team as well as dietary and diabetic education. Check Hb A1c  3. Gastroenteritis / Gastroparesis- antiemetics, serial abdominal exams , monitor lactate, NPO for now +/_ imaging   4. ADHF combined- monitor and optimize electrolytes and fluid balance   5. Metabolic derangements ( hyperosmolar hyponatremia, hypochloremia), as well as severe hyperkalemia - translocating and evacuation therapy pending urgent HD   6. Hypotension- dehydration, distributive ( medication) , keep MAP > 65 hold anti hypertensives for now. would do surveillance cultures.   Care and treatment as detailed prior unless otherwise stated. Case discussed extensively with patient, , staff, team and specialist on board . AI c/s to further delineate patient's acute and subacute GOC. This is a 61 yo F PMH Type 1 DM on insulin pump with frequent ICU admissions for DKA, ESRD on HD 4x weekly, HTN, HLD, former smoker, MI, CAD s/p PCI to RCA and brachytherapy in Aug 2017, dCHF (last TTE- November 2017- mild-mod MR, mild AS, mild-mod TR EF40-45%), chronic LLE pain and edema in setting of severe PVD s/p L & R fem-pop bypass  graft, multiple vascular surgery both leg w/ fem-pop bypass revisions , subclavian vein stenosis left s/p stent, CVA with memory deficit brought into the ED via EMS c/o abdominal pain, nausea, and vomiting accompanied by elevated blood glucose.  Per patient's , glucose level noted to be > 400 with subsequent readings higher.   In ED significant findings include BP 89/37 as well as  Lab work remarkable for K 7.7, venous Ph 7.2, HCO3 13 with a glucose level of 1117.  Patient subsequently upgraded to the MICU with-  1. AMS- multimodal- transient cerebral hypoperfusion, severe metabolic derangements ( uremia, acidosis, hyperosmolar state ) on baseline neurocognitive dysfunction, nonfocal, A&OX3, neuro checks Q3 hours , fall and seizure precautions, correct reversible factors, please send serum toxicology screen  2.hyperosmolar state coupled with ketoacidosis as well as some elements of starvation ketosis - gentle hydration as volume limited, insulin per DKA protocol , patient with type 1 DM on insulin pump- unclear precipitant but known nonadherence to dietary and medical therapy , will f/u with endocrine team as well as dietary and diabetic education. Check Hb A1c  3. Gastroenteritis / Gastroparesis- antiemetics, serial abdominal exams , monitor lactate, NPO for now +/_ imaging   4. ADHF combined- monitor and optimize electrolytes and fluid balance   5. Metabolic derangements ( hyperosmolar hyponatremia, hypochloremia), as well as severe hyperkalemia - translocating and evacuation therapy pending urgent HD   6. Hypotension- dehydration, distributive ( medication) , keep MAP > 65 hold anti hypertensives for now. would do surveillance cultures.   7. Anemia- multimodal- nutritional as well as erythropoietin def, T&S keep hct . 24  8. Demand ischemia- con's supportive care, monitor and optimize electrolytes and fluid balance , repeat TTE,  cardiology f/u   Care and treatment as detailed prior unless otherwise stated. Case discussed extensively with patient, , staff, team and specialist on board . AI c/s to further delineate patient's acute and subacute GOC.

## 2018-02-28 NOTE — H&P ADULT - HISTORY OF PRESENT ILLNESS
This is a 63 yo F PMH  Type 1 DM on insulin pump with frequent ICU admissions for DKA, ESRD on HD 4x weekly, HTN brought to the ED via EMS c/o abdominal pain, nausea, and vomiting accompanied by elevated blood glucose.  Glucose noted to be > 400 with subsequent readings higher.  Received Zofran for nausea and normal saline prior to ED arrival. In ED, vital signs 89/37, HR 82, RR 18, O2 Sat 97% on room air. Patient lethargic, but arousable. Lab work remarkable for K 7.7, venous Ph 7.2, HCO3 13 with a glucose level of 1117.  Patient given 10 units IVP regular insulin x 1 and started on insulin gtt. BNP noted to be > 40k.  Dr. Schmidt consulted from nephrology, plan to dialyze patient tonight for K 7.7 with EKG changes/volume overload.  Also received NS 250cc x 1, Ca Gluconate, 1 amp Bicarb, and Albuterol x 1. Transferred to MICU for further management of DKA. This is a 63 yo F PMH Type 1 DM on insulin pump with frequent ICU admissions for DKA, ESRD on HD 4x weekly, HTN, HLD, former smoker, MI, CAD s/p PCI to RCA and brachytherapy in Aug 2017, dCHF (last TTE- November 2017- mild-mod MR, mild AS, mild-mod TR EF40-45%), chronic LLE pain and edema in setting of severe PVD s/p L & R fem-pop bypass  graft, multiple vascular surgery both leg w/ fem-pop bypass revisions , subclavian vein stenosis left s/p stent, CVA with memory deficit brought into the ED via EMS c/o abdominal pain, nausea, and vomiting accompanied by elevated blood glucose.  Per patient's , glucose level noted to be > 400 with subsequent readings higher.  Received Zofran for nausea and normal saline prior to ED arrival. In ED, vital signs 89/37, HR 82, RR 18, O2 Sat 97% on room air. Patient lethargic, but arousable. Lab work remarkable for K 7.7, venous Ph 7.2, HCO3 13 with a glucose level of 1117.  Patient given 10 units IVP regular insulin x 1 and started on insulin gtt. BNP noted to be > 40k.  Dr. Schmidt consulted from nephrology, plan to dialyze patient tonight for K 7.7 with EKG changes/volume overload.  Also received NS 250cc x 1, Ca Gluconate, 1 amp Bicarb, and Albuterol x 1. Transferred to MICU for further management of DKA.

## 2018-02-28 NOTE — ED PROCEDURE NOTE - CPROC ED SITE PREP1
Quality 111:Pneumonia Vaccination Status For Older Adults: Pneumococcal Vaccination Previously Received
Quality 110: Preventive Care And Screening: Influenza Immunization: Influenza Immunization Administered during Influenza season
Detail Level: Detailed
chlorhexidine

## 2018-02-28 NOTE — H&P ADULT - ASSESSMENT
This is a 61 yo F PMH significant for Type 1 DM with frequent ICU admissions for DKA, ESRD, HTN admitted for DKA complicated by hyperkalemia and volume overload.

## 2018-02-28 NOTE — ED PROVIDER NOTE - ATTENDING CONTRIBUTION TO CARE
I performed a history and physical exam of the patient and discussed their management with the fellow. I reviewed the fellow's note and agree with the documented findings and plan of care. My medical decision making and observations are found above.

## 2018-02-28 NOTE — ED PROVIDER NOTE - CARE PLAN
Principal Discharge DX:	Diabetic ketoacidosis without coma associated with type 1 diabetes mellitus  Secondary Diagnosis:	Hyperkalemia, diminished renal excretion

## 2018-02-28 NOTE — ED PROVIDER NOTE - CRITICAL CARE PROVIDED
direct patient care (not related to procedure)/interpretation of diagnostic studies/consultation with other physicians/additional history taking/consult w/ pt's family directly relating to pts condition

## 2018-02-28 NOTE — H&P ADULT - PROBLEM SELECTOR PLAN 1
1) Blood glucose q1h  2) Titrate insulin gtt per DKA protocol  3) IVF as tolerated  4) BMP, Mg, Phos, VBG, Acetone q4H  5) Optimize electrolytes: monitor hyperkalemia--plan for HD, replete electrolytes prn  6) Blood culture x2, Urinalysis, Urine culture to r/o infection as precipitating cause  7) Cardiac enzymes to r/o ischemia as precipitating cause  8) Endocrinology consult  9) NPO for now, renal diet when bridged to pump  10) Zofran for nausea as needed

## 2018-02-28 NOTE — H&P ADULT - NSHPSOCIALHISTORY_GEN_ALL_CORE
Tobacco: 30 year history, no longer smoking Tobacco: 30 year history, no longer smoking; Percocet prn

## 2018-02-28 NOTE — ED PROVIDER NOTE - MEDICAL DECISION MAKING DETAILS
62F presentation concerning for DKA, also with hyperK and EKG changes - will treat medically, consult for HD and MICU admission. 62F presentation concerning for DKA, also with hyperK and EKG changes - will treat medically, consult for HD and MICU admission.  Dr. Myers:  Female patient with past hx of DM, HTN, ESRD on hemodialysis, brought due to complaints of nausea/vomiting since this morning. Patient was evaluated by me, found in mild distress, but cooperative with examination. Patient given zofran prior to arrival at ED for treatment of nausea/vomiting. Physical exam revealed bradycardia with normal SBP. POC glucose reading revealed elevated blood glucose and ECG done upon arrival showed peaked T waves. Patient's last dialysis treatment was yesterday. Calcium gluconate was given before lab results. Labs revealed hyperglycemia with elevated anion gap and hyperkalemia. Meds including insulin drip given for treatment of hyperkalemia/hyperglycemia. MICU service agreed with admission to ICU, and nephrology service described patient was to be dialysed once in ICU. Patient with marked improvement of symptoms following treatment at ED.

## 2018-03-01 DIAGNOSIS — E10.22 TYPE 1 DIABETES MELLITUS WITH DIABETIC CHRONIC KIDNEY DISEASE: ICD-10-CM

## 2018-03-01 DIAGNOSIS — I10 ESSENTIAL (PRIMARY) HYPERTENSION: ICD-10-CM

## 2018-03-01 DIAGNOSIS — E10.10 TYPE 1 DIABETES MELLITUS WITH KETOACIDOSIS WITHOUT COMA: ICD-10-CM

## 2018-03-01 DIAGNOSIS — E78.5 HYPERLIPIDEMIA, UNSPECIFIED: ICD-10-CM

## 2018-03-01 LAB
ACETONE SERPL-MCNC: ABNORMAL
ACETONE SERPL-MCNC: NEGATIVE — SIGNIFICANT CHANGE UP
ANION GAP SERPL CALC-SCNC: 14 MMOL/L — SIGNIFICANT CHANGE UP (ref 5–17)
ANION GAP SERPL CALC-SCNC: 16 MMOL/L — SIGNIFICANT CHANGE UP (ref 5–17)
ANION GAP SERPL CALC-SCNC: 17 MMOL/L — SIGNIFICANT CHANGE UP (ref 5–17)
ANION GAP SERPL CALC-SCNC: 18 MMOL/L — HIGH (ref 5–17)
BASOPHILS # BLD AUTO: 0.1 K/UL — SIGNIFICANT CHANGE UP (ref 0–0.2)
BASOPHILS NFR BLD AUTO: 0.6 % — SIGNIFICANT CHANGE UP (ref 0–2)
BUN SERPL-MCNC: 19 MG/DL — SIGNIFICANT CHANGE UP (ref 7–23)
BUN SERPL-MCNC: 20 MG/DL — SIGNIFICANT CHANGE UP (ref 7–23)
BUN SERPL-MCNC: 26 MG/DL — HIGH (ref 7–23)
BUN SERPL-MCNC: 31 MG/DL — HIGH (ref 7–23)
CALCIUM SERPL-MCNC: 7.6 MG/DL — LOW (ref 8.4–10.5)
CALCIUM SERPL-MCNC: 7.8 MG/DL — LOW (ref 8.4–10.5)
CALCIUM SERPL-MCNC: 8.9 MG/DL — SIGNIFICANT CHANGE UP (ref 8.4–10.5)
CALCIUM SERPL-MCNC: 9.1 MG/DL — SIGNIFICANT CHANGE UP (ref 8.4–10.5)
CHLORIDE SERPL-SCNC: 89 MMOL/L — LOW (ref 96–108)
CHLORIDE SERPL-SCNC: 90 MMOL/L — LOW (ref 96–108)
CHLORIDE SERPL-SCNC: 90 MMOL/L — LOW (ref 96–108)
CHLORIDE SERPL-SCNC: 96 MMOL/L — SIGNIFICANT CHANGE UP (ref 96–108)
CK MB BLD-MCNC: 5.2 % — HIGH (ref 0–3.5)
CK MB BLD-MCNC: 6.2 % — HIGH (ref 0–3.5)
CK MB CFR SERPL CALC: 10.5 NG/ML — HIGH (ref 0–3.8)
CK MB CFR SERPL CALC: 11.3 NG/ML — HIGH (ref 0–3.8)
CK SERPL-CCNC: 169 U/L — SIGNIFICANT CHANGE UP (ref 25–170)
CK SERPL-CCNC: 216 U/L — HIGH (ref 25–170)
CO2 SERPL-SCNC: 27 MMOL/L — SIGNIFICANT CHANGE UP (ref 22–31)
CO2 SERPL-SCNC: 27 MMOL/L — SIGNIFICANT CHANGE UP (ref 22–31)
CO2 SERPL-SCNC: 28 MMOL/L — SIGNIFICANT CHANGE UP (ref 22–31)
CO2 SERPL-SCNC: 29 MMOL/L — SIGNIFICANT CHANGE UP (ref 22–31)
CREAT SERPL-MCNC: 2.6 MG/DL — HIGH (ref 0.5–1.3)
CREAT SERPL-MCNC: 2.89 MG/DL — HIGH (ref 0.5–1.3)
CREAT SERPL-MCNC: 4.35 MG/DL — HIGH (ref 0.5–1.3)
CREAT SERPL-MCNC: 4.79 MG/DL — HIGH (ref 0.5–1.3)
EOSINOPHIL # BLD AUTO: 0.1 K/UL — SIGNIFICANT CHANGE UP (ref 0–0.5)
EOSINOPHIL NFR BLD AUTO: 0.7 % — SIGNIFICANT CHANGE UP (ref 0–6)
GLUCOSE BLDC GLUCOMTR-MCNC: 101 MG/DL — HIGH (ref 70–99)
GLUCOSE BLDC GLUCOMTR-MCNC: 114 MG/DL — HIGH (ref 70–99)
GLUCOSE BLDC GLUCOMTR-MCNC: 123 MG/DL — HIGH (ref 70–99)
GLUCOSE BLDC GLUCOMTR-MCNC: 131 MG/DL — HIGH (ref 70–99)
GLUCOSE BLDC GLUCOMTR-MCNC: 136 MG/DL — HIGH (ref 70–99)
GLUCOSE BLDC GLUCOMTR-MCNC: 139 MG/DL — HIGH (ref 70–99)
GLUCOSE BLDC GLUCOMTR-MCNC: 145 MG/DL — HIGH (ref 70–99)
GLUCOSE BLDC GLUCOMTR-MCNC: 162 MG/DL — HIGH (ref 70–99)
GLUCOSE BLDC GLUCOMTR-MCNC: 163 MG/DL — HIGH (ref 70–99)
GLUCOSE BLDC GLUCOMTR-MCNC: 176 MG/DL — HIGH (ref 70–99)
GLUCOSE BLDC GLUCOMTR-MCNC: 181 MG/DL — HIGH (ref 70–99)
GLUCOSE BLDC GLUCOMTR-MCNC: 188 MG/DL — HIGH (ref 70–99)
GLUCOSE BLDC GLUCOMTR-MCNC: 191 MG/DL — HIGH (ref 70–99)
GLUCOSE BLDC GLUCOMTR-MCNC: 195 MG/DL — HIGH (ref 70–99)
GLUCOSE BLDC GLUCOMTR-MCNC: 200 MG/DL — HIGH (ref 70–99)
GLUCOSE BLDC GLUCOMTR-MCNC: 202 MG/DL — HIGH (ref 70–99)
GLUCOSE BLDC GLUCOMTR-MCNC: 211 MG/DL — HIGH (ref 70–99)
GLUCOSE BLDC GLUCOMTR-MCNC: 217 MG/DL — HIGH (ref 70–99)
GLUCOSE BLDC GLUCOMTR-MCNC: 220 MG/DL — HIGH (ref 70–99)
GLUCOSE BLDC GLUCOMTR-MCNC: 223 MG/DL — HIGH (ref 70–99)
GLUCOSE BLDC GLUCOMTR-MCNC: 229 MG/DL — HIGH (ref 70–99)
GLUCOSE BLDC GLUCOMTR-MCNC: 240 MG/DL — HIGH (ref 70–99)
GLUCOSE BLDC GLUCOMTR-MCNC: 265 MG/DL — HIGH (ref 70–99)
GLUCOSE BLDC GLUCOMTR-MCNC: >600 MG/DL — CRITICAL HIGH (ref 70–99)
GLUCOSE SERPL-MCNC: 125 MG/DL — HIGH (ref 70–99)
GLUCOSE SERPL-MCNC: 131 MG/DL — HIGH (ref 70–99)
GLUCOSE SERPL-MCNC: 172 MG/DL — HIGH (ref 70–99)
GLUCOSE SERPL-MCNC: 227 MG/DL — HIGH (ref 70–99)
HAV IGM SER-ACNC: SIGNIFICANT CHANGE UP
HBA1C BLD-MCNC: 7.1 % — HIGH (ref 4–5.6)
HBV CORE AB SER-ACNC: SIGNIFICANT CHANGE UP
HBV CORE IGM SER-ACNC: SIGNIFICANT CHANGE UP
HBV SURFACE AB SER-ACNC: <3 MIU/ML — LOW
HBV SURFACE AG SER-ACNC: SIGNIFICANT CHANGE UP
HCT VFR BLD CALC: 29 % — LOW (ref 34.5–45)
HCV AB S/CO SERPL IA: 0.13 S/CO — SIGNIFICANT CHANGE UP
HCV AB SERPL-IMP: SIGNIFICANT CHANGE UP
HGB BLD-MCNC: 9.6 G/DL — LOW (ref 11.5–15.5)
LYMPHOCYTES # BLD AUTO: 1.2 K/UL — SIGNIFICANT CHANGE UP (ref 1–3.3)
LYMPHOCYTES # BLD AUTO: 13.5 % — SIGNIFICANT CHANGE UP (ref 13–44)
MAGNESIUM SERPL-MCNC: 1.7 MG/DL — SIGNIFICANT CHANGE UP (ref 1.6–2.6)
MAGNESIUM SERPL-MCNC: 1.9 MG/DL — SIGNIFICANT CHANGE UP (ref 1.6–2.6)
MAGNESIUM SERPL-MCNC: 2 MG/DL — SIGNIFICANT CHANGE UP (ref 1.6–2.6)
MCHC RBC-ENTMCNC: 33.2 GM/DL — SIGNIFICANT CHANGE UP (ref 32–36)
MCHC RBC-ENTMCNC: 34.3 PG — HIGH (ref 27–34)
MCV RBC AUTO: 103 FL — HIGH (ref 80–100)
MONOCYTES # BLD AUTO: 0.8 K/UL — SIGNIFICANT CHANGE UP (ref 0–0.9)
MONOCYTES NFR BLD AUTO: 9.2 % — SIGNIFICANT CHANGE UP (ref 2–14)
NEUTROPHILS # BLD AUTO: 6.5 K/UL — SIGNIFICANT CHANGE UP (ref 1.8–7.4)
NEUTROPHILS NFR BLD AUTO: 76.1 % — SIGNIFICANT CHANGE UP (ref 43–77)
PHOSPHATE SERPL-MCNC: 3.2 MG/DL — SIGNIFICANT CHANGE UP (ref 2.5–4.5)
PHOSPHATE SERPL-MCNC: 4.3 MG/DL — SIGNIFICANT CHANGE UP (ref 2.5–4.5)
PHOSPHATE SERPL-MCNC: 5.4 MG/DL — HIGH (ref 2.5–4.5)
PLATELET # BLD AUTO: 273 K/UL — SIGNIFICANT CHANGE UP (ref 150–400)
POTASSIUM SERPL-MCNC: 3.6 MMOL/L — SIGNIFICANT CHANGE UP (ref 3.5–5.3)
POTASSIUM SERPL-MCNC: 4.1 MMOL/L — SIGNIFICANT CHANGE UP (ref 3.5–5.3)
POTASSIUM SERPL-MCNC: 4.7 MMOL/L — SIGNIFICANT CHANGE UP (ref 3.5–5.3)
POTASSIUM SERPL-MCNC: 4.7 MMOL/L — SIGNIFICANT CHANGE UP (ref 3.5–5.3)
POTASSIUM SERPL-SCNC: 3.6 MMOL/L — SIGNIFICANT CHANGE UP (ref 3.5–5.3)
POTASSIUM SERPL-SCNC: 4.1 MMOL/L — SIGNIFICANT CHANGE UP (ref 3.5–5.3)
POTASSIUM SERPL-SCNC: 4.7 MMOL/L — SIGNIFICANT CHANGE UP (ref 3.5–5.3)
POTASSIUM SERPL-SCNC: 4.7 MMOL/L — SIGNIFICANT CHANGE UP (ref 3.5–5.3)
RBC # BLD: 2.81 M/UL — LOW (ref 3.8–5.2)
RBC # FLD: 13 % — SIGNIFICANT CHANGE UP (ref 10.3–14.5)
SODIUM SERPL-SCNC: 134 MMOL/L — LOW (ref 135–145)
SODIUM SERPL-SCNC: 134 MMOL/L — LOW (ref 135–145)
SODIUM SERPL-SCNC: 136 MMOL/L — SIGNIFICANT CHANGE UP (ref 135–145)
SODIUM SERPL-SCNC: 137 MMOL/L — SIGNIFICANT CHANGE UP (ref 135–145)
TROPONIN T SERPL-MCNC: 0.51 NG/ML — HIGH (ref 0–0.06)
TROPONIN T SERPL-MCNC: 0.6 NG/ML — HIGH (ref 0–0.06)
WBC # BLD: 8.6 K/UL — SIGNIFICANT CHANGE UP (ref 3.8–10.5)
WBC # FLD AUTO: 8.6 K/UL — SIGNIFICANT CHANGE UP (ref 3.8–10.5)

## 2018-03-01 PROCEDURE — 93971 EXTREMITY STUDY: CPT | Mod: 26

## 2018-03-01 PROCEDURE — 99291 CRITICAL CARE FIRST HOUR: CPT

## 2018-03-01 PROCEDURE — 99223 1ST HOSP IP/OBS HIGH 75: CPT | Mod: GC

## 2018-03-01 PROCEDURE — 93010 ELECTROCARDIOGRAM REPORT: CPT

## 2018-03-01 RX ORDER — OXYCODONE AND ACETAMINOPHEN 5; 325 MG/1; MG/1
1 TABLET ORAL ONCE
Qty: 0 | Refills: 0 | Status: DISCONTINUED | OUTPATIENT
Start: 2018-03-01 | End: 2018-03-01

## 2018-03-01 RX ORDER — DEXTROSE 50 % IN WATER 50 %
25 SYRINGE (ML) INTRAVENOUS ONCE
Qty: 0 | Refills: 0 | Status: DISCONTINUED | OUTPATIENT
Start: 2018-03-01 | End: 2018-03-02

## 2018-03-01 RX ORDER — INSULIN LISPRO 100/ML
VIAL (ML) SUBCUTANEOUS AT BEDTIME
Qty: 0 | Refills: 0 | Status: DISCONTINUED | OUTPATIENT
Start: 2018-03-01 | End: 2018-03-02

## 2018-03-01 RX ORDER — SODIUM CHLORIDE 9 MG/ML
1000 INJECTION, SOLUTION INTRAVENOUS
Qty: 0 | Refills: 0 | Status: DISCONTINUED | OUTPATIENT
Start: 2018-03-01 | End: 2018-03-01

## 2018-03-01 RX ORDER — DEXTROSE 50 % IN WATER 50 %
1 SYRINGE (ML) INTRAVENOUS ONCE
Qty: 0 | Refills: 0 | Status: DISCONTINUED | OUTPATIENT
Start: 2018-03-01 | End: 2018-03-02

## 2018-03-01 RX ORDER — INSULIN HUMAN 100 [IU]/ML
1 INJECTION, SOLUTION SUBCUTANEOUS
Qty: 100 | Refills: 0 | Status: DISCONTINUED | OUTPATIENT
Start: 2018-03-01 | End: 2018-03-04

## 2018-03-01 RX ORDER — DEXTROSE 10 % IN WATER 10 %
1000 INTRAVENOUS SOLUTION INTRAVENOUS
Qty: 0 | Refills: 0 | Status: DISCONTINUED | OUTPATIENT
Start: 2018-03-01 | End: 2018-03-01

## 2018-03-01 RX ORDER — METOPROLOL TARTRATE 50 MG
25 TABLET ORAL
Qty: 0 | Refills: 0 | Status: DISCONTINUED | OUTPATIENT
Start: 2018-03-01 | End: 2018-03-04

## 2018-03-01 RX ORDER — DULOXETINE HYDROCHLORIDE 30 MG/1
60 CAPSULE, DELAYED RELEASE ORAL DAILY
Qty: 0 | Refills: 0 | Status: DISCONTINUED | OUTPATIENT
Start: 2018-03-01 | End: 2018-03-01

## 2018-03-01 RX ORDER — GLUCAGON INJECTION, SOLUTION 0.5 MG/.1ML
1 INJECTION, SOLUTION SUBCUTANEOUS ONCE
Qty: 0 | Refills: 0 | Status: DISCONTINUED | OUTPATIENT
Start: 2018-03-01 | End: 2018-03-02

## 2018-03-01 RX ORDER — INSULIN LISPRO 100/ML
VIAL (ML) SUBCUTANEOUS
Qty: 0 | Refills: 0 | Status: DISCONTINUED | OUTPATIENT
Start: 2018-03-01 | End: 2018-03-02

## 2018-03-01 RX ORDER — HYDRALAZINE HCL 50 MG
50 TABLET ORAL EVERY 8 HOURS
Qty: 0 | Refills: 0 | Status: DISCONTINUED | OUTPATIENT
Start: 2018-03-01 | End: 2018-03-04

## 2018-03-01 RX ORDER — DEXTROSE 50 % IN WATER 50 %
12.5 SYRINGE (ML) INTRAVENOUS ONCE
Qty: 0 | Refills: 0 | Status: DISCONTINUED | OUTPATIENT
Start: 2018-03-01 | End: 2018-03-02

## 2018-03-01 RX ORDER — INSULIN GLARGINE 100 [IU]/ML
10 INJECTION, SOLUTION SUBCUTANEOUS ONCE
Qty: 0 | Refills: 0 | Status: COMPLETED | OUTPATIENT
Start: 2018-03-01 | End: 2018-03-01

## 2018-03-01 RX ORDER — INSULIN HUMAN 100 [IU]/ML
1 INJECTION, SOLUTION SUBCUTANEOUS
Qty: 100 | Refills: 0 | Status: DISCONTINUED | OUTPATIENT
Start: 2018-03-01 | End: 2018-03-01

## 2018-03-01 RX ORDER — INSULIN LISPRO 100/ML
3 VIAL (ML) SUBCUTANEOUS
Qty: 0 | Refills: 0 | Status: DISCONTINUED | OUTPATIENT
Start: 2018-03-01 | End: 2018-03-02

## 2018-03-01 RX ORDER — SODIUM CHLORIDE 9 MG/ML
1000 INJECTION, SOLUTION INTRAVENOUS
Qty: 0 | Refills: 0 | Status: DISCONTINUED | OUTPATIENT
Start: 2018-03-01 | End: 2018-03-02

## 2018-03-01 RX ORDER — INSULIN GLARGINE 100 [IU]/ML
10 INJECTION, SOLUTION SUBCUTANEOUS AT BEDTIME
Qty: 0 | Refills: 0 | Status: DISCONTINUED | OUTPATIENT
Start: 2018-03-02 | End: 2018-03-02

## 2018-03-01 RX ORDER — HYDRALAZINE HCL 50 MG
10 TABLET ORAL ONCE
Qty: 0 | Refills: 0 | Status: COMPLETED | OUTPATIENT
Start: 2018-03-01 | End: 2018-03-01

## 2018-03-01 RX ADMIN — CINACALCET 60 MILLIGRAM(S): 30 TABLET, FILM COATED ORAL at 15:38

## 2018-03-01 RX ADMIN — INSULIN HUMAN 2 UNIT(S)/HR: 100 INJECTION, SOLUTION SUBCUTANEOUS at 00:03

## 2018-03-01 RX ADMIN — CLOPIDOGREL BISULFATE 75 MILLIGRAM(S): 75 TABLET, FILM COATED ORAL at 21:11

## 2018-03-01 RX ADMIN — Medication 50 MILLIGRAM(S): at 10:49

## 2018-03-01 RX ADMIN — ATORVASTATIN CALCIUM 20 MILLIGRAM(S): 80 TABLET, FILM COATED ORAL at 21:11

## 2018-03-01 RX ADMIN — OXYCODONE AND ACETAMINOPHEN 1 TABLET(S): 5; 325 TABLET ORAL at 20:39

## 2018-03-01 RX ADMIN — INSULIN HUMAN 1 UNIT(S)/HR: 100 INJECTION, SOLUTION SUBCUTANEOUS at 05:04

## 2018-03-01 RX ADMIN — Medication 30 MILLILITER(S): at 01:13

## 2018-03-01 RX ADMIN — INSULIN GLARGINE 10 UNIT(S): 100 INJECTION, SOLUTION SUBCUTANEOUS at 19:42

## 2018-03-01 RX ADMIN — Medication 81 MILLIGRAM(S): at 14:10

## 2018-03-01 RX ADMIN — SODIUM CHLORIDE 50 MILLILITER(S): 9 INJECTION, SOLUTION INTRAVENOUS at 00:04

## 2018-03-01 RX ADMIN — Medication 25 MILLIGRAM(S): at 18:26

## 2018-03-01 RX ADMIN — Medication 10 MILLIGRAM(S): at 04:53

## 2018-03-01 RX ADMIN — Medication 50 MILLIGRAM(S): at 15:41

## 2018-03-01 RX ADMIN — INSULIN HUMAN 0.5 UNIT(S)/HR: 100 INJECTION, SOLUTION SUBCUTANEOUS at 00:17

## 2018-03-01 RX ADMIN — OXYCODONE AND ACETAMINOPHEN 1 TABLET(S): 5; 325 TABLET ORAL at 21:28

## 2018-03-01 RX ADMIN — Medication 50 MILLIGRAM(S): at 23:55

## 2018-03-01 NOTE — CONSULT NOTE ADULT - SUBJECTIVE AND OBJECTIVE BOX
HPI:  This is a 61 yo F PMH Type 1 DM on insulin pump with frequent ICU admissions for DKA, ESRD on HD 4x weekly, HTN, HLD, former smoker, MI, CAD s/p PCI to RCA and brachytherapy in Aug 2017, dCHF (last TTE- November 2017- mild-mod MR, mild AS, mild-mod TR EF40-45%), chronic LLE pain and edema in setting of severe PVD s/p L & R fem-pop bypass  graft, multiple vascular surgery both leg w/ fem-pop bypass revisions , subclavian vein stenosis left s/p stent, CVA with memory deficit brought into the ED via EMS c/o abdominal pain, nausea, and vomiting accompanied by elevated blood glucose.  Per patient's , glucose level noted to be > 400 with subsequent readings higher.  Received Zofran for nausea and normal saline prior to ED arrival. In ED, vital signs 89/37, HR 82, RR 18, O2 Sat 97% on room air. Patient lethargic, but arousable. Lab work remarkable for K 7.7, venous Ph 7.2, HCO3 13 with a glucose level of 1117.  Patient given 10 units IVP regular insulin x 1 and started on insulin gtt. BNP noted to be > 40k.  Dr. Schmidt consulted from nephrology, plan to dialyze patient tonight for K 7.7 with EKG changes/volume overload.  Also received NS 250cc x 1, Ca Gluconate, 1 amp Bicarb, and Albuterol x 1. Transferred to MICU for further management of DKA.     Endocrine History:  Patient has a history of DM Type 1 diagnosed at age 21. Known complications include nephropathy with ESRD on HD, neuropathy, retinopathy, macrovascular complications of CAD. A1c here 7.1%. On medtronic insulin pump for the past 4 years with settings as listed below. States she changes sites every 3 days, reports last site change yesterday morning. Checks glucose 6 x/day, states blood sugars mostly in 100's. Her endocrinologist is Dr. Pina Ley. She received a new insulin pump recently.     Pump Settings:  Basal:  12am 0.275  5am 0.375    I:C 15    Sensitivity:  12AM 60  7AM 40  6PM 60    Target   12am 110-130  8am 100-120    Insulin on board 6 hours    Patient states that she changes her insulin pump site every 3 days. However I looked at her insulin pump - she last filled the reservor at ~ 2am on Wednesday morning. Prior to that, the last time she filled the reservoir was on Thursday night. It appears that she used her infusion set for 5 days. Patient states that due to multiple strokes, she is very forgetful. Her , who is at bedside, also used an insulin pump, the Omnipod - he did not know that the insulin pump needed to be changed every 3 days. Patient should have changed site on Sunday evening, but it appears that she did not change site until Wednesday earning morning. Her insulin pump blood glucose readings reveal that her FS began to rise to the 500's on Monday. She bolused multiple times to correct elevated BG. For example, yesterday afternoon, she bolused 10 units of insulin and then another 8.9 units of insulin. She had persistent nausea and vomiting yesterday and thus came to ED. She also states that she overate in the last several days.    On admission yesterday, she had FS > 600 with Serum glucose of 1117, anion gap of 37, bicarb of 13, BHB of 7.7. She was admitted to MICU on insulin gtt.     PAST MEDICAL & SURGICAL HISTORY:  Subclavian vein stenosis, left: s/p stent  Cellulitis: 2015 left foot  DKA, type 1: 1/2015  ACS (acute coronary syndrome): 1/2015 - cath revealed 100% ostial stenosis not amenable to PCI - medical management  MI, old  TIA (transient ischemic attack): x 2 - 8-9 years ago prior to ASD/VSD repair  CAD (coronary artery disease): s/p stents  Murmur, cardiac  Gout: past  CVA (cerebral infarction): with no residual, 8 yrs ago, prior to heart surgery - ST memory loss  Peripheral vascular disease: occluded left fem-pop bypass 5/2015  Diabetes mellitus type 1: Insulin Dependent - Medtronic  Minimed Paradigm Insulin Pump - Novolog  ESRD (end stage renal disease): dialysis , tue, thursday, saturday  Hyperlipidemia  Status post device closure of ASD: &quot;clamshell&quot;  History of cardiac catheterization: 1/2015 - no intervention  S/P femoral-popliteal bypass surgery: L and R in 2013 with graft; 5/2015 CFA angioplasty left and ileofemoral endarterectomywith vein patch angioplasty of left fem-pop bypass graft  Multiple vascular surgery both leg, left fempop bypass revision 11/2015  AV (arteriovenous fistula): Left AV graft; revision with stent placement 2-3 years ago  S/P cholecystectomy      FAMILY HISTORY:  Family history of smoking  Family history of hypertension  Family history of cancer (Sibling)      Social History:  Former smoker, quit 19 years ago  No alcohol use  Lives with     Outpatient Medications:   	hydrALAZINE 50 mg oral tablet: 1 tab(s) orally every 8 hours  · 	cinacalcet 60 mg oral tablet: 1 tab(s) orally once a day  · 	metoprolol tartrate 25 mg oral tablet: 1 tab(s) orally 2 times a day  · 	oxyCODONE-acetaminophen 5 mg-325 mg oral tablet: 1 tab(s) orally every 6 hours, As needed, Severe Pain (7 - 10)  · 	atorvastatin 20 mg oral tablet: 1 tab(s) orally once a day (at bedtime)  · 	aspirin 81 mg oral delayed release tablet: 1 tab(s) orally once a day  · 	clopidogrel 75 mg oral tablet: 1 tab(s) orally once a day (at bedtime)  · 	DULoxetine 60 mg oral delayed release capsule: 1 cap(s) orally every other day  · 	lisinopril 40 mg oral tablet: 1 tab(s) orally once a day      MEDICATIONS  (STANDING):  aspirin enteric coated 81 milliGRAM(s) Oral daily  atorvastatin 20 milliGRAM(s) Oral at bedtime  cinacalcet 60 milliGRAM(s) Oral daily  clopidogrel Tablet 75 milliGRAM(s) Oral at bedtime  dextrose 20% 1000 milliLiter(s) 1000 milliLiter(s) (15 mL/Hr) IV Continuous <Continuous>  heparin  Injectable 5000 Unit(s) SubCutaneous every 8 hours  hydrALAZINE 50 milliGRAM(s) Oral every 8 hours  insulin Infusion 1 Unit(s)/Hr (1 mL/Hr) IV Continuous <Continuous>  metoprolol     tartrate 25 milliGRAM(s) Oral two times a day    MEDICATIONS  (PRN):      Allergies  No Known Allergies      Review of Systems:  Constitutional: No fever  Eyes: + blurry vision  Neuro: No tremors  HEENT: No pain  Cardiovascular: No chest pain, palpitations  Respiratory: No SOB, no cough  GI: No nausea, vomiting, abdominal pain  : No dysuria (makes minimal urine)  Skin: no rash  Endocrine: no polyuria, no polydipsia    ALL OTHER SYSTEMS REVIEWED AND NEGATIVE    PHYSICAL EXAM:  VITALS: T(C): 36.9 (03-01-18 @ 12:00)  T(F): 98.5 (03-01-18 @ 12:00), Max: 98.6 (03-01-18 @ 08:00)  HR: 79 (03-01-18 @ 12:00) (46 - 89)  BP: 139/63 (03-01-18 @ 12:00) (88/48 - 184/81)  RR:  (14 - 25)  SpO2:  (89% - 100%)  Wt(kg): --  GENERAL: NAD, well-developed  EYES: No proptosis, anicteric  HEENT:  Atraumatic, Normocephalic, dry mucous membranes  THYROID: Normal size, no palpable nodules  RESPIRATORY: Clear to auscultation bilaterally; No rales, rhonchi, wheezing  CARDIOVASCULAR: Regular rate and rhythm; No murmurs; 2+ pitting edema LLE  GI: Soft, nontender, non distended, normal bowel sounds  SKIN: Dry, intact  PSYCH: Alert and oriented x 3, reactive affect      POCT Blood Glucose.: 217 mg/dL (03-01-18 @ 12:35)  POCT Blood Glucose.: 200 mg/dL (03-01-18 @ 11:31)  POCT Blood Glucose.: 229 mg/dL (03-01-18 @ 10:47)  POCT Blood Glucose.: 202 mg/dL (03-01-18 @ 09:30)  POCT Blood Glucose.: 220 mg/dL (03-01-18 @ 08:11)  POCT Blood Glucose.: 162 mg/dL (03-01-18 @ 06:52)  POCT Blood Glucose.: 223 mg/dL (03-01-18 @ 06:00)  POCT Blood Glucose.: 265 mg/dL (03-01-18 @ 04:56)  POCT Blood Glucose.: 191 mg/dL (03-01-18 @ 04:00)  POCT Blood Glucose.: 188 mg/dL (03-01-18 @ 02:55)  POCT Blood Glucose.: 114 mg/dL (03-01-18 @ 02:01)  POCT Blood Glucose.: 101 mg/dL (03-01-18 @ 01:58)  POCT Blood Glucose.: 163 mg/dL (03-01-18 @ 01:01)  POCT Blood Glucose.: 211 mg/dL (02-28-18 @ 23:59)  POCT Blood Glucose.: 189 mg/dL (02-28-18 @ 23:53)  POCT Blood Glucose.: 372 mg/dL (02-28-18 @ 22:49)  POCT Blood Glucose.: >600 mg/dL (02-28-18 @ 21:25)  POCT Blood Glucose.: >600 mg/dL (02-28-18 @ 20:11)  POCT Blood Glucose.: >600 mg/dL (02-28-18 @ 18:47)  POCT Blood Glucose.: >600 mg/dL (02-28-18 @ 17:27)                          8.8    9.0   )-----------( 230      ( 28 Feb 2018 21:42 )             25.8       03-01    137  |  96  |  19  ----------------------------<  227<H>  3.6   |  27  |  2.89<H>    EGFR if : 20<L>  EGFR if non : 17<L>    Ca    7.6<L>      03-01  Mg     1.7     03-01  Phos  4.3     03-01    TPro  7.0  /  Alb  4.1  /  TBili  0.7  /  DBili  x   /  AST  46<H>  /  ALT  24  /  AlkPhos  147<H>  02-28      Hemoglobin A1C, Whole Blood: 7.1 % <H> [4.0 - 5.6] (03-01-18 @ 01:59) HPI:  This is a 63 yo F PMH Type 1 DM on insulin pump with frequent ICU admissions for DKA, ESRD on HD 4x weekly, HTN, HLD, former smoker, MI, CAD s/p PCI to RCA and brachytherapy in Aug 2017, dCHF (last TTE- November 2017- mild-mod MR, mild AS, mild-mod TR EF40-45%), chronic LLE pain and edema in setting of severe PVD s/p L & R fem-pop bypass  graft, multiple vascular surgery both leg w/ fem-pop bypass revisions , subclavian vein stenosis left s/p stent, CVA with memory deficit brought into the ED via EMS c/o abdominal pain, nausea, and vomiting accompanied by elevated blood glucose.  Per patient's , glucose level noted to be > 400 with subsequent readings higher.  Received Zofran for nausea and normal saline prior to ED arrival. In ED, vital signs 89/37, HR 82, RR 18, O2 Sat 97% on room air. Patient lethargic, but arousable. Lab work remarkable for K 7.7, venous Ph 7.2, HCO3 13 with a glucose level of 1117.  Patient given 10 units IVP regular insulin x 1 and started on insulin gtt. BNP noted to be > 40k.  Dr. Schmidt consulted from nephrology, plan to dialyze patient tonight for K 7.7 with EKG changes/volume overload.  Also received NS 250cc x 1, Ca Gluconate, 1 amp Bicarb, and Albuterol x 1. Transferred to MICU for further management of DKA.     Endocrine History:  Patient has a history of DM Type 1 diagnosed at age 21. Known complications include nephropathy with ESRD on HD, neuropathy, retinopathy, macrovascular complications of CAD. A1c here 7.1%. On medtronic insulin pump for the past 4 years with settings as listed below. States she changes sites every 3 days, reports last site change yesterday morning. Checks glucose 6 x/day, states blood sugars mostly in 100's. Her endocrinologist is Dr. Pina Ley. She received a new insulin pump recently.     Pump Settings:  Basal:  12am 0.275  5am 0.375  Total Basal 8.5    I:C 15    Sensitivity:  12AM 60  7AM 40  6PM 60    Target   12am 110-130  8am 100-120    Insulin on board 6 hours    Patient states that she changes her insulin pump site every 3 days. However I looked at her insulin pump - she last filled the reservor at ~ 2am on Wednesday morning. Prior to that, the last time she filled the reservoir was on Thursday night. It appears that she used her infusion set for 5 days. Patient states that due to multiple strokes, she is very forgetful. Her , who is at bedside, also used an insulin pump, the Omnipod - he did not know that the insulin pump needed to be changed every 3 days. Patient should have changed site on Sunday evening, but it appears that she did not change site until Wednesday earning morning. Her insulin pump blood glucose readings reveal that her FS began to rise to the 500's on Monday. She bolused multiple times to correct elevated BG. For example, yesterday afternoon, she bolused 10 units of insulin and then another 8.9 units of insulin. She had persistent nausea and vomiting yesterday and thus came to ED. She also states that she overate in the last several days.    On admission yesterday, she had FS > 600 with Serum glucose of 1117, anion gap of 37, bicarb of 13, BHB of 7.7. She was admitted to MICU on insulin gtt. She was on D20 earlier today, which was stopped at 12 pm. She is now on an insulin gtt at 1 unit an hour and eating while on insulin gtt.    PAST MEDICAL & SURGICAL HISTORY:  Subclavian vein stenosis, left: s/p stent  Cellulitis: 2015 left foot  DKA, type 1: 1/2015  ACS (acute coronary syndrome): 1/2015 - cath revealed 100% ostial stenosis not amenable to PCI - medical management  MI, old  TIA (transient ischemic attack): x 2 - 8-9 years ago prior to ASD/VSD repair  CAD (coronary artery disease): s/p stents  Murmur, cardiac  Gout: past  CVA (cerebral infarction): with no residual, 8 yrs ago, prior to heart surgery - ST memory loss  Peripheral vascular disease: occluded left fem-pop bypass 5/2015  Diabetes mellitus type 1: Insulin Dependent - Medtronic  Minimed Paradigm Insulin Pump - Novolog  ESRD (end stage renal disease): dialysis , tue, thursday, saturday  Hyperlipidemia  Status post device closure of ASD: &quot;clamshell&quot;  History of cardiac catheterization: 1/2015 - no intervention  S/P femoral-popliteal bypass surgery: L and R in 2013 with graft; 5/2015 CFA angioplasty left and ileofemoral endarterectomywith vein patch angioplasty of left fem-pop bypass graft  Multiple vascular surgery both leg, left fempop bypass revision 11/2015  AV (arteriovenous fistula): Left AV graft; revision with stent placement 2-3 years ago  S/P cholecystectomy      FAMILY HISTORY:  Family history of smoking  Family history of hypertension  Family history of cancer (Sibling)      Social History:  Former smoker, quit 19 years ago  No alcohol use  Lives with     Outpatient Medications:   	hydrALAZINE 50 mg oral tablet: 1 tab(s) orally every 8 hours  · 	cinacalcet 60 mg oral tablet: 1 tab(s) orally once a day  · 	metoprolol tartrate 25 mg oral tablet: 1 tab(s) orally 2 times a day  · 	oxyCODONE-acetaminophen 5 mg-325 mg oral tablet: 1 tab(s) orally every 6 hours, As needed, Severe Pain (7 - 10)  · 	atorvastatin 20 mg oral tablet: 1 tab(s) orally once a day (at bedtime)  · 	aspirin 81 mg oral delayed release tablet: 1 tab(s) orally once a day  · 	clopidogrel 75 mg oral tablet: 1 tab(s) orally once a day (at bedtime)  · 	DULoxetine 60 mg oral delayed release capsule: 1 cap(s) orally every other day  · 	lisinopril 40 mg oral tablet: 1 tab(s) orally once a day      MEDICATIONS  (STANDING):  aspirin enteric coated 81 milliGRAM(s) Oral daily  atorvastatin 20 milliGRAM(s) Oral at bedtime  cinacalcet 60 milliGRAM(s) Oral daily  clopidogrel Tablet 75 milliGRAM(s) Oral at bedtime  dextrose 20% 1000 milliLiter(s) 1000 milliLiter(s) (15 mL/Hr) IV Continuous <Continuous>  heparin  Injectable 5000 Unit(s) SubCutaneous every 8 hours  hydrALAZINE 50 milliGRAM(s) Oral every 8 hours  insulin Infusion 1 Unit(s)/Hr (1 mL/Hr) IV Continuous <Continuous>  metoprolol     tartrate 25 milliGRAM(s) Oral two times a day    MEDICATIONS  (PRN):      Allergies  No Known Allergies      Review of Systems:  Constitutional: No fever  Eyes: + blurry vision  Neuro: No tremors  HEENT: No pain  Cardiovascular: No chest pain, palpitations  Respiratory: No SOB, no cough  GI: No nausea, vomiting, abdominal pain  : No dysuria (makes minimal urine)  Skin: no rash  Endocrine: no polyuria, no polydipsia    ALL OTHER SYSTEMS REVIEWED AND NEGATIVE    PHYSICAL EXAM:  VITALS: T(C): 36.9 (03-01-18 @ 12:00)  T(F): 98.5 (03-01-18 @ 12:00), Max: 98.6 (03-01-18 @ 08:00)  HR: 79 (03-01-18 @ 12:00) (46 - 89)  BP: 139/63 (03-01-18 @ 12:00) (88/48 - 184/81)  RR:  (14 - 25)  SpO2:  (89% - 100%)  Wt(kg): --  GENERAL: NAD, well-developed  EYES: No proptosis, anicteric  HEENT:  Atraumatic, Normocephalic, dry mucous membranes  THYROID: Normal size, no palpable nodules  RESPIRATORY: Clear to auscultation bilaterally; No rales, rhonchi, wheezing  CARDIOVASCULAR: Regular rate and rhythm; No murmurs; 2+ pitting edema LLE  GI: Soft, nontender, non distended, normal bowel sounds  SKIN: Dry, intact  PSYCH: Alert and oriented x 3, reactive affect      POCT Blood Glucose.: 217 mg/dL (03-01-18 @ 12:35)  POCT Blood Glucose.: 200 mg/dL (03-01-18 @ 11:31)  POCT Blood Glucose.: 229 mg/dL (03-01-18 @ 10:47)  POCT Blood Glucose.: 202 mg/dL (03-01-18 @ 09:30)  POCT Blood Glucose.: 220 mg/dL (03-01-18 @ 08:11)  POCT Blood Glucose.: 162 mg/dL (03-01-18 @ 06:52)  POCT Blood Glucose.: 223 mg/dL (03-01-18 @ 06:00)  POCT Blood Glucose.: 265 mg/dL (03-01-18 @ 04:56)  POCT Blood Glucose.: 191 mg/dL (03-01-18 @ 04:00)  POCT Blood Glucose.: 188 mg/dL (03-01-18 @ 02:55)  POCT Blood Glucose.: 114 mg/dL (03-01-18 @ 02:01)  POCT Blood Glucose.: 101 mg/dL (03-01-18 @ 01:58)  POCT Blood Glucose.: 163 mg/dL (03-01-18 @ 01:01)  POCT Blood Glucose.: 211 mg/dL (02-28-18 @ 23:59)  POCT Blood Glucose.: 189 mg/dL (02-28-18 @ 23:53)  POCT Blood Glucose.: 372 mg/dL (02-28-18 @ 22:49)  POCT Blood Glucose.: >600 mg/dL (02-28-18 @ 21:25)  POCT Blood Glucose.: >600 mg/dL (02-28-18 @ 20:11)  POCT Blood Glucose.: >600 mg/dL (02-28-18 @ 18:47)  POCT Blood Glucose.: >600 mg/dL (02-28-18 @ 17:27)                          8.8    9.0   )-----------( 230      ( 28 Feb 2018 21:42 )             25.8       03-01    137  |  96  |  19  ----------------------------<  227<H>  3.6   |  27  |  2.89<H>    EGFR if : 20<L>  EGFR if non : 17<L>    Ca    7.6<L>      03-01  Mg     1.7     03-01  Phos  4.3     03-01    TPro  7.0  /  Alb  4.1  /  TBili  0.7  /  DBili  x   /  AST  46<H>  /  ALT  24  /  AlkPhos  147<H>  02-28      Hemoglobin A1C, Whole Blood: 7.1 % <H> [4.0 - 5.6] (03-01-18 @ 01:59)

## 2018-03-01 NOTE — CONSULT NOTE ADULT - SUBJECTIVE AND OBJECTIVE BOX
HPI:  This is a 63 yo F PMH Type 1 DM on insulin pump with frequent ICU admissions for DKA, ESRD on HD 4x weekly, HTN, HLD, former smoker, MI, CAD s/p PCI to RCA and brachytherapy in Aug 2017, dCHF (last TTE- November 2017- mild-mod MR, mild AS, mild-mod TR EF40-45%), chronic LLE pain and edema in setting of severe PVD s/p L & R fem-pop bypass  graft, multiple vascular surgery both leg w/ fem-pop bypass revisions , subclavian vein stenosis left s/p stent, CVA with memory deficit brought into the ED via EMS c/o abdominal pain, nausea, and vomiting accompanied by elevated blood glucose.  Per patient's , glucose level noted to be > 400 with subsequent readings higher.  Received Zofran for nausea and normal saline prior to ED arrival. In ED, vital signs 89/37, HR 82, RR 18, O2 Sat 97% on room air. Patient lethargic, but arousable. Lab work remarkable for K 7.7, venous Ph 7.2, HCO3 13 with a glucose level of 1117.  Patient given 10 units IVP regular insulin x 1 and started on insulin gtt. BNP noted to be > 40k.  Dr. Schmidt consulted from nephrology, plan to dialyze patient tonight for K 7.7 with EKG changes/volume overload.  Also received NS 250cc x 1, Ca Gluconate, 1 amp Bicarb, and Albuterol x 1. Transferred to MICU for further management of DKA. (28 Feb 2018 19:08)      PAST MEDICAL & SURGICAL HISTORY:  Subclavian vein stenosis, left: s/p stent  Cellulitis: 2015 left foot  DKA, type 1: 1/2015  ACS (acute coronary syndrome): 1/2015 - cath revealed 100% ostial stenosis not amenable to PCI - medical management  MI, old  TIA (transient ischemic attack): x 2 - 8-9 years ago prior to ASD/VSD repair  CAD (coronary artery disease): s/p stents  Murmur, cardiac  Gout: past  CVA (cerebral infarction): with no residual, 8 yrs ago, prior to heart surgery - ST memory loss  Peripheral vascular disease: occluded left fem-pop bypass 5/2015  Diabetes mellitus type 1: Insulin Dependent - [employer]  Minimed Paradigm Insulin Pump - Novolog  ESRD (end stage renal disease): dialysis , tue, thursday, saturday  Hyperlipidemia  Status post device closure of ASD: &quot;[place]&quot;  History of cardiac catheterization: 1/2015 - no intervention  S/P femoral-popliteal bypass surgery: L and R in 2013 with graft; 5/2015 CFA angioplasty left and ileofemoral endarterectomywith vein patch angioplasty of left fem-pop bypass graft  Multiple vascular surgery both leg, left fempop bypass revision 11/2015  AV (arteriovenous fistula): Left AV graft; revision with stent placement 2-3 years ago  S/P cholecystectomy      Review of Systems:   CONSTITUTIONAL: No fever, weight loss, or fatigue  EYES: No eye pain, visual disturbances, or discharge  ENMT:  No difficulty hearing, tinnitus, vertigo; No sinus or throat pain  NECK: No pain or stiffness  BREASTS: No pain, masses, or nipple discharge  RESPIRATORY: No cough, wheezing, chills or hemoptysis; No shortness of breath  CARDIOVASCULAR: No chest pain, palpitations, dizziness, or leg swelling  GASTROINTESTINAL: No abdominal or epigastric pain. No nausea, vomiting, or hematemesis; No diarrhea or constipation. No melena or hematochezia.  GENITOURINARY: No dysuria, frequency, hematuria, or incontinence  NEUROLOGICAL: No headaches, memory loss, loss of strength, numbness, or tremors  SKIN: No itching, burning, rashes, or lesions   LYMPH NODES: No enlarged glands  ENDOCRINE: No heat or cold intolerance; No hair loss  MUSCULOSKELETAL: No joint pain or swelling; No muscle, back, or extremity pain  PSYCHIATRIC: No depression, anxiety, mood swings, or difficulty sleeping  HEME/LYMPH: No easy bruising, or bleeding gums  ALLERY AND IMMUNOLOGIC: No hives or eczema    Allergies    No Known Allergies    Intolerances        Social History:     FAMILY HISTORY:  Family history of smoking  Family history of hypertension  Family history of cancer (Sibling)      MEDICATIONS  (STANDING):  aspirin enteric coated 81 milliGRAM(s) Oral daily  atorvastatin 20 milliGRAM(s) Oral at bedtime  cinacalcet 60 milliGRAM(s) Oral daily  clopidogrel Tablet 75 milliGRAM(s) Oral at bedtime  dextrose 20% 1000 milliLiter(s) 1000 milliLiter(s) (15 mL/Hr) IV Continuous <Continuous>  heparin  Injectable 5000 Unit(s) SubCutaneous every 8 hours  hydrALAZINE 50 milliGRAM(s) Oral every 8 hours  insulin Infusion 1 Unit(s)/Hr (1 mL/Hr) IV Continuous <Continuous>  metoprolol     tartrate 25 milliGRAM(s) Oral two times a day    MEDICATIONS  (PRN):        CAPILLARY BLOOD GLUCOSE      POCT Blood Glucose.: 131 mg/dL (01 Mar 2018 15:46)  POCT Blood Glucose.: 181 mg/dL (01 Mar 2018 14:16)  POCT Blood Glucose.: 217 mg/dL (01 Mar 2018 12:35)  POCT Blood Glucose.: 200 mg/dL (01 Mar 2018 11:31)  POCT Blood Glucose.: 229 mg/dL (01 Mar 2018 10:47)  POCT Blood Glucose.: 202 mg/dL (01 Mar 2018 09:30)  POCT Blood Glucose.: 220 mg/dL (01 Mar 2018 08:11)  POCT Blood Glucose.: 162 mg/dL (01 Mar 2018 06:52)  POCT Blood Glucose.: 223 mg/dL (01 Mar 2018 06:00)  POCT Blood Glucose.: 265 mg/dL (01 Mar 2018 04:56)  POCT Blood Glucose.: 191 mg/dL (01 Mar 2018 04:00)  POCT Blood Glucose.: 188 mg/dL (01 Mar 2018 02:55)  POCT Blood Glucose.: 114 mg/dL (01 Mar 2018 02:01)  POCT Blood Glucose.: 101 mg/dL (01 Mar 2018 01:58)  POCT Blood Glucose.: 163 mg/dL (01 Mar 2018 01:01)  POCT Blood Glucose.: 211 mg/dL (28 Feb 2018 23:59)  POCT Blood Glucose.: 189 mg/dL (28 Feb 2018 23:53)  POCT Blood Glucose.: 372 mg/dL (28 Feb 2018 22:49)  POCT Blood Glucose.: >600 mg/dL (28 Feb 2018 21:25)  POCT Blood Glucose.: >600 mg/dL (28 Feb 2018 20:11)  POCT Blood Glucose.: >600 mg/dL (28 Feb 2018 18:47)  POCT Blood Glucose.: >600 mg/dL (28 Feb 2018 17:27)    I&O's Summary    28 Feb 2018 07:01  -  01 Mar 2018 07:00  --------------------------------------------------------  IN: 1220.5 mL / OUT: 2400 mL / NET: -1179.5 mL    01 Mar 2018 07:01  -  01 Mar 2018 16:33  --------------------------------------------------------  IN: 65 mL / OUT: 10 mL / NET: 55 mL        PHYSICAL EXAM:  GENERAL: NAD  HEAD:  Atraumatic, Normocephalic  EYES: EOMI, PERRLA, conjunctiva and sclera clear  NECK: Supple, No JVD  CHEST/LUNG: Clear to auscultation bilaterally; No wheeze  HEART: Regular rate and rhythm; No murmurs, rubs, or gallops  ABDOMEN: Soft, Nontender, Nondistended; Bowel sounds present  EXTREMITIES:  2+ Peripheral Pulses, No clubbing, cyanosis, or edema  NEUROLOGY: non-focal  SKIN: No rashes or lesions    LABS:                        8.8    9.0   )-----------( 230      ( 28 Feb 2018 21:42 )             25.8     03-01    137  |  96  |  19  ----------------------------<  227<H>  3.6   |  27  |  2.89<H>    Ca    7.6<L>      01 Mar 2018 06:10  Phos  4.3     03-01  Mg     1.7     03-01    TPro  7.0  /  Alb  4.1  /  TBili  0.7  /  DBili  x   /  AST  46<H>  /  ALT  24  /  AlkPhos  147<H>  02-28    PT/INR - ( 28 Feb 2018 21:52 )   PT: 11.7 sec;   INR: 1.07 ratio         PTT - ( 28 Feb 2018 17:46 )  PTT:29.5 sec  CARDIAC MARKERS ( 01 Mar 2018 06:10 )  x     / 0.60 ng/mL / 169 U/L / x     / 10.5 ng/mL  CARDIAC MARKERS ( 01 Mar 2018 02:44 )  x     / 0.51 ng/mL / 216 U/L / x     / 11.3 ng/mL  CARDIAC MARKERS ( 28 Feb 2018 21:42 )  x     / 0.22 ng/mL / 155 U/L / x     / 7.6 ng/mL  CARDIAC MARKERS ( 28 Feb 2018 17:46 )  x     / 0.14 ng/mL / 152 U/L / x     / 4.7 ng/mL          RADIOLOGY & ADDITIONAL TESTS:    Imaging Personally Reviewed:  < from: Xray Chest 1 View AP/PA (02.28.18 @ 18:15) >  IMPRESSION: Clear lungs.    < end of copied text >  < from: [facility] Duplex Lower Ext Vein Scan, Left (03.01.18 @ 13:43) >  IMPRESSION:     No evidence of left lower extremity deep venous thrombosis.      < end of copied text >    Consultant(s) Notes Reviewed:      Care Discussed with Consultants/Other Providers:

## 2018-03-01 NOTE — PROGRESS NOTE ADULT - ASSESSMENT
61 yo F PMH Type 1 DM on insulin pump with frequent ICU admissions for DKA, ESRD on HD 4x weekly, HTN, HLD, former smoker, MI, CAD s/p PCI to RCA and brachytherapy in Aug 2017, dCHF (last TTE- November 2017- mild-mod MR, mild AS, mild-mod TR EF40-45%), chronic LLE pain and edema in setting of severe PVD s/p L & R fem-pop bypass  graft, multiple vascular surgery both leg w/ fem-pop bypass revisions , subclavian vein stenosis left s/p stent, CVA now with hyperkalemia acidosis DKA lactic acidosis esrd with hypotension   1- hyperkalemia resolved after glucose improvement and hd  2- acidosis improvin  3- DKA   4-htn today   5- shpt   insulin drip    hyrdalazine 25mg tid   endo consult  was called for recurrent DKA   HD am   d/w icu team

## 2018-03-01 NOTE — CONSULT NOTE ADULT - PROBLEM SELECTOR PROBLEM 1
Uncontrolled type 1 diabetes mellitus with chronic kidney disease on chronic dialysis DKA, type 1, not at goal

## 2018-03-01 NOTE — CONSULT NOTE ADULT - PROBLEM SELECTOR RECOMMENDATION 2
- if anion gap remains closed, recommend resume insulin pump at 120% temporary basal.   - patient and family will need insulin pump education by our CDE.  - consistent carb diet  - will follow  - outpatient follow up with Dr. Pina Ley - if anion gap remains closed, recommend resume insulin pump at 115% temporary basal.   - patient and family will need insulin pump education by our CDE.  - consistent carb diet  - will follow  - outpatient follow up with Dr. Pina Ley

## 2018-03-01 NOTE — CONSULT NOTE ADULT - ATTENDING COMMENTS
Ms. Cui is a DM Type 1 patient with frequent hospitalization with DKA. On further interrogation of the insulin pump it appears that the reservoir was not changed for >5 days and subsequently since Monday of this week patient has been hyperglycemic in the 500s and had been bolusing to correct it. She subsequently presented with DKA yesterday morning, now has had AG closed. However would prefer another BMP before transitioning her to the pump ( with 2 hour overlap with the drip). A temp basal setting has been placed for her providing her with 15% extra given that her requirements in the hospital have been slightly higher. Will continue to follow her inpatient.

## 2018-03-01 NOTE — CONSULT NOTE ADULT - PROBLEM SELECTOR RECOMMENDATION 9
- suspect that DKA occurred in setting of incorrect insulin pump use. Patient states that she changes her site every 3 days, but she also reports being forgetful. Her , who helps to manage patient, also was not aware that site had to be changed every 3 days  - recommend repeat BMP now to ensure that anion gap has remained closed and bicarb in normal range  - if anion gap remains closed, resume insulin pump at 120% temporary basal - place insulin pump on, then stop insulin gtt 1-2 hours later. Please check FS 1 hour after insulin pump has been placed back on  - if anion gap has re-opened, please c/w insulin gtt. check BMP q4 hours and FS 1 hr as per DKA protocol  - will follow - suspect that DKA occurred in setting of incorrect insulin pump use. Patient states that she changes her site every 3 days, but she also reports being forgetful. Her , who helps to manage patient, also was not aware that site had to be changed every 3 days  - recommend repeat BMP now to ensure that anion gap has remained closed and bicarb in normal range  - if anion gap remains closed, resume insulin pump at 115% temporary basal - place insulin pump on, then stop insulin gtt 1-2 hours later. Please check FS 1 hour after insulin pump has been placed back on  - if anion gap has re-opened, please c/w insulin gtt. check BMP q4 hours and FS 1 hr as per DKA protocol  - will follow

## 2018-03-01 NOTE — PROGRESS NOTE ADULT - ASSESSMENT
62F w/PMHx HTN, HLD, T1DM (insulin pump), CAD s/p PCI, PAD s/p L fem-pop bypass, TIA/CVA w/o residual deficits, with frequent ICU admissions for DKA, ESRD, HTN emergency admitted for DKA c/b hyperkalemia and volume overload.    Neuro:  -    CV:  -    Pulm:  -    Renal:  -    GI:  -    ID:  -    Heme:  -    Endo:  -    DVT ppx: 62F w/PMHx HTN, HLD, T1DM (insulin pump), CAD/MI s/p PCI, HFrEF (EF 40-45%), PAD s/p b/l fem-pop bypass and stent for subclavian v stenosis, ESRD (HD 4x/week), TIA/CVA w/residual "memory" deficit, with frequent ICU admissions for DKA, ESRD, HTN emergency admitted for DKA c/b hyperkalemia and volume overload.    Neuro:  -    CV:  -    Pulm:  -    Renal:  -    GI:  -    ID:  -    Heme:  -    Endo:  -    DVT ppx: 62F w/PMHx HTN, HLD, T1DM (insulin pump), CAD/MI s/p PCI, HFrEF (EF 40-45%), PAD s/p b/l fem-pop bypass and stent for subclavian v stenosis, ESRD (HD 4x/week), TIA/CVA w/residual "memory" deficit, with frequent ICU admissions for DKA, ESRD, HTN emergency admitted for DKA c/b hyperkalemia and volume overload.    Neuro:  - Stable, no active issues    CV:  - Hemodynamically stable not req pressors  - BP elev after restarting home hydralazine,     Pulm:  -    Renal:  -    GI:  -    ID:  -    Heme:  -    Endo:  -    DVT ppx: 62F w/PMHx HTN, HLD, T1DM (insulin pump), CAD/MI s/p PCI, HFrEF (EF 40-45%), PAD s/p b/l fem-pop bypass and stent for subclavian v stenosis, ESRD (HD 4x/week), TIA/CVA w/residual "memory" deficit, with frequent ICU admissions for DKA, ESRD, HTN emergency admitted for DKA c/b hyperkalemia and volume overload.    Neuro:  - Stable, no active issues    CV:  - Hemodynamically stable not req pressors  - BP elev after restarting home hydralazine, will restart home coreg now. If stable later today will restart home lisinopril  - Mild CE elev likely related to ESRD, no alarming EKG changes, will stop trending CE  - C/w DAPT, high-intensity statin. Restart BB and ACE-i as noted above  - S/p HD yest w/1.5L fluid removal, clinically euvolemic now. Cont to monitor strict I/O  - LLE consistent w/vascular claudication, good cap refill, no urgency, can f/u as outpt. LE swelling reportedly chronic, will check LE duplex    Pulm:  - Stable, no active issues    Renal:  - S/p HD yest w/1.5L vol removal, electrolytes normalized (no longer hyperkalemic or uremic)  - Monitor strict I/O, trend BMP  - Renal recs apprec. Will d/w them possibly switching sensipar to alternative phos-binder as EKG w/mildly prolonged QTc    GI:  -    ID:  - Afebrile, no leukocytosis, no SIRS. No overt infxn, bld cx NGTD, will check UA w/next urination (oliguric), monitor off abx    Heme:  -    Endo:  -    DVT ppx: 62F w/PMHx HTN, HLD, T1DM (insulin pump), CAD/MI s/p PCI, HFrEF (EF 40-45%), PAD s/p b/l fem-pop bypass and stent for subclavian v stenosis, ESRD (HD 4x/week), TIA/CVA w/residual "memory" deficit, with frequent ICU admissions for DKA, ESRD, HTN emergency admitted for DKA c/b hyperkalemia and volume overload.    Neuro:  - Stable, no active issues    CV:  - Hemodynamically stable not req pressors  - BP elev after restarting home hydralazine, will restart home coreg now. If stable later today will restart home lisinopril  - Mild CE elev likely related to ESRD, no alarming EKG changes, will stop trending CE  - C/w DAPT, high-intensity statin. Restart BB and ACE-i as noted above  - S/p HD yest w/1.5L fluid removal, clinically euvolemic now. Cont to monitor strict I/O  - LLE consistent w/vascular claudication, good cap refill, no urgency, can f/u as outpt. LE swelling reportedly chronic, will check LE duplex    Pulm:  - Stable, no active issues    Renal:  - S/p HD yest w/1.5L vol removal, electrolytes normalized (no longer hyperkalemic or uremic)  - Monitor strict I/O, trend BMP  - Renal recs apprec. Will d/w them possibly switching sensipar to alternative phos-binder as EKG w/mildly prolonged QTc    GI:  - Abd pain, N/V likely 2/2 DKA given sx now resolved, less likely viral gastroenteritis  -     ID:  - Afebrile, no leukocytosis, no SIRS. No overt infxn, bld cx NGTD, will check UA w/next urination (oliguric), monitor off abx    Heme:  - Stable, no active issues (chronic stable anemia likely related to AOCKD)    Endo:  -    DVT ppx: 62F w/PMHx HTN, HLD, T1DM (insulin pump), CAD/MI s/p PCI, HFrEF (EF 40-45%), PAD s/p b/l fem-pop bypass and stent for subclavian v stenosis, ESRD (HD 4x/week), TIA/CVA w/residual "memory" deficit, with frequent ICU admissions for DKA, ESRD, HTN emergency admitted for DKA c/b hyperkalemia and volume overload.    Neuro:  - Stable, no active issues    CV:  - Hemodynamically stable not req pressors  - BP elev after restarting home hydralazine, will restart home coreg now. If stable later today will restart home lisinopril  - Mild CE elev likely related to ESRD, no alarming EKG changes, will stop trending CE  - C/w DAPT, high-intensity statin. Restart BB and ACE-i as noted above  - S/p HD yest w/1.5L fluid removal, clinically euvolemic now. Cont to monitor strict I/O  - LLE consistent w/vascular claudication, good cap refill, no urgency, can f/u as outpt. LE swelling reportedly chronic, will check LE duplex    Pulm:  - Stable, no active issues    Renal:  - S/p HD yest w/1.5L vol removal, electrolytes normalized (no longer hyperkalemic or uremic)  - Monitor strict I/O, trend BMP  - Renal recs apprec. Will d/w them possibly switching sensipar to alternative phos-binder as EKG w/mildly prolonged QTc    GI:  - Abd pain, N/V likely 2/2 DKA given sx now resolved, less likely viral gastroenteritis  - Will start PO diet    ID:  - Afebrile, no leukocytosis, no SIRS. No overt infxn, bld cx NGTD, will check UA w/next urination (oliguric), monitor off abx    Heme:  - Stable, no active issues (chronic stable anemia likely related to AOCKD)    Endo:  -    DVT ppx: 62F w/PMHx HTN, HLD, T1DM (insulin pump), CAD/MI s/p PCI, HFrEF (EF 40-45%), PAD s/p b/l fem-pop bypass and stent for subclavian v stenosis, ESRD (HD 4x/week), TIA/CVA w/residual "memory" deficit, with frequent ICU admissions for DKA, ESRD, HTN emergency admitted for DKA c/b hyperkalemia and volume overload.    Neuro:  - Stable, no active issues    CV:  - Hemodynamically stable not req pressors  - BP elev after restarting home hydralazine, will restart home coreg now. If stable later today will restart home lisinopril  - Mild CE elev likely related to ESRD, no alarming EKG changes, will stop trending CE  - C/w DAPT, high-intensity statin. Restart BB and ACE-i as noted above  - S/p HD yest w/1.5L fluid removal, clinically euvolemic now. Cont to monitor strict I/O  - LLE consistent w/vascular claudication, good cap refill, no urgency, can f/u as outpt. LE swelling reportedly chronic, will check LE duplex    Pulm:  - Stable, no active issues    Renal:  - S/p HD yest w/1.5L vol removal, electrolytes normalized (no longer hyperkalemic or uremic)  - Monitor strict I/O, trend BMP  - Renal recs apprec. Will d/w them possibly switching sensipar to alternative phos-binder as EKG w/mildly prolonged QTc    GI:  - Abd pain, N/V likely 2/2 DKA given sx now resolved, less likely viral gastroenteritis  - Will start PO diet    ID:  - Afebrile, no leukocytosis, no SIRS. No overt infxn, bld cx NGTD, will check UA w/next urination (oliguric), monitor off abx    Heme:  - Stable, no active issues (chronic stable anemia likely related to AOCKD)    Endo:  - AG closed (34->14), FSBG 100s on insulin@1, D20@15. Given pt is a brittle diabetic with episodes of hypoglycemia, endo c/s for planned bridge to basal bolus vs restarting pump, final recs pending  - After d/w endo fellow, pt not using pump properly (not changing sites q3d), and having dietary indiscretions, likely etiology for DKA    Gyn:  - Pt had one episode of painless vaginal bleeding this AM, no prev episodes since menopause. No thrombocytopenia, coags WNL. Will c/s gyn    DVT ppx:  - HSQ8

## 2018-03-01 NOTE — PROGRESS NOTE ADULT - SUBJECTIVE AND OBJECTIVE BOX
CHIEF COMPLAINT:    Interval Events:    REVIEW OF SYSTEMS:  Constitutional: [ ] negative [ ] fevers [ ] chills [ ] weight loss [ ] weight gain  HEENT: [ ] negative [ ] dry eyes [ ] eye irritation [ ] postnasal drip [ ] nasal congestion  CV: [ ] negative  [ ] chest pain [ ] orthopnea [ ] palpitations [ ] murmur  Resp: [ ] negative [ ] cough [ ] shortness of breath [ ] dyspnea [ ] wheezing [ ] sputum [ ] hemoptysis  GI: [ ] negative [ ] nausea [ ] vomiting [ ] diarrhea [ ] constipation [ ] abd pain [ ] dysphagia   : [ ] negative [ ] dysuria [ ] nocturia [ ] hematuria [ ] increased urinary frequency  Musculoskeletal: [ ] negative [ ] back pain [ ] myalgias [ ] arthralgias [ ] fracture  Skin: [ ] negative [ ] rash [ ] itch  Neurological: [ ] negative [ ] headache [ ] dizziness [ ] syncope [ ] weakness [ ] numbness  Psychiatric: [ ] negative [ ] anxiety [ ] depression  Endocrine: [ ] negative [ ] diabetes [ ] thyroid problem  Hematologic/Lymphatic: [ ] negative [ ] anemia [ ] bleeding problem  Allergic/Immunologic: [ ] negative [ ] itchy eyes [ ] nasal discharge [ ] hives [ ] angioedema  [ ] All other systems negative  [ ] Unable to assess ROS because ________    OBJECTIVE:  ICU Vital Signs Last 24 Hrs  T(C): 36.8 (01 Mar 2018 04:00), Max: 36.8 (28 Feb 2018 17:22)  T(F): 98.2 (01 Mar 2018 04:00), Max: 98.3 (28 Feb 2018 21:27)  HR: 85 (01 Mar 2018 07:00) (46 - 89)  BP: 135/63 (01 Mar 2018 07:00) (88/48 - 184/81)  BP(mean): 90 (01 Mar 2018 07:00) (64 - 117)  ABP: --  ABP(mean): --  RR: 16 (01 Mar 2018 07:00) (15 - 25)  SpO2: 99% (01 Mar 2018 07:00) (89% - 100%)        02-28 @ 07:01  -  03-01 @ 07:00  --------------------------------------------------------  IN: 1221 mL / OUT: 2400 mL / NET: -1179 mL      CAPILLARY BLOOD GLUCOSE      POCT Blood Glucose.: 162 mg/dL (01 Mar 2018 06:52)      PHYSICAL EXAM:  General:   HEENT:   Lymph Nodes:  Neck:   Respiratory:   Cardiovascular:   Abdomen:   Extremities:   Skin:   Neurological:  Psychiatry:    LINES:    HOSPITAL MEDICATIONS:  aspirin enteric coated 81 milliGRAM(s) Oral daily  clopidogrel Tablet 75 milliGRAM(s) Oral at bedtime  heparin  Injectable 5000 Unit(s) SubCutaneous every 8 hours      hydrALAZINE 50 milliGRAM(s) Oral every 8 hours    atorvastatin 20 milliGRAM(s) Oral at bedtime  insulin Infusion 1 Unit(s)/Hr IV Continuous <Continuous>                    cinacalcet 60 milliGRAM(s) Oral daily  dextrose 20% 1000 milliLiter(s) 1000 milliLiter(s) IV Continuous <Continuous>      LABS:                        8.8    9.0   )-----------( 230      ( 28 Feb 2018 21:42 )             25.8     Hgb Trend: 8.8<--, 9.9<--  03-01    137  |  96  |  19  ----------------------------<  227<H>  3.6   |  27  |  2.89<H>    Ca    7.6<L>      01 Mar 2018 06:10  Phos  4.3     03-01  Mg     1.7     03-01    TPro  7.0  /  Alb  4.1  /  TBili  0.7  /  DBili  x   /  AST  46<H>  /  ALT  24  /  AlkPhos  147<H>  02-28    Creatinine Trend: 2.89<--, 2.60<--, 7.34<--, 7.17<--  PT/INR - ( 28 Feb 2018 21:52 )   PT: 11.7 sec;   INR: 1.07 ratio         PTT - ( 28 Feb 2018 17:46 )  PTT:29.5 sec      Venous Blood Gas:  02-28 @ 21:36  7.31/43/74/21/92  VBG Lactate: 4.2  Venous Blood Gas:  02-28 @ 17:46  7.20/37/43/14/61  VBG Lactate: 4.2      MICROBIOLOGY:     RADIOLOGY:  [ ] Reviewed and interpreted by me    EKG: CHIEF COMPLAINT:    Interval Events:    Pt seen and examined at bedside. Became more HTN o/n, received labetalol x1 post-HD, restarted on home hydralazine. Received HD yest, FSBG downtrended, req D20 gtt, AG now closed    REVIEW OF SYSTEMS:  Constitutional: [ ] negative [ ] fevers [ ] chills [ ] weight loss [ ] weight gain  HEENT: [ ] negative [ ] dry eyes [ ] eye irritation [ ] postnasal drip [ ] nasal congestion  CV: [ ] negative  [ ] chest pain [ ] orthopnea [ ] palpitations [ ] murmur  Resp: [ ] negative [ ] cough [ ] shortness of breath [ ] dyspnea [ ] wheezing [ ] sputum [ ] hemoptysis  GI: [ ] negative [ ] nausea [ ] vomiting [ ] diarrhea [ ] constipation [ ] abd pain [ ] dysphagia   : [ ] negative [ ] dysuria [ ] nocturia [ ] hematuria [ ] increased urinary frequency  Musculoskeletal: [ ] negative [ ] back pain [ ] myalgias [ ] arthralgias [ ] fracture  Skin: [ ] negative [ ] rash [ ] itch  Neurological: [ ] negative [ ] headache [ ] dizziness [ ] syncope [ ] weakness [ ] numbness  Psychiatric: [ ] negative [ ] anxiety [ ] depression  Endocrine: [ ] negative [ ] diabetes [ ] thyroid problem  Hematologic/Lymphatic: [ ] negative [ ] anemia [ ] bleeding problem  Allergic/Immunologic: [ ] negative [ ] itchy eyes [ ] nasal discharge [ ] hives [ ] angioedema  [ ] All other systems negative  [ ] Unable to assess ROS because ________    OBJECTIVE:  ICU Vital Signs Last 24 Hrs  T(C): 36.8 (01 Mar 2018 04:00), Max: 36.8 (28 Feb 2018 17:22)  T(F): 98.2 (01 Mar 2018 04:00), Max: 98.3 (28 Feb 2018 21:27)  HR: 85 (01 Mar 2018 07:00) (46 - 89)  BP: 135/63 (01 Mar 2018 07:00) (88/48 - 184/81)  BP(mean): 90 (01 Mar 2018 07:00) (64 - 117)  ABP: --  ABP(mean): --  RR: 16 (01 Mar 2018 07:00) (15 - 25)  SpO2: 99% (01 Mar 2018 07:00) (89% - 100%)        02-28 @ 07:01  -  03-01 @ 07:00  --------------------------------------------------------  IN: 1221 mL / OUT: 2400 mL / NET: -1179 mL      CAPILLARY BLOOD GLUCOSE      POCT Blood Glucose.: 162 mg/dL (01 Mar 2018 06:52)      PHYSICAL EXAM:  General:   HEENT:   Lymph Nodes:  Neck:   Respiratory:   Cardiovascular:   Abdomen:   Extremities:   Skin:   Neurological:  Psychiatry:    LINES:    HOSPITAL MEDICATIONS:  aspirin enteric coated 81 milliGRAM(s) Oral daily  clopidogrel Tablet 75 milliGRAM(s) Oral at bedtime  heparin  Injectable 5000 Unit(s) SubCutaneous every 8 hours      hydrALAZINE 50 milliGRAM(s) Oral every 8 hours    atorvastatin 20 milliGRAM(s) Oral at bedtime  insulin Infusion 1 Unit(s)/Hr IV Continuous <Continuous>                    cinacalcet 60 milliGRAM(s) Oral daily  dextrose 20% 1000 milliLiter(s) 1000 milliLiter(s) IV Continuous <Continuous>      LABS:                        8.8    9.0   )-----------( 230      ( 28 Feb 2018 21:42 )             25.8     Hgb Trend: 8.8<--, 9.9<--  03-01    137  |  96  |  19  ----------------------------<  227<H>  3.6   |  27  |  2.89<H>    Ca    7.6<L>      01 Mar 2018 06:10  Phos  4.3     03-01  Mg     1.7     03-01    TPro  7.0  /  Alb  4.1  /  TBili  0.7  /  DBili  x   /  AST  46<H>  /  ALT  24  /  AlkPhos  147<H>  02-28    Creatinine Trend: 2.89<--, 2.60<--, 7.34<--, 7.17<--  PT/INR - ( 28 Feb 2018 21:52 )   PT: 11.7 sec;   INR: 1.07 ratio         PTT - ( 28 Feb 2018 17:46 )  PTT:29.5 sec      Venous Blood Gas:  02-28 @ 21:36  7.31/43/74/21/92  VBG Lactate: 4.2  Venous Blood Gas:  02-28 @ 17:46  7.20/37/43/14/61  VBG Lactate: 4.2      MICROBIOLOGY:     RADIOLOGY:  [ ] Reviewed and interpreted by me    EKG: CHIEF COMPLAINT:    Interval Events:    Pt seen and examined at bedside. Became more HTN o/n, received labetalol x1 post-HD, restarted on home hydralazine. Received HD yest, FSBG downtrended, req D20 gtt, AG now closed. C/o RLE pain    REVIEW OF SYSTEMS:  Constitutional: [ ] negative [ ] fevers [ ] chills [ ] weight loss [ ] weight gain  HEENT: [ ] negative [ ] dry eyes [ ] eye irritation [ ] postnasal drip [ ] nasal congestion  CV: [ ] negative  [ ] chest pain [ ] orthopnea [ ] palpitations [ ] murmur  Resp: [ ] negative [ ] cough [ ] shortness of breath [ ] dyspnea [ ] wheezing [ ] sputum [ ] hemoptysis  GI: [ ] negative [ ] nausea [ ] vomiting [ ] diarrhea [ ] constipation [ ] abd pain [ ] dysphagia   : [ ] negative [ ] dysuria [ ] nocturia [ ] hematuria [ ] increased urinary frequency  Musculoskeletal: [ ] negative [ ] back pain [ ] myalgias [ ] arthralgias [ ] fracture  Skin: [ ] negative [ ] rash [ ] itch  Neurological: [ ] negative [ ] headache [ ] dizziness [ ] syncope [ ] weakness [ ] numbness  Psychiatric: [ ] negative [ ] anxiety [ ] depression  Endocrine: [ ] negative [ ] diabetes [ ] thyroid problem  Hematologic/Lymphatic: [ ] negative [ ] anemia [ ] bleeding problem  Allergic/Immunologic: [ ] negative [ ] itchy eyes [ ] nasal discharge [ ] hives [ ] angioedema  [ ] All other systems negative  [ ] Unable to assess ROS because ________    OBJECTIVE:  ICU Vital Signs Last 24 Hrs  T(C): 36.8 (01 Mar 2018 04:00), Max: 36.8 (28 Feb 2018 17:22)  T(F): 98.2 (01 Mar 2018 04:00), Max: 98.3 (28 Feb 2018 21:27)  HR: 85 (01 Mar 2018 07:00) (46 - 89)  BP: 135/63 (01 Mar 2018 07:00) (88/48 - 184/81)  BP(mean): 90 (01 Mar 2018 07:00) (64 - 117)  ABP: --  ABP(mean): --  RR: 16 (01 Mar 2018 07:00) (15 - 25)  SpO2: 99% (01 Mar 2018 07:00) (89% - 100%)        02-28 @ 07:01  -  03-01 @ 07:00  --------------------------------------------------------  IN: 1221 mL / OUT: 2400 mL / NET: -1179 mL      CAPILLARY BLOOD GLUCOSE      POCT Blood Glucose.: 162 mg/dL (01 Mar 2018 06:52)      PHYSICAL EXAM:  General:   HEENT:   Lymph Nodes:  Neck:   Respiratory:   Cardiovascular:   Abdomen:   Extremities:   Skin:   Neurological:  Psychiatry:    LINES:    HOSPITAL MEDICATIONS:  aspirin enteric coated 81 milliGRAM(s) Oral daily  clopidogrel Tablet 75 milliGRAM(s) Oral at bedtime  heparin  Injectable 5000 Unit(s) SubCutaneous every 8 hours      hydrALAZINE 50 milliGRAM(s) Oral every 8 hours    atorvastatin 20 milliGRAM(s) Oral at bedtime  insulin Infusion 1 Unit(s)/Hr IV Continuous <Continuous>                    cinacalcet 60 milliGRAM(s) Oral daily  dextrose 20% 1000 milliLiter(s) 1000 milliLiter(s) IV Continuous <Continuous>      LABS:                        8.8    9.0   )-----------( 230      ( 28 Feb 2018 21:42 )             25.8     Hgb Trend: 8.8<--, 9.9<--  03-01    137  |  96  |  19  ----------------------------<  227<H>  3.6   |  27  |  2.89<H>    Ca    7.6<L>      01 Mar 2018 06:10  Phos  4.3     03-01  Mg     1.7     03-01    TPro  7.0  /  Alb  4.1  /  TBili  0.7  /  DBili  x   /  AST  46<H>  /  ALT  24  /  AlkPhos  147<H>  02-28    Creatinine Trend: 2.89<--, 2.60<--, 7.34<--, 7.17<--  PT/INR - ( 28 Feb 2018 21:52 )   PT: 11.7 sec;   INR: 1.07 ratio         PTT - ( 28 Feb 2018 17:46 )  PTT:29.5 sec      Venous Blood Gas:  02-28 @ 21:36  7.31/43/74/21/92  VBG Lactate: 4.2  Venous Blood Gas:  02-28 @ 17:46  7.20/37/43/14/61  VBG Lactate: 4.2      MICROBIOLOGY:     RADIOLOGY:  [ ] Reviewed and interpreted by me    EKG: CHIEF COMPLAINT:    Interval Events:    Pt seen and examined at bedside. Became more HTN o/n, received labetalol x1 post-HD, restarted on home hydralazine. Received HD yest, FSBG downtrended, req D20 gtt, AG now closed. C/o RLE pain, chronic, improved w/hanging leg off bed. Had episode of vaginal bleeding this AM. Denies CP/palp, SOB, abd pain, N/V/D, dysuria, cough, F/C.    REVIEW OF SYSTEMS:  [x] All other systems negative except as noted above  [ ] Unable to assess ROS because ________    OBJECTIVE:  ICU Vital Signs Last 24 Hrs  T(C): 36.8 (01 Mar 2018 04:00), Max: 36.8 (28 Feb 2018 17:22)  T(F): 98.2 (01 Mar 2018 04:00), Max: 98.3 (28 Feb 2018 21:27)  HR: 85 (01 Mar 2018 07:00) (46 - 89)  BP: 135/63 (01 Mar 2018 07:00) (88/48 - 184/81)  BP(mean): 90 (01 Mar 2018 07:00) (64 - 117)  ABP: --  ABP(mean): --  RR: 16 (01 Mar 2018 07:00) (15 - 25)  SpO2: 99% (01 Mar 2018 07:00) (89% - 100%)        02-28 @ 07:01  -  03-01 @ 07:00  --------------------------------------------------------  IN: 1221 mL / OUT: 2400 mL / NET: -1179 mL      CAPILLARY BLOOD GLUCOSE      POCT Blood Glucose.: 162 mg/dL (01 Mar 2018 06:52)      PHYSICAL EXAM:  General: NAD  HEENT: PERRL, EOMI, MMM  Neck: No JVD, neg HJR  Respiratory: CTA b/l, no C/W/R  Cardiovascular: RRR, S1+S2, no M/R/G  Abdomen: +BS, S, ND, NTTP  Extremities: LLE w/1+ nonpitting edema, no erythema, TTP  Skin: No rash  Neurological: AOx4, no focal deficit  Psychiatry: Approp mood/affect    LINES:    HOSPITAL MEDICATIONS:  aspirin enteric coated 81 milliGRAM(s) Oral daily  clopidogrel Tablet 75 milliGRAM(s) Oral at bedtime  heparin  Injectable 5000 Unit(s) SubCutaneous every 8 hours      hydrALAZINE 50 milliGRAM(s) Oral every 8 hours    atorvastatin 20 milliGRAM(s) Oral at bedtime  insulin Infusion 1 Unit(s)/Hr IV Continuous <Continuous>                    cinacalcet 60 milliGRAM(s) Oral daily  dextrose 20% 1000 milliLiter(s) 1000 milliLiter(s) IV Continuous <Continuous>      LABS:                        8.8    9.0   )-----------( 230      ( 28 Feb 2018 21:42 )             25.8     Hgb Trend: 8.8<--, 9.9<--  03-01    137  |  96  |  19  ----------------------------<  227<H>  3.6   |  27  |  2.89<H>    Ca    7.6<L>      01 Mar 2018 06:10  Phos  4.3     03-01  Mg     1.7     03-01    TPro  7.0  /  Alb  4.1  /  TBili  0.7  /  DBili  x   /  AST  46<H>  /  ALT  24  /  AlkPhos  147<H>  02-28    Creatinine Trend: 2.89<--, 2.60<--, 7.34<--, 7.17<--  PT/INR - ( 28 Feb 2018 21:52 )   PT: 11.7 sec;   INR: 1.07 ratio         PTT - ( 28 Feb 2018 17:46 )  PTT:29.5 sec      Venous Blood Gas:  02-28 @ 21:36  7.31/43/74/21/92  VBG Lactate: 4.2  Venous Blood Gas:  02-28 @ 17:46  7.20/37/43/14/61  VBG Lactate: 4.2      MICROBIOLOGY:     RADIOLOGY:  [ ] Reviewed and interpreted by me    EKG:

## 2018-03-01 NOTE — PROGRESS NOTE ADULT - SUBJECTIVE AND OBJECTIVE BOX
[facility]   [phone]  RENAL FOLLOW UP NOTE  --------------------------------------------------------------------------------  Chief Complaint:    24 hour events/subjective:    seen earlier and case d/w icu team     PAST HISTORY  --------------------------------------------------------------------------------  No significant changes to PMH, PSH, FHx, SHx, unless otherwise noted    ALLERGIES & MEDICATIONS  --------------------------------------------------------------------------------  Allergies    No Known Allergies    Intolerances      Standing Inpatient Medications  aspirin enteric coated 81 milliGRAM(s) Oral daily  atorvastatin 20 milliGRAM(s) Oral at bedtime  cinacalcet 60 milliGRAM(s) Oral daily  clopidogrel Tablet 75 milliGRAM(s) Oral at bedtime  dextrose 20% 1000 milliLiter(s) 1000 milliLiter(s) IV Continuous <Continuous>  heparin  Injectable 5000 Unit(s) SubCutaneous every 8 hours  hydrALAZINE 50 milliGRAM(s) Oral every 8 hours  insulin Infusion 1 Unit(s)/Hr IV Continuous <Continuous>  metoprolol     tartrate 25 milliGRAM(s) Oral two times a day    PRN Inpatient Medications      REVIEW OF SYSTEMS  --------------------------------------------------------------------------------    Gen: denies fevers/chills,  CVS: denies chest pain/palpitations  Resp: denies SOB/Cough  GI: Denies N/V/Abd pain  : Denies dysuria/oliguria    All other systems were reviewed and are negative, except as noted.    VITALS/PHYSICAL EXAM  --------------------------------------------------------------------------------  T(C): 36.9 (03-01-18 @ 12:00), Max: 37 (03-01-18 @ 08:00)  HR: 79 (03-01-18 @ 12:00) (46 - 89)  BP: 139/63 (03-01-18 @ 12:00) (88/48 - 184/81)  RR: 20 (03-01-18 @ 12:00) (14 - 25)  SpO2: 99% (03-01-18 @ 12:00) (89% - 100%)  Wt(kg): --  Height (cm): 162.56 (02-28-18 @ 21:27)  Weight (kg): 54.1 (02-28-18 @ 21:27)  BMI (kg/m2): 20.5 (02-28-18 @ 21:27)  BSA (m2): 1.57 (02-28-18 @ 21:27)      02-28-18 @ 07:01  -  03-01-18 @ 07:00  --------------------------------------------------------  IN: 1220.5 mL / OUT: 2400 mL / NET: -1179.5 mL    03-01-18 @ 07:01  -  03-01-18 @ 16:15  --------------------------------------------------------  IN: 65 mL / OUT: 10 mL / NET: 55 mL        Physical Exam:  	Gen: alert oriented   	Pulm: Decreased breath sounds b/l bases. no rales or ronchi or wheezing  	CV: RRR, S1/S2. no rub  	Back: No CVA tenderness; no sacral edema  	Abd: +BS, soft, nontender/nondistended  	: No suprapubic tenderness.               Extremity: No cyanosis, no edema no clubbing  	      LABS/STUDIES  --------------------------------------------------------------------------------              8.8    9.0   >-----------<  230      [02-28-18 @ 21:42]              25.8     137  |  96  |  19  ----------------------------<  227      [03-01-18 @ 06:10]  3.6   |  27  |  2.89        Ca     7.6     [03-01-18 @ 06:10]      Mg     1.7     [03-01-18 @ 06:10]      Phos  4.3     [03-01-18 @ 06:10]    TPro  7.0  /  Alb  4.1  /  TBili  0.7  /  DBili  x   /  AST  46  /  ALT  24  /  AlkPhos  147  [02-28-18 @ 17:46]    PT/INR: PT 11.7 , INR 1.07       [02-28-18 @ 21:52]  PTT: 29.5       [02-28-18 @ 17:46]    Troponin 0.60      [03-01-18 @ 06:10]        [03-01-18 @ 06:10]    Creatinine Trend:  SCr 2.89 [03-01 @ 06:10]  SCr 2.60 [03-01 @ 02:44]  SCr 7.34 [02-28 @ 21:42]  SCr 7.17 [02-28 @ 17:46]                  HbA1c 7.1      [03-01-18 @ 01:59]  TSH 1.07      [12-19-17 @ 06:12]

## 2018-03-02 ENCOUNTER — RESULT REVIEW (OUTPATIENT)
Age: 63
End: 2018-03-02

## 2018-03-02 LAB
APTT BLD: 27.2 SEC — LOW (ref 27.5–37.4)
GLUCOSE BLDC GLUCOMTR-MCNC: 101 MG/DL — HIGH (ref 70–99)
GLUCOSE BLDC GLUCOMTR-MCNC: 104 MG/DL — HIGH (ref 70–99)
GLUCOSE BLDC GLUCOMTR-MCNC: 112 MG/DL — HIGH (ref 70–99)
GLUCOSE BLDC GLUCOMTR-MCNC: 119 MG/DL — HIGH (ref 70–99)
GLUCOSE BLDC GLUCOMTR-MCNC: 178 MG/DL — HIGH (ref 70–99)
GLUCOSE BLDC GLUCOMTR-MCNC: 181 MG/DL — HIGH (ref 70–99)
GLUCOSE BLDC GLUCOMTR-MCNC: 254 MG/DL — HIGH (ref 70–99)
GLUCOSE BLDC GLUCOMTR-MCNC: 300 MG/DL — HIGH (ref 70–99)
GLUCOSE BLDC GLUCOMTR-MCNC: 89 MG/DL — SIGNIFICANT CHANGE UP (ref 70–99)
GLUCOSE BLDC GLUCOMTR-MCNC: 92 MG/DL — SIGNIFICANT CHANGE UP (ref 70–99)
INR BLD: 1.09 RATIO — SIGNIFICANT CHANGE UP (ref 0.88–1.16)
PROTHROM AB SERPL-ACNC: 11.8 SEC — SIGNIFICANT CHANGE UP (ref 9.8–12.7)

## 2018-03-02 PROCEDURE — 99232 SBSQ HOSP IP/OBS MODERATE 35: CPT | Mod: GC

## 2018-03-02 PROCEDURE — 99233 SBSQ HOSP IP/OBS HIGH 50: CPT | Mod: GC

## 2018-03-02 PROCEDURE — 93010 ELECTROCARDIOGRAM REPORT: CPT

## 2018-03-02 PROCEDURE — 76830 TRANSVAGINAL US NON-OB: CPT | Mod: 26

## 2018-03-02 PROCEDURE — 88141 CYTOPATH C/V INTERPRET: CPT

## 2018-03-02 RX ORDER — GLUCAGON INJECTION, SOLUTION 0.5 MG/.1ML
1 INJECTION, SOLUTION SUBCUTANEOUS ONCE
Qty: 0 | Refills: 0 | Status: DISCONTINUED | OUTPATIENT
Start: 2018-03-02 | End: 2018-03-02

## 2018-03-02 RX ORDER — INSULIN GLARGINE 100 [IU]/ML
8 INJECTION, SOLUTION SUBCUTANEOUS AT BEDTIME
Qty: 0 | Refills: 0 | Status: DISCONTINUED | OUTPATIENT
Start: 2018-03-02 | End: 2018-03-02

## 2018-03-02 RX ORDER — DEXTROSE 50 % IN WATER 50 %
25 SYRINGE (ML) INTRAVENOUS ONCE
Qty: 0 | Refills: 0 | Status: DISCONTINUED | OUTPATIENT
Start: 2018-03-02 | End: 2018-03-02

## 2018-03-02 RX ORDER — SODIUM CHLORIDE 9 MG/ML
1000 INJECTION, SOLUTION INTRAVENOUS
Qty: 0 | Refills: 0 | Status: DISCONTINUED | OUTPATIENT
Start: 2018-03-02 | End: 2018-03-02

## 2018-03-02 RX ORDER — DEXTROSE 50 % IN WATER 50 %
1 SYRINGE (ML) INTRAVENOUS ONCE
Qty: 0 | Refills: 0 | Status: DISCONTINUED | OUTPATIENT
Start: 2018-03-02 | End: 2018-03-02

## 2018-03-02 RX ORDER — DEXTROSE 50 % IN WATER 50 %
1 SYRINGE (ML) INTRAVENOUS ONCE
Qty: 0 | Refills: 0 | Status: DISCONTINUED | OUTPATIENT
Start: 2018-03-02 | End: 2018-03-04

## 2018-03-02 RX ORDER — DEXTROSE 50 % IN WATER 50 %
25 SYRINGE (ML) INTRAVENOUS ONCE
Qty: 0 | Refills: 0 | Status: DISCONTINUED | OUTPATIENT
Start: 2018-03-02 | End: 2018-03-04

## 2018-03-02 RX ORDER — INSULIN LISPRO 100/ML
VIAL (ML) SUBCUTANEOUS AT BEDTIME
Qty: 0 | Refills: 0 | Status: DISCONTINUED | OUTPATIENT
Start: 2018-03-02 | End: 2018-03-02

## 2018-03-02 RX ORDER — INSULIN LISPRO 100/ML
VIAL (ML) SUBCUTANEOUS
Qty: 0 | Refills: 0 | Status: DISCONTINUED | OUTPATIENT
Start: 2018-03-02 | End: 2018-03-02

## 2018-03-02 RX ORDER — INSULIN LISPRO 100/ML
2 VIAL (ML) SUBCUTANEOUS
Qty: 0 | Refills: 0 | Status: DISCONTINUED | OUTPATIENT
Start: 2018-03-02 | End: 2018-03-02

## 2018-03-02 RX ORDER — SODIUM CHLORIDE 9 MG/ML
1000 INJECTION, SOLUTION INTRAVENOUS
Qty: 0 | Refills: 0 | Status: DISCONTINUED | OUTPATIENT
Start: 2018-03-02 | End: 2018-03-04

## 2018-03-02 RX ORDER — INSULIN LISPRO 100/ML
3 VIAL (ML) SUBCUTANEOUS
Qty: 0 | Refills: 0 | Status: DISCONTINUED | OUTPATIENT
Start: 2018-03-02 | End: 2018-03-03

## 2018-03-02 RX ORDER — INSULIN GLARGINE 100 [IU]/ML
8 INJECTION, SOLUTION SUBCUTANEOUS AT BEDTIME
Qty: 0 | Refills: 0 | Status: DISCONTINUED | OUTPATIENT
Start: 2018-03-02 | End: 2018-03-03

## 2018-03-02 RX ORDER — DEXTROSE 50 % IN WATER 50 %
12.5 SYRINGE (ML) INTRAVENOUS ONCE
Qty: 0 | Refills: 0 | Status: DISCONTINUED | OUTPATIENT
Start: 2018-03-02 | End: 2018-03-02

## 2018-03-02 RX ORDER — INSULIN LISPRO 100/ML
VIAL (ML) SUBCUTANEOUS AT BEDTIME
Qty: 0 | Refills: 0 | Status: DISCONTINUED | OUTPATIENT
Start: 2018-03-02 | End: 2018-03-04

## 2018-03-02 RX ORDER — DEXTROSE 50 % IN WATER 50 %
12.5 SYRINGE (ML) INTRAVENOUS ONCE
Qty: 0 | Refills: 0 | Status: DISCONTINUED | OUTPATIENT
Start: 2018-03-02 | End: 2018-03-04

## 2018-03-02 RX ORDER — GLUCAGON INJECTION, SOLUTION 0.5 MG/.1ML
1 INJECTION, SOLUTION SUBCUTANEOUS ONCE
Qty: 0 | Refills: 0 | Status: DISCONTINUED | OUTPATIENT
Start: 2018-03-02 | End: 2018-03-04

## 2018-03-02 RX ORDER — INFLUENZA VIRUS VACCINE 15; 15; 15; 15 UG/.5ML; UG/.5ML; UG/.5ML; UG/.5ML
0.5 SUSPENSION INTRAMUSCULAR ONCE
Qty: 0 | Refills: 0 | Status: DISCONTINUED | OUTPATIENT
Start: 2018-03-02 | End: 2018-03-04

## 2018-03-02 RX ORDER — INSULIN LISPRO 100/ML
VIAL (ML) SUBCUTANEOUS
Qty: 0 | Refills: 0 | Status: DISCONTINUED | OUTPATIENT
Start: 2018-03-02 | End: 2018-03-04

## 2018-03-02 RX ORDER — OXYCODONE AND ACETAMINOPHEN 5; 325 MG/1; MG/1
1 TABLET ORAL ONCE
Qty: 0 | Refills: 0 | Status: DISCONTINUED | OUTPATIENT
Start: 2018-03-02 | End: 2018-03-02

## 2018-03-02 RX ADMIN — Medication 3 UNIT(S): at 17:49

## 2018-03-02 RX ADMIN — Medication 50 MILLIGRAM(S): at 17:46

## 2018-03-02 RX ADMIN — Medication 2 UNIT(S): at 13:01

## 2018-03-02 RX ADMIN — Medication 81 MILLIGRAM(S): at 13:21

## 2018-03-02 RX ADMIN — Medication 25 MILLIGRAM(S): at 06:17

## 2018-03-02 RX ADMIN — Medication 25 MILLIGRAM(S): at 17:51

## 2018-03-02 RX ADMIN — Medication 3: at 13:00

## 2018-03-02 RX ADMIN — OXYCODONE AND ACETAMINOPHEN 1 TABLET(S): 5; 325 TABLET ORAL at 23:28

## 2018-03-02 RX ADMIN — Medication 50 MILLIGRAM(S): at 08:29

## 2018-03-02 RX ADMIN — Medication 1: at 17:47

## 2018-03-02 NOTE — CONSULT NOTE ADULT - CONSULT REASON
concern for PMB
help in management/ patient well known to me
hyperkalemia
DKA in patient with DM1 on insulin pump

## 2018-03-02 NOTE — PROGRESS NOTE ADULT - SUBJECTIVE AND OBJECTIVE BOX
CHIEF COMPLAINT:    Interval Events:    Pt seen and examined at bedside. AG closed yesterday, initially planned to reinstate pump by endo, however concern that pt would not utilize approp (did not recognize pump, did not realize insulin was in pump, put pump in same site despite being told not to by endo), switched to basal/bolus. Transitioned over w/lantus 10, written for humalog 3 TIDAC. Mildly hypoglycemic to 89 at 2AM, improved w/juice to 112, down to 92 @6AM. Pt asx. Still c/o chronic LLE pain.    REVIEW OF SYSTEMS:  Constitutional: [ ] negative [ ] fevers [ ] chills [ ] weight loss [ ] weight gain  HEENT: [ ] negative [ ] dry eyes [ ] eye irritation [ ] postnasal drip [ ] nasal congestion  CV: [ ] negative  [ ] chest pain [ ] orthopnea [ ] palpitations [ ] murmur  Resp: [ ] negative [ ] cough [ ] shortness of breath [ ] dyspnea [ ] wheezing [ ] sputum [ ] hemoptysis  GI: [ ] negative [ ] nausea [ ] vomiting [ ] diarrhea [ ] constipation [ ] abd pain [ ] dysphagia   : [ ] negative [ ] dysuria [ ] nocturia [ ] hematuria [ ] increased urinary frequency  Musculoskeletal: [ ] negative [ ] back pain [ ] myalgias [ ] arthralgias [ ] fracture  Skin: [ ] negative [ ] rash [ ] itch  Neurological: [ ] negative [ ] headache [ ] dizziness [ ] syncope [ ] weakness [ ] numbness  Psychiatric: [ ] negative [ ] anxiety [ ] depression  Endocrine: [ ] negative [ ] diabetes [ ] thyroid problem  Hematologic/Lymphatic: [ ] negative [ ] anemia [ ] bleeding problem  Allergic/Immunologic: [ ] negative [ ] itchy eyes [ ] nasal discharge [ ] hives [ ] angioedema  [x] All other systems negative except as noted above  [ ] Unable to assess ROS because ________    OBJECTIVE:  ICU Vital Signs Last 24 Hrs  T(C): 36.7 (02 Mar 2018 00:00), Max: 37.1 (01 Mar 2018 16:00)  T(F): 98.1 (02 Mar 2018 00:00), Max: 98.8 (01 Mar 2018 16:00)  HR: 72 (02 Mar 2018 07:00) (68 - 85)  BP: 142/65 (02 Mar 2018 07:00) (100/54 - 156/66)  BP(mean): 94 (02 Mar 2018 07:00) (73 - 100)  ABP: --  ABP(mean): --  RR: 15 (02 Mar 2018 07:00) (14 - 28)  SpO2: 97% (02 Mar 2018 07:00) (94% - 100%)        03-01 @ 07:01  -  03-02 @ 07:00  --------------------------------------------------------  IN: 269 mL / OUT: 30 mL / NET: 239 mL      CAPILLARY BLOOD GLUCOSE      POCT Blood Glucose.: 92 mg/dL (02 Mar 2018 06:17)      PHYSICAL EXAM:  General: NAD  HEENT: PERRL, EOMI, MMM  Neck: No JVD, neg HJR  Respiratory: CTA b/l, no C/W/R  Cardiovascular: RRR, S1+S2, no M/R/G  Abdomen: +BS, S, ND, NTTP  Extremities: LLE w/1+ nonpitting edema, no erythema, TTP  Skin: No rash  Neurological: AOx4, no focal deficit  Psychiatry: Approp mood/affect    LINES:    HOSPITAL MEDICATIONS:  aspirin enteric coated 81 milliGRAM(s) Oral daily  clopidogrel Tablet 75 milliGRAM(s) Oral at bedtime  heparin  Injectable 5000 Unit(s) SubCutaneous every 8 hours      hydrALAZINE 50 milliGRAM(s) Oral every 8 hours  metoprolol     tartrate 25 milliGRAM(s) Oral two times a day    atorvastatin 20 milliGRAM(s) Oral at bedtime  dextrose 50% Injectable 12.5 Gram(s) IV Push once  dextrose 50% Injectable 25 Gram(s) IV Push once  dextrose 50% Injectable 25 Gram(s) IV Push once  dextrose Gel 1 Dose(s) Oral once PRN  glucagon  Injectable 1 milliGRAM(s) IntraMuscular once PRN  insulin glargine Injectable (LANTUS) 10 Unit(s) SubCutaneous at bedtime  insulin Infusion 1 Unit(s)/Hr IV Continuous <Continuous>  insulin lispro (HumaLOG) corrective regimen sliding scale   SubCutaneous three times a day before meals  insulin lispro (HumaLOG) corrective regimen sliding scale   SubCutaneous at bedtime  insulin lispro Injectable (HumaLOG) 3 Unit(s) SubCutaneous three times a day before meals              dextrose 5%. 1000 milliLiter(s) IV Continuous <Continuous>        cinacalcet 60 milliGRAM(s) Oral daily  dextrose 20% 1000 milliLiter(s) 1000 milliLiter(s) IV Continuous <Continuous>      LABS:                        9.6    8.6   )-----------( 273      ( 01 Mar 2018 23:15 )             29.0     Hgb Trend: 9.6<--, 8.8<--, 9.9<--  03-01    134<L>  |  90<L>  |  31<H>  ----------------------------<  125<H>  4.7   |  27  |  4.79<H>    Ca    7.8<L>      01 Mar 2018 23:15  Phos  5.4     03-01  Mg     2.0     03-01    TPro  7.0  /  Alb  4.1  /  TBili  0.7  /  DBili  x   /  AST  46<H>  /  ALT  24  /  AlkPhos  147<H>  02-28    Creatinine Trend: 4.79<--, 4.35<--, 2.89<--, 2.60<--, 7.34<--, 7.17<--  PT/INR - ( 01 Mar 2018 23:15 )   PT: 11.8 sec;   INR: 1.09 ratio         PTT - ( 01 Mar 2018 23:15 )  PTT:27.2 sec      Venous Blood Gas:  02-28 @ 21:36  7.31/43/74/21/92  VBG Lactate: 4.2  Venous Blood Gas:  02-28 @ 17:46  7.20/37/43/14/61  VBG Lactate: 4.2      MICROBIOLOGY:     RADIOLOGY:  [ ] Reviewed and interpreted by me    EKG:

## 2018-03-02 NOTE — PROGRESS NOTE ADULT - SUBJECTIVE AND OBJECTIVE BOX
Patient is a 62y old  Female who presents with a chief complaint of nausea/elevated blood glucose (28 Feb 2018 19:08)      SUBJECTIVE / OVERNIGHT EVENTS: Comfortable without new complaints.   Review of Systems  chest pain no  palpitations no  sob no  nausea no  headache no    MEDICATIONS  (STANDING):  aspirin enteric coated 81 milliGRAM(s) Oral daily  atorvastatin 20 milliGRAM(s) Oral at bedtime  cinacalcet 60 milliGRAM(s) Oral daily  clopidogrel Tablet 75 milliGRAM(s) Oral at bedtime  dextrose 5%. 1000 milliLiter(s) (50 mL/Hr) IV Continuous <Continuous>  dextrose 50% Injectable 12.5 Gram(s) IV Push once  dextrose 50% Injectable 25 Gram(s) IV Push once  dextrose 50% Injectable 25 Gram(s) IV Push once  heparin  Injectable 5000 Unit(s) SubCutaneous every 8 hours  hydrALAZINE 50 milliGRAM(s) Oral every 8 hours  influenza   Vaccine 0.5 milliLiter(s) IntraMuscular once  insulin glargine Injectable (LANTUS) 8 Unit(s) SubCutaneous at bedtime  insulin Infusion 1 Unit(s)/Hr (1 mL/Hr) IV Continuous <Continuous>  insulin lispro (HumaLOG) corrective regimen sliding scale   SubCutaneous three times a day before meals  insulin lispro (HumaLOG) corrective regimen sliding scale   SubCutaneous at bedtime  insulin lispro Injectable (HumaLOG) 3 Unit(s) SubCutaneous three times a day before meals  metoprolol     tartrate 25 milliGRAM(s) Oral two times a day    MEDICATIONS  (PRN):  dextrose Gel 1 Dose(s) Oral once PRN Blood Glucose LESS THAN 70 milliGRAM(s)/deciliter  glucagon  Injectable 1 milliGRAM(s) IntraMuscular once PRN Glucose LESS THAN 70 milligrams/deciliter          PHYSICAL EXAM:  GENERAL: NAD, well-developed  HEAD:  Atraumatic, Normocephalic  EYES: EOMI, PERRLA, conjunctiva and sclera clear  NECK: Supple, No JVD  CHEST/LUNG: Clear to auscultation bilaterally; No wheeze  HEART: Regular rate and rhythm; No murmurs, rubs, or gallops  ABDOMEN: Soft, Nontender, Nondistended; Bowel sounds present  EXTREMITIES:  2+ Peripheral Pulses, No clubbing, cyanosis, or edema  PSYCH: AAOx3  NEUROLOGY: non-focal  SKIN: No rashes or lesions    LABS:                        9.6    8.6   )-----------( 273      ( 01 Mar 2018 23:15 )             29.0     03-01    134<L>  |  90<L>  |  31<H>  ----------------------------<  125<H>  4.7   |  27  |  4.79<H>    Ca    7.8<L>      01 Mar 2018 23:15  Phos  5.4     03-01  Mg     2.0     03-01      PT/INR - ( 01 Mar 2018 23:15 )   PT: 11.8 sec;   INR: 1.09 ratio         PTT - ( 01 Mar 2018 23:15 )  PTT:27.2 sec  CARDIAC MARKERS ( 01 Mar 2018 06:10 )  x     / 0.60 ng/mL / 169 U/L / x     / 10.5 ng/mL  CARDIAC MARKERS ( 01 Mar 2018 02:44 )  x     / 0.51 ng/mL / 216 U/L / x     / 11.3 ng/mL  CARDIAC MARKERS ( 28 Feb 2018 21:42 )  x     / 0.22 ng/mL / 155 U/L / x     / 7.6 ng/mL          RADIOLOGY & ADDITIONAL TESTS:    Imaging Personally Reviewed:    Consultant(s) Notes Reviewed:      Care Discussed with Consultants/Other Providers:

## 2018-03-02 NOTE — PROGRESS NOTE ADULT - ASSESSMENT
61 y/o F PMH uncontrolled DM1, HTN, HLD, CAD s/p stents, ESRD on HD, presents with DKA in setting of incorrect use of insulin pump

## 2018-03-02 NOTE — PROGRESS NOTE ADULT - ATTENDING COMMENTS
Patient seen and evaluated with resident. Agree with above history, physical, and plan of care except as stated below.     62F PMH HTN, HLD, DM Type 1 (on insulin pump) with frequent admissions for DKA, CAD s/p PCI, ischemic cardiomyopathy, PAD s/p bilateral fem-pop bypass, ESRD on dialysis, and h/o TIA/CVA with residual memory loss who presents with recurrent DKA with concurrent hyperkalemia and volume overload requiring urgent dialysis. DKA likely due to not changing site of insulin pump.    - Off insulin gtt, s/p lantus 10 last night but AM FSG 89, decrease to 8 units qhs  - Pre-meal lispro 2 units tid ac, continue HISS, f/up Endo  - Insulin pump taken off, not in place  - Hold duloxetine given ESRD  - HD stable, continue ASA, plavix, statin, BB, hold ACEI  - Stabe on RA, goal SpO2>95%  - Diabetic diet as tolerated  - F/up renal re: dialysis, hold sensipar given prolonged QT  - Mild vaginal bleeding, f/up gyn, H/H stable  - Observe off abx  - DVT ppx  - Discussed with patient at bedside  - Stable for floors
Patient seen and evaluated with resident. Agree with above history, physical, and plan of care except as stated below.     62F PMH HTN, HLD, DM Type 1 (on insulin pump) with frequent admissions for DKA, CAD s/p PCI, ischemic cardiomyopathy, PAD s/p bilateral fem-pop bypass, ESRD on dialysis, and h/o TIA/CVA with residual memory loss who presents with recurrent DKA with concurrent hyperkalemia and volume overload requiring urgent dialysis.    - Keep insulin gtt until evaluated by endocrine, AG is closed, d/c D20 if tolerates feeds  - Insulin pump taken off, not in place  - Hold duloxetine given ESRD  - HD stable, restart ASA, plavix, statin, BB, ACEI  - Stabe on 2L NC, goal SpO2>95%  - Start diabetic diet, AG is closed  - F/up renal re: dialysis, hold sensipar given prolonged QT  - Observe off abx  - DVT ppx  - Discussed with patient at bedside  CC time spent: 35 min
Agree with assessment and plan as above by Dr. Kaur. Reviewed all pertinent labs, glucose values, and imaging studies. Modifications made as indicated above.     Nik Lorenzo D.O  [phone]

## 2018-03-02 NOTE — DIETITIAN INITIAL EVALUATION ADULT. - ENERGY NEEDS
Ht: 64 inches Wt: 119.2 pounds BMI: 20.5 kg/m2 IBW: 120 (+/-10%) 99.3 %IBW  Pertinent information: as per chart, pt with T1DM presents with DKA likely due to incorrent use of insulin pump - now resolved, hyperkalemia, vaginal bleeding, abdominal pain, ESRD last HD (03/01).   Edema: +2 left leg. Skin: no pressure injuries as per documentation. Ht: 64 inches Wt: 119.2 pounds BMI: 20.5 kg/m2 IBW: 120 (+/-10%) 99.3 %IBW  Pertinent information: as per chart, pt with T1DM presents with DKA likely due to incorrect use of insulin pump - now resolved, hyperkalemia, vaginal bleeding, abdominal pain, ESRD last HD (03/01).   Edema: +2 left leg. Skin: no pressure injuries as per documentation.

## 2018-03-02 NOTE — CHART NOTE - NSCHARTNOTEFT_GEN_A_CORE
MICU Transfer Note    Transfer from: MICU    Transfer to: (x) Medicine    (  ) Telemetry     (   ) RCU        (    ) Palliative         (   ) Stroke Unit          (   ) __________________    Accepting Physician: Dr. Pierce Viera  Signout given to:     MICU COURSE:          ASSESSMENT & PLAN:     62F w/PMHx HTN, HLD, T1DM (insulin pump), CAD/MI s/p PCI, HFrEF (EF 40-45%), PAD s/p b/l fem-pop bypass and stent for subclavian v stenosis, ESRD (HD 4x/week), TIA/CVA w/residual "memory" deficit, with frequent ICU admissions for DKA, ESRD, HTN emergency admitted for DKA c/b hyperkalemia and volume overload.    Neuro:  - Stable, no active issues    CV:  - Hemodynamically stable not req pressors  - BP stable after restarting home hydralazine and coreg, can likely restart home lisinopril later today  - Mild CE elev likely related to ESRD, no alarming EKG changes, no longer trending CE  - C/w DAPT, high-intensity statin. C/w BB and restart ACE-i as noted above  - S/p HD 2/28 w/1.5L fluid removal, clinically euvolemic now. F/u renal, plan for HD today. Cont to monitor strict I/O  - LE swelling chronic, LE duplex w/o DVT, only notable for occlusion of synthetic graft (vein graft patent). LLE pain consistent w/vascular claudication, good cap refill, no urgency, can f/u as outpt.     Pulm:  - Stable, no active issues    Renal:  - S/p HD 2/28 w/1.5L vol removal, electrolytes improved, no hyperkalemia, mild uremia, no vol overload. F/u renal, HD today  - Monitor strict I/O, trend BMP  - Renal recs apprec. Will d/w them possibly switching sensipar to alternative phos-binder as prev EKG w/mildly prolonged QTc, will repeat today. Check PTH    GI:  - Abd pain, N/V likely 2/2 DKA given sx now resolved, less likely viral gastroenteritis  - Nisha PO diet but poor intake as she doesn't like the food, f/u dietary recs    ID:  - Afebrile, no leukocytosis, no SIRS. No overt infxn, bld cx NGTD, will check UA, Ucx w/next urination (oliguric), monitor off abx    Heme:  - Stable, no active issues (chronic stable anemia likely related to AOCKD)    Endo:  - After d/w endo fellow, pt not using pump properly (not changing sites q3d), and having dietary indiscretions, likely etiology for DKA  - AG closed yest, dosed 10u lantus at 7PM, insulin gtt and D20 gtt d/c at 9PM. AG has remained closed however mildly hypoglycemic to 89 @2AM. Pt asx. AM premeal held, will dec lantus to 8qhs, humalog 2 TIDAC, c/w JADEN  - F/u further endo recs    Gyn:  - Pt had an episode of painless vaginal bleeding again o/n. No thrombocytopenia, coags WNL. CBC stable.  - Gyn c/s, TVUS pending, will check UA, UCx. Can f/u as outpt at Gyn Office [address] [facility], [address].     DVT ppx:  - HSQ8        FOR FOLLOW UP: MICU Transfer Note    Transfer from: MICU    Transfer to: (x) Medicine    (  ) Telemetry     (   ) RCU        (    ) Palliative         (   ) Stroke Unit          (   ) __________________    Accepting Physician: Dr. Pierce Viera  Signout given to:     MICU COURSE:          ASSESSMENT & PLAN:     62F w/PMHx HTN, HLD, T1DM (insulin pump), CAD/MI s/p PCI, HFrEF (EF 40-45%), PAD s/p b/l fem-pop bypass and stent for subclavian v stenosis, ESRD (HD 4x/week), TIA/CVA w/residual "memory" deficit, with frequent ICU admissions for DKA, ESRD, HTN emergency admitted for DKA c/b hyperkalemia and volume overload.    Neuro:  - Stable, no active issues    CV:  - Hemodynamically stable not req pressors  - BP stable after restarting home hydralazine and coreg, can likely restart home lisinopril later today  - Mild CE elev likely related to ESRD, no alarming EKG changes, no longer trending CE  - C/w DAPT, high-intensity statin. C/w BB and restart ACE-i as noted above  - S/p HD 2/28 w/1.5L fluid removal, clinically euvolemic now. F/u renal, plan for HD today. Cont to monitor strict I/O  - LE swelling chronic, LE duplex w/o DVT, only notable for occlusion of synthetic graft (vein graft patent). LLE pain consistent w/vascular claudication, good cap refill, no urgency, can f/u as outpt.     Pulm:  - Stable, no active issues    Renal:  - S/p HD 2/28 w/1.5L vol removal, electrolytes improved, no hyperkalemia, mild uremia, no vol overload. F/u renal, HD today  - Monitor strict I/O, trend BMP  - Renal recs apprec. Will d/w them possibly switching sensipar to alternative phos-binder as prev EKG w/mildly prolonged QTc, will repeat today. Check PTH    GI:  - Abd pain, N/V likely 2/2 DKA given sx now resolved, less likely viral gastroenteritis  - Nisha PO diet but poor intake as she doesn't like the food, f/u dietary recs    ID:  - Afebrile, no leukocytosis, no SIRS. No overt infxn, bld cx NGTD, will check UA, Ucx w/next urination (oliguric), monitor off abx    Heme:  - Stable, no active issues (chronic stable anemia likely related to AOCKD)    Endo:  - After d/w endo fellow, pt not using pump properly (not changing sites q3d), and having dietary indiscretions, likely etiology for DKA  - AG closed yest, dosed 10u lantus at 7PM, insulin gtt and D20 gtt d/c at 9PM. AG has remained closed however mildly hypoglycemic to 89 @2AM. Pt asx. AM premeal held, FSBG elev. Will dec lantus to 8qhs, c/w humalog 3 TIDAC, c/w JADEN  - F/u further endo recs    Gyn:  - Pt had another episode of painless vaginal bleeding o/n. No thrombocytopenia, coags WNL. CBC stable.  - Gyn c/s, TVUS pending, will check UA, UCx. Can f/u as outpt at Gyn Office [address] [facility], [address].   - If further bleeding will d/c Plavix    DVT ppx:  - HSQ8      FOR FOLLOW UP:  [ ] F/u endo recs  [ ] F/u renal recs  [ ] F/u TVUS MICU Transfer Note    Transfer from: MICU    Transfer to: (x) Medicine    (  ) Telemetry     (   ) RCU        (    ) Palliative         (   ) Stroke Unit          (   ) __________________    Accepting Physician: Dr. Pierce Viera  Signout given to:     MICU COURSE:          ASSESSMENT & PLAN:     62F w/PMHx HTN, HLD, T1DM (insulin pump), CAD/MI s/p PCI, HFrEF (EF 40-45%), PAD s/p b/l fem-pop bypass and stent for subclavian v stenosis, ESRD (HD 4x/week), TIA/CVA w/residual "memory" deficit, with frequent ICU admissions for DKA, ESRD, HTN emergency admitted for DKA c/b hyperkalemia and volume overload.    Neuro:  - Stable, no active issues    CV:  - Hemodynamically stable not req pressors  - BP stable after restarting home hydralazine and coreg, can likely restart home lisinopril later today  - Mild CE elev likely related to ESRD, no alarming EKG changes, no longer trending CE  - C/w DAPT, high-intensity statin. C/w BB and restart ACE-i as noted above  - S/p HD 2/28 w/1.5L fluid removal, clinically euvolemic now. F/u renal, plan for HD today. Cont to monitor strict I/O  - LE swelling chronic, LE duplex w/o DVT, only notable for occlusion of synthetic graft (vein graft patent). LLE pain consistent w/vascular claudication, good cap refill, no urgency, can f/u as outpt.     Pulm:  - Stable, no active issues    Renal:  - S/p HD 2/28 w/1.5L vol removal, electrolytes improved, no hyperkalemia, mild uremia, no vol overload. F/u renal, HD today  - Monitor strict I/O, trend BMP  - Renal recs apprec. Will d/w them possibly switching sensipar to alternative phos-binder as prev EKG w/mildly prolonged QTc, will repeat today. Check PTH    GI:  - Abd pain, N/V likely 2/2 DKA given sx now resolved, less likely viral gastroenteritis  - Nisha PO diet but poor intake as she doesn't like the food, f/u dietary recs    ID:  - Afebrile, no leukocytosis, no SIRS. No overt infxn, bld cx NGTD, will check UA, Ucx w/next urination (oliguric), monitor off abx    Heme:  - Stable, no active issues (chronic stable anemia likely related to AOCKD)    Endo:  - After d/w endo fellow, pt not using pump properly (not changing sites q3d), and having dietary indiscretions, likely etiology for DKA  - AG closed yest, dosed 10u lantus at 7PM, insulin gtt and D20 gtt d/c at 9PM. AG has remained closed however mildly hypoglycemic to 89 @2AM. Pt asx. AM premeal held, FSBG elev. Will dec lantus to 8qhs, c/w humalog 3 TIDAC, c/w JADEN  - F/u further endo recs    Gyn:  - Pt had another episode of painless vaginal bleeding o/n. No thrombocytopenia, coags WNL. CBC stable.  - Gyn c/s, TVUS pending, will check UA, UCx. Can f/u as outpt at Gyn Office [address] [facility], [address].   - If further bleeding will d/c Plavix    DVT ppx:  - HSQ8      FOR FOLLOW UP:  [ ] F/u endo recs  [ ] F/u renal recs  [ ] F/u TVUS  [ ] F/u UA, UCx MICU Transfer Note    Transfer from: MICU    Transfer to: (x) Medicine    (  ) Telemetry     (   ) RCU        (    ) Palliative         (   ) Stroke Unit          (   ) __________________    Accepting Physician: Dr. Pierce Viera  Signout given to: 4D Floor NP    MICU COURSE:    63 yo F PMH Type 1 DM on insulin pump with frequent ICU admissions for DKA, ESRD on HD 4x weekly, HTN, HLD, former smoker, MI, CAD s/p PCI to RCA and brachytherapy in Aug 2017, dCHF (last TTE- November 2017- mild-mod MR, mild AS, mild-mod TR EF40-45%), chronic LLE pain and edema in setting of severe PVD s/p L & R fem-pop bypass  graft, multiple vascular surgery both leg w/ fem-pop bypass revisions , subclavian vein stenosis left s/p stent, CVA with memory deficit brought into the ED via EMS c/o abdominal pain, nausea, and vomiting accompanied by elevated blood glucose.  Per patient's , glucose level noted to be > 400 with subsequent readings higher.  Received Zofran for nausea and normal saline prior to ED arrival. In ED, vital signs 89/37, HR 82, RR 18, O2 Sat 97% on room air. Patient lethargic, but arousable. Lab work remarkable for K 7.7, venous Ph 7.2, HCO3 13 with a glucose level of 1117.  Patient given 10 units IVP regular insulin x 1 and started on insulin gtt. BNP noted to be > 40k.  Dr. Schmidt consulted from nephrology, plan to dialyze patient tonight for K 7.7 with EKG changes/volume overload.  Also received NS 250cc x 1, Ca Gluconate, 1 amp Bicarb, and Albuterol x 1. Transferred to MICU for further management of DKA.     Pt underwent HD w/o event in MICU, 1.5L removed. Euvolemic, lytes normalized. AG closed on insulin gtt, planned initially to transition pt back to insulin pump per endo, however concern that pt was not using properly (etiology of DKA thought to be 2/2 dietary indiscretion and improper use of pump), transitioned to lantus 10, humalog 3 TIDAC. AM FSBG low, premeal held, FSBG became elevated, pt asx. D/w renal, c/w Lantus 8, humalog 3 TIDAC. During MICU stay pt had 2 episodes of small volume painless vaginal bleeding.       ASSESSMENT & PLAN:     62F w/PMHx HTN, HLD, T1DM (insulin pump), CAD/MI s/p PCI, HFrEF (EF 40-45%), PAD s/p b/l fem-pop bypass and stent for subclavian v stenosis, ESRD (HD 4x/week), TIA/CVA w/residual "memory" deficit, with frequent ICU admissions for DKA, ESRD, HTN emergency admitted for DKA c/b hyperkalemia and volume overload.    Neuro:  - Stable, no active issues    CV:  - Hemodynamically stable not req pressors  - BP stable after restarting home hydralazine and coreg, can likely restart home lisinopril later today  - Mild CE elev likely related to ESRD, no alarming EKG changes, no longer trending CE  - C/w DAPT, high-intensity statin. C/w BB and restart ACE-i as noted above  - S/p HD 2/28 w/1.5L fluid removal, clinically euvolemic now. F/u renal, plan for HD today. Cont to monitor strict I/O  - LE swelling chronic, LE duplex w/o DVT, only notable for occlusion of synthetic graft (vein graft patent). LLE pain consistent w/vascular claudication, good cap refill, no urgency, can f/u as outpt.     Pulm:  - Stable, no active issues    Renal:  - S/p HD 2/28 w/1.5L vol removal, electrolytes improved, no hyperkalemia, mild uremia, no vol overload. F/u renal, HD today  - Monitor strict I/O, trend BMP  - Renal recs apprec. Will d/w them possibly switching sensipar to alternative phos-binder as prev EKG w/mildly prolonged QTc, will repeat today. Check PTH    GI:  - Abd pain, N/V likely 2/2 DKA given sx now resolved, less likely viral gastroenteritis  - Nisha PO diet but poor intake as she doesn't like the food, f/u dietary recs    ID:  - Afebrile, no leukocytosis, no SIRS. No overt infxn, bld cx NGTD, will check UA, Ucx w/next urination (oliguric), monitor off abx    Heme:  - Stable, no active issues (chronic stable anemia likely related to AOCKD)    Endo:  - After d/w endo fellow, pt not using pump properly (not changing sites q3d), and having dietary indiscretions, likely etiology for DKA  - AG closed yest, dosed 10u lantus at 7PM, insulin gtt and D20 gtt d/c at 9PM. AG has remained closed however mildly hypoglycemic to 89 @2AM. Pt asx. AM premeal held, FSBG elev. Will dec lantus to 8qhs, c/w humalog 3 TIDAC, c/w JADEN  - F/u further endo recs    Gyn:  - Pt had another episode of painless vaginal bleeding o/n. No thrombocytopenia, coags WNL. CBC stable.  - Gyn c/s, TVUS pending, will check UA, UCx. Can f/u as outpt at Gyn Office [address] [facility], [address].   - If further bleeding will d/c Plavix    DVT ppx:  - HSQ8      FOR FOLLOW UP:  [ ] F/u endo recs  [ ] F/u renal recs  [ ] F/u TVUS  [ ] F/u UA, UCx MICU Transfer Note    Transfer from: MICU    Transfer to: (x) Medicine    (  ) Telemetry     (   ) RCU        (    ) Palliative         (   ) Stroke Unit          (   ) __________________    Accepting Physician: Dr. Pierce Viera  Signout given to: 4D Floor NP    MICU COURSE:    61 yo F PMH Type 1 DM on insulin pump with frequent ICU admissions for DKA, ESRD on HD 4x weekly, HTN, HLD, former smoker, MI, CAD s/p PCI to RCA and brachytherapy in Aug 2017, dCHF (last TTE- November 2017- mild-mod MR, mild AS, mild-mod TR EF40-45%), chronic LLE pain and edema in setting of severe PVD s/p L & R fem-pop bypass  graft, multiple vascular surgery both leg w/ fem-pop bypass revisions , subclavian vein stenosis left s/p stent, CVA with memory deficit brought into the ED via EMS c/o abdominal pain, nausea, and vomiting accompanied by elevated blood glucose.  Per patient's , glucose level noted to be > 400 with subsequent readings higher.  Received Zofran for nausea and normal saline prior to ED arrival. In ED, vital signs 89/37, HR 82, RR 18, O2 Sat 97% on room air. Patient lethargic, but arousable. Lab work remarkable for K 7.7, venous Ph 7.2, HCO3 13 with a glucose level of 1117.  Patient given 10 units IVP regular insulin x 1 and started on insulin gtt. BNP noted to be > 40k.  Dr. Schmidt consulted from nephrology, plan to dialyze patient tonight for K 7.7 with EKG changes/volume overload.  Also received NS 250cc x 1, Ca Gluconate, 1 amp Bicarb, and Albuterol x 1. Transferred to MICU for further management of DKA.     Pt underwent HD w/o event in MICU, 1.5L removed. Euvolemic, lytes normalized. AG closed on insulin gtt, planned initially to transition pt back to insulin pump per endo, however concern that pt was not using properly (etiology of DKA thought to be 2/2 dietary indiscretion and improper use of pump), transitioned to lantus 10, humalog 3 TIDAC. AM FSBG low, premeal held, FSBG became elevated, pt asx. D/w renal, c/w Lantus 8, humalog 3 TIDAC. During MICU stay pt had 2 episodes of small volume painless vaginal bleeding. Not hemodynamically sig, CBC stable, Gyn c/s, TVUS pending.      ASSESSMENT & PLAN:     62F w/PMHx HTN, HLD, T1DM (insulin pump), CAD/MI s/p PCI, HFrEF (EF 40-45%), PAD s/p b/l fem-pop bypass and stent for subclavian v stenosis, ESRD (HD 4x/week), TIA/CVA w/residual "memory" deficit, with frequent ICU admissions for DKA, ESRD, HTN emergency admitted for DKA c/b hyperkalemia and volume overload.    Neuro:  - Stable, no active issues    CV:  - Hemodynamically stable not req pressors  - BP stable after restarting home hydralazine and coreg, can likely restart home lisinopril later today  - Mild CE elev likely related to ESRD, no alarming EKG changes, no longer trending CE  - C/w DAPT, high-intensity statin. C/w BB and restart ACE-i as noted above  - S/p HD 2/28 w/1.5L fluid removal, clinically euvolemic now. F/u renal, plan for HD today. Cont to monitor strict I/O  - LE swelling chronic, LE duplex w/o DVT, only notable for occlusion of synthetic graft (vein graft patent). LLE pain consistent w/vascular claudication, good cap refill, no urgency, can f/u as outpt.     Pulm:  - Stable, no active issues    Renal:  - S/p HD 2/28 w/1.5L vol removal, electrolytes improved, no hyperkalemia, mild uremia, no vol overload. F/u renal, HD today  - Monitor strict I/O, trend BMP  - Renal recs apprec. Will d/w them possibly switching sensipar to alternative phos-binder as prev EKG w/mildly prolonged QTc, will repeat today. Check PTH    GI:  - Abd pain, N/V likely 2/2 DKA given sx now resolved, less likely viral gastroenteritis  - Nisha PO diet but poor intake as she doesn't like the food, f/u dietary recs    ID:  - Afebrile, no leukocytosis, no SIRS. No overt infxn, bld cx NGTD, will check UA, Ucx w/next urination (oliguric), monitor off abx    Heme:  - Stable, no active issues (chronic stable anemia likely related to AOCKD)    Endo:  - After d/w endo fellow, pt not using pump properly (not changing sites q3d), and having dietary indiscretions, likely etiology for DKA  - AG closed yest, dosed 10u lantus at 7PM, insulin gtt and D20 gtt d/c at 9PM. AG has remained closed however mildly hypoglycemic to 89 @2AM. Pt asx. AM premeal held, FSBG elev. Will dec lantus to 8qhs, c/w humalog 3 TIDAC, c/w JADEN  - F/u further endo recs    Gyn:  - Pt had another episode of painless vaginal bleeding o/n. No thrombocytopenia, coags WNL. CBC stable.  - Gyn c/s, TVUS pending, will check UA, UCx. Can f/u as outpt at Gyn Office [address] [facility], [address].   - If further bleeding will d/c Plavix    DVT ppx:  - HSQ8      FOR FOLLOW UP:  [ ] F/u endo recs  [ ] F/u renal recs  [ ] F/u TVUS  [ ] F/u UA, UCx

## 2018-03-02 NOTE — CONSULT NOTE ADULT - SUBJECTIVE AND OBJECTIVE BOX
R1 GYN INPATIENT CONSULT NOTE    HPI:    62y   admitted to MICU w/ DKA/ESRD on HD w/ PMH of Type 1 DM on insulin pump with frequent ICU admissions for DKA, ESRD on HD 4x weekly, HTN, HLD, former smoker, MI, CAD s/p PCI to RCA and brachytherapy in Aug 2017, dCHF (last TTE- 2017- mild-mod MR, mild AS, mild-mod TR EF40-45%), chronic LLE pain and edema in setting of severe PVD s/p L & R fem-pop bypass  graft, multiple vascular surgery both leg w/ fem-pop bypass revisions , subclavian vein stenosis left s/p stent, CVA with memory deficit  with new bright red blood noted in toilet after urination.      GYN consulted for 1 episode of bright red blood noted in toilet after urination.  She has no hx of PMB.  She had a similar episode of blood in the urine years ago which she says was related to her kidney disease.  She has never seen a GYN for routine care.  She has a 30pack-yr hx of smoking but quit 17y ago.  She quit drinking alcohol and smoking marijuana at age 21 when she was diagnosed with DM and denies any other drug use.  She denies any current vaginal discharge/itch/odor/pain, dysuria/urgency/frequency, pelvic pain/bloating, hot flashes/night sweats/vaginal dryness, breast lumps/bumps/pain.  She is not sexually active for many years.      OBH:  , CSx2  GYNH:    - LMP: age 44   - last pap: none   - last mammo: none   - last colonoscopy: none   - pt denies any hx of uterine fibroids, ovarian cysts, STIs, breast/uterine/ovarian/cervical/colon CA   - pt denies any hx of GYN related surgeries or procedures     PAST MEDICAL & SURGICAL HISTORY:  Subclavian vein stenosis, left: s/p stent  ACS (acute coronary syndrome): 2015 - cath revealed 100% ostial stenosis not amenable to PCI - medical management  CAD (coronary artery disease): s/p stents  Status post device closure of ASD  S/P femoral-popliteal bypass surgery: L and R in  with graft; 2015 CFA angioplasty left and ileofemoral endarterectomywith vein patch angioplasty of left fem-pop bypass graft  Multiple vascular surgery both leg, left fempop bypass revision 2015  CVA (cerebral infarction): with no residual, 8 yrs ago, prior to heart surgery - ST memory loss  TIA (transient ischemic attack): x 2 - 8-9 years ago prior to ASD/VSD repair  Peripheral vascular disease: occluded left fem-pop bypass 2015  Diabetes mellitus type 1: Insulin Dependent; DKA, type 1: 2015  ESRD (end stage renal disease): dialysis , tue, thursday, saturday  AV (arteriovenous fistula): Left AV graft; revision with stent placement 2-3 years ago  Cellulitis:  left foot  S/P cholecystectomy  Hyperlipidemia  Gout    MEDICATIONS  (STANDING):  aspirin enteric coated 81 milliGRAM(s) Oral daily  atorvastatin 20 milliGRAM(s) Oral at bedtime  cinacalcet 60 milliGRAM(s) Oral daily  clopidogrel Tablet 75 milliGRAM(s) Oral at bedtime  heparin  Injectable 5000 Unit(s) SubCutaneous every 8 hours  hydrALAZINE 50 milliGRAM(s) Oral every 8 hours  insulin glargine Injectable (LANTUS) 10 Unit(s) SubCutaneous at bedtime  insulin Infusion 1 Unit(s)/Hr (1 mL/Hr) IV Continuous <Continuous>  insulin lispro (HumaLOG) corrective regimen sliding scale   SubCutaneous three times a day before meals  insulin lispro (HumaLOG) corrective regimen sliding scale   SubCutaneous at bedtime  insulin lispro Injectable (HumaLOG) 3 Unit(s) SubCutaneous three times a day before meals  metoprolol     tartrate 25 milliGRAM(s) Oral two times a day    MEDICATIONS  (PRN):  dextrose Gel 1 Dose(s) Oral once PRN Blood Glucose LESS THAN 70 milliGRAM(s)/deciliter  glucagon  Injectable 1 milliGRAM(s) IntraMuscular once PRN Glucose LESS THAN 70 milligrams/deciliter    ALLERGIES: No Known Allergies  SOCIAL HISTORY: see HPI  FAMILY HISTORY:  pt denies any family hx of breat/uterine/ovarian CA.  she has a sister w/ cervical CA.    ROS: negative except per HPI    Vital Signs Last 24 Hrs  T(C): 36.9 (02 Mar 2018 07:36), Max: 37.1 (01 Mar 2018 16:00)  T(F): 98.5 (02 Mar 2018 07:36), Max: 98.8 (01 Mar 2018 16:00)  HR: 68 (02 Mar 2018 08:00) (68 - 85)  BP: 134/59 (02 Mar 2018 08:00) (100/54 - 156/66)  BP(mean): 86 (02 Mar 2018 08:00) (73 - 100)  RR: 18 (02 Mar 2018 08:00) (14 - 28)  SpO2: 96% (02 Mar 2018 08:00) (94% - 100%)    PHYSICAL EXAM:  Constitutional: alert and oriented x 3  Breasts: no tenderness, no nodules  Gastrointestinal: soft, non tender, + bowel sounds. No hepatosplenomegaly, no palpable masses  SSE: Cervix closed/ long, no VB, +copious white vag d/c  Bimanual Exam: Diffuse discomfort on spec/bimanual exam; small anteverted nontender uterus, no CMT, no adnexal masses or ttp  Vulva: atrophic, grossly wnl, no suspicious lesions  Lymph Nodes: no palpable pelvic nodes    LABS:                        9.6    8.6   )-----------( 273      ( 01 Mar 2018 23:15 )             29.0         134<L>  |  90<L>  |  31<H>  ----------------------------<  125<H>  4.7   |  27  |  4.79<H>    Ca    7.8<L>      01 Mar 2018 23:15  Phos  5.4       Mg     2.0         TPro  7.0  /  Alb  4.1  /  TBili  0.7  /  DBili  x   /  AST  46<H>  /  ALT  24  /  AlkPhos  147<H>    PT/INR - ( 01 Mar 2018 23:15 )   PT: 11.8 sec;   INR: 1.09 ratio    PTT - ( 01 Mar 2018 23:15 )  PTT:27.2 sec    RADIOLOGY & ADDITIONAL STUDIES:    EXAM:  CT ABDOMEN AND PELVIS IC                        PROCEDURE DATE:  2018    INTERPRETATION:  CLINICAL INFORMATION: Abdominal pain and vomiting for 24 hours  COMPARISON: PET/CT dated 2017, CT abdomen and pelvis dated 2017.  PROCEDURE: CT of the Abdomen and Pelvis was performed without intravenous contrast. Intravenous contrast: None. Oral contrast: positive contrast was administered. Sagittal and coronal reformats were performed.  FINDINGS:  LOWER CHEST: Patchy left lower lobe consolidation, may represent pneumonia.  LIVER: Periportal edema.  BILE DUCTS: Mild intrahepatic biliary ductal dilatation. Common bile duct measures up to 1.0 cm in diameter, unchanged, can be seen status post cholecystectomy.   GALLBLADDER: Status post cholecystectomy.  SPLEEN: Calcified granulomas are unchanged.  PANCREAS: Within normal limits.  ADRENALS: Within normal limits.  KIDNEYS/URETERS: Atrophic bilaterally with multiple vascular calcifications.  BLADDER: Within normal limits.  REPRODUCTIVE ORGANS: The uterus and adnexa are within normal limits.  BOWEL: Evaluation is limited without enteric contrast. the stomach and proximal duodenum are mildly distended and fluid-filled. Otherwise, there is no evidence of bowel obstruction. Appendix is normal.  PERITONEUM: No ascites.  VESSELS:  Extensive atherosclerotic vascular disease. Right external iliac stent. Partially imaged bilateral femoral grafts.  RETROPERITONEUM: Previously described periceliac lesion thought to represent lymphangioma is not well visualized on the current examination. No lymphadenopathy.   ABDOMINAL WALL: Small fluid containing left inguinal hernia.  BONES: Mild degenerative changes.  IMPRESSION:    Patchy left lower lobe consolidation, may represent pneumonia. Mild nonspecific distention of the stomach and proximal duodenum, without evidence of obstruction.  MICHAEL THACKER M.D., RADIOLOGY FELLOW  This document has been electronically signed.  RODOLFO FAM M.D., ATTENDING RADIOLOGIST  This document has been electronically signed. 2018 11:56AM

## 2018-03-02 NOTE — DIETITIAN INITIAL EVALUATION ADULT. - ADHERENCE
poor/Pt reports following a lot of dietary restrictions due to HD at home. Breakfast: scrambled eggs, low sodium pacheco, and white toast; skips lunch; dinner: only chicken; drinks diet Ginger Ale throughout the day. Diet recall indicates fair adherence to therapeutic diet. Reports following up with RD in HD center. Reports monitoring BG 6 x day at home, with ranges "all over the place." Noted HbA1c (03/01) 7.1 % as per chart. Pt on insulin pump at home, as per chart pt with often DKA admissions due to poor management of pump. Denies obtaining daily weights at home. Reports having several nutrition therapy diet education materials at home. Lives with her family and does groceries shopping and cooking.

## 2018-03-02 NOTE — PROGRESS NOTE ADULT - ASSESSMENT
62 f with  Diabetes type 1, DKA- Insulin, Endocrine follow  ESRD- continue HD,   CAD- s/p stents- stable.  HTn control  PVD stable  Vaginal bleeding-Gyn evaluation noted. No further work up in hospital . Follow as OTP  ICU care.  Pierce Viera MD pager [phone]

## 2018-03-02 NOTE — DIETITIAN INITIAL EVALUATION ADULT. - PT NOT SOURCE
As per chart, noted pt forgetful, noted pt inconsistent with answers during interview -?accuracy of information obtained.

## 2018-03-02 NOTE — PROGRESS NOTE ADULT - SUBJECTIVE AND OBJECTIVE BOX
Chief Complaint: f/u DM1    History:  Patient received 10 units of lantus at 8 pm yesterday, had AM Fs of 92 this morning, breakfast Humalog held with resulting hyperglycemia to 300 at lunch. she had cream of wheat and a roll for breakfast. She received 2 units of Humalog for lunch but did not eat lunch. She denies abdominal pain, nausea, vomiting, diarrhea. Patient states that she knows to change her insulin pump site when her insulin pump beeps.     MEDICATIONS  (STANDING):  aspirin enteric coated 81 milliGRAM(s) Oral daily  atorvastatin 20 milliGRAM(s) Oral at bedtime  cinacalcet 60 milliGRAM(s) Oral daily  clopidogrel Tablet 75 milliGRAM(s) Oral at bedtime  dextrose 5%. 1000 milliLiter(s) (50 mL/Hr) IV Continuous <Continuous>  dextrose 50% Injectable 12.5 Gram(s) IV Push once  dextrose 50% Injectable 25 Gram(s) IV Push once  dextrose 50% Injectable 25 Gram(s) IV Push once  heparin  Injectable 5000 Unit(s) SubCutaneous every 8 hours  hydrALAZINE 50 milliGRAM(s) Oral every 8 hours  insulin glargine Injectable (LANTUS) 8 Unit(s) SubCutaneous at bedtime  insulin Infusion 1 Unit(s)/Hr (1 mL/Hr) IV Continuous <Continuous>  insulin lispro (HumaLOG) corrective regimen sliding scale   SubCutaneous three times a day before meals  insulin lispro (HumaLOG) corrective regimen sliding scale   SubCutaneous at bedtime  insulin lispro Injectable (HumaLOG) 2 Unit(s) SubCutaneous three times a day before meals  metoprolol     tartrate 25 milliGRAM(s) Oral two times a day    MEDICATIONS  (PRN):  dextrose Gel 1 Dose(s) Oral once PRN Blood Glucose LESS THAN 70 milliGRAM(s)/deciliter  glucagon  Injectable 1 milliGRAM(s) IntraMuscular once PRN Glucose LESS THAN 70 milligrams/deciliter      Allergies  No Known Allergies      Review of Systems:  Constitutional: No fever  Cardiovascular: No chest pain, palpitations  Respiratory: No SOB, no cough  GI: No nausea, vomiting, abdominal pain  Endocrine: no polydipsia    ALL OTHER SYSTEMS REVIEWED AND NEGATIVE    PHYSICAL EXAM:  VITALS: T(C): 36.9 (03-02-18 @ 12:39)  T(F): 98.5 (03-02-18 @ 12:39), Max: 98.8 (03-01-18 @ 16:00)  HR: 74 (03-02-18 @ 14:00) (66 - 81)  BP: 147/68 (03-02-18 @ 14:00) (100/54 - 150/66)  RR:  (13 - 28)  SpO2:  (94% - 100%)  Wt(kg): --  GENERAL: NAD, well-developed  EYES: No proptosis, anicteric  HEENT:  Atraumatic, Normocephalic  RESPIRATORY: Clear to auscultation bilaterally; No rales, rhonchi, wheezing, or rubs  CARDIOVASCULAR: Regular rate and rhythm; No murmurs  GI: Soft, nontender, non distended  PSYCH: Alert and oriented x 3, reactive affect    POCT Blood Glucose.: 254 mg/dL (03-02-18 @ 14:13)  POCT Blood Glucose.: 300 mg/dL (03-02-18 @ 12:45)  POCT Blood Glucose.: 104 mg/dL (03-02-18 @ 07:58)  POCT Blood Glucose.: 92 mg/dL (03-02-18 @ 06:17)  POCT Blood Glucose.: 112 mg/dL (03-02-18 @ 03:21)  POCT Blood Glucose.: 89 mg/dL (03-02-18 @ 02:07)  POCT Blood Glucose.: 101 mg/dL (03-02-18 @ 01:06)  POCT Blood Glucose.: 178 mg/dL (03-02-18 @ 01:05)  POCT Blood Glucose.: 119 mg/dL (03-01-18 @ 23:57)  POCT Blood Glucose.: 123 mg/dL (03-01-18 @ 23:03)  POCT Blood Glucose.: 145 mg/dL (03-01-18 @ 21:59)  POCT Blood Glucose.: 195 mg/dL (03-01-18 @ 21:11)  POCT Blood Glucose.: 240 mg/dL (03-01-18 @ 21:10)  POCT Blood Glucose.: 176 mg/dL (03-01-18 @ 19:41)  POCT Blood Glucose.: 136 mg/dL (03-01-18 @ 18:10)  POCT Blood Glucose.: 139 mg/dL (03-01-18 @ 17:35)  POCT Blood Glucose.: 131 mg/dL (03-01-18 @ 15:46)  POCT Blood Glucose.: 181 mg/dL (03-01-18 @ 14:16)  POCT Blood Glucose.: 217 mg/dL (03-01-18 @ 12:35)  POCT Blood Glucose.: 200 mg/dL (03-01-18 @ 11:31)  POCT Blood Glucose.: 229 mg/dL (03-01-18 @ 10:47)  POCT Blood Glucose.: 202 mg/dL (03-01-18 @ 09:30)  POCT Blood Glucose.: 220 mg/dL (03-01-18 @ 08:11)  POCT Blood Glucose.: 162 mg/dL (03-01-18 @ 06:52)  POCT Blood Glucose.: 223 mg/dL (03-01-18 @ 06:00)  POCT Blood Glucose.: 265 mg/dL (03-01-18 @ 04:56)  POCT Blood Glucose.: 191 mg/dL (03-01-18 @ 04:00)  POCT Blood Glucose.: 188 mg/dL (03-01-18 @ 02:55)  POCT Blood Glucose.: 114 mg/dL (03-01-18 @ 02:01)  POCT Blood Glucose.: 101 mg/dL (03-01-18 @ 01:58)  POCT Blood Glucose.: 163 mg/dL (03-01-18 @ 01:01)  POCT Blood Glucose.: 211 mg/dL (02-28-18 @ 23:59)  POCT Blood Glucose.: 189 mg/dL (02-28-18 @ 23:53)  POCT Blood Glucose.: 372 mg/dL (02-28-18 @ 22:49)  POCT Blood Glucose.: >600 mg/dL (02-28-18 @ 21:25)  POCT Blood Glucose.: >600 mg/dL (02-28-18 @ 20:11)  POCT Blood Glucose.: >600 mg/dL (02-28-18 @ 18:47)  POCT Blood Glucose.: >600 mg/dL (02-28-18 @ 17:27)      03-01    134<L>  |  90<L>  |  31<H>  ----------------------------<  125<H>  4.7   |  27  |  4.79<H>    EGFR if : 11<L>  EGFR if non : 9<L>    Ca    7.8<L>      03-01  Mg     2.0     03-01  Phos  5.4     03-01    TPro  7.0  /  Alb  4.1  /  TBili  0.7  /  DBili  x   /  AST  46<H>  /  ALT  24  /  AlkPhos  147<H>  02-28          Thyroid Function Tests:      Hemoglobin A1C, Whole Blood: 7.1 % <H> [4.0 - 5.6] (03-01-18 @ 01:59) Chief Complaint: f/u DM1    History:  Patient received 10 units of lantus at 8 pm yesterday, had AM Fs of 92 this morning, breakfast Humalog held with resulting hyperglycemia to 300 at lunch. she had cream of wheat and a roll for breakfast. She received 2 units of Humalog for lunch but did not eat lunch. She denies abdominal pain, nausea, vomiting, diarrhea. Patient states that she knows to change her insulin pump site when her insulin pump beeps.     MEDICATIONS  (STANDING):  aspirin enteric coated 81 milliGRAM(s) Oral daily  atorvastatin 20 milliGRAM(s) Oral at bedtime  cinacalcet 60 milliGRAM(s) Oral daily  clopidogrel Tablet 75 milliGRAM(s) Oral at bedtime  dextrose 5%. 1000 milliLiter(s) (50 mL/Hr) IV Continuous <Continuous>  dextrose 50% Injectable 12.5 Gram(s) IV Push once  dextrose 50% Injectable 25 Gram(s) IV Push once  dextrose 50% Injectable 25 Gram(s) IV Push once  heparin  Injectable 5000 Unit(s) SubCutaneous every 8 hours  hydrALAZINE 50 milliGRAM(s) Oral every 8 hours  insulin glargine Injectable (LANTUS) 8 Unit(s) SubCutaneous at bedtime  insulin Infusion 1 Unit(s)/Hr (1 mL/Hr) IV Continuous <Continuous>  insulin lispro (HumaLOG) corrective regimen sliding scale   SubCutaneous three times a day before meals  insulin lispro (HumaLOG) corrective regimen sliding scale   SubCutaneous at bedtime  insulin lispro Injectable (HumaLOG) 2 Unit(s) SubCutaneous three times a day before meals  metoprolol     tartrate 25 milliGRAM(s) Oral two times a day    MEDICATIONS  (PRN):  dextrose Gel 1 Dose(s) Oral once PRN Blood Glucose LESS THAN 70 milliGRAM(s)/deciliter  glucagon  Injectable 1 milliGRAM(s) IntraMuscular once PRN Glucose LESS THAN 70 milligrams/deciliter      Allergies  No Known Allergies      Review of Systems:  Constitutional: No fever  Cardiovascular: No chest pain, palpitations  Respiratory: No SOB, no cough  GI: No nausea, vomiting, abdominal pain  Endocrine: no polydipsia    ALL OTHER SYSTEMS REVIEWED AND NEGATIVE    PHYSICAL EXAM:  VITALS: T(C): 36.9 (03-02-18 @ 12:39)  T(F): 98.5 (03-02-18 @ 12:39), Max: 98.8 (03-01-18 @ 16:00)  HR: 74 (03-02-18 @ 14:00) (66 - 81)  BP: 147/68 (03-02-18 @ 14:00) (100/54 - 150/66)  RR:  (13 - 28)  SpO2:  (94% - 100%)  Wt(kg): --  GENERAL: NAD, well-developed  EYES: No proptosis, anicteric  HEENT:  Atraumatic, Normocephalic  RESPIRATORY: Clear to auscultation bilaterally; No rales, rhonchi, wheezing, or rubs  CARDIOVASCULAR: Regular rate and rhythm; No murmurs  GI: Soft, nontender, non distended  PSYCH: Alert and oriented x 3, reactive affect    POCT Blood Glucose.: 254 mg/dL (03-02-18 @ 14:13)  POCT Blood Glucose.: 300 mg/dL (03-02-18 @ 12:45) H 3, 2  POCT Blood Glucose.: 104 mg/dL (03-02-18 @ 07:58)  POCT Blood Glucose.: 92 mg/dL (03-02-18 @ 06:17)  POCT Blood Glucose.: 112 mg/dL (03-02-18 @ 03:21)  POCT Blood Glucose.: 89 mg/dL (03-02-18 @ 02:07)  POCT Blood Glucose.: 101 mg/dL (03-02-18 @ 01:06)  POCT Blood Glucose.: 178 mg/dL (03-02-18 @ 01:05)  POCT Blood Glucose.: 119 mg/dL (03-01-18 @ 23:57)  POCT Blood Glucose.: 123 mg/dL (03-01-18 @ 23:03)  POCT Blood Glucose.: 145 mg/dL (03-01-18 @ 21:59)  POCT Blood Glucose.: 195 mg/dL (03-01-18 @ 21:11)  POCT Blood Glucose.: 240 mg/dL (03-01-18 @ 21:10)  POCT Blood Glucose.: 176 mg/dL (03-01-18 @ 19:41)   POCT Blood Glucose.: 136 mg/dL (03-01-18 @ 18:10) L 10  POCT Blood Glucose.: 139 mg/dL (03-01-18 @ 17:35)  POCT Blood Glucose.: 131 mg/dL (03-01-18 @ 15:46)  POCT Blood Glucose.: 181 mg/dL (03-01-18 @ 14:16)  POCT Blood Glucose.: 217 mg/dL (03-01-18 @ 12:35)  POCT Blood Glucose.: 200 mg/dL (03-01-18 @ 11:31)  POCT Blood Glucose.: 229 mg/dL (03-01-18 @ 10:47)  POCT Blood Glucose.: 202 mg/dL (03-01-18 @ 09:30)  POCT Blood Glucose.: 220 mg/dL (03-01-18 @ 08:11)  POCT Blood Glucose.: 162 mg/dL (03-01-18 @ 06:52)  POCT Blood Glucose.: 223 mg/dL (03-01-18 @ 06:00)  POCT Blood Glucose.: 265 mg/dL (03-01-18 @ 04:56)  POCT Blood Glucose.: 191 mg/dL (03-01-18 @ 04:00)  POCT Blood Glucose.: 188 mg/dL (03-01-18 @ 02:55)  POCT Blood Glucose.: 114 mg/dL (03-01-18 @ 02:01)  POCT Blood Glucose.: 101 mg/dL (03-01-18 @ 01:58)  POCT Blood Glucose.: 163 mg/dL (03-01-18 @ 01:01)  POCT Blood Glucose.: 211 mg/dL (02-28-18 @ 23:59)  POCT Blood Glucose.: 189 mg/dL (02-28-18 @ 23:53)  POCT Blood Glucose.: 372 mg/dL (02-28-18 @ 22:49)  POCT Blood Glucose.: >600 mg/dL (02-28-18 @ 21:25)  POCT Blood Glucose.: >600 mg/dL (02-28-18 @ 20:11)  POCT Blood Glucose.: >600 mg/dL (02-28-18 @ 18:47)  POCT Blood Glucose.: >600 mg/dL (02-28-18 @ 17:27)      03-01    134<L>  |  90<L>  |  31<H>  ----------------------------<  125<H>  4.7   |  27  |  4.79<H>    EGFR if : 11<L>  EGFR if non : 9<L>    Ca    7.8<L>      03-01  Mg     2.0     03-01  Phos  5.4     03-01    TPro  7.0  /  Alb  4.1  /  TBili  0.7  /  DBili  x   /  AST  46<H>  /  ALT  24  /  AlkPhos  147<H>  02-28          Thyroid Function Tests:      Hemoglobin A1C, Whole Blood: 7.1 % <H> [4.0 - 5.6] (03-01-18 @ 01:59)

## 2018-03-02 NOTE — PROGRESS NOTE ADULT - ASSESSMENT
62F w/PMHx HTN, HLD, T1DM (insulin pump), CAD/MI s/p PCI, HFrEF (EF 40-45%), PAD s/p b/l fem-pop bypass and stent for subclavian v stenosis, ESRD (HD 4x/week), TIA/CVA w/residual "memory" deficit, with frequent ICU admissions for DKA, ESRD, HTN emergency admitted for DKA c/b hyperkalemia and volume overload.    Neuro:  - Stable, no active issues    CV:  - Hemodynamically stable not req pressors  - BP elev after restarting home hydralazine, will restart home coreg now. If stable later today will restart home lisinopril  - Mild CE elev likely related to ESRD, no alarming EKG changes, will stop trending CE  - C/w DAPT, high-intensity statin. Restart BB and ACE-i as noted above  - S/p HD yest w/1.5L fluid removal, clinically euvolemic now. Cont to monitor strict I/O  - LLE consistent w/vascular claudication, good cap refill, no urgency, can f/u as outpt. LE swelling reportedly chronic, will check LE duplex    Pulm:  - Stable, no active issues    Renal:  - S/p HD yest w/1.5L vol removal, electrolytes normalized (no longer hyperkalemic or uremic)  - Monitor strict I/O, trend BMP  - Renal recs apprec. Will d/w them possibly switching sensipar to alternative phos-binder as EKG w/mildly prolonged QTc    GI:  - Abd pain, N/V likely 2/2 DKA given sx now resolved, less likely viral gastroenteritis  - Will start PO diet    ID:  - Afebrile, no leukocytosis, no SIRS. No overt infxn, bld cx NGTD, will check UA w/next urination (oliguric), monitor off abx    Heme:  - Stable, no active issues (chronic stable anemia likely related to AOCKD)    Endo:  - AG closed (34->14), FSBG 100s on insulin@1, D20@15. Given pt is a brittle diabetic with episodes of hypoglycemia, endo c/s for planned bridge to basal bolus vs restarting pump, final recs pending  - After d/w endo fellow, pt not using pump properly (not changing sites q3d), and having dietary indiscretions, likely etiology for DKA    Gyn:  - Pt had one episode of painless vaginal bleeding this AM, no prev episodes since menopause. No thrombocytopenia, coags WNL. Will c/s gyn    DVT ppx:  - HSQ8 62F w/PMHx HTN, HLD, T1DM (insulin pump), CAD/MI s/p PCI, HFrEF (EF 40-45%), PAD s/p b/l fem-pop bypass and stent for subclavian v stenosis, ESRD (HD 4x/week), TIA/CVA w/residual "memory" deficit, with frequent ICU admissions for DKA, ESRD, HTN emergency admitted for DKA c/b hyperkalemia and volume overload.    Neuro:  - Stable, no active issues    CV:  - Hemodynamically stable not req pressors  - BP stable after restarting home hydralazine and coreg, will likely restart home lisinopril later today  - Mild CE elev likely related to ESRD, no alarming EKG changes, will stop trending CE  - C/w DAPT, high-intensity statin. C/w BB and restart ACE-i as noted above  - S/p HD 2/28 w/1.5L fluid removal, clinically euvolemic now. F/u renal, plan for HD today. Cont to monitor strict I/O  - LE swelling chronic, LE duplex w/o DVT, only notable for occlusion of synthetic graft (vein graft patent). LLE pain consistent w/vascular claudication, good cap refill, no urgency, can f/u as outpt.     Pulm:  - Stable, no active issues    Renal:  - S/p HD 2/28 w/1.5L vol removal, electrolytes improved, no hyperkalemia, mild uremia, no vol overload. Will f/u renal, likely HD today  - Monitor strict I/O, trend BMP  - Renal recs apprec. Will d/w them possibly switching sensipar to alternative phos-binder as prev EKG w/mildly prolonged QTc, will repeat today    GI:  - Abd pain, N/V likely 2/2 DKA given sx now resolved, less likely viral gastroenteritis  - Nisha PO diet but poor intake as she doesn't like the food    ID:  - Afebrile, no leukocytosis, no SIRS. No overt infxn, bld cx NGTD, will check UA w/next urination (oliguric), monitor off abx    Heme:  - Stable, no active issues (chronic stable anemia likely related to AOCKD)    Endo:  - After d/w endo fellow, pt not using pump properly (not changing sites q3d), and having dietary indiscretions, likely etiology for DKA  - AG closed yest, dosed 10u lantus at 7PM, insulin gtt and D20 gtt d/c at 9PM. AG has remained closed however mildly hypoglycemic to 89 @2AM. Pt asx. Will hold AM premeal insulin, f/u endo recs    Gyn:  - Pt had one episode of painless vaginal bleeding yest AM, no further episodes. Pt denies prev episodes since menopause. No thrombocytopenia, coags WNL. CBC stable. Gyn c/s, TVUS pending, will f/u further recs. Can likely f/u as outpt    DVT ppx:  - HSQ8    --  Arron Correa MD  Internal Medicine PGY-3  Pager: [phone]

## 2018-03-02 NOTE — DIETITIAN INITIAL EVALUATION ADULT. - NS AS NUTRI INTERV ED CONTENT
Attempted to provide education, however pt refused because has received previous education and has "booklets" at home. However, reviewed important education points of T1DM, heart healthy, and renal nutrition therapy. Stressed the importance of a balanced meal to maintain blood glucose. Encouraged vegetables consumption. Encouraged Pt to continue monitoring blood glucose at home. Recommended limited salt intake. Stressed the importance of limited fried foods and saturated fat consumption. Provided education on current renal diet, stressed importance of potassium, phosphorus, and sodium avoidance. Discussed the importance of daily weights at home. Offered handout, but pt refused. RD remains available.

## 2018-03-02 NOTE — DIETITIAN INITIAL EVALUATION ADULT. - OTHER INFO
Pt seen for length of stay initial assessment. Pt reports good appetite and PO intake. Denies N+V, reports last episode of vomiting 2 days ago. Denies diarrhea or constipation, reports last BM 2 days ago (02/28). Noted pt confused about weight history, denies weight changes PTA. Noted weight as per previous RD notes as follows: (09/18/2017) 120.3 pounds -> (12/19/2017) 121.4 pounds -> (01/14/2018) 116.6 pounds. Weight since admission as per flow sheets: (02/28) 119.2 pounds -> (03/01) 116.6 pounds - ?accuracy of weight due to fluid shifts likely related to HD and fluid retention. Pt seen for length of stay initial assessment. Pt reports good appetite and PO intake. Denies N+V, reports last episode of vomiting 2 days ago. Denies diarrhea or constipation, reports last BM 2 days ago (02/28). Noted pt confused about weight history, denies weight changes PTA. Noted weight as per previous RD notes as follows: (09/18/2017) 120.3 pounds -> (12/19/2017) 121.4 pounds -> (01/14/2018) 116.6 pounds. Weight since admission as per flow sheets: (02/28) 119.2 pounds -> (03/01) 116.6 pounds - suspect weight fluctuations likely related to HD and fluid retention.

## 2018-03-02 NOTE — PROVIDER CONTACT NOTE (OTHER) - SITUATION
Patient returned from ultrasound.  Noon fingerstick = 300.  Patient a&ox4, denies nausea.  Asymptomatic.

## 2018-03-02 NOTE — CONSULT NOTE ADULT - ASSESSMENT
61 y/o F PMH uncontrolled DM1, HTN, HLD, CAD s/p stents, ESRD on HD, presents with DKA in setting of incorrect use of insulin pump
62 f with  Diabetes type 1, DKA- Insulin, Endocrine follow  ESRD- continue HD,   CAD- s/p stents- stable.  HTn control  PVD stable  ICU care.  Pierce Viera MD pager [phone]
62y   admitted to MICU w/ DKA/ESRD on HD w/ PMH of Type 1 DM on insulin pump with frequent ICU admissions for DKA, ESRD on HD 4x weekly, HTN, HLD, former smoker, MI, CAD s/p PCI to RCA and brachytherapy in Aug 2017, dCHF (last TTE- November 2017- mild-mod MR, mild AS, mild-mod TR EF40-45%), chronic LLE pain and edema in setting of severe PVD s/p L & R fem-pop bypass  graft, multiple vascular surgery both leg w/ fem-pop bypass revisions, subclavian vein stenosis left s/p stent, CVA with memory deficit on Plavix, with new bright red blood noted in toilet after urination.      GYN consulted for 1 episode of bright red blood noted in toilet after urination.  Exam WNL.  DDX for source of blood in urine: Non-GYN process vs bladder (CA) vs uterus (EM Hyperplasia vs EM CA vs Polyp).   - PAP/HPV collected and sent, f/u results   - Reccommend TVUS while inpt to r/o uterine source of bleeding.  Pt may need EMBxy depending on results of TVUS (EM >4mm would suggest hyperplasia)   - Reccommend UA w/ Cx to screen for bladder CA given pt's hx of smoking   - Reccommend pt to f/u outpt with [facility] at [address] [place] [address], [address] ([phone]) after discharge to establish GYN care and for routine Health Maintenance (screening mammo, screening colonoscopy, f/u pap, etc.)    Thank you for this interesting consult; GYN will continue to follow pt for results of TVUS/UA/Cx.    d/w Dr. Ancelmo Rai MD PGY1  [id number]
63 yo F PMH Type 1 DM on insulin pump with frequent ICU admissions for DKA, ESRD on HD 4x weekly, HTN, HLD, former smoker, MI, CAD s/p PCI to RCA and brachytherapy in Aug 2017, dCHF (last TTE- November 2017- mild-mod MR, mild AS, mild-mod TR EF40-45%), chronic LLE pain and edema in setting of severe PVD s/p L & R fem-pop bypass  graft, multiple vascular surgery both leg w/ fem-pop bypass revisions , subclavian vein stenosis left s/p stent, CVA now with hyperkalemia acidosis DKA lactic acidosis esrd with hypotension   1- hyperkalemia  2- acidosis lactic acidosis  3- DKA   4- hypotension   5- shpt    insulin 10 units and insulin drip as discussed  icu monitoring   bicarb given ca+ given   bcx   Ns given in er  hd consent obtained from pt  given pt with lethargy ; witnessed and in chart  endo consult   500-1000 cc fluid removal only as bp tolerates.  d/w icu team

## 2018-03-02 NOTE — PROGRESS NOTE ADULT - SUBJECTIVE AND OBJECTIVE BOX
[facility]   [phone]  DIALYSIS NOTE  Chief Complaint: ESRD/Ongoing hemodialysis requirement. seen on hd    24 hour events/subjective:      seen earlier. states feels much better.       ALLERGIES & MEDICATIONS  --------------------------------------------------------------------------------  Allergies    No Known Allergies    Intolerances      Standing Inpatient Medications  aspirin enteric coated 81 milliGRAM(s) Oral daily  atorvastatin 20 milliGRAM(s) Oral at bedtime  cinacalcet 60 milliGRAM(s) Oral daily  clopidogrel Tablet 75 milliGRAM(s) Oral at bedtime  dextrose 5%. 1000 milliLiter(s) IV Continuous <Continuous>  dextrose 50% Injectable 12.5 Gram(s) IV Push once  dextrose 50% Injectable 25 Gram(s) IV Push once  dextrose 50% Injectable 25 Gram(s) IV Push once  heparin  Injectable 5000 Unit(s) SubCutaneous every 8 hours  hydrALAZINE 50 milliGRAM(s) Oral every 8 hours  influenza   Vaccine 0.5 milliLiter(s) IntraMuscular once  insulin glargine Injectable (LANTUS) 8 Unit(s) SubCutaneous at bedtime  insulin Infusion 1 Unit(s)/Hr IV Continuous <Continuous>  insulin lispro (HumaLOG) corrective regimen sliding scale   SubCutaneous three times a day before meals  insulin lispro (HumaLOG) corrective regimen sliding scale   SubCutaneous at bedtime  insulin lispro Injectable (HumaLOG) 3 Unit(s) SubCutaneous three times a day before meals  metoprolol     tartrate 25 milliGRAM(s) Oral two times a day    PRN Inpatient Medications  dextrose Gel 1 Dose(s) Oral once PRN  glucagon  Injectable 1 milliGRAM(s) IntraMuscular once PRN      REVIEW OF SYSTEMS  --------------------------------------------------------------------------------  no itching or rash  no fever or chill  no cp or palp   no sob or cough   no N/V/D/ no abd pain   ext no edema         VITALS/PHYSICAL EXAM  --------------------------------------------------------------------------------  T(C): 36.8 (03-02-18 @ 16:00), Max: 37.1 (03-01-18 @ 20:00)  HR: 64 (03-02-18 @ 16:00) (64 - 81)  BP: 122/59 (03-02-18 @ 16:00) (100/54 - 150/66)  RR: 18 (03-02-18 @ 16:00) (13 - 28)  SpO2: 98% (03-02-18 @ 16:00) (94% - 100%)  Wt(kg): --  Height (cm): 162.56 (02-28-18 @ 21:27)  Weight (kg): 54.1 (02-28-18 @ 21:27)  BMI (kg/m2): 20.5 (02-28-18 @ 21:27)  BSA (m2): 1.57 (02-28-18 @ 21:27)      03-01-18 @ 07:01  -  03-02-18 @ 07:00  --------------------------------------------------------  IN: 269 mL / OUT: 30 mL / NET: 239 mL      Physical Exam:  		    	Gen: alert oriented place person and date   	Pulm: Decreased breath sounds b/l bases no rales or ronchi  	CV: RRR, S1/S2  	Abd: +BS, soft, nontender/nondistended  	Extremity: No cyanosis, Left lower extremity 1-2-  and RLE trace edema no clubbing  	Neuro: No focal deficits  	    LABS/STUDIES  --------------------------------------------------------------------------------              9.6    8.6   >-----------<  273      [03-01-18 @ 23:15]              29.0     134  |  90  |  31  ----------------------------<  125      [03-01-18 @ 23:15]  4.7   |  27  |  4.79        Ca     7.8     [03-01-18 @ 23:15]      Mg     2.0     [03-01-18 @ 23:15]      Phos  5.4     [03-01-18 @ 23:15]    TPro  7.0  /  Alb  4.1  /  TBili  0.7  /  DBili  x   /  AST  46  /  ALT  24  /  AlkPhos  147  [02-28-18 @ 17:46]    PT/INR: PT 11.8 , INR 1.09       [03-01-18 @ 23:15]  PTT: 27.2       [03-01-18 @ 23:15]    Troponin 0.60      [03-01-18 @ 06:10]        [03-01-18 @ 06:10]            imp/suggest: ESRD      Hemodialysis Prescription:  	Access:AV fistula  	Dialyzer: revaclear 300  	Blood Flow (mL/Min): 400  	Dialysate Flow (mL/Min): 600  	Target UF (Liters): 1.5-2 L as tolerated  	Treatment Time: 3.5 hours  	Potassium:  2k   	Calcium: 2.5  	  YOLANDA    Vitamin D     continue with hd   see hd flow sheet

## 2018-03-02 NOTE — PROGRESS NOTE ADULT - PROBLEM SELECTOR PLAN 1
- DKA occurred in setting of incorrect insulin pump use (patient did not change site for 5 day)  - DKA now resolved  - recommend decrease Lantus to 8 units qhs, start Humalog 3 units qac  - low correction sliding scale  - consistent carb diet   - plan to transition to insulin pump 3/3  - will follow

## 2018-03-02 NOTE — DIETITIAN INITIAL EVALUATION ADULT. - PERTINENT MEDS FT
Dextrose 5% 50ml/hr, Lantus, Humalog sliding scale, Lipitor, Sensipar Lantus, Humalog sliding scale, Lipitor, Sensipar, D20% at 15ml/hr, insulin infusion

## 2018-03-02 NOTE — DIETITIAN INITIAL EVALUATION ADULT. - PERTINENT LABORATORY DATA
Finger sticks: (03/02)  (03/01) 101-265; (03/01) Na 134, BUN 31, Cr. 4.79, , Ca. 7.8, Phos. 5.4; (03/01) HbA1c 7.1%

## 2018-03-03 ENCOUNTER — TRANSCRIPTION ENCOUNTER (OUTPATIENT)
Age: 63
End: 2018-03-03

## 2018-03-03 LAB
ANION GAP SERPL CALC-SCNC: 13 MMOL/L — SIGNIFICANT CHANGE UP (ref 5–17)
BUN SERPL-MCNC: 17 MG/DL — SIGNIFICANT CHANGE UP (ref 7–23)
CALCIUM SERPL-MCNC: 8 MG/DL — LOW (ref 8.4–10.5)
CALCIUM SERPL-MCNC: 8.3 MG/DL — LOW (ref 8.4–10.5)
CHLORIDE SERPL-SCNC: 93 MMOL/L — LOW (ref 96–108)
CO2 SERPL-SCNC: 29 MMOL/L — SIGNIFICANT CHANGE UP (ref 22–31)
CREAT SERPL-MCNC: 3.17 MG/DL — HIGH (ref 0.5–1.3)
GLUCOSE BLDC GLUCOMTR-MCNC: 101 MG/DL — HIGH (ref 70–99)
GLUCOSE BLDC GLUCOMTR-MCNC: 107 MG/DL — HIGH (ref 70–99)
GLUCOSE BLDC GLUCOMTR-MCNC: 118 MG/DL — HIGH (ref 70–99)
GLUCOSE BLDC GLUCOMTR-MCNC: 177 MG/DL — HIGH (ref 70–99)
GLUCOSE BLDC GLUCOMTR-MCNC: 226 MG/DL — HIGH (ref 70–99)
GLUCOSE BLDC GLUCOMTR-MCNC: 296 MG/DL — HIGH (ref 70–99)
GLUCOSE SERPL-MCNC: 207 MG/DL — HIGH (ref 70–99)
HCT VFR BLD CALC: 30.3 % — LOW (ref 34.5–45)
HGB BLD-MCNC: 9.6 G/DL — LOW (ref 11.5–15.5)
MAGNESIUM SERPL-MCNC: 1.9 MG/DL — SIGNIFICANT CHANGE UP (ref 1.6–2.6)
MCHC RBC-ENTMCNC: 31.7 GM/DL — LOW (ref 32–36)
MCHC RBC-ENTMCNC: 32.3 PG — SIGNIFICANT CHANGE UP (ref 27–34)
MCV RBC AUTO: 102 FL — HIGH (ref 80–100)
PHOSPHATE SERPL-MCNC: 3.6 MG/DL — SIGNIFICANT CHANGE UP (ref 2.5–4.5)
PLATELET # BLD AUTO: 259 K/UL — SIGNIFICANT CHANGE UP (ref 150–400)
POTASSIUM SERPL-MCNC: 4 MMOL/L — SIGNIFICANT CHANGE UP (ref 3.5–5.3)
POTASSIUM SERPL-SCNC: 4 MMOL/L — SIGNIFICANT CHANGE UP (ref 3.5–5.3)
PTH-INTACT FLD-MCNC: 134 PG/ML — HIGH (ref 15–65)
RBC # BLD: 2.97 M/UL — LOW (ref 3.8–5.2)
RBC # FLD: 14.2 % — SIGNIFICANT CHANGE UP (ref 10.3–14.5)
SODIUM SERPL-SCNC: 135 MMOL/L — SIGNIFICANT CHANGE UP (ref 135–145)
WBC # BLD: 4 K/UL — SIGNIFICANT CHANGE UP (ref 3.8–10.5)
WBC # FLD AUTO: 4 K/UL — SIGNIFICANT CHANGE UP (ref 3.8–10.5)

## 2018-03-03 RX ORDER — INSULIN LISPRO 100/ML
4 VIAL (ML) SUBCUTANEOUS
Qty: 0 | Refills: 0 | Status: DISCONTINUED | OUTPATIENT
Start: 2018-03-03 | End: 2018-03-04

## 2018-03-03 RX ORDER — LISINOPRIL 2.5 MG/1
1 TABLET ORAL
Qty: 0 | Refills: 0 | COMMUNITY

## 2018-03-03 RX ORDER — INSULIN GLARGINE 100 [IU]/ML
9 INJECTION, SOLUTION SUBCUTANEOUS AT BEDTIME
Qty: 0 | Refills: 0 | Status: DISCONTINUED | OUTPATIENT
Start: 2018-03-03 | End: 2018-03-04

## 2018-03-03 RX ORDER — ERYTHROPOIETIN 10000 [IU]/ML
10000 INJECTION, SOLUTION INTRAVENOUS; SUBCUTANEOUS ONCE
Qty: 0 | Refills: 0 | Status: COMPLETED | OUTPATIENT
Start: 2018-03-03 | End: 2018-03-03

## 2018-03-03 RX ADMIN — ATORVASTATIN CALCIUM 20 MILLIGRAM(S): 80 TABLET, FILM COATED ORAL at 01:06

## 2018-03-03 RX ADMIN — Medication 81 MILLIGRAM(S): at 12:18

## 2018-03-03 RX ADMIN — INSULIN GLARGINE 9 UNIT(S): 100 INJECTION, SOLUTION SUBCUTANEOUS at 21:43

## 2018-03-03 RX ADMIN — Medication 25 MILLIGRAM(S): at 21:22

## 2018-03-03 RX ADMIN — CLOPIDOGREL BISULFATE 75 MILLIGRAM(S): 75 TABLET, FILM COATED ORAL at 21:22

## 2018-03-03 RX ADMIN — ERYTHROPOIETIN 10000 UNIT(S): 10000 INJECTION, SOLUTION INTRAVENOUS; SUBCUTANEOUS at 19:33

## 2018-03-03 RX ADMIN — Medication 50 MILLIGRAM(S): at 01:06

## 2018-03-03 RX ADMIN — Medication 4 UNIT(S): at 17:50

## 2018-03-03 RX ADMIN — Medication 3 UNIT(S): at 08:09

## 2018-03-03 RX ADMIN — CINACALCET 60 MILLIGRAM(S): 30 TABLET, FILM COATED ORAL at 01:05

## 2018-03-03 RX ADMIN — OXYCODONE AND ACETAMINOPHEN 1 TABLET(S): 5; 325 TABLET ORAL at 00:00

## 2018-03-03 RX ADMIN — Medication 3: at 12:17

## 2018-03-03 RX ADMIN — CLOPIDOGREL BISULFATE 75 MILLIGRAM(S): 75 TABLET, FILM COATED ORAL at 01:05

## 2018-03-03 RX ADMIN — Medication 2: at 08:09

## 2018-03-03 RX ADMIN — Medication 50 MILLIGRAM(S): at 16:36

## 2018-03-03 RX ADMIN — Medication 25 MILLIGRAM(S): at 05:06

## 2018-03-03 RX ADMIN — Medication 3 UNIT(S): at 12:18

## 2018-03-03 RX ADMIN — INSULIN GLARGINE 8 UNIT(S): 100 INJECTION, SOLUTION SUBCUTANEOUS at 01:06

## 2018-03-03 RX ADMIN — Medication 50 MILLIGRAM(S): at 23:26

## 2018-03-03 RX ADMIN — CINACALCET 60 MILLIGRAM(S): 30 TABLET, FILM COATED ORAL at 12:19

## 2018-03-03 RX ADMIN — Medication 50 MILLIGRAM(S): at 08:09

## 2018-03-03 RX ADMIN — ATORVASTATIN CALCIUM 20 MILLIGRAM(S): 80 TABLET, FILM COATED ORAL at 21:22

## 2018-03-03 NOTE — PROVIDER CONTACT NOTE (OTHER) - ASSESSMENT
AxOx4
VSS.  Patient denies nausea.  Patient alert and oriented.  Patient asymptomatic.
Pt is A&Ox3. Pt walks around unit and in room. Pt had dialysis, bp was high before and after dialysis. Pt has no complaints.

## 2018-03-03 NOTE — PROGRESS NOTE ADULT - SUBJECTIVE AND OBJECTIVE BOX
[facility]   [phone]  DIALYSIS NOTE  Chief Complaint: ESRD/Ongoing hemodialysis requirement. seen on hd    24 hour events/subjective:    seen earlier.   no c/o        ALLERGIES & MEDICATIONS  --------------------------------------------------------------------------------  Allergies    No Known Allergies    Intolerances      Standing Inpatient Medications  aspirin enteric coated 81 milliGRAM(s) Oral daily  atorvastatin 20 milliGRAM(s) Oral at bedtime  cinacalcet 60 milliGRAM(s) Oral daily  clopidogrel Tablet 75 milliGRAM(s) Oral at bedtime  dextrose 5%. 1000 milliLiter(s) IV Continuous <Continuous>  dextrose 50% Injectable 12.5 Gram(s) IV Push once  dextrose 50% Injectable 25 Gram(s) IV Push once  dextrose 50% Injectable 25 Gram(s) IV Push once  heparin  Injectable 5000 Unit(s) SubCutaneous every 8 hours  hydrALAZINE 50 milliGRAM(s) Oral every 8 hours  influenza   Vaccine 0.5 milliLiter(s) IntraMuscular once  insulin glargine Injectable (LANTUS) 9 Unit(s) SubCutaneous at bedtime  insulin Infusion 1 Unit(s)/Hr IV Continuous <Continuous>  insulin lispro (HumaLOG) corrective regimen sliding scale   SubCutaneous three times a day before meals  insulin lispro (HumaLOG) corrective regimen sliding scale   SubCutaneous at bedtime  insulin lispro Injectable (HumaLOG) 4 Unit(s) SubCutaneous three times a day before meals  metoprolol     tartrate 25 milliGRAM(s) Oral two times a day    PRN Inpatient Medications  dextrose Gel 1 Dose(s) Oral once PRN  glucagon  Injectable 1 milliGRAM(s) IntraMuscular once PRN      REVIEW OF SYSTEMS  --------------------------------------------------------------------------------    no fever or chill  no cp or palp   no sob or cough   no N/V/D/ no abd pain   ext no edema       VITALS/PHYSICAL EXAM  --------------------------------------------------------------------------------  T(C): 36.7 (03-03-18 @ 16:55), Max: 37.2 (03-03-18 @ 00:30)  HR: 66 (03-03-18 @ 16:55) (64 - 74)  BP: 181/81 (03-03-18 @ 16:55) (134/66 - 181/81)  RR: 18 (03-03-18 @ 16:55) (17 - 18)  SpO2: 100% (03-03-18 @ 16:55) (96% - 100%)  Wt(kg): --        03-02-18 @ 07:01  -  03-03-18 @ 07:00  --------------------------------------------------------  IN: 120 mL / OUT: 2000 mL / NET: -1880 mL    03-03-18 @ 07:01  -  03-03-18 @ 19:17  --------------------------------------------------------  IN: 540 mL / OUT: 0 mL / NET: 540 mL      Physical Exam:  		    	Gen: alert oriented place person and date   	Pulm: Decreased breath sounds b/l bases no rales or ronchi  	CV: RRR, S1/S2  	Abd: +BS, soft, nontender/nondistended  	Extremity: No cyanosis, no edema no clubbing  		    LABS/STUDIES  --------------------------------------------------------------------------------              9.6    4.00  >-----------<  259      [03-03-18 @ 07:43]              30.3     135  |  93  |  17  ----------------------------<  207      [03-03-18 @ 06:20]  4.0   |  29  |  3.17        Ca     8.0     [03-03-18 @ 06:20]      Mg     1.9     [03-03-18 @ 06:20]      Phos  3.6     [03-03-18 @ 06:20]      PT/INR: PT 11.8 , INR 1.09       [03-01-18 @ 23:15]  PTT: 27.2       [03-01-18 @ 23:15]      imp/suggest: ESRD      Hemodialysis Prescription:  	Access: avf  	Dialyzer: revaclear 300   	Blood Flow (mL/Min): 400  	Dialysate Flow (mL/Min): 600  	Target UF (Liters): 1 liter   	Treatment Time: 3.5 hr   	Potassium: 2k   	Calcium: 2.5  	  YOLANDA  epogen 26365 units ivp  Vitamin D     continue with hd

## 2018-03-03 NOTE — DISCHARGE NOTE ADULT - HOSPITAL COURSE
62 f with  Diabetes type 1, admitted with DKA- Insulin, Endocrine follow  ESRD- continue HD,   CAD- s/p stents- stable.  HTn control  PVD stable  Vaginal bleeding-Gyn evaluation noted. No further work up in hospital . Follow as OTP 62 f with  Diabetes type 1, admitted with DKA- Insulin, Endocrine follow, resolved; Pt to restart insulin pump on discharge  ESRD- continue HD,   CAD- s/p stents- stable.  HTn control  PVD stable  Vaginal bleeding-Gyn evaluation noted. No further work up in hospital . Follow as OTP

## 2018-03-03 NOTE — PROGRESS NOTE ADULT - PROBLEM SELECTOR PLAN 2
- recommend decrease Lantus to 8 units qhs, start Humalog 3 units qac  - low correction sliding scale  - consistent carb diet   - plan to transition to insulin pump 3/3
- recommend increase Lantus to 9 units qhs, increase Humalog to 4 units qac  - low correction sliding scale  - consistent carb diet   - plan to transition to insulin pump prior to discharge  - from endocrine standpoint, patient is cleared for discharge. She will need HD set up prior to discharge

## 2018-03-03 NOTE — DISCHARGE NOTE ADULT - CARE PLAN
Principal Discharge DX:	Diabetic ketoacidosis without coma associated with type 1 diabetes mellitus  Goal:	resolution of symptoms  Assessment and plan of treatment:	Resume insulin pump at 11pm tonight 3/3  HgA1C this admission -7.1  Make sure you get your HgA1c checked every three months.  If you take oral diabetes medications, check your blood glucose two times a day.  If you take insulin, check your blood glucose before meals and at bedtime.  It's important not to skip any meals.  Keep a log of your blood glucose results and always take it with you to your doctor appointments.  Keep a list of your current medications including injectables and over the counter medications and bring this medication list with you to all your doctor appointments.  If you have not seen your ophthalmologist this year call for appointment.  Check your feet daily for redness, sores, or openings. Do not self treat. If no improvement in two days call your primary care physician for an appointment.  Low blood sugar (hypoglycemia) is a blood sugar below 70mg/dl. Check your blood sugar if you feel signs/symptoms of hypoglycemia. If your blood sugar is below 70 take 15 grams of carbohydrates (ex 4 oz of apple juice, 3-4 glucose tablets, or 4-6 oz of regular soda) wait 15 minutes and repeat blood sugar to make sure it comes up above 70.  If your blood sugar is above 70 and you are due for a meal, have a meal.  If you are not due for a meal have a snack.  This snack helps keeps your blood sugar at a safe range.  Secondary Diagnosis:	Vaginal bleeding  Assessment and plan of treatment:	Follow up outpatient with [facility] at [address] [place] [address], [address] ([phone]) after discharge to establish GYN care and for routine Health Maintenance  Secondary Diagnosis:	ESRD (end stage renal disease)  Assessment and plan of treatment:	Continue hemodialysis schedule  Avoid taking (NSAIDs) - (ex: Ibuprofen, Advil, Celebrex, Naprosyn)  Avoid taking any nephrotoxic agents (can harm kidneys) - Intravenous contrast for diagnostic testing, combination cold medications.  Have all medications adjusted for your renal function by your Health Care Provider.  Blood pressure control is important.  Take all medication as prescribed. Principal Discharge DX:	Diabetic ketoacidosis without coma associated with type 1 diabetes mellitus  Goal:	resolution of symptoms  Assessment and plan of treatment:	HgA1C this admission -7.1  Make sure you get your HgA1c checked every three months.  If you take oral diabetes medications, check your blood glucose two times a day.  If you take insulin, check your blood glucose before meals and at bedtime.  It's important not to skip any meals.  Keep a log of your blood glucose results and always take it with you to your doctor appointments.  Keep a list of your current medications including injectables and over the counter medications and bring this medication list with you to all your doctor appointments.  If you have not seen your ophthalmologist this year call for appointment.  Check your feet daily for redness, sores, or openings. Do not self treat. If no improvement in two days call your primary care physician for an appointment.  Low blood sugar (hypoglycemia) is a blood sugar below 70mg/dl. Check your blood sugar if you feel signs/symptoms of hypoglycemia. If your blood sugar is below 70 take 15 grams of carbohydrates (ex 4 oz of apple juice, 3-4 glucose tablets, or 4-6 oz of regular soda) wait 15 minutes and repeat blood sugar to make sure it comes up above 70.  If your blood sugar is above 70 and you are due for a meal, have a meal.  If you are not due for a meal have a snack.  This snack helps keeps your blood sugar at a safe range.  Secondary Diagnosis:	Vaginal bleeding  Assessment and plan of treatment:	Follow up outpatient with [facility] at [address] [place] [address], [address] ([phone]) after discharge to establish GYN care and for routine Health Maintenance  Secondary Diagnosis:	ESRD (end stage renal disease)  Assessment and plan of treatment:	Continue hemodialysis schedule  Avoid taking (NSAIDs) - (ex: Ibuprofen, Advil, Celebrex, Naprosyn)  Avoid taking any nephrotoxic agents (can harm kidneys) - Intravenous contrast for diagnostic testing, combination cold medications.  Have all medications adjusted for your renal function by your Health Care Provider.  Blood pressure control is important.  Take all medication as prescribed.

## 2018-03-03 NOTE — PROVIDER CONTACT NOTE (OTHER) - BACKGROUND
Patient admitted with DKA, was on Insulin gtt.  Insulin pump discontinued as per endocrine due to non compliance.
dialysis
Pt is a 62 yr old female with PMH of CAD, cellulitis, DKA type 1, peripheral vascular disease, ACS, gout, TIA, CVA, MI, ESRD,

## 2018-03-03 NOTE — PROGRESS NOTE ADULT - ASSESSMENT
62 f with  Diabetes type 1, DKA- Insulin, Endocrine follow  ESRD- continue HD,   CAD- s/p stents- stable.  HTn control  PVD stable  Vaginal bleeding-Gyn evaluation noted. No further work up in hospital . Follow as OTP  DCP home when cleared by Endocrine service.  Pierce Viera MD pager [phone]

## 2018-03-03 NOTE — DISCHARGE NOTE ADULT - PATIENT PORTAL LINK FT
You can access the [facility] Patient Portal, offered by [facility], by registering with the following website: [email or web address]

## 2018-03-03 NOTE — PROGRESS NOTE ADULT - SUBJECTIVE AND OBJECTIVE BOX
Chief Complaint: f/u DM1    History:  Patient states that she is eating well. No abdominal pain, nausea, vomiting, diarrhea.     MEDICATIONS  (STANDING):  aspirin enteric coated 81 milliGRAM(s) Oral daily  atorvastatin 20 milliGRAM(s) Oral at bedtime  cinacalcet 60 milliGRAM(s) Oral daily  clopidogrel Tablet 75 milliGRAM(s) Oral at bedtime  dextrose 5%. 1000 milliLiter(s) (50 mL/Hr) IV Continuous <Continuous>  dextrose 50% Injectable 12.5 Gram(s) IV Push once  dextrose 50% Injectable 25 Gram(s) IV Push once  dextrose 50% Injectable 25 Gram(s) IV Push once  heparin  Injectable 5000 Unit(s) SubCutaneous every 8 hours  hydrALAZINE 50 milliGRAM(s) Oral every 8 hours  influenza   Vaccine 0.5 milliLiter(s) IntraMuscular once  insulin glargine Injectable (LANTUS) 8 Unit(s) SubCutaneous at bedtime  insulin Infusion 1 Unit(s)/Hr (1 mL/Hr) IV Continuous <Continuous>  insulin lispro (HumaLOG) corrective regimen sliding scale   SubCutaneous three times a day before meals  insulin lispro (HumaLOG) corrective regimen sliding scale   SubCutaneous at bedtime  insulin lispro Injectable (HumaLOG) 3 Unit(s) SubCutaneous three times a day before meals  metoprolol     tartrate 25 milliGRAM(s) Oral two times a day    MEDICATIONS  (PRN):  dextrose Gel 1 Dose(s) Oral once PRN Blood Glucose LESS THAN 70 milliGRAM(s)/deciliter  glucagon  Injectable 1 milliGRAM(s) IntraMuscular once PRN Glucose LESS THAN 70 milligrams/deciliter      Allergies  No Known Allergies    Review of Systems:  Constitutional: No fever  Cardiovascular: No chest pain, palpitations  Respiratory: No SOB, no cough  GI: No nausea, vomiting, abdominal pain    ALL OTHER SYSTEMS REVIEWED AND NEGATIVE    PHYSICAL EXAM:  VITALS: T(C): 36.9 (03-03-18 @ 11:39)  T(F): 98.4 (03-03-18 @ 11:39), Max: 98.9 (03-03-18 @ 00:30)  HR: 64 (03-03-18 @ 11:39) (64 - 74)  BP: 150/90 (03-03-18 @ 11:39) (122/59 - 152/82)  RR:  (17 - 21)  SpO2:  (96% - 100%)  Wt(kg): --  GENERAL: NAD, well-developed  RESPIRATORY: Clear to auscultation bilaterally; No rales, rhonchi, wheezing, or rubs  CARDIOVASCULAR: Regular rate and rhythm; No murmurs  GI: Soft, nontender, non distended  PSYCH: Alert and oriented x 3, reactive affect    POCT Blood Glucose.: 296 mg/dL (03-03-18 @ 12:15) H 3,3  POCT Blood Glucose.: 226 mg/dL (03-03-18 @ 08:08) H 3, 2  POCT Blood Glucose.: 177 mg/dL (03-03-18 @ 01:00) L8   POCT Blood Glucose.: 181 mg/dL (03-02-18 @ 16:50) H 3,1  POCT Blood Glucose.: 254 mg/dL (03-02-18 @ 14:13)  POCT Blood Glucose.: 300 mg/dL (03-02-18 @ 12:45)  POCT Blood Glucose.: 104 mg/dL (03-02-18 @ 07:58)  POCT Blood Glucose.: 92 mg/dL (03-02-18 @ 06:17)  POCT Blood Glucose.: 112 mg/dL (03-02-18 @ 03:21)  POCT Blood Glucose.: 89 mg/dL (03-02-18 @ 02:07)  POCT Blood Glucose.: 101 mg/dL (03-02-18 @ 01:06)  POCT Blood Glucose.: 178 mg/dL (03-02-18 @ 01:05)  POCT Blood Glucose.: 119 mg/dL (03-01-18 @ 23:57)  POCT Blood Glucose.: 123 mg/dL (03-01-18 @ 23:03)  POCT Blood Glucose.: 145 mg/dL (03-01-18 @ 21:59)  POCT Blood Glucose.: 195 mg/dL (03-01-18 @ 21:11)  POCT Blood Glucose.: 240 mg/dL (03-01-18 @ 21:10)  POCT Blood Glucose.: 176 mg/dL (03-01-18 @ 19:41)  POCT Blood Glucose.: 136 mg/dL (03-01-18 @ 18:10)  POCT Blood Glucose.: 139 mg/dL (03-01-18 @ 17:35)  POCT Blood Glucose.: 131 mg/dL (03-01-18 @ 15:46)  POCT Blood Glucose.: 181 mg/dL (03-01-18 @ 14:16)  POCT Blood Glucose.: 217 mg/dL (03-01-18 @ 12:35)  POCT Blood Glucose.: 200 mg/dL (03-01-18 @ 11:31)  POCT Blood Glucose.: 229 mg/dL (03-01-18 @ 10:47)  POCT Blood Glucose.: 202 mg/dL (03-01-18 @ 09:30)  POCT Blood Glucose.: 220 mg/dL (03-01-18 @ 08:11)  POCT Blood Glucose.: 162 mg/dL (03-01-18 @ 06:52)  POCT Blood Glucose.: 223 mg/dL (03-01-18 @ 06:00)  POCT Blood Glucose.: 265 mg/dL (03-01-18 @ 04:56)  POCT Blood Glucose.: 191 mg/dL (03-01-18 @ 04:00)  POCT Blood Glucose.: 188 mg/dL (03-01-18 @ 02:55)  POCT Blood Glucose.: 114 mg/dL (03-01-18 @ 02:01)  POCT Blood Glucose.: 101 mg/dL (03-01-18 @ 01:58)  POCT Blood Glucose.: 163 mg/dL (03-01-18 @ 01:01)  POCT Blood Glucose.: 211 mg/dL (02-28-18 @ 23:59)  POCT Blood Glucose.: 189 mg/dL (02-28-18 @ 23:53)  POCT Blood Glucose.: 372 mg/dL (02-28-18 @ 22:49)  POCT Blood Glucose.: >600 mg/dL (02-28-18 @ 21:25)  POCT Blood Glucose.: >600 mg/dL (02-28-18 @ 20:11)  POCT Blood Glucose.: >600 mg/dL (02-28-18 @ 18:47)  POCT Blood Glucose.: >600 mg/dL (02-28-18 @ 17:27)      03-03    135  |  93<L>  |  17  ----------------------------<  207<H>  4.0   |  29  |  3.17<H>    EGFR if : 18<L>  EGFR if non : 15<L>    Ca    8.0<L>      03-03  Mg     1.9     03-03  Phos  3.6     03-03    TPro  7.0  /  Alb  4.1  /  TBili  0.7  /  DBili  x   /  AST  46<H>  /  ALT  24  /  AlkPhos  147<H>  02-28      Hemoglobin A1C, Whole Blood: 7.1 % <H> [4.0 - 5.6] (03-01-18 @ 01:59) Chief Complaint: f/u DM1    History:  Patient states that she is tolerating po. No abdominal pain, nausea, vomiting, diarrhea.     MEDICATIONS  (STANDING):  aspirin enteric coated 81 milliGRAM(s) Oral daily  atorvastatin 20 milliGRAM(s) Oral at bedtime  cinacalcet 60 milliGRAM(s) Oral daily  clopidogrel Tablet 75 milliGRAM(s) Oral at bedtime  dextrose 5%. 1000 milliLiter(s) (50 mL/Hr) IV Continuous <Continuous>  dextrose 50% Injectable 12.5 Gram(s) IV Push once  dextrose 50% Injectable 25 Gram(s) IV Push once  dextrose 50% Injectable 25 Gram(s) IV Push once  heparin  Injectable 5000 Unit(s) SubCutaneous every 8 hours  hydrALAZINE 50 milliGRAM(s) Oral every 8 hours  influenza   Vaccine 0.5 milliLiter(s) IntraMuscular once  insulin glargine Injectable (LANTUS) 8 Unit(s) SubCutaneous at bedtime  insulin Infusion 1 Unit(s)/Hr (1 mL/Hr) IV Continuous <Continuous>  insulin lispro (HumaLOG) corrective regimen sliding scale   SubCutaneous three times a day before meals  insulin lispro (HumaLOG) corrective regimen sliding scale   SubCutaneous at bedtime  insulin lispro Injectable (HumaLOG) 3 Unit(s) SubCutaneous three times a day before meals  metoprolol     tartrate 25 milliGRAM(s) Oral two times a day    MEDICATIONS  (PRN):  dextrose Gel 1 Dose(s) Oral once PRN Blood Glucose LESS THAN 70 milliGRAM(s)/deciliter  glucagon  Injectable 1 milliGRAM(s) IntraMuscular once PRN Glucose LESS THAN 70 milligrams/deciliter      Allergies  No Known Allergies    Review of Systems:  Constitutional: No fever  Cardiovascular: No chest pain, palpitations  Respiratory: No SOB, no cough  GI: No nausea, vomiting, abdominal pain    ALL OTHER SYSTEMS REVIEWED AND NEGATIVE    PHYSICAL EXAM:  VITALS: T(C): 36.9 (03-03-18 @ 11:39)  T(F): 98.4 (03-03-18 @ 11:39), Max: 98.9 (03-03-18 @ 00:30)  HR: 64 (03-03-18 @ 11:39) (64 - 74)  BP: 150/90 (03-03-18 @ 11:39) (122/59 - 152/82)  RR:  (17 - 21)  SpO2:  (96% - 100%)  Wt(kg): --  GENERAL: NAD, well-developed  RESPIRATORY: Clear to auscultation bilaterally; No rales, rhonchi, wheezing, or rubs  CARDIOVASCULAR: Regular rate and rhythm; No murmurs  GI: Soft, nontender, non distended  PSYCH: Alert and oriented x 3, reactive affect    POCT Blood Glucose.: 296 mg/dL (03-03-18 @ 12:15) H 3,3  POCT Blood Glucose.: 226 mg/dL (03-03-18 @ 08:08) H 3, 2  POCT Blood Glucose.: 177 mg/dL (03-03-18 @ 01:00) L8   POCT Blood Glucose.: 181 mg/dL (03-02-18 @ 16:50) H 3,1  POCT Blood Glucose.: 254 mg/dL (03-02-18 @ 14:13)  POCT Blood Glucose.: 300 mg/dL (03-02-18 @ 12:45)  POCT Blood Glucose.: 104 mg/dL (03-02-18 @ 07:58)  POCT Blood Glucose.: 92 mg/dL (03-02-18 @ 06:17)  POCT Blood Glucose.: 112 mg/dL (03-02-18 @ 03:21)  POCT Blood Glucose.: 89 mg/dL (03-02-18 @ 02:07)  POCT Blood Glucose.: 101 mg/dL (03-02-18 @ 01:06)  POCT Blood Glucose.: 178 mg/dL (03-02-18 @ 01:05)  POCT Blood Glucose.: 119 mg/dL (03-01-18 @ 23:57)  POCT Blood Glucose.: 123 mg/dL (03-01-18 @ 23:03)  POCT Blood Glucose.: 145 mg/dL (03-01-18 @ 21:59)  POCT Blood Glucose.: 195 mg/dL (03-01-18 @ 21:11)  POCT Blood Glucose.: 240 mg/dL (03-01-18 @ 21:10)  POCT Blood Glucose.: 176 mg/dL (03-01-18 @ 19:41)  POCT Blood Glucose.: 136 mg/dL (03-01-18 @ 18:10)  POCT Blood Glucose.: 139 mg/dL (03-01-18 @ 17:35)  POCT Blood Glucose.: 131 mg/dL (03-01-18 @ 15:46)  POCT Blood Glucose.: 181 mg/dL (03-01-18 @ 14:16)  POCT Blood Glucose.: 217 mg/dL (03-01-18 @ 12:35)  POCT Blood Glucose.: 200 mg/dL (03-01-18 @ 11:31)  POCT Blood Glucose.: 229 mg/dL (03-01-18 @ 10:47)  POCT Blood Glucose.: 202 mg/dL (03-01-18 @ 09:30)  POCT Blood Glucose.: 220 mg/dL (03-01-18 @ 08:11)  POCT Blood Glucose.: 162 mg/dL (03-01-18 @ 06:52)  POCT Blood Glucose.: 223 mg/dL (03-01-18 @ 06:00)  POCT Blood Glucose.: 265 mg/dL (03-01-18 @ 04:56)  POCT Blood Glucose.: 191 mg/dL (03-01-18 @ 04:00)  POCT Blood Glucose.: 188 mg/dL (03-01-18 @ 02:55)  POCT Blood Glucose.: 114 mg/dL (03-01-18 @ 02:01)  POCT Blood Glucose.: 101 mg/dL (03-01-18 @ 01:58)  POCT Blood Glucose.: 163 mg/dL (03-01-18 @ 01:01)  POCT Blood Glucose.: 211 mg/dL (02-28-18 @ 23:59)  POCT Blood Glucose.: 189 mg/dL (02-28-18 @ 23:53)  POCT Blood Glucose.: 372 mg/dL (02-28-18 @ 22:49)  POCT Blood Glucose.: >600 mg/dL (02-28-18 @ 21:25)  POCT Blood Glucose.: >600 mg/dL (02-28-18 @ 20:11)  POCT Blood Glucose.: >600 mg/dL (02-28-18 @ 18:47)  POCT Blood Glucose.: >600 mg/dL (02-28-18 @ 17:27)      03-03    135  |  93<L>  |  17  ----------------------------<  207<H>  4.0   |  29  |  3.17<H>    EGFR if : 18<L>  EGFR if non : 15<L>    Ca    8.0<L>      03-03  Mg     1.9     03-03  Phos  3.6     03-03    TPro  7.0  /  Alb  4.1  /  TBili  0.7  /  DBili  x   /  AST  46<H>  /  ALT  24  /  AlkPhos  147<H>  02-28      Hemoglobin A1C, Whole Blood: 7.1 % <H> [4.0 - 5.6] (03-01-18 @ 01:59)

## 2018-03-03 NOTE — PROGRESS NOTE ADULT - PROBLEM SELECTOR PLAN 1
- DKA occurred in setting of incorrect insulin pump use (patient did not change site for 5 days)  - DKA now resolved  - recommend increase Lantus to 9 units qhs, increase Humalog to 4 units qac  - low correction sliding scale  - consistent carb diet   - plan to transition to insulin pump prior to discharge  - will follow - DKA occurred in setting of incorrect insulin pump use (patient did not change site for 5 days)  - DKA now resolved  - recommend increase Lantus to 9 units qhs, increase Humalog to 4 units qac  - low correction sliding scale  - consistent carb diet   - if patient is discharged this evening, she can place her insulin pump on at 11 pm when she is at home. Otherwise c/w basal bolus for now  - will follow

## 2018-03-03 NOTE — PROGRESS NOTE ADULT - SUBJECTIVE AND OBJECTIVE BOX
Patient is a 62y old  Female who presents with a chief complaint of nausea/elevated blood glucose (28 Feb 2018 19:08)      SUBJECTIVE / OVERNIGHT EVENTS: No new complaints.   Review of Systems  chest pain no  palpitations no  sob no  nausea no  headache no    MEDICATIONS  (STANDING):  aspirin enteric coated 81 milliGRAM(s) Oral daily  atorvastatin 20 milliGRAM(s) Oral at bedtime  cinacalcet 60 milliGRAM(s) Oral daily  clopidogrel Tablet 75 milliGRAM(s) Oral at bedtime  dextrose 5%. 1000 milliLiter(s) (50 mL/Hr) IV Continuous <Continuous>  dextrose 50% Injectable 12.5 Gram(s) IV Push once  dextrose 50% Injectable 25 Gram(s) IV Push once  dextrose 50% Injectable 25 Gram(s) IV Push once  heparin  Injectable 5000 Unit(s) SubCutaneous every 8 hours  hydrALAZINE 50 milliGRAM(s) Oral every 8 hours  influenza   Vaccine 0.5 milliLiter(s) IntraMuscular once  insulin glargine Injectable (LANTUS) 8 Unit(s) SubCutaneous at bedtime  insulin Infusion 1 Unit(s)/Hr (1 mL/Hr) IV Continuous <Continuous>  insulin lispro (HumaLOG) corrective regimen sliding scale   SubCutaneous three times a day before meals  insulin lispro (HumaLOG) corrective regimen sliding scale   SubCutaneous at bedtime  insulin lispro Injectable (HumaLOG) 3 Unit(s) SubCutaneous three times a day before meals  metoprolol     tartrate 25 milliGRAM(s) Oral two times a day    MEDICATIONS  (PRN):  dextrose Gel 1 Dose(s) Oral once PRN Blood Glucose LESS THAN 70 milliGRAM(s)/deciliter  glucagon  Injectable 1 milliGRAM(s) IntraMuscular once PRN Glucose LESS THAN 70 milligrams/deciliter          PHYSICAL EXAM:  GENERAL: NAD, well-developed  HEAD:  Atraumatic, Normocephalic  EYES: EOMI, PERRLA, conjunctiva and sclera clear  NECK: Supple, No JVD  CHEST/LUNG: Clear to auscultation bilaterally; No wheeze  HEART: Regular rate and rhythm; No murmurs, rubs, or gallops  ABDOMEN: Soft, Nontender, Nondistended; Bowel sounds present  EXTREMITIES:  2+ Peripheral Pulses, No clubbing, cyanosis, or edema  PSYCH: AAOx3  NEUROLOGY: non-focal  SKIN: No rashes or lesions    LABS:                        9.6    4.00  )-----------( 259      ( 03 Mar 2018 07:43 )             30.3     03-03    135  |  93<L>  |  17  ----------------------------<  207<H>  4.0   |  29  |  3.17<H>    Ca    8.0<L>      03 Mar 2018 06:20  Phos  3.6     03-03  Mg     1.9     03-03      PT/INR - ( 01 Mar 2018 23:15 )   PT: 11.8 sec;   INR: 1.09 ratio         PTT - ( 01 Mar 2018 23:15 )  PTT:27.2 sec          RADIOLOGY & ADDITIONAL TESTS:    Imaging Personally Reviewed:    Consultant(s) Notes Reviewed:      Care Discussed with Consultants/Other Providers:

## 2018-03-03 NOTE — PROGRESS NOTE ADULT - PROBLEM SELECTOR PROBLEM 2
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

## 2018-03-03 NOTE — DISCHARGE NOTE ADULT - PLAN OF CARE
resolution of symptoms Resume insulin pump at 11pm [facility] 3/3  HgA1C this admission -7.1  Make sure you get your HgA1c checked every three months.  If you take oral diabetes medications, check your blood glucose two times a day.  If you take insulin, check your blood glucose before meals and at bedtime.  It's important not to skip any meals.  Keep a log of your blood glucose results and always take it with you to your doctor appointments.  Keep a list of your current medications including injectables and over the counter medications and bring this medication list with you to all your doctor appointments.  If you have not seen your ophthalmologist this year call for appointment.  Check your feet daily for redness, sores, or openings. Do not self treat. If no improvement in two days call your primary care physician for an appointment.  Low blood sugar (hypoglycemia) is a blood sugar below 70mg/dl. Check your blood sugar if you feel signs/symptoms of hypoglycemia. If your blood sugar is below 70 take 15 grams of carbohydrates (ex 4 oz of apple juice, 3-4 glucose tablets, or 4-6 oz of regular soda) wait 15 minutes and repeat blood sugar to make sure it comes up above 70.  If your blood sugar is above 70 and you are due for a meal, have a meal.  If you are not due for a meal have a snack.  This snack helps keeps your blood sugar at a safe range. Follow up outpatient with [facility] at [address] [place] [address], [address] ([phone]) after discharge to establish GYN care and for routine Health Maintenance Continue hemodialysis schedule  Avoid taking (NSAIDs) - (ex: Ibuprofen, Advil, Celebrex, Naprosyn)  Avoid taking any nephrotoxic agents (can harm kidneys) - Intravenous contrast for diagnostic testing, combination cold medications.  Have all medications adjusted for your renal function by your Health Care Provider.  Blood pressure control is important.  Take all medication as prescribed. HgA1C this admission -7.1  Make sure you get your HgA1c checked every three months.  If you take oral diabetes medications, check your blood glucose two times a day.  If you take insulin, check your blood glucose before meals and at bedtime.  It's important not to skip any meals.  Keep a log of your blood glucose results and always take it with you to your doctor appointments.  Keep a list of your current medications including injectables and over the counter medications and bring this medication list with you to all your doctor appointments.  If you have not seen your ophthalmologist this year call for appointment.  Check your feet daily for redness, sores, or openings. Do not self treat. If no improvement in two days call your primary care physician for an appointment.  Low blood sugar (hypoglycemia) is a blood sugar below 70mg/dl. Check your blood sugar if you feel signs/symptoms of hypoglycemia. If your blood sugar is below 70 take 15 grams of carbohydrates (ex 4 oz of apple juice, 3-4 glucose tablets, or 4-6 oz of regular soda) wait 15 minutes and repeat blood sugar to make sure it comes up above 70.  If your blood sugar is above 70 and you are due for a meal, have a meal.  If you are not due for a meal have a snack.  This snack helps keeps your blood sugar at a safe range.

## 2018-03-03 NOTE — PROVIDER CONTACT NOTE (OTHER) - RECOMMENDATIONS
Follow sliding scale and administer standing order of Humalog.
explain risk and benefits
Pt can refuse heparin subcut

## 2018-03-03 NOTE — DISCHARGE NOTE ADULT - CARE PROVIDER_API CALL
Daisy Burger (DO), Nephrology  [address] [place] [address]  Phone: [phone]  Fax: [phone] Daisy Burger (DO), Nephrology  [address] [address]  Phone: [phone]  Fax: [phone]    Pina Ley (SUSAN), EndocrinologyMetabDiabetes  [address] [place] [address]  Phone: [phone]  Fax: [phone]

## 2018-03-03 NOTE — DISCHARGE NOTE ADULT - MEDICATION SUMMARY - MEDICATIONS TO TAKE
I will START or STAY ON the medications listed below when I get home from the hospital:    aspirin 81 mg oral delayed release tablet  -- 1 tab(s) by mouth once a day  -- Indication: For CAD (coronary artery disease)    insulin lispro 100 units/mL subcutaneous solution  -- as per  insulin pump setting  -- Indication: For Diabetes    atorvastatin 20 mg oral tablet  -- 1 tab(s) by mouth once a day (at bedtime)  -- Indication: For Hld    clopidogrel 75 mg oral tablet  -- 1 tab(s) by mouth once a day (at bedtime)  -- Indication: For CAD (coronary artery disease)    metoprolol tartrate 25 mg oral tablet  -- 1 tab(s) by mouth 2 times a day  -- Indication: For Htn    cinacalcet 60 mg oral tablet  -- 1 tab(s) by mouth once a day  -- Indication: For ESRD (end stage renal disease)    hydrALAZINE 50 mg oral tablet  -- 1 tab(s) by mouth every 8 hours  -- Indication: For Htn I will START or STAY ON the medications listed below when I get home from the hospital:    aspirin 81 mg oral delayed release tablet  -- 1 tab(s) by mouth once a day  -- Indication: For CAD (coronary artery disease)    insulin lispro 100 units/mL subcutaneous solution  -- as per  insulin pump setting(Insert pump at 8 PM tonight)  Humalog 3-4 units pre meals until then  -- Indication: For Diabetes    atorvastatin 20 mg oral tablet  -- 1 tab(s) by mouth once a day (at bedtime)  -- Indication: For Hld    clopidogrel 75 mg oral tablet  -- 1 tab(s) by mouth once a day (at bedtime)  -- Indication: For CAD (coronary artery disease)    metoprolol tartrate 25 mg oral tablet  -- 1 tab(s) by mouth 2 times a day  -- Indication: For Htn    cinacalcet 60 mg oral tablet  -- 1 tab(s) by mouth once a day  -- Indication: For ESRD (end stage renal disease)    hydrALAZINE 50 mg oral tablet  -- 1 tab(s) by mouth every 8 hours  -- Indication: For Htn

## 2018-03-04 VITALS
OXYGEN SATURATION: 100 % | RESPIRATION RATE: 18 BRPM | TEMPERATURE: 98 F | HEART RATE: 65 BPM | DIASTOLIC BLOOD PRESSURE: 78 MMHG | SYSTOLIC BLOOD PRESSURE: 164 MMHG

## 2018-03-04 LAB
GLUCOSE BLDC GLUCOMTR-MCNC: 312 MG/DL — HIGH (ref 70–99)
GLUCOSE BLDC GLUCOMTR-MCNC: 322 MG/DL — HIGH (ref 70–99)

## 2018-03-04 PROCEDURE — 82009 KETONE BODYS QUAL: CPT

## 2018-03-04 PROCEDURE — 82803 BLOOD GASES ANY COMBINATION: CPT

## 2018-03-04 PROCEDURE — 83036 HEMOGLOBIN GLYCOSYLATED A1C: CPT

## 2018-03-04 PROCEDURE — 82330 ASSAY OF CALCIUM: CPT

## 2018-03-04 PROCEDURE — 99285 EMERGENCY DEPT VISIT HI MDM: CPT | Mod: 25

## 2018-03-04 PROCEDURE — 88175 CYTOPATH C/V AUTO FLUID REDO: CPT

## 2018-03-04 PROCEDURE — 82310 ASSAY OF CALCIUM: CPT

## 2018-03-04 PROCEDURE — 76830 TRANSVAGINAL US NON-OB: CPT

## 2018-03-04 PROCEDURE — 93971 EXTREMITY STUDY: CPT

## 2018-03-04 PROCEDURE — 85610 PROTHROMBIN TIME: CPT

## 2018-03-04 PROCEDURE — 82010 KETONE BODYS QUAN: CPT

## 2018-03-04 PROCEDURE — 82550 ASSAY OF CK (CPK): CPT

## 2018-03-04 PROCEDURE — 82553 CREATINE MB FRACTION: CPT

## 2018-03-04 PROCEDURE — 84132 ASSAY OF SERUM POTASSIUM: CPT

## 2018-03-04 PROCEDURE — 80048 BASIC METABOLIC PNL TOTAL CA: CPT

## 2018-03-04 PROCEDURE — 86705 HEP B CORE ANTIBODY IGM: CPT

## 2018-03-04 PROCEDURE — 82435 ASSAY OF BLOOD CHLORIDE: CPT

## 2018-03-04 PROCEDURE — 86704 HEP B CORE ANTIBODY TOTAL: CPT

## 2018-03-04 PROCEDURE — 85014 HEMATOCRIT: CPT

## 2018-03-04 PROCEDURE — 80053 COMPREHEN METABOLIC PANEL: CPT

## 2018-03-04 PROCEDURE — 86803 HEPATITIS C AB TEST: CPT

## 2018-03-04 PROCEDURE — 96375 TX/PRO/DX INJ NEW DRUG ADDON: CPT

## 2018-03-04 PROCEDURE — 83690 ASSAY OF LIPASE: CPT

## 2018-03-04 PROCEDURE — 84295 ASSAY OF SERUM SODIUM: CPT

## 2018-03-04 PROCEDURE — 93005 ELECTROCARDIOGRAM TRACING: CPT

## 2018-03-04 PROCEDURE — 84100 ASSAY OF PHOSPHORUS: CPT

## 2018-03-04 PROCEDURE — 83605 ASSAY OF LACTIC ACID: CPT

## 2018-03-04 PROCEDURE — 83735 ASSAY OF MAGNESIUM: CPT

## 2018-03-04 PROCEDURE — 86706 HEP B SURFACE ANTIBODY: CPT

## 2018-03-04 PROCEDURE — 83880 ASSAY OF NATRIURETIC PEPTIDE: CPT

## 2018-03-04 PROCEDURE — 86709 HEPATITIS A IGM ANTIBODY: CPT

## 2018-03-04 PROCEDURE — 36000 PLACE NEEDLE IN VEIN: CPT | Mod: XU

## 2018-03-04 PROCEDURE — 71045 X-RAY EXAM CHEST 1 VIEW: CPT

## 2018-03-04 PROCEDURE — 87040 BLOOD CULTURE FOR BACTERIA: CPT

## 2018-03-04 PROCEDURE — 96376 TX/PRO/DX INJ SAME DRUG ADON: CPT

## 2018-03-04 PROCEDURE — 94640 AIRWAY INHALATION TREATMENT: CPT

## 2018-03-04 PROCEDURE — 99261: CPT

## 2018-03-04 PROCEDURE — 82962 GLUCOSE BLOOD TEST: CPT

## 2018-03-04 PROCEDURE — 83970 ASSAY OF PARATHORMONE: CPT

## 2018-03-04 PROCEDURE — 96374 THER/PROPH/DIAG INJ IV PUSH: CPT

## 2018-03-04 PROCEDURE — 84484 ASSAY OF TROPONIN QUANT: CPT

## 2018-03-04 PROCEDURE — 87340 HEPATITIS B SURFACE AG IA: CPT

## 2018-03-04 PROCEDURE — 85027 COMPLETE CBC AUTOMATED: CPT

## 2018-03-04 PROCEDURE — 82947 ASSAY GLUCOSE BLOOD QUANT: CPT

## 2018-03-04 PROCEDURE — 85730 THROMBOPLASTIN TIME PARTIAL: CPT

## 2018-03-04 RX ORDER — OXYCODONE AND ACETAMINOPHEN 5; 325 MG/1; MG/1
1 TABLET ORAL ONCE
Qty: 0 | Refills: 0 | Status: DISCONTINUED | OUTPATIENT
Start: 2018-03-04 | End: 2018-03-04

## 2018-03-04 RX ADMIN — CINACALCET 60 MILLIGRAM(S): 30 TABLET, FILM COATED ORAL at 11:37

## 2018-03-04 RX ADMIN — Medication 4: at 12:28

## 2018-03-04 RX ADMIN — Medication 4 UNIT(S): at 12:32

## 2018-03-04 RX ADMIN — Medication 81 MILLIGRAM(S): at 11:37

## 2018-03-04 RX ADMIN — Medication 4 UNIT(S): at 08:26

## 2018-03-04 RX ADMIN — OXYCODONE AND ACETAMINOPHEN 1 TABLET(S): 5; 325 TABLET ORAL at 01:29

## 2018-03-04 RX ADMIN — Medication 4: at 08:26

## 2018-03-04 RX ADMIN — Medication 50 MILLIGRAM(S): at 08:26

## 2018-03-04 RX ADMIN — OXYCODONE AND ACETAMINOPHEN 1 TABLET(S): 5; 325 TABLET ORAL at 02:10

## 2018-03-04 RX ADMIN — Medication 25 MILLIGRAM(S): at 05:35

## 2018-03-04 NOTE — PROGRESS NOTE ADULT - SUBJECTIVE AND OBJECTIVE BOX
Patient is a 62y old  Female who presents with a chief complaint of nausea/elevated blood glucose (03 Mar 2018 15:09)      SUBJECTIVE / OVERNIGHT EVENTS: Comfortable without new complaints.   Review of Systems  chest pain no  palpitations no  sob no  nausea no  headache no    MEDICATIONS  (STANDING):  aspirin enteric coated 81 milliGRAM(s) Oral daily  atorvastatin 20 milliGRAM(s) Oral at bedtime  cinacalcet 60 milliGRAM(s) Oral daily  clopidogrel Tablet 75 milliGRAM(s) Oral at bedtime  dextrose 5%. 1000 milliLiter(s) (50 mL/Hr) IV Continuous <Continuous>  dextrose 50% Injectable 12.5 Gram(s) IV Push once  dextrose 50% Injectable 25 Gram(s) IV Push once  dextrose 50% Injectable 25 Gram(s) IV Push once  heparin  Injectable 5000 Unit(s) SubCutaneous every 8 hours  hydrALAZINE 50 milliGRAM(s) Oral every 8 hours  influenza   Vaccine 0.5 milliLiter(s) IntraMuscular once  insulin glargine Injectable (LANTUS) 9 Unit(s) SubCutaneous at bedtime  insulin Infusion 1 Unit(s)/Hr (1 mL/Hr) IV Continuous <Continuous>  insulin lispro (HumaLOG) corrective regimen sliding scale   SubCutaneous three times a day before meals  insulin lispro (HumaLOG) corrective regimen sliding scale   SubCutaneous at bedtime  insulin lispro Injectable (HumaLOG) 4 Unit(s) SubCutaneous three times a day before meals  metoprolol     tartrate 25 milliGRAM(s) Oral two times a day    MEDICATIONS  (PRN):  dextrose Gel 1 Dose(s) Oral once PRN Blood Glucose LESS THAN 70 milliGRAM(s)/deciliter  glucagon  Injectable 1 milliGRAM(s) IntraMuscular once PRN Glucose LESS THAN 70 milligrams/deciliter          PHYSICAL EXAM:  GENERAL: NAD, well-developed  HEAD:  Atraumatic, Normocephalic  EYES: EOMI, PERRLA, conjunctiva and sclera clear  NECK: Supple, No JVD  CHEST/LUNG: Clear to auscultation bilaterally; No wheeze  HEART: Regular rate and rhythm; No murmurs, rubs, or gallops  ABDOMEN: Soft, Nontender, Nondistended; Bowel sounds present  EXTREMITIES:  2+ Peripheral Pulses, No clubbing, cyanosis, or edema  PSYCH: AAOx3  NEUROLOGY: non-focal  SKIN: No rashes or lesions    LABS:                        9.6    4.00  )-----------( 259      ( 03 Mar 2018 07:43 )             30.3     03-03    135  |  93<L>  |  17  ----------------------------<  207<H>  4.0   |  29  |  3.17<H>    Ca    8.0<L>      03 Mar 2018 06:20  Phos  3.6     03-03  Mg     1.9     03-03                RADIOLOGY & ADDITIONAL TESTS:    Imaging Personally Reviewed:    Consultant(s) Notes Reviewed:      Care Discussed with Consultants/Other Providers:

## 2018-03-06 LAB
CULTURE RESULTS: SIGNIFICANT CHANGE UP
SPECIMEN SOURCE: SIGNIFICANT CHANGE UP

## 2018-03-08 LAB — CYTOLOGY SPEC DOC CYTO: SIGNIFICANT CHANGE UP

## 2018-03-09 NOTE — CHART NOTE - NSCHARTNOTEFT_GEN_A_CORE
GYN TELEPHONE COMMUNICATION NOTE    Called Ms. Cui to convey results of her recent pap/hpv (negative/negative).  Reminded her she is welcome to f/u with our clinic and gave her phone number again.  Pt verbalized understanding and agreed with plan.    MARCOS Rai MD PGY1

## 2018-04-13 NOTE — CONSULT NOTE ADULT - ATTENDING COMMENTS
She is well known to me with a long history of PAD and ESRD on HD via a left arm AV fistula. She presented to the ER with left leg edema/swelling. A stenosis in the bypass was recently treated with an angioplasty. Her foot was warm, but not hot. A pulse was palpable in the bypass in her medial mid-thigh. Please do an arterial Doppler to assess the bypass.
17

## 2018-04-29 ENCOUNTER — INPATIENT (INPATIENT)
Facility: HOSPITAL | Age: 63
LOS: 5 days | Discharge: HOME | DRG: 981 | End: 2018-05-05
Attending: INTERNAL MEDICINE | Admitting: INTERNAL MEDICINE
Payer: MEDICARE

## 2018-04-29 VITALS
HEART RATE: 76 BPM | OXYGEN SATURATION: 100 % | SYSTOLIC BLOOD PRESSURE: 88 MMHG | DIASTOLIC BLOOD PRESSURE: 44 MMHG | TEMPERATURE: 98 F | RESPIRATION RATE: 26 BRPM

## 2018-04-29 DIAGNOSIS — Z98.89 OTHER SPECIFIED POSTPROCEDURAL STATES: Chronic | ICD-10-CM

## 2018-04-29 DIAGNOSIS — E13.10 OTHER SPECIFIED DIABETES MELLITUS WITH KETOACIDOSIS WITHOUT COMA: ICD-10-CM

## 2018-04-29 LAB
ACETONE SERPL-MCNC: ABNORMAL
ACETONE SERPL-MCNC: SIGNIFICANT CHANGE UP
ALBUMIN SERPL ELPH-MCNC: 4.2 G/DL — SIGNIFICANT CHANGE UP (ref 3.3–5)
ALBUMIN SERPL ELPH-MCNC: 4.2 G/DL — SIGNIFICANT CHANGE UP (ref 3.3–5)
ALBUMIN SERPL ELPH-MCNC: 4.4 G/DL — SIGNIFICANT CHANGE UP (ref 3.3–5)
ALP SERPL-CCNC: 113 U/L — SIGNIFICANT CHANGE UP (ref 40–120)
ALP SERPL-CCNC: 118 U/L — SIGNIFICANT CHANGE UP (ref 40–120)
ALP SERPL-CCNC: 121 U/L — HIGH (ref 40–120)
ALT FLD-CCNC: 26 U/L — SIGNIFICANT CHANGE UP (ref 10–45)
ALT FLD-CCNC: 33 U/L — SIGNIFICANT CHANGE UP (ref 10–45)
ALT FLD-CCNC: 39 U/L — SIGNIFICANT CHANGE UP (ref 10–45)
AMYLASE P1 CFR SERPL: 51 U/L — SIGNIFICANT CHANGE UP (ref 25–125)
ANION GAP SERPL CALC-SCNC: 30 MMOL/L — HIGH (ref 5–17)
ANION GAP SERPL CALC-SCNC: 43 MMOL/L — HIGH (ref 5–17)
ANION GAP SERPL CALC-SCNC: 47 MMOL/L — HIGH (ref 5–17)
ANISOCYTOSIS BLD QL: SLIGHT — SIGNIFICANT CHANGE UP
APAP SERPL-MCNC: <15 UG/ML — SIGNIFICANT CHANGE UP (ref 10–30)
APTT BLD: 25.9 SEC — LOW (ref 27.5–37.4)
APTT BLD: 28.1 SEC — SIGNIFICANT CHANGE UP (ref 27.5–37.4)
AST SERPL-CCNC: 124 U/L — HIGH (ref 10–40)
AST SERPL-CCNC: 211 U/L — HIGH (ref 10–40)
AST SERPL-CCNC: 82 U/L — HIGH (ref 10–40)
BASE EXCESS BLDV CALC-SCNC: -0.2 MMOL/L — SIGNIFICANT CHANGE UP (ref -2–2)
BASE EXCESS BLDV CALC-SCNC: -19 MMOL/L — LOW (ref -2–2)
BASOPHILS # BLD AUTO: 0.1 K/UL — SIGNIFICANT CHANGE UP (ref 0–0.2)
BASOPHILS NFR BLD AUTO: 0.4 % — SIGNIFICANT CHANGE UP (ref 0–2)
BILIRUB DIRECT SERPL-MCNC: 0.1 MG/DL — SIGNIFICANT CHANGE UP (ref 0–0.2)
BILIRUB INDIRECT FLD-MCNC: 0.3 MG/DL — SIGNIFICANT CHANGE UP (ref 0.2–1)
BILIRUB SERPL-MCNC: 0.4 MG/DL — SIGNIFICANT CHANGE UP (ref 0.2–1.2)
BILIRUB SERPL-MCNC: 0.4 MG/DL — SIGNIFICANT CHANGE UP (ref 0.2–1.2)
BILIRUB SERPL-MCNC: 0.6 MG/DL — SIGNIFICANT CHANGE UP (ref 0.2–1.2)
BLD GP AB SCN SERPL QL: NEGATIVE — SIGNIFICANT CHANGE UP
BUN SERPL-MCNC: 46 MG/DL — HIGH (ref 7–23)
BUN SERPL-MCNC: 49 MG/DL — HIGH (ref 7–23)
BUN SERPL-MCNC: 50 MG/DL — HIGH (ref 7–23)
CA-I SERPL-SCNC: 0.94 MMOL/L — LOW (ref 1.12–1.3)
CA-I SERPL-SCNC: 0.97 MMOL/L — LOW (ref 1.12–1.3)
CALCIUM SERPL-MCNC: 7.3 MG/DL — LOW (ref 8.4–10.5)
CALCIUM SERPL-MCNC: 7.9 MG/DL — LOW (ref 8.4–10.5)
CALCIUM SERPL-MCNC: 8.2 MG/DL — LOW (ref 8.4–10.5)
CHLORIDE BLDV-SCNC: 85 MMOL/L — LOW (ref 96–108)
CHLORIDE BLDV-SCNC: 89 MMOL/L — LOW (ref 96–108)
CHLORIDE SERPL-SCNC: 72 MMOL/L — LOW (ref 96–108)
CHLORIDE SERPL-SCNC: 78 MMOL/L — LOW (ref 96–108)
CHLORIDE SERPL-SCNC: 85 MMOL/L — LOW (ref 96–108)
CK MB BLD-MCNC: 6.8 % — HIGH (ref 0–3.5)
CK MB BLD-MCNC: 9.2 % — HIGH (ref 0–3.5)
CK MB CFR SERPL CALC: 138.5 NG/ML — HIGH (ref 0–3.8)
CK MB CFR SERPL CALC: 34.9 NG/ML — HIGH (ref 0–3.8)
CK SERPL-CCNC: 1506 U/L — HIGH (ref 25–170)
CK SERPL-CCNC: 512 U/L — HIGH (ref 25–170)
CO2 BLDV-SCNC: 11 MMOL/L — LOW (ref 22–30)
CO2 BLDV-SCNC: 26 MMOL/L — SIGNIFICANT CHANGE UP (ref 22–30)
CO2 SERPL-SCNC: 12 MMOL/L — LOW (ref 22–31)
CO2 SERPL-SCNC: 23 MMOL/L — SIGNIFICANT CHANGE UP (ref 22–31)
CO2 SERPL-SCNC: 8 MMOL/L — CRITICAL LOW (ref 22–31)
CREAT SERPL-MCNC: 5.89 MG/DL — HIGH (ref 0.5–1.3)
CREAT SERPL-MCNC: 6.01 MG/DL — HIGH (ref 0.5–1.3)
CREAT SERPL-MCNC: 6.31 MG/DL — HIGH (ref 0.5–1.3)
EOSINOPHIL # BLD AUTO: 0 K/UL — SIGNIFICANT CHANGE UP (ref 0–0.5)
EOSINOPHIL NFR BLD AUTO: 0.1 % — SIGNIFICANT CHANGE UP (ref 0–6)
GAS PNL BLDA: SIGNIFICANT CHANGE UP
GAS PNL BLDV: 124 MMOL/L — LOW (ref 136–145)
GAS PNL BLDV: 136 MMOL/L — SIGNIFICANT CHANGE UP (ref 136–145)
GAS PNL BLDV: SIGNIFICANT CHANGE UP
GLUCOSE BLDA-MCNC: 704 MG/DL — CRITICAL HIGH (ref 70–99)
GLUCOSE BLDA-MCNC: 842 MG/DL — CRITICAL HIGH (ref 70–99)
GLUCOSE BLDC GLUCOMTR-MCNC: 268 MG/DL — HIGH (ref 70–99)
GLUCOSE BLDC GLUCOMTR-MCNC: 340 MG/DL — HIGH (ref 70–99)
GLUCOSE BLDC GLUCOMTR-MCNC: 384 MG/DL — HIGH (ref 70–99)
GLUCOSE BLDC GLUCOMTR-MCNC: 446 MG/DL — HIGH (ref 70–99)
GLUCOSE BLDC GLUCOMTR-MCNC: 511 MG/DL — CRITICAL HIGH (ref 70–99)
GLUCOSE BLDC GLUCOMTR-MCNC: >600 MG/DL — CRITICAL HIGH (ref 70–99)
GLUCOSE BLDV-MCNC: 1035 MG/DL — CRITICAL HIGH (ref 70–99)
GLUCOSE BLDV-MCNC: 465 MG/DL — HIGH (ref 70–99)
GLUCOSE SERPL-MCNC: 1057 MG/DL — CRITICAL HIGH (ref 70–99)
GLUCOSE SERPL-MCNC: 1302 MG/DL — CRITICAL HIGH (ref 70–99)
GLUCOSE SERPL-MCNC: 495 MG/DL — CRITICAL HIGH (ref 70–99)
HCO3 BLDV-SCNC: 10 MMOL/L — LOW (ref 21–29)
HCO3 BLDV-SCNC: 24 MMOL/L — SIGNIFICANT CHANGE UP (ref 21–29)
HCT VFR BLD CALC: 34.6 % — SIGNIFICANT CHANGE UP (ref 34.5–45)
HCT VFR BLD CALC: 41.3 % — SIGNIFICANT CHANGE UP (ref 34.5–45)
HCT VFR BLDA CALC: 33 % — LOW (ref 39–50)
HCT VFR BLDA CALC: 37 % — LOW (ref 39–50)
HGB BLD CALC-MCNC: 10.7 G/DL — LOW (ref 11.5–15.5)
HGB BLD CALC-MCNC: 12.1 G/DL — SIGNIFICANT CHANGE UP (ref 11.5–15.5)
HGB BLD-MCNC: 10.7 G/DL — LOW (ref 11.5–15.5)
HGB BLD-MCNC: 11.8 G/DL — SIGNIFICANT CHANGE UP (ref 11.5–15.5)
INR BLD: 1.12 RATIO — SIGNIFICANT CHANGE UP (ref 0.88–1.16)
INR BLD: 1.19 RATIO — HIGH (ref 0.88–1.16)
LACTATE BLDV-MCNC: 11.6 MMOL/L — CRITICAL HIGH (ref 0.7–2)
LACTATE BLDV-MCNC: 6.6 MMOL/L — CRITICAL HIGH (ref 0.7–2)
LIDOCAIN IGE QN: 38 U/L — SIGNIFICANT CHANGE UP (ref 7–60)
LYMPHOCYTES # BLD AUTO: 1.1 K/UL — SIGNIFICANT CHANGE UP (ref 1–3.3)
LYMPHOCYTES # BLD AUTO: 6.7 % — LOW (ref 13–44)
MACROCYTES BLD QL: SIGNIFICANT CHANGE UP
MAGNESIUM SERPL-MCNC: 2.5 MG/DL — SIGNIFICANT CHANGE UP (ref 1.6–2.6)
MAGNESIUM SERPL-MCNC: 2.5 MG/DL — SIGNIFICANT CHANGE UP (ref 1.6–2.6)
MAGNESIUM SERPL-MCNC: 2.8 MG/DL — HIGH (ref 1.6–2.6)
MCHC RBC-ENTMCNC: 28.7 GM/DL — LOW (ref 32–36)
MCHC RBC-ENTMCNC: 31 GM/DL — LOW (ref 32–36)
MCHC RBC-ENTMCNC: 32.9 PG — SIGNIFICANT CHANGE UP (ref 27–34)
MCHC RBC-ENTMCNC: 33.2 PG — SIGNIFICANT CHANGE UP (ref 27–34)
MCV RBC AUTO: 107 FL — HIGH (ref 80–100)
MCV RBC AUTO: 115 FL — HIGH (ref 80–100)
MONOCYTES # BLD AUTO: 1.2 K/UL — HIGH (ref 0–0.9)
MONOCYTES NFR BLD AUTO: 7.4 % — SIGNIFICANT CHANGE UP (ref 2–14)
NEUTROPHILS # BLD AUTO: 14 K/UL — HIGH (ref 1.8–7.4)
NEUTROPHILS NFR BLD AUTO: 85.4 % — HIGH (ref 43–77)
NT-PROBNP SERPL-SCNC: HIGH PG/ML (ref 0–300)
OTHER CELLS CSF MANUAL: 11 ML/DL — LOW (ref 18–22)
OTHER CELLS CSF MANUAL: 6 ML/DL — LOW (ref 18–22)
PCO2 BLDV: 34 MMHG — LOW (ref 35–50)
PCO2 BLDV: 42 MMHG — SIGNIFICANT CHANGE UP (ref 35–50)
PH BLDV: 7.09 — CRITICAL LOW (ref 7.35–7.45)
PH BLDV: 7.38 — SIGNIFICANT CHANGE UP (ref 7.35–7.45)
PHOSPHATE SERPL-MCNC: 5.6 MG/DL — HIGH (ref 2.5–4.5)
PHOSPHATE SERPL-MCNC: 7.2 MG/DL — HIGH (ref 2.5–4.5)
PHOSPHATE SERPL-MCNC: 9.7 MG/DL — HIGH (ref 2.5–4.5)
PLAT MORPH BLD: NORMAL — SIGNIFICANT CHANGE UP
PLATELET # BLD AUTO: 277 K/UL — SIGNIFICANT CHANGE UP (ref 150–400)
PLATELET # BLD AUTO: 331 K/UL — SIGNIFICANT CHANGE UP (ref 150–400)
PO2 BLDV: 32 MMHG — SIGNIFICANT CHANGE UP (ref 25–45)
PO2 BLDV: 45 MMHG — SIGNIFICANT CHANGE UP (ref 25–45)
POIKILOCYTOSIS BLD QL AUTO: SLIGHT — SIGNIFICANT CHANGE UP
POTASSIUM BLDA-SCNC: 3.9 MMOL/L — SIGNIFICANT CHANGE UP (ref 3.5–5.3)
POTASSIUM BLDV-SCNC: 4.1 MMOL/L — SIGNIFICANT CHANGE UP (ref 3.5–5)
POTASSIUM BLDV-SCNC: 6 MMOL/L — HIGH (ref 3.5–5)
POTASSIUM SERPL-MCNC: 4.2 MMOL/L — SIGNIFICANT CHANGE UP (ref 3.5–5.3)
POTASSIUM SERPL-MCNC: 4.4 MMOL/L — SIGNIFICANT CHANGE UP (ref 3.5–5.3)
POTASSIUM SERPL-MCNC: 6 MMOL/L — HIGH (ref 3.5–5.3)
POTASSIUM SERPL-SCNC: 4.2 MMOL/L — SIGNIFICANT CHANGE UP (ref 3.5–5.3)
POTASSIUM SERPL-SCNC: 4.4 MMOL/L — SIGNIFICANT CHANGE UP (ref 3.5–5.3)
POTASSIUM SERPL-SCNC: 6 MMOL/L — HIGH (ref 3.5–5.3)
PROT SERPL-MCNC: 6.9 G/DL — SIGNIFICANT CHANGE UP (ref 6–8.3)
PROT SERPL-MCNC: 7.3 G/DL — SIGNIFICANT CHANGE UP (ref 6–8.3)
PROT SERPL-MCNC: 7.3 G/DL — SIGNIFICANT CHANGE UP (ref 6–8.3)
PROTHROM AB SERPL-ACNC: 12.1 SEC — SIGNIFICANT CHANGE UP (ref 9.8–12.7)
PROTHROM AB SERPL-ACNC: 12.9 SEC — HIGH (ref 9.8–12.7)
RAPID RVP RESULT: SIGNIFICANT CHANGE UP
RBC # BLD: 3.22 M/UL — LOW (ref 3.8–5.2)
RBC # BLD: 3.6 M/UL — LOW (ref 3.8–5.2)
RBC # FLD: 13.3 % — SIGNIFICANT CHANGE UP (ref 10.3–14.5)
RBC # FLD: 13.4 % — SIGNIFICANT CHANGE UP (ref 10.3–14.5)
RBC BLD AUTO: ABNORMAL
RH IG SCN BLD-IMP: POSITIVE — SIGNIFICANT CHANGE UP
SAO2 % BLDV: 39 % — LOW (ref 67–88)
SAO2 % BLDV: 75 % — SIGNIFICANT CHANGE UP (ref 67–88)
SODIUM SERPL-SCNC: 127 MMOL/L — LOW (ref 135–145)
SODIUM SERPL-SCNC: 133 MMOL/L — LOW (ref 135–145)
SODIUM SERPL-SCNC: 138 MMOL/L — SIGNIFICANT CHANGE UP (ref 135–145)
TROPONIN T SERPL-MCNC: 1.35 NG/ML — HIGH (ref 0–0.06)
TROPONIN T SERPL-MCNC: 6.87 NG/ML — HIGH (ref 0–0.06)
WBC # BLD: 16 K/UL — HIGH (ref 3.8–10.5)
WBC # BLD: 16.4 K/UL — HIGH (ref 3.8–10.5)
WBC # FLD AUTO: 16 K/UL — HIGH (ref 3.8–10.5)
WBC # FLD AUTO: 16.4 K/UL — HIGH (ref 3.8–10.5)

## 2018-04-29 PROCEDURE — 74018 RADEX ABDOMEN 1 VIEW: CPT | Mod: 26

## 2018-04-29 PROCEDURE — 93010 ELECTROCARDIOGRAM REPORT: CPT | Mod: 76

## 2018-04-29 PROCEDURE — 99291 CRITICAL CARE FIRST HOUR: CPT

## 2018-04-29 PROCEDURE — 71045 X-RAY EXAM CHEST 1 VIEW: CPT | Mod: 26

## 2018-04-29 PROCEDURE — 99291 CRITICAL CARE FIRST HOUR: CPT | Mod: GC

## 2018-04-29 RX ORDER — SODIUM CHLORIDE 9 MG/ML
250 INJECTION INTRAMUSCULAR; INTRAVENOUS; SUBCUTANEOUS ONCE
Qty: 0 | Refills: 0 | Status: COMPLETED | OUTPATIENT
Start: 2018-04-29 | End: 2018-04-29

## 2018-04-29 RX ORDER — PANTOPRAZOLE SODIUM 20 MG/1
40 TABLET, DELAYED RELEASE ORAL EVERY 12 HOURS
Qty: 0 | Refills: 0 | Status: DISCONTINUED | OUTPATIENT
Start: 2018-04-29 | End: 2018-05-03

## 2018-04-29 RX ORDER — SODIUM CHLORIDE 9 MG/ML
1000 INJECTION INTRAMUSCULAR; INTRAVENOUS; SUBCUTANEOUS
Qty: 0 | Refills: 0 | Status: DISCONTINUED | OUTPATIENT
Start: 2018-04-29 | End: 2018-04-29

## 2018-04-29 RX ORDER — ASPIRIN/CALCIUM CARB/MAGNESIUM 324 MG
300 TABLET ORAL ONCE
Qty: 0 | Refills: 0 | Status: COMPLETED | OUTPATIENT
Start: 2018-04-29 | End: 2018-04-29

## 2018-04-29 RX ORDER — INSULIN HUMAN 100 [IU]/ML
5 INJECTION, SOLUTION SUBCUTANEOUS ONCE
Qty: 0 | Refills: 0 | Status: COMPLETED | OUTPATIENT
Start: 2018-04-29 | End: 2018-04-29

## 2018-04-29 RX ORDER — INSULIN HUMAN 100 [IU]/ML
1.5 INJECTION, SOLUTION SUBCUTANEOUS
Qty: 100 | Refills: 0 | Status: DISCONTINUED | OUTPATIENT
Start: 2018-04-29 | End: 2018-05-02

## 2018-04-29 RX ORDER — CALCIUM GLUCONATE 100 MG/ML
2 VIAL (ML) INTRAVENOUS ONCE
Qty: 0 | Refills: 0 | Status: COMPLETED | OUTPATIENT
Start: 2018-04-29 | End: 2018-04-29

## 2018-04-29 RX ORDER — ONDANSETRON 8 MG/1
4 TABLET, FILM COATED ORAL ONCE
Qty: 0 | Refills: 0 | Status: COMPLETED | OUTPATIENT
Start: 2018-04-29 | End: 2018-04-29

## 2018-04-29 RX ORDER — PANTOPRAZOLE SODIUM 20 MG/1
40 TABLET, DELAYED RELEASE ORAL ONCE
Qty: 0 | Refills: 0 | Status: COMPLETED | OUTPATIENT
Start: 2018-04-29 | End: 2018-04-29

## 2018-04-29 RX ORDER — SODIUM BICARBONATE 1 MEQ/ML
50 SYRINGE (ML) INTRAVENOUS ONCE
Qty: 0 | Refills: 0 | Status: COMPLETED | OUTPATIENT
Start: 2018-04-29 | End: 2018-04-29

## 2018-04-29 RX ORDER — PHENYLEPHRINE HYDROCHLORIDE 10 MG/ML
0.1 INJECTION INTRAVENOUS ONCE
Qty: 0 | Refills: 0 | Status: COMPLETED | OUTPATIENT
Start: 2018-04-29 | End: 2018-04-29

## 2018-04-29 RX ADMIN — Medication 300 MILLIGRAM(S): at 17:38

## 2018-04-29 RX ADMIN — PANTOPRAZOLE SODIUM 40 MILLIGRAM(S): 20 TABLET, DELAYED RELEASE ORAL at 13:54

## 2018-04-29 RX ADMIN — SODIUM CHLORIDE 750 MILLILITER(S): 9 INJECTION INTRAMUSCULAR; INTRAVENOUS; SUBCUTANEOUS at 14:20

## 2018-04-29 RX ADMIN — Medication 50 MILLIEQUIVALENT(S): at 14:37

## 2018-04-29 RX ADMIN — PHENYLEPHRINE HYDROCHLORIDE 0.1 MILLIGRAM(S): 10 INJECTION INTRAVENOUS at 14:23

## 2018-04-29 RX ADMIN — SODIUM CHLORIDE 750 MILLILITER(S): 9 INJECTION INTRAMUSCULAR; INTRAVENOUS; SUBCUTANEOUS at 13:47

## 2018-04-29 RX ADMIN — ONDANSETRON 4 MILLIGRAM(S): 8 TABLET, FILM COATED ORAL at 13:53

## 2018-04-29 RX ADMIN — ONDANSETRON 4 MILLIGRAM(S): 8 TABLET, FILM COATED ORAL at 22:10

## 2018-04-29 RX ADMIN — Medication 200 GRAM(S): at 21:08

## 2018-04-29 RX ADMIN — INSULIN HUMAN 5 UNIT(S): 100 INJECTION, SOLUTION SUBCUTANEOUS at 13:54

## 2018-04-29 RX ADMIN — INSULIN HUMAN 10 UNIT(S)/HR: 100 INJECTION, SOLUTION SUBCUTANEOUS at 17:30

## 2018-04-29 RX ADMIN — INSULIN HUMAN 5 UNIT(S)/HR: 100 INJECTION, SOLUTION SUBCUTANEOUS at 14:02

## 2018-04-29 NOTE — H&P ADULT - HISTORY OF PRESENT ILLNESS
62 yr old female with PMHx Insulin dependent DM (on insulin pump) with frequent hospitalization for DKA with last hospitalization from 2/28/18 to 3/3/18. PMHx also includes ESRD on HD (M/T/TH/Sa) last dialysis yesterday 4/28 and friday 4/27, HTN, HLD, HFrEF (40 to 45%), CAD, MI, s/p PCI RCA PVD s/p Lt and Rt fem pop bypasses, subclavian vein stenosis s/p stent, CVA.  Recent visit to PMD for upper respiratory symptoms without fever in which  endorses received antibx (unknown name or type) ,who now presents from home via EMS with altered mental status/weakness/hypotension and hyperglycemia, abd pain   Pt and  endorse pt has been experiencing N/V of dark brown bile x 4 to 6 episodes yesterday with coffee ground vomitus x2 in ambulance  as well as  4 to 5 episodes of watery stools yesterday. Furthermore  endorses In E.D. pt found to be hyperglycemic with glucose 62 yr old female with PMHx Insulin dependent DM (on insulin pump) with frequent hospitalization for DKA with last hospitalization from 2/28/18 to 3/3/18. PMHx also includes ESRD on HD (M/T/TH/Sa) last dialysis yesterday 4/28 and friday 4/27, HTN, HLD, HFrEF (40 to 45%), CAD, MI, s/p PCI RCA PVD s/p Lt and Rt fem pop bypasses, subclavian vein stenosis s/p stent, CVA.  Recent visit to PMD for upper respiratory symptoms without fever in which  endorses received antibx (unknown name or type) ,who now presents from home via EMS with altered mental status/weakness/hypotension and hyperglycemia, abd pain     Pt and  endorse pt has been experiencing N/V of dark brown bile x 4 to 6 episodes yesterday with coffee ground vomitus x2 in ambulance  as well as  4 to 5 episodes of watery stools yesterday. Furthermore  endorses that he noticed insulin pump not "clipped" to needle this morning. Glucose last evening  states was 160. In E.D. pt found to be hyperglycemic with glucose of 1302, AGMA of 46, HCO3 8, Lactate of 11.6, VBG 7.09, sCr 6.05 (3.17 3/2018). ECG with 3mm ST elevation in V1-V2, trop 1.35 (in setting of ESRD), , MB 35.  SBP 70's. Pt received 1 liter NS, Regular insulin 5 units IVP, phenylephrine 0.1mg IVP x 1, HCO3 50 meq IVP x1, protonix 40 mg IVP, and started on insulin pump    Consult called and pt admitted to MICU for DKA 62 yr old female with PMHx Insulin dependent DM (on insulin pump) with frequent hospitalization for DKA with last hospitalization from 2/28/18 to 3/3/18. PMHx also includes ESRD on HD (M/T/TH/Sa) last dialysis yesterday 4/28 and friday 4/27, HTN, HLD, HFrEF (40 to 45%), CAD, MI, s/p PCI RCA PVD s/p Lt and Rt fem pop bypasses, subclavian vein stenosis s/p stent, CVA.  Recent visit to PMD for upper respiratory symptoms without fever in which  endorses received antibx (unknown name or type) ,who now presents from home via EMS with altered mental status/weakness/hypotension and hyperglycemia, abd pain     Pt and  endorse pt has been experiencing N/V of dark brown bile x 4 to 6 episodes yesterday with coffee ground vomitus x2 in ambulance  as well as  4 to 5 episodes of watery stools yesterday. Furthermore  endorses that he noticed insulin pump not "clipped" to needle this morning. Glucose last evening  states was 160. In E.D. pt found to be hyperglycemic with glucose of 1302, HAGMA of 46, HCO3 8, Lactate of 11.6, VBG 7.09, sCr 6.05 (3.17 3/2018). ECG with 3mm ST elevation in V1-V2, trop 1.35 (in setting of ESRD), , MB 35.  SBP 70's. Pt received 1 liter NS, Regular insulin 5 units IVP, phenylephrine 0.1mg IVP x 1, HCO3 50 meq IVP x1, protonix 40 mg IVP, and started on insulin pump    Consult called and pt admitted to MICU for DKA 62 yr old female with PMHx Insulin dependent DM (on insulin pump) with frequent hospitalization for DKA with last hospitalization from 2/28/18 to 3/3/18. PMHx also includes ESRD on HD (M/T/TH/Sa) last dialysis yesterday 4/28 and friday 4/27, HTN, HLD, HFrEF (40 to 45%), CAD, MI, s/p PCI RCA PVD s/p Lt and Rt fem pop bypasses, subclavian vein stenosis s/p stent, CVA.  Recent visit to PMD for upper respiratory symptoms without fever in which  endorses received antibx (unknown name or type) ,who now presents from home via EMS with altered mental status/weakness/hypotension and hyperglycemia, abd pain     Pt and  endorse pt has been experiencing N/V of dark brown bile x 4 to 6 episodes yesterday with coffee ground vomitus x2 in ambulance  as well as  4 to 5 episodes of watery stools yesterday. Pt on ASA and plavix. Furthermore  endorses that he noticed insulin pump not "clipped" to needle this morning. Glucose last evening  states was 160. In E.D. pt found to be hyperglycemic with glucose of 1302, HAGMA of 46, HCO3 8, Lactate of 11.6, VBG 7.09, sCr 6.05 (3.17 3/2018). ECG with 3mm ST elevation in V1-V2, trop 1.35 (in setting of ESRD), , MB 35.  SBP 70's. Pt received 1 liter NS, Regular insulin 5 units IVP, phenylephrine 0.1mg IVP x 1, HCO3 50 meq IVP x1, protonix 40 mg IVP, and started on insulin pump    Consult called and pt admitted to MICU for DKA

## 2018-04-29 NOTE — ED PROVIDER NOTE - ATTENDING CONTRIBUTION TO CARE
I have seen and evaluated this patient with the resident.   I agree with the findings  unless other wise stated.  I have made appropriate changes in documentations where needed, After my face to face bedside evaluation, I am further  notin y.o. female pw lethargy, vomiting, associated with ab pain, diffuse abdominal wall tenderness no distension Marked dehydration Lungs clear to auscaltation elevated BG. Likely DKA with possible upper GIB. Will obtain labs, fluids, EKG, cxr, evaluated and results d/w pt and her  ICU eval noted   insulin gtt  started admission to ICU. --Jay

## 2018-04-29 NOTE — H&P ADULT - RS GEN PE MLT RESP DETAILS PC
airway patent/breath sounds equal/good air movement/clear to auscultation bilaterally/diminished breath sounds, L/diminished breath sounds, R

## 2018-04-29 NOTE — ED PROVIDER NOTE - OBJECTIVE STATEMENT
62 y.o. female hx of DM on insulin pump, ESRD on HD, HTN, CAD sp stents on asa/plavix pw dark vomitus, AMS, weakness and elevated blood glucose at home for past couple of days, bibems. Similar symptoms in the past, diagnosed with DKA.  states that she hasn't been feeling well lately, went to see PCP and stated on antibiotics (pt unsure for what or which abx). Given 400cc NS by ems prior to arrival.

## 2018-04-29 NOTE — ED PROVIDER NOTE - CARE PLAN
Principal Discharge DX:	DKA (diabetic ketoacidoses)  Secondary Diagnosis:	Dehydration  Secondary Diagnosis:	Vomiting

## 2018-04-29 NOTE — H&P ADULT - ASSESSMENT
Plan:  #Neuro:  -neuro checks q 2 hrs and prn for changes  -bed rest at present  -physical therapy when stable  -restart duloxetine when diet reinstituted    #Pulm:  -supplemental O2 to maintain SPO2 > 92  -HOB >/= 30 degree angle  -incentive spirometry 10x q 2 hrs    #CV:  -pt with 2 mm ST elevation in anterior septal lead  -send cardiac enzymes now and q 8 hrs x 3  -ecg q a.m. x 3  -on ASA 81 mg - however will give 325 mg  HI now, will hold heparin gtt as pt with coffee ground vomitus  -currently hypotensive - will hold lisinopril/hydralazine/metoprolol at present  -although ESRD - will give 30cc NS IV bolus and place on maintenance NS at 100 cc/hrx 10 hrs  -obtain TTE to eval LVFx    #GI/:  -NPO at present  -as pt with coffee ground emesis - place on protonix 40 mg IV push q 12 hrs  -will obtain renal consult (as pt will need hydration for DKA will most likely need ultrafitration)    #I.D.:  -Pan culture  -pt with minimal urine output - send UA  -if fever spike will initiate antibx - zosyn 3.375 gm IV q 12 and vanco 1 gm IV x 1 (tirtate to trough of 15 to 20)    #FEN/HEME/ENDO:  -IV fluids as above  -Insulin gtt titrate to obtain ph > 7.3; HCO3 > 15 to 18; AG <12  -if glucose <250 and above parameters not met will add dextrose solution   -POC glucose q 1 hrs - titrate insulin gtt as per DKA protocol (not to alter glucose variability > 15%)  -cmp/mg++/po--4/coags/cbc with diff now and q am  -BMP/Mg++/PO--4/VBG/acetone q 4 hrs  -type and cross for 4 units PRBC  -transfuse PRBC to maintain Hgb > 7.5 Plan:  #Neuro:  -neuro checks q 2 hrs and prn for changes  -bed rest at present  -physical therapy when stable  -restart duloxetine when diet reinstituted    #Pulm:  -supplemental O2 to maintain SPO2 > 92  -HOB >/= 30 degree angle  -incentive spirometry 10x q 2 hrs    #CV:  -pt with 2 mm ST elevation in anterior septal lead  -send cardiac enzymes now and q 8 hrs x 3  -ecg q a.m. x 3  -on ASA 81 mg - however will give 325 mg  ND now, will hold heparin gtt as pt with coffee ground vomitus  -currently hypotensive - will hold lisinopril/hydralazine/metoprolol at present  -although ESRD - will give 30cc NS IV bolus and place on maintenance NS at 100 cc/hrx 10 hrs  -obtain TTE to eval LVFx    #GI/:  -NPO at present  -as pt with coffee ground emesis - place on protonix 40 mg IV push q 12 hrs  -place NGT to LIWS  -pt with c/o abd pain - will obtain abd Ultras sound to r/o pathology   -will obtain renal consult (as pt will need hydration for DKA will most likely need ultrafitration)    #I.D.:  -Pan culture  -pt with minimal urine output - send UA  -if fever spike will initiate antibx - zosyn 3.375 gm IV q 12 and vanco 1 gm IV x 1 (tirtate to trough of 15 to 20)    #FEN/HEME/ENDO:  -IV fluids as above  -Insulin gtt titrate to obtain ph > 7.3; HCO3 > 15 to 18; AG <12  -if glucose <250 and above parameters not met will add dextrose solution   -POC glucose q 1 hrs - titrate insulin gtt as per DKA protocol (not to alter glucose variability > 15%)  -cmp/mg++/po--4/coags/cbc with diff now and q am  -BMP/Mg++/PO--4/VBG/acetone q 4 hrs  -type and cross for 4 units PRBC  -transfuse PRBC to maintain Hgb > 7.5 62 yr old female with stated PMHx significant for insulin dependent DM, ESRD on HD, HFrEF, frequent admissions for DKA, last in feburary. who now presents with AMS/Hypotension/hyperglycemic associated with coffee ground emesis and watery stools. found to have HAGMA, hypglycemia of 1300. Admitted for DKA     Plan:  #Neuro:  -neuro checks q 2 hrs and prn for changes  -bed rest at present  -physical therapy when stable  -restart duloxetine when diet reinstituted    #Pulm:  -supplemental O2 to maintain SPO2 > 92  -HOB >/= 30 degree angle  -incentive spirometry 10x q 2 hrs    #CV:  -pt with 2 mm ST elevation in anterior septal lead  -send cardiac enzymes now and q 8 hrs x 3  -ecg q a.m. x 3  -on ASA 81 mg - however will give 325 mg  CA now, will hold heparin gtt as pt with coffee ground vomitus  -currently hypotensive - will hold lisinopril/hydralazine/metoprolol at present  -although ESRD - will give 30cc NS IV bolus and place on maintenance NS at 100 cc/hrx 10 hrs  -obtain TTE to eval LVFx    #GI/:  -NPO at present  -as pt with coffee ground emesis - place on protonix 40 mg IV push q 12 hrs  -place NGT to LIWS  -pt with c/o abd pain - will obtain abd Ultras sound to r/o pathology   -send stool for c-diff  -will obtain renal consult (as pt will need hydration for DKA will most likely need ultrafitration)    #I.D.:  -Pan culture  -pt with minimal urine output - send UA  -if fever spike will initiate antibx - zosyn 3.375 gm IV q 12 and vanco 1 gm IV x 1 (tirtate to trough of 15 to 20)    #FEN/HEME/ENDO:  -IV fluids as above  -Insulin gtt titrate to obtain ph > 7.3; HCO3 > 15 to 18; AG <12  -if glucose <250 and above parameters not met will add dextrose solution   -POC glucose q 1 hrs - titrate insulin gtt as per DKA protocol (not to alter glucose variability > 15%)  -cmp/mg++/po--4/coags/cbc with diff now and q am  -BMP/Mg++/PO--4/VBG/acetone q 4 hrs  -type and cross for 4 units PRBC  -transfuse PRBC to maintain Hgb > 7.5

## 2018-04-29 NOTE — ED ADULT NURSE NOTE - OBJECTIVE STATEMENT
63 y/o F, A&Ox3, enters ED w/ c/o coffee ground emesis. Pt. has ESRD - fistula in left arm, receives dialysis [id number]. Last received dialysis yesterday. Pt. is a diabetic - presents w/ insulin pump. Pt. presents hypotensive to 88/44. Pt. received 400 mL of NS by EMS prior to arrival. FS reads "HI." As per , pt. has been experiencing AMS, however, pt. presents alert, responsive and following commands but lethargic.  reports pt. has had similar symptoms in the past and dx. w/ DKA.  also endorses pt. has not been feeling well and was started on abx. by PCP (unknown what abx.). As per , pt. has been vomiting coffee ground emesis and presents w/ emesis bag w/ coffee ground emesis present. Pt. has not vomited while in ED, but  reports pt. vomited about 6-8x. Pt. has coffee-ground remnants in her oral mucosa. Pt. reports abdominal pain - tender in all 4 quadrants. Abdomen soft and round. Pt. afebrile.  reports pt. makes "very little urine." Upon assessment of insulin pump, appears pt.'s insulin pump had not been connected for the past 2 days.  reports pt. took a shower 2 nights ago and must have "not reconnected it." Endocrinology paged at [phone] - spoke w/ Nancy who reports Dr. Kay will be following up w/ pt. Insulin pump removed as per MD's orders prior to insulin drip administration - given to . Lung sounds clear bilaterally. Skin warm, dry and intact. Safety and comfort provided.

## 2018-04-29 NOTE — ED PROVIDER NOTE - CRITICAL CARE PROVIDED
conducted a detailed discussion of DNR status/additional history taking/interpretation of diagnostic studies/consult w/ pt's family directly relating to pts condition/consultation with other physicians/direct patient care (not related to procedure)/documentation

## 2018-04-29 NOTE — H&P ADULT - ATTENDING COMMENTS
Mrs. Cui is admitted in DKA in the setting of not wearing her insulin pump.  There does not appear to be an infectious precipitant although she is at increased risk for sepsis.  She has a profound metabolic acidosis, and profound hyperglycemia, with elevated sodium.  B ultrasound there does not appear to be any consolidation or evidence of pulmonary edema.  EKG shows ischemic changes and troponins are positive.  Plan to panculture, start empiric antibiotics if she appears septic.  IV insulin infusion has been started.  Will monitor anion gap, acidosis, electrolytes and replete as necessary.  Continue free water repletion correcting by approximately 10 mEq in 24 hours.  Echo to assess LV function.

## 2018-04-29 NOTE — ED PROVIDER NOTE - MEDICAL DECISION MAKING DETAILS
62 y.o. female pw lethargy, vomiting, ab pain, elevated BG. Likely DKA with possible upper GIB. Will obtain labs, fluids, EKG, cxr, likely insulin gtt and admission to ICU.

## 2018-04-29 NOTE — ED PROVIDER NOTE - PROGRESS NOTE DETAILS
Pt lethargic BP 77/39  Lungs clear No JVD will do  250 cc NS bolus --Thompson Pt still hypotensive No JVD Lungs clear will use 250 cc bolus-- Thompson

## 2018-04-29 NOTE — H&P ADULT - GASTROINTESTINAL DETAILS
soft/no masses palpable/bowel sounds normal/no bruit/no rebound tenderness/no guarding/no rigidity/no organomegaly

## 2018-04-29 NOTE — ED PROVIDER NOTE - PHYSICAL EXAMINATION
Gen: Ill appearing, lethargic, tachypneic  Head: NCAT  HEENT: MM dry, Normal conjunctiva  Lung: CTAB, no rales, rhonchi or wheezing  CV: RRR, no murmurs, rubs or gallops  Abd: soft, diffusely tender, +guarding, no rebound  MSK: No edema, no visible deformities  Neuro: No focal neurologic deficits.   Skin: Warm and dry, no evidence of rash  Psych: lethargic, A&Ox3

## 2018-04-29 NOTE — ED PROVIDER NOTE - NS ED ROS FT
ROS: denies HA, dizziness, fevers/chills, chest pain, SOB, diaphoresis,  back/neck pain, dysuria/hematuria, or rash  +ab pain, vomiting, weakness/fatigue

## 2018-04-30 DIAGNOSIS — I10 ESSENTIAL (PRIMARY) HYPERTENSION: ICD-10-CM

## 2018-04-30 DIAGNOSIS — E10.10 TYPE 1 DIABETES MELLITUS WITH KETOACIDOSIS WITHOUT COMA: ICD-10-CM

## 2018-04-30 DIAGNOSIS — I21.4 NON-ST ELEVATION (NSTEMI) MYOCARDIAL INFARCTION: ICD-10-CM

## 2018-04-30 DIAGNOSIS — E78.5 HYPERLIPIDEMIA, UNSPECIFIED: ICD-10-CM

## 2018-04-30 DIAGNOSIS — I25.10 ATHEROSCLEROTIC HEART DISEASE OF NATIVE CORONARY ARTERY WITHOUT ANGINA PECTORIS: ICD-10-CM

## 2018-04-30 DIAGNOSIS — N18.6 END STAGE RENAL DISEASE: ICD-10-CM

## 2018-04-30 DIAGNOSIS — E78.2 MIXED HYPERLIPIDEMIA: ICD-10-CM

## 2018-04-30 DIAGNOSIS — I50.20 UNSPECIFIED SYSTOLIC (CONGESTIVE) HEART FAILURE: ICD-10-CM

## 2018-04-30 DIAGNOSIS — Z29.9 ENCOUNTER FOR PROPHYLACTIC MEASURES, UNSPECIFIED: ICD-10-CM

## 2018-04-30 DIAGNOSIS — E13.10 OTHER SPECIFIED DIABETES MELLITUS WITH KETOACIDOSIS WITHOUT COMA: ICD-10-CM

## 2018-04-30 LAB
ACETONE SERPL-MCNC: ABNORMAL
ACETONE SERPL-MCNC: NEGATIVE — SIGNIFICANT CHANGE UP
ACETONE SERPL-MCNC: SIGNIFICANT CHANGE UP
ALBUMIN SERPL ELPH-MCNC: 3.4 G/DL — SIGNIFICANT CHANGE UP (ref 3.3–5)
ALBUMIN SERPL ELPH-MCNC: 3.7 G/DL — SIGNIFICANT CHANGE UP (ref 3.3–5)
ALBUMIN SERPL ELPH-MCNC: 3.9 G/DL — SIGNIFICANT CHANGE UP (ref 3.3–5)
ALBUMIN SERPL ELPH-MCNC: 4 G/DL — SIGNIFICANT CHANGE UP (ref 3.3–5)
ALBUMIN SERPL ELPH-MCNC: 4.3 G/DL — SIGNIFICANT CHANGE UP (ref 3.3–5)
ALP SERPL-CCNC: 106 U/L — SIGNIFICANT CHANGE UP (ref 40–120)
ALP SERPL-CCNC: 109 U/L — SIGNIFICANT CHANGE UP (ref 40–120)
ALP SERPL-CCNC: 112 U/L — SIGNIFICANT CHANGE UP (ref 40–120)
ALP SERPL-CCNC: 115 U/L — SIGNIFICANT CHANGE UP (ref 40–120)
ALP SERPL-CCNC: 92 U/L — SIGNIFICANT CHANGE UP (ref 40–120)
ALT FLD-CCNC: 33 U/L — SIGNIFICANT CHANGE UP (ref 10–45)
ALT FLD-CCNC: 35 U/L — SIGNIFICANT CHANGE UP (ref 10–45)
ALT FLD-CCNC: 36 U/L — SIGNIFICANT CHANGE UP (ref 10–45)
ALT FLD-CCNC: 37 U/L — SIGNIFICANT CHANGE UP (ref 10–45)
ALT FLD-CCNC: 39 U/L — SIGNIFICANT CHANGE UP (ref 10–45)
ANION GAP SERPL CALC-SCNC: 16 MMOL/L — SIGNIFICANT CHANGE UP (ref 5–17)
ANION GAP SERPL CALC-SCNC: 17 MMOL/L — SIGNIFICANT CHANGE UP (ref 5–17)
ANION GAP SERPL CALC-SCNC: 23 MMOL/L — HIGH (ref 5–17)
ANION GAP SERPL CALC-SCNC: 24 MMOL/L — HIGH (ref 5–17)
ANION GAP SERPL CALC-SCNC: 24 MMOL/L — HIGH (ref 5–17)
ANION GAP SERPL CALC-SCNC: 27 MMOL/L — HIGH (ref 5–17)
ANION GAP SERPL CALC-SCNC: 28 MMOL/L — HIGH (ref 5–17)
APTT BLD: 21.2 SEC — LOW (ref 27.5–37.4)
APTT BLD: 30 SEC — SIGNIFICANT CHANGE UP (ref 27.5–37.4)
APTT BLD: 37.6 SEC — HIGH (ref 27.5–37.4)
AST SERPL-CCNC: 103 U/L — HIGH (ref 10–40)
AST SERPL-CCNC: 153 U/L — HIGH (ref 10–40)
AST SERPL-CCNC: 157 U/L — HIGH (ref 10–40)
AST SERPL-CCNC: 190 U/L — HIGH (ref 10–40)
AST SERPL-CCNC: 233 U/L — HIGH (ref 10–40)
BASE EXCESS BLDV CALC-SCNC: 0.8 MMOL/L — SIGNIFICANT CHANGE UP (ref -2–2)
BASE EXCESS BLDV CALC-SCNC: 1.6 MMOL/L — SIGNIFICANT CHANGE UP (ref -2–2)
BASE EXCESS BLDV CALC-SCNC: 1.8 MMOL/L — SIGNIFICANT CHANGE UP (ref -2–2)
BASE EXCESS BLDV CALC-SCNC: 3.1 MMOL/L — HIGH (ref -2–2)
BASE EXCESS BLDV CALC-SCNC: 4.3 MMOL/L — HIGH (ref -2–2)
BASE EXCESS BLDV CALC-SCNC: 4.8 MMOL/L — HIGH (ref -2–2)
BILIRUB SERPL-MCNC: 0.3 MG/DL — SIGNIFICANT CHANGE UP (ref 0.2–1.2)
BILIRUB SERPL-MCNC: 0.4 MG/DL — SIGNIFICANT CHANGE UP (ref 0.2–1.2)
BUN SERPL-MCNC: 20 MG/DL — SIGNIFICANT CHANGE UP (ref 7–23)
BUN SERPL-MCNC: 21 MG/DL — SIGNIFICANT CHANGE UP (ref 7–23)
BUN SERPL-MCNC: 54 MG/DL — HIGH (ref 7–23)
BUN SERPL-MCNC: 56 MG/DL — HIGH (ref 7–23)
BUN SERPL-MCNC: 59 MG/DL — HIGH (ref 7–23)
BUN SERPL-MCNC: 60 MG/DL — HIGH (ref 7–23)
BUN SERPL-MCNC: 63 MG/DL — HIGH (ref 7–23)
CA-I SERPL-SCNC: 0.96 MMOL/L — LOW (ref 1.12–1.3)
CA-I SERPL-SCNC: 0.97 MMOL/L — LOW (ref 1.12–1.3)
CA-I SERPL-SCNC: 1 MMOL/L — LOW (ref 1.12–1.3)
CA-I SERPL-SCNC: 1.03 MMOL/L — LOW (ref 1.12–1.3)
CA-I SERPL-SCNC: 1.04 MMOL/L — LOW (ref 1.12–1.3)
CA-I SERPL-SCNC: 1.13 MMOL/L — SIGNIFICANT CHANGE UP (ref 1.12–1.3)
CALCIUM SERPL-MCNC: 8.2 MG/DL — LOW (ref 8.4–10.5)
CALCIUM SERPL-MCNC: 8.3 MG/DL — LOW (ref 8.4–10.5)
CALCIUM SERPL-MCNC: 8.4 MG/DL — SIGNIFICANT CHANGE UP (ref 8.4–10.5)
CALCIUM SERPL-MCNC: 8.6 MG/DL — SIGNIFICANT CHANGE UP (ref 8.4–10.5)
CALCIUM SERPL-MCNC: 8.9 MG/DL — SIGNIFICANT CHANGE UP (ref 8.4–10.5)
CALCIUM SERPL-MCNC: 8.9 MG/DL — SIGNIFICANT CHANGE UP (ref 8.4–10.5)
CALCIUM SERPL-MCNC: 9.1 MG/DL — SIGNIFICANT CHANGE UP (ref 8.4–10.5)
CHLORIDE BLDV-SCNC: 88 MMOL/L — LOW (ref 96–108)
CHLORIDE BLDV-SCNC: 91 MMOL/L — LOW (ref 96–108)
CHLORIDE BLDV-SCNC: 92 MMOL/L — LOW (ref 96–108)
CHLORIDE BLDV-SCNC: 96 MMOL/L — SIGNIFICANT CHANGE UP (ref 96–108)
CHLORIDE SERPL-SCNC: 83 MMOL/L — LOW (ref 96–108)
CHLORIDE SERPL-SCNC: 86 MMOL/L — LOW (ref 96–108)
CHLORIDE SERPL-SCNC: 86 MMOL/L — LOW (ref 96–108)
CHLORIDE SERPL-SCNC: 88 MMOL/L — LOW (ref 96–108)
CHLORIDE SERPL-SCNC: 88 MMOL/L — LOW (ref 96–108)
CHLORIDE SERPL-SCNC: 93 MMOL/L — LOW (ref 96–108)
CHLORIDE SERPL-SCNC: 93 MMOL/L — LOW (ref 96–108)
CHOLEST SERPL-MCNC: 113 MG/DL — SIGNIFICANT CHANGE UP (ref 10–199)
CK MB BLD-MCNC: 5.8 % — HIGH (ref 0–3.5)
CK MB BLD-MCNC: 6.4 % — HIGH (ref 0–3.5)
CK MB CFR SERPL CALC: 150.5 NG/ML — HIGH (ref 0–3.8)
CK MB CFR SERPL CALC: 49.1 NG/ML — HIGH (ref 0–3.8)
CK MB CFR SERPL CALC: 88.7 NG/ML — HIGH (ref 0–3.8)
CK SERPL-CCNC: 1377 U/L — HIGH (ref 25–170)
CK SERPL-CCNC: 1827 U/L — HIGH (ref 25–170)
CK SERPL-CCNC: 846 U/L — HIGH (ref 25–170)
CO2 BLDV-SCNC: 27 MMOL/L — SIGNIFICANT CHANGE UP (ref 22–30)
CO2 BLDV-SCNC: 28 MMOL/L — SIGNIFICANT CHANGE UP (ref 22–30)
CO2 BLDV-SCNC: 30 MMOL/L — SIGNIFICANT CHANGE UP (ref 22–30)
CO2 BLDV-SCNC: 31 MMOL/L — HIGH (ref 22–30)
CO2 SERPL-SCNC: 21 MMOL/L — LOW (ref 22–31)
CO2 SERPL-SCNC: 22 MMOL/L — SIGNIFICANT CHANGE UP (ref 22–31)
CO2 SERPL-SCNC: 25 MMOL/L — SIGNIFICANT CHANGE UP (ref 22–31)
CO2 SERPL-SCNC: 25 MMOL/L — SIGNIFICANT CHANGE UP (ref 22–31)
CO2 SERPL-SCNC: 26 MMOL/L — SIGNIFICANT CHANGE UP (ref 22–31)
CO2 SERPL-SCNC: 27 MMOL/L — SIGNIFICANT CHANGE UP (ref 22–31)
CO2 SERPL-SCNC: 28 MMOL/L — SIGNIFICANT CHANGE UP (ref 22–31)
CREAT SERPL-MCNC: 3.08 MG/DL — HIGH (ref 0.5–1.3)
CREAT SERPL-MCNC: 3.66 MG/DL — HIGH (ref 0.5–1.3)
CREAT SERPL-MCNC: 6.41 MG/DL — HIGH (ref 0.5–1.3)
CREAT SERPL-MCNC: 6.71 MG/DL — HIGH (ref 0.5–1.3)
CREAT SERPL-MCNC: 7.06 MG/DL — HIGH (ref 0.5–1.3)
CREAT SERPL-MCNC: 7.13 MG/DL — HIGH (ref 0.5–1.3)
CREAT SERPL-MCNC: 7.25 MG/DL — HIGH (ref 0.5–1.3)
GAS PNL BLDV: 127 MMOL/L — LOW (ref 136–145)
GAS PNL BLDV: 130 MMOL/L — LOW (ref 136–145)
GAS PNL BLDV: 133 MMOL/L — LOW (ref 136–145)
GAS PNL BLDV: 133 MMOL/L — LOW (ref 136–145)
GAS PNL BLDV: 134 MMOL/L — LOW (ref 136–145)
GAS PNL BLDV: 135 MMOL/L — LOW (ref 136–145)
GAS PNL BLDV: SIGNIFICANT CHANGE UP
GLUCOSE BLDC GLUCOMTR-MCNC: 100 MG/DL — HIGH (ref 70–99)
GLUCOSE BLDC GLUCOMTR-MCNC: 120 MG/DL — HIGH (ref 70–99)
GLUCOSE BLDC GLUCOMTR-MCNC: 120 MG/DL — HIGH (ref 70–99)
GLUCOSE BLDC GLUCOMTR-MCNC: 127 MG/DL — HIGH (ref 70–99)
GLUCOSE BLDC GLUCOMTR-MCNC: 133 MG/DL — HIGH (ref 70–99)
GLUCOSE BLDC GLUCOMTR-MCNC: 161 MG/DL — HIGH (ref 70–99)
GLUCOSE BLDC GLUCOMTR-MCNC: 171 MG/DL — HIGH (ref 70–99)
GLUCOSE BLDC GLUCOMTR-MCNC: 182 MG/DL — HIGH (ref 70–99)
GLUCOSE BLDC GLUCOMTR-MCNC: 206 MG/DL — HIGH (ref 70–99)
GLUCOSE BLDC GLUCOMTR-MCNC: 220 MG/DL — HIGH (ref 70–99)
GLUCOSE BLDC GLUCOMTR-MCNC: 222 MG/DL — HIGH (ref 70–99)
GLUCOSE BLDC GLUCOMTR-MCNC: 233 MG/DL — HIGH (ref 70–99)
GLUCOSE BLDC GLUCOMTR-MCNC: 234 MG/DL — HIGH (ref 70–99)
GLUCOSE BLDC GLUCOMTR-MCNC: 236 MG/DL — HIGH (ref 70–99)
GLUCOSE BLDC GLUCOMTR-MCNC: 244 MG/DL — HIGH (ref 70–99)
GLUCOSE BLDC GLUCOMTR-MCNC: 247 MG/DL — HIGH (ref 70–99)
GLUCOSE BLDC GLUCOMTR-MCNC: 250 MG/DL — HIGH (ref 70–99)
GLUCOSE BLDC GLUCOMTR-MCNC: 263 MG/DL — HIGH (ref 70–99)
GLUCOSE BLDC GLUCOMTR-MCNC: 281 MG/DL — HIGH (ref 70–99)
GLUCOSE BLDC GLUCOMTR-MCNC: 292 MG/DL — HIGH (ref 70–99)
GLUCOSE BLDC GLUCOMTR-MCNC: 79 MG/DL — SIGNIFICANT CHANGE UP (ref 70–99)
GLUCOSE BLDC GLUCOMTR-MCNC: 79 MG/DL — SIGNIFICANT CHANGE UP (ref 70–99)
GLUCOSE BLDV-MCNC: 116 MG/DL — HIGH (ref 70–99)
GLUCOSE BLDV-MCNC: 136 MG/DL — HIGH (ref 70–99)
GLUCOSE BLDV-MCNC: 221 MG/DL — HIGH (ref 70–99)
GLUCOSE BLDV-MCNC: 224 MG/DL — HIGH (ref 70–99)
GLUCOSE BLDV-MCNC: 257 MG/DL — HIGH (ref 70–99)
GLUCOSE BLDV-MCNC: 274 MG/DL — HIGH (ref 70–99)
GLUCOSE SERPL-MCNC: 111 MG/DL — HIGH (ref 70–99)
GLUCOSE SERPL-MCNC: 128 MG/DL — HIGH (ref 70–99)
GLUCOSE SERPL-MCNC: 131 MG/DL — HIGH (ref 70–99)
GLUCOSE SERPL-MCNC: 225 MG/DL — HIGH (ref 70–99)
GLUCOSE SERPL-MCNC: 227 MG/DL — HIGH (ref 70–99)
GLUCOSE SERPL-MCNC: 260 MG/DL — HIGH (ref 70–99)
GLUCOSE SERPL-MCNC: 268 MG/DL — HIGH (ref 70–99)
HAV IGM SER-ACNC: SIGNIFICANT CHANGE UP
HBV CORE IGM SER-ACNC: SIGNIFICANT CHANGE UP
HBV SURFACE AG SER-ACNC: SIGNIFICANT CHANGE UP
HCO3 BLDV-SCNC: 26 MMOL/L — SIGNIFICANT CHANGE UP (ref 21–29)
HCO3 BLDV-SCNC: 26 MMOL/L — SIGNIFICANT CHANGE UP (ref 21–29)
HCO3 BLDV-SCNC: 28 MMOL/L — SIGNIFICANT CHANGE UP (ref 21–29)
HCO3 BLDV-SCNC: 29 MMOL/L — SIGNIFICANT CHANGE UP (ref 21–29)
HCT VFR BLD CALC: 31.7 % — LOW (ref 34.5–45)
HCT VFR BLD CALC: 34.1 % — LOW (ref 34.5–45)
HCT VFR BLD CALC: 35.4 % — SIGNIFICANT CHANGE UP (ref 34.5–45)
HCT VFR BLD CALC: 38.4 % — SIGNIFICANT CHANGE UP (ref 34.5–45)
HCT VFR BLDA CALC: 33 % — LOW (ref 39–50)
HCT VFR BLDA CALC: 33 % — LOW (ref 39–50)
HCT VFR BLDA CALC: 34 % — LOW (ref 39–50)
HCT VFR BLDA CALC: 35 % — LOW (ref 39–50)
HCT VFR BLDA CALC: 35 % — LOW (ref 39–50)
HCT VFR BLDA CALC: 36 % — LOW (ref 39–50)
HCV AB S/CO SERPL IA: 0.16 S/CO — SIGNIFICANT CHANGE UP
HCV AB SERPL-IMP: SIGNIFICANT CHANGE UP
HDLC SERPL-MCNC: 59 MG/DL — SIGNIFICANT CHANGE UP (ref 40–125)
HGB BLD CALC-MCNC: 10.6 G/DL — LOW (ref 11.5–15.5)
HGB BLD CALC-MCNC: 10.8 G/DL — LOW (ref 11.5–15.5)
HGB BLD CALC-MCNC: 11.2 G/DL — LOW (ref 11.5–15.5)
HGB BLD CALC-MCNC: 11.2 G/DL — LOW (ref 11.5–15.5)
HGB BLD CALC-MCNC: 11.4 G/DL — LOW (ref 11.5–15.5)
HGB BLD CALC-MCNC: 11.8 G/DL — SIGNIFICANT CHANGE UP (ref 11.5–15.5)
HGB BLD-MCNC: 10.9 G/DL — LOW (ref 11.5–15.5)
HGB BLD-MCNC: 11.4 G/DL — LOW (ref 11.5–15.5)
HGB BLD-MCNC: 11.7 G/DL — SIGNIFICANT CHANGE UP (ref 11.5–15.5)
HGB BLD-MCNC: 12.8 G/DL — SIGNIFICANT CHANGE UP (ref 11.5–15.5)
HOROWITZ INDEX BLDV+IHG-RTO: 21 — SIGNIFICANT CHANGE UP
HOROWITZ INDEX BLDV+IHG-RTO: 21 — SIGNIFICANT CHANGE UP
LACTATE BLDV-MCNC: 0.8 MMOL/L — SIGNIFICANT CHANGE UP (ref 0.7–2)
LACTATE BLDV-MCNC: 1.8 MMOL/L — SIGNIFICANT CHANGE UP (ref 0.7–2)
LACTATE BLDV-MCNC: 2.5 MMOL/L — HIGH (ref 0.7–2)
LACTATE BLDV-MCNC: 3.4 MMOL/L — HIGH (ref 0.7–2)
LACTATE BLDV-MCNC: 3.4 MMOL/L — HIGH (ref 0.7–2)
LACTATE BLDV-MCNC: 3.7 MMOL/L — HIGH (ref 0.7–2)
LIPID PNL WITH DIRECT LDL SERPL: 37 MG/DL — SIGNIFICANT CHANGE UP
MAGNESIUM SERPL-MCNC: 2.1 MG/DL — SIGNIFICANT CHANGE UP (ref 1.6–2.6)
MAGNESIUM SERPL-MCNC: 2.2 MG/DL — SIGNIFICANT CHANGE UP (ref 1.6–2.6)
MAGNESIUM SERPL-MCNC: 2.2 MG/DL — SIGNIFICANT CHANGE UP (ref 1.6–2.6)
MAGNESIUM SERPL-MCNC: 2.3 MG/DL — SIGNIFICANT CHANGE UP (ref 1.6–2.6)
MAGNESIUM SERPL-MCNC: 2.4 MG/DL — SIGNIFICANT CHANGE UP (ref 1.6–2.6)
MAGNESIUM SERPL-MCNC: 2.5 MG/DL — SIGNIFICANT CHANGE UP (ref 1.6–2.6)
MAGNESIUM SERPL-MCNC: 3.1 MG/DL — HIGH (ref 1.6–2.6)
MCHC RBC-ENTMCNC: 32.1 GM/DL — SIGNIFICANT CHANGE UP (ref 32–36)
MCHC RBC-ENTMCNC: 32.5 PG — SIGNIFICANT CHANGE UP (ref 27–34)
MCHC RBC-ENTMCNC: 33.4 GM/DL — SIGNIFICANT CHANGE UP (ref 32–36)
MCHC RBC-ENTMCNC: 33.8 PG — SIGNIFICANT CHANGE UP (ref 27–34)
MCHC RBC-ENTMCNC: 34.2 GM/DL — SIGNIFICANT CHANGE UP (ref 32–36)
MCHC RBC-ENTMCNC: 34.4 PG — HIGH (ref 27–34)
MCHC RBC-ENTMCNC: 34.6 GM/DL — SIGNIFICANT CHANGE UP (ref 32–36)
MCHC RBC-ENTMCNC: 34.9 PG — HIGH (ref 27–34)
MCV RBC AUTO: 100 FL — SIGNIFICANT CHANGE UP (ref 80–100)
MCV RBC AUTO: 101 FL — HIGH (ref 80–100)
OTHER CELLS CSF MANUAL: 10 ML/DL — LOW (ref 18–22)
OTHER CELLS CSF MANUAL: 10 ML/DL — LOW (ref 18–22)
OTHER CELLS CSF MANUAL: 11 ML/DL — LOW (ref 18–22)
OTHER CELLS CSF MANUAL: 13 ML/DL — LOW (ref 18–22)
OTHER CELLS CSF MANUAL: 14 ML/DL — LOW (ref 18–22)
OTHER CELLS CSF MANUAL: 16 ML/DL — LOW (ref 18–22)
PCO2 BLDV: 42 MMHG — SIGNIFICANT CHANGE UP (ref 35–50)
PCO2 BLDV: 45 MMHG — SIGNIFICANT CHANGE UP (ref 35–50)
PCO2 BLDV: 46 MMHG — SIGNIFICANT CHANGE UP (ref 35–50)
PCO2 BLDV: 47 MMHG — SIGNIFICANT CHANGE UP (ref 35–50)
PCO2 BLDV: 52 MMHG — HIGH (ref 35–50)
PCO2 BLDV: 55 MMHG — HIGH (ref 35–50)
PH BLDV: 7.33 — LOW (ref 7.35–7.45)
PH BLDV: 7.36 — SIGNIFICANT CHANGE UP (ref 7.35–7.45)
PH BLDV: 7.36 — SIGNIFICANT CHANGE UP (ref 7.35–7.45)
PH BLDV: 7.41 — SIGNIFICANT CHANGE UP (ref 7.35–7.45)
PH BLDV: 7.41 — SIGNIFICANT CHANGE UP (ref 7.35–7.45)
PH BLDV: 7.43 — SIGNIFICANT CHANGE UP (ref 7.35–7.45)
PHOSPHATE SERPL-MCNC: 2.8 MG/DL — SIGNIFICANT CHANGE UP (ref 2.5–4.5)
PHOSPHATE SERPL-MCNC: 4.6 MG/DL — HIGH (ref 2.5–4.5)
PHOSPHATE SERPL-MCNC: 6.3 MG/DL — HIGH (ref 2.5–4.5)
PHOSPHATE SERPL-MCNC: 6.4 MG/DL — HIGH (ref 2.5–4.5)
PHOSPHATE SERPL-MCNC: 6.6 MG/DL — HIGH (ref 2.5–4.5)
PHOSPHATE SERPL-MCNC: 6.8 MG/DL — HIGH (ref 2.5–4.5)
PHOSPHATE SERPL-MCNC: 7.6 MG/DL — HIGH (ref 2.5–4.5)
PLATELET # BLD AUTO: 253 K/UL — SIGNIFICANT CHANGE UP (ref 150–400)
PLATELET # BLD AUTO: 257 K/UL — SIGNIFICANT CHANGE UP (ref 150–400)
PLATELET # BLD AUTO: 272 K/UL — SIGNIFICANT CHANGE UP (ref 150–400)
PLATELET # BLD AUTO: 283 K/UL — SIGNIFICANT CHANGE UP (ref 150–400)
PO2 BLDV: 38 MMHG — SIGNIFICANT CHANGE UP (ref 25–45)
PO2 BLDV: 41 MMHG — SIGNIFICANT CHANGE UP (ref 25–45)
PO2 BLDV: 44 MMHG — SIGNIFICANT CHANGE UP (ref 25–45)
PO2 BLDV: 53 MMHG — HIGH (ref 25–45)
PO2 BLDV: 72 MMHG — HIGH (ref 25–45)
PO2 BLDV: 79 MMHG — HIGH (ref 25–45)
POTASSIUM BLDV-SCNC: 3.1 MMOL/L — LOW (ref 3.5–5)
POTASSIUM BLDV-SCNC: 4.1 MMOL/L — SIGNIFICANT CHANGE UP (ref 3.5–5)
POTASSIUM BLDV-SCNC: 4.3 MMOL/L — SIGNIFICANT CHANGE UP (ref 3.5–5)
POTASSIUM BLDV-SCNC: 4.4 MMOL/L — SIGNIFICANT CHANGE UP (ref 3.5–5)
POTASSIUM BLDV-SCNC: 5 MMOL/L — SIGNIFICANT CHANGE UP (ref 3.5–5)
POTASSIUM BLDV-SCNC: 9 MMOL/L — CRITICAL HIGH (ref 3.5–5)
POTASSIUM SERPL-MCNC: 3.2 MMOL/L — LOW (ref 3.5–5.3)
POTASSIUM SERPL-MCNC: 4.2 MMOL/L — SIGNIFICANT CHANGE UP (ref 3.5–5.3)
POTASSIUM SERPL-MCNC: 4.6 MMOL/L — SIGNIFICANT CHANGE UP (ref 3.5–5.3)
POTASSIUM SERPL-MCNC: 4.7 MMOL/L — SIGNIFICANT CHANGE UP (ref 3.5–5.3)
POTASSIUM SERPL-MCNC: 4.9 MMOL/L — SIGNIFICANT CHANGE UP (ref 3.5–5.3)
POTASSIUM SERPL-MCNC: 5.5 MMOL/L — HIGH (ref 3.5–5.3)
POTASSIUM SERPL-MCNC: >9 MMOL/L — CRITICAL HIGH (ref 3.5–5.3)
POTASSIUM SERPL-SCNC: 3.2 MMOL/L — LOW (ref 3.5–5.3)
POTASSIUM SERPL-SCNC: 4.2 MMOL/L — SIGNIFICANT CHANGE UP (ref 3.5–5.3)
POTASSIUM SERPL-SCNC: 4.6 MMOL/L — SIGNIFICANT CHANGE UP (ref 3.5–5.3)
POTASSIUM SERPL-SCNC: 4.7 MMOL/L — SIGNIFICANT CHANGE UP (ref 3.5–5.3)
POTASSIUM SERPL-SCNC: 4.9 MMOL/L — SIGNIFICANT CHANGE UP (ref 3.5–5.3)
POTASSIUM SERPL-SCNC: 5.5 MMOL/L — HIGH (ref 3.5–5.3)
POTASSIUM SERPL-SCNC: >9 MMOL/L — CRITICAL HIGH (ref 3.5–5.3)
PROT SERPL-MCNC: 6.7 G/DL — SIGNIFICANT CHANGE UP (ref 6–8.3)
PROT SERPL-MCNC: 6.9 G/DL — SIGNIFICANT CHANGE UP (ref 6–8.3)
PROT SERPL-MCNC: 7 G/DL — SIGNIFICANT CHANGE UP (ref 6–8.3)
PROT SERPL-MCNC: 7 G/DL — SIGNIFICANT CHANGE UP (ref 6–8.3)
PROT SERPL-MCNC: 7.1 G/DL — SIGNIFICANT CHANGE UP (ref 6–8.3)
RBC # BLD: 3.14 M/UL — LOW (ref 3.8–5.2)
RBC # BLD: 3.39 M/UL — LOW (ref 3.8–5.2)
RBC # BLD: 3.5 M/UL — LOW (ref 3.8–5.2)
RBC # BLD: 3.79 M/UL — LOW (ref 3.8–5.2)
RBC # FLD: 13 % — SIGNIFICANT CHANGE UP (ref 10.3–14.5)
RBC # FLD: 13.3 % — SIGNIFICANT CHANGE UP (ref 10.3–14.5)
RBC # FLD: 13.3 % — SIGNIFICANT CHANGE UP (ref 10.3–14.5)
RBC # FLD: 13.9 % — SIGNIFICANT CHANGE UP (ref 10.3–14.5)
SAO2 % BLDV: 62 % — LOW (ref 67–88)
SAO2 % BLDV: 70 % — SIGNIFICANT CHANGE UP (ref 67–88)
SAO2 % BLDV: 74 % — SIGNIFICANT CHANGE UP (ref 67–88)
SAO2 % BLDV: 84 % — SIGNIFICANT CHANGE UP (ref 67–88)
SAO2 % BLDV: 94 % — HIGH (ref 67–88)
SAO2 % BLDV: 95 % — HIGH (ref 67–88)
SODIUM SERPL-SCNC: 127 MMOL/L — LOW (ref 135–145)
SODIUM SERPL-SCNC: 135 MMOL/L — SIGNIFICANT CHANGE UP (ref 135–145)
SODIUM SERPL-SCNC: 137 MMOL/L — SIGNIFICANT CHANGE UP (ref 135–145)
SODIUM SERPL-SCNC: 138 MMOL/L — SIGNIFICANT CHANGE UP (ref 135–145)
SODIUM SERPL-SCNC: 139 MMOL/L — SIGNIFICANT CHANGE UP (ref 135–145)
TOTAL CHOLESTEROL/HDL RATIO MEASUREMENT: 1.9 RATIO — LOW (ref 3.3–7.1)
TRIGL SERPL-MCNC: 86 MG/DL — SIGNIFICANT CHANGE UP (ref 10–149)
TROPONIN T SERPL-MCNC: 11.36 NG/ML — HIGH (ref 0–0.06)
TROPONIN T SERPL-MCNC: 8.84 NG/ML — HIGH (ref 0–0.06)
TROPONIN T SERPL-MCNC: 9.69 NG/ML — HIGH (ref 0–0.06)
WBC # BLD: 16.7 K/UL — HIGH (ref 3.8–10.5)
WBC # BLD: 17.5 K/UL — HIGH (ref 3.8–10.5)
WBC # BLD: 18.4 K/UL — HIGH (ref 3.8–10.5)
WBC # BLD: 21.6 K/UL — HIGH (ref 3.8–10.5)
WBC # FLD AUTO: 16.7 K/UL — HIGH (ref 3.8–10.5)
WBC # FLD AUTO: 17.5 K/UL — HIGH (ref 3.8–10.5)
WBC # FLD AUTO: 18.4 K/UL — HIGH (ref 3.8–10.5)
WBC # FLD AUTO: 21.6 K/UL — HIGH (ref 3.8–10.5)

## 2018-04-30 PROCEDURE — 99291 CRITICAL CARE FIRST HOUR: CPT

## 2018-04-30 PROCEDURE — 74174 CTA ABD&PLVS W/CONTRAST: CPT | Mod: 26

## 2018-04-30 PROCEDURE — 93306 TTE W/DOPPLER COMPLETE: CPT | Mod: 26

## 2018-04-30 PROCEDURE — 76700 US EXAM ABDOM COMPLETE: CPT | Mod: 26

## 2018-04-30 PROCEDURE — 93010 ELECTROCARDIOGRAM REPORT: CPT

## 2018-04-30 PROCEDURE — 99223 1ST HOSP IP/OBS HIGH 75: CPT

## 2018-04-30 RX ORDER — PIPERACILLIN AND TAZOBACTAM 4; .5 G/20ML; G/20ML
3.38 INJECTION, POWDER, LYOPHILIZED, FOR SOLUTION INTRAVENOUS EVERY 12 HOURS
Qty: 0 | Refills: 0 | Status: DISCONTINUED | OUTPATIENT
Start: 2018-04-30 | End: 2018-05-03

## 2018-04-30 RX ORDER — HEPARIN SODIUM 5000 [USP'U]/ML
3200 INJECTION INTRAVENOUS; SUBCUTANEOUS EVERY 6 HOURS
Qty: 0 | Refills: 0 | Status: DISCONTINUED | OUTPATIENT
Start: 2018-04-30 | End: 2018-04-30

## 2018-04-30 RX ORDER — CLOPIDOGREL BISULFATE 75 MG/1
75 TABLET, FILM COATED ORAL DAILY
Qty: 0 | Refills: 0 | Status: DISCONTINUED | OUTPATIENT
Start: 2018-04-30 | End: 2018-05-05

## 2018-04-30 RX ORDER — METOPROLOL TARTRATE 50 MG
25 TABLET ORAL DAILY
Qty: 0 | Refills: 0 | Status: DISCONTINUED | OUTPATIENT
Start: 2018-04-30 | End: 2018-05-01

## 2018-04-30 RX ORDER — ATORVASTATIN CALCIUM 80 MG/1
80 TABLET, FILM COATED ORAL AT BEDTIME
Qty: 0 | Refills: 0 | Status: DISCONTINUED | OUTPATIENT
Start: 2018-04-30 | End: 2018-05-05

## 2018-04-30 RX ORDER — SODIUM CHLORIDE 9 MG/ML
1000 INJECTION, SOLUTION INTRAVENOUS
Qty: 0 | Refills: 0 | Status: DISCONTINUED | OUTPATIENT
Start: 2018-04-30 | End: 2018-05-02

## 2018-04-30 RX ORDER — CALCIUM GLUCONATE 100 MG/ML
2 VIAL (ML) INTRAVENOUS ONCE
Qty: 0 | Refills: 0 | Status: COMPLETED | OUTPATIENT
Start: 2018-04-30 | End: 2018-04-30

## 2018-04-30 RX ORDER — HEPARIN SODIUM 5000 [USP'U]/ML
3200 INJECTION INTRAVENOUS; SUBCUTANEOUS ONCE
Qty: 0 | Refills: 0 | Status: COMPLETED | OUTPATIENT
Start: 2018-04-30 | End: 2018-04-30

## 2018-04-30 RX ORDER — METOPROLOL TARTRATE 50 MG
2.5 TABLET ORAL EVERY 6 HOURS
Qty: 0 | Refills: 0 | Status: DISCONTINUED | OUTPATIENT
Start: 2018-04-30 | End: 2018-04-30

## 2018-04-30 RX ORDER — SODIUM CHLORIDE 9 MG/ML
1000 INJECTION, SOLUTION INTRAVENOUS
Qty: 0 | Refills: 0 | Status: DISCONTINUED | OUTPATIENT
Start: 2018-04-30 | End: 2018-04-30

## 2018-04-30 RX ORDER — ALBUMIN HUMAN 25 %
100 VIAL (ML) INTRAVENOUS ONCE
Qty: 0 | Refills: 0 | Status: COMPLETED | OUTPATIENT
Start: 2018-04-30 | End: 2018-04-30

## 2018-04-30 RX ORDER — INSULIN LISPRO 100/ML
VIAL (ML) SUBCUTANEOUS EVERY 4 HOURS
Qty: 0 | Refills: 0 | Status: DISCONTINUED | OUTPATIENT
Start: 2018-04-30 | End: 2018-04-30

## 2018-04-30 RX ORDER — DEXTROSE 50 % IN WATER 50 %
25 SYRINGE (ML) INTRAVENOUS ONCE
Qty: 0 | Refills: 0 | Status: COMPLETED | OUTPATIENT
Start: 2018-04-30 | End: 2018-04-30

## 2018-04-30 RX ORDER — HEPARIN SODIUM 5000 [USP'U]/ML
INJECTION INTRAVENOUS; SUBCUTANEOUS
Qty: 25000 | Refills: 0 | Status: DISCONTINUED | OUTPATIENT
Start: 2018-04-30 | End: 2018-05-01

## 2018-04-30 RX ORDER — HUMAN INSULIN 100 [IU]/ML
9 INJECTION, SUSPENSION SUBCUTANEOUS EVERY 8 HOURS
Qty: 0 | Refills: 0 | Status: DISCONTINUED | OUTPATIENT
Start: 2018-04-30 | End: 2018-04-30

## 2018-04-30 RX ORDER — ASPIRIN/CALCIUM CARB/MAGNESIUM 324 MG
81 TABLET ORAL DAILY
Qty: 0 | Refills: 0 | Status: DISCONTINUED | OUTPATIENT
Start: 2018-04-30 | End: 2018-05-05

## 2018-04-30 RX ADMIN — HEPARIN SODIUM 950 UNIT(S)/HR: 5000 INJECTION INTRAVENOUS; SUBCUTANEOUS at 16:20

## 2018-04-30 RX ADMIN — Medication 50 MILLILITER(S): at 23:18

## 2018-04-30 RX ADMIN — PIPERACILLIN AND TAZOBACTAM 25 GRAM(S): 4; .5 INJECTION, POWDER, LYOPHILIZED, FOR SOLUTION INTRAVENOUS at 21:05

## 2018-04-30 RX ADMIN — PANTOPRAZOLE SODIUM 40 MILLIGRAM(S): 20 TABLET, DELAYED RELEASE ORAL at 01:43

## 2018-04-30 RX ADMIN — SODIUM CHLORIDE 10 MILLILITER(S): 9 INJECTION, SOLUTION INTRAVENOUS at 14:18

## 2018-04-30 RX ADMIN — Medication 200 GRAM(S): at 03:58

## 2018-04-30 RX ADMIN — CLOPIDOGREL BISULFATE 75 MILLIGRAM(S): 75 TABLET, FILM COATED ORAL at 02:13

## 2018-04-30 RX ADMIN — ATORVASTATIN CALCIUM 80 MILLIGRAM(S): 80 TABLET, FILM COATED ORAL at 02:13

## 2018-04-30 RX ADMIN — HUMAN INSULIN 9 UNIT(S): 100 INJECTION, SUSPENSION SUBCUTANEOUS at 09:23

## 2018-04-30 RX ADMIN — SODIUM CHLORIDE 20 MILLILITER(S): 9 INJECTION, SOLUTION INTRAVENOUS at 05:15

## 2018-04-30 RX ADMIN — Medication 81 MILLIGRAM(S): at 02:13

## 2018-04-30 RX ADMIN — ATORVASTATIN CALCIUM 80 MILLIGRAM(S): 80 TABLET, FILM COATED ORAL at 21:05

## 2018-04-30 RX ADMIN — HEPARIN SODIUM 650 UNIT(S)/HR: 5000 INJECTION INTRAVENOUS; SUBCUTANEOUS at 01:50

## 2018-04-30 RX ADMIN — SODIUM CHLORIDE 50 MILLILITER(S): 9 INJECTION, SOLUTION INTRAVENOUS at 00:11

## 2018-04-30 RX ADMIN — HEPARIN SODIUM 1100 UNIT(S)/HR: 5000 INJECTION INTRAVENOUS; SUBCUTANEOUS at 22:49

## 2018-04-30 RX ADMIN — Medication 25 MILLILITER(S): at 09:49

## 2018-04-30 RX ADMIN — Medication 25 MILLIGRAM(S): at 05:14

## 2018-04-30 RX ADMIN — HEPARIN SODIUM 3200 UNIT(S): 5000 INJECTION INTRAVENOUS; SUBCUTANEOUS at 01:42

## 2018-04-30 RX ADMIN — PANTOPRAZOLE SODIUM 40 MILLIGRAM(S): 20 TABLET, DELAYED RELEASE ORAL at 14:15

## 2018-04-30 RX ADMIN — HEPARIN SODIUM 800 UNIT(S)/HR: 5000 INJECTION INTRAVENOUS; SUBCUTANEOUS at 09:20

## 2018-04-30 NOTE — CONSULT NOTE ADULT - ATTENDING COMMENTS
Thank you. Case discussed with ICU team.     Ayaka Davis M.D, F.A.C.C  [facility]   [phone]

## 2018-04-30 NOTE — CONSULT NOTE ADULT - PROBLEM SELECTOR RECOMMENDATION 9
-patient was seen at 1130 this am, spoke with team about her sq regimen of NPH 9 units sq q8 and mod scale q4. Based on her current status, elevated bs since gtt being stopped and ESRD status, the patient should be restarted on the gtt. If her bs goes below 250, they can run D5W at 30cc/hour as she has CHF and ESRD.  -defer insulin pump while pt requires critical care. The pump was suspended and the patient's  has it.

## 2018-04-30 NOTE — PROGRESS NOTE ADULT - ATTENDING COMMENTS
Agree with above. Patient seen and examined. Patient critically ill and requiring MICU care.  -DKA - endocrine evaluation. Repeat BMP. Acetone negative and pt was converted to NPH this AM but may require return to Insulin gtt given elevated AG. Continue insulin replacement. Patient not eating due to abdominal pain. Continue to monitor AG and fingersticks  -ESRD - on HD - nephrology evaluation appreciated. To be dialyzed after CT today to aid in volume removal  -Abdominal pain - Left sided - with possible prior hematemesis but stable H/H. Will check CT abd/pelvis with IV contrast. LFts not significantly elevated. Lactate trending down. Continue NPO for now. Given rising WBC, which may be related to NSTEMI vs infection, will start antibiotics  -CAD - cardiology evaluation noted - plan for cath with Dr. Vela tomorrow once patients volume status improved. Continue to monitor CE - trop 11 and ST depressions on EKG. Followup official echo  -PVD and subclavian stenosis     -Prognosis guarded  Critical Care Time 35 minutes

## 2018-04-30 NOTE — PROGRESS NOTE ADULT - SUBJECTIVE AND OBJECTIVE BOX
Patient is a 62y old  Female who presents with a chief complaint of DKA/ Hypotension (29 Apr 2018 15:00)      SUBJECTIVE / OVERNIGHT EVENTS: weak, tired. + abdominal discomfort.  Review of Systems  chest pain no  palpitations no  sob no  nausea no  headache no    MEDICATIONS  (STANDING):  aspirin  chewable 81 milliGRAM(s) Oral daily  atorvastatin 80 milliGRAM(s) Oral at bedtime  clopidogrel Tablet 75 milliGRAM(s) Oral daily  dextrose 5% + sodium chloride 0.45%. 1000 milliLiter(s) (15 mL/Hr) IV Continuous <Continuous>  heparin  Infusion.  Unit(s)/Hr (6.5 mL/Hr) IV Continuous <Continuous>  insulin Infusion 1 Unit(s)/Hr (1 mL/Hr) IV Continuous <Continuous>  metoprolol succinate ER 25 milliGRAM(s) Oral daily  pantoprazole  Injectable 40 milliGRAM(s) IV Push every 12 hours  piperacillin/tazobactam IVPB. 3.375 Gram(s) IV Intermittent every 12 hours    MEDICATIONS  (PRN):          PHYSICAL EXAM:  GENERAL: NAD, well-developed  HEAD:  Atraumatic, Normocephalic  EYES: EOMI, PERRLA, conjunctiva and sclera clear  NECK: Supple, No JVD  CHEST/LUNG: Clear to auscultation bilaterally; No wheeze  HEART: Regular rate and rhythm; No murmurs, rubs, or gallops  ABDOMEN: Soft, tender RUQ, Nondistended; Bowel sounds present  EXTREMITIES:  2+ Peripheral Pulses, No clubbing, cyanosis, or edema  NEUROLOGY: non-focal  SKIN: No rashes or lesions    LABS:                        10.9   18.4  )-----------( 283      ( 30 Apr 2018 15:37 )             31.7     04-30    127<L>  |  83<L>  |  63<H>  ----------------------------<  225<H>  >9.0<HH>   |  21<L>  |  7.13<H>    Ca    8.2<L>      30 Apr 2018 15:37  Phos  7.6     04-30  Mg     2.5     04-30    TPro  7.0  /  Alb  3.4  /  TBili  0.4  /  DBili  x   /  AST  153<H>  /  ALT  33  /  AlkPhos  92  04-30    PT/INR - ( 29 Apr 2018 16:01 )   PT: 12.9 sec;   INR: 1.19 ratio         PTT - ( 30 Apr 2018 15:37 )  PTT:30.0 sec  CARDIAC MARKERS ( 30 Apr 2018 08:14 )  x     / 11.36 ng/mL / 1377 U/L / x     / 88.7 ng/mL  CARDIAC MARKERS ( 30 Apr 2018 00:20 )  x     / 9.69 ng/mL / 1827 U/L / x     / 150.5 ng/mL  CARDIAC MARKERS ( 29 Apr 2018 20:42 )  x     / 6.87 ng/mL / 1506 U/L / x     / 138.5 ng/mL  CARDIAC MARKERS ( 29 Apr 2018 13:58 )  x     / 1.35 ng/mL / 512 U/L / x     / 34.9 ng/mL          RADIOLOGY & ADDITIONAL TESTS:    Imaging Personally Reviewed:    Consultant(s) Notes Reviewed:      Care Discussed with Consultants/Other Providers:

## 2018-04-30 NOTE — CONSULT NOTE ADULT - PROBLEM SELECTOR PROBLEM 4
Heart failure with reduced ejection fraction, NYHA class III Heart failure with reduced ejection fraction, NYHA class II

## 2018-04-30 NOTE — CONSULT NOTE ADULT - SUBJECTIVE AND OBJECTIVE BOX
CHIEF COMPLAINT:  diabetic ketoacidosis, abnormal EKG and abnormal cardiac enzymes  HISTORY OF PRESENT ILLNESS:  HPI:  62 yr old female with PMHx Insulin dependent DM (on insulin pump) with frequent hospitalization for DKA with last hospitalization from 2/28/18 to 3/3/18. PMHx also includes ESRD on HD (M/T/TH/Sa) last dialysis yesterday 4/28 and friday 4/27, HTN, HLD, HFrEF (40 to 45%), CAD, MI, s/p PCI RCA PVD s/p Lt and Rt fem pop bypasses, subclavian vein stenosis s/p stent, CVA.  Recent visit to PMD for upper respiratory symptoms without fever in which  endorses received antibx (unknown name or type) ,who now presents from home via EMS with altered mental status/weakness/hypotension and hyperglycemia, abd pain     Pt and  endorse pt has been experiencing N/V of dark brown bile x 4 to 6 episodes yesterday with coffee ground vomitus x2 in ambulance  as well as  4 to 5 episodes of watery stools yesterday. Pt on ASA and plavix. Furthermore  endorses that he noticed insulin pump not "clipped" to needle this morning. Glucose last evening  states was 160. In E.D. pt found to be hyperglycemic with glucose of 1302, HAGMA of 46, HCO3 8, Lactate of 11.6, VBG 7.09, sCr 6.05 (3.17 3/2018). ECG with 3mm ST elevation in V1-V2, trop 1.35 (in setting of ESRD), , MB 35.  SBP 70's. Pt received 1 liter NS, Regular insulin 5 units IVP, phenylephrine 0.1mg IVP x 1, HCO3 50 meq IVP x1, protonix 40 mg IVP, and started on insulin pump    patient has an abnormal EKG with ST depressions laterally and question of ST elevation in the early precordial leads. Enzymes are positive for acute MI pattern  Patient denies any chest pain or shortness of breath    PAST MEDICAL & SURGICAL HISTORY:  Subclavian vein stenosis, left: s/p stent  Cellulitis: 2015 left foot  DKA, type 1: 1/2015  ACS (acute coronary syndrome): 1/2015 - cath revealed 100% ostial stenosis not amenable to PCI - medical management  MI, old  TIA (transient ischemic attack): x 2 - 8-9 years ago prior to ASD/VSD repair  CAD (coronary artery disease): s/p stents  Murmur, cardiac  Gout: past  CVA (cerebral infarction): with no residual, 8 yrs ago, prior to heart surgery - ST memory loss  Peripheral vascular disease: occluded left fem-pop bypass 5/2015  Diabetes mellitus type 1: Insulin Dependent - [employer]  Minimed Paradigm Insulin Pump - Novolog  ESRD (end stage renal disease): dialysis , tue, thursday, saturday  Hyperlipidemia  Status post device closure of ASD: &quot;clamshell&quot;  History of cardiac catheterization: 1/2015 - no intervention  S/P femoral-popliteal bypass surgery: L and R in 2013 with graft; 5/2015 CFA angioplasty left and ileofemoral endarterectomywith vein patch angioplasty of left fem-pop bypass graft  Multiple vascular surgery both leg, left fempop bypass revision 11/2015  AV (arteriovenous fistula): Left AV graft; revision with stent placement 2-3 years ago  S/P cholecystectomy          MEDICATIONS:  aspirin  chewable 81 milliGRAM(s) Oral daily  clopidogrel Tablet 75 milliGRAM(s) Oral daily  heparin  Infusion.  Unit(s)/Hr IV Continuous <Continuous>  heparin  Injectable 3200 Unit(s) IV Push every 6 hours PRN  metoprolol succinate ER 25 milliGRAM(s) Oral daily    piperacillin/tazobactam IVPB. 3.375 Gram(s) IV Intermittent every 12 hours        pantoprazole  Injectable 40 milliGRAM(s) IV Push every 12 hours    atorvastatin 80 milliGRAM(s) Oral at bedtime  insulin Infusion 1 Unit(s)/Hr IV Continuous <Continuous>    dextrose 5% + sodium chloride 0.45%. 1000 milliLiter(s) IV Continuous <Continuous>      FAMILY HISTORY:  Family history of smoking  Family history of hypertension  Family history of cancer (Sibling)    Allergies    No Known Allergies    Intolerances    	    PHYSICAL EXAM:  T(C): 36.6 (04-30-18 @ 12:00), Max: 37.1 (04-29-18 @ 15:40)  HR: 81 (04-30-18 @ 12:00) (76 - 102)  BP: 97/46 (04-30-18 @ 12:00) (77/39 - 162/69)  RR: 22 (04-30-18 @ 12:00) (16 - 27)  SpO2: 96% (04-30-18 @ 12:00) (95% - 100%)  Wt(kg): --  I&O's Summary    29 Apr 2018 07:01  -  30 Apr 2018 07:00  --------------------------------------------------------  IN: 686 mL / OUT: 0 mL / NET: 686 mL    30 Apr 2018 07:01  -  30 Apr 2018 13:08  --------------------------------------------------------  IN: 93.5 mL / OUT: 0 mL / NET: 93.5 mL        Appearance: Normal lying flat appears depressed offers no complaints of chest pain or shortness of breath	  HEENT:   Normal oral mucosa, PERRL, EOMI	  Cardiovascular: Normal S1 S2, No JVD, No murmurs  Respiratory: Lungs expiratory rhonchi  diffuse  Psychiatry: A & O x 3, Mood & affect appropriate  Gastrointestinal:  left lower quadrant tenderness	  Extremities: No clubbing, cyanosis or edema  scars in the right femoral artery and left femoral artery 2+ pulse right femoral artery    TELEMETRY: 	    ECG:  	< from: 12 Lead ECG (04.29.18 @ 13:38) >  Diagnosis Line NORMAL SINUS RHYTHM  INCOMPLETE RIGHT BUNDLE BRANCH BLOCK  ST ELEVATION CONSIDER ANTERIOR INJURY OR ACUTE INFARCT  PROLONGED QT    < from: Xray Chest 1 View- PORTABLE-Urgent (04.29.18 @ 16:55) >  indings: Portable frontal view of the chest dated 4/29/2018 is compared   to 2/28/2018. Lungs are clear. There is no pleural effusion. Left   subclavian vascular stent is unchanged. Heart and mediastinum cannot be   evaluated.    Impression: Clear lungs.    	  LABS:	 	    CARDIAC MARKERS:  Troponin T, Serum: 11.36 ng/mL (04-30 @ 08:14)  Troponin T, Serum: 9.69 ng/mL (04-30 @ 00:20)  Troponin T, Serum: 6.87 ng/mL (04-29 @ 20:42)  Troponin T, Serum: 1.35 ng/mL (04-29 @ 13:58)                                  11.4   21.6  )-----------( 272      ( 30 Apr 2018 08:14 )             35.4     04-30    135  |  86<L>  |  60<H>  ----------------------------<  268<H>  5.5<H>   |  22  |  7.25<H>    Ca    8.3<L>      30 Apr 2018 11:59  Phos  6.8     04-30  Mg     2.2     04-30    TPro  6.9  /  Alb  4.0  /  TBili  0.4  /  DBili  x   /  AST  157<H>  /  ALT  35  /  AlkPhos  109  04-30    proBNP: Serum Pro-Brain Natriuretic Peptide: 79041 pg/mL (04-29 @ 13:58)
[facility]  [phone]  NEPHROLOGY      INITIAL CONSULT NOTE  --------------------------------------------------------------------------------  HPI:  62 yr old female with PMHx Insulin dependent DM (on insulin pump) with frequent hospitalization for DKA with last hospitalization from 2/28/18 to 3/3/18. PMHx also includes ESRD on HD (M/T/TH/Sa) last dialysis yesterday 4/28 and friday 4/27, HTN, HLD, HFrEF (40 to 45%), CAD, MI, s/p PCI RCA PVD s/p Lt and Rt fem pop bypasses, subclavian vein stenosis s/p stent, CVA.  Recent visit to PMD for upper respiratory symptoms without fever in which  endorses received antibx (unknown name or type) ,who now presents from home via EMS with altered mental status/weakness/hypotension and hyperglycemia, abd pain as documented Pt  endorsed pt has been experiencing N/V of dark brown bile x 4 to 6 episodes yesterday with coffee ground vomitus x2 in ambulance  as well as  4 to 5 episodes of watery stools yesterday. Pt on ASA and plavix. Furthermore, he noticed insulin pump not "clipped" to needle this morning. Glucose last evening  states was 160. In E.D. pt found to be hyperglycemic with glucose of 1302, HAGMA of 46, HCO3 8, Lactate of 11.6, VBG 7.09, sCr 6.05 (3.17 3/2018). ECG with 3mm ST elevation in V1-V2, trop 1.35 (in setting of ESRD), , MB 35.  SBP 70's. Pt received 1 liter NS, Regular insulin 5 units IVP, phenylephrine 0.1mg IVP x 1, HCO3 50 meq IVP x1, protonix 40 mg IVP, and started on insulin pump  pt transferred to icu. renal consult called.         PAST HISTORY  --------------------------------------------------------------------------------  PAST MEDICAL & SURGICAL HISTORY:  Subclavian vein stenosis, left: s/p stent  Cellulitis: 2015 left foot  DKA, type 1: 1/2015  ACS (acute coronary syndrome): 1/2015 - cath revealed 100% ostial stenosis not amenable to PCI - medical management  MI, old  TIA (transient ischemic attack): x 2 - 8-9 years ago prior to ASD/VSD repair  CAD (coronary artery disease): s/p stents  Murmur, cardiac  Gout: past  CVA (cerebral infarction): with no residual, 8 yrs ago, prior to heart surgery - ST memory loss  Peripheral vascular disease: occluded left fem-pop bypass 5/2015  Diabetes mellitus type 1: Insulin Dependent - [employer]  Minimed Paradigm Insulin Pump - Novolog  ESRD (end stage renal disease): dialysis , tue, thursday, saturday  Hyperlipidemia  Status post device closure of ASD: &quot;clamshell&quot;  History of cardiac catheterization: 1/2015 - no intervention  S/P femoral-popliteal bypass surgery: L and R in 2013 with graft; 5/2015 CFA angioplasty left and ileofemoral endarterectomywith vein patch angioplasty of left fem-pop bypass graft  Multiple vascular surgery both leg, left fempop bypass revision 11/2015  AV (arteriovenous fistula): Left AV graft; revision with stent placement 2-3 years ago  S/P cholecystectomy    FAMILY HISTORY:  Family history of smoking  Family history of hypertension  Family history of cancer (Sibling)    PAST SOCIAL HISTORY: past tobacco no alcohol       ALLERGIES & MEDICATIONS  --------------------------------------------------------------------------------  Allergies    No Known Allergies    Intolerances      Standing Inpatient Medications  calcium gluconate IVPB 2 Gram(s) IV Intermittent once  insulin Infusion 3 Unit(s)/Hr IV Continuous <Continuous>  pantoprazole  Injectable 40 milliGRAM(s) IV Push every 12 hours    PRN Inpatient Medications      REVIEW OF SYSTEMS  --------------------------------------------------------------------------------  Gen: No  fevers/chills . stated has been lethargic at home   Skin: No rashes  Head/Eyes/Ears/Mouth: No headache; Normal hearing;  No sinus pain/discomfort, sore throat  Respiratory: No dyspnea, cough, wheezing  CV: No chest pain, or palp   GI: No abdominal pain, diarrhea, + nausea, + vomiting, melena  : No dysuria  MSK: No joint pain/swelling; no back pain  Neuro:  dizziness/lightheadedness +   also with no edema     All other systems were reviewed and are negative, except as noted.    VITALS/PHYSICAL EXAM  --------------------------------------------------------------------------------  T(C): 36.8 (04-29-18 @ 19:00), Max: 37.1 (04-29-18 @ 15:40)  HR: 92 (04-29-18 @ 20:00) (76 - 98)  BP: 117/58 (04-29-18 @ 20:00) (77/39 - 117/58)  RR: 21 (04-29-18 @ 20:00) (20 - 27)  SpO2: 100% (04-29-18 @ 20:00) (95% - 100%)  Wt(kg): --  Height (cm): 160.02 (04-29-18 @ 15:40)  Weight (kg): 53 (04-29-18 @ 15:40)  BMI (kg/m2): 20.7 (04-29-18 @ 15:40)  BSA (m2): 1.54 (04-29-18 @ 15:40)      04-29-18 @ 07:01  -  04-29-18 @ 20:59  --------------------------------------------------------  IN: 246 mL / OUT: 0 mL / NET: 246 mL      Physical Exam:  	Gen: answers questions oriented to place person and events   	no jvd , supple neck,   	Pulm: decrease bs  no rales or ronchi or wheezing  	CV: RRR, S1S2; no rub  	Back: No CVA tenderness; no sacral edema  	Abd: +BS, soft, nontender/nondistended  	: No suprapubic tenderness  	UE: Warm, no cyanosis  no clubbing,  no edema; no asterixis  	LE: Warm, no cyanosis  no clubbing, no edema  	Neuro: oriented to person place and events   		Vascular access: + avf left + bruit and thrill     LABS/STUDIES  --------------------------------------------------------------------------------              10.7   16.0  >-----------<  277      [04-29-18 @ 16:01]              34.6     133  |  78  |  50  ----------------------------<  1057      [04-29-18 @ 16:01]  4.4   |  12  |  5.89        Ca     7.3     [04-29-18 @ 16:01]      Mg     2.5     [04-29-18 @ 16:01]      Phos  7.2     [04-29-18 @ 16:01]    TPro  6.9  /  Alb  4.2  /  TBili  0.4  /  DBili  x   /  AST  124  /  ALT  33  /  AlkPhos  113  [04-29-18 @ 16:01]    PT/INR: PT 12.9 , INR 1.19       [04-29-18 @ 16:01]  PTT: 25.9       [04-29-18 @ 16:01]    Troponin 1.35      [04-29-18 @ 13:58]        [04-29-18 @ 13:58]    Creatinine Trend:  SCr 5.89 [04-29 @ 16:01]  SCr 6.01 [04-29 @ 13:58]    Urinalysis - [06-04-17 @ 08:24]      Color Yellow / Appearance Clear / SG 1.013 / pH 8.5      Gluc 1000 / Ketone Negative  / Bili Negative / Urobili Negative       Blood Negative / Protein >600 / Leuk Est Small / Nitrite Negative      RBC 3-5 / WBC 6-10 / Hyaline  / Gran  / Sq Epi  / Non Sq Epi OCC / Bacteria       PTH -- (Ca 8.3)      [03-03-18 @ 08:53]   134  HbA1c 7.1      [03-01-18 @ 01:59]  TSH 1.07      [12-19-17 @ 06:12]    HBsAb <3.0      [02-28-18 @ 22:17]  HBsAb Nonreact      [05-02-15 @ 15:35]  HBsAg Nonreact      [02-28-18 @ 22:17]  HBcAb Nonreact      [02-28-18 @ 22:17]  HCV 0.13, Nonreact      [02-28-18 @ 22:17]
CARDIOLOGY CONSULTATION NOTE                                                                                             Consult requested by:  Dr. Kerrie Cuevas    Reason for Consultation: elevated troponin    History obtained by: Patient and medical record     obtained: No    Chief complaint:    Patient is a 62y old  Female who presents with a chief complaint of DKA/ Hypotension found to have elevated troponin today.  (29 Apr 2018 15:00)      HPI:  62 yr old female with PMHx Insulin dependent DM (on insulin pump) with frequent hospitalization for DKA with last hospitalization from 2/28/18 to 3/3/18. PMHx also includes ESRD on HD (M/T/TH/Sa) last dialysis yesterday 4/28 and friday 4/27, HTN, HLD, HFrEF (40 to 45%), CAD, MI, s/p PCI RCA PVD s/p Lt and Rt fem pop bypasses, subclavian vein stenosis s/p stent, CVA.  Recent visit to PMD for upper respiratory symptoms without fever in which  endorses received antibx (unknown name or type) ,who now presents from home via EMS with altered mental status/weakness/hypotension and hyperglycemia, abd pain   Pt and  endorse pt has been experiencing N/V of dark brown bile x 4 to 6 episodes yesterday with coffee ground vomitus x2 in ambulance  as well as  4 to 5 episodes of watery stools yesterday. Pt on ASA and plavix. Furthermore  endorses that he noticed insulin pump not "clipped" to needle this morning. Glucose last evening  states was 160. In E.D. pt found to be hyperglycemic with glucose of 1302, HAGMA of 46, HCO3 8, Lactate of 11.6, VBG 7.09, sCr 6.05 (3.17 3/2018). SBP 70's. Pt received 1 liter NS, Regular insulin 5 units IVP, phenylephrine 0.1mg IVP x 1, HCO3 50 meq IVP x1, protonix 40 mg IVP, and started on insulin pump. Found to have ST depression >2mm V4-V6 upon admission, troponin level 1.35, subsequent EKG ST depression resolved but continues to have inferior t wave inversions, troponin level elevated to 6.   Consult called and pt admitted to MICU for DKA (29 Apr 2018 15:00)    REVIEW OF SYMPTOMS: Cardiovascular:  See HPI. No chest pain,  No dyspnea,  No syncope,  No palpitations, No dizziness, No Orthopnea, No Paroxsymal nocturnal dyspnea  Respiratory:  No Dyspnea, No cough,     Genitourinary:  No dysuria, no hematuria  Gastrointestinal:  No nausea, no vomiting. No diarrhea.  + abdominal pain. No dark color stool, no melena   Neurological: No headache, no dizziness, no slurred speech  Psychiatric: No agitation, no anxiety.  ALL OTHER REVIEW OF SYSTEMS ARE NEGATIVE.    ALLERGIES:  No Known Allergies        CURRENT MEDICATIONS:  metoprolol tartrate Injectable 2.5 milliGRAM(s) IV Push every 6 hours     pantoprazole  Injectable  calcium gluconate IVPB  dextrose 5% + sodium chloride 0.45%.  heparin  Infusion.  heparin  Injectable  insulin Infusion      HOME MEDICATIONS:  metoprolol 25mg po bid  ASA 81mg po daily  plavix 75mg po daily  lipitor 20mg po daily  hydralazine 50mg po q6hrs    PAST MEDICAL HISTORY  Subclavian vein stenosis, left  Cellulitis  DKA, type 1  ACS (acute coronary syndrome)  MI (myocardial infarction)  MI, old  TIA (transient ischemic attack)  PAD (peripheral artery disease)  HLD (hyperlipidemia)  HTN (hypertension)  ESRD (end stage renal disease) on dialysis  Type 1 diabetes mellitus  CVA (cerebral vascular accident)  CAD (coronary artery disease)  Myocardial infarct  Murmur, cardiac  Gout  CVA (cerebral infarction)  Peripheral vascular disease  Coronary artery disease  Diabetes mellitus type 1  ESRD (end stage renal disease)  TIA (transient ischemic attack)  x 3 2005 2 nt to VSD/ASD repair  Diabetes mellitus type II  HTN (hypertension), benign  Hyperlipidemia      PAST SURGICAL HISTORY  Status post device closure of ASD  History of cardiac catheterization  S/P cholecystectomy  AV fistula  S/P femoral-popliteal bypass surgery  S/P femoral-popliteal bypass surgery  Stented coronary artery  AV (arteriovenous fistula)  S/P cholecystectomy  ASD OR VSD  Repair 2005      FAMILY HISTORY:  Family history of smoking  Family history of hypertension  Family history of cancer (Sibling)      SOCIAL HISTORY:  Denies smoking/alcohol/drugs      Vital Signs Last 24 Hrs  T(C): 36.9 (30 Apr 2018 00:00), Max: 37.1 (29 Apr 2018 15:40)  T(F): 98.5 (30 Apr 2018 00:00), Max: 98.7 (29 Apr 2018 15:40)  HR: 85 (30 Apr 2018 00:00) (76 - 102)  BP: 132/60 (30 Apr 2018 00:00) (77/39 - 148/64)  BP(mean): 86 (30 Apr 2018 00:00) (72 - 92)  RR: 21 (30 Apr 2018 00:00) (16 - 27)  SpO2: 98% (30 Apr 2018 00:00) (95% - 100%)      PHYSICAL EXAM:  Constitutional: Comfortable . No acute distress.   HEENT: Atraumatic and normcephalic , neck is supple . no JVD. No carotid bruit. PEERL   CNS: A&Ox3. No focal deficits. EOMI.   Lymph Nodes: Cervical : Not palpable.  Respiratory: CTAB  Cardiovascular: S1S2 RRR. No murmur/rubs or gallop.  Gastrointestinal: Soft non-tender and non distended . +Bowel sounds.   Extremities: No edema.   Psychiatric: Calm . no agitation.  Skin: No skin rash/ulcers visualized to face, hands or feet.    Intake and output:   04-29 @ 07:01  -  04-30 @ 01:28  --------------------------------------------------------  IN: 454 mL / OUT: 0 mL / NET: 454 mL        LABS:                        11.7   17.5  )-----------( 257      ( 30 Apr 2018 00:20 )             34.1     04-30    139  |  86<L>  |  54<H>  ----------------------------<  260<H>  4.7   |  25  |  6.41<H>    Ca    8.6      30 Apr 2018 00:20  Phos  6.3     04-30  Mg     2.4     04-30    TPro  7.1  /  Alb  4.3  /  TBili  0.3  /  DBili  x   /  AST  233<H>  /  ALT  39  /  AlkPhos  112  04-30    CARDIAC MARKERS ( 30 Apr 2018 00:20 )  x     / 9.69 ng/mL / 1827 U/L / x     / 150.5 ng/mL  CARDIAC MARKERS ( 29 Apr 2018 20:42 )  x     / 6.87 ng/mL / 1506 U/L / x     / 138.5 ng/mL  CARDIAC MARKERS ( 29 Apr 2018 13:58 )  x     / 1.35 ng/mL / 512 U/L / x     / 34.9 ng/mL    ;p-BNP=Serum Pro-Brain Natriuretic Peptide: 98148 pg/mL (04-29 @ 13:58)    PT/INR - ( 29 Apr 2018 16:01 )   PT: 12.9 sec;   INR: 1.19 ratio         PTT - ( 29 Apr 2018 16:01 )  PTT:25.9 sec      INTERPRETATION OF TELEMETRY: Reviewed by me.   ECG: Reviewed by me. first EKG, sinus rhythm, ST depression greater than 2 leads, AVR elevation  Second and third EKG, ST depression resolving, AVR resolved, inferior T wave inversion, anteroseptal infarct    RADIOLOGY & ADDITIONAL STUDIES:    X-ray:  reviewed by me.   < from: Xray Chest 1 View AP/PA (02.28.18 @ 18:15) >  FINDINGS: Clear lungs. No pneumothorax or pleural effusions. Heart size   is normal. Osseous structures intact. Left subclavian stent.    < end of copied text >    < from: Transthoracic Echocardiogram (11.27.17 @ 11:14) >  Conclusions:  1. Mitral annular calcification. Mild-moderate mitral  regurgitation.  2. Moderately dilated left atrium.  LA volume index = 45  cc/m2.  3. Mild concentric left ventricular hypertrophy.  4. Mild to moderate segmental left ventricular systolic  dysfunction.  Hypokinesis of the inferior wall, severe  hypokinesis of the inferolateral wall.  5. Right ventricular enlargement with low normal right  ventricular systolic function.  6. Estimated pulmonary artery systolic pressure equals 52  mm Hg, assuming right atrial pressure equals 8 mm Hg,  consistent with moderate pulmonary pressures.  *** Compared with echocardiogram of 7/24/2017, there has  been a decline in left ventricular systolic function.    < end of copied text >
HPI:  62 yr old female with PMHx Insulin dependent DM (on insulin pump) with frequent hospitalization for DKA with last hospitalization from 2/28/18 to 3/3/18. PMHx also includes ESRD on HD (M/T/TH/Sa) last dialysis yesterday 4/28 and friday 4/27, HTN, HLD, HFrEF (40 to 45%), CAD, MI, s/p PCI RCA PVD s/p Lt and Rt fem pop bypasses, subclavian vein stenosis s/p stent, CVA.  Recent visit to PMD for upper respiratory symptoms without fever in which  endorses received antibx (unknown name or type) ,who now presents from home via EMS with altered mental status/weakness/hypotension and hyperglycemia, abd pain     Pt and  endorse pt has been experiencing N/V of dark brown bile x 4 to 6 episodes yesterday with coffee ground vomitus x2 in ambulance  as well as  4 to 5 episodes of watery stools yesterday. Pt on ASA and plavix. Furthermore  endorses that he noticed insulin pump not "clipped" to needle this morning. Glucose last evening  states was 160. In E.D. pt found to be hyperglycemic with glucose of 1302, HAGMA of 46, HCO3 8, Lactate of 11.6, VBG 7.09, sCr 6.05 (3.17 3/2018). ECG with 3mm ST elevation in V1-V2, trop 1.35 (in setting of ESRD), , MB 35.  SBP 70's. Pt received 1 liter NS, Regular insulin 5 units IVP, phenylephrine 0.1mg IVP x 1, HCO3 50 meq IVP x1, protonix 40 mg IVP, and started on insulin pump    Consult called and pt admitted to MICU for DKA (29 Apr 2018 15:00)      PAST MEDICAL & SURGICAL HISTORY:  Subclavian vein stenosis, left: s/p stent  Cellulitis: 2015 left foot  DKA, type 1: 1/2015  ACS (acute coronary syndrome): 1/2015 - cath revealed 100% ostial stenosis not amenable to PCI - medical management  MI, old  TIA (transient ischemic attack): x 2 - 8-9 years ago prior to ASD/VSD repair  CAD (coronary artery disease): s/p stents  Murmur, cardiac  Gout: past  CVA (cerebral infarction): with no residual, 8 yrs ago, prior to heart surgery - ST memory loss  Peripheral vascular disease: occluded left fem-pop bypass 5/2015  Diabetes mellitus type 1: Insulin Dependent - [employer]  Minimed Paradigm Insulin Pump - Novolog  ESRD (end stage renal disease): dialysis , tue, thursday, saturday  Hyperlipidemia  Status post device closure of ASD: &quot;clamshell&quot;  History of cardiac catheterization: 1/2015 - no intervention  S/P femoral-popliteal bypass surgery: L and R in 2013 with graft; 5/2015 CFA angioplasty left and ileofemoral endarterectomywith vein patch angioplasty of left fem-pop bypass graft  Multiple vascular surgery both leg, left fempop bypass revision 11/2015  AV (arteriovenous fistula): Left AV graft; revision with stent placement 2-3 years ago  S/P cholecystectomy      Review of Systems: limited 2 to lethargy    Allergies    No Known Allergies    Intolerances        Social History:     FAMILY HISTORY:  Family history of smoking  Family history of hypertension  Family history of cancer (Sibling)      MEDICATIONS  (STANDING):  calcium gluconate IVPB 2 Gram(s) IV Intermittent once  insulin Infusion 3 Unit(s)/Hr (3 mL/Hr) IV Continuous <Continuous>  pantoprazole  Injectable 40 milliGRAM(s) IV Push every 12 hours    MEDICATIONS  (PRN):        CAPILLARY BLOOD GLUCOSE      POCT Blood Glucose.: 446 mg/dL (29 Apr 2018 20:09)  POCT Blood Glucose.: 511 mg/dL (29 Apr 2018 19:13)  POCT Blood Glucose.: >600 mg/dL (29 Apr 2018 18:06)  POCT Blood Glucose.: >600 mg/dL (29 Apr 2018 17:01)  POCT Blood Glucose.: >600 mg/dL (29 Apr 2018 15:51)  POCT Blood Glucose.: >600 mg/dL (29 Apr 2018 14:57)  POCT Blood Glucose.: >600 mg/dL (29 Apr 2018 13:23)  POCT Blood Glucose.: >600 mg/dL (29 Apr 2018 13:21)    I&O's Summary    29 Apr 2018 07:01  -  29 Apr 2018 21:01  --------------------------------------------------------  IN: 246 mL / OUT: 0 mL / NET: 246 mL        PHYSICAL EXAM:  GENERAL: NAD, lethargic , arroussable  HEAD:  Atraumatic, Normocephalic  EYES: EOMI, PERRLA, conjunctiva and sclera clear  NECK: Supple, No JVD  CHEST/LUNG: Clear to auscultation bilaterally; No wheeze  HEART: Regular rate and rhythm; No murmurs, rubs, or gallops  ABDOMEN: Soft, RUQ tender, Nondistended; Bowel sounds present  EXTREMITIES:  2+ Peripheral Pulses, No clubbing, cyanosis, or edema  NEUROLOGY: non-focal  SKIN: No rashes or lesions    LABS:                        10.7   16.0  )-----------( 277      ( 29 Apr 2018 16:01 )             34.6     04-29    133<L>  |  78<L>  |  50<H>  ----------------------------<  1057<HH>  4.4   |  12<L>  |  5.89<H>    Ca    7.3<L>      29 Apr 2018 16:01  Phos  7.2     04-29  Mg     2.5     04-29    TPro  6.9  /  Alb  4.2  /  TBili  0.4  /  DBili  x   /  AST  124<H>  /  ALT  33  /  AlkPhos  113  04-29    PT/INR - ( 29 Apr 2018 16:01 )   PT: 12.9 sec;   INR: 1.19 ratio         PTT - ( 29 Apr 2018 16:01 )  PTT:25.9 sec  CARDIAC MARKERS ( 29 Apr 2018 13:58 )  x     / 1.35 ng/mL / 512 U/L / x     / 34.9 ng/mL    EKG SR ST inv depressed in lat leads      RADIOLOGY & ADDITIONAL TESTS:    Imaging Personally Reviewed:    Consultant(s) Notes Reviewed:      Care Discussed with Consultants/Other Providers:
HPI: 61 yo female with T1DM uncontrolled with PVD s/p b/l fem-pop, CVA/TIA, CAD with MIx8 s/p PCI with stenting, and ESRD on HD here with 1 day of n/v of coffee ground/bilious fluid. The patient was drowsy when I saw her so history obtained from . He is currently sleeping in a different room due to his knee injury, so he awoke around 3/4am 1 day ago to her vomiting, but he did not see what it contained. Per EMT and the RN in the ED it was either coffee grounds or bilious. About a week ago the patient had been suffering with a URI - cough/rhinorrhea. Last Wednesday, she saw her PCP who started her on abx -? which one. Per her  she was better three days later. In fact she made dinner for the family: chicken burgers with cheese. No one else became sick. No sick contacts.   When the patient reached the ED her pump was noticed to be dislodged. Her  is uncertain when it happened. He notes that her bs yesterday am was in the 160s. She last changed her site Saturday. She currently uses the Medtronic 630 pump. her settings are:   Basal 12am 0.275 units/hour 5am 0.375 units/hour (total 8.5 units/day)  Bolus I:C 12am-12am 1:15 ISF 12am 60 7am 40 6pm 60 Target BS 12am 110-130, 8am 100-120. IOB: 6 hours.      PAST MEDICAL & SURGICAL HISTORY:  Subclavian vein stenosis, left: s/p stent  Cellulitis: 2015 left foot  DKA, type 1: 1/2015  ACS (acute coronary syndrome): 1/2015 - cath revealed 100% ostial stenosis not amenable to PCI - medical management  MI, old  TIA (transient ischemic attack): x 2 - 8-9 years ago prior to ASD/VSD repair  CAD (coronary artery disease): s/p stents  Murmur, cardiac  Gout: past  CVA (cerebral infarction): with no residual, 8 yrs ago, prior to heart surgery - ST memory loss  Peripheral vascular disease: occluded left fem-pop bypass 5/2015  Diabetes mellitus type 1: Insulin Dependent - Medtronic  Minimed Paradigm Insulin Pump - Novolog  ESRD (end stage renal disease): dialysis , tue, thursday, saturday  Hyperlipidemia  Status post device closure of ASD  History of cardiac catheterization: 1/2015 - no intervention  S/P femoral-popliteal bypass surgery: L and R in 2013 with graft; 5/2015 CFA angioplasty left and ileofemoral endarterectomy with vein patch angioplasty of left fem-pop bypass graft  Multiple vascular surgery both leg, left fem-pop bypass revision 11/2015  AV (arteriovenous fistula): Left AV graft; revision with stent placement 2-3 years ago  S/P cholecystectomy      FAMILY HISTORY:  Family history of smoking  Family history of hypertension  Family history of cancer (Sibling)  FH of DM: nephew    Social History:  Tobacco: 1ppd x 40 years, quit 20 years ago  Occupation: on disability    Outpatient Medications: Novolog per pump    MEDICATIONS  (STANDING):  aspirin  chewable 81 milliGRAM(s) Oral daily  atorvastatin 80 milliGRAM(s) Oral at bedtime  clopidogrel Tablet 75 milliGRAM(s) Oral daily  dextrose 5% + sodium chloride 0.45%. 1000 milliLiter(s) (10 mL/Hr) IV Continuous <Continuous>  dextrose 5% + sodium chloride 0.45%. 1000 milliLiter(s) (15 mL/Hr) IV Continuous <Continuous>  heparin  Infusion.  Unit(s)/Hr (6.5 mL/Hr) IV Continuous <Continuous>  insulin Infusion 1 Unit(s)/Hr (1 mL/Hr) IV Continuous <Continuous>  metoprolol succinate ER 25 milliGRAM(s) Oral daily  pantoprazole  Injectable 40 milliGRAM(s) IV Push every 12 hours  piperacillin/tazobactam IVPB. 3.375 Gram(s) IV Intermittent every 12 hours    MEDICATIONS  (PRN):  heparin  Injectable 3200 Unit(s) IV Push every 6 hours PRN For aPTT less than 40      Allergies  No Known Allergies      Review of Systems:  Constitutional: No fever/chills  Neuro: No headache, paresthesias L foot greater than R foot  Cardiovascular: No chest pain, palpitations  Respiratory: No SOB, no cough  GI: +abd pain, nausea/vomiting/diarrhea   : +anuria  MS: no joint/muscle pain  ALL OTHER SYSTEMS REVIEWED AND NEGATIVE    PHYSICAL EXAM:  VITALS: T(C): 36.6 (04-30-18 @ 12:00)  T(F): 97.9 (04-30-18 @ 12:00), Max: 98.7 (04-29-18 @ 15:40)  HR: 78 (04-30-18 @ 15:00) (77 - 102)  BP: 102/51 (04-30-18 @ 15:00) (87/45 - 162/69)  RR:  (16 - 26)  SpO2:  (95% - 100%)  Wt(kg): --  GENERAL: NAD, well-groomed, well-developed  EYES: No proptosis, anicteric  HEENT:  Atraumatic, Normocephalic, +dry mucous membranes  RESPIRATORY: +coarse bs on chest b/l  CARDIOVASCULAR: Regular rate and rhythm; No murmurs  GI: Soft, nontender, non distended, normal bowel sounds  SKIN: Dry, intact, No rashes or lesions on feet b/l  NEURO: no tremor  PSYCH: +restrictive affect, +drowsy mood    POCT Blood Glucose.: 234 mg/dL (04-30-18 @ 15:16)  POCT Blood Glucose.: 182 mg/dL (04-30-18 @ 13:53)  POCT Blood Glucose.: 220 mg/dL (04-30-18 @ 12:57)  POCT Blood Glucose.: 250 mg/dL (04-30-18 @ 11:04)  POCT Blood Glucose.: 247 mg/dL (04-30-18 @ 11:02)  POCT Blood Glucose.: 171 mg/dL (04-30-18 @ 09:51)  POCT Blood Glucose.: 79 mg/dL (04-30-18 @ 09:11)  POCT Blood Glucose.: 161 mg/dL (04-30-18 @ 07:55)  POCT Blood Glucose.: 233 mg/dL (04-30-18 @ 06:54)  POCT Blood Glucose.: 222 mg/dL (04-30-18 @ 06:04)  POCT Blood Glucose.: 206 mg/dL (04-30-18 @ 04:57)  POCT Blood Glucose.: 236 mg/dL (04-30-18 @ 04:14)  POCT Blood Glucose.: 292 mg/dL (04-30-18 @ 02:55)  POCT Blood Glucose.: 263 mg/dL (04-30-18 @ 02:16)  POCT Blood Glucose.: 281 mg/dL (04-30-18 @ 01:11)  POCT Blood Glucose.: 244 mg/dL (04-30-18 @ 00:02)  POCT Blood Glucose.: 268 mg/dL (04-29-18 @ 23:02)  POCT Blood Glucose.: 340 mg/dL (04-29-18 @ 22:02)  POCT Blood Glucose.: 384 mg/dL (04-29-18 @ 21:07)  POCT Blood Glucose.: 446 mg/dL (04-29-18 @ 20:09)  POCT Blood Glucose.: 511 mg/dL (04-29-18 @ 19:13)  POCT Blood Glucose.: >600 mg/dL (04-29-18 @ 18:06)  POCT Blood Glucose.: >600 mg/dL (04-29-18 @ 17:01)  POCT Blood Glucose.: >600 mg/dL (04-29-18 @ 15:51)  POCT Blood Glucose.: >600 mg/dL (04-29-18 @ 14:57)  POCT Blood Glucose.: >600 mg/dL (04-29-18 @ 13:23)  POCT Blood Glucose.: >600 mg/dL (04-29-18 @ 13:21)                            11.4   21.6  )-----------( 272      ( 30 Apr 2018 08:14 )             35.4       04-30    135  |  86<L>  |  60<H>  ----------------------------<  268<H>  5.5<H>   |  22  |  7.25<H>    EGFR if : 6<L>  EGFR if non : 6<L>    Ca    8.3<L>      04-30  Mg     2.2     04-30  Phos  6.8     04-30    TPro  6.9  /  Alb  4.0  /  TBili  0.4  /  DBili  x   /  AST  157<H>  /  ALT  35  /  AlkPhos  109  04-30        Hemoglobin A1C, Whole Blood: 7.1 % <H> [4.0 - 5.6] (03-01-18 @ 01:59)    04-30 Chol 113 LDL 37 HDL 59 Trig 86

## 2018-04-30 NOTE — CONSULT NOTE ADULT - PROBLEM SELECTOR RECOMMENDATION 9
Will pursue invasive strategy.  Cardiac cath in AM for evaluation of CAD and patency of previous PCI.   Start heparin drip weight based, continue ASA 81mg po daily, integrillin drip (patient cannot tolerate PO), metoprolol 2.5mg IV q6hrs. Lipitor 80mg po daily once able to tolerate oral intake. Pursue invasive strategy. Cardiac cath in am for evaluation of progression of CAD and patency of stents.  NGT will be placed. Start heparin drip weight based, continue ASA 81mg po daily, plavix 75mg po daily, metoprolol xl 25mg po daily, lipitor 80mg po daily.  EKG in AM.   TTE in AM for evaluation of LV function. Patient was not optimized on heart failure therapy.

## 2018-04-30 NOTE — PROGRESS NOTE ADULT - ASSESSMENT
Plan:  #Neuro:  - neuro checks q 2 hrs and prn changes  -oob to chair as tolerated  -physical therapy consult when stable    #Pulm:  -supplemental O2 to maintain spo2>92%  -incentive spirometry 10x q2 hts  -when in bed maintain HOB>/= 30 degree angle    #CV:  -ecg this a.m. and q a.m. x 3  -continue to trend cardiac enzyme q 8 hrs  -continue metoprolol XL 25 mg qd  -pt this a.m with runs of sinus tach/SVT (will give metoprolol 2.5 mg IVP prn to maintain HR 60 to 100)  -home meds include hydralazine, will hold med at present  -will consider adding ACE-I   -plan for cardiac cath today- will f/u with cardiology  -pending TTE    #GI/:  -manitain NPO except for meds  -continue protonix 40 mg IVP q 12 hrs  -f/u abd ultrasound to eval for pathology (liver/pancreas)  -consider CT abd/pelvis to r/o pathology  -if continues with diarrhea will send c-diff  -f/u with renal regarding HD (after cardiac cath)  -send stool for occult blood    #I.D.:  -f/u cultures  -at present without need for antibx therapy  -will reculture with fever spike    #FEN/HEME/ENDO:  -currently with ph >7/3; HCO3>15; AG improved however remains at 24 (with lactate of 3, sCr 6.4) will plan to convert to lantus or NPH /premeal/sliding scale insulin -   -maintain glucose 140 to 160  -continue with lytes/actone/VBG q 4 hrs  -f/u endo consult  -cmp/mg++/po--4/cbc/coags q a.m. and prn  -abg with lactate prn Plan:  #Neuro:  - neuro checks q 2 hrs and prn changes  -oob to chair as tolerated  -physical therapy consult when stable    #Pulm:  -supplemental O2 to maintain spo2>92%  -incentive spirometry 10x q2 hts  -when in bed maintain HOB>/= 30 degree angle    #CV:  -ecg this a.m. and q a.m. x 3  -continue to trend cardiac enzyme q 8 hrs  -continue metoprolol XL 25 mg qd  -pt this a.m with runs of sinus tach/SVT (will give metoprolol 2.5 mg IVP prn to maintain HR 60 to 100)  -home meds include hydralazine, will hold med at present  -will consider adding ACE-I   -plan for cardiac cath today- will f/u with cardiology  -pending TTE    #GI/:  -manitain NPO except for meds  -continue protonix 40 mg IVP q 12 hrs  -f/u abd ultrasound to eval for pathology (liver/pancreas)  -consider CT abd/pelvis to r/o pathology  -if continues with diarrhea will send c-diff  -f/u with renal regarding HD (after cardiac cath)  -send stool for occult blood    #I.D.:  -f/u cultures  -at present without need for antibx therapy  -will reculture with fever spike    #FEN/HEME/ENDO:  -currently with ph >7/3; HCO3>15; AG improved however remains at 24 (with lactate of 3, sCr 6.4) will plan to convert to lantus or NPH /premeal/sliding scale insulin -   -Calculated NPH will be 9 units q8hr subq, with 9 units lispro preprandial and lispro sliding scale premeals and qhs   -As pt is currently NPO, will forgo premeal and place on q4hr moderate sliding scale with NPH as above  -maintain glucose 140 to 160  -continue with lytes/actone/VBG q 4 hrs  -f/u endo consult  -cmp/mg++/po--4/cbc/coags q a.m. and prn  -abg with lactate prn

## 2018-04-30 NOTE — CHART NOTE - NSCHARTNOTEFT_GEN_A_CORE
62 yr old female with PMHx Insulin dependent DM (on insulin pump) with frequent hospitalization for DKA with last hospitalization from 2/28/18 to 3/3/18. PMHx also includes ESRD on HD (M/T/TH/Sa) last dialysis yesterday 4/28 and friday 4/27, HTN, HLD, HFrEF (40 to 45%), CAD, MI, s/p PCI RCA PVD s/p Lt and Rt fem pop bypasses, subclavian vein stenosis s/p stent, CVA, p/ from home with DKA ; admitted to MICU ; also with EKG changes ST depressions V3,V4,V5 and elevated CE . Pt denied to have chest pain at any point. She dose c/o abdominal discomfort, nausea,& vomiting. On deep palpation pt has rebound pain RUQ and LUQ. labs sent.   Called cards Dr. Peter Barron (who saw pt in past) however, advised to call Dr. Vela. Page placed to Dr. Vela (# [phone] given by Dr. Barron) however, no call back. MICU attending requested house cardiology team to see and evaluate pt STAT.  Case d/w cardiology fellow whom stated not able to see pt at this time due to his CCU rounds however, give a call to cardiology nocturnes Dr. Davis. Dr. Davis saw and evaluated pt. See cardiology consult.   MICu team w/ Dr. Bell,  agreed and pt was started on heparin gtt, restarted PO ASA, Plavix, statin as pt stated she will be able to take PO as her nausea feels better . Cardiac cath planned for AM. Pt spouse called at home and notified of plan . Will also place call to nephrology for HD post cardiac cath. 62 yr old female with PMHx Insulin dependent DM (on insulin pump) with frequent hospitalization for DKA with last hospitalization from 2/28/18 to 3/3/18. PMHx also includes ESRD on HD (M/T/TH/Sa) last dialysis yesterday 4/28 and friday 4/27, HTN, HLD, HFrEF (40 to 45%), CAD, MI, s/p PCI RCA PVD s/p Lt and Rt fem pop bypasses, subclavian vein stenosis s/p stent, CVA, p/ from home with DKA ; admitted to MICU ; also with EKG changes ST depressions V3,V4,V5 and elevated CE . Pt denied to have chest pain at any point. She dose c/o abdominal discomfort, nausea,& vomiting. On deep palpation pt has rebound pain RUQ and LUQ. labs sent; CE, CBC, CMP, LFT's, lipase, amylase, type and screen. Serial EKG. Telemetry monitoring. ICU monitoring .    Called cards Dr. Peter Barron (who saw pt in past) however, advised to call Dr. Vela. Page placed to Dr. Vela (# [phone] given by Dr. Barron) however, no call back. MICU attending requested house cardiology team to see and evaluate pt STAT.  Case d/w cardiology fellow whom stated not able to see pt at this time due to his CCU rounds however, give a call to cardiology nocturnes Dr. Davis. Dr. Davis saw and evaluated pt. See cardiology consult.   MICu team w/ Dr. Bell,  agreed and pt was started on heparin gtt, restarted PO ASA, Plavix, statin as pt stated she will be able to take PO as her nausea feels better . Cardiac cath planned for AM. Pt spouse called at home and notified of plan . Will also place call to nephrology for HD post cardiac

## 2018-04-30 NOTE — PROGRESS NOTE ADULT - SUBJECTIVE AND OBJECTIVE BOX
[facility]   [phone]  DIALYSIS NOTE  Chief Complaint: ESRD/Ongoing hemodialysis requirement.   24 hour events/subjective:    seen. still c/o fatigue. remains somewhat lethargic         ALLERGIES & MEDICATIONS  --------------------------------------------------------------------------------  Allergies    No Known Allergies    Intolerances      Standing Inpatient Medications  aspirin  chewable 81 milliGRAM(s) Oral daily  atorvastatin 80 milliGRAM(s) Oral at bedtime  clopidogrel Tablet 75 milliGRAM(s) Oral daily  dextrose 5% + sodium chloride 0.45%. 1000 milliLiter(s) IV Continuous <Continuous>  dextrose 5% + sodium chloride 0.45%. 1000 milliLiter(s) IV Continuous <Continuous>  heparin  Infusion.  Unit(s)/Hr IV Continuous <Continuous>  insulin Infusion 1 Unit(s)/Hr IV Continuous <Continuous>  metoprolol succinate ER 25 milliGRAM(s) Oral daily  pantoprazole  Injectable 40 milliGRAM(s) IV Push every 12 hours  piperacillin/tazobactam IVPB. 3.375 Gram(s) IV Intermittent every 12 hours    PRN Inpatient Medications  heparin  Injectable 3200 Unit(s) IV Push every 6 hours PRN      REVIEW OF SYSTEMS  --------------------------------------------------------------------------------  no itching or rash  no fever or chill  no cp or palp   no sob or cough   no N/V/D/ no abd pain   ext no edema no pain         VITALS/PHYSICAL EXAM  --------------------------------------------------------------------------------  T(C): 36.6 (04-30-18 @ 12:00), Max: 37.1 (04-29-18 @ 15:40)  HR: 79 (04-30-18 @ 14:00) (77 - 102)  BP: 97/52 (04-30-18 @ 14:00) (87/45 - 162/69)  RR: 18 (04-30-18 @ 14:00) (16 - 26)  SpO2: 96% (04-30-18 @ 14:00) (96% - 100%)  Wt(kg): --  Height (cm): 160.02 (04-29-18 @ 15:40)  Weight (kg): 53 (04-29-18 @ 15:40)  BMI (kg/m2): 20.7 (04-29-18 @ 15:40)  BSA (m2): 1.54 (04-29-18 @ 15:40)      04-29-18 @ 07:01  -  04-30-18 @ 07:00  --------------------------------------------------------  IN: 686 mL / OUT: 0 mL / NET: 686 mL    04-30-18 @ 07:01  -  04-30-18 @ 14:26  --------------------------------------------------------  IN: 93.5 mL / OUT: 0 mL / NET: 93.5 mL      Physical Exam:  		    	Gen: alert oriented place person and date   	Pulm: Decreased breath sounds b/l bases no rales or ronchi  	CV: RRR, S1/S2  	Abd: +BS, soft, nontender/nondistended  	Extremity: No cyanosis, no edema no clubbing  	Neuro: No focal deficits  	    LABS/STUDIES  --------------------------------------------------------------------------------              11.4   21.6  >-----------<  272      [04-30-18 @ 08:14]              35.4     135  |  86  |  60  ----------------------------<  268      [04-30-18 @ 11:59]  5.5   |  22  |  7.25        Ca     8.3     [04-30-18 @ 11:59]      Mg     2.2     [04-30-18 @ 11:59]      Phos  6.8     [04-30-18 @ 11:59]    TPro  6.9  /  Alb  4.0  /  TBili  0.4  /  DBili  x   /  AST  157  /  ALT  35  /  AlkPhos  109  [04-30-18 @ 08:14]    PT/INR: PT 12.9 , INR 1.19       [04-29-18 @ 16:01]  PTT: 21.2       [04-30-18 @ 08:14]    Troponin 11.36      [04-30-18 @ 08:14]  CK 1377      [04-30-18 @ 08:14]            imp/suggest: ESRD      Hemodialysis Prescription:  	Access: avf  	Dialyzer: revaclear 300   	Blood Flow (mL/Min): 400  	Dialysate Flow (mL/Min): 600  	Target UF (Liters): 2-2.5 liter   	Treatment Time: 3.5h   	Potassium:  2k   	Calcium: 2.5  	  YOLANDA    Vitamin D     continue with hd   see hd flow sheet

## 2018-04-30 NOTE — CONSULT NOTE ADULT - ASSESSMENT
The patient hasmultiple medical problems. Severe diabetes,end-stage renal disease on dialysis, multiple episodes of DKA,, peripheral vascular disease  status post femoropopliteal bypass, severe CAD with multiple procedures on the right coronary artery including brachial therapy with known total occlusion of a small circumflex and patent LAD.The patient presently was admitted with diabetic ketoacidosis nausea vomiting and abdominal pain but no chest pain.. Enzymes are compatible with an acute MI pattern and EKG has changed Last intervention was in November 2017, LV function relatively well preserved with inferior wall hypokinesia,, patent stents in the right coronary artery with no distal disease of the right coronary artery successfully stented.. No cardiac issues since then.She has had recurrent episodes of diabetic ketoacidosis    recommendations  1.As the patient is asymptomatic presently, no  urgent catheterization but patient will need to be restudied during this admission  2. Dialyze as per renal  3. Endocrine follow-up  4. Check results of 2-D echo preliminarily to my eyeLV function relatively well preserved  5. Rule out sepsis,, rule out intra-abdominal process    .When the patient is stable in terms of no infection,DKA, we will proceed with cardiac catheterization from the right femoral artery.    Mauro Vela M.D. [facility] [phone]
62 yr old female with PMHx Insulin dependent DM (on insulin pump) with frequent hospitalization for DKA with last hospitalization from 2/28/18 to 3/3/18. PMHx also includes ESRD on HD (M/T/TH/Sa) last dialysis yesterday 4/28 and friday 4/27, HTN, HLD, HFrEF (40 to 45%), CAD, MI, s/p PCI RCA PVD s/p Lt and Rt fem pop bypasses, subclavian vein stenosis s/p stent, CVA. with N/V of dark brown bile x 4 to 6 episodes yesterday with coffee ground vomitus x2 in ambulance. now with severe life threatening DKA as well as ekg changes. last known hd yesterday     1- esrd  2- MI  3- DKA  4- hx htn   5- hypotension in er    s/p ivf  correcting hyperglycemia as this is also correcting her acidosis   bp is better now.   troponin trend  insulin pump  consent for hd obtained witnessed and in chart  will hd if no improvement in her acidosis or situation worsens [holding off on hd given her ekg changes and suspected cardiac event as well]  d/w icu team
62 f with  DKA- IVF gentle, BS control, Endocrine evaluation  CAD- cardiac enzymes, Cardiology evaluation Dr. Vela  Coffee ground emesis- PPI, observe  US abdomen- r/o intraabdominal pathology  ESRD- Nephrology evaluation Dr. Schmidt for HD  ICU care  Further action as per clinical course   Pierce Viera MD pager [phone]
62 yr old female with PMHx Insulin dependent DM (on insulin pump) with frequent hospitalization for DKA with last hospitalization from 2/28/18 to 3/3/18. PMHx also includes ESRD on HD (M/T/TH/Sa) last dialysis yesterday 4/28 and friday 4/27, HTN, HLD, HFrEF (40 to 45%), CAD, MI, s/p PCI RCA PVD s/p Lt and Rt fem pop bypasses, subclavian vein stenosis s/p stent, CVA.
63 yo female with T1DM uncontrolled with PVD s/p b/l fem-pop, CVA/TIA, CAD with MIx8 s/p PCI with stenting, and ESRD on HD here with 1 day of n/v of coffee ground/bilious fluid found to be in DKA due to insulin pump dislodgement.
normal...

## 2018-04-30 NOTE — PROGRESS NOTE ADULT - SUBJECTIVE AND OBJECTIVE BOX
CHIEF COMPLAINT:  Patient is a 62y old  Female who presents with a chief complaint of DKA/ Hypotension (29 Apr 2018 15:00)    HPI:  62 yr old female with PMHx Insulin dependent DM (on insulin pump) with frequent hospitalization for DKA with last hospitalization from 2/28/18 to 3/3/18. PMHx also includes ESRD on HD (M/T/TH/Sa) last dialysis yesterday 4/28 and friday 4/27, HTN, HLD, HFrEF (40 to 45%), CAD, MI, s/p PCI RCA PVD s/p Lt and Rt fem pop bypasses, subclavian vein stenosis s/p stent, CVA.  Recent visit to PMD for upper respiratory symptoms without fever in which  endorses received antibx (unknown name or type) ,who now presents from home via EMS with altered mental status/weakness/hypotension and hyperglycemia, abd pain, accompanied by N/V(4 to 6 episodes) and diarrhea (4 to 5 episodes), "unclipped" to insulin pump day of admission . found to be in  DKA (1302) HAGMA (46) ,  NSTEMI.       Interval Events:  increasing Troponins, eval by cards - plan for cardiac cath. improvement in DKA, restarted on beta blockers, started on heparin gtt, projectile vomiting of dark bilious vomitus.      REVIEW OF SYSTEMS:  GI: [xx ] nausea [xx ] vomiting  [ xx] abd pain   [xx ] All other systems negative    OBJECTIVE:  ICU Vital Signs Last 24 Hrs  T(C): 36.9 (30 Apr 2018 04:00), Max: 37.1 (29 Apr 2018 15:40)  T(F): 98.4 (30 Apr 2018 04:00), Max: 98.7 (29 Apr 2018 15:40)  HR: 86 (30 Apr 2018 06:00) (76 - 102)  BP: 139/65 (30 Apr 2018 06:00) (77/39 - 148/64)  BP(mean): 93 (30 Apr 2018 06:00) (68 - 93)  ABP: --  ABP(mean): --  RR: 20 (30 Apr 2018 06:00) (16 - 27)  SpO2: 99% (30 Apr 2018 06:00) (95% - 100%)        04-29 @ 07:01  -  04-30 @ 06:40  --------------------------------------------------------  IN: 681 mL / OUT: 0 mL / NET: 681 mL      CAPILLARY BLOOD GLUCOSE      POCT Blood Glucose.: 222 mg/dL (30 Apr 2018 06:04)      PHYSICAL EXAM:  Neuro:    Pulm:    C/V:    GI/:    Lymph:    Neck:    LINES:    HOSPITAL MEDICATIONS:  MEDICATIONS  (STANDING):  aspirin  chewable 81 milliGRAM(s) Oral daily  atorvastatin 80 milliGRAM(s) Oral at bedtime  clopidogrel Tablet 75 milliGRAM(s) Oral daily  dextrose 5% + sodium chloride 0.45%. 1000 milliLiter(s) (20 mL/Hr) IV Continuous <Continuous>  heparin  Infusion.  Unit(s)/Hr (6.5 mL/Hr) IV Continuous <Continuous>  insulin Infusion 1 Unit(s)/Hr (1 mL/Hr) IV Continuous <Continuous>  metoprolol succinate ER 25 milliGRAM(s) Oral daily  pantoprazole  Injectable 40 milliGRAM(s) IV Push every 12 hours    MEDICATIONS  (PRN):  heparin  Injectable 3200 Unit(s) IV Push every 6 hours PRN For aPTT less than 40      LABS:                        11.7   17.5  )-----------( 257      ( 30 Apr 2018 00:20 )             34.1     Hgb Trend: 11.7<--, 10.7<--, 11.8<--  04-30    137  |  88<L>  |  56<H>  ----------------------------<  227<H>  4.6   |  25  |  6.71<H>    Ca    8.4      30 Apr 2018 04:20  Phos  6.6     04-30  Mg     2.3     04-30    TPro  6.7  /  Alb  3.9  /  TBili  0.4  /  DBili  x   /  AST  190<H>  /  ALT  37  /  AlkPhos  106  04-30    LIVER FUNCTIONS - ( 30 Apr 2018 04:20 )  Alb: 3.9 g/dL / Pro: 6.7 g/dL / ALK PHOS: 106 U/L / ALT: 37 U/L / AST: 190 U/L / GGT: x           Creatinine Trend: 6.71<--, 6.41<--, 6.31<--, 5.89<--, 6.01<--  PT/INR - ( 29 Apr 2018 16:01 )   PT: 12.9 sec;   INR: 1.19 ratio         PTT - ( 29 Apr 2018 16:01 )  PTT:25.9 sec    Arterial Blood Gas:  04-29 @ 16:00  7.23/36/54/15/77/-11.6  ABG lactate: --    Venous Blood Gas:  04-30 @ 04:17  7.41/42/72/26/94  VBG Lactate: 3.4  Venous Blood Gas:  04-30 @ 00:24  7.41/46/53/29/84  VBG Lactate: 3.7  Venous Blood Gas:  04-29 @ 20:14  7.38/42/45/24/75  VBG Lactate: 6.6  Venous Blood Gas:  04-29 @ 13:58  7.09/34/32/10/39  VBG Lactate: 11.6      MICROBIOLOGY:     RADIOLOGY:  [ ] Reviewed and interpreted by me    EKG:      [facility] (ext [phone]) CHIEF COMPLAINT:  Patient is a 62y old  Female who presents with a chief complaint of DKA/ Hypotension (29 Apr 2018 15:00)    HPI:  62 yr old female with PMHx Insulin dependent DM (on insulin pump) with frequent hospitalization for DKA with last hospitalization from 2/28/18 to 3/3/18. PMHx also includes ESRD on HD (M/T/TH/Sa) last dialysis yesterday 4/28 and friday 4/27, HTN, HLD, HFrEF (40 to 45%), CAD, MI, s/p PCI RCA PVD s/p Lt and Rt fem pop bypasses, subclavian vein stenosis s/p stent, CVA.  Recent visit to PMD for upper respiratory symptoms without fever in which  endorses received antibx (unknown name or type) ,who now presents from home via EMS with altered mental status/weakness/hypotension and hyperglycemia, abd pain, accompanied by N/V(4 to 6 episodes) and diarrhea (4 to 5 episodes), "unclipped" to insulin pump day of admission . found to be in  DKA (1302) HAGMA (46) ,  NSTEMI.       Interval Events:  increasing Troponins, eval by cards - plan for cardiac cath. improvement in DKA, restarted on beta blockers, started on heparin gtt, projectile vomiting of dark bilious vomitus.      REVIEW OF SYSTEMS:  GI: [xx ] nausea [xx ] vomiting  [ xx] abd pain   [xx ] All other systems negative    OBJECTIVE:  ICU Vital Signs Last 24 Hrs  T(C): 36.9 (30 Apr 2018 04:00), Max: 37.1 (29 Apr 2018 15:40)  T(F): 98.4 (30 Apr 2018 04:00), Max: 98.7 (29 Apr 2018 15:40)  HR: 86 (30 Apr 2018 06:00) (76 - 102)  BP: 139/65 (30 Apr 2018 06:00) (77/39 - 148/64)  BP(mean): 93 (30 Apr 2018 06:00) (68 - 93)  ABP: --  ABP(mean): --  RR: 20 (30 Apr 2018 06:00) (16 - 27)  SpO2: 99% (30 Apr 2018 06:00) (95% - 100%)        04-29 @ 07:01  -  04-30 @ 06:40  --------------------------------------------------------  IN: 681 mL / OUT: 0 mL / NET: 681 mL      CAPILLARY BLOOD GLUCOSE      POCT Blood Glucose.: 222 mg/dL (30 Apr 2018 06:04)      PHYSICAL EXAM:  Neuro:  A&Ox4, follows commands, responds to directions appropriately. PERRLA intact, pupils 3mm brisk and reactive, no focal deficits. CN intact.  strength and dorsiflexion/plantar flexion 5/5 bilaterally UE/LE.    Pulm: Lungs clear on auscultation, no adventitious sounds noted. POCUS revealing A-lines predom, focal b lines anteriorly CHERI, no bronchograms noted.    C/V: S1S2, no murmur or bruit noted. POCUS revealing VTI 14cm, IVC 1.96cm. L arm AVF +/+. BLUE +1/+1 BLLE +1/+1    GI/: Symmetric contour of abdomen. Abd soft, no masses palpable, nondistended. +Profound Tenderness LLQ and epigastric regions. Hypoactive BS on auscultation.   - Pt on HD (M-T-Th-Sat last HD 4/28); Anuric    Lymph: No lymphadenopathy, nonpalpable, nontender.    Neck: Neck supple, no JVD noted, no thromegaly or nodules    Skin: Intact, warm to touch. No lesions or rash noted. AVF site intact, no drainage/redness noted.    Psychiatric: Affect and behavior appropriate to situation.    LINES:    HOSPITAL MEDICATIONS:  MEDICATIONS  (STANDING):  aspirin  chewable 81 milliGRAM(s) Oral daily  atorvastatin 80 milliGRAM(s) Oral at bedtime  clopidogrel Tablet 75 milliGRAM(s) Oral daily  dextrose 5% + sodium chloride 0.45%. 1000 milliLiter(s) (20 mL/Hr) IV Continuous <Continuous>  heparin  Infusion.  Unit(s)/Hr (6.5 mL/Hr) IV Continuous <Continuous>  insulin Infusion 1 Unit(s)/Hr (1 mL/Hr) IV Continuous <Continuous>  metoprolol succinate ER 25 milliGRAM(s) Oral daily  pantoprazole  Injectable 40 milliGRAM(s) IV Push every 12 hours    MEDICATIONS  (PRN):  heparin  Injectable 3200 Unit(s) IV Push every 6 hours PRN For aPTT less than 40      LABS:                        11.7   17.5  )-----------( 257      ( 30 Apr 2018 00:20 )             34.1     Hgb Trend: 11.7<--, 10.7<--, 11.8<--  04-30    137  |  88<L>  |  56<H>  ----------------------------<  227<H>  4.6   |  25  |  6.71<H>    Ca    8.4      30 Apr 2018 04:20  Phos  6.6     04-30  Mg     2.3     04-30    TPro  6.7  /  Alb  3.9  /  TBili  0.4  /  DBili  x   /  AST  190<H>  /  ALT  37  /  AlkPhos  106  04-30    LIVER FUNCTIONS - ( 30 Apr 2018 04:20 )  Alb: 3.9 g/dL / Pro: 6.7 g/dL / ALK PHOS: 106 U/L / ALT: 37 U/L / AST: 190 U/L / GGT: x           Creatinine Trend: 6.71<--, 6.41<--, 6.31<--, 5.89<--, 6.01<--  PT/INR - ( 29 Apr 2018 16:01 )   PT: 12.9 sec;   INR: 1.19 ratio         PTT - ( 29 Apr 2018 16:01 )  PTT:25.9 sec    Arterial Blood Gas:  04-29 @ 16:00  7.23/36/54/15/77/-11.6  ABG lactate: --    Venous Blood Gas:  04-30 @ 04:17  7.41/42/72/26/94  VBG Lactate: 3.4  Venous Blood Gas:  04-30 @ 00:24  7.41/46/53/29/84  VBG Lactate: 3.7  Venous Blood Gas:  04-29 @ 20:14  7.38/42/45/24/75  VBG Lactate: 6.6  Venous Blood Gas:  04-29 @ 13:58  7.09/34/32/10/39  VBG Lactate: 11.6      MICROBIOLOGY:     RADIOLOGY:  [ ] Reviewed and interpreted by me    EKG:      Hailey [facility] (ext [phone])

## 2018-04-30 NOTE — CONSULT NOTE ADULT - PROBLEM SELECTOR RECOMMENDATION 4
Start lisinopril 5mg po daily along with metoprolol. Start lisinopril 5mg po daily along with metoprolol. Keep Mg>2.0, Mg>4.0.

## 2018-05-01 LAB
ACETONE SERPL-MCNC: ABNORMAL
ACETONE SERPL-MCNC: NEGATIVE — SIGNIFICANT CHANGE UP
ACETONE SERPL-MCNC: SIGNIFICANT CHANGE UP
ALBUMIN SERPL ELPH-MCNC: 4.2 G/DL — SIGNIFICANT CHANGE UP (ref 3.3–5)
ALBUMIN SERPL ELPH-MCNC: 4.4 G/DL — SIGNIFICANT CHANGE UP (ref 3.3–5)
ALP SERPL-CCNC: 82 U/L — SIGNIFICANT CHANGE UP (ref 40–120)
ALP SERPL-CCNC: 94 U/L — SIGNIFICANT CHANGE UP (ref 40–120)
ALT FLD-CCNC: 23 U/L — SIGNIFICANT CHANGE UP (ref 10–45)
ALT FLD-CCNC: 25 U/L — SIGNIFICANT CHANGE UP (ref 10–45)
ANION GAP SERPL CALC-SCNC: 18 MMOL/L — HIGH (ref 5–17)
ANION GAP SERPL CALC-SCNC: 19 MMOL/L — HIGH (ref 5–17)
ANION GAP SERPL CALC-SCNC: 21 MMOL/L — HIGH (ref 5–17)
APTT BLD: 31.5 SEC — SIGNIFICANT CHANGE UP (ref 27.5–37.4)
APTT BLD: 42.7 SEC — HIGH (ref 27.5–37.4)
APTT BLD: 48.2 SEC — HIGH (ref 27.5–37.4)
AST SERPL-CCNC: 64 U/L — HIGH (ref 10–40)
AST SERPL-CCNC: 76 U/L — HIGH (ref 10–40)
B-OH-BUTYR SERPL-SCNC: 0.1 MMOL/L — SIGNIFICANT CHANGE UP
B-OH-BUTYR SERPL-SCNC: 0.1 MMOL/L — SIGNIFICANT CHANGE UP
B-OH-BUTYR SERPL-SCNC: 0.2 MMOL/L — SIGNIFICANT CHANGE UP
B-OH-BUTYR SERPL-SCNC: 0.4 MMOL/L — SIGNIFICANT CHANGE UP
B-OH-BUTYR SERPL-SCNC: 1.6 MMOL/L — HIGH
BILIRUB SERPL-MCNC: 0.4 MG/DL — SIGNIFICANT CHANGE UP (ref 0.2–1.2)
BILIRUB SERPL-MCNC: 0.4 MG/DL — SIGNIFICANT CHANGE UP (ref 0.2–1.2)
BUN SERPL-MCNC: 24 MG/DL — HIGH (ref 7–23)
BUN SERPL-MCNC: 27 MG/DL — HIGH (ref 7–23)
BUN SERPL-MCNC: 31 MG/DL — HIGH (ref 7–23)
BUN SERPL-MCNC: 36 MG/DL — HIGH (ref 7–23)
BUN SERPL-MCNC: 36 MG/DL — HIGH (ref 7–23)
CALCIUM SERPL-MCNC: 7.9 MG/DL — LOW (ref 8.4–10.5)
CALCIUM SERPL-MCNC: 8.2 MG/DL — LOW (ref 8.4–10.5)
CALCIUM SERPL-MCNC: 8.4 MG/DL — SIGNIFICANT CHANGE UP (ref 8.4–10.5)
CALCIUM SERPL-MCNC: 8.6 MG/DL — SIGNIFICANT CHANGE UP (ref 8.4–10.5)
CALCIUM SERPL-MCNC: 8.8 MG/DL — SIGNIFICANT CHANGE UP (ref 8.4–10.5)
CHLORIDE SERPL-SCNC: 91 MMOL/L — LOW (ref 96–108)
CHLORIDE SERPL-SCNC: 92 MMOL/L — LOW (ref 96–108)
CK MB BLD-MCNC: 3.9 % — HIGH (ref 0–3.5)
CK MB BLD-MCNC: 4.9 % — HIGH (ref 0–3.5)
CK MB CFR SERPL CALC: 19.6 NG/ML — HIGH (ref 0–3.8)
CK MB CFR SERPL CALC: 9.1 NG/ML — HIGH (ref 0–3.8)
CK SERPL-CCNC: 236 U/L — HIGH (ref 25–170)
CK SERPL-CCNC: 404 U/L — HIGH (ref 25–170)
CO2 SERPL-SCNC: 23 MMOL/L — SIGNIFICANT CHANGE UP (ref 22–31)
CO2 SERPL-SCNC: 25 MMOL/L — SIGNIFICANT CHANGE UP (ref 22–31)
CO2 SERPL-SCNC: 25 MMOL/L — SIGNIFICANT CHANGE UP (ref 22–31)
CO2 SERPL-SCNC: 27 MMOL/L — SIGNIFICANT CHANGE UP (ref 22–31)
CO2 SERPL-SCNC: 27 MMOL/L — SIGNIFICANT CHANGE UP (ref 22–31)
CREAT SERPL-MCNC: 3.91 MG/DL — HIGH (ref 0.5–1.3)
CREAT SERPL-MCNC: 4.22 MG/DL — HIGH (ref 0.5–1.3)
CREAT SERPL-MCNC: 4.73 MG/DL — HIGH (ref 0.5–1.3)
CREAT SERPL-MCNC: 5 MG/DL — HIGH (ref 0.5–1.3)
CREAT SERPL-MCNC: 5.21 MG/DL — HIGH (ref 0.5–1.3)
GLUCOSE BLDC GLUCOMTR-MCNC: 131 MG/DL — HIGH (ref 70–99)
GLUCOSE BLDC GLUCOMTR-MCNC: 142 MG/DL — HIGH (ref 70–99)
GLUCOSE BLDC GLUCOMTR-MCNC: 145 MG/DL — HIGH (ref 70–99)
GLUCOSE BLDC GLUCOMTR-MCNC: 154 MG/DL — HIGH (ref 70–99)
GLUCOSE BLDC GLUCOMTR-MCNC: 157 MG/DL — HIGH (ref 70–99)
GLUCOSE BLDC GLUCOMTR-MCNC: 164 MG/DL — HIGH (ref 70–99)
GLUCOSE BLDC GLUCOMTR-MCNC: 184 MG/DL — HIGH (ref 70–99)
GLUCOSE BLDC GLUCOMTR-MCNC: 190 MG/DL — HIGH (ref 70–99)
GLUCOSE BLDC GLUCOMTR-MCNC: 190 MG/DL — HIGH (ref 70–99)
GLUCOSE BLDC GLUCOMTR-MCNC: 192 MG/DL — HIGH (ref 70–99)
GLUCOSE BLDC GLUCOMTR-MCNC: 197 MG/DL — HIGH (ref 70–99)
GLUCOSE BLDC GLUCOMTR-MCNC: 198 MG/DL — HIGH (ref 70–99)
GLUCOSE BLDC GLUCOMTR-MCNC: 199 MG/DL — HIGH (ref 70–99)
GLUCOSE BLDC GLUCOMTR-MCNC: 207 MG/DL — HIGH (ref 70–99)
GLUCOSE BLDC GLUCOMTR-MCNC: 211 MG/DL — HIGH (ref 70–99)
GLUCOSE BLDC GLUCOMTR-MCNC: 212 MG/DL — HIGH (ref 70–99)
GLUCOSE BLDC GLUCOMTR-MCNC: 219 MG/DL — HIGH (ref 70–99)
GLUCOSE BLDC GLUCOMTR-MCNC: 228 MG/DL — HIGH (ref 70–99)
GLUCOSE BLDC GLUCOMTR-MCNC: 243 MG/DL — HIGH (ref 70–99)
GLUCOSE BLDC GLUCOMTR-MCNC: 247 MG/DL — HIGH (ref 70–99)
GLUCOSE BLDC GLUCOMTR-MCNC: 257 MG/DL — HIGH (ref 70–99)
GLUCOSE BLDC GLUCOMTR-MCNC: 264 MG/DL — HIGH (ref 70–99)
GLUCOSE BLDC GLUCOMTR-MCNC: 324 MG/DL — HIGH (ref 70–99)
GLUCOSE SERPL-MCNC: 147 MG/DL — HIGH (ref 70–99)
GLUCOSE SERPL-MCNC: 200 MG/DL — HIGH (ref 70–99)
GLUCOSE SERPL-MCNC: 224 MG/DL — HIGH (ref 70–99)
GLUCOSE SERPL-MCNC: 246 MG/DL — HIGH (ref 70–99)
GLUCOSE SERPL-MCNC: 265 MG/DL — HIGH (ref 70–99)
HCT VFR BLD CALC: 32 % — LOW (ref 34.5–45)
HCT VFR BLD CALC: 33.6 % — LOW (ref 34.5–45)
HGB BLD-MCNC: 10.3 G/DL — LOW (ref 11.5–15.5)
HGB BLD-MCNC: 11.2 G/DL — LOW (ref 11.5–15.5)
INR BLD: 1.13 RATIO — SIGNIFICANT CHANGE UP (ref 0.88–1.16)
INR BLD: 1.24 RATIO — HIGH (ref 0.88–1.16)
MAGNESIUM SERPL-MCNC: 2.2 MG/DL — SIGNIFICANT CHANGE UP (ref 1.6–2.6)
MCHC RBC-ENTMCNC: 32.3 GM/DL — SIGNIFICANT CHANGE UP (ref 32–36)
MCHC RBC-ENTMCNC: 33 PG — SIGNIFICANT CHANGE UP (ref 27–34)
MCHC RBC-ENTMCNC: 33.3 GM/DL — SIGNIFICANT CHANGE UP (ref 32–36)
MCHC RBC-ENTMCNC: 34 PG — SIGNIFICANT CHANGE UP (ref 27–34)
MCV RBC AUTO: 102 FL — HIGH (ref 80–100)
MCV RBC AUTO: 102 FL — HIGH (ref 80–100)
PHOSPHATE SERPL-MCNC: 4.6 MG/DL — HIGH (ref 2.5–4.5)
PHOSPHATE SERPL-MCNC: 4.8 MG/DL — HIGH (ref 2.5–4.5)
PHOSPHATE SERPL-MCNC: 4.9 MG/DL — HIGH (ref 2.5–4.5)
PHOSPHATE SERPL-MCNC: 5 MG/DL — HIGH (ref 2.5–4.5)
PHOSPHATE SERPL-MCNC: 5 MG/DL — HIGH (ref 2.5–4.5)
PLATELET # BLD AUTO: 195 K/UL — SIGNIFICANT CHANGE UP (ref 150–400)
PLATELET # BLD AUTO: 215 K/UL — SIGNIFICANT CHANGE UP (ref 150–400)
POTASSIUM SERPL-MCNC: 4.1 MMOL/L — SIGNIFICANT CHANGE UP (ref 3.5–5.3)
POTASSIUM SERPL-MCNC: 4.2 MMOL/L — SIGNIFICANT CHANGE UP (ref 3.5–5.3)
POTASSIUM SERPL-MCNC: 4.4 MMOL/L — SIGNIFICANT CHANGE UP (ref 3.5–5.3)
POTASSIUM SERPL-MCNC: 4.5 MMOL/L — SIGNIFICANT CHANGE UP (ref 3.5–5.3)
POTASSIUM SERPL-MCNC: 4.5 MMOL/L — SIGNIFICANT CHANGE UP (ref 3.5–5.3)
POTASSIUM SERPL-SCNC: 4.1 MMOL/L — SIGNIFICANT CHANGE UP (ref 3.5–5.3)
POTASSIUM SERPL-SCNC: 4.2 MMOL/L — SIGNIFICANT CHANGE UP (ref 3.5–5.3)
POTASSIUM SERPL-SCNC: 4.4 MMOL/L — SIGNIFICANT CHANGE UP (ref 3.5–5.3)
POTASSIUM SERPL-SCNC: 4.5 MMOL/L — SIGNIFICANT CHANGE UP (ref 3.5–5.3)
POTASSIUM SERPL-SCNC: 4.5 MMOL/L — SIGNIFICANT CHANGE UP (ref 3.5–5.3)
PROT SERPL-MCNC: 6.9 G/DL — SIGNIFICANT CHANGE UP (ref 6–8.3)
PROT SERPL-MCNC: 7.1 G/DL — SIGNIFICANT CHANGE UP (ref 6–8.3)
PROTHROM AB SERPL-ACNC: 12.2 SEC — SIGNIFICANT CHANGE UP (ref 9.8–12.7)
PROTHROM AB SERPL-ACNC: 13.6 SEC — HIGH (ref 9.8–12.7)
RBC # BLD: 3.13 M/UL — LOW (ref 3.8–5.2)
RBC # BLD: 3.29 M/UL — LOW (ref 3.8–5.2)
RBC # FLD: 13 % — SIGNIFICANT CHANGE UP (ref 10.3–14.5)
RBC # FLD: 13.1 % — SIGNIFICANT CHANGE UP (ref 10.3–14.5)
SODIUM SERPL-SCNC: 135 MMOL/L — SIGNIFICANT CHANGE UP (ref 135–145)
SODIUM SERPL-SCNC: 136 MMOL/L — SIGNIFICANT CHANGE UP (ref 135–145)
SODIUM SERPL-SCNC: 136 MMOL/L — SIGNIFICANT CHANGE UP (ref 135–145)
TROPONIN T SERPL-MCNC: 8.27 NG/ML — HIGH (ref 0–0.06)
TROPONIN T SERPL-MCNC: 8.74 NG/ML — HIGH (ref 0–0.06)
WBC # BLD: 10.1 K/UL — SIGNIFICANT CHANGE UP (ref 3.8–10.5)
WBC # BLD: 9.7 K/UL — SIGNIFICANT CHANGE UP (ref 3.8–10.5)
WBC # FLD AUTO: 10.1 K/UL — SIGNIFICANT CHANGE UP (ref 3.8–10.5)
WBC # FLD AUTO: 9.7 K/UL — SIGNIFICANT CHANGE UP (ref 3.8–10.5)

## 2018-05-01 PROCEDURE — 99291 CRITICAL CARE FIRST HOUR: CPT

## 2018-05-01 PROCEDURE — 93010 ELECTROCARDIOGRAM REPORT: CPT

## 2018-05-01 PROCEDURE — 99233 SBSQ HOSP IP/OBS HIGH 50: CPT

## 2018-05-01 RX ORDER — SODIUM CHLORIDE 9 MG/ML
250 INJECTION INTRAMUSCULAR; INTRAVENOUS; SUBCUTANEOUS ONCE
Qty: 0 | Refills: 0 | Status: DISCONTINUED | OUTPATIENT
Start: 2018-05-01 | End: 2018-05-01

## 2018-05-01 RX ORDER — HEPARIN SODIUM 5000 [USP'U]/ML
1200 INJECTION INTRAVENOUS; SUBCUTANEOUS
Qty: 25000 | Refills: 0 | Status: DISCONTINUED | OUTPATIENT
Start: 2018-05-01 | End: 2018-05-02

## 2018-05-01 RX ORDER — NOREPINEPHRINE BITARTRATE/D5W 8 MG/250ML
0.01 PLASTIC BAG, INJECTION (ML) INTRAVENOUS
Qty: 8 | Refills: 0 | Status: DISCONTINUED | OUTPATIENT
Start: 2018-05-01 | End: 2018-05-01

## 2018-05-01 RX ORDER — MIDODRINE HYDROCHLORIDE 2.5 MG/1
10 TABLET ORAL ONCE
Qty: 0 | Refills: 0 | Status: COMPLETED | OUTPATIENT
Start: 2018-05-01 | End: 2018-05-01

## 2018-05-01 RX ORDER — ALBUMIN HUMAN 25 %
100 VIAL (ML) INTRAVENOUS ONCE
Qty: 0 | Refills: 0 | Status: COMPLETED | OUTPATIENT
Start: 2018-05-01 | End: 2018-05-01

## 2018-05-01 RX ORDER — PHENYLEPHRINE HYDROCHLORIDE 10 MG/ML
0.1 INJECTION INTRAVENOUS
Qty: 80 | Refills: 0 | Status: DISCONTINUED | OUTPATIENT
Start: 2018-05-01 | End: 2018-05-01

## 2018-05-01 RX ORDER — PHENYLEPHRINE HYDROCHLORIDE 10 MG/ML
0.02 INJECTION INTRAVENOUS
Qty: 80 | Refills: 0 | Status: DISCONTINUED | OUTPATIENT
Start: 2018-05-01 | End: 2018-05-02

## 2018-05-01 RX ADMIN — ATORVASTATIN CALCIUM 80 MILLIGRAM(S): 80 TABLET, FILM COATED ORAL at 21:07

## 2018-05-01 RX ADMIN — INSULIN HUMAN 0.5 UNIT(S)/HR: 100 INJECTION, SOLUTION SUBCUTANEOUS at 01:27

## 2018-05-01 RX ADMIN — PIPERACILLIN AND TAZOBACTAM 25 GRAM(S): 4; .5 INJECTION, POWDER, LYOPHILIZED, FOR SOLUTION INTRAVENOUS at 10:14

## 2018-05-01 RX ADMIN — PANTOPRAZOLE SODIUM 40 MILLIGRAM(S): 20 TABLET, DELAYED RELEASE ORAL at 14:18

## 2018-05-01 RX ADMIN — PIPERACILLIN AND TAZOBACTAM 25 GRAM(S): 4; .5 INJECTION, POWDER, LYOPHILIZED, FOR SOLUTION INTRAVENOUS at 21:07

## 2018-05-01 RX ADMIN — SODIUM CHLORIDE 25 MILLILITER(S): 9 INJECTION, SOLUTION INTRAVENOUS at 01:42

## 2018-05-01 RX ADMIN — PANTOPRAZOLE SODIUM 40 MILLIGRAM(S): 20 TABLET, DELAYED RELEASE ORAL at 01:55

## 2018-05-01 RX ADMIN — Medication 50 MILLILITER(S): at 00:19

## 2018-05-01 RX ADMIN — HEPARIN SODIUM 1200 UNIT(S)/HR: 5000 INJECTION INTRAVENOUS; SUBCUTANEOUS at 06:11

## 2018-05-01 RX ADMIN — MIDODRINE HYDROCHLORIDE 10 MILLIGRAM(S): 2.5 TABLET ORAL at 18:50

## 2018-05-01 RX ADMIN — Medication 50 MILLILITER(S): at 02:06

## 2018-05-01 RX ADMIN — HEPARIN SODIUM 1200 UNIT(S)/HR: 5000 INJECTION INTRAVENOUS; SUBCUTANEOUS at 21:07

## 2018-05-01 RX ADMIN — PHENYLEPHRINE HYDROCHLORIDE 0.4 MICROGRAM(S)/KG/MIN: 10 INJECTION INTRAVENOUS at 06:12

## 2018-05-01 NOTE — PROGRESS NOTE ADULT - ASSESSMENT
62 yr old female with PMHx Insulin dependent DM (on insulin pump) with frequent hospitalization for DKA with last hospitalization from 2/28/18 to 3/3/18. PMHx also includes ESRD on HD (M/T/TH/Sa) last dialysis yesterday 4/28 and friday 4/27, HTN, HLD, HFrEF (40 to 45%), CAD, MI, s/p PCI RCA PVD s/p Lt and Rt fem pop bypasses, subclavian vein stenosis s/p stent, CVA. with N/V of dark brown bile x 4 to 6 episodes yesterday with coffee ground vomitus x2 in ambulance. now with severe life threatening DKA as well as ekg changes.  1- esrd  2- MI  3- DKA/DM   4- hx htn       hd am  coronary angio today   bp is better.   hyperglycemia improving   continue with metoprolol er 25mg daily

## 2018-05-01 NOTE — PROGRESS NOTE ADULT - SUBJECTIVE AND OBJECTIVE BOX
[facility]   [phone]  RENAL FOLLOW UP NOTE  --------------------------------------------------------------------------------  Chief Complaint:    24 hour events/subjective:    seen in icu. states feels better     PAST HISTORY  --------------------------------------------------------------------------------  No significant changes to PMH, PSH, FHx, SHx, unless otherwise noted    ALLERGIES & MEDICATIONS  --------------------------------------------------------------------------------  Allergies    No Known Allergies    Intolerances      Standing Inpatient Medications  aspirin  chewable 81 milliGRAM(s) Oral daily  atorvastatin 80 milliGRAM(s) Oral at bedtime  clopidogrel Tablet 75 milliGRAM(s) Oral daily  dextrose 5% + sodium chloride 0.45%. 1000 milliLiter(s) IV Continuous <Continuous>  heparin  Infusion.  Unit(s)/Hr IV Continuous <Continuous>  insulin Infusion 1 Unit(s)/Hr IV Continuous <Continuous>  metoprolol succinate ER 25 milliGRAM(s) Oral daily  pantoprazole  Injectable 40 milliGRAM(s) IV Push every 12 hours  phenylephrine    Infusion 0.02 MICROgram(s)/kG/Min IV Continuous <Continuous>  piperacillin/tazobactam IVPB. 3.375 Gram(s) IV Intermittent every 12 hours    PRN Inpatient Medications      REVIEW OF SYSTEMS  --------------------------------------------------------------------------------    Gen: denies fevers/chills,  CVS: denies chest pain/palpitations  Resp: denies SOB/Cough  GI: Denies N/V/Abd pain  : Denies dysuria    All other systems were reviewed and are negative, except as noted.    VITALS/PHYSICAL EXAM  --------------------------------------------------------------------------------  T(C): 36.8 (05-01-18 @ 11:00), Max: 37.4 (05-01-18 @ 00:00)  HR: 71 (05-01-18 @ 11:00) (66 - 86)  BP: 101/55 (05-01-18 @ 11:00) (75/37 - 196/77)  RR: 13 (05-01-18 @ 11:00) (10 - 46)  SpO2: 98% (05-01-18 @ 11:00) (95% - 100%)  Wt(kg): --  Height (cm): 160.02 (05-01-18 @ 10:11)  Weight (kg): 53 (05-01-18 @ 10:11)  BMI (kg/m2): 20.7 (05-01-18 @ 10:11)  BSA (m2): 1.54 (05-01-18 @ 10:11)      04-30-18 @ 07:01  -  05-01-18 @ 07:00  --------------------------------------------------------  IN: 1860 mL / OUT: 2800 mL / NET: -940 mL    05-01-18 @ 07:01  -  05-01-18 @ 11:22  --------------------------------------------------------  IN: 251.5 mL / OUT: 0 mL / NET: 251.5 mL        Physical Exam:  	Gen: alert oriented but still remains lethargic overall thin   	Pulm: Decreased breath sounds b/l bases. no rales or ronchi or wheezing  	CV: RRR, S1/S2. no rub  	Back: No CVA tenderness; no sacral edema  	Abd: +BS, soft, nontender/nondistended  	: No suprapubic tenderness.               Extremity: No cyanosis, no edema no clubbing  	    LABS/STUDIES  --------------------------------------------------------------------------------              11.2   10.1  >-----------<  215      [05-01-18 @ 02:09]              33.6     136  |  91  |  31  ----------------------------<  200      [05-01-18 @ 09:58]  4.2   |  27  |  4.73        Ca     8.8     [05-01-18 @ 09:58]      Mg     2.2     [05-01-18 @ 09:58]      Phos  5.0     [05-01-18 @ 09:58]    TPro  6.9  /  Alb  4.2  /  TBili  0.4  /  DBili  x   /  AST  64  /  ALT  23  /  AlkPhos  82  [05-01-18 @ 05:48]    PT/INR: PT 13.6 , INR 1.24       [05-01-18 @ 02:09]  PTT: 48.2       [05-01-18 @ 05:49]    Troponin 8.27      [05-01-18 @ 02:13]        [05-01-18 @ 02:13]    Creatinine Trend:  SCr 4.73 [05-01 @ 09:58]  SCr 4.22 [05-01 @ 05:48]  SCr 3.91 [05-01 @ 02:13]  SCr 3.66 [04-30 @ 22:21]  SCr 3.08 [04-30 @ 18:16]      PTH -- (Ca 8.3)      [03-03-18 @ 08:53]   134  HbA1c 7.1      [03-01-18 @ 01:59]  TSH 1.07      [12-19-17 @ 06:12]  Lipid: chol 113, TG 86, HDL 59, LDL 37      [04-30-18 @ 06:44]

## 2018-05-01 NOTE — PROGRESS NOTE ADULT - ASSESSMENT
63 yo female with T1DM uncontrolled with PVD s/p b/l fem-pop, CVA/TIA, CAD with MIx8 s/p PCI with stenting, and ESRD on HD here with 1 day of n/v of coffee ground/bilious fluid found to be in DKA due to insulin pump dislodgement.

## 2018-05-01 NOTE — DIETITIAN INITIAL EVALUATION ADULT. - NS AS NUTRI INTERV ED CONTENT
Attempted to provide pt with renal, Consistent CHO diet. Pt declined education at this time as she was very lethargic secondary to just coming back from a cardiac cath. Reviewed main points with pt: 1) increased protein needs, foods that are a good source of protein, 2) foods with potassium, phosphorus, sodium that should be limited, 3) consuming a consistent amount of CHO throughout the day, consuming protein with CHO,  4) encouraged good PO and protein intake. Offered pt nutrition education handouts, pt declined./Purpose of the nutrition education

## 2018-05-01 NOTE — DIETITIAN INITIAL EVALUATION ADULT. - NS AS NUTRI INTERV MEALS SNACK
Recommend to advance diet to Renal, Consistent CHO diet as medically feasible./General/healthful diet

## 2018-05-01 NOTE — PROGRESS NOTE ADULT - ATTENDING COMMENTS
Agree with above. Patient seen and examined. Patient critically ill and requiring MICU care.  -DKA - endocrine evaluation. Continue insulin drip. Endocrine followup and hopeful for transition to basal/bolus insulin after cath  -ESRD - on HD - had HD yesterday. Renal followup  -Abdominal pain - Left sided resolved - CT with CBD dilatation to 1.5cm but   -CAD - cardiology evaluation noted - plan for cath with Dr. Vela today. Continue to monitor CE - trop now downtrending. Mild segmental LV dysfunction on echo  -PVD and subclavian stenosis     -Prognosis guarded  Critical Care Time 35 minutes

## 2018-05-01 NOTE — PROGRESS NOTE ADULT - ASSESSMENT
62 f with  DKA- IVF gentle, BS control, Endocrine evaluation noted  CAD- cardiac enzymes,  Cardiology evaluation Dr. Vela noted. For cardiac cath.  Coffee ground emesis- PPI, observe  Antibiotics  ESRD- HD as per Nephrology  ICU care  Pierce Viera MD pager [phone]

## 2018-05-01 NOTE — DIETITIAN INITIAL EVALUATION ADULT. - PT NOT SOURCE
other (specify)/Pt just got back from cardiac cath, pt very lethargic at time of interview, pt able to answer basic questions.

## 2018-05-01 NOTE — DIETITIAN INITIAL EVALUATION ADULT. - ORAL INTAKE PTA
good/Pt reports good appetite PTA, however states that she finds it challenging to consume foods that are within her diet restrictions. Pt denies any micronutrient supplementation PTA.

## 2018-05-01 NOTE — DIETITIAN INITIAL EVALUATION ADULT. - ENERGY NEEDS
Ht: 63in, Wt: 116.8lbs (dosing), BMI: 20.7, IBW: 115lbs, IBW+/-10%=104-127lbs, %IBW:102  Skin: +1 dependent, generalized, head edema. Pt with no pressure ulcers noted at this time.

## 2018-05-01 NOTE — PROGRESS NOTE ADULT - SUBJECTIVE AND OBJECTIVE BOX
Patient is a 62y old  Female who presents with a chief complaint of DKA/ Hypotension (29 Apr 2018 15:00)      SUBJECTIVE / OVERNIGHT EVENTS: more awake.   Review of Systems  chest pain no  palpitations no  sob no  nausea no  headache no    MEDICATIONS  (STANDING):  aspirin  chewable 81 milliGRAM(s) Oral daily  atorvastatin 80 milliGRAM(s) Oral at bedtime  clopidogrel Tablet 75 milliGRAM(s) Oral daily  dextrose 5% + sodium chloride 0.45%. 1000 milliLiter(s) (25 mL/Hr) IV Continuous <Continuous>  heparin  Infusion.  Unit(s)/Hr (6.5 mL/Hr) IV Continuous <Continuous>  insulin Infusion 1 Unit(s)/Hr (1 mL/Hr) IV Continuous <Continuous>  metoprolol succinate ER 25 milliGRAM(s) Oral daily  pantoprazole  Injectable 40 milliGRAM(s) IV Push every 12 hours  phenylephrine    Infusion 0.02 MICROgram(s)/kG/Min (0.4 mL/Hr) IV Continuous <Continuous>  piperacillin/tazobactam IVPB. 3.375 Gram(s) IV Intermittent every 12 hours    MEDICATIONS  (PRN):          PHYSICAL EXAM:  GENERAL: NAD, well-developed  HEAD:  Atraumatic, Normocephalic  EYES: EOMI, PERRLA, conjunctiva and sclera clear  NECK: Supple, No JVD  CHEST/LUNG: Clear to auscultation bilaterally; No wheeze  HEART: Regular rate and rhythm; No murmurs, rubs, or gallops  ABDOMEN: Soft, less tender, Nondistended; Bowel sounds present  EXTREMITIES:  2+ Peripheral Pulses, No clubbing, cyanosis, or edema  PSYCH: Anxious  NEUROLOGY: non-focal  SKIN: No rashes or lesions    LABS:                        11.2   10.1  )-----------( 215      ( 01 May 2018 02:09 )             33.6     05-01    136  |  91<L>  |  31<H>  ----------------------------<  200<H>  4.2   |  27  |  4.73<H>    Ca    8.8      01 May 2018 09:58  Phos  5.0     05-01  Mg     2.2     05-01    TPro  6.9  /  Alb  4.2  /  TBili  0.4  /  DBili  x   /  AST  64<H>  /  ALT  23  /  AlkPhos  82  05-01    PT/INR - ( 01 May 2018 02:09 )   PT: 13.6 sec;   INR: 1.24 ratio         PTT - ( 01 May 2018 05:49 )  PTT:48.2 sec  CARDIAC MARKERS ( 01 May 2018 02:13 )  x     / 8.27 ng/mL / 404 U/L / x     / 19.6 ng/mL  CARDIAC MARKERS ( 30 Apr 2018 18:16 )  x     / 8.84 ng/mL / 846 U/L / x     / 49.1 ng/mL  CARDIAC MARKERS ( 30 Apr 2018 08:14 )  x     / 11.36 ng/mL / 1377 U/L / x     / 88.7 ng/mL  CARDIAC MARKERS ( 30 Apr 2018 00:20 )  x     / 9.69 ng/mL / 1827 U/L / x     / 150.5 ng/mL  CARDIAC MARKERS ( 29 Apr 2018 20:42 )  x     / 6.87 ng/mL / 1506 U/L / x     / 138.5 ng/mL  CARDIAC MARKERS ( 29 Apr 2018 13:58 )  x     / 1.35 ng/mL / 512 U/L / x     / 34.9 ng/mL          RADIOLOGY & ADDITIONAL TESTS:    Imaging Personally Reviewed:    Consultant(s) Notes Reviewed:      Care Discussed with Consultants/Other Providers:

## 2018-05-01 NOTE — CHART NOTE - NSCHARTNOTEFT_GEN_A_CORE
Removal of Femoral Sheath    Pulses in the lower extremity are audible with doppler. The patient was placed in the supine position. The insertion site was identified and the sutures were removed per protocol.  The _6___ [place] femoral sheath was then removed. Direct pressure was applied for  __20____ minutes.     Monitoring of the right groin and both lower extremities including neuro-vascular checks and vital signs every 15 minutes x 4, then every 30 minutes x 2, then every 1 hour was ordered.    Complications: None    Comments:  Activity restrictions and reportable symptoms discussed with patient

## 2018-05-01 NOTE — DIETITIAN INITIAL EVALUATION ADULT. - ADHERENCE
Pt reports following a renal and consistent CHO diet PTA. Pt reports that she checks her BG 6x/day, and is on an insulin pump. Pt has good knowledge of diet restrictions./fair

## 2018-05-01 NOTE — PROGRESS NOTE ADULT - ASSESSMENT
62 yr f with PMHx of insulin dependent DM, ESRD on HD, HFrEF, admitted with AMS, DKA, and GI Bleed also found to have ACS.     Neuro:  AMS - Now resolved probably secondary to Metabolic derangement                         If mental status deteriorates Stat Head CT                         continue with neuro checks q 4 per ICU protocol                         Pt/OT when medically appropriate     CV: ACS - f/u with cardiac enzymes EKG today will continue with DAP tx, statin, BB and heparin gtt                  Cardiology f/u appreciated - ? Cardiac Cath today          CHF - at this time no s/s of decompensated heart failure will continue with supplemental o2 as needed      Pulm: - No active pulmonary issues at this time     GI:  GI Bleed (+) coffee ground emesis on admission H/H remains stable at this time        continue with PPI BID        CBC q 12        Transfuse for Hg<7.5        will discuss with attending for possible GI consult       Transaminitis down trending will continue to trend      : CKD on HD - will f/u with nephrology possible cath today would try to schedule   HD post cath         will continue to trend electrolytes           Metabolic Acidosis - multifactorial DKA/ CKD continue with HD per nephrology - and Insulin gtt will continue to trend HCo3     Endo: DKA - AG this am 18 will continue with Insulin Gtt for now - Plan to resume Lantus once GAP closed and pt able to tolerate po and all procedures completed            Endo consult       ID: Blood cx (-) RVP (-) will continue to monitor CBC and VS       DVT PPX - now on Heparin gtt 62 yr f with PMHx of insulin dependent DM, ESRD on HD, HFrEF, admitted with AMS, DKA, and GI Bleed also found to have ACS.     Neuro:  AMS - Now resolved probably secondary to Metabolic derangement/ poor perfusion                         If mental status deteriorates Stat Head CT                         continue with neuro checks q 4 per ICU protocol                         Pt/OT when medically appropriate     CV: ACS - f/u with cardiac enzymes EKG today will continue with DAP tx, statin, BB and heparin gtt                  Cardiology f/u appreciated - ? Cardiac Cath today          CHF - at this time no s/s of decompensated heart failure will continue with supplemental o2 as needed           Shock- hypovolemic vs cardiogenic -continue vasopressor support infectious work up negative                   continue with bi hydration at this time                   TTE stat today / cardiac cath     Pulm: - No active pulmonary issues at this time     GI:  GI Bleed (+) coffee ground emesis on admission H/H remains stable at this time        continue with PPI BID        CBC q 12        Transfuse for Hg<7.5        will discuss with attending for possible GI consult       Transaminitis down trending will continue to trend      : CKD on HD - will f/u with nephrology possible cath today would try to schedule   HD post cath         will continue to trend electrolytes           Metabolic Acidosis - multifactorial DKA/ CKD continue with HD per nephrology - and Insulin gtt will continue to trend HCo3     Endo: DKA - AG this am 18 will continue with Insulin Gtt for now - Plan to resume Lantus once GAP closed and pt able to tolerate po and all procedures completed            Endo consult       ID: Blood cx (-) RVP (-) will continue to monitor CBC and VS       DVT PPX - now on Heparin gtt

## 2018-05-01 NOTE — DIETITIAN INITIAL EVALUATION ADULT. - PERTINENT LABORATORY DATA
5/1-Chloride 91, BUN 31, Creatinine 4.73, Glucose 200, Hemoglobin A1C 5.0; Fingersticks: 5/1: 142-212, 4/30: , 4/29: 268->600

## 2018-05-01 NOTE — DIETITIAN INITIAL EVALUATION ADULT. - OTHER INFO
Pt seen in MICU for LOS. Per chart 62 yearr old female with PMH Insulin dependent DM (on insulin pump) with frequent hospitalization for DKA with last hospitalization from 2/28/18 to 3/3/18. PMHx also includes ESRD on HD (M/T/TH/Sa) last dialysis yesterday 4/28 and friday 4/27, HTN, HLD, HFrEF (40 to 45%), CAD, MI, s/p PCI RCA PVD s/p Lt and Rt fem pop bypasses, subclavian vein stenosis s/p stent, CVA. Pt's enzymes are compatible with an acute MI pattern, s/p cardiac cath. Pt's current weight 108. 6lbs (5/11), 110.8lbs, 115.7lbs, 116.8lbs (4/30), 116.8lbs (dosing). Per previous RD notes pt's weight 119.2lbs (2/2/18), 116.0lbs (1/14/18), 121.4lbs (12/19/17). Pt reports UBW of 118lbs, denies major recent weight changes. Pt's weight fluctuations likely due to HD. Pt reports nausea, vomiting, diarrhea yesterday, has resolved as of today, +BM yesterday. Pt denies any chewing or swallowing difficulties. Pt with NKFA.

## 2018-05-01 NOTE — PROGRESS NOTE ADULT - SUBJECTIVE AND OBJECTIVE BOX
CHIEF COMPLAINT:  DKA ACS GI Bleed Metabolic acidosis     Interval Events:  62 yr f with PMHx of insulin dependent DM, ESRD on HD, HFrEF, frequent admissions for DKA, Presenting with AMS/Hypotension/hyperglycemic associated with coffee ground emesis and watery stools.  Found to have HAGMA, hyperglycemia of 1300. Admitted for DKA, GI Bleed also found to have ACS. No acute events over night. Insulin Gtt remains at .5units.  AG noted to be 18. Possible Cardiac cath today.          REVIEW OF SYSTEMS:  Constitutional: [ ] fevers [ ] chills [ ] weight loss [ ] weight gain  HEENT: [ ] dry eyes [ ] eye irritation [ ] postnasal drip [ ] nasal congestion  CV: [ ] chest pain [ ] orthopnea [ ] palpitations [ ] murmur  Resp: [ ] cough [ ] shortness of breath [ ] dyspnea [ ] wheezing [ ] sputum [ ] hemoptysis  GI: [ ] nausea [ ] vomiting [ ] diarrhea [ ] constipation [ ] abd pain [ ] dysphagia   : [ ] dysuria [ ] nocturia [ ] hematuria [ ] increased urinary frequency  Musculoskeletal: [ ] back pain [ ] myalgias [ ] arthralgias [ ] fracture  Skin: [ ] rash [ ] itch  Neurological: [ ] headache [ ] dizziness [ ] syncope [ ] weakness [ ] numbness  Psychiatric: [ ] anxiety [ ] depression  Endocrine: [ ] diabetes [ ] thyroid problem  Hematologic/Lymphatic: [ ] anemia [ ] bleeding problem  Allergic/Immunologic: [ ] itchy eyes [ ] nasal discharge [ ] hives [ ] angioedema  [ ] All other systems negative  [ ] Unable to assess ROS because ________    OBJECTIVE:  ICU Vital Signs Last 24 Hrs  T(C): 36.8 (01 May 2018 04:00), Max: 37.4 (01 May 2018 00:00)  T(F): 98.3 (01 May 2018 04:00), Max: 99.4 (01 May 2018 00:00)  HR: 74 (01 May 2018 05:00) (67 - 102)  BP: 97/51 (01 May 2018 05:00) (75/37 - 162/69)  BP(mean): 71 (01 May 2018 05:00) (51 - 99)  ABP: --  ABP(mean): --  RR: 16 (01 May 2018 05:00) (10 - 23)  SpO2: 98% (01 May 2018 05:00) (95% - 100%)        04-29 @ 07:01 - 04-30 @ 07:00  --------------------------------------------------------  IN: 686 mL / OUT: 0 mL / NET: 686 mL    04-30 @ 07:01 - 05-01 @ 05:39  --------------------------------------------------------  IN: 1823 mL / OUT: 2800 mL / NET: -977 mL      CAPILLARY BLOOD GLUCOSE      POCT Blood Glucose.: 207 mg/dL (01 May 2018 05:05)      PHYSICAL EXAM:  General:   HEENT:   Lymph Nodes:  Neck:   Respiratory:   Cardiovascular:   Abdomen:   Extremities:   Skin:   Neurological:  Psychiatry:    LINES:    HOSPITAL MEDICATIONS:  MEDICATIONS  (STANDING):  aspirin  chewable 81 milliGRAM(s) Oral daily  atorvastatin 80 milliGRAM(s) Oral at bedtime  clopidogrel Tablet 75 milliGRAM(s) Oral daily  dextrose 5% + sodium chloride 0.45%. 1000 milliLiter(s) (15 mL/Hr) IV Continuous <Continuous>  heparin  Infusion.  Unit(s)/Hr (6.5 mL/Hr) IV Continuous <Continuous>  insulin Infusion 0.5 Unit(s)/Hr (0.5 mL/Hr) IV Continuous <Continuous>  metoprolol succinate ER 25 milliGRAM(s) Oral daily  pantoprazole  Injectable 40 milliGRAM(s) IV Push every 12 hours  phenylephrine    Infusion 0.02 MICROgram(s)/kG/Min (0.4 mL/Hr) IV Continuous <Continuous>  piperacillin/tazobactam IVPB. 3.375 Gram(s) IV Intermittent every 12 hours    MEDICATIONS  (PRN):      LABS:                        11.2   10.1  )-----------( 215      ( 01 May 2018 02:09 )             33.6     Hgb Trend: 11.2<--, 12.8<--, 10.9<--, 11.4<--, 11.7<--  05-01    136  |  91<L>  |  24<H>  ----------------------------<  147<H>  4.1   |  27  |  3.91<H>    Ca    8.6      01 May 2018 02:13  Phos  4.6     05-01  Mg     2.2     05-01    TPro  7.1  /  Alb  4.4  /  TBili  0.4  /  DBili  x   /  AST  76<H>  /  ALT  25  /  AlkPhos  94  05-01    Creatinine Trend: 3.91<--, 3.66<--, 3.08<--, 7.13<--, 7.25<--, 7.06<--  PT/INR - ( 01 May 2018 02:09 )   PT: 13.6 sec;   INR: 1.24 ratio         PTT - ( 01 May 2018 02:09 )  PTT:42.7 sec    Arterial Blood Gas:  04-29 @ 16:00  7.23/36/54/15/77/-11.6  ABG lactate: --    Venous Blood Gas:  04-30 @ 17:55  7.43/45/79/29/95  VBG Lactate: 0.8  Venous Blood Gas:  04-30 @ 15:24  7.33/55/41/28/70  VBG Lactate: 1.8  Venous Blood Gas:  04-30 @ 11:48  7.36/47/44/26/74  VBG Lactate: 2.5  Venous Blood Gas:  04-30 @ 07:57  7.36/52/38/29/62  VBG Lactate: 3.4  Venous Blood Gas:  04-30 @ 04:17  7.41/42/72/26/94  VBG Lactate: 3.4  Venous Blood Gas:  04-30 @ 00:24  7.41/46/53/29/84  VBG Lactate: 3.7  Venous Blood Gas:  04-29 @ 20:14  7.38/42/45/24/75  VBG Lactate: 6.6  Venous Blood Gas:  04-29 @ 13:58  7.09/34/32/10/39  VBG Lactate: 11.6      MICROBIOLOGY:   Culture - Blood (04.29.18 @ 16:02)    Specimen Source: .Blood Blood    Culture Results:   No growth to date.    Culture - Blood (04.29.18 @ 16:02)    Specimen Source: .Blood Blood    Culture Results:   No growth to date.        RADIOLOGY:  [ ] Reviewed and interpreted by me    < from: US Abdomen Complete (04.30.18 @ 15:37) >  Extrahepatic biliary dilatation status post cholecystectomy without   evidence of choledocholithiasis.    Atrophic kidneys.    < from: CT Angio Abdomen and Pelvis w/ IV Cont (04.30.18 @ 13:25) >  A few foci of mild to moderate stenosis within the mid to distal SMA. The   celiac axis, proximal SMA and ANGELIKA are widely patent.    No bowel wall thickening to suggest ischemia.    Biliary ductal dilatation with interval increase from prior imaging.   Correlate with LFTs. MRCP may be performed as indicated.      < from: Xray Abdomen 1 View PORTABLE -Urgent (04.29.18 @ 20:35) >  Nonobstructive bowel gas pattern.        < from: Xray Chest 1 View- PORTABLE-Urgent (04.29.18 @ 16:55) >  Portable frontal view of the chest dated 4/29/2018 is compared   to 2/28/2018. Lungs are clear. There is no pleural effusion. Left   subclavian vascular stent is unchanged. Heart and mediastinum cannot be   evaluated.    Impression: Clear lungs.    < from: Xray Chest 1 View- PORTABLE-Urgent (04.29.18 @ 16:55) >  Impression: Clear lungs.            EKG:  < from: 12 Lead ECG (04.29.18 @ 13:38) >  Ventricular Rate 82 BPM    Atrial Rate 82 BPM    P-R Interval 142 ms    QRS Duration 112 ms     ms    QTc 518 ms    P Axis 82 degrees    R Axis 82 degrees    T Axis -37 degrees    Diagnosis Line NORMAL SINUS RHYTHM  INCOMPLETE RIGHT BUNDLE BRANCH BLOCK  ST ELEVATION CONSIDER ANTERIOR INJURY OR ACUTE INFARCT  PROLONGED QT  *** ** ** ** * ACUTE MI  ** ** ** **          Kayley Barr [facility] - [facility]  ex [id number] CHIEF COMPLAINT:  DKA ACS GI Bleed Metabolic acidosis     Interval Events:  62 yr f with PMHx of insulin dependent DM, ESRD on HD, HFrEF, frequent admissions for DKA, Presenting with AMS/Hypotension/hyperglycemic associated with coffee ground emesis and watery stools.  Found to have HAGMA, hyperglycemia of 1300. Admitted for DKA, GI Bleed also found to have ACS. No acute events over night. Insulin Gtt remains at .5units.  AG noted to be 18. Possible Cardiac cath today.          REVIEW OF SYSTEMS:  Constitutional: [ ] fevers [ ] chills [ ] weight loss [ ] weight gain denies  HEENT: [ ] dry eyes [ ] eye irritation [ ] postnasal drip [ ] nasal congestion denies  CV: [ ] chest pain [ ] orthopnea [ ] palpitations [ ] murmur denies any chest pain   Resp: [ ] cough [ ] shortness of breath [ ] dyspnea [ ] wheezing [ ] sputum [ ] hemoptysis denies   GI: [ ] nausea [ ] vomiting [ ] diarrhea [ ] constipation [ ] abd pain [ ] dysphagia denies any Nausea/ Vomiting   : [ ] dysuria [ ] nocturia [ ] hematuria [ ] increased urinary frequency denies any pain on urination   Musculoskeletal: [ ] back pain [ ] myalgias [ ] arthralgias [ ] fracture denies any weakness   Skin: [ ] rash [ ] itchdenies  Neurological: [ ] headache [ ] dizziness [ ] syncope [ ] weakness [ ] numbness denies   Psychiatric: [ ] anxiety [ ] depression denies  Endocrine: [ ] diabetes [ ] thyroid problem (+) IDDM   Hematologic/Lymphatic: [ ] anemia [ ] bleeding problem Denies   Allergic/Immunologic: [ ] itchy eyes [ ] nasal discharge [ ] hives [ ] angioedema denies   [x ] All other systems negative   [ ] Unable to assess ROS because ________    OBJECTIVE:  ICU Vital Signs Last 24 Hrs  T(C): 36.8 (01 May 2018 04:00), Max: 37.4 (01 May 2018 00:00)  T(F): 98.3 (01 May 2018 04:00), Max: 99.4 (01 May 2018 00:00)  HR: 74 (01 May 2018 05:00) (67 - 102)  BP: 97/51 (01 May 2018 05:00) (75/37 - 162/69)  BP(mean): 71 (01 May 2018 05:00) (51 - 99)  ABP: --  ABP(mean): --  RR: 16 (01 May 2018 05:00) (10 - 23)  SpO2: 98% (01 May 2018 05:00) (95% - 100%)        04-29 @ 07:01  -  04-30 @ 07:00  --------------------------------------------------------  IN: 686 mL / OUT: 0 mL / NET: 686 mL    04-30 @ 07:01  -  05-01 @ 05:39  --------------------------------------------------------  IN: 1823 mL / OUT: 2800 mL / NET: -977 mL      CAPILLARY BLOOD GLUCOSE      POCT Blood Glucose.: 207 mg/dL (01 May 2018 05:05)      PHYSICAL EXAM:  General: No Acute distress noted  HEENT:  PERRLA   Lymph Nodes: No palpable lymph adenopathy noted  Neck: Supple   Respiratory: Lungs clear bilaterally no SOB or rhonchi noted   Cardiovascular: s1 s2 no m/c/r/g noted   Abdomen: soft (+) - mild pain across abdomen with palpation   Extremities: (+) peripheral pulses noted to all extremities   Skin: warm pink and dry  Neurological: sleeping but easily accusable   Psychiatry: Pleasant affect     LINES: PIV ML # 20     HOSPITAL MEDICATIONS:  MEDICATIONS  (STANDING):  aspirin  chewable 81 milliGRAM(s) Oral daily  atorvastatin 80 milliGRAM(s) Oral at bedtime  clopidogrel Tablet 75 milliGRAM(s) Oral daily  dextrose 5% + sodium chloride 0.45%. 1000 milliLiter(s) (15 mL/Hr) IV Continuous <Continuous>  heparin  Infusion.  Unit(s)/Hr (6.5 mL/Hr) IV Continuous <Continuous>  insulin Infusion 0.5 Unit(s)/Hr (0.5 mL/Hr) IV Continuous <Continuous>  metoprolol succinate ER 25 milliGRAM(s) Oral daily  pantoprazole  Injectable 40 milliGRAM(s) IV Push every 12 hours  phenylephrine    Infusion 0.02 MICROgram(s)/kG/Min (0.4 mL/Hr) IV Continuous <Continuous>  piperacillin/tazobactam IVPB. 3.375 Gram(s) IV Intermittent every 12 hours    MEDICATIONS  (PRN):      LABS:                        11.2   10.1  )-----------( 215      ( 01 May 2018 02:09 )             33.6     Hgb Trend: 11.2<--, 12.8<--, 10.9<--, 11.4<--, 11.7<--  05-01    136  |  91<L>  |  24<H>  ----------------------------<  147<H>  4.1   |  27  |  3.91<H>    Ca    8.6      01 May 2018 02:13  Phos  4.6     05-01  Mg     2.2     05-01    TPro  7.1  /  Alb  4.4  /  TBili  0.4  /  DBili  x   /  AST  76<H>  /  ALT  25  /  AlkPhos  94  05-01    Creatinine Trend: 3.91<--, 3.66<--, 3.08<--, 7.13<--, 7.25<--, 7.06<--  PT/INR - ( 01 May 2018 02:09 )   PT: 13.6 sec;   INR: 1.24 ratio         PTT - ( 01 May 2018 02:09 )  PTT:42.7 sec    Arterial Blood Gas:  04-29 @ 16:00  7.23/36/54/15/77/-11.6  ABG lactate: --    Venous Blood Gas:  04-30 @ 17:55  7.43/45/79/29/95  VBG Lactate: 0.8  Venous Blood Gas:  04-30 @ 15:24  7.33/55/41/28/70  VBG Lactate: 1.8  Venous Blood Gas:  04-30 @ 11:48  7.36/47/44/26/74  VBG Lactate: 2.5  Venous Blood Gas:  04-30 @ 07:57  7.36/52/38/29/62  VBG Lactate: 3.4  Venous Blood Gas:  04-30 @ 04:17  7.41/42/72/26/94  VBG Lactate: 3.4  Venous Blood Gas:  04-30 @ 00:24  7.41/46/53/29/84  VBG Lactate: 3.7  Venous Blood Gas:  04-29 @ 20:14  7.38/42/45/24/75  VBG Lactate: 6.6  Venous Blood Gas:  04-29 @ 13:58  7.09/34/32/10/39  VBG Lactate: 11.6      MICROBIOLOGY:   Culture - Blood (04.29.18 @ 16:02)    Specimen Source: .Blood Blood    Culture Results:   No growth to date.    Culture - Blood (04.29.18 @ 16:02)    Specimen Source: .Blood Blood    Culture Results:   No growth to date.        RADIOLOGY:  [ ] Reviewed and interpreted by me    < from: US Abdomen Complete (04.30.18 @ 15:37) >  Extrahepatic biliary dilatation status post cholecystectomy without   evidence of choledocholithiasis.    Atrophic kidneys.    < from: CT Angio Abdomen and Pelvis w/ IV Cont (04.30.18 @ 13:25) >  A few foci of mild to moderate stenosis within the mid to distal SMA. The   celiac axis, proximal SMA and ANGELIKA are widely patent.    No bowel wall thickening to suggest ischemia.    Biliary ductal dilatation with interval increase from prior imaging.   Correlate with LFTs. MRCP may be performed as indicated.      < from: Xray Abdomen 1 View PORTABLE -Urgent (04.29.18 @ 20:35) >  Nonobstructive bowel gas pattern.        < from: Xray Chest 1 View- PORTABLE-Urgent (04.29.18 @ 16:55) >  Portable frontal view of the chest dated 4/29/2018 is compared   to 2/28/2018. Lungs are clear. There is no pleural effusion. Left   subclavian vascular stent is unchanged. Heart and mediastinum cannot be   evaluated.    Impression: Clear lungs.    < from: Xray Chest 1 View- PORTABLE-Urgent (04.29.18 @ 16:55) >  Impression: Clear lungs.            EKG:  < from: 12 Lead ECG (04.29.18 @ 13:38) >  Ventricular Rate 82 BPM    Atrial Rate 82 BPM    P-R Interval 142 ms    QRS Duration 112 ms     ms    QTc 518 ms    P Axis 82 degrees    R Axis 82 degrees    T Axis -37 degrees    Diagnosis Line NORMAL SINUS RHYTHM  INCOMPLETE RIGHT BUNDLE BRANCH BLOCK  ST ELEVATION CONSIDER ANTERIOR INJURY OR ACUTE INFARCT  PROLONGED QT  *** ** ** ** * ACUTE MI  ** ** ** **          Kayley Barr [facility] - [place]  ex [id number]

## 2018-05-02 LAB
ALBUMIN SERPL ELPH-MCNC: 3.6 G/DL — SIGNIFICANT CHANGE UP (ref 3.3–5)
ALBUMIN SERPL ELPH-MCNC: 3.7 G/DL — SIGNIFICANT CHANGE UP (ref 3.3–5)
ALP SERPL-CCNC: 88 U/L — SIGNIFICANT CHANGE UP (ref 40–120)
ALP SERPL-CCNC: 95 U/L — SIGNIFICANT CHANGE UP (ref 40–120)
ALT FLD-CCNC: 23 U/L — SIGNIFICANT CHANGE UP (ref 10–45)
ALT FLD-CCNC: 25 U/L — SIGNIFICANT CHANGE UP (ref 10–45)
ANION GAP SERPL CALC-SCNC: 15 MMOL/L — SIGNIFICANT CHANGE UP (ref 5–17)
ANION GAP SERPL CALC-SCNC: 21 MMOL/L — HIGH (ref 5–17)
ANION GAP SERPL CALC-SCNC: 22 MMOL/L — HIGH (ref 5–17)
ANION GAP SERPL CALC-SCNC: 24 MMOL/L — HIGH (ref 5–17)
ANION GAP SERPL CALC-SCNC: 26 MMOL/L — HIGH (ref 5–17)
APTT BLD: 47.8 SEC — HIGH (ref 27.5–37.4)
APTT BLD: 57.5 SEC — HIGH (ref 27.5–37.4)
APTT BLD: > 200 SEC (ref 27.5–37.4)
AST SERPL-CCNC: 41 U/L — HIGH (ref 10–40)
AST SERPL-CCNC: 47 U/L — HIGH (ref 10–40)
B-OH-BUTYR SERPL-SCNC: 0.1 MMOL/L — SIGNIFICANT CHANGE UP
B-OH-BUTYR SERPL-SCNC: 0.1 MMOL/L — SIGNIFICANT CHANGE UP
B-OH-BUTYR SERPL-SCNC: 1.3 MMOL/L — HIGH
B-OH-BUTYR SERPL-SCNC: 2.6 MMOL/L — HIGH
BASE EXCESS BLDV CALC-SCNC: 1.7 MMOL/L — SIGNIFICANT CHANGE UP (ref -2–2)
BILIRUB SERPL-MCNC: 0.3 MG/DL — SIGNIFICANT CHANGE UP (ref 0.2–1.2)
BILIRUB SERPL-MCNC: 0.4 MG/DL — SIGNIFICANT CHANGE UP (ref 0.2–1.2)
BUN SERPL-MCNC: 14 MG/DL — SIGNIFICANT CHANGE UP (ref 7–23)
BUN SERPL-MCNC: 39 MG/DL — HIGH (ref 7–23)
BUN SERPL-MCNC: 43 MG/DL — HIGH (ref 7–23)
BUN SERPL-MCNC: 44 MG/DL — HIGH (ref 7–23)
BUN SERPL-MCNC: 47 MG/DL — HIGH (ref 7–23)
CA-I SERPL-SCNC: 1 MMOL/L — LOW (ref 1.12–1.3)
CALCIUM SERPL-MCNC: 8 MG/DL — LOW (ref 8.4–10.5)
CALCIUM SERPL-MCNC: 8 MG/DL — LOW (ref 8.4–10.5)
CALCIUM SERPL-MCNC: 8.1 MG/DL — LOW (ref 8.4–10.5)
CALCIUM SERPL-MCNC: 8.2 MG/DL — LOW (ref 8.4–10.5)
CALCIUM SERPL-MCNC: 9 MG/DL — SIGNIFICANT CHANGE UP (ref 8.4–10.5)
CHLORIDE BLDV-SCNC: 94 MMOL/L — LOW (ref 96–108)
CHLORIDE SERPL-SCNC: 85 MMOL/L — LOW (ref 96–108)
CHLORIDE SERPL-SCNC: 88 MMOL/L — LOW (ref 96–108)
CHLORIDE SERPL-SCNC: 89 MMOL/L — LOW (ref 96–108)
CHLORIDE SERPL-SCNC: 89 MMOL/L — LOW (ref 96–108)
CHLORIDE SERPL-SCNC: 95 MMOL/L — LOW (ref 96–108)
CK MB BLD-MCNC: 3.4 % — SIGNIFICANT CHANGE UP (ref 0–3.5)
CK MB BLD-MCNC: 3.6 % — HIGH (ref 0–3.5)
CK MB CFR SERPL CALC: 3.6 NG/ML — SIGNIFICANT CHANGE UP (ref 0–3.8)
CK MB CFR SERPL CALC: 4.6 NG/ML — HIGH (ref 0–3.8)
CK MB CFR SERPL CALC: 6.5 NG/ML — HIGH (ref 0–3.8)
CK SERPL-CCNC: 115 U/L — SIGNIFICANT CHANGE UP (ref 25–170)
CK SERPL-CCNC: 127 U/L — SIGNIFICANT CHANGE UP (ref 25–170)
CK SERPL-CCNC: 194 U/L — HIGH (ref 25–170)
CO2 BLDV-SCNC: 28 MMOL/L — SIGNIFICANT CHANGE UP (ref 22–30)
CO2 SERPL-SCNC: 19 MMOL/L — LOW (ref 22–31)
CO2 SERPL-SCNC: 21 MMOL/L — LOW (ref 22–31)
CO2 SERPL-SCNC: 23 MMOL/L — SIGNIFICANT CHANGE UP (ref 22–31)
CO2 SERPL-SCNC: 24 MMOL/L — SIGNIFICANT CHANGE UP (ref 22–31)
CO2 SERPL-SCNC: 28 MMOL/L — SIGNIFICANT CHANGE UP (ref 22–31)
CREAT SERPL-MCNC: 2.5 MG/DL — HIGH (ref 0.5–1.3)
CREAT SERPL-MCNC: 5.39 MG/DL — HIGH (ref 0.5–1.3)
CREAT SERPL-MCNC: 5.85 MG/DL — HIGH (ref 0.5–1.3)
CREAT SERPL-MCNC: 6.23 MG/DL — HIGH (ref 0.5–1.3)
CREAT SERPL-MCNC: 6.45 MG/DL — HIGH (ref 0.5–1.3)
GAS PNL BLDV: 131 MMOL/L — LOW (ref 136–145)
GAS PNL BLDV: SIGNIFICANT CHANGE UP
GAS PNL BLDV: SIGNIFICANT CHANGE UP
GLUCOSE BLDC GLUCOMTR-MCNC: 104 MG/DL — HIGH (ref 70–99)
GLUCOSE BLDC GLUCOMTR-MCNC: 106 MG/DL — HIGH (ref 70–99)
GLUCOSE BLDC GLUCOMTR-MCNC: 136 MG/DL — HIGH (ref 70–99)
GLUCOSE BLDC GLUCOMTR-MCNC: 147 MG/DL — HIGH (ref 70–99)
GLUCOSE BLDC GLUCOMTR-MCNC: 193 MG/DL — HIGH (ref 70–99)
GLUCOSE BLDC GLUCOMTR-MCNC: 203 MG/DL — HIGH (ref 70–99)
GLUCOSE BLDC GLUCOMTR-MCNC: 205 MG/DL — HIGH (ref 70–99)
GLUCOSE BLDC GLUCOMTR-MCNC: 214 MG/DL — HIGH (ref 70–99)
GLUCOSE BLDC GLUCOMTR-MCNC: 233 MG/DL — HIGH (ref 70–99)
GLUCOSE BLDC GLUCOMTR-MCNC: 235 MG/DL — HIGH (ref 70–99)
GLUCOSE BLDC GLUCOMTR-MCNC: 263 MG/DL — HIGH (ref 70–99)
GLUCOSE BLDC GLUCOMTR-MCNC: 278 MG/DL — HIGH (ref 70–99)
GLUCOSE BLDC GLUCOMTR-MCNC: 289 MG/DL — HIGH (ref 70–99)
GLUCOSE BLDC GLUCOMTR-MCNC: 297 MG/DL — HIGH (ref 70–99)
GLUCOSE BLDC GLUCOMTR-MCNC: 90 MG/DL — SIGNIFICANT CHANGE UP (ref 70–99)
GLUCOSE BLDC GLUCOMTR-MCNC: >600 MG/DL — CRITICAL HIGH (ref 70–99)
GLUCOSE BLDV-MCNC: 155 MG/DL — HIGH (ref 70–99)
GLUCOSE SERPL-MCNC: 161 MG/DL — HIGH (ref 70–99)
GLUCOSE SERPL-MCNC: 194 MG/DL — HIGH (ref 70–99)
GLUCOSE SERPL-MCNC: 196 MG/DL — HIGH (ref 70–99)
GLUCOSE SERPL-MCNC: 230 MG/DL — HIGH (ref 70–99)
GLUCOSE SERPL-MCNC: 89 MG/DL — SIGNIFICANT CHANGE UP (ref 70–99)
HCO3 BLDV-SCNC: 26 MMOL/L — SIGNIFICANT CHANGE UP (ref 21–29)
HCT VFR BLD CALC: 29.4 % — LOW (ref 34.5–45)
HCT VFR BLD CALC: 31.2 % — LOW (ref 34.5–45)
HCT VFR BLDA CALC: 34 % — LOW (ref 39–50)
HGB BLD CALC-MCNC: 10.9 G/DL — LOW (ref 11.5–15.5)
HGB BLD-MCNC: 10.3 G/DL — LOW (ref 11.5–15.5)
HGB BLD-MCNC: 9.6 G/DL — LOW (ref 11.5–15.5)
INR BLD: 1.14 RATIO — SIGNIFICANT CHANGE UP (ref 0.88–1.16)
LACTATE BLDV-MCNC: 1.2 MMOL/L — SIGNIFICANT CHANGE UP (ref 0.7–2)
MAGNESIUM SERPL-MCNC: 2.2 MG/DL — SIGNIFICANT CHANGE UP (ref 1.6–2.6)
MAGNESIUM SERPL-MCNC: 2.3 MG/DL — SIGNIFICANT CHANGE UP (ref 1.6–2.6)
MCHC RBC-ENTMCNC: 32.6 GM/DL — SIGNIFICANT CHANGE UP (ref 32–36)
MCHC RBC-ENTMCNC: 32.9 GM/DL — SIGNIFICANT CHANGE UP (ref 32–36)
MCHC RBC-ENTMCNC: 33 PG — SIGNIFICANT CHANGE UP (ref 27–34)
MCHC RBC-ENTMCNC: 33.8 PG — SIGNIFICANT CHANGE UP (ref 27–34)
MCV RBC AUTO: 101 FL — HIGH (ref 80–100)
MCV RBC AUTO: 103 FL — HIGH (ref 80–100)
OTHER CELLS CSF MANUAL: 10 ML/DL — LOW (ref 18–22)
PCO2 BLDV: 44 MMHG — SIGNIFICANT CHANGE UP (ref 35–50)
PH BLDV: 7.4 — SIGNIFICANT CHANGE UP (ref 7.35–7.45)
PHOSPHATE SERPL-MCNC: 5.6 MG/DL — HIGH (ref 2.5–4.5)
PHOSPHATE SERPL-MCNC: 5.7 MG/DL — HIGH (ref 2.5–4.5)
PLATELET # BLD AUTO: 169 K/UL — SIGNIFICANT CHANGE UP (ref 150–400)
PLATELET # BLD AUTO: 175 K/UL — SIGNIFICANT CHANGE UP (ref 150–400)
PO2 BLDV: 40 MMHG — SIGNIFICANT CHANGE UP (ref 25–45)
POTASSIUM BLDV-SCNC: 4.1 MMOL/L — SIGNIFICANT CHANGE UP (ref 3.5–5)
POTASSIUM SERPL-MCNC: 3.1 MMOL/L — LOW (ref 3.5–5.3)
POTASSIUM SERPL-MCNC: 4.3 MMOL/L — SIGNIFICANT CHANGE UP (ref 3.5–5.3)
POTASSIUM SERPL-MCNC: 4.4 MMOL/L — SIGNIFICANT CHANGE UP (ref 3.5–5.3)
POTASSIUM SERPL-MCNC: 4.9 MMOL/L — SIGNIFICANT CHANGE UP (ref 3.5–5.3)
POTASSIUM SERPL-MCNC: 5.2 MMOL/L — SIGNIFICANT CHANGE UP (ref 3.5–5.3)
POTASSIUM SERPL-SCNC: 3.1 MMOL/L — LOW (ref 3.5–5.3)
POTASSIUM SERPL-SCNC: 4.3 MMOL/L — SIGNIFICANT CHANGE UP (ref 3.5–5.3)
POTASSIUM SERPL-SCNC: 4.4 MMOL/L — SIGNIFICANT CHANGE UP (ref 3.5–5.3)
POTASSIUM SERPL-SCNC: 4.9 MMOL/L — SIGNIFICANT CHANGE UP (ref 3.5–5.3)
POTASSIUM SERPL-SCNC: 5.2 MMOL/L — SIGNIFICANT CHANGE UP (ref 3.5–5.3)
PROT SERPL-MCNC: 6.2 G/DL — SIGNIFICANT CHANGE UP (ref 6–8.3)
PROT SERPL-MCNC: 6.7 G/DL — SIGNIFICANT CHANGE UP (ref 6–8.3)
PROTHROM AB SERPL-ACNC: 12.4 SEC — SIGNIFICANT CHANGE UP (ref 9.8–12.7)
RBC # BLD: 2.91 M/UL — LOW (ref 3.8–5.2)
RBC # BLD: 3.03 M/UL — LOW (ref 3.8–5.2)
RBC # FLD: 12.8 % — SIGNIFICANT CHANGE UP (ref 10.3–14.5)
RBC # FLD: 12.8 % — SIGNIFICANT CHANGE UP (ref 10.3–14.5)
SAO2 % BLDV: 69 % — SIGNIFICANT CHANGE UP (ref 67–88)
SODIUM SERPL-SCNC: 130 MMOL/L — LOW (ref 135–145)
SODIUM SERPL-SCNC: 133 MMOL/L — LOW (ref 135–145)
SODIUM SERPL-SCNC: 134 MMOL/L — LOW (ref 135–145)
SODIUM SERPL-SCNC: 134 MMOL/L — LOW (ref 135–145)
SODIUM SERPL-SCNC: 138 MMOL/L — SIGNIFICANT CHANGE UP (ref 135–145)
TROPONIN T SERPL-MCNC: 10.06 NG/ML — HIGH (ref 0–0.06)
TROPONIN T SERPL-MCNC: 10.28 NG/ML — HIGH (ref 0–0.06)
TROPONIN T SERPL-MCNC: 11.33 NG/ML — HIGH (ref 0–0.06)
WBC # BLD: 6.8 K/UL — SIGNIFICANT CHANGE UP (ref 3.8–10.5)
WBC # BLD: 7.7 K/UL — SIGNIFICANT CHANGE UP (ref 3.8–10.5)
WBC # FLD AUTO: 6.8 K/UL — SIGNIFICANT CHANGE UP (ref 3.8–10.5)
WBC # FLD AUTO: 7.7 K/UL — SIGNIFICANT CHANGE UP (ref 3.8–10.5)

## 2018-05-02 PROCEDURE — 99232 SBSQ HOSP IP/OBS MODERATE 35: CPT

## 2018-05-02 PROCEDURE — 99291 CRITICAL CARE FIRST HOUR: CPT

## 2018-05-02 PROCEDURE — 12345: CPT | Mod: NC

## 2018-05-02 RX ORDER — DEXTROSE 50 % IN WATER 50 %
12.5 SYRINGE (ML) INTRAVENOUS ONCE
Qty: 0 | Refills: 0 | Status: DISCONTINUED | OUTPATIENT
Start: 2018-05-02 | End: 2018-05-05

## 2018-05-02 RX ORDER — INSULIN LISPRO 100/ML
VIAL (ML) SUBCUTANEOUS
Qty: 0 | Refills: 0 | Status: DISCONTINUED | OUTPATIENT
Start: 2018-05-02 | End: 2018-05-03

## 2018-05-02 RX ORDER — DEXTROSE 50 % IN WATER 50 %
25 SYRINGE (ML) INTRAVENOUS ONCE
Qty: 0 | Refills: 0 | Status: DISCONTINUED | OUTPATIENT
Start: 2018-05-02 | End: 2018-05-05

## 2018-05-02 RX ORDER — INSULIN LISPRO 100/ML
4 VIAL (ML) SUBCUTANEOUS
Qty: 0 | Refills: 0 | Status: DISCONTINUED | OUTPATIENT
Start: 2018-05-02 | End: 2018-05-03

## 2018-05-02 RX ORDER — INSULIN GLARGINE 100 [IU]/ML
12 INJECTION, SOLUTION SUBCUTANEOUS ONCE
Qty: 0 | Refills: 0 | Status: COMPLETED | OUTPATIENT
Start: 2018-05-02 | End: 2018-05-02

## 2018-05-02 RX ORDER — SODIUM CHLORIDE 9 MG/ML
1000 INJECTION, SOLUTION INTRAVENOUS
Qty: 0 | Refills: 0 | Status: DISCONTINUED | OUTPATIENT
Start: 2018-05-02 | End: 2018-05-05

## 2018-05-02 RX ORDER — DEXTROSE 50 % IN WATER 50 %
1 SYRINGE (ML) INTRAVENOUS ONCE
Qty: 0 | Refills: 0 | Status: DISCONTINUED | OUTPATIENT
Start: 2018-05-02 | End: 2018-05-05

## 2018-05-02 RX ORDER — GLUCAGON INJECTION, SOLUTION 0.5 MG/.1ML
1 INJECTION, SOLUTION SUBCUTANEOUS ONCE
Qty: 0 | Refills: 0 | Status: DISCONTINUED | OUTPATIENT
Start: 2018-05-02 | End: 2018-05-05

## 2018-05-02 RX ORDER — NOREPINEPHRINE BITARTRATE/D5W 8 MG/250ML
0.02 PLASTIC BAG, INJECTION (ML) INTRAVENOUS
Qty: 8 | Refills: 0 | Status: DISCONTINUED | OUTPATIENT
Start: 2018-05-02 | End: 2018-05-02

## 2018-05-02 RX ORDER — ERYTHROPOIETIN 10000 [IU]/ML
10000 INJECTION, SOLUTION INTRAVENOUS; SUBCUTANEOUS ONCE
Qty: 0 | Refills: 0 | Status: COMPLETED | OUTPATIENT
Start: 2018-05-02 | End: 2018-05-02

## 2018-05-02 RX ORDER — ERYTHROPOIETIN 10000 [IU]/ML
10000 INJECTION, SOLUTION INTRAVENOUS; SUBCUTANEOUS ONCE
Qty: 0 | Refills: 0 | Status: DISCONTINUED | OUTPATIENT
Start: 2018-05-02 | End: 2018-05-02

## 2018-05-02 RX ORDER — POTASSIUM CHLORIDE 20 MEQ
40 PACKET (EA) ORAL ONCE
Qty: 0 | Refills: 0 | Status: COMPLETED | OUTPATIENT
Start: 2018-05-02 | End: 2018-05-02

## 2018-05-02 RX ADMIN — PANTOPRAZOLE SODIUM 40 MILLIGRAM(S): 20 TABLET, DELAYED RELEASE ORAL at 04:34

## 2018-05-02 RX ADMIN — CLOPIDOGREL BISULFATE 75 MILLIGRAM(S): 75 TABLET, FILM COATED ORAL at 11:45

## 2018-05-02 RX ADMIN — Medication 4 UNIT(S): at 18:32

## 2018-05-02 RX ADMIN — HEPARIN SODIUM 1300 UNIT(S)/HR: 5000 INJECTION INTRAVENOUS; SUBCUTANEOUS at 04:34

## 2018-05-02 RX ADMIN — Medication 4 UNIT(S): at 11:56

## 2018-05-02 RX ADMIN — PANTOPRAZOLE SODIUM 40 MILLIGRAM(S): 20 TABLET, DELAYED RELEASE ORAL at 09:29

## 2018-05-02 RX ADMIN — ATORVASTATIN CALCIUM 80 MILLIGRAM(S): 80 TABLET, FILM COATED ORAL at 22:21

## 2018-05-02 RX ADMIN — Medication 40 MILLIEQUIVALENT(S): at 23:31

## 2018-05-02 RX ADMIN — Medication 1.99 MICROGRAM(S)/KG/MIN: at 05:20

## 2018-05-02 RX ADMIN — PIPERACILLIN AND TAZOBACTAM 25 GRAM(S): 4; .5 INJECTION, POWDER, LYOPHILIZED, FOR SOLUTION INTRAVENOUS at 09:29

## 2018-05-02 RX ADMIN — PIPERACILLIN AND TAZOBACTAM 25 GRAM(S): 4; .5 INJECTION, POWDER, LYOPHILIZED, FOR SOLUTION INTRAVENOUS at 22:21

## 2018-05-02 RX ADMIN — INSULIN GLARGINE 12 UNIT(S): 100 INJECTION, SOLUTION SUBCUTANEOUS at 10:18

## 2018-05-02 RX ADMIN — ERYTHROPOIETIN 10000 UNIT(S): 10000 INJECTION, SOLUTION INTRAVENOUS; SUBCUTANEOUS at 18:56

## 2018-05-02 RX ADMIN — Medication 1: at 18:31

## 2018-05-02 RX ADMIN — Medication 81 MILLIGRAM(S): at 11:45

## 2018-05-02 NOTE — PROGRESS NOTE ADULT - SUBJECTIVE AND OBJECTIVE BOX
Subjective: Patient seen and examined. No new events except as noted.   comfortable offers no complaints but depressed  S/P PTCI of ostial RCA and PL branch via right femoral artery  heathers cp or sob  on insulin drip  MEDICATIONS:  MEDICATIONS  (STANDING):  aspirin  chewable 81 milliGRAM(s) Oral daily  atorvastatin 80 milliGRAM(s) Oral at bedtime  clopidogrel Tablet 75 milliGRAM(s) Oral daily  dextrose 5% + sodium chloride 0.45%. 1000 milliLiter(s) (10 mL/Hr) IV Continuous <Continuous>  heparin  Infusion. 1200 Unit(s)/Hr (12 mL/Hr) IV Continuous <Continuous>  insulin Infusion 1.5 Unit(s)/Hr (1.5 mL/Hr) IV Continuous <Continuous>  norepinephrine Infusion 0.02 MICROgram(s)/kG/Min (1.988 mL/Hr) IV Continuous <Continuous>  pantoprazole  Injectable 40 milliGRAM(s) IV Push every 12 hours  piperacillin/tazobactam IVPB. 3.375 Gram(s) IV Intermittent every 12 hours      PHYSICAL EXAM:  T(C): 37 (05-02-18 @ 04:00), Max: 37 (05-02-18 @ 04:00)  HR: 73 (05-02-18 @ 07:15) (58 - 76)  BP: 102/56 (05-02-18 @ 07:15) (69/34 - 165/72)  RR: 16 (05-02-18 @ 07:15) (10 - 60)  SpO2: 99% (05-02-18 @ 07:15) (95% - 100%)  Wt(kg): --  I&O's Summary    01 May 2018 07:01  -  02 May 2018 07:00  --------------------------------------------------------  IN: 757.8 mL / OUT: 0 mL / NET: 757.8 mL      Height (cm): 160.02 (05-01 @ 10:11)  Weight (kg): 53 (05-01 @ 10:11)  BMI (kg/m2): 20.7 (05-01 @ 10:11)  BSA (m2): 1.54 (05-01 @ 10:11)    Appearance: Normal chronically ill appearing	  HEENT:   Normal oral mucosa, PERRL, EOMI	  Cardiovascular: Normal S1 S2, No JVD, No murmurs ,  Respiratory: Lungs clear to auscultation, normal effort 	  Ext: No edema  right groin no hematoma right femoral pulse 2+      LABS:    CARDIAC MARKERS:  CARDIAC MARKERS ( 01 May 2018 23:31 )  x     / 10.28 ng/mL / 194 U/L / x     / 6.5 ng/mL  CARDIAC MARKERS ( 01 May 2018 15:42 )  x     / 8.74 ng/mL / 236 U/L / x     / 9.1 ng/mL  CARDIAC MARKERS ( 01 May 2018 02:13 )  x     / 8.27 ng/mL / 404 U/L / x     / 19.6 ng/mL  CARDIAC MARKERS ( 30 Apr 2018 18:16 )  x     / 8.84 ng/mL / 846 U/L / x     / 49.1 ng/mL  CARDIAC MARKERS ( 30 Apr 2018 08:14 )  x     / 11.36 ng/mL / 1377 U/L / x     / 88.7 ng/mL  CARDIAC MARKERS ( 30 Apr 2018 00:20 )  x     / 9.69 ng/mL / 1827 U/L / x     / 150.5 ng/mL  CARDIAC MARKERS ( 29 Apr 2018 20:42 )  x     / 6.87 ng/mL / 1506 U/L / x     / 138.5 ng/mL  CARDIAC MARKERS ( 29 Apr 2018 13:58 )  x     / 1.35 ng/mL / 512 U/L / x     / 34.9 ng/mL                                9.6    7.7   )-----------( 175      ( 02 May 2018 03:26 )             29.4     05-02    134<L>  |  89<L>  |  43<H>  ----------------------------<  230<H>  4.4   |  24  |  5.85<H>    Ca    8.0<L>      02 May 2018 03:26  Phos  5.6     05-02  Mg     2.3     05-02    TPro  6.2  /  Alb  3.6  /  TBili  0.3  /  DBili  x   /  AST  41<H>  /  ALT  23  /  AlkPhos  88  05-02    proBNP:   Lipid Profile:   HgA1c:   TSH:           TELEMETRY:

## 2018-05-02 NOTE — PROGRESS NOTE ADULT - SUBJECTIVE AND OBJECTIVE BOX
[facility]   [phone]  DIALYSIS NOTE  Chief Complaint: ESRD/Ongoing hemodialysis requirement. seen on hd    24 hour events/subjective:      states feels a little stronger today       ALLERGIES & MEDICATIONS  --------------------------------------------------------------------------------  Allergies    No Known Allergies    Intolerances      Standing Inpatient Medications  aspirin  chewable 81 milliGRAM(s) Oral daily  atorvastatin 80 milliGRAM(s) Oral at bedtime  clopidogrel Tablet 75 milliGRAM(s) Oral daily  dextrose 5% + sodium chloride 0.45%. 1000 milliLiter(s) IV Continuous <Continuous>  dextrose 5%. 1000 milliLiter(s) IV Continuous <Continuous>  dextrose 50% Injectable 12.5 Gram(s) IV Push once  dextrose 50% Injectable 25 Gram(s) IV Push once  dextrose 50% Injectable 25 Gram(s) IV Push once  epoetin azalea Injectable 33349 Unit(s) IV Push once  insulin Infusion 1.5 Unit(s)/Hr IV Continuous <Continuous>  insulin lispro (HumaLOG) corrective regimen sliding scale   SubCutaneous four times a day before meals  insulin lispro Injectable (HumaLOG) 4 Unit(s) SubCutaneous before breakfast  insulin lispro Injectable (HumaLOG) 4 Unit(s) SubCutaneous before lunch  insulin lispro Injectable (HumaLOG) 4 Unit(s) SubCutaneous before dinner  pantoprazole  Injectable 40 milliGRAM(s) IV Push every 12 hours  piperacillin/tazobactam IVPB. 3.375 Gram(s) IV Intermittent every 12 hours    PRN Inpatient Medications  dextrose Gel 1 Dose(s) Oral once PRN  glucagon  Injectable 1 milliGRAM(s) IntraMuscular once PRN      REVIEW OF SYSTEMS  --------------------------------------------------------------------------------  no itching or rash  no fever or chill  no cp or palp   no sob or cough   no N/V/D/ no abd pain   ext no edema         VITALS/PHYSICAL EXAM  --------------------------------------------------------------------------------  T(C): 36.5 (05-02-18 @ 08:00), Max: 37 (05-02-18 @ 04:00)  HR: 69 (05-02-18 @ 10:00) (58 - 76)  BP: 128/60 (05-02-18 @ 10:00) (69/34 - 165/72)  RR: 21 (05-02-18 @ 10:00) (10 - 60)  SpO2: 100% (05-02-18 @ 10:00) (95% - 100%)  Wt(kg): --  Height (cm): 160.02 (05-01-18 @ 10:11)  Weight (kg): 53 (05-01-18 @ 10:11)  BMI (kg/m2): 20.7 (05-01-18 @ 10:11)  BSA (m2): 1.54 (05-01-18 @ 10:11)      05-01-18 @ 07:01  -  05-02-18 @ 07:00  --------------------------------------------------------  IN: 757.8 mL / OUT: 0 mL / NET: 757.8 mL      Physical Exam:  		    	Gen: alert oriented   	Pulm: Decreased breath sounds b/l bases no rales or ronchi  	CV: RRR, S1/S2  	Abd: +BS, soft, nontender/nondistended  	Extremity: No cyanosis, no edema no clubbing  		    LABS/STUDIES  --------------------------------------------------------------------------------              9.6    7.7   >-----------<  175      [05-02-18 @ 03:26]              29.4     134  |  89  |  44  ----------------------------<  161      [05-02-18 @ 09:05]  4.3   |  23  |  6.23        Ca     8.0     [05-02-18 @ 09:05]      Mg     2.2     [05-02-18 @ 09:05]      Phos  5.6     [05-02-18 @ 09:05]    TPro  6.2  /  Alb  3.6  /  TBili  0.3  /  DBili  x   /  AST  41  /  ALT  23  /  AlkPhos  88  [05-02-18 @ 03:26]    PT/INR: PT 12.4 , INR 1.14       [05-02-18 @ 03:26]  PTT: 57.5       [05-02-18 @ 09:05]    Troponin 11.33      [05-02-18 @ 09:05]        [05-02-18 @ 09:05]            imp/suggest: ESRD      Hemodialysis Prescription:  	Access: avf  	Dialyzer: revaclear  300  	Blood Flow (mL/Min): 400  	Dialysate Flow (mL/Min): 600  	Target UF (Liters): 1 liter- 1.5 liter   	Treatment Time: 3.5 hr  	Potassium: 2k  	Calcium: 2.5  	  YOLANDA  epogen [id number] u     Vitamin D     continue with hd   see hd flow sheet

## 2018-05-02 NOTE — PROGRESS NOTE ADULT - ASSESSMENT
62yoF known IDDM admitted in DKA, MOHIT on CRF, and a NSTEMI s/p RCI balloon angioplasty, who remains on insulin and heparin gtts.     Neuro: AMS resolved as DKA is resolving  Mental status now improve and back at baseline  -c/w neuro checks as per protocol  -hx CVA- c/w monitoring closely    CV: Hx MI, CAD s/p PCI Ostial RCA for recurrence stenosis, HTN, now a/w NSTEMI s/p RCA Balloon Angioplasty  -presently on a Heparin gtt- now post procedure - D/C  -c/w plavix , asa, statin  -reintroduce beta blockers as tolerated  -f/u cardiology and interventional cardiology    Pulm: No active pulmonary issues  -c/w monitoring  -chest PT / Incentive spirometry / out of bed to chair    Endocrine: Insulin Dependent Diabetes- uses an insulin pump at home, now a/w DKA- DKA resoloved  -PO diet  -start Lantus 12U now  -start pre-meal humolog- 4U pre-meal  -low dose insulin sliding scale  -c/w dka protocol- insulin gtt over an additional 2 hours s/p lantus administration- than d/c  -f/u endocrine- will restart pt insulin pump tomorroe    Renal: ESRD on HD 4 x week , MOHIT on CKD  -f/u renal - HD as per renal  -HD planned for today    R/O GIB- no active bleeding / H/H stable / no further n/v, no melena or brbpr  -c/w protonix twice daily for now  -concern for Biliary Dilatation - consider GI consult    Sepsis: RVP neg, BC negative, possible aspiration in the setting of having an AMS during ketoacidosis event  -c/w assessing for infection  -c/w zosyn x 5 day total

## 2018-05-02 NOTE — PROGRESS NOTE ADULT - ASSESSMENT
62yoF known IDDM admitted in DKA, MOHIT on CRF, and a NSTEMI s/p RCI balloon angioplasty, who remains on insulin and heparin gtts.     Neuro: AMS resolved as DKA is resolving  Mental status now improve and back at baseline  -c/w neuro checks as per protocol  -hx CVA- c/w monitoring closely    CV: Hx MI, CAD s/p PCI RCA, HTN, now a/w NSTEMI s/p RCA Balloon Angioplasty  -c/w heparin gtt  -c/w plavix , asa 62yoF known IDDM admitted in DKA, MOHIT on CRF, and a NSTEMI s/p RCI balloon angioplasty, who remains on insulin and heparin gtts.     Neuro: AMS resolved as DKA is resolving  Mental status now improve and back at baseline  -c/w neuro checks as per protocol  -hx CVA- c/w monitoring closely    CV: Hx MI, CAD s/p PCI Ostial RCA for recurrence stenosis, HTN, now a/w NSTEMI s/p RCA Balloon Angioplasty  -c/w heparin gtt  -c/w plavix , asa  - 62yoF known IDDM admitted in DKA, MOHIT on CRF, and a NSTEMI s/p RCI balloon angioplasty, who remains on insulin and heparin gtts.     Neuro: AMS resolved as DKA is resolving  Mental status now improve and back at baseline  -c/w neuro checks as per protocol  -hx CVA- c/w monitoring closely    CV: Hx MI, CAD s/p PCI Ostial RCA for recurrence stenosis, HTN, now a/w NSTEMI s/p RCA Balloon Angioplasty  -presently on a Heparin gtt- now post procedure - D/C  -c/w plavix , asa, statin  -reintroduce beta blockers as tolerated  -f/u cardiology and interventional cardiology    Pulm: No active pulmonary issues  -c/w monitoring  -chest PT / Incentive spirometry / out of bed to chair    Endocrine: Insulin Dependent Diabetes- uses an insulin pump at home, now a/w DKA- DKA resoloved  -PO diet  -start Lantus 12U now  -start pre-meal humolog- 4U pre-meal  -low dose insulin sliding scale  -c/w dka protocol- insulin gtt over an additional 2 hours s/p lantus administration- than d/c  -f/u endocrine- will restart pt insulin pump tomorroe    Renal: ESRD on HD 4 x week , MHOIT on CKD  -f/u renal - HD as per renal  -HD planned for today    R/O GIB- no active bleeding / H/H stable / no further n/v, no melena or brbpr  -c/w protonix twice daily for now  -concern for Biliary Dilatation - consider GI consult    Sepsis: RVP neg, BC negative, possible aspiration in the setting of having an AMS during ketoacidosis event  -c/w assessing for infection  -c/w zosyn x 5 day total

## 2018-05-02 NOTE — PROGRESS NOTE ADULT - SUBJECTIVE AND OBJECTIVE BOX
Diabetes Follow up note:  Interval Hx:  62 year old female w/uncontrolled T1DM w/complications (well known to team from prior admissions) here w/n/v found to be in DKA w/pump dislodged s/p PCI yesterday. Pt requiring insulin gtt overnight 1-1.5 units/hr. Also noted to be on D5 @10 cc/hr. Pt seen at bedside. Endorses "very hungry" but would like to defer starting pump until tomorrow. Pt agreeable to SQ injections for next 24 hours.     Review of Systems:  General: + hunger. + fatigue. Denies pain.   GI: Denies n/v/abd pain  CV: No CP/SOB  ENDO: No S&Sx of hypoglycemia  MEDS:  atorvastatin 80 milliGRAM(s) Oral at bedtime  insulin Infusion 1.5 Unit(s)/Hr IV Continuous <Continuous>    piperacillin/tazobactam IVPB. 3.375 Gram(s) IV Intermittent every 12 hours    Allergies    No Known Allergies        PE:  General: Female sitting in bed. NAD.   Vital Signs Last 24 Hrs  T(C): 37 (02 May 2018 04:00), Max: 37 (02 May 2018 04:00)  T(F): 98.6 (02 May 2018 04:00), Max: 98.6 (02 May 2018 04:00)  HR: 69 (02 May 2018 09:00) (58 - 76)  BP: 117/56 (02 May 2018 09:00) (69/34 - 165/72)  BP(mean): 81 (02 May 2018 09:00) (49 - 103)  RR: 17 (02 May 2018 09:00) (10 - 60)  SpO2: 100% (02 May 2018 09:00) (95% - 100%)  CV: S1, S2. NSR on monitor.   Abd: Soft, NT,ND,   Extremities: Warm. No edema noted.   Neuro: A&O X3    LABS:    POCT Blood Glucose.: 205 mg/dL (05-02-18 @ 07:59)  POCT Blood Glucose.: 203 mg/dL (05-02-18 @ 07:04)  POCT Blood Glucose.: 235 mg/dL (05-02-18 @ 06:02)  POCT Blood Glucose.: 193 mg/dL (05-02-18 @ 05:01)  POCT Blood Glucose.: 214 mg/dL (05-02-18 @ 04:06)  POCT Blood Glucose.: 233 mg/dL (05-02-18 @ 03:01)  POCT Blood Glucose.: 278 mg/dL (05-02-18 @ 02:01)  POCT Blood Glucose.: 263 mg/dL (05-02-18 @ 01:03)  POCT Blood Glucose.: 289 mg/dL (05-02-18 @ 00:21)  POCT Blood Glucose.: 297 mg/dL (05-02-18 @ 00:20)  POCT Blood Glucose.: 192 mg/dL (05-01-18 @ 22:59)  POCT Blood Glucose.: 157 mg/dL (05-01-18 @ 21:58)  POCT Blood Glucose.: 154 mg/dL (05-01-18 @ 21:01)  POCT Blood Glucose.: 324 mg/dL (05-01-18 @ 20:03)  POCT Blood Glucose.: 247 mg/dL (05-01-18 @ 20:01)  POCT Blood Glucose.: 164 mg/dL (05-01-18 @ 20:00)  POCT Blood Glucose.: 184 mg/dL (05-01-18 @ 19:05)  POCT Blood Glucose.: 219 mg/dL (05-01-18 @ 18:48)  POCT Blood Glucose.: 197 mg/dL (05-01-18 @ 18:13)  POCT Blood Glucose.: 211 mg/dL (05-01-18 @ 17:03)  POCT Blood Glucose.: 257 mg/dL (05-01-18 @ 15:14)  POCT Blood Glucose.: 264 mg/dL (05-01-18 @ 13:46)  POCT Blood Glucose.: 228 mg/dL (05-01-18 @ 13:11)  POCT Blood Glucose.: 145 mg/dL (05-01-18 @ 12:12)  POCT Blood Glucose.: 190 mg/dL (05-01-18 @ 11:01)  POCT Blood Glucose.: 198 mg/dL (05-01-18 @ 09:49)  POCT Blood Glucose.: 199 mg/dL (05-01-18 @ 08:40)  POCT Blood Glucose.: 212 mg/dL (05-01-18 @ 08:05)  POCT Blood Glucose.: 243 mg/dL (05-01-18 @ 07:00)  POCT Blood Glucose.: 207 mg/dL (05-01-18 @ 05:05)  POCT Blood Glucose.: 190 mg/dL (05-01-18 @ 04:01)  POCT Blood Glucose.: 142 mg/dL (05-01-18 @ 02:04)  POCT Blood Glucose.: 131 mg/dL (05-01-18 @ 00:03)  POCT Blood Glucose.: 127 mg/dL (04-30-18 @ 23:17)  POCT Blood Glucose.: 120 mg/dL (04-30-18 @ 22:08)  POCT Blood Glucose.: 133 mg/dL (04-30-18 @ 21:02)  POCT Blood Glucose.: 100 mg/dL (04-30-18 @ 20:03)  POCT Blood Glucose.: 79 mg/dL (04-30-18 @ 19:06)  POCT Blood Glucose.: 120 mg/dL (04-30-18 @ 16:53)  POCT Blood Glucose.: 234 mg/dL (04-30-18 @ 15:16)  POCT Blood Glucose.: 182 mg/dL (04-30-18 @ 13:53)  POCT Blood Glucose.: 220 mg/dL (04-30-18 @ 12:57)  POCT Blood Glucose.: 250 mg/dL (04-30-18 @ 11:04)  POCT Blood Glucose.: 247 mg/dL (04-30-18 @ 11:02)  POCT Blood Glucose.: 171 mg/dL (04-30-18 @ 09:51)  POCT Blood Glucose.: 79 mg/dL (04-30-18 @ 09:11)  POCT Blood Glucose.: 161 mg/dL (04-30-18 @ 07:55)  POCT Blood Glucose.: 233 mg/dL (04-30-18 @ 06:54)  POCT Blood Glucose.: 222 mg/dL (04-30-18 @ 06:04)  POCT Blood Glucose.: 206 mg/dL (04-30-18 @ 04:57)  POCT Blood Glucose.: 236 mg/dL (04-30-18 @ 04:14)  POCT Blood Glucose.: 292 mg/dL (04-30-18 @ 02:55)  POCT Blood Glucose.: 263 mg/dL (04-30-18 @ 02:16)  POCT Blood Glucose.: 281 mg/dL (04-30-18 @ 01:11)  POCT Blood Glucose.: 244 mg/dL (04-30-18 @ 00:02)  POCT Blood Glucose.: 268 mg/dL (04-29-18 @ 23:02)  POCT Blood Glucose.: 340 mg/dL (04-29-18 @ 22:02)  POCT Blood Glucose.: 384 mg/dL (04-29-18 @ 21:07)  POCT Blood Glucose.: 446 mg/dL (04-29-18 @ 20:09)  POCT Blood Glucose.: 511 mg/dL (04-29-18 @ 19:13)  POCT Blood Glucose.: >600 mg/dL (04-29-18 @ 18:06)  POCT Blood Glucose.: >600 mg/dL (04-29-18 @ 17:01)  POCT Blood Glucose.: >600 mg/dL (04-29-18 @ 15:51)  POCT Blood Glucose.: >600 mg/dL (04-29-18 @ 14:57)  POCT Blood Glucose.: >600 mg/dL (04-29-18 @ 13:23)  POCT Blood Glucose.: >600 mg/dL (04-29-18 @ 13:21)                            9.6    7.7   )-----------( 175      ( 02 May 2018 03:26 )             29.4       05-02    134<L>  |  89<L>  |  44<H>  ----------------------------<  161<H>  4.3   |  23  |  6.23<H>    Ca    8.0<L>      02 May 2018 09:05  Phos  5.6     05-02  Mg     2.2     05-02    TPro  6.2  /  Alb  3.6  /  TBili  0.3  /  DBili  x   /  AST  41<H>  /  ALT  23  /  AlkPhos  88  05-02        Hemoglobin A1C, Whole Blood: 7.1 % <H> [4.0 - 5.6] (03-01-18 @ 01:59)            Contact number: isabel [phone] or [phone]

## 2018-05-02 NOTE — PROGRESS NOTE ADULT - ASSESSMENT
1. s/p PTCI of recurrence restenosis  2. hemodynamically stable  3. multiple other medical issues  4. overall preserved LV functio    Recommend  continue present cardiac regimen  proceed as per medicine/renal/endo

## 2018-05-02 NOTE — PROGRESS NOTE ADULT - ASSESSMENT
61 yo female with T1DM uncontrolled with PVD s/p b/l fem-pop, CVA/TIA, CAD with MIx8 s/p PCI with stenting, and ESRD on HD here with 1 day of n/v of coffee ground/bilious fluid found to be in DKA due to insulin pump dislodgement s/p PCI w/stenting (5/1). Pt requiring higher amounts of insulin on insulin gtt at this time. BG goal (100-180mg/dl). Will convert to SQ basal/bolus as patient would like to defer until tomorrow to restart insulin pump. No evidence of further DKA at this time. Beta hydroxybuterate 0.1.

## 2018-05-02 NOTE — ADVANCED PRACTICE NURSE CONSULT - ASSESSMENT
63 yo female with T1DM uncontrolled on [employer] insulin pump with PVD s/p b/l fem-pop, CVA/TIA, CAD with MIx8 s/p PCI with stenting, and ESRD on HD admitted due to 1 day of n/v of coffee ground/bilious fluid and found to be in DKA due to insulin pump dislodgement.  Pt has insulin pump supplies with her. Pt hasn't eaten because MICU waiting on Endo recommendations but wants to eat. Insulin gtt rates has been 1-1.5 ml per hour since 2am and pt’s FS have been in the 200’s overnight and this morning.   Primary RN made aware that pt needs to complete all insulin pump forms and to help pt complete forms.  Pt will be transitioned to sq insulin or insulin pump. Endo team to follow.  Pt’s insulin pump settings as follows:     Basal     12am- 0.275 units/hour  5am - 0.375 units/hour  Total basal - 8.5 units    ICR  12am-12am - 1:15    ISF  12am - 60  7am - 40  6pm - 60    BGT  12am – 110-130 mg/dl  8am – 100-120 mg/dl    Insulin duration – 6 hours

## 2018-05-02 NOTE — PROGRESS NOTE ADULT - SUBJECTIVE AND OBJECTIVE BOX
CHIEF COMPLAINT: DKA, MOHIT on CRF, MI s/p cardiac cath s/p RCA balloon angioplasty    Interval Events: S/P RCA Balloon Angioplasty yesterday non therapeutic on Heparin gtt, Norepinephrine off this am, R femoral sheath d/c last night, troponin c/w increasing as is creatinine, and remains on an insulin gtt.     This is a 62yoF w IDDM, frequent hospitalizations for DKA, ESRD on HD 4x/wk, CAD, MI s/p PCI RCA, PVD s/p Bilateral fem pops, +SC vein stenosis s/p stent, and s/p CVA. The pt had a URI and received abx last weeks and presented  w AMS, abd pain, vomiting coffee ground and dark bilious vomitus, and c/o watery diarrhea. The pt was noted to have a NSTEMI is s/p cardiac cath yesterday and is s/p RCA balloon angioplasty.    OBJECTIVE:  ICU Vital Signs Last 24 Hrs  T(C): 37 (02 May 2018 04:00), Max: 37 (02 May 2018 04:00)  T(F): 98.6 (02 May 2018 04:00), Max: 98.6 (02 May 2018 04:00)  HR: 73 (02 May 2018 07:15) (58 - 76)  BP: 102/56 (02 May 2018 07:15) (69/34 - 196/77)  BP(mean): 74 (02 May 2018 07:15) (49 - 110)  ABP: --  ABP(mean): --  RR: 16 (02 May 2018 07:15) (10 - 60)  SpO2: 99% (02 May 2018 07:15) (95% - 100%)        05- @ 07:01  -  05-02 @ 07:00  --------------------------------------------------------  IN: 757.8 mL / OUT: 0 mL / NET: 757.8 mL      CAPILLARY BLOOD GLUCOSE  POCT Blood Glucose.: 203 mg/dL (02 May 2018 07:04)      LINES:  MAREN WALLIS's x 3    HOSPITAL MEDICATIONS:  aspirin  chewable 81 milliGRAM(s) Oral daily  clopidogrel Tablet 75 milliGRAM(s) Oral daily  heparin  Infusion. 1200 Unit(s)/Hr IV Continuous <Continuous>    piperacillin/tazobactam IVPB. 3.375 Gram(s) IV Intermittent every 12 hours    norepinephrine Infusion 0.02 MICROgram(s)/kG/Min IV Continuous <Continuous>    atorvastatin 80 milliGRAM(s) Oral at bedtime  insulin Infusion 1.5 Unit(s)/Hr IV Continuous <Continuous>    pantoprazole  Injectable 40 milliGRAM(s) IV Push every 12 hours    dextrose 5% + sodium chloride 0.45%. 1000 milliLiter(s) IV Continuous <Continuous>            LABS:                        9.6    7.7   )-----------( 175      ( 02 May 2018 03:26 )             29.4     Hgb Trend: 9.6<--, 10.3<--, 11.2<--, 12.8<--, 10.9<--  05-02    134<L>  |  89<L>  |  43<H>  ----------------------------<  230<H>  4.4   |  24  |  5.85<H>    Ca    8.0<L>      02 May 2018 03:26  Phos  5.6     05-02  Mg     2.3     05-02    TPro  6.2  /  Alb  3.6  /  TBili  0.3  /  DBili  x   /  AST  41<H>  /  ALT  23  /  AlkPhos  88  05-02    Creatinine Trend: 5.85<--, 5.39<--, 5.21<--, 5.00<--, 4.73<--, 4.22<--  PT/INR - ( 02 May 2018 03:26 )   PT: 12.4 sec;   INR: 1.14 ratio      PTT - ( 02 May 2018 03:26 )  PTT:47.8 sec      Venous Blood Gas:   @ 17:55  7.43/45/79/29/95  VBG Lactate: 0.8  Venous Blood Gas:   @ 15:24  7.33/55/41/28/70  VBG Lactate: 1.8  Venous Blood Gas:   @ 11:48  7.36/47/44/26/74  VBG Lactate: 2.5  Venous Blood Gas:   @ 07:57  7.36/52/38/29/62  VBG Lactate: 3.4    MICROBIOLOGY:     Culture - Blood (collected 2018 16:02)  Source: .Blood Blood  Preliminary Report (2018 17:01):    No growth to date.    Culture - Blood (collected 2018 16:02)  Source: .Blood Blood  Preliminary Report (2018 17:01):    No growth to date.      RADIOLOGY:    < from: CT Angio Abdomen and Pelvis w/ IV Cont (18 @ 13:25) >  EXAM:  CT ANGIO ABD PELV (W)AW IC                        PROCEDURE DATE:  2018    INTERPRETATION:  CLINICAL INFORMATION: DKA, femoropopliteal bypass,   non-ST elevated MI. Severe abdominal pain and lactic acidosis. Evaluate   for ischemia.    COMPARISON: CT abdomen pelvis 2018    FINDINGS:    LOWER CHEST: Coronary calcification. Mild bibasilar subsegmental   atelectasis.    LIVER: A subcentimeter hypervascular focus in the central right hepatic   lobe (7:26) is stable in size from prior imaging dating to 2013.  BILE DUCTS: Moderate intrahepatic biliary ductal dilatation with the CBD   measuring up to 1.5 cm, increased from prior imaging. Correlate with   LFTs. MRCP may be performed as indicated.   GALLBLADDER: Cholecystectomy.  SPLEEN: Within normal limits.  PANCREAS: Within normal limits.  ADRENALS: Within normal limits.  KIDNEYS/URETERS: Atrophic kidneys with small cysts.    BLADDER: Within normal limits.  REPRODUCTIVE ORGANS: Small uterine fibroids.    BOWEL: No bowel obstruction or definite bowel wall thickening. Appendix   is normal.  PERITONEUM: No ascites.  VESSELS:  Atherosclerotic changes of the aorta. The celiac axis is widely   patent. The SMA origin is widely patent. There are however a few foci of   mild to moderate stenosis within the mid to distal SMA. The ANGELIKA is   patent. Right external iliac stent. Partially visualized bilateral groin   bypass grafts.   RETROPERITONEUM: No lymphadenopathy.    ABDOMINAL WALL: Within normal limits.  BONES: Degenerative changes.    IMPRESSION:     A few foci of mild to moderate stenosis within the mid to distal SMA. The   celiac axis, proximal SMA and ANGELIKA are widely patent.    No bowel wall thickening to suggest ischemia.    Biliary ductal dilatation with interval increase from prior imaging.   Correlate with LFTs. MRCP may be performed as indicated      < from: US Abdomen Complete (18 @ 15:37) >    EXAM:  US ABDOMEN COMPLETE                          PROCEDURE DATE:  2018      INTERPRETATION:  CLINICAL INFORMATION: Abdominal pain, vomiting.    COMPARISON: CT abdomen pelvis dated .    TECHNIQUE: Sonography of the abdomen.     FINDINGS:    Liver: Mildly enlarged, measuring 19 cm. Normal texture. No liver mass.    Bile ducts: Normal caliber. Common bile duct measures 11 mm. Low   insertion of cystic duct. No intraductal stone seen.    Gallbladder: Status post cholecystectomy.        Pancreas: Visualized portions are within normal limits.    Spleen: 9 cm. Within normal limits.    Right kidney: 7.2 cm. No hydronephrosis. Thin echogenic cortex.    Left kidney: 6.1 cm.  No hydronephrosis. Thin echogenic cortex.    Ascites: None.    Aorta and IVC: Atheromatous aorta. IVC patent.    IMPRESSION:     Extrahepatic biliary dilatation status post cholecystectomy without   evidence of choledocholithiasis.    Atrophic kidneys.      EKG:  < from: 12 Lead ECG (18 @ 13:38) >    Ventricular Rate 82 BPM    Atrial Rate 82 BPM    P-R Interval 142 ms    QRS Duration 112 ms     ms    QTc 518 ms    P Axis 82 degrees    R Axis 82 degrees    T Axis -37 degrees    Diagnosis Line NORMAL SINUS RHYTHM  INCOMPLETE RIGHT BUNDLE BRANCH BLOCK  ST ELEVATION CONSIDER ANTERIOR INJURY OR ACUTE INFARCT  PROLONGED QT  *** ** ** ** * ACUTE MI  ** **    Echo:  < from: Transthoracic Echocardiogram (18 @ 09:26) >    Patient name: NATHAN MEEKS  YOB: 1955   Age: 62 (F)   MR#: [id number]  Study Date: 2018  Location: [place] [id number]: Roslyn Bhatt RDCS  Study quality: Technically difficult  Referring Physician: Kerrie Cuevas MD  Blood Pressure: 100/46 mmHg  Height: 160 cm  Weight: 53 kg  BSA: 1.5 m2  ------------------------------------------------------------------------  PROCEDURE: Transthoracic echocardiogram with 2-D, M-Mode  and complete spectral and color flow Doppler.  INDICATION: Chest pain, unspecified (R07.9)  ------------------------------------------------------------------------  Dimensions:    Normal Values:  LA:     2.9    2.0 - 4.0 cm  Ao:     3.0    2.0 - 3.8 cm  SEPTUM: 1.0    0.6 - 1.2 cm  PWT:    1.0    0.6 - 1.1 cm  LVIDd:  4.5    3.0 - 5.6 cm  LVIDs:  3.3    1.8 - 4.0 cm  Derived variables:  LVMI: 100 g/m2  RWT: 0.44  Fractional short: 27 %  EF (Teicholtz): 52 %  Doppler Peak Velocity (m/sec): AoV=2.1  ------------------------------------------------------------------------  Observations:  Mitral Valve: Mitral annular calcification, otherwise  normal mitral valve. Mild mitral regurgitation.  Aortic Valve/Aorta: Calcified trileaflet aortic valve with  decreased opening. Peak transaortic valve gradientequals  18 mm Hg, mean transaortic valve gradient equals 10 mm Hg,  estimated aortic valve area equals 1.2 sqcm (by continuity  equation), aortic valve velocity time integral equals 41  cm, consistent with moderate aortic stenosis. Peak left  ventricular outflow tract gradient equals 3 mm Hg, mean  gradient is equal to 2 mm Hg, LVOT velocity time integral  equals 18 cm.  Aortic Root: 3 cm.  LVOT diameter: 1.9 cm.  Left Atrium: Normal left atrium.  LA volume index = 30  cc/m2.  Left Ventricle: Mild segmental left ventricular systolic  dysfunction. The inferolateral wall, and the basal inferior  wall are hypokinetic.  Normal left ventricular internal  dimensions and wall thicknesses.  Right Heart: Right atrium not well visualized. The right  ventricle is not well visualized; grossly normal right  ventricular systolic function. Normal tricuspid valve. Mild  tricuspid regurgitation. Pulmonic valve not well  visualized, probably normal.  Pericardium/Pleura: Normal pericardium with no pericardial  effusion.  Hemodynamic: Estimated right atrial pressure is 8 mm Hg.  Estimated right ventricular systolic pressure equals 40 mm  Hg, assuming right atrial pressure equals 8 mm Hg,  consistent with mild pulmonary hypertension.  ------------------------------------------------------------------------  Conclusions:  1. Mitral annular calcification, otherwise normal mitral  valve.  2. Calcified trileaflet aortic valve with decreased  opening. Peak transaortic valve gradient equals 18 mm Hg,  mean transaortic valve gradient equals 10 mm Hg, estimated  aortic valve area equals 1.2 sqcm (by continuity equation),  aortic valve velocity time integral equals 41 cm,  consistent with moderate aortic stenosis.  3. Mild segmental left ventricular systolic dysfunction.  The inferolateral wall, and the basal inferior wall are  hypokinetic.  4. The right ventricle is not well visualized; grossly  normal right ventricular systolic function.  5. Estimated pulmonary artery systolic pressure equals 40  mm Hg, assuming right atrial pressure equals 8 mm Hg,  consistent with mild pulmonary pressures.  *** Compared with echocardiogram of 2017, Aortic  stenosis is now moderat    < from: Cardiac Cath Lab - Adult (18 @ 12:08) >    [facility]  Department of Cardiology  [address] [place] [address]  [phone]  Cath Lab Report -- Comprehensive Report  Patient: NATHAN MEEKS  Study date: 2018  Account number: [id number]  MR number: [id number]  : 1955  Gender: Female  Race: W  Case Physician(s):  RACH Lenz M.D.  Referring Physician:  Tee Biswas MD  INDICATIONS: Subsequent NSTEMI.  HISTORY: The patient has a history of coronary artery disease.The patient  has hypertension, insulin-controlled diabetes, dialysis-treated renal  failure, and medication-treated dyslipidemia.  PROCEDURE:  --  Left coronary angiography.  --  Right coronary angiography.  --  Sonosite - Diagnostic.  --  Intervention on ostial RCA: cutting balloon.  --  Intervention on RPLS: balloon angioplasty.  TECHNIQUE: The risks and alternatives of the procedures and conscious  sedation were explained to the patient and informed consent was obtained.  Cardiac catheterization performed. Cardiac catheterization performed  electively. Coronary intervention performed. Coronary intervention  performed electively.  Local anesthetic given. Right femoral artery access. Left coronary artery  angiography. The vessel was injectedutilizing a catheter. Right coronary  artery angiography. The vessel was injected utilizing a catheter. Sonosite  - Diagnostic. RADIATION EXPOSURE: 18.7 min. A cutting balloon angioplasty  was performed on the 99 % lesion in the ostial RCA. Followingintervention  there was a 1 % residual stenosis. There was no dissection. Vessel setup  was performed. A 6FR JR3.5 LAUNCHER guiding catheter was used to intubate  the vessel. Vessel setup was performed. A COUGAR 190 WIRE wire was used to  cross the lesion. Balloon angioplasty was performed, using a 3.00 X 15 NC  APEX balloon, with 2 inflations and a maximum inflation pressure of 14  lyle. Cutting balloon angioplasty was performed, with 3 inflations and a  maximum inflation pressure of 12 lyle. Balloon angioplasty was performed,  using a 4.00 X 15 NC APEX balloon, with 5 inflations and a maximum  inflation pressure of 17 lyle. A balloon angioplasty was performed on the  85 % lesion in the right posterolateral segment. Following intervention  there was a 1 % residual stenosis. There was no dissection. Vessel setup  was performed. A 6FR JR3.5 LAUNCHER guiding catheter was used to intubate  the vessel. Vessel setup was performed. A COUGAR 190 WIRE wire was used to  cross the lesion. Balloon angioplasty was performed, using a 2.50 X 12 NC  APEX balloon, with 2 inflations and a maximum inflation pressure of 16  lyle.  CONTRAST GIVEN: Omnipaque 31 ml. Omnipaque 79 ml.  MEDICATIONS GIVEN: Midazolam, 0.5 mg, IV. Heparin, 4000 units, IV.  Clopidogrel (Plavix), 150 mg, PO. Aspirin, 81 mg, PO. Cardene, 125 mcg.  VENTRICLES: No left ventriculogram was performed. echo LVEF=52%.  inferobasal hypokinesia  CORONARY VESSELS: The coronary circulation is right dominant.  LM:   --  LM: The vessel was calcified. Angiography showed minor luminal  irregularities with no flow limiting lesions.  LAD:   --  LAD: The vessel was calcified. Angiography showed minor luminal  irregularities with no flow limiting lesions.  CX:   --  Proximal circumflex: There wasa 100 % stenosis.  RCA:   --  RCA: Angiography showed minor luminal irregularities with no  flow limiting lesions. Patent proimal and mid and distal stents  --  Ostial RCA: There was a 99 % stenosis. The lesion was irregularly  contoured, hazy, eccentric, and calcified.  --  Proximal RCA: There was a 99 % stenosis.  --  RPLS: There was a 85 % stenosis at the site of a prior stent.  --  RPL1: There was a 85 % stenosis.  COMPLICATIONS: There were no complications.  DIAGNOSTIC IMPRESSIONS: Patient has restenosis of ostial RCA severe and  restenosis of distal RPLS, s/p Non ST elevation MI  DIAGNOSTIC RECOMMENDATIONS: PTCI of RCA  INTERVENTIONAL IMPRESSIONS: PTCA of ostial RCA with cutting balloon and  PTCA of RPLS successfully performed as described  INTERVENTIONAL RECOMMENDATIONS: ASA, plavix statin continued risk factor  modification. Dialysis as per renal  Prepared and signed by  Mauro Vela M.D.  Signed 2018 13:30:39  HEMODYNAMIC TABLES  Pressures:  Baseline  Pressures:  - HR: 74  Pressures:  - Rhythm:  Pressures:  -- Aortic Pressure (S/D/M): 115/48/72  Outputs:  Baseline  Outputs:  -- CALCULATIONS: Age in years: 62.80  Outputs:  -- CALCULATIONS: Body Surface Area: 1.54  Outputs:  -- CALCULATIONS: Height in cm: 160.00  Outputs:  -- CALCULATIONS: Sex: Female  Outputs:  -- CALCULATIONS: Weight in k.00

## 2018-05-02 NOTE — ADVANCED PRACTICE NURSE CONSULT - RECOMMEDATIONS
Insulin pump forms to be completed and Primary RN made aware.  S/S, prevention and appropriate treatment of hypo-hyperglycemia reviewed with pt and verbalized understanding obtained via the teach-back method.  Primary RN to f/u with BG monitoring, making sure pt consumes carb consistent meals, insulin pump site assessment, monitoring S/S hypo/hyperglycemia and tracking carb intake, meal/correction boluses once pt reconnected to insulin pump.

## 2018-05-02 NOTE — CHART NOTE - NSCHARTNOTEFT_GEN_A_CORE
Contact note.    Plan for pt to start back on insulin pump tomorrow, currently on correction and scheduled humalog before breakfast and dinner. , 147, 205, 203. On full liquid diet, requesting solid food. Recommend advancing diet to consistent carbohydrate. Will f/u on po intake

## 2018-05-02 NOTE — PROGRESS NOTE ADULT - SUBJECTIVE AND OBJECTIVE BOX
MICU Transfer Note    Transfer from: MICU    Transfer to: ( x ) Medicine    (  ) Telemetry     (   ) RCU        (    ) Palliative         (   ) Stroke Unit          (   ) __________________    Accepting physician: Dr. Viera      MICU COURSE:          ASSESSMENT & PLAN:             For Followup:          Vital Signs Last 24 Hrs  T(C): 36.8 (02 May 2018 16:00), Max: 37 (02 May 2018 04:00)  T(F): 98.3 (02 May 2018 16:00), Max: 98.6 (02 May 2018 04:00)  HR: 70 (02 May 2018 16:00) (58 - 74)  BP: 134/62 (02 May 2018 16:00) (69/34 - 165/72)  BP(mean): 89 (02 May 2018 16:00) (49 - 103)  RR: 17 (02 May 2018 16:00) (10 - 113)  SpO2: 100% (02 May 2018 16:00) (98% - 100%)  I&O's Summary    01 May 2018 07:01  -  02 May 2018 07:00  --------------------------------------------------------  IN: 757.8 mL / OUT: 0 mL / NET: 757.8 mL    02 May 2018 07:01  -  02 May 2018 17:32  --------------------------------------------------------  IN: 26 mL / OUT: 0 mL / NET: 26 mL        MEDICATIONS  (STANDING):  aspirin  chewable 81 milliGRAM(s) Oral daily  atorvastatin 80 milliGRAM(s) Oral at bedtime  clopidogrel Tablet 75 milliGRAM(s) Oral daily  dextrose 5%. 1000 milliLiter(s) (50 mL/Hr) IV Continuous <Continuous>  dextrose 50% Injectable 12.5 Gram(s) IV Push once  dextrose 50% Injectable 25 Gram(s) IV Push once  dextrose 50% Injectable 25 Gram(s) IV Push once  epoetin azalea Injectable 13833 Unit(s) IV Push once  insulin lispro (HumaLOG) corrective regimen sliding scale   SubCutaneous four times a day before meals  insulin lispro Injectable (HumaLOG) 4 Unit(s) SubCutaneous before breakfast  insulin lispro Injectable (HumaLOG) 4 Unit(s) SubCutaneous before lunch  insulin lispro Injectable (HumaLOG) 4 Unit(s) SubCutaneous before dinner  pantoprazole  Injectable 40 milliGRAM(s) IV Push every 12 hours  piperacillin/tazobactam IVPB. 3.375 Gram(s) IV Intermittent every 12 hours    MEDICATIONS  (PRN):  dextrose Gel 1 Dose(s) Oral once PRN Blood Glucose LESS THAN 70 milliGRAM(s)/deciliter  glucagon  Injectable 1 milliGRAM(s) IntraMuscular once PRN Glucose LESS THAN 70 milligrams/deciliter        LABS                                            9.6                   Neurophils% (auto):   x      (05-02 @ 03:26):    7.7  )-----------(175          Lymphocytes% (auto):  x                                             29.4                   Eosinphils% (auto):   x        Manual%: Neutrophils x    ; Lymphocytes x    ; Eosinophils x    ; Bands%: x    ; Blasts x                                    134    |  89     |  44                  Calcium: 8.0   / iCa: x      (05-02 @ 09:05)    ----------------------------<  161       Magnesium: 2.2                              4.3     |  23     |  6.23             Phosphorous: 5.6      TPro  6.2    /  Alb  3.6    /  TBili  0.3    /  DBili  x      /  AST  41     /  ALT  23     /  AlkPhos  88     02 May 2018 03:26    ( 05-02 @ 10:29 )   PT: x    ;   INR: x      aPTT: > 200 sec MICU Transfer Note    Transfer from: MICU    Transfer to: ( x ) Medicine    (  ) Telemetry     (   ) RCU        (    ) Palliative         (   ) Stroke Unit          (   ) __________________    Accepting physician: Dr. Viera      MICU COURSE:    This is a 62yoF w IDDM, frequent hospitalizations for DKA, ESRD on HD 4x/wk, CAD, MI s/p PCI RCA, PVD s/p Bilateral fem pops, +SC vein stenosis s/p stent, and s/p CVA. The pt had a URI and received abx last weeks and presented 4/29 w AMS, abd pain, vomiting coffee ground and dark bilious vomitus, and c/o watery diarrhea. The pt was noted to have a NSTEMI is s/p cardiac cath yesterday and is s/p RCA balloon angioplasty.      ASSESSMENT & PLAN:   DKA Resolved, Remains hemodynamically stable, HD performed today and tolerated well.  -Neuro: No neuro issues - c/w neuro checks as per protocol  -CV: Remains HD stable-Heparin gtt off, c/w plavix / Asa / statin, f/u cardiology and interventional cardiology  -Pulm: No pulmonary issues- c/w monitoring  -Renal: ESRD  on HD 4 x week- HD as per Renal  -Endocrine- +IDDM- DKA resolved, Insulin gtt off, on Lantus/Humolog/Low dose HISS- Insulin pump to be started by endocrine tomorrow        For Followup:  Endocrine- start insulin pump / f/u for further diabetic management  Renal f/u by renal for HD  Cardiology- f/u s/p Balloon angio plasty       Vital Signs Last 24 Hrs  T(C): 36.8 (02 May 2018 16:00), Max: 37 (02 May 2018 04:00)  T(F): 98.3 (02 May 2018 16:00), Max: 98.6 (02 May 2018 04:00)  HR: 70 (02 May 2018 16:00) (58 - 74)  BP: 134/62 (02 May 2018 16:00) (69/34 - 165/72)  BP(mean): 89 (02 May 2018 16:00) (49 - 103)  RR: 17 (02 May 2018 16:00) (10 - 113)  SpO2: 100% (02 May 2018 16:00) (98% - 100%)  I&O's Summary    01 May 2018 07:01  -  02 May 2018 07:00  --------------------------------------------------------  IN: 757.8 mL / OUT: 0 mL / NET: 757.8 mL    02 May 2018 07:01  -  02 May 2018 17:32  --------------------------------------------------------  IN: 26 mL / OUT: 0 mL / NET: 26 mL        MEDICATIONS  (STANDING):  aspirin  chewable 81 milliGRAM(s) Oral daily  atorvastatin 80 milliGRAM(s) Oral at bedtime  clopidogrel Tablet 75 milliGRAM(s) Oral daily  dextrose 5%. 1000 milliLiter(s) (50 mL/Hr) IV Continuous <Continuous>  dextrose 50% Injectable 12.5 Gram(s) IV Push once  dextrose 50% Injectable 25 Gram(s) IV Push once  dextrose 50% Injectable 25 Gram(s) IV Push once  epoetin azalea Injectable 13371 Unit(s) IV Push once  insulin lispro (HumaLOG) corrective regimen sliding scale   SubCutaneous four times a day before meals  insulin lispro Injectable (HumaLOG) 4 Unit(s) SubCutaneous before breakfast  insulin lispro Injectable (HumaLOG) 4 Unit(s) SubCutaneous before lunch  insulin lispro Injectable (HumaLOG) 4 Unit(s) SubCutaneous before dinner  pantoprazole  Injectable 40 milliGRAM(s) IV Push every 12 hours  piperacillin/tazobactam IVPB. 3.375 Gram(s) IV Intermittent every 12 hours    MEDICATIONS  (PRN):  dextrose Gel 1 Dose(s) Oral once PRN Blood Glucose LESS THAN 70 milliGRAM(s)/deciliter  glucagon  Injectable 1 milliGRAM(s) IntraMuscular once PRN Glucose LESS THAN 70 milligrams/deciliter        LABS                                            9.6                   Neurophils% (auto):   x      (05-02 @ 03:26):    7.7  )-----------(175          Lymphocytes% (auto):  x                                             29.4                   Eosinphils% (auto):   x        Manual%: Neutrophils x    ; Lymphocytes x    ; Eosinophils x    ; Bands%: x    ; Blasts x                                    134    |  89     |  44                  Calcium: 8.0   / iCa: x      (05-02 @ 09:05)    ----------------------------<  161       Magnesium: 2.2                              4.3     |  23     |  6.23             Phosphorous: 5.6      TPro  6.2    /  Alb  3.6    /  TBili  0.3    /  DBili  x      /  AST  41     /  ALT  23     /  AlkPhos  88     02 May 2018 03:26    ( 05-02 @ 10:29 )   PT: x    ;   INR: x      aPTT: > 200 sec MICU Transfer Note    Transfer from: MICU    Transfer to: ( x ) Medicine    (  ) Telemetry     (   ) RCU        (    ) Palliative         (   ) Stroke Unit          (   ) __________________    Accepting physician: Dr. Viera      MICU COURSE:    This is a 62yoF w IDDM, frequent hospitalizations for DKA, ESRD on HD 4x/wk, CAD, MI s/p PCI RCA, PVD s/p Bilateral fem pops, +SC vein stenosis s/p stent, and s/p CVA. The pt had a URI and received abx last weeks and presented 4/29 w AMS, abd pain, vomiting coffee ground and dark bilious vomitus, and c/o watery diarrhea. The pt was noted to have a NSTEMI is s/p cardiac cath yesterday and is s/p RCA balloon angioplasty. Pt s/p DKA protocol - off of the insulin gtt x 2 days, insulin pump restarted by endocrine today, HD done 5/2- removed 1.5 L. Pt remains hemodynamically stable and stable for transfer to the floor      ASSESSMENT & PLAN:   DKA Resolved, Remains hemodynamically stable, HD performed today and tolerated well.  -Neuro: No neuro issues - c/w neuro checks as per protocol  -CV: Remains HD stable-Heparin gtt off, c/w plavix / Asa / statin, f/u cardiology and interventional cardiology  -Pulm: No pulmonary issues- c/w monitoring  -Renal: ESRD  on HD 4 x week- HD as per Renal  -Endocrine- +IDDM- DKA resolved, Insulin gtt off, on Lantus/Humolog/Low dose HISS- Insulin pump re-started by endocrine today 5/3        For Followup:  Endocrine- F/U insulin pump / f/u for further diabetic management  Renal f/u by renal for HD  Cardiology- f/u s/p Balloon angio plasty - c/w plavix / asa-       Vital Signs Last 24 Hrs  T(C): 36.8 (02 May 2018 16:00), Max: 37 (02 May 2018 04:00)  T(F): 98.3 (02 May 2018 16:00), Max: 98.6 (02 May 2018 04:00)  HR: 70 (02 May 2018 16:00) (58 - 74)  BP: 134/62 (02 May 2018 16:00) (69/34 - 165/72)  BP(mean): 89 (02 May 2018 16:00) (49 - 103)  RR: 17 (02 May 2018 16:00) (10 - 113)  SpO2: 100% (02 May 2018 16:00) (98% - 100%)  I&O's Summary    01 May 2018 07:01  -  02 May 2018 07:00  --------------------------------------------------------  IN: 757.8 mL / OUT: 0 mL / NET: 757.8 mL    02 May 2018 07:01  -  02 May 2018 17:32  --------------------------------------------------------  IN: 26 mL / OUT: 0 mL / NET: 26 mL        MEDICATIONS  (STANDING):  aspirin  chewable 81 milliGRAM(s) Oral daily  atorvastatin 80 milliGRAM(s) Oral at bedtime  clopidogrel Tablet 75 milliGRAM(s) Oral daily  dextrose 5%. 1000 milliLiter(s) (50 mL/Hr) IV Continuous <Continuous>  dextrose 50% Injectable 12.5 Gram(s) IV Push once  dextrose 50% Injectable 25 Gram(s) IV Push once  dextrose 50% Injectable 25 Gram(s) IV Push once  epoetin azalea Injectable 45179 Unit(s) IV Push once  insulin lispro (HumaLOG) corrective regimen sliding scale   SubCutaneous four times a day before meals  insulin lispro Injectable (HumaLOG) 4 Unit(s) SubCutaneous before breakfast  insulin lispro Injectable (HumaLOG) 4 Unit(s) SubCutaneous before lunch  insulin lispro Injectable (HumaLOG) 4 Unit(s) SubCutaneous before dinner  pantoprazole  Injectable 40 milliGRAM(s) IV Push every 12 hours  piperacillin/tazobactam IVPB. 3.375 Gram(s) IV Intermittent every 12 hours    MEDICATIONS  (PRN):  dextrose Gel 1 Dose(s) Oral once PRN Blood Glucose LESS THAN 70 milliGRAM(s)/deciliter  glucagon  Injectable 1 milliGRAM(s) IntraMuscular once PRN Glucose LESS THAN 70 milligrams/deciliter        LABS                                            9.6                   Neurophils% (auto):   x      (05-02 @ 03:26):    7.7  )-----------(175          Lymphocytes% (auto):  x                                             29.4                   Eosinphils% (auto):   x        Manual%: Neutrophils x    ; Lymphocytes x    ; Eosinophils x    ; Bands%: x    ; Blasts x                                    134    |  89     |  44                  Calcium: 8.0   / iCa: x      (05-02 @ 09:05)    ----------------------------<  161       Magnesium: 2.2                              4.3     |  23     |  6.23             Phosphorous: 5.6      TPro  6.2    /  Alb  3.6    /  TBili  0.3    /  DBili  x      /  AST  41     /  ALT  23     /  AlkPhos  88     02 May 2018 03:26    ( 05-02 @ 10:29 )   PT: x    ;   INR: x      aPTT: > 200 sec

## 2018-05-02 NOTE — PROGRESS NOTE ADULT - ATTENDING COMMENTS
Agree with above. Patient seen and examined. Patient critically ill and requiring MICU care.  -DKA - endocrine followup. Plan to transition to basal/bolus insulin today - monitor BMP and FS  -ESRD - on HD - for HD today. Renal followup  -Abdominal pain - Left sided resolved - patient now hungry  -CAD - cardiology evaluation noted - s/p cath yesterday with intervention  -PVD and subclavian stenosis     Critical Care Time 35 minutes

## 2018-05-02 NOTE — PROGRESS NOTE ADULT - SUBJECTIVE AND OBJECTIVE BOX
Patient is a 62y old  Female who presents with a chief complaint of DKA/ Hypotension (29 Apr 2018 15:00)      SUBJECTIVE / OVERNIGHT EVENTS: lethargic, being transitioned off insulin drip    MEDICATIONS  (STANDING):  aspirin  chewable 81 milliGRAM(s) Oral daily  atorvastatin 80 milliGRAM(s) Oral at bedtime  clopidogrel Tablet 75 milliGRAM(s) Oral daily  dextrose 5%. 1000 milliLiter(s) (50 mL/Hr) IV Continuous <Continuous>  dextrose 50% Injectable 12.5 Gram(s) IV Push once  dextrose 50% Injectable 25 Gram(s) IV Push once  dextrose 50% Injectable 25 Gram(s) IV Push once  epoetin azalea Injectable 21406 Unit(s) IV Push once  insulin lispro (HumaLOG) corrective regimen sliding scale   SubCutaneous four times a day before meals  insulin lispro Injectable (HumaLOG) 4 Unit(s) SubCutaneous before breakfast  insulin lispro Injectable (HumaLOG) 4 Unit(s) SubCutaneous before lunch  insulin lispro Injectable (HumaLOG) 4 Unit(s) SubCutaneous before dinner  pantoprazole  Injectable 40 milliGRAM(s) IV Push every 12 hours  piperacillin/tazobactam IVPB. 3.375 Gram(s) IV Intermittent every 12 hours    MEDICATIONS  (PRN):  dextrose Gel 1 Dose(s) Oral once PRN Blood Glucose LESS THAN 70 milliGRAM(s)/deciliter  glucagon  Injectable 1 milliGRAM(s) IntraMuscular once PRN Glucose LESS THAN 70 milligrams/deciliter      Vital Signs Last 24 Hrs  T(F): 98.2 (05-02-18 @ 12:00), Max: 98.6 (05-02-18 @ 04:00)  HR: 70 (05-02-18 @ 16:00) (58 - 74)  BP: 134/62 (05-02-18 @ 16:00) (69/34 - 165/72)  RR: 17 (05-02-18 @ 16:00) (10 - 113)  SpO2: 100% (05-02-18 @ 16:00) (95% - 100%)  Telemetry:   CAPILLARY BLOOD GLUCOSE      POCT Blood Glucose.: 106 mg/dL (02 May 2018 15:44)  POCT Blood Glucose.: 136 mg/dL (02 May 2018 11:56)  POCT Blood Glucose.: 147 mg/dL (02 May 2018 10:14)  POCT Blood Glucose.: >600 mg/dL (02 May 2018 10:12)  POCT Blood Glucose.: 205 mg/dL (02 May 2018 07:59)  POCT Blood Glucose.: 203 mg/dL (02 May 2018 07:04)  POCT Blood Glucose.: 235 mg/dL (02 May 2018 06:02)  POCT Blood Glucose.: 193 mg/dL (02 May 2018 05:01)  POCT Blood Glucose.: 214 mg/dL (02 May 2018 04:06)  POCT Blood Glucose.: 233 mg/dL (02 May 2018 03:01)  POCT Blood Glucose.: 278 mg/dL (02 May 2018 02:01)  POCT Blood Glucose.: 263 mg/dL (02 May 2018 01:03)  POCT Blood Glucose.: 289 mg/dL (02 May 2018 00:21)  POCT Blood Glucose.: 297 mg/dL (02 May 2018 00:20)  POCT Blood Glucose.: 192 mg/dL (01 May 2018 22:59)  POCT Blood Glucose.: 157 mg/dL (01 May 2018 21:58)  POCT Blood Glucose.: 154 mg/dL (01 May 2018 21:01)  POCT Blood Glucose.: 324 mg/dL (01 May 2018 20:03)  POCT Blood Glucose.: 247 mg/dL (01 May 2018 20:01)  POCT Blood Glucose.: 164 mg/dL (01 May 2018 20:00)  POCT Blood Glucose.: 184 mg/dL (01 May 2018 19:05)  POCT Blood Glucose.: 219 mg/dL (01 May 2018 18:48)  POCT Blood Glucose.: 197 mg/dL (01 May 2018 18:13)  POCT Blood Glucose.: 211 mg/dL (01 May 2018 17:03)    I&O's Summary    01 May 2018 07:01  -  02 May 2018 07:00  --------------------------------------------------------  IN: 757.8 mL / OUT: 0 mL / NET: 757.8 mL    02 May 2018 07:01  -  02 May 2018 16:15  --------------------------------------------------------  IN: 26 mL / OUT: 0 mL / NET: 26 mL        PHYSICAL EXAM:  GENERAL: NAD, well-developed  HEAD:  Atraumatic, Normocephalic  EYES: EOMI, PERRLA, conjunctiva and sclera clear  NECK: Supple, No JVD  CHEST/LUNG: Clear to auscultation bilaterally; No wheeze  HEART: Regular rate and rhythm; No murmurs, rubs, or gallops  ABDOMEN: Soft, Nontender, Nondistended; Bowel sounds present  EXTREMITIES:  2+ Peripheral Pulses, No clubbing, cyanosis, or edema  PSYCH: AAOx3  NEUROLOGY: non-focal  SKIN: No rashes or lesions    LABS:                        9.6    7.7   )-----------( 175      ( 02 May 2018 03:26 )             29.4     05-02    134<L>  |  89<L>  |  44<H>  ----------------------------<  161<H>  4.3   |  23  |  6.23<H>    Ca    8.0<L>      02 May 2018 09:05  Phos  5.6     05-02  Mg     2.2     05-02    TPro  6.2  /  Alb  3.6  /  TBili  0.3  /  DBili  x   /  AST  41<H>  /  ALT  23  /  AlkPhos  88  05-02    PT/INR - ( 02 May 2018 03:26 )   PT: 12.4 sec;   INR: 1.14 ratio         PTT - ( 02 May 2018 10:29 )  PTT:> 200 sec  CARDIAC MARKERS ( 02 May 2018 09:05 )  x     / 11.33 ng/mL / 127 U/L / x     / 4.6 ng/mL  CARDIAC MARKERS ( 01 May 2018 23:31 )  x     / 10.28 ng/mL / 194 U/L / x     / 6.5 ng/mL  CARDIAC MARKERS ( 01 May 2018 15:42 )  x     / 8.74 ng/mL / 236 U/L / x     / 9.1 ng/mL  CARDIAC MARKERS ( 01 May 2018 02:13 )  x     / 8.27 ng/mL / 404 U/L / x     / 19.6 ng/mL  CARDIAC MARKERS ( 30 Apr 2018 18:16 )  x     / 8.84 ng/mL / 846 U/L / x     / 49.1 ng/mL          RADIOLOGY & ADDITIONAL TESTS:    Imaging Personally Reviewed:    Consultant(s) Notes Reviewed:      Care Discussed with Consultants/Other Providers:

## 2018-05-02 NOTE — PROGRESS NOTE ADULT - RS GEN PE MLT RESP DETAILS PC
good air movement/respirations non-labored/no wheezes/clear to auscultation bilaterally/no rhonchi/no chest wall tenderness/no intercostal retractions/no rales/breath sounds equal/airway patent/no subcutaneous emphysema

## 2018-05-03 DIAGNOSIS — Z46.81 ENCOUNTER FOR FITTING AND ADJUSTMENT OF INSULIN PUMP: ICD-10-CM

## 2018-05-03 LAB
ALBUMIN SERPL ELPH-MCNC: 4 G/DL — SIGNIFICANT CHANGE UP (ref 3.3–5)
ALP SERPL-CCNC: 96 U/L — SIGNIFICANT CHANGE UP (ref 40–120)
ALT FLD-CCNC: 25 U/L — SIGNIFICANT CHANGE UP (ref 10–45)
ANION GAP SERPL CALC-SCNC: 16 MMOL/L — SIGNIFICANT CHANGE UP (ref 5–17)
ANION GAP SERPL CALC-SCNC: 18 MMOL/L — HIGH (ref 5–17)
AST SERPL-CCNC: 35 U/L — SIGNIFICANT CHANGE UP (ref 10–40)
B-OH-BUTYR SERPL-SCNC: 1.8 MMOL/L — HIGH
BASE EXCESS BLDV CALC-SCNC: 4.7 MMOL/L — HIGH (ref -2–2)
BILIRUB SERPL-MCNC: 0.4 MG/DL — SIGNIFICANT CHANGE UP (ref 0.2–1.2)
BUN SERPL-MCNC: 12 MG/DL — SIGNIFICANT CHANGE UP (ref 7–23)
BUN SERPL-MCNC: 15 MG/DL — SIGNIFICANT CHANGE UP (ref 7–23)
CA-I SERPL-SCNC: 1.06 MMOL/L — LOW (ref 1.12–1.3)
CALCIUM SERPL-MCNC: 8.2 MG/DL — LOW (ref 8.4–10.5)
CALCIUM SERPL-MCNC: 8.3 MG/DL — LOW (ref 8.4–10.5)
CHLORIDE BLDV-SCNC: 96 MMOL/L — SIGNIFICANT CHANGE UP (ref 96–108)
CHLORIDE SERPL-SCNC: 94 MMOL/L — LOW (ref 96–108)
CHLORIDE SERPL-SCNC: 94 MMOL/L — LOW (ref 96–108)
CO2 BLDV-SCNC: 31 MMOL/L — HIGH (ref 22–30)
CO2 SERPL-SCNC: 24 MMOL/L — SIGNIFICANT CHANGE UP (ref 22–31)
CO2 SERPL-SCNC: 27 MMOL/L — SIGNIFICANT CHANGE UP (ref 22–31)
CREAT SERPL-MCNC: 2.6 MG/DL — HIGH (ref 0.5–1.3)
CREAT SERPL-MCNC: 3.18 MG/DL — HIGH (ref 0.5–1.3)
GAS PNL BLDV: 137 MMOL/L — SIGNIFICANT CHANGE UP (ref 136–145)
GAS PNL BLDV: SIGNIFICANT CHANGE UP
GAS PNL BLDV: SIGNIFICANT CHANGE UP
GLUCOSE BLDC GLUCOMTR-MCNC: 186 MG/DL — HIGH (ref 70–99)
GLUCOSE BLDC GLUCOMTR-MCNC: 250 MG/DL — HIGH (ref 70–99)
GLUCOSE BLDC GLUCOMTR-MCNC: 299 MG/DL — HIGH (ref 70–99)
GLUCOSE BLDC GLUCOMTR-MCNC: 308 MG/DL — HIGH (ref 70–99)
GLUCOSE BLDC GLUCOMTR-MCNC: 341 MG/DL — HIGH (ref 70–99)
GLUCOSE BLDC GLUCOMTR-MCNC: 401 MG/DL — HIGH (ref 70–99)
GLUCOSE BLDC GLUCOMTR-MCNC: 463 MG/DL — CRITICAL HIGH (ref 70–99)
GLUCOSE BLDV-MCNC: 91 MG/DL — SIGNIFICANT CHANGE UP (ref 70–99)
GLUCOSE SERPL-MCNC: 122 MG/DL — HIGH (ref 70–99)
GLUCOSE SERPL-MCNC: 92 MG/DL — SIGNIFICANT CHANGE UP (ref 70–99)
HCO3 BLDV-SCNC: 29 MMOL/L — SIGNIFICANT CHANGE UP (ref 21–29)
HCT VFR BLD CALC: 30.8 % — LOW (ref 34.5–45)
HCT VFR BLDA CALC: 30 % — LOW (ref 39–50)
HGB BLD CALC-MCNC: 9.8 G/DL — LOW (ref 11.5–15.5)
HGB BLD-MCNC: 10.3 G/DL — LOW (ref 11.5–15.5)
HOROWITZ INDEX BLDV+IHG-RTO: 21 — SIGNIFICANT CHANGE UP
LACTATE BLDV-MCNC: 0.8 MMOL/L — SIGNIFICANT CHANGE UP (ref 0.7–2)
MAGNESIUM SERPL-MCNC: 2 MG/DL — SIGNIFICANT CHANGE UP (ref 1.6–2.6)
MAGNESIUM SERPL-MCNC: 2.1 MG/DL — SIGNIFICANT CHANGE UP (ref 1.6–2.6)
MCHC RBC-ENTMCNC: 33.4 GM/DL — SIGNIFICANT CHANGE UP (ref 32–36)
MCHC RBC-ENTMCNC: 33.6 PG — SIGNIFICANT CHANGE UP (ref 27–34)
MCV RBC AUTO: 100 FL — SIGNIFICANT CHANGE UP (ref 80–100)
OTHER CELLS CSF MANUAL: 8 ML/DL — LOW (ref 18–22)
PCO2 BLDV: 46 MMHG — SIGNIFICANT CHANGE UP (ref 35–50)
PH BLDV: 7.42 — SIGNIFICANT CHANGE UP (ref 7.35–7.45)
PHOSPHATE SERPL-MCNC: 3.1 MG/DL — SIGNIFICANT CHANGE UP (ref 2.5–4.5)
PHOSPHATE SERPL-MCNC: 4.1 MG/DL — SIGNIFICANT CHANGE UP (ref 2.5–4.5)
PLATELET # BLD AUTO: 168 K/UL — SIGNIFICANT CHANGE UP (ref 150–400)
PO2 BLDV: 34 MMHG — SIGNIFICANT CHANGE UP (ref 25–45)
POTASSIUM BLDV-SCNC: 3.3 MMOL/L — LOW (ref 3.5–5)
POTASSIUM SERPL-MCNC: 3.4 MMOL/L — LOW (ref 3.5–5.3)
POTASSIUM SERPL-MCNC: 4.7 MMOL/L — SIGNIFICANT CHANGE UP (ref 3.5–5.3)
POTASSIUM SERPL-SCNC: 3.4 MMOL/L — LOW (ref 3.5–5.3)
POTASSIUM SERPL-SCNC: 4.7 MMOL/L — SIGNIFICANT CHANGE UP (ref 3.5–5.3)
PROT SERPL-MCNC: 7 G/DL — SIGNIFICANT CHANGE UP (ref 6–8.3)
RBC # BLD: 3.06 M/UL — LOW (ref 3.8–5.2)
RBC # FLD: 12.6 % — SIGNIFICANT CHANGE UP (ref 10.3–14.5)
SAO2 % BLDV: 61 % — LOW (ref 67–88)
SODIUM SERPL-SCNC: 136 MMOL/L — SIGNIFICANT CHANGE UP (ref 135–145)
SODIUM SERPL-SCNC: 137 MMOL/L — SIGNIFICANT CHANGE UP (ref 135–145)
WBC # BLD: 5.7 K/UL — SIGNIFICANT CHANGE UP (ref 3.8–10.5)
WBC # FLD AUTO: 5.7 K/UL — SIGNIFICANT CHANGE UP (ref 3.8–10.5)

## 2018-05-03 PROCEDURE — 99233 SBSQ HOSP IP/OBS HIGH 50: CPT

## 2018-05-03 PROCEDURE — 99233 SBSQ HOSP IP/OBS HIGH 50: CPT | Mod: GC

## 2018-05-03 PROCEDURE — 12345: CPT | Mod: NC

## 2018-05-03 RX ORDER — INSULIN LISPRO 100/ML
1 VIAL (ML) SUBCUTANEOUS
Qty: 0 | Refills: 0 | Status: DISCONTINUED | OUTPATIENT
Start: 2018-05-03 | End: 2018-05-05

## 2018-05-03 RX ORDER — PIPERACILLIN AND TAZOBACTAM 4; .5 G/20ML; G/20ML
3.38 INJECTION, POWDER, LYOPHILIZED, FOR SOLUTION INTRAVENOUS EVERY 12 HOURS
Qty: 0 | Refills: 0 | Status: COMPLETED | OUTPATIENT
Start: 2018-05-03 | End: 2018-05-05

## 2018-05-03 RX ORDER — PANTOPRAZOLE SODIUM 20 MG/1
40 TABLET, DELAYED RELEASE ORAL
Qty: 0 | Refills: 0 | Status: DISCONTINUED | OUTPATIENT
Start: 2018-05-03 | End: 2018-05-05

## 2018-05-03 RX ADMIN — PANTOPRAZOLE SODIUM 40 MILLIGRAM(S): 20 TABLET, DELAYED RELEASE ORAL at 06:38

## 2018-05-03 RX ADMIN — Medication 4 UNIT(S): at 08:51

## 2018-05-03 RX ADMIN — ATORVASTATIN CALCIUM 80 MILLIGRAM(S): 80 TABLET, FILM COATED ORAL at 21:30

## 2018-05-03 RX ADMIN — Medication 1 EACH: at 10:00

## 2018-05-03 RX ADMIN — Medication 81 MILLIGRAM(S): at 11:57

## 2018-05-03 RX ADMIN — PIPERACILLIN AND TAZOBACTAM 25 GRAM(S): 4; .5 INJECTION, POWDER, LYOPHILIZED, FOR SOLUTION INTRAVENOUS at 10:01

## 2018-05-03 RX ADMIN — Medication 4: at 08:51

## 2018-05-03 RX ADMIN — CLOPIDOGREL BISULFATE 75 MILLIGRAM(S): 75 TABLET, FILM COATED ORAL at 11:57

## 2018-05-03 RX ADMIN — PIPERACILLIN AND TAZOBACTAM 25 GRAM(S): 4; .5 INJECTION, POWDER, LYOPHILIZED, FOR SOLUTION INTRAVENOUS at 21:30

## 2018-05-03 NOTE — PROGRESS NOTE ADULT - ASSESSMENT
62 yr old female with PMHx Insulin dependent DM (on insulin pump) with frequent hospitalization for DKA with last hospitalization from 2/28/18 to 3/3/18. PMHx also includes ESRD on HD (M/T/TH/Sa) last dialysis yesterday 4/28 and friday 4/27, HTN, HLD, HFrEF (40 to 45%), CAD, MI, s/p PCI RCA PVD s/p Lt and Rt fem pop bypasses, subclavian vein stenosis s/p stent, CVA. with N/V of dark brown bile x 4 to 6 episodes yesterday with coffee ground vomitus x2 in ambulance. now with severe life threatening DKA as well as ekg changes.  1- esrd  2- MI  3- DKA/DM   4- hx htn       hd am  coronary angio and stent placement   hyperglycemia improving . cont with   continue with metoprolol er 25mg daily   restart low dose ace-I  cont with insulin pump although has been very difficult to keep this pt out of dka

## 2018-05-03 NOTE — PROGRESS NOTE ADULT - ASSESSMENT
63 yo female with T1DM uncontrolled with PVD s/p b/l fem-pop, CVA/TIA, CAD with MIx8 s/p PCI with stenting, and ESRD on HD here with 1 day of n/v of coffee ground/bilious fluid found to be in DKA due to insulin pump dislodgement s/p PCI w/stenting (5/1). Pt on basal/bolus therapy but now to transition back to pump. Restarting pump w/home insulin pump settings. BG goal (100-180mg/dl). Educated patient regarding role juice has on hyperglycemia.

## 2018-05-03 NOTE — PROGRESS NOTE ADULT - SUBJECTIVE AND OBJECTIVE BOX
[facility]   [phone]  RENAL FOLLOW UP NOTE  --------------------------------------------------------------------------------  Chief Complaint:    24 hour events/subjective:    seen earlier. still in icu. states feels better.     PAST HISTORY  --------------------------------------------------------------------------------  No significant changes to PMH, PSH, FHx, SHx, unless otherwise noted    ALLERGIES & MEDICATIONS  --------------------------------------------------------------------------------  Allergies    No Known Allergies    Intolerances      Standing Inpatient Medications  aspirin  chewable 81 milliGRAM(s) Oral daily  atorvastatin 80 milliGRAM(s) Oral at bedtime  clopidogrel Tablet 75 milliGRAM(s) Oral daily  dextrose 5%. 1000 milliLiter(s) IV Continuous <Continuous>  dextrose 50% Injectable 12.5 Gram(s) IV Push once  dextrose 50% Injectable 25 Gram(s) IV Push once  dextrose 50% Injectable 25 Gram(s) IV Push once  insulin lispro (HumaLOG) Pump 1 Each SubCutaneous Continuous Pump  pantoprazole    Tablet 40 milliGRAM(s) Oral before breakfast  piperacillin/tazobactam IVPB. 3.375 Gram(s) IV Intermittent every 12 hours    PRN Inpatient Medications  dextrose Gel 1 Dose(s) Oral once PRN  glucagon  Injectable 1 milliGRAM(s) IntraMuscular once PRN      REVIEW OF SYSTEMS  --------------------------------------------------------------------------------    Gen: denies fevers/chills,  CVS: denies chest pain/palpitations  Resp: denies SOB/Cough  GI: Denies N/V/Abd pain  : Denies dysuria  decrease vision baseline per pt     All other systems were reviewed and are negative, except as noted.    VITALS/PHYSICAL EXAM  --------------------------------------------------------------------------------  T(C): 36.4 (05-03-18 @ 18:32), Max: 37.1 (05-03-18 @ 04:00)  HR: 76 (05-03-18 @ 18:32) (63 - 90)  BP: 130/85 (05-03-18 @ 18:32) (96/46 - 163/73)  RR: 19 (05-03-18 @ 18:32) (18 - 32)  SpO2: 100% (05-03-18 @ 16:00) (98% - 100%)  Wt(kg): --        05-02-18 @ 07:01  -  05-03-18 @ 07:00  --------------------------------------------------------  IN: 896 mL / OUT: 1500 mL / NET: -604 mL      Physical Exam:  	  Gen: alert oriented   	Pulm: Decreased breath sounds b/l bases no rales or ronchi  	CV: RRR, S1/S2  	Abd: +BS, soft, nontender/nondistended  	Extremity: No cyanosis, no edema no clubbing  		  	  LABS/STUDIES  --------------------------------------------------------------------------------              10.3   5.7   >-----------<  168      [05-02-18 @ 23:47]              30.8     136  |  94  |  15  ----------------------------<  122      [05-03-18 @ 04:40]  4.7   |  24  |  3.18        Ca     8.2     [05-03-18 @ 04:40]      Mg     2.1     [05-03-18 @ 04:40]      Phos  4.1     [05-03-18 @ 04:40]    TPro  7.0  /  Alb  4.0  /  TBili  0.4  /  DBili  x   /  AST  35  /  ALT  25  /  AlkPhos  96  [05-02-18 @ 23:47]    PT/INR: PT 12.4 , INR 1.14       [05-02-18 @ 03:26]  PTT: > 200      [05-02-18 @ 10:29]    Troponin 10.06      [05-02-18 @ 17:35]        [05-02-18 @ 17:35]    Creatinine Trend:  SCr 3.18 [05-03 @ 04:40]  SCr 2.60 [05-02 @ 23:47]  SCr 2.50 [05-02 @ 21:26]  SCr 6.45 [05-02 @ 17:35]  SCr 6.23 [05-02 @ 09:05]                  PTH -- (Ca 8.3)      [03-03-18 @ 08:53]   134  HbA1c 7.1      [03-01-18 @ 01:59]  TSH 1.07      [12-19-17 @ 06:12]  Lipid: chol 113, TG 86, HDL 59, LDL 37      [04-30-18 @ 06:44]

## 2018-05-03 NOTE — PROGRESS NOTE ADULT - SUBJECTIVE AND OBJECTIVE BOX
Patient is a 62y old  Female who presents with a chief complaint of DKA/ Hypotension (29 Apr 2018 15:00)      SUBJECTIVE / OVERNIGHT EVENTS: Comfortable without new complaints.   Review of Systems  chest pain no  palpitations no  sob no  nausea no  headache no    MEDICATIONS  (STANDING):  aspirin  chewable 81 milliGRAM(s) Oral daily  atorvastatin 80 milliGRAM(s) Oral at bedtime  clopidogrel Tablet 75 milliGRAM(s) Oral daily  dextrose 5%. 1000 milliLiter(s) (50 mL/Hr) IV Continuous <Continuous>  dextrose 50% Injectable 12.5 Gram(s) IV Push once  dextrose 50% Injectable 25 Gram(s) IV Push once  dextrose 50% Injectable 25 Gram(s) IV Push once  insulin lispro (HumaLOG) Pump 1 Each SubCutaneous Continuous Pump  pantoprazole    Tablet 40 milliGRAM(s) Oral before breakfast  piperacillin/tazobactam IVPB. 3.375 Gram(s) IV Intermittent every 12 hours    MEDICATIONS  (PRN):  dextrose Gel 1 Dose(s) Oral once PRN Blood Glucose LESS THAN 70 milliGRAM(s)/deciliter  glucagon  Injectable 1 milliGRAM(s) IntraMuscular once PRN Glucose LESS THAN 70 milligrams/deciliter          PHYSICAL EXAM:  GENERAL: NAD  HEAD:  Atraumatic, Normocephalic  EYES: EOMI, PERRLA, conjunctiva and sclera clear  NECK: Supple, No JVD  CHEST/LUNG: Clear to auscultation bilaterally; No wheeze  HEART: Regular rate and rhythm; No murmurs, rubs, or gallops  ABDOMEN: Soft, Nontender, Nondistended; Bowel sounds present  EXTREMITIES:  2+ Peripheral Pulses, No clubbing, cyanosis, or edema  PSYCH: AAOx3  NEUROLOGY: non-focal  SKIN: No rashes or lesions    LABS:                        10.3   5.7   )-----------( 168      ( 02 May 2018 23:47 )             30.8     05-03    136  |  94<L>  |  15  ----------------------------<  122<H>  4.7   |  24  |  3.18<H>    Ca    8.2<L>      03 May 2018 04:40  Phos  4.1     05-03  Mg     2.1     05-03    TPro  7.0  /  Alb  4.0  /  TBili  0.4  /  DBili  x   /  AST  35  /  ALT  25  /  AlkPhos  96  05-02    PT/INR - ( 02 May 2018 03:26 )   PT: 12.4 sec;   INR: 1.14 ratio         PTT - ( 02 May 2018 10:29 )  PTT:> 200 sec  CARDIAC MARKERS ( 02 May 2018 17:35 )  x     / 10.06 ng/mL / 115 U/L / x     / 3.6 ng/mL  CARDIAC MARKERS ( 02 May 2018 09:05 )  x     / 11.33 ng/mL / 127 U/L / x     / 4.6 ng/mL  CARDIAC MARKERS ( 01 May 2018 23:31 )  x     / 10.28 ng/mL / 194 U/L / x     / 6.5 ng/mL          RADIOLOGY & ADDITIONAL TESTS:    Imaging Personally Reviewed:    Consultant(s) Notes Reviewed:      Care Discussed with Consultants/Other Providers:

## 2018-05-03 NOTE — PROGRESS NOTE ADULT - ASSESSMENT
62yoF known IDDM admitted in DKA, MOHIT on CRF, and a NSTEMI s/p RCI balloon angioplasty, who remains on insulin and heparin gtts.     Neuro: AMS resolved as DKA is resolving  Mental status now improve and back at baseline    CV: Hx MI, CAD s/p PCI Ostial RCA for recurrence stenosis, HTN, now a/w NSTEMI s/p RCA Balloon Angioplasty  -presently on a Heparin gtt- now post procedure - D/C  -c/w plavix , asa, statin  -reintroduce beta blockers as tolerated  -f/u cardiology and interventional cardiology    Pulm: No active pulmonary issues  -c/w monitoring  -chest PT / Incentive spirometry / out of bed to chair    Endocrine: Insulin Dependent Diabetes- uses an insulin pump at home, now a/w DKA- DKA resoloved  -PO diet  -start Lantus 12U now  -start pre-meal humolog- 4U pre-meal  -low dose insulin sliding scale  -c/w dka protocol- insulin gtt over an additional 2 hours s/p lantus administration- than d/c  -f/u endocrine    Renal: ESRD on HD 4 x week , MOHIT on CKD  -f/u renal - HD as per renal    R/O GIB- no active bleeding / H/H stable / no further n/v, no melena or brbpr  -c/w protonix twice daily for now  -concern for Biliary Dilatation     Sepsis: RVP neg, BC negative, possible aspiration in the setting of having an AMS during ketoacidosis event  -c/w assessing for infection  -c/w zosyn x 5 day total  Pierce Viera MD pager [phone]

## 2018-05-03 NOTE — PROGRESS NOTE ADULT - ASSESSMENT
62yoF w a PMHX IDDM w recurrent admissions for DKA, ESRD on HD, MI s/p Cardiac stents, PVD s/p bi fem pops, SVC stenosis s/p stent.  Now admitted 4/29 encephalopathic w +AG DKA, MOHIT on CKF, and now with a NSTEMI- 2/2 re-occlusion of the RCA.  DKA protocol followed now transitioned to Insulin pump by endocrine. HD performed yesterday and removed 1.5 L. Pt remains hemodynamically stable and ready top 62yoF w a PMHX IDDM w recurrent admissions for DKA, ESRD on HD, MI s/p Cardiac stents, PVD s/p bi fem pops, SVC stenosis s/p stent.  Now admitted 4/29 encephalopathic w +AG DKA, MOHIT on CKF, and now with a NSTEMI- 2/2 re-occlusion of the RCA.  DKA protocol followed now transitioned to Insulin pump by endocrine. HD performed yesterday and removed 1.5 L. Pt remains hemodynamically stable and ready to be transferred to the medical floor.    Neuro: No neurological issues  c/w neuro checks as per protocol    CV: HD stable, s/p RCA balloon angioplasty  -c/w plavix, asa  -f/u cardiology    Pulm: NO pulmonary issues  -c/w monitoring oxygenation status    ID: on zosyn prophylactically for aspiration as pt had been complaining of vomiting prior to admission-   -consider d/c zosyn    Renal: ESRD on HD 4 x week- last HD 5/2  -HD as per renal    Endocrine: S/P DKA s/p DKA protocol, Pt Insulin pump restarted by Endocrine today  -c/w Insulin Pump and c/w Endocrines orders  -f/u endocrine

## 2018-05-03 NOTE — PROGRESS NOTE ADULT - SUBJECTIVE AND OBJECTIVE BOX
Diabetes Follow up note:  Interval Hx:        Review of Systems:  General:  GI: Tolerating POs without any N/V/D/ABD PAIN.  CV: No CP/SOB  ENDO: No S&Sx of hypoglycemia  MEDS:  atorvastatin 80 milliGRAM(s) Oral at bedtime    insulin lispro (HumaLOG) corrective regimen sliding scale   SubCutaneous four times a day before meals  insulin lispro Injectable (HumaLOG) 4 Unit(s) SubCutaneous before breakfast  insulin lispro Injectable (HumaLOG) 4 Unit(s) SubCutaneous before lunch  insulin lispro Injectable (HumaLOG) 4 Unit(s) SubCutaneous before dinner    piperacillin/tazobactam IVPB. 3.375 Gram(s) IV Intermittent every 12 hours    Allergies    No Known Allergies        PE:  General:  Vital Signs Last 24 Hrs  T(C): 36.5 (03 May 2018 08:00), Max: 37.1 (03 May 2018 04:00)  T(F): 97.7 (03 May 2018 08:00), Max: 98.8 (03 May 2018 04:00)  HR: 76 (03 May 2018 08:00) (63 - 76)  BP: 146/66 (03 May 2018 08:00) (96/46 - 160/70)  BP(mean): 95 (03 May 2018 08:00) (63 - 100)  RR: 26 (03 May 2018 08:00) (14 - 113)  SpO2: 100% (03 May 2018 08:00) (98% - 100%)  Abd: Soft, NT,ND,   Extremities: Warm  Neuro: A&O X3    LABS:    POCT Blood Glucose.: 308 mg/dL (05-03-18 @ 08:47)  POCT Blood Glucose.: 463 mg/dL (05-03-18 @ 08:45)  POCT Blood Glucose.: 299 mg/dL (05-03-18 @ 08:43)  POCT Blood Glucose.: 104 mg/dL (05-02-18 @ 21:10)  POCT Blood Glucose.: 90 mg/dL (05-02-18 @ 21:09)  POCT Blood Glucose.: 106 mg/dL (05-02-18 @ 15:44)  POCT Blood Glucose.: 136 mg/dL (05-02-18 @ 11:56)  POCT Blood Glucose.: 147 mg/dL (05-02-18 @ 10:14)  POCT Blood Glucose.: >600 mg/dL (05-02-18 @ 10:12)  POCT Blood Glucose.: 205 mg/dL (05-02-18 @ 07:59)  POCT Blood Glucose.: 203 mg/dL (05-02-18 @ 07:04)  POCT Blood Glucose.: 235 mg/dL (05-02-18 @ 06:02)  POCT Blood Glucose.: 193 mg/dL (05-02-18 @ 05:01)  POCT Blood Glucose.: 214 mg/dL (05-02-18 @ 04:06)  POCT Blood Glucose.: 233 mg/dL (05-02-18 @ 03:01)  POCT Blood Glucose.: 278 mg/dL (05-02-18 @ 02:01)  POCT Blood Glucose.: 263 mg/dL (05-02-18 @ 01:03)  POCT Blood Glucose.: 289 mg/dL (05-02-18 @ 00:21)  POCT Blood Glucose.: 297 mg/dL (05-02-18 @ 00:20)  POCT Blood Glucose.: 192 mg/dL (05-01-18 @ 22:59)  POCT Blood Glucose.: 157 mg/dL (05-01-18 @ 21:58)  POCT Blood Glucose.: 154 mg/dL (05-01-18 @ 21:01)  POCT Blood Glucose.: 324 mg/dL (05-01-18 @ 20:03)  POCT Blood Glucose.: 247 mg/dL (05-01-18 @ 20:01)  POCT Blood Glucose.: 164 mg/dL (05-01-18 @ 20:00)  POCT Blood Glucose.: 184 mg/dL (05-01-18 @ 19:05)  POCT Blood Glucose.: 219 mg/dL (05-01-18 @ 18:48)  POCT Blood Glucose.: 197 mg/dL (05-01-18 @ 18:13)  POCT Blood Glucose.: 211 mg/dL (05-01-18 @ 17:03)  POCT Blood Glucose.: 257 mg/dL (05-01-18 @ 15:14)  POCT Blood Glucose.: 264 mg/dL (05-01-18 @ 13:46)  POCT Blood Glucose.: 228 mg/dL (05-01-18 @ 13:11)  POCT Blood Glucose.: 145 mg/dL (05-01-18 @ 12:12)  POCT Blood Glucose.: 190 mg/dL (05-01-18 @ 11:01)  POCT Blood Glucose.: 198 mg/dL (05-01-18 @ 09:49)  POCT Blood Glucose.: 199 mg/dL (05-01-18 @ 08:40)  POCT Blood Glucose.: 212 mg/dL (05-01-18 @ 08:05)  POCT Blood Glucose.: 243 mg/dL (05-01-18 @ 07:00)  POCT Blood Glucose.: 207 mg/dL (05-01-18 @ 05:05)  POCT Blood Glucose.: 190 mg/dL (05-01-18 @ 04:01)  POCT Blood Glucose.: 142 mg/dL (05-01-18 @ 02:04)  POCT Blood Glucose.: 131 mg/dL (05-01-18 @ 00:03)  POCT Blood Glucose.: 127 mg/dL (04-30-18 @ 23:17)  POCT Blood Glucose.: 120 mg/dL (04-30-18 @ 22:08)  POCT Blood Glucose.: 133 mg/dL (04-30-18 @ 21:02)  POCT Blood Glucose.: 100 mg/dL (04-30-18 @ 20:03)  POCT Blood Glucose.: 79 mg/dL (04-30-18 @ 19:06)  POCT Blood Glucose.: 120 mg/dL (04-30-18 @ 16:53)  POCT Blood Glucose.: 234 mg/dL (04-30-18 @ 15:16)  POCT Blood Glucose.: 182 mg/dL (04-30-18 @ 13:53)  POCT Blood Glucose.: 220 mg/dL (04-30-18 @ 12:57)  POCT Blood Glucose.: 250 mg/dL (04-30-18 @ 11:04)  POCT Blood Glucose.: 247 mg/dL (04-30-18 @ 11:02)  POCT Blood Glucose.: 171 mg/dL (04-30-18 @ 09:51)                            10.3   5.7   )-----------( 168      ( 02 May 2018 23:47 )             30.8       05-03    136  |  94<L>  |  15  ----------------------------<  122<H>  4.7   |  24  |  3.18<H>    Ca    8.2<L>      03 May 2018 04:40  Phos  4.1     05-03  Mg     2.1     05-03    TPro  7.0  /  Alb  4.0  /  TBili  0.4  /  DBili  x   /  AST  35  /  ALT  25  /  AlkPhos  96  05-02      Thyroid Function Tests:      Hemoglobin A1C, Whole Blood: 7.1 % <H> [4.0 - 5.6] (03-01-18 @ 01:59)            Contact number: isabel [phone] or [phone] Diabetes Follow up note:  Interval Hx:  62 year old female w/uncontrolled T1DM w/complications (well known to team from prior admissions) here w/n/v found to be in DKA w/pump dislodged s/p PCI. Pt started on basal/bolus yesterday. BG values at goal until this AM-pt reports drinking apple juice prior to AM fingerstick. Denies hypoglycemic symptoms at the time. Pt wishes to restart pump and go home today. Assisted pt at bedside w/restarting pump.       Review of Systems:  General: "I want to go home"  GI: Tolerating POs without any N/V/D/ABD PAIN.  CV: No CP/SOB  ENDO: No S&Sx of hypoglycemia  MEDS:  atorvastatin 80 milliGRAM(s) Oral at bedtime    insulin lispro (HumaLOG) corrective regimen sliding scale   SubCutaneous four times a day before meals  insulin lispro Injectable (HumaLOG) 4 Unit(s) SubCutaneous before breakfast  insulin lispro Injectable (HumaLOG) 4 Unit(s) SubCutaneous before lunch  insulin lispro Injectable (HumaLOG) 4 Unit(s) SubCutaneous before dinner    piperacillin/tazobactam IVPB. 3.375 Gram(s) IV Intermittent every 12 hours    Allergies    No Known Allergies        PE:  General: Female sitting in bed. NAD>   Vital Signs Last 24 Hrs  T(C): 36.5 (03 May 2018 08:00), Max: 37.1 (03 May 2018 04:00)  T(F): 97.7 (03 May 2018 08:00), Max: 98.8 (03 May 2018 04:00)  HR: 76 (03 May 2018 08:00) (63 - 76)  BP: 146/66 (03 May 2018 08:00) (96/46 - 160/70)  BP(mean): 95 (03 May 2018 08:00) (63 - 100)  RR: 26 (03 May 2018 08:00) (14 - 113)  SpO2: 100% (03 May 2018 08:00) (98% - 100%)  CV: S1, S2. NSR on monitor.   Abd: Soft, NT,ND, Pump site c/d/i L abdomen.   Extremities: Warm  Neuro: A&O X3    LABS:    POCT Blood Glucose.: 308 mg/dL (05-03-18 @ 08:47)  POCT Blood Glucose.: 299 mg/dL (05-03-18 @ 08:43)  POCT Blood Glucose.: 104 mg/dL (05-02-18 @ 21:10)  POCT Blood Glucose.: 90 mg/dL (05-02-18 @ 21:09)  POCT Blood Glucose.: 106 mg/dL (05-02-18 @ 15:44)  POCT Blood Glucose.: 136 mg/dL (05-02-18 @ 11:56)  POCT Blood Glucose.: 147 mg/dL (05-02-18 @ 10:14)  POCT Blood Glucose.: 205 mg/dL (05-02-18 @ 07:59)  POCT Blood Glucose.: 203 mg/dL (05-02-18 @ 07:04)  POCT Blood Glucose.: 235 mg/dL (05-02-18 @ 06:02)  POCT Blood Glucose.: 193 mg/dL (05-02-18 @ 05:01)  POCT Blood Glucose.: 214 mg/dL (05-02-18 @ 04:06)  POCT Blood Glucose.: 233 mg/dL (05-02-18 @ 03:01)  POCT Blood Glucose.: 278 mg/dL (05-02-18 @ 02:01)  POCT Blood Glucose.: 263 mg/dL (05-02-18 @ 01:03)  POCT Blood Glucose.: 289 mg/dL (05-02-18 @ 00:21)  POCT Blood Glucose.: 297 mg/dL (05-02-18 @ 00:20)  POCT Blood Glucose.: 192 mg/dL (05-01-18 @ 22:59)  POCT Blood Glucose.: 157 mg/dL (05-01-18 @ 21:58)  POCT Blood Glucose.: 154 mg/dL (05-01-18 @ 21:01)  POCT Blood Glucose.: 324 mg/dL (05-01-18 @ 20:03)  POCT Blood Glucose.: 247 mg/dL (05-01-18 @ 20:01)  POCT Blood Glucose.: 164 mg/dL (05-01-18 @ 20:00)  POCT Blood Glucose.: 184 mg/dL (05-01-18 @ 19:05)  POCT Blood Glucose.: 219 mg/dL (05-01-18 @ 18:48)  POCT Blood Glucose.: 197 mg/dL (05-01-18 @ 18:13)  POCT Blood Glucose.: 211 mg/dL (05-01-18 @ 17:03)  POCT Blood Glucose.: 257 mg/dL (05-01-18 @ 15:14)  POCT Blood Glucose.: 264 mg/dL (05-01-18 @ 13:46)  POCT Blood Glucose.: 228 mg/dL (05-01-18 @ 13:11)  POCT Blood Glucose.: 145 mg/dL (05-01-18 @ 12:12)  POCT Blood Glucose.: 190 mg/dL (05-01-18 @ 11:01)  POCT Blood Glucose.: 198 mg/dL (05-01-18 @ 09:49)  POCT Blood Glucose.: 199 mg/dL (05-01-18 @ 08:40)  POCT Blood Glucose.: 212 mg/dL (05-01-18 @ 08:05)  POCT Blood Glucose.: 243 mg/dL (05-01-18 @ 07:00)  POCT Blood Glucose.: 207 mg/dL (05-01-18 @ 05:05)  POCT Blood Glucose.: 190 mg/dL (05-01-18 @ 04:01)  POCT Blood Glucose.: 142 mg/dL (05-01-18 @ 02:04)  POCT Blood Glucose.: 131 mg/dL (05-01-18 @ 00:03)  POCT Blood Glucose.: 127 mg/dL (04-30-18 @ 23:17)  POCT Blood Glucose.: 120 mg/dL (04-30-18 @ 22:08)  POCT Blood Glucose.: 133 mg/dL (04-30-18 @ 21:02)  POCT Blood Glucose.: 100 mg/dL (04-30-18 @ 20:03)  POCT Blood Glucose.: 79 mg/dL (04-30-18 @ 19:06)  POCT Blood Glucose.: 120 mg/dL (04-30-18 @ 16:53)  POCT Blood Glucose.: 234 mg/dL (04-30-18 @ 15:16)  POCT Blood Glucose.: 182 mg/dL (04-30-18 @ 13:53)  POCT Blood Glucose.: 220 mg/dL (04-30-18 @ 12:57)  POCT Blood Glucose.: 250 mg/dL (04-30-18 @ 11:04)  POCT Blood Glucose.: 247 mg/dL (04-30-18 @ 11:02)  POCT Blood Glucose.: 171 mg/dL (04-30-18 @ 09:51)                            10.3   5.7   )-----------( 168      ( 02 May 2018 23:47 )             30.8       05-03    136  |  94<L>  |  15  ----------------------------<  122<H>  4.7   |  24  |  3.18<H>    Ca    8.2<L>      03 May 2018 04:40  Phos  4.1     05-03  Mg     2.1     05-03    TPro  7.0  /  Alb  4.0  /  TBili  0.4  /  DBili  x   /  AST  35  /  ALT  25  /  AlkPhos  96  05-02      Hemoglobin A1C, Whole Blood: 7.1 % <H> [4.0 - 5.6] (03-01-18 @ 01:59)            Contact number: isabel [phone] or [phone]

## 2018-05-03 NOTE — PROGRESS NOTE ADULT - ATTENDING COMMENTS
Agree with above. Patient seen and examined. Patient requiring MICU care.  -DKA - endocrine followup. Plan to transition to insulin pump today. Patient still with slight AG but with decrease in beta huydroxybutyrate level  -ESRD - on HD - HD yesterday  -Abdominal pain - Left sided resolved - patient tolerating PO  -CAD - cardiology evaluation noted - s/p cath on this admission    -PVD and subclavian stenosis    Stable for transfer to the medical floors. Agree with above. Patient seen and examined. Patient requiring MICU care.  -DKA - endocrine followup. Plan to transition to insulin pump today. Patient still with slight AG but with decrease in beta huydroxybutyrate level  -ESRD - on HD - HD yesterday  -Abdominal pain - Left sided resolved - patient tolerating PO  -CAD - cardiology evaluation noted - s/p cath on this admission    -PVD and subclavian stenosis      Stable for transfer to the medical floors.

## 2018-05-03 NOTE — PROGRESS NOTE ADULT - SUBJECTIVE AND OBJECTIVE BOX
CHIEF COMPLAINT: DKA, MOHIT on CRF, MI / ACS s/p Balloon Angioplasty RCA    Interval Events:    REVIEW OF SYSTEMS:  Constitutional: [ ] negative [ ] fevers [ ] chills [ ] weight loss [ ] weight gain  HEENT: [ ] negative [ ] dry eyes [ ] eye irritation [ ] postnasal drip [ ] nasal congestion  CV: [ ] negative  [ ] chest pain [ ] orthopnea [ ] palpitations [ ] murmur  Resp: [ ] negative [ ] cough [ ] shortness of breath [ ] dyspnea [ ] wheezing [ ] sputum [ ] hemoptysis  GI: [ ] negative [ ] nausea [ ] vomiting [ ] diarrhea [ ] constipation [ ] abd pain [ ] dysphagia   : [ ] negative [ ] dysuria [ ] nocturia [ ] hematuria [ ] increased urinary frequency  Musculoskeletal: [ ] negative [ ] back pain [ ] myalgias [ ] arthralgias [ ] fracture  Skin: [ ] negative [ ] rash [ ] itch  Neurological: [ ] negative [ ] headache [ ] dizziness [ ] syncope [ ] weakness [ ] numbness  Psychiatric: [ ] negative [ ] anxiety [ ] depression  Endocrine: [ ] negative [ ] diabetes [ ] thyroid problem  Hematologic/Lymphatic: [ ] negative [ ] anemia [ ] bleeding problem  Allergic/Immunologic: [ ] negative [ ] itchy eyes [ ] nasal discharge [ ] hives [ ] angioedema  [ ] All other systems negative  [ ] Unable to assess ROS because ________    OBJECTIVE:  ICU Vital Signs Last 24 Hrs  T(C): 37.1 (03 May 2018 04:00), Max: 37.1 (03 May 2018 04:00)  T(F): 98.8 (03 May 2018 04:00), Max: 98.8 (03 May 2018 04:00)  HR: 64 (03 May 2018 04:00) (63 - 73)  BP: 96/46 (03 May 2018 04:00) (85/47 - 160/70)  BP(mean): 66 (03 May 2018 04:00) (63 - 100)  ABP: --  ABP(mean): --  RR: 21 (03 May 2018 04:00) (12 - 113)  SpO2: 99% (03 May 2018 04:00) (98% - 100%)        05-01 @ 07:01  -  05-02 @ 07:00  --------------------------------------------------------  IN: 757.8 mL / OUT: 0 mL / NET: 757.8 mL    05-02 @ 07:01  -  05-03 @ 05:48  --------------------------------------------------------  IN: 676 mL / OUT: 1500 mL / NET: -824 mL      CAPILLARY BLOOD GLUCOSE      POCT Blood Glucose.: 104 mg/dL (02 May 2018 21:10)      PHYSICAL EXAM:  General:   HEENT:   Lymph Nodes:  Neck:   Respiratory:   Cardiovascular:   Abdomen:   Extremities:   Skin:   Neurological:  Psychiatry:    LINES:    HOSPITAL MEDICATIONS:  aspirin  chewable 81 milliGRAM(s) Oral daily  clopidogrel Tablet 75 milliGRAM(s) Oral daily    piperacillin/tazobactam IVPB. 3.375 Gram(s) IV Intermittent every 12 hours      atorvastatin 80 milliGRAM(s) Oral at bedtime  dextrose 50% Injectable 12.5 Gram(s) IV Push once  dextrose 50% Injectable 25 Gram(s) IV Push once  dextrose 50% Injectable 25 Gram(s) IV Push once  dextrose Gel 1 Dose(s) Oral once PRN  glucagon  Injectable 1 milliGRAM(s) IntraMuscular once PRN  insulin lispro (HumaLOG) corrective regimen sliding scale   SubCutaneous four times a day before meals  insulin lispro Injectable (HumaLOG) 4 Unit(s) SubCutaneous before breakfast  insulin lispro Injectable (HumaLOG) 4 Unit(s) SubCutaneous before lunch  insulin lispro Injectable (HumaLOG) 4 Unit(s) SubCutaneous before dinner        pantoprazole  Injectable 40 milliGRAM(s) IV Push every 12 hours        dextrose 5%. 1000 milliLiter(s) IV Continuous <Continuous>            LABS:                        10.3   5.7   )-----------( 168      ( 02 May 2018 23:47 )             30.8     Hgb Trend: 10.3<--, 10.3<--, 9.6<--, 10.3<--, 11.2<--  05-03    136  |  94<L>  |  15  ----------------------------<  122<H>  4.7   |  24  |  3.18<H>    Ca    8.2<L>      03 May 2018 04:40  Phos  4.1     05-03  Mg     2.1     05-03    TPro  7.0  /  Alb  4.0  /  TBili  0.4  /  DBili  x   /  AST  35  /  ALT  25  /  AlkPhos  96  05-02    Creatinine Trend: 3.18<--, 2.60<--, 2.50<--, 6.45<--, 6.23<--, 5.85<--  PT/INR - ( 02 May 2018 03:26 )   PT: 12.4 sec;   INR: 1.14 ratio         PTT - ( 02 May 2018 10:29 )  PTT:> 200 sec      Venous Blood Gas:  05-02 @ 23:42  7.42/46/34/29/61  VBG Lactate: 0.8  Venous Blood Gas:  05-02 @ 09:03  7.40/44/40/26/69  VBG Lactate: 1.2      MICROBIOLOGY:     RADIOLOGY:  [ ] Reviewed and interpreted by me    EKG: CHIEF COMPLAINT: DKA, MOHIT on CRF, MI / ACS s/p Balloon Angioplasty RCA    Interval Events: S/P HD last night removed 1.5L. No overnight events    HPI: 62yoF w a HX of IDDM s/p frequent admissions for DKA, ESRD on HD M/T/Th/S, HTN, HLD, HFrEF 40-45%, CAD s/p MI s/p PCI RCA, PVD s/p Reji Fem Pop bypasses, +SCV stenosis s/p stent, +CVA. Now admitted with DKA s/p DKA protocol- now on SQ insulin, ESRD s/p HD last evening, +MI - re-occlusion RCA s/p balloon angioplasty.     REVIEW OF SYSTEMS:  Constitutional: [ ] negative [ ] fevers [ ] chills [ ] weight loss [ ] weight gain  HEENT: [ ] negative [ ] dry eyes [ ] eye irritation [ ] postnasal drip [ ] nasal congestion  CV: [ ] negative  [ ] chest pain [ ] orthopnea [ ] palpitations [ ] murmur  Resp: [ ] negative [ ] cough [ ] shortness of breath [ ] dyspnea [ ] wheezing [ ] sputum [ ] hemoptysis  GI: [ ] negative [ ] nausea [ ] vomiting [ ] diarrhea [ ] constipation [ ] abd pain [ ] dysphagia   : [ ] negative [ ] dysuria [ ] nocturia [ ] hematuria [ ] increased urinary frequency  Musculoskeletal: [ ] negative [ ] back pain [ ] myalgias [ ] arthralgias [ ] fracture  Skin: [ ] negative [ ] rash [ ] itch  Neurological: [ ] negative [ ] headache [ ] dizziness [ ] syncope [ ] weakness [ ] numbness  Psychiatric: [ ] negative [ ] anxiety [ ] depression  Endocrine: [ ] negative [ ] diabetes [ ] thyroid problem  Hematologic/Lymphatic: [ ] negative [ ] anemia [ ] bleeding problem  Allergic/Immunologic: [ ] negative [ ] itchy eyes [ ] nasal discharge [ ] hives [ ] angioedema  [ ] All other systems negative  [ ] Unable to assess ROS because ________    OBJECTIVE:  ICU Vital Signs Last 24 Hrs  T(C): 37.1 (03 May 2018 04:00), Max: 37.1 (03 May 2018 04:00)  T(F): 98.8 (03 May 2018 04:00), Max: 98.8 (03 May 2018 04:00)  HR: 64 (03 May 2018 04:00) (63 - 73)  BP: 96/46 (03 May 2018 04:00) (85/47 - 160/70)  BP(mean): 66 (03 May 2018 04:00) (63 - 100)  ABP: --  ABP(mean): --  RR: 21 (03 May 2018 04:00) (12 - 113)  SpO2: 99% (03 May 2018 04:00) (98% - 100%)        05-01 @ 07:01  -  05-02 @ 07:00  --------------------------------------------------------  IN: 757.8 mL / OUT: 0 mL / NET: 757.8 mL    05-02 @ 07:01  -  05-03 @ 05:48  --------------------------------------------------------  IN: 676 mL / OUT: 1500 mL / NET: -824 mL      CAPILLARY BLOOD GLUCOSE      POCT Blood Glucose.: 104 mg/dL (02 May 2018 21:10)      PHYSICAL EXAM:  General:   HEENT:   Lymph Nodes:  Neck:   Respiratory:   Cardiovascular:   Abdomen:   Extremities:   Skin:   Neurological:  Psychiatry:    LINES:    HOSPITAL MEDICATIONS:  aspirin  chewable 81 milliGRAM(s) Oral daily  clopidogrel Tablet 75 milliGRAM(s) Oral daily    piperacillin/tazobactam IVPB. 3.375 Gram(s) IV Intermittent every 12 hours      atorvastatin 80 milliGRAM(s) Oral at bedtime  dextrose 50% Injectable 12.5 Gram(s) IV Push once  dextrose 50% Injectable 25 Gram(s) IV Push once  dextrose 50% Injectable 25 Gram(s) IV Push once  dextrose Gel 1 Dose(s) Oral once PRN  glucagon  Injectable 1 milliGRAM(s) IntraMuscular once PRN  insulin lispro (HumaLOG) corrective regimen sliding scale   SubCutaneous four times a day before meals  insulin lispro Injectable (HumaLOG) 4 Unit(s) SubCutaneous before breakfast  insulin lispro Injectable (HumaLOG) 4 Unit(s) SubCutaneous before lunch  insulin lispro Injectable (HumaLOG) 4 Unit(s) SubCutaneous before dinner        pantoprazole  Injectable 40 milliGRAM(s) IV Push every 12 hours        dextrose 5%. 1000 milliLiter(s) IV Continuous <Continuous>            LABS:                        10.3   5.7   )-----------( 168      ( 02 May 2018 23:47 )             30.8     Hgb Trend: 10.3<--, 10.3<--, 9.6<--, 10.3<--, 11.2<--  05-03    136  |  94<L>  |  15  ----------------------------<  122<H>  4.7   |  24  |  3.18<H>    Ca    8.2<L>      03 May 2018 04:40  Phos  4.1     05-03  Mg     2.1     05-03    TPro  7.0  /  Alb  4.0  /  TBili  0.4  /  DBili  x   /  AST  35  /  ALT  25  /  AlkPhos  96  05-02    Creatinine Trend: 3.18<--, 2.60<--, 2.50<--, 6.45<--, 6.23<--, 5.85<--  PT/INR - ( 02 May 2018 03:26 )   PT: 12.4 sec;   INR: 1.14 ratio         PTT - ( 02 May 2018 10:29 )  PTT:> 200 sec      Venous Blood Gas:  05-02 @ 23:42  7.42/46/34/29/61  VBG Lactate: 0.8  Venous Blood Gas:  05-02 @ 09:03  7.40/44/40/26/69  VBG Lactate: 1.2      MICROBIOLOGY:     RADIOLOGY:  [ ] Reviewed and interpreted by me    EKG: CHIEF COMPLAINT: DKA, MOHIT on CRF, MI / ACS s/p Balloon Angioplasty RCA    Interval Events: S/P HD last night removed 1.5L. No overnight events    HPI: 62yoF w a HX of IDDM s/p frequent admissions for DKA, ESRD on HD M/T/Th/S, HTN, HLD, HFrEF 40-45%, CAD s/p MI s/p PCI RCA, PVD s/p Reji Fem Pop bypasses, +SCV stenosis s/p stent, +CVA. Now admitted with DKA s/p DKA protocol- now on SQ insulin, ESRD s/p HD last evening, +MI - re-occlusion RCA s/p balloon angioplasty.     REVIEW OF SYSTEMS:  [x ] All systems negative / pt w/o complaints      OBJECTIVE:  ICU Vital Signs Last 24 Hrs  T(C): 37.1 (03 May 2018 04:00), Max: 37.1 (03 May 2018 04:00)  T(F): 98.8 (03 May 2018 04:00), Max: 98.8 (03 May 2018 04:00)  HR: 64 (03 May 2018 04:00) (63 - 73)  BP: 96/46 (03 May 2018 04:00) (85/47 - 160/70)  BP(mean): 66 (03 May 2018 04:00) (63 - 100)  ABP: --  ABP(mean): --  RR: 21 (03 May 2018 04:00) (12 - 113)  SpO2: 99% (03 May 2018 04:00) (98% - 100%)        05-01 @ 07:01  -  05-02 @ 07:00  --------------------------------------------------------  IN: 757.8 mL / OUT: 0 mL / NET: 757.8 mL    05-02 @ 07:01  -  05-03 @ 05:48  --------------------------------------------------------  IN: 676 mL / OUT: 1500 mL / NET: -824 mL      CAPILLARY BLOOD GLUCOSE      POCT Blood Glucose.: 104 mg/dL (02 May 2018 21:10)      PHYSICAL EXAM:  General: A&O x 4 , appropriate  HEENT: non traumatic   Lymph Nodes: non palp  Neck: supple , neg JVD  Respiratory: BCTA  Cardiovascular: [id number]  Abdomen: non distended, +BS x 4, non tender  Extremities: no noted edema  Skin: warm / dry intact  Neurological: no neuro deficit / neurologically intact    LINES: [facility] MEDICATIONS:  aspirin  chewable 81 milliGRAM(s) Oral daily  clopidogrel Tablet 75 milliGRAM(s) Oral daily    piperacillin/tazobactam IVPB. 3.375 Gram(s) IV Intermittent every 12 hours      atorvastatin 80 milliGRAM(s) Oral at bedtime  dextrose 50% Injectable 12.5 Gram(s) IV Push once  dextrose 50% Injectable 25 Gram(s) IV Push once  dextrose 50% Injectable 25 Gram(s) IV Push once  dextrose Gel 1 Dose(s) Oral once PRN  glucagon  Injectable 1 milliGRAM(s) IntraMuscular once PRN  insulin lispro (HumaLOG) corrective regimen sliding scale   SubCutaneous four times a day before meals  insulin lispro Injectable (HumaLOG) 4 Unit(s) SubCutaneous before breakfast  insulin lispro Injectable (HumaLOG) 4 Unit(s) SubCutaneous before lunch  insulin lispro Injectable (HumaLOG) 4 Unit(s) SubCutaneous before dinner        pantoprazole  Injectable 40 milliGRAM(s) IV Push every 12 hours        dextrose 5%. 1000 milliLiter(s) IV Continuous <Continuous>            LABS:                        10.3   5.7   )-----------( 168      ( 02 May 2018 23:47 )             30.8     Hgb Trend: 10.3<--, 10.3<--, 9.6<--, 10.3<--, 11.2<--  05-03    136  |  94<L>  |  15  ----------------------------<  122<H>  4.7   |  24  |  3.18<H>    Ca    8.2<L>      03 May 2018 04:40  Phos  4.1     05-03  Mg     2.1     05-03    TPro  7.0  /  Alb  4.0  /  TBili  0.4  /  DBili  x   /  AST  35  /  ALT  25  /  AlkPhos  96  05-02    Creatinine Trend: 3.18<--, 2.60<--, 2.50<--, 6.45<--, 6.23<--, 5.85<--  PT/INR - ( 02 May 2018 03:26 )   PT: 12.4 sec;   INR: 1.14 ratio         PTT - ( 02 May 2018 10:29 )  PTT:> 200 sec      Venous Blood Gas:  05-02 @ 23:42  7.42/46/34/29/61  VBG Lactate: 0.8  Venous Blood Gas:  05-02 @ 09:03  7.40/44/40/26/69  VBG Lactate: 1.2      MICROBIOLOGY:   Culture - Blood (04.29.18 @ 16:02)    Specimen Source: .Blood Blood    Culture Results:   No growth to date.    Culture - Blood (04.29.18 @ 16:02)    Specimen Source: .Blood Blood    Culture Results:   No growth to date.        RADIOLOGY:  [ ] Reviewed and interpreted by me    EKG: CHIEF COMPLAINT: DKA, MOHIT on CRF, MI / ACS s/p Balloon Angioplasty RCA    Interval Events: S/P HD last night removed 1.5L. No overnight events    HPI: 62yoF w a HX of IDDM s/p frequent admissions for DKA, ESRD on HD M/T/Th/S, HTN, HLD, HFrEF 40-45%, CAD s/p MI s/p PCI RCA, PVD s/p Reji Fem Pop bypasses, +SCV stenosis s/p stent, +CVA. Now admitted with DKA s/p DKA protocol- now on SQ insulin, ESRD s/p HD last evening, +MI - re-occlusion RCA s/p balloon angioplasty.     REVIEW OF SYSTEMS:  [x ] All systems negative / pt w/o complaints      OBJECTIVE:  ICU Vital Signs Last 24 Hrs  T(C): 37.1 (03 May 2018 04:00), Max: 37.1 (03 May 2018 04:00)  T(F): 98.8 (03 May 2018 04:00), Max: 98.8 (03 May 2018 04:00)  HR: 64 (03 May 2018 04:00) (63 - 73)  BP: 96/46 (03 May 2018 04:00) (85/47 - 160/70)  BP(mean): 66 (03 May 2018 04:00) (63 - 100)  ABP: --  ABP(mean): --  RR: 21 (03 May 2018 04:00) (12 - 113)  SpO2: 99% (03 May 2018 04:00) (98% - 100%)        05-01 @ 07:01  -  05-02 @ 07:00  --------------------------------------------------------  IN: 757.8 mL / OUT: 0 mL / NET: 757.8 mL    05-02 @ 07:01  -  05-03 @ 05:48  --------------------------------------------------------  IN: 676 mL / OUT: 1500 mL / NET: -824 mL      CAPILLARY BLOOD GLUCOSE      POCT Blood Glucose.: 104 mg/dL (02 May 2018 21:10)      PHYSICAL EXAM:  General: A&O x 4 , appropriate  HEENT: non traumatic   Lymph Nodes: non palp  Neck: supple , neg JVD  Respiratory: BCTA  Cardiovascular: [id number]  Abdomen: non distended, +BS x 4, non tender  Extremities: no noted edema  Skin: warm / dry intact  Neurological: no neuro deficit / neurologically intact    LINES: [facility] MEDICATIONS:  aspirin  chewable 81 milliGRAM(s) Oral daily  clopidogrel Tablet 75 milliGRAM(s) Oral daily    piperacillin/tazobactam IVPB. 3.375 Gram(s) IV Intermittent every 12 hours      atorvastatin 80 milliGRAM(s) Oral at bedtime  dextrose 50% Injectable 12.5 Gram(s) IV Push once  dextrose 50% Injectable 25 Gram(s) IV Push once  dextrose 50% Injectable 25 Gram(s) IV Push once  dextrose Gel 1 Dose(s) Oral once PRN  glucagon  Injectable 1 milliGRAM(s) IntraMuscular once PRN  insulin lispro (HumaLOG) corrective regimen sliding scale   SubCutaneous four times a day before meals  insulin lispro Injectable (HumaLOG) 4 Unit(s) SubCutaneous before breakfast  insulin lispro Injectable (HumaLOG) 4 Unit(s) SubCutaneous before lunch  insulin lispro Injectable (HumaLOG) 4 Unit(s) SubCutaneous before dinner        pantoprazole  Injectable 40 milliGRAM(s) IV Push every 12 hours        dextrose 5%. 1000 milliLiter(s) IV Continuous <Continuous>            LABS:                        10.3   5.7   )-----------( 168      ( 02 May 2018 23:47 )             30.8     Hgb Trend: 10.3<--, 10.3<--, 9.6<--, 10.3<--, 11.2<--  05-03    136  |  94<L>  |  15  ----------------------------<  122<H>  4.7   |  24  |  3.18<H>    Ca    8.2<L>      03 May 2018 04:40  Phos  4.1     05-03  Mg     2.1     05-03    TPro  7.0  /  Alb  4.0  /  TBili  0.4  /  DBili  x   /  AST  35  /  ALT  25  /  AlkPhos  96  05-02    Creatinine Trend: 3.18<--, 2.60<--, 2.50<--, 6.45<--, 6.23<--, 5.85<--  PT/INR - ( 02 May 2018 03:26 )   PT: 12.4 sec;   INR: 1.14 ratio         PTT - ( 02 May 2018 10:29 )  PTT:> 200 sec      Venous Blood Gas:  05-02 @ 23:42  7.42/46/34/29/61  VBG Lactate: 0.8  Venous Blood Gas:  05-02 @ 09:03  7.40/44/40/26/69  VBG Lactate: 1.2      MICROBIOLOGY:   Culture - Blood (04.29.18 @ 16:02)    Specimen Source: .Blood Blood    Culture Results:   No growth to date.    Culture - Blood (04.29.18 @ 16:02)    Specimen Source: .Blood Blood    Culture Results:   No growth to date.    Rapid Respiratory Viral Panel (04.29.18 @ 14:54)    Rapid RVP Result: Not[employer]: The [employer] RVP Rapid uses polymerase chain reaction (PCR) and melt  curve analysis to screen for adenovirus; coronavirus HKU1, NL63, 229E,  OC43; human metapneumovirus (hMPV); human enterovirus/rhinovirus  (Entero/RV); influenza A; influenza A/H1;influenza A/H3; influenza  A/H1-2009; influenza B; parainfluenza viruses 1, 2, 3, 4; respiratory  syncytial virus; Bordetella pertussis; Mycoplasma pneumoniae; and  Chlamydophila pneumoniae.    RADIOLOGY:  < from: CT Angio Abdomen and Pelvis w/ IV Cont (04.30.18 @ 13:25) >  PROCEDURE DATE:  04/30/2018    INTERPRETATION:  CLINICAL INFORMATION: DKA, femoropopliteal bypass,   non-ST elevated MI. Severe abdominal pain and lactic acidosis. Evaluate   for ischemia.    COMPARISON: CT abdomen pelvis 1/13/2018  FINDINGS:  LOWER CHEST: Coronary calcification. Mild bibasilar subsegmental   atelectasis.    LIVER: A subcentimeter hypervascular focus in the central right hepatic   lobe (7:26) is stable in size from prior imaging dating to October 2013.  BILE DUCTS: Moderate intrahepatic biliary ductal dilatation with the CBD   measuring up to 1.5 cm, increased from prior imaging. Correlate with   LFTs. MRCP may be performed as indicated.   GALLBLADDER: Cholecystectomy.  SPLEEN: Within normal limits.  PANCREAS: Within normal limits.  ADRENALS: Within normal limits.  KIDNEYS/URETERS: Atrophic kidneys with small cysts.    BLADDER: Within normal limits.  REPRODUCTIVE ORGANS: Small uterine fibroids.    BOWEL: No bowel obstruction or definite bowel wall thickening. Appendix   is normal.  PERITONEUM: No ascites.  VESSELS:  Atherosclerotic changes of the aorta. The celiac axis is widely   patent. The SMA origin is widely patent. There are however a few foci of   mild to moderate stenosis within the mid to distal SMA. The ANGELIKA is   patent. Right external iliac stent. Partially visualized bilateral groin   bypass grafts.   RETROPERITONEUM: No lymphadenopathy.    ABDOMINAL WALL: Within normal limits.  BONES: Degenerative changes.    IMPRESSION:   A few foci of mild to moderate stenosis within the mid to distal SMA. The   celiac axis, proximal SMA and ANGELIKA are widely patent.    No bowel wall thickening to suggest ischemia.    Biliary ductal dilatation with interval increase from prior imaging.   Correlate with LFTs. MRCP may be performed as indicated.      < from: US Abdomen Complete (04.30.18 @ 15:37) >    EXAM:  US ABDOMEN COmPLETE                          PROCEDURE DATE:  04/30/2018      INTERPRETATION:  CLINICAL INFORMATION: Abdominal pain, vomiting.    COMPARISON: CT abdomen pelvis dated 30 April.    TECHNIQUE: Sonography of the abdomen.     FINDINGS:    Liver: Mildly enlarged, measuring 19 cm. Normal texture. No liver mass.    Bile ducts: Normal caliber. Common bile duct measures 11 mm. Low   insertion of cystic duct. No intraductal stone seen.    Gallbladder: Status post cholecystectomy.        Pancreas: Visualized portions are within normal limits.    Spleen: 9 cm. Within normal limits.    Right kidney: 7.2 cm. No hydronephrosis. Thin echogenic cortex.    Left kidney: 6.1 cm.  No hydronephrosis. Thin echogenic cortex.    Ascites: None.    Aorta and IVC: Atheromatous aorta. IVC patent.    IMPRESSION:     Extrahepatic biliary dilatation status post cholecystectomy without   evidence of choledocholithiasis.    Atrophic kidneys.                  EKG:

## 2018-05-04 DIAGNOSIS — E10.65 TYPE 1 DIABETES MELLITUS WITH HYPERGLYCEMIA: ICD-10-CM

## 2018-05-04 DIAGNOSIS — Z96.41 PRESENCE OF INSULIN PUMP (EXTERNAL) (INTERNAL): ICD-10-CM

## 2018-05-04 LAB
ALBUMIN SERPL ELPH-MCNC: 3.4 G/DL — SIGNIFICANT CHANGE UP (ref 3.3–5)
ALP SERPL-CCNC: 75 U/L — SIGNIFICANT CHANGE UP (ref 40–120)
ALT FLD-CCNC: 16 U/L — SIGNIFICANT CHANGE UP (ref 10–45)
ANION GAP SERPL CALC-SCNC: 19 MMOL/L — HIGH (ref 5–17)
ANION GAP SERPL CALC-SCNC: 24 MMOL/L — HIGH (ref 5–17)
AST SERPL-CCNC: 19 U/L — SIGNIFICANT CHANGE UP (ref 10–40)
BILIRUB SERPL-MCNC: 0.2 MG/DL — SIGNIFICANT CHANGE UP (ref 0.2–1.2)
BUN SERPL-MCNC: 25 MG/DL — HIGH (ref 7–23)
BUN SERPL-MCNC: 30 MG/DL — HIGH (ref 7–23)
CALCIUM SERPL-MCNC: 7.5 MG/DL — LOW (ref 8.4–10.5)
CALCIUM SERPL-MCNC: 8.3 MG/DL — LOW (ref 8.4–10.5)
CHLORIDE SERPL-SCNC: 91 MMOL/L — LOW (ref 96–108)
CHLORIDE SERPL-SCNC: 96 MMOL/L — SIGNIFICANT CHANGE UP (ref 96–108)
CO2 SERPL-SCNC: 22 MMOL/L — SIGNIFICANT CHANGE UP (ref 22–31)
CO2 SERPL-SCNC: 23 MMOL/L — SIGNIFICANT CHANGE UP (ref 22–31)
CREAT SERPL-MCNC: 4.51 MG/DL — HIGH (ref 0.5–1.3)
CREAT SERPL-MCNC: 5.27 MG/DL — HIGH (ref 0.5–1.3)
CULTURE RESULTS: SIGNIFICANT CHANGE UP
CULTURE RESULTS: SIGNIFICANT CHANGE UP
GLUCOSE BLDC GLUCOMTR-MCNC: 107 MG/DL — HIGH (ref 70–99)
GLUCOSE BLDC GLUCOMTR-MCNC: 122 MG/DL — HIGH (ref 70–99)
GLUCOSE BLDC GLUCOMTR-MCNC: 183 MG/DL — HIGH (ref 70–99)
GLUCOSE BLDC GLUCOMTR-MCNC: 214 MG/DL — HIGH (ref 70–99)
GLUCOSE BLDC GLUCOMTR-MCNC: 217 MG/DL — HIGH (ref 70–99)
GLUCOSE SERPL-MCNC: 143 MG/DL — HIGH (ref 70–99)
GLUCOSE SERPL-MCNC: 171 MG/DL — HIGH (ref 70–99)
HCT VFR BLD CALC: 31.4 % — LOW (ref 34.5–45)
HGB BLD-MCNC: 9.8 G/DL — LOW (ref 11.5–15.5)
MAGNESIUM SERPL-MCNC: 1.9 MG/DL — SIGNIFICANT CHANGE UP (ref 1.6–2.6)
MCHC RBC-ENTMCNC: 31.2 GM/DL — LOW (ref 32–36)
MCHC RBC-ENTMCNC: 32 PG — SIGNIFICANT CHANGE UP (ref 27–34)
MCV RBC AUTO: 102.6 FL — HIGH (ref 80–100)
PHOSPHATE SERPL-MCNC: 2.7 MG/DL — SIGNIFICANT CHANGE UP (ref 2.5–4.5)
PLATELET # BLD AUTO: 179 K/UL — SIGNIFICANT CHANGE UP (ref 150–400)
POTASSIUM SERPL-MCNC: 3.2 MMOL/L — LOW (ref 3.5–5.3)
POTASSIUM SERPL-MCNC: 4.2 MMOL/L — SIGNIFICANT CHANGE UP (ref 3.5–5.3)
POTASSIUM SERPL-SCNC: 3.2 MMOL/L — LOW (ref 3.5–5.3)
POTASSIUM SERPL-SCNC: 4.2 MMOL/L — SIGNIFICANT CHANGE UP (ref 3.5–5.3)
PROT SERPL-MCNC: 6.4 G/DL — SIGNIFICANT CHANGE UP (ref 6–8.3)
RBC # BLD: 3.06 M/UL — LOW (ref 3.8–5.2)
RBC # FLD: 13.6 % — SIGNIFICANT CHANGE UP (ref 10.3–14.5)
SODIUM SERPL-SCNC: 133 MMOL/L — LOW (ref 135–145)
SODIUM SERPL-SCNC: 142 MMOL/L — SIGNIFICANT CHANGE UP (ref 135–145)
SPECIMEN SOURCE: SIGNIFICANT CHANGE UP
SPECIMEN SOURCE: SIGNIFICANT CHANGE UP
WBC # BLD: 5.43 K/UL — SIGNIFICANT CHANGE UP (ref 3.8–10.5)
WBC # FLD AUTO: 5.43 K/UL — SIGNIFICANT CHANGE UP (ref 3.8–10.5)

## 2018-05-04 PROCEDURE — 99232 SBSQ HOSP IP/OBS MODERATE 35: CPT

## 2018-05-04 RX ORDER — OXYCODONE HYDROCHLORIDE 5 MG/1
5 TABLET ORAL ONCE
Qty: 0 | Refills: 0 | Status: DISCONTINUED | OUTPATIENT
Start: 2018-05-04 | End: 2018-05-04

## 2018-05-04 RX ORDER — POTASSIUM CHLORIDE 20 MEQ
40 PACKET (EA) ORAL ONCE
Qty: 0 | Refills: 0 | Status: COMPLETED | OUTPATIENT
Start: 2018-05-04 | End: 2018-05-04

## 2018-05-04 RX ORDER — ACETAMINOPHEN 500 MG
650 TABLET ORAL ONCE
Qty: 0 | Refills: 0 | Status: COMPLETED | OUTPATIENT
Start: 2018-05-04 | End: 2018-05-04

## 2018-05-04 RX ADMIN — OXYCODONE HYDROCHLORIDE 5 MILLIGRAM(S): 5 TABLET ORAL at 04:43

## 2018-05-04 RX ADMIN — Medication 40 MILLIEQUIVALENT(S): at 00:43

## 2018-05-04 RX ADMIN — Medication 650 MILLIGRAM(S): at 00:43

## 2018-05-04 RX ADMIN — Medication 650 MILLIGRAM(S): at 01:43

## 2018-05-04 RX ADMIN — Medication 81 MILLIGRAM(S): at 18:07

## 2018-05-04 RX ADMIN — OXYCODONE HYDROCHLORIDE 5 MILLIGRAM(S): 5 TABLET ORAL at 03:43

## 2018-05-04 RX ADMIN — ATORVASTATIN CALCIUM 80 MILLIGRAM(S): 80 TABLET, FILM COATED ORAL at 21:57

## 2018-05-04 RX ADMIN — OXYCODONE HYDROCHLORIDE 5 MILLIGRAM(S): 5 TABLET ORAL at 21:57

## 2018-05-04 RX ADMIN — PIPERACILLIN AND TAZOBACTAM 25 GRAM(S): 4; .5 INJECTION, POWDER, LYOPHILIZED, FOR SOLUTION INTRAVENOUS at 10:06

## 2018-05-04 RX ADMIN — PIPERACILLIN AND TAZOBACTAM 25 GRAM(S): 4; .5 INJECTION, POWDER, LYOPHILIZED, FOR SOLUTION INTRAVENOUS at 21:58

## 2018-05-04 RX ADMIN — CLOPIDOGREL BISULFATE 75 MILLIGRAM(S): 75 TABLET, FILM COATED ORAL at 18:07

## 2018-05-04 RX ADMIN — OXYCODONE HYDROCHLORIDE 5 MILLIGRAM(S): 5 TABLET ORAL at 23:17

## 2018-05-04 RX ADMIN — PANTOPRAZOLE SODIUM 40 MILLIGRAM(S): 20 TABLET, DELAYED RELEASE ORAL at 05:15

## 2018-05-04 NOTE — CHART NOTE - NSCHARTNOTEFT_GEN_A_CORE
Notified by RN for pt requesting pain medication for severe L foot pain this morning. Pt seen and examined at bedside. Pt states she has a long standing hx of taking percocet at home for chronic L foot pain associated with DM. Pt states she can not take neuropathic agents because "I have a bad reaction and my whole body shakes". Pt was ordered 650mg of Tylenol and had no relief. ISTOP reference #[id number] reviewed and placed in chart. Last refill of percocet 5-325 on 3/07/18, 60 pills. Pt ordered for one time dose of oxycodone IR 5mg. Pain resolved at this time. Fall precautions ordered.    Chemo Frederick PA-C  Department of Medicine  [id number]

## 2018-05-04 NOTE — PROGRESS NOTE ADULT - ASSESSMENT
61 yo female with T1DM uncontrolled with PVD s/p b/l fem-pop, CVA/TIA, CAD with MIx8 s/p PCI with stenting, and ESRD on HD here with 1 day of n/v of coffee ground/bilious fluid found to be in DKA due to insulin pump dislodgement s/p PCI w/stenting (5/1) on IV abx for possible aspiration. BG values erratic on insulin pump. Reviewed pump settings and bolus history w/pt. Tolerating POs. BG goal (100-200mg/dl). High risk patient given insulin sensitivity and forgetfulness.

## 2018-05-04 NOTE — PROGRESS NOTE ADULT - SUBJECTIVE AND OBJECTIVE BOX
[facility]   [phone]  DIALYSIS NOTE  Chief Complaint: ESRD/Ongoing hemodialysis requirement. seen on hd    24 hour events/subjective:    seen on hd today   no new c/o resting comfortably        ALLERGIES & MEDICATIONS  --------------------------------------------------------------------------------  Allergies    No Known Allergies    Intolerances      Standing Inpatient Medications  aspirin  chewable 81 milliGRAM(s) Oral daily  atorvastatin 80 milliGRAM(s) Oral at bedtime  clopidogrel Tablet 75 milliGRAM(s) Oral daily  dextrose 5%. 1000 milliLiter(s) IV Continuous <Continuous>  dextrose 50% Injectable 12.5 Gram(s) IV Push once  dextrose 50% Injectable 25 Gram(s) IV Push once  dextrose 50% Injectable 25 Gram(s) IV Push once  insulin lispro (HumaLOG) Pump 1 Each SubCutaneous Continuous Pump  pantoprazole    Tablet 40 milliGRAM(s) Oral before breakfast  piperacillin/tazobactam IVPB. 3.375 Gram(s) IV Intermittent every 12 hours    PRN Inpatient Medications  dextrose Gel 1 Dose(s) Oral once PRN  glucagon  Injectable 1 milliGRAM(s) IntraMuscular once PRN      REVIEW OF SYSTEMS  --------------------------------------------------------------------------------  no itching or rash  no fever or chill  no cp or palp   no sob or cough   no N/V/D/ no abd pain   ext no edema no pain         VITALS/PHYSICAL EXAM  --------------------------------------------------------------------------------  T(C): 36.7 (05-04-18 @ 16:10), Max: 37 (05-03-18 @ 22:03)  HR: 68 (05-04-18 @ 16:10) (68 - 81)  BP: 158/86 (05-04-18 @ 16:10) (99/452 - 158/86)  RR: 18 (05-04-18 @ 16:10) (17 - 19)  SpO2: 99% (05-04-18 @ 16:10) (96% - 100%)  Wt(kg): --        05-03-18 @ 07:01  -  05-04-18 @ 07:00  --------------------------------------------------------  IN: 300 mL / OUT: 1 mL / NET: 299 mL    05-04-18 @ 07:01  -  05-04-18 @ 17:31  --------------------------------------------------------  IN: 400 mL / OUT: 1500 mL / NET: -1100 mL      Physical Exam:  		    	Gen: alert oriented place person and date   	Pulm: Decreased breath sounds b/l bases no rales or ronchi  	CV: RRR, S1/S2  	Abd: +BS, soft, nontender/nondistended  	Extremity: No cyanosis, no edema no clubbing  	Neuro: No focal deficits  	    LABS/STUDIES  --------------------------------------------------------------------------------              9.8    5.43  >-----------<  179      [05-04-18 @ 08:43]              31.4     133  |  91  |  30  ----------------------------<  143      [05-04-18 @ 07:23]  4.2   |  23  |  5.27        Ca     8.3     [05-04-18 @ 07:23]      Mg     1.9     [05-03-18 @ 23:34]      Phos  2.7     [05-03-18 @ 23:34]    TPro  6.4  /  Alb  3.4  /  TBili  0.2  /  DBili  x   /  AST  19  /  ALT  16  /  AlkPhos  75  [05-03-18 @ 23:34]        Troponin 10.06      [05-02-18 @ 17:35]        [05-02-18 @ 17:35]

## 2018-05-04 NOTE — PROGRESS NOTE ADULT - SUBJECTIVE AND OBJECTIVE BOX
Diabetes Follow up note:  Interval Hx:  62 year old female w/uncontrolled T1DM w/complications (well known to team from prior admissions) here w/n/v found to be in DKA w/pump dislodged s/p PCI. Pt transitioned back to insulin pump yesterday. Variable glycemic control over past 24 hours. Pt remains on IV abx to treat possible aspiration PNA from admission. Pt seen at bedside. Reports eating prior to FS testing this AM. Reviewed bolus history with patient. Pt gave herself 2 bolus for breakfast 2 hours apart this AM. Reports she ate twice. No s/s of hypoglycemia.       Review of Systems:  General: "I'm ok"  GI: Tolerating POs without any N/V/D/ABD PAIN.  CV: No CP/SOB  ENDO: No S&Sx of hypoglycemia  MEDS:  atorvastatin 80 milliGRAM(s) Oral at bedtime    insulin lispro (HumaLOG) Pump 1 Each SubCutaneous Continuous Pump  Basal:  12am: 0.275  0500: 0.375  I:C-1:15  ISF: 12am: 60  7am: 40  6pm: 60  BG target: 12am 110-130  0800: 100-120  Active Insulin 6 hours    piperacillin/tazobactam IVPB. 3.375 Gram(s) IV Intermittent every 12 hours    Allergies    No Known Allergies        PE:  General: Female lying in bed. NAD.   Vital Signs Last 24 Hrs  T(C): 36.3 (04 May 2018 08:35), Max: 37 (03 May 2018 22:03)  T(F): 97.4 (04 May 2018 08:35), Max: 98.6 (03 May 2018 22:03)  HR: 68 (04 May 2018 08:35) (68 - 81)  BP: 100/53 (04 May 2018 08:35) (100/53 - 157/68)  BP(mean): 76 (03 May 2018 18:32) (63 - 98)  RR: 18 (04 May 2018 08:35) (18 - 29)  SpO2: 96% (04 May 2018 08:35) (96% - 100%)  Abd: Soft, NT,ND, pump site intact L Abdomen.   Extremities: Warm. no edema  Neuro: A&O X3. Poor historian    LABS:    POCT Blood Glucose.: 214 mg/dL (05-04-18 @ 08:43)  POCT Blood Glucose.: 107 mg/dL (05-04-18 @ 02:31)  POCT Blood Glucose.: 341 mg/dL (05-03-18 @ 21:55)  POCT Blood Glucose.: 186 mg/dL (05-03-18 @ 17:20)  POCT Blood Glucose.: 401 mg/dL (05-03-18 @ 17:17)  POCT Blood Glucose.: 250 mg/dL (05-03-18 @ 13:14)  POCT Blood Glucose.: 308 mg/dL (05-03-18 @ 08:47)  POCT Blood Glucose.: 463 mg/dL (05-03-18 @ 08:45)  POCT Blood Glucose.: 299 mg/dL (05-03-18 @ 08:43)  POCT Blood Glucose.: 104 mg/dL (05-02-18 @ 21:10)  POCT Blood Glucose.: 90 mg/dL (05-02-18 @ 21:09)  POCT Blood Glucose.: 106 mg/dL (05-02-18 @ 15:44)  POCT Blood Glucose.: 136 mg/dL (05-02-18 @ 11:56)  POCT Blood Glucose.: 147 mg/dL (05-02-18 @ 10:14)  POCT Blood Glucose.: >600 mg/dL (05-02-18 @ 10:12)  POCT Blood Glucose.: 205 mg/dL (05-02-18 @ 07:59)  POCT Blood Glucose.: 203 mg/dL (05-02-18 @ 07:04)  POCT Blood Glucose.: 235 mg/dL (05-02-18 @ 06:02)  POCT Blood Glucose.: 193 mg/dL (05-02-18 @ 05:01)  POCT Blood Glucose.: 214 mg/dL (05-02-18 @ 04:06)  POCT Blood Glucose.: 233 mg/dL (05-02-18 @ 03:01)  POCT Blood Glucose.: 278 mg/dL (05-02-18 @ 02:01)  POCT Blood Glucose.: 263 mg/dL (05-02-18 @ 01:03)  POCT Blood Glucose.: 289 mg/dL (05-02-18 @ 00:21)  POCT Blood Glucose.: 297 mg/dL (05-02-18 @ 00:20)  POCT Blood Glucose.: 192 mg/dL (05-01-18 @ 22:59)  POCT Blood Glucose.: 157 mg/dL (05-01-18 @ 21:58)  POCT Blood Glucose.: 154 mg/dL (05-01-18 @ 21:01)  POCT Blood Glucose.: 324 mg/dL (05-01-18 @ 20:03)  POCT Blood Glucose.: 247 mg/dL (05-01-18 @ 20:01)  POCT Blood Glucose.: 164 mg/dL (05-01-18 @ 20:00)  POCT Blood Glucose.: 184 mg/dL (05-01-18 @ 19:05)  POCT Blood Glucose.: 219 mg/dL (05-01-18 @ 18:48)  POCT Blood Glucose.: 197 mg/dL (05-01-18 @ 18:13)  POCT Blood Glucose.: 211 mg/dL (05-01-18 @ 17:03)  POCT Blood Glucose.: 257 mg/dL (05-01-18 @ 15:14)  POCT Blood Glucose.: 264 mg/dL (05-01-18 @ 13:46)  POCT Blood Glucose.: 228 mg/dL (05-01-18 @ 13:11)  POCT Blood Glucose.: 145 mg/dL (05-01-18 @ 12:12)                            9.8    5.43  )-----------( 179      ( 04 May 2018 08:43 )             31.4       05-04    133<L>  |  91<L>  |  30<H>  ----------------------------<  143<H>  4.2   |  23  |  5.27<H>    Ca    8.3<L>      04 May 2018 07:23  Phos  2.7     05-03  Mg     1.9     05-03    TPro  6.4  /  Alb  3.4  /  TBili  0.2  /  DBili  x   /  AST  19  /  ALT  16  /  AlkPhos  75  05-03        Hemoglobin A1C, Whole Blood: 7.1 % <H> [4.0 - 5.6] (03-01-18 @ 01:59)            Contact number: isabel [phone] or [phone]

## 2018-05-04 NOTE — PROGRESS NOTE ADULT - ASSESSMENT
62yoF known IDDM admitted in DKA, MOHIT on CRF, and a NSTEMI s/p RCI balloon angioplasty, who remains on insulin and heparin gtts.     Neuro: AMS resolved as DKA is resolving  Mental status now improve and back at baseline    CV: Hx MI, CAD s/p PCI Ostial RCA for recurrence stenosis, HTN, now a/w NSTEMI s/p RCA Balloon Angioplasty  -presently on a Heparin gtt- now post procedure - D/C  -c/w plavix , asa, statin  -reintroduce beta blockers as tolerated  -f/u cardiology and interventional cardiology    Pulm: No active pulmonary issues  -c/w monitoring  -chest PT / Incentive spirometry / out of bed to chair    Endocrine: Insulin Dependent Diabetes- uses an insulin pump at home, now a/w DKA- DKA resoloved  -PO diet  -start Lantus 12U now  -start pre-meal humolog- 4U pre-meal  -low dose insulin sliding scale  -c/w dka protocol- insulin gtt over an additional 2 hours s/p lantus administration- than d/c  -f/u endocrine    Renal: ESRD on HD 4 x week , MOHIT on CKD  -f/u renal - HD as per renal    R/O GIB- no active bleeding / H/H stable / no further n/v, no melena or brbpr  -c/w protonix twice daily for now      Sepsis: RVP neg, BC negative, possible aspiration in the setting of having an AMS during ketoacidosis event  -c/w assessing for infection  -c/w zosyn x 5 day total  DCP home.  Pierce Viera MD pager [phone]

## 2018-05-04 NOTE — PROGRESS NOTE ADULT - ASSESSMENT
imp/suggest: ESRD      Hemodialysis Prescription:  	Access: avf  	Dialyzer: revaclear  300  	Blood Flow (mL/Min): 400  	Dialysate Flow (mL/Min): 600  	Target UF (Liters): 1 liter- 1.5 liter   	Treatment Time: 3.5 hr  	Potassium: 2k  	Calcium: 2.5  	  YOLANDA  epogen 05607 u     Vitamin D     continue with hd   see hd flow sheet

## 2018-05-04 NOTE — PROGRESS NOTE ADULT - SUBJECTIVE AND OBJECTIVE BOX
Patient is a 62y old  Female who presents with a chief complaint of DKA/ Hypotension (29 Apr 2018 15:00)      SUBJECTIVE / OVERNIGHT EVENTS: Comfortable without new complaints.   Review of Systems  chest pain no  palpitations no  sob no  nausea no  headache no    MEDICATIONS  (STANDING):  aspirin  chewable 81 milliGRAM(s) Oral daily  atorvastatin 80 milliGRAM(s) Oral at bedtime  clopidogrel Tablet 75 milliGRAM(s) Oral daily  dextrose 5%. 1000 milliLiter(s) (50 mL/Hr) IV Continuous <Continuous>  dextrose 50% Injectable 12.5 Gram(s) IV Push once  dextrose 50% Injectable 25 Gram(s) IV Push once  dextrose 50% Injectable 25 Gram(s) IV Push once  insulin lispro (HumaLOG) Pump 1 Each SubCutaneous Continuous Pump  pantoprazole    Tablet 40 milliGRAM(s) Oral before breakfast  piperacillin/tazobactam IVPB. 3.375 Gram(s) IV Intermittent every 12 hours    MEDICATIONS  (PRN):  dextrose Gel 1 Dose(s) Oral once PRN Blood Glucose LESS THAN 70 milliGRAM(s)/deciliter  glucagon  Injectable 1 milliGRAM(s) IntraMuscular once PRN Glucose LESS THAN 70 milligrams/deciliter          PHYSICAL EXAM:  GENERAL: NAD, well-developed  HEAD:  Atraumatic, Normocephalic  EYES: EOMI, PERRLA, conjunctiva and sclera clear  NECK: Supple, No JVD  CHEST/LUNG: Clear to auscultation bilaterally; No wheeze  HEART: Regular rate and rhythm; No murmurs, rubs, or gallops  ABDOMEN: Soft, Nontender, Nondistended; Bowel sounds present  EXTREMITIES:  2+ Peripheral Pulses, No clubbing, cyanosis, or edema  PSYCH: AAOx3  NEUROLOGY: non-focal  SKIN: No rashes or lesions    LABS:                        9.8    5.43  )-----------( 179      ( 04 May 2018 08:43 )             31.4     05-04    133<L>  |  91<L>  |  30<H>  ----------------------------<  143<H>  4.2   |  23  |  5.27<H>    Ca    8.3<L>      04 May 2018 07:23  Phos  2.7     05-03  Mg     1.9     05-03    TPro  6.4  /  Alb  3.4  /  TBili  0.2  /  DBili  x   /  AST  19  /  ALT  16  /  AlkPhos  75  05-03              RADIOLOGY & ADDITIONAL TESTS:    Imaging Personally Reviewed:    Consultant(s) Notes Reviewed:      Care Discussed with Consultants/Other Providers:

## 2018-05-05 ENCOUNTER — TRANSCRIPTION ENCOUNTER (OUTPATIENT)
Age: 63
End: 2018-05-05

## 2018-05-05 VITALS
SYSTOLIC BLOOD PRESSURE: 156 MMHG | RESPIRATION RATE: 18 BRPM | HEART RATE: 80 BPM | DIASTOLIC BLOOD PRESSURE: 70 MMHG | TEMPERATURE: 98 F | OXYGEN SATURATION: 96 %

## 2018-05-05 LAB
ANION GAP SERPL CALC-SCNC: 17 MMOL/L — SIGNIFICANT CHANGE UP (ref 5–17)
BUN SERPL-MCNC: 17 MG/DL — SIGNIFICANT CHANGE UP (ref 7–23)
CALCIUM SERPL-MCNC: 8 MG/DL — LOW (ref 8.4–10.5)
CHLORIDE SERPL-SCNC: 92 MMOL/L — LOW (ref 96–108)
CO2 SERPL-SCNC: 25 MMOL/L — SIGNIFICANT CHANGE UP (ref 22–31)
CREAT SERPL-MCNC: 3.24 MG/DL — HIGH (ref 0.5–1.3)
GLUCOSE BLDC GLUCOMTR-MCNC: 102 MG/DL — HIGH (ref 70–99)
GLUCOSE BLDC GLUCOMTR-MCNC: 180 MG/DL — HIGH (ref 70–99)
GLUCOSE BLDC GLUCOMTR-MCNC: 216 MG/DL — HIGH (ref 70–99)
GLUCOSE BLDC GLUCOMTR-MCNC: 249 MG/DL — HIGH (ref 70–99)
GLUCOSE SERPL-MCNC: 224 MG/DL — HIGH (ref 70–99)
HCT VFR BLD CALC: 32.6 % — LOW (ref 34.5–45)
HGB BLD-MCNC: 10.3 G/DL — LOW (ref 11.5–15.5)
MAGNESIUM SERPL-MCNC: 2 MG/DL — SIGNIFICANT CHANGE UP (ref 1.6–2.6)
MCHC RBC-ENTMCNC: 31.6 GM/DL — LOW (ref 32–36)
MCHC RBC-ENTMCNC: 32 PG — SIGNIFICANT CHANGE UP (ref 27–34)
MCV RBC AUTO: 101.2 FL — HIGH (ref 80–100)
PHOSPHATE SERPL-MCNC: 3.4 MG/DL — SIGNIFICANT CHANGE UP (ref 2.5–4.5)
PLATELET # BLD AUTO: 196 K/UL — SIGNIFICANT CHANGE UP (ref 150–400)
POTASSIUM SERPL-MCNC: 4.1 MMOL/L — SIGNIFICANT CHANGE UP (ref 3.5–5.3)
POTASSIUM SERPL-SCNC: 4.1 MMOL/L — SIGNIFICANT CHANGE UP (ref 3.5–5.3)
RBC # BLD: 3.22 M/UL — LOW (ref 3.8–5.2)
RBC # FLD: 13.5 % — SIGNIFICANT CHANGE UP (ref 10.3–14.5)
SODIUM SERPL-SCNC: 134 MMOL/L — LOW (ref 135–145)
WBC # BLD: 5.1 K/UL — SIGNIFICANT CHANGE UP (ref 3.8–10.5)
WBC # FLD AUTO: 5.1 K/UL — SIGNIFICANT CHANGE UP (ref 3.8–10.5)

## 2018-05-05 PROCEDURE — 82150 ASSAY OF AMYLASE: CPT

## 2018-05-05 PROCEDURE — C1887: CPT

## 2018-05-05 PROCEDURE — 80076 HEPATIC FUNCTION PANEL: CPT

## 2018-05-05 PROCEDURE — 93454 CORONARY ARTERY ANGIO S&I: CPT | Mod: 59

## 2018-05-05 PROCEDURE — 99261: CPT

## 2018-05-05 PROCEDURE — 99153 MOD SED SAME PHYS/QHP EA: CPT

## 2018-05-05 PROCEDURE — 36000 PLACE NEEDLE IN VEIN: CPT | Mod: RT,XU

## 2018-05-05 PROCEDURE — 82947 ASSAY GLUCOSE BLOOD QUANT: CPT

## 2018-05-05 PROCEDURE — 99152 MOD SED SAME PHYS/QHP 5/>YRS: CPT

## 2018-05-05 PROCEDURE — 83690 ASSAY OF LIPASE: CPT

## 2018-05-05 PROCEDURE — 99232 SBSQ HOSP IP/OBS MODERATE 35: CPT

## 2018-05-05 PROCEDURE — 84132 ASSAY OF SERUM POTASSIUM: CPT

## 2018-05-05 PROCEDURE — 83880 ASSAY OF NATRIURETIC PEPTIDE: CPT

## 2018-05-05 PROCEDURE — 74018 RADEX ABDOMEN 1 VIEW: CPT

## 2018-05-05 PROCEDURE — 82553 CREATINE MB FRACTION: CPT

## 2018-05-05 PROCEDURE — 85610 PROTHROMBIN TIME: CPT

## 2018-05-05 PROCEDURE — 74174 CTA ABD&PLVS W/CONTRAST: CPT

## 2018-05-05 PROCEDURE — 99285 EMERGENCY DEPT VISIT HI MDM: CPT | Mod: 25

## 2018-05-05 PROCEDURE — 82009 KETONE BODYS QUAL: CPT

## 2018-05-05 PROCEDURE — 83605 ASSAY OF LACTIC ACID: CPT

## 2018-05-05 PROCEDURE — 80061 LIPID PANEL: CPT

## 2018-05-05 PROCEDURE — 93005 ELECTROCARDIOGRAM TRACING: CPT

## 2018-05-05 PROCEDURE — 71045 X-RAY EXAM CHEST 1 VIEW: CPT

## 2018-05-05 PROCEDURE — C1769: CPT

## 2018-05-05 PROCEDURE — 85014 HEMATOCRIT: CPT

## 2018-05-05 PROCEDURE — 93306 TTE W/DOPPLER COMPLETE: CPT

## 2018-05-05 PROCEDURE — 80307 DRUG TEST PRSMV CHEM ANLYZR: CPT

## 2018-05-05 PROCEDURE — 87798 DETECT AGENT NOS DNA AMP: CPT

## 2018-05-05 PROCEDURE — 87633 RESP VIRUS 12-25 TARGETS: CPT

## 2018-05-05 PROCEDURE — 87040 BLOOD CULTURE FOR BACTERIA: CPT

## 2018-05-05 PROCEDURE — 86900 BLOOD TYPING SEROLOGIC ABO: CPT

## 2018-05-05 PROCEDURE — 80074 ACUTE HEPATITIS PANEL: CPT

## 2018-05-05 PROCEDURE — 76700 US EXAM ABDOM COMPLETE: CPT

## 2018-05-05 PROCEDURE — 80053 COMPREHEN METABOLIC PANEL: CPT

## 2018-05-05 PROCEDURE — 87486 CHLMYD PNEUM DNA AMP PROBE: CPT

## 2018-05-05 PROCEDURE — 92941 PRQ TRLML REVSC TOT OCCL AMI: CPT | Mod: RC

## 2018-05-05 PROCEDURE — 83735 ASSAY OF MAGNESIUM: CPT

## 2018-05-05 PROCEDURE — C1894: CPT

## 2018-05-05 PROCEDURE — 80048 BASIC METABOLIC PNL TOTAL CA: CPT

## 2018-05-05 PROCEDURE — 85730 THROMBOPLASTIN TIME PARTIAL: CPT

## 2018-05-05 PROCEDURE — 76937 US GUIDE VASCULAR ACCESS: CPT

## 2018-05-05 PROCEDURE — 82803 BLOOD GASES ANY COMBINATION: CPT

## 2018-05-05 PROCEDURE — 96375 TX/PRO/DX INJ NEW DRUG ADDON: CPT

## 2018-05-05 PROCEDURE — C1725: CPT

## 2018-05-05 PROCEDURE — 86901 BLOOD TYPING SEROLOGIC RH(D): CPT

## 2018-05-05 PROCEDURE — 82010 KETONE BODYS QUAN: CPT

## 2018-05-05 PROCEDURE — 84295 ASSAY OF SERUM SODIUM: CPT

## 2018-05-05 PROCEDURE — 86850 RBC ANTIBODY SCREEN: CPT

## 2018-05-05 PROCEDURE — 87581 M.PNEUMON DNA AMP PROBE: CPT

## 2018-05-05 PROCEDURE — 84100 ASSAY OF PHOSPHORUS: CPT

## 2018-05-05 PROCEDURE — 82550 ASSAY OF CK (CPK): CPT

## 2018-05-05 PROCEDURE — 96374 THER/PROPH/DIAG INJ IV PUSH: CPT

## 2018-05-05 PROCEDURE — 74177 CT ABD & PELVIS W/CONTRAST: CPT

## 2018-05-05 PROCEDURE — 82435 ASSAY OF BLOOD CHLORIDE: CPT

## 2018-05-05 PROCEDURE — 85027 COMPLETE CBC AUTOMATED: CPT

## 2018-05-05 PROCEDURE — 82330 ASSAY OF CALCIUM: CPT

## 2018-05-05 PROCEDURE — P9047: CPT

## 2018-05-05 PROCEDURE — 84484 ASSAY OF TROPONIN QUANT: CPT

## 2018-05-05 PROCEDURE — 82962 GLUCOSE BLOOD TEST: CPT

## 2018-05-05 RX ORDER — OXYCODONE HYDROCHLORIDE 5 MG/1
5 TABLET ORAL ONCE
Qty: 0 | Refills: 0 | Status: DISCONTINUED | OUTPATIENT
Start: 2018-05-05 | End: 2018-05-05

## 2018-05-05 RX ADMIN — OXYCODONE HYDROCHLORIDE 5 MILLIGRAM(S): 5 TABLET ORAL at 05:31

## 2018-05-05 RX ADMIN — Medication 81 MILLIGRAM(S): at 12:37

## 2018-05-05 RX ADMIN — CLOPIDOGREL BISULFATE 75 MILLIGRAM(S): 75 TABLET, FILM COATED ORAL at 12:37

## 2018-05-05 RX ADMIN — PANTOPRAZOLE SODIUM 40 MILLIGRAM(S): 20 TABLET, DELAYED RELEASE ORAL at 05:31

## 2018-05-05 RX ADMIN — OXYCODONE HYDROCHLORIDE 5 MILLIGRAM(S): 5 TABLET ORAL at 03:33

## 2018-05-05 RX ADMIN — PIPERACILLIN AND TAZOBACTAM 25 GRAM(S): 4; .5 INJECTION, POWDER, LYOPHILIZED, FOR SOLUTION INTRAVENOUS at 09:43

## 2018-05-05 NOTE — PROGRESS NOTE ADULT - PROBLEM SELECTOR PLAN 1
-patient persistently has an AG despite having bs less than 250, so after her cath she can be transitioned to SQ insulin. Will see her again this evening to give recs for dosing.
-test BG AC/HS/2AM  -Pt to continue w/pump therapy in hospital and upon discharge
-test BG AC/HS/2AM  -Please document carb intake, bolus amounts and corrections  -f/u outpt w/Dr Pina Ley  -Pt can continue to use pump while inpatient
-test BG hourly until insulin gtt off and then AC/HS/2AM  -Give Lantus 12 units stat now (can turn gtt off 2 hours after Lantus received)  -Start Humalog 4 units AC meals (hold if not eating)  -Start Humalog low correction scale AC and Low HS scale  -Will restart insulin pump tmw AM (will follow up in morning)  -Add hypoglycemia protocol  -discussed plan w/pt and team  pager: [phone]
-test BG AC/HS/2AM  -Please document carb intake, bolus amount and correctional insulin   -Discontinued SQ insulin-pt to manage with pump at this time

## 2018-05-05 NOTE — DISCHARGE NOTE ADULT - MEDICATION SUMMARY - MEDICATIONS TO TAKE
I will START or STAY ON the medications listed below when I get home from the hospital:    aspirin 81 mg oral delayed release tablet  -- 1 tab(s) by mouth once a day  -- Indication: For CAD (coronary artery disease)    insulin lispro 100 units/mL subcutaneous solution  -- as per  insulin pump setting(Insert pump at 8 PM tonight)  Humalog 3-4 units pre meals until then  -- Indication: For Type 1 diabetes mellitus with hyperglycemia    atorvastatin 20 mg oral tablet  -- 1 tab(s) by mouth once a day (at bedtime)  -- Indication: For Hyperlipidemia    clopidogrel 75 mg oral tablet  -- 1 tab(s) by mouth once a day (at bedtime)  -- Indication: For CAD (coronary artery disease)    metoprolol tartrate 25 mg oral tablet  -- 1 tab(s) by mouth 2 times a day  -- Indication: For Essential hypertension    cinacalcet 60 mg oral tablet  -- 1 tab(s) by mouth once a day  -- Indication: For HPT     hydrALAZINE 50 mg oral tablet  -- 1 tab(s) by mouth every 8 hours  -- Indication: For Essential hypertension

## 2018-05-05 NOTE — PROGRESS NOTE ADULT - SUBJECTIVE AND OBJECTIVE BOX
Patient is a 62y old  Female who presents with a chief complaint of DKA/ Hypotension (29 Apr 2018 15:00)      SUBJECTIVE / OVERNIGHT EVENTS: Comfortable without new complaints. Wants to go home.  Review of Systems  chest pain no  palpitations no  sob no  nausea no  headache no    MEDICATIONS  (STANDING):  aspirin  chewable 81 milliGRAM(s) Oral daily  atorvastatin 80 milliGRAM(s) Oral at bedtime  clopidogrel Tablet 75 milliGRAM(s) Oral daily  dextrose 5%. 1000 milliLiter(s) (50 mL/Hr) IV Continuous <Continuous>  dextrose 50% Injectable 12.5 Gram(s) IV Push once  dextrose 50% Injectable 25 Gram(s) IV Push once  dextrose 50% Injectable 25 Gram(s) IV Push once  insulin lispro (HumaLOG) Pump 1 Each SubCutaneous Continuous Pump  pantoprazole    Tablet 40 milliGRAM(s) Oral before breakfast    MEDICATIONS  (PRN):  dextrose Gel 1 Dose(s) Oral once PRN Blood Glucose LESS THAN 70 milliGRAM(s)/deciliter  glucagon  Injectable 1 milliGRAM(s) IntraMuscular once PRN Glucose LESS THAN 70 milligrams/deciliter          PHYSICAL EXAM:  GENERAL: NAD, well-developed  HEAD:  Atraumatic, Normocephalic  EYES: EOMI, PERRLA, conjunctiva and sclera clear  NECK: Supple, No JVD  CHEST/LUNG: Clear to auscultation bilaterally; No wheeze  HEART: Regular rate and rhythm; No murmurs, rubs, or gallops  ABDOMEN: Soft, Nontender, Nondistended; Bowel sounds present  EXTREMITIES:  2+ Peripheral Pulses, No clubbing, cyanosis, or edema  PSYCH: AAOx3  NEUROLOGY: non-focal  SKIN: No rashes or lesions    LABS:                        10.3   5.10  )-----------( 196      ( 05 May 2018 08:18 )             32.6     05-05    134<L>  |  92<L>  |  17  ----------------------------<  224<H>  4.1   |  25  |  3.24<H>    Ca    8.0<L>      05 May 2018 06:55  Phos  3.4     05-05  Mg     2.0     05-05    TPro  6.4  /  Alb  3.4  /  TBili  0.2  /  DBili  x   /  AST  19  /  ALT  16  /  AlkPhos  75  05-03              RADIOLOGY & ADDITIONAL TESTS:    Imaging Personally Reviewed:    Consultant(s) Notes Reviewed:      Care Discussed with Consultants/Other Providers:

## 2018-05-05 NOTE — PROGRESS NOTE ADULT - SUBJECTIVE AND OBJECTIVE BOX
[facility]   [phone]  DIALYSIS NOTE  Chief Complaint: ESRD/Ongoing hemodialysis requirement.     24 hour events/subjective:      eating pizza  states feels well           ALLERGIES & MEDICATIONS  --------------------------------------------------------------------------------  Allergies    No Known Allergies    Intolerances      Standing Inpatient Medications  aspirin  chewable 81 milliGRAM(s) Oral daily  atorvastatin 80 milliGRAM(s) Oral at bedtime  clopidogrel Tablet 75 milliGRAM(s) Oral daily  dextrose 5%. 1000 milliLiter(s) IV Continuous <Continuous>  dextrose 50% Injectable 12.5 Gram(s) IV Push once  dextrose 50% Injectable 25 Gram(s) IV Push once  dextrose 50% Injectable 25 Gram(s) IV Push once  insulin lispro (HumaLOG) Pump 1 Each SubCutaneous Continuous Pump  pantoprazole    Tablet 40 milliGRAM(s) Oral before breakfast    PRN Inpatient Medications  dextrose Gel 1 Dose(s) Oral once PRN  glucagon  Injectable 1 milliGRAM(s) IntraMuscular once PRN      REVIEW OF SYSTEMS  --------------------------------------------------------------------------------  no itching or rash  no fever or chill  no cp or palp   no sob or cough   no N/V/D/ no abd pain   ext no edema no pain         VITALS/PHYSICAL EXAM  --------------------------------------------------------------------------------  T(C): 36.7 (05-05-18 @ 08:27), Max: 37 (05-04-18 @ 18:13)  HR: 88 (05-05-18 @ 08:27) (68 - 89)  BP: 107/71 (05-05-18 @ 08:27) (107/71 - 158/86)  RR: 18 (05-05-18 @ 08:27) (18 - 18)  SpO2: 100% (05-05-18 @ 08:27) (97% - 100%)  Wt(kg): --        05-04-18 @ 07:01  -  05-05-18 @ 07:00  --------------------------------------------------------  IN: 500 mL / OUT: 1500 mL / NET: -1000 mL    05-05-18 @ 07:01  -  05-05-18 @ 12:59  --------------------------------------------------------  IN: 100 mL / OUT: 0 mL / NET: 100 mL      Physical Exam:  		    	Gen: alert oriented place person and date   	Pulm: Decreased breath sounds b/l bases no rales or ronchi  	CV: RRR, S1/S2  	Abd: +BS, soft, nontender/nondistended  	Extremity: No cyanosis, no edema no clubbing  	Neuro: No focal deficits  	    LABS/STUDIES  --------------------------------------------------------------------------------              10.3   5.10  >-----------<  196      [05-05-18 @ 08:18]              32.6     134  |  92  |  17  ----------------------------<  224      [05-05-18 @ 06:55]  4.1   |  25  |  3.24        Ca     8.0     [05-05-18 @ 06:55]      Mg     2.0     [05-05-18 @ 06:55]      Phos  3.4     [05-05-18 @ 06:55]    TPro  6.4  /  Alb  3.4  /  TBili  0.2  /  DBili  x   /  AST  19  /  ALT  16  /  AlkPhos  75  [05-03-18 @ 23:34]                  imp/suggest: ESRD      Hemodialysis Prescription:  	Access: avf  	Dialyzer: revaclear 300  	Blood Flow (mL/Min): 400  	Dialysate Flow (mL/Min): 600  	Target UF (Liters): 1 liter   	Treatment Time:3.5 hr  	Potassium: 2k   	Calcium: 2.5  	  YOLANDA    Vitamin D     continue with hd   see hd flow sheet

## 2018-05-05 NOTE — DISCHARGE NOTE ADULT - PATIENT PORTAL LINK FT
You can access the [facility] Patient Portal, offered by [facility], by registering with the following website: [email or web address]

## 2018-05-05 NOTE — DISCHARGE NOTE ADULT - HOSPITAL COURSE
62 yr old female with stated PMH significant for insulin dependent DM, ESRD on HD, HFrEF, frequent admissions for DKA, last in February who presented to the ED with AMS/Hypotension/hyperglycemic associated with coffee ground emesis and watery stools. Found to have HAGMA, hypoglycemia of 1300. Admitted for DKA, w/pump dislodged s/p PCI. Pt transitioned back to insulin pump. Plan for patient to get HD session today and discharge thereafter with follow up to Outpt Endocrine Dr. Ley, Cardiology, Nephrology and PMD

## 2018-05-05 NOTE — DISCHARGE NOTE ADULT - PLAN OF CARE
Resolved Continue Insulin pump management as directed  Please follow up with Dr. Ley within 2-5 days of discharge   Please maintain a diabetic diet   HgA1C this admission was 7.1   Make sure you get your HgA1c checked every three months.  If you take insulin, check your blood glucose before meals and at bedtime.  It's important not to skip any meals.  Keep a log of your blood glucose results and always take it with you to your doctor appointments.   this medication list with you to all your doctor appointments.  If you have not seen your ophthalmologist this year call for appointment.  Check your feet daily for redness, sores, or openings. Do not self treat. If no improvement in two days   Low blood sugar (hypoglycemia) is a blood sugar below 70mg/dl. Check your blood sugar if you feel signs/symptoms of hypoglycemia. If your blood sugar is below 70 take 15 grams of carbohydrates (ex 4 oz of apple juice, 3-4 glucose tablets, or 4-6 oz of regular soda) wait 15 minutes and repeat blood sugar to make sure it comes up above 70.  If your blood sugar is above 70 and you are due for a meal, have a meal.  If you are not due for a meal have a snack.  This snack helps keeps your blood sugar at a safe range. EKG shows ischemic changes and troponin positive sp PCI   Please continue ASA, Simvastatin, Plavix and Metoprolol Hemodialysis is the most common method used to treat advanced and permanent kidney failure. But even with better procedures and equipment, hemodialysis is still a complicated and inconvenient therapy that requires a coordinated effort from your whole health care team, including your nephrologist, dialysis nurse, dialysis technician, dietitian, and . The most important members of your health care team are you and your family.   Healthy kidneys clean your blood by removing excess fluid, minerals, and wastes. When your kidneys fail, harmful wastes build up in your body, your blood pressure may rise, and your body may retain excess fluid and not make enough red blood cells. When this happens, you need treatment to replace the work of your failed kidneys. In hemodialysis, your blood is allowed to flow, a few ounces at a time, through a special filter that removes wastes and extra fluids. The clean blood is then returned to your body. One of the biggest adjustments you must make when you start hemodialysis treatments is following a strict schedule. Most patients go to a dialysis center three times a week for 3 to 5 or more hours each visit.   Some of the more common conditions caused by kidney failure are extreme tiredness, bone problems, joint problems, itching, and "restless legs”.  Many people treated with hemodialysis complain of itchy skin, which is often worse during or just after treatment.  Patients on dialysis often have insomnia, and some people have a specific problem called the sleep apnea syndrome.  Over time, these sleep disturbances can lead to "day-night reversal" (insomnia at night, sleepiness during the day), headache, depression, and decreased alertness.   Sleep disorders may seem unimportant, but they can impair your quality of life. Don't hesitate to raise these problems with your nurse, doctor, or  Coronary artery disease is a condition where the arteries the supply the heart muscle get clogged with fatty deposits & puts you at risk for a heart attack  Call your doctor if you have any new pain, pressure, or discomfort in the center of your chest, pain, tingling or discomfort in arms, back, neck, jaw, or stomach, shortness of breath, nausea, vomiting, burping or heartburn, sweating, cold and clammy skin, racing or abnormal heartbeat for more than 10 minutes or if they keep coming & going.  Call 911 and do not tr to get to hospital by care  You can help yourself with lefestyle changes (quitting smoking if you smoke), eat lots of fruits & vegetables & low fat dairy products, not a lot of meat & fatty foods, walk or some form of physical activity most days of the week, lose weight if you are overweight  Take your cardiac medication as prescribed to lower cholesterol, to lower blood pressure, aspirin to prevent blood clots, and diabetes control  Make sure to keep appointments with doctor for cardiac follow up care Low salt diet  Activity as tolerated.  Take all medication as prescribed.  Follow up with your medical doctor for routine blood pressure monitoring at your next visit.  Notify your doctor if you have any of the following symptoms:   Dizziness, Lightheadedness, Blurry vision, Headache, Chest pain, Shortness of breath Weigh yourself daily.  If you gain 3lbs in 3 days, or 5lbs in a week call your Health Care Provider.  Do not eat or drink foods containing more than 2000mg of salt (sodium) in your diet every day.  Call your Health Care Provider if you have any swelling or increased swelling in your feet, ankles, and/or stomach.  Take all of your medication as directed.  If you become dizzy call your Health Care Provider.

## 2018-05-05 NOTE — PROGRESS NOTE ADULT - ASSESSMENT
62yoF known IDDM admitted in DKA, MOHIT on CRF, and a NSTEMI s/p RCI balloon angioplasty, who remains on insulin and heparin gtts.     Neuro: AMS resolved as DKA is resolving  Mental status now improve and back at baseline    CV: Hx MI, CAD s/p PCI Ostial RCA for recurrence stenosis, HTN, now a/w NSTEMI s/p RCA Balloon Angioplasty  -c/w plavix , asa, statin  -reintroduce beta blockers as tolerated  -f/u cardiology and interventional cardiology    Pulm: No active pulmonary issues  -c/w monitoring  -chest PT / Incentive spirometry / out of bed to chair    Endocrine: Insulin Dependent Diabetes- uses an insulin pump at home, now a/w DKA- DKA resoloved  -PO diet  -start Lantus 12U now  -start pre-meal humolog- 4U pre-meal  -low dose insulin sliding scale  -c/w dka protocol- insulin gtt over an additional 2 hours s/p lantus administration- than d/c  -f/u endocrine    Renal: ESRD on HD 4 x week , MOHIT on CKD  -f/u renal - HD as per renal    R/O GIB- no active bleeding / H/H stable / no further n/v, no melena or brbpr  -c/w protonix twice daily for now      Sepsis: RVP neg, BC negative, possible aspiration in the setting of having an AMS during ketoacidosis event  -c/w assessing for infection  -off zosyn (5 day total)  DC home after HD. Follow with PMD/Endocrine/Cardiology in 3-4 days. QA  Pierce Viera MD pager [phone]

## 2018-05-05 NOTE — PROGRESS NOTE ADULT - SUBJECTIVE AND OBJECTIVE BOX
Diabetes Follow up note:  Interval Hx:  62 year old female w/uncontrolled T1DM w/complications (well known to team from prior admissions) here w/n/v found to be in DKA w/pump dislodged s/p PCI. Pt transitioned back to insulin pump. Plan for patient to get HD session today and discharge thereafter. BG values remain variable. Pt reports appetite increased and ate prior to FS testing again today. Took 2 bolus corrections for elevated glucose. Offers no complaints.     Review of Systems:  General: "I'm going home"  GI: Tolerating POs without any N/V/D/ABD PAIN.  CV: No CP/SOB  ENDO: No S&Sx of hypoglycemia  MEDS:  atorvastatin 80 milliGRAM(s) Oral at bedtime    insulin lispro (HumaLOG) Pump 1 Each SubCutaneous Continuous Pump      Allergies    No Known Allergies        PE:  General: Female lying in bed. NAD.   Vital Signs Last 24 Hrs  T(C): 36.7 (05 May 2018 08:27), Max: 37 (04 May 2018 18:13)  T(F): 98 (05 May 2018 08:27), Max: 98.6 (04 May 2018 18:13)  HR: 88 (05 May 2018 08:27) (68 - 89)  BP: 107/71 (05 May 2018 08:27) (99/452 - 158/86)  BP(mean): --  RR: 18 (05 May 2018 08:27) (17 - 18)  SpO2: 100% (05 May 2018 08:27) (97% - 100%)  Abd: Soft, NT,ND, pump intact.   Extremities: Warm  Neuro: A&O X3    LABS:    POCT Blood Glucose.: 249 mg/dL (05-05-18 @ 08:07)  POCT Blood Glucose.: 180 mg/dL (05-05-18 @ 02:23)  POCT Blood Glucose.: 217 mg/dL (05-04-18 @ 21:28)  POCT Blood Glucose.: 183 mg/dL (05-04-18 @ 17:07)  POCT Blood Glucose.: 122 mg/dL (05-04-18 @ 13:15)  POCT Blood Glucose.: 214 mg/dL (05-04-18 @ 08:43)  POCT Blood Glucose.: 107 mg/dL (05-04-18 @ 02:31)  POCT Blood Glucose.: 341 mg/dL (05-03-18 @ 21:55)  POCT Blood Glucose.: 186 mg/dL (05-03-18 @ 17:20)  POCT Blood Glucose.: 401 mg/dL (05-03-18 @ 17:17)  POCT Blood Glucose.: 250 mg/dL (05-03-18 @ 13:14)  POCT Blood Glucose.: 308 mg/dL (05-03-18 @ 08:47)  POCT Blood Glucose.: 463 mg/dL (05-03-18 @ 08:45)  POCT Blood Glucose.: 299 mg/dL (05-03-18 @ 08:43)  POCT Blood Glucose.: 104 mg/dL (05-02-18 @ 21:10)  POCT Blood Glucose.: 90 mg/dL (05-02-18 @ 21:09)  POCT Blood Glucose.: 106 mg/dL (05-02-18 @ 15:44)  POCT Blood Glucose.: 136 mg/dL (05-02-18 @ 11:56)                            10.3   5.10  )-----------( 196      ( 05 May 2018 08:18 )             32.6       05-05    134<L>  |  92<L>  |  17  ----------------------------<  224<H>  4.1   |  25  |  3.24<H>    Ca    8.0<L>      05 May 2018 06:55  Phos  3.4     05-05  Mg     2.0     05-05    TPro  6.4  /  Alb  3.4  /  TBili  0.2  /  DBili  x   /  AST  19  /  ALT  16  /  AlkPhos  75  05-03          Hemoglobin A1C, Whole Blood: 7.1 % <H> [4.0 - 5.6] (03-01-18 @ 01:59)            Contact number: isabel [phone] or [phone]

## 2018-05-05 NOTE — DISCHARGE NOTE ADULT - SECONDARY DIAGNOSIS.
ACS (acute coronary syndrome) ESRD (end stage renal disease) on dialysis CAD (coronary artery disease) Essential hypertension Heart failure with reduced ejection fraction, NYHA class II

## 2018-05-05 NOTE — DISCHARGE NOTE ADULT - CARE PROVIDER_API CALL
Pina Ley (SUSAN), EndocrinologyMetabDiabetes  [address] [place] [address]  Phone: [phone]  Fax: [phone] Pina Ley (SUSAN), EndocrinologyMetabDiabetes  [address] [place] [address]  Phone: [phone]  Fax: [phone]    Mauro Vela (MD), Cardiovascular Disease; Internal Medicine; Interventional Cardiology  [address] [address]  Phone: [phone]  Fax: [phone]

## 2018-05-05 NOTE — PROGRESS NOTE ADULT - PROBLEM SELECTOR PLAN 2
-reviewed bolus history and pump settings on pump  -can be discharged w/current pump settings.  -next site change due tomorrow  -f/u outpt w/Dr Pina Ley  -discussed w/pt and medicine NP  pager: [phone]
-c/w current pump settings. Advised patient to only bolus w/meals and bedtime and can give herself correction overnight.   -next site change due 5/6-pt has supplies at bedside  -discussed w/pt and RN. Please page me with any questions  pager: [phone]
-Restarted pump w/home settings:  Basal:  12am: 0.275  0500: 0.375  I:C-1:15  ISF: 12am: 60  7am: 40  6pm: 60  BG target: 12am 110-130  0800: 100-120  Active Insulin 6 hours  -please notify endocrine team if any issues w/insulin pump  -pt to follow up outpt w/Dr Pina Ley  -discussed plan w/pt and team  pager: [phone]

## 2018-05-05 NOTE — PROGRESS NOTE ADULT - PROVIDER SPECIALTY LIST ADULT
Cardiology
Endocrinology
Internal Medicine
MICU
Nephrology
MICU
Nephrology
Internal Medicine
Endocrinology
Endocrinology

## 2018-05-05 NOTE — DISCHARGE NOTE ADULT - CARE PLAN
Principal Discharge DX:	DKA (diabetic ketoacidoses)  Goal:	Resolved  Assessment and plan of treatment:	Continue Insulin pump management as directed  Please follow up with Dr. Ley within 2-5 days of discharge   Please maintain a diabetic diet   HgA1C this admission was 7.1   Make sure you get your HgA1c checked every three months.  If you take insulin, check your blood glucose before meals and at bedtime.  It's important not to skip any meals.  Keep a log of your blood glucose results and always take it with you to your doctor appointments.   this medication list with you to all your doctor appointments.  If you have not seen your ophthalmologist this year call for appointment.  Check your feet daily for redness, sores, or openings. Do not self treat. If no improvement in two days   Low blood sugar (hypoglycemia) is a blood sugar below 70mg/dl. Check your blood sugar if you feel signs/symptoms of hypoglycemia. If your blood sugar is below 70 take 15 grams of carbohydrates (ex 4 oz of apple juice, 3-4 glucose tablets, or 4-6 oz of regular soda) wait 15 minutes and repeat blood sugar to make sure it comes up above 70.  If your blood sugar is above 70 and you are due for a meal, have a meal.  If you are not due for a meal have a snack.  This snack helps keeps your blood sugar at a safe range.  Secondary Diagnosis:	ACS (acute coronary syndrome)  Assessment and plan of treatment:	EKG shows ischemic changes and troponin positive sp PCI   Please continue ASA, Simvastatin, Plavix and Metoprolol  Secondary Diagnosis:	ESRD (end stage renal disease) on dialysis  Assessment and plan of treatment:	Hemodialysis is the most common method used to treat advanced and permanent kidney failure. But even with better procedures and equipment, hemodialysis is still a complicated and inconvenient therapy that requires a coordinated effort from your whole health care team, including your nephrologist, dialysis nurse, dialysis technician, dietitian, and . The most important members of your health care team are you and your family.   Healthy kidneys clean your blood by removing excess fluid, minerals, and wastes. When your kidneys fail, harmful wastes build up in your body, your blood pressure may rise, and your body may retain excess fluid and not make enough red blood cells. When this happens, you need treatment to replace the work of your failed kidneys. In hemodialysis, your blood is allowed to flow, a few ounces at a time, through a special filter that removes wastes and extra fluids. The clean blood is then returned to your body. One of the biggest adjustments you must make when you start hemodialysis treatments is following a strict schedule. Most patients go to a dialysis center three times a week for 3 to 5 or more hours each visit.   Some of the more common conditions caused by kidney failure are extreme tiredness, bone problems, joint problems, itching, and "restless legs”.  Many people treated with hemodialysis complain of itchy skin, which is often worse during or just after treatment.  Patients on dialysis often have insomnia, and some people have a specific problem called the sleep apnea syndrome.  Over time, these sleep disturbances can lead to "day-night reversal" (insomnia at night, sleepiness during the day), headache, depression, and decreased alertness.   Sleep disorders may seem unimportant, but they can impair your quality of life. Don't hesitate to raise these problems with your nurse, doctor, or   Secondary Diagnosis:	CAD (coronary artery disease)  Assessment and plan of treatment:	Coronary artery disease is a condition where the arteries the supply the heart muscle get clogged with fatty deposits & puts you at risk for a heart attack  Call your doctor if you have any new pain, pressure, or discomfort in the center of your chest, pain, tingling or discomfort in arms, back, neck, jaw, or stomach, shortness of breath, nausea, vomiting, burping or heartburn, sweating, cold and clammy skin, racing or abnormal heartbeat for more than 10 minutes or if they keep coming & going.  Call 911 and do not tr to get to hospital by care  You can help yourself with lefestyle changes (quitting smoking if you smoke), eat lots of fruits & vegetables & low fat dairy products, not a lot of meat & fatty foods, walk or some form of physical activity most days of the week, lose weight if you are overweight  Take your cardiac medication as prescribed to lower cholesterol, to lower blood pressure, aspirin to prevent blood clots, and diabetes control  Make sure to keep appointments with doctor for cardiac follow up care  Secondary Diagnosis:	Essential hypertension  Assessment and plan of treatment:	Low salt diet  Activity as tolerated.  Take all medication as prescribed.  Follow up with your medical doctor for routine blood pressure monitoring at your next visit.  Notify your doctor if you have any of the following symptoms:   Dizziness, Lightheadedness, Blurry vision, Headache, Chest pain, Shortness of breath  Secondary Diagnosis:	Heart failure with reduced ejection fraction, NYHA class II  Assessment and plan of treatment:	Weigh yourself daily.  If you gain 3lbs in 3 days, or 5lbs in a week call your Health Care Provider.  Do not eat or drink foods containing more than 2000mg of salt (sodium) in your diet every day.  Call your Health Care Provider if you have any swelling or increased swelling in your feet, ankles, and/or stomach.  Take all of your medication as directed.  If you become dizzy call your Health Care Provider.

## 2018-05-05 NOTE — PROGRESS NOTE ADULT - PROBLEM SELECTOR PROBLEM 1
Type 1 diabetes mellitus with hyperglycemia
Type 1 diabetes mellitus with ketoacidosis without coma
Type 1 diabetes mellitus with hyperglycemia
Type 1 diabetes mellitus with ketoacidosis without coma
Type 1 diabetes mellitus with ketoacidosis without coma

## 2018-05-05 NOTE — PROGRESS NOTE ADULT - ASSESSMENT
61 yo female with T1DM uncontrolled with PVD s/p b/l fem-pop, CVA/TIA, CAD with MIx8 s/p PCI with stenting, and ESRD on HD here with 1 day of n/v of coffee ground/bilious fluid found to be in DKA due to insulin pump dislodgement s/p PCI w/stenting (5/1) on IV abx for possible aspiration. Pt going home today. Variable glycemic control on pump but no pattern that warrants further adjustments. BG goal (100-200mg/dl).

## 2018-06-09 ENCOUNTER — EMERGENCY (EMERGENCY)
Facility: HOSPITAL | Age: 63
LOS: 1 days | Discharge: HOME | End: 2018-06-09
Attending: EMERGENCY MEDICINE
Payer: MEDICARE

## 2018-06-09 VITALS
SYSTOLIC BLOOD PRESSURE: 148 MMHG | TEMPERATURE: 98 F | DIASTOLIC BLOOD PRESSURE: 72 MMHG | OXYGEN SATURATION: 99 % | RESPIRATION RATE: 18 BRPM | HEART RATE: 72 BPM

## 2018-06-09 VITALS
RESPIRATION RATE: 16 BRPM | HEART RATE: 78 BPM | OXYGEN SATURATION: 98 % | SYSTOLIC BLOOD PRESSURE: 118 MMHG | DIASTOLIC BLOOD PRESSURE: 72 MMHG

## 2018-06-09 DIAGNOSIS — Z98.89 OTHER SPECIFIED POSTPROCEDURAL STATES: Chronic | ICD-10-CM

## 2018-06-09 LAB
ALBUMIN SERPL ELPH-MCNC: 3.9 G/DL — SIGNIFICANT CHANGE UP (ref 3.3–5)
ALP SERPL-CCNC: 143 U/L — HIGH (ref 40–120)
ALT FLD-CCNC: 8 U/L — LOW (ref 10–45)
ANION GAP SERPL CALC-SCNC: 20 MMOL/L — HIGH (ref 5–17)
AST SERPL-CCNC: 14 U/L — SIGNIFICANT CHANGE UP (ref 10–40)
BASE EXCESS BLDV CALC-SCNC: 2.3 MMOL/L — HIGH (ref -2–2)
BILIRUB SERPL-MCNC: 0.2 MG/DL — SIGNIFICANT CHANGE UP (ref 0.2–1.2)
BUN SERPL-MCNC: 47 MG/DL — HIGH (ref 7–23)
CA-I SERPL-SCNC: 0.97 MMOL/L — LOW (ref 1.12–1.3)
CALCIUM SERPL-MCNC: 7.8 MG/DL — LOW (ref 8.4–10.5)
CHLORIDE BLDV-SCNC: 94 MMOL/L — LOW (ref 96–108)
CHLORIDE SERPL-SCNC: 91 MMOL/L — LOW (ref 96–108)
CO2 BLDV-SCNC: 30 MMOL/L — SIGNIFICANT CHANGE UP (ref 22–30)
CO2 SERPL-SCNC: 26 MMOL/L — SIGNIFICANT CHANGE UP (ref 22–31)
CREAT SERPL-MCNC: 7.46 MG/DL — HIGH (ref 0.5–1.3)
GAS PNL BLDV: 136 MMOL/L — SIGNIFICANT CHANGE UP (ref 136–145)
GAS PNL BLDV: SIGNIFICANT CHANGE UP
GAS PNL BLDV: SIGNIFICANT CHANGE UP
GLUCOSE BLDV-MCNC: 218 MG/DL — HIGH (ref 70–99)
GLUCOSE SERPL-MCNC: 247 MG/DL — HIGH (ref 70–99)
HCO3 BLDV-SCNC: 29 MMOL/L — SIGNIFICANT CHANGE UP (ref 21–29)
HCT VFR BLD CALC: 32.9 % — LOW (ref 34.5–45)
HCT VFR BLDA CALC: 34 % — LOW (ref 39–50)
HGB BLD CALC-MCNC: 10.9 G/DL — LOW (ref 11.5–15.5)
HGB BLD-MCNC: 10.8 G/DL — LOW (ref 11.5–15.5)
LACTATE BLDV-MCNC: 1.8 MMOL/L — SIGNIFICANT CHANGE UP (ref 0.7–2)
MCHC RBC-ENTMCNC: 32.5 PG — SIGNIFICANT CHANGE UP (ref 27–34)
MCHC RBC-ENTMCNC: 32.8 GM/DL — SIGNIFICANT CHANGE UP (ref 32–36)
MCV RBC AUTO: 99 FL — SIGNIFICANT CHANGE UP (ref 80–100)
OTHER CELLS CSF MANUAL: 5 ML/DL — LOW (ref 18–22)
PCO2 BLDV: 58 MMHG — HIGH (ref 35–50)
PH BLDV: 7.32 — LOW (ref 7.35–7.45)
PLATELET # BLD AUTO: 259 K/UL — SIGNIFICANT CHANGE UP (ref 150–400)
PO2 BLDV: 25 MMHG — SIGNIFICANT CHANGE UP (ref 25–45)
POTASSIUM BLDV-SCNC: 4.9 MMOL/L — SIGNIFICANT CHANGE UP (ref 3.5–5.3)
POTASSIUM SERPL-MCNC: 5.2 MMOL/L — SIGNIFICANT CHANGE UP (ref 3.5–5.3)
POTASSIUM SERPL-SCNC: 5.2 MMOL/L — SIGNIFICANT CHANGE UP (ref 3.5–5.3)
PROT SERPL-MCNC: 7 G/DL — SIGNIFICANT CHANGE UP (ref 6–8.3)
RBC # BLD: 3.33 M/UL — LOW (ref 3.8–5.2)
RBC # FLD: 13.5 % — SIGNIFICANT CHANGE UP (ref 10.3–14.5)
SAO2 % BLDV: 34 % — LOW (ref 67–88)
SODIUM SERPL-SCNC: 137 MMOL/L — SIGNIFICANT CHANGE UP (ref 135–145)
WBC # BLD: 14.2 K/UL — HIGH (ref 3.8–10.5)
WBC # FLD AUTO: 14.2 K/UL — HIGH (ref 3.8–10.5)

## 2018-06-09 PROCEDURE — 99284 EMERGENCY DEPT VISIT MOD MDM: CPT

## 2018-06-09 PROCEDURE — 82962 GLUCOSE BLOOD TEST: CPT

## 2018-06-09 PROCEDURE — 83605 ASSAY OF LACTIC ACID: CPT

## 2018-06-09 PROCEDURE — 85027 COMPLETE CBC AUTOMATED: CPT

## 2018-06-09 PROCEDURE — 82330 ASSAY OF CALCIUM: CPT

## 2018-06-09 PROCEDURE — 82435 ASSAY OF BLOOD CHLORIDE: CPT

## 2018-06-09 PROCEDURE — 84132 ASSAY OF SERUM POTASSIUM: CPT

## 2018-06-09 PROCEDURE — 99283 EMERGENCY DEPT VISIT LOW MDM: CPT

## 2018-06-09 PROCEDURE — 85014 HEMATOCRIT: CPT

## 2018-06-09 PROCEDURE — 80053 COMPREHEN METABOLIC PANEL: CPT

## 2018-06-09 PROCEDURE — 82947 ASSAY GLUCOSE BLOOD QUANT: CPT

## 2018-06-09 PROCEDURE — 84295 ASSAY OF SERUM SODIUM: CPT

## 2018-06-09 PROCEDURE — 82803 BLOOD GASES ANY COMBINATION: CPT

## 2018-06-09 NOTE — ED ADULT NURSE NOTE - OBJECTIVE STATEMENT
61 yo female adry by EMS after an episode of hypoglycemia at home 26 finger stick by EMS. Patient received glucagon from  total of 2mg, drank apple juice and sugar and at arrival, blood glucose 244. Patient does dialysis 4 times a week, due today, left arm precautions. Pt alerted and oriented x3, refused IV but agreed on blood test, as per  : "she is fine, she should not be here, we controlled the sugar at home". Pt on insuline pump, will eat again and take another blood sugar test in one hour. No signs of distress noted, will continue to monitor closely

## 2018-06-12 NOTE — ED PROVIDER NOTE - MEDICAL DECISION MAKING DETAILS
Hypoglycemia likely 2/2 insulin gtt and decreased PO.  Asymptomatic at this time.  Patient FS 240s.  Will check labs, start insulin gtt, and reassess FS in 1 hr to assess for rebound hypoglycemia.  --BMM

## 2018-06-12 NOTE — ED PROVIDER NOTE - PHYSICAL EXAMINATION
GENERAL: AAOx4, GCS 15, NAD, WDWN; HEENT: MMM, no jugular venous distension, supple neck, PERRLA, EOMI, nonicteric sclera; PULM: CTA B, no crackles/rubs/rales; CV: RRR, S1S2, no MRG; ABD: Flat abdomen, NTND, no R/G/R, no CVAT.  MSK: CARR, +2 pulses x4;  NEURO: No obvious focal deficits; PSYCH: AAOx3, clear thought and normal sensorium. SKIN -- no evidence of rash, erythema, or discoloration

## 2018-06-12 NOTE — ED PROVIDER NOTE - OBJECTIVE STATEMENT
ACS/CAD, ESRD on HD, prior CVA, PVD, DM1, on insulin gtt, decreased PO today with normal insulin regimen.  Found altered by .  Given glucagon IM injection, EMS called after patient did not significantly improve, FS by EMS 22.  D50 and juice given.  Repeat .  Patient has no concerns or complaints at time of interview.  She has no recent f/c, n/v.  On dialysis 4x per week, no recent complications.  No dysuria, diarrhea, abd pain, rash, sweating, back pain, cough/congestion.  No recent changes to medications.

## 2018-06-13 ENCOUNTER — APPOINTMENT (OUTPATIENT)
Dept: CT IMAGING | Facility: IMAGING CENTER | Age: 63
End: 2018-06-13

## 2018-06-29 ENCOUNTER — RECORD ABSTRACTING (OUTPATIENT)
Age: 63
End: 2018-06-29

## 2018-06-29 DIAGNOSIS — Z72.0 TOBACCO USE: ICD-10-CM

## 2018-06-29 DIAGNOSIS — Z87.19 PERSONAL HISTORY OF OTHER DISEASES OF THE DIGESTIVE SYSTEM: ICD-10-CM

## 2018-06-29 DIAGNOSIS — Q21.1 ATRIAL SEPTAL DEFECT: ICD-10-CM

## 2018-06-29 RX ORDER — DULOXETINE HYDROCHLORIDE 30 MG/1
30 CAPSULE, DELAYED RELEASE PELLETS ORAL
Refills: 0 | Status: ACTIVE | COMMUNITY
Start: 2017-10-25

## 2018-06-29 RX ORDER — CINACALCET HYDROCHLORIDE 30 MG/1
30 TABLET, COATED ORAL
Refills: 0 | Status: ACTIVE | COMMUNITY
Start: 2017-04-25

## 2018-06-29 RX ORDER — LANTHANUM CARBONATE 1000 MG/1
1000 POWDER ORAL
Refills: 0 | Status: ACTIVE | COMMUNITY

## 2018-06-29 RX ORDER — URSODIOL 300 MG/1
300 CAPSULE ORAL
Refills: 0 | Status: ACTIVE | COMMUNITY

## 2018-07-02 ENCOUNTER — INPATIENT (INPATIENT)
Facility: HOSPITAL | Age: 63
LOS: 6 days | Discharge: HOME | DRG: 628 | End: 2018-07-09
Attending: STUDENT IN AN ORGANIZED HEALTH CARE EDUCATION/TRAINING PROGRAM | Admitting: STUDENT IN AN ORGANIZED HEALTH CARE EDUCATION/TRAINING PROGRAM
Payer: MEDICARE

## 2018-07-02 VITALS — HEIGHT: 63 IN | WEIGHT: 121.92 LBS

## 2018-07-02 DIAGNOSIS — Z98.89 OTHER SPECIFIED POSTPROCEDURAL STATES: Chronic | ICD-10-CM

## 2018-07-02 DIAGNOSIS — R10.9 UNSPECIFIED ABDOMINAL PAIN: ICD-10-CM

## 2018-07-02 LAB
ALBUMIN SERPL ELPH-MCNC: 4.4 G/DL — SIGNIFICANT CHANGE UP (ref 3.3–5)
ALBUMIN SERPL ELPH-MCNC: 4.4 G/DL — SIGNIFICANT CHANGE UP (ref 3.3–5)
ALP SERPL-CCNC: 182 U/L — HIGH (ref 40–120)
ALP SERPL-CCNC: 184 U/L — HIGH (ref 40–120)
ALT FLD-CCNC: 8 U/L — LOW (ref 10–45)
ALT FLD-CCNC: 8 U/L — LOW (ref 10–45)
ANION GAP SERPL CALC-SCNC: 25 MMOL/L — HIGH (ref 5–17)
ANION GAP SERPL CALC-SCNC: 27 MMOL/L — HIGH (ref 5–17)
APTT BLD: 23.2 SEC — LOW (ref 27.5–37.4)
AST SERPL-CCNC: 24 U/L — SIGNIFICANT CHANGE UP (ref 10–40)
AST SERPL-CCNC: 25 U/L — SIGNIFICANT CHANGE UP (ref 10–40)
BASE EXCESS BLDV CALC-SCNC: -1.5 MMOL/L — SIGNIFICANT CHANGE UP (ref -2–2)
BASOPHILS # BLD AUTO: 0 K/UL — SIGNIFICANT CHANGE UP (ref 0–0.2)
BASOPHILS NFR BLD AUTO: 0.5 % — SIGNIFICANT CHANGE UP (ref 0–2)
BILIRUB SERPL-MCNC: 0.3 MG/DL — SIGNIFICANT CHANGE UP (ref 0.2–1.2)
BILIRUB SERPL-MCNC: 0.4 MG/DL — SIGNIFICANT CHANGE UP (ref 0.2–1.2)
BUN SERPL-MCNC: 57 MG/DL — HIGH (ref 7–23)
BUN SERPL-MCNC: 59 MG/DL — HIGH (ref 7–23)
CA-I SERPL-SCNC: 0.92 MMOL/L — LOW (ref 1.12–1.3)
CALCIUM SERPL-MCNC: 7.6 MG/DL — LOW (ref 8.4–10.5)
CALCIUM SERPL-MCNC: 7.7 MG/DL — LOW (ref 8.4–10.5)
CHLORIDE BLDV-SCNC: 94 MMOL/L — LOW (ref 96–108)
CHLORIDE SERPL-SCNC: 88 MMOL/L — LOW (ref 96–108)
CHLORIDE SERPL-SCNC: 89 MMOL/L — LOW (ref 96–108)
CO2 BLDV-SCNC: 27 MMOL/L — SIGNIFICANT CHANGE UP (ref 22–30)
CO2 SERPL-SCNC: 19 MMOL/L — LOW (ref 22–31)
CO2 SERPL-SCNC: 20 MMOL/L — LOW (ref 22–31)
CREAT SERPL-MCNC: 8.13 MG/DL — HIGH (ref 0.5–1.3)
CREAT SERPL-MCNC: 8.35 MG/DL — HIGH (ref 0.5–1.3)
EOSINOPHIL # BLD AUTO: 0.1 K/UL — SIGNIFICANT CHANGE UP (ref 0–0.5)
EOSINOPHIL NFR BLD AUTO: 1.3 % — SIGNIFICANT CHANGE UP (ref 0–6)
GAS PNL BLDV: 133 MMOL/L — LOW (ref 136–145)
GAS PNL BLDV: SIGNIFICANT CHANGE UP
GLUCOSE BLDV-MCNC: 311 MG/DL — HIGH (ref 70–99)
GLUCOSE SERPL-MCNC: 294 MG/DL — HIGH (ref 70–99)
GLUCOSE SERPL-MCNC: 339 MG/DL — HIGH (ref 70–99)
HCO3 BLDV-SCNC: 25 MMOL/L — SIGNIFICANT CHANGE UP (ref 21–29)
HCT VFR BLD CALC: 44.4 % — SIGNIFICANT CHANGE UP (ref 34.5–45)
HCT VFR BLDA CALC: 44 % — SIGNIFICANT CHANGE UP (ref 39–50)
HGB BLD CALC-MCNC: 14.2 G/DL — SIGNIFICANT CHANGE UP (ref 11.5–15.5)
HGB BLD-MCNC: 13.3 G/DL — SIGNIFICANT CHANGE UP (ref 11.5–15.5)
INR BLD: 0.9 RATIO — SIGNIFICANT CHANGE UP (ref 0.88–1.16)
LACTATE BLDV-MCNC: 1.7 MMOL/L — SIGNIFICANT CHANGE UP (ref 0.7–2)
LIDOCAIN IGE QN: 53 U/L — SIGNIFICANT CHANGE UP (ref 7–60)
LYMPHOCYTES # BLD AUTO: 1.1 K/UL — SIGNIFICANT CHANGE UP (ref 1–3.3)
LYMPHOCYTES # BLD AUTO: 18.3 % — SIGNIFICANT CHANGE UP (ref 13–44)
MCHC RBC-ENTMCNC: 30 GM/DL — LOW (ref 32–36)
MCHC RBC-ENTMCNC: 30.1 PG — SIGNIFICANT CHANGE UP (ref 27–34)
MCV RBC AUTO: 100 FL — SIGNIFICANT CHANGE UP (ref 80–100)
MONOCYTES # BLD AUTO: 0.6 K/UL — SIGNIFICANT CHANGE UP (ref 0–0.9)
MONOCYTES NFR BLD AUTO: 9.7 % — SIGNIFICANT CHANGE UP (ref 2–14)
NEUTROPHILS # BLD AUTO: 4.4 K/UL — SIGNIFICANT CHANGE UP (ref 1.8–7.4)
NEUTROPHILS NFR BLD AUTO: 70.1 % — SIGNIFICANT CHANGE UP (ref 43–77)
OTHER CELLS CSF MANUAL: 9 ML/DL — LOW (ref 18–22)
PCO2 BLDV: 54 MMHG — HIGH (ref 35–50)
PH BLDV: 7.3 — LOW (ref 7.35–7.45)
PLATELET # BLD AUTO: 312 K/UL — SIGNIFICANT CHANGE UP (ref 150–400)
PO2 BLDV: 31 MMHG — SIGNIFICANT CHANGE UP (ref 25–45)
POTASSIUM BLDV-SCNC: 6.5 MMOL/L — CRITICAL HIGH (ref 3.5–5.3)
POTASSIUM SERPL-MCNC: 6.6 MMOL/L — CRITICAL HIGH (ref 3.5–5.3)
POTASSIUM SERPL-MCNC: 7.4 MMOL/L — CRITICAL HIGH (ref 3.5–5.3)
POTASSIUM SERPL-SCNC: 6.6 MMOL/L — CRITICAL HIGH (ref 3.5–5.3)
POTASSIUM SERPL-SCNC: 7.4 MMOL/L — CRITICAL HIGH (ref 3.5–5.3)
PROT SERPL-MCNC: 7.9 G/DL — SIGNIFICANT CHANGE UP (ref 6–8.3)
PROT SERPL-MCNC: 8.1 G/DL — SIGNIFICANT CHANGE UP (ref 6–8.3)
PROTHROM AB SERPL-ACNC: 9.8 SEC — SIGNIFICANT CHANGE UP (ref 9.8–12.7)
RBC # BLD: 4.43 M/UL — SIGNIFICANT CHANGE UP (ref 3.8–5.2)
RBC # FLD: 14.1 % — SIGNIFICANT CHANGE UP (ref 10.3–14.5)
SAO2 % BLDV: 44 % — LOW (ref 67–88)
SODIUM SERPL-SCNC: 134 MMOL/L — LOW (ref 135–145)
SODIUM SERPL-SCNC: 134 MMOL/L — LOW (ref 135–145)
WBC # BLD: 6.2 K/UL — SIGNIFICANT CHANGE UP (ref 3.8–10.5)
WBC # FLD AUTO: 6.2 K/UL — SIGNIFICANT CHANGE UP (ref 3.8–10.5)

## 2018-07-02 PROCEDURE — 76830 TRANSVAGINAL US NON-OB: CPT | Mod: 26

## 2018-07-02 PROCEDURE — 36600 WITHDRAWAL OF ARTERIAL BLOOD: CPT

## 2018-07-02 PROCEDURE — 76705 ECHO EXAM OF ABDOMEN: CPT | Mod: 26

## 2018-07-02 PROCEDURE — 99285 EMERGENCY DEPT VISIT HI MDM: CPT | Mod: 25

## 2018-07-02 RX ORDER — ACETAMINOPHEN 500 MG
1000 TABLET ORAL ONCE
Qty: 0 | Refills: 0 | Status: COMPLETED | OUTPATIENT
Start: 2018-07-02 | End: 2018-07-02

## 2018-07-02 RX ORDER — INSULIN HUMAN 100 [IU]/ML
1 INJECTION, SOLUTION SUBCUTANEOUS
Qty: 100 | Refills: 0 | Status: DISCONTINUED | OUTPATIENT
Start: 2018-07-02 | End: 2018-07-03

## 2018-07-02 RX ORDER — ATORVASTATIN CALCIUM 80 MG/1
20 TABLET, FILM COATED ORAL AT BEDTIME
Qty: 0 | Refills: 0 | Status: DISCONTINUED | OUTPATIENT
Start: 2018-07-02 | End: 2018-07-03

## 2018-07-02 RX ORDER — CLOPIDOGREL BISULFATE 75 MG/1
75 TABLET, FILM COATED ORAL AT BEDTIME
Qty: 0 | Refills: 0 | Status: DISCONTINUED | OUTPATIENT
Start: 2018-07-02 | End: 2018-07-03

## 2018-07-02 RX ORDER — ASPIRIN/CALCIUM CARB/MAGNESIUM 324 MG
81 TABLET ORAL DAILY
Qty: 0 | Refills: 0 | Status: DISCONTINUED | OUTPATIENT
Start: 2018-07-02 | End: 2018-07-03

## 2018-07-02 RX ORDER — METOPROLOL TARTRATE 50 MG
50 TABLET ORAL DAILY
Qty: 0 | Refills: 0 | Status: DISCONTINUED | OUTPATIENT
Start: 2018-07-02 | End: 2018-07-03

## 2018-07-02 RX ORDER — LISINOPRIL 2.5 MG/1
40 TABLET ORAL DAILY
Qty: 0 | Refills: 0 | Status: DISCONTINUED | OUTPATIENT
Start: 2018-07-02 | End: 2018-07-03

## 2018-07-02 RX ORDER — INSULIN HUMAN 100 [IU]/ML
55 INJECTION, SOLUTION SUBCUTANEOUS
Qty: 100 | Refills: 0 | Status: DISCONTINUED | OUTPATIENT
Start: 2018-07-02 | End: 2018-07-02

## 2018-07-02 RX ADMIN — Medication 1000 MILLIGRAM(S): at 22:10

## 2018-07-02 RX ADMIN — Medication 400 MILLIGRAM(S): at 22:10

## 2018-07-02 NOTE — ED ADULT NURSE NOTE - OBJECTIVE STATEMENT
pt started this am after Dialysis today right sided and vaginal bleeding also.  NO n/v/d/ no fever and pt has left fistula and goes to dialysis mon Tuesday Thursday Saturday Unable to get IV but bloods sent as ordered  [place]

## 2018-07-02 NOTE — ED PROVIDER NOTE - SKIN, MLM
Skin normal color for race, warm, dry and intact. No evidence of rash. no redness or warmth at fistula site

## 2018-07-02 NOTE — ED PROVIDER NOTE - OBJECTIVE STATEMENT
61 y/o female hx ACS/CAD, ESRD on HD, prior CVA, PVD, DM1, on insulin pump who presents to the ED for 63 y/o female hx ACS/CAD, ESRD on HD4x per week, prior CVA, PVD, DM1, on insulin pump who presents to the ED for abdominal pain and vaginal bleeding. went to dialysis today as scheduled and developed some lower abdominal pain over the last few hours and noticed vaginal bleeding. no fever/chills/vomiting/diarrhea.. On ASA and plavix. last bm this am and was normal color. pshx - cholecystectomy.

## 2018-07-02 NOTE — ED PROVIDER NOTE - MEDICAL DECISION MAKING DETAILS
63 y/o female hx ACS/CAD, ESRD on HD4x per week, prior CVA, PVD, DM1, on insulin pump who presents to the ED for abdominal pain and vaginal bleeding. went to dialysis today as scheduled and developed some lower abdominal pain over the last few hours and noticed vaginal bleeding. no fever/chills/vomiting/diarrhea.. On ASA and plavix. patient is ill appearing with diffuse abdominal tenderness, particularly in RUQ. Vaginal exam with scant blood. will obtain labs, TVUS, RUQ US and CTAP and reevaluate. ZR 61 y/o female with a multiple medical issues   some lower abdominal pain over the last few hours and noticed vaginal bleeding. no fever/chills/vomiting/diarrhea.. On ASA and plavix. patient is ill appearing with diffuse abdominal tenderness, particularly in RUQ. Vaginal exam with scant blood. will obtain labs, TVUS, RUQ US and CTAP and reevaluate. ZR

## 2018-07-02 NOTE — ED ADULT NURSE REASSESSMENT NOTE - NS ED NURSE REASSESS COMMENT FT1
2157 pt returned form US and drinking for ct scan and pt filling out the paperwork for the insulin pump and endocrine called by KALI TORRES placed 22g and repeat bloods sent as ordered Jj

## 2018-07-02 NOTE — CONSULT NOTE ADULT - SUBJECTIVE AND OBJECTIVE BOX
asked to consult cor hyperkalemia asked to consult cor hyperkalemia   [facility]  [phone]  NEPHROLOGY      INITIAL CONSULT NOTE  --------------------------------------------------------------------------------  HPI:    62 yr old female with PMHx Insulin dependent DM (on insulin pump) with frequent hospitalization for DKA ESRD HTN, HLD, HFrEF (40 to 45%), CAD, MI, s/p PCI RCA PVD s/p Lt and Rt fem pop bypasses, subclavian vein stenosis s/p stent, CVA. who now presents with c/o abd pain with no diarrhea. with vaginal bleed. denies hx of kidney stones. In ER  noticed with hyperglycemia. also with hyperkalemia with all specimen so far revealing hemolysis. pt did receive hd 7/2 for 2.5 her 4th treatment of the week. renal consult called      PAST HISTORY  --------------------------------------------------------------------------------  PAST MEDICAL & SURGICAL HISTORY:  Subclavian vein stenosis, left: s/p stent  Cellulitis: 2015 left foot  DKA, type 1: 1/2015  ACS (acute coronary syndrome): 1/2015 - cath revealed 100% ostial stenosis not amenable to PCI - medical management  MI, old  TIA (transient ischemic attack): x 2 - 8-9 years ago prior to ASD/VSD repair  CAD (coronary artery disease): s/p stents  Murmur, cardiac  Gout: past  CVA (cerebral infarction): with no residual, 8 yrs ago, prior to heart surgery - ST memory loss  Peripheral vascular disease: occluded left fem-pop bypass 5/2015  Diabetes mellitus type 1: Insulin Dependent - Medtronic  Minimed Paradigm Insulin Pump - Novolog  ESRD (end stage renal disease): dialysis , tue, thursday, saturday  Hyperlipidemia  Status post device closure of ASD: &quot;[place]&quot;  History of cardiac catheterization: 1/2015 - no intervention  S/P femoral-popliteal bypass surgery: L and R in 2013 with graft; 5/2015 CFA angioplasty left and ileofemoral endarterectomywith vein patch angioplasty of left fem-pop bypass graft  Multiple vascular surgery both leg, left fempop bypass revision 11/2015  AV (arteriovenous fistula): Left AV graft; revision with stent placement 2-3 years ago  S/P cholecystectomy    FAMILY HISTORY:  Family history of smoking  Family history of hypertension  Family history of cancer (Sibling)    PAST SOCIAL HISTORY: denies current tobacco use no alcohol     ALLERGIES & MEDICATIONS  --------------------------------------------------------------------------------  Allergies    No Known Allergies    Intolerances      Standing Inpatient Medications  aspirin enteric coated 81 milliGRAM(s) Oral daily  atorvastatin 20 milliGRAM(s) Oral at bedtime  clopidogrel Tablet 75 milliGRAM(s) Oral at bedtime  insulin Infusion 1 Unit(s)/Hr IV Continuous <Continuous>  lisinopril 40 milliGRAM(s) Oral daily  metoprolol succinate ER 50 milliGRAM(s) Oral daily    PRN Inpatient Medications      REVIEW OF SYSTEMS  --------------------------------------------------------------------------------  Gen: No  fevers/chills   Skin: No rashes  Head/Eyes/Ears/Mouth: No headache; Normal hearing;  No sinus pain/discomfort, sore throat  Respiratory: No dyspnea, cough, wheezing, hemoptysis  CV: No chest pain, orthopnea  GI: + abdominal pain, -  diarrhea, nausea, vomiting, melena, hematochezia  : No dysuria,    + vaginal bleed today as well   MSK: No joint pain/swelling; no back pain  Neuro: No dizziness/lightheadedness, weakness, seizures, numbness, tingling  also with no edema     All other systems were reviewed and are negative, except as noted.    VITALS/PHYSICAL EXAM  --------------------------------------------------------------------------------  T(C): 36.7 (07-03-18 @ 00:33), Max: 36.8 (07-02-18 @ 22:35)  HR: 80 (07-03-18 @ 00:33) (77 - 85)  BP: 121/62 (07-03-18 @ 00:33) (115/70 - 132/66)  RR: 16 (07-03-18 @ 00:33) (16 - 20)  SpO2: 100% (07-03-18 @ 00:33) (96% - 100%)  Wt(kg): --  Height (cm): 160.02 (07-02-18 @ 18:57)  Weight (kg): 55.3 (07-02-18 @ 18:57)  BMI (kg/m2): 21.6 (07-02-18 @ 18:57)  BSA (m2): 1.57 (07-02-18 @ 18:57)      Physical Exam:  	Gen: alert and oriented   	no jvd , supple neck,   	Pulm: decrease bs  no rales or ronchi or wheezing  	CV: RRR, S1S2; no rub  	Back: No CVA tenderness; no sacral edema  	Abd: +BS, soft, nontender/nondistended  	: No suprapubic tenderness  	UE: Warm, no cyanosis  no clubbing,  no edema; no asterixis  	LE: Warm, no cyanosis  no clubbing, no edema  	Neuro: alert and oriented. speech coherent   	Psych: Normal affect and mood  	Skin: Warm, no decrease skin turgor   	Vascular access: LUE AVF + bruit and thrill     LABS/STUDIES  --------------------------------------------------------------------------------              13.3   6.2   >-----------<  312      [07-02-18 @ 20:43]              44.4     134  |  89  |  57  ----------------------------<  339      [07-02-18 @ 21:51]  7.4   |  20  |  8.35        Ca     7.6     [07-02-18 @ 21:51]    TPro  7.9  /  Alb  4.4  /  TBili  0.3  /  DBili  x   /  AST  24  /  ALT  8   /  AlkPhos  182  [07-02-18 @ 21:51]    PT/INR: PT 9.8  , INR 0.90       [07-02-18 @ 20:43]  PTT: 23.2       [07-02-18 @ 20:43]      Creatinine Trend:  SCr 8.35 [07-02 @ 21:51]  SCr 8.13 [07-02 @ 20:43]  SCr 7.46 [06-09 @ 04:41]    Urinalysis - [06-04-17 @ 08:24]      Color Yellow / Appearance Clear / SG 1.013 / pH 8.5      Gluc 1000 / Ketone Negative  / Bili Negative / Urobili Negative       Blood Negative / Protein >600 / Leuk Est Small / Nitrite Negative      RBC 3-5 / WBC 6-10 / Hyaline  / Gran  / Sq Epi  / Non Sq Epi OCC / Bacteria       PTH -- (Ca 8.3)      [03-03-18 @ 08:53]   134  HbA1c 7.1      [03-01-18 @ 01:59]  TSH 1.07      [12-19-17 @ 06:12]  Lipid: chol 113, TG 86, HDL 59, LDL 37      [04-30-18 @ 06:44]    HBsAb <3.0      [02-28-18 @ 22:17]  HBsAb Nonreact      [05-02-15 @ 15:35]  HBsAg Nonreact      [04-29-18 @ 23:43]  HBcAb Nonreact      [02-28-18 @ 22:17]  HCV 0.16, Nonreact      [04-29-18 @ 23:43]

## 2018-07-02 NOTE — ED PROVIDER NOTE - PROGRESS NOTE DETAILS
[paged endocrine regarding pump removal case discussed with endo fellow dr Martins  [phone] ,agree that ,pt in DKA ,asked to admit to ICU for insulin drip and close observation dr Daisy Webb renal called case discussed .asked to do ABG

## 2018-07-02 NOTE — ED ADULT NURSE REASSESSMENT NOTE - NS ED NURSE REASSESS COMMENT FT1
2300 repeat blood levels and pt to possible have insulin drip started and pt having MICU Pt has insulin pump report given to madison Gardner

## 2018-07-03 DIAGNOSIS — E10.10 TYPE 1 DIABETES MELLITUS WITH KETOACIDOSIS WITHOUT COMA: ICD-10-CM

## 2018-07-03 DIAGNOSIS — I10 ESSENTIAL (PRIMARY) HYPERTENSION: ICD-10-CM

## 2018-07-03 DIAGNOSIS — E78.5 HYPERLIPIDEMIA, UNSPECIFIED: ICD-10-CM

## 2018-07-03 LAB
ANION GAP SERPL CALC-SCNC: 13 MMOL/L — SIGNIFICANT CHANGE UP (ref 5–17)
ANION GAP SERPL CALC-SCNC: 17 MMOL/L — SIGNIFICANT CHANGE UP (ref 5–17)
ANION GAP SERPL CALC-SCNC: 18 MMOL/L — HIGH (ref 5–17)
ANION GAP SERPL CALC-SCNC: 22 MMOL/L — HIGH (ref 5–17)
ANION GAP SERPL CALC-SCNC: 23 MMOL/L — HIGH (ref 5–17)
APTT BLD: 29.3 SEC — SIGNIFICANT CHANGE UP (ref 27.5–37.4)
B-OH-BUTYR SERPL-SCNC: 0.2 MMOL/L — SIGNIFICANT CHANGE UP
B-OH-BUTYR SERPL-SCNC: 0.3 MMOL/L — SIGNIFICANT CHANGE UP
BASE EXCESS BLDV CALC-SCNC: -2 MMOL/L — SIGNIFICANT CHANGE UP (ref -2–2)
BASE EXCESS BLDV CALC-SCNC: 5.7 MMOL/L — HIGH (ref -2–2)
BASOPHILS # BLD AUTO: 0 K/UL — SIGNIFICANT CHANGE UP (ref 0–0.2)
BASOPHILS NFR BLD AUTO: 0.9 % — SIGNIFICANT CHANGE UP (ref 0–2)
BUN SERPL-MCNC: 12 MG/DL — SIGNIFICANT CHANGE UP (ref 7–23)
BUN SERPL-MCNC: 18 MG/DL — SIGNIFICANT CHANGE UP (ref 7–23)
BUN SERPL-MCNC: 26 MG/DL — HIGH (ref 7–23)
BUN SERPL-MCNC: 34 MG/DL — HIGH (ref 7–23)
BUN SERPL-MCNC: 63 MG/DL — HIGH (ref 7–23)
BUN SERPL-MCNC: 65 MG/DL — HIGH (ref 7–23)
CA-I SERPL-SCNC: 0.93 MMOL/L — LOW (ref 1.12–1.3)
CA-I SERPL-SCNC: 0.98 MMOL/L — LOW (ref 1.12–1.3)
CALCIUM SERPL-MCNC: 7.1 MG/DL — LOW (ref 8.4–10.5)
CALCIUM SERPL-MCNC: 7.2 MG/DL — LOW (ref 8.4–10.5)
CALCIUM SERPL-MCNC: 7.4 MG/DL — LOW (ref 8.4–10.5)
CALCIUM SERPL-MCNC: 7.6 MG/DL — LOW (ref 8.4–10.5)
CALCIUM SERPL-MCNC: 7.6 MG/DL — LOW (ref 8.4–10.5)
CHLORIDE BLDV-SCNC: 95 MMOL/L — LOW (ref 96–108)
CHLORIDE BLDV-SCNC: 98 MMOL/L — SIGNIFICANT CHANGE UP (ref 96–108)
CHLORIDE SERPL-SCNC: 89 MMOL/L — LOW (ref 96–108)
CHLORIDE SERPL-SCNC: 90 MMOL/L — LOW (ref 96–108)
CHLORIDE SERPL-SCNC: 91 MMOL/L — LOW (ref 96–108)
CHLORIDE SERPL-SCNC: 92 MMOL/L — LOW (ref 96–108)
CHLORIDE SERPL-SCNC: 95 MMOL/L — LOW (ref 96–108)
CO2 BLDV-SCNC: 26 MMOL/L — SIGNIFICANT CHANGE UP (ref 22–30)
CO2 BLDV-SCNC: 32 MMOL/L — HIGH (ref 22–30)
CO2 SERPL-SCNC: 19 MMOL/L — LOW (ref 22–31)
CO2 SERPL-SCNC: 21 MMOL/L — LOW (ref 22–31)
CO2 SERPL-SCNC: 24 MMOL/L — SIGNIFICANT CHANGE UP (ref 22–31)
CO2 SERPL-SCNC: 26 MMOL/L — SIGNIFICANT CHANGE UP (ref 22–31)
CO2 SERPL-SCNC: 30 MMOL/L — SIGNIFICANT CHANGE UP (ref 22–31)
CREAT SERPL-MCNC: 3.9 MG/DL — HIGH (ref 0.5–1.3)
CREAT SERPL-MCNC: 4.44 MG/DL — HIGH (ref 0.5–1.3)
CREAT SERPL-MCNC: 5.4 MG/DL — HIGH (ref 0.5–1.3)
CREAT SERPL-MCNC: 8.32 MG/DL — HIGH (ref 0.5–1.3)
CREAT SERPL-MCNC: 8.78 MG/DL — HIGH (ref 0.5–1.3)
EOSINOPHIL # BLD AUTO: 0.1 K/UL — SIGNIFICANT CHANGE UP (ref 0–0.5)
EOSINOPHIL NFR BLD AUTO: 1.8 % — SIGNIFICANT CHANGE UP (ref 0–6)
GAS PNL BLDV: 129 MMOL/L — LOW (ref 136–145)
GAS PNL BLDV: 130 MMOL/L — LOW (ref 136–145)
GAS PNL BLDV: SIGNIFICANT CHANGE UP
GGT SERPL-CCNC: 12 U/L — SIGNIFICANT CHANGE UP (ref 8–40)
GLUCOSE BLDC GLUCOMTR-MCNC: 145 MG/DL — HIGH (ref 70–99)
GLUCOSE BLDC GLUCOMTR-MCNC: 147 MG/DL — HIGH (ref 70–99)
GLUCOSE BLDC GLUCOMTR-MCNC: 147 MG/DL — HIGH (ref 70–99)
GLUCOSE BLDC GLUCOMTR-MCNC: 154 MG/DL — HIGH (ref 70–99)
GLUCOSE BLDC GLUCOMTR-MCNC: 173 MG/DL — HIGH (ref 70–99)
GLUCOSE BLDC GLUCOMTR-MCNC: 173 MG/DL — HIGH (ref 70–99)
GLUCOSE BLDC GLUCOMTR-MCNC: 188 MG/DL — HIGH (ref 70–99)
GLUCOSE BLDC GLUCOMTR-MCNC: 209 MG/DL — HIGH (ref 70–99)
GLUCOSE BLDC GLUCOMTR-MCNC: 229 MG/DL — HIGH (ref 70–99)
GLUCOSE BLDC GLUCOMTR-MCNC: 239 MG/DL — HIGH (ref 70–99)
GLUCOSE BLDC GLUCOMTR-MCNC: 249 MG/DL — HIGH (ref 70–99)
GLUCOSE BLDC GLUCOMTR-MCNC: 259 MG/DL — HIGH (ref 70–99)
GLUCOSE BLDC GLUCOMTR-MCNC: 260 MG/DL — HIGH (ref 70–99)
GLUCOSE BLDC GLUCOMTR-MCNC: 281 MG/DL — HIGH (ref 70–99)
GLUCOSE BLDC GLUCOMTR-MCNC: 300 MG/DL — HIGH (ref 70–99)
GLUCOSE BLDC GLUCOMTR-MCNC: 309 MG/DL — HIGH (ref 70–99)
GLUCOSE BLDC GLUCOMTR-MCNC: 366 MG/DL — HIGH (ref 70–99)
GLUCOSE BLDC GLUCOMTR-MCNC: 389 MG/DL — HIGH (ref 70–99)
GLUCOSE BLDC GLUCOMTR-MCNC: 416 MG/DL — HIGH (ref 70–99)
GLUCOSE BLDC GLUCOMTR-MCNC: 425 MG/DL — HIGH (ref 70–99)
GLUCOSE BLDC GLUCOMTR-MCNC: 428 MG/DL — HIGH (ref 70–99)
GLUCOSE BLDC GLUCOMTR-MCNC: 461 MG/DL — CRITICAL HIGH (ref 70–99)
GLUCOSE BLDC GLUCOMTR-MCNC: 496 MG/DL — CRITICAL HIGH (ref 70–99)
GLUCOSE BLDC GLUCOMTR-MCNC: 500 MG/DL — CRITICAL HIGH (ref 70–99)
GLUCOSE BLDV-MCNC: 228 MG/DL — HIGH (ref 70–99)
GLUCOSE BLDV-MCNC: 389 MG/DL — HIGH (ref 70–99)
GLUCOSE SERPL-MCNC: 161 MG/DL — HIGH (ref 70–99)
GLUCOSE SERPL-MCNC: 237 MG/DL — HIGH (ref 70–99)
GLUCOSE SERPL-MCNC: 291 MG/DL — HIGH (ref 70–99)
GLUCOSE SERPL-MCNC: 414 MG/DL — HIGH (ref 70–99)
GLUCOSE SERPL-MCNC: 523 MG/DL — CRITICAL HIGH (ref 70–99)
HAV IGM SER-ACNC: SIGNIFICANT CHANGE UP
HBA1C BLD-MCNC: 7.2 % — HIGH (ref 4–5.6)
HBV CORE AB SER-ACNC: SIGNIFICANT CHANGE UP
HBV CORE IGM SER-ACNC: SIGNIFICANT CHANGE UP
HBV SURFACE AB SER-ACNC: <3 MIU/ML — LOW
HBV SURFACE AG SER-ACNC: SIGNIFICANT CHANGE UP
HCO3 BLDV-SCNC: 24 MMOL/L — SIGNIFICANT CHANGE UP (ref 21–29)
HCO3 BLDV-SCNC: 30 MMOL/L — HIGH (ref 21–29)
HCT VFR BLD CALC: 38 % — SIGNIFICANT CHANGE UP (ref 34.5–45)
HCT VFR BLDA CALC: 36 % — LOW (ref 39–50)
HCT VFR BLDA CALC: 38 % — LOW (ref 39–50)
HCV AB S/CO SERPL IA: 0.16 S/CO — SIGNIFICANT CHANGE UP
HCV AB SERPL-IMP: SIGNIFICANT CHANGE UP
HGB BLD CALC-MCNC: 11.9 G/DL — SIGNIFICANT CHANGE UP (ref 11.5–15.5)
HGB BLD CALC-MCNC: 12.2 G/DL — SIGNIFICANT CHANGE UP (ref 11.5–15.5)
HGB BLD-MCNC: 12.5 G/DL — SIGNIFICANT CHANGE UP (ref 11.5–15.5)
HOROWITZ INDEX BLDV+IHG-RTO: 28 — SIGNIFICANT CHANGE UP
HOROWITZ INDEX BLDV+IHG-RTO: 30 — SIGNIFICANT CHANGE UP
INR BLD: 1 RATIO — SIGNIFICANT CHANGE UP (ref 0.88–1.16)
LACTATE BLDV-MCNC: 1.1 MMOL/L — SIGNIFICANT CHANGE UP (ref 0.7–2)
LACTATE BLDV-MCNC: 1.6 MMOL/L — SIGNIFICANT CHANGE UP (ref 0.7–2)
LYMPHOCYTES # BLD AUTO: 1.1 K/UL — SIGNIFICANT CHANGE UP (ref 1–3.3)
LYMPHOCYTES # BLD AUTO: 22.7 % — SIGNIFICANT CHANGE UP (ref 13–44)
MAGNESIUM SERPL-MCNC: 2.2 MG/DL — SIGNIFICANT CHANGE UP (ref 1.6–2.6)
MAGNESIUM SERPL-MCNC: 2.3 MG/DL — SIGNIFICANT CHANGE UP (ref 1.6–2.6)
MAGNESIUM SERPL-MCNC: 2.4 MG/DL — SIGNIFICANT CHANGE UP (ref 1.6–2.6)
MAGNESIUM SERPL-MCNC: 2.5 MG/DL — SIGNIFICANT CHANGE UP (ref 1.6–2.6)
MCHC RBC-ENTMCNC: 33 GM/DL — SIGNIFICANT CHANGE UP (ref 32–36)
MCHC RBC-ENTMCNC: 33 PG — SIGNIFICANT CHANGE UP (ref 27–34)
MCV RBC AUTO: 100 FL — SIGNIFICANT CHANGE UP (ref 80–100)
MONOCYTES # BLD AUTO: 0.6 K/UL — SIGNIFICANT CHANGE UP (ref 0–0.9)
MONOCYTES NFR BLD AUTO: 11.1 % — SIGNIFICANT CHANGE UP (ref 2–14)
NEUTROPHILS # BLD AUTO: 3.2 K/UL — SIGNIFICANT CHANGE UP (ref 1.8–7.4)
NEUTROPHILS NFR BLD AUTO: 63.5 % — SIGNIFICANT CHANGE UP (ref 43–77)
OTHER CELLS CSF MANUAL: 14 ML/DL — LOW (ref 18–22)
OTHER CELLS CSF MANUAL: 9 ML/DL — LOW (ref 18–22)
PCO2 BLDV: 47 MMHG — SIGNIFICANT CHANGE UP (ref 35–50)
PCO2 BLDV: 49 MMHG — SIGNIFICANT CHANGE UP (ref 35–50)
PH BLDV: 7.31 — LOW (ref 7.35–7.45)
PH BLDV: 7.42 — SIGNIFICANT CHANGE UP (ref 7.35–7.45)
PHOSPHATE SERPL-MCNC: 4.3 MG/DL — SIGNIFICANT CHANGE UP (ref 2.5–4.5)
PHOSPHATE SERPL-MCNC: 6.1 MG/DL — HIGH (ref 2.5–4.5)
PHOSPHATE SERPL-MCNC: 8.5 MG/DL — HIGH (ref 2.5–4.5)
PHOSPHATE SERPL-MCNC: 8.6 MG/DL — HIGH (ref 2.5–4.5)
PLATELET # BLD AUTO: 270 K/UL — SIGNIFICANT CHANGE UP (ref 150–400)
PO2 BLDV: 36 MMHG — SIGNIFICANT CHANGE UP (ref 25–45)
PO2 BLDV: 58 MMHG — HIGH (ref 25–45)
POTASSIUM BLDV-SCNC: 4.6 MMOL/L — SIGNIFICANT CHANGE UP (ref 3.5–5.3)
POTASSIUM BLDV-SCNC: 5.9 MMOL/L — HIGH (ref 3.5–5.3)
POTASSIUM SERPL-MCNC: 4.9 MMOL/L — SIGNIFICANT CHANGE UP (ref 3.5–5.3)
POTASSIUM SERPL-MCNC: 5.3 MMOL/L — SIGNIFICANT CHANGE UP (ref 3.5–5.3)
POTASSIUM SERPL-MCNC: 5.5 MMOL/L — HIGH (ref 3.5–5.3)
POTASSIUM SERPL-MCNC: 6.1 MMOL/L — HIGH (ref 3.5–5.3)
POTASSIUM SERPL-MCNC: 6.4 MMOL/L — CRITICAL HIGH (ref 3.5–5.3)
POTASSIUM SERPL-SCNC: 4.9 MMOL/L — SIGNIFICANT CHANGE UP (ref 3.5–5.3)
POTASSIUM SERPL-SCNC: 5.3 MMOL/L — SIGNIFICANT CHANGE UP (ref 3.5–5.3)
POTASSIUM SERPL-SCNC: 5.5 MMOL/L — HIGH (ref 3.5–5.3)
POTASSIUM SERPL-SCNC: 6.1 MMOL/L — HIGH (ref 3.5–5.3)
POTASSIUM SERPL-SCNC: 6.4 MMOL/L — CRITICAL HIGH (ref 3.5–5.3)
PROTHROM AB SERPL-ACNC: 10.9 SEC — SIGNIFICANT CHANGE UP (ref 9.8–12.7)
RBC # BLD: 3.8 M/UL — SIGNIFICANT CHANGE UP (ref 3.8–5.2)
RBC # FLD: 14.1 % — SIGNIFICANT CHANGE UP (ref 10.3–14.5)
SAO2 % BLDV: 55 % — LOW (ref 67–88)
SAO2 % BLDV: 88 % — SIGNIFICANT CHANGE UP (ref 67–88)
SODIUM SERPL-SCNC: 131 MMOL/L — LOW (ref 135–145)
SODIUM SERPL-SCNC: 131 MMOL/L — LOW (ref 135–145)
SODIUM SERPL-SCNC: 134 MMOL/L — LOW (ref 135–145)
SODIUM SERPL-SCNC: 135 MMOL/L — SIGNIFICANT CHANGE UP (ref 135–145)
SODIUM SERPL-SCNC: 139 MMOL/L — SIGNIFICANT CHANGE UP (ref 135–145)
TROPONIN T, HIGH SENSITIVITY RESULT: 158 NG/L — HIGH (ref 0–51)
TROPONIN T, HIGH SENSITIVITY RESULT: 167 NG/L — HIGH (ref 0–51)
WBC # BLD: 5 K/UL — SIGNIFICANT CHANGE UP (ref 3.8–10.5)
WBC # FLD AUTO: 5 K/UL — SIGNIFICANT CHANGE UP (ref 3.8–10.5)

## 2018-07-03 PROCEDURE — 99223 1ST HOSP IP/OBS HIGH 75: CPT | Mod: GC

## 2018-07-03 PROCEDURE — 93990 DOPPLER FLOW TESTING: CPT | Mod: 26

## 2018-07-03 PROCEDURE — 99291 CRITICAL CARE FIRST HOUR: CPT

## 2018-07-03 RX ORDER — METOPROLOL TARTRATE 50 MG
50 TABLET ORAL DAILY
Qty: 0 | Refills: 0 | Status: DISCONTINUED | OUTPATIENT
Start: 2018-07-03 | End: 2018-07-03

## 2018-07-03 RX ORDER — INSULIN HUMAN 100 [IU]/ML
1 INJECTION, SOLUTION SUBCUTANEOUS
Qty: 100 | Refills: 0 | Status: DISCONTINUED | OUTPATIENT
Start: 2018-07-03 | End: 2018-07-03

## 2018-07-03 RX ORDER — SODIUM POLYSTYRENE SULFONATE 4.1 MEQ/G
30 POWDER, FOR SUSPENSION ORAL ONCE
Qty: 0 | Refills: 0 | Status: DISCONTINUED | OUTPATIENT
Start: 2018-07-03 | End: 2018-07-03

## 2018-07-03 RX ORDER — INSULIN HUMAN 100 [IU]/ML
2 INJECTION, SOLUTION SUBCUTANEOUS
Qty: 100 | Refills: 0 | Status: DISCONTINUED | OUTPATIENT
Start: 2018-07-03 | End: 2018-07-03

## 2018-07-03 RX ORDER — CLOPIDOGREL BISULFATE 75 MG/1
75 TABLET, FILM COATED ORAL AT BEDTIME
Qty: 0 | Refills: 0 | Status: DISCONTINUED | OUTPATIENT
Start: 2018-07-03 | End: 2018-07-09

## 2018-07-03 RX ORDER — OXYCODONE AND ACETAMINOPHEN 5; 325 MG/1; MG/1
1 TABLET ORAL ONCE
Qty: 0 | Refills: 0 | Status: DISCONTINUED | OUTPATIENT
Start: 2018-07-03 | End: 2018-07-03

## 2018-07-03 RX ORDER — LISINOPRIL 2.5 MG/1
40 TABLET ORAL DAILY
Qty: 0 | Refills: 0 | Status: DISCONTINUED | OUTPATIENT
Start: 2018-07-03 | End: 2018-07-09

## 2018-07-03 RX ORDER — ASPIRIN/CALCIUM CARB/MAGNESIUM 324 MG
81 TABLET ORAL DAILY
Qty: 0 | Refills: 0 | Status: DISCONTINUED | OUTPATIENT
Start: 2018-07-03 | End: 2018-07-09

## 2018-07-03 RX ORDER — ALBUTEROL 90 UG/1
2.5 AEROSOL, METERED ORAL ONCE
Qty: 0 | Refills: 0 | Status: DISCONTINUED | OUTPATIENT
Start: 2018-07-03 | End: 2018-07-03

## 2018-07-03 RX ORDER — METOPROLOL TARTRATE 50 MG
25 TABLET ORAL
Qty: 0 | Refills: 0 | Status: DISCONTINUED | OUTPATIENT
Start: 2018-07-03 | End: 2018-07-09

## 2018-07-03 RX ORDER — ATORVASTATIN CALCIUM 80 MG/1
20 TABLET, FILM COATED ORAL AT BEDTIME
Qty: 0 | Refills: 0 | Status: DISCONTINUED | OUTPATIENT
Start: 2018-07-03 | End: 2018-07-09

## 2018-07-03 RX ORDER — INSULIN HUMAN 100 [IU]/ML
3 INJECTION, SOLUTION SUBCUTANEOUS
Qty: 100 | Refills: 0 | Status: DISCONTINUED | OUTPATIENT
Start: 2018-07-03 | End: 2018-07-03

## 2018-07-03 RX ORDER — INSULIN LISPRO 100/ML
VIAL (ML) SUBCUTANEOUS
Qty: 0 | Refills: 0 | Status: DISCONTINUED | OUTPATIENT
Start: 2018-07-03 | End: 2018-07-04

## 2018-07-03 RX ORDER — DEXTROSE 10 % IN WATER 10 %
1000 INTRAVENOUS SOLUTION INTRAVENOUS
Qty: 0 | Refills: 0 | Status: DISCONTINUED | OUTPATIENT
Start: 2018-07-03 | End: 2018-07-04

## 2018-07-03 RX ORDER — LISINOPRIL 2.5 MG/1
40 TABLET ORAL DAILY
Qty: 0 | Refills: 0 | Status: DISCONTINUED | OUTPATIENT
Start: 2018-07-03 | End: 2018-07-03

## 2018-07-03 RX ORDER — HEPARIN SODIUM 5000 [USP'U]/ML
5000 INJECTION INTRAVENOUS; SUBCUTANEOUS EVERY 12 HOURS
Qty: 0 | Refills: 0 | Status: DISCONTINUED | OUTPATIENT
Start: 2018-07-03 | End: 2018-07-09

## 2018-07-03 RX ADMIN — ATORVASTATIN CALCIUM 20 MILLIGRAM(S): 80 TABLET, FILM COATED ORAL at 22:23

## 2018-07-03 RX ADMIN — CLOPIDOGREL BISULFATE 75 MILLIGRAM(S): 75 TABLET, FILM COATED ORAL at 22:23

## 2018-07-03 RX ADMIN — OXYCODONE AND ACETAMINOPHEN 1 TABLET(S): 5; 325 TABLET ORAL at 02:29

## 2018-07-03 RX ADMIN — INSULIN HUMAN 1 UNIT(S)/HR: 100 INJECTION, SOLUTION SUBCUTANEOUS at 00:33

## 2018-07-03 RX ADMIN — OXYCODONE AND ACETAMINOPHEN 1 TABLET(S): 5; 325 TABLET ORAL at 03:08

## 2018-07-03 RX ADMIN — OXYCODONE AND ACETAMINOPHEN 1 TABLET(S): 5; 325 TABLET ORAL at 19:40

## 2018-07-03 RX ADMIN — Medication 81 MILLIGRAM(S): at 13:27

## 2018-07-03 RX ADMIN — Medication 4: at 18:24

## 2018-07-03 RX ADMIN — OXYCODONE AND ACETAMINOPHEN 1 TABLET(S): 5; 325 TABLET ORAL at 18:35

## 2018-07-03 RX ADMIN — LISINOPRIL 40 MILLIGRAM(S): 2.5 TABLET ORAL at 13:27

## 2018-07-03 RX ADMIN — Medication 25 MILLIGRAM(S): at 10:32

## 2018-07-03 NOTE — H&P ADULT - NSHPLABSRESULTS_GEN_ALL_CORE
12.5   5.0   )-----------( 270      ( 03 Jul 2018 01:44 )             38.0       07-02    134<L>  |  88<L>  |  59<H>  ----------------------------<  294<H>  6.6<HH>   |  19<L>  |  8.1<H>    Anion gap 27  Beta-hydroxybutyrate 1.1  VBG pH 7.30    TPro  7.9  /  Alb  4.4  /  TBili  0.3  /  DBili  x   /  AST  24  /  ALT  8<L>  /  AlkPhos  182<H>  07-02          PT/INR - ( 03 Jul 2018 01:47 )   PT: 10.9 sec;   INR: 1.00 ratio    PTT - ( 03 Jul 2018 01:47 )  PTT:29.3 sec      POCT Blood Glucose.: 366 mg/dL (03 Jul 2018 01:01)

## 2018-07-03 NOTE — H&P ADULT - PMH
ACS (acute coronary syndrome)  1/2015 - cath revealed 100% ostial stenosis not amenable to PCI - medical management  CAD (coronary artery disease)  s/p stents  CHF (congestive heart failure)  EF 40-45%  CVA (cerebral infarction)  with no residual, 8 yrs ago, prior to heart surgery - ST memory loss  Diabetes mellitus type 1  Insulin Dependent - Medtronic  Minimed Paradigm Insulin Pump - Novolog  DKA, type 1  1/2015  ESRD (end stage renal disease)  dialysis  M, tue, thursday, saturday  Gout  past  Hyperlipidemia    Peripheral vascular disease  occluded left fem-pop bypass 5/2015  Subclavian vein stenosis, left  s/p stent  TIA (transient ischemic attack)  x 2 - 8-9 years ago prior to ASD/VSD repair

## 2018-07-03 NOTE — H&P ADULT - ATTENDING COMMENTS
critical care time 40 mins  Patient seen and examined.  Agree with resident note as above.  Patient with history as noted and is well known to the MICU for frequent episodes of DKA and hyperkalemia requiring HD.  Tonight presents with nausea/vomiting/abd pain as is typical for her and again is found to have DKA and hyperkalemia.  Will admit to MICU for insulin gtt and DKA protocol.  Insulin pump will be removed until endo clears patient to place back on.  Urgent HD now to clear hyperkalemia and uremia.  Per renal, based on Cr=8 clearance during HD appears to be suboptimal.  If HD as an inpatient is also suboptimal we may need to assess for stenosis proximal to her fistula.  Full plan as above.

## 2018-07-03 NOTE — H&P ADULT - ASSESSMENT
62y F h/o DM1 (non-adherent w insulin pump) w frequent hospitalizaitons for DKA, ESRD on HD (M/T/Th/Sa, last HD this am), HTN, HLD, CAD s/p __ stents, CVA, PVD. Admitted to MICU for DKA.  #Neuro- AAOx3 (at baseline AAO x2-3). H/o CVA.   #Skin/lines-  #Pulm- no active issues, on room air  #Cardiovasc- HTN, HLD, CAD, HFrEF (40-45%), PVD. Denies chest pain but +RUQ pain. QTc prolonged  -C/w ASA, Plavix  -rpt EKG. avoid meds w QTc prolongation  #GI/  RUQ pain s/p cholecystectomy. RUQ unremarkable   -check ggt, EKG, cardiac enzymes (pt ESRD on HD, dialyzed earlier today)  -NPO  1 ep of reported large quantity bright red blood vaginally at home. Also occured in Mar, gyn was consulted at the time and rec outpt f/u. Currently H&H stable. Pt is post-menopausal, no hx of  cancers. TVUS in ED showed normal ovaries and uterus.  #Renal- ESRD on HD (M/T/Th/Sa), last dialyzed Mon am. Cr 8.1, K 6.6, bicarb 19 (acidemia contributory to electrolyte abnormalities)  - renal consulted for HD    #Metabolic  -HAGMA w metabolic alkalosis (d-d 3.0) 2/2 DKA with diuretic use  -DKA mgmt as below  -K+ 6.4. Multifactorial 2/2 acidemia, ESRD (dialyzed today)  #Endo- admitted for DKA. Glucose 294 > 339, B-hydroxy 1.1, pH 7.30, AG 27. Non-compliant w insulin pump. On previous admissions insulin requirement ~1u/h  - Insulin drip @1u/h. FS q1h, follow DKA protocol  #ID- unknown reason for DKA. Currently no infectious process identified- no leukocytosis/fever/S+S  #DVT ppx- hep sc  #Ethics- full code 62y F h/o DM1 (non-adherent w insulin pump) w frequent hospitalizaitons for DKA, ESRD on HD (M/T/Th/Sa, last HD this am), HTN, HLD, CAD s/p __ stents, CVA, PVD. Admitted to MICU for DKA.  #Neuro- AAOx3 (at baseline AAO x2-3). H/o CVA.   #Skin/lines-  #Pulm- no active issues, on room air  #Cardiovasc- HTN, HLD, CAD, HFrEF (40-45%), PVD. Denies chest pain but +RUQ pain. QTc prolonged  -C/w ASA, Plavix, lipitor, lisinopril, metoprolol  -rpt EKG. avoid meds w QTc prolongation  #GI/  RUQ pain s/p cholecystectomy. RUQ unremarkable   -check ggt, EKG, cardiac enzymes (pt ESRD on HD, dialyzed earlier today)  -NPO  1 ep of reported large quantity bright red blood vaginally at home. Also occured in Mar, gyn was consulted at the time and rec outpt f/u. Currently H&H stable. Pt is post-menopausal, no hx of  cancers. TVUS in ED showed normal ovaries and uterus.  #Renal- ESRD on HD (M/T/Th/Sa), last dialyzed Mon am. Cr 8.1, K 6.6, bicarb 19 (acidemia contributory to electrolyte abnormalities)  - renal consulted for HD    #Metabolic  -HAGMA w metabolic alkalosis (d-d 3.0) 2/2 DKA with diuretic use  -DKA mgmt as below  -K+ 6.4. Multifactorial 2/2 acidemia, ESRD (dialyzed today)  #Endo- admitted for DKA. Glucose 294 > 339, B-hydroxy 1.1, pH 7.30, AG 27. Non-compliant w insulin pump. On previous admissions insulin requirement ~1u/h  - Insulin drip @1u/h. FS q1h, follow DKA protocol  #ID- unknown reason for DKA. Currently no infectious process identified- no leukocytosis/fever/S+S  #DVT ppx- hep sc  #Ethics- full code 62y F h/o DM1 (non-adherent w insulin pump) w frequent hospitalizaitons for DKA, ESRD on HD (M/T/Th/Sa, last HD this am), HTN, HLD, CAD s/p stents, CVA, PVD. Admitted to MICU for DKA and hyperkalemia requiring urgent HD.  #Neuro- AAOx3 (at baseline AAO x2-3). H/o CVA.   #Skin/lines-  #Pulm- no active issues, on room air  #Cardiovasc- HTN, HLD, CAD, HFrEF (40-45%), PVD. Denies chest pain but +RUQ pain. QTc prolonged  -C/w ASA, Plavix, lipitor, lisinopril, metoprolol  -rpt EKG. avoid meds w QTc prolongation; trend cardiac enzymes to r/o occult ischemia  #GI/  RUQ pain s/p cholecystectomy. RUQ unremarkable   -check ggt, EKG, cardiac enzymes (pt ESRD on HD, dialyzed earlier today)  -NPO  1 ep of reported large quantity bright red blood vaginally at home. Also occured in Mar, gyn was consulted at the time and rec outpt f/u. Currently H&H stable. Pt is post-menopausal, no hx of  cancers. TVUS in ED showed normal ovaries and uterus.  #Renal- ESRD on HD (M/T/Th/Sa), last dialyzed Mon am. Cr 8.1, K 6.6, bicarb 19 (acidemia contributory to electrolyte abnormalities)  - renal consulted for HD    #Metabolic  -HAGMA w metabolic alkalosis (d-d 3.0) 2/2 DKA with diuretic use  -DKA mgmt as below  -K+ 6.4. Multifactorial 2/2 acidemia, ESRD (dialyzed today)  #Endo- admitted for DKA. Glucose 294 > 339, B-hydroxy 1.1, pH 7.30, AG 27. Non-compliant w insulin pump. On previous admissions insulin requirement ~1u/h  - Insulin drip @1u/h. FS q1h, follow DKA protocol  #ID- Currently no infectious process identified- no leukocytosis/fever/S+S  #DVT ppx- hep sc  #Ethics- full code 62y F h/o DM1 (non-adherent w insulin pump) w frequent hospitalizaitons for DKA, ESRD on HD (M/T/Th/Sa, last HD this am), HTN, HLD, CAD s/p stents, CVA, PVD. Admitted to MICU for DKA and hyperkalemia requiring urgent HD.  #Neuro- AAOx3 (at baseline AAO x2-3). H/o CVA.   #Skin/lines-  #Pulm- no active issues, on room air  #Cardiovasc- HTN, HLD, CAD, HFrEF (40-45%), PVD. Denies chest pain but +RUQ pain. QTc prolonged  - High sens tpn T 158 -> 167 in the setting of ESRD. EKG unchanged from ED, no ST segment changes. Pt denies CP, SOB. Will get HD this am.  -C/w ASA, Plavix, lipitor, lisinopril, metoprolol  -rpt EKG. avoid meds w QTc prolongation; trend cardiac enzymes to r/o occult ischemia  #GI/  RUQ pain s/p cholecystectomy. RUQ unremarkable   -check ggt, EKG, cardiac enzymes (pt ESRD on HD, dialyzed earlier today)  -NPO  1 ep of reported large quantity bright red blood vaginally at home. Also occured in Mar, gyn was consulted at the time and rec outpt f/u. Currently H&H stable. Pt is post-menopausal, no hx of  cancers. TVUS in ED showed normal ovaries and uterus.  #Renal- ESRD on HD (M/T/Th/Sa), last dialyzed Mon am. Cr 8.1, K 6.6, bicarb 19 (acidemia contributory to electrolyte abnormalities)  - renal consulted for HD    #Metabolic  -HAGMA w metabolic alkalosis (d-d 3.0) 2/2 DKA with diuretic use  -DKA mgmt as below  -K+ 6.4. Multifactorial 2/2 acidemia, ESRD (dialyzed today)  #Endo- admitted for DKA. Glucose 294 > 339, B-hydroxy 1.1, pH 7.30, AG 27. Non-compliant w insulin pump. On previous admissions insulin requirement ~1u/h  - Insulin drip @1u/h. FS q1h, follow DKA protocol  #ID- Currently no infectious process identified- no leukocytosis/fever/S+S  #DVT ppx- hep sc  #Ethics- full code

## 2018-07-03 NOTE — H&P ADULT - NSHPPHYSICALEXAM_GEN_ALL_CORE
General: thin, NAD  Neurology: A&Ox3, nonfocal, CARR x 4  Eyes: PERRLA, EOMI  ENT/Neck: Neck supple, trachea midline, No JVD  Respiratory: CTA B/L, No wheezing, rales, rhonchi  CV: RRR, S1S2, no murmurs, rubs or gallops  Abdominal: Soft, NT, ND +BS   Extremities: No edema, + peripheral pulses  Skin: No Rashes, Hematoma, Ecchymosis

## 2018-07-03 NOTE — CONSULT NOTE ADULT - ATTENDING COMMENTS
Full Consult as per above  She has ESRD and has a left upper brachial-cephalic AV Fistula. She has bleeding with decannulation of the fistula. The fistula is pulsatile and does not have a thrill. An ultrasound study confirmed a stenosis. She will get a angioplasty soon.
Pt seen and examined. Agree with note as above with addendum: f/u with Dr. Pina Ley, [place], in 2 months. Would check another BMP as her bs is now in the 300s. Spent 35 minutes discussing pump care with pt and her , will defer it until pt is stable enough for the floor.  Paris Colbetr MD  [phone]

## 2018-07-03 NOTE — ED ADULT NURSE REASSESSMENT NOTE - NS ED NURSE REASSESS COMMENT FT1
patient's insulin pump removed. Insulin infusion initiated. @ RN's present for medication administration. Patient pending transport to MICU. To be accompanied by RN, JIMI Sweeney MD. patient's insulin pump removed. Insulin infusion initiated. @ RN's present for medication administration. Patient pending transport to MICU. To be accompanied by RN, JIMI Sweeney MD.    Insulin pump paperwork in chart, filled out with prior RN Anushka.

## 2018-07-03 NOTE — PROGRESS NOTE ADULT - SUBJECTIVE AND OBJECTIVE BOX
[facility]   [phone]  DIALYSIS NOTE  Chief Complaint: ESRD/Ongoing hemodialysis requirement.   24 hour events/subjective:      pt with hyperkalemia      ALLERGIES & MEDICATIONS  --------------------------------------------------------------------------------  Allergies    No Known Allergies    Intolerances      Standing Inpatient Medications  aspirin enteric coated 81 milliGRAM(s) Oral daily  atorvastatin 20 milliGRAM(s) Oral at bedtime  clopidogrel Tablet 75 milliGRAM(s) Oral at bedtime  heparin  Injectable 5000 Unit(s) SubCutaneous every 12 hours  insulin Infusion 1 Unit(s)/Hr IV Continuous <Continuous>  lisinopril 40 milliGRAM(s) Oral daily  metoprolol tartrate 25 milliGRAM(s) Oral two times a day    PRN Inpatient Medications      REVIEW OF SYSTEMS  --------------------------------------------------------------------------------  no itching or rash  no fever or chill  no cp or palp   no sob or cough   no N/V/D/ no abd pain   ext no edema no pain         VITALS/PHYSICAL EXAM  --------------------------------------------------------------------------------  T(C): 37.1 (07-03-18 @ 16:00), Max: 37.1 (07-03-18 @ 16:00)  HR: 80 (07-03-18 @ 17:00) (59 - 85)  BP: 165/72 (07-03-18 @ 17:00) (90/52 - 165/72)  RR: 18 (07-03-18 @ 17:00) (10 - 26)  SpO2: 99% (07-03-18 @ 17:00) (96% - 100%)  Wt(kg): --  Height (cm): 160.02 (07-03-18 @ 00:53)  Weight (kg): 51.6 (07-03-18 @ 00:53)  BMI (kg/m2): 20.2 (07-03-18 @ 00:53)  BSA (m2): 1.52 (07-03-18 @ 00:53)      07-02-18 @ 07:01  -  07-03-18 @ 07:00  --------------------------------------------------------  IN: 4.5 mL / OUT: 0 mL / NET: 4.5 mL    07-03-18 @ 07:01  -  07-03-18 @ 17:47  --------------------------------------------------------  IN: 606 mL / OUT: 500 mL / NET: 106 mL      Physical Exam:  		    	Gen: alert oriented place person and date   	Pulm: Decreased breath sounds b/l bases no rales or ronchi  	CV: RRR, S1/S2  	Abd: +BS, soft, nontender/nondistended  	Extremity: No cyanosis, no edema no clubbing  	  	    LABS/STUDIES  --------------------------------------------------------------------------------              12.5   5.0   >-----------<  270      [07-03-18 @ 01:44]              38.0     135  |  92  |  18  ----------------------------<  237      [07-03-18 @ 12:21]  4.9   |  30  |  3.90        Ca     7.6     [07-03-18 @ 12:21]      Mg     2.2     [07-03-18 @ 12:21]      Phos  4.3     [07-03-18 @ 12:21]    TPro  7.9  /  Alb  4.4  /  TBili  0.3  /  DBili  x   /  AST  24  /  ALT  8   /  AlkPhos  182  [07-02-18 @ 21:51]    PT/INR: PT 10.9 , INR 1.00       [07-03-18 @ 01:47]  PTT: 29.3       [07-03-18 @ 01:47]              imp/suggest: ESRD      Hemodialysis Prescription:  	Access: avf  	Dialyzer: revaclear 300  	Blood Flow (mL/Min): 400  	Dialysate Flow (mL/Min): 600  	Target UF (Liters): 0.5 liter   	Treatment Time: 3.5 h  	Potassium:  2k  	Calcium: 2.5  	  YOLANDA    Vitamin D     continue with hd   see hd flow sheet  pt with hd within hours before admission and creatinine still 8.8 as well as k > 6   to have avf evaluated   d/w vascular as well as icu team

## 2018-07-03 NOTE — PROGRESS NOTE ADULT - SUBJECTIVE AND OBJECTIVE BOX
INTERVAL HPI/OVERNIGHT EVENTS: Admitted o/n for DKA. Received HD this AM.     SUBJECTIVE: Patient seen and examined at bedside. Denies CP, SOB, abdominal pain.     OBJECTIVE:    VITAL SIGNS:  ICU Vital Signs Last 24 Hrs  T(C): 36.4 (03 Jul 2018 08:30), Max: 36.8 (02 Jul 2018 22:35)  T(F): 97.6 (03 Jul 2018 08:30), Max: 98.3 (02 Jul 2018 22:35)  HR: 71 (03 Jul 2018 11:00) (59 - 85)  BP: 120/56 (03 Jul 2018 11:00) (90/52 - 146/67)  BP(mean): 81 (03 Jul 2018 11:00) (69 - 97)  ABP: --  ABP(mean): --  RR: 11 (03 Jul 2018 11:00) (10 - 26)  SpO2: 100% (03 Jul 2018 11:00) (96% - 100%)        07-02 @ 07:01  -  07-03 @ 07:00  --------------------------------------------------------  IN: 4.5 mL / OUT: 0 mL / NET: 4.5 mL    07-03 @ 07:01  -  07-03 @ 12:14  --------------------------------------------------------  IN: 202 mL / OUT: 500 mL / NET: -298 mL      CAPILLARY BLOOD GLUCOSE      POCT Blood Glucose.: 229 mg/dL (03 Jul 2018 11:27)      PHYSICAL EXAM:    General: NAD  HEENT: NC/AT; PERRL, clear conjunctiva  Neck: supple  Respiratory: CTA b/l  Cardiovascular: +S1/S2; RRR  Abdomen: soft, NT/ND; +BS x4  Extremities: WWP, 2+ peripheral pulses b/l; no LE edema  Skin: normal color and turgor; no rash  Neurological:    MEDICATIONS:  MEDICATIONS  (STANDING):  aspirin enteric coated 81 milliGRAM(s) Oral daily  atorvastatin 20 milliGRAM(s) Oral at bedtime  clopidogrel Tablet 75 milliGRAM(s) Oral at bedtime  heparin  Injectable 5000 Unit(s) SubCutaneous every 12 hours  insulin Infusion 0.5 Unit(s)/Hr (0.5 mL/Hr) IV Continuous <Continuous>  lisinopril 40 milliGRAM(s) Oral daily  metoprolol tartrate 25 milliGRAM(s) Oral two times a day    MEDICATIONS  (PRN):      ALLERGIES:  Allergies    No Known Allergies    Intolerances        LABS:                        12.5   5.0   )-----------( 270      ( 03 Jul 2018 01:44 )             38.0     07-03    x   |  x   |  12  ----------------------------<  x   x    |  x   |  x     Ca    7.1<L>      03 Jul 2018 05:11  Phos  8.6     07-03  Mg     2.5     07-03    TPro  7.9  /  Alb  4.4  /  TBili  0.3  /  DBili  x   /  AST  24  /  ALT  8<L>  /  AlkPhos  182<H>  07-02    PT/INR - ( 03 Jul 2018 01:47 )   PT: 10.9 sec;   INR: 1.00 ratio         PTT - ( 03 Jul 2018 01:47 )  PTT:29.3 sec      RADIOLOGY & ADDITIONAL TESTS: Reviewed. INTERVAL HPI/OVERNIGHT EVENTS: Admitted o/n for DKA. Received HD this AM.     SUBJECTIVE: Patient seen and examined at bedside. Denies CP, SOB, abdominal pain.     OBJECTIVE:  VITAL SIGNS:  ICU Vital Signs Last 24 Hrs  T(C): 36.4 (03 Jul 2018 08:30), Max: 36.8 (02 Jul 2018 22:35)  T(F): 97.6 (03 Jul 2018 08:30), Max: 98.3 (02 Jul 2018 22:35)  HR: 71 (03 Jul 2018 11:00) (59 - 85)  BP: 120/56 (03 Jul 2018 11:00) (90/52 - 146/67)  BP(mean): 81 (03 Jul 2018 11:00) (69 - 97)  ABP: --  ABP(mean): --  RR: 11 (03 Jul 2018 11:00) (10 - 26)  SpO2: 100% (03 Jul 2018 11:00) (96% - 100%)        07-02 @ 07:01  -  07-03 @ 07:00  --------------------------------------------------------  IN: 4.5 mL / OUT: 0 mL / NET: 4.5 mL    07-03 @ 07:01  -  07-03 @ 12:14  --------------------------------------------------------  IN: 202 mL / OUT: 500 mL / NET: -298 mL      CAPILLARY BLOOD GLUCOSE      POCT Blood Glucose.: 229 mg/dL (03 Jul 2018 11:27)      PHYSICAL EXAM:    General: NAD  HEENT: NC/AT; PERRL, clear conjunctiva  Neck: supple  Respiratory: CTA b/l  Cardiovascular: +S1/S2; RRR  Abdomen: soft, NT/ND; +BS x4  Extremities: WWP, 2+ peripheral pulses b/l; no LE edema  Skin: normal color and turgor; no rash  Neurological:    MEDICATIONS:  MEDICATIONS  (STANDING):  aspirin enteric coated 81 milliGRAM(s) Oral daily  atorvastatin 20 milliGRAM(s) Oral at bedtime  clopidogrel Tablet 75 milliGRAM(s) Oral at bedtime  heparin  Injectable 5000 Unit(s) SubCutaneous every 12 hours  insulin Infusion 0.5 Unit(s)/Hr (0.5 mL/Hr) IV Continuous <Continuous>  lisinopril 40 milliGRAM(s) Oral daily  metoprolol tartrate 25 milliGRAM(s) Oral two times a day    MEDICATIONS  (PRN):      ALLERGIES:  Allergies    No Known Allergies    Intolerances        LABS:                        12.5   5.0   )-----------( 270      ( 03 Jul 2018 01:44 )             38.0     07-03    x   |  x   |  12  ----------------------------<  x   x    |  x   |  x     Ca    7.1<L>      03 Jul 2018 05:11  Phos  8.6     07-03  Mg     2.5     07-03    TPro  7.9  /  Alb  4.4  /  TBili  0.3  /  DBili  x   /  AST  24  /  ALT  8<L>  /  AlkPhos  182<H>  07-02    PT/INR - ( 03 Jul 2018 01:47 )   PT: 10.9 sec;   INR: 1.00 ratio         PTT - ( 03 Jul 2018 01:47 )  PTT:29.3 sec      RADIOLOGY & ADDITIONAL TESTS: Reviewed.

## 2018-07-03 NOTE — PROGRESS NOTE ADULT - ASSESSMENT
62y F h/o DM1 (non-adherent w insulin pump) w frequent hospitalizaitons for DKA, ESRD on HD (M/T/Th/Sa, last HD this am), HTN, HLD, CAD s/p stents, CVA, PVD. Admitted to MICU for DKA and hyperkalemia requiring urgent HD.  #Neuro- AAOx3 (at baseline AAO x2-3). H/o CVA.   #Pulm- no active issues, on room air  #Cardiovasc- HTN, HLD, CAD, HFrEF (40-45%), PVD. Denies chest pain but +RUQ pain. QTc prolonged  - High sens tpn T 158 -> 167 in the setting of ESRD, has baseline elev trop per previous admissions. EKG unchanged from ED, no ST segment changes. Pt denies CP, SOB  -C/w ASA, Plavix, lipitor, lisinopril, metoprolol  #GI/  RUQ pain s/p cholecystectomy. RUQ unremarkable   - ggt wnl  -NPO    #Renal- ESRD on HD (M/T/Th/Sa), last dialyzed Mon am. Cr 8.1, K 6.6, bicarb 19 (acidemia contributory to electrolyte abnormalities)  - renal consulted - received HD this AM, vascular consulted given creatinine of 8 despite having     #Metabolic  -HAGMA w metabolic alkalosis (d-d 3.0) 2/2 DKA with diuretic use  -DKA mgmt as below  -K+ 6.4. Multifactorial 2/2 acidemia, ESRD (dialyzed today)  #Endo- admitted for DKA. Glucose downtrending, required juice this morning, B-hydroxy 1.1 ->0.3, pH 7.30, AG 27->22. Non-compliant w insulin pump. On previous admissions insulin requirement ~1u/h  - Insulin drip @1u/h. FS q1h, follow DKA protocol  #ID- Currently no infectious process identified- no leukocytosis/fever/S+S  #DVT ppx- hep sc  #Ethics- full code 62y F h/o DM1 (non-adherent w insulin pump) w frequent hospitalizaitons for DKA, ESRD on HD (M/T/Th/Sa, last HD this am), HTN, HLD, CAD s/p stents, CVA, PVD. Admitted to MICU for DKA and hyperkalemia requiring urgent HD.  #Neuro- AAOx3 (at baseline AAO x2-3). H/o CVA.   #Pulm- no active issues, on room air  #Cardiovasc- HTN, HLD, CAD, HFrEF (40-45%), PVD. Denies chest pain but +RUQ pain. QTc prolonged  - High sens tpn T 158 -> 167 in the setting of ESRD, has baseline elev trop per previous admissions. EKG unchanged from ED, no ST segment changes. Pt denies CP, SOB  -C/w ASA, Plavix, lipitor, lisinopril, metoprolol  #GI/  presented with RUQ pain, s/p cholecystectomy. RUQ unremarkable on exam and pain resolved  - ggt wnl  - CC diet, renal restrictions    #Renal- ESRD on HD (M/T/Th/Sa), last dialyzed Mon am. Cr 8.1, K 6.6, bicarb 19 (acidemia contributory to electrolyte abnormalities)  - renal consulted - received HD this AM, vascular consulted given creatinine of 8 despite having adequate HD for concern for malfunctioning of AVF    #Metabolic  -HAGMA w metabolic alkalosis (d-d 3.0) 2/2 DKA with diuretic use  -DKA mgmt as below  -K+ 6.4. Multifactorial 2/2 acidemia, ESRD (dialyzed today) - now wnl  #Endo- admitted for DKA. Glucose downtrending, required juice this morning, B-hydroxy 1.1 ->0.3, pH 7.30, AG 27->22->13. Non-compliant w insulin pump. On previous admissions insulin requirement ~1u/h  - Insulin drip @.5u/h. FS q1h, follow DKA protocol - now on diet, will discuss with endo regarding transition to basal/bolus vs pump  #ID- Currently no infectious process identified- no leukocytosis/fever/S+S  #DVT ppx- hep sc

## 2018-07-03 NOTE — H&P ADULT - HISTORY OF PRESENT ILLNESS
63yo F h/o DM1 (has insulin pump, non-adherent) w frequent hospitalizations (last 05/2018) for DKA, ESRD on HD ([facility] AVF, M/T/Th/Sa, last HD this morning), CAD s/p  __ ?stents, HTN HLD, CVA, PVD. Presents from home w nausea, anorexia, and abdominal pain starting today. Went to HD this morning, then at home was feeling general malaise. She began to experience diffuse bilat lower abdominal pain as well as RUQ pain.     Today pt also noted 1 ep large quantity of bright red blood per vagina. On previous admission in March pt had similar complaint, was evaluated by GYN who recommended outpt f/u.   In the ED VS: T 98.1 HR 85 /71 RR 20 SpO2 97% RA  Labs notable for K+ 6.6 -> 7.4, bicarb 19, AG 27, Cr 8.13, Glucose 294, B-hydroxy 1.1, pH 7.30  Pt was started on insulin drip in the ED. Nephrology consult obtained for HD.   Pt was admitted to MICU for DKA. 63yo F h/o DM1 (has insulin pump, non-adherent) w frequent hospitalizations (last 05/2018) for DKA, ESRD on HD ([facility] AVF, M/T/Th/Sa, last HD this morning), CAD s/p  stents, HTN HLD, CVA, PVD. Presents from home w nausea, anorexia, and abdominal pain starting today. Went to HD this morning, then at home was feeling general malaise. She began to experience diffuse bilat lower abdominal pain as well as RUQ pain.     Today pt also noted 1 ep large quantity of bright red blood per vagina. On previous admission in March pt had similar complaint, was evaluated by GYN who recommended outpt f/u.   In the ED VS: T 98.1 HR 85 /71 RR 20 SpO2 97% RA  Labs notable for K+ 6.6 -> 7.4, bicarb 19, AG 27, Cr 8.13, Glucose 294, B-hydroxy 1.1, pH 7.30  Pt was started on insulin drip in the ED. Nephrology consult obtained for HD.   Pt was admitted to MICU for DKA.

## 2018-07-03 NOTE — ED ADULT NURSE REASSESSMENT NOTE - NS ED NURSE REASSESS COMMENT FT1
As per MICU resident, who did not leave her name, "Do a finger stick and if it's within normal limits, start insulin infusion at 1u/hr and send her upstairs." Charge RN made aware.

## 2018-07-03 NOTE — ED ADULT NURSE REASSESSMENT NOTE - NS ED NURSE REASSESS COMMENT FT1
ABG ordered. RN attempted and failed to obtain. 2 PA's attempted ABG and failed. As per MD Bruce and SIMONE Villalta, start insulin infusion. RN expressed concern with starting infusion due to hemolyzed labs. KALI Villalta insisting on starting infusion. Charge RN made aware.

## 2018-07-03 NOTE — CONSULT NOTE ADULT - SUBJECTIVE AND OBJECTIVE BOX
VASCULAR SURGERY CONSULT NOTE  --------------------------------------------------------------------------------------------    Patient is a 62y old  Female who presents with a chief complaint of DKA (03 Jul 2018 02:05)    HPI: 63yo F h/o DM1 (has insulin pump, non-adherent) w frequent hospitalizations (last 05/2018) for DKA, ESRD on HD (LUE AVF, M/T/Th/Sa, last HD this morning), CAD s/p  stents, HTN HLD, CVA, PVD. Presents from home w nausea, anorexia, and abdominal pain starting today. Went to HD this morning, then at home was feeling general malaise. She began to experience diffuse bilat lower abdominal pain as well as RUQ pain. Today pt also noted 1 ep large quantity of bright red blood per vagina. On previous admission in March pt had similar complaint, was evaluated by GYN who recommended outpt f/u.   Labs notable for K+ 6.6 -> 7.4, bicarb 19, AG 27, Cr 8.13, Glucose 294, B-hydroxy 1.1, pH 7.30  Pt was started on insulin drip in the ED. Nephrology consult obtained for HD.   Pt was admitted to MICU for DKA.     Patient underwent HD through her LUE AVF this am but was found to have persistently elevated Cr and potassium. Nephrology consulting vascular surgery for evaluation of the fistula. There is concern for outflow stenosis.       Patient denies fevers/chills, denies lightheadedness/dizziness, denies SOB/chest pain, denies nausea/vomiting, denies constipation/diarrhea.      ROS: 10-system review is otherwise negative except HPI above.      PAST MEDICAL & SURGICAL HISTORY:  CHF (congestive heart failure): EF 40-45%  Subclavian vein stenosis, left: s/p stent  DKA, type 1: 1/2015  ACS (acute coronary syndrome): 1/2015 - cath revealed 100% ostial stenosis not amenable to PCI - medical management  TIA (transient ischemic attack): x 2 - 8-9 years ago prior to ASD/VSD repair  CAD (coronary artery disease): s/p stents  Gout: past  CVA (cerebral infarction): with no residual, 8 yrs ago, prior to heart surgery - ST memory loss  Peripheral vascular disease: occluded left fem-pop bypass 5/2015  Diabetes mellitus type 1: Insulin Dependent - [employer]  Minimed Paradigm Insulin Pump - Novolog  ESRD (end stage renal disease): dialysis  M, tue, thursday, saturday  Hyperlipidemia  Status post device closure of ASD: &quot;clamshell&quot;  History of cardiac catheterization: 1/2015 - no intervention  S/P femoral-popliteal bypass surgery: L and R in 2013 with graft; 5/2015 CFA angioplasty left and ileofemoral endarterectomywith vein patch angioplasty of left fem-pop bypass graft  Multiple vascular surgery both leg, left fempop bypass revision 11/2015  AV (arteriovenous fistula): Left AV graft; revision with stent placement 2-3 years ago  S/P cholecystectomy    FAMILY HISTORY:  Family history of smoking  Family history of hypertension  Family history of cancer (Sibling)      ALLERGIES: No Known Allergies    CURRENT MEDICATIONS  MEDICATIONS (STANDING): aspirin enteric coated 81 milliGRAM(s) Oral daily  atorvastatin 20 milliGRAM(s) Oral at bedtime  clopidogrel Tablet 75 milliGRAM(s) Oral at bedtime  heparin  Injectable 5000 Unit(s) SubCutaneous every 12 hours  insulin Infusion 1 Unit(s)/Hr IV Continuous <Continuous>  insulin lispro (HumaLOG) corrective regimen sliding scale   SubCutaneous three times a day before meals  lisinopril 40 milliGRAM(s) Oral daily  metoprolol tartrate 25 milliGRAM(s) Oral two times a day    MEDICATIONS (PRN):  --------------------------------------------------------------------------------------------    Vitals:   T(C): 37.1 (07-03-18 @ 16:00), Max: 37.1 (07-03-18 @ 16:00)  HR: 80 (07-03-18 @ 17:00) (59 - 85)  BP: 165/72 (07-03-18 @ 17:00) (90/52 - 165/72)  RR: 18 (07-03-18 @ 17:00) (10 - 26)  SpO2: 99% (07-03-18 @ 17:00) (96% - 100%)  CAPILLARY BLOOD GLUCOSE      POCT Blood Glucose.: 496 mg/dL (03 Jul 2018 18:07)  POCT Blood Glucose.: 428 mg/dL (03 Jul 2018 17:28)  POCT Blood Glucose.: 425 mg/dL (03 Jul 2018 16:17)  POCT Blood Glucose.: 300 mg/dL (03 Jul 2018 15:15)  POCT Blood Glucose.: 249 mg/dL (03 Jul 2018 14:02)  POCT Blood Glucose.: 209 mg/dL (03 Jul 2018 13:29)  POCT Blood Glucose.: 229 mg/dL (03 Jul 2018 11:27)  POCT Blood Glucose.: 188 mg/dL (03 Jul 2018 10:03)  POCT Blood Glucose.: 154 mg/dL (03 Jul 2018 09:00)  POCT Blood Glucose.: 147 mg/dL (03 Jul 2018 08:06)  POCT Blood Glucose.: 173 mg/dL (03 Jul 2018 06:59)  POCT Blood Glucose.: 173 mg/dL (03 Jul 2018 06:03)  POCT Blood Glucose.: 260 mg/dL (03 Jul 2018 05:02)  POCT Blood Glucose.: 239 mg/dL (03 Jul 2018 04:01)  POCT Blood Glucose.: 281 mg/dL (03 Jul 2018 03:06)  POCT Blood Glucose.: 309 mg/dL (03 Jul 2018 02:01)  POCT Blood Glucose.: 366 mg/dL (03 Jul 2018 01:01)  POCT Blood Glucose.: 389 mg/dL (03 Jul 2018 00:22)      07-02 @ 07:01  -  07-03 @ 07:00  --------------------------------------------------------  IN:    insulin Infusion: 2 mL    insulin Infusion: 2.5 mL  Total IN: 4.5 mL    OUT:  Total OUT: 0 mL    Total NET: 4.5 mL      07-03 @ 07:01  -  07-03 @ 18:19  --------------------------------------------------------  IN:    insulin Infusion: 1 mL    insulin Infusion: 5 mL    Oral Fluid: 600 mL  Total IN: 606 mL    OUT:    Other: 500 mL  Total OUT: 500 mL    Total NET: 106 mL        Height (cm): 160.02 (07-03 @ 00:53)  Weight (kg): 51.6 (07-03 @ 00:53)  BMI (kg/m2): 20.2 (07-03 @ 00:53)  BSA (m2): 1.52 (07-03 @ 00:53)    PHYSICAL EXAM:   General: NAD,  Neck: Soft, supple  Cardio: RRR, nml S1/S2  Resp: Good effort, CTA b/l  Thorax: No chest wall tenderness  GI/Abd: Soft, NT/ND, no rebound/guarding, no masses palpated  Vascular: All 4 extremities warm, pulsatile LUE AVF with thrill. No numbness/weakness of left arm/hand. Palpable L radial and ulnar pulse.   --------------------------------------------------------------------------------------------    LABS  CBC (07-03 @ 01:44)                              12.5                           5.0     )----------------(  270        63.5  % Neutrophils, 22.7  % Lymphocytes, ANC: 3.2                                 38.0    CBC (07-02 @ 20:43)                              13.3                           6.2     )----------------(  312        70.1  % Neutrophils, 18.3  % Lymphocytes, ANC: 4.4                                 44.4      BMP (07-03 @ 12:21)             135     |  92<L>   |  18    		Ca++ --      Ca 7.6<L>             ---------------------------------( 237<H>		Mg 2.2                4.9     |  30      |  3.90<H>			Ph 4.3     BMP (07-03 @ 09:10)             --      |  --      |  12    		Ca++ --      Ca --                 ---------------------------------( --    		Mg --                 --      |  --      |  --    			Ph --        LFTs (07-02 @ 21:51)      TPro 7.9 / Alb 4.4 / TBili 0.3 / DBili -- / AST 24 / ALT 8<L> / AlkPhos 182<H>  LFTs (07-02 @ 20:43)      TPro 8.1 / Alb 4.4 / TBili 0.4 / DBili -- / AST 25 / ALT 8<L> / AlkPhos 184<H>    Coags (07-03 @ 01:47)  aPTT 29.3 / INR 1.00 / PT 10.9  Coags (07-02 @ 20:43)  aPTT 23.2<L> / INR 0.90 / PT 9.8        VBG (07-03 @ 12:15)     7.42 / 47 / 58<H> / 30<H> / 5.7<H> / 88%     Lactate: 1.1  VBG (07-03 @ 01:35)     7.31<L> / 49 / 36 / 24 / -2.0 / 55<L>%     Lactate: 1.6    --------------------------------------------------------------------------------------------

## 2018-07-03 NOTE — CONSULT NOTE ADULT - PROBLEM SELECTOR RECOMMENDATION 9
-Patient with Type I DM on insulin pump. On insulin gtt at 1U/hr. last AG 22.   -keep patient NPO while in DKA.  -Continue with DKA protocol as per MICU.  -Appears to have been hyperglycemic based on FS. Settings as above but may need adjustments. Patient would like to continue to insulin pump as opposed to basal bolus on dc. Will resume pump when DKA resolved. -Patient with Type I DM on insulin pump. On insulin gtt at 0.5U/hr. last AG now 22, now 13 most recently.   -keep patient NPO while in DKA.  -Continue with DKA protocol as per MICU.  -Patient would like to continue to insulin pump as opposed to basal bolus on dc.  -Would restart patient's pump at home setting and bridge 1hr with gtt. -Patient with Type I DM on insulin pump. On insulin gtt at 0.5U/hr. last AG now 22, now 13 most recently.   -keep patient NPO while in DKA.  -Continue with DKA protocol as per MICU. -Patient with Type I DM on insulin pump. On insulin gtt at 0.5U/hr. last AG now 22, now 13 most recently.   -keep patient NPO while in DKA.  -would recheck another BMP for AG with pt hyperglycemic. Then once AG closed would bridge with lantus 9U and humalog 3 TID as well as augmented low dose humalog correction. Hold off on insulin pump for now.  -Continue with DKA protocol as per MICU.

## 2018-07-03 NOTE — CONSULT NOTE ADULT - SUBJECTIVE AND OBJECTIVE BOX
HPI:  63yo F h/o DM1 (has insulin pump, non-adherent) w frequent hospitalizations (last 05/2018) for DKA, ESRD on HD (LUE AVF, M/T/Th/Sa, last HD this morning), CAD s/p  stents, HTN HLD, CVA, PVD. Presents from home w nausea, anorexia, and abdominal pain starting today. Went to HD this morning, then at home was feeling general malaise. She began to experience diffuse bilat lower abdominal pain as well as RUQ pain.     Today pt also noted 1 ep large quantity of bright red blood per vagina. On previous admission in March pt had similar complaint, was evaluated by GYN who recommended outpt f/u.   In the ED VS: T 98.1 HR 85 /71 RR 20 SpO2 97% RA  Labs notable for K+ 6.6 -> 7.4, bicarb 19, AG 27, Cr 8.13, Glucose 294, B-hydroxy 1.1, pH 7.30  Pt was started on insulin drip in the ED. Nephrology consult obtained for HD.   Pt was admitted to MICU for DKA. (03 Jul 2018 02:05)      PAST MEDICAL & SURGICAL HISTORY:  CHF (congestive heart failure): EF 40-45%  Subclavian vein stenosis, left: s/p stent  DKA, type 1: 1/2015  ACS (acute coronary syndrome): 1/2015 - cath revealed 100% ostial stenosis not amenable to PCI - medical management  TIA (transient ischemic attack): x 2 - 8-9 years ago prior to ASD/VSD repair  CAD (coronary artery disease): s/p stents  Gout: past  CVA (cerebral infarction): with no residual, 8 yrs ago, prior to heart surgery - ST memory loss  Peripheral vascular disease: occluded left fem-pop bypass 5/2015  Diabetes mellitus type 1: Insulin Dependent - Medtronic  Minimed Paradigm Insulin Pump - Novolog  ESRD (end stage renal disease): dialysis  M, tue, thursday, saturday  Hyperlipidemia  Status post device closure of ASD: &quot;clamshell&quot;  History of cardiac catheterization: 1/2015 - no intervention  S/P femoral-popliteal bypass surgery: L and R in 2013 with graft; 5/2015 CFA angioplasty left and ileofemoral endarterectomywith vein patch angioplasty of left fem-pop bypass graft  Multiple vascular surgery both leg, left fempop bypass revision 11/2015  AV (arteriovenous fistula): Left AV graft; revision with stent placement 2-3 years ago  S/P cholecystectomy      FAMILY HISTORY:  Family history of smoking  Family history of hypertension  Family history of cancer (Sibling)      Social History:    Outpatient Medications:    MEDICATIONS  (STANDING):  aspirin enteric coated 81 milliGRAM(s) Oral daily  atorvastatin 20 milliGRAM(s) Oral at bedtime  clopidogrel Tablet 75 milliGRAM(s) Oral at bedtime  heparin  Injectable 5000 Unit(s) SubCutaneous every 12 hours  insulin Infusion 0.5 Unit(s)/Hr (0.5 mL/Hr) IV Continuous <Continuous>  lisinopril 40 milliGRAM(s) Oral daily  metoprolol tartrate 25 milliGRAM(s) Oral two times a day    MEDICATIONS  (PRN):      Allergies    No Known Allergies    Intolerances      Review of Systems:  Constitutional: No fever  Eyes: No blurry vision  Neuro: No tremors  HEENT: No pain  Cardiovascular: No chest pain, palpitations  Respiratory: No SOB, no cough  GI: No nausea, vomiting, abdominal pain  : No dysuria  Skin: no rash  Psych: no depression  Endocrine: no polyuria, polydipsia  Hem/lymph: no swelling  Osteoporosis: no fractures    ALL OTHER SYSTEMS REVIEWED AND NEGATIVE    UNABLE TO OBTAIN    PHYSICAL EXAM:  VITALS: T(C): 36.4 (07-03-18 @ 08:30)  T(F): 97.6 (07-03-18 @ 08:30), Max: 98.3 (07-02-18 @ 22:35)  HR: 69 (07-03-18 @ 10:00) (59 - 85)  BP: 140/67 (07-03-18 @ 10:00) (90/52 - 146/67)  RR:  (10 - 26)  SpO2:  (96% - 100%)  Wt(kg): --  GENERAL: NAD, well-groomed, well-developed  EYES: No proptosis, no lid lag, anicteric  HEENT:  Atraumatic, Normocephalic, moist mucous membranes  THYROID: Normal size, no palpable nodules  RESPIRATORY: Clear to auscultation bilaterally; No rales, rhonchi, wheezing  CARDIOVASCULAR: Regular rate and rhythm; No murmurs; no peripheral edema  GI: Soft, nontender, non distended, normal bowel sounds  SKIN: Dry, intact, No rashes or lesions  MUSCULOSKELETAL: Full range of motion, normal strength  NEURO: sensation intact, extraocular movements intact, no tremor  PSYCH: Alert and oriented x 3, normal affect, normal mood  CUSHING'S SIGNS: no striae    POCT Blood Glucose.: 188 mg/dL (07-03-18 @ 10:03)  POCT Blood Glucose.: 154 mg/dL (07-03-18 @ 09:00)  POCT Blood Glucose.: 147 mg/dL (07-03-18 @ 08:06)  POCT Blood Glucose.: 173 mg/dL (07-03-18 @ 06:59)  POCT Blood Glucose.: 173 mg/dL (07-03-18 @ 06:03)  POCT Blood Glucose.: 260 mg/dL (07-03-18 @ 05:02)  POCT Blood Glucose.: 239 mg/dL (07-03-18 @ 04:01)  POCT Blood Glucose.: 281 mg/dL (07-03-18 @ 03:06)  POCT Blood Glucose.: 309 mg/dL (07-03-18 @ 02:01)  POCT Blood Glucose.: 366 mg/dL (07-03-18 @ 01:01)  POCT Blood Glucose.: 389 mg/dL (07-03-18 @ 00:22)                            12.5   5.0   )-----------( 270      ( 03 Jul 2018 01:44 )             38.0       07-03    x   |  x   |  12  ----------------------------<  x   x    |  x   |  x     EGFR if : x   EGFR if non : x     Ca    7.1<L>      07-03  Mg     2.5     07-03  Phos  8.6     07-03    TPro  7.9  /  Alb  4.4  /  TBili  0.3  /  DBili  x   /  AST  24  /  ALT  8<L>  /  AlkPhos  182<H>  07-02      Thyroid Function Tests:      Hemoglobin A1C, Whole Blood: 7.2 % <H> [4.0 - 5.6] (07-03-18 @ 05:01)            Radiology: HPI:  63yo F h/o DM1 (has insulin pump, non-adherent) w frequent hospitalizations (last 05/2018) for DKA, ESRD on HD (LUE AVF, M/T/Th/Sa, last HD this morning), CAD s/p  stents, HTN HLD, CVA, PVD presents from home w nausea, anorexia, and abdominal pain starting today. Went to HD on morning of admission, then at home was feeling general malaise. She began to experience diffuse bilat lower abdominal pain as well as RUQ pain. Otherwise no vomiting, fevers, chest pain, SOB. Patient went to ED and found with hyperkalemia, of 7.4, AG of 27, glucose 294, B-hydroxy 1.1, pH 7.30, bicarb 19. In ED also noted to have vaginal bleeding.    Patient on mini mendtronic 630G. Follows with endocrinologist Dr. Ley. Last changed pump Sunday, and then also 6days prior. Patient reports following diabetic low carb diet. Eggs for breakfast, sandwich for lunch, chicken for dinner. Denies drinking soda, juices. As per pump, FS running 190s-mids 300s.  Her settings are:   Basal 12am 2.75 units/hour 5am 0.375 units/hour   Bolus I:C 12am-12am 1:15 ISF 12am 60 7am 40 6pm 60 Target BS 12am 110-130, 8am 100-120. IOB: 6 hours.      PAST MEDICAL & SURGICAL HISTORY:  CHF (congestive heart failure): EF 40-45%  Subclavian vein stenosis, left: s/p stent  DKA, type 1: 1/2015  ACS (acute coronary syndrome): 1/2015 - cath revealed 100% ostial stenosis not amenable to PCI - medical management  TIA (transient ischemic attack): x 2 - 8-9 years ago prior to ASD/VSD repair  CAD (coronary artery disease): s/p stents  Gout: past  CVA (cerebral infarction): with no residual, 8 yrs ago, prior to heart surgery - ST memory loss  Peripheral vascular disease: occluded left fem-pop bypass 5/2015  Diabetes mellitus type 1: Insulin Dependent - Medtronic  Minimed Paradigm Insulin Pump - Novolog  ESRD (end stage renal disease): dialysis  M, tue, thursday, saturday  Hyperlipidemia  Status post device closure of ASD: &quot;clamshell&quot;  History of cardiac catheterization: 1/2015 - no intervention  S/P femoral-popliteal bypass surgery: L and R in 2013 with graft; 5/2015 CFA angioplasty left and ileofemoral endarterectomy with vein patch angioplasty of left fem-pop bypass graft  Multiple vascular surgery both leg, left fempop bypass revision 11/2015  AV (arteriovenous fistula): Left AV graft; revision with stent placement 2-3 years ago  S/P cholecystectomy      FAMILY HISTORY:  Family history of smoking  Family history of hypertension  Family history of cancer (Sibling)      Social History: Former smoker, quit smoking 15yrs ago. No alcohol use.     Outpatient Medications:    MEDICATIONS  (STANDING):  aspirin enteric coated 81 milliGRAM(s) Oral daily  atorvastatin 20 milliGRAM(s) Oral at bedtime  clopidogrel Tablet 75 milliGRAM(s) Oral at bedtime  heparin  Injectable 5000 Unit(s) SubCutaneous every 12 hours  insulin Infusion 0.5 Unit(s)/Hr (0.5 mL/Hr) IV Continuous <Continuous>  lisinopril 40 milliGRAM(s) Oral daily  metoprolol tartrate 25 milliGRAM(s) Oral two times a day    MEDICATIONS  (PRN):      Allergies    No Known Allergies    Intolerances      Review of Systems:  Constitutional: No fever  Eyes: No blurry vision  Neuro: No tremors  HEENT: No pain  Cardiovascular: No chest pain, palpitations  Respiratory: No SOB, no cough  GI: No nausea, vomiting, abdominal pain  : No dysuria  Skin: no rash  Endocrine: no polyuria, polydipsia  Hem/lymph: no swelling      PHYSICAL EXAM:  VITALS: T(C): 36.4 (07-03-18 @ 08:30)  T(F): 97.6 (07-03-18 @ 08:30), Max: 98.3 (07-02-18 @ 22:35)  HR: 69 (07-03-18 @ 10:00) (59 - 85)  BP: 140/67 (07-03-18 @ 10:00) (90/52 - 146/67)  RR:  (10 - 26)  SpO2:  (96% - 100%)  Wt(kg): --  GENERAL: NAD, well-developed  EYES: No proptosis, no lid lag, anicteric  HEENT:  Atraumatic, Normocephalic, moist mucous membranes  RESPIRATORY: Clear to auscultation bilaterally; No rales, rhonchi, wheezing  CARDIOVASCULAR: Regular rate and rhythm; No murmurs; no peripheral edema  GI: Soft, nontender, non distended, normal bowel sounds  SKIN: Dry, intact, No rashes or lesions. No ulcers of LE.  MUSCULOSKELETAL: Full range of motion, normal strength  PSYCH: Alert and oriented x 3, normal affect, normal mood  CUSHING'S SIGNS: no striae    POCT Blood Glucose.: 188 mg/dL (07-03-18 @ 10:03)  POCT Blood Glucose.: 154 mg/dL (07-03-18 @ 09:00)  POCT Blood Glucose.: 147 mg/dL (07-03-18 @ 08:06)  POCT Blood Glucose.: 173 mg/dL (07-03-18 @ 06:59)  POCT Blood Glucose.: 173 mg/dL (07-03-18 @ 06:03)  POCT Blood Glucose.: 260 mg/dL (07-03-18 @ 05:02)  POCT Blood Glucose.: 239 mg/dL (07-03-18 @ 04:01)  POCT Blood Glucose.: 281 mg/dL (07-03-18 @ 03:06)  POCT Blood Glucose.: 309 mg/dL (07-03-18 @ 02:01)  POCT Blood Glucose.: 366 mg/dL (07-03-18 @ 01:01)  POCT Blood Glucose.: 389 mg/dL (07-03-18 @ 00:22)                            12.5   5.0   )-----------( 270      ( 03 Jul 2018 01:44 )             38.0       07-03    x   |  x   |  12  ----------------------------<  x   x    |  x   |  x     EGFR if : x   EGFR if non : x     Ca    7.1<L>      07-03  Mg     2.5     07-03  Phos  8.6     07-03    TPro  7.9  /  Alb  4.4  /  TBili  0.3  /  DBili  x   /  AST  24  /  ALT  8<L>  /  AlkPhos  182<H>  07-02      Thyroid Function Tests:      Hemoglobin A1C, Whole Blood: 7.2 % <H> [4.0 - 5.6] (07-03-18 @ 05:01)            Radiology: HPI:  61yo F h/o DM1 (has insulin pump, non-adherent) w frequent hospitalizations (last 05/2018) for DKA, ESRD on HD (LUE AVF, M/T/Th/Sa, last HD this morning), CAD s/p  stents, HTN HLD, CVA, PVD presents from home w nausea, anorexia, and abdominal pain starting today. Went to HD on morning of admission, then at home was feeling general malaise. She began to experience diffuse bilat lower abdominal pain as well as RUQ pain. Otherwise no vomiting, fevers, chest pain, SOB. Patient went to ED and found with hyperkalemia, of 7.4, AG of 27, glucose 294, B-hydroxy 1.1, pH 7.30, bicarb 19. In ED also noted to have vaginal bleeding.    Patient on mini mendtronic 630G. Follows with endocrinologist Dr. Ley. Last changed pump Sunday, and then also 6days prior. Patient reports following diabetic low carb diet. Eggs for breakfast, sandwich for lunch, chicken for dinner. Denies drinking soda, juices. As per pump, FS running 190s-mids 300s.  Her settings are:   Basal 12am 2.75 units/hour 5am 0.375 units/hour   Bolus I:C 12am-12am 1:15 ISF 12am 60 7am 40 6pm 60 Target BS 12am 110-130, 8am 100-120. IOB: 6 hours.      PAST MEDICAL & SURGICAL HISTORY:  CHF (congestive heart failure): EF 40-45%  Subclavian vein stenosis, left: s/p stent  DKA, type 1: 1/2015  ACS (acute coronary syndrome): 1/2015 - cath revealed 100% ostial stenosis not amenable to PCI - medical management  TIA (transient ischemic attack): x 2 - 8-9 years ago prior to ASD/VSD repair  CAD (coronary artery disease): s/p stents  Gout: past  CVA (cerebral infarction): with no residual, 8 yrs ago, prior to heart surgery - ST memory loss  Peripheral vascular disease: occluded left fem-pop bypass 5/2015  Diabetes mellitus type 1: Insulin Dependent - Medtronic  Minimed Paradigm Insulin Pump - Novolog  ESRD (end stage renal disease): dialysis  M, tue, thursday, saturday  Hyperlipidemia  Status post device closure of ASD: &quot;clamshell&quot;  History of cardiac catheterization: 1/2015 - no intervention  S/P femoral-popliteal bypass surgery: L and R in 2013 with graft; 5/2015 CFA angioplasty left and ileofemoral endarterectomy with vein patch angioplasty of left fem-pop bypass graft  Multiple vascular surgery both leg, left fempop bypass revision 11/2015  AV (arteriovenous fistula): Left AV graft; revision with stent placement 2-3 years ago  S/P cholecystectomy      FAMILY HISTORY:  Family history of smoking  Family history of hypertension  Family history of cancer (Sibling)      Social History: Former smoker, quit smoking 15yrs ago. No alcohol use.     Outpatient Medications: Humalog per pump    MEDICATIONS  (STANDING):  aspirin enteric coated 81 milliGRAM(s) Oral daily  atorvastatin 20 milliGRAM(s) Oral at bedtime  clopidogrel Tablet 75 milliGRAM(s) Oral at bedtime  heparin  Injectable 5000 Unit(s) SubCutaneous every 12 hours  insulin Infusion 0.5 Unit(s)/Hr (0.5 mL/Hr) IV Continuous <Continuous>  lisinopril 40 milliGRAM(s) Oral daily  metoprolol tartrate 25 milliGRAM(s) Oral two times a day    MEDICATIONS  (PRN):      Allergies  No Known Allergies          Review of Systems:  Constitutional: No fever  Eyes: No blurry vision  Neuro: No tremors  HEENT: No pain  Cardiovascular: No chest pain, palpitations  Respiratory: No SOB, no cough  GI: No nausea, vomiting, abdominal pain  : No dysuria  Skin: no rash  Endocrine: no polyuria, polydipsia  Hem/lymph: no swelling      PHYSICAL EXAM:  VITALS: T(C): 36.4 (07-03-18 @ 08:30)  T(F): 97.6 (07-03-18 @ 08:30), Max: 98.3 (07-02-18 @ 22:35)  HR: 69 (07-03-18 @ 10:00) (59 - 85)  BP: 140/67 (07-03-18 @ 10:00) (90/52 - 146/67)  RR:  (10 - 26)  SpO2:  (96% - 100%)  Wt(kg): --  GENERAL: NAD, well-developed  EYES: No proptosis, no lid lag, anicteric  HEENT:  Atraumatic, Normocephalic, moist mucous membranes  RESPIRATORY: Clear to auscultation bilaterally; No rales, rhonchi, wheezing  CARDIOVASCULAR: Regular rate and rhythm; No murmurs; no peripheral edema  GI: Soft, nontender, non distended, normal bowel sounds  SKIN: Dry, intact, No rashes or lesions. No ulcers of LE.  MUSCULOSKELETAL: Full range of motion, normal strength  PSYCH: Alert and oriented x 3, normal affect, normal mood  CUSHING'S SIGNS: no striae    POCT Blood Glucose.: 188 mg/dL (07-03-18 @ 10:03)  POCT Blood Glucose.: 154 mg/dL (07-03-18 @ 09:00)  POCT Blood Glucose.: 147 mg/dL (07-03-18 @ 08:06)  POCT Blood Glucose.: 173 mg/dL (07-03-18 @ 06:59)  POCT Blood Glucose.: 173 mg/dL (07-03-18 @ 06:03)  POCT Blood Glucose.: 260 mg/dL (07-03-18 @ 05:02)  POCT Blood Glucose.: 239 mg/dL (07-03-18 @ 04:01)  POCT Blood Glucose.: 281 mg/dL (07-03-18 @ 03:06)  POCT Blood Glucose.: 309 mg/dL (07-03-18 @ 02:01)  POCT Blood Glucose.: 366 mg/dL (07-03-18 @ 01:01)  POCT Blood Glucose.: 389 mg/dL (07-03-18 @ 00:22)                            12.5   5.0   )-----------( 270      ( 03 Jul 2018 01:44 )             38.0       07-03        EGFR if : x   EGFR if non : x     Ca    7.1<L>      07-03  Mg     2.5     07-03  Phos  8.6     07-03    TPro  7.9  /  Alb  4.4  /  TBili  0.3  /  DBili  x   /  AST  24  /  ALT  8<L>  /  AlkPhos  182<H>  07-02      Hemoglobin A1C, Whole Blood: 7.2 % <H> [4.0 - 5.6] (07-03-18 @ 05:01)

## 2018-07-03 NOTE — H&P ADULT - NEGATIVE CARDIOVASCULAR SYMPTOMS
no paroxysmal nocturnal dyspnea/no chest pain/no peripheral edema/no orthopnea/no palpitations/no dyspnea on exertion

## 2018-07-04 LAB
ANION GAP SERPL CALC-SCNC: 20 MMOL/L — HIGH (ref 5–17)
ANION GAP SERPL CALC-SCNC: 21 MMOL/L — HIGH (ref 5–17)
ANION GAP SERPL CALC-SCNC: 22 MMOL/L — HIGH (ref 5–17)
ANION GAP SERPL CALC-SCNC: 23 MMOL/L — HIGH (ref 5–17)
B-OH-BUTYR SERPL-SCNC: 0.1 MMOL/L — SIGNIFICANT CHANGE UP
B-OH-BUTYR SERPL-SCNC: 0.6 MMOL/L — HIGH
B-OH-BUTYR SERPL-SCNC: 0.6 MMOL/L — HIGH
B-OH-BUTYR SERPL-SCNC: 1 MMOL/L — HIGH
BASOPHILS # BLD AUTO: 0 K/UL — SIGNIFICANT CHANGE UP (ref 0–0.2)
BASOPHILS NFR BLD AUTO: 0.4 % — SIGNIFICANT CHANGE UP (ref 0–2)
BUN SERPL-MCNC: 27 MG/DL — HIGH (ref 7–23)
BUN SERPL-MCNC: 30 MG/DL — HIGH (ref 7–23)
BUN SERPL-MCNC: 36 MG/DL — HIGH (ref 7–23)
BUN SERPL-MCNC: 40 MG/DL — HIGH (ref 7–23)
BUN SERPL-MCNC: 43 MG/DL — HIGH (ref 7–23)
BUN SERPL-MCNC: 44 MG/DL — HIGH (ref 7–23)
BUN SERPL-MCNC: <4 MG/DL — LOW (ref 7–23)
CALCIUM SERPL-MCNC: 7.3 MG/DL — LOW (ref 8.4–10.5)
CALCIUM SERPL-MCNC: 7.6 MG/DL — LOW (ref 8.4–10.5)
CALCIUM SERPL-MCNC: 7.7 MG/DL — LOW (ref 8.4–10.5)
CALCIUM SERPL-MCNC: 7.7 MG/DL — LOW (ref 8.4–10.5)
CHLORIDE SERPL-SCNC: 87 MMOL/L — LOW (ref 96–108)
CHLORIDE SERPL-SCNC: 93 MMOL/L — LOW (ref 96–108)
CO2 SERPL-SCNC: 20 MMOL/L — LOW (ref 22–31)
CO2 SERPL-SCNC: 20 MMOL/L — LOW (ref 22–31)
CO2 SERPL-SCNC: 21 MMOL/L — LOW (ref 22–31)
CO2 SERPL-SCNC: 23 MMOL/L — SIGNIFICANT CHANGE UP (ref 22–31)
CREAT SERPL-MCNC: 5.22 MG/DL — HIGH (ref 0.5–1.3)
CREAT SERPL-MCNC: 5.81 MG/DL — HIGH (ref 0.5–1.3)
CREAT SERPL-MCNC: 5.95 MG/DL — HIGH (ref 0.5–1.3)
CREAT SERPL-MCNC: 6.46 MG/DL — HIGH (ref 0.5–1.3)
EOSINOPHIL # BLD AUTO: 0.1 K/UL — SIGNIFICANT CHANGE UP (ref 0–0.5)
EOSINOPHIL NFR BLD AUTO: 1.2 % — SIGNIFICANT CHANGE UP (ref 0–6)
GLUCOSE BLDC GLUCOMTR-MCNC: 156 MG/DL — HIGH (ref 70–99)
GLUCOSE BLDC GLUCOMTR-MCNC: 157 MG/DL — HIGH (ref 70–99)
GLUCOSE BLDC GLUCOMTR-MCNC: 164 MG/DL — HIGH (ref 70–99)
GLUCOSE BLDC GLUCOMTR-MCNC: 177 MG/DL — HIGH (ref 70–99)
GLUCOSE BLDC GLUCOMTR-MCNC: 177 MG/DL — HIGH (ref 70–99)
GLUCOSE BLDC GLUCOMTR-MCNC: 181 MG/DL — HIGH (ref 70–99)
GLUCOSE BLDC GLUCOMTR-MCNC: 194 MG/DL — HIGH (ref 70–99)
GLUCOSE BLDC GLUCOMTR-MCNC: 217 MG/DL — HIGH (ref 70–99)
GLUCOSE BLDC GLUCOMTR-MCNC: 231 MG/DL — HIGH (ref 70–99)
GLUCOSE BLDC GLUCOMTR-MCNC: 231 MG/DL — HIGH (ref 70–99)
GLUCOSE BLDC GLUCOMTR-MCNC: 237 MG/DL — HIGH (ref 70–99)
GLUCOSE BLDC GLUCOMTR-MCNC: 238 MG/DL — HIGH (ref 70–99)
GLUCOSE BLDC GLUCOMTR-MCNC: 253 MG/DL — HIGH (ref 70–99)
GLUCOSE BLDC GLUCOMTR-MCNC: 255 MG/DL — HIGH (ref 70–99)
GLUCOSE BLDC GLUCOMTR-MCNC: 310 MG/DL — HIGH (ref 70–99)
GLUCOSE BLDC GLUCOMTR-MCNC: >600 MG/DL — CRITICAL HIGH (ref 70–99)
GLUCOSE BLDC GLUCOMTR-MCNC: >600 MG/DL — CRITICAL HIGH (ref 70–99)
GLUCOSE SERPL-MCNC: 185 MG/DL — HIGH (ref 70–99)
GLUCOSE SERPL-MCNC: 196 MG/DL — HIGH (ref 70–99)
GLUCOSE SERPL-MCNC: 250 MG/DL — HIGH (ref 70–99)
GLUCOSE SERPL-MCNC: 699 MG/DL — CRITICAL HIGH (ref 70–99)
HCT VFR BLD CALC: 36.9 % — SIGNIFICANT CHANGE UP (ref 34.5–45)
HGB BLD-MCNC: 11.7 G/DL — SIGNIFICANT CHANGE UP (ref 11.5–15.5)
LYMPHOCYTES # BLD AUTO: 1.3 K/UL — SIGNIFICANT CHANGE UP (ref 1–3.3)
LYMPHOCYTES # BLD AUTO: 13.7 % — SIGNIFICANT CHANGE UP (ref 13–44)
MAGNESIUM SERPL-MCNC: 2.2 MG/DL — SIGNIFICANT CHANGE UP (ref 1.6–2.6)
MAGNESIUM SERPL-MCNC: 2.3 MG/DL — SIGNIFICANT CHANGE UP (ref 1.6–2.6)
MCHC RBC-ENTMCNC: 31.8 GM/DL — LOW (ref 32–36)
MCHC RBC-ENTMCNC: 31.8 PG — SIGNIFICANT CHANGE UP (ref 27–34)
MCV RBC AUTO: 100 FL — SIGNIFICANT CHANGE UP (ref 80–100)
MONOCYTES # BLD AUTO: 0.9 K/UL — SIGNIFICANT CHANGE UP (ref 0–0.9)
MONOCYTES NFR BLD AUTO: 9.6 % — SIGNIFICANT CHANGE UP (ref 2–14)
NEUTROPHILS # BLD AUTO: 7.2 K/UL — SIGNIFICANT CHANGE UP (ref 1.8–7.4)
NEUTROPHILS NFR BLD AUTO: 75.1 % — SIGNIFICANT CHANGE UP (ref 43–77)
PHOSPHATE SERPL-MCNC: 6.3 MG/DL — HIGH (ref 2.5–4.5)
PHOSPHATE SERPL-MCNC: 6.5 MG/DL — HIGH (ref 2.5–4.5)
PHOSPHATE SERPL-MCNC: 6.5 MG/DL — HIGH (ref 2.5–4.5)
PHOSPHATE SERPL-MCNC: 6.9 MG/DL — HIGH (ref 2.5–4.5)
PLATELET # BLD AUTO: 280 K/UL — SIGNIFICANT CHANGE UP (ref 150–400)
POTASSIUM SERPL-MCNC: 5.4 MMOL/L — HIGH (ref 3.5–5.3)
POTASSIUM SERPL-MCNC: 5.6 MMOL/L — HIGH (ref 3.5–5.3)
POTASSIUM SERPL-MCNC: 5.7 MMOL/L — HIGH (ref 3.5–5.3)
POTASSIUM SERPL-MCNC: 5.7 MMOL/L — HIGH (ref 3.5–5.3)
POTASSIUM SERPL-SCNC: 5.4 MMOL/L — HIGH (ref 3.5–5.3)
POTASSIUM SERPL-SCNC: 5.6 MMOL/L — HIGH (ref 3.5–5.3)
POTASSIUM SERPL-SCNC: 5.7 MMOL/L — HIGH (ref 3.5–5.3)
POTASSIUM SERPL-SCNC: 5.7 MMOL/L — HIGH (ref 3.5–5.3)
RBC # BLD: 3.68 M/UL — LOW (ref 3.8–5.2)
RBC # FLD: 13.7 % — SIGNIFICANT CHANGE UP (ref 10.3–14.5)
SODIUM SERPL-SCNC: 128 MMOL/L — LOW (ref 135–145)
SODIUM SERPL-SCNC: 135 MMOL/L — SIGNIFICANT CHANGE UP (ref 135–145)
SODIUM SERPL-SCNC: 136 MMOL/L — SIGNIFICANT CHANGE UP (ref 135–145)
SODIUM SERPL-SCNC: 137 MMOL/L — SIGNIFICANT CHANGE UP (ref 135–145)
WBC # BLD: 9.6 K/UL — SIGNIFICANT CHANGE UP (ref 3.8–10.5)
WBC # FLD AUTO: 9.6 K/UL — SIGNIFICANT CHANGE UP (ref 3.8–10.5)

## 2018-07-04 PROCEDURE — 99233 SBSQ HOSP IP/OBS HIGH 50: CPT | Mod: GC

## 2018-07-04 RX ORDER — CYCLOPENTOLATE HYDROCHLORIDE 10 MG/ML
1 SOLUTION/ DROPS OPHTHALMIC DAILY
Qty: 0 | Refills: 0 | Status: DISCONTINUED | OUTPATIENT
Start: 2018-07-04 | End: 2018-07-09

## 2018-07-04 RX ORDER — INSULIN LISPRO 100/ML
3 VIAL (ML) SUBCUTANEOUS
Qty: 0 | Refills: 0 | Status: DISCONTINUED | OUTPATIENT
Start: 2018-07-04 | End: 2018-07-05

## 2018-07-04 RX ORDER — INSULIN HUMAN 100 [IU]/ML
0.5 INJECTION, SOLUTION SUBCUTANEOUS
Qty: 100 | Refills: 0 | Status: DISCONTINUED | OUTPATIENT
Start: 2018-07-04 | End: 2018-07-04

## 2018-07-04 RX ORDER — OXYCODONE AND ACETAMINOPHEN 5; 325 MG/1; MG/1
1 TABLET ORAL ONCE
Qty: 0 | Refills: 0 | Status: DISCONTINUED | OUTPATIENT
Start: 2018-07-04 | End: 2018-07-04

## 2018-07-04 RX ORDER — INSULIN GLARGINE 100 [IU]/ML
9 INJECTION, SOLUTION SUBCUTANEOUS ONCE
Qty: 0 | Refills: 0 | Status: COMPLETED | OUTPATIENT
Start: 2018-07-04 | End: 2018-07-04

## 2018-07-04 RX ORDER — NEOMYCIN/POLYMYXIN B/DEXAMETHA 0.1 %
1 SUSPENSION, DROPS(FINAL DOSAGE FORM)(ML) OPHTHALMIC (EYE) EVERY 8 HOURS
Qty: 0 | Refills: 0 | Status: DISCONTINUED | OUTPATIENT
Start: 2018-07-04 | End: 2018-07-09

## 2018-07-04 RX ORDER — KETOROLAC TROMETHAMINE 0.5 %
1 DROPS OPHTHALMIC (EYE) THREE TIMES A DAY
Qty: 0 | Refills: 0 | Status: DISCONTINUED | OUTPATIENT
Start: 2018-07-04 | End: 2018-07-09

## 2018-07-04 RX ORDER — PREDNISOLONE SODIUM PHOSPHATE 1 %
1 DROPS OPHTHALMIC (EYE) THREE TIMES A DAY
Qty: 0 | Refills: 0 | Status: DISCONTINUED | OUTPATIENT
Start: 2018-07-04 | End: 2018-07-09

## 2018-07-04 RX ORDER — SODIUM CHLORIDE 9 MG/ML
1000 INJECTION INTRAMUSCULAR; INTRAVENOUS; SUBCUTANEOUS
Qty: 0 | Refills: 0 | Status: DISCONTINUED | OUTPATIENT
Start: 2018-07-04 | End: 2018-07-04

## 2018-07-04 RX ORDER — INSULIN GLARGINE 100 [IU]/ML
9 INJECTION, SOLUTION SUBCUTANEOUS
Qty: 0 | Refills: 0 | Status: DISCONTINUED | OUTPATIENT
Start: 2018-07-05 | End: 2018-07-05

## 2018-07-04 RX ADMIN — Medication 25 MILLIGRAM(S): at 00:02

## 2018-07-04 RX ADMIN — Medication 3 UNIT(S): at 16:25

## 2018-07-04 RX ADMIN — Medication 1 DROP(S): at 21:34

## 2018-07-04 RX ADMIN — INSULIN GLARGINE 9 UNIT(S): 100 INJECTION, SOLUTION SUBCUTANEOUS at 16:25

## 2018-07-04 RX ADMIN — Medication 1 DROP(S): at 21:32

## 2018-07-04 RX ADMIN — OXYCODONE AND ACETAMINOPHEN 1 TABLET(S): 5; 325 TABLET ORAL at 01:26

## 2018-07-04 RX ADMIN — OXYCODONE AND ACETAMINOPHEN 1 TABLET(S): 5; 325 TABLET ORAL at 00:10

## 2018-07-04 RX ADMIN — Medication 25 MILLIGRAM(S): at 21:30

## 2018-07-04 RX ADMIN — Medication 1 DROP(S): at 21:41

## 2018-07-04 RX ADMIN — CYCLOPENTOLATE HYDROCHLORIDE 1 DROP(S): 10 SOLUTION/ DROPS OPHTHALMIC at 15:57

## 2018-07-04 RX ADMIN — CLOPIDOGREL BISULFATE 75 MILLIGRAM(S): 75 TABLET, FILM COATED ORAL at 21:30

## 2018-07-04 RX ADMIN — Medication 25 MILLIGRAM(S): at 14:53

## 2018-07-04 RX ADMIN — ATORVASTATIN CALCIUM 20 MILLIGRAM(S): 80 TABLET, FILM COATED ORAL at 21:30

## 2018-07-04 RX ADMIN — LISINOPRIL 40 MILLIGRAM(S): 2.5 TABLET ORAL at 06:07

## 2018-07-04 RX ADMIN — Medication 81 MILLIGRAM(S): at 14:53

## 2018-07-04 NOTE — PROGRESS NOTE ADULT - PROBLEM SELECTOR PLAN 1
- anion gap still elevated on most recent BMP, however BHB normal and glucose 185 on serum BMP  - if no evidence of ketoacidosis on next BMP, can attempt transition to basal bolus insulin - can dose Lantus 8-9 units with Humalog 3 units before meals with 1:100 correction scale qac and qhs  - consistent carb diet when eating  - will follow  - outpatient follow up with Dr. Pina Ley

## 2018-07-04 NOTE — PROGRESS NOTE ADULT - SUBJECTIVE AND OBJECTIVE BOX
INTERVAL HPI/OVERNIGHT EVENTS:    SUBJECTIVE: Patient seen and examined at bedside.     OBJECTIVE:  VITAL SIGNS:  ICU Vital Signs Last 24 Hrs  T(C): 37.1 (04 Jul 2018 04:00), Max: 37.1 (03 Jul 2018 16:00)  T(F): 98.8 (04 Jul 2018 04:00), Max: 98.8 (03 Jul 2018 16:00)  HR: 81 (04 Jul 2018 06:00) (64 - 85)  BP: 117/58 (04 Jul 2018 06:00) (102/58 - 165/72)  BP(mean): 84 (04 Jul 2018 06:00) (74 - 110)  ABP: --  ABP(mean): --  RR: 19 (04 Jul 2018 06:00) (11 - 24)  SpO2: 97% (04 Jul 2018 06:00) (95% - 100%)        07-03 @ 07:01  -  07-04 @ 07:00  --------------------------------------------------------  IN: 831 mL / OUT: 500 mL / NET: 331 mL      CAPILLARY BLOOD GLUCOSE      POCT Blood Glucose.: 237 mg/dL (04 Jul 2018 07:17)      PHYSICAL EXAM:    General: NAD  HEENT: NC/AT; PERRL, clear conjunctiva  Neck: supple  Respiratory: CTA b/l  Cardiovascular: +S1/S2; RRR  Abdomen: soft, NT/ND; +BS x4  Extremities: WWP, 2+ peripheral pulses b/l; no LE edema  Skin: normal color and turgor; no rash  Neurological:    MEDICATIONS:  MEDICATIONS  (STANDING):  aspirin enteric coated 81 milliGRAM(s) Oral daily  atorvastatin 20 milliGRAM(s) Oral at bedtime  clopidogrel Tablet 75 milliGRAM(s) Oral at bedtime  heparin  Injectable 5000 Unit(s) SubCutaneous every 12 hours  insulin Infusion 1 Unit(s)/Hr (1 mL/Hr) IV Continuous <Continuous>  lisinopril 40 milliGRAM(s) Oral daily  metoprolol tartrate 25 milliGRAM(s) Oral two times a day  sodium chloride 0.9%. 1000 milliLiter(s) (30 mL/Hr) IV Continuous <Continuous>    MEDICATIONS  (PRN):      ALLERGIES:  Allergies    No Known Allergies    Intolerances        LABS:                        11.7   9.6   )-----------( 280      ( 04 Jul 2018 02:18 )             36.9     07-04    135  |  93<L>  |  40<H>  ----------------------------<  250<H>  5.7<H>   |  20<L>  |  5.81<H>    Ca    7.6<L>      04 Jul 2018 06:05  Phos  6.3     07-04  Mg     2.2     07-04    TPro  7.9  /  Alb  4.4  /  TBili  0.3  /  DBili  x   /  AST  24  /  ALT  8<L>  /  AlkPhos  182<H>  07-02    PT/INR - ( 03 Jul 2018 01:47 )   PT: 10.9 sec;   INR: 1.00 ratio         PTT - ( 03 Jul 2018 01:47 )  PTT:29.3 sec      RADIOLOGY & ADDITIONAL TESTS: Reviewed.

## 2018-07-04 NOTE — CONSULT NOTE ADULT - SUBJECTIVE AND OBJECTIVE BOX
HPI:  61yo F h/o DM1 (has insulin pump, non-adherent) w frequent hospitalizations (last 05/2018) for DKA, ESRD on HD (LUE AVF, M/T/Th/Sa, last HD this morning), CAD s/p  stents, HTN HLD, CVA, PVD. Presents from home w nausea, anorexia, and abdominal pain starting today. Went to HD this morning, then at home was feeling general malaise. She began to experience diffuse bilat lower abdominal pain as well as RUQ pain.     Today pt also noted 1 ep large quantity of bright red blood per vagina. On previous admission in March pt had similar complaint, was evaluated by GYN who recommended outpt f/u.   In the ED VS: T 98.1 HR 85 /71 RR 20 SpO2 97% RA  Labs notable for K+ 6.6 -> 7.4, bicarb 19, AG 27, Cr 8.13, Glucose 294, B-hydroxy 1.1, pH 7.30  Pt was started on insulin drip in the ED. Nephrology consult obtained for HD.   Pt was admitted to MICU for DKA. (03 Jul 2018 02:05)      PAST MEDICAL & SURGICAL HISTORY:  CHF (congestive heart failure): EF 40-45%  Subclavian vein stenosis, left: s/p stent  DKA, type 1: 1/2015  ACS (acute coronary syndrome): 1/2015 - cath revealed 100% ostial stenosis not amenable to PCI - medical management  TIA (transient ischemic attack): x 2 - 8-9 years ago prior to ASD/VSD repair  CAD (coronary artery disease): s/p stents  Gout: past  CVA (cerebral infarction): with no residual, 8 yrs ago, prior to heart surgery - ST memory loss  Peripheral vascular disease: occluded left fem-pop bypass 5/2015  Diabetes mellitus type 1: Insulin Dependent - [employer]  Minimed Paradigm Insulin Pump - Novolog  ESRD (end stage renal disease): dialysis  M, tue, thursday, saturday  Hyperlipidemia  Status post device closure of ASD: &quot;clamshell&quot;  History of cardiac catheterization: 1/2015 - no intervention  S/P femoral-popliteal bypass surgery: L and R in 2013 with graft; 5/2015 CFA angioplasty left and ileofemoral endarterectomywith vein patch angioplasty of left fem-pop bypass graft  Multiple vascular surgery both leg, left fempop bypass revision 11/2015  AV (arteriovenous fistula): Left AV graft; revision with stent placement 2-3 years ago  S/P cholecystectomy      Review of Systems:   CONSTITUTIONAL: No fever, weight loss, or fatigue  EYES: No eye pain, visual disturbances, or discharge  ENMT:  No difficulty hearing, tinnitus, vertigo; No sinus or throat pain  NECK: No pain or stiffness  BREASTS: No pain, masses, or nipple discharge  RESPIRATORY: No cough, wheezing, chills or hemoptysis; No shortness of breath  CARDIOVASCULAR: No chest pain, palpitations, dizziness, or leg swelling  GASTROINTESTINAL: + abdominal discomfort. No nausea, vomiting, or hematemesis; No diarrhea or constipation. No melena or hematochezia.  GENITOURINARY: No dysuria, frequency, hematuria, or incontinence Vaginal bleeding  NEUROLOGICAL: No headaches, memory loss, loss of strength, numbness, or tremors  SKIN: No itching, burning, rashes, or lesions   LYMPH NODES: No enlarged glands  ENDOCRINE: No heat or cold intolerance; No hair loss  MUSCULOSKELETAL: No joint pain or swelling; No muscle, back, or extremity pain  PSYCHIATRIC: No depression, anxiety, mood swings, or difficulty sleeping  HEME/LYMPH: No easy bruising, or bleeding gums  ALLERY AND IMMUNOLOGIC: No hives or eczema    Allergies    No Known Allergies    Intolerances        Social History:     FAMILY HISTORY:  Family history of smoking  Family history of hypertension  Family history of cancer (Sibling)      MEDICATIONS  (STANDING):  aspirin enteric coated 81 milliGRAM(s) Oral daily  atorvastatin 20 milliGRAM(s) Oral at bedtime  clopidogrel Tablet 75 milliGRAM(s) Oral at bedtime  cyclopentolate 2% Solution 1 Drop(s) Both EYES daily  heparin  Injectable 5000 Unit(s) SubCutaneous every 12 hours  insulin lispro Injectable (HumaLOG) 3 Unit(s) SubCutaneous three times a day before meals  ketorolac 0.5% Ophthalmic Solution 1 Drop(s) Both EYES three times a day  lisinopril 40 milliGRAM(s) Oral daily  metoprolol tartrate 25 milliGRAM(s) Oral two times a day  prednisoLONE acetate 1% Suspension 1 Drop(s) Both EYES three times a day    MEDICATIONS  (PRN):        CAPILLARY BLOOD GLUCOSE      POCT Blood Glucose.: 181 mg/dL (04 Jul 2018 16:20)  POCT Blood Glucose.: 194 mg/dL (04 Jul 2018 13:31)  POCT Blood Glucose.: 157 mg/dL (04 Jul 2018 12:15)  POCT Blood Glucose.: 177 mg/dL (04 Jul 2018 11:23)  POCT Blood Glucose.: 177 mg/dL (04 Jul 2018 10:07)  POCT Blood Glucose.: 217 mg/dL (04 Jul 2018 09:06)  POCT Blood Glucose.: 231 mg/dL (04 Jul 2018 08:13)  POCT Blood Glucose.: 237 mg/dL (04 Jul 2018 07:17)  POCT Blood Glucose.: 238 mg/dL (04 Jul 2018 06:00)  POCT Blood Glucose.: 253 mg/dL (04 Jul 2018 05:02)  POCT Blood Glucose.: 255 mg/dL (04 Jul 2018 04:00)  POCT Blood Glucose.: 310 mg/dL (04 Jul 2018 03:00)  POCT Blood Glucose.: >600 mg/dL (04 Jul 2018 02:05)  POCT Blood Glucose.: >600 mg/dL (04 Jul 2018 02:04)  POCT Blood Glucose.: 231 mg/dL (04 Jul 2018 01:04)  POCT Blood Glucose.: 164 mg/dL (04 Jul 2018 00:01)  POCT Blood Glucose.: 147 mg/dL (03 Jul 2018 23:05)  POCT Blood Glucose.: 145 mg/dL (03 Jul 2018 22:03)  POCT Blood Glucose.: 259 mg/dL (03 Jul 2018 21:04)  POCT Blood Glucose.: 416 mg/dL (03 Jul 2018 19:59)    I&O's Summary    03 Jul 2018 07:01  -  04 Jul 2018 07:00  --------------------------------------------------------  IN: 831 mL / OUT: 500 mL / NET: 331 mL    04 Jul 2018 07:01  -  04 Jul 2018 19:58  --------------------------------------------------------  IN: 698 mL / OUT: 0 mL / NET: 698 mL        PHYSICAL EXAM:  GENERAL: NAD, well-developed  HEAD:  Atraumatic, Normocephalic  EYES: EOMI, PERRLA, conjunctiva and sclera clear  NECK: Supple, No JVD  CHEST/LUNG: Clear to auscultation bilaterally; No wheeze  HEART: Regular rate and rhythm; No murmurs, rubs, or gallops  ABDOMEN: Soft, Nontender, Nondistended; Bowel sounds present  EXTREMITIES:  2+ Peripheral Pulses, No clubbing, cyanosis, or edema  PSYCH: AAOx3  NEUROLOGY: non-focal  SKIN: No rashes or lesions    LABS:                        11.7   9.6   )-----------( 280      ( 04 Jul 2018 02:18 )             36.9     07-04    x   |  x   |  <4<L>  ----------------------------<  x   x    |  x   |  x     Ca    7.7<L>      04 Jul 2018 14:12  Phos  6.5     07-04  Mg     2.2     07-04    TPro  7.9  /  Alb  4.4  /  TBili  0.3  /  DBili  x   /  AST  24  /  ALT  8<L>  /  AlkPhos  182<H>  07-02    PT/INR - ( 03 Jul 2018 01:47 )   PT: 10.9 sec;   INR: 1.00 ratio         PTT - ( 03 Jul 2018 01:47 )  PTT:29.3 sec          RADIOLOGY & ADDITIONAL TESTS:    Imaging Personally Reviewed:    Consultant(s) Notes Reviewed:      Care Discussed with Consultants/Other Providers:

## 2018-07-04 NOTE — CONSULT NOTE ADULT - ASSESSMENT
62 f with  DKA- continue insulin, endocrine follow  ESRD- HD  AVF reevaluation by vascular svc  CAD stable  Vaginal bleeding- Gyn evaluation  d/w   Further action as per clinical course   Pierce Viera MD pager [phone]
ASSESSMENT: Patient is a 62y old f with     Patient underwent HD through her LUE AVF this am but was found to have persistently elevated Cr and potassium. Nephrology consulting vascular surgery for evaluation of the fistula. There is concern for outflow stenosis.     PLAN:   - F/u LUE vascular ultrasound results.   - Plan for LUE AV fistuloplasty Friday. If patient is going to be discharged, patient can follow-up as an outpatient with Dr. Elizalde to re-schedule procedure.   - Please document medical and cardiac clearance for the operation  - Will pre-op and consent patient  - Patient seen/examined or Plan Discussed with Fellow, Dr. Samuel  - Plan discussed with Attending, Dr. Elizalde
61yo F h/o DM1 (has insulin pump, non-adherent) w frequent hospitalizations (last 05/2018) for DKA, ESRD on HD (LUE AVF, M/T/Th/Sa, last HD this morning), CAD s/p  stents, HTN HLD, CVA, PVD presents from home w nausea, anorexia, and abdominal pain. Found to be in DKA and admitted to MICU.
62 yr old female with PMHx Insulin dependent DM (on insulin pump) with frequent hospitalization for DKA ESRD HTN, HLD, HFrEF (40 to 45%), CAD, MI, s/p PCI RCA PVD s/p Lt and Rt fem pop bypasses, subclavian vein stenosis s/p stent, CVA. who now presents with c/o abd pain with no diarrhea. with vaginal bleed.  In ER  noticed with hyperglycemia. also with hyperkalemia with all specimen so far revealing hemolysis. pt did receive hd 7/2 for 2.5 her 4th treatment of the week.     1- esrd  2- hyperkalemia  3- DKA mild thus leading to high AGAP  4- shpt     repeat k if still elevated then hd   hd consent obtained witnessed and placed in chart   hd in am unless repeat k remains elevated  qrs is narrow   insulin drip and DKA control   also to have clearances [ URR}  with next hd   d/w icu team and er team

## 2018-07-04 NOTE — PROGRESS NOTE ADULT - ASSESSMENT
62y F h/o DM1 (non-adherent w insulin pump) w frequent hospitalizaitons for DKA, ESRD on HD (M/T/Th/Sa, last HD this am), HTN, HLD, CAD s/p stents, CVA, PVD. Admitted to MICU for DKA and hyperkalemia requiring urgent HD.  #Neuro- AAOx3 (at baseline AAO x2-3). H/o CVA.   #Pulm- no active issues, on room air  #Cardiovasc- HTN, HLD, CAD, HFrEF (40-45%), PVD. Denies chest pain but +RUQ pain. QTc prolonged  - High sens tpn T 158 -> 167 in the setting of ESRD, has baseline elev trop per previous admissions. EKG unchanged from ED, no ST segment changes. Pt denies CP, SOB  -C/w ASA, Plavix, lipitor, lisinopril, metoprolol  #GI/  - CC diet, renal restrictions  #Renal- ESRD on HD (M/T/Th/Sa), last dialyzed Tuesday AM  - vascular consulted given creatinine of 8 despite having adequate HD for concern for malfunctioning of AVF - tentatively scheduled for fistulaplasty friday    #Metabolic  -HAGMA w metabolic alkalosis (d-d 3.0) 2/2 DKA with diuretic use  -DKA mgmt as below  -K+ 5.7  #Endo- admitted for DKA. FS very labile. Non-compliant w insulin pump.   - yesterday with inc FS and drip inc to 3u/h for , dropped to 200s and gtt decreased to 1u/h again  - AG back up from 13 to 22 this AM. On previous admissions insulin requirement ~1u/h  - Insulin drip @1u/h. FS q1h, BMP q4 follow DKA protocol - NPO again after opening of gap, started on D10 o/n with FS up to 690, switch to NS and required insulin 4u x1  #ID- Currently no infectious process identified- no leukocytosis/fever/S+S  #DVT ppx- hep sc

## 2018-07-04 NOTE — PROGRESS NOTE ADULT - ASSESSMENT
61yo F h/o DM1 (has insulin pump, non-adherent) w frequent hospitalizations (last 05/2018) for DKA, ESRD on HD (ANA AVF, M/T/Th/Sa, last HD this morning), CAD s/p  stents, HTN HLD, CVA, PVD presents with DKA and admitted to MICU.

## 2018-07-04 NOTE — PROGRESS NOTE ADULT - SUBJECTIVE AND OBJECTIVE BOX
[facility]   [phone]  DIALYSIS NOTE  Chief Complaint: ESRD/Ongoing hemodialysis requirement.    24 hour events/subjective:    states had not changed her insulin pump needle for 5 d PTA   feels better         ALLERGIES & MEDICATIONS  --------------------------------------------------------------------------------  Allergies    No Known Allergies    Intolerances      Standing Inpatient Medications  aspirin enteric coated 81 milliGRAM(s) Oral daily  atorvastatin 20 milliGRAM(s) Oral at bedtime  clopidogrel Tablet 75 milliGRAM(s) Oral at bedtime  heparin  Injectable 5000 Unit(s) SubCutaneous every 12 hours  insulin Infusion 0.5 Unit(s)/Hr IV Continuous <Continuous>  lisinopril 40 milliGRAM(s) Oral daily  metoprolol tartrate 25 milliGRAM(s) Oral two times a day  sodium chloride 0.9%. 1000 milliLiter(s) IV Continuous <Continuous>    PRN Inpatient Medications      REVIEW OF SYSTEMS  --------------------------------------------------------------------------------  no itching or rash  no fever or chill  no cp or palp   no sob or cough   no N/V/D/ no abd pain   ext no edema no pain         VITALS/PHYSICAL EXAM  --------------------------------------------------------------------------------  T(C): 36.9 (07-04-18 @ 12:00), Max: 37.1 (07-03-18 @ 16:00)  HR: 72 (07-04-18 @ 12:00) (64 - 85)  BP: 148/62 (07-04-18 @ 12:00) (102/58 - 165/72)  RR: 17 (07-04-18 @ 12:00) (11 - 24)  SpO2: 100% (07-04-18 @ 12:00) (95% - 100%)  Wt(kg): --  Height (cm): 160.02 (07-03-18 @ 00:53)  Weight (kg): 51.6 (07-03-18 @ 00:53)  BMI (kg/m2): 20.2 (07-03-18 @ 00:53)  BSA (m2): 1.52 (07-03-18 @ 00:53)      07-03-18 @ 07:01  -  07-04-18 @ 07:00  --------------------------------------------------------  IN: 831 mL / OUT: 500 mL / NET: 331 mL    07-04-18 @ 07:01  -  07-04-18 @ 13:22  --------------------------------------------------------  IN: 185.5 mL / OUT: 0 mL / NET: 185.5 mL      Physical Exam:  		    	Gen: alert oriented place person and date   	Pulm: Decreased breath sounds b/l bases no rales or ronchi  	CV: RRR, S1/S2  	Abd: +BS, soft, nontender/nondistended  	Extremity: No cyanosis, no edema no clubbing  	Neuro: No focal deficits  	    LABS/STUDIES  --------------------------------------------------------------------------------              11.7   9.6   >-----------<  280      [07-04-18 @ 02:18]              36.9     137  |  93  |  44  ----------------------------<  185      [07-04-18 @ 10:41]  5.7   |  21  |  5.95        Ca     7.7     [07-04-18 @ 10:41]      Mg     2.3     [07-04-18 @ 10:41]      Phos  6.9     [07-04-18 @ 10:41]    TPro  7.9  /  Alb  4.4  /  TBili  0.3  /  DBili  x   /  AST  24  /  ALT  8   /  AlkPhos  182  [07-02-18 @ 21:51]    PT/INR: PT 10.9 , INR 1.00       [07-03-18 @ 01:47]  PTT: 29.3       [07-03-18 @ 01:47]              imp/suggest: ESRD      Hemodialysis Prescription:  	Access: avf  	Dialyzer: revaclear  300  	Blood Flow (mL/Min): 400  	Dialysate Flow (mL/Min): 600  	Target UF (Liters): 1 liter  	Treatment Time:3.5 hr   	Potassium: 2k   	Calcium: 2.5  	  YOLANDA  to have recirculation study with hd   Vitamin D     continue with hd   see hd flow sheet

## 2018-07-04 NOTE — PROGRESS NOTE ADULT - SUBJECTIVE AND OBJECTIVE BOX
Chief Complaint:     History:    MEDICATIONS  (STANDING):  aspirin enteric coated 81 milliGRAM(s) Oral daily  atorvastatin 20 milliGRAM(s) Oral at bedtime  clopidogrel Tablet 75 milliGRAM(s) Oral at bedtime  heparin  Injectable 5000 Unit(s) SubCutaneous every 12 hours  insulin Infusion 1 Unit(s)/Hr (1 mL/Hr) IV Continuous <Continuous>  lisinopril 40 milliGRAM(s) Oral daily  metoprolol tartrate 25 milliGRAM(s) Oral two times a day  sodium chloride 0.9%. 1000 milliLiter(s) (30 mL/Hr) IV Continuous <Continuous>    MEDICATIONS  (PRN):      Allergies    No Known Allergies    Intolerances      Review of Systems:  Constitutional: No fever  Eyes: No blurry vision  Neuro: No tremors  HEENT: No pain  Cardiovascular: No chest pain, palpitations  Respiratory: No SOB, no cough  GI: No nausea, vomiting, abdominal pain  : No dysuria  Skin: no rash  Psych: no depression  Endocrine: no polyuria, polydipsia  Hem/lymph: no swelling  Osteoporosis: no fractures    ALL OTHER SYSTEMS REVIEWED AND NEGATIVE    UNABLE TO OBTAIN    PHYSICAL EXAM:  VITALS: T(C): 37 (07-04-18 @ 08:00)  T(F): 98.6 (07-04-18 @ 08:00), Max: 98.8 (07-03-18 @ 16:00)  HR: 70 (07-04-18 @ 10:00) (64 - 85)  BP: 126/60 (07-04-18 @ 10:00) (102/58 - 165/72)  RR:  (11 - 24)  SpO2:  (95% - 100%)  Wt(kg): --  GENERAL: NAD, well-groomed, well-developed  EYES: No proptosis, no lid lag, anicteric  HEENT:  Atraumatic, Normocephalic, moist mucous membranes  THYROID: Normal size, no palpable nodules  RESPIRATORY: Clear to auscultation bilaterally; No rales, rhonchi, wheezing, or rubs  CARDIOVASCULAR: Regular rate and rhythm; No murmurs; no peripheral edema  GI: Soft, nontender, non distended, normal bowel sounds  SKIN: Dry, intact, No rashes or lesions  MUSCULOSKELETAL: Full range of motion, normal strength  NEURO: sensation intact, extraocular movements intact, no tremor, normal reflexes  PSYCH: Alert and oriented x 3, normal affect, normal mood  CUSHING'S SIGNS: no striae    POCT Blood Glucose.: 177 mg/dL (07-04-18 @ 10:07)  POCT Blood Glucose.: 217 mg/dL (07-04-18 @ 09:06)  POCT Blood Glucose.: 231 mg/dL (07-04-18 @ 08:13)  POCT Blood Glucose.: 237 mg/dL (07-04-18 @ 07:17)  POCT Blood Glucose.: 238 mg/dL (07-04-18 @ 06:00)  POCT Blood Glucose.: 253 mg/dL (07-04-18 @ 05:02)  POCT Blood Glucose.: 255 mg/dL (07-04-18 @ 04:00)  POCT Blood Glucose.: 310 mg/dL (07-04-18 @ 03:00)  POCT Blood Glucose.: >600 mg/dL (07-04-18 @ 02:05)  POCT Blood Glucose.: >600 mg/dL (07-04-18 @ 02:04)  POCT Blood Glucose.: 231 mg/dL (07-04-18 @ 01:04)  POCT Blood Glucose.: 164 mg/dL (07-04-18 @ 00:01)  POCT Blood Glucose.: 147 mg/dL (07-03-18 @ 23:05)  POCT Blood Glucose.: 145 mg/dL (07-03-18 @ 22:03)  POCT Blood Glucose.: 259 mg/dL (07-03-18 @ 21:04)  POCT Blood Glucose.: 416 mg/dL (07-03-18 @ 19:59)  POCT Blood Glucose.: 461 mg/dL (07-03-18 @ 19:11)  POCT Blood Glucose.: 500 mg/dL (07-03-18 @ 19:09)  POCT Blood Glucose.: 496 mg/dL (07-03-18 @ 18:07)  POCT Blood Glucose.: 428 mg/dL (07-03-18 @ 17:28)  POCT Blood Glucose.: 425 mg/dL (07-03-18 @ 16:17)  POCT Blood Glucose.: 300 mg/dL (07-03-18 @ 15:15)  POCT Blood Glucose.: 249 mg/dL (07-03-18 @ 14:02)  POCT Blood Glucose.: 209 mg/dL (07-03-18 @ 13:29)  POCT Blood Glucose.: 229 mg/dL (07-03-18 @ 11:27)  POCT Blood Glucose.: 188 mg/dL (07-03-18 @ 10:03)  POCT Blood Glucose.: 154 mg/dL (07-03-18 @ 09:00)  POCT Blood Glucose.: 147 mg/dL (07-03-18 @ 08:06)  POCT Blood Glucose.: 173 mg/dL (07-03-18 @ 06:59)  POCT Blood Glucose.: 173 mg/dL (07-03-18 @ 06:03)  POCT Blood Glucose.: 260 mg/dL (07-03-18 @ 05:02)  POCT Blood Glucose.: 239 mg/dL (07-03-18 @ 04:01)  POCT Blood Glucose.: 281 mg/dL (07-03-18 @ 03:06)  POCT Blood Glucose.: 309 mg/dL (07-03-18 @ 02:01)  POCT Blood Glucose.: 366 mg/dL (07-03-18 @ 01:01)  POCT Blood Glucose.: 389 mg/dL (07-03-18 @ 00:22)      07-04    135  |  93<L>  |  40<H>  ----------------------------<  250<H>  5.7<H>   |  20<L>  |  5.81<H>    EGFR if : 8<L>  EGFR if non : 7<L>    Ca    7.6<L>      07-04  Mg     2.2     07-04  Phos  6.3     07-04    TPro  7.9  /  Alb  4.4  /  TBili  0.3  /  DBili  x   /  AST  24  /  ALT  8<L>  /  AlkPhos  182<H>  07-02            Hemoglobin A1C, Whole Blood: 7.2 % <H> [4.0 - 5.6] (07-03-18 @ 05:01) Chief Complaint: f/u DM1    History:  Patient seen and examined at bedside. Does not have abdominal pain, nausea, vomiting. She developed worsening hyperglycemia overnight and remains on an insulin gtt. Does not have abdominal pain, nausea, vomiting.     MEDICATIONS  (STANDING):  aspirin enteric coated 81 milliGRAM(s) Oral daily  atorvastatin 20 milliGRAM(s) Oral at bedtime  clopidogrel Tablet 75 milliGRAM(s) Oral at bedtime  heparin  Injectable 5000 Unit(s) SubCutaneous every 12 hours  insulin Infusion 1 Unit(s)/Hr (1 mL/Hr) IV Continuous <Continuous>  lisinopril 40 milliGRAM(s) Oral daily  metoprolol tartrate 25 milliGRAM(s) Oral two times a day  sodium chloride 0.9%. 1000 milliLiter(s) (30 mL/Hr) IV Continuous <Continuous>    MEDICATIONS  (PRN):    Allergies  No Known Allergies    Review of Systems:  Constitutional: No fever  Cardiovascular: No chest pain, palpitations  Respiratory: No SOB, no cough  GI: No nausea, vomiting, abdominal pain    ALL OTHER SYSTEMS REVIEWED AND NEGATIVE    PHYSICAL EXAM:  VITALS: T(C): 37 (07-04-18 @ 08:00)  T(F): 98.6 (07-04-18 @ 08:00), Max: 98.8 (07-03-18 @ 16:00)  HR: 70 (07-04-18 @ 10:00) (64 - 85)  BP: 126/60 (07-04-18 @ 10:00) (102/58 - 165/72)  RR:  (11 - 24)  SpO2:  (95% - 100%)  Wt(kg): --  GENERAL: NAD, well-developed  RESPIRATORY: Clear to auscultation bilaterally; No rales, rhonchi, wheezing, or rubs  CARDIOVASCULAR: Regular rate and rhythm; No murmurs; no peripheral edema  GI: Soft, nontender, non distended    POCT Blood Glucose.: 177 mg/dL (07-04-18 @ 10:07)  POCT Blood Glucose.: 217 mg/dL (07-04-18 @ 09:06)  POCT Blood Glucose.: 231 mg/dL (07-04-18 @ 08:13)  POCT Blood Glucose.: 237 mg/dL (07-04-18 @ 07:17)  POCT Blood Glucose.: 238 mg/dL (07-04-18 @ 06:00)  POCT Blood Glucose.: 253 mg/dL (07-04-18 @ 05:02)  POCT Blood Glucose.: 255 mg/dL (07-04-18 @ 04:00)  POCT Blood Glucose.: 310 mg/dL (07-04-18 @ 03:00)  POCT Blood Glucose.: >600 mg/dL (07-04-18 @ 02:05)  POCT Blood Glucose.: >600 mg/dL (07-04-18 @ 02:04)  POCT Blood Glucose.: 231 mg/dL (07-04-18 @ 01:04)  POCT Blood Glucose.: 164 mg/dL (07-04-18 @ 00:01)  POCT Blood Glucose.: 147 mg/dL (07-03-18 @ 23:05)  POCT Blood Glucose.: 145 mg/dL (07-03-18 @ 22:03)  POCT Blood Glucose.: 259 mg/dL (07-03-18 @ 21:04)  POCT Blood Glucose.: 416 mg/dL (07-03-18 @ 19:59)  POCT Blood Glucose.: 461 mg/dL (07-03-18 @ 19:11)  POCT Blood Glucose.: 500 mg/dL (07-03-18 @ 19:09)  POCT Blood Glucose.: 496 mg/dL (07-03-18 @ 18:07)  POCT Blood Glucose.: 428 mg/dL (07-03-18 @ 17:28)  POCT Blood Glucose.: 425 mg/dL (07-03-18 @ 16:17)  POCT Blood Glucose.: 300 mg/dL (07-03-18 @ 15:15)  POCT Blood Glucose.: 249 mg/dL (07-03-18 @ 14:02)  POCT Blood Glucose.: 209 mg/dL (07-03-18 @ 13:29)  POCT Blood Glucose.: 229 mg/dL (07-03-18 @ 11:27)  POCT Blood Glucose.: 188 mg/dL (07-03-18 @ 10:03)  POCT Blood Glucose.: 154 mg/dL (07-03-18 @ 09:00)  POCT Blood Glucose.: 147 mg/dL (07-03-18 @ 08:06)  POCT Blood Glucose.: 173 mg/dL (07-03-18 @ 06:59)  POCT Blood Glucose.: 173 mg/dL (07-03-18 @ 06:03)  POCT Blood Glucose.: 260 mg/dL (07-03-18 @ 05:02)  POCT Blood Glucose.: 239 mg/dL (07-03-18 @ 04:01)  POCT Blood Glucose.: 281 mg/dL (07-03-18 @ 03:06)  POCT Blood Glucose.: 309 mg/dL (07-03-18 @ 02:01)  POCT Blood Glucose.: 366 mg/dL (07-03-18 @ 01:01)  POCT Blood Glucose.: 389 mg/dL (07-03-18 @ 00:22)      07-04    135  |  93<L>  |  40<H>  ----------------------------<  250<H>  5.7<H>   |  20<L>  |  5.81<H>    EGFR if : 8<L>  EGFR if non : 7<L>    Ca    7.6<L>      07-04  Mg     2.2     07-04  Phos  6.3     07-04    TPro  7.9  /  Alb  4.4  /  TBili  0.3  /  DBili  x   /  AST  24  /  ALT  8<L>  /  AlkPhos  182<H>  07-02            Hemoglobin A1C, Whole Blood: 7.2 % <H> [4.0 - 5.6] (07-03-18 @ 05:01)

## 2018-07-05 LAB
ALBUMIN SERPL ELPH-MCNC: 4.5 G/DL — SIGNIFICANT CHANGE UP (ref 3.3–5)
ALBUMIN SERPL ELPH-MCNC: 4.5 G/DL — SIGNIFICANT CHANGE UP (ref 3.3–5)
ALP SERPL-CCNC: 157 U/L — HIGH (ref 40–120)
ALP SERPL-CCNC: 173 U/L — HIGH (ref 40–120)
ALT FLD-CCNC: 12 U/L — SIGNIFICANT CHANGE UP (ref 10–45)
ALT FLD-CCNC: 7 U/L — LOW (ref 10–45)
ANION GAP SERPL CALC-SCNC: 17 MMOL/L — SIGNIFICANT CHANGE UP (ref 5–17)
ANION GAP SERPL CALC-SCNC: 23 MMOL/L — HIGH (ref 5–17)
AST SERPL-CCNC: 30 U/L — SIGNIFICANT CHANGE UP (ref 10–40)
AST SERPL-CCNC: 9 U/L — LOW (ref 10–40)
B-OH-BUTYR SERPL-SCNC: 1.1 MMOL/L — HIGH
BILIRUB SERPL-MCNC: 0.3 MG/DL — SIGNIFICANT CHANGE UP (ref 0.2–1.2)
BILIRUB SERPL-MCNC: 0.4 MG/DL — SIGNIFICANT CHANGE UP (ref 0.2–1.2)
BUN SERPL-MCNC: 17 MG/DL — SIGNIFICANT CHANGE UP (ref 7–23)
BUN SERPL-MCNC: 27 MG/DL — HIGH (ref 7–23)
CALCIUM SERPL-MCNC: 7.7 MG/DL — LOW (ref 8.4–10.5)
CALCIUM SERPL-MCNC: 8.4 MG/DL — SIGNIFICANT CHANGE UP (ref 8.4–10.5)
CHLORIDE SERPL-SCNC: 89 MMOL/L — LOW (ref 96–108)
CHLORIDE SERPL-SCNC: 91 MMOL/L — LOW (ref 96–108)
CO2 SERPL-SCNC: 22 MMOL/L — SIGNIFICANT CHANGE UP (ref 22–31)
CO2 SERPL-SCNC: 27 MMOL/L — SIGNIFICANT CHANGE UP (ref 22–31)
CREAT SERPL-MCNC: 3.3 MG/DL — HIGH (ref 0.5–1.3)
CREAT SERPL-MCNC: 4.53 MG/DL — HIGH (ref 0.5–1.3)
GLUCOSE BLDC GLUCOMTR-MCNC: 183 MG/DL — HIGH (ref 70–99)
GLUCOSE BLDC GLUCOMTR-MCNC: 221 MG/DL — HIGH (ref 70–99)
GLUCOSE BLDC GLUCOMTR-MCNC: 284 MG/DL — HIGH (ref 70–99)
GLUCOSE BLDC GLUCOMTR-MCNC: 298 MG/DL — HIGH (ref 70–99)
GLUCOSE BLDC GLUCOMTR-MCNC: 321 MG/DL — HIGH (ref 70–99)
GLUCOSE SERPL-MCNC: 314 MG/DL — HIGH (ref 70–99)
GLUCOSE SERPL-MCNC: 319 MG/DL — HIGH (ref 70–99)
HCT VFR BLD CALC: 36.6 % — SIGNIFICANT CHANGE UP (ref 34.5–45)
HGB BLD-MCNC: 13.2 G/DL — SIGNIFICANT CHANGE UP (ref 11.5–15.5)
MAGNESIUM SERPL-MCNC: 2.2 MG/DL — SIGNIFICANT CHANGE UP (ref 1.6–2.6)
MAGNESIUM SERPL-MCNC: 2.2 MG/DL — SIGNIFICANT CHANGE UP (ref 1.6–2.6)
MCHC RBC-ENTMCNC: 35.4 PG — HIGH (ref 27–34)
MCHC RBC-ENTMCNC: 36 GM/DL — SIGNIFICANT CHANGE UP (ref 32–36)
MCV RBC AUTO: 98.4 FL — SIGNIFICANT CHANGE UP (ref 80–100)
PHOSPHATE SERPL-MCNC: 4.9 MG/DL — HIGH (ref 2.5–4.5)
PHOSPHATE SERPL-MCNC: 5.1 MG/DL — HIGH (ref 2.5–4.5)
PLATELET # BLD AUTO: 159 K/UL — SIGNIFICANT CHANGE UP (ref 150–400)
POTASSIUM SERPL-MCNC: 4.9 MMOL/L — SIGNIFICANT CHANGE UP (ref 3.5–5.3)
POTASSIUM SERPL-MCNC: 5.8 MMOL/L — HIGH (ref 3.5–5.3)
POTASSIUM SERPL-SCNC: 4.9 MMOL/L — SIGNIFICANT CHANGE UP (ref 3.5–5.3)
POTASSIUM SERPL-SCNC: 5.8 MMOL/L — HIGH (ref 3.5–5.3)
PROT SERPL-MCNC: 7.6 G/DL — SIGNIFICANT CHANGE UP (ref 6–8.3)
PROT SERPL-MCNC: 8.2 G/DL — SIGNIFICANT CHANGE UP (ref 6–8.3)
RBC # BLD: 3.72 M/UL — LOW (ref 3.8–5.2)
RBC # FLD: 13.7 % — SIGNIFICANT CHANGE UP (ref 10.3–14.5)
SODIUM SERPL-SCNC: 134 MMOL/L — LOW (ref 135–145)
SODIUM SERPL-SCNC: 135 MMOL/L — SIGNIFICANT CHANGE UP (ref 135–145)
WBC # BLD: 6.6 K/UL — SIGNIFICANT CHANGE UP (ref 3.8–10.5)
WBC # FLD AUTO: 6.6 K/UL — SIGNIFICANT CHANGE UP (ref 3.8–10.5)

## 2018-07-05 PROCEDURE — 99291 CRITICAL CARE FIRST HOUR: CPT

## 2018-07-05 PROCEDURE — 99232 SBSQ HOSP IP/OBS MODERATE 35: CPT

## 2018-07-05 RX ORDER — SODIUM CHLORIDE 9 MG/ML
1000 INJECTION, SOLUTION INTRAVENOUS
Qty: 0 | Refills: 0 | Status: DISCONTINUED | OUTPATIENT
Start: 2018-07-05 | End: 2018-07-09

## 2018-07-05 RX ORDER — INSULIN LISPRO 100/ML
VIAL (ML) SUBCUTANEOUS
Qty: 0 | Refills: 0 | Status: DISCONTINUED | OUTPATIENT
Start: 2018-07-05 | End: 2018-07-05

## 2018-07-05 RX ORDER — INSULIN LISPRO 100/ML
VIAL (ML) SUBCUTANEOUS AT BEDTIME
Qty: 0 | Refills: 0 | Status: DISCONTINUED | OUTPATIENT
Start: 2018-07-05 | End: 2018-07-09

## 2018-07-05 RX ORDER — INSULIN GLARGINE 100 [IU]/ML
10 INJECTION, SOLUTION SUBCUTANEOUS AT BEDTIME
Qty: 0 | Refills: 0 | Status: DISCONTINUED | OUTPATIENT
Start: 2018-07-05 | End: 2018-07-05

## 2018-07-05 RX ORDER — INSULIN LISPRO 100/ML
1 VIAL (ML) SUBCUTANEOUS
Qty: 0 | Refills: 0 | Status: DISCONTINUED | OUTPATIENT
Start: 2018-07-05 | End: 2018-07-05

## 2018-07-05 RX ORDER — INSULIN LISPRO 100/ML
4 VIAL (ML) SUBCUTANEOUS
Qty: 0 | Refills: 0 | Status: DISCONTINUED | OUTPATIENT
Start: 2018-07-05 | End: 2018-07-05

## 2018-07-05 RX ORDER — DEXTROSE 50 % IN WATER 50 %
25 SYRINGE (ML) INTRAVENOUS ONCE
Qty: 0 | Refills: 0 | Status: DISCONTINUED | OUTPATIENT
Start: 2018-07-05 | End: 2018-07-09

## 2018-07-05 RX ORDER — INSULIN LISPRO 100/ML
4 VIAL (ML) SUBCUTANEOUS ONCE
Qty: 0 | Refills: 0 | Status: COMPLETED | OUTPATIENT
Start: 2018-07-05 | End: 2018-07-05

## 2018-07-05 RX ORDER — INSULIN HUMAN 100 [IU]/ML
0.5 INJECTION, SOLUTION SUBCUTANEOUS
Qty: 100 | Refills: 0 | Status: DISCONTINUED | OUTPATIENT
Start: 2018-07-05 | End: 2018-07-05

## 2018-07-05 RX ORDER — DEXTROSE 50 % IN WATER 50 %
12.5 SYRINGE (ML) INTRAVENOUS ONCE
Qty: 0 | Refills: 0 | Status: DISCONTINUED | OUTPATIENT
Start: 2018-07-05 | End: 2018-07-09

## 2018-07-05 RX ORDER — SODIUM POLYSTYRENE SULFONATE 4.1 MEQ/G
15 POWDER, FOR SUSPENSION ORAL ONCE
Qty: 0 | Refills: 0 | Status: DISCONTINUED | OUTPATIENT
Start: 2018-07-05 | End: 2018-07-05

## 2018-07-05 RX ORDER — GLUCAGON INJECTION, SOLUTION 0.5 MG/.1ML
1 INJECTION, SOLUTION SUBCUTANEOUS ONCE
Qty: 0 | Refills: 0 | Status: DISCONTINUED | OUTPATIENT
Start: 2018-07-05 | End: 2018-07-09

## 2018-07-05 RX ORDER — INSULIN LISPRO 100/ML
VIAL (ML) SUBCUTANEOUS
Qty: 0 | Refills: 0 | Status: DISCONTINUED | OUTPATIENT
Start: 2018-07-05 | End: 2018-07-09

## 2018-07-05 RX ORDER — DEXTROSE 50 % IN WATER 50 %
15 SYRINGE (ML) INTRAVENOUS ONCE
Qty: 0 | Refills: 0 | Status: DISCONTINUED | OUTPATIENT
Start: 2018-07-05 | End: 2018-07-09

## 2018-07-05 RX ORDER — INSULIN GLARGINE 100 [IU]/ML
9 INJECTION, SOLUTION SUBCUTANEOUS ONCE
Qty: 0 | Refills: 0 | Status: COMPLETED | OUTPATIENT
Start: 2018-07-05 | End: 2018-07-05

## 2018-07-05 RX ORDER — INSULIN LISPRO 100/ML
3 VIAL (ML) SUBCUTANEOUS
Qty: 0 | Refills: 0 | Status: DISCONTINUED | OUTPATIENT
Start: 2018-07-05 | End: 2018-07-09

## 2018-07-05 RX ADMIN — Medication 1 DROP(S): at 22:35

## 2018-07-05 RX ADMIN — Medication 1 DROP(S): at 22:33

## 2018-07-05 RX ADMIN — Medication 81 MILLIGRAM(S): at 11:27

## 2018-07-05 RX ADMIN — Medication 25 MILLIGRAM(S): at 09:50

## 2018-07-05 RX ADMIN — INSULIN GLARGINE 9 UNIT(S): 100 INJECTION, SOLUTION SUBCUTANEOUS at 18:56

## 2018-07-05 RX ADMIN — Medication 2: at 13:04

## 2018-07-05 RX ADMIN — Medication 1 DROP(S): at 05:46

## 2018-07-05 RX ADMIN — Medication 1 DROP(S): at 15:08

## 2018-07-05 RX ADMIN — Medication 1 DROP(S): at 05:45

## 2018-07-05 RX ADMIN — CLOPIDOGREL BISULFATE 75 MILLIGRAM(S): 75 TABLET, FILM COATED ORAL at 22:32

## 2018-07-05 RX ADMIN — Medication 1 DROP(S): at 13:09

## 2018-07-05 RX ADMIN — Medication 1 EACH: at 15:08

## 2018-07-05 RX ADMIN — Medication 4 UNIT(S): at 08:55

## 2018-07-05 RX ADMIN — CYCLOPENTOLATE HYDROCHLORIDE 1 DROP(S): 10 SOLUTION/ DROPS OPHTHALMIC at 11:51

## 2018-07-05 RX ADMIN — Medication 4 UNIT(S): at 13:05

## 2018-07-05 RX ADMIN — ATORVASTATIN CALCIUM 20 MILLIGRAM(S): 80 TABLET, FILM COATED ORAL at 22:33

## 2018-07-05 RX ADMIN — Medication 25 MILLIGRAM(S): at 22:33

## 2018-07-05 RX ADMIN — Medication 1 DROP(S): at 13:08

## 2018-07-05 RX ADMIN — Medication 0: at 22:32

## 2018-07-05 RX ADMIN — LISINOPRIL 40 MILLIGRAM(S): 2.5 TABLET ORAL at 05:45

## 2018-07-05 NOTE — PROGRESS NOTE ADULT - SUBJECTIVE AND OBJECTIVE BOX
[facility]   [phone]  RENAL FOLLOW UP NOTE  --------------------------------------------------------------------------------  Chief Complaint:    24 hour events/subjective:    seen earlier. case d/w micu team     PAST HISTORY  --------------------------------------------------------------------------------  No significant changes to PMH, PSH, FHx, SHx, unless otherwise noted    ALLERGIES & MEDICATIONS  --------------------------------------------------------------------------------  Allergies    No Known Allergies    Intolerances      Standing Inpatient Medications  aspirin enteric coated 81 milliGRAM(s) Oral daily  atorvastatin 20 milliGRAM(s) Oral at bedtime  clopidogrel Tablet 75 milliGRAM(s) Oral at bedtime  cyclopentolate 2% Solution 1 Drop(s) Both EYES daily  dexamethasone/neomycin/polymyxin Suspension 1 Drop(s) Left EYE every 8 hours  dextrose 5%. 1000 milliLiter(s) IV Continuous <Continuous>  dextrose 50% Injectable 12.5 Gram(s) IV Push once  dextrose 50% Injectable 25 Gram(s) IV Push once  dextrose 50% Injectable 25 Gram(s) IV Push once  heparin  Injectable 5000 Unit(s) SubCutaneous every 12 hours  insulin lispro (HumaLOG) Pump 1 Each SubCutaneous Continuous Pump  ketorolac 0.5% Ophthalmic Solution 1 Drop(s) Both EYES three times a day  lisinopril 40 milliGRAM(s) Oral daily  metoprolol tartrate 25 milliGRAM(s) Oral two times a day  prednisoLONE acetate 1% Suspension 1 Drop(s) Both EYES three times a day    PRN Inpatient Medications  dextrose 40% Gel 15 Gram(s) Oral once PRN  glucagon  Injectable 1 milliGRAM(s) IntraMuscular once PRN      REVIEW OF SYSTEMS  --------------------------------------------------------------------------------    Gen: denies fevers/chills,  CVS: denies chest pain/palpitations  Resp: denies SOB/Cough  GI: Denies N/V/Abd pain  : Denies dysuria    All other systems were reviewed and are negative, except as noted.    VITALS/PHYSICAL EXAM  --------------------------------------------------------------------------------  T(C): 36.8 (07-05-18 @ 15:00), Max: 36.9 (07-05-18 @ 00:00)  HR: 77 (07-05-18 @ 16:00) (62 - 90)  BP: 134/75 (07-05-18 @ 16:00) (104/61 - 171/78)  RR: 23 (07-05-18 @ 16:00) (10 - 35)  SpO2: 99% (07-05-18 @ 16:00) (96% - 100%)  Wt(kg): --        07-04-18 @ 07:01  -  07-05-18 @ 07:00  --------------------------------------------------------  IN: 1973 mL / OUT: 1900 mL / NET: 73 mL    07-05-18 @ 07:01 - 07-05-18 @ 16:21  --------------------------------------------------------  IN: 0 mL / OUT: 50 mL / NET: -50 mL      Physical Exam:  	    Physical Exam:  	Gen: alert oriented place person and date   	Pulm: Decreased breath sounds b/l bases. no rales or ronchi or wheezing  	CV: RRR, S1/S2. no rub  	Abd: +BS, soft, nontender/nondistended  	: No suprapubic tenderness.               Extremity: No cyanosis, no edema no clubbing  	      LABS/STUDIES  --------------------------------------------------------------------------------              13.2   6.6   >-----------<  159      [07-05-18 @ 00:48]              36.6     135  |  91  |  27  ----------------------------<  314      [07-05-18 @ 11:29]  4.9   |  27  |  4.53        Ca     7.7     [07-05-18 @ 11:29]      Mg     2.2     [07-05-18 @ 11:29]      Phos  5.1     [07-05-18 @ 11:29]    TPro  7.6  /  Alb  4.5  /  TBili  0.3  /  DBili  x   /  AST  9   /  ALT  7   /  AlkPhos  157  [07-05-18 @ 11:29]          Creatinine Trend:  SCr 4.53 [07-05 @ 11:29]  SCr 3.30 [07-05 @ 00:48]  SCr 6.46 [07-04 @ 14:12]  SCr 5.95 [07-04 @ 10:41]  SCr 5.81 [07-04 @ 06:05]                  PTH -- (Ca 8.3)      [03-03-18 @ 08:53]   134  HbA1c 7.2      [07-03-18 @ 05:01]  TSH 1.07      [12-19-17 @ 06:12]  Lipid: chol 113, TG 86, HDL 59, LDL 37      [04-30-18 @ 06:44]

## 2018-07-05 NOTE — PROGRESS NOTE ADULT - ASSESSMENT
62 yr old female with PMHx Insulin dependent DM (on insulin pump) with frequent hospitalization for DKA ESRD HTN, HLD, HFrEF (40 to 45%), CAD, MI, s/p PCI RCA PVD s/p Lt and Rt fem pop bypasses, subclavian vein stenosis s/p stent, CVA. who now presents with c/o abd pain with no diarrhea. with vaginal bleed.  In ER  noticed with hyperglycemia. also with hyperkalemia with all specimen so far revealing hemolysis. pt did receive hd 7/2 for 2.5 her 4th treatment of the week.     1- esrd  2- hyperkalemia  3- DKA mild thus leading to high AGAP  4- shpt     recirc studies negative   hd am   k in range on repeat k level   for fistulogram in am   cont with insulin  very challenging in regards to her insulin regimen at home   agree with icu suggestion if possible to have pt VNS at home for her insulin regimen so that this brittle DM pt can have a bit better controlled glucose

## 2018-07-05 NOTE — PROGRESS NOTE ADULT - PROBLEM SELECTOR PLAN 1
Restart insulin pump.   Insulin pump ordered on [place].    Patient will get insulin Humalog from hospital pharmacy as she has no insulin vials with her.  She has all insulin pump supplies  Recommend to restart insulin pump with current setting at 3:30pm.  Please note that her basal coverage from Lantus yesterday will likely be not effective at 4pm and patient might be at risk for reopening AG and DKA.    Discussed with team. Restart insulin pump.   Insulin pump ordered on [place].    Patient will get insulin Humalog from hospital pharmacy as she has no insulin vials with her.  She has all insulin pump supplies  Recommend to restart insulin pump with current setting at 3:30pm.  Please note that her basal coverage from Lantus yesterday will likely be not effective at 4pm and patient might be at risk for reopening AG and DKA.    Discussed with team.    addendum: Patient will be NPO for surgery tomorrow.   Spoke with NP Miranda from accepting team.   Recommend patient to go on temp basal at 90% of her basal rate at midnight for 12 hours.    She may correct for hyperglycemia via pump if FSG is elevated as per ISF already set in pump.  Insulin active time was set to be 6 hours.

## 2018-07-05 NOTE — PROGRESS NOTE ADULT - ATTENDING COMMENTS
patient seen and examined.  Brittle diabetic presents in DKA.  REmains on low level insulin drip.  Glucoses in an acceptable range.  AG confounded by presence of ESRD.  Beta hydroxybutyrate is negative.  Agree with current management.
Agree with above plan. AG from DKA. For HD today. Follow up labs. Endocrine eval.
Agree with above. Seen and examined with residents. 62 year old with type I diabetes and ESRD. Labile FS and anion gap. Will discuss with endocrine restarting pump.

## 2018-07-05 NOTE — PROGRESS NOTE ADULT - SUBJECTIVE AND OBJECTIVE BOX
INTERVAL HPI/OVERNIGHT EVENTS: overnight transitioned to basal bolus and started on diet. In AM with elevated glucose on BMP and inc bhb. transitioned back to gtt.    SUBJECTIVE: Patient seen and examined at bedside. Denies CP, SOB, abdominal pain. Reports she wants to go home.    OBJECTIVE:    VITAL SIGNS:  ICU Vital Signs Last 24 Hrs  T(C): 36.9 (05 Jul 2018 00:00), Max: 37 (04 Jul 2018 08:00)  T(F): 98.5 (05 Jul 2018 00:00), Max: 98.6 (04 Jul 2018 08:00)  HR: 70 (05 Jul 2018 07:00) (62 - 90)  BP: 119/56 (05 Jul 2018 07:00) (103/68 - 171/78)  BP(mean): 81 (05 Jul 2018 07:00) (81 - 112)  ABP: --  ABP(mean): --  RR: 10 (05 Jul 2018 07:00) (10 - 35)  SpO2: 97% (05 Jul 2018 07:00) (96% - 100%)        07-04 @ 07:01  -  07-05 @ 07:00  --------------------------------------------------------  IN: 1973 mL / OUT: 1900 mL / NET: 73 mL      CAPILLARY BLOOD GLUCOSE      POCT Blood Glucose.: 156 mg/dL (04 Jul 2018 21:38)      PHYSICAL EXAM:    General: NAD  HEENT: NC/AT; PERRL, clear conjunctiva  Neck: supple  Respiratory: CTA b/l  Cardiovascular: +S1/S2; RRR  Abdomen: soft, NT/ND; +BS x4  Extremities: WWP, avf in LUE  Skin: normal color and turgor; no rash  Neurological: no focal deficits    MEDICATIONS:  MEDICATIONS  (STANDING):  aspirin enteric coated 81 milliGRAM(s) Oral daily  atorvastatin 20 milliGRAM(s) Oral at bedtime  clopidogrel Tablet 75 milliGRAM(s) Oral at bedtime  cyclopentolate 2% Solution 1 Drop(s) Both EYES daily  dexamethasone/neomycin/polymyxin Suspension 1 Drop(s) Left EYE every 8 hours  heparin  Injectable 5000 Unit(s) SubCutaneous every 12 hours  insulin Infusion 0.5 Unit(s)/Hr (0.5 mL/Hr) IV Continuous <Continuous>  ketorolac 0.5% Ophthalmic Solution 1 Drop(s) Both EYES three times a day  lisinopril 40 milliGRAM(s) Oral daily  metoprolol tartrate 25 milliGRAM(s) Oral two times a day  prednisoLONE acetate 1% Suspension 1 Drop(s) Both EYES three times a day    MEDICATIONS  (PRN):      ALLERGIES:  Allergies    No Known Allergies    Intolerances        LABS:                        13.2   6.6   )-----------( 159      ( 05 Jul 2018 00:48 )             36.6     07-05    134<L>  |  89<L>  |  17  ----------------------------<  319<H>  5.8<H>   |  22  |  3.30<H>    Ca    8.4      05 Jul 2018 00:48  Phos  4.9     07-05  Mg     2.2     07-05    TPro  8.2  /  Alb  4.5  /  TBili  0.4  /  DBili  x   /  AST  30  /  ALT  12  /  AlkPhos  173<H>  07-05          RADIOLOGY & ADDITIONAL TESTS: Reviewed. INTERVAL HPI/OVERNIGHT EVENTS: overnight transitioned to basal bolus and started on diet. In AM with elevated glucose on BMP and inc bhb. transitioned back to gtt.    SUBJECTIVE: Patient seen and examined at bedside. Denies CP, SOB, abdominal pain. Reports she wants her pump on.     OBJECTIVE:    VITAL SIGNS:  ICU Vital Signs Last 24 Hrs  T(C): 36.9 (05 Jul 2018 00:00), Max: 37 (04 Jul 2018 08:00)  T(F): 98.5 (05 Jul 2018 00:00), Max: 98.6 (04 Jul 2018 08:00)  HR: 70 (05 Jul 2018 07:00) (62 - 90)  BP: 119/56 (05 Jul 2018 07:00) (103/68 - 171/78)  BP(mean): 81 (05 Jul 2018 07:00) (81 - 112)  ABP: --  ABP(mean): --  RR: 10 (05 Jul 2018 07:00) (10 - 35)  SpO2: 97% (05 Jul 2018 07:00) (96% - 100%)        07-04 @ 07:01  -  07-05 @ 07:00  --------------------------------------------------------  IN: 1973 mL / OUT: 1900 mL / NET: 73 mL      CAPILLARY BLOOD GLUCOSE      POCT Blood Glucose.: 156 mg/dL (04 Jul 2018 21:38)      PHYSICAL EXAM:    General: NAD  HEENT: NC/AT; PERRL, clear conjunctiva  Neck: supple  Respiratory: CTA b/l  Cardiovascular: +S1/S2; RRR  Abdomen: soft, NT/ND; +BS x4  Extremities: WWP, avf in LUE  Skin: normal color and turgor; no rash  Neurological: no focal deficits    MEDICATIONS:  MEDICATIONS  (STANDING):  aspirin enteric coated 81 milliGRAM(s) Oral daily  atorvastatin 20 milliGRAM(s) Oral at bedtime  clopidogrel Tablet 75 milliGRAM(s) Oral at bedtime  cyclopentolate 2% Solution 1 Drop(s) Both EYES daily  dexamethasone/neomycin/polymyxin Suspension 1 Drop(s) Left EYE every 8 hours  heparin  Injectable 5000 Unit(s) SubCutaneous every 12 hours  insulin Infusion 0.5 Unit(s)/Hr (0.5 mL/Hr) IV Continuous <Continuous>  ketorolac 0.5% Ophthalmic Solution 1 Drop(s) Both EYES three times a day  lisinopril 40 milliGRAM(s) Oral daily  metoprolol tartrate 25 milliGRAM(s) Oral two times a day  prednisoLONE acetate 1% Suspension 1 Drop(s) Both EYES three times a day    MEDICATIONS  (PRN):      ALLERGIES:  Allergies    No Known Allergies    Intolerances        LABS:                        13.2   6.6   )-----------( 159      ( 05 Jul 2018 00:48 )             36.6     07-05    134<L>  |  89<L>  |  17  ----------------------------<  319<H>  5.8<H>   |  22  |  3.30<H>    Ca    8.4      05 Jul 2018 00:48  Phos  4.9     07-05  Mg     2.2     07-05    TPro  8.2  /  Alb  4.5  /  TBili  0.4  /  DBili  x   /  AST  30  /  ALT  12  /  AlkPhos  173<H>  07-05          RADIOLOGY & ADDITIONAL TESTS: Reviewed.

## 2018-07-05 NOTE — CHART NOTE - NSCHARTNOTEFT_GEN_A_CORE
61yo F h/o DM1 (has insulin pump, non-adherent) w frequent hospitalizations (last 05/2018) for DKA, ESRD on HD (PEPEE AVF, M/T/Th/Sa), CAD s/p  stents, HTN HLD, CVA, PVD. Presents from home w nausea, anorexia, and abdominal pain starting today. Went to HD this morning, then at home was feeling general malaise. She began to experience diffuse bilat lower abdominal pain as well as RUQ pain.     Today pt also noted 1 ep large quantity of bright red blood per vagina. On previous admission in March pt had similar complaint, was evaluated by GYN who recommended outpt f/u.   In the ED VS: T 98.1 HR 85 /71 RR 20 SpO2 97% RA  Labs notable for K+ 6.6 -> 7.4, bicarb 19, AG 27, Cr 8.13, Glucose 294, B-hydroxy 1.1, pH 7.30  Pt was started on insulin drip in the ED. Nephrology consult obtained for HD.   Pt was admitted to MICU for DKA. 62F h/o DM1 (has insulin pump, non-adherent) w frequent hospitalizations (last 05/2018) for DKA, ESRD on HD (LUE AVF, M/T/Th/Sa), CAD s/p  stents, HTN, HLD, CVA, PVD. Presented from home w/ nausea, anorexia, and abdominal pain starting today. Went to HD in the AM, then at home was feeling general malaise. She began to experience diffuse bilat lower abdominal pain as well as RUQ pain.     Today pt also noted 1 ep large quantity of bright red blood per vagina. On previous admission in March pt had similar complaint, was evaluated by GYN who recommended outpt f/u.   In the ED VS: T 98.1 HR 85 /71 RR 20 SpO2 97% RA  Labs notable for K+ 6.6 -> 7.4, bicarb 19, AG 27, Cr 8.13, Glucose 294, B-hydroxy 1.1, pH 7.30  Pt was started on insulin drip in the ED. Nephrology consult obtained for HD.   Pt was admitted to MICU for DKA. 62F h/o DM1 (has insulin pump, non-adherent) w frequent hospitalizations (last 05/2018) for DKA, ESRD on HD (LUE AVF, M/T/Th/Sa), CAD s/p  stents, HTN, HLD, CVA, PVD. Presented from home w/ nausea, anorexia, and abdominal pain starting today. Went to HD in the AM, then at home was feeling general malaise. She began to experience diffuse bilat lower abdominal pain as well as RUQ pain.     In the ED VS: T 98.1 HR 85 /71 RR 20 SpO2 97% RA  Labs notable for K+ 6.6 -> 7.4, bicarb 19, AG 27, Cr 8.13, Glucose 294, B-hydroxy 1.1, pH 7.30  Pt was started on insulin drip in the ED. Nephrology following for HD.   Pt was admitted to MICU for DKA. 62F h/o DM1 (has insulin pump, non-adherent) w frequent hospitalizations (last 05/2018) for DKA, ESRD on HD (LUE AVF, M/T/Th/Sa), CAD s/p  stents, HTN, HLD, CVA, PVD. Presented from home w/ nausea, anorexia, and abdominal pain starting today. Went to HD in the AM, then at home was feeling general malaise. She began to experience diffuse bilat lower abdominal pain as well as RUQ pain.     In the ED VS: T 98.1 HR 85 /71 RR 20 SpO2 97% RA  Labs notable for K+ 6.6 -> 7.4, bicarb 19, AG 27, Cr 8.13, Glucose 294, B-hydroxy 1.1, pH 7.30  Pt was started on insulin drip in the ED. Nephrology following for HD. Patient received HD on 7/3 and 7/4 while admitted. Due to repeated high K and creatinine despite HD vascular was consulted for AVF eval. Duplex revealed severe stenosis with venous outflow and patient is pending fistuloplasty in AM.   Pt was admitted to MICU for DKA. 62F h/o DM1 (has insulin pump, non-adherent) w frequent hospitalizations (last 05/2018) for DKA, ESRD on HD (LUE AVF, M/T/Th/Sa), CAD s/p  stents, HTN, HLD, CVA, PVD. Presented from home w/ nausea, anorexia, and abdominal pain starting today. Went to HD in the AM, then at home was feeling general malaise. She began to experience diffuse bilat lower abdominal pain as well as RUQ pain.     In the ED VS: T 98.1 HR 85 /71 RR 20 SpO2 97% RA  Labs notable for K+ 6.6 -> 7.4, bicarb 19, AG 27, Cr 8.13, Glucose 294, B-hydroxy 1.1, pH 7.30  Pt was started on insulin drip in the ED and admitted to MICU for DKA management. Nephrology following for HD. Patient received HD on 7/3 and 7/4 while admitted. Due to repeated high K and creatinine despite HD vascular was consulted for AVF eval. Duplex revealed severe stenosis with venous outflow and patient is pending fistuloplasty in AM. Patient had a closed AG on 7/5 and drip of .5u/hr was transitioned to basal bolus of 10 lantus and 4/4/4 lispro. Endocrine was consulted for insulin pump management and pump was restarted at 3:30pm on 7/5/18. Patient tolerating PO diet and abdominal pain resolved on 2nd day of admission. Patient remained HDS.     #Neuro- AAOx3 (at baseline AAO x2-3). H/o CVA.     #Pulm- no active issues, on room air    #Cardiovasc- HTN, HLD, CAD, HFrEF (40-45%), PVD. no active symptoms  - C/w ASA, Plavix, lipitor, lisinopril, metoprolol  - patient has no active CP, SOB, palpitations, patient is HDS and at baseline functional status - AOx4, ambulatory on RA, there are no medical contraindications for fistuloplasty    #GI- consistent carb/renal restriction diet    #Renal- ESRD on HD (M/T/Th/Sa), last dialyzed 7/4 evening  - vascular consulted given creatinine of 8 despite having adequate HD for concern for malfunctioning of AVF -  scheduled for fistuloplasty friday, needs pre-op labs (cbc, bmp, coags, type and screen, EKG and CXR)    #Metabolic  -HAGMA w metabolic alkalosis (d-d 3.0) 2/2 DKA with diuretic use - now resolved  -K+ elevated in setting of ESRD, dialyzed yesterday    #Endo- admitted for DKA. FS very labile. Non-compliant w insulin pump.   - yesterday with dec FS and neg BHB, transitioned to lantus 9, premeal 3U and ISS with diet. Inc glucose on BMP this AM with BHB 1.1, restarted gtt and made NPO, AG inc from 20 to 23  - Insulin drip @0.5u/h. FS q1h, BMP q4 follow DKA protocol - NPO   #ID- Currently no infectious process identified- no leukocytosis/fever/S+S  #DVT ppx- hep sc 62F h/o DM1 (has insulin pump, non-adherent) w frequent hospitalizations (last 05/2018) for DKA, ESRD on HD (LUE AVF, M/T/Th/Sa), CAD s/p  stents, HTN, HLD, CVA, PVD. Presented from home w/ nausea, anorexia, and abdominal pain starting today. Went to HD in the AM, then at home was feeling general malaise. She began to experience diffuse bilat lower abdominal pain as well as RUQ pain.     In the ED VS: T 98.1 HR 85 /71 RR 20 SpO2 97% RA  Labs notable for K+ 6.6 -> 7.4, bicarb 19, AG 27, Cr 8.13, Glucose 294, B-hydroxy 1.1, pH 7.30  Pt was started on insulin drip in the ED and admitted to MICU for DKA management. Nephrology following for HD. Patient received HD on 7/3 and 7/4 while admitted. Due to repeated high K and creatinine despite HD vascular was consulted for AVF eval. Duplex revealed severe stenosis with venous outflow and patient is pending fistuloplasty in AM. Patient had a closed AG on 7/5 and drip of .5u/hr was transitioned to basal bolus of 10 lantus and 4/4/4 lispro. Endocrine was consulted for insulin pump management and pump was restarted at 3:30pm on 7/5/18. Patient tolerating PO diet and abdominal pain resolved on 2nd day of admission. Patient remained HDS.     #Neuro- AAOx3 (at baseline AAO x2-3). H/o CVA.     #Pulm- no active issues, on room air    #Cardiovasc- HTN, HLD, CAD, HFrEF (40-45%), PVD. no active symptoms  - C/w ASA, Plavix, lipitor, lisinopril, metoprolol  - patient has no active CP, SOB, palpitations, patient is HDS and at baseline functional status - AOx4, ambulatory on RA, there are no medical contraindications for fistuloplasty    #GI- consistent carb/renal restriction diet    #Renal- ESRD on HD (M/T/Th/Sa), last dialyzed 7/4 evening  - vascular consulted given creatinine of 8 despite having adequate HD for concern for malfunctioning of AVF -  scheduled for fistuloplasty friday, needs pre-op labs (cbc, bmp, coags, type and screen, EKG and CXR)    #Metabolic  -HAGMA w metabolic alkalosis (d-d 3.0) 2/2 DKA with diuretic use - now resolved  -K+ elevated in setting of ESRD, on HD    #Endo- admitted for DKA. FS very labile. Non-compliant w insulin pump.   - now back on insulin pump per endocrine  - CC diet    #ID- no active issues    #DVT ppx- hep sc 62F h/o DM1 (has insulin pump, non-adherent) w frequent hospitalizations (last 05/2018) for DKA, ESRD on HD (LUE AVF, M/T/Th/Sa), CAD s/p  stents, HTN, HLD, CVA, PVD. Presented from home w/ nausea, anorexia, and abdominal pain starting today. Went to HD in the AM, then at home was feeling general malaise. She began to experience diffuse bilat lower abdominal pain as well as RUQ pain.     In the ED VS: T 98.1 HR 85 /71 RR 20 SpO2 97% RA  Labs notable for K+ 6.6 -> 7.4, bicarb 19, AG 27, Cr 8.13, Glucose 294, B-hydroxy 1.1, pH 7.30  Pt was started on insulin drip in the ED and admitted to MICU for DKA management. Nephrology following for HD. Patient received HD on 7/3 and 7/4 while admitted. Due to repeated high K and creatinine despite HD vascular was consulted for AVF eval. Duplex revealed severe stenosis with venous outflow and patient is pending fistuloplasty in AM. Patient had a closed AG on 7/5 and drip of .5u/hr was transitioned to basal bolus of 10 lantus and 4/4/4 lispro. Endocrine was consulted for insulin pump management and pump was restarted at 3:30pm on 7/5/18. Patient tolerating PO diet and abdominal pain resolved on 2nd day of admission. Patient remained HDS.     #Neuro- AAOx3 (at baseline AAO x2-3). H/o CVA.     #Pulm- no active issues, on room air    #Cardiovasc- HTN, HLD, CAD, HFrEF (40-45%), PVD. no active symptoms  - C/w ASA, Plavix, lipitor, lisinopril, metoprolol  - patient has no active CP, SOB, palpitations, patient is HDS and at baseline functional status - AOx4, ambulatory on RA, there are no medical contraindications for fistuloplasty    #GI- consistent carb/renal restriction diet    #Renal- ESRD on HD (M/T/Th/Sa), last dialyzed 7/4 evening  - vascular consulted given creatinine of 8 despite having adequate HD for concern for malfunctioning of AVF -  scheduled for fistuloplasty friday, needs pre-op labs (cbc, bmp, coags, type and screen, EKG and CXR)    #Metabolic  -HAGMA w metabolic alkalosis (d-d 3.0) 2/2 DKA with diuretic use - now resolved  -K+ elevated in setting of ESRD, on HD    #Endo- admitted for DKA. FS very labile. Non-compliant w insulin pump.   - now back on insulin pump per endocrine  - CC diet  - discussed with endocrine regarding insulin pump for OR tomorrow, OK to keep on current dosage based on recent FS    #ID- no active issues    #DVT ppx- hep sc

## 2018-07-05 NOTE — PROGRESS NOTE ADULT - ASSESSMENT
63yo F h/o DM1 (has insulin pump, non-adherent) w frequent hospitalizations (last 05/2018) for DKA, ESRD on HD (LUE AVF, M/T/Th/Sa, last HD this morning), CAD s/p  stents, HTN HLD, CVA, PVD presents from home w nausea, anorexia, and abdominal pain. Found to be in DKA and admitted to MICU.  Now stable, tolearting PO.  Wants to be transitioned back to insulin pump.  Last lantus does at 4pm yesterday.

## 2018-07-05 NOTE — PROGRESS NOTE ADULT - ASSESSMENT
61y/o F w/ ESRD on HD via LUE AVF. Admitted for DKA and hyperkalemia requiring urgent HD. Nephrology consulting vascular surgery for evaluation of the fistula. There is concern for outflow stenosis. At this time pt is in MICU on insulin GTT    PLAN:   - F/u LUE vascular ultrasound results.   - Plan for LUE AV fistuloplasty Friday if patient has medical and cardiac clearance. If patient is going to be discharged, patient can follow-up as an outpatient with Dr. Elizalde to re-schedule procedure.   - Please document medical and cardiac clearance for the operation  - Will pre-op and consent patient    Jesse Willoughby MD PGY2  p [id number] 61y/o F w/ ESRD on HD via LUE AVF. Admitted for DKA and hyperkalemia requiring urgent HD. Nephrology consulting vascular surgery for evaluation of the fistula. There is concern for outflow stenosis. At this time pt is in MICU on insulin GTT    PLAN:   - F/u LUE vascular ultrasound results.   - Plan for LUE AV fistuloplasty Friday if patient has medical clearance. If patient is going to be discharged, patient can follow-up as an outpatient with Dr. Elizalde to re-schedule procedure.   - Please document medical clearance for the operation  - Will pre-op and consent patient    Jesse Willoughby MD PGY2  p [phone]

## 2018-07-05 NOTE — PROGRESS NOTE ADULT - SUBJECTIVE AND OBJECTIVE BOX
Patient is a 62y old  Female who presents with a chief complaint of DKA (03 Jul 2018 02:05)      SUBJECTIVE / OVERNIGHT EVENTS: awake, alert,  No new complaints.   Review of Systems  chest pain no  palpitations no  sob no  nausea no  headache no    MEDICATIONS  (STANDING):  aspirin enteric coated 81 milliGRAM(s) Oral daily  atorvastatin 20 milliGRAM(s) Oral at bedtime  clopidogrel Tablet 75 milliGRAM(s) Oral at bedtime  cyclopentolate 2% Solution 1 Drop(s) Both EYES daily  dexamethasone/neomycin/polymyxin Suspension 1 Drop(s) Left EYE every 8 hours  dextrose 5%. 1000 milliLiter(s) (50 mL/Hr) IV Continuous <Continuous>  dextrose 50% Injectable 12.5 Gram(s) IV Push once  dextrose 50% Injectable 25 Gram(s) IV Push once  dextrose 50% Injectable 25 Gram(s) IV Push once  heparin  Injectable 5000 Unit(s) SubCutaneous every 12 hours  insulin glargine Injectable (LANTUS) 10 Unit(s) SubCutaneous at bedtime  insulin lispro (HumaLOG) corrective regimen sliding scale   SubCutaneous three times a day before meals  insulin lispro (HumaLOG) Pump 1 Each SubCutaneous Continuous Pump  insulin lispro Injectable (HumaLOG) 4 Unit(s) SubCutaneous three times a day before meals  ketorolac 0.5% Ophthalmic Solution 1 Drop(s) Both EYES three times a day  lisinopril 40 milliGRAM(s) Oral daily  metoprolol tartrate 25 milliGRAM(s) Oral two times a day  prednisoLONE acetate 1% Suspension 1 Drop(s) Both EYES three times a day    MEDICATIONS  (PRN):  dextrose 40% Gel 15 Gram(s) Oral once PRN Blood Glucose LESS THAN 70 milliGRAM(s)/deciLiter  glucagon  Injectable 1 milliGRAM(s) IntraMuscular once PRN Glucose <70 milliGRAM(s)/deciLiter          PHYSICAL EXAM:  GENERAL: NAD  HEAD:  Atraumatic, Normocephalic  EYES: EOMI, PERRLA, conjunctiva and sclera clear  NECK: Supple, No JVD  CHEST/LUNG: Clear to auscultation bilaterally; No wheeze  HEART: Regular rate and rhythm; No murmurs, rubs, or gallops  ABDOMEN: Soft, Nontender, Nondistended; Bowel sounds present  EXTREMITIES:  2+ Peripheral Pulses, No clubbing, cyanosis, or edema  NEUROLOGY: non-focal  SKIN: No rashes or lesions    LABS:                        13.2   6.6   )-----------( 159      ( 05 Jul 2018 00:48 )             36.6     07-05    135  |  91<L>  |  27<H>  ----------------------------<  314<H>  4.9   |  27  |  4.53<H>    Ca    7.7<L>      05 Jul 2018 11:29  Phos  5.1     07-05  Mg     2.2     07-05    TPro  7.6  /  Alb  4.5  /  TBili  0.3  /  DBili  x   /  AST  9<L>  /  ALT  7<L>  /  AlkPhos  157<H>  07-05              RADIOLOGY & ADDITIONAL TESTS:    Imaging Personally Reviewed:    Consultant(s) Notes Reviewed:      Care Discussed with Consultants/Other Providers:

## 2018-07-05 NOTE — PROGRESS NOTE ADULT - ASSESSMENT
62 f with  DKA resolving- continue insulin, endocrine follow  ESRD- HD  AVF reevaluation by vascular svc in am  CAD stable  Vaginal bleeding resolved- Gyn evaluation as OTP  ICU care   Pierce Viera MD pager [phone]

## 2018-07-05 NOTE — PROGRESS NOTE ADULT - SUBJECTIVE AND OBJECTIVE BOX
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 Elizalde    - Labs:                        13.2   6.6   )-----------( 159      ( 05 Jul 2018 00:48 )             36.6     07-05    135  |  91<L>  |  27<H>  ----------------------------<  314<H>  4.9   |  27  |  4.53<H>    Ca    7.7<L>      05 Jul 2018 11:29  Phos  5.1     07-05  Mg     2.2     07-05    TPro  7.6  /  Alb  4.5  /  TBili  0.3  /  DBili  x   /  AST  9<L>  /  ALT  7<L>  /  AlkPhos  157<H>  07-05      Type & Screen #1: AM Labs  Type & Screen #2: AM Labs    - CXR: AM Chest xray     - EKG: To be collected in MICU    - AICD/Pacemaker Interrogation Date: Not applicable.    - Blood: Not needed.     - Pregnancy Test: Not needed    - Orders:  > NPO at midnight  > IVF at midnight  > Perioperative antibiotics not needed.  > Morning Labs: CBC, BMP, coags, type & screen  > Diabetic orders adjusted for NPO period.    - Permits:  > Consent in chart.  > Case scheduled with OR.  > May continue VTE ppx perioperativley

## 2018-07-05 NOTE — CHART NOTE - NSCHARTNOTEFT_GEN_A_CORE
Patient is NPO after MN for OR procedure and states that she is unable to temporarily adjust the basal rate on her insulin pump.      Spoke with Dr. Kaur from Endocrinology - patient to receive 9 units of Lantus now and the insulin pump will be removed 1 hour later.  Patient has already administered her bolus insulin dose via pump.  Will order pre-meal insulin and low correction scale pending Endocrine follow-up as advised.    Miranda Jordan NP

## 2018-07-05 NOTE — PROGRESS NOTE ADULT - SUBJECTIVE AND OBJECTIVE BOX
Vascular Surgery Progress Note (p [id number])    SUBJECTIVE:  The patient was seen and examined this morning during rounds. Overnight had fingerstick glucose of 156 and was transitioned off insulin gtt and to regular diet. AM labs glucose was elevated to 319. At time of exam pt was off insulin drip but per MICU note, insulin gtt now restarted. This AM the patient reported she was doing well, denies pain. Had HD yesterday and recirculation study done. No official read in yet.    OBJECTIVE:     ** VITAL SIGNS / I&O's **    Vital Signs Last 24 Hrs  T(C): 36.6 (05 Jul 2018 07:00), Max: 36.9 (04 Jul 2018 12:00)  T(F): 97.9 (05 Jul 2018 07:00), Max: 98.5 (05 Jul 2018 00:00)  HR: 74 (05 Jul 2018 09:00) (62 - 90)  BP: 138/74 (05 Jul 2018 09:00) (103/68 - 171/78)  BP(mean): 100 (05 Jul 2018 09:00) (81 - 112)  RR: 27 (05 Jul 2018 09:00) (10 - 35)  SpO2: 99% (05 Jul 2018 09:00) (96% - 100%)      04 Jul 2018 07:01  -  05 Jul 2018 07:00  --------------------------------------------------------  IN:    insulin Infusion: 5 mL    insulin Infusion: 3 mL    Oral Fluid: 735 mL    Other: 900 mL    sodium chloride 0.9%: 330 mL  Total IN: 1973 mL    OUT:    Other: 1900 mL  Total OUT: 1900 mL    Total NET: 73 mL          ** PHYSICAL EXAM **    -- CONSTITUTIONAL: Alert, NAD  -- PULMONARY: non-labored respirations on room air  -- EXTREMITIES: LUE warm, AVF pulsatile w/ thrill. Palpable radial pulse, no numbness/weakness of L hand.    ** LABS **                          13.2   6.6   )-----------( 159      ( 05 Jul 2018 00:48 )             36.6     05 Jul 2018 00:48    134    |  89     |  17     ----------------------------<  319    5.8     |  22     |  3.30     Ca    8.4        05 Jul 2018 00:48  Phos  4.9       05 Jul 2018 00:48  Mg     2.2       05 Jul 2018 00:48    TPro  8.2    /  Alb  4.5    /  TBili  0.4    /  DBili  x      /  AST  30     /  ALT  12     /  AlkPhos  173    05 Jul 2018 00:48      CAPILLARY BLOOD GLUCOSE      POCT Blood Glucose.: 284 mg/dL (05 Jul 2018 08:39)  POCT Blood Glucose.: 156 mg/dL (04 Jul 2018 21:38)  POCT Blood Glucose.: 181 mg/dL (04 Jul 2018 16:20)  POCT Blood Glucose.: 194 mg/dL (04 Jul 2018 13:31)  POCT Blood Glucose.: 157 mg/dL (04 Jul 2018 12:15)  POCT Blood Glucose.: 177 mg/dL (04 Jul 2018 11:23)  POCT Blood Glucose.: 177 mg/dL (04 Jul 2018 10:07)        LIVER FUNCTIONS - ( 05 Jul 2018 00:48 )  Alb: 4.5 g/dL / Pro: 8.2 g/dL / ALK PHOS: 173 U/L / ALT: 12 U/L / AST: 30 U/L / GGT: x             Culture - Blood (collected 03 Jul 2018 05:37)  Source: .Blood Blood-Peripheral  Preliminary Report (04 Jul 2018 06:01):    No growth to date.          MEDICATIONS  (STANDING):  aspirin enteric coated 81 milliGRAM(s) Oral daily  atorvastatin 20 milliGRAM(s) Oral at bedtime  clopidogrel Tablet 75 milliGRAM(s) Oral at bedtime  cyclopentolate 2% Solution 1 Drop(s) Both EYES daily  dexamethasone/neomycin/polymyxin Suspension 1 Drop(s) Left EYE every 8 hours  heparin  Injectable 5000 Unit(s) SubCutaneous every 12 hours  insulin glargine Injectable (LANTUS) 10 Unit(s) SubCutaneous at bedtime  insulin lispro (HumaLOG) corrective regimen sliding scale   SubCutaneous three times a day before meals  insulin lispro Injectable (HumaLOG) 4 Unit(s) SubCutaneous three times a day before meals  ketorolac 0.5% Ophthalmic Solution 1 Drop(s) Both EYES three times a day  lisinopril 40 milliGRAM(s) Oral daily  metoprolol tartrate 25 milliGRAM(s) Oral two times a day  prednisoLONE acetate 1% Suspension 1 Drop(s) Both EYES three times a day    MEDICATIONS  (PRN): Vascular Surgery Progress Note (p [id number])    SUBJECTIVE:  The patient was seen and examined this morning during rounds. Overnight had fingerstick glucose of 156 and was transitioned off insulin gtt and to regular diet. AM labs glucose was elevated to 319. At time of exam pt was off insulin drip but per MICU note, insulin gtt now restarted. This AM the patient reported she was doing well, denies pain.     OBJECTIVE:     ** VITAL SIGNS / I&O's **    Vital Signs Last 24 Hrs  T(C): 36.6 (05 Jul 2018 07:00), Max: 36.9 (04 Jul 2018 12:00)  T(F): 97.9 (05 Jul 2018 07:00), Max: 98.5 (05 Jul 2018 00:00)  HR: 74 (05 Jul 2018 09:00) (62 - 90)  BP: 138/74 (05 Jul 2018 09:00) (103/68 - 171/78)  BP(mean): 100 (05 Jul 2018 09:00) (81 - 112)  RR: 27 (05 Jul 2018 09:00) (10 - 35)  SpO2: 99% (05 Jul 2018 09:00) (96% - 100%)      04 Jul 2018 07:01  -  05 Jul 2018 07:00  --------------------------------------------------------  IN:    insulin Infusion: 5 mL    insulin Infusion: 3 mL    Oral Fluid: 735 mL    Other: 900 mL    sodium chloride 0.9%: 330 mL  Total IN: 1973 mL    OUT:    Other: 1900 mL  Total OUT: 1900 mL    Total NET: 73 mL          ** PHYSICAL EXAM **    -- CONSTITUTIONAL: Alert, NAD  -- PULMONARY: non-labored respirations on room air  -- EXTREMITIES: LUE warm, AVF pulsatile w/ thrill. Palpable radial pulse, no numbness/weakness of L hand.    ** LABS **                          13.2   6.6   )-----------( 159      ( 05 Jul 2018 00:48 )             36.6     05 Jul 2018 00:48    134    |  89     |  17     ----------------------------<  319    5.8     |  22     |  3.30     Ca    8.4        05 Jul 2018 00:48  Phos  4.9       05 Jul 2018 00:48  Mg     2.2       05 Jul 2018 00:48    TPro  8.2    /  Alb  4.5    /  TBili  0.4    /  DBili  x      /  AST  30     /  ALT  12     /  AlkPhos  173    05 Jul 2018 00:48      CAPILLARY BLOOD GLUCOSE      POCT Blood Glucose.: 284 mg/dL (05 Jul 2018 08:39)  POCT Blood Glucose.: 156 mg/dL (04 Jul 2018 21:38)  POCT Blood Glucose.: 181 mg/dL (04 Jul 2018 16:20)  POCT Blood Glucose.: 194 mg/dL (04 Jul 2018 13:31)  POCT Blood Glucose.: 157 mg/dL (04 Jul 2018 12:15)  POCT Blood Glucose.: 177 mg/dL (04 Jul 2018 11:23)  POCT Blood Glucose.: 177 mg/dL (04 Jul 2018 10:07)        LIVER FUNCTIONS - ( 05 Jul 2018 00:48 )  Alb: 4.5 g/dL / Pro: 8.2 g/dL / ALK PHOS: 173 U/L / ALT: 12 U/L / AST: 30 U/L / GGT: x             Culture - Blood (collected 03 Jul 2018 05:37)  Source: .Blood Blood-Peripheral  Preliminary Report (04 Jul 2018 06:01):    No growth to date.          MEDICATIONS  (STANDING):  aspirin enteric coated 81 milliGRAM(s) Oral daily  atorvastatin 20 milliGRAM(s) Oral at bedtime  clopidogrel Tablet 75 milliGRAM(s) Oral at bedtime  cyclopentolate 2% Solution 1 Drop(s) Both EYES daily  dexamethasone/neomycin/polymyxin Suspension 1 Drop(s) Left EYE every 8 hours  heparin  Injectable 5000 Unit(s) SubCutaneous every 12 hours  insulin glargine Injectable (LANTUS) 10 Unit(s) SubCutaneous at bedtime  insulin lispro (HumaLOG) corrective regimen sliding scale   SubCutaneous three times a day before meals  insulin lispro Injectable (HumaLOG) 4 Unit(s) SubCutaneous three times a day before meals  ketorolac 0.5% Ophthalmic Solution 1 Drop(s) Both EYES three times a day  lisinopril 40 milliGRAM(s) Oral daily  metoprolol tartrate 25 milliGRAM(s) Oral two times a day  prednisoLONE acetate 1% Suspension 1 Drop(s) Both EYES three times a day    MEDICATIONS  (PRN):

## 2018-07-05 NOTE — PROGRESS NOTE ADULT - ASSESSMENT
62y F h/o DM1 (non-adherent w insulin pump) w frequent hospitalizaitons for DKA, ESRD on HD (M/T/Th/Sa, last HD this am), HTN, HLD, CAD s/p stents, CVA, PVD. Admitted to MICU for DKA and hyperkalemia requiring urgent HD.  #Neuro- AAOx3 (at baseline AAO x2-3). H/o CVA.   #Pulm- no active issues, on room air  #Cardiovasc- HTN, HLD, CAD, HFrEF (40-45%), PVD. no active symptoms  - High sens tpn T 158 -> 167 in the setting of ESRD, has baseline elev trop per previous admissions. EKG unchanged.  -C/w ASA, Plavix, lipitor, lisinopril, metoprolol  #GI/  - NPO on insulin gtt  #Renal- ESRD on HD (M/T/Th/Sa), last dialyzed Wednesday evening  - vascular consulted given creatinine of 8 despite having adequate HD for concern for malfunctioning of AVF - tentatively scheduled for fistuloplasty friday    #Metabolic  -HAGMA w metabolic alkalosis (d-d 3.0) 2/2 DKA with diuretic use  -DKA mgmt as below  -K+ elevated in setting of ESRD, dialyzed yesterday  #Endo- admitted for DKA. FS very labile. Non-compliant w insulin pump.   - yesterday with dec FS and neg BHB, transitioned to lantus 9, premeal 3U and ISS with diet. Inc glucose on BMP this AM with BHB 1.1, restarted gtt and made NPO, AG inc from 20 to 23  - Insulin drip @0.5u/h. FS q1h, BMP q4 follow DKA protocol - NPO   #ID- Currently no infectious process identified- no leukocytosis/fever/S+S  #DVT ppx- hep sc 62y F h/o DM1 (non-adherent w insulin pump) w frequent hospitalizaitons for DKA, ESRD on HD (M/T/Th/Sa, last HD this am), HTN, HLD, CAD s/p stents, CVA, PVD. Admitted to MICU for DKA and hyperkalemia requiring urgent HD.  #Neuro- AAOx3 (at baseline AAO x2-3). H/o CVA.   #Pulm- no active issues, on room air  #Cardiovasc- HTN, HLD, CAD, HFrEF (40-45%), PVD. no active symptoms  - High sens tpn T 158 -> 167 in the setting of ESRD, has baseline elev trop per previous admissions. EKG unchanged, C/w ASA, Plavix, lipitor, lisinopril, metoprolol  - patient has no active CP, SOB, palpitations, patient is HDS and at baseline functional status - AOx4, ambulatory on RA, there are no medical contraindications for fistuloplasty  #GI/  - NPO on insulin gtt  #Renal- ESRD on HD (M/T/Th/Sa), last dialyzed Wednesday evening  - vascular consulted given creatinine of 8 despite having adequate HD for concern for malfunctioning of AVF - tentatively scheduled for fistuloplasty friday    #Metabolic  -HAGMA w metabolic alkalosis (d-d 3.0) 2/2 DKA with diuretic use  -DKA mgmt as below  -K+ elevated in setting of ESRD, dialyzed yesterday  #Endo- admitted for DKA. FS very labile. Non-compliant w insulin pump.   - yesterday with dec FS and neg BHB, transitioned to lantus 9, premeal 3U and ISS with diet. Inc glucose on BMP this AM with BHB 1.1, restarted gtt and made NPO, AG inc from 20 to 23  - Insulin drip @0.5u/h. FS q1h, BMP q4 follow DKA protocol - NPO   #ID- Currently no infectious process identified- no leukocytosis/fever/S+S  #DVT ppx- hep sc

## 2018-07-06 LAB
ALBUMIN SERPL ELPH-MCNC: 4 G/DL — SIGNIFICANT CHANGE UP (ref 3.3–5)
ALP SERPL-CCNC: 137 U/L — HIGH (ref 40–120)
ALT FLD-CCNC: 6 U/L — LOW (ref 10–45)
ANION GAP SERPL CALC-SCNC: 19 MMOL/L — HIGH (ref 5–17)
APTT BLD: 28.6 SEC — SIGNIFICANT CHANGE UP (ref 27.5–37.4)
AST SERPL-CCNC: 14 U/L — SIGNIFICANT CHANGE UP (ref 10–40)
BASOPHILS # BLD AUTO: 0.02 K/UL — SIGNIFICANT CHANGE UP (ref 0–0.2)
BASOPHILS NFR BLD AUTO: 0.4 % — SIGNIFICANT CHANGE UP (ref 0–2)
BILIRUB SERPL-MCNC: 0.3 MG/DL — SIGNIFICANT CHANGE UP (ref 0.2–1.2)
BLD GP AB SCN SERPL QL: NEGATIVE — SIGNIFICANT CHANGE UP
BUN SERPL-MCNC: 47 MG/DL — HIGH (ref 7–23)
CALCIUM SERPL-MCNC: 7.5 MG/DL — LOW (ref 8.4–10.5)
CHLORIDE SERPL-SCNC: 91 MMOL/L — LOW (ref 96–108)
CO2 SERPL-SCNC: 24 MMOL/L — SIGNIFICANT CHANGE UP (ref 22–31)
CREAT SERPL-MCNC: 6.48 MG/DL — HIGH (ref 0.5–1.3)
EOSINOPHIL # BLD AUTO: 0.13 K/UL — SIGNIFICANT CHANGE UP (ref 0–0.5)
EOSINOPHIL NFR BLD AUTO: 2.8 % — SIGNIFICANT CHANGE UP (ref 0–6)
GLUCOSE BLDC GLUCOMTR-MCNC: 103 MG/DL — HIGH (ref 70–99)
GLUCOSE BLDC GLUCOMTR-MCNC: 107 MG/DL — HIGH (ref 70–99)
GLUCOSE BLDC GLUCOMTR-MCNC: 110 MG/DL — HIGH (ref 70–99)
GLUCOSE BLDC GLUCOMTR-MCNC: 119 MG/DL — HIGH (ref 70–99)
GLUCOSE BLDC GLUCOMTR-MCNC: 184 MG/DL — HIGH (ref 70–99)
GLUCOSE BLDC GLUCOMTR-MCNC: 187 MG/DL — HIGH (ref 70–99)
GLUCOSE BLDC GLUCOMTR-MCNC: 246 MG/DL — HIGH (ref 70–99)
GLUCOSE SERPL-MCNC: 228 MG/DL — HIGH (ref 70–99)
HCT VFR BLD CALC: 33.9 % — LOW (ref 34.5–45)
HGB BLD-MCNC: 10.5 G/DL — LOW (ref 11.5–15.5)
IMM GRANULOCYTES NFR BLD AUTO: 0.2 % — SIGNIFICANT CHANGE UP (ref 0–1.5)
INR BLD: 0.96 RATIO — SIGNIFICANT CHANGE UP (ref 0.88–1.16)
LYMPHOCYTES # BLD AUTO: 1.2 K/UL — SIGNIFICANT CHANGE UP (ref 1–3.3)
LYMPHOCYTES # BLD AUTO: 25.9 % — SIGNIFICANT CHANGE UP (ref 13–44)
MAGNESIUM SERPL-MCNC: 2.1 MG/DL — SIGNIFICANT CHANGE UP (ref 1.6–2.6)
MCHC RBC-ENTMCNC: 30.3 PG — SIGNIFICANT CHANGE UP (ref 27–34)
MCHC RBC-ENTMCNC: 31 GM/DL — LOW (ref 32–36)
MCV RBC AUTO: 98 FL — SIGNIFICANT CHANGE UP (ref 80–100)
MONOCYTES # BLD AUTO: 0.6 K/UL — SIGNIFICANT CHANGE UP (ref 0–0.9)
MONOCYTES NFR BLD AUTO: 12.9 % — SIGNIFICANT CHANGE UP (ref 2–14)
NEUTROPHILS # BLD AUTO: 2.68 K/UL — SIGNIFICANT CHANGE UP (ref 1.8–7.4)
NEUTROPHILS NFR BLD AUTO: 57.8 % — SIGNIFICANT CHANGE UP (ref 43–77)
PHOSPHATE SERPL-MCNC: 6.6 MG/DL — HIGH (ref 2.5–4.5)
PLATELET # BLD AUTO: 257 K/UL — SIGNIFICANT CHANGE UP (ref 150–400)
POTASSIUM SERPL-MCNC: 5 MMOL/L — SIGNIFICANT CHANGE UP (ref 3.5–5.3)
POTASSIUM SERPL-SCNC: 5 MMOL/L — SIGNIFICANT CHANGE UP (ref 3.5–5.3)
PROT SERPL-MCNC: 6.8 G/DL — SIGNIFICANT CHANGE UP (ref 6–8.3)
PROTHROM AB SERPL-ACNC: 10.8 SEC — SIGNIFICANT CHANGE UP (ref 10–13.1)
RBC # BLD: 3.46 M/UL — LOW (ref 3.8–5.2)
RBC # FLD: 14.6 % — HIGH (ref 10.3–14.5)
RH IG SCN BLD-IMP: POSITIVE — SIGNIFICANT CHANGE UP
SODIUM SERPL-SCNC: 134 MMOL/L — LOW (ref 135–145)
WBC # BLD: 4.64 K/UL — SIGNIFICANT CHANGE UP (ref 3.8–10.5)
WBC # FLD AUTO: 4.64 K/UL — SIGNIFICANT CHANGE UP (ref 3.8–10.5)

## 2018-07-06 PROCEDURE — 99232 SBSQ HOSP IP/OBS MODERATE 35: CPT | Mod: GC

## 2018-07-06 RX ORDER — HYDRALAZINE HCL 50 MG
50 TABLET ORAL EVERY 8 HOURS
Qty: 0 | Refills: 0 | Status: DISCONTINUED | OUTPATIENT
Start: 2018-07-06 | End: 2018-07-09

## 2018-07-06 RX ORDER — INSULIN GLARGINE 100 [IU]/ML
9 INJECTION, SOLUTION SUBCUTANEOUS AT BEDTIME
Qty: 0 | Refills: 0 | Status: DISCONTINUED | OUTPATIENT
Start: 2018-07-06 | End: 2018-07-09

## 2018-07-06 RX ORDER — SODIUM CHLORIDE 9 MG/ML
1000 INJECTION INTRAMUSCULAR; INTRAVENOUS; SUBCUTANEOUS
Qty: 0 | Refills: 0 | Status: DISCONTINUED | OUTPATIENT
Start: 2018-07-06 | End: 2018-07-09

## 2018-07-06 RX ORDER — OXYCODONE HYDROCHLORIDE 5 MG/1
2.5 TABLET ORAL ONCE
Qty: 0 | Refills: 0 | Status: DISCONTINUED | OUTPATIENT
Start: 2018-07-06 | End: 2018-07-06

## 2018-07-06 RX ADMIN — Medication 1 DROP(S): at 06:09

## 2018-07-06 RX ADMIN — Medication 3 UNIT(S): at 19:15

## 2018-07-06 RX ADMIN — Medication 1: at 12:25

## 2018-07-06 RX ADMIN — Medication 1 DROP(S): at 14:01

## 2018-07-06 RX ADMIN — OXYCODONE HYDROCHLORIDE 2.5 MILLIGRAM(S): 5 TABLET ORAL at 07:57

## 2018-07-06 RX ADMIN — OXYCODONE HYDROCHLORIDE 2.5 MILLIGRAM(S): 5 TABLET ORAL at 06:57

## 2018-07-06 RX ADMIN — Medication 1 DROP(S): at 23:13

## 2018-07-06 RX ADMIN — Medication 1: at 19:14

## 2018-07-06 RX ADMIN — Medication 81 MILLIGRAM(S): at 18:08

## 2018-07-06 RX ADMIN — Medication 1 DROP(S): at 14:02

## 2018-07-06 RX ADMIN — CYCLOPENTOLATE HYDROCHLORIDE 1 DROP(S): 10 SOLUTION/ DROPS OPHTHALMIC at 11:13

## 2018-07-06 RX ADMIN — Medication 1 DROP(S): at 23:12

## 2018-07-06 RX ADMIN — INSULIN GLARGINE 9 UNIT(S): 100 INJECTION, SOLUTION SUBCUTANEOUS at 23:22

## 2018-07-06 RX ADMIN — Medication 2: at 08:31

## 2018-07-06 RX ADMIN — Medication 25 MILLIGRAM(S): at 11:08

## 2018-07-06 RX ADMIN — SODIUM CHLORIDE 50 MILLILITER(S): 9 INJECTION INTRAMUSCULAR; INTRAVENOUS; SUBCUTANEOUS at 18:10

## 2018-07-06 RX ADMIN — HEPARIN SODIUM 5000 UNIT(S): 5000 INJECTION INTRAVENOUS; SUBCUTANEOUS at 18:08

## 2018-07-06 RX ADMIN — LISINOPRIL 40 MILLIGRAM(S): 2.5 TABLET ORAL at 06:09

## 2018-07-06 RX ADMIN — Medication 50 MILLIGRAM(S): at 23:12

## 2018-07-06 RX ADMIN — Medication 1 DROP(S): at 23:21

## 2018-07-06 RX ADMIN — Medication 1 DROP(S): at 06:11

## 2018-07-06 RX ADMIN — Medication 25 MILLIGRAM(S): at 23:12

## 2018-07-06 RX ADMIN — ATORVASTATIN CALCIUM 20 MILLIGRAM(S): 80 TABLET, FILM COATED ORAL at 23:11

## 2018-07-06 RX ADMIN — CLOPIDOGREL BISULFATE 75 MILLIGRAM(S): 75 TABLET, FILM COATED ORAL at 23:12

## 2018-07-06 NOTE — PROGRESS NOTE ADULT - ASSESSMENT
63yo F h/o DM1 (has insulin pump, non-adherent) w frequent hospitalizations (last 05/2018) for DKA, ESRD on HD (LUE AVF, M/T/Th/Sa, last HD this morning), CAD s/p  stents, HTN HLD, CVA, PVD presents from home w nausea, anorexia, and abdominal pain. Found to be in DKA and admitted to MICU.  Now stable, tolearting PO.  Wants to be transitioned back to insulin pump.    For now will keep on basal bolus and will receive her basal dose at bedtime today, can be transitioned to the insulin pump tomorrow.

## 2018-07-06 NOTE — BRIEF OPERATIVE NOTE - PROCEDURE
<<-----Click on this checkbox to enter Procedure Fistulogram for arteriovenous shunt with arterial and venous access  07/06/2018  Fistulogram with balloon fistuloplasty  Active  EMMETT

## 2018-07-06 NOTE — PROGRESS NOTE ADULT - PROBLEM SELECTOR PLAN 1
-Continue Lantus 9 units at bedtime today  -Can start Humalog 3 units before meals once she resumes PO intake after her procedure.   -Will restart insulin pump tomorrow

## 2018-07-06 NOTE — PROGRESS NOTE ADULT - SUBJECTIVE AND OBJECTIVE BOX
Vascular Surgery Progress Note (p [id number])    SUBJECTIVE:  The patient was seen and examined. No acute events overnight. Was transferred from MICU to floor. No complaints. Has been NPO.    OBJECTIVE:     ** VITAL SIGNS / I&O's **    Vital Signs Last 24 Hrs  T(C): 36.9 (05 Jul 2018 23:34), Max: 36.9 (05 Jul 2018 11:00)  T(F): 98.5 (05 Jul 2018 23:34), Max: 98.5 (05 Jul 2018 23:34)  HR: 73 (06 Jul 2018 06:12) (66 - 81)  BP: 151/69 (06 Jul 2018 06:12) (104/61 - 158/78)  BP(mean): 96 (05 Jul 2018 16:00) (77 - 106)  RR: 18 (05 Jul 2018 23:34) (15 - 27)  SpO2: 97% (05 Jul 2018 23:34) (94% - 100%)      05 Jul 2018 07:01  -  06 Jul 2018 07:00  --------------------------------------------------------  IN:    Oral Fluid: 300 mL  Total IN: 300 mL    OUT:    Voided: 50 mL  Total OUT: 50 mL    Total NET: 250 mL          ** PHYSICAL EXAM **    -- CONSTITUTIONAL: Alert, NAD  -- PULMONARY: non-labored respirations on room air  -- EXTREMITIES: LUE warm, AVF pulsatile w/ thrill. Palpable radial pulse, no numbness/weakness of L hand.    ** LABS **                          13.2   6.6   )-----------( 159      ( 05 Jul 2018 00:48 )             36.6     06 Jul 2018 07:26    134    |  91     |  47     ----------------------------<  228    5.0     |  24     |  6.48     Ca    7.5        06 Jul 2018 07:26  Phos  6.6       06 Jul 2018 07:26  Mg     2.1       06 Jul 2018 07:26    TPro  6.8    /  Alb  4.0    /  TBili  0.3    /  DBili  x      /  AST  14     /  ALT  6      /  AlkPhos  137    06 Jul 2018 07:26      CAPILLARY BLOOD GLUCOSE      POCT Blood Glucose.: 246 mg/dL (06 Jul 2018 08:25)  POCT Blood Glucose.: 183 mg/dL (05 Jul 2018 21:51)  POCT Blood Glucose.: 221 mg/dL (05 Jul 2018 17:06)  POCT Blood Glucose.: 321 mg/dL (05 Jul 2018 13:00)  POCT Blood Glucose.: 298 mg/dL (05 Jul 2018 11:23)        LIVER FUNCTIONS - ( 06 Jul 2018 07:26 )  Alb: 4.0 g/dL / Pro: 6.8 g/dL / ALK PHOS: 137 U/L / ALT: 6 U/L / AST: 14 U/L / GGT: x                 MEDICATIONS  (STANDING):  aspirin enteric coated 81 milliGRAM(s) Oral daily  atorvastatin 20 milliGRAM(s) Oral at bedtime  clopidogrel Tablet 75 milliGRAM(s) Oral at bedtime  cyclopentolate 2% Solution 1 Drop(s) Both EYES daily  dexamethasone/neomycin/polymyxin Suspension 1 Drop(s) Left EYE every 8 hours  dextrose 5%. 1000 milliLiter(s) (50 mL/Hr) IV Continuous <Continuous>  dextrose 5%. 1000 milliLiter(s) (50 mL/Hr) IV Continuous <Continuous>  dextrose 50% Injectable 12.5 Gram(s) IV Push once  dextrose 50% Injectable 25 Gram(s) IV Push once  dextrose 50% Injectable 25 Gram(s) IV Push once  dextrose 50% Injectable 12.5 Gram(s) IV Push once  dextrose 50% Injectable 25 Gram(s) IV Push once  dextrose 50% Injectable 25 Gram(s) IV Push once  heparin  Injectable 5000 Unit(s) SubCutaneous every 12 hours  insulin lispro (HumaLOG) corrective regimen sliding scale   SubCutaneous three times a day before meals  insulin lispro (HumaLOG) corrective regimen sliding scale   SubCutaneous at bedtime  insulin lispro Injectable (HumaLOG) 3 Unit(s) SubCutaneous three times a day before meals  ketorolac 0.5% Ophthalmic Solution 1 Drop(s) Both EYES three times a day  lisinopril 40 milliGRAM(s) Oral daily  metoprolol tartrate 25 milliGRAM(s) Oral two times a day  prednisoLONE acetate 1% Suspension 1 Drop(s) Both EYES three times a day    MEDICATIONS  (PRN):  dextrose 40% Gel 15 Gram(s) Oral once PRN Blood Glucose LESS THAN 70 milliGRAM(s)/deciLiter  dextrose 40% Gel 15 Gram(s) Oral once PRN Blood Glucose LESS THAN 70 milliGRAM(s)/deciliter  glucagon  Injectable 1 milliGRAM(s) IntraMuscular once PRN Glucose <70 milliGRAM(s)/deciLiter  glucagon  Injectable 1 milliGRAM(s) IntraMuscular once PRN Glucose LESS THAN 70 milligrams/deciliter

## 2018-07-06 NOTE — PROGRESS NOTE ADULT - SUBJECTIVE AND OBJECTIVE BOX
History:  No acute overnight events. She was awaiting receiving her AV fistula repair when I saw her.   She has been NPO all day and received Lantus 9 units the night before.           MEDICATIONS  (STANDING):  aspirin enteric coated 81 milliGRAM(s) Oral daily  atorvastatin 20 milliGRAM(s) Oral at bedtime  clopidogrel Tablet 75 milliGRAM(s) Oral at bedtime  cyclopentolate 2% Solution 1 Drop(s) Both EYES daily  dexamethasone/neomycin/polymyxin Suspension 1 Drop(s) Left EYE every 8 hours  dextrose 5%. 1000 milliLiter(s) (50 mL/Hr) IV Continuous <Continuous>  dextrose 5%. 1000 milliLiter(s) (50 mL/Hr) IV Continuous <Continuous>  dextrose 50% Injectable 12.5 Gram(s) IV Push once  dextrose 50% Injectable 25 Gram(s) IV Push once  dextrose 50% Injectable 25 Gram(s) IV Push once  dextrose 50% Injectable 12.5 Gram(s) IV Push once  dextrose 50% Injectable 25 Gram(s) IV Push once  dextrose 50% Injectable 25 Gram(s) IV Push once  heparin  Injectable 5000 Unit(s) SubCutaneous every 12 hours  hydrALAZINE 50 milliGRAM(s) Oral every 8 hours  insulin lispro (HumaLOG) corrective regimen sliding scale   SubCutaneous three times a day before meals  insulin lispro (HumaLOG) corrective regimen sliding scale   SubCutaneous at bedtime  insulin lispro Injectable (HumaLOG) 3 Unit(s) SubCutaneous three times a day before meals  ketorolac 0.5% Ophthalmic Solution 1 Drop(s) Both EYES three times a day  lisinopril 40 milliGRAM(s) Oral daily  metoprolol tartrate 25 milliGRAM(s) Oral two times a day  prednisoLONE acetate 1% Suspension 1 Drop(s) Both EYES three times a day    MEDICATIONS  (PRN):  dextrose 40% Gel 15 Gram(s) Oral once PRN Blood Glucose LESS THAN 70 milliGRAM(s)/deciLiter  dextrose 40% Gel 15 Gram(s) Oral once PRN Blood Glucose LESS THAN 70 milliGRAM(s)/deciliter  glucagon  Injectable 1 milliGRAM(s) IntraMuscular once PRN Glucose <70 milliGRAM(s)/deciLiter  glucagon  Injectable 1 milliGRAM(s) IntraMuscular once PRN Glucose LESS THAN 70 milligrams/deciliter      Allergies  No Known Allergies        Review of Systems:  Constitutional: No fever  Eyes: No blurry vision  Neuro: No tremors  HEENT: No pain  Cardiovascular: No chest pain, palpitations  Respiratory: No SOB, no cough  GI: No nausea, vomiting, abdominal pain  : No dysuria  Skin: no rash  Psych: no depression  Endocrine: no polyuria, polydipsia      ALL OTHER SYSTEMS REVIEWED AND NEGATIVE      PHYSICAL EXAM:  VITALS: T(C): 36.4 (07-06-18 @ 09:29)  T(F): 97.6 (07-06-18 @ 09:29), Max: 98.5 (07-05-18 @ 23:34)  HR: 67 (07-06-18 @ 11:00) (67 - 80)  BP: 147/76 (07-06-18 @ 11:00) (126/69 - 151/69)  RR:  (17 - 23)  SpO2:  (94% - 99%)    GENERAL: NAD, thin elderly female   EYES: No proptosis, no lid lag, anicteric  HEENT:  Atraumatic, Normocephalic, moist mucous membranes  THYROID: Normal size, no palpable nodules  RESPIRATORY: Clear to auscultation bilaterally; No rales, rhonchi, wheezing, or rubs  CARDIOVASCULAR: Regular rate and rhythm; No murmurs; no peripheral edema  GI: Soft, nontender, non distended, normal bowel sounds  SKIN: Dry, intact, No rashes or lesions  MUSCULOSKELETAL: Full range of motion, normal strength  NEURO: sensation intact, extraocular movements intact, no tremor, normal reflexes  PSYCH: Alert and oriented x 3, normal affect, normal mood      POCT Blood Glucose.: 107 mg/dL (07-06-18 @ 15:00)  POCT Blood Glucose.: 184 mg/dL (07-06-18 @ 12:11)  POCT Blood Glucose.: 246 mg/dL (07-06-18 @ 08:25)  POCT Blood Glucose.: 183 mg/dL (07-05-18 @ 21:51)  POCT Blood Glucose.: 221 mg/dL (07-05-18 @ 17:06)  POCT Blood Glucose.: 321 mg/dL (07-05-18 @ 13:00)  POCT Blood Glucose.: 298 mg/dL (07-05-18 @ 11:23)  POCT Blood Glucose.: 284 mg/dL (07-05-18 @ 08:39)  POCT Blood Glucose.: 156 mg/dL (07-04-18 @ 21:38)  POCT Blood Glucose.: 181 mg/dL (07-04-18 @ 16:20)  POCT Blood Glucose.: 194 mg/dL (07-04-18 @ 13:31)  POCT Blood Glucose.: 157 mg/dL (07-04-18 @ 12:15)  POCT Blood Glucose.: 177 mg/dL (07-04-18 @ 11:23)  POCT Blood Glucose.: 177 mg/dL (07-04-18 @ 10:07)  POCT Blood Glucose.: 217 mg/dL (07-04-18 @ 09:06)  POCT Blood Glucose.: 231 mg/dL (07-04-18 @ 08:13)  POCT Blood Glucose.: 237 mg/dL (07-04-18 @ 07:17)  POCT Blood Glucose.: 238 mg/dL (07-04-18 @ 06:00)  POCT Blood Glucose.: 253 mg/dL (07-04-18 @ 05:02)  POCT Blood Glucose.: 255 mg/dL (07-04-18 @ 04:00)  POCT Blood Glucose.: 310 mg/dL (07-04-18 @ 03:00)  POCT Blood Glucose.: >600 mg/dL (07-04-18 @ 02:05)  POCT Blood Glucose.: >600 mg/dL (07-04-18 @ 02:04)  POCT Blood Glucose.: 231 mg/dL (07-04-18 @ 01:04)  POCT Blood Glucose.: 164 mg/dL (07-04-18 @ 00:01)  POCT Blood Glucose.: 147 mg/dL (07-03-18 @ 23:05)  POCT Blood Glucose.: 145 mg/dL (07-03-18 @ 22:03)  POCT Blood Glucose.: 259 mg/dL (07-03-18 @ 21:04)  POCT Blood Glucose.: 416 mg/dL (07-03-18 @ 19:59)  POCT Blood Glucose.: 461 mg/dL (07-03-18 @ 19:11)  POCT Blood Glucose.: 500 mg/dL (07-03-18 @ 19:09)  POCT Blood Glucose.: 496 mg/dL (07-03-18 @ 18:07)  POCT Blood Glucose.: 428 mg/dL (07-03-18 @ 17:28)  POCT Blood Glucose.: 425 mg/dL (07-03-18 @ 16:17)      07-06    134<L>  |  91<L>  |  47<H>  ----------------------------<  228<H>  5.0   |  24  |  6.48<H>    EGFR if : 7<L>  EGFR if non : 6<L>    Ca    7.5<L>      07-06  Mg     2.1     07-06  Phos  6.6     07-06    TPro  6.8  /  Alb  4.0  /  TBili  0.3  /  DBili  x   /  AST  14  /  ALT  6<L>  /  AlkPhos  137<H>  07-06          Thyroid Function Tests:      Hemoglobin A1C, Whole Blood: 7.2 % <H> [4.0 - 5.6] (07-03-18 @ 05:01)

## 2018-07-06 NOTE — PROGRESS NOTE ADULT - ASSESSMENT
62 f with  DKA resolved- continue insulin, endocrine follow  ESRD- HD  s/p AVF reevaluation by vascular.  CAD stable  Vaginal bleeding resolved- Gyn evaluation as OTP  DCP   Pierce Viera MD pager [phone]

## 2018-07-06 NOTE — PROGRESS NOTE ADULT - ASSESSMENT
61y/o F w/ ESRD on HD via LUE AVF. Admitted for DKA and hyperkalemia requiring urgent HD. Nephrology consulting vascular surgery for evaluation of the fistula. There is concern for outflow stenosis. Plan for fistulogram    PLAN:   - NPO for procedure  - LUE AV fistulogram possible fistuloplasty today  - consent in chart  - please document medical optimization    Jesse Willoughby MD PGY2  p [phone]

## 2018-07-06 NOTE — PROGRESS NOTE ADULT - SUBJECTIVE AND OBJECTIVE BOX
[facility]   [phone]  DIALYSIS NOTE  Chief Complaint: ESRD/Ongoing hemodialysis requirement.  24 hour events/subjective:    seen earlier. no new c/o by pt         ALLERGIES & MEDICATIONS  --------------------------------------------------------------------------------  Allergies    No Known Allergies    Intolerances      Standing Inpatient Medications  aspirin enteric coated 81 milliGRAM(s) Oral daily  atorvastatin 20 milliGRAM(s) Oral at bedtime  clopidogrel Tablet 75 milliGRAM(s) Oral at bedtime  cyclopentolate 2% Solution 1 Drop(s) Both EYES daily  dexamethasone/neomycin/polymyxin Suspension 1 Drop(s) Left EYE every 8 hours  dextrose 5%. 1000 milliLiter(s) IV Continuous <Continuous>  dextrose 5%. 1000 milliLiter(s) IV Continuous <Continuous>  dextrose 50% Injectable 12.5 Gram(s) IV Push once  dextrose 50% Injectable 25 Gram(s) IV Push once  dextrose 50% Injectable 25 Gram(s) IV Push once  dextrose 50% Injectable 12.5 Gram(s) IV Push once  dextrose 50% Injectable 25 Gram(s) IV Push once  dextrose 50% Injectable 25 Gram(s) IV Push once  heparin  Injectable 5000 Unit(s) SubCutaneous every 12 hours  hydrALAZINE 50 milliGRAM(s) Oral every 8 hours  insulin lispro (HumaLOG) corrective regimen sliding scale   SubCutaneous three times a day before meals  insulin lispro (HumaLOG) corrective regimen sliding scale   SubCutaneous at bedtime  insulin lispro Injectable (HumaLOG) 3 Unit(s) SubCutaneous three times a day before meals  ketorolac 0.5% Ophthalmic Solution 1 Drop(s) Both EYES three times a day  lisinopril 40 milliGRAM(s) Oral daily  metoprolol tartrate 25 milliGRAM(s) Oral two times a day  prednisoLONE acetate 1% Suspension 1 Drop(s) Both EYES three times a day    PRN Inpatient Medications  dextrose 40% Gel 15 Gram(s) Oral once PRN  dextrose 40% Gel 15 Gram(s) Oral once PRN  glucagon  Injectable 1 milliGRAM(s) IntraMuscular once PRN  glucagon  Injectable 1 milliGRAM(s) IntraMuscular once PRN      REVIEW OF SYSTEMS  --------------------------------------------------------------------------------  no itching or rash  no fever or chill  no cp or palp   no sob or cough   no N/V/D/ no abd pain   ext no edema no pain         VITALS/PHYSICAL EXAM  --------------------------------------------------------------------------------  T(C): 36.4 (07-06-18 @ 09:29), Max: 36.9 (07-05-18 @ 23:34)  HR: 67 (07-06-18 @ 11:00) (66 - 80)  BP: 147/76 (07-06-18 @ 11:00) (126/69 - 151/69)  RR: 18 (07-06-18 @ 09:29) (15 - 23)  SpO2: 98% (07-06-18 @ 09:29) (94% - 100%)  Wt(kg): --        07-05-18 @ 07:01  -  07-06-18 @ 07:00  --------------------------------------------------------  IN: 300 mL / OUT: 50 mL / NET: 250 mL      Physical Exam:  		    	Gen: alert oriented place person and date   	Pulm: Decreased breath sounds b/l bases no rales or ronchi  	CV: RRR, S1/S2  	Abd: +BS, soft, nontender/nondistended  	Extremity: No cyanosis, no edema no clubbing  	Neuro: No focal deficits  	    LABS/STUDIES  --------------------------------------------------------------------------------              10.5   4.64  >-----------<  257      [07-06-18 @ 09:47]              33.9     134  |  91  |  47  ----------------------------<  228      [07-06-18 @ 07:26]  5.0   |  24  |  6.48        Ca     7.5     [07-06-18 @ 07:26]      Mg     2.1     [07-06-18 @ 07:26]      Phos  6.6     [07-06-18 @ 07:26]    TPro  6.8  /  Alb  4.0  /  TBili  0.3  /  DBili  x   /  AST  14  /  ALT  6   /  AlkPhos  137  [07-06-18 @ 07:26]    PT/INR: PT 10.8 , INR 0.96       [07-06-18 @ 09:47]  PTT: 28.6       [07-06-18 @ 09:47]              imp/suggest: ESRD    hd after avf angiogram   Hemodialysis Prescription:  	Access: avf  	Dialyzer: revaclear 300  	Blood Flow (mL/Min): 400  	Dialysate Flow (mL/Min): 600  	Target UF (Liters): 1 liter   	Treatment Time:3.5 ghr   	Potassium: 2k   	Calcium: 2.5  	  YOLANDA    Vitamin D     continue with hd   see hd flow sheet

## 2018-07-06 NOTE — PROGRESS NOTE ADULT - SUBJECTIVE AND OBJECTIVE BOX
Patient is a 62y old  Female who presents with a chief complaint of DKA (03 Jul 2018 02:05)      SUBJECTIVE / OVERNIGHT EVENTS: No new complaints. s/p Fistulogram.   Review of Systems  chest pain no  palpitations no  sob no  nausea no  headache no    MEDICATIONS  (STANDING):  aspirin enteric coated 81 milliGRAM(s) Oral daily  atorvastatin 20 milliGRAM(s) Oral at bedtime  clopidogrel Tablet 75 milliGRAM(s) Oral at bedtime  cyclopentolate 2% Solution 1 Drop(s) Both EYES daily  dexamethasone/neomycin/polymyxin Suspension 1 Drop(s) Left EYE every 8 hours  dextrose 5%. 1000 milliLiter(s) (50 mL/Hr) IV Continuous <Continuous>  dextrose 5%. 1000 milliLiter(s) (50 mL/Hr) IV Continuous <Continuous>  dextrose 50% Injectable 12.5 Gram(s) IV Push once  dextrose 50% Injectable 25 Gram(s) IV Push once  dextrose 50% Injectable 25 Gram(s) IV Push once  dextrose 50% Injectable 12.5 Gram(s) IV Push once  dextrose 50% Injectable 25 Gram(s) IV Push once  dextrose 50% Injectable 25 Gram(s) IV Push once  heparin  Injectable 5000 Unit(s) SubCutaneous every 12 hours  hydrALAZINE 50 milliGRAM(s) Oral every 8 hours  insulin glargine Injectable (LANTUS) 9 Unit(s) SubCutaneous at bedtime  insulin lispro (HumaLOG) corrective regimen sliding scale   SubCutaneous three times a day before meals  insulin lispro (HumaLOG) corrective regimen sliding scale   SubCutaneous at bedtime  insulin lispro Injectable (HumaLOG) 3 Unit(s) SubCutaneous three times a day before meals  ketorolac 0.5% Ophthalmic Solution 1 Drop(s) Both EYES three times a day  lisinopril 40 milliGRAM(s) Oral daily  metoprolol tartrate 25 milliGRAM(s) Oral two times a day  prednisoLONE acetate 1% Suspension 1 Drop(s) Both EYES three times a day  sodium chloride 0.9%. 1000 milliLiter(s) (50 mL/Hr) IV Continuous <Continuous>    MEDICATIONS  (PRN):  dextrose 40% Gel 15 Gram(s) Oral once PRN Blood Glucose LESS THAN 70 milliGRAM(s)/deciLiter  dextrose 40% Gel 15 Gram(s) Oral once PRN Blood Glucose LESS THAN 70 milliGRAM(s)/deciliter  glucagon  Injectable 1 milliGRAM(s) IntraMuscular once PRN Glucose <70 milliGRAM(s)/deciLiter  glucagon  Injectable 1 milliGRAM(s) IntraMuscular once PRN Glucose LESS THAN 70 milligrams/deciliter          PHYSICAL EXAM:  GENERAL: NAD, well-developed  HEAD:  Atraumatic, Normocephalic  EYES: EOMI, PERRLA, conjunctiva and sclera clear  NECK: Supple, No JVD  CHEST/LUNG: Clear to auscultation bilaterally; No wheeze  HEART: Regular rate and rhythm; No murmurs, rubs, or gallops  ABDOMEN: Soft, Nontender, Nondistended; Bowel sounds present  EXTREMITIES:  2+ Peripheral Pulses, No clubbing, cyanosis, or edema  PSYCH: AAOx3  NEUROLOGY: non-focal  SKIN: No rashes or lesions    LABS:                        10.5   4.64  )-----------( 257      ( 06 Jul 2018 09:47 )             33.9     07-06    134<L>  |  91<L>  |  47<H>  ----------------------------<  228<H>  5.0   |  24  |  6.48<H>    Ca    7.5<L>      06 Jul 2018 07:26  Phos  6.6     07-06  Mg     2.1     07-06    TPro  6.8  /  Alb  4.0  /  TBili  0.3  /  DBili  x   /  AST  14  /  ALT  6<L>  /  AlkPhos  137<H>  07-06    PT/INR - ( 06 Jul 2018 09:47 )   PT: 10.8 sec;   INR: 0.96 ratio         PTT - ( 06 Jul 2018 09:47 )  PTT:28.6 sec          RADIOLOGY & ADDITIONAL TESTS:    Imaging Personally Reviewed:    Consultant(s) Notes Reviewed:      Care Discussed with Consultants/Other Providers:

## 2018-07-07 DIAGNOSIS — N18.6 END STAGE RENAL DISEASE: ICD-10-CM

## 2018-07-07 LAB
ANION GAP SERPL CALC-SCNC: 20 MMOL/L — HIGH (ref 5–17)
BUN SERPL-MCNC: 21 MG/DL — SIGNIFICANT CHANGE UP (ref 7–23)
CALCIUM SERPL-MCNC: 7.8 MG/DL — LOW (ref 8.4–10.5)
CHLORIDE SERPL-SCNC: 91 MMOL/L — LOW (ref 96–108)
CO2 SERPL-SCNC: 24 MMOL/L — SIGNIFICANT CHANGE UP (ref 22–31)
CREAT SERPL-MCNC: 4.33 MG/DL — HIGH (ref 0.5–1.3)
GLUCOSE BLDC GLUCOMTR-MCNC: 194 MG/DL — HIGH (ref 70–99)
GLUCOSE BLDC GLUCOMTR-MCNC: 205 MG/DL — HIGH (ref 70–99)
GLUCOSE BLDC GLUCOMTR-MCNC: 321 MG/DL — HIGH (ref 70–99)
GLUCOSE BLDC GLUCOMTR-MCNC: 449 MG/DL — HIGH (ref 70–99)
GLUCOSE BLDC GLUCOMTR-MCNC: 457 MG/DL — CRITICAL HIGH (ref 70–99)
GLUCOSE SERPL-MCNC: 248 MG/DL — HIGH (ref 70–99)
POTASSIUM SERPL-MCNC: 4.7 MMOL/L — SIGNIFICANT CHANGE UP (ref 3.5–5.3)
POTASSIUM SERPL-SCNC: 4.7 MMOL/L — SIGNIFICANT CHANGE UP (ref 3.5–5.3)
SODIUM SERPL-SCNC: 135 MMOL/L — SIGNIFICANT CHANGE UP (ref 135–145)

## 2018-07-07 RX ORDER — OXYCODONE HYDROCHLORIDE 5 MG/1
2.5 TABLET ORAL ONCE
Qty: 0 | Refills: 0 | Status: DISCONTINUED | OUTPATIENT
Start: 2018-07-07 | End: 2018-07-07

## 2018-07-07 RX ADMIN — Medication 81 MILLIGRAM(S): at 12:21

## 2018-07-07 RX ADMIN — Medication 3 UNIT(S): at 08:31

## 2018-07-07 RX ADMIN — LISINOPRIL 40 MILLIGRAM(S): 2.5 TABLET ORAL at 05:41

## 2018-07-07 RX ADMIN — Medication 25 MILLIGRAM(S): at 22:11

## 2018-07-07 RX ADMIN — OXYCODONE HYDROCHLORIDE 2.5 MILLIGRAM(S): 5 TABLET ORAL at 02:29

## 2018-07-07 RX ADMIN — Medication 1 DROP(S): at 13:52

## 2018-07-07 RX ADMIN — Medication 1 DROP(S): at 22:00

## 2018-07-07 RX ADMIN — Medication 1 DROP(S): at 05:40

## 2018-07-07 RX ADMIN — OXYCODONE HYDROCHLORIDE 2.5 MILLIGRAM(S): 5 TABLET ORAL at 22:10

## 2018-07-07 RX ADMIN — Medication 50 MILLIGRAM(S): at 05:41

## 2018-07-07 RX ADMIN — Medication 4: at 22:10

## 2018-07-07 RX ADMIN — ATORVASTATIN CALCIUM 20 MILLIGRAM(S): 80 TABLET, FILM COATED ORAL at 22:11

## 2018-07-07 RX ADMIN — Medication 1 DROP(S): at 13:50

## 2018-07-07 RX ADMIN — HEPARIN SODIUM 5000 UNIT(S): 5000 INJECTION INTRAVENOUS; SUBCUTANEOUS at 05:41

## 2018-07-07 RX ADMIN — Medication 1 DROP(S): at 22:10

## 2018-07-07 RX ADMIN — Medication 1 DROP(S): at 05:46

## 2018-07-07 RX ADMIN — Medication 50 MILLIGRAM(S): at 22:11

## 2018-07-07 RX ADMIN — CLOPIDOGREL BISULFATE 75 MILLIGRAM(S): 75 TABLET, FILM COATED ORAL at 22:11

## 2018-07-07 RX ADMIN — Medication 25 MILLIGRAM(S): at 12:22

## 2018-07-07 RX ADMIN — Medication 3 UNIT(S): at 19:15

## 2018-07-07 RX ADMIN — Medication 3 UNIT(S): at 12:23

## 2018-07-07 RX ADMIN — Medication 50 MILLIGRAM(S): at 13:51

## 2018-07-07 RX ADMIN — OXYCODONE HYDROCHLORIDE 2.5 MILLIGRAM(S): 5 TABLET ORAL at 22:20

## 2018-07-07 RX ADMIN — Medication 1: at 08:31

## 2018-07-07 RX ADMIN — Medication 2: at 19:16

## 2018-07-07 RX ADMIN — Medication 4: at 12:22

## 2018-07-07 RX ADMIN — INSULIN GLARGINE 9 UNIT(S): 100 INJECTION, SOLUTION SUBCUTANEOUS at 22:10

## 2018-07-07 NOTE — PROVIDER CONTACT NOTE (MEDICATION) - ASSESSMENT
Pt is asymptomatic. F/s is 457 when rechecked it is was still >400. Pt due now for 9 units of lantus and 4 units per the sliding scale.

## 2018-07-07 NOTE — PROGRESS NOTE ADULT - ASSESSMENT
62 f with  DKA resolved- continue insulin, endocrine follow re Insulin pump.  ESRD- HD  s/p AVF reevaluation by vascular.  CAD stable  Vaginal bleeding resolved- Gyn evaluation as OTP  DC home when cleared by Endocrine service.   Pierce Viera MD pager [phone]

## 2018-07-07 NOTE — PROGRESS NOTE ADULT - ASSESSMENT
63 y/o female with ESRD on HD, admitted with DKA/hyperkalemia requiring urgent HD, now s/p fistulogram with angioplasty

## 2018-07-07 NOTE — PROVIDER CONTACT NOTE (MEDICATION) - ACTION/TREATMENT ORDERED:
Chente will contact endocrine for further management. Awaiting further treatment. Will continue to monitor.

## 2018-07-07 NOTE — PROGRESS NOTE ADULT - SUBJECTIVE AND OBJECTIVE BOX
Vascular Surgery Progress Note (p [id number])    SUBJECTIVE:  The patient was seen and examined this morning during rounds. POD1 s/p LUE fistulogram and fistuloplasty. Denies pain. Doing well.    OBJECTIVE:     ** VITAL SIGNS / I&O's **    Vital Signs Last 24 Hrs  T(C): 36.7 (07 Jul 2018 05:36), Max: 37 (06 Jul 2018 23:09)  T(F): 98 (07 Jul 2018 05:36), Max: 98.6 (06 Jul 2018 23:09)  HR: 64 (07 Jul 2018 05:36) (64 - 78)  BP: 135/65 (07 Jul 2018 05:36) (133/70 - 168/89)  BP(mean): --  RR: 18 (07 Jul 2018 05:36) (17 - 18)  SpO2: 97% (07 Jul 2018 05:36) (97% - 99%)      06 Jul 2018 07:01  -  07 Jul 2018 07:00  --------------------------------------------------------  IN:    Other: 900 mL  Total IN: 900 mL    OUT:    Other: 2600 mL  Total OUT: 2600 mL    Total NET: -1700 mL          ** PHYSICAL EXAM **    -- CONSTITUTIONAL: Alert, NAD, conversant  -- PULMONARY: non-labored respirations on room air  -- EXTREMITIES: LUE warm, AVF w/ palpable thrill. dressing c/d/i, no bleeding or hematoma appreciated, Palpable radial pulse, no numbness/weakness of L hand.    ** LABS **                          10.5   4.64  )-----------( 257      ( 06 Jul 2018 09:47 )             33.9     07 Jul 2018 06:39    135    |  91     |  21     ----------------------------<  248    4.7     |  24     |  4.33     Ca    7.8        07 Jul 2018 06:39  Phos  6.6       06 Jul 2018 07:26  Mg     2.1       06 Jul 2018 07:26    TPro  6.8    /  Alb  4.0    /  TBili  0.3    /  DBili  x      /  AST  14     /  ALT  6      /  AlkPhos  137    06 Jul 2018 07:26    PT/INR - ( 06 Jul 2018 09:47 )   PT: 10.8 sec;   INR: 0.96 ratio         PTT - ( 06 Jul 2018 09:47 )  PTT:28.6 sec  CAPILLARY BLOOD GLUCOSE      POCT Blood Glucose.: 194 mg/dL (07 Jul 2018 07:59)  POCT Blood Glucose.: 103 mg/dL (06 Jul 2018 23:14)  POCT Blood Glucose.: 187 mg/dL (06 Jul 2018 19:08)  POCT Blood Glucose.: 119 mg/dL (06 Jul 2018 17:08)  POCT Blood Glucose.: 110 mg/dL (06 Jul 2018 16:18)  POCT Blood Glucose.: 107 mg/dL (06 Jul 2018 15:00)  POCT Blood Glucose.: 184 mg/dL (06 Jul 2018 12:11)        LIVER FUNCTIONS - ( 06 Jul 2018 07:26 )  Alb: 4.0 g/dL / Pro: 6.8 g/dL / ALK PHOS: 137 U/L / ALT: 6 U/L / AST: 14 U/L / GGT: x                 MEDICATIONS  (STANDING):  aspirin enteric coated 81 milliGRAM(s) Oral daily  atorvastatin 20 milliGRAM(s) Oral at bedtime  clopidogrel Tablet 75 milliGRAM(s) Oral at bedtime  cyclopentolate 2% Solution 1 Drop(s) Both EYES daily  dexamethasone/neomycin/polymyxin Suspension 1 Drop(s) Left EYE every 8 hours  dextrose 5%. 1000 milliLiter(s) (50 mL/Hr) IV Continuous <Continuous>  dextrose 5%. 1000 milliLiter(s) (50 mL/Hr) IV Continuous <Continuous>  dextrose 50% Injectable 12.5 Gram(s) IV Push once  dextrose 50% Injectable 25 Gram(s) IV Push once  dextrose 50% Injectable 25 Gram(s) IV Push once  dextrose 50% Injectable 12.5 Gram(s) IV Push once  dextrose 50% Injectable 25 Gram(s) IV Push once  dextrose 50% Injectable 25 Gram(s) IV Push once  heparin  Injectable 5000 Unit(s) SubCutaneous every 12 hours  hydrALAZINE 50 milliGRAM(s) Oral every 8 hours  insulin glargine Injectable (LANTUS) 9 Unit(s) SubCutaneous at bedtime  insulin lispro (HumaLOG) corrective regimen sliding scale   SubCutaneous three times a day before meals  insulin lispro (HumaLOG) corrective regimen sliding scale   SubCutaneous at bedtime  insulin lispro Injectable (HumaLOG) 3 Unit(s) SubCutaneous three times a day before meals  ketorolac 0.5% Ophthalmic Solution 1 Drop(s) Both EYES three times a day  lisinopril 40 milliGRAM(s) Oral daily  metoprolol tartrate 25 milliGRAM(s) Oral two times a day  prednisoLONE acetate 1% Suspension 1 Drop(s) Both EYES three times a day  sodium chloride 0.9%. 1000 milliLiter(s) (50 mL/Hr) IV Continuous <Continuous>    MEDICATIONS  (PRN):  dextrose 40% Gel 15 Gram(s) Oral once PRN Blood Glucose LESS THAN 70 milliGRAM(s)/deciLiter  dextrose 40% Gel 15 Gram(s) Oral once PRN Blood Glucose LESS THAN 70 milliGRAM(s)/deciliter  glucagon  Injectable 1 milliGRAM(s) IntraMuscular once PRN Glucose <70 milliGRAM(s)/deciLiter  glucagon  Injectable 1 milliGRAM(s) IntraMuscular once PRN Glucose LESS THAN 70 milligrams/deciliter

## 2018-07-07 NOTE — PROVIDER CONTACT NOTE (MEDICATION) - BACKGROUND
Pt had insulin pump removed two days ago. Pt was suppose to have pump reinserted today. Pt received 3units pre meal and 2 units sliding scale before dinner during dialysis

## 2018-07-07 NOTE — PROGRESS NOTE ADULT - ASSESSMENT
63y/o F w/ ESRD on HD via LUE AVF. Admitted for DKA and hyperkalemia requiring urgent HD. S/P fistulogram and fistuloplasty of LUE AVF 7/6.   PLAN:   - may access fistula for HD  - please contact vascular surgery with questions  - can follow up with Dr. Elizalde as outpatient 1-2 weeks after discharge    Jesse Willoughby MD PGY2  p [phone]

## 2018-07-07 NOTE — PROGRESS NOTE ADULT - SUBJECTIVE AND OBJECTIVE BOX
Patient is a 62y old  Female who presents with a chief complaint of DKA (03 Jul 2018 02:05)      SUBJECTIVE / OVERNIGHT EVENTS: Comfortable without new complaints.   Review of Systems  chest pain no  palpitations no  sob no  nausea no  headache no    MEDICATIONS  (STANDING):  aspirin enteric coated 81 milliGRAM(s) Oral daily  atorvastatin 20 milliGRAM(s) Oral at bedtime  clopidogrel Tablet 75 milliGRAM(s) Oral at bedtime  cyclopentolate 2% Solution 1 Drop(s) Both EYES daily  dexamethasone/neomycin/polymyxin Suspension 1 Drop(s) Left EYE every 8 hours  dextrose 5%. 1000 milliLiter(s) (50 mL/Hr) IV Continuous <Continuous>  dextrose 5%. 1000 milliLiter(s) (50 mL/Hr) IV Continuous <Continuous>  dextrose 50% Injectable 12.5 Gram(s) IV Push once  dextrose 50% Injectable 25 Gram(s) IV Push once  dextrose 50% Injectable 25 Gram(s) IV Push once  dextrose 50% Injectable 12.5 Gram(s) IV Push once  dextrose 50% Injectable 25 Gram(s) IV Push once  dextrose 50% Injectable 25 Gram(s) IV Push once  heparin  Injectable 5000 Unit(s) SubCutaneous every 12 hours  hydrALAZINE 50 milliGRAM(s) Oral every 8 hours  insulin glargine Injectable (LANTUS) 9 Unit(s) SubCutaneous at bedtime  insulin lispro (HumaLOG) corrective regimen sliding scale   SubCutaneous three times a day before meals  insulin lispro (HumaLOG) corrective regimen sliding scale   SubCutaneous at bedtime  insulin lispro Injectable (HumaLOG) 3 Unit(s) SubCutaneous three times a day before meals  ketorolac 0.5% Ophthalmic Solution 1 Drop(s) Both EYES three times a day  lisinopril 40 milliGRAM(s) Oral daily  metoprolol tartrate 25 milliGRAM(s) Oral two times a day  prednisoLONE acetate 1% Suspension 1 Drop(s) Both EYES three times a day  sodium chloride 0.9%. 1000 milliLiter(s) (50 mL/Hr) IV Continuous <Continuous>    MEDICATIONS  (PRN):  dextrose 40% Gel 15 Gram(s) Oral once PRN Blood Glucose LESS THAN 70 milliGRAM(s)/deciLiter  dextrose 40% Gel 15 Gram(s) Oral once PRN Blood Glucose LESS THAN 70 milliGRAM(s)/deciliter  glucagon  Injectable 1 milliGRAM(s) IntraMuscular once PRN Glucose <70 milliGRAM(s)/deciLiter  glucagon  Injectable 1 milliGRAM(s) IntraMuscular once PRN Glucose LESS THAN 70 milligrams/deciliter          PHYSICAL EXAM:  GENERAL: NAD, well-developed  HEAD:  Atraumatic, Normocephalic  EYES: EOMI, PERRLA, conjunctiva and sclera clear  NECK: Supple, No JVD  CHEST/LUNG: Clear to auscultation bilaterally; No wheeze  HEART: Regular rate and rhythm; No murmurs, rubs, or gallops  ABDOMEN: Soft, Nontender, Nondistended; Bowel sounds present  EXTREMITIES:  2+ Peripheral Pulses, No clubbing, cyanosis, or edema L arm AVF with thrill.  PSYCH: AAOx3  NEUROLOGY: non-focal  SKIN: No rashes or lesions    LABS:                        10.5   4.64  )-----------( 257      ( 06 Jul 2018 09:47 )             33.9     07-07    135  |  91<L>  |  21  ----------------------------<  248<H>  4.7   |  24  |  4.33<H>    Ca    7.8<L>      07 Jul 2018 06:39  Phos  6.6     07-06  Mg     2.1     07-06    TPro  6.8  /  Alb  4.0  /  TBili  0.3  /  DBili  x   /  AST  14  /  ALT  6<L>  /  AlkPhos  137<H>  07-06    PT/INR - ( 06 Jul 2018 09:47 )   PT: 10.8 sec;   INR: 0.96 ratio         PTT - ( 06 Jul 2018 09:47 )  PTT:28.6 sec          RADIOLOGY & ADDITIONAL TESTS:    Imaging Personally Reviewed:    Consultant(s) Notes Reviewed:      Care Discussed with Consultants/Other Providers:

## 2018-07-07 NOTE — PROGRESS NOTE ADULT - SUBJECTIVE AND OBJECTIVE BOX
[facility]   [phone]  Dr. Urban (covering for Dr. Burger)  RENAL FOLLOW UP NOTE  --------------------------------------------------------------------------------  Patient seen and examined.  Denies chest pain / palpitations / SOB.  s/p HD yesterday; for repeat HD today to return patient to TTS schedule    PAST HISTORY  --------------------------------------------------------------------------------  No significant changes to PMH, PSH, FHx, SHx, unless otherwise noted    ALLERGIES & MEDICATIONS  --------------------------------------------------------------------------------  Allergies    No Known Allergies    Intolerances      Standing Inpatient Medications  aspirin enteric coated 81 milliGRAM(s) Oral daily  atorvastatin 20 milliGRAM(s) Oral at bedtime  clopidogrel Tablet 75 milliGRAM(s) Oral at bedtime  cyclopentolate 2% Solution 1 Drop(s) Both EYES daily  dexamethasone/neomycin/polymyxin Suspension 1 Drop(s) Left EYE every 8 hours  dextrose 5%. 1000 milliLiter(s) IV Continuous <Continuous>  dextrose 5%. 1000 milliLiter(s) IV Continuous <Continuous>  dextrose 50% Injectable 12.5 Gram(s) IV Push once  dextrose 50% Injectable 25 Gram(s) IV Push once  dextrose 50% Injectable 25 Gram(s) IV Push once  dextrose 50% Injectable 12.5 Gram(s) IV Push once  dextrose 50% Injectable 25 Gram(s) IV Push once  dextrose 50% Injectable 25 Gram(s) IV Push once  heparin  Injectable 5000 Unit(s) SubCutaneous every 12 hours  hydrALAZINE 50 milliGRAM(s) Oral every 8 hours  insulin glargine Injectable (LANTUS) 9 Unit(s) SubCutaneous at bedtime  insulin lispro (HumaLOG) corrective regimen sliding scale   SubCutaneous three times a day before meals  insulin lispro (HumaLOG) corrective regimen sliding scale   SubCutaneous at bedtime  insulin lispro Injectable (HumaLOG) 3 Unit(s) SubCutaneous three times a day before meals  ketorolac 0.5% Ophthalmic Solution 1 Drop(s) Both EYES three times a day  lisinopril 40 milliGRAM(s) Oral daily  metoprolol tartrate 25 milliGRAM(s) Oral two times a day  prednisoLONE acetate 1% Suspension 1 Drop(s) Both EYES three times a day  sodium chloride 0.9%. 1000 milliLiter(s) IV Continuous <Continuous>    PRN Inpatient Medications  dextrose 40% Gel 15 Gram(s) Oral once PRN  dextrose 40% Gel 15 Gram(s) Oral once PRN  glucagon  Injectable 1 milliGRAM(s) IntraMuscular once PRN  glucagon  Injectable 1 milliGRAM(s) IntraMuscular once PRN      FOCUSED REVIEW OF SYSTEMS  --------------------------------------------------------------------------------  denies fevers/chills  denies cp/palpitations  denies SOB/cough  denies N/V/Abd pain    VITALS/PHYSICAL EXAM  --------------------------------------------------------------------------------  T(C): 36.7 (07-07-18 @ 08:52), Max: 37 (07-06-18 @ 23:09)  HR: 66 (07-07-18 @ 08:52) (64 - 78)  BP: 134/- (07-07-18 @ 08:52) (133/70 - 168/89)  RR: 18 (07-07-18 @ 08:52) (17 - 18)  SpO2: 96% (07-07-18 @ 08:52) (96% - 99%)  Wt(kg): --        07-06-18 @ 07:01  -  07-07-18 @ 07:00  --------------------------------------------------------  IN: 900 mL / OUT: 2600 mL / NET: -1700 mL      Physical Exam:  	Gen: NAD, well-appearing  	Pulm: CTA B/L  	CV: RRR, S1S2  	Abd: +BS, soft, nontender/nondistended  	: No suprapubic tenderness.  no irene          Extremity: No cyanosis, no edema  	Neuro: A&O x 3              Access: LUE AVF with mild pulsatility      LABS/STUDIES  --------------------------------------------------------------------------------              10.5   4.64  >-----------<  257      [07-06-18 @ 09:47]              33.9     135  |  91  |  21  ----------------------------<  248      [07-07-18 @ 06:39]  4.7   |  24  |  4.33        Ca     7.8     [07-07-18 @ 06:39]      Mg     2.1     [07-06-18 @ 07:26]      Phos  6.6     [07-06-18 @ 07:26]    TPro  6.8  /  Alb  4.0  /  TBili  0.3  /  DBili  x   /  AST  14  /  ALT  6   /  AlkPhos  137  [07-06-18 @ 07:26]    PT/INR: PT 10.8 , INR 0.96       [07-06-18 @ 09:47]  PTT: 28.6       [07-06-18 @ 09:47]      eGFR if Non African American: 10 mL/min/1.73M2 (07-07 @ 06:39)  eGFR if : 12 mL/min/1.73M2 (07-07 @ 06:39)    Creatinine Trend:  SCr 4.33 [07-07 @ 06:39]  SCr 6.48 [07-06 @ 07:26]  SCr 4.53 [07-05 @ 11:29]  SCr 3.30 [07-05 @ 00:48]  SCr 6.46 [07-04 @ 14:12]                  PTH -- (Ca 8.3)      [03-03-18 @ 08:53]   134  HbA1c 7.2      [07-03-18 @ 05:01]  TSH 1.07      [12-19-17 @ 06:12]  Lipid: chol 113, TG 86, HDL 59, LDL 37      [04-30-18 @ 06:44]

## 2018-07-07 NOTE — PROGRESS NOTE ADULT - PROBLEM SELECTOR PLAN 1
S/p HD yesterday  for repeat treatment today to return patient to ambulatory T/T/S schedule-- discussed with HD unit RNs  Dose meds for eGFR <15  s/p fistulogram and angioplasty in setting of suspected fistula dysfunction  Continue YOLANDA and vitamin D with HD  no renal objection to d/c planning over the weekend after HD today

## 2018-07-07 NOTE — CHART NOTE - NSCHARTNOTEFT_GEN_A_CORE
Notified by RN patient with FS of 457, repeat 449. Patient was in HD and with her dinner meal tray received 3U humalog premeal and 2 U correctional at that time around 19:16. Discussed with endocrine on call, was under the impression patient was being discharged tonight and had earlier advised insulin pump to be restarted at evening time, patient stayed hospitalized overnight because she went down to dialysis late. Endocrine advised to continue with 9U of lantus at bedtime, and 4U of correctional coverage, will repeat FS at 2AM and if sugars are >250 will treat with correctional scale. Will endorse to day team to follow up restarting insulin pump tomorrow. Will continue to monitor.    Nga Eldridge PA-C  [phone]

## 2018-07-08 ENCOUNTER — TRANSCRIPTION ENCOUNTER (OUTPATIENT)
Age: 63
End: 2018-07-08

## 2018-07-08 DIAGNOSIS — E10.9 TYPE 1 DIABETES MELLITUS WITHOUT COMPLICATIONS: ICD-10-CM

## 2018-07-08 LAB
ANION GAP SERPL CALC-SCNC: 17 MMOL/L — SIGNIFICANT CHANGE UP (ref 5–17)
B-OH-BUTYR SERPL-SCNC: 0.1 MMOL/L — SIGNIFICANT CHANGE UP
BASOPHILS # BLD AUTO: 0 K/UL — SIGNIFICANT CHANGE UP (ref 0–0.2)
BASOPHILS NFR BLD AUTO: 0 % — SIGNIFICANT CHANGE UP (ref 0–2)
BUN SERPL-MCNC: 20 MG/DL — SIGNIFICANT CHANGE UP (ref 7–23)
CALCIUM SERPL-MCNC: 8.3 MG/DL — LOW (ref 8.4–10.5)
CHLORIDE SERPL-SCNC: 92 MMOL/L — LOW (ref 96–108)
CO2 SERPL-SCNC: 27 MMOL/L — SIGNIFICANT CHANGE UP (ref 22–31)
CREAT SERPL-MCNC: 3.83 MG/DL — HIGH (ref 0.5–1.3)
CULTURE RESULTS: SIGNIFICANT CHANGE UP
EOSINOPHIL # BLD AUTO: 0.1 K/UL — SIGNIFICANT CHANGE UP (ref 0–0.5)
EOSINOPHIL NFR BLD AUTO: 1.9 % — SIGNIFICANT CHANGE UP (ref 0–6)
GLUCOSE BLDC GLUCOMTR-MCNC: 151 MG/DL — HIGH (ref 70–99)
GLUCOSE BLDC GLUCOMTR-MCNC: 310 MG/DL — HIGH (ref 70–99)
GLUCOSE BLDC GLUCOMTR-MCNC: 400 MG/DL — HIGH (ref 70–99)
GLUCOSE BLDC GLUCOMTR-MCNC: 416 MG/DL — HIGH (ref 70–99)
GLUCOSE BLDC GLUCOMTR-MCNC: 500 MG/DL — CRITICAL HIGH (ref 70–99)
GLUCOSE BLDC GLUCOMTR-MCNC: 71 MG/DL — SIGNIFICANT CHANGE UP (ref 70–99)
GLUCOSE SERPL-MCNC: 177 MG/DL — HIGH (ref 70–99)
HCT VFR BLD CALC: 38.1 % — SIGNIFICANT CHANGE UP (ref 34.5–45)
HGB BLD-MCNC: 12.2 G/DL — SIGNIFICANT CHANGE UP (ref 11.5–15.5)
LYMPHOCYTES # BLD AUTO: 0.9 K/UL — LOW (ref 1–3.3)
LYMPHOCYTES # BLD AUTO: 18.8 % — SIGNIFICANT CHANGE UP (ref 13–44)
MCHC RBC-ENTMCNC: 31.5 PG — SIGNIFICANT CHANGE UP (ref 27–34)
MCHC RBC-ENTMCNC: 32 GM/DL — SIGNIFICANT CHANGE UP (ref 32–36)
MCV RBC AUTO: 98.4 FL — SIGNIFICANT CHANGE UP (ref 80–100)
MONOCYTES # BLD AUTO: 0.6 K/UL — SIGNIFICANT CHANGE UP (ref 0–0.9)
MONOCYTES NFR BLD AUTO: 13.2 % — SIGNIFICANT CHANGE UP (ref 2–14)
NEUTROPHILS # BLD AUTO: 3.1 K/UL — SIGNIFICANT CHANGE UP (ref 1.8–7.4)
NEUTROPHILS NFR BLD AUTO: 66.1 % — SIGNIFICANT CHANGE UP (ref 43–77)
PLATELET # BLD AUTO: 221 K/UL — SIGNIFICANT CHANGE UP (ref 150–400)
POTASSIUM SERPL-MCNC: 3.7 MMOL/L — SIGNIFICANT CHANGE UP (ref 3.5–5.3)
POTASSIUM SERPL-SCNC: 3.7 MMOL/L — SIGNIFICANT CHANGE UP (ref 3.5–5.3)
RBC # BLD: 3.87 M/UL — SIGNIFICANT CHANGE UP (ref 3.8–5.2)
RBC # FLD: 13.2 % — SIGNIFICANT CHANGE UP (ref 10.3–14.5)
SODIUM SERPL-SCNC: 136 MMOL/L — SIGNIFICANT CHANGE UP (ref 135–145)
SPECIMEN SOURCE: SIGNIFICANT CHANGE UP
WBC # BLD: 4.7 K/UL — SIGNIFICANT CHANGE UP (ref 3.8–10.5)
WBC # FLD AUTO: 4.7 K/UL — SIGNIFICANT CHANGE UP (ref 3.8–10.5)

## 2018-07-08 PROCEDURE — 99232 SBSQ HOSP IP/OBS MODERATE 35: CPT | Mod: GC

## 2018-07-08 RX ORDER — METOPROLOL TARTRATE 50 MG
1 TABLET ORAL
Qty: 60 | Refills: 0 | OUTPATIENT
Start: 2018-07-08 | End: 2018-08-06

## 2018-07-08 RX ORDER — INSULIN LISPRO 100/ML
4 VIAL (ML) SUBCUTANEOUS ONCE
Qty: 0 | Refills: 0 | Status: COMPLETED | OUTPATIENT
Start: 2018-07-08 | End: 2018-07-08

## 2018-07-08 RX ORDER — NEOMYCIN/POLYMYXIN B/DEXAMETHA 0.1 %
1 SUSPENSION, DROPS(FINAL DOSAGE FORM)(ML) OPHTHALMIC (EYE)
Qty: 0 | Refills: 0 | COMMUNITY
Start: 2018-07-08

## 2018-07-08 RX ORDER — KETOROLAC TROMETHAMINE 0.5 %
1 DROPS OPHTHALMIC (EYE)
Qty: 0 | Refills: 0 | COMMUNITY
Start: 2018-07-08

## 2018-07-08 RX ORDER — HYDRALAZINE HCL 50 MG
1 TABLET ORAL
Qty: 90 | Refills: 0 | OUTPATIENT
Start: 2018-07-08 | End: 2018-08-06

## 2018-07-08 RX ORDER — FUROSEMIDE 40 MG
1 TABLET ORAL
Qty: 0 | Refills: 0 | COMMUNITY

## 2018-07-08 RX ORDER — OXYCODONE HYDROCHLORIDE 5 MG/1
2.5 TABLET ORAL ONCE
Qty: 0 | Refills: 0 | Status: DISCONTINUED | OUTPATIENT
Start: 2018-07-08 | End: 2018-07-08

## 2018-07-08 RX ORDER — PREDNISOLONE SODIUM PHOSPHATE 1 %
1 DROPS OPHTHALMIC (EYE)
Qty: 0 | Refills: 0 | COMMUNITY
Start: 2018-07-08

## 2018-07-08 RX ORDER — CYCLOPENTOLATE HYDROCHLORIDE 10 MG/ML
1 SOLUTION/ DROPS OPHTHALMIC
Qty: 0 | Refills: 0 | COMMUNITY
Start: 2018-07-08

## 2018-07-08 RX ADMIN — Medication 1 DROP(S): at 05:55

## 2018-07-08 RX ADMIN — ATORVASTATIN CALCIUM 20 MILLIGRAM(S): 80 TABLET, FILM COATED ORAL at 22:12

## 2018-07-08 RX ADMIN — Medication 50 MILLIGRAM(S): at 06:03

## 2018-07-08 RX ADMIN — Medication 1 DROP(S): at 22:18

## 2018-07-08 RX ADMIN — Medication 6: at 17:16

## 2018-07-08 RX ADMIN — Medication 1: at 08:26

## 2018-07-08 RX ADMIN — Medication 3 UNIT(S): at 08:26

## 2018-07-08 RX ADMIN — Medication 3 UNIT(S): at 17:16

## 2018-07-08 RX ADMIN — OXYCODONE HYDROCHLORIDE 2.5 MILLIGRAM(S): 5 TABLET ORAL at 02:25

## 2018-07-08 RX ADMIN — Medication 1 DROP(S): at 15:11

## 2018-07-08 RX ADMIN — Medication 50 MILLIGRAM(S): at 22:12

## 2018-07-08 RX ADMIN — Medication 2: at 22:00

## 2018-07-08 RX ADMIN — Medication 4 UNIT(S): at 03:00

## 2018-07-08 RX ADMIN — CYCLOPENTOLATE HYDROCHLORIDE 1 DROP(S): 10 SOLUTION/ DROPS OPHTHALMIC at 12:31

## 2018-07-08 RX ADMIN — Medication 25 MILLIGRAM(S): at 22:12

## 2018-07-08 RX ADMIN — Medication 1 DROP(S): at 05:54

## 2018-07-08 RX ADMIN — INSULIN GLARGINE 9 UNIT(S): 100 INJECTION, SOLUTION SUBCUTANEOUS at 22:01

## 2018-07-08 RX ADMIN — LISINOPRIL 40 MILLIGRAM(S): 2.5 TABLET ORAL at 06:03

## 2018-07-08 RX ADMIN — Medication 25 MILLIGRAM(S): at 12:29

## 2018-07-08 RX ADMIN — Medication 50 MILLIGRAM(S): at 15:09

## 2018-07-08 RX ADMIN — Medication 1 DROP(S): at 22:19

## 2018-07-08 RX ADMIN — Medication 81 MILLIGRAM(S): at 12:31

## 2018-07-08 RX ADMIN — CLOPIDOGREL BISULFATE 75 MILLIGRAM(S): 75 TABLET, FILM COATED ORAL at 22:12

## 2018-07-08 RX ADMIN — OXYCODONE HYDROCHLORIDE 2.5 MILLIGRAM(S): 5 TABLET ORAL at 02:15

## 2018-07-08 NOTE — PROGRESS NOTE ADULT - SUBJECTIVE AND OBJECTIVE BOX
History:  Patient was quite hyperglycemic last night which she attributes to over-eating after dinner.   At this time, she is eager to be re-started on the pump.     Her pump settings are:   basal rate:   12 am: 0.275 units/hr  5 am: 0.375 units/hr  carb ratio: 1: 15  ISF: 12 am: 60  7 am: 40   6 pm: 60        MEDICATIONS  (STANDING):  aspirin enteric coated 81 milliGRAM(s) Oral daily  atorvastatin 20 milliGRAM(s) Oral at bedtime  clopidogrel Tablet 75 milliGRAM(s) Oral at bedtime  cyclopentolate 2% Solution 1 Drop(s) Both EYES daily  dexamethasone/neomycin/polymyxin Suspension 1 Drop(s) Left EYE every 8 hours  dextrose 5%. 1000 milliLiter(s) (50 mL/Hr) IV Continuous <Continuous>  dextrose 5%. 1000 milliLiter(s) (50 mL/Hr) IV Continuous <Continuous>  dextrose 50% Injectable 12.5 Gram(s) IV Push once  dextrose 50% Injectable 25 Gram(s) IV Push once  dextrose 50% Injectable 25 Gram(s) IV Push once  dextrose 50% Injectable 12.5 Gram(s) IV Push once  dextrose 50% Injectable 25 Gram(s) IV Push once  dextrose 50% Injectable 25 Gram(s) IV Push once  heparin  Injectable 5000 Unit(s) SubCutaneous every 12 hours  hydrALAZINE 50 milliGRAM(s) Oral every 8 hours  insulin glargine Injectable (LANTUS) 9 Unit(s) SubCutaneous at bedtime  insulin lispro (HumaLOG) corrective regimen sliding scale   SubCutaneous three times a day before meals  insulin lispro (HumaLOG) corrective regimen sliding scale   SubCutaneous at bedtime  insulin lispro Injectable (HumaLOG) 3 Unit(s) SubCutaneous three times a day before meals  ketorolac 0.5% Ophthalmic Solution 1 Drop(s) Both EYES three times a day  lisinopril 40 milliGRAM(s) Oral daily  metoprolol tartrate 25 milliGRAM(s) Oral two times a day  prednisoLONE acetate 1% Suspension 1 Drop(s) Both EYES three times a day  sodium chloride 0.9%. 1000 milliLiter(s) (50 mL/Hr) IV Continuous <Continuous>    MEDICATIONS  (PRN):  dextrose 40% Gel 15 Gram(s) Oral once PRN Blood Glucose LESS THAN 70 milliGRAM(s)/deciLiter  dextrose 40% Gel 15 Gram(s) Oral once PRN Blood Glucose LESS THAN 70 milliGRAM(s)/deciliter  glucagon  Injectable 1 milliGRAM(s) IntraMuscular once PRN Glucose <70 milliGRAM(s)/deciLiter  glucagon  Injectable 1 milliGRAM(s) IntraMuscular once PRN Glucose LESS THAN 70 milligrams/deciliter      Allergies  No Known Allergies      Review of Systems:  Constitutional: No fever  Eyes: No blurry vision  Neuro: No tremors  HEENT: No pain  Cardiovascular: No chest pain, palpitations  Respiratory: No SOB, no cough  GI: No nausea, vomiting, abdominal pain  : No dysuria  Skin: no rash  Psych: no depression  Endocrine: no polyuria, polydipsia      ALL OTHER SYSTEMS REVIEWED AND NEGATIVE    PHYSICAL EXAM:  VITALS: T(C): 36.8 (07-08-18 @ 07:50)  T(F): 98.3 (07-08-18 @ 07:50), Max: 98.3 (07-07-18 @ 23:29)  HR: 71 (07-08-18 @ 07:50) (65 - 92)  BP: 117/64 (07-08-18 @ 07:50) (115/72 - 142/58)  RR:  (16 - 18)  SpO2:  (97% - 100%)    GENERAL: NAD, well-groomed, well-developed  EYES: No proptosis, no lid lag, anicteric  HEENT:  Atraumatic, Normocephalic, moist mucous membranes  THYROID: Normal size, no palpable nodules  RESPIRATORY: Clear to auscultation bilaterally; No rales, rhonchi, wheezing, or rubs  CARDIOVASCULAR: Regular rate and rhythm; No murmurs; no peripheral edema  GI: Soft, nontender, non distended, normal bowel sounds  SKIN: Dry, intact, No rashes or lesions  MUSCULOSKELETAL: Full range of motion, normal strength  NEURO: sensation intact, extraocular movements intact, no tremor, normal reflexes  PSYCH: Alert and oriented x 3, normal affect, normal mood      POCT Blood Glucose.: 71 mg/dL (07-08-18 @ 12:18)  POCT Blood Glucose.: 151 mg/dL (07-08-18 @ 07:37)  POCT Blood Glucose.: 400 mg/dL (07-08-18 @ 02:19)  POCT Blood Glucose.: 416 mg/dL (07-08-18 @ 02:06)  POCT Blood Glucose.: 449 mg/dL (07-07-18 @ 21:42)  POCT Blood Glucose.: 457 mg/dL (07-07-18 @ 21:33)  POCT Blood Glucose.: 205 mg/dL (07-07-18 @ 18:53)  POCT Blood Glucose.: 321 mg/dL (07-07-18 @ 12:02)  POCT Blood Glucose.: 194 mg/dL (07-07-18 @ 07:59)  POCT Blood Glucose.: 103 mg/dL (07-06-18 @ 23:14)  POCT Blood Glucose.: 187 mg/dL (07-06-18 @ 19:08)  POCT Blood Glucose.: 119 mg/dL (07-06-18 @ 17:08)  POCT Blood Glucose.: 110 mg/dL (07-06-18 @ 16:18)  POCT Blood Glucose.: 107 mg/dL (07-06-18 @ 15:00)  POCT Blood Glucose.: 184 mg/dL (07-06-18 @ 12:11)  POCT Blood Glucose.: 246 mg/dL (07-06-18 @ 08:25)  POCT Blood Glucose.: 183 mg/dL (07-05-18 @ 21:51)  POCT Blood Glucose.: 221 mg/dL (07-05-18 @ 17:06)      07-08    136  |  92<L>  |  20  ----------------------------<  177<H>  3.7   |  27  |  3.83<H>    EGFR if : 14<L>  EGFR if non : 12<L>    Ca    8.3<L>      07-08  Mg     2.1     07-06  Phos  6.6     07-06    TPro  6.8  /  Alb  4.0  /  TBili  0.3  /  DBili  x   /  AST  14  /  ALT  6<L>  /  AlkPhos  137<H>  07-06          Thyroid Function Tests:      Hemoglobin A1C, Whole Blood: 7.2 % <H> [4.0 - 5.6] (07-03-18 @ 05:01)

## 2018-07-08 NOTE — DISCHARGE NOTE ADULT - ADDITIONAL INSTRUCTIONS
Please restart insulin pump at 5pm today Please restart insulin pump at 5pm today; Follow up with Dr Ley with in 5 days of discharge  - follow up with Dr. Elizalde as outpatient 1-2 weeks after discharge.  Report to your dialysis site as scheduled tomorrow YOU SIGNED OUT AGAINST MEDICAL ADVICE  Please restart insulin pump at 5pm today; Follow up with Dr Ley with in 5 days of discharge  - follow up with Dr. Elizalde as outpatient 1-2 weeks after discharge.  Report to your dialysis site as  tomorrow- Please restart insulin pump at 5pm today; Follow up with Dr Ley with in 5 days of discharge  - follow up with Dr. Elizalde as outpatient 1-2 weeks after discharge.  Report to your dialysis site as  tomorrow- Please restart insulin pump at 5pm today; Follow up with Dr Ley with in 5 days of discharge  - follow up with Dr. Elizalde as outpatient 1-2 weeks after discharge.  Report to your dialysis site today for Hemodialysis

## 2018-07-08 NOTE — DISCHARGE NOTE ADULT - HOSPITAL COURSE
62F h/o DM1 (has insulin pump, non-adherent) w frequent hospitalizations (last 05/2018) for DKA, ESRD on HD (LUE AVF, M/T/Th/Sa), CAD s/p  stents, HTN, HLD, CVA, PVD. Presented from home w/ nausea, anorexia, and abdominal pain starting today. Went to HD in the AM, then at home was feeling general malaise. She began to experience diffuse bilat lower abdominal pain as well as RUQ pain.  Labs notable for K+ 6.6 -> 7.4, bicarb 19, AG 27, Cr 8.13, Glucose 294, B-hydroxy 1.1, pH 7.30 in ED  Pt was started on insulin drip in the ED and admitted to MICU for DKA management. Nephrology following for HD. Due to repeated high K and creatinine despite HD vascular was consulted for AVF eval. Duplex revealed severe stenosis with venous outflow and patient had fistuloplasty . Patient had a closed AG on 7/5 and drip of .5u/hr was transitioned to basal bolus of 10 lantus and 4/4/4 lispro. Endocrine was consulted for insulin pump management and pump was restarted at 3:30pm on 7/5/18. Patient tolerating PO diet and abdominal pain resolved on 2nd day of admission. Patient remained HD.   S/P fistulogram and fistuloplasty of LUE AVF 7/6.   PLAN:   - may access fistula for HD :  return patient to ambulatory T/T/S schedule.  - can follow up with Dr. Elizalde as outpatient 1-2 weeks after discharge. 62F h/o DM1 (has insulin pump, non-adherent) w frequent hospitalizations (last 05/2018) for DKA, ESRD on HD (LUE AVF, M/T/Th/Sa), CAD s/p  stents, HTN, HLD, CVA, PVD. Presented from home w/ nausea, anorexia, and abdominal pain starting today. Went to HD in the AM, then at home was feeling general malaise. She began to experience diffuse bilat lower abdominal pain as well as RUQ pain.  Labs notable for K+ 6.6 -> 7.4, bicarb 19, AG 27, Cr 8.13, Glucose 294, B-hydroxy 1.1, pH 7.30 in ED  Pt was started on insulin drip in the ED and admitted to MICU for DKA management. Nephrology following for HD. Due to repeated high K and creatinine despite HD vascular was consulted for AVF eval. Duplex revealed severe stenosis with venous outflow and patient had fistuloplasty . Patient had a closed AG on 7/5 and drip of .5u/hr was transitioned to basal bolus of 10 lantus and 4/4/4 lispro. Endocrine was consulted for insulin pump management and pump was restarted at 3:30pm on 7/5/18. Patient tolerating PO diet and abdominal pain resolved on 2nd day of admission. Patient remained HD.   S/P fistulogram and fistuloplasty of LUE AVF 7/6.   PLAN:   - may access fistula for HD :  return patient to ambulatory T/T/S schedule.  - can follow up with Dr. Elizalde as outpatient 1-2 weeks after discharge.  -May f/u with Dr. Dyson (Endocrinology) as OP

## 2018-07-08 NOTE — PROGRESS NOTE ADULT - SUBJECTIVE AND OBJECTIVE BOX
[facility]   [phone]  Dr. Urban (covering for Dr. Burger)  RENAL FOLLOW UP NOTE  --------------------------------------------------------------------------------  Patient seen and examined earlier today.  s/p HD yesterday.  Awaiting discharge home    PAST HISTORY  --------------------------------------------------------------------------------  No significant changes to PMH, PSH, FHx, SHx, unless otherwise noted    ALLERGIES & MEDICATIONS  --------------------------------------------------------------------------------  Allergies    No Known Allergies    Intolerances      Standing Inpatient Medications  aspirin enteric coated 81 milliGRAM(s) Oral daily  atorvastatin 20 milliGRAM(s) Oral at bedtime  clopidogrel Tablet 75 milliGRAM(s) Oral at bedtime  cyclopentolate 2% Solution 1 Drop(s) Both EYES daily  dexamethasone/neomycin/polymyxin Suspension 1 Drop(s) Left EYE every 8 hours  dextrose 5%. 1000 milliLiter(s) IV Continuous <Continuous>  dextrose 5%. 1000 milliLiter(s) IV Continuous <Continuous>  dextrose 50% Injectable 12.5 Gram(s) IV Push once  dextrose 50% Injectable 25 Gram(s) IV Push once  dextrose 50% Injectable 25 Gram(s) IV Push once  dextrose 50% Injectable 12.5 Gram(s) IV Push once  dextrose 50% Injectable 25 Gram(s) IV Push once  dextrose 50% Injectable 25 Gram(s) IV Push once  heparin  Injectable 5000 Unit(s) SubCutaneous every 12 hours  hydrALAZINE 50 milliGRAM(s) Oral every 8 hours  insulin glargine Injectable (LANTUS) 9 Unit(s) SubCutaneous at bedtime  insulin lispro (HumaLOG) corrective regimen sliding scale   SubCutaneous three times a day before meals  insulin lispro (HumaLOG) corrective regimen sliding scale   SubCutaneous at bedtime  insulin lispro Injectable (HumaLOG) 3 Unit(s) SubCutaneous three times a day before meals  ketorolac 0.5% Ophthalmic Solution 1 Drop(s) Both EYES three times a day  lisinopril 40 milliGRAM(s) Oral daily  metoprolol tartrate 25 milliGRAM(s) Oral two times a day  prednisoLONE acetate 1% Suspension 1 Drop(s) Both EYES three times a day  sodium chloride 0.9%. 1000 milliLiter(s) IV Continuous <Continuous>    PRN Inpatient Medications  dextrose 40% Gel 15 Gram(s) Oral once PRN  dextrose 40% Gel 15 Gram(s) Oral once PRN  glucagon  Injectable 1 milliGRAM(s) IntraMuscular once PRN  glucagon  Injectable 1 milliGRAM(s) IntraMuscular once PRN      FOCUSED REVIEW OF SYSTEMS  --------------------------------------------------------------------------------  no fevers/rigors  no chest pain/palpitations  no SOB/cough  no N/V/Abd pain      VITALS/PHYSICAL EXAM  --------------------------------------------------------------------------------  T(C): 36.8 (07-08-18 @ 07:50), Max: 36.8 (07-07-18 @ 23:29)  HR: 71 (07-08-18 @ 07:50) (65 - 92)  BP: 117/64 (07-08-18 @ 07:50) (115/72 - 142/58)  RR: 18 (07-08-18 @ 07:50) (16 - 18)  SpO2: 99% (07-08-18 @ 07:50) (97% - 100%)  Wt(kg): --        07-07-18 @ 07:01  -  07-08-18 @ 07:00  --------------------------------------------------------  IN: 860 mL / OUT: 1500 mL / NET: -640 mL    07-08-18 @ 07:01  -  07-08-18 @ 12:11  --------------------------------------------------------  IN: 300 mL / OUT: 0 mL / NET: 300 mL      Physical Exam:  	Gen: NAD, well-appearing  	Pulm: CTA B/L  	CV: RRR, S1S2  	Abd: +BS, soft, nontender/nondistended  	: No suprapubic tenderness.  no irene          Extremity: No cyanosis, no edema  	Neuro: A&O x 3              Access: LUE AVF with mild pulsatility        LABS/STUDIES  --------------------------------------------------------------------------------              12.2   4.7   >-----------<  221      [07-08-18 @ 03:22]              38.1     136  |  92  |  20  ----------------------------<  177      [07-08-18 @ 06:57]  3.7   |  27  |  3.83        Ca     8.3     [07-08-18 @ 06:57]            eGFR if Non African American: 12 mL/min/1.73M2 (07-08 @ 06:57)  eGFR if : 14 mL/min/1.73M2 (07-08 @ 06:57)    Creatinine Trend:  SCr 3.83 [07-08 @ 06:57]  SCr 4.33 [07-07 @ 06:39]  SCr 6.48 [07-06 @ 07:26]  SCr 4.53 [07-05 @ 11:29]  SCr 3.30 [07-05 @ 00:48]                  PTH -- (Ca 8.3)      [03-03-18 @ 08:53]   134  HbA1c 7.2      [07-03-18 @ 05:01]  TSH 1.07      [12-19-17 @ 06:12]  Lipid: chol 113, TG 86, HDL 59, LDL 37      [04-30-18 @ 06:44]

## 2018-07-08 NOTE — PROGRESS NOTE ADULT - ASSESSMENT
62 f with  DKA resolved- start Insulin pump as per endocrine  ESRD- HD  s/p AVF reevaluation by vascular.  CAD stable  Vaginal bleeding resolved- Gyn evaluation as OTP  DC home Follow with PMD/Endocrine/Vascular in 1 week.  Pierce Viera MD pager [phone]

## 2018-07-08 NOTE — PROGRESS NOTE ADULT - ASSESSMENT
61 y/o female with ESRD on HD, admitted with DKA/hyperkalemia requiring urgent HD, now s/p fistulogram with angioplasty

## 2018-07-08 NOTE — DISCHARGE NOTE ADULT - CARE PLAN
Principal Discharge DX:	DKA (diabetic ketoacidoses)  Goal:	DKA resolved  Assessment and plan of treatment:	resume insulin pump   Your next appointment with endocrinologist ([facility] endocrine ph: [phone])/PMD is -   HgA1C this admission -  Make sure you get your HgA1c checked every three months.  If you take oral diabetes medications, check your blood glucose two times a day.  If you take insulin, check your blood glucose before meals and at bedtime.  It's important not to skip any meals.  Keep a log of your blood glucose results and always take it with you to your doctor appointments.  Keep a list of your current medications including injectables and over the counter medications and bring this medication list with you to all your doctor appointments.  If you have not seen your ophthalmologist this year call for appointment.  Check your feet daily for redness, sores, or openings. Do not self treat. If no improvement in two days call your primary care physician for an appointment.  Low blood sugar (hypoglycemia) is a blood sugar below 70mg/dl. Check your blood sugar if you feel signs/symptoms of hypoglycemia. If your blood sugar is below 70 take 15 grams of carbohydrates (ex 4 oz of apple juice, 3-4 glucose tablets, or 4-6 oz of regular soda) wait 15 minutes and repeat blood sugar to make sure it comes up above 70.  If your blood sugar is above 70 and you are due for a meal, have a meal.  If you are not due for a meal have a snack.  This snack helps keeps your blood sugar at a safe range.  Secondary Diagnosis:	ESRD (end stage renal disease)  Assessment and plan of treatment:	S/P fistulogram and fistuloplasty of LUE AVF 7/6.   - may access fistula for HD.   follow up with Dr. Elizalde as outpatient 1-2 weeks after discharge  Secondary Diagnosis:	Vaginal bleeding  Assessment and plan of treatment:	Vaginal bleeding resolved- Gyn evaluation as OTP Principal Discharge DX:	DKA (diabetic ketoacidoses)  Goal:	DKA resolved  Assessment and plan of treatment:	resume insulin pump   Call your endocrinologist for next appt ([facility] endocrine ph: [phone])/PMD is -   HgA1C this admission -  Make sure you get your HgA1c checked every three months.  If you take oral diabetes medications, check your blood glucose two times a day.  If you take insulin, check your blood glucose before meals and at bedtime.  It's important not to skip any meals.  Keep a log of your blood glucose results and always take it with you to your doctor appointments.  Keep a list of your current medications including injectables and over the counter medications and bring this medication list with you to all your doctor appointments.  If you have not seen your ophthalmologist this year call for appointment.  Check your feet daily for redness, sores, or openings. Do not self treat. If no improvement in two days call your primary care physician for an appointment.  Low blood sugar (hypoglycemia) is a blood sugar below 70mg/dl. Check your blood sugar if you feel signs/symptoms of hypoglycemia. If your blood sugar is below 70 take 15 grams of carbohydrates (ex 4 oz of apple juice, 3-4 glucose tablets, or 4-6 oz of regular soda) wait 15 minutes and repeat blood sugar to make sure it comes up above 70.  If your blood sugar is above 70 and you are due for a meal, have a meal.  If you are not due for a meal have a snack.  This snack helps keeps your blood sugar at a safe range.  Secondary Diagnosis:	ESRD (end stage renal disease)  Assessment and plan of treatment:	S/P fistulogram and fistuloplasty of LUE AVF 7/6.   - may access fistula for HD.   follow up with Dr. Elizalde as outpatient 1-2 weeks after discharge  Secondary Diagnosis:	Vaginal bleeding  Assessment and plan of treatment:	Vaginal bleeding resolved- Gyn evaluation as OTP Principal Discharge DX:	DKA (diabetic ketoacidoses)  Goal:	DKA resolved  Assessment and plan of treatment:	resume insulin pump   Call your endocrinologist for next appt ([facility] endocrine ph: [phone])/PMD is - Dr. Ley  HgA1C this admission -  Make sure you get your HgA1c checked every three months.  If you take oral diabetes medications, check your blood glucose two times a day.  If you take insulin, check your blood glucose before meals and at bedtime.  It's important not to skip any meals.  Keep a log of your blood glucose results and always take it with you to your doctor appointments.  Keep a list of your current medications including injectables and over the counter medications and bring this medication list with you to all your doctor appointments.  If you have not seen your ophthalmologist this year call for appointment.  Check your feet daily for redness, sores, or openings. Do not self treat. If no improvement in two days call your primary care physician for an appointment.  Low blood sugar (hypoglycemia) is a blood sugar below 70mg/dl. Check your blood sugar if you feel signs/symptoms of hypoglycemia. If your blood sugar is below 70 take 15 grams of carbohydrates (ex 4 oz of apple juice, 3-4 glucose tablets, or 4-6 oz of regular soda) wait 15 minutes and repeat blood sugar to make sure it comes up above 70.  If your blood sugar is above 70 and you are due for a meal, have a meal.  If you are not due for a meal have a snack.  This snack helps keeps your blood sugar at a safe range.  Secondary Diagnosis:	ESRD (end stage renal disease)  Assessment and plan of treatment:	S/P fistulogram and fistuloplasty of LUE AVF 7/6.   - may access fistula for HD.   follow up with Dr. Elizalde as outpatient 1-2 weeks after discharge  Secondary Diagnosis:	Vaginal bleeding  Assessment and plan of treatment:	Vaginal bleeding resolved- Gyn evaluation as OTP Principal Discharge DX:	DKA (diabetic ketoacidoses)  Goal:	DKA resolved  Assessment and plan of treatment:	resume insulin pump   Endocrinologist ([facility] endocrine ph: [phone])/PMD is - Dr. Ley Appointment on 9/10 at 11:30am  HgA1C this admission -  Make sure you get your HgA1c checked every three months.  If you take oral diabetes medications, check your blood glucose two times a day.  If you take insulin, check your blood glucose before meals and at bedtime.  It's important not to skip any meals.  Keep a log of your blood glucose results and always take it with you to your doctor appointments.  Keep a list of your current medications including injectables and over the counter medications and bring this medication list with you to all your doctor appointments.  If you have not seen your ophthalmologist this year call for appointment.  Check your feet daily for redness, sores, or openings. Do not self treat. If no improvement in two days call your primary care physician for an appointment.  Low blood sugar (hypoglycemia) is a blood sugar below 70mg/dl. Check your blood sugar if you feel signs/symptoms of hypoglycemia. If your blood sugar is below 70 take 15 grams of carbohydrates (ex 4 oz of apple juice, 3-4 glucose tablets, or 4-6 oz of regular soda) wait 15 minutes and repeat blood sugar to make sure it comes up above 70.  If your blood sugar is above 70 and you are due for a meal, have a meal.  If you are not due for a meal have a snack.  This snack helps keeps your blood sugar at a safe range.  Secondary Diagnosis:	ESRD (end stage renal disease)  Assessment and plan of treatment:	S/P fistulogram and fistuloplasty of LUE AVF 7/6.   - may access fistula for HD.   follow up with Dr. Elizalde as outpatient 1-2 weeks after discharge  Secondary Diagnosis:	Vaginal bleeding  Assessment and plan of treatment:	Vaginal bleeding resolved- Gyn evaluation as OTP

## 2018-07-08 NOTE — DISCHARGE NOTE ADULT - MEDICATION SUMMARY - MEDICATIONS TO TAKE
I will START or STAY ON the medications listed below when I get home from the hospital:    aspirin 81 mg oral delayed release tablet  -- 1 tab(s) by mouth once a day  -- Indication: For CAD    lisinopril 40 mg oral tablet  -- 1 tab(s) by mouth once a day  -- Indication: For Hypertension    insulin lispro 100 units/mL subcutaneous solution  -- as per  insulin pump setting(Insert pump at 8 PM tonight)  Humalog 3-4 units pre meals until then  -- Indication: For Type 1 diabetes mellitus with ketoacidosis without coma    atorvastatin 20 mg oral tablet  -- 1 tab(s) by mouth once a day (at bedtime)  -- Indication: For Hyperlipidemia    clopidogrel 75 mg oral tablet  -- 1 tab(s) by mouth once a day (at bedtime)  -- Indication: For CAD    metoprolol tartrate 25 mg oral tablet  -- 1 tab(s) by mouth 2 times a day  -- Indication: For Hypertension    cyclopentolate 2% ophthalmic solution  -- 1 drop(s) to each affected eye once a day  -- Indication: For post cataract    ketorolac 0.5% ophthalmic solution  -- 1 drop(s) to each affected eye 3 times a day  -- Indication: For post cataract    prednisoLONE acetate 1% ophthalmic suspension  -- 1 drop(s) to each affected eye 3 times a day  -- Indication: For post cataract    neomycin/polymyxin B/dexamethasone 3.5 mg-10,000 units-1 mg/mL ophthalmic suspension  -- 1 drop(s) to each affected eye every 8 hours  -- Indication: For post cataract    hydrALAZINE 50 mg oral tablet  -- 1 tab(s) by mouth every 8 hours  -- Indication: For Hypertension

## 2018-07-08 NOTE — PROGRESS NOTE ADULT - SUBJECTIVE AND OBJECTIVE BOX
Patient is a 62y old  Female who presents with a chief complaint of DKA (08 Jul 2018 11:04)      SUBJECTIVE / OVERNIGHT EVENTS: Comfortable without new complaints. Insulin pump was not started yesterday.  at bedside.  Review of Systems  chest pain no  palpitations no  sob no  nausea no  headache no    MEDICATIONS  (STANDING):  aspirin enteric coated 81 milliGRAM(s) Oral daily  atorvastatin 20 milliGRAM(s) Oral at bedtime  clopidogrel Tablet 75 milliGRAM(s) Oral at bedtime  cyclopentolate 2% Solution 1 Drop(s) Both EYES daily  dexamethasone/neomycin/polymyxin Suspension 1 Drop(s) Left EYE every 8 hours  dextrose 5%. 1000 milliLiter(s) (50 mL/Hr) IV Continuous <Continuous>  dextrose 5%. 1000 milliLiter(s) (50 mL/Hr) IV Continuous <Continuous>  dextrose 50% Injectable 12.5 Gram(s) IV Push once  dextrose 50% Injectable 25 Gram(s) IV Push once  dextrose 50% Injectable 25 Gram(s) IV Push once  dextrose 50% Injectable 12.5 Gram(s) IV Push once  dextrose 50% Injectable 25 Gram(s) IV Push once  dextrose 50% Injectable 25 Gram(s) IV Push once  heparin  Injectable 5000 Unit(s) SubCutaneous every 12 hours  hydrALAZINE 50 milliGRAM(s) Oral every 8 hours  insulin glargine Injectable (LANTUS) 9 Unit(s) SubCutaneous at bedtime  insulin lispro (HumaLOG) corrective regimen sliding scale   SubCutaneous three times a day before meals  insulin lispro (HumaLOG) corrective regimen sliding scale   SubCutaneous at bedtime  insulin lispro Injectable (HumaLOG) 3 Unit(s) SubCutaneous three times a day before meals  ketorolac 0.5% Ophthalmic Solution 1 Drop(s) Both EYES three times a day  lisinopril 40 milliGRAM(s) Oral daily  metoprolol tartrate 25 milliGRAM(s) Oral two times a day  prednisoLONE acetate 1% Suspension 1 Drop(s) Both EYES three times a day  sodium chloride 0.9%. 1000 milliLiter(s) (50 mL/Hr) IV Continuous <Continuous>    MEDICATIONS  (PRN):  dextrose 40% Gel 15 Gram(s) Oral once PRN Blood Glucose LESS THAN 70 milliGRAM(s)/deciLiter  dextrose 40% Gel 15 Gram(s) Oral once PRN Blood Glucose LESS THAN 70 milliGRAM(s)/deciliter  glucagon  Injectable 1 milliGRAM(s) IntraMuscular once PRN Glucose <70 milliGRAM(s)/deciLiter  glucagon  Injectable 1 milliGRAM(s) IntraMuscular once PRN Glucose LESS THAN 70 milligrams/deciliter          PHYSICAL EXAM:  GENERAL: NAD, well-developed  HEAD:  Atraumatic, Normocephalic  EYES: EOMI, PERRLA, conjunctiva and sclera clear  NECK: Supple, No JVD  CHEST/LUNG: Clear to auscultation bilaterally; No wheeze  HEART: Regular rate and rhythm; No murmurs, rubs, or gallops  ABDOMEN: Soft, Nontender, Nondistended; Bowel sounds present  EXTREMITIES:  2+ Peripheral Pulses, No clubbing, cyanosis, or edema  PSYCH: AAOx3  NEUROLOGY: non-focal  SKIN: No rashes or lesions    LABS:                        12.2   4.7   )-----------( 221      ( 08 Jul 2018 03:22 )             38.1     07-08    136  |  92<L>  |  20  ----------------------------<  177<H>  3.7   |  27  |  3.83<H>    Ca    8.3<L>      08 Jul 2018 06:57                RADIOLOGY & ADDITIONAL TESTS:    Imaging Personally Reviewed:    Consultant(s) Notes Reviewed:      Care Discussed with Consultants/Other Providers:

## 2018-07-08 NOTE — PROGRESS NOTE ADULT - ASSESSMENT
61yo F h/o DM1 (has insulin pump, non-adherent) w frequent hospitalizations (last 05/2018) for DKA, ESRD on HD (LUE AVF, M/T/Th/Sa, last HD this morning), CAD s/p  stents, HTN HLD, CVA, PVD presents from home w nausea, anorexia, and abdominal pain. Found to be in DKA and admitted to MICU.  Now stable, tolearting PO.  Wants to be transitioned back to insulin pump.    For now will keep on basal bolus , can be transitioned to the insulin pump tonight ( 8 PM) .

## 2018-07-08 NOTE — DISCHARGE NOTE ADULT - CARE PROVIDERS DIRECT ADDRESSES
,DirectAddress_Unknown ,DirectAddress_Unknown,DirectAddress_Unknown,[email or web address]

## 2018-07-08 NOTE — PROGRESS NOTE ADULT - PROBLEM SELECTOR PLAN 1
-Advised to take humalog 3 units for lunch today and if she remains in the hospital she can be restarted on her pump at 8 PM today.  -Advised the same schedule if she is discharged later today  -Advised close follow up with her Endocrinologist Dr. Ley  -Patient is highly forgetful and she is advised to change her pump site every 3 days.

## 2018-07-08 NOTE — PROGRESS NOTE ADULT - PROBLEM SELECTOR PLAN 1
S/p HD yesterday  resume TTS schedule as outpatient  Dose meds for eGFR <15  s/p fistulogram and angioplasty in setting of suspected fistula dysfunction  Continue YOLANDA and vitamin D with HD  no renal objection to d/c planning

## 2018-07-08 NOTE — DISCHARGE NOTE ADULT - PLAN OF CARE
DKA resolved resume insulin pump   Your next appointment with endocrinologist ([facility] endocrine ph: [phone])/PMD is -   HgA1C this admission -  Make sure you get your HgA1c checked every three months.  If you take oral diabetes medications, check your blood glucose two times a day.  If you take insulin, check your blood glucose before meals and at bedtime.  It's important not to skip any meals.  Keep a log of your blood glucose results and always take it with you to your doctor appointments.  Keep a list of your current medications including injectables and over the counter medications and bring this medication list with you to all your doctor appointments.  If you have not seen your ophthalmologist this year call for appointment.  Check your feet daily for redness, sores, or openings. Do not self treat. If no improvement in two days call your primary care physician for an appointment.  Low blood sugar (hypoglycemia) is a blood sugar below 70mg/dl. Check your blood sugar if you feel signs/symptoms of hypoglycemia. If your blood sugar is below 70 take 15 grams of carbohydrates (ex 4 oz of apple juice, 3-4 glucose tablets, or 4-6 oz of regular soda) wait 15 minutes and repeat blood sugar to make sure it comes up above 70.  If your blood sugar is above 70 and you are due for a meal, have a meal.  If you are not due for a meal have a snack.  This snack helps keeps your blood sugar at a safe range. S/P fistulogram and fistuloplasty of LUE AVF 7/6.   - may access fistula for HD.   follow up with Dr. Elizalde as outpatient 1-2 weeks after discharge Vaginal bleeding resolved- Gyn evaluation as OTP resume insulin pump   Call your endocrinologist for next appt ([facility] endocrine ph: [phone])/PMD is -   HgA1C this admission -  Make sure you get your HgA1c checked every three months.  If you take oral diabetes medications, check your blood glucose two times a day.  If you take insulin, check your blood glucose before meals and at bedtime.  It's important not to skip any meals.  Keep a log of your blood glucose results and always take it with you to your doctor appointments.  Keep a list of your current medications including injectables and over the counter medications and bring this medication list with you to all your doctor appointments.  If you have not seen your ophthalmologist this year call for appointment.  Check your feet daily for redness, sores, or openings. Do not self treat. If no improvement in two days call your primary care physician for an appointment.  Low blood sugar (hypoglycemia) is a blood sugar below 70mg/dl. Check your blood sugar if you feel signs/symptoms of hypoglycemia. If your blood sugar is below 70 take 15 grams of carbohydrates (ex 4 oz of apple juice, 3-4 glucose tablets, or 4-6 oz of regular soda) wait 15 minutes and repeat blood sugar to make sure it comes up above 70.  If your blood sugar is above 70 and you are due for a meal, have a meal.  If you are not due for a meal have a snack.  This snack helps keeps your blood sugar at a safe range. resume insulin pump   Call your endocrinologist for next appt ([facility] endocrine ph: [phone])/PMD is - Dr. Ley  HgA1C this admission -  Make sure you get your HgA1c checked every three months.  If you take oral diabetes medications, check your blood glucose two times a day.  If you take insulin, check your blood glucose before meals and at bedtime.  It's important not to skip any meals.  Keep a log of your blood glucose results and always take it with you to your doctor appointments.  Keep a list of your current medications including injectables and over the counter medications and bring this medication list with you to all your doctor appointments.  If you have not seen your ophthalmologist this year call for appointment.  Check your feet daily for redness, sores, or openings. Do not self treat. If no improvement in two days call your primary care physician for an appointment.  Low blood sugar (hypoglycemia) is a blood sugar below 70mg/dl. Check your blood sugar if you feel signs/symptoms of hypoglycemia. If your blood sugar is below 70 take 15 grams of carbohydrates (ex 4 oz of apple juice, 3-4 glucose tablets, or 4-6 oz of regular soda) wait 15 minutes and repeat blood sugar to make sure it comes up above 70.  If your blood sugar is above 70 and you are due for a meal, have a meal.  If you are not due for a meal have a snack.  This snack helps keeps your blood sugar at a safe range. resume insulin pump   Endocrinologist ([facility] endocrine ph: [phone])/PMD is - Dr. Ley Appointment on 9/10 at 11:30am  HgA1C this admission -  Make sure you get your HgA1c checked every three months.  If you take oral diabetes medications, check your blood glucose two times a day.  If you take insulin, check your blood glucose before meals and at bedtime.  It's important not to skip any meals.  Keep a log of your blood glucose results and always take it with you to your doctor appointments.  Keep a list of your current medications including injectables and over the counter medications and bring this medication list with you to all your doctor appointments.  If you have not seen your ophthalmologist this year call for appointment.  Check your feet daily for redness, sores, or openings. Do not self treat. If no improvement in two days call your primary care physician for an appointment.  Low blood sugar (hypoglycemia) is a blood sugar below 70mg/dl. Check your blood sugar if you feel signs/symptoms of hypoglycemia. If your blood sugar is below 70 take 15 grams of carbohydrates (ex 4 oz of apple juice, 3-4 glucose tablets, or 4-6 oz of regular soda) wait 15 minutes and repeat blood sugar to make sure it comes up above 70.  If your blood sugar is above 70 and you are due for a meal, have a meal.  If you are not due for a meal have a snack.  This snack helps keeps your blood sugar at a safe range.

## 2018-07-08 NOTE — DISCHARGE NOTE ADULT - PATIENT PORTAL LINK FT
You can access the [facility], offered by [facility], by registering with the following website: [email or web address]

## 2018-07-08 NOTE — CHART NOTE - NSCHARTNOTEFT_GEN_A_CORE
Per RN care manager, she is unable to get confirmation for patient's home dialysis site for tomorrow and a result patient can not be discharged safely. Patient/family made aware, they are upset and initially waned to sign out AMA but decided to remain hospitalized until tomorrow. Dr Viera made aware

## 2018-07-09 VITALS
DIASTOLIC BLOOD PRESSURE: 74 MMHG | HEART RATE: 71 BPM | TEMPERATURE: 98 F | RESPIRATION RATE: 18 BRPM | SYSTOLIC BLOOD PRESSURE: 163 MMHG | OXYGEN SATURATION: 99 %

## 2018-07-09 DIAGNOSIS — Z46.81 ENCOUNTER FOR FITTING AND ADJUSTMENT OF INSULIN PUMP: ICD-10-CM

## 2018-07-09 LAB
GLUCOSE BLDC GLUCOMTR-MCNC: 237 MG/DL — HIGH (ref 70–99)
GLUCOSE BLDC GLUCOMTR-MCNC: 338 MG/DL — HIGH (ref 70–99)
GLUCOSE BLDC GLUCOMTR-MCNC: 352 MG/DL — HIGH (ref 70–99)
GLUCOSE BLDC GLUCOMTR-MCNC: 369 MG/DL — HIGH (ref 70–99)
GLUCOSE BLDC GLUCOMTR-MCNC: 549 MG/DL — CRITICAL HIGH (ref 70–99)

## 2018-07-09 PROCEDURE — 83036 HEMOGLOBIN GLYCOSYLATED A1C: CPT

## 2018-07-09 PROCEDURE — 86803 HEPATITIS C AB TEST: CPT

## 2018-07-09 PROCEDURE — 82330 ASSAY OF CALCIUM: CPT

## 2018-07-09 PROCEDURE — 83735 ASSAY OF MAGNESIUM: CPT

## 2018-07-09 PROCEDURE — 86706 HEP B SURFACE ANTIBODY: CPT

## 2018-07-09 PROCEDURE — 86704 HEP B CORE ANTIBODY TOTAL: CPT

## 2018-07-09 PROCEDURE — 87040 BLOOD CULTURE FOR BACTERIA: CPT

## 2018-07-09 PROCEDURE — 80048 BASIC METABOLIC PNL TOTAL CA: CPT

## 2018-07-09 PROCEDURE — 84295 ASSAY OF SERUM SODIUM: CPT

## 2018-07-09 PROCEDURE — 86850 RBC ANTIBODY SCREEN: CPT

## 2018-07-09 PROCEDURE — 36600 WITHDRAWAL OF ARTERIAL BLOOD: CPT

## 2018-07-09 PROCEDURE — 86901 BLOOD TYPING SEROLOGIC RH(D): CPT

## 2018-07-09 PROCEDURE — 86900 BLOOD TYPING SEROLOGIC ABO: CPT

## 2018-07-09 PROCEDURE — 86705 HEP B CORE ANTIBODY IGM: CPT

## 2018-07-09 PROCEDURE — 82962 GLUCOSE BLOOD TEST: CPT

## 2018-07-09 PROCEDURE — 84484 ASSAY OF TROPONIN QUANT: CPT

## 2018-07-09 PROCEDURE — C1769: CPT

## 2018-07-09 PROCEDURE — 76830 TRANSVAGINAL US NON-OB: CPT

## 2018-07-09 PROCEDURE — 85027 COMPLETE CBC AUTOMATED: CPT

## 2018-07-09 PROCEDURE — 84132 ASSAY OF SERUM POTASSIUM: CPT

## 2018-07-09 PROCEDURE — 86709 HEPATITIS A IGM ANTIBODY: CPT

## 2018-07-09 PROCEDURE — 83605 ASSAY OF LACTIC ACID: CPT

## 2018-07-09 PROCEDURE — 82010 KETONE BODYS QUAN: CPT

## 2018-07-09 PROCEDURE — 83690 ASSAY OF LIPASE: CPT

## 2018-07-09 PROCEDURE — 84520 ASSAY OF UREA NITROGEN: CPT

## 2018-07-09 PROCEDURE — 80053 COMPREHEN METABOLIC PANEL: CPT

## 2018-07-09 PROCEDURE — 85014 HEMATOCRIT: CPT

## 2018-07-09 PROCEDURE — 87340 HEPATITIS B SURFACE AG IA: CPT

## 2018-07-09 PROCEDURE — 93990 DOPPLER FLOW TESTING: CPT

## 2018-07-09 PROCEDURE — 36902 INTRO CATH DIALYSIS CIRCUIT: CPT

## 2018-07-09 PROCEDURE — 82947 ASSAY GLUCOSE BLOOD QUANT: CPT

## 2018-07-09 PROCEDURE — C1725: CPT

## 2018-07-09 PROCEDURE — 76705 ECHO EXAM OF ABDOMEN: CPT

## 2018-07-09 PROCEDURE — 82977 ASSAY OF GGT: CPT

## 2018-07-09 PROCEDURE — 85730 THROMBOPLASTIN TIME PARTIAL: CPT

## 2018-07-09 PROCEDURE — C1887: CPT

## 2018-07-09 PROCEDURE — 82435 ASSAY OF BLOOD CHLORIDE: CPT

## 2018-07-09 PROCEDURE — 99233 SBSQ HOSP IP/OBS HIGH 50: CPT

## 2018-07-09 PROCEDURE — 99285 EMERGENCY DEPT VISIT HI MDM: CPT | Mod: 25

## 2018-07-09 PROCEDURE — 85610 PROTHROMBIN TIME: CPT

## 2018-07-09 PROCEDURE — 99261: CPT

## 2018-07-09 PROCEDURE — 96374 THER/PROPH/DIAG INJ IV PUSH: CPT

## 2018-07-09 PROCEDURE — 82803 BLOOD GASES ANY COMBINATION: CPT

## 2018-07-09 PROCEDURE — 84100 ASSAY OF PHOSPHORUS: CPT

## 2018-07-09 RX ORDER — INSULIN LISPRO 100/ML
1 VIAL (ML) SUBCUTANEOUS
Qty: 0 | Refills: 0 | Status: DISCONTINUED | OUTPATIENT
Start: 2018-07-09 | End: 2018-07-09

## 2018-07-09 RX ORDER — ACETAMINOPHEN 500 MG
650 TABLET ORAL ONCE
Qty: 0 | Refills: 0 | Status: COMPLETED | OUTPATIENT
Start: 2018-07-09 | End: 2018-07-09

## 2018-07-09 RX ORDER — HYDRALAZINE HCL 50 MG
1 TABLET ORAL
Qty: 90 | Refills: 0 | OUTPATIENT
Start: 2018-07-09 | End: 2018-08-07

## 2018-07-09 RX ADMIN — Medication 650 MILLIGRAM(S): at 03:00

## 2018-07-09 RX ADMIN — Medication 1 DROP(S): at 05:53

## 2018-07-09 RX ADMIN — Medication 81 MILLIGRAM(S): at 12:28

## 2018-07-09 RX ADMIN — Medication 650 MILLIGRAM(S): at 02:30

## 2018-07-09 RX ADMIN — Medication 50 MILLIGRAM(S): at 05:52

## 2018-07-09 RX ADMIN — Medication 1 DROP(S): at 13:31

## 2018-07-09 RX ADMIN — OXYCODONE HYDROCHLORIDE 2.5 MILLIGRAM(S): 5 TABLET ORAL at 00:30

## 2018-07-09 RX ADMIN — OXYCODONE HYDROCHLORIDE 2.5 MILLIGRAM(S): 5 TABLET ORAL at 00:00

## 2018-07-09 RX ADMIN — Medication 5: at 08:32

## 2018-07-09 RX ADMIN — Medication 3 UNIT(S): at 08:33

## 2018-07-09 RX ADMIN — CYCLOPENTOLATE HYDROCHLORIDE 1 DROP(S): 10 SOLUTION/ DROPS OPHTHALMIC at 11:18

## 2018-07-09 RX ADMIN — Medication 1 DROP(S): at 13:30

## 2018-07-09 NOTE — PROGRESS NOTE ADULT - PROBLEM SELECTOR PLAN 1
-Test BG ac and hs  -Discontinue sq insulin  -Pt has f/u apt with Dr Ley endocrinologist  9/10/18 at 11:30am. Pt to contact endo if she has any issues with BG or insulin pump. [phone]  -Plan discussed with pt/team.  Contact info: [phone] (24/7). pager [phone]

## 2018-07-09 NOTE — PROGRESS NOTE ADULT - PROBLEM SELECTOR PROBLEM 1
Type 1 diabetes mellitus with ketoacidosis without coma
Diabetes mellitus type 1
ESRD (end stage renal disease)
ESRD (end stage renal disease)
Type 1 diabetes mellitus with ketoacidosis without coma

## 2018-07-09 NOTE — PROGRESS NOTE ADULT - PROBLEM SELECTOR PLAN 2
-Kept all preadmission settings as noted above.  -Started insulin pump today at temp basal of zero until 10pm tonight.   -Pt to bolus for food and correction of hyperglycemia during the day.  -Plan discussed with pt/team.  Contact info: [phone] (24/7). pager [phone]

## 2018-07-09 NOTE — PROGRESS NOTE ADULT - ASSESSMENT
63 y/o female with ESRD on HD, admitted with DKA/hyperkalemia requiring urgent HD, now s/p fistulogram with angioplasty  1- ESRD  2- HTN  3- DM     hd am   cont with current anti-htn meds as above  anti-htn meds reviewed with  NP  case d/w discharge care coordinators as well   cont with renvela for shpt   cont with lisinopril 40 mg daily

## 2018-07-09 NOTE — PROGRESS NOTE ADULT - SUBJECTIVE AND OBJECTIVE BOX
[facility]   [phone]  RENAL FOLLOW UP NOTE  --------------------------------------------------------------------------------  Chief Complaint:    24 hour events/subjective:    seen earlier.   states feels well and has no new c/o     PAST HISTORY  --------------------------------------------------------------------------------  No significant changes to PMH, PSH, FHx, SHx, unless otherwise noted    ALLERGIES & MEDICATIONS  --------------------------------------------------------------------------------  Allergies    No Known Allergies    Intolerances      Standing Inpatient Medications  aspirin enteric coated 81 milliGRAM(s) Oral daily  atorvastatin 20 milliGRAM(s) Oral at bedtime  clopidogrel Tablet 75 milliGRAM(s) Oral at bedtime  cyclopentolate 2% Solution 1 Drop(s) Both EYES daily  dexamethasone/neomycin/polymyxin Suspension 1 Drop(s) Left EYE every 8 hours  dextrose 5%. 1000 milliLiter(s) IV Continuous <Continuous>  dextrose 5%. 1000 milliLiter(s) IV Continuous <Continuous>  dextrose 50% Injectable 12.5 Gram(s) IV Push once  dextrose 50% Injectable 25 Gram(s) IV Push once  dextrose 50% Injectable 25 Gram(s) IV Push once  dextrose 50% Injectable 12.5 Gram(s) IV Push once  dextrose 50% Injectable 25 Gram(s) IV Push once  dextrose 50% Injectable 25 Gram(s) IV Push once  heparin  Injectable 5000 Unit(s) SubCutaneous every 12 hours  hydrALAZINE 50 milliGRAM(s) Oral every 8 hours  insulin lispro (HumaLOG) Pump 1 Each SubCutaneous Continuous Pump  ketorolac 0.5% Ophthalmic Solution 1 Drop(s) Both EYES three times a day  lisinopril 40 milliGRAM(s) Oral daily  metoprolol tartrate 25 milliGRAM(s) Oral two times a day  prednisoLONE acetate 1% Suspension 1 Drop(s) Both EYES three times a day  sodium chloride 0.9%. 1000 milliLiter(s) IV Continuous <Continuous>    PRN Inpatient Medications  dextrose 40% Gel 15 Gram(s) Oral once PRN  dextrose 40% Gel 15 Gram(s) Oral once PRN  glucagon  Injectable 1 milliGRAM(s) IntraMuscular once PRN  glucagon  Injectable 1 milliGRAM(s) IntraMuscular once PRN      REVIEW OF SYSTEMS  --------------------------------------------------------------------------------    Gen: denies fevers/chills,  CVS: denies chest pain/palpitations  Resp: denies SOB/Cough  GI: Denies N/V/Abd pain  : Denies dysuria    All other systems were reviewed and are negative, except as noted.    VITALS/PHYSICAL EXAM  --------------------------------------------------------------------------------  T(C): 36.4 (07-09-18 @ 11:21), Max: 36.9 (07-09-18 @ 05:50)  HR: 71 (07-09-18 @ 11:21) (71 - 77)  BP: 163/74 (07-09-18 @ 11:21) (128/71 - 163/74)  RR: 18 (07-09-18 @ 11:21) (18 - 18)  SpO2: 99% (07-09-18 @ 11:21) (98% - 99%)  Wt(kg): --        07-08-18 @ 07:01  -  07-09-18 @ 07:00  --------------------------------------------------------  IN: 840 mL / OUT: 0 mL / NET: 840 mL    07-09-18 @ 07:01  -  07-09-18 @ 22:38  --------------------------------------------------------  IN: 500 mL / OUT: 0 mL / NET: 500 mL      Physical Exam:  	    Physical Exam:  	Gen: alert oriented place person and date   	Pulm: DCTA no rales or ronchi or wheezing  	CV: RRR, S1/S2. no rub  	Abd: +BS, soft, nontender/nondistended  	: No suprapubic tenderness.               Extremity: No cyanosis, no edema no clubbing  	      LABS/STUDIES  --------------------------------------------------------------------------------              12.2   4.7   >-----------<  221      [07-08-18 @ 03:22]              38.1     136  |  92  |  20  ----------------------------<  177      [07-08-18 @ 06:57]  3.7   |  27  |  3.83        Ca     8.3     [07-08-18 @ 06:57]            Creatinine Trend:  SCr 3.83 [07-08 @ 06:57]  SCr 4.33 [07-07 @ 06:39]  SCr 6.48 [07-06 @ 07:26]  SCr 4.53 [07-05 @ 11:29]  SCr 3.30 [07-05 @ 00:48]                  PTH -- (Ca 8.3)      [03-03-18 @ 08:53]   134  HbA1c 7.2      [07-03-18 @ 05:01]  TSH 1.07      [12-19-17 @ 06:12]  Lipid: chol 113, TG 86, HDL 59, LDL 37      [04-30-18 @ 06:44]

## 2018-07-09 NOTE — PROGRESS NOTE ADULT - SUBJECTIVE AND OBJECTIVE BOX
Patient is a 62y old  Female who presents with a chief complaint of DKA (08 Jul 2018 11:04)      SUBJECTIVE / OVERNIGHT EVENTS: Comfortable without new complaints.   Review of Systems  chest pain no  palpitations no  sob no  nausea no  headache no    MEDICATIONS  (STANDING):  aspirin enteric coated 81 milliGRAM(s) Oral daily  atorvastatin 20 milliGRAM(s) Oral at bedtime  clopidogrel Tablet 75 milliGRAM(s) Oral at bedtime  cyclopentolate 2% Solution 1 Drop(s) Both EYES daily  dexamethasone/neomycin/polymyxin Suspension 1 Drop(s) Left EYE every 8 hours  dextrose 5%. 1000 milliLiter(s) (50 mL/Hr) IV Continuous <Continuous>  dextrose 5%. 1000 milliLiter(s) (50 mL/Hr) IV Continuous <Continuous>  dextrose 50% Injectable 12.5 Gram(s) IV Push once  dextrose 50% Injectable 25 Gram(s) IV Push once  dextrose 50% Injectable 25 Gram(s) IV Push once  dextrose 50% Injectable 12.5 Gram(s) IV Push once  dextrose 50% Injectable 25 Gram(s) IV Push once  dextrose 50% Injectable 25 Gram(s) IV Push once  heparin  Injectable 5000 Unit(s) SubCutaneous every 12 hours  hydrALAZINE 50 milliGRAM(s) Oral every 8 hours  insulin lispro (HumaLOG) Pump 1 Each SubCutaneous Continuous Pump  ketorolac 0.5% Ophthalmic Solution 1 Drop(s) Both EYES three times a day  lisinopril 40 milliGRAM(s) Oral daily  metoprolol tartrate 25 milliGRAM(s) Oral two times a day  prednisoLONE acetate 1% Suspension 1 Drop(s) Both EYES three times a day  sodium chloride 0.9%. 1000 milliLiter(s) (50 mL/Hr) IV Continuous <Continuous>    MEDICATIONS  (PRN):  dextrose 40% Gel 15 Gram(s) Oral once PRN Blood Glucose LESS THAN 70 milliGRAM(s)/deciLiter  dextrose 40% Gel 15 Gram(s) Oral once PRN Blood Glucose LESS THAN 70 milliGRAM(s)/deciliter  glucagon  Injectable 1 milliGRAM(s) IntraMuscular once PRN Glucose <70 milliGRAM(s)/deciLiter  glucagon  Injectable 1 milliGRAM(s) IntraMuscular once PRN Glucose LESS THAN 70 milligrams/deciliter          PHYSICAL EXAM:  GENERAL: NAD, well-developed  HEAD:  Atraumatic, Normocephalic  EYES: EOMI, PERRLA, conjunctiva and sclera clear  NECK: Supple, No JVD  CHEST/LUNG: Clear to auscultation bilaterally; No wheeze  HEART: Regular rate and rhythm; No murmurs, rubs, or gallops  ABDOMEN: Soft, Nontender, Nondistended; Bowel sounds present  EXTREMITIES:  2+ Peripheral Pulses, No clubbing, cyanosis, or edema L avf +thrill  PSYCH: AAOx3  NEUROLOGY: non-focal  SKIN: No rashes or lesions    LABS:                        12.2   4.7   )-----------( 221      ( 08 Jul 2018 03:22 )             38.1     07-08    136  |  92<L>  |  20  ----------------------------<  177<H>  3.7   |  27  |  3.83<H>    Ca    8.3<L>      08 Jul 2018 06:57                RADIOLOGY & ADDITIONAL TESTS:    Imaging Personally Reviewed:    Consultant(s) Notes Reviewed:      Care Discussed with Consultants/Other Providers:

## 2018-07-09 NOTE — PROGRESS NOTE ADULT - ASSESSMENT
62 f with  DKA resolved- continue Insulin pump as per endocrine  ESRD- HD. Arrangements for HD as OTP in progress.  s/p AVF reevaluation by vascular.  CAD stable  Vaginal bleeding resolved- Gyn evaluation as OTP  DC home Follow with PMD/Endocrine/Vascular in 1 week.  Pierce Viera MD pager [phone]

## 2018-07-09 NOTE — PROGRESS NOTE ADULT - SUBJECTIVE AND OBJECTIVE BOX
DIABETES FOLLOW UP NOTE: Saw pt earlier today prior discharge  INTERVAL HX: 63 y/o F w/h/o T1DM with multiple complications and comorbidities. On insulin pump at home.Well known to endocrine team due to frequent admissions. Here again with DKA after pt forgot to change insulin pump site. Going home today. BG levels out of control at hospital while on basal/bolus insulin therapy due to the fact that pt eats at different times of the day and night and she is not taking any rapid insulin during those times. Pt reports snacking on crackers and "food" overnight with FBG in 300s.  Pt eager to restart insulin pump in order to better control her BG levels. Also eager to be discharged because she has apt in her HD center this pm.      Review of Systems: "I need to leave"  Cardiovascular: No chest pain, palpitations  Respiratory: No SOB, no cough  GI: No nausea, vomiting, abdominal pain  Endocrine: no polyuria, polydipsia or S&Sx of hypoglycemia    Allergies    No Known Allergies    Intolerances      MEDICATIONS :  atorvastatin 20 milliGRAM(s) Oral at bedtime  insulin lispro (HumaLOG) Pump 1 Each SubCutaneous Continuous Pump  Pump started at following settings:  BASAL:   12MN: 0.275  5AM: 0.375  Total basal: 8.5 units  I:C; 1:15  ISF:  12MN 60  7AM 40  6PM 60  BGT:   12MN: 110-130  8AM:100-120  Active insulin: 6 hours.  TEMP BASAL until 10pm tonight set up at zero because pt received Lantus dose of 9 units last night    PHYSICAL EXAM:  VITALS: T(C): 36.4 (07-09-18 @ 11:21)  T(F): 97.5 (07-09-18 @ 11:21), Max: 98.4 (07-09-18 @ 05:50)  HR: 71 (07-09-18 @ 11:21) (71 - 81)  BP: 163/74 (07-09-18 @ 11:21) (128/71 - 163/74)  RR:  (18 - 18)  SpO2:  (98% - 99%)  Wt(kg): --  GENERAL: Female sitting in chair in NAD  Abdomen: Soft, nontender, non distended. Insulin pump place in R lower quadrant  Extremities: Warm, no edema in all 4 exts  NEURO: A&O X3    LABS:  POCT Blood Glucose.: 237 mg/dL (07-09-18 @ 12:07)  POCT Blood Glucose.: 352 mg/dL (07-09-18 @ 08:28)  POCT Blood Glucose.: 338 mg/dL (07-09-18 @ 08:26)  POCT Blood Glucose.: 369 mg/dL (07-09-18 @ 08:15)  POCT Blood Glucose.: 549 mg/dL (07-09-18 @ 08:12)  POCT Blood Glucose.: 310 mg/dL (07-08-18 @ 21:36)  POCT Blood Glucose.: 500 mg/dL (07-08-18 @ 17:08)  POCT Blood Glucose.: 71 mg/dL (07-08-18 @ 12:18)  POCT Blood Glucose.: 151 mg/dL (07-08-18 @ 07:37)  POCT Blood Glucose.: 400 mg/dL (07-08-18 @ 02:19)  POCT Blood Glucose.: 416 mg/dL (07-08-18 @ 02:06)  POCT Blood Glucose.: 449 mg/dL (07-07-18 @ 21:42)  POCT Blood Glucose.: 457 mg/dL (07-07-18 @ 21:33)  POCT Blood Glucose.: 205 mg/dL (07-07-18 @ 18:53)  POCT Blood Glucose.: 321 mg/dL (07-07-18 @ 12:02)  POCT Blood Glucose.: 194 mg/dL (07-07-18 @ 07:59)  POCT Blood Glucose.: 103 mg/dL (07-06-18 @ 23:14)  POCT Blood Glucose.: 187 mg/dL (07-06-18 @ 19:08)                            12.2   4.7   )-----------( 221      ( 08 Jul 2018 03:22 )             38.1       07-08    136  |  92<L>  |  20  ----------------------------<  177<H>  3.7   |  27  |  3.83<H>    EGFR if : 14<L>  EGFR if non : 12<L>    Ca    8.3<L>      07-08      Hemoglobin A1C, Whole Blood: 7.2 % <H> [4.0 - 5.6] (07-03-18 @ 05:01)

## 2018-07-09 NOTE — CHART NOTE - NSCHARTNOTEFT_GEN_A_CORE
Pt to be d/c per Dr. Viera. Follow ups stated on d/c document and medications reviewed with Dr. Schmidt (Renal) and Dr. Viera    Pt will be d/c home with perding HD at OP facility today.

## 2018-07-09 NOTE — PROGRESS NOTE ADULT - PROVIDER SPECIALTY LIST ADULT
Endocrinology
Internal Medicine
MICU
MICU
Nephrology
Vascular Surgery
Nephrology
MICU
Vascular Surgery
Endocrinology

## 2018-07-09 NOTE — PROGRESS NOTE ADULT - ASSESSMENT
63 y/o F w/h/o T1DM with multiple complications and comorbidities. On insulin pump at home. Well known to endocrine team due to frequent admissions. Here again with DKA after pt forgot to change insulin pump site. Going home today. BG levels out of control at hospital while on basal/bolus insulin therapy and due to erratic PO intake. Pt does better while on insulin pump. Restarted pump prior discharge at a temp basal of zero until tonight since pt received dose of Lantus last night.   Reviewed and set up insulin pump with temp basal and also assisted pt with insulin pump placement. Reminded pt of the importance of changing insulin pump site every 72 hours. Encouraged pt to [place] changing dates on a calendar or setting up an alarm on her cell phone so she doesn't forget. She is aware that this is not the first time she develops DKA due to failure to change insulin pump site. Pt verbalized understanding.  Spent over 40 minutes with pt assisting with pump insertion and setting up temp basal.

## 2018-07-10 ENCOUNTER — FORM ENCOUNTER (OUTPATIENT)
Age: 63
End: 2018-07-10

## 2018-07-11 ENCOUNTER — OUTPATIENT (OUTPATIENT)
Dept: OUTPATIENT SERVICES | Facility: HOSPITAL | Age: 63
LOS: 1 days | End: 2018-07-11
Payer: MEDICARE

## 2018-07-11 ENCOUNTER — APPOINTMENT (OUTPATIENT)
Dept: CT IMAGING | Facility: IMAGING CENTER | Age: 63
End: 2018-07-11
Payer: MEDICARE

## 2018-07-11 DIAGNOSIS — Z98.89 OTHER SPECIFIED POSTPROCEDURAL STATES: Chronic | ICD-10-CM

## 2018-07-11 DIAGNOSIS — Z00.8 ENCOUNTER FOR OTHER GENERAL EXAMINATION: ICD-10-CM

## 2018-07-11 PROCEDURE — 71250 CT THORAX DX C-: CPT | Mod: 26

## 2018-07-11 PROCEDURE — 71250 CT THORAX DX C-: CPT

## 2018-07-13 ENCOUNTER — APPOINTMENT (OUTPATIENT)
Dept: PULMONOLOGY | Facility: CLINIC | Age: 63
End: 2018-07-13

## 2018-08-17 ENCOUNTER — APPOINTMENT (OUTPATIENT)
Dept: PULMONOLOGY | Facility: CLINIC | Age: 63
End: 2018-08-17
Payer: MEDICARE

## 2018-08-17 VITALS
OXYGEN SATURATION: 98 % | HEART RATE: 89 BPM | TEMPERATURE: 98.2 F | DIASTOLIC BLOOD PRESSURE: 73 MMHG | SYSTOLIC BLOOD PRESSURE: 133 MMHG

## 2018-08-17 DIAGNOSIS — I25.10 ATHEROSCLEROTIC HEART DISEASE OF NATIVE CORONARY ARTERY W/OUT ANGINA PECTORIS: ICD-10-CM

## 2018-08-17 DIAGNOSIS — J44.89 OTHER SPECIFIED CHRONIC OBSTRUCTIVE PULMONARY DISEASE: ICD-10-CM

## 2018-08-17 PROBLEM — I87.1 COMPRESSION OF VEIN: Chronic | Status: ACTIVE | Noted: 2017-03-01

## 2018-08-17 PROBLEM — I50.9 HEART FAILURE, UNSPECIFIED: Chronic | Status: ACTIVE | Noted: 2018-07-03

## 2018-08-17 PROCEDURE — 94726 PLETHYSMOGRAPHY LUNG VOLUMES: CPT

## 2018-08-17 PROCEDURE — 94729 DIFFUSING CAPACITY: CPT

## 2018-08-17 PROCEDURE — 99213 OFFICE O/P EST LOW 20 MIN: CPT | Mod: 25

## 2018-08-17 PROCEDURE — 94060 EVALUATION OF WHEEZING: CPT

## 2018-08-17 RX ORDER — CEPHALEXIN 500 MG/1
500 CAPSULE ORAL
Qty: 14 | Refills: 0 | Status: DISCONTINUED | COMMUNITY
Start: 2017-10-23 | End: 2018-08-17

## 2018-08-17 NOTE — DIETITIAN INITIAL EVALUATION ADULT. - 35 TO
-Patient has hx of Hypertension  -She takes Labetalol 300mg 2 tabs x 3 daily and Losartan 100mg OD.  -Her BP in hospital has been stable.  -Will continue Losartan and hold Labetalol for now.  -Monitor BP, may start Labetalol if BP is elevated.  -DASH diet
1890

## 2018-08-30 ENCOUNTER — APPOINTMENT (OUTPATIENT)
Dept: THORACIC SURGERY | Facility: CLINIC | Age: 63
End: 2018-08-30

## 2018-09-04 ENCOUNTER — APPOINTMENT (OUTPATIENT)
Dept: THORACIC SURGERY | Facility: CLINIC | Age: 63
End: 2018-09-04
Payer: MEDICARE

## 2018-09-04 ENCOUNTER — APPOINTMENT (OUTPATIENT)
Dept: THORACIC SURGERY | Facility: CLINIC | Age: 63
End: 2018-09-04

## 2018-09-04 VITALS
HEIGHT: 64 IN | WEIGHT: 122 LBS | RESPIRATION RATE: 17 BRPM | SYSTOLIC BLOOD PRESSURE: 141 MMHG | DIASTOLIC BLOOD PRESSURE: 73 MMHG | HEART RATE: 78 BPM | BODY MASS INDEX: 20.83 KG/M2 | OXYGEN SATURATION: 98 %

## 2018-09-04 DIAGNOSIS — Z82.0 FAMILY HISTORY OF EPILEPSY AND OTHER DISEASES OF THE NERVOUS SYSTEM: ICD-10-CM

## 2018-09-04 DIAGNOSIS — Z86.39 PERSONAL HISTORY OF OTHER ENDOCRINE, NUTRITIONAL AND METABOLIC DISEASE: ICD-10-CM

## 2018-09-04 DIAGNOSIS — Z80.1 FAMILY HISTORY OF MALIGNANT NEOPLASM OF TRACHEA, BRONCHUS AND LUNG: ICD-10-CM

## 2018-09-04 DIAGNOSIS — I10 ESSENTIAL (PRIMARY) HYPERTENSION: ICD-10-CM

## 2018-09-04 DIAGNOSIS — M79.605 PAIN IN RIGHT LEG: ICD-10-CM

## 2018-09-04 DIAGNOSIS — Z86.73 PERSONAL HISTORY OF TRANSIENT ISCHEMIC ATTACK (TIA), AND CEREBRAL INFARCTION W/OUT RESIDUAL DEFICITS: ICD-10-CM

## 2018-09-04 DIAGNOSIS — Z78.9 OTHER SPECIFIED HEALTH STATUS: ICD-10-CM

## 2018-09-04 DIAGNOSIS — M79.604 PAIN IN RIGHT LEG: ICD-10-CM

## 2018-09-04 DIAGNOSIS — Z87.891 PERSONAL HISTORY OF NICOTINE DEPENDENCE: ICD-10-CM

## 2018-09-04 DIAGNOSIS — N18.6 END STAGE RENAL DISEASE: ICD-10-CM

## 2018-09-04 DIAGNOSIS — G62.9 POLYNEUROPATHY, UNSPECIFIED: ICD-10-CM

## 2018-09-04 DIAGNOSIS — Z99.2 END STAGE RENAL DISEASE: ICD-10-CM

## 2018-09-04 DIAGNOSIS — I25.2 OLD MYOCARDIAL INFARCTION: ICD-10-CM

## 2018-09-04 PROCEDURE — 99205 OFFICE O/P NEW HI 60 MIN: CPT

## 2018-09-14 ENCOUNTER — APPOINTMENT (OUTPATIENT)
Dept: PULMONOLOGY | Facility: CLINIC | Age: 63
End: 2018-09-14
Payer: MEDICARE

## 2018-09-14 VITALS
DIASTOLIC BLOOD PRESSURE: 76 MMHG | HEART RATE: 75 BPM | RESPIRATION RATE: 16 BRPM | SYSTOLIC BLOOD PRESSURE: 166 MMHG | OXYGEN SATURATION: 100 %

## 2018-09-14 PROCEDURE — 99214 OFFICE O/P EST MOD 30 MIN: CPT | Mod: 25

## 2018-09-14 PROCEDURE — 94060 EVALUATION OF WHEEZING: CPT

## 2018-10-12 ENCOUNTER — APPOINTMENT (OUTPATIENT)
Dept: PULMONOLOGY | Facility: CLINIC | Age: 63
End: 2018-10-12
Payer: MEDICARE

## 2018-10-12 VITALS
HEART RATE: 73 BPM | HEIGHT: 64 IN | DIASTOLIC BLOOD PRESSURE: 68 MMHG | RESPIRATION RATE: 14 BRPM | SYSTOLIC BLOOD PRESSURE: 137 MMHG | BODY MASS INDEX: 20.14 KG/M2 | OXYGEN SATURATION: 100 % | WEIGHT: 118 LBS

## 2018-10-12 PROCEDURE — 99213 OFFICE O/P EST LOW 20 MIN: CPT | Mod: 25

## 2018-10-12 PROCEDURE — 94060 EVALUATION OF WHEEZING: CPT

## 2018-10-13 ENCOUNTER — INPATIENT (INPATIENT)
Facility: HOSPITAL | Age: 63
LOS: 3 days | Discharge: HOME | DRG: 637 | End: 2018-10-17
Attending: INTERNAL MEDICINE | Admitting: INTERNAL MEDICINE
Payer: MEDICARE

## 2018-10-13 VITALS
OXYGEN SATURATION: 98 % | HEIGHT: 63 IN | DIASTOLIC BLOOD PRESSURE: 70 MMHG | TEMPERATURE: 98 F | HEART RATE: 81 BPM | SYSTOLIC BLOOD PRESSURE: 183 MMHG | WEIGHT: 117.95 LBS | RESPIRATION RATE: 18 BRPM

## 2018-10-13 DIAGNOSIS — E78.49 OTHER HYPERLIPIDEMIA: ICD-10-CM

## 2018-10-13 DIAGNOSIS — Z98.89 OTHER SPECIFIED POSTPROCEDURAL STATES: Chronic | ICD-10-CM

## 2018-10-13 DIAGNOSIS — N18.6 END STAGE RENAL DISEASE: ICD-10-CM

## 2018-10-13 DIAGNOSIS — I10 ESSENTIAL (PRIMARY) HYPERTENSION: ICD-10-CM

## 2018-10-13 DIAGNOSIS — R73.9 HYPERGLYCEMIA, UNSPECIFIED: ICD-10-CM

## 2018-10-13 DIAGNOSIS — E10.10 TYPE 1 DIABETES MELLITUS WITH KETOACIDOSIS WITHOUT COMA: ICD-10-CM

## 2018-10-13 LAB
ALBUMIN SERPL ELPH-MCNC: 3.9 G/DL — SIGNIFICANT CHANGE UP (ref 3.3–5)
ALBUMIN SERPL ELPH-MCNC: 4.6 G/DL — SIGNIFICANT CHANGE UP (ref 3.3–5)
ALP SERPL-CCNC: 121 U/L — HIGH (ref 40–120)
ALP SERPL-CCNC: 142 U/L — HIGH (ref 40–120)
ALT FLD-CCNC: 8 U/L — LOW (ref 10–45)
ALT FLD-CCNC: 9 U/L — LOW (ref 10–45)
ANION GAP SERPL CALC-SCNC: 14 MMOL/L — SIGNIFICANT CHANGE UP (ref 5–17)
ANION GAP SERPL CALC-SCNC: 17 MMOL/L — SIGNIFICANT CHANGE UP (ref 5–17)
ANION GAP SERPL CALC-SCNC: 19 MMOL/L — HIGH (ref 5–17)
ANION GAP SERPL CALC-SCNC: 27 MMOL/L — HIGH (ref 5–17)
APTT BLD: 28.5 SEC — SIGNIFICANT CHANGE UP (ref 27.5–37.4)
AST SERPL-CCNC: 15 U/L — SIGNIFICANT CHANGE UP (ref 10–40)
AST SERPL-CCNC: 16 U/L — SIGNIFICANT CHANGE UP (ref 10–40)
B-OH-BUTYR SERPL-SCNC: 0.1 MMOL/L — SIGNIFICANT CHANGE UP
B-OH-BUTYR SERPL-SCNC: 0.2 MMOL/L — SIGNIFICANT CHANGE UP
B-OH-BUTYR SERPL-SCNC: 2.4 MMOL/L — HIGH
B-OH-BUTYR SERPL-SCNC: 2.6 MMOL/L — HIGH
BASE EXCESS BLDV CALC-SCNC: 4.9 MMOL/L — HIGH (ref -2–2)
BASE EXCESS BLDV CALC-SCNC: 6.2 MMOL/L — HIGH (ref -2–2)
BASE EXCESS BLDV CALC-SCNC: 7.6 MMOL/L — HIGH (ref -2–2)
BASE EXCESS BLDV CALC-SCNC: 8.3 MMOL/L — HIGH (ref -2–2)
BASOPHILS # BLD AUTO: 0 K/UL — SIGNIFICANT CHANGE UP (ref 0–0.2)
BASOPHILS # BLD AUTO: 0 K/UL — SIGNIFICANT CHANGE UP (ref 0–0.2)
BASOPHILS NFR BLD AUTO: 0.2 % — SIGNIFICANT CHANGE UP (ref 0–2)
BASOPHILS NFR BLD AUTO: 0.3 % — SIGNIFICANT CHANGE UP (ref 0–2)
BILIRUB SERPL-MCNC: 0.4 MG/DL — SIGNIFICANT CHANGE UP (ref 0.2–1.2)
BILIRUB SERPL-MCNC: 0.5 MG/DL — SIGNIFICANT CHANGE UP (ref 0.2–1.2)
BUN SERPL-MCNC: 21 MG/DL — SIGNIFICANT CHANGE UP (ref 7–23)
BUN SERPL-MCNC: 32 MG/DL — HIGH (ref 7–23)
BUN SERPL-MCNC: 34 MG/DL — HIGH (ref 7–23)
BUN SERPL-MCNC: 8 MG/DL — SIGNIFICANT CHANGE UP (ref 7–23)
CA-I SERPL-SCNC: 1.03 MMOL/L — LOW (ref 1.12–1.3)
CA-I SERPL-SCNC: 1.04 MMOL/L — LOW (ref 1.12–1.3)
CA-I SERPL-SCNC: 1.05 MMOL/L — LOW (ref 1.12–1.3)
CA-I SERPL-SCNC: 1.05 MMOL/L — LOW (ref 1.12–1.3)
CALCIUM SERPL-MCNC: 7.9 MG/DL — LOW (ref 8.4–10.5)
CALCIUM SERPL-MCNC: 8.1 MG/DL — LOW (ref 8.4–10.5)
CALCIUM SERPL-MCNC: 8.2 MG/DL — LOW (ref 8.4–10.5)
CALCIUM SERPL-MCNC: 8.4 MG/DL — SIGNIFICANT CHANGE UP (ref 8.4–10.5)
CHLORIDE BLDV-SCNC: 101 MMOL/L — SIGNIFICANT CHANGE UP (ref 96–108)
CHLORIDE BLDV-SCNC: 93 MMOL/L — LOW (ref 96–108)
CHLORIDE BLDV-SCNC: 97 MMOL/L — SIGNIFICANT CHANGE UP (ref 96–108)
CHLORIDE BLDV-SCNC: 99 MMOL/L — SIGNIFICANT CHANGE UP (ref 96–108)
CHLORIDE SERPL-SCNC: 82 MMOL/L — LOW (ref 96–108)
CHLORIDE SERPL-SCNC: 90 MMOL/L — LOW (ref 96–108)
CHLORIDE SERPL-SCNC: 96 MMOL/L — SIGNIFICANT CHANGE UP (ref 96–108)
CHLORIDE SERPL-SCNC: 97 MMOL/L — SIGNIFICANT CHANGE UP (ref 96–108)
CO2 BLDV-SCNC: 32 MMOL/L — HIGH (ref 22–30)
CO2 BLDV-SCNC: 36 MMOL/L — HIGH (ref 22–30)
CO2 SERPL-SCNC: 23 MMOL/L — SIGNIFICANT CHANGE UP (ref 22–31)
CO2 SERPL-SCNC: 25 MMOL/L — SIGNIFICANT CHANGE UP (ref 22–31)
CO2 SERPL-SCNC: 26 MMOL/L — SIGNIFICANT CHANGE UP (ref 22–31)
CO2 SERPL-SCNC: 29 MMOL/L — SIGNIFICANT CHANGE UP (ref 22–31)
CREAT SERPL-MCNC: 2.5 MG/DL — HIGH (ref 0.5–1.3)
CREAT SERPL-MCNC: 4.45 MG/DL — HIGH (ref 0.5–1.3)
CREAT SERPL-MCNC: 6.67 MG/DL — HIGH (ref 0.5–1.3)
CREAT SERPL-MCNC: 6.88 MG/DL — HIGH (ref 0.5–1.3)
EOSINOPHIL # BLD AUTO: 0 K/UL — SIGNIFICANT CHANGE UP (ref 0–0.5)
EOSINOPHIL # BLD AUTO: 0.1 K/UL — SIGNIFICANT CHANGE UP (ref 0–0.5)
EOSINOPHIL NFR BLD AUTO: 0.5 % — SIGNIFICANT CHANGE UP (ref 0–6)
EOSINOPHIL NFR BLD AUTO: 1.8 % — SIGNIFICANT CHANGE UP (ref 0–6)
GAS PNL BLDV: 133 MMOL/L — LOW (ref 136–145)
GAS PNL BLDV: 135 MMOL/L — LOW (ref 136–145)
GAS PNL BLDV: 136 MMOL/L — SIGNIFICANT CHANGE UP (ref 136–145)
GAS PNL BLDV: 136 MMOL/L — SIGNIFICANT CHANGE UP (ref 136–145)
GAS PNL BLDV: SIGNIFICANT CHANGE UP
GLUCOSE BLDC GLUCOMTR-MCNC: 109 MG/DL — HIGH (ref 70–99)
GLUCOSE BLDC GLUCOMTR-MCNC: 126 MG/DL — HIGH (ref 70–99)
GLUCOSE BLDC GLUCOMTR-MCNC: 140 MG/DL — HIGH (ref 70–99)
GLUCOSE BLDC GLUCOMTR-MCNC: 141 MG/DL — HIGH (ref 70–99)
GLUCOSE BLDC GLUCOMTR-MCNC: 147 MG/DL — HIGH (ref 70–99)
GLUCOSE BLDC GLUCOMTR-MCNC: 147 MG/DL — HIGH (ref 70–99)
GLUCOSE BLDC GLUCOMTR-MCNC: 160 MG/DL — HIGH (ref 70–99)
GLUCOSE BLDC GLUCOMTR-MCNC: 188 MG/DL — HIGH (ref 70–99)
GLUCOSE BLDC GLUCOMTR-MCNC: 199 MG/DL — HIGH (ref 70–99)
GLUCOSE BLDC GLUCOMTR-MCNC: 216 MG/DL — HIGH (ref 70–99)
GLUCOSE BLDC GLUCOMTR-MCNC: 293 MG/DL — HIGH (ref 70–99)
GLUCOSE BLDC GLUCOMTR-MCNC: 378 MG/DL — HIGH (ref 70–99)
GLUCOSE BLDV-MCNC: 106 MG/DL — HIGH (ref 70–99)
GLUCOSE BLDV-MCNC: 126 MG/DL — HIGH (ref 70–99)
GLUCOSE BLDV-MCNC: 146 MG/DL — HIGH (ref 70–99)
GLUCOSE BLDV-MCNC: 327 MG/DL — HIGH (ref 70–99)
GLUCOSE SERPL-MCNC: 110 MG/DL — HIGH (ref 70–99)
GLUCOSE SERPL-MCNC: 152 MG/DL — HIGH (ref 70–99)
GLUCOSE SERPL-MCNC: 326 MG/DL — HIGH (ref 70–99)
GLUCOSE SERPL-MCNC: 663 MG/DL — CRITICAL HIGH (ref 70–99)
HCO3 BLDV-SCNC: 30 MMOL/L — HIGH (ref 21–29)
HCO3 BLDV-SCNC: 30 MMOL/L — HIGH (ref 21–29)
HCO3 BLDV-SCNC: 31 MMOL/L — HIGH (ref 21–29)
HCO3 BLDV-SCNC: 34 MMOL/L — HIGH (ref 21–29)
HCT VFR BLD CALC: 28.6 % — LOW (ref 34.5–45)
HCT VFR BLD CALC: 32.5 % — LOW (ref 34.5–45)
HCT VFR BLDA CALC: 28 % — LOW (ref 39–50)
HCT VFR BLDA CALC: 29 % — LOW (ref 39–50)
HCT VFR BLDA CALC: 30 % — LOW (ref 39–50)
HCT VFR BLDA CALC: 30 % — LOW (ref 39–50)
HGB BLD CALC-MCNC: 9 G/DL — LOW (ref 11.5–15.5)
HGB BLD CALC-MCNC: 9.3 G/DL — LOW (ref 11.5–15.5)
HGB BLD CALC-MCNC: 9.5 G/DL — LOW (ref 11.5–15.5)
HGB BLD CALC-MCNC: 9.6 G/DL — LOW (ref 11.5–15.5)
HGB BLD-MCNC: 10.6 G/DL — LOW (ref 11.5–15.5)
HGB BLD-MCNC: 9.5 G/DL — LOW (ref 11.5–15.5)
HOROWITZ INDEX BLDV+IHG-RTO: 21 — SIGNIFICANT CHANGE UP
INR BLD: 1.05 RATIO — SIGNIFICANT CHANGE UP (ref 0.88–1.16)
LACTATE BLDV-MCNC: 0.6 MMOL/L — LOW (ref 0.7–2)
LACTATE BLDV-MCNC: 0.8 MMOL/L — SIGNIFICANT CHANGE UP (ref 0.7–2)
LACTATE BLDV-MCNC: 1.5 MMOL/L — SIGNIFICANT CHANGE UP (ref 0.7–2)
LACTATE BLDV-MCNC: 2.9 MMOL/L — HIGH (ref 0.7–2)
LYMPHOCYTES # BLD AUTO: 0.5 K/UL — LOW (ref 1–3.3)
LYMPHOCYTES # BLD AUTO: 0.6 K/UL — LOW (ref 1–3.3)
LYMPHOCYTES # BLD AUTO: 11 % — LOW (ref 13–44)
LYMPHOCYTES # BLD AUTO: 5.9 % — LOW (ref 13–44)
MAGNESIUM SERPL-MCNC: 2 MG/DL — SIGNIFICANT CHANGE UP (ref 1.6–2.6)
MAGNESIUM SERPL-MCNC: 2.2 MG/DL — SIGNIFICANT CHANGE UP (ref 1.6–2.6)
MAGNESIUM SERPL-MCNC: 2.6 MG/DL — SIGNIFICANT CHANGE UP (ref 1.6–2.6)
MCHC RBC-ENTMCNC: 32.4 PG — SIGNIFICANT CHANGE UP (ref 27–34)
MCHC RBC-ENTMCNC: 32.7 GM/DL — SIGNIFICANT CHANGE UP (ref 32–36)
MCHC RBC-ENTMCNC: 32.7 PG — SIGNIFICANT CHANGE UP (ref 27–34)
MCHC RBC-ENTMCNC: 33.3 GM/DL — SIGNIFICANT CHANGE UP (ref 32–36)
MCV RBC AUTO: 98.3 FL — SIGNIFICANT CHANGE UP (ref 80–100)
MCV RBC AUTO: 99 FL — SIGNIFICANT CHANGE UP (ref 80–100)
MONOCYTES # BLD AUTO: 0.5 K/UL — SIGNIFICANT CHANGE UP (ref 0–0.9)
MONOCYTES # BLD AUTO: 0.5 K/UL — SIGNIFICANT CHANGE UP (ref 0–0.9)
MONOCYTES NFR BLD AUTO: 6 % — SIGNIFICANT CHANGE UP (ref 2–14)
MONOCYTES NFR BLD AUTO: 7.9 % — SIGNIFICANT CHANGE UP (ref 2–14)
NEUTROPHILS # BLD AUTO: 4.6 K/UL — SIGNIFICANT CHANGE UP (ref 1.8–7.4)
NEUTROPHILS # BLD AUTO: 7.1 K/UL — SIGNIFICANT CHANGE UP (ref 1.8–7.4)
NEUTROPHILS NFR BLD AUTO: 79.1 % — HIGH (ref 43–77)
NEUTROPHILS NFR BLD AUTO: 87.4 % — HIGH (ref 43–77)
OSMOLALITY SERPL: 287 MOS/KG — SIGNIFICANT CHANGE UP (ref 275–300)
OSMOLALITY SERPL: 289 MOS/KG — SIGNIFICANT CHANGE UP (ref 275–300)
OSMOLALITY SERPL: 305 MOS/KG — HIGH (ref 275–300)
OSMOLALITY SERPL: 316 MOS/KG — HIGH (ref 275–300)
OTHER CELLS CSF MANUAL: 10 ML/DL — LOW (ref 18–22)
OTHER CELLS CSF MANUAL: 12 ML/DL — LOW (ref 18–22)
OTHER CELLS CSF MANUAL: 5 ML/DL — LOW (ref 18–22)
OTHER CELLS CSF MANUAL: 8 ML/DL — LOW (ref 18–22)
PCO2 BLDV: 41 MMHG — SIGNIFICANT CHANGE UP (ref 35–50)
PCO2 BLDV: 44 MMHG — SIGNIFICANT CHANGE UP (ref 35–50)
PCO2 BLDV: 50 MMHG — SIGNIFICANT CHANGE UP (ref 35–50)
PCO2 BLDV: 55 MMHG — HIGH (ref 35–50)
PH BLDV: 7.39 — SIGNIFICANT CHANGE UP (ref 7.35–7.45)
PH BLDV: 7.4 — SIGNIFICANT CHANGE UP (ref 7.35–7.45)
PH BLDV: 7.45 — SIGNIFICANT CHANGE UP (ref 7.35–7.45)
PH BLDV: 7.5 — HIGH (ref 7.35–7.45)
PHOSPHATE SERPL-MCNC: 2.1 MG/DL — LOW (ref 2.5–4.5)
PHOSPHATE SERPL-MCNC: 2.6 MG/DL — SIGNIFICANT CHANGE UP (ref 2.5–4.5)
PHOSPHATE SERPL-MCNC: 4.4 MG/DL — SIGNIFICANT CHANGE UP (ref 2.5–4.5)
PLATELET # BLD AUTO: 213 K/UL — SIGNIFICANT CHANGE UP (ref 150–400)
PLATELET # BLD AUTO: 258 K/UL — SIGNIFICANT CHANGE UP (ref 150–400)
PO2 BLDV: 22 MMHG — LOW (ref 25–45)
PO2 BLDV: 34 MMHG — SIGNIFICANT CHANGE UP (ref 25–45)
PO2 BLDV: 40 MMHG — SIGNIFICANT CHANGE UP (ref 25–45)
PO2 BLDV: 76 MMHG — HIGH (ref 25–45)
POTASSIUM BLDV-SCNC: 3.4 MMOL/L — LOW (ref 3.5–5.3)
POTASSIUM BLDV-SCNC: 3.4 MMOL/L — LOW (ref 3.5–5.3)
POTASSIUM BLDV-SCNC: 4.2 MMOL/L — SIGNIFICANT CHANGE UP (ref 3.5–5.3)
POTASSIUM BLDV-SCNC: 5.1 MMOL/L — SIGNIFICANT CHANGE UP (ref 3.5–5.3)
POTASSIUM SERPL-MCNC: 3.6 MMOL/L — SIGNIFICANT CHANGE UP (ref 3.5–5.3)
POTASSIUM SERPL-MCNC: 3.7 MMOL/L — SIGNIFICANT CHANGE UP (ref 3.5–5.3)
POTASSIUM SERPL-MCNC: 5.4 MMOL/L — HIGH (ref 3.5–5.3)
POTASSIUM SERPL-MCNC: 5.5 MMOL/L — HIGH (ref 3.5–5.3)
POTASSIUM SERPL-SCNC: 3.6 MMOL/L — SIGNIFICANT CHANGE UP (ref 3.5–5.3)
POTASSIUM SERPL-SCNC: 3.7 MMOL/L — SIGNIFICANT CHANGE UP (ref 3.5–5.3)
POTASSIUM SERPL-SCNC: 5.4 MMOL/L — HIGH (ref 3.5–5.3)
POTASSIUM SERPL-SCNC: 5.5 MMOL/L — HIGH (ref 3.5–5.3)
PROT SERPL-MCNC: 6.8 G/DL — SIGNIFICANT CHANGE UP (ref 6–8.3)
PROT SERPL-MCNC: 8 G/DL — SIGNIFICANT CHANGE UP (ref 6–8.3)
PROTHROM AB SERPL-ACNC: 11.4 SEC — SIGNIFICANT CHANGE UP (ref 9.8–12.7)
RAPID RVP RESULT: SIGNIFICANT CHANGE UP
RBC # BLD: 2.91 M/UL — LOW (ref 3.8–5.2)
RBC # BLD: 3.28 M/UL — LOW (ref 3.8–5.2)
RBC # FLD: 13.5 % — SIGNIFICANT CHANGE UP (ref 10.3–14.5)
RBC # FLD: 13.7 % — SIGNIFICANT CHANGE UP (ref 10.3–14.5)
SAO2 % BLDV: 36 % — LOW (ref 67–88)
SAO2 % BLDV: 57 % — LOW (ref 67–88)
SAO2 % BLDV: 74 % — SIGNIFICANT CHANGE UP (ref 67–88)
SAO2 % BLDV: 97 % — HIGH (ref 67–88)
SODIUM SERPL-SCNC: 132 MMOL/L — LOW (ref 135–145)
SODIUM SERPL-SCNC: 134 MMOL/L — LOW (ref 135–145)
SODIUM SERPL-SCNC: 139 MMOL/L — SIGNIFICANT CHANGE UP (ref 135–145)
SODIUM SERPL-SCNC: 140 MMOL/L — SIGNIFICANT CHANGE UP (ref 135–145)
WBC # BLD: 5.8 K/UL — SIGNIFICANT CHANGE UP (ref 3.8–10.5)
WBC # BLD: 8.1 K/UL — SIGNIFICANT CHANGE UP (ref 3.8–10.5)
WBC # FLD AUTO: 5.8 K/UL — SIGNIFICANT CHANGE UP (ref 3.8–10.5)
WBC # FLD AUTO: 8.1 K/UL — SIGNIFICANT CHANGE UP (ref 3.8–10.5)

## 2018-10-13 PROCEDURE — 99223 1ST HOSP IP/OBS HIGH 75: CPT | Mod: GC

## 2018-10-13 PROCEDURE — 99291 CRITICAL CARE FIRST HOUR: CPT

## 2018-10-13 PROCEDURE — 93010 ELECTROCARDIOGRAM REPORT: CPT

## 2018-10-13 PROCEDURE — 71045 X-RAY EXAM CHEST 1 VIEW: CPT | Mod: 26

## 2018-10-13 PROCEDURE — 99291 CRITICAL CARE FIRST HOUR: CPT | Mod: GC

## 2018-10-13 RX ORDER — HEPARIN SODIUM 5000 [USP'U]/ML
5000 INJECTION INTRAVENOUS; SUBCUTANEOUS EVERY 8 HOURS
Qty: 0 | Refills: 0 | Status: DISCONTINUED | OUTPATIENT
Start: 2018-10-13 | End: 2018-10-17

## 2018-10-13 RX ORDER — SODIUM CHLORIDE 9 MG/ML
1000 INJECTION INTRAMUSCULAR; INTRAVENOUS; SUBCUTANEOUS ONCE
Qty: 0 | Refills: 0 | Status: COMPLETED | OUTPATIENT
Start: 2018-10-13 | End: 2018-10-13

## 2018-10-13 RX ORDER — ASPIRIN/CALCIUM CARB/MAGNESIUM 324 MG
81 TABLET ORAL DAILY
Qty: 0 | Refills: 0 | Status: DISCONTINUED | OUTPATIENT
Start: 2018-10-13 | End: 2018-10-17

## 2018-10-13 RX ORDER — SODIUM CHLORIDE 9 MG/ML
1000 INJECTION, SOLUTION INTRAVENOUS
Qty: 0 | Refills: 0 | Status: DISCONTINUED | OUTPATIENT
Start: 2018-10-13 | End: 2018-10-13

## 2018-10-13 RX ORDER — INSULIN HUMAN 100 [IU]/ML
0.1 INJECTION, SOLUTION SUBCUTANEOUS
Qty: 100 | Refills: 0 | Status: DISCONTINUED | OUTPATIENT
Start: 2018-10-13 | End: 2018-10-13

## 2018-10-13 RX ORDER — PANTOPRAZOLE SODIUM 20 MG/1
40 TABLET, DELAYED RELEASE ORAL DAILY
Qty: 0 | Refills: 0 | Status: DISCONTINUED | OUTPATIENT
Start: 2018-10-13 | End: 2018-10-14

## 2018-10-13 RX ORDER — CLOPIDOGREL BISULFATE 75 MG/1
75 TABLET, FILM COATED ORAL DAILY
Qty: 0 | Refills: 0 | Status: DISCONTINUED | OUTPATIENT
Start: 2018-10-13 | End: 2018-10-17

## 2018-10-13 RX ORDER — POTASSIUM PHOSPHATE, MONOBASIC POTASSIUM PHOSPHATE, DIBASIC 236; 224 MG/ML; MG/ML
15 INJECTION, SOLUTION INTRAVENOUS ONCE
Qty: 0 | Refills: 0 | Status: COMPLETED | OUTPATIENT
Start: 2018-10-13 | End: 2018-10-13

## 2018-10-13 RX ORDER — HYDRALAZINE HCL 50 MG
25 TABLET ORAL EVERY 8 HOURS
Qty: 0 | Refills: 0 | Status: DISCONTINUED | OUTPATIENT
Start: 2018-10-13 | End: 2018-10-14

## 2018-10-13 RX ORDER — DEXTROSE 10 % IN WATER 10 %
1000 INTRAVENOUS SOLUTION INTRAVENOUS
Qty: 0 | Refills: 0 | Status: DISCONTINUED | OUTPATIENT
Start: 2018-10-13 | End: 2018-10-14

## 2018-10-13 RX ORDER — ATORVASTATIN CALCIUM 80 MG/1
20 TABLET, FILM COATED ORAL AT BEDTIME
Qty: 0 | Refills: 0 | Status: DISCONTINUED | OUTPATIENT
Start: 2018-10-13 | End: 2018-10-17

## 2018-10-13 RX ORDER — INSULIN HUMAN 100 [IU]/ML
0.5 INJECTION, SOLUTION SUBCUTANEOUS
Qty: 100 | Refills: 0 | Status: DISCONTINUED | OUTPATIENT
Start: 2018-10-13 | End: 2018-10-14

## 2018-10-13 RX ORDER — SODIUM CHLORIDE 9 MG/ML
1000 INJECTION INTRAMUSCULAR; INTRAVENOUS; SUBCUTANEOUS
Qty: 0 | Refills: 0 | Status: DISCONTINUED | OUTPATIENT
Start: 2018-10-13 | End: 2018-10-13

## 2018-10-13 RX ADMIN — CLOPIDOGREL BISULFATE 75 MILLIGRAM(S): 75 TABLET, FILM COATED ORAL at 17:15

## 2018-10-13 RX ADMIN — Medication 81 MILLIGRAM(S): at 17:15

## 2018-10-13 RX ADMIN — INSULIN HUMAN 0.5 UNIT(S)/HR: 100 INJECTION, SOLUTION SUBCUTANEOUS at 15:15

## 2018-10-13 RX ADMIN — INSULIN HUMAN 0.5 UNIT(S)/HR: 100 INJECTION, SOLUTION SUBCUTANEOUS at 17:15

## 2018-10-13 RX ADMIN — SODIUM CHLORIDE 75 MILLILITER(S): 9 INJECTION, SOLUTION INTRAVENOUS at 15:16

## 2018-10-13 RX ADMIN — Medication 25 MILLIGRAM(S): at 17:15

## 2018-10-13 RX ADMIN — Medication 30 MILLILITER(S): at 17:57

## 2018-10-13 RX ADMIN — SODIUM CHLORIDE 1000 MILLILITER(S): 9 INJECTION INTRAMUSCULAR; INTRAVENOUS; SUBCUTANEOUS at 09:28

## 2018-10-13 RX ADMIN — INSULIN HUMAN 2 UNIT(S)/HR: 100 INJECTION, SOLUTION SUBCUTANEOUS at 12:30

## 2018-10-13 RX ADMIN — POTASSIUM PHOSPHATE, MONOBASIC POTASSIUM PHOSPHATE, DIBASIC 62.5 MILLIMOLE(S): 236; 224 INJECTION, SOLUTION INTRAVENOUS at 22:49

## 2018-10-13 RX ADMIN — SODIUM CHLORIDE 125 MILLILITER(S): 9 INJECTION, SOLUTION INTRAVENOUS at 16:16

## 2018-10-13 RX ADMIN — SODIUM CHLORIDE 75 MILLILITER(S): 9 INJECTION INTRAMUSCULAR; INTRAVENOUS; SUBCUTANEOUS at 13:58

## 2018-10-13 RX ADMIN — ATORVASTATIN CALCIUM 20 MILLIGRAM(S): 80 TABLET, FILM COATED ORAL at 21:08

## 2018-10-13 RX ADMIN — INSULIN HUMAN 5.3 UNIT(S)/HR: 100 INJECTION, SOLUTION SUBCUTANEOUS at 11:25

## 2018-10-13 NOTE — ED ADULT NURSE NOTE - NSIMPLEMENTINTERV_GEN_ALL_ED
Implemented All Universal Safety Interventions:  [place] to call system. Call bell, personal items and telephone within reach. Instruct patient to call for assistance. Room bathroom lighting operational. Non-slip footwear when patient is off stretcher. Physically safe environment: no spills, clutter or unnecessary equipment. Stretcher in lowest position, wheels locked, appropriate side rails in place.

## 2018-10-13 NOTE — H&P ADULT - GASTROINTESTINAL DETAILS
bowel sounds normal/no organomegaly/no bruit/no rebound tenderness/soft/no rigidity/normal/no distention/no masses palpable/no guarding/nontender

## 2018-10-13 NOTE — PROVIDER CONTACT NOTE (OTHER) - BACKGROUND
Pt, admitted to MICU w/ DKA vs. HHS, on insulin gtt now AG closed, plan to bridge to insulin pump in am

## 2018-10-13 NOTE — PROVIDER CONTACT NOTE (OTHER) - SITUATION
Pt., w/ 1 IV access currently receiving insulin gtt & D10 gtt together, now ordered for TOSHIA Julian

## 2018-10-13 NOTE — H&P ADULT - ASSESSMENT
Plan:  #Neuro:  -neuro checks q 2 hrs and prn for changes  -bedrest at present  -physical therapy consult when condition improved    #Pulm:  -supplemental O2 as needed to maintain SPO2 > 92%  -incentive spirometry 10x q 2 hrs  -HOB >/= 30 degree angle    #CV:  -Hx of HFrEF, CAD s/p stents  -obtain cardiac enzymes now and q 8 hrs x3  -ecg now and q am x 3 days  -continue ASA 81 mg po qd  -continue plavix 75 mg po qd  -continue atorvastin 20 mg qhs    #GI/:  -NPO at present except meds  -protonix 40 mg IV qd  -strict I & O's  -contact Renal attending - as pt is ESRD on HD M/T/TH/Sat will need H.D. therapy  -when HHS/DKA controlled start no concentrated sweet/renal diet as tolerated    #I.D.:  -pt currently without leukocytosis or febrile  -pan culture for surveillance   -no need for antibx at present    #FEN/ENDO/HEME:  -cmp/mg++/po--4/cbc/coags/ketones/abg/osmololity now  -bmp/mg++/po--4/acetone/vbg/osmololity q 4hrs  -as pt is ESRD will add NS at 75cc/hr - plan for H.D.  -place on Insulin gtt - titrate to glucose 160 to 180, AG <15, HCO3 >18, ph>7.3 Plan:  #Neuro:  -neuro checks q 2 hrs and prn for changes  -bedrest at present  -physical therapy consult when condition improved    #Pulm:  -supplemental O2 as needed to maintain SPO2 > 92%  -incentive spirometry 10x q 2 hrs  -HOB >/= 30 degree angle    #CV:  -Hx of HFrEF, CAD s/p stents  -obtain cardiac enzymes now and q 8 hrs x3  -ecg now and q am x 3 days  -continue ASA 81 mg po qd  -continue plavix 75 mg po qd  -continue atorvastin 20 mg qhs    #GI/:  -NPO at present except meds  -protonix 40 mg IV qd  -strict I & O's  -contact Renal attending - as pt is ESRD on HD M/T/TH/Sat will need H.D. therapy  -when HHS/DKA controlled start no concentrated sweet/renal diet as tolerated    #I.D.:  -pt currently without leukocytosis or febrile  -pan culture for surveillance   -no need for antibx at present    #FEN/ENDO/HEME:  -cmp/mg++/po--4/cbc/coags/ketones/abg/osmololity now  -bmp/mg++/po--4/acetone/vbg/osmololity q 4hrs  -as pt is ESRD will hold standing IV fluid - plan for H.D.  -place on Insulin gtt - titrate to glucose 160 to 180, AG <15, HCO3 >18, ph>7.3  -POC glucose q 1 hr 63 yr old female with PMHx DM type 1 on insulin pump, frequent admissions for DKA , last hospitalized July 2018 in which she required balloon fistuloplasty of her Lt AVF 2/2 to stenosis. ESRD on H.D. M/T/TH/Sat (last H.D. this past Thursday 10/11/18 with removal of 2.5 liters), CAD s/p stents, HFrEF 52%, Mod Ao stenosis, HLD, CVA, PVD who now presents to E.D. from home after developing nausea/vomiting last evening and observing POC glucose of 600. Pt endorses NBNB vomitus q 2 hrs since 2300 last evening (10/12/18). Pt states insulin pump in place and functioning well. In E.D. found to be hyperglycemic of 663, Beta hydroxybutyrate of 2.4 (Nl </= 0.4), serum HCO3 23 (baseline 23 to 27), AG 27, sCr 6.7, K+ 5.4 ( ECG without ST-T changes) Vph 7.33. In E.D. Received 1 liter NS, started on insulin gtt, insulin pump d/c'ed as gtt started. Admitted to MICU for HHS vs DKA            Plan:  #Neuro:  -neuro checks q 2 hrs and prn for changes  -bedrest at present  -physical therapy consult when condition improved    #Pulm:  -supplemental O2 as needed to maintain SPO2 > 92%  -incentive spirometry 10x q 2 hrs  -HOB >/= 30 degree angle    #CV:  -Hx of HFrEF, CAD s/p stents  -obtain cardiac enzymes now and q 8 hrs x3  -ecg now and q am x 3 days  -with hx of HFrEF, Mod Ao stenosis - obtain TTE to eval progress of dz  -continue ASA 81 mg po qd  -continue plavix 75 mg po qd  -continue atorvastin 20 mg qhs    #GI/:  -NPO at present except meds  -protonix 40 mg IV qd  -strict I & O's  -contact Renal attending - as pt is ESRD on HD M/T/TH/Sat will need H.D. therapy  -when HHS/DKA controlled start no concentrated sweet/renal diet as tolerated    #I.D.:  -pt currently without leukocytosis or febrile  -pan culture for surveillance   -RVP   -no need for antibx at present    #FEN/ENDO/HEME:  -cmp/mg++/po--4/cbc/coags/ketones/abg/osmololity now  -bmp/mg++/po--4/acetone/vbg/osmololity q 4hrs  -as pt is ESRD will hold standing IV fluid - plan for H.D.  -place on Insulin gtt - titrate to glucose 160 to 180, AG <15, HCO3 >18, ph>7.3  -POC glucose q 1 hr 63 yr old female with PMHx DM type 1 on insulin pump, frequent admissions for DKA , last hospitalized July 2018 in which she required balloon fistuloplasty of her Lt AVF 2/2 to stenosis. ESRD on H.D. M/T/TH/Sat (last H.D. this past Thursday 10/11/18 with removal of 2.5 liters), CAD s/p stents, HFrEF 52%, Mod Ao stenosis, HLD, CVA, PVD who now presents to E.D. from home after developing nausea/vomiting last evening and observing POC glucose of 600. Pt endorses NBNB vomitus q 2 hrs since 2300 last evening (10/12/18). Pt states insulin pump in place and functioning well.  Admitted to MICU for HHS vs DKA            Plan:  #Neuro:  -neuro checks q 2 hrs and prn for changes  -bedrest at present  -physical therapy consult when condition improved    #Pulm:  -supplemental O2 as needed to maintain SPO2 > 92%  -incentive spirometry 10x q 2 hrs  -HOB >/= 30 degree angle    #CV:  -Hx of HFrEF, CAD s/p stents  -obtain cardiac enzymes now and q 8 hrs x3  -ecg now and q am x 3 days  -with hx of HFrEF, Mod Ao stenosis - obtain TTE to eval progress of dz  -continue ASA 81 mg po qd  -continue plavix 75 mg po qd  -continue atorvastin 20 mg qhs    #GI/:  -NPO at present except meds  -protonix 40 mg IV qd  -strict I & O's  -contact Renal attending - as pt is ESRD on HD M/T/TH/Sat will need H.D. therapy  -when HHS/DKA controlled start no concentrated sweet/renal diet as tolerated    #I.D.:  -pt currently without leukocytosis or febrile  -pan culture for surveillance   -RVP   -obtain UA  -no need for antibx at present    #FEN/ENDO/HEME:  -cmp/mg++/po--4/cbc/coags/ketones/abg/osmololity now  -bmp/mg++/po--4/acetone/vbg/osmololity q 4hrs  -as pt is ESRD will hold standing IV fluid - plan for H.D.  -place on Insulin gtt - titrate to glucose 160 to 180, AG <15, HCO3 >18, ph>7.3  -POC glucose q 1 hr 63 yr old female with PMHx DM type 1 on insulin pump, frequent admissions for DKA , last hospitalized July 2018 in which she required balloon fistuloplasty of her Lt AVF 2/2 to stenosis. ESRD on H.D. M/T/TH/Sat (last H.D. this past Thursday 10/11/18 with removal of 2.5 liters), CAD s/p stents, HFrEF 52%, Mod Ao stenosis, HLD, CVA, PVD who now presents to E.D. from home after developing nausea/vomiting last evening and observing POC glucose of 600. Pt endorses NBNB vomitus q 2 hrs since 2300 last evening (10/12/18). Pt states insulin pump in place and functioning well.  Admitted to MICU for HHS vs DKA            Plan:  #Neuro:  -neuro checks q 2 hrs and prn for changes  -bedrest at present  -physical therapy consult when condition improved    #Pulm:  -supplemental O2 as needed to maintain SPO2 > 92%  -incentive spirometry 10x q 2 hrs  -HOB >/= 30 degree angle    #CV:  -Hx of HFrEF, CAD s/p stents  -obtain cardiac enzymes now and q 8 hrs x3  -ecg now and q am x 3 days  -with hx of HFrEF, Mod Ao stenosis - obtain TTE to eval progress of dz  -continue ASA 81 mg po qd  -continue plavix 75 mg po qd  -continue atorvastin 20 mg qhs    #GI/:  -NPO at present except meds  -protonix 40 mg IV qd  -strict I & O's  -contact Renal attending - as pt is ESRD on HD M/T/TH/Sat will need H.D. therapy  -when HHS/DKA controlled start no concentrated sweet/renal diet as tolerated    #I.D.:  -pt currently without leukocytosis or febrile  -pan culture for surveillance   -RVP   -obtain UA  -no need for antibx at present    #FEN/ENDO/HEME:  -cmp/mg++/po--4/cbc/coags/ketones/abg/osmololity now  -bmp/mg++/po--4/acetone/vbg/osmololity q 4hrs  -as pt is ESRD will hold standing IV fluid - plan for H.D.  -place on Insulin gtt - titrate to glucose 160 to 180, AG <15, HCO3 >18, ph>7.3  -POC glucose q 1 hr  -as AG 27 will obtain lactate level 63 yr old female with PMHx DM type 1 on insulin pump, frequent admissions for DKA , last hospitalized July 2018 in which she required balloon fistuloplasty of her Lt AVF 2/2 to stenosis. ESRD on H.D. M/T/TH/Sat (last H.D. this past Thursday 10/11/18 with removal of 2.5 liters), CAD s/p stents, HFrEF 52%, Mod Ao stenosis, HLD, CVA, PVD who now presents to E.D. from home after developing nausea/vomiting last evening and observing POC glucose of 600. Pt endorses NBNB vomitus q 2 hrs since 2300 last evening (10/12/18). Pt states insulin pump in place and functioning well.  Admitted to MICU for HHS vs DKA            Plan:  #Neuro:  -neuro checks q 2 hrs and prn for changes  -bedrest at present  -physical therapy consult when condition improved    #Pulm:  -supplemental O2 as needed to maintain SPO2 > 92%  -incentive spirometry 10x q 2 hrs  -HOB >/= 30 degree angle    #CV:  -Hx of HFrEF, CAD s/p stents  -obtain cardiac enzymes now and q 8 hrs x3  -ecg now and q am x 3 days  -with hx of HFrEF, Mod Ao stenosis - obtain TTE to eval progress of dz  -continue ASA 81 mg po qd  -continue plavix 75 mg po qd  -continue atorvastin 20 mg qhs  -hold ACE-I at present (K+5.4)  -continue Metoprolol - 25mg po q 12 hrs  -continue hydralazine 25 mg po q 8 hrs    #GI/:  -NPO at present except meds  -protonix 40 mg IV qd  -strict I & O's  -contact Renal attending - as pt is ESRD on HD M/T/TH/Sat will need H.D. therapy  -when HHS/DKA controlled start no concentrated sweet/renal diet as tolerated    #I.D.:  -pt currently without leukocytosis or febrile  -pan culture for surveillance   -RVP   -obtain UA  -no need for antibx at present    #FEN/ENDO/HEME:  -cmp/mg++/po--4/cbc/coags/ketones/abg/osmololity now  -bmp/mg++/po--4/acetone/vbg/osmololity q 4hrs  -as pt is ESRD will hold standing IV fluid - plan for H.D.  -place on Insulin gtt - titrate to glucose 160 to 180, AG <15, HCO3 >18, ph>7.3  -POC glucose q 1 hr  -as AG 27 will obtain lactate level

## 2018-10-13 NOTE — H&P ADULT - ATTENDING COMMENTS
Pt seen and examined. 63 F with type 1 DM on an insulin pump, ESRD on HD, CAD s/p PCI, HFrEF, recurrent admissions due to DKA (last in 7/18), now readmitted with N/V, found to have DKA. Will cont insulin gtt, follow FS Q1H and BMP Q4H. Will require transition to insulin pump once DKA resolved. CXR clear, will check UA and lactate level. No obvious source of infection, no clear precipitating factor. Remains hemodynamically stable. Plan for HD today.   - Critical Care time Spent: 35 mins

## 2018-10-13 NOTE — CONSULT NOTE ADULT - ATTENDING COMMENTS
Complicated pt on CSII presents with DKA no obvious precipitating cause such as infection or sign of pump failure. Agree with IV insulin DKA protocol

## 2018-10-13 NOTE — CONSULT NOTE ADULT - SUBJECTIVE AND OBJECTIVE BOX
HPI:      PAST MEDICAL & SURGICAL HISTORY:  CHF (congestive heart failure): EF 40-45%  Subclavian vein stenosis, left: s/p stent  DKA, type 1: 1/2015  ACS (acute coronary syndrome): 1/2015 - cath revealed 100% ostial stenosis not amenable to PCI - medical management  TIA (transient ischemic attack): x 2 - 8-9 years ago prior to ASD/VSD repair  CAD (coronary artery disease): s/p stents  Gout: past  CVA (cerebral infarction): with no residual, 8 yrs ago, prior to heart surgery - ST memory loss  Peripheral vascular disease: occluded left fem-pop bypass 5/2015  Diabetes mellitus type 1: Insulin Dependent - Medtronic  Minimed Paradigm Insulin Pump - Novolog  ESRD (end stage renal disease): dialysis  M, tue, thursday, saturday  Hyperlipidemia  Status post device closure of ASD: &quot;clamshell&quot;  History of cardiac catheterization: 1/2015 - no intervention  S/P femoral-popliteal bypass surgery: L and R in 2013 with graft; 5/2015 CFA angioplasty left and ileofemoral endarterectomywith vein patch angioplasty of left fem-pop bypass graft  Multiple vascular surgery both leg, left fempop bypass revision 11/2015  AV (arteriovenous fistula): Left AV graft; revision with stent placement 2-3 years ago  S/P cholecystectomy      FAMILY HISTORY:  Family history of smoking  Family history of hypertension  Family history of cancer (Sibling)      Social History:    Outpatient Medications:    MEDICATIONS  (STANDING):  heparin  Injectable 5000 Unit(s) SubCutaneous every 8 hours  insulin Infusion 2 Unit(s)/Hr (2 mL/Hr) IV Continuous <Continuous>  pantoprazole  Injectable 40 milliGRAM(s) IV Push daily    MEDICATIONS  (PRN):      Allergies    No Known Allergies    Intolerances      Review of Systems:  Constitutional: No fever  Eyes: No blurry vision  Neuro: No tremors  HEENT: No pain  Cardiovascular: No chest pain, palpitations  Respiratory: No SOB, no cough  GI: No nausea, vomiting, abdominal pain  : No dysuria  Skin: no rash  Psych: no depression  Endocrine: no polyuria, polydipsia  Hem/lymph: no swelling  Osteoporosis: no fractures    ALL OTHER SYSTEMS REVIEWED AND NEGATIVE    UNABLE TO OBTAIN    PHYSICAL EXAM:  VITALS: T(C): 36.7 (10-13-18 @ 12:25)  T(F): 98 (10-13-18 @ 12:25), Max: 98.5 (10-13-18 @ 09:07)  HR: 71 (10-13-18 @ 12:25) (68 - 81)  BP: 146/69 (10-13-18 @ 12:25) (146/69 - 183/70)  RR:  (14 - 22)  SpO2:  (96% - 98%)  Wt(kg): --  GENERAL: NAD, well-groomed, well-developed  EYES: No proptosis, no lid lag, anicteric  HEENT:  Atraumatic, Normocephalic, moist mucous membranes  THYROID: Normal size, no palpable nodules  RESPIRATORY: Clear to auscultation bilaterally; No rales, rhonchi, wheezing, or rubs  CARDIOVASCULAR: Regular rate and rhythm; No murmurs; no peripheral edema  GI: Soft, nontender, non distended, normal bowel sounds  SKIN: Dry, intact, No rashes or lesions  MUSCULOSKELETAL: Full range of motion, normal strength  NEURO: sensation intact, extraocular movements intact, no tremor, normal reflexes  PSYCH: Alert and oriented x 3, normal affect, normal mood  CUSHING'S SIGNS: no striae    POCT Blood Glucose.: 378 mg/dL (10-13-18 @ 12:08)  POCT Blood Glucose.: 506 mg/dL (10-13-18 @ 11:07)  POCT Blood Glucose.: >600 mg/dL (10-13-18 @ 09:16)                            10.6   8.1   )-----------( 258      ( 13 Oct 2018 09:35 )             32.5       10-13    132<L>  |  82<L>  |  32<H>  ----------------------------<  663<HH>  5.4<H>   |  23  |  6.67<H>    EGFR if : 7<L>  EGFR if non : 6<L>    Ca    8.4      10-13    TPro  8.0  /  Alb  4.6  /  TBili  0.5  /  DBili  x   /  AST  16  /  ALT  9<L>  /  AlkPhos  142<H>  10-13      Thyroid Function Tests:              Radiology: HPI: 63 yr F with T1DM c/b ESRD on HD, CAD, TIA, PVD here with     Patient on mini mendtronic 630G. Follows with endocrinologist Dr. Ley. Last changed pump Sunday, and then also 6days prior. Patient reports following diabetic low carb diet. Eggs for breakfast, sandwich for lunch, chicken for dinner. Denies drinking soda, juices. As per pump, FS running 190s-mids 300s.  Her settings are:   Basal 12am 2.75 units/hour 5am 0.375 units/hour   Bolus I:C 12am-12am 1:15 ISF 12am 60 7am 40 6pm 60 Target BS 12am 110-130, 8am 100-120. IOB: 6 hours.      PAST MEDICAL & SURGICAL HISTORY:  CHF (congestive heart failure): EF 40-45%  Subclavian vein stenosis, left: s/p stent  DKA, type 1: 1/2015  ACS (acute coronary syndrome): 1/2015 - cath revealed 100% ostial stenosis not amenable to PCI - medical management  TIA (transient ischemic attack): x 2 - 8-9 years ago prior to ASD/VSD repair  CAD (coronary artery disease): s/p stents  Gout: past  CVA (cerebral infarction): with no residual, 8 yrs ago, prior to heart surgery - ST memory loss  Peripheral vascular disease: occluded left fem-pop bypass 5/2015  Diabetes mellitus type 1: Insulin Dependent - Medtronic  Minimed Paradigm Insulin Pump - Novolog  ESRD (end stage renal disease): dialysis  M, tue, thursday, saturday  Hyperlipidemia  Status post device closure of ASD: &quot;clamshell&quot;  History of cardiac catheterization: 1/2015 - no intervention  S/P femoral-popliteal bypass surgery: L and R in 2013 with graft; 5/2015 CFA angioplasty left and ileofemoral endarterectomywith vein patch angioplasty of left fem-pop bypass graft  Multiple vascular surgery both leg, left fempop bypass revision 11/2015  AV (arteriovenous fistula): Left AV graft; revision with stent placement 2-3 years ago  S/P cholecystectomy      FAMILY HISTORY:  Family history of smoking  Family history of hypertension  Family history of cancer (Sibling)      Social History:    Outpatient Medications:    MEDICATIONS  (STANDING):  heparin  Injectable 5000 Unit(s) SubCutaneous every 8 hours  insulin Infusion 2 Unit(s)/Hr (2 mL/Hr) IV Continuous <Continuous>  pantoprazole  Injectable 40 milliGRAM(s) IV Push daily    MEDICATIONS  (PRN):      Allergies    No Known Allergies    Intolerances      Review of Systems:  Constitutional: No fever  Eyes: No blurry vision  Neuro: No tremors  HEENT: No pain  Cardiovascular: No chest pain, palpitations  Respiratory: No SOB, no cough  GI: No nausea, vomiting, abdominal pain  : No dysuria  Skin: no rash  Psych: no depression  Endocrine: no polyuria, polydipsia  Hem/lymph: no swelling  Osteoporosis: no fractures    ALL OTHER SYSTEMS REVIEWED AND NEGATIVE    UNABLE TO OBTAIN    PHYSICAL EXAM:  VITALS: T(C): 36.7 (10-13-18 @ 12:25)  T(F): 98 (10-13-18 @ 12:25), Max: 98.5 (10-13-18 @ 09:07)  HR: 71 (10-13-18 @ 12:25) (68 - 81)  BP: 146/69 (10-13-18 @ 12:25) (146/69 - 183/70)  RR:  (14 - 22)  SpO2:  (96% - 98%)  Wt(kg): --  GENERAL: NAD, well-groomed, well-developed  EYES: No proptosis, no lid lag, anicteric  HEENT:  Atraumatic, Normocephalic, moist mucous membranes  THYROID: Normal size, no palpable nodules  RESPIRATORY: Clear to auscultation bilaterally; No rales, rhonchi, wheezing, or rubs  CARDIOVASCULAR: Regular rate and rhythm; No murmurs; no peripheral edema  GI: Soft, nontender, non distended, normal bowel sounds  SKIN: Dry, intact, No rashes or lesions  MUSCULOSKELETAL: Full range of motion, normal strength  NEURO: sensation intact, extraocular movements intact, no tremor, normal reflexes  PSYCH: Alert and oriented x 3, normal affect, normal mood  CUSHING'S SIGNS: no striae    POCT Blood Glucose.: 378 mg/dL (10-13-18 @ 12:08)  POCT Blood Glucose.: 506 mg/dL (10-13-18 @ 11:07)  POCT Blood Glucose.: >600 mg/dL (10-13-18 @ 09:16)                            10.6   8.1   )-----------( 258      ( 13 Oct 2018 09:35 )             32.5       10-13    132<L>  |  82<L>  |  32<H>  ----------------------------<  663<HH>  5.4<H>   |  23  |  6.67<H>    EGFR if : 7<L>  EGFR if non : 6<L>    Ca    8.4      10-13    TPro  8.0  /  Alb  4.6  /  TBili  0.5  /  DBili  x   /  AST  16  /  ALT  9<L>  /  AlkPhos  142<H>  10-13      Thyroid Function Tests:              Radiology: HPI: 63 yr F with T1DM c/b ESRD on HD, CAD, TIA, PVD here with   She has had multiple hospitalizations for DKA. Her most recent admsiion in July 2018.     Patient on mini mendtronic 630G. Follows with endocrinologist Dr. Ley. Last changed pump Sunday, and then also 6days prior. Patient reports following diabetic low carb diet. Eggs for breakfast, sandwich for lunch, chicken for dinner. Denies drinking soda, juices. As per pump, FS running 190s-mids 300s.  Her settings are:   Basal 12am 2.75 units/hour 5am 0.375 units/hour   Bolus I:C 12am-12am 1:15 ISF 12am 60 7am 40 6pm 60 Target BS 12am 110-130, 8am 100-120. IOB: 6 hours.      PAST MEDICAL & SURGICAL HISTORY:  CHF (congestive heart failure): EF 40-45%  Subclavian vein stenosis, left: s/p stent  DKA, type 1: 1/2015  ACS (acute coronary syndrome): 1/2015 - cath revealed 100% ostial stenosis not amenable to PCI - medical management  TIA (transient ischemic attack): x 2 - 8-9 years ago prior to ASD/VSD repair  CAD (coronary artery disease): s/p stents  Gout: past  CVA (cerebral infarction): with no residual, 8 yrs ago, prior to heart surgery - ST memory loss  Peripheral vascular disease: occluded left fem-pop bypass 5/2015  Diabetes mellitus type 1: Insulin Dependent - Medtronic  Minimed Paradigm Insulin Pump - Novolog  ESRD (end stage renal disease): dialysis  M, tue, thursday, saturday  Hyperlipidemia  Status post device closure of ASD: &quot;shantel&quot;  History of cardiac catheterization: 1/2015 - no intervention  S/P femoral-popliteal bypass surgery: L and R in 2013 with graft; 5/2015 CFA angioplasty left and ileofemoral endarterectomywith vein patch angioplasty of left fem-pop bypass graft  Multiple vascular surgery both leg, left fempop bypass revision 11/2015  AV (arteriovenous fistula): Left AV graft; revision with stent placement 2-3 years ago  S/P cholecystectomy      FAMILY HISTORY:  Family history of smoking  Family history of hypertension  Family history of cancer (Sibling)      Social History:    Outpatient Medications:    MEDICATIONS  (STANDING):  heparin  Injectable 5000 Unit(s) SubCutaneous every 8 hours  insulin Infusion 2 Unit(s)/Hr (2 mL/Hr) IV Continuous <Continuous>  pantoprazole  Injectable 40 milliGRAM(s) IV Push daily    MEDICATIONS  (PRN):      Allergies    No Known Allergies    Intolerances      Review of Systems:  Constitutional: No fever  Eyes: No blurry vision  Neuro: No tremors  HEENT: No pain  Cardiovascular: No chest pain, palpitations  Respiratory: No SOB, no cough  GI: No nausea, vomiting, abdominal pain  : No dysuria  Skin: no rash  Psych: no depression  Endocrine: no polyuria, polydipsia  Hem/lymph: no swelling  Osteoporosis: no fractures    ALL OTHER SYSTEMS REVIEWED AND NEGATIVE    UNABLE TO OBTAIN    PHYSICAL EXAM:  VITALS: T(C): 36.7 (10-13-18 @ 12:25)  T(F): 98 (10-13-18 @ 12:25), Max: 98.5 (10-13-18 @ 09:07)  HR: 71 (10-13-18 @ 12:25) (68 - 81)  BP: 146/69 (10-13-18 @ 12:25) (146/69 - 183/70)  RR:  (14 - 22)  SpO2:  (96% - 98%)  Wt(kg): --  GENERAL: NAD, well-groomed, well-developed  EYES: No proptosis, no lid lag, anicteric  HEENT:  Atraumatic, Normocephalic, moist mucous membranes  THYROID: Normal size, no palpable nodules  RESPIRATORY: Clear to auscultation bilaterally; No rales, rhonchi, wheezing, or rubs  CARDIOVASCULAR: Regular rate and rhythm; No murmurs; no peripheral edema  GI: Soft, nontender, non distended, normal bowel sounds  SKIN: Dry, intact, No rashes or lesions  MUSCULOSKELETAL: Full range of motion, normal strength  NEURO: sensation intact, extraocular movements intact, no tremor, normal reflexes  PSYCH: Alert and oriented x 3, normal affect, normal mood  CUSHING'S SIGNS: no striae    POCT Blood Glucose.: 378 mg/dL (10-13-18 @ 12:08)  POCT Blood Glucose.: 506 mg/dL (10-13-18 @ 11:07)  POCT Blood Glucose.: >600 mg/dL (10-13-18 @ 09:16)                            10.6   8.1   )-----------( 258      ( 13 Oct 2018 09:35 )             32.5       10-13    132<L>  |  82<L>  |  32<H>  ----------------------------<  663<HH>  5.4<H>   |  23  |  6.67<H>    EGFR if : 7<L>  EGFR if non : 6<L>    Ca    8.4      10-13    TPro  8.0  /  Alb  4.6  /  TBili  0.5  /  DBili  x   /  AST  16  /  ALT  9<L>  /  AlkPhos  142<H>  10-13      Thyroid Function Tests:              Radiology: HPI: 63 yr F with T1DM c/b ESRD on HD, CAD, TIA, PVD, neuropathy here with nausea/vomiting. She was brought in by  who checked her FS adn starla it to be 600s. She has had multiple hospitalizations for DKA. Her most recent admission was in July 2018. At that tiem lizbeth was in DKA due to running out of insulin in her pump. Patient on mini medtronic 630G. Follows with endocrinologist Dr. Ley. She chanegs her pump site every 2-3 days.  Patient reports following diabetic low carb diet. Eggs for breakfast, sandwich for lunch, chicken for dinner. Denies drinking soda, juices. As per lizbeth her FS had been running int eh 200s over the past few days.   Her settings are:   Basal 12am 0.275 units/hour 5am 0.375 units/hour   Bolus I:C 12am-12am 1:15 ISF 12am 60 7am 40 6pm 60 Target BS 12am 110-130, 8am 100-120. IOB: 6 hours.  She had not had any chesty pain or infectious signs/symptoms. No signs of pump malfunction were ntoed.       PAST MEDICAL & SURGICAL HISTORY:  CHF (congestive heart failure): EF 40-45%  Subclavian vein stenosis, left: s/p stent  DKA, type 1: 1/2015  ACS (acute coronary syndrome): 1/2015 - cath revealed 100% ostial stenosis not amenable to PCI - medical management  TIA (transient ischemic attack): x 2 - 8-9 years ago prior to ASD/VSD repair  CAD (coronary artery disease): s/p stents  Gout: past  CVA (cerebral infarction): with no residual, 8 yrs ago, prior to heart surgery - ST memory loss  Peripheral vascular disease: occluded left fem-pop bypass 5/2015  Diabetes mellitus type 1: Insulin Dependent - Medtronic  Minimed Paradigm Insulin Pump - Novolog  ESRD (end stage renal disease): dialysis  M, tue, thursday, saturday  Hyperlipidemia  Status post device closure of ASD: &quot;clamshell&quot;  History of cardiac catheterization: 1/2015 - no intervention  S/P femoral-popliteal bypass surgery: L and R in 2013 with graft; 5/2015 CFA angioplasty left and ileofemoral endarterectomywith vein patch angioplasty of left fem-pop bypass graft  Multiple vascular surgery both leg, left fempop bypass revision 11/2015  AV (arteriovenous fistula): Left AV graft; revision with stent placement 2-3 years ago  S/P cholecystectomy      FAMILY HISTORY:  Family history of smoking  Family history of hypertension  Family history of cancer (Sibling)      Social History: Lives with     Outpatient Medications: Humalog    MEDICATIONS  (STANDING):  heparin  Injectable 5000 Unit(s) SubCutaneous every 8 hours  insulin Infusion 2 Unit(s)/Hr (2 mL/Hr) IV Continuous <Continuous>  pantoprazole  Injectable 40 milliGRAM(s) IV Push daily    MEDICATIONS  (PRN):      Allergies    No Known Allergies    Intolerances      Review of Systems:  Constitutional: No fever  Eyes: No blurry vision  Neuro: No tremors  HEENT: No pain  Cardiovascular: No chest pain, palpitations  Respiratory: No SOB, no cough  GI: No nausea, vomiting, abdominal pain  : No dysuria  Skin: no rash  Psych: no depression  Endocrine: no polyuria, polydipsia  Hem/lymph: no swelling  Osteoporosis: no fractures    ALL OTHER SYSTEMS REVIEWED AND NEGATIVE        PHYSICAL EXAM:  VITALS: T(C): 36.7 (10-13-18 @ 12:25)  T(F): 98 (10-13-18 @ 12:25), Max: 98.5 (10-13-18 @ 09:07)  HR: 71 (10-13-18 @ 12:25) (68 - 81)  BP: 146/69 (10-13-18 @ 12:25) (146/69 - 183/70)  RR:  (14 - 22)  SpO2:  (96% - 98%)  Wt(kg): --  GENERAL: NAD, well-groomed, well-developed  EYES: No proptosis, no lid lag, anicteric  HEENT:  Atraumatic, Normocephalic, moist mucous membranes  THYROID: Normal size, no palpable nodules  RESPIRATORY: Clear to auscultation bilaterally; No rales, rhonchi, wheezing, or rubs  CARDIOVASCULAR: Regular rate and rhythm; No murmurs; no peripheral edema  GI: Soft, nontender, non distended, normal bowel sounds  SKIN: Dry, intact, No rashes or lesions  PSYCH: Alert and oriented x 3, normal affect, normal mood      POCT Blood Glucose.: 378 mg/dL (10-13-18 @ 12:08)  POCT Blood Glucose.: 506 mg/dL (10-13-18 @ 11:07)  POCT Blood Glucose.: >600 mg/dL (10-13-18 @ 09:16)                            10.6   8.1   )-----------( 258      ( 13 Oct 2018 09:35 )             32.5       10-13    132<L>  |  82<L>  |  32<H>  ----------------------------<  663<HH>  5.4<H>   |  23  |  6.67<H>    EGFR if : 7<L>  EGFR if non : 6<L>    Ca    8.4      10-13    TPro  8.0  /  Alb  4.6  /  TBili  0.5  /  DBili  x   /  AST  16  /  ALT  9<L>  /  AlkPhos  142<H>  10-13

## 2018-10-13 NOTE — CONSULT NOTE ADULT - PROBLEM SELECTOR RECOMMENDATION 2
insulin pump removed and patient placed on insulin gtt in ER  further management per MICU  mild hyperkalemia in setting of DKA to be corrected with HD today  HD should also assist with stabilization of glucose
Continue atorvastatin

## 2018-10-13 NOTE — H&P ADULT - NEGATIVE GASTROINTESTINAL SYMPTOMS
no constipation/no steatorrhea/no flatulence/no abdominal pain/no change in bowel habits/no hematochezia/no hiccoughs/no diarrhea/no melena/no jaundice

## 2018-10-13 NOTE — ED PROVIDER NOTE - MEDICAL DECISION MAKING DETAILS
64yo F pmhx CAD, PVD, DM, ESRD, CHF, HTN p/w CC n/v and elevated blood glucose. Well appearing at this time. Glucometer on arrival reads "high", will start with fluids but reassess frequently in setting of missed dialysis and CHF. Obtain EKG, CXR. Send labs to assess for DKA. Likely will require insulin drip.

## 2018-10-13 NOTE — ED ADULT NURSE NOTE - OBJECTIVE STATEMENT
64 yo female with PMH IDDM on insulin pump, ESRD on HD T/Th/S, CHF presents to ED with elevated glucose levels on home reading, nausea, and one episode of vomiting this morning. 64 yo female with PMH IDDM on insulin pump, ESRD on HD T/Th/S, CAD s/p multiple PCI, CHF presents to ED with elevated glucose levels on home readings since last night, nausea, and one episode of vomiting this morning. Per patient's , patient "eats everything" and is noncompliant with diet.  In ED, patient initial finger stick >600. Patient transferred to critical/green area.  at bedside.

## 2018-10-13 NOTE — H&P ADULT - HISTORY OF PRESENT ILLNESS
63 yr old female with PMHx DM type 1 on insulin pump, frequent admissions for DKA , last hospitalized July 2018 in which she required balloon fistuloplasty of her Lt AVF 2/2 to stenosis. ESRD on H.D. M/T/TH/Sat (last H.D. this past Thursday 10/11/18 with removal of 2.5 liters), CAD s/p stents, HFrEF 52%, Mod Ao stenosis, HLD, CVA, PVD who now presents to E.D. from home after developing nausea/vomiting last evening and observing POC glucose of 600. Pt endorses NBNB vomitus q 2 hrs since 2300 last evening (10/12/18). Pt states insulin pump in place and functioning well. In E.D. found to be hyperglycemic of 663, Beta hydroxybutyrate of 2.4 (Nl </= 0.4), serum HCO3 23 (baseline 23 to 27), AG 27, sCr 6.7, K+ 5.4 ( ECG without ST-T changes) Vph 7.33. In E.D. Received 1 liter NS, started on insulin gtt, insulin pump d/c'ed as gtt started. Admitted to MICU for HHS vs DKA 63 yr old female with PMHx DM type 1 on insulin pump, frequent admissions for DKA , last hospitalized July 2018 in which she required balloon fistuloplasty of her Lt AVF 2/2 to stenosis. ESRD on H.D. M/T/TH/Sat (last H.D. this past Thursday 10/11/18 with removal of 2.5 liters), CAD s/p stents, HFrEF 52%, Mod Ao stenosis, HLD, CVA, PVD who now presents to E.D. from home after developing nausea/vomiting last evening and observing POC glucose of 600 (glucose range 200 to 250). Pt endorses NBNB vomitus q 2 hrs since 2300 last evening (10/12/18). Pt states insulin pump in place and functioning well. In E.D. found to be hyperglycemic of 663, Beta hydroxybutyrate of 2.4 (Nl </= 0.4), serum HCO3 23 (baseline 23 to 27), AG 27, sCr 6.7, K+ 5.4 ( ECG without ST-T changes) Vph 7.33, BNP 00637. In E.D. Received 1 liter NS, started on insulin gtt, insulin pump d/c'ed as gtt started. Admitted to MICU for HHS vs DKA      denies diarrhea, fever, chills, cough, sob, c/p

## 2018-10-13 NOTE — ED PROVIDER NOTE - PROGRESS NOTE DETAILS
Patient noted to be in DKA. Ordered insulin drip and will turn off pump when starting drip. [id number]  MICU consult.

## 2018-10-13 NOTE — CONSULT NOTE ADULT - SUBJECTIVE AND OBJECTIVE BOX
[facility]  [phone]  Dr. Urban (covering for Dr. Burger)  NEPHROLOGY  INITIAL CONSULT NOTE  --------------------------------------------------------------------------------  HPI: 62 y/o female with a h/o ESRD on HD 4x/weekly, last HD two days ago, CAD w/ 8 stents, PVD, DM on insulin, CHF, HTN who began having intractable N/V this AM en route to HD.   brought patient to ED for evaluation and management. Pt. explains that last night her finger sticks were elevated, this morning she began to experience nausea and vomiting and her finger stick reading was >600.  Patient is well known to my associate Dr. Burger and is due for HD today.    Of note, patient's  reports recent fistulogram/angioplasty with stent placement.  Patient denies fevers/rigors.    PAST HISTORY  --------------------------------------------------------------------------------  PAST MEDICAL & SURGICAL HISTORY:  CHF (congestive heart failure): EF 40-45%  Subclavian vein stenosis, left: s/p stent  DKA, type 1: 1/2015  ACS (acute coronary syndrome): 1/2015 - cath revealed 100% ostial stenosis not amenable to PCI - medical management  TIA (transient ischemic attack): x 2 - 8-9 years ago prior to ASD/VSD repair  CAD (coronary artery disease): s/p stents  Gout: past  CVA (cerebral infarction): with no residual, 8 yrs ago, prior to heart surgery - ST memory loss  Peripheral vascular disease: occluded left fem-pop bypass 5/2015  Diabetes mellitus type 1: Insulin Dependent - [employer]  Minimed Paradigm Insulin Pump - Novolog  ESRD (end stage renal disease): dialysis  M, tue, thursday, saturday  Hyperlipidemia  Status post device closure of ASD: &quot;shantel&quot;  History of cardiac catheterization: 1/2015 - no intervention  S/P femoral-popliteal bypass surgery: L and R in 2013 with graft; 5/2015 CFA angioplasty left and ileofemoral endarterectomywith vein patch angioplasty of left fem-pop bypass graft  Multiple vascular surgery both leg, left fempop bypass revision 11/2015  AV (arteriovenous fistula): Left AV graft; revision with stent placement 2-3 years ago  S/P cholecystectomy    FAMILY HISTORY:  Family history of smoking  Family history of hypertension  Family history of cancer (Sibling)    PAST SOCIAL HISTORY:  denies tobacco/EtOH/illicit drug use    ALLERGIES & MEDICATIONS  --------------------------------------------------------------------------------  Allergies    No Known Allergies    Intolerances      Standing Inpatient Medications  insulin Infusion 5.3 Unit(s)/Hr IV Continuous <Continuous>    PRN Inpatient Medications      REVIEW OF SYSTEMS  --------------------------------------------------------------------------------  General: +fatigue/somnolence/fevers/rigors  CVS: denies CP/palpitations  Respiratory:  denies SOB/cough  GI: +N/V/abdominal pain  Neuro:  denies vertigo/diplopia. +lightheadedness    All other systems were reviewed and are negative, except as noted.    VITALS/PHYSICAL EXAM  --------------------------------------------------------------------------------  T(C): 36.8 (10-13-18 @ 11:12), Max: 36.9 (10-13-18 @ 09:07)  HR: 73 (10-13-18 @ 11:12) (73 - 81)  BP: 146/73 (10-13-18 @ 11:12) (146/73 - 183/70)  RR: 16 (10-13-18 @ 11:12) (16 - 18)  SpO2: 96% (10-13-18 @ 11:12) (96% - 98%)  Wt(kg): --  Height (cm): 160.02 (10-13-18 @ 09:07)  Weight (kg): 53.5 (10-13-18 @ 09:07)  BMI (kg/m2): 20.9 (10-13-18 @ 09:07)  BSA (m2): 1.55 (10-13-18 @ 09:07)      Physical Exam:  	Gen: Uncomfortable and frail-appearing  	Pulm: CTA B/L no w/r/r b/l anterior/lateral fields  	CV: RRR, S1S2  	Abd: +BS, soft, nontender/nondistended  	: No suprapubic tenderness, no irene  	Extremity: b/l LE pedal edema  	Neuro: somnolent/lethargic, but A&Ox3 when awoken  	Vascular access:  ANA MCGUIREF + lon    LABS/STUDIES  --------------------------------------------------------------------------------              10.6   8.1   >-----------<  258      [10-13-18 @ 09:35]              32.5     132  |  82  |  32  ----------------------------<  663      [10-13-18 @ 09:35]  5.4   |  23  |  6.67        Ca     8.4     [10-13-18 @ 09:35]    TPro  8.0  /  Alb  4.6  /  TBili  0.5  /  DBili  x   /  AST  16  /  ALT  9   /  AlkPhos  142  [10-13-18 @ 09:35]          eGFR if : 7 mL/min/1.73M2 (10-13 @ 09:35)    eGFR if Non African American: 6 mL/min/1.73M2 (10-13 @ 09:35)    Creatinine Trend:  SCr 6.67 [10-13 @ 09:35]    Urinalysis - [06-04-17 @ 08:24]      Color Yellow / Appearance Clear / SG 1.013 / pH 8.5      Gluc 1000 / Ketone Negative  / Bili Negative / Urobili Negative       Blood Negative / Protein >600 / Leuk Est Small / Nitrite Negative      RBC 3-5 / WBC 6-10 / Hyaline  / Gran  / Sq Epi  / Non Sq Epi OCC / Bacteria       PTH -- (Ca 8.3)      [03-03-18 @ 08:53]   134  HbA1c 7.2      [07-03-18 @ 05:01]  TSH 1.07      [12-19-17 @ 06:12]  Lipid: chol 113, TG 86, HDL 59, LDL 37      [04-30-18 @ 06:44]    HBsAb <3.0      [07-03-18 @ 09:45]  HBsAb Nonreact      [05-02-15 @ 15:35]  HBsAg Nonreact      [07-03-18 @ 09:45]  HBcAb Nonreact      [07-03-18 @ 09:45]  HCV 0.16, Nonreact      [07-03-18 @ 09:45]

## 2018-10-13 NOTE — ED PROVIDER NOTE - ATTENDING CONTRIBUTION TO CARE
****ATTENDING**** 62yo female hx CAD< DM on insulin pump, ESRD on dialysis last dialysis on Thursday BIB  for nausea and vomiting every 2 hours since last night. States she has history of elevated blood glucose and DKA. Insulin pump is working, + eating extra and may be related to elevated sugar. Denies chest and abd pain. No recent illness or fever.  Pt has insulin pump on at this time.  On exam, Patient is awake,alert,oriented x 3. Patient is well appearing and in no acute distress. MM Dry, Patient's chest is clear to ausculation, +s1s2. Abdomen is soft nd/nt +BS. LLE + chronically swollen.   Check labs, to eval for DKA. Eval for infection, ACS. Gentle fluids and renal consult for dialysis due today.

## 2018-10-13 NOTE — CONSULT NOTE ADULT - PROBLEM SELECTOR RECOMMENDATION 9
For HD in MICU today  Consent obtained-- signed by  due to patient preference in setting of somnolence-- and placed in chart  Orders placed in [place] and HD unit notified  dose meds for eGFR<15
-Rec IV fluids and NPO status  -Would continue patient on insulin gtt per DKA protocol  -Goal glucose 100-180  -Will transition patient back to insulin pump tomorrow if glucose levels improved  - has supplies.  -Patient always has a mild AG which may be due to ESRD.

## 2018-10-13 NOTE — ED PROVIDER NOTE - OBJECTIVE STATEMENT
64yo F pmhx CAD w/ 8 stents, PVD, DM on insulin, ESRD on dialysis 4 days a week MTRS, CHF, HTN p/w CC elevated blood glucose. Pt. explains that last night her finger sticks were elevated, this morning she began to experience nausea and vomiting and her finger stick reading was >600. Pt. states she does not feel nauseous or have any pain at this time. States she is supposed to be at dialysis right now but missed appointment to come to the ED. Last complete dialysis was Thursday (2 days ago). Denies recent illnesses, fever chills cp sob diarrhea rash numbness/tingling. States her insulin pump seems to be functioning properly.

## 2018-10-14 DIAGNOSIS — Z46.81 ENCOUNTER FOR FITTING AND ADJUSTMENT OF INSULIN PUMP: ICD-10-CM

## 2018-10-14 LAB
ANION GAP SERPL CALC-SCNC: 13 MMOL/L — SIGNIFICANT CHANGE UP (ref 5–17)
ANION GAP SERPL CALC-SCNC: 14 MMOL/L — SIGNIFICANT CHANGE UP (ref 5–17)
ANION GAP SERPL CALC-SCNC: 14 MMOL/L — SIGNIFICANT CHANGE UP (ref 5–17)
APTT BLD: 27.2 SEC — LOW (ref 27.5–37.4)
B-OH-BUTYR SERPL-SCNC: 0.3 MMOL/L — SIGNIFICANT CHANGE UP
B-OH-BUTYR SERPL-SCNC: 0.5 MMOL/L — HIGH
B-OH-BUTYR SERPL-SCNC: 2.5 MMOL/L — HIGH
BASE EXCESS BLDV CALC-SCNC: 5.3 MMOL/L — HIGH (ref -2–2)
BUN SERPL-MCNC: 11 MG/DL — SIGNIFICANT CHANGE UP (ref 7–23)
BUN SERPL-MCNC: 12 MG/DL — SIGNIFICANT CHANGE UP (ref 7–23)
BUN SERPL-MCNC: 9 MG/DL — SIGNIFICANT CHANGE UP (ref 7–23)
CA-I SERPL-SCNC: 0.99 MMOL/L — LOW (ref 1.12–1.3)
CALCIUM SERPL-MCNC: 7.8 MG/DL — LOW (ref 8.4–10.5)
CALCIUM SERPL-MCNC: 7.9 MG/DL — LOW (ref 8.4–10.5)
CALCIUM SERPL-MCNC: 8.2 MG/DL — LOW (ref 8.4–10.5)
CHLORIDE BLDV-SCNC: 97 MMOL/L — SIGNIFICANT CHANGE UP (ref 96–108)
CHLORIDE SERPL-SCNC: 95 MMOL/L — LOW (ref 96–108)
CHLORIDE SERPL-SCNC: 96 MMOL/L — SIGNIFICANT CHANGE UP (ref 96–108)
CHLORIDE SERPL-SCNC: 97 MMOL/L — SIGNIFICANT CHANGE UP (ref 96–108)
CO2 BLDV-SCNC: 31 MMOL/L — HIGH (ref 22–30)
CO2 SERPL-SCNC: 24 MMOL/L — SIGNIFICANT CHANGE UP (ref 22–31)
CO2 SERPL-SCNC: 25 MMOL/L — SIGNIFICANT CHANGE UP (ref 22–31)
CO2 SERPL-SCNC: 26 MMOL/L — SIGNIFICANT CHANGE UP (ref 22–31)
CREAT SERPL-MCNC: 2.93 MG/DL — HIGH (ref 0.5–1.3)
CREAT SERPL-MCNC: 3.46 MG/DL — HIGH (ref 0.5–1.3)
CREAT SERPL-MCNC: 3.95 MG/DL — HIGH (ref 0.5–1.3)
GAS PNL BLDV: 131 MMOL/L — LOW (ref 136–145)
GAS PNL BLDV: SIGNIFICANT CHANGE UP
GAS PNL BLDV: SIGNIFICANT CHANGE UP
GLUCOSE BLDC GLUCOMTR-MCNC: 101 MG/DL — HIGH (ref 70–99)
GLUCOSE BLDC GLUCOMTR-MCNC: 109 MG/DL — HIGH (ref 70–99)
GLUCOSE BLDC GLUCOMTR-MCNC: 110 MG/DL — HIGH (ref 70–99)
GLUCOSE BLDC GLUCOMTR-MCNC: 123 MG/DL — HIGH (ref 70–99)
GLUCOSE BLDC GLUCOMTR-MCNC: 124 MG/DL — HIGH (ref 70–99)
GLUCOSE BLDC GLUCOMTR-MCNC: 129 MG/DL — HIGH (ref 70–99)
GLUCOSE BLDC GLUCOMTR-MCNC: 141 MG/DL — HIGH (ref 70–99)
GLUCOSE BLDC GLUCOMTR-MCNC: 154 MG/DL — HIGH (ref 70–99)
GLUCOSE BLDC GLUCOMTR-MCNC: 170 MG/DL — HIGH (ref 70–99)
GLUCOSE BLDC GLUCOMTR-MCNC: 174 MG/DL — HIGH (ref 70–99)
GLUCOSE BLDC GLUCOMTR-MCNC: 180 MG/DL — HIGH (ref 70–99)
GLUCOSE BLDC GLUCOMTR-MCNC: 228 MG/DL — HIGH (ref 70–99)
GLUCOSE BLDC GLUCOMTR-MCNC: 230 MG/DL — HIGH (ref 70–99)
GLUCOSE BLDC GLUCOMTR-MCNC: 235 MG/DL — HIGH (ref 70–99)
GLUCOSE BLDC GLUCOMTR-MCNC: 312 MG/DL — HIGH (ref 70–99)
GLUCOSE BLDC GLUCOMTR-MCNC: 318 MG/DL — HIGH (ref 70–99)
GLUCOSE BLDV-MCNC: 219 MG/DL — HIGH (ref 70–99)
GLUCOSE SERPL-MCNC: 159 MG/DL — HIGH (ref 70–99)
GLUCOSE SERPL-MCNC: 169 MG/DL — HIGH (ref 70–99)
GLUCOSE SERPL-MCNC: 223 MG/DL — HIGH (ref 70–99)
HCO3 BLDV-SCNC: 30 MMOL/L — HIGH (ref 21–29)
HCT VFR BLD CALC: 27.4 % — LOW (ref 34.5–45)
HCT VFR BLDA CALC: 27 % — LOW (ref 39–50)
HGB BLD CALC-MCNC: 8.7 G/DL — LOW (ref 11.5–15.5)
HGB BLD-MCNC: 8.9 G/DL — LOW (ref 11.5–15.5)
HOROWITZ INDEX BLDV+IHG-RTO: 21 — SIGNIFICANT CHANGE UP
INR BLD: 1.1 RATIO — SIGNIFICANT CHANGE UP (ref 0.88–1.16)
LACTATE BLDV-MCNC: 0.8 MMOL/L — SIGNIFICANT CHANGE UP (ref 0.7–2)
MAGNESIUM SERPL-MCNC: 2 MG/DL — SIGNIFICANT CHANGE UP (ref 1.6–2.6)
MAGNESIUM SERPL-MCNC: 2 MG/DL — SIGNIFICANT CHANGE UP (ref 1.6–2.6)
MAGNESIUM SERPL-MCNC: 2.1 MG/DL — SIGNIFICANT CHANGE UP (ref 1.6–2.6)
MCHC RBC-ENTMCNC: 32.5 PG — SIGNIFICANT CHANGE UP (ref 27–34)
MCHC RBC-ENTMCNC: 32.6 GM/DL — SIGNIFICANT CHANGE UP (ref 32–36)
MCV RBC AUTO: 99.7 FL — SIGNIFICANT CHANGE UP (ref 80–100)
OSMOLALITY SERPL: 280 MOS/KG — SIGNIFICANT CHANGE UP (ref 275–300)
OSMOLALITY SERPL: 289 MOS/KG — SIGNIFICANT CHANGE UP (ref 275–300)
OSMOLALITY SERPL: 290 MOS/KG — SIGNIFICANT CHANGE UP (ref 275–300)
OTHER CELLS CSF MANUAL: 10 ML/DL — LOW (ref 18–22)
PCO2 BLDV: 47 MMHG — SIGNIFICANT CHANGE UP (ref 35–50)
PH BLDV: 7.42 — SIGNIFICANT CHANGE UP (ref 7.35–7.45)
PHOSPHATE SERPL-MCNC: 3.4 MG/DL — SIGNIFICANT CHANGE UP (ref 2.5–4.5)
PHOSPHATE SERPL-MCNC: 4.1 MG/DL — SIGNIFICANT CHANGE UP (ref 2.5–4.5)
PHOSPHATE SERPL-MCNC: 4.4 MG/DL — SIGNIFICANT CHANGE UP (ref 2.5–4.5)
PLATELET # BLD AUTO: 200 K/UL — SIGNIFICANT CHANGE UP (ref 150–400)
PO2 BLDV: 45 MMHG — SIGNIFICANT CHANGE UP (ref 25–45)
POTASSIUM BLDV-SCNC: 5.5 MMOL/L — HIGH (ref 3.5–5.3)
POTASSIUM SERPL-MCNC: 4.5 MMOL/L — SIGNIFICANT CHANGE UP (ref 3.5–5.3)
POTASSIUM SERPL-MCNC: 5.1 MMOL/L — SIGNIFICANT CHANGE UP (ref 3.5–5.3)
POTASSIUM SERPL-MCNC: 5.8 MMOL/L — HIGH (ref 3.5–5.3)
POTASSIUM SERPL-SCNC: 4.5 MMOL/L — SIGNIFICANT CHANGE UP (ref 3.5–5.3)
POTASSIUM SERPL-SCNC: 5.1 MMOL/L — SIGNIFICANT CHANGE UP (ref 3.5–5.3)
POTASSIUM SERPL-SCNC: 5.8 MMOL/L — HIGH (ref 3.5–5.3)
PROTHROM AB SERPL-ACNC: 11.9 SEC — SIGNIFICANT CHANGE UP (ref 9.8–12.7)
RBC # BLD: 2.75 M/UL — LOW (ref 3.8–5.2)
RBC # FLD: 14.2 % — SIGNIFICANT CHANGE UP (ref 10.3–14.5)
SAO2 % BLDV: 80 % — SIGNIFICANT CHANGE UP (ref 67–88)
SODIUM SERPL-SCNC: 134 MMOL/L — LOW (ref 135–145)
SODIUM SERPL-SCNC: 134 MMOL/L — LOW (ref 135–145)
SODIUM SERPL-SCNC: 136 MMOL/L — SIGNIFICANT CHANGE UP (ref 135–145)
WBC # BLD: 5.2 K/UL — SIGNIFICANT CHANGE UP (ref 3.8–10.5)
WBC # FLD AUTO: 5.2 K/UL — SIGNIFICANT CHANGE UP (ref 3.8–10.5)

## 2018-10-14 PROCEDURE — 93306 TTE W/DOPPLER COMPLETE: CPT | Mod: 26

## 2018-10-14 PROCEDURE — 99291 CRITICAL CARE FIRST HOUR: CPT

## 2018-10-14 RX ORDER — DEXTROSE 10 % IN WATER 10 %
1000 INTRAVENOUS SOLUTION INTRAVENOUS
Qty: 0 | Refills: 0 | Status: DISCONTINUED | OUTPATIENT
Start: 2018-10-14 | End: 2018-10-14

## 2018-10-14 RX ORDER — HYDRALAZINE HCL 50 MG
50 TABLET ORAL EVERY 8 HOURS
Qty: 0 | Refills: 0 | Status: DISCONTINUED | OUTPATIENT
Start: 2018-10-14 | End: 2018-10-17

## 2018-10-14 RX ORDER — INSULIN LISPRO 100/ML
1 VIAL (ML) SUBCUTANEOUS
Qty: 0 | Refills: 0 | Status: DISCONTINUED | OUTPATIENT
Start: 2018-10-14 | End: 2018-10-15

## 2018-10-14 RX ORDER — HYDRALAZINE HCL 50 MG
50 TABLET ORAL EVERY 12 HOURS
Qty: 0 | Refills: 0 | Status: DISCONTINUED | OUTPATIENT
Start: 2018-10-14 | End: 2018-10-14

## 2018-10-14 RX ORDER — CALCIUM GLUCONATE 100 MG/ML
2 VIAL (ML) INTRAVENOUS ONCE
Qty: 0 | Refills: 0 | Status: COMPLETED | OUTPATIENT
Start: 2018-10-14 | End: 2018-10-14

## 2018-10-14 RX ORDER — OXYCODONE AND ACETAMINOPHEN 5; 325 MG/1; MG/1
1 TABLET ORAL ONCE
Qty: 0 | Refills: 0 | Status: DISCONTINUED | OUTPATIENT
Start: 2018-10-14 | End: 2018-10-14

## 2018-10-14 RX ADMIN — CLOPIDOGREL BISULFATE 75 MILLIGRAM(S): 75 TABLET, FILM COATED ORAL at 15:41

## 2018-10-14 RX ADMIN — INSULIN HUMAN 0.5 UNIT(S)/HR: 100 INJECTION, SOLUTION SUBCUTANEOUS at 05:11

## 2018-10-14 RX ADMIN — ATORVASTATIN CALCIUM 20 MILLIGRAM(S): 80 TABLET, FILM COATED ORAL at 21:14

## 2018-10-14 RX ADMIN — Medication 50 MILLIGRAM(S): at 21:14

## 2018-10-14 RX ADMIN — Medication 10 MILLILITER(S): at 05:11

## 2018-10-14 RX ADMIN — Medication 200 GRAM(S): at 02:59

## 2018-10-14 RX ADMIN — Medication 50 MILLIGRAM(S): at 17:36

## 2018-10-14 RX ADMIN — Medication 81 MILLIGRAM(S): at 15:41

## 2018-10-14 RX ADMIN — Medication 25 MILLIGRAM(S): at 02:05

## 2018-10-14 RX ADMIN — Medication 25 MILLIGRAM(S): at 11:07

## 2018-10-14 RX ADMIN — OXYCODONE AND ACETAMINOPHEN 1 TABLET(S): 5; 325 TABLET ORAL at 23:32

## 2018-10-14 NOTE — PROGRESS NOTE ADULT - PROBLEM SELECTOR PLAN 1
s/p HD yesterday  patient is 4x / weekly dialysis as outpatient  next HD likely tomorrow  dose meds for eGFR <15

## 2018-10-14 NOTE — PROGRESS NOTE ADULT - PROBLEM SELECTOR PLAN 2
Patient had multiple acid-base disorders on admission--AG metabolic acidosis 2/2 DKA and metabolic alkalosis due to emesis +/- respiratory alkalosis (cannot assess respiratory disorder on VBG)    insulin gtt per Endocrine, MICU care

## 2018-10-14 NOTE — PROGRESS NOTE ADULT - PROBLEM SELECTOR PLAN 2
Asked team to have patient complete insulin forms and have nurses launch flowsheets to record boluses  She will be due for site change on 10/17

## 2018-10-14 NOTE — CHART NOTE - NSCHARTNOTEFT_GEN_A_CORE
MICU Transfer Note    Transfer from: MICU  Transfer to:  (x) Medicine    (  ) Telemetry    (  ) RCU    (  ) Palliative    (  ) Stroke Unit    (  ) _______________  Accepting Physician: Dr Pierce Viera (pager [phone])      MICU COURSE:  63 yr old female with PMHx DM type 1 on insulin pump, frequent admissions for DKA , last hospitalized July 2018 in which she required balloon fistuloplasty of her Lt AVF 2/2 to stenosis. ESRD on H.D. M/T/TH/Sat (last H.D. this past Thursday 10/11/18 with removal of 2.5 liters), CAD s/p stents, HFrEF 52%, Mod Ao stenosis, HLD, CVA, PVD who now presents to E.D. from home after developing NBNB nausea/vomiting q2hr x 1 day and observing POC glucose of 600 (glucose range 200 to 250). Pt states insulin pump in place and functioning well.   In E.D. found to be hyperglycemic of 663, Beta hydroxybutyrate of 2.4 (Nl </= 0.4), serum HCO3 23 (baseline 23 to 27), AG 27, sCr 6.7, K+ 5.4 ( ECG without ST-T changes) Vph 7.33, BNP 42978. In E.D. Received 1 liter NS, started on insulin gtt, insulin pump d/c'ed as gtt started. Admitted to MICU for HHS vs DKA.  In MICU, pt placed on Insulin gtt, titrated to glucose 160 to 180, AG <15, HCO3 >18, ph>7.3. POC glucose q 1 hr. Endo following. Per Endo, pump was inspected and no evidence of malfunction was found. The following day, the pt's gap had closed and her fingersticks were wnl so D10 @30-40/hr was started along with the insulin drip. Transitioned pt from insulin gtt and D10 to home insulin pump (on home regimen) and regular PO diet. Pt tolerated transition well.   Pt currently clinically improved, asymptomatic, and doing well on home insulin pump.          Vital Signs Last 24 Hrs  T(C): 36.7 (14 Oct 2018 15:00), Max: 36.9 (13 Oct 2018 19:15)  T(F): 98.1 (14 Oct 2018 15:00), Max: 98.5 (13 Oct 2018 19:15)  HR: 83 (14 Oct 2018 17:00) (68 - 91)  BP: 175/83 (14 Oct 2018 17:00) (107/59 - 180/85)  BP(mean): 119 (14 Oct 2018 17:00) (79 - 122)  RR: 20 (14 Oct 2018 17:00) (11 - 32)  SpO2: 98% (14 Oct 2018 17:00) (91% - 99%)  I&O's Summary    13 Oct 2018 07:01  -  14 Oct 2018 07:00  --------------------------------------------------------  IN: 994.5 mL / OUT: 0 mL / NET: 994.5 mL    14 Oct 2018 07:01  -  14 Oct 2018 17:57  --------------------------------------------------------  IN: 723.5 mL / OUT: 0 mL / NET: 723.5 mL          MEDICATIONS  (STANDING):  aspirin  chewable 81 milliGRAM(s) Oral daily  atorvastatin 20 milliGRAM(s) Oral at bedtime  clopidogrel Tablet 75 milliGRAM(s) Oral daily  heparin  Injectable 5000 Unit(s) SubCutaneous every 8 hours  hydrALAZINE 50 milliGRAM(s) Oral every 8 hours    MEDICATIONS  (PRN):  oxyCODONE    5 mG/acetaminophen 325 mG 1 Tablet(s) Oral once PRN Foot pain 7-10        LABS                                            8.9                   Neurophils% (auto):   x      (10-14 @ 00:19):    5.2  )-----------(200          Lymphocytes% (auto):  x                                             27.4                   Eosinphils% (auto):   x        Manual%: Neutrophils x    ; Lymphocytes x    ; Eosinophils x    ; Bands%: x    ; Blasts x                                      136    |  97     |  12                  Calcium: 7.9   / iCa: x      (10-14 @ 11:06)    ----------------------------<  159       Magnesium: 2.1                              5.1     |  26     |  3.95             Phosphorous: 4.4        ( 10-14 @ 00:19 )   PT: 11.9 sec;   INR: 1.10 ratio  aPTT: 27.2 sec          ASSESSMENT & PLAN:   63 yr old female with PMHx DM type 1 on insulin pump, frequent admissions for DKA , last hospitalized July 2018 in which she required balloon fistuloplasty of her Lt AVF 2/2 to stenosis. ESRD on H.D. M/T/TH/Sat (last H.D. this past Thursday 10/11/18 with removal of 2.5 liters), CAD s/p stents, HFrEF 52%, Mod Ao stenosis, HLD, CVA, PVD who now presents to E.D. from home after developing nausea/vomiting last and observing POC glucose of 600.  Admitted to MICU for HHS vs DKA. Pt clinically improved, on insulin pump, gap closed, FS wnl, asymptomatic, on regular PO diet.      Plan:  #Neuro:  AAOx3.   -no acute issues  -physical therapy consult when condition improved    #Pulm:  -supplemental O2 as needed to maintain SPO2 > 92%  -currently breathing comfortably and satting 100 on room air  -incentive spirometry 10x q 2 hrs    #CV:  -Hx of HFrEF, CAD s/p stents  -troponins negative  -ecg unchanged from prior  -with hx of HFrEF, Mod Ao stenosis - obtain TTE to eval progress of dz  -continue ASA 81 mg po qd  -continue plavix 75 mg po qd  -continue atorvastin 20 mg qhs  -hold ACE-I at present (K+5.4)  -continue Metoprolol - 25mg po q 12 hrs  -continue hydralazine 50 mg po q 8 hrs. (per pt, home dose is 100mg TID. continue to monitor pt's bp for tonight before increasing dose further.)    #GI/:  -on renal diet  -pt is ESRD on HD; M/T/TH/Sat will need H.D. therapy  -avoid nephrotoxic medications  -renally dose meds    #I.D.:  -pt currently without leukocytosis or febrile  -RVP negative  -no need for antibx at present    #FEN/ENDO/HEME:  -continue to monitor electrolytes, including mag and phos  -as pt is ESRD will hold standing IV fluid - plan for H.D. T/Th/Sa  -on Insulin pump at home dose  -insulin pump form/attestation completed and signed - in chart  -insulin pump flowsheet with pt's nurse  -POC glucose q 4 hr        For Follow-Up:  - Endo following for insulin pump. Per Endo, she will be due for site change on 10/17.   - Will need HD tomorrow. Renal already following

## 2018-10-14 NOTE — PROGRESS NOTE ADULT - ASSESSMENT
ESRD on HD 4x/weekly, last HD two days ago, CAD w/ 8 stents, PVD, DM on insulin pump, CHF, HTN who presents with DKA.

## 2018-10-14 NOTE — PROGRESS NOTE ADULT - ASSESSMENT
63 yr old female with PMHx DM type 1 on insulin pump, frequent admissions for DKA , last hospitalized July 2018 in which she required balloon fistuloplasty of her Lt AVF 2/2 to stenosis. ESRD on H.D. M/T/TH/Sat (last H.D. this past Thursday 10/11/18 with removal of 2.5 liters), CAD s/p stents, HFrEF 52%, Mod Ao stenosis, HLD, CVA, PVD who now presents to E.D. from home after developing nausea/vomiting last and observing POC glucose of 600.  Admitted to MICU for HHS vs DKA. Pt clinically improved on insulin gtt, gap closed, FS wnl, asymptomatic.      Plan:  #Neuro:  AAOx3.   -no acute issues  -physical therapy consult when condition improved    #Pulm:  -supplemental O2 as needed to maintain SPO2 > 92%  -currently breathing comfortably and satting 100 on room air  -incentive spirometry 10x q 2 hrs  -HOB >/= 30 degree angle    #CV:  -Hx of HFrEF, CAD s/p stents  -obtain cardiac enzymes now and q 8 hrs x3  -ecg now and q am x 3 days  -with hx of HFrEF, Mod Ao stenosis - obtain TTE to eval progress of dz  -continue ASA 81 mg po qd  -continue plavix 75 mg po qd  -continue atorvastin 20 mg qhs  -hold ACE-I at present (K+5.4)  -continue Metoprolol - 25mg po q 12 hrs  -continue hydralazine 25 mg po q 8 hrs    #GI/:  -NPO at present except meds  -protonix 40 mg IV qd  -strict I & O's  -contact Renal attending - as pt is ESRD on HD M/T/TH/Sat will need H.D. therapy  -when HHS/DKA controlled start no concentrated sweet/renal diet as tolerated    #I.D.:  -pt currently without leukocytosis or febrile  -pan culture for surveillance   -RVP   -obtain UA  -no need for antibx at present    #FEN/ENDO/HEME:  -cmp/mg++/po--4/cbc/coags/ketones/abg/osmololity now  -bmp/mg++/po--4/acetone/vbg/osmololity q 4hrs  -as pt is ESRD will hold standing IV fluid - plan for H.D.  -place on Insulin gtt - titrate to glucose 160 to 180, AG <15, HCO3 >18, ph>7.3  -POC glucose q 1 hr  -as AG 27 will obtain lactate level 63 yr old female with PMHx DM type 1 on insulin pump, frequent admissions for DKA , last hospitalized July 2018 in which she required balloon fistuloplasty of her Lt AVF 2/2 to stenosis. ESRD on H.D. M/T/TH/Sat (last H.D. this past Thursday 10/11/18 with removal of 2.5 liters), CAD s/p stents, HFrEF 52%, Mod Ao stenosis, HLD, CVA, PVD who now presents to E.D. from home after developing nausea/vomiting last and observing POC glucose of 600.  Admitted to MICU for HHS vs DKA. Pt clinically improved on insulin gtt, gap closed, FS wnl, asymptomatic.      Plan:  #Neuro:  AAOx3.   -no acute issues  -physical therapy consult when condition improved    #Pulm:  -supplemental O2 as needed to maintain SPO2 > 92%  -currently breathing comfortably and satting 100 on room air  -incentive spirometry 10x q 2 hrs  -HOB >/= 30 degree angle    #CV:  -Hx of HFrEF, CAD s/p stents  -troponins negative  -ecg unchanged from prior  -with hx of HFrEF, Mod Ao stenosis - obtain TTE to eval progress of dz  -continue ASA 81 mg po qd  -continue plavix 75 mg po qd  -continue atorvastin 20 mg qhs  -hold ACE-I at present (K+5.4)  -continue Metoprolol - 25mg po q 12 hrs  -continue hydralazine 25 mg po q 8 hrs    #GI/:  -NPO at present except meds  -protonix 40 mg IV qd  -strict I & O's  -pt is ESRD on HD; M/T/TH/Sat will need H.D. therapy  -when HHS/DKA controlled start no concentrated sweet/renal diet as tolerated    #I.D.:  -pt currently without leukocytosis or febrile  -RVP negative  -no need for antibx at present    #FEN/ENDO/HEME:  -cmp/mg++/po--4/cbc/coags/ketones/abg/osmololity now  -bmp/mg++/po--4/acetone/vbg/osmololity q 4hrs  -as pt is ESRD will hold standing IV fluid - plan for H.D. T/Th/Sa  -on Insulin gtt - titrate to glucose 160 to 180, AG <15, HCO3 >18, ph>7.3  -POC glucose q 1 hr  -D10 @ 40 as gap closed and FS wnl  -will call Endo to set up insulin pump

## 2018-10-14 NOTE — PROGRESS NOTE ADULT - SUBJECTIVE AND OBJECTIVE BOX
INTERVAL HPI/OVERNIGHT EVENTS:    SUBJECTIVE: Patient seen and examined at bedside.     CONSTITUTIONAL: No weakness, fevers or chills  EYES/ENT: No visual changes, no nasal congestion, no rhinorrhea, no throat pain  RESPIRATORY: No shortness of breath  CARDIOVASCULAR: No chest pain or palpitations  GASTROINTESTINAL: No abdominal pain. No nausea, vomiting, or hematemesis. Denies BM.  GENITOURINARY: No dysuria, frequency or hematuria  NEUROLOGICAL: No numbness or weakness  SKIN: No itching, rashes    OBJECTIVE:    VITAL SIGNS:  ICU Vital Signs Last 24 Hrs  T(C): 36.7 (14 Oct 2018 08:00), Max: 36.9 (13 Oct 2018 09:07)  T(F): 98.1 (14 Oct 2018 08:00), Max: 98.5 (13 Oct 2018 09:07)  HR: 74 (14 Oct 2018 07:00) (63 - 87)  BP: 156/69 (14 Oct 2018 07:00) (107/59 - 183/70)  BP(mean): 99 (14 Oct 2018 07:00) (79 - 108)  ABP: --  ABP(mean): --  RR: 12 (14 Oct 2018 07:00) (9 - 22)  SpO2: 96% (14 Oct 2018 07:00) (91% - 98%)        10-13 @ 07:01  -  10-14 @ 07:00  --------------------------------------------------------  IN: 994.5 mL / OUT: 0 mL / NET: 994.5 mL      CAPILLARY BLOOD GLUCOSE  107 (14 Oct 2018 07:00)      POCT Blood Glucose.: 123 mg/dL (14 Oct 2018 08:12)      PHYSICAL EXAM:    General: NAD  HEENT: NC/AT; PERRL, clear conjunctiva  Neck: supple  Respiratory: CTA b/l  Cardiovascular: +S1/S2; RRR  Abdomen: soft, NT/ND; +BS x4  Extremities: WWP, 2+ peripheral pulses b/l; no LE edema  Skin: normal color and turgor; no rash  Neurological:    MEDICATIONS:  MEDICATIONS  (STANDING):  aspirin  chewable 81 milliGRAM(s) Oral daily  atorvastatin 20 milliGRAM(s) Oral at bedtime  clopidogrel Tablet 75 milliGRAM(s) Oral daily  dextrose 10%. 1000 milliLiter(s) (40 mL/Hr) IV Continuous <Continuous>  heparin  Injectable 5000 Unit(s) SubCutaneous every 8 hours  hydrALAZINE 25 milliGRAM(s) Oral every 8 hours  insulin Infusion 0.5 Unit(s)/Hr (0.5 mL/Hr) IV Continuous <Continuous>  pantoprazole  Injectable 40 milliGRAM(s) IV Push daily    MEDICATIONS  (PRN):      ALLERGIES:  No Known Allergies        LABS:                        8.9    5.2   )-----------( 200      ( 14 Oct 2018 00:19 )             27.4     10-14    134<L>  |  96  |  11  ----------------------------<  169<H>  4.5   |  24  |  3.46<H>    Ca    8.2<L>      14 Oct 2018 04:07  Phos  4.1     10-14  Mg     2.0     10-14    TPro  6.8  /  Alb  3.9  /  TBili  0.4  /  DBili  x   /  AST  15  /  ALT  8<L>  /  AlkPhos  121<H>  10-13    PT/INR - ( 14 Oct 2018 00:19 )   PT: 11.9 sec;   INR: 1.10 ratio         PTT - ( 14 Oct 2018 00:19 )  PTT:27.2 sec      RADIOLOGY & ADDITIONAL TESTS: Reviewed. INTERVAL HPI/OVERNIGHT EVENTS:    SUBJECTIVE: Patient seen and examined at bedside. Pt sitting up at edge of bed, smiling, says she feels well. Would like to eat. Denies headache, visual changes, SOB, cp, abd pain, n/v/d. No new complaints.    CONSTITUTIONAL: No weakness, fevers or chills  EYES/ENT: No visual changes, no nasal congestion, no rhinorrhea, no throat pain  RESPIRATORY: No shortness of breath  CARDIOVASCULAR: No chest pain or palpitations  GASTROINTESTINAL: No abdominal pain. No nausea, vomiting, or hematemesis. Denies BM.  GENITOURINARY: No dysuria, frequency or hematuria  NEUROLOGICAL: No numbness or weakness  SKIN: No itching, rashes    OBJECTIVE:    VITAL SIGNS:  ICU Vital Signs Last 24 Hrs  T(C): 36.7 (14 Oct 2018 08:00), Max: 36.9 (13 Oct 2018 09:07)  T(F): 98.1 (14 Oct 2018 08:00), Max: 98.5 (13 Oct 2018 09:07)  HR: 74 (14 Oct 2018 07:00) (63 - 87)  BP: 156/69 (14 Oct 2018 07:00) (107/59 - 183/70)  BP(mean): 99 (14 Oct 2018 07:00) (79 - 108)  ABP: --  ABP(mean): --  RR: 12 (14 Oct 2018 07:00) (9 - 22)  SpO2: 96% (14 Oct 2018 07:00) (91% - 98%)        10-13 @ 07:01  -  10-14 @ 07:00  --------------------------------------------------------  IN: 994.5 mL / OUT: 0 mL / NET: 994.5 mL      CAPILLARY BLOOD GLUCOSE  107 (14 Oct 2018 07:00)      POCT Blood Glucose.: 123 mg/dL (14 Oct 2018 08:12)      PHYSICAL EXAM:    General: NAD  HEENT: NC/AT; PERRL, clear conjunctiva  Neck: supple  Respiratory: CTA b/l  Cardiovascular: +S1/S2; RRR  Abdomen: soft, NT/ND; +BS x4  Extremities: WWP, 2+ peripheral pulses b/l; no LE edema  Skin: normal color and turgor; no rash  Neurological:    MEDICATIONS:  MEDICATIONS  (STANDING):  aspirin  chewable 81 milliGRAM(s) Oral daily  atorvastatin 20 milliGRAM(s) Oral at bedtime  clopidogrel Tablet 75 milliGRAM(s) Oral daily  dextrose 10%. 1000 milliLiter(s) (40 mL/Hr) IV Continuous <Continuous>  heparin  Injectable 5000 Unit(s) SubCutaneous every 8 hours  hydrALAZINE 25 milliGRAM(s) Oral every 8 hours  insulin Infusion 0.5 Unit(s)/Hr (0.5 mL/Hr) IV Continuous <Continuous>  pantoprazole  Injectable 40 milliGRAM(s) IV Push daily    MEDICATIONS  (PRN):      ALLERGIES:  No Known Allergies        LABS:                        8.9    5.2   )-----------( 200      ( 14 Oct 2018 00:19 )             27.4     10-14    134<L>  |  96  |  11  ----------------------------<  169<H>  4.5   |  24  |  3.46<H>    Ca    8.2<L>      14 Oct 2018 04:07  Phos  4.1     10-14  Mg     2.0     10-14    TPro  6.8  /  Alb  3.9  /  TBili  0.4  /  DBili  x   /  AST  15  /  ALT  8<L>  /  AlkPhos  121<H>  10-13    PT/INR - ( 14 Oct 2018 00:19 )   PT: 11.9 sec;   INR: 1.10 ratio         PTT - ( 14 Oct 2018 00:19 )  PTT:27.2 sec      RADIOLOGY & ADDITIONAL TESTS: Reviewed.

## 2018-10-14 NOTE — PROGRESS NOTE ADULT - PROBLEM SELECTOR PLAN 1
-Will transition patient back to insulin pump at her usual home settings  -Overlap with insulin gtt by 1 hour

## 2018-10-14 NOTE — PROGRESS NOTE ADULT - ATTENDING COMMENTS
Pt seen and examined. 63 F with type 1 DM on an insulin pump, ESRD on HD, CAD s/p PCI, HFrEF, recurrent admissions due to DKA (last in 7/18), now readmitted with N/V, found to have DKA. Remains on an insulin gtt with Q1H FS overnight. Also required D10 gtt. AG now closed . Will resume diabetic diet. Transition to home dose on the insulin pump with a 1-2 hour overlap with the insulin drip.   - Critical Care time Spent: 35 mins Pt seen and examined. 63 F with type 1 DM on an insulin pump, ESRD on HD, CAD s/p PCI, HFrEF, recurrent admissions due to DKA (last in 7/18), now readmitted with N/V, found to have DKA. Remained on an insulin gtt with Q1H FS overnight. Also required D10 gtt. AG now closed . Will resume diabetic diet. Transition to home dose on the insulin pump with a 1-2 hour overlap with the insulin drip. Dialyzed yesterday, stable electrolytes.   - Critical Care time Spent: 35 mins

## 2018-10-14 NOTE — CONSULT NOTE ADULT - ASSESSMENT
76 f with  DKA resolved- BS control, insulin pump to be  restarted. Endocrine follow  CAD- stable  CHF- stable  PVD- no intervention now  ESRD- HD Nephrology follow Dr. Brayan Viera MD pager [phone]
ESRD on HD 4x/weekly, last HD two days ago, CAD w/ 8 stents, PVD, DM on insulin pump, CHF, HTN who presents with DKA.
63 yr F with T1DM c/b ESRD on HD, CAD, TIA, PVD, neuropathy here with nausea/vomiting. Patient found to have glcuose >600 AG 27 HCO3 23 BHB 2.4 Urine ketones pending pH 7.33. Patient is mildly ketotic not significantly acidotic.

## 2018-10-14 NOTE — PROGRESS NOTE ADULT - ASSESSMENT
63 yr F with T1DM c/b ESRD on HD, CAD, TIA, PVD, neuropathy here with nausea/vomiting. Patient found to have glcuose >600 AG 27 HCO3 23 BHB 2.4 Urine ketones pending pH 7.33.

## 2018-10-14 NOTE — PROGRESS NOTE ADULT - SUBJECTIVE AND OBJECTIVE BOX
Chief Complaint: T1DM on insulin pump     History: Patient's glucose levels are improved and AG is closed.     MEDICATIONS  (STANDING):  aspirin  chewable 81 milliGRAM(s) Oral daily  atorvastatin 20 milliGRAM(s) Oral at bedtime  clopidogrel Tablet 75 milliGRAM(s) Oral daily  dextrose 10%. 1000 milliLiter(s) (40 mL/Hr) IV Continuous <Continuous>  heparin  Injectable 5000 Unit(s) SubCutaneous every 8 hours  hydrALAZINE 25 milliGRAM(s) Oral every 8 hours  insulin Infusion 0.5 Unit(s)/Hr (0.5 mL/Hr) IV Continuous <Continuous>  pantoprazole  Injectable 40 milliGRAM(s) IV Push daily    MEDICATIONS  (PRN):      Allergies    No Known Allergies    Intolerances      Review of Systems:  Constitutional: No fever  Eyes: No blurry vision  Neuro: No tremors  HEENT: No pain  Cardiovascular: No chest pain, palpitations  Respiratory: No SOB, no cough  GI: No nausea, vomiting, abdominal pain  : No dysuria  Skin: no rash  Psych: no depression  Endocrine: no polyuria, polydipsia  Hem/lymph: no swelling  Osteoporosis: no fractures    ALL OTHER SYSTEMS REVIEWED AND NEGATIVE        PHYSICAL EXAM:  VITALS: T(C): 36.7 (10-14-18 @ 08:00)  T(F): 98.1 (10-14-18 @ 08:00), Max: 98.5 (10-13-18 @ 19:15)  HR: 79 (10-14-18 @ 13:00) (63 - 87)  BP: 151/67 (10-14-18 @ 13:00) (107/59 - 180/85)  RR:  (9 - 26)  SpO2:  (91% - 99%)  Wt(kg): --  GENERAL: NAD, well-groomed, well-developed  EYES: No proptosis, no lid lag, anicteric  HEENT:  Atraumatic, Normocephalic, moist mucous membranes  THYROID: Normal size, no palpable nodules  RESPIRATORY: Clear to auscultation bilaterally; No rales, rhonchi, wheezing, or rubs  CARDIOVASCULAR: Regular rate and rhythm; No murmurs; no peripheral edema  GI: Soft, nontender, non distended, normal bowel sounds  SKIN: Dry, intact, No rashes or lesions  MUSCULOSKELETAL: Full range of motion, normal strength  NEURO: sensation intact, extraocular movements intact, no tremor, normal reflexes  PSYCH: Alert and oriented x 3, normal affect, normal mood  CUSHING'S SIGNS: no striae    POCT Blood Glucose.: 174 mg/dL (10-14-18 @ 12:19)  POCT Blood Glucose.: 154 mg/dL (10-14-18 @ 11:00)  POCT Blood Glucose.: 129 mg/dL (10-14-18 @ 09:59)  POCT Blood Glucose.: 124 mg/dL (10-14-18 @ 09:13)  POCT Blood Glucose.: 123 mg/dL (10-14-18 @ 08:12)  POCT Blood Glucose.: 109 mg/dL (10-14-18 @ 06:56)  POCT Blood Glucose.: 101 mg/dL (10-14-18 @ 05:52)  POCT Blood Glucose.: 110 mg/dL (10-14-18 @ 04:59)  POCT Blood Glucose.: 170 mg/dL (10-14-18 @ 04:02)  POCT Blood Glucose.: 228 mg/dL (10-14-18 @ 03:01)  POCT Blood Glucose.: 318 mg/dL (10-14-18 @ 02:05)  POCT Blood Glucose.: 312 mg/dL (10-14-18 @ 01:00)  POCT Blood Glucose.: 235 mg/dL (10-14-18 @ 00:04)  POCT Blood Glucose.: 188 mg/dL (10-13-18 @ 22:55)  POCT Blood Glucose.: 199 mg/dL (10-13-18 @ 22:01)  POCT Blood Glucose.: 141 mg/dL (10-13-18 @ 21:06)  POCT Blood Glucose.: 147 mg/dL (10-13-18 @ 19:56)  POCT Blood Glucose.: 140 mg/dL (10-13-18 @ 18:54)  POCT Blood Glucose.: 147 mg/dL (10-13-18 @ 18:01)  POCT Blood Glucose.: 126 mg/dL (10-13-18 @ 17:05)  POCT Blood Glucose.: 109 mg/dL (10-13-18 @ 16:02)  POCT Blood Glucose.: 160 mg/dL (10-13-18 @ 15:10)  POCT Blood Glucose.: 216 mg/dL (10-13-18 @ 14:19)  POCT Blood Glucose.: 293 mg/dL (10-13-18 @ 13:18)  POCT Blood Glucose.: 378 mg/dL (10-13-18 @ 12:08)  POCT Blood Glucose.: 506 mg/dL (10-13-18 @ 11:07)  POCT Blood Glucose.: >600 mg/dL (10-13-18 @ 09:16)      10-14    136  |  97  |  12  ----------------------------<  159<H>  5.1   |  26  |  3.95<H>    EGFR if : 13<L>  EGFR if non : 12<L>    Ca    7.9<L>      10-14  Mg     2.1     10-14  Phos  4.4     10-14    TPro  6.8  /  Alb  3.9  /  TBili  0.4  /  DBili  x   /  AST  15  /  ALT  8<L>  /  AlkPhos  121<H>  10-13

## 2018-10-14 NOTE — CONSULT NOTE ADULT - SUBJECTIVE AND OBJECTIVE BOX
HPI:  63 yr old female with PMHx DM type 1 on insulin pump, frequent admissions for DKA , last hospitalized July 2018 in which she required balloon fistuloplasty of her Lt AVF 2/2 to stenosis. ESRD on H.D. M/T/TH/Sat (last H.D. this past Thursday 10/11/18 with removal of 2.5 liters), CAD s/p stents, HFrEF 52%, Mod Ao stenosis, HLD, CVA, PVD who now presents to E.D. from home after developing nausea/vomiting last evening and observing POC glucose of 600 (glucose range 200 to 250). Pt endorses NBNB vomitus q 2 hrs since 2300 last evening (10/12/18). Pt states insulin pump in place and functioning well. In E.D. found to be hyperglycemic of 663, Beta hydroxybutyrate of 2.4 (Nl </= 0.4), serum HCO3 23 (baseline 23 to 27), AG 27, sCr 6.7, K+ 5.4 ( ECG without ST-T changes) Vph 7.33, BNP 68202. In E.D. Received 1 liter NS, started on insulin gtt, insulin pump d/c'ed as gtt started. Admitted to MICU for HHS vs DKA    In MICU, pt placed on Insulin gtt, titrated to glucose 160 to 180, AG <15, HCO3 >18, ph>7.3. POC glucose q 1 hr. Endo following. Per Endo, pump was inspected and no evidence of malfunction was found. The following day, the pt's gap had closed and her fingersticks were wnl so D10 @30-40/hr was started along with the insulin drip. Transitioned pt from insulin gtt and D10 to home insulin pump (on home regimen) and regular PO diet. Pt tolerated transition well.   Pt currently clinically improved, asymptomatic, and doing well on home insulin pump.  denies diarrhea, fever, chills, cough, sob, c/p (13 Oct 2018 11:30)      PAST MEDICAL & SURGICAL HISTORY:  CHF (congestive heart failure): EF 40-45%  Subclavian vein stenosis, left: s/p stent  DKA, type 1: 1/2015  ACS (acute coronary syndrome): 1/2015 - cath revealed 100% ostial stenosis not amenable to PCI - medical management  TIA (transient ischemic attack): x 2 - 8-9 years ago prior to ASD/VSD repair  CAD (coronary artery disease): s/p stents  Gout: past  CVA (cerebral infarction): with no residual, 8 yrs ago, prior to heart surgery - ST memory loss  Peripheral vascular disease: occluded left fem-pop bypass 5/2015  Diabetes mellitus type 1: Insulin Dependent - [employer]  Minimed Paradigm Insulin Pump - Novolog  ESRD (end stage renal disease): dialysis  M, tue, thursday, saturday  Hyperlipidemia  Status post device closure of ASD: &quot;clamshell&quot;  History of cardiac catheterization: 1/2015 - no intervention  S/P femoral-popliteal bypass surgery: L and R in 2013 with graft; 5/2015 CFA angioplasty left and ileofemoral endarterectomywith vein patch angioplasty of left fem-pop bypass graft  Multiple vascular surgery both leg, left fempop bypass revision 11/2015  AV (arteriovenous fistula): Left AV graft; revision with stent placement 2-3 years ago  S/P cholecystectomy      Review of Systems:   CONSTITUTIONAL: No fever, weight loss, or fatigue  EYES: No eye pain, visual disturbances, or discharge  ENMT:  No difficulty hearing, tinnitus, vertigo; No sinus or throat pain  NECK: No pain or stiffness  BREASTS: No pain, masses, or nipple discharge  RESPIRATORY: No cough, wheezing, chills or hemoptysis; No shortness of breath  CARDIOVASCULAR: No chest pain, palpitations, dizziness, or leg swelling  GASTROINTESTINAL: No abdominal or epigastric pain. No nausea, vomiting, or hematemesis; No diarrhea or constipation. No melena or hematochezia.  GENITOURINARY: No dysuria, frequency, hematuria, or incontinence  NEUROLOGICAL: No headaches, memory loss, loss of strength, numbness, or tremors  SKIN: No itching, burning, rashes, or lesions   LYMPH NODES: No enlarged glands  ENDOCRINE: No heat or cold intolerance; No hair loss  MUSCULOSKELETAL: No joint pain or swelling; No muscle, back, or extremity pain  PSYCHIATRIC: No depression, anxiety, mood swings, or difficulty sleeping  HEME/LYMPH: No easy bruising, or bleeding gums  ALLERY AND IMMUNOLOGIC: No hives or eczema    Allergies    No Known Allergies    Intolerances        Social History:     FAMILY HISTORY:  Family history of smoking  Family history of hypertension  Family history of cancer (Sibling)      MEDICATIONS  (STANDING):  aspirin  chewable 81 milliGRAM(s) Oral daily  atorvastatin 20 milliGRAM(s) Oral at bedtime  clopidogrel Tablet 75 milliGRAM(s) Oral daily  heparin  Injectable 5000 Unit(s) SubCutaneous every 8 hours  hydrALAZINE 50 milliGRAM(s) Oral every 8 hours    MEDICATIONS  (PRN):  oxyCODONE    5 mG/acetaminophen 325 mG 1 Tablet(s) Oral once PRN Foot pain 7-10        CAPILLARY BLOOD GLUCOSE  180 (14 Oct 2018 13:00)  107 (14 Oct 2018 07:00)  101 (14 Oct 2018 06:00)  110 (14 Oct 2018 05:00)  170 (14 Oct 2018 04:00)  228 (14 Oct 2018 03:00)  318 (14 Oct 2018 02:00)  312 (14 Oct 2018 01:00)  235 (14 Oct 2018 00:00)  188 (13 Oct 2018 23:00)  199 (13 Oct 2018 22:00)  141 (13 Oct 2018 21:00)      POCT Blood Glucose.: 230 mg/dL (14 Oct 2018 16:53)  POCT Blood Glucose.: 180 mg/dL (14 Oct 2018 13:17)  POCT Blood Glucose.: 174 mg/dL (14 Oct 2018 12:19)  POCT Blood Glucose.: 154 mg/dL (14 Oct 2018 11:00)  POCT Blood Glucose.: 129 mg/dL (14 Oct 2018 09:59)  POCT Blood Glucose.: 124 mg/dL (14 Oct 2018 09:13)  POCT Blood Glucose.: 123 mg/dL (14 Oct 2018 08:12)  POCT Blood Glucose.: 109 mg/dL (14 Oct 2018 06:56)  POCT Blood Glucose.: 101 mg/dL (14 Oct 2018 05:52)  POCT Blood Glucose.: 110 mg/dL (14 Oct 2018 04:59)  POCT Blood Glucose.: 170 mg/dL (14 Oct 2018 04:02)  POCT Blood Glucose.: 228 mg/dL (14 Oct 2018 03:01)  POCT Blood Glucose.: 318 mg/dL (14 Oct 2018 02:05)  POCT Blood Glucose.: 312 mg/dL (14 Oct 2018 01:00)  POCT Blood Glucose.: 235 mg/dL (14 Oct 2018 00:04)  POCT Blood Glucose.: 188 mg/dL (13 Oct 2018 22:55)  POCT Blood Glucose.: 199 mg/dL (13 Oct 2018 22:01)  POCT Blood Glucose.: 141 mg/dL (13 Oct 2018 21:06)    I&O's Summary    13 Oct 2018 07:01  -  14 Oct 2018 07:00  --------------------------------------------------------  IN: 994.5 mL / OUT: 0 mL / NET: 994.5 mL    14 Oct 2018 07:01  -  14 Oct 2018 20:16  --------------------------------------------------------  IN: 943.5 mL / OUT: 0 mL / NET: 943.5 mL        PHYSICAL EXAM:  GENERAL: NAD, frail  HEAD:  Atraumatic, Normocephalic  EYES: EOMI, PERRLA, conjunctiva and sclera clear  NECK: Supple, No JVD  CHEST/LUNG: Clear to auscultation bilaterally; No wheeze  HEART: Regular rate and rhythm; No murmurs, rubs, or gallops  ABDOMEN: Soft, Nontender, Nondistended; Bowel sounds present  EXTREMITIES:  2+ Peripheral Pulses, No clubbing, cyanosis, or edema  PSYCH: AAOx3  NEUROLOGY: non-focal  SKIN: No rashes or lesions    LABS:                        8.9    5.2   )-----------( 200      ( 14 Oct 2018 00:19 )             27.4     10-14    136  |  97  |  12  ----------------------------<  159<H>  5.1   |  26  |  3.95<H>    Ca    7.9<L>      14 Oct 2018 11:06  Phos  4.4     10-14  Mg     2.1     10-14    TPro  6.8  /  Alb  3.9  /  TBili  0.4  /  DBili  x   /  AST  15  /  ALT  8<L>  /  AlkPhos  121<H>  10-13    PT/INR - ( 14 Oct 2018 00:19 )   PT: 11.9 sec;   INR: 1.10 ratio         PTT - ( 14 Oct 2018 00:19 )  PTT:27.2 sec          RADIOLOGY & ADDITIONAL TESTS:    Imaging Personally Reviewed:    Consultant(s) Notes Reviewed:      Care Discussed with Consultants/Other Providers:

## 2018-10-14 NOTE — PROGRESS NOTE ADULT - SUBJECTIVE AND OBJECTIVE BOX
[facility]   [phone]  Dr. Urban (covering for Dr. Burger)  RENAL FOLLOW UP NOTE  --------------------------------------------------------------------------------  Patient seen and examined.  OOB to chair.  s/p HD yesterday.  reports resolved nausea    PAST HISTORY  --------------------------------------------------------------------------------  No significant changes to PMH, PSH, FHx, SHx, unless otherwise noted    ALLERGIES & MEDICATIONS  --------------------------------------------------------------------------------  Allergies    No Known Allergies    Intolerances      Standing Inpatient Medications  aspirin  chewable 81 milliGRAM(s) Oral daily  atorvastatin 20 milliGRAM(s) Oral at bedtime  clopidogrel Tablet 75 milliGRAM(s) Oral daily  dextrose 10%. 1000 milliLiter(s) IV Continuous <Continuous>  heparin  Injectable 5000 Unit(s) SubCutaneous every 8 hours  hydrALAZINE 25 milliGRAM(s) Oral every 8 hours  insulin Infusion 0.5 Unit(s)/Hr IV Continuous <Continuous>  pantoprazole  Injectable 40 milliGRAM(s) IV Push daily    PRN Inpatient Medications      FOCUSED REVIEW OF SYSTEMS  --------------------------------------------------------------------------------  denies fevers/rigors  denies CP/palpitations  denies SOB/cough  denies N/V/abd pain      VITALS/PHYSICAL EXAM  --------------------------------------------------------------------------------  T(C): 36.7 (10-14-18 @ 08:00), Max: 36.9 (10-13-18 @ 19:15)  HR: 79 (10-14-18 @ 13:00) (63 - 87)  BP: 151/67 (10-14-18 @ 13:00) (107/59 - 180/85)  RR: 14 (10-14-18 @ 13:00) (9 - 26)  SpO2: 96% (10-14-18 @ 13:00) (91% - 99%)  Wt(kg): --  Height (cm): 160.02 (10-13-18 @ 09:07)  Weight (kg): 53.1 (10-13-18 @ 12:25)  BMI (kg/m2): 20.7 (10-13-18 @ 12:25)  BSA (m2): 1.54 (10-13-18 @ 12:25)      10-13-18 @ 07:01  -  10-14-18 @ 07:00  --------------------------------------------------------  IN: 994.5 mL / OUT: 0 mL / NET: 994.5 mL    10-14-18 @ 07:01  -  10-14-18 @ 13:48  --------------------------------------------------------  IN: 81 mL / OUT: 0 mL / NET: 81 mL      Physical Exam:  	Gen: NAD, frail-appearing  	Pulm: CTA B/L  	CV: RRR, S1S2  	Abd: +BS, soft, nontender/nondistended  	: No suprapubic tenderness.  no irene          Extremity: No cyanosis, no edema  	Neuro: A&O x 3              Access: LUE AVF + thrill      LABS/STUDIES  --------------------------------------------------------------------------------              8.9    5.2   >-----------<  200      [10-14-18 @ 00:19]              27.4     136  |  97  |  12  ----------------------------<  159      [10-14-18 @ 11:06]  5.1   |  26  |  3.95        Ca     7.9     [10-14-18 @ 11:06]      Mg     2.1     [10-14-18 @ 11:06]      Phos  4.4     [10-14-18 @ 11:06]    TPro  6.8  /  Alb  3.9  /  TBili  0.4  /  DBili  x   /  AST  15  /  ALT  8   /  AlkPhos  121  [10-13-18 @ 13:05]    PT/INR: PT 11.9 , INR 1.10       [10-14-18 @ 00:19]  PTT: 27.2       [10-14-18 @ 00:19]    Serum Osmolality 280      [10-14-18 @ 11:06]    eGFR if Non African American: 12 mL/min/1.73M2 (10-14 @ 11:06)  eGFR if : 13 mL/min/1.73M2 (10-14 @ 11:06)  eGFR if Non African American: 14 mL/min/1.73M2 (10-14 @ 04:07)  eGFR if African American: 16 mL/min/1.73M2 (10-14 @ 04:07)  eGFR if Non African American: 17 mL/min/1.73M2 (10-14 @ 00:19)  eGFR if : 19 mL/min/1.73M2 (10-14 @ 00:19)  eGFR if Non African American: 20 mL/min/1.73M2 (10-13 @ 20:04)  eGFR if : 23 mL/min/1.73M2 (10-13 @ 20:04)  eGFR if Non African American: 10 mL/min/1.73M2 (10-13 @ 16:16)  eGFR if : 12 mL/min/1.73M2 (10-13 @ 16:16)    Creatinine Trend:  SCr 3.95 [10-14 @ 11:06]  SCr 3.46 [10-14 @ 04:07]  SCr 2.93 [10-14 @ 00:19]  SCr 2.50 [10-13 @ 20:04]  SCr 4.45 [10-13 @ 16:16]                  PTH -- (Ca 8.3)      [03-03-18 @ 08:53]   134  HbA1c 7.2      [07-03-18 @ 05:01]  TSH 1.07      [12-19-17 @ 06:12]  Lipid: chol 113, TG 86, HDL 59, LDL 37      [04-30-18 @ 06:44]

## 2018-10-15 ENCOUNTER — APPOINTMENT (OUTPATIENT)
Dept: CT IMAGING | Facility: CLINIC | Age: 63
End: 2018-10-15

## 2018-10-15 LAB
ALBUMIN SERPL ELPH-MCNC: 3.8 G/DL — SIGNIFICANT CHANGE UP (ref 3.3–5)
ALP SERPL-CCNC: 115 U/L — SIGNIFICANT CHANGE UP (ref 40–120)
ALT FLD-CCNC: 9 U/L — LOW (ref 10–45)
ANION GAP SERPL CALC-SCNC: 18 MMOL/L — HIGH (ref 5–17)
APTT BLD: 27.9 SEC — SIGNIFICANT CHANGE UP (ref 27.5–37.4)
AST SERPL-CCNC: 14 U/L — SIGNIFICANT CHANGE UP (ref 10–40)
BILIRUB SERPL-MCNC: 0.4 MG/DL — SIGNIFICANT CHANGE UP (ref 0.2–1.2)
BUN SERPL-MCNC: 29 MG/DL — HIGH (ref 7–23)
CALCIUM SERPL-MCNC: 7.8 MG/DL — LOW (ref 8.4–10.5)
CHLORIDE SERPL-SCNC: 92 MMOL/L — LOW (ref 96–108)
CO2 SERPL-SCNC: 22 MMOL/L — SIGNIFICANT CHANGE UP (ref 22–31)
CREAT SERPL-MCNC: 6.09 MG/DL — HIGH (ref 0.5–1.3)
GLUCOSE BLDC GLUCOMTR-MCNC: 223 MG/DL — HIGH (ref 70–99)
GLUCOSE BLDC GLUCOMTR-MCNC: 255 MG/DL — HIGH (ref 70–99)
GLUCOSE BLDC GLUCOMTR-MCNC: 269 MG/DL — HIGH (ref 70–99)
GLUCOSE BLDC GLUCOMTR-MCNC: 291 MG/DL — HIGH (ref 70–99)
GLUCOSE BLDC GLUCOMTR-MCNC: 354 MG/DL — HIGH (ref 70–99)
GLUCOSE BLDC GLUCOMTR-MCNC: 385 MG/DL — HIGH (ref 70–99)
GLUCOSE SERPL-MCNC: 361 MG/DL — HIGH (ref 70–99)
HCT VFR BLD CALC: 30 % — LOW (ref 34.5–45)
HGB BLD-MCNC: 9.7 G/DL — LOW (ref 11.5–15.5)
INR BLD: 0.94 RATIO — SIGNIFICANT CHANGE UP (ref 0.88–1.16)
MAGNESIUM SERPL-MCNC: 2.2 MG/DL — SIGNIFICANT CHANGE UP (ref 1.6–2.6)
MCHC RBC-ENTMCNC: 32.3 GM/DL — SIGNIFICANT CHANGE UP (ref 32–36)
MCHC RBC-ENTMCNC: 32.8 PG — SIGNIFICANT CHANGE UP (ref 27–34)
MCV RBC AUTO: 101.4 FL — HIGH (ref 80–100)
PHOSPHATE SERPL-MCNC: 4.9 MG/DL — HIGH (ref 2.5–4.5)
PLATELET # BLD AUTO: 223 K/UL — SIGNIFICANT CHANGE UP (ref 150–400)
POTASSIUM SERPL-MCNC: 5.3 MMOL/L — SIGNIFICANT CHANGE UP (ref 3.5–5.3)
POTASSIUM SERPL-SCNC: 5.3 MMOL/L — SIGNIFICANT CHANGE UP (ref 3.5–5.3)
PROT SERPL-MCNC: 6.7 G/DL — SIGNIFICANT CHANGE UP (ref 6–8.3)
PROTHROM AB SERPL-ACNC: 10.6 SEC — SIGNIFICANT CHANGE UP (ref 10–13.1)
RBC # BLD: 2.96 M/UL — LOW (ref 3.8–5.2)
RBC # FLD: 15 % — HIGH (ref 10.3–14.5)
SODIUM SERPL-SCNC: 132 MMOL/L — LOW (ref 135–145)
WBC # BLD: 5.77 K/UL — SIGNIFICANT CHANGE UP (ref 3.8–10.5)
WBC # FLD AUTO: 5.77 K/UL — SIGNIFICANT CHANGE UP (ref 3.8–10.5)

## 2018-10-15 PROCEDURE — 99233 SBSQ HOSP IP/OBS HIGH 50: CPT

## 2018-10-15 PROCEDURE — 12345: CPT | Mod: NC

## 2018-10-15 RX ORDER — DEXTROSE 50 % IN WATER 50 %
15 SYRINGE (ML) INTRAVENOUS ONCE
Qty: 0 | Refills: 0 | Status: DISCONTINUED | OUTPATIENT
Start: 2018-10-15 | End: 2018-10-17

## 2018-10-15 RX ORDER — SODIUM CHLORIDE 9 MG/ML
1000 INJECTION, SOLUTION INTRAVENOUS
Qty: 0 | Refills: 0 | Status: DISCONTINUED | OUTPATIENT
Start: 2018-10-15 | End: 2018-10-17

## 2018-10-15 RX ORDER — GLUCAGON INJECTION, SOLUTION 0.5 MG/.1ML
1 INJECTION, SOLUTION SUBCUTANEOUS ONCE
Qty: 0 | Refills: 0 | Status: DISCONTINUED | OUTPATIENT
Start: 2018-10-15 | End: 2018-10-17

## 2018-10-15 RX ORDER — DEXTROSE 50 % IN WATER 50 %
12.5 SYRINGE (ML) INTRAVENOUS ONCE
Qty: 0 | Refills: 0 | Status: DISCONTINUED | OUTPATIENT
Start: 2018-10-15 | End: 2018-10-17

## 2018-10-15 RX ORDER — DEXTROSE 50 % IN WATER 50 %
25 SYRINGE (ML) INTRAVENOUS ONCE
Qty: 0 | Refills: 0 | Status: DISCONTINUED | OUTPATIENT
Start: 2018-10-15 | End: 2018-10-17

## 2018-10-15 RX ORDER — OXYCODONE AND ACETAMINOPHEN 5; 325 MG/1; MG/1
1 TABLET ORAL ONCE
Qty: 0 | Refills: 0 | Status: DISCONTINUED | OUTPATIENT
Start: 2018-10-15 | End: 2018-10-15

## 2018-10-15 RX ORDER — INSULIN LISPRO 100/ML
1 VIAL (ML) SUBCUTANEOUS
Qty: 0 | Refills: 0 | Status: DISCONTINUED | OUTPATIENT
Start: 2018-10-15 | End: 2018-10-17

## 2018-10-15 RX ADMIN — OXYCODONE AND ACETAMINOPHEN 1 TABLET(S): 5; 325 TABLET ORAL at 00:10

## 2018-10-15 RX ADMIN — Medication 50 MILLIGRAM(S): at 06:18

## 2018-10-15 RX ADMIN — ATORVASTATIN CALCIUM 20 MILLIGRAM(S): 80 TABLET, FILM COATED ORAL at 21:31

## 2018-10-15 RX ADMIN — Medication 50 MILLIGRAM(S): at 13:53

## 2018-10-15 RX ADMIN — CLOPIDOGREL BISULFATE 75 MILLIGRAM(S): 75 TABLET, FILM COATED ORAL at 13:53

## 2018-10-15 RX ADMIN — OXYCODONE AND ACETAMINOPHEN 1 TABLET(S): 5; 325 TABLET ORAL at 21:32

## 2018-10-15 RX ADMIN — OXYCODONE AND ACETAMINOPHEN 1 TABLET(S): 5; 325 TABLET ORAL at 21:31

## 2018-10-15 RX ADMIN — Medication 50 MILLIGRAM(S): at 21:31

## 2018-10-15 RX ADMIN — Medication 81 MILLIGRAM(S): at 13:53

## 2018-10-15 NOTE — PROGRESS NOTE ADULT - PROBLEM SELECTOR PLAN 1
-Will transition patient back to insulin pump at her usual home settings  -Overlap with insulin gtt by 1 hour -Will transition patient back to insulin pump at her usual home settings  -Overlap with insulin gtt by 1 hour  -monitor current settings: spent 25 minutes coordinating care with RN and NP as pt at times can be forgetful in terms of remembering to change her tubing if her bs are persistently high.

## 2018-10-15 NOTE — PROGRESS NOTE ADULT - ASSESSMENT
61 y/o female with ESRD on HD, admitted with DKA/hyperkalemia requiring urgent HD,     1- ESRD  2- HTN  3- DM/ketoacidosis     hd am   cont hydralazine 50 mg tid   check pth for shpt   cont insulin pump as above  d/w Dr. Viera

## 2018-10-15 NOTE — PROGRESS NOTE ADULT - SUBJECTIVE AND OBJECTIVE BOX
Patient is a 63y old  Female who presents with a chief complaint of Hyperglycemic Hyperosmolar Syndrome Vs. DKA (14 Oct 2018 20:15)    Out of MICU. Asked to assume care    SUBJECTIVE / OVERNIGHT EVENTS: Comfortable without new complaints.   Review of Systems  chest pain no  palpitations no  sob no  nausea no  headache no    MEDICATIONS  (STANDING):  aspirin  chewable 81 milliGRAM(s) Oral daily  atorvastatin 20 milliGRAM(s) Oral at bedtime  clopidogrel Tablet 75 milliGRAM(s) Oral daily  heparin  Injectable 5000 Unit(s) SubCutaneous every 8 hours  hydrALAZINE 50 milliGRAM(s) Oral every 8 hours  insulin lispro (HumaLOG) Pump 1 Each SubCutaneous Continuous Pump    MEDICATIONS  (PRN):      Vital Signs Last 24 Hrs  T(C): 36.8 (15 Oct 2018 07:35), Max: 36.8 (14 Oct 2018 22:11)  T(F): 98.2 (15 Oct 2018 07:35), Max: 98.3 (15 Oct 2018 06:14)  HR: 84 (15 Oct 2018 07:35) (83 - 95)  BP: 135/77 (15 Oct 2018 07:35) (133/69 - 177/81)  BP(mean): 113 (14 Oct 2018 21:00) (91 - 119)  RR: 18 (15 Oct 2018 07:35) (18 - 32)  SpO2: 98% (15 Oct 2018 07:35) (96% - 100%)    PHYSICAL EXAM:  GENERAL: NAD, well-developed  HEAD:  Atraumatic, Normocephalic  EYES: EOMI, PERRLA, conjunctiva and sclera clear  NECK: Supple, No JVD  CHEST/LUNG: Clear to auscultation bilaterally; No wheeze  HEART: Regular rate and rhythm; No murmurs, rubs, or gallops  ABDOMEN: Soft, Nontender, Nondistended; Bowel sounds present  EXTREMITIES:  2+ Peripheral Pulses, No clubbing, cyanosis, or edema  PSYCH: AAOx3  NEUROLOGY: non-focal  SKIN: No rashes or lesions    LABS:                        9.7    5.77  )-----------( 223      ( 15 Oct 2018 11:31 )             30.0     10-15    132<L>  |  92<L>  |  29<H>  ----------------------------<  361<H>  5.3   |  22  |  6.09<H>    Ca    7.8<L>      15 Oct 2018 09:04  Phos  4.9     10-15  Mg     2.2     10-15    TPro  6.7  /  Alb  3.8  /  TBili  0.4  /  DBili  x   /  AST  14  /  ALT  9<L>  /  AlkPhos  115  10-15    PT/INR - ( 15 Oct 2018 11:31 )   PT: 10.6 sec;   INR: 0.94 ratio         PTT - ( 15 Oct 2018 11:31 )  PTT:27.9 sec          Culture - Blood (collected 13 Oct 2018 11:42)  Source: .Blood Blood-Peripheral  Preliminary Report (14 Oct 2018 12:01):    No growth to date.    Culture - Blood (collected 13 Oct 2018 11:42)  Source: .Blood Blood-Peripheral  Preliminary Report (14 Oct 2018 12:01):    No growth to date.        RADIOLOGY & ADDITIONAL TESTS:    Imaging Personally Reviewed:    Consultant(s) Notes Reviewed:      Care Discussed with Consultants/Other Providers:

## 2018-10-15 NOTE — PROGRESS NOTE ADULT - ASSESSMENT
76 f with  DKA resolved- BS control, insulin pump to be restarted. Endocrine follow  CAD- stable  CHF- stable  PVD- no intervention now  ESRD- HD Nephrology follow Dr. Schmidt   DCP home   Pierce Viera MD pager [phone]

## 2018-10-15 NOTE — PROGRESS NOTE ADULT - ASSESSMENT
62 yo F with T1DM c/b ESRD on HD, CAD, TIA, PVD, neuropathy here with nausea/vomiting found to be in DKA s/p transition from insulin gtt to insulin pump on 10/15/18.

## 2018-10-15 NOTE — PROGRESS NOTE ADULT - SUBJECTIVE AND OBJECTIVE BOX
Chief Complaint/Follow-up on:     Subjective:    MEDICATIONS  (STANDING):  aspirin  chewable 81 milliGRAM(s) Oral daily  atorvastatin 20 milliGRAM(s) Oral at bedtime  clopidogrel Tablet 75 milliGRAM(s) Oral daily  heparin  Injectable 5000 Unit(s) SubCutaneous every 8 hours  hydrALAZINE 50 milliGRAM(s) Oral every 8 hours  insulin lispro (HumaLOG) Pump 1 Each SubCutaneous Continuous Pump    MEDICATIONS  (PRN):      PHYSICAL EXAM:  VITALS: T(C): 36.8 (10-15-18 @ 07:35)  T(F): 98.2 (10-15-18 @ 07:35), Max: 98.3 (10-15-18 @ 06:14)  HR: 84 (10-15-18 @ 07:35) (83 - 95)  BP: 135/77 (10-15-18 @ 07:35) (133/69 - 175/83)  RR:  (18 - 28)  SpO2:  (96% - 100%)  Wt(kg): --  GENERAL: NAD, well-groomed, well-developed  EYES: No proptosis, no injection  HEENT:  Atraumatic, Normocephalic, moist mucous membranes  THYROID: Normal size, no palpable nodules  RESPIRATORY: Clear to auscultation bilaterally; No rales, rhonchi, wheezing, or rubs  CARDIOVASCULAR: Regular rate and rhythm; No murmurs; no peripheral edema  GI: Soft, nontender, non distended, normal bowel sounds  CUSHING'S SIGNS: no striae    POCT Blood Glucose.: 255 mg/dL (10-15-18 @ 12:01)  POCT Blood Glucose.: 354 mg/dL (10-15-18 @ 08:27)  POCT Blood Glucose.: 385 mg/dL (10-15-18 @ 05:24)  POCT Blood Glucose.: 291 mg/dL (10-15-18 @ 02:24)  POCT Blood Glucose.: 141 mg/dL (10-14-18 @ 21:23)  POCT Blood Glucose.: 230 mg/dL (10-14-18 @ 16:53)  POCT Blood Glucose.: 180 mg/dL (10-14-18 @ 13:17)  POCT Blood Glucose.: 174 mg/dL (10-14-18 @ 12:19)  POCT Blood Glucose.: 154 mg/dL (10-14-18 @ 11:00)  POCT Blood Glucose.: 129 mg/dL (10-14-18 @ 09:59)  POCT Blood Glucose.: 124 mg/dL (10-14-18 @ 09:13)  POCT Blood Glucose.: 123 mg/dL (10-14-18 @ 08:12)  POCT Blood Glucose.: 109 mg/dL (10-14-18 @ 06:56)  POCT Blood Glucose.: 101 mg/dL (10-14-18 @ 05:52)  POCT Blood Glucose.: 110 mg/dL (10-14-18 @ 04:59)  POCT Blood Glucose.: 170 mg/dL (10-14-18 @ 04:02)  POCT Blood Glucose.: 228 mg/dL (10-14-18 @ 03:01)  POCT Blood Glucose.: 318 mg/dL (10-14-18 @ 02:05)  POCT Blood Glucose.: 312 mg/dL (10-14-18 @ 01:00)  POCT Blood Glucose.: 235 mg/dL (10-14-18 @ 00:04)  POCT Blood Glucose.: 188 mg/dL (10-13-18 @ 22:55)  POCT Blood Glucose.: 199 mg/dL (10-13-18 @ 22:01)  POCT Blood Glucose.: 141 mg/dL (10-13-18 @ 21:06)  POCT Blood Glucose.: 147 mg/dL (10-13-18 @ 19:56)  POCT Blood Glucose.: 140 mg/dL (10-13-18 @ 18:54)  POCT Blood Glucose.: 147 mg/dL (10-13-18 @ 18:01)  POCT Blood Glucose.: 126 mg/dL (10-13-18 @ 17:05)  POCT Blood Glucose.: 109 mg/dL (10-13-18 @ 16:02)  POCT Blood Glucose.: 160 mg/dL (10-13-18 @ 15:10)  POCT Blood Glucose.: 216 mg/dL (10-13-18 @ 14:19)  POCT Blood Glucose.: 293 mg/dL (10-13-18 @ 13:18)  POCT Blood Glucose.: 378 mg/dL (10-13-18 @ 12:08)  POCT Blood Glucose.: 506 mg/dL (10-13-18 @ 11:07)  POCT Blood Glucose.: >600 mg/dL (10-13-18 @ 09:16)    10-15    132<L>  |  92<L>  |  29<H>  ----------------------------<  361<H>  5.3   |  22  |  6.09<H>    EGFR if : 8<L>  EGFR if non : 7<L>    Ca    7.8<L>      10-15  Mg     2.2     10-15  Phos  4.9     10-15    TPro  6.7  /  Alb  3.8  /  TBili  0.4  /  DBili  x   /  AST  14  /  ALT  9<L>  /  AlkPhos  115  10-15          Thyroid Function Tests: Chief Complaint/Follow-up on: T1DM/insulin pump    Subjective: Pt seen at 1145am and her bs had been persistently in the 300s all morning. Patient denies eating this am prior to her b'fast. BS starting rising at qhs so she may need more basal, but cannot base it on one night. The pt tested her own bs just before I saw her and she notes it was 255, which was corroborated by the hospital meter. She placed her new site yesterday.    MEDICATIONS  (STANDING):  aspirin  chewable 81 milliGRAM(s) Oral daily  atorvastatin 20 milliGRAM(s) Oral at bedtime  clopidogrel Tablet 75 milliGRAM(s) Oral daily  heparin  Injectable 5000 Unit(s) SubCutaneous every 8 hours  hydrALAZINE 50 milliGRAM(s) Oral every 8 hours  insulin lispro (HumaLOG) Pump 1 Each SubCutaneous Continuous Pump    MEDICATIONS  (PRN):      PHYSICAL EXAM:  VITALS: T(C): 36.8 (10-15-18 @ 07:35)  T(F): 98.2 (10-15-18 @ 07:35), Max: 98.3 (10-15-18 @ 06:14)  HR: 84 (10-15-18 @ 07:35) (83 - 95)  BP: 135/77 (10-15-18 @ 07:35) (133/69 - 175/83)  RR:  (18 - 28)  SpO2:  (96% - 100%)  Wt(kg): --  GENERAL: NAD, well-groomed, well-developed  HEENT:  Atraumatic, Normocephalic, moist mucous membranes  RESPIRATORY: Clear to auscultation bilaterally; No rales, rhonchi, wheezing, or rubs  CARDIOVASCULAR: Regular rate and rhythm; No murmurs;   GI: Soft, nontender, non distended, normal bowel sounds  SKIN: catheter in R periumbilical area - no erythema/exudate      POCT Blood Glucose.: 255 mg/dL (10-15-18 @ 12:01)  POCT Blood Glucose.: 354 mg/dL (10-15-18 @ 08:27)  POCT Blood Glucose.: 385 mg/dL (10-15-18 @ 05:24)  POCT Blood Glucose.: 291 mg/dL (10-15-18 @ 02:24)  POCT Blood Glucose.: 141 mg/dL (10-14-18 @ 21:23)  POCT Blood Glucose.: 230 mg/dL (10-14-18 @ 16:53)  POCT Blood Glucose.: 180 mg/dL (10-14-18 @ 13:17)  POCT Blood Glucose.: 174 mg/dL (10-14-18 @ 12:19)  POCT Blood Glucose.: 154 mg/dL (10-14-18 @ 11:00)  POCT Blood Glucose.: 129 mg/dL (10-14-18 @ 09:59)  POCT Blood Glucose.: 124 mg/dL (10-14-18 @ 09:13)  POCT Blood Glucose.: 123 mg/dL (10-14-18 @ 08:12)  POCT Blood Glucose.: 109 mg/dL (10-14-18 @ 06:56)  POCT Blood Glucose.: 101 mg/dL (10-14-18 @ 05:52)  POCT Blood Glucose.: 110 mg/dL (10-14-18 @ 04:59)  POCT Blood Glucose.: 170 mg/dL (10-14-18 @ 04:02)  POCT Blood Glucose.: 228 mg/dL (10-14-18 @ 03:01)  POCT Blood Glucose.: 318 mg/dL (10-14-18 @ 02:05)  POCT Blood Glucose.: 312 mg/dL (10-14-18 @ 01:00)  POCT Blood Glucose.: 235 mg/dL (10-14-18 @ 00:04)  POCT Blood Glucose.: 188 mg/dL (10-13-18 @ 22:55)  POCT Blood Glucose.: 199 mg/dL (10-13-18 @ 22:01)  POCT Blood Glucose.: 141 mg/dL (10-13-18 @ 21:06)  POCT Blood Glucose.: 147 mg/dL (10-13-18 @ 19:56)  POCT Blood Glucose.: 140 mg/dL (10-13-18 @ 18:54)  POCT Blood Glucose.: 147 mg/dL (10-13-18 @ 18:01)  POCT Blood Glucose.: 126 mg/dL (10-13-18 @ 17:05)  POCT Blood Glucose.: 109 mg/dL (10-13-18 @ 16:02)  POCT Blood Glucose.: 160 mg/dL (10-13-18 @ 15:10)  POCT Blood Glucose.: 216 mg/dL (10-13-18 @ 14:19)  POCT Blood Glucose.: 293 mg/dL (10-13-18 @ 13:18)  POCT Blood Glucose.: 378 mg/dL (10-13-18 @ 12:08)  POCT Blood Glucose.: 506 mg/dL (10-13-18 @ 11:07)  POCT Blood Glucose.: >600 mg/dL (10-13-18 @ 09:16)    10-15    132<L>  |  92<L>  |  29<H>  ----------------------------<  361<H>  5.3   |  22  |  6.09<H>    EGFR if : 8<L>  EGFR if non : 7<L>    Ca    7.8<L>      10-15  Mg     2.2     10-15  Phos  4.9     10-15    TPro  6.7  /  Alb  3.8  /  TBili  0.4  /  DBili  x   /  AST  14  /  ALT  9<L>  /  AlkPhos  115  10-15          Thyroid Function Tests: Chief Complaint/Follow-up on: T1DM/insulin pump    Subjective: Pt seen at 1145am and her bs had been persistently in the 300s all morning. Patient denies eating this am prior to her b'fast. BS starting rising at qhs so she may need more basal, but cannot base it on one night. The pt tested her own bs just before I saw her and she notes it was 255, which was corroborated by the hospital meter. She placed her new site yesterday.    Basal   12am 0.275 units/hour  5am 0.375 units/hour    Bolus  IC 1:15 12am-12am  ISF 12am 60, 7am 40, 6pm 60  BS Target 12am 110-130, 8am 100-120  IOB 6 hours      MEDICATIONS  (STANDING):  aspirin  chewable 81 milliGRAM(s) Oral daily  atorvastatin 20 milliGRAM(s) Oral at bedtime  clopidogrel Tablet 75 milliGRAM(s) Oral daily  heparin  Injectable 5000 Unit(s) SubCutaneous every 8 hours  hydrALAZINE 50 milliGRAM(s) Oral every 8 hours  insulin lispro (HumaLOG) Pump 1 Each SubCutaneous Continuous Pump    MEDICATIONS  (PRN):      PHYSICAL EXAM:  VITALS: T(C): 36.8 (10-15-18 @ 07:35)  T(F): 98.2 (10-15-18 @ 07:35), Max: 98.3 (10-15-18 @ 06:14)  HR: 84 (10-15-18 @ 07:35) (83 - 95)  BP: 135/77 (10-15-18 @ 07:35) (133/69 - 175/83)  RR:  (18 - 28)  SpO2:  (96% - 100%)  Wt(kg): --  GENERAL: NAD, well-groomed, well-developed  HEENT:  Atraumatic, Normocephalic, moist mucous membranes  RESPIRATORY: Clear to auscultation bilaterally; No rales, rhonchi, wheezing, or rubs  CARDIOVASCULAR: Regular rate and rhythm; No murmurs;   GI: Soft, nontender, non distended, normal bowel sounds  SKIN: catheter in R periumbilical area - no erythema/exudate      POCT Blood Glucose.: 255 mg/dL (10-15-18 @ 12:01)  POCT Blood Glucose.: 354 mg/dL (10-15-18 @ 08:27)  POCT Blood Glucose.: 385 mg/dL (10-15-18 @ 05:24)  POCT Blood Glucose.: 291 mg/dL (10-15-18 @ 02:24)  POCT Blood Glucose.: 141 mg/dL (10-14-18 @ 21:23)  POCT Blood Glucose.: 230 mg/dL (10-14-18 @ 16:53)  POCT Blood Glucose.: 180 mg/dL (10-14-18 @ 13:17)  POCT Blood Glucose.: 174 mg/dL (10-14-18 @ 12:19)  POCT Blood Glucose.: 154 mg/dL (10-14-18 @ 11:00)  POCT Blood Glucose.: 129 mg/dL (10-14-18 @ 09:59)  POCT Blood Glucose.: 124 mg/dL (10-14-18 @ 09:13)  POCT Blood Glucose.: 123 mg/dL (10-14-18 @ 08:12)  POCT Blood Glucose.: 109 mg/dL (10-14-18 @ 06:56)  POCT Blood Glucose.: 101 mg/dL (10-14-18 @ 05:52)  POCT Blood Glucose.: 110 mg/dL (10-14-18 @ 04:59)  POCT Blood Glucose.: 170 mg/dL (10-14-18 @ 04:02)  POCT Blood Glucose.: 228 mg/dL (10-14-18 @ 03:01)  POCT Blood Glucose.: 318 mg/dL (10-14-18 @ 02:05)  POCT Blood Glucose.: 312 mg/dL (10-14-18 @ 01:00)  POCT Blood Glucose.: 235 mg/dL (10-14-18 @ 00:04)  POCT Blood Glucose.: 188 mg/dL (10-13-18 @ 22:55)  POCT Blood Glucose.: 199 mg/dL (10-13-18 @ 22:01)  POCT Blood Glucose.: 141 mg/dL (10-13-18 @ 21:06)  POCT Blood Glucose.: 147 mg/dL (10-13-18 @ 19:56)  POCT Blood Glucose.: 140 mg/dL (10-13-18 @ 18:54)  POCT Blood Glucose.: 147 mg/dL (10-13-18 @ 18:01)  POCT Blood Glucose.: 126 mg/dL (10-13-18 @ 17:05)  POCT Blood Glucose.: 109 mg/dL (10-13-18 @ 16:02)  POCT Blood Glucose.: 160 mg/dL (10-13-18 @ 15:10)  POCT Blood Glucose.: 216 mg/dL (10-13-18 @ 14:19)  POCT Blood Glucose.: 293 mg/dL (10-13-18 @ 13:18)  POCT Blood Glucose.: 378 mg/dL (10-13-18 @ 12:08)  POCT Blood Glucose.: 506 mg/dL (10-13-18 @ 11:07)  POCT Blood Glucose.: >600 mg/dL (10-13-18 @ 09:16)    10-15    132<L>  |  92<L>  |  29<H>  ----------------------------<  361<H>  5.3   |  22  |  6.09<H>    EGFR if : 8<L>  EGFR if non : 7<L>    Ca    7.8<L>      10-15  Mg     2.2     10-15  Phos  4.9     10-15    TPro  6.7  /  Alb  3.8  /  TBili  0.4  /  DBili  x   /  AST  14  /  ALT  9<L>  /  AlkPhos  115  10-15

## 2018-10-15 NOTE — PROGRESS NOTE ADULT - PROBLEM SELECTOR PLAN 2
Asked team to have patient complete insulin forms and have nurses launch flowsheets to record boluses  She will be due for site change on 10/17 -She will be due for site change on 10/17  -fixed the pump order as some of the settings were incorrect  Paris Colbert MD  [phone]

## 2018-10-15 NOTE — PROGRESS NOTE ADULT - SUBJECTIVE AND OBJECTIVE BOX
[facility]   [phone]  RENAL FOLLOW UP NOTE  --------------------------------------------------------------------------------  Chief Complaint:    24 hour events/subjective:    seen earlier. no c/o sob     PAST HISTORY  --------------------------------------------------------------------------------  No significant changes to PMH, PSH, FHx, SHx, unless otherwise noted    ALLERGIES & MEDICATIONS  --------------------------------------------------------------------------------  Allergies    No Known Allergies    Intolerances      Standing Inpatient Medications  aspirin  chewable 81 milliGRAM(s) Oral daily  atorvastatin 20 milliGRAM(s) Oral at bedtime  clopidogrel Tablet 75 milliGRAM(s) Oral daily  dextrose 5%. 1000 milliLiter(s) IV Continuous <Continuous>  dextrose 50% Injectable 12.5 Gram(s) IV Push once  dextrose 50% Injectable 25 Gram(s) IV Push once  dextrose 50% Injectable 25 Gram(s) IV Push once  heparin  Injectable 5000 Unit(s) SubCutaneous every 8 hours  hydrALAZINE 50 milliGRAM(s) Oral every 8 hours  insulin lispro (HumaLOG) Pump 1 Each SubCutaneous Continuous Pump    PRN Inpatient Medications  dextrose 40% Gel 15 Gram(s) Oral once PRN  glucagon  Injectable 1 milliGRAM(s) IntraMuscular once PRN      REVIEW OF SYSTEMS  --------------------------------------------------------------------------------    Gen: denies  fevers/chills,  CVS: denies chest pain/palpitations  Resp: denies SOB/Cough  GI: Denies N/V/Abd pain  : Denies dysuria    All other systems were reviewed and are negative, except as noted.    VITALS/PHYSICAL EXAM  --------------------------------------------------------------------------------  T(C): 36.7 (10-15-18 @ 15:00), Max: 36.8 (10-15-18 @ 06:14)  HR: 76 (10-15-18 @ 15:00) (76 - 85)  BP: 125/63 (10-15-18 @ 15:00) (125/63 - 135/77)  RR: 18 (10-15-18 @ 15:00) (18 - 18)  SpO2: 97% (10-15-18 @ 15:00) (97% - 98%)  Wt(kg): --        10-14-18 @ 07:01  -  10-15-18 @ 07:00  --------------------------------------------------------  IN: 943.5 mL / OUT: 0 mL / NET: 943.5 mL    10-15-18 @ 07:01  -  10-15-18 @ 22:42  --------------------------------------------------------  IN: 940 mL / OUT: 0 mL / NET: 940 mL      Physical Exam:  	    Physical Exam:  	Gen: alert oriented x3  	Pulm: Decreased breath sounds b/l bases. no rales or ronchi or wheezing  	CV: RRR, S1/S2. no rub  	Abd: +BS, soft, nontender/nondistended  	: No suprapubic tenderness.               Extremity: No cyanosis, no edema no clubbing  	    LABS/STUDIES  --------------------------------------------------------------------------------              9.7    5.77  >-----------<  223      [10-15-18 @ 11:31]              30.0     132  |  92  |  29  ----------------------------<  361      [10-15-18 @ 09:04]  5.3   |  22  |  6.09        Ca     7.8     [10-15-18 @ 09:04]      Mg     2.2     [10-15-18 @ 09:04]      Phos  4.9     [10-15-18 @ 09:04]    TPro  6.7  /  Alb  3.8  /  TBili  0.4  /  DBili  x   /  AST  14  /  ALT  9   /  AlkPhos  115  [10-15-18 @ 09:04]    PT/INR: PT 10.6 , INR 0.94       [10-15-18 @ 11:31]  PTT: 27.9       [10-15-18 @ 11:31]    Serum Osmolality 280      [10-14-18 @ 11:06]    Creatinine Trend:  SCr 6.09 [10-15 @ 09:04]  SCr 3.95 [10-14 @ 11:06]  SCr 3.46 [10-14 @ 04:07]  SCr 2.93 [10-14 @ 00:19]  SCr 2.50 [10-13 @ 20:04]                  PTH -- (Ca 8.3)      [03-03-18 @ 08:53]   134  HbA1c 7.2      [07-03-18 @ 05:01]  TSH 1.07      [12-19-17 @ 06:12]  Lipid: chol 113, TG 86, HDL 59, LDL 37      [04-30-18 @ 06:44]

## 2018-10-16 LAB
ANION GAP SERPL CALC-SCNC: 20 MMOL/L — HIGH (ref 5–17)
BUN SERPL-MCNC: 36 MG/DL — HIGH (ref 7–23)
CALCIUM SERPL-MCNC: 7.5 MG/DL — LOW (ref 8.4–10.5)
CHLORIDE SERPL-SCNC: 93 MMOL/L — LOW (ref 96–108)
CO2 SERPL-SCNC: 23 MMOL/L — SIGNIFICANT CHANGE UP (ref 22–31)
CREAT SERPL-MCNC: 7.27 MG/DL — HIGH (ref 0.5–1.3)
GLUCOSE BLDC GLUCOMTR-MCNC: 110 MG/DL — HIGH (ref 70–99)
GLUCOSE BLDC GLUCOMTR-MCNC: 143 MG/DL — HIGH (ref 70–99)
GLUCOSE BLDC GLUCOMTR-MCNC: 168 MG/DL — HIGH (ref 70–99)
GLUCOSE BLDC GLUCOMTR-MCNC: 314 MG/DL — HIGH (ref 70–99)
GLUCOSE BLDC GLUCOMTR-MCNC: 86 MG/DL — SIGNIFICANT CHANGE UP (ref 70–99)
GLUCOSE BLDC GLUCOMTR-MCNC: 89 MG/DL — SIGNIFICANT CHANGE UP (ref 70–99)
GLUCOSE BLDC GLUCOMTR-MCNC: 98 MG/DL — SIGNIFICANT CHANGE UP (ref 70–99)
GLUCOSE SERPL-MCNC: 110 MG/DL — HIGH (ref 70–99)
HBA1C BLD-MCNC: 9.1 % — HIGH (ref 4–5.6)
HCT VFR BLD CALC: 27.4 % — LOW (ref 34.5–45)
HGB BLD-MCNC: 8.7 G/DL — LOW (ref 11.5–15.5)
MCHC RBC-ENTMCNC: 31.8 GM/DL — LOW (ref 32–36)
MCHC RBC-ENTMCNC: 32.3 PG — SIGNIFICANT CHANGE UP (ref 27–34)
MCV RBC AUTO: 101.9 FL — HIGH (ref 80–100)
PLATELET # BLD AUTO: 220 K/UL — SIGNIFICANT CHANGE UP (ref 150–400)
POTASSIUM SERPL-MCNC: 4.5 MMOL/L — SIGNIFICANT CHANGE UP (ref 3.5–5.3)
POTASSIUM SERPL-SCNC: 4.5 MMOL/L — SIGNIFICANT CHANGE UP (ref 3.5–5.3)
RBC # BLD: 2.69 M/UL — LOW (ref 3.8–5.2)
RBC # FLD: 14.8 % — HIGH (ref 10.3–14.5)
SODIUM SERPL-SCNC: 136 MMOL/L — SIGNIFICANT CHANGE UP (ref 135–145)
WBC # BLD: 4.56 K/UL — SIGNIFICANT CHANGE UP (ref 3.8–10.5)
WBC # FLD AUTO: 4.56 K/UL — SIGNIFICANT CHANGE UP (ref 3.8–10.5)

## 2018-10-16 PROCEDURE — 99233 SBSQ HOSP IP/OBS HIGH 50: CPT

## 2018-10-16 PROCEDURE — 12345: CPT | Mod: NC

## 2018-10-16 RX ORDER — OXYCODONE AND ACETAMINOPHEN 5; 325 MG/1; MG/1
1 TABLET ORAL ONCE
Qty: 0 | Refills: 0 | Status: DISCONTINUED | OUTPATIENT
Start: 2018-10-16 | End: 2018-10-17

## 2018-10-16 RX ADMIN — Medication 81 MILLIGRAM(S): at 14:22

## 2018-10-16 RX ADMIN — Medication 50 MILLIGRAM(S): at 14:22

## 2018-10-16 RX ADMIN — CLOPIDOGREL BISULFATE 75 MILLIGRAM(S): 75 TABLET, FILM COATED ORAL at 14:22

## 2018-10-16 RX ADMIN — Medication 50 MILLIGRAM(S): at 22:00

## 2018-10-16 RX ADMIN — ATORVASTATIN CALCIUM 20 MILLIGRAM(S): 80 TABLET, FILM COATED ORAL at 22:00

## 2018-10-16 NOTE — PROGRESS NOTE ADULT - PROBLEM SELECTOR PLAN 2
-She will be due for site change on 10/17  -Appreciate RD/CDE Anabel Isabel for reviewing how to bolus with patient earlier today    Paris Colbert MD  [phone]

## 2018-10-16 NOTE — PROGRESS NOTE ADULT - SUBJECTIVE AND OBJECTIVE BOX
Patient is a 63y old  Female who presents with a chief complaint of Hyperglycemic Hyperosmolar Syndrome Vs. DKA (16 Oct 2018 11:39)      SUBJECTIVE / OVERNIGHT EVENTS: Comfortable without new complaints.   Review of Systems  chest pain no  palpitations no  sob no  nausea no  headache no    MEDICATIONS  (STANDING):  aspirin  chewable 81 milliGRAM(s) Oral daily  atorvastatin 20 milliGRAM(s) Oral at bedtime  clopidogrel Tablet 75 milliGRAM(s) Oral daily  dextrose 5%. 1000 milliLiter(s) (50 mL/Hr) IV Continuous <Continuous>  dextrose 50% Injectable 12.5 Gram(s) IV Push once  dextrose 50% Injectable 25 Gram(s) IV Push once  dextrose 50% Injectable 25 Gram(s) IV Push once  heparin  Injectable 5000 Unit(s) SubCutaneous every 8 hours  hydrALAZINE 50 milliGRAM(s) Oral every 8 hours  insulin lispro (HumaLOG) Pump 1 Each SubCutaneous Continuous Pump    MEDICATIONS  (PRN):  dextrose 40% Gel 15 Gram(s) Oral once PRN Blood Glucose LESS THAN 70 milliGRAM(s)/deciLiter  glucagon  Injectable 1 milliGRAM(s) IntraMuscular once PRN Glucose <70 milliGRAM(s)/deciLiter      Vital Signs Last 24 Hrs  T(C): 36.5 (16 Oct 2018 13:10), Max: 36.8 (16 Oct 2018 01:04)  T(F): 97.7 (16 Oct 2018 13:10), Max: 98.3 (16 Oct 2018 08:32)  HR: 94 (16 Oct 2018 14:19) (68 - 94)  BP: 186/77 (16 Oct 2018 14:19) (125/66 - 186/77)  BP(mean): --  RR: 18 (16 Oct 2018 09:30) (18 - 18)  SpO2: 99% (16 Oct 2018 09:30) (96% - 100%)    PHYSICAL EXAM:  GENERAL: NAD, well-developed  HEAD:  Atraumatic, Normocephalic  EYES: EOMI, PERRLA, conjunctiva and sclera clear  NECK: Supple, No JVD  CHEST/LUNG: Clear to auscultation bilaterally; No wheeze  HEART: Regular rate and rhythm; No murmurs, rubs, or gallops  ABDOMEN: Soft, Nontender, Nondistended; Bowel sounds present  EXTREMITIES:  2+ Peripheral Pulses, No clubbing, cyanosis, or edema  PSYCH: AAOx3  NEUROLOGY: non-focal  SKIN: No rashes or lesions    LABS:                        8.7    4.56  )-----------( 220      ( 16 Oct 2018 08:13 )             27.4     10-16    136  |  93<L>  |  36<H>  ----------------------------<  110<H>  4.5   |  23  |  7.27<H>    Ca    7.5<L>      16 Oct 2018 07:04  Phos  4.9     10-15  Mg     2.2     10-15    TPro  6.7  /  Alb  3.8  /  TBili  0.4  /  DBili  x   /  AST  14  /  ALT  9<L>  /  AlkPhos  115  10-15    PT/INR - ( 15 Oct 2018 11:31 )   PT: 10.6 sec;   INR: 0.94 ratio         PTT - ( 15 Oct 2018 11:31 )  PTT:27.9 sec            RADIOLOGY & ADDITIONAL TESTS:    Imaging Personally Reviewed:    Consultant(s) Notes Reviewed:      Care Discussed with Consultants/Other Providers:

## 2018-10-16 NOTE — PROGRESS NOTE ADULT - ASSESSMENT
76 f with  DKA resolved- BS control, insulin pump restarted. Endocrine follow  CAD- stable  CHF- stable  PVD- no intervention now  ESRD- HD Nephrology follow Dr. Schmidt   DCP home   Pierce Viera MD pager [phone]

## 2018-10-16 NOTE — PROGRESS NOTE ADULT - PROBLEM SELECTOR PLAN 1
-continue current settings. Patient will likely be high at dinner as I found her with fried onion rings and sun chips hidden in her bed. She ate nearly the entire bag of both. She bought them from the  cart on her unit. Of course she did not bolus for them  -F/u with endo Dr. Pina Ley, [facility]. Pt last saw her one month ago. Patient is stable for discharge from my standpoint. Primary attending will discharge her in the  morning.

## 2018-10-16 NOTE — PROGRESS NOTE ADULT - SUBJECTIVE AND OBJECTIVE BOX
[facility]   [phone]  DIALYSIS NOTE  Chief Complaint: ESRD/Ongoing hemodialysis requirement. seen on hd    24 hour events/subjective:      no worsening sob       ALLERGIES & MEDICATIONS  --------------------------------------------------------------------------------  Allergies    No Known Allergies    Intolerances      Standing Inpatient Medications  aspirin  chewable 81 milliGRAM(s) Oral daily  atorvastatin 20 milliGRAM(s) Oral at bedtime  clopidogrel Tablet 75 milliGRAM(s) Oral daily  dextrose 5%. 1000 milliLiter(s) IV Continuous <Continuous>  dextrose 50% Injectable 12.5 Gram(s) IV Push once  dextrose 50% Injectable 25 Gram(s) IV Push once  dextrose 50% Injectable 25 Gram(s) IV Push once  heparin  Injectable 5000 Unit(s) SubCutaneous every 8 hours  hydrALAZINE 50 milliGRAM(s) Oral every 8 hours  insulin lispro (HumaLOG) Pump 1 Each SubCutaneous Continuous Pump    PRN Inpatient Medications  dextrose 40% Gel 15 Gram(s) Oral once PRN  glucagon  Injectable 1 milliGRAM(s) IntraMuscular once PRN      REVIEW OF SYSTEMS  --------------------------------------------------------------------------------  no itching or rash  no fever or chill  no cp or palp   no sob or cough   no N/V/D/ no abd pain   ext no edema          VITALS/PHYSICAL EXAM  --------------------------------------------------------------------------------  T(C): 36.7 (10-16-18 @ 09:30), Max: 36.8 (10-16-18 @ 01:04)  HR: 74 (10-16-18 @ 09:30) (74 - 77)  BP: 149/70 (10-16-18 @ 09:30) (125/63 - 164/89)  RR: 18 (10-16-18 @ 09:30) (18 - 18)  SpO2: 99% (10-16-18 @ 09:30) (96% - 100%)  Wt(kg): --        10-15-18 @ 07:01  -  10-16-18 @ 07:00  --------------------------------------------------------  IN: 940 mL / OUT: 0 mL / NET: 940 mL      Physical Exam:  		    	Gen: alert oriented place person and date   	Pulm: Decreased breath sounds b/l bases no rales or ronchi  	CV: RRR, S1/S2  	Abd: +BS, soft, nontender/nondistended  	Extremity: No cyanosis, RLE no edema LLE 2+  edema no clubbing    	    LABS/STUDIES  --------------------------------------------------------------------------------              8.7    4.56  >-----------<  220      [10-16-18 @ 08:13]              27.4     136  |  93  |  36  ----------------------------<  110      [10-16-18 @ 07:04]  4.5   |  23  |  7.27        Ca     7.5     [10-16-18 @ 07:04]      Mg     2.2     [10-15-18 @ 09:04]      Phos  4.9     [10-15-18 @ 09:04]    TPro  6.7  /  Alb  3.8  /  TBili  0.4  /  DBili  x   /  AST  14  /  ALT  9   /  AlkPhos  115  [10-15-18 @ 09:04]    PT/INR: PT 10.6 , INR 0.94       [10-15-18 @ 11:31]  PTT: 27.9       [10-15-18 @ 11:31]              imp/suggest: ESRD      Hemodialysis Prescription:  	Access: avf  	Dialyzer: revaclear 300  	Blood Flow (mL/Min): 400  	Dialysate Flow (mL/Min): 600  	Target UF (Liters): 2.5 liter   	Treatment Time: 3.58 h  	Potassium:  2k   	Calcium: 2.5  	  YOLANDA    Vitamin D     continue with hd   see hd flow sheet

## 2018-10-16 NOTE — PROGRESS NOTE ADULT - SUBJECTIVE AND OBJECTIVE BOX
Follow-up for diabetes/insulin pump managament      S: Patient with better glycemic control but her access to the chips/candy for sale on her unit is leading her to dietary indiscretions. I found opened bag of fried onions and sun chips in her bed. I spoke with the  who noted that she bought some chocolate cake as well.     Date of last site change: 10/14/17  Date of next site change: 10/17/17  Pump interruptions: None      MEDICATIONS  (STANDING):  aspirin  chewable 81 milliGRAM(s) Oral daily  atorvastatin 20 milliGRAM(s) Oral at bedtime  clopidogrel Tablet 75 milliGRAM(s) Oral daily  dextrose 5%. 1000 milliLiter(s) (50 mL/Hr) IV Continuous <Continuous>  dextrose 50% Injectable 12.5 Gram(s) IV Push once  dextrose 50% Injectable 25 Gram(s) IV Push once  dextrose 50% Injectable 25 Gram(s) IV Push once  heparin  Injectable 5000 Unit(s) SubCutaneous every 8 hours  hydrALAZINE 50 milliGRAM(s) Oral every 8 hours  insulin lispro (HumaLOG) Pump 1 Each SubCutaneous Continuous Pump    MEDICATIONS  (PRN):  dextrose 40% Gel 15 Gram(s) Oral once PRN Blood Glucose LESS THAN 70 milliGRAM(s)/deciLiter  glucagon  Injectable 1 milliGRAM(s) IntraMuscular once PRN Glucose <70 milliGRAM(s)/deciLiter      O: T(C): 36.7 (10-16-18 @ 15:45), Max: 36.8 (10-16-18 @ 01:04)  HR: 97 (10-16-18 @ 15:45) (68 - 97)  BP: 154/71 (10-16-18 @ 15:45) (125/66 - 186/77)  RR: 18 (10-16-18 @ 15:45) (18 - 18)  SpO2: 100% (10-16-18 @ 15:45) (96% - 100%)  GEN: NAD  CVS: S1, S2, RRR, no murmurs  Resp: CTA B/L  Abd: soft, NT/ND, +BS  Skin: catheter in R side of abdomen, no erythema/exudate      POCT Blood Glucose.: 314 mg/dL (10-16-18 @ 16:44)  POCT Blood Glucose.: 143 mg/dL (10-16-18 @ 14:12)  POCT Blood Glucose.: 168 mg/dL (10-16-18 @ 08:23)  POCT Blood Glucose.: 110 mg/dL (10-16-18 @ 05:41)  POCT Blood Glucose.: 89 mg/dL (10-16-18 @ 01:03)  POCT Blood Glucose.: 86 mg/dL (10-16-18 @ 01:02)  POCT Blood Glucose.: 269 mg/dL (10-15-18 @ 21:19)  POCT Blood Glucose.: 223 mg/dL (10-15-18 @ 17:22)  POCT Blood Glucose.: 255 mg/dL (10-15-18 @ 12:01)  POCT Blood Glucose.: 354 mg/dL (10-15-18 @ 08:27)  POCT Blood Glucose.: 385 mg/dL (10-15-18 @ 05:24)  POCT Blood Glucose.: 291 mg/dL (10-15-18 @ 02:24)  POCT Blood Glucose.: 141 mg/dL (10-14-18 @ 21:23)  POCT Blood Glucose.: 230 mg/dL (10-14-18 @ 16:53)  POCT Blood Glucose.: 180 mg/dL (10-14-18 @ 13:17)  POCT Blood Glucose.: 174 mg/dL (10-14-18 @ 12:19)  POCT Blood Glucose.: 154 mg/dL (10-14-18 @ 11:00)  POCT Blood Glucose.: 129 mg/dL (10-14-18 @ 09:59)  POCT Blood Glucose.: 124 mg/dL (10-14-18 @ 09:13)  POCT Blood Glucose.: 123 mg/dL (10-14-18 @ 08:12)  POCT Blood Glucose.: 109 mg/dL (10-14-18 @ 06:56)  POCT Blood Glucose.: 101 mg/dL (10-14-18 @ 05:52)  POCT Blood Glucose.: 110 mg/dL (10-14-18 @ 04:59)  POCT Blood Glucose.: 170 mg/dL (10-14-18 @ 04:02)  POCT Blood Glucose.: 228 mg/dL (10-14-18 @ 03:01)  POCT Blood Glucose.: 318 mg/dL (10-14-18 @ 02:05)  POCT Blood Glucose.: 312 mg/dL (10-14-18 @ 01:00)  POCT Blood Glucose.: 235 mg/dL (10-14-18 @ 00:04)  POCT Blood Glucose.: 188 mg/dL (10-13-18 @ 22:55)  POCT Blood Glucose.: 199 mg/dL (10-13-18 @ 22:01)  POCT Blood Glucose.: 141 mg/dL (10-13-18 @ 21:06)      10-16    136  |  93<L>  |  36<H>  ----------------------------<  110<H>  4.5   |  23  |  7.27<H>    EGFR if : 6<L>  EGFR if non : 6<L>    Ca    7.5<L>      10-16  Mg     2.2     10-15  Phos  4.9     10-15    TPro  6.7  /  Alb  3.8  /  TBili  0.4  /  DBili  x   /  AST  14  /  ALT  9<L>  /  AlkPhos  115  10-15      Hemoglobin A1C, Whole Blood: 9.1 % <H> [4.0 - 5.6] (10-16-18 @ 08:13)

## 2018-10-17 ENCOUNTER — TRANSCRIPTION ENCOUNTER (OUTPATIENT)
Age: 63
End: 2018-10-17

## 2018-10-17 VITALS — DIASTOLIC BLOOD PRESSURE: 73 MMHG | SYSTOLIC BLOOD PRESSURE: 151 MMHG | HEART RATE: 89 BPM

## 2018-10-17 DIAGNOSIS — Z96.41 PRESENCE OF INSULIN PUMP (EXTERNAL) (INTERNAL): ICD-10-CM

## 2018-10-17 LAB
ANION GAP SERPL CALC-SCNC: 13 MMOL/L — SIGNIFICANT CHANGE UP (ref 5–17)
BUN SERPL-MCNC: 20 MG/DL — SIGNIFICANT CHANGE UP (ref 7–23)
CALCIUM SERPL-MCNC: 7.7 MG/DL — LOW (ref 8.4–10.5)
CHLORIDE SERPL-SCNC: 97 MMOL/L — SIGNIFICANT CHANGE UP (ref 96–108)
CO2 SERPL-SCNC: 27 MMOL/L — SIGNIFICANT CHANGE UP (ref 22–31)
CREAT SERPL-MCNC: 4.7 MG/DL — HIGH (ref 0.5–1.3)
GLUCOSE BLDC GLUCOMTR-MCNC: 157 MG/DL — HIGH (ref 70–99)
GLUCOSE BLDC GLUCOMTR-MCNC: 192 MG/DL — HIGH (ref 70–99)
GLUCOSE BLDC GLUCOMTR-MCNC: 246 MG/DL — HIGH (ref 70–99)
GLUCOSE BLDC GLUCOMTR-MCNC: 95 MG/DL — SIGNIFICANT CHANGE UP (ref 70–99)
GLUCOSE SERPL-MCNC: 159 MG/DL — HIGH (ref 70–99)
HCT VFR BLD CALC: 26.9 % — LOW (ref 34.5–45)
HGB BLD-MCNC: 8.7 G/DL — LOW (ref 11.5–15.5)
MCHC RBC-ENTMCNC: 32.3 GM/DL — SIGNIFICANT CHANGE UP (ref 32–36)
MCHC RBC-ENTMCNC: 32.6 PG — SIGNIFICANT CHANGE UP (ref 27–34)
MCV RBC AUTO: 100.7 FL — HIGH (ref 80–100)
PLATELET # BLD AUTO: 209 K/UL — SIGNIFICANT CHANGE UP (ref 150–400)
POTASSIUM SERPL-MCNC: 4.3 MMOL/L — SIGNIFICANT CHANGE UP (ref 3.5–5.3)
POTASSIUM SERPL-SCNC: 4.3 MMOL/L — SIGNIFICANT CHANGE UP (ref 3.5–5.3)
RBC # BLD: 2.67 M/UL — LOW (ref 3.8–5.2)
RBC # FLD: 15.1 % — HIGH (ref 10.3–14.5)
SODIUM SERPL-SCNC: 137 MMOL/L — SIGNIFICANT CHANGE UP (ref 135–145)
WBC # BLD: 3.08 K/UL — LOW (ref 3.8–10.5)
WBC # FLD AUTO: 3.08 K/UL — LOW (ref 3.8–10.5)

## 2018-10-17 PROCEDURE — 99232 SBSQ HOSP IP/OBS MODERATE 35: CPT

## 2018-10-17 RX ORDER — LISINOPRIL 2.5 MG/1
1 TABLET ORAL
Qty: 0 | Refills: 0 | COMMUNITY

## 2018-10-17 RX ORDER — METOPROLOL TARTRATE 50 MG
25 TABLET ORAL
Qty: 0 | Refills: 0 | Status: DISCONTINUED | OUTPATIENT
Start: 2018-10-17 | End: 2018-10-17

## 2018-10-17 RX ORDER — LISINOPRIL 2.5 MG/1
40 TABLET ORAL DAILY
Qty: 0 | Refills: 0 | Status: DISCONTINUED | OUTPATIENT
Start: 2018-10-17 | End: 2018-10-17

## 2018-10-17 RX ADMIN — Medication 50 MILLIGRAM(S): at 13:01

## 2018-10-17 RX ADMIN — OXYCODONE AND ACETAMINOPHEN 1 TABLET(S): 5; 325 TABLET ORAL at 01:00

## 2018-10-17 RX ADMIN — OXYCODONE AND ACETAMINOPHEN 1 TABLET(S): 5; 325 TABLET ORAL at 00:22

## 2018-10-17 RX ADMIN — Medication 50 MILLIGRAM(S): at 05:56

## 2018-10-17 RX ADMIN — Medication 81 MILLIGRAM(S): at 12:57

## 2018-10-17 RX ADMIN — CLOPIDOGREL BISULFATE 75 MILLIGRAM(S): 75 TABLET, FILM COATED ORAL at 12:57

## 2018-10-17 NOTE — DISCHARGE NOTE ADULT - SECONDARY DIAGNOSIS.
CHF (congestive heart failure) ESRD (end stage renal disease) Hyperlipidemia Hypertension, unspecified type Diabetes mellitus type 1

## 2018-10-17 NOTE — DISCHARGE NOTE ADULT - HOSPITAL COURSE
63 yr old female with PMHx DM type 1 on insulin pump, frequent admissions for DKA , last hospitalized July 2018 in which she required balloon fistuloplasty of her Lt AVF 2/2 to stenosis. ESRD on H.D. M/T/TH/Sat (last H.D. this past Thursday 10/11/18 with removal of 2.5 liters), CAD s/p stents, HFrEF 52%, Mod Ao stenosis, HLD, CVA, PVD who now presents to E.D. from home after developing nausea/vomiting last and observing POC glucose of 600.  Admitted to MICU for HHS vs DKA. Pt clinically improved on insulin gtt, gap closed, FS wnl, asymptomatic. Blood glucose now improved.    DKA resolved- BS control, insulin pump restarted. Endocrine follow  CAD- stable  CHF- stable  PVD- no intervention now  ESRD- contimue HS per Nephrology

## 2018-10-17 NOTE — DISCHARGE NOTE ADULT - PLAN OF CARE
Resolved Follow-up with your Endocrinologist Dr. Pina Ley for further monitoring and treatment. Weigh yourself daily.  If you gain 3lbs in 3 days, or 5lbs in a week call your Health Care Provider.  Do not eat or drink foods containing more than 2000mg of salt (sodium) in your diet every day.  Call your Health Care Provider if you have any swelling or increased swelling in your feet, ankles, and/or stomach.  Take all of your medication as directed.  If you become dizzy call your Health Care Provider. HgA1C this admission.  Make sure you get your HgA1c checked every three months.  If you take oral diabetes medications, check your blood glucose two times a day.  If you take insulin, check your blood glucose before meals and at bedtime.  It's important not to skip any meals.  Keep a log of your blood glucose results and always take it with you to your doctor appointments.  Keep a list of your current medications including injectables and over the counter medications and bring this medication list with you to all your doctor appointments.  If you have not seen your ophthalmologist this year call for appointment.  Check your feet daily for redness, sores, or openings. Do not self treat. If no improvement in two days call your primary care physician for an appointment.  Low blood sugar (hypoglycemia) is a blood sugar below 70mg/dl. Check your blood sugar if you feel signs/symptoms of hypoglycemia. If your blood sugar is below 70 take 15 grams of carbohydrates (ex 4 oz of apple juice, 3-4 glucose tablets, or 4-6 oz of regular soda) wait 15 minutes and repeat blood sugar to make sure it comes up above 70.  If your blood sugar is above 70 and you are due for a meal, have a meal.  If you are not due for a meal have a snack.  This snack helps keeps your blood sugar at a safe range. Follow up with PCP for treatment goals, continue medication, have liver function testing every 3 months as anti lipid medications can cause liver irritation, eat low fat, low cholesterol meals Low salt diet  Activity as tolerated.  Take all medication as prescribed.  Follow up with your medical doctor for routine blood pressure monitoring at your next visit.  Notify your doctor if you have any of the following symptoms:   Dizziness, Lightheadedness, Blurry vision, Headache, Chest pain, Shortness of breath

## 2018-10-17 NOTE — PROGRESS NOTE ADULT - SUBJECTIVE AND OBJECTIVE BOX
Diabetes Follow up note:  Interval Hx:  63 year old male w/uncontrolled  c/b ESRD on HD, CAD, TIA, PVD, neuropathy here with nausea/vomiting found to be in DKA s/p transition from insulin gtt to insulin pump on 10/15/18. BG values mostly 100s today, set for discharge home. Pt reports feeling much better since hospitalization. Eating snack early this AM prior to FS testing but with improved glucose value at lunchtime today.     Review of Systems:  General: "I'm ready to go home"  GI: Tolerating POs without any N/V/D/ABD PAIN.  CV: No CP/SOB  ENDO: No S&Sx of hypoglycemia  MEDS:  atorvastatin 20 milliGRAM(s) Oral at bedtime    insulin lispro (HumaLOG) Pump 1 Each SubCutaneous Continuous Pump  Basal Pump settings:  12am: 0.275 units/hr  5am: 0.375 units/hr  I:C Ratio: 15  ISF: 12am 60 7am 40 6pm 60  BG target:  12am: 110-130mg/dl  8am: 100-120mg/dl      Allergies    No Known Allergies      PE:  General: Female sitting in bed. NAD.   Vital Signs Last 24 Hrs  T(C): 36.5 (17 Oct 2018 09:00), Max: 36.7 (16 Oct 2018 15:45)  T(F): 97.7 (17 Oct 2018 09:00), Max: 98.1 (16 Oct 2018 21:56)  HR: 96 (17 Oct 2018 12:52) (66 - 97)  BP: 179/92 (17 Oct 2018 12:52) (143/71 - 186/77)  BP(mean): --  RR: 18 (17 Oct 2018 09:00) (18 - 18)  SpO2: 95% (17 Oct 2018 09:00) (95% - 100%)  Abd: Soft, NT,ND, R abdomen Pump site intact.   Extremities: Warm. no edema.   Neuro: A&O X3    LABS:    POCT Blood Glucose.: 192 mg/dL (10-17-18 @ 12:27)  POCT Blood Glucose.: 246 mg/dL (10-17-18 @ 09:08)  POCT Blood Glucose.: 157 mg/dL (10-17-18 @ 05:54)  POCT Blood Glucose.: 95 mg/dL (10-17-18 @ 01:58)  POCT Blood Glucose.: 98 mg/dL (10-16-18 @ 21:50)  POCT Blood Glucose.: 314 mg/dL (10-16-18 @ 16:44)  POCT Blood Glucose.: 143 mg/dL (10-16-18 @ 14:12)  POCT Blood Glucose.: 168 mg/dL (10-16-18 @ 08:23)  POCT Blood Glucose.: 110 mg/dL (10-16-18 @ 05:41)  POCT Blood Glucose.: 89 mg/dL (10-16-18 @ 01:03)  POCT Blood Glucose.: 86 mg/dL (10-16-18 @ 01:02)  POCT Blood Glucose.: 269 mg/dL (10-15-18 @ 21:19)  POCT Blood Glucose.: 223 mg/dL (10-15-18 @ 17:22)  POCT Blood Glucose.: 255 mg/dL (10-15-18 @ 12:01)  POCT Blood Glucose.: 354 mg/dL (10-15-18 @ 08:27)  POCT Blood Glucose.: 385 mg/dL (10-15-18 @ 05:24)  POCT Blood Glucose.: 291 mg/dL (10-15-18 @ 02:24)  POCT Blood Glucose.: 141 mg/dL (10-14-18 @ 21:23)  POCT Blood Glucose.: 230 mg/dL (10-14-18 @ 16:53)                            8.7    3.08  )-----------( 209      ( 17 Oct 2018 09:05 )             26.9       10-17    137  |  97  |  20  ----------------------------<  159<H>  4.3   |  27  |  4.70<H>    Ca    7.7<L>      17 Oct 2018 07:29            Hemoglobin A1C, Whole Blood: 9.1 % <H> [4.0 - 5.6] (10-16-18 @ 08:13)            Contact number: isabel [phone] or [phone]

## 2018-10-17 NOTE — DISCHARGE NOTE ADULT - ADDITIONAL INSTRUCTIONS
Follow-up with your Endocrinologist Dr. Pina Ley for further monitoring and treatment.  Follow-up with your primary care provider ---call for appointment

## 2018-10-17 NOTE — PROGRESS NOTE ADULT - ASSESSMENT
76 f with  DKA resolved- BS control, insulin pump restarted. Endocrine follow  CAD- stable  CHF- stable  PVD- no intervention now  ESRD- HD Nephrology follow Dr. Schmidt   DC home. Close follow with PMD/ Cardiology/Nephrology/ Endocrine. QA   Pierce Viera MD pager [phone]

## 2018-10-17 NOTE — DISCHARGE NOTE ADULT - MEDICATION SUMMARY - MEDICATIONS TO TAKE
I will START or STAY ON the medications listed below when I get home from the hospital:    aspirin 81 mg oral delayed release tablet  -- 1 tab(s) by mouth once a day  -- Indication: For Cardio protective     insulin lispro 100 units/mL subcutaneous solution  -- as per  insulin pump setting(Insert pump at 8 PM tonight)  Humalog 3-4 units pre meals until then  -- Indication: For Diabetes mellitus type 1    atorvastatin 20 mg oral tablet  -- 1 tab(s) by mouth once a day (at bedtime)  -- Indication: For Hyperlipidemia    clopidogrel 75 mg oral tablet  -- 1 tab(s) by mouth once a day (at bedtime)  -- Indication: For Cardio protective     metoprolol tartrate 25 mg oral tablet  -- 1 tab(s) by mouth 2 times a day  -- Indication: For Hypertension, unspecified type    cyclopentolate 2% ophthalmic solution  -- 1 drop(s) to each affected eye once a day  -- Indication: For Glaucoma     ketorolac 0.5% ophthalmic solution  -- 1 drop(s) to each affected eye 3 times a day  -- Indication: For Glaucoma     prednisoLONE acetate 1% ophthalmic suspension  -- 1 drop(s) to each affected eye 3 times a day  -- Indication: For Glaucoma     neomycin/polymyxin B/dexamethasone 3.5 mg-10,000 units-1 mg/mL ophthalmic suspension  -- 1 drop(s) to each affected eye every 8 hours  -- Indication: For Glaucoma     hydrALAZINE 50 mg oral tablet  -- 1 tab(s) by mouth every 8 hours  -- Indication: For Hypertension, unspecified type

## 2018-10-17 NOTE — PROGRESS NOTE ADULT - PROBLEM SELECTOR PLAN 1
test BG AC/HS  -F/u with endo Dr. Pina Ley, [facility]  stable for discharge from Endocrine standpoint

## 2018-10-17 NOTE — DISCHARGE NOTE ADULT - PATIENT PORTAL LINK FT
You can access the [facility] Patient Portal, offered by [facility], by registering with the following website: [email or web address]

## 2018-10-17 NOTE — PROGRESS NOTE ADULT - SUBJECTIVE AND OBJECTIVE BOX
Patient is a 63y old  Female who presents with a chief complaint of Hyperglycemic Hyperosmolar Syndrome Vs. DKA (17 Oct 2018 09:15)      SUBJECTIVE / OVERNIGHT EVENTS: feels better. Wants to go home.  Review of Systems  chest pain no  palpitations no  sob no  nausea no  headache no    MEDICATIONS  (STANDING):  aspirin  chewable 81 milliGRAM(s) Oral daily  atorvastatin 20 milliGRAM(s) Oral at bedtime  clopidogrel Tablet 75 milliGRAM(s) Oral daily  dextrose 5%. 1000 milliLiter(s) (50 mL/Hr) IV Continuous <Continuous>  dextrose 50% Injectable 12.5 Gram(s) IV Push once  dextrose 50% Injectable 25 Gram(s) IV Push once  dextrose 50% Injectable 25 Gram(s) IV Push once  heparin  Injectable 5000 Unit(s) SubCutaneous every 8 hours  hydrALAZINE 50 milliGRAM(s) Oral every 8 hours  insulin lispro (HumaLOG) Pump 1 Each SubCutaneous Continuous Pump  metoprolol tartrate 25 milliGRAM(s) Oral two times a day    MEDICATIONS  (PRN):  dextrose 40% Gel 15 Gram(s) Oral once PRN Blood Glucose LESS THAN 70 milliGRAM(s)/deciLiter  glucagon  Injectable 1 milliGRAM(s) IntraMuscular once PRN Glucose <70 milliGRAM(s)/deciLiter      Vital Signs Last 24 Hrs  T(C): 36.5 (17 Oct 2018 09:00), Max: 36.7 (16 Oct 2018 15:45)  T(F): 97.7 (17 Oct 2018 09:00), Max: 98.1 (16 Oct 2018 21:56)  HR: 96 (17 Oct 2018 12:52) (66 - 97)  BP: 179/92 (17 Oct 2018 12:52) (143/71 - 186/77)  BP(mean): --  RR: 18 (17 Oct 2018 09:00) (18 - 18)  SpO2: 95% (17 Oct 2018 09:00) (95% - 100%)    PHYSICAL EXAM:  GENERAL: NAD  HEAD:  Atraumatic, Normocephalic  EYES: EOMI, PERRLA, conjunctiva and sclera clear  NECK: Supple, No JVD  CHEST/LUNG: Clear to auscultation bilaterally; No wheeze  HEART: Regular rate and rhythm; No murmurs, rubs, or gallops  ABDOMEN: Soft, Nontender, Nondistended; Bowel sounds present  EXTREMITIES:  2+ Peripheral Pulses, No clubbing, cyanosis, or edema  PSYCH: AAOx3  NEUROLOGY: non-focal  SKIN: No rashes or lesions    LABS:                        8.7    3.08  )-----------( 209      ( 17 Oct 2018 09:05 )             26.9     10-17    137  |  97  |  20  ----------------------------<  159<H>  4.3   |  27  |  4.70<H>    Ca    7.7<L>      17 Oct 2018 07:29                  RADIOLOGY & ADDITIONAL TESTS:    Imaging Personally Reviewed:    Consultant(s) Notes Reviewed:      Care Discussed with Consultants/Other Providers:

## 2018-10-17 NOTE — DISCHARGE NOTE ADULT - CARE PROVIDER_API CALL
Arslaan Castro), Internal Medicine  [address] [place] [address]  Phone: [phone]  Fax: [phone]    Daisy Burger (), Nephrology  [address] [address]  Phone: [phone]  Fax: [phone]

## 2018-10-17 NOTE — DISCHARGE NOTE ADULT - CARE PLAN
Principal Discharge DX:	DKA (diabetic ketoacidoses)  Goal:	Resolved  Assessment and plan of treatment:	Follow-up with your Endocrinologist Dr. Pina Ley for further monitoring and treatment.  Secondary Diagnosis:	CHF (congestive heart failure)  Assessment and plan of treatment:	Weigh yourself daily.  If you gain 3lbs in 3 days, or 5lbs in a week call your Health Care Provider.  Do not eat or drink foods containing more than 2000mg of salt (sodium) in your diet every day.  Call your Health Care Provider if you have any swelling or increased swelling in your feet, ankles, and/or stomach.  Take all of your medication as directed.  If you become dizzy call your Health Care Provider.  Secondary Diagnosis:	ESRD (end stage renal disease)  Assessment and plan of treatment:	HgA1C this admission.  Make sure you get your HgA1c checked every three months.  If you take oral diabetes medications, check your blood glucose two times a day.  If you take insulin, check your blood glucose before meals and at bedtime.  It's important not to skip any meals.  Keep a log of your blood glucose results and always take it with you to your doctor appointments.  Keep a list of your current medications including injectables and over the counter medications and bring this medication list with you to all your doctor appointments.  If you have not seen your ophthalmologist this year call for appointment.  Check your feet daily for redness, sores, or openings. Do not self treat. If no improvement in two days call your primary care physician for an appointment.  Low blood sugar (hypoglycemia) is a blood sugar below 70mg/dl. Check your blood sugar if you feel signs/symptoms of hypoglycemia. If your blood sugar is below 70 take 15 grams of carbohydrates (ex 4 oz of apple juice, 3-4 glucose tablets, or 4-6 oz of regular soda) wait 15 minutes and repeat blood sugar to make sure it comes up above 70.  If your blood sugar is above 70 and you are due for a meal, have a meal.  If you are not due for a meal have a snack.  This snack helps keeps your blood sugar at a safe range.  Secondary Diagnosis:	Hyperlipidemia  Assessment and plan of treatment:	Follow up with PCP for treatment goals, continue medication, have liver function testing every 3 months as anti lipid medications can cause liver irritation, eat low fat, low cholesterol meals  Secondary Diagnosis:	Hypertension, unspecified type  Assessment and plan of treatment:	Low salt diet  Activity as tolerated.  Take all medication as prescribed.  Follow up with your medical doctor for routine blood pressure monitoring at your next visit.  Notify your doctor if you have any of the following symptoms:   Dizziness, Lightheadedness, Blurry vision, Headache, Chest pain, Shortness of breath  Secondary Diagnosis:	Diabetes mellitus type 1  Assessment and plan of treatment:	Follow-up with your Endocrinologist Dr. Pina Ley for further monitoring and treatment.

## 2018-10-17 NOTE — PROGRESS NOTE ADULT - REASON FOR ADMISSION
Hyperglycemic Hyperosmolar Syndrome Vs. DKA

## 2018-10-17 NOTE — PROGRESS NOTE ADULT - PROBLEM SELECTOR PROBLEM 2
Insulin pump fitting or adjustment
Insulin pump fitting or adjustment
Insulin pump in place
Type 1 diabetes mellitus with ketoacidosis without coma
Insulin pump fitting or adjustment

## 2018-10-17 NOTE — PATIENT PROFILE ADULT. - BLOOD TRANSFUSION, PREVIOUS, PROFILE
[place] [facility]                                                        Office: [address] [address]                                                       Telephone: [phone]. Fax:[phone]                                                                             PROGRESS NOTE   Reason for follow up:  pre-operative cardiovascular risk evaluation and management                             Overnight: No new events.   Update: got left hip surgery.  tolrated surgery well. no cardiac complication.     Subjective: "  did not pass flatus   Complains of:    complain of  constipation. not had a bowel movement for 3 days.   Review of symptoms: Cardiac:  No chest pain. No dyspnea. No palpitations.  Respiratory: no cough. No dyspnea  Gastrointestinal: No diarrhea. No abdominal pain. No bleeding.  + Constipation     Past medical history: No updates.   Chronic conditions:  Hypertension: controlled.   	  Vitals:  T(C): 37.5 (17 Oct 2018 09:12), Max: 37.6 (17 Oct 2018 04:49)  T(F): 99.5 (17 Oct 2018 09:12), Max: 99.7 (17 Oct 2018 04:49)  HR: 98 (17 Oct 2018 09:12) (84 - 98)  BP: 134/82 (17 Oct 2018 09:12) (94/56 - 134/82)  BP(mean): --  RR: 18 (17 Oct 2018 09:12) (18 - 18)  SpO2: 99% (17 Oct 2018 09:12) (95% - 99%)        Weight (kg): 45.4 (10-14 @ 23:33)    PHYSICAL EXAM:  Appearance: Comfortable. No acute distress  HEENT:  Head and neck: Atraumatic. Normocephalic.  Normal oral mucosa, PERRL, Neck is supple. No JVD, No carotid bruit.   Neurologic: A & O x 3, no focal deficits. EOMI , Cranial nerves are intact.  Lymphatic: No cervical lymphadenopathy  Cardiovascular: Normal S1 S2, No murmur, rubs/gallops. No JVD, No edema  Respiratory: Lungs clear to auscultation  Gastrointestinal:  Soft, Non-tender, + BS  Lower Extremities: No edema  Psychiatry: Patient is calm. No agitation. Mood & affect appropriate  Skin: No rashes/ ecchymoses/cyanosis/ulcers visualized on the face, hands or feet.    CURRENT MEDICATIONS:    ceFAZolin   IVPB  acetaminophen  IVPB ..  venlafaxine XR.  docusate sodium  pantoprazole    Tablet  ascorbic acid  ferrous    sulfate  folic acid  multivitamin  sodium chloride 0.9%.      LABS:	 	  CARDIAC MARKERS ( 15 Oct 2018 13:37                        9.6    9.3   )-----------( 272      ( 17 Oct 2018 06:59 )             29.7     10-17    137  |  103  |  15.0  ----------------------------<  107  3.7   |  23.0  |  0.45<L>    Ca    8.7      17 Oct 2018 06:59  Phos  3.4     10-15  Mg     1.9     10-16    TPro  7.9  /  Alb  3.9  /  TBili  0.4  /  DBili  x   /  AST  26  /  ALT  11  /  AlkPhos  77  10-15 yes

## 2018-10-17 NOTE — PROGRESS NOTE ADULT - PROBLEM SELECTOR PLAN 2
-c/w pump settings at home. Next site change today once home.   discussed w/pt and medicine NP  pager: [phone]

## 2018-10-22 NOTE — DIETITIAN INITIAL EVALUATION ADULT. - NS AS NUTRI DX KNOWLEDGE BELIEFS
non-verbal indicators present/pain present unable to give number
Limited adherence to nutrition - related recommendations

## 2018-10-25 NOTE — H&P ADULT - NSHPPOAPRESSUREULCER_GEN_ALL_CORE
10/25/2018      RE: Artemio Nino  [address] [facility] [address]       Radiation Oncology Consult  Patient comes in for initial consultation in the Radiation Oncology Department at the request of Dr. Zavala to determine eligibility for TBI prior to transplant.     History of Present Illness:  Artemio is a pleasant 21 year old male in no acute distress and is accompanied by his parents.  In February of this year, Artemio had joint pain and then a syncopal episode.  His hemoglobin was 10.7, platelet 82,000 and white count was 8,000 with 5% peripheral blasts. 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  He started chemotherapy per protocol  AALL 1131 2/15/18.  On day 8 his MRD was 5.2% blasts.  On 3/15/18 he had 31% blasts on bone marrow biopsy.  He then started on Inotuzumab.  Bone marrow biopsy on 6/28/18 showed just mildly increased blasts at 1.8%.  He then had high dose methotrexate then with the addition of ruxolitinib (JAKAFI). On 9/7 flow shoed 0.014% MRD.  He then had another round of induction with cytoxan, etoposide and Ruxolitinib.  Bone marrow biopsy on 10/15/18 was negative but flow showed MRD at 0.006%.  He states he tolerated all treatment well.  He was CNS negative throughout.     His brother will be his donor with tentative admission date of 10/28.        Previous Radiation:  None    Previous Chemotherapy:  As above per HPI.    Implantable Cardiac Device (ICD):  NONE    Medications:  Current Outpatient Prescriptions   Medication     ruxolitinib (JAKAFI) 25 MG TABS tablet CHEMO     sulfamethoxazole-trimethoprim (BACTRIM DS/SEPTRA DS) 800-160 MG per tablet     No current facility-administered medications for this visit.        Allergies:     Allergies   Allergen Reactions     No Known Drug Allergies        Past Medical History:  No past medical history on file.    Past Surgical History:  No past  surgical history on file.    Family History:  family history is not on file.        Social History:  Patient is single and lives in [place], [place].  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    Pain Assessment 0-10:  Patient rates pain at a 0.    Review of Symptoms:  Constitutional: Negative for fever, chills, weight loss and malaise/fatigue.   Overall patient feels well.  HENT: Negative for nosebleeds, congestion, sore throat and neck pain.   Respiratory: Negative for cough, hemoptysis, sputum production, shortness of breath and wheezing.   Cardiovascular: Negative for chest pain, palpitations, claudication, leg swelling and PND.   Gastrointestinal: Negative for heartburn, nausea, vomiting, abdominal pain, diarrhea, constipation and blood in stool.   Genitourinary: Negative for dysuria.   Musculoskeletal: Negative for myalgias and joint pain.   Skin: Negative for itching and rash.   Neurological: Negative for dizziness, tingling, weakness and headaches.   Endo/Heme/Allergies: Does not bruise/bleed easily.   Psychiatric/Behavioral: Negative for depression and suicidal ideas. The patient is not nervous/anxious.     Physical Exam:  GENERAL: Well-developed, well-nourished, globally oriented.   HEENT: Normocephalic, atraumatic. Oral cavity and oropharynx without mucosal lesions.   NECK: Supple, full range of motion, no palpable cervical or supraclavicular lymphadenopathy.   LUNGS: Clear to auscultation bilaterally.   CARDIOVASCULAR: Regular rate and rhythm without murmur.   ABDOMEN: Soft, nondistended, nontender, no hepatosplenomegaly.  EXTREMITIES: Without cyanosis, clubbing or edema. .   LYMPHATICS: No palpable supraclavicular, axillary, inguinal, femoral adenopathy.   NEUROLOGIC: Alert and oriented.  Gait normal.     Labs:  CBC RESULTS:   Recent Labs   Lab Test  10/22/18   1048   WBC  5.3   RBC  3.31*   HGB  9.5*   HCT  28.4*   MCV  86   MCH  28.7   MCHC  33.5   RDW  13.7   PLT  214       Pregnancy  status:  Male    All pertinent labs, scans, and test results have been reviewed.    EDUCATION:  Patient given verbal and written education on TBI side effects, purpose of treatment and what the radiation experience will be like.  Patient expressed understanding and asked appropriate questions.        Assessment/Plan: ALL  Patient currently undergoing work-up for sibling donor MA transplant per protocol 2015-29 which calls for TBI 1320 cGy plus 400 cGy testicular boost.    STAFF RADIATION ONCOLOGY    Patient  appears to be a suitable candidate for TBI prior to hematopoietic transplant per protocol .  Patient has no radiation history.     Conditioning for this protocol is myeloablative and includes chemotherapy and total body irradiation.   I would recommend our regimen of TBI which is  given in 8 treatments over 4 days at 165 cGy per fraction with at least 6 hours between fractions at a dose rate of 10-15 cGy/minute.    The total body will be treated with the  patient in a semi-recumbent position withcompensators.     Risks and benefits of TBI were discussed including the potential short term side effects but not limited to nausea, vomiting, mucositis, alopecia, and potential organ damage.  Also discussed potential long term side effects including cataracts, sterility, chronic organ dysfunction, neurological effects, hormone insufficiencies, and secondary malignancies.    Patient and family were given a chance to ask questions.  They seem to understand risks and benefits of radiation conditioning prior to transplant.  Informed consent was obtained.      Simulation and measurements were performed under my direction      Thank you for this referral.  If you have any questions or concerns please do not hesitate to contact me. Patient will be followed by BMT staff after transplant.    Ester Early MD    Department of Radiation Oncology  [facility]      CC  Patient Care  Team:  Viky Zavala MD as PCP - General (Pediatric Hematology-Oncology)  Betty [place] LICSW as  ( - Clinical)     no

## 2018-10-30 ENCOUNTER — FORM ENCOUNTER (OUTPATIENT)
Age: 63
End: 2018-10-30

## 2018-10-31 ENCOUNTER — OUTPATIENT (OUTPATIENT)
Dept: OUTPATIENT SERVICES | Facility: HOSPITAL | Age: 63
LOS: 1 days | End: 2018-10-31
Payer: MEDICARE

## 2018-10-31 ENCOUNTER — APPOINTMENT (OUTPATIENT)
Dept: CT IMAGING | Facility: IMAGING CENTER | Age: 63
End: 2018-10-31
Payer: MEDICARE

## 2018-10-31 DIAGNOSIS — Z98.89 OTHER SPECIFIED POSTPROCEDURAL STATES: Chronic | ICD-10-CM

## 2018-10-31 DIAGNOSIS — R91.8 OTHER NONSPECIFIC ABNORMAL FINDING OF LUNG FIELD: ICD-10-CM

## 2018-10-31 PROCEDURE — 71250 CT THORAX DX C-: CPT

## 2018-10-31 PROCEDURE — 71250 CT THORAX DX C-: CPT | Mod: 26

## 2018-11-11 PROCEDURE — 83880 ASSAY OF NATRIURETIC PEPTIDE: CPT

## 2018-11-11 PROCEDURE — 82947 ASSAY GLUCOSE BLOOD QUANT: CPT

## 2018-11-11 PROCEDURE — 80048 BASIC METABOLIC PNL TOTAL CA: CPT

## 2018-11-11 PROCEDURE — 84132 ASSAY OF SERUM POTASSIUM: CPT

## 2018-11-11 PROCEDURE — 93005 ELECTROCARDIOGRAM TRACING: CPT

## 2018-11-11 PROCEDURE — 99285 EMERGENCY DEPT VISIT HI MDM: CPT | Mod: 25

## 2018-11-11 PROCEDURE — 85730 THROMBOPLASTIN TIME PARTIAL: CPT

## 2018-11-11 PROCEDURE — 71045 X-RAY EXAM CHEST 1 VIEW: CPT

## 2018-11-11 PROCEDURE — 87633 RESP VIRUS 12-25 TARGETS: CPT

## 2018-11-11 PROCEDURE — 87581 M.PNEUMON DNA AMP PROBE: CPT

## 2018-11-11 PROCEDURE — 84100 ASSAY OF PHOSPHORUS: CPT

## 2018-11-11 PROCEDURE — 96374 THER/PROPH/DIAG INJ IV PUSH: CPT

## 2018-11-11 PROCEDURE — 82435 ASSAY OF BLOOD CHLORIDE: CPT

## 2018-11-11 PROCEDURE — 85014 HEMATOCRIT: CPT

## 2018-11-11 PROCEDURE — 85610 PROTHROMBIN TIME: CPT

## 2018-11-11 PROCEDURE — 83036 HEMOGLOBIN GLYCOSYLATED A1C: CPT

## 2018-11-11 PROCEDURE — 82330 ASSAY OF CALCIUM: CPT

## 2018-11-11 PROCEDURE — 82010 KETONE BODYS QUAN: CPT

## 2018-11-11 PROCEDURE — 80053 COMPREHEN METABOLIC PANEL: CPT

## 2018-11-11 PROCEDURE — 83605 ASSAY OF LACTIC ACID: CPT

## 2018-11-11 PROCEDURE — 82962 GLUCOSE BLOOD TEST: CPT

## 2018-11-11 PROCEDURE — 84295 ASSAY OF SERUM SODIUM: CPT

## 2018-11-11 PROCEDURE — 87798 DETECT AGENT NOS DNA AMP: CPT

## 2018-11-11 PROCEDURE — 82803 BLOOD GASES ANY COMBINATION: CPT

## 2018-11-11 PROCEDURE — 83735 ASSAY OF MAGNESIUM: CPT

## 2018-11-11 PROCEDURE — 93306 TTE W/DOPPLER COMPLETE: CPT

## 2018-11-11 PROCEDURE — 87486 CHLMYD PNEUM DNA AMP PROBE: CPT

## 2018-11-11 PROCEDURE — 84484 ASSAY OF TROPONIN QUANT: CPT

## 2018-11-11 PROCEDURE — 87040 BLOOD CULTURE FOR BACTERIA: CPT

## 2018-11-11 PROCEDURE — 99261: CPT

## 2018-11-11 PROCEDURE — 85027 COMPLETE CBC AUTOMATED: CPT

## 2018-11-11 PROCEDURE — 83930 ASSAY OF BLOOD OSMOLALITY: CPT

## 2018-11-16 ENCOUNTER — INPATIENT (INPATIENT)
Facility: HOSPITAL | Age: 63
LOS: 2 days | Discharge: HOME | DRG: 637 | End: 2018-11-19
Attending: STUDENT IN AN ORGANIZED HEALTH CARE EDUCATION/TRAINING PROGRAM | Admitting: STUDENT IN AN ORGANIZED HEALTH CARE EDUCATION/TRAINING PROGRAM
Payer: MEDICARE

## 2018-11-16 VITALS
DIASTOLIC BLOOD PRESSURE: 62 MMHG | HEART RATE: 80 BPM | RESPIRATION RATE: 18 BRPM | HEIGHT: 64 IN | SYSTOLIC BLOOD PRESSURE: 130 MMHG | WEIGHT: 117.95 LBS | OXYGEN SATURATION: 100 %

## 2018-11-16 DIAGNOSIS — E13.10 OTHER SPECIFIED DIABETES MELLITUS WITH KETOACIDOSIS WITHOUT COMA: ICD-10-CM

## 2018-11-16 DIAGNOSIS — Z98.89 OTHER SPECIFIED POSTPROCEDURAL STATES: Chronic | ICD-10-CM

## 2018-11-16 LAB
ALBUMIN SERPL ELPH-MCNC: 4.4 G/DL — SIGNIFICANT CHANGE UP (ref 3.3–5)
ALBUMIN SERPL ELPH-MCNC: 4.7 G/DL — SIGNIFICANT CHANGE UP (ref 3.3–5)
ALP SERPL-CCNC: 101 U/L — SIGNIFICANT CHANGE UP (ref 40–120)
ALP SERPL-CCNC: 106 U/L — SIGNIFICANT CHANGE UP (ref 40–120)
ALT FLD-CCNC: 15 U/L — SIGNIFICANT CHANGE UP (ref 10–45)
ALT FLD-CCNC: 7 U/L — LOW (ref 10–45)
ANION GAP SERPL CALC-SCNC: 29 MMOL/L — HIGH (ref 5–17)
ANION GAP SERPL CALC-SCNC: 33 MMOL/L — HIGH (ref 5–17)
APTT BLD: 29.7 SEC — SIGNIFICANT CHANGE UP (ref 27.5–36.3)
AST SERPL-CCNC: 20 U/L — SIGNIFICANT CHANGE UP (ref 10–40)
AST SERPL-CCNC: 32 U/L — SIGNIFICANT CHANGE UP (ref 10–40)
B-OH-BUTYR SERPL-SCNC: 8 MMOL/L — HIGH
BASE EXCESS BLDV CALC-SCNC: -7.6 MMOL/L — LOW (ref -2–2)
BASOPHILS # BLD AUTO: 0 K/UL — SIGNIFICANT CHANGE UP (ref 0–0.2)
BASOPHILS # BLD AUTO: 0 K/UL — SIGNIFICANT CHANGE UP (ref 0–0.2)
BASOPHILS NFR BLD AUTO: 0.1 % — SIGNIFICANT CHANGE UP (ref 0–2)
BASOPHILS NFR BLD AUTO: 0.4 % — SIGNIFICANT CHANGE UP (ref 0–2)
BILIRUB SERPL-MCNC: 0.4 MG/DL — SIGNIFICANT CHANGE UP (ref 0.2–1.2)
BILIRUB SERPL-MCNC: 0.5 MG/DL — SIGNIFICANT CHANGE UP (ref 0.2–1.2)
BUN SERPL-MCNC: 32 MG/DL — HIGH (ref 7–23)
BUN SERPL-MCNC: 38 MG/DL — HIGH (ref 7–23)
CA-I SERPL-SCNC: 1.05 MMOL/L — LOW (ref 1.12–1.3)
CALCIUM SERPL-MCNC: 8.7 MG/DL — SIGNIFICANT CHANGE UP (ref 8.4–10.5)
CALCIUM SERPL-MCNC: 9 MG/DL — SIGNIFICANT CHANGE UP (ref 8.4–10.5)
CHLORIDE BLDV-SCNC: 84 MMOL/L — LOW (ref 96–108)
CHLORIDE SERPL-SCNC: 79 MMOL/L — LOW (ref 96–108)
CHLORIDE SERPL-SCNC: 81 MMOL/L — LOW (ref 96–108)
CO2 BLDV-SCNC: 19 MMOL/L — LOW (ref 22–30)
CO2 SERPL-SCNC: 16 MMOL/L — LOW (ref 22–31)
CO2 SERPL-SCNC: 17 MMOL/L — LOW (ref 22–31)
CREAT SERPL-MCNC: 5.19 MG/DL — HIGH (ref 0.5–1.3)
CREAT SERPL-MCNC: 5.69 MG/DL — HIGH (ref 0.5–1.3)
EOSINOPHIL # BLD AUTO: 0 K/UL — SIGNIFICANT CHANGE UP (ref 0–0.5)
EOSINOPHIL # BLD AUTO: 0.1 K/UL — SIGNIFICANT CHANGE UP (ref 0–0.5)
EOSINOPHIL NFR BLD AUTO: 0.3 % — SIGNIFICANT CHANGE UP (ref 0–6)
EOSINOPHIL NFR BLD AUTO: 1.6 % — SIGNIFICANT CHANGE UP (ref 0–6)
GAS PNL BLDV: 123 MMOL/L — LOW (ref 136–145)
GAS PNL BLDV: SIGNIFICANT CHANGE UP
GAS PNL BLDV: SIGNIFICANT CHANGE UP
GLUCOSE BLDC GLUCOMTR-MCNC: 201 MG/DL — HIGH (ref 70–99)
GLUCOSE BLDC GLUCOMTR-MCNC: 225 MG/DL — HIGH (ref 70–99)
GLUCOSE BLDC GLUCOMTR-MCNC: 353 MG/DL — HIGH (ref 70–99)
GLUCOSE BLDC GLUCOMTR-MCNC: 470 MG/DL — CRITICAL HIGH (ref 70–99)
GLUCOSE BLDC GLUCOMTR-MCNC: 495 MG/DL — CRITICAL HIGH (ref 70–99)
GLUCOSE BLDC GLUCOMTR-MCNC: >600 MG/DL — CRITICAL HIGH (ref 70–99)
GLUCOSE BLDV-MCNC: 767 MG/DL — CRITICAL HIGH (ref 70–99)
GLUCOSE SERPL-MCNC: 812 MG/DL — CRITICAL HIGH (ref 70–99)
GLUCOSE SERPL-MCNC: 839 MG/DL — CRITICAL HIGH (ref 70–99)
HBA1C BLD-MCNC: 8.6 % — HIGH (ref 4–5.6)
HCO3 BLDV-SCNC: 18 MMOL/L — LOW (ref 21–29)
HCT VFR BLD CALC: 32.4 % — LOW (ref 34.5–45)
HCT VFR BLD CALC: 32.6 % — LOW (ref 34.5–45)
HCT VFR BLDA CALC: 32 % — LOW (ref 39–50)
HGB BLD CALC-MCNC: 10.2 G/DL — LOW (ref 11.5–15.5)
HGB BLD-MCNC: 10.7 G/DL — LOW (ref 11.5–15.5)
HGB BLD-MCNC: 10.7 G/DL — LOW (ref 11.5–15.5)
INR BLD: 0.94 RATIO — SIGNIFICANT CHANGE UP (ref 0.88–1.16)
LACTATE BLDV-MCNC: 2.4 MMOL/L — HIGH (ref 0.7–2)
LYMPHOCYTES # BLD AUTO: 0.9 K/UL — LOW (ref 1–3.3)
LYMPHOCYTES # BLD AUTO: 1 K/UL — SIGNIFICANT CHANGE UP (ref 1–3.3)
LYMPHOCYTES # BLD AUTO: 12.6 % — LOW (ref 13–44)
LYMPHOCYTES # BLD AUTO: 14.2 % — SIGNIFICANT CHANGE UP (ref 13–44)
MAGNESIUM SERPL-MCNC: 2.6 MG/DL — SIGNIFICANT CHANGE UP (ref 1.6–2.6)
MCHC RBC-ENTMCNC: 32.9 GM/DL — SIGNIFICANT CHANGE UP (ref 32–36)
MCHC RBC-ENTMCNC: 33 GM/DL — SIGNIFICANT CHANGE UP (ref 32–36)
MCHC RBC-ENTMCNC: 33.5 PG — SIGNIFICANT CHANGE UP (ref 27–34)
MCHC RBC-ENTMCNC: 34.1 PG — HIGH (ref 27–34)
MCV RBC AUTO: 102 FL — HIGH (ref 80–100)
MCV RBC AUTO: 103 FL — HIGH (ref 80–100)
MONOCYTES # BLD AUTO: 0.5 K/UL — SIGNIFICANT CHANGE UP (ref 0–0.9)
MONOCYTES # BLD AUTO: 0.5 K/UL — SIGNIFICANT CHANGE UP (ref 0–0.9)
MONOCYTES NFR BLD AUTO: 7 % — SIGNIFICANT CHANGE UP (ref 2–14)
MONOCYTES NFR BLD AUTO: 7.5 % — SIGNIFICANT CHANGE UP (ref 2–14)
NEUTROPHILS # BLD AUTO: 5.6 K/UL — SIGNIFICANT CHANGE UP (ref 1.8–7.4)
NEUTROPHILS # BLD AUTO: 5.7 K/UL — SIGNIFICANT CHANGE UP (ref 1.8–7.4)
NEUTROPHILS NFR BLD AUTO: 77.9 % — HIGH (ref 43–77)
NEUTROPHILS NFR BLD AUTO: 78.3 % — HIGH (ref 43–77)
NT-PROBNP SERPL-SCNC: HIGH PG/ML (ref 0–300)
PCO2 BLDV: 40 MMHG — SIGNIFICANT CHANGE UP (ref 35–50)
PH BLDV: 7.28 — LOW (ref 7.35–7.45)
PHOSPHATE SERPL-MCNC: 4 MG/DL — SIGNIFICANT CHANGE UP (ref 2.5–4.5)
PLATELET # BLD AUTO: 278 K/UL — SIGNIFICANT CHANGE UP (ref 150–400)
PLATELET # BLD AUTO: 286 K/UL — SIGNIFICANT CHANGE UP (ref 150–400)
PO2 BLDV: 51 MMHG — HIGH (ref 25–45)
POTASSIUM BLDV-SCNC: 6.4 MMOL/L — CRITICAL HIGH (ref 3.5–5.3)
POTASSIUM SERPL-MCNC: 5.3 MMOL/L — SIGNIFICANT CHANGE UP (ref 3.5–5.3)
POTASSIUM SERPL-MCNC: 6.4 MMOL/L — CRITICAL HIGH (ref 3.5–5.3)
POTASSIUM SERPL-SCNC: 5.3 MMOL/L — SIGNIFICANT CHANGE UP (ref 3.5–5.3)
POTASSIUM SERPL-SCNC: 6.4 MMOL/L — CRITICAL HIGH (ref 3.5–5.3)
PROT SERPL-MCNC: 7.1 G/DL — SIGNIFICANT CHANGE UP (ref 6–8.3)
PROT SERPL-MCNC: 7.4 G/DL — SIGNIFICANT CHANGE UP (ref 6–8.3)
PROTHROM AB SERPL-ACNC: 10.8 SEC — SIGNIFICANT CHANGE UP (ref 10–12.9)
RAPID RVP RESULT: SIGNIFICANT CHANGE UP
RBC # BLD: 3.14 M/UL — LOW (ref 3.8–5.2)
RBC # BLD: 3.2 M/UL — LOW (ref 3.8–5.2)
RBC # FLD: 13.5 % — SIGNIFICANT CHANGE UP (ref 10.3–14.5)
RBC # FLD: 13.9 % — SIGNIFICANT CHANGE UP (ref 10.3–14.5)
SAO2 % BLDV: 77 % — SIGNIFICANT CHANGE UP (ref 67–88)
SODIUM SERPL-SCNC: 127 MMOL/L — LOW (ref 135–145)
SODIUM SERPL-SCNC: 128 MMOL/L — LOW (ref 135–145)
TROPONIN T, HIGH SENSITIVITY RESULT: 143 NG/L — HIGH (ref 0–51)
TROPONIN T, HIGH SENSITIVITY RESULT: 150 NG/L — HIGH (ref 0–51)
TROPONIN T, HIGH SENSITIVITY RESULT: 152 NG/L — HIGH (ref 0–51)
WBC # BLD: 7.1 K/UL — SIGNIFICANT CHANGE UP (ref 3.8–10.5)
WBC # BLD: 7.3 K/UL — SIGNIFICANT CHANGE UP (ref 3.8–10.5)
WBC # FLD AUTO: 7.1 K/UL — SIGNIFICANT CHANGE UP (ref 3.8–10.5)
WBC # FLD AUTO: 7.3 K/UL — SIGNIFICANT CHANGE UP (ref 3.8–10.5)

## 2018-11-16 PROCEDURE — 93010 ELECTROCARDIOGRAM REPORT: CPT

## 2018-11-16 PROCEDURE — 99291 CRITICAL CARE FIRST HOUR: CPT

## 2018-11-16 PROCEDURE — 99291 CRITICAL CARE FIRST HOUR: CPT | Mod: GC

## 2018-11-16 PROCEDURE — 71045 X-RAY EXAM CHEST 1 VIEW: CPT | Mod: 26

## 2018-11-16 RX ORDER — DEXTROSE 50 % IN WATER 50 %
25 SYRINGE (ML) INTRAVENOUS ONCE
Qty: 0 | Refills: 0 | Status: DISCONTINUED | OUTPATIENT
Start: 2018-11-16 | End: 2018-11-19

## 2018-11-16 RX ORDER — INSULIN HUMAN 100 [IU]/ML
10 INJECTION, SOLUTION SUBCUTANEOUS ONCE
Qty: 0 | Refills: 0 | Status: COMPLETED | OUTPATIENT
Start: 2018-11-16 | End: 2018-11-16

## 2018-11-16 RX ORDER — SODIUM CHLORIDE 9 MG/ML
500 INJECTION INTRAMUSCULAR; INTRAVENOUS; SUBCUTANEOUS ONCE
Qty: 0 | Refills: 0 | Status: DISCONTINUED | OUTPATIENT
Start: 2018-11-16 | End: 2018-11-19

## 2018-11-16 RX ORDER — DEXTROSE 10 % IN WATER 10 %
1000 INTRAVENOUS SOLUTION INTRAVENOUS
Qty: 0 | Refills: 0 | Status: DISCONTINUED | OUTPATIENT
Start: 2018-11-16 | End: 2018-11-17

## 2018-11-16 RX ORDER — HEPARIN SODIUM 5000 [USP'U]/ML
5000 INJECTION INTRAVENOUS; SUBCUTANEOUS EVERY 8 HOURS
Qty: 0 | Refills: 0 | Status: DISCONTINUED | OUTPATIENT
Start: 2018-11-16 | End: 2018-11-19

## 2018-11-16 RX ORDER — SODIUM CHLORIDE 9 MG/ML
1000 INJECTION, SOLUTION INTRAVENOUS
Qty: 0 | Refills: 0 | Status: DISCONTINUED | OUTPATIENT
Start: 2018-11-16 | End: 2018-11-19

## 2018-11-16 RX ORDER — DEXTROSE 50 % IN WATER 50 %
15 SYRINGE (ML) INTRAVENOUS ONCE
Qty: 0 | Refills: 0 | Status: DISCONTINUED | OUTPATIENT
Start: 2018-11-16 | End: 2018-11-19

## 2018-11-16 RX ORDER — INSULIN HUMAN 100 [IU]/ML
0.5 INJECTION, SOLUTION SUBCUTANEOUS
Qty: 100 | Refills: 0 | Status: DISCONTINUED | OUTPATIENT
Start: 2018-11-16 | End: 2018-11-17

## 2018-11-16 RX ORDER — DEXTROSE 50 % IN WATER 50 %
12.5 SYRINGE (ML) INTRAVENOUS ONCE
Qty: 0 | Refills: 0 | Status: DISCONTINUED | OUTPATIENT
Start: 2018-11-16 | End: 2018-11-19

## 2018-11-16 RX ORDER — GLUCAGON INJECTION, SOLUTION 0.5 MG/.1ML
1 INJECTION, SOLUTION SUBCUTANEOUS ONCE
Qty: 0 | Refills: 0 | Status: DISCONTINUED | OUTPATIENT
Start: 2018-11-16 | End: 2018-11-19

## 2018-11-16 RX ADMIN — INSULIN HUMAN 10 UNIT(S): 100 INJECTION, SOLUTION SUBCUTANEOUS at 17:36

## 2018-11-16 RX ADMIN — INSULIN HUMAN 0.5 UNIT(S)/HR: 100 INJECTION, SOLUTION SUBCUTANEOUS at 17:37

## 2018-11-16 NOTE — H&P ADULT - ASSESSMENT
#Neuro    #Resp  - CXR no sings of infection.   - NC for goal saturation >90%.     #CV  - Patient with hx of CAD and HFrEF.   - pro-bnp >70, 000 and trops 150s.   - EKG serial. Trend cardiac enzymes.       #GI  - No issues.     #ID  - Pending U/a, RVP, Blood cultures, Urine culture.     #Renal  - ESRD on dialysis. Last dialysis on 11/15.   - Nephrology called on 11/16 5pm, awaiting call back for dialysis scheduling.     #Heme  - Macrocytic anemia appreciated. Check folic acid and vitamin b-12 levels.   - Monitor H/H.     #Endo  - Patient with DKA, with AG 29.   - S/p 10 units of insulin, and placed on insulin drip 0.5/hr.   - BMP q4hrs   - FS hourly.     #DVT PPx  - Heparin sub -q for DVT prophylaxis 62 yo F with T1DM c/b ESRD on HD, CAD, HFrEF, TIA, PVD, neuropathy recent admission for on oct 13-17 fpr DKA s/p transition from insulin gtt to insulin pump on 10/15/18, now presenting for sob for the past 3 hours, found with mild DKA.     #Neuro  - Patient AxO3, at baseline.     #Resp  - CXR no sings of infection.   - NC for goal saturation >90%.     #CV  - Patient with hx of CAD and HFrEF.   - pro-bnp >70, 000 and trops 150s.   - EKG serial. Trend cardiac enzymes.     #GI  - NPO until gas closes.   - No issues.     #ID  - Pending U/a, RVP, Blood cultures, Urine u/a, urine culture.     #Renal  - ESRD on dialysis. Last dialysis on 11/15.   - Nephrology called on 11/16 5pm, awaiting call back for dialysis scheduling.     #Heme  - Macrocytic anemia appreciated. Check folic acid and vitamin b-12 levels.   - Monitor H/H.     #Endo  - Patient with hyperglycemia and ph 7.25, with AG 29.   - S/p 10 units of insulin, and placed on insulin drip 0.5/hr.   - BMP q4hrs   - FS hourly.   - Endocrine consult in the am.     #DVT PPx  - Heparin sub -q for DVT prophylaxis

## 2018-11-16 NOTE — ED ADULT NURSE NOTE - OBJECTIVE STATEMENT
63 year old female A&OX4 PMHX of ESRD, HTN, DM, presents with shortness of breath x several hours. Patient states that she was at rest when shortness of breath began. Patient reports that she receives dialysis Monday, Tuesday, Thursday, Saturday. Patient states that she is frequently short of breath and that it is typically exasperated by  activity. Patient's lung sounds are clear and equal bilaterally. Patient abdomen appears distended. Patient endorses pain to bilateral lower extremities. Patient states that her leg pain is related to diabetic neuropathy. Patient appears labored on arrival. Patient placed on oxygen and is currently at an oxygen saturation of 100%. Patient denies chest pain, dizziness, weakness, nausea, vomiting.

## 2018-11-16 NOTE — H&P ADULT - ATTENDING COMMENTS
critical care time 40 mins  Patient seen and examined.  Agree with resident note as above.  Patient with hx as noted including labile DM1 and ESRD on HD who presents with dyspnea, found to be in DKA.  Also may have some element of acute pulmomary edema from volume overload due to ESRD and acute on chronic systolic heart failure.  Will admit to MICU for insulin gtt with DKA protocol - q1h FSG, q4h chem, transition to basal/bolus or pump once gap is closed.  HD tonight.  Discussed with patient and .  FULL CODE.

## 2018-11-16 NOTE — H&P ADULT - HISTORY OF PRESENT ILLNESS
64 yo F with T1DM c/b ESRD on HD, CAD, HFrEF, TIA, PVD, neuropathy recent admission for on oct 13-17 fpr DKA s/p transition from insulin gtt to insulin pump on 10/15/18, now presenting for sob for the past 3 hours. Patient states she was not doing anything, and she suddenly started feeling sob and called EMS. Prior to this patient was baseline. Endorsed doing well and having dialysis yesterday.  Patient is on an insulin pump, but also uses insulin injections. She is unsure when was the last time she inyected insulin, nor how much insulin she has been inyecting.     In the ED, VS T98.2 HR 80 /62 RR 18 Saturating 100%. CT scan of the chest - showed unchanged ground glass opacities. Labs- macrocytic anemia. Hyponatremia, hyperkalemia, hyperglycemia, elevated pro-bnp. Patient was bolus 10 units of Regular insulin and placed on insulin drip. Was given 500cc of NS 0.9%.       Found with CHF exacerbation, and DKA. 62 yo F with T1DM c/b ESRD on HD, CAD, HFrEF, TIA, PVD, neuropathy recent admission for on oct 13-17 fpr DKA s/p transition from insulin gtt to insulin pump on 10/15/18, now presenting for sob for the past 3 hours. Patient states she was not doing anything, and she suddenly started feeling sob and called EMS. Prior to this patient was baseline. Endorsed doing well and having dialysis yesterday.  Patient is on an insulin pump, which has a basal rate and boluses with meals. She is unsure when was the last time she inyected insulin, nor how much insulin she has been inyecting.     In the ED, VS T98.2 HR 80 /62 RR 18 Saturating 100%. CT scan of the chest - showed unchanged ground glass opacities. Labs- macrocytic anemia. Hyponatremia, hyperkalemia, hyperglycemia, elevated pro-bnp. Patient was bolus 10 units of Regular insulin and placed on insulin drip. Was given 500cc of NS 0.9%.       Found with CHF exacerbation, and DKA.

## 2018-11-16 NOTE — H&P ADULT - NSHPPHYSICALEXAM_GEN_ALL_CORE
VITAL SIGNS (Last 24 hrs):  T(C): 36.8 (11-16-18 @ 14:49), Max: 36.8 (11-16-18 @ 14:49)  HR: 82 (11-16-18 @ 14:49) (80 - 82)  BP: 138/78 (11-16-18 @ 14:49) (130/62 - 138/78)  RR: 18 (11-16-18 @ 14:49) (18 - 18)  SpO2: 100% (11-16-18 @ 14:49) (100% - 100%)  Daily Height in cm: 162.56 (16 Nov 2018 14:19)    Daily     GENERAL: NAD, well-developed  HEAD:  Atraumatic, Normocephalic  EYES: EOMI, PERRLA, conjunctiva and sclera clear  NECK: Supple, No JVD  CHEST/LUNG: Clear to auscultation bilaterally; No wheeze/rhonchi/rales   HEART: Regular rate and rhythm; normal S1 S2,  No murmurs, rubs, or gallops  ABDOMEN: Soft, Nontender, Nondistended; Bowel sounds normal  EXTREMITIES:  2+ Peripheral Pulses, No clubbing, cyanosis, or edema  PSYCH: AAOx3, No acute distress   NEUROLOGY: non-focal  SKIN: No rashes or lesions

## 2018-11-16 NOTE — ED PROVIDER NOTE - MEDICAL DECISION MAKING DETAILS
63F, hx DM1 (insulin), multiple prior DKA episodes, CHF, CAD s/p multiple stents, PVD, ESRD on HD, HLD, CVA presenting w shortness of breath, chest pain. ROS and Exam as above. Concern for ACS. Will check EKG, Labs, CXR. Likely admit given sx, hx, med hx. Currently stable, no acute distress. Will continue to follow up and re-assess. Case discussed with Attending: Sergey Gore MD, PGY2 Emergency Medicine

## 2018-11-16 NOTE — H&P ADULT - NSHPLABSRESULTS_GEN_ALL_CORE
10.7   7.1   )-----------( 286      ( 16 Nov 2018 14:58 )             32.6     Hgb Trend: 10.7<--  11-16    127<L>  |  81<L>  |  32<H>  ----------------------------<  812<HH>  6.4<HH>   |  17<L>  |  5.19<H>    Ca    9.0      16 Nov 2018 14:58    TPro  7.4  /  Alb  4.7  /  TBili  0.5  /  DBili  x   /  AST  20  /  ALT  7<L>  /  AlkPhos  106  11-16    Creatinine Trend: 5.19<--  PT/INR - ( 16 Nov 2018 14:58 )   PT: 10.8 sec;   INR: 0.94 ratio         PTT - ( 16 Nov 2018 14:58 )  PTT:29.7 sec    Troponin T, High Sensitivity (11.16.18 @ 14:58)    Troponin T, High Sensitivity Result: 150: Rapid upward or downward changes in high-sensitivity troponin levels  suggest acute myocardial injury. Renal impairment may cause sustained  troponin elevations.  Normal: <6 - 14 ng/L  Indeterminate: 15-51 ng/L  Elevated: > 51 ng/L  See http://labs/test/TROPTHS on the [facility] intranet for more  information ng/L      CT Chest No Cont (10.31.18 @ 11:03) >    CT of the chest was performed from the thoracic inlet to the level of the   adrenal glands without contrast injection.    Comparison: Prior studies dating back to January 12, 2018    Tubes/Lines:     None.    Mediastinum/Vessels/Heart: Coronary artery calcifications noted. Aorta   and pulmonary arteries are normal in size. There is no pericardial   effusion. Right paratracheal lymph node measures up to 1.9 x 1.8 cm,   unchanged since prior. Thyroid gland is unremarkable    Lungs/Pleura/Airways: Unchanged left upper lobe 9 mm groundglass opacity   and right upper lobe 12 mm groundglass opacity. Several other ground   glass opacities noted in the right lung along with groundglass and solid   appearing nodules at the lung bases which are also stable since the   previous exam. No new nodules.    Visualized abdomen: Left kidney is atrophic. Vascular calcifications   noted. Otherwise, unremarkable noncontrast appearance of the upper abdomen    Bones and soft tissues: There is diffuse bony sclerosis in keeping with   renal osteodystrophy. No suspicious osseous lesions. Degenerative changes   noted throughout the spine.    IMPRESSION:    Bilateral groundglass opacities and nodules which are stable since the   previous exam. The largest is noted in the right upper lobe measuring 12   mm. Continued surveillance recommended    Xray Chest 1 View AP/PA (11.16.18 @ 15:37) >  Impression:  The heart is slightly enlarged. The lungs are clear. No acute pathology   is seen in the visualized bones.

## 2018-11-16 NOTE — ED PROVIDER NOTE - OBJECTIVE STATEMENT
63F, hx DM1 (insulin), multiple prior DKA episodes, CHF, CAD s/p multiple stents, PVD, ESRD on HD, HLD, CVA presents w chief complaint of shortness of breath. Sent by PCP Dr Biswas for eval. Endorses 3 hrs shortness of breath, as well as half hour episode (now resolved) of mid-sternal chest pain. Non radiating chest pain. Denies fevers or chills, headache, dizziness, abdominal pain, cough, urinary sx, weakness. Endorses nausea, vomiting yesterday - none today. Former smoker. Meds below.   PCP Tee Biswas

## 2018-11-16 NOTE — ED PROVIDER NOTE - ATTENDING CONTRIBUTION TO CARE
comorbidities, cp / sob.  clear lungs, rrr.   ekg, xr chest, labs, do not suspect pe or dissection based on history. comorbidities, cp / sob.  clear lungs, rrr.   ekg, xr chest, labs, do not suspect pe or dissection based on history.  8.5U/day per pump setting comorbidities, cp / sob.  clear lungs, rrr.   ekg, xr chest, labs, do not suspect pe or dissection based on history.  8.5U/day basal per pump setting

## 2018-11-16 NOTE — ED ADULT NURSE NOTE - NSIMPLEMENTINTERV_GEN_ALL_ED
Implemented All Universal Safety Interventions:  [place] to call system. Call bell, personal items and telephone within reach. Instruct patient to call for assistance. Room bathroom lighting operational. Non-slip footwear when patient is off stretcher. Physically safe environment: no spills, clutter or unnecessary equipment. Stretcher in lowest position, wheels locked, appropriate side rails in place.

## 2018-11-16 NOTE — ED ADULT NURSE REASSESSMENT NOTE - NS ED NURSE REASSESS COMMENT FT1
Pt started on insulin infusion at .5 mg/hr. Pt had an episode of vomiting while MICU MD was at bedside. Pt pending bed upstairs. She denies any complaints at this time, Repeat blood work sent to the lab and FS on glucometer is reading HI. MD aware.
Diabetes team MD Sergey Kendrick aware and endocrine team to be contacted,

## 2018-11-16 NOTE — ED PROVIDER NOTE - PROGRESS NOTE DETAILS
diabetes educator called for pump by martha wallace. page out to endocrine. Glucose >800. Will administer insulin bolus, start insulin drip, consult JULES Gore MD, PGY2 Emergency Medicine MICU accepted for admit  Aneesh Gore MD, PGY2 Emergency Medicine

## 2018-11-16 NOTE — H&P ADULT - NSHPREVIEWOFSYSTEMS_GEN_ALL_CORE
Constitutional: Fatigue. No Fever, Weight Changes  Eyes: No recent vision problems or eye pain.  ENT: No congestion, ear pain, or sore throat.  Endocrine: No excess sweating, temperature intolerance  Cardiovascular: SOB. No chest pain, palpitations  Respiratory: No cough, congestion, or wheezing.  Gastrointestinal:  nausea, vomiting. No abdominal pain.   Genitourinary: No dysuria, hematuria  Musculoskeletal: No joint swelling, joint pain  Neurologic: No headache, dizziness, focal weakness  Skin: No rashes, hematoma, prupura

## 2018-11-16 NOTE — PATIENT PROFILE ADULT - LAST TOBACCO USE (DD-MM-YY)
pt left seated on commode in BR post Eval @ pt request; back brace donned; IV, HM in place; callbell in reach; pt instructed not to get up alone; call nursing for assist; [place] well; denied pain; RN's Aniyah / Tosha James made aware pt OOB in BR 13-Oct-2018

## 2018-11-17 DIAGNOSIS — E10.10 TYPE 1 DIABETES MELLITUS WITH KETOACIDOSIS WITHOUT COMA: ICD-10-CM

## 2018-11-17 DIAGNOSIS — E78.5 HYPERLIPIDEMIA, UNSPECIFIED: ICD-10-CM

## 2018-11-17 DIAGNOSIS — E10.8 TYPE 1 DIABETES MELLITUS WITH UNSPECIFIED COMPLICATIONS: ICD-10-CM

## 2018-11-17 LAB
ALBUMIN SERPL ELPH-MCNC: 3.8 G/DL — SIGNIFICANT CHANGE UP (ref 3.3–5)
ALBUMIN SERPL ELPH-MCNC: 3.9 G/DL — SIGNIFICANT CHANGE UP (ref 3.3–5)
ALBUMIN SERPL ELPH-MCNC: 3.9 G/DL — SIGNIFICANT CHANGE UP (ref 3.3–5)
ALBUMIN SERPL ELPH-MCNC: 4 G/DL — SIGNIFICANT CHANGE UP (ref 3.3–5)
ALBUMIN SERPL ELPH-MCNC: 4 G/DL — SIGNIFICANT CHANGE UP (ref 3.3–5)
ALBUMIN SERPL ELPH-MCNC: 4.6 G/DL — SIGNIFICANT CHANGE UP (ref 3.3–5)
ALP SERPL-CCNC: 112 U/L — SIGNIFICANT CHANGE UP (ref 40–120)
ALP SERPL-CCNC: 87 U/L — SIGNIFICANT CHANGE UP (ref 40–120)
ALP SERPL-CCNC: 88 U/L — SIGNIFICANT CHANGE UP (ref 40–120)
ALP SERPL-CCNC: 89 U/L — SIGNIFICANT CHANGE UP (ref 40–120)
ALP SERPL-CCNC: 93 U/L — SIGNIFICANT CHANGE UP (ref 40–120)
ALP SERPL-CCNC: 95 U/L — SIGNIFICANT CHANGE UP (ref 40–120)
ALT FLD-CCNC: 10 U/L — SIGNIFICANT CHANGE UP (ref 10–45)
ALT FLD-CCNC: 12 U/L — SIGNIFICANT CHANGE UP (ref 10–45)
ALT FLD-CCNC: 13 U/L — SIGNIFICANT CHANGE UP (ref 10–45)
ALT FLD-CCNC: 14 U/L — SIGNIFICANT CHANGE UP (ref 10–45)
ANION GAP SERPL CALC-SCNC: 16 MMOL/L — SIGNIFICANT CHANGE UP (ref 5–17)
ANION GAP SERPL CALC-SCNC: 17 MMOL/L — SIGNIFICANT CHANGE UP (ref 5–17)
ANION GAP SERPL CALC-SCNC: 21 MMOL/L — HIGH (ref 5–17)
ANION GAP SERPL CALC-SCNC: 22 MMOL/L — HIGH (ref 5–17)
APTT BLD: 25.3 SEC — LOW (ref 27.5–36.3)
APTT BLD: 29.3 SEC — SIGNIFICANT CHANGE UP (ref 27.5–36.3)
AST SERPL-CCNC: 21 U/L — SIGNIFICANT CHANGE UP (ref 10–40)
AST SERPL-CCNC: 23 U/L — SIGNIFICANT CHANGE UP (ref 10–40)
AST SERPL-CCNC: 23 U/L — SIGNIFICANT CHANGE UP (ref 10–40)
AST SERPL-CCNC: 25 U/L — SIGNIFICANT CHANGE UP (ref 10–40)
AST SERPL-CCNC: 26 U/L — SIGNIFICANT CHANGE UP (ref 10–40)
AST SERPL-CCNC: 28 U/L — SIGNIFICANT CHANGE UP (ref 10–40)
B-OH-BUTYR SERPL-SCNC: 0.2 MMOL/L — SIGNIFICANT CHANGE UP
B-OH-BUTYR SERPL-SCNC: 1.7 MMOL/L — HIGH
B-OH-BUTYR SERPL-SCNC: 2.1 MMOL/L — HIGH
B-OH-BUTYR SERPL-SCNC: 2.9 MMOL/L — HIGH
B-OH-BUTYR SERPL-SCNC: 3.3 MMOL/L — HIGH
B-OH-BUTYR SERPL-SCNC: 4.4 MMOL/L — HIGH
BASE EXCESS BLDV CALC-SCNC: 3.4 MMOL/L — HIGH (ref -2–2)
BASE EXCESS BLDV CALC-SCNC: 4.3 MMOL/L — HIGH (ref -2–2)
BASE EXCESS BLDV CALC-SCNC: 5.1 MMOL/L — HIGH (ref -2–2)
BASOPHILS # BLD AUTO: 0 K/UL — SIGNIFICANT CHANGE UP (ref 0–0.2)
BASOPHILS # BLD AUTO: 0 K/UL — SIGNIFICANT CHANGE UP (ref 0–0.2)
BASOPHILS NFR BLD AUTO: 0.3 % — SIGNIFICANT CHANGE UP (ref 0–2)
BASOPHILS NFR BLD AUTO: 0.6 % — SIGNIFICANT CHANGE UP (ref 0–2)
BILIRUB SERPL-MCNC: 0.3 MG/DL — SIGNIFICANT CHANGE UP (ref 0.2–1.2)
BUN SERPL-MCNC: 10 MG/DL — SIGNIFICANT CHANGE UP (ref 7–23)
BUN SERPL-MCNC: 13 MG/DL — SIGNIFICANT CHANGE UP (ref 7–23)
BUN SERPL-MCNC: 5 MG/DL — LOW (ref 7–23)
BUN SERPL-MCNC: 6 MG/DL — LOW (ref 7–23)
BUN SERPL-MCNC: 9 MG/DL — SIGNIFICANT CHANGE UP (ref 7–23)
BUN SERPL-MCNC: <4 MG/DL — LOW (ref 7–23)
CA-I SERPL-SCNC: 1.09 MMOL/L — LOW (ref 1.12–1.3)
CA-I SERPL-SCNC: 1.1 MMOL/L — LOW (ref 1.12–1.3)
CA-I SERPL-SCNC: 1.15 MMOL/L — SIGNIFICANT CHANGE UP (ref 1.12–1.3)
CALCIUM SERPL-MCNC: 8.3 MG/DL — LOW (ref 8.4–10.5)
CALCIUM SERPL-MCNC: 8.3 MG/DL — LOW (ref 8.4–10.5)
CALCIUM SERPL-MCNC: 8.4 MG/DL — SIGNIFICANT CHANGE UP (ref 8.4–10.5)
CALCIUM SERPL-MCNC: 8.5 MG/DL — SIGNIFICANT CHANGE UP (ref 8.4–10.5)
CALCIUM SERPL-MCNC: 8.5 MG/DL — SIGNIFICANT CHANGE UP (ref 8.4–10.5)
CALCIUM SERPL-MCNC: 9.5 MG/DL — SIGNIFICANT CHANGE UP (ref 8.4–10.5)
CHLORIDE BLDV-SCNC: 93 MMOL/L — LOW (ref 96–108)
CHLORIDE BLDV-SCNC: 95 MMOL/L — LOW (ref 96–108)
CHLORIDE BLDV-SCNC: 97 MMOL/L — SIGNIFICANT CHANGE UP (ref 96–108)
CHLORIDE SERPL-SCNC: 91 MMOL/L — LOW (ref 96–108)
CHLORIDE SERPL-SCNC: 92 MMOL/L — LOW (ref 96–108)
CHLORIDE SERPL-SCNC: 93 MMOL/L — LOW (ref 96–108)
CHLORIDE SERPL-SCNC: 95 MMOL/L — LOW (ref 96–108)
CHLORIDE SERPL-SCNC: 97 MMOL/L — SIGNIFICANT CHANGE UP (ref 96–108)
CHLORIDE SERPL-SCNC: 97 MMOL/L — SIGNIFICANT CHANGE UP (ref 96–108)
CK MB CFR SERPL CALC: 7.7 NG/ML — HIGH (ref 0–3.8)
CK SERPL-CCNC: 146 U/L — SIGNIFICANT CHANGE UP (ref 25–170)
CO2 BLDV-SCNC: 28 MMOL/L — SIGNIFICANT CHANGE UP (ref 22–30)
CO2 BLDV-SCNC: 30 MMOL/L — SIGNIFICANT CHANGE UP (ref 22–30)
CO2 BLDV-SCNC: 33 MMOL/L — HIGH (ref 22–30)
CO2 SERPL-SCNC: 21 MMOL/L — LOW (ref 22–31)
CO2 SERPL-SCNC: 22 MMOL/L — SIGNIFICANT CHANGE UP (ref 22–31)
CO2 SERPL-SCNC: 24 MMOL/L — SIGNIFICANT CHANGE UP (ref 22–31)
CO2 SERPL-SCNC: 25 MMOL/L — SIGNIFICANT CHANGE UP (ref 22–31)
CO2 SERPL-SCNC: 26 MMOL/L — SIGNIFICANT CHANGE UP (ref 22–31)
CO2 SERPL-SCNC: 27 MMOL/L — SIGNIFICANT CHANGE UP (ref 22–31)
CREAT SERPL-MCNC: 1.23 MG/DL — SIGNIFICANT CHANGE UP (ref 0.5–1.3)
CREAT SERPL-MCNC: 1.48 MG/DL — HIGH (ref 0.5–1.3)
CREAT SERPL-MCNC: 1.95 MG/DL — HIGH (ref 0.5–1.3)
CREAT SERPL-MCNC: 2.17 MG/DL — HIGH (ref 0.5–1.3)
CREAT SERPL-MCNC: 2.52 MG/DL — HIGH (ref 0.5–1.3)
CREAT SERPL-MCNC: 2.86 MG/DL — HIGH (ref 0.5–1.3)
EOSINOPHIL # BLD AUTO: 0.1 K/UL — SIGNIFICANT CHANGE UP (ref 0–0.5)
EOSINOPHIL # BLD AUTO: 0.2 K/UL — SIGNIFICANT CHANGE UP (ref 0–0.5)
EOSINOPHIL NFR BLD AUTO: 2.3 % — SIGNIFICANT CHANGE UP (ref 0–6)
EOSINOPHIL NFR BLD AUTO: 2.4 % — SIGNIFICANT CHANGE UP (ref 0–6)
FOLATE SERPL-MCNC: 7.1 NG/ML — SIGNIFICANT CHANGE UP
GAS PNL BLDV: 130 MMOL/L — LOW (ref 136–145)
GAS PNL BLDV: 131 MMOL/L — LOW (ref 136–145)
GAS PNL BLDV: 137 MMOL/L — SIGNIFICANT CHANGE UP (ref 136–145)
GAS PNL BLDV: SIGNIFICANT CHANGE UP
GLUCOSE BLDC GLUCOMTR-MCNC: 107 MG/DL — HIGH (ref 70–99)
GLUCOSE BLDC GLUCOMTR-MCNC: 108 MG/DL — HIGH (ref 70–99)
GLUCOSE BLDC GLUCOMTR-MCNC: 109 MG/DL — HIGH (ref 70–99)
GLUCOSE BLDC GLUCOMTR-MCNC: 123 MG/DL — HIGH (ref 70–99)
GLUCOSE BLDC GLUCOMTR-MCNC: 135 MG/DL — HIGH (ref 70–99)
GLUCOSE BLDC GLUCOMTR-MCNC: 136 MG/DL — HIGH (ref 70–99)
GLUCOSE BLDC GLUCOMTR-MCNC: 162 MG/DL — HIGH (ref 70–99)
GLUCOSE BLDC GLUCOMTR-MCNC: 172 MG/DL — HIGH (ref 70–99)
GLUCOSE BLDC GLUCOMTR-MCNC: 187 MG/DL — HIGH (ref 70–99)
GLUCOSE BLDC GLUCOMTR-MCNC: 200 MG/DL — HIGH (ref 70–99)
GLUCOSE BLDC GLUCOMTR-MCNC: 243 MG/DL — HIGH (ref 70–99)
GLUCOSE BLDC GLUCOMTR-MCNC: 257 MG/DL — HIGH (ref 70–99)
GLUCOSE BLDC GLUCOMTR-MCNC: 258 MG/DL — HIGH (ref 70–99)
GLUCOSE BLDC GLUCOMTR-MCNC: 270 MG/DL — HIGH (ref 70–99)
GLUCOSE BLDC GLUCOMTR-MCNC: 278 MG/DL — HIGH (ref 70–99)
GLUCOSE BLDC GLUCOMTR-MCNC: 289 MG/DL — HIGH (ref 70–99)
GLUCOSE BLDC GLUCOMTR-MCNC: 344 MG/DL — HIGH (ref 70–99)
GLUCOSE BLDC GLUCOMTR-MCNC: 358 MG/DL — HIGH (ref 70–99)
GLUCOSE BLDC GLUCOMTR-MCNC: 382 MG/DL — HIGH (ref 70–99)
GLUCOSE BLDC GLUCOMTR-MCNC: 415 MG/DL — HIGH (ref 70–99)
GLUCOSE BLDC GLUCOMTR-MCNC: 426 MG/DL — HIGH (ref 70–99)
GLUCOSE BLDV-MCNC: 126 MG/DL — HIGH (ref 70–99)
GLUCOSE BLDV-MCNC: 229 MG/DL — HIGH (ref 70–99)
GLUCOSE BLDV-MCNC: 268 MG/DL — HIGH (ref 70–99)
GLUCOSE SERPL-MCNC: 110 MG/DL — HIGH (ref 70–99)
GLUCOSE SERPL-MCNC: 129 MG/DL — HIGH (ref 70–99)
GLUCOSE SERPL-MCNC: 180 MG/DL — HIGH (ref 70–99)
GLUCOSE SERPL-MCNC: 239 MG/DL — HIGH (ref 70–99)
GLUCOSE SERPL-MCNC: 285 MG/DL — HIGH (ref 70–99)
GLUCOSE SERPL-MCNC: 355 MG/DL — HIGH (ref 70–99)
HAV IGM SER-ACNC: SIGNIFICANT CHANGE UP
HBV CORE AB SER-ACNC: SIGNIFICANT CHANGE UP
HBV CORE IGM SER-ACNC: SIGNIFICANT CHANGE UP
HBV SURFACE AB SER-ACNC: <3 MIU/ML — LOW
HBV SURFACE AG SER-ACNC: SIGNIFICANT CHANGE UP
HCO3 BLDV-SCNC: 27 MMOL/L — SIGNIFICANT CHANGE UP (ref 21–29)
HCO3 BLDV-SCNC: 28 MMOL/L — SIGNIFICANT CHANGE UP (ref 21–29)
HCO3 BLDV-SCNC: 31 MMOL/L — HIGH (ref 21–29)
HCT VFR BLD CALC: 26.7 % — LOW (ref 34.5–45)
HCT VFR BLD CALC: 35.1 % — SIGNIFICANT CHANGE UP (ref 34.5–45)
HCT VFR BLDA CALC: 28 % — LOW (ref 39–50)
HCT VFR BLDA CALC: 29 % — LOW (ref 39–50)
HCT VFR BLDA CALC: 35 % — LOW (ref 39–50)
HCV AB S/CO SERPL IA: 0.13 S/CO — SIGNIFICANT CHANGE UP
HCV AB SERPL-IMP: SIGNIFICANT CHANGE UP
HGB BLD CALC-MCNC: 11.5 G/DL — SIGNIFICANT CHANGE UP (ref 11.5–15.5)
HGB BLD CALC-MCNC: 9.1 G/DL — LOW (ref 11.5–15.5)
HGB BLD CALC-MCNC: 9.3 G/DL — LOW (ref 11.5–15.5)
HGB BLD-MCNC: 11.6 G/DL — SIGNIFICANT CHANGE UP (ref 11.5–15.5)
HGB BLD-MCNC: 9.1 G/DL — LOW (ref 11.5–15.5)
HOROWITZ INDEX BLDV+IHG-RTO: 21 — SIGNIFICANT CHANGE UP
INR BLD: 0.98 RATIO — SIGNIFICANT CHANGE UP (ref 0.88–1.16)
INR BLD: 0.99 RATIO — SIGNIFICANT CHANGE UP (ref 0.88–1.16)
LACTATE BLDV-MCNC: 1.1 MMOL/L — SIGNIFICANT CHANGE UP (ref 0.7–2)
LACTATE BLDV-MCNC: 1.8 MMOL/L — SIGNIFICANT CHANGE UP (ref 0.7–2)
LACTATE BLDV-MCNC: 2.5 MMOL/L — HIGH (ref 0.7–2)
LYMPHOCYTES # BLD AUTO: 1 K/UL — SIGNIFICANT CHANGE UP (ref 1–3.3)
LYMPHOCYTES # BLD AUTO: 1 K/UL — SIGNIFICANT CHANGE UP (ref 1–3.3)
LYMPHOCYTES # BLD AUTO: 15.5 % — SIGNIFICANT CHANGE UP (ref 13–44)
LYMPHOCYTES # BLD AUTO: 21.2 % — SIGNIFICANT CHANGE UP (ref 13–44)
MAGNESIUM SERPL-MCNC: 1.9 MG/DL — SIGNIFICANT CHANGE UP (ref 1.6–2.6)
MAGNESIUM SERPL-MCNC: 2 MG/DL — SIGNIFICANT CHANGE UP (ref 1.6–2.6)
MAGNESIUM SERPL-MCNC: 2 MG/DL — SIGNIFICANT CHANGE UP (ref 1.6–2.6)
MAGNESIUM SERPL-MCNC: 2.1 MG/DL — SIGNIFICANT CHANGE UP (ref 1.6–2.6)
MAGNESIUM SERPL-MCNC: 2.2 MG/DL — SIGNIFICANT CHANGE UP (ref 1.6–2.6)
MAGNESIUM SERPL-MCNC: 2.2 MG/DL — SIGNIFICANT CHANGE UP (ref 1.6–2.6)
MCHC RBC-ENTMCNC: 33.1 GM/DL — SIGNIFICANT CHANGE UP (ref 32–36)
MCHC RBC-ENTMCNC: 33.1 PG — SIGNIFICANT CHANGE UP (ref 27–34)
MCHC RBC-ENTMCNC: 33.4 PG — SIGNIFICANT CHANGE UP (ref 27–34)
MCHC RBC-ENTMCNC: 34 GM/DL — SIGNIFICANT CHANGE UP (ref 32–36)
MCV RBC AUTO: 100 FL — SIGNIFICANT CHANGE UP (ref 80–100)
MCV RBC AUTO: 98.2 FL — SIGNIFICANT CHANGE UP (ref 80–100)
MONOCYTES # BLD AUTO: 0.6 K/UL — SIGNIFICANT CHANGE UP (ref 0–0.9)
MONOCYTES # BLD AUTO: 0.6 K/UL — SIGNIFICANT CHANGE UP (ref 0–0.9)
MONOCYTES NFR BLD AUTO: 12 % — SIGNIFICANT CHANGE UP (ref 2–14)
MONOCYTES NFR BLD AUTO: 9.7 % — SIGNIFICANT CHANGE UP (ref 2–14)
NEUTROPHILS # BLD AUTO: 3 K/UL — SIGNIFICANT CHANGE UP (ref 1.8–7.4)
NEUTROPHILS # BLD AUTO: 4.8 K/UL — SIGNIFICANT CHANGE UP (ref 1.8–7.4)
NEUTROPHILS NFR BLD AUTO: 64.1 % — SIGNIFICANT CHANGE UP (ref 43–77)
NEUTROPHILS NFR BLD AUTO: 72 % — SIGNIFICANT CHANGE UP (ref 43–77)
OTHER CELLS CSF MANUAL: 10 ML/DL — LOW (ref 18–22)
OTHER CELLS CSF MANUAL: 11 ML/DL — LOW (ref 18–22)
OTHER CELLS CSF MANUAL: 4 ML/DL — LOW (ref 18–22)
PCO2 BLDV: 40 MMHG — SIGNIFICANT CHANGE UP (ref 35–50)
PCO2 BLDV: 42 MMHG — SIGNIFICANT CHANGE UP (ref 35–50)
PCO2 BLDV: 56 MMHG — HIGH (ref 35–50)
PH BLDV: 7.36 — SIGNIFICANT CHANGE UP (ref 7.35–7.45)
PH BLDV: 7.44 — SIGNIFICANT CHANGE UP (ref 7.35–7.45)
PH BLDV: 7.45 — SIGNIFICANT CHANGE UP (ref 7.35–7.45)
PHOSPHATE SERPL-MCNC: 1.6 MG/DL — LOW (ref 2.5–4.5)
PHOSPHATE SERPL-MCNC: 2.3 MG/DL — LOW (ref 2.5–4.5)
PHOSPHATE SERPL-MCNC: 2.9 MG/DL — SIGNIFICANT CHANGE UP (ref 2.5–4.5)
PHOSPHATE SERPL-MCNC: 3 MG/DL — SIGNIFICANT CHANGE UP (ref 2.5–4.5)
PLATELET # BLD AUTO: 220 K/UL — SIGNIFICANT CHANGE UP (ref 150–400)
PLATELET # BLD AUTO: 295 K/UL — SIGNIFICANT CHANGE UP (ref 150–400)
PO2 BLDV: 52 MMHG — HIGH (ref 25–45)
PO2 BLDV: <50 MMHG — HIGH (ref 25–45)
PO2 BLDV: <50 MMHG — LOW (ref 25–45)
POTASSIUM BLDV-SCNC: 3.6 MMOL/L — SIGNIFICANT CHANGE UP (ref 3.5–5.3)
POTASSIUM BLDV-SCNC: 3.6 MMOL/L — SIGNIFICANT CHANGE UP (ref 3.5–5.3)
POTASSIUM BLDV-SCNC: 4.9 MMOL/L — SIGNIFICANT CHANGE UP (ref 3.5–5.3)
POTASSIUM SERPL-MCNC: 3.2 MMOL/L — LOW (ref 3.5–5.3)
POTASSIUM SERPL-MCNC: 3.8 MMOL/L — SIGNIFICANT CHANGE UP (ref 3.5–5.3)
POTASSIUM SERPL-MCNC: 3.9 MMOL/L — SIGNIFICANT CHANGE UP (ref 3.5–5.3)
POTASSIUM SERPL-MCNC: 4 MMOL/L — SIGNIFICANT CHANGE UP (ref 3.5–5.3)
POTASSIUM SERPL-MCNC: 4.6 MMOL/L — SIGNIFICANT CHANGE UP (ref 3.5–5.3)
POTASSIUM SERPL-MCNC: 4.9 MMOL/L — SIGNIFICANT CHANGE UP (ref 3.5–5.3)
POTASSIUM SERPL-SCNC: 3.2 MMOL/L — LOW (ref 3.5–5.3)
POTASSIUM SERPL-SCNC: 3.8 MMOL/L — SIGNIFICANT CHANGE UP (ref 3.5–5.3)
POTASSIUM SERPL-SCNC: 3.9 MMOL/L — SIGNIFICANT CHANGE UP (ref 3.5–5.3)
POTASSIUM SERPL-SCNC: 4 MMOL/L — SIGNIFICANT CHANGE UP (ref 3.5–5.3)
POTASSIUM SERPL-SCNC: 4.6 MMOL/L — SIGNIFICANT CHANGE UP (ref 3.5–5.3)
POTASSIUM SERPL-SCNC: 4.9 MMOL/L — SIGNIFICANT CHANGE UP (ref 3.5–5.3)
PROT SERPL-MCNC: 6.2 G/DL — SIGNIFICANT CHANGE UP (ref 6–8.3)
PROT SERPL-MCNC: 6.3 G/DL — SIGNIFICANT CHANGE UP (ref 6–8.3)
PROT SERPL-MCNC: 6.4 G/DL — SIGNIFICANT CHANGE UP (ref 6–8.3)
PROT SERPL-MCNC: 6.4 G/DL — SIGNIFICANT CHANGE UP (ref 6–8.3)
PROT SERPL-MCNC: 6.6 G/DL — SIGNIFICANT CHANGE UP (ref 6–8.3)
PROT SERPL-MCNC: 8.2 G/DL — SIGNIFICANT CHANGE UP (ref 6–8.3)
PROTHROM AB SERPL-ACNC: 11.2 SEC — SIGNIFICANT CHANGE UP (ref 10–12.9)
PROTHROM AB SERPL-ACNC: 11.4 SEC — SIGNIFICANT CHANGE UP (ref 10–12.9)
RBC # BLD: 2.71 M/UL — LOW (ref 3.8–5.2)
RBC # BLD: 3.5 M/UL — LOW (ref 3.8–5.2)
RBC # FLD: 13.6 % — SIGNIFICANT CHANGE UP (ref 10.3–14.5)
RBC # FLD: 13.6 % — SIGNIFICANT CHANGE UP (ref 10.3–14.5)
SAO2 % BLDV: 25 % — LOW (ref 67–88)
SAO2 % BLDV: 82 % — SIGNIFICANT CHANGE UP (ref 67–88)
SAO2 % BLDV: 89 % — HIGH (ref 67–88)
SODIUM SERPL-SCNC: 134 MMOL/L — LOW (ref 135–145)
SODIUM SERPL-SCNC: 134 MMOL/L — LOW (ref 135–145)
SODIUM SERPL-SCNC: 135 MMOL/L — SIGNIFICANT CHANGE UP (ref 135–145)
SODIUM SERPL-SCNC: 139 MMOL/L — SIGNIFICANT CHANGE UP (ref 135–145)
TROPONIN T, HIGH SENSITIVITY RESULT: 422 NG/L — HIGH (ref 0–51)
TSH SERPL-MCNC: 0.71 UIU/ML — SIGNIFICANT CHANGE UP (ref 0.27–4.2)
VIT B12 SERPL-MCNC: 350 PG/ML — SIGNIFICANT CHANGE UP (ref 232–1245)
WBC # BLD: 4.7 K/UL — SIGNIFICANT CHANGE UP (ref 3.8–10.5)
WBC # BLD: 6.7 K/UL — SIGNIFICANT CHANGE UP (ref 3.8–10.5)
WBC # FLD AUTO: 4.7 K/UL — SIGNIFICANT CHANGE UP (ref 3.8–10.5)
WBC # FLD AUTO: 6.7 K/UL — SIGNIFICANT CHANGE UP (ref 3.8–10.5)

## 2018-11-17 PROCEDURE — 99291 CRITICAL CARE FIRST HOUR: CPT

## 2018-11-17 PROCEDURE — 93010 ELECTROCARDIOGRAM REPORT: CPT

## 2018-11-17 PROCEDURE — 99223 1ST HOSP IP/OBS HIGH 75: CPT | Mod: GC

## 2018-11-17 RX ORDER — SODIUM CHLORIDE 9 MG/ML
1000 INJECTION, SOLUTION INTRAVENOUS
Qty: 0 | Refills: 0 | Status: DISCONTINUED | OUTPATIENT
Start: 2018-11-17 | End: 2018-11-17

## 2018-11-17 RX ORDER — INSULIN GLARGINE 100 [IU]/ML
9 INJECTION, SOLUTION SUBCUTANEOUS ONCE
Qty: 0 | Refills: 0 | Status: DISCONTINUED | OUTPATIENT
Start: 2018-11-17 | End: 2018-11-17

## 2018-11-17 RX ORDER — INSULIN LISPRO 100/ML
3 VIAL (ML) SUBCUTANEOUS
Qty: 0 | Refills: 0 | Status: DISCONTINUED | OUTPATIENT
Start: 2018-11-17 | End: 2018-11-18

## 2018-11-17 RX ORDER — ASPIRIN/CALCIUM CARB/MAGNESIUM 324 MG
81 TABLET ORAL DAILY
Qty: 0 | Refills: 0 | Status: DISCONTINUED | OUTPATIENT
Start: 2018-11-17 | End: 2018-11-19

## 2018-11-17 RX ORDER — INSULIN GLARGINE 100 [IU]/ML
9 INJECTION, SOLUTION SUBCUTANEOUS AT BEDTIME
Qty: 0 | Refills: 0 | Status: DISCONTINUED | OUTPATIENT
Start: 2018-11-18 | End: 2018-11-18

## 2018-11-17 RX ORDER — METOPROLOL TARTRATE 50 MG
25 TABLET ORAL
Qty: 0 | Refills: 0 | Status: DISCONTINUED | OUTPATIENT
Start: 2018-11-17 | End: 2018-11-19

## 2018-11-17 RX ORDER — SODIUM CHLORIDE 9 MG/ML
500 INJECTION INTRAMUSCULAR; INTRAVENOUS; SUBCUTANEOUS ONCE
Qty: 0 | Refills: 0 | Status: COMPLETED | OUTPATIENT
Start: 2018-11-17 | End: 2018-11-17

## 2018-11-17 RX ORDER — INSULIN GLARGINE 100 [IU]/ML
9 INJECTION, SOLUTION SUBCUTANEOUS ONCE
Qty: 0 | Refills: 0 | Status: COMPLETED | OUTPATIENT
Start: 2018-11-17 | End: 2018-11-17

## 2018-11-17 RX ORDER — HYDRALAZINE HCL 50 MG
10 TABLET ORAL ONCE
Qty: 0 | Refills: 0 | Status: COMPLETED | OUTPATIENT
Start: 2018-11-17 | End: 2018-11-17

## 2018-11-17 RX ORDER — INSULIN LISPRO 100/ML
VIAL (ML) SUBCUTANEOUS
Qty: 0 | Refills: 0 | Status: DISCONTINUED | OUTPATIENT
Start: 2018-11-17 | End: 2018-11-18

## 2018-11-17 RX ORDER — OXYCODONE AND ACETAMINOPHEN 5; 325 MG/1; MG/1
1 TABLET ORAL ONCE
Qty: 0 | Refills: 0 | Status: DISCONTINUED | OUTPATIENT
Start: 2018-11-17 | End: 2018-11-17

## 2018-11-17 RX ORDER — POTASSIUM CHLORIDE 20 MEQ
20 PACKET (EA) ORAL
Qty: 0 | Refills: 0 | Status: COMPLETED | OUTPATIENT
Start: 2018-11-17 | End: 2018-11-18

## 2018-11-17 RX ORDER — SODIUM,POTASSIUM PHOSPHATES 278-250MG
1 POWDER IN PACKET (EA) ORAL
Qty: 0 | Refills: 0 | Status: COMPLETED | OUTPATIENT
Start: 2018-11-17 | End: 2018-11-17

## 2018-11-17 RX ORDER — ONDANSETRON 8 MG/1
4 TABLET, FILM COATED ORAL ONCE
Qty: 0 | Refills: 0 | Status: COMPLETED | OUTPATIENT
Start: 2018-11-17 | End: 2018-11-17

## 2018-11-17 RX ORDER — HYDRALAZINE HCL 50 MG
50 TABLET ORAL EVERY 8 HOURS
Qty: 0 | Refills: 0 | Status: DISCONTINUED | OUTPATIENT
Start: 2018-11-17 | End: 2018-11-18

## 2018-11-17 RX ORDER — ATORVASTATIN CALCIUM 80 MG/1
20 TABLET, FILM COATED ORAL AT BEDTIME
Qty: 0 | Refills: 0 | Status: DISCONTINUED | OUTPATIENT
Start: 2018-11-17 | End: 2018-11-19

## 2018-11-17 RX ORDER — INSULIN GLARGINE 100 [IU]/ML
9 INJECTION, SOLUTION SUBCUTANEOUS AT BEDTIME
Qty: 0 | Refills: 0 | Status: DISCONTINUED | OUTPATIENT
Start: 2018-11-17 | End: 2018-11-17

## 2018-11-17 RX ORDER — INSULIN HUMAN 100 [IU]/ML
0.5 INJECTION, SOLUTION SUBCUTANEOUS
Qty: 100 | Refills: 0 | Status: DISCONTINUED | OUTPATIENT
Start: 2018-11-17 | End: 2018-11-17

## 2018-11-17 RX ADMIN — Medication 50 MILLIGRAM(S): at 15:24

## 2018-11-17 RX ADMIN — INSULIN HUMAN 0.5 UNIT(S)/HR: 100 INJECTION, SOLUTION SUBCUTANEOUS at 13:05

## 2018-11-17 RX ADMIN — Medication 25 MILLIGRAM(S): at 21:05

## 2018-11-17 RX ADMIN — Medication 50 MILLIGRAM(S): at 23:17

## 2018-11-17 RX ADMIN — SODIUM CHLORIDE 50 MILLILITER(S): 9 INJECTION, SOLUTION INTRAVENOUS at 14:05

## 2018-11-17 RX ADMIN — SODIUM CHLORIDE 1000 MILLILITER(S): 9 INJECTION INTRAMUSCULAR; INTRAVENOUS; SUBCUTANEOUS at 07:30

## 2018-11-17 RX ADMIN — OXYCODONE AND ACETAMINOPHEN 1 TABLET(S): 5; 325 TABLET ORAL at 08:14

## 2018-11-17 RX ADMIN — INSULIN HUMAN 2 UNIT(S)/HR: 100 INJECTION, SOLUTION SUBCUTANEOUS at 08:13

## 2018-11-17 RX ADMIN — ONDANSETRON 4 MILLIGRAM(S): 8 TABLET, FILM COATED ORAL at 07:21

## 2018-11-17 RX ADMIN — Medication 81 MILLIGRAM(S): at 12:08

## 2018-11-17 RX ADMIN — Medication 1 TABLET(S): at 21:05

## 2018-11-17 RX ADMIN — Medication 1 TABLET(S): at 16:34

## 2018-11-17 RX ADMIN — Medication 3: at 21:05

## 2018-11-17 RX ADMIN — ATORVASTATIN CALCIUM 20 MILLIGRAM(S): 80 TABLET, FILM COATED ORAL at 21:05

## 2018-11-17 RX ADMIN — OXYCODONE AND ACETAMINOPHEN 1 TABLET(S): 5; 325 TABLET ORAL at 06:51

## 2018-11-17 RX ADMIN — Medication 10 MILLIGRAM(S): at 04:52

## 2018-11-17 RX ADMIN — INSULIN GLARGINE 9 UNIT(S): 100 INJECTION, SOLUTION SUBCUTANEOUS at 16:15

## 2018-11-17 RX ADMIN — INSULIN HUMAN 1.5 UNIT(S)/HR: 100 INJECTION, SOLUTION SUBCUTANEOUS at 11:12

## 2018-11-17 RX ADMIN — Medication 20 MILLIEQUIVALENT(S): at 16:27

## 2018-11-17 NOTE — CONSULT NOTE ADULT - ATTENDING COMMENTS
Pt personally seen and reviewed. Recurrence of DKA prob due to poor compliance with insulin pump. May need to reconsider pump vs standard insulin regimen with endocrine as out-pt. Agree with insulin infusion with plan to transition to basal/bolus.

## 2018-11-17 NOTE — PROGRESS NOTE ADULT - ATTENDING COMMENTS
Patient seen and examined. Patient with hx of uncontrolled DM1 (non compliant) and ESRD on HD who presents with dyspnea, found to be in DKA.  Also may have some element of acute pulmonary edema from volume overload due to ESRD and acute on chronic systolic heart failure.  admitted to MICU for insulin gtt with DKA protocol   -cont insulin drip,   - q1h FSG, q4h chem, transition to lantus once gap is closed.    -HD as per renal.

## 2018-11-17 NOTE — PROGRESS NOTE ADULT - SUBJECTIVE AND OBJECTIVE BOX
[facility]   [phone]  DIALYSIS NOTE  Chief Complaint: ESRD/Ongoing hemodialysis requirement.     24 hour events/subjective:      seen earlier. lethargic barely answered any questions       ALLERGIES & MEDICATIONS  --------------------------------------------------------------------------------  Allergies    No Known Allergies    Intolerances      Standing Inpatient Medications  aspirin  chewable 81 milliGRAM(s) Oral daily  atorvastatin 20 milliGRAM(s) Oral at bedtime  dextrose 5%. 1000 milliLiter(s) IV Continuous <Continuous>  dextrose 5%. 1000 milliLiter(s) IV Continuous <Continuous>  dextrose 50% Injectable 12.5 Gram(s) IV Push once  dextrose 50% Injectable 25 Gram(s) IV Push once  dextrose 50% Injectable 25 Gram(s) IV Push once  heparin  Injectable 5000 Unit(s) SubCutaneous every 8 hours  hydrALAZINE 50 milliGRAM(s) Oral every 8 hours  insulin glargine Injectable (LANTUS) 9 Unit(s) SubCutaneous once  insulin Infusion 0.5 Unit(s)/Hr IV Continuous <Continuous>  insulin lispro (HumaLOG) corrective regimen sliding scale   SubCutaneous Before meals and at bedtime  insulin lispro Injectable (HumaLOG) 3 Unit(s) SubCutaneous three times a day before meals  metoprolol tartrate 25 milliGRAM(s) Oral two times a day  potassium chloride    Tablet ER 20 milliEquivalent(s) Oral two times a day  sodium chloride 0.9% Bolus 500 milliLiter(s) IV Bolus once    PRN Inpatient Medications  dextrose 40% Gel 15 Gram(s) Oral once PRN  glucagon  Injectable 1 milliGRAM(s) IntraMuscular once PRN      REVIEW OF SYSTEMS  --------------------------------------------------------------------------------  pt did not answer questions         VITALS/PHYSICAL EXAM  --------------------------------------------------------------------------------  T(C): 37 (11-17-18 @ 15:10), Max: 37.3 (11-17-18 @ 08:00)  HR: 85 (11-17-18 @ 15:10) (71 - 91)  BP: 163/75 (11-17-18 @ 15:10) (82/48 - 180/86)  RR: 18 (11-17-18 @ 15:10) (13 - 32)  SpO2: 100% (11-17-18 @ 15:10) (93% - 100%)  Wt(kg): --  Height (cm): 162.56 (11-16-18 @ 14:19)  Weight (kg): 53.5 (11-16-18 @ 14:19)  BMI (kg/m2): 20.2 (11-16-18 @ 14:19)  BSA (m2): 1.56 (11-16-18 @ 14:19)      11-16-18 @ 07:01  -  11-17-18 @ 07:00  --------------------------------------------------------  IN: 859.5 mL / OUT: 1600 mL / NET: -740.5 mL    11-17-18 @ 07:01  -  11-17-18 @ 16:11  --------------------------------------------------------  IN: 1610 mL / OUT: 2400 mL / NET: -790 mL      Physical Exam:  		  Gen: ill appearing F  	no jvd , supple neck,   	Pulm: decrease bs  no rales or ronchi or wheezing  	CV: RRR, S1S2; no rub  	Abd: +BS, soft, nontender/nondistended  	: No suprapubic tenderness  	UE: Warm, no cyanosis  no clubbing,  no edema  	LE: Warm, no cyanosis  no clubbing, no edema RLE LLE + edema   	    LABS/STUDIES  --------------------------------------------------------------------------------              9.1    4.7   >-----------<  220      [11-17-18 @ 02:48]              26.7     139  |  97  |  5   ----------------------------<  110      [11-17-18 @ 13:46]  3.2   |  25  |  1.23        Ca     8.3     [11-17-18 @ 13:46]      Mg     2.0     [11-17-18 @ 13:46]      Phos  1.6     [11-17-18 @ 13:46]    TPro  6.4  /  Alb  3.9  /  TBili  0.3  /  DBili  x   /  AST  21  /  ALT  13  /  AlkPhos  87  [11-17-18 @ 13:46]    PT/INR: PT 11.4 , INR 0.99       [11-17-18 @ 02:48]  PTT: 25.3       [11-17-18 @ 02:48]          [11-17-18 @ 02:48]            imp/suggest: ESRD      Hemodialysis Prescription:  	Access:  	Dialyzer: revaclear   	Blood Flow (mL/Min): 400  	Dialysate Flow (mL/Min): 600  	Target UF (Liters):  	Treatment Time:  	Potassium:   	Calcium: 2.5  	  YOLANDA    Vitamin D     continue with hd   see hd flow sheet

## 2018-11-17 NOTE — PROGRESS NOTE ADULT - SUBJECTIVE AND OBJECTIVE BOX
INTERVAL HPI/OVERNIGHT EVENTS:    SUBJECTIVE: Patient seen and examined at bedside.     CONSTITUTIONAL: No weakness, fevers or chills  EYES/ENT: No visual changes;  No vertigo or throat pain   NECK: No pain or stiffness  RESPIRATORY: No cough, wheezing, hemoptysis; No shortness of breath  CARDIOVASCULAR: No chest pain or palpitations  GASTROINTESTINAL: No abdominal or epigastric pain. No nausea, vomiting, or hematemesis; No diarrhea or constipation. No melena or hematochezia.  GENITOURINARY: No dysuria, frequency or hematuria  NEUROLOGICAL: No numbness or weakness  SKIN: No itching, rashes    OBJECTIVE:    VITAL SIGNS:  ICU Vital Signs Last 24 Hrs  T(C): 36.8 (17 Nov 2018 04:00), Max: 36.9 (16 Nov 2018 18:56)  T(F): 98.3 (17 Nov 2018 04:00), Max: 98.5 (16 Nov 2018 18:56)  HR: 78 (17 Nov 2018 07:30) (71 - 91)  BP: 82/48 (17 Nov 2018 07:30) (82/48 - 180/86)  BP(mean): 61 (17 Nov 2018 07:30) (61 - 156)  ABP: --  ABP(mean): --  RR: 26 (17 Nov 2018 07:30) (13 - 32)  SpO2: 100% (17 Nov 2018 07:30) (93% - 100%)        11-16 @ 07:01  -  11-17 @ 07:00  --------------------------------------------------------  IN: 859.5 mL / OUT: 1600 mL / NET: -740.5 mL      CAPILLARY BLOOD GLUCOSE      POCT Blood Glucose.: 382 mg/dL (17 Nov 2018 06:55)      PHYSICAL EXAM:    General: NAD  HEENT: NC/AT; PERRL, clear conjunctiva  Neck: supple  Respiratory: CTA b/l  Cardiovascular: +S1/S2; RRR  Abdomen: soft, NT/ND; +BS x4  Extremities: WWP, 2+ peripheral pulses b/l; no LE edema  Skin: normal color and turgor; no rash  Neurological:    MEDICATIONS:  MEDICATIONS  (STANDING):  dextrose 5%. 1000 milliLiter(s) (50 mL/Hr) IV Continuous <Continuous>  dextrose 50% Injectable 12.5 Gram(s) IV Push once  dextrose 50% Injectable 25 Gram(s) IV Push once  dextrose 50% Injectable 25 Gram(s) IV Push once  heparin  Injectable 5000 Unit(s) SubCutaneous every 8 hours  hydrALAZINE 50 milliGRAM(s) Oral every 8 hours  insulin Infusion 1.5 Unit(s)/Hr (1.5 mL/Hr) IV Continuous <Continuous>  metoprolol tartrate 25 milliGRAM(s) Oral two times a day  sodium chloride 0.9% Bolus 500 milliLiter(s) IV Bolus once    MEDICATIONS  (PRN):  dextrose 40% Gel 15 Gram(s) Oral once PRN Blood Glucose LESS THAN 70 milliGRAM(s)/deciLiter  glucagon  Injectable 1 milliGRAM(s) IntraMuscular once PRN Glucose <70 milliGRAM(s)/deciLiter      ALLERGIES:  Allergies    No Known Allergies    Intolerances        LABS:                        9.1    4.7   )-----------( 220      ( 17 Nov 2018 02:48 )             26.7     11-17    134<L>  |  91<L>  |  10  ----------------------------<  285<H>  4.9   |  21<L>  |  2.52<H>    Ca    8.5      17 Nov 2018 06:33  Phos  2.9     11-17  Mg     2.2     11-17    TPro  6.4  /  Alb  4.0  /  TBili  0.3  /  DBili  x   /  AST  25  /  ALT  12  /  AlkPhos  95  11-17    PT/INR - ( 17 Nov 2018 02:48 )   PT: 11.4 sec;   INR: 0.99 ratio         PTT - ( 17 Nov 2018 02:48 )  PTT:25.3 sec      RADIOLOGY & ADDITIONAL TESTS: Reviewed.    Enzo Chadwick M.D. PGY2  Pager [facility] [id number]/ [facility] [phone] INTERVAL HPI/OVERNIGHT EVENTS: Patient received HD overnight, AG closed but re-opened early this morning after fruit juice x2. Pt also with epigastric pain this morning.    SUBJECTIVE: Patient seen and examined at bedside.     CONSTITUTIONAL: No weakness, fevers or chills  EYES/ENT: No visual changes;  No vertigo or throat pain   NECK: No pain or stiffness  RESPIRATORY: No cough, wheezing, hemoptysis; No shortness of breath  CARDIOVASCULAR: No chest pain or palpitations  GASTROINTESTINAL: Has epigastric pain with nausea and no vomiting or hematemesis; No diarrhea or constipation. No melena or hematochezia.  GENITOURINARY: No dysuria, frequency or hematuria  NEUROLOGICAL: No numbness or weakness  SKIN: No itching, rashes    OBJECTIVE:    VITAL SIGNS:  ICU Vital Signs Last 24 Hrs  T(C): 36.8 (17 Nov 2018 04:00), Max: 36.9 (16 Nov 2018 18:56)  T(F): 98.3 (17 Nov 2018 04:00), Max: 98.5 (16 Nov 2018 18:56)  HR: 78 (17 Nov 2018 07:30) (71 - 91)  BP: 82/48 (17 Nov 2018 07:30) (82/48 - 180/86)  BP(mean): 61 (17 Nov 2018 07:30) (61 - 156)  RR: 26 (17 Nov 2018 07:30) (13 - 32)  SpO2: 100% (17 Nov 2018 07:30) (93% - 100%)        11-16 @ 07:01  -  11-17 @ 07:00  --------------------------------------------------------  IN: 859.5 mL / OUT: 1600 mL / NET: -740.5 mL      CAPILLARY BLOOD GLUCOSE  POCT Blood Glucose.: 382 mg/dL (17 Nov 2018 06:55)  POCT Blood Glucose.: 257: NotifiedMDClndMtr mg/dL (11.17.18 @ 11:01)  POCT Blood Glucose.: 172: NotifiedMDClndMtr mg/dL (11.17.18 @ 12:04)        PHYSICAL EXAM:    General: NAD  HEENT: NC/AT; PERRLA, clear conjunctiva  Neck: supple  Respiratory: CTA b/l  Cardiovascular: +S1/S2; RRR, no M/R/G  Abdomen: soft, NT/ND; +BS x4  Extremities: WWP, 2+ peripheral pulses b/l; no LE edema  Skin: normal color and turgor; no rash  Neurological: AOx3, no neuro deficits     MEDICATIONS:  MEDICATIONS  (STANDING):  dextrose 5%. 1000 milliLiter(s) (50 mL/Hr) IV Continuous <Continuous>  dextrose 50% Injectable 12.5 Gram(s) IV Push once  dextrose 50% Injectable 25 Gram(s) IV Push once  dextrose 50% Injectable 25 Gram(s) IV Push once  heparin  Injectable 5000 Unit(s) SubCutaneous every 8 hours  hydrALAZINE 50 milliGRAM(s) Oral every 8 hours  insulin Infusion 1.5 Unit(s)/Hr (1.5 mL/Hr) IV Continuous <Continuous>  metoprolol tartrate 25 milliGRAM(s) Oral two times a day  sodium chloride 0.9% Bolus 500 milliLiter(s) IV Bolus once    MEDICATIONS  (PRN):  dextrose 40% Gel 15 Gram(s) Oral once PRN Blood Glucose LESS THAN 70 milliGRAM(s)/deciLiter  glucagon  Injectable 1 milliGRAM(s) IntraMuscular once PRN Glucose <70 milliGRAM(s)/deciLiter      ALLERGIES:  No Known Allergies        LABS:                        9.1    4.7   )-----------( 220      ( 17 Nov 2018 02:48 )             26.7     11-17    134<L>  |  91<L>  |  10  ----------------------------<  285<H>  4.9   |  21<L>  |  2.52<H>    Ca    8.5      17 Nov 2018 06:33  Phos  2.9     11-17  Mg     2.2     11-17    TPro  6.4  /  Alb  4.0  /  TBili  0.3  /  DBili  x   /  AST  25  /  ALT  12  /  AlkPhos  95  11-17    PT/INR - ( 17 Nov 2018 02:48 )   PT: 11.4 sec;   INR: 0.99 ratio         PTT - ( 17 Nov 2018 02:48 )  PTT:25.3 sec      RADIOLOGY & ADDITIONAL TESTS: Reviewed.    Enzo Chadwick M.D. PGY2  Pager [facility] [id number]/ [facility] [phone]

## 2018-11-17 NOTE — PROGRESS NOTE ADULT - ASSESSMENT
64 yo F with T1DM c/b ESRD on HD, CAD, HFrEF, TIA, PVD, neuropathy recent admission for on oct 13-17 fpr DKA s/p transition from insulin gtt to insulin pump on 10/15/18, now presenting for sob for the past 3 hours, found with mild DKA.     #Neuro  - Patient AxO3, at baseline.     #Resp  - CXR no sings of infection.   - NC for goal saturation >90%.     #CV  - Patient with hx of CAD and HFrEF.   - pro-bnp >70, 000 and trops 150s.   - EKG serial. Trend cardiac enzymes.     #GI  - NPO until gas closes.   - No issues.     #ID  - Pending U/a, RVP, Blood cultures, Urine u/a, urine culture.     #Renal  - ESRD on dialysis. HD last night  -1.6L removed    #Heme  - Macrocytic anemia appreciated. Check folic acid and vitamin b-12 levels.   - Monitor H/H.     #Endo  - DKA  - c/w insulin drip 0.5/hr as AG has not closed yet  - BMP q4hrs   - FS hourly.  -DM2, uncontrolled- Pt with A1C 8.6  - Endocrine consult in the am.     #DVT PPx  - Heparin sub -q for DVT prophylaxis 62 yo F with T1DM c/b ESRD on HD, CAD, HFrEF, TIA, PVD, neuropathy recent admission for on oct 13-17 fpr DKA s/p transition from insulin gtt to insulin pump on 10/15/18, now presenting for sob for the past 3 hours, found with mild DKA.     #Neuro  - Patient AxO3, at baseline.     #Resp  - CXR clear with no signs of infection or volume overload  - NC for goal saturation >90%.     #CV  - Patient with hx of CAD and HFrEF.   - pro-bnp >70, 000 and trops 150s.   - EKG serial. Trend cardiac enzymes.   -CAD: Re-startign ASA, Lipitor, metoprolol  -HTN: Pt with elevated BP, restarted Hydralazine 50mg q8hr. Episode of hypotension, given 500cc Bolus    #GI  - NPO until gas closes.   - No issues.     #ID  - RVP negative    #Renal  - ESRD on dialysis. HD last night, 1.6L removed  -HD today  -    #Heme  - Macrocytic anemia appreciated. f/u folic acid and vitamin b-12 levels.   - Monitor H/H.     #Endo  - DKA  - c/w insulin drip 0.5/hr as AG has not closed yet  - BMP q4hrs   - FS hourly.  -DM2, uncontrolled- Pt with A1C 8.6  - Endocrine consult in the am.     #DVT PPx  - Heparin sub -q for DVT prophylaxis 64 yo F with T1DM c/b ESRD on HD, CAD, HFrEF, TIA, PVD, neuropathy recent admission for on oct 13-17 fpr DKA s/p transition from insulin gtt to insulin pump on 10/15/18, now presenting for sob for the past 3 hours, found with mild DKA.     #Neuro  - Patient AxO3, at baseline.     #Resp  - CXR clear with no signs of infection or volume overload  - NC for goal saturation >90%.     #CV  - Patient with hx of CAD and HFrEF.   - pro-bnp >70, 000 and trops 150s.   - EKG serial. Trend cardiac enzymes.   -CAD: Re-startign ASA, Lipitor, metoprolol  -HTN: Pt with elevated BP, restarted Hydralazine 50mg q8hr. Episode of hypotension, given 500cc Bolus    #GI  - NPO until gas closes.   - No issues.     #ID  - RVP negative    #Renal  - ESRD on dialysis. HD last night, 1.6L removed  -HD today  -    #Heme  - Macrocytic anemia appreciated. f/u folic acid and vitamin b-12 levels.   - Monitor H/H.     #Endo  - DKA  - c/w insulin drip 0.5/hr as AG has not closed yet  - BMP q4hrs   - FS hourly.  -DM2, uncontrolled- Pt with A1C 8.6  - Endocrine consulted. recs appreciated: Lantus 9 Units and Humalog 3 Units once DKA resolved    #DVT PPx  - Heparin sub -q for DVT prophylaxis

## 2018-11-17 NOTE — CONSULT NOTE ADULT - SUBJECTIVE AND OBJECTIVE BOX
CHIEF COMPLAINT: sob    HISTORY OF PRESENT ILLNESS:  64 yo F with T1DM c/b ESRD on HD, CAD, HFrEF, TIA, PVD, neuropathy recent admission for on oct 13-17 fpr DKA s/p transition from insulin gtt to insulin pump on 10/15/18, now presenting for sob for the past 3 hours. Patient states she was not doing anything, and she suddenly started feeling sob and called EMS. Prior to this patient was baseline. Endorsed doing well and having dialysis yesterday.  Patient is on an insulin pump, which has a basal rate and boluses with meals. She is unsure when was the last time she inyected insulin, nor how much insulin she has been inyecting.     In the ED, VS T98.2 HR 80 /62 RR 18 Saturating 100%. CT scan of the chest - showed unchanged ground glass opacities. Labs- macrocytic anemia. Hyponatremia, hyperkalemia, hyperglycemia, elevated pro-bnp. Patient was bolus 10 units of Regular insulin and placed on insulin drip. Was given 500cc of NS 0.9%.       Found with CHF exacerbation, and DKA.    Allergies  No Known Allergies      MEDICATIONS:  aspirin  chewable 81 milliGRAM(s) Oral daily  heparin  Injectable 5000 Unit(s) SubCutaneous every 8 hours  hydrALAZINE 50 milliGRAM(s) Oral every 8 hours  metoprolol tartrate 25 milliGRAM(s) Oral two times a day  atorvastatin 20 milliGRAM(s) Oral at bedtime  dextrose 40% Gel 15 Gram(s) Oral once PRN  dextrose 50% Injectable 12.5 Gram(s) IV Push once  dextrose 50% Injectable 25 Gram(s) IV Push once  dextrose 50% Injectable 25 Gram(s) IV Push once  glucagon  Injectable 1 milliGRAM(s) IntraMuscular once PRN  insulin Infusion 0.5 Unit(s)/Hr IV Continuous <Continuous>  dextrose 5%. 1000 milliLiter(s) IV Continuous <Continuous>  sodium chloride 0.9% Bolus 500 milliLiter(s) IV Bolus once      PAST MEDICAL & SURGICAL HISTORY:  CHF (congestive heart failure): EF 40-45%  Subclavian vein stenosis, left: s/p stent  DKA, type 1: 1/2015  ACS (acute coronary syndrome): 1/2015 - cath revealed 100% ostial stenosis not amenable to PCI - medical management  TIA (transient ischemic attack): x 2 - 8-9 years ago prior to ASD/VSD repair  CAD (coronary artery disease): s/p stents  Gout: past  CVA (cerebral infarction): with no residual, 8 yrs ago, prior to heart surgery - ST memory loss  Peripheral vascular disease: occluded left fem-pop bypass 5/2015  Diabetes mellitus type 1: Insulin Dependent - Medtronic  Minimed Paradigm Insulin Pump - Novolog  ESRD (end stage renal disease): dialysis  M, tue, thursday, saturday  Hyperlipidemia  Status post device closure of ASD: &quot;clamshell&quot;  History of cardiac catheterization: 1/2015 - no intervention  S/P femoral-popliteal bypass surgery: L and R in 2013 with graft; 5/2015 CFA angioplasty left and ileofemoral endarterectomywith vein patch angioplasty of left fem-pop bypass graft  Multiple vascular surgery both leg, left fempop bypass revision 11/2015  AV (arteriovenous fistula): Left AV graft; revision with stent placement 2-3 years ago  S/P cholecystectomy      FAMILY HISTORY:  Family history of smoking  Family history of hypertension  Family history of cancer (Sibling)      SOCIAL HISTORY:    [ ] Non-smoker  [ ] Smoker  [ ] Alcohol      REVIEW OF SYSTEMS:  See HPI, otherwise complete 10 point review of systems negative    [ ] All others negative	  [ ] Unable to obtain    PHYSICAL EXAM:  T(C): 36.9 (11-17-18 @ 11:30), Max: 37.3 (11-17-18 @ 08:00)  HR: 74 (11-17-18 @ 13:00) (71 - 91)  BP: 160/72 (11-17-18 @ 13:00) (82/48 - 180/86)  RR: 14 (11-17-18 @ 13:00) (13 - 32)  SpO2: 100% (11-17-18 @ 13:00) (93% - 100%)  Wt(kg): --  I&O's Summary    16 Nov 2018 07:01  -  17 Nov 2018 07:00  --------------------------------------------------------  IN: 859.5 mL / OUT: 1600 mL / NET: -740.5 mL    17 Nov 2018 07:01  -  17 Nov 2018 13:30  --------------------------------------------------------  IN: 509 mL / OUT: 0 mL / NET: 509 mL        Appearance: No Acute Distress	  HEENT:  Normal oral mucosa, PERRL, EOMI	  Cardiovascular: Normal S1 S2, No JVD, No murmurs/rubs/gallops  Respiratory: Lungs clear to auscultation bilaterally  Gastrointestinal:  Soft, Non-tender, + BS	  Skin: No rashes, No ecchymoses, No cyanosis	  Neurologic: Non-focal  Extremities: No clubbing, cyanosis or edema  Vascular: Peripheral pulses palpable 2+ bilaterally  Psychiatry: A & O x 3, Mood & affect appropriate    Laboratory Data:	 	    CBC Full  -  ( 17 Nov 2018 02:48 )  WBC Count : 4.7 K/uL  Hemoglobin : 9.1 g/dL  Hematocrit : 26.7 %  Platelet Count - Automated : 220 K/uL  Mean Cell Volume : 98.2 fl  Mean Cell Hemoglobin : 33.4 pg  Mean Cell Hemoglobin Concentration : 34.0 gm/dL  Auto Neutrophil # : 3.0 K/uL  Auto Lymphocyte # : 1.0 K/uL  Auto Monocyte # : 0.6 K/uL  Auto Eosinophil # : 0.1 K/uL  Auto Basophil # : 0.0 K/uL  Auto Neutrophil % : 64.1 %  Auto Lymphocyte % : 21.2 %  Auto Monocyte % : 12.0 %  Auto Eosinophil % : 2.4 %  Auto Basophil % : 0.3 %    11-17    135  |  92<L>  |  13  ----------------------------<  355<H>  4.6   |  22  |  2.86<H>  11-17    134<L>  |  91<L>  |  10  ----------------------------<  285<H>  4.9   |  21<L>  |  2.52<H>    Ca    8.4      17 Nov 2018 10:25  Ca    8.5      17 Nov 2018 06:33  Phos  3.0     11-17  Phos  2.9     11-17  Mg     2.1     11-17  Mg     2.2     11-17    TPro  6.3  /  Alb  3.9  /  TBili  0.3  /  DBili  x   /  AST  23  /  ALT  10  /  AlkPhos  89  11-17  TPro  6.4  /  Alb  4.0  /  TBili  0.3  /  DBili  x   /  AST  25  /  ALT  12  /  AlkPhos  95  11-17      proBNP: Serum Pro-Brain Natriuretic Peptide: >38144 pg/mL (11-16 @ 14:58)    Lipid Profile:   HgA1c: Hemoglobin A1C, Whole Blood: 8.6 % (11-16 @ 20:14)        Assessment:  -DKA  -shortness of breath  -acute on chronic systolic HF  -cad s/p multiple stents  -esrd on hd  -pvd  -tia  -DKA    Recs:  -appreciate MICU care  -c/w insulin and monitoring of anion gap per icu  -sob multifactorial including volume overload and acid-gas disturbance in setting of dka. c/w volume removal woth hd  -nonspecific ekg changes relatively unchanged. will defer ischemic w/u for now. my pursue pharm nst once acute issues resolved. c/w asa, statin, bb and hydralazine as tolerates  -neuro: a and o x 3. mild somnolence in setting of acidosis and hyperglycemia. cont to monitor  -pulmonary: on nasal cannula to maintain sats > 92%  -hsq for dvt ppx  -consider GI ppx with PPI      Greater than 60 minutes spent on total encounter; more than 50% of the visit was spent counseling and/or coordinating care by the attending physician.   	  Jluián Biswas MD   Cardiovascular Diseases  [phone]
[facility]  [phone]  NEPHROLOGY      INITIAL CONSULT NOTE  --------------------------------------------------------------------------------  HPI:      64 yo F with T1DM c/b ESRD on HD, CAD, HFrEF, TIA, PVD, neuropathy recent admission for on oct 13-17 fpr DKA s/p transition from insulin gtt to insulin pump on 10/15/18, now presenting for sob for the past 3 hours. Patient states she was not doing anything, and she suddenly started feeling sob and called EMS. Prior to this patient was baseline. Endorsed doing well and having dialysis yesterday.  Patient is on an insulin pump, which has a basal rate and boluses with meals. She is unsure when was the last time she injected insulin, nor how much insulin she has been injecting. noticed with hyperkalemia. renal consulted called.     In the ED, VS T98.2 HR 80 /62 RR 18 Saturating 100%. CT scan of the chest - showed unchanged ground glass opacities. Labs- macrocytic anemia. hyperkalemia, hyperglycemia, elevated pro-bnp. Patient was bolus 10 units of Regular insulin and placed on insulin drip. Was given 500cc of NS 0.9%. Found with CHF exacerbation, and DKA.         PAST HISTORY  --------------------------------------------------------------------------------  PAST MEDICAL & SURGICAL HISTORY:  CHF (congestive heart failure): EF 40-45%  Subclavian vein stenosis, left: s/p stent  DKA, type 1: 1/2015  ACS (acute coronary syndrome): 1/2015 - cath revealed 100% ostial stenosis not amenable to PCI - medical management  TIA (transient ischemic attack): x 2 - 8-9 years ago prior to ASD/VSD repair  CAD (coronary artery disease): s/p stents  Gout: past  CVA (cerebral infarction): with no residual, 8 yrs ago, prior to heart surgery - ST memory loss  Peripheral vascular disease: occluded left fem-pop bypass 5/2015  Diabetes mellitus type 1: Insulin Dependent - Medtronic  Minimed Paradigm Insulin Pump - Novolog  ESRD (end stage renal disease): dialysis  M, tue, thursday, saturday  Hyperlipidemia  Status post device closure of ASD: &quot;clamshell&quot;  History of cardiac catheterization: 1/2015 - no intervention  S/P femoral-popliteal bypass surgery: L and R in 2013 with graft; 5/2015 CFA angioplasty left and ileofemoral endarterectomywith vein patch angioplasty of left fem-pop bypass graft  Multiple vascular surgery both leg, left fempop bypass revision 11/2015  AV (arteriovenous fistula): Left AV graft; revision with stent placement 2-3 years ago  S/P cholecystectomy    FAMILY HISTORY:  Family history of smoking  Family history of hypertension  Family history of cancer (Sibling)    PAST SOCIAL HISTORY:    ALLERGIES & MEDICATIONS  --------------------------------------------------------------------------------  Allergies    No Known Allergies    Intolerances      Standing Inpatient Medications  dextrose 5%. 1000 milliLiter(s) IV Continuous <Continuous>  dextrose 50% Injectable 12.5 Gram(s) IV Push once  dextrose 50% Injectable 25 Gram(s) IV Push once  dextrose 50% Injectable 25 Gram(s) IV Push once  heparin  Injectable 5000 Unit(s) SubCutaneous every 8 hours  insulin Infusion 0.5 Unit(s)/Hr IV Continuous <Continuous>  sodium chloride 0.9% Bolus 500 milliLiter(s) IV Bolus once    PRN Inpatient Medications  dextrose 40% Gel 15 Gram(s) Oral once PRN  glucagon  Injectable 1 milliGRAM(s) IntraMuscular once PRN      REVIEW OF SYSTEMS  --------------------------------------------------------------------------------  Gen: No  fevers/chills   Skin: No rashes  Head/Eyes/Ears/Mouth: No headache; Normal hearing;  No sinus pain/discomfort, sore throat  Respiratory: + dyspnea, -cough, -wheezing, -  hemoptysis  CV: +  chest pain when see but dissipated quickly in < 2 min +  orthopnea  GI: No abdominal pain, diarrhea, nausea, vomiting +   : No dysuria,   MSK: No joint pain/swelling; no back pain  Neuro: No dizziness/lightheadedness,  also with LLE edema     All other systems were reviewed and are negative, except as noted.    VITALS/PHYSICAL EXAM  --------------------------------------------------------------------------------  T(C): 36.8 (11-16-18 @ 20:00), Max: 36.9 (11-16-18 @ 18:56)  HR: 78 (11-16-18 @ 21:00) (78 - 91)  BP: 152/72 (11-16-18 @ 21:00) (130/62 - 176/144)  RR: 17 (11-16-18 @ 21:00) (16 - 22)  SpO2: 100% (11-16-18 @ 21:00) (99% - 100%)  Wt(kg): --  Height (cm): 162.56 (11-16-18 @ 14:19)  Weight (kg): 53.5 (11-16-18 @ 14:19)  BMI (kg/m2): 20.2 (11-16-18 @ 14:19)  BSA (m2): 1.56 (11-16-18 @ 14:19)      11-16-18 @ 07:01  -  11-16-18 @ 22:11  --------------------------------------------------------  IN: 0.5 mL / OUT: 0 mL / NET: 0.5 mL      Physical Exam:  	Gen: ill appearing F alert    	no jvd , supple neck,   	Pulm: decrease bs  no rales or ronchi or wheezing  	CV: RRR, S1S2; no rub  	Back: No CVA tenderness; no sacral edema  	Abd: +BS, soft, nontender/nondistended  	: No suprapubic tenderness  	UE: Warm, no cyanosis  no clubbing,  no edema  	LE: Warm, no cyanosis  no clubbing, no edema RLE LLE + edema   	Neuro: alert and oriented. speech coherent   	Skin: Warm, no decrease skin turgor   	Vascular access: + avf + bruit and thrill     LABS/STUDIES  --------------------------------------------------------------------------------              10.7   7.3   >-----------<  278      [11-16-18 @ 17:30]              32.4     128  |  79  |  38  ----------------------------<  839      [11-16-18 @ 19:20]  5.3   |  16  |  5.69        Ca     8.7     [11-16-18 @ 19:20]      Mg     2.6     [11-16-18 @ 19:20]      Phos  4.0     [11-16-18 @ 19:20]    TPro  7.1  /  Alb  4.4  /  TBili  0.4  /  DBili  x   /  AST  32  /  ALT  15  /  AlkPhos  101  [11-16-18 @ 19:20]    PT/INR: PT 10.8 , INR 0.94       [11-16-18 @ 14:58]  PTT: 29.7       [11-16-18 @ 14:58]      Creatinine Trend:  SCr 5.69 [11-16 @ 19:20]  SCr 5.19 [11-16 @ 14:58]    Urinalysis - [06-04-17 @ 08:24]      Color Yellow / Appearance Clear / SG 1.013 / pH 8.5      Gluc 1000 / Ketone Negative  / Bili Negative / Urobili Negative       Blood Negative / Protein >600 / Leuk Est Small / Nitrite Negative      RBC 3-5 / WBC 6-10 / Hyaline  / Gran  / Sq Epi  / Non Sq Epi OCC / Bacteria       PTH -- (Ca 8.3)      [03-03-18 @ 08:53]   134  HbA1c 9.1      [10-16-18 @ 08:13]  TSH 1.07      [12-19-17 @ 06:12]  Lipid: chol 113, TG 86, HDL 59, LDL 37      [04-30-18 @ 06:44]    HBsAb <3.0      [07-03-18 @ 09:45]  HBsAb Nonreact      [05-02-15 @ 15:35]  HBsAg Nonreact      [07-03-18 @ 09:45]  HBcAb Nonreact      [07-03-18 @ 09:45]  HCV 0.16, Nonreact      [07-03-18 @ 09:45]
HPI:  64 y/o F with uncontrolled T1DM c/b ESRD on HD, neuropathy, and retinopathy, CAD, HFrEF, TIA, PVD, recent admission on Oct 13-17 for DKA, presents with SOB, admitted for management of DKA in MICU. Srum glcuose 819 on admission with anion gap of 29, BHB 8.0, bicarb 17, lactate 2.4. HbA1c is 8.6%. She uses the medtronic 630g insulin pump with Humalog. Pump reviewed - patient changed her site on  11/7, 11/11, 11/15. Does not change her site every 3 days. Also does not bolus insulin for carb intake, boluses insulin for correction only. She states that she counts carbs and boluses insulin for the carbs that she eats but this is not reflected on her insulin pump. She has been seen by our service many times in the past. Her endo is Dr. Pina Ley. BG values recorded in insulin pump reviewed - BG values recently have been mostly in the 300-500 range.     Pump settings:  Basal:  12 AM 0.265  5 am: 0.375    ICR 1:15    ISF:  12 am 1:60  7 am 1:40  6 pm 1:60    -120.  Insulin on board: 6 hours    PAST MEDICAL & SURGICAL HISTORY:  CHF (congestive heart failure): EF 40-45%  Subclavian vein stenosis, left: s/p stent  DKA, type 1: 1/2015  ACS (acute coronary syndrome): 1/2015 - cath revealed 100% ostial stenosis not amenable to PCI - medical management  TIA (transient ischemic attack): x 2 - 8-9 years ago prior to ASD/VSD repair  CAD (coronary artery disease): s/p stents  Gout: past  CVA (cerebral infarction): with no residual, 8 yrs ago, prior to heart surgery - ST memory loss  Peripheral vascular disease: occluded left fem-pop bypass 5/2015  Diabetes mellitus type 1: Insulin Dependent - Medtronic  Minimed Paradigm Insulin Pump - Novolog  ESRD (end stage renal disease): dialysis  M, tue, thursday, saturday  Hyperlipidemia  Status post device closure of ASD: &quot;clamshell&quot;  History of cardiac catheterization: 1/2015 - no intervention  S/P femoral-popliteal bypass surgery: L and R in 2013 with graft; 5/2015 CFA angioplasty left and ileofemoral endarterectomywith vein patch angioplasty of left fem-pop bypass graft  Multiple vascular surgery both leg, left fempop bypass revision 11/2015  AV (arteriovenous fistula): Left AV graft; revision with stent placement 2-3 years ago  S/P cholecystectomy      FAMILY HISTORY:  Family history of smoking  Family history of hypertension  Family history of cancer (Sibling)      Social History:  Former smoker  lives with     Outpatient Medications:  · 	hydrALAZINE 50 mg oral tablet: 1 tab(s) orally every 8 hours  · 	neomycin/polymyxin B/dexamethasone 3.5 mg-10,000 units-1 mg/mL ophthalmic suspension: 1 drop(s) to each affected eye every 8 hours  · 	prednisoLONE acetate 1% ophthalmic suspension: 1 drop(s) to each affected eye 3 times a day  · 	ketorolac 0.5% ophthalmic solution: 1 drop(s) to each affected eye 3 times a day  · 	cyclopentolate 2% ophthalmic solution: 1 drop(s) to each affected eye once a day  · 	metoprolol tartrate 25 mg oral tablet: 1 tab(s) orally 2 times a day  · 	insulin lispro 100 units/mL subcutaneous solution: as per  	insulin pump setting(Insert pump at 8 PM tonight)  	Humalog 3-4 units pre meals until then  · 	atorvastatin 20 mg oral tablet: 1 tab(s) orally once a day (at bedtime)  · 	aspirin 81 mg oral delayed release tablet: 1 tab(s) orally once a day  · 	clopidogrel 75 mg oral tablet: 1 tab(s) orally once a day (at bedtime)    MEDICATIONS  (STANDING):  aspirin  chewable 81 milliGRAM(s) Oral daily  atorvastatin 20 milliGRAM(s) Oral at bedtime  dextrose 5%. 1000 milliLiter(s) (50 mL/Hr) IV Continuous <Continuous>  dextrose 50% Injectable 12.5 Gram(s) IV Push once  dextrose 50% Injectable 25 Gram(s) IV Push once  dextrose 50% Injectable 25 Gram(s) IV Push once  heparin  Injectable 5000 Unit(s) SubCutaneous every 8 hours  hydrALAZINE 50 milliGRAM(s) Oral every 8 hours  insulin Infusion 1.5 Unit(s)/Hr (1.5 mL/Hr) IV Continuous <Continuous>  metoprolol tartrate 25 milliGRAM(s) Oral two times a day  sodium chloride 0.9% Bolus 500 milliLiter(s) IV Bolus once    MEDICATIONS  (PRN):  dextrose 40% Gel 15 Gram(s) Oral once PRN Blood Glucose LESS THAN 70 milliGRAM(s)/deciLiter  glucagon  Injectable 1 milliGRAM(s) IntraMuscular once PRN Glucose <70 milliGRAM(s)/deciLiter      Allergies  No Known Allergies      Review of Systems:  Constitutional: No fever  Eyes: No blurry vision  Neuro: No tremors  HEENT: No pain  Cardiovascular: No chest pain, palpitations  Respiratory: No SOB, no cough  GI: + nausea; no vomiting, abdominal pain  : No dysuria  Skin: no rash  Endocrine: no polyuria, +polydipsia    ALL OTHER SYSTEMS REVIEWED AND NEGATIVE      PHYSICAL EXAM:  VITALS: T(C): 37.3 (11-17-18 @ 08:00)  T(F): 99.1 (11-17-18 @ 08:00), Max: 99.1 (11-17-18 @ 08:00)  HR: 87 (11-17-18 @ 11:00) (71 - 91)  BP: 126/60 (11-17-18 @ 11:00) (82/48 - 180/86)  RR:  (13 - 32)  SpO2:  (93% - 100%)  Wt(kg): --  GENERAL: NAD, well-developed  EYES: No proptosis, anicteric  HEENT:  Atraumatic, Normocephalic  THYROID: Normal size, no palpable nodules  RESPIRATORY: Clear to auscultation bilaterally; No rales, rhonchi, wheezing  CARDIOVASCULAR: Regular rate and rhythm; No murmurs; no peripheral edema  GI: Soft, nontender, non distended, normal bowel sounds  SKIN: Dry, intact, No ulcers or wounds on feeet  PSYCH: Alert and oriented x 3, reactive affect      POCT Blood Glucose.: 257 mg/dL (11-17-18 @ 11:01)  POCT Blood Glucose.: 358 mg/dL (11-17-18 @ 09:58)  POCT Blood Glucose.: 426 mg/dL (11-17-18 @ 09:03)  POCT Blood Glucose.: 415 mg/dL (11-17-18 @ 08:07)  POCT Blood Glucose.: 382 mg/dL (11-17-18 @ 06:55)  POCT Blood Glucose.: 344 mg/dL (11-17-18 @ 06:53)  POCT Blood Glucose.: 187 mg/dL (11-17-18 @ 05:10)  POCT Blood Glucose.: 200 mg/dL (11-17-18 @ 04:13)  POCT Blood Glucose.: 278 mg/dL (11-17-18 @ 03:04)  POCT Blood Glucose.: 270 mg/dL (11-17-18 @ 02:08)  POCT Blood Glucose.: 258 mg/dL (11-17-18 @ 01:11)  POCT Blood Glucose.: 243 mg/dL (11-17-18 @ 00:22)  POCT Blood Glucose.: 201 mg/dL (11-16-18 @ 23:03)  POCT Blood Glucose.: 225 mg/dL (11-16-18 @ 22:04)  POCT Blood Glucose.: 353 mg/dL (11-16-18 @ 21:04)  POCT Blood Glucose.: 495 mg/dL (11-16-18 @ 20:09)  POCT Blood Glucose.: 470 mg/dL (11-16-18 @ 20:07)  POCT Blood Glucose.: >600 mg/dL (11-16-18 @ 18:45)                          9.1    4.7   )-----------( 220      ( 17 Nov 2018 02:48 )             26.7       11-17    135  |  92<L>  |  13  ----------------------------<  355<H>  4.6   |  22  |  2.86<H>    EGFR if : 20<L>  EGFR if non : 17<L>    Ca    8.4      11-17  Mg     2.1     11-17  Phos  3.0     11-17    TPro  6.3  /  Alb  3.9  /  TBili  0.3  /  DBili  x   /  AST  23  /  ALT  10  /  AlkPhos  89  11-17      Hemoglobin A1C, Whole Blood: 8.6 % <H> [4.0 - 5.6] (11-16-18 @ 20:14)  Hemoglobin A1C, Whole Blood: 9.1 % <H> [4.0 - 5.6] (10-16-18 @ 08:13)
HPI:  64 yo F with T1DM c/b ESRD on HD, CAD, HFrEF, TIA, PVD, neuropathy recent admission for on oct 13-17 fpr DKA s/p transition from insulin gtt to insulin pump on 10/15/18, now presenting for sob for the past 3 hours. Patient states she was not doing anything, and she suddenly started feeling sob and called EMS. Prior to this patient was baseline. Endorsed doing well and having dialysis yesterday.  Patient is on an insulin pump, which has a basal rate and boluses with meals. She is unsure when was the last time she inyected insulin, nor how much insulin she has been inyecting.     In the ED, VS T98.2 HR 80 /62 RR 18 Saturating 100%. CT scan of the chest - showed unchanged ground glass opacities. Labs- macrocytic anemia. Hyponatremia, hyperkalemia, hyperglycemia, elevated pro-bnp. Patient was bolus 10 units of Regular insulin and placed on insulin drip.      Found with CHF exacerbation, and DKA.       PAST MEDICAL & SURGICAL HISTORY:  CHF (congestive heart failure): EF 40-45%  Subclavian vein stenosis, left: s/p stent  DKA, type 1: 1/2015  ACS (acute coronary syndrome): 1/2015 - cath revealed 100% ostial stenosis not amenable to PCI - medical management  TIA (transient ischemic attack): x 2 - 8-9 years ago prior to ASD/VSD repair  CAD (coronary artery disease): s/p stents  Gout: past  CVA (cerebral infarction): with no residual, 8 yrs ago, prior to heart surgery - ST memory loss  Peripheral vascular disease: occluded left fem-pop bypass 5/2015  Diabetes mellitus type 1: Insulin Dependent - Medtronic  Minimed Paradigm Insulin Pump - Novolog  ESRD (end stage renal disease): dialysis  M, tue, thursday, saturday  Hyperlipidemia  Status post device closure of ASD: &quot;clamshell&quot;  History of cardiac catheterization: 1/2015 - no intervention  S/P femoral-popliteal bypass surgery: L and R in 2013 with graft; 5/2015 CFA angioplasty left and ileofemoral endarterectomywith vein patch angioplasty of left fem-pop bypass graft  Multiple vascular surgery both leg, left fempop bypass revision 11/2015  AV (arteriovenous fistula): Left AV graft; revision with stent placement 2-3 years ago  S/P cholecystectomy      Review of Systems:   CONSTITUTIONAL: No fever, weight loss, or fatigue  EYES: No eye pain, visual disturbances, or discharge  ENMT:  No difficulty hearing, tinnitus, vertigo; No sinus or throat pain  NECK: No pain or stiffness  BREASTS: No pain, masses, or nipple discharge  RESPIRATORY: No cough, wheezing, chills or hemoptysis; + shortness of breath  CARDIOVASCULAR: No chest pain, palpitations, dizziness, or leg swelling  GASTROINTESTINAL: No abdominal or epigastric pain. No nausea, vomiting, or hematemesis; No diarrhea or constipation. No melena or hematochezia.  GENITOURINARY: No dysuria, frequency, hematuria, or incontinence  NEUROLOGICAL: No headaches, memory loss, loss of strength, numbness, or tremors  SKIN: No itching, burning, rashes, or lesions   LYMPH NODES: No enlarged glands  ENDOCRINE: No heat or cold intolerance; No hair loss  MUSCULOSKELETAL: No joint pain or swelling; No muscle, back, or extremity pain  PSYCHIATRIC: No depression, anxiety, mood swings, or difficulty sleeping  HEME/LYMPH: No easy bruising, or bleeding gums  ALLERY AND IMMUNOLOGIC: No hives or eczema    Allergies    No Known Allergies    Intolerances        Social History:     FAMILY HISTORY:  Family history of smoking  Family history of hypertension  Family history of cancer (Sibling)      MEDICATIONS  (STANDING):  aspirin  chewable 81 milliGRAM(s) Oral daily  atorvastatin 20 milliGRAM(s) Oral at bedtime  dextrose 5%. 1000 milliLiter(s) (50 mL/Hr) IV Continuous <Continuous>  dextrose 50% Injectable 12.5 Gram(s) IV Push once  dextrose 50% Injectable 25 Gram(s) IV Push once  dextrose 50% Injectable 25 Gram(s) IV Push once  heparin  Injectable 5000 Unit(s) SubCutaneous every 8 hours  hydrALAZINE 50 milliGRAM(s) Oral every 8 hours  insulin Infusion 1 Unit(s)/Hr (1 mL/Hr) IV Continuous <Continuous>  metoprolol tartrate 25 milliGRAM(s) Oral two times a day  sodium chloride 0.9% Bolus 500 milliLiter(s) IV Bolus once    MEDICATIONS  (PRN):  dextrose 40% Gel 15 Gram(s) Oral once PRN Blood Glucose LESS THAN 70 milliGRAM(s)/deciLiter  glucagon  Injectable 1 milliGRAM(s) IntraMuscular once PRN Glucose <70 milliGRAM(s)/deciLiter        CAPILLARY BLOOD GLUCOSE  172 (17 Nov 2018 12:00)  257 (17 Nov 2018 11:00)  358 (17 Nov 2018 10:00)  426 (17 Nov 2018 09:00)  415 (17 Nov 2018 08:00)  382 (17 Nov 2018 07:00)      POCT Blood Glucose.: 257 mg/dL (17 Nov 2018 11:01)  POCT Blood Glucose.: 358 mg/dL (17 Nov 2018 09:58)  POCT Blood Glucose.: 426 mg/dL (17 Nov 2018 09:03)  POCT Blood Glucose.: 415 mg/dL (17 Nov 2018 08:07)  POCT Blood Glucose.: 382 mg/dL (17 Nov 2018 06:55)  POCT Blood Glucose.: 344 mg/dL (17 Nov 2018 06:53)  POCT Blood Glucose.: 187 mg/dL (17 Nov 2018 05:10)  POCT Blood Glucose.: 200 mg/dL (17 Nov 2018 04:13)  POCT Blood Glucose.: 278 mg/dL (17 Nov 2018 03:04)  POCT Blood Glucose.: 270 mg/dL (17 Nov 2018 02:08)  POCT Blood Glucose.: 258 mg/dL (17 Nov 2018 01:11)  POCT Blood Glucose.: 243 mg/dL (17 Nov 2018 00:22)  POCT Blood Glucose.: 201 mg/dL (16 Nov 2018 23:03)  POCT Blood Glucose.: 225 mg/dL (16 Nov 2018 22:04)  POCT Blood Glucose.: 353 mg/dL (16 Nov 2018 21:04)  POCT Blood Glucose.: 495 mg/dL (16 Nov 2018 20:09)  POCT Blood Glucose.: 470 mg/dL (16 Nov 2018 20:07)  POCT Blood Glucose.: >600 mg/dL (16 Nov 2018 18:45)    I&O's Summary    16 Nov 2018 07:01  -  17 Nov 2018 07:00  --------------------------------------------------------  IN: 859.5 mL / OUT: 1600 mL / NET: -740.5 mL    17 Nov 2018 07:01  -  17 Nov 2018 12:12  --------------------------------------------------------  IN: 508.5 mL / OUT: 0 mL / NET: 508.5 mL        PHYSICAL EXAM:  GENERAL: NAD  HEAD:  Atraumatic, Normocephalic  EYES: EOMI, PERRLA, conjunctiva and sclera clear  NECK: Supple, No JVD  CHEST/LUNG: Clear to auscultation bilaterally; No wheeze  HEART: Regular rate and rhythm; No murmurs, rubs, or gallops  ABDOMEN: Soft, Nontender, Nondistended; Bowel sounds present  EXTREMITIES:  2+ Peripheral Pulses, No clubbing, cyanosis, or edema  PSYCH: AAOx3  NEUROLOGY: non-focal  SKIN: No rashes or lesions    LABS:                        9.1    4.7   )-----------( 220      ( 17 Nov 2018 02:48 )             26.7     11-17    135  |  92<L>  |  13  ----------------------------<  355<H>  4.6   |  22  |  2.86<H>    Ca    8.4      17 Nov 2018 10:25  Phos  3.0     11-17  Mg     2.1     11-17    TPro  6.3  /  Alb  3.9  /  TBili  0.3  /  DBili  x   /  AST  23  /  ALT  10  /  AlkPhos  89  11-17    PT/INR - ( 17 Nov 2018 02:48 )   PT: 11.4 sec;   INR: 0.99 ratio         PTT - ( 17 Nov 2018 02:48 )  PTT:25.3 sec  CARDIAC MARKERS ( 17 Nov 2018 02:48 )  x     / x     / 146 U/L / x     / 7.7 ng/mL          RADIOLOGY & ADDITIONAL TESTS:    Imaging Personally Reviewed:    Consultant(s) Notes Reviewed:      Care Discussed with Consultants/Other Providers:

## 2018-11-17 NOTE — CONSULT NOTE ADULT - PROBLEM SELECTOR RECOMMENDATION 2
- continue with insulin gtt as patient remains in DKA  - will transition to basal bolus insulin when patient is no longer in DKA  - consistent carb diet when eating and no longer in DKA  - will follow  - outpatient follow up with Dr. Pina Ley

## 2018-11-17 NOTE — CONSULT NOTE ADULT - PROBLEM SELECTOR RECOMMENDATION 9
- patient currently on insulin gtt as she is still in DKA  - recommend continue with insulin gtt until DKA has resolved (anion gap normal with bicarb > 18, normal BHB)  - check FS q1 and BMP q4 hours while on insulin gtt  - once DKA has resolved, will transition to basal bolus insulin (Likely Lantus 9 units and Humalog 3 units with low correction scale)  - will follow

## 2018-11-17 NOTE — CHART NOTE - NSCHARTNOTEFT_GEN_A_CORE
MICU Transfer Note    Transfer from: MICU    Transfer to: (x) Medicine    (  ) Telemetry     (   ) RCU        (    ) Palliative         (   ) Stroke Unit          (   ) __________________    Accepting physician:      MICU COURSE:  63F with T1DM c/b ESRD on HD, CAD, HFrEF, TIA, PVD, neuropathy recent admission for on Oct 13-17 fpr DKA s/p transition from insulin gtt to insulin pump on 10/15/18, presented 11/16 for SOB x 3 hours. Patient states she was not doing anything, and she suddenly started feeling SOB and called EMS. Prior to this patient was at baseline. She received dialysis day prior to admission. She uses insulin pump for T1DM with basal rate and boluses with meals. She has frequent admissions for DKA.     In the ED, VS T98.2 HR 80 /62 RR 18 Saturating 100%. CT scan of the chest - showed unchanged ground glass opacities. Labs- macrocytic anemia. Hyponatremia, hyperkalemia, hyperglycemia, elevated pro-BNP. Patient was bolus 10 units of Regular insulin and placed on insulin drip. Was given 500cc of NS 0.9%.     In MICU, patient maintained on insulin gtts and sugar rapidly corrected. Patient remained on 0.5 units/hr and was on D10 for brief period of time. She had worsening of anion gap after consuming fruit juice in AM of 11/17 and insulin gtts was restarted. It was turned off again at 7 pm and patient monitored for 6 hours post dc of insulin gtts. Although BNP was elevated SOB was determined to be multifactorial in setting of DKA and diuresis was deferred. Patient is currently ambulating independently in unit and feeling well. Endocrinology recommending transition to basal-bolus insulin with Lantus 9 units and Humalog 3 units with low correction scale.       ASSESSMENT & PLAN:       63F with T1DM c/b ESRD on HD, CAD, HFrEF, TIA, PVD, neuropathy with frequent admissions for DKA admitted ot MICU for DKA s/p insulin gtts and transition to basal/bolus insulin.     #Neuro  - Patient AxO3, at baseline.     #Resp  - CXR clear with no signs of infection or volume overload  - Stable oxygen saturation on room air.     #CV  - Patient with hx of CAD and HFrEF.   - Pro-bnp >70, 000 and trops 150s; cardiology planning to do outpatient nuclear stress test and deferring ischemic work up inpatient.   - CAD continue with ASA, lipitor, metoprolol  - HTN: Continue with hydralazine 50mg q8hr as tolerated    #GI  - consistent carbohydrate, DASH diet.     #ID  - no issues.     #Renal  - ESRD on dialysis. HD last night, 1.6L removed. C/w HD per nephrology.     #Heme  - Macrocytic anemia with stable hemoglobin at baseline    #Endo  - DKA resolved, c/w Lantus 9 Units and Humalog 3 Units qAC and follow any additional endocrine recommendations    #DVT PPx  - Heparin sub -q for DVT prophylaxis       For Followup:  [ ] patient will need diabetes teaching if basal/bolus to be continued as she did not remember insulin injections on admission  [ ] continue to monitor cardiac/respiratory status and f/u cardiology recs if any  [ ] titrate insulin requirements as needed        Vital Signs Last 24 Hrs  T(C): 37.1 (17 Nov 2018 20:00), Max: 37.3 (17 Nov 2018 08:00)  T(F): 98.8 (17 Nov 2018 20:00), Max: 99.1 (17 Nov 2018 08:00)  HR: 90 (17 Nov 2018 21:00) (71 - 98)  BP: 140/76 (17 Nov 2018 21:00) (82/48 - 180/86)  BP(mean): 102 (17 Nov 2018 21:00) (61 - 126)  RR: 25 (17 Nov 2018 21:00) (13 - 32)  SpO2: 99% (17 Nov 2018 21:00) (93% - 100%)  I&O's Summary    16 Nov 2018 07:01  -  17 Nov 2018 07:00  --------------------------------------------------------  IN: 859.5 mL / OUT: 1600 mL / NET: -740.5 mL    17 Nov 2018 07:01  -  17 Nov 2018 21:18  --------------------------------------------------------  IN: 2162 mL / OUT: 2400 mL / NET: -238 mL        MEDICATIONS  (STANDING):  aspirin  chewable 81 milliGRAM(s) Oral daily  atorvastatin 20 milliGRAM(s) Oral at bedtime  dextrose 5%. 1000 milliLiter(s) (50 mL/Hr) IV Continuous <Continuous>  dextrose 50% Injectable 12.5 Gram(s) IV Push once  dextrose 50% Injectable 25 Gram(s) IV Push once  dextrose 50% Injectable 25 Gram(s) IV Push once  heparin  Injectable 5000 Unit(s) SubCutaneous every 8 hours  hydrALAZINE 50 milliGRAM(s) Oral every 8 hours  insulin lispro (HumaLOG) corrective regimen sliding scale   SubCutaneous Before meals and at bedtime  insulin lispro Injectable (HumaLOG) 3 Unit(s) SubCutaneous three times a day before meals  metoprolol tartrate 25 milliGRAM(s) Oral two times a day  potassium chloride    Tablet ER 20 milliEquivalent(s) Oral two times a day  sodium chloride 0.9% Bolus 500 milliLiter(s) IV Bolus once    MEDICATIONS  (PRN):  dextrose 40% Gel 15 Gram(s) Oral once PRN Blood Glucose LESS THAN 70 milliGRAM(s)/deciLiter  glucagon  Injectable 1 milliGRAM(s) IntraMuscular once PRN Glucose <70 milliGRAM(s)/deciLiter        LABS                                            9.1                   Neurophils% (auto):   64.1   (11-17 @ 02:48):    4.7  )-----------(220          Lymphocytes% (auto):  21.2                                          26.7                   Eosinphils% (auto):   2.4      Manual%: Neutrophils x    ; Lymphocytes x    ; Eosinophils x    ; Bands%: x    ; Blasts x                                    139    |  97     |  <4                  Calcium: 8.3   / iCa: x      (11-17 @ 17:34)    ----------------------------<  180       Magnesium: 1.9                              4.0     |  26     |  1.48             Phosphorous: 2.3      TPro  6.6    /  Alb  4.0    /  TBili  0.3    /  DBili  x      /  AST  23     /  ALT  13     /  AlkPhos  93     17 Nov 2018 17:34    ( 11-17 @ 02:48 )   PT: 11.4 sec;   INR: 0.99 ratio  aPTT: 25.3 sec MICU Transfer Note    Transfer from: MICU    Transfer to: (x) Medicine    (  ) Telemetry     (   ) RCU        (    ) Palliative         (   ) Stroke Unit          (   ) __________________    Accepting physician: Dr. Viera      MICU COURSE:  63F with T1DM c/b ESRD on HD, CAD, HFrEF, TIA, PVD, neuropathy recent admission for on Oct 13-17 fpr DKA s/p transition from insulin gtt to insulin pump on 10/15/18, presented 11/16 for SOB x 3 hours. Patient states she was not doing anything, and she suddenly started feeling SOB and called EMS. Prior to this patient was at baseline. She received dialysis day prior to admission. She uses insulin pump for T1DM with basal rate and boluses with meals. She has frequent admissions for DKA.     In the ED, VS T98.2 HR 80 /62 RR 18 Saturating 100%. CT scan of the chest - showed unchanged ground glass opacities. Labs- macrocytic anemia. Hyponatremia, hyperkalemia, hyperglycemia, elevated pro-BNP. Patient was bolus 10 units of Regular insulin and placed on insulin drip. Was given 500cc of NS 0.9%.     In MICU, patient maintained on insulin gtts and sugar rapidly corrected. Patient remained on 0.5 units/hr and was on D10 for brief period of time. She had worsening of anion gap after consuming fruit juice in AM of 11/17 and insulin gtts was restarted. It was turned off again at 7 pm and patient monitored for 6 hours post dc of insulin gtts. Although BNP was elevated SOB was determined to be multifactorial in setting of DKA and diuresis was deferred. Patient is currently ambulating independently in unit and feeling well. Endocrinology recommending transition to basal-bolus insulin with Lantus 9 units and Humalog 3 units with low correction scale.       ASSESSMENT & PLAN:       63F with T1DM c/b ESRD on HD, CAD, HFrEF, TIA, PVD, neuropathy with frequent admissions for DKA admitted ot MICU for DKA s/p insulin gtts and transition to basal/bolus insulin.     #Neuro  - Patient AxO3, at baseline.     #Resp  - CXR clear with no signs of infection or volume overload  - Stable oxygen saturation on room air.     #CV  - Patient with hx of CAD and HFrEF.   - Pro-bnp >70, 000 and trops 150s; cardiology planning to do outpatient nuclear stress test and deferring ischemic work up inpatient.   - CAD continue with ASA, lipitor, metoprolol  - HTN: Continue with hydralazine 50mg q8hr as tolerated    #GI  - consistent carbohydrate, DASH diet.     #ID  - no issues.     #Renal  - ESRD on dialysis. HD last night, 1.6L removed. C/w HD per nephrology.     #Heme  - Macrocytic anemia with stable hemoglobin at baseline    #Endo  - DKA resolved, c/w Lantus 9 Units and Humalog 3 Units qAC and follow any additional endocrine recommendations    #DVT PPx  - Heparin sub -q for DVT prophylaxis       For Followup:  [ ] patient will need diabetes teaching if basal/bolus to be continued as she did not remember insulin injections on admission  [ ] continue to monitor cardiac/respiratory status and f/u cardiology recs if any  [ ] titrate insulin requirements as needed        Vital Signs Last 24 Hrs  T(C): 37.1 (17 Nov 2018 20:00), Max: 37.3 (17 Nov 2018 08:00)  T(F): 98.8 (17 Nov 2018 20:00), Max: 99.1 (17 Nov 2018 08:00)  HR: 90 (17 Nov 2018 21:00) (71 - 98)  BP: 140/76 (17 Nov 2018 21:00) (82/48 - 180/86)  BP(mean): 102 (17 Nov 2018 21:00) (61 - 126)  RR: 25 (17 Nov 2018 21:00) (13 - 32)  SpO2: 99% (17 Nov 2018 21:00) (93% - 100%)  I&O's Summary    16 Nov 2018 07:01  -  17 Nov 2018 07:00  --------------------------------------------------------  IN: 859.5 mL / OUT: 1600 mL / NET: -740.5 mL    17 Nov 2018 07:01  -  17 Nov 2018 21:18  --------------------------------------------------------  IN: 2162 mL / OUT: 2400 mL / NET: -238 mL        MEDICATIONS  (STANDING):  aspirin  chewable 81 milliGRAM(s) Oral daily  atorvastatin 20 milliGRAM(s) Oral at bedtime  dextrose 5%. 1000 milliLiter(s) (50 mL/Hr) IV Continuous <Continuous>  dextrose 50% Injectable 12.5 Gram(s) IV Push once  dextrose 50% Injectable 25 Gram(s) IV Push once  dextrose 50% Injectable 25 Gram(s) IV Push once  heparin  Injectable 5000 Unit(s) SubCutaneous every 8 hours  hydrALAZINE 50 milliGRAM(s) Oral every 8 hours  insulin lispro (HumaLOG) corrective regimen sliding scale   SubCutaneous Before meals and at bedtime  insulin lispro Injectable (HumaLOG) 3 Unit(s) SubCutaneous three times a day before meals  metoprolol tartrate 25 milliGRAM(s) Oral two times a day  potassium chloride    Tablet ER 20 milliEquivalent(s) Oral two times a day  sodium chloride 0.9% Bolus 500 milliLiter(s) IV Bolus once    MEDICATIONS  (PRN):  dextrose 40% Gel 15 Gram(s) Oral once PRN Blood Glucose LESS THAN 70 milliGRAM(s)/deciLiter  glucagon  Injectable 1 milliGRAM(s) IntraMuscular once PRN Glucose <70 milliGRAM(s)/deciLiter        LABS                                            9.1                   Neurophils% (auto):   64.1   (11-17 @ 02:48):    4.7  )-----------(220          Lymphocytes% (auto):  21.2                                          26.7                   Eosinphils% (auto):   2.4      Manual%: Neutrophils x    ; Lymphocytes x    ; Eosinophils x    ; Bands%: x    ; Blasts x                                    139    |  97     |  <4                  Calcium: 8.3   / iCa: x      (11-17 @ 17:34)    ----------------------------<  180       Magnesium: 1.9                              4.0     |  26     |  1.48             Phosphorous: 2.3      TPro  6.6    /  Alb  4.0    /  TBili  0.3    /  DBili  x      /  AST  23     /  ALT  13     /  AlkPhos  93     17 Nov 2018 17:34    ( 11-17 @ 02:48 )   PT: 11.4 sec;   INR: 0.99 ratio  aPTT: 25.3 sec

## 2018-11-17 NOTE — PROGRESS NOTE ADULT - ASSESSMENT
62 yo F with T1DM c/b ESRD on HD, CAD, HFrEF, TIA, PVD, neuropathy with chf hyperkalemia, acidosis severe hyperglycemia    1- esrd        HD   avf  3.5 hr   3 k   2 liter fluid removal   revaclear 300 \ bfr 400cc

## 2018-11-17 NOTE — CONSULT NOTE ADULT - ASSESSMENT
64 yo F with T1DM c/b ESRD on HD, CAD, HFrEF, TIA, PVD, neuropathy with chf hyperkalemia, acidosis severe hyperglycemia    1- esrd  2- hyperkalemia  3- acidosis  4- hyperglycemia  5- chf    urgent hd. consent obtained witnessed and placed in chart  hd for fluid removal and hyperkalemia  insulin drip for DKA   to have beta hydroxy butyrate level   d.w er team when seen
63 f with  DKA- continue Insulin gtt, follow gap, Endocrine follow.  Panculture, observe off antibiotics  ESRD- continue HD. Nephrology follow Dr. Schmidt.  CAD, CHF- stable.  ICU care  Pierce Viera MD pager [phone]
64 y/o F with uncontrolled T1DM c/b ESRD on HD, neuropathy, and retinopathy, CAD, HFrEF, TIA, PVD, admitted with DKA in setting of inappropriate insulin pump use

## 2018-11-18 LAB
ALBUMIN SERPL ELPH-MCNC: 3.8 G/DL — SIGNIFICANT CHANGE UP (ref 3.3–5)
ALP SERPL-CCNC: 83 U/L — SIGNIFICANT CHANGE UP (ref 40–120)
ALT FLD-CCNC: 11 U/L — SIGNIFICANT CHANGE UP (ref 10–45)
ANION GAP SERPL CALC-SCNC: 17 MMOL/L — SIGNIFICANT CHANGE UP (ref 5–17)
APTT BLD: 28.3 SEC — SIGNIFICANT CHANGE UP (ref 27.5–36.3)
AST SERPL-CCNC: 20 U/L — SIGNIFICANT CHANGE UP (ref 10–40)
B-OH-BUTYR SERPL-SCNC: 0.2 MMOL/L — SIGNIFICANT CHANGE UP
BASOPHILS # BLD AUTO: 0 K/UL — SIGNIFICANT CHANGE UP (ref 0–0.2)
BASOPHILS NFR BLD AUTO: 1 % — SIGNIFICANT CHANGE UP (ref 0–2)
BILIRUB SERPL-MCNC: 0.2 MG/DL — SIGNIFICANT CHANGE UP (ref 0.2–1.2)
BUN SERPL-MCNC: 8 MG/DL — SIGNIFICANT CHANGE UP (ref 7–23)
CALCIUM SERPL-MCNC: 8.1 MG/DL — LOW (ref 8.4–10.5)
CHLORIDE SERPL-SCNC: 95 MMOL/L — LOW (ref 96–108)
CO2 SERPL-SCNC: 24 MMOL/L — SIGNIFICANT CHANGE UP (ref 22–31)
CREAT SERPL-MCNC: 2.54 MG/DL — HIGH (ref 0.5–1.3)
EOSINOPHIL # BLD AUTO: 0.1 K/UL — SIGNIFICANT CHANGE UP (ref 0–0.5)
EOSINOPHIL NFR BLD AUTO: 1.8 % — SIGNIFICANT CHANGE UP (ref 0–6)
GLUCOSE BLDC GLUCOMTR-MCNC: 152 MG/DL — HIGH (ref 70–99)
GLUCOSE BLDC GLUCOMTR-MCNC: 152 MG/DL — HIGH (ref 70–99)
GLUCOSE BLDC GLUCOMTR-MCNC: 187 MG/DL — HIGH (ref 70–99)
GLUCOSE BLDC GLUCOMTR-MCNC: 236 MG/DL — HIGH (ref 70–99)
GLUCOSE SERPL-MCNC: 209 MG/DL — HIGH (ref 70–99)
HCT VFR BLD CALC: 27.9 % — LOW (ref 34.5–45)
HGB BLD-MCNC: 9.2 G/DL — LOW (ref 11.5–15.5)
INR BLD: 1.02 RATIO — SIGNIFICANT CHANGE UP (ref 0.88–1.16)
LYMPHOCYTES # BLD AUTO: 0.9 K/UL — LOW (ref 1–3.3)
LYMPHOCYTES # BLD AUTO: 20 % — SIGNIFICANT CHANGE UP (ref 13–44)
MAGNESIUM SERPL-MCNC: 2 MG/DL — SIGNIFICANT CHANGE UP (ref 1.6–2.6)
MCHC RBC-ENTMCNC: 33.2 GM/DL — SIGNIFICANT CHANGE UP (ref 32–36)
MCHC RBC-ENTMCNC: 33.5 PG — SIGNIFICANT CHANGE UP (ref 27–34)
MCV RBC AUTO: 101 FL — HIGH (ref 80–100)
MONOCYTES # BLD AUTO: 0.6 K/UL — SIGNIFICANT CHANGE UP (ref 0–0.9)
MONOCYTES NFR BLD AUTO: 14.1 % — HIGH (ref 2–14)
NEUTROPHILS # BLD AUTO: 2.7 K/UL — SIGNIFICANT CHANGE UP (ref 1.8–7.4)
NEUTROPHILS NFR BLD AUTO: 63.1 % — SIGNIFICANT CHANGE UP (ref 43–77)
PHOSPHATE SERPL-MCNC: 4.1 MG/DL — SIGNIFICANT CHANGE UP (ref 2.5–4.5)
PLATELET # BLD AUTO: 249 K/UL — SIGNIFICANT CHANGE UP (ref 150–400)
POTASSIUM SERPL-MCNC: 3.9 MMOL/L — SIGNIFICANT CHANGE UP (ref 3.5–5.3)
POTASSIUM SERPL-SCNC: 3.9 MMOL/L — SIGNIFICANT CHANGE UP (ref 3.5–5.3)
PROT SERPL-MCNC: 6 G/DL — SIGNIFICANT CHANGE UP (ref 6–8.3)
PROTHROM AB SERPL-ACNC: 11.6 SEC — SIGNIFICANT CHANGE UP (ref 10–12.9)
RBC # BLD: 2.76 M/UL — LOW (ref 3.8–5.2)
RBC # FLD: 13.1 % — SIGNIFICANT CHANGE UP (ref 10.3–14.5)
SODIUM SERPL-SCNC: 136 MMOL/L — SIGNIFICANT CHANGE UP (ref 135–145)
WBC # BLD: 4.3 K/UL — SIGNIFICANT CHANGE UP (ref 3.8–10.5)
WBC # FLD AUTO: 4.3 K/UL — SIGNIFICANT CHANGE UP (ref 3.8–10.5)

## 2018-11-18 PROCEDURE — 99232 SBSQ HOSP IP/OBS MODERATE 35: CPT

## 2018-11-18 RX ORDER — OXYCODONE AND ACETAMINOPHEN 5; 325 MG/1; MG/1
1 TABLET ORAL ONCE
Qty: 0 | Refills: 0 | Status: DISCONTINUED | OUTPATIENT
Start: 2018-11-18 | End: 2018-11-18

## 2018-11-18 RX ORDER — ACETAMINOPHEN 500 MG
650 TABLET ORAL ONCE
Qty: 0 | Refills: 0 | Status: COMPLETED | OUTPATIENT
Start: 2018-11-18 | End: 2018-11-18

## 2018-11-18 RX ORDER — HYDRALAZINE HCL 50 MG
50 TABLET ORAL EVERY 8 HOURS
Qty: 0 | Refills: 0 | Status: DISCONTINUED | OUTPATIENT
Start: 2018-11-18 | End: 2018-11-19

## 2018-11-18 RX ORDER — OXYCODONE HYDROCHLORIDE 5 MG/1
5 TABLET ORAL ONCE
Qty: 0 | Refills: 0 | Status: DISCONTINUED | OUTPATIENT
Start: 2018-11-18 | End: 2018-11-18

## 2018-11-18 RX ORDER — INSULIN GLARGINE 100 [IU]/ML
9 INJECTION, SOLUTION SUBCUTANEOUS
Qty: 0 | Refills: 0 | Status: DISCONTINUED | OUTPATIENT
Start: 2018-11-18 | End: 2018-11-19

## 2018-11-18 RX ORDER — INSULIN LISPRO 100/ML
VIAL (ML) SUBCUTANEOUS
Qty: 0 | Refills: 0 | Status: DISCONTINUED | OUTPATIENT
Start: 2018-11-18 | End: 2018-11-19

## 2018-11-18 RX ORDER — INSULIN LISPRO 100/ML
4 VIAL (ML) SUBCUTANEOUS
Qty: 0 | Refills: 0 | Status: DISCONTINUED | OUTPATIENT
Start: 2018-11-18 | End: 2018-11-19

## 2018-11-18 RX ORDER — INSULIN LISPRO 100/ML
VIAL (ML) SUBCUTANEOUS AT BEDTIME
Qty: 0 | Refills: 0 | Status: DISCONTINUED | OUTPATIENT
Start: 2018-11-18 | End: 2018-11-19

## 2018-11-18 RX ADMIN — Medication 1: at 17:56

## 2018-11-18 RX ADMIN — Medication 50 MILLIGRAM(S): at 14:50

## 2018-11-18 RX ADMIN — Medication 1: at 08:56

## 2018-11-18 RX ADMIN — Medication 4 UNIT(S): at 17:57

## 2018-11-18 RX ADMIN — ATORVASTATIN CALCIUM 20 MILLIGRAM(S): 80 TABLET, FILM COATED ORAL at 21:36

## 2018-11-18 RX ADMIN — OXYCODONE AND ACETAMINOPHEN 1 TABLET(S): 5; 325 TABLET ORAL at 21:36

## 2018-11-18 RX ADMIN — Medication 2: at 12:43

## 2018-11-18 RX ADMIN — OXYCODONE AND ACETAMINOPHEN 1 TABLET(S): 5; 325 TABLET ORAL at 02:44

## 2018-11-18 RX ADMIN — OXYCODONE AND ACETAMINOPHEN 1 TABLET(S): 5; 325 TABLET ORAL at 22:35

## 2018-11-18 RX ADMIN — Medication 25 MILLIGRAM(S): at 08:57

## 2018-11-18 RX ADMIN — Medication 50 MILLIGRAM(S): at 08:57

## 2018-11-18 RX ADMIN — Medication 25 MILLIGRAM(S): at 21:35

## 2018-11-18 RX ADMIN — OXYCODONE HYDROCHLORIDE 5 MILLIGRAM(S): 5 TABLET ORAL at 01:43

## 2018-11-18 RX ADMIN — Medication 650 MILLIGRAM(S): at 01:10

## 2018-11-18 RX ADMIN — Medication 50 MILLIGRAM(S): at 21:36

## 2018-11-18 RX ADMIN — HEPARIN SODIUM 5000 UNIT(S): 5000 INJECTION INTRAVENOUS; SUBCUTANEOUS at 05:53

## 2018-11-18 RX ADMIN — INSULIN GLARGINE 9 UNIT(S): 100 INJECTION, SOLUTION SUBCUTANEOUS at 17:55

## 2018-11-18 RX ADMIN — OXYCODONE HYDROCHLORIDE 5 MILLIGRAM(S): 5 TABLET ORAL at 01:13

## 2018-11-18 RX ADMIN — Medication 650 MILLIGRAM(S): at 00:40

## 2018-11-18 RX ADMIN — Medication 81 MILLIGRAM(S): at 12:42

## 2018-11-18 RX ADMIN — Medication 3 UNIT(S): at 12:43

## 2018-11-18 RX ADMIN — Medication 3 UNIT(S): at 08:57

## 2018-11-18 RX ADMIN — OXYCODONE AND ACETAMINOPHEN 1 TABLET(S): 5; 325 TABLET ORAL at 02:14

## 2018-11-18 RX ADMIN — Medication 20 MILLIEQUIVALENT(S): at 05:53

## 2018-11-18 NOTE — PROGRESS NOTE ADULT - ASSESSMENT
64 y/o F with uncontrolled T1DM c/b ESRD on HD, neuropathy, and retinopathy, CAD, HFrEF, TIA, PVD, admitted with DKA in setting of inappropriate insulin pump use. Pt off insulin pump at this time. Will need reinforcement of pump education prior to restarting pump. Per pt,  to bring pump supplies to hospital tonight. BG goal (100-200mg/dl). A1C is improved and patient may be safer to resume pump as she has not done basal/bolus injections for years.

## 2018-11-18 NOTE — PROGRESS NOTE ADULT - ASSESSMENT
64 yo F with T1DM c/b ESRD on HD, CAD, HFrEF, TIA, PVD, neuropathy with chf hyperkalemia, acidosis severe hyperglycemia    1- esrd  2- cad  3- hyperglycemia  4- htn     hd am   cont asa  hydralazine 50 mg tid  metoprolol 25 mg bid  shpt renvela one tab with meals

## 2018-11-18 NOTE — PROGRESS NOTE ADULT - SUBJECTIVE AND OBJECTIVE BOX
Patient is a 63y old  Female who presents with a chief complaint of DKA (18 Nov 2018 13:36)    Asked to assume care  SUBJECTIVE / OVERNIGHT EVENTS: Comfortable without new complaints.   Review of Systems  chest pain no  palpitations no  sob no  nausea no  headache no    MEDICATIONS  (STANDING):  aspirin  chewable 81 milliGRAM(s) Oral daily  atorvastatin 20 milliGRAM(s) Oral at bedtime  dextrose 5%. 1000 milliLiter(s) (50 mL/Hr) IV Continuous <Continuous>  dextrose 50% Injectable 12.5 Gram(s) IV Push once  dextrose 50% Injectable 25 Gram(s) IV Push once  dextrose 50% Injectable 25 Gram(s) IV Push once  heparin  Injectable 5000 Unit(s) SubCutaneous every 8 hours  hydrALAZINE 50 milliGRAM(s) Oral every 8 hours  insulin glargine Injectable (LANTUS) 9 Unit(s) SubCutaneous <User Schedule>  insulin lispro (HumaLOG) corrective regimen sliding scale   SubCutaneous three times a day with meals  insulin lispro (HumaLOG) corrective regimen sliding scale   SubCutaneous at bedtime  insulin lispro Injectable (HumaLOG) 4 Unit(s) SubCutaneous three times a day before meals  metoprolol tartrate 25 milliGRAM(s) Oral two times a day  sodium chloride 0.9% Bolus 500 milliLiter(s) IV Bolus once    MEDICATIONS  (PRN):  dextrose 40% Gel 15 Gram(s) Oral once PRN Blood Glucose LESS THAN 70 milliGRAM(s)/deciLiter  glucagon  Injectable 1 milliGRAM(s) IntraMuscular once PRN Glucose <70 milliGRAM(s)/deciLiter      Vital Signs Last 24 Hrs  T(C): 36.7 (18 Nov 2018 14:48), Max: 37.1 (17 Nov 2018 20:00)  T(F): 98 (18 Nov 2018 14:48), Max: 98.8 (17 Nov 2018 20:00)  HR: 83 (18 Nov 2018 14:48) (71 - 98)  BP: 138/72 (18 Nov 2018 14:48) (129/62 - 176/79)  BP(mean): 95 (18 Nov 2018 02:00) (89 - 114)  RR: 18 (18 Nov 2018 14:48) (14 - 26)  SpO2: 98% (18 Nov 2018 14:48) (96% - 100%)    PHYSICAL EXAM:  GENERAL: NAD, well-developed  HEAD:  Atraumatic, Normocephalic  EYES: EOMI, PERRLA, conjunctiva and sclera clear  NECK: Supple, No JVD  CHEST/LUNG: Clear to auscultation bilaterally; No wheeze  HEART: Regular rate and rhythm; No murmurs, rubs, or gallops  ABDOMEN: Soft, Nontender, Nondistended; Bowel sounds present  EXTREMITIES:  2+ Peripheral Pulses, No clubbing, cyanosis, or edema  PSYCH: AAOx3  NEUROLOGY: non-focal  SKIN: No rashes or lesions    LABS:                        9.2    4.3   )-----------( 249      ( 18 Nov 2018 06:19 )             27.9     11-18    136  |  95<L>  |  8   ----------------------------<  209<H>  3.9   |  24  |  2.54<H>    Ca    8.1<L>      18 Nov 2018 06:19  Phos  4.1     11-18  Mg     2.0     11-18    TPro  6.0  /  Alb  3.8  /  TBili  0.2  /  DBili  x   /  AST  20  /  ALT  11  /  AlkPhos  83  11-18    PT/INR - ( 18 Nov 2018 06:19 )   PT: 11.6 sec;   INR: 1.02 ratio         PTT - ( 18 Nov 2018 06:19 )  PTT:28.3 sec  CARDIAC MARKERS ( 17 Nov 2018 02:48 )  x     / x     / 146 U/L / x     / 7.7 ng/mL            RADIOLOGY & ADDITIONAL TESTS:    Imaging Personally Reviewed:    Consultant(s) Notes Reviewed:      Care Discussed with Consultants/Other Providers:

## 2018-11-18 NOTE — PROGRESS NOTE ADULT - SUBJECTIVE AND OBJECTIVE BOX
[facility]   [phone]  RENAL FOLLOW UP NOTE  --------------------------------------------------------------------------------  Chief Complaint:    24 hour events/subjective:    seen earlier.   states feels better. no sob. weakness improving      PAST HISTORY  --------------------------------------------------------------------------------  No significant changes to PMH, PSH, FHx, SHx, unless otherwise noted    ALLERGIES & MEDICATIONS  --------------------------------------------------------------------------------  Allergies    No Known Allergies    Intolerances      Standing Inpatient Medications  aspirin  chewable 81 milliGRAM(s) Oral daily  atorvastatin 20 milliGRAM(s) Oral at bedtime  dextrose 5%. 1000 milliLiter(s) IV Continuous <Continuous>  dextrose 50% Injectable 12.5 Gram(s) IV Push once  dextrose 50% Injectable 25 Gram(s) IV Push once  dextrose 50% Injectable 25 Gram(s) IV Push once  heparin  Injectable 5000 Unit(s) SubCutaneous every 8 hours  hydrALAZINE 50 milliGRAM(s) Oral every 8 hours  insulin glargine Injectable (LANTUS) 9 Unit(s) SubCutaneous <User Schedule>  insulin lispro (HumaLOG) corrective regimen sliding scale   SubCutaneous three times a day with meals  insulin lispro (HumaLOG) corrective regimen sliding scale   SubCutaneous at bedtime  insulin lispro Injectable (HumaLOG) 4 Unit(s) SubCutaneous three times a day before meals  metoprolol tartrate 25 milliGRAM(s) Oral two times a day  sodium chloride 0.9% Bolus 500 milliLiter(s) IV Bolus once    PRN Inpatient Medications  dextrose 40% Gel 15 Gram(s) Oral once PRN  glucagon  Injectable 1 milliGRAM(s) IntraMuscular once PRN      REVIEW OF SYSTEMS  --------------------------------------------------------------------------------    Gen: denies  fevers/chills,  CVS: denies chest pain/palpitations  Resp: denies SOB/Cough  GI: Denies N/V/Abd pain  : Denies dysuria    All other systems were reviewed and are negative, except as noted.    VITALS/PHYSICAL EXAM  --------------------------------------------------------------------------------  T(C): 36.7 (11-18-18 @ 14:48), Max: 37.1 (11-17-18 @ 20:00)  HR: 83 (11-18-18 @ 14:48) (71 - 98)  BP: 138/72 (11-18-18 @ 14:48) (134/68 - 176/79)  RR: 18 (11-18-18 @ 14:48) (16 - 26)  SpO2: 98% (11-18-18 @ 14:48) (96% - 100%)  Wt(kg): --        11-17-18 @ 07:01  -  11-18-18 @ 07:00  --------------------------------------------------------  IN: 2262 mL / OUT: 2400 mL / NET: -138 mL    11-18-18 @ 07:01  -  11-18-18 @ 19:10  --------------------------------------------------------  IN: 820 mL / OUT: 0 mL / NET: 820 mL      Physical Exam:  	    Physical Exam:  	Gen: alert oriented place person and date   	Pulm: Decreased breath sounds b/l bases. no rales or ronchi or wheezing  	CV: RRR, S1/S2. no rub  	Abd: +BS, soft, nontender/nondistended  	: No suprapubic tenderness.               Extremity: No cyanosis, no edema RLE 1+ LLE edema  no clubbing  	  	    LABS/STUDIES  --------------------------------------------------------------------------------              9.2    4.3   >-----------<  249      [11-18-18 @ 06:19]              27.9     136  |  95  |  8   ----------------------------<  209      [11-18-18 @ 06:19]  3.9   |  24  |  2.54        Ca     8.1     [11-18-18 @ 06:19]      Mg     2.0     [11-18-18 @ 06:19]      Phos  4.1     [11-18-18 @ 06:19]    TPro  6.0  /  Alb  3.8  /  TBili  0.2  /  DBili  x   /  AST  20  /  ALT  11  /  AlkPhos  83  [11-18-18 @ 06:19]    PT/INR: PT 11.6 , INR 1.02       [11-18-18 @ 06:19]  PTT: 28.3       [11-18-18 @ 06:19]          [11-17-18 @ 02:48]    Creatinine Trend:  SCr 2.54 [11-18 @ 06:19]  SCr 1.95 [11-17 @ 22:13]  SCr 1.48 [11-17 @ 17:34]  SCr 1.23 [11-17 @ 13:46]  SCr 2.86 [11-17 @ 10:25]                  PTH -- (Ca 8.3)      [03-03-18 @ 08:53]   134  HbA1c 8.6      [11-16-18 @ 20:14]  TSH 0.71      [11-17-18 @ 08:25]  Lipid: chol 113, TG 86, HDL 59, LDL 37      [04-30-18 @ 06:44]

## 2018-11-18 NOTE — PROGRESS NOTE ADULT - ASSESSMENT
63 f with  DKA- off Insulin gtt, Endocrine follow.  observe off antibiotics  ESRD- continue HD. Nephrology follow Dr. Schmidt.  CAD, CHF- stable.  Pierce Viera MD pager [phone]

## 2018-11-18 NOTE — PROGRESS NOTE ADULT - SUBJECTIVE AND OBJECTIVE BOX
Diabetes Follow up note:  Interval Hx:  63 year old female w/uncontrolled T1DM w/T1DM c/b ESRD on HD, neuropathy, and retinopathy, CAD, HFrEF, TIA, PVD, recent admission on Oct 13-17 for DKA presents w/SOB and DKA. Pt transitioned off insulin gtt to basal/bolus overnight. (off insulin pump at this time). BG upper 100's to 200s today. Pt reports had episodes of uncontrollable vomiting which caused her sugars to rise prior to admission. Pt hopeful to be discharged home tomorrow. Tolerating POs w/good appetite.     Review of Systems:  General: no complaints.   GI: Tolerating POs without any N/V/D/ABD PAIN.  CV: No CP/SOB  ENDO: No S&Sx of hypoglycemia  MEDS:  atorvastatin 20 milliGRAM(s) Oral at bedtime    insulin glargine Injectable (LANTUS) 9 Unit(s) SubCutaneous at bedtime  insulin lispro (HumaLOG) corrective regimen sliding scale   SubCutaneous Before meals and at bedtime  insulin lispro Injectable (HumaLOG) 3 Unit(s) SubCutaneous three times a day before meals    Pump settings:  Pump settings:  Basal:  12 AM 0.265  5 am: 0.375    ICR 1:15    ISF:  12 am 1:60  7 am 1:40  6 pm 1:60    -120.  Insulin on board: 6 hours    Allergies    No Known Allergies      PE:  General: Female lying in bed. NAD  Vital Signs Last 24 Hrs  T(C): 36.9 (18 Nov 2018 08:36), Max: 37.1 (17 Nov 2018 20:00)  T(F): 98.4 (18 Nov 2018 08:36), Max: 98.8 (17 Nov 2018 20:00)  HR: 80 (18 Nov 2018 08:36) (71 - 98)  BP: 134/68 (18 Nov 2018 08:36) (129/62 - 176/79)  BP(mean): 95 (18 Nov 2018 02:00) (89 - 114)  RR: 18 (18 Nov 2018 08:36) (14 - 26)  SpO2: 96% (18 Nov 2018 08:36) (96% - 100%)  Abd: Soft, NT,ND,   Extremities: Warm. no edema  Neuro: A&O X3    LABS:    POCT Blood Glucose.: 236 mg/dL (11-18-18 @ 12:19)  POCT Blood Glucose.: 187 mg/dL (11-18-18 @ 08:27)  POCT Blood Glucose.: 289 mg/dL (11-17-18 @ 21:03)  POCT Blood Glucose.: 135 mg/dL (11-17-18 @ 18:59)  POCT Blood Glucose.: 162 mg/dL (11-17-18 @ 17:56)  POCT Blood Glucose.: 136 mg/dL (11-17-18 @ 17:02)  POCT Blood Glucose.: 109 mg/dL (11-17-18 @ 16:01)  POCT Blood Glucose.: 108 mg/dL (11-17-18 @ 15:02)  POCT Blood Glucose.: 107 mg/dL (11-17-18 @ 13:56)  POCT Blood Glucose.: 123 mg/dL (11-17-18 @ 13:02)  POCT Blood Glucose.: 172 mg/dL (11-17-18 @ 12:04)  POCT Blood Glucose.: 257 mg/dL (11-17-18 @ 11:01)  POCT Blood Glucose.: 358 mg/dL (11-17-18 @ 09:58)  POCT Blood Glucose.: 426 mg/dL (11-17-18 @ 09:03)  POCT Blood Glucose.: 415 mg/dL (11-17-18 @ 08:07)  POCT Blood Glucose.: 382 mg/dL (11-17-18 @ 06:55)  POCT Blood Glucose.: 344 mg/dL (11-17-18 @ 06:53)  POCT Blood Glucose.: 187 mg/dL (11-17-18 @ 05:10)  POCT Blood Glucose.: 200 mg/dL (11-17-18 @ 04:13)  POCT Blood Glucose.: 278 mg/dL (11-17-18 @ 03:04)  POCT Blood Glucose.: 270 mg/dL (11-17-18 @ 02:08)  POCT Blood Glucose.: 258 mg/dL (11-17-18 @ 01:11)  POCT Blood Glucose.: 243 mg/dL (11-17-18 @ 00:22)  POCT Blood Glucose.: 201 mg/dL (11-16-18 @ 23:03)  POCT Blood Glucose.: 225 mg/dL (11-16-18 @ 22:04)  POCT Blood Glucose.: 353 mg/dL (11-16-18 @ 21:04)  POCT Blood Glucose.: 495 mg/dL (11-16-18 @ 20:09)  POCT Blood Glucose.: 470 mg/dL (11-16-18 @ 20:07)  POCT Blood Glucose.: >600 mg/dL (11-16-18 @ 18:45)                            9.2    4.3   )-----------( 249      ( 18 Nov 2018 06:19 )             27.9       11-18    136  |  95<L>  |  8   ----------------------------<  209<H>  3.9   |  24  |  2.54<H>    Ca    8.1<L>      18 Nov 2018 06:19  Phos  4.1     11-18  Mg     2.0     11-18    TPro  6.0  /  Alb  3.8  /  TBili  0.2  /  DBili  x   /  AST  20  /  ALT  11  /  AlkPhos  83  11-18      Thyroid Function Tests:  11-17 @ 08:25 TSH 0.71 FreeT4 -- T3 -- Anti TPO -- Anti Thyroglobulin Ab -- TSI --      Hemoglobin A1C, Whole Blood: 8.6 % <H> [4.0 - 5.6] (11-16-18 @ 20:14)  Hemoglobin A1C, Whole Blood: 9.1 % <H> [4.0 - 5.6] (10-16-18 @ 08:13)            Contact number: isabel [phone] or [phone]

## 2018-11-18 NOTE — PROGRESS NOTE ADULT - PROBLEM SELECTOR PLAN 1
-test BG AC/HS  -c/w Lantus 9 units QHS. Will time for 6pm tonight-pt received lantus at 4pm yesterday  -Increase Humalog 4 units w/meals. Pt eating >50g/carbs per meal  -c/w Humalog low correction scale AC and change to Low HS scale  -Will need to demonstrate teach back on how to use insulin pump prior to restarting therapy. Pt w/cognitive decline and must weight cost/benefit of continuing w/pump vs injections as patient will be high risk of DKA if missing basal insulin doses.   -f/u outpt w/Dr Pina Ley  discussed w/pt and medicine NP  pager: [phone]

## 2018-11-19 ENCOUNTER — TRANSCRIPTION ENCOUNTER (OUTPATIENT)
Age: 63
End: 2018-11-19

## 2018-11-19 VITALS
SYSTOLIC BLOOD PRESSURE: 174 MMHG | OXYGEN SATURATION: 99 % | HEART RATE: 81 BPM | DIASTOLIC BLOOD PRESSURE: 68 MMHG | TEMPERATURE: 98 F | RESPIRATION RATE: 18 BRPM

## 2018-11-19 DIAGNOSIS — Z46.81 ENCOUNTER FOR FITTING AND ADJUSTMENT OF INSULIN PUMP: ICD-10-CM

## 2018-11-19 LAB
GLUCOSE BLDC GLUCOMTR-MCNC: 145 MG/DL — HIGH (ref 70–99)
GLUCOSE BLDC GLUCOMTR-MCNC: 217 MG/DL — HIGH (ref 70–99)
GLUCOSE BLDC GLUCOMTR-MCNC: 224 MG/DL — HIGH (ref 70–99)
GLUCOSE BLDC GLUCOMTR-MCNC: 329 MG/DL — HIGH (ref 70–99)
GLUCOSE BLDC GLUCOMTR-MCNC: >600 MG/DL — CRITICAL HIGH (ref 70–99)

## 2018-11-19 PROCEDURE — 85014 HEMATOCRIT: CPT

## 2018-11-19 PROCEDURE — 71045 X-RAY EXAM CHEST 1 VIEW: CPT

## 2018-11-19 PROCEDURE — 83880 ASSAY OF NATRIURETIC PEPTIDE: CPT

## 2018-11-19 PROCEDURE — 87340 HEPATITIS B SURFACE AG IA: CPT

## 2018-11-19 PROCEDURE — 82947 ASSAY GLUCOSE BLOOD QUANT: CPT

## 2018-11-19 PROCEDURE — 83735 ASSAY OF MAGNESIUM: CPT

## 2018-11-19 PROCEDURE — 84132 ASSAY OF SERUM POTASSIUM: CPT

## 2018-11-19 PROCEDURE — 84100 ASSAY OF PHOSPHORUS: CPT

## 2018-11-19 PROCEDURE — 85730 THROMBOPLASTIN TIME PARTIAL: CPT

## 2018-11-19 PROCEDURE — 82553 CREATINE MB FRACTION: CPT

## 2018-11-19 PROCEDURE — 12345: CPT | Mod: NC

## 2018-11-19 PROCEDURE — 86709 HEPATITIS A IGM ANTIBODY: CPT

## 2018-11-19 PROCEDURE — 86803 HEPATITIS C AB TEST: CPT

## 2018-11-19 PROCEDURE — 82962 GLUCOSE BLOOD TEST: CPT

## 2018-11-19 PROCEDURE — 83605 ASSAY OF LACTIC ACID: CPT

## 2018-11-19 PROCEDURE — 93005 ELECTROCARDIOGRAM TRACING: CPT

## 2018-11-19 PROCEDURE — 84484 ASSAY OF TROPONIN QUANT: CPT

## 2018-11-19 PROCEDURE — 99291 CRITICAL CARE FIRST HOUR: CPT | Mod: 25

## 2018-11-19 PROCEDURE — 85610 PROTHROMBIN TIME: CPT

## 2018-11-19 PROCEDURE — 87581 M.PNEUMON DNA AMP PROBE: CPT

## 2018-11-19 PROCEDURE — 99233 SBSQ HOSP IP/OBS HIGH 50: CPT | Mod: GC

## 2018-11-19 PROCEDURE — 86704 HEP B CORE ANTIBODY TOTAL: CPT

## 2018-11-19 PROCEDURE — 84295 ASSAY OF SERUM SODIUM: CPT

## 2018-11-19 PROCEDURE — 96374 THER/PROPH/DIAG INJ IV PUSH: CPT

## 2018-11-19 PROCEDURE — 82330 ASSAY OF CALCIUM: CPT

## 2018-11-19 PROCEDURE — 82607 VITAMIN B-12: CPT

## 2018-11-19 PROCEDURE — 86705 HEP B CORE ANTIBODY IGM: CPT

## 2018-11-19 PROCEDURE — 82010 KETONE BODYS QUAN: CPT

## 2018-11-19 PROCEDURE — 82550 ASSAY OF CK (CPK): CPT

## 2018-11-19 PROCEDURE — 87486 CHLMYD PNEUM DNA AMP PROBE: CPT

## 2018-11-19 PROCEDURE — 86706 HEP B SURFACE ANTIBODY: CPT

## 2018-11-19 PROCEDURE — 83036 HEMOGLOBIN GLYCOSYLATED A1C: CPT

## 2018-11-19 PROCEDURE — 82746 ASSAY OF FOLIC ACID SERUM: CPT

## 2018-11-19 PROCEDURE — 82803 BLOOD GASES ANY COMBINATION: CPT

## 2018-11-19 PROCEDURE — 99261: CPT

## 2018-11-19 PROCEDURE — 80053 COMPREHEN METABOLIC PANEL: CPT

## 2018-11-19 PROCEDURE — 84443 ASSAY THYROID STIM HORMONE: CPT

## 2018-11-19 PROCEDURE — 85027 COMPLETE CBC AUTOMATED: CPT

## 2018-11-19 PROCEDURE — 82435 ASSAY OF BLOOD CHLORIDE: CPT

## 2018-11-19 PROCEDURE — 87633 RESP VIRUS 12-25 TARGETS: CPT

## 2018-11-19 PROCEDURE — 82565 ASSAY OF CREATININE: CPT

## 2018-11-19 PROCEDURE — 87798 DETECT AGENT NOS DNA AMP: CPT

## 2018-11-19 RX ORDER — OXYCODONE AND ACETAMINOPHEN 5; 325 MG/1; MG/1
1 TABLET ORAL ONCE
Qty: 0 | Refills: 0 | Status: DISCONTINUED | OUTPATIENT
Start: 2018-11-19 | End: 2018-11-19

## 2018-11-19 RX ORDER — INSULIN LISPRO 100/ML
1 VIAL (ML) SUBCUTANEOUS
Qty: 0 | Refills: 0 | Status: DISCONTINUED | OUTPATIENT
Start: 2018-11-19 | End: 2018-11-19

## 2018-11-19 RX ADMIN — Medication 81 MILLIGRAM(S): at 12:39

## 2018-11-19 RX ADMIN — Medication 4: at 08:39

## 2018-11-19 RX ADMIN — Medication 50 MILLIGRAM(S): at 18:56

## 2018-11-19 RX ADMIN — Medication 4 UNIT(S): at 12:38

## 2018-11-19 RX ADMIN — Medication 2: at 12:41

## 2018-11-19 RX ADMIN — OXYCODONE AND ACETAMINOPHEN 1 TABLET(S): 5; 325 TABLET ORAL at 03:55

## 2018-11-19 RX ADMIN — Medication 50 MILLIGRAM(S): at 05:20

## 2018-11-19 RX ADMIN — Medication 25 MILLIGRAM(S): at 08:45

## 2018-11-19 RX ADMIN — OXYCODONE AND ACETAMINOPHEN 1 TABLET(S): 5; 325 TABLET ORAL at 02:57

## 2018-11-19 RX ADMIN — Medication 4 UNIT(S): at 08:39

## 2018-11-19 NOTE — PROGRESS NOTE ADULT - SUBJECTIVE AND OBJECTIVE BOX
[facility]   [phone]  DIALYSIS NOTE  Chief Complaint: ESRD/Ongoing hemodialysis requirement. seen earlier     24 hour events/subjective:    states feels well and no new c/o are offered         ALLERGIES & MEDICATIONS  --------------------------------------------------------------------------------  Allergies    No Known Allergies    Intolerances      Standing Inpatient Medications  aspirin  chewable 81 milliGRAM(s) Oral daily  atorvastatin 20 milliGRAM(s) Oral at bedtime  dextrose 5%. 1000 milliLiter(s) IV Continuous <Continuous>  dextrose 50% Injectable 12.5 Gram(s) IV Push once  dextrose 50% Injectable 25 Gram(s) IV Push once  dextrose 50% Injectable 25 Gram(s) IV Push once  heparin  Injectable 5000 Unit(s) SubCutaneous every 8 hours  hydrALAZINE 50 milliGRAM(s) Oral every 8 hours  insulin lispro (HumaLOG) Pump 1 Each SubCutaneous Continuous Pump  metoprolol tartrate 25 milliGRAM(s) Oral two times a day  sodium chloride 0.9% Bolus 500 milliLiter(s) IV Bolus once    PRN Inpatient Medications  dextrose 40% Gel 15 Gram(s) Oral once PRN  glucagon  Injectable 1 milliGRAM(s) IntraMuscular once PRN      REVIEW OF SYSTEMS  --------------------------------------------------------------------------------    no fever or chill  no cp or palp   no sob or cough   no N/V/D/ no abd pain   ext no edema        VITALS/PHYSICAL EXAM  --------------------------------------------------------------------------------  T(C): 36.8 (11-19-18 @ 18:26), Max: 36.9 (11-19-18 @ 12:00)  HR: 81 (11-19-18 @ 18:26) (72 - 92)  BP: 174/68 (11-19-18 @ 18:26) (114/66 - 174/68)  RR: 18 (11-19-18 @ 18:26) (18 - 18)  SpO2: 99% (11-19-18 @ 18:26) (96% - 100%)  Wt(kg): --        11-18-18 @ 07:01  -  11-19-18 @ 07:00  --------------------------------------------------------  IN: 1420 mL / OUT: 0 mL / NET: 1420 mL    11-19-18 @ 07:01  -  11-19-18 @ 22:30  --------------------------------------------------------  IN: 0 mL / OUT: 2000 mL / NET: -2000 mL      Physical Exam:  		    	Gen: alert oriented place person and date   	Pulm: Decreased breath sounds b/l bases no rales or ronchi  	CV: RRR, S1/S2  	Abd: +BS, soft, nontender/nondistended  	Extremity: No cyanosis, no edema no clubbing  	  	    LABS/STUDIES  --------------------------------------------------------------------------------              9.2    4.3   >-----------<  249      [11-18-18 @ 06:19]              27.9     136  |  95  |  8   ----------------------------<  209      [11-18-18 @ 06:19]  3.9   |  24  |  2.54        Ca     8.1     [11-18-18 @ 06:19]      Mg     2.0     [11-18-18 @ 06:19]      Phos  4.1     [11-18-18 @ 06:19]    TPro  6.0  /  Alb  3.8  /  TBili  0.2  /  DBili  x   /  AST  20  /  ALT  11  /  AlkPhos  83  [11-18-18 @ 06:19]    PT/INR: PT 11.6 , INR 1.02       [11-18-18 @ 06:19]  PTT: 28.3       [11-18-18 @ 06:19]              imp/suggest: ESRD      Hemodialysis Prescription:  	Access:  	Dialyzer: revaclear   	Blood Flow (mL/Min): 400  	Dialysate Flow (mL/Min): 600  	Target UF (Liters):  	Treatment Time:  	Potassium:   	Calcium: 2.5  	  YOLANDA    Vitamin D     continue with hd   see hd flow sheet

## 2018-11-19 NOTE — ADVANCED PRACTICE NURSE CONSULT - RECOMMEDATIONS
HgA1C is improved and patient may be safer to resume pump as she has not done basal/bolus injections for years.  Pt able to demonstrate adequate return demo of insulin pump connection technique and verbalizes adequate understanding of bolusing for cabs/highs.  Insulin pump site on right abdomen and capped with infusion set cap.  Insulin pump suspended with no insulin (basal/bolus) administered and placed inside biohazard with pt's belongings by pt.  Pt last received Lantus 9 units at 4pm yesterday and can reconnect insulin pump at 6pm due to renal disease (reviewed with Julianne Gibson, Endocrine NP).  Dena (Primary RN) made aware pt can reconnect insulin pump at 6pm and that pump needs to be unsuspended and that insulin pump forms needs to be completed and signed by provider.  Endocrine team to follow.

## 2018-11-19 NOTE — PROGRESS NOTE ADULT - PROVIDER SPECIALTY LIST ADULT
Cardiology
Endocrinology
Endocrinology
Internal Medicine
Internal Medicine
MICU
Nephrology
Cardiology

## 2018-11-19 NOTE — PROGRESS NOTE ADULT - SUBJECTIVE AND OBJECTIVE BOX
Cardiovascular Disease Progress Note    Overnight events: No acute events overnight. feeling improved. denies any sx   Otherwise review of systems negative    Objective Findings:  T(C): 36.7 (11-18-18 @ 22:20), Max: 36.9 (11-18-18 @ 08:36)  HR: 92 (11-19-18 @ 05:20) (80 - 92)  BP: 121/66 (11-19-18 @ 05:20) (121/66 - 138/72)  RR: 18 (11-18-18 @ 22:20) (18 - 18)  SpO2: 97% (11-18-18 @ 22:20) (96% - 98%)  Wt(kg): --  Daily     Daily       Physical Exam:  Gen: NAD  HEENT: EOMI  CV: RRR, normal S1 + S2, no m/r/g  Lungs: CTAB  Abd: soft, non-tender  Ext: No edema      Laboratory Data:                        9.2    4.3   )-----------( 249      ( 18 Nov 2018 06:19 )             27.9     11-18    136  |  95<L>  |  8   ----------------------------<  209<H>  3.9   |  24  |  2.54<H>    Ca    8.1<L>      18 Nov 2018 06:19  Phos  4.1     11-18  Mg     2.0     11-18    TPro  6.0  /  Alb  3.8  /  TBili  0.2  /  DBili  x   /  AST  20  /  ALT  11  /  AlkPhos  83  11-18    PT/INR - ( 18 Nov 2018 06:19 )   PT: 11.6 sec;   INR: 1.02 ratio         PTT - ( 18 Nov 2018 06:19 )  PTT:28.3 sec          Inpatient Medications:  MEDICATIONS  (STANDING):  aspirin  chewable 81 milliGRAM(s) Oral daily  atorvastatin 20 milliGRAM(s) Oral at bedtime  dextrose 5%. 1000 milliLiter(s) (50 mL/Hr) IV Continuous <Continuous>  dextrose 50% Injectable 12.5 Gram(s) IV Push once  dextrose 50% Injectable 25 Gram(s) IV Push once  dextrose 50% Injectable 25 Gram(s) IV Push once  heparin  Injectable 5000 Unit(s) SubCutaneous every 8 hours  hydrALAZINE 50 milliGRAM(s) Oral every 8 hours  insulin glargine Injectable (LANTUS) 9 Unit(s) SubCutaneous <User Schedule>  insulin lispro (HumaLOG) corrective regimen sliding scale   SubCutaneous three times a day with meals  insulin lispro (HumaLOG) corrective regimen sliding scale   SubCutaneous at bedtime  insulin lispro Injectable (HumaLOG) 4 Unit(s) SubCutaneous three times a day before meals  metoprolol tartrate 25 milliGRAM(s) Oral two times a day  sodium chloride 0.9% Bolus 500 milliLiter(s) IV Bolus once      Assessment:  -DKA  -shortness of breath  -acute on chronic systolic HF  -cad s/p multiple stents  -esrd on hd  -pvd  -tia  -DKA    Recs:  -c/w insulin. anion gap closed  -sob resolved. likely multifactorial including volume overload and acid-gas disturbance in setting of dka. c/w volume removal woth hd  -nonspecific ekg changes relatively unchanged. denies any cardiac sx. will defer ischemic w/u for now  -c/w asa, statin, bb and hydralazine as tolerates  -hsq for dvt ppx  -dispo planning      Over 25 minutes spent on total encounter; more than 50% of the visit was spent counseling and/or coordinating care by the attending physician.      Julián Biswas MD   Cardiovascular Disease  [phone]

## 2018-11-19 NOTE — PROGRESS NOTE ADULT - ASSESSMENT
63 f with  DKA- off Insulin gtt, Endocrine follow. Restart Insulin pump.  observe off antibiotics  ESRD- continue HD. Nephrology follow Dr. Schmidt.  CAD, CHF- stable.  DC home if cleared by Endocrinology.  Follow with PMD/ Endocrinology/ Nephrology/ Cardiology in 3-4 days.  Pierce Viera MD pager [phone]

## 2018-11-19 NOTE — DISCHARGE NOTE ADULT - PROVIDER TOKENS
MAYA:'[id number]',MAYA:'[id number]' TOKEN:'[id number]',TOKESSENCE:'[id number]',MAYA:'[id number]'

## 2018-11-19 NOTE — PROGRESS NOTE ADULT - PROBLEM SELECTOR PLAN 2
Plan to restart pump today at settings noted. Patient reports she has supplies, including her humalog.

## 2018-11-19 NOTE — DISCHARGE NOTE ADULT - PLAN OF CARE
resolved Follow up with PCP within 3-5 days of discharge. Resume insulin pump with 3-5days of discharge. Resume HD at home site as ordered Follow up with cardiology Follow up with endocrinologist - Dr Pina Ley  within 3 days of discharge.  insulin pump resume prior to discharge.

## 2018-11-19 NOTE — DISCHARGE NOTE ADULT - MEDICATION SUMMARY - MEDICATIONS TO TAKE
I will START or STAY ON the medications listed below when I get home from the hospital:    aspirin 81 mg oral delayed release tablet  -- 1 tab(s) by mouth once a day  -- Indication: For CAD    insulin lispro 100 units/mL subcutaneous solution  -- as per  insulin pump setting(Insert pump at 8 PM tonight)  Humalog 3-4 units pre meals until then  -- Indication: For Diabetes mellitus of other type with ketoacidosis without coma    atorvastatin 20 mg oral tablet  -- 1 tab(s) by mouth once a day (at bedtime)  -- Indication: For Hyperlipidemia, unspecified hyperlipidemia type    metoprolol tartrate 25 mg oral tablet  -- 1 tab(s) by mouth 2 times a day  -- Indication: For HTN    hydrALAZINE 50 mg oral tablet  -- 1 tab(s) by mouth every 8 hours  -- Indication: For HTN

## 2018-11-19 NOTE — PROGRESS NOTE ADULT - ATTENDING COMMENTS
Pt Patient seen and examined. Agree with note as above with addendum: pt will be discharged after HD. Tried to make an appt with atif Parsons but the office was closed. Continue current settings. Will re-attach pump in an hour when HD is over and run a temp basal at 50% for 2 hours. RN will do a POC an hour after the pump is connected.   Paris Colbert MD  Pager: [phone]  Nights and Weekends: [phone]

## 2018-11-19 NOTE — PROGRESS NOTE ADULT - ASSESSMENT
62 yo F with T1DM c/b ESRD on HD, CAD, HFrEF, TIA, PVD, neuropathy with chf hyperkalemia, acidosis severe hyperglycemia    1- esrd        HD   avf  3.5 hr   2 k   2 liter fluid removal   revaclear 300 bfr 400cc  cont with hydralazine 50 q8

## 2018-11-19 NOTE — DISCHARGE NOTE ADULT - MEDICATION SUMMARY - MEDICATIONS TO STOP TAKING
I will STOP taking the medications listed below when I get home from the hospital:    clopidogrel 75 mg oral tablet  -- 1 tab(s) by mouth once a day (at bedtime)    cyclopentolate 2% ophthalmic solution  -- 1 drop(s) to each affected eye once a day    ketorolac 0.5% ophthalmic solution  -- 1 drop(s) to each affected eye 3 times a day    prednisoLONE acetate 1% ophthalmic suspension  -- 1 drop(s) to each affected eye 3 times a day    neomycin/polymyxin B/dexamethasone 3.5 mg-10,000 units-1 mg/mL ophthalmic suspension  -- 1 drop(s) to each affected eye every 8 hours

## 2018-11-19 NOTE — PROGRESS NOTE ADULT - PROBLEM SELECTOR PLAN 1
-test BG AC/HS  -Patient received last lantus dose 11/18 at 4pm. Will discontinue basal/bolus and plan to restart pump at 6pm today with settings as noted.  -Patient met with diabetes educator nurse and demonstrated teach back on how to use insulin pump.  Pt w/cognitive decline and must weight cost/benefit of continuing w/pump vs injections as patient will be high risk of DKA if missing basal insulin doses.   -f/u outpt w/Dr Pina Ley -test BG AC/HS  -Patient received last lantus dose 11/18 at 6pm. Will discontinue basal/bolus and plan -restarted at 5pm at a 50% temporary basal for 2hrs to avoid hypoglycemia. Will resume regular pump settings in 2 hrs.  -Patient met with diabetes educator nurse and demonstrated teach back on how to use insulin pump.  Pt w/cognitive decline and must weight cost/benefit of continuing w/pump vs injections as patient will be high risk of DKA if missing basal insulin doses.   -f/u outpt w/Dr Pina Ley

## 2018-11-19 NOTE — ADVANCED PRACTICE NURSE CONSULT - ASSESSMENT
62 y/o F with uncontrolled T1DM c/b ESRD on HD, neuropathy, and retinopathy, CAD, HFrEF, TIA, PVD, recent admission on Oct 13-17 for DKA, presents with SOB, admitted for management of DKA in MICU. Serum glucose 819 on admission with anion gap of 29, BHB 8.0, bicarb 17, lactate 2.4. HbA1c is 8.6%.   Pt uses the Medtronic 630g insulin pump with Humalog.  Pump reviewed and pt does not change site every 3 days.  Also does not bolus insulin for carb intake many times, boluses insulin for correction mostly.   Pt states that she counts carbs and boluses insulin for the carbs that she eats but this is not reflected on her insulin pump. Pt seen by our service many times in the past.   Pt Endo is Dr. Pina Ley.  Fbs 329 mg/dl this morning.   Pt tolerating PO intake now with good appetite. Pt off insulin pump at this time.  Zach Cast, SARAH, CDE requested reinforcement of pump education prior to restarting pump.   Pt has insulin pump supplies.  Pt able to demonstrate adequate return demo of insulin pump connection technique and verbalizes adequate understanding of bolusing for cabs/highs.  Insulin pump site on right abdomen and capped with infusion set cap.  Insulin pump suspended with no insulin (basal/bolus) administered and placed inside biohazard with pt's belongings by pt.  Pt last received Lantus 9 units at 4pm yesterday and can reconnect insulin pump at 6pm due to renal disease (reviewed with Julianne Gibson, Endocrine NP).  Dena (Primary RN) made aware pt can reconnect insulin pump at 6pm and that pump needs to be unsuspended and that insulin pump forms needs to be completed and signed by provider.  Pt currently on MDI with Lantus and Humalog until she resumes insulin pump.  Insulin pump settings as follows:     Basal   12am - 0.275 units/hours  4am - 0.375 units/hour  Total daily basal - 8.5 units     ICR  12am-12am - 15    ISF       12am - 60  7am - 40  6pm - 60    BGT  12am - 110-130  8am - 100-120    Insulin duration - 6 hours 62 y/o F with uncontrolled T1DM c/b ESRD on HD, neuropathy, and retinopathy, CAD, HFrEF, TIA, PVD, recent admission on Oct 13-17 for DKA, presents with SOB, admitted for management of DKA in MICU. Serum glucose 819 on admission with anion gap of 29, BHB 8.0, bicarb 17, lactate 2.4. HbA1c is 8.6%.   Pt uses the Medtronic 630g insulin pump with Humalog.  Pump reviewed and pt does not change site every 3 days.  Also does not bolus insulin for carb intake many times, boluses insulin for correction mostly.   Pt states that she counts carbs and boluses insulin for the carbs that she eats but this is not reflected on her insulin pump. Pt seen by our service many times in the past.   Pt Endo is Dr. Pina Ley.  Fbs 329 mg/dl this morning for which pt received 4 units meal bolus plus 4 units correction bolus.   Pt tolerating PO intake now with good appetite. Pt off insulin pump at this time.  Zach Cast, SARAH, CDE requested reinforcement of pump education prior to restarting pump.   Pt has insulin pump supplies.  Pt able to demonstrate adequate return demo of insulin pump connection technique and verbalizes adequate understanding of bolusing for cabs/highs.  Insulin pump site on right abdomen and capped with infusion set cap.  Insulin pump suspended with no insulin (basal/bolus) administered and placed inside biohazard with pt's belongings by pt.  Pt last received Lantus 9 units at 4pm yesterday and can reconnect insulin pump at 6pm due to renal disease (reviewed with Julianne Gibson, Endocrine NP).  Dena (Primary RN) made aware pt can reconnect insulin pump at 6pm and that pump needs to be unsuspended and that insulin pump forms needs to be completed and signed by provider.  Pt currently on MDI with Lantus and Humalog until she resumes insulin pump.  Insulin pump settings as follows:     Basal   12am - 0.275 units/hours  4am - 0.375 units/hour  Total daily basal - 8.5 units     ICR  12am-12am - 15    ISF       12am - 60  7am - 40  6pm - 60    BGT  12am - 110-130  8am - 100-120    Insulin duration - 6 hours

## 2018-11-19 NOTE — PROGRESS NOTE ADULT - SUBJECTIVE AND OBJECTIVE BOX
Chief Complaint: Type 1 DM f/u    History: Patient seen in HD. Reports feeling well. Eating well. Understands that she is restarting the pump today at 6pm. No current complaints.    MEDICATIONS  (STANDING):  aspirin  chewable 81 milliGRAM(s) Oral daily  atorvastatin 20 milliGRAM(s) Oral at bedtime  dextrose 5%. 1000 milliLiter(s) (50 mL/Hr) IV Continuous <Continuous>  dextrose 50% Injectable 12.5 Gram(s) IV Push once  dextrose 50% Injectable 25 Gram(s) IV Push once  dextrose 50% Injectable 25 Gram(s) IV Push once  heparin  Injectable 5000 Unit(s) SubCutaneous every 8 hours  hydrALAZINE 50 milliGRAM(s) Oral every 8 hours  insulin glargine Injectable (LANTUS) 9 Unit(s) SubCutaneous <User Schedule>  insulin lispro (HumaLOG) corrective regimen sliding scale   SubCutaneous three times a day with meals  insulin lispro (HumaLOG) corrective regimen sliding scale   SubCutaneous at bedtime  insulin lispro Injectable (HumaLOG) 4 Unit(s) SubCutaneous three times a day before meals  metoprolol tartrate 25 milliGRAM(s) Oral two times a day  sodium chloride 0.9% Bolus 500 milliLiter(s) IV Bolus once    MEDICATIONS  (PRN):  dextrose 40% Gel 15 Gram(s) Oral once PRN Blood Glucose LESS THAN 70 milliGRAM(s)/deciLiter  glucagon  Injectable 1 milliGRAM(s) IntraMuscular once PRN Glucose <70 milliGRAM(s)/deciLiter    Basal   12am - 0.275 units/hours  4am - 0.375 units/hour  Total daily basal - 8.5 units     ICR  12am-12am - 15    ISF       12am - 60  7am - 40  6pm - 60    BGT  12am - 110-130  8am - 100-120    Insulin duration - 6 hours      Allergies    No Known Allergies    Intolerances      PHYSICAL EXAM:  VITALS: T(C): 36.9 (11-19-18 @ 12:00)  T(F): 98.5 (11-19-18 @ 12:00), Max: 98.5 (11-19-18 @ 12:00)  HR: 75 (11-19-18 @ 12:00) (75 - 92)  BP: 114/66 (11-19-18 @ 12:00) (114/66 - 145/68)  RR:  (18 - 18)  SpO2:  (96% - 100%)  Wt(kg): --  GENERAL: NAD, well-groomed, well-developed  EYES: No proptosis, no lid lag, anicteric  HEENT:  Atraumatic, Normocephalic, moist mucous membranes  GI: Soft, nontender, non distended  SKIN: Dry, intact, No rashes or lesions  MUSCULOSKELETAL: Full range of motion, normal strength  PSYCH: Alert and oriented x 3, reactive affect    POCT Blood Glucose.: 217 mg/dL (11-19-18 @ 12:29)  POCT Blood Glucose.: 329 mg/dL (11-19-18 @ 08:21)  POCT Blood Glucose.: 152 mg/dL (11-18-18 @ 21:54)  POCT Blood Glucose.: 152 mg/dL (11-18-18 @ 17:27)  POCT Blood Glucose.: 236 mg/dL (11-18-18 @ 12:19)  POCT Blood Glucose.: 187 mg/dL (11-18-18 @ 08:27)  POCT Blood Glucose.: 289 mg/dL (11-17-18 @ 21:03)  POCT Blood Glucose.: 135 mg/dL (11-17-18 @ 18:59)  POCT Blood Glucose.: 162 mg/dL (11-17-18 @ 17:56)  POCT Blood Glucose.: 136 mg/dL (11-17-18 @ 17:02)  POCT Blood Glucose.: 109 mg/dL (11-17-18 @ 16:01)  POCT Blood Glucose.: 108 mg/dL (11-17-18 @ 15:02)  POCT Blood Glucose.: 107 mg/dL (11-17-18 @ 13:56)  POCT Blood Glucose.: 123 mg/dL (11-17-18 @ 13:02)  POCT Blood Glucose.: 172 mg/dL (11-17-18 @ 12:04)  POCT Blood Glucose.: 257 mg/dL (11-17-18 @ 11:01)  POCT Blood Glucose.: 358 mg/dL (11-17-18 @ 09:58)  POCT Blood Glucose.: 426 mg/dL (11-17-18 @ 09:03)  POCT Blood Glucose.: 415 mg/dL (11-17-18 @ 08:07)  POCT Blood Glucose.: 382 mg/dL (11-17-18 @ 06:55)  POCT Blood Glucose.: 344 mg/dL (11-17-18 @ 06:53)  POCT Blood Glucose.: 187 mg/dL (11-17-18 @ 05:10)  POCT Blood Glucose.: 200 mg/dL (11-17-18 @ 04:13)  POCT Blood Glucose.: 278 mg/dL (11-17-18 @ 03:04)  POCT Blood Glucose.: 270 mg/dL (11-17-18 @ 02:08)  POCT Blood Glucose.: 258 mg/dL (11-17-18 @ 01:11)  POCT Blood Glucose.: 243 mg/dL (11-17-18 @ 00:22)  POCT Blood Glucose.: 201 mg/dL (11-16-18 @ 23:03)  POCT Blood Glucose.: 225 mg/dL (11-16-18 @ 22:04)  POCT Blood Glucose.: 353 mg/dL (11-16-18 @ 21:04)  POCT Blood Glucose.: 495 mg/dL (11-16-18 @ 20:09)  POCT Blood Glucose.: 470 mg/dL (11-16-18 @ 20:07)  POCT Blood Glucose.: >600 mg/dL (11-16-18 @ 18:45)  POCT Blood Glucose.: >600 mg/dL (11-16-18 @ 17:36)      11-18    136  |  95<L>  |  8   ----------------------------<  209<H>  3.9   |  24  |  2.54<H>    EGFR if : 23<L>  EGFR if non : 20<L>    Ca    8.1<L>      11-18  Mg     2.0     11-18  Phos  4.1     11-18    TPro  6.0  /  Alb  3.8  /  TBili  0.2  /  DBili  x   /  AST  20  /  ALT  11  /  AlkPhos  83  11-18          Thyroid Function Tests:  11-17 @ 08:25 TSH 0.71 FreeT4 -- T3 -- Anti TPO -- Anti Thyroglobulin Ab -- TSI --      Hemoglobin A1C, Whole Blood: 8.6 % <H> [4.0 - 5.6] (11-16-18 @ 20:14)  Hemoglobin A1C, Whole Blood: 9.1 % <H> [4.0 - 5.6] (10-16-18 @ 08:13) Chief Complaint: Type 1 DM f/u    History: Patient seen in HD. Reports feeling well. Eating well. Understands that she is restarting the pump today at 6pm. No current complaints.    MEDICATIONS  (STANDING):  aspirin  chewable 81 milliGRAM(s) Oral daily  atorvastatin 20 milliGRAM(s) Oral at bedtime  dextrose 5%. 1000 milliLiter(s) (50 mL/Hr) IV Continuous <Continuous>  dextrose 50% Injectable 12.5 Gram(s) IV Push once  dextrose 50% Injectable 25 Gram(s) IV Push once  dextrose 50% Injectable 25 Gram(s) IV Push once  heparin  Injectable 5000 Unit(s) SubCutaneous every 8 hours  hydrALAZINE 50 milliGRAM(s) Oral every 8 hours  insulin glargine Injectable (LANTUS) 9 Unit(s) SubCutaneous <User Schedule>  insulin lispro (HumaLOG) corrective regimen sliding scale   SubCutaneous three times a day with meals  insulin lispro (HumaLOG) corrective regimen sliding scale   SubCutaneous at bedtime  insulin lispro Injectable (HumaLOG) 4 Unit(s) SubCutaneous three times a day before meals  metoprolol tartrate 25 milliGRAM(s) Oral two times a day  sodium chloride 0.9% Bolus 500 milliLiter(s) IV Bolus once    MEDICATIONS  (PRN):  dextrose 40% Gel 15 Gram(s) Oral once PRN Blood Glucose LESS THAN 70 milliGRAM(s)/deciLiter  glucagon  Injectable 1 milliGRAM(s) IntraMuscular once PRN Glucose <70 milliGRAM(s)/deciLiter    Basal   12am - 0.275 units/hours  4am - 0.375 units/hour  Total daily basal - 8.5 units     ICR  12am-12am - 15    ISF       12am - 60  7am - 40  6pm - 60    BGT  12am - 110-130  8am - 100-120    Insulin duration - 6 hours      Allergies  No Known Allergies        PHYSICAL EXAM:  VITALS: T(C): 36.9 (11-19-18 @ 12:00)  T(F): 98.5 (11-19-18 @ 12:00), Max: 98.5 (11-19-18 @ 12:00)  HR: 75 (11-19-18 @ 12:00) (75 - 92)  BP: 114/66 (11-19-18 @ 12:00) (114/66 - 145/68)  RR:  (18 - 18)  SpO2:  (96% - 100%)  Wt(kg): --  GENERAL: NAD, well-groomed, well-developed  EYES: No proptosis, anicteric  HEENT:  Atraumatic, Normocephalic, moist mucous membranes  GI: Soft, nontender, non distended  SKIN: Dry, intact, No rashes or lesions, +catheter in RLQ: no eyrthema/exudate  PSYCH: Alert and oriented x 3, reactive affect    POCT Blood Glucose.: 217 mg/dL (11-19-18 @ 12:29)  POCT Blood Glucose.: 329 mg/dL (11-19-18 @ 08:21)  POCT Blood Glucose.: 152 mg/dL (11-18-18 @ 21:54)  POCT Blood Glucose.: 152 mg/dL (11-18-18 @ 17:27)  POCT Blood Glucose.: 236 mg/dL (11-18-18 @ 12:19)  POCT Blood Glucose.: 187 mg/dL (11-18-18 @ 08:27)  POCT Blood Glucose.: 289 mg/dL (11-17-18 @ 21:03)  POCT Blood Glucose.: 135 mg/dL (11-17-18 @ 18:59)  POCT Blood Glucose.: 162 mg/dL (11-17-18 @ 17:56)  POCT Blood Glucose.: 136 mg/dL (11-17-18 @ 17:02)  POCT Blood Glucose.: 109 mg/dL (11-17-18 @ 16:01)  POCT Blood Glucose.: 108 mg/dL (11-17-18 @ 15:02)  POCT Blood Glucose.: 107 mg/dL (11-17-18 @ 13:56)  POCT Blood Glucose.: 123 mg/dL (11-17-18 @ 13:02)  POCT Blood Glucose.: 172 mg/dL (11-17-18 @ 12:04)  POCT Blood Glucose.: 257 mg/dL (11-17-18 @ 11:01)  POCT Blood Glucose.: 358 mg/dL (11-17-18 @ 09:58)  POCT Blood Glucose.: 426 mg/dL (11-17-18 @ 09:03)  POCT Blood Glucose.: 415 mg/dL (11-17-18 @ 08:07)  POCT Blood Glucose.: 382 mg/dL (11-17-18 @ 06:55)  POCT Blood Glucose.: 344 mg/dL (11-17-18 @ 06:53)  POCT Blood Glucose.: 187 mg/dL (11-17-18 @ 05:10)  POCT Blood Glucose.: 200 mg/dL (11-17-18 @ 04:13)  POCT Blood Glucose.: 278 mg/dL (11-17-18 @ 03:04)  POCT Blood Glucose.: 270 mg/dL (11-17-18 @ 02:08)  POCT Blood Glucose.: 258 mg/dL (11-17-18 @ 01:11)  POCT Blood Glucose.: 243 mg/dL (11-17-18 @ 00:22)  POCT Blood Glucose.: 201 mg/dL (11-16-18 @ 23:03)  POCT Blood Glucose.: 225 mg/dL (11-16-18 @ 22:04)  POCT Blood Glucose.: 353 mg/dL (11-16-18 @ 21:04)  POCT Blood Glucose.: 495 mg/dL (11-16-18 @ 20:09)  POCT Blood Glucose.: 470 mg/dL (11-16-18 @ 20:07)  POCT Blood Glucose.: >600 mg/dL (11-16-18 @ 18:45)  POCT Blood Glucose.: >600 mg/dL (11-16-18 @ 17:36)      11-18    136  |  95<L>  |  8   ----------------------------<  209<H>  3.9   |  24  |  2.54<H>    EGFR if : 23<L>  EGFR if non : 20<L>    Ca    8.1<L>      11-18  Mg     2.0     11-18  Phos  4.1     11-18    TPro  6.0  /  Alb  3.8  /  TBili  0.2  /  DBili  x   /  AST  20  /  ALT  11  /  AlkPhos  83  11-18          Thyroid Function Tests:  11-17 @ 08:25 TSH 0.71     Hemoglobin A1C, Whole Blood: 8.6 % <H> [4.0 - 5.6] (11-16-18 @ 20:14)  Hemoglobin A1C, Whole Blood: 9.1 % <H> [4.0 - 5.6] (10-16-18 @ 08:13)

## 2018-11-19 NOTE — DISCHARGE NOTE ADULT - HOSPITAL COURSE
63 year old female  with PMHX T1DM c/b ESRD on HD, CAD, HFrEF, TIA, PVD, neuropathy, frequent  DKA admitted with DKA and acute on chronic CHF. Patient was admitted to the MICU for DKA for insulin management. Cardiology consulted for  Pro-bnp >70, 000 and trops 150s; cardiology planning to do outpatient nuclear stress test and deferring ischemic work up inpatient.   DKA resolved,  patient to resume insulin pump after 6pm per endocrine recommendations. patient stable for discharge with outpatient follow up

## 2018-11-19 NOTE — DISCHARGE NOTE ADULT - CARE PLAN
Principal Discharge DX:	DKA (diabetic ketoacidoses)  Goal:	resolved  Assessment and plan of treatment:	Follow up with PCP within 3-5 days of discharge. Resume insulin pump with 3-5days of discharge.  Secondary Diagnosis:	ESRD (end stage renal disease) on dialysis  Goal:	Resume HD at home site as ordered  Secondary Diagnosis:	CHF (congestive heart failure)  Goal:	Follow up with cardiology Principal Discharge DX:	DKA (diabetic ketoacidoses)  Goal:	resolved  Assessment and plan of treatment:	Follow up with endocrinologist - Dr Pina Ley  within 3 days of discharge.  insulin pump resume prior to discharge.  Secondary Diagnosis:	ESRD (end stage renal disease) on dialysis  Goal:	Resume HD at home site as ordered  Secondary Diagnosis:	CHF (congestive heart failure)  Goal:	Follow up with cardiology

## 2018-11-19 NOTE — DISCHARGE NOTE ADULT - PATIENT PORTAL LINK FT
You can access the [facility] Patient Portal, offered by [facility], by registering with the following website: [email or web address]

## 2018-11-19 NOTE — DISCHARGE NOTE ADULT - CARE PROVIDER_API CALL
Tee Biswas), Cardiovascular Disease; Internal Medicine  [address] [place] [address]  Phone: [phone]  Fax: [phone]    Daisy Burger (), Nephrology  [address] [address]  Phone: [phone]  Fax: [phone] Tee Biswas), Cardiovascular Disease; Internal Medicine  [address] [place] [address]  Phone: [phone]  Fax: [phone]    Daisy Burger (DO), Nephrology  [address] [address]  Phone: [phone]  Fax: [phone]    Pina Ley), EndocrinologyMetabDiabetes  [address] [place] [address]  Phone: [phone]  Fax: [phone]

## 2018-11-19 NOTE — DISCHARGE NOTE ADULT - ADDITIONAL INSTRUCTIONS
Follow up with endocrinology, cardiology and PCP within 3-5days of discharge  Nutrition consult Follow up with endocrinology, cardiology and PCP within 3 days of discharge  Nutrition consult

## 2018-11-19 NOTE — PROGRESS NOTE ADULT - SUBJECTIVE AND OBJECTIVE BOX
Patient is a 63y old  Female who presents with a chief complaint of DKA (19 Nov 2018 15:24)      SUBJECTIVE / OVERNIGHT EVENTS: Comfortable without new complaints.   Review of Systems  chest pain no  palpitations no  sob no  nausea no  headache no    MEDICATIONS  (STANDING):  aspirin  chewable 81 milliGRAM(s) Oral daily  atorvastatin 20 milliGRAM(s) Oral at bedtime  dextrose 5%. 1000 milliLiter(s) (50 mL/Hr) IV Continuous <Continuous>  dextrose 50% Injectable 12.5 Gram(s) IV Push once  dextrose 50% Injectable 25 Gram(s) IV Push once  dextrose 50% Injectable 25 Gram(s) IV Push once  heparin  Injectable 5000 Unit(s) SubCutaneous every 8 hours  hydrALAZINE 50 milliGRAM(s) Oral every 8 hours  insulin lispro (HumaLOG) Pump 1 Each SubCutaneous Continuous Pump  metoprolol tartrate 25 milliGRAM(s) Oral two times a day  sodium chloride 0.9% Bolus 500 milliLiter(s) IV Bolus once    MEDICATIONS  (PRN):  dextrose 40% Gel 15 Gram(s) Oral once PRN Blood Glucose LESS THAN 70 milliGRAM(s)/deciLiter  glucagon  Injectable 1 milliGRAM(s) IntraMuscular once PRN Glucose <70 milliGRAM(s)/deciLiter      Vital Signs Last 24 Hrs  T(C): 36.7 (19 Nov 2018 17:05), Max: 36.9 (19 Nov 2018 12:00)  T(F): 98 (19 Nov 2018 17:05), Max: 98.5 (19 Nov 2018 12:00)  HR: 72 (19 Nov 2018 17:05) (72 - 92)  BP: 137/66 (19 Nov 2018 17:05) (114/66 - 145/68)  BP(mean): --  RR: 18 (19 Nov 2018 17:05) (18 - 18)  SpO2: 98% (19 Nov 2018 17:05) (96% - 100%)    PHYSICAL EXAM:  GENERAL: NAD, well-developed  HEAD:  Atraumatic, Normocephalic  EYES: EOMI, PERRLA, conjunctiva and sclera clear  NECK: Supple, No JVD  CHEST/LUNG: Clear to auscultation bilaterally; No wheeze  HEART: Regular rate and rhythm; No murmurs, rubs, or gallops  ABDOMEN: Soft, Nontender, Nondistended; Bowel sounds present  EXTREMITIES:  2+ Peripheral Pulses, No clubbing, cyanosis, or edema  PSYCH: AAOx3  NEUROLOGY: non-focal  SKIN: No rashes or lesions    LABS:                        9.2    4.3   )-----------( 249      ( 18 Nov 2018 06:19 )             27.9     11-18    136  |  95<L>  |  8   ----------------------------<  209<H>  3.9   |  24  |  2.54<H>    Ca    8.1<L>      18 Nov 2018 06:19  Phos  4.1     11-18  Mg     2.0     11-18    TPro  6.0  /  Alb  3.8  /  TBili  0.2  /  DBili  x   /  AST  20  /  ALT  11  /  AlkPhos  83  11-18    PT/INR - ( 18 Nov 2018 06:19 )   PT: 11.6 sec;   INR: 1.02 ratio         PTT - ( 18 Nov 2018 06:19 )  PTT:28.3 sec            RADIOLOGY & ADDITIONAL TESTS:    Imaging Personally Reviewed:    Consultant(s) Notes Reviewed:      Care Discussed with Consultants/Other Providers:

## 2018-11-19 NOTE — PROGRESS NOTE ADULT - ASSESSMENT
64 y/o F with uncontrolled T1DM c/b ESRD on HD, neuropathy, and retinopathy, CAD, HFrEF, TIA, PVD, admitted with DKA in setting of inappropriate insulin pump use.

## 2018-11-30 ENCOUNTER — INPATIENT (INPATIENT)
Facility: HOSPITAL | Age: 63
LOS: 1 days | Discharge: HOME | DRG: 637 | End: 2018-12-02
Attending: INTERNAL MEDICINE | Admitting: INTERNAL MEDICINE
Payer: MEDICARE

## 2018-11-30 VITALS
WEIGHT: 110.01 LBS | OXYGEN SATURATION: 98 % | HEART RATE: 76 BPM | SYSTOLIC BLOOD PRESSURE: 153 MMHG | DIASTOLIC BLOOD PRESSURE: 77 MMHG | HEIGHT: 61 IN | RESPIRATION RATE: 8 BRPM

## 2018-11-30 DIAGNOSIS — Z98.89 OTHER SPECIFIED POSTPROCEDURAL STATES: Chronic | ICD-10-CM

## 2018-11-30 DIAGNOSIS — E78.5 HYPERLIPIDEMIA, UNSPECIFIED: ICD-10-CM

## 2018-11-30 DIAGNOSIS — E10.65 TYPE 1 DIABETES MELLITUS WITH HYPERGLYCEMIA: ICD-10-CM

## 2018-11-30 DIAGNOSIS — R73.9 HYPERGLYCEMIA, UNSPECIFIED: ICD-10-CM

## 2018-11-30 DIAGNOSIS — I10 ESSENTIAL (PRIMARY) HYPERTENSION: ICD-10-CM

## 2018-11-30 LAB
ALBUMIN SERPL ELPH-MCNC: 4.6 G/DL — SIGNIFICANT CHANGE UP (ref 3.3–5)
ALP SERPL-CCNC: 92 U/L — SIGNIFICANT CHANGE UP (ref 40–120)
ALT FLD-CCNC: 28 U/L — SIGNIFICANT CHANGE UP (ref 10–45)
ANION GAP SERPL CALC-SCNC: 28 MMOL/L — HIGH (ref 5–17)
ANION GAP SERPL CALC-SCNC: 33 MMOL/L — HIGH (ref 5–17)
APTT BLD: 28.9 SEC — SIGNIFICANT CHANGE UP (ref 27.5–36.3)
AST SERPL-CCNC: 56 U/L — HIGH (ref 10–40)
B-OH-BUTYR SERPL-SCNC: 5.4 MMOL/L — HIGH
BASOPHILS # BLD AUTO: 0 K/UL — SIGNIFICANT CHANGE UP (ref 0–0.2)
BASOPHILS NFR BLD AUTO: 0.4 % — SIGNIFICANT CHANGE UP (ref 0–2)
BILIRUB SERPL-MCNC: 0.3 MG/DL — SIGNIFICANT CHANGE UP (ref 0.2–1.2)
BUN SERPL-MCNC: 28 MG/DL — HIGH (ref 7–23)
BUN SERPL-MCNC: 30 MG/DL — HIGH (ref 7–23)
CALCIUM SERPL-MCNC: 9 MG/DL — SIGNIFICANT CHANGE UP (ref 8.4–10.5)
CALCIUM SERPL-MCNC: 9 MG/DL — SIGNIFICANT CHANGE UP (ref 8.4–10.5)
CHLORIDE SERPL-SCNC: 84 MMOL/L — LOW (ref 96–108)
CHLORIDE SERPL-SCNC: 84 MMOL/L — LOW (ref 96–108)
CO2 SERPL-SCNC: 19 MMOL/L — LOW (ref 22–31)
CO2 SERPL-SCNC: 22 MMOL/L — SIGNIFICANT CHANGE UP (ref 22–31)
CREAT SERPL-MCNC: 4.75 MG/DL — HIGH (ref 0.5–1.3)
CREAT SERPL-MCNC: 4.91 MG/DL — HIGH (ref 0.5–1.3)
EOSINOPHIL # BLD AUTO: 0 K/UL — SIGNIFICANT CHANGE UP (ref 0–0.5)
EOSINOPHIL NFR BLD AUTO: 0.4 % — SIGNIFICANT CHANGE UP (ref 0–6)
GAS PNL BLDV: SIGNIFICANT CHANGE UP
GLUCOSE BLDC GLUCOMTR-MCNC: 104 MG/DL — HIGH (ref 70–99)
GLUCOSE BLDC GLUCOMTR-MCNC: 113 MG/DL — HIGH (ref 70–99)
GLUCOSE BLDC GLUCOMTR-MCNC: 135 MG/DL — HIGH (ref 70–99)
GLUCOSE BLDC GLUCOMTR-MCNC: 185 MG/DL — HIGH (ref 70–99)
GLUCOSE SERPL-MCNC: 374 MG/DL — HIGH (ref 70–99)
GLUCOSE SERPL-MCNC: 385 MG/DL — HIGH (ref 70–99)
HCT VFR BLD CALC: 32.9 % — LOW (ref 34.5–45)
HGB BLD-MCNC: 11 G/DL — LOW (ref 11.5–15.5)
INR BLD: 1.01 RATIO — SIGNIFICANT CHANGE UP (ref 0.88–1.16)
LYMPHOCYTES # BLD AUTO: 0.9 K/UL — LOW (ref 1–3.3)
LYMPHOCYTES # BLD AUTO: 8.7 % — LOW (ref 13–44)
MCHC RBC-ENTMCNC: 33.2 PG — SIGNIFICANT CHANGE UP (ref 27–34)
MCHC RBC-ENTMCNC: 33.5 GM/DL — SIGNIFICANT CHANGE UP (ref 32–36)
MCV RBC AUTO: 99 FL — SIGNIFICANT CHANGE UP (ref 80–100)
MONOCYTES # BLD AUTO: 0.6 K/UL — SIGNIFICANT CHANGE UP (ref 0–0.9)
MONOCYTES NFR BLD AUTO: 5.9 % — SIGNIFICANT CHANGE UP (ref 2–14)
NEUTROPHILS # BLD AUTO: 8.8 K/UL — HIGH (ref 1.8–7.4)
NEUTROPHILS NFR BLD AUTO: 84.6 % — HIGH (ref 43–77)
PLATELET # BLD AUTO: 270 K/UL — SIGNIFICANT CHANGE UP (ref 150–400)
POTASSIUM SERPL-MCNC: 5 MMOL/L — SIGNIFICANT CHANGE UP (ref 3.5–5.3)
POTASSIUM SERPL-MCNC: 6.2 MMOL/L — CRITICAL HIGH (ref 3.5–5.3)
POTASSIUM SERPL-SCNC: 5 MMOL/L — SIGNIFICANT CHANGE UP (ref 3.5–5.3)
POTASSIUM SERPL-SCNC: 6.2 MMOL/L — CRITICAL HIGH (ref 3.5–5.3)
PROT SERPL-MCNC: 7.8 G/DL — SIGNIFICANT CHANGE UP (ref 6–8.3)
PROTHROM AB SERPL-ACNC: 11.6 SEC — SIGNIFICANT CHANGE UP (ref 10–12.9)
RAPID RVP RESULT: SIGNIFICANT CHANGE UP
RBC # BLD: 3.32 M/UL — LOW (ref 3.8–5.2)
RBC # FLD: 12.9 % — SIGNIFICANT CHANGE UP (ref 10.3–14.5)
SODIUM SERPL-SCNC: 134 MMOL/L — LOW (ref 135–145)
SODIUM SERPL-SCNC: 136 MMOL/L — SIGNIFICANT CHANGE UP (ref 135–145)
WBC # BLD: 10.4 K/UL — SIGNIFICANT CHANGE UP (ref 3.8–10.5)
WBC # FLD AUTO: 10.4 K/UL — SIGNIFICANT CHANGE UP (ref 3.8–10.5)

## 2018-11-30 PROCEDURE — 93010 ELECTROCARDIOGRAM REPORT: CPT | Mod: GC

## 2018-11-30 PROCEDURE — 71045 X-RAY EXAM CHEST 1 VIEW: CPT | Mod: 26

## 2018-11-30 PROCEDURE — 99223 1ST HOSP IP/OBS HIGH 75: CPT | Mod: GC

## 2018-11-30 PROCEDURE — 99285 EMERGENCY DEPT VISIT HI MDM: CPT | Mod: GC,25

## 2018-11-30 PROCEDURE — 99223 1ST HOSP IP/OBS HIGH 75: CPT

## 2018-11-30 RX ORDER — SEVELAMER CARBONATE 2400 MG/1
2400 POWDER, FOR SUSPENSION ORAL
Qty: 0 | Refills: 0 | Status: DISCONTINUED | OUTPATIENT
Start: 2018-11-30 | End: 2018-12-02

## 2018-11-30 RX ORDER — DEXTROSE 50 % IN WATER 50 %
25 SYRINGE (ML) INTRAVENOUS ONCE
Qty: 0 | Refills: 0 | Status: DISCONTINUED | OUTPATIENT
Start: 2018-11-30 | End: 2018-12-02

## 2018-11-30 RX ORDER — CINACALCET 30 MG/1
60 TABLET, FILM COATED ORAL DAILY
Qty: 0 | Refills: 0 | Status: DISCONTINUED | OUTPATIENT
Start: 2018-11-30 | End: 2018-12-02

## 2018-11-30 RX ORDER — INSULIN LISPRO 100/ML
4 VIAL (ML) SUBCUTANEOUS ONCE
Qty: 0 | Refills: 0 | Status: COMPLETED | OUTPATIENT
Start: 2018-11-30 | End: 2018-11-30

## 2018-11-30 RX ORDER — ASPIRIN/CALCIUM CARB/MAGNESIUM 324 MG
81 TABLET ORAL DAILY
Qty: 0 | Refills: 0 | Status: DISCONTINUED | OUTPATIENT
Start: 2018-11-30 | End: 2018-12-02

## 2018-11-30 RX ORDER — INSULIN LISPRO 100/ML
VIAL (ML) SUBCUTANEOUS EVERY 6 HOURS
Qty: 0 | Refills: 0 | Status: DISCONTINUED | OUTPATIENT
Start: 2018-11-30 | End: 2018-11-30

## 2018-11-30 RX ORDER — CLOPIDOGREL BISULFATE 75 MG/1
75 TABLET, FILM COATED ORAL DAILY
Qty: 0 | Refills: 0 | Status: DISCONTINUED | OUTPATIENT
Start: 2018-11-30 | End: 2018-12-02

## 2018-11-30 RX ORDER — INSULIN HUMAN 100 [IU]/ML
INJECTION, SOLUTION SUBCUTANEOUS EVERY 6 HOURS
Qty: 0 | Refills: 0 | Status: DISCONTINUED | OUTPATIENT
Start: 2018-11-30 | End: 2018-11-30

## 2018-11-30 RX ORDER — SODIUM CHLORIDE 9 MG/ML
1000 INJECTION, SOLUTION INTRAVENOUS
Qty: 0 | Refills: 0 | Status: DISCONTINUED | OUTPATIENT
Start: 2018-11-30 | End: 2018-12-02

## 2018-11-30 RX ORDER — DEXTROSE 50 % IN WATER 50 %
15 SYRINGE (ML) INTRAVENOUS ONCE
Qty: 0 | Refills: 0 | Status: DISCONTINUED | OUTPATIENT
Start: 2018-11-30 | End: 2018-12-02

## 2018-11-30 RX ORDER — INSULIN LISPRO 100/ML
VIAL (ML) SUBCUTANEOUS AT BEDTIME
Qty: 0 | Refills: 0 | Status: DISCONTINUED | OUTPATIENT
Start: 2018-11-30 | End: 2018-12-02

## 2018-11-30 RX ORDER — DULOXETINE HYDROCHLORIDE 30 MG/1
60 CAPSULE, DELAYED RELEASE ORAL DAILY
Qty: 0 | Refills: 0 | Status: DISCONTINUED | OUTPATIENT
Start: 2018-11-30 | End: 2018-12-02

## 2018-11-30 RX ORDER — LISINOPRIL 2.5 MG/1
40 TABLET ORAL DAILY
Qty: 0 | Refills: 0 | Status: DISCONTINUED | OUTPATIENT
Start: 2018-11-30 | End: 2018-12-02

## 2018-11-30 RX ORDER — GLUCAGON INJECTION, SOLUTION 0.5 MG/.1ML
1 INJECTION, SOLUTION SUBCUTANEOUS ONCE
Qty: 0 | Refills: 0 | Status: DISCONTINUED | OUTPATIENT
Start: 2018-11-30 | End: 2018-12-02

## 2018-11-30 RX ORDER — DEXTROSE 50 % IN WATER 50 %
12.5 SYRINGE (ML) INTRAVENOUS ONCE
Qty: 0 | Refills: 0 | Status: DISCONTINUED | OUTPATIENT
Start: 2018-11-30 | End: 2018-12-02

## 2018-11-30 RX ORDER — HYDRALAZINE HCL 50 MG
100 TABLET ORAL EVERY 8 HOURS
Qty: 0 | Refills: 0 | Status: DISCONTINUED | OUTPATIENT
Start: 2018-11-30 | End: 2018-12-02

## 2018-11-30 RX ORDER — INSULIN GLARGINE 100 [IU]/ML
9 INJECTION, SOLUTION SUBCUTANEOUS AT BEDTIME
Qty: 0 | Refills: 0 | Status: DISCONTINUED | OUTPATIENT
Start: 2018-11-30 | End: 2018-11-30

## 2018-11-30 RX ORDER — INSULIN LISPRO 100/ML
5 VIAL (ML) SUBCUTANEOUS ONCE
Qty: 0 | Refills: 0 | Status: COMPLETED | OUTPATIENT
Start: 2018-11-30 | End: 2018-11-30

## 2018-11-30 RX ORDER — INSULIN LISPRO 100/ML
VIAL (ML) SUBCUTANEOUS
Qty: 0 | Refills: 0 | Status: DISCONTINUED | OUTPATIENT
Start: 2018-11-30 | End: 2018-12-01

## 2018-11-30 RX ORDER — INSULIN LISPRO 100/ML
4 VIAL (ML) SUBCUTANEOUS
Qty: 0 | Refills: 0 | Status: DISCONTINUED | OUTPATIENT
Start: 2018-11-30 | End: 2018-12-01

## 2018-11-30 RX ORDER — SODIUM CHLORIDE 9 MG/ML
500 INJECTION, SOLUTION INTRAVENOUS ONCE
Qty: 0 | Refills: 0 | Status: COMPLETED | OUTPATIENT
Start: 2018-11-30 | End: 2018-11-30

## 2018-11-30 RX ORDER — METOPROLOL TARTRATE 50 MG
50 TABLET ORAL DAILY
Qty: 0 | Refills: 0 | Status: DISCONTINUED | OUTPATIENT
Start: 2018-11-30 | End: 2018-12-02

## 2018-11-30 RX ORDER — OXYCODONE AND ACETAMINOPHEN 5; 325 MG/1; MG/1
1 TABLET ORAL EVERY 8 HOURS
Qty: 0 | Refills: 0 | Status: DISCONTINUED | OUTPATIENT
Start: 2018-11-30 | End: 2018-12-02

## 2018-11-30 RX ORDER — INSULIN GLARGINE 100 [IU]/ML
9 INJECTION, SOLUTION SUBCUTANEOUS
Qty: 0 | Refills: 0 | Status: DISCONTINUED | OUTPATIENT
Start: 2018-12-01 | End: 2018-12-01

## 2018-11-30 RX ORDER — INSULIN GLARGINE 100 [IU]/ML
9 INJECTION, SOLUTION SUBCUTANEOUS ONCE
Qty: 0 | Refills: 0 | Status: COMPLETED | OUTPATIENT
Start: 2018-11-30 | End: 2018-11-30

## 2018-11-30 RX ORDER — ATORVASTATIN CALCIUM 80 MG/1
20 TABLET, FILM COATED ORAL AT BEDTIME
Qty: 0 | Refills: 0 | Status: DISCONTINUED | OUTPATIENT
Start: 2018-11-30 | End: 2018-12-02

## 2018-11-30 RX ORDER — HEPARIN SODIUM 5000 [USP'U]/ML
5000 INJECTION INTRAVENOUS; SUBCUTANEOUS EVERY 12 HOURS
Qty: 0 | Refills: 0 | Status: DISCONTINUED | OUTPATIENT
Start: 2018-11-30 | End: 2018-12-02

## 2018-11-30 RX ADMIN — Medication 5 UNIT(S): at 12:05

## 2018-11-30 RX ADMIN — Medication 100 MILLIGRAM(S): at 23:48

## 2018-11-30 RX ADMIN — Medication 50 MILLIGRAM(S): at 18:37

## 2018-11-30 RX ADMIN — LISINOPRIL 40 MILLIGRAM(S): 2.5 TABLET ORAL at 18:37

## 2018-11-30 RX ADMIN — SODIUM CHLORIDE 500 MILLILITER(S): 9 INJECTION, SOLUTION INTRAVENOUS at 14:30

## 2018-11-30 RX ADMIN — ATORVASTATIN CALCIUM 20 MILLIGRAM(S): 80 TABLET, FILM COATED ORAL at 23:48

## 2018-11-30 RX ADMIN — INSULIN GLARGINE 9 UNIT(S): 100 INJECTION, SOLUTION SUBCUTANEOUS at 13:55

## 2018-11-30 RX ADMIN — Medication 4 UNIT(S): at 13:55

## 2018-11-30 NOTE — CONSULT NOTE ADULT - ATTENDING COMMENTS
I have examined pt and agree with above exam and plan. 62 y/o female with IDDM on insulin pump in mild DKA. Glucose decreasing after iv insulin given. Insulin pump removed in ED.  Unclear etiology of  DKA. Pt admitted multiple times for DKA.  No clear signs of infection.    Pt with ESRD on dialysis. Pt lying flat in bed. On bedside chest ultrasound no b lines and A lines throughout both lung fields.     A/P Mild DKA  of unclear etiology . Despite  having ESRD pt needs fluids to help with DKA. She has room for fluids.. No t in chf.    Pt is not an MICU candidate at this time.

## 2018-11-30 NOTE — ED ADULT NURSE REASSESSMENT NOTE - NS ED NURSE REASSESS COMMENT FT1
Per ED MD Ramos S patient to take insulin pump off. FS completed. Insulin pump taken off at this time. Safety and comfort measures provided.

## 2018-11-30 NOTE — CONSULT NOTE ADULT - ASSESSMENT
64yo F h/o DM1 (has insulin pump, non-adherent) w frequent hospitalizations for DKA, ESRD on HD (LUE AVF, M/T/Th/Sa, last HD yesterday morning), CAD s/p  stents, HTN HLD, CVA, PVD presents from home w chills, weakness, fatigue, likely viral illness with hyperglycemia (high risk patient with high level decision-making).

## 2018-11-30 NOTE — CONSULT NOTE ADULT - PROBLEM SELECTOR RECOMMENDATION 9
Diabetes Education and Nutrition Eval  Given hyperglycemia and risk of progressing to DKA would start MDI. Give Lantus 9 units STAT remove the pump in 2 hours. Monitor for now on Lantus 9 units with   low correction scale q6 while NPO until mild DKA resolves. Once eating recommend Humalog 4 units with meals.   Goal glucose 100-200. No need for tight glycemic control given hypoglycemic unawareness.   Outpt. endo follow-up w/ Dr. Ley  Outpt. optho, podiatry, nephrology  Plan to d/c on her pump.

## 2018-11-30 NOTE — ED PROVIDER NOTE - MEDICAL DECISION MAKING DETAILS
weakness, fatigue, elevated FSG in 300s. Pt on insulin pump at baseline. Will get labs, RVP, reassess. Concern for possible Viral URI, concern for DKA.

## 2018-11-30 NOTE — H&P ADULT - HISTORY OF PRESENT ILLNESS
63 YOF CAD, CHF, HD MTThSat, last had dialysis yesterday 2L off which was normal for pt. Pt states she has been feeling unwell with nasal congestion, slight cough and chills since yesterday night. Pt denies any abdominal pain, denies fever, denies chest pain, denies sore throat. Pt has medtronic insulin pump basal rate 1.8u/hr

## 2018-11-30 NOTE — H&P ADULT - NSHPLABSRESULTS_GEN_ALL_CORE
11.0   10.4  )-----------( 270      ( 30 Nov 2018 11:12 )             32.9       11-30    136  |  84<L>  |  30<H>  ----------------------------<  385<H>  5.0   |  19<L>  |  4.91<H>    Ca    9.0      30 Nov 2018 13:32    TPro  7.8  /  Alb  4.6  /  TBili  0.3  /  DBili  x   /  AST  56<H>  /  ALT  28  /  AlkPhos  92  11-30                  PT/INR - ( 30 Nov 2018 11:12 )   PT: 11.6 sec;   INR: 1.01 ratio         PTT - ( 30 Nov 2018 11:12 )  PTT:28.9 sec    Lactate Trend            CAPILLARY BLOOD GLUCOSE      POCT Blood Glucose.: 135 mg/dL (30 Nov 2018 15:48)    < from: Xray Chest 1 View- PORTABLE-Urgent (11.30.18 @ 11:58) >    ssion: Clear lungs.    < end of copied text >    EKG SR NSST/T

## 2018-11-30 NOTE — CONSULT NOTE ADULT - SUBJECTIVE AND OBJECTIVE BOX
HPI:  64 y/o F w/ hx of DM Type 1 x 44 years. Complicated by nephropathy with ESRD on HD (last HD yesterday), neuropathy, retinopathy, macrovascular complications of CAD. A1c 9.1%. On medtronic insulin pump for the past 4 years with settings as listed below. Changes sites every 3 days. Does not have supplies with her, but her  will be bringing in supplies shortly. Uses bolus wizard. Checks glucose 5 x/day with Junior Contour glucometer. Values mostly 150's-190's, but recent hyperglycemia for the past 24 hours with glucose values in the 300-400 range. No recent hypoglycemia. Reported some fatigue, chills, and weakness. Complains of LE pain with swelling which has been chronic. Denies polyuria, polydipsia, heat intolerance. Tries to monitor carbs. +multiple hospitalizations for DKA.     Pump Settings:  Basal:  12am 0.275  5am 0.375    I:C 15    Sensitivity:  12AM 60  7AM 40  6PM 60    Target   12am 110-130  8am 100-120    Follows with endocrinologist Dr. Ley. Patient reports following diabetic low carb diet. Eggs for breakfast, sandwich for lunch, chicken for dinner. Denies drinking soda, juices.     PAST MEDICAL & SURGICAL HISTORY:  CHF (congestive heart failure): EF 40-45%  Subclavian vein stenosis, left: s/p stent  DKA, type 1: 1/2015  ACS (acute coronary syndrome): 1/2015 - cath revealed 100% ostial stenosis not amenable to PCI - medical management  TIA (transient ischemic attack): x 2 - 8-9 years ago prior to ASD/VSD repair  CAD (coronary artery disease): s/p stents  Gout: past  CVA (cerebral infarction): with no residual, 8 yrs ago, prior to heart surgery - ST memory loss  Peripheral vascular disease: occluded left fem-pop bypass 5/2015  Diabetes mellitus type 1: Insulin Dependent - Medtronic  Minimed Paradigm Insulin Pump - Novolog  ESRD (end stage renal disease): dialysis  M, tue, thursday, saturday  Hyperlipidemia  Status post device closure of ASD: &quot;clamshell&quot;  History of cardiac catheterization: 1/2015 - no intervention  S/P femoral-popliteal bypass surgery: L and R in 2013 with graft; 5/2015 CFA angioplasty left and ileofemoral endarterectomywith vein patch angioplasty of left fem-pop bypass graft  Multiple vascular surgery both leg, left fempop bypass revision 11/2015  AV (arteriovenous fistula): Left AV graft; revision with stent placement 2-3 years ago  S/P cholecystectomy      FAMILY HISTORY:  Family history of smoking  Family history of hypertension  Family history of cancer (Sibling)      Social History: Former smoker, quit smoking 16yrs ago. No alcohol use.       Outpatient Medications:  Humalog via pump    MEDICATIONS  (STANDING):  dextrose 5%. 1000 milliLiter(s) (50 mL/Hr) IV Continuous <Continuous>  dextrose 50% Injectable 12.5 Gram(s) IV Push once  dextrose 50% Injectable 25 Gram(s) IV Push once  dextrose 50% Injectable 25 Gram(s) IV Push once  insulin regular  human corrective regimen sliding scale   SubCutaneous every 6 hours    MEDICATIONS  (PRN):  dextrose 40% Gel 15 Gram(s) Oral once PRN Blood Glucose LESS THAN 70 milliGRAM(s)/deciLiter  glucagon  Injectable 1 milliGRAM(s) IntraMuscular once PRN Glucose <70 milliGRAM(s)/deciLiter      Allergies    No Known Allergies    Intolerances      Review of Systems:  Constitutional: No fever, No change in weight +chills, + weakness +faitgue  Eyes: No blurry vision  Neuro: No headache, No paresthesias  HEENT: No throat pain  Cardiovascular: No chest pain  Respiratory: No SOB  GI: No nausea or vomiting  : No polyuria  Skin: no rash  Psych: no depression  Endocrine: No polydipsia, No heat or cold intolerance, rest as noted in HPI  Hem/lymph: +swelling    All other review of systems negative      PHYSICAL EXAM:  VITALS: T(C): 36.8 (11-30-18 @ 14:12)  T(F): 98.2 (11-30-18 @ 14:12), Max: 98.4 (11-30-18 @ 11:09)  HR: 82 (11-30-18 @ 14:12) (70 - 82)  BP: 163/74 (11-30-18 @ 14:12) (146/78 - 163/74)  RR:  (8 - 18)  SpO2:  (98% - 100%)  Wt(kg): --  GENERAL: NAD, well-developed  EYES: No proptosis, no lid lag, anicteric  HEENT:  Atraumatic, Normocephalic, moist mucous membranes  RESPIRATORY: Clear to auscultation bilaterally; No rales, rhonchi, wheezing  CARDIOVASCULAR: Regular rate and rhythm; No murmurs; +trace peripheral edema  GI: Soft, nontender, non distended, normal bowel sounds  SKIN: Dry, intact, No rashes or lesions. No ulcers of LE.  MUSCULOSKELETAL: Full range of motion, normal strength  PSYCH: Alert and oriented x 3, normal affect, normal mood  CUSHING'S SIGNS: no striae        POCT Blood Glucose.: 185 mg/dL (11-30-18 @ 14:44)  POCT Blood Glucose.: 338 mg/dL (11-30-18 @ 13:27)  POCT Blood Glucose.: 406 mg/dL (11-30-18 @ 12:04)  POCT Blood Glucose.: 357 mg/dL (11-30-18 @ 10:49)                              11.0   10.4  )-----------( 270      ( 30 Nov 2018 11:12 )             32.9       11-30    136  |  84<L>  |  30<H>  ----------------------------<  385<H>  5.0   |  19<L>  |  4.91<H>    EGFR if : 10<L>  EGFR if non : 9<L>    Ca    9.0      11-30    TPro  7.8  /  Alb  4.6  /  TBili  0.3  /  DBili  x   /  AST  56<H>  /  ALT  28  /  AlkPhos  92  11-30    Thyroid Function Tests:  11-17 @ 08:25 TSH 0.71 FreeT4 -- T3 -- Anti TPO -- Anti Thyroglobulin Ab -- TSI --      Hemoglobin A1C, Whole Blood: 8.6 % <H> [4.0 - 5.6] (11-16-18 @ 20:14)  Hemoglobin A1C, Whole Blood: 9.1 % <H> [4.0 - 5.6] (10-16-18 @ 08:13)        Radiology:

## 2018-11-30 NOTE — ED ADULT NURSE REASSESSMENT NOTE - NS ED NURSE REASSESS COMMENT FT1
Patient appears top be resting comfortably in stretcher. Patient denies any pain, dizziness, n/v/d, numbness, tingling, SOB. MD aPtino made aware of patient's blood pressure. Per ED Carey no intervention at this time. A&OX3. Safety and comfort measures provided.

## 2018-11-30 NOTE — H&P ADULT - NSHPPHYSICALEXAM_GEN_ALL_CORE
PHYSICAL EXAMINATION:  Vital Signs Last 24 Hrs  T(C): 36.7 (30 Nov 2018 16:09), Max: 36.9 (30 Nov 2018 11:09)  T(F): 98.1 (30 Nov 2018 16:09), Max: 98.4 (30 Nov 2018 11:09)  HR: 77 (30 Nov 2018 16:09) (70 - 82)  BP: 182/85 (30 Nov 2018 16:09) (146/78 - 182/85)  BP(mean): --  RR: 16 (30 Nov 2018 16:09) (8 - 18)  SpO2: 99% (30 Nov 2018 16:09) (98% - 100%)  CAPILLARY BLOOD GLUCOSE      POCT Blood Glucose.: 135 mg/dL (30 Nov 2018 15:48)  POCT Blood Glucose.: 185 mg/dL (30 Nov 2018 14:44)  POCT Blood Glucose.: 338 mg/dL (30 Nov 2018 13:27)  POCT Blood Glucose.: 406 mg/dL (30 Nov 2018 12:04)  POCT Blood Glucose.: 357 mg/dL (30 Nov 2018 10:49)      GENERAL: NAD, frail  HEAD:  atraumatic, normocephalic  EYES: sclera anicteric  ENMT: mucous membranes moist  NECK: supple, No JVD  CHEST/LUNG: clear to auscultation bilaterally; no rales, rhonchi, or wheezing b/l  HEART: normal S1, S2  ABDOMEN: BS+, soft, ND, NT   EXTREMITIES:  pulses palpable; no clubbing, cyanosis, or edema b/l LEs  NEURO: awake, alert, interactive; moves all extremities  SKIN: no rashes or lesions

## 2018-11-30 NOTE — ED ADULT NURSE NOTE - NSIMPLEMENTINTERV_GEN_ALL_ED
Implemented All Fall with Harm Risk Interventions:  [place] to call system. Call bell, personal items and telephone within reach. Instruct patient to call for assistance. Room bathroom lighting operational. Non-slip footwear when patient is off stretcher. Physically safe environment: no spills, clutter or unnecessary equipment. Stretcher in lowest position, wheels locked, appropriate side rails in place. Provide visual cue, wrist band, yellow gown, etc. Monitor gait and stability. Monitor for mental status changes and reorient to person, place, and time. Review medications for side effects contributing to fall risk. Reinforce activity limits and safety measures with patient and family. Provide visual clues: red socks.

## 2018-11-30 NOTE — ED PROVIDER NOTE - PROGRESS NOTE DETAILS
Spoke with endocrine regarding ketosis and hyperglycemia will follow pt Will give 9U lantus and bridge with Humalog. Spoke to Dr. Martinez will consult MICU, pt remains stable currently. Will admit to Dr. Martinez.

## 2018-11-30 NOTE — ED PROVIDER NOTE - NS ED ROS FT
CONSTITUTIONAL: No fevers, no chills  Eyes: no visual changes  Ears: no ear drainage, no ear pain  Nose: +nasal congestion  Mouth/Throat: no sore throat  Cardiovascular: No Chest pain  Respiratory: +cough  Gastrointestinal: No n/v/d, no abd pain  Genitourinary: no dysuria, no hematuria  SKIN: no rashes.  NEURO: no headache  PSYCHIATRIC: no known mental health issues. CONSTITUTIONAL: No fevers, +chills  Eyes: no visual changes  Ears: no ear drainage, no ear pain  Nose: +nasal congestion  Mouth/Throat: no sore throat  Cardiovascular: No Chest pain  Respiratory: +cough  Gastrointestinal: No n/v/d, no abd pain  Genitourinary: no dysuria, no hematuria  SKIN: no rashes.  NEURO: no headache  PSYCHIATRIC: no known mental health issues.

## 2018-11-30 NOTE — CONSULT NOTE ADULT - ASSESSMENT
63F h/o DM1 (on insulin pump), ESRD (HD MTTSa), HTN, HLD, PAD, CAD, CHF adm 11/30 for malaise.    1- esrd  2- high agap acidosis --> DKA   3- shpt   4- htn   5- cad    hd consent obtained witnessed and placed in chart  hd today and in am   continue with insulin regimen per endo for hyperglycemia control   cont renvela 3 tab with meals  metoprolol and lisinopril to cont

## 2018-11-30 NOTE — ED ADULT NURSE REASSESSMENT NOTE - NS ED NURSE REASSESS COMMENT FT1
Diabetes educator Anabel at bedside. Per educator Anabel patient does not have insulin but has supplies for insulin pump. Per educator Lynda endocrine aware and will see patient. A&OX3. Safety and comfort measures provided.

## 2018-11-30 NOTE — PATIENT PROFILE ADULT - NSTRANSFERBELONGINGSRESP_GEN_A_NUR
"Pebbles Moulton is a 82 year old female seen June 6, 2017 at [facility] where she was admitted last week after [facility] stay for hyponatremia and CHF.     Patient is seen in her room with daughter Parul present.   Notes persistent cough, \"hot, dry\" relieved by sips of water, but none is available to her because of fluid restriction.    She saw her Cardiologist on Friday and lisinopril changed to losartan, but to date that has not made any difference.   Patient has cough most at night, but occurs at times during the day as well.    No dyspnea, she has been working with therapies.     Patient had stent placement for CAD in early May, was in another Rehab facility, then readmitted for Na 111 thought related to increased free water intake.   Patient was tx'd with fluid restriction and IV furosemide, with return of Na to 132.       Patient has known CHF with EF 20% and diffuse hypokinesis.   She also has fairly longstanding anemia.  By chart review, she has been scheduled for colonoscopy on several occasions, but has not gone through with that procedure, so cause of anemia is still unknown.   Omeprazole and Fe added during hospitalization.         Past Medical History:   Diagnosis Date     Anemia      CAD (coronary artery disease)      CHF (congestive heart failure) (H)      CVA (cerebral vascular accident) (H)      Essential hypertension      Ischemic cardiomyopathy      TIA (transient ischemic attack)      SH:  Lives alone, [place] apartment in [facility].   She has 2 daughters Parul and Lachelle.     FH:  Reviewed and NC     Review Of Systems  Skin: negative   Eyes: impaired vision  Ears/Nose/Throat: hearing loss  Respiratory: No shortness of breath, dyspnea on exertion, cough, or hemoptysis  Cardiovascular: as above  Gastrointestinal: negative  Genitourinary: negative  Musculoskeletal: previously ambulatory in her apartment without device, would use cane if she walked to the grocery store.       Currently in , transfers " with assist and using 4WW with PHYSICAL THERAPY assist.    Neurologic: negative.  ST reports memory okay, deficits with higher level tasks.  CPT pending.    Psychiatric: negative  Hematologic/Lymphatic/Immunologic: anemia  Endocrine: negative      GENERAL APPEARANCE: alert and no distress, up to WC  /69  Pulse 80  Temp 98  F (36.7  C)  Resp 16  Wt 143 lb 6.4 oz (65 kg)  SpO2 100%  BMI 23.86 kg/m2   HEENT: normocephalic, no lesion or abnormalities  NECK: no adenopathy, no asymmetry, masses, or scars and thyroid normal to palpation  RESP: decreased BS, no rales or wheeze.    CV: regular rate and rhythm, normal S1 S2, 1/6 HSM, no gallop.     ABDOMEN:  soft, nontender, no HSM or masses and bowel sounds normal  MS: extremities normal- no gross deformities noted, no evidence of inflammation in joints, trace pedal edema.     SKIN: no suspicious lesions or rashes  NEURO: Normal strength and tone, sensory exam grossly normal, and speech normal  PSYCH: affect okay  LYMPHATICS: No cervical,  or supraclavicular nodes       Lab Results   Component Value Date    HGB 7.7 06/05/2017   MCV 88    Last Basic Metabolic Panel:  Lab Results   Component Value Date     06/05/2017      Lab Results   Component Value Date    POTASSIUM 3.9 06/05/2017     Lab Results   Component Value Date    CHLORIDE 103 06/05/2017     Lab Results   Component Value Date    SAVANNAH 8.8 06/05/2017     Lab Results   Component Value Date    CO2 26 06/05/2017     Lab Results   Component Value Date    BUN 16 06/05/2017     Lab Results   Component Value Date    CR 0.92 05/31/2017   GFR 74  Lab Results   Component Value Date     06/05/2017        ECHOCARDIOGRAM.    Date: 05/03/2017 Start: 10:20 AM [facility]        CONCLUSIONS    Left ventricular ejection fraction is visually estimated at 20%.    Severe diffuse hypokinesis is present with lateral segments the most    preserved.    Mild mitral regurgitation present.         Compared with the previous study dated 5/1/17, there has been no    significant change, no LV thrombus identified.               MR Brain WO IV Cont (05/02/2017 5:06 PM)  MR Brain WO IV Cont (05/02/2017 5:06 PM)   Specimen Performing Laboratory     PN RADIOLOGY       MR Brain WO IV Cont (05/02/2017 5:06 PM)   Impressions   IMPRESSION:      1. Multiple punctate acute/subacute infarcts scattered within the cerebellum and cerebral hemispheres bilaterally are new since the prior study 01/16/2013. Critical findings called to Dr. Prabhu Madrid on 05/02/2017 at 5:45 PM.    2. Chronic cerebellar infarcts and chronic lacunar infarcts within the basal ganglia, thalami, and corona radiata are unchanged.    3. Stable chronic left parietal lobe infarct.    4. Interval progression of chronic microvascular ischemic changes.    5. Stable mild volume loss.             IMP/PLAN:  (E87.1) Hyponatremia  (primary encounter diagnosis)  Comment: resolved.   Na levels consistently >135.     Plan: patient is not doing well on such a severe fluid restriction, and needs sips of water to help with coughing spells, which seem to originate from a dry throat.   Will liberalize FR to 1800 cc/day, can have small amounts of free water available at bedside .   Reviewed with patient and daughter, who report that she will not resume drinking large amounts she did PTA.      (I50.22) Chronic systolic congestive heart failure (H)  Comment: low EF, daughter feels some new s/s of fluid retention.     Plan: furosemide 40 mg/day with K+ replacement, monitor exam and BMP.     Continue carvedilol, anti-platelet rx, losartan, statin.       (D64.9) Anemia, unspecified type  Comment: reviewed stable, low hgb and potential workup at length with daughter.      Plan: decrease Fe to once daily, doubt higher amounts are getting absorbed, and appetite may .    Continue PPI.   Monitor hgb; may need to address dual antiplatelet tx with Cardiology.      Further  workup to include colonoscopy, as outpatient.       (R05) Cough  Comment: as above, seems to originate in her throat.     Plan: follow.    Consider addition of guaifenesin, repeat CXR and/or increase furosemide if cough persists.       (I10) Essential hypertension  Comment: good control  Plan: continue carvedilol, losartan    (R53.81) Physical deconditioning  Comment: after acute illness  Plan: PHYSICAL THERAPY / OCCUPATIONAL THERAPY for strengthening, balance, gait, ADLs.  [place]       (R41.89) Cognitive impairment  Comment: changes since stent placement early May   MRI as above, may be early multi-infarct dementia.       Plan: OCCUPATIONAL THERAPY for formal cognitive testing.       Janelle Vargas MD    yes

## 2018-11-30 NOTE — ED ADULT NURSE NOTE - OBJECTIVE STATEMENT
62 y/o female presenting to the ED via walking in complaining of dry cough, runny nose and chills x yesterday night. Hx of CAD, CHF, DM. Patient completed dialysis yesterday (2L) off. Denies any issues at dialysis. Patient denies any abdominal pain, fevers, n/v/d, numbness, tingling, dizziness. Abdomen soft, nondistended nontender to palpation. Patient states produces very little urine. ED MD Ramos S made aware. Patient noted to have insulin pump to left lower quadrant. Per ED MD Ramos, endocrine team paged. A&OX3. Safety and comfort measures provided. 64 y/o female presenting to the ED via walking in complaining of dry cough, runny nose and chills x yesterday night. Hx of CAD, CHF, DM. Patient completed dialysis yesterday (2L) off. Denies any issues at dialysis. Patient denies any abdominal pain, fevers, n/v/d, numbness, tingling, dizziness. Abdomen soft, nondistended nontender to palpation. Patient states produces very little urine. ED MD Ramos S made aware. Patient noted to have insulin pump to left lower quadrant. Dialysis fistula noted to left upper arm. Positive Bruit, positive thrill. Per ED MD Ramos, endocrine team paged. A&OX3. Safety and comfort measures provided.

## 2018-11-30 NOTE — ED PROVIDER NOTE - ATTENDING CONTRIBUTION TO CARE
Patient presenting reporting feeling unwell today.  Started noticing blood glucoses spiking yesterday went up to 600, took extra insulin at home, today still elevated and began feeling unwell - came to Emergency Department.  Denying dietary changes or non compliance with medications.  No known sick contacts, no fevers.    A 14 point review of systems is negative except as in HPI or otherwise documented.     Exam:  General: Patient well appearing, mildly hypertensive vital signs within normal limits  HEENT: airway patent with moist mucous membranes  Cardiac: RRR S1/S2 with strong peripheral pulses  Respiratory: lungs clear without respiratory distress  GI: abdomen soft, non tender, non distended  Neuro: no gross neurologic deficits  Skin: warm, well perfused  Psych: normal mood and affect    Patient with multiple medical problems now presenting with malaise and hyperglycemia over two days, unclear if hyperglycemia is secondary to other medical process or primary cause of feeling unwell plan for labs, CXR, RVP, UA (if possible patient on dialysis), insulin for blood sugar management and re-evaluate but likely admission.

## 2018-11-30 NOTE — CONSULT NOTE ADULT - SUBJECTIVE AND OBJECTIVE BOX
CHIEF COMPLAINT:    HPI:    PAST MEDICAL & SURGICAL HISTORY:  CHF (congestive heart failure): EF 40-45%  Subclavian vein stenosis, left: s/p stent  DKA, type 1: 1/2015  ACS (acute coronary syndrome): 1/2015 - cath revealed 100% ostial stenosis not amenable to PCI - medical management  TIA (transient ischemic attack): x 2 - 8-9 years ago prior to ASD/VSD repair  CAD (coronary artery disease): s/p stents  Gout: past  CVA (cerebral infarction): with no residual, 8 yrs ago, prior to heart surgery - ST memory loss  Peripheral vascular disease: occluded left fem-pop bypass 5/2015  Diabetes mellitus type 1: Insulin Dependent - Medtronic  Minimed Paradigm Insulin Pump - Novolog  ESRD (end stage renal disease): dialysis  M, tue, thursday, saturday  Hyperlipidemia  Status post device closure of ASD: &quot;clamshell&quot;  History of cardiac catheterization: 1/2015 - no intervention  S/P femoral-popliteal bypass surgery: L and R in 2013 with graft; 5/2015 CFA angioplasty left and ileofemoral endarterectomywith vein patch angioplasty of left fem-pop bypass graft  Multiple vascular surgery both leg, left fempop bypass revision 11/2015  AV (arteriovenous fistula): Left AV graft; revision with stent placement 2-3 years ago  S/P cholecystectomy      FAMILY HISTORY:  Family history of smoking  Family history of hypertension  Family history of cancer (Sibling)      SOCIAL HISTORY:  Smoking: [ ] Never Smoked [ ] Former Smoker (__ packs x ___ years) [ ] Current Smoker  (__ packs x ___ years)  Substance Use: [ ] Never Used [ ] Used ____  EtOH Use:  Marital Status: [ ] Single [ ]  [ ]  [ ]   Sexual History:   Occupation:  Recent Travel:  Country of Birth:  Advance Directives:    Allergies    No Known Allergies    Intolerances        HOME MEDICATIONS:    REVIEW OF SYSTEMS:  Constitutional: [ ] fevers [ ] chills [ ] weight loss [ ] weight gain  HEENT: [ ] dry eyes [ ] eye irritation [ ] postnasal drip [ ] nasal congestion  CV: [ ] chest pain [ ] orthopnea [ ] palpitations [ ] murmur  Resp: [ ] cough [ ] shortness of breath [ ] dyspnea [ ] wheezing [ ] sputum [ ] hemoptysis  GI: [ ] nausea [ ] vomiting [ ] diarrhea [ ] constipation [ ] abd pain [ ] dysphagia   : [ ] dysuria [ ] nocturia [ ] hematuria [ ] increased urinary frequency  Musculoskeletal: [ ] back pain [ ] myalgias [ ] arthralgias [ ] fracture  Skin: [ ] rash [ ] itch  Neurological: [ ] headache [ ] dizziness [ ] syncope [ ] weakness [ ] numbness  Psychiatric: [ ] anxiety [ ] depression  Endocrine: [ ] diabetes [ ] thyroid problem  Hematologic/Lymphatic: [ ] anemia [ ] bleeding problem  Allergic/Immunologic: [ ] itchy eyes [ ] nasal discharge [ ] hives [ ] angioedema  [ ] All other systems negative  [ ] Unable to assess ROS because ________    OBJECTIVE:  ICU Vital Signs Last 24 Hrs  T(C): 36.9 (30 Nov 2018 11:09), Max: 36.9 (30 Nov 2018 11:09)  T(F): 98.4 (30 Nov 2018 11:09), Max: 98.4 (30 Nov 2018 11:09)  HR: 70 (30 Nov 2018 11:09) (70 - 76)  BP: 146/78 (30 Nov 2018 11:09) (146/78 - 153/77)  BP(mean): --  ABP: --  ABP(mean): --  RR: 18 (30 Nov 2018 11:09) (8 - 18)  SpO2: 98% (30 Nov 2018 11:09) (98% - 98%)        CAPILLARY BLOOD GLUCOSE      POCT Blood Glucose.: 406 mg/dL (30 Nov 2018 12:04)      PHYSICAL EXAM:  General:   HEENT:   Lymph Nodes:  Neck:   Respiratory:   Cardiovascular:   Abdomen:   Extremities:   Skin:   Neurological:  Psychiatry:    LINES:     HOSPITAL MEDICATIONS:  MEDICATIONS  (STANDING):    MEDICATIONS  (PRN):      LABS:                        11.0   10.4  )-----------( 270      ( 30 Nov 2018 11:12 )             32.9     Hgb Trend: 11.0<--  11-30    134<L>  |  84<L>  |  28<H>  ----------------------------<  374<H>  6.2<HH>   |  22  |  4.75<H>    Ca    9.0      30 Nov 2018 11:12    TPro  7.8  /  Alb  4.6  /  TBili  0.3  /  DBili  x   /  AST  56<H>  /  ALT  28  /  AlkPhos  92  11-30    Creatinine Trend: 4.75<--, 2.54<--, 1.95<--, 1.48<--, 1.23<--, 2.86<--  PT/INR - ( 30 Nov 2018 11:12 )   PT: 11.6 sec;   INR: 1.01 ratio         PTT - ( 30 Nov 2018 11:12 )  PTT:28.9 sec      Venous Blood Gas:  11-30 @ 11:12  7.36/45/40/25/68  VBG Lactate: 1.9      MICROBIOLOGY:     RADIOLOGY:  [ ] Reviewed and interpreted by me    EKG: CHIEF COMPLAINT: weakness, fatigue    HPI:  63F h/o DM1 (on insulin pump), ESRD (HD MTTSa), HTN, HLD, PAD, CAD, CHF adm 11/30 for malaise.    The patient reports that she has been feeling weak for about 2-3 days.  She reports decreased PO intake, chills, lightheadedness, and nausea.  She reports emesis x2 yesterday that was not bloody.  She denies SOB, cough, sputum production, abd pain, diarrhea, HA.    The patient states that when she feels badly, her sugars will go "out of control."  She reports minimal urination that is at baseline.  She has an insulin pump that is set to give 1.8U/hr.    PAST MEDICAL & SURGICAL HISTORY:  CHF (congestive heart failure): EF 40-45%  Subclavian vein stenosis, left: s/p stent  DKA, type 1: 1/2015  ACS (acute coronary syndrome): 1/2015 - cath revealed 100% ostial stenosis not amenable to PCI - medical management  TIA (transient ischemic attack): x 2 - 8-9 years ago prior to ASD/VSD repair  CAD (coronary artery disease): s/p stents  Gout: past  CVA (cerebral infarction): with no residual, 8 yrs ago, prior to heart surgery - ST memory loss  Peripheral vascular disease: occluded left fem-pop bypass 5/2015  Diabetes mellitus type 1: Insulin Dependent - Medtr[employer]  Minimed Paradigm Insulin Pump - Novolog  ESRD (end stage renal disease): dialysis  M, tue, thursday, saturday  Hyperlipidemia  Status post device closure of ASD: &quot;clamshell&quot;  History of cardiac catheterization: 1/2015 - no intervention  S/P femoral-popliteal bypass surgery: L and R in 2013 with graft; 5/2015 CFA angioplasty left and ileofemoral endarterectomywith vein patch angioplasty of left fem-pop bypass graft  Multiple vascular surgery both leg, left fempop bypass revision 11/2015  AV (arteriovenous fistula): Left AV graft; revision with stent placement 2-3 years ago  S/P cholecystectomy      FAMILY HISTORY:  Family history of smoking  Family history of hypertension  Family history of cancer (Sibling)      SOCIAL HISTORY:  Smoking: [ ] Never Smoked [ x ] Former Smoker (1 packs x 28 years) [ ] Current Smoker  (__ packs x ___ years)  Substance Use: [ ] Never Used [ ] Used ____ [ x ] denies illicit drug use  EtOH Use: drinks 1 drink at special occasions  Marital Status: [ ] Single [ x ]  [ ]  [ ]   Sexual History:   Occupation: not working  Recent Travel:  Country of Birth:  Advance Directives:    Allergies    No Known Allergies    Intolerances        HOME MEDICATIONS:    REVIEW OF SYSTEMS:  Constitutional: [ - ] fevers [ + ] chills [ ] weight loss [ ] weight gain  HEENT: [ ] dry eyes [ ] eye irritation [ ] postnasal drip [ ] nasal congestion  CV: [ - ] chest pain [ ] orthopnea [ ] palpitations [ ] murmur  Resp: [ - ] cough [ - ] shortness of breath [ - ] dyspnea [ ] wheezing [ ] sputum [ ] hemoptysis  GI: [ + ] nausea [ + ] vomiting [ - ] diarrhea [ - ] constipation [ - ] abd pain [ ] dysphagia   : [ - ] dysuria [ ] nocturia [ ] hematuria [ ] increased urinary frequency  Musculoskeletal: [ ] back pain [ ] myalgias [ ] arthralgias [ ] fracture  Skin: [ ] rash [ ] itch  Neurological: [ - ] headache [ + ] dizziness [ ] syncope [ + ] weakness [ ] numbness  Psychiatric: [ ] anxiety [ ] depression  Endocrine: [ + ] diabetes [ ] thyroid problem  Hematologic/Lymphatic: [ ] anemia [ ] bleeding problem  Allergic/Immunologic: [ ] itchy eyes [ ] nasal discharge [ ] hives [ ] angioedema  [ ] All other systems negative  [ ] Unable to assess ROS because ________    OBJECTIVE:  ICU Vital Signs Last 24 Hrs  T(C): 36.9 (30 Nov 2018 11:09), Max: 36.9 (30 Nov 2018 11:09)  T(F): 98.4 (30 Nov 2018 11:09), Max: 98.4 (30 Nov 2018 11:09)  HR: 70 (30 Nov 2018 11:09) (70 - 76)  BP: 146/78 (30 Nov 2018 11:09) (146/78 - 153/77)  BP(mean): --  ABP: --  ABP(mean): --  RR: 18 (30 Nov 2018 11:09) (8 - 18)  SpO2: 98% (30 Nov 2018 11:09) (98% - 98%)        CAPILLARY BLOOD GLUCOSE      POCT Blood Glucose.: 406 mg/dL (30 Nov 2018 12:04)      PHYSICAL EXAM:  Gen: awake, alert but appears tired  HENT: neck soft / supple; MM dry; petechiae noted on uvula  Lymph: no LAD noted in neck  Eye: PERRL, sclerae anicteric  CV: normal rate, regular rhythm  Pulm: CTAB, no w/r/r auscultated  Abd: +BS, soft, NT, ND  Skin: warm, dry  Ext: no LE edema  Neuro: answering questions appropriately, following commands appropriately, recent and remote memory intact  Psych: normal mood / affect      LINES:     HOSPITAL MEDICATIONS:  MEDICATIONS  (STANDING):    MEDICATIONS  (PRN):      LABS:                        11.0   10.4  )-----------( 270      ( 30 Nov 2018 11:12 )             32.9     Hgb Trend: 11.0<--  11-30    134<L>  |  84<L>  |  28<H>  ----------------------------<  374<H>  6.2<HH>   |  22  |  4.75<H>    Ca    9.0      30 Nov 2018 11:12    TPro  7.8  /  Alb  4.6  /  TBili  0.3  /  DBili  x   /  AST  56<H>  /  ALT  28  /  AlkPhos  92  11-30    Creatinine Trend: 4.75<--, 2.54<--, 1.95<--, 1.48<--, 1.23<--, 2.86<--  PT/INR - ( 30 Nov 2018 11:12 )   PT: 11.6 sec;   INR: 1.01 ratio         PTT - ( 30 Nov 2018 11:12 )  PTT:28.9 sec      Venous Blood Gas:  11-30 @ 11:12  7.36/45/40/25/68  VBG Lactate: 1.9      MICROBIOLOGY:     RADIOLOGY:  [ ] Reviewed and interpreted by me    EKG:

## 2018-11-30 NOTE — CONSULT NOTE ADULT - ASSESSMENT
63F h/o DM1 (on insulin pump), ESRD (HD MTTSa), CAD, CHF adm 11/30 for malaise found to be in HONK state.    #HONK State  - Hyperglycemic with normal pH.  - Not in DKA.  - Endo consulted, f/u their recs. 63F h/o DM1 (on insulin pump), ESRD (HD MTTSa), HTN, HLD, PAD, CAD, CHF adm 11/30 for malaise found to be in HONK state.    #HONK State  - Hyperglycemic with normal pH.  - Not in DKA.  - Appears dry, would fluid resuscitate.  - Endo consulted, f/u their recs.    Patient is not a MICU candidate at this time.  Please re-consult as appropriate.    Plan TO BE DISCUSSED with attending.    Delilah Whitfield MD  PGY-3 | Internal Medicine  7785 63F h/o DM1 (on insulin pump), ESRD (HD MTTSa), HTN, HLD, PAD, CAD, CHF adm 11/30 for malaise found to be in HONK state.    #HONK State  - Hyperglycemic with normal pH, elevated AG.  - Not in DKA.  - Appears dry, would fluid resuscitate carefully given h/o CHF.  - Endo consulted, please f/u their recs.  Will likely require more insulin    #Hyperkalemia  - Hemolyzed, corrected.    Patient is not a MICU candidate at this time.  Please re-consult as appropriate.    Plan TO BE DISCUSSED with attending.    Delilah Whitfield MD  PGY-3 | Internal Medicine  7552 63F h/o DM1 (on insulin pump), ESRD (HD MTTSa), HTN, HLD, PAD, CAD, CHF adm 11/30 for malaise found to be in mild DKA.    #DKA  - Hyperglycemic with normal pH, elevated AG.  Very mild DKA.  - Unclear etiology for mild DKA.  Consider infection, ?ACS, malfunction of pump.  Would investigate etiology further, as it is not clear at this time.  - Appears dry and A lines on POCUS, please give 250cc x1 and then can give another 250cc x1.  - Endo consulted, please f/u their recs.  Will likely require more insulin.    #Hyperkalemia  - Hemolyzed, corrected.    Patient is not a MICU candidate at this time.  Please re-consult as appropriate.    Plan discussed with Dr. Welsh.    Delilah Whitfield MD  PGY-3 | Internal Medicine  7290 63F h/o DM1 (on insulin pump), ESRD (HD MTTSa), HTN, HLD, PAD, CAD, CHF adm 11/30 for malaise found to be in mild DKA.    #DKA  - Hyperglycemic with normal pH, elevated AG.  Mild DKA.  - Unclear etiology for mild DKA.  Consider infection, ?ACS, malfunction of pump.  Would investigate etiology further, as it is not clear at this time.  - Appears dry and A lines on POCUS, please give 250cc x1 and then can give another 250cc x1.  - Endo consulted, please f/u their recs.  Pt has been given lantus 9U x1 and humalog 9U in total.    #Hyperkalemia  - Hemolyzed, corrected.    Patient is not a MICU candidate at this time.  Please re-consult as appropriate.    Plan discussed with Dr. Welsh.    Delilah Whitfield MD  PGY-3 | Internal Medicine  [id number]

## 2018-11-30 NOTE — ADVANCED PRACTICE NURSE CONSULT - ASSESSMENT
63 year old female w/uncontrolled T1DM on Medtronic insulin pump c/b ESRD on HD, CAD, TIA, PVD, neuropathy here with due to decreased appetite with s/s of a cold with extremely elevated FS stating she went from 300 mg/dl to 427 mg/dl in 1 hour and decided to come to the ED.  Pt well known to diabetes team and has had several hospitalization r/t DKA.  Review of insulin pump shows FS values in the 500’s-600’s yesterday.  Pt last changed her infusion set on 11/25/18 and was supposed to change her site on 11/28/18.  Pt needs to change her site today if she is going to remain on her insulin pump.  Insulin pump has 30 units of insulin left. If pt remains on insulin pump, she needs to use Hospital insulin.  Pt uses Humalog and will need vial.  Pt has insulin pump supplies at bedside.  ED wants pt seen soon because they do not feel comfortable putting in pump orders and pt has to change her infusion set.  Endocrine team made aware.  Primary RN made aware that pt needs to complete insulin pump forms.  Provider needs to sign attestation form.  Primary RN Delilah made aware.  Insulin pump site on L abdomen with no redness or induration.   Pt’s insulin pump settings as follows    Basal  12am – 0.275 units/hour  5am – 0.375 units/hour  Total daily basal – 8.5 units     ICR  12am-12am – 1:15    ISF  12am – 60  7am – 40  18:00- 60    BGT  12am – 110-130 mg/dl  8am – 100-120 mg/dl    Insulin duration – 6 hours

## 2018-11-30 NOTE — H&P ADULT - ASSESSMENT
63 f with  DKA- IVF, Insulin, BS control, Endocrine evaluation Restart Diet. d/w Dr. Lorenzo  ESRD- HD, Nephrology evaluation Dr. Schmidt  CAD (coronary artery disease)  s/p stents- continue Rx. Cardiology evaluation Dr. Biswas.  CHF (congestive heart failure)  EF 40-45%- stable, continue Rx  CVA (cerebral infarction) - stable  Diabetes mellitus type 1  Insulin Dependent - Endocrine follow.  Gout- stable.  Depression- continue Rx  Hyperlipidemia- continue Rx    Peripheral vascular disease  occluded left fem-pop - stable  Further action as per clinical course   Pierce Viera MD pager [phone]

## 2018-11-30 NOTE — ADVANCED PRACTICE NURSE CONSULT - RECOMMEDATIONS
Insulin pump forms need to be completed and attestation form needs to be signed by provider.  If pt is not in DKA, pt can remain on insulin pump but pt will need Humalog vial to change her infusion set site.  Pt has insulin pump supplies with her.  Primary RN Delilah made aware.  Endocrine team to follow.

## 2018-11-30 NOTE — ED ADULT NURSE REASSESSMENT NOTE - NS ED NURSE REASSESS COMMENT FT1
Per ED MD Ramos S, endocrine states to take pump. Per ED MD Ramos will order insulin and pump to be taken off post administration. Will continue to monitor patient. Safety and comfort measures provided.

## 2018-11-30 NOTE — ED PROVIDER NOTE - OBJECTIVE STATEMENT
63 YOF CAD, CHF, HD MTThSat, last had dialysis yesterday 2L off which was normal for pt. Pt states she has been feeling unwell with nasal congestion, slight cough and chills since yesterday night. Pt denies any abdominal pain, denies fever, denies chest pain, denies sore throat. Pt has medtronic insulin pump basal rate 1.8u/hr.

## 2018-11-30 NOTE — CONSULT NOTE ADULT - SUBJECTIVE AND OBJECTIVE BOX
[facility]  [phone]  NEPHROLOGY      INITIAL CONSULT NOTE  --------------------------------------------------------------------------------  HPI:    63F h/o DM1 (on insulin pump), ESRD (HD MTTSa), HTN, HLD, PAD, CAD, CHF adm 11/30 for malaise.  The patient reports that she has been feeling weak for about 2-3 days.  She reports decreased PO intake, chills, lightheadedness, and nausea.  She reports emesis x2 yesterday that was not bloody.  She denies SOB, cough, sputum production, abd pain, diarrhea, HA. The patient states that when she feels badly, her sugars will go "out of control."  has c/o chills and not feeling well.  pt with high AGAP acidosis as well.     PAST HISTORY  --------------------------------------------------------------------------------  PAST MEDICAL & SURGICAL HISTORY:  CHF (congestive heart failure): EF 40-45%  Subclavian vein stenosis, left: s/p stent  DKA, type 1: 1/2015  ACS (acute coronary syndrome): 1/2015 - cath revealed 100% ostial stenosis not amenable to PCI - medical management  TIA (transient ischemic attack): x 2 - 8-9 years ago prior to ASD/VSD repair  CAD (coronary artery disease): s/p stents  Gout: past  CVA (cerebral infarction): with no residual, 8 yrs ago, prior to heart surgery - ST memory loss  Peripheral vascular disease: occluded left fem-pop bypass 5/2015  Diabetes mellitus type 1: Insulin Dependent - Medtronic  Minimed Paradigm Insulin Pump - Novolog  ESRD (end stage renal disease): dialysis  M, tue, thursday, saturday  Hyperlipidemia  Status post device closure of ASD: &quot;[place]&quot;  History of cardiac catheterization: 1/2015 - no intervention  S/P femoral-popliteal bypass surgery: L and R in 2013 with graft; 5/2015 CFA angioplasty left and ileofemoral endarterectomywith vein patch angioplasty of left fem-pop bypass graft  Multiple vascular surgery both leg, left fempop bypass revision 11/2015  AV (arteriovenous fistula): Left AV graft; revision with stent placement 2-3 years ago  S/P cholecystectomy    FAMILY HISTORY:  Family history of smoking  Family history of hypertension  Family history of cancer (Sibling)    PAST SOCIAL HISTORY: no active tobacco use or alcohol use     ALLERGIES & MEDICATIONS  --------------------------------------------------------------------------------  Allergies    No Known Allergies    Intolerances      Standing Inpatient Medications  aspirin enteric coated 81 milliGRAM(s) Oral daily  atorvastatin 20 milliGRAM(s) Oral at bedtime  cinacalcet 60 milliGRAM(s) Oral daily  clopidogrel Tablet 75 milliGRAM(s) Oral daily  dextrose 5%. 1000 milliLiter(s) IV Continuous <Continuous>  dextrose 5%. 1000 milliLiter(s) IV Continuous <Continuous>  dextrose 50% Injectable 12.5 Gram(s) IV Push once  dextrose 50% Injectable 25 Gram(s) IV Push once  dextrose 50% Injectable 25 Gram(s) IV Push once  dextrose 50% Injectable 12.5 Gram(s) IV Push once  dextrose 50% Injectable 25 Gram(s) IV Push once  dextrose 50% Injectable 25 Gram(s) IV Push once  DULoxetine 60 milliGRAM(s) Oral daily  heparin  Injectable 5000 Unit(s) SubCutaneous every 12 hours  hydrALAZINE 100 milliGRAM(s) Oral every 8 hours  insulin lispro (HumaLOG) corrective regimen sliding scale   SubCutaneous three times a day before meals  insulin lispro (HumaLOG) corrective regimen sliding scale   SubCutaneous at bedtime  insulin lispro Injectable (HumaLOG) 4 Unit(s) SubCutaneous before breakfast  insulin lispro Injectable (HumaLOG) 4 Unit(s) SubCutaneous before lunch  insulin lispro Injectable (HumaLOG) 4 Unit(s) SubCutaneous before dinner  lisinopril 40 milliGRAM(s) Oral daily  metoprolol succinate ER 50 milliGRAM(s) Oral daily  multivitamin 1 Tablet(s) Oral daily  sevelamer hydrochloride 2400 milliGRAM(s) Oral two times a day with meals    PRN Inpatient Medications  dextrose 40% Gel 15 Gram(s) Oral once PRN  dextrose 40% Gel 15 Gram(s) Oral once PRN  glucagon  Injectable 1 milliGRAM(s) IntraMuscular once PRN  glucagon  Injectable 1 milliGRAM(s) IntraMuscular once PRN  oxyCODONE    5 mG/acetaminophen 325 mG 1 Tablet(s) Oral every 8 hours PRN      REVIEW OF SYSTEMS  --------------------------------------------------------------------------------  Gen: No  fevers/chills  +   Skin: No rashes  Head/Eyes/Ears/Mouth: No headache; Normal hearing;  No sinus pain/discomfort, sore throat  Respiratory: + dyspnea,  + cough, - wheezing, hemoptysis -   CV: No chest pain, orthopnea  GI: No abdominal pain, diarrhea, nausea,   + vomiting,  - melena, hematochezia  : No dysuria, + decrease urination -  hematuria,  MSK: No joint pain/swelling; no back pain  Neuro: No dizziness/lightheadedness,   also with no edema     All other systems were reviewed and are negative, except as noted.    VITALS/PHYSICAL EXAM  --------------------------------------------------------------------------------  T(C): 36.7 (11-30-18 @ 18:55), Max: 36.9 (11-30-18 @ 11:09)  HR: 77 (11-30-18 @ 18:55) (70 - 82)  BP: 156/83 (11-30-18 @ 18:55) (146/78 - 182/85)  RR: 18 (11-30-18 @ 18:55) (8 - 18)  SpO2: 97% (11-30-18 @ 18:55) (97% - 100%)  Wt(kg): --  Height (cm): 154.94 (11-30-18 @ 10:22)  Weight (kg): 49.9 (11-30-18 @ 10:22)  BMI (kg/m2): 20.8 (11-30-18 @ 10:22)  BSA (m2): 1.47 (11-30-18 @ 10:22)      Physical Exam:  	Gen: + chronically ill appearing F   	no jvd ,  	Pulm: decrease bs  no rales or ronchi or wheezing  	CV: RRR, S1S2; no rub  	Back: No CVA tenderness; no sacral edema  	Abd: +BS, soft, nontender/nondistended  	: No suprapubic tenderness  	UE: Warm, no cyanosis  no clubbing,  no edema  	LE: Warm, no cyanosis  no clubbing, 1+ LLE  edema  	Neuro: alert and oriented. speech coherent   Skin: Warm, no decrease skin turgor   	Vascular access: + avf + bruit and thrill     LABS/STUDIES  --------------------------------------------------------------------------------              11.0   10.4  >-----------<  270      [11-30-18 @ 11:12]              32.9     136  |  84  |  30  ----------------------------<  385      [11-30-18 @ 13:32]  5.0   |  19  |  4.91        Ca     9.0     [11-30-18 @ 13:32]    TPro  7.8  /  Alb  4.6  /  TBili  0.3  /  DBili  x   /  AST  56  /  ALT  28  /  AlkPhos  92  [11-30-18 @ 11:12]    PT/INR: PT 11.6 , INR 1.01       [11-30-18 @ 11:12]  PTT: 28.9       [11-30-18 @ 11:12]      Creatinine Trend:  SCr 4.91 [11-30 @ 13:32]  SCr 4.75 [11-30 @ 11:12]  SCr 2.54 [11-18 @ 06:19]  SCr 1.95 [11-17 @ 22:13]  SCr 1.48 [11-17 @ 17:34]    Urinalysis - [06-04-17 @ 08:24]      Color Yellow / Appearance Clear / SG 1.013 / pH 8.5      Gluc 1000 / Ketone Negative  / Bili Negative / Urobili Negative       Blood Negative / Protein >600 / Leuk Est Small / Nitrite Negative      RBC 3-5 / WBC 6-10 / Hyaline  / Gran  / Sq Epi  / Non Sq Epi OCC / Bacteria       PTH -- (Ca 8.3)      [03-03-18 @ 08:53]   134  HbA1c 8.6      [11-16-18 @ 20:14]  TSH 0.71      [11-17-18 @ 08:25]  Lipid: chol 113, TG 86, HDL 59, LDL 37      [04-30-18 @ 06:44]    HBsAb <3.0      [11-17-18 @ 00:31]  HBsAb Nonreact      [05-02-15 @ 15:35]  HBsAg Nonreact      [11-17-18 @ 00:31]  HBcAb Nonreact      [11-17-18 @ 00:31]  HCV 0.13, Nonreact      [11-17-18 @ 00:31]

## 2018-12-01 LAB
ANION GAP SERPL CALC-SCNC: 20 MMOL/L — HIGH (ref 5–17)
ANION GAP SERPL CALC-SCNC: 20 MMOL/L — HIGH (ref 5–17)
B-OH-BUTYR SERPL-SCNC: 0.2 MMOL/L — SIGNIFICANT CHANGE UP
B-OH-BUTYR SERPL-SCNC: 2.5 MMOL/L — HIGH
BASE EXCESS BLDV CALC-SCNC: 1.7 MMOL/L — SIGNIFICANT CHANGE UP (ref -2–2)
BUN SERPL-MCNC: 13 MG/DL — SIGNIFICANT CHANGE UP (ref 7–23)
BUN SERPL-MCNC: 14 MG/DL — SIGNIFICANT CHANGE UP (ref 7–23)
CALCIUM SERPL-MCNC: 8.4 MG/DL — SIGNIFICANT CHANGE UP (ref 8.4–10.5)
CALCIUM SERPL-MCNC: 8.6 MG/DL — SIGNIFICANT CHANGE UP (ref 8.4–10.5)
CHLORIDE SERPL-SCNC: 89 MMOL/L — LOW (ref 96–108)
CHLORIDE SERPL-SCNC: 91 MMOL/L — LOW (ref 96–108)
CO2 BLDV-SCNC: 27 MMOL/L — SIGNIFICANT CHANGE UP (ref 22–30)
CO2 SERPL-SCNC: 23 MMOL/L — SIGNIFICANT CHANGE UP (ref 22–31)
CO2 SERPL-SCNC: 24 MMOL/L — SIGNIFICANT CHANGE UP (ref 22–31)
CREAT SERPL-MCNC: 3.08 MG/DL — HIGH (ref 0.5–1.3)
CREAT SERPL-MCNC: 3.43 MG/DL — HIGH (ref 0.5–1.3)
GAS PNL BLDV: SIGNIFICANT CHANGE UP
GLUCOSE BLDC GLUCOMTR-MCNC: 167 MG/DL — HIGH (ref 70–99)
GLUCOSE BLDC GLUCOMTR-MCNC: 334 MG/DL — HIGH (ref 70–99)
GLUCOSE BLDC GLUCOMTR-MCNC: 416 MG/DL — HIGH (ref 70–99)
GLUCOSE BLDC GLUCOMTR-MCNC: 58 MG/DL — LOW (ref 70–99)
GLUCOSE BLDC GLUCOMTR-MCNC: 66 MG/DL — LOW (ref 70–99)
GLUCOSE BLDC GLUCOMTR-MCNC: 84 MG/DL — SIGNIFICANT CHANGE UP (ref 70–99)
GLUCOSE BLDC GLUCOMTR-MCNC: 87 MG/DL — SIGNIFICANT CHANGE UP (ref 70–99)
GLUCOSE SERPL-MCNC: 301 MG/DL — HIGH (ref 70–99)
GLUCOSE SERPL-MCNC: 323 MG/DL — HIGH (ref 70–99)
HCO3 BLDV-SCNC: 26 MMOL/L — SIGNIFICANT CHANGE UP (ref 21–29)
HCT VFR BLD CALC: 29.1 % — LOW (ref 34.5–45)
HGB BLD-MCNC: 9.2 G/DL — LOW (ref 11.5–15.5)
MCHC RBC-ENTMCNC: 31.5 PG — SIGNIFICANT CHANGE UP (ref 27–34)
MCHC RBC-ENTMCNC: 31.6 GM/DL — LOW (ref 32–36)
MCV RBC AUTO: 99.7 FL — SIGNIFICANT CHANGE UP (ref 80–100)
PCO2 BLDV: 43 MMHG — SIGNIFICANT CHANGE UP (ref 35–50)
PH BLDV: 7.4 — SIGNIFICANT CHANGE UP (ref 7.35–7.45)
PLATELET # BLD AUTO: 250 K/UL — SIGNIFICANT CHANGE UP (ref 150–400)
PO2 BLDV: <50 MMHG — SIGNIFICANT CHANGE UP (ref 25–45)
POTASSIUM SERPL-MCNC: 3.9 MMOL/L — SIGNIFICANT CHANGE UP (ref 3.5–5.3)
POTASSIUM SERPL-MCNC: 4.4 MMOL/L — SIGNIFICANT CHANGE UP (ref 3.5–5.3)
POTASSIUM SERPL-SCNC: 3.9 MMOL/L — SIGNIFICANT CHANGE UP (ref 3.5–5.3)
POTASSIUM SERPL-SCNC: 4.4 MMOL/L — SIGNIFICANT CHANGE UP (ref 3.5–5.3)
RBC # BLD: 2.92 M/UL — LOW (ref 3.8–5.2)
RBC # FLD: 14 % — SIGNIFICANT CHANGE UP (ref 10.3–14.5)
SAO2 % BLDV: 58 % — LOW (ref 67–88)
SODIUM SERPL-SCNC: 133 MMOL/L — LOW (ref 135–145)
SODIUM SERPL-SCNC: 134 MMOL/L — LOW (ref 135–145)
WBC # BLD: 6.27 K/UL — SIGNIFICANT CHANGE UP (ref 3.8–10.5)
WBC # FLD AUTO: 6.27 K/UL — SIGNIFICANT CHANGE UP (ref 3.8–10.5)

## 2018-12-01 RX ORDER — SODIUM CHLORIDE 9 MG/ML
1000 INJECTION INTRAMUSCULAR; INTRAVENOUS; SUBCUTANEOUS
Qty: 0 | Refills: 0 | Status: DISCONTINUED | OUTPATIENT
Start: 2018-12-01 | End: 2018-12-01

## 2018-12-01 RX ORDER — ERYTHROPOIETIN 10000 [IU]/ML
10000 INJECTION, SOLUTION INTRAVENOUS; SUBCUTANEOUS ONCE
Qty: 0 | Refills: 0 | Status: COMPLETED | OUTPATIENT
Start: 2018-12-01 | End: 2018-12-01

## 2018-12-01 RX ORDER — INSULIN LISPRO 100/ML
4 VIAL (ML) SUBCUTANEOUS
Qty: 0 | Refills: 0 | Status: DISCONTINUED | OUTPATIENT
Start: 2018-12-01 | End: 2018-12-02

## 2018-12-01 RX ORDER — INSULIN LISPRO 100/ML
5 VIAL (ML) SUBCUTANEOUS
Qty: 0 | Refills: 0 | Status: DISCONTINUED | OUTPATIENT
Start: 2018-12-01 | End: 2018-12-01

## 2018-12-01 RX ORDER — DEXTROSE 50 % IN WATER 50 %
12.5 SYRINGE (ML) INTRAVENOUS ONCE
Qty: 0 | Refills: 0 | Status: COMPLETED | OUTPATIENT
Start: 2018-12-01 | End: 2018-12-01

## 2018-12-01 RX ORDER — INSULIN LISPRO 100/ML
VIAL (ML) SUBCUTANEOUS
Qty: 0 | Refills: 0 | Status: DISCONTINUED | OUTPATIENT
Start: 2018-12-01 | End: 2018-12-02

## 2018-12-01 RX ORDER — OXYCODONE AND ACETAMINOPHEN 5; 325 MG/1; MG/1
1 TABLET ORAL ONCE
Qty: 0 | Refills: 0 | Status: DISCONTINUED | OUTPATIENT
Start: 2018-12-01 | End: 2018-12-01

## 2018-12-01 RX ORDER — INSULIN GLARGINE 100 [IU]/ML
13 INJECTION, SOLUTION SUBCUTANEOUS
Qty: 0 | Refills: 0 | Status: DISCONTINUED | OUTPATIENT
Start: 2018-12-01 | End: 2018-12-01

## 2018-12-01 RX ORDER — INSULIN GLARGINE 100 [IU]/ML
9 INJECTION, SOLUTION SUBCUTANEOUS
Qty: 0 | Refills: 0 | Status: DISCONTINUED | OUTPATIENT
Start: 2018-12-01 | End: 2018-12-02

## 2018-12-01 RX ORDER — ONDANSETRON 8 MG/1
4 TABLET, FILM COATED ORAL ONCE
Qty: 0 | Refills: 0 | Status: COMPLETED | OUTPATIENT
Start: 2018-12-01 | End: 2018-12-01

## 2018-12-01 RX ADMIN — ATORVASTATIN CALCIUM 20 MILLIGRAM(S): 80 TABLET, FILM COATED ORAL at 21:50

## 2018-12-01 RX ADMIN — DULOXETINE HYDROCHLORIDE 60 MILLIGRAM(S): 30 CAPSULE, DELAYED RELEASE ORAL at 11:39

## 2018-12-01 RX ADMIN — Medication 50 MILLIGRAM(S): at 05:58

## 2018-12-01 RX ADMIN — Medication 12.5 GRAM(S): at 15:57

## 2018-12-01 RX ADMIN — Medication 4 UNIT(S): at 11:13

## 2018-12-01 RX ADMIN — OXYCODONE AND ACETAMINOPHEN 1 TABLET(S): 5; 325 TABLET ORAL at 23:23

## 2018-12-01 RX ADMIN — Medication 100 MILLIGRAM(S): at 05:58

## 2018-12-01 RX ADMIN — Medication 81 MILLIGRAM(S): at 11:38

## 2018-12-01 RX ADMIN — OXYCODONE AND ACETAMINOPHEN 1 TABLET(S): 5; 325 TABLET ORAL at 00:16

## 2018-12-01 RX ADMIN — Medication 100 MILLIGRAM(S): at 21:50

## 2018-12-01 RX ADMIN — OXYCODONE AND ACETAMINOPHEN 1 TABLET(S): 5; 325 TABLET ORAL at 04:30

## 2018-12-01 RX ADMIN — OXYCODONE AND ACETAMINOPHEN 1 TABLET(S): 5; 325 TABLET ORAL at 11:30

## 2018-12-01 RX ADMIN — Medication 1 TABLET(S): at 11:38

## 2018-12-01 RX ADMIN — ERYTHROPOIETIN 10000 UNIT(S): 10000 INJECTION, SOLUTION INTRAVENOUS; SUBCUTANEOUS at 17:36

## 2018-12-01 RX ADMIN — HEPARIN SODIUM 5000 UNIT(S): 5000 INJECTION INTRAVENOUS; SUBCUTANEOUS at 21:50

## 2018-12-01 RX ADMIN — OXYCODONE AND ACETAMINOPHEN 1 TABLET(S): 5; 325 TABLET ORAL at 01:17

## 2018-12-01 RX ADMIN — CLOPIDOGREL BISULFATE 75 MILLIGRAM(S): 75 TABLET, FILM COATED ORAL at 11:39

## 2018-12-01 RX ADMIN — Medication 4 UNIT(S): at 08:51

## 2018-12-01 RX ADMIN — LISINOPRIL 40 MILLIGRAM(S): 2.5 TABLET ORAL at 06:23

## 2018-12-01 RX ADMIN — Medication 8: at 08:52

## 2018-12-01 RX ADMIN — OXYCODONE AND ACETAMINOPHEN 1 TABLET(S): 5; 325 TABLET ORAL at 12:00

## 2018-12-01 RX ADMIN — ONDANSETRON 4 MILLIGRAM(S): 8 TABLET, FILM COATED ORAL at 16:48

## 2018-12-01 RX ADMIN — INSULIN GLARGINE 13 UNIT(S): 100 INJECTION, SOLUTION SUBCUTANEOUS at 13:17

## 2018-12-01 RX ADMIN — CINACALCET 60 MILLIGRAM(S): 30 TABLET, FILM COATED ORAL at 11:38

## 2018-12-01 RX ADMIN — SODIUM CHLORIDE 100 MILLILITER(S): 9 INJECTION INTRAMUSCULAR; INTRAVENOUS; SUBCUTANEOUS at 11:32

## 2018-12-01 RX ADMIN — OXYCODONE AND ACETAMINOPHEN 1 TABLET(S): 5; 325 TABLET ORAL at 03:32

## 2018-12-01 RX ADMIN — Medication 12: at 11:13

## 2018-12-01 RX ADMIN — SEVELAMER CARBONATE 2400 MILLIGRAM(S): 2400 POWDER, FOR SUSPENSION ORAL at 11:38

## 2018-12-01 NOTE — PROGRESS NOTE ADULT - SUBJECTIVE AND OBJECTIVE BOX
[facility]   [phone]  DIALYSIS NOTE  Chief Complaint: ESRD/Ongoing hemodialysis requirement.     24 hour events/subjective:    states today is a better day         ALLERGIES & MEDICATIONS  --------------------------------------------------------------------------------  Allergies    No Known Allergies    Intolerances      Standing Inpatient Medications  aspirin enteric coated 81 milliGRAM(s) Oral daily  atorvastatin 20 milliGRAM(s) Oral at bedtime  cinacalcet 60 milliGRAM(s) Oral daily  clopidogrel Tablet 75 milliGRAM(s) Oral daily  dextrose 5%. 1000 milliLiter(s) IV Continuous <Continuous>  dextrose 5%. 1000 milliLiter(s) IV Continuous <Continuous>  dextrose 50% Injectable 12.5 Gram(s) IV Push once  dextrose 50% Injectable 25 Gram(s) IV Push once  dextrose 50% Injectable 25 Gram(s) IV Push once  dextrose 50% Injectable 12.5 Gram(s) IV Push once  dextrose 50% Injectable 25 Gram(s) IV Push once  dextrose 50% Injectable 25 Gram(s) IV Push once  DULoxetine 60 milliGRAM(s) Oral daily  epoetin azalea Injectable 27153 Unit(s) IV Push once  heparin  Injectable 5000 Unit(s) SubCutaneous every 12 hours  hydrALAZINE 100 milliGRAM(s) Oral every 8 hours  insulin glargine Injectable (LANTUS) 9 Unit(s) SubCutaneous <User Schedule>  insulin lispro (HumaLOG) corrective regimen sliding scale   SubCutaneous three times a day before meals  insulin lispro (HumaLOG) corrective regimen sliding scale   SubCutaneous at bedtime  insulin lispro Injectable (HumaLOG) 4 Unit(s) SubCutaneous before breakfast  insulin lispro Injectable (HumaLOG) 4 Unit(s) SubCutaneous before lunch  insulin lispro Injectable (HumaLOG) 4 Unit(s) SubCutaneous before dinner  lisinopril 40 milliGRAM(s) Oral daily  metoprolol succinate ER 50 milliGRAM(s) Oral daily  multivitamin 1 Tablet(s) Oral daily  ondansetron Injectable 4 milliGRAM(s) IV Push once  sevelamer hydrochloride 2400 milliGRAM(s) Oral two times a day with meals  sodium chloride 0.9%. 1000 milliLiter(s) IV Continuous <Continuous>    PRN Inpatient Medications  dextrose 40% Gel 15 Gram(s) Oral once PRN  dextrose 40% Gel 15 Gram(s) Oral once PRN  glucagon  Injectable 1 milliGRAM(s) IntraMuscular once PRN  glucagon  Injectable 1 milliGRAM(s) IntraMuscular once PRN  oxyCODONE    5 mG/acetaminophen 325 mG 1 Tablet(s) Oral every 8 hours PRN      REVIEW OF SYSTEMS  --------------------------------------------------------------------------------  no itching or rash  no fever or chill  no cp or palp   no sob or cough   no N/V/D/ no abd pain   ext no edema         VITALS/PHYSICAL EXAM  --------------------------------------------------------------------------------  T(C): 36.8 (12-01-18 @ 13:51), Max: 37.1 (11-30-18 @ 23:32)  HR: 82 (12-01-18 @ 13:51) (71 - 83)  BP: 125/69 (12-01-18 @ 13:51) (125/69 - 179/85)  RR: 18 (12-01-18 @ 13:51) (18 - 18)  SpO2: 98% (12-01-18 @ 13:51) (94% - 99%)  Wt(kg): --  Height (cm): 154.94 (11-30-18 @ 10:22)  Weight (kg): 49.9 (11-30-18 @ 10:22)  BMI (kg/m2): 20.8 (11-30-18 @ 10:22)  BSA (m2): 1.47 (11-30-18 @ 10:22)      11-30-18 @ 07:01  -  12-01-18 @ 07:00  --------------------------------------------------------  IN: 800 mL / OUT: 1800 mL / NET: -1000 mL      Physical Exam:  		  	Gen: + chronically ill appearing F   	no jvd ,  	Pulm: decrease bs  no rales or ronchi or wheezing  	CV: RRR, S1S2; no rub  	Abd: +BS, soft, nontender/nondistended  	: No suprapubic tenderness  	UE: Warm, no cyanosis  no clubbing,  no edema  	LE: Warm, no cyanosis  no clubbing, 1+ LLE  edema  	Neuro: alert and oriented. speech coherent   Skin: Warm, no decrease skin turgor   	Vascular access: + avf + bruit and thrill       LABS/STUDIES  --------------------------------------------------------------------------------              9.2    6.27  >-----------<  250      [12-01-18 @ 08:36]              29.1     133  |  89  |  14  ----------------------------<  301      [12-01-18 @ 11:35]  3.9   |  24  |  3.43        Ca     8.6     [12-01-18 @ 11:35]    TPro  7.8  /  Alb  4.6  /  TBili  0.3  /  DBili  x   /  AST  56  /  ALT  28  /  AlkPhos  92  [11-30-18 @ 11:12]    PT/INR: PT 11.6 , INR 1.01       [11-30-18 @ 11:12]  PTT: 28.9       [11-30-18 @ 11:12]

## 2018-12-01 NOTE — PROGRESS NOTE ADULT - SUBJECTIVE AND OBJECTIVE BOX
Chief Complaint/Follow-up on:   T1DM    Subjective:  Patient was seen and examined at bedside, pt is nauseous and at weak. Her BG are labile, above control in and hypoglycemia episodes in afternoon. Pt is very sensitive to small changes in insulin.     Interval Hx:  64yo F h/o DM1 (has insulin pump, non-adherent) w frequent hospitalizations for DKA, ESRD on HD (LUE AVF, M/T/Th/Sa, last HD yesterday morning), CAD s/p  stents, HTN HLD, CVA, PVD presents from home w chills, weakness, fatigue, likely viral illness with hyperglycemia. Currently off the pump, her BG are labile on basal + bolus.     MEDICATIONS  (STANDING):  aspirin enteric coated 81 milliGRAM(s) Oral daily  atorvastatin 20 milliGRAM(s) Oral at bedtime  cinacalcet 60 milliGRAM(s) Oral daily  clopidogrel Tablet 75 milliGRAM(s) Oral daily  dextrose 5%. 1000 milliLiter(s) (50 mL/Hr) IV Continuous <Continuous>  dextrose 5%. 1000 milliLiter(s) (50 mL/Hr) IV Continuous <Continuous>  dextrose 50% Injectable 12.5 Gram(s) IV Push once  dextrose 50% Injectable 25 Gram(s) IV Push once  dextrose 50% Injectable 25 Gram(s) IV Push once  dextrose 50% Injectable 12.5 Gram(s) IV Push once  dextrose 50% Injectable 25 Gram(s) IV Push once  dextrose 50% Injectable 25 Gram(s) IV Push once  DULoxetine 60 milliGRAM(s) Oral daily  epoetin azalea Injectable 81036 Unit(s) IV Push once  heparin  Injectable 5000 Unit(s) SubCutaneous every 12 hours  hydrALAZINE 100 milliGRAM(s) Oral every 8 hours  insulin glargine Injectable (LANTUS) 13 Unit(s) SubCutaneous <User Schedule>  insulin lispro (HumaLOG) corrective regimen sliding scale   SubCutaneous three times a day before meals  insulin lispro (HumaLOG) corrective regimen sliding scale   SubCutaneous at bedtime  insulin lispro Injectable (HumaLOG) 5 Unit(s) SubCutaneous before breakfast  insulin lispro Injectable (HumaLOG) 5 Unit(s) SubCutaneous before lunch  insulin lispro Injectable (HumaLOG) 5 Unit(s) SubCutaneous before dinner  lisinopril 40 milliGRAM(s) Oral daily  metoprolol succinate ER 50 milliGRAM(s) Oral daily  multivitamin 1 Tablet(s) Oral daily  sevelamer hydrochloride 2400 milliGRAM(s) Oral two times a day with meals  sodium chloride 0.9%. 1000 milliLiter(s) (100 mL/Hr) IV Continuous <Continuous>    MEDICATIONS  (PRN):  dextrose 40% Gel 15 Gram(s) Oral once PRN Blood Glucose LESS THAN 70 milliGRAM(s)/deciliter  dextrose 40% Gel 15 Gram(s) Oral once PRN Blood Glucose LESS THAN 70 milliGRAM(s)/deciLiter  glucagon  Injectable 1 milliGRAM(s) IntraMuscular once PRN Glucose LESS THAN 70 milligrams/deciliter  glucagon  Injectable 1 milliGRAM(s) IntraMuscular once PRN Glucose <70 milliGRAM(s)/deciLiter  oxyCODONE    5 mG/acetaminophen 325 mG 1 Tablet(s) Oral every 8 hours PRN Moderate Pain (4 - 6)      PHYSICAL EXAM:  VITALS: T(C): 36.8 (12-01-18 @ 13:51)  T(F): 98.2 (12-01-18 @ 13:51), Max: 98.8 (11-30-18 @ 23:32)  HR: 82 (12-01-18 @ 13:51) (71 - 83)  BP: 125/69 (12-01-18 @ 13:51) (125/69 - 179/85)  RR:  (18 - 18)  SpO2:  (94% - 99%)  Wt(kg): --  GENERAL: NAD, well-groomed, well-developed  EYES: No proptosis, no injection  HEENT:  Atraumatic, Normocephalic, moist mucous membranes  THYROID: Normal size, no palpable nodules  RESPIRATORY: Clear to auscultation bilaterally; No rales, rhonchi, wheezing, or rubs  CARDIOVASCULAR: Regular rate and rhythm; No murmurs; no peripheral edema  GI: Soft, nontender, non distended, normal bowel sounds  CUSHING'S SIGNS: no striae    POCT Blood Glucose.: 66 mg/dL (12-01-18 @ 15:55)  POCT Blood Glucose.: 58 mg/dL (12-01-18 @ 15:37)  POCT Blood Glucose.: 416 mg/dL (12-01-18 @ 11:09)  POCT Blood Glucose.: 334 mg/dL (12-01-18 @ 08:14)  POCT Blood Glucose.: 113 mg/dL (11-30-18 @ 23:37)  POCT Blood Glucose.: 104 mg/dL (11-30-18 @ 18:04)  POCT Blood Glucose.: 135 mg/dL (11-30-18 @ 15:48)  POCT Blood Glucose.: 185 mg/dL (11-30-18 @ 14:44)  POCT Blood Glucose.: 338 mg/dL (11-30-18 @ 13:27)  POCT Blood Glucose.: 406 mg/dL (11-30-18 @ 12:04)  POCT Blood Glucose.: 357 mg/dL (11-30-18 @ 10:49)    12-01    133<L>  |  89<L>  |  14  ----------------------------<  301<H>  3.9   |  24  |  3.43<H>    EGFR if : 16<L>  EGFR if non : 14<L>    Ca    8.6      12-01    TPro  7.8  /  Alb  4.6  /  TBili  0.3  /  DBili  x   /  AST  56<H>  /  ALT  28  /  AlkPhos  92  11-30          Thyroid Function Tests:  11-17 @ 08:25 TSH 0.71 FreeT4 -- T3 -- Anti TPO -- Anti Thyroglobulin Ab -- TSI --      Hemoglobin A1C, Whole Blood: 8.6 % <H> [4.0 - 5.6] (11-16-18 @ 20:14)  Hemoglobin A1C, Whole Blood: 9.1 % <H> [4.0 - 5.6] (10-16-18 @ 08:13) Chief Complaint/Follow-up on:   T1DM    Subjective:  Patient was seen and examined at bedside, pt is nauseous and at weak. Her BG are labile, above control in and hypoglycemia episodes in afternoon. Pt is very sensitive to small changes in insulin.     Interval Hx:  64yo F h/o DM1 (has insulin pump, non-adherent) w frequent hospitalizations for DKA, ESRD on HD (LUE AVF, M/T/Th/Sa, last HD yesterday morning), CAD s/p  stents, HTN HLD, CVA, PVD presents from home w chills, weakness, fatigue, likely viral illness with hyperglycemia. Currently off the pump, her BG are labile on basal + bolus.     MEDICATIONS  (STANDING):  aspirin enteric coated 81 milliGRAM(s) Oral daily  atorvastatin 20 milliGRAM(s) Oral at bedtime  cinacalcet 60 milliGRAM(s) Oral daily  clopidogrel Tablet 75 milliGRAM(s) Oral daily  dextrose 5%. 1000 milliLiter(s) (50 mL/Hr) IV Continuous <Continuous>  dextrose 5%. 1000 milliLiter(s) (50 mL/Hr) IV Continuous <Continuous>  dextrose 50% Injectable 12.5 Gram(s) IV Push once  dextrose 50% Injectable 25 Gram(s) IV Push once  dextrose 50% Injectable 25 Gram(s) IV Push once  dextrose 50% Injectable 12.5 Gram(s) IV Push once  dextrose 50% Injectable 25 Gram(s) IV Push once  dextrose 50% Injectable 25 Gram(s) IV Push once  DULoxetine 60 milliGRAM(s) Oral daily  epoetin azalea Injectable 92055 Unit(s) IV Push once  heparin  Injectable 5000 Unit(s) SubCutaneous every 12 hours  hydrALAZINE 100 milliGRAM(s) Oral every 8 hours  insulin glargine Injectable (LANTUS) 13 Unit(s) SubCutaneous <User Schedule>  insulin lispro (HumaLOG) corrective regimen sliding scale   SubCutaneous three times a day before meals  insulin lispro (HumaLOG) corrective regimen sliding scale   SubCutaneous at bedtime  insulin lispro Injectable (HumaLOG) 5 Unit(s) SubCutaneous before breakfast  insulin lispro Injectable (HumaLOG) 5 Unit(s) SubCutaneous before lunch  insulin lispro Injectable (HumaLOG) 5 Unit(s) SubCutaneous before dinner  lisinopril 40 milliGRAM(s) Oral daily  metoprolol succinate ER 50 milliGRAM(s) Oral daily  multivitamin 1 Tablet(s) Oral daily  sevelamer hydrochloride 2400 milliGRAM(s) Oral two times a day with meals  sodium chloride 0.9%. 1000 milliLiter(s) (100 mL/Hr) IV Continuous <Continuous>    MEDICATIONS  (PRN):  dextrose 40% Gel 15 Gram(s) Oral once PRN Blood Glucose LESS THAN 70 milliGRAM(s)/deciliter  dextrose 40% Gel 15 Gram(s) Oral once PRN Blood Glucose LESS THAN 70 milliGRAM(s)/deciLiter  glucagon  Injectable 1 milliGRAM(s) IntraMuscular once PRN Glucose LESS THAN 70 milligrams/deciliter  glucagon  Injectable 1 milliGRAM(s) IntraMuscular once PRN Glucose <70 milliGRAM(s)/deciLiter  oxyCODONE    5 mG/acetaminophen 325 mG 1 Tablet(s) Oral every 8 hours PRN Moderate Pain (4 - 6)      PHYSICAL EXAM:  VITALS: T(C): 36.8 (12-01-18 @ 13:51)  T(F): 98.2 (12-01-18 @ 13:51), Max: 98.8 (11-30-18 @ 23:32)  HR: 82 (12-01-18 @ 13:51) (71 - 83)  BP: 125/69 (12-01-18 @ 13:51) (125/69 - 179/85)  RR:  (18 - 18)  SpO2:  (94% - 99%)    GENERAL: NAD, well-groomed, well-developed  RESPIRATORY: Clear to auscultation bilaterally; No rales, rhonchi, wheezing, or rubs  CARDIOVASCULAR: Regular rate and rhythm; No murmurs; no peripheral edema  GI: Soft, nontender, non distended, normal bowel sounds    POCT Blood Glucose.: 66 mg/dL (12-01-18 @ 15:55)  POCT Blood Glucose.: 58 mg/dL (12-01-18 @ 15:37)  POCT Blood Glucose.: 416 mg/dL (12-01-18 @ 11:09)  POCT Blood Glucose.: 334 mg/dL (12-01-18 @ 08:14)  POCT Blood Glucose.: 113 mg/dL (11-30-18 @ 23:37)  POCT Blood Glucose.: 104 mg/dL (11-30-18 @ 18:04)  POCT Blood Glucose.: 135 mg/dL (11-30-18 @ 15:48)  POCT Blood Glucose.: 185 mg/dL (11-30-18 @ 14:44)  POCT Blood Glucose.: 338 mg/dL (11-30-18 @ 13:27)  POCT Blood Glucose.: 406 mg/dL (11-30-18 @ 12:04)  POCT Blood Glucose.: 357 mg/dL (11-30-18 @ 10:49)    12-01    133<L>  |  89<L>  |  14  ----------------------------<  301<H>  3.9   |  24  |  3.43<H>    EGFR if : 16<L>  EGFR if non : 14<L>    Ca    8.6      12-01    TPro  7.8  /  Alb  4.6  /  TBili  0.3  /  DBili  x   /  AST  56<H>  /  ALT  28  /  AlkPhos  92  11-30          Thyroid Function Tests:  11-17 @ 08:25 TSH 0.71 FreeT4 -- T3 -- Anti TPO -- Anti Thyroglobulin Ab -- TSI --      Hemoglobin A1C, Whole Blood: 8.6 % <H> [4.0 - 5.6] (11-16-18 @ 20:14)  Hemoglobin A1C, Whole Blood: 9.1 % <H> [4.0 - 5.6] (10-16-18 @ 08:13)

## 2018-12-01 NOTE — CONSULT NOTE ADULT - CONSULT REASON
Uncontrolled Type 1 DM on insulin pump w/ hyperglycemia
elevated glucose
esrd/acidosis high agap
weakness

## 2018-12-01 NOTE — CONSULT NOTE ADULT - SUBJECTIVE AND OBJECTIVE BOX
CHIEF COMPLAINT: weakness, nausea    HISTORY OF PRESENT ILLNESS:  63 YOF CAD, CHF, HD MTThSat, last had dialysis yesterday 2L off which was normal for pt. Pt states she has been feeling unwell with nasal congestion, slight cough and chills since yesterday night. Pt denies any abdominal pain, denies fever, denies chest pain, denies sore throat. Pt has medtronic insulin pump basal rate 1.8u/hr    Allergies  No Known Allergies      MEDICATIONS:  aspirin enteric coated 81 milliGRAM(s) Oral daily  clopidogrel Tablet 75 milliGRAM(s) Oral daily  heparin  Injectable 5000 Unit(s) SubCutaneous every 12 hours  hydrALAZINE 100 milliGRAM(s) Oral every 8 hours  lisinopril 40 milliGRAM(s) Oral daily  metoprolol succinate ER 50 milliGRAM(s) Oral daily  DULoxetine 60 milliGRAM(s) Oral daily  oxyCODONE    5 mG/acetaminophen 325 mG 1 Tablet(s) Oral every 8 hours PRN      atorvastatin 20 milliGRAM(s) Oral at bedtime  cinacalcet 60 milliGRAM(s) Oral daily  dextrose 40% Gel 15 Gram(s) Oral once PRN  dextrose 40% Gel 15 Gram(s) Oral once PRN  dextrose 50% Injectable 12.5 Gram(s) IV Push once  dextrose 50% Injectable 25 Gram(s) IV Push once  dextrose 50% Injectable 25 Gram(s) IV Push once  dextrose 50% Injectable 12.5 Gram(s) IV Push once  dextrose 50% Injectable 25 Gram(s) IV Push once  dextrose 50% Injectable 25 Gram(s) IV Push once  glucagon  Injectable 1 milliGRAM(s) IntraMuscular once PRN  glucagon  Injectable 1 milliGRAM(s) IntraMuscular once PRN  insulin glargine Injectable (LANTUS) 13 Unit(s) SubCutaneous <User Schedule>  insulin lispro (HumaLOG) corrective regimen sliding scale   SubCutaneous three times a day before meals  insulin lispro (HumaLOG) corrective regimen sliding scale   SubCutaneous at bedtime  insulin lispro Injectable (HumaLOG) 5 Unit(s) SubCutaneous before breakfast  insulin lispro Injectable (HumaLOG) 5 Unit(s) SubCutaneous before lunch  insulin lispro Injectable (HumaLOG) 5 Unit(s) SubCutaneous before dinner    dextrose 5%. 1000 milliLiter(s) IV Continuous <Continuous>  dextrose 5%. 1000 milliLiter(s) IV Continuous <Continuous>  epoetin azalea Injectable 81101 Unit(s) IV Push once  multivitamin 1 Tablet(s) Oral daily  sodium chloride 0.9%. 1000 milliLiter(s) IV Continuous <Continuous>      PAST MEDICAL & SURGICAL HISTORY:  CHF (congestive heart failure): EF 40-45%  Subclavian vein stenosis, left: s/p stent  DKA, type 1: 1/2015  ACS (acute coronary syndrome): 1/2015 - cath revealed 100% ostial stenosis not amenable to PCI - medical management  TIA (transient ischemic attack): x 2 - 8-9 years ago prior to ASD/VSD repair  CAD (coronary artery disease): s/p stents  Gout: past  CVA (cerebral infarction): with no residual, 8 yrs ago, prior to heart surgery - ST memory loss  Peripheral vascular disease: occluded left fem-pop bypass 5/2015  Diabetes mellitus type 1: Insulin Dependent - Medtronic  Minimed Paradigm Insulin Pump - Novolog  ESRD (end stage renal disease): dialysis  M, tue, thursday, saturday  Hyperlipidemia  Status post device closure of ASD: &quot;clamshell&quot;  History of cardiac catheterization: 1/2015 - no intervention  S/P femoral-popliteal bypass surgery: L and R in 2013 with graft; 5/2015 CFA angioplasty left and ileofemoral endarterectomywith vein patch angioplasty of left fem-pop bypass graft  Multiple vascular surgery both leg, left fempop bypass revision 11/2015  AV (arteriovenous fistula): Left AV graft; revision with stent placement 2-3 years ago  S/P cholecystectomy      FAMILY HISTORY:  Family history of smoking  Family history of hypertension  Family history of cancer (Sibling)      SOCIAL HISTORY:    former smoker. lives with     REVIEW OF SYSTEMS:  See HPI, otherwise complete 10 point review of systems negative    [ ] All others negative	    PHYSICAL EXAM:  T(C): 36.8 (12-01-18 @ 13:51), Max: 37.1 (11-30-18 @ 23:32)  HR: 82 (12-01-18 @ 13:51) (71 - 83)  BP: 125/69 (12-01-18 @ 13:51) (125/69 - 182/85)  RR: 18 (12-01-18 @ 13:51) (16 - 18)  SpO2: 98% (12-01-18 @ 13:51) (94% - 100%)  Wt(kg): --  I&O's Summary    30 Nov 2018 07:01  -  01 Dec 2018 07:00  --------------------------------------------------------  IN: 800 mL / OUT: 1800 mL / NET: -1000 mL        Appearance: No Acute Distress	  HEENT:  Normal oral mucosa, PERRL, EOMI	  Cardiovascular: Normal S1 S2, No JVD, No murmurs/rubs/gallops  Respiratory: Lungs clear to auscultation bilaterally  Gastrointestinal:  Soft, Non-tender, + BS	  Skin: No rashes, No ecchymoses, No cyanosis	  Neurologic: Non-focal  Extremities: No clubbing, cyanosis or edema  Vascular: Peripheral pulses palpable 2+ bilaterally  Psychiatry: A & O x 3, Mood & affect appropriate    Laboratory Data:	 	    CBC Full  -  ( 01 Dec 2018 08:36 )  WBC Count : 6.27 K/uL  Hemoglobin : 9.2 g/dL  Hematocrit : 29.1 %  Platelet Count - Automated : 250 K/uL  Mean Cell Volume : 99.7 fl  Mean Cell Hemoglobin : 31.5 pg  Mean Cell Hemoglobin Concentration : 31.6 gm/dL  Auto Neutrophil # : x  Auto Lymphocyte # : x  Auto Monocyte # : x  Auto Eosinophil # : x  Auto Basophil # : x  Auto Neutrophil % : x  Auto Lymphocyte % : x  Auto Monocyte % : x  Auto Eosinophil % : x  Auto Basophil % : x    12-01    133<L>  |  89<L>  |  14  ----------------------------<  301<H>  3.9   |  24  |  3.43<H>  12-01    134<L>  |  91<L>  |  13  ----------------------------<  323<H>  4.4   |  23  |  3.08<H>    Ca    8.6      01 Dec 2018 11:35  Ca    8.4      01 Dec 2018 06:40    TPro  7.8  /  Alb  4.6  /  TBili  0.3  /  DBili  x   /  AST  56<H>  /  ALT  28  /  AlkPhos  92  11-30          Assessment:  -profound hyperglycemia without DKA  -weakness  -cad s/p multiple stents  -esrd on hd  -PAD s/p prior intervention   -remote TIA  -prolonged QTc      Recs:  -c/w insulin per endo  -no true ischemic sx. ekg with nonspecific ST changes. would defer ischemic work up for now   -c/w asa, statin, bb and hydralazine   -avoid QT prolonging meds - likely medication and electrolyte related  -dvt ppx    Plan of Care:    Greater than 60 minutes spent on total encounter; more than 50% of the visit was spent counseling and/or coordinating care by the attending physician.   	  Julián Biswas MD   Cardiovascular Diseases  [phone]

## 2018-12-01 NOTE — ED PROVIDER NOTE - ABDOMINAL TENDER
Eyes with no visual disturbances.  Ears clean and dry and no hearing difficulties. Nose with pink mucosa and no drainage.  Mouth mucous membranes moist and pink.  No tenderness or swelling to throat or neck.
Diffuse tenderness

## 2018-12-01 NOTE — PROGRESS NOTE ADULT - SUBJECTIVE AND OBJECTIVE BOX
Patient is a 63y old  Female who presents with a chief complaint of weakness (01 Dec 2018 11:06)      SUBJECTIVE / OVERNIGHT EVENTS: feels better  Review of Systems  chest pain no  palpitations no  sob no  nausea no  headache no    MEDICATIONS  (STANDING):  aspirin enteric coated 81 milliGRAM(s) Oral daily  atorvastatin 20 milliGRAM(s) Oral at bedtime  cinacalcet 60 milliGRAM(s) Oral daily  clopidogrel Tablet 75 milliGRAM(s) Oral daily  dextrose 5%. 1000 milliLiter(s) (50 mL/Hr) IV Continuous <Continuous>  dextrose 5%. 1000 milliLiter(s) (50 mL/Hr) IV Continuous <Continuous>  dextrose 50% Injectable 12.5 Gram(s) IV Push once  dextrose 50% Injectable 25 Gram(s) IV Push once  dextrose 50% Injectable 25 Gram(s) IV Push once  dextrose 50% Injectable 12.5 Gram(s) IV Push once  dextrose 50% Injectable 25 Gram(s) IV Push once  dextrose 50% Injectable 25 Gram(s) IV Push once  DULoxetine 60 milliGRAM(s) Oral daily  epoetin azalea Injectable 92022 Unit(s) IV Push once  heparin  Injectable 5000 Unit(s) SubCutaneous every 12 hours  hydrALAZINE 100 milliGRAM(s) Oral every 8 hours  insulin glargine Injectable (LANTUS) 13 Unit(s) SubCutaneous <User Schedule>  insulin lispro (HumaLOG) corrective regimen sliding scale   SubCutaneous three times a day before meals  insulin lispro (HumaLOG) corrective regimen sliding scale   SubCutaneous at bedtime  insulin lispro Injectable (HumaLOG) 5 Unit(s) SubCutaneous before breakfast  insulin lispro Injectable (HumaLOG) 5 Unit(s) SubCutaneous before lunch  insulin lispro Injectable (HumaLOG) 5 Unit(s) SubCutaneous before dinner  lisinopril 40 milliGRAM(s) Oral daily  metoprolol succinate ER 50 milliGRAM(s) Oral daily  multivitamin 1 Tablet(s) Oral daily  sevelamer hydrochloride 2400 milliGRAM(s) Oral two times a day with meals  sodium chloride 0.9%. 1000 milliLiter(s) (100 mL/Hr) IV Continuous <Continuous>    MEDICATIONS  (PRN):  dextrose 40% Gel 15 Gram(s) Oral once PRN Blood Glucose LESS THAN 70 milliGRAM(s)/deciliter  dextrose 40% Gel 15 Gram(s) Oral once PRN Blood Glucose LESS THAN 70 milliGRAM(s)/deciLiter  glucagon  Injectable 1 milliGRAM(s) IntraMuscular once PRN Glucose LESS THAN 70 milligrams/deciliter  glucagon  Injectable 1 milliGRAM(s) IntraMuscular once PRN Glucose <70 milliGRAM(s)/deciLiter  oxyCODONE    5 mG/acetaminophen 325 mG 1 Tablet(s) Oral every 8 hours PRN Moderate Pain (4 - 6)      Vital Signs Last 24 Hrs  T(C): 37.1 (01 Dec 2018 05:52), Max: 37.1 (30 Nov 2018 23:32)  T(F): 98.8 (01 Dec 2018 05:52), Max: 98.8 (30 Nov 2018 23:32)  HR: 75 (01 Dec 2018 05:52) (71 - 83)  BP: 158/73 (01 Dec 2018 05:52) (149/71 - 182/85)  BP(mean): --  RR: 18 (01 Dec 2018 05:52) (16 - 18)  SpO2: 96% (01 Dec 2018 05:52) (94% - 100%)    PHYSICAL EXAM:  GENERAL: NAD, frail  HEAD:  Atraumatic, Normocephalic  EYES: EOMI, PERRLA, conjunctiva and sclera clear  NECK: Supple, No JVD  CHEST/LUNG: Clear to auscultation bilaterally; No wheeze  HEART: Regular rate and rhythm; No murmurs, rubs, or gallops  ABDOMEN: Soft, Nontender, Nondistended; Bowel sounds present  EXTREMITIES:  2+ Peripheral Pulses, No clubbing, cyanosis, or edema  NEUROLOGY: non-focal  SKIN: No rashes or lesions    LABS:                        9.2    6.27  )-----------( 250      ( 01 Dec 2018 08:36 )             29.1     12-01    133<L>  |  89<L>  |  14  ----------------------------<  301<H>  3.9   |  24  |  3.43<H>    Ca    8.6      01 Dec 2018 11:35    TPro  7.8  /  Alb  4.6  /  TBili  0.3  /  DBili  x   /  AST  56<H>  /  ALT  28  /  AlkPhos  92  11-30    PT/INR - ( 30 Nov 2018 11:12 )   PT: 11.6 sec;   INR: 1.01 ratio         PTT - ( 30 Nov 2018 11:12 )  PTT:28.9 sec            RADIOLOGY & ADDITIONAL TESTS:    Imaging Personally Reviewed:    Consultant(s) Notes Reviewed:      Care Discussed with Consultants/Other Providers:

## 2018-12-02 ENCOUNTER — TRANSCRIPTION ENCOUNTER (OUTPATIENT)
Age: 63
End: 2018-12-02

## 2018-12-02 VITALS — SYSTOLIC BLOOD PRESSURE: 125 MMHG | DIASTOLIC BLOOD PRESSURE: 66 MMHG

## 2018-12-02 LAB
ANION GAP SERPL CALC-SCNC: 15 MMOL/L — SIGNIFICANT CHANGE UP (ref 5–17)
BUN SERPL-MCNC: 6 MG/DL — LOW (ref 7–23)
CALCIUM SERPL-MCNC: 8.4 MG/DL — SIGNIFICANT CHANGE UP (ref 8.4–10.5)
CHLORIDE SERPL-SCNC: 92 MMOL/L — LOW (ref 96–108)
CO2 SERPL-SCNC: 28 MMOL/L — SIGNIFICANT CHANGE UP (ref 22–31)
CREAT SERPL-MCNC: 2.18 MG/DL — HIGH (ref 0.5–1.3)
GLUCOSE BLDC GLUCOMTR-MCNC: 163 MG/DL — HIGH (ref 70–99)
GLUCOSE BLDC GLUCOMTR-MCNC: 165 MG/DL — HIGH (ref 70–99)
GLUCOSE BLDC GLUCOMTR-MCNC: 203 MG/DL — HIGH (ref 70–99)
GLUCOSE BLDC GLUCOMTR-MCNC: 213 MG/DL — HIGH (ref 70–99)
GLUCOSE BLDC GLUCOMTR-MCNC: 217 MG/DL — HIGH (ref 70–99)
GLUCOSE SERPL-MCNC: 192 MG/DL — HIGH (ref 70–99)
HCT VFR BLD CALC: 33.8 % — LOW (ref 34.5–45)
HGB BLD-MCNC: 11 G/DL — LOW (ref 11.5–15.5)
MCHC RBC-ENTMCNC: 32.5 GM/DL — SIGNIFICANT CHANGE UP (ref 32–36)
MCHC RBC-ENTMCNC: 32.7 PG — SIGNIFICANT CHANGE UP (ref 27–34)
MCV RBC AUTO: 100.6 FL — HIGH (ref 80–100)
PLATELET # BLD AUTO: 264 K/UL — SIGNIFICANT CHANGE UP (ref 150–400)
POTASSIUM SERPL-MCNC: 3.6 MMOL/L — SIGNIFICANT CHANGE UP (ref 3.5–5.3)
POTASSIUM SERPL-SCNC: 3.6 MMOL/L — SIGNIFICANT CHANGE UP (ref 3.5–5.3)
RBC # BLD: 3.36 M/UL — LOW (ref 3.8–5.2)
RBC # FLD: 14 % — SIGNIFICANT CHANGE UP (ref 10.3–14.5)
SODIUM SERPL-SCNC: 135 MMOL/L — SIGNIFICANT CHANGE UP (ref 135–145)
WBC # BLD: 4.75 K/UL — SIGNIFICANT CHANGE UP (ref 3.8–10.5)
WBC # FLD AUTO: 4.75 K/UL — SIGNIFICANT CHANGE UP (ref 3.8–10.5)

## 2018-12-02 PROCEDURE — 82010 KETONE BODYS QUAN: CPT

## 2018-12-02 PROCEDURE — 82435 ASSAY OF BLOOD CHLORIDE: CPT

## 2018-12-02 PROCEDURE — 82962 GLUCOSE BLOOD TEST: CPT

## 2018-12-02 PROCEDURE — 82330 ASSAY OF CALCIUM: CPT

## 2018-12-02 PROCEDURE — 71045 X-RAY EXAM CHEST 1 VIEW: CPT

## 2018-12-02 PROCEDURE — 85610 PROTHROMBIN TIME: CPT

## 2018-12-02 PROCEDURE — 82803 BLOOD GASES ANY COMBINATION: CPT

## 2018-12-02 PROCEDURE — 99285 EMERGENCY DEPT VISIT HI MDM: CPT | Mod: 25

## 2018-12-02 PROCEDURE — 93005 ELECTROCARDIOGRAM TRACING: CPT

## 2018-12-02 PROCEDURE — 82947 ASSAY GLUCOSE BLOOD QUANT: CPT

## 2018-12-02 PROCEDURE — 87633 RESP VIRUS 12-25 TARGETS: CPT

## 2018-12-02 PROCEDURE — 99261: CPT

## 2018-12-02 PROCEDURE — 87798 DETECT AGENT NOS DNA AMP: CPT

## 2018-12-02 PROCEDURE — 85730 THROMBOPLASTIN TIME PARTIAL: CPT

## 2018-12-02 PROCEDURE — 83605 ASSAY OF LACTIC ACID: CPT

## 2018-12-02 PROCEDURE — 96372 THER/PROPH/DIAG INJ SC/IM: CPT

## 2018-12-02 PROCEDURE — 87581 M.PNEUMON DNA AMP PROBE: CPT

## 2018-12-02 PROCEDURE — 80048 BASIC METABOLIC PNL TOTAL CA: CPT

## 2018-12-02 PROCEDURE — 85014 HEMATOCRIT: CPT

## 2018-12-02 PROCEDURE — 84295 ASSAY OF SERUM SODIUM: CPT

## 2018-12-02 PROCEDURE — 85027 COMPLETE CBC AUTOMATED: CPT

## 2018-12-02 PROCEDURE — 80053 COMPREHEN METABOLIC PANEL: CPT

## 2018-12-02 PROCEDURE — 84132 ASSAY OF SERUM POTASSIUM: CPT

## 2018-12-02 PROCEDURE — 87486 CHLMYD PNEUM DNA AMP PROBE: CPT

## 2018-12-02 RX ORDER — INSULIN LISPRO 100/ML
1 VIAL (ML) SUBCUTANEOUS
Qty: 0 | Refills: 0 | Status: DISCONTINUED | OUTPATIENT
Start: 2018-12-02 | End: 2018-12-02

## 2018-12-02 RX ORDER — OXYCODONE AND ACETAMINOPHEN 5; 325 MG/1; MG/1
1 TABLET ORAL ONCE
Qty: 0 | Refills: 0 | Status: DISCONTINUED | OUTPATIENT
Start: 2018-12-02 | End: 2018-12-02

## 2018-12-02 RX ADMIN — SEVELAMER CARBONATE 2400 MILLIGRAM(S): 2400 POWDER, FOR SUSPENSION ORAL at 12:37

## 2018-12-02 RX ADMIN — Medication 81 MILLIGRAM(S): at 14:52

## 2018-12-02 RX ADMIN — Medication 1: at 10:41

## 2018-12-02 RX ADMIN — CINACALCET 60 MILLIGRAM(S): 30 TABLET, FILM COATED ORAL at 14:51

## 2018-12-02 RX ADMIN — OXYCODONE AND ACETAMINOPHEN 1 TABLET(S): 5; 325 TABLET ORAL at 00:20

## 2018-12-02 RX ADMIN — LISINOPRIL 40 MILLIGRAM(S): 2.5 TABLET ORAL at 14:49

## 2018-12-02 RX ADMIN — DULOXETINE HYDROCHLORIDE 60 MILLIGRAM(S): 30 CAPSULE, DELAYED RELEASE ORAL at 14:51

## 2018-12-02 RX ADMIN — OXYCODONE AND ACETAMINOPHEN 1 TABLET(S): 5; 325 TABLET ORAL at 15:19

## 2018-12-02 RX ADMIN — Medication 50 MILLIGRAM(S): at 05:17

## 2018-12-02 RX ADMIN — Medication 4 UNIT(S): at 10:43

## 2018-12-02 RX ADMIN — Medication 100 MILLIGRAM(S): at 14:54

## 2018-12-02 RX ADMIN — Medication 1 TABLET(S): at 14:52

## 2018-12-02 RX ADMIN — OXYCODONE AND ACETAMINOPHEN 1 TABLET(S): 5; 325 TABLET ORAL at 14:49

## 2018-12-02 RX ADMIN — CLOPIDOGREL BISULFATE 75 MILLIGRAM(S): 75 TABLET, FILM COATED ORAL at 14:50

## 2018-12-02 RX ADMIN — OXYCODONE AND ACETAMINOPHEN 1 TABLET(S): 5; 325 TABLET ORAL at 05:16

## 2018-12-02 RX ADMIN — Medication 100 MILLIGRAM(S): at 07:01

## 2018-12-02 NOTE — DISCHARGE NOTE ADULT - ADDITIONAL INSTRUCTIONS
# H/O CVA- continue aspirin, Plavix and Lipitor. Heart healthy diet     Follow up with Endocrinologist in 2 days   Follow up with your primary and cardiologist in 5-7 days   Make appointments to follow up with your out patient physicians.  Bring all discharge paperwork including discharge medication list to your follow up appointments. # H/O CVA- continue aspirin, Plavix and Lipitor. Heart healthy diet     Follow up with Endocrinologist in 2 days   Follow up with your primary and cardiologist in 5-7 days   Follow up with your nephrologist in one week  Make appointments to follow up with your out patient physicians.  Bring all discharge paperwork including discharge medication list to your follow up appointments.

## 2018-12-02 NOTE — PROGRESS NOTE ADULT - PROBLEM SELECTOR PLAN 1
BG are stable, Pt is asymptomatic  Pt is stable to go home on her original pump settings from endocrine standpoint.   pump settings as documented above  Goal glucose 100-200. No need for tight glycemic control given hypoglycemic unawareness.   Pt is T1DM can not be off basal insulin >1 hr  monitor BG closely and page on call Endocrinology team if necessary    Ciro Fajardo  Endocrinology Fellow   Pager from 9am-5pm- [phone]; from 5pm-9am call [phone]    Outpatient plan -  Outpt. endo follow-up w/ Dr. Ley  Plan to d/c on her pump.
In am pt with uncontrolled BG in 300-400s, BHB abd VBG- wnl, boderline high AG so given 13 units at noon today ( last dose 9 units yesterday afternoon)   Pt is very sensitive to insulin dose increase and dropped to 60 in afternoon  will continue original insulin doses according to her pump settings  c/w Lantus sq 9 units qhs  low correction scale ac + hs   - Humalog sq  4 units with TID meals.   Goal glucose 100-200. No need for tight glycemic control given hypoglycemic unawareness.   Pt is T1DM can not be off basal insulin >1 hr  monitor BG closely and page on call Endocrinology team if necessary  Ciro Fajardo  Endocrinology Fellow   Pager from 9am-5pm- [phone]; from 5pm-9am call [phone]    Outpatient plan -  Outpt. endo follow-up w/ Dr. Ley  Plan to d/c on her pump.

## 2018-12-02 NOTE — PROGRESS NOTE ADULT - SUBJECTIVE AND OBJECTIVE BOX
Cardiovascular Disease Progress Note    Overnight events: No acute events overnight.  feels well. requesting to be discharged. denies any cardiac sx  Otherwise review of systems negative    Objective Findings:  T(C): 36.7 (18 @ 04:59), Max: 36.8 (18 @ 13:51)  HR: 73 (18 @ 06:54) (63 - 82)  BP: 118/63 (18 @ 06:54) (118/63 - 156/64)  RR: 18 (18 @ 04:59) (18 - 18)  SpO2: 97% (18 @ 04:59) (94% - 98%)  Wt(kg): --  Daily     Daily Weight in k.3 (02 Dec 2018 12:09)      Physical Exam:  Gen: NAD  HEENT: EOMI  CV: RRR, normal S1 + S2, no m/r/g  Lungs: CTAB  Abd: soft, non-tender  Ext: No edema    Telemetry:    Laboratory Data:                        11.0   4.75  )-----------( 264      ( 02 Dec 2018 08:10 )             33.8     12    135  |  92<L>  |  6<L>  ----------------------------<  192<H>  3.6   |  28  |  2.18<H>    Ca    8.4      02 Dec 2018 06:07                Inpatient Medications:  MEDICATIONS  (STANDING):  aspirin enteric coated 81 milliGRAM(s) Oral daily  atorvastatin 20 milliGRAM(s) Oral at bedtime  cinacalcet 60 milliGRAM(s) Oral daily  clopidogrel Tablet 75 milliGRAM(s) Oral daily  dextrose 5%. 1000 milliLiter(s) (50 mL/Hr) IV Continuous <Continuous>  dextrose 5%. 1000 milliLiter(s) (50 mL/Hr) IV Continuous <Continuous>  dextrose 50% Injectable 12.5 Gram(s) IV Push once  dextrose 50% Injectable 25 Gram(s) IV Push once  dextrose 50% Injectable 25 Gram(s) IV Push once  dextrose 50% Injectable 12.5 Gram(s) IV Push once  dextrose 50% Injectable 25 Gram(s) IV Push once  dextrose 50% Injectable 25 Gram(s) IV Push once  DULoxetine 60 milliGRAM(s) Oral daily  heparin  Injectable 5000 Unit(s) SubCutaneous every 12 hours  hydrALAZINE 100 milliGRAM(s) Oral every 8 hours  insulin lispro (HumaLOG) corrective regimen sliding scale   SubCutaneous at bedtime  insulin lispro (HumaLOG) Pump 1 Each SubCutaneous Continuous Pump  lisinopril 40 milliGRAM(s) Oral daily  metoprolol succinate ER 50 milliGRAM(s) Oral daily  multivitamin 1 Tablet(s) Oral daily  sevelamer hydrochloride 2400 milliGRAM(s) Oral two times a day with meals      Assessment:  -profound hyperglycemia without DKA  -weakness  -cad s/p multiple stents  -esrd on hd  -PAD s/p prior intervention   -remote TIA  -prolonged QTc      Recs:  -c/w insulin per endo. to resume pump at discharge  -no true ischemic sx. ekg with nonspecific ST changes. would defer ischemic work up for now   -c/w asa, statin, bb and hydralazine   -avoid QT prolonging meds - likely medication and electrolyte related  -dvt ppx      Over 25 minutes spent on total encounter; more than 50% of the visit was spent counseling and/or coordinating care by the attending physician.      Julián Biswas MD   Cardiovascular Disease  [phone]

## 2018-12-02 NOTE — PROGRESS NOTE ADULT - SUBJECTIVE AND OBJECTIVE BOX
Patient is a 63y old  Female who presents with a chief complaint of weak (02 Dec 2018 12:09)      SUBJECTIVE / OVERNIGHT EVENTS: Comfortable without new complaints.   Review of Systems  chest pain no  palpitations no  sob no  nausea no  headache no    MEDICATIONS  (STANDING):  aspirin enteric coated 81 milliGRAM(s) Oral daily  atorvastatin 20 milliGRAM(s) Oral at bedtime  cinacalcet 60 milliGRAM(s) Oral daily  clopidogrel Tablet 75 milliGRAM(s) Oral daily  dextrose 5%. 1000 milliLiter(s) (50 mL/Hr) IV Continuous <Continuous>  dextrose 5%. 1000 milliLiter(s) (50 mL/Hr) IV Continuous <Continuous>  dextrose 50% Injectable 12.5 Gram(s) IV Push once  dextrose 50% Injectable 25 Gram(s) IV Push once  dextrose 50% Injectable 25 Gram(s) IV Push once  dextrose 50% Injectable 12.5 Gram(s) IV Push once  dextrose 50% Injectable 25 Gram(s) IV Push once  dextrose 50% Injectable 25 Gram(s) IV Push once  DULoxetine 60 milliGRAM(s) Oral daily  heparin  Injectable 5000 Unit(s) SubCutaneous every 12 hours  hydrALAZINE 100 milliGRAM(s) Oral every 8 hours  insulin lispro (HumaLOG) corrective regimen sliding scale   SubCutaneous at bedtime  insulin lispro (HumaLOG) Pump 1 Each SubCutaneous Continuous Pump  lisinopril 40 milliGRAM(s) Oral daily  metoprolol succinate ER 50 milliGRAM(s) Oral daily  multivitamin 1 Tablet(s) Oral daily  sevelamer hydrochloride 2400 milliGRAM(s) Oral two times a day with meals    MEDICATIONS  (PRN):  dextrose 40% Gel 15 Gram(s) Oral once PRN Blood Glucose LESS THAN 70 milliGRAM(s)/deciliter  dextrose 40% Gel 15 Gram(s) Oral once PRN Blood Glucose LESS THAN 70 milliGRAM(s)/deciLiter  glucagon  Injectable 1 milliGRAM(s) IntraMuscular once PRN Glucose LESS THAN 70 milligrams/deciliter  glucagon  Injectable 1 milliGRAM(s) IntraMuscular once PRN Glucose <70 milliGRAM(s)/deciLiter  oxyCODONE    5 mG/acetaminophen 325 mG 1 Tablet(s) Oral every 8 hours PRN Moderate Pain (4 - 6)      Vital Signs Last 24 Hrs  T(C): 36.7 (02 Dec 2018 04:59), Max: 36.8 (01 Dec 2018 13:51)  T(F): 98.1 (02 Dec 2018 04:59), Max: 98.2 (01 Dec 2018 13:51)  HR: 73 (02 Dec 2018 06:54) (63 - 82)  BP: 118/63 (02 Dec 2018 06:54) (118/63 - 156/64)  BP(mean): --  RR: 18 (02 Dec 2018 04:59) (18 - 18)  SpO2: 97% (02 Dec 2018 04:59) (94% - 98%)    PHYSICAL EXAM:  GENERAL: NAD  HEAD:  Atraumatic, Normocephalic  EYES: EOMI, PERRLA, conjunctiva and sclera clear  NECK: Supple, No JVD  CHEST/LUNG: Clear to auscultation bilaterally; No wheeze  HEART: Regular rate and rhythm; No murmurs, rubs, or gallops  ABDOMEN: Soft, Nontender, Nondistended; Bowel sounds present  EXTREMITIES:  2+ Peripheral Pulses, No clubbing, cyanosis, or edema  PSYCH: AAOx3  NEUROLOGY: non-focal  SKIN: No rashes or lesions    LABS:                        11.0   4.75  )-----------( 264      ( 02 Dec 2018 08:10 )             33.8     12-02    135  |  92<L>  |  6<L>  ----------------------------<  192<H>  3.6   |  28  |  2.18<H>    Ca    8.4      02 Dec 2018 06:07                  RADIOLOGY & ADDITIONAL TESTS:    Imaging Personally Reviewed:    Consultant(s) Notes Reviewed:      Care Discussed with Consultants/Other Providers:

## 2018-12-02 NOTE — DISCHARGE NOTE ADULT - CARE PROVIDER_API CALL
Pina Ley), EndocrinologyMetabDiabetes  [address] [place] [address]  Phone: [phone]  Fax: [phone]    Daisy Burger (DO), Nephrology  [address] [address]  Phone: [phone]  Fax: [phone]    Tee Biswas), Cardiovascular Disease; Internal Medicine  [address] [place] [address]  Phone: [phone]  Fax: [phone] Pina Ley), EndocrinologyMetabDiabetes  [address] [place] [address]  Phone: [phone]  Fax: [phone]    Tee Biswas), Cardiovascular Disease; Internal Medicine  [address] [place] [address]  Phone: [phone]  Fax: [phone]

## 2018-12-02 NOTE — DISCHARGE NOTE ADULT - MEDICATION SUMMARY - MEDICATIONS TO TAKE
I will START or STAY ON the medications listed below when I get home from the hospital:    aspirin 81 mg oral delayed release tablet  -- 1 tab(s) by mouth once a day  -- Indication: For CAD (coronary artery disease), storke     oxyCODONE-acetaminophen 5 mg-325 mg oral tablet  -- 1 tab(s) by mouth 1 to 2 times a day, As Needed  -- Indication: For Pain     lisinopril 40 mg oral tablet  -- 1 tab(s) by mouth once a day  -- Indication: For Blood pressure control    DULoxetine 60 mg oral delayed release capsule  -- 1 cap(s) by mouth once a day  -- Indication: For Depression     HumaLOG 100 units/mL injectable solution  -- via insulin pump  -- Indication: For Type 1 diabetes mellitus with hyperglycemia    atorvastatin 20 mg oral tablet  -- 1 tab(s) by mouth once a day (at bedtime)  -- Indication: For CHolesterol control    clopidogrel 75 mg oral tablet  -- 1 tab(s) by mouth once a day  -- Indication: For CAD (coronary artery disease), CVA    Metoprolol Succinate ER 50 mg oral tablet, extended release  -- 1 tab(s) by mouth once a day  -- Indication: For Heart rate and blood pressure control    Sensipar 60 mg oral tablet  -- 1 tab(s) by mouth once a day  -- Indication: For ESRD (end stage renal disease) on dialysis    Renvela 800 mg oral tablet  -- 3 tab(s) by mouth  (with meals)  -- Indication: For ESRD (end stage renal disease) on dialysis    hydrALAZINE 25 mg oral tablet  -- 4 tab(s) by mouth every 8 hours  -- Indication: For Blood pressure contrrol    Multiple Vitamins oral tablet  -- 1 tab(s) by mouth once a day  -- Indication: For Supplement

## 2018-12-02 NOTE — DISCHARGE NOTE ADULT - PATIENT PORTAL LINK FT
You can access the [facility], offered by [facility], by registering with the following website: [email or web address]

## 2018-12-02 NOTE — DISCHARGE NOTE ADULT - PROVIDER TOKENS
TOKESSENCE:'[id number]',TOKESSENCE:'[id number]',TOKESSENCE:'[id number]' MAYA:'[id number]',MAYA:'[id number]'

## 2018-12-02 NOTE — DISCHARGE NOTE ADULT - PLAN OF CARE
Glucose control without hypoglycemia Mild DKA on admission, now resolved  You were evaluated by Endocrine doctor, Monitored off Insulin pump, on Lantus.  Resume original pump settings as per Endocrinologist  Monitor FSs closely  Please follow up with your Endocrinologist in two days Continue HD as scheduled  Follow up with your nephrologist in one week Weigh yourself daily.  If you gain 3lbs in 3 days, or 5lbs in a week call your Health Care Provider.  Do not eat or drink foods containing more than 2000mg of salt (sodium) in your diet every day.  Call your Health Care Provider if you have any swelling or increased swelling in your feet, ankles, and/or stomach.  The Pt was provided with CHF diet instruction (low sodium diet, daily weights, label reading, Heart Healthy Cooking Tips & Heart Healthy shopping Tips).  Take all of your medication as directed.  If you become dizzy call your Health Care Provider. Continue Aspirin, Plavix and Lipitor   Follow up with your cardiologist Continue Aspirin and Plavix Continue Cymbalta as directed Stable.  Follow up with your primary care physician

## 2018-12-02 NOTE — DISCHARGE NOTE ADULT - SECONDARY DIAGNOSIS.
ESRD (end stage renal disease) on dialysis CHF (congestive heart failure) CAD (coronary artery disease) Peripheral vascular disease Depression Gout

## 2018-12-02 NOTE — PROGRESS NOTE ADULT - SUBJECTIVE AND OBJECTIVE BOX
Chief Complaint/Follow-up on:   T1DM on insulin pump at home    Subjective:  Pt feels better today, she denies any nausea, vomiting, abd pain. She wants go back on her pump and go home.     Pump Settings:  Basal:  12am 0.275  5am 0.375    I:C 15    Sensitivity:  12AM 60  7AM 40  6PM 60    Target   12am 110-130  8am 100-120    MEDICATIONS  (STANDING):  aspirin enteric coated 81 milliGRAM(s) Oral daily  atorvastatin 20 milliGRAM(s) Oral at bedtime  cinacalcet 60 milliGRAM(s) Oral daily  clopidogrel Tablet 75 milliGRAM(s) Oral daily  dextrose 5%. 1000 milliLiter(s) (50 mL/Hr) IV Continuous <Continuous>  dextrose 5%. 1000 milliLiter(s) (50 mL/Hr) IV Continuous <Continuous>  dextrose 50% Injectable 12.5 Gram(s) IV Push once  dextrose 50% Injectable 25 Gram(s) IV Push once  dextrose 50% Injectable 25 Gram(s) IV Push once  dextrose 50% Injectable 12.5 Gram(s) IV Push once  dextrose 50% Injectable 25 Gram(s) IV Push once  dextrose 50% Injectable 25 Gram(s) IV Push once  DULoxetine 60 milliGRAM(s) Oral daily  heparin  Injectable 5000 Unit(s) SubCutaneous every 12 hours  hydrALAZINE 100 milliGRAM(s) Oral every 8 hours  insulin lispro (HumaLOG) corrective regimen sliding scale   SubCutaneous at bedtime  insulin lispro (HumaLOG) Pump 1 Each SubCutaneous Continuous Pump  lisinopril 40 milliGRAM(s) Oral daily  metoprolol succinate ER 50 milliGRAM(s) Oral daily  multivitamin 1 Tablet(s) Oral daily  sevelamer hydrochloride 2400 milliGRAM(s) Oral two times a day with meals    MEDICATIONS  (PRN):  dextrose 40% Gel 15 Gram(s) Oral once PRN Blood Glucose LESS THAN 70 milliGRAM(s)/deciliter  dextrose 40% Gel 15 Gram(s) Oral once PRN Blood Glucose LESS THAN 70 milliGRAM(s)/deciLiter  glucagon  Injectable 1 milliGRAM(s) IntraMuscular once PRN Glucose LESS THAN 70 milligrams/deciliter  glucagon  Injectable 1 milliGRAM(s) IntraMuscular once PRN Glucose <70 milliGRAM(s)/deciLiter  oxyCODONE    5 mG/acetaminophen 325 mG 1 Tablet(s) Oral every 8 hours PRN Moderate Pain (4 - 6)      PHYSICAL EXAM:  VITALS: T(C): 36.7 (12-02-18 @ 04:59)  T(F): 98.1 (12-02-18 @ 04:59), Max: 98.2 (12-01-18 @ 13:51)  HR: 73 (12-02-18 @ 06:54) (63 - 82)  BP: 118/63 (12-02-18 @ 06:54) (118/63 - 156/64)  RR:  (18 - 18)  SpO2:  (94% - 98%)    GENERAL: NAD, well-groomed, well-developed  RESPIRATORY: Clear to auscultation bilaterally; No rales, rhonchi, wheezing, or rubs  CARDIOVASCULAR: Regular rate and rhythm; No murmurs; no peripheral edema  GI: Soft, nontender, non distended, normal bowel sounds      POCT Blood Glucose.: 165 mg/dL (12-02-18 @ 11:10)  POCT Blood Glucose.: 163 mg/dL (12-02-18 @ 08:04)  POCT Blood Glucose.: 213 mg/dL (12-02-18 @ 02:25)  POCT Blood Glucose.: 87 mg/dL (12-01-18 @ 21:48)  POCT Blood Glucose.: 167 mg/dL (12-01-18 @ 16:52)  POCT Blood Glucose.: 84 mg/dL (12-01-18 @ 16:27)  POCT Blood Glucose.: 66 mg/dL (12-01-18 @ 15:55)  POCT Blood Glucose.: 58 mg/dL (12-01-18 @ 15:37)  POCT Blood Glucose.: 416 mg/dL (12-01-18 @ 11:09)  POCT Blood Glucose.: 334 mg/dL (12-01-18 @ 08:14)  POCT Blood Glucose.: 113 mg/dL (11-30-18 @ 23:37)  POCT Blood Glucose.: 104 mg/dL (11-30-18 @ 18:04)  POCT Blood Glucose.: 135 mg/dL (11-30-18 @ 15:48)  POCT Blood Glucose.: 185 mg/dL (11-30-18 @ 14:44)  POCT Blood Glucose.: 338 mg/dL (11-30-18 @ 13:27)  POCT Blood Glucose.: 406 mg/dL (11-30-18 @ 12:04)  POCT Blood Glucose.: 357 mg/dL (11-30-18 @ 10:49)    12-02    135  |  92<L>  |  6<L>  ----------------------------<  192<H>  3.6   |  28  |  2.18<H>    EGFR if : 27<L>  EGFR if non : 24<L>    Ca    8.4      12-02    TPro  7.8  /  Alb  4.6  /  TBili  0.3  /  DBili  x   /  AST  56<H>  /  ALT  28  /  AlkPhos  92  11-30          Thyroid Function Tests:  11-17 @ 08:25 TSH 0.71 FreeT4 -- T3 -- Anti TPO -- Anti Thyroglobulin Ab -- TSI --      Hemoglobin A1C, Whole Blood: 8.6 % <H> [4.0 - 5.6] (11-16-18 @ 20:14)  Hemoglobin A1C, Whole Blood: 9.1 % <H> [4.0 - 5.6] (10-16-18 @ 08:13)

## 2018-12-02 NOTE — PROGRESS NOTE ADULT - ASSESSMENT
63 f with  DKA- IVF, Insulin, BS control, Endocrine follow.   Diet regular.  ESRD- HD, Nephrology evaluation Dr. Schmidt  CAD (coronary artery disease)  s/p stents- continue Rx. Cardiology evaluation Dr. Biswas.  CHF (congestive heart failure)  EF 40-45%- stable, continue Rx  CVA (cerebral infarction) - stable  Diabetes mellitus type 1  Insulin Dependent - Endocrine follow.  Gout- stable.  Depression- continue Rx  Hyperlipidemia- continue Rx    Peripheral vascular disease  occluded left fem-pop - stable  Pierce Viera MD pager [phone]
63 f with  DKA- resolved.   Diet regular.  ESRD- HD, Nephrology evaluation Dr. Schmidt  CAD (coronary artery disease)  s/p stents- continue Rx. Cardiology evaluation Dr. Biswas.  CHF (congestive heart failure)  EF 40-45%- stable, continue Rx  CVA (cerebral infarction) - stable  Diabetes mellitus type 1  Insulin Dependent - restart Insulin Pump  Endocrine follow noted. Cleared for DC  Gout- stable.  Depression- continue Rx  Hyperlipidemia- continue Rx    Peripheral vascular disease  occluded left fem-pop - stable  DC home with close follow with PMD/ Nephrology/ Endocrine/ Cardiology. QA  Pierce Viera MD pager [phone]
63F h/o DM1 (on insulin pump), ESRD (HD MTTSa), HTN, HLD, PAD, CAD, CHF adm 11/30 for malaise.    1- esrd  2- high agap acidosis --> DKA   3- shpt   4- htn   5- cad        imp/suggest: ESRD      Hemodialysis Prescription:  	Access: avf  	Dialyzer: revaclear 300  	Blood Flow (mL/Min): 400  	Dialysate Flow (mL/Min): 600  	Target UF (Liters): 1-1.5 liter   	Treatment Time: 3.5 hr   	Potassium: 2k   	Calcium: 2.5  	  YOLANDA epogen [id number] u     Vitamin D     continue with hd   see hd flow sheet
64yo F h/o DM1 (has insulin pump, non-adherent) w frequent hospitalizations for DKA, ESRD on HD (PEPEE AVF, M/T/Th/Sa_CAD s/p  stents, HTN HLD, CVA, PVD presents from home w chills, weakness, fatigue, likely viral illness.  Pt's BG levels are overall controlled, she is going to start pump now 11-30 am before her next lantus dose.
62yo F h/o DM1 (has insulin pump, non-adherent) w frequent hospitalizations for DKA, ESRD on HD (LUE AVF, M/T/Th/Sa, last HD yesterday morning), CAD s/p  stents, HTN HLD, CVA, PVD presents from home w chills, weakness, fatigue, likely viral illness.  Her BG are labile, above control in and hypoglycemia episodes in afternoon. Pt is very sensitive to small changes in insulin.

## 2018-12-17 ENCOUNTER — INPATIENT (INPATIENT)
Facility: HOSPITAL | Age: 63
LOS: 2 days | Discharge: HOME | DRG: 637 | End: 2018-12-20
Attending: INTERNAL MEDICINE | Admitting: INTERNAL MEDICINE
Payer: MEDICARE

## 2018-12-17 VITALS
WEIGHT: 117.95 LBS | OXYGEN SATURATION: 100 % | SYSTOLIC BLOOD PRESSURE: 150 MMHG | HEIGHT: 63 IN | HEART RATE: 78 BPM | DIASTOLIC BLOOD PRESSURE: 68 MMHG | TEMPERATURE: 98 F | RESPIRATION RATE: 22 BRPM

## 2018-12-17 DIAGNOSIS — E87.5 HYPERKALEMIA: ICD-10-CM

## 2018-12-17 DIAGNOSIS — Z98.89 OTHER SPECIFIED POSTPROCEDURAL STATES: Chronic | ICD-10-CM

## 2018-12-17 LAB
ALBUMIN SERPL ELPH-MCNC: 3.9 G/DL — SIGNIFICANT CHANGE UP (ref 3.3–5)
ALBUMIN SERPL ELPH-MCNC: 4.6 G/DL — SIGNIFICANT CHANGE UP (ref 3.3–5)
ALP SERPL-CCNC: 104 U/L — SIGNIFICANT CHANGE UP (ref 40–120)
ALP SERPL-CCNC: 87 U/L — SIGNIFICANT CHANGE UP (ref 40–120)
ALT FLD-CCNC: 35 U/L — SIGNIFICANT CHANGE UP (ref 10–45)
ALT FLD-CCNC: 47 U/L — HIGH (ref 10–45)
ANION GAP SERPL CALC-SCNC: 21 MMOL/L — HIGH (ref 5–17)
ANION GAP SERPL CALC-SCNC: 31 MMOL/L — HIGH (ref 5–17)
APTT BLD: 26 SEC — LOW (ref 27.5–36.3)
AST SERPL-CCNC: 108 U/L — HIGH (ref 10–40)
AST SERPL-CCNC: 72 U/L — HIGH (ref 10–40)
B-OH-BUTYR SERPL-SCNC: 0.2 MMOL/L — SIGNIFICANT CHANGE UP
B-OH-BUTYR SERPL-SCNC: 3.1 MMOL/L — HIGH
BASE EXCESS BLDV CALC-SCNC: -4.7 MMOL/L — LOW (ref -2–2)
BASOPHILS # BLD AUTO: 0 K/UL — SIGNIFICANT CHANGE UP (ref 0–0.2)
BASOPHILS # BLD AUTO: 0 K/UL — SIGNIFICANT CHANGE UP (ref 0–0.2)
BASOPHILS NFR BLD AUTO: 0.1 % — SIGNIFICANT CHANGE UP (ref 0–2)
BASOPHILS NFR BLD AUTO: 0.4 % — SIGNIFICANT CHANGE UP (ref 0–2)
BILIRUB SERPL-MCNC: 0.2 MG/DL — SIGNIFICANT CHANGE UP (ref 0.2–1.2)
BILIRUB SERPL-MCNC: 0.3 MG/DL — SIGNIFICANT CHANGE UP (ref 0.2–1.2)
BUN SERPL-MCNC: 49 MG/DL — HIGH (ref 7–23)
BUN SERPL-MCNC: 52 MG/DL — HIGH (ref 7–23)
CA-I SERPL-SCNC: 1 MMOL/L — LOW (ref 1.12–1.3)
CALCIUM SERPL-MCNC: 7.9 MG/DL — LOW (ref 8.4–10.5)
CALCIUM SERPL-MCNC: 8.4 MG/DL — SIGNIFICANT CHANGE UP (ref 8.4–10.5)
CHLORIDE BLDV-SCNC: 93 MMOL/L — LOW (ref 96–108)
CHLORIDE SERPL-SCNC: 84 MMOL/L — LOW (ref 96–108)
CHLORIDE SERPL-SCNC: 90 MMOL/L — LOW (ref 96–108)
CO2 BLDV-SCNC: 22 MMOL/L — SIGNIFICANT CHANGE UP (ref 22–30)
CO2 SERPL-SCNC: 17 MMOL/L — LOW (ref 22–31)
CO2 SERPL-SCNC: 23 MMOL/L — SIGNIFICANT CHANGE UP (ref 22–31)
CREAT SERPL-MCNC: 5.94 MG/DL — HIGH (ref 0.5–1.3)
CREAT SERPL-MCNC: 6.25 MG/DL — HIGH (ref 0.5–1.3)
EOSINOPHIL # BLD AUTO: 0.1 K/UL — SIGNIFICANT CHANGE UP (ref 0–0.5)
EOSINOPHIL # BLD AUTO: 0.1 K/UL — SIGNIFICANT CHANGE UP (ref 0–0.5)
EOSINOPHIL NFR BLD AUTO: 1.1 % — SIGNIFICANT CHANGE UP (ref 0–6)
EOSINOPHIL NFR BLD AUTO: 1.8 % — SIGNIFICANT CHANGE UP (ref 0–6)
GAS PNL BLDV: 128 MMOL/L — LOW (ref 136–145)
GAS PNL BLDV: SIGNIFICANT CHANGE UP
GAS PNL BLDV: SIGNIFICANT CHANGE UP
GLUCOSE BLDC GLUCOMTR-MCNC: 133 MG/DL — HIGH (ref 70–99)
GLUCOSE BLDC GLUCOMTR-MCNC: 134 MG/DL — HIGH (ref 70–99)
GLUCOSE BLDC GLUCOMTR-MCNC: 156 MG/DL — HIGH (ref 70–99)
GLUCOSE BLDC GLUCOMTR-MCNC: 157 MG/DL — HIGH (ref 70–99)
GLUCOSE BLDC GLUCOMTR-MCNC: 170 MG/DL — HIGH (ref 70–99)
GLUCOSE BLDC GLUCOMTR-MCNC: 299 MG/DL — HIGH (ref 70–99)
GLUCOSE BLDC GLUCOMTR-MCNC: 339 MG/DL — HIGH (ref 70–99)
GLUCOSE BLDC GLUCOMTR-MCNC: 525 MG/DL — CRITICAL HIGH (ref 70–99)
GLUCOSE BLDC GLUCOMTR-MCNC: 531 MG/DL — CRITICAL HIGH (ref 70–99)
GLUCOSE BLDC GLUCOMTR-MCNC: 565 MG/DL — CRITICAL HIGH (ref 70–99)
GLUCOSE BLDC GLUCOMTR-MCNC: 569 MG/DL — CRITICAL HIGH (ref 70–99)
GLUCOSE BLDC GLUCOMTR-MCNC: >600 MG/DL — CRITICAL HIGH (ref 70–99)
GLUCOSE BLDV-MCNC: 627 MG/DL — CRITICAL HIGH (ref 70–99)
GLUCOSE SERPL-MCNC: 498 MG/DL — CRITICAL HIGH (ref 70–99)
GLUCOSE SERPL-MCNC: 740 MG/DL — CRITICAL HIGH (ref 70–99)
HCO3 BLDV-SCNC: 21 MMOL/L — SIGNIFICANT CHANGE UP (ref 21–29)
HCT VFR BLD CALC: 28.4 % — LOW (ref 34.5–45)
HCT VFR BLD CALC: 33.5 % — LOW (ref 34.5–45)
HCT VFR BLDA CALC: 32 % — LOW (ref 39–50)
HGB BLD CALC-MCNC: 10.4 G/DL — LOW (ref 11.5–15.5)
HGB BLD-MCNC: 10.8 G/DL — LOW (ref 11.5–15.5)
HGB BLD-MCNC: 9.5 G/DL — LOW (ref 11.5–15.5)
HOROWITZ INDEX BLDV+IHG-RTO: SIGNIFICANT CHANGE UP
LACTATE BLDV-MCNC: 3.5 MMOL/L — HIGH (ref 0.7–2)
LACTATE BLDV-MCNC: 7.4 MMOL/L — CRITICAL HIGH (ref 0.7–2)
LYMPHOCYTES # BLD AUTO: 0.5 K/UL — LOW (ref 1–3.3)
LYMPHOCYTES # BLD AUTO: 0.7 K/UL — LOW (ref 1–3.3)
LYMPHOCYTES # BLD AUTO: 4.4 % — LOW (ref 13–44)
LYMPHOCYTES # BLD AUTO: 8.4 % — LOW (ref 13–44)
MAGNESIUM SERPL-MCNC: 2.5 MG/DL — SIGNIFICANT CHANGE UP (ref 1.6–2.6)
MCHC RBC-ENTMCNC: 32.4 GM/DL — SIGNIFICANT CHANGE UP (ref 32–36)
MCHC RBC-ENTMCNC: 33.4 GM/DL — SIGNIFICANT CHANGE UP (ref 32–36)
MCHC RBC-ENTMCNC: 33.4 PG — SIGNIFICANT CHANGE UP (ref 27–34)
MCHC RBC-ENTMCNC: 33.9 PG — SIGNIFICANT CHANGE UP (ref 27–34)
MCV RBC AUTO: 102 FL — HIGH (ref 80–100)
MCV RBC AUTO: 103 FL — HIGH (ref 80–100)
MONOCYTES # BLD AUTO: 0.6 K/UL — SIGNIFICANT CHANGE UP (ref 0–0.9)
MONOCYTES # BLD AUTO: 1.3 K/UL — HIGH (ref 0–0.9)
MONOCYTES NFR BLD AUTO: 11 % — SIGNIFICANT CHANGE UP (ref 2–14)
MONOCYTES NFR BLD AUTO: 7.9 % — SIGNIFICANT CHANGE UP (ref 2–14)
NEUTROPHILS # BLD AUTO: 10 K/UL — HIGH (ref 1.8–7.4)
NEUTROPHILS # BLD AUTO: 6.5 K/UL — SIGNIFICANT CHANGE UP (ref 1.8–7.4)
NEUTROPHILS NFR BLD AUTO: 81.5 % — HIGH (ref 43–77)
NEUTROPHILS NFR BLD AUTO: 83.4 % — HIGH (ref 43–77)
OSMOLALITY SERPL: 338 MOS/KG — HIGH (ref 275–300)
PCO2 BLDV: 45 MMHG — SIGNIFICANT CHANGE UP (ref 35–50)
PH BLDV: 7.29 — LOW (ref 7.35–7.45)
PHOSPHATE SERPL-MCNC: 4.6 MG/DL — HIGH (ref 2.5–4.5)
PLATELET # BLD AUTO: 255 K/UL — SIGNIFICANT CHANGE UP (ref 150–400)
PLATELET # BLD AUTO: 274 K/UL — SIGNIFICANT CHANGE UP (ref 150–400)
PO2 BLDV: 36 MMHG — SIGNIFICANT CHANGE UP (ref 25–45)
POTASSIUM BLDV-SCNC: 6 MMOL/L — HIGH (ref 3.5–5.3)
POTASSIUM SERPL-MCNC: 6 MMOL/L — HIGH (ref 3.5–5.3)
POTASSIUM SERPL-MCNC: 6.1 MMOL/L — HIGH (ref 3.5–5.3)
POTASSIUM SERPL-SCNC: 6 MMOL/L — HIGH (ref 3.5–5.3)
POTASSIUM SERPL-SCNC: 6.1 MMOL/L — HIGH (ref 3.5–5.3)
PROT SERPL-MCNC: 6.2 G/DL — SIGNIFICANT CHANGE UP (ref 6–8.3)
PROT SERPL-MCNC: 7.4 G/DL — SIGNIFICANT CHANGE UP (ref 6–8.3)
RAPID RVP RESULT: SIGNIFICANT CHANGE UP
RBC # BLD: 2.8 M/UL — LOW (ref 3.8–5.2)
RBC # BLD: 3.25 M/UL — LOW (ref 3.8–5.2)
RBC # FLD: 14.8 % — HIGH (ref 10.3–14.5)
RBC # FLD: 14.9 % — HIGH (ref 10.3–14.5)
SAO2 % BLDV: 54 % — LOW (ref 67–88)
SODIUM SERPL-SCNC: 132 MMOL/L — LOW (ref 135–145)
SODIUM SERPL-SCNC: 134 MMOL/L — LOW (ref 135–145)
WBC # BLD: 12 K/UL — HIGH (ref 3.8–10.5)
WBC # BLD: 7.9 K/UL — SIGNIFICANT CHANGE UP (ref 3.8–10.5)
WBC # FLD AUTO: 12 K/UL — HIGH (ref 3.8–10.5)
WBC # FLD AUTO: 7.9 K/UL — SIGNIFICANT CHANGE UP (ref 3.8–10.5)

## 2018-12-17 PROCEDURE — 99291 CRITICAL CARE FIRST HOUR: CPT

## 2018-12-17 PROCEDURE — 93010 ELECTROCARDIOGRAM REPORT: CPT

## 2018-12-17 PROCEDURE — 71045 X-RAY EXAM CHEST 1 VIEW: CPT | Mod: 26

## 2018-12-17 PROCEDURE — 99291 CRITICAL CARE FIRST HOUR: CPT | Mod: GC

## 2018-12-17 PROCEDURE — 74176 CT ABD & PELVIS W/O CONTRAST: CPT | Mod: 26

## 2018-12-17 RX ORDER — ASPIRIN/CALCIUM CARB/MAGNESIUM 324 MG
81 TABLET ORAL DAILY
Qty: 0 | Refills: 0 | Status: DISCONTINUED | OUTPATIENT
Start: 2018-12-17 | End: 2018-12-20

## 2018-12-17 RX ORDER — PIPERACILLIN AND TAZOBACTAM 4; .5 G/20ML; G/20ML
3.38 INJECTION, POWDER, LYOPHILIZED, FOR SOLUTION INTRAVENOUS EVERY 12 HOURS
Qty: 0 | Refills: 0 | Status: DISCONTINUED | OUTPATIENT
Start: 2018-12-17 | End: 2018-12-18

## 2018-12-17 RX ORDER — DEXTROSE 50 % IN WATER 50 %
15 SYRINGE (ML) INTRAVENOUS ONCE
Qty: 0 | Refills: 0 | Status: DISCONTINUED | OUTPATIENT
Start: 2018-12-17 | End: 2018-12-20

## 2018-12-17 RX ORDER — GLUCAGON INJECTION, SOLUTION 0.5 MG/.1ML
1 INJECTION, SOLUTION SUBCUTANEOUS ONCE
Qty: 0 | Refills: 0 | Status: DISCONTINUED | OUTPATIENT
Start: 2018-12-17 | End: 2018-12-20

## 2018-12-17 RX ORDER — ATORVASTATIN CALCIUM 80 MG/1
20 TABLET, FILM COATED ORAL AT BEDTIME
Qty: 0 | Refills: 0 | Status: DISCONTINUED | OUTPATIENT
Start: 2018-12-17 | End: 2018-12-20

## 2018-12-17 RX ORDER — DEXTROSE 10 % IN WATER 10 %
1000 INTRAVENOUS SOLUTION INTRAVENOUS
Qty: 0 | Refills: 0 | Status: DISCONTINUED | OUTPATIENT
Start: 2018-12-17 | End: 2018-12-18

## 2018-12-17 RX ORDER — SODIUM CHLORIDE 9 MG/ML
250 INJECTION INTRAMUSCULAR; INTRAVENOUS; SUBCUTANEOUS ONCE
Qty: 0 | Refills: 0 | Status: COMPLETED | OUTPATIENT
Start: 2018-12-17 | End: 2018-12-17

## 2018-12-17 RX ORDER — CLOPIDOGREL BISULFATE 75 MG/1
75 TABLET, FILM COATED ORAL DAILY
Qty: 0 | Refills: 0 | Status: DISCONTINUED | OUTPATIENT
Start: 2018-12-17 | End: 2018-12-20

## 2018-12-17 RX ORDER — SEVELAMER CARBONATE 2400 MG/1
800 POWDER, FOR SUSPENSION ORAL
Qty: 0 | Refills: 0 | Status: DISCONTINUED | OUTPATIENT
Start: 2018-12-17 | End: 2018-12-20

## 2018-12-17 RX ORDER — CINACALCET 30 MG/1
60 TABLET, FILM COATED ORAL DAILY
Qty: 0 | Refills: 0 | Status: DISCONTINUED | OUTPATIENT
Start: 2018-12-17 | End: 2018-12-20

## 2018-12-17 RX ORDER — SODIUM CHLORIDE 9 MG/ML
500 INJECTION, SOLUTION INTRAVENOUS EVERY 6 HOURS
Qty: 0 | Refills: 0 | Status: DISCONTINUED | OUTPATIENT
Start: 2018-12-17 | End: 2018-12-17

## 2018-12-17 RX ORDER — ONDANSETRON 8 MG/1
8 TABLET, FILM COATED ORAL EVERY 8 HOURS
Qty: 0 | Refills: 0 | Status: DISCONTINUED | OUTPATIENT
Start: 2018-12-17 | End: 2018-12-19

## 2018-12-17 RX ORDER — HYDRALAZINE HCL 50 MG
100 TABLET ORAL EVERY 8 HOURS
Qty: 0 | Refills: 0 | Status: DISCONTINUED | OUTPATIENT
Start: 2018-12-17 | End: 2018-12-20

## 2018-12-17 RX ORDER — ACETAMINOPHEN 500 MG
650 TABLET ORAL EVERY 6 HOURS
Qty: 0 | Refills: 0 | Status: DISCONTINUED | OUTPATIENT
Start: 2018-12-17 | End: 2018-12-20

## 2018-12-17 RX ORDER — METOPROLOL TARTRATE 50 MG
50 TABLET ORAL DAILY
Qty: 0 | Refills: 0 | Status: DISCONTINUED | OUTPATIENT
Start: 2018-12-17 | End: 2018-12-20

## 2018-12-17 RX ORDER — DULOXETINE HYDROCHLORIDE 30 MG/1
60 CAPSULE, DELAYED RELEASE ORAL DAILY
Qty: 0 | Refills: 0 | Status: DISCONTINUED | OUTPATIENT
Start: 2018-12-17 | End: 2018-12-20

## 2018-12-17 RX ORDER — METRONIDAZOLE 500 MG
500 TABLET ORAL EVERY 8 HOURS
Qty: 0 | Refills: 0 | Status: DISCONTINUED | OUTPATIENT
Start: 2018-12-17 | End: 2018-12-18

## 2018-12-17 RX ORDER — INSULIN HUMAN 100 [IU]/ML
1 INJECTION, SOLUTION SUBCUTANEOUS
Qty: 100 | Refills: 0 | Status: DISCONTINUED | OUTPATIENT
Start: 2018-12-17 | End: 2018-12-18

## 2018-12-17 RX ORDER — HEPARIN SODIUM 5000 [USP'U]/ML
5000 INJECTION INTRAVENOUS; SUBCUTANEOUS EVERY 8 HOURS
Qty: 0 | Refills: 0 | Status: DISCONTINUED | OUTPATIENT
Start: 2018-12-17 | End: 2018-12-19

## 2018-12-17 RX ORDER — DEXTROSE 50 % IN WATER 50 %
25 SYRINGE (ML) INTRAVENOUS
Qty: 0 | Refills: 0 | Status: DISCONTINUED | OUTPATIENT
Start: 2018-12-17 | End: 2018-12-20

## 2018-12-17 RX ORDER — DEXTROSE 50 % IN WATER 50 %
50 SYRINGE (ML) INTRAVENOUS
Qty: 0 | Refills: 0 | Status: DISCONTINUED | OUTPATIENT
Start: 2018-12-17 | End: 2018-12-20

## 2018-12-17 RX ORDER — SODIUM CHLORIDE 9 MG/ML
1000 INJECTION, SOLUTION INTRAVENOUS
Qty: 0 | Refills: 0 | Status: DISCONTINUED | OUTPATIENT
Start: 2018-12-17 | End: 2018-12-20

## 2018-12-17 RX ADMIN — Medication 100 MILLIGRAM(S): at 21:10

## 2018-12-17 RX ADMIN — SODIUM CHLORIDE 1000 MILLILITER(S): 9 INJECTION, SOLUTION INTRAVENOUS at 19:45

## 2018-12-17 RX ADMIN — ATORVASTATIN CALCIUM 20 MILLIGRAM(S): 80 TABLET, FILM COATED ORAL at 23:01

## 2018-12-17 RX ADMIN — INSULIN HUMAN 0.5 UNIT(S)/HR: 100 INJECTION, SOLUTION SUBCUTANEOUS at 23:21

## 2018-12-17 RX ADMIN — PIPERACILLIN AND TAZOBACTAM 25 GRAM(S): 4; .5 INJECTION, POWDER, LYOPHILIZED, FOR SOLUTION INTRAVENOUS at 23:01

## 2018-12-17 RX ADMIN — INSULIN HUMAN 5 UNIT(S)/HR: 100 INJECTION, SOLUTION SUBCUTANEOUS at 20:10

## 2018-12-17 RX ADMIN — Medication 40 MILLILITER(S): at 23:21

## 2018-12-17 RX ADMIN — DULOXETINE HYDROCHLORIDE 60 MILLIGRAM(S): 30 CAPSULE, DELAYED RELEASE ORAL at 23:09

## 2018-12-17 RX ADMIN — INSULIN HUMAN 5 UNIT(S)/HR: 100 INJECTION, SOLUTION SUBCUTANEOUS at 17:11

## 2018-12-17 RX ADMIN — SODIUM CHLORIDE 250 MILLILITER(S): 9 INJECTION INTRAMUSCULAR; INTRAVENOUS; SUBCUTANEOUS at 17:00

## 2018-12-17 NOTE — CONSULT NOTE ADULT - SUBJECTIVE AND OBJECTIVE BOX
[facility]  [phone]  NEPHROLOGY      INITIAL CONSULT NOTE  --------------------------------------------------------------------------------  HPI:      64 yo F pt w/PMHX CAD, CHF (EF 50% 10/2018), TIA, PVD, ESRD HD MTThSat, last HD Sat, presenting w/nausea, vomiting, diarrhea, AMS, found to be in DKA. As per pt and chart notes, pt had Chinese food with her family yesterday, and the entire family then began experiencing dull abdominal pain and diarrhea. Pt endorses dull lower abdominal pain, NBNB vomiting x1, and diarrhea x1. As per ED, pt was found minimally responsive, obtunded on couch, FS showing very high BG (no number given) so family brought her in.  In the ED, pt vitals were:  T=36.9 HR=78 OR=679/75 W0ARE=225% RA RR=22  Labs notable for leukocytosis (neutrophil predominant) to 12, hyponatremia 132, K+ 6, creatinine 5.94, FS >600, AG 31, transaminitis (108/47/104), beta-hydroxy butyrate 3.1, phos 4.6, VBG 7.29/45/36/21. There, pt was administered 250ML bolus NS and started on insulin gtt at 5. CXR and CT A/P also ordered. MICU consulted for DKA.   renal consult called for hyperkalemia and esrd    PAST HISTORY  --------------------------------------------------------------------------------  PAST MEDICAL & SURGICAL HISTORY:  CHF (congestive heart failure): EF 40-45%  Subclavian vein stenosis, left: s/p stent  DKA, type 1: 1/2015  ACS (acute coronary syndrome): 1/2015 - cath revealed 100% ostial stenosis not amenable to PCI - medical management  TIA (transient ischemic attack): x 2 - 8-9 years ago prior to ASD/VSD repair  CAD (coronary artery disease): s/p stents  Gout: past  CVA (cerebral infarction): with no residual, 8 yrs ago, prior to heart surgery - ST memory loss  Peripheral vascular disease: occluded left fem-pop bypass 5/2015  Diabetes mellitus type 1: Insulin Dependent - [employer]  Minimed Paradigm Insulin Pump - Novolog  ESRD (end stage renal disease): dialysis  M, tue, thursday, saturday  Hyperlipidemia  Status post device closure of ASD: &quot;clamshell&quot;  History of cardiac catheterization: 1/2015 - no intervention  S/P femoral-popliteal bypass surgery: L and R in 2013 with graft; 5/2015 CFA angioplasty left and ileofemoral endarterectomywith vein patch angioplasty of left fem-pop bypass graft  Multiple vascular surgery both leg, left fempop bypass revision 11/2015  AV (arteriovenous fistula): Left AV graft; revision with stent placement 2-3 years ago  S/P cholecystectomy    FAMILY HISTORY:  Family history of smoking  Family history of hypertension  Family history of cancer (Sibling)    PAST SOCIAL HISTORY:   no alcohol   ALLERGIES & MEDICATIONS  --------------------------------------------------------------------------------  Allergies    No Known Allergies    Intolerances      Standing Inpatient Medications  aspirin enteric coated 81 milliGRAM(s) Oral daily  atorvastatin 20 milliGRAM(s) Oral at bedtime  cinacalcet 60 milliGRAM(s) Oral daily  clopidogrel Tablet 75 milliGRAM(s) Oral daily  dextrose 5%. 1000 milliLiter(s) IV Continuous <Continuous>  dextrose 50% Injectable 50 milliLiter(s) IV Push every 15 minutes  dextrose 50% Injectable 25 milliLiter(s) IV Push every 15 minutes  DULoxetine 60 milliGRAM(s) Oral daily  heparin  Injectable 5000 Unit(s) SubCutaneous every 8 hours  hydrALAZINE 100 milliGRAM(s) Oral every 8 hours  insulin Infusion 5 Unit(s)/Hr IV Continuous <Continuous>  lactated ringers Bolus 500 milliLiter(s) IV Bolus every 6 hours  metoprolol succinate ER 50 milliGRAM(s) Oral daily  metroNIDAZOLE  IVPB 500 milliGRAM(s) IV Intermittent every 8 hours  multivitamin 1 Tablet(s) Oral daily  piperacillin/tazobactam IVPB. 3.375 Gram(s) IV Intermittent every 12 hours  sevelamer hydrochloride 800 milliGRAM(s) Oral three times a day with meals    PRN Inpatient Medications  acetaminophen   Tablet .. 650 milliGRAM(s) Oral every 6 hours PRN  dextrose 40% Gel 15 Gram(s) Oral once PRN  glucagon  Injectable 1 milliGRAM(s) IntraMuscular once PRN      REVIEW OF SYSTEMS  --------------------------------------------------------------------------------  Gen: No  fevers/chills   Head/Eyes/Ears/Mouth: No headache; Normal hearing;  No sinus pain/discomfort, sore throat  Respiratory: No dyspnea, cough  CV: No chest pain, orthopnea  GI: No abdominal pain, diarrhea, + nausea, vomiting +   : No dysuria, no hematuria.  + anuria   MSK: No joint pain/swelling; no back pain  Neuro: No dizziness/lightheadedness,   also with no edema     All other systems were reviewed and are negative, except as noted.    VITALS/PHYSICAL EXAM  --------------------------------------------------------------------------------  T(C): 36.9 (12-17-18 @ 14:56), Max: 36.9 (12-17-18 @ 14:26)  HR: 78 (12-17-18 @ 14:56) (78 - 78)  BP: 151/75 (12-17-18 @ 14:56) (150/68 - 151/75)  RR: 22 (12-17-18 @ 14:56) (22 - 22)  SpO2: 100% (12-17-18 @ 14:56) (100% - 100%)  Wt(kg): --  Height (cm): 160.02 (12-17-18 @ 14:26)  Weight (kg): 53.5 (12-17-18 @ 14:26)  BMI (kg/m2): 20.9 (12-17-18 @ 14:26)  BSA (m2): 1.55 (12-17-18 @ 14:26)      Physical Exam:  	Gen: overall ill appearing forgetful today   	no jvd   	Pulm: decrease bs  no rales or ronchi or wheezing  	CV: RRR, S1S2; no rub  	Back: No CVA tenderness; no sacral edema  	Abd: +BS, soft, nontender/nondistended  	: No suprapubic tenderness  	UE: Warm, no cyanosis  no clubbing,  no edema no myoclonus   	LE: Warm, no cyanosis  no clubbing, LLE 2+ edema  	Neuro: alert and oriented. speech coherent   	Skin: Warm, no decrease skin turgor   	Vascular access: + AVF + bruit and thrill     LABS/STUDIES  --------------------------------------------------------------------------------              10.8   12.0  >-----------<  274      [12-17-18 @ 15:15]              33.5     132  |  84  |  49  ----------------------------<  740      [12-17-18 @ 15:14]  6.0   |  17  |  5.94        Ca     8.4     [12-17-18 @ 15:14]      Mg     2.5     [12-17-18 @ 15:14]      Phos  4.6     [12-17-18 @ 15:14]    TPro  7.4  /  Alb  4.6  /  TBili  0.3  /  DBili  x   /  AST  108  /  ALT  47  /  AlkPhos  104  [12-17-18 @ 15:14]        Serum Osmolality 338      [12-17-18 @ 15:15]    Creatinine Trend:  SCr 5.94 [12-17 @ 15:14]  SCr 2.18 [12-02 @ 06:07]  SCr 3.43 [12-01 @ 11:35]  SCr 3.08 [12-01 @ 06:40]  SCr 4.91 [11-30 @ 13:32]    Urinalysis - [06-04-17 @ 08:24]      Color Yellow / Appearance Clear / SG 1.013 / pH 8.5      Gluc 1000 / Ketone Negative  / Bili Negative / Urobili Negative       Blood Negative / Protein >600 / Leuk Est Small / Nitrite Negative      RBC 3-5 / WBC 6-10 / Hyaline  / Gran  / Sq Epi  / Non Sq Epi OCC / Bacteria       PTH -- (Ca 8.3)      [03-03-18 @ 08:53]   134  HbA1c 8.6      [11-16-18 @ 20:14]  TSH 0.71      [11-17-18 @ 08:25]  Lipid: chol 113, TG 86, HDL 59, LDL 37      [04-30-18 @ 06:44]    HBsAb <3.0      [11-17-18 @ 00:31]  HBsAb Nonreact      [05-02-15 @ 15:35]  HBsAg Nonreact      [11-17-18 @ 00:31]  HBcAb Nonreact      [11-17-18 @ 00:31]  HCV 0.13, Nonreact      [11-17-18 @ 00:31]

## 2018-12-17 NOTE — H&P ADULT - NSHPLABSRESULTS_GEN_ALL_CORE
LABS personally reviewed:	 	                        10.8   12.0  )-----------( 274      ( 17 Dec 2018 15:15 )             33.5     12-17    132<L>  |  84<L>  |  49<H>  ----------------------------<  740<HH>  6.0<H>   |  17<L>  |  5.94<H>    Ca    8.4      17 Dec 2018 15:14  Phos  4.6     12-17  Mg     2.5     12-17    TPro  7.4  /  Alb  4.6  /  TBili  0.3  /  DBili  x   /  AST  108<H>  /  ALT  47<H>  /  AlkPhos  104  12-17    proBNP:   Lipid Profile:   HgA1c:   TSH:   FS: CAPILLARY BLOOD GLUCOSE      POCT Blood Glucose.: >600 mg/dL (17 Dec 2018 14:45)  POCT Blood Glucose.: >600 mg/dL (17 Dec 2018 14:44)    BCX/UCX:       CARDIAC MARKERS:                	    ECG personally reviewed:    RADIOLOGY: LABS personally reviewed:	 	                        10.8   12.0  )-----------( 274      ( 17 Dec 2018 15:15 )             33.5     12-17    132<L>  |  84<L>  |  49<H>  ----------------------------<  740<HH>  6.0<H>   |  17<L>  |  5.94<H>    Ca    8.4      17 Dec 2018 15:14  Phos  4.6     12-17  Mg     2.5     12-17    TPro  7.4  /  Alb  4.6  /  TBili  0.3  /  DBili  x   /  AST  108<H>  /  ALT  47<H>  /  AlkPhos  104  12-17    POCT Blood Glucose.: >600 mg/dL (17 Dec 2018 14:45)  POCT Blood Glucose.: >600 mg/dL (17 Dec 2018 14:44)    BCX/UCX: pending    Beta Hydroxy-Butyrate (12.17.18 @ 15:14)    Beta Hydroxy-Butyrate: 3.1 mmol/L  	    ECG personally reviewed: SR 77    RADIOLOGY: pending CXR, CT A/P

## 2018-12-17 NOTE — H&P ADULT - NSHPPHYSICALEXAM_GEN_ALL_CORE
ICU Vital Signs Last 24 Hrs  T(C): 36.9 (17 Dec 2018 14:56), Max: 36.9 (17 Dec 2018 14:26)  T(F): 98.4 (17 Dec 2018 14:56), Max: 98.4 (17 Dec 2018 14:26)  HR: 78 (17 Dec 2018 14:56) (78 - 78)  BP: 151/75 (17 Dec 2018 14:56) (150/68 - 151/75)  BP(mean): --  ABP: --  ABP(mean): --  RR: 22 (17 Dec 2018 14:56) (22 - 22)  SpO2: 100% (17 Dec 2018 14:56) (100% - 100%)    Gen: NAD  HEENT: normocephalic, EOMI, PERRLA, neck supple, no thyromegaly  Chest/CV: RRR, +2 peripheral pulses  Pulm: CTAB/L  Abd/GI: soft, NT, ND +BS  MSK: no peripheral edema/cyanosis  Heme/lymph: no cervical lymphadenopathy  Skin: no rashes  Neuro: CN 2-12 grossly intact  Psych: alert and oriented, normal mood, affect ICU Vital Signs Last 24 Hrs  T(C): 36.9 (17 Dec 2018 14:56), Max: 36.9 (17 Dec 2018 14:26)  T(F): 98.4 (17 Dec 2018 14:56), Max: 98.4 (17 Dec 2018 14:26)  HR: 78 (17 Dec 2018 14:56) (78 - 78)  BP: 151/75 (17 Dec 2018 14:56) (150/68 - 151/75)  BP(mean): --  ABP: --  ABP(mean): --  RR: 22 (17 Dec 2018 14:56) (22 - 22)  SpO2: 100% (17 Dec 2018 14:56) (100% - 100%)    Gen: thin female pt, somnolent but oriented  HEENT: normocephalic, EOMI, PERRLA, neck supple, no thyromegaly  Chest/CV: RRR, +2 peripheral pulses  Pulm: CTAB/L  Abd/GI: soft, ND, abdominal tenderness in RUQ mildly  MSK: mild edema LLE  Heme/lymph: no cervical lymphadenopathy  Skin: +AVF LUE bruits  Neuro: CN 2-12 grossly intact  Psych: oriented but somnolent

## 2018-12-17 NOTE — H&P ADULT - NSHPREVIEWOFSYSTEMS_GEN_ALL_CORE
Constitutional: denies fevers, chills  HEENT: denies changes in vision, rhinorrhea, tussis  CV: denies palpitations, CP  Pulm: denies SOB  GI: +dull abdominal pain, cramping, +nausea, +diarrhea  : anuric  Skin: denies new lesions, peripheral swelling  Back/MSK: denies new muscle/joint aches  Neuro/psych: denies neurologic deficits, seizure-like activity, dizziness  Heme/lymph: denies new bruises/bleeds, lymphadenopathy

## 2018-12-17 NOTE — ED PROVIDER NOTE - ATTENDING CONTRIBUTION TO CARE
Attending MD Jana Rincon:  I personally have seen and examined this patient.  Resident note reviewed and agree on plan of care and except where noted.  See HPI, PE, and MDM for details.

## 2018-12-17 NOTE — H&P ADULT - ASSESSMENT
#Neuro:  #CV:  #Pulm:  #GI:  #/Renal:  #Endo/Rheum:  #Heme/onc:  #ID:  #DVT ppx  #Dispo:    Roxie Snyder, PGY2  [facility], CHF (EF 50% 10/2018), HD MTThSat, last HD Sat, presenting w/nausea, vomiting, diarrhea, AMS, found to be in DKA.    #Neuro: somnolent, likely metabolic encephalopathy from DKA  -will correct DKA, reassess    #CV:   a) CAD    b) CHF    b) HTN    #Pulm: stable currently  -continue to monitor    #GI: nausea/vomiting likely 2/2 enteric infection  a) supportive care w/cautious fluids, zofran    #/Renal:  a) ESRD - last HD Saturday  -due for dialysis - nephrology consulted  -c/w sensipar, renvela    #Endo/Rheum:  a) DKA  -c/w duloxetine for diabetic neuropathy      #Heme/onc: H&H stable, no current issues  -monitor CBC    #ID: leukocytosis, nausea/vomiting, may be enteric infection precipitating DKA  -will send stool cultures, C. diff if no resolution of diarrhea  -BCX, RVP  -CT A/P no contrast  -will start on zosyn/flagyl for anaerobic coverage    #DVT ppx  -SQH    #Dispo:  -JULES Snyder, PGY2  [facility] 62 yo F pt w/PMHX CAD, CHF (EF 50% 10/2018), HD MTThSat, last HD Sat, presenting w/nausea, vomiting, diarrhea, AMS, found to be in DKA.    #Neuro: somnolent, likely metabolic encephalopathy from DKA  -will correct DKA, reassess  -pain control PRN     #CV:   a) CAD: stable  -c/w aspirin, plavix, atorvastatin for now    b) CHF  -c/w metoprolol 50 for now, hydralazine 25 q8hrs, SBP in 150s    b) HTN:  SBP in 150s  -c/w metoprolol 50 for now, hydralazine 25 q8hrs    #Pulm: stable currently  -continue to monitor    #GI: nausea/vomiting likely 2/2 enteric infection  a) supportive care w/cautious fluids, zofran    #/Renal: hyponatremia likely 2/2 hyperglycemia, hyperkalemia  a) ESRD - last HD Saturday  -due for dialysis - nephrology consulted  -c/w sensipar, renvela for calcium, phosphate respectively    #Endo/Rheum:  a) DKA: AG to 31, metabolic acidosis, +beta-hydroxy-butyrate  -c/w insulin gtt, FSBG, cautious fluid administration given ESRD  -c/w duloxetine for diabetic neuropathy    #Heme/onc: H&H stable, no current issues  -monitor CBC    #ID: leukocytosis, nausea/vomiting, may be enteric infection precipitating DKA  -will send stool cultures, C. diff if no resolution of diarrhea  -BCX, RVP  -CT A/P no contrast  -will start on zosyn/flagyl for anaerobic coverage    #DVT ppx  -SQH    #Dispo:  -[facility]U    Roxie Snyder, PGY2  [facility] 64 yo F pt w/PMHX CAD, CHF (EF 50% 10/2018), HD MTThSat, last HD Sat, presenting w/nausea, vomiting, diarrhea, AMS, found to be in DKA.    #Neuro: somnolent, likely metabolic encephalopathy from DKA  -will correct DKA, reassess  -pain control PRN     #CV:   a) CAD: stable  -c/w aspirin, plavix, atorvastatin for now    b) CHF  -c/w metoprolol 50 for now, hydralazine 25 q8hrs, SBP in 150s    b) HTN:  SBP in 150s  -c/w metoprolol 50 for now, hydralazine 25 q8hrs    #Pulm: stable currently  -continue to monitor    #GI: nausea/vomiting likely 2/2 enteric infection  a) supportive care w/cautious fluids, zofran    #/Renal: hyponatremia likely 2/2 hyperglycemia, hyperkalemia  a) ESRD - last HD Saturday  -due for dialysis - nephrology consulted  -will monitor K+ after dialysis - cannot give Lasix as pt anuric, already having diarrhea, so no Kayexalate - no EKG changes  -c/w sensipar, renvela for calcium, phosphate respectively    #Endo/Rheum:  a) DKA: AG to 31, metabolic acidosis, +beta-hydroxy-butyrate  -c/w insulin gtt, FSBG, cautious fluid administration given ESRD 500ml boluses  -q4 CMP to monitor anion gap  -q12 VBGs  -c/w duloxetine for diabetic neuropathy    #Heme/onc: H&H stable, no current issues  -monitor CBC    #ID: leukocytosis, nausea/vomiting, may be enteric infection precipitating DKA  -will send stool cultures, C. diff if no resolution of diarrhea  -BCX, RVP  -CT A/P no contrast  -f/u CXR  -will start on zosyn/flagyl for anaerobic coverage    #DVT ppx  -SQH    #Dispo:  -JULES Snyder, PGY2  [facility] 64 yo F pt w/PMHX CAD, CHF (EF 50% 10/2018), HD MTThSat, last HD Sat, presenting w/nausea, vomiting, diarrhea, AMS, found to be in DKA.    #Neuro: somnolent, likely metabolic encephalopathy from DKA  -will correct DKA, reassess  -pain control PRN     #CV:   a) CAD: stable  -c/w aspirin, plavix, atorvastatin for now    b) CHF  -c/w metoprolol 50 for now, hydralazine 25 q8hrs, SBP in 150s    b) HTN:  SBP in 150s  -c/w metoprolol 50 for now, hydralazine 25 q8hrs    #Pulm: stable currently  -continue to monitor    #GI: nausea/vomiting likely 2/2 enteric infection  a) supportive care w/cautious fluids, zofran    #/Renal: hyponatremia likely 2/2 hyperglycemia, hyperkalemia  a) ESRD - last HD Saturday  -due for dialysis - nephrology consulted  -will monitor K+ after dialysis - cannot give Lasix as pt anuric, already having diarrhea, so no Kayexalate - no EKG changes  -c/w sensipar, renvela for calcium, phosphate respectively    #Endo/Rheum:  a) DKA: AG to 31, metabolic acidosis, +beta-hydroxy-butyrate  -c/w insulin gtt, FSBG, cautious fluid administration given ESRD 500ml boluses  -q4 CMP to monitor anion gap  -q12 VBGs  -c/w duloxetine for diabetic neuropathy    #Heme/onc: H&H stable, no current issues  -monitor CBC    #ID: leukocytosis, nausea/vomiting, lactate 7.4, may be enteric infection precipitating DKA  -will send stool cultures, C. diff if no resolution of diarrhea  -BCX, RVP  -CT A/P no contrast  -f/u CXR  -will start on zosyn/flagyl for anaerobic coverage    #DVT ppx  -SQH    #Dispo:  -JULES Snyder, PGY2  JULES 62 yo F pt w/PMHX CAD, CHF (EF 50% 10/2018), HD MTThSat, TIA, PVD, ESRD last HD Sat, presenting w/nausea, vomiting, diarrhea, AMS, found to be in DKA.    #Neuro: somnolent, likely metabolic encephalopathy from DKA  -will correct DKA, reassess  -pain control PRN     #CV:   a) CAD: stable  -c/w aspirin, plavix, atorvastatin for now    b) CHF  -c/w metoprolol 50 qd for now, hydralazine 100 q8hrs, SBP in 150s    b) HTN:  SBP in 150s  -c/w metoprolol 50 for now, hydralazine 25 q8hrs    #Pulm: stable currently  -continue to monitor    #GI: nausea/vomiting likely 2/2 enteric infection  a) supportive care w/cautious fluids, zofran    #/Renal: hyponatremia likely 2/2 hyperglycemia, hyperkalemia  a) ESRD - last HD Saturday  -due for dialysis - nephrology consulted  -will monitor K+ after dialysis - cannot give Lasix as pt anuric, already having diarrhea, so no Kayexalate - no EKG changes  -c/w sensipar, renvela for calcium, phosphate respectively    #Endo/Rheum:  a) DKA: AG to 31, metabolic acidosis, +beta-hydroxy-butyrate  -c/w insulin gtt, FSBG, cautious fluid administration given ESRD 500ml boluses  -q4 CMP to monitor anion gap  -q12 VBGs  -c/w duloxetine for diabetic neuropathy    #Heme/onc: H&H stable, no current issues  -monitor CBC    #ID: leukocytosis, nausea/vomiting, lactate 7.4, may be enteric infection precipitating DKA  -will send stool cultures, C. diff if no resolution of diarrhea  -BCX, RVP  -CT A/P no contrast  -f/u CXR  -will start on zosyn/flagyl for anaerobic coverage    #DVT ppx  -IMPROVE score SQH    #Dispo:  -JULES Snyder, PGY2  JULES 64 yo F pt w/PMHX CAD, CHF (EF 50% 10/2018), HD MTThSat, TIA, PVD, ESRD last HD Sat, presenting w/nausea, vomiting, diarrhea, AMS, found to be in DKA.    #Neuro: somnolent, likely metabolic encephalopathy from DKA  -will correct DKA, reassess  -pain control PRN     #CV:   a) CAD: stable  -c/w aspirin, plavix, atorvastatin for now    b) CHF  -c/w metoprolol 50 qd for now, hydralazine 100 q8hrs, SBP in 150s    b) HTN:  SBP in 150s  -c/w metoprolol 50 for now, hydralazine 25 q8hrs    #Pulm: stable currently  -continue to monitor    #GI: nausea/vomiting likely 2/2 enteric infection  a) supportive care w/cautious fluids, zofran    #/Renal: hyponatremia likely 2/2 hyperglycemia, hyperkalemia  a) ESRD - last HD Saturday  -due for dialysis - nephrology consulted  -will monitor K+ after dialysis - cannot give Lasix as pt anuric, already having diarrhea, so no Kayexalate - no EKG changes  -c/w sensipar, renvela for calcium, phosphate respectively    #Endo/Rheum:  a) DKA: AG to 31, metabolic acidosis, +beta-hydroxy-butyrate  -c/w insulin gtt, FSBG, cautious fluid administration given ESRD 500ml boluses  -q4 CMP to monitor anion gap  -q12 VBGs  -c/w duloxetine for diabetic neuropathy    #Heme/onc: H&H stable, no current issues  -monitor CBC    #ID: leukocytosis, nausea/vomiting, lactate 7.4, may be enteric infection precipitating DKA  -will send stool cultures, C. diff if no resolution of diarrhea  -BCX, RVP  -CT A/P no contrast  -f/u CXR  -will start on zosyn/flagyl for anaerobic coverage    #DVT ppx  -IMPROVE score 1.7%, SQH    #Dispo:  -JULES Snyder, PGY2  [facility] 62 yo F pt w/PMHX CAD, CHF (EF 50% 10/2018), HD MTThSat, TIA, PVD, ESRD last HD Sat, presenting w/nausea, vomiting, diarrhea, AMS, found to be in DKA.    #Neuro: somnolent, likely metabolic encephalopathy from DKA  -will correct DKA, reassess  -pain control PRN     #CV:   a) CAD: stable  -c/w aspirin, plavix, atorvastatin for now    b) CHF  -c/w metoprolol 50 qd for now, hydralazine 100 q8hrs, SBP in 150s    b) HTN:  SBP in 150s  -c/w metoprolol 50 for now, hydralazine 25 q8hrs    #Pulm: stable currently  -continue to monitor    #GI: nausea/vomiting likely 2/2 enteric infection  a) supportive care w/cautious fluids, zofran    b) transaminitis - unclear etiology, s/p cholecystectomy  -f/u CT A/P    #/Renal: hyponatremia likely 2/2 hyperglycemia, hyperkalemia  a) ESRD - last HD Saturday  -due for dialysis - nephrology consulted  -will monitor K+ after dialysis - cannot give Lasix as pt anuric, already having diarrhea, so no Kayexalate - no EKG changes  -c/w sensipar, renvela for calcium, phosphate respectively    #Endo/Rheum:  a) DKA: AG to 31, metabolic acidosis, +beta-hydroxy-butyrate  -c/w insulin gtt, FSBG, cautious fluid administration given ESRD 500ml boluses  -q4 CMP to monitor anion gap  -q12 VBGs  -c/w duloxetine for diabetic neuropathy    #Heme/onc: H&H stable, no current issues  -monitor CBC    #ID: leukocytosis, nausea/vomiting, lactate 7.4, may be enteric infection precipitating DKA  -will send stool cultures, C. diff if no resolution of diarrhea  -BCX, RVP  -CT A/P no contrast  -f/u CXR  -will start on zosyn/flagyl for anaerobic coverage    #DVT ppx  -IMPROVE score 1.7%, SQH    #Dispo:  -JULES Snyder, PGY2  [facility]

## 2018-12-17 NOTE — H&P ADULT - HISTORY OF PRESENT ILLNESS
64 yo F pt w/PMHX CAD, CHF (EF 50% 10/2018), HD MTThSat, last HD Sat, presenting w/nausea, vomiting, diarrhea, AMS, found to be in DKA. As per pt and chart notes, pt had Chinese food with her family yesterday, and the entire family then began experiencing dull abdominal pain and diarrhea. Pt endorses dull lower abdominal pain, NBNB vomiting x1, and diarrhea x1. Denies F/C, CP/SOB. Pt is anuric. Of note pt frequently hospitalized for DKA - last hospitalization earlier this month. Has medtronic insulin pump basal rate 1.8u/hr. 64 yo F pt w/PMHX CAD, CHF (EF 50% 10/2018), HD MTThSat, last HD Sat, presenting w/nausea, vomiting, diarrhea, AMS, found to be in DKA. As per pt and chart notes, pt had Chinese food with her family yesterday, and the entire family then began experiencing dull abdominal pain and diarrhea. Pt endorses dull lower abdominal pain, NBNB vomiting x1, and diarrhea x1. Denies F/C, CP/SOB. Pt is anuric. Of note pt frequently hospitalized for DKA - last hospitalization earlier this month. Has medtronic insulin pump basal rate 1.8u/hr.    In the ED, pt vitals were:  T=36.9 HR=78 EH=684/75 K1PQC=514% RA RR=22  Labs notable for leukocytosis (neutrophil predominant) to 12, hyponatremia 132, K+ 6, creatinine 5.94, FS >600, AG 31, transaminitis (108/47/104), beta-hydroxy butyrate 3.1, phos 4.6, VBG 7.29/45/36/21. There, pt was administered 250ML bolus NS and started on insulin gtt at 5. CXR and CT A/P also ordered. 62 yo F pt w/PMHX CAD, CHF (EF 50% 10/2018), HD MTThSat, last HD Sat, presenting w/nausea, vomiting, diarrhea, AMS, found to be in DKA. As per pt and chart notes, pt had Chinese food with her family yesterday, and the entire family then began experiencing dull abdominal pain and diarrhea. Pt endorses dull lower abdominal pain, NBNB vomiting x1, and diarrhea x1. Denies F/C, CP/SOB. Pt is anuric. Of note pt frequently hospitalized for DKA - last hospitalization earlier this month. Has medtronic insulin pump basal rate 1.8u/hr.    In the ED, pt vitals were:  T=36.9 HR=78 VX=839/75 X7HMF=897% RA RR=22  Labs notable for leukocytosis (neutrophil predominant) to 12, hyponatremia 132, K+ 6, creatinine 5.94, FS >600, AG 31, transaminitis (108/47/104), beta-hydroxy butyrate 3.1, phos 4.6, VBG 7.29/45/36/21. There, pt was administered 250ML bolus NS and started on insulin gtt at 5. CXR and CT A/P also ordered. MICU consulted for DKA. 64 yo F pt w/PMHX CAD, CHF (EF 50% 10/2018), HD MTThSat, last HD Sat, presenting w/nausea, vomiting, diarrhea, AMS, found to be in DKA. As per pt and chart notes, pt had Chinese food with her family yesterday, and the entire family then began experiencing dull abdominal pain and diarrhea. Pt endorses dull lower abdominal pain, NBNB vomiting x1, and diarrhea x1. As per ED, pt was found minimally responsive, obtunded on couch, FS showing very high BG (no number given) so family brought her in. Denies F/C, CP/SOB. Pt is anuric. Of note pt frequently hospitalized for DKA - last hospitalization earlier this month. Has medtronic insulin pump basal rate 1.8u/hr.    In the ED, pt vitals were:  T=36.9 HR=78 JM=377/75 K5EHH=992% RA RR=22  Labs notable for leukocytosis (neutrophil predominant) to 12, hyponatremia 132, K+ 6, creatinine 5.94, FS >600, AG 31, transaminitis (108/47/104), beta-hydroxy butyrate 3.1, phos 4.6, VBG 7.29/45/36/21. There, pt was administered 250ML bolus NS and started on insulin gtt at 5. CXR and CT A/P also ordered. MICU consulted for DKA. 64 yo F pt w/PMHX CAD, CHF (EF 50% 10/2018), TIA, PVD, ESRD HD MTThSat, last HD Sat, presenting w/nausea, vomiting, diarrhea, AMS, found to be in DKA. As per pt and chart notes, pt had Chinese food with her family yesterday, and the entire family then began experiencing dull abdominal pain and diarrhea. Pt endorses dull lower abdominal pain, NBNB vomiting x1, and diarrhea x1. As per ED, pt was found minimally responsive, obtunded on couch, FS showing very high BG (no number given) so family brought her in. Denies F/C, CP/SOB. Pt is anuric. Of note pt frequently hospitalized for DKA - last hospitalization earlier this month. Has medtronic insulin pump basal rate 1.8u/hr.    In the ED, pt vitals were:  T=36.9 HR=78 FY=096/75 V8USH=724% RA RR=22  Labs notable for leukocytosis (neutrophil predominant) to 12, hyponatremia 132, K+ 6, creatinine 5.94, FS >600, AG 31, transaminitis (108/47/104), beta-hydroxy butyrate 3.1, phos 4.6, VBG 7.29/45/36/21. There, pt was administered 250ML bolus NS and started on insulin gtt at 5. CXR and CT A/P also ordered. MICU consulted for DKA.

## 2018-12-17 NOTE — ED ADULT NURSE NOTE - OBJECTIVE STATEMENT
64 y/o female to ED via ems from home, with h/o ESRD on Dialysis, done this past   Saturday. Pt is poorly controlled diabetes presenting to ED lethargic and glucose elevated. Per EMS report pt found on her couch. Vomiting x few times today. Took insulin this AM. Pt responds to verbal amd tactile stimuli. Resp even and nonlab. Per EMS family ate chinese food last night and few people became sick.

## 2018-12-17 NOTE — ED PROVIDER NOTE - PROGRESS NOTE DETAILS
Attending Jana Rincon: MICU and nephrology consulted. pt found to be hyperglycemic, and hyperkalemic Attending Jana Rincon: d/w micu will admit

## 2018-12-17 NOTE — ED ADULT NURSE NOTE - NSIMPLEMENTINTERV_GEN_ALL_ED
Implemented All Universal Safety Interventions:  [place] to call system. Call bell, personal items and telephone within reach. Instruct patient to call for assistance. Room bathroom lighting operational. Non-slip footwear when patient is off stretcher. Physically safe environment: no spills, clutter or unnecessary equipment. Stretcher in lowest position, wheels locked, appropriate side rails in place.

## 2018-12-17 NOTE — ED PROVIDER NOTE - PHYSICAL EXAMINATION
Attending Jana Rincon: Gen: ill appearing, heent: atrauamtic, eomi, perrla, mmm, op pink, uvula midline, neck; nttp, no nuchal rigidity, chest: nttp, no crepitus, cv: rrr, no murmurs, lungs: ctab, abd: soft, mild ttp lower abdomen, no peritoneal signs, +BS, no guarding, ext: wwp, fistula on left, skin: no rash, neuro: awake and alert, sleepy

## 2018-12-17 NOTE — ED ADULT NURSE NOTE - NS ED NURSE LEVEL OF CONSCIOUSNESS AFFECT
A pleasant 53 year old male presents today for routine physical examination.  His new concerns include: Patient presents for an adult wellness exam. He has a history of seasonal allergies. Patient states that nothing has helped for his sinus congestion and drainage in the past. No change to patients past medical or family history. Social history patient denies tobacco or illegal drug use. He drinks alcohol occasionally. He is employed as a .      Psoriasis                                                     Allergic rhinitis                                             Spinal headache                                                 Comment: from hernia surgery    Urinary tract infection                         2008            Comment: recurrent x2      REPAIR RECURR INGUIN FLIP,REDUCIBL              05/26/2009    FLEXIBLE SIGMOIDOSCOPY DIAGNOSTIC INCLUDE SPEC* 08/18/2005      Comment: Sigmoidoscopy    HERNIA REPAIR                                   2007            Comment: bilat inguinal hernia    NASAL SURG PROC UNLISTED                        2000            Comment: unknown what was done, but was packed post op    COLONOSCOPY                                     10/27/2015    Current Outpatient Prescriptions   Medication Sig Dispense Refill   • DiphenhydrAMINE HCl (BENADRYL ALLERGY PO) Take 2 tablets by mouth daily as needed.      • lisinopril (ZESTRIL) 10 MG tablet Take 1 tablet by mouth daily. 90 tablet 1   • tadalafil (CIALIS) 10 MG tablet Take 1 tablet by mouth as needed for Erectile Dysfunction. 10 tablet 5   • fluticasone (FLONASE) 50 MCG/ACT nasal spray Spray 1 spray in each nostril daily. 48 g 3   • levocetirizine (XYZAL) 5 MG tablet Take 1 tablet by mouth every evening. 30 tablet 11     No current facility-administered medications for this visit.        ALLERGIES:   Allergen Reactions   • Ciprofloxacin GI UPSET, NAUSEA and PRURITUS     Abdominal pain       Social History     Social History    • Marital status:      Spouse name: N/A   • Number of children: 2   • Years of education: N/A     Occupational History   •  [employer]     Social History Main Topics   • Smoking status: Never Smoker   • Smokeless tobacco: Never Used   • Alcohol use 2.5 oz/week     5 Standard drinks or equivalent per week   • Drug use: No   • Sexual activity: Yes     Partners: Female     Other Topics Concern   • Caffeine Concern No   • Occupational Exposure No   • Sleep Concern No   • Stress Concern No   • Weight Concern No   • Special Diet No   • Back Care No   • Exercise Yes   • Bike Helmet Yes   • Seat Belt Yes   • Self-Exams No     Social History Narrative   • No narrative on file       Family History   Problem Relation Age of Onset   • Heart disease Mother    • Diabetes Father    • Cancer Father 82     prostate       REVIEW OF SYSTEMS:  14 point review of systems negative except as noted in the history of present illness.    PHYSICAL EXAMINATION:  GENERAL: Pleasant, cooperative, in no distress.  Visit Vitals  /84 (BP Location: [facility] [place] Position: Sitting, Cuff Size: Regular)   Pulse 62   Temp 97.3 °F (36.3 °C) (Tympanic)   Resp 18   Ht 5' 11\" (1.803 m) Comment: pt reported   Wt 77.3 kg   SpO2 99% Comment: RA   BMI 23.77 kg/m²     HEENT: Head normocephalic, atraumatic. Eyes: Extraocular movements  intact. Pupils are equally responsive and reactive to light. Conjunctivae  pink. Sclerae anicteric. Ears: External ears are normal. Tympanic  membranes and external auditory canals are normal. Nasopharyngeal mucosa  is normal. Oropharyngeal mucosa is normal.  NECK: Supple without mass. No thyromegaly or adenopathy. No carotid  bruits.  LUNGS: Lung fields are clear to auscultation and percussion.  CHEST WALL: No mass.  HEART: Regular rate and rhythm. Normal S1 and S2. No murmur, rub,  gallop, or click. No JVD(jugular venous distension) or hepatojugular reflux.  ABDOMEN: Soft and nontender. No mass. No  hepatosplenomegaly.  GENITOURINARY: Testicles are descended bilaterally. No nodules. No  hernia. Phallus is normal.  DIGITAL RECTAL EXAM: No rectal mass. Prostate is nonenlarged smooth and symmetric. Normal sphincter tone.  LOWER EXTREMITIES: No edema. Dorsalis pedis and posterior tibial pulses  are normal.  SKIN SCREEN: No dysplastic nevi or rash.  LYMPHATIC: No axillary, supraclavicular or inguinal adenopathy.  VASCULAR: No flank or femoral bruits.      ASSESSMENT AND PLAN:  Physical exam completed.   New issues covered include: Well adult male. CMP lipid panel ordered. Xyzal called to the pharmacy. Take as directed. Follow-up as needed.   Calm

## 2018-12-17 NOTE — ED PROVIDER NOTE - MEDICAL DECISION MAKING DETAILS
Attending Jana Rincon: 62 y/o female ESRD on HD, DM, presenting with weakness, hyperglycemia. mild ttp lower abdomen. family reportedly with GI issues after bad food which could have precipitated event. concern for DKA with AMS, hyperglycemia. will obtain labs, osmol, VBG, and d/w MICU and likely nephro

## 2018-12-17 NOTE — ED PROVIDER NOTE - OBJECTIVE STATEMENT
Attending Jana Rincon: 62 y/o female h/o ESRD on hD last saturday, poorly controlled diabetes presenting with AMS, and elevated glucose. per report pt found on her couch. vomiting today, early this am. per report multiple family members ate chinese food. unclear whether diarrhea. family checked fingersticks at home and reportedly elevated. pt more sleepy then her baseline. no reports of trauma

## 2018-12-17 NOTE — H&P ADULT - NSHPSOCIALHISTORY_GEN_ALL_CORE
Occupation:  Tobacco use:  Alcohol use:  Recreational drug use:  High-risk sexual activity: no hx smoking, ETOH, drug use. Lives with .

## 2018-12-17 NOTE — H&P ADULT - ATTENDING COMMENTS
Pt seen and examined. 63 F with a PMH of DM type 1 ( has an insulin pump but is non-adherent) , recurrent hospitalizations and MICU admissions for DKA, HTN, HLD, CAD s/p PCI, PVD, CVA, ESRD on HD, now brought in with DKA in the setting of nausea/ vomiting and diarrhea after reportedly eating chinese food. B, A and B-hydroxybutrate 3.1. IV hydration with serial NS boluses, will start with 500 cc now. Cont insulin gtt, follow FS Q1H with BMP, mag/ phos Q4H. Check lactate, BCxs, stool cxs and RVP. Start empiric antibiotic coverage with Zosyn and Flagyl. Will check CT abd/ pelvis w/o cont. Currently stable hemodynamics.   - Critical Care Time Spent: 35 mins

## 2018-12-18 DIAGNOSIS — I10 ESSENTIAL (PRIMARY) HYPERTENSION: ICD-10-CM

## 2018-12-18 DIAGNOSIS — E10.10 TYPE 1 DIABETES MELLITUS WITH KETOACIDOSIS WITHOUT COMA: ICD-10-CM

## 2018-12-18 DIAGNOSIS — E78.5 HYPERLIPIDEMIA, UNSPECIFIED: ICD-10-CM

## 2018-12-18 DIAGNOSIS — E11.22 TYPE 2 DIABETES MELLITUS WITH DIABETIC CHRONIC KIDNEY DISEASE: ICD-10-CM

## 2018-12-18 LAB
ANION GAP SERPL CALC-SCNC: 14 MMOL/L — SIGNIFICANT CHANGE UP (ref 5–17)
ANION GAP SERPL CALC-SCNC: 14 MMOL/L — SIGNIFICANT CHANGE UP (ref 5–17)
ANION GAP SERPL CALC-SCNC: 18 MMOL/L — HIGH (ref 5–17)
ANION GAP SERPL CALC-SCNC: 19 MMOL/L — HIGH (ref 5–17)
APTT BLD: 25.9 SEC — LOW (ref 27.5–36.3)
B-OH-BUTYR SERPL-SCNC: 0.2 MMOL/L — SIGNIFICANT CHANGE UP
B-OH-BUTYR SERPL-SCNC: 0.5 MMOL/L — HIGH
B-OH-BUTYR SERPL-SCNC: 1.3 MMOL/L — HIGH
BASE EXCESS BLDV CALC-SCNC: 5.8 MMOL/L — HIGH (ref -2–2)
BASE EXCESS BLDV CALC-SCNC: 6.4 MMOL/L — HIGH (ref -2–2)
BASE EXCESS BLDV CALC-SCNC: 6.7 MMOL/L — HIGH (ref -2–2)
BASOPHILS # BLD AUTO: 0 K/UL — SIGNIFICANT CHANGE UP (ref 0–0.2)
BASOPHILS NFR BLD AUTO: 0.5 % — SIGNIFICANT CHANGE UP (ref 0–2)
BLD GP AB SCN SERPL QL: NEGATIVE — SIGNIFICANT CHANGE UP
BUN SERPL-MCNC: 12 MG/DL — SIGNIFICANT CHANGE UP (ref 7–23)
BUN SERPL-MCNC: 13 MG/DL — SIGNIFICANT CHANGE UP (ref 7–23)
BUN SERPL-MCNC: 13 MG/DL — SIGNIFICANT CHANGE UP (ref 7–23)
BUN SERPL-MCNC: 14 MG/DL — SIGNIFICANT CHANGE UP (ref 7–23)
CA-I SERPL-SCNC: 1 MMOL/L — LOW (ref 1.12–1.3)
CA-I SERPL-SCNC: 1.04 MMOL/L — LOW (ref 1.12–1.3)
CA-I SERPL-SCNC: 1.04 MMOL/L — LOW (ref 1.12–1.3)
CALCIUM SERPL-MCNC: 7.9 MG/DL — LOW (ref 8.4–10.5)
CALCIUM SERPL-MCNC: 7.9 MG/DL — LOW (ref 8.4–10.5)
CALCIUM SERPL-MCNC: 8 MG/DL — LOW (ref 8.4–10.5)
CALCIUM SERPL-MCNC: 8.1 MG/DL — LOW (ref 8.4–10.5)
CHLORIDE BLDV-SCNC: 90 MMOL/L — LOW (ref 96–108)
CHLORIDE BLDV-SCNC: 94 MMOL/L — LOW (ref 96–108)
CHLORIDE BLDV-SCNC: 95 MMOL/L — LOW (ref 96–108)
CHLORIDE SERPL-SCNC: 88 MMOL/L — LOW (ref 96–108)
CHLORIDE SERPL-SCNC: 92 MMOL/L — LOW (ref 96–108)
CHLORIDE SERPL-SCNC: 92 MMOL/L — LOW (ref 96–108)
CHLORIDE SERPL-SCNC: 93 MMOL/L — LOW (ref 96–108)
CO2 BLDV-SCNC: 31 MMOL/L — HIGH (ref 22–30)
CO2 BLDV-SCNC: 32 MMOL/L — HIGH (ref 22–30)
CO2 BLDV-SCNC: 32 MMOL/L — HIGH (ref 22–30)
CO2 SERPL-SCNC: 24 MMOL/L — SIGNIFICANT CHANGE UP (ref 22–31)
CO2 SERPL-SCNC: 25 MMOL/L — SIGNIFICANT CHANGE UP (ref 22–31)
CO2 SERPL-SCNC: 27 MMOL/L — SIGNIFICANT CHANGE UP (ref 22–31)
CO2 SERPL-SCNC: 27 MMOL/L — SIGNIFICANT CHANGE UP (ref 22–31)
CREAT SERPL-MCNC: 2.3 MG/DL — HIGH (ref 0.5–1.3)
CREAT SERPL-MCNC: 2.63 MG/DL — HIGH (ref 0.5–1.3)
CREAT SERPL-MCNC: 2.88 MG/DL — HIGH (ref 0.5–1.3)
CREAT SERPL-MCNC: 3.14 MG/DL — HIGH (ref 0.5–1.3)
EOSINOPHIL # BLD AUTO: 0.1 K/UL — SIGNIFICANT CHANGE UP (ref 0–0.5)
EOSINOPHIL NFR BLD AUTO: 0.8 % — SIGNIFICANT CHANGE UP (ref 0–6)
GAS PNL BLDV: 124 MMOL/L — LOW (ref 136–145)
GAS PNL BLDV: 132 MMOL/L — LOW (ref 136–145)
GAS PNL BLDV: 132 MMOL/L — LOW (ref 136–145)
GAS PNL BLDV: SIGNIFICANT CHANGE UP
GLUCOSE BLDC GLUCOMTR-MCNC: 107 MG/DL — HIGH (ref 70–99)
GLUCOSE BLDC GLUCOMTR-MCNC: 144 MG/DL — HIGH (ref 70–99)
GLUCOSE BLDC GLUCOMTR-MCNC: 144 MG/DL — HIGH (ref 70–99)
GLUCOSE BLDC GLUCOMTR-MCNC: 149 MG/DL — HIGH (ref 70–99)
GLUCOSE BLDC GLUCOMTR-MCNC: 151 MG/DL — HIGH (ref 70–99)
GLUCOSE BLDC GLUCOMTR-MCNC: 153 MG/DL — HIGH (ref 70–99)
GLUCOSE BLDC GLUCOMTR-MCNC: 156 MG/DL — HIGH (ref 70–99)
GLUCOSE BLDC GLUCOMTR-MCNC: 157 MG/DL — HIGH (ref 70–99)
GLUCOSE BLDC GLUCOMTR-MCNC: 162 MG/DL — HIGH (ref 70–99)
GLUCOSE BLDC GLUCOMTR-MCNC: 168 MG/DL — HIGH (ref 70–99)
GLUCOSE BLDC GLUCOMTR-MCNC: 196 MG/DL — HIGH (ref 70–99)
GLUCOSE BLDC GLUCOMTR-MCNC: 201 MG/DL — HIGH (ref 70–99)
GLUCOSE BLDC GLUCOMTR-MCNC: 454 MG/DL — CRITICAL HIGH (ref 70–99)
GLUCOSE BLDC GLUCOMTR-MCNC: 53 MG/DL — LOW (ref 70–99)
GLUCOSE BLDC GLUCOMTR-MCNC: 57 MG/DL — LOW (ref 70–99)
GLUCOSE BLDC GLUCOMTR-MCNC: 94 MG/DL — SIGNIFICANT CHANGE UP (ref 70–99)
GLUCOSE BLDV-MCNC: 157 MG/DL — HIGH (ref 70–99)
GLUCOSE BLDV-MCNC: 187 MG/DL — HIGH (ref 70–99)
GLUCOSE BLDV-MCNC: 445 MG/DL — HIGH (ref 70–99)
GLUCOSE SERPL-MCNC: 162 MG/DL — HIGH (ref 70–99)
GLUCOSE SERPL-MCNC: 163 MG/DL — HIGH (ref 70–99)
GLUCOSE SERPL-MCNC: 197 MG/DL — HIGH (ref 70–99)
GLUCOSE SERPL-MCNC: 477 MG/DL — CRITICAL HIGH (ref 70–99)
HAV IGM SER-ACNC: SIGNIFICANT CHANGE UP
HBV CORE AB SER-ACNC: SIGNIFICANT CHANGE UP
HBV CORE IGM SER-ACNC: SIGNIFICANT CHANGE UP
HBV SURFACE AB SER-ACNC: <3 MIU/ML — LOW
HBV SURFACE AG SER-ACNC: SIGNIFICANT CHANGE UP
HCO3 BLDV-SCNC: 30 MMOL/L — HIGH (ref 21–29)
HCO3 BLDV-SCNC: 31 MMOL/L — HIGH (ref 21–29)
HCO3 BLDV-SCNC: 31 MMOL/L — HIGH (ref 21–29)
HCT VFR BLD CALC: 29.9 % — LOW (ref 34.5–45)
HCT VFR BLDA CALC: 29 % — LOW (ref 39–50)
HCT VFR BLDA CALC: 30 % — LOW (ref 39–50)
HCT VFR BLDA CALC: 30 % — LOW (ref 39–50)
HCV AB S/CO SERPL IA: 0.1 S/CO — SIGNIFICANT CHANGE UP
HCV AB SERPL-IMP: SIGNIFICANT CHANGE UP
HGB BLD CALC-MCNC: 9.5 G/DL — LOW (ref 11.5–15.5)
HGB BLD CALC-MCNC: 9.6 G/DL — LOW (ref 11.5–15.5)
HGB BLD CALC-MCNC: 9.7 G/DL — LOW (ref 11.5–15.5)
HGB BLD-MCNC: 9.9 G/DL — LOW (ref 11.5–15.5)
HOROWITZ INDEX BLDV+IHG-RTO: 21 — SIGNIFICANT CHANGE UP
INR BLD: 1.03 RATIO — SIGNIFICANT CHANGE UP (ref 0.88–1.16)
LACTATE BLDV-MCNC: 1.4 MMOL/L — SIGNIFICANT CHANGE UP (ref 0.7–2)
LACTATE BLDV-MCNC: 1.5 MMOL/L — SIGNIFICANT CHANGE UP (ref 0.7–2)
LACTATE BLDV-MCNC: 1.6 MMOL/L — SIGNIFICANT CHANGE UP (ref 0.7–2)
LYMPHOCYTES # BLD AUTO: 1 K/UL — SIGNIFICANT CHANGE UP (ref 1–3.3)
LYMPHOCYTES # BLD AUTO: 13.7 % — SIGNIFICANT CHANGE UP (ref 13–44)
MAGNESIUM SERPL-MCNC: 1.8 MG/DL — SIGNIFICANT CHANGE UP (ref 1.6–2.6)
MAGNESIUM SERPL-MCNC: 1.9 MG/DL — SIGNIFICANT CHANGE UP (ref 1.6–2.6)
MCHC RBC-ENTMCNC: 33.1 GM/DL — SIGNIFICANT CHANGE UP (ref 32–36)
MCHC RBC-ENTMCNC: 33.8 PG — SIGNIFICANT CHANGE UP (ref 27–34)
MCV RBC AUTO: 102 FL — HIGH (ref 80–100)
MONOCYTES # BLD AUTO: 0.6 K/UL — SIGNIFICANT CHANGE UP (ref 0–0.9)
MONOCYTES NFR BLD AUTO: 9 % — SIGNIFICANT CHANGE UP (ref 2–14)
NEUTROPHILS # BLD AUTO: 5.5 K/UL — SIGNIFICANT CHANGE UP (ref 1.8–7.4)
NEUTROPHILS NFR BLD AUTO: 76 % — SIGNIFICANT CHANGE UP (ref 43–77)
OTHER CELLS CSF MANUAL: 11 ML/DL — LOW (ref 18–22)
OTHER CELLS CSF MANUAL: 12 ML/DL — LOW (ref 18–22)
OTHER CELLS CSF MANUAL: 8 ML/DL — LOW (ref 18–22)
PCO2 BLDV: 43 MMHG — SIGNIFICANT CHANGE UP (ref 35–50)
PCO2 BLDV: 44 MMHG — SIGNIFICANT CHANGE UP (ref 35–50)
PCO2 BLDV: 47 MMHG — SIGNIFICANT CHANGE UP (ref 35–50)
PH BLDV: 7.44 — SIGNIFICANT CHANGE UP (ref 7.35–7.45)
PH BLDV: 7.45 — SIGNIFICANT CHANGE UP (ref 7.35–7.45)
PH BLDV: 7.46 — HIGH (ref 7.35–7.45)
PHOSPHATE SERPL-MCNC: 3.2 MG/DL — SIGNIFICANT CHANGE UP (ref 2.5–4.5)
PHOSPHATE SERPL-MCNC: 3.4 MG/DL — SIGNIFICANT CHANGE UP (ref 2.5–4.5)
PHOSPHATE SERPL-MCNC: 4.1 MG/DL — SIGNIFICANT CHANGE UP (ref 2.5–4.5)
PHOSPHATE SERPL-MCNC: 4.2 MG/DL — SIGNIFICANT CHANGE UP (ref 2.5–4.5)
PLATELET # BLD AUTO: 239 K/UL — SIGNIFICANT CHANGE UP (ref 150–400)
PO2 BLDV: 35 MMHG — SIGNIFICANT CHANGE UP (ref 25–45)
PO2 BLDV: 45 MMHG — SIGNIFICANT CHANGE UP (ref 25–45)
PO2 BLDV: 51 MMHG — HIGH (ref 25–45)
POTASSIUM BLDV-SCNC: 3.8 MMOL/L — SIGNIFICANT CHANGE UP (ref 3.5–5.3)
POTASSIUM BLDV-SCNC: 4 MMOL/L — SIGNIFICANT CHANGE UP (ref 3.5–5.3)
POTASSIUM BLDV-SCNC: 4.4 MMOL/L — SIGNIFICANT CHANGE UP (ref 3.5–5.3)
POTASSIUM SERPL-MCNC: 4.1 MMOL/L — SIGNIFICANT CHANGE UP (ref 3.5–5.3)
POTASSIUM SERPL-MCNC: 4.2 MMOL/L — SIGNIFICANT CHANGE UP (ref 3.5–5.3)
POTASSIUM SERPL-MCNC: 4.6 MMOL/L — SIGNIFICANT CHANGE UP (ref 3.5–5.3)
POTASSIUM SERPL-MCNC: 4.7 MMOL/L — SIGNIFICANT CHANGE UP (ref 3.5–5.3)
POTASSIUM SERPL-SCNC: 4.1 MMOL/L — SIGNIFICANT CHANGE UP (ref 3.5–5.3)
POTASSIUM SERPL-SCNC: 4.2 MMOL/L — SIGNIFICANT CHANGE UP (ref 3.5–5.3)
POTASSIUM SERPL-SCNC: 4.6 MMOL/L — SIGNIFICANT CHANGE UP (ref 3.5–5.3)
POTASSIUM SERPL-SCNC: 4.7 MMOL/L — SIGNIFICANT CHANGE UP (ref 3.5–5.3)
PROTHROM AB SERPL-ACNC: 11.8 SEC — SIGNIFICANT CHANGE UP (ref 10–12.9)
RBC # BLD: 2.93 M/UL — LOW (ref 3.8–5.2)
RBC # FLD: 14.4 % — SIGNIFICANT CHANGE UP (ref 10.3–14.5)
RH IG SCN BLD-IMP: POSITIVE — SIGNIFICANT CHANGE UP
SAO2 % BLDV: 60 % — LOW (ref 67–88)
SAO2 % BLDV: 81 % — SIGNIFICANT CHANGE UP (ref 67–88)
SAO2 % BLDV: 88 % — SIGNIFICANT CHANGE UP (ref 67–88)
SODIUM SERPL-SCNC: 129 MMOL/L — LOW (ref 135–145)
SODIUM SERPL-SCNC: 133 MMOL/L — LOW (ref 135–145)
SODIUM SERPL-SCNC: 135 MMOL/L — SIGNIFICANT CHANGE UP (ref 135–145)
SODIUM SERPL-SCNC: 136 MMOL/L — SIGNIFICANT CHANGE UP (ref 135–145)
WBC # BLD: 7.2 K/UL — SIGNIFICANT CHANGE UP (ref 3.8–10.5)
WBC # FLD AUTO: 7.2 K/UL — SIGNIFICANT CHANGE UP (ref 3.8–10.5)

## 2018-12-18 PROCEDURE — 99223 1ST HOSP IP/OBS HIGH 75: CPT | Mod: GC

## 2018-12-18 PROCEDURE — 99291 CRITICAL CARE FIRST HOUR: CPT

## 2018-12-18 PROCEDURE — 76700 US EXAM ABDOM COMPLETE: CPT | Mod: 26

## 2018-12-18 PROCEDURE — 12345: CPT | Mod: NC

## 2018-12-18 RX ORDER — INSULIN GLARGINE 100 [IU]/ML
9 INJECTION, SOLUTION SUBCUTANEOUS ONCE
Qty: 0 | Refills: 0 | Status: DISCONTINUED | OUTPATIENT
Start: 2018-12-18 | End: 2018-12-18

## 2018-12-18 RX ORDER — HUMAN INSULIN 100 [IU]/ML
4 INJECTION, SUSPENSION SUBCUTANEOUS ONCE
Qty: 0 | Refills: 0 | Status: COMPLETED | OUTPATIENT
Start: 2018-12-18 | End: 2018-12-18

## 2018-12-18 RX ORDER — INSULIN LISPRO 100/ML
4 VIAL (ML) SUBCUTANEOUS
Qty: 0 | Refills: 0 | Status: DISCONTINUED | OUTPATIENT
Start: 2018-12-18 | End: 2018-12-20

## 2018-12-18 RX ORDER — INSULIN LISPRO 100/ML
VIAL (ML) SUBCUTANEOUS
Qty: 0 | Refills: 0 | Status: DISCONTINUED | OUTPATIENT
Start: 2018-12-18 | End: 2018-12-20

## 2018-12-18 RX ORDER — INSULIN GLARGINE 100 [IU]/ML
7 INJECTION, SOLUTION SUBCUTANEOUS
Qty: 0 | Refills: 0 | Status: DISCONTINUED | OUTPATIENT
Start: 2018-12-18 | End: 2018-12-19

## 2018-12-18 RX ORDER — INSULIN LISPRO 100/ML
VIAL (ML) SUBCUTANEOUS AT BEDTIME
Qty: 0 | Refills: 0 | Status: DISCONTINUED | OUTPATIENT
Start: 2018-12-18 | End: 2018-12-20

## 2018-12-18 RX ORDER — INSULIN GLARGINE 100 [IU]/ML
9 INJECTION, SOLUTION SUBCUTANEOUS AT BEDTIME
Qty: 0 | Refills: 0 | Status: DISCONTINUED | OUTPATIENT
Start: 2018-12-18 | End: 2018-12-18

## 2018-12-18 RX ORDER — INSULIN GLARGINE 100 [IU]/ML
9 INJECTION, SOLUTION SUBCUTANEOUS
Qty: 0 | Refills: 0 | Status: DISCONTINUED | OUTPATIENT
Start: 2018-12-18 | End: 2018-12-18

## 2018-12-18 RX ORDER — HYDRALAZINE HCL 50 MG
50 TABLET ORAL ONCE
Qty: 0 | Refills: 0 | Status: COMPLETED | OUTPATIENT
Start: 2018-12-18 | End: 2018-12-18

## 2018-12-18 RX ORDER — PIPERACILLIN AND TAZOBACTAM 4; .5 G/20ML; G/20ML
3.38 INJECTION, POWDER, LYOPHILIZED, FOR SOLUTION INTRAVENOUS EVERY 12 HOURS
Qty: 0 | Refills: 0 | Status: DISCONTINUED | OUTPATIENT
Start: 2018-12-18 | End: 2018-12-18

## 2018-12-18 RX ADMIN — Medication 100 MILLIGRAM(S): at 05:50

## 2018-12-18 RX ADMIN — SEVELAMER CARBONATE 800 MILLIGRAM(S): 2400 POWDER, FOR SUSPENSION ORAL at 12:32

## 2018-12-18 RX ADMIN — CLOPIDOGREL BISULFATE 75 MILLIGRAM(S): 75 TABLET, FILM COATED ORAL at 12:32

## 2018-12-18 RX ADMIN — HUMAN INSULIN 4 UNIT(S): 100 INJECTION, SUSPENSION SUBCUTANEOUS at 12:26

## 2018-12-18 RX ADMIN — INSULIN GLARGINE 7 UNIT(S): 100 INJECTION, SOLUTION SUBCUTANEOUS at 23:29

## 2018-12-18 RX ADMIN — ATORVASTATIN CALCIUM 20 MILLIGRAM(S): 80 TABLET, FILM COATED ORAL at 22:43

## 2018-12-18 RX ADMIN — DULOXETINE HYDROCHLORIDE 60 MILLIGRAM(S): 30 CAPSULE, DELAYED RELEASE ORAL at 22:43

## 2018-12-18 RX ADMIN — Medication 50 MILLIGRAM(S): at 02:15

## 2018-12-18 RX ADMIN — Medication 50 MILLILITER(S): at 05:50

## 2018-12-18 RX ADMIN — Medication 100 MILLIGRAM(S): at 09:12

## 2018-12-18 RX ADMIN — Medication 81 MILLIGRAM(S): at 12:32

## 2018-12-18 RX ADMIN — Medication 4 UNIT(S): at 12:51

## 2018-12-18 RX ADMIN — Medication 1 TABLET(S): at 12:32

## 2018-12-18 RX ADMIN — Medication 50 MILLIGRAM(S): at 05:50

## 2018-12-18 RX ADMIN — Medication 100 MILLIGRAM(S): at 22:43

## 2018-12-18 RX ADMIN — CINACALCET 60 MILLIGRAM(S): 30 TABLET, FILM COATED ORAL at 12:33

## 2018-12-18 RX ADMIN — Medication 50 MILLIGRAM(S): at 00:39

## 2018-12-18 RX ADMIN — ONDANSETRON 8 MILLIGRAM(S): 8 TABLET, FILM COATED ORAL at 06:28

## 2018-12-18 RX ADMIN — Medication 100 MILLIGRAM(S): at 16:02

## 2018-12-18 NOTE — PROGRESS NOTE ADULT - SUBJECTIVE AND OBJECTIVE BOX
Patient is a 63y old  Female who presents with a chief complaint of DKA (18 Dec 2018 08:13)      SUBJECTIVE / OVERNIGHT EVENTS: more alert  Review of Systems  chest pain no  palpitations no  sob no  nausea no  headache no    MEDICATIONS  (STANDING):  aspirin enteric coated 81 milliGRAM(s) Oral daily  atorvastatin 20 milliGRAM(s) Oral at bedtime  cinacalcet 60 milliGRAM(s) Oral daily  clopidogrel Tablet 75 milliGRAM(s) Oral daily  dextrose 10%. 1000 milliLiter(s) (50 mL/Hr) IV Continuous <Continuous>  dextrose 5%. 1000 milliLiter(s) (50 mL/Hr) IV Continuous <Continuous>  dextrose 50% Injectable 50 milliLiter(s) IV Push every 15 minutes  dextrose 50% Injectable 25 milliLiter(s) IV Push every 15 minutes  DULoxetine 60 milliGRAM(s) Oral daily  heparin  Injectable 5000 Unit(s) SubCutaneous every 8 hours  hydrALAZINE 100 milliGRAM(s) Oral every 8 hours  insulin glargine Injectable (LANTUS) 9 Unit(s) SubCutaneous <User Schedule>  insulin Infusion 1 Unit(s)/Hr (1 mL/Hr) IV Continuous <Continuous>  insulin lispro (HumaLOG) corrective regimen sliding scale   SubCutaneous three times a day before meals  insulin lispro (HumaLOG) corrective regimen sliding scale   SubCutaneous at bedtime  insulin lispro Injectable (HumaLOG) 4 Unit(s) SubCutaneous three times a day before meals  metoprolol succinate ER 50 milliGRAM(s) Oral daily  multivitamin 1 Tablet(s) Oral daily  sevelamer hydrochloride 800 milliGRAM(s) Oral three times a day with meals    MEDICATIONS  (PRN):  acetaminophen   Tablet .. 650 milliGRAM(s) Oral every 6 hours PRN Mild Pain (1 - 3), Moderate Pain (4 - 6)  dextrose 40% Gel 15 Gram(s) Oral once PRN Blood Glucose LESS THAN 70 milliGRAM(s)/deciliter  glucagon  Injectable 1 milliGRAM(s) IntraMuscular once PRN Glucose LESS THAN 70 milligrams/deciliter  ondansetron Injectable 8 milliGRAM(s) IV Push every 8 hours PRN Nausea and/or Vomiting      Vital Signs Last 24 Hrs  T(C): 36.8 (18 Dec 2018 08:00), Max: 37.7 (18 Dec 2018 04:00)  T(F): 98.2 (18 Dec 2018 08:00), Max: 99.9 (18 Dec 2018 04:00)  HR: 80 (18 Dec 2018 11:00) (75 - 89)  BP: 136/65 (18 Dec 2018 11:00) (134/65 - 196/84)  BP(mean): 94 (18 Dec 2018 11:00) (91 - 124)  RR: 14 (18 Dec 2018 11:00) (13 - 23)  SpO2: 94% (18 Dec 2018 11:00) (94% - 100%)    PHYSICAL EXAM:  GENERAL: NAD, well-developed  HEAD:  Atraumatic, Normocephalic  EYES: EOMI, PERRLA, conjunctiva and sclera clear  NECK: Supple, No JVD  CHEST/LUNG: Clear to auscultation bilaterally; No wheeze  HEART: Regular rate and rhythm; No murmurs, rubs, or gallops  ABDOMEN: Soft, Nontender, Nondistended; Bowel sounds present  EXTREMITIES:  2+ Peripheral Pulses, No clubbing, cyanosis, or edema  PSYCH: AAOx3  NEUROLOGY: non-focal  SKIN: No rashes or lesions    LABS:                        9.9    7.2   )-----------( 239      ( 18 Dec 2018 01:14 )             29.9     12-18    129<L>  |  88<L>  |  13  ----------------------------<  477<HH>  4.7   |  27  |  2.88<H>    Ca    7.9<L>      18 Dec 2018 10:11  Phos  4.1     12-18  Mg     1.8     12-18    TPro  6.2  /  Alb  3.9  /  TBili  0.2  /  DBili  x   /  AST  72<H>  /  ALT  35  /  AlkPhos  87  12-17    PT/INR - ( 18 Dec 2018 01:14 )   PT: 11.8 sec;   INR: 1.03 ratio         PTT - ( 18 Dec 2018 01:14 )  PTT:25.9 sec            RADIOLOGY & ADDITIONAL TESTS:    Imaging Personally Reviewed:    Consultant(s) Notes Reviewed:      Care Discussed with Consultants/Other Providers:

## 2018-12-18 NOTE — PROGRESS NOTE ADULT - ASSESSMENT
62 yo F pt w/PMHX CAD, CHF (EF 50% 10/2018), TIA, PVD, ESRD HD MTThSat, last HD Sat, presenting w/nausea, vomiting, diarrhea, AMS, found to be in DKA. As per pt and chart notes, pt had Chinese food with her family yesterday, and the entire family then began experiencing dull abdominal pain and diarrhea. Pt endorses dull lower abdominal pain, NBNB vomiting x1, and diarrhea x1. she is now in dka as well as lactic acidosis and hyperkalemia    1- esrd  2- lactic acidosis  3- DKA  4- hyperkalemia improved with hd                 imp/suggest: ESRD      Hemodialysis Prescription:  	Access: avf  	Dialyzer: revaclear 300  	Blood Flow (mL/Min): 400  	Dialysate Flow (mL/Min): 600  	Target UF (Liters): 1.5 liter  	Treatment Time: 3.5 h  	Potassium: 2k  	Calcium: 2.5  	  YOLANDA    Vitamin D     continue with hd   see hd flow sheet  d/w icu team when pt seen

## 2018-12-18 NOTE — CONSULT NOTE ADULT - SUBJECTIVE AND OBJECTIVE BOX
CHIEF COMPLAINT: nausea    HISTORY OF PRESENT ILLNESS:  64 yo F pt w/PMHX CAD, CHF (EF 50% 10/2018), TIA, PVD, ESRD HD MTThSat, last HD Sat, presenting w/nausea, vomiting, diarrhea, AMS, found to be in DKA. As per pt and chart notes, pt had Chinese food with her family yesterday, and the entire family then began experiencing dull abdominal pain and diarrhea. Pt endorses dull lower abdominal pain, NBNB vomiting x1, and diarrhea x1. As per ED, pt was found minimally responsive, obtunded on couch, FS showing very high BG (no number given) so family brought her in. Denies F/C, CP/SOB. Pt is anuric. Of note pt frequently hospitalized for DKA - last hospitalization earlier this month. Has medtronic insulin pump basal rate 1.8u/hr. patient reports she didnt have all the neccesary equipment for her insulin pump. was admitted to micu for management of dka. currently denies any cp/sob/pnd/orthopnea or exertional sx          Allergies  No Known Allergies      MEDICATIONS:  aspirin enteric coated 81 milliGRAM(s) Oral daily  clopidogrel Tablet 75 milliGRAM(s) Oral daily  heparin  Injectable 5000 Unit(s) SubCutaneous every 8 hours  hydrALAZINE 100 milliGRAM(s) Oral every 8 hours  metoprolol succinate ER 50 milliGRAM(s) Oral daily  metroNIDAZOLE  IVPB 500 milliGRAM(s) IV Intermittent every 8 hours  piperacillin/tazobactam IVPB. 3.375 Gram(s) IV Intermittent every 12 hours  acetaminophen   Tablet .. 650 milliGRAM(s) Oral every 6 hours PRN  DULoxetine 60 milliGRAM(s) Oral daily  ondansetron Injectable 8 milliGRAM(s) IV Push every 8 hours PRN  atorvastatin 20 milliGRAM(s) Oral at bedtime  cinacalcet 60 milliGRAM(s) Oral daily  dextrose 40% Gel 15 Gram(s) Oral once PRN  dextrose 50% Injectable 50 milliLiter(s) IV Push every 15 minutes  dextrose 50% Injectable 25 milliLiter(s) IV Push every 15 minutes  glucagon  Injectable 1 milliGRAM(s) IntraMuscular once PRN  insulin Infusion 1 Unit(s)/Hr IV Continuous <Continuous>  dextrose 10%. 1000 milliLiter(s) IV Continuous <Continuous>  dextrose 5%. 1000 milliLiter(s) IV Continuous <Continuous>  multivitamin 1 Tablet(s) Oral daily      PAST MEDICAL & SURGICAL HISTORY:  CHF (congestive heart failure): EF 40-45%  Subclavian vein stenosis, left: s/p stent  DKA, type 1: 1/2015  ACS (acute coronary syndrome): 1/2015 - cath revealed 100% ostial stenosis not amenable to PCI - medical management  TIA (transient ischemic attack): x 2 - 8-9 years ago prior to ASD/VSD repair  CAD (coronary artery disease): s/p stents  Gout: past  CVA (cerebral infarction): with no residual, 8 yrs ago, prior to heart surgery - ST memory loss  Peripheral vascular disease: occluded left fem-pop bypass 5/2015  Diabetes mellitus type 1: Insulin Dependent - Medtronic  Minimed Paradigm Insulin Pump - Novolog  ESRD (end stage renal disease): dialysis  M, tue, thursday, saturday  Hyperlipidemia  Status post device closure of ASD: &quot;clamshell&quot;  History of cardiac catheterization: 1/2015 - no intervention  S/P femoral-popliteal bypass surgery: L and R in 2013 with graft; 5/2015 CFA angioplasty left and ileofemoral endarterectomywith vein patch angioplasty of left fem-pop bypass graft  Multiple vascular surgery both leg, left fempop bypass revision 11/2015  AV (arteriovenous fistula): Left AV graft; revision with stent placement 2-3 years ago  S/P cholecystectomy      FAMILY HISTORY:  Family history of smoking  Family history of hypertension  Family history of cancer (Sibling)      SOCIAL HISTORY:    former smoker. independent in adl's      REVIEW OF SYSTEMS:  See HPI, otherwise complete 10 point review of systems negative    [ ] All others negative	      PHYSICAL EXAM:  T(C): 36.8 (12-18-18 @ 08:00), Max: 37.7 (12-18-18 @ 04:00)  HR: 79 (12-18-18 @ 08:00) (75 - 89)  BP: 134/65 (12-18-18 @ 08:00) (134/65 - 196/84)  RR: 15 (12-18-18 @ 08:00) (13 - 23)  SpO2: 94% (12-18-18 @ 08:00) (94% - 100%)  Wt(kg): --  I&O's Summary    17 Dec 2018 07:01  -  18 Dec 2018 07:00  --------------------------------------------------------  IN: 1647 mL / OUT: 500 mL / NET: 1147 mL    18 Dec 2018 07:01  -  18 Dec 2018 08:14  --------------------------------------------------------  IN: 51 mL / OUT: 0 mL / NET: 51 mL        Appearance: No Acute Distress	  HEENT:  Normal oral mucosa, PERRL, EOMI	  Cardiovascular: Normal S1 S2, No JVD, No murmurs/rubs/gallops  Respiratory: Lungs clear to auscultation bilaterally  Gastrointestinal:  Soft, Non-tender, + BS	  Skin: No rashes, No ecchymoses, No cyanosis	  Neurologic: Non-focal  Extremities: No clubbing, cyanosis or edema  Vascular: Peripheral pulses palpable 2+ bilaterally  Psychiatry: A & O x 3, Mood & affect appropriate    Laboratory Data:	 	    CBC Full  -  ( 18 Dec 2018 01:14 )  WBC Count : 7.2 K/uL  Hemoglobin : 9.9 g/dL  Hematocrit : 29.9 %  Platelet Count - Automated : 239 K/uL  Mean Cell Volume : 102.0 fl  Mean Cell Hemoglobin : 33.8 pg  Mean Cell Hemoglobin Concentration : 33.1 gm/dL  Auto Neutrophil # : 5.5 K/uL  Auto Lymphocyte # : 1.0 K/uL  Auto Monocyte # : 0.6 K/uL  Auto Eosinophil # : 0.1 K/uL  Auto Basophil # : 0.0 K/uL  Auto Neutrophil % : 76.0 %  Auto Lymphocyte % : 13.7 %  Auto Monocyte % : 9.0 %  Auto Eosinophil % : 0.8 %  Auto Basophil % : 0.5 %    12-18    135  |  92<L>  |  13  ----------------------------<  163<H>  4.2   |  25  |  2.63<H>  12-18    136  |  93<L>  |  12  ----------------------------<  197<H>  4.1   |  24  |  2.30<H>    Ca    7.9<L>      18 Dec 2018 05:03  Ca    8.1<L>      18 Dec 2018 01:14  Phos  3.4     12-18  Phos  3.2     12-18  Mg     1.9     12-18  Mg     1.9     12-18    TPro  6.2  /  Alb  3.9  /  TBili  0.2  /  DBili  x   /  AST  72<H>  /  ALT  35  /  AlkPhos  87  12-17  TPro  7.4  /  Alb  4.6  /  TBili  0.3  /  DBili  x   /  AST  108<H>  /  ALT  47<H>  /  AlkPhos  104  12-17        Interpretation of Telemetry: nsr	    ECG:  	nsr prwp prolonged qt  	    Assessment:  - DKA in the setting of medication non-compliance and possible sepsis (GI source)  -ischemic cardiomyopathy, cad s/p multiple stents  -esrd on hd  -PAD s/p prior intervention   -remote TIA  -prolonged QTc  -HTN      Recs:  -management of DKA per MICU  -no true ischemic sx. ekg with nonspecific ST changes. would defer ischemic work up for now. avoid qtc prolonging meds. would repeat ecg once electrolytes repleted and dka improves   -c/w asa, plavix, statin, bb and hydralazine for ischemic cardiomyopathy and HTN  -dvt ppx        Greater than 60 minutes spent on total encounter; more than 50% of the visit was spent counseling and/or coordinating care by the attending physician.   	  Julián Biswas MD   Cardiovascular Diseases  [phone]

## 2018-12-18 NOTE — ADVANCED PRACTICE NURSE CONSULT - ASSESSMENT
62 y/o female with PMH CAD, CHF (EF 50% 10/2018), TIA, PVD, ESRD HD M/T/Th/Sat, , presenting w/nausea, vomiting, diarrhea, AMS, found to be in DKA.  Pt believes she got food poisoning after  eating Chinese Food on Friday, she then started experiencing extremely high FS values with dull lower abdominal pain, N/V/D.  Pt reports she tried to contact Dr. Ley (Primary Endocrine) since Friday) but unable to get in conact with her and she decided to present to the ED. NOTE - Pt frequently hospitalized for DKA - last hospitalization earlier this month.   Pt well known to [facility] Diabetes Team.  Pt wears Medtronic insulin pump but she left at home.  Pt educated extensively regarding the importance of problem solving and insulin pump management to avoid DKA, with verbalized understanding obtained but likelihood of pt adhering to plan of care recommendations extremely low.  Pt currently on insulin gtt and will be transitioned to basal-bolus regimen sometime today.

## 2018-12-18 NOTE — CONSULT NOTE ADULT - ATTENDING COMMENTS
Pt seen and examined. Agree with note as above with addendum: fellow tried to reach Dr. Ley as we need to better understand why the patient was unable to get her insulin. Unfortunately the office was not open and there was no answering service to leave a message. I am concerned that she is too cognitively impaired to use insulin pens, but she also has problems troubleshooting pump issues, as a result her outpatient endocrinologist needs to be in the discussion about her outpatient regimen.  Paris Colbert MD  [phone]

## 2018-12-18 NOTE — CHART NOTE - NSCHARTNOTEFT_GEN_A_CORE
MICU Transfer Note    Joesph Dietz MD  PGY-1  Extension x1622    Transfer from: MICU    Transfer to: (x) Medicine    (  ) Telemetry     (   ) RCU        (    ) Palliative         (   ) Stroke Unit          (   ) __________________    Accepting Physician:   Signout given to:     HPI:   62 yo F pt w/PMHX CAD, CHF (EF 50% 10/2018), TIA, PVD, ESRD HD [facility], last HD Sat, presenting w/nausea, vomiting, diarrhea, AMS, found to be in DKA. As per pt and chart notes, pt had Chinese food with her family yesterday, and the entire family then began experiencing dull abdominal pain and diarrhea. Pt endorses dull lower abdominal pain, NBNB vomiting x1, and diarrhea x1. As per ED, pt was found minimally responsive, obtunded on couch, FS showing very high BG (no number given) so family brought her in. Denies F/C, CP/SOB. Pt is anuric. Of note pt frequently hospitalized for DKA - last hospitalization earlier this month. Has medtronic insulin pump basal rate 1.8u/hr.    MICU COURSE:  Patient continue on insulin gtts and dextrose. AMS resolved and glucose and anion gap improved. Pt was on 1units/hr until noon today and switched to 4U NPH and give lunch. Pt has no complaints. VSS. Patient is currently ambulating independently in unit and feeling well. Endocrinology recommending transition to basal-bolus insulin with Lantus 9 units at midnight and Humalog 3 units with low correction scale.     ASSESSMENT & PLAN:     Assessment and plan:  63F, h.o ESRD (MTThSa), DM (medtronic pump), CAD, CHF, TIA, PVD, p.w vomiting, diarrhea, AMS.     #NEUROLOGY: AMS  - AMS resolved, mostly due to DKA  - AAOx3    #PULMONOLOGY: Not intubated  - no acute issues    #CARDIOVASCULAR: CAD   - metoprolol 50 qd  - start hydralazine 100 q8hr     #GASTROENTEROLOGY:   a) transaminitis  - LFTs trending down   - chronic dilated intrahepatic biliary duct, CBD  - mostly post - cholecystectomy    b) nausea & vomiting  - 2/2 DKA vs enteric infection   - afebrile, no wbc, unlikely infection, will d/c zosyn and vanco     #RENAL / FLUID  a) ESRD  - HD last night, [facility]  - taken out 500ml   - HD tonight     #INFECTIOUS DISEASE  a) abdominal pain, emesis, possible infectious etiology   - CT doesn't show infectious   - d/c abx   	  #HEMATOLOGY/DVT PPx  - heparin for DVT     #ENDOCRINE  a) DKA   - Anion gap closing at 19, patient has h.o persistent anion gap due to ESRD   - consult endo basal insulin  - bridge insulin     #SKIN/LINES  - peripheral line - no ulcers     FOR FOLLOW UP:  - endocrinology for insulin dose MICU Transfer Note    Joesph Dietz MD  PGY-1  Extension x1622    Transfer from: MICU    Transfer to: (x) Medicine    (  ) Telemetry     (   ) RCU        (    ) Palliative         (   ) Stroke Unit          (   ) __________________    Accepting Physician: Pierce Shin   Signout given to:     HPI:   62 yo F pt w/PMHX CAD, CHF (EF 50% 10/2018), TIA, PVD, ESRD HD [facility], last HD Sat, presenting w/nausea, vomiting, diarrhea, AMS, found to be in DKA. As per pt and chart notes, pt had Chinese food with her family yesterday, and the entire family then began experiencing dull abdominal pain and diarrhea. Pt endorses dull lower abdominal pain, NBNB vomiting x1, and diarrhea x1. As per ED, pt was found minimally responsive, obtunded on couch, FS showing very high BG (no number given) so family brought her in. Denies F/C, CP/SOB. Pt is anuric. Of note pt frequently hospitalized for DKA - last hospitalization earlier this month. Has medtronic insulin pump basal rate 1.8u/hr.    MICU COURSE:  Patient continue on insulin gtts and dextrose. AMS resolved and glucose and anion gap improved. Pt was on 1units/hr until noon today and switched to 4U NPH and give lunch. Pt has no complaints. VSS. Patient is currently ambulating independently in unit and feeling well. Endocrinology recommending transition to basal-bolus insulin with Lantus 9 units at midnight and Humalog 3 units with low correction scale.     ASSESSMENT & PLAN:     Assessment and plan:  63F, h.o ESRD (MTThSa), DM (medtronic pump), CAD, CHF, TIA, PVD, p.w vomiting, diarrhea, AMS.     #NEUROLOGY: AMS  - AMS resolved, mostly due to DKA  - AAOx3    #PULMONOLOGY: Not intubated  - no acute issues    #CARDIOVASCULAR: CAD   - metoprolol 50 qd  - start hydralazine 100 q8hr     #GASTROENTEROLOGY:   a) transaminitis  - LFTs trending down   - chronic dilated intrahepatic biliary duct, CBD  - mostly post - cholecystectomy    b) nausea & vomiting  - 2/2 DKA vs enteric infection   - afebrile, no wbc, unlikely infection, will d/c zosyn and vanco     #RENAL / FLUID  a) ESRD  - HD last night, [facility]  - taken out 500ml   - HD tonight     #INFECTIOUS DISEASE  a) abdominal pain, emesis, possible infectious etiology   - CT doesn't show infectious   - d/c abx   	  #HEMATOLOGY/DVT PPx  - heparin for DVT     #ENDOCRINE  a) DKA   - Anion gap closing at 19, patient has h.o persistent anion gap due to ESRD   - consult endo basal insulin  - bridge insulin     #SKIN/LINES  - peripheral line - no ulcers     FOR FOLLOW UP:  - endocrinology for insulin dose MICU Transfer Note    Joesph Dietz MD  PGY-1  Extension x1622    Transfer from: MICU    Transfer to: (x) Medicine    (  ) Telemetry     (   ) RCU        (    ) Palliative         (   ) Stroke Unit          (   ) __________________    Accepting Physician: Pierce Shin   Signout given to:     HPI:   64 yo F pt w/PMHX CAD, CHF (EF 50% 10/2018), TIA, PVD, ESRD HD [facility], last HD Sat, presenting w/nausea, vomiting, diarrhea, AMS, found to be in DKA. As per pt and chart notes, pt had Chinese food with her family yesterday, and the entire family then began experiencing dull abdominal pain and diarrhea. Pt endorses dull lower abdominal pain, NBNB vomiting x1, and diarrhea x1. As per ED, pt was found minimally responsive, obtunded on couch, FS showing very high BG (no number given) so family brought her in. Denies F/C, CP/SOB. Pt is anuric. Of note pt frequently hospitalized for DKA - last hospitalization earlier this month. Has medtronic insulin pump basal rate 1.8u/hr.    MICU COURSE:  Patient continue on insulin gtts and dextrose. AMS resolved and glucose and anion gap improved. Pt was on 1units/hr until noon today and switched to 4U NPH and give lunch. Pt has no complaints. VSS. Patient is currently ambulating independently in unit and feeling well. Endocrinology recommending transition to basal-bolus insulin with Lantus 9 units at midnight and Humalog 3 units with low correction scale.     ASSESSMENT & PLAN:     Assessment and plan:  63F, h.o ESRD (MTThSa), DM (medtronic pump), CAD, CHF, TIA, PVD, p.w vomiting, diarrhea, AMS.     #NEUROLOGY: AMS  - AMS resolved, mostly due to DKA  - AAOx3    #PULMONOLOGY: Not intubated  - no acute issues    #CARDIOVASCULAR: CAD   - metoprolol 50 qd  - start hydralazine 100 q8hr     #GASTROENTEROLOGY:   a) transaminitis  - LFTs trending down   - chronic dilated intrahepatic biliary duct, CBD  - mostly post - cholecystectomy    b) nausea & vomiting  - 2/2 DKA vs enteric infection   - afebrile, no wbc, unlikely infection, will d/c zosyn and vanco     #RENAL / FLUID  a) ESRD  - HD last night, [facility]  - taken out 500ml   - HD tonight     #INFECTIOUS DISEASE  a) abdominal pain, emesis, possible infectious etiology   - CT doesn't show infectious   - d/c abx   	  #HEMATOLOGY/DVT PPx  - heparin for DVT     #ENDOCRINE  a) DKA   - Anion gap closed  - consult endo basal insulin  - bridge insulin and given 4U Humalog before meal, 9U lantus q11pm    #SKIN/LINES  - peripheral line - no ulcers     FOR FOLLOW UP:  - endocrinology for insulin dose  - MRCP MICU Transfer Note    Joesph Dietz MD  PGY-1  Extension x1622    Transfer from: MICU    Transfer to: (x) Medicine    (  ) Telemetry     (   ) RCU        (    ) Palliative         (   ) Stroke Unit          (   ) __________________    Accepting Physician: Pierce Shin   Signout given to:     HPI:   62 yo F pt w/ PMHX CAD, CHF (EF 50% 10/2018), TIA, PVD, ESRD HD [facility], last HD Sat, DM type 1 (Medtronic insulin pump) presenting w/nausea, vomiting, diarrhea, AMS, found to be in DKA. As per pt and chart notes, pt had Chinese food with her family yesterday, and the entire family then began experiencing dull abdominal pain and diarrhea. Pt endorses dull lower abdominal pain, NBNB vomiting x1, and diarrhea x1. As per ED, pt was found minimally responsive, obtunded on couch, FS showing very high BG (no number given) so family brought her in. Denies F/C, CP/SOB. Pt is anuric. Of note pt frequently hospitalized for DKA - last hospitalization earlier this month. Has medtronic insulin pump basal rate 1.8u/hr.    MICU COURSE:  Patient continue on insulin gtts and dextrose. AMS resolved and glucose and anion gap improved. Pt was on 1units/hr until noon 12/18, switched to 4U NPH and given lunch. Pt has no complaints. VSS. AG closed, beta hydroxy-butyrate 0.2. Patient is currently ambulating independently in unit and tolerating regular diet. Endocrinology recommending transition to basal-bolus insulin with Lantus 9 units qHS and Humalog 3 units premeal with JADEN.    ASSESSMENT & PLAN:   62yo F with hx ESRD (MTuThSa), DM type 1 (Medtronic insulin pump), CAD, CHF, TIA, PVD, p.w vomiting, diarrhea, AMS 2/2 DKA now resolved.     #NEUROLOGY: AMS  - AMS resolved, 2/2 metabolic encephalopathy from DKA  - AAOx3    #PULMONOLOGY:  - no acute issues, on RA    #CARDIOVASCULAR: CAD   Prolonged QTc  - metoprolol 50 qd, hydralazine 100 q8hr   -Hypertensive, restart home lisinopril 40mg daily  -EKG today for QTc    #GASTROENTEROLOGY:   a) Chronically dilated intrahepatic biliary duct  -possibly due to s/p cholecystectomy  -MRCP today 9:30pm, NPO 4 hours prior    b) transaminitis  - LFTs trending down, continue to monitor. Possibly 2/2 fatty liver    c) nausea & vomiting: resolved  - 2/2 DKA  - afebrile, no wbc, unlikely infection, off abx    #RENAL / FLUID: ESRD on MTuThSat  - HD tmrw  -continue phos binder    #INFECTIOUS DISEASE  -Possible gastroenteritis vs N/V due to DKA  - CT doesn't show infectious   - monitor off abx  	  #HEMATOLOGY/DVT PPx  - DVT ppx: HSQ    #ENDOCRINE: DKA after insulin noncompliance and possible gastroenteritis from food  - Anion gap closed, beta hydroxy-butyrate 0.2  - f/u endocrine recs  - Lantus 7u qHS for now    #SKIN/LINES  - peripheral line - no ulcers     FOR FOLLOW UP:  - endocrinology for insulin dosing  - MRCP, consent form filled out MICU Transfer Note    Joesph Dietz MD  PGY-1  Extension x1622    Transfer from: MICU    Transfer to: (x) Medicine    (  ) Telemetry     (   ) RCU        (    ) Palliative         (   ) Stroke Unit          (   ) __________________    Accepting Physician: Pierce Shin   Signout given to:     HPI:   64 yo F pt w/ PMHX CAD, CHF (EF 50% 10/2018), TIA, PVD, ESRD HD [facility], last HD Sat, DM type 1 (Medtronic insulin pump) presenting w/nausea, vomiting, diarrhea, AMS, found to be in DKA. As per pt and chart notes, pt had Chinese food with her family yesterday, and the entire family then began experiencing dull abdominal pain and diarrhea. Pt endorses dull lower abdominal pain, NBNB vomiting x1, and diarrhea x1. As per ED, pt was found minimally responsive, obtunded on couch, FS showing very high BG (no number given) so family brought her in. Denies F/C, CP/SOB. Pt is anuric. Of note pt frequently hospitalized for DKA - last hospitalization earlier this month. Has medtronic insulin pump basal rate 1.8u/hr.    MICU COURSE:  Patient continue on insulin gtts and dextrose. AMS resolved and glucose and anion gap improved. Pt was on 1units/hr until noon 12/18, switched to 4U NPH and given lunch. Pt has no complaints. VSS. AG closed, beta hydroxy-butyrate 0.2. Patient is currently ambulating independently in unit and tolerating regular diet. Endocrinology recommending transition to basal-bolus insulin with Lantus 9 units qHS and Humalog 3 units premeal with JADEN.    ASSESSMENT & PLAN:   64yo F with hx ESRD (MTuThSa), DM type 1 (Medtronic insulin pump), CAD, CHF, TIA, PVD, p.w vomiting, diarrhea, AMS 2/2 DKA now resolved.     #NEUROLOGY: AMS  - AMS resolved, 2/2 metabolic encephalopathy from DKA  - AAOx3    #PULMONOLOGY:  - no acute issues, on RA    #CARDIOVASCULAR: CAD   Prolonged QTc  - metoprolol 50 qd, hydralazine 100 q8hr   -Hypertensive, restart home lisinopril 40mg daily  -EKG today for QTc    #GASTROENTEROLOGY:   a) Chronically dilated intrahepatic biliary duct  -possibly due to s/p cholecystectomy  -MRCP today 9:30pm, NPO 4 hours prior    b) transaminitis  - LFTs trending down, continue to monitor. Possibly 2/2 fatty liver    c) nausea & vomiting: resolved  - 2/2 DKA  - afebrile, no wbc, unlikely infection, off abx    #RENAL / FLUID: ESRD on MTuThSat  - HD tmrw  -continue phos binder    #INFECTIOUS DISEASE  -Possible gastroenteritis vs N/V due to DKA  - CT doesn't show infectious   - monitor off abx  	  #HEMATOLOGY/DVT PPx  - DVT ppx: HSQ    #ENDOCRINE: DKA after insulin noncompliance and possible gastroenteritis from food  - Anion gap closed, beta hydroxy-butyrate 0.2  - f/u endocrine recs  - Lantus 7u qHS for now    #SKIN/LINES  - peripheral line - no ulcers     FOR FOLLOW UP:  -endocrinology for insulin dosing  -MRCP at 9:30pm (12/19), consent form filled out, NPO 4 hours prior  -restart diet  -f/u QTc  -HD Thursday MICU Transfer Note    Joesph Dietz MD  PGY-1  Extension x1622    Transfer from: MICU    Transfer to: (x) Medicine    (  ) Telemetry     (   ) RCU        (    ) Palliative         (   ) Stroke Unit          (   ) __________________    Accepting Physician: Pierce Shin   Signout given to:     HPI:   64 yo F pt w/ PMHX CAD, CHF (EF 50% 10/2018), TIA, PVD, ESRD HD [facility], last HD Sat, DM type 1 (Medtronic insulin pump) presenting w/nausea, vomiting, diarrhea, AMS, found to be in DKA. As per pt and chart notes, pt had Chinese food with her family yesterday, and the entire family then began experiencing dull abdominal pain and diarrhea. Pt endorses dull lower abdominal pain, NBNB vomiting x1, and diarrhea x1. As per ED, pt was found minimally responsive, obtunded on couch, FS showing very high BG (no number given) so family brought her in. Denies F/C, CP/SOB. Pt is anuric. Of note pt frequently hospitalized for DKA - last hospitalization earlier this month. Has medtronic insulin pump basal rate 1.8u/hr.    MICU COURSE:  Patient continue on insulin gtts and dextrose. AMS resolved and glucose and anion gap improved. Pt was on 1units/hr until noon 12/18, switched to 4U NPH and given lunch. Pt has no complaints. VSS. AG closed, beta hydroxy-butyrate 0.2. Patient is currently ambulating independently in unit and tolerating regular diet. Endocrinology recommending transition to basal-bolus insulin with Lantus 9 units qHS and Humalog 3 units premeal with JADEN.    ASSESSMENT & PLAN:   62yo F with hx ESRD (MTuThSa), DM type 1 (Medtronic insulin pump), CAD, CHF, TIA, PVD, p.w vomiting, diarrhea, AMS 2/2 DKA now resolved.     #NEUROLOGY: AMS  - AMS resolved, 2/2 metabolic encephalopathy from DKA  - AAOx3    #PULMONOLOGY:  - no acute issues, on RA    #CARDIOVASCULAR: CAD   Prolonged QTc  - metoprolol 50 qd, hydralazine 100 q8hr   -Hypertensive, restart home lisinopril 40mg daily  -QTc 491 (12/19)    #GASTROENTEROLOGY:   a) Chronically dilated intrahepatic biliary duct  -possibly due to s/p cholecystectomy  -MRCP today 9:30pm, NPO 4 hours prior    b) transaminitis  - LFTs trending down, continue to monitor. Possibly 2/2 fatty liver    c) nausea & vomiting: resolved  - 2/2 DKA  - afebrile, no wbc, unlikely infection, off abx    #RENAL / FLUID: ESRD on MTuThSat  - HD tmrw  -continue phos binder    #INFECTIOUS DISEASE  -Possible gastroenteritis vs N/V due to DKA  - CT doesn't show infectious   - monitor off abx  	  #HEMATOLOGY/DVT PPx  - DVT ppx: HSQ    #ENDOCRINE: DKA after insulin noncompliance and possible gastroenteritis from food  - Anion gap closed, beta hydroxy-butyrate 0.2  - f/u endocrine recs  - Lantus 7u qHS for now    #SKIN/LINES  - peripheral line - no ulcers     FOR FOLLOW UP:  -endocrinology for insulin dosing  -MRCP at 9:30pm (12/19), consent form filled out, NPO 4 hours prior  -restart diet  -HD Thursday MICU Transfer Note    Joesph Dietz MD  PGY-1  Extension x1622    Transfer from: MICU    Transfer to: (x) Medicine    (  ) Telemetry     (   ) RCU        (    ) Palliative         (   ) Stroke Unit          (   ) __________________    Accepting Physician: Pierce Shin   Signout given to:     HPI:   64 yo F pt w/ PMHX CAD, CHF (EF 50% 10/2018), TIA, PVD, ESRD HD [facility]Th[facility], last HD Sat, DM type 1 (Medtronic insulin pump) presenting w/nausea, vomiting, diarrhea, AMS, found to be in DKA. As per pt and chart notes, pt had Chinese food with her family yesterday, and the entire family then began experiencing dull abdominal pain and diarrhea. Pt endorses dull lower abdominal pain, NBNB vomiting x1, and diarrhea x1. As per ED, pt was found minimally responsive, obtunded on couch, FS showing very high BG (no number given) so family brought her in. Denies F/C, CP/SOB. Pt is anuric. Of note pt frequently hospitalized for DKA - last hospitalization earlier this month. Has medtronic insulin pump basal rate 1.8u/hr.    MICU COURSE:  Patient continue on insulin gtts and dextrose. AMS resolved and glucose and anion gap improved. Pt was on 1units/hr until noon 12/18, switched to 4U NPH and given lunch. Pt has no complaints. VSS. AG closed, beta hydroxy-butyrate 0.2. Patient is currently ambulating independently in unit and tolerating regular diet. Endocrinology recommending transition to basal-bolus insulin with Lantus 8 units qHS and Humalog 4 units premeal with JADEN.    ASSESSMENT & PLAN:   62yo F with hx ESRD (MTuThSa), DM type 1 (Medtronic insulin pump), CAD, CHF, TIA, PVD, p.w vomiting, diarrhea, AMS 2/2 DKA now resolved.     #NEUROLOGY: AMS  - AMS resolved, 2/2 metabolic encephalopathy from DKA  - AAOx3    #PULMONOLOGY:  - no acute issues, on RA    #CARDIOVASCULAR: CAD   Prolonged QTc  - metoprolol 50 qd, hydralazine 100 q8hr   -Hypertensive, restart home lisinopril 40mg daily  -QTc 491 (12/19)    #GASTROENTEROLOGY:   a) Chronically dilated intrahepatic biliary duct  -possibly due to s/p cholecystectomy  -MRCP today 9:30pm, NPO 4 hours prior    b) transaminitis  - LFTs trending down, continue to monitor. Possibly 2/2 fatty liver    c) nausea & vomiting: resolved  - 2/2 DKA  - afebrile, no wbc, unlikely infection, off abx    #RENAL / FLUID: ESRD on MTuThSat  - HD tmrw  -continue phos binder    #INFECTIOUS DISEASE  -Possible gastroenteritis vs N/V due to DKA  - CT doesn't show infectious   - monitor off abx  	  #HEMATOLOGY/DVT PPx  - DVT ppx: HSQ    #ENDOCRINE: DKA after insulin noncompliance and possible gastroenteritis from food  DM type 1 (Medtronic insulin pump at home)  - Anion gap closed, beta hydroxy-butyrate 0.2  - f/u endocrine recs  - Lantus 8u qhs, humalog 4u premeal. If giving spot coverage, give humalog    #SKIN/LINES  - peripheral line - no ulcers     FOR FOLLOW UP:  -endocrinology for insulin dosing  -MRCP at 9:30pm (12/19), consent form filled out, NPO 4 hours prior  -restart diet  -HD Thursday

## 2018-12-18 NOTE — PROGRESS NOTE ADULT - ASSESSMENT
Assessment and plan:  63F, h.o ESRD (MTThSa), DM (medtronic pump), CAD, CHF, TIA, PVD, p.w vomiting, diarrhea, AMS.     #NEUROLOGY: AMS  - AMS resolved, mostly due to DKA  - AAOx3    #PULMONOLOGY: Not intubated  - decreased breath sound - follow-up cxr     #CARDIOVASCULAR: CAD   - metoprolol 50 qd  - start hydralazine 100 q8hr     #GASTROENTEROLOGY:   a) transaminitis  - dilated intrahepatic biliary duct, CBD - can't r.o hepatic mass   - rpt LFT's and possible MRI/MRCP  b) nausea & vomiting  - 2/2 DKA vs enteric infection   - flangyl and zofran (12/17-     #RENAL / FLUID    #INFECTIOUS DISEASE    #HEMATOLOGY/DVT PPx    #ENDOCRINE    #SKIN/LINES Assessment and plan:  63F, h.o ESRD (MTThSa), DM (medtronic pump), CAD, CHF, TIA, PVD, p.w vomiting, diarrhea, AMS.     #NEUROLOGY: AMS  - AMS resolved, mostly due to DKA  - AAOx3    #PULMONOLOGY: Not intubated  - decreased breath sound - follow-up cxr     #CARDIOVASCULAR: CAD   - metoprolol 50 qd  - start hydralazine 100 q8hr     #GASTROENTEROLOGY:   a) transaminitis  - dilated intrahepatic biliary duct, CBD - can't r.o hepatic mass  - rpt LFT's and possible MRI/MRCP  - consult GI     b) nausea & vomiting  - 2/2 DKA vs enteric infection   - flangyl and zofran (12/17-     #RENAL / FLUID  a) ESRD  - HD last night, [facility]  - taken out 500ml     #INFECTIOUS DISEASE  a) abdominal pain, emesis, possible infectious etiology   - flagyl 500 q8 and zosyn q12   - CT doesnt show inflam.   - d/c abx?   	  #HEMATOLOGY/DVT PPx  - heparin for DVT     #ENDOCRINE  a) DKA   - Anion gap clsoing at 19, patient has h.o persistent anion gap   - consult endo for medtronic pump  - bridge insulin     #SKIN/LINES  - peripheral line - no ulcers Assessment and plan:  63F, h.o ESRD (MTThSa), DM (medtronic pump), CAD, CHF, TIA, PVD, p.w vomiting, diarrhea, AMS.     #NEUROLOGY: AMS  - AMS resolved, mostly due to DKA  - AAOx3    #PULMONOLOGY: Not intubated  - no acute issues    #CARDIOVASCULAR: CAD   - metoprolol 50 qd  - start hydralazine 100 q8hr     #GASTROENTEROLOGY:   a) transaminitis  - dilated intrahepatic biliary duct, CBD - can't r.o hepatic mass  - rpt LFT's and possible MRI/MRCP  - consult GI     b) nausea & vomiting  - 2/2 DKA vs enteric infection   - afebrile, no wbc, will d/c zosyn and vanco     #RENAL / FLUID  a) ESRD  - HD last night, MTThSat  - taken out 500ml     #INFECTIOUS DISEASE  a) abdominal pain, emesis, possible infectious etiology   - flagyl 500 q8 and zosyn q12   - CT doesn't show infectious   - d/c abx   	  #HEMATOLOGY/DVT PPx  - heparin for DVT     #ENDOCRINE  a) DKA   - Anion gap clsoing at 19, patient has h.o persistent anion gap   - consult endo for medtronic pump  - bridge insulin     #SKIN/LINES  - peripheral line - no ulcers Assessment and plan:  63F, h.o ESRD (MTThSa), DM (medtronic pump), CAD, CHF, TIA, PVD, p.w vomiting, diarrhea, AMS.     #NEUROLOGY: AMS  - AMS resolved, mostly due to DKA  - AAOx3    #PULMONOLOGY: Not intubated  - no acute issues    #CARDIOVASCULAR: CAD   - metoprolol 50 qd  - start hydralazine 100 q8hr     #GASTROENTEROLOGY:   a) transaminitis  - LFTs trending down   - chronic dilated intrahepatic biliary duct, CBD  - mostly post - cholecystectomy    b) nausea & vomiting  - 2/2 DKA vs enteric infection   - afebrile, no wbc, unlikely infection, will d/c zosyn and vanco     #RENAL / FLUID  a) ESRD  - HD last night, MTThSat  - taken out 500ml   - HD tonight     #INFECTIOUS DISEASE  a) abdominal pain, emesis, possible infectious etiology   - CT doesn't show infectious   - d/c abx   	  #HEMATOLOGY/DVT PPx  - heparin for DVT     #ENDOCRINE  a) DKA   - Anion gap closing at 19, patient has h.o persistent anion gap due to ESRD   - consult endo basal insulin  - bridge insulin     #SKIN/LINES  - peripheral line - no ulcers Assessment and plan:  63F, h.o ESRD (MTThSa), DM (medtronic pump), CAD, CHF, TIA, PVD, p.w vomiting, diarrhea, AMS.     #NEUROLOGY: AMS  - AMS resolved, mostly due to DKA  - AAOx3    #PULMONOLOGY: Not intubated  - no acute issues    #CARDIOVASCULAR: CAD   - metoprolol 50 qd  - start hydralazine 100 q8hr     #GASTROENTEROLOGY:   a) transaminitis  - LFTs trending down   - chronic dilated intrahepatic biliary duct, CBD  - mostly due to post-cholecystectomy  - no abdominal complaint    b) nausea & vomiting  - 2/2 DKA vs enteric infection   - afebrile, no wbc, unlikely infection, will d/c zosyn and vanco     #RENAL / FLUID  a) ESRD  - HD last night, MTThSat  - taken out 500ml   - HD tonight     #INFECTIOUS DISEASE  a) abdominal pain, emesis, possible infectious etiology   - CT doesn't show infectious   - d/c abx   	  #HEMATOLOGY/DVT PPx  - heparin for DVT     #ENDOCRINE  a) DKA   - Anion gap closing at 19, patient has h.o persistent anion gap due to ESRD   - consult endo basal insulin  - bridge insulin     #SKIN/LINES  - peripheral line - no ulcers

## 2018-12-18 NOTE — PROGRESS NOTE ADULT - SUBJECTIVE AND OBJECTIVE BOX
[facility]   [phone]  DIALYSIS NOTE  Chief Complaint: ESRD/Ongoing hemodialysis requirement.     24 hour events/subjective:    seen earlier. still on insulin drip         ALLERGIES & MEDICATIONS  --------------------------------------------------------------------------------  Allergies    No Known Allergies    Intolerances      Standing Inpatient Medications  aspirin enteric coated 81 milliGRAM(s) Oral daily  atorvastatin 20 milliGRAM(s) Oral at bedtime  cinacalcet 60 milliGRAM(s) Oral daily  clopidogrel Tablet 75 milliGRAM(s) Oral daily  dextrose 5%. 1000 milliLiter(s) IV Continuous <Continuous>  dextrose 50% Injectable 50 milliLiter(s) IV Push every 15 minutes  dextrose 50% Injectable 25 milliLiter(s) IV Push every 15 minutes  DULoxetine 60 milliGRAM(s) Oral daily  heparin  Injectable 5000 Unit(s) SubCutaneous every 8 hours  hydrALAZINE 100 milliGRAM(s) Oral every 8 hours  insulin glargine Injectable (LANTUS) 9 Unit(s) SubCutaneous <User Schedule>  insulin lispro (HumaLOG) corrective regimen sliding scale   SubCutaneous three times a day before meals  insulin lispro (HumaLOG) corrective regimen sliding scale   SubCutaneous at bedtime  insulin lispro Injectable (HumaLOG) 4 Unit(s) SubCutaneous three times a day before meals  metoprolol succinate ER 50 milliGRAM(s) Oral daily  multivitamin 1 Tablet(s) Oral daily  sevelamer hydrochloride 800 milliGRAM(s) Oral three times a day with meals    PRN Inpatient Medications  acetaminophen   Tablet .. 650 milliGRAM(s) Oral every 6 hours PRN  dextrose 40% Gel 15 Gram(s) Oral once PRN  glucagon  Injectable 1 milliGRAM(s) IntraMuscular once PRN  ondansetron Injectable 8 milliGRAM(s) IV Push every 8 hours PRN      REVIEW OF SYSTEMS  --------------------------------------------------------------------------------  no itching or rash  no fever or chill  no cp or palp   no sob or cough   no N/V/D/ no abd pain   ext no edema          VITALS/PHYSICAL EXAM  --------------------------------------------------------------------------------  T(C): 36.6 (12-18-18 @ 12:20), Max: 37.7 (12-18-18 @ 04:00)  HR: 76 (12-18-18 @ 14:00) (75 - 89)  BP: 136/58 (12-18-18 @ 12:20) (134/65 - 196/84)  RR: 14 (12-18-18 @ 14:00) (13 - 23)  SpO2: 96% (12-18-18 @ 14:00) (94% - 100%)  Wt(kg): --  Height (cm): 154.94 (12-17-18 @ 18:48)  Weight (kg): 53.8 (12-17-18 @ 18:48)  BMI (kg/m2): 22.4 (12-17-18 @ 18:48)  BSA (m2): 1.51 (12-17-18 @ 18:48)      12-17-18 @ 07:01  -  12-18-18 @ 07:00  --------------------------------------------------------  IN: 1647 mL / OUT: 500 mL / NET: 1147 mL    12-18-18 @ 07:01  -  12-18-18 @ 15:49  --------------------------------------------------------  IN: 153 mL / OUT: 0 mL / NET: 153 mL      Physical Exam:  		    	Gen: alert oriented place person and date   	Pulm: Decreased breath sounds b/l bases no rales or ronchi  	CV: RRR, S1/S2  	Abd: +BS, soft, nontender/nondistended  	Extremity: No cyanosis, no edema no clubbing    	    LABS/STUDIES  --------------------------------------------------------------------------------              9.9    7.2   >-----------<  239      [12-18-18 @ 01:14]              29.9     133  |  92  |  14  ----------------------------<  162      [12-18-18 @ 12:57]  4.6   |  27  |  3.14        Ca     8.0     [12-18-18 @ 12:57]      Mg     1.9     [12-18-18 @ 12:57]      Phos  4.2     [12-18-18 @ 12:57]    TPro  6.2  /  Alb  3.9  /  TBili  0.2  /  DBili  x   /  AST  72  /  ALT  35  /  AlkPhos  87  [12-17-18 @ 19:32]    PT/INR: PT 11.8 , INR 1.03       [12-18-18 @ 01:14]  PTT: 25.9       [12-18-18 @ 01:14]    Serum Osmolality 338      [12-17-18 @ 15:15]

## 2018-12-18 NOTE — CONSULT NOTE ADULT - PROBLEM SELECTOR RECOMMENDATION 9
- DKA occurred in setting of patient running out of insulin for her insulin pump and recent GI illness  - she is no longer on DKA  - spoke with MICU team, recommend administer NPH 4 units x 1 (already given) and order Lantus 9 units to be given at 11 pm  - start Humalog 4 units TID  - start low correction scale qac and qhs  - consistent carb diet  - will follow

## 2018-12-18 NOTE — PROGRESS NOTE ADULT - ASSESSMENT
63 f with  DKA- IVF, Insulin, Endocrine evaluation  CAD stable.   HTN control  ESRD- Nephrology evaluation Dr. Brayan kang HD  LFT abnormal- follow  Empiric antibiotics  MICU care  Pierce Viera MD pager [phone]

## 2018-12-18 NOTE — PROGRESS NOTE ADULT - SUBJECTIVE AND OBJECTIVE BOX
MICU PROGRESS NOTE    Joesph Dietz MD  PGY-1  Extension [phone]    CHIEF COMPLAINT: DKA    Interval events / Subjective:   HD last night. no other acute events. mental status improved, anion gap closing, total 16U insulin, still on 1U/hr. glucose tending down to 150s, electrolytes stable. Pt has no complaints besides wanting to get her medtronic pump.     REVIEW OF SYSTEMS:  Constitutional: [ ] negative [ ] fevers [ ] chills [ ] weight loss [ ] weight gain  HEENT: [ ] negative [ ] dry eyes [ ] eye irritation [ ] postnasal drip [ ] nasal congestion  CV: [ ] negative  [ ] chest pain [ ] orthopnea [ ] palpitations [ ] murmur  Resp: [ ] negative [ ] cough [ ] shortness of breath [ ] dyspnea [ ] wheezing [ ] sputum [ ] hemoptysis  GI: [ ] negative [ ] nausea [ ] vomiting [ ] diarrhea [ ] constipation [ ] abd pain [ ] dysphagia   : [ ] negative [ ] dysuria [ ] nocturia [ ] hematuria [ ] increased urinary frequency  Musculoskeletal: [ ] negative [ ] back pain [ ] myalgias [ ] arthralgias [ ] fracture  Skin: [ ] negative [ ] rash [ ] itch  Neurological: [ ] negative [ ] headache [ ] dizziness [ ] syncope [ ] weakness [ ] numbness  Psychiatric: [ ] negative [ ] anxiety [ ] depression  Endocrine: [ ] negative [ ] diabetes [ ] thyroid problem  Hematologic/Lymphatic: [ ] negative [ ] anemia [ ] bleeding problem  Allergic/Immunologic: [ ] negative [ ] itchy eyes [ ] nasal discharge [ ] hives [ ] angioedema  [ ] All other systems negative  [ ] Unable to assess ROS because ________    OBJECTIVE:  ICU Vital Signs Last 24 Hrs  T(C): 37.2 (18 Dec 2018 00:00), Max: 37.3 (17 Dec 2018 22:48)  T(F): 98.9 (18 Dec 2018 00:00), Max: 99.1 (17 Dec 2018 22:48)  HR: 84 (18 Dec 2018 04:00) (75 - 84)  BP: 178/81 (18 Dec 2018 04:00) (135/62 - 196/84)  BP(mean): 116 (18 Dec 2018 04:00) (91 - 120)  ABP: --  ABP(mean): --  RR: 13 (18 Dec 2018 04:00) (13 - 23)  SpO2: 97% (18 Dec 2018 04:00) (94% - 100%)        12-17 @ 07:01  -  12-18 @ 06:30  --------------------------------------------------------  IN: 1396 mL / OUT: 500 mL / NET: 896 mL      CAPILLARY BLOOD GLUCOSE      POCT Blood Glucose.: 157 mg/dL (18 Dec 2018 06:05)      PHYSICAL EXAM:  General: NAD, good hygiene, well developed  HENT: Atraumatic, PERRLA, no conjunctivae injection  Neck: normal ROM and trachea midline   Cardiovascular: RRR, S1&2, no M or R, radial pulses equal and b/l  Respiratory: LLL crackles, decreased breath sounds on right   Abdominal:  soft and non-tender non distended, neg CVA tenderness   Extremities: +1 pitting edema of the legs/feet, DP/PT equal b/l  Skin: warm, well perfused  Neurologic: nonfocal, AAOx3  Psych: normal mood and affect     LINES: peripherial     HOSPITAL MEDICATIONS:  Standing Meds:  aspirin enteric coated 81 milliGRAM(s) Oral daily  atorvastatin 20 milliGRAM(s) Oral at bedtime  cinacalcet 60 milliGRAM(s) Oral daily  clopidogrel Tablet 75 milliGRAM(s) Oral daily  dextrose 10%. 1000 milliLiter(s) IV Continuous <Continuous>  dextrose 5%. 1000 milliLiter(s) IV Continuous <Continuous>  dextrose 50% Injectable 50 milliLiter(s) IV Push every 15 minutes  dextrose 50% Injectable 25 milliLiter(s) IV Push every 15 minutes  DULoxetine 60 milliGRAM(s) Oral daily  heparin  Injectable 5000 Unit(s) SubCutaneous every 8 hours  hydrALAZINE 100 milliGRAM(s) Oral every 8 hours  insulin Infusion 1 Unit(s)/Hr IV Continuous <Continuous>  metoprolol succinate ER 50 milliGRAM(s) Oral daily  metroNIDAZOLE  IVPB 500 milliGRAM(s) IV Intermittent every 8 hours  multivitamin 1 Tablet(s) Oral daily  piperacillin/tazobactam IVPB. 3.375 Gram(s) IV Intermittent every 12 hours  sevelamer hydrochloride 800 milliGRAM(s) Oral three times a day with meals      PRN Meds:  acetaminophen   Tablet .. 650 milliGRAM(s) Oral every 6 hours PRN  dextrose 40% Gel 15 Gram(s) Oral once PRN  glucagon  Injectable 1 milliGRAM(s) IntraMuscular once PRN  ondansetron Injectable 8 milliGRAM(s) IV Push every 8 hours PRN      LABS:                        9.9    7.2   )-----------( 239      ( 18 Dec 2018 01:14 )             29.9     Hgb Trend: 9.9<--, 9.5<--, 10.8<--  12-18    135  |  92<L>  |  13  ----------------------------<  163<H>  4.2   |  25  |  2.63<H>    Ca    7.9<L>      18 Dec 2018 05:03  Phos  3.4     12-18  Mg     1.9     12-18    TPro  6.2  /  Alb  3.9  /  TBili  0.2  /  DBili  x   /  AST  72<H>  /  ALT  35  /  AlkPhos  87  12-17    Creatinine Trend: 2.63<--, 2.30<--, 6.25<--, 5.94<--, 2.18<--, 3.43<--  PT/INR - ( 18 Dec 2018 01:14 )   PT: 11.8 sec;   INR: 1.03 ratio         PTT - ( 18 Dec 2018 01:14 )  PTT:25.9 sec      Venous Blood Gas:  12-18 @ 04:56  7.46/43/51/31/88  VBG Lactate: 1.4  Venous Blood Gas:  12-18 @ 00:59  7.45/44/45/30/81  VBG Lactate: 1.6  Venous Blood Gas:  12-17 @ 19:27  --/--/--/--/--  VBG Lactate: 3.5  Venous Blood Gas:  12-17 @ 15:14  7.29/45/36/21/54  VBG Lactate: 7.4      MICROBIOLOGY:     RADIOLOGY:  [ ] Reviewed and interpreted by me    EKG: MICU PROGRESS NOTE    Joesph Dietz MD  PGY-1  Extension [phone]    CHIEF COMPLAINT: DKA    Interval events / Subjective:   HD last night. 1x nbnb emesis, no other acute events. mental status improved, anion gap closing at 19, total 16U insulin, still on 1U/hr. glucose tending down to 150s, electrolytes stable. Pt has no complaints besides wanting to get her medtronic pump.     REVIEW OF SYSTEMS:  Constitutional: [ ] negative [ ] fevers [ ] chills [ ] weight loss [ ] weight gain  HEENT: [ ] negative [ ] dry eyes [ ] eye irritation [ ] postnasal drip [ ] nasal congestion  CV: [ ] negative  [ ] chest pain [ ] orthopnea [ ] palpitations [ ] murmur  Resp: [ ] negative [ ] cough [ ] shortness of breath [ ] dyspnea [ ] wheezing [ ] sputum [ ] hemoptysis  GI: [ ] negative [ ] nausea [ ] vomiting [ ] diarrhea [ ] constipation [ ] abd pain [ ] dysphagia   : [ ] negative [ ] dysuria [ ] nocturia [ ] hematuria [ ] increased urinary frequency  Musculoskeletal: [ ] negative [ ] back pain [ ] myalgias [ ] arthralgias [ ] fracture  Skin: [ ] negative [ ] rash [ ] itch  Neurological: [ ] negative [ ] headache [ ] dizziness [ ] syncope [ ] weakness [ ] numbness  Psychiatric: [ ] negative [ ] anxiety [ ] depression  Endocrine: [ ] negative [ ] diabetes [ ] thyroid problem  Hematologic/Lymphatic: [ ] negative [ ] anemia [ ] bleeding problem  Allergic/Immunologic: [ ] negative [ ] itchy eyes [ ] nasal discharge [ ] hives [ ] angioedema  [ ] All other systems negative  [ ] Unable to assess ROS because ________    OBJECTIVE:  ICU Vital Signs Last 24 Hrs  T(C): 37.2 (18 Dec 2018 00:00), Max: 37.3 (17 Dec 2018 22:48)  T(F): 98.9 (18 Dec 2018 00:00), Max: 99.1 (17 Dec 2018 22:48)  HR: 84 (18 Dec 2018 04:00) (75 - 84)  BP: 178/81 (18 Dec 2018 04:00) (135/62 - 196/84)  BP(mean): 116 (18 Dec 2018 04:00) (91 - 120)  ABP: --  ABP(mean): --  RR: 13 (18 Dec 2018 04:00) (13 - 23)  SpO2: 97% (18 Dec 2018 04:00) (94% - 100%)        12-17 @ 07:01  -  12-18 @ 06:30  --------------------------------------------------------  IN: 1396 mL / OUT: 500 mL / NET: 896 mL      CAPILLARY BLOOD GLUCOSE      POCT Blood Glucose.: 157 mg/dL (18 Dec 2018 06:05)      PHYSICAL EXAM:  General: NAD, good hygiene, well developed  HENT: Atraumatic, PERRLA, no conjunctivae injection  Neck: normal ROM and trachea midline   Cardiovascular: RRR, S1&2, no M or R, radial pulses equal and b/l  Respiratory: LLL crackles, decreased breath sounds on right   Abdominal:  soft and non-tender non distended, neg CVA tenderness   Extremities: +1 pitting edema of the legs/feet, DP/PT equal b/l  Skin: warm, well perfused  Neurologic: nonfocal, AAOx3  Psych: normal mood and affect     LINES: peripherial     HOSPITAL MEDICATIONS:  Standing Meds:  aspirin enteric coated 81 milliGRAM(s) Oral daily  atorvastatin 20 milliGRAM(s) Oral at bedtime  cinacalcet 60 milliGRAM(s) Oral daily  clopidogrel Tablet 75 milliGRAM(s) Oral daily  dextrose 10%. 1000 milliLiter(s) IV Continuous <Continuous>  dextrose 5%. 1000 milliLiter(s) IV Continuous <Continuous>  dextrose 50% Injectable 50 milliLiter(s) IV Push every 15 minutes  dextrose 50% Injectable 25 milliLiter(s) IV Push every 15 minutes  DULoxetine 60 milliGRAM(s) Oral daily  heparin  Injectable 5000 Unit(s) SubCutaneous every 8 hours  hydrALAZINE 100 milliGRAM(s) Oral every 8 hours  insulin Infusion 1 Unit(s)/Hr IV Continuous <Continuous>  metoprolol succinate ER 50 milliGRAM(s) Oral daily  metroNIDAZOLE  IVPB 500 milliGRAM(s) IV Intermittent every 8 hours  multivitamin 1 Tablet(s) Oral daily  piperacillin/tazobactam IVPB. 3.375 Gram(s) IV Intermittent every 12 hours  sevelamer hydrochloride 800 milliGRAM(s) Oral three times a day with meals      PRN Meds:  acetaminophen   Tablet .. 650 milliGRAM(s) Oral every 6 hours PRN  dextrose 40% Gel 15 Gram(s) Oral once PRN  glucagon  Injectable 1 milliGRAM(s) IntraMuscular once PRN  ondansetron Injectable 8 milliGRAM(s) IV Push every 8 hours PRN      LABS:                        9.9    7.2   )-----------( 239      ( 18 Dec 2018 01:14 )             29.9     Hgb Trend: 9.9<--, 9.5<--, 10.8<--  12-18    135  |  92<L>  |  13  ----------------------------<  163<H>  4.2   |  25  |  2.63<H>    Ca    7.9<L>      18 Dec 2018 05:03  Phos  3.4     12-18  Mg     1.9     12-18    TPro  6.2  /  Alb  3.9  /  TBili  0.2  /  DBili  x   /  AST  72<H>  /  ALT  35  /  AlkPhos  87  12-17    Creatinine Trend: 2.63<--, 2.30<--, 6.25<--, 5.94<--, 2.18<--, 3.43<--  PT/INR - ( 18 Dec 2018 01:14 )   PT: 11.8 sec;   INR: 1.03 ratio         PTT - ( 18 Dec 2018 01:14 )  PTT:25.9 sec      Venous Blood Gas:  12-18 @ 04:56  7.46/43/51/31/88  VBG Lactate: 1.4  Venous Blood Gas:  12-18 @ 00:59  7.45/44/45/30/81  VBG Lactate: 1.6  Venous Blood Gas:  12-17 @ 19:27  --/--/--/--/--  VBG Lactate: 3.5  Venous Blood Gas:  12-17 @ 15:14  7.29/45/36/21/54  VBG Lactate: 7.4      MICROBIOLOGY:     RADIOLOGY:  [ ] Reviewed and interpreted by me    EKG:

## 2018-12-18 NOTE — CONSULT NOTE ADULT - PROBLEM SELECTOR RECOMMENDATION 2
- DKA now resolved  - due to cognitive decline, patient is not a candidate for an insulin pump, therefore will maintain her on basal bolus insulin while inpatient. She is also not a good candidate for basal bolus insulin as outpatient due to cognitive decline. Thus, management of this patient is very challenging. It would be best if patient can obtain nursing services at home to ensure that she takes her insulin as required versus living in a facility. Otherwise, she will continue to have frequent admissions for DKA. Would obtain SW involvement. Her  is also well known to the service and does not appear to be actively involved in her care at this point in time  - s/p NPH 4 units x 1. Recommend Lantus 9 units tonight at 11 pm and Humalog 4 units TID with low correction scale qac and qhs  - for discharge: recommend SW involvement, plan to be determined.

## 2018-12-18 NOTE — CONSULT NOTE ADULT - SUBJECTIVE AND OBJECTIVE BOX
HPI:  63 year old woman with PMH uncontrolled DM1 with all known microvascular complications, CAD, CHF (EF 50% 10/2018), TIA, PVD, ESRD HD MTThSat, last HD Sat, presenting w/nausea, vomiting, diarrhea, AMS, found to be in DKA. As per pt and chart notes, pt had Chinese food with her family yesterday, and the entire family then began experiencing dull abdominal pain and diarrhea. Pt endorses dull lower abdominal pain, NBNB vomiting x1, and diarrhea x1. As per ED, pt was found minimally responsive, obtunded on couch, FS showing very high BG (no number given) so family brought her in. Noted to have FS > 600 on admission, anion gap 31 with bicarb of 17, BHB 3.1 and lactate of 7.4.  Admitted to MICU on an insulin gtt.     In the ED, pt vitals were:  T=36.9 HR=78 EF=262/75 C9KGF=502% RA RR=22  Labs notable for leukocytosis (neutrophil predominant) to 12, hyponatremia 132, K+ 6, creatinine 5.94, FS >600, AG 31, transaminitis (108/47/104), beta-hydroxy butyrate 3.1, phos 4.6, VBG 7.29/45/36/21. There, pt was administered 250ML bolus NS and started on insulin gtt at 5. CXR and CT A/P also ordered. MICU consulted for DKA. (17 Dec 2018 16:29)      Endocrine History:  Patient is well known to our service. She follows with her endocrinologist, Dr. Pina Ley. Last visit with endo was last month. HbAqc is 8.6%. She has had DM1 for 44 years. Known complications include ESRD on HD, neuropathy, and retinopathy. She also has macrovascular complications which include CAD. She uses a medtronic insulin pump. Does not have her insulin pump here in the hospital. See settings below that were listed on last consult note. Of note, on previous hospitalizations, she was noted to not change her pump site every 3 days as she should. She often leaves the insulin pump site on until her insulin runs out. She states that on Friday, her insulin ran out. She kept calling her doctor but could not get in touch with her. She states that she has regular insulin at home and injected 20 units on Friday. Then injected 20 units of Regular Insulin x 2 on Saturday and again on Sunday. Notes that her Bg values increased to the 200s on Saturday, then into the 300's on Sunday.       Pump Settings (obtained from prior consult note):  Basal:  12am 0.275  5am 0.375    I:C 15    Sensitivity:  12AM 60  7AM 40  6PM 60    Target   12am 110-130  8am 100-120      PAST MEDICAL & SURGICAL HISTORY:  CHF (congestive heart failure): EF 40-45%  Subclavian vein stenosis, left: s/p stent  DKA, type 1: 1/2015  ACS (acute coronary syndrome): 1/2015 - cath revealed 100% ostial stenosis not amenable to PCI - medical management  TIA (transient ischemic attack): x 2 - 8-9 years ago prior to ASD/VSD repair  CAD (coronary artery disease): s/p stents  Gout: past  CVA (cerebral infarction): with no residual, 8 yrs ago, prior to heart surgery - ST memory loss  Peripheral vascular disease: occluded left fem-pop bypass 5/2015  Diabetes mellitus type 1: Insulin Dependent - Medtronic  Minimed Paradigm Insulin Pump - Novolog  ESRD (end stage renal disease): dialysis  M, tue, thursday, saturday  Hyperlipidemia  Status post device closure of ASD: &quot;bren[place]&quot;  History of cardiac catheterization: 1/2015 - no intervention  S/P femoral-popliteal bypass surgery: L and R in 2013 with graft; 5/2015 CFA angioplasty left and ileofemoral endarterectomywith vein patch angioplasty of left fem-pop bypass graft  Multiple vascular surgery both leg, left fempop bypass revision 11/2015  AV (arteriovenous fistula): Left AV graft; revision with stent placement 2-3 years ago  S/P cholecystectomy      FAMILY HISTORY:  Family history of smoking  Family history of hypertension  Family history of cancer (Sibling)  no family history of DM2    Social History:  Former smoker, quit 18 years ago  No alcohol use  Lives wit     Outpatient Medications:  · 	HumaLOG 100 units/mL injectable solution: via insulin pump  · 	atorvastatin 20 mg oral tablet: 1 tab(s) orally once a day (at bedtime)  · 	aspirin 81 mg oral delayed release tablet: 1 tab(s) orally once a day  · 	Multiple Vitamins oral tablet: 1 tab(s) orally once a day  · 	oxyCODONE-acetaminophen 5 mg-325 mg oral tablet: 1 tab(s) orally 1 to 2 times a day, As Needed  · 	lisinopril 40 mg oral tablet: 1 tab(s) orally once a day  · 	clopidogrel 75 mg oral tablet: 1 tab(s) orally once a day  · 	Metoprolol Succinate ER 50 mg oral tablet, extended release: 1 tab(s) orally once a day  · 	DULoxetine 60 mg oral delayed release capsule: 1 cap(s) orally once a day  · 	Sensipar 60 mg oral tablet: 1 tab(s) orally once a day  · 	Renvela 800 mg oral tablet: 3 tab(s) orally  (with meals)  · 	hydrALAZINE 25 mg oral tablet: 4 tab(s) orally every 8 hours    MEDICATIONS  (STANDING):  aspirin enteric coated 81 milliGRAM(s) Oral daily  atorvastatin 20 milliGRAM(s) Oral at bedtime  cinacalcet 60 milliGRAM(s) Oral daily  clopidogrel Tablet 75 milliGRAM(s) Oral daily  dextrose 10%. 1000 milliLiter(s) (50 mL/Hr) IV Continuous <Continuous>  dextrose 5%. 1000 milliLiter(s) (50 mL/Hr) IV Continuous <Continuous>  dextrose 50% Injectable 50 milliLiter(s) IV Push every 15 minutes  dextrose 50% Injectable 25 milliLiter(s) IV Push every 15 minutes  DULoxetine 60 milliGRAM(s) Oral daily  heparin  Injectable 5000 Unit(s) SubCutaneous every 8 hours  hydrALAZINE 100 milliGRAM(s) Oral every 8 hours  insulin glargine Injectable (LANTUS) 9 Unit(s) SubCutaneous <User Schedule>  insulin Infusion 1 Unit(s)/Hr (1 mL/Hr) IV Continuous <Continuous>  insulin lispro (HumaLOG) corrective regimen sliding scale   SubCutaneous three times a day before meals  insulin lispro (HumaLOG) corrective regimen sliding scale   SubCutaneous at bedtime  insulin lispro Injectable (HumaLOG) 4 Unit(s) SubCutaneous three times a day before meals  metoprolol succinate ER 50 milliGRAM(s) Oral daily  multivitamin 1 Tablet(s) Oral daily  sevelamer hydrochloride 800 milliGRAM(s) Oral three times a day with meals    MEDICATIONS  (PRN):  acetaminophen   Tablet .. 650 milliGRAM(s) Oral every 6 hours PRN Mild Pain (1 - 3), Moderate Pain (4 - 6)  dextrose 40% Gel 15 Gram(s) Oral once PRN Blood Glucose LESS THAN 70 milliGRAM(s)/deciliter  glucagon  Injectable 1 milliGRAM(s) IntraMuscular once PRN Glucose LESS THAN 70 milligrams/deciliter  ondansetron Injectable 8 milliGRAM(s) IV Push every 8 hours PRN Nausea and/or Vomiting      Allergies  No Known Allergies    Review of Systems:  Constitutional: No fever  Eyes: No blurry vision  Neuro: No tremors  HEENT: No pain  Cardiovascular: No chest pain, palpitations  Respiratory: No SOB, no cough  GI: No nausea, vomiting, abdominal pain  : No dysuria  Skin: no rash  Psych: no depression  Endocrine: no polyuria, polydipsia  Hem/lymph: no swelling  Osteoporosis: no fractures    ALL OTHER SYSTEMS REVIEWED AND NEGATIVE    PHYSICAL EXAM:  VITALS: T(C): 36.6 (12-18-18 @ 12:20)  T(F): 97.8 (12-18-18 @ 12:20), Max: 99.9 (12-18-18 @ 04:00)  HR: 76 (12-18-18 @ 12:20) (75 - 89)  BP: 136/58 (12-18-18 @ 12:20) (134/65 - 196/84)  RR:  (13 - 23)  SpO2:  (94% - 100%)  Wt(kg): --  GENERAL: NAD, well-groomed, well-developed  EYES: No proptosis, no lid lag, anicteric  HEENT:  Atraumatic, Normocephalic, moist mucous membranes  THYROID: Normal size, no palpable nodules  RESPIRATORY: Clear to auscultation bilaterally; No rales, rhonchi, wheezing  CARDIOVASCULAR: Regular rate and rhythm; No murmurs; no peripheral edema  GI: Soft, nontender, non distended, normal bowel sounds  SKIN: Dry, intact, No rashes or lesions  MUSCULOSKELETAL: Full range of motion, normal strength  NEURO: sensation intact, extraocular movements intact, no tremor  PSYCH: Alert and oriented x 3, normal affect, normal mood  CUSHING'S SIGNS: no striae      POCT Blood Glucose.: 157 mg/dL (12-18-18 @ 12:24) NPH 4  POCT Blood Glucose.: 149 mg/dL (12-18-18 @ 11:16)  POCT Blood Glucose.: 156 mg/dL (12-18-18 @ 10:09)  POCT Blood Glucose.: 454 mg/dL (12-18-18 @ 10:04)  POCT Blood Glucose.: 162 mg/dL (12-18-18 @ 09:09)  POCT Blood Glucose.: 144 mg/dL (12-18-18 @ 07:57)  POCT Blood Glucose.: 151 mg/dL (12-18-18 @ 06:54)  POCT Blood Glucose.: 157 mg/dL (12-18-18 @ 06:05)  POCT Blood Glucose.: 153 mg/dL (12-18-18 @ 04:09)  POCT Blood Glucose.: 157 mg/dL (12-18-18 @ 02:59)  POCT Blood Glucose.: 168 mg/dL (12-18-18 @ 02:08)  POCT Blood Glucose.: 201 mg/dL (12-18-18 @ 00:48)  POCT Blood Glucose.: 196 mg/dL (12-18-18 @ 00:01)  POCT Blood Glucose.: 170 mg/dL (12-17-18 @ 23:05)  POCT Blood Glucose.: 134 mg/dL (12-17-18 @ 22:36)  POCT Blood Glucose.: 133 mg/dL (12-17-18 @ 21:58)  POCT Blood Glucose.: 156 mg/dL (12-17-18 @ 21:01)  POCT Blood Glucose.: 157 mg/dL (12-17-18 @ 20:59)  POCT Blood Glucose.: 339 mg/dL (12-17-18 @ 19:55)  POCT Blood Glucose.: 299 mg/dL (12-17-18 @ 19:50)  POCT Blood Glucose.: 525 mg/dL (12-17-18 @ 18:46)  POCT Blood Glucose.: 565 mg/dL (12-17-18 @ 18:26)  POCT Blood Glucose.: 531 mg/dL (12-17-18 @ 18:25)  POCT Blood Glucose.: 569 mg/dL (12-17-18 @ 17:45)  POCT Blood Glucose.: >600 mg/dL (12-17-18 @ 17:45)  POCT Blood Glucose.: >600 mg/dL (12-17-18 @ 14:45)  POCT Blood Glucose.: >600 mg/dL (12-17-18 @ 14:44)                          9.9    7.2   )-----------( 239      ( 18 Dec 2018 01:14 )             29.9       12-18    129<L>  |  88<L>  |  13  ----------------------------<  477<HH>  4.7   |  27  |  2.88<H>    EGFR if : 20<L>  EGFR if non : 17<L>    Ca    7.9<L>      12-18  Mg     1.8     12-18  Phos  4.1     12-18    TPro  6.2  /  Alb  3.9  /  TBili  0.2  /  DBili  x   /  AST  72<H>  /  ALT  35  /  AlkPhos  87  12-17    Hemoglobin A1C, Whole Blood: 8.6 % <H> [4.0 - 5.6] (11-16-18 @ 20:14)  Hemoglobin A1C, Whole Blood: 9.1 % <H> [4.0 - 5.6] (10-16-18 @ 08:13) HPI:  63 year old woman with PMH uncontrolled DM1 since age 21 with all known microvascular complications, CAD, CHF (EF 50% 10/2018), TIA, PVD, ESRD HD MTThSat, last HD Sat, presenting w/nausea, vomiting, diarrhea, AMS, found to be in DKA. As per pt and chart notes, pt had Chinese food with her family yesterday, and the entire family then began experiencing dull abdominal pain and diarrhea. Pt endorses dull lower abdominal pain, NBNB vomiting x1, and diarrhea x1. As per ED, pt was found minimally responsive, obtunded on couch, FS showing very high BG (no number given) so family brought her in. Noted to have FS > 600 on admission, anion gap 31 with bicarb of 17, BHB 3.1 and lactate of 7.4.  Admitted to MICU on an insulin gtt.     In the ED, pt vitals were:  T=36.9 HR=78 MU=165/75 A5IXI=665% RA RR=22  Labs notable for leukocytosis (neutrophil predominant) to 12, hyponatremia 132, K+ 6, creatinine 5.94, FS >600, AG 31, transaminitis (108/47/104), beta-hydroxy butyrate 3.1, phos 4.6, VBG 7.29/45/36/21. There, pt was administered 250ML bolus NS and started on insulin gtt at 5. CXR and CT A/P also ordered. MICU consulted for DKA. (17 Dec 2018 16:29)      Endocrine History:  Patient is well known to our service. She follows with her endocrinologist, Dr. Pina Ley. Last visit with endo was last month. HbA1c is 8.6%. She has had DM1 for 44 years. Known complications include ESRD on HD, neuropathy, and retinopathy. She also has macrovascular complications which include CAD. She uses a medtronic insulin pump. Does not have her insulin pump here in the hospital. See settings below that were listed on last consult note. Of note, on previous hospitalizations, she was noted to not change her pump site every 3 days as she should. She often leaves the insulin pump site on until her insulin runs out. She states that on Friday, her insulin ran out. She kept calling her doctor but could not get in touch with her. She states that she has regular insulin at home and injected 20 units on Friday. Then injected 20 units of Regular Insulin x 2 on Saturday and again on Sunday. Notes that her Bg values increased to the 200s on Saturday, then into the 300's on Sunday.       Pump Settings (obtained from prior consult note):  Basal:  12am 0.275  5am 0.375    I:C 15    Sensitivity:  12AM 60  7AM 40  6PM 60    Target   12am 110-130  8am 100-120      PAST MEDICAL & SURGICAL HISTORY:  CHF (congestive heart failure): EF 40-45%  Subclavian vein stenosis, left: s/p stent  DKA, type 1: 1/2015  ACS (acute coronary syndrome): 1/2015 - cath revealed 100% ostial stenosis not amenable to PCI - medical management  TIA (transient ischemic attack): x 2 - 8-9 years ago prior to ASD/VSD repair  CAD (coronary artery disease): s/p stents  Gout: past  CVA (cerebral infarction): with no residual, 8 yrs ago, prior to heart surgery - ST memory loss  Peripheral vascular disease: occluded left fem-pop bypass 5/2015  Diabetes mellitus type 1: Insulin Dependent - Medtronic  Minimed Paradigm Insulin Pump - Novolog  ESRD (end stage renal disease): dialysis  M, tue, thursday, saturday  Hyperlipidemia  Status post device closure of ASD: &quot;bren[place]&quot;  History of cardiac catheterization: 1/2015 - no intervention  S/P femoral-popliteal bypass surgery: L and R in 2013 with graft; 5/2015 CFA angioplasty left and ileofemoral endarterectomywith vein patch angioplasty of left fem-pop bypass graft  Multiple vascular surgery both leg, left fempop bypass revision 11/2015  AV (arteriovenous fistula): Left AV graft; revision with stent placement 2-3 years ago  S/P cholecystectomy      FAMILY HISTORY:  Family history of smoking  Family history of hypertension  Family history of cancer (Sibling)  no family history of DM2    Social History:  Former smoker, quit 18 years ago  No alcohol use  Lives wit     Outpatient Medications:  · 	HumaLOG 100 units/mL injectable solution: via insulin pump  · 	atorvastatin 20 mg oral tablet: 1 tab(s) orally once a day (at bedtime)  · 	aspirin 81 mg oral delayed release tablet: 1 tab(s) orally once a day  · 	Multiple Vitamins oral tablet: 1 tab(s) orally once a day  · 	oxyCODONE-acetaminophen 5 mg-325 mg oral tablet: 1 tab(s) orally 1 to 2 times a day, As Needed  · 	lisinopril 40 mg oral tablet: 1 tab(s) orally once a day  · 	clopidogrel 75 mg oral tablet: 1 tab(s) orally once a day  · 	Metoprolol Succinate ER 50 mg oral tablet, extended release: 1 tab(s) orally once a day  · 	DULoxetine 60 mg oral delayed release capsule: 1 cap(s) orally once a day  · 	Sensipar 60 mg oral tablet: 1 tab(s) orally once a day  · 	Renvela 800 mg oral tablet: 3 tab(s) orally  (with meals)  · 	hydrALAZINE 25 mg oral tablet: 4 tab(s) orally every 8 hours    MEDICATIONS  (STANDING):  aspirin enteric coated 81 milliGRAM(s) Oral daily  atorvastatin 20 milliGRAM(s) Oral at bedtime  cinacalcet 60 milliGRAM(s) Oral daily  clopidogrel Tablet 75 milliGRAM(s) Oral daily  dextrose 10%. 1000 milliLiter(s) (50 mL/Hr) IV Continuous <Continuous>  dextrose 5%. 1000 milliLiter(s) (50 mL/Hr) IV Continuous <Continuous>  dextrose 50% Injectable 50 milliLiter(s) IV Push every 15 minutes  dextrose 50% Injectable 25 milliLiter(s) IV Push every 15 minutes  DULoxetine 60 milliGRAM(s) Oral daily  heparin  Injectable 5000 Unit(s) SubCutaneous every 8 hours  hydrALAZINE 100 milliGRAM(s) Oral every 8 hours  insulin glargine Injectable (LANTUS) 9 Unit(s) SubCutaneous <User Schedule>  insulin Infusion 1 Unit(s)/Hr (1 mL/Hr) IV Continuous <Continuous>  insulin lispro (HumaLOG) corrective regimen sliding scale   SubCutaneous three times a day before meals  insulin lispro (HumaLOG) corrective regimen sliding scale   SubCutaneous at bedtime  insulin lispro Injectable (HumaLOG) 4 Unit(s) SubCutaneous three times a day before meals  metoprolol succinate ER 50 milliGRAM(s) Oral daily  multivitamin 1 Tablet(s) Oral daily  sevelamer hydrochloride 800 milliGRAM(s) Oral three times a day with meals    MEDICATIONS  (PRN):  acetaminophen   Tablet .. 650 milliGRAM(s) Oral every 6 hours PRN Mild Pain (1 - 3), Moderate Pain (4 - 6)  dextrose 40% Gel 15 Gram(s) Oral once PRN Blood Glucose LESS THAN 70 milliGRAM(s)/deciliter  glucagon  Injectable 1 milliGRAM(s) IntraMuscular once PRN Glucose LESS THAN 70 milligrams/deciliter  ondansetron Injectable 8 milliGRAM(s) IV Push every 8 hours PRN Nausea and/or Vomiting      Allergies  No Known Allergies    Review of Systems:  Constitutional: No fever  Eyes: No blurry vision  Neuro: No tremors  HEENT: No pain  Cardiovascular: No chest pain, palpitations  Respiratory: No SOB, no cough  GI: No nausea, vomiting, abdominal pain  : No dysuria  Skin: no rash  Psych: no depression  Endocrine: no polyuria, polydipsia  Hem/lymph: no swelling  Osteoporosis: no fractures    ALL OTHER SYSTEMS REVIEWED AND NEGATIVE    PHYSICAL EXAM:  VITALS: T(C): 36.6 (12-18-18 @ 12:20)  T(F): 97.8 (12-18-18 @ 12:20), Max: 99.9 (12-18-18 @ 04:00)  HR: 76 (12-18-18 @ 12:20) (75 - 89)  BP: 136/58 (12-18-18 @ 12:20) (134/65 - 196/84)  RR:  (13 - 23)  SpO2:  (94% - 100%)  Wt(kg): --  GENERAL: NAD, well-groomed, well-developed  EYES: No proptosis, no lid lag, anicteric  HEENT:  Atraumatic, Normocephalic, moist mucous membranes  THYROID: Normal size, no palpable nodules  RESPIRATORY: Clear to auscultation bilaterally; No rales, rhonchi, wheezing  CARDIOVASCULAR: Regular rate and rhythm; No murmurs; no peripheral edema  GI: Soft, nontender, non distended, normal bowel sounds  SKIN: Dry, intact, No rashes or lesions  MUSCULOSKELETAL: Full range of motion, normal strength  NEURO: sensation intact, extraocular movements intact, no tremor  PSYCH: Alert and oriented x 3, +reactive affect, +euthymic mood        POCT Blood Glucose.: 157 mg/dL (12-18-18 @ 12:24) NPH 4  POCT Blood Glucose.: 149 mg/dL (12-18-18 @ 11:16)  POCT Blood Glucose.: 156 mg/dL (12-18-18 @ 10:09)  POCT Blood Glucose.: 454 mg/dL (12-18-18 @ 10:04)  POCT Blood Glucose.: 162 mg/dL (12-18-18 @ 09:09)  POCT Blood Glucose.: 144 mg/dL (12-18-18 @ 07:57)  POCT Blood Glucose.: 151 mg/dL (12-18-18 @ 06:54)  POCT Blood Glucose.: 157 mg/dL (12-18-18 @ 06:05)  POCT Blood Glucose.: 153 mg/dL (12-18-18 @ 04:09)  POCT Blood Glucose.: 157 mg/dL (12-18-18 @ 02:59)  POCT Blood Glucose.: 168 mg/dL (12-18-18 @ 02:08)  POCT Blood Glucose.: 201 mg/dL (12-18-18 @ 00:48)  POCT Blood Glucose.: 196 mg/dL (12-18-18 @ 00:01)  POCT Blood Glucose.: 170 mg/dL (12-17-18 @ 23:05)  POCT Blood Glucose.: 134 mg/dL (12-17-18 @ 22:36)  POCT Blood Glucose.: 133 mg/dL (12-17-18 @ 21:58)  POCT Blood Glucose.: 156 mg/dL (12-17-18 @ 21:01)  POCT Blood Glucose.: 157 mg/dL (12-17-18 @ 20:59)  POCT Blood Glucose.: 339 mg/dL (12-17-18 @ 19:55)  POCT Blood Glucose.: 299 mg/dL (12-17-18 @ 19:50)  POCT Blood Glucose.: 525 mg/dL (12-17-18 @ 18:46)  POCT Blood Glucose.: 565 mg/dL (12-17-18 @ 18:26)  POCT Blood Glucose.: 531 mg/dL (12-17-18 @ 18:25)  POCT Blood Glucose.: 569 mg/dL (12-17-18 @ 17:45)  POCT Blood Glucose.: >600 mg/dL (12-17-18 @ 17:45)  POCT Blood Glucose.: >600 mg/dL (12-17-18 @ 14:45)  POCT Blood Glucose.: >600 mg/dL (12-17-18 @ 14:44)                          9.9    7.2   )-----------( 239      ( 18 Dec 2018 01:14 )             29.9       12-18    129<L>  |  88<L>  |  13  ----------------------------<  477<HH>  4.7   |  27  |  2.88<H>    EGFR if : 20<L>  EGFR if non : 17<L>    Ca    7.9<L>      12-18  Mg     1.8     12-18  Phos  4.1     12-18    TPro  6.2  /  Alb  3.9  /  TBili  0.2  /  DBili  x   /  AST  72<H>  /  ALT  35  /  AlkPhos  87  12-17    Hemoglobin A1C, Whole Blood: 8.6 % <H> [4.0 - 5.6] (11-16-18 @ 20:14)  Hemoglobin A1C, Whole Blood: 9.1 % <H> [4.0 - 5.6] (10-16-18 @ 08:13)

## 2018-12-19 LAB
ALBUMIN SERPL ELPH-MCNC: 3.6 G/DL — SIGNIFICANT CHANGE UP (ref 3.3–5)
ALP SERPL-CCNC: 75 U/L — SIGNIFICANT CHANGE UP (ref 40–120)
ALT FLD-CCNC: 40 U/L — SIGNIFICANT CHANGE UP (ref 10–45)
ANION GAP SERPL CALC-SCNC: 15 MMOL/L — SIGNIFICANT CHANGE UP (ref 5–17)
ANION GAP SERPL CALC-SCNC: 18 MMOL/L — HIGH (ref 5–17)
AST SERPL-CCNC: 62 U/L — HIGH (ref 10–40)
BASE EXCESS BLDV CALC-SCNC: 5.6 MMOL/L — HIGH (ref -2–2)
BASOPHILS # BLD AUTO: 0 K/UL — SIGNIFICANT CHANGE UP (ref 0–0.2)
BASOPHILS # BLD AUTO: 0 K/UL — SIGNIFICANT CHANGE UP (ref 0–0.2)
BASOPHILS NFR BLD AUTO: 0.2 % — SIGNIFICANT CHANGE UP (ref 0–2)
BASOPHILS NFR BLD AUTO: 0.4 % — SIGNIFICANT CHANGE UP (ref 0–2)
BILIRUB SERPL-MCNC: 0.2 MG/DL — SIGNIFICANT CHANGE UP (ref 0.2–1.2)
BUN SERPL-MCNC: 7 MG/DL — SIGNIFICANT CHANGE UP (ref 7–23)
BUN SERPL-MCNC: 8 MG/DL — SIGNIFICANT CHANGE UP (ref 7–23)
CA-I SERPL-SCNC: 1.03 MMOL/L — LOW (ref 1.12–1.3)
CALCIUM SERPL-MCNC: 7.6 MG/DL — LOW (ref 8.4–10.5)
CALCIUM SERPL-MCNC: 7.7 MG/DL — LOW (ref 8.4–10.5)
CHLORIDE BLDV-SCNC: 97 MMOL/L — SIGNIFICANT CHANGE UP (ref 96–108)
CHLORIDE SERPL-SCNC: 93 MMOL/L — LOW (ref 96–108)
CHLORIDE SERPL-SCNC: 95 MMOL/L — LOW (ref 96–108)
CO2 BLDV-SCNC: 32 MMOL/L — HIGH (ref 22–30)
CO2 SERPL-SCNC: 24 MMOL/L — SIGNIFICANT CHANGE UP (ref 22–31)
CO2 SERPL-SCNC: 24 MMOL/L — SIGNIFICANT CHANGE UP (ref 22–31)
CREAT SERPL-MCNC: 2.16 MG/DL — HIGH (ref 0.5–1.3)
CREAT SERPL-MCNC: 2.43 MG/DL — HIGH (ref 0.5–1.3)
EOSINOPHIL # BLD AUTO: 0 K/UL — SIGNIFICANT CHANGE UP (ref 0–0.5)
EOSINOPHIL # BLD AUTO: 0.1 K/UL — SIGNIFICANT CHANGE UP (ref 0–0.5)
EOSINOPHIL NFR BLD AUTO: 0 % — SIGNIFICANT CHANGE UP (ref 0–6)
EOSINOPHIL NFR BLD AUTO: 0.8 % — SIGNIFICANT CHANGE UP (ref 0–6)
GAS PNL BLDV: 132 MMOL/L — LOW (ref 136–145)
GAS PNL BLDV: SIGNIFICANT CHANGE UP
GAS PNL BLDV: SIGNIFICANT CHANGE UP
GLUCOSE BLDC GLUCOMTR-MCNC: 135 MG/DL — HIGH (ref 70–99)
GLUCOSE BLDC GLUCOMTR-MCNC: 145 MG/DL — HIGH (ref 70–99)
GLUCOSE BLDC GLUCOMTR-MCNC: 152 MG/DL — HIGH (ref 70–99)
GLUCOSE BLDC GLUCOMTR-MCNC: 156 MG/DL — HIGH (ref 70–99)
GLUCOSE BLDC GLUCOMTR-MCNC: 166 MG/DL — HIGH (ref 70–99)
GLUCOSE BLDC GLUCOMTR-MCNC: 208 MG/DL — HIGH (ref 70–99)
GLUCOSE BLDC GLUCOMTR-MCNC: 224 MG/DL — HIGH (ref 70–99)
GLUCOSE BLDV-MCNC: 119 MG/DL — HIGH (ref 70–99)
GLUCOSE SERPL-MCNC: 124 MG/DL — HIGH (ref 70–99)
GLUCOSE SERPL-MCNC: 255 MG/DL — HIGH (ref 70–99)
HCO3 BLDV-SCNC: 30 MMOL/L — HIGH (ref 21–29)
HCT VFR BLD CALC: 31.1 % — LOW (ref 34.5–45)
HCT VFR BLD CALC: 31.9 % — LOW (ref 34.5–45)
HCT VFR BLDA CALC: 33 % — LOW (ref 39–50)
HGB BLD CALC-MCNC: 10.7 G/DL — LOW (ref 11.5–15.5)
HGB BLD-MCNC: 10.5 G/DL — LOW (ref 11.5–15.5)
HGB BLD-MCNC: 10.8 G/DL — LOW (ref 11.5–15.5)
HOROWITZ INDEX BLDV+IHG-RTO: 21 — SIGNIFICANT CHANGE UP
LACTATE BLDV-MCNC: 1.9 MMOL/L — SIGNIFICANT CHANGE UP (ref 0.7–2)
LYMPHOCYTES # BLD AUTO: 0.8 K/UL — LOW (ref 1–3.3)
LYMPHOCYTES # BLD AUTO: 0.8 K/UL — LOW (ref 1–3.3)
LYMPHOCYTES # BLD AUTO: 12.8 % — LOW (ref 13–44)
LYMPHOCYTES # BLD AUTO: 21.6 % — SIGNIFICANT CHANGE UP (ref 13–44)
MAGNESIUM SERPL-MCNC: 1.9 MG/DL — SIGNIFICANT CHANGE UP (ref 1.6–2.6)
MAGNESIUM SERPL-MCNC: 1.9 MG/DL — SIGNIFICANT CHANGE UP (ref 1.6–2.6)
MCHC RBC-ENTMCNC: 33 GM/DL — SIGNIFICANT CHANGE UP (ref 32–36)
MCHC RBC-ENTMCNC: 34.2 PG — HIGH (ref 27–34)
MCHC RBC-ENTMCNC: 34.8 GM/DL — SIGNIFICANT CHANGE UP (ref 32–36)
MCHC RBC-ENTMCNC: 35.6 PG — HIGH (ref 27–34)
MCV RBC AUTO: 102 FL — HIGH (ref 80–100)
MCV RBC AUTO: 104 FL — HIGH (ref 80–100)
MONOCYTES # BLD AUTO: 0.5 K/UL — SIGNIFICANT CHANGE UP (ref 0–0.9)
MONOCYTES # BLD AUTO: 0.5 K/UL — SIGNIFICANT CHANGE UP (ref 0–0.9)
MONOCYTES NFR BLD AUTO: 12.5 % — SIGNIFICANT CHANGE UP (ref 2–14)
MONOCYTES NFR BLD AUTO: 8.4 % — SIGNIFICANT CHANGE UP (ref 2–14)
NEUTROPHILS # BLD AUTO: 2.5 K/UL — SIGNIFICANT CHANGE UP (ref 1.8–7.4)
NEUTROPHILS # BLD AUTO: 4.8 K/UL — SIGNIFICANT CHANGE UP (ref 1.8–7.4)
NEUTROPHILS NFR BLD AUTO: 65.7 % — SIGNIFICANT CHANGE UP (ref 43–77)
NEUTROPHILS NFR BLD AUTO: 77.5 % — HIGH (ref 43–77)
OTHER CELLS CSF MANUAL: 10 ML/DL — LOW (ref 18–22)
PCO2 BLDV: 48 MMHG — SIGNIFICANT CHANGE UP (ref 35–50)
PH BLDV: 7.42 — SIGNIFICANT CHANGE UP (ref 7.35–7.45)
PHOSPHATE SERPL-MCNC: 2.7 MG/DL — SIGNIFICANT CHANGE UP (ref 2.5–4.5)
PHOSPHATE SERPL-MCNC: 3.2 MG/DL — SIGNIFICANT CHANGE UP (ref 2.5–4.5)
PLATELET # BLD AUTO: 236 K/UL — SIGNIFICANT CHANGE UP (ref 150–400)
PLATELET # BLD AUTO: 250 K/UL — SIGNIFICANT CHANGE UP (ref 150–400)
PO2 BLDV: 40 MMHG — SIGNIFICANT CHANGE UP (ref 25–45)
POTASSIUM BLDV-SCNC: 3.8 MMOL/L — SIGNIFICANT CHANGE UP (ref 3.5–5.3)
POTASSIUM SERPL-MCNC: 4.2 MMOL/L — SIGNIFICANT CHANGE UP (ref 3.5–5.3)
POTASSIUM SERPL-MCNC: 4.5 MMOL/L — SIGNIFICANT CHANGE UP (ref 3.5–5.3)
POTASSIUM SERPL-SCNC: 4.2 MMOL/L — SIGNIFICANT CHANGE UP (ref 3.5–5.3)
POTASSIUM SERPL-SCNC: 4.5 MMOL/L — SIGNIFICANT CHANGE UP (ref 3.5–5.3)
PROT SERPL-MCNC: 5.9 G/DL — LOW (ref 6–8.3)
RBC # BLD: 3.04 M/UL — LOW (ref 3.8–5.2)
RBC # BLD: 3.08 M/UL — LOW (ref 3.8–5.2)
RBC # FLD: 14.2 % — SIGNIFICANT CHANGE UP (ref 10.3–14.5)
RBC # FLD: 14.3 % — SIGNIFICANT CHANGE UP (ref 10.3–14.5)
SAO2 % BLDV: 71 % — SIGNIFICANT CHANGE UP (ref 67–88)
SODIUM SERPL-SCNC: 134 MMOL/L — LOW (ref 135–145)
SODIUM SERPL-SCNC: 135 MMOL/L — SIGNIFICANT CHANGE UP (ref 135–145)
WBC # BLD: 6.2 K/UL — SIGNIFICANT CHANGE UP (ref 3.8–10.5)
WBC # FLD AUTO: 6.2 K/UL — SIGNIFICANT CHANGE UP (ref 3.8–10.5)

## 2018-12-19 PROCEDURE — 74181 MRI ABDOMEN W/O CONTRAST: CPT | Mod: 26

## 2018-12-19 PROCEDURE — 93308 TTE F-UP OR LMTD: CPT | Mod: 26,GC

## 2018-12-19 PROCEDURE — 93010 ELECTROCARDIOGRAM REPORT: CPT

## 2018-12-19 PROCEDURE — 99232 SBSQ HOSP IP/OBS MODERATE 35: CPT

## 2018-12-19 PROCEDURE — 99233 SBSQ HOSP IP/OBS HIGH 50: CPT | Mod: GC,25

## 2018-12-19 RX ORDER — INSULIN HUMAN 100 [IU]/ML
4 INJECTION, SOLUTION SUBCUTANEOUS ONCE
Qty: 0 | Refills: 0 | Status: COMPLETED | OUTPATIENT
Start: 2018-12-19 | End: 2018-12-19

## 2018-12-19 RX ORDER — CALCIUM GLUCONATE 100 MG/ML
1 VIAL (ML) INTRAVENOUS ONCE
Qty: 0 | Refills: 0 | Status: COMPLETED | OUTPATIENT
Start: 2018-12-19 | End: 2018-12-19

## 2018-12-19 RX ORDER — OXYCODONE AND ACETAMINOPHEN 5; 325 MG/1; MG/1
1 TABLET ORAL ONCE
Qty: 0 | Refills: 0 | Status: DISCONTINUED | OUTPATIENT
Start: 2018-12-19 | End: 2018-12-19

## 2018-12-19 RX ORDER — INSULIN GLARGINE 100 [IU]/ML
8 INJECTION, SOLUTION SUBCUTANEOUS AT BEDTIME
Qty: 0 | Refills: 0 | Status: DISCONTINUED | OUTPATIENT
Start: 2018-12-19 | End: 2018-12-20

## 2018-12-19 RX ORDER — INSULIN GLARGINE 100 [IU]/ML
7 INJECTION, SOLUTION SUBCUTANEOUS AT BEDTIME
Qty: 0 | Refills: 0 | Status: DISCONTINUED | OUTPATIENT
Start: 2018-12-19 | End: 2018-12-19

## 2018-12-19 RX ORDER — SENNA PLUS 8.6 MG/1
2 TABLET ORAL AT BEDTIME
Qty: 0 | Refills: 0 | Status: DISCONTINUED | OUTPATIENT
Start: 2018-12-19 | End: 2018-12-20

## 2018-12-19 RX ORDER — LISINOPRIL 2.5 MG/1
40 TABLET ORAL DAILY
Qty: 0 | Refills: 0 | Status: DISCONTINUED | OUTPATIENT
Start: 2018-12-19 | End: 2018-12-20

## 2018-12-19 RX ORDER — SODIUM CHLORIDE 9 MG/ML
250 INJECTION, SOLUTION INTRAVENOUS ONCE
Qty: 0 | Refills: 0 | Status: COMPLETED | OUTPATIENT
Start: 2018-12-19 | End: 2018-12-19

## 2018-12-19 RX ORDER — HEPARIN SODIUM 5000 [USP'U]/ML
5000 INJECTION INTRAVENOUS; SUBCUTANEOUS EVERY 12 HOURS
Qty: 0 | Refills: 0 | Status: DISCONTINUED | OUTPATIENT
Start: 2018-12-19 | End: 2018-12-20

## 2018-12-19 RX ADMIN — DULOXETINE HYDROCHLORIDE 60 MILLIGRAM(S): 30 CAPSULE, DELAYED RELEASE ORAL at 21:28

## 2018-12-19 RX ADMIN — INSULIN HUMAN 4 UNIT(S): 100 INJECTION, SOLUTION SUBCUTANEOUS at 03:40

## 2018-12-19 RX ADMIN — Medication 1: at 13:21

## 2018-12-19 RX ADMIN — Medication 100 MILLIGRAM(S): at 06:09

## 2018-12-19 RX ADMIN — Medication 81 MILLIGRAM(S): at 12:03

## 2018-12-19 RX ADMIN — OXYCODONE AND ACETAMINOPHEN 1 TABLET(S): 5; 325 TABLET ORAL at 23:25

## 2018-12-19 RX ADMIN — CINACALCET 60 MILLIGRAM(S): 30 TABLET, FILM COATED ORAL at 12:04

## 2018-12-19 RX ADMIN — Medication 50 MILLIGRAM(S): at 06:09

## 2018-12-19 RX ADMIN — CLOPIDOGREL BISULFATE 75 MILLIGRAM(S): 75 TABLET, FILM COATED ORAL at 12:03

## 2018-12-19 RX ADMIN — SEVELAMER CARBONATE 800 MILLIGRAM(S): 2400 POWDER, FOR SUSPENSION ORAL at 12:04

## 2018-12-19 RX ADMIN — Medication 200 GRAM(S): at 10:31

## 2018-12-19 RX ADMIN — Medication 100 MILLIGRAM(S): at 13:23

## 2018-12-19 RX ADMIN — OXYCODONE AND ACETAMINOPHEN 1 TABLET(S): 5; 325 TABLET ORAL at 16:25

## 2018-12-19 RX ADMIN — Medication 4 UNIT(S): at 13:22

## 2018-12-19 RX ADMIN — SODIUM CHLORIDE 500 MILLILITER(S): 9 INJECTION, SOLUTION INTRAVENOUS at 03:18

## 2018-12-19 RX ADMIN — OXYCODONE AND ACETAMINOPHEN 1 TABLET(S): 5; 325 TABLET ORAL at 17:00

## 2018-12-19 RX ADMIN — LISINOPRIL 40 MILLIGRAM(S): 2.5 TABLET ORAL at 12:12

## 2018-12-19 RX ADMIN — INSULIN GLARGINE 8 UNIT(S): 100 INJECTION, SOLUTION SUBCUTANEOUS at 22:57

## 2018-12-19 RX ADMIN — Medication 100 MILLIGRAM(S): at 21:28

## 2018-12-19 RX ADMIN — ATORVASTATIN CALCIUM 20 MILLIGRAM(S): 80 TABLET, FILM COATED ORAL at 21:28

## 2018-12-19 RX ADMIN — Medication 1 TABLET(S): at 12:12

## 2018-12-19 NOTE — DIETITIAN INITIAL EVALUATION ADULT. - OTHER INFO
Pt reports dry wt 52.5kg (115lb). Pt seen for: MICU Length Of Stay   Adm dx:  PMH uncontrolled DM1 since age 21 with microvascular complications, CAD, CHF, TIA, PVD, ESRD HD MTThSat,  adm w/nausea, vomiting, diarrhea, AMS, found to be in DKA. Last HD 12/17. Per endo note, on previous hospitalizations, she was noted to not change her pump site every 3 days as she should. She often leaves the insulin pump site on until her insulin runs out.   GI issues: denies N/V/D    Food allergies: NKFA    Vit/supplement PTA: multivitamin, renvela Pt reports dry wt 52.5kg (115lb). Pt seen for: MICU Length Of Stay   Adm dx:  PMH uncontrolled DM1 since age 21 with microvascular complications, CAD, CHF, TIA, PVD, ESRD HD MTThSat,  adm w/nausea, vomiting, diarrhea, AMS, found to be in DKA. Last HD 12/17. Pt w/ multiple adm for DKA. Per endo note, on previous hospitalizations, she was noted to not change her pump site every 3 days as she should. She often leaves the insulin pump site on until her insulin runs out.   GI issues: denies N/V/D    Food allergies: NKFA    Vit/supplement PTA: multivitamin, renvela

## 2018-12-19 NOTE — PROGRESS NOTE ADULT - ASSESSMENT
63 f with  DKA- IVF, Insulin, Endocrine follow  CAD stable.   HTN control  ESRD- Nephrology follow Dr. Schmidt re HD  LFT abnormal- follow  Observe off antibiotics   MRCP to eval CBD dilatation.  MICU care  Pierce Viera MD pager [phone]

## 2018-12-19 NOTE — PROGRESS NOTE ADULT - SUBJECTIVE AND OBJECTIVE BOX
[facility]   [phone]  RENAL FOLLOW UP NOTE  --------------------------------------------------------------------------------  Chief Complaint:    24 hour events/subjective:    seen in icu   states feels better. has pain in legs     PAST HISTORY  --------------------------------------------------------------------------------  No significant changes to PMH, PSH, FHx, SHx, unless otherwise noted    ALLERGIES & MEDICATIONS  --------------------------------------------------------------------------------  Allergies    No Known Allergies    Intolerances      Standing Inpatient Medications  aspirin enteric coated 81 milliGRAM(s) Oral daily  atorvastatin 20 milliGRAM(s) Oral at bedtime  cinacalcet 60 milliGRAM(s) Oral daily  clopidogrel Tablet 75 milliGRAM(s) Oral daily  dextrose 5%. 1000 milliLiter(s) IV Continuous <Continuous>  dextrose 50% Injectable 50 milliLiter(s) IV Push every 15 minutes  dextrose 50% Injectable 25 milliLiter(s) IV Push every 15 minutes  DULoxetine 60 milliGRAM(s) Oral daily  heparin  Injectable 5000 Unit(s) SubCutaneous every 12 hours  hydrALAZINE 100 milliGRAM(s) Oral every 8 hours  insulin glargine Injectable (LANTUS) 8 Unit(s) SubCutaneous at bedtime  insulin lispro (HumaLOG) corrective regimen sliding scale   SubCutaneous three times a day before meals  insulin lispro (HumaLOG) corrective regimen sliding scale   SubCutaneous at bedtime  insulin lispro Injectable (HumaLOG) 4 Unit(s) SubCutaneous three times a day before meals  lisinopril 40 milliGRAM(s) Oral daily  metoprolol succinate ER 50 milliGRAM(s) Oral daily  multivitamin 1 Tablet(s) Oral daily  sevelamer hydrochloride 800 milliGRAM(s) Oral three times a day with meals    PRN Inpatient Medications  acetaminophen   Tablet .. 650 milliGRAM(s) Oral every 6 hours PRN  dextrose 40% Gel 15 Gram(s) Oral once PRN  glucagon  Injectable 1 milliGRAM(s) IntraMuscular once PRN      REVIEW OF SYSTEMS  --------------------------------------------------------------------------------    Gen: denies  fevers/chills,  CVS: denies chest pain/palpitations  Resp: denies SOB/Cough  GI: Denies N/V/Abd pain  : Denies dysuria    All other systems were reviewed and are negative, except as noted.    VITALS/PHYSICAL EXAM  --------------------------------------------------------------------------------  T(C): 36.7 (12-19-18 @ 16:00), Max: 37.2 (12-19-18 @ 04:00)  HR: 80 (12-19-18 @ 18:00) (74 - 85)  BP: 135/65 (12-19-18 @ 18:00) (121/60 - 173/80)  RR: 21 (12-19-18 @ 18:00) (12 - 33)  SpO2: 96% (12-19-18 @ 18:00) (94% - 99%)  Wt(kg): --        12-18-18 @ 07:01  -  12-19-18 @ 07:00  --------------------------------------------------------  IN: 992.5 mL / OUT: 1500 mL / NET: -507.5 mL    12-19-18 @ 07:01  -  12-19-18 @ 20:03  --------------------------------------------------------  IN: 300 mL / OUT: 0 mL / NET: 300 mL      Physical Exam:  	  Gen: alert oriented place person and date   	Pulm: Decreased breath sounds b/l bases no rales or ronchi  	CV: RRR, S1/S2  	Abd: +BS, soft, nontender/nondistended  	Extremity: No cyanosis, no edema no clubbing  	    LABS/STUDIES  --------------------------------------------------------------------------------              10.5   6.2   >-----------<  236      [12-19-18 @ 01:41]              31.9     134  |  95  |  8   ----------------------------<  124      [12-19-18 @ 06:27]  4.2   |  24  |  2.43        Ca     7.7     [12-19-18 @ 06:27]      Mg     1.9     [12-19-18 @ 06:27]      Phos  2.7     [12-19-18 @ 06:27]    TPro  5.9  /  Alb  3.6  /  TBili  0.2  /  DBili  x   /  AST  62  /  ALT  40  /  AlkPhos  75  [12-19-18 @ 01:41]    PT/INR: PT 11.8 , INR 1.03       [12-18-18 @ 01:14]  PTT: 25.9       [12-18-18 @ 01:14]      Creatinine Trend:  SCr 2.43 [12-19 @ 06:27]  SCr 2.16 [12-19 @ 01:41]  SCr 3.14 [12-18 @ 12:57]  SCr 2.88 [12-18 @ 10:11]  SCr 2.63 [12-18 @ 05:03]                  PTH -- (Ca 8.3)      [03-03-18 @ 08:53]   134  HbA1c 8.6      [11-16-18 @ 20:14]  TSH 0.71      [11-17-18 @ 08:25]  Lipid: chol 113, TG 86, HDL 59, LDL 37      [04-30-18 @ 06:44]

## 2018-12-19 NOTE — DIETITIAN INITIAL EVALUATION ADULT. - ADHERENCE
Pt states that she tries to watch carb, Na, K intake. On insulin pump at home, unsure of last A1c, unable to give BG range.  A1c 11/16 8.6. Pt states that she tries to watch carb, Na, K intake. On insulin pump at home (non adherent per endo), unsure of last A1c, unable to give BG range.  A1c 11/16 8.6. Pt states that she tries to watch carb, Na, K intake, declined giving diet hx. On insulin pump at home (non adherent per endo), unsure of last A1c, unable to give BG range.  A1c 11/16 8.6. n/a

## 2018-12-19 NOTE — PROGRESS NOTE ADULT - SUBJECTIVE AND OBJECTIVE BOX
MICU PROGRESS NOTE    Joesph Dietz MD  PGY-1  Extension [phone]    CHIEF COMPLAINT: DKA    Interval events / Subjective:   Pt had poor appetite and minimal intake. Pre-meal insulin held and gave 7U lantus instead of 9U, glucose went up to 220s and anion gap of 18. 4U regular insulin was given and anion back to 15 and glucose to 120s. No other acute events.     REVIEW OF SYSTEMS:  Constitutional: [ ] negative [ ] fevers [ ] chills [ ] weight loss [ ] weight gain  HEENT: [ ] negative [ ] dry eyes [ ] eye irritation [ ] postnasal drip [ ] nasal congestion  CV: [ ] negative  [ ] chest pain [ ] orthopnea [ ] palpitations [ ] murmur  Resp: [ ] negative [ ] cough [ ] shortness of breath [ ] dyspnea [ ] wheezing [ ] sputum [ ] hemoptysis  GI: [ ] negative [ ] nausea [ ] vomiting [ ] diarrhea [ ] constipation [ ] abd pain [ ] dysphagia   : [ ] negative [ ] dysuria [ ] nocturia [ ] hematuria [ ] increased urinary frequency  Musculoskeletal: [ ] negative [ ] back pain [ ] myalgias [ ] arthralgias [ ] fracture  Skin: [ ] negative [ ] rash [ ] itch  Neurological: [ ] negative [ ] headache [ ] dizziness [ ] syncope [ ] weakness [ ] numbness  Psychiatric: [ ] negative [ ] anxiety [ ] depression  Endocrine: [ ] negative [ ] diabetes [ ] thyroid problem  Hematologic/Lymphatic: [ ] negative [ ] anemia [ ] bleeding problem  Allergic/Immunologic: [ ] negative [ ] itchy eyes [ ] nasal discharge [ ] hives [ ] angioedema  [ ] All other systems negative  [ ] Unable to assess ROS because ________    OBJECTIVE:  ICU Vital Signs Last 24 Hrs  T(C): 37.2 (19 Dec 2018 04:00), Max: 37.2 (19 Dec 2018 04:00)  T(F): 98.9 (19 Dec 2018 04:00), Max: 98.9 (19 Dec 2018 04:00)  HR: 75 (19 Dec 2018 06:00) (72 - 82)  BP: 137/63 (19 Dec 2018 06:00) (129/62 - 166/81)  BP(mean): 91 (19 Dec 2018 06:00) (89 - 116)  ABP: --  ABP(mean): --  RR: 13 (19 Dec 2018 06:00) (11 - 19)  SpO2: 98% (19 Dec 2018 06:00) (94% - 98%)        12-18 @ 07:01  -  12-19 @ 07:00  --------------------------------------------------------  IN: 992.5 mL / OUT: 1500 mL / NET: -507.5 mL      CAPILLARY BLOOD GLUCOSE      POCT Blood Glucose.: 156 mg/dL (19 Dec 2018 05:01)      PHYSICAL EXAM:  General: NAD, good hygiene, well developed  HENT: Atraumatic, PERRLA, no conjunctivae injection  Neck: normal ROM and trachea midline   Cardiovascular: RRR, S1&2, no M or R, radial pulses equal and b/l  Respiratory: LLL crackles, decreased breath sounds on right   Abdominal:  soft and non-tender non distended, neg CVA tenderness   Extremities: +1 pitting edema of the legs/feet, DP/PT equal b/l  Skin: warm, well perfused  Neurologic: nonfocal, AAOx3  Psych: normal mood and affect     LINES: peripherial       HOSPITAL MEDICATIONS:  Standing Meds:  aspirin enteric coated 81 milliGRAM(s) Oral daily  atorvastatin 20 milliGRAM(s) Oral at bedtime  cinacalcet 60 milliGRAM(s) Oral daily  clopidogrel Tablet 75 milliGRAM(s) Oral daily  dextrose 5%. 1000 milliLiter(s) IV Continuous <Continuous>  dextrose 50% Injectable 50 milliLiter(s) IV Push every 15 minutes  dextrose 50% Injectable 25 milliLiter(s) IV Push every 15 minutes  DULoxetine 60 milliGRAM(s) Oral daily  heparin  Injectable 5000 Unit(s) SubCutaneous every 8 hours  hydrALAZINE 100 milliGRAM(s) Oral every 8 hours  insulin glargine Injectable (LANTUS) 7 Unit(s) SubCutaneous <User Schedule>  insulin lispro (HumaLOG) corrective regimen sliding scale   SubCutaneous three times a day before meals  insulin lispro (HumaLOG) corrective regimen sliding scale   SubCutaneous at bedtime  insulin lispro Injectable (HumaLOG) 4 Unit(s) SubCutaneous three times a day before meals  metoprolol succinate ER 50 milliGRAM(s) Oral daily  multivitamin 1 Tablet(s) Oral daily  sevelamer hydrochloride 800 milliGRAM(s) Oral three times a day with meals      PRN Meds:  acetaminophen   Tablet .. 650 milliGRAM(s) Oral every 6 hours PRN  dextrose 40% Gel 15 Gram(s) Oral once PRN  glucagon  Injectable 1 milliGRAM(s) IntraMuscular once PRN  ondansetron Injectable 8 milliGRAM(s) IV Push every 8 hours PRN      LABS:                        10.5   6.2   )-----------( 236      ( 19 Dec 2018 01:41 )             31.9     Hgb Trend: 10.5<--, 9.9<--, 9.5<--, 10.8<--  12-19    134<L>  |  95<L>  |  8   ----------------------------<  124<H>  4.2   |  24  |  2.43<H>    Ca    7.7<L>      19 Dec 2018 06:27  Phos  2.7     12-19  Mg     1.9     12-19    TPro  5.9<L>  /  Alb  3.6  /  TBili  0.2  /  DBili  x   /  AST  62<H>  /  ALT  40  /  AlkPhos  75  12-19    Creatinine Trend: 2.43<--, 2.16<--, 3.14<--, 2.88<--, 2.63<--, 2.30<--  PT/INR - ( 18 Dec 2018 01:14 )   PT: 11.8 sec;   INR: 1.03 ratio         PTT - ( 18 Dec 2018 01:14 )  PTT:25.9 sec      Venous Blood Gas:  12-19 @ 06:22  7.42/48/40/30/71  VBG Lactate: 1.9  Venous Blood Gas:  12-18 @ 10:09  7.44/47/35/31/60  VBG Lactate: 1.5  Venous Blood Gas:  12-18 @ 04:56  7.46/43/51/31/88  VBG Lactate: 1.4  Venous Blood Gas:  12-18 @ 00:59  7.45/44/45/30/81  VBG Lactate: 1.6  Venous Blood Gas:  12-17 @ 19:27  --/--/--/--/--  VBG Lactate: 3.5  Venous Blood Gas:  12-17 @ 15:14  7.29/45/36/21/54  VBG Lactate: 7.4      MICROBIOLOGY:     Culture - Blood (collected 17 Dec 2018 18:21)  Source: .Blood Blood  Preliminary Report (18 Dec 2018 19:01):    No growth to date.    Culture - Blood (collected 17 Dec 2018 18:20)  Source: .Blood Blood  Preliminary Report (18 Dec 2018 19:01):    No growth to date.      RADIOLOGY:  [ ] Reviewed and interpreted by me    EKG: MICU PROGRESS NOTE    Joesph Dietz MD  PGY-1  Extension [phone]    CHIEF COMPLAINT: DKA    Interval events / Subjective:   Pt had poor appetite and minimal intake. Pre-meal insulin held and gave 7U lantus instead of 9U, glucose went up to 220s and anion gap of 18. 4U regular insulin was given and anion back to 15 and glucose to 120s. No other acute events.     REVIEW OF SYSTEMS:  General: denies fever, chills, fatigue  HENT: denies nasal congestion, sore throat, ear pain, hearing loss  Eyes: denies visual changes  Neck: denies neck pain, swelling, stiffness  CV: denies chest pain, palpitations  Resp: denies difficulty breathing, cough  GI: denies nausea, vomiting, diarrhea, abdominal pain, constipation  Urinary: denies pain on urination change  MSK: denies joint and muscle pain  Neuro: denies headaches, lightheadedness  Skin: denies rashes     OBJECTIVE:  ICU Vital Signs Last 24 Hrs  T(C): 37.2 (19 Dec 2018 04:00), Max: 37.2 (19 Dec 2018 04:00)  T(F): 98.9 (19 Dec 2018 04:00), Max: 98.9 (19 Dec 2018 04:00)  HR: 75 (19 Dec 2018 06:00) (72 - 82)  BP: 137/63 (19 Dec 2018 06:00) (129/62 - 166/81)  BP(mean): 91 (19 Dec 2018 06:00) (89 - 116)  ABP: --  ABP(mean): --  RR: 13 (19 Dec 2018 06:00) (11 - 19)  SpO2: 98% (19 Dec 2018 06:00) (94% - 98%)    12-18 @ 07:01  -  12-19 @ 07:00  --------------------------------------------------------  IN: 992.5 mL / OUT: 1500 mL / NET: -507.5 mL    CAPILLARY BLOOD GLUCOSE    POCT Blood Glucose.: 156 mg/dL (19 Dec 2018 05:01)    PHYSICAL EXAM:  General: NAD, good hygiene, well developed  HENT: Atraumatic, PERRLA, no conjunctivae injection  Neck: normal ROM and trachea midline   Cardiovascular: RRR, S1&2, no M or R, radial pulses equal and b/l  Respiratory: LLL crackles, decreased breath sounds on right   Abdominal:  soft and non-tender non distended, neg CVA tenderness   Extremities: +1 pitting edema of the legs/feet, DP/PT equal b/l  Skin: warm, well perfused  Neurologic: nonfocal, AAOx3  Psych: normal mood and affect     LINES: peripherial     HOSPITAL MEDICATIONS:  Standing Meds:  aspirin enteric coated 81 milliGRAM(s) Oral daily  atorvastatin 20 milliGRAM(s) Oral at bedtime  cinacalcet 60 milliGRAM(s) Oral daily  clopidogrel Tablet 75 milliGRAM(s) Oral daily  dextrose 5%. 1000 milliLiter(s) IV Continuous <Continuous>  dextrose 50% Injectable 50 milliLiter(s) IV Push every 15 minutes  dextrose 50% Injectable 25 milliLiter(s) IV Push every 15 minutes  DULoxetine 60 milliGRAM(s) Oral daily  heparin  Injectable 5000 Unit(s) SubCutaneous every 8 hours  hydrALAZINE 100 milliGRAM(s) Oral every 8 hours  insulin glargine Injectable (LANTUS) 7 Unit(s) SubCutaneous <User Schedule>  insulin lispro (HumaLOG) corrective regimen sliding scale   SubCutaneous three times a day before meals  insulin lispro (HumaLOG) corrective regimen sliding scale   SubCutaneous at bedtime  insulin lispro Injectable (HumaLOG) 4 Unit(s) SubCutaneous three times a day before meals  metoprolol succinate ER 50 milliGRAM(s) Oral daily  multivitamin 1 Tablet(s) Oral daily  sevelamer hydrochloride 800 milliGRAM(s) Oral three times a day with meals    PRN Meds:  acetaminophen   Tablet .. 650 milliGRAM(s) Oral every 6 hours PRN  dextrose 40% Gel 15 Gram(s) Oral once PRN  glucagon  Injectable 1 milliGRAM(s) IntraMuscular once PRN  ondansetron Injectable 8 milliGRAM(s) IV Push every 8 hours PRN      LABS:                        10.5   6.2   )-----------( 236      ( 19 Dec 2018 01:41 )             31.9     Hgb Trend: 10.5<--, 9.9<--, 9.5<--, 10.8<--  12-19    134<L>  |  95<L>  |  8   ----------------------------<  124<H>  4.2   |  24  |  2.43<H>    Ca    7.7<L>      19 Dec 2018 06:27  Phos  2.7     12-19  Mg     1.9     12-19    TPro  5.9<L>  /  Alb  3.6  /  TBili  0.2  /  DBili  x   /  AST  62<H>  /  ALT  40  /  AlkPhos  75  12-19    Creatinine Trend: 2.43<--, 2.16<--, 3.14<--, 2.88<--, 2.63<--, 2.30<--  PT/INR - ( 18 Dec 2018 01:14 )   PT: 11.8 sec;   INR: 1.03 ratio      PTT - ( 18 Dec 2018 01:14 )  PTT:25.9 sec    Venous Blood Gas:  12-19 @ 06:22  7.42/48/40/30/71  VBG Lactate: 1.9  Venous Blood Gas:  12-18 @ 10:09  7.44/47/35/31/60  VBG Lactate: 1.5  Venous Blood Gas:  12-18 @ 04:56  7.46/43/51/31/88  VBG Lactate: 1.4  Venous Blood Gas:  12-18 @ 00:59  7.45/44/45/30/81  VBG Lactate: 1.6  Venous Blood Gas:  12-17 @ 19:27  --/--/--/--/--  VBG Lactate: 3.5  Venous Blood Gas:  12-17 @ 15:14  7.29/45/36/21/54  VBG Lactate: 7.4    MICROBIOLOGY:     Culture - Blood (collected 17 Dec 2018 18:21)  Source: .Blood Blood  Preliminary Report (18 Dec 2018 19:01):    No growth to date.    Culture - Blood (collected 17 Dec 2018 18:20)  Source: .Blood Blood  Preliminary Report (18 Dec 2018 19:01):    No growth to date.      RADIOLOGY:  [ ] Reviewed and interpreted by me    EKG:

## 2018-12-19 NOTE — PROGRESS NOTE ADULT - ASSESSMENT
62 yo F pt w/PMHX CAD, CHF (EF 50% 10/2018), TIA, PVD, ESRD HD MTThSat, last HD Sat, presenting w/nausea, vomiting, diarrhea, AMS, found to be in DKA. As per pt and chart notes, pt had Chinese food with her family yesterday, and the entire family then began experiencing dull abdominal pain and diarrhea. Pt endorses dull lower abdominal pain, NBNB vomiting x1, and diarrhea x1. she is now in dka as well as lactic acidosis and hyperkalemia    1- esrd  2- lactic acidosis resolved   3- DKA  4- hyperkalemia improved with hd   5- htn        hd am   cont insulin regimen per endo recommendations  hydralazine 100 mg q8 /lisinopril 40 mg qd  shpt cont renvela one tab with meals  check pth in am

## 2018-12-19 NOTE — PROGRESS NOTE ADULT - SUBJECTIVE AND OBJECTIVE BOX
Diabetes Follow up note:  Interval Hx:      Review of Systems:  General:  GI: Tolerating POs without any N/V/D/ABD PAIN.  CV: No CP/SOB  ENDO: No S&Sx of hypoglycemia  MEDS:  atorvastatin 20 milliGRAM(s) Oral at bedtime  cinacalcet 60 milliGRAM(s) Oral daily    insulin glargine Injectable (LANTUS) 8 Unit(s) SubCutaneous at bedtime  insulin lispro (HumaLOG) corrective regimen sliding scale   SubCutaneous three times a day before meals  insulin lispro (HumaLOG) corrective regimen sliding scale   SubCutaneous at bedtime  insulin lispro Injectable (HumaLOG) 4 Unit(s) SubCutaneous three times a day before meals      Allergies    No Known Allergies      PE:  General:  Vital Signs Last 24 Hrs  T(C): 36.7 (19 Dec 2018 12:00), Max: 37.2 (19 Dec 2018 04:00)  T(F): 98 (19 Dec 2018 12:00), Max: 98.9 (19 Dec 2018 04:00)  HR: 77 (19 Dec 2018 11:00) (72 - 82)  BP: 155/78 (19 Dec 2018 11:00) (129/62 - 173/80)  BP(mean): 110 (19 Dec 2018 11:00) (89 - 116)  RR: 33 (19 Dec 2018 11:00) (11 - 33)  SpO2: 97% (19 Dec 2018 11:00) (94% - 98%)  Abd: Soft, NT,ND,   Extremities: Warm  Neuro: A&O X3    LABS:    POCT Blood Glucose.: 166 mg/dL (12-19-18 @ 12:07)  POCT Blood Glucose.: 135 mg/dL (12-19-18 @ 08:28)  POCT Blood Glucose.: 156 mg/dL (12-19-18 @ 05:01)  POCT Blood Glucose.: 208 mg/dL (12-19-18 @ 04:06)  POCT Blood Glucose.: 224 mg/dL (12-19-18 @ 03:11)  POCT Blood Glucose.: 144 mg/dL (12-18-18 @ 21:05)  POCT Blood Glucose.: 107 mg/dL (12-18-18 @ 18:44)  POCT Blood Glucose.: 94 mg/dL (12-18-18 @ 18:25)  POCT Blood Glucose.: 57 mg/dL (12-18-18 @ 18:09)  POCT Blood Glucose.: 53 mg/dL (12-18-18 @ 17:54)  POCT Blood Glucose.: 157 mg/dL (12-18-18 @ 12:24)  POCT Blood Glucose.: 149 mg/dL (12-18-18 @ 11:16)  POCT Blood Glucose.: 156 mg/dL (12-18-18 @ 10:09)  POCT Blood Glucose.: 454 mg/dL (12-18-18 @ 10:04)  POCT Blood Glucose.: 162 mg/dL (12-18-18 @ 09:09)  POCT Blood Glucose.: 144 mg/dL (12-18-18 @ 07:57)  POCT Blood Glucose.: 151 mg/dL (12-18-18 @ 06:54)  POCT Blood Glucose.: 157 mg/dL (12-18-18 @ 06:05)  POCT Blood Glucose.: 153 mg/dL (12-18-18 @ 04:09)  POCT Blood Glucose.: 157 mg/dL (12-18-18 @ 02:59)  POCT Blood Glucose.: 168 mg/dL (12-18-18 @ 02:08)  POCT Blood Glucose.: 201 mg/dL (12-18-18 @ 00:48)  POCT Blood Glucose.: 196 mg/dL (12-18-18 @ 00:01)  POCT Blood Glucose.: 170 mg/dL (12-17-18 @ 23:05)  POCT Blood Glucose.: 134 mg/dL (12-17-18 @ 22:36)  POCT Blood Glucose.: 133 mg/dL (12-17-18 @ 21:58)  POCT Blood Glucose.: 156 mg/dL (12-17-18 @ 21:01)  POCT Blood Glucose.: 157 mg/dL (12-17-18 @ 20:59)  POCT Blood Glucose.: 339 mg/dL (12-17-18 @ 19:55)  POCT Blood Glucose.: 299 mg/dL (12-17-18 @ 19:50)  POCT Blood Glucose.: 525 mg/dL (12-17-18 @ 18:46)  POCT Blood Glucose.: 565 mg/dL (12-17-18 @ 18:26)  POCT Blood Glucose.: 531 mg/dL (12-17-18 @ 18:25)  POCT Blood Glucose.: 569 mg/dL (12-17-18 @ 17:45)  POCT Blood Glucose.: >600 mg/dL (12-17-18 @ 17:45)  POCT Blood Glucose.: >600 mg/dL (12-17-18 @ 14:45)  POCT Blood Glucose.: >600 mg/dL (12-17-18 @ 14:44)                            10.5   6.2   )-----------( 236      ( 19 Dec 2018 01:41 )             31.9       12-19    134<L>  |  95<L>  |  8   ----------------------------<  124<H>  4.2   |  24  |  2.43<H>    Ca    7.7<L>      19 Dec 2018 06:27  Phos  2.7     12-19  Mg     1.9     12-19    TPro  5.9<L>  /  Alb  3.6  /  TBili  0.2  /  DBili  x   /  AST  62<H>  /  ALT  40  /  AlkPhos  75  12-19      Thyroid Function Tests:      Hemoglobin A1C, Whole Blood: 8.6 % <H> [4.0 - 5.6] (11-16-18 @ 20:14)  Hemoglobin A1C, Whole Blood: 9.1 % <H> [4.0 - 5.6] (10-16-18 @ 08:13)            Contact number: isabel [phone] or [phone] Diabetes Follow up note:  Interval Hx:  63 year old female w/uncontrolled T1DM all known microvascular complications, CAD, CHF (EF 50% 10/2018), TIA, PVD, ESRD HD MTThSat, last HD Sat, presenting w/nausea, vomiting, diarrhea, AMS, found to be in DKA. DKA now resolved on basal/bolus regimen while inpatient. Awaiting MRCP today. Pt seen at bedside upset that she won't be able to go home today and thinks that today is [place] Patti. Pt ran out of insulin on Friday and was unable to get her a refill as her private endo was not getting back to her. She was injecting regular insulin on Saturday and Sunday prior to admission.       Review of Systems:  General: "I feel better, I just want to go home"  GI: Tolerating POs without any N/V/D/ABD PAIN.  CV: No CP/SOB  ENDO: No S&Sx of hypoglycemia  MEDS:  atorvastatin 20 milliGRAM(s) Oral at bedtime  cinacalcet 60 milliGRAM(s) Oral daily    insulin glargine Injectable (LANTUS) 8 Unit(s) SubCutaneous at bedtime  insulin lispro (HumaLOG) corrective regimen sliding scale   SubCutaneous three times a day before meals  insulin lispro (HumaLOG) corrective regimen sliding scale   SubCutaneous at bedtime  insulin lispro Injectable (HumaLOG) 4 Unit(s) SubCutaneous three times a day before meals      Allergies    No Known Allergies      PE:  General: Female lying in bed. NAD.   Vital Signs Last 24 Hrs  T(C): 36.7 (19 Dec 2018 12:00), Max: 37.2 (19 Dec 2018 04:00)  T(F): 98 (19 Dec 2018 12:00), Max: 98.9 (19 Dec 2018 04:00)  HR: 77 (19 Dec 2018 11:00) (72 - 82)  BP: 155/78 (19 Dec 2018 11:00) (129/62 - 173/80)  BP(mean): 110 (19 Dec 2018 11:00) (89 - 116)  RR: 33 (19 Dec 2018 11:00) (11 - 33)  SpO2: 97% (19 Dec 2018 11:00) (94% - 98%)  CV: S1, S2. NSR on monitor.   Abd: Soft, NT,ND,   Extremities: Warm. no edema.   Neuro: A&O X2. Unaware of date.     LABS:    POCT Blood Glucose.: 166 mg/dL (12-19-18 @ 12:07)  POCT Blood Glucose.: 135 mg/dL (12-19-18 @ 08:28)  POCT Blood Glucose.: 156 mg/dL (12-19-18 @ 05:01)  POCT Blood Glucose.: 208 mg/dL (12-19-18 @ 04:06)  POCT Blood Glucose.: 224 mg/dL (12-19-18 @ 03:11)  POCT Blood Glucose.: 144 mg/dL (12-18-18 @ 21:05)  POCT Blood Glucose.: 107 mg/dL (12-18-18 @ 18:44)  POCT Blood Glucose.: 94 mg/dL (12-18-18 @ 18:25)  POCT Blood Glucose.: 57 mg/dL (12-18-18 @ 18:09)  POCT Blood Glucose.: 53 mg/dL (12-18-18 @ 17:54)  POCT Blood Glucose.: 157 mg/dL (12-18-18 @ 12:24)  POCT Blood Glucose.: 149 mg/dL (12-18-18 @ 11:16)  POCT Blood Glucose.: 156 mg/dL (12-18-18 @ 10:09)  POCT Blood Glucose.: 454 mg/dL (12-18-18 @ 10:04)  POCT Blood Glucose.: 162 mg/dL (12-18-18 @ 09:09)  POCT Blood Glucose.: 144 mg/dL (12-18-18 @ 07:57)  POCT Blood Glucose.: 151 mg/dL (12-18-18 @ 06:54)  POCT Blood Glucose.: 157 mg/dL (12-18-18 @ 06:05)  POCT Blood Glucose.: 153 mg/dL (12-18-18 @ 04:09)  POCT Blood Glucose.: 157 mg/dL (12-18-18 @ 02:59)  POCT Blood Glucose.: 168 mg/dL (12-18-18 @ 02:08)  POCT Blood Glucose.: 201 mg/dL (12-18-18 @ 00:48)  POCT Blood Glucose.: 196 mg/dL (12-18-18 @ 00:01)  POCT Blood Glucose.: 170 mg/dL (12-17-18 @ 23:05)  POCT Blood Glucose.: 134 mg/dL (12-17-18 @ 22:36)  POCT Blood Glucose.: 133 mg/dL (12-17-18 @ 21:58)  POCT Blood Glucose.: 156 mg/dL (12-17-18 @ 21:01)  POCT Blood Glucose.: 157 mg/dL (12-17-18 @ 20:59)  POCT Blood Glucose.: 339 mg/dL (12-17-18 @ 19:55)  POCT Blood Glucose.: 299 mg/dL (12-17-18 @ 19:50)  POCT Blood Glucose.: 525 mg/dL (12-17-18 @ 18:46)  POCT Blood Glucose.: 565 mg/dL (12-17-18 @ 18:26)  POCT Blood Glucose.: 531 mg/dL (12-17-18 @ 18:25)  POCT Blood Glucose.: 569 mg/dL (12-17-18 @ 17:45)  POCT Blood Glucose.: >600 mg/dL (12-17-18 @ 17:45)  POCT Blood Glucose.: >600 mg/dL (12-17-18 @ 14:45)  POCT Blood Glucose.: >600 mg/dL (12-17-18 @ 14:44)                            10.5   6.2   )-----------( 236      ( 19 Dec 2018 01:41 )             31.9       12-19    134<L>  |  95<L>  |  8   ----------------------------<  124<H>  4.2   |  24  |  2.43<H>    Ca    7.7<L>      19 Dec 2018 06:27  Phos  2.7     12-19  Mg     1.9     12-19    TPro  5.9<L>  /  Alb  3.6  /  TBili  0.2  /  DBili  x   /  AST  62<H>  /  ALT  40  /  AlkPhos  75  12-19        Hemoglobin A1C, Whole Blood: 8.6 % <H> [4.0 - 5.6] (11-16-18 @ 20:14)  Hemoglobin A1C, Whole Blood: 9.1 % <H> [4.0 - 5.6] (10-16-18 @ 08:13)            Contact number: isabel [phone] or [phone]

## 2018-12-19 NOTE — PROGRESS NOTE ADULT - SUBJECTIVE AND OBJECTIVE BOX
Cardiovascular Disease Progress Note    Overnight events: No acute events overnight.  dka resolved. awaiting transfer out of icu. denies any cp/sob/pnd/orthopnea/palps  Otherwise review of systems negative    Objective Findings:  T(C): 37.2 (18 @ 04:00), Max: 37.2 (18 @ 04:00)  HR: 75 (18 @ 06:00) (72 - 82)  BP: 137/63 (18 @ 06:00) (129/62 - 166/81)  RR: 13 (18 @ 06:00) (11 - 19)  SpO2: 98% (18 @ 06:00) (94% - 98%)  Wt(kg): --  Daily     Daily Weight in k.9 (18 Dec 2018 15:56)      Physical Exam:  Gen: NAD  HEENT: EOMI  CV: RRR, normal S1 + S2, no m/r/g  Lungs: CTAB  Abd: soft, non-tender  Ext: No edema    Telemetry: nsr    Laboratory Data:                        10.5   6.2   )-----------( 236      ( 19 Dec 2018 01:41 )             31.9         134<L>  |  95<L>  |  8   ----------------------------<  124<H>  4.2   |  24  |  2.43<H>    Ca    7.7<L>      19 Dec 2018 06:27  Phos  2.7       Mg     1.9         TPro  5.9<L>  /  Alb  3.6  /  TBili  0.2  /  DBili  x   /  AST  62<H>  /  ALT  40  /  AlkPhos  75      PT/INR - ( 18 Dec 2018 01:14 )   PT: 11.8 sec;   INR: 1.03 ratio         PTT - ( 18 Dec 2018 01:14 )  PTT:25.9 sec          Inpatient Medications:  MEDICATIONS  (STANDING):  aspirin enteric coated 81 milliGRAM(s) Oral daily  atorvastatin 20 milliGRAM(s) Oral at bedtime  calcium gluconate IVPB 1 Gram(s) IV Intermittent once  cinacalcet 60 milliGRAM(s) Oral daily  clopidogrel Tablet 75 milliGRAM(s) Oral daily  dextrose 5%. 1000 milliLiter(s) (50 mL/Hr) IV Continuous <Continuous>  dextrose 50% Injectable 50 milliLiter(s) IV Push every 15 minutes  dextrose 50% Injectable 25 milliLiter(s) IV Push every 15 minutes  DULoxetine 60 milliGRAM(s) Oral daily  heparin  Injectable 5000 Unit(s) SubCutaneous every 8 hours  hydrALAZINE 100 milliGRAM(s) Oral every 8 hours  insulin glargine Injectable (LANTUS) 7 Unit(s) SubCutaneous <User Schedule>  insulin lispro (HumaLOG) corrective regimen sliding scale   SubCutaneous three times a day before meals  insulin lispro (HumaLOG) corrective regimen sliding scale   SubCutaneous at bedtime  insulin lispro Injectable (HumaLOG) 4 Unit(s) SubCutaneous three times a day before meals  metoprolol succinate ER 50 milliGRAM(s) Oral daily  multivitamin 1 Tablet(s) Oral daily  sevelamer hydrochloride 800 milliGRAM(s) Oral three times a day with meals      Assessment:  - DKA in the setting of medication non-compliance and possible sepsis (GI source)  -ischemic cardiomyopathy, cad s/p multiple stents  -esrd on hd  -PAD s/p prior intervention   -remote TIA  -prolonged QTc  -HTN      Recs:  -management of DKA per MICU --> resolved f/u endo regarding management of insulin pump  -no true ischemic sx. ekg with nonspecific ST changes. would defer ischemic work up for now. avoid qtc prolonging meds. would repeat ecg now that electrolytes repleted and acute issues resolved  -c/w asa, plavix, statin, bb and hydralazine for ischemic cardiomyopathy and HTN  -dvt ppx      Over 25 minutes spent on total encounter; more than 50% of the visit was spent counseling and/or coordinating care by the attending physician.      Julián Biswas MD   Cardiovascular Disease  [phone]

## 2018-12-19 NOTE — PROGRESS NOTE ADULT - ASSESSMENT
Assessment and plan:  63F, h.o ESRD (MTThSa), DM (medtronic pump), CAD, CHF, TIA, PVD, p.w vomiting, diarrhea, AMS.     #NEUROLOGY: AMS  - AMS resolved, mostly due to DKA  - AAOx3    #PULMONOLOGY: Not intubated  - no acute issues    #CARDIOVASCULAR: CAD   - metoprolol 50 qd  - start hydralazine 100 q8hr     #GASTROENTEROLOGY:   a) transaminitis  - LFTs trending down   - chronic dilated intrahepatic biliary duct, CBD  - mostly due to post-cholecystectomy  - no abdominal complaint    b) nausea & vomiting  - 2/2 DKA vs enteric infection   - afebrile, no wbc, unlikely infection, will d/c zosyn and vanco     #RENAL / FLUID  a) ESRD  - HD last night, MTThSat  - taken out 500ml   - HD tonight     #INFECTIOUS DISEASE  a) abdominal pain, emesis, possible infectious etiology   - CT doesn't show infectious   - d/c abx   	  #HEMATOLOGY/DVT PPx  - heparin for DVT     #ENDOCRINE  a) DKA   - Anion gap closing at 19, patient has h.o persistent anion gap due to ESRD   - consult endo basal insulin  - bridge insulin     #SKIN/LINES  - peripheral line - no ulcers Assessment and plan:  63F, h.o ESRD (MTThSa), DM (medtronic pump), CAD, CHF, TIA, PVD, p.w vomiting, diarrhea, AMS.     #NEUROLOGY: AMS  - AMS resolved, mostly due to DKA  - AAOx3    #PULMONOLOGY: Not intubated  - no acute issues    #CARDIOVASCULAR: CAD   - metoprolol 50 qd  - hydralazine 100 q8hr   - start on lisinopril 40mg     #GASTROENTEROLOGY:   a) transaminitis  - LFTs trending down   - chronic dilated intrahepatic biliary duct, CBD  - mostly due to post-cholecystectomy  - no abdominal complaint    b) nausea & vomiting  - resolved, most unlikely DKA precipitated by food poisoning  - afebrile, no wbc, unlikely infection, no abx     #RENAL / FLUID  a) ESRD  - HD MTThSat  - 1500cc yesterday     #INFECTIOUS DISEASE  a) abdominal pain, emesis, possible food poisoning   - CT doesn't show infectious   - d/c abx   	  #HEMATOLOGY/DVT PPx  - heparin for DVT BID     #ENDOCRINE  a) DKA   - Anion gap closed   - consult endo for basal insulin    #SKIN/LINES  - peripheral line - no ulcers 62yo F with hx ESRD (MTuThSa), DM type 1 (Medtronic insulin pump), CAD, CHF, TIA, PVD, p.w vomiting, diarrhea, AMS 2/2 DKA now resolved.     #NEUROLOGY: AMS  - AMS resolved, 2/2 metabolic encephalopathy from DKA  - AAOx3    #PULMONOLOGY:  - no acute issues, on RA    #CARDIOVASCULAR: CAD   Prolonged QTc  - metoprolol 50 qd, hydralazine 100 q8hr   -Hypertensive, restart home lisinopril 40mg daily  -EKG today for QTc    #GASTROENTEROLOGY:   a) Chronically dilated intrahepatic biliary duct  -possibly due to s/p cholecystectomy  -MRCP today 9:30pm, NPO 4 hours prior    b) transaminitis  - LFTs trending down, continue to monitor. Possibly 2/2 fatty liver    c) nausea & vomiting: resolved  - 2/2 DKA  - afebrile, no wbc, unlikely infection, off abx    #RENAL / FLUID: ESRD on MTuThSat  - HD tmrw  -continue phos binder    #INFECTIOUS DISEASE  -Possible gastroenteritis vs N/V due to DKA  - CT doesn't show infectious   - monitor off abx  	  #HEMATOLOGY/DVT PPx  - DVT ppx: HSQ    #ENDOCRINE: DKA after insulin noncompliance and possible gastroenteritis from food  - Anion gap closed, beta hydroxy-butyrate 0.2  - f/u endocrine recs  - Lantus 7u qHS for now    #SKIN/LINES  - peripheral line - no ulcers     FOR FOLLOW UP:  -endocrinology for insulin dosing  -MRCP at 9:30pm (12/19), consent form filled out, NPO 4 hours prior  -restart diet  -f/u QTc  -HD Thursday 62yo F with hx ESRD (MTuThSa), DM type 1 (Medtronic insulin pump), CAD, CHF, TIA, PVD, p.w vomiting, diarrhea, AMS 2/2 DKA now resolved.     #NEUROLOGY: AMS  - AMS resolved, 2/2 metabolic encephalopathy from DKA  - AAOx3    #PULMONOLOGY:  - no acute issues, on RA    #CARDIOVASCULAR: CAD   Prolonged QTc  - metoprolol 50 qd, hydralazine 100 q8hr   -Hypertensive, restart home lisinopril 40mg daily  -QTc 491 (12/19)    #GASTROENTEROLOGY:   a) Chronically dilated intrahepatic biliary duct  -possibly due to s/p cholecystectomy  -MRCP today 9:30pm, NPO 4 hours prior    b) transaminitis  - LFTs trending down, continue to monitor. Possibly 2/2 fatty liver    c) nausea & vomiting: resolved  - 2/2 DKA  - afebrile, no wbc, unlikely infection, off abx    #RENAL / FLUID: ESRD on MTuThSat  - HD tmrw  -continue phos binder    #INFECTIOUS DISEASE  -Possible gastroenteritis vs N/V due to DKA  - CT doesn't show infectious   - monitor off abx  	  #HEMATOLOGY/DVT PPx  - DVT ppx: HSQ    #ENDOCRINE: DKA after insulin noncompliance and possible gastroenteritis from food  - Anion gap closed, beta hydroxy-butyrate 0.2  - f/u endocrine recs  - Lantus 7u qHS for now    #SKIN/LINES  - peripheral line - no ulcers     FOR FOLLOW UP:  -endocrinology for insulin dosing  -MRCP at 9:30pm (12/19), consent form filled out, NPO 4 hours prior  -restart diet  -f/u QTc  -HD Thursday

## 2018-12-19 NOTE — PROGRESS NOTE ADULT - SUBJECTIVE AND OBJECTIVE BOX
Patient is a 63y old  Female who presents with a chief complaint of DKA (19 Dec 2018 20:02)      SUBJECTIVE / OVERNIGHT EVENTS: Comfortable without new complaints.   Review of Systems  chest pain no  palpitations no  sob no  nausea no  headache no    MEDICATIONS  (STANDING):  aspirin enteric coated 81 milliGRAM(s) Oral daily  atorvastatin 20 milliGRAM(s) Oral at bedtime  cinacalcet 60 milliGRAM(s) Oral daily  clopidogrel Tablet 75 milliGRAM(s) Oral daily  dextrose 5%. 1000 milliLiter(s) (50 mL/Hr) IV Continuous <Continuous>  dextrose 50% Injectable 50 milliLiter(s) IV Push every 15 minutes  dextrose 50% Injectable 25 milliLiter(s) IV Push every 15 minutes  DULoxetine 60 milliGRAM(s) Oral daily  heparin  Injectable 5000 Unit(s) SubCutaneous every 12 hours  hydrALAZINE 100 milliGRAM(s) Oral every 8 hours  insulin glargine Injectable (LANTUS) 8 Unit(s) SubCutaneous at bedtime  insulin lispro (HumaLOG) corrective regimen sliding scale   SubCutaneous three times a day before meals  insulin lispro (HumaLOG) corrective regimen sliding scale   SubCutaneous at bedtime  insulin lispro Injectable (HumaLOG) 4 Unit(s) SubCutaneous three times a day before meals  lisinopril 40 milliGRAM(s) Oral daily  metoprolol succinate ER 50 milliGRAM(s) Oral daily  multivitamin 1 Tablet(s) Oral daily  senna 2 Tablet(s) Oral at bedtime  sevelamer hydrochloride 800 milliGRAM(s) Oral three times a day with meals    MEDICATIONS  (PRN):  acetaminophen   Tablet .. 650 milliGRAM(s) Oral every 6 hours PRN Mild Pain (1 - 3), Moderate Pain (4 - 6)  dextrose 40% Gel 15 Gram(s) Oral once PRN Blood Glucose LESS THAN 70 milliGRAM(s)/deciliter  glucagon  Injectable 1 milliGRAM(s) IntraMuscular once PRN Glucose LESS THAN 70 milligrams/deciliter      Vital Signs Last 24 Hrs  T(C): 37 (19 Dec 2018 20:00), Max: 37.2 (19 Dec 2018 04:00)  T(F): 98.6 (19 Dec 2018 20:00), Max: 98.9 (19 Dec 2018 04:00)  HR: 70 (19 Dec 2018 21:00) (69 - 85)  BP: 156/73 (19 Dec 2018 21:00) (121/60 - 173/80)  BP(mean): 105 (19 Dec 2018 21:00) (82 - 116)  RR: 14 (19 Dec 2018 21:00) (11 - 33)  SpO2: 98% (19 Dec 2018 21:00) (94% - 99%)    PHYSICAL EXAM:  GENERAL: NAD, well-developed  HEAD:  Atraumatic, Normocephalic  EYES: EOMI, PERRLA, conjunctiva and sclera clear  NECK: Supple, No JVD  CHEST/LUNG: Clear to auscultation bilaterally; No wheeze  HEART: Regular rate and rhythm; No murmurs, rubs, or gallops  ABDOMEN: Soft, Nontender, Nondistended; Bowel sounds present  EXTREMITIES:  2+ Peripheral Pulses, No clubbing, cyanosis, or edema  PSYCH: AAOx3  NEUROLOGY: non-focal  SKIN: No rashes or lesions    LABS:                        10.5   6.2   )-----------( 236      ( 19 Dec 2018 01:41 )             31.9     12-19    134<L>  |  95<L>  |  8   ----------------------------<  124<H>  4.2   |  24  |  2.43<H>    Ca    7.7<L>      19 Dec 2018 06:27  Phos  2.7     12-19  Mg     1.9     12-19    TPro  5.9<L>  /  Alb  3.6  /  TBili  0.2  /  DBili  x   /  AST  62<H>  /  ALT  40  /  AlkPhos  75  12-19    PT/INR - ( 18 Dec 2018 01:14 )   PT: 11.8 sec;   INR: 1.03 ratio         PTT - ( 18 Dec 2018 01:14 )  PTT:25.9 sec          Culture - Blood (collected 17 Dec 2018 18:21)  Source: .Blood Blood  Preliminary Report (18 Dec 2018 19:01):    No growth to date.    Culture - Blood (collected 17 Dec 2018 18:20)  Source: .Blood Blood  Preliminary Report (18 Dec 2018 19:01):    No growth to date.        RADIOLOGY & ADDITIONAL TESTS:    Imaging Personally Reviewed:    Consultant(s) Notes Reviewed:      Care Discussed with Consultants/Other Providers:

## 2018-12-19 NOTE — DIETITIAN INITIAL EVALUATION ADULT. - ENERGY NEEDS
Ht: 61"  Wt: 118  BMI: 22.4 kg/m2   IBW: 105 (+/-10%)     112% IBW  Edema: none      Skin: no pressure injuries documented

## 2018-12-19 NOTE — DIETITIAN INITIAL EVALUATION ADULT. - PERTINENT MEDS FT
MEDICATIONS  (STANDING):  aspirin enteric coated 81 milliGRAM(s) Oral daily  atorvastatin 20 milliGRAM(s) Oral at bedtime  calcium gluconate IVPB 1 Gram(s) IV Intermittent once  cinacalcet 60 milliGRAM(s) Oral daily  clopidogrel Tablet 75 milliGRAM(s) Oral daily  dextrose 5%. 1000 milliLiter(s) (50 mL/Hr) IV Continuous <Continuous>  dextrose 50% Injectable 50 milliLiter(s) IV Push every 15 minutes  dextrose 50% Injectable 25 milliLiter(s) IV Push every 15 minutes  DULoxetine 60 milliGRAM(s) Oral daily  heparin  Injectable 5000 Unit(s) SubCutaneous every 12 hours  hydrALAZINE 100 milliGRAM(s) Oral every 8 hours  insulin glargine Injectable (LANTUS) 7 Unit(s) SubCutaneous <User Schedule>  insulin lispro (HumaLOG) corrective regimen sliding scale   SubCutaneous three times a day before meals  insulin lispro (HumaLOG) corrective regimen sliding scale   SubCutaneous at bedtime  insulin lispro Injectable (HumaLOG) 4 Unit(s) SubCutaneous three times a day before meals  lisinopril 40 milliGRAM(s) Oral daily  metoprolol succinate ER 50 milliGRAM(s) Oral daily  multivitamin 1 Tablet(s) Oral daily  sevelamer hydrochloride 800 milliGRAM(s) Oral three times a day with meals    MEDICATIONS  (PRN):  acetaminophen   Tablet .. 650 milliGRAM(s) Oral every 6 hours PRN Mild Pain (1 - 3), Moderate Pain (4 - 6)  dextrose 40% Gel 15 Gram(s) Oral once PRN Blood Glucose LESS THAN 70 milliGRAM(s)/deciliter  glucagon  Injectable 1 milliGRAM(s) IntraMuscular once PRN Glucose LESS THAN 70 milligrams/deciliter  ondansetron Injectable 8 milliGRAM(s) IV Push every 8 hours PRN Nausea and/or Vomiting

## 2018-12-19 NOTE — PROGRESS NOTE ADULT - ASSESSMENT
63 year old woman with uncontrolled DM1 c/b ESRD on HD, neuropathy, retinopathy, CAD, CHF, PVD, TIA here with DKA in setting of running out of insulin in insulin pump and recent GI illness. Pt to remain off insulin pump while inpatient and until she follows up with her private endo. Endocrine attending concerned for pt cognitive level and ability to self-manage insulin pump. Social work input as family or private aid may be required to ensure she is getting her insulin. DKA now resovled. Will be NPO at dinner time for MRCP this evening. BG goal (100-200mg/dl) as pt insulin sensitive and high risk for hypoglycemia.

## 2018-12-20 ENCOUNTER — TRANSCRIPTION ENCOUNTER (OUTPATIENT)
Age: 63
End: 2018-12-20

## 2018-12-20 VITALS
RESPIRATION RATE: 12 BRPM | DIASTOLIC BLOOD PRESSURE: 72 MMHG | SYSTOLIC BLOOD PRESSURE: 149 MMHG | OXYGEN SATURATION: 98 % | HEART RATE: 77 BPM

## 2018-12-20 LAB
ALBUMIN SERPL ELPH-MCNC: 3.6 G/DL — SIGNIFICANT CHANGE UP (ref 3.3–5)
ALP SERPL-CCNC: 77 U/L — SIGNIFICANT CHANGE UP (ref 40–120)
ALT FLD-CCNC: 29 U/L — SIGNIFICANT CHANGE UP (ref 10–45)
ANION GAP SERPL CALC-SCNC: 14 MMOL/L — SIGNIFICANT CHANGE UP (ref 5–17)
APTT BLD: 24.1 SEC — LOW (ref 27.5–36.3)
AST SERPL-CCNC: 45 U/L — HIGH (ref 10–40)
BILIRUB SERPL-MCNC: 0.2 MG/DL — SIGNIFICANT CHANGE UP (ref 0.2–1.2)
BUN SERPL-MCNC: 13 MG/DL — SIGNIFICANT CHANGE UP (ref 7–23)
CALCIUM SERPL-MCNC: 7.6 MG/DL — LOW (ref 8.4–10.5)
CHLORIDE SERPL-SCNC: 93 MMOL/L — LOW (ref 96–108)
CO2 SERPL-SCNC: 26 MMOL/L — SIGNIFICANT CHANGE UP (ref 22–31)
CREAT SERPL-MCNC: 3.71 MG/DL — HIGH (ref 0.5–1.3)
GLUCOSE BLDC GLUCOMTR-MCNC: 128 MG/DL — HIGH (ref 70–99)
GLUCOSE BLDC GLUCOMTR-MCNC: 160 MG/DL — HIGH (ref 70–99)
GLUCOSE BLDC GLUCOMTR-MCNC: 96 MG/DL — SIGNIFICANT CHANGE UP (ref 70–99)
GLUCOSE SERPL-MCNC: 194 MG/DL — HIGH (ref 70–99)
INR BLD: 0.99 RATIO — SIGNIFICANT CHANGE UP (ref 0.88–1.16)
MAGNESIUM SERPL-MCNC: 2 MG/DL — SIGNIFICANT CHANGE UP (ref 1.6–2.6)
PHOSPHATE SERPL-MCNC: 3.9 MG/DL — SIGNIFICANT CHANGE UP (ref 2.5–4.5)
POTASSIUM SERPL-MCNC: 4.7 MMOL/L — SIGNIFICANT CHANGE UP (ref 3.5–5.3)
POTASSIUM SERPL-SCNC: 4.7 MMOL/L — SIGNIFICANT CHANGE UP (ref 3.5–5.3)
PROT SERPL-MCNC: 6.3 G/DL — SIGNIFICANT CHANGE UP (ref 6–8.3)
PROTHROM AB SERPL-ACNC: 11.3 SEC — SIGNIFICANT CHANGE UP (ref 10–12.9)
SODIUM SERPL-SCNC: 133 MMOL/L — LOW (ref 135–145)
WBC # BLD: 3.8 K/UL — SIGNIFICANT CHANGE UP (ref 3.8–10.5)
WBC # FLD AUTO: 3.8 K/UL — SIGNIFICANT CHANGE UP (ref 3.8–10.5)

## 2018-12-20 PROCEDURE — 86706 HEP B SURFACE ANTIBODY: CPT

## 2018-12-20 PROCEDURE — 86704 HEP B CORE ANTIBODY TOTAL: CPT

## 2018-12-20 PROCEDURE — 86901 BLOOD TYPING SEROLOGIC RH(D): CPT

## 2018-12-20 PROCEDURE — 82435 ASSAY OF BLOOD CHLORIDE: CPT

## 2018-12-20 PROCEDURE — 82010 KETONE BODYS QUAN: CPT

## 2018-12-20 PROCEDURE — 82947 ASSAY GLUCOSE BLOOD QUANT: CPT

## 2018-12-20 PROCEDURE — 76700 US EXAM ABDOM COMPLETE: CPT

## 2018-12-20 PROCEDURE — 86709 HEPATITIS A IGM ANTIBODY: CPT

## 2018-12-20 PROCEDURE — 83930 ASSAY OF BLOOD OSMOLALITY: CPT

## 2018-12-20 PROCEDURE — 84100 ASSAY OF PHOSPHORUS: CPT

## 2018-12-20 PROCEDURE — 85610 PROTHROMBIN TIME: CPT

## 2018-12-20 PROCEDURE — 93005 ELECTROCARDIOGRAM TRACING: CPT

## 2018-12-20 PROCEDURE — 87340 HEPATITIS B SURFACE AG IA: CPT

## 2018-12-20 PROCEDURE — 86705 HEP B CORE ANTIBODY IGM: CPT

## 2018-12-20 PROCEDURE — 85014 HEMATOCRIT: CPT

## 2018-12-20 PROCEDURE — 12345: CPT | Mod: NC

## 2018-12-20 PROCEDURE — 82330 ASSAY OF CALCIUM: CPT

## 2018-12-20 PROCEDURE — 87633 RESP VIRUS 12-25 TARGETS: CPT

## 2018-12-20 PROCEDURE — 87798 DETECT AGENT NOS DNA AMP: CPT

## 2018-12-20 PROCEDURE — 82803 BLOOD GASES ANY COMBINATION: CPT

## 2018-12-20 PROCEDURE — 82962 GLUCOSE BLOOD TEST: CPT

## 2018-12-20 PROCEDURE — 86900 BLOOD TYPING SEROLOGIC ABO: CPT

## 2018-12-20 PROCEDURE — 71045 X-RAY EXAM CHEST 1 VIEW: CPT

## 2018-12-20 PROCEDURE — 74181 MRI ABDOMEN W/O CONTRAST: CPT

## 2018-12-20 PROCEDURE — 80048 BASIC METABOLIC PNL TOTAL CA: CPT

## 2018-12-20 PROCEDURE — 74176 CT ABD & PELVIS W/O CONTRAST: CPT

## 2018-12-20 PROCEDURE — 99285 EMERGENCY DEPT VISIT HI MDM: CPT | Mod: 25

## 2018-12-20 PROCEDURE — 84295 ASSAY OF SERUM SODIUM: CPT

## 2018-12-20 PROCEDURE — 80053 COMPREHEN METABOLIC PANEL: CPT

## 2018-12-20 PROCEDURE — 83605 ASSAY OF LACTIC ACID: CPT

## 2018-12-20 PROCEDURE — 83690 ASSAY OF LIPASE: CPT

## 2018-12-20 PROCEDURE — 84132 ASSAY OF SERUM POTASSIUM: CPT

## 2018-12-20 PROCEDURE — 87040 BLOOD CULTURE FOR BACTERIA: CPT

## 2018-12-20 PROCEDURE — 85730 THROMBOPLASTIN TIME PARTIAL: CPT

## 2018-12-20 PROCEDURE — 99261: CPT

## 2018-12-20 PROCEDURE — 99233 SBSQ HOSP IP/OBS HIGH 50: CPT

## 2018-12-20 PROCEDURE — 87581 M.PNEUMON DNA AMP PROBE: CPT

## 2018-12-20 PROCEDURE — 99233 SBSQ HOSP IP/OBS HIGH 50: CPT | Mod: GC

## 2018-12-20 PROCEDURE — 87486 CHLMYD PNEUM DNA AMP PROBE: CPT

## 2018-12-20 PROCEDURE — 83735 ASSAY OF MAGNESIUM: CPT

## 2018-12-20 PROCEDURE — 86803 HEPATITIS C AB TEST: CPT

## 2018-12-20 PROCEDURE — 85027 COMPLETE CBC AUTOMATED: CPT

## 2018-12-20 PROCEDURE — 86850 RBC ANTIBODY SCREEN: CPT

## 2018-12-20 RX ORDER — ENOXAPARIN SODIUM 100 MG/ML
8 INJECTION SUBCUTANEOUS
Qty: 1 | Refills: 0 | OUTPATIENT
Start: 2018-12-20

## 2018-12-20 RX ORDER — SENNA PLUS 8.6 MG/1
2 TABLET ORAL
Qty: 60 | Refills: 0 | OUTPATIENT
Start: 2018-12-20

## 2018-12-20 RX ORDER — INSULIN LISPRO 100/ML
3 VIAL (ML) SUBCUTANEOUS
Qty: 1 | Refills: 0 | OUTPATIENT
Start: 2018-12-20

## 2018-12-20 RX ORDER — INSULIN GLARGINE 100 [IU]/ML
8 INJECTION, SOLUTION SUBCUTANEOUS
Qty: 1 | Refills: 0
Start: 2018-12-20

## 2018-12-20 RX ORDER — INSULIN LISPRO 100/ML
3 VIAL (ML) SUBCUTANEOUS
Qty: 0 | Refills: 0 | COMMUNITY

## 2018-12-20 RX ORDER — INSULIN GLARGINE 100 [IU]/ML
8 INJECTION, SOLUTION SUBCUTANEOUS
Qty: 0 | Refills: 0 | COMMUNITY
Start: 2018-12-20

## 2018-12-20 RX ORDER — SENNA PLUS 8.6 MG/1
2 TABLET ORAL
Qty: 0 | Refills: 0 | COMMUNITY
Start: 2018-12-20

## 2018-12-20 RX ORDER — CALCIUM CARBONATE 500(1250)
2 TABLET ORAL EVERY 4 HOURS
Qty: 0 | Refills: 0 | Status: DISCONTINUED | OUTPATIENT
Start: 2018-12-20 | End: 2018-12-20

## 2018-12-20 RX ADMIN — Medication 1 TABLET(S): at 12:17

## 2018-12-20 RX ADMIN — Medication 4 UNIT(S): at 13:55

## 2018-12-20 RX ADMIN — CLOPIDOGREL BISULFATE 75 MILLIGRAM(S): 75 TABLET, FILM COATED ORAL at 12:13

## 2018-12-20 RX ADMIN — Medication 100 MILLIGRAM(S): at 14:46

## 2018-12-20 RX ADMIN — Medication 1: at 18:48

## 2018-12-20 RX ADMIN — Medication 2 TABLET(S): at 12:12

## 2018-12-20 RX ADMIN — HEPARIN SODIUM 5000 UNIT(S): 5000 INJECTION INTRAVENOUS; SUBCUTANEOUS at 06:04

## 2018-12-20 RX ADMIN — Medication 100 MILLIGRAM(S): at 06:04

## 2018-12-20 RX ADMIN — LISINOPRIL 40 MILLIGRAM(S): 2.5 TABLET ORAL at 06:04

## 2018-12-20 RX ADMIN — SEVELAMER CARBONATE 800 MILLIGRAM(S): 2400 POWDER, FOR SUSPENSION ORAL at 08:53

## 2018-12-20 RX ADMIN — Medication 4 UNIT(S): at 08:57

## 2018-12-20 RX ADMIN — OXYCODONE AND ACETAMINOPHEN 1 TABLET(S): 5; 325 TABLET ORAL at 00:00

## 2018-12-20 RX ADMIN — SEVELAMER CARBONATE 800 MILLIGRAM(S): 2400 POWDER, FOR SUSPENSION ORAL at 12:13

## 2018-12-20 RX ADMIN — Medication 50 MILLIGRAM(S): at 06:04

## 2018-12-20 RX ADMIN — Medication 81 MILLIGRAM(S): at 12:13

## 2018-12-20 RX ADMIN — Medication 4 UNIT(S): at 18:49

## 2018-12-20 RX ADMIN — CINACALCET 60 MILLIGRAM(S): 30 TABLET, FILM COATED ORAL at 12:13

## 2018-12-20 NOTE — DISCHARGE NOTE ADULT - MEDICATION SUMMARY - MEDICATIONS TO CHANGE
I will SWITCH the dose or number of times a day I take the medications listed below when I get home from the hospital:    HumaLOG 100 units/mL injectable solution  -- via insulin pump

## 2018-12-20 NOTE — PROGRESS NOTE ADULT - SUBJECTIVE AND OBJECTIVE BOX
Diabetes Follow up note:  Interval Hx:   63 year old female w/uncontrolled T1DM all known microvascular complications, CAD, CHF (EF 50% 10/2018), TIA, PVD, ESRD HD [facility], last HD Sat, presenting w/nausea, vomiting, diarrhea, AMS, found to be in DKA. DKA now resolved on basal/bolus regimen while inpatient w/BG values at goal. Pt w/frequent DKA admissions, discussed w/private endocrinologist via phone earlier, plan to discharge on injections and off insulin pump. Pt seen at bedside receiving HD. Reports feeling well and hoping to go home today. Team planning for discharge. Pt reports knowing how to use insulin pen but needed guidance earlier when injecting w/RD, CDE.     Review of Systems:  General: "I feel fine"  GI: Tolerating POs without any N/V/D/ABD PAIN.  CV: No CP/SOB  ENDO: No S&Sx of hypoglycemia  MEDS:  atorvastatin 20 milliGRAM(s) Oral at bedtime  cinacalcet 60 milliGRAM(s) Oral daily    insulin glargine Injectable (LANTUS) 8 Unit(s) SubCutaneous at bedtime  insulin lispro (HumaLOG) corrective regimen sliding scale   SubCutaneous three times a day before meals  insulin lispro (HumaLOG) corrective regimen sliding scale   SubCutaneous at bedtime  insulin lispro Injectable (HumaLOG) 4 Unit(s) SubCutaneous three times a day before meals      Allergies    No Known Allergies        PE:  General: Female lying in bed. NAD>   Vital Signs Last 24 Hrs  T(C): 36.6 (20 Dec 2018 12:40), Max: 37 (19 Dec 2018 20:00)  T(F): 97.9 (20 Dec 2018 12:40), Max: 98.6 (19 Dec 2018 20:00)  HR: 77 (20 Dec 2018 14:00) (69 - 85)  BP: 145/69 (20 Dec 2018 14:00) (108/56 - 168/79)  BP(mean): 101 (20 Dec 2018 14:00) (78 - 113)  RR: 23 (20 Dec 2018 14:00) (11 - 32)  SpO2: 99% (20 Dec 2018 14:00) (96% - 100%)  CV: S1, S2. NSR on monitor  Resp: CTA b/l. no accessory muscle use.   Abd: Soft, NT,ND,   Extremities: Warm. R arm fistula in place.   Neuro: A&O X3    LABS:    POCT Blood Glucose.: 96 mg/dL (12-20-18 @ 13:14)  POCT Blood Glucose.: 128 mg/dL (12-20-18 @ 08:55)  POCT Blood Glucose.: 152 mg/dL (12-19-18 @ 22:50)  POCT Blood Glucose.: 145 mg/dL (12-19-18 @ 17:18)  POCT Blood Glucose.: 166 mg/dL (12-19-18 @ 12:07)  POCT Blood Glucose.: 135 mg/dL (12-19-18 @ 08:28)  POCT Blood Glucose.: 156 mg/dL (12-19-18 @ 05:01)  POCT Blood Glucose.: 208 mg/dL (12-19-18 @ 04:06)  POCT Blood Glucose.: 224 mg/dL (12-19-18 @ 03:11)  POCT Blood Glucose.: 144 mg/dL (12-18-18 @ 21:05)  POCT Blood Glucose.: 107 mg/dL (12-18-18 @ 18:44)  POCT Blood Glucose.: 94 mg/dL (12-18-18 @ 18:25)  POCT Blood Glucose.: 57 mg/dL (12-18-18 @ 18:09)  POCT Blood Glucose.: 53 mg/dL (12-18-18 @ 17:54)  POCT Blood Glucose.: 157 mg/dL (12-18-18 @ 12:24)  POCT Blood Glucose.: 149 mg/dL (12-18-18 @ 11:16)  POCT Blood Glucose.: 156 mg/dL (12-18-18 @ 10:09)  POCT Blood Glucose.: 454 mg/dL (12-18-18 @ 10:04)  POCT Blood Glucose.: 162 mg/dL (12-18-18 @ 09:09)  POCT Blood Glucose.: 144 mg/dL (12-18-18 @ 07:57)  POCT Blood Glucose.: 151 mg/dL (12-18-18 @ 06:54)  POCT Blood Glucose.: 157 mg/dL (12-18-18 @ 06:05)  POCT Blood Glucose.: 153 mg/dL (12-18-18 @ 04:09)  POCT Blood Glucose.: 157 mg/dL (12-18-18 @ 02:59)  POCT Blood Glucose.: 168 mg/dL (12-18-18 @ 02:08)  POCT Blood Glucose.: 201 mg/dL (12-18-18 @ 00:48)  POCT Blood Glucose.: 196 mg/dL (12-18-18 @ 00:01)  POCT Blood Glucose.: 170 mg/dL (12-17-18 @ 23:05)  POCT Blood Glucose.: 134 mg/dL (12-17-18 @ 22:36)  POCT Blood Glucose.: 133 mg/dL (12-17-18 @ 21:58)  POCT Blood Glucose.: 156 mg/dL (12-17-18 @ 21:01)  POCT Blood Glucose.: 157 mg/dL (12-17-18 @ 20:59)  POCT Blood Glucose.: 339 mg/dL (12-17-18 @ 19:55)  POCT Blood Glucose.: 299 mg/dL (12-17-18 @ 19:50)  POCT Blood Glucose.: 525 mg/dL (12-17-18 @ 18:46)  POCT Blood Glucose.: 565 mg/dL (12-17-18 @ 18:26)  POCT Blood Glucose.: 531 mg/dL (12-17-18 @ 18:25)  POCT Blood Glucose.: 569 mg/dL (12-17-18 @ 17:45)  POCT Blood Glucose.: >600 mg/dL (12-17-18 @ 17:45)  POCT Blood Glucose.: >600 mg/dL (12-17-18 @ 14:45)  POCT Blood Glucose.: >600 mg/dL (12-17-18 @ 14:44)                            10.8   3.8   )-----------( 250      ( 19 Dec 2018 23:48 )             31.1       12-19    133<L>  |  93<L>  |  13  ----------------------------<  194<H>  4.7   |  26  |  3.71<H>    Ca    7.6<L>      19 Dec 2018 23:48  Phos  3.9     12-19  Mg     2.0     12-19    TPro  6.3  /  Alb  3.6  /  TBili  0.2  /  DBili  x   /  AST  45<H>  /  ALT  29  /  AlkPhos  77  12-19        Hemoglobin A1C, Whole Blood: 8.6 % <H> [4.0 - 5.6] (11-16-18 @ 20:14)  Hemoglobin A1C, Whole Blood: 9.1 % <H> [4.0 - 5.6] (10-16-18 @ 08:13)            Contact number: isabel [phone] or [phone]

## 2018-12-20 NOTE — DISCHARGE NOTE ADULT - PATIENT PORTAL LINK FT
You can access the [facility] Portal, offered by [facility], by registering with the following website: [email or web address]

## 2018-12-20 NOTE — DISCHARGE NOTE ADULT - CARE PROVIDER_API CALL
Pina Ley), EndocrinologyMetabDiabetes  [address] [place] [address]  Phone: [phone]  Fax: [phone]

## 2018-12-20 NOTE — PROGRESS NOTE ADULT - ASSESSMENT
64 yo F pt w/PMHX CAD, CHF (EF 50% 10/2018), TIA, PVD, ESRD HD [facility], last HD Sat, presenting w/nausea, vomiting, diarrhea, AMS, found to be in DKA. As per pt and chart notes, pt had Chinese food with her family yesterday, and the entire family then began experiencing dull abdominal pain and diarrhea. Pt endorses dull lower abdominal pain, NBNB vomiting x1, and diarrhea x1. she is now in dka as well as lactic acidosis and hyperkalemia    1- esrd            imp/suggest: ESRD      Hemodialysis Prescription:  	Access: avf  	Dialyzer: revaclear 300  	Blood Flow (mL/Min): 400  	Dialysate Flow (mL/Min): 600  	Target UF (Liters):  2 liter as tolerated   	Treatment Time: 3.5 h  	Potassium:  2k  	Calcium: 2.5  	  YOLANDA    Vitamin D     continue with hd

## 2018-12-20 NOTE — PROGRESS NOTE ADULT - ATTENDING COMMENTS
Patient examined and case reviewed in detail on bedside rounds
Patient examined and case reviewed in detail on bedside rounds    Pt noted to have elevation of PASP by echo     30 second occlusion of AV fistula/graft resulted in reduction of TR pressure gradient from 47mm Hg to 36mm Hg suggesting contribution from AV shunt to pulmonary hypertension     Good flow in through AV fistula/graft documented post procedure    Will discuss with renal
Patient examined and case reviewed in detail on bedside rounds

## 2018-12-20 NOTE — DISCHARGE NOTE ADULT - MEDICATION SUMMARY - MEDICATIONS TO TAKE
I will START or STAY ON the medications listed below when I get home from the hospital:    aspirin 81 mg oral delayed release tablet  -- 1 tab(s) by mouth once a day  -- Indication: For Coronary Artery Disease    oxyCODONE-acetaminophen 5 mg-325 mg oral tablet  -- 1 tab(s) by mouth 1 to 2 times a day, As Needed for severe pain  -- Indication: For Chronic Pain    lisinopril 40 mg oral tablet  -- 1 tab(s) by mouth once a day  -- Indication: For Congestive Heart Failure    DULoxetine 60 mg oral delayed release capsule  -- 1 cap(s) by mouth once a day  -- Indication: For Depression    atorvastatin 20 mg oral tablet  -- 1 tab(s) by mouth once a day (at bedtime)  -- Indication: For Coronary Heart Disease    clopidogrel 75 mg oral tablet  -- 1 tab(s) by mouth once a day  -- Indication: For Coronary Heart Disease    Metoprolol Succinate ER 50 mg oral tablet, extended release  -- 1 tab(s) by mouth once a day  -- Indication: For Congestive Heart Disease    Sensipar 60 mg oral tablet  -- 1 tab(s) by mouth once a day  -- Indication: For ESRD (end stage renal disease) on dialysis    Renvela 800 mg oral tablet  -- 3 tab(s) by mouth  (with meals)  -- Indication: For ESRD (end stage renal disease) on dialysis    hydrALAZINE 25 mg oral tablet  -- 4 tab(s) by mouth every 8 hours  -- Indication: For Essential hypertension    Multiple Vitamins oral tablet  -- 1 tab(s) by mouth once a day  -- Indication: For Nutritional Supplement I will START or STAY ON the medications listed below when I get home from the hospital:    Insulin Pen Needles, 4mm  -- 1 application subcutaneously 4 times a day. ** Use with insulin pen **   -- Indication: For Diabetes mellitus with ESRD (end-stage renal disease)    aspirin 81 mg oral delayed release tablet  -- 1 tab(s) by mouth once a day  -- Indication: For Coronary Artery Disease    oxyCODONE-acetaminophen 5 mg-325 mg oral tablet  -- 1 tab(s) by mouth 1 to 2 times a day, As Needed for severe pain  -- Indication: For Chronic Pain    lisinopril 40 mg oral tablet  -- 1 tab(s) by mouth once a day  -- Indication: For Congestive Heart Failure    DULoxetine 60 mg oral delayed release capsule  -- 1 cap(s) by mouth once a day  -- Indication: For Depression    HumaLOG KwikPen 100 units/mL injectable solution  -- 3 unit(s) injectable 3 times a day (before meals). Titrate Humalog before meals to insulin to carb ratio 1:15. Correction factor 1:50  -- Indication: For Diabetes mellitus with ESRD (end-stage renal disease)    Lantus Solostar Pen 100 units/mL subcutaneous solution  -- 8 unit(s) subcutaneous once a day (at bedtime) . Please dispense 30-day supply.  -- Do not drink alcoholic beverages when taking this medication.  It is very important that you take or use this exactly as directed.  Do not skip doses or discontinue unless directed by your doctor.  Keep in refrigerator.  Do not freeze.    -- Indication: For Diabetes mellitus with ESRD (end-stage renal disease)    atorvastatin 20 mg oral tablet  -- 1 tab(s) by mouth once a day (at bedtime)  -- Indication: For Coronary Heart Disease    clopidogrel 75 mg oral tablet  -- 1 tab(s) by mouth once a day  -- Indication: For Coronary Heart Disease    Metoprolol Succinate ER 50 mg oral tablet, extended release  -- 1 tab(s) by mouth once a day  -- Indication: For Congestive Heart Disease    Sensipar 60 mg oral tablet  -- 1 tab(s) by mouth once a day  -- Indication: For ESRD (end stage renal disease) on dialysis    senna oral tablet  -- 2 tab(s) by mouth once a day (at bedtime), As Needed -for constipation   -- Indication: For Bowel regimen     Renvela 800 mg oral tablet  -- 3 tab(s) by mouth  (with meals)  -- Indication: For ESRD (end stage renal disease) on dialysis    hydrALAZINE 25 mg oral tablet  -- 4 tab(s) by mouth every 8 hours  -- Indication: For Essential hypertension    Multiple Vitamins oral tablet  -- 1 tab(s) by mouth once a day  -- Indication: For Nutritional Supplement

## 2018-12-20 NOTE — PROGRESS NOTE ADULT - SUBJECTIVE AND OBJECTIVE BOX
[facility]   [phone]  DIALYSIS NOTE  Chief Complaint: ESRD/Ongoing hemodialysis requirement.     24 hour events/subjective:      states feels better today       ALLERGIES & MEDICATIONS  --------------------------------------------------------------------------------  Allergies    No Known Allergies    Intolerances      Standing Inpatient Medications  aspirin enteric coated 81 milliGRAM(s) Oral daily  atorvastatin 20 milliGRAM(s) Oral at bedtime  cinacalcet 60 milliGRAM(s) Oral daily  clopidogrel Tablet 75 milliGRAM(s) Oral daily  dextrose 5%. 1000 milliLiter(s) IV Continuous <Continuous>  dextrose 50% Injectable 50 milliLiter(s) IV Push every 15 minutes  dextrose 50% Injectable 25 milliLiter(s) IV Push every 15 minutes  DULoxetine 60 milliGRAM(s) Oral daily  heparin  Injectable 5000 Unit(s) SubCutaneous every 12 hours  hydrALAZINE 100 milliGRAM(s) Oral every 8 hours  insulin glargine Injectable (LANTUS) 8 Unit(s) SubCutaneous at bedtime  insulin lispro (HumaLOG) corrective regimen sliding scale   SubCutaneous three times a day before meals  insulin lispro (HumaLOG) corrective regimen sliding scale   SubCutaneous at bedtime  insulin lispro Injectable (HumaLOG) 4 Unit(s) SubCutaneous three times a day before meals  lisinopril 40 milliGRAM(s) Oral daily  metoprolol succinate ER 50 milliGRAM(s) Oral daily  multivitamin 1 Tablet(s) Oral daily  senna 2 Tablet(s) Oral at bedtime  sevelamer hydrochloride 800 milliGRAM(s) Oral three times a day with meals    PRN Inpatient Medications  acetaminophen   Tablet .. 650 milliGRAM(s) Oral every 6 hours PRN  dextrose 40% Gel 15 Gram(s) Oral once PRN  glucagon  Injectable 1 milliGRAM(s) IntraMuscular once PRN      REVIEW OF SYSTEMS  --------------------------------------------------------------------------------  no itching or rash  no fever or chill  no cp or palp   no sob or cough   no N/V/D/ no abd pain   ext no edema+ Left leg  pain         VITALS/PHYSICAL EXAM  --------------------------------------------------------------------------------  T(C): 36.6 (12-20-18 @ 12:40), Max: 37 (12-19-18 @ 20:00)  HR: 77 (12-20-18 @ 14:00) (69 - 85)  BP: 145/69 (12-20-18 @ 14:00) (108/56 - 168/79)  RR: 23 (12-20-18 @ 14:00) (11 - 32)  SpO2: 99% (12-20-18 @ 14:00) (96% - 100%)  Wt(kg): --        12-19-18 @ 07:01  -  12-20-18 @ 07:00  --------------------------------------------------------  IN: 640 mL / OUT: 0 mL / NET: 640 mL    12-20-18 @ 07:01  -  12-20-18 @ 14:58  --------------------------------------------------------  IN: 400 mL / OUT: 0 mL / NET: 400 mL      Physical Exam:  		    	Gen: alert oriented place person and date   	Pulm: Decreased breath sounds b/l bases no rales or ronchi  	CV: RRR, S1/S2  	Abd: +BS, soft, nontender/nondistended  	Extremity: No cyanosis, no edema no clubbing    	    LABS/STUDIES  --------------------------------------------------------------------------------              10.8   3.8   >-----------<  250      [12-19-18 @ 23:48]              31.1     133  |  93  |  13  ----------------------------<  194      [12-19-18 @ 23:48]  4.7   |  26  |  3.71        Ca     7.6     [12-19-18 @ 23:48]      Mg     2.0     [12-19-18 @ 23:48]      Phos  3.9     [12-19-18 @ 23:48]    TPro  6.3  /  Alb  3.6  /  TBili  0.2  /  DBili  x   /  AST  45  /  ALT  29  /  AlkPhos  77  [12-19-18 @ 23:48]    PT/INR: PT 11.3 , INR 0.99       [12-19-18 @ 23:48]  PTT: 24.1       [12-19-18 @ 23:48]

## 2018-12-20 NOTE — PROGRESS NOTE ADULT - ASSESSMENT
62yo F with hx ESRD ([facility]uThSa), DM type 1 (Medtronic insulin pump), CAD, CHF, TIA, PVD, p.w vomiting, diarrhea, AMS 2/2 DKA now resolved.     #NEUROLOGY: AMS  - AMS resolved, 2/2 metabolic encephalopathy from DKA  - AAOx3    #PULMONOLOGY:  - no acute issues, on RA    #CARDIOVASCULAR: CAD   Prolonged QTc  - metoprolol 50 qd, hydralazine 100 q8hr   -Hypertensive, restart home lisinopril 40mg daily  -QTc 491 (12/19)    #GASTROENTEROLOGY:   a) Chronically dilated intrahepatic biliary duct  -possibly due to s/p cholecystectomy  -MRCP today 9:30pm, NPO 4 hours prior    b) transaminitis  - LFTs trending down, continue to monitor. Possibly 2/2 fatty liver    c) nausea & vomiting: resolved  - 2/2 DKA  - afebrile, no wbc, unlikely infection, off abx    #RENAL / FLUID: ESRD on MTuThSat  - HD tmrw  -continue phos binder    #INFECTIOUS DISEASE  -Possible gastroenteritis vs N/V due to DKA  - CT doesn't show infectious   - monitor off abx  	  #HEMATOLOGY/DVT PPx  - DVT ppx: HSQ    #ENDOCRINE: DKA after insulin noncompliance and possible gastroenteritis from food  - Anion gap closed, beta hydroxy-butyrate 0.2  - f/u endocrine recs  - Lantus 7u qHS for now    #SKIN/LINES  - peripheral line - no ulcers     FOR FOLLOW UP:  -endocrinology for insulin dosing  -MRCP at 9:30pm (12/19), consent form filled out, NPO 4 hours prior  -restart diet  -f/u QTc  -HD Thursday 64yo F with hx ESRD (MTuThSa), DM type 1 (Medtronic insulin pump), CAD, CHF, TIA, PVD, p.w vomiting, diarrhea, AMS 2/2 DKA now resolved.     #NEUROLOGY: AMS  - AMS resolved, 2/2 metabolic encephalopathy from DKA  - AAOx3    #PULMONOLOGY:  - no acute issues, on RA    #CARDIOVASCULAR: CAD   Prolonged QTc  -metoprolol 50 qd, hydralazine 100 q8hr   -Hypertensive, restart home lisinopril 40mg daily  -QTc 491 (12/19)    #GASTROENTEROLOGY:   a) Chronically dilated intrahepatic biliary duct  -possibly due to s/p cholecystectomy  -f/u with MRCP    b) transaminitis  - LFTs trending down, continue to monitor. Possibly 2/2 fatty liver  - follow-up with MRCP result     c) nausea & vomiting: resolved  - 2/2 DKA  - afebrile, no wbc, unlikely infection, off abx    #RENAL / FLUID: ESRD on MTuThSat  - HD tmrw  - continue phos binder    #INFECTIOUS DISEASE  - Possible gastroenteritis vs N/V due to DKA  - CT doesn't show infectious   - monitor off abx  	  #HEMATOLOGY/DVT PPx  - DVT ppx: HSQ    #ENDOCRINE: DKA after insulin noncompliance and possible gastroenteritis from food  - Anion gap closed   - f/u endocrine recs  - Lantus 8u qHS for now    #SKIN/LINES  - peripheral line - no ulcers     FOR FOLLOW UP:  -endocrinology for insulin dosing  -MRCP   -f/u QTc  -HD Thursday

## 2018-12-20 NOTE — PROVIDER CONTACT NOTE (OTHER) - RECOMMENDATIONS
Primary RN (Niki) advised she needs to have pt practice insulin pen until pt able to demonstrate adequately without coaching

## 2018-12-20 NOTE — DISCHARGE NOTE ADULT - HOSPITAL COURSE
62 yo F pt w/ PMHX CAD, CHF (EF 50% 10/2018), TIA, PVD, ESRD HD MTThSat, last HD Sat, DM type 1 (Medtronic insulin pump) presenting w/nausea, vomiting, diarrhea, AMS, found to be in DKA. As per pt and chart notes, pt had Chinese food with her family yesterday, and the entire family then began experiencing dull abdominal pain and diarrhea. Pt endorses dull lower abdominal pain, NBNB vomiting x1, and diarrhea x1. As per ED, pt was found minimally responsive, obtunded on couch, FS showing very high BG (no number given) so family brought her in. Denies F/C, CP/SOB. Pt is anuric. Of note pt frequently hospitalized for DKA - last hospitalization earlier this month. Has medtronic insulin pump basal rate 1.8u/hr.   Patient transferred to MICU for DKA and continued on insulin gtts and dextrose. AMS resolved and glucose and anion gap improved. Pt was on 1units/hr until noon 12/18, switched to 4U NPH and given lunch. Pt has no complaints. VSS. AG closed, beta hydroxy-butyrate 0.2. Patient is currently ambulating independently in unit and tolerating regular diet. Endocrinology recommending transition to basal-bolus insulin with Lantus 8 units qHS and Humalog 4 units premeal with JADEN.  Patient is examined and deemed ready to be discharged and will followup with endocrinology, primary care, and gastroenterologist at outpatient.

## 2018-12-20 NOTE — PROGRESS NOTE ADULT - PROVIDER SPECIALTY LIST ADULT
Cardiology
Cardiology
Endocrinology
Internal Medicine
Internal Medicine
MICU
MICU
Nephrology
MICU
Internal Medicine
Endocrinology

## 2018-12-20 NOTE — PROGRESS NOTE ADULT - SUBJECTIVE AND OBJECTIVE BOX
Patient is a 63y old  Female who presents with a chief complaint of DKA (20 Dec 2018 14:58)      SUBJECTIVE / OVERNIGHT EVENTS: Comfortable without new complaints.   Review of Systems  chest pain no  palpitations no  sob no  nausea no  headache no    MEDICATIONS  (STANDING):  aspirin enteric coated 81 milliGRAM(s) Oral daily  atorvastatin 20 milliGRAM(s) Oral at bedtime  cinacalcet 60 milliGRAM(s) Oral daily  clopidogrel Tablet 75 milliGRAM(s) Oral daily  dextrose 5%. 1000 milliLiter(s) (50 mL/Hr) IV Continuous <Continuous>  dextrose 50% Injectable 50 milliLiter(s) IV Push every 15 minutes  dextrose 50% Injectable 25 milliLiter(s) IV Push every 15 minutes  DULoxetine 60 milliGRAM(s) Oral daily  heparin  Injectable 5000 Unit(s) SubCutaneous every 12 hours  hydrALAZINE 100 milliGRAM(s) Oral every 8 hours  insulin glargine Injectable (LANTUS) 8 Unit(s) SubCutaneous at bedtime  insulin lispro (HumaLOG) corrective regimen sliding scale   SubCutaneous three times a day before meals  insulin lispro (HumaLOG) corrective regimen sliding scale   SubCutaneous at bedtime  insulin lispro Injectable (HumaLOG) 4 Unit(s) SubCutaneous three times a day before meals  lisinopril 40 milliGRAM(s) Oral daily  metoprolol succinate ER 50 milliGRAM(s) Oral daily  multivitamin 1 Tablet(s) Oral daily  senna 2 Tablet(s) Oral at bedtime  sevelamer hydrochloride 800 milliGRAM(s) Oral three times a day with meals    MEDICATIONS  (PRN):  acetaminophen   Tablet .. 650 milliGRAM(s) Oral every 6 hours PRN Mild Pain (1 - 3), Moderate Pain (4 - 6)  dextrose 40% Gel 15 Gram(s) Oral once PRN Blood Glucose LESS THAN 70 milliGRAM(s)/deciliter  glucagon  Injectable 1 milliGRAM(s) IntraMuscular once PRN Glucose LESS THAN 70 milligrams/deciliter      Vital Signs Last 24 Hrs  T(C): 36.8 (20 Dec 2018 16:10), Max: 37 (19 Dec 2018 20:00)  T(F): 98.3 (20 Dec 2018 16:10), Max: 98.6 (19 Dec 2018 20:00)  HR: 77 (20 Dec 2018 16:10) (69 - 80)  BP: 139/63 (20 Dec 2018 16:10) (108/56 - 168/79)  BP(mean): 94 (20 Dec 2018 16:10) (78 - 113)  RR: 13 (20 Dec 2018 16:10) (11 - 32)  SpO2: 98% (20 Dec 2018 16:10) (96% - 100%)    PHYSICAL EXAM:  GENERAL: NAD, well-developed  HEAD:  Atraumatic, Normocephalic  EYES: EOMI, PERRLA, conjunctiva and sclera clear  NECK: Supple, No JVD  CHEST/LUNG: Clear to auscultation bilaterally; No wheeze  HEART: Regular rate and rhythm; No murmurs, rubs, or gallops  ABDOMEN: Soft, Nontender, Nondistended; Bowel sounds present  EXTREMITIES:  2+ Peripheral Pulses, No clubbing, cyanosis, or edema  PSYCH: AAOx3  NEUROLOGY: non-focal  SKIN: No rashes or lesions    LABS:                        10.8   3.8   )-----------( 250      ( 19 Dec 2018 23:48 )             31.1     12-19    133<L>  |  93<L>  |  13  ----------------------------<  194<H>  4.7   |  26  |  3.71<H>    Ca    7.6<L>      19 Dec 2018 23:48  Phos  3.9     12-19  Mg     2.0     12-19    TPro  6.3  /  Alb  3.6  /  TBili  0.2  /  DBili  x   /  AST  45<H>  /  ALT  29  /  AlkPhos  77  12-19    PT/INR - ( 19 Dec 2018 23:48 )   PT: 11.3 sec;   INR: 0.99 ratio         PTT - ( 19 Dec 2018 23:48 )  PTT:24.1 sec          Culture - Blood (collected 17 Dec 2018 18:21)  Source: .Blood Blood  Preliminary Report (18 Dec 2018 19:01):    No growth to date.    Culture - Blood (collected 17 Dec 2018 18:20)  Source: .Blood Blood  Preliminary Report (18 Dec 2018 19:01):    No growth to date.        RADIOLOGY & ADDITIONAL TESTS:    Imaging Personally Reviewed:  < from: MR MRCP No Cont (12.19.18 @ 22:40) >  IMPRESSION:     Limited noncontrast study.    Normal liver morphology. Hepatic and splenic iron deposition, present   dating back to 2013. No discrete lesion on this noncontrast study.    Mild intrahepatic biliary ductal dilatation, overall improved since 2016.   Proximal CBD is not well assessed on current study. However, normal     < end of copied text >    Consultant(s) Notes Reviewed:      Care Discussed with Consultants/Other Providers:

## 2018-12-20 NOTE — PROGRESS NOTE ADULT - SUBJECTIVE AND OBJECTIVE BOX
MICU PROGRESS NOTE    Joesph Dietz MD  PGY-1  Extension [phone]    CHIEF COMPLAINT:    Interval events / Subjective:    REVIEW OF SYSTEMS:  General: denies fever, chills, fatigue  HENT: denies nasal congestion, sore throat, ear pain, hearing loss  Eyes: denies visual changes  Neck: denies neck pain, swelling, stiffness  CV: denies chest pain, palpitations  Resp: denies difficulty breathing, cough  GI: denies nausea, vomiting, diarrhea, abdominal pain, constipation  Urinary: denies pain on urination change  MSK: denies joint and muscle pain  Neuro: denies headaches, lightheadedness  Skin: denies rashes     OBJECTIVE:  ICU Vital Signs Last 24 Hrs  T(C): 36.7 (20 Dec 2018 04:00), Max: 37 (19 Dec 2018 20:00)  T(F): 98 (20 Dec 2018 04:00), Max: 98.6 (19 Dec 2018 20:00)  HR: 78 (20 Dec 2018 06:00) (69 - 85)  BP: 138/66 (20 Dec 2018 06:00) (114/59 - 173/80)  BP(mean): 95 (20 Dec 2018 06:00) (80 - 114)  ABP: --  ABP(mean): --  RR: 17 (20 Dec 2018 06:00) (11 - 33)  SpO2: 98% (20 Dec 2018 06:00) (96% - 99%)        12-18 @ 07:01  -  12-19 @ 07:00  --------------------------------------------------------  IN: 992.5 mL / OUT: 1500 mL / NET: -507.5 mL    12-19 @ 07:01  -  12-20 @ 06:39  --------------------------------------------------------  IN: 590 mL / OUT: 0 mL / NET: 590 mL      CAPILLARY BLOOD GLUCOSE      POCT Blood Glucose.: 152 mg/dL (19 Dec 2018 22:50)      PHYSICAL EXAM:  General:   HEENT:   Lymph Nodes:  Neck:   Respiratory:   Cardiovascular:   Abdomen:   Extremities:   Skin:   Neurological:  Psychiatry:    LINES:    HOSPITAL MEDICATIONS:  Standing Meds:  aspirin enteric coated 81 milliGRAM(s) Oral daily  atorvastatin 20 milliGRAM(s) Oral at bedtime  cinacalcet 60 milliGRAM(s) Oral daily  clopidogrel Tablet 75 milliGRAM(s) Oral daily  dextrose 5%. 1000 milliLiter(s) IV Continuous <Continuous>  dextrose 50% Injectable 50 milliLiter(s) IV Push every 15 minutes  dextrose 50% Injectable 25 milliLiter(s) IV Push every 15 minutes  DULoxetine 60 milliGRAM(s) Oral daily  heparin  Injectable 5000 Unit(s) SubCutaneous every 12 hours  hydrALAZINE 100 milliGRAM(s) Oral every 8 hours  insulin glargine Injectable (LANTUS) 8 Unit(s) SubCutaneous at bedtime  insulin lispro (HumaLOG) corrective regimen sliding scale   SubCutaneous three times a day before meals  insulin lispro (HumaLOG) corrective regimen sliding scale   SubCutaneous at bedtime  insulin lispro Injectable (HumaLOG) 4 Unit(s) SubCutaneous three times a day before meals  lisinopril 40 milliGRAM(s) Oral daily  metoprolol succinate ER 50 milliGRAM(s) Oral daily  multivitamin 1 Tablet(s) Oral daily  senna 2 Tablet(s) Oral at bedtime  sevelamer hydrochloride 800 milliGRAM(s) Oral three times a day with meals      PRN Meds:  acetaminophen   Tablet .. 650 milliGRAM(s) Oral every 6 hours PRN  dextrose 40% Gel 15 Gram(s) Oral once PRN  glucagon  Injectable 1 milliGRAM(s) IntraMuscular once PRN      LABS:                        10.8   3.8   )-----------( 250      ( 19 Dec 2018 23:48 )             31.1     Hgb Trend: 10.8<--, 10.5<--, 9.9<--, 9.5<--, 10.8<--  12-19    133<L>  |  93<L>  |  13  ----------------------------<  194<H>  4.7   |  26  |  3.71<H>    Ca    7.6<L>      19 Dec 2018 23:48  Phos  3.9     12-19  Mg     2.0     12-19    TPro  6.3  /  Alb  3.6  /  TBili  0.2  /  DBili  x   /  AST  45<H>  /  ALT  29  /  AlkPhos  77  12-19    Creatinine Trend: 3.71<--, 2.43<--, 2.16<--, 3.14<--, 2.88<--, 2.63<--  PT/INR - ( 19 Dec 2018 23:48 )   PT: 11.3 sec;   INR: 0.99 ratio         PTT - ( 19 Dec 2018 23:48 )  PTT:24.1 sec      Venous Blood Gas:  12-19 @ 06:22  7.42/48/40/30/71  VBG Lactate: 1.9  Venous Blood Gas:  12-18 @ 10:09  7.44/47/35/31/60  VBG Lactate: 1.5      MICROBIOLOGY:     Culture - Blood (collected 17 Dec 2018 18:21)  Source: .Blood Blood  Preliminary Report (18 Dec 2018 19:01):    No growth to date.    Culture - Blood (collected 17 Dec 2018 18:20)  Source: .Blood Blood  Preliminary Report (18 Dec 2018 19:01):    No growth to date.      RADIOLOGY:  [ ] Reviewed and interpreted by me    EKG: MICU PROGRESS NOTE    Joesph Dietz MD  PGY-1  Extension [phone]    CHIEF COMPLAINT: DKA     Interval events / Subjective:  MRCP done, no acute events overnight.     REVIEW OF SYSTEMS:  General: denies fever, chills, fatigue  HENT: denies nasal congestion, sore throat, ear pain, hearing loss  Eyes: denies visual changes  Neck: denies neck pain, swelling, stiffness  CV: denies chest pain, palpitations  Resp: denies difficulty breathing, cough  GI: denies nausea, vomiting, diarrhea, abdominal pain, constipation  Urinary: denies pain on urination change  MSK: denies joint and muscle pain  Neuro: denies headaches, lightheadedness  Skin: denies rashes     OBJECTIVE:  ICU Vital Signs Last 24 Hrs  T(C): 36.7 (20 Dec 2018 04:00), Max: 37 (19 Dec 2018 20:00)  T(F): 98 (20 Dec 2018 04:00), Max: 98.6 (19 Dec 2018 20:00)  HR: 78 (20 Dec 2018 06:00) (69 - 85)  BP: 138/66 (20 Dec 2018 06:00) (114/59 - 173/80)  BP(mean): 95 (20 Dec 2018 06:00) (80 - 114)  ABP: --  ABP(mean): --  RR: 17 (20 Dec 2018 06:00) (11 - 33)  SpO2: 98% (20 Dec 2018 06:00) (96% - 99%)        12-18 @ 07:01  -  12-19 @ 07:00  --------------------------------------------------------  IN: 992.5 mL / OUT: 1500 mL / NET: -507.5 mL    12-19 @ 07:01  -  12-20 @ 06:39  --------------------------------------------------------  IN: 590 mL / OUT: 0 mL / NET: 590 mL      CAPILLARY BLOOD GLUCOSE      POCT Blood Glucose.: 152 mg/dL (19 Dec 2018 22:50)      PHYSICAL EXAM:  General: NAD, good hygiene, well developed  HENT: Atraumatic, PERRLA, no conjunctivae injection  Neck: normal ROM and trachea midline   Cardiovascular: RRR, S1&2, no M or R, radial pulses equal and b/l  Respiratory: LLL crackles, decreased breath sounds on right   Abdominal:  soft and non-tender non distended, neg CVA tenderness   Extremities: +1 pitting edema of the legs/feet, DP/PT equal b/l  Skin: warm, well perfused  Neurologic: nonfocal, AAOx3  Psych: normal mood and affect     LINES: [facility]    HOSPITAL MEDICATIONS:  Standing Meds:  aspirin enteric coated 81 milliGRAM(s) Oral daily  atorvastatin 20 milliGRAM(s) Oral at bedtime  cinacalcet 60 milliGRAM(s) Oral daily  clopidogrel Tablet 75 milliGRAM(s) Oral daily  dextrose 5%. 1000 milliLiter(s) IV Continuous <Continuous>  dextrose 50% Injectable 50 milliLiter(s) IV Push every 15 minutes  dextrose 50% Injectable 25 milliLiter(s) IV Push every 15 minutes  DULoxetine 60 milliGRAM(s) Oral daily  heparin  Injectable 5000 Unit(s) SubCutaneous every 12 hours  hydrALAZINE 100 milliGRAM(s) Oral every 8 hours  insulin glargine Injectable (LANTUS) 8 Unit(s) SubCutaneous at bedtime  insulin lispro (HumaLOG) corrective regimen sliding scale   SubCutaneous three times a day before meals  insulin lispro (HumaLOG) corrective regimen sliding scale   SubCutaneous at bedtime  insulin lispro Injectable (HumaLOG) 4 Unit(s) SubCutaneous three times a day before meals  lisinopril 40 milliGRAM(s) Oral daily  metoprolol succinate ER 50 milliGRAM(s) Oral daily  multivitamin 1 Tablet(s) Oral daily  senna 2 Tablet(s) Oral at bedtime  sevelamer hydrochloride 800 milliGRAM(s) Oral three times a day with meals      PRN Meds:  acetaminophen   Tablet .. 650 milliGRAM(s) Oral every 6 hours PRN  dextrose 40% Gel 15 Gram(s) Oral once PRN  glucagon  Injectable 1 milliGRAM(s) IntraMuscular once PRN      LABS:                        10.8   3.8   )-----------( 250      ( 19 Dec 2018 23:48 )             31.1     Hgb Trend: 10.8<--, 10.5<--, 9.9<--, 9.5<--, 10.8<--  12-19    133<L>  |  93<L>  |  13  ----------------------------<  194<H>  4.7   |  26  |  3.71<H>    Ca    7.6<L>      19 Dec 2018 23:48  Phos  3.9     12-19  Mg     2.0     12-19    TPro  6.3  /  Alb  3.6  /  TBili  0.2  /  DBili  x   /  AST  45<H>  /  ALT  29  /  AlkPhos  77  12-19    Creatinine Trend: 3.71<--, 2.43<--, 2.16<--, 3.14<--, 2.88<--, 2.63<--  PT/INR - ( 19 Dec 2018 23:48 )   PT: 11.3 sec;   INR: 0.99 ratio         PTT - ( 19 Dec 2018 23:48 )  PTT:24.1 sec      Venous Blood Gas:  12-19 @ 06:22  7.42/48/40/30/71  VBG Lactate: 1.9  Venous Blood Gas:  12-18 @ 10:09  7.44/47/35/31/60  VBG Lactate: 1.5      MICROBIOLOGY:     Culture - Blood (collected 17 Dec 2018 18:21)  Source: .Blood Blood  Preliminary Report (18 Dec 2018 19:01):    No growth to date.    Culture - Blood (collected 17 Dec 2018 18:20)  Source: .Blood Blood  Preliminary Report (18 Dec 2018 19:01):    No growth to date.      RADIOLOGY:  [ ] Reviewed and interpreted by me    EKG:

## 2018-12-20 NOTE — PROGRESS NOTE ADULT - ASSESSMENT
63 year old woman with uncontrolled DM1 c/b ESRD on HD, neuropathy, retinopathy, CAD, CHF, PVD, TIA here with DKA in setting of running out of insulin in insulin pump and recent GI illness. Pt to remain off insulin pump while inpatient and until she follows up with her private endo as discussed w/her private endocrinologist Dr Pina Ley. Pt able to demonstrate teachback w/regards to insulin pen teaching. Pt will need family assistance w/reminding patient to take basal insulin daily as pt displays cognitive difficulties and lack of problem solving at times. Unable to reach family via phone (mailbox full) to discuss discharge plan. Pt able to count carbs and will use an insulin: carb ratio at home to account for her meals. BG goal (100-200mg/dl). (high complexity pt w/high level decision making).     Met with patient and reviewed the following:    -Blood glucose goals  -Glucose monitoring frequency  -Hypoglycemia prevention,detection and treatment  -Insulin(s) action, time of administration and side effects  -Importance of follow up care      Reviewed use of insulin pen.  Pt able to give teach back with minimal assistance

## 2018-12-20 NOTE — PROGRESS NOTE ADULT - PROBLEM SELECTOR PLAN 1
-test BG AC/HS  -c/w Lantus 8 units QHS  -change Humalog 3 units AC meals while inpatient  -c/w Humalog low correction scale AC and Low HS scale  Discharge plan:   pt will need insulin pens for basal insulin and rapid-acting insulin sent to pharmacy.   Lantus Solostar pen (if covered by insurance-if not can use Basaglar or levemir) 8 units QHS  Humalog Kwik Pen I:C ratio of 1:15 w/correction factor 1:50  -Not a candidate for pump therapy at this time given frequent DKA admissions.   -requested social work setup home care visit to reinforce pen teaching and insulin schedule  -f/u outpt Jan 9th 11am w/private endo Dr Pina Ley  >85 minutes spent on face to face education, coordination of care and discharge planning.  discussed plan w/pt, resident, CDE/RN Anabel Isabel,  and private endocrinologist.   pager: [phone]
-test BG AC/HS/2AM  -Can dose Lantus for 8 units tonight  -c/w Humalog 4 units TID w/meals (hold if not eating)  -c/w Humalog low correction scale AC and Low HS scale  -Pt not a candidate for insulin pump at this time unless family can ensure she has assistance w/care. If going home on basal/bolus, will need assistance from family or aid to ensure she is getting insulin at all times.   -Recommend social work involvement for discharge  -discussed w/pt and team  pager: [phone]

## 2018-12-20 NOTE — DISCHARGE NOTE ADULT - PLAN OF CARE
Follow up You have a history of Type 1 diabetes mellitus. You were found to be confused due to elevated levels of sugar in your blood. You were treated with insulin and intravenous fluids (IVF). You were also found to have elevated levels of ketones in your blood which resolved with insulin and IVF. You also had a MRCP done in the hospital, please follow up with your Gastroenterologist and your outpatient endocrinologist, Dr Liana Ley on Jan 9, 11am. follow up You have a history of endstage renal disease and you were dialyzed at the hospital. Please continue your dialysis schedule of Monday, Tuesday, Thursday, and Saturday. Please followup with your nephrologist at outpatient basis.

## 2018-12-20 NOTE — PROGRESS NOTE ADULT - ASSESSMENT
63 f with  DKA resolved.  CAD stable.   HTN control  ESRD- Nephrology follow Dr. Brayan kang HD  LFT abnormal- follow  Observe off antibiotics   Medically stable.  Pierce Viera MD pager [phone]

## 2018-12-20 NOTE — PROVIDER CONTACT NOTE (OTHER) - ASSESSMENT
Pt able to demonstrate adequate return demo 2 times but with a lot of coaching.  Pt unable to demonstrate adequate return-demo of vial and syringe technique.

## 2018-12-20 NOTE — PROVIDER CONTACT NOTE (OTHER) - ACTION/TREATMENT ORDERED:
Primary RN (Niki) to f/u with Primary Care/Endocrine teams if pt unable to demonstrate insulin pen injection technique independently without coaching.

## 2018-12-20 NOTE — DISCHARGE NOTE ADULT - CARE PLAN
Principal Discharge DX:	Type 1 diabetes mellitus with ketoacidosis without coma  Goal:	Follow up  Assessment and plan of treatment:	You have a history of Type 1 diabetes mellitus. You were found to be confused due to elevated levels of sugar in your blood. You were treated with insulin and intravenous fluids (IVF). You were also found to have elevated levels of ketones in your blood which resolved with insulin and IVF. You also had a MRCP done in the hospital, please follow up with your Gastroenterologist and your outpatient endocrinologist, Dr Liana Ley on Jan 9, 11am.  Secondary Diagnosis:	ESRD (end stage renal disease) on dialysis  Goal:	follow up  Assessment and plan of treatment:	You have a history of endstage renal disease and you were dialyzed at the hospital. Please continue your dialysis schedule of Monday, Tuesday, Thursday, and Saturday. Please followup with your nephrologist at outpatient basis.

## 2018-12-20 NOTE — PROGRESS NOTE ADULT - PROBLEM SELECTOR PROBLEM 1
Type 1 diabetes mellitus with ketoacidosis without coma
Type 1 diabetes mellitus with ketoacidosis without coma

## 2018-12-20 NOTE — DISCHARGE NOTE ADULT - ADDITIONAL INSTRUCTIONS
Please follow up with your endocrinologist, Dr. Eric Ley on Jan. 9 11am. Please follow up with your primary care doctor within 1 week of discharge.

## 2018-12-20 NOTE — PROGRESS NOTE ADULT - SUBJECTIVE AND OBJECTIVE BOX
Cardiovascular Disease Progress Note    Overnight events: No acute events overnight.  seen and evaluated by endocrine. dka resolved. no cp/sob/pnd/orthopnea  Otherwise review of systems negative    Objective Findings:  T(C): 36.7 (18 @ 04:00), Max: 37 (18 @ 20:00)  HR: 73 (18 @ 07:00) (69 - 85)  BP: 129/65 (18 @ 07:00) (114/59 - 173/80)  RR: 25 (18 @ 07:00) (11 - 33)  SpO2: 99% (18 @ 07:00) (96% - 99%)  Wt(kg): --  Daily     Daily Weight in k.1 (20 Dec 2018 04:00)      Physical Exam:  Gen: NAD  HEENT: EOMI  CV: RRR, normal S1 + S2, no m/r/g  Lungs: CTAB  Abd: soft, non-tender  Ext: No edema    Telemetry:    Laboratory Data:                        10.8   3.8   )-----------( 250      ( 19 Dec 2018 23:48 )             31.1         133<L>  |  93<L>  |  13  ----------------------------<  194<H>  4.7   |  26  |  3.71<H>    Ca    7.6<L>      19 Dec 2018 23:48  Phos  3.9       Mg     2.0         TPro  6.3  /  Alb  3.6  /  TBili  0.2  /  DBili  x   /  AST  45<H>  /  ALT  29  /  AlkPhos  77      PT/INR - ( 19 Dec 2018 23:48 )   PT: 11.3 sec;   INR: 0.99 ratio         PTT - ( 19 Dec 2018 23:48 )  PTT:24.1 sec        Inpatient Medications:  MEDICATIONS  (STANDING):  aspirin enteric coated 81 milliGRAM(s) Oral daily  atorvastatin 20 milliGRAM(s) Oral at bedtime  cinacalcet 60 milliGRAM(s) Oral daily  clopidogrel Tablet 75 milliGRAM(s) Oral daily  dextrose 5%. 1000 milliLiter(s) (50 mL/Hr) IV Continuous <Continuous>  dextrose 50% Injectable 50 milliLiter(s) IV Push every 15 minutes  dextrose 50% Injectable 25 milliLiter(s) IV Push every 15 minutes  DULoxetine 60 milliGRAM(s) Oral daily  heparin  Injectable 5000 Unit(s) SubCutaneous every 12 hours  hydrALAZINE 100 milliGRAM(s) Oral every 8 hours  insulin glargine Injectable (LANTUS) 8 Unit(s) SubCutaneous at bedtime  insulin lispro (HumaLOG) corrective regimen sliding scale   SubCutaneous three times a day before meals  insulin lispro (HumaLOG) corrective regimen sliding scale   SubCutaneous at bedtime  insulin lispro Injectable (HumaLOG) 4 Unit(s) SubCutaneous three times a day before meals  lisinopril 40 milliGRAM(s) Oral daily  metoprolol succinate ER 50 milliGRAM(s) Oral daily  multivitamin 1 Tablet(s) Oral daily  senna 2 Tablet(s) Oral at bedtime  sevelamer hydrochloride 800 milliGRAM(s) Oral three times a day with meals      Assessment:  - DKA in the setting of medication non-compliance and possible sepsis (GI source)  -ischemic cardiomyopathy, cad s/p multiple stents  -esrd on hd  -PAD s/p prior intervention   -remote TIA  -prolonged QTc  -HTN      Recs:  -management of DKA per MICU --> resolved f/u endo regarding management of insulin pump vs sucutaneous insulin  -no true ischemic sx. ekg with nonspecific ST changes. would defer ischemic work up for now. avoid qtc prolonging meds. would repeat ecg now that electrolytes repleted and acute issues resolved  -c/w asa, plavix, statin, bb and hydralazine for ischemic cardiomyopathy and HTN  -dvt ppx        Over 25 minutes spent on total encounter; more than 50% of the visit was spent counseling and/or coordinating care by the attending physician.      Julián Biswas MD   Cardiovascular Disease  [phone]

## 2019-01-11 ENCOUNTER — APPOINTMENT (OUTPATIENT)
Dept: PULMONOLOGY | Facility: CLINIC | Age: 64
End: 2019-01-11
Payer: MEDICARE

## 2019-01-11 VITALS
DIASTOLIC BLOOD PRESSURE: 84 MMHG | RESPIRATION RATE: 15 BRPM | TEMPERATURE: 98.5 F | OXYGEN SATURATION: 95 % | HEART RATE: 107 BPM | SYSTOLIC BLOOD PRESSURE: 140 MMHG

## 2019-01-11 LAB — POCT - HEMOGLOBIN (HGB), QUANTITATIVE, TRANSCUTANEOUS: 8.9

## 2019-01-11 PROCEDURE — 88738 HGB QUANT TRANSCUTANEOUS: CPT

## 2019-01-11 PROCEDURE — 99214 OFFICE O/P EST MOD 30 MIN: CPT | Mod: 25

## 2019-01-11 PROCEDURE — 71046 X-RAY EXAM CHEST 2 VIEWS: CPT

## 2019-01-11 PROCEDURE — 94060 EVALUATION OF WHEEZING: CPT

## 2019-01-11 NOTE — DISCUSSION/SUMMARY
[FreeTextEntry1] : \par At follow-up address the issue of follow-up CAT scan chest\par CT chest completed October 31, 2018\par 9 mm groundglass opacity left upper lobe\par 12 mm groundglass opacity right upper lobe no change from prior scans\par Other groundglass opacities in the right lung with solid appearing nodules at the lung bases were unchanged\par Atrophic left kidney\par High suspicion for an indolent nonaggressive low-grade adenocarcinoma in situ\par Is a high risk patient with due to the severity of her severe underlying congestive heart failure renal insufficiency anemia\par \par We will follow-up CAT scan of the chest April May and address at next visit\par Discussed in detail with patient

## 2019-01-11 NOTE — REVIEW OF SYSTEMS
[As Noted in HPI] : as noted in HPI [Edema] : ~T edema was present [Claudication] : intermittent claudication [Diabetes] : diabetes mellitus [Negative] : Neurologic [Chest Discomfort] : no chest discomfort [PND] : no PND [Orthopnea] : no orthopnea [FreeTextEntry9] : CHF ASHD

## 2019-01-11 NOTE — PHYSICAL EXAM
[General Appearance - Well Developed] : well developed [Normal Appearance] : normal appearance [Well Groomed] : well groomed [General Appearance - Well Nourished] : well nourished [No Deformities] : no deformities [General Appearance - In No Acute Distress] : no acute distress [Normal Conjunctiva] : the conjunctiva exhibited no abnormalities [Normal Oropharynx] : normal oropharynx [I] : I [Neck Appearance] : the appearance of the neck was normal [Neck Cervical Mass (___cm)] : no neck mass was observed [Jugular Venous Distention Increased] : there was no jugular-venous distention [Thyroid Diffuse Enlargement] : the thyroid was not enlarged [Heart Rate And Rhythm] : heart rate and rhythm were normal [Heart Sounds] : normal S1 and S2 [Murmurs] : no murmurs present [Respiration, Rhythm And Depth] : normal respiratory rhythm and effort [Exaggerated Use Of Accessory Muscles For Inspiration] : no accessory muscle use [Chest Palpation] : palpation of the chest revealed no abnormalities [Lungs Percussion] : the lungs were normal to percussion [Bowel Sounds] : normal bowel sounds [Abdomen Soft] : soft [Abdomen Tenderness] : non-tender [Abdomen Mass (___ Cm)] : no abdominal mass palpated [Nail Clubbing] : no clubbing of the fingernails [Cyanosis, Localized] : no localized cyanosis [Petechial Hemorrhages (___cm)] : no petechial hemorrhages [] : no ischemic changes [Deep Tendon Reflexes (DTR)] : deep tendon reflexes were 2+ and symmetric [Sensation] : the sensory exam was normal to light touch and pinprick [No Focal Deficits] : no focal deficits [Oriented To Time, Place, And Person] : oriented to person, place, and time [FreeTextEntry1] : distant BS with mild prolonged expiratory phase

## 2019-01-11 NOTE — PROCEDURE
[FreeTextEntry1] : \par End-tidal CO2 34\par \par Chest x-ray PA lateral projection January 11, 2019\par Cardiomegaly\par Left pleural effusion pleural fibrosis thickening\par Right lung upper zone rib fracture\par No evidence of active CHF\par No Tasha B-lines cephalization\par No dominant pulmonary nodules appreciated\par \par PFT January 11, 2019\par Moderate reduction in flow rates with an obstructive impairment\par Lung volumes consistent with air trapping with RV/TLC ratio 152% of predicted.\par Diffusion 68% of predicted with a loss of mild functioning alveolar capillary units\par Chronically low hemoglobin noted today at 8.9

## 2019-01-11 NOTE — HISTORY OF PRESENT ILLNESS
[None] : ~He/She~ has no significant interval events [Difficulty Breathing During Exertion] : stable dyspnea on exertion [Feelings Of Weakness On Exertion] : stable exercise intolerance [Cough] : denies coughing [Wheezing] : denies wheezing [Regional Soft Tissue Swelling Both Lower Extremities] : stable lower extremity edema [Chest Pain Or Discomfort] : denies chest pain [Fever] : denies fever [Former] : is a former smoker [___ Year Quit] : ~He/She~ quit smoking in [unfilled] [Wt Gain ___ Lbs] : no recent weight gain [Wt Loss ___ Lbs] : no recent weight loss [Oxygen] : the patient uses no supplemental oxygen

## 2019-01-16 ENCOUNTER — EMERGENCY (EMERGENCY)
Facility: HOSPITAL | Age: 64
LOS: 1 days | Discharge: HOME | End: 2019-01-16
Attending: EMERGENCY MEDICINE
Payer: MEDICARE

## 2019-01-16 VITALS
RESPIRATION RATE: 16 BRPM | OXYGEN SATURATION: 97 % | SYSTOLIC BLOOD PRESSURE: 130 MMHG | DIASTOLIC BLOOD PRESSURE: 72 MMHG | HEART RATE: 73 BPM | TEMPERATURE: 98 F

## 2019-01-16 VITALS
RESPIRATION RATE: 22 BRPM | DIASTOLIC BLOOD PRESSURE: 73 MMHG | SYSTOLIC BLOOD PRESSURE: 155 MMHG | OXYGEN SATURATION: 98 % | HEART RATE: 85 BPM

## 2019-01-16 DIAGNOSIS — Z98.89 OTHER SPECIFIED POSTPROCEDURAL STATES: Chronic | ICD-10-CM

## 2019-01-16 LAB
ALBUMIN SERPL ELPH-MCNC: 4 G/DL — SIGNIFICANT CHANGE UP (ref 3.3–5)
ALP SERPL-CCNC: 70 U/L — SIGNIFICANT CHANGE UP (ref 40–120)
ALT FLD-CCNC: 19 U/L — SIGNIFICANT CHANGE UP (ref 10–45)
ANION GAP SERPL CALC-SCNC: 15 MMOL/L — SIGNIFICANT CHANGE UP (ref 5–17)
AST SERPL-CCNC: 21 U/L — SIGNIFICANT CHANGE UP (ref 10–40)
BASE EXCESS BLDV CALC-SCNC: 6.3 MMOL/L — HIGH (ref -2–2)
BASOPHILS # BLD AUTO: 0 K/UL — SIGNIFICANT CHANGE UP (ref 0–0.2)
BASOPHILS NFR BLD AUTO: 0.4 % — SIGNIFICANT CHANGE UP (ref 0–2)
BILIRUB SERPL-MCNC: 0.3 MG/DL — SIGNIFICANT CHANGE UP (ref 0.2–1.2)
BUN SERPL-MCNC: 26 MG/DL — HIGH (ref 7–23)
CA-I SERPL-SCNC: 0.96 MMOL/L — LOW (ref 1.12–1.3)
CALCIUM SERPL-MCNC: 8.2 MG/DL — LOW (ref 8.4–10.5)
CHLORIDE BLDV-SCNC: 95 MMOL/L — LOW (ref 96–108)
CHLORIDE SERPL-SCNC: 97 MMOL/L — SIGNIFICANT CHANGE UP (ref 96–108)
CO2 BLDV-SCNC: 32 MMOL/L — HIGH (ref 22–30)
CO2 SERPL-SCNC: 28 MMOL/L — SIGNIFICANT CHANGE UP (ref 22–31)
CREAT SERPL-MCNC: 5.18 MG/DL — HIGH (ref 0.5–1.3)
EOSINOPHIL # BLD AUTO: 0 K/UL — SIGNIFICANT CHANGE UP (ref 0–0.5)
EOSINOPHIL NFR BLD AUTO: 0 % — SIGNIFICANT CHANGE UP (ref 0–6)
GAS PNL BLDV: 137 MMOL/L — SIGNIFICANT CHANGE UP (ref 136–145)
GAS PNL BLDV: SIGNIFICANT CHANGE UP
GLUCOSE BLDV-MCNC: 122 MG/DL — HIGH (ref 70–99)
GLUCOSE SERPL-MCNC: 135 MG/DL — HIGH (ref 70–99)
HCO3 BLDV-SCNC: 31 MMOL/L — HIGH (ref 21–29)
HCT VFR BLD CALC: 29.5 % — LOW (ref 34.5–45)
HCT VFR BLDA CALC: 29 % — LOW (ref 39–50)
HGB BLD CALC-MCNC: 9.4 G/DL — LOW (ref 11.5–15.5)
HGB BLD-MCNC: 9.9 G/DL — LOW (ref 11.5–15.5)
LACTATE BLDV-MCNC: 2.2 MMOL/L — HIGH (ref 0.7–2)
LYMPHOCYTES # BLD AUTO: 0.7 K/UL — LOW (ref 1–3.3)
LYMPHOCYTES # BLD AUTO: 13.7 % — SIGNIFICANT CHANGE UP (ref 13–44)
MCHC RBC-ENTMCNC: 33.6 GM/DL — SIGNIFICANT CHANGE UP (ref 32–36)
MCHC RBC-ENTMCNC: 34.5 PG — HIGH (ref 27–34)
MCV RBC AUTO: 103 FL — HIGH (ref 80–100)
MONOCYTES # BLD AUTO: 0.4 K/UL — SIGNIFICANT CHANGE UP (ref 0–0.9)
MONOCYTES NFR BLD AUTO: 7.2 % — SIGNIFICANT CHANGE UP (ref 2–14)
NEUTROPHILS # BLD AUTO: 4.2 K/UL — SIGNIFICANT CHANGE UP (ref 1.8–7.4)
NEUTROPHILS NFR BLD AUTO: 78.7 % — HIGH (ref 43–77)
NT-PROBNP SERPL-SCNC: HIGH PG/ML (ref 0–300)
PCO2 BLDV: 47 MMHG — SIGNIFICANT CHANGE UP (ref 35–50)
PH BLDV: 7.43 — SIGNIFICANT CHANGE UP (ref 7.35–7.45)
PLATELET # BLD AUTO: 245 K/UL — SIGNIFICANT CHANGE UP (ref 150–400)
PO2 BLDV: 39 MMHG — SIGNIFICANT CHANGE UP (ref 25–45)
POTASSIUM BLDV-SCNC: 5.5 MMOL/L — HIGH (ref 3.5–5.3)
POTASSIUM SERPL-MCNC: 5.2 MMOL/L — SIGNIFICANT CHANGE UP (ref 3.5–5.3)
POTASSIUM SERPL-SCNC: 5.2 MMOL/L — SIGNIFICANT CHANGE UP (ref 3.5–5.3)
PROT SERPL-MCNC: 6.6 G/DL — SIGNIFICANT CHANGE UP (ref 6–8.3)
RBC # BLD: 2.87 M/UL — LOW (ref 3.8–5.2)
RBC # FLD: 13.8 % — SIGNIFICANT CHANGE UP (ref 10.3–14.5)
SAO2 % BLDV: 68 % — SIGNIFICANT CHANGE UP (ref 67–88)
SODIUM SERPL-SCNC: 140 MMOL/L — SIGNIFICANT CHANGE UP (ref 135–145)
WBC # BLD: 5.4 K/UL — SIGNIFICANT CHANGE UP (ref 3.8–10.5)
WBC # FLD AUTO: 5.4 K/UL — SIGNIFICANT CHANGE UP (ref 3.8–10.5)

## 2019-01-16 PROCEDURE — 71045 X-RAY EXAM CHEST 1 VIEW: CPT | Mod: 26

## 2019-01-16 PROCEDURE — 99284 EMERGENCY DEPT VISIT MOD MDM: CPT | Mod: GC

## 2019-01-16 NOTE — ED PROVIDER NOTE - PHYSICAL EXAMINATION
Physical Exam:   GEN: adult F who is in no acute or respiratory  distress, AAOx3  HENT: NCAT, MMM, no stridor  EYES: PERRLA, EOMI  RESP: + minimal bibasilar fine rales, no respiratory distress  CV: normal rate and regular rhythm, S1 S2  ABD: soft and NTND  EXT: No edema, No bony deformity of extremities  SKIN: No skin breaks, skin color normal for race  NEURO: CN grossly intact, No focal motor or sensory deficits.   ~ Storm Bennett MD

## 2019-01-16 NOTE — ED ADULT NURSE REASSESSMENT NOTE - NS ED NURSE REASSESS COMMENT FT1
Pt received from RN Shankar in [place]. Pt AOx3 breathing even unlabored spontaneously, lungs CTA, S1S2 heart sounds heard, NAD, awaits MD dispo.

## 2019-01-16 NOTE — ED PROVIDER NOTE - PROGRESS NOTE DETAILS
Storm Bennett MD: patient had significant improvement from her initial presenting complaint. She has no SOB and was observed ambulating without dyspnea. She has normal oxygenation and around 97% on room. patient will have her dialysis performed tomorrow and feels comfortable with going home and is very adamant to go home today. Stable for discharge restrictor precautions and close follow

## 2019-01-16 NOTE — ED PROVIDER NOTE - NS ED ROS FT
CONST: no fevers, no chills  CV: no chest pain, no palpitations     RESP: + shortness of breath, no cough  ABD: no abdominal pain, no vomiting     MSK: no muscle pain, no joint swelling     NEURO: no headache, no focal weakness or loss of sensation     SKIN:  no rash, no lacerations.   ~ Storm Bennett MD

## 2019-01-16 NOTE — ED ADULT NURSE NOTE - OBJECTIVE STATEMENT
64 y/o F, A&Ox4, enters ED by EMS w/ c/o SOB. Pt. receives dialysis [id number]. (fistula in L. arm). Pt. reports she missed her dialysis on Monday. Pt. reports she woke up this AM feeling SOB. Pt. presents in no acute respiratory distress. Pt.'s O2 sat 96% on RA. Pt. able to speak in clear, full sentences. Pt. denies chest pain. Pt. also reports fever, but afebrile upon presentation to ED. No cough. Pt. has bilateral lower extremity edema which pt. reports is worse than usual. Lung sounds clear in the upper fields, diminished in the lower fields. No abdominal pain. Abdomen soft, round, nontender. No dysuria/hematuria. Skin warm, dry and intact. Safety and comfort provided. EKG done. Pt. placed on CM - NSR to the 70s. Call bell within reach.

## 2019-01-16 NOTE — ED ADULT NURSE NOTE - INTERVENTIONS DEFINITIONS
[place] to call system/Provide visual cue, wrist band, yellow gown, etc./Call bell, personal items and telephone within reach/Instruct patient to call for assistance

## 2019-01-16 NOTE — ED PROVIDER NOTE - NSFOLLOWUPINSTRUCTIONS_ED_ALL_ED_FT
1) Please follow-up with your Primary Medical Doctor in 2-3 days.  2) Return to the Emergency Department if you experiences: chest pain, shortness of breath, fevers, chills, dizziness, or symptoms that are new or recurrent.  3) Please go to Dialysis on 1/17/19

## 2019-01-16 NOTE — ED PROVIDER NOTE - OBJECTIVE STATEMENT
Storm Bennett MD: 63-year-old female with history of CHF with ejection fraction of 50%, CAD, end stage renal disease on hemodialysis M-T-Th-Sat, with her last session yesterday however missed one session on Monday, who was brought in by EMS for shortness of breath. The patient was standing in her kitchen when she started feeling shortness of breath with gradual onset around 8 AM. However patient had self resolution of her dyspnea while in the ED. patient denied any chest pain, dizziness, syncope, fever, chills. Denies any shortness of breath now with either a walking or with laying flat

## 2019-01-16 NOTE — ED PROVIDER NOTE - MEDICAL DECISION MAKING DETAILS
Storm Bennett MD: dyspnea that spontaneously resolved, and the setting of the patient with end-stage renal disease, with mild vascular congestion on chest x-ray. However patient is asymptomatic and wants to go home and have her dialysis tomorrow. Labs at her baseline. No anginal symptoms Storm Bennett MD: dyspnea that spontaneously resolved, and the setting of the patient with end-stage renal disease, with mild vascular congestion on chest x-ray. However patient is asymptomatic and wants to go home and have her dialysis tomorrow. Labs at her baseline. No anginal symptoms  Lalita: 63 year old female with CHF, HD TTHS, here for sob. no cp, no n/v. will get labs, cxr, ekg, reassess

## 2019-01-16 NOTE — ED ADULT NURSE REASSESSMENT NOTE - NS ED NURSE REASSESS COMMENT FT1
Pt remains in ED, NAD, states " I want to go home, I don't want to be here anymore." MD Storm Bennett notified.

## 2019-01-19 ENCOUNTER — INPATIENT (INPATIENT)
Facility: HOSPITAL | Age: 64
LOS: 5 days | Discharge: HOME | DRG: 637 | End: 2019-01-25
Attending: INTERNAL MEDICINE | Admitting: SPECIALIST
Payer: MEDICARE

## 2019-01-19 VITALS
DIASTOLIC BLOOD PRESSURE: 56 MMHG | TEMPERATURE: 100 F | OXYGEN SATURATION: 100 % | HEIGHT: 64 IN | WEIGHT: 119.93 LBS | SYSTOLIC BLOOD PRESSURE: 96 MMHG | HEART RATE: 77 BPM | RESPIRATION RATE: 20 BRPM

## 2019-01-19 DIAGNOSIS — E13.10 OTHER SPECIFIED DIABETES MELLITUS WITH KETOACIDOSIS WITHOUT COMA: ICD-10-CM

## 2019-01-19 DIAGNOSIS — Z98.89 OTHER SPECIFIED POSTPROCEDURAL STATES: Chronic | ICD-10-CM

## 2019-01-19 LAB
ALBUMIN SERPL ELPH-MCNC: 4.4 G/DL — SIGNIFICANT CHANGE UP (ref 3.3–5)
ALP SERPL-CCNC: 83 U/L — SIGNIFICANT CHANGE UP (ref 40–120)
ALT FLD-CCNC: 34 U/L — SIGNIFICANT CHANGE UP (ref 10–45)
ANION GAP SERPL CALC-SCNC: 28 MMOL/L — HIGH (ref 5–17)
ANION GAP SERPL CALC-SCNC: 33 MMOL/L — HIGH (ref 5–17)
APTT BLD: 27.5 SEC — SIGNIFICANT CHANGE UP (ref 27.5–36.3)
AST SERPL-CCNC: 36 U/L — SIGNIFICANT CHANGE UP (ref 10–40)
BASE EXCESS BLDV CALC-SCNC: -0.7 MMOL/L — SIGNIFICANT CHANGE UP (ref -2–2)
BASOPHILS # BLD AUTO: 0 K/UL — SIGNIFICANT CHANGE UP (ref 0–0.2)
BASOPHILS NFR BLD AUTO: 0.2 % — SIGNIFICANT CHANGE UP (ref 0–2)
BILIRUB SERPL-MCNC: 0.5 MG/DL — SIGNIFICANT CHANGE UP (ref 0.2–1.2)
BUN SERPL-MCNC: 24 MG/DL — HIGH (ref 7–23)
BUN SERPL-MCNC: 28 MG/DL — HIGH (ref 7–23)
CA-I SERPL-SCNC: 1.08 MMOL/L — LOW (ref 1.12–1.3)
CALCIUM SERPL-MCNC: 8.3 MG/DL — LOW (ref 8.4–10.5)
CALCIUM SERPL-MCNC: 8.9 MG/DL — SIGNIFICANT CHANGE UP (ref 8.4–10.5)
CHLORIDE BLDV-SCNC: 93 MMOL/L — LOW (ref 96–108)
CHLORIDE SERPL-SCNC: 88 MMOL/L — LOW (ref 96–108)
CHLORIDE SERPL-SCNC: 88 MMOL/L — LOW (ref 96–108)
CO2 BLDV-SCNC: 26 MMOL/L — SIGNIFICANT CHANGE UP (ref 22–30)
CO2 SERPL-SCNC: 15 MMOL/L — LOW (ref 22–31)
CO2 SERPL-SCNC: 21 MMOL/L — LOW (ref 22–31)
CREAT SERPL-MCNC: 3.08 MG/DL — HIGH (ref 0.5–1.3)
CREAT SERPL-MCNC: 3.34 MG/DL — HIGH (ref 0.5–1.3)
EOSINOPHIL # BLD AUTO: 0.1 K/UL — SIGNIFICANT CHANGE UP (ref 0–0.5)
EOSINOPHIL NFR BLD AUTO: 1.2 % — SIGNIFICANT CHANGE UP (ref 0–6)
GAS PNL BLDV: 134 MMOL/L — LOW (ref 136–145)
GAS PNL BLDV: SIGNIFICANT CHANGE UP
GLUCOSE BLDC GLUCOMTR-MCNC: 215 MG/DL — HIGH (ref 70–99)
GLUCOSE BLDC GLUCOMTR-MCNC: 240 MG/DL — HIGH (ref 70–99)
GLUCOSE BLDC GLUCOMTR-MCNC: 315 MG/DL — HIGH (ref 70–99)
GLUCOSE BLDC GLUCOMTR-MCNC: 321 MG/DL — HIGH (ref 70–99)
GLUCOSE BLDV-MCNC: 259 MG/DL — HIGH (ref 70–99)
GLUCOSE SERPL-MCNC: 269 MG/DL — HIGH (ref 70–99)
GLUCOSE SERPL-MCNC: 313 MG/DL — HIGH (ref 70–99)
HCO3 BLDV-SCNC: 24 MMOL/L — SIGNIFICANT CHANGE UP (ref 21–29)
HCT VFR BLD CALC: 31.1 % — LOW (ref 34.5–45)
HCT VFR BLDA CALC: 30 % — LOW (ref 39–50)
HGB BLD CALC-MCNC: 9.8 G/DL — LOW (ref 11.5–15.5)
HGB BLD-MCNC: 10.3 G/DL — LOW (ref 11.5–15.5)
HOROWITZ INDEX BLDV+IHG-RTO: SIGNIFICANT CHANGE UP
INR BLD: 1.21 RATIO — HIGH (ref 0.88–1.16)
LACTATE BLDV-MCNC: 4.2 MMOL/L — CRITICAL HIGH (ref 0.7–2)
LYMPHOCYTES # BLD AUTO: 0.6 K/UL — LOW (ref 1–3.3)
LYMPHOCYTES # BLD AUTO: 7.7 % — LOW (ref 13–44)
MCHC RBC-ENTMCNC: 33.2 GM/DL — SIGNIFICANT CHANGE UP (ref 32–36)
MCHC RBC-ENTMCNC: 34.2 PG — HIGH (ref 27–34)
MCV RBC AUTO: 103 FL — HIGH (ref 80–100)
MONOCYTES # BLD AUTO: 0.6 K/UL — SIGNIFICANT CHANGE UP (ref 0–0.9)
MONOCYTES NFR BLD AUTO: 7.1 % — SIGNIFICANT CHANGE UP (ref 2–14)
NEUTROPHILS # BLD AUTO: 6.9 K/UL — SIGNIFICANT CHANGE UP (ref 1.8–7.4)
NEUTROPHILS NFR BLD AUTO: 83.8 % — HIGH (ref 43–77)
NT-PROBNP SERPL-SCNC: HIGH PG/ML (ref 0–300)
PCO2 BLDV: 45 MMHG — SIGNIFICANT CHANGE UP (ref 35–50)
PH BLDV: 7.36 — SIGNIFICANT CHANGE UP (ref 7.35–7.45)
PLATELET # BLD AUTO: 295 K/UL — SIGNIFICANT CHANGE UP (ref 150–400)
PO2 BLDV: 32 MMHG — SIGNIFICANT CHANGE UP (ref 25–45)
POTASSIUM BLDV-SCNC: 4.9 MMOL/L — SIGNIFICANT CHANGE UP (ref 3.5–5.3)
POTASSIUM SERPL-MCNC: 5.2 MMOL/L — SIGNIFICANT CHANGE UP (ref 3.5–5.3)
POTASSIUM SERPL-MCNC: 5.5 MMOL/L — HIGH (ref 3.5–5.3)
POTASSIUM SERPL-SCNC: 5.2 MMOL/L — SIGNIFICANT CHANGE UP (ref 3.5–5.3)
POTASSIUM SERPL-SCNC: 5.5 MMOL/L — HIGH (ref 3.5–5.3)
PROT SERPL-MCNC: 6.9 G/DL — SIGNIFICANT CHANGE UP (ref 6–8.3)
PROTHROM AB SERPL-ACNC: 13.9 SEC — HIGH (ref 10–12.9)
RAPID RVP RESULT: SIGNIFICANT CHANGE UP
RBC # BLD: 3.02 M/UL — LOW (ref 3.8–5.2)
RBC # FLD: 14.3 % — SIGNIFICANT CHANGE UP (ref 10.3–14.5)
SAO2 % BLDV: 43 % — LOW (ref 67–88)
SODIUM SERPL-SCNC: 136 MMOL/L — SIGNIFICANT CHANGE UP (ref 135–145)
SODIUM SERPL-SCNC: 137 MMOL/L — SIGNIFICANT CHANGE UP (ref 135–145)
TROPONIN T, HIGH SENSITIVITY RESULT: 201 NG/L — HIGH (ref 0–51)
WBC # BLD: 8.2 K/UL — SIGNIFICANT CHANGE UP (ref 3.8–10.5)
WBC # FLD AUTO: 8.2 K/UL — SIGNIFICANT CHANGE UP (ref 3.8–10.5)

## 2019-01-19 PROCEDURE — 99291 CRITICAL CARE FIRST HOUR: CPT | Mod: 25

## 2019-01-19 PROCEDURE — 93010 ELECTROCARDIOGRAM REPORT: CPT | Mod: 59

## 2019-01-19 PROCEDURE — 36556 INSERT NON-TUNNEL CV CATH: CPT

## 2019-01-19 PROCEDURE — 71045 X-RAY EXAM CHEST 1 VIEW: CPT | Mod: 26

## 2019-01-19 RX ORDER — HEPARIN SODIUM 5000 [USP'U]/ML
5000 INJECTION INTRAVENOUS; SUBCUTANEOUS EVERY 8 HOURS
Qty: 0 | Refills: 0 | Status: DISCONTINUED | OUTPATIENT
Start: 2019-01-19 | End: 2019-01-25

## 2019-01-19 RX ORDER — VANCOMYCIN HCL 1 G
1000 VIAL (EA) INTRAVENOUS ONCE
Qty: 0 | Refills: 0 | Status: COMPLETED | OUTPATIENT
Start: 2019-01-19 | End: 2019-01-19

## 2019-01-19 RX ORDER — ALBUMIN HUMAN 25 %
250 VIAL (ML) INTRAVENOUS ONCE
Qty: 0 | Refills: 0 | Status: COMPLETED | OUTPATIENT
Start: 2019-01-19 | End: 2019-01-19

## 2019-01-19 RX ORDER — PIPERACILLIN AND TAZOBACTAM 4; .5 G/20ML; G/20ML
3.38 INJECTION, POWDER, LYOPHILIZED, FOR SOLUTION INTRAVENOUS ONCE
Qty: 0 | Refills: 0 | Status: COMPLETED | OUTPATIENT
Start: 2019-01-19 | End: 2019-01-19

## 2019-01-19 RX ORDER — NOREPINEPHRINE BITARTRATE/D5W 8 MG/250ML
0.05 PLASTIC BAG, INJECTION (ML) INTRAVENOUS
Qty: 8 | Refills: 0 | Status: DISCONTINUED | OUTPATIENT
Start: 2019-01-19 | End: 2019-01-20

## 2019-01-19 RX ORDER — ACETAMINOPHEN 500 MG
1000 TABLET ORAL ONCE
Qty: 0 | Refills: 0 | Status: COMPLETED | OUTPATIENT
Start: 2019-01-19 | End: 2019-01-19

## 2019-01-19 RX ORDER — OXYCODONE AND ACETAMINOPHEN 5; 325 MG/1; MG/1
1 TABLET ORAL ONCE
Qty: 0 | Refills: 0 | Status: DISCONTINUED | OUTPATIENT
Start: 2019-01-19 | End: 2019-01-19

## 2019-01-19 RX ORDER — PIPERACILLIN AND TAZOBACTAM 4; .5 G/20ML; G/20ML
3.38 INJECTION, POWDER, LYOPHILIZED, FOR SOLUTION INTRAVENOUS EVERY 12 HOURS
Qty: 0 | Refills: 0 | Status: DISCONTINUED | OUTPATIENT
Start: 2019-01-20 | End: 2019-01-20

## 2019-01-19 RX ORDER — ACETAMINOPHEN 500 MG
1000 TABLET ORAL ONCE
Qty: 0 | Refills: 0 | Status: DISCONTINUED | OUTPATIENT
Start: 2019-01-19 | End: 2019-01-20

## 2019-01-19 RX ORDER — DEXTROSE 10 % IN WATER 10 %
1000 INTRAVENOUS SOLUTION INTRAVENOUS
Qty: 0 | Refills: 0 | Status: DISCONTINUED | OUTPATIENT
Start: 2019-01-19 | End: 2019-01-20

## 2019-01-19 RX ORDER — CEFEPIME 1 G/1
2000 INJECTION, POWDER, FOR SOLUTION INTRAMUSCULAR; INTRAVENOUS EVERY 12 HOURS
Qty: 0 | Refills: 0 | Status: DISCONTINUED | OUTPATIENT
Start: 2019-01-19 | End: 2019-01-19

## 2019-01-19 RX ORDER — SODIUM CHLORIDE 9 MG/ML
500 INJECTION INTRAMUSCULAR; INTRAVENOUS; SUBCUTANEOUS ONCE
Qty: 0 | Refills: 0 | Status: COMPLETED | OUTPATIENT
Start: 2019-01-19 | End: 2019-01-19

## 2019-01-19 RX ORDER — INSULIN HUMAN 100 [IU]/ML
7 INJECTION, SOLUTION SUBCUTANEOUS
Qty: 100 | Refills: 0 | Status: DISCONTINUED | OUTPATIENT
Start: 2019-01-19 | End: 2019-01-20

## 2019-01-19 RX ORDER — ONDANSETRON 8 MG/1
4 TABLET, FILM COATED ORAL ONCE
Qty: 0 | Refills: 0 | Status: COMPLETED | OUTPATIENT
Start: 2019-01-19 | End: 2019-01-19

## 2019-01-19 RX ORDER — ONDANSETRON 8 MG/1
4 TABLET, FILM COATED ORAL ONCE
Qty: 0 | Refills: 0 | Status: DISCONTINUED | OUTPATIENT
Start: 2019-01-19 | End: 2019-01-20

## 2019-01-19 RX ADMIN — Medication 400 MILLIGRAM(S): at 20:20

## 2019-01-19 RX ADMIN — CEFEPIME 100 MILLIGRAM(S): 1 INJECTION, POWDER, FOR SOLUTION INTRAMUSCULAR; INTRAVENOUS at 18:23

## 2019-01-19 RX ADMIN — ONDANSETRON 4 MILLIGRAM(S): 8 TABLET, FILM COATED ORAL at 17:02

## 2019-01-19 RX ADMIN — INSULIN HUMAN 5 UNIT(S)/HR: 100 INJECTION, SOLUTION SUBCUTANEOUS at 19:03

## 2019-01-19 RX ADMIN — OXYCODONE AND ACETAMINOPHEN 1 TABLET(S): 5; 325 TABLET ORAL at 23:27

## 2019-01-19 RX ADMIN — Medication 1000 MILLIGRAM(S): at 20:35

## 2019-01-19 RX ADMIN — Medication 5.1 MICROGRAM(S)/KG/MIN: at 19:07

## 2019-01-19 RX ADMIN — PIPERACILLIN AND TAZOBACTAM 200 GRAM(S): 4; .5 INJECTION, POWDER, LYOPHILIZED, FOR SOLUTION INTRAVENOUS at 22:12

## 2019-01-19 RX ADMIN — SODIUM CHLORIDE 500 MILLILITER(S): 9 INJECTION INTRAMUSCULAR; INTRAVENOUS; SUBCUTANEOUS at 17:32

## 2019-01-19 RX ADMIN — Medication 250 MILLIGRAM(S): at 18:23

## 2019-01-19 RX ADMIN — SODIUM CHLORIDE 500 MILLILITER(S): 9 INJECTION INTRAMUSCULAR; INTRAVENOUS; SUBCUTANEOUS at 16:32

## 2019-01-19 RX ADMIN — Medication 5.1 MICROGRAM(S)/KG/MIN: at 20:44

## 2019-01-19 RX ADMIN — Medication 125 MILLILITER(S): at 20:47

## 2019-01-19 RX ADMIN — OXYCODONE AND ACETAMINOPHEN 1 TABLET(S): 5; 325 TABLET ORAL at 22:27

## 2019-01-19 NOTE — H&P ADULT - ASSESSMENT
Patient is a 64 yo F with ESRD (on HD M/Tu/Th/Sa), type 1 DM, CAD, HFrEF (EF 50% on TTE from 10/18), TIA, and PVD, who presented from HD with complaints of N/V and hyperglycemia, admitted to MICU for DKA, Septic Shock, and Respiratory Failure     #Neuro  - patient lethargic, likely 2/2 DKA  - continue to monitor     #Resp  Pulmonary Edema  - patient with diffuse B-lines on POCUS, and coughing up pink  frothy sputum  - likely with pulmonary edema 2/2 fluid overload vs CHF exacerbation   - will monitor respiratory status closely  - will avoid BiPAP as patient is experiencing N/V    #CV  HFrEF  - patient with EF of 50% on TTE from October 2018  - currently with fluid overload  - will call renal in the AM for ultrafiltrate     Hypotension  - patient hypotensive to 70s/40s in the ED  - likely distributive shock 2/2 to infectious etiology  - plan as below  - will give stat dose of Albumin 5% 250cc  - continue Levo gtt for goal MAP of >65     #GI  N/V  - likely 2/2 DKA  - will improve with glycemic control  - continue to monitor     #Renal  ESRD  - patient requiring HD 4x/week  - underwent long session of dialysis on day of presentation  - electrolytes all currently WNL  - will consult Renal in the AM for UF   - monitor electrolytes closely     #Heme/Onc  - stable    #Endo  DKA  - patient with type 1 diabetes  - did not take her Lantus the evening prior to presentation  - BHB on admission of 6.3  - continue insulin gtt at this time  - monitor BMP q6  - endo consulted by ED. will f/u recs    #ID  Septic Shock  - patient meeting sepsis criteria with leukopenia and tachypnea  - will start broad spectrum abx with Vanc (dosed by level) and Zosyn  - f/u blood cx, RVP, UA, and procalcitonin    #DVT ppx  - HSQ

## 2019-01-19 NOTE — H&P ADULT - ATTENDING COMMENTS
63F Hx DM, ESRD, CAD, HFrEF 50%, PVD, frequent hospitalizations presented from HD Center with  N/V and Hyperglycemia, pulmonary congestion, Septic Shock.   - Admitted to MICU for DKA, Septic Shock, Lactic Acidosis and Respiratory Failure   - Gentle IV Hydration for HFrEF and Insulin gtt protocol   - Pressor support and ESRD-HD support plan   - Septic W/U and empiric ABx coverage pending C & S  - Demand Ischemia F/U with EKG and Kyra     Critical Care Time =45 Min

## 2019-01-19 NOTE — ED ADULT NURSE NOTE - NSIMPLEMENTINTERV_GEN_ALL_ED
Implemented All Fall Risk Interventions:  [place] to call system. Call bell, personal items and telephone within reach. Instruct patient to call for assistance. Room bathroom lighting operational. Non-slip footwear when patient is off stretcher. Physically safe environment: no spills, clutter or unnecessary equipment. Stretcher in lowest position, wheels locked, appropriate side rails in place. Provide visual cue, wrist band, yellow gown, etc. Monitor gait and stability. Monitor for mental status changes and reorient to person, place, and time. Review medications for side effects contributing to fall risk. Reinforce activity limits and safety measures with patient and family.

## 2019-01-19 NOTE — ED PROVIDER NOTE - ATTENDING CONTRIBUTION TO CARE
63F, pmh chf (EF 50%), CAD, ESRD on Mon Tues Thurs Sat dialysis, sent in by ambulance from dialysis with hyperglycemia, ams, and sob. pt typically receives 2 1/2 hrs, today received 3 1/2 hrs dialysis due to concern for volume overload. pt also ran out of lantus last night, last took two days ago. pt somnolent but arousable, denies any other complaints. pt was seen at [facility] ED 3 days ago for SOB, d/c'd home.  states she may have had fever yesterday but not sure. does seem to be coughing more recently. no recent n/v/d, complaints of pain.    PE: Chronically ill appearing, somnolent but easily arousable, oriented to person, place, situation, NCAT, MMM, Trachea midline, Normal conjunctiva, lungs CTAB, S1/S2 RRR, Normal perfusion, 2+ radial pulses bilat, Abdomen Soft, NTND, No rebound/guarding, No LE edema, No deformity of extremities, No rashes,  No focal motor or sensory deficits.     BP soft, but is afebrile. Ddx includes but not limited to metabolic derangement vs DKA vs infectious etiology vs excessive fluid removal 2/2 dialysis. Check CBC eval for anemia, cmp eval for metabolic derangement. Check vbg/lactate, beta-hydroxybutyrate. CXR. RVP. Give cautious IVF. - Jose Hinton MD 63F, pmh chf (EF 50%), CAD, ESRD on Mon Tues Thurs Sat dialysis, sent in by ambulance from dialysis with hyperglycemia, ams, and sob. pt typically receives 2 1/2 hrs, today received 3 1/2 hrs dialysis due to concern for volume overload. pt also ran out of lantus last night, last took two days ago. pt somnolent but arousable, denies any other complaints. pt was seen at [facility] ED 3 days ago for SOB, d/c'd home.  states she may have had fever yesterday but not sure. does seem to be coughing more recently. no recent n/v/d, complaints of pain.    PE: Chronically ill appearing, somnolent but easily arousable, oriented to person, place, situation, NCAT, MMM, Trachea midline, Normal conjunctiva, lungs CTAB, S1/S2 RRR, Normal perfusion, 2+ radial pulses bilat, Abdomen Soft, NTND, No rebound/guarding, No LE edema, No deformity of extremities, No rashes,  No focal motor or sensory deficits.     BP soft, but is afebrile. Ddx includes but not limited to metabolic derangement vs DKA vs infectious etiology vs excessive fluid removal 2/2 dialysis. Check CBC eval for anemia, cmp eval for metabolic derangement. Check vbg/lactate, beta-hydroxybutyrate. CXR. RVP. Give cautious IVF. Hypotension likely 2/2 excessive fluid removal. Pt afebrile on rectal temp, much lower suspicion of infectious etiology, will hold abx at this time. - Jose Hinton MD

## 2019-01-19 NOTE — H&P ADULT - HISTORY OF PRESENT ILLNESS
Patient is a 64 yo F with ESRD (on HD M/Tu/Th/Sa), type 1 DM, CAD, HFrEF (EF 50% on TTE from 10/18), TIA, and PVD, who presented from HD with complaints of N/V and hyperglycemia. Patient previously on indulin pump, however as per report, patient was transitioned to basal/bolus regimen. Patient ran out of her lantus yesterday and therefore did not take it. Patient presented to HD today, where she was complainking of SOB. She was therefore given extra fluid removal today, and was also found to be hyperglycemic at that time. Patient continued to feel SOB after HD, and was brought to the ED at that time.    In the ED, patient found to be tachypneic (20), and hypotensive (as low as 70s/40s). Labs significant for leukocytopenia (WBC 3), hyperglycemia (269), elevated trops 201, with Patient is a 64 yo F with ESRD (on HD M/Tu/Th/Sa), type 1 DM, CAD, HFrEF (EF 50% on TTE from 10/18), TIA, and PVD, who presented from HD with complaints of N/V and hyperglycemia. Patient previously on indulin pump, however as per report, patient was transitioned to basal/bolus regimen. Patient ran out of her lantus yesterday and therefore did not take it. Patient presented to HD today, where she was complainking of SOB. She was therefore given extra fluid removal today, and was also found to be hyperglycemic at that time. Patient continued to feel SOB after HD, and was brought to the ED at that time.    In the ED, patient found to be tachypneic (20), and hypotensive (as low as 70s/40s). Labs significant for leukocytopenia (WBC 3), hyperglycemia (269), elevated trops 201, with BHB of 6.3. Patient given 500cc NS bolus. Central line placed, and patient started on insulin gtt and pressors. Admitted to MICU for further care

## 2019-01-19 NOTE — H&P ADULT - NSHPLABSRESULTS_GEN_ALL_CORE
(01-19 @ 15:52)                      10.3  8.2 )-----------( 295                 31.1    Neutrophils = 6.9 (83.8%)  Lymphocytes = 0.6 (7.7%)  Eosinophils = 0.1 (1.2%)  Basophils = 0.0 (0.2%)  Monocytes = 0.6 (7.1%)  Bands = --%    01-19    137  |  88<L>  |  24<H>  ----------------------------<  269<H>  5.2   |  21<L>  |  3.08<H>    Ca    8.9      19 Jan 2019 15:52  Phos  4.3     01-19  Mg     2.3     01-19    TPro  6.9  /  Alb  4.4  /  TBili  0.5  /  DBili  x   /  AST  36  /  ALT  34  /  AlkPhos  83  01-19    ( 19 Jan 2019 15:52 )   PT: 13.9 sec;   INR: 1.21 ratio;       PTT:27.5 sec      RVP:    Venous Blood Gas:  01-19 @ 15:52  7.36/45/32/24/43  VBG Lactate: 4.2        Tox:         < from: Xray Chest 1 View- PORTABLE-Urgent (01.19.19 @ 16:26) >    ******PRELIMINARY REPORT******    ******PRELIMINARY REPORT******              INTERPRETATION:  clear lungs    < end of copied text >

## 2019-01-19 NOTE — H&P ADULT - NSHPPHYSICALEXAM_GEN_ALL_CORE
Vital Signs Last 24 Hrs  T(C): 37.4 (19 Jan 2019 16:35), Max: 37.7 (19 Jan 2019 15:01)  T(F): 99.3 (19 Jan 2019 16:35), Max: 99.8 (19 Jan 2019 15:01)  HR: 71 (19 Jan 2019 17:55) (71 - 78)  BP: 95/45 (19 Jan 2019 17:55) (74/40 - 96/56)  BP(mean): --  RR: 20 (19 Jan 2019 17:55) (19 - 20)  SpO2: 99% (19 Jan 2019 17:55) (96% - 100%)      PHYSICAL EXAM:  GENERAL: NAD, well-developed  HEAD:  Atraumatic, Normocephalic  EYES: EOMI, PERRLA, conjunctiva and sclera clear  NECK: Supple, No JVD  CHEST/LUNG: Clear to auscultation bilaterally; No wheeze  HEART: Regular rate and rhythm; No murmurs, rubs, or gallops  ABDOMEN: Soft, Nontender, Nondistended; Bowel sounds present  EXTREMITIES:  2+ Peripheral Pulses, No clubbing, cyanosis, or edema  PSYCH: AAOx3  NEUROLOGY: non-focal  SKIN: No rashes or lesions Vital Signs Last 24 Hrs  T(C): 37.4 (19 Jan 2019 16:35), Max: 37.7 (19 Jan 2019 15:01)  T(F): 99.3 (19 Jan 2019 16:35), Max: 99.8 (19 Jan 2019 15:01)  HR: 71 (19 Jan 2019 17:55) (71 - 78)  BP: 95/45 (19 Jan 2019 17:55) (74/40 - 96/56)  BP(mean): --  RR: 20 (19 Jan 2019 17:55) (19 - 20)  SpO2: 99% (19 Jan 2019 17:55) (96% - 100%)      PHYSICAL EXAM:  GENERAL: lethargic  HEAD:  Atraumatic, Normocephalic  EYES: EOMI, PERRLA, conjunctiva and sclera clear  NECK: Supple, No JVD  CHEST/LUNG: Clear to auscultation bilaterally; No wheeze  HEART: Regular rate and rhythm; 2/6 systolic ejection murmur loudest at the right 2nd intercostal space   ABDOMEN: Soft, Nontender, Nondistended; Bowel sounds present  EXTREMITIES:  2+ Peripheral Pulses3+ b/l LE pitting edema   PSYCH: AAOx3  NEUROLOGY: non-focal

## 2019-01-19 NOTE — ED PROVIDER NOTE - OBJECTIVE STATEMENT
63-year-old female with history of CHF with ejection fraction of 50%, CAD, end stage renal disease on hemodialysis M-T-Th-Sat,, who was brought in by EMS for shortness of breath from dialysis. patient completed 3.75 hours of dialysis but was hyperglycemic and continued to complain of SOB. upon arrival patient unwilling to cooperate with interview. patient is sleepy but alert and following commands. will not answer questions despite multiple attempts, only shakes her head. states she is SOB and does not have any other complaints. 63-year-old female with history of CHF with ejection fraction of 50%, CAD, end stage renal disease on hemodialysis M-T-Th-Sat,, who was brought in by EMS for shortness of breath from dialysis. patient completed 3.75 hours of dialysis but was hyperglycemic and continued to complain of SOB. upon arrival patient unwilling to cooperate with interview. patient is sleepy but alert and following commands. will not answer questions despite multiple attempts, only shakes her head. states she is SOB and does not have any other complaints. when  arrives states she has been very lethargic, had ? pna which she had neg cxr for. was switched from insulin pump to lantus and short acting but ran out of insulin last night.

## 2019-01-19 NOTE — ED ADULT NURSE NOTE - OBJECTIVE STATEMENT
Patient  is  alert  and oriented x3 .  Color is  good  and  skin warm to touch.  She  is  c/o  weakness  and  legs  pain.  Patient  just  came back  from hemodialysis today.  As  per  her  family  member  , she  had  1  extra   hour  of  treatment.  Blood  sugar  remains  elevated.

## 2019-01-19 NOTE — ED PROCEDURE NOTE - CPROC ED INFUS LINE DETAIL1
Ultrasound guidance was used during placement./The location was identified, and the area was draped and prepped./The catheter was placed using sterile technique./The guidewire was recovered./All lumen(s) aspirated and flushed without difficulty.

## 2019-01-19 NOTE — PATIENT PROFILE ADULT - PACKS PER DAY
[address]
Dr. Stacie Patricia I received a [facility]-16 form on this patient stating She had a work injury 11- but I don't see that you saw the patient for a work comp injury none of the visits are labeled work comp can you please let me know if you feel this is a work comp case
0

## 2019-01-19 NOTE — ED ADULT NURSE NOTE - ED STAT RN HANDOFF DETAILS
Patient report received from Mayra CARMONA. Patient awaiting MICU bed. Patient hypotensive 80's systolic, started on Levofed as ordered. Patient on Insulin infusion as ordered for hyperglycemia 299. RN at bedside, safety maintained.

## 2019-01-20 LAB
ALBUMIN SERPL ELPH-MCNC: 3.8 G/DL — SIGNIFICANT CHANGE UP (ref 3.3–5)
ALP SERPL-CCNC: 69 U/L — SIGNIFICANT CHANGE UP (ref 40–120)
ALT FLD-CCNC: 64 U/L — HIGH (ref 10–45)
ANION GAP SERPL CALC-SCNC: 16 MMOL/L — SIGNIFICANT CHANGE UP (ref 5–17)
ANION GAP SERPL CALC-SCNC: 17 MMOL/L — SIGNIFICANT CHANGE UP (ref 5–17)
ANION GAP SERPL CALC-SCNC: 17 MMOL/L — SIGNIFICANT CHANGE UP (ref 5–17)
ANION GAP SERPL CALC-SCNC: 21 MMOL/L — HIGH (ref 5–17)
ANION GAP SERPL CALC-SCNC: 21 MMOL/L — HIGH (ref 5–17)
AST SERPL-CCNC: 95 U/L — HIGH (ref 10–40)
BASOPHILS # BLD AUTO: 0 K/UL — SIGNIFICANT CHANGE UP (ref 0–0.2)
BASOPHILS # BLD AUTO: 0 K/UL — SIGNIFICANT CHANGE UP (ref 0–0.2)
BASOPHILS NFR BLD AUTO: 0.2 % — SIGNIFICANT CHANGE UP (ref 0–2)
BASOPHILS NFR BLD AUTO: 0.3 % — SIGNIFICANT CHANGE UP (ref 0–2)
BILIRUB SERPL-MCNC: 0.3 MG/DL — SIGNIFICANT CHANGE UP (ref 0.2–1.2)
BLD GP AB SCN SERPL QL: NEGATIVE — SIGNIFICANT CHANGE UP
BUN SERPL-MCNC: 32 MG/DL — HIGH (ref 7–23)
BUN SERPL-MCNC: 36 MG/DL — HIGH (ref 7–23)
BUN SERPL-MCNC: 39 MG/DL — HIGH (ref 7–23)
BUN SERPL-MCNC: 43 MG/DL — HIGH (ref 7–23)
BUN SERPL-MCNC: 44 MG/DL — HIGH (ref 7–23)
CALCIUM SERPL-MCNC: 7.5 MG/DL — LOW (ref 8.4–10.5)
CALCIUM SERPL-MCNC: 7.7 MG/DL — LOW (ref 8.4–10.5)
CALCIUM SERPL-MCNC: 7.8 MG/DL — LOW (ref 8.4–10.5)
CALCIUM SERPL-MCNC: 8 MG/DL — LOW (ref 8.4–10.5)
CALCIUM SERPL-MCNC: 8 MG/DL — LOW (ref 8.4–10.5)
CHLORIDE SERPL-SCNC: 89 MMOL/L — LOW (ref 96–108)
CHLORIDE SERPL-SCNC: 91 MMOL/L — LOW (ref 96–108)
CHLORIDE SERPL-SCNC: 93 MMOL/L — LOW (ref 96–108)
CO2 SERPL-SCNC: 24 MMOL/L — SIGNIFICANT CHANGE UP (ref 22–31)
CO2 SERPL-SCNC: 24 MMOL/L — SIGNIFICANT CHANGE UP (ref 22–31)
CO2 SERPL-SCNC: 27 MMOL/L — SIGNIFICANT CHANGE UP (ref 22–31)
CO2 SERPL-SCNC: 28 MMOL/L — SIGNIFICANT CHANGE UP (ref 22–31)
CO2 SERPL-SCNC: 28 MMOL/L — SIGNIFICANT CHANGE UP (ref 22–31)
CREAT SERPL-MCNC: 3.74 MG/DL — HIGH (ref 0.5–1.3)
CREAT SERPL-MCNC: 4.05 MG/DL — HIGH (ref 0.5–1.3)
CREAT SERPL-MCNC: 4.3 MG/DL — HIGH (ref 0.5–1.3)
CREAT SERPL-MCNC: 4.76 MG/DL — HIGH (ref 0.5–1.3)
CREAT SERPL-MCNC: 4.89 MG/DL — HIGH (ref 0.5–1.3)
EOSINOPHIL # BLD AUTO: 0 K/UL — SIGNIFICANT CHANGE UP (ref 0–0.5)
EOSINOPHIL # BLD AUTO: 0.1 K/UL — SIGNIFICANT CHANGE UP (ref 0–0.5)
EOSINOPHIL NFR BLD AUTO: 0.4 % — SIGNIFICANT CHANGE UP (ref 0–6)
EOSINOPHIL NFR BLD AUTO: 0.7 % — SIGNIFICANT CHANGE UP (ref 0–6)
GAS PNL BLDV: SIGNIFICANT CHANGE UP
GAS PNL BLDV: SIGNIFICANT CHANGE UP
GLUCOSE BLDC GLUCOMTR-MCNC: 107 MG/DL — HIGH (ref 70–99)
GLUCOSE BLDC GLUCOMTR-MCNC: 114 MG/DL — HIGH (ref 70–99)
GLUCOSE BLDC GLUCOMTR-MCNC: 117 MG/DL — HIGH (ref 70–99)
GLUCOSE BLDC GLUCOMTR-MCNC: 119 MG/DL — HIGH (ref 70–99)
GLUCOSE BLDC GLUCOMTR-MCNC: 123 MG/DL — HIGH (ref 70–99)
GLUCOSE BLDC GLUCOMTR-MCNC: 129 MG/DL — HIGH (ref 70–99)
GLUCOSE BLDC GLUCOMTR-MCNC: 130 MG/DL — HIGH (ref 70–99)
GLUCOSE BLDC GLUCOMTR-MCNC: 130 MG/DL — HIGH (ref 70–99)
GLUCOSE BLDC GLUCOMTR-MCNC: 135 MG/DL — HIGH (ref 70–99)
GLUCOSE BLDC GLUCOMTR-MCNC: 139 MG/DL — HIGH (ref 70–99)
GLUCOSE BLDC GLUCOMTR-MCNC: 140 MG/DL — HIGH (ref 70–99)
GLUCOSE BLDC GLUCOMTR-MCNC: 171 MG/DL — HIGH (ref 70–99)
GLUCOSE BLDC GLUCOMTR-MCNC: 198 MG/DL — HIGH (ref 70–99)
GLUCOSE BLDC GLUCOMTR-MCNC: 232 MG/DL — HIGH (ref 70–99)
GLUCOSE BLDC GLUCOMTR-MCNC: 240 MG/DL — HIGH (ref 70–99)
GLUCOSE SERPL-MCNC: 114 MG/DL — HIGH (ref 70–99)
GLUCOSE SERPL-MCNC: 129 MG/DL — HIGH (ref 70–99)
GLUCOSE SERPL-MCNC: 155 MG/DL — HIGH (ref 70–99)
GLUCOSE SERPL-MCNC: 178 MG/DL — HIGH (ref 70–99)
GLUCOSE SERPL-MCNC: 237 MG/DL — HIGH (ref 70–99)
HAPTOGLOB SERPL-MCNC: 160 MG/DL — SIGNIFICANT CHANGE UP (ref 34–200)
HCT VFR BLD CALC: 25.9 % — LOW (ref 34.5–45)
HCT VFR BLD CALC: 27 % — LOW (ref 34.5–45)
HGB BLD-MCNC: 8.6 G/DL — LOW (ref 11.5–15.5)
HGB BLD-MCNC: 8.8 G/DL — LOW (ref 11.5–15.5)
LDH SERPL L TO P-CCNC: 302 U/L — HIGH (ref 50–242)
LYMPHOCYTES # BLD AUTO: 0.8 K/UL — LOW (ref 1–3.3)
LYMPHOCYTES # BLD AUTO: 1.2 K/UL — SIGNIFICANT CHANGE UP (ref 1–3.3)
LYMPHOCYTES # BLD AUTO: 15 % — SIGNIFICANT CHANGE UP (ref 13–44)
LYMPHOCYTES # BLD AUTO: 8.3 % — LOW (ref 13–44)
MAGNESIUM SERPL-MCNC: 2.1 MG/DL — SIGNIFICANT CHANGE UP (ref 1.6–2.6)
MAGNESIUM SERPL-MCNC: 2.2 MG/DL — SIGNIFICANT CHANGE UP (ref 1.6–2.6)
MAGNESIUM SERPL-MCNC: 2.3 MG/DL — SIGNIFICANT CHANGE UP (ref 1.6–2.6)
MCHC RBC-ENTMCNC: 32.7 GM/DL — SIGNIFICANT CHANGE UP (ref 32–36)
MCHC RBC-ENTMCNC: 33.3 GM/DL — SIGNIFICANT CHANGE UP (ref 32–36)
MCHC RBC-ENTMCNC: 33.8 PG — SIGNIFICANT CHANGE UP (ref 27–34)
MCHC RBC-ENTMCNC: 33.9 PG — SIGNIFICANT CHANGE UP (ref 27–34)
MCV RBC AUTO: 102 FL — HIGH (ref 80–100)
MCV RBC AUTO: 103 FL — HIGH (ref 80–100)
MONOCYTES # BLD AUTO: 0.8 K/UL — SIGNIFICANT CHANGE UP (ref 0–0.9)
MONOCYTES # BLD AUTO: 1 K/UL — HIGH (ref 0–0.9)
MONOCYTES NFR BLD AUTO: 10.7 % — SIGNIFICANT CHANGE UP (ref 2–14)
MONOCYTES NFR BLD AUTO: 10.7 % — SIGNIFICANT CHANGE UP (ref 2–14)
NEUTROPHILS # BLD AUTO: 5.7 K/UL — SIGNIFICANT CHANGE UP (ref 1.8–7.4)
NEUTROPHILS # BLD AUTO: 7.4 K/UL — SIGNIFICANT CHANGE UP (ref 1.8–7.4)
NEUTROPHILS NFR BLD AUTO: 73.3 % — SIGNIFICANT CHANGE UP (ref 43–77)
NEUTROPHILS NFR BLD AUTO: 80.5 % — HIGH (ref 43–77)
PHOSPHATE SERPL-MCNC: 4.8 MG/DL — HIGH (ref 2.5–4.5)
PHOSPHATE SERPL-MCNC: 5.3 MG/DL — HIGH (ref 2.5–4.5)
PHOSPHATE SERPL-MCNC: 5.7 MG/DL — HIGH (ref 2.5–4.5)
PLATELET # BLD AUTO: 226 K/UL — SIGNIFICANT CHANGE UP (ref 150–400)
PLATELET # BLD AUTO: 228 K/UL — SIGNIFICANT CHANGE UP (ref 150–400)
POTASSIUM SERPL-MCNC: 4.3 MMOL/L — SIGNIFICANT CHANGE UP (ref 3.5–5.3)
POTASSIUM SERPL-MCNC: 4.4 MMOL/L — SIGNIFICANT CHANGE UP (ref 3.5–5.3)
POTASSIUM SERPL-MCNC: 4.6 MMOL/L — SIGNIFICANT CHANGE UP (ref 3.5–5.3)
POTASSIUM SERPL-MCNC: 4.8 MMOL/L — SIGNIFICANT CHANGE UP (ref 3.5–5.3)
POTASSIUM SERPL-MCNC: 4.8 MMOL/L — SIGNIFICANT CHANGE UP (ref 3.5–5.3)
POTASSIUM SERPL-SCNC: 4.3 MMOL/L — SIGNIFICANT CHANGE UP (ref 3.5–5.3)
POTASSIUM SERPL-SCNC: 4.4 MMOL/L — SIGNIFICANT CHANGE UP (ref 3.5–5.3)
POTASSIUM SERPL-SCNC: 4.6 MMOL/L — SIGNIFICANT CHANGE UP (ref 3.5–5.3)
POTASSIUM SERPL-SCNC: 4.8 MMOL/L — SIGNIFICANT CHANGE UP (ref 3.5–5.3)
POTASSIUM SERPL-SCNC: 4.8 MMOL/L — SIGNIFICANT CHANGE UP (ref 3.5–5.3)
PROT SERPL-MCNC: 5.8 G/DL — LOW (ref 6–8.3)
RBC # BLD: 2.46 M/UL — LOW (ref 3.8–5.2)
RBC # BLD: 2.54 M/UL — LOW (ref 3.8–5.2)
RBC # BLD: 2.62 M/UL — LOW (ref 3.8–5.2)
RBC # FLD: 14 % — SIGNIFICANT CHANGE UP (ref 10.3–14.5)
RBC # FLD: 14.2 % — SIGNIFICANT CHANGE UP (ref 10.3–14.5)
RETICS #: 93.1 K/UL — SIGNIFICANT CHANGE UP (ref 25–125)
RETICS/RBC NFR: 3.8 % — HIGH (ref 0.5–2.5)
RH IG SCN BLD-IMP: POSITIVE — SIGNIFICANT CHANGE UP
SODIUM SERPL-SCNC: 134 MMOL/L — LOW (ref 135–145)
SODIUM SERPL-SCNC: 136 MMOL/L — SIGNIFICANT CHANGE UP (ref 135–145)
VANCOMYCIN FLD-MCNC: 17.2 UG/ML — SIGNIFICANT CHANGE UP
WBC # BLD: 7.8 K/UL — SIGNIFICANT CHANGE UP (ref 3.8–10.5)
WBC # BLD: 9.2 K/UL — SIGNIFICANT CHANGE UP (ref 3.8–10.5)
WBC # FLD AUTO: 7.8 K/UL — SIGNIFICANT CHANGE UP (ref 3.8–10.5)
WBC # FLD AUTO: 9.2 K/UL — SIGNIFICANT CHANGE UP (ref 3.8–10.5)

## 2019-01-20 PROCEDURE — 99223 1ST HOSP IP/OBS HIGH 75: CPT | Mod: GC

## 2019-01-20 PROCEDURE — 99233 SBSQ HOSP IP/OBS HIGH 50: CPT | Mod: GC

## 2019-01-20 RX ORDER — SENNA PLUS 8.6 MG/1
2 TABLET ORAL AT BEDTIME
Qty: 0 | Refills: 0 | Status: DISCONTINUED | OUTPATIENT
Start: 2019-01-20 | End: 2019-01-25

## 2019-01-20 RX ORDER — DEXTROSE 50 % IN WATER 50 %
25 SYRINGE (ML) INTRAVENOUS ONCE
Qty: 0 | Refills: 0 | Status: DISCONTINUED | OUTPATIENT
Start: 2019-01-20 | End: 2019-01-25

## 2019-01-20 RX ORDER — ASPIRIN/CALCIUM CARB/MAGNESIUM 324 MG
81 TABLET ORAL DAILY
Qty: 0 | Refills: 0 | Status: DISCONTINUED | OUTPATIENT
Start: 2019-01-20 | End: 2019-01-25

## 2019-01-20 RX ORDER — SODIUM CHLORIDE 9 MG/ML
1000 INJECTION, SOLUTION INTRAVENOUS
Qty: 0 | Refills: 0 | Status: DISCONTINUED | OUTPATIENT
Start: 2019-01-20 | End: 2019-01-25

## 2019-01-20 RX ORDER — INSULIN HUMAN 100 [IU]/ML
1 INJECTION, SOLUTION SUBCUTANEOUS
Qty: 100 | Refills: 0 | Status: DISCONTINUED | OUTPATIENT
Start: 2019-01-20 | End: 2019-01-20

## 2019-01-20 RX ORDER — CINACALCET 30 MG/1
60 TABLET, FILM COATED ORAL DAILY
Qty: 0 | Refills: 0 | Status: DISCONTINUED | OUTPATIENT
Start: 2019-01-20 | End: 2019-01-25

## 2019-01-20 RX ORDER — CLOPIDOGREL BISULFATE 75 MG/1
75 TABLET, FILM COATED ORAL DAILY
Qty: 0 | Refills: 0 | Status: DISCONTINUED | OUTPATIENT
Start: 2019-01-20 | End: 2019-01-24

## 2019-01-20 RX ORDER — DULOXETINE HYDROCHLORIDE 30 MG/1
60 CAPSULE, DELAYED RELEASE ORAL DAILY
Qty: 0 | Refills: 0 | Status: DISCONTINUED | OUTPATIENT
Start: 2019-01-20 | End: 2019-01-25

## 2019-01-20 RX ORDER — INSULIN LISPRO 100/ML
2 VIAL (ML) SUBCUTANEOUS
Qty: 0 | Refills: 0 | Status: DISCONTINUED | OUTPATIENT
Start: 2019-01-20 | End: 2019-01-21

## 2019-01-20 RX ORDER — INSULIN GLARGINE 100 [IU]/ML
6 INJECTION, SOLUTION SUBCUTANEOUS AT BEDTIME
Qty: 0 | Refills: 0 | Status: DISCONTINUED | OUTPATIENT
Start: 2019-01-21 | End: 2019-01-21

## 2019-01-20 RX ORDER — SEVELAMER CARBONATE 2400 MG/1
800 POWDER, FOR SUSPENSION ORAL THREE TIMES A DAY
Qty: 0 | Refills: 0 | Status: DISCONTINUED | OUTPATIENT
Start: 2019-01-20 | End: 2019-01-25

## 2019-01-20 RX ORDER — METOPROLOL TARTRATE 50 MG
50 TABLET ORAL DAILY
Qty: 0 | Refills: 0 | Status: DISCONTINUED | OUTPATIENT
Start: 2019-01-20 | End: 2019-01-20

## 2019-01-20 RX ORDER — INSULIN GLARGINE 100 [IU]/ML
6 INJECTION, SOLUTION SUBCUTANEOUS ONCE
Qty: 0 | Refills: 0 | Status: COMPLETED | OUTPATIENT
Start: 2019-01-20 | End: 2019-01-20

## 2019-01-20 RX ORDER — INSULIN LISPRO 100/ML
VIAL (ML) SUBCUTANEOUS
Qty: 0 | Refills: 0 | Status: DISCONTINUED | OUTPATIENT
Start: 2019-01-20 | End: 2019-01-21

## 2019-01-20 RX ORDER — INSULIN HUMAN 100 [IU]/ML
0.5 INJECTION, SOLUTION SUBCUTANEOUS
Qty: 100 | Refills: 0 | Status: DISCONTINUED | OUTPATIENT
Start: 2019-01-20 | End: 2019-01-20

## 2019-01-20 RX ORDER — OXYCODONE AND ACETAMINOPHEN 5; 325 MG/1; MG/1
1 TABLET ORAL EVERY 4 HOURS
Qty: 0 | Refills: 0 | Status: DISCONTINUED | OUTPATIENT
Start: 2019-01-20 | End: 2019-01-25

## 2019-01-20 RX ORDER — LISINOPRIL 2.5 MG/1
40 TABLET ORAL DAILY
Qty: 0 | Refills: 0 | Status: DISCONTINUED | OUTPATIENT
Start: 2019-01-20 | End: 2019-01-20

## 2019-01-20 RX ORDER — GLUCAGON INJECTION, SOLUTION 0.5 MG/.1ML
1 INJECTION, SOLUTION SUBCUTANEOUS ONCE
Qty: 0 | Refills: 0 | Status: DISCONTINUED | OUTPATIENT
Start: 2019-01-20 | End: 2019-01-25

## 2019-01-20 RX ORDER — DEXTROSE 50 % IN WATER 50 %
15 SYRINGE (ML) INTRAVENOUS ONCE
Qty: 0 | Refills: 0 | Status: DISCONTINUED | OUTPATIENT
Start: 2019-01-20 | End: 2019-01-25

## 2019-01-20 RX ORDER — ATORVASTATIN CALCIUM 80 MG/1
20 TABLET, FILM COATED ORAL AT BEDTIME
Qty: 0 | Refills: 0 | Status: DISCONTINUED | OUTPATIENT
Start: 2019-01-20 | End: 2019-01-25

## 2019-01-20 RX ORDER — INSULIN LISPRO 100/ML
VIAL (ML) SUBCUTANEOUS AT BEDTIME
Qty: 0 | Refills: 0 | Status: DISCONTINUED | OUTPATIENT
Start: 2019-01-20 | End: 2019-01-25

## 2019-01-20 RX ORDER — DEXTROSE 50 % IN WATER 50 %
12.5 SYRINGE (ML) INTRAVENOUS ONCE
Qty: 0 | Refills: 0 | Status: DISCONTINUED | OUTPATIENT
Start: 2019-01-20 | End: 2019-01-25

## 2019-01-20 RX ADMIN — Medication 2: at 14:51

## 2019-01-20 RX ADMIN — OXYCODONE AND ACETAMINOPHEN 1 TABLET(S): 5; 325 TABLET ORAL at 23:49

## 2019-01-20 RX ADMIN — OXYCODONE AND ACETAMINOPHEN 1 TABLET(S): 5; 325 TABLET ORAL at 19:34

## 2019-01-20 RX ADMIN — INSULIN GLARGINE 6 UNIT(S): 100 INJECTION, SOLUTION SUBCUTANEOUS at 12:00

## 2019-01-20 RX ADMIN — OXYCODONE AND ACETAMINOPHEN 1 TABLET(S): 5; 325 TABLET ORAL at 20:05

## 2019-01-20 RX ADMIN — ATORVASTATIN CALCIUM 20 MILLIGRAM(S): 80 TABLET, FILM COATED ORAL at 21:04

## 2019-01-20 RX ADMIN — SEVELAMER CARBONATE 800 MILLIGRAM(S): 2400 POWDER, FOR SUSPENSION ORAL at 21:04

## 2019-01-20 RX ADMIN — Medication 1: at 17:49

## 2019-01-20 RX ADMIN — PIPERACILLIN AND TAZOBACTAM 25 GRAM(S): 4; .5 INJECTION, POWDER, LYOPHILIZED, FOR SOLUTION INTRAVENOUS at 10:09

## 2019-01-20 RX ADMIN — Medication 2 UNIT(S): at 17:49

## 2019-01-20 NOTE — CONSULT NOTE ADULT - SUBJECTIVE AND OBJECTIVE BOX
CHIEF COMPLAINT: "i ran out of my insulin"    HISTORY OF PRESENT ILLNESS:  64 yo F with ESRD (on HD M/Tu/Th/Sa), type 1 DM, CAD, HFrEF (EF 50% on TTE from 10/18), TIA, and PVD, who presented from HD with complaints of N/V and hyperglycemia. Patient previously on indulin pump, however as per report, patient was transitioned to basal/bolus regimen. Patient ran out of her lantus yesterday and therefore did not take it. Patient presented to HD today, where she was complainking of SOB. She was therefore given extra fluid removal today, and was also found to be hyperglycemic at that time. Patient continued to feel SOB after HD, and was brought to the ED at that time.    In the ED, patient found to be tachypneic (20), and hypotensive (as low as 70s/40s). Labs significant for leukocytopenia (WBC 3), hyperglycemia (269), elevated trops 201, with BHB of 6.3. Patient given 500cc NS bolus. Central line placed, and patient started on insulin gtt and pressors. Admitted to MICU for further care     after tx for presumed sepsis and DKA her encephalopathy has improved and she is no longer hypotensive. she currently denies any cp/sob/pnd/orthopnea/palps    Allergies  No Known Allergies    MEDICATIONS:  heparin  Injectable 5000 Unit(s) SubCutaneous every 8 hours  piperacillin/tazobactam IVPB. 3.375 Gram(s) IV Intermittent every 12 hours  insulin Infusion 0.5 Unit(s)/Hr IV Continuous <Continuous>  dextrose 10%. 1000 milliLiter(s) IV Continuous <Continuous>      PAST MEDICAL & SURGICAL HISTORY:  CHF (congestive heart failure): EF 40-45%  Subclavian vein stenosis, left: s/p stent  DKA, type 1: 1/2015  ACS (acute coronary syndrome): 1/2015 - cath revealed 100% ostial stenosis not amenable to PCI - medical management  TIA (transient ischemic attack): x 2 - 8-9 years ago prior to ASD/VSD repair  CAD (coronary artery disease): s/p stents  Gout: past  CVA (cerebral infarction): with no residual, 8 yrs ago, prior to heart surgery - ST memory loss  Peripheral vascular disease: occluded left fem-pop bypass 5/2015  Diabetes mellitus type 1: Insulin Dependent - [employer]  Minimed Paradigm Insulin Pump - Novolog  ESRD (end stage renal disease): dialysis  M, tue, thursday, saturday  Hyperlipidemia  Status post device closure of ASD: &quot;b[place]&quot;  History of cardiac catheterization: 1/2015 - no intervention  S/P femoral-popliteal bypass surgery: L and R in 2013 with graft; 5/2015 CFA angioplasty left and ileofemoral endarterectomywith vein patch angioplasty of left fem-pop bypass graft  Multiple vascular surgery both leg, left fempop bypass revision 11/2015  AV (arteriovenous fistula): Left AV graft; revision with stent placement 2-3 years ago  S/P cholecystectomy      FAMILY HISTORY:  Family history of smoking  Family history of hypertension  Family history of cancer (Sibling)      SOCIAL HISTORY:    former smoker    REVIEW OF SYSTEMS:  See HPI, otherwise complete 10 point review of systems negative    [ ] All others negative	      PHYSICAL EXAM:  T(C): 36.2 (01-20-19 @ 08:00), Max: 38.2 (01-19-19 @ 19:54)  HR: 64 (01-20-19 @ 12:00) (64 - 92)  BP: 128/64 (01-20-19 @ 12:00) (74/40 - 143/67)  RR: 18 (01-20-19 @ 12:00) (11 - 26)  SpO2: 99% (01-20-19 @ 12:00) (95% - 100%)  Wt(kg): --  I&O's Summary    19 Jan 2019 07:01  -  20 Jan 2019 07:00  --------------------------------------------------------  IN: 679.3 mL / OUT: 0 mL / NET: 679.3 mL    20 Jan 2019 07:01  -  20 Jan 2019 12:47  --------------------------------------------------------  IN: 82 mL / OUT: 0 mL / NET: 82 mL        Appearance: No Acute Distress	  HEENT:  Normal oral mucosa, PERRL, EOMI	  Cardiovascular: Normal S1 S2, No JVD, No murmurs/rubs/gallops  Respiratory: Lungs clear to auscultation bilaterally  Gastrointestinal:  Soft, Non-tender, + BS	  Skin: No rashes, No ecchymoses, No cyanosis	  Neurologic: Non-focal  Extremities: No clubbing, cyanosis or edema  Vascular: Peripheral pulses palpable 2+ bilaterally  Psychiatry: A & O x 3, Mood & affect appropriate    Laboratory Data:	 	    CBC Full  -  ( 20 Jan 2019 06:17 )  WBC Count : 7.8 K/uL  Hemoglobin : 8.8 g/dL  Hematocrit : 27.0 %  Platelet Count - Automated : 226 K/uL  Mean Cell Volume : 103.0 fl  Mean Cell Hemoglobin : 33.8 pg  Mean Cell Hemoglobin Concentration : 32.7 gm/dL  Auto Neutrophil # : 5.7 K/uL  Auto Lymphocyte # : 1.2 K/uL  Auto Monocyte # : 0.8 K/uL  Auto Eosinophil # : 0.1 K/uL  Auto Basophil # : 0.0 K/uL  Auto Neutrophil % : 73.3 %  Auto Lymphocyte % : 15.0 %  Auto Monocyte % : 10.7 %  Auto Eosinophil % : 0.7 %  Auto Basophil % : 0.3 %    01-20    136  |  91<L>  |  39<H>  ----------------------------<  129<H>  4.8   |  28  |  4.30<H>  01-20    136  |  93<L>  |  36<H>  ----------------------------<  114<H>  4.8   |  27  |  4.05<H>    Ca    7.8<L>      20 Jan 2019 10:15  Ca    7.7<L>      20 Jan 2019 06:16  Phos  5.7     01-20  Phos  4.8     01-20  Mg     2.3     01-20  Mg     2.2     01-20    TPro  5.8<L>  /  Alb  3.8  /  TBili  0.3  /  DBili  x   /  AST  95<H>  /  ALT  64<H>  /  AlkPhos  69  01-20  TPro  6.9  /  Alb  4.4  /  TBili  0.5  /  DBili  x   /  AST  36  /  ALT  34  /  AlkPhos  83  01-19        Interpretation of Telemetry: nsr	    ECG:  	nsr nonspecifc st changes as q waves      Assessment:  - DKA in the setting of medication non-compliance and possible sepsis  -hypotension, shock -> likely vasodilatory  -ischemic cardiomyopathy, cad s/p multiple stents  -pulmonary edema  -esrd on hd  -PAD s/p prior intervention   -remote TIA        Recs:  -appreciate MICU  -agree with broad spectrum abx and treatment of presumed sepsis. f/u cx  -would obtain TTE to r/o cardiac component of her hypotension. lalctate normalized. venous sat 76% argues against cardiogenic component  -management of DKA per MICU and endo  -no true ischemic sx. ekg with nonspecific ST changes. would defer ischemic work up for now unless  -would resume asa, plavix, statin for ischemic cardiomyopathy/CAD/PAD if not contraindicated  -agree with holding bb and hydralazine for now in setting of hyoptension and recent pressors. resume when tolerates for ischemic cardiomyopathy and hx of HTN  -dvt ppx      Greater than 60 minutes spent on total encounter; more than 50% of the visit was spent counseling and/or coordinating care by the attending physician.   	  Julián Biswas MD   Cardiovascular Diseases  [phone]

## 2019-01-20 NOTE — CHART NOTE - NSCHARTNOTEFT_GEN_A_CORE
MICU Transfer Note    Transfer from: MICU    Transfer to: ( x ) Medicine    (  ) Telemetry     (   ) RCU        (    ) Palliative         (   ) Stroke Unit          (   ) __________________    Accepting Physician:  Signout given to:     MICU COURSE:  Patient is a 62 yo F with ESRD (on HD M/Tu/Th/Sa), type 1 DM, CAD, HFrEF (EF 50% on TTE from 10/18), TIA, and PVD, who presented from HD for N/V and hyperglycemia and admitted to MICU for DKA and hypotension (70s/40s) requiring pressors.  Pt was placed on levo drip and titrated off.  On presentation pt had AG of 28 and was placed on insulin drip.  AG closed to 19 and pt was started on 9 Lantus and 2 lispro humalog as per endo recs w/ repeat BMP showing AG of ....    Pt was transferred to floors; pt due for dialysis on 1/21.            ASSESSMENT & PLAN:     Patient is a 62 yo F with ESRD (on HD M/Tu/Th/Sa), type 1 DM, CAD, HFrEF (EF 50% on TTE from 10/18), TIA, and PVD, who presented from HD w/ DKA and severe sepsis now off pressors w/ closure of the AG being transitioned from insulin drip to lantus    # Neuro   - no active issues    # Cardiac  - no active issues     # Respiratory   - no active issues     # Endocrine  - AG closed.  Started on Lantus and lispro as per endo recs.  Repeat BMP 4 hours after insulin drip d/c continued to show closure of AG     # Renal/electrolytes   - HD patient: M/T/Th/Sat; outpatient nephrologist is Dr. Ley.  In house nephro is private     # Heme:   - pt had Hbg drop from 10.3 to 8.8; likely hemoconcentration.  Hemolytic workup neg (LDH elevated but haptoglobin wnl).  Please f/u w/ private nephrology about starting EPO     #ID  - was started on broad spectrum abx bc pt presented w/ severe sepsis that has no resolved.  No signs of infection, pt off pressors and lactate returned to normal so stopped abx           FOR FOLLOW UP:  - Pt on BB, ACE and hydralazine outpatient - holding in setting of recent pressor requirement.  Please reintroduce to assure she can tolerate before discharge.   - dialysis due 1/21 MICU Transfer Note    Transfer from: MICU    Transfer to: ( x ) Medicine    (  ) Telemetry     (   ) RCU        (    ) Palliative         (   ) Stroke Unit          (   ) __________________    Accepting Physician: Dr. Pierce Viera  Signout given to: Dr. Pierce Viera    MICU COURSE:  Patient is a 62 yo F with ESRD (on HD M/Tu/Th/Sa), type 1 DM, CAD, HFrEF (EF 50% on TTE from 10/18), TIA, and PVD, who presented from HD for N/V and hyperglycemia and admitted to MICU for DKA and hypotension (70s/40s) requiring pressors.  Pt was placed on levo drip and titrated off.  On presentation pt had AG of 28 and was placed on insulin drip.  AG closed to 19 and pt was started on 9 Lantus and 2 lispro humalog as per endo recs w/ repeat BMP showing AG of ....    Pt was transferred to floors; pt due for dialysis on 1/21.            ASSESSMENT & PLAN:     Patient is a 62 yo F with ESRD (on HD M/Tu/Th/Sa), type 1 DM, CAD, HFrEF (EF 50% on TTE from 10/18), TIA, and PVD, who presented from HD w/ DKA and severe sepsis now off pressors w/ closure of the AG being transitioned from insulin drip to lantus    # Neuro   - no active issues    # Cardiac  - no active issues     # Respiratory   - no active issues     # Endocrine  - AG closed.  Started on Lantus and lispro as per endo recs.  Repeat BMP 4 hours after insulin drip d/c continued to show closure of AG     # Renal/electrolytes   - HD patient: M/T/Th/Sat; outpatient nephrologist is Dr. Ley.  In house nephro is private     # Heme:   - pt had Hbg drop from 10.3 to 8.8; likely hemoconcentration.  Hemolytic workup neg (LDH elevated but haptoglobin wnl).  Please f/u w/ private nephrology about starting EPO     #ID  - was started on broad spectrum abx bc pt presented w/ severe sepsis that has no resolved.  No signs of infection, pt off pressors and lactate returned to normal so stopped abx           FOR FOLLOW UP:  - Pt on BB, ACE and hydralazine outpatient - holding in setting of recent pressor requirement.  Please reintroduce to assure she can tolerate before discharge.   - dialysis due 1/21 MICU Transfer Note    Transfer from: MICU    Transfer to: ( x ) Medicine    (  ) Telemetry     (   ) RCU        (    ) Palliative         (   ) Stroke Unit          (   ) __________________    Accepting Physician: Dr. Pierce Viera  Signout given to: Dr. Pierce Viera    MICU COURSE:  Patient is a 62 yo F with ESRD (on HD M/Tu/Th/Sa), type 1 DM, CAD, HFrEF (EF 50% on TTE from 10/18), TIA, and PVD, who presented from HD for N/V and hyperglycemia and admitted to MICU for DKA and hypotension (70s/40s) requiring pressors.  Pt was placed on levo drip and titrated off.  On presentation pt had AG of 28 and was placed on insulin drip.  AG closed to 19 and pt was started on 9 Lantus and 2 lispro humalog as per endo recs w/ repeat BMP showing AG of 18 and now on Lantus 6 and humalog 2 TID with meals as per endo recs.    Pt was transferred to floors; pt due for dialysis on 1/21.            ASSESSMENT & PLAN:     Patient is a 62 yo F with ESRD (on HD M/Tu/Th/Sa), type 1 DM, CAD, HFrEF (EF 50% on TTE from 10/18), TIA, and PVD, who presented from HD w/ DKA and severe sepsis now off pressors w/ closure of the AG being transitioned from insulin drip to lantus    # Neuro   - no active issues    # Cardiac  - no active issues     # Respiratory   - no active issues     # Endocrine  - AG closed.  Started on Lantus and lispro as per endo recs.  Repeat BMP 4 hours after insulin drip d/c continued to show closure of AG     # Renal/electrolytes   - HD patient: M/T/Th/Sat; outpatient nephrologist is Dr. Ley.  In house nephro is private     # Heme:   - pt had Hbg drop from 10.3 to 8.8; likely hemoconcentration.  Hemolytic workup neg (LDH elevated but haptoglobin wnl).  Please f/u w/ private nephrology about starting EPO     #ID  - was started on broad spectrum abx bc pt presented w/ severe sepsis that has no resolved.  No signs of infection, pt off pressors and lactate returned to normal so stopped abx       FOR FOLLOW UP:  - Pt on BB, ACE and hydralazine outpatient - holding in setting of recent pressor requirement.  Please reintroduce to assure she can tolerate before discharge.   - dialysis due 1/21 MICU Transfer Note    Transfer from: MICU    Transfer to: ( x ) Medicine    (  ) Telemetry     (   ) RCU        (    ) Palliative         (   ) Stroke Unit          (   ) __________________    Accepting Physician: Dr. Pierce Viera  Signout given to: Dr. Pierce Viera    MICU COURSE:  Patient is a 64 yo F with ESRD (on HD M/Tu/Th/Sa), type 1 DM, CAD, HFrEF (EF 50% on TTE from 10/18), TIA, and PVD, who presented from HD for N/V and hyperglycemia and admitted to MICU for DKA and hypotension (70s/40s) requiring pressors.  Pt was placed on levo drip and titrated off.  On presentation pt had AG of 28 and was placed on insulin drip.  AG closed to 19 and pt was started on 9 Lantus and 2 lispro humalog as per endo recs w/ repeat BMP showing AG of 18 and now on Lantus 6 and humalog 2 TID with meals as per endo recs.    Pt was transferred to floors; pt due for dialysis on 1/21.        ASSESSMENT & PLAN:     Patient is a 64 yo F with ESRD (on HD M/Tu/Th/Sa), type 1 DM, CAD, HFrEF (EF 50% on TTE from 10/18), TIA, and PVD, who presented from HD w/ DKA and severe sepsis now off pressors with AG of 18 now transitioned from insulin drip to lantus+lispro with moderate sliding scale    # Neuro   - no active issues    # Cardiac  - no active issues     # Respiratory   - no active issues     # Endocrine  - AG of 18.  Started on Lantus 6U qHS and lispro 2U TID as per endo recs as well as moderate sliding scale.      # Renal/electrolytes   - HD patient: M/T/Th/Sat; outpatient nephrologist is Dr. Ley.  In house nephro is private     # Heme:   - pt had Hbg drop from 10.3 to 8.8; likely hemoconcentration.  Hemolytic workup neg (LDH elevated but haptoglobin wnl).  Please f/u w/ private nephrology about starting EPO     #ID  - was started on broad spectrum abx bc pt presented w/ severe sepsis that has no resolved.  No signs of infection, pt off pressors and lactate returned to normal so stopped abx       FOR FOLLOW UP:  - Pt on BB, ACE and hydralazine outpatient - holding in setting of recent pressor requirement.  Please reintroduce to assure she can tolerate before discharge.   - dialysis due 1/21

## 2019-01-20 NOTE — PROGRESS NOTE ADULT - ASSESSMENT
Patient is a 62 yo F with ESRD (on HD M/Tu/Th/Sa), type 1 DM, CAD, HFrEF (EF 50% on TTE from 10/18), TIA, and PVD, who presented from HD w/ DKA and severe sepsis now off pressors w/ closure of the AG being transitioned from insulin drip to lantus    # Neuro   - no active issues    # Cardiac  - no active issues     # Respiratory   - no active issues     # Endocrine  - AG closed.  Started on Lantus and lispro as per endo recs    # Renal/electrolytes   - HD patient: M/T/Th/Sat; outpatient nephrologist is Dr. Ley who is not in [facility].  Consulted house nephro for dialysis     # Heme:   - pt had Hbg drop from 10.3 to 8.8; likely hemoconcentration.  Hemolytic workup neg (LDH elevated but haptoglobin wnl)     #ID  - was started on broad spectrum abx bc pt presented w/ severe sepsis that has no resolved.  No signs of infection, pt off pressors and lactate returned to normal so stopped abx   # Dispo  - will monitor pt until insulin drip d/c and transfer to floors

## 2019-01-20 NOTE — CONSULT NOTE ADULT - SUBJECTIVE AND OBJECTIVE BOX
HPI:  Patient is a 62 yo F with ESRD (on HD M/Tu/Th/Sa), type 1 DM, CAD, HFrEF (EF 50% on TTE from 10/18), TIA, and PVD, who presented from HD with complaints of N/V and hyperglycemia. Patient previously on indulin pump, however as per report, patient was transitioned to basal/bolus regimen. Patient ran out of her lantus yesterday and therefore did not take it. Patient presented to HD today, where she was complainking of SOB. She was therefore given extra fluid removal today, and was also found to be hyperglycemic at that time. Patient continued to feel SOB after HD, and was brought to the ED at that time.    In the ED, patient found to be tachypneic (20), and hypotensive (as low as 70s/40s). Labs significant for leukocytopenia (WBC 3), hyperglycemia (269), elevated trops 201, with BHB of 6.3. Patient given 500cc NS bolus. Central line placed, and patient started on insulin gtt and pressors. Admitted to MICU for further care (19 Jan 2019 18:44)    Endocrine history: 64 y/o F w/ hx of DM Type 1 (diagnosed 44 years ago) c/b nephropathy with ESRD on HD (last HD yesterday), neuropathy, retinopathy and CADs/p PCI here with DKA. Patient has had multiple hospitalizations for DKA. She was most recently hospitalized in Dec 2018. At that time her DKA was a result of running out of insulin and GI illness. Patient was previously on insulin pump but at last visit she was deemed to be a poor candidate for it and was discharged on Lantus 8U HS, I:C 1:15 and asked to follow correctional scale 1:50.     Patient states that she ran out of insulin the day prior to admission. She presented at HD with SOB and was transferred to ED after HD session. Here she was noted to be hypotensive and  have glucose 269, HCO3 21, AG 28 and BHB 6.3. Patient was transferred to MICU and started on insulin gtt and pressors.             PAST MEDICAL & SURGICAL HISTORY:  CHF (congestive heart failure): EF 40-45%  Subclavian vein stenosis, left: s/p stent  DKA, type 1: 1/2015  ACS (acute coronary syndrome): 1/2015 - cath revealed 100% ostial stenosis not amenable to PCI - medical management  TIA (transient ischemic attack): x 2 - 8-9 years ago prior to ASD/VSD repair  CAD (coronary artery disease): s/p stents  Gout: past  CVA (cerebral infarction): with no residual, 8 yrs ago, prior to heart surgery - ST memory loss  Peripheral vascular disease: occluded left fem-pop bypass 5/2015  Diabetes mellitus type 1: Insulin Dependent - [employer]tr[employer]  Minimed Paradigm Insulin Pump - Novolog  ESRD (end stage renal disease): dialysis  M, tue, thursday, saturday  Hyperlipidemia  Status post device closure of ASD: &quot;clamshell&quot;  History of cardiac catheterization: 1/2015 - no intervention  S/P femoral-popliteal bypass surgery: L and R in 2013 with graft; 5/2015 CFA angioplasty left and ileofemoral endarterectomywith vein patch angioplasty of left fem-pop bypass graft  Multiple vascular surgery both leg, left fempop bypass revision 11/2015  AV (arteriovenous fistula): Left AV graft; revision with stent placement 2-3 years ago  S/P cholecystectomy      FAMILY HISTORY:  Family history of smoking  Family history of hypertension  Family history of cancer (Sibling)      Social History:    Outpatient Medications:    MEDICATIONS  (STANDING):  acetaminophen  IVPB .. 1000 milliGRAM(s) IV Intermittent once  dextrose 10%. 1000 milliLiter(s) (20 mL/Hr) IV Continuous <Continuous>  heparin  Injectable 5000 Unit(s) SubCutaneous every 8 hours  insulin Infusion 0.5 Unit(s)/Hr (0.5 mL/Hr) IV Continuous <Continuous>  norepinephrine Infusion 0.05 MICROgram(s)/kG/Min (5.1 mL/Hr) IV Continuous <Continuous>  ondansetron Injectable 4 milliGRAM(s) IV Push once  piperacillin/tazobactam IVPB. 3.375 Gram(s) IV Intermittent every 12 hours    MEDICATIONS  (PRN):      Allergies    No Known Allergies    Intolerances      Review of Systems:  Constitutional: No fever  Eyes: No blurry vision  Neuro: No tremors  HEENT: No pain  Cardiovascular: No chest pain, palpitations  Respiratory: No SOB, no cough  GI: No nausea, vomiting, abdominal pain  : No dysuria  Skin: no rash  Psych: no depression  Endocrine: no polyuria, polydipsia  Hem/lymph: no swelling  Osteoporosis: no fractures    ALL OTHER SYSTEMS REVIEWED AND NEGATIVE    UNABLE TO OBTAIN    PHYSICAL EXAM:  VITALS: T(C): 36.2 (01-20-19 @ 08:00)  T(F): 97.1 (01-20-19 @ 08:00), Max: 100.7 (01-19-19 @ 19:54)  HR: 76 (01-20-19 @ 08:00) (69 - 92)  BP: 132/64 (01-20-19 @ 08:00) (74/40 - 140/66)  RR:  (14 - 26)  SpO2:  (95% - 100%)  Wt(kg): --  GENERAL: NAD, well-groomed, well-developed  EYES: No proptosis, no lid lag, anicteric  HEENT:  Atraumatic, Normocephalic, moist mucous membranes  THYROID: Normal size, no palpable nodules  RESPIRATORY: Clear to auscultation bilaterally; No rales, rhonchi, wheezing, or rubs  CARDIOVASCULAR: Regular rate and rhythm; No murmurs; no peripheral edema  GI: Soft, nontender, non distended, normal bowel sounds  SKIN: Dry, intact, No rashes or lesions  MUSCULOSKELETAL: Full range of motion, normal strength  NEURO: sensation intact, extraocular movements intact, no tremor, normal reflexes  PSYCH: Alert and oriented x 3, normal affect, normal mood  CUSHING'S SIGNS: no striae    POCT Blood Glucose.: 129 mg/dL (01-20-19 @ 09:06)  POCT Blood Glucose.: 117 mg/dL (01-20-19 @ 08:02)  POCT Blood Glucose.: 123 mg/dL (01-20-19 @ 07:05)  POCT Blood Glucose.: 114 mg/dL (01-20-19 @ 06:02)  POCT Blood Glucose.: 119 mg/dL (01-20-19 @ 04:56)  POCT Blood Glucose.: 107 mg/dL (01-20-19 @ 03:59)  POCT Blood Glucose.: 130 mg/dL (01-20-19 @ 03:02)  POCT Blood Glucose.: 130 mg/dL (01-20-19 @ 02:02)  POCT Blood Glucose.: 135 mg/dL (01-20-19 @ 01:02)  POCT Blood Glucose.: 171 mg/dL (01-20-19 @ 00:07)  POCT Blood Glucose.: 215 mg/dL (01-19-19 @ 22:59)  POCT Blood Glucose.: 240 mg/dL (01-19-19 @ 22:00)  POCT Blood Glucose.: 315 mg/dL (01-19-19 @ 21:03)  POCT Blood Glucose.: 321 mg/dL (01-19-19 @ 20:09)  POCT Blood Glucose.: 299 mg/dL (01-19-19 @ 18:58)  POCT Blood Glucose.: 254 mg/dL (01-19-19 @ 17:32)  POCT Blood Glucose.: 243 mg/dL (01-19-19 @ 15:13)                            8.8    7.8   )-----------( 226      ( 20 Jan 2019 06:17 )             27.0       01-20    136  |  93<L>  |  36<H>  ----------------------------<  114<H>  4.8   |  27  |  4.05<H>    EGFR if : 13<L>  EGFR if non : 11<L>    Ca    7.7<L>      01-20  Mg     2.2     01-20  Phos  4.8     01-20    TPro  5.8<L>  /  Alb  3.8  /  TBili  0.3  /  DBili  x   /  AST  95<H>  /  ALT  64<H>  /  AlkPhos  69  01-20      Thyroid Function Tests:      Hemoglobin A1C, Whole Blood: 8.6 % <H> [4.0 - 5.6] (11-16-18 @ 20:14)          Radiology: HPI:  Patient is a 64 yo F with ESRD (on HD M/Tu/Th/Sa), type 1 DM, CAD, HFrEF (EF 50% on TTE from 10/18), TIA, and PVD, who presented from HD with complaints of N/V and hyperglycemia. Patient previously on indulin pump, however as per report, patient was transitioned to basal/bolus regimen. Patient ran out of her lantus yesterday and therefore did not take it. Patient presented to HD today, where she was complainking of SOB. She was therefore given extra fluid removal today, and was also found to be hyperglycemic at that time. Patient continued to feel SOB after HD, and was brought to the ED at that time.    In the ED, patient found to be tachypneic (20), and hypotensive (as low as 70s/40s). Labs significant for leukocytopenia (WBC 3), hyperglycemia (269), elevated trops 201, with BHB of 6.3. Patient given 500cc NS bolus. Central line placed, and patient started on insulin gtt and pressors. Admitted to MICU for further care (19 Jan 2019 18:44)    Endocrine history: 64 y/o F w/ hx of DM Type 1 A1C 8.6 (diagnosed 44 years ago) c/b nephropathy with ESRD on HD (last HD yesterday), neuropathy, PVD, TIA, retinopathy and CAD s/p PCI here with DKA. Patient has had multiple hospitalizations for DKA. She was most recently hospitalized in Dec 2018. At that time her DKA was a result of running out of insulin and GI illness. Patient was previously on insulin pump but at last visit she was deemed to be a poor candidate for it and was discharged on Lantus 8U HS, I:C 1:15 and asked to follow correctional scale 1:50. States she has been taking Lantus 6-7U at night and Humalog 5U before meals. She has 5-6 small meals throughout the day. She checks her FS 4-5 X daily and says it is in 180-200 range. Has rare hypoglycemia. Patient has a high content of pasta in her diet. Drinks diet soda. No juice.     Patient states that she ran out of insulin on 1/17. Says she left messages at Dr Ley's office but was not able to get her insulin refilled.  Last saw Dr Ley on Jan 9th. She presented at HD with SOB and was transferred to ED after HD session. Here she was noted to be hypotensive and  have glucose 269, HCO3 21, AG 28 and BHB 6.3. Patient was transferred to MICU and started on insulin gtt and pressors.             PAST MEDICAL & SURGICAL HISTORY:  CHF (congestive heart failure): EF 40-45%  Subclavian vein stenosis, left: s/p stent  DKA, type 1: 1/2015  ACS (acute coronary syndrome): 1/2015 - cath revealed 100% ostial stenosis not amenable to PCI - medical management  TIA (transient ischemic attack): x 2 - 8-9 years ago prior to ASD/VSD repair  CAD (coronary artery disease): s/p stents  Gout: past  CVA (cerebral infarction): with no residual, 8 yrs ago, prior to heart surgery - ST memory loss  Peripheral vascular disease: occluded left fem-pop bypass 5/2015  Diabetes mellitus type 1: Insulin Dependent - [employer]  Minimed Paradigm Insulin Pump - Novolog  ESRD (end stage renal disease): dialysis  M, tue, thursday, saturday  Hyperlipidemia  Status post device closure of ASD: &quot;clamshell&quot;  History of cardiac catheterization: 1/2015 - no intervention  S/P femoral-popliteal bypass surgery: L and R in 2013 with graft; 5/2015 CFA angioplasty left and ileofemoral endarterectomywith vein patch angioplasty of left fem-pop bypass graft  Multiple vascular surgery both leg, left fempop bypass revision 11/2015  AV (arteriovenous fistula): Left AV graft; revision with stent placement 2-3 years ago  S/P cholecystectomy      FAMILY HISTORY:  Family history of smoking  Family history of hypertension  Family history of cancer (Sibling)      Social History: Lives with     Outpatient Medications:  · 	senna oral tablet: 2 tab(s) orally once a day (at bedtime), As Needed -for constipation   · 	Insulin Pen Needles, 4mm: 1 application subcutaneously 4 times a day. ** Use with insulin pen **   · 	Lantus Solostar Pen 100 units/mL subcutaneous solution: 6-7 unit(s) subcutaneous once a day (at bedtime)   · 	HumaLOG KwikPen 100 units/mL injectable solution: 5 unit(s) injectable 4-5 times a day (before meals).  · 	atorvastatin 20 mg oral tablet: 1 tab(s) orally once a day (at bedtime)  · 	aspirin 81 mg oral delayed release tablet: 1 tab(s) orally once a day  · 	Multiple Vitamins oral tablet: 1 tab(s) orally once a day  · 	lisinopril 40 mg oral tablet: 1 tab(s) orally once a day  · 	clopidogrel 75 mg oral tablet: 1 tab(s) orally once a day  · 	Metoprolol Succinate ER 50 mg oral tablet, extended release: 1 tab(s) orally once a day  · 	DULoxetine 60 mg oral delayed release capsule: 1 cap(s) orally once a day  · 	Sensipar 60 mg oral tablet: 1 tab(s) orally once a day  · 	Renvela 800 mg oral tablet: 3 tab(s) orally  (with meals)  · 	oxyCODONE-acetaminophen 5 mg-325 mg oral tablet: 1 tab(s) orally 1 to 2 times a day, As Needed for severe pain  · 	hydrALAZINE 25 mg oral tablet: 4 tab(s) orally every 8 hours      MEDICATIONS  (STANDING):  acetaminophen  IVPB .. 1000 milliGRAM(s) IV Intermittent once  dextrose 10%. 1000 milliLiter(s) (20 mL/Hr) IV Continuous <Continuous>  heparin  Injectable 5000 Unit(s) SubCutaneous every 8 hours  insulin Infusion 0.5 Unit(s)/Hr (0.5 mL/Hr) IV Continuous <Continuous>  norepinephrine Infusion 0.05 MICROgram(s)/kG/Min (5.1 mL/Hr) IV Continuous <Continuous>  ondansetron Injectable 4 milliGRAM(s) IV Push once  piperacillin/tazobactam IVPB. 3.375 Gram(s) IV Intermittent every 12 hours    MEDICATIONS  (PRN):      Allergies    No Known Allergies    Intolerances      Review of Systems:  Constitutional: No fever  Eyes: No blurry vision  Neuro: No tremors  HEENT: No pain  Cardiovascular: No chest pain, palpitations  Respiratory: + SOB, no cough  GI: No nausea, vomiting, abdominal pain  : No dysuria  Skin: no rash  Psych: no depression  Endocrine: no polyuria, polydipsia  Hem/lymph: no swelling  Osteoporosis: no fractures    ALL OTHER SYSTEMS REVIEWED AND NEGATIVE        PHYSICAL EXAM:  VITALS: T(C): 36.2 (01-20-19 @ 08:00)  T(F): 97.1 (01-20-19 @ 08:00), Max: 100.7 (01-19-19 @ 19:54)  HR: 76 (01-20-19 @ 08:00) (69 - 92)  BP: 132/64 (01-20-19 @ 08:00) (74/40 - 140/66)  RR:  (14 - 26)  SpO2:  (95% - 100%)  Wt(kg): --  GENERAL: NAD, well-groomed, well-developed  EYES: No proptosis, no lid lag, anicteric  HEENT:  Atraumatic, Normocephalic, moist mucous membranes  THYROID: Normal size, no palpable nodules  RESPIRATORY: Clear to auscultation bilaterally; No rales, rhonchi, wheezing, or rubs  CARDIOVASCULAR: Regular rate and rhythm; No murmurs; no peripheral edema  GI: Soft, nontender, non distended, normal bowel sounds  SKIN: Dry, intact, No rashes or lesions  MUSCULOSKELETAL: Full range of motion, normal strength  NEURO: sensation intact, extraocular movements intact, no tremor, normal reflexes  PSYCH: Alert and oriented x 3, normal affect, normal mood      POCT Blood Glucose.: 129 mg/dL (01-20-19 @ 09:06)  POCT Blood Glucose.: 117 mg/dL (01-20-19 @ 08:02)  POCT Blood Glucose.: 123 mg/dL (01-20-19 @ 07:05)  POCT Blood Glucose.: 114 mg/dL (01-20-19 @ 06:02)  POCT Blood Glucose.: 119 mg/dL (01-20-19 @ 04:56)  POCT Blood Glucose.: 107 mg/dL (01-20-19 @ 03:59)  POCT Blood Glucose.: 130 mg/dL (01-20-19 @ 03:02)  POCT Blood Glucose.: 130 mg/dL (01-20-19 @ 02:02)  POCT Blood Glucose.: 135 mg/dL (01-20-19 @ 01:02)  POCT Blood Glucose.: 171 mg/dL (01-20-19 @ 00:07)  POCT Blood Glucose.: 215 mg/dL (01-19-19 @ 22:59)  POCT Blood Glucose.: 240 mg/dL (01-19-19 @ 22:00)  POCT Blood Glucose.: 315 mg/dL (01-19-19 @ 21:03)  POCT Blood Glucose.: 321 mg/dL (01-19-19 @ 20:09)  POCT Blood Glucose.: 299 mg/dL (01-19-19 @ 18:58)  POCT Blood Glucose.: 254 mg/dL (01-19-19 @ 17:32)  POCT Blood Glucose.: 243 mg/dL (01-19-19 @ 15:13)                            8.8    7.8   )-----------( 226      ( 20 Jan 2019 06:17 )             27.0       01-20    136  |  93<L>  |  36<H>  ----------------------------<  114<H>  4.8   |  27  |  4.05<H>    EGFR if : 13<L>  EGFR if non : 11<L>    Ca    7.7<L>      01-20  Mg     2.2     01-20  Phos  4.8     01-20    TPro  5.8<L>  /  Alb  3.8  /  TBili  0.3  /  DBili  x   /  AST  95<H>  /  ALT  64<H>  /  AlkPhos  69  01-20      Hemoglobin A1C, Whole Blood: 8.6 % <H> [4.0 - 5.6] (11-16-18 @ 20:14)

## 2019-01-20 NOTE — PROGRESS NOTE ADULT - SUBJECTIVE AND OBJECTIVE BOX
CHIEF COMPLAINT:    Interval Events:    REVIEW OF SYSTEMS:  Constitutional: [ ] negative [ ] fevers [ ] chills [ ] weight loss [ ] weight gain  HEENT: [ ] negative [ ] dry eyes [ ] eye irritation [ ] postnasal drip [ ] nasal congestion  CV: [ ] negative  [ ] chest pain [ ] orthopnea [ ] palpitations [ ] murmur  Resp: [ ] negative [ ] cough [ ] shortness of breath [ ] dyspnea [ ] wheezing [ ] sputum [ ] hemoptysis  GI: [ ] negative [ ] nausea [ ] vomiting [ ] diarrhea [ ] constipation [ ] abd pain [ ] dysphagia   : [ ] negative [ ] dysuria [ ] nocturia [ ] hematuria [ ] increased urinary frequency  Musculoskeletal: [ ] negative [ ] back pain [ ] myalgias [ ] arthralgias [ ] fracture  Skin: [ ] negative [ ] rash [ ] itch  Neurological: [ ] negative [ ] headache [ ] dizziness [ ] syncope [ ] weakness [ ] numbness  Psychiatric: [ ] negative [ ] anxiety [ ] depression  Endocrine: [ ] negative [ ] diabetes [ ] thyroid problem  Hematologic/Lymphatic: [ ] negative [ ] anemia [ ] bleeding problem  Allergic/Immunologic: [ ] negative [ ] itchy eyes [ ] nasal discharge [ ] hives [ ] angioedema  [ ] All other systems negative  [ ] Unable to assess ROS because ________    OBJECTIVE:  ICU Vital Signs Last 24 Hrs  T(C): 36.9 (20 Jan 2019 04:00), Max: 38.2 (19 Jan 2019 19:54)  T(F): 98.5 (20 Jan 2019 04:00), Max: 100.7 (19 Jan 2019 19:54)  HR: 75 (20 Jan 2019 07:00) (69 - 92)  BP: 120/57 (20 Jan 2019 07:00) (74/40 - 140/66)  BP(mean): 82 (20 Jan 2019 07:00) (61 - 95)  ABP: --  ABP(mean): --  RR: 15 (20 Jan 2019 07:00) (14 - 26)  SpO2: 96% (20 Jan 2019 07:00) (95% - 100%)        01-19 @ 07:01  -  01-20 @ 07:00  --------------------------------------------------------  IN: 678.8 mL / OUT: 0 mL / NET: 678.8 mL      CAPILLARY BLOOD GLUCOSE      POCT Blood Glucose.: 123 mg/dL (20 Jan 2019 07:05)      PHYSICAL EXAM:  General:   HEENT:   Lymph Nodes:  Neck:   Respiratory:   Cardiovascular:   Abdomen:   Extremities:   Skin:   Neurological:  Psychiatry:    LINES:    HOSPITAL MEDICATIONS:  Standing Meds:  acetaminophen  IVPB .. 1000 milliGRAM(s) IV Intermittent once  dextrose 10%. 1000 milliLiter(s) IV Continuous <Continuous>  heparin  Injectable 5000 Unit(s) SubCutaneous every 8 hours  insulin Infusion 0.5 Unit(s)/Hr IV Continuous <Continuous>  norepinephrine Infusion 0.05 MICROgram(s)/kG/Min IV Continuous <Continuous>  ondansetron Injectable 4 milliGRAM(s) IV Push once  piperacillin/tazobactam IVPB. 3.375 Gram(s) IV Intermittent every 12 hours      PRN Meds:      LABS:                        8.8    7.8   )-----------( 226      ( 20 Jan 2019 06:17 )             27.0     Hgb Trend: 8.8<--, 8.6<--, 10.3<--, 9.9<--  01-20    136  |  93<L>  |  36<H>  ----------------------------<  114<H>  4.8   |  27  |  4.05<H>    Ca    7.7<L>      20 Jan 2019 06:16  Phos  4.8     01-20  Mg     2.2     01-20    TPro  5.8<L>  /  Alb  3.8  /  TBili  0.3  /  DBili  x   /  AST  95<H>  /  ALT  64<H>  /  AlkPhos  69  01-20    Creatinine Trend: 4.05<--, 3.74<--, 3.34<--, 3.08<--, 5.18<--  PT/INR - ( 19 Jan 2019 15:52 )   PT: 13.9 sec;   INR: 1.21 ratio         PTT - ( 19 Jan 2019 15:52 )  PTT:27.5 sec      Venous Blood Gas:  01-19 @ 19:22  7.36/33/52/18/78  VBG Lactate: 7.1  Venous Blood Gas:  01-19 @ 15:52  7.36/45/32/24/43  VBG Lactate: 4.2      MICROBIOLOGY:     RADIOLOGY:  [ ] Reviewed and interpreted by me    EKG: CHIEF COMPLAINT: DKA and shock     Interval Events:  Pt was titrated off levo.  Anion gap closed.  No other acute events.  ROS neg for CP, SOB, abd pain, N/V/D/C, dysuria/hematuria    REVIEW OF SYSTEMS:  Constitutional: [ ] negative [ ] fevers [ ] chills [ ] weight loss [ ] weight gain  HEENT: [ ] negative [ ] dry eyes [ ] eye irritation [ ] postnasal drip [ ] nasal congestion  CV: [ ] negative  [ ] chest pain [ ] orthopnea [ ] palpitations [ ] murmur  Resp: [ ] negative [ ] cough [ ] shortness of breath [ ] dyspnea [ ] wheezing [ ] sputum [ ] hemoptysis  GI: [ ] negative [ ] nausea [ ] vomiting [ ] diarrhea [ ] constipation [ ] abd pain [ ] dysphagia   : [ ] negative [ ] dysuria [ ] nocturia [ ] hematuria [ ] increased urinary frequency  Musculoskeletal: [ ] negative [ ] back pain [ ] myalgias [ ] arthralgias [ ] fracture  Skin: [ ] negative [ ] rash [ ] itch  Neurological: [ ] negative [ ] headache [ ] dizziness [ ] syncope [ ] weakness [ ] numbness  Psychiatric: [ ] negative [ ] anxiety [ ] depression  Endocrine: [ ] negative [ ] diabetes [ ] thyroid problem  Hematologic/Lymphatic: [ ] negative [ ] anemia [ ] bleeding problem  Allergic/Immunologic: [ ] negative [ ] itchy eyes [ ] nasal discharge [ ] hives [ ] angioedema  [ ] All other systems negative  [ ] Unable to assess ROS because ________    OBJECTIVE:  ICU Vital Signs Last 24 Hrs  T(C): 36.9 (20 Jan 2019 04:00), Max: 38.2 (19 Jan 2019 19:54)  T(F): 98.5 (20 Jan 2019 04:00), Max: 100.7 (19 Jan 2019 19:54)  HR: 75 (20 Jan 2019 07:00) (69 - 92)  BP: 120/57 (20 Jan 2019 07:00) (74/40 - 140/66)  BP(mean): 82 (20 Jan 2019 07:00) (61 - 95)  ABP: --  ABP(mean): --  RR: 15 (20 Jan 2019 07:00) (14 - 26)  SpO2: 96% (20 Jan 2019 07:00) (95% - 100%)        01-19 @ 07:01  -  01-20 @ 07:00  --------------------------------------------------------  IN: 678.8 mL / OUT: 0 mL / NET: 678.8 mL      CAPILLARY BLOOD GLUCOSE      POCT Blood Glucose.: 123 mg/dL (20 Jan 2019 07:05)      PHYSICAL EXAM:  General: well apparing  HEENT:   Lymph Nodes:  Neck:   Respiratory:   Cardiovascular:   Abdomen:   Extremities:   Skin:   Neurological:  Psychiatry:    LINES:    HOSPITAL MEDICATIONS:  Standing Meds:  acetaminophen  IVPB .. 1000 milliGRAM(s) IV Intermittent once  dextrose 10%. 1000 milliLiter(s) IV Continuous <Continuous>  heparin  Injectable 5000 Unit(s) SubCutaneous every 8 hours  insulin Infusion 0.5 Unit(s)/Hr IV Continuous <Continuous>  norepinephrine Infusion 0.05 MICROgram(s)/kG/Min IV Continuous <Continuous>  ondansetron Injectable 4 milliGRAM(s) IV Push once  piperacillin/tazobactam IVPB. 3.375 Gram(s) IV Intermittent every 12 hours      PRN Meds:      LABS:                        8.8    7.8   )-----------( 226      ( 20 Jan 2019 06:17 )             27.0     Hgb Trend: 8.8<--, 8.6<--, 10.3<--, 9.9<--  01-20    136  |  93<L>  |  36<H>  ----------------------------<  114<H>  4.8   |  27  |  4.05<H>    Ca    7.7<L>      20 Jan 2019 06:16  Phos  4.8     01-20  Mg     2.2     01-20    TPro  5.8<L>  /  Alb  3.8  /  TBili  0.3  /  DBili  x   /  AST  95<H>  /  ALT  64<H>  /  AlkPhos  69  01-20    Creatinine Trend: 4.05<--, 3.74<--, 3.34<--, 3.08<--, 5.18<--  PT/INR - ( 19 Jan 2019 15:52 )   PT: 13.9 sec;   INR: 1.21 ratio         PTT - ( 19 Jan 2019 15:52 )  PTT:27.5 sec      Venous Blood Gas:  01-19 @ 19:22  7.36/33/52/18/78  VBG Lactate: 7.1  Venous Blood Gas:  01-19 @ 15:52  7.36/45/32/24/43  VBG Lactate: 4.2      MICROBIOLOGY:     RADIOLOGY:  [ ] Reviewed and interpreted by me    EKG: CHIEF COMPLAINT: DKA and shock     Interval Events:  Pt was titrated off levo.  Anion gap closed.  No other acute events.  ROS neg for CP, SOB, abd pain, N/V/D/C, dysuria/hematuria    REVIEW OF SYSTEMS:  Constitutional: [ ] negative [ ] fevers [ ] chills [ ] weight loss [ ] weight gain  HEENT: [ ] negative [ ] dry eyes [ ] eye irritation [ ] postnasal drip [ ] nasal congestion  CV: [ ] negative  [ ] chest pain [ ] orthopnea [ ] palpitations [ ] murmur  Resp: [ ] negative [ ] cough [ ] shortness of breath [ ] dyspnea [ ] wheezing [ ] sputum [ ] hemoptysis  GI: [ ] negative [ ] nausea [ ] vomiting [ ] diarrhea [ ] constipation [ ] abd pain [ ] dysphagia   : [ ] negative [ ] dysuria [ ] nocturia [ ] hematuria [ ] increased urinary frequency  Musculoskeletal: [ ] negative [ ] back pain [ ] myalgias [ ] arthralgias [ ] fracture  Skin: [ ] negative [ ] rash [ ] itch  Neurological: [ ] negative [ ] headache [ ] dizziness [ ] syncope [ ] weakness [ ] numbness  Psychiatric: [ ] negative [ ] anxiety [ ] depression  Endocrine: [ ] negative [ ] diabetes [ ] thyroid problem  Hematologic/Lymphatic: [ ] negative [ ] anemia [ ] bleeding problem  Allergic/Immunologic: [ ] negative [ ] itchy eyes [ ] nasal discharge [ ] hives [ ] angioedema  [ ] All other systems negative  [ ] Unable to assess ROS because ________    OBJECTIVE:  ICU Vital Signs Last 24 Hrs  T(C): 36.9 (20 Jan 2019 04:00), Max: 38.2 (19 Jan 2019 19:54)  T(F): 98.5 (20 Jan 2019 04:00), Max: 100.7 (19 Jan 2019 19:54)  HR: 75 (20 Jan 2019 07:00) (69 - 92)  BP: 120/57 (20 Jan 2019 07:00) (74/40 - 140/66)  BP(mean): 82 (20 Jan 2019 07:00) (61 - 95)  ABP: --  ABP(mean): --  RR: 15 (20 Jan 2019 07:00) (14 - 26)  SpO2: 96% (20 Jan 2019 07:00) (95% - 100%)        01-19 @ 07:01  -  01-20 @ 07:00  --------------------------------------------------------  IN: 678.8 mL / OUT: 0 mL / NET: 678.8 mL      CAPILLARY BLOOD GLUCOSE      POCT Blood Glucose.: 123 mg/dL (20 Jan 2019 07:05)      PHYSICAL EXAM:  General: no distress and comfortable   Respiratory: CTA b/l, no wheeze or rhonchi   Cardiovascular: +S1/S2, no m/r/g  Abdomen: soft, non-distended, +BS   Extremities: no LE edema   Neurological: AAO x 4         HOSPITAL MEDICATIONS:  Standing Meds:  acetaminophen  IVPB .. 1000 milliGRAM(s) IV Intermittent once  dextrose 10%. 1000 milliLiter(s) IV Continuous <Continuous>  heparin  Injectable 5000 Unit(s) SubCutaneous every 8 hours  insulin Infusion 0.5 Unit(s)/Hr IV Continuous <Continuous>  norepinephrine Infusion 0.05 MICROgram(s)/kG/Min IV Continuous <Continuous>  ondansetron Injectable 4 milliGRAM(s) IV Push once  piperacillin/tazobactam IVPB. 3.375 Gram(s) IV Intermittent every 12 hours      PRN Meds:      LABS:                        8.8    7.8   )-----------( 226      ( 20 Jan 2019 06:17 )             27.0     Hgb Trend: 8.8<--, 8.6<--, 10.3<--, 9.9<--  01-20    136  |  93<L>  |  36<H>  ----------------------------<  114<H>  4.8   |  27  |  4.05<H>    Ca    7.7<L>      20 Jan 2019 06:16  Phos  4.8     01-20  Mg     2.2     01-20    TPro  5.8<L>  /  Alb  3.8  /  TBili  0.3  /  DBili  x   /  AST  95<H>  /  ALT  64<H>  /  AlkPhos  69  01-20    Creatinine Trend: 4.05<--, 3.74<--, 3.34<--, 3.08<--, 5.18<--  PT/INR - ( 19 Jan 2019 15:52 )   PT: 13.9 sec;   INR: 1.21 ratio         PTT - ( 19 Jan 2019 15:52 )  PTT:27.5 sec      Venous Blood Gas:  01-19 @ 19:22  7.36/33/52/18/78  VBG Lactate: 7.1  Venous Blood Gas:  01-19 @ 15:52  7.36/45/32/24/43  VBG Lactate: 4.2

## 2019-01-20 NOTE — CONSULT NOTE ADULT - SUBJECTIVE AND OBJECTIVE BOX
[facility]  [phone]  NEPHROLOGY      INITIAL CONSULT NOTE  --------------------------------------------------------------------------------  HPI:    Patient is a 62 yo F with ESRD (on HD M/Tu/Th/Sa), type 1 DM, CAD, HFrEF (EF 50% on TTE from 10/18), TIA, and PVD, who presented from HD with complaints of N/V and hyperglycemia. Patient previously on insulin pump, however as per report, patient was transitioned to basal/bolus regimen. Patient ran out of her lantus day PTA and therefore did not take it. Patient presented to HD today, where she was complainking of SOB. She was therefore given extra fluid removal today, and was also found to be hyperglycemic at that time. Patient continued to feel SOB after HD, and was brought to the ED at that time.    In the ED, patient found to be tachypneic (20), and hypotensive (as low as 70s/40s). Labs significant for leukocytopenia (WBC 3), hyperglycemia (269), elevated trops 201, with BHB of 6.3. Patient given 500cc NS bolus. Central line placed, and patient started on insulin gtt and pressors. Admitted to MICU for further care   last hd yesterday.   renal consult called.       PAST HISTORY  --------------------------------------------------------------------------------  PAST MEDICAL & SURGICAL HISTORY:  CHF (congestive heart failure): EF 40-45%  Subclavian vein stenosis, left: s/p stent  DKA, type 1: 1/2015  ACS (acute coronary syndrome): 1/2015 - cath revealed 100% ostial stenosis not amenable to PCI - medical management  TIA (transient ischemic attack): x 2 - 8-9 years ago prior to ASD/VSD repair  CAD (coronary artery disease): s/p stents  Gout: past  CVA (cerebral infarction): with no residual, 8 yrs ago, prior to heart surgery - ST memory loss  Peripheral vascular disease: occluded left fem-pop bypass 5/2015  Diabetes mellitus type 1: Insulin Dependent - [employer]  Minimed Paradigm Insulin Pump - Novolog  ESRD (end stage renal disease): dialysis  M, tue, thursday, saturday  Hyperlipidemia  Status post device closure of ASD: &quot;yvettehell&quot;  History of cardiac catheterization: 1/2015 - no intervention  S/P femoral-popliteal bypass surgery: L and R in 2013 with graft; 5/2015 CFA angioplasty left and ileofemoral endarterectomywith vein patch angioplasty of left fem-pop bypass graft  Multiple vascular surgery both leg, left fempop bypass revision 11/2015  AV (arteriovenous fistula): Left AV graft; revision with stent placement 2-3 years ago  S/P cholecystectomy    FAMILY HISTORY:  Family history of smoking  Family history of hypertension  Family history of cancer (Sibling)    PAST SOCIAL HISTORY: no tobacco use per pt no alcohol     ALLERGIES & MEDICATIONS  --------------------------------------------------------------------------------  Allergies    No Known Allergies    Intolerances      Standing Inpatient Medications  dextrose 5%. 1000 milliLiter(s) IV Continuous <Continuous>  dextrose 50% Injectable 12.5 Gram(s) IV Push once  dextrose 50% Injectable 25 Gram(s) IV Push once  dextrose 50% Injectable 25 Gram(s) IV Push once  heparin  Injectable 5000 Unit(s) SubCutaneous every 8 hours  insulin lispro (HumaLOG) corrective regimen sliding scale   SubCutaneous three times a day before meals  insulin lispro (HumaLOG) corrective regimen sliding scale   SubCutaneous at bedtime  insulin lispro Injectable (HumaLOG) 2 Unit(s) SubCutaneous three times a day before meals    PRN Inpatient Medications  dextrose 40% Gel 15 Gram(s) Oral once PRN  glucagon  Injectable 1 milliGRAM(s) IntraMuscular once PRN      REVIEW OF SYSTEMS  --------------------------------------------------------------------------------  Gen: No  fevers/chills   Skin: No rashes  Head/Eyes/Ears/Mouth: No headache; Normal hearing;  No sinus pain/discomfort, sore throat  Respiratory: No dyspnea, cough, wheezing, hemoptysis  CV: No chest pain, orthopnea  GI: +  abdominal pain, - diarrhea, + nausea, + vomiting, -  melena, hematochezia -   : No dysuria,   MSK: No joint pain/swelling; no back pain  Neuro: No dizziness/lightheadedness,   also with no edema     All other systems were reviewed and are negative, except as noted.    VITALS/PHYSICAL EXAM  --------------------------------------------------------------------------------  T(C): 36.2 (01-20-19 @ 12:00), Max: 38.2 (01-19-19 @ 19:54)  HR: 70 (01-20-19 @ 16:00) (64 - 92)  BP: 143/67 (01-20-19 @ 16:00) (86/49 - 143/67)  RR: 16 (01-20-19 @ 16:00) (11 - 26)  SpO2: 100% (01-20-19 @ 16:00) (95% - 100%)  Wt(kg): --  Height (cm): 162.56 (01-19-19 @ 19:54)  Weight (kg): 56.4 (01-19-19 @ 19:54)  BMI (kg/m2): 21.3 (01-19-19 @ 19:54)  BSA (m2): 1.6 (01-19-19 @ 19:54)      01-19-19 @ 07:01  -  01-20-19 @ 07:00  --------------------------------------------------------  IN: 679.3 mL / OUT: 0 mL / NET: 679.3 mL    01-20-19 @ 07:01  -  01-20-19 @ 17:41  --------------------------------------------------------  IN: 223 mL / OUT: 0 mL / NET: 223 mL      Physical Exam:  	Gen: Non toxic comfortable appearing   	no jvd  	Pulm: decrease bs  no rales or ronchi or wheezing  	CV: RRR, S1S2; no rub  	Back: No CVA tenderness  	Abd: +BS, soft, nontender/nondistended  	: No suprapubic tenderness  	UE: Warm, no cyanosis  no clubbing,  no edema;   	LE: Warm, no cyanosis  no clubbing, LLE 2+  edema  	Neuro: alert and oriented. speech coherent   	Psych: Normal affect and mood  	Skin: Warm, no decrease skin turgor   	Vascular access: + avf + bruit and thrill     LABS/STUDIES  --------------------------------------------------------------------------------              8.8    7.8   >-----------<  226      [01-20-19 @ 06:17]              27.0     134  |  89  |  43  ----------------------------<  237      [01-20-19 @ 15:56]  4.6   |  24  |  4.76        Ca     7.5     [01-20-19 @ 15:56]      Mg     2.1     [01-20-19 @ 15:56]      Phos  5.3     [01-20-19 @ 15:56]    TPro  5.8  /  Alb  3.8  /  TBili  0.3  /  DBili  x   /  AST  95  /  ALT  64  /  AlkPhos  69  [01-20-19 @ 00:44]    PT/INR: PT 13.9 , INR 1.21       [01-19-19 @ 15:52]  PTT: 27.5       [01-19-19 @ 15:52]          [01-19-19 @ 15:52]        [01-20-19 @ 07:16]    Creatinine Trend:  SCr 4.76 [01-20 @ 15:56]  SCr 4.30 [01-20 @ 10:15]  SCr 4.05 [01-20 @ 06:16]  SCr 3.74 [01-20 @ 00:44]  SCr 3.34 [01-19 @ 19:22]    Urinalysis - [06-04-17 @ 08:24]      Color Yellow / Appearance Clear / SG 1.013 / pH 8.5      Gluc 1000 / Ketone Negative  / Bili Negative / Urobili Negative       Blood Negative / Protein >600 / Leuk Est Small / Nitrite Negative      RBC 3-5 / WBC 6-10 / Hyaline  / Gran  / Sq Epi  / Non Sq Epi OCC / Bacteria       PTH -- (Ca 8.3)      [03-03-18 @ 08:53]   134  HbA1c 8.6      [11-16-18 @ 20:14]  TSH 0.71      [11-17-18 @ 08:25]  Lipid: chol 113, TG 86, HDL 59, LDL 37      [04-30-18 @ 06:44]    HBsAb <3.0      [12-18-18 @ 02:39]  HBsAb Nonreact      [05-02-15 @ 15:35]  HBsAg Nonreact      [12-18-18 @ 02:39]  HBcAb Nonreact      [12-18-18 @ 02:39]  HCV 0.10, Nonreact      [12-18-18 @ 02:39]

## 2019-01-21 LAB
ALBUMIN SERPL ELPH-MCNC: 3.6 G/DL — SIGNIFICANT CHANGE UP (ref 3.3–5)
ALP SERPL-CCNC: 74 U/L — SIGNIFICANT CHANGE UP (ref 40–120)
ALT FLD-CCNC: 77 U/L — HIGH (ref 10–45)
ANION GAP SERPL CALC-SCNC: 18 MMOL/L — HIGH (ref 5–17)
ANION GAP SERPL CALC-SCNC: 19 MMOL/L — HIGH (ref 5–17)
AST SERPL-CCNC: 82 U/L — HIGH (ref 10–40)
BASOPHILS # BLD AUTO: 0 K/UL — SIGNIFICANT CHANGE UP (ref 0–0.2)
BASOPHILS NFR BLD AUTO: 0.6 % — SIGNIFICANT CHANGE UP (ref 0–2)
BILIRUB SERPL-MCNC: 0.2 MG/DL — SIGNIFICANT CHANGE UP (ref 0.2–1.2)
BUN SERPL-MCNC: 48 MG/DL — HIGH (ref 7–23)
BUN SERPL-MCNC: 50 MG/DL — HIGH (ref 7–23)
CALCIUM SERPL-MCNC: 7.7 MG/DL — LOW (ref 8.4–10.5)
CALCIUM SERPL-MCNC: 7.8 MG/DL — LOW (ref 8.4–10.5)
CHLORIDE SERPL-SCNC: 89 MMOL/L — LOW (ref 96–108)
CHLORIDE SERPL-SCNC: 90 MMOL/L — LOW (ref 96–108)
CO2 SERPL-SCNC: 25 MMOL/L — SIGNIFICANT CHANGE UP (ref 22–31)
CO2 SERPL-SCNC: 25 MMOL/L — SIGNIFICANT CHANGE UP (ref 22–31)
CREAT SERPL-MCNC: 5.22 MG/DL — HIGH (ref 0.5–1.3)
CREAT SERPL-MCNC: 5.65 MG/DL — HIGH (ref 0.5–1.3)
EOSINOPHIL # BLD AUTO: 0.1 K/UL — SIGNIFICANT CHANGE UP (ref 0–0.5)
EOSINOPHIL NFR BLD AUTO: 1.6 % — SIGNIFICANT CHANGE UP (ref 0–6)
GAS PNL BLDV: SIGNIFICANT CHANGE UP
GLUCOSE BLDC GLUCOMTR-MCNC: 147 MG/DL — HIGH (ref 70–99)
GLUCOSE BLDC GLUCOMTR-MCNC: 179 MG/DL — HIGH (ref 70–99)
GLUCOSE BLDC GLUCOMTR-MCNC: 201 MG/DL — HIGH (ref 70–99)
GLUCOSE BLDC GLUCOMTR-MCNC: 293 MG/DL — HIGH (ref 70–99)
GLUCOSE BLDC GLUCOMTR-MCNC: 342 MG/DL — HIGH (ref 70–99)
GLUCOSE SERPL-MCNC: 265 MG/DL — HIGH (ref 70–99)
GLUCOSE SERPL-MCNC: 276 MG/DL — HIGH (ref 70–99)
HCT VFR BLD CALC: 27.4 % — LOW (ref 34.5–45)
HGB BLD-MCNC: 9.3 G/DL — LOW (ref 11.5–15.5)
LYMPHOCYTES # BLD AUTO: 0.8 K/UL — LOW (ref 1–3.3)
LYMPHOCYTES # BLD AUTO: 12.5 % — LOW (ref 13–44)
MAGNESIUM SERPL-MCNC: 2.3 MG/DL — SIGNIFICANT CHANGE UP (ref 1.6–2.6)
MCHC RBC-ENTMCNC: 33.8 GM/DL — SIGNIFICANT CHANGE UP (ref 32–36)
MCHC RBC-ENTMCNC: 34.9 PG — HIGH (ref 27–34)
MCV RBC AUTO: 103 FL — HIGH (ref 80–100)
MONOCYTES # BLD AUTO: 0.4 K/UL — SIGNIFICANT CHANGE UP (ref 0–0.9)
MONOCYTES NFR BLD AUTO: 7 % — SIGNIFICANT CHANGE UP (ref 2–14)
NEUTROPHILS # BLD AUTO: 5 K/UL — SIGNIFICANT CHANGE UP (ref 1.8–7.4)
NEUTROPHILS NFR BLD AUTO: 78.3 % — HIGH (ref 43–77)
PHOSPHATE SERPL-MCNC: 5.6 MG/DL — HIGH (ref 2.5–4.5)
PLATELET # BLD AUTO: 205 K/UL — SIGNIFICANT CHANGE UP (ref 150–400)
POTASSIUM SERPL-MCNC: 4.7 MMOL/L — SIGNIFICANT CHANGE UP (ref 3.5–5.3)
POTASSIUM SERPL-MCNC: 4.9 MMOL/L — SIGNIFICANT CHANGE UP (ref 3.5–5.3)
POTASSIUM SERPL-SCNC: 4.7 MMOL/L — SIGNIFICANT CHANGE UP (ref 3.5–5.3)
POTASSIUM SERPL-SCNC: 4.9 MMOL/L — SIGNIFICANT CHANGE UP (ref 3.5–5.3)
PROT SERPL-MCNC: 5.8 G/DL — LOW (ref 6–8.3)
RBC # BLD: 2.65 M/UL — LOW (ref 3.8–5.2)
RBC # FLD: 13.9 % — SIGNIFICANT CHANGE UP (ref 10.3–14.5)
SODIUM SERPL-SCNC: 133 MMOL/L — LOW (ref 135–145)
SODIUM SERPL-SCNC: 133 MMOL/L — LOW (ref 135–145)
VANCOMYCIN FLD-MCNC: 12.7 UG/ML — SIGNIFICANT CHANGE UP
WBC # BLD: 6.3 K/UL — SIGNIFICANT CHANGE UP (ref 3.8–10.5)
WBC # FLD AUTO: 6.3 K/UL — SIGNIFICANT CHANGE UP (ref 3.8–10.5)

## 2019-01-21 PROCEDURE — 99232 SBSQ HOSP IP/OBS MODERATE 35: CPT | Mod: GC

## 2019-01-21 RX ORDER — ERYTHROPOIETIN 10000 [IU]/ML
10000 INJECTION, SOLUTION INTRAVENOUS; SUBCUTANEOUS ONCE
Qty: 0 | Refills: 0 | Status: COMPLETED | OUTPATIENT
Start: 2019-01-21 | End: 2019-01-21

## 2019-01-21 RX ORDER — INSULIN LISPRO 100/ML
VIAL (ML) SUBCUTANEOUS
Qty: 0 | Refills: 0 | Status: DISCONTINUED | OUTPATIENT
Start: 2019-01-21 | End: 2019-01-22

## 2019-01-21 RX ORDER — INSULIN GLARGINE 100 [IU]/ML
8 INJECTION, SOLUTION SUBCUTANEOUS ONCE
Qty: 0 | Refills: 0 | Status: COMPLETED | OUTPATIENT
Start: 2019-01-21 | End: 2019-01-21

## 2019-01-21 RX ORDER — INSULIN GLARGINE 100 [IU]/ML
8 INJECTION, SOLUTION SUBCUTANEOUS AT BEDTIME
Qty: 0 | Refills: 0 | Status: DISCONTINUED | OUTPATIENT
Start: 2019-01-21 | End: 2019-01-21

## 2019-01-21 RX ORDER — INSULIN LISPRO 100/ML
3 VIAL (ML) SUBCUTANEOUS
Qty: 0 | Refills: 0 | Status: DISCONTINUED | OUTPATIENT
Start: 2019-01-21 | End: 2019-01-24

## 2019-01-21 RX ADMIN — SEVELAMER CARBONATE 800 MILLIGRAM(S): 2400 POWDER, FOR SUSPENSION ORAL at 21:13

## 2019-01-21 RX ADMIN — ERYTHROPOIETIN 10000 UNIT(S): 10000 INJECTION, SOLUTION INTRAVENOUS; SUBCUTANEOUS at 11:18

## 2019-01-21 RX ADMIN — HEPARIN SODIUM 5000 UNIT(S): 5000 INJECTION INTRAVENOUS; SUBCUTANEOUS at 21:13

## 2019-01-21 RX ADMIN — CLOPIDOGREL BISULFATE 75 MILLIGRAM(S): 75 TABLET, FILM COATED ORAL at 11:29

## 2019-01-21 RX ADMIN — SEVELAMER CARBONATE 800 MILLIGRAM(S): 2400 POWDER, FOR SUSPENSION ORAL at 17:38

## 2019-01-21 RX ADMIN — Medication 3 UNIT(S): at 17:32

## 2019-01-21 RX ADMIN — SENNA PLUS 2 TABLET(S): 8.6 TABLET ORAL at 21:13

## 2019-01-21 RX ADMIN — OXYCODONE AND ACETAMINOPHEN 1 TABLET(S): 5; 325 TABLET ORAL at 14:00

## 2019-01-21 RX ADMIN — Medication 4: at 09:18

## 2019-01-21 RX ADMIN — SEVELAMER CARBONATE 800 MILLIGRAM(S): 2400 POWDER, FOR SUSPENSION ORAL at 09:18

## 2019-01-21 RX ADMIN — Medication 81 MILLIGRAM(S): at 11:29

## 2019-01-21 RX ADMIN — DULOXETINE HYDROCHLORIDE 60 MILLIGRAM(S): 30 CAPSULE, DELAYED RELEASE ORAL at 11:27

## 2019-01-21 RX ADMIN — INSULIN GLARGINE 8 UNIT(S): 100 INJECTION, SOLUTION SUBCUTANEOUS at 16:45

## 2019-01-21 RX ADMIN — Medication 2 UNIT(S): at 09:18

## 2019-01-21 RX ADMIN — ATORVASTATIN CALCIUM 20 MILLIGRAM(S): 80 TABLET, FILM COATED ORAL at 21:13

## 2019-01-21 RX ADMIN — Medication 2 UNIT(S): at 13:28

## 2019-01-21 RX ADMIN — CINACALCET 60 MILLIGRAM(S): 30 TABLET, FILM COATED ORAL at 11:27

## 2019-01-21 RX ADMIN — HEPARIN SODIUM 5000 UNIT(S): 5000 INJECTION INTRAVENOUS; SUBCUTANEOUS at 13:32

## 2019-01-21 RX ADMIN — OXYCODONE AND ACETAMINOPHEN 1 TABLET(S): 5; 325 TABLET ORAL at 13:30

## 2019-01-21 RX ADMIN — OXYCODONE AND ACETAMINOPHEN 1 TABLET(S): 5; 325 TABLET ORAL at 00:13

## 2019-01-21 RX ADMIN — Medication 2: at 17:32

## 2019-01-21 RX ADMIN — Medication 1 TABLET(S): at 11:30

## 2019-01-21 RX ADMIN — Medication 6: at 11:26

## 2019-01-21 NOTE — PROGRESS NOTE ADULT - SUBJECTIVE AND OBJECTIVE BOX
Chief Complaint: DM1    History: Patient was transitioned off insulin gtt to Lantus 6U around 12pm on 1/20 but has not received any lantus this AM    MEDICATIONS  (STANDING):  aspirin enteric coated 81 milliGRAM(s) Oral daily  atorvastatin 20 milliGRAM(s) Oral at bedtime  cinacalcet 60 milliGRAM(s) Oral daily  clopidogrel Tablet 75 milliGRAM(s) Oral daily  dextrose 5%. 1000 milliLiter(s) (50 mL/Hr) IV Continuous <Continuous>  dextrose 50% Injectable 12.5 Gram(s) IV Push once  dextrose 50% Injectable 25 Gram(s) IV Push once  dextrose 50% Injectable 25 Gram(s) IV Push once  DULoxetine 60 milliGRAM(s) Oral daily  heparin  Injectable 5000 Unit(s) SubCutaneous every 8 hours  insulin glargine Injectable (LANTUS) 6 Unit(s) SubCutaneous at bedtime  insulin lispro (HumaLOG) corrective regimen sliding scale   SubCutaneous at bedtime  insulin lispro (HumaLOG) corrective regimen sliding scale   SubCutaneous three times a day before meals  insulin lispro Injectable (HumaLOG) 2 Unit(s) SubCutaneous three times a day before meals  multivitamin 1 Tablet(s) Oral daily  senna 2 Tablet(s) Oral at bedtime  sevelamer hydrochloride 800 milliGRAM(s) Oral three times a day    MEDICATIONS  (PRN):  dextrose 40% Gel 15 Gram(s) Oral once PRN Blood Glucose LESS THAN 70 milliGRAM(s)/deciliter  glucagon  Injectable 1 milliGRAM(s) IntraMuscular once PRN Glucose LESS THAN 70 milligrams/deciliter  oxyCODONE    5 mG/acetaminophen 325 mG 1 Tablet(s) Oral every 4 hours PRN Moderate Pain (4 - 6)      Allergies    No Known Allergies    Intolerances      Review of Systems:  Constitutional: No fever  Eyes: No blurry vision  Neuro: No tremors  HEENT: No pain  Cardiovascular: No chest pain, palpitations  Respiratory: No SOB, no cough  GI: No nausea, vomiting, abdominal pain  : No dysuria  Skin: no rash  Psych: no depression  Endocrine: no polyuria, polydipsia  Hem/lymph: no swelling  Osteoporosis: no fractures    ALL OTHER SYSTEMS REVIEWED AND NEGATIVE        PHYSICAL EXAM:  VITALS: T(C): 36.6 (01-21-19 @ 14:35)  T(F): 97.9 (01-21-19 @ 14:35), Max: 98.2 (01-20-19 @ 20:00)  HR: 71 (01-21-19 @ 15:00) (61 - 78)  BP: 152/70 (01-21-19 @ 15:00) (121/60 - 169/71)  RR:  (12 - 29)  SpO2:  (89% - 100%)  Wt(kg): --  GENERAL: NAD, well-groomed, well-developed  EYES: No proptosis, no lid lag, anicteric  HEENT:  Atraumatic, Normocephalic, moist mucous membranes  THYROID: Normal size, no palpable nodules  RESPIRATORY: Clear to auscultation bilaterally; No rales, rhonchi, wheezing, or rubs  CARDIOVASCULAR: Regular rate and rhythm; No murmurs; no peripheral edema  GI: Soft, nontender, non distended, normal bowel sounds  SKIN: Dry, intact, No rashes or lesions  MUSCULOSKELETAL: Full range of motion, normal strength  NEURO: sensation intact, extraocular movements intact, no tremor, normal reflexes  PSYCH: Alert and oriented x 3, normal affect, normal mood  CUSHING'S SIGNS: no striae    POCT Blood Glucose.: 147 mg/dL (01-21-19 @ 13:27)  POCT Blood Glucose.: 293 mg/dL (01-21-19 @ 10:27)  POCT Blood Glucose.: 342 mg/dL (01-21-19 @ 08:59)  POCT Blood Glucose.: 240 mg/dL (01-20-19 @ 21:03)  POCT Blood Glucose.: 198 mg/dL (01-20-19 @ 17:48)  POCT Blood Glucose.: 232 mg/dL (01-20-19 @ 14:42)  POCT Blood Glucose.: 139 mg/dL (01-20-19 @ 12:04)  POCT Blood Glucose.: 140 mg/dL (01-20-19 @ 11:01)  POCT Blood Glucose.: 129 mg/dL (01-20-19 @ 09:06)  POCT Blood Glucose.: 117 mg/dL (01-20-19 @ 08:02)  POCT Blood Glucose.: 123 mg/dL (01-20-19 @ 07:05)  POCT Blood Glucose.: 114 mg/dL (01-20-19 @ 06:02)  POCT Blood Glucose.: 119 mg/dL (01-20-19 @ 04:56)  POCT Blood Glucose.: 107 mg/dL (01-20-19 @ 03:59)  POCT Blood Glucose.: 130 mg/dL (01-20-19 @ 03:02)  POCT Blood Glucose.: 130 mg/dL (01-20-19 @ 02:02)  POCT Blood Glucose.: 135 mg/dL (01-20-19 @ 01:02)  POCT Blood Glucose.: 171 mg/dL (01-20-19 @ 00:07)  POCT Blood Glucose.: 215 mg/dL (01-19-19 @ 22:59)  POCT Blood Glucose.: 240 mg/dL (01-19-19 @ 22:00)  POCT Blood Glucose.: 315 mg/dL (01-19-19 @ 21:03)  POCT Blood Glucose.: 321 mg/dL (01-19-19 @ 20:09)  POCT Blood Glucose.: 299 mg/dL (01-19-19 @ 18:58)  POCT Blood Glucose.: 254 mg/dL (01-19-19 @ 17:32)  POCT Blood Glucose.: 243 mg/dL (01-19-19 @ 15:13)      01-21    133<L>  |  90<L>  |  50<H>  ----------------------------<  265<H>  4.9   |  25  |  5.65<H>    EGFR if : 9<L>  EGFR if non : 7<L>    Ca    7.7<L>      01-21  Mg     2.3     01-21  Phos  5.6     01-21    TPro  5.8<L>  /  Alb  3.6  /  TBili  0.2  /  DBili  x   /  AST  82<H>  /  ALT  77<H>  /  AlkPhos  74  01-21          Thyroid Function Tests:      Hemoglobin A1C, Whole Blood: 8.6 % <H> [4.0 - 5.6] (11-16-18 @ 20:14)

## 2019-01-21 NOTE — PROGRESS NOTE ADULT - ASSESSMENT
63 year old woman with uncontrolled DM1 A1C 8.6 c/b ESRD on HD, neuropathy, retinopathy, CAD, CHF, PVD, TIA here with fluid overload and DKA in setting of running out of insulin.

## 2019-01-21 NOTE — PROGRESS NOTE ADULT - SUBJECTIVE AND OBJECTIVE BOX
[facility]   [phone]  DIALYSIS NOTE  Chief Complaint: ESRD/Ongoing hemodialysis requirement.    24 hour events/subjective:    states feels better. no sob         ALLERGIES & MEDICATIONS  --------------------------------------------------------------------------------  Allergies    No Known Allergies    Intolerances      Standing Inpatient Medications  aspirin enteric coated 81 milliGRAM(s) Oral daily  atorvastatin 20 milliGRAM(s) Oral at bedtime  cinacalcet 60 milliGRAM(s) Oral daily  clopidogrel Tablet 75 milliGRAM(s) Oral daily  dextrose 5%. 1000 milliLiter(s) IV Continuous <Continuous>  dextrose 50% Injectable 12.5 Gram(s) IV Push once  dextrose 50% Injectable 25 Gram(s) IV Push once  dextrose 50% Injectable 25 Gram(s) IV Push once  DULoxetine 60 milliGRAM(s) Oral daily  heparin  Injectable 5000 Unit(s) SubCutaneous every 8 hours  insulin glargine Injectable (LANTUS) 6 Unit(s) SubCutaneous at bedtime  insulin lispro (HumaLOG) corrective regimen sliding scale   SubCutaneous at bedtime  insulin lispro (HumaLOG) corrective regimen sliding scale   SubCutaneous three times a day before meals  insulin lispro Injectable (HumaLOG) 2 Unit(s) SubCutaneous three times a day before meals  multivitamin 1 Tablet(s) Oral daily  senna 2 Tablet(s) Oral at bedtime  sevelamer hydrochloride 800 milliGRAM(s) Oral three times a day    PRN Inpatient Medications  dextrose 40% Gel 15 Gram(s) Oral once PRN  glucagon  Injectable 1 milliGRAM(s) IntraMuscular once PRN  oxyCODONE    5 mG/acetaminophen 325 mG 1 Tablet(s) Oral every 4 hours PRN      REVIEW OF SYSTEMS  --------------------------------------------------------------------------------  no itching or rash  no fever or chill  no cp or palp   no sob or cough   no N/V/D/ no abd pain   ext no edema         VITALS/PHYSICAL EXAM  --------------------------------------------------------------------------------  T(C): 36.7 (01-21-19 @ 10:55), Max: 36.8 (01-20-19 @ 16:00)  HR: 72 (01-21-19 @ 12:00) (61 - 78)  BP: 150/75 (01-21-19 @ 12:00) (116/55 - 157/77)  RR: 12 (01-21-19 @ 12:00) (12 - 29)  SpO2: 100% (01-21-19 @ 12:00) (89% - 100%)  Wt(kg): --  Height (cm): 162.56 (01-19-19 @ 19:54)  Weight (kg): 56.4 (01-19-19 @ 19:54)  BMI (kg/m2): 21.3 (01-19-19 @ 19:54)  BSA (m2): 1.6 (01-19-19 @ 19:54)      01-20-19 @ 07:01  -  01-21-19 @ 07:00  --------------------------------------------------------  IN: 343 mL / OUT: 0 mL / NET: 343 mL    01-21-19 @ 07:01  -  01-21-19 @ 12:58  --------------------------------------------------------  IN: 240 mL / OUT: 0 mL / NET: 240 mL      Physical Exam:  		  Gen: Non toxic comfortable appearing   	no jvd  	Pulm: decrease bs  no rales or ronchi or wheezing  	CV: RRR, S1S2; no rub  	Back: No CVA tenderness  	Abd: +BS, soft, nontender/nondistended  	: No suprapubic tenderness  	UE: Warm, no cyanosis  no clubbing,  no edema;   	LE: Warm, no cyanosis  no clubbing, LLE 2+  edema  	Neuro: alert and oriented. speech coherent   	  	  	  LABS/STUDIES  --------------------------------------------------------------------------------              9.3    6.3   >-----------<  205      [01-21-19 @ 00:17]              27.4     133  |  90  |  50  ----------------------------<  265      [01-21-19 @ 06:25]  4.9   |  25  |  5.65        Ca     7.7     [01-21-19 @ 06:25]      Mg     2.3     [01-21-19 @ 00:17]      Phos  5.6     [01-21-19 @ 00:17]    TPro  5.8  /  Alb  3.6  /  TBili  0.2  /  DBili  x   /  AST  82  /  ALT  77  /  AlkPhos  74  [01-21-19 @ 00:17]    PT/INR: PT 13.9 , INR 1.21       [01-19-19 @ 15:52]  PTT: 27.5       [01-19-19 @ 15:52]          [01-19-19 @ 15:52]        [01-20-19 @ 07:16]            imp/suggest: ESRD      Hemodialysis Prescription:  	Access:  	Dialyzer: revaclear   	Blood Flow (mL/Min): 400  	Dialysate Flow (mL/Min): 600  	Target UF (Liters):  	Treatment Time:  	Potassium:   	Calcium: 2.5  	  YOLANDA    Vitamin D     continue with hd   see hd flow sheet

## 2019-01-21 NOTE — PROGRESS NOTE ADULT - ASSESSMENT
62 yo F with ESRD (on HD M/Tu/Th/Sa), type 1 DM, CAD, HFrEF (EF 50% on TTE from 10/18), TIA, and PVD, who presented from HD with complaints of N/V and hyperglycemia. In the ED, patient found to be tachypneic (20), and hypotensive (as low as 70s/40s). Labs significant for leukocytopenia (WBC 3), hyperglycemia (269), elevated trops 201, with BHB of 6.3. s/p  500cc NS bolus. pt with recurrent dka    1- esrd  2- dka  3- pad  4- shpt           imp/suggest: ESRD      Hemodialysis Prescription:  	Access: avf  	Dialyzer: revaclear 300  	Blood Flow (mL/Min): 400  	Dialysate Flow (mL/Min): 600  	Target UF (Liters): 2 liter   	Treatment Time: 3.5 h  	Potassium: 2k   	Calcium: 2.5  	  YOLANDA    Vitamin D     continue with hd   see hd flow sheet

## 2019-01-21 NOTE — PROGRESS NOTE ADULT - ASSESSMENT
Patient is a 64 yo F with ESRD (on HD M/Tu/Th/Sa), type 1 DM, CAD, HFrEF (EF 50% on TTE from 10/18), TIA, and PVD, who presented from HD w/ DKA and severe sepsis now off pressors w/ closure of the AG being transitioned from insulin drip to lantus    # Neuro   - no active issues    # Cardiac  - no active issues     # Respiratory   - no active issues     # Endocrine  - AG closed.  Started on Lantus and lispro as per endo recs    # Renal/electrolytes   - HD patient: M/T/Th/Sat; outpatient nephrologist is Dr. Ley who is not in [facility].  Consulted house nephro for dialysis     # Heme:   - pt had Hbg drop from 10.3 to 8.8; likely hemoconcentration.  Hemolytic workup neg (LDH elevated but haptoglobin wnl)     #ID  - was started on broad spectrum abx bc pt presented w/ severe sepsis that has no resolved.  No signs of infection, pt off pressors and lactate returned to normal so stopped abx   # Dispo  - will monitor pt until insulin drip d/c and transfer to floors Patient is a 62 yo F with ESRD (on HD M/Tu/Th/Sa), type 1 DM, CAD, HFrEF (EF 50% on TTE from 10/18), TIA, and PVD, who presented from HD w/ DKA and severe sepsis now off pressors w/ closure of the AG being transitioned from insulin drip to lantus    # Neuro   - no active issues    # Cardiac  - no active issues     # Respiratory   - no active issues     # Endocrine  - AG of 18.  On Lantus 6 and humalog 2 TID with meals as per endo recs    # Renal/electrolytes   - HD patient: M/T/Th/Sat; outpatient nephrologist is Dr. Ley who is not in [facility].     # Heme:   - pt had Hbg drop from 10.3 ->  8.8-> 9.3 ; likely 2/2 t to hemoconcentration, stable.    - Hemolytic workup neg (LDH elevated but haptoglobin wnl)     #ID  -  Off abx , No active signs of   off pressors, lactate WNL     # Dispo  -  stable to be transferred to   floors

## 2019-01-21 NOTE — PROGRESS NOTE ADULT - PROBLEM SELECTOR PLAN 1
-Recommend Lantus 8U STAT and increasing Humalog to 3U TIDAC plus low dose correctional scale before meals at bedtime  -Goal glucose 100-180  -Please ensure that Lantus is ordered appropriately at the same time every day as patient is prone to going into DKA  -Discussed with MICU NP.

## 2019-01-21 NOTE — CONSULT NOTE ADULT - SUBJECTIVE AND OBJECTIVE BOX
HPI:  Patient is a 64 yo F with ESRD (on HD M/Tu/Th/Sa), type 1 DM, CAD, HFrEF (EF 50% on TTE from 10/18), TIA, and PVD, who presented from HD with complaints of N/V and hyperglycemia. Patient previously on indulin pump, however as per report, patient was transitioned to basal/bolus regimen. Patient ran out of her lantus yesterday and therefore did not take it. Patient presented to HD today, where she was complainking of SOB. She was therefore given extra fluid removal today, and was also found to be hyperglycemic at that time. Patient continued to feel SOB after HD, and was brought to the ED at that time.    In the ED, patient found to be tachypneic (20), and hypotensive (as low as 70s/40s). Labs significant for leukocytopenia (WBC 3), hyperglycemia (269), elevated trops 201, with BHB of 6.3. Patient given 500cc NS bolus. Central line placed, and patient started on insulin gtt and pressors. Admitted to MICU for further care (19 Jan 2019 18:44)      PAST MEDICAL & SURGICAL HISTORY:  CHF (congestive heart failure): EF 40-45%  Subclavian vein stenosis, left: s/p stent  DKA, type 1: 1/2015  ACS (acute coronary syndrome): 1/2015 - cath revealed 100% ostial stenosis not amenable to PCI - medical management  TIA (transient ischemic attack): x 2 - 8-9 years ago prior to ASD/VSD repair  CAD (coronary artery disease): s/p stents  Gout: past  CVA (cerebral infarction): with no residual, 8 yrs ago, prior to heart surgery - ST memory loss  Peripheral vascular disease: occluded left fem-pop bypass 5/2015  Diabetes mellitus type 1: Insulin Dependent - [employer]  Minimed Paradigm Insulin Pump - Novolog  ESRD (end stage renal disease): dialysis  M, tue, thursday, saturday  Hyperlipidemia  Status post device closure of ASD: &quot;clamshell&quot;  History of cardiac catheterization: 1/2015 - no intervention  S/P femoral-popliteal bypass surgery: L and R in 2013 with graft; 5/2015 CFA angioplasty left and ileofemoral endarterectomywith vein patch angioplasty of left fem-pop bypass graft  Multiple vascular surgery both leg, left fempop bypass revision 11/2015  AV (arteriovenous fistula): Left AV graft; revision with stent placement 2-3 years ago  S/P cholecystectomy      Review of Systems:   CONSTITUTIONAL: No fever, weight loss, or fatigue  EYES: No eye pain, visual disturbances, or discharge  ENMT:  No difficulty hearing, tinnitus, vertigo; No sinus or throat pain  NECK: No pain or stiffness  BREASTS: No pain, masses, or nipple discharge  RESPIRATORY: No cough, wheezing, chills or hemoptysis; No shortness of breath  CARDIOVASCULAR: No chest pain, palpitations, dizziness, or leg swelling  GASTROINTESTINAL: No abdominal or epigastric pain. No nausea, vomiting, or hematemesis; No diarrhea or constipation. No melena or hematochezia.  GENITOURINARY: No dysuria, frequency, hematuria, or incontinence  NEUROLOGICAL: No headaches, memory loss, loss of strength, numbness, or tremors  SKIN: No itching, burning, rashes, or lesions   LYMPH NODES: No enlarged glands  ENDOCRINE: No heat or cold intolerance; No hair loss  MUSCULOSKELETAL: No joint pain or swelling; No muscle, back, or extremity pain  PSYCHIATRIC: No depression, anxiety, mood swings, or difficulty sleeping  HEME/LYMPH: No easy bruising, or bleeding gums  ALLERY AND IMMUNOLOGIC: No hives or eczema    Allergies    No Known Allergies    Intolerances        Social History:     FAMILY HISTORY:  Family history of smoking  Family history of hypertension  Family history of cancer (Sibling)      MEDICATIONS  (STANDING):  aspirin enteric coated 81 milliGRAM(s) Oral daily  atorvastatin 20 milliGRAM(s) Oral at bedtime  cinacalcet 60 milliGRAM(s) Oral daily  clopidogrel Tablet 75 milliGRAM(s) Oral daily  dextrose 5%. 1000 milliLiter(s) (50 mL/Hr) IV Continuous <Continuous>  dextrose 50% Injectable 12.5 Gram(s) IV Push once  dextrose 50% Injectable 25 Gram(s) IV Push once  dextrose 50% Injectable 25 Gram(s) IV Push once  DULoxetine 60 milliGRAM(s) Oral daily  heparin  Injectable 5000 Unit(s) SubCutaneous every 8 hours  insulin glargine Injectable (LANTUS) 6 Unit(s) SubCutaneous at bedtime  insulin lispro (HumaLOG) corrective regimen sliding scale   SubCutaneous at bedtime  insulin lispro (HumaLOG) corrective regimen sliding scale   SubCutaneous three times a day before meals  insulin lispro Injectable (HumaLOG) 2 Unit(s) SubCutaneous three times a day before meals  multivitamin 1 Tablet(s) Oral daily  senna 2 Tablet(s) Oral at bedtime  sevelamer hydrochloride 800 milliGRAM(s) Oral three times a day    MEDICATIONS  (PRN):  dextrose 40% Gel 15 Gram(s) Oral once PRN Blood Glucose LESS THAN 70 milliGRAM(s)/deciliter  glucagon  Injectable 1 milliGRAM(s) IntraMuscular once PRN Glucose LESS THAN 70 milligrams/deciliter  oxyCODONE    5 mG/acetaminophen 325 mG 1 Tablet(s) Oral every 4 hours PRN Moderate Pain (4 - 6)        CAPILLARY BLOOD GLUCOSE      POCT Blood Glucose.: 147 mg/dL (21 Jan 2019 13:27)  POCT Blood Glucose.: 293 mg/dL (21 Jan 2019 10:27)  POCT Blood Glucose.: 342 mg/dL (21 Jan 2019 08:59)  POCT Blood Glucose.: 240 mg/dL (20 Jan 2019 21:03)  POCT Blood Glucose.: 198 mg/dL (20 Jan 2019 17:48)    I&O's Summary    20 Jan 2019 07:01  -  21 Jan 2019 07:00  --------------------------------------------------------  IN: 343 mL / OUT: 0 mL / NET: 343 mL    21 Jan 2019 07:01  -  21 Jan 2019 15:51  --------------------------------------------------------  IN: 640 mL / OUT: 2000 mL / NET: -1360 mL        PHYSICAL EXAM:  GENERAL: NAD  HEAD:  Atraumatic, Normocephalic  EYES: EOMI, PERRLA, conjunctiva and sclera clear  NECK: Supple, No JVD  CHEST/LUNG: Clear to auscultation bilaterally; No wheeze  HEART: Regular rate and rhythm; No murmurs, rubs, or gallops  ABDOMEN: Soft, Nontender, Nondistended; Bowel sounds present  EXTREMITIES:  2+ Peripheral Pulses, No clubbing, cyanosis, or edema  PSYCH: anxious  NEUROLOGY: non-focal  SKIN: No rashes or lesions    LABS:                        9.3    6.3   )-----------( 205      ( 21 Jan 2019 00:17 )             27.4     01-21    133<L>  |  90<L>  |  50<H>  ----------------------------<  265<H>  4.9   |  25  |  5.65<H>    Ca    7.7<L>      21 Jan 2019 06:25  Phos  5.6     01-21  Mg     2.3     01-21    TPro  5.8<L>  /  Alb  3.6  /  TBili  0.2  /  DBili  x   /  AST  82<H>  /  ALT  77<H>  /  AlkPhos  74  01-21    PT/INR - ( 19 Jan 2019 15:52 )   PT: 13.9 sec;   INR: 1.21 ratio         PTT - ( 19 Jan 2019 15:52 )  PTT:27.5 sec  CARDIAC MARKERS ( 19 Jan 2019 15:52 )  x     / x     / 431 U/L / x     / 4.7 ng/mL          RADIOLOGY & ADDITIONAL TESTS:    Imaging Personally Reviewed:    Consultant(s) Notes Reviewed:      Care Discussed with Consultants/Other Providers:

## 2019-01-21 NOTE — PROGRESS NOTE ADULT - SUBJECTIVE AND OBJECTIVE BOX
Cardiovascular Disease Progress Note    Overnight events: No acute events overnight.  no cp/sob/pnd/orthopnea  Otherwise review of systems negative    Objective Findings:  T(C): 36.8 (19 @ 04:00), Max: 36.8 (19 @ 16:00)  HR: 69 (19 @ 07:00) (61 - 78)  BP: 156/74 (19 @ 07:00) (116/55 - 156/74)  RR: 13 (19 @ 07:00) (11 - 29)  SpO2: 100% (19 @ 07:00) (89% - 100%)  Wt(kg): --  Daily     Daily Weight in k.4 (2019 04:00)      Physical Exam:  Gen: NAD  HEENT: EOMI  CV: RRR, normal S1 + S2, no m/r/g  Lungs: CTAB  Abd: soft, non-tender  Ext: No edema    Telemetry: nsr    Laboratory Data:                        9.3    6.3   )-----------( 205      ( 2019 00:17 )             27.4         133<L>  |  90<L>  |  50<H>  ----------------------------<  265<H>  4.9   |  25  |  5.65<H>    Ca    7.7<L>      2019 06:25  Phos  5.6       Mg     2.3         TPro  5.8<L>  /  Alb  3.6  /  TBili  0.2  /  DBili  x   /  AST  82<H>  /  ALT  77<H>  /  AlkPhos  74      PT/INR - ( 2019 15:52 )   PT: 13.9 sec;   INR: 1.21 ratio         PTT - ( 2019 15:52 )  PTT:27.5 sec  CARDIAC MARKERS ( 2019 15:52 )  x     / x     / 431 U/L / x     / 4.7 ng/mL          Inpatient Medications:  MEDICATIONS  (STANDING):  aspirin enteric coated 81 milliGRAM(s) Oral daily  atorvastatin 20 milliGRAM(s) Oral at bedtime  cinacalcet 60 milliGRAM(s) Oral daily  clopidogrel Tablet 75 milliGRAM(s) Oral daily  dextrose 5%. 1000 milliLiter(s) (50 mL/Hr) IV Continuous <Continuous>  dextrose 50% Injectable 12.5 Gram(s) IV Push once  dextrose 50% Injectable 25 Gram(s) IV Push once  dextrose 50% Injectable 25 Gram(s) IV Push once  DULoxetine 60 milliGRAM(s) Oral daily  heparin  Injectable 5000 Unit(s) SubCutaneous every 8 hours  insulin glargine Injectable (LANTUS) 6 Unit(s) SubCutaneous at bedtime  insulin lispro (HumaLOG) corrective regimen sliding scale   SubCutaneous three times a day before meals  insulin lispro (HumaLOG) corrective regimen sliding scale   SubCutaneous at bedtime  insulin lispro Injectable (HumaLOG) 2 Unit(s) SubCutaneous three times a day before meals  multivitamin 1 Tablet(s) Oral daily  senna 2 Tablet(s) Oral at bedtime  sevelamer hydrochloride 800 milliGRAM(s) Oral three times a day      Assessment:  - DKA in the setting of medication non-compliance and possible sepsis  -hypotension, shock -> likely vasodilatory  -ischemic cardiomyopathy, cad s/p multiple stents  -pulmonary edema  -esrd on hd  -PAD s/p prior intervention   -remote TIA        Recs:  -appreciate MICU care  -s/p empiric course of broad spectrum abx and treatment of presumed sepsis. f/u cx  -would obtain TTE to r/o cardiac component of her hypotension. lalctate normalized. venous sat 76% argues against cardiogenic component  -management of DKA per MICU and endo --> resolved  -no true ischemic sx. ekg with nonspecific ST changes. would defer ischemic work up for now unless  -c/w asa, plavix, statin for ischemic cardiomyopathy/CAD/PAD if not contraindicated  -would resume bb and hydralazine for ischemic cardiomyopathy and HTN when tolerates  -hd per renal  -dvt ppx        Over 25 minutes spent on total encounter; more than 50% of the visit was spent counseling and/or coordinating care by the attending physician.      Julián Biswas MD   Cardiovascular Disease  [phone]

## 2019-01-22 ENCOUNTER — TRANSCRIPTION ENCOUNTER (OUTPATIENT)
Age: 64
End: 2019-01-22

## 2019-01-22 DIAGNOSIS — E10.11 TYPE 1 DIABETES MELLITUS WITH KETOACIDOSIS WITH COMA: ICD-10-CM

## 2019-01-22 LAB
ANION GAP SERPL CALC-SCNC: 19 MMOL/L — HIGH (ref 5–17)
ANISOCYTOSIS BLD QL: SLIGHT — SIGNIFICANT CHANGE UP
BASOPHILS # BLD AUTO: 0.02 K/UL — SIGNIFICANT CHANGE UP (ref 0–0.2)
BASOPHILS NFR BLD AUTO: 0.4 % — SIGNIFICANT CHANGE UP (ref 0–2)
BUN SERPL-MCNC: 24 MG/DL — HIGH (ref 7–23)
CALCIUM SERPL-MCNC: 7.9 MG/DL — LOW (ref 8.4–10.5)
CHLORIDE SERPL-SCNC: 95 MMOL/L — LOW (ref 96–108)
CO2 SERPL-SCNC: 24 MMOL/L — SIGNIFICANT CHANGE UP (ref 22–31)
CREAT SERPL-MCNC: 3.84 MG/DL — HIGH (ref 0.5–1.3)
EOSINOPHIL # BLD AUTO: 0.06 K/UL — SIGNIFICANT CHANGE UP (ref 0–0.5)
EOSINOPHIL NFR BLD AUTO: 1.3 % — SIGNIFICANT CHANGE UP (ref 0–6)
GLUCOSE BLDC GLUCOMTR-MCNC: 119 MG/DL — HIGH (ref 70–99)
GLUCOSE BLDC GLUCOMTR-MCNC: 132 MG/DL — HIGH (ref 70–99)
GLUCOSE BLDC GLUCOMTR-MCNC: 169 MG/DL — HIGH (ref 70–99)
GLUCOSE BLDC GLUCOMTR-MCNC: 303 MG/DL — HIGH (ref 70–99)
GLUCOSE BLDC GLUCOMTR-MCNC: 91 MG/DL — SIGNIFICANT CHANGE UP (ref 70–99)
GLUCOSE SERPL-MCNC: 270 MG/DL — HIGH (ref 70–99)
HCT VFR BLD CALC: 29 % — LOW (ref 34.5–45)
HGB BLD-MCNC: 9 G/DL — LOW (ref 11.5–15.5)
IMM GRANULOCYTES NFR BLD AUTO: 0.4 % — SIGNIFICANT CHANGE UP (ref 0–1.5)
LYMPHOCYTES # BLD AUTO: 0.5 K/UL — LOW (ref 1–3.3)
LYMPHOCYTES # BLD AUTO: 10.5 % — LOW (ref 13–44)
MACROCYTES BLD QL: SLIGHT — SIGNIFICANT CHANGE UP
MANUAL SMEAR VERIFICATION: SIGNIFICANT CHANGE UP
MCHC RBC-ENTMCNC: 31 GM/DL — LOW (ref 32–36)
MCHC RBC-ENTMCNC: 32.8 PG — SIGNIFICANT CHANGE UP (ref 27–34)
MCV RBC AUTO: 105.8 FL — HIGH (ref 80–100)
MONOCYTES # BLD AUTO: 0.33 K/UL — SIGNIFICANT CHANGE UP (ref 0–0.9)
MONOCYTES NFR BLD AUTO: 6.9 % — SIGNIFICANT CHANGE UP (ref 2–14)
NEUTROPHILS # BLD AUTO: 3.85 K/UL — SIGNIFICANT CHANGE UP (ref 1.8–7.4)
NEUTROPHILS NFR BLD AUTO: 80.5 % — HIGH (ref 43–77)
PLAT MORPH BLD: NORMAL — SIGNIFICANT CHANGE UP
PLATELET # BLD AUTO: 201 K/UL — SIGNIFICANT CHANGE UP (ref 150–400)
POTASSIUM SERPL-MCNC: 3.9 MMOL/L — SIGNIFICANT CHANGE UP (ref 3.5–5.3)
POTASSIUM SERPL-SCNC: 3.9 MMOL/L — SIGNIFICANT CHANGE UP (ref 3.5–5.3)
RBC # BLD: 2.74 M/UL — LOW (ref 3.8–5.2)
RBC # FLD: 15 % — HIGH (ref 10.3–14.5)
RBC BLD AUTO: ABNORMAL
SODIUM SERPL-SCNC: 138 MMOL/L — SIGNIFICANT CHANGE UP (ref 135–145)
WBC # BLD: 4.78 K/UL — SIGNIFICANT CHANGE UP (ref 3.8–10.5)
WBC # FLD AUTO: 4.78 K/UL — SIGNIFICANT CHANGE UP (ref 3.8–10.5)

## 2019-01-22 PROCEDURE — 99233 SBSQ HOSP IP/OBS HIGH 50: CPT

## 2019-01-22 RX ORDER — METOPROLOL TARTRATE 50 MG
50 TABLET ORAL DAILY
Qty: 0 | Refills: 0 | Status: DISCONTINUED | OUTPATIENT
Start: 2019-01-22 | End: 2019-01-25

## 2019-01-22 RX ORDER — HYDRALAZINE HCL 50 MG
25 TABLET ORAL EVERY 8 HOURS
Qty: 0 | Refills: 0 | Status: DISCONTINUED | OUTPATIENT
Start: 2019-01-22 | End: 2019-01-25

## 2019-01-22 RX ORDER — INSULIN GLARGINE 100 [IU]/ML
9 INJECTION, SOLUTION SUBCUTANEOUS
Qty: 0 | Refills: 0 | Status: DISCONTINUED | OUTPATIENT
Start: 2019-01-22 | End: 2019-01-23

## 2019-01-22 RX ORDER — INSULIN GLARGINE 100 [IU]/ML
9 INJECTION, SOLUTION SUBCUTANEOUS
Qty: 0 | Refills: 0 | Status: DISCONTINUED | OUTPATIENT
Start: 2019-01-22 | End: 2019-01-22

## 2019-01-22 RX ORDER — INSULIN LISPRO 100/ML
VIAL (ML) SUBCUTANEOUS
Qty: 0 | Refills: 0 | Status: DISCONTINUED | OUTPATIENT
Start: 2019-01-22 | End: 2019-01-25

## 2019-01-22 RX ORDER — CHLORHEXIDINE GLUCONATE 213 G/1000ML
1 SOLUTION TOPICAL
Qty: 0 | Refills: 0 | Status: DISCONTINUED | OUTPATIENT
Start: 2019-01-22 | End: 2019-01-25

## 2019-01-22 RX ADMIN — Medication 3 UNIT(S): at 13:47

## 2019-01-22 RX ADMIN — Medication 1 TABLET(S): at 13:49

## 2019-01-22 RX ADMIN — Medication 8: at 08:28

## 2019-01-22 RX ADMIN — DULOXETINE HYDROCHLORIDE 60 MILLIGRAM(S): 30 CAPSULE, DELAYED RELEASE ORAL at 13:47

## 2019-01-22 RX ADMIN — CINACALCET 60 MILLIGRAM(S): 30 TABLET, FILM COATED ORAL at 13:48

## 2019-01-22 RX ADMIN — OXYCODONE AND ACETAMINOPHEN 1 TABLET(S): 5; 325 TABLET ORAL at 21:55

## 2019-01-22 RX ADMIN — OXYCODONE AND ACETAMINOPHEN 1 TABLET(S): 5; 325 TABLET ORAL at 21:21

## 2019-01-22 RX ADMIN — Medication 3 UNIT(S): at 17:02

## 2019-01-22 RX ADMIN — CHLORHEXIDINE GLUCONATE 1 APPLICATION(S): 213 SOLUTION TOPICAL at 17:10

## 2019-01-22 RX ADMIN — CLOPIDOGREL BISULFATE 75 MILLIGRAM(S): 75 TABLET, FILM COATED ORAL at 13:49

## 2019-01-22 RX ADMIN — Medication 25 MILLIGRAM(S): at 13:55

## 2019-01-22 RX ADMIN — Medication 3 UNIT(S): at 08:28

## 2019-01-22 RX ADMIN — Medication 25 MILLIGRAM(S): at 21:18

## 2019-01-22 RX ADMIN — SEVELAMER CARBONATE 800 MILLIGRAM(S): 2400 POWDER, FOR SUSPENSION ORAL at 13:50

## 2019-01-22 RX ADMIN — INSULIN GLARGINE 9 UNIT(S): 100 INJECTION, SOLUTION SUBCUTANEOUS at 17:58

## 2019-01-22 RX ADMIN — HEPARIN SODIUM 5000 UNIT(S): 5000 INJECTION INTRAVENOUS; SUBCUTANEOUS at 13:48

## 2019-01-22 RX ADMIN — Medication 50 MILLIGRAM(S): at 17:10

## 2019-01-22 RX ADMIN — SEVELAMER CARBONATE 800 MILLIGRAM(S): 2400 POWDER, FOR SUSPENSION ORAL at 05:41

## 2019-01-22 RX ADMIN — SEVELAMER CARBONATE 800 MILLIGRAM(S): 2400 POWDER, FOR SUSPENSION ORAL at 21:15

## 2019-01-22 RX ADMIN — Medication 81 MILLIGRAM(S): at 13:55

## 2019-01-22 RX ADMIN — ATORVASTATIN CALCIUM 20 MILLIGRAM(S): 80 TABLET, FILM COATED ORAL at 21:15

## 2019-01-22 RX ADMIN — SENNA PLUS 2 TABLET(S): 8.6 TABLET ORAL at 21:15

## 2019-01-22 NOTE — PROGRESS NOTE ADULT - ASSESSMENT
63 year old female w/uncontrolled T1DM multiple complications and comorbidities. Here with hyperglycemia /DKA due to running out of  insulin. Also found to have fluid overload and questionable sepsis.Tolerating POs with glycemic control improved for lunch. However, pt remains with fasting hyperglycemia.No hypoglycemia. States feeling much better. Will increase basal insulin slowly to improve fasting BG levels.   Pt was recently switched from insulin pump use to sq insulin and she states not getting a box of insulin pens but only one pen of Lantus and one of Humalog. Will need rxs for insulin pen boxes at time of discharge   High complexity case.

## 2019-01-22 NOTE — DISCHARGE NOTE ADULT - HOSPITAL COURSE
Patient is a 64 yo F with ESRD (on HD M/Tu/Th/Sa), type 1 DM, CAD, HFrEF (EF 50% on TTE from 10/18), TIA, and PVD, who presented from HD with complaints of N/V and hyperglycemia. Patient previously on indulin pump, however as per report, patient was transitioned to basal/bolus regimen. Patient ran out of her lantus yesterday and therefore did not take it. Patient presented to HD today, where she was complainking of SOB. She was therefore given extra fluid removal today, and was also found to be hyperglycemic at that time. Patient continued to feel SOB after HD, and was brought to the ED at that time.    Problem/Plan - 1:  ·  Problem: Diabetic ketoacidosis with coma associated with type 1 diabetes mellitus.  Plan: -Test BG ac and hs and 2 am every day  -Increase Lantus dose to 10 units   pt to f/u with pvt endocrinologist.    DKA- resolving. Endocrine follow re insulin pump.  ESRD- continue HD. Nephrology follow.  Observe off antibiotics  CAD stable.  Anxiety control  d/w

## 2019-01-22 NOTE — PROGRESS NOTE ADULT - SUBJECTIVE AND OBJECTIVE BOX
[facility]   [phone]  DIALYSIS NOTE  Chief Complaint: ESRD/Ongoing hemodialysis requirement. seen on hd    24 hour events/subjective:      states feels tired overall       ALLERGIES & MEDICATIONS  --------------------------------------------------------------------------------  Allergies    No Known Allergies    Intolerances      Standing Inpatient Medications  aspirin enteric coated 81 milliGRAM(s) Oral daily  atorvastatin 20 milliGRAM(s) Oral at bedtime  cinacalcet 60 milliGRAM(s) Oral daily  clopidogrel Tablet 75 milliGRAM(s) Oral daily  dextrose 5%. 1000 milliLiter(s) IV Continuous <Continuous>  dextrose 50% Injectable 12.5 Gram(s) IV Push once  dextrose 50% Injectable 25 Gram(s) IV Push once  dextrose 50% Injectable 25 Gram(s) IV Push once  DULoxetine 60 milliGRAM(s) Oral daily  heparin  Injectable 5000 Unit(s) SubCutaneous every 8 hours  hydrALAZINE 25 milliGRAM(s) Oral every 8 hours  insulin glargine Injectable (LANTUS) 9 Unit(s) SubCutaneous <User Schedule>  insulin lispro (HumaLOG) corrective regimen sliding scale   SubCutaneous at bedtime  insulin lispro (HumaLOG) corrective regimen sliding scale   SubCutaneous three times a day before meals  insulin lispro Injectable (HumaLOG) 3 Unit(s) SubCutaneous three times a day before meals  metoprolol succinate ER 50 milliGRAM(s) Oral daily  multivitamin 1 Tablet(s) Oral daily  senna 2 Tablet(s) Oral at bedtime  sevelamer hydrochloride 800 milliGRAM(s) Oral three times a day    PRN Inpatient Medications  dextrose 40% Gel 15 Gram(s) Oral once PRN  glucagon  Injectable 1 milliGRAM(s) IntraMuscular once PRN  oxyCODONE    5 mG/acetaminophen 325 mG 1 Tablet(s) Oral every 4 hours PRN      REVIEW OF SYSTEMS  --------------------------------------------------------------------------------  no itching or rash  no fever or chill  no cp or palp   no sob or cough   no N/V/D/ no abd pain   ext no edema         VITALS/PHYSICAL EXAM  --------------------------------------------------------------------------------  T(C): 36.5 (01-22-19 @ 05:52), Max: 36.8 (01-21-19 @ 16:20)  HR: 76 (01-22-19 @ 05:52) (69 - 76)  BP: 165/79 (01-22-19 @ 05:52) (145/70 - 169/71)  RR: 17 (01-22-19 @ 05:52) (12 - 26)  SpO2: 93% (01-22-19 @ 05:52) (93% - 100%)  Wt(kg): --  Height (cm): 162.56 (01-21-19 @ 16:20)  Weight (kg): 55.5 (01-21-19 @ 16:20)  BMI (kg/m2): 21 (01-21-19 @ 16:20)  BSA (m2): 1.59 (01-21-19 @ 16:20)      01-21-19 @ 07:01  -  01-22-19 @ 07:00  --------------------------------------------------------  IN: 990 mL / OUT: 2000 mL / NET: -1010 mL      Physical Exam:  		  Gen: Non toxic comfortable appearing   	no jvd  	Pulm: decrease bs  no rales or ronchi or wheezing  	CV: RRR, S1S2; no rub  	Back: No CVA tenderness  	Abd: +BS, soft, nontender/nondistended  	: No suprapubic tenderness  	UE: Warm, no cyanosis  no clubbing,  no edema;   	LE: Warm, no cyanosis  no clubbing, LLE 2+  edema  	Neuro: alert and oriented. speech coherent   	    LABS/STUDIES  --------------------------------------------------------------------------------              9.3    6.3   >-----------<  205      [01-21-19 @ 00:17]              27.4     133  |  90  |  50  ----------------------------<  265      [01-21-19 @ 06:25]  4.9   |  25  |  5.65        Ca     7.7     [01-21-19 @ 06:25]      Mg     2.3     [01-21-19 @ 00:17]      Phos  5.6     [01-21-19 @ 00:17]    TPro  5.8  /  Alb  3.6  /  TBili  0.2  /  DBili  x   /  AST  82  /  ALT  77  /  AlkPhos  74  [01-21-19 @ 00:17]                  imp/suggest: ESRD      Hemodialysis Prescription:  	Access:  	Dialyzer: revaclear   	Blood Flow (mL/Min): 400  	Dialysate Flow (mL/Min): 600  	Target UF (Liters):  	Treatment Time:  	Potassium:   	Calcium: 2.5  	  YOLANDA    Vitamin D     continue with hd   see hd flow sheet

## 2019-01-22 NOTE — DISCHARGE NOTE ADULT - NS AS DC FOLLOWUP STROKE INST
Smoking Cessation/Influenza vaccination (VIS Pub Date: August 7, 2015)/Heart Failure/Stroke (includes: TIA/SAH/ICH/Ischemic Stroke) Heart Failure/Smoking Cessation/Influenza vaccination (VIS Pub Date: August 7, 2015) Influenza vaccination (VIS Pub Date: August 7, 2015)/Heart Failure

## 2019-01-22 NOTE — PROGRESS NOTE ADULT - ASSESSMENT
64 yo F with ESRD (on HD M/Tu/Th/Sa), type 1 DM, CAD, HFrEF (EF 50% on TTE from 10/18), TIA, and PVD, who presented from HD with complaints of N/V and hyperglycemia. In the ED, patient found to be tachypneic (20), and hypotensive (as low as 70s/40s). Labs significant for leukocytopenia (WBC 3), hyperglycemia (269), elevated trops 201, with BHB of 6.3. s/p  500cc NS bolus. pt with recurrent dka    1- esrd  2- dka  3- pad  4- shpt           imp/suggest: ESRD      Hemodialysis Prescription:  	Access: avf  	Dialyzer: revaclear 300  	Blood Flow (mL/Min): 400  	Dialysate Flow (mL/Min): 600  	Target UF (Liters): increase fluid removal to 1.5- 2 liter  as tolerated still with increase edema LLE   	Treatment Time: 3.0 h  	Potassium: 2k   	Calcium: 2.5

## 2019-01-22 NOTE — DISCHARGE NOTE ADULT - CARE PROVIDER_API CALL
Daisy Burger (DO), Nephrology  [address]  [place] [address]  Phone: [phone]  Fax: [phone]    Pierce Viera (MD), Internal Medicine  [address] [place] [address]  Phone: [phone]  Fax: [phone]    Julián Biswas), Internal Medicine  [address] [place] [address]  Phone: [phone]  Fax: [phone]    Adenike Partida (DO), Hospitalists  [address] [place] [address]  Phone: [phone]  Fax: [phone] Daisy Burger (DO), Nephrology  [address] [facility] [address]  [place] [address]  Phone: [phone]  Fax: [phone]    Pierce Viera (MD), Internal Medicine  [address] [place] [address]  Phone: [phone]  Fax: [phone]    Julián Biswas), Internal Medicine  [address] [place] [address]  Phone: [phone]  Fax: [phone]    Braden Thompson (DO), Internal Medicine; Pulmonary Disease  [address] [facility]  [address]  [place] [address]  Phone: [phone]  Fax: [phone]

## 2019-01-22 NOTE — PROGRESS NOTE ADULT - SUBJECTIVE AND OBJECTIVE BOX
Patient is a 63y old  Female who presents with a chief complaint of DKA and Septic Shock (22 Jan 2019 16:18)    Out of MICU Asked to assume care  SUBJECTIVE / OVERNIGHT EVENTS: Comfortable without new complaints.   Review of Systems  chest pain no  palpitations no  sob no  nausea no  headache no    MEDICATIONS  (STANDING):  aspirin enteric coated 81 milliGRAM(s) Oral daily  atorvastatin 20 milliGRAM(s) Oral at bedtime  chlorhexidine 4% Liquid 1 Application(s) Topical <User Schedule>  cinacalcet 60 milliGRAM(s) Oral daily  clopidogrel Tablet 75 milliGRAM(s) Oral daily  dextrose 5%. 1000 milliLiter(s) (50 mL/Hr) IV Continuous <Continuous>  dextrose 50% Injectable 12.5 Gram(s) IV Push once  dextrose 50% Injectable 25 Gram(s) IV Push once  dextrose 50% Injectable 25 Gram(s) IV Push once  DULoxetine 60 milliGRAM(s) Oral daily  heparin  Injectable 5000 Unit(s) SubCutaneous every 8 hours  hydrALAZINE 25 milliGRAM(s) Oral every 8 hours  insulin glargine Injectable (LANTUS) 9 Unit(s) SubCutaneous <User Schedule>  insulin lispro (HumaLOG) corrective regimen sliding scale   SubCutaneous at bedtime  insulin lispro (HumaLOG) corrective regimen sliding scale   SubCutaneous three times a day before meals  insulin lispro Injectable (HumaLOG) 3 Unit(s) SubCutaneous three times a day before meals  metoprolol succinate ER 50 milliGRAM(s) Oral daily  multivitamin 1 Tablet(s) Oral daily  senna 2 Tablet(s) Oral at bedtime  sevelamer hydrochloride 800 milliGRAM(s) Oral three times a day    MEDICATIONS  (PRN):  dextrose 40% Gel 15 Gram(s) Oral once PRN Blood Glucose LESS THAN 70 milliGRAM(s)/deciliter  glucagon  Injectable 1 milliGRAM(s) IntraMuscular once PRN Glucose LESS THAN 70 milligrams/deciliter  oxyCODONE    5 mG/acetaminophen 325 mG 1 Tablet(s) Oral every 4 hours PRN Moderate Pain (4 - 6)      Vital Signs Last 24 Hrs  T(C): 37 (22 Jan 2019 19:43), Max: 37 (22 Jan 2019 19:43)  T(F): 98.6 (22 Jan 2019 19:43), Max: 98.6 (22 Jan 2019 19:43)  HR: 69 (22 Jan 2019 19:43) (69 - 80)  BP: 139/76 (22 Jan 2019 19:43) (139/76 - 165/79)  BP(mean): --  RR: 18 (22 Jan 2019 19:43) (17 - 18)  SpO2: 100% (22 Jan 2019 19:43) (93% - 100%)    PHYSICAL EXAM:  GENERAL: NAD, weak  HEAD:  Atraumatic, Normocephalic  EYES: EOMI, PERRLA, conjunctiva and sclera clear  NECK: Supple, No JVD  CHEST/LUNG: Clear to auscultation bilaterally; No wheeze  HEART: Regular rate and rhythm; No murmurs, rubs, or gallops  ABDOMEN: Soft, Nontender, Nondistended; Bowel sounds present  EXTREMITIES:  2+ Peripheral Pulses, No clubbing, cyanosis, or edema  PSYCH: AAOx3  NEUROLOGY: non-focal  SKIN: No rashes or lesions    LABS:                        9.0    4.78  )-----------( 201      ( 22 Jan 2019 13:31 )             29.0     01-22    138  |  95<L>  |  24<H>  ----------------------------<  270<H>  3.9   |  24  |  3.84<H>    Ca    7.9<L>      22 Jan 2019 11:33  Phos  5.6     01-21  Mg     2.3     01-21    TPro  5.8<L>  /  Alb  3.6  /  TBili  0.2  /  DBili  x   /  AST  82<H>  /  ALT  77<H>  /  AlkPhos  74  01-21                RADIOLOGY & ADDITIONAL TESTS:    Imaging Personally Reviewed:    Consultant(s) Notes Reviewed:      Care Discussed with Consultants/Other Providers:

## 2019-01-22 NOTE — DISCHARGE NOTE ADULT - PATIENT PORTAL LINK FT
You can access the [facility] Patient Portal, offered by [facility], by registering with the following website: [email or web address]

## 2019-01-22 NOTE — PROGRESS NOTE ADULT - SUBJECTIVE AND OBJECTIVE BOX
Cardiovascular Disease Progress Note    Overnight events: No acute events overnight.  moved out of icu. dka resolved. no cp/sob/pnd/orthopnea/palps  Otherwise review of systems negative    Objective Findings:  T(C): 36.5 (19 @ 05:52), Max: 36.8 (19 @ 16:20)  HR: 76 (19 @ 05:52) (69 - 76)  BP: 165/79 (19 @ 05:52) (145/70 - 169/71)  RR: 17 (19 @ 05:52) (12 - 26)  SpO2: 93% (19 @ 05:52) (93% - 100%)  Wt(kg): --  Daily Height in cm: 162.56 (2019 16:20)    Daily Weight in k (2019 05:51)      Physical Exam:  Gen: NAD  HEENT: EOMI  CV: RRR, normal S1 + S2, no m/r/g  Lungs: CTAB  Abd: soft, non-tender  Ext: No edema    Telemetry:    Laboratory Data:                        9.3    6.3   )-----------( 205      ( 2019 00:17 )             27.4         133<L>  |  90<L>  |  50<H>  ----------------------------<  265<H>  4.9   |  25  |  5.65<H>    Ca    7.7<L>      2019 06:25  Phos  5.6       Mg     2.3         TPro  5.8<L>  /  Alb  3.6  /  TBili  0.2  /  DBili  x   /  AST  82<H>  /  ALT  77<H>  /  AlkPhos  74                Inpatient Medications:  MEDICATIONS  (STANDING):  aspirin enteric coated 81 milliGRAM(s) Oral daily  atorvastatin 20 milliGRAM(s) Oral at bedtime  cinacalcet 60 milliGRAM(s) Oral daily  clopidogrel Tablet 75 milliGRAM(s) Oral daily  dextrose 5%. 1000 milliLiter(s) (50 mL/Hr) IV Continuous <Continuous>  dextrose 50% Injectable 12.5 Gram(s) IV Push once  dextrose 50% Injectable 25 Gram(s) IV Push once  dextrose 50% Injectable 25 Gram(s) IV Push once  DULoxetine 60 milliGRAM(s) Oral daily  heparin  Injectable 5000 Unit(s) SubCutaneous every 8 hours  insulin lispro (HumaLOG) corrective regimen sliding scale   SubCutaneous at bedtime  insulin lispro (HumaLOG) corrective regimen sliding scale   SubCutaneous three times a day before meals  insulin lispro Injectable (HumaLOG) 3 Unit(s) SubCutaneous three times a day before meals  multivitamin 1 Tablet(s) Oral daily  senna 2 Tablet(s) Oral at bedtime  sevelamer hydrochloride 800 milliGRAM(s) Oral three times a day      Assessment:  - DKA in the setting of medication non-compliance and possible sepsis  -hypotension, shock -> likely vasodilatory  -ischemic cardiomyopathy, cad s/p multiple stents  -pulmonary edema  -esrd on hd  -PAD s/p prior intervention   -remote TIA        Recs:  -management of DKA per MICU and endo --> resolved  -no true ischemic sx. ekg with nonspecific ST changes. would defer ischemic work up for now unless  -c/w asa, plavix, statin for ischemic cardiomyopathy/CAD/PAD if not contraindicated  -would resume bb and hydralazine for ischemic cardiomyopathy and HTN   -hd per renal. last dialyzed yesterday. appears mildly volume up  -dvt ppx        Over 25 minutes spent on total encounter; more than 50% of the visit was spent counseling and/or coordinating care by the attending physician.      Julián Biswas MD   Cardiovascular Disease  [phone]

## 2019-01-22 NOTE — DISCHARGE NOTE ADULT - ADDITIONAL INSTRUCTIONS
Follow-up with your primary care physician within 1 week. Call for appointment.  Please bring all discharge paperwork and list of medications to all follow up appointments  Please call for follow up appoints one day after discharge Follow up with Dr. Braden Thompson's office upon discharge at [phone] to schedule bronchoscopy/EBUS  for next week.   Follow up with endocrinologist, Dr. Ley upon discharge   Follow-up with your primary care physician within 1 week.   Please bring all discharge paperwork and list of medications to all follow up appointments  Please call for follow up appointments

## 2019-01-22 NOTE — DISCHARGE NOTE ADULT - MEDICATION SUMMARY - MEDICATIONS TO CHANGE
I will SWITCH the dose or number of times a day I take the medications listed below when I get home from the hospital:    HumaLOG KwikPen 100 units/mL injectable solution  -- 3 unit(s) injectable 3 times a day (before meals). Titrate Humalog before meals to insulin to carb ratio 1:15. Correction factor 1:50    Lantus Solostar Pen 100 units/mL subcutaneous solution  -- 8 unit(s) subcutaneous once a day (at bedtime) . Please dispense 30-day supply.  -- Do not drink alcoholic beverages when taking this medication.  It is very important that you take or use this exactly as directed.  Do not skip doses or discontinue unless directed by your doctor.  Keep in refrigerator.  Do not freeze.

## 2019-01-22 NOTE — DISCHARGE NOTE ADULT - MEDICATION SUMMARY - MEDICATIONS TO TAKE
I will START or STAY ON the medications listed below when I get home from the hospital:    Insulin Pen Needles, 4mm  -- 1 application subcutaneously 4 times a day  . ** Use with insulin pen **   -- Indication: For Diabetes mellitus of other type with ketoacidosis without coma    aspirin 81 mg oral delayed release tablet  -- 1 tab(s) by mouth once a day  -- Indication: For antiplatlet    oxyCODONE-acetaminophen 5 mg-325 mg oral tablet  -- 1 tab(s) by mouth 1 to 2 times a day, As Needed for severe pain  -- Indication: For pain    DULoxetine 60 mg oral delayed release capsule  -- 1 cap(s) by mouth once a day  -- Indication: For Depression    HumaLOG KwikPen 100 units/mL injectable solution  -- Titrate Humalog before meals to insulin to carb ratio I:C 1:15 and asked to follow correctional scale 1:50. proivide one box   -- Indication: For Diabetes mellitus of other type with ketoacidosis without coma    Lantus Solostar Pen 100 units/mL subcutaneous solution  -- 10 unit(s) subcutaneous once a day (at bedtime)  . Please dispense 90-day supply.   -- Do not drink alcoholic beverages when taking this medication.  It is very important that you take or use this exactly as directed.  Do not skip doses or discontinue unless directed by your doctor.  Keep in refrigerator.  Do not freeze.    -- Indication: For Diabetes mellitus of other type with ketoacidosis without coma    atorvastatin 20 mg oral tablet  -- 1 tab(s) by mouth once a day (at bedtime)  -- Indication: For hld    clopidogrel 75 mg oral tablet  -- 1 tab(s) by mouth once a day  -- Indication: For hld    Metoprolol Succinate ER 50 mg oral tablet, extended release  -- 1 tab(s) by mouth once a day  -- Indication: For htn    Sensipar 60 mg oral tablet  -- 1 tab(s) by mouth once a day  -- Indication: For calcimimetics    senna oral tablet  -- 2 tab(s) by mouth once a day (at bedtime), As Needed -for constipation   -- Indication: For constipation    Renvela 800 mg oral tablet  -- 3 tab(s) by mouth  (with meals)  -- Indication: For ckd    hydrALAZINE 25 mg oral tablet  -- 4 tab(s) by mouth every 8 hours  -- Indication: For hld    Multiple Vitamins oral tablet  -- 1 tab(s) by mouth once a day  -- Indication: For supplement I will START or STAY ON the medications listed below when I get home from the hospital:    Insulin Pen Needles, 4mm  -- 1 application subcutaneously 4 times a day  . ** Use with insulin pen **   -- Indication: For Diabetes mellitus of other type with ketoacidosis without coma    aspirin 81 mg oral delayed release tablet  -- 1 tab(s) by mouth once a day  -- Indication: For antiplatlet    oxyCODONE-acetaminophen 5 mg-325 mg oral tablet  -- 1 tab(s) by mouth 1 to 2 times a day, As Needed for severe pain  -- Indication: For pain    DULoxetine 60 mg oral delayed release capsule  -- 1 cap(s) by mouth once a day  -- Indication: For Depression    HumaLOG KwikPen 100 units/mL injectable solution  -- 2 unit(s) injectable 3 times a day (before meals)   -- Indication: For Diabetes mellitus of other type with ketoacidosis without coma    Lantus Solostar Pen 100 units/mL subcutaneous solution  -- 10 unit(s) subcutaneous once a day (at bedtime)  . Please dispense 90-day supply.   -- Do not drink alcoholic beverages when taking this medication.  It is very important that you take or use this exactly as directed.  Do not skip doses or discontinue unless directed by your doctor.  Keep in refrigerator.  Do not freeze.    -- Indication: For Diabetes mellitus of other type with ketoacidosis without coma    atorvastatin 20 mg oral tablet  -- 1 tab(s) by mouth once a day (at bedtime)  -- Indication: For hld    Metoprolol Succinate ER 50 mg oral tablet, extended release  -- 1 tab(s) by mouth once a day  -- Indication: For htn    Sensipar 60 mg oral tablet  -- 1 tab(s) by mouth once a day  -- Indication: For calcimimetics    senna oral tablet  -- 2 tab(s) by mouth once a day (at bedtime), As Needed -for constipation   -- Indication: For constipation    Renvela 800 mg oral tablet  -- 3 tab(s) by mouth  (with meals)  -- Indication: For ckd    hydrALAZINE 25 mg oral tablet  -- 4 tab(s) by mouth every 8 hours  -- Indication: For hld    Multiple Vitamins oral tablet  -- 1 tab(s) by mouth once a day  -- Indication: For supplement

## 2019-01-22 NOTE — DISCHARGE NOTE ADULT - SECONDARY DIAGNOSIS.
Malignant neoplasm of right lung, unspecified part of lung ESRD (end stage renal disease) Chronic systolic congestive heart failure CAD (coronary artery disease)

## 2019-01-22 NOTE — PROGRESS NOTE ADULT - SUBJECTIVE AND OBJECTIVE BOX
DIABETES FOLLOW UP NOTE: Saw pt earlier today. Pt well known to team from prior and frequent admissions    INTERVAL HX:63 year old female w/uncontrolled T1DM all known microvascular complications, CAD, CHF (EF 50% 10/2018), TIA, PVD, ESRD HD. Here with hyperglycemia /DKA due to missing insulin (pt states she ran out of insulin and called endo but didn't get Rx) Also found to have fluid overload and questionable sepsis. Having HD at time of visit. Tolerating POs with glycemic control improved for lunch. However, pt remains with fasting hyperglycemia. Snacks on and off during the day and night. Can't recall if she had snack last night. No hypoglycemia. States feeling much better      Review of Systems:  Cardiovascular: No chest pain, palpitations  Respiratory: No SOB, no cough  GI: No nausea, vomiting, abdominal pain  Endocrine: no polyuria, polydipsia or S&Sx of hypoglycemia    Allergies    No Known Allergies    Intolerances      MEDICATIONS:  atorvastatin 20 milliGRAM(s) Oral at bedtime  cinacalcet 60 milliGRAM(s) Oral daily  insulin glargine Injectable (LANTUS) 9 Unit(s) SubCutaneous <User Schedule>  insulin lispro (HumaLOG) corrective regimen sliding scale   SubCutaneous at bedtime  insulin lispro (HumaLOG) corrective regimen sliding scale   SubCutaneous three times a day before meals  insulin lispro Injectable (HumaLOG) 3 Unit(s) SubCutaneous three times a day before meals    PHYSICAL EXAM:  VITALS: T(C): 36.6 (01-22-19 @ 13:00)  T(F): 97.8 (01-22-19 @ 13:00), Max: 98.2 (01-21-19 @ 16:20)  HR: 80 (01-22-19 @ 13:00) (69 - 80)  BP: 149/77 (01-22-19 @ 13:00) (145/70 - 165/79)  RR:  (16 - 18)  SpO2:  (93% - 100%)  Wt(kg): --  GENERAL: Female laying in bed  in NAD. Having HD  Abdomen: Soft, nontender, non distended  Extremities: Warm, minimal edema in LEs  NEURO: A&O X3    LABS:  POCT Blood Glucose.: 119 mg/dL (01-22-19 @ 13:41)  POCT Blood Glucose.: 303 mg/dL (01-22-19 @ 08:00)  POCT Blood Glucose.: 201 mg/dL (01-21-19 @ 21:42)  POCT Blood Glucose.: 179 mg/dL (01-21-19 @ 16:39)  POCT Blood Glucose.: 147 mg/dL (01-21-19 @ 13:27)  POCT Blood Glucose.: 293 mg/dL (01-21-19 @ 10:27)  POCT Blood Glucose.: 342 mg/dL (01-21-19 @ 08:59)  POCT Blood Glucose.: 240 mg/dL (01-20-19 @ 21:03)  POCT Blood Glucose.: 198 mg/dL (01-20-19 @ 17:48)  POCT Blood Glucose.: 232 mg/dL (01-20-19 @ 14:42)  POCT Blood Glucose.: 139 mg/dL (01-20-19 @ 12:04)  POCT Blood Glucose.: 140 mg/dL (01-20-19 @ 11:01)  POCT Blood Glucose.: 129 mg/dL (01-20-19 @ 09:06)  POCT Blood Glucose.: 117 mg/dL (01-20-19 @ 08:02)  POCT Blood Glucose.: 123 mg/dL (01-20-19 @ 07:05)  POCT Blood Glucose.: 114 mg/dL (01-20-19 @ 06:02)  POCT Blood Glucose.: 119 mg/dL (01-20-19 @ 04:56)  POCT Blood Glucose.: 107 mg/dL (01-20-19 @ 03:59)  POCT Blood Glucose.: 130 mg/dL (01-20-19 @ 03:02)  POCT Blood Glucose.: 130 mg/dL (01-20-19 @ 02:02)                            9.0    4.78  )-----------( 201      ( 22 Jan 2019 13:31 )             29.0       01-22    138  |  95<L>  |  24<H>  ----------------------------<  270<H>  3.9   |  24  |  3.84<H>    EGFR if : 14<L>  EGFR if non : 12<L>    Ca    7.9<L>      01-22  Mg     2.3     01-21  Phos  5.6     01-21    TPro  5.8<L>  /  Alb  3.6  /  TBili  0.2  /  DBili  x   /  AST  82<H>  /  ALT  77<H>  /  AlkPhos  74  01-21      Hemoglobin A1C, Whole Blood: 8.6 % <H> [4.0 - 5.6] (11-16-18 @ 20:14)

## 2019-01-22 NOTE — DISCHARGE NOTE ADULT - PLAN OF CARE
HgA1C this admission. 9.1  Make sure you get your HgA1c checked every three months.  If you take oral diabetes medications, check your blood glucose two times a day.  If you take insulin, check your blood glucose before meals and at bedtime.  It's important not to skip any meals.  Keep a log of your blood glucose results and always take it with you to your doctor appointments.  Keep a list of your current medications including injectables and over the counter medications and bring this medication list with you to all your doctor appointments.  If you have not seen your opthalmologist this year call for appointment.  Check your feet daily for redness, sores, or openings. Do not self treat. If no improvement in two days call your primary care physician for an appointment.  Low blood sugar (hypoglycemia) is a blood sugar below 70mg/dl. Check your blood sugar if you feel signs/symptoms of hypoglycemia. If your blood sugar is below 70 take 15 grams of carbohydrates (ex 4 oz of apple juice, 3-4 glucosr tablets, or 4-6 oz of regular soda) wait 15 minutes and repeat blood sugar to make sure it comes up above 70.  If your blood sugar is above 70 and you are due for a meal, have a meal.  If you are not due for a meal have a snack.  This snack helps keeps your blood sugar at a safe range. Patient will remain hemodynamically stable Please discontinue Plavix  Remain off Plavix for the next 5-7 days  Follow up with pulmonologist Dr. Thompson schedule bronchoscopy/EBUS  for next week. Continue current dialysis schedule  Follow up with Nephrologist   Avoid taking (NSAIDs) - (ex: Ibuprofen, Advil, Celebrex, Naprosyn)  Avoid taking any nephrotoxic agents (can harm kidneys) - Intravenous contrast for diagnostic testing, combination cold medications.  Have all medications adjusted for your renal function by your Health Care Provider.  Blood pressure control is important.  Take all medication as prescribed. Weigh yourself daily.  If you gain 3lbs in 3 days, or 5lbs in a week call your Health Care Provider.  Do not eat or drink foods containing more than 2000mg of salt (sodium) in your diet every day.  Call your Health Care Provider if you have any swelling or increased swelling in your feet, ankles, and/or stomach.  Take all of your medication as directed.  If you become dizzy call your Health Care Provider. Coronary artery disease is a condition where the arteries the supply the heart muscle get clogges with fatty deposits & puts you at risk for a heart attack  Call your doctor if you have any new pain, pressure, or discomfort in the center of your chest, pain, tingling or discomfort in arms, back, neck, jaw, or stomach, shortness of breath, nausea, vomiting, burping or heartburn, sweating, cold and clammy skin, racing or abnormal heartbeat for more than 10 minutes or if they keep coming & going.  Call 911 and do not tr to get to hospital by care  You can help yourself with lefestyle changes (quitting smoking if you smoke), eat lots of fruits & vegetables & low fat dairy products, not a lot of meat & fatty foods, walk or some form of physical activity most days of the week, lose weight if you are overweight  Take your cardiac medication as prescribed to lower cholesterol, to lower blood pressure, aspirin to prevent blood clots, and diabetes control  Make sure to keep appointments with doctor for cardiac follow up care

## 2019-01-22 NOTE — PROGRESS NOTE ADULT - PROBLEM SELECTOR PLAN 1
-Test BG ac and hs and 2 am every day  -Increase Lantus dose to 9 units q hs  -C/w Humalog 3 units ac meals. MAKE SURE PT EATS!!  -Change Humalog correction scale ac meals to low dose.  -C/w Humalog low dose correction scale at hs  -Please write Rxs for: Solo Star Insulin pen (1 box)/Humalog Kwik insulin pen(1 box)/Fany insulin pen needles(1 box).   -pt to f/u with pvt endocrinologist.  -Consider home care visit to make sure pt has all DM supplies at home and make sure she is taking insulin as ordered.   -Plan discussed with pt/team.  Contact info: [phone] (24/7). pager [phone] -Test BG ac and hs and 2 am every day  -Increase Lantus dose to 9 units at 6pm since pt received Lantus dose late yesterday.   -C/w Humalog 3 units ac meals. MAKE SURE PT EATS!!  -Change Humalog correction scale ac meals to low dose.  -C/w Humalog low dose correction scale at hs  -Please write Rxs for: Solo Star Insulin pen (1 box)/Humalog Kwik insulin pen(1 box)/Fany insulin pen needles(1 box).   -pt to f/u with pvt endocrinologist.  -Consider home care visit to make sure pt has all DM supplies at home and make sure she is taking insulin as ordered.   -Plan discussed with pt/team.  Contact info: [phone] (24/7). pager [phone]

## 2019-01-22 NOTE — PROGRESS NOTE ADULT - ASSESSMENT
63 f with  DKA- resolving. Endocrine follow re insulin pump.  ESRD- continue HD. Nephrology follow.  Observe off antibiotics  CAD stable.  Anxiety control  d/w   Pierce Viera MD pager [phone]

## 2019-01-22 NOTE — DISCHARGE NOTE ADULT - CARE PLAN
Principal Discharge DX:	Diabetic ketoacidosis with coma associated with type 1 diabetes mellitus  Goal:	Patient will remain hemodynamically stable  Assessment and plan of treatment:	HgA1C this admission. 9.1  Make sure you get your HgA1c checked every three months.  If you take oral diabetes medications, check your blood glucose two times a day.  If you take insulin, check your blood glucose before meals and at bedtime.  It's important not to skip any meals.  Keep a log of your blood glucose results and always take it with you to your doctor appointments.  Keep a list of your current medications including injectables and over the counter medications and bring this medication list with you to all your doctor appointments.  If you have not seen your opthalmologist this year call for appointment.  Check your feet daily for redness, sores, or openings. Do not self treat. If no improvement in two days call your primary care physician for an appointment.  Low blood sugar (hypoglycemia) is a blood sugar below 70mg/dl. Check your blood sugar if you feel signs/symptoms of hypoglycemia. If your blood sugar is below 70 take 15 grams of carbohydrates (ex 4 oz of apple juice, 3-4 glucosr tablets, or 4-6 oz of regular soda) wait 15 minutes and repeat blood sugar to make sure it comes up above 70.  If your blood sugar is above 70 and you are due for a meal, have a meal.  If you are not due for a meal have a snack.  This snack helps keeps your blood sugar at a safe range. Principal Discharge DX:	Diabetic ketoacidosis with coma associated with type 1 diabetes mellitus  Goal:	Patient will remain hemodynamically stable  Assessment and plan of treatment:	HgA1C this admission. 9.1  Make sure you get your HgA1c checked every three months.  If you take oral diabetes medications, check your blood glucose two times a day.  If you take insulin, check your blood glucose before meals and at bedtime.  It's important not to skip any meals.  Keep a log of your blood glucose results and always take it with you to your doctor appointments.  Keep a list of your current medications including injectables and over the counter medications and bring this medication list with you to all your doctor appointments.  If you have not seen your opthalmologist this year call for appointment.  Check your feet daily for redness, sores, or openings. Do not self treat. If no improvement in two days call your primary care physician for an appointment.  Low blood sugar (hypoglycemia) is a blood sugar below 70mg/dl. Check your blood sugar if you feel signs/symptoms of hypoglycemia. If your blood sugar is below 70 take 15 grams of carbohydrates (ex 4 oz of apple juice, 3-4 glucosr tablets, or 4-6 oz of regular soda) wait 15 minutes and repeat blood sugar to make sure it comes up above 70.  If your blood sugar is above 70 and you are due for a meal, have a meal.  If you are not due for a meal have a snack.  This snack helps keeps your blood sugar at a safe range.  Secondary Diagnosis:	Malignant neoplasm of right lung, unspecified part of lung  Assessment and plan of treatment:	Please discontinue Plavix  Remain off Plavix for the next 5-7 days  Follow up with pulmonologist Dr. Thompson schedule bronchoscopy/EBUS  for next week.  Secondary Diagnosis:	ESRD (end stage renal disease)  Assessment and plan of treatment:	Continue current dialysis schedule  Follow up with Nephrologist   Avoid taking (NSAIDs) - (ex: Ibuprofen, Advil, Celebrex, Naprosyn)  Avoid taking any nephrotoxic agents (can harm kidneys) - Intravenous contrast for diagnostic testing, combination cold medications.  Have all medications adjusted for your renal function by your Health Care Provider.  Blood pressure control is important.  Take all medication as prescribed.  Secondary Diagnosis:	Chronic systolic congestive heart failure  Assessment and plan of treatment:	Weigh yourself daily.  If you gain 3lbs in 3 days, or 5lbs in a week call your Health Care Provider.  Do not eat or drink foods containing more than 2000mg of salt (sodium) in your diet every day.  Call your Health Care Provider if you have any swelling or increased swelling in your feet, ankles, and/or stomach.  Take all of your medication as directed.  If you become dizzy call your Health Care Provider.  Secondary Diagnosis:	CAD (coronary artery disease)  Assessment and plan of treatment:	Coronary artery disease is a condition where the arteries the supply the heart muscle get clogges with fatty deposits & puts you at risk for a heart attack  Call your doctor if you have any new pain, pressure, or discomfort in the center of your chest, pain, tingling or discomfort in arms, back, neck, jaw, or stomach, shortness of breath, nausea, vomiting, burping or heartburn, sweating, cold and clammy skin, racing or abnormal heartbeat for more than 10 minutes or if they keep coming & going.  Call 911 and do not tr to get to hospital by care  You can help yourself with lefestyle changes (quitting smoking if you smoke), eat lots of fruits & vegetables & low fat dairy products, not a lot of meat & fatty foods, walk or some form of physical activity most days of the week, lose weight if you are overweight  Take your cardiac medication as prescribed to lower cholesterol, to lower blood pressure, aspirin to prevent blood clots, and diabetes control  Make sure to keep appointments with doctor for cardiac follow up care

## 2019-01-23 LAB
ANION GAP SERPL CALC-SCNC: 14 MMOL/L — SIGNIFICANT CHANGE UP (ref 5–17)
BUN SERPL-MCNC: 12 MG/DL — SIGNIFICANT CHANGE UP (ref 7–23)
CALCIUM SERPL-MCNC: 7.8 MG/DL — LOW (ref 8.4–10.5)
CHLORIDE SERPL-SCNC: 94 MMOL/L — LOW (ref 96–108)
CO2 SERPL-SCNC: 27 MMOL/L — SIGNIFICANT CHANGE UP (ref 22–31)
CREAT SERPL-MCNC: 3.29 MG/DL — HIGH (ref 0.5–1.3)
GLUCOSE BLDC GLUCOMTR-MCNC: 169 MG/DL — HIGH (ref 70–99)
GLUCOSE BLDC GLUCOMTR-MCNC: 175 MG/DL — HIGH (ref 70–99)
GLUCOSE BLDC GLUCOMTR-MCNC: 194 MG/DL — HIGH (ref 70–99)
GLUCOSE BLDC GLUCOMTR-MCNC: 208 MG/DL — HIGH (ref 70–99)
GLUCOSE SERPL-MCNC: 125 MG/DL — HIGH (ref 70–99)
HCT VFR BLD CALC: 30.9 % — LOW (ref 34.5–45)
HGB BLD-MCNC: 9.7 G/DL — LOW (ref 11.5–15.5)
MCHC RBC-ENTMCNC: 31.4 GM/DL — LOW (ref 32–36)
MCHC RBC-ENTMCNC: 33 PG — SIGNIFICANT CHANGE UP (ref 27–34)
MCV RBC AUTO: 105.1 FL — HIGH (ref 80–100)
PLATELET # BLD AUTO: 206 K/UL — SIGNIFICANT CHANGE UP (ref 150–400)
POTASSIUM SERPL-MCNC: 4.1 MMOL/L — SIGNIFICANT CHANGE UP (ref 3.5–5.3)
POTASSIUM SERPL-SCNC: 4.1 MMOL/L — SIGNIFICANT CHANGE UP (ref 3.5–5.3)
RBC # BLD: 2.94 M/UL — LOW (ref 3.8–5.2)
RBC # FLD: 14.8 % — HIGH (ref 10.3–14.5)
SODIUM SERPL-SCNC: 135 MMOL/L — SIGNIFICANT CHANGE UP (ref 135–145)
WBC # BLD: 4.81 K/UL — SIGNIFICANT CHANGE UP (ref 3.8–10.5)
WBC # FLD AUTO: 4.81 K/UL — SIGNIFICANT CHANGE UP (ref 3.8–10.5)

## 2019-01-23 PROCEDURE — 99232 SBSQ HOSP IP/OBS MODERATE 35: CPT

## 2019-01-23 PROCEDURE — 71250 CT THORAX DX C-: CPT | Mod: 26

## 2019-01-23 RX ORDER — INSULIN GLARGINE 100 [IU]/ML
10 INJECTION, SOLUTION SUBCUTANEOUS AT BEDTIME
Qty: 0 | Refills: 0 | Status: DISCONTINUED | OUTPATIENT
Start: 2019-01-23 | End: 2019-01-24

## 2019-01-23 RX ORDER — LISINOPRIL 2.5 MG/1
1 TABLET ORAL
Qty: 0 | Refills: 0 | COMMUNITY

## 2019-01-23 RX ORDER — ENOXAPARIN SODIUM 100 MG/ML
10 INJECTION SUBCUTANEOUS
Qty: 1 | Refills: 0 | OUTPATIENT
Start: 2019-01-23

## 2019-01-23 RX ORDER — INSULIN LISPRO 100/ML
3 VIAL (ML) SUBCUTANEOUS
Qty: 1 | Refills: 0 | OUTPATIENT
Start: 2019-01-23

## 2019-01-23 RX ADMIN — ATORVASTATIN CALCIUM 20 MILLIGRAM(S): 80 TABLET, FILM COATED ORAL at 21:23

## 2019-01-23 RX ADMIN — Medication 25 MILLIGRAM(S): at 05:08

## 2019-01-23 RX ADMIN — Medication 25 MILLIGRAM(S): at 21:23

## 2019-01-23 RX ADMIN — SEVELAMER CARBONATE 800 MILLIGRAM(S): 2400 POWDER, FOR SUSPENSION ORAL at 17:06

## 2019-01-23 RX ADMIN — Medication 3 UNIT(S): at 08:26

## 2019-01-23 RX ADMIN — OXYCODONE AND ACETAMINOPHEN 1 TABLET(S): 5; 325 TABLET ORAL at 20:50

## 2019-01-23 RX ADMIN — CLOPIDOGREL BISULFATE 75 MILLIGRAM(S): 75 TABLET, FILM COATED ORAL at 11:26

## 2019-01-23 RX ADMIN — Medication 25 MILLIGRAM(S): at 14:36

## 2019-01-23 RX ADMIN — CINACALCET 60 MILLIGRAM(S): 30 TABLET, FILM COATED ORAL at 11:26

## 2019-01-23 RX ADMIN — Medication 50 MILLIGRAM(S): at 05:08

## 2019-01-23 RX ADMIN — SEVELAMER CARBONATE 800 MILLIGRAM(S): 2400 POWDER, FOR SUSPENSION ORAL at 12:22

## 2019-01-23 RX ADMIN — OXYCODONE AND ACETAMINOPHEN 1 TABLET(S): 5; 325 TABLET ORAL at 21:18

## 2019-01-23 RX ADMIN — Medication 3 UNIT(S): at 17:05

## 2019-01-23 RX ADMIN — CHLORHEXIDINE GLUCONATE 1 APPLICATION(S): 213 SOLUTION TOPICAL at 11:27

## 2019-01-23 RX ADMIN — Medication 2: at 17:05

## 2019-01-23 RX ADMIN — Medication 1: at 12:22

## 2019-01-23 RX ADMIN — Medication 81 MILLIGRAM(S): at 11:26

## 2019-01-23 RX ADMIN — INSULIN GLARGINE 10 UNIT(S): 100 INJECTION, SOLUTION SUBCUTANEOUS at 22:04

## 2019-01-23 RX ADMIN — Medication 3 UNIT(S): at 12:22

## 2019-01-23 RX ADMIN — Medication 1 TABLET(S): at 11:26

## 2019-01-23 RX ADMIN — DULOXETINE HYDROCHLORIDE 60 MILLIGRAM(S): 30 CAPSULE, DELAYED RELEASE ORAL at 11:26

## 2019-01-23 RX ADMIN — SEVELAMER CARBONATE 800 MILLIGRAM(S): 2400 POWDER, FOR SUSPENSION ORAL at 05:08

## 2019-01-23 RX ADMIN — Medication 1: at 08:25

## 2019-01-23 NOTE — PROGRESS NOTE ADULT - PROBLEM SELECTOR PLAN 1
-Test BG ac and hs and 2 am every day  -Increase Lantus dose to 10 units at hs  -C/w Humalog 3 units ac meals. MAKE SURE PT EATS!!  -Change Humalog correction scale ac meals to low dose.  -C/w Humalog low dose correction scale at hs  -Please write Rxs for: Solo Star Insulin pen (1 box)/Humalog Kwik insulin pen(1 box)/Fany insulin pen needles(1 box).   -pt to f/u with pvt endocrinologist.  -Consider home care visit to make sure pt has all DM supplies at home and make sure she is taking insulin as ordered.   -Plan discussed with pt/team.  Contact info: [phone] (24/7). pager [phone]

## 2019-01-23 NOTE — PROGRESS NOTE ADULT - ASSESSMENT
63 year old female w/uncontrolled T1DM multiple complications and comorbidities. Here with hyperglycemia /DKA due to running out of  insulin. Also found to have fluid overload and questionable sepsis. Tolerating POs with glycemic control variable depending on PO intake. However, pt remains with fasting hyperglycemia. No hypoglycemia. States feeling much better. Will increase basal insulin slowly to improve fasting BG levels.

## 2019-01-23 NOTE — PROGRESS NOTE ADULT - SUBJECTIVE AND OBJECTIVE BOX
Patient is a 63y old  Female who presents with a chief complaint of DKA and Septic Shock (23 Jan 2019 17:21)      SUBJECTIVE / OVERNIGHT EVENTS: Comfortable without new complaints.   Review of Systems  chest pain no  palpitations no  sob no  nausea no  headache no    MEDICATIONS  (STANDING):  aspirin enteric coated 81 milliGRAM(s) Oral daily  atorvastatin 20 milliGRAM(s) Oral at bedtime  chlorhexidine 4% Liquid 1 Application(s) Topical <User Schedule>  cinacalcet 60 milliGRAM(s) Oral daily  clopidogrel Tablet 75 milliGRAM(s) Oral daily  dextrose 5%. 1000 milliLiter(s) (50 mL/Hr) IV Continuous <Continuous>  dextrose 50% Injectable 12.5 Gram(s) IV Push once  dextrose 50% Injectable 25 Gram(s) IV Push once  dextrose 50% Injectable 25 Gram(s) IV Push once  DULoxetine 60 milliGRAM(s) Oral daily  heparin  Injectable 5000 Unit(s) SubCutaneous every 8 hours  hydrALAZINE 25 milliGRAM(s) Oral every 8 hours  insulin glargine Injectable (LANTUS) 10 Unit(s) SubCutaneous at bedtime  insulin lispro (HumaLOG) corrective regimen sliding scale   SubCutaneous at bedtime  insulin lispro (HumaLOG) corrective regimen sliding scale   SubCutaneous three times a day before meals  insulin lispro Injectable (HumaLOG) 3 Unit(s) SubCutaneous three times a day before meals  metoprolol succinate ER 50 milliGRAM(s) Oral daily  multivitamin 1 Tablet(s) Oral daily  senna 2 Tablet(s) Oral at bedtime  sevelamer hydrochloride 800 milliGRAM(s) Oral three times a day    MEDICATIONS  (PRN):  dextrose 40% Gel 15 Gram(s) Oral once PRN Blood Glucose LESS THAN 70 milliGRAM(s)/deciliter  glucagon  Injectable 1 milliGRAM(s) IntraMuscular once PRN Glucose LESS THAN 70 milligrams/deciliter  oxyCODONE    5 mG/acetaminophen 325 mG 1 Tablet(s) Oral every 4 hours PRN Moderate Pain (4 - 6)      Vital Signs Last 24 Hrs  T(C): 36.6 (23 Jan 2019 19:57), Max: 36.7 (23 Jan 2019 04:46)  T(F): 97.8 (23 Jan 2019 19:57), Max: 98.1 (23 Jan 2019 04:46)  HR: 70 (23 Jan 2019 19:57) (70 - 80)  BP: 144/76 (23 Jan 2019 19:57) (137/73 - 153/76)  BP(mean): --  RR: 18 (23 Jan 2019 19:57) (18 - 18)  SpO2: 95% (23 Jan 2019 19:57) (92% - 100%)    PHYSICAL EXAM:  GENERAL: NAD  HEAD:  Atraumatic, Normocephalic  EYES: EOMI, PERRLA, conjunctiva and sclera clear  NECK: Supple, No JVD  CHEST/LUNG: Clear to auscultation bilaterally; No wheeze  HEART: Regular rate and rhythm; No murmurs, rubs, or gallops  ABDOMEN: Soft, Nontender, Nondistended; Bowel sounds present  EXTREMITIES:  2+ Peripheral Pulses, No clubbing, cyanosis, or edema  PSYCH: Anxious  NEUROLOGY: non-focal  SKIN: No rashes or lesions    LABS:                        9.7    4.81  )-----------( 206      ( 23 Jan 2019 09:08 )             30.9     01-23    135  |  94<L>  |  12  ----------------------------<  125<H>  4.1   |  27  |  3.29<H>    Ca    7.8<L>      23 Jan 2019 07:15                  RADIOLOGY & ADDITIONAL TESTS:    Imaging Personally Reviewed:  < from: CT Chest No Cont (01.23.19 @ 16:02) >    IMPRESSION:    New unilateral interlobular septal thickening on the right with right   pleural effusion.Findings are concerning for lymphangitic spread of   malignancy. An apparent right hilar/suprahilar mass is identified   measuring 2.3 x 2.8 cm. However this is unclear on this noncontrast   examination. Consider repeat chest CT with IV contrast for further   evaluation.    Bilateral upper lobe groundglass opacities are 1.1 cm each and are   unchanged.      < end of copied text >    Consultant(s) Notes Reviewed:      Care Discussed with Consultants/Other Providers:

## 2019-01-23 NOTE — PROGRESS NOTE ADULT - ASSESSMENT
63 f with  DKA- resolving. Endocrine follow re insulin pump.  ESRD- continue HD. Nephrology follow.  Observe off antibiotics  CAD stable.  Abnormal CT lung- Pulmonary evaluation Dr. Tate/ Dr. Russell  Anxiety control  Pierce Viera MD pager [phone]

## 2019-01-23 NOTE — PROGRESS NOTE ADULT - SUBJECTIVE AND OBJECTIVE BOX
Cardiovascular Disease Progress Note    Overnight events: No acute events overnight.  feels well. still on supplemental o2. denies any new cardiac sx but reports sob when her o2 is removed  Otherwise review of systems negative    Objective Findings:  T(C): 36.7 (19 @ 04:46), Max: 37 (19 @ 19:43)  HR: 72 (19 @ 04:46) (69 - 80)  BP: 137/73 (19 @ 04:46) (137/73 - 156/75)  RR: 18 (19 @ 04:46) (18 - 18)  SpO2: 100% (19 @ 04:46) (97% - 100%)  Wt(kg): --  Daily     Daily Weight in k (2019 06:12)      Physical Exam:  Gen: NAD  HEENT: EOMI  CV: RRR, normal S1 + S2, no m/r/g  Lungs: CTAB  Abd: soft, non-tender  Ext: No edema      Laboratory Data:                        9.0    4.78  )-----------( 201      ( 2019 13:31 )             29.0         135  |  94<L>  |  12  ----------------------------<  125<H>  4.1   |  27  |  3.29<H>    Ca    7.8<L>      2019 07:15                Inpatient Medications:  MEDICATIONS  (STANDING):  aspirin enteric coated 81 milliGRAM(s) Oral daily  atorvastatin 20 milliGRAM(s) Oral at bedtime  chlorhexidine 4% Liquid 1 Application(s) Topical <User Schedule>  cinacalcet 60 milliGRAM(s) Oral daily  clopidogrel Tablet 75 milliGRAM(s) Oral daily  dextrose 5%. 1000 milliLiter(s) (50 mL/Hr) IV Continuous <Continuous>  dextrose 50% Injectable 12.5 Gram(s) IV Push once  dextrose 50% Injectable 25 Gram(s) IV Push once  dextrose 50% Injectable 25 Gram(s) IV Push once  DULoxetine 60 milliGRAM(s) Oral daily  heparin  Injectable 5000 Unit(s) SubCutaneous every 8 hours  hydrALAZINE 25 milliGRAM(s) Oral every 8 hours  insulin glargine Injectable (LANTUS) 9 Unit(s) SubCutaneous <User Schedule>  insulin lispro (HumaLOG) corrective regimen sliding scale   SubCutaneous at bedtime  insulin lispro (HumaLOG) corrective regimen sliding scale   SubCutaneous three times a day before meals  insulin lispro Injectable (HumaLOG) 3 Unit(s) SubCutaneous three times a day before meals  metoprolol succinate ER 50 milliGRAM(s) Oral daily  multivitamin 1 Tablet(s) Oral daily  senna 2 Tablet(s) Oral at bedtime  sevelamer hydrochloride 800 milliGRAM(s) Oral three times a day      Assessment:  - DKA in the setting of medication non-compliance and possible sepsis  -hypotension, shock -> likely vasodilatory  -ischemic cardiomyopathy, cad s/p multiple stents  -pulmonary edema  -esrd on hd  -PAD s/p prior intervention   -remote TIA        Recs:  -DKA resolved. f/u endo recs  -no true ischemic sx. ekg with nonspecific ST changes. would defer ischemic work up for now unless  -reports persistent sob. would repeat CT chest given abnormal findings on 10/31/18  -c/w asa, plavix, statin for ischemic cardiomyopathy/CAD/PAD if not contraindicated  -would resume bb and hydralazine for ischemic cardiomyopathy and HTN   -hd per renal. appears relatively euvolemic  -dvt ppx. refusing hsq        Over 25 minutes spent on total encounter; more than 50% of the visit was spent counseling and/or coordinating care by the attending physician.      Julián Biswas MD   Cardiovascular Disease  [phone]

## 2019-01-23 NOTE — PROGRESS NOTE ADULT - SUBJECTIVE AND OBJECTIVE BOX
DIABETES FOLLOW UP NOTE: Saw pt earlier today. Pt well known to team from prior and frequent admissions  INTERVAL HX:63 year old female w/uncontrolled T1DM all known microvascular complications, CAD, CHF (EF 50% 10/2018), TIA, PVD, ESRD HD. Here with hyperglycemia /DKA due to missing insulin (pt states she ran out of insulin and called endo but didn't get Rx) Also found to have fluid overload and questionable sepsis. Feeling better today and tolerating POs with glycemic control mostly at goal with on and off hyperglycemia depending on PO intake. Remains with fasting hyperglycemia by POC. No hypoglycemia. States feeling much better and per team possible discharge in next 24 hours. However, had ct scan of chest positive for lung mass.      Review of Systems:  General: No complaints   Cardiovascular: No chest pain, palpitations  Respiratory: No SOB, no cough  GI: No nausea, vomiting, abdominal pain  Endocrine: No polyuria, polydipsia or S&Sx of hypoglycemia    Allergies    No Known Allergies    Intolerances      MEDICATIONS:  atorvastatin 20 milliGRAM(s) Oral at bedtime  cinacalcet 60 milliGRAM(s) Oral daily  insulin glargine Injectable (LANTUS) 9 Unit(s) SubCutaneous <User Schedule>  insulin lispro (HumaLOG) corrective regimen sliding scale   SubCutaneous at bedtime  insulin lispro (HumaLOG) corrective regimen sliding scale   SubCutaneous three times a day before meals  insulin lispro Injectable (HumaLOG) 3 Unit(s) SubCutaneous three times a day before meals    PHYSICAL EXAM:  Vital Signs Last 24 Hrs  T(C): 36.6 (01-23-19 @ 14:34), Max: 37 (01-22-19 @ 19:43)  T(F): 97.9 (01-23-19 @ 14:34), Max: 98.6 (01-22-19 @ 19:43)  HR: 80 (01-23-19 @ 14:34) (69 - 80)  BP: 153/76 (01-23-19 @ 14:34) (137/73 - 153/76)  BP(mean): --  RR: 18 (01-23-19 @ 14:34) (18 - 18)  SpO2: 92% (01-23-19 @ 14:34) (92% - 100%)  GENERAL: Female laying in bed  in NAD.   Abdomen: Soft, nontender, non distended  Extremities: Warm, minimal edema in LEs  NEURO: A&O X3    LABS:  POCT Blood Glucose.: 208 mg/dL (01-23-19 @ 16:46)  POCT Blood Glucose.: 169 mg/dL (01-23-19 @ 11:47)  POCT Blood Glucose.: 194 mg/dL (01-23-19 @ 08:15)  POCT Blood Glucose.: 91 mg/dL (01-22-19 @ 22:00)  POCT Blood Glucose.: 169 mg/dL (01-22-19 @ 17:56)  POCT Blood Glucose.: 132 mg/dL (01-22-19 @ 16:45)  POCT Blood Glucose.: 119 mg/dL (01-22-19 @ 13:41)  POCT Blood Glucose.: 303 mg/dL (01-22-19 @ 08:00)  POCT Blood Glucose.: 201 mg/dL (01-21-19 @ 21:42)                          9.7    4.81  )-----------( 206      ( 23 Jan 2019 09:08 )             30.9     01-23    135  |  94<L>  |  12  ----------------------------<  125<H>  4.1   |  27  |  3.29<H>    Ca    7.8<L>      23 Jan 2019 07:15      Ca    7.9<L>      01-22  Mg     2.3     01-21  Phos  5.6     01-21    TPro  5.8<L>  /  Alb  3.6  /  TBili  0.2  /  DBili  x   /  AST  82<H>  /  ALT  77<H>  /  AlkPhos  74  01-21      Hemoglobin A1C, Whole Blood: 8.6 % <H> [4.0 - 5.6] (11-16-18 @ 20:14)

## 2019-01-24 DIAGNOSIS — I50.22 CHRONIC SYSTOLIC (CONGESTIVE) HEART FAILURE: ICD-10-CM

## 2019-01-24 DIAGNOSIS — N18.6 END STAGE RENAL DISEASE: ICD-10-CM

## 2019-01-24 DIAGNOSIS — I25.10 ATHEROSCLEROTIC HEART DISEASE OF NATIVE CORONARY ARTERY WITHOUT ANGINA PECTORIS: ICD-10-CM

## 2019-01-24 DIAGNOSIS — R06.02 SHORTNESS OF BREATH: ICD-10-CM

## 2019-01-24 DIAGNOSIS — E10.10 TYPE 1 DIABETES MELLITUS WITH KETOACIDOSIS WITHOUT COMA: ICD-10-CM

## 2019-01-24 DIAGNOSIS — E10.22 TYPE 1 DIABETES MELLITUS WITH DIABETIC CHRONIC KIDNEY DISEASE: ICD-10-CM

## 2019-01-24 DIAGNOSIS — C34.91 MALIGNANT NEOPLASM OF UNSPECIFIED PART OF RIGHT BRONCHUS OR LUNG: ICD-10-CM

## 2019-01-24 DIAGNOSIS — Z29.9 ENCOUNTER FOR PROPHYLACTIC MEASURES, UNSPECIFIED: ICD-10-CM

## 2019-01-24 LAB
CULTURE RESULTS: SIGNIFICANT CHANGE UP
CULTURE RESULTS: SIGNIFICANT CHANGE UP
GLUCOSE BLDC GLUCOMTR-MCNC: 118 MG/DL — HIGH (ref 70–99)
GLUCOSE BLDC GLUCOMTR-MCNC: 137 MG/DL — HIGH (ref 70–99)
GLUCOSE BLDC GLUCOMTR-MCNC: 72 MG/DL — SIGNIFICANT CHANGE UP (ref 70–99)
GLUCOSE BLDC GLUCOMTR-MCNC: 78 MG/DL — SIGNIFICANT CHANGE UP (ref 70–99)
SPECIMEN SOURCE: SIGNIFICANT CHANGE UP
SPECIMEN SOURCE: SIGNIFICANT CHANGE UP

## 2019-01-24 PROCEDURE — 99232 SBSQ HOSP IP/OBS MODERATE 35: CPT

## 2019-01-24 PROCEDURE — 99223 1ST HOSP IP/OBS HIGH 75: CPT

## 2019-01-24 RX ORDER — INSULIN GLARGINE 100 [IU]/ML
8 INJECTION, SOLUTION SUBCUTANEOUS AT BEDTIME
Qty: 0 | Refills: 0 | Status: DISCONTINUED | OUTPATIENT
Start: 2019-01-24 | End: 2019-01-25

## 2019-01-24 RX ORDER — ERYTHROPOIETIN 10000 [IU]/ML
10000 INJECTION, SOLUTION INTRAVENOUS; SUBCUTANEOUS ONCE
Qty: 0 | Refills: 0 | Status: COMPLETED | OUTPATIENT
Start: 2019-01-24 | End: 2019-01-24

## 2019-01-24 RX ORDER — INSULIN LISPRO 100/ML
2 VIAL (ML) SUBCUTANEOUS
Qty: 0 | Refills: 0 | Status: DISCONTINUED | OUTPATIENT
Start: 2019-01-24 | End: 2019-01-25

## 2019-01-24 RX ADMIN — Medication 50 MILLIGRAM(S): at 05:40

## 2019-01-24 RX ADMIN — Medication 3 UNIT(S): at 12:50

## 2019-01-24 RX ADMIN — Medication 25 MILLIGRAM(S): at 22:22

## 2019-01-24 RX ADMIN — SEVELAMER CARBONATE 800 MILLIGRAM(S): 2400 POWDER, FOR SUSPENSION ORAL at 13:45

## 2019-01-24 RX ADMIN — Medication 3 UNIT(S): at 08:37

## 2019-01-24 RX ADMIN — Medication 25 MILLIGRAM(S): at 05:40

## 2019-01-24 RX ADMIN — ATORVASTATIN CALCIUM 20 MILLIGRAM(S): 80 TABLET, FILM COATED ORAL at 22:22

## 2019-01-24 RX ADMIN — DULOXETINE HYDROCHLORIDE 60 MILLIGRAM(S): 30 CAPSULE, DELAYED RELEASE ORAL at 12:50

## 2019-01-24 RX ADMIN — CLOPIDOGREL BISULFATE 75 MILLIGRAM(S): 75 TABLET, FILM COATED ORAL at 12:50

## 2019-01-24 RX ADMIN — OXYCODONE AND ACETAMINOPHEN 1 TABLET(S): 5; 325 TABLET ORAL at 17:13

## 2019-01-24 RX ADMIN — Medication 1 TABLET(S): at 12:51

## 2019-01-24 RX ADMIN — Medication 2 UNIT(S): at 17:14

## 2019-01-24 RX ADMIN — CINACALCET 60 MILLIGRAM(S): 30 TABLET, FILM COATED ORAL at 12:50

## 2019-01-24 RX ADMIN — SEVELAMER CARBONATE 800 MILLIGRAM(S): 2400 POWDER, FOR SUSPENSION ORAL at 05:40

## 2019-01-24 RX ADMIN — INSULIN GLARGINE 8 UNIT(S): 100 INJECTION, SOLUTION SUBCUTANEOUS at 22:33

## 2019-01-24 RX ADMIN — Medication 81 MILLIGRAM(S): at 12:50

## 2019-01-24 RX ADMIN — SEVELAMER CARBONATE 800 MILLIGRAM(S): 2400 POWDER, FOR SUSPENSION ORAL at 22:23

## 2019-01-24 RX ADMIN — ERYTHROPOIETIN 10000 UNIT(S): 10000 INJECTION, SOLUTION INTRAVENOUS; SUBCUTANEOUS at 19:12

## 2019-01-24 RX ADMIN — Medication 25 MILLIGRAM(S): at 13:45

## 2019-01-24 RX ADMIN — CHLORHEXIDINE GLUCONATE 1 APPLICATION(S): 213 SOLUTION TOPICAL at 12:51

## 2019-01-24 NOTE — PROGRESS NOTE ADULT - SUBJECTIVE AND OBJECTIVE BOX
[facility]   [phone]  DIALYSIS NOTE  Chief Complaint: ESRD/Ongoing hemodialysis requirement.  24 hour events/subjective:    states no sob no chills         ALLERGIES & MEDICATIONS  --------------------------------------------------------------------------------  Allergies    No Known Allergies    Intolerances      Standing Inpatient Medications  aspirin enteric coated 81 milliGRAM(s) Oral daily  atorvastatin 20 milliGRAM(s) Oral at bedtime  chlorhexidine 4% Liquid 1 Application(s) Topical <User Schedule>  cinacalcet 60 milliGRAM(s) Oral daily  clopidogrel Tablet 75 milliGRAM(s) Oral daily  dextrose 5%. 1000 milliLiter(s) IV Continuous <Continuous>  dextrose 50% Injectable 12.5 Gram(s) IV Push once  dextrose 50% Injectable 25 Gram(s) IV Push once  dextrose 50% Injectable 25 Gram(s) IV Push once  DULoxetine 60 milliGRAM(s) Oral daily  epoetin azalea Injectable 19439 Unit(s) IV Push once  heparin  Injectable 5000 Unit(s) SubCutaneous every 8 hours  hydrALAZINE 25 milliGRAM(s) Oral every 8 hours  insulin glargine Injectable (LANTUS) 8 Unit(s) SubCutaneous at bedtime  insulin lispro (HumaLOG) corrective regimen sliding scale   SubCutaneous at bedtime  insulin lispro (HumaLOG) corrective regimen sliding scale   SubCutaneous three times a day before meals  insulin lispro Injectable (HumaLOG) 2 Unit(s) SubCutaneous three times a day before meals  metoprolol succinate ER 50 milliGRAM(s) Oral daily  multivitamin 1 Tablet(s) Oral daily  senna 2 Tablet(s) Oral at bedtime  sevelamer hydrochloride 800 milliGRAM(s) Oral three times a day    PRN Inpatient Medications  dextrose 40% Gel 15 Gram(s) Oral once PRN  glucagon  Injectable 1 milliGRAM(s) IntraMuscular once PRN  oxyCODONE    5 mG/acetaminophen 325 mG 1 Tablet(s) Oral every 4 hours PRN      REVIEW OF SYSTEMS  --------------------------------------------------------------------------------  no itching or rash  no fever or chill  no cp or palp   no sob or cough   no N/V/D/ no abd pain   ext no edema        VITALS/PHYSICAL EXAM  --------------------------------------------------------------------------------  T(C): 36.7 (01-24-19 @ 11:36), Max: 36.7 (01-24-19 @ 05:47)  HR: 65 (01-24-19 @ 11:36) (65 - 76)  BP: 137/71 (01-24-19 @ 11:36) (137/71 - 144/76)  RR: 18 (01-24-19 @ 11:36) (18 - 18)  SpO2: 98% (01-24-19 @ 11:36) (95% - 100%)  Wt(kg): --        01-23-19 @ 07:01  -  01-24-19 @ 07:00  --------------------------------------------------------  IN: 720 mL / OUT: 0 mL / NET: 720 mL      Physical Exam:  		    Gen: Non toxic comfortable appearing   	no jvd  	Pulm: decrease bs  + bases  rales or ronchi or wheezing  	CV: RRR, S1S2; no rub  	Abd: +BS, soft, nontender/nondistended  	: No suprapubic tenderness  	UE: Warm, no cyanosis  no clubbing,  no edema;   	LE: Warm, no cyanosis  no clubbing, LLE 1+  edema    LABS/STUDIES  --------------------------------------------------------------------------------              9.7    4.81  >-----------<  206      [01-23-19 @ 09:08]              30.9     135  |  94  |  12  ----------------------------<  125      [01-23-19 @ 07:15]  4.1   |  27  |  3.29        Ca     7.8     [01-23-19 @ 07:15]                    imp/suggest: ESRD      Hemodialysis Prescription:  	Access: avf  	Dialyzer: revaclear 300  	Blood Flow (mL/Min): 400  	Dialysate Flow (mL/Min): 600  	Target UF (Liters): 2liter   	Treatment Time: 3.5 h  	Potassium: 2k  	Calcium: 2.5  	  YOLANDA epogen 48090 U    Vitamin D     continue with hd   see hd flow sheet

## 2019-01-24 NOTE — PROGRESS NOTE ADULT - ASSESSMENT
63 year old female w/uncontrolled T1DM multiple complications and comorbidities. Here with hyperglycemia /DKA due to running out of  insulin. Now remains inpatient for workup for possible lung mass. BG values tightly controlled on present insulin regimen as PO intake is decreased today from baseline. Will decrease premeal and slightly adjust Lantus as pt to be NPO after midnight tonight for Bronchoscopy. BG goal (100-200mg/dl) given brittle DM.

## 2019-01-24 NOTE — CONSULT NOTE ADULT - PROBLEM SELECTOR RECOMMENDATION 4
- currently stable  - cardiology evaluation appreciated - patient is high risk for procedure but is presently optimized

## 2019-01-24 NOTE — CONSULT NOTE ADULT - SUBJECTIVE AND OBJECTIVE BOX
[facility] DIVISION OF PULMONARY, CRITICAL CARE AND SLEEP MEDICINE  PULMONARY CONSULTATION NOTE    Evaluation on behalf of Dr. Braden Thompson    NAME: NATHAN MEEKS  MEDICAL RECORD NUMBER: [id number]    CHIEF COMPLAINT: Patient is a 63y old  Female who presents with a chief complaint of DKA and Septic Shock (24 Jan 2019 08:35)      HISTORY OF PRESENT ILLNESS:       ====================BACKGROUND INFORMATION============================    FAMILY HISTORY: FAMILY HISTORY:  Family history of smoking  Family history of hypertension  Family history of cancer (Sibling)      PAST MEDICAL AND SURGICAL HISTORY: PAST MEDICAL & SURGICAL HISTORY:  CHF (congestive heart failure): EF 40-45%  Subclavian vein stenosis, left: s/p stent  DKA, type 1: 1/2015  ACS (acute coronary syndrome): 1/2015 - cath revealed 100% ostial stenosis not amenable to PCI - medical management  TIA (transient ischemic attack): x 2 - 8-9 years ago prior to ASD/VSD repair  CAD (coronary artery disease): s/p stents  Gout: past  CVA (cerebral infarction): with no residual, 8 yrs ago, prior to heart surgery - ST memory loss  Peripheral vascular disease: occluded left fem-pop bypass 5/2015  Diabetes mellitus type 1: Insulin Dependent - Medtronic  Minimed Paradigm Insulin Pump - Novolog  ESRD (end stage renal disease): dialysis  M, tue, thursday, saturday  Hyperlipidemia  Status post device closure of ASD: &quot;clamshell&quot;  History of cardiac catheterization: 1/2015 - no intervention  S/P femoral-popliteal bypass surgery: L and R in 2013 with graft; 5/2015 CFA angioplasty left and ileofemoral endarterectomywith vein patch angioplasty of left fem-pop bypass graft  Multiple vascular surgery both leg, left fempop bypass revision 11/2015  AV (arteriovenous fistula): Left AV graft; revision with stent placement 2-3 years ago  S/P cholecystectomy      ALLERGIES:Allergies    No Known Allergies    Intolerances        HOME MEDICATIONS:     OUTPATIENT PULMONARY DOCTOR: Dr. Braden Thompson    SOCIAL HISTORY:  TOBACCO USE: Former smoker   ALCOHOL: Denied   DRUGS: Denied   MARITAL STATUS:    EMPLOYMENT: Retired   EXPOSURES: None   RECENT TRAVELS: None   PETS: Dogs and Cat  CODE STATUS: Full Code     ====================REVIEW OF SYSTEMS=====================================  CONSTITUTIONAL: No complaints   CARDIOVASCULAR: Denies chest pain or palpitations   PULMONARY: Denies cough or shortness of breath, NO hemoptysis   GASTROINTESTINAL: Denies nausea, vomiting, or abdominal pain  [x] ALL OTHER REVIEW OF SYSTEMS ARE NEGATIVE   [] UNABLE TO OBTAIN REVIEW OF SYSTEMS DUE TO ______________      ====================PHYSICAL EXAM=========================================    VITALS: ICU Vital Signs Last 24 Hrs  T(C): 36.7 (24 Jan 2019 05:47), Max: 36.7 (24 Jan 2019 05:47)  T(F): 98 (24 Jan 2019 05:47), Max: 98 (24 Jan 2019 05:47)  HR: 76 (24 Jan 2019 05:47) (70 - 80)  BP: 143/76 (24 Jan 2019 05:47) (143/76 - 153/76)  RR: 18 (24 Jan 2019 05:47) (18 - 18)  SpO2: 100% (24 Jan 2019 05:47) (92% - 100%)      INTAKE and OUTPUT: I&O's Summary    23 Jan 2019 07:01  -  24 Jan 2019 07:00  --------------------------------------------------------  IN: 720 mL / OUT: 0 mL / NET: 720 mL        WEIGHT: Daily     Daily     GLUCOSE: CAPILLARY BLOOD GLUCOSE      POCT Blood Glucose.: 118 mg/dL (24 Jan 2019 07:53)      VENTILATOR SETTINGS: N/A     GENERAL: AAOx3, NAD, laying flat and comfortable  HEENT: NCAT, EOMI, MMM, nares clear, trachea midline   NECK: supple, no JVD   LYMPH NODES: no palpable supraclavicular LAD   CARDIOVASCULAR: RRR, S1S2  PULMONARY: good air entry, no wheeze, fine crackles right mid lung field, no accessory muscle use  ABDOMEN: soft, NT, ND, +BS  SKIN: warm and dry   EXTREMITIES: no clubbing or cyanosis   NEUROLOGIC: nonfocal exam, moves all extremities   PSYCHIATRIC: calm and in good spirits    ====================MEDICATIONS===========================================  MEDICATIONS  (STANDING):  aspirin enteric coated 81 milliGRAM(s) Oral daily  atorvastatin 20 milliGRAM(s) Oral at bedtime  chlorhexidine 4% Liquid 1 Application(s) Topical <User Schedule>  cinacalcet 60 milliGRAM(s) Oral daily  clopidogrel Tablet 75 milliGRAM(s) Oral daily  dextrose 5%. 1000 milliLiter(s) (50 mL/Hr) IV Continuous <Continuous>  dextrose 50% Injectable 12.5 Gram(s) IV Push once  dextrose 50% Injectable 25 Gram(s) IV Push once  dextrose 50% Injectable 25 Gram(s) IV Push once  DULoxetine 60 milliGRAM(s) Oral daily  heparin  Injectable 5000 Unit(s) SubCutaneous every 8 hours  hydrALAZINE 25 milliGRAM(s) Oral every 8 hours  insulin glargine Injectable (LANTUS) 10 Unit(s) SubCutaneous at bedtime  insulin lispro (HumaLOG) corrective regimen sliding scale   SubCutaneous at bedtime  insulin lispro (HumaLOG) corrective regimen sliding scale   SubCutaneous three times a day before meals  insulin lispro Injectable (HumaLOG) 3 Unit(s) SubCutaneous three times a day before meals  metoprolol succinate ER 50 milliGRAM(s) Oral daily  multivitamin 1 Tablet(s) Oral daily  senna 2 Tablet(s) Oral at bedtime  sevelamer hydrochloride 800 milliGRAM(s) Oral three times a day      MEDICATIONS  (PRN):  dextrose 40% Gel 15 Gram(s) Oral once PRN Blood Glucose LESS THAN 70 milliGRAM(s)/deciliter  glucagon  Injectable 1 milliGRAM(s) IntraMuscular once PRN Glucose LESS THAN 70 milligrams/deciliter  oxyCODONE    5 mG/acetaminophen 325 mG 1 Tablet(s) Oral every 4 hours PRN Moderate Pain (4 - 6)      ====================LABORATORY RESULTS====================================  CBC Full  -  ( 23 Jan 2019 09:08 )  WBC Count : 4.81 K/uL  Hemoglobin : 9.7 g/dL  Hematocrit : 30.9 %  Platelet Count - Automated : 206 K/uL  Mean Cell Volume : 105.1 fl  Mean Cell Hemoglobin : 33.0 pg  Mean Cell Hemoglobin Concentration : 31.4 gm/dL                                      01-23    135  |  94<L>  |  12  ----------------------------<  125<H>  4.1   |  27  |  3.29<H>    Ca    7.8<L>      23 Jan 2019 07:15       Creatinine Trend: 3.29<--, 3.84<--, 5.65<--, 5.22<--, 4.89<--, 4.76<--      ====================RADIOLOGY and ECHOCARDIOGRAPHY=======================    CXR:    CT CHEST: < from: CT Chest No Cont (01.23.19 @ 16:02) >  Mediastinum/Vessels/Heart:     Left subclavian stent is noted. No comment can be made regarding patency on this noncontrast exam. Thyroid gland is unremarkable. Aorta is normal in size. Pulmonary arteries are enlarged consistent with pulmonary hypertension. Right paratracheal and subcarinal lymphadenopathy is noted, with the right paratracheal node stable and in the subcarinal node demonstrating slight interval enlargement.    Lungs/Pleura/Airways: New small right pleural effusion with adjacent atelectasis. Trace left pleural effusion.    New unilateral interlobular septal thickening on the right versus left.   There is an apparent right hilar mass measuring 2.3 x 2.8 cm, demonstrating increase in size relative to previous exam.    Bilateral groundglass opacities in the upper lobes measuring 0.1 cm each, essentially unchanged since prior.    Visualized abdomen:     Unremarkable noncontrast appearance of the upper abdomen    Bones and soft tissues:     No suspicious osseous lesions. Degenerative changes noted throughout the spine.    IMPRESSION:    New unilateral interlobular septal thickening on the right with right pleural effusion.Findings are concerning for lymphangitic spread of   malignancy. An apparent right hilar/suprahilar mass is identified measuring 2.3 x 2.8 cm. However this is unclear on this noncontrast   examination. Consider repeat chest CT with IV contrast for further evaluation.    Bilateral upper lobe groundglass opacities are 1.1 cm each and are unchanged.    < end of copied text >      ECHOCARDIOGRAPHY: [facility] DIVISION OF PULMONARY, CRITICAL CARE AND SLEEP MEDICINE  PULMONARY CONSULTATION NOTE    Evaluation on behalf of Dr. [facility]    NAME: NATHAN MEEKS  MEDICAL RECORD NUMBER: [id number]    CHIEF COMPLAINT: Patient is a 63y old  Female who presents with a chief complaint of DKA and Septic Shock (24 Jan 2019 08:35)      HISTORY OF PRESENT ILLNESS:   Patient is a 64 yo F with ESRD (on HD M/Tu/Th/Sa), type 1 DM, CAD, HFrEF (EF 50% on TTE from 10/18), TIA, and PVD, who presented from HD with complaints of N/V and hyperglycemia. Patient previously on indulin pump, however as per report, patient was transitioned to basal/bolus regimen. Patient ran out of her lantus prior to admission and therefore did not take it. Patient presented to HD, where she was complaining of SOB. She was therefore given extra fluid removal, and was also found to be hyperglycemic at that time. Patient continued to feel SOB after HD, and was brought to the ED at that time. She was admitted to the MICU and treated for DKA.    She was transitioned off insulin drip and transferred to the floor. She initially continued to have SOB so a CT chest was done to followup prior abnormalities seen in October 2018 - nonspecific nodules. Interestingly, her symptoms have improved but her CT is concerning for possible underlying malignancy - given presence of interlobular septal thickening and mediastinal adenopathy.    Patient is a former smoker. She denies weight loss.     She appears euvolemic on exam and is not in respiratory distress. She is not in decompensated heart failure. She has been evaluated by her cardiologist, and although at higher risk given her underlying medical history, she is presently optimized for bronchoscopic procedure.    ====================BACKGROUND INFORMATION============================    FAMILY HISTORY: FAMILY HISTORY:  Family history of smoking  Family history of hypertension  Family history of cancer (Sibling)      PAST MEDICAL AND SURGICAL HISTORY: PAST MEDICAL & SURGICAL HISTORY:  CHF (congestive heart failure): EF 40-45%  Subclavian vein stenosis, left: s/p stent  DKA, type 1:   ACS (acute coronary syndrome): 1/2015 - cath revealed 100% ostial stenosis not amenable to PCI - medical management  TIA (transient ischemic attack): x 2 - 8-9 years ago prior to ASD/VSD repair  CAD (coronary artery disease): s/p stents  Gout: past  CVA (cerebral infarction): with no residual, 8 yrs ago, prior to heart surgery - ST memory loss  Peripheral vascular disease: occluded left fem-pop bypass 5/2015  Diabetes mellitus type 1: Insulin Dependent - Medtronic  Minimed Paradigm Insulin Pump - Novolog  ESRD (end stage renal disease): dialysis  M, tue, thursday, saturday  Hyperlipidemia  Status post device closure of ASD: &quot;clamshell&quot;  History of cardiac catheterization: 1/2015 - no intervention  S/P femoral-popliteal bypass surgery: L and R in 2013 with graft; 5/2015 CFA angioplasty left and ileofemoral endarterectomywith vein patch angioplasty of left fem-pop bypass graft  Multiple vascular surgery both leg, left fempop bypass revision 11/2015  AV (arteriovenous fistula): Left AV graft; revision with stent placement 2-3 years ago  S/P cholecystectomy      ALLERGIES:Allergies:  No Known Allergies    HOME MEDICATIONS: Reviewed     OUTPATIENT PULMONARY DOCTOR: Dr. Braden Thompson    SOCIAL HISTORY:  TOBACCO USE: Former smoker   ALCOHOL: Denied   DRUGS: Denied   MARITAL STATUS:    EMPLOYMENT: Retired   EXPOSURES: None   RECENT TRAVELS: None   PETS: Dogs and Cat  CODE STATUS: Full Code     ====================REVIEW OF SYSTEMS=====================================  CONSTITUTIONAL: No complaints   CARDIOVASCULAR: Denies chest pain or palpitations   PULMONARY: Denies cough or shortness of breath, NO hemoptysis   GASTROINTESTINAL: Denies nausea, vomiting, or abdominal pain  [x] ALL OTHER REVIEW OF SYSTEMS ARE NEGATIVE   [] UNABLE TO OBTAIN REVIEW OF SYSTEMS DUE TO ______________      ====================PHYSICAL EXAM=========================================    VITALS: ICU Vital Signs Last 24 Hrs  T(C): 36.7 (24 Jan 2019 05:47), Max: 36.7 (24 Jan 2019 05:47)  T(F): 98 (24 Jan 2019 05:47), Max: 98 (24 Jan 2019 05:47)  HR: 76 (24 Jan 2019 05:47) (70 - 80)  BP: 143/76 (24 Jan 2019 05:47) (143/76 - 153/76)  RR: 18 (24 Jan 2019 05:47) (18 - 18)  SpO2: 100% (24 Jan 2019 05:47) (92% - 100%)      INTAKE and OUTPUT: I&O's Summary    23 Jan 2019 07:01  -  24 Jan 2019 07:00  --------------------------------------------------------  IN: 720 mL / OUT: 0 mL / NET: 720 mL        WEIGHT: Daily     Daily     GLUCOSE: CAPILLARY BLOOD GLUCOSE      POCT Blood Glucose.: 118 mg/dL (24 Jan 2019 07:53)      VENTILATOR SETTINGS: N/A     GENERAL: AAOx3, NAD, laying flat and comfortable  HEENT: NCAT, EOMI, MMM, nares clear, trachea midline   NECK: supple, no JVD   LYMPH NODES: no palpable supraclavicular LAD   CARDIOVASCULAR: RRR, S1S2  PULMONARY: good air entry, no wheeze, fine crackles right mid lung field, no accessory muscle use  ABDOMEN: soft, NT, ND, +BS  SKIN: warm and dry   EXTREMITIES: no clubbing or cyanosis   NEUROLOGIC: nonfocal exam, moves all extremities   PSYCHIATRIC: calm and in good spirits    ====================MEDICATIONS===========================================  MEDICATIONS  (STANDING):  aspirin enteric coated 81 milliGRAM(s) Oral daily  atorvastatin 20 milliGRAM(s) Oral at bedtime  chlorhexidine 4% Liquid 1 Application(s) Topical <User Schedule>  cinacalcet 60 milliGRAM(s) Oral daily  clopidogrel Tablet 75 milliGRAM(s) Oral daily  dextrose 5%. 1000 milliLiter(s) (50 mL/Hr) IV Continuous <Continuous>  dextrose 50% Injectable 12.5 Gram(s) IV Push once  dextrose 50% Injectable 25 Gram(s) IV Push once  dextrose 50% Injectable 25 Gram(s) IV Push once  DULoxetine 60 milliGRAM(s) Oral daily  heparin  Injectable 5000 Unit(s) SubCutaneous every 8 hours  hydrALAZINE 25 milliGRAM(s) Oral every 8 hours  insulin glargine Injectable (LANTUS) 10 Unit(s) SubCutaneous at bedtime  insulin lispro (HumaLOG) corrective regimen sliding scale   SubCutaneous at bedtime  insulin lispro (HumaLOG) corrective regimen sliding scale   SubCutaneous three times a day before meals  insulin lispro Injectable (HumaLOG) 3 Unit(s) SubCutaneous three times a day before meals  metoprolol succinate ER 50 milliGRAM(s) Oral daily  multivitamin 1 Tablet(s) Oral daily  senna 2 Tablet(s) Oral at bedtime  sevelamer hydrochloride 800 milliGRAM(s) Oral three times a day      MEDICATIONS  (PRN):  dextrose 40% Gel 15 Gram(s) Oral once PRN Blood Glucose LESS THAN 70 milliGRAM(s)/deciliter  glucagon  Injectable 1 milliGRAM(s) IntraMuscular once PRN Glucose LESS THAN 70 milligrams/deciliter  oxyCODONE    5 mG/acetaminophen 325 mG 1 Tablet(s) Oral every 4 hours PRN Moderate Pain (4 - 6)      ====================LABORATORY RESULTS====================================  CBC Full  -  ( 23 Jan 2019 09:08 )  WBC Count : 4.81 K/uL  Hemoglobin : 9.7 g/dL  Hematocrit : 30.9 %  Platelet Count - Automated : 206 K/uL  Mean Cell Volume : 105.1 fl  Mean Cell Hemoglobin : 33.0 pg  Mean Cell Hemoglobin Concentration : 31.4 gm/dL                                      01-23    135  |  94<L>  |  12  ----------------------------<  125<H>  4.1   |  27  |  3.29<H>    Ca    7.8<L>      23 Jan 2019 07:15       Creatinine Trend: 3.29<--, 3.84<--, 5.65<--, 5.22<--, 4.89<--, 4.76<--      ====================RADIOLOGY and ECHOCARDIOGRAPHY=======================    CXR:    CT CHEST: < from: CT Chest No Cont (01.23.19 @ 16:02) >  Mediastinum/Vessels/Heart:     Left subclavian stent is noted. No comment can be made regarding patency on this noncontrast exam. Thyroid gland is unremarkable. Aorta is normal in size. Pulmonary arteries are enlarged consistent with pulmonary hypertension. Right paratracheal and subcarinal lymphadenopathy is noted, with the right paratracheal node stable and in the subcarinal node demonstrating slight interval enlargement.    Lungs/Pleura/Airways: New small right pleural effusion with adjacent atelectasis. Trace left pleural effusion.    New unilateral interlobular septal thickening on the right versus left.   There is an apparent right hilar mass measuring 2.3 x 2.8 cm, demonstrating increase in size relative to previous exam.    Bilateral groundglass opacities in the upper lobes measuring 0.1 cm each, essentially unchanged since prior.    Visualized abdomen:     Unremarkable noncontrast appearance of the upper abdomen    Bones and soft tissues:     No suspicious osseous lesions. Degenerative changes noted throughout the spine.    IMPRESSION:    New unilateral interlobular septal thickening on the right with right pleural effusion.Findings are concerning for lymphangitic spread of   malignancy. An apparent right hilar/suprahilar mass is identified measuring 2.3 x 2.8 cm. However this is unclear on this noncontrast   examination. Consider repeat chest CT with IV contrast for further evaluation.    Bilateral upper lobe groundglass opacities are 1.1 cm each and are unchanged.    < end of copied text >      ECHOCARDIOGRAPHY:

## 2019-01-24 NOTE — DIETITIAN INITIAL EVALUATION ADULT. - PHYSICAL APPEARANCE
well nourished/Performed Nutrition Focused Physical Exam with pt's consent and no signs of muscle/fat loss noted in any area.

## 2019-01-24 NOTE — DIETITIAN INITIAL EVALUATION ADULT. - ADHERENCE
Pt reports following a diet for DM and HD, able to recall foods with carbohydrates and foods high in phosphorus; states consuming "a lot" of turkey and chicken due to increased protein needs. Reports taking Multivitamin PTA. Reports monitoring BG 6xday with ranges between 140-150 mg/dl and states taking Lantus and Humalog PTA; HbA1c per chart (11/16/2018) 8.6% - suspected fair BG control. Reports obtaining weights every 2 days in dialysis center. Noted pt has received nutrition therapy education as per previous RD notes./fair

## 2019-01-24 NOTE — PROGRESS NOTE ADULT - PROBLEM SELECTOR PLAN 1
-test BG AC/HS  -Change Lantus 8 units for tonight (NPO dose)  -Decrease Humalog 2 units AC meals for now w/decreased PO intake  -c/w Humalog low correction scale AC and Low HS scale  When cleared for discharge:  -Please write Rxs for: Solo Star Insulin pen (1 box)/Humalog Kwik insulin pen(1 box)/Fany insulin pen needles(1 box).   -pt to f/u with pvt endocrinologist Dr Ley  -Consider home care visit to make sure pt has all DM supplies at home and make sure she is taking insulin as ordered.   -Plan discussed with pt and medicine NP  pager: [phone]

## 2019-01-24 NOTE — PROGRESS NOTE ADULT - SUBJECTIVE AND OBJECTIVE BOX
Cardiovascular Disease Progress Note    Overnight events: No acute events overnight.  results of ct chest noted. denies any sob/cp/pnd/orthopnea  Otherwise review of systems negative    Objective Findings:  T(C): 36.7 (01-24-19 @ 05:47), Max: 36.7 (01-24-19 @ 05:47)  HR: 76 (01-24-19 @ 05:47) (70 - 80)  BP: 143/76 (01-24-19 @ 05:47) (143/76 - 153/76)  RR: 18 (01-24-19 @ 05:47) (18 - 18)  SpO2: 100% (01-24-19 @ 05:47) (92% - 100%)  Wt(kg): --  Daily     Daily       Physical Exam:  Gen: NAD  HEENT: EOMI  CV: RRR, normal S1 + S2, no m/r/g  Lungs: CTAB  Abd: soft, non-tender  Ext: No edema        Laboratory Data:                        9.7    4.81  )-----------( 206      ( 23 Jan 2019 09:08 )             30.9     01-23    135  |  94<L>  |  12  ----------------------------<  125<H>  4.1   |  27  |  3.29<H>    Ca    7.8<L>      23 Jan 2019 07:15                Inpatient Medications:  MEDICATIONS  (STANDING):  aspirin enteric coated 81 milliGRAM(s) Oral daily  atorvastatin 20 milliGRAM(s) Oral at bedtime  chlorhexidine 4% Liquid 1 Application(s) Topical <User Schedule>  cinacalcet 60 milliGRAM(s) Oral daily  clopidogrel Tablet 75 milliGRAM(s) Oral daily  dextrose 5%. 1000 milliLiter(s) (50 mL/Hr) IV Continuous <Continuous>  dextrose 50% Injectable 12.5 Gram(s) IV Push once  dextrose 50% Injectable 25 Gram(s) IV Push once  dextrose 50% Injectable 25 Gram(s) IV Push once  DULoxetine 60 milliGRAM(s) Oral daily  heparin  Injectable 5000 Unit(s) SubCutaneous every 8 hours  hydrALAZINE 25 milliGRAM(s) Oral every 8 hours  insulin glargine Injectable (LANTUS) 10 Unit(s) SubCutaneous at bedtime  insulin lispro (HumaLOG) corrective regimen sliding scale   SubCutaneous at bedtime  insulin lispro (HumaLOG) corrective regimen sliding scale   SubCutaneous three times a day before meals  insulin lispro Injectable (HumaLOG) 3 Unit(s) SubCutaneous three times a day before meals  metoprolol succinate ER 50 milliGRAM(s) Oral daily  multivitamin 1 Tablet(s) Oral daily  senna 2 Tablet(s) Oral at bedtime  sevelamer hydrochloride 800 milliGRAM(s) Oral three times a day      Assessment:  - DKA in the setting of medication non-compliance and possible sepsis  -hypotension, shock -> likely vasodilatory  -ischemic cardiomyopathy, cad s/p multiple stents  -pulmonary edema  -esrd on hd  -PAD s/p prior intervention   -remote TIA        Recs:  -DKA resolved. f/u endo recs  -no true ischemic sx. ekg with nonspecific ST changes. would defer ischemic work up for now unless  -hx of prolonged qtc. avoid qtc prolonging meds  -CT chest findings concernign for malignancy. follows with dr drake and dr michael. would notify them of findings and consult them for recommendations   -c/w asa, plavix, statin for ischemic cardiomyopathy/CAD/PAD if not contraindicated  -would resume bb and hydralazine for ischemic cardiomyopathy and HTN   -hd per renal. appears relatively euvolemic  -dvt ppx        Over 25 minutes spent on total encounter; more than 50% of the visit was spent counseling and/or coordinating care by the attending physician.      Julián Biswas MD   Cardiovascular Disease  [phone] Cardiovascular Disease Progress Note    Overnight events: No acute events overnight.  results of ct chest noted. denies any sob/cp/pnd/orthopnea  Otherwise review of systems negative    Objective Findings:  T(C): 36.7 (01-24-19 @ 05:47), Max: 36.7 (01-24-19 @ 05:47)  HR: 76 (01-24-19 @ 05:47) (70 - 80)  BP: 143/76 (01-24-19 @ 05:47) (143/76 - 153/76)  RR: 18 (01-24-19 @ 05:47) (18 - 18)  SpO2: 100% (01-24-19 @ 05:47) (92% - 100%)  Wt(kg): --  Daily     Daily       Physical Exam:  Gen: NAD  HEENT: EOMI  CV: RRR, normal S1 + S2, no m/r/g  Lungs: CTAB  Abd: soft, non-tender  Ext: No edema        Laboratory Data:                        9.7    4.81  )-----------( 206      ( 23 Jan 2019 09:08 )             30.9     01-23    135  |  94<L>  |  12  ----------------------------<  125<H>  4.1   |  27  |  3.29<H>    Ca    7.8<L>      23 Jan 2019 07:15                Inpatient Medications:  MEDICATIONS  (STANDING):  aspirin enteric coated 81 milliGRAM(s) Oral daily  atorvastatin 20 milliGRAM(s) Oral at bedtime  chlorhexidine 4% Liquid 1 Application(s) Topical <User Schedule>  cinacalcet 60 milliGRAM(s) Oral daily  clopidogrel Tablet 75 milliGRAM(s) Oral daily  dextrose 5%. 1000 milliLiter(s) (50 mL/Hr) IV Continuous <Continuous>  dextrose 50% Injectable 12.5 Gram(s) IV Push once  dextrose 50% Injectable 25 Gram(s) IV Push once  dextrose 50% Injectable 25 Gram(s) IV Push once  DULoxetine 60 milliGRAM(s) Oral daily  heparin  Injectable 5000 Unit(s) SubCutaneous every 8 hours  hydrALAZINE 25 milliGRAM(s) Oral every 8 hours  insulin glargine Injectable (LANTUS) 10 Unit(s) SubCutaneous at bedtime  insulin lispro (HumaLOG) corrective regimen sliding scale   SubCutaneous at bedtime  insulin lispro (HumaLOG) corrective regimen sliding scale   SubCutaneous three times a day before meals  insulin lispro Injectable (HumaLOG) 3 Unit(s) SubCutaneous three times a day before meals  metoprolol succinate ER 50 milliGRAM(s) Oral daily  multivitamin 1 Tablet(s) Oral daily  senna 2 Tablet(s) Oral at bedtime  sevelamer hydrochloride 800 milliGRAM(s) Oral three times a day      Assessment:  - DKA in the setting of medication non-compliance and possible sepsis  -hypotension, shock -> likely vasodilatory  -ischemic cardiomyopathy, cad s/p multiple stents  -pulmonary edema  -esrd on hd  -PAD s/p prior intervention   -remote TIA        Recs:  -DKA resolved. f/u endo recs  -no true ischemic sx. ekg with nonspecific ST changes. would defer ischemic work up for now unless  -hx of prolonged qtc. avoid qtc prolonging meds  -CT chest findings concernign for malignancy. follows with dr drake and dr michael. would notify them of findings and consult them for recommendations   -c/w asa, plavix, statin for ischemic cardiomyopathy/CAD/PAD if not contraindicated  -would resume bb and hydralazine for ischemic cardiomyopathy and HTN   -hd per renal. appears relatively euvolemic  -dvt ppx      Addendum:  -patient remains at elevated cardiovascular risk. she remains medically optimized without any ischemic or heart failure symptoms. can proceed to bronchoscopy under GA without further cardiac workup. c/w anti-platelet agents and beta blockers richardson-operatively as tolerates.    Over 25 minutes spent on total encounter; more than 50% of the visit was spent counseling and/or coordinating care by the attending physician.      Julián Biswas MD   Cardiovascular Disease  [phone]

## 2019-01-24 NOTE — DIETITIAN INITIAL EVALUATION ADULT. - NS FNS WEIGHT CHANGE REASON
Pt denies weight changes PTA, reports UBW approximately 122 pounds. Weight as per previous RD notes (01/14/2018) 116.6 pounds -> (03/02/2018) 119.2 pounds -> (05/01/2018) 116.8 pounds -> (12/19/2018) 118 pounds. Weight as per flow sheets (01/23) 121.3 pounds -?accuracy of weight fluctuations likely due to fluid shifts.

## 2019-01-24 NOTE — DIETITIAN INITIAL EVALUATION ADULT. - NS AS NUTRI INTERV MEALS SNACK
Recommend Consistent Carbohydrate with snack + renal + DASH/TLC diet, will continue to monitor PO intake and electrolytes. Encourage PO intake, obtain food preferences, provide feeding assistance as needed.

## 2019-01-24 NOTE — PROGRESS NOTE ADULT - SUBJECTIVE AND OBJECTIVE BOX
Diabetes Follow up note:  Interval Hx:  63 year old female w/uncontrolled T1DM all known microvascular complications, CAD, CHF (EF 50% 10/2018), TIA, PVD, ESRD HD. Here with hyperglycemia /DKA due to missing insulin (pt states she ran out of insulin and called endo but didn't get Rx) Also found to have fluid overload and questionable sepsis now found to have R hilar mass. Plan for bronchoscopy tomorrow so pt to be NPO after midnight. BG value of 72mg/dl prior to lunch today. Pt seen at bedside. Reports had poor appetite this morning, ate a little better at lunch. Pt endorsed stomach ache and had to use bathroom while I was at bedside.     Review of Systems:  General: "I'm not eating much"    GI: Tolerating POs without any N/V/D/ABD PAIN.  CV: No CP/SOB  ENDO: No S&Sx of hypoglycemia  MEDS:  atorvastatin 20 milliGRAM(s) Oral at bedtime  cinacalcet 60 milliGRAM(s) Oral daily    insulin glargine Injectable (LANTUS) 10 Unit(s) SubCutaneous at bedtime  insulin lispro (HumaLOG) corrective regimen sliding scale   SubCutaneous at bedtime  insulin lispro (HumaLOG) corrective regimen sliding scale   SubCutaneous three times a day before meals  insulin lispro Injectable (HumaLOG) 3 Unit(s) SubCutaneous three times a day before meals      Allergies    No Known Allergies        PE:  General: Female lying in bed. NAD.   Vital Signs Last 24 Hrs  T(C): 36.7 (24 Jan 2019 11:36), Max: 36.7 (24 Jan 2019 05:47)  T(F): 98 (24 Jan 2019 11:36), Max: 98 (24 Jan 2019 05:47)  HR: 65 (24 Jan 2019 11:36) (65 - 76)  BP: 137/71 (24 Jan 2019 11:36) (137/71 - 144/76)  BP(mean): --  RR: 18 (24 Jan 2019 11:36) (18 - 18)  SpO2: 98% (24 Jan 2019 11:36) (95% - 100%)  Abd: Soft, NT,ND,   Extremities: Warm. no edema  Neuro: A&O X3    LABS:    POCT Blood Glucose.: 72 mg/dL (01-24-19 @ 11:56)  POCT Blood Glucose.: 118 mg/dL (01-24-19 @ 07:53)  POCT Blood Glucose.: 175 mg/dL (01-23-19 @ 22:01)  POCT Blood Glucose.: 208 mg/dL (01-23-19 @ 16:46)  POCT Blood Glucose.: 169 mg/dL (01-23-19 @ 11:47)  POCT Blood Glucose.: 194 mg/dL (01-23-19 @ 08:15)  POCT Blood Glucose.: 91 mg/dL (01-22-19 @ 22:00)  POCT Blood Glucose.: 169 mg/dL (01-22-19 @ 17:56)  POCT Blood Glucose.: 132 mg/dL (01-22-19 @ 16:45)  POCT Blood Glucose.: 119 mg/dL (01-22-19 @ 13:41)  POCT Blood Glucose.: 303 mg/dL (01-22-19 @ 08:00)  POCT Blood Glucose.: 201 mg/dL (01-21-19 @ 21:42)  POCT Blood Glucose.: 179 mg/dL (01-21-19 @ 16:39)                            9.7    4.81  )-----------( 206      ( 23 Jan 2019 09:08 )             30.9       01-23    135  |  94<L>  |  12  ----------------------------<  125<H>  4.1   |  27  |  3.29<H>    Ca    7.8<L>      23 Jan 2019 07:15        Hemoglobin A1C, Whole Blood: 8.6 % <H> [4.0 - 5.6] (11-16-18 @ 20:14)            Contact number: isabel [phone] or [phone]

## 2019-01-24 NOTE — CONSULT NOTE ADULT - PROBLEM SELECTOR RECOMMENDATION 3
- continue with Lipitor 20 mg qhs
- continue HD as per nephrology  - she is planned for HD today and then again Saturday

## 2019-01-24 NOTE — DIETITIAN INITIAL EVALUATION ADULT. - NUTRITION INTERVENTION
Nutrition Education/Meals and Snack Vitamin/Nutrition Education/Collaboration and Referral of Nutrition Care/Meals and Snack

## 2019-01-24 NOTE — CONSULT NOTE ADULT - PROBLEM SELECTOR RECOMMENDATION 5
- monitor fingersticks  - have put patient on for the first AM case so that she is not NPO for too long of a period - she will need to be NPO past midnight tonight  - continue insulin as per endocrinology

## 2019-01-24 NOTE — CONSULT NOTE ADULT - ATTENDING COMMENTS
NOTE INCOMPLETE    Patient has a CT scan which is very concerning for lung cancer. She is as optimized from a pulmonary standpoint as she will be for a possible bronchoscopic intervention. I have tentatively scheduled her for an EBUS tomorrow at 8AM.  - Please make patient NPO after midnight for procedure tomorrow  - Please obtain cardiology risk stratification/clearance for this planned procedure. Procedure will be done with general anesthesia and should take about 1 hour  - Please send full set of labs including type and screen in AM  - Patient is planned for HD today. Evaluation on behalf of Dr. Braden Thompson    Patient has a CT scan which is very concerning for lung cancer. She is as optimized from a pulmonary standpoint as she will be for a possible bronchoscopic intervention. I have tentatively scheduled her for an EBUS tomorrow at 8AM.  - Please make patient NPO after midnight for procedure tomorrow  - Cardiology risk stratification appreciated - patient carries an increased risk due to her medical conditions but is currently optimized  - Please send full set of labs including PT/INR, and type and screen in AM  - Patient is planned for HD today    -Niko Villa MD  [phone] Evaluation on behalf of Dr. Feliciano Trust    Patient has a CT scan which is very concerning for lung cancer. She is as optimized from a pulmonary standpoint as she will be for a possible bronchoscopic intervention. I have tentatively scheduled her for an EBUS tomorrow at 8AM. Given that patient is on Aspirin and Plavix, and is high risk for cardiac events if discontinued, will plan for only BAL and EBUS TBNA with 22G needle if no bleeding events.   - Please make patient NPO after midnight for procedure tomorrow  - Cardiology risk stratification appreciated - patient carries an increased risk due to her medical conditions but is currently optimized  - Please send full set of labs including PT/INR, and type and screen in AM  - Patient is planned for HD today    -Niko Villa MD  [phone] Evaluation on behalf of Dr. Braden Thompson    Patient has a CT scan which is very concerning for lung cancer. She is as optimized from a pulmonary standpoint as she will be for a possible bronchoscopic intervention. I had planned to schedule her for an EBUS tomorrow at 8AM however she is on Plavix and I think the risk of bleeding would be too high.    - Cardiology risk stratification appreciated - patient carries an increased risk due to her medical conditions but is currently optimized  - Patient is planned for HD today    -Niko Villa MD  [phone]

## 2019-01-24 NOTE — PROGRESS NOTE ADULT - ASSESSMENT
63 f with  DKA- resolving. Endocrine follow re insulin pump.  ESRD- continue HD. Nephrology follow.  Observe off antibiotics  CAD stable.  Abnormal CT lung- Pulmonary evaluation Dr. Tate/ Dr. Russell. Unable to have bronchoscopy now as patient is on Plavix. Will have procedure done as OTP. d/w Pulmonary fellow.  Anxiety control  DCP home in am if stable.  Pierce Viera MD pager [phone]

## 2019-01-24 NOTE — PROGRESS NOTE ADULT - SUBJECTIVE AND OBJECTIVE BOX
Patient is a 63y old  Female who presents with a chief complaint of DKA and Septic Shock (24 Jan 2019 16:08)      SUBJECTIVE / OVERNIGHT EVENTS: Comfortable without new complaints.   Review of Systems  chest pain no  palpitations no  sob no  nausea no  headache no    MEDICATIONS  (STANDING):  aspirin enteric coated 81 milliGRAM(s) Oral daily  atorvastatin 20 milliGRAM(s) Oral at bedtime  chlorhexidine 4% Liquid 1 Application(s) Topical <User Schedule>  cinacalcet 60 milliGRAM(s) Oral daily  clopidogrel Tablet 75 milliGRAM(s) Oral daily  dextrose 5%. 1000 milliLiter(s) (50 mL/Hr) IV Continuous <Continuous>  dextrose 50% Injectable 12.5 Gram(s) IV Push once  dextrose 50% Injectable 25 Gram(s) IV Push once  dextrose 50% Injectable 25 Gram(s) IV Push once  DULoxetine 60 milliGRAM(s) Oral daily  epoetin azalea Injectable 99166 Unit(s) IV Push once  heparin  Injectable 5000 Unit(s) SubCutaneous every 8 hours  hydrALAZINE 25 milliGRAM(s) Oral every 8 hours  insulin glargine Injectable (LANTUS) 8 Unit(s) SubCutaneous at bedtime  insulin lispro (HumaLOG) corrective regimen sliding scale   SubCutaneous at bedtime  insulin lispro (HumaLOG) corrective regimen sliding scale   SubCutaneous three times a day before meals  insulin lispro Injectable (HumaLOG) 2 Unit(s) SubCutaneous three times a day before meals  metoprolol succinate ER 50 milliGRAM(s) Oral daily  multivitamin 1 Tablet(s) Oral daily  senna 2 Tablet(s) Oral at bedtime  sevelamer hydrochloride 800 milliGRAM(s) Oral three times a day    MEDICATIONS  (PRN):  dextrose 40% Gel 15 Gram(s) Oral once PRN Blood Glucose LESS THAN 70 milliGRAM(s)/deciliter  glucagon  Injectable 1 milliGRAM(s) IntraMuscular once PRN Glucose LESS THAN 70 milligrams/deciliter  oxyCODONE    5 mG/acetaminophen 325 mG 1 Tablet(s) Oral every 4 hours PRN Moderate Pain (4 - 6)      Vital Signs Last 24 Hrs  T(C): 36.7 (24 Jan 2019 11:36), Max: 36.7 (24 Jan 2019 05:47)  T(F): 98 (24 Jan 2019 11:36), Max: 98 (24 Jan 2019 05:47)  HR: 65 (24 Jan 2019 11:36) (65 - 76)  BP: 137/71 (24 Jan 2019 11:36) (137/71 - 144/76)  BP(mean): --  RR: 18 (24 Jan 2019 11:36) (18 - 18)  SpO2: 98% (24 Jan 2019 11:36) (95% - 100%)    PHYSICAL EXAM:  GENERAL: NAD, well-developed  HEAD:  Atraumatic, Normocephalic  EYES: EOMI, PERRLA, conjunctiva and sclera clear  NECK: Supple, No JVD  CHEST/LUNG: Clear to auscultation bilaterally; No wheeze  HEART: Regular rate and rhythm; No murmurs, rubs, or gallops  ABDOMEN: Soft, Nontender, Nondistended; Bowel sounds present  EXTREMITIES:  2+ Peripheral Pulses, No clubbing, cyanosis, or edema  PSYCH: AAOx3  NEUROLOGY: non-focal  SKIN: No rashes or lesions    LABS:                        9.7    4.81  )-----------( 206      ( 23 Jan 2019 09:08 )             30.9     01-23    135  |  94<L>  |  12  ----------------------------<  125<H>  4.1   |  27  |  3.29<H>    Ca    7.8<L>      23 Jan 2019 07:15                  RADIOLOGY & ADDITIONAL TESTS:    Imaging Personally Reviewed:    Consultant(s) Notes Reviewed:      Care Discussed with Consultants/Other Providers:

## 2019-01-24 NOTE — DIETITIAN INITIAL EVALUATION ADULT. - NS AS NUTRI INTERV ED CONTENT3
Attempted to provide education on CHF, T1DM, and ESRD on HD nutrition therapy - pt refused at this time. Briefly reviewed HbA1c, carbohydrates in portion sizes, and foods high in potassium and phosphorus. Pt made aware RD remains available.

## 2019-01-24 NOTE — DIETITIAN INITIAL EVALUATION ADULT. - ENERGY NEEDS
Ht: 64 inches Wt: 121.2 pounds BMI: 20.8 kg/m2 IBW: 120 (+/-10%) 100.8 %IBW  Pertinent information: Pt 62 y/o F with PMH: ESRD on HD, T1DM, CAD, CHF, TIA, PVD, acute coronary syndrome, CVA, HLD, admitted with nausea and vomiting 2/2 DKA with coma, septic shock, respiratory failure, S/P CT with probable lunch cancer - possible bronchoscopic procedure tomorrow (01/25), PE, last HD on (01/22).   No noted edema as per flow sheets. Skin: no noted pressure injuries as per documentation.

## 2019-01-24 NOTE — CONSULT NOTE ADULT - PROBLEM SELECTOR RECOMMENDATION 9
- AG now closed and glucose in 100s.   - Patient can be transitioned from insulin gtt to Lantus 6U SubQ (overlap gtt by 1 hour)  - Start CHO diet and Humalog 2U TIDAC.   - start low correction scale qac and qhs  - will follow  - Patient will be discharged on basal/bolus and will f/u with Dr Ley
- her shortness of breath has resolved and she is laying flat  - would check O2 sat off supplemental oxygen  - continue aggressive fluid removal with HD  - If in fact she does have lymphangitic spread and her shortness of breath returns she may benefit from corticosteroids, however in the setting of her very brittle diabetes would NOT start this without proof of malignancy and with discussions with her endocrinologist

## 2019-01-24 NOTE — CONSULT NOTE ADULT - REASON FOR ADMISSION
DKA and Septic Shock

## 2019-01-24 NOTE — DIETITIAN INITIAL EVALUATION ADULT. - NS AS NUTRI INTERV ED CONTENT
1) Stressed the importance of protein intake to help prevent muscle/weight loss due to HD, provided recommendations to increase PO and protein intake, recommended small frequent meals and to start with protein to optimize intake. Offered nutritional supplementation available in hospital - pt refused at this time.

## 2019-01-24 NOTE — DIETITIAN INITIAL EVALUATION ADULT. - OTHER INFO
Nutrition consult received for HbA1c 8.6%. Confirmed information with RN. Pt reports good appetite and PO intake. Noted 100% PO intake as per flow sheets, however did not eat breakfast today, states not having an appetite at this time. As per RN, decreased appetite may be related to pt concerned of new lung mass found in CT. Pt refused nutritional supplementation. Denies difficulty chewing/swallowing. Pt denies nausea, vomiting, diarrhea, or constipation, reports last BM yesterday (01/23).

## 2019-01-24 NOTE — CONSULT NOTE ADULT - PROBLEM SELECTOR RECOMMENDATION 2
- BP at goal  - continue with current management
- R/O malignancy  - Patient does have a small right pleural effusion, mediastinal adenopathy (paratracheal and subcarinal), a possible right hilar mass vs adenopathy, and interlobular septal thickening on the right concerning for lymphangitic spread  - I discussed the diagnostic possibilities with Delphine this AM and have scheduled her for an EBUS/Bronchoscopy tomorrow at 8AM

## 2019-01-24 NOTE — DIETITIAN INITIAL EVALUATION ADULT. - ETIOLOGY
Limited readiness/interest in applying previous nutrition therapy education Increased physiological demand for nutrients in setting of chronic illness

## 2019-01-24 NOTE — CONSULT NOTE ADULT - PROBLEM SELECTOR RECOMMENDATION 7
- DVT proph  - GI proph  - Maintain O2 sat > 90%  - Incentive spirometer  - Plan for EBUS/bronchoscopy tomorrow

## 2019-01-25 VITALS
RESPIRATION RATE: 18 BRPM | TEMPERATURE: 98 F | OXYGEN SATURATION: 98 % | DIASTOLIC BLOOD PRESSURE: 70 MMHG | HEART RATE: 65 BPM | SYSTOLIC BLOOD PRESSURE: 137 MMHG

## 2019-01-25 DIAGNOSIS — I73.9 PERIPHERAL VASCULAR DISEASE, UNSPECIFIED: ICD-10-CM

## 2019-01-25 LAB
ANION GAP SERPL CALC-SCNC: 12 MMOL/L — SIGNIFICANT CHANGE UP (ref 5–17)
BUN SERPL-MCNC: 11 MG/DL — SIGNIFICANT CHANGE UP (ref 7–23)
CALCIUM SERPL-MCNC: 7.9 MG/DL — LOW (ref 8.4–10.5)
CHLORIDE SERPL-SCNC: 95 MMOL/L — LOW (ref 96–108)
CO2 SERPL-SCNC: 27 MMOL/L — SIGNIFICANT CHANGE UP (ref 22–31)
CREAT SERPL-MCNC: 3.23 MG/DL — HIGH (ref 0.5–1.3)
GLUCOSE BLDC GLUCOMTR-MCNC: 125 MG/DL — HIGH (ref 70–99)
GLUCOSE BLDC GLUCOMTR-MCNC: 166 MG/DL — HIGH (ref 70–99)
GLUCOSE BLDC GLUCOMTR-MCNC: 219 MG/DL — HIGH (ref 70–99)
GLUCOSE SERPL-MCNC: 232 MG/DL — HIGH (ref 70–99)
HCT VFR BLD CALC: 31.8 % — LOW (ref 34.5–45)
HGB BLD-MCNC: 10.1 G/DL — LOW (ref 11.5–15.5)
INR BLD: 1.03 RATIO — SIGNIFICANT CHANGE UP (ref 0.88–1.16)
MCHC RBC-ENTMCNC: 31.8 GM/DL — LOW (ref 32–36)
MCHC RBC-ENTMCNC: 34 PG — SIGNIFICANT CHANGE UP (ref 27–34)
MCV RBC AUTO: 107.1 FL — HIGH (ref 80–100)
PLATELET # BLD AUTO: 190 K/UL — SIGNIFICANT CHANGE UP (ref 150–400)
POTASSIUM SERPL-MCNC: 3.8 MMOL/L — SIGNIFICANT CHANGE UP (ref 3.5–5.3)
POTASSIUM SERPL-SCNC: 3.8 MMOL/L — SIGNIFICANT CHANGE UP (ref 3.5–5.3)
PROTHROM AB SERPL-ACNC: 11.8 SEC — SIGNIFICANT CHANGE UP (ref 10–13.1)
RBC # BLD: 2.97 M/UL — LOW (ref 3.8–5.2)
RBC # FLD: 15.6 % — HIGH (ref 10.3–14.5)
SODIUM SERPL-SCNC: 134 MMOL/L — LOW (ref 135–145)
WBC # BLD: 4.47 K/UL — SIGNIFICANT CHANGE UP (ref 3.8–10.5)
WBC # FLD AUTO: 4.47 K/UL — SIGNIFICANT CHANGE UP (ref 3.8–10.5)

## 2019-01-25 PROCEDURE — 82435 ASSAY OF BLOOD CHLORIDE: CPT

## 2019-01-25 PROCEDURE — 86900 BLOOD TYPING SEROLOGIC ABO: CPT

## 2019-01-25 PROCEDURE — 71250 CT THORAX DX C-: CPT

## 2019-01-25 PROCEDURE — 85610 PROTHROMBIN TIME: CPT

## 2019-01-25 PROCEDURE — 82550 ASSAY OF CK (CPK): CPT

## 2019-01-25 PROCEDURE — 83735 ASSAY OF MAGNESIUM: CPT

## 2019-01-25 PROCEDURE — 87040 BLOOD CULTURE FOR BACTERIA: CPT

## 2019-01-25 PROCEDURE — 82010 KETONE BODYS QUAN: CPT

## 2019-01-25 PROCEDURE — 85014 HEMATOCRIT: CPT

## 2019-01-25 PROCEDURE — 99261: CPT

## 2019-01-25 PROCEDURE — 84484 ASSAY OF TROPONIN QUANT: CPT

## 2019-01-25 PROCEDURE — 87633 RESP VIRUS 12-25 TARGETS: CPT

## 2019-01-25 PROCEDURE — 84132 ASSAY OF SERUM POTASSIUM: CPT

## 2019-01-25 PROCEDURE — 87486 CHLMYD PNEUM DNA AMP PROBE: CPT

## 2019-01-25 PROCEDURE — 71045 X-RAY EXAM CHEST 1 VIEW: CPT

## 2019-01-25 PROCEDURE — P9045: CPT

## 2019-01-25 PROCEDURE — 96361 HYDRATE IV INFUSION ADD-ON: CPT

## 2019-01-25 PROCEDURE — 87798 DETECT AGENT NOS DNA AMP: CPT

## 2019-01-25 PROCEDURE — 83690 ASSAY OF LIPASE: CPT

## 2019-01-25 PROCEDURE — 80053 COMPREHEN METABOLIC PANEL: CPT

## 2019-01-25 PROCEDURE — 85045 AUTOMATED RETICULOCYTE COUNT: CPT

## 2019-01-25 PROCEDURE — 80048 BASIC METABOLIC PNL TOTAL CA: CPT

## 2019-01-25 PROCEDURE — 99232 SBSQ HOSP IP/OBS MODERATE 35: CPT

## 2019-01-25 PROCEDURE — 85730 THROMBOPLASTIN TIME PARTIAL: CPT

## 2019-01-25 PROCEDURE — 82947 ASSAY GLUCOSE BLOOD QUANT: CPT

## 2019-01-25 PROCEDURE — 96375 TX/PRO/DX INJ NEW DRUG ADDON: CPT | Mod: XU

## 2019-01-25 PROCEDURE — 99284 EMERGENCY DEPT VISIT MOD MDM: CPT | Mod: 25

## 2019-01-25 PROCEDURE — 86850 RBC ANTIBODY SCREEN: CPT

## 2019-01-25 PROCEDURE — 84145 PROCALCITONIN (PCT): CPT

## 2019-01-25 PROCEDURE — 83880 ASSAY OF NATRIURETIC PEPTIDE: CPT

## 2019-01-25 PROCEDURE — 99291 CRITICAL CARE FIRST HOUR: CPT | Mod: 25

## 2019-01-25 PROCEDURE — C1751: CPT

## 2019-01-25 PROCEDURE — 86901 BLOOD TYPING SEROLOGIC RH(D): CPT

## 2019-01-25 PROCEDURE — 87581 M.PNEUMON DNA AMP PROBE: CPT

## 2019-01-25 PROCEDURE — 93005 ELECTROCARDIOGRAM TRACING: CPT | Mod: XU

## 2019-01-25 PROCEDURE — 36556 INSERT NON-TUNNEL CV CATH: CPT

## 2019-01-25 PROCEDURE — 99233 SBSQ HOSP IP/OBS HIGH 50: CPT

## 2019-01-25 PROCEDURE — 84100 ASSAY OF PHOSPHORUS: CPT

## 2019-01-25 PROCEDURE — 96374 THER/PROPH/DIAG INJ IV PUSH: CPT | Mod: XU

## 2019-01-25 PROCEDURE — 82553 CREATINE MB FRACTION: CPT

## 2019-01-25 PROCEDURE — 83605 ASSAY OF LACTIC ACID: CPT

## 2019-01-25 PROCEDURE — 82330 ASSAY OF CALCIUM: CPT

## 2019-01-25 PROCEDURE — 36555 INSERT NON-TUNNEL CV CATH: CPT

## 2019-01-25 PROCEDURE — 85027 COMPLETE CBC AUTOMATED: CPT

## 2019-01-25 PROCEDURE — 82803 BLOOD GASES ANY COMBINATION: CPT

## 2019-01-25 PROCEDURE — 83615 LACTATE (LD) (LDH) ENZYME: CPT

## 2019-01-25 PROCEDURE — 83010 ASSAY OF HAPTOGLOBIN QUANT: CPT

## 2019-01-25 PROCEDURE — 82962 GLUCOSE BLOOD TEST: CPT

## 2019-01-25 PROCEDURE — 84295 ASSAY OF SERUM SODIUM: CPT

## 2019-01-25 RX ORDER — INSULIN LISPRO 100/ML
2 VIAL (ML) SUBCUTANEOUS
Qty: 1 | Refills: 0 | OUTPATIENT
Start: 2019-01-25

## 2019-01-25 RX ORDER — INSULIN GLARGINE 100 [IU]/ML
9 INJECTION, SOLUTION SUBCUTANEOUS AT BEDTIME
Qty: 0 | Refills: 0 | Status: DISCONTINUED | OUTPATIENT
Start: 2019-01-25 | End: 2019-01-25

## 2019-01-25 RX ORDER — CLOPIDOGREL BISULFATE 75 MG/1
1 TABLET, FILM COATED ORAL
Qty: 0 | Refills: 0 | COMMUNITY

## 2019-01-25 RX ORDER — INSULIN LISPRO 100/ML
5 VIAL (ML) SUBCUTANEOUS
Qty: 0 | Refills: 0 | COMMUNITY
Start: 2019-01-25

## 2019-01-25 RX ADMIN — Medication 25 MILLIGRAM(S): at 05:12

## 2019-01-25 RX ADMIN — CINACALCET 60 MILLIGRAM(S): 30 TABLET, FILM COATED ORAL at 12:34

## 2019-01-25 RX ADMIN — Medication 1: at 12:32

## 2019-01-25 RX ADMIN — Medication 50 MILLIGRAM(S): at 05:12

## 2019-01-25 RX ADMIN — DULOXETINE HYDROCHLORIDE 60 MILLIGRAM(S): 30 CAPSULE, DELAYED RELEASE ORAL at 12:35

## 2019-01-25 RX ADMIN — OXYCODONE AND ACETAMINOPHEN 1 TABLET(S): 5; 325 TABLET ORAL at 05:50

## 2019-01-25 RX ADMIN — Medication 25 MILLIGRAM(S): at 15:11

## 2019-01-25 RX ADMIN — Medication 2 UNIT(S): at 12:32

## 2019-01-25 RX ADMIN — Medication 81 MILLIGRAM(S): at 12:34

## 2019-01-25 RX ADMIN — OXYCODONE AND ACETAMINOPHEN 1 TABLET(S): 5; 325 TABLET ORAL at 05:16

## 2019-01-25 RX ADMIN — CHLORHEXIDINE GLUCONATE 1 APPLICATION(S): 213 SOLUTION TOPICAL at 12:32

## 2019-01-25 RX ADMIN — Medication 2 UNIT(S): at 08:35

## 2019-01-25 RX ADMIN — Medication 1 TABLET(S): at 12:34

## 2019-01-25 RX ADMIN — SEVELAMER CARBONATE 800 MILLIGRAM(S): 2400 POWDER, FOR SUSPENSION ORAL at 05:12

## 2019-01-25 RX ADMIN — Medication 2: at 08:35

## 2019-01-25 RX ADMIN — SEVELAMER CARBONATE 800 MILLIGRAM(S): 2400 POWDER, FOR SUSPENSION ORAL at 15:11

## 2019-01-25 NOTE — PROGRESS NOTE ADULT - ASSESSMENT
63 f with  DKA- resolving. Endocrine follow.  ESRD- continue HD. Nephrology follow.  Observe off antibiotics  CAD stable.  Abnormal CT lung- Pulmonary evaluation Dr. Tate/ Dr. Russell. Unable to have bronchoscopy now as patient is on Plavix. Will have procedure done as OTP. Plavix on hold as per Pulmonary.  Anxiety control  DC home. Follow with PMD/ Nephrology/ Cardiology/ Pulmonary in 3-4 days.  Pierce Viera MD pager [phone]

## 2019-01-25 NOTE — PROGRESS NOTE ADULT - SUBJECTIVE AND OBJECTIVE BOX
Patient is a 63y old  Female who presents with a chief complaint of DKA and Septic Shock (25 Jan 2019 13:53)      SUBJECTIVE / OVERNIGHT EVENTS: Comfortable without new complaints.   Review of Systems  chest pain no  palpitations no  sob no  nausea no  headache no    MEDICATIONS  (STANDING):  aspirin enteric coated 81 milliGRAM(s) Oral daily  atorvastatin 20 milliGRAM(s) Oral at bedtime  chlorhexidine 4% Liquid 1 Application(s) Topical <User Schedule>  cinacalcet 60 milliGRAM(s) Oral daily  dextrose 5%. 1000 milliLiter(s) (50 mL/Hr) IV Continuous <Continuous>  dextrose 50% Injectable 12.5 Gram(s) IV Push once  dextrose 50% Injectable 25 Gram(s) IV Push once  dextrose 50% Injectable 25 Gram(s) IV Push once  DULoxetine 60 milliGRAM(s) Oral daily  heparin  Injectable 5000 Unit(s) SubCutaneous every 8 hours  hydrALAZINE 25 milliGRAM(s) Oral every 8 hours  insulin glargine Injectable (LANTUS) 9 Unit(s) SubCutaneous at bedtime  insulin lispro (HumaLOG) corrective regimen sliding scale   SubCutaneous at bedtime  insulin lispro (HumaLOG) corrective regimen sliding scale   SubCutaneous three times a day before meals  insulin lispro Injectable (HumaLOG) 2 Unit(s) SubCutaneous three times a day before meals  metoprolol succinate ER 50 milliGRAM(s) Oral daily  multivitamin 1 Tablet(s) Oral daily  senna 2 Tablet(s) Oral at bedtime  sevelamer hydrochloride 800 milliGRAM(s) Oral three times a day    MEDICATIONS  (PRN):  dextrose 40% Gel 15 Gram(s) Oral once PRN Blood Glucose LESS THAN 70 milliGRAM(s)/deciliter  glucagon  Injectable 1 milliGRAM(s) IntraMuscular once PRN Glucose LESS THAN 70 milligrams/deciliter  oxyCODONE    5 mG/acetaminophen 325 mG 1 Tablet(s) Oral every 4 hours PRN Moderate Pain (4 - 6)      Vital Signs Last 24 Hrs  T(C): 36.8 (25 Jan 2019 14:04), Max: 36.8 (25 Jan 2019 14:04)  T(F): 98.3 (25 Jan 2019 14:04), Max: 98.3 (25 Jan 2019 14:04)  HR: 65 (25 Jan 2019 14:04) (65 - 76)  BP: 137/70 (25 Jan 2019 14:04) (137/70 - 170/74)  BP(mean): --  RR: 18 (25 Jan 2019 14:04) (18 - 18)  SpO2: 98% (25 Jan 2019 14:04) (96% - 100%)    PHYSICAL EXAM:  GENERAL: NAD  HEAD:  Atraumatic, Normocephalic  EYES: EOMI, PERRLA, conjunctiva and sclera clear  NECK: Supple, No JVD  CHEST/LUNG: Clear to auscultation bilaterally; No wheeze  HEART: Regular rate and rhythm; No murmurs, rubs, or gallops  ABDOMEN: Soft, Nontender, Nondistended; Bowel sounds present  EXTREMITIES:  2+ Peripheral Pulses, No clubbing, cyanosis, or edema  PSYCH: AAOx3  NEUROLOGY: non-focal  SKIN: No rashes or lesions    LABS:                        10.1   4.47  )-----------( 190      ( 25 Jan 2019 07:49 )             31.8     01-25    134<L>  |  95<L>  |  11  ----------------------------<  232<H>  3.8   |  27  |  3.23<H>    Ca    7.9<L>      25 Jan 2019 06:42      PT/INR - ( 25 Jan 2019 07:37 )   PT: 11.8 sec;   INR: 1.03 ratio                     RADIOLOGY & ADDITIONAL TESTS:    Imaging Personally Reviewed:    Consultant(s) Notes Reviewed:      Care Discussed with Consultants/Other Providers:

## 2019-01-25 NOTE — PROGRESS NOTE ADULT - SUBJECTIVE AND OBJECTIVE BOX
Diabetes Follow up note: Saw pt earlier today  Interval Hx: 63 year old female w/uncontrolled T1DM all known microvascular complications, CAD, CHF (EF 50% 10/2018), TIA, PVD, ESRD HD. Here with hyperglycemia /DKA due to missing insulin (pt states she ran out of insulin and called endo but didn't get Rx) Also found to have fluid overload and questionable sepsis now found to have R hilar mass. Plan for bronchoscopy today but deferred to next week because pt is on Plavix and needs to be off med prior to procedure. Pt reports feeling tired and not eating consistently. BG yesterday 70s to 100s. However, pt states she ate pizza last night around 10pm  with BG values rebounding to 200s this am.     Review of Systems:  General: "I'm tired"    GI: Tolerating POs without any N/V/D/ABD PAIN.  CV: No CP/SOB  ENDO: No S&Sx of hypoglycemia      MEDS:  atorvastatin 20 milliGRAM(s) Oral at bedtime  cinacalcet 60 milliGRAM(s) Oral daily  insulin glargine Injectable (LANTUS) 8 Unit(s) SubCutaneous at bedtime  insulin lispro (HumaLOG) corrective regimen sliding scale   SubCutaneous at bedtime  insulin lispro (HumaLOG) corrective regimen sliding scale   SubCutaneous three times a day before meals  insulin lispro Injectable (HumaLOG) 2 Unit(s) SubCutaneous three times a day before meals    Allergies    No Known Allergies        PE:  General: Female lying in bed in NAD.   Vital Signs Last 24 Hrs  T(C): 36.7 (01-25-19 @ 04:59), Max: 36.7 (01-24-19 @ 22:28)  T(F): 98 (01-25-19 @ 04:59), Max: 98.1 (01-24-19 @ 22:28)  HR: 74 (01-25-19 @ 04:59) (70 - 76)  BP: 165/79 (01-25-19 @ 04:59) (144/73 - 170/74)  BP(mean): --  RR: 18 (01-25-19 @ 04:59) (18 - 18)  SpO2: 96% (01-25-19 @ 10:00) (96% - 100%)  Abd: Soft, NT, ND,   Extremities: Warm. no edema noted in all 4 exts  Neuro: A&O X3    LABS:  POCT Blood Glucose.: 166 mg/dL (01-25-19 @ 11:50)  POCT Blood Glucose.: 219 mg/dL (01-25-19 @ 07:42)  POCT Blood Glucose.: 137 mg/dL (01-24-19 @ 22:30)  POCT Blood Glucose.: 78 mg/dL (01-24-19 @ 17:09)  POCT Blood Glucose.: 72 mg/dL (01-24-19 @ 11:56)  POCT Blood Glucose.: 118 mg/dL (01-24-19 @ 07:53)  POCT Blood Glucose.: 175 mg/dL (01-23-19 @ 22:01)  POCT Blood Glucose.: 208 mg/dL (01-23-19 @ 16:46)                          10.1   4.47  )-----------( 190      ( 25 Jan 2019 07:49 )             31.8     01-25    134<L>  |  95<L>  |  11  ----------------------------<  232<H>  3.8   |  27  |  3.23<H>    Ca    7.9<L>      25 Jan 2019 06:42      Hemoglobin A1C, Whole Blood: 8.6 % <H> [4.0 - 5.6] (11-16-18 @ 20:14)

## 2019-01-25 NOTE — PROGRESS NOTE ADULT - PROBLEM SELECTOR PLAN 1
-test BG AC/HS  -Change Lantus to 9  units for tonight   -C/w Humalog 2 units AC meals for now w/decreased PO intake  -c/w Humalog low correction scale AC and Low HS scale  When cleared for discharge:  -Please write Rxs for: Solo Star Insulin pen (1 box)/Humalog Kwik insulin pen(1 box)/Fany insulin pen needles(1 box).   -pt to f/u with pvt endocrinologist Dr Ley  -Consider home care visit to make sure pt has all DM supplies at home and make sure she is taking insulin as ordered.   -Plan discussed with pt/team.  Contact info: [phone] (24/7). pager [phone]

## 2019-01-25 NOTE — PROGRESS NOTE ADULT - SUBJECTIVE AND OBJECTIVE BOX
[facility] DIVISION OF PULMONARY, CRITICAL CARE and SLEEP MEDICINE  PULMONARY PROGRESS NOTE    Evaluation on behalf of Dr. Braden Thompson    PATIENT INFORMATION:  NAME: NATHAN MEEKS:  MRN: [id number]    CHIEF COMPLAINT: Patient is a 63y old  Female who presents with a chief complaint of DKA and Septic Shock (25 Jan 2019 08:17)      [x] INITIAL CONSULT, H&P, FAMILY HISTORY and PAST MEDICAL AND SURGICAL HISTORY REVIEWED    OVERNIGHT EVENTS or CHANGES TO HPI: Bronchoscopy deferred as patient was on Plavix - will hold Plavix and plan for EBUS next week - to be arranged by Dr. Thompson's office     ========================REVIEW OF SYSTEMS========================  CONSTITUTIONAL: No complaints   CARDIOVASCULAR: Denies chest pain   PULMONARY: Denies cough, SOB, or hemoptysis  [x] REMAINING REVIEW OF SYSTEMS NEGATIVE  [] UNABLE TO OBTAIN REVIEW OF SYSTEMS DUE TO _______________    ========================MEDICATIONS=============================  MEDICATIONS  (STANDING):  aspirin enteric coated 81 milliGRAM(s) Oral daily  atorvastatin 20 milliGRAM(s) Oral at bedtime  chlorhexidine 4% Liquid 1 Application(s) Topical <User Schedule>  cinacalcet 60 milliGRAM(s) Oral daily  dextrose 5%. 1000 milliLiter(s) (50 mL/Hr) IV Continuous <Continuous>  dextrose 50% Injectable 12.5 Gram(s) IV Push once  dextrose 50% Injectable 25 Gram(s) IV Push once  dextrose 50% Injectable 25 Gram(s) IV Push once  DULoxetine 60 milliGRAM(s) Oral daily  heparin  Injectable 5000 Unit(s) SubCutaneous every 8 hours  hydrALAZINE 25 milliGRAM(s) Oral every 8 hours  insulin glargine Injectable (LANTUS) 8 Unit(s) SubCutaneous at bedtime  insulin lispro (HumaLOG) corrective regimen sliding scale   SubCutaneous at bedtime  insulin lispro (HumaLOG) corrective regimen sliding scale   SubCutaneous three times a day before meals  insulin lispro Injectable (HumaLOG) 2 Unit(s) SubCutaneous three times a day before meals  metoprolol succinate ER 50 milliGRAM(s) Oral daily  multivitamin 1 Tablet(s) Oral daily  senna 2 Tablet(s) Oral at bedtime  sevelamer hydrochloride 800 milliGRAM(s) Oral three times a day      MEDICATIONS  (PRN):  dextrose 40% Gel 15 Gram(s) Oral once PRN Blood Glucose LESS THAN 70 milliGRAM(s)/deciliter  glucagon  Injectable 1 milliGRAM(s) IntraMuscular once PRN Glucose LESS THAN 70 milligrams/deciliter  oxyCODONE    5 mG/acetaminophen 325 mG 1 Tablet(s) Oral every 4 hours PRN Moderate Pain (4 - 6)      ========================PHYSICAL EXAM============================    VITALS: ICU Vital Signs Last 24 Hrs  T(C): 36.7 (25 Jan 2019 04:59), Max: 36.7 (24 Jan 2019 11:36)  T(F): 98 (25 Jan 2019 04:59), Max: 98.1 (24 Jan 2019 22:28)  HR: 74 (25 Jan 2019 04:59) (65 - 76)  BP: 165/79 (25 Jan 2019 04:59) (137/71 - 170/74)  RR: 18 (25 Jan 2019 04:59) (18 - 18)  SpO2: 96% (25 Jan 2019 10:00) (96% - 100%)      INTAKE and OUTPUT: I&O's Summary    24 Jan 2019 07:01  -  25 Jan 2019 07:00  --------------------------------------------------------  IN: 1140 mL / OUT: 3400 mL / NET: -2260 mL        VENTILATOR SETTINGS: N/A     GENERAL: AAOx3, NAD, comfortable  EYES: anicteric, EOMI   EAR/NOSE/MOUTH/THROAT: NCAT, MMM, nares clear, trachea midline    NECK: supple, no JVD   LYMPH NODES: no palpable supraclavicular LAD   CARDIOVASCULAR: RRR, S1S2   RESPIRATORY: good air entry, no wheeze or rhonchi  ABDOMEN: soft, NT, ND, +BS   EXTREMITIES: no clubbing or cyanosis   SKIN: warm and dry   MUSCULOSKELETAL: strength intact   NEUROLOGIC: nonfocal exam  PSYCHIATRIC: moves all extremities    ========================LABORATORY RESULTS AND IMAGING=============                        10.1   4.47  )-----------( 190      ( 25 Jan 2019 07:49 )             31.8                                                    01-25    134<L>  |  95<L>  |  11  ----------------------------<  232<H>  3.8   |  27  |  3.23<H>    Ca    7.9<L>      25 Jan 2019 06:42      Creatinine Trend: 3.23<--, 3.29<--, 3.84<--, 5.65<--, 5.22<--, 4.89<--    CT CHEST:     [x] RADIOLOGY REVIEWED AND INTERPRETED BY ME      THANK YOU FOR ALLOWING US TO PARTICIPATE IN THE CARE OF THIS PATIENT

## 2019-01-25 NOTE — PROGRESS NOTE ADULT - PROBLEM SELECTOR PROBLEM 1
Diabetic ketoacidosis with coma associated with type 1 diabetes mellitus
Diabetic ketoacidosis with coma associated with type 1 diabetes mellitus
Type 1 diabetes mellitus with chronic kidney disease on chronic dialysis
Type 1 diabetes mellitus with chronic kidney disease on chronic dialysis
Type 1 diabetes mellitus with ketoacidosis without coma
R/O Malignant neoplasm of right lung, unspecified part of lung

## 2019-01-25 NOTE — PROGRESS NOTE ADULT - PROBLEM SELECTOR PLAN 1
- R/O malignancy  - Patient does have a small right pleural effusion, mediastinal adenopathy (paratracheal and subcarinal), a possible right hilar mass vs adenopathy, and interlobular septal thickening on the right concerning for lymphangitic spread  - If in fact she does have lymphangitic spread and her shortness of breath returns she may benefit from corticosteroids, however in the setting of her very brittle diabetes would NOT start this without proof of malignancy and with discussions with her endocrinologist

## 2019-01-25 NOTE — PROGRESS NOTE ADULT - PROBLEM SELECTOR PLAN 7
- DVT proph  - GI proph  - Maintain O2 sat > 90%  - Incentive spirometer  - Plan for EBUS/bronchoscopy next week once off Plavix 5-7 days

## 2019-01-25 NOTE — PROGRESS NOTE ADULT - SUBJECTIVE AND OBJECTIVE BOX
Cardiovascular Disease Progress Note    Overnight events: No acute events overnight. plan for bronchoscopy possibly today. no new cardiac sx   Otherwise review of systems negative    Objective Findings:  T(C): 36.7 (19 @ 04:59), Max: 36.7 (19 @ 11:36)  HR: 74 (19 @ 04:59) (65 - 76)  BP: 165/79 (19 @ 04:59) (137/71 - 170/74)  RR: 18 (19 @ 04:59) (18 - 18)  SpO2: 100% (19 @ 04:59) (98% - 100%)  Wt(kg): --  Daily     Daily Weight in k (2019 06:09)      Physical Exam:  Gen: NAD  HEENT: EOMI  CV: RRR, normal S1 + S2, no m/r/g  Lungs: CTAB  Abd: soft, non-tender  Ext: No edema      Laboratory Data:                        10.1   4.47  )-----------( 190      ( 2019 07:49 )             31.8         134<L>  |  95<L>  |  11  ----------------------------<  232<H>  3.8   |  27  |  3.23<H>    Ca    7.9<L>      2019 06:42      PT/INR - ( 2019 07:37 )   PT: 11.8 sec;   INR: 1.03 ratio                   Inpatient Medications:  MEDICATIONS  (STANDING):  aspirin enteric coated 81 milliGRAM(s) Oral daily  atorvastatin 20 milliGRAM(s) Oral at bedtime  chlorhexidine 4% Liquid 1 Application(s) Topical <User Schedule>  cinacalcet 60 milliGRAM(s) Oral daily  dextrose 5%. 1000 milliLiter(s) (50 mL/Hr) IV Continuous <Continuous>  dextrose 50% Injectable 12.5 Gram(s) IV Push once  dextrose 50% Injectable 25 Gram(s) IV Push once  dextrose 50% Injectable 25 Gram(s) IV Push once  DULoxetine 60 milliGRAM(s) Oral daily  heparin  Injectable 5000 Unit(s) SubCutaneous every 8 hours  hydrALAZINE 25 milliGRAM(s) Oral every 8 hours  insulin glargine Injectable (LANTUS) 8 Unit(s) SubCutaneous at bedtime  insulin lispro (HumaLOG) corrective regimen sliding scale   SubCutaneous at bedtime  insulin lispro (HumaLOG) corrective regimen sliding scale   SubCutaneous three times a day before meals  insulin lispro Injectable (HumaLOG) 2 Unit(s) SubCutaneous three times a day before meals  metoprolol succinate ER 50 milliGRAM(s) Oral daily  multivitamin 1 Tablet(s) Oral daily  senna 2 Tablet(s) Oral at bedtime  sevelamer hydrochloride 800 milliGRAM(s) Oral three times a day      Assessment:  - DKA in the setting of medication non-compliance and possible sepsis  -hypotension, shock -> likely vasodilatory  -ischemic cardiomyopathy, cad s/p multiple stents  -pulmonary edema  -esrd on hd  -PAD s/p prior intervention   -remote TIA  -preop clearance        Recs:  -DKA resolved. f/u endo recs  -no true ischemic sx. ekg with nonspecific ST changes. would defer ischemic work up for now unless  -hx of prolonged qtc. avoid qtc prolonging meds  -CT chest findings concernign for malignancy. follows with dr drake and dr michael. would notify them of findings and consult them for recommendations   -c/w asa, plavix, statin for ischemic cardiomyopathy/CAD/PAD if not contraindicated  -would resume bb and hydralazine for ischemic cardiomyopathy and HTN   -hd per renal. appears relatively euvolemic  -dvt ppx  -patient remains at elevated cardiovascular risk. she remains medically optimized without any ischemic or heart failure symptoms. can proceed to bronchoscopy under GA without further cardiac workup. c/w anti-platelet agents and beta blockers richardson-operatively as tolerates. last intervention 2018. can hold plavix as needed at this time      Over 25 minutes spent on total encounter; more than 50% of the visit was spent counseling and/or coordinating care by the attending physician.      Julián Biswas MD   Cardiovascular Disease  [phone]

## 2019-01-25 NOTE — PROGRESS NOTE ADULT - REASON FOR ADMISSION
DKA and Septic Shock

## 2019-01-25 NOTE — PROGRESS NOTE ADULT - ASSESSMENT
63 year old female w/uncontrolled T1DM multiple complications and comorbidities. Here with hyperglycemia /DKA due to running out of  insulin. Now remains inpatient for workup for possible lung mass and will need lung biopsy next week. BG levels variable depending on PO intake and nutritional indiscretions. BG values <100s yesterday and 100s to 200s today after having pizza late last night. Will adjust Lantus dose up by one unit and continue low dose Humalog ac since pt is unable to keep consistent carb diet. BG goal (100-200mg/dl). No hypoglycemia

## 2019-01-25 NOTE — PROGRESS NOTE ADULT - ATTENDING COMMENTS
1. DKA due to lack of insulin at home. Pt ran out of insulin. Thought she was supposed to get a pump. Anion gap has closed.  Once DKA corrected pt 's mental status back to baseline. No evidence of infection , sepsis or cardiac event. Stop antibiotics.  Transitioning pt to lantus. Turn off insulin drip in 2 hours.   2. ESRD: Dialysis 4x week.
1. DKA resolved.  2. ESRD. For dialysis today.
Evaluation on behalf of Dr. Braden Thompson.    Care of patient to be resumed by Dr. Thompson and colleagues. Please call their service for further questions starting this evening at [phone]    -Niko Villa MD

## 2019-01-25 NOTE — PROGRESS NOTE ADULT - ASSESSMENT
62 yo F with ESRD (on HD M/Tu/Th/Sa), type 1 DM, CAD, HFrEF (EF 50% on TTE from 10/18), TIA, and PVD, who presented from HD with complaints of N/V and hyperglycemia as well as shortness of breath. She was initially treated for DKA and has improved. She is now being evaluated by pulmonary for an abnormal CT chest concerning for possible underlying malignancy.

## 2019-01-25 NOTE — PROGRESS NOTE ADULT - PROVIDER SPECIALTY LIST ADULT
Cardiology
Endocrinology
Internal Medicine
MICU
MICU
Nephrology
Endocrinology
Pulmonology

## 2019-02-01 ENCOUNTER — LABORATORY RESULT (OUTPATIENT)
Age: 64
End: 2019-02-01

## 2019-02-01 ENCOUNTER — APPOINTMENT (OUTPATIENT)
Dept: PULMONOLOGY | Facility: CLINIC | Age: 64
End: 2019-02-01
Payer: MEDICARE

## 2019-02-01 VITALS
HEART RATE: 82 BPM | RESPIRATION RATE: 16 BRPM | SYSTOLIC BLOOD PRESSURE: 146 MMHG | TEMPERATURE: 98.4 F | OXYGEN SATURATION: 99 % | DIASTOLIC BLOOD PRESSURE: 74 MMHG

## 2019-02-01 DIAGNOSIS — J90 PLEURAL EFFUSION, NOT ELSEWHERE CLASSIFIED: ICD-10-CM

## 2019-02-01 DIAGNOSIS — E10.319 TYPE 1 DIABETES MELLITUS WITH UNSPECIFIED DIABETIC RETINOPATHY W/OUT MACULAR EDEMA: ICD-10-CM

## 2019-02-01 LAB
APTT BLD: 33.2 SEC
INR PPP: 1.1 RATIO
QUALITY CONTROL: YES

## 2019-02-01 PROCEDURE — 99496 TRANSJ CARE MGMT HIGH F2F 7D: CPT | Mod: 25

## 2019-02-01 PROCEDURE — 36415 COLL VENOUS BLD VENIPUNCTURE: CPT

## 2019-02-01 PROCEDURE — 85610 PROTHROMBIN TIME: CPT | Mod: QW

## 2019-02-01 PROCEDURE — 94010 BREATHING CAPACITY TEST: CPT

## 2019-02-01 PROCEDURE — 94250: CPT

## 2019-02-02 LAB
BASOPHILS # BLD AUTO: 0.02 K/UL
BASOPHILS NFR BLD AUTO: 0.2 %
EOSINOPHIL # BLD AUTO: 0.04 K/UL
EOSINOPHIL NFR BLD AUTO: 0.5 %
HCT VFR BLD CALC: 33.1 %
HGB BLD-MCNC: 10.1 G/DL
IMM GRANULOCYTES NFR BLD AUTO: 0.1 %
LYMPHOCYTES # BLD AUTO: 0.79 K/UL
LYMPHOCYTES NFR BLD AUTO: 9.8 %
MAN DIFF?: NORMAL
MCHC RBC-ENTMCNC: 30.5 GM/DL
MCHC RBC-ENTMCNC: 32.9 PG
MCV RBC AUTO: 107.8 FL
MONOCYTES # BLD AUTO: 0.79 K/UL
MONOCYTES NFR BLD AUTO: 9.8 %
NEUTROPHILS # BLD AUTO: 6.39 K/UL
NEUTROPHILS NFR BLD AUTO: 79.6 %
PLATELET # BLD AUTO: 236 K/UL
RBC # BLD: 3.07 M/UL
RBC # FLD: 15.8 %
WBC # FLD AUTO: 8.04 K/UL

## 2019-02-11 ENCOUNTER — INPATIENT (INPATIENT)
Facility: HOSPITAL | Age: 64
LOS: 9 days | Discharge: HOME | DRG: 177 | End: 2019-02-21
Attending: INTERNAL MEDICINE | Admitting: INTERNAL MEDICINE
Payer: MEDICARE

## 2019-02-11 VITALS
OXYGEN SATURATION: 100 % | SYSTOLIC BLOOD PRESSURE: 152 MMHG | RESPIRATION RATE: 16 BRPM | HEART RATE: 82 BPM | HEIGHT: 64 IN | WEIGHT: 117.95 LBS | DIASTOLIC BLOOD PRESSURE: 83 MMHG

## 2019-02-11 DIAGNOSIS — J18.1 LOBAR PNEUMONIA, UNSPECIFIED ORGANISM: ICD-10-CM

## 2019-02-11 DIAGNOSIS — Z98.89 OTHER SPECIFIED POSTPROCEDURAL STATES: Chronic | ICD-10-CM

## 2019-02-11 LAB
ALBUMIN SERPL ELPH-MCNC: 4.3 G/DL — SIGNIFICANT CHANGE UP (ref 3.3–5)
ALP SERPL-CCNC: 90 U/L — SIGNIFICANT CHANGE UP (ref 40–120)
ALT FLD-CCNC: 39 U/L — SIGNIFICANT CHANGE UP (ref 10–45)
ANION GAP SERPL CALC-SCNC: 18 MMOL/L — HIGH (ref 5–17)
APTT BLD: 30.7 SEC — SIGNIFICANT CHANGE UP (ref 27.5–36.3)
AST SERPL-CCNC: 46 U/L — HIGH (ref 10–40)
BASOPHILS # BLD AUTO: 0.1 K/UL — SIGNIFICANT CHANGE UP (ref 0–0.2)
BASOPHILS NFR BLD AUTO: 0.5 % — SIGNIFICANT CHANGE UP (ref 0–2)
BILIRUB SERPL-MCNC: 0.4 MG/DL — SIGNIFICANT CHANGE UP (ref 0.2–1.2)
BUN SERPL-MCNC: 30 MG/DL — HIGH (ref 7–23)
CALCIUM SERPL-MCNC: 8.5 MG/DL — SIGNIFICANT CHANGE UP (ref 8.4–10.5)
CHLORIDE SERPL-SCNC: 90 MMOL/L — LOW (ref 96–108)
CO2 SERPL-SCNC: 27 MMOL/L — SIGNIFICANT CHANGE UP (ref 22–31)
CREAT SERPL-MCNC: 4.51 MG/DL — HIGH (ref 0.5–1.3)
EOSINOPHIL # BLD AUTO: 0.1 K/UL — SIGNIFICANT CHANGE UP (ref 0–0.5)
EOSINOPHIL NFR BLD AUTO: 1.2 % — SIGNIFICANT CHANGE UP (ref 0–6)
GAS PNL BLDV: SIGNIFICANT CHANGE UP
GAS PNL BLDV: SIGNIFICANT CHANGE UP
GLUCOSE SERPL-MCNC: 391 MG/DL — HIGH (ref 70–99)
HCT VFR BLD CALC: 32.3 % — LOW (ref 34.5–45)
HGB BLD-MCNC: 10.6 G/DL — LOW (ref 11.5–15.5)
INR BLD: 1.08 RATIO — SIGNIFICANT CHANGE UP (ref 0.88–1.16)
LYMPHOCYTES # BLD AUTO: 0.7 K/UL — LOW (ref 1–3.3)
LYMPHOCYTES # BLD AUTO: 7.4 % — LOW (ref 13–44)
MCHC RBC-ENTMCNC: 32.8 GM/DL — SIGNIFICANT CHANGE UP (ref 32–36)
MCHC RBC-ENTMCNC: 34 PG — SIGNIFICANT CHANGE UP (ref 27–34)
MCV RBC AUTO: 103 FL — HIGH (ref 80–100)
MONOCYTES # BLD AUTO: 0.6 K/UL — SIGNIFICANT CHANGE UP (ref 0–0.9)
MONOCYTES NFR BLD AUTO: 6.6 % — SIGNIFICANT CHANGE UP (ref 2–14)
NEUTROPHILS # BLD AUTO: 8.2 K/UL — HIGH (ref 1.8–7.4)
NEUTROPHILS NFR BLD AUTO: 84.2 % — HIGH (ref 43–77)
NT-PROBNP SERPL-SCNC: HIGH PG/ML (ref 0–300)
PLATELET # BLD AUTO: 268 K/UL — SIGNIFICANT CHANGE UP (ref 150–400)
POTASSIUM SERPL-MCNC: 6.7 MMOL/L — CRITICAL HIGH (ref 3.5–5.3)
POTASSIUM SERPL-SCNC: 6.7 MMOL/L — CRITICAL HIGH (ref 3.5–5.3)
PROT SERPL-MCNC: 7 G/DL — SIGNIFICANT CHANGE UP (ref 6–8.3)
PROTHROM AB SERPL-ACNC: 12.3 SEC — SIGNIFICANT CHANGE UP (ref 10–12.9)
RAPID RVP RESULT: SIGNIFICANT CHANGE UP
RBC # BLD: 3.12 M/UL — LOW (ref 3.8–5.2)
RBC # FLD: 14.7 % — HIGH (ref 10.3–14.5)
SODIUM SERPL-SCNC: 135 MMOL/L — SIGNIFICANT CHANGE UP (ref 135–145)
TROPONIN T, HIGH SENSITIVITY RESULT: 220 NG/L — HIGH (ref 0–51)
WBC # BLD: 9.8 K/UL — SIGNIFICANT CHANGE UP (ref 3.8–10.5)
WBC # FLD AUTO: 9.8 K/UL — SIGNIFICANT CHANGE UP (ref 3.8–10.5)

## 2019-02-11 PROCEDURE — 99291 CRITICAL CARE FIRST HOUR: CPT | Mod: GC

## 2019-02-11 PROCEDURE — 71275 CT ANGIOGRAPHY CHEST: CPT | Mod: 26

## 2019-02-11 PROCEDURE — 93010 ELECTROCARDIOGRAM REPORT: CPT | Mod: GC

## 2019-02-11 PROCEDURE — 71045 X-RAY EXAM CHEST 1 VIEW: CPT | Mod: 26

## 2019-02-11 RX ORDER — PIPERACILLIN AND TAZOBACTAM 4; .5 G/20ML; G/20ML
3.38 INJECTION, POWDER, LYOPHILIZED, FOR SOLUTION INTRAVENOUS ONCE
Qty: 0 | Refills: 0 | Status: COMPLETED | OUTPATIENT
Start: 2019-02-11 | End: 2019-02-11

## 2019-02-11 RX ORDER — INSULIN HUMAN 100 [IU]/ML
10 INJECTION, SOLUTION SUBCUTANEOUS ONCE
Qty: 0 | Refills: 0 | Status: COMPLETED | OUTPATIENT
Start: 2019-02-11 | End: 2019-02-11

## 2019-02-11 RX ORDER — DEXTROSE 50 % IN WATER 50 %
50 SYRINGE (ML) INTRAVENOUS ONCE
Qty: 0 | Refills: 0 | Status: DISCONTINUED | OUTPATIENT
Start: 2019-02-11 | End: 2019-02-11

## 2019-02-11 RX ORDER — DEXTROSE 50 % IN WATER 50 %
25 SYRINGE (ML) INTRAVENOUS ONCE
Qty: 0 | Refills: 0 | Status: COMPLETED | OUTPATIENT
Start: 2019-02-11 | End: 2019-02-11

## 2019-02-11 RX ORDER — SODIUM BICARBONATE 1 MEQ/ML
50 SYRINGE (ML) INTRAVENOUS ONCE
Qty: 0 | Refills: 0 | Status: COMPLETED | OUTPATIENT
Start: 2019-02-11 | End: 2019-02-11

## 2019-02-11 RX ORDER — CALCIUM GLUCONATE 100 MG/ML
2 VIAL (ML) INTRAVENOUS ONCE
Qty: 0 | Refills: 0 | Status: COMPLETED | OUTPATIENT
Start: 2019-02-11 | End: 2019-02-11

## 2019-02-11 RX ORDER — ALBUTEROL 90 UG/1
10 AEROSOL, METERED ORAL ONCE
Qty: 0 | Refills: 0 | Status: COMPLETED | OUTPATIENT
Start: 2019-02-11 | End: 2019-02-11

## 2019-02-11 RX ORDER — VANCOMYCIN HCL 1 G
1000 VIAL (EA) INTRAVENOUS ONCE
Qty: 0 | Refills: 0 | Status: DISCONTINUED | OUTPATIENT
Start: 2019-02-11 | End: 2019-02-12

## 2019-02-11 RX ORDER — AZITHROMYCIN 500 MG/1
500 TABLET, FILM COATED ORAL ONCE
Qty: 0 | Refills: 0 | Status: COMPLETED | OUTPATIENT
Start: 2019-02-11 | End: 2019-02-11

## 2019-02-11 RX ADMIN — Medication 50 MILLIEQUIVALENT(S): at 21:37

## 2019-02-11 RX ADMIN — PIPERACILLIN AND TAZOBACTAM 200 GRAM(S): 4; .5 INJECTION, POWDER, LYOPHILIZED, FOR SOLUTION INTRAVENOUS at 23:05

## 2019-02-11 RX ADMIN — ALBUTEROL 10 MILLIGRAM(S): 90 AEROSOL, METERED ORAL at 21:36

## 2019-02-11 RX ADMIN — INSULIN HUMAN 10 UNIT(S): 100 INJECTION, SOLUTION SUBCUTANEOUS at 21:37

## 2019-02-11 RX ADMIN — Medication 200 GRAM(S): at 21:37

## 2019-02-11 RX ADMIN — Medication 25 MILLILITER(S): at 21:37

## 2019-02-11 RX ADMIN — AZITHROMYCIN 250 MILLIGRAM(S): 500 TABLET, FILM COATED ORAL at 23:53

## 2019-02-11 NOTE — ED PROVIDER NOTE - CRITICAL CARE PROVIDED
documentation/interpretation of diagnostic studies/consult w/ pt's family directly relating to pts condition/additional history taking/direct patient care (not related to procedure)

## 2019-02-11 NOTE — ED PROVIDER NOTE - OBJECTIVE STATEMENT
PGY1/MD Doretha. 64 yo F with ESRD (on HD M/Tu/Th/Sa), type 1 DM, CAD, HFrEF (EF 50% on TTE from 10/18), TIA, and PVD, recently identified lung tumor (susp malignancy, on pulmo f/u), with PSH of cholecystectomy who presented today for SOB, sudden onset at home after she came back from HD. pt has had cough, with yellowish sputum production, no blood. Denies fever or n/v/d. Has had b/l leg swelling with pain, no h/o DVT. PMD=Manuel Castro ([phone])

## 2019-02-11 NOTE — ED PROVIDER NOTE - PHYSICAL EXAMINATION
PGY1/MD Doretha.   VITALS: reviewed  GEN: No apparent distress, A & O x 4  HEAD/EYES: NC/AT, anicteric sclerae, no conjunctival pallor  ENT: mucus membranes moist, oropharynx WNL,  neck is supple  RESP: +left lung crackle, no wheeze, chest wall nontender and atraumatic  CV: heart with reg rhythm S1, S2, no murmur  ABDOMEN: normoactive bowel sounds, soft, nondistended, nontender, +right upper abd scar  MSK: extremities atraumatic and nontender, no edema, no asymmetry.   SKIN: warm, dry, no rash, no bruising, no cyanosis. color appropriate for ethnicity  NEURO: alert, mentating appropriately, no facial asymmetry.  PSYCH: Affect appropriate

## 2019-02-11 NOTE — ED PROVIDER NOTE - NS ED ROS FT
PGY1/MD Doretha.   CONST: no fevers, no chills, no trauma  EYES: no pain, no visual disturbances  ENT: no sore throat, no epistaxis, no rhinorrhea, no hearing changes  CV: no chest pain, no palpitations, no orthopnea, no extremity pain or swelling  RESP: +shortness of breath, +cough, +sputum, no pleurisy, no wheezing  ABD: no abdominal pain, no nausea, no vomiting, no diarrhea, no black or bloody stool  : no dysuria, no hematuria, no frequency, no urgency  MSK: no back pain, no neck pain, no extremity pain  NEURO: no headache, no dizziness  HEME: no easy bleeding or bruising  SKIN: no diaphoresis, no rash

## 2019-02-11 NOTE — ED ADULT NURSE NOTE - OBJECTIVE STATEMENT
63y female presents to ED complaining of SOB and LLE pain. Pt is a/ox4 with hx of ESRD on dialysis Monday Tuesday Thursday Friday, DM, CAD, HTN. PT states today she had a diaylsis tx for 2.5/3.5hrs, states when she got home she had sudden SOB. PT has hx of lung nodule with scheduled surgery for tomorrow that was cancelled due to having a death in the family, rescheduled for 2 weeks. PT states she has chronic LLE pain and swelling from vascular surgeries. Pt breathing spontaneous and unlabored with crackles it LLL to auscultation. Pt skin is warm, dry and intact with no edema present. Pt abdomen soft, nontender, nondistended. Pt PERRL, with equal strength bilaterally in upper and lower extremities with full sensation. Pt denies chest pain, denies n/v/d, denies fever/chills, denies dysuria, denies numbness/tingling and weakness.

## 2019-02-11 NOTE — ED PROVIDER NOTE - PROGRESS NOTE DETAILS
PGY1/MD Doretha. Spoke with Dr. Castro, pt is likely admitting. Dr. Castro agrees with admission to our hospitalist. PGY1/MD Doretha. admission for pneumonia and hyperpotassium, accepted by Dr. Viera. pt needs dialysis tomorrow, will contact Dr. Schmidt. PGY1/MD Doretha. admission for pneumonia and hyperpotassium, accepted by Dr. Viera. potassium is improved after treatmetn, now 4.5. pt needs dialysis tomorrow, will contact Dr. Schmidt. PGY1/MD Doretha. admission for pneumonia and hyperpotassium, accepted by Dr. Viera. potassium is improved after treatment, now 4.5. pt needs dialysis tomorrow, will contact Dr. Schmidt.

## 2019-02-11 NOTE — ED PROVIDER NOTE - ATTENDING CONTRIBUTION TO CARE
attending John: 63yF ESRD on  HD (M/Tu/Th/Sa) with partial dialysis today, DMI, CAD, HFrEF (EF 50% on TTE from 10/18), TIA, and PVD, recently diagnosed lung mass with surgical resection scheduled for next week p/w increased SOB x 1 day. +cough with yellow sputum. Will obtain cxr, labs, CTA chest eval for PE, RVP, ekg and admit.

## 2019-02-11 NOTE — ED PROVIDER NOTE - CARE PLAN
Principal Discharge DX:	Pneumonia of left lower lobe due to infectious organism  Secondary Diagnosis:	Hyperpotassemia

## 2019-02-11 NOTE — ED PROVIDER NOTE - MEDICAL DECISION MAKING DETAILS
PGY1/MD Doretha. 62 yo F with ESRD, on HD, recently identified lung tumor (suspect malignant), p/w SOB, onset this afternoon, with cough and sputum. May have infection, like pneumonia but also give the recent findings of malignancy (susp), pt needs CTA for r/o PE.

## 2019-02-12 DIAGNOSIS — N18.6 END STAGE RENAL DISEASE: ICD-10-CM

## 2019-02-12 DIAGNOSIS — E87.5 HYPERKALEMIA: ICD-10-CM

## 2019-02-12 DIAGNOSIS — E10.10 TYPE 1 DIABETES MELLITUS WITH KETOACIDOSIS WITHOUT COMA: ICD-10-CM

## 2019-02-12 DIAGNOSIS — E10.51 TYPE 1 DIABETES MELLITUS WITH DIABETIC PERIPHERAL ANGIOPATHY WITHOUT GANGRENE: ICD-10-CM

## 2019-02-12 DIAGNOSIS — J18.1 LOBAR PNEUMONIA, UNSPECIFIED ORGANISM: ICD-10-CM

## 2019-02-12 LAB
ALBUMIN SERPL ELPH-MCNC: 4.5 G/DL — SIGNIFICANT CHANGE UP (ref 3.3–5)
ALP SERPL-CCNC: 88 U/L — SIGNIFICANT CHANGE UP (ref 40–120)
ALT FLD-CCNC: 43 U/L — SIGNIFICANT CHANGE UP (ref 10–45)
ANION GAP SERPL CALC-SCNC: 18 MMOL/L — HIGH (ref 5–17)
ANION GAP SERPL CALC-SCNC: 20 MMOL/L — HIGH (ref 5–17)
ANION GAP SERPL CALC-SCNC: 23 MMOL/L — HIGH (ref 5–17)
ANION GAP SERPL CALC-SCNC: 26 MMOL/L — HIGH (ref 5–17)
AST SERPL-CCNC: 44 U/L — HIGH (ref 10–40)
B-OH-BUTYR SERPL-SCNC: 0 MMOL/L — SIGNIFICANT CHANGE UP
B-OH-BUTYR SERPL-SCNC: 1.7 MMOL/L — HIGH
BILIRUB SERPL-MCNC: 0.4 MG/DL — SIGNIFICANT CHANGE UP (ref 0.2–1.2)
BUN SERPL-MCNC: 33 MG/DL — HIGH (ref 7–23)
BUN SERPL-MCNC: 36 MG/DL — HIGH (ref 7–23)
BUN SERPL-MCNC: 38 MG/DL — HIGH (ref 7–23)
BUN SERPL-MCNC: 39 MG/DL — HIGH (ref 7–23)
CALCIUM SERPL-MCNC: 7.9 MG/DL — LOW (ref 8.4–10.5)
CALCIUM SERPL-MCNC: 8.4 MG/DL — SIGNIFICANT CHANGE UP (ref 8.4–10.5)
CALCIUM SERPL-MCNC: 8.6 MG/DL — SIGNIFICANT CHANGE UP (ref 8.4–10.5)
CALCIUM SERPL-MCNC: 8.7 MG/DL — SIGNIFICANT CHANGE UP (ref 8.4–10.5)
CHLORIDE SERPL-SCNC: 88 MMOL/L — LOW (ref 96–108)
CHLORIDE SERPL-SCNC: 88 MMOL/L — LOW (ref 96–108)
CHLORIDE SERPL-SCNC: 90 MMOL/L — LOW (ref 96–108)
CHLORIDE SERPL-SCNC: 91 MMOL/L — LOW (ref 96–108)
CO2 SERPL-SCNC: 22 MMOL/L — SIGNIFICANT CHANGE UP (ref 22–31)
CO2 SERPL-SCNC: 26 MMOL/L — SIGNIFICANT CHANGE UP (ref 22–31)
CO2 SERPL-SCNC: 26 MMOL/L — SIGNIFICANT CHANGE UP (ref 22–31)
CO2 SERPL-SCNC: 27 MMOL/L — SIGNIFICANT CHANGE UP (ref 22–31)
CREAT SERPL-MCNC: 4.67 MG/DL — HIGH (ref 0.5–1.3)
CREAT SERPL-MCNC: 4.73 MG/DL — HIGH (ref 0.5–1.3)
CREAT SERPL-MCNC: 4.92 MG/DL — HIGH (ref 0.5–1.3)
CREAT SERPL-MCNC: 5.09 MG/DL — HIGH (ref 0.5–1.3)
GLUCOSE BLDC GLUCOMTR-MCNC: 123 MG/DL — HIGH (ref 70–99)
GLUCOSE BLDC GLUCOMTR-MCNC: 143 MG/DL — HIGH (ref 70–99)
GLUCOSE BLDC GLUCOMTR-MCNC: 150 MG/DL — HIGH (ref 70–99)
GLUCOSE BLDC GLUCOMTR-MCNC: 161 MG/DL — HIGH (ref 70–99)
GLUCOSE BLDC GLUCOMTR-MCNC: 311 MG/DL — HIGH (ref 70–99)
GLUCOSE BLDC GLUCOMTR-MCNC: 363 MG/DL — HIGH (ref 70–99)
GLUCOSE BLDC GLUCOMTR-MCNC: 459 MG/DL — CRITICAL HIGH (ref 70–99)
GLUCOSE BLDC GLUCOMTR-MCNC: 498 MG/DL — CRITICAL HIGH (ref 70–99)
GLUCOSE BLDC GLUCOMTR-MCNC: 535 MG/DL — CRITICAL HIGH (ref 70–99)
GLUCOSE BLDC GLUCOMTR-MCNC: 538 MG/DL — CRITICAL HIGH (ref 70–99)
GLUCOSE BLDC GLUCOMTR-MCNC: 60 MG/DL — LOW (ref 70–99)
GLUCOSE BLDC GLUCOMTR-MCNC: 60 MG/DL — LOW (ref 70–99)
GLUCOSE BLDC GLUCOMTR-MCNC: 68 MG/DL — LOW (ref 70–99)
GLUCOSE SERPL-MCNC: 189 MG/DL — HIGH (ref 70–99)
GLUCOSE SERPL-MCNC: 366 MG/DL — HIGH (ref 70–99)
GLUCOSE SERPL-MCNC: 401 MG/DL — HIGH (ref 70–99)
GLUCOSE SERPL-MCNC: 599 MG/DL — CRITICAL HIGH (ref 70–99)
HCT VFR BLD CALC: 29.8 % — LOW (ref 34.5–45)
HGB BLD-MCNC: 9.6 G/DL — LOW (ref 11.5–15.5)
LACTATE SERPL-SCNC: 4.5 MMOL/L — CRITICAL HIGH (ref 0.7–2)
MCHC RBC-ENTMCNC: 32.4 GM/DL — SIGNIFICANT CHANGE UP (ref 32–36)
MCHC RBC-ENTMCNC: 33.4 PG — SIGNIFICANT CHANGE UP (ref 27–34)
MCV RBC AUTO: 103 FL — HIGH (ref 80–100)
NT-PROBNP SERPL-SCNC: HIGH PG/ML (ref 0–300)
PLATELET # BLD AUTO: 246 K/UL — SIGNIFICANT CHANGE UP (ref 150–400)
POTASSIUM SERPL-MCNC: 4.6 MMOL/L — SIGNIFICANT CHANGE UP (ref 3.5–5.3)
POTASSIUM SERPL-MCNC: 5.2 MMOL/L — SIGNIFICANT CHANGE UP (ref 3.5–5.3)
POTASSIUM SERPL-MCNC: 5.4 MMOL/L — HIGH (ref 3.5–5.3)
POTASSIUM SERPL-MCNC: 5.4 MMOL/L — HIGH (ref 3.5–5.3)
POTASSIUM SERPL-SCNC: 4.6 MMOL/L — SIGNIFICANT CHANGE UP (ref 3.5–5.3)
POTASSIUM SERPL-SCNC: 5.2 MMOL/L — SIGNIFICANT CHANGE UP (ref 3.5–5.3)
POTASSIUM SERPL-SCNC: 5.4 MMOL/L — HIGH (ref 3.5–5.3)
POTASSIUM SERPL-SCNC: 5.4 MMOL/L — HIGH (ref 3.5–5.3)
PROT SERPL-MCNC: 7 G/DL — SIGNIFICANT CHANGE UP (ref 6–8.3)
RBC # BLD: 2.88 M/UL — LOW (ref 3.8–5.2)
RBC # FLD: 14.6 % — HIGH (ref 10.3–14.5)
SODIUM SERPL-SCNC: 133 MMOL/L — LOW (ref 135–145)
SODIUM SERPL-SCNC: 136 MMOL/L — SIGNIFICANT CHANGE UP (ref 135–145)
SODIUM SERPL-SCNC: 137 MMOL/L — SIGNIFICANT CHANGE UP (ref 135–145)
SODIUM SERPL-SCNC: 139 MMOL/L — SIGNIFICANT CHANGE UP (ref 135–145)
TROPONIN T, HIGH SENSITIVITY RESULT: 240 NG/L — HIGH (ref 0–51)
TROPONIN T, HIGH SENSITIVITY RESULT: 260 NG/L — HIGH (ref 0–51)
WBC # BLD: 8 K/UL — SIGNIFICANT CHANGE UP (ref 3.8–10.5)
WBC # FLD AUTO: 8 K/UL — SIGNIFICANT CHANGE UP (ref 3.8–10.5)

## 2019-02-12 PROCEDURE — 99223 1ST HOSP IP/OBS HIGH 75: CPT

## 2019-02-12 RX ORDER — METOPROLOL TARTRATE 50 MG
50 TABLET ORAL DAILY
Qty: 0 | Refills: 0 | Status: DISCONTINUED | OUTPATIENT
Start: 2019-02-12 | End: 2019-02-21

## 2019-02-12 RX ORDER — INSULIN LISPRO 100/ML
3 VIAL (ML) SUBCUTANEOUS
Qty: 0 | Refills: 0 | Status: DISCONTINUED | OUTPATIENT
Start: 2019-02-12 | End: 2019-02-15

## 2019-02-12 RX ORDER — ATORVASTATIN CALCIUM 80 MG/1
20 TABLET, FILM COATED ORAL AT BEDTIME
Qty: 0 | Refills: 0 | Status: DISCONTINUED | OUTPATIENT
Start: 2019-02-12 | End: 2019-02-21

## 2019-02-12 RX ORDER — GLUCAGON INJECTION, SOLUTION 0.5 MG/.1ML
1 INJECTION, SOLUTION SUBCUTANEOUS ONCE
Qty: 0 | Refills: 0 | Status: DISCONTINUED | OUTPATIENT
Start: 2019-02-12 | End: 2019-02-21

## 2019-02-12 RX ORDER — HEPARIN SODIUM 5000 [USP'U]/ML
5000 INJECTION INTRAVENOUS; SUBCUTANEOUS EVERY 12 HOURS
Qty: 0 | Refills: 0 | Status: DISCONTINUED | OUTPATIENT
Start: 2019-02-12 | End: 2019-02-21

## 2019-02-12 RX ORDER — CINACALCET 30 MG/1
60 TABLET, FILM COATED ORAL DAILY
Qty: 0 | Refills: 0 | Status: DISCONTINUED | OUTPATIENT
Start: 2019-02-12 | End: 2019-02-21

## 2019-02-12 RX ORDER — PIPERACILLIN AND TAZOBACTAM 4; .5 G/20ML; G/20ML
3.38 INJECTION, POWDER, LYOPHILIZED, FOR SOLUTION INTRAVENOUS EVERY 12 HOURS
Qty: 0 | Refills: 0 | Status: DISCONTINUED | OUTPATIENT
Start: 2019-02-12 | End: 2019-02-18

## 2019-02-12 RX ORDER — OXYCODONE AND ACETAMINOPHEN 5; 325 MG/1; MG/1
1 TABLET ORAL ONCE
Qty: 0 | Refills: 0 | Status: DISCONTINUED | OUTPATIENT
Start: 2019-02-12 | End: 2019-02-12

## 2019-02-12 RX ORDER — INSULIN LISPRO 100/ML
4 VIAL (ML) SUBCUTANEOUS
Qty: 0 | Refills: 0 | Status: DISCONTINUED | OUTPATIENT
Start: 2019-02-12 | End: 2019-02-12

## 2019-02-12 RX ORDER — DEXTROSE 50 % IN WATER 50 %
25 SYRINGE (ML) INTRAVENOUS ONCE
Qty: 0 | Refills: 0 | Status: DISCONTINUED | OUTPATIENT
Start: 2019-02-12 | End: 2019-02-21

## 2019-02-12 RX ORDER — VANCOMYCIN HCL 1 G
1000 VIAL (EA) INTRAVENOUS ONCE
Qty: 0 | Refills: 0 | Status: COMPLETED | OUTPATIENT
Start: 2019-02-12 | End: 2019-02-12

## 2019-02-12 RX ORDER — INSULIN LISPRO 100/ML
VIAL (ML) SUBCUTANEOUS
Qty: 0 | Refills: 0 | Status: DISCONTINUED | OUTPATIENT
Start: 2019-02-12 | End: 2019-02-21

## 2019-02-12 RX ORDER — HYDRALAZINE HCL 50 MG
100 TABLET ORAL EVERY 8 HOURS
Qty: 0 | Refills: 0 | Status: DISCONTINUED | OUTPATIENT
Start: 2019-02-12 | End: 2019-02-21

## 2019-02-12 RX ORDER — DEXTROSE 50 % IN WATER 50 %
15 SYRINGE (ML) INTRAVENOUS ONCE
Qty: 0 | Refills: 0 | Status: COMPLETED | OUTPATIENT
Start: 2019-02-12 | End: 2019-02-12

## 2019-02-12 RX ORDER — INSULIN DETEMIR 100/ML (3)
7 INSULIN PEN (ML) SUBCUTANEOUS AT BEDTIME
Qty: 0 | Refills: 0 | Status: DISCONTINUED | OUTPATIENT
Start: 2019-02-12 | End: 2019-02-14

## 2019-02-12 RX ORDER — DEXTROSE 50 % IN WATER 50 %
15 SYRINGE (ML) INTRAVENOUS ONCE
Qty: 0 | Refills: 0 | Status: DISCONTINUED | OUTPATIENT
Start: 2019-02-12 | End: 2019-02-21

## 2019-02-12 RX ORDER — INSULIN GLARGINE 100 [IU]/ML
9 INJECTION, SOLUTION SUBCUTANEOUS ONCE
Qty: 0 | Refills: 0 | Status: COMPLETED | OUTPATIENT
Start: 2019-02-12 | End: 2019-02-12

## 2019-02-12 RX ORDER — INSULIN LISPRO 100/ML
3 VIAL (ML) SUBCUTANEOUS
Qty: 0 | Refills: 0 | Status: DISCONTINUED | OUTPATIENT
Start: 2019-02-12 | End: 2019-02-20

## 2019-02-12 RX ORDER — SEVELAMER CARBONATE 2400 MG/1
800 POWDER, FOR SUSPENSION ORAL
Qty: 0 | Refills: 0 | Status: DISCONTINUED | OUTPATIENT
Start: 2019-02-12 | End: 2019-02-21

## 2019-02-12 RX ORDER — DULOXETINE HYDROCHLORIDE 30 MG/1
60 CAPSULE, DELAYED RELEASE ORAL DAILY
Qty: 0 | Refills: 0 | Status: DISCONTINUED | OUTPATIENT
Start: 2019-02-12 | End: 2019-02-21

## 2019-02-12 RX ORDER — SODIUM CHLORIDE 9 MG/ML
1000 INJECTION INTRAMUSCULAR; INTRAVENOUS; SUBCUTANEOUS ONCE
Qty: 0 | Refills: 0 | Status: COMPLETED | OUTPATIENT
Start: 2019-02-12 | End: 2019-02-12

## 2019-02-12 RX ORDER — INSULIN LISPRO 100/ML
6 VIAL (ML) SUBCUTANEOUS ONCE
Qty: 0 | Refills: 0 | Status: COMPLETED | OUTPATIENT
Start: 2019-02-12 | End: 2019-02-12

## 2019-02-12 RX ORDER — SODIUM CHLORIDE 9 MG/ML
1000 INJECTION, SOLUTION INTRAVENOUS
Qty: 0 | Refills: 0 | Status: DISCONTINUED | OUTPATIENT
Start: 2019-02-12 | End: 2019-02-21

## 2019-02-12 RX ORDER — DEXTROSE 50 % IN WATER 50 %
12.5 SYRINGE (ML) INTRAVENOUS ONCE
Qty: 0 | Refills: 0 | Status: DISCONTINUED | OUTPATIENT
Start: 2019-02-12 | End: 2019-02-21

## 2019-02-12 RX ORDER — INSULIN LISPRO 100/ML
VIAL (ML) SUBCUTANEOUS AT BEDTIME
Qty: 0 | Refills: 0 | Status: DISCONTINUED | OUTPATIENT
Start: 2019-02-12 | End: 2019-02-13

## 2019-02-12 RX ORDER — ASPIRIN/CALCIUM CARB/MAGNESIUM 324 MG
81 TABLET ORAL DAILY
Qty: 0 | Refills: 0 | Status: DISCONTINUED | OUTPATIENT
Start: 2019-02-12 | End: 2019-02-21

## 2019-02-12 RX ORDER — AZITHROMYCIN 500 MG/1
500 TABLET, FILM COATED ORAL EVERY 24 HOURS
Qty: 0 | Refills: 0 | Status: DISCONTINUED | OUTPATIENT
Start: 2019-02-12 | End: 2019-02-18

## 2019-02-12 RX ORDER — INSULIN GLARGINE 100 [IU]/ML
10 INJECTION, SOLUTION SUBCUTANEOUS AT BEDTIME
Qty: 0 | Refills: 0 | Status: DISCONTINUED | OUTPATIENT
Start: 2019-02-12 | End: 2019-02-12

## 2019-02-12 RX ADMIN — ATORVASTATIN CALCIUM 20 MILLIGRAM(S): 80 TABLET, FILM COATED ORAL at 21:40

## 2019-02-12 RX ADMIN — OXYCODONE AND ACETAMINOPHEN 1 TABLET(S): 5; 325 TABLET ORAL at 09:47

## 2019-02-12 RX ADMIN — AZITHROMYCIN 250 MILLIGRAM(S): 500 TABLET, FILM COATED ORAL at 21:43

## 2019-02-12 RX ADMIN — Medication 100 MILLIGRAM(S): at 21:41

## 2019-02-12 RX ADMIN — Medication 6 UNIT(S): at 01:35

## 2019-02-12 RX ADMIN — SEVELAMER CARBONATE 800 MILLIGRAM(S): 2400 POWDER, FOR SUSPENSION ORAL at 16:49

## 2019-02-12 RX ADMIN — INSULIN GLARGINE 9 UNIT(S): 100 INJECTION, SOLUTION SUBCUTANEOUS at 01:35

## 2019-02-12 RX ADMIN — PIPERACILLIN AND TAZOBACTAM 25 GRAM(S): 4; .5 INJECTION, POWDER, LYOPHILIZED, FOR SOLUTION INTRAVENOUS at 06:24

## 2019-02-12 RX ADMIN — OXYCODONE AND ACETAMINOPHEN 1 TABLET(S): 5; 325 TABLET ORAL at 21:39

## 2019-02-12 RX ADMIN — Medication 15 GRAM(S): at 08:16

## 2019-02-12 RX ADMIN — Medication 50 MILLIGRAM(S): at 06:24

## 2019-02-12 RX ADMIN — OXYCODONE AND ACETAMINOPHEN 1 TABLET(S): 5; 325 TABLET ORAL at 22:30

## 2019-02-12 RX ADMIN — CINACALCET 60 MILLIGRAM(S): 30 TABLET, FILM COATED ORAL at 14:28

## 2019-02-12 RX ADMIN — Medication 7 UNIT(S): at 21:42

## 2019-02-12 RX ADMIN — SEVELAMER CARBONATE 800 MILLIGRAM(S): 2400 POWDER, FOR SUSPENSION ORAL at 08:03

## 2019-02-12 RX ADMIN — DULOXETINE HYDROCHLORIDE 60 MILLIGRAM(S): 30 CAPSULE, DELAYED RELEASE ORAL at 14:28

## 2019-02-12 RX ADMIN — Medication 100 MILLIGRAM(S): at 14:29

## 2019-02-12 RX ADMIN — Medication 4 UNIT(S): at 08:40

## 2019-02-12 RX ADMIN — Medication 100 MILLIGRAM(S): at 06:24

## 2019-02-12 RX ADMIN — Medication 250 MILLIGRAM(S): at 06:30

## 2019-02-12 RX ADMIN — Medication 81 MILLIGRAM(S): at 14:28

## 2019-02-12 RX ADMIN — Medication 6 UNIT(S): at 04:29

## 2019-02-12 RX ADMIN — Medication 4 UNIT(S): at 14:30

## 2019-02-12 RX ADMIN — OXYCODONE AND ACETAMINOPHEN 1 TABLET(S): 5; 325 TABLET ORAL at 14:25

## 2019-02-12 RX ADMIN — Medication 6: at 21:43

## 2019-02-12 RX ADMIN — PIPERACILLIN AND TAZOBACTAM 25 GRAM(S): 4; .5 INJECTION, POWDER, LYOPHILIZED, FOR SOLUTION INTRAVENOUS at 18:45

## 2019-02-12 RX ADMIN — SEVELAMER CARBONATE 800 MILLIGRAM(S): 2400 POWDER, FOR SUSPENSION ORAL at 14:28

## 2019-02-12 NOTE — CONSULT NOTE ADULT - SUBJECTIVE AND OBJECTIVE BOX
pt seen and examined. full consult to follow.   + lactic acidosis.   + severe hyperglycemia   + hyperkalemia  + pna  + chf fluid overloaded    hd consent witnessed signed and in chart   repeat k level   insulin to start  check acetone level and beta hydroxy-butyrate levels given high AGAP  cont abx  d/w NP pt seen and examined. full consult to follow.   + lactic acidosis.   + severe hyperglycemia   + hyperkalemia  + pna  + chf fluid overloaded    hd consent witnessed signed and in chart   repeat k level   insulin to start  check acetone level and beta hydroxy-butyrate levels given high AGAP  cont abx  d/w NP       [facility]  [phone]  NEPHROLOGY      INITIAL CONSULT NOTE  --------------------------------------------------------------------------------  HPI:      Patient is a 64 yo F with ESRD (on HD M/Tu/Th/Sa), type 1 DM, CAD, HFrEF (EF 50% on TTE from 10/18), TIA, and PVD, recent admission here in late January for DKA with septic shock from unclear source found to have possible lung malignancy on imaging (septal thickening) and s/p MICU stay during that admission for pressor support and insulin gtt, now presenting with acute shortness of breath x 1 day associated with productive cough x 3 days. Patient reports that 3 days ago she began to have a frequent cough productive of yellowish sputum that she attributed to a cold. No sore throat. Minimal runny nose at that time. No fevers. Chronic chills unchanged for many years. Patient went to HD yesterday and had session without issue. However upon returning home took a nap which she frequently does after HD. After she awoke from nap patient felt dyspneic. Dypsnea did not seem to be positional or related to exertion. Patient with chronic and unchanged LLE swelling which she has undergone dopplers for in the past. Because of her SOB she decided to seek medical care here.  dx with pna. also with hyperkalemia. renal consult called. pt also receiving IVF bolus when seen     PAST HISTORY  --------------------------------------------------------------------------------  PAST MEDICAL & SURGICAL HISTORY:  CHF (congestive heart failure): EF 40-45%  Subclavian vein stenosis, left: s/p stent  DKA, type 1: 1/2015  ACS (acute coronary syndrome): 1/2015 - cath revealed 100% ostial stenosis not amenable to PCI - medical management  TIA (transient ischemic attack): x 2 - 8-9 years ago prior to ASD/VSD repair  CAD (coronary artery disease): s/p stents  Gout: past  CVA (cerebral infarction): with no residual, 8 yrs ago, prior to heart surgery - ST memory loss  Peripheral vascular disease: occluded left fem-pop bypass 5/2015  Diabetes mellitus type 1: Insulin Dependent - Medtr[employer]  Minimed Paradigm Insulin Pump - Novolog  ESRD (end stage renal disease): dialysis  M, tue, thursday, saturday  Hyperlipidemia  Status post device closure of ASD: &quot;clamshell&quot;  History of cardiac catheterization: 1/2015 - no intervention  S/P femoral-popliteal bypass surgery: L and R in 2013 with graft; 5/2015 CFA angioplasty left and ileofemoral endarterectomywith vein patch angioplasty of left fem-pop bypass graft  Multiple vascular surgery both leg, left fempop bypass revision 11/2015  AV (arteriovenous fistula): Left AV graft; revision with stent placement 2-3 years ago  S/P cholecystectomy    FAMILY HISTORY:  Family history of smoking  Family history of hypertension  Family history of cancer (Sibling)    PAST SOCIAL HISTORY: no tobacco now no alcohol     ALLERGIES & MEDICATIONS  --------------------------------------------------------------------------------  Allergies    No Known Allergies    Intolerances      Standing Inpatient Medications  aspirin enteric coated 81 milliGRAM(s) Oral daily  atorvastatin 20 milliGRAM(s) Oral at bedtime  azithromycin  IVPB 500 milliGRAM(s) IV Intermittent every 24 hours  cinacalcet 60 milliGRAM(s) Oral daily  dextrose 5%. 1000 milliLiter(s) IV Continuous <Continuous>  dextrose 50% Injectable 12.5 Gram(s) IV Push once  dextrose 50% Injectable 25 Gram(s) IV Push once  dextrose 50% Injectable 25 Gram(s) IV Push once  DULoxetine 60 milliGRAM(s) Oral daily  heparin  Injectable 5000 Unit(s) SubCutaneous every 12 hours  hydrALAZINE 100 milliGRAM(s) Oral every 8 hours  insulin glargine Injectable (LANTUS) 10 Unit(s) SubCutaneous at bedtime  insulin lispro (HumaLOG) corrective regimen sliding scale   SubCutaneous three times a day before meals  insulin lispro (HumaLOG) corrective regimen sliding scale   SubCutaneous at bedtime  insulin lispro Injectable (HumaLOG) 4 Unit(s) SubCutaneous before breakfast  insulin lispro Injectable (HumaLOG) 4 Unit(s) SubCutaneous before lunch  insulin lispro Injectable (HumaLOG) 4 Unit(s) SubCutaneous before dinner  metoprolol succinate ER 50 milliGRAM(s) Oral daily  piperacillin/tazobactam IVPB. 3.375 Gram(s) IV Intermittent every 12 hours  sevelamer hydrochloride 800 milliGRAM(s) Oral three times a day with meals    PRN Inpatient Medications  dextrose 40% Gel 15 Gram(s) Oral once PRN  glucagon  Injectable 1 milliGRAM(s) IntraMuscular once PRN      REVIEW OF SYSTEMS  --------------------------------------------------------------------------------  Gen: no  fevers/chills  +   Skin: No rashes  Head/Eyes/Ears/Mouth: No headache; Normal hearing;  No sinus pain/discomfort, sore throat  + runny nose   Respiratory: + dyspnea,  + cough, - wheezing, - hemoptysis  CV: No chest pain, or + palp   GI: No abdominal pain, + diarrhea, - nausea, - vomiting,   : No dysuria,   MSK: No joint pain/swelling; no back pain  Neuro: No dizziness/lightheadedness,     also with +  edema     All other systems were reviewed and are negative, except as noted.    VITALS/PHYSICAL EXAM  --------------------------------------------------------------------------------  T(C): 36.4 (02-12-19 @ 10:01), Max: 36.9 (02-11-19 @ 22:10)  HR: 83 (02-12-19 @ 10:01) (81 - 104)  BP: 126/76 (02-12-19 @ 10:01) (126/57 - 161/86)  RR: 18 (02-12-19 @ 10:01) (16 - 18)  SpO2: 98% (02-12-19 @ 10:01) (92% - 100%)  Wt(kg): --  Height (cm): 162.56 (02-11-19 @ 19:16)  Weight (kg): 53.5 (02-11-19 @ 19:16)  BMI (kg/m2): 20.2 (02-11-19 @ 19:16)  BSA (m2): 1.56 (02-11-19 @ 19:16)      Physical Exam:  	Gen: Non toxic ill appearing muscle wasting   	no jvd , supple neck,   	Pulm: decrease bs  no rales or ronchi or wheezing  	CV: RRR, S1S2; no rub  	Back: No CVA tenderness; no sacral edema  	Abd: +BS, soft, nontender/nondistended  	: No suprapubic tenderness  	UE: Warm, no cyanosis  no clubbing,  no edema; no asterixis  	LE: Warm, no cyanosis  no clubbing, 2 + pitting edema  	Neuro: alert and oriented. speech coherent   	  LABS/STUDIES  --------------------------------------------------------------------------------              9.6    8.0   >-----------<  246      [02-12-19 @ 04:08]              29.8     133  |  88  |  39  ----------------------------<  189      [02-12-19 @ 11:01]  5.4   |  27  |  5.09        Ca     7.9     [02-12-19 @ 11:01]  k 6.6     TPro  7.0  /  Alb  4.5  /  TBili  0.4  /  DBili  x   /  AST  44  /  ALT  43  /  AlkPhos  88  [02-11-19 @ 23:21]    PT/INR: PT 12.3 , INR 1.08       [02-11-19 @ 19:58]  PTT: 30.7       [02-11-19 @ 19:58]      Creatinine Trend:  SCr 5.09 [02-12 @ 11:01]  SCr 4.92 [02-12 @ 04:08]  SCr 4.73 [02-12 @ 01:38]  SCr 4.67 [02-11 @ 23:21]  SCr 4.51 [02-11 @ 19:58]    Urinalysis - [06-04-17 @ 08:24]      Color Yellow / Appearance Clear / SG 1.013 / pH 8.5      Gluc 1000 / Ketone Negative  / Bili Negative / Urobili Negative       Blood Negative / Protein >600 / Leuk Est Small / Nitrite Negative      RBC 3-5 / WBC 6-10 / Hyaline  / Gran  / Sq Epi  / Non Sq Epi OCC / Bacteria       PTH -- (Ca 8.3)      [03-03-18 @ 08:53]   134  HbA1c 8.6      [11-16-18 @ 20:14]  TSH 0.71      [11-17-18 @ 08:25]  Lipid: chol 113, TG 86, HDL 59, LDL 37      [04-30-18 @ 06:44]    HBsAb <3.0      [12-18-18 @ 02:39]  HBsAb Nonreact      [05-02-15 @ 15:35]  HBsAg Nonreact      [12-18-18 @ 02:39]  HBcAb Nonreact      [12-18-18 @ 02:39]  HCV 0.10, Nonreact      [12-18-18 @ 02:39]

## 2019-02-12 NOTE — H&P ADULT - NSHPLABSRESULTS_GEN_ALL_CORE
Labs personally reviewed:                        10.6   9.8   )-----------( 268      ( 11 Feb 2019 19:58 )             32.3       02-12    136  |  88<L>  |  36<H>  ----------------------------<  599<HH>  5.4<H>   |  22  |  4.73<H>    Ca    8.7      12 Feb 2019 01:38    TPro  7.0  /  Alb  4.5  /  TBili  0.4  /  DBili  x   /  AST  44<H>  /  ALT  43  /  AlkPhos  88  02-11            LIVER FUNCTIONS - ( 11 Feb 2019 23:21 )  Alb: 4.5 g/dL / Pro: 7.0 g/dL / ALK PHOS: 88 U/L / ALT: 43 U/L / AST: 44 U/L / GGT: x             PT/INR - ( 11 Feb 2019 19:58 )   PT: 12.3 sec;   INR: 1.08 ratio         PTT - ( 11 Feb 2019 19:58 )  PTT:30.7 sec    Imaging personally reviewed-LLL pneumonia w/appearance concerning for aspiration pneumonia, findings in R lung w/septal thickening concerning for malignancy.    EKG personally reviewed-NSR without clear t-wave peaking, QTc 503ms

## 2019-02-12 NOTE — CONSULT NOTE ADULT - ASSESSMENT
Patient is a 62 yo woman with uncontrolled type 1 diabetes, very labile blood sugars, non-adherent to diet at home with micro and macrovascular complications admitted for shortness of breath and pneumonia (High medical decision making)
64 yo F with ESRD (on HD M/Tu/Th/Sa), type 1 DM, CAD, HFrEF (EF 50% on TTE from 10/18), TIA, and PVD, who presented from HD with complaints of N/V and hyperglycemia. In the ED, patient found to be tachypneic (20), and hypotensive (as low as 70s/40s). Labs significant for leukocytopenia (WBC 3), hyperglycemia (269), elevated trops 201, with BHB of 6.3. s/p  500cc NS bolus. pt with recurrent dka    1- esrd  2- pna  3- pad  4- hyperglycemia  5- high AGAP acidosis   6- shpt   7- htn     hd consent obtained witnessed and placed in chart  s/p d50/insulin and cagluconate in er.     + lactic acidosis.   + severe hyperglycemia   + hyperkalemia  + pna  + chf fluid overloaded    repeat k level   insulin to start  check acetone level and beta hydroxy-butyrate levels given high AGAP  cont abx  she is very fluid overloaded and also with lactic acidosis   fluid removal cautiously given higher lactic acidosis and infection

## 2019-02-12 NOTE — PROGRESS NOTE ADULT - SUBJECTIVE AND OBJECTIVE BOX
[facility]   [phone]  DIALYSIS NOTE  Chief Complaint: ESRD/Ongoing hemodialysis requirement. seen on hd    24 hour events/subjective:    sleepy  answers questions         ALLERGIES & MEDICATIONS  --------------------------------------------------------------------------------  Allergies    No Known Allergies    Intolerances      Standing Inpatient Medications  aspirin enteric coated 81 milliGRAM(s) Oral daily  atorvastatin 20 milliGRAM(s) Oral at bedtime  azithromycin  IVPB 500 milliGRAM(s) IV Intermittent every 24 hours  cinacalcet 60 milliGRAM(s) Oral daily  dextrose 5%. 1000 milliLiter(s) IV Continuous <Continuous>  dextrose 50% Injectable 12.5 Gram(s) IV Push once  dextrose 50% Injectable 25 Gram(s) IV Push once  dextrose 50% Injectable 25 Gram(s) IV Push once  DULoxetine 60 milliGRAM(s) Oral daily  heparin  Injectable 5000 Unit(s) SubCutaneous every 12 hours  hydrALAZINE 100 milliGRAM(s) Oral every 8 hours  insulin glargine Injectable (LANTUS) 10 Unit(s) SubCutaneous at bedtime  insulin lispro (HumaLOG) corrective regimen sliding scale   SubCutaneous three times a day before meals  insulin lispro (HumaLOG) corrective regimen sliding scale   SubCutaneous at bedtime  insulin lispro Injectable (HumaLOG) 4 Unit(s) SubCutaneous before breakfast  insulin lispro Injectable (HumaLOG) 4 Unit(s) SubCutaneous before lunch  insulin lispro Injectable (HumaLOG) 4 Unit(s) SubCutaneous before dinner  metoprolol succinate ER 50 milliGRAM(s) Oral daily  piperacillin/tazobactam IVPB. 3.375 Gram(s) IV Intermittent every 12 hours  sevelamer hydrochloride 800 milliGRAM(s) Oral three times a day with meals    PRN Inpatient Medications  dextrose 40% Gel 15 Gram(s) Oral once PRN  glucagon  Injectable 1 milliGRAM(s) IntraMuscular once PRN      REVIEW OF SYSTEMS  --------------------------------------------------------------------------------  no itching or rash  no fever or chill  no cp or palp   no sob or cough   no N/V/D/ no abd pain   ext  ++  edema         VITALS/PHYSICAL EXAM  --------------------------------------------------------------------------------  T(C): 36.4 (02-12-19 @ 10:01), Max: 36.9 (02-11-19 @ 22:10)  HR: 83 (02-12-19 @ 10:01) (81 - 104)  BP: 126/76 (02-12-19 @ 10:01) (126/57 - 161/86)  RR: 18 (02-12-19 @ 10:01) (16 - 18)  SpO2: 98% (02-12-19 @ 10:01) (92% - 100%)  Wt(kg): --  Height (cm): 162.56 (02-11-19 @ 19:16)  Weight (kg): 53.5 (02-11-19 @ 19:16)  BMI (kg/m2): 20.2 (02-11-19 @ 19:16)  BSA (m2): 1.56 (02-11-19 @ 19:16)      02-12-19 @ 07:01  -  02-12-19 @ 12:33  --------------------------------------------------------  IN: 360 mL / OUT: 0 mL / NET: 360 mL      Physical Exam:  		  Gen: Non toxic ill appearing muscle wasting   	no jvd , supple neck,   	Pulm: decrease bs  no rales or ronchi or wheezing  	CV: RRR, S1S2; no rub  	Abd: +BS, soft, nontender/nondistended  	: No suprapubic tenderness  	UE: Warm, no cyanosis  no clubbing,  no edema; no asterixis  	LE: Warm, no cyanosis  no clubbing, 2 + pitting edema  	Neuro: alert and oriented. speech coherent       LABS/STUDIES  --------------------------------------------------------------------------------              9.6    8.0   >-----------<  246      [02-12-19 @ 04:08]              29.8     133  |  88  |  39  ----------------------------<  189      [02-12-19 @ 11:01]  5.4   |  27  |  5.09        Ca     7.9     [02-12-19 @ 11:01]    TPro  7.0  /  Alb  4.5  /  TBili  0.4  /  DBili  x   /  AST  44  /  ALT  43  /  AlkPhos  88  [02-11-19 @ 23:21]    PT/INR: PT 12.3 , INR 1.08       [02-11-19 @ 19:58]  PTT: 30.7       [02-11-19 @ 19:58]

## 2019-02-12 NOTE — H&P ADULT - ASSESSMENT
Patient is a 64 yo F with ESRD (on HD M/Tu/Th/Sa), type 1 DM, CAD, HFrEF (EF 50% on TTE from 10/18), TIA, and PVD, recent admission here in late January for DKA with septic shock from unclear source found to have possible lung malignancy on imaging (septal thickening) and s/p MICU stay during that admission for pressor support and insulin gtt, now presenting with acute shortness of breath x 1 day associated with productive cough x 3 days noted to have likely aspiration pneumonia as well as DKA

## 2019-02-12 NOTE — H&P ADULT - PROBLEM SELECTOR PLAN 1
Repeat BMP and lactate sent stat now. Gap has been widening, however last BMP currently available was drawn as humalog and lantus were given so likely do not reflect effect of those insulins. Will f/u repeat BMP. If gap continuing to widen will need MICU evaluation.  Further 6 units of humalog stat x 1 now.   Treat infection.

## 2019-02-12 NOTE — CONSULT NOTE ADULT - SUBJECTIVE AND OBJECTIVE BOX
Reason For Consult: Uncontrolled diabetes complicated by hypoglycemia    Patient is a 64 yo woman with uncontrolled type 1 diabetes with both micro and macrovascular disease including ESRD on HD, CVA, multiple admission for DKA, CAD, CHF, PVD admitted for pneumonia. Patient had recent admission in January for septic shock and was found to have possible lung malignancy.  She presents to this admission with cough, shortness of breath and productive cough. Endocrine asked to evaluate for uncontrolled type 1 diabetes complicated by hypoglycemia. Diabetes was diagnosed at age 21 years. She was on an insulin pump in the past but transitioned to basal/bolus insulin.  Her home regimen is Lantus 6-7 units at bedtime and humalog 3 units before meals. She is non-adherent to a diabetic diet and "eats everything" including [employer] Food, pizza, pasta, fast food.  No limitations and eats frequently throughout the day. States she checks her fingersticks regularly and the numberse range from 100s to upper 200's.    Her outpatient endocrinologist is Dr Ley  Last eye exam a few months ago, follows regularly per self report    PAST MEDICAL & SURGICAL HISTORY:  CHF (congestive heart failure): EF 40-45%  Subclavian vein stenosis, left: s/p stent  DKA, type 1: 1/2015  ACS (acute coronary syndrome): 1/2015 - cath revealed 100% ostial stenosis not amenable to PCI - medical management  TIA (transient ischemic attack): x 2 - 8-9 years ago prior to ASD/VSD repair  CAD (coronary artery disease): s/p stents  Gout: past  CVA (cerebral infarction): with no residual, 8 yrs ago, prior to heart surgery - ST memory loss  Peripheral vascular disease: occluded left fem-pop bypass 5/2015  Diabetes mellitus type 1: Insulin Dependent - [employer]  Minimed Paradigm Insulin Pump - Novolog  ESRD (end stage renal disease): dialysis  M, tue, thursday, saturday  Hyperlipidemia  Status post device closure of ASD  History of cardiac catheterization: 1/2015 - no intervention  S/P femoral-popliteal bypass surgery: L and R in 2013 with graft; 5/2015 CFA angioplasty left and ileofemoral endarterectomywith vein patch angioplasty of left fem-pop bypass graft  Multiple vascular surgery both leg, left fempop bypass revision 11/2015  AV (arteriovenous fistula): Left AV graft; revision with stent placement 2-3 years ago  S/P cholecystectomy    FAMILY HISTORY:  Family history of smoking  Family history of hypertension  Family history of cancer (Sibling)    Social History:  Former smoker, quit 15-18 years ago  Lives with   No recreational drugs  No alcohol    Outpatient Medications:  · 	HumaLOG KwikPen 100 units/mL injectable solution: 5 unit(s) injectable 3 times a day (before meals)  · 	Lantus Solostar Pen 100 units/mL subcutaneous solution: 10 unit(s) subcutaneous once a day (at bedtime)  . Please dispense 90-day supply.   · 	senna oral tablet: 2 tab(s) orally once a day (at bedtime), As Needed -for constipation   · 	atorvastatin 20 mg oral tablet: 1 tab(s) orally once a day (at bedtime)  · 	aspirin 81 mg oral delayed release tablet: 1 tab(s) orally once a day  · 	Multiple Vitamins oral tablet: 1 tab(s) orally once a day  · 	Metoprolol Succinate ER 50 mg oral tablet, extended release: 1 tab(s) orally once a day  · 	DULoxetine 60 mg oral delayed release capsule: 1 cap(s) orally once a day  · 	Sensipar 60 mg oral tablet: 1 tab(s) orally once a day  · 	Renvela 800 mg oral tablet: 3 tab(s) orally  (with meals)  · 	oxyCODONE-acetaminophen 5 mg-325 mg oral tablet: 1 tab(s) orally 1 to 2 times a day, As Needed for severe pain  · 	hydrALAZINE 25 mg oral tablet: 4 tab(s) orally every 8 hours      MEDICATIONS  (STANDING):  aspirin enteric coated 81 milliGRAM(s) Oral daily  atorvastatin 20 milliGRAM(s) Oral at bedtime  azithromycin  IVPB 500 milliGRAM(s) IV Intermittent every 24 hours  cinacalcet 60 milliGRAM(s) Oral daily  dextrose 5%. 1000 milliLiter(s) (50 mL/Hr) IV Continuous <Continuous>  dextrose 50% Injectable 12.5 Gram(s) IV Push once  dextrose 50% Injectable 25 Gram(s) IV Push once  dextrose 50% Injectable 25 Gram(s) IV Push once  DULoxetine 60 milliGRAM(s) Oral daily  heparin  Injectable 5000 Unit(s) SubCutaneous every 12 hours  hydrALAZINE 100 milliGRAM(s) Oral every 8 hours  insulin glargine Injectable (LANTUS) 10 Unit(s) SubCutaneous at bedtime  insulin lispro (HumaLOG) corrective regimen sliding scale   SubCutaneous three times a day before meals  insulin lispro (HumaLOG) corrective regimen sliding scale   SubCutaneous at bedtime  insulin lispro Injectable (HumaLOG) 4 Unit(s) SubCutaneous before breakfast  insulin lispro Injectable (HumaLOG) 4 Unit(s) SubCutaneous before lunch  insulin lispro Injectable (HumaLOG) 4 Unit(s) SubCutaneous before dinner  metoprolol succinate ER 50 milliGRAM(s) Oral daily  piperacillin/tazobactam IVPB. 3.375 Gram(s) IV Intermittent every 12 hours  sevelamer hydrochloride 800 milliGRAM(s) Oral three times a day with meals    MEDICATIONS  (PRN):  dextrose 40% Gel 15 Gram(s) Oral once PRN Blood Glucose LESS THAN 70 milliGRAM(s)/deciliter  glucagon  Injectable 1 milliGRAM(s) IntraMuscular once PRN Glucose LESS THAN 70 milligrams/deciliter      Allergies  No Known Allergies    Review of Systems:  Constitutional: No fever but not feeling well, "I have pneumonia"  Eyes: No blurry vision  Neuro: No tremors  HEENT: No pain  Cardiovascular: No chest pain, palpitations  Respiratory: + SOB, + cough  GI: No nausea, vomiting, abdominal pain  : No dysuria  Skin: no rash  ALL OTHER SYSTEMS REVIEWED AND NEGATIVE    PHYSICAL EXAM:  VITALS: T(C): 36.4 (02-12-19 @ 10:01)  T(F): 97.5 (02-12-19 @ 10:01), Max: 98.4 (02-11-19 @ 22:10)  HR: 83 (02-12-19 @ 10:01) (81 - 104)  BP: 126/76 (02-12-19 @ 10:01) (126/57 - 161/86)  RR:  (16 - 18)  SpO2:  (92% - 100%)  Wt(kg): --  GENERAL: appears cachectic, no acute distress, currently undergoing hemodialysis  EYES: No proptosis, no lid lag, anicteric  HEENT:  adentulous  RESPIRATORY: respirations are even and unlabored, no use of accessory muscles, decreased breath sounds bilaterally  CARDIOVASCULAR: Regular rate and rhythm; No murmurs; left trace peripheral edema.  Left AV fistula  GI: Soft, nontender, non distended, normal bowel sounds  SKIN: Dry, intact, No rashes or lesions; no open foot ulcers  MUSCULOSKELETAL: moves all extremities  PSYCH: Alert and oriented x 3, reactive affect, normal mood    POCT Blood Glucose.: 150 mg/dL (02-12-19 @ 08:39)  POCT Blood Glucose.: 68 mg/dL (02-12-19 @ 08:17)  POCT Blood Glucose.: 60 mg/dL (02-12-19 @ 07:58)  POCT Blood Glucose.: 60 mg/dL (02-12-19 @ 07:58)  Humalog 4  POCT Blood Glucose.: 161 mg/dL (02-12-19 @ 06:11)  POCT Blood Glucose.: 459 mg/dL (02-12-19 @ 03:29)  POCT Blood Glucose.: 538 mg/dL (02-12-19 @ 02:29)  POCT Blood Glucose.: 535 mg/dL (02-12-19 @ 00:55)  Lantus 9  POCT Blood Glucose.: 498 mg/dL (02-12-19 @ 00:53)  POCT Blood Glucose.: 463 mg/dL (02-11-19 @ 21:17)                            9.6    8.0   )-----------( 246      ( 12 Feb 2019 04:08 )             29.8       02-12    133<L>  |  88<L>  |  39<H>  ----------------------------<  189<H>  5.4<H>   |  27  |  5.09<H>    EGFR if : 10<L>  EGFR if non : 8<L>    Ca    7.9<L>      02-12    TPro  7.0  /  Alb  4.5  /  TBili  0.4  /  DBili  x   /  AST  44<H>  /  ALT  43  /  AlkPhos  88  02-11  Hemoglobin A1C, Whole Blood: 8.6 % <H> [4.0 - 5.6] (11-16-18 @ 20:14)

## 2019-02-12 NOTE — PROGRESS NOTE ADULT - SUBJECTIVE AND OBJECTIVE BOX
PULMONARY PROGRESS NOTE    NATHAN MEEKS  MRN-[id number]    Patient is a 63y old  Female who presents with a chief complaint of DKA/Hyperkalemia/Pneumonia (12 Feb 2019 12:32)      HPI:  -reports productive cough and dyspnea for the past two days prior to hospital admission, today feeling a  little better but still coughing.  dyspnea improved.     ROS:   -no N/V    ACTIVE MEDICATION LIST:  MEDICATIONS  (STANDING):  aspirin enteric coated 81 milliGRAM(s) Oral daily  atorvastatin 20 milliGRAM(s) Oral at bedtime  azithromycin  IVPB 500 milliGRAM(s) IV Intermittent every 24 hours  cinacalcet 60 milliGRAM(s) Oral daily  dextrose 5%. 1000 milliLiter(s) (50 mL/Hr) IV Continuous <Continuous>  dextrose 50% Injectable 12.5 Gram(s) IV Push once  dextrose 50% Injectable 25 Gram(s) IV Push once  dextrose 50% Injectable 25 Gram(s) IV Push once  DULoxetine 60 milliGRAM(s) Oral daily  heparin  Injectable 5000 Unit(s) SubCutaneous every 12 hours  hydrALAZINE 100 milliGRAM(s) Oral every 8 hours  insulin glargine Injectable (LANTUS) 10 Unit(s) SubCutaneous at bedtime  insulin lispro (HumaLOG) corrective regimen sliding scale   SubCutaneous three times a day before meals  insulin lispro (HumaLOG) corrective regimen sliding scale   SubCutaneous at bedtime  insulin lispro Injectable (HumaLOG) 4 Unit(s) SubCutaneous before breakfast  insulin lispro Injectable (HumaLOG) 4 Unit(s) SubCutaneous before lunch  insulin lispro Injectable (HumaLOG) 4 Unit(s) SubCutaneous before dinner  metoprolol succinate ER 50 milliGRAM(s) Oral daily  piperacillin/tazobactam IVPB. 3.375 Gram(s) IV Intermittent every 12 hours  sevelamer hydrochloride 800 milliGRAM(s) Oral three times a day with meals    MEDICATIONS  (PRN):  dextrose 40% Gel 15 Gram(s) Oral once PRN Blood Glucose LESS THAN 70 milliGRAM(s)/deciliter  glucagon  Injectable 1 milliGRAM(s) IntraMuscular once PRN Glucose LESS THAN 70 milligrams/deciliter      EXAM:  Vital Signs Last 24 Hrs  T(C): 36.4 (12 Feb 2019 10:01), Max: 36.9 (11 Feb 2019 22:10)  T(F): 97.5 (12 Feb 2019 10:01), Max: 98.4 (11 Feb 2019 22:10)  HR: 83 (12 Feb 2019 10:01) (81 - 104)  BP: 126/76 (12 Feb 2019 10:01) (126/57 - 161/86)  BP(mean): --  RR: 18 (12 Feb 2019 10:01) (16 - 18)  SpO2: 98% (12 Feb 2019 10:01) (92% - 100%)    GENERAL: The patient is awake and alert in no apparent distress.     LUNGS: Clear to auscultation without wheezing, rales or rhonchi; respirations unlabored    HEART: S1/S2    LABS/IMAGING: reviewed                        9.6    8.0   )-----------( 246      ( 12 Feb 2019 04:08 )             29.8   02-12    133<L>  |  88<L>  |  39<H>  ----------------------------<  189<H>  5.4<H>   |  27  |  5.09<H>    Ca    7.9<L>      12 Feb 2019 11:01    TPro  7.0  /  Alb  4.5  /  TBili  0.4  /  DBili  x   /  AST  44<H>  /  ALT  43  /  AlkPhos  88  02-11    < from: CT Angio Chest w/ IV Cont (02.11.19 @ 20:37) >  IMPRESSION:   No pulmonary embolism.    Debris/secretions within the trachea and left main bronchus. Scattered   patchy/groundglassleft lower lobe opacities may be infectious in   etiology. This constellation of findings can be seen in the setting of   aspiration.    Unilateral interlobular septal thickening on the right as seen on the   prior CT of the chest dated 1/23/2019. Lymphangitic spread of malignancy   cannot be excluded.    Right paratracheal, subcarinal and hilar lymphadenopathy, overall not   significant change from 1/23/2019.    Consider PET/CT for further evaluation.    < end of copied text >      PROBLEM LIST:  63y Female with HEALTH ISSUES - PROBLEM Dx:  Type 1 diabetes mellitus with diabetic peripheral angiopathy without gangrene: Type 1 diabetes mellitus with diabetic peripheral angiopathy without gangrene  ESRD (end stage renal disease): ESRD (end stage renal disease)  Hyperkalemia: Hyperkalemia  Pneumonia of left lower lobe due to infectious organism: Pneumonia of left lower lobe due to infectious organism  Diabetic ketoacidosis without coma associated with type 1 diabetes mellitus: Diabetic ketoacidosis without coma associated with type 1 diabetes mellitus    RECS:  -HCAP coverage  -swallow eval  -for thoracic surgery in 2 weeks for abnormal ct  -hd per renal    Alem Tate MD   [phone]

## 2019-02-12 NOTE — CONSULT NOTE ADULT - PROBLEM SELECTOR RECOMMENDATION 9
-patient was ordered for Lantus 10 and hypoglycemia to the 60's this morning. Patient has very labile fingersticks with many hypo and hyperglycemic excursions in the past. In the setting of her renal disease, levemir may be a safer choice.  Change to Levemir 7 units at bedtime and decrease humalog to 3 units TID with meals  -low correction sliding scale  -consistent carbohydrate diet  -target glucose 100-180 ideally  -she is currently in hemodialysis and no recent FS is available at this time.  She is otherwise asymptomatic, mentating and contributing to providing medical history -patient was ordered for Lantus 10 and hypoglycemia to the 60's this morning. Patient has very labile fingersticks with many hypo and hyperglycemic excursions in the past. In the setting of her renal disease, levemir may be a safer choice.  Change to Levemir 7 units at bedtime and decrease humalog to 3 units TID with meals  -low correction sliding scale  -consistent carbohydrate diet  -target glucose 100-180 ideally  -she is currently in hemodialysis and no recent FS is available at this time.  She is otherwise asymptomatic, mentating and contributing to providing medical history  -the patient has type 1 diabetes and requires insulin with all meals.  If there is any question as to whether it should be given, please contact endocrinology.  Do not hold if the patient will be eating.  Contact endocrine at [phone] or page fellow on call with questions

## 2019-02-12 NOTE — CONSULT NOTE ADULT - SUBJECTIVE AND OBJECTIVE BOX
CHIEF COMPLAINT: "I have pneumonia"    HISTORY OF PRESENT ILLNESS:  62 yo F with ESRD (on HD M/Tu/Th/Sa), type 1 DM, CAD, HFrEF (EF 50% on TTE from 10/18), TIA, and PVD, recent admission here in late January for DKA with septic shock from unclear source found to have possible lung malignancy on imaging (septal thickening) and s/p MICU stay during that admission for pressor support and insulin gtt, now presenting with acute shortness of breath x 1 day associated with productive cough x 3 days. Patient reports that 3 days ago she began to have a frequent cough productive of yellowish sputum that she attributed to a cold. No sore throat. Minimal runny nose at that time. No fevers. Chronic chills unchanged for many years. Patient went to HD yesterday and had session without issue. However upon returning home took a nap which she frequently does after HD. After she awoke from nap patient felt dyspneic. Dypsnea did not seem to be positional or related to exertion. Patient with chronic and unchanged LLE swelling which she has undergone dopplers for in the past. Because of her SOB she decided to seek medical care here. Patient reports taking her insulins today though did not take evening long acting insulin prior to coming to hospital. Denies abdominal pain or nausea/vomiting. Reports several episodes of watery diarrhea that started after getting to the hospital and receiving antibiotics, no diarrhea prior to that. Currently diarrhea has stopped. Makes minimal amounts of urine.     Patient denies any recent episodes of choking or coughing triggered by eating. Reports that she wears dentures to eat and swallows without issue .      Allergies  No Known Allergies      MEDICATIONS:  aspirin enteric coated 81 milliGRAM(s) Oral daily  heparin  Injectable 5000 Unit(s) SubCutaneous every 12 hours  hydrALAZINE 100 milliGRAM(s) Oral every 8 hours  metoprolol succinate ER 50 milliGRAM(s) Oral daily  azithromycin  IVPB 500 milliGRAM(s) IV Intermittent every 24 hours  piperacillin/tazobactam IVPB. 3.375 Gram(s) IV Intermittent every 12 hours  DULoxetine 60 milliGRAM(s) Oral daily  atorvastatin 20 milliGRAM(s) Oral at bedtime  cinacalcet 60 milliGRAM(s) Oral daily  dextrose 40% Gel 15 Gram(s) Oral once PRN  dextrose 50% Injectable 12.5 Gram(s) IV Push once  dextrose 50% Injectable 25 Gram(s) IV Push once  dextrose 50% Injectable 25 Gram(s) IV Push once  glucagon  Injectable 1 milliGRAM(s) IntraMuscular once PRN  insulin glargine Injectable (LANTUS) 10 Unit(s) SubCutaneous at bedtime  insulin lispro (HumaLOG) corrective regimen sliding scale   SubCutaneous three times a day before meals  insulin lispro (HumaLOG) corrective regimen sliding scale   SubCutaneous at bedtime  insulin lispro Injectable (HumaLOG) 4 Unit(s) SubCutaneous before breakfast  insulin lispro Injectable (HumaLOG) 4 Unit(s) SubCutaneous before lunch  insulin lispro Injectable (HumaLOG) 4 Unit(s) SubCutaneous before dinner  dextrose 5%. 1000 milliLiter(s) IV Continuous <Continuous>      PAST MEDICAL & SURGICAL HISTORY:  CHF (congestive heart failure): EF 40-45%  Subclavian vein stenosis, left: s/p stent  DKA, type 1: 1/2015  ACS (acute coronary syndrome): 1/2015 - cath revealed 100% ostial stenosis not amenable to PCI - medical management  TIA (transient ischemic attack): x 2 - 8-9 years ago prior to ASD/VSD repair  CAD (coronary artery disease): s/p stents  Gout: past  CVA (cerebral infarction): with no residual, 8 yrs ago, prior to heart surgery - ST memory loss  Peripheral vascular disease: occluded left fem-pop bypass 5/2015  Diabetes mellitus type 1: Insulin Dependent - [employer]  Minimed Paradigm Insulin Pump - Novolog  ESRD (end stage renal disease): dialysis  M, tue, thursday, saturday  Hyperlipidemia  Status post device closure of ASD: &quot;clamshell&quot;  History of cardiac catheterization: 1/2015 - no intervention  S/P femoral-popliteal bypass surgery: L and R in 2013 with graft; 5/2015 CFA angioplasty left and ileofemoral endarterectomywith vein patch angioplasty of left fem-pop bypass graft  Multiple vascular surgery both leg, left fempop bypass revision 11/2015  AV (arteriovenous fistula): Left AV graft; revision with stent placement 2-3 years ago  S/P cholecystectomy      FAMILY HISTORY:  Family history of smoking  Family history of hypertension  Family history of cancer (Sibling)      SOCIAL HISTORY:    former smoker. independent in adl's      REVIEW OF SYSTEMS:  See HPI, otherwise complete 10 point review of systems negative    [ ] All others negative	      PHYSICAL EXAM:  T(C): 36.4 (02-12-19 @ 05:40), Max: 36.9 (02-11-19 @ 22:10)  HR: 91 (02-12-19 @ 06:23) (81 - 104)  BP: 150/79 (02-12-19 @ 06:23) (126/57 - 161/86)  RR: 18 (02-12-19 @ 05:40) (16 - 18)  SpO2: 97% (02-12-19 @ 05:40) (92% - 100%)  Wt(kg): --  I&O's Summary      Appearance: No Acute Distress	  HEENT:  Normal oral mucosa, PERRL, EOMI	  Cardiovascular: Normal S1 S2, No JVD, No murmurs/rubs/gallops  Respiratory: Lungs clear to auscultation bilaterally  Gastrointestinal:  Soft, Non-tender, + BS	  Skin: No rashes, No ecchymoses, No cyanosis	  Neurologic: Non-focal  Extremities: No clubbing, cyanosis or edema  Vascular: Peripheral pulses palpable 2+ bilaterally  Psychiatry: A & O x 3, Mood & affect appropriate    Laboratory Data:	 	    CBC Full  -  ( 12 Feb 2019 04:08 )  WBC Count : 8.0 K/uL  Hemoglobin : 9.6 g/dL  Hematocrit : 29.8 %  Platelet Count - Automated : 246 K/uL  Mean Cell Volume : 103.0 fl  Mean Cell Hemoglobin : 33.4 pg  Mean Cell Hemoglobin Concentration : 32.4 gm/dL  Auto Neutrophil # : x  Auto Lymphocyte # : x  Auto Monocyte # : x  Auto Eosinophil # : x  Auto Basophil # : x  Auto Neutrophil % : x  Auto Lymphocyte % : x  Auto Monocyte % : x  Auto Eosinophil % : x  Auto Basophil % : x    02-12    137  |  91<L>  |  38<H>  ----------------------------<  401<H>  5.2   |  26  |  4.92<H>  02-12    136  |  88<L>  |  36<H>  ----------------------------<  599<HH>  5.4<H>   |  22  |  4.73<H>    Ca    8.4      12 Feb 2019 04:08  Ca    8.7      12 Feb 2019 01:38    TPro  7.0  /  Alb  4.5  /  TBili  0.4  /  DBili  x   /  AST  44<H>  /  ALT  43  /  AlkPhos  88  02-11  TPro  7.0  /  Alb  4.3  /  TBili  0.4  /  DBili  x   /  AST  46<H>  /  ALT  39  /  AlkPhos  90  02-11      proBNP: Serum Pro-Brain Natriuretic Peptide: >28360 pg/mL (02-12 @ 01:38)  Serum Pro-Brain Natriuretic Peptide: >07754 pg/mL (02-11 @ 19:58)    	  EKG: NSR, nonspecific ST/T changes    Assessment:  - DKA  -lactic acidosis  -elevated cardiac enzymes  -CHF, volume overload. acute on chronic systolic HF  -possible PNA  -ischemic cardiomyopathy, cad s/p multiple stents  -esrd on hd  -PAD s/p prior intervention   -remote TIA      Recs:  -appreciate renal recs. s/p HD. currently appears euvolemic  -DKA resolved. f/u endo recs  -no true ischemic sx. ekg with nonspecific ST changes. elevated hs-trop with negative delta. would defer ischemic work up for now until acute issues resolve. will consider ischemic evaluation prior to pending thoracic surgery  -hx of prolonged qtc. avoid qtc prolonging meds  -CT chest findings concerning for malignancy and possible left lower lobe pna. follows with dr drake and dr michael. scheduled for surgery at [facility] in 2 weeks   -c/w asa, plavix, statin for ischemic cardiomyopathy/CAD/PAD if not contraindicated  -c/w toprol and hydralazine for ischemic cardiomyopathy and HTN   -dvt ppx          Greater than 60 minutes spent on total encounter; more than 50% of the visit was spent counseling and/or coordinating care by the attending physician.   	  Julián Biswas MD   Cardiovascular Diseases  [phone]

## 2019-02-12 NOTE — H&P ADULT - NSHPPHYSICALEXAM_GEN_ALL_CORE
Vital Signs Last 24 Hrs  T(C): 36.8 (12 Feb 2019 00:34), Max: 36.9 (11 Feb 2019 22:10)  T(F): 98.2 (12 Feb 2019 00:34), Max: 98.4 (11 Feb 2019 22:10)  HR: 98 (12 Feb 2019 00:34) (81 - 98)  BP: 129/69 (12 Feb 2019 00:34) (126/57 - 152/83)  BP(mean): --  RR: 18 (12 Feb 2019 00:34) (16 - 18)  SpO2: 100% (12 Feb 2019 00:34) (92% - 100%)    GENERAL: No acute distress, well-developed  HEAD:  Atraumatic, Normocephalic  EYES: EOMI, PERRLA, conjunctiva and sclera clear  ENT: Oral mucosa moist  NECK: Neck supple  CHEST/LUNG: Rales in LLL  HEART: Regular rate and rhythm; No murmurs, rubs, or gallops  ABDOMEN: Soft, Nontender, Nondistended; Bowel sounds present  EXTREMITIES:  No clubbing, cyanosis, or edema  VASCULAR: Posterior tibialis pulses intact bilaterally  PSYCH: Normal behavior, normal affect  NEUROLOGY: AAOx3  SKIN: grossly warm and dry

## 2019-02-12 NOTE — H&P ADULT - PROBLEM SELECTOR PLAN 4
Pending HD in AM  Nephrology consult appreciated  Renally dose medications. Pending HD in AM  Nephrology consult appreciated  Renally dose medications.  Pt with elevated troponin, slightly uptrending but in setting of ESRD and CHF and infection. Will recheck troponin T @ 8AM.  Will need cardiology consult with patient's cardiologist Dr. Christianson @ 8AM who saw her during last admission as well.   EKG and presentation make ACS less likely.

## 2019-02-12 NOTE — H&P ADULT - HISTORY OF PRESENT ILLNESS
Patient is a 62 yo F with ESRD (on HD M/Tu/Th/Sa), type 1 DM, CAD, HFrEF (EF 50% on TTE from 10/18), TIA, and PVD, recent admission here in late January for DKA with septic shock from unclear source and s/p MICU stay during that admission for pressor support and insulin gtt, now presenting with acute shortness of breath x 1 day associated with productive cough x 3 days. Patient is a 64 yo F with ESRD (on HD M/Tu/Th/Sa), type 1 DM, CAD, HFrEF (EF 50% on TTE from 10/18), TIA, and PVD, recent admission here in late January for DKA with septic shock from unclear source and s/p MICU stay during that admission for pressor support and insulin gtt, now presenting with acute shortness of breath x 1 day associated with productive cough x 3 days. Patient reports that 3 days ago she began to have a frequent cough productive of yellowish sputum that she attributed to a cold. No sore throat. Minimal runny nose at that time. No fevers. Chronic chills unchanged for many years. Patient went to HD yesterday and had session without issue. However upon returning home took a nap which she frequently does after HD. After she awoke from nap patient felt dyspneic. Dypsnea did not seem to be positional or related to exertion. Patient with chronic and unchanged LLE swelling which she has undergone dopplers for in the past. Because of her SOB she decided to seek medical care here. Patient reports taking her insulins today though did not take evening long acting insulin prior to coming to hospital. Denies abdominal pain or nausea/vomiting. Reports several episodes of watery diarrhea that started after getting to the hospital and receiving antibiotics, no diarrhea prior to that. Currently diarrhea has stopped. Makes minimal amounts of urine. Patient is a 62 yo F with ESRD (on HD M/Tu/Th/Sa), type 1 DM, CAD, HFrEF (EF 50% on TTE from 10/18), TIA, and PVD, recent admission here in late January for DKA with septic shock from unclear source found to have possible lung malignancy on imaging (septal thickening) and s/p MICU stay during that admission for pressor support and insulin gtt, now presenting with acute shortness of breath x 1 day associated with productive cough x 3 days. Patient reports that 3 days ago she began to have a frequent cough productive of yellowish sputum that she attributed to a cold. No sore throat. Minimal runny nose at that time. No fevers. Chronic chills unchanged for many years. Patient went to HD yesterday and had session without issue. However upon returning home took a nap which she frequently does after HD. After she awoke from nap patient felt dyspneic. Dypsnea did not seem to be positional or related to exertion. Patient with chronic and unchanged LLE swelling which she has undergone dopplers for in the past. Because of her SOB she decided to seek medical care here. Patient reports taking her insulins today though did not take evening long acting insulin prior to coming to hospital. Denies abdominal pain or nausea/vomiting. Reports several episodes of watery diarrhea that started after getting to the hospital and receiving antibiotics, no diarrhea prior to that. Currently diarrhea has stopped. Makes minimal amounts of urine.     Patient denies any recent episodes of choking or coughing triggered by eating. Reports that she wears dentures to eat and swallows without issue .

## 2019-02-12 NOTE — H&P ADULT - PROBLEM SELECTOR PLAN 2
Appearance most consistent with aspiration pneumonia though patient denies specific aspiration episodes.  Check speech and swallow  Soft nectar thick CC dash diet for now.  Will cover for oral anaerobic organisms with zosyn. Given recent hospitalization will also give vancomycin, azithromycin for broad coverage  Check urine legionella  Monitor for further diarrhea-if further episodes will need to check GI panel and C diff PCR, currently improved  Start probiotics  Given ESRD dose vancomycin by level with serum level monitoring

## 2019-02-13 LAB
ANION GAP SERPL CALC-SCNC: 16 MMOL/L — SIGNIFICANT CHANGE UP (ref 5–17)
BUN SERPL-MCNC: 22 MG/DL — SIGNIFICANT CHANGE UP (ref 7–23)
CALCIUM SERPL-MCNC: 7.7 MG/DL — LOW (ref 8.4–10.5)
CHLORIDE SERPL-SCNC: 94 MMOL/L — LOW (ref 96–108)
CO2 SERPL-SCNC: 26 MMOL/L — SIGNIFICANT CHANGE UP (ref 22–31)
CREAT SERPL-MCNC: 3.35 MG/DL — HIGH (ref 0.5–1.3)
GLUCOSE BLDC GLUCOMTR-MCNC: 118 MG/DL — HIGH (ref 70–99)
GLUCOSE BLDC GLUCOMTR-MCNC: 139 MG/DL — HIGH (ref 70–99)
GLUCOSE BLDC GLUCOMTR-MCNC: 269 MG/DL — HIGH (ref 70–99)
GLUCOSE BLDC GLUCOMTR-MCNC: 77 MG/DL — SIGNIFICANT CHANGE UP (ref 70–99)
GLUCOSE SERPL-MCNC: 121 MG/DL — HIGH (ref 70–99)
HBA1C BLD-MCNC: 7.6 % — HIGH (ref 4–5.6)
HCT VFR BLD CALC: 31.8 % — LOW (ref 34.5–45)
HGB BLD-MCNC: 9.7 G/DL — LOW (ref 11.5–15.5)
LACTATE SERPL-SCNC: 1.5 MMOL/L — SIGNIFICANT CHANGE UP (ref 0.7–2)
MCHC RBC-ENTMCNC: 30.5 GM/DL — LOW (ref 32–36)
MCHC RBC-ENTMCNC: 32.8 PG — SIGNIFICANT CHANGE UP (ref 27–34)
MCV RBC AUTO: 107.4 FL — HIGH (ref 80–100)
PLATELET # BLD AUTO: 302 K/UL — SIGNIFICANT CHANGE UP (ref 150–400)
POTASSIUM SERPL-MCNC: 5 MMOL/L — SIGNIFICANT CHANGE UP (ref 3.5–5.3)
POTASSIUM SERPL-SCNC: 5 MMOL/L — SIGNIFICANT CHANGE UP (ref 3.5–5.3)
RBC # BLD: 2.96 M/UL — LOW (ref 3.8–5.2)
RBC # FLD: 15.7 % — HIGH (ref 10.3–14.5)
SODIUM SERPL-SCNC: 136 MMOL/L — SIGNIFICANT CHANGE UP (ref 135–145)
VANCOMYCIN TROUGH SERPL-MCNC: 9.5 UG/ML — LOW (ref 10–20)
WBC # BLD: 4.92 K/UL — SIGNIFICANT CHANGE UP (ref 3.8–10.5)
WBC # FLD AUTO: 4.92 K/UL — SIGNIFICANT CHANGE UP (ref 3.8–10.5)

## 2019-02-13 PROCEDURE — 99233 SBSQ HOSP IP/OBS HIGH 50: CPT

## 2019-02-13 PROCEDURE — 99232 SBSQ HOSP IP/OBS MODERATE 35: CPT

## 2019-02-13 RX ORDER — OXYCODONE HYDROCHLORIDE 5 MG/1
2.5 TABLET ORAL ONCE
Qty: 0 | Refills: 0 | Status: DISCONTINUED | OUTPATIENT
Start: 2019-02-13 | End: 2019-02-13

## 2019-02-13 RX ORDER — INSULIN LISPRO 100/ML
VIAL (ML) SUBCUTANEOUS AT BEDTIME
Qty: 0 | Refills: 0 | Status: DISCONTINUED | OUTPATIENT
Start: 2019-02-13 | End: 2019-02-21

## 2019-02-13 RX ORDER — ACETAMINOPHEN 500 MG
1000 TABLET ORAL ONCE
Qty: 0 | Refills: 0 | Status: COMPLETED | OUTPATIENT
Start: 2019-02-13 | End: 2019-02-13

## 2019-02-13 RX ORDER — INSULIN LISPRO 100/ML
2 VIAL (ML) SUBCUTANEOUS ONCE
Qty: 0 | Refills: 0 | Status: COMPLETED | OUTPATIENT
Start: 2019-02-13 | End: 2019-02-13

## 2019-02-13 RX ADMIN — AZITHROMYCIN 250 MILLIGRAM(S): 500 TABLET, FILM COATED ORAL at 22:37

## 2019-02-13 RX ADMIN — CINACALCET 60 MILLIGRAM(S): 30 TABLET, FILM COATED ORAL at 12:47

## 2019-02-13 RX ADMIN — PIPERACILLIN AND TAZOBACTAM 25 GRAM(S): 4; .5 INJECTION, POWDER, LYOPHILIZED, FOR SOLUTION INTRAVENOUS at 17:45

## 2019-02-13 RX ADMIN — Medication 100 MILLIGRAM(S): at 05:32

## 2019-02-13 RX ADMIN — DULOXETINE HYDROCHLORIDE 60 MILLIGRAM(S): 30 CAPSULE, DELAYED RELEASE ORAL at 12:47

## 2019-02-13 RX ADMIN — Medication 100 MILLIGRAM(S): at 21:59

## 2019-02-13 RX ADMIN — Medication 7 UNIT(S): at 21:59

## 2019-02-13 RX ADMIN — Medication 50 MILLIGRAM(S): at 05:32

## 2019-02-13 RX ADMIN — Medication 3 UNIT(S): at 12:47

## 2019-02-13 RX ADMIN — ATORVASTATIN CALCIUM 20 MILLIGRAM(S): 80 TABLET, FILM COATED ORAL at 21:58

## 2019-02-13 RX ADMIN — Medication 81 MILLIGRAM(S): at 12:46

## 2019-02-13 RX ADMIN — OXYCODONE HYDROCHLORIDE 2.5 MILLIGRAM(S): 5 TABLET ORAL at 23:40

## 2019-02-13 RX ADMIN — Medication 1000 MILLIGRAM(S): at 20:29

## 2019-02-13 RX ADMIN — PIPERACILLIN AND TAZOBACTAM 25 GRAM(S): 4; .5 INJECTION, POWDER, LYOPHILIZED, FOR SOLUTION INTRAVENOUS at 05:32

## 2019-02-13 RX ADMIN — Medication 100 MILLIGRAM(S): at 14:25

## 2019-02-13 RX ADMIN — Medication 1: at 21:59

## 2019-02-13 RX ADMIN — SEVELAMER CARBONATE 800 MILLIGRAM(S): 2400 POWDER, FOR SUSPENSION ORAL at 12:47

## 2019-02-13 RX ADMIN — Medication 400 MILLIGRAM(S): at 20:19

## 2019-02-13 RX ADMIN — SEVELAMER CARBONATE 800 MILLIGRAM(S): 2400 POWDER, FOR SUSPENSION ORAL at 17:45

## 2019-02-13 NOTE — PROGRESS NOTE ADULT - SUBJECTIVE AND OBJECTIVE BOX
PULMONARY PROGRESS NOTE    NATHAN MEEKS  MRN-[id number]    Patient is a 63y old  Female who presents with a chief complaint of DKA/Hyperkalemia/Pneumonia (12 Feb 2019 12:32)      HPI:  says cough is improving, upset at need for swallow eval     ROS:   -no N/V    MEDICATIONS  (STANDING):  aspirin enteric coated 81 milliGRAM(s) Oral daily  atorvastatin 20 milliGRAM(s) Oral at bedtime  azithromycin  IVPB 500 milliGRAM(s) IV Intermittent every 24 hours  cinacalcet 60 milliGRAM(s) Oral daily  dextrose 5%. 1000 milliLiter(s) (50 mL/Hr) IV Continuous <Continuous>  dextrose 50% Injectable 12.5 Gram(s) IV Push once  dextrose 50% Injectable 25 Gram(s) IV Push once  dextrose 50% Injectable 25 Gram(s) IV Push once  DULoxetine 60 milliGRAM(s) Oral daily  heparin  Injectable 5000 Unit(s) SubCutaneous every 12 hours  hydrALAZINE 100 milliGRAM(s) Oral every 8 hours  insulin detemir injectable (LEVEMIR) 7 Unit(s) SubCutaneous at bedtime  insulin lispro (HumaLOG) corrective regimen sliding scale   SubCutaneous three times a day before meals  insulin lispro (HumaLOG) corrective regimen sliding scale   SubCutaneous at bedtime  insulin lispro Injectable (HumaLOG) 3 Unit(s) SubCutaneous before breakfast  insulin lispro Injectable (HumaLOG) 3 Unit(s) SubCutaneous before lunch  insulin lispro Injectable (HumaLOG) 3 Unit(s) SubCutaneous before dinner  metoprolol succinate ER 50 milliGRAM(s) Oral daily  piperacillin/tazobactam IVPB. 3.375 Gram(s) IV Intermittent every 12 hours  sevelamer hydrochloride 800 milliGRAM(s) Oral three times a day with meals    MEDICATIONS  (PRN):  dextrose 40% Gel 15 Gram(s) Oral once PRN Blood Glucose LESS THAN 70 milliGRAM(s)/deciliter  glucagon  Injectable 1 milliGRAM(s) IntraMuscular once PRN Glucose LESS THAN 70 milligrams/deciliter      EXAM:  Vital Signs Last 24 Hrs  T(C): 37.1 (13 Feb 2019 11:37), Max: 37.1 (13 Feb 2019 11:37)  T(F): 98.7 (13 Feb 2019 11:37), Max: 98.7 (13 Feb 2019 11:37)  HR: 80 (13 Feb 2019 14:23) (75 - 89)  BP: 123/71 (13 Feb 2019 14:23) (100/63 - 147/80)  BP(mean): --  RR: 18 (13 Feb 2019 14:23) (18 - 18)  SpO2: 96% (13 Feb 2019 14:23) (96% - 98%)    GENERAL: The patient is awake and alert in no apparent distress.     LUNGS: faint exp wheeze bilat    HEART: S1/S2    LABS/IMAGING: reviewed                        9.7    4.92  )-----------( 302      ( 13 Feb 2019 08:42 )             31.8   02-13    136  |  94<L>  |  22  ----------------------------<  121<H>  5.0   |  26  |  3.35<H>    Ca    7.7<L>      13 Feb 2019 07:10    TPro  7.0  /  Alb  4.5  /  TBili  0.4  /  DBili  x   /  AST  44<H>  /  ALT  43  /  AlkPhos  88  02-11      < from: CT Angio Chest w/ IV Cont (02.11.19 @ 20:37) >  IMPRESSION:   No pulmonary embolism.    Debris/secretions within the trachea and left main bronchus. Scattered   patchy/groundglassleft lower lobe opacities may be infectious in   etiology. This constellation of findings can be seen in the setting of   aspiration.    Unilateral interlobular septal thickening on the right as seen on the   prior CT of the chest dated 1/23/2019. Lymphangitic spread of malignancy   cannot be excluded.    Right paratracheal, subcarinal and hilar lymphadenopathy, overall not   significant change from 1/23/2019.    Consider PET/CT for further evaluation.    < end of copied text >      PROBLEM LIST:  63y Female with HEALTH ISSUES - PROBLEM Dx:  Type 1 diabetes mellitus with diabetic peripheral angiopathy without gangrene: Type 1 diabetes mellitus with diabetic peripheral angiopathy without gangrene  ESRD (end stage renal disease): ESRD (end stage renal disease)  Hyperkalemia: Hyperkalemia  Pneumonia of left lower lobe due to infectious organism: Pneumonia of left lower lobe due to infectious organism  Diabetic ketoacidosis without coma associated with type 1 diabetes mellitus: Diabetic ketoacidosis without coma associated with type 1 diabetes mellitus    RECS:  -HCAP coverage  -swallow eval  -for thoracic surgery in 2 weeks for abnormal ct  -hd per renal    Alem Tate MD   [phone]

## 2019-02-13 NOTE — DISCUSSION/SUMMARY
[FreeTextEntry1] : s/p NS hospital discharge\par Admitted for DKA\par History Renal Failure on HD \par  CHF ASHD \par \par Reported new CT Chest finding\par 1/2019 CT CHEST - \par R Hilar mass inc largest  diameter 2.8 compared Oct 31 2018\par New unilateral interlobular septa thickening right\par No change b/l grd glass opacities\par New small Right pleural effusion with trace left  effusion\par R paratracheal subcarinal adenopathy with slight interval enlargement\par Enlarged PA r/o Pulmonary HTN\par \par 11/1/2018 CT CHEST compared 1/2018 grd glass opacities as  noted without change\par No change R paratracheal LN 1.9  cm\par \par Did see CTS Sept 2018  and  felt  with stable  grd glass nodules opacities in very high  risk patient to continue observation\par \par F/.U although DM schedule PET CT and discuss Bronch EUBUS\par Off Plavix\par Referred back to Dr. Baldwin cardiothoracic surgery for biopsy procedures\par We will check CBC platelet count PT PTT for completeness\par \par ADDENDUM\par Admission [place] diagnosis DKA hyperkalemia pneumonia\par Admission February 12, 2019 prior to admission had a productive course and more dyspnea for the prior 2 days CT angios from February 11, 2019 tracheal and left main bronchus secretions scattered patchy groundglass left lower lobe opacities felt to be possibly infectious rule out aspiration\par Interlobular thickening unilateral cannot exclude lymphangitic spread of malignancy\par Right paratracheal subcarinal and hilar adenopathy without change\par \par Diabetes mellitus with diabetic peripheral angiopathy without gangrene\par End-stage renal disease on dialysis\par Status post hyperkalemic kalemia\par Rule out left lower lobe pneumonia\par DKA without coma\par It has been scheduled for a lung biopsy for tissue diagnosis to rule out lung cancer with Dr. Florentinocardiothoracic surgery this month

## 2019-02-13 NOTE — SWALLOW BEDSIDE ASSESSMENT ADULT - COMMENTS
hx cont'd: Dypsnea did not seem to be positional or related to exertion. Patient with chronic and unchanged LLE swelling which she has undergone dopplers for in the past. Because of her SOB she decided to seek medical care here. Patient reports taking her insulins today though did not take evening long acting insulin prior to coming to hospital. Denies abdominal pain or nausea/vomiting. Reports several episodes of watery diarrhea that started after getting to the hospital and receiving antibiotics, no diarrhea prior to that. Currently diarrhea has stopped. Makes minimal amounts of urine. Patient denies any recent episodes of choking or coughing triggered by eating. Reports that she wears dentures to eat and swallows without issue.  Informed by RN that pt's FS is 535.  Being followed by cardiology, endo, and nephrology.  As per pulm: HCAP coverage; swallow eval; for thoracic surgery in 2 weeks for abnormal ct; hd per renal.  CT chest: No pulmonary embolism. Debris/secretions within the trachea and left main bronchus. Scattered patchy/groundglass left lower lobe opacities may be infectious in etiology. This constellation of findings can be seen in the setting of aspiration. Unilateral interlobular septal thickening on the right as seen on the prior CT of the chest dated 1/23/2019. Lymphangitic spread of malignancy cannot be excluded. Right paratracheal, subcarinal and hilar lymphadenopathy, overall not significant change from 1/23/2019. Consider PET/CT for further evaluation.

## 2019-02-13 NOTE — SWALLOW BEDSIDE ASSESSMENT ADULT - SLP GENERAL OBSERVATIONS
Patient encountered asleep in bed,  present at bedside, +2L/NC.  Easily roused to auditory stimuli, A&Ox3.  Positioned upright for evaluation purposes.  Pt denied hx of dysphagia, but endorsed hx of PNA (1.5 years ago & approx. 3 weeks ago after previous hospitalization).  Denied coughing/choking during PO intake.  Stated she usually does not wear dentures unless consuming very hard solids.  Vocal quality judged to be WNL.  Pt able to follow commands for evaluation purposes and responded to questions appropriately.  Reported dislike of nectar thickened liquids.  Ginger ale noted at bedside despite current diet order of dysphagia III with nectar thick liquids.

## 2019-02-13 NOTE — HISTORY OF PRESENT ILLNESS
[None] : ~He/She~ has no significant interval events [Difficulty Breathing During Exertion] : stable dyspnea on exertion [Feelings Of Weakness On Exertion] : stable exercise intolerance [Cough] : denies coughing [Wheezing] : denies wheezing [Regional Soft Tissue Swelling Both Lower Extremities] : stable lower extremity edema [Chest Pain Or Discomfort] : denies chest pain [Fever] : denies fever [Former] : is a former smoker [___ Year Quit] : ~He/She~ quit smoking in [unfilled] [Wt Gain ___ Lbs] : no recent weight gain [Wt Loss ___ Lbs] : no recent weight loss [Oxygen] : the patient uses no supplemental oxygen [FreeTextEntry1] : Severe CHF\par ASHD\par \par dominant RUL 1.3 cm Pulmonary Nodule\par \par COPD

## 2019-02-13 NOTE — REASON FOR VISIT
[Follow-Up] : a follow-up visit [Abnormal CT Scan] : abnormal CT Scan [COPD] : COPD [FreeTextEntry2] : Pulmonary Nodule

## 2019-02-13 NOTE — SWALLOW BEDSIDE ASSESSMENT ADULT - SLP PRECAUTIONS/LIMITATIONS: VISION
impaired/Partially impaired: cannot see medication labels or newsprint, but can see obstacles in path, and the surrounding layout; can count fingers at arm's length; uses eyeglasses

## 2019-02-13 NOTE — SWALLOW BEDSIDE ASSESSMENT ADULT - ADDITIONAL RECOMMENDATIONS
Maintain good oral hygiene.  Please re-consult this service if pt willing to participate in instrumental swallow assessment.

## 2019-02-13 NOTE — REVIEW OF SYSTEMS
[As Noted in HPI] : as noted in HPI [Edema] : ~T edema was present [Claudication] : intermittent claudication [Diabetes] : diabetes mellitus [Negative] : Neurologic [Chest Discomfort] : no chest discomfort [PND] : no PND [Orthopnea] : no orthopnea [FreeTextEntry9] : CHF ASHD [de-identified] : s/p DKA hospital admission

## 2019-02-13 NOTE — PROGRESS NOTE ADULT - SUBJECTIVE AND OBJECTIVE BOX
[facility]   [phone]  RENAL FOLLOW UP NOTE  --------------------------------------------------------------------------------  Chief Complaint:    24 hour events/subjective:    states feels weak overall   no worsening sob     PAST HISTORY  --------------------------------------------------------------------------------  No significant changes to PMH, PSH, FHx, SHx, unless otherwise noted    ALLERGIES & MEDICATIONS  --------------------------------------------------------------------------------  Allergies    No Known Allergies    Intolerances      Standing Inpatient Medications  aspirin enteric coated 81 milliGRAM(s) Oral daily  atorvastatin 20 milliGRAM(s) Oral at bedtime  azithromycin  IVPB 500 milliGRAM(s) IV Intermittent every 24 hours  cinacalcet 60 milliGRAM(s) Oral daily  dextrose 5%. 1000 milliLiter(s) IV Continuous <Continuous>  dextrose 50% Injectable 12.5 Gram(s) IV Push once  dextrose 50% Injectable 25 Gram(s) IV Push once  dextrose 50% Injectable 25 Gram(s) IV Push once  DULoxetine 60 milliGRAM(s) Oral daily  heparin  Injectable 5000 Unit(s) SubCutaneous every 12 hours  hydrALAZINE 100 milliGRAM(s) Oral every 8 hours  insulin detemir injectable (LEVEMIR) 7 Unit(s) SubCutaneous at bedtime  insulin lispro (HumaLOG) corrective regimen sliding scale   SubCutaneous three times a day before meals  insulin lispro (HumaLOG) corrective regimen sliding scale   SubCutaneous at bedtime  insulin lispro Injectable (HumaLOG) 3 Unit(s) SubCutaneous before breakfast  insulin lispro Injectable (HumaLOG) 3 Unit(s) SubCutaneous before lunch  insulin lispro Injectable (HumaLOG) 3 Unit(s) SubCutaneous before dinner  metoprolol succinate ER 50 milliGRAM(s) Oral daily  piperacillin/tazobactam IVPB. 3.375 Gram(s) IV Intermittent every 12 hours  sevelamer hydrochloride 800 milliGRAM(s) Oral three times a day with meals    PRN Inpatient Medications  dextrose 40% Gel 15 Gram(s) Oral once PRN  glucagon  Injectable 1 milliGRAM(s) IntraMuscular once PRN      REVIEW OF SYSTEMS  --------------------------------------------------------------------------------    Gen: denies fevers/chills,  CVS: denies chest pain/palpitations  Resp: denies SOB/Cough +   GI: Denies N/V/Abd pain  : Denies dysuria/oliguria/hematuria    All other systems were reviewed and are negative, except as noted.    VITALS/PHYSICAL EXAM  --------------------------------------------------------------------------------  T(C): 36.3 (02-13-19 @ 20:02), Max: 37.1 (02-13-19 @ 11:37)  HR: 102 (02-13-19 @ 20:02) (75 - 102)  BP: 128/77 (02-13-19 @ 20:02) (100/63 - 139/74)  RR: 18 (02-13-19 @ 20:02) (18 - 18)  SpO2: 100% (02-13-19 @ 20:02) (96% - 100%)  Wt(kg): --        02-12-19 @ 07:01  -  02-13-19 @ 07:00  --------------------------------------------------------  IN: 720 mL / OUT: 2000 mL / NET: -1280 mL    02-13-19 @ 07:01  -  02-13-19 @ 20:20  --------------------------------------------------------  IN: 400 mL / OUT: 0 mL / NET: 400 mL      Physical Exam:  	  		  Gen: Non toxic ill appearing muscle wasting   	no jvd , supple neck,   	Pulm: decrease bs  no rales or ronchi or wheezing  	CV: RRR, S1S2; no rub  	Abd: +BS, soft, nontender/nondistended  	: No suprapubic tenderness  	UE: Warm, no cyanosis  no clubbing,  no edema; no asterixis  	LE: Warm, no cyanosis  no clubbing, 2 + pitting edema    	  LABS/STUDIES  --------------------------------------------------------------------------------              9.7    4.92  >-----------<  302      [02-13-19 @ 08:42]              31.8     136  |  94  |  22  ----------------------------<  121      [02-13-19 @ 07:10]  5.0   |  26  |  3.35        Ca     7.7     [02-13-19 @ 07:10]    TPro  7.0  /  Alb  4.5  /  TBili  0.4  /  DBili  x   /  AST  44  /  ALT  43  /  AlkPhos  88  [02-11-19 @ 23:21]          Creatinine Trend:  SCr 3.35 [02-13 @ 07:10]  SCr 5.09 [02-12 @ 11:01]  SCr 4.92 [02-12 @ 04:08]  SCr 4.73 [02-12 @ 01:38]  SCr 4.67 [02-11 @ 23:21]                  PTH -- (Ca 8.3)      [03-03-18 @ 08:53]   134  HbA1c 7.6      [02-13-19 @ 08:42]  TSH 0.71      [11-17-18 @ 08:25]  Lipid: chol 113, TG 86, HDL 59, LDL 37      [04-30-18 @ 06:44]

## 2019-02-13 NOTE — PROGRESS NOTE ADULT - SUBJECTIVE AND OBJECTIVE BOX
Diabetes Follow up note:  Interval Hx:  64 yo woman with uncontrolled type 1 diabetes (well known from prior admissions) w/ESRD on HD, neuropathy, CAD, CVA, CHF w/ very labile blood sugars, non-adherent to diet at home with micro and macrovascular complications admitted for shortness of breath and pneumonia on IV abx. Pt had glucose spike to >300 after not receiving dinner premeal last night. Improved on current basal/bolus regimen today. Pt seen at bedside. Reports fair appetite. Awaiting new lunch tray as she didn't like first meal provided. Reports is scheduled for thoracic surgery in 2 weeks but unsure if plan is to do it sooner.       Review of Systems:  General: "I couldn't breathe when I came in"  GI: Tolerating POs without any N/V/D/ABD PAIN.  CV: No CP/SOB  ENDO: No S&Sx of hypoglycemia  MEDS:  atorvastatin 20 milliGRAM(s) Oral at bedtime  cinacalcet 60 milliGRAM(s) Oral daily    insulin detemir injectable (LEVEMIR) 7 Unit(s) SubCutaneous at bedtime  insulin lispro (HumaLOG) corrective regimen sliding scale   SubCutaneous three times a day before meals  insulin lispro (HumaLOG) corrective regimen sliding scale   SubCutaneous at bedtime  insulin lispro Injectable (HumaLOG) 3 Unit(s) SubCutaneous before breakfast  insulin lispro Injectable (HumaLOG) 3 Unit(s) SubCutaneous before lunch  insulin lispro Injectable (HumaLOG) 3 Unit(s) SubCutaneous before dinner    azithromycin  IVPB 500 milliGRAM(s) IV Intermittent every 24 hours  piperacillin/tazobactam IVPB. 3.375 Gram(s) IV Intermittent every 12 hours    Allergies    No Known Allergies        PE:  General: Female sitting in chair. NAD.   Vital Signs Last 24 Hrs  T(C): 37.1 (13 Feb 2019 11:37), Max: 37.1 (13 Feb 2019 11:37)  T(F): 98.7 (13 Feb 2019 11:37), Max: 98.7 (13 Feb 2019 11:37)  HR: 78 (13 Feb 2019 11:37) (75 - 89)  BP: 114/67 (13 Feb 2019 11:37) (100/63 - 147/80)  BP(mean): --  RR: 18 (13 Feb 2019 11:37) (18 - 18)  SpO2: 96% (13 Feb 2019 11:37) (96% - 99%)  Abd: Soft, NT,ND,   Extremities: Warm. L AV fistula in place.   Neuro: A&O X3    LABS:    POCT Blood Glucose.: 139 mg/dL (02-13-19 @ 12:02)  POCT Blood Glucose.: 118 mg/dL (02-13-19 @ 07:29)  POCT Blood Glucose.: 311 mg/dL (02-12-19 @ 23:38)  POCT Blood Glucose.: 363 mg/dL (02-12-19 @ 21:01)  POCT Blood Glucose.: 143 mg/dL (02-12-19 @ 16:40)  POCT Blood Glucose.: 123 mg/dL (02-12-19 @ 14:18)  POCT Blood Glucose.: 150 mg/dL (02-12-19 @ 08:39)  POCT Blood Glucose.: 68 mg/dL (02-12-19 @ 08:17)  POCT Blood Glucose.: 60 mg/dL (02-12-19 @ 07:58)  POCT Blood Glucose.: 60 mg/dL (02-12-19 @ 07:58)  POCT Blood Glucose.: 161 mg/dL (02-12-19 @ 06:11)  POCT Blood Glucose.: 459 mg/dL (02-12-19 @ 03:29)  POCT Blood Glucose.: 538 mg/dL (02-12-19 @ 02:29)  POCT Blood Glucose.: 535 mg/dL (02-12-19 @ 00:55)  POCT Blood Glucose.: 498 mg/dL (02-12-19 @ 00:53)  POCT Blood Glucose.: 463 mg/dL (02-11-19 @ 21:17)                            9.7    4.92  )-----------( 302      ( 13 Feb 2019 08:42 )             31.8       02-13    136  |  94<L>  |  22  ----------------------------<  121<H>  5.0   |  26  |  3.35<H>    Ca    7.7<L>      13 Feb 2019 07:10    TPro  7.0  /  Alb  4.5  /  TBili  0.4  /  DBili  x   /  AST  44<H>  /  ALT  43  /  AlkPhos  88  02-11        Hemoglobin A1C, Whole Blood: 7.6 % <H> [4.0 - 5.6] (02-13-19 @ 08:42)  Hemoglobin A1C, Whole Blood: 8.6 % <H> [4.0 - 5.6] (11-16-18 @ 20:14)            Contact number: isabel [phone] or [phone]

## 2019-02-13 NOTE — SWALLOW BEDSIDE ASSESSMENT ADULT - SLP PERTINENT HISTORY OF CURRENT PROBLEM
64 yo F with ESRD (on HD M/Tu/Th/Sa), type 1 DM, CAD, HFrEF (EF 50% on TTE from 10/18), TIA, and PVD, recent admission here in late January for DKA with septic shock from unclear source found to have possible lung malignancy on imaging (septal thickening) and s/p MICU stay during that admission for pressor support and insulin gtt, now presenting with acute shortness of breath x 1 day associated with productive cough x 3 days. Patient reports that 3 days ago she began to have a frequent cough productive of yellowish sputum that she attributed to a cold. No sore throat. Minimal runny nose at that time. No fevers. Chronic chills unchanged for many years. Patient went to HD yesterday and had session without issue. However upon returning home took a nap which she frequently does after HD. After she awoke from nap patient felt dyspneic.

## 2019-02-13 NOTE — PROCEDURE
[FreeTextEntry1] : \par \par Chest x-ray PA lateral projection January 11, 2019\par Cardiomegaly\par Left pleural effusion pleural fibrosis thickening\par Right lung upper zone rib fracture\par No evidence of active CHF\par No Tasha B-lines cephalization\par No dominant pulmonary nodules appreciated\par \par PFT January 11, 2019\par Moderate reduction in flow rates with an obstructive impairment\par Lung volumes consistent with air trapping with RV/TLC ratio 152% of predicted.\par Diffusion 68% of predicted with a loss of mild functioning alveolar capillary units\par Chronically low hemoglobin noted today at 8.9\par \par Flow  volume loop February 1, 2019 severe reduction in flow rates with FEV1 840 cc or 36% of predicted

## 2019-02-13 NOTE — PROVIDER CONTACT NOTE (OTHER) - ASSESSMENT
Pt A&Ox4. VSS. LE is not swollen or red. nonpitting edema. Left lower extremity is more swollen than the right. +2 palpable pedal pulses.

## 2019-02-13 NOTE — SWALLOW BEDSIDE ASSESSMENT ADULT - SWALLOW EVAL: DIAGNOSIS
Patient presents with prolonged mastication of regular solids likely partially related to lack of dentition/refusal to wear dentures resulting in expectoration of pieces of bolus.  Trigger of the swallow judged to be timely and complete oral clearance obtained post swallow.  Laryngeal elevation judged to be reduced upon palpation.  No overt s/s of laryngeal penetration/aspiration noted, however cannot r/o silent aspiration at bedside.

## 2019-02-13 NOTE — SWALLOW BEDSIDE ASSESSMENT ADULT - ASR SWALLOW RECOMMEND DIAG
VFSS/MBS/Recommending MBS to r/o aspiration given CT findings of "Debris/secretions within the trachea and left main bronchus."  As per discussion with NP Janis pt is refusing to participate in further dysphagia w/u stating she will eat "what she wants" regardless of exam findings.  This service to remain available if pt becomes amendable to further dysphagia w/u.

## 2019-02-13 NOTE — PROGRESS NOTE ADULT - SUBJECTIVE AND OBJECTIVE BOX
Patient is a 63y old  Female who presents with a chief complaint of DKA/Hyperkalemia/Pneumonia (13 Feb 2019 14:32)      SUBJECTIVE / OVERNIGHT EVENTS: feels better  Review of Systems  chest pain no  palpitations no  sob improving   nausea no  headache no    MEDICATIONS  (STANDING):  aspirin enteric coated 81 milliGRAM(s) Oral daily  atorvastatin 20 milliGRAM(s) Oral at bedtime  azithromycin  IVPB 500 milliGRAM(s) IV Intermittent every 24 hours  cinacalcet 60 milliGRAM(s) Oral daily  dextrose 5%. 1000 milliLiter(s) (50 mL/Hr) IV Continuous <Continuous>  dextrose 50% Injectable 12.5 Gram(s) IV Push once  dextrose 50% Injectable 25 Gram(s) IV Push once  dextrose 50% Injectable 25 Gram(s) IV Push once  DULoxetine 60 milliGRAM(s) Oral daily  heparin  Injectable 5000 Unit(s) SubCutaneous every 12 hours  hydrALAZINE 100 milliGRAM(s) Oral every 8 hours  insulin detemir injectable (LEVEMIR) 7 Unit(s) SubCutaneous at bedtime  insulin lispro (HumaLOG) corrective regimen sliding scale   SubCutaneous three times a day before meals  insulin lispro (HumaLOG) corrective regimen sliding scale   SubCutaneous at bedtime  insulin lispro Injectable (HumaLOG) 3 Unit(s) SubCutaneous before breakfast  insulin lispro Injectable (HumaLOG) 3 Unit(s) SubCutaneous before lunch  insulin lispro Injectable (HumaLOG) 3 Unit(s) SubCutaneous before dinner  metoprolol succinate ER 50 milliGRAM(s) Oral daily  piperacillin/tazobactam IVPB. 3.375 Gram(s) IV Intermittent every 12 hours  sevelamer hydrochloride 800 milliGRAM(s) Oral three times a day with meals    MEDICATIONS  (PRN):  dextrose 40% Gel 15 Gram(s) Oral once PRN Blood Glucose LESS THAN 70 milliGRAM(s)/deciliter  glucagon  Injectable 1 milliGRAM(s) IntraMuscular once PRN Glucose LESS THAN 70 milligrams/deciliter      Vital Signs Last 24 Hrs  T(C): 37.1 (13 Feb 2019 11:37), Max: 37.1 (13 Feb 2019 11:37)  T(F): 98.7 (13 Feb 2019 11:37), Max: 98.7 (13 Feb 2019 11:37)  HR: 80 (13 Feb 2019 14:23) (75 - 89)  BP: 123/71 (13 Feb 2019 14:23) (100/63 - 147/80)  BP(mean): --  RR: 18 (13 Feb 2019 14:23) (18 - 18)  SpO2: 96% (13 Feb 2019 14:23) (96% - 98%)    PHYSICAL EXAM:  GENERAL: NAD, well-developed  HEAD:  Atraumatic, Normocephalic  EYES: EOMI, PERRLA, conjunctiva and sclera clear  NECK: Supple, No JVD  CHEST/LUNG: few crackles to auscultation bilaterally; No wheeze  HEART: Regular rate and rhythm; No murmurs, rubs, or gallops  ABDOMEN: Soft, Nontender, Nondistended; Bowel sounds present  EXTREMITIES:  2+bipedal edema  PSYCH: AAOx3  NEUROLOGY: non-focal  SKIN: No rashes or lesions    LABS:                        9.7    4.92  )-----------( 302      ( 13 Feb 2019 08:42 )             31.8     02-13    136  |  94<L>  |  22  ----------------------------<  121<H>  5.0   |  26  |  3.35<H>    Ca    7.7<L>      13 Feb 2019 07:10    TPro  7.0  /  Alb  4.5  /  TBili  0.4  /  DBili  x   /  AST  44<H>  /  ALT  43  /  AlkPhos  88  02-11    PT/INR - ( 11 Feb 2019 19:58 )   PT: 12.3 sec;   INR: 1.08 ratio         PTT - ( 11 Feb 2019 19:58 )  PTT:30.7 sec            RADIOLOGY & ADDITIONAL TESTS:    Imaging Personally Reviewed:    Consultant(s) Notes Reviewed:      Care Discussed with Consultants/Other Providers:

## 2019-02-13 NOTE — SWALLOW BEDSIDE ASSESSMENT ADULT - ASR SWALLOW ASPIRATION MONITOR
pneumonia/upper respiratory infection/throat clearing/cough/gurgly voice/change of breathing pattern/fever

## 2019-02-14 LAB
ANION GAP SERPL CALC-SCNC: 18 MMOL/L — HIGH (ref 5–17)
BUN SERPL-MCNC: 30 MG/DL — HIGH (ref 7–23)
CALCIUM SERPL-MCNC: 7.1 MG/DL — LOW (ref 8.4–10.5)
CHLORIDE SERPL-SCNC: 91 MMOL/L — LOW (ref 96–108)
CO2 SERPL-SCNC: 24 MMOL/L — SIGNIFICANT CHANGE UP (ref 22–31)
CREAT SERPL-MCNC: 4.63 MG/DL — HIGH (ref 0.5–1.3)
GLUCOSE BLDC GLUCOMTR-MCNC: 149 MG/DL — HIGH (ref 70–99)
GLUCOSE BLDC GLUCOMTR-MCNC: 183 MG/DL — HIGH (ref 70–99)
GLUCOSE BLDC GLUCOMTR-MCNC: 88 MG/DL — SIGNIFICANT CHANGE UP (ref 70–99)
GLUCOSE BLDC GLUCOMTR-MCNC: 89 MG/DL — SIGNIFICANT CHANGE UP (ref 70–99)
GLUCOSE SERPL-MCNC: 89 MG/DL — SIGNIFICANT CHANGE UP (ref 70–99)
HCT VFR BLD CALC: 31.1 % — LOW (ref 34.5–45)
HGB BLD-MCNC: 9.5 G/DL — LOW (ref 11.5–15.5)
MCHC RBC-ENTMCNC: 30.5 GM/DL — LOW (ref 32–36)
MCHC RBC-ENTMCNC: 31.7 PG — SIGNIFICANT CHANGE UP (ref 27–34)
MCV RBC AUTO: 103.7 FL — HIGH (ref 80–100)
PLATELET # BLD AUTO: 273 K/UL — SIGNIFICANT CHANGE UP (ref 150–400)
POTASSIUM SERPL-MCNC: 5.3 MMOL/L — SIGNIFICANT CHANGE UP (ref 3.5–5.3)
POTASSIUM SERPL-SCNC: 5.3 MMOL/L — SIGNIFICANT CHANGE UP (ref 3.5–5.3)
RBC # BLD: 3 M/UL — LOW (ref 3.8–5.2)
RBC # FLD: 15.8 % — HIGH (ref 10.3–14.5)
SODIUM SERPL-SCNC: 133 MMOL/L — LOW (ref 135–145)
WBC # BLD: 4.52 K/UL — SIGNIFICANT CHANGE UP (ref 3.8–10.5)
WBC # FLD AUTO: 4.52 K/UL — SIGNIFICANT CHANGE UP (ref 3.8–10.5)

## 2019-02-14 PROCEDURE — 99232 SBSQ HOSP IP/OBS MODERATE 35: CPT

## 2019-02-14 PROCEDURE — 93970 EXTREMITY STUDY: CPT | Mod: 26

## 2019-02-14 PROCEDURE — 99233 SBSQ HOSP IP/OBS HIGH 50: CPT

## 2019-02-14 RX ORDER — INSULIN DETEMIR 100/ML (3)
6 INSULIN PEN (ML) SUBCUTANEOUS AT BEDTIME
Qty: 0 | Refills: 0 | Status: DISCONTINUED | OUTPATIENT
Start: 2019-02-14 | End: 2019-02-15

## 2019-02-14 RX ORDER — CALCIUM GLUCONATE 100 MG/ML
1 VIAL (ML) INTRAVENOUS ONCE
Qty: 0 | Refills: 0 | Status: COMPLETED | OUTPATIENT
Start: 2019-02-14 | End: 2019-02-14

## 2019-02-14 RX ORDER — OXYCODONE AND ACETAMINOPHEN 5; 325 MG/1; MG/1
1 TABLET ORAL ONCE
Qty: 0 | Refills: 0 | Status: DISCONTINUED | OUTPATIENT
Start: 2019-02-14 | End: 2019-02-14

## 2019-02-14 RX ADMIN — Medication 100 MILLIGRAM(S): at 14:41

## 2019-02-14 RX ADMIN — CINACALCET 60 MILLIGRAM(S): 30 TABLET, FILM COATED ORAL at 12:52

## 2019-02-14 RX ADMIN — ATORVASTATIN CALCIUM 20 MILLIGRAM(S): 80 TABLET, FILM COATED ORAL at 22:16

## 2019-02-14 RX ADMIN — Medication 81 MILLIGRAM(S): at 12:53

## 2019-02-14 RX ADMIN — Medication 3 UNIT(S): at 17:35

## 2019-02-14 RX ADMIN — DULOXETINE HYDROCHLORIDE 60 MILLIGRAM(S): 30 CAPSULE, DELAYED RELEASE ORAL at 12:52

## 2019-02-14 RX ADMIN — SEVELAMER CARBONATE 800 MILLIGRAM(S): 2400 POWDER, FOR SUSPENSION ORAL at 08:05

## 2019-02-14 RX ADMIN — Medication 50 MILLIGRAM(S): at 12:52

## 2019-02-14 RX ADMIN — OXYCODONE AND ACETAMINOPHEN 1 TABLET(S): 5; 325 TABLET ORAL at 20:46

## 2019-02-14 RX ADMIN — OXYCODONE AND ACETAMINOPHEN 1 TABLET(S): 5; 325 TABLET ORAL at 21:46

## 2019-02-14 RX ADMIN — PIPERACILLIN AND TAZOBACTAM 25 GRAM(S): 4; .5 INJECTION, POWDER, LYOPHILIZED, FOR SOLUTION INTRAVENOUS at 17:35

## 2019-02-14 RX ADMIN — OXYCODONE HYDROCHLORIDE 2.5 MILLIGRAM(S): 5 TABLET ORAL at 00:30

## 2019-02-14 RX ADMIN — SEVELAMER CARBONATE 800 MILLIGRAM(S): 2400 POWDER, FOR SUSPENSION ORAL at 17:35

## 2019-02-14 RX ADMIN — Medication 400 GRAM(S): at 20:15

## 2019-02-14 RX ADMIN — AZITHROMYCIN 250 MILLIGRAM(S): 500 TABLET, FILM COATED ORAL at 22:30

## 2019-02-14 RX ADMIN — PIPERACILLIN AND TAZOBACTAM 25 GRAM(S): 4; .5 INJECTION, POWDER, LYOPHILIZED, FOR SOLUTION INTRAVENOUS at 05:52

## 2019-02-14 RX ADMIN — Medication 6 UNIT(S): at 22:54

## 2019-02-14 RX ADMIN — Medication 100 MILLIGRAM(S): at 22:16

## 2019-02-14 NOTE — PROGRESS NOTE ADULT - SUBJECTIVE AND OBJECTIVE BOX
Cardiovascular Disease Progress Note    Overnight events: No acute events overnight.  awaiting speech and swallow eval. sob improved on abx. c/o LLE pain  Otherwise review of systems negative    Objective Findings:  T(C): 36.4 (02-14-19 @ 04:05), Max: 37.1 (02-13-19 @ 11:37)  HR: 78 (02-14-19 @ 08:03) (75 - 102)  BP: 145/77 (02-14-19 @ 08:03) (114/67 - 145/77)  RR: 18 (02-14-19 @ 08:03) (18 - 18)  SpO2: 99% (02-14-19 @ 08:03) (96% - 100%)  Wt(kg): --  Daily     Daily       Physical Exam:  Gen: NAD  HEENT: EOMI  CV: RRR, normal S1 + S2, no m/r/g  Lungs: CTAB  Abd: soft, non-tender  Ext: No edema        Laboratory Data:                        9.7    4.92  )-----------( 302      ( 13 Feb 2019 08:42 )             31.8     02-13    136  |  94<L>  |  22  ----------------------------<  121<H>  5.0   |  26  |  3.35<H>    Ca    7.7<L>      13 Feb 2019 07:10                Inpatient Medications:  MEDICATIONS  (STANDING):  aspirin enteric coated 81 milliGRAM(s) Oral daily  atorvastatin 20 milliGRAM(s) Oral at bedtime  azithromycin  IVPB 500 milliGRAM(s) IV Intermittent every 24 hours  cinacalcet 60 milliGRAM(s) Oral daily  dextrose 5%. 1000 milliLiter(s) (50 mL/Hr) IV Continuous <Continuous>  dextrose 50% Injectable 12.5 Gram(s) IV Push once  dextrose 50% Injectable 25 Gram(s) IV Push once  dextrose 50% Injectable 25 Gram(s) IV Push once  DULoxetine 60 milliGRAM(s) Oral daily  heparin  Injectable 5000 Unit(s) SubCutaneous every 12 hours  hydrALAZINE 100 milliGRAM(s) Oral every 8 hours  insulin detemir injectable (LEVEMIR) 7 Unit(s) SubCutaneous at bedtime  insulin lispro (HumaLOG) corrective regimen sliding scale   SubCutaneous three times a day before meals  insulin lispro (HumaLOG) corrective regimen sliding scale   SubCutaneous at bedtime  insulin lispro Injectable (HumaLOG) 3 Unit(s) SubCutaneous before breakfast  insulin lispro Injectable (HumaLOG) 3 Unit(s) SubCutaneous before lunch  insulin lispro Injectable (HumaLOG) 3 Unit(s) SubCutaneous before dinner  metoprolol succinate ER 50 milliGRAM(s) Oral daily  piperacillin/tazobactam IVPB. 3.375 Gram(s) IV Intermittent every 12 hours  sevelamer hydrochloride 800 milliGRAM(s) Oral three times a day with meals      Assessment:  - DKA  -lactic acidosis  -elevated cardiac enzymes  -CHF, volume overload. acute on chronic systolic HF  -possible PNA  -ischemic cardiomyopathy, cad s/p multiple stents  -esrd on hd  -PAD s/p prior intervention   -remote TIA      Recs:  -appreciate renal recs. s/p HD. currently appears euvolemic  -DKA resolved. f/u endo recs  -no true ischemic sx. ekg with nonspecific ST changes. elevated hs-trop with negative delta. would defer ischemic work up for now  -hx of prolonged qtc. avoid qtc prolonging meds  -LLE pain --> f/u venous duplex  -CT chest findings concerning for malignancy and possible left lower lobe pna. follows with dr drake and dr michael. scheduled for surgery at [facility] in 2 weeks   -c/w asa, plavix, statin for ischemic cardiomyopathy/CAD/PAD if not contraindicated  -c/w toprol and hydralazine for ischemic cardiomyopathy and HTN   -dvt ppx        Over 25 minutes spent on total encounter; more than 50% of the visit was spent counseling and/or coordinating care by the attending physician.      Julián Biswas MD   Cardiovascular Disease  [phone]

## 2019-02-14 NOTE — PROGRESS NOTE ADULT - SUBJECTIVE AND OBJECTIVE BOX
Patient is a 63y old  Female who presents with a chief complaint of DKA/Hyperkalemia/Pneumonia (14 Feb 2019 13:00)      SUBJECTIVE / OVERNIGHT EVENTS: feels better  Review of Systems  chest pain no  palpitations no  sob no  nausea no  headache no    MEDICATIONS  (STANDING):  aspirin enteric coated 81 milliGRAM(s) Oral daily  atorvastatin 20 milliGRAM(s) Oral at bedtime  azithromycin  IVPB 500 milliGRAM(s) IV Intermittent every 24 hours  cinacalcet 60 milliGRAM(s) Oral daily  dextrose 5%. 1000 milliLiter(s) (50 mL/Hr) IV Continuous <Continuous>  dextrose 50% Injectable 12.5 Gram(s) IV Push once  dextrose 50% Injectable 25 Gram(s) IV Push once  dextrose 50% Injectable 25 Gram(s) IV Push once  DULoxetine 60 milliGRAM(s) Oral daily  heparin  Injectable 5000 Unit(s) SubCutaneous every 12 hours  hydrALAZINE 100 milliGRAM(s) Oral every 8 hours  insulin detemir injectable (LEVEMIR) 6 Unit(s) SubCutaneous at bedtime  insulin lispro (HumaLOG) corrective regimen sliding scale   SubCutaneous three times a day before meals  insulin lispro (HumaLOG) corrective regimen sliding scale   SubCutaneous at bedtime  insulin lispro Injectable (HumaLOG) 3 Unit(s) SubCutaneous before breakfast  insulin lispro Injectable (HumaLOG) 3 Unit(s) SubCutaneous before lunch  insulin lispro Injectable (HumaLOG) 3 Unit(s) SubCutaneous before dinner  metoprolol succinate ER 50 milliGRAM(s) Oral daily  piperacillin/tazobactam IVPB. 3.375 Gram(s) IV Intermittent every 12 hours  sevelamer hydrochloride 800 milliGRAM(s) Oral three times a day with meals    MEDICATIONS  (PRN):  dextrose 40% Gel 15 Gram(s) Oral once PRN Blood Glucose LESS THAN 70 milliGRAM(s)/deciliter  glucagon  Injectable 1 milliGRAM(s) IntraMuscular once PRN Glucose LESS THAN 70 milligrams/deciliter      Vital Signs Last 24 Hrs  T(C): 36.6 (14 Feb 2019 12:51), Max: 36.8 (14 Feb 2019 08:30)  T(F): 97.8 (14 Feb 2019 12:51), Max: 98.2 (14 Feb 2019 08:30)  HR: 80 (14 Feb 2019 12:51) (75 - 102)  BP: 160/72 (14 Feb 2019 12:51) (118/72 - 160/72)  BP(mean): --  RR: 18 (14 Feb 2019 12:51) (18 - 18)  SpO2: 100% (14 Feb 2019 12:51) (98% - 100%)    PHYSICAL EXAM:  GENERAL: NAD  HEAD:  Atraumatic, Normocephalic  EYES: EOMI, PERRLA, conjunctiva and sclera clear  NECK: Supple, No JVD  CHEST/LUNG: few crackles  to auscultation bilaterally; No wheeze  HEART: Regular rate and rhythm; No murmurs, rubs, or gallops  ABDOMEN: Soft, Nontender, Nondistended; Bowel sounds present  EXTREMITIES:  1+ bipedal edema  PSYCH: AAOx3  NEUROLOGY: non-focal  SKIN: No rashes or lesions    LABS:                        9.5    4.52  )-----------( 273      ( 14 Feb 2019 10:39 )             31.1     02-14    133<L>  |  91<L>  |  30<H>  ----------------------------<  89  5.3   |  24  |  4.63<H>    Ca    7.1<L>      14 Feb 2019 09:29                  RADIOLOGY & ADDITIONAL TESTS:    Imaging Personally Reviewed:    Consultant(s) Notes Reviewed:      Care Discussed with Consultants/Other Providers:

## 2019-02-14 NOTE — PROGRESS NOTE ADULT - SUBJECTIVE AND OBJECTIVE BOX
[facility]   [phone]  DIALYSIS NOTE  Chief Complaint: ESRD/Ongoing hemodialysis requirement. seen on hd earlier     24 hour events/subjective:      states feels cold. cough is better       ALLERGIES & MEDICATIONS  --------------------------------------------------------------------------------  Allergies    No Known Allergies    Intolerances      Standing Inpatient Medications  aspirin enteric coated 81 milliGRAM(s) Oral daily  atorvastatin 20 milliGRAM(s) Oral at bedtime  azithromycin  IVPB 500 milliGRAM(s) IV Intermittent every 24 hours  cinacalcet 60 milliGRAM(s) Oral daily  dextrose 5%. 1000 milliLiter(s) IV Continuous <Continuous>  dextrose 50% Injectable 12.5 Gram(s) IV Push once  dextrose 50% Injectable 25 Gram(s) IV Push once  dextrose 50% Injectable 25 Gram(s) IV Push once  DULoxetine 60 milliGRAM(s) Oral daily  heparin  Injectable 5000 Unit(s) SubCutaneous every 12 hours  hydrALAZINE 100 milliGRAM(s) Oral every 8 hours  insulin detemir injectable (LEVEMIR) 6 Unit(s) SubCutaneous at bedtime  insulin lispro (HumaLOG) corrective regimen sliding scale   SubCutaneous three times a day before meals  insulin lispro (HumaLOG) corrective regimen sliding scale   SubCutaneous at bedtime  insulin lispro Injectable (HumaLOG) 3 Unit(s) SubCutaneous before breakfast  insulin lispro Injectable (HumaLOG) 3 Unit(s) SubCutaneous before lunch  insulin lispro Injectable (HumaLOG) 3 Unit(s) SubCutaneous before dinner  metoprolol succinate ER 50 milliGRAM(s) Oral daily  piperacillin/tazobactam IVPB. 3.375 Gram(s) IV Intermittent every 12 hours  sevelamer hydrochloride 800 milliGRAM(s) Oral three times a day with meals    PRN Inpatient Medications  dextrose 40% Gel 15 Gram(s) Oral once PRN  glucagon  Injectable 1 milliGRAM(s) IntraMuscular once PRN      REVIEW OF SYSTEMS  --------------------------------------------------------------------------------  no itching or rash  no fever or chill  no cp or palp   +sob- cough   no N/V/D/ no abd pain           VITALS/PHYSICAL EXAM  --------------------------------------------------------------------------------  T(C): 36.6 (02-14-19 @ 12:51), Max: 36.8 (02-14-19 @ 08:30)  HR: 77 (02-14-19 @ 14:39) (75 - 102)  BP: 139/73 (02-14-19 @ 14:39) (118/72 - 160/72)  RR: 18 (02-14-19 @ 14:39) (18 - 18)  SpO2: 99% (02-14-19 @ 14:39) (98% - 100%)  Wt(kg): --        02-13-19 @ 07:01  -  02-14-19 @ 07:00  --------------------------------------------------------  IN: 1380 mL / OUT: 0 mL / NET: 1380 mL    02-14-19 @ 07:01  -  02-14-19 @ 15:28  --------------------------------------------------------  IN: 720 mL / OUT: 2200 mL / NET: -1480 mL      Physical Exam:  	  Gen: Non toxic ill appearing muscle wasting   	no jvd , supple neck,   	Pulm: decrease bs  no rales or ronchi or wheezing  	CV: RRR, S1S2; no rub  	Abd: +BS, soft, nontender/nondistended  	: No suprapubic tenderness  	UE: Warm, no cyanosis  no clubbing,  no edema; no asterixis  	LE: Warm, no cyanosis  no clubbing, 2 + pitting edema	    	  LABS/STUDIES  --------------------------------------------------------------------------------              9.5    4.52  >-----------<  273      [02-14-19 @ 10:39]              31.1     133  |  91  |  30  ----------------------------<  89      [02-14-19 @ 09:29]  5.3   |  24  |  4.63        Ca     7.1     [02-14-19 @ 09:29]

## 2019-02-14 NOTE — PROGRESS NOTE ADULT - SUBJECTIVE AND OBJECTIVE BOX
PULMONARY PROGRESS NOTE    NATHAN MEEKS  [id number]    Patient is a 63y old  Female who presents with a chief complaint of DKA/Hyperkalemia/Pneumonia (12 Feb 2019 12:32)      HPI:  says cough is improving, no sob, tired    ROS:   -no N/V    MEDICATIONS  (STANDING):  aspirin enteric coated 81 milliGRAM(s) Oral daily  atorvastatin 20 milliGRAM(s) Oral at bedtime  azithromycin  IVPB 500 milliGRAM(s) IV Intermittent every 24 hours  cinacalcet 60 milliGRAM(s) Oral daily  dextrose 5%. 1000 milliLiter(s) (50 mL/Hr) IV Continuous <Continuous>  dextrose 50% Injectable 12.5 Gram(s) IV Push once  dextrose 50% Injectable 25 Gram(s) IV Push once  dextrose 50% Injectable 25 Gram(s) IV Push once  DULoxetine 60 milliGRAM(s) Oral daily  heparin  Injectable 5000 Unit(s) SubCutaneous every 12 hours  hydrALAZINE 100 milliGRAM(s) Oral every 8 hours  insulin detemir injectable (LEVEMIR) 7 Unit(s) SubCutaneous at bedtime  insulin lispro (HumaLOG) corrective regimen sliding scale   SubCutaneous three times a day before meals  insulin lispro (HumaLOG) corrective regimen sliding scale   SubCutaneous at bedtime  insulin lispro Injectable (HumaLOG) 3 Unit(s) SubCutaneous before breakfast  insulin lispro Injectable (HumaLOG) 3 Unit(s) SubCutaneous before lunch  insulin lispro Injectable (HumaLOG) 3 Unit(s) SubCutaneous before dinner  metoprolol succinate ER 50 milliGRAM(s) Oral daily  piperacillin/tazobactam IVPB. 3.375 Gram(s) IV Intermittent every 12 hours  sevelamer hydrochloride 800 milliGRAM(s) Oral three times a day with meals    MEDICATIONS  (PRN):  dextrose 40% Gel 15 Gram(s) Oral once PRN Blood Glucose LESS THAN 70 milliGRAM(s)/deciliter  glucagon  Injectable 1 milliGRAM(s) IntraMuscular once PRN Glucose LESS THAN 70 milligrams/deciliter          EXAM:  Vital Signs Last 24 Hrs  T(C): 36.8 (14 Feb 2019 08:30), Max: 37.1 (13 Feb 2019 11:37)  T(F): 98.2 (14 Feb 2019 08:30), Max: 98.7 (13 Feb 2019 11:37)  HR: 77 (14 Feb 2019 08:30) (75 - 102)  BP: 137/67 (14 Feb 2019 08:30) (114/67 - 145/77)  BP(mean): --  RR: 18 (14 Feb 2019 08:30) (18 - 18)  SpO2: 100% (14 Feb 2019 08:30) (96% - 100%)    GENERAL: The patient is awake and alert in no apparent distress.   S1/S2    LABS/IMAGING: reviewed                        9.7    4.92  )-----------( 302      ( 13 Feb 2019 08:42 )             31.8   02-14    133<L>  |  91<L>  |  30<H>  ----------------------------<  89  5.3   |  24  |  4.63<H>    Ca    7.1<L>      14 Feb 2019 09:29        < from: CT Angio Chest w/ IV Cont (02.11.19 @ 20:37) >  IMPRESSION:   No pulmonary embolism.    Debris/secretions within the trachea and left main bronchus. Scattered   patchy/groundglassleft lower lobe opacities may be infectious in   etiology. This constellation of findings can be seen in the setting of   aspiration.    Unilateral interlobular septal thickening on the right as seen on the   prior CT of the chest dated 1/23/2019. Lymphangitic spread of malignancy   cannot be excluded.    Right paratracheal, subcarinal and hilar lymphadenopathy, overall not   significant change from 1/23/2019.    Consider PET/CT for further evaluation.    < end of copied text >      PROBLEM LIST:  63y Female with HEALTH ISSUES - PROBLEM Dx:  Type 1 diabetes mellitus with diabetic peripheral angiopathy without gangrene: Type 1 diabetes mellitus with diabetic peripheral angiopathy without gangrene  ESRD (end stage renal disease): ESRD (end stage renal disease)  Hyperkalemia: Hyperkalemia  Pneumonia of left lower lobe due to infectious organism: Pneumonia of left lower lobe due to infectious organism  Diabetic ketoacidosis without coma associated with type 1 diabetes mellitus: Diabetic ketoacidosis without coma associated with type 1 diabetes mellitus    RECS:  -HCAP coverage, would check urine legionella ag, if neg would dc azithro.  Complete 7 days abx total.  -swallow eval  -for thoracic surgery in 2 weeks for abnormal ct  -hd per renal    Alem Tate MD   [phone]

## 2019-02-14 NOTE — PROGRESS NOTE ADULT - SUBJECTIVE AND OBJECTIVE BOX
Diabetes Follow up note:  Interval Hx:  64 yo woman with uncontrolled type 1 diabetes (well known from prior admissions) w/ESRD on HD, neuropathy, CAD, CVA, CHF w/ very labile blood sugars, non-adherent to diet at home with micro and macrovascular complications admitted for shortness of breath and pneumonia on IV abx. Pt seen after HD session at bedside. BG values in 80s today. Reports waiting for  to come to bring her lunch. Feels better overall.       Review of Systems:  General: "I feel ok"  GI: Tolerating POs without any N/V/D/ABD PAIN.  CV: No CP/SOB  ENDO: No S&Sx of hypoglycemia  MEDS:  atorvastatin 20 milliGRAM(s) Oral at bedtime  cinacalcet 60 milliGRAM(s) Oral daily    insulin detemir injectable (LEVEMIR) 7 Unit(s) SubCutaneous at bedtime  insulin lispro (HumaLOG) corrective regimen sliding scale   SubCutaneous three times a day before meals  insulin lispro (HumaLOG) corrective regimen sliding scale   SubCutaneous at bedtime  insulin lispro Injectable (HumaLOG) 3 Unit(s) SubCutaneous before breakfast  insulin lispro Injectable (HumaLOG) 3 Unit(s) SubCutaneous before lunch  insulin lispro Injectable (HumaLOG) 3 Unit(s) SubCutaneous before dinner    azithromycin  IVPB 500 milliGRAM(s) IV Intermittent every 24 hours  piperacillin/tazobactam IVPB. 3.375 Gram(s) IV Intermittent every 12 hours    Allergies    No Known Allergies        PE:  General: Female sitting in bed. NAD.   Vital Signs Last 24 Hrs  T(C): 36.6 (14 Feb 2019 12:51), Max: 36.8 (14 Feb 2019 08:30)  T(F): 97.8 (14 Feb 2019 12:51), Max: 98.2 (14 Feb 2019 08:30)  HR: 80 (14 Feb 2019 12:51) (75 - 102)  BP: 160/72 (14 Feb 2019 12:51) (118/72 - 160/72)  BP(mean): --  RR: 18 (14 Feb 2019 12:51) (18 - 18)  SpO2: 100% (14 Feb 2019 12:51) (96% - 100%)  Abd: Soft, NT,ND,   Extremities: Warm. no edema. L AV fistula in place.   Neuro: A&O X3    LABS:    POCT Blood Glucose.: 88 mg/dL (02-14-19 @ 12:47)  POCT Blood Glucose.: 89 mg/dL (02-14-19 @ 07:28)  POCT Blood Glucose.: 269 mg/dL (02-13-19 @ 21:09)  POCT Blood Glucose.: 77 mg/dL (02-13-19 @ 17:02)  POCT Blood Glucose.: 139 mg/dL (02-13-19 @ 12:02)  POCT Blood Glucose.: 118 mg/dL (02-13-19 @ 07:29)  POCT Blood Glucose.: 311 mg/dL (02-12-19 @ 23:38)  POCT Blood Glucose.: 363 mg/dL (02-12-19 @ 21:01)  POCT Blood Glucose.: 143 mg/dL (02-12-19 @ 16:40)  POCT Blood Glucose.: 123 mg/dL (02-12-19 @ 14:18)  POCT Blood Glucose.: 150 mg/dL (02-12-19 @ 08:39)  POCT Blood Glucose.: 68 mg/dL (02-12-19 @ 08:17)  POCT Blood Glucose.: 60 mg/dL (02-12-19 @ 07:58)  POCT Blood Glucose.: 60 mg/dL (02-12-19 @ 07:58)  POCT Blood Glucose.: 161 mg/dL (02-12-19 @ 06:11)  POCT Blood Glucose.: 459 mg/dL (02-12-19 @ 03:29)  POCT Blood Glucose.: 538 mg/dL (02-12-19 @ 02:29)  POCT Blood Glucose.: 535 mg/dL (02-12-19 @ 00:55)  POCT Blood Glucose.: 498 mg/dL (02-12-19 @ 00:53)  POCT Blood Glucose.: 463 mg/dL (02-11-19 @ 21:17)                            9.5    4.52  )-----------( 273      ( 14 Feb 2019 10:39 )             31.1       02-14    133<L>  |  91<L>  |  30<H>  ----------------------------<  89  5.3   |  24  |  4.63<H>    Ca    7.1<L>      14 Feb 2019 09:29          Hemoglobin A1C, Whole Blood: 7.6 % <H> [4.0 - 5.6] (02-13-19 @ 08:42)  Hemoglobin A1C, Whole Blood: 8.6 % <H> [4.0 - 5.6] (11-16-18 @ 20:14)            Contact number: isabel [phone] or [phone]

## 2019-02-15 DIAGNOSIS — E10.649 TYPE 1 DIABETES MELLITUS WITH HYPOGLYCEMIA WITHOUT COMA: ICD-10-CM

## 2019-02-15 LAB
ANION GAP SERPL CALC-SCNC: 14 MMOL/L — SIGNIFICANT CHANGE UP (ref 5–17)
ANION GAP SERPL CALC-SCNC: 18 MMOL/L — HIGH (ref 5–17)
BUN SERPL-MCNC: 17 MG/DL — SIGNIFICANT CHANGE UP (ref 7–23)
BUN SERPL-MCNC: 17 MG/DL — SIGNIFICANT CHANGE UP (ref 7–23)
CALCIUM SERPL-MCNC: 7.3 MG/DL — LOW (ref 8.4–10.5)
CALCIUM SERPL-MCNC: 7.5 MG/DL — LOW (ref 8.4–10.5)
CHLORIDE SERPL-SCNC: 92 MMOL/L — LOW (ref 96–108)
CHLORIDE SERPL-SCNC: 92 MMOL/L — LOW (ref 96–108)
CK MB BLD-MCNC: 2.1 % — SIGNIFICANT CHANGE UP (ref 0–3.5)
CK MB CFR SERPL CALC: 2.4 NG/ML — SIGNIFICANT CHANGE UP (ref 0–3.8)
CK SERPL-CCNC: 113 U/L — SIGNIFICANT CHANGE UP (ref 25–170)
CO2 SERPL-SCNC: 24 MMOL/L — SIGNIFICANT CHANGE UP (ref 22–31)
CO2 SERPL-SCNC: 27 MMOL/L — SIGNIFICANT CHANGE UP (ref 22–31)
CREAT SERPL-MCNC: 3.74 MG/DL — HIGH (ref 0.5–1.3)
CREAT SERPL-MCNC: 3.94 MG/DL — HIGH (ref 0.5–1.3)
GLUCOSE BLDC GLUCOMTR-MCNC: 108 MG/DL — HIGH (ref 70–99)
GLUCOSE BLDC GLUCOMTR-MCNC: 160 MG/DL — HIGH (ref 70–99)
GLUCOSE BLDC GLUCOMTR-MCNC: 228 MG/DL — HIGH (ref 70–99)
GLUCOSE BLDC GLUCOMTR-MCNC: 32 MG/DL — CRITICAL LOW (ref 70–99)
GLUCOSE BLDC GLUCOMTR-MCNC: 34 MG/DL — CRITICAL LOW (ref 70–99)
GLUCOSE BLDC GLUCOMTR-MCNC: 99 MG/DL — SIGNIFICANT CHANGE UP (ref 70–99)
GLUCOSE SERPL-MCNC: 102 MG/DL — HIGH (ref 70–99)
GLUCOSE SERPL-MCNC: 41 MG/DL — CRITICAL LOW (ref 70–99)
HCT VFR BLD CALC: 31.7 % — LOW (ref 34.5–45)
HGB BLD-MCNC: 9.6 G/DL — LOW (ref 11.5–15.5)
MCHC RBC-ENTMCNC: 30.3 GM/DL — LOW (ref 32–36)
MCHC RBC-ENTMCNC: 32.1 PG — SIGNIFICANT CHANGE UP (ref 27–34)
MCV RBC AUTO: 106 FL — HIGH (ref 80–100)
PLATELET # BLD AUTO: 293 K/UL — SIGNIFICANT CHANGE UP (ref 150–400)
POTASSIUM SERPL-MCNC: 4.1 MMOL/L — SIGNIFICANT CHANGE UP (ref 3.5–5.3)
POTASSIUM SERPL-MCNC: 4.6 MMOL/L — SIGNIFICANT CHANGE UP (ref 3.5–5.3)
POTASSIUM SERPL-SCNC: 4.1 MMOL/L — SIGNIFICANT CHANGE UP (ref 3.5–5.3)
POTASSIUM SERPL-SCNC: 4.6 MMOL/L — SIGNIFICANT CHANGE UP (ref 3.5–5.3)
RBC # BLD: 2.99 M/UL — LOW (ref 3.8–5.2)
RBC # FLD: 15.5 % — HIGH (ref 10.3–14.5)
SODIUM SERPL-SCNC: 133 MMOL/L — LOW (ref 135–145)
SODIUM SERPL-SCNC: 134 MMOL/L — LOW (ref 135–145)
TROPONIN T, HIGH SENSITIVITY RESULT: 253 NG/L — HIGH (ref 0–51)
TROPONIN T, HIGH SENSITIVITY RESULT: 272 NG/L — HIGH (ref 0–51)
WBC # BLD: 4.14 K/UL — SIGNIFICANT CHANGE UP (ref 3.8–10.5)
WBC # FLD AUTO: 4.14 K/UL — SIGNIFICANT CHANGE UP (ref 3.8–10.5)

## 2019-02-15 PROCEDURE — 93010 ELECTROCARDIOGRAM REPORT: CPT | Mod: 76

## 2019-02-15 PROCEDURE — 99233 SBSQ HOSP IP/OBS HIGH 50: CPT

## 2019-02-15 PROCEDURE — 99232 SBSQ HOSP IP/OBS MODERATE 35: CPT

## 2019-02-15 RX ORDER — INSULIN LISPRO 100/ML
2 VIAL (ML) SUBCUTANEOUS
Qty: 0 | Refills: 0 | Status: DISCONTINUED | OUTPATIENT
Start: 2019-02-16 | End: 2019-02-20

## 2019-02-15 RX ORDER — INSULIN DETEMIR 100/ML (3)
5 INSULIN PEN (ML) SUBCUTANEOUS AT BEDTIME
Qty: 0 | Refills: 0 | Status: DISCONTINUED | OUTPATIENT
Start: 2019-02-15 | End: 2019-02-19

## 2019-02-15 RX ORDER — INSULIN LISPRO 100/ML
2 VIAL (ML) SUBCUTANEOUS
Qty: 0 | Refills: 0 | Status: DISCONTINUED | OUTPATIENT
Start: 2019-02-15 | End: 2019-02-20

## 2019-02-15 RX ORDER — DEXTROSE 50 % IN WATER 50 %
25 SYRINGE (ML) INTRAVENOUS ONCE
Qty: 0 | Refills: 0 | Status: COMPLETED | OUTPATIENT
Start: 2019-02-15 | End: 2019-02-15

## 2019-02-15 RX ORDER — OXYCODONE AND ACETAMINOPHEN 5; 325 MG/1; MG/1
1 TABLET ORAL ONCE
Qty: 0 | Refills: 0 | Status: DISCONTINUED | OUTPATIENT
Start: 2019-02-15 | End: 2019-02-15

## 2019-02-15 RX ORDER — OXYCODONE HYDROCHLORIDE 5 MG/1
2.5 TABLET ORAL ONCE
Qty: 0 | Refills: 0 | Status: DISCONTINUED | OUTPATIENT
Start: 2019-02-15 | End: 2019-02-15

## 2019-02-15 RX ORDER — NITROGLYCERIN 6.5 MG
0.4 CAPSULE, EXTENDED RELEASE ORAL
Qty: 0 | Refills: 0 | Status: COMPLETED | OUTPATIENT
Start: 2019-02-15 | End: 2019-02-15

## 2019-02-15 RX ADMIN — Medication 25 GRAM(S): at 11:40

## 2019-02-15 RX ADMIN — CINACALCET 60 MILLIGRAM(S): 30 TABLET, FILM COATED ORAL at 11:18

## 2019-02-15 RX ADMIN — SEVELAMER CARBONATE 800 MILLIGRAM(S): 2400 POWDER, FOR SUSPENSION ORAL at 11:18

## 2019-02-15 RX ADMIN — Medication 0.4 MILLIGRAM(S): at 16:03

## 2019-02-15 RX ADMIN — Medication 100 MILLIGRAM(S): at 06:02

## 2019-02-15 RX ADMIN — Medication 0.4 MILLIGRAM(S): at 16:17

## 2019-02-15 RX ADMIN — PIPERACILLIN AND TAZOBACTAM 25 GRAM(S): 4; .5 INJECTION, POWDER, LYOPHILIZED, FOR SOLUTION INTRAVENOUS at 06:02

## 2019-02-15 RX ADMIN — Medication 0.4 MILLIGRAM(S): at 16:09

## 2019-02-15 RX ADMIN — Medication 100 MILLIGRAM(S): at 14:10

## 2019-02-15 RX ADMIN — AZITHROMYCIN 250 MILLIGRAM(S): 500 TABLET, FILM COATED ORAL at 21:26

## 2019-02-15 RX ADMIN — SEVELAMER CARBONATE 800 MILLIGRAM(S): 2400 POWDER, FOR SUSPENSION ORAL at 08:02

## 2019-02-15 RX ADMIN — PIPERACILLIN AND TAZOBACTAM 25 GRAM(S): 4; .5 INJECTION, POWDER, LYOPHILIZED, FOR SOLUTION INTRAVENOUS at 17:24

## 2019-02-15 RX ADMIN — Medication 81 MILLIGRAM(S): at 11:18

## 2019-02-15 RX ADMIN — OXYCODONE AND ACETAMINOPHEN 1 TABLET(S): 5; 325 TABLET ORAL at 21:27

## 2019-02-15 RX ADMIN — DULOXETINE HYDROCHLORIDE 60 MILLIGRAM(S): 30 CAPSULE, DELAYED RELEASE ORAL at 11:19

## 2019-02-15 RX ADMIN — Medication 5 UNIT(S): at 22:17

## 2019-02-15 RX ADMIN — Medication 3 UNIT(S): at 08:02

## 2019-02-15 RX ADMIN — Medication 100 MILLIGRAM(S): at 21:27

## 2019-02-15 RX ADMIN — Medication 50 MILLIGRAM(S): at 06:02

## 2019-02-15 RX ADMIN — OXYCODONE HYDROCHLORIDE 2.5 MILLIGRAM(S): 5 TABLET ORAL at 02:35

## 2019-02-15 RX ADMIN — OXYCODONE HYDROCHLORIDE 2.5 MILLIGRAM(S): 5 TABLET ORAL at 03:35

## 2019-02-15 RX ADMIN — ATORVASTATIN CALCIUM 20 MILLIGRAM(S): 80 TABLET, FILM COATED ORAL at 21:27

## 2019-02-15 RX ADMIN — OXYCODONE AND ACETAMINOPHEN 1 TABLET(S): 5; 325 TABLET ORAL at 22:00

## 2019-02-15 NOTE — PROGRESS NOTE ADULT - SUBJECTIVE AND OBJECTIVE BOX
Cardiovascular Disease Progress Note    Overnight events: No acute events overnight. feels well on abx. no sob/cp. is hopeful can be discharged later today   Otherwise review of systems negative    Objective Findings:  T(C): 36.4 (02-15-19 @ 04:06), Max: 36.8 (19 @ 08:30)  HR: 72 (02-15-19 @ 04:06) (72 - 84)  BP: 146/80 (02-15-19 @ 04:06) (137/67 - 160/79)  RR: 18 (02-15-19 @ 04:06) (18 - 18)  SpO2: 98% (02-15-19 @ 04:06) (97% - 100%)  Wt(kg): --  Daily     Daily Weight in k.8 (2019 12:15)      Physical Exam:  Gen: NAD  HEENT: EOMI  CV: RRR, normal S1 + S2, no m/r/g  Lungs: CTAB  Abd: soft, non-tender  Ext: No edema    Telemetry: nsr    Laboratory Data:                        9.5    4.52  )-----------( 273      ( 2019 10:39 )             31.1     02-14    133<L>  |  91<L>  |  30<H>  ----------------------------<  89  5.3   |  24  |  4.63<H>    Ca    7.1<L>      2019 09:29                Inpatient Medications:  MEDICATIONS  (STANDING):  aspirin enteric coated 81 milliGRAM(s) Oral daily  atorvastatin 20 milliGRAM(s) Oral at bedtime  azithromycin  IVPB 500 milliGRAM(s) IV Intermittent every 24 hours  cinacalcet 60 milliGRAM(s) Oral daily  dextrose 5%. 1000 milliLiter(s) (50 mL/Hr) IV Continuous <Continuous>  dextrose 50% Injectable 12.5 Gram(s) IV Push once  dextrose 50% Injectable 25 Gram(s) IV Push once  dextrose 50% Injectable 25 Gram(s) IV Push once  DULoxetine 60 milliGRAM(s) Oral daily  heparin  Injectable 5000 Unit(s) SubCutaneous every 12 hours  hydrALAZINE 100 milliGRAM(s) Oral every 8 hours  insulin detemir injectable (LEVEMIR) 6 Unit(s) SubCutaneous at bedtime  insulin lispro (HumaLOG) corrective regimen sliding scale   SubCutaneous three times a day before meals  insulin lispro (HumaLOG) corrective regimen sliding scale   SubCutaneous at bedtime  insulin lispro Injectable (HumaLOG) 3 Unit(s) SubCutaneous before breakfast  insulin lispro Injectable (HumaLOG) 3 Unit(s) SubCutaneous before lunch  insulin lispro Injectable (HumaLOG) 3 Unit(s) SubCutaneous before dinner  metoprolol succinate ER 50 milliGRAM(s) Oral daily  piperacillin/tazobactam IVPB. 3.375 Gram(s) IV Intermittent every 12 hours  sevelamer hydrochloride 800 milliGRAM(s) Oral three times a day with meals      Assessment:  - DKA  -lactic acidosis  -elevated cardiac enzymes  -CHF, volume overload. acute on chronic systolic HF  -possible PNA  -ischemic cardiomyopathy, cad s/p multiple stents  -esrd on hd  -PAD s/p prior intervention   -remote TIA      Recs:  -appreciate renal recs. s/p HD. currently appears euvolemic  -DKA resolved. f/u endo recs  -no true ischemic sx. ekg with nonspecific ST changes. elevated hs-trop with negative delta. would defer ischemic work up for now  -hx of prolonged qtc. avoid qtc prolonging meds  -LLE pain --> f/u venous duplex  -CT chest findings concerning for malignancy and possible left lower lobe pna. follows with dr drake and dr michael. scheduled for surgery at [facility] in 2 weeks. remains on abx for pna. s/p speech and swallow eval to r.o aspiration   -c/w asa, plavix, statin for ischemic cardiomyopathy/CAD/PAD if not contraindicated  -c/w toprol and hydralazine for ischemic cardiomyopathy and HTN   -dvt ppx      Over 25 minutes spent on total encounter; more than 50% of the visit was spent counseling and/or coordinating care by the attending physician.      Julián Biswas MD   Cardiovascular Disease  [phone]

## 2019-02-15 NOTE — PROGRESS NOTE ADULT - SUBJECTIVE AND OBJECTIVE BOX
PULMONARY PROGRESS NOTE    NATHAN MEEKS  MRN-[id number]    Patient is a 63y old  Female who presents with a chief complaint of DKA/Hyperkalemia/Pneumonia (12 Feb 2019 12:32)      HPI:  says cough is improving, no sob, tired    ROS:   -no N/V    MEDICATIONS  (STANDING):  aspirin enteric coated 81 milliGRAM(s) Oral daily  atorvastatin 20 milliGRAM(s) Oral at bedtime  azithromycin  IVPB 500 milliGRAM(s) IV Intermittent every 24 hours  cinacalcet 60 milliGRAM(s) Oral daily  dextrose 5%. 1000 milliLiter(s) (50 mL/Hr) IV Continuous <Continuous>  dextrose 50% Injectable 12.5 Gram(s) IV Push once  dextrose 50% Injectable 25 Gram(s) IV Push once  dextrose 50% Injectable 25 Gram(s) IV Push once  DULoxetine 60 milliGRAM(s) Oral daily  heparin  Injectable 5000 Unit(s) SubCutaneous every 12 hours  hydrALAZINE 100 milliGRAM(s) Oral every 8 hours  insulin detemir injectable (LEVEMIR) 6 Unit(s) SubCutaneous at bedtime  insulin lispro (HumaLOG) corrective regimen sliding scale   SubCutaneous three times a day before meals  insulin lispro (HumaLOG) corrective regimen sliding scale   SubCutaneous at bedtime  insulin lispro Injectable (HumaLOG) 3 Unit(s) SubCutaneous before breakfast  insulin lispro Injectable (HumaLOG) 3 Unit(s) SubCutaneous before lunch  insulin lispro Injectable (HumaLOG) 3 Unit(s) SubCutaneous before dinner  metoprolol succinate ER 50 milliGRAM(s) Oral daily  piperacillin/tazobactam IVPB. 3.375 Gram(s) IV Intermittent every 12 hours  sevelamer hydrochloride 800 milliGRAM(s) Oral three times a day with meals    MEDICATIONS  (PRN):  dextrose 40% Gel 15 Gram(s) Oral once PRN Blood Glucose LESS THAN 70 milliGRAM(s)/deciliter  glucagon  Injectable 1 milliGRAM(s) IntraMuscular once PRN Glucose LESS THAN 70 milligrams/decilite      EXAM:  Vital Signs Last 24 Hrs  T(C): 36.4 (15 Feb 2019 04:06), Max: 36.7 (14 Feb 2019 12:15)  T(F): 97.5 (15 Feb 2019 04:06), Max: 98.1 (14 Feb 2019 12:15)  HR: 72 (15 Feb 2019 04:06) (72 - 84)  BP: 146/80 (15 Feb 2019 04:06) (137/76 - 160/79)  BP(mean): --  RR: 18 (15 Feb 2019 04:06) (18 - 18)  SpO2: 98% (15 Feb 2019 04:06) (97% - 100%)    GENERAL: The patient is awake and alert in no apparent distress.   LUNGS: clear, no wheeze    LABS/IMAGING: reviewed                                   9.5    4.52  )-----------( 273      ( 14 Feb 2019 10:39 )             31.1   02-14    133<L>  |  91<L>  |  30<H>  ----------------------------<  89  5.3   |  24  |  4.63<H>    Ca    7.1<L>      14 Feb 2019 09:29          < from: CT Angio Chest w/ IV Cont (02.11.19 @ 20:37) >  IMPRESSION:   No pulmonary embolism.    Debris/secretions within the trachea and left main bronchus. Scattered   patchy/groundglassleft lower lobe opacities may be infectious in   etiology. This constellation of findings can be seen in the setting of   aspiration.    Unilateral interlobular septal thickening on the right as seen on the   prior CT of the chest dated 1/23/2019. Lymphangitic spread of malignancy   cannot be excluded.    Right paratracheal, subcarinal and hilar lymphadenopathy, overall not   significant change from 1/23/2019.    Consider PET/CT for further evaluation.    < end of copied text >      PROBLEM LIST:  63y Female with HEALTH ISSUES - PROBLEM Dx:  Type 1 diabetes mellitus with diabetic peripheral angiopathy without gangrene: Type 1 diabetes mellitus with diabetic peripheral angiopathy without gangrene  ESRD (end stage renal disease): ESRD (end stage renal disease)  Hyperkalemia: Hyperkalemia  Pneumonia of left lower lobe due to infectious organism: Pneumonia of left lower lobe due to infectious organism  Diabetic ketoacidosis without coma associated with type 1 diabetes mellitus: Diabetic ketoacidosis without coma associated with type 1 diabetes mellitus    RECS:  -HCAP coverage, would check urine legionella ag, if neg would dc azithro.  Complete 7 days abx total.  -swallow eval  -for thoracic surgery in 2 weeks for abnormal ct  -hd per renal    Alem Tate MD   [phone]

## 2019-02-15 NOTE — PROGRESS NOTE ADULT - SUBJECTIVE AND OBJECTIVE BOX
Patient is a 63y old  Female who presents with a chief complaint of DKA/Hyperkalemia/Pneumonia (15 Feb 2019 17:58)      SUBJECTIVE / OVERNIGHT EVENTS: No new complaints. Had chst pain, resolved.  Review of Systems  palpitations no  sob no  nausea no  headache no    MEDICATIONS  (STANDING):  aspirin enteric coated 81 milliGRAM(s) Oral daily  atorvastatin 20 milliGRAM(s) Oral at bedtime  azithromycin  IVPB 500 milliGRAM(s) IV Intermittent every 24 hours  cinacalcet 60 milliGRAM(s) Oral daily  dextrose 5%. 1000 milliLiter(s) (50 mL/Hr) IV Continuous <Continuous>  dextrose 50% Injectable 12.5 Gram(s) IV Push once  dextrose 50% Injectable 25 Gram(s) IV Push once  dextrose 50% Injectable 25 Gram(s) IV Push once  DULoxetine 60 milliGRAM(s) Oral daily  heparin  Injectable 5000 Unit(s) SubCutaneous every 12 hours  hydrALAZINE 100 milliGRAM(s) Oral every 8 hours  insulin detemir injectable (LEVEMIR) 5 Unit(s) SubCutaneous at bedtime  insulin lispro (HumaLOG) corrective regimen sliding scale   SubCutaneous three times a day before meals  insulin lispro (HumaLOG) corrective regimen sliding scale   SubCutaneous at bedtime  insulin lispro Injectable (HumaLOG) 3 Unit(s) SubCutaneous before dinner  insulin lispro Injectable (HumaLOG) 2 Unit(s) SubCutaneous before lunch  metoprolol succinate ER 50 milliGRAM(s) Oral daily  oxyCODONE    5 mG/acetaminophen 325 mG 1 Tablet(s) Oral once  piperacillin/tazobactam IVPB. 3.375 Gram(s) IV Intermittent every 12 hours  sevelamer hydrochloride 800 milliGRAM(s) Oral three times a day with meals    MEDICATIONS  (PRN):  dextrose 40% Gel 15 Gram(s) Oral once PRN Blood Glucose LESS THAN 70 milliGRAM(s)/deciliter  glucagon  Injectable 1 milliGRAM(s) IntraMuscular once PRN Glucose LESS THAN 70 milligrams/deciliter      Vital Signs Last 24 Hrs  T(C): 36.8 (15 Feb 2019 20:39), Max: 36.8 (15 Feb 2019 20:39)  T(F): 98.2 (15 Feb 2019 20:39), Max: 98.2 (15 Feb 2019 20:39)  HR: 75 (15 Feb 2019 20:39) (72 - 80)  BP: 131/65 (15 Feb 2019 20:39) (122/75 - 162/78)  BP(mean): --  RR: 18 (15 Feb 2019 20:39) (18 - 18)  SpO2: 98% (15 Feb 2019 20:39) (96% - 98%)    PHYSICAL EXAM:  GENERAL: NAD  HEAD:  Atraumatic, Normocephalic  EYES: EOMI, PERRLA, conjunctiva and sclera clear  NECK: Supple, No JVD  CHEST/LUNG: Clear to auscultation bilaterally; No wheeze  HEART: Regular rate and rhythm; No murmurs, rubs, or gallops  ABDOMEN: Soft, Nontender, Nondistended; Bowel sounds present  EXTREMITIES:  2+ Peripheral Pulses, No clubbing, cyanosis, or edema  PSYCH: AAOx3  NEUROLOGY: non-focal  SKIN: No rashes or lesions    LABS:                        9.6    4.14  )-----------( 293      ( 15 Feb 2019 15:10 )             31.7     02-15    134<L>  |  92<L>  |  17  ----------------------------<  102<H>  4.6   |  24  |  3.94<H>    Ca    7.3<L>      15 Feb 2019 16:10                  RADIOLOGY & ADDITIONAL TESTS:    Imaging Personally Reviewed:    Consultant(s) Notes Reviewed:      Care Discussed with Consultants/Other Providers:

## 2019-02-15 NOTE — PROGRESS NOTE ADULT - SUBJECTIVE AND OBJECTIVE BOX
Patient is a 63y old  Female who presents with a chief complaint of DKA/Hyperkalemia/Pneumonia, on IV abx.      SUBJECTIVE: Pt seen and examined for c/o substernal CP rated 8/10, stabbing, radiating to left jaw.     MEDICATIONS  (STANDING):  aspirin enteric coated 81 milliGRAM(s) Oral daily  atorvastatin 20 milliGRAM(s) Oral at bedtime  azithromycin  IVPB 500 milliGRAM(s) IV Intermittent every 24 hours  cinacalcet 60 milliGRAM(s) Oral daily  dextrose 5%. 1000 milliLiter(s) (50 mL/Hr) IV Continuous <Continuous>  dextrose 50% Injectable 12.5 Gram(s) IV Push once  dextrose 50% Injectable 25 Gram(s) IV Push once  dextrose 50% Injectable 25 Gram(s) IV Push once  DULoxetine 60 milliGRAM(s) Oral daily  heparin  Injectable 5000 Unit(s) SubCutaneous every 12 hours  hydrALAZINE 100 milliGRAM(s) Oral every 8 hours  insulin detemir injectable (LEVEMIR) 5 Unit(s) SubCutaneous at bedtime  insulin lispro (HumaLOG) corrective regimen sliding scale   SubCutaneous three times a day before meals  insulin lispro (HumaLOG) corrective regimen sliding scale   SubCutaneous at bedtime  insulin lispro Injectable (HumaLOG) 3 Unit(s) SubCutaneous before dinner  insulin lispro Injectable (HumaLOG) 2 Unit(s) SubCutaneous before lunch  metoprolol succinate ER 50 milliGRAM(s) Oral daily  piperacillin/tazobactam IVPB. 3.375 Gram(s) IV Intermittent every 12 hours  sevelamer hydrochloride 800 milliGRAM(s) Oral three times a day with meals    MEDICATIONS  (PRN):  dextrose 40% Gel 15 Gram(s) Oral once PRN Blood Glucose LESS THAN 70 milliGRAM(s)/deciliter  glucagon  Injectable 1 milliGRAM(s) IntraMuscular once PRN Glucose LESS THAN 70 milligrams/deciliter        CAPILLARY BLOOD GLUCOSE      POCT Blood Glucose.: 108 mg/dL (15 Feb 2019 16:40)  POCT Blood Glucose.: 160 mg/dL (15 Feb 2019 12:03)  POCT Blood Glucose.: 32 mg/dL (15 Feb 2019 11:41)  POCT Blood Glucose.: 34 mg/dL (15 Feb 2019 11:40)  POCT Blood Glucose.: 99 mg/dL (15 Feb 2019 07:25)  POCT Blood Glucose.: 183 mg/dL (14 Feb 2019 22:53)    I&O's Summary    14 Feb 2019 07:01  -  15 Feb 2019 07:00  --------------------------------------------------------  IN: 2260 mL / OUT: 2200 mL / NET: 60 mL    15 Feb 2019 07:01  -  15 Feb 2019 17:58  --------------------------------------------------------  IN: 820 mL / OUT: 0 mL / NET: 820 mL        PHYSICAL EXAM:  VS: /75  HR 80  RR 18 T 97.8 SpO2 98% on 2L NC  GENERAL: NAD, well-developed  HEAD:  Atraumatic, Normocephalic  EYES: EOMI, PERRLA, conjunctiva and sclera clear  NECK: Supple, No JVD  CHEST/LUNG: Clear to auscultation bilaterally; No wheeze  HEART: Regular rate and rhythm; No murmurs, rubs, or gallops  ABDOMEN: Soft, Nontender, Nondistended; Bowel sounds present  EXTREMITIES:  2+ Peripheral Pulses, No clubbing, cyanosis, or edema  PSYCH: AAOx3  NEUROLOGY: non-focal  SKIN: No rashes or lesions    LABS:                        9.6    4.14  )-----------( 293      ( 15 Feb 2019 15:10 )             31.7     02-15    134<L>  |  92<L>  |  17  ----------------------------<  102<H>  4.6   |  24  |  3.94<H>    Ca    7.3<L>      15 Feb 2019 16:10      Consultant(s) Notes Reviewed:      Care Discussed with Consultants/Other Providers: Patient is a 63y old  Female who presents with a chief complaint of DKA/Hyperkalemia/Pneumonia, on IV abx.      SUBJECTIVE: Pt seen and examined for c/o substernal CP rated 8/10, stabbing, radiating to left jaw.     MEDICATIONS  (STANDING):  aspirin enteric coated 81 milliGRAM(s) Oral daily  atorvastatin 20 milliGRAM(s) Oral at bedtime  azithromycin  IVPB 500 milliGRAM(s) IV Intermittent every 24 hours  cinacalcet 60 milliGRAM(s) Oral daily  dextrose 5%. 1000 milliLiter(s) (50 mL/Hr) IV Continuous <Continuous>  dextrose 50% Injectable 12.5 Gram(s) IV Push once  dextrose 50% Injectable 25 Gram(s) IV Push once  dextrose 50% Injectable 25 Gram(s) IV Push once  DULoxetine 60 milliGRAM(s) Oral daily  heparin  Injectable 5000 Unit(s) SubCutaneous every 12 hours  hydrALAZINE 100 milliGRAM(s) Oral every 8 hours  insulin detemir injectable (LEVEMIR) 5 Unit(s) SubCutaneous at bedtime  insulin lispro (HumaLOG) corrective regimen sliding scale   SubCutaneous three times a day before meals  insulin lispro (HumaLOG) corrective regimen sliding scale   SubCutaneous at bedtime  insulin lispro Injectable (HumaLOG) 3 Unit(s) SubCutaneous before dinner  insulin lispro Injectable (HumaLOG) 2 Unit(s) SubCutaneous before lunch  metoprolol succinate ER 50 milliGRAM(s) Oral daily  piperacillin/tazobactam IVPB. 3.375 Gram(s) IV Intermittent every 12 hours  sevelamer hydrochloride 800 milliGRAM(s) Oral three times a day with meals    MEDICATIONS  (PRN):  dextrose 40% Gel 15 Gram(s) Oral once PRN Blood Glucose LESS THAN 70 milliGRAM(s)/deciliter  glucagon  Injectable 1 milliGRAM(s) IntraMuscular once PRN Glucose LESS THAN 70 milligrams/deciliter        CAPILLARY BLOOD GLUCOSE      POCT Blood Glucose.: 108 mg/dL (15 Feb 2019 16:40)  POCT Blood Glucose.: 160 mg/dL (15 Feb 2019 12:03)  POCT Blood Glucose.: 32 mg/dL (15 Feb 2019 11:41)  POCT Blood Glucose.: 34 mg/dL (15 Feb 2019 11:40)  POCT Blood Glucose.: 99 mg/dL (15 Feb 2019 07:25)  POCT Blood Glucose.: 183 mg/dL (14 Feb 2019 22:53)    I&O's Summary    14 Feb 2019 07:01  -  15 Feb 2019 07:00  --------------------------------------------------------  IN: 2260 mL / OUT: 2200 mL / NET: 60 mL    15 Feb 2019 07:01  -  15 Feb 2019 17:58  --------------------------------------------------------  IN: 820 mL / OUT: 0 mL / NET: 820 mL        PHYSICAL EXAM:  VS: /75  HR 80  RR 18 T 97.8 SpO2 98% on 2L NC  GENERAL: NAD, c/o CP as above  HEAD:  Atraumatic, Normocephalic  EYES: EOMI, PERRLA, conjunctiva and sclera clear  NECK: Supple, No JVD  CHEST/LUNG: Clear to auscultation bilaterally; No wheeze  HEART: Regular rate and rhythm; No murmurs, rubs, or gallops  ABDOMEN: Soft, Nontender, Nondistended; Bowel sounds present  EXTREMITIES:  2+ Peripheral Pulses, No clubbing, cyanosis, or edema  PSYCH: AAOx3  NEUROLOGY: non-focal  SKIN: No rashes or lesions    LABS:                        9.6    4.14  )-----------( 293      ( 15 Feb 2019 15:10 )             31.7     02-15    134<L>  |  92<L>  |  17  ----------------------------<  102<H>  4.6   |  24  |  3.94<H>    Ca    7.3<L>      15 Feb 2019 16:10      Consultant(s) Notes Reviewed:      Care Discussed with Consultants/Other Providers: Dr. Viera

## 2019-02-15 NOTE — PROGRESS NOTE ADULT - SUBJECTIVE AND OBJECTIVE BOX
Diabetes Follow up note:  Interval Hx:  62 yo woman with uncontrolled type 1 diabetes (well known from prior admissions) w/ESRD on HD, neuropathy, CAD, CVA, CHF w/ very labile blood sugars, non-adherent to diet at home with micro and macrovascular complications admitted for shortness of breath and pneumonia on IV abx. Pt seen at bedside after episode of severe hypoglycemia (BS in 30s). Given 1 amp of D50. Pt reports "I didn't feel a thing". Endorses eating cream of wheat and pineapples this morning at breakfast time. Eating cookies now and waiting for repeat FS. Per pt,  is supposed to bring her "a big sandwich" this afternoon.     Review of Systems:  General: Denies pain  GI: Tolerating POs without any N/V/D/ABD PAIN.  CV: No CP/SOB  ENDO: No S&Sx of hypoglycemia + hypoglycemia unawareness.   MEDS:  atorvastatin 20 milliGRAM(s) Oral at bedtime  cinacalcet 60 milliGRAM(s) Oral daily    insulin detemir injectable (LEVEMIR) 6 Unit(s) SubCutaneous at bedtime  insulin lispro (HumaLOG) corrective regimen sliding scale   SubCutaneous three times a day before meals  insulin lispro (HumaLOG) corrective regimen sliding scale   SubCutaneous at bedtime  insulin lispro Injectable (HumaLOG) 3 Unit(s) SubCutaneous before dinner  insulin lispro Injectable (HumaLOG) 2 Unit(s) SubCutaneous before lunch    azithromycin  IVPB 500 milliGRAM(s) IV Intermittent every 24 hours  piperacillin/tazobactam IVPB. 3.375 Gram(s) IV Intermittent every 12 hours    Allergies    No Known Allergies        PE:  General: Female sitting in bed. NAD.   Vital Signs Last 24 Hrs  T(C): 36.5 (15 Feb 2019 11:56), Max: 36.7 (14 Feb 2019 12:15)  T(F): 97.7 (15 Feb 2019 11:56), Max: 98.1 (14 Feb 2019 12:15)  HR: 78 (15 Feb 2019 11:56) (72 - 84)  BP: 162/78 (15 Feb 2019 11:56) (137/76 - 162/78)  BP(mean): --  RR: 18 (15 Feb 2019 11:56) (18 - 18)  SpO2: 97% (15 Feb 2019 11:56) (97% - 100%)  Abd: Soft, NT,ND,   Extremities: Warm. no edema. L AV fistula in place.   Neuro: A&O X3    LABS:    POCT Blood Glucose.: 32 mg/dL (02-15-19 @ 11:41)  POCT Blood Glucose.: 34 mg/dL (02-15-19 @ 11:40)  POCT Blood Glucose.: 99 mg/dL (02-15-19 @ 07:25)  POCT Blood Glucose.: 183 mg/dL (02-14-19 @ 22:53)  POCT Blood Glucose.: 149 mg/dL (02-14-19 @ 16:36)  POCT Blood Glucose.: 88 mg/dL (02-14-19 @ 12:47)  POCT Blood Glucose.: 89 mg/dL (02-14-19 @ 07:28)  POCT Blood Glucose.: 269 mg/dL (02-13-19 @ 21:09)  POCT Blood Glucose.: 77 mg/dL (02-13-19 @ 17:02)  POCT Blood Glucose.: 139 mg/dL (02-13-19 @ 12:02)  POCT Blood Glucose.: 118 mg/dL (02-13-19 @ 07:29)  POCT Blood Glucose.: 311 mg/dL (02-12-19 @ 23:38)  POCT Blood Glucose.: 363 mg/dL (02-12-19 @ 21:01)  POCT Blood Glucose.: 143 mg/dL (02-12-19 @ 16:40)  POCT Blood Glucose.: 123 mg/dL (02-12-19 @ 14:18)                            9.5    4.52  )-----------( 273      ( 14 Feb 2019 10:39 )             31.1       02-15    133<L>  |  92<L>  |  17  ----------------------------<  41<LL>  4.1   |  27  |  3.74<H>    Ca    7.5<L>      15 Feb 2019 11:04        Hemoglobin A1C, Whole Blood: 7.6 % <H> [4.0 - 5.6] (02-13-19 @ 08:42)            Contact number: isabel [phone] or [phone]

## 2019-02-15 NOTE — PROGRESS NOTE ADULT - SUBJECTIVE AND OBJECTIVE BOX
[facility]   [phone]  RENAL FOLLOW UP NOTE  --------------------------------------------------------------------------------  Chief Complaint:    24 hour events/subjective:    seen earlier   states overall not feeling well today but unable to describe further       PAST HISTORY  --------------------------------------------------------------------------------  No significant changes to PMH, PSH, FHx, SHx, unless otherwise noted    ALLERGIES & MEDICATIONS  --------------------------------------------------------------------------------  Allergies    No Known Allergies    Intolerances      Standing Inpatient Medications  aspirin enteric coated 81 milliGRAM(s) Oral daily  atorvastatin 20 milliGRAM(s) Oral at bedtime  azithromycin  IVPB 500 milliGRAM(s) IV Intermittent every 24 hours  cinacalcet 60 milliGRAM(s) Oral daily  dextrose 5%. 1000 milliLiter(s) IV Continuous <Continuous>  dextrose 50% Injectable 12.5 Gram(s) IV Push once  dextrose 50% Injectable 25 Gram(s) IV Push once  dextrose 50% Injectable 25 Gram(s) IV Push once  DULoxetine 60 milliGRAM(s) Oral daily  heparin  Injectable 5000 Unit(s) SubCutaneous every 12 hours  hydrALAZINE 100 milliGRAM(s) Oral every 8 hours  insulin detemir injectable (LEVEMIR) 5 Unit(s) SubCutaneous at bedtime  insulin lispro (HumaLOG) corrective regimen sliding scale   SubCutaneous three times a day before meals  insulin lispro (HumaLOG) corrective regimen sliding scale   SubCutaneous at bedtime  insulin lispro Injectable (HumaLOG) 3 Unit(s) SubCutaneous before dinner  insulin lispro Injectable (HumaLOG) 2 Unit(s) SubCutaneous before lunch  metoprolol succinate ER 50 milliGRAM(s) Oral daily  oxyCODONE    5 mG/acetaminophen 325 mG 1 Tablet(s) Oral once  piperacillin/tazobactam IVPB. 3.375 Gram(s) IV Intermittent every 12 hours  sevelamer hydrochloride 800 milliGRAM(s) Oral three times a day with meals    PRN Inpatient Medications  dextrose 40% Gel 15 Gram(s) Oral once PRN  glucagon  Injectable 1 milliGRAM(s) IntraMuscular once PRN      REVIEW OF SYSTEMS  --------------------------------------------------------------------------------    Gen: denies fevers/chills,  CVS:  +  chest pain/palpitations -   Resp: + SOB/Cough +   GI: Denies N/V/Abd pain  : Denies dysuria    All other systems were reviewed and are negative, except as noted.    VITALS/PHYSICAL EXAM  --------------------------------------------------------------------------------  T(C): 36.8 (02-15-19 @ 20:39), Max: 36.8 (02-15-19 @ 20:39)  HR: 75 (02-15-19 @ 20:39) (72 - 80)  BP: 131/65 (02-15-19 @ 20:39) (122/75 - 162/78)  RR: 18 (02-15-19 @ 20:39) (18 - 18)  SpO2: 98% (02-15-19 @ 20:39) (96% - 98%)  Wt(kg): --        02-14-19 @ 07:01  -  02-15-19 @ 07:00  --------------------------------------------------------  IN: 2260 mL / OUT: 2200 mL / NET: 60 mL    02-15-19 @ 07:01  -  02-15-19 @ 21:25  --------------------------------------------------------  IN: 820 mL / OUT: 0 mL / NET: 820 mL      Physical Exam:  	  Gen: Non toxic ill appearing muscle wasting   	no jvd , supple neck,   	Pulm: decrease bs  no rales or ronchi or wheezing  	CV: RRR, S1S2; no rub  	Abd: +BS, soft, nontender/nondistended  	: No suprapubic tenderness  	UE: Warm, no cyanosis  no clubbing,  no edema; no asterixis  	LE: Warm, no cyanosis  no clubbing, 1-2 + pitting zo  	  LABS/STUDIES  --------------------------------------------------------------------------------              9.6    4.14  >-----------<  293      [02-15-19 @ 15:10]              31.7     134  |  92  |  17  ----------------------------<  102      [02-15-19 @ 16:10]  4.6   |  24  |  3.94        Ca     7.3     [02-15-19 @ 16:10]            Creatinine Trend:  SCr 3.94 [02-15 @ 16:10]  SCr 3.74 [02-15 @ 11:04]  SCr 4.63 [02-14 @ 09:29]  SCr 3.35 [02-13 @ 07:10]  SCr 5.09 [02-12 @ 11:01]                  PTH -- (Ca 8.3)      [03-03-18 @ 08:53]   134  HbA1c 7.6      [02-13-19 @ 08:42]  TSH 0.71      [11-17-18 @ 08:25]  Lipid: chol 113, TG 86, HDL 59, LDL 37      [04-30-18 @ 06:44]

## 2019-02-16 LAB
ANION GAP SERPL CALC-SCNC: 18 MMOL/L — HIGH (ref 5–17)
BUN SERPL-MCNC: 23 MG/DL — SIGNIFICANT CHANGE UP (ref 7–23)
CALCIUM SERPL-MCNC: 6.9 MG/DL — LOW (ref 8.4–10.5)
CHLORIDE SERPL-SCNC: 90 MMOL/L — LOW (ref 96–108)
CK MB BLD-MCNC: 2.2 % — SIGNIFICANT CHANGE UP (ref 0–3.5)
CK MB CFR SERPL CALC: 2.6 NG/ML — SIGNIFICANT CHANGE UP (ref 0–3.8)
CK SERPL-CCNC: 119 U/L — SIGNIFICANT CHANGE UP (ref 25–170)
CO2 SERPL-SCNC: 24 MMOL/L — SIGNIFICANT CHANGE UP (ref 22–31)
CREAT SERPL-MCNC: 4.74 MG/DL — HIGH (ref 0.5–1.3)
GLUCOSE BLDC GLUCOMTR-MCNC: 136 MG/DL — HIGH (ref 70–99)
GLUCOSE BLDC GLUCOMTR-MCNC: 171 MG/DL — HIGH (ref 70–99)
GLUCOSE BLDC GLUCOMTR-MCNC: 197 MG/DL — HIGH (ref 70–99)
GLUCOSE BLDC GLUCOMTR-MCNC: 64 MG/DL — LOW (ref 70–99)
GLUCOSE BLDC GLUCOMTR-MCNC: 68 MG/DL — LOW (ref 70–99)
GLUCOSE SERPL-MCNC: 163 MG/DL — HIGH (ref 70–99)
HCT VFR BLD CALC: 31.2 % — LOW (ref 34.5–45)
HGB BLD-MCNC: 9.9 G/DL — LOW (ref 11.5–15.5)
MCHC RBC-ENTMCNC: 31.7 GM/DL — LOW (ref 32–36)
MCHC RBC-ENTMCNC: 33.3 PG — SIGNIFICANT CHANGE UP (ref 27–34)
MCV RBC AUTO: 105.1 FL — HIGH (ref 80–100)
PLATELET # BLD AUTO: 263 K/UL — SIGNIFICANT CHANGE UP (ref 150–400)
POTASSIUM SERPL-MCNC: 4.8 MMOL/L — SIGNIFICANT CHANGE UP (ref 3.5–5.3)
POTASSIUM SERPL-SCNC: 4.8 MMOL/L — SIGNIFICANT CHANGE UP (ref 3.5–5.3)
RBC # BLD: 2.97 M/UL — LOW (ref 3.8–5.2)
RBC # FLD: 15.1 % — HIGH (ref 10.3–14.5)
SODIUM SERPL-SCNC: 132 MMOL/L — LOW (ref 135–145)
TROPONIN T, HIGH SENSITIVITY RESULT: 258 NG/L — HIGH (ref 0–51)
WBC # BLD: 4.63 K/UL — SIGNIFICANT CHANGE UP (ref 3.8–10.5)
WBC # FLD AUTO: 4.63 K/UL — SIGNIFICANT CHANGE UP (ref 3.8–10.5)

## 2019-02-16 PROCEDURE — 93010 ELECTROCARDIOGRAM REPORT: CPT

## 2019-02-16 PROCEDURE — 99233 SBSQ HOSP IP/OBS HIGH 50: CPT

## 2019-02-16 RX ORDER — NITROGLYCERIN 6.5 MG
0.4 CAPSULE, EXTENDED RELEASE ORAL
Qty: 0 | Refills: 0 | Status: DISCONTINUED | OUTPATIENT
Start: 2019-02-16 | End: 2019-02-21

## 2019-02-16 RX ORDER — HYDROMORPHONE HYDROCHLORIDE 2 MG/ML
0.5 INJECTION INTRAMUSCULAR; INTRAVENOUS; SUBCUTANEOUS EVERY 8 HOURS
Qty: 0 | Refills: 0 | Status: DISCONTINUED | OUTPATIENT
Start: 2019-02-16 | End: 2019-02-20

## 2019-02-16 RX ORDER — DEXTROSE 50 % IN WATER 50 %
12.5 SYRINGE (ML) INTRAVENOUS ONCE
Qty: 0 | Refills: 0 | Status: COMPLETED | OUTPATIENT
Start: 2019-02-16 | End: 2019-02-16

## 2019-02-16 RX ADMIN — Medication 2 UNIT(S): at 08:03

## 2019-02-16 RX ADMIN — DULOXETINE HYDROCHLORIDE 60 MILLIGRAM(S): 30 CAPSULE, DELAYED RELEASE ORAL at 11:17

## 2019-02-16 RX ADMIN — SEVELAMER CARBONATE 800 MILLIGRAM(S): 2400 POWDER, FOR SUSPENSION ORAL at 11:19

## 2019-02-16 RX ADMIN — AZITHROMYCIN 250 MILLIGRAM(S): 500 TABLET, FILM COATED ORAL at 23:56

## 2019-02-16 RX ADMIN — PIPERACILLIN AND TAZOBACTAM 25 GRAM(S): 4; .5 INJECTION, POWDER, LYOPHILIZED, FOR SOLUTION INTRAVENOUS at 20:16

## 2019-02-16 RX ADMIN — SEVELAMER CARBONATE 800 MILLIGRAM(S): 2400 POWDER, FOR SUSPENSION ORAL at 08:01

## 2019-02-16 RX ADMIN — Medication 12.5 GRAM(S): at 11:46

## 2019-02-16 RX ADMIN — PIPERACILLIN AND TAZOBACTAM 25 GRAM(S): 4; .5 INJECTION, POWDER, LYOPHILIZED, FOR SOLUTION INTRAVENOUS at 05:14

## 2019-02-16 RX ADMIN — Medication 81 MILLIGRAM(S): at 11:17

## 2019-02-16 RX ADMIN — Medication 100 MILLIGRAM(S): at 05:14

## 2019-02-16 RX ADMIN — Medication 0.4 MILLIGRAM(S): at 06:51

## 2019-02-16 RX ADMIN — Medication 1: at 08:03

## 2019-02-16 RX ADMIN — Medication 50 MILLIGRAM(S): at 05:14

## 2019-02-16 RX ADMIN — HEPARIN SODIUM 5000 UNIT(S): 5000 INJECTION INTRAVENOUS; SUBCUTANEOUS at 05:14

## 2019-02-16 RX ADMIN — HYDROMORPHONE HYDROCHLORIDE 0.5 MILLIGRAM(S): 2 INJECTION INTRAMUSCULAR; INTRAVENOUS; SUBCUTANEOUS at 14:39

## 2019-02-16 RX ADMIN — CINACALCET 60 MILLIGRAM(S): 30 TABLET, FILM COATED ORAL at 11:17

## 2019-02-16 RX ADMIN — HYDROMORPHONE HYDROCHLORIDE 0.5 MILLIGRAM(S): 2 INJECTION INTRAMUSCULAR; INTRAVENOUS; SUBCUTANEOUS at 15:30

## 2019-02-16 RX ADMIN — ATORVASTATIN CALCIUM 20 MILLIGRAM(S): 80 TABLET, FILM COATED ORAL at 22:00

## 2019-02-16 RX ADMIN — Medication 5 UNIT(S): at 22:00

## 2019-02-16 RX ADMIN — HYDROMORPHONE HYDROCHLORIDE 0.5 MILLIGRAM(S): 2 INJECTION INTRAMUSCULAR; INTRAVENOUS; SUBCUTANEOUS at 23:56

## 2019-02-16 NOTE — PROVIDER CONTACT NOTE (OTHER) - REASON
Hypoglycemic event
Pt is c/o left lower extremity pain
hyperglycemia
hypoglycemia
patient c/o chest pains 10/10
Chest pain

## 2019-02-16 NOTE — PROGRESS NOTE ADULT - SUBJECTIVE AND OBJECTIVE BOX
Patient is a 63y old  Female who presents with a chief complaint of DKA/Hyperkalemia/Pneumonia (16 Feb 2019 14:41)      SUBJECTIVE / OVERNIGHT EVENTS: No new complaints.   Review of Systems  chest pain resolved  palpitations no  sob no  nausea no  headache no    MEDICATIONS  (STANDING):  aspirin enteric coated 81 milliGRAM(s) Oral daily  atorvastatin 20 milliGRAM(s) Oral at bedtime  azithromycin  IVPB 500 milliGRAM(s) IV Intermittent every 24 hours  cinacalcet 60 milliGRAM(s) Oral daily  dextrose 5%. 1000 milliLiter(s) (50 mL/Hr) IV Continuous <Continuous>  dextrose 50% Injectable 12.5 Gram(s) IV Push once  dextrose 50% Injectable 25 Gram(s) IV Push once  dextrose 50% Injectable 25 Gram(s) IV Push once  DULoxetine 60 milliGRAM(s) Oral daily  heparin  Injectable 5000 Unit(s) SubCutaneous every 12 hours  hydrALAZINE 100 milliGRAM(s) Oral every 8 hours  insulin detemir injectable (LEVEMIR) 5 Unit(s) SubCutaneous at bedtime  insulin lispro (HumaLOG) corrective regimen sliding scale   SubCutaneous three times a day before meals  insulin lispro (HumaLOG) corrective regimen sliding scale   SubCutaneous at bedtime  insulin lispro Injectable (HumaLOG) 3 Unit(s) SubCutaneous before dinner  insulin lispro Injectable (HumaLOG) 2 Unit(s) SubCutaneous before breakfast  insulin lispro Injectable (HumaLOG) 2 Unit(s) SubCutaneous before lunch  metoprolol succinate ER 50 milliGRAM(s) Oral daily  piperacillin/tazobactam IVPB. 3.375 Gram(s) IV Intermittent every 12 hours  sevelamer hydrochloride 800 milliGRAM(s) Oral three times a day with meals    MEDICATIONS  (PRN):  dextrose 40% Gel 15 Gram(s) Oral once PRN Blood Glucose LESS THAN 70 milliGRAM(s)/deciliter  glucagon  Injectable 1 milliGRAM(s) IntraMuscular once PRN Glucose LESS THAN 70 milligrams/deciliter  HYDROmorphone  Injectable 0.5 milliGRAM(s) IV Push every 8 hours PRN Moderate Pain (4 - 6)  nitroglycerin     SubLingual 0.4 milliGRAM(s) SubLingual every 5 minutes PRN Chest Pain      Vital Signs Last 24 Hrs  T(C): 36.4 (16 Feb 2019 13:08), Max: 36.8 (15 Feb 2019 20:39)  T(F): 97.5 (16 Feb 2019 13:08), Max: 98.3 (16 Feb 2019 11:28)  HR: 74 (16 Feb 2019 13:08) (74 - 80)  BP: 141/83 (16 Feb 2019 13:08) (123/69 - 157/77)  BP(mean): --  RR: 18 (16 Feb 2019 13:08) (18 - 18)  SpO2: 95% (16 Feb 2019 13:08) (95% - 99%)    PHYSICAL EXAM:  GENERAL: NAD  HEAD:  Atraumatic, Normocephalic  EYES: EOMI, PERRLA, conjunctiva and sclera clear  NECK: Supple, No JVD  CHEST/LUNG: Clear to auscultation bilaterally; No wheeze  HEART: Regular rate and rhythm; No murmurs, rubs, or gallops  ABDOMEN: Soft, Nontender, Nondistended; Bowel sounds present  EXTREMITIES:  2+ Peripheral Pulses, No clubbing, cyanosis, or edema  PSYCH: AAOx3  NEUROLOGY: non-focal  SKIN: No rashes or lesions    LABS:                        9.9    4.63  )-----------( 263      ( 16 Feb 2019 09:18 )             31.2     02-16    132<L>  |  90<L>  |  23  ----------------------------<  163<H>  4.8   |  24  |  4.74<H>    Ca    6.9<L>      16 Feb 2019 07:35        CARDIAC MARKERS ( 16 Feb 2019 07:35 )  x     / x     / 119 U/L / x     / 2.6 ng/mL  CARDIAC MARKERS ( 15 Feb 2019 21:08 )  x     / x     / 113 U/L / x     / 2.4 ng/mL            RADIOLOGY & ADDITIONAL TESTS:    Imaging Personally Reviewed:    Consultant(s) Notes Reviewed:      Care Discussed with Consultants/Other Providers:

## 2019-02-16 NOTE — PROGRESS NOTE ADULT - SUBJECTIVE AND OBJECTIVE BOX
PULMONARY PROGRESS NOTE    NATHAN MEEKS  MRN-[id number]    Patient is a 63y old  Female who presents with a chief complaint of DKA/Hyperkalemia/Pneumonia (15 Feb 2019 21:25)      HPI:  -Appears comfortable. NAD  -    ROS:   -    ACTIVE MEDICATION LIST:  MEDICATIONS  (STANDING):  aspirin enteric coated 81 milliGRAM(s) Oral daily  atorvastatin 20 milliGRAM(s) Oral at bedtime  azithromycin  IVPB 500 milliGRAM(s) IV Intermittent every 24 hours  cinacalcet 60 milliGRAM(s) Oral daily  dextrose 5%. 1000 milliLiter(s) (50 mL/Hr) IV Continuous <Continuous>  dextrose 50% Injectable 12.5 Gram(s) IV Push once  dextrose 50% Injectable 25 Gram(s) IV Push once  dextrose 50% Injectable 25 Gram(s) IV Push once  DULoxetine 60 milliGRAM(s) Oral daily  heparin  Injectable 5000 Unit(s) SubCutaneous every 12 hours  hydrALAZINE 100 milliGRAM(s) Oral every 8 hours  insulin detemir injectable (LEVEMIR) 5 Unit(s) SubCutaneous at bedtime  insulin lispro (HumaLOG) corrective regimen sliding scale   SubCutaneous three times a day before meals  insulin lispro (HumaLOG) corrective regimen sliding scale   SubCutaneous at bedtime  insulin lispro Injectable (HumaLOG) 3 Unit(s) SubCutaneous before dinner  insulin lispro Injectable (HumaLOG) 2 Unit(s) SubCutaneous before breakfast  insulin lispro Injectable (HumaLOG) 2 Unit(s) SubCutaneous before lunch  metoprolol succinate ER 50 milliGRAM(s) Oral daily  piperacillin/tazobactam IVPB. 3.375 Gram(s) IV Intermittent every 12 hours  sevelamer hydrochloride 800 milliGRAM(s) Oral three times a day with meals    MEDICATIONS  (PRN):  dextrose 40% Gel 15 Gram(s) Oral once PRN Blood Glucose LESS THAN 70 milliGRAM(s)/deciliter  glucagon  Injectable 1 milliGRAM(s) IntraMuscular once PRN Glucose LESS THAN 70 milligrams/deciliter  nitroglycerin     SubLingual 0.4 milliGRAM(s) SubLingual every 5 minutes PRN Chest Pain      EXAM:  Vital Signs Last 24 Hrs  T(C): 36.8 (15 Feb 2019 20:39), Max: 36.8 (15 Feb 2019 20:39)  T(F): 98.2 (15 Feb 2019 20:39), Max: 98.2 (15 Feb 2019 20:39)  HR: 76 (16 Feb 2019 06:38) (74 - 80)  BP: 130/72 (16 Feb 2019 06:38) (122/75 - 162/78)  BP(mean): --  RR: 18 (16 Feb 2019 06:38) (18 - 18)  SpO2: 98% (16 Feb 2019 06:38) (96% - 98%)    GENERAL: The patient is awake and alert in no apparent distress.     LUNGS: Clear to auscultation without wheezing, rales or rhonchi; respirations unlabored    HEART: Regular rate and rhythm without murmur.    Labs:                        9.6    4.14  )-----------( 293      ( 15 Feb 2019 15:10 )             31.7   02-16    132<L>  |  90<L>  |  23  ----------------------------<  163<H>  4.8   |  24  |  4.74<H>    Ca    6.9<L>      16 Feb 2019 07:35      PROBLEM LIST:  63y Female with HEALTH ISSUES - PROBLEM Dx:  Type 1 diabetes mellitus with hypoglycemia and without coma: Type 1 diabetes mellitus with hypoglycemia and without coma  Type 1 diabetes mellitus with diabetic peripheral angiopathy without gangrene: Type 1 diabetes mellitus with diabetic peripheral angiopathy without gangrene  ESRD (end stage renal disease): ESRD (end stage renal disease)  Hyperkalemia: Hyperkalemia  Pneumonia of left lower lobe due to infectious organism: Pneumonia of left lower lobe due to infectious organism  Diabetic ketoacidosis without coma associated with type 1 diabetes mellitus: Diabetic ketoacidosis without coma associated with type 1 diabetes mellitus            RECS:  O2  IV Abx  f/u labs    Roseann Romo DO  [phone]

## 2019-02-16 NOTE — PROVIDER CONTACT NOTE (OTHER) - SITUATION
Patient hypoglycemic Blood glucose 64- patient asymptomatic
Pt is c/o left lower extremity pain
patient c/o chest pain 10/10
pt  and repeat 535
pt am FS 60, repeat 60.
Patient has chest and jaw pain 8/10. RSR on telemetry, VSS on 2 liters oxygen.

## 2019-02-16 NOTE — PROGRESS NOTE ADULT - SUBJECTIVE AND OBJECTIVE BOX
Cardiovascular Disease Progress Note    Overnight events: had cp yesterday radiating to neck. ekg with nonspecific st changes unchanged, hs trop with neg delta  Otherwise review of systems negative    Objective Findings:  T(C): 36.8 (02-15-19 @ 20:39), Max: 36.8 (02-15-19 @ 20:39)  HR: 76 (02-16-19 @ 06:38) (74 - 80)  BP: 130/72 (02-16-19 @ 06:38) (122/75 - 162/78)  RR: 18 (02-16-19 @ 06:38) (18 - 18)  SpO2: 98% (02-16-19 @ 06:38) (96% - 98%)  Wt(kg): --  Daily     Daily       Physical Exam:  Gen: NAD  HEENT: EOMI  CV: RRR, normal S1 + S2, no m/r/g  Lungs: CTAB  Abd: soft, non-tender  Ext: No edema        Laboratory Data:                        9.9    4.63  )-----------( 263      ( 16 Feb 2019 09:18 )             31.2     02-16    132<L>  |  90<L>  |  23  ----------------------------<  163<H>  4.8   |  24  |  4.74<H>    Ca    6.9<L>      16 Feb 2019 07:35        CARDIAC MARKERS ( 16 Feb 2019 07:35 )  x     / x     / 119 U/L / x     / 2.6 ng/mL  CARDIAC MARKERS ( 15 Feb 2019 21:08 )  x     / x     / 113 U/L / x     / 2.4 ng/mL          Inpatient Medications:  MEDICATIONS  (STANDING):  aspirin enteric coated 81 milliGRAM(s) Oral daily  atorvastatin 20 milliGRAM(s) Oral at bedtime  azithromycin  IVPB 500 milliGRAM(s) IV Intermittent every 24 hours  cinacalcet 60 milliGRAM(s) Oral daily  dextrose 5%. 1000 milliLiter(s) (50 mL/Hr) IV Continuous <Continuous>  dextrose 50% Injectable 12.5 Gram(s) IV Push once  dextrose 50% Injectable 25 Gram(s) IV Push once  dextrose 50% Injectable 25 Gram(s) IV Push once  DULoxetine 60 milliGRAM(s) Oral daily  heparin  Injectable 5000 Unit(s) SubCutaneous every 12 hours  hydrALAZINE 100 milliGRAM(s) Oral every 8 hours  insulin detemir injectable (LEVEMIR) 5 Unit(s) SubCutaneous at bedtime  insulin lispro (HumaLOG) corrective regimen sliding scale   SubCutaneous three times a day before meals  insulin lispro (HumaLOG) corrective regimen sliding scale   SubCutaneous at bedtime  insulin lispro Injectable (HumaLOG) 3 Unit(s) SubCutaneous before dinner  insulin lispro Injectable (HumaLOG) 2 Unit(s) SubCutaneous before breakfast  insulin lispro Injectable (HumaLOG) 2 Unit(s) SubCutaneous before lunch  metoprolol succinate ER 50 milliGRAM(s) Oral daily  piperacillin/tazobactam IVPB. 3.375 Gram(s) IV Intermittent every 12 hours  sevelamer hydrochloride 800 milliGRAM(s) Oral three times a day with meals      Assessment:  - DKA  -lactic acidosis  -elevated cardiac enzymes, chest pain, non specific ekg changes  -CHF, volume overload. acute on chronic systolic HF  -possible PNA  -ischemic cardiomyopathy, cad s/p multiple stents  -esrd on hd  -PAD s/p prior intervention   -remote TIA      Recs:  -appreciate renal recs. s/p HD. currently appears euvolemic  -DKA resolved. f/u endo recs  -new onset chest pain. ekg with nonspecific ST changes. elevated hs-trop with negative delta. [facility]  -hx of prolonged qtc. avoid qtc prolonging meds  -LLE pain --> le venous duplex neg  -CT chest findings concerning for malignancy and possible left lower lobe pna. follows with dr drake and dr michael. scheduled for surgery at [facility] in 2 weeks. remains on abx for pna. s/p speech and swallow eval to r.o aspiration   -c/w asa, plavix, statin for ischemic cardiomyopathy/CAD/PAD if not contraindicated  -c/w toprol and hydralazine for ischemic cardiomyopathy and HTN   -dvt ppx        Over 25 minutes spent on total encounter; more than 50% of the visit was spent counseling and/or coordinating care by the attending physician.      Julián Biswas MD   Cardiovascular Disease  [phone]

## 2019-02-16 NOTE — PROGRESS NOTE ADULT - SUBJECTIVE AND OBJECTIVE BOX
[facility]   [phone]  DIALYSIS NOTE  Chief Complaint: ESRD/Ongoing hemodialysis requirement.     24 hour events/subjective:    lethargic when seen earlier.   c/o cp as well         ALLERGIES & MEDICATIONS  --------------------------------------------------------------------------------  Allergies    No Known Allergies    Intolerances      Standing Inpatient Medications  aspirin enteric coated 81 milliGRAM(s) Oral daily  atorvastatin 20 milliGRAM(s) Oral at bedtime  azithromycin  IVPB 500 milliGRAM(s) IV Intermittent every 24 hours  cinacalcet 60 milliGRAM(s) Oral daily  dextrose 5%. 1000 milliLiter(s) IV Continuous <Continuous>  dextrose 50% Injectable 12.5 Gram(s) IV Push once  dextrose 50% Injectable 25 Gram(s) IV Push once  dextrose 50% Injectable 25 Gram(s) IV Push once  DULoxetine 60 milliGRAM(s) Oral daily  heparin  Injectable 5000 Unit(s) SubCutaneous every 12 hours  hydrALAZINE 100 milliGRAM(s) Oral every 8 hours  insulin detemir injectable (LEVEMIR) 5 Unit(s) SubCutaneous at bedtime  insulin lispro (HumaLOG) corrective regimen sliding scale   SubCutaneous three times a day before meals  insulin lispro (HumaLOG) corrective regimen sliding scale   SubCutaneous at bedtime  insulin lispro Injectable (HumaLOG) 3 Unit(s) SubCutaneous before dinner  insulin lispro Injectable (HumaLOG) 2 Unit(s) SubCutaneous before breakfast  insulin lispro Injectable (HumaLOG) 2 Unit(s) SubCutaneous before lunch  metoprolol succinate ER 50 milliGRAM(s) Oral daily  piperacillin/tazobactam IVPB. 3.375 Gram(s) IV Intermittent every 12 hours  sevelamer hydrochloride 800 milliGRAM(s) Oral three times a day with meals    PRN Inpatient Medications  dextrose 40% Gel 15 Gram(s) Oral once PRN  glucagon  Injectable 1 milliGRAM(s) IntraMuscular once PRN  HYDROmorphone  Injectable 0.5 milliGRAM(s) IV Push every 8 hours PRN  nitroglycerin     SubLingual 0.4 milliGRAM(s) SubLingual every 5 minutes PRN      REVIEW OF SYSTEMS  --------------------------------------------------------------------------------  no itching or rash  no fever or chill  no  palp  + cp   no sob or cough   no N/V/D/ no abd pain   ext no edema         VITALS/PHYSICAL EXAM  --------------------------------------------------------------------------------  T(C): 36.4 (02-16-19 @ 13:08), Max: 36.8 (02-15-19 @ 20:39)  HR: 74 (02-16-19 @ 13:08) (74 - 80)  BP: 141/83 (02-16-19 @ 13:08) (123/69 - 157/77)  RR: 18 (02-16-19 @ 13:08) (18 - 18)  SpO2: 95% (02-16-19 @ 13:08) (95% - 99%)  Wt(kg): --        02-15-19 @ 07:01  -  02-16-19 @ 07:00  --------------------------------------------------------  IN: 1170 mL / OUT: 0 mL / NET: 1170 mL      Physical Exam:  	  Gen: Non toxic ill appearing muscle wasting  + reproducible sternal  pain   	no jvd , supple neck,   	Pulm: decrease bs  no rales or ronchi or wheezing  	CV: RRR, S1S2; no rub  	Abd: +BS, soft, nontender/nondistended  	: No suprapubic tenderness  	UE: Warm, no cyanosis  no clubbing,  no edema  	LE: Warm, no cyanosis  no clubbing, 1-2 + pitting zo	        LABS/STUDIES  --------------------------------------------------------------------------------              9.9    4.63  >-----------<  263      [02-16-19 @ 09:18]              31.2     132  |  90  |  23  ----------------------------<  163      [02-16-19 @ 07:35]  4.8   |  24  |  4.74        Ca     6.9     [02-16-19 @ 07:35]                [02-16-19 @ 07:35]

## 2019-02-16 NOTE — PROVIDER CONTACT NOTE (OTHER) - ASSESSMENT
Patient alert and oriented x 4, asymptomatic, able to take po- given juice and cookies. Repeat FS 68- NP Miranda aware given orders to give 1/2 amp D5. Repeat .

## 2019-02-16 NOTE — PROVIDER CONTACT NOTE (OTHER) - DATE AND TIME:
12-Feb-2019 01:20
12-Feb-2019 08:00
13-Feb-2019 20:40
16-Feb-2019 06:45
16-Feb-2019 11:17
15-Feb-2019 16:00

## 2019-02-16 NOTE — PROVIDER CONTACT NOTE (OTHER) - ASSESSMENT
patient sitting in Bed, c/o chest pains, SOB. denies palpitations,nausea, vomiting, dizziness. V/S T98.2, P76, B/P 130/72, R18 ,Pox 98% RA

## 2019-02-16 NOTE — PROVIDER CONTACT NOTE (OTHER) - ACTION/TREATMENT ORDERED:
NP Cino at bedside. EKG, stat Troponin/BMP , nitrol SL x 3. Cardiology and primary MD notified. awaiting further orders.
SARAH Miranda notified, Blood glucose corrected with 1/2 amp D5. Will continue to monitor.
hypoglycemia protocol followed. f/u FS post protocol 150. will continue to monitor patient
EKG, Labs completed. Nitroglycerin sublin 0.4mg x1 was given .pain resolved. Pt resting comfortable in pt Bed. will continue to monitor.
NP aware. STAT labs ordered. unable to give NS bolus due to BNP of 70k. will f/u lab results and determine appropriate intervention.
PA ordered dopplers LE and tylenol ivpb.

## 2019-02-16 NOTE — PROGRESS NOTE ADULT - SUBJECTIVE AND OBJECTIVE BOX
Chief Complaint: f/u DM1    History:  Patients states that she has been having nausea and vomiting for the last 24 hours. Did not receive pre-meal Humalog at lunch and dinner yesterday. No abdominal pain. Is only eating about 1/2 her meals when she does eat.    MEDICATIONS  (STANDING):  aspirin enteric coated 81 milliGRAM(s) Oral daily  atorvastatin 20 milliGRAM(s) Oral at bedtime  azithromycin  IVPB 500 milliGRAM(s) IV Intermittent every 24 hours  cinacalcet 60 milliGRAM(s) Oral daily  dextrose 5%. 1000 milliLiter(s) (50 mL/Hr) IV Continuous <Continuous>  dextrose 50% Injectable 12.5 Gram(s) IV Push once  dextrose 50% Injectable 25 Gram(s) IV Push once  dextrose 50% Injectable 25 Gram(s) IV Push once  DULoxetine 60 milliGRAM(s) Oral daily  heparin  Injectable 5000 Unit(s) SubCutaneous every 12 hours  hydrALAZINE 100 milliGRAM(s) Oral every 8 hours  insulin detemir injectable (LEVEMIR) 5 Unit(s) SubCutaneous at bedtime  insulin lispro (HumaLOG) corrective regimen sliding scale   SubCutaneous three times a day before meals  insulin lispro (HumaLOG) corrective regimen sliding scale   SubCutaneous at bedtime  insulin lispro Injectable (HumaLOG) 3 Unit(s) SubCutaneous before dinner  insulin lispro Injectable (HumaLOG) 2 Unit(s) SubCutaneous before breakfast  insulin lispro Injectable (HumaLOG) 2 Unit(s) SubCutaneous before lunch  metoprolol succinate ER 50 milliGRAM(s) Oral daily  piperacillin/tazobactam IVPB. 3.375 Gram(s) IV Intermittent every 12 hours  sevelamer hydrochloride 800 milliGRAM(s) Oral three times a day with meals    MEDICATIONS  (PRN):  dextrose 40% Gel 15 Gram(s) Oral once PRN Blood Glucose LESS THAN 70 milliGRAM(s)/deciliter  glucagon  Injectable 1 milliGRAM(s) IntraMuscular once PRN Glucose LESS THAN 70 milligrams/deciliter  nitroglycerin     SubLingual 0.4 milliGRAM(s) SubLingual every 5 minutes PRN Chest Pain      Allergies  No Known Allergies    PHYSICAL EXAM:  VITALS: T(C): 36.8 (02-15-19 @ 20:39)  T(F): 98.2 (02-15-19 @ 20:39), Max: 98.2 (02-15-19 @ 20:39)  HR: 76 (02-16-19 @ 06:38) (74 - 80)  BP: 130/72 (02-16-19 @ 06:38) (122/75 - 162/78)  RR:  (18 - 18)  SpO2:  (96% - 98%)  Wt(kg): --  GENERAL: NAD, well-developed  EYES: No proptosis, anicteric  HEENT:  Atraumatic, Normocephalic  GI: Soft, nontender, non distended  PSYCH: Alert and oriented x 3, reactive affect    POCT Blood Glucose.: 171 mg/dL (02-16-19 @ 07:40) H 2  POCT Blood Glucose.: 228 mg/dL (02-15-19 @ 21:11) L 5  POCT Blood Glucose.: 108 mg/dL (02-15-19 @ 16:40) no humalog  POCT Blood Glucose.: 160 mg/dL (02-15-19 @ 12:03) no humalog   POCT Blood Glucose.: 32 mg/dL (02-15-19 @ 11:41)  POCT Blood Glucose.: 34 mg/dL (02-15-19 @ 11:40)  POCT Blood Glucose.: 99 mg/dL (02-15-19 @ 07:25)  POCT Blood Glucose.: 183 mg/dL (02-14-19 @ 22:53)  POCT Blood Glucose.: 149 mg/dL (02-14-19 @ 16:36)  POCT Blood Glucose.: 88 mg/dL (02-14-19 @ 12:47)  POCT Blood Glucose.: 89 mg/dL (02-14-19 @ 07:28)  POCT Blood Glucose.: 269 mg/dL (02-13-19 @ 21:09)  POCT Blood Glucose.: 77 mg/dL (02-13-19 @ 17:02)  POCT Blood Glucose.: 139 mg/dL (02-13-19 @ 12:02)      02-16    132<L>  |  90<L>  |  23  ----------------------------<  163<H>  4.8   |  24  |  4.74<H>    EGFR if : 11<L>  EGFR if non : 9<L>    Ca    6.9<L>      02-16        Hemoglobin A1C, Whole Blood: 7.6 % <H> [4.0 - 5.6] (02-13-19 @ 08:42)

## 2019-02-16 NOTE — PROVIDER CONTACT NOTE (OTHER) - BACKGROUND
64 yo female with ESRD admitted for PNA.
64 yo female admitted for PNA with ESRD on HD, recently identified lung tumor (suspect malignant)
ESRD, on HD Trops elevated 253,  Tele monitoring SR
PT came in with PNA. Pt has a hx of ESRD, CHF, CAD, DM type 1, ACS, TIA, and gout.
admitted for PNA, DKA on admission. hx of ESRD, DM
pt admitted with fluid overload- sudden onset SOB.

## 2019-02-17 LAB
GLUCOSE BLDC GLUCOMTR-MCNC: 103 MG/DL — HIGH (ref 70–99)
GLUCOSE BLDC GLUCOMTR-MCNC: 127 MG/DL — HIGH (ref 70–99)
GLUCOSE BLDC GLUCOMTR-MCNC: 146 MG/DL — HIGH (ref 70–99)
GLUCOSE BLDC GLUCOMTR-MCNC: 167 MG/DL — HIGH (ref 70–99)

## 2019-02-17 PROCEDURE — 71045 X-RAY EXAM CHEST 1 VIEW: CPT | Mod: 26

## 2019-02-17 PROCEDURE — 99233 SBSQ HOSP IP/OBS HIGH 50: CPT

## 2019-02-17 RX ADMIN — SEVELAMER CARBONATE 800 MILLIGRAM(S): 2400 POWDER, FOR SUSPENSION ORAL at 17:24

## 2019-02-17 RX ADMIN — Medication 50 MILLIGRAM(S): at 05:18

## 2019-02-17 RX ADMIN — ATORVASTATIN CALCIUM 20 MILLIGRAM(S): 80 TABLET, FILM COATED ORAL at 21:34

## 2019-02-17 RX ADMIN — Medication 100 MILLIGRAM(S): at 14:21

## 2019-02-17 RX ADMIN — HYDROMORPHONE HYDROCHLORIDE 0.5 MILLIGRAM(S): 2 INJECTION INTRAMUSCULAR; INTRAVENOUS; SUBCUTANEOUS at 15:52

## 2019-02-17 RX ADMIN — PIPERACILLIN AND TAZOBACTAM 25 GRAM(S): 4; .5 INJECTION, POWDER, LYOPHILIZED, FOR SOLUTION INTRAVENOUS at 20:18

## 2019-02-17 RX ADMIN — HYDROMORPHONE HYDROCHLORIDE 0.5 MILLIGRAM(S): 2 INJECTION INTRAMUSCULAR; INTRAVENOUS; SUBCUTANEOUS at 23:44

## 2019-02-17 RX ADMIN — Medication 2 UNIT(S): at 12:01

## 2019-02-17 RX ADMIN — HYDROMORPHONE HYDROCHLORIDE 0.5 MILLIGRAM(S): 2 INJECTION INTRAMUSCULAR; INTRAVENOUS; SUBCUTANEOUS at 17:00

## 2019-02-17 RX ADMIN — Medication 81 MILLIGRAM(S): at 12:02

## 2019-02-17 RX ADMIN — PIPERACILLIN AND TAZOBACTAM 25 GRAM(S): 4; .5 INJECTION, POWDER, LYOPHILIZED, FOR SOLUTION INTRAVENOUS at 07:43

## 2019-02-17 RX ADMIN — CINACALCET 60 MILLIGRAM(S): 30 TABLET, FILM COATED ORAL at 12:02

## 2019-02-17 RX ADMIN — HYDROMORPHONE HYDROCHLORIDE 0.5 MILLIGRAM(S): 2 INJECTION INTRAMUSCULAR; INTRAVENOUS; SUBCUTANEOUS at 00:23

## 2019-02-17 RX ADMIN — Medication 100 MILLIGRAM(S): at 21:34

## 2019-02-17 RX ADMIN — DULOXETINE HYDROCHLORIDE 60 MILLIGRAM(S): 30 CAPSULE, DELAYED RELEASE ORAL at 12:02

## 2019-02-17 RX ADMIN — Medication 100 MILLIGRAM(S): at 05:18

## 2019-02-17 RX ADMIN — AZITHROMYCIN 250 MILLIGRAM(S): 500 TABLET, FILM COATED ORAL at 23:44

## 2019-02-17 RX ADMIN — SEVELAMER CARBONATE 800 MILLIGRAM(S): 2400 POWDER, FOR SUSPENSION ORAL at 07:44

## 2019-02-17 RX ADMIN — Medication 2 UNIT(S): at 07:44

## 2019-02-17 RX ADMIN — HEPARIN SODIUM 5000 UNIT(S): 5000 INJECTION INTRAVENOUS; SUBCUTANEOUS at 17:24

## 2019-02-17 RX ADMIN — Medication 5 UNIT(S): at 21:34

## 2019-02-17 RX ADMIN — SEVELAMER CARBONATE 800 MILLIGRAM(S): 2400 POWDER, FOR SUSPENSION ORAL at 12:02

## 2019-02-17 NOTE — PROGRESS NOTE ADULT - SUBJECTIVE AND OBJECTIVE BOX
Chief Complaint: f/u DM1    History:  Patient states that she did not eat breakfast yesterday, had hypoglycemia before lunch yesterday after receiving Humalog for breakfast yesterday. States that she did not eat yesterday due to nausea and vomiting. Had breakfast today.     MEDICATIONS  (STANDING):  aspirin enteric coated 81 milliGRAM(s) Oral daily  atorvastatin 20 milliGRAM(s) Oral at bedtime  azithromycin  IVPB 500 milliGRAM(s) IV Intermittent every 24 hours  cinacalcet 60 milliGRAM(s) Oral daily  dextrose 5%. 1000 milliLiter(s) (50 mL/Hr) IV Continuous <Continuous>  dextrose 50% Injectable 12.5 Gram(s) IV Push once  dextrose 50% Injectable 25 Gram(s) IV Push once  dextrose 50% Injectable 25 Gram(s) IV Push once  DULoxetine 60 milliGRAM(s) Oral daily  heparin  Injectable 5000 Unit(s) SubCutaneous every 12 hours  hydrALAZINE 100 milliGRAM(s) Oral every 8 hours  insulin detemir injectable (LEVEMIR) 5 Unit(s) SubCutaneous at bedtime  insulin lispro (HumaLOG) corrective regimen sliding scale   SubCutaneous three times a day before meals  insulin lispro (HumaLOG) corrective regimen sliding scale   SubCutaneous at bedtime  insulin lispro Injectable (HumaLOG) 3 Unit(s) SubCutaneous before dinner  insulin lispro Injectable (HumaLOG) 2 Unit(s) SubCutaneous before breakfast  insulin lispro Injectable (HumaLOG) 2 Unit(s) SubCutaneous before lunch  metoprolol succinate ER 50 milliGRAM(s) Oral daily  piperacillin/tazobactam IVPB. 3.375 Gram(s) IV Intermittent every 12 hours  sevelamer hydrochloride 800 milliGRAM(s) Oral three times a day with meals    MEDICATIONS  (PRN):  dextrose 40% Gel 15 Gram(s) Oral once PRN Blood Glucose LESS THAN 70 milliGRAM(s)/deciliter  glucagon  Injectable 1 milliGRAM(s) IntraMuscular once PRN Glucose LESS THAN 70 milligrams/deciliter  HYDROmorphone  Injectable 0.5 milliGRAM(s) IV Push every 8 hours PRN Moderate Pain (4 - 6)  nitroglycerin     SubLingual 0.4 milliGRAM(s) SubLingual every 5 minutes PRN Chest Pain      Allergies  No Known Allergies    PHYSICAL EXAM:  VITALS: T(C): 36.4 (02-17-19 @ 11:50)  T(F): 97.6 (02-17-19 @ 11:50), Max: 98.3 (02-16-19 @ 20:52)  HR: 82 (02-17-19 @ 13:56) (62 - 83)  BP: 131/82 (02-17-19 @ 13:56) (118/64 - 153/80)  RR:  (18 - 19)  SpO2:  (90% - 97%)  Wt(kg): --  GENERAL: NAD  RESPIRATORY: Clear to auscultation bilaterally  CARDIOVASCULAR: Regular rate and rhythm; No murmurs  GI: Soft, nontender, non distended    POCT Blood Glucose.: 127 mg/dL (02-17-19 @ 11:45)  POCT Blood Glucose.: 146 mg/dL (02-17-19 @ 07:17)  POCT Blood Glucose.: 197 mg/dL (02-16-19 @ 21:02)  POCT Blood Glucose.: 136 mg/dL (02-16-19 @ 12:08)  POCT Blood Glucose.: 68 mg/dL (02-16-19 @ 11:33)  POCT Blood Glucose.: 64 mg/dL (02-16-19 @ 11:17)  POCT Blood Glucose.: 171 mg/dL (02-16-19 @ 07:40)  POCT Blood Glucose.: 228 mg/dL (02-15-19 @ 21:11)  POCT Blood Glucose.: 108 mg/dL (02-15-19 @ 16:40)  POCT Blood Glucose.: 160 mg/dL (02-15-19 @ 12:03)  POCT Blood Glucose.: 32 mg/dL (02-15-19 @ 11:41)  POCT Blood Glucose.: 34 mg/dL (02-15-19 @ 11:40)  POCT Blood Glucose.: 99 mg/dL (02-15-19 @ 07:25)  POCT Blood Glucose.: 183 mg/dL (02-14-19 @ 22:53)  POCT Blood Glucose.: 149 mg/dL (02-14-19 @ 16:36)      02-16    132<L>  |  90<L>  |  23  ----------------------------<  163<H>  4.8   |  24  |  4.74<H>    EGFR if : 11<L>  EGFR if non : 9<L>    Ca    6.9<L>      02-16        Hemoglobin A1C, Whole Blood: 7.6 % <H> [4.0 - 5.6] (02-13-19 @ 08:42)

## 2019-02-17 NOTE — PROGRESS NOTE ADULT - SUBJECTIVE AND OBJECTIVE BOX
Cardiovascular Disease Progress Note    Overnight events: No acute events overnight.  no further episodes of cp. denies any other cardiac sx  Otherwise review of systems negative    Objective Findings:  T(C): 36.8 (19 @ 04:04), Max: 36.8 (19 @ 11:28)  HR: 78 (19 @ 05:17) (62 - 83)  BP: 141/71 (19 @ 05:17) (118/64 - 153/80)  RR: 18 (19 @ 04:04) (18 - 19)  SpO2: 97% (19 @ 04:04) (93% - 99%)  Wt(kg): --  Daily     Daily Weight in k.4 (2019 18:40)      Physical Exam:  Gen: NAD  HEENT: EOMI  CV: RRR, normal S1 + S2, no m/r/g  Lungs: CTAB  Abd: soft, non-tender  Ext: No edema    Telemetry: nsr    Laboratory Data:                        9.9    4.63  )-----------( 263      ( 2019 09:18 )             31.2     02-16    132<L>  |  90<L>  |  23  ----------------------------<  163<H>  4.8   |  24  |  4.74<H>    Ca    6.9<L>      2019 07:35        CARDIAC MARKERS ( 2019 07:35 )  x     / x     / 119 U/L / x     / 2.6 ng/mL  CARDIAC MARKERS ( 15 Feb 2019 21:08 )  x     / x     / 113 U/L / x     / 2.4 ng/mL          Inpatient Medications:  MEDICATIONS  (STANDING):  aspirin enteric coated 81 milliGRAM(s) Oral daily  atorvastatin 20 milliGRAM(s) Oral at bedtime  azithromycin  IVPB 500 milliGRAM(s) IV Intermittent every 24 hours  cinacalcet 60 milliGRAM(s) Oral daily  dextrose 5%. 1000 milliLiter(s) (50 mL/Hr) IV Continuous <Continuous>  dextrose 50% Injectable 12.5 Gram(s) IV Push once  dextrose 50% Injectable 25 Gram(s) IV Push once  dextrose 50% Injectable 25 Gram(s) IV Push once  DULoxetine 60 milliGRAM(s) Oral daily  heparin  Injectable 5000 Unit(s) SubCutaneous every 12 hours  hydrALAZINE 100 milliGRAM(s) Oral every 8 hours  insulin detemir injectable (LEVEMIR) 5 Unit(s) SubCutaneous at bedtime  insulin lispro (HumaLOG) corrective regimen sliding scale   SubCutaneous three times a day before meals  insulin lispro (HumaLOG) corrective regimen sliding scale   SubCutaneous at bedtime  insulin lispro Injectable (HumaLOG) 3 Unit(s) SubCutaneous before dinner  insulin lispro Injectable (HumaLOG) 2 Unit(s) SubCutaneous before breakfast  insulin lispro Injectable (HumaLOG) 2 Unit(s) SubCutaneous before lunch  metoprolol succinate ER 50 milliGRAM(s) Oral daily  piperacillin/tazobactam IVPB. 3.375 Gram(s) IV Intermittent every 12 hours  sevelamer hydrochloride 800 milliGRAM(s) Oral three times a day with meals      Assessment:  - DKA  -lactic acidosis  -elevated cardiac enzymes, chest pain, non specific ekg changes  -CHF, volume overload. acute on chronic systolic HF  -possible PNA  -ischemic cardiomyopathy, cad s/p multiple stents  -esrd on hd  -PAD s/p prior intervention   -remote TIA      Recs:  -appreciate renal recs. s/p HD. currently appears euvolemic  -DKA resolved. f/u endo recs  -new onset atypical chest pain. ekg with nonspecific ST changes. elevated hs-trop with negative delta. plan for LHC with Dr Vela on   -hx of prolonged qtc. avoid qtc prolonging meds  -LLE pain --> le venous duplex neg  -CT chest findings concerning for malignancy and possible left lower lobe pna. follows with dr drake and dr mihcael. scheduled for surgery at [facility] in 2 weeks. remains on abx for pna. s/p speech and swallow eval to r.o aspiration   -c/w asa, plavix, statin for ischemic cardiomyopathy/CAD/PAD if not contraindicated  -c/w toprol and hydralazine for ischemic cardiomyopathy and HTN   -dvt ppx        Over 25 minutes spent on total encounter; more than 50% of the visit was spent counseling and/or coordinating care by the attending physician.      Julián Biswas MD   Cardiovascular Disease  [phone]

## 2019-02-17 NOTE — PROGRESS NOTE ADULT - SUBJECTIVE AND OBJECTIVE BOX
PULMONARY PROGRESS NOTE    NATHAN MEEKS  MRN-[id number]    Patient is a 63y old  Female who presents with a chief complaint of DKA/Hyperkalemia/Pneumonia (16 Feb 2019 15:33)      HPI: No resp complaint  Not SOB  -  -  ACTIVE MEDICATION LIST:  MEDICATIONS  (STANDING):  aspirin enteric coated 81 milliGRAM(s) Oral daily  atorvastatin 20 milliGRAM(s) Oral at bedtime  azithromycin  IVPB 500 milliGRAM(s) IV Intermittent every 24 hours  cinacalcet 60 milliGRAM(s) Oral daily  dextrose 5%. 1000 milliLiter(s) (50 mL/Hr) IV Continuous <Continuous>  dextrose 50% Injectable 12.5 Gram(s) IV Push once  dextrose 50% Injectable 25 Gram(s) IV Push once  dextrose 50% Injectable 25 Gram(s) IV Push once  DULoxetine 60 milliGRAM(s) Oral daily  heparin  Injectable 5000 Unit(s) SubCutaneous every 12 hours  hydrALAZINE 100 milliGRAM(s) Oral every 8 hours  insulin detemir injectable (LEVEMIR) 5 Unit(s) SubCutaneous at bedtime  insulin lispro (HumaLOG) corrective regimen sliding scale   SubCutaneous three times a day before meals  insulin lispro (HumaLOG) corrective regimen sliding scale   SubCutaneous at bedtime  insulin lispro Injectable (HumaLOG) 3 Unit(s) SubCutaneous before dinner  insulin lispro Injectable (HumaLOG) 2 Unit(s) SubCutaneous before breakfast  insulin lispro Injectable (HumaLOG) 2 Unit(s) SubCutaneous before lunch  metoprolol succinate ER 50 milliGRAM(s) Oral daily  piperacillin/tazobactam IVPB. 3.375 Gram(s) IV Intermittent every 12 hours  sevelamer hydrochloride 800 milliGRAM(s) Oral three times a day with meals    MEDICATIONS  (PRN):  dextrose 40% Gel 15 Gram(s) Oral once PRN Blood Glucose LESS THAN 70 milliGRAM(s)/deciliter  glucagon  Injectable 1 milliGRAM(s) IntraMuscular once PRN Glucose LESS THAN 70 milligrams/deciliter  HYDROmorphone  Injectable 0.5 milliGRAM(s) IV Push every 8 hours PRN Moderate Pain (4 - 6)  nitroglycerin     SubLingual 0.4 milliGRAM(s) SubLingual every 5 minutes PRN Chest Pain      EXAM:  Vital Signs Last 24 Hrs  T(C): 36.8 (17 Feb 2019 04:04), Max: 36.8 (16 Feb 2019 11:28)  T(F): 98.2 (17 Feb 2019 04:04), Max: 98.3 (16 Feb 2019 11:28)  HR: 78 (17 Feb 2019 05:17) (62 - 83)  BP: 141/71 (17 Feb 2019 05:17) (118/64 - 153/80)  BP(mean): 82 (16 Feb 2019 20:52) (82 - 82)  RR: 18 (17 Feb 2019 04:04) (18 - 19)  SpO2: 97% (17 Feb 2019 04:04) (93% - 99%)    GENERAL: The patient is awake and alert in no apparent distress.     LUNGS: Clear to auscultation without wheezing, rales or rhonchi; respirations unlabored    HEART: Regular rate and rhythm without murmur.                            9.9    4.63  )-----------( 263      ( 16 Feb 2019 09:18 )             31.2       02-16    132<L>  |  90<L>  |  23  ----------------------------<  163<H>  4.8   |  24  |  4.74<H>    Ca    6.9<L>      16 Feb 2019 07:35          PROBLEM LIST:  63y Female with HEALTH ISSUES - PROBLEM Dx:  Type 1 diabetes mellitus with hypoglycemia and without coma: Type 1 diabetes mellitus with hypoglycemia and without coma  Type 1 diabetes mellitus with diabetic peripheral angiopathy without gangrene: Type 1 diabetes mellitus with diabetic peripheral angiopathy without gangrene  ESRD (end stage renal disease): ESRD (end stage renal disease)  Hyperkalemia: Hyperkalemia  Pneumonia of left lower lobe due to infectious organism: Pneumonia of left lower lobe due to infectious organism  Diabetic ketoacidosis without coma associated with type 1 diabetes mellitus: Diabetic ketoacidosis without coma associated with type 1 diabetes mellitus      RECS:  Repeat CXR  consider d/c antibiotics  trial off 02        Harvinder Bean MD  [phone]

## 2019-02-17 NOTE — PROGRESS NOTE ADULT - SUBJECTIVE AND OBJECTIVE BOX
Patient is a 63y old  Female who presents with a chief complaint of DKA/Hyperkalemia/Pneumonia (17 Feb 2019 15:22)      SUBJECTIVE / OVERNIGHT EVENTS: feels better  Review of Systems  chest pain no  palpitations no  sob no  nausea no  headache no    MEDICATIONS  (STANDING):  aspirin enteric coated 81 milliGRAM(s) Oral daily  atorvastatin 20 milliGRAM(s) Oral at bedtime  azithromycin  IVPB 500 milliGRAM(s) IV Intermittent every 24 hours  cinacalcet 60 milliGRAM(s) Oral daily  dextrose 5%. 1000 milliLiter(s) (50 mL/Hr) IV Continuous <Continuous>  dextrose 50% Injectable 12.5 Gram(s) IV Push once  dextrose 50% Injectable 25 Gram(s) IV Push once  dextrose 50% Injectable 25 Gram(s) IV Push once  DULoxetine 60 milliGRAM(s) Oral daily  heparin  Injectable 5000 Unit(s) SubCutaneous every 12 hours  hydrALAZINE 100 milliGRAM(s) Oral every 8 hours  insulin detemir injectable (LEVEMIR) 5 Unit(s) SubCutaneous at bedtime  insulin lispro (HumaLOG) corrective regimen sliding scale   SubCutaneous three times a day before meals  insulin lispro (HumaLOG) corrective regimen sliding scale   SubCutaneous at bedtime  insulin lispro Injectable (HumaLOG) 3 Unit(s) SubCutaneous before dinner  insulin lispro Injectable (HumaLOG) 2 Unit(s) SubCutaneous before breakfast  insulin lispro Injectable (HumaLOG) 2 Unit(s) SubCutaneous before lunch  metoprolol succinate ER 50 milliGRAM(s) Oral daily  piperacillin/tazobactam IVPB. 3.375 Gram(s) IV Intermittent every 12 hours  sevelamer hydrochloride 800 milliGRAM(s) Oral three times a day with meals    MEDICATIONS  (PRN):  dextrose 40% Gel 15 Gram(s) Oral once PRN Blood Glucose LESS THAN 70 milliGRAM(s)/deciliter  glucagon  Injectable 1 milliGRAM(s) IntraMuscular once PRN Glucose LESS THAN 70 milligrams/deciliter  HYDROmorphone  Injectable 0.5 milliGRAM(s) IV Push every 8 hours PRN Moderate Pain (4 - 6)  nitroglycerin     SubLingual 0.4 milliGRAM(s) SubLingual every 5 minutes PRN Chest Pain      Vital Signs Last 24 Hrs  T(C): 36.4 (17 Feb 2019 11:50), Max: 36.8 (16 Feb 2019 20:52)  T(F): 97.6 (17 Feb 2019 11:50), Max: 98.3 (16 Feb 2019 20:52)  HR: 77 (17 Feb 2019 15:53) (62 - 83)  BP: 133/76 (17 Feb 2019 15:53) (118/64 - 153/80)  BP(mean): 93 (17 Feb 2019 11:50) (82 - 93)  RR: 18 (17 Feb 2019 12:08) (18 - 19)  SpO2: 93% (17 Feb 2019 12:08) (90% - 97%)    PHYSICAL EXAM:  GENERAL: NAD, well-developed  HEAD:  Atraumatic, Normocephalic  EYES: EOMI, PERRLA, conjunctiva and sclera clear  NECK: Supple, No JVD  CHEST/LUNG: Clear to auscultation bilaterally; No wheeze  HEART: Regular rate and rhythm; No murmurs, rubs, or gallops  ABDOMEN: Soft, Nontender, Nondistended; Bowel sounds present  EXTREMITIES:  2+ Peripheral Pulses, No clubbing, cyanosis, or edema  PSYCH: AAOx3  NEUROLOGY: non-focal  SKIN: No rashes or lesions    LABS:                        9.9    4.63  )-----------( 263      ( 16 Feb 2019 09:18 )             31.2     02-16    132<L>  |  90<L>  |  23  ----------------------------<  163<H>  4.8   |  24  |  4.74<H>    Ca    6.9<L>      16 Feb 2019 07:35        CARDIAC MARKERS ( 16 Feb 2019 07:35 )  x     / x     / 119 U/L / x     / 2.6 ng/mL  CARDIAC MARKERS ( 15 Feb 2019 21:08 )  x     / x     / 113 U/L / x     / 2.4 ng/mL            RADIOLOGY & ADDITIONAL TESTS:    Imaging Personally Reviewed:    Consultant(s) Notes Reviewed:      Care Discussed with Consultants/Other Providers:

## 2019-02-18 LAB
ANION GAP SERPL CALC-SCNC: 19 MMOL/L — HIGH (ref 5–17)
BUN SERPL-MCNC: 18 MG/DL — SIGNIFICANT CHANGE UP (ref 7–23)
CALCIUM SERPL-MCNC: 7.3 MG/DL — LOW (ref 8.4–10.5)
CHLORIDE SERPL-SCNC: 93 MMOL/L — LOW (ref 96–108)
CO2 SERPL-SCNC: 22 MMOL/L — SIGNIFICANT CHANGE UP (ref 22–31)
CREAT SERPL-MCNC: 4.56 MG/DL — HIGH (ref 0.5–1.3)
GLUCOSE BLDC GLUCOMTR-MCNC: 100 MG/DL — HIGH (ref 70–99)
GLUCOSE BLDC GLUCOMTR-MCNC: 105 MG/DL — HIGH (ref 70–99)
GLUCOSE BLDC GLUCOMTR-MCNC: 109 MG/DL — HIGH (ref 70–99)
GLUCOSE BLDC GLUCOMTR-MCNC: 171 MG/DL — HIGH (ref 70–99)
GLUCOSE BLDC GLUCOMTR-MCNC: 191 MG/DL — HIGH (ref 70–99)
GLUCOSE SERPL-MCNC: 140 MG/DL — HIGH (ref 70–99)
HCT VFR BLD CALC: 30.8 % — LOW (ref 34.5–45)
HGB BLD-MCNC: 9.6 G/DL — LOW (ref 11.5–15.5)
MAGNESIUM SERPL-MCNC: 2.1 MG/DL — SIGNIFICANT CHANGE UP (ref 1.6–2.6)
MCHC RBC-ENTMCNC: 31.2 GM/DL — LOW (ref 32–36)
MCHC RBC-ENTMCNC: 32.7 PG — SIGNIFICANT CHANGE UP (ref 27–34)
MCV RBC AUTO: 104.8 FL — HIGH (ref 80–100)
PHOSPHATE SERPL-MCNC: 3.6 MG/DL — SIGNIFICANT CHANGE UP (ref 2.5–4.5)
PLATELET # BLD AUTO: 226 K/UL — SIGNIFICANT CHANGE UP (ref 150–400)
POTASSIUM SERPL-MCNC: 3.9 MMOL/L — SIGNIFICANT CHANGE UP (ref 3.5–5.3)
POTASSIUM SERPL-SCNC: 3.9 MMOL/L — SIGNIFICANT CHANGE UP (ref 3.5–5.3)
RBC # BLD: 2.94 M/UL — LOW (ref 3.8–5.2)
RBC # FLD: 14.8 % — HIGH (ref 10.3–14.5)
SODIUM SERPL-SCNC: 133 MMOL/L — LOW (ref 135–145)
TROPONIN T, HIGH SENSITIVITY RESULT: 202 NG/L — HIGH (ref 0–51)
WBC # BLD: 4.61 K/UL — SIGNIFICANT CHANGE UP (ref 3.8–10.5)
WBC # FLD AUTO: 4.61 K/UL — SIGNIFICANT CHANGE UP (ref 3.8–10.5)

## 2019-02-18 PROCEDURE — 99232 SBSQ HOSP IP/OBS MODERATE 35: CPT

## 2019-02-18 RX ADMIN — CINACALCET 60 MILLIGRAM(S): 30 TABLET, FILM COATED ORAL at 11:48

## 2019-02-18 RX ADMIN — SEVELAMER CARBONATE 800 MILLIGRAM(S): 2400 POWDER, FOR SUSPENSION ORAL at 08:05

## 2019-02-18 RX ADMIN — Medication 50 MILLIGRAM(S): at 05:10

## 2019-02-18 RX ADMIN — Medication 100 MILLIGRAM(S): at 21:16

## 2019-02-18 RX ADMIN — PIPERACILLIN AND TAZOBACTAM 25 GRAM(S): 4; .5 INJECTION, POWDER, LYOPHILIZED, FOR SOLUTION INTRAVENOUS at 08:04

## 2019-02-18 RX ADMIN — HYDROMORPHONE HYDROCHLORIDE 0.5 MILLIGRAM(S): 2 INJECTION INTRAMUSCULAR; INTRAVENOUS; SUBCUTANEOUS at 08:39

## 2019-02-18 RX ADMIN — SEVELAMER CARBONATE 800 MILLIGRAM(S): 2400 POWDER, FOR SUSPENSION ORAL at 11:48

## 2019-02-18 RX ADMIN — DULOXETINE HYDROCHLORIDE 60 MILLIGRAM(S): 30 CAPSULE, DELAYED RELEASE ORAL at 11:48

## 2019-02-18 RX ADMIN — Medication 2 UNIT(S): at 08:04

## 2019-02-18 RX ADMIN — ATORVASTATIN CALCIUM 20 MILLIGRAM(S): 80 TABLET, FILM COATED ORAL at 21:16

## 2019-02-18 RX ADMIN — Medication 5 UNIT(S): at 22:25

## 2019-02-18 RX ADMIN — HYDROMORPHONE HYDROCHLORIDE 0.5 MILLIGRAM(S): 2 INJECTION INTRAMUSCULAR; INTRAVENOUS; SUBCUTANEOUS at 08:09

## 2019-02-18 RX ADMIN — Medication 100 MILLIGRAM(S): at 13:15

## 2019-02-18 RX ADMIN — Medication 100 MILLIGRAM(S): at 05:10

## 2019-02-18 RX ADMIN — Medication 81 MILLIGRAM(S): at 11:48

## 2019-02-18 RX ADMIN — HYDROMORPHONE HYDROCHLORIDE 0.5 MILLIGRAM(S): 2 INJECTION INTRAMUSCULAR; INTRAVENOUS; SUBCUTANEOUS at 00:30

## 2019-02-18 RX ADMIN — Medication 2 UNIT(S): at 11:58

## 2019-02-18 NOTE — PROGRESS NOTE ADULT - SUBJECTIVE AND OBJECTIVE BOX
Cardiovascular Disease Progress Note    Overnight events: No acute events overnight.  no further episodes of cp. plan for [facility] tomm. no new cardiac sx  Otherwise review of systems negative    Objective Findings:  T(C): 36.6 (02-18-19 @ 05:08), Max: 37.1 (02-17-19 @ 20:33)  HR: 76 (02-18-19 @ 05:08) (72 - 82)  BP: 144/69 (02-18-19 @ 05:08) (110/68 - 160/86)  RR: 18 (02-18-19 @ 05:08) (18 - 19)  SpO2: 96% (02-18-19 @ 05:08) (90% - 97%)  Wt(kg): --  Daily     Daily       Physical Exam:  Gen: NAD  HEENT: EOMI  CV: RRR, normal S1 + S2, no m/r/g  Lungs: CTAB  Abd: soft, non-tender  Ext: No edema    Telemetry: nsr    Laboratory Data:                        9.9    4.63  )-----------( 263      ( 16 Feb 2019 09:18 )             31.2     02-18    133<L>  |  93<L>  |  18  ----------------------------<  140<H>  3.9   |  22  |  4.56<H>    Ca    7.3<L>      18 Feb 2019 05:42  Phos  3.6     02-18  Mg     2.1     02-18                Inpatient Medications:  MEDICATIONS  (STANDING):  aspirin enteric coated 81 milliGRAM(s) Oral daily  atorvastatin 20 milliGRAM(s) Oral at bedtime  azithromycin  IVPB 500 milliGRAM(s) IV Intermittent every 24 hours  cinacalcet 60 milliGRAM(s) Oral daily  dextrose 5%. 1000 milliLiter(s) (50 mL/Hr) IV Continuous <Continuous>  dextrose 50% Injectable 12.5 Gram(s) IV Push once  dextrose 50% Injectable 25 Gram(s) IV Push once  dextrose 50% Injectable 25 Gram(s) IV Push once  DULoxetine 60 milliGRAM(s) Oral daily  heparin  Injectable 5000 Unit(s) SubCutaneous every 12 hours  hydrALAZINE 100 milliGRAM(s) Oral every 8 hours  insulin detemir injectable (LEVEMIR) 5 Unit(s) SubCutaneous at bedtime  insulin lispro (HumaLOG) corrective regimen sliding scale   SubCutaneous three times a day before meals  insulin lispro (HumaLOG) corrective regimen sliding scale   SubCutaneous at bedtime  insulin lispro Injectable (HumaLOG) 3 Unit(s) SubCutaneous before dinner  insulin lispro Injectable (HumaLOG) 2 Unit(s) SubCutaneous before breakfast  insulin lispro Injectable (HumaLOG) 2 Unit(s) SubCutaneous before lunch  metoprolol succinate ER 50 milliGRAM(s) Oral daily  piperacillin/tazobactam IVPB. 3.375 Gram(s) IV Intermittent every 12 hours  sevelamer hydrochloride 800 milliGRAM(s) Oral three times a day with meals      Assessment:  - DKA  -lactic acidosis  -elevated cardiac enzymes, chest pain, non specific ekg changes  -CHF, volume overload. acute on chronic systolic HF  -possible PNA  -ischemic cardiomyopathy, cad s/p multiple stents  -esrd on hd  -PAD s/p prior intervention   -remote TIA      Recs:  -appreciate renal recs. s/p HD. currently appears euvolemic  -DKA resolved. f/u endo recs  -new onset atypical chest pain. ekg with nonspecific ST changes. elevated hs-trop with negative delta. plan for LHC with Dr Vela on tueday  -hx of prolonged qtc. avoid qtc prolonging meds  -CT chest findings concerning for malignancy and possible left lower lobe pna. follows with dr drake and dr michael. scheduled for surgery at [facility] in 1 week. remains on abx for pna. s/p speech and swallow eval to r.o aspiration   -c/w asa, plavix, statin for ischemic cardiomyopathy/CAD/PAD if not contraindicated  -c/w toprol and hydralazine for ischemic cardiomyopathy and HTN   -dvt ppx      Over 25 minutes spent on total encounter; more than 50% of the visit was spent counseling and/or coordinating care by the attending physician.      Julián Biswas MD   Cardiovascular Disease  [phone]

## 2019-02-18 NOTE — PROGRESS NOTE ADULT - SUBJECTIVE AND OBJECTIVE BOX
Patient is a 63y old  Female who presents with a chief complaint of DKA/Hyperkalemia/Pneumonia (18 Feb 2019 10:41)      SUBJECTIVE / OVERNIGHT EVENTS: Comfortable without new complaints.   Review of Systems  chest pain no  palpitations no  sob no  nausea no  headache no    MEDICATIONS  (STANDING):  aspirin enteric coated 81 milliGRAM(s) Oral daily  atorvastatin 20 milliGRAM(s) Oral at bedtime  azithromycin  IVPB 500 milliGRAM(s) IV Intermittent every 24 hours  cinacalcet 60 milliGRAM(s) Oral daily  dextrose 5%. 1000 milliLiter(s) (50 mL/Hr) IV Continuous <Continuous>  dextrose 50% Injectable 12.5 Gram(s) IV Push once  dextrose 50% Injectable 25 Gram(s) IV Push once  dextrose 50% Injectable 25 Gram(s) IV Push once  DULoxetine 60 milliGRAM(s) Oral daily  heparin  Injectable 5000 Unit(s) SubCutaneous every 12 hours  hydrALAZINE 100 milliGRAM(s) Oral every 8 hours  insulin detemir injectable (LEVEMIR) 5 Unit(s) SubCutaneous at bedtime  insulin lispro (HumaLOG) corrective regimen sliding scale   SubCutaneous three times a day before meals  insulin lispro (HumaLOG) corrective regimen sliding scale   SubCutaneous at bedtime  insulin lispro Injectable (HumaLOG) 3 Unit(s) SubCutaneous before dinner  insulin lispro Injectable (HumaLOG) 2 Unit(s) SubCutaneous before breakfast  insulin lispro Injectable (HumaLOG) 2 Unit(s) SubCutaneous before lunch  metoprolol succinate ER 50 milliGRAM(s) Oral daily  piperacillin/tazobactam IVPB. 3.375 Gram(s) IV Intermittent every 12 hours  sevelamer hydrochloride 800 milliGRAM(s) Oral three times a day with meals    MEDICATIONS  (PRN):  dextrose 40% Gel 15 Gram(s) Oral once PRN Blood Glucose LESS THAN 70 milliGRAM(s)/deciliter  glucagon  Injectable 1 milliGRAM(s) IntraMuscular once PRN Glucose LESS THAN 70 milligrams/deciliter  HYDROmorphone  Injectable 0.5 milliGRAM(s) IV Push every 8 hours PRN Moderate Pain (4 - 6)  nitroglycerin     SubLingual 0.4 milliGRAM(s) SubLingual every 5 minutes PRN Chest Pain      Vital Signs Last 24 Hrs  T(C): 36.6 (18 Feb 2019 05:08), Max: 37.1 (17 Feb 2019 20:33)  T(F): 97.9 (18 Feb 2019 05:08), Max: 98.7 (17 Feb 2019 20:33)  HR: 76 (18 Feb 2019 05:08) (72 - 82)  BP: 144/69 (18 Feb 2019 05:08) (110/68 - 160/86)  BP(mean): 93 (17 Feb 2019 11:50) (93 - 93)  RR: 18 (18 Feb 2019 05:08) (18 - 19)  SpO2: 96% (18 Feb 2019 05:08) (90% - 97%)    PHYSICAL EXAM:  GENERAL: NAD, well-developed  HEAD:  Atraumatic, Normocephalic  EYES: EOMI, PERRLA, conjunctiva and sclera clear  NECK: Supple, No JVD  CHEST/LUNG: few crackles to auscultation bilaterally; No wheeze  HEART: Regular rate and rhythm; No murmurs, rubs, or gallops  ABDOMEN: Soft, Nontender, Nondistended; Bowel sounds present  EXTREMITIES:  2+ Peripheral Pulses, No clubbing, cyanosis, or edema  PSYCH: AAOx3  NEUROLOGY: non-focal  SKIN: No rashes or lesions    LABS:                        9.6    4.61  )-----------( 226      ( 18 Feb 2019 09:25 )             30.8     02-18    133<L>  |  93<L>  |  18  ----------------------------<  140<H>  3.9   |  22  |  4.56<H>    Ca    7.3<L>      18 Feb 2019 05:42  Phos  3.6     02-18  Mg     2.1     02-18                  RADIOLOGY & ADDITIONAL TESTS:    Imaging Personally Reviewed:    Consultant(s) Notes Reviewed:      Care Discussed with Consultants/Other Providers:

## 2019-02-18 NOTE — PROGRESS NOTE ADULT - SUBJECTIVE AND OBJECTIVE BOX
[facility]   [phone]  RENAL FOLLOW UP NOTE  --------------------------------------------------------------------------------  Chief Complaint:    24 hour events/subjective:    seen earlier.   no cp when seen.   still c/o overall fatigue.       PAST HISTORY  --------------------------------------------------------------------------------  No significant changes to PMH, PSH, FHx, SHx, unless otherwise noted    ALLERGIES & MEDICATIONS  --------------------------------------------------------------------------------  Allergies    No Known Allergies    Intolerances      Standing Inpatient Medications  aspirin enteric coated 81 milliGRAM(s) Oral daily  atorvastatin 20 milliGRAM(s) Oral at bedtime  cinacalcet 60 milliGRAM(s) Oral daily  dextrose 5%. 1000 milliLiter(s) IV Continuous <Continuous>  dextrose 50% Injectable 12.5 Gram(s) IV Push once  dextrose 50% Injectable 25 Gram(s) IV Push once  dextrose 50% Injectable 25 Gram(s) IV Push once  DULoxetine 60 milliGRAM(s) Oral daily  heparin  Injectable 5000 Unit(s) SubCutaneous every 12 hours  hydrALAZINE 100 milliGRAM(s) Oral every 8 hours  insulin detemir injectable (LEVEMIR) 5 Unit(s) SubCutaneous at bedtime  insulin lispro (HumaLOG) corrective regimen sliding scale   SubCutaneous three times a day before meals  insulin lispro (HumaLOG) corrective regimen sliding scale   SubCutaneous at bedtime  insulin lispro Injectable (HumaLOG) 3 Unit(s) SubCutaneous before dinner  insulin lispro Injectable (HumaLOG) 2 Unit(s) SubCutaneous before breakfast  insulin lispro Injectable (HumaLOG) 2 Unit(s) SubCutaneous before lunch  metoprolol succinate ER 50 milliGRAM(s) Oral daily  sevelamer hydrochloride 800 milliGRAM(s) Oral three times a day with meals    PRN Inpatient Medications  dextrose 40% Gel 15 Gram(s) Oral once PRN  glucagon  Injectable 1 milliGRAM(s) IntraMuscular once PRN  HYDROmorphone  Injectable 0.5 milliGRAM(s) IV Push every 8 hours PRN  nitroglycerin     SubLingual 0.4 milliGRAM(s) SubLingual every 5 minutes PRN      REVIEW OF SYSTEMS  --------------------------------------------------------------------------------    Gen: denies fevers/chills,  CVS: denies chest pain/palpitations  Resp: denies SOB/Cough  GI: Denies N/V/Abd pain  : Denies dysuria    All other systems were reviewed and are negative, except as noted.    VITALS/PHYSICAL EXAM  --------------------------------------------------------------------------------  T(C): 36.7 (02-18-19 @ 20:39), Max: 36.7 (02-18-19 @ 20:39)  HR: 75 (02-18-19 @ 20:39) (72 - 76)  BP: 162/80 (02-18-19 @ 20:39) (110/68 - 162/80)  RR: 18 (02-18-19 @ 20:39) (18 - 18)  SpO2: 97% (02-18-19 @ 20:39) (93% - 97%)  Wt(kg): --        02-17-19 @ 07:01  -  02-18-19 @ 07:00  --------------------------------------------------------  IN: 600 mL / OUT: 0 mL / NET: 600 mL    02-18-19 @ 07:01  -  02-18-19 @ 21:06  --------------------------------------------------------  IN: 420 mL / OUT: 0 mL / NET: 420 mL      Physical Exam:  	  Gen: Non toxic ill appearing muscle wasting   	no jvd ,  	Pulm: decrease bs  no rales or ronchi or wheezing  	CV: RRR, S1/S2; no rub  	Abd: +BS, soft, nontender/nondistended  	: No suprapubic tenderness  	UE: Warm, no cyanosis  no clubbing,  no edema  	LE: Warm, no cyanosis  no clubbing, 1 + pitting edema	    LABS/STUDIES  --------------------------------------------------------------------------------              9.6    4.61  >-----------<  226      [02-18-19 @ 09:25]              30.8     133  |  93  |  18  ----------------------------<  140      [02-18-19 @ 05:42]  3.9   |  22  |  4.56        Ca     7.3     [02-18-19 @ 05:42]      Mg     2.1     [02-18-19 @ 05:42]      Phos  3.6     [02-18-19 @ 05:42]            Creatinine Trend:  SCr 4.56 [02-18 @ 05:42]  SCr 4.74 [02-16 @ 07:35]  SCr 3.94 [02-15 @ 16:10]  SCr 3.74 [02-15 @ 11:04]  SCr 4.63 [02-14 @ 09:29]                  PTH -- (Ca 8.3)      [03-03-18 @ 08:53]   134  HbA1c 7.6      [02-13-19 @ 08:42]  TSH 0.71      [11-17-18 @ 08:25]  Lipid: chol 113, TG 86, HDL 59, LDL 37      [04-30-18 @ 06:44]

## 2019-02-18 NOTE — PROVIDER CONTACT NOTE (CRITICAL VALUE NOTIFICATION) - ACTION/TREATMENT ORDERED:
NP Cino aware and at bedside. Patient given a amp of dextrose and cookies at bedside that patient is eating. Will recheck FS in 15 minutes per protocol. Will continue to monitor.
Husam Gipson NP aware and acknowledged. No interventions at this time. Will continue to monitor.
pt lactate downtrending. continue current treatments

## 2019-02-18 NOTE — PROGRESS NOTE ADULT - SUBJECTIVE AND OBJECTIVE BOX
PULMONARY PROGRESS NOTE    NATHAN MEEKS  MRN-[id number]    Patient is a 63y old  Female who presents with a chief complaint of DKA/Hyperkalemia/Pneumonia (12 Feb 2019 12:32)      HPI:  feels tired     ROS:   -no N/V    MEDICATIONS  (STANDING):  aspirin enteric coated 81 milliGRAM(s) Oral daily  atorvastatin 20 milliGRAM(s) Oral at bedtime  azithromycin  IVPB 500 milliGRAM(s) IV Intermittent every 24 hours  cinacalcet 60 milliGRAM(s) Oral daily  dextrose 5%. 1000 milliLiter(s) (50 mL/Hr) IV Continuous <Continuous>  dextrose 50% Injectable 12.5 Gram(s) IV Push once  dextrose 50% Injectable 25 Gram(s) IV Push once  dextrose 50% Injectable 25 Gram(s) IV Push once  DULoxetine 60 milliGRAM(s) Oral daily  heparin  Injectable 5000 Unit(s) SubCutaneous every 12 hours  hydrALAZINE 100 milliGRAM(s) Oral every 8 hours  insulin detemir injectable (LEVEMIR) 5 Unit(s) SubCutaneous at bedtime  insulin lispro (HumaLOG) corrective regimen sliding scale   SubCutaneous three times a day before meals  insulin lispro (HumaLOG) corrective regimen sliding scale   SubCutaneous at bedtime  insulin lispro Injectable (HumaLOG) 3 Unit(s) SubCutaneous before dinner  insulin lispro Injectable (HumaLOG) 2 Unit(s) SubCutaneous before breakfast  insulin lispro Injectable (HumaLOG) 2 Unit(s) SubCutaneous before lunch  metoprolol succinate ER 50 milliGRAM(s) Oral daily  piperacillin/tazobactam IVPB. 3.375 Gram(s) IV Intermittent every 12 hours  sevelamer hydrochloride 800 milliGRAM(s) Oral three times a day with meals    MEDICATIONS  (PRN):  dextrose 40% Gel 15 Gram(s) Oral once PRN Blood Glucose LESS THAN 70 milliGRAM(s)/deciliter  glucagon  Injectable 1 milliGRAM(s) IntraMuscular once PRN Glucose LESS THAN 70 milligrams/deciliter  HYDROmorphone  Injectable 0.5 milliGRAM(s) IV Push every 8 hours PRN Moderate Pain (4 - 6)  nitroglycerin     SubLingual 0.4 milliGRAM(s) SubLingual every 5 minutes PRN Chest Pain        EXAM:  Vital Signs Last 24 Hrs  T(C): 36.6 (18 Feb 2019 05:08), Max: 37.1 (17 Feb 2019 20:33)  T(F): 97.9 (18 Feb 2019 05:08), Max: 98.7 (17 Feb 2019 20:33)  HR: 76 (18 Feb 2019 05:08) (72 - 82)  BP: 144/69 (18 Feb 2019 05:08) (110/68 - 160/86)  BP(mean): 93 (17 Feb 2019 11:50) (93 - 93)  RR: 18 (18 Feb 2019 05:08) (18 - 19)  SpO2: 96% (18 Feb 2019 05:08) (90% - 97%)    GENERAL: The patient is awake and alert in no apparent distress.   LUNGS: clear, no wheeze    LABS/IMAGING: reviewed                                   9.6    4.61  )-----------( 226      ( 18 Feb 2019 09:25 )             30.8   02-18    133<L>  |  93<L>  |  18  ----------------------------<  140<H>  3.9   |  22  |  4.56<H>    Ca    7.3<L>      18 Feb 2019 05:42  Phos  3.6     02-18  Mg     2.1     02-18      < from: Xray Chest 1 View- PORTABLE-Urgent (02.17.19 @ 10:24) >  IMPRESSION:   Haziness at medial left lung base - due to atelectasis or pneumonia.  Interval development of right pleural effusion; increased reticulation in   the right lung.    < end of copied text >        < from: CT Angio Chest w/ IV Cont (02.11.19 @ 20:37) >  IMPRESSION:   No pulmonary embolism.    Debris/secretions within the trachea and left main bronchus. Scattered   patchy/groundglassleft lower lobe opacities may be infectious in   etiology. This constellation of findings can be seen in the setting of   aspiration.    Unilateral interlobular septal thickening on the right as seen on the   prior CT of the chest dated 1/23/2019. Lymphangitic spread of malignancy   cannot be excluded.    Right paratracheal, subcarinal and hilar lymphadenopathy, overall not   significant change from 1/23/2019.    Consider PET/CT for further evaluation.    < end of copied text >      PROBLEM LIST:  63y Female with HEALTH ISSUES - PROBLEM Dx:  Type 1 diabetes mellitus with diabetic peripheral angiopathy without gangrene: Type 1 diabetes mellitus with diabetic peripheral angiopathy without gangrene  ESRD (end stage renal disease): ESRD (end stage renal disease)  Hyperkalemia: Hyperkalemia  Pneumonia of left lower lobe due to infectious organism: Pneumonia of left lower lobe due to infectious organism  Diabetic ketoacidosis without coma associated with type 1 diabetes mellitus: Diabetic ketoacidosis without coma associated with type 1 diabetes mellitus    RECS:  -HCAP coverage, would dc abx now  -swallow eval, aspiration precaution  -for thoracic surgery in 2 weeks for abnormal ct  -hd per renal    Alem Tate MD   [phone]

## 2019-02-19 LAB
ANION GAP SERPL CALC-SCNC: 19 MMOL/L — HIGH (ref 5–17)
BUN SERPL-MCNC: 26 MG/DL — HIGH (ref 7–23)
CALCIUM SERPL-MCNC: 7.1 MG/DL — LOW (ref 8.4–10.5)
CHLORIDE SERPL-SCNC: 95 MMOL/L — LOW (ref 96–108)
CO2 SERPL-SCNC: 21 MMOL/L — LOW (ref 22–31)
CREAT SERPL-MCNC: 6.27 MG/DL — HIGH (ref 0.5–1.3)
GLUCOSE BLDC GLUCOMTR-MCNC: 101 MG/DL — HIGH (ref 70–99)
GLUCOSE BLDC GLUCOMTR-MCNC: 118 MG/DL — HIGH (ref 70–99)
GLUCOSE BLDC GLUCOMTR-MCNC: 119 MG/DL — HIGH (ref 70–99)
GLUCOSE BLDC GLUCOMTR-MCNC: 125 MG/DL — HIGH (ref 70–99)
GLUCOSE BLDC GLUCOMTR-MCNC: 55 MG/DL — LOW (ref 70–99)
GLUCOSE BLDC GLUCOMTR-MCNC: 55 MG/DL — LOW (ref 70–99)
GLUCOSE BLDC GLUCOMTR-MCNC: 56 MG/DL — LOW (ref 70–99)
GLUCOSE BLDC GLUCOMTR-MCNC: 59 MG/DL — LOW (ref 70–99)
GLUCOSE SERPL-MCNC: 65 MG/DL — LOW (ref 70–99)
POTASSIUM SERPL-MCNC: 4.2 MMOL/L — SIGNIFICANT CHANGE UP (ref 3.5–5.3)
POTASSIUM SERPL-SCNC: 4.2 MMOL/L — SIGNIFICANT CHANGE UP (ref 3.5–5.3)
SODIUM SERPL-SCNC: 135 MMOL/L — SIGNIFICANT CHANGE UP (ref 135–145)

## 2019-02-19 PROCEDURE — 99232 SBSQ HOSP IP/OBS MODERATE 35: CPT

## 2019-02-19 RX ORDER — DEXTROSE 50 % IN WATER 50 %
25 SYRINGE (ML) INTRAVENOUS ONCE
Qty: 0 | Refills: 0 | Status: DISCONTINUED | OUTPATIENT
Start: 2019-02-19 | End: 2019-02-21

## 2019-02-19 RX ORDER — INSULIN DETEMIR 100/ML (3)
4 INSULIN PEN (ML) SUBCUTANEOUS AT BEDTIME
Qty: 0 | Refills: 0 | Status: DISCONTINUED | OUTPATIENT
Start: 2019-02-19 | End: 2019-02-20

## 2019-02-19 RX ADMIN — HYDROMORPHONE HYDROCHLORIDE 0.5 MILLIGRAM(S): 2 INJECTION INTRAMUSCULAR; INTRAVENOUS; SUBCUTANEOUS at 18:25

## 2019-02-19 RX ADMIN — HYDROMORPHONE HYDROCHLORIDE 0.5 MILLIGRAM(S): 2 INJECTION INTRAMUSCULAR; INTRAVENOUS; SUBCUTANEOUS at 18:08

## 2019-02-19 RX ADMIN — Medication 100 MILLIGRAM(S): at 22:33

## 2019-02-19 RX ADMIN — CINACALCET 60 MILLIGRAM(S): 30 TABLET, FILM COATED ORAL at 10:57

## 2019-02-19 RX ADMIN — DULOXETINE HYDROCHLORIDE 60 MILLIGRAM(S): 30 CAPSULE, DELAYED RELEASE ORAL at 10:58

## 2019-02-19 RX ADMIN — Medication 50 MILLIGRAM(S): at 07:13

## 2019-02-19 RX ADMIN — Medication 100 MILLIGRAM(S): at 07:10

## 2019-02-19 RX ADMIN — SEVELAMER CARBONATE 800 MILLIGRAM(S): 2400 POWDER, FOR SUSPENSION ORAL at 07:54

## 2019-02-19 RX ADMIN — HYDROMORPHONE HYDROCHLORIDE 0.5 MILLIGRAM(S): 2 INJECTION INTRAMUSCULAR; INTRAVENOUS; SUBCUTANEOUS at 00:35

## 2019-02-19 RX ADMIN — ATORVASTATIN CALCIUM 20 MILLIGRAM(S): 80 TABLET, FILM COATED ORAL at 22:33

## 2019-02-19 RX ADMIN — Medication 4 UNIT(S): at 22:33

## 2019-02-19 RX ADMIN — HYDROMORPHONE HYDROCHLORIDE 0.5 MILLIGRAM(S): 2 INJECTION INTRAMUSCULAR; INTRAVENOUS; SUBCUTANEOUS at 01:05

## 2019-02-19 RX ADMIN — Medication 81 MILLIGRAM(S): at 10:58

## 2019-02-19 RX ADMIN — Medication 2 UNIT(S): at 07:54

## 2019-02-19 NOTE — PROGRESS NOTE ADULT - SUBJECTIVE AND OBJECTIVE BOX
Cardiovascular Disease Progress Note    Overnight events: No acute events overnight.  plan for [facility] today. denies any cp/sob/pnd/orthopnea/palps  Otherwise review of systems negative    Objective Findings:  T(C): 36.6 (02-19-19 @ 04:05), Max: 36.7 (02-18-19 @ 20:39)  HR: 69 (02-19-19 @ 04:05) (69 - 75)  BP: 158/77 (02-19-19 @ 04:05) (135/79 - 162/80)  RR: 18 (02-19-19 @ 04:05) (18 - 18)  SpO2: 96% (02-19-19 @ 04:05) (96% - 97%)  Wt(kg): --  Daily     Daily       Physical Exam:  Gen: NAD  HEENT: EOMI  CV: RRR, normal S1 + S2, no m/r/g  Lungs: CTAB  Abd: soft, non-tender  Ext: No edema    Telemetry:    Laboratory Data:                        9.6    4.61  )-----------( 226      ( 18 Feb 2019 09:25 )             30.8     02-18    133<L>  |  93<L>  |  18  ----------------------------<  140<H>  3.9   |  22  |  4.56<H>    Ca    7.3<L>      18 Feb 2019 05:42  Phos  3.6     02-18  Mg     2.1     02-18                Inpatient Medications:  MEDICATIONS  (STANDING):  aspirin enteric coated 81 milliGRAM(s) Oral daily  atorvastatin 20 milliGRAM(s) Oral at bedtime  cinacalcet 60 milliGRAM(s) Oral daily  dextrose 5%. 1000 milliLiter(s) (50 mL/Hr) IV Continuous <Continuous>  dextrose 50% Injectable 12.5 Gram(s) IV Push once  dextrose 50% Injectable 25 Gram(s) IV Push once  dextrose 50% Injectable 25 Gram(s) IV Push once  DULoxetine 60 milliGRAM(s) Oral daily  heparin  Injectable 5000 Unit(s) SubCutaneous every 12 hours  hydrALAZINE 100 milliGRAM(s) Oral every 8 hours  insulin detemir injectable (LEVEMIR) 5 Unit(s) SubCutaneous at bedtime  insulin lispro (HumaLOG) corrective regimen sliding scale   SubCutaneous three times a day before meals  insulin lispro (HumaLOG) corrective regimen sliding scale   SubCutaneous at bedtime  insulin lispro Injectable (HumaLOG) 3 Unit(s) SubCutaneous before dinner  insulin lispro Injectable (HumaLOG) 2 Unit(s) SubCutaneous before breakfast  insulin lispro Injectable (HumaLOG) 2 Unit(s) SubCutaneous before lunch  metoprolol succinate ER 50 milliGRAM(s) Oral daily  sevelamer hydrochloride 800 milliGRAM(s) Oral three times a day with meals      Assessment:  - DKA  -lactic acidosis  -elevated cardiac enzymes, chest pain, non specific ekg changes  -CHF, volume overload. acute on chronic systolic HF  -possible PNA  -ischemic cardiomyopathy, cad s/p multiple stents  -esrd on hd  -PAD s/p prior intervention   -remote TIA      Recs:  -appreciate renal recs. s/p HD. currently appears euvolemic  -DKA resolved. f/u endo recs  -new onset atypical chest pain. ekg with nonspecific ST changes. elevated hs-trop with negative delta. plan for C with Dr Vela today  -hx of prolonged qtc. avoid qtc prolonging meds  -CT chest findings concerning for malignancy and possible left lower lobe pna. follows with dr drake and dr michael. scheduled for surgery at [facility] in 1 week. s/p abx for pna  -c/w asa, plavix, statin for ischemic cardiomyopathy/CAD/PAD if not contraindicated  -c/w toprol and hydralazine for ischemic cardiomyopathy and HTN   -dvt ppx      Over 25 minutes spent on total encounter; more than 50% of the visit was spent counseling and/or coordinating care by the attending physician.      Julián Biswas MD   Cardiovascular Disease  [phone]

## 2019-02-19 NOTE — PROGRESS NOTE ADULT - SUBJECTIVE AND OBJECTIVE BOX
Diabetes Follow up note:  Interval Hx: 64 yo woman with uncontrolled type 1 diabetes (well known from prior admissions) w/ESRD on HD, neuropathy, CAD, CVA, CHF w/ very insulin sensitive.  Non-adherent to diet at home with micro and macrovascular complications admitted for shortness of breath and pneumonia on IV abx.  S/p cardiac cath today with noted restenosis of prior interventions. Medically treated. Pt noted to have hypoglycemia after procedure with BG in 50s without any s&sx. Tolerating POs.     Review of Systems:  General: Denies pain  GI: Tolerating POs without any N/V/D/ABD PAIN.  CV: No CP/SOB  ENDO: No S&Sx of hypoglycemia + hypoglycemia unawareness.     MEDICATIONS  (STANDING):  atorvastatin 20 milliGRAM(s) Oral at bedtime  cinacalcet 60 milliGRAM(s) Oral daily  insulin detemir injectable (LEVEMIR) 5 Unit(s) SubCutaneous at bedtime  insulin lispro (HumaLOG) corrective regimen sliding scale   SubCutaneous three times a day before meals  insulin lispro (HumaLOG) corrective regimen sliding scale   SubCutaneous at bedtime  insulin lispro Injectable (HumaLOG) 3 Unit(s) SubCutaneous before dinner  insulin lispro Injectable (HumaLOG) 2 Unit(s) SubCutaneous before breakfast  insulin lispro Injectable (HumaLOG) 2 Unit(s) SubCutaneous before lunch      Allergies    No Known Allergies        PE:  General: Female sitting in bed. NAD.   Vital Signs Last 24 Hrs  T(C): 36.5 (02-19-19 @ 14:54), Max: 36.8 (02-19-19 @ 10:55)  T(F): 97.7 (02-19-19 @ 14:54), Max: 98.2 (02-19-19 @ 10:55)  HR: 74 (02-19-19 @ 17:24) (69 - 75)  BP: 138/85 (02-19-19 @ 17:24) (126/84 - 162/80)  BP(mean): --  RR: 18 (02-19-19 @ 16:24) (18 - 18)  SpO2: 97% (02-19-19 @ 16:24) (94% - 98%)  Abd: Soft, NT,ND,   Extremities: Warm. no edema. L AV fistula in place.   Neuro: A&O X3    LABS:  POCT Blood Glucose.: 125 mg/dL (02-19-19 @ 16:16)  POCT Blood Glucose.: 119 mg/dL (02-19-19 @ 13:07)  POCT Blood Glucose.: 59 mg/dL (02-19-19 @ 12:08)  POCT Blood Glucose.: 56 mg/dL (02-19-19 @ 12:07)  POCT Blood Glucose.: 55 mg/dL (02-19-19 @ 11:47)  POCT Blood Glucose.: 55 mg/dL (02-19-19 @ 11:46)  POCT Blood Glucose.: 101 mg/dL (02-19-19 @ 07:46)  POCT Blood Glucose.: 191 mg/dL (02-18-19 @ 22:23)  POCT Blood Glucose.: 171 mg/dL (02-18-19 @ 21:11)  POCT Blood Glucose.: 100 mg/dL (02-18-19 @ 16:47)  POCT Blood Glucose.: 105 mg/dL (02-18-19 @ 11:55)  POCT Blood Glucose.: 109 mg/dL (02-18-19 @ 07:59)  POCT Blood Glucose.: 167 mg/dL (02-17-19 @ 21:04)                          9.6    4.61  )-----------( 226      ( 18 Feb 2019 09:25 )             30.8     02-19    135  |  95<L>  |  26<H>  ----------------------------<  65<L>  4.2   |  21<L>  |  6.27<H>    Ca    7.1<L>      19 Feb 2019 10:43  Phos  3.6     02-18  Mg     2.1     02-18      Hemoglobin A1C, Whole Blood: 7.6 % <H> [4.0 - 5.6] (02-13-19 @ 08:42) Diabetes Follow up note:  Interval Hx: 62 yo woman with uncontrolled type 1 diabetes (well known from prior admissions) w/ESRD on HD, neuropathy, CAD, CVA, CHF w/ very insulin sensitive.  Non-adherent to diet at home with micro and macrovascular complications admitted for shortness of breath and pneumonia on IV abx.  S/p cardiac cath today with noted restenosis of prior interventions. Medically treated. Pt noted to have hypoglycemia after procedure with BG in 50s without any s&sx. Tolerating POs.     Review of Systems:  General: Denies pain  GI: Tolerating POs without any N/V/D/ABD PAIN.  CV: No CP/SOB  ENDO: No S&Sx of hypoglycemia + hypoglycemia unawareness.     MEDICATIONS  (STANDING):  atorvastatin 20 milliGRAM(s) Oral at bedtime  cinacalcet 60 milliGRAM(s) Oral daily  insulin detemir injectable (LEVEMIR) 5 Unit(s) SubCutaneous at bedtime  insulin lispro (HumaLOG) corrective regimen sliding scale   SubCutaneous three times a day before meals  insulin lispro (HumaLOG) corrective regimen sliding scale   SubCutaneous at bedtime  insulin lispro Injectable (HumaLOG) 3 Unit(s) SubCutaneous before dinner  insulin lispro Injectable (HumaLOG) 2 Unit(s) SubCutaneous before breakfast  insulin lispro Injectable (HumaLOG) 2 Unit(s) SubCutaneous before lunch      Allergies    No Known Allergies        PE:  General: Female laying in bed having HD in NAD.   Vital Signs Last 24 Hrs  T(C): 36.5 (02-19-19 @ 14:54), Max: 36.8 (02-19-19 @ 10:55)  T(F): 97.7 (02-19-19 @ 14:54), Max: 98.2 (02-19-19 @ 10:55)  HR: 74 (02-19-19 @ 17:24) (69 - 75)  BP: 138/85 (02-19-19 @ 17:24) (126/84 - 162/80)  BP(mean): --  RR: 18 (02-19-19 @ 16:24) (18 - 18)  SpO2: 97% (02-19-19 @ 16:24) (94% - 98%)  Abd: Soft, NT,ND, R inguinal dressing D&I  Extremities: Warm. L AV fistula in place. LLE with some trace edema around ankle.  Neuro: A&O X3    LABS:  POCT Blood Glucose.: 125 mg/dL (02-19-19 @ 16:16)  POCT Blood Glucose.: 119 mg/dL (02-19-19 @ 13:07)  POCT Blood Glucose.: 59 mg/dL (02-19-19 @ 12:08)  POCT Blood Glucose.: 56 mg/dL (02-19-19 @ 12:07)  POCT Blood Glucose.: 55 mg/dL (02-19-19 @ 11:47)  POCT Blood Glucose.: 55 mg/dL (02-19-19 @ 11:46)  POCT Blood Glucose.: 101 mg/dL (02-19-19 @ 07:46)  POCT Blood Glucose.: 191 mg/dL (02-18-19 @ 22:23)  POCT Blood Glucose.: 171 mg/dL (02-18-19 @ 21:11)  POCT Blood Glucose.: 100 mg/dL (02-18-19 @ 16:47)  POCT Blood Glucose.: 105 mg/dL (02-18-19 @ 11:55)  POCT Blood Glucose.: 109 mg/dL (02-18-19 @ 07:59)  POCT Blood Glucose.: 167 mg/dL (02-17-19 @ 21:04)                          9.6    4.61  )-----------( 226      ( 18 Feb 2019 09:25 )             30.8     02-19    135  |  95<L>  |  26<H>  ----------------------------<  65<L>  4.2   |  21<L>  |  6.27<H>    Ca    7.1<L>      19 Feb 2019 10:43  Phos  3.6     02-18  Mg     2.1     02-18      Hemoglobin A1C, Whole Blood: 7.6 % <H> [4.0 - 5.6] (02-13-19 @ 08:42)

## 2019-02-19 NOTE — PROGRESS NOTE ADULT - SUBJECTIVE AND OBJECTIVE BOX
Patient is a 63y old  Female who presents with a chief complaint of DKA/Hyperkalemia/Pneumonia (19 Feb 2019 08:06)      SUBJECTIVE / OVERNIGHT EVENTS: No new complaints.   Review of Systems  chest pain no  palpitations no  sob no  nausea no  headache no    MEDICATIONS  (STANDING):  aspirin enteric coated 81 milliGRAM(s) Oral daily  atorvastatin 20 milliGRAM(s) Oral at bedtime  cinacalcet 60 milliGRAM(s) Oral daily  dextrose 5%. 1000 milliLiter(s) (50 mL/Hr) IV Continuous <Continuous>  dextrose 50% Injectable 12.5 Gram(s) IV Push once  dextrose 50% Injectable 25 Gram(s) IV Push once  dextrose 50% Injectable 25 Gram(s) IV Push once  dextrose 50% Injectable 25 milliLiter(s) IV Push once  DULoxetine 60 milliGRAM(s) Oral daily  heparin  Injectable 5000 Unit(s) SubCutaneous every 12 hours  hydrALAZINE 100 milliGRAM(s) Oral every 8 hours  insulin detemir injectable (LEVEMIR) 5 Unit(s) SubCutaneous at bedtime  insulin lispro (HumaLOG) corrective regimen sliding scale   SubCutaneous three times a day before meals  insulin lispro (HumaLOG) corrective regimen sliding scale   SubCutaneous at bedtime  insulin lispro Injectable (HumaLOG) 3 Unit(s) SubCutaneous before dinner  insulin lispro Injectable (HumaLOG) 2 Unit(s) SubCutaneous before breakfast  insulin lispro Injectable (HumaLOG) 2 Unit(s) SubCutaneous before lunch  metoprolol succinate ER 50 milliGRAM(s) Oral daily  sevelamer hydrochloride 800 milliGRAM(s) Oral three times a day with meals    MEDICATIONS  (PRN):  dextrose 40% Gel 15 Gram(s) Oral once PRN Blood Glucose LESS THAN 70 milliGRAM(s)/deciliter  glucagon  Injectable 1 milliGRAM(s) IntraMuscular once PRN Glucose LESS THAN 70 milligrams/deciliter  HYDROmorphone  Injectable 0.5 milliGRAM(s) IV Push every 8 hours PRN Moderate Pain (4 - 6)  nitroglycerin     SubLingual 0.4 milliGRAM(s) SubLingual every 5 minutes PRN Chest Pain      Vital Signs Last 24 Hrs  T(C): 36.5 (19 Feb 2019 14:54), Max: 36.8 (19 Feb 2019 10:55)  T(F): 97.7 (19 Feb 2019 14:54), Max: 98.2 (19 Feb 2019 10:55)  HR: 74 (19 Feb 2019 16:24) (69 - 75)  BP: 128/80 (19 Feb 2019 16:24) (126/84 - 162/80)  BP(mean): --  RR: 18 (19 Feb 2019 16:24) (18 - 18)  SpO2: 97% (19 Feb 2019 16:24) (94% - 98%)    PHYSICAL EXAM:  GENERAL: NAD  HEAD:  Atraumatic, Normocephalic  EYES: EOMI, PERRLA, conjunctiva and sclera clear  NECK: Supple, No JVD  CHEST/LUNG: Clear to auscultation bilaterally; No wheeze  HEART: Regular rate and rhythm; No murmurs, rubs, or gallops  ABDOMEN: Soft, Nontender, Nondistended; Bowel sounds present  EXTREMITIES:  2+ Peripheral Pulses, No clubbing, cyanosis, or edema  PSYCH: AAOx3  NEUROLOGY: non-focal  SKIN: No rashes or lesions    LABS:                        9.6    4.61  )-----------( 226      ( 18 Feb 2019 09:25 )             30.8     02-19    135  |  95<L>  |  26<H>  ----------------------------<  65<L>  4.2   |  21<L>  |  6.27<H>    Ca    7.1<L>      19 Feb 2019 10:43  Phos  3.6     02-18  Mg     2.1     02-18                  RADIOLOGY & ADDITIONAL TESTS:    Imaging Personally Reviewed:    Consultant(s) Notes Reviewed:      Care Discussed with Consultants/Other Providers:

## 2019-02-19 NOTE — PROGRESS NOTE ADULT - SUBJECTIVE AND OBJECTIVE BOX
[facility]   [phone]  DIALYSIS NOTE  Chief Complaint: ESRD/Ongoing hemodialysis requirement.     24 hour events/subjective:    seen earlier.   s/p coronary angio   still sedated         ALLERGIES & MEDICATIONS  --------------------------------------------------------------------------------  Allergies    No Known Allergies    Intolerances      Standing Inpatient Medications  aspirin enteric coated 81 milliGRAM(s) Oral daily  atorvastatin 20 milliGRAM(s) Oral at bedtime  cinacalcet 60 milliGRAM(s) Oral daily  dextrose 5%. 1000 milliLiter(s) IV Continuous <Continuous>  dextrose 50% Injectable 12.5 Gram(s) IV Push once  dextrose 50% Injectable 25 Gram(s) IV Push once  dextrose 50% Injectable 25 Gram(s) IV Push once  dextrose 50% Injectable 25 milliLiter(s) IV Push once  DULoxetine 60 milliGRAM(s) Oral daily  heparin  Injectable 5000 Unit(s) SubCutaneous every 12 hours  hydrALAZINE 100 milliGRAM(s) Oral every 8 hours  insulin detemir injectable (LEVEMIR) 4 Unit(s) SubCutaneous at bedtime  insulin lispro (HumaLOG) corrective regimen sliding scale   SubCutaneous three times a day before meals  insulin lispro (HumaLOG) corrective regimen sliding scale   SubCutaneous at bedtime  insulin lispro Injectable (HumaLOG) 3 Unit(s) SubCutaneous before dinner  insulin lispro Injectable (HumaLOG) 2 Unit(s) SubCutaneous before breakfast  insulin lispro Injectable (HumaLOG) 2 Unit(s) SubCutaneous before lunch  metoprolol succinate ER 50 milliGRAM(s) Oral daily  sevelamer hydrochloride 800 milliGRAM(s) Oral three times a day with meals    PRN Inpatient Medications  dextrose 40% Gel 15 Gram(s) Oral once PRN  glucagon  Injectable 1 milliGRAM(s) IntraMuscular once PRN  HYDROmorphone  Injectable 0.5 milliGRAM(s) IV Push every 8 hours PRN  nitroglycerin     SubLingual 0.4 milliGRAM(s) SubLingual every 5 minutes PRN      REVIEW OF SYSTEMS  --------------------------------------------------------------------------------  sedated when seen       VITALS/PHYSICAL EXAM  --------------------------------------------------------------------------------  T(C): 36.7 (02-19-19 @ 18:19), Max: 36.8 (02-19-19 @ 10:55)  HR: 71 (02-19-19 @ 18:19) (69 - 74)  BP: 150/76 (02-19-19 @ 18:19) (126/84 - 158/77)  RR: 18 (02-19-19 @ 18:19) (18 - 18)  SpO2: 97% (02-19-19 @ 18:19) (94% - 98%)  Wt(kg): --        02-18-19 @ 07:01  -  02-19-19 @ 07:00  --------------------------------------------------------  IN: 900 mL / OUT: 0 mL / NET: 900 mL    02-19-19 @ 07:01  -  02-19-19 @ 21:44  --------------------------------------------------------  IN: 220 mL / OUT: 0 mL / NET: 220 mL      Physical Exam:  en: Non toxic ill appearing muscle wasting   	no jvd ,  	Pulm: decrease bs  no rales or ronchi or wheezing  	CV: RRR, S1/S2; no rub  	Abd: +BS, soft, nontender/nondistended  	: No suprapubic tenderness  	UE: Warm, no cyanosis  no clubbing,  no edema  	LE: Warm, no cyanosis  no clubbing, 1 + pitting zo		    	    LABS/STUDIES  --------------------------------------------------------------------------------              9.6    4.61  >-----------<  226      [02-18-19 @ 09:25]              30.8     135  |  95  |  26  ----------------------------<  65      [02-19-19 @ 10:43]  4.2   |  21  |  6.27        Ca     7.1     [02-19-19 @ 10:43]      Mg     2.1     [02-18-19 @ 05:42]      Phos  3.6     [02-18-19 @ 05:42]                    imp/suggest: ESRD      Hemodialysis Prescription:  	Access:  	Dialyzer: revaclear   	Blood Flow (mL/Min): 400  	Dialysate Flow (mL/Min): 600  	Target UF (Liters):  	Treatment Time:  	Potassium:   	Calcium: 2.5  	  YOLANDA    Vitamin D     continue with hd   see hd flow sheet

## 2019-02-19 NOTE — CHART NOTE - NSCHARTNOTEFT_GEN_A_CORE
Called by primary cardiologist to evaluate patient.    Patient asymptomatic, sleeping comfortably. EKG without significant ST changes. Trop elevation in setting of CKD/acute illness. Please order another set of enzymes now. Further reccs per primary cardiologist.      Jeremias Hutton MD  Cardiology Fellow
Chief Complaint:      Informed by RN that pt's FS is 535, she is type 1 DM. Labs are remarkable for hyperglycemia, pH 7.33, + anion gap of 23, BNP >37928, lactate of 4.2 being admitted for PNA and hyperkalemia of 6.7 (was given d50 and insulin 10 units at 21:00 on 2/11  with f/u K at 4.6) now p/w elevated blood glucose  concerning for DKA. Pt is awake mentating well, NAD.     PMH/PSH:  PAST MEDICAL & SURGICAL HISTORY:  CHF (congestive heart failure): EF 40-45%  Subclavian vein stenosis, left: s/p stent  DKA, type 1: 1/2015  ACS (acute coronary syndrome): 1/2015 - cath revealed 100% ostial stenosis not amenable to PCI - medical management  TIA (transient ischemic attack): x 2 - 8-9 years ago prior to ASD/VSD repair  CAD (coronary artery disease): s/p stents  Gout: past  CVA (cerebral infarction): with no residual, 8 yrs ago, prior to heart surgery - ST memory loss  Peripheral vascular disease: occluded left fem-pop bypass 5/2015  Diabetes mellitus type 1: Insulin Dependent - [employer]  Minimed Paradigm Insulin Pump - Novolog  ESRD (end stage renal disease): dialysis  M, tue, thursday, saturday  Hyperlipidemia  Status post device closure of ASD: &quot;clamshell&quot;  History of cardiac catheterization: 1/2015 - no intervention  S/P femoral-popliteal bypass surgery: L and R in 2013 with graft; 5/2015 CFA angioplasty left and ileofemoral endarterectomywith vein patch angioplasty of left fem-pop bypass graft  Multiple vascular surgery both leg, left fempop bypass revision 11/2015  AV (arteriovenous fistula): Left AV graft; revision with stent placement 2-3 years ago  S/P cholecystectomy        Allergies    No Known Allergies    Intolerances          Physical Exam:    Vital Signs Last 24 Hrs  T(C): 36.8 (12 Feb 2019 00:34), Max: 36.9 (11 Feb 2019 22:10)  T(F): 98.2 (12 Feb 2019 00:34), Max: 98.4 (11 Feb 2019 22:10)  HR: 98 (12 Feb 2019 00:34) (81 - 98)  BP: 129/69 (12 Feb 2019 00:34) (126/57 - 152/83)  BP(mean): --  RR: 18 (12 Feb 2019 00:34) (16 - 18)  SpO2: 100% (12 Feb 2019 00:34) (92% - 100%)    General:  NAD, AOx3, nontoxic appearing, skin appears dry   Head:  NC/AT, no scleral injection, no JVD   CV: RRR, S1S2   Respiratory: CTA B/L, nonlabored  Abdominal: Soft, NT, ND no palpable mass, no guarding or rebound tenderness  MSK: No BLLE edema, + peripheral pulses, FROM all 4 extremity      Labs:                          10.6   9.8   )-----------( 268      ( 11 Feb 2019 19:58 )             32.3     02-11    139  |  90<L>  |  33<H>  ----------------------------<  366<H>  4.6   |  26  |  4.67<H>    Ca    8.6      11 Feb 2019 23:21    TPro  7.0  /  Alb  4.5  /  TBili  0.4  /  DBili  x   /  AST  44<H>  /  ALT  43  /  AlkPhos  88  02-11          Radiology:    CXR   INTERPRETATION:  Bilateral interstitial markings are increased from prior   radiograph dated 1/19/2019 and likely represent lymphangitic spread of   malignancy is seen on recent CT chest. Right hilar lymphadenopathy is   noted, also better evaluated on same-day CT chest.          Assessment & Plan:    64 yo F with ESRD (on HD M/Tu/Th/Sa), type 1 DM, CAD, HFrEF (EF 50% on TTE from 10/18), TIA, and PVD, recently identified lung tumor (susp malignancy, on pulmo f/u), with PSH of cholecystectomy who presented today for SOB, sudden onset at home after she came back from HD admitted for hyperkalemia and PNA - awaiting H&P p/w hyperglycemia in the 500s with anion gap of 23 pH 7.33 concerning for Mild DKA. She did not receive her lantus last night and although she received her HD - her BNP > 70k and not ideal for additional IVF. HIC aware and pt is prioritized to be seen accordingly.     - 6 units  humalog x 1 dose   - Lantus 9 units x 1 dose   -f/u FS within 1 hr and then closely monitor overnight   Dced IVF NS, pt received about 50 cc of NS   - Repeat labs at 3:30 am   - Low threshold for formal MICU consult if pt deteriorates or if anion gap worsens etc.     Follow up with Primary Team in AM.    Zaida Matos NP [id number]
Notified by RN of LE, swelling, patient seen and assessed at bedside, no erythema/redness, with nonpitting edema, pulses 2+ B/L, no numbness or tingling, ordered for LE dopplers to evaluate for possibility of DVT. Will inform day provider, will continue to monitor, attending to follow.     Michael Williamson PA-C   Department of Medicine
[facility] Medicine Event Note    Notified by RN for substernal 8/10 CP, constant, radiating to her upper back.  Pt experienced this pain yesterday, which was relieved by nitro.   Denies N/V, dizziness, SOB, abdominal pain.    ICU Vital Signs Last 24 Hrs  T(C): 36.8 (15 Feb 2019 20:39), Max: 36.8 (15 Feb 2019 20:39)  T(F): 98.2 (15 Feb 2019 20:39), Max: 98.2 (15 Feb 2019 20:39)  HR: 75 (15 Feb 2019 20:39) (74 - 80)  BP: 131/65 (15 Feb 2019 20:39) (122/75 - 162/78)  BP(mean): --  ABP: --  ABP(mean): --  RR: 18 (15 Feb 2019 20:39) (18 - 18)  SpO2: 98% (15 Feb 2019 20:39) (96% - 98%)    63y old  Female who presents with a chief complaint of DKA/Hyperkalemia/Pneumonia, on IV abx, now with c/o CP.  EKG with no changes, HsT 272 in setting of ESRD.      Ordered EKG, cardiac enzymes  Ordered SL nitro  Patient already on NC    Will endorse to next shift    April Durbin PA-C  Dept of Medicine
NP note -  hypoglycemia    found pt had been hypoglycemia during lunch hour, asymptomatic. d/w Endo, decreased Levemir 5unit QHS to 4units. will monitor f/s and f/u hypoglycemia protocol.    NP. [place] Pardo  [id number]

## 2019-02-20 ENCOUNTER — APPOINTMENT (OUTPATIENT)
Dept: THORACIC SURGERY | Facility: CLINIC | Age: 64
End: 2019-02-20

## 2019-02-20 LAB
ANION GAP SERPL CALC-SCNC: 17 MMOL/L — SIGNIFICANT CHANGE UP (ref 5–17)
BUN SERPL-MCNC: 11 MG/DL — SIGNIFICANT CHANGE UP (ref 7–23)
C DIFF GDH STL QL: NEGATIVE — SIGNIFICANT CHANGE UP
C DIFF GDH STL QL: SIGNIFICANT CHANGE UP
CALCIUM SERPL-MCNC: 7.9 MG/DL — LOW (ref 8.4–10.5)
CHLORIDE SERPL-SCNC: 95 MMOL/L — LOW (ref 96–108)
CO2 SERPL-SCNC: 25 MMOL/L — SIGNIFICANT CHANGE UP (ref 22–31)
CREAT SERPL-MCNC: 3.73 MG/DL — HIGH (ref 0.5–1.3)
GLUCOSE BLDC GLUCOMTR-MCNC: 124 MG/DL — HIGH (ref 70–99)
GLUCOSE BLDC GLUCOMTR-MCNC: 135 MG/DL — HIGH (ref 70–99)
GLUCOSE BLDC GLUCOMTR-MCNC: 183 MG/DL — HIGH (ref 70–99)
GLUCOSE BLDC GLUCOMTR-MCNC: 74 MG/DL — SIGNIFICANT CHANGE UP (ref 70–99)
GLUCOSE BLDC GLUCOMTR-MCNC: 75 MG/DL — SIGNIFICANT CHANGE UP (ref 70–99)
GLUCOSE BLDC GLUCOMTR-MCNC: 98 MG/DL — SIGNIFICANT CHANGE UP (ref 70–99)
GLUCOSE SERPL-MCNC: 106 MG/DL — HIGH (ref 70–99)
HCT VFR BLD CALC: 33.9 % — LOW (ref 34.5–45)
HGB BLD-MCNC: 10.5 G/DL — LOW (ref 11.5–15.5)
MCHC RBC-ENTMCNC: 31 GM/DL — LOW (ref 32–36)
MCHC RBC-ENTMCNC: 32.7 PG — SIGNIFICANT CHANGE UP (ref 27–34)
MCV RBC AUTO: 105.6 FL — HIGH (ref 80–100)
PLATELET # BLD AUTO: 241 K/UL — SIGNIFICANT CHANGE UP (ref 150–400)
POTASSIUM SERPL-MCNC: 3.9 MMOL/L — SIGNIFICANT CHANGE UP (ref 3.5–5.3)
POTASSIUM SERPL-SCNC: 3.9 MMOL/L — SIGNIFICANT CHANGE UP (ref 3.5–5.3)
RBC # BLD: 3.21 M/UL — LOW (ref 3.8–5.2)
RBC # FLD: 14.7 % — HIGH (ref 10.3–14.5)
SODIUM SERPL-SCNC: 137 MMOL/L — SIGNIFICANT CHANGE UP (ref 135–145)
WBC # BLD: 3.53 K/UL — LOW (ref 3.8–10.5)
WBC # FLD AUTO: 3.53 K/UL — LOW (ref 3.8–10.5)

## 2019-02-20 PROCEDURE — 99233 SBSQ HOSP IP/OBS HIGH 50: CPT

## 2019-02-20 PROCEDURE — 99232 SBSQ HOSP IP/OBS MODERATE 35: CPT

## 2019-02-20 RX ORDER — INSULIN DETEMIR 100/ML (3)
3 INSULIN PEN (ML) SUBCUTANEOUS AT BEDTIME
Qty: 0 | Refills: 0 | Status: DISCONTINUED | OUTPATIENT
Start: 2019-02-20 | End: 2019-02-21

## 2019-02-20 RX ORDER — INSULIN LISPRO 100/ML
2 VIAL (ML) SUBCUTANEOUS
Qty: 0 | Refills: 0 | Status: DISCONTINUED | OUTPATIENT
Start: 2019-02-20 | End: 2019-02-21

## 2019-02-20 RX ORDER — INSULIN LISPRO 100/ML
3 VIAL (ML) SUBCUTANEOUS
Qty: 0 | Refills: 0 | Status: DISCONTINUED | OUTPATIENT
Start: 2019-02-20 | End: 2019-02-21

## 2019-02-20 RX ORDER — OXYCODONE HYDROCHLORIDE 5 MG/1
5 TABLET ORAL EVERY 8 HOURS
Qty: 0 | Refills: 0 | Status: DISCONTINUED | OUTPATIENT
Start: 2019-02-20 | End: 2019-02-21

## 2019-02-20 RX ADMIN — Medication 2 UNIT(S): at 13:04

## 2019-02-20 RX ADMIN — OXYCODONE HYDROCHLORIDE 5 MILLIGRAM(S): 5 TABLET ORAL at 14:05

## 2019-02-20 RX ADMIN — SEVELAMER CARBONATE 800 MILLIGRAM(S): 2400 POWDER, FOR SUSPENSION ORAL at 16:45

## 2019-02-20 RX ADMIN — OXYCODONE HYDROCHLORIDE 5 MILLIGRAM(S): 5 TABLET ORAL at 15:32

## 2019-02-20 RX ADMIN — Medication 50 MILLIGRAM(S): at 06:44

## 2019-02-20 RX ADMIN — Medication 100 MILLIGRAM(S): at 21:59

## 2019-02-20 RX ADMIN — HYDROMORPHONE HYDROCHLORIDE 0.5 MILLIGRAM(S): 2 INJECTION INTRAMUSCULAR; INTRAVENOUS; SUBCUTANEOUS at 02:24

## 2019-02-20 RX ADMIN — Medication 3 UNIT(S): at 21:59

## 2019-02-20 RX ADMIN — Medication 3 UNIT(S): at 16:45

## 2019-02-20 RX ADMIN — HYDROMORPHONE HYDROCHLORIDE 0.5 MILLIGRAM(S): 2 INJECTION INTRAMUSCULAR; INTRAVENOUS; SUBCUTANEOUS at 01:54

## 2019-02-20 RX ADMIN — CINACALCET 60 MILLIGRAM(S): 30 TABLET, FILM COATED ORAL at 11:14

## 2019-02-20 RX ADMIN — Medication 2 UNIT(S): at 08:07

## 2019-02-20 RX ADMIN — SEVELAMER CARBONATE 800 MILLIGRAM(S): 2400 POWDER, FOR SUSPENSION ORAL at 11:14

## 2019-02-20 RX ADMIN — ATORVASTATIN CALCIUM 20 MILLIGRAM(S): 80 TABLET, FILM COATED ORAL at 21:59

## 2019-02-20 RX ADMIN — DULOXETINE HYDROCHLORIDE 60 MILLIGRAM(S): 30 CAPSULE, DELAYED RELEASE ORAL at 11:14

## 2019-02-20 RX ADMIN — Medication 81 MILLIGRAM(S): at 11:14

## 2019-02-20 RX ADMIN — Medication 100 MILLIGRAM(S): at 13:03

## 2019-02-20 RX ADMIN — Medication 100 MILLIGRAM(S): at 06:43

## 2019-02-20 RX ADMIN — SEVELAMER CARBONATE 800 MILLIGRAM(S): 2400 POWDER, FOR SUSPENSION ORAL at 08:07

## 2019-02-20 NOTE — DIETITIAN INITIAL EVALUATION ADULT. - ADHERENCE
Pt reports following a renal and DM appropriate diet PTA. On recent previous admit, pt with ability to teach back therapeutic diet recommendations./fair

## 2019-02-20 NOTE — DIETITIAN INITIAL EVALUATION ADULT. - ENERGY NEEDS
Height: 64 inches, Weight: 118 pounds (dosing)  BMI: 20.3 kg/m2 IBW: 120 pounds (+/-10%), %IBW: 98%  Pertinent Info: 1+ edema to L ankle noted, no pressure injuries noted at this time in nursing flow sheet.  Other pertinent info: Pt is 63yoF with extensive PMH significant for Type 1 DM, ESRD on HD, HFrEF (EF 50% on TTE from 10/18), CAD, TIA, PVD, ACS, PCS, CVA, HLD, presenting with SOB, DKA, hyperkalemia, PNA. Pt noted with recent admission 1/2019 for DKA with septic shock of unclear source. Also noted with possible lung malignancy on that admission, pending possible further work up currently. Course c/b chest pain, s/p LHC with diffuse and severe restenosis- medical management for now. Pt noted with hypoglycemia event 2/19, endocrinology following.

## 2019-02-20 NOTE — DIETITIAN INITIAL EVALUATION ADULT. - PHYSICAL APPEARANCE
BMI 20.3, pt appears well nourished with no obvious signs of muscle wasting or fat loss. Nutrition focused physical exam deferred given recent admission where exam was performed with no significant findings./other (specify)

## 2019-02-20 NOTE — PROGRESS NOTE ADULT - SUBJECTIVE AND OBJECTIVE BOX
PULMONARY PROGRESS NOTE    NATHAN MEEKS  MRN-[id number]    Patient is a 63y old  Female who presents with a chief complaint of DKA/Hyperkalemia/Pneumonia (12 Feb 2019 12:32)      HPI:  denies cp/sob/cough, wants to go home.  Had cath yesterday     DIAGNOSTIC IMPRESSIONS: Patient has severe restenosis of ostial and  proximal RCA with total occlusion of right PL with left to right  collaterals. The LAD has luminal diseae and the LCX is totally occluded  which is old finding. Patient has had at least 8 interventions on RCA  including brachytherapy with multiple re stenoses  DIAGNOSTIC RECOMMENDATIONS: Aggressive medical therapy    ROS:   -no N/V    MEDICATIONS  (STANDING):  aspirin enteric coated 81 milliGRAM(s) Oral daily  atorvastatin 20 milliGRAM(s) Oral at bedtime  cinacalcet 60 milliGRAM(s) Oral daily  dextrose 5%. 1000 milliLiter(s) (50 mL/Hr) IV Continuous <Continuous>  dextrose 50% Injectable 12.5 Gram(s) IV Push once  dextrose 50% Injectable 25 Gram(s) IV Push once  dextrose 50% Injectable 25 Gram(s) IV Push once  dextrose 50% Injectable 25 milliLiter(s) IV Push once  DULoxetine 60 milliGRAM(s) Oral daily  heparin  Injectable 5000 Unit(s) SubCutaneous every 12 hours  hydrALAZINE 100 milliGRAM(s) Oral every 8 hours  insulin detemir injectable (LEVEMIR) 3 Unit(s) SubCutaneous at bedtime  insulin lispro (HumaLOG) corrective regimen sliding scale   SubCutaneous three times a day before meals  insulin lispro (HumaLOG) corrective regimen sliding scale   SubCutaneous at bedtime  insulin lispro Injectable (HumaLOG) 3 Unit(s) SubCutaneous before dinner  insulin lispro Injectable (HumaLOG) 2 Unit(s) SubCutaneous before breakfast  insulin lispro Injectable (HumaLOG) 2 Unit(s) SubCutaneous before lunch  metoprolol succinate ER 50 milliGRAM(s) Oral daily  sevelamer hydrochloride 800 milliGRAM(s) Oral three times a day with meals    MEDICATIONS  (PRN):  dextrose 40% Gel 15 Gram(s) Oral once PRN Blood Glucose LESS THAN 70 milliGRAM(s)/deciliter  glucagon  Injectable 1 milliGRAM(s) IntraMuscular once PRN Glucose LESS THAN 70 milligrams/deciliter  HYDROmorphone  Injectable 0.5 milliGRAM(s) IV Push every 8 hours PRN Moderate Pain (4 - 6)  nitroglycerin     SubLingual 0.4 milliGRAM(s) SubLingual every 5 minutes PRN Chest Pain      EXAM:  Vital Signs Last 24 Hrs  T(C): 36.7 (20 Feb 2019 04:06), Max: 36.9 (19 Feb 2019 21:54)  T(F): 98 (20 Feb 2019 04:06), Max: 98.4 (19 Feb 2019 21:54)  HR: 65 (20 Feb 2019 04:06) (65 - 78)  BP: 143/71 (20 Feb 2019 04:06) (126/84 - 150/76)  BP(mean): --  RR: 18 (20 Feb 2019 04:06) (18 - 18)  SpO2: 99% (20 Feb 2019 04:06) (96% - 99%)    GENERAL: The patient is awake and alert in no apparent distress.   LUNGS: clear, no wheeze    LABS/IMAGING: reviewed             02-20    137  |  95<L>  |  11  ----------------------------<  106<H>  3.9   |  25  |  3.73<H>    Ca    7.9<L>      20 Feb 2019 06:17        < from: Xray Chest 1 View- PORTABLE-Urgent (02.17.19 @ 10:24) >  IMPRESSION:   Haziness at medial left lung base - due to atelectasis or pneumonia.  Interval development of right pleural effusion; increased reticulation in   the right lung.    < end of copied text >        < from: CT Angio Chest w/ IV Cont (02.11.19 @ 20:37) >  IMPRESSION:   No pulmonary embolism.    Debris/secretions within the trachea and left main bronchus. Scattered   patchy/groundglassleft lower lobe opacities may be infectious in   etiology. This constellation of findings can be seen in the setting of   aspiration.    Unilateral interlobular septal thickening on the right as seen on the   prior CT of the chest dated 1/23/2019. Lymphangitic spread of malignancy   cannot be excluded.    Right paratracheal, subcarinal and hilar lymphadenopathy, overall not   significant change from 1/23/2019.    Consider PET/CT for further evaluation.    < end of copied text >      PROBLEM LIST:  63y Female with HEALTH ISSUES - PROBLEM Dx:  Type 1 diabetes mellitus with diabetic peripheral angiopathy without gangrene  ESRD (end stage renal disease)  Pneumonia       RECS:  -s/p HCAP coverage  -for thoracic surgery next week for abnormal chest ct with  however, given findings of LHC would need cardiac clearance prior to this.  May not be safe for anesthesia.  -hd per renal    Alem Tate MD   [phone]

## 2019-02-20 NOTE — DIETITIAN INITIAL EVALUATION ADULT. - NS AS NUTRI INTERV ED CONTENT
Other (specify)/Pt declines nutrition education at this time. Pt states she has received education in the past. RD remains available.

## 2019-02-20 NOTE — DIETITIAN INITIAL EVALUATION ADULT. - NUTRITIONGOAL OUTCOME1
minimum assist (75% patients effort)/LLE only
Pt will meet >75% estimated nutrient needs within therapeutic diet recommendations

## 2019-02-20 NOTE — DIETITIAN INITIAL EVALUATION ADULT. - NS FNS WEIGHT USED FOR CALC
118 lbs (wt used is close to pt reported dry wt of 122 lbs). Fluids deferred to medical team./dosing

## 2019-02-20 NOTE — DIETITIAN INITIAL EVALUATION ADULT. - PROBLEM SELECTOR PLAN 4
Pending HD in AM  Nephrology consult appreciated  Renally dose medications.  Pt with elevated troponin, slightly uptrending but in setting of ESRD and CHF and infection. Will recheck troponin T @ 8AM.  Will need cardiology consult with patient's cardiologist Dr. Christianson @ 8AM who saw her during last admission as well.   EKG and presentation make ACS less likely.

## 2019-02-20 NOTE — PROGRESS NOTE ADULT - SUBJECTIVE AND OBJECTIVE BOX
[facility]   [phone]  RENAL FOLLOW UP NOTE  --------------------------------------------------------------------------------  Chief Complaint:    24 hour events/subjective:    seen earlier.  at bedside  still with c/o fatigue     PAST HISTORY  --------------------------------------------------------------------------------  No significant changes to PMH, PSH, FHx, SHx, unless otherwise noted    ALLERGIES & MEDICATIONS  --------------------------------------------------------------------------------  Allergies    No Known Allergies    Intolerances      Standing Inpatient Medications  aspirin enteric coated 81 milliGRAM(s) Oral daily  atorvastatin 20 milliGRAM(s) Oral at bedtime  cinacalcet 60 milliGRAM(s) Oral daily  dextrose 5%. 1000 milliLiter(s) IV Continuous <Continuous>  dextrose 50% Injectable 12.5 Gram(s) IV Push once  dextrose 50% Injectable 25 Gram(s) IV Push once  dextrose 50% Injectable 25 Gram(s) IV Push once  dextrose 50% Injectable 25 milliLiter(s) IV Push once  DULoxetine 60 milliGRAM(s) Oral daily  heparin  Injectable 5000 Unit(s) SubCutaneous every 12 hours  hydrALAZINE 100 milliGRAM(s) Oral every 8 hours  insulin detemir injectable (LEVEMIR) 3 Unit(s) SubCutaneous at bedtime  insulin lispro (HumaLOG) corrective regimen sliding scale   SubCutaneous three times a day before meals  insulin lispro (HumaLOG) corrective regimen sliding scale   SubCutaneous at bedtime  insulin lispro Injectable (HumaLOG) 3 Unit(s) SubCutaneous with dinner  insulin lispro Injectable (HumaLOG) 2 Unit(s) SubCutaneous with lunch  insulin lispro Injectable (HumaLOG) 2 Unit(s) SubCutaneous with breakfast  metoprolol succinate ER 50 milliGRAM(s) Oral daily  sevelamer hydrochloride 800 milliGRAM(s) Oral three times a day with meals    PRN Inpatient Medications  dextrose 40% Gel 15 Gram(s) Oral once PRN  glucagon  Injectable 1 milliGRAM(s) IntraMuscular once PRN  nitroglycerin     SubLingual 0.4 milliGRAM(s) SubLingual every 5 minutes PRN  oxyCODONE    IR 5 milliGRAM(s) Oral every 8 hours PRN      REVIEW OF SYSTEMS  --------------------------------------------------------------------------------    Gen: denies  fevers/chills,  CVS: denies chest pain/palpitations  Resp: +  SOB/Cough  GI: Denies N/V/Abd pain  : Denies dysuria    All other systems were reviewed and are negative, except as noted.    VITALS/PHYSICAL EXAM  --------------------------------------------------------------------------------  T(C): 36.6 (02-20-19 @ 20:24), Max: 36.9 (02-19-19 @ 21:54)  HR: 69 (02-20-19 @ 20:24) (65 - 78)  BP: 155/99 (02-20-19 @ 20:24) (127/66 - 164/85)  RR: 18 (02-20-19 @ 20:24) (18 - 18)  SpO2: 98% (02-20-19 @ 20:24) (96% - 99%)  Wt(kg): --        02-19-19 @ 07:01  -  02-20-19 @ 07:00  --------------------------------------------------------  IN: 1510 mL / OUT: 2900 mL / NET: -1390 mL    02-20-19 @ 07:01  -  02-20-19 @ 20:49  --------------------------------------------------------  IN: 550 mL / OUT: 0 mL / NET: 550 mL      Physical Exam:  	  Gen: Non toxic ill appearing muscle wasting   	no jvd ,  	Pulm: decrease bs  no rales or ronchi or wheezing  	CV: RRR, S1/S2; no rub  	Abd: +BS, soft, nontender/nondistended  	: No suprapubic tenderness  	UE: Warm, no cyanosis  no clubbing,  no edema  	LE: Warm, no cyanosis  no clubbing, 1 + pitting zo		    	    LABS/STUDIES  --------------------------------------------------------------------------------              10.5   3.53  >-----------<  241      [02-20-19 @ 12:56]              33.9     137  |  95  |  11  ----------------------------<  106      [02-20-19 @ 06:17]  3.9   |  25  |  3.73        Ca     7.9     [02-20-19 @ 06:17]            Creatinine Trend:  SCr 3.73 [02-20 @ 06:17]  SCr 6.27 [02-19 @ 10:43]  SCr 4.56 [02-18 @ 05:42]  SCr 4.74 [02-16 @ 07:35]  SCr 3.94 [02-15 @ 16:10]                  PTH -- (Ca 8.3)      [03-03-18 @ 08:53]   134  HbA1c 7.6      [02-13-19 @ 08:42]  TSH 0.71      [11-17-18 @ 08:25]  Lipid: chol 113, TG 86, HDL 59, LDL 37      [04-30-18 @ 06:44]

## 2019-02-20 NOTE — DIETITIAN INITIAL EVALUATION ADULT. - OTHER INFO
Pt seen for length of stay on 6TOW. Pt reports good/fair po intake in-patient, varies at times depending on if she likes the meal. Pt declines all forms of nutrition supplementation. No c/o nausea, vomiting, diarrhea, or constipation and denies chewing/swallowing difficulties despite missing dentition. Per chart, pt only uses dentures when consuming tougher foods. Defer diet texture to SLP. Pt UBW reported as ~122 lbs and pt dosing wt noted as 118 lbs, within range of her reported UBW. Weight history per previous RD notes as follows: (01/14/2018) 116.6 pounds, (03/02/2018) 119.2 pounds, (05/01/2018) 116.8 pounds, (12/19/2018) 118 pounds, (01/23) 121.3 pounds. Pt likely with h/o fluid shifts. On current admission weights noted as follows post HD: 127 lbs (02-14), 124 lbs (02-16), and 117 lbs (02-19). Pt reports taking her insulin as scheduled and following up with her endocrinologist. Pt notes SMBG at home 6x/daily with a usual range post meal of 150-180. HgbA1c noted as 7.6% indicating adequate BG control PTA, noted decreased from HgbA1c 11/2018 of 8.6%. Pt notes no hypoglycemia at home. Pt declines review of nutrition education at this time, she has received education in the past, "I have all the written stuff at home." Pt seen for length of stay on 6TOW. Pt reports good/fair po intake in-patient, varies at times depending on if she likes the meal. Pt declines all forms of nutrition supplementation. No c/o nausea, vomiting, diarrhea, or constipation and denies chewing/swallowing difficulties despite missing dentition. Per chart, pt only uses dentures when consuming tougher foods. Defer diet texture to SLP. Pt UBW reported as ~122 lbs and pt dosing wt noted as 118 lbs, within range of her reported UBW. Weight history per previous RD notes as follows: (01/14/2018) 116.6 pounds, (03/02/2018) 119.2 pounds, (05/01/2018) 116.8 pounds, (12/19/2018) 118 pounds, (01/23) 121.3 pounds. Pt likely with h/o fluid shifts, noted wt fluctuations seem to be within ~5 lb range. On current admission weights noted as follows post HD: 127 lbs (02-14), 124 lbs (02-16), and 117 lbs (02-19). Pt reports taking her insulin as scheduled and following up with her endocrinologist. Pt notes SMBG at home 6x/daily with a usual range post meal of 150-180. HgbA1c noted as 7.6% indicating adequate BG control PTA, noted decreased from HgbA1c 11/2018 of 8.6%. Pt notes no hypoglycemia at home. Pt declines review of nutrition education at this time, she has received education in the past, "I have all the written stuff at home." Pt seen for length of stay on 6TOW. Pt reports good/fair po intake in-patient, varies at times depending on if she likes the meal. Pt declines all forms of nutrition supplementation. No c/o nausea, vomiting, diarrhea, or constipation and denies chewing/swallowing difficulties despite missing dentition. Per chart, pt only uses dentures when consuming tougher foods. Defer diet texture to SLP. Pt UBW reported as ~122 lbs and pt dosing wt noted as 118 lbs, within range of her reported UBW. Weight history per previous RD notes as follows: (01/14/2018) 116.6 pounds, (03/02/2018) 119.2 pounds, (05/01/2018) 116.8 pounds, (12/19/2018) 118 pounds, (01/23) 121.3 pounds. Pt likely with h/o fluid shifts, noted wt fluctuations seem to be within ~5 lb range. On current admission weights noted as follows post HD: 127 lbs (02-14), 124 lbs (02-16), and 117 lbs (02-19). Pt reports taking her insulin as scheduled and following up with her endocrinologist. Pt notes SMBG at home 6x/daily with a usual range post meal of 150-180. HgbA1c noted as 7.6%, noted decreased from HgbA1c 11/2018 of 8.6%, however accuracy of HgbA1c should be considered in setting of ongoing HD. Pt notes no hypoglycemia at home. Pt declines review of nutrition education at this time, she has received education in the past, "I have all the written stuff at home."

## 2019-02-20 NOTE — DIETITIAN INITIAL EVALUATION ADULT. - NS AS NUTRI INTERV MEALS SNACK
General/healthful diet/Recommend change diet to Consistent Carbohydrate (with evening snacks) + Renal in order to provide pt with more food choices at meals and optimize po intake. Continue to obtain/provide food preferences as feasible.

## 2019-02-20 NOTE — PROGRESS NOTE ADULT - SUBJECTIVE AND OBJECTIVE BOX
Diabetes Follow up note: Saw pt earlier today  Interval Hx: 64 yo woman with uncontrolled type 1 diabetes (well known from prior admissions) w/ESRD on HD, neuropathy, CAD, CVA, CHF w/ very insulin sensitive.  Non-adherent to diet at home with micro and macrovascular complications admitted for shortness of breath and pneumonia completed IV abx therapy.  S/p cardiac cath yesterday with noted restenosis of prior interventions >Medically treated. Pt noted to have hypoglycemia after procedure yesterday and with BG in 70s this am. Pt has hypoglycemia unawareness. Reports not eating because she dislikes hospital food. Had some breakfast but refused lunch.       Review of Systems:  General: Denies pain  GI: Tolerating POs without any N/V/D/ABD PAIN.  CV: No CP/SOB  ENDO: No S&Sx of hypoglycemia + hypoglycemia unawareness.     MEDICATIONS  (STANDING):  atorvastatin 20 milliGRAM(s) Oral at bedtime  cinacalcet 60 milliGRAM(s) Oral daily  insulin detemir injectable (LEVEMIR) 4 Unit(s) SubCutaneous at bedtime  insulin lispro (HumaLOG) corrective regimen sliding scale   SubCutaneous three times a day before meals  insulin lispro (HumaLOG) corrective regimen sliding scale   SubCutaneous at bedtime  insulin lispro Injectable (HumaLOG) 3 Unit(s) SubCutaneous before dinner  insulin lispro Injectable (HumaLOG) 2 Unit(s) SubCutaneous before breakfast  insulin lispro Injectable (HumaLOG) 2 Unit(s) SubCutaneous before lunch    Allergies    No Known Allergies      PE:  General: Female laying in bed in NAD.   Vital Signs Last 24 Hrs  T(C): 36.7 (02-20-19 @ 12:20), Max: 36.9 (02-19-19 @ 21:54)  T(F): 98 (02-20-19 @ 12:20), Max: 98.4 (02-19-19 @ 21:54)  HR: 68 (02-20-19 @ 12:58) (65 - 78)  BP: 160/74 (02-20-19 @ 12:58) (127/66 - 164/85)  BP(mean): --  RR: 18 (02-20-19 @ 12:20) (18 - 18)  SpO2: 97% (02-20-19 @ 12:20) (96% - 99%)  Abd: Soft, NT, ND  Extremities: Warm. L AV fistula in place. LLE with some trace edema around ankle.  Neuro: A&O X3    LABS:  POCT Blood Glucose.: 124 mg/dL (02-20-19 @ 13:02)  POCT Blood Glucose.: 98 mg/dL (02-20-19 @ 11:40)  POCT Blood Glucose.: 75 mg/dL (02-20-19 @ 07:49)  POCT Blood Glucose.: 74 mg/dL (02-20-19 @ 07:47)  POCT Blood Glucose.: 118 mg/dL (02-19-19 @ 22:13)  POCT Blood Glucose.: 125 mg/dL (02-19-19 @ 16:16)  POCT Blood Glucose.: 119 mg/dL (02-19-19 @ 13:07)  POCT Blood Glucose.: 59 mg/dL (02-19-19 @ 12:08)  POCT Blood Glucose.: 56 mg/dL (02-19-19 @ 12:07)  POCT Blood Glucose.: 55 mg/dL (02-19-19 @ 11:47)  POCT Blood Glucose.: 55 mg/dL (02-19-19 @ 11:46)  POCT Blood Glucose.: 101 mg/dL (02-19-19 @ 07:46)  POCT Blood Glucose.: 191 mg/dL (02-18-19 @ 22:23)  POCT Blood Glucose.: 171 mg/dL (02-18-19 @ 21:11)  POCT Blood Glucose.: 100 mg/dL (02-18-19 @ 16:47)                           10.5   3.53  )-----------( 241      ( 20 Feb 2019 12:56 )             33.9       02-20    137  |  95<L>  |  11  ----------------------------<  106<H>  3.9   |  25  |  3.73<H>    Ca    7.9<L>      20 Feb 2019 06:17    Hemoglobin A1C, Whole Blood: 7.6 % <H> [4.0 - 5.6] (02-13-19 @ 08:42)

## 2019-02-20 NOTE — PROGRESS NOTE ADULT - SUBJECTIVE AND OBJECTIVE BOX
Cardiovascular Disease Progress Note    Overnight events: No acute events overnight.  s/p LHC with diffuse and severe instent restenosis in RCA, unable to stent due to multiple layers of stent. plan to manage medically for now. denies any ischemic sx  Otherwise review of systems negative    Objective Findings:  T(C): 36.7 (19 @ 04:06), Max: 36.9 (19 @ 21:54)  HR: 65 (19 @ 04:06) (65 - 78)  BP: 143/71 (19 @ 04:06) (126/84 - 156/80)  RR: 18 (19 @ 04:06) (18 - 18)  SpO2: 99% (19 @ 04:06) (94% - 99%)  Wt(kg): --  Daily     Daily Weight in k.1 (2019 21:54)      Physical Exam:  Gen: NAD  HEENT: EOMI  CV: RRR, normal S1 + S2, no m/r/g  Lungs: CTAB  Abd: soft, non-tender  Ext: No edema    Telemetry: nsr    Laboratory Data:                        9.6    4.61  )-----------( 226      ( 2019 09:25 )             30.8     02-20    137  |  95<L>  |  11  ----------------------------<  106<H>  3.9   |  25  |  3.73<H>    Ca    7.9<L>      2019 06:17                Inpatient Medications:  MEDICATIONS  (STANDING):  aspirin enteric coated 81 milliGRAM(s) Oral daily  atorvastatin 20 milliGRAM(s) Oral at bedtime  cinacalcet 60 milliGRAM(s) Oral daily  dextrose 5%. 1000 milliLiter(s) (50 mL/Hr) IV Continuous <Continuous>  dextrose 50% Injectable 12.5 Gram(s) IV Push once  dextrose 50% Injectable 25 Gram(s) IV Push once  dextrose 50% Injectable 25 Gram(s) IV Push once  dextrose 50% Injectable 25 milliLiter(s) IV Push once  DULoxetine 60 milliGRAM(s) Oral daily  heparin  Injectable 5000 Unit(s) SubCutaneous every 12 hours  hydrALAZINE 100 milliGRAM(s) Oral every 8 hours  insulin detemir injectable (LEVEMIR) 4 Unit(s) SubCutaneous at bedtime  insulin lispro (HumaLOG) corrective regimen sliding scale   SubCutaneous three times a day before meals  insulin lispro (HumaLOG) corrective regimen sliding scale   SubCutaneous at bedtime  insulin lispro Injectable (HumaLOG) 3 Unit(s) SubCutaneous before dinner  insulin lispro Injectable (HumaLOG) 2 Unit(s) SubCutaneous before breakfast  insulin lispro Injectable (HumaLOG) 2 Unit(s) SubCutaneous before lunch  metoprolol succinate ER 50 milliGRAM(s) Oral daily  sevelamer hydrochloride 800 milliGRAM(s) Oral three times a day with meals      Assessment:  - DKA  -lactic acidosis  -elevated cardiac enzymes, chest pain, non specific ekg changes  -CHF, volume overload. acute on chronic systolic HF  -possible PNA  -ischemic cardiomyopathy, cad s/p multiple stents  -esrd on hd  -PAD s/p prior intervention   -remote TIA      Recs:  -appreciate renal recs. s/p HD. currently appears euvolemic  -DKA resolved. f/u endo recs  -new onset atypical chest pain. ekg with nonspecific ST changes. elevated hs-trop with negative delta. s/p LHC with Dr Vela  --> severe and diffuse instent restenosis along RCA --> unable to stent due to multiple layers of stenting and prior brachytherapy. will plan to manage medically for now. denies any ischemic sx at present  -hx of prolonged qtc. avoid qtc prolonging meds  -CT chest findings concerning for malignancy and possible left lower lobe pna. follows with dr drake and dr michael. scheduled for surgery at [facility] in [facility] week. s/p abx for pna  -c/w asa, plavix, statin for ischemic cardiomyopathy/CAD/PAD if not contraindicated  -c/w toprol and hydralazine for ischemic cardiomyopathy and HTN   -dvt ppx      Over 25 minutes spent on total encounter; more than 50% of the visit was spent counseling and/or coordinating care by the attending physician.      Julián Biswas MD   Cardiovascular Disease  [phone]

## 2019-02-21 ENCOUNTER — TRANSCRIPTION ENCOUNTER (OUTPATIENT)
Age: 64
End: 2019-02-21

## 2019-02-21 VITALS
HEART RATE: 82 BPM | SYSTOLIC BLOOD PRESSURE: 152 MMHG | RESPIRATION RATE: 18 BRPM | TEMPERATURE: 98 F | OXYGEN SATURATION: 97 % | DIASTOLIC BLOOD PRESSURE: 74 MMHG

## 2019-02-21 LAB
ANION GAP SERPL CALC-SCNC: 20 MMOL/L — HIGH (ref 5–17)
BUN SERPL-MCNC: 14 MG/DL — SIGNIFICANT CHANGE UP (ref 7–23)
CALCIUM SERPL-MCNC: 7.3 MG/DL — LOW (ref 8.4–10.5)
CHLORIDE SERPL-SCNC: 93 MMOL/L — LOW (ref 96–108)
CO2 SERPL-SCNC: 20 MMOL/L — LOW (ref 22–31)
CREAT SERPL-MCNC: 5.01 MG/DL — HIGH (ref 0.5–1.3)
GLUCOSE BLDC GLUCOMTR-MCNC: 169 MG/DL — HIGH (ref 70–99)
GLUCOSE BLDC GLUCOMTR-MCNC: 172 MG/DL — HIGH (ref 70–99)
GLUCOSE BLDC GLUCOMTR-MCNC: 200 MG/DL — HIGH (ref 70–99)
GLUCOSE SERPL-MCNC: 199 MG/DL — HIGH (ref 70–99)
HCT VFR BLD CALC: 31.5 % — LOW (ref 34.5–45)
HCT VFR BLD CALC: 35.8 % — SIGNIFICANT CHANGE UP (ref 34.5–45)
HGB BLD-MCNC: 11.3 G/DL — LOW (ref 11.5–15.5)
HGB BLD-MCNC: 9.8 G/DL — LOW (ref 11.5–15.5)
MCHC RBC-ENTMCNC: 31.1 GM/DL — LOW (ref 32–36)
MCHC RBC-ENTMCNC: 31.6 GM/DL — LOW (ref 32–36)
MCHC RBC-ENTMCNC: 32.2 PG — SIGNIFICANT CHANGE UP (ref 27–34)
MCHC RBC-ENTMCNC: 33.1 PG — SIGNIFICANT CHANGE UP (ref 27–34)
MCV RBC AUTO: 103.6 FL — HIGH (ref 80–100)
MCV RBC AUTO: 105 FL — HIGH (ref 80–100)
PHOSPHATE SERPL-MCNC: 3.5 MG/DL — SIGNIFICANT CHANGE UP (ref 2.5–4.5)
PLATELET # BLD AUTO: 259 K/UL — SIGNIFICANT CHANGE UP (ref 150–400)
PLATELET # BLD AUTO: 264 K/UL — SIGNIFICANT CHANGE UP (ref 150–400)
POTASSIUM SERPL-MCNC: 3.7 MMOL/L — SIGNIFICANT CHANGE UP (ref 3.5–5.3)
POTASSIUM SERPL-SCNC: 3.7 MMOL/L — SIGNIFICANT CHANGE UP (ref 3.5–5.3)
RBC # BLD: 3.04 M/UL — LOW (ref 3.8–5.2)
RBC # BLD: 3.41 M/UL — LOW (ref 3.8–5.2)
RBC # FLD: 14.8 % — HIGH (ref 10.3–14.5)
RBC # FLD: 14.9 % — HIGH (ref 10.3–14.5)
SODIUM SERPL-SCNC: 133 MMOL/L — LOW (ref 135–145)
WBC # BLD: 4.78 K/UL — SIGNIFICANT CHANGE UP (ref 3.8–10.5)
WBC # BLD: 5.08 K/UL — SIGNIFICANT CHANGE UP (ref 3.8–10.5)
WBC # FLD AUTO: 4.78 K/UL — SIGNIFICANT CHANGE UP (ref 3.8–10.5)
WBC # FLD AUTO: 5.08 K/UL — SIGNIFICANT CHANGE UP (ref 3.8–10.5)

## 2019-02-21 PROCEDURE — 92610 EVALUATE SWALLOWING FUNCTION: CPT

## 2019-02-21 PROCEDURE — 94640 AIRWAY INHALATION TREATMENT: CPT

## 2019-02-21 PROCEDURE — 85730 THROMBOPLASTIN TIME PARTIAL: CPT

## 2019-02-21 PROCEDURE — 87045 FECES CULTURE AEROBIC BACT: CPT

## 2019-02-21 PROCEDURE — 83970 ASSAY OF PARATHORMONE: CPT

## 2019-02-21 PROCEDURE — 99285 EMERGENCY DEPT VISIT HI MDM: CPT | Mod: 25

## 2019-02-21 PROCEDURE — 83735 ASSAY OF MAGNESIUM: CPT

## 2019-02-21 PROCEDURE — C1894: CPT

## 2019-02-21 PROCEDURE — 87177 OVA AND PARASITES SMEARS: CPT

## 2019-02-21 PROCEDURE — 87324 CLOSTRIDIUM AG IA: CPT

## 2019-02-21 PROCEDURE — 83036 HEMOGLOBIN GLYCOSYLATED A1C: CPT

## 2019-02-21 PROCEDURE — 71045 X-RAY EXAM CHEST 1 VIEW: CPT

## 2019-02-21 PROCEDURE — 99232 SBSQ HOSP IP/OBS MODERATE 35: CPT

## 2019-02-21 PROCEDURE — 80053 COMPREHEN METABOLIC PANEL: CPT

## 2019-02-21 PROCEDURE — 93970 EXTREMITY STUDY: CPT

## 2019-02-21 PROCEDURE — 83605 ASSAY OF LACTIC ACID: CPT

## 2019-02-21 PROCEDURE — 82550 ASSAY OF CK (CPK): CPT

## 2019-02-21 PROCEDURE — 87581 M.PNEUMON DNA AMP PROBE: CPT

## 2019-02-21 PROCEDURE — 85014 HEMATOCRIT: CPT

## 2019-02-21 PROCEDURE — 82330 ASSAY OF CALCIUM: CPT

## 2019-02-21 PROCEDURE — 87633 RESP VIRUS 12-25 TARGETS: CPT

## 2019-02-21 PROCEDURE — 84100 ASSAY OF PHOSPHORUS: CPT

## 2019-02-21 PROCEDURE — 82947 ASSAY GLUCOSE BLOOD QUANT: CPT

## 2019-02-21 PROCEDURE — 84132 ASSAY OF SERUM POTASSIUM: CPT

## 2019-02-21 PROCEDURE — 80048 BASIC METABOLIC PNL TOTAL CA: CPT

## 2019-02-21 PROCEDURE — 85610 PROTHROMBIN TIME: CPT

## 2019-02-21 PROCEDURE — 99261: CPT

## 2019-02-21 PROCEDURE — 96374 THER/PROPH/DIAG INJ IV PUSH: CPT | Mod: XU

## 2019-02-21 PROCEDURE — 82310 ASSAY OF CALCIUM: CPT

## 2019-02-21 PROCEDURE — 85027 COMPLETE CBC AUTOMATED: CPT

## 2019-02-21 PROCEDURE — 82803 BLOOD GASES ANY COMBINATION: CPT

## 2019-02-21 PROCEDURE — 82010 KETONE BODYS QUAN: CPT

## 2019-02-21 PROCEDURE — 87046 STOOL CULTR AEROBIC BACT EA: CPT

## 2019-02-21 PROCEDURE — 83880 ASSAY OF NATRIURETIC PEPTIDE: CPT

## 2019-02-21 PROCEDURE — 93005 ELECTROCARDIOGRAM TRACING: CPT

## 2019-02-21 PROCEDURE — 84484 ASSAY OF TROPONIN QUANT: CPT

## 2019-02-21 PROCEDURE — 84295 ASSAY OF SERUM SODIUM: CPT

## 2019-02-21 PROCEDURE — 87486 CHLMYD PNEUM DNA AMP PROBE: CPT

## 2019-02-21 PROCEDURE — 80202 ASSAY OF VANCOMYCIN: CPT

## 2019-02-21 PROCEDURE — 93458 L HRT ARTERY/VENTRICLE ANGIO: CPT

## 2019-02-21 PROCEDURE — 82962 GLUCOSE BLOOD TEST: CPT

## 2019-02-21 PROCEDURE — C1887: CPT

## 2019-02-21 PROCEDURE — C1769: CPT

## 2019-02-21 PROCEDURE — 87798 DETECT AGENT NOS DNA AMP: CPT

## 2019-02-21 PROCEDURE — 82553 CREATINE MB FRACTION: CPT

## 2019-02-21 PROCEDURE — 96375 TX/PRO/DX INJ NEW DRUG ADDON: CPT

## 2019-02-21 PROCEDURE — 71275 CT ANGIOGRAPHY CHEST: CPT

## 2019-02-21 PROCEDURE — 87449 NOS EACH ORGANISM AG IA: CPT

## 2019-02-21 PROCEDURE — 99152 MOD SED SAME PHYS/QHP 5/>YRS: CPT

## 2019-02-21 PROCEDURE — 82435 ASSAY OF BLOOD CHLORIDE: CPT

## 2019-02-21 RX ORDER — ENOXAPARIN SODIUM 100 MG/ML
3 INJECTION SUBCUTANEOUS
Qty: 0 | Refills: 0 | COMMUNITY
Start: 2019-02-21

## 2019-02-21 RX ORDER — HYDROMORPHONE HYDROCHLORIDE 2 MG/ML
0.5 INJECTION INTRAMUSCULAR; INTRAVENOUS; SUBCUTANEOUS ONCE
Qty: 0 | Refills: 0 | Status: DISCONTINUED | OUTPATIENT
Start: 2019-02-21 | End: 2019-02-21

## 2019-02-21 RX ORDER — ENOXAPARIN SODIUM 100 MG/ML
10 INJECTION SUBCUTANEOUS
Qty: 0 | Refills: 0 | COMMUNITY
Start: 2019-02-21

## 2019-02-21 RX ORDER — INSULIN DETEMIR 100/ML (3)
3 INSULIN PEN (ML) SUBCUTANEOUS
Qty: 1 | Refills: 0 | OUTPATIENT
Start: 2019-02-21 | End: 2019-03-22

## 2019-02-21 RX ORDER — OXYCODONE HYDROCHLORIDE 5 MG/1
1 TABLET ORAL
Qty: 15 | Refills: 0 | OUTPATIENT
Start: 2019-02-21 | End: 2019-02-25

## 2019-02-21 RX ORDER — INSULIN LISPRO 100/ML
5 VIAL (ML) SUBCUTANEOUS
Qty: 1 | Refills: 0 | OUTPATIENT
Start: 2019-02-21 | End: 2019-03-22

## 2019-02-21 RX ORDER — NITROGLYCERIN 6.5 MG
1 CAPSULE, EXTENDED RELEASE ORAL
Qty: 30 | Refills: 0 | OUTPATIENT
Start: 2019-02-21 | End: 2019-03-22

## 2019-02-21 RX ORDER — INSULIN LISPRO 100/ML
2 VIAL (ML) SUBCUTANEOUS
Qty: 1 | Refills: 0 | OUTPATIENT
Start: 2019-02-21 | End: 2019-03-22

## 2019-02-21 RX ORDER — ENOXAPARIN SODIUM 100 MG/ML
5 INJECTION SUBCUTANEOUS
Qty: 0 | Refills: 0 | COMMUNITY
Start: 2019-02-21

## 2019-02-21 RX ORDER — POTASSIUM CHLORIDE 20 MEQ
10 PACKET (EA) ORAL ONCE
Qty: 0 | Refills: 0 | Status: COMPLETED | OUTPATIENT
Start: 2019-02-21 | End: 2019-02-21

## 2019-02-21 RX ADMIN — DULOXETINE HYDROCHLORIDE 60 MILLIGRAM(S): 30 CAPSULE, DELAYED RELEASE ORAL at 09:23

## 2019-02-21 RX ADMIN — HYDROMORPHONE HYDROCHLORIDE 0.5 MILLIGRAM(S): 2 INJECTION INTRAMUSCULAR; INTRAVENOUS; SUBCUTANEOUS at 01:19

## 2019-02-21 RX ADMIN — Medication 10 MILLIEQUIVALENT(S): at 09:23

## 2019-02-21 RX ADMIN — Medication 3 UNIT(S): at 17:44

## 2019-02-21 RX ADMIN — Medication 50 MILLIGRAM(S): at 06:03

## 2019-02-21 RX ADMIN — Medication 1: at 07:55

## 2019-02-21 RX ADMIN — HYDROMORPHONE HYDROCHLORIDE 0.5 MILLIGRAM(S): 2 INJECTION INTRAMUSCULAR; INTRAVENOUS; SUBCUTANEOUS at 01:49

## 2019-02-21 RX ADMIN — Medication 2 UNIT(S): at 07:55

## 2019-02-21 RX ADMIN — Medication 2 UNIT(S): at 11:43

## 2019-02-21 RX ADMIN — HYDROMORPHONE HYDROCHLORIDE 0.5 MILLIGRAM(S): 2 INJECTION INTRAMUSCULAR; INTRAVENOUS; SUBCUTANEOUS at 12:08

## 2019-02-21 RX ADMIN — SEVELAMER CARBONATE 800 MILLIGRAM(S): 2400 POWDER, FOR SUSPENSION ORAL at 17:42

## 2019-02-21 RX ADMIN — SEVELAMER CARBONATE 800 MILLIGRAM(S): 2400 POWDER, FOR SUSPENSION ORAL at 07:55

## 2019-02-21 RX ADMIN — HYDROMORPHONE HYDROCHLORIDE 0.5 MILLIGRAM(S): 2 INJECTION INTRAMUSCULAR; INTRAVENOUS; SUBCUTANEOUS at 11:35

## 2019-02-21 RX ADMIN — Medication 1: at 17:44

## 2019-02-21 RX ADMIN — SEVELAMER CARBONATE 800 MILLIGRAM(S): 2400 POWDER, FOR SUSPENSION ORAL at 11:36

## 2019-02-21 RX ADMIN — Medication 1: at 11:43

## 2019-02-21 RX ADMIN — CINACALCET 60 MILLIGRAM(S): 30 TABLET, FILM COATED ORAL at 09:23

## 2019-02-21 RX ADMIN — Medication 81 MILLIGRAM(S): at 09:23

## 2019-02-21 RX ADMIN — Medication 100 MILLIGRAM(S): at 06:01

## 2019-02-21 NOTE — PROGRESS NOTE ADULT - PROVIDER SPECIALTY LIST ADULT
Cardiology
Endocrinology
Internal Medicine
Nephrology
Pulmonology
Cardiology
Endocrinology
Internal Medicine
Nephrology
Endocrinology

## 2019-02-21 NOTE — DISCHARGE NOTE ADULT - PLAN OF CARE
Likely from aspirated Pneumonia. now resolved. s/p antibiotic. no signs and symptoms of infection. WBC stable.  Pneumonia is a lung infection that can cause a fever, cough, and trouble breathing.  Continue all antibiotics as ordered until complete.  Nutrition is important, eat small frequent meals.  Get lots of rest and drink fluids.  Call your health care provider upon arrival home from hospital and make a follow up appointment for one week.  If your cough worsens, you develop fever greater than 101', you have shaking chills, a fast heartbeat, trouble breathing and/or feel your are breathing much faster than usual, call your healthcare provider.  Make sure you wash your hands frequently. stable. Now resolved.  Diabetic ketoacidosis (DKA) is a life-threatening condition caused by dangerously high blood sugar levels. Your blood sugar levels become high because your body does not have enough insulin.  Call 911 for any of the following:  •You have a seizure.  •You begin to breathe fast, or are short of breath.  •You become weak and confused.  Seek care immediately if:  •You are more drowsy than usual. Hemodialysis is the most common method used to treat advanced and permanent kidney failure. But even with better procedures and equipment, hemodialysis is still a complicated and inconvenient therapy that requires a coordinated effort from your whole health care team, including your nephrologist, dialysis nurse, dialysis technician, dietitian, and . The most important members of your health care team are you and your family.   Healthy kidneys clean your blood by removing excess fluid, minerals, and wastes. When your kidneys fail, harmful wastes build up in your body, your blood pressure may rise, and your body may retain excess fluid and not make enough red blood cells. When this happens, you need treatment to replace the work of your failed kidneys. In hemodialysis, your blood is allowed to flow, a few ounces at a time, through a special filter that removes wastes and extra fluids. The clean blood is then returned to your body. One of the biggest adjustments you must make when you start hemodialysis treatments is following a strict schedule. Most patients go to a dialysis center three times a week for 3 to 5 or more hours each visit.   Some of the more common conditions caused by kidney failure are extreme tiredness, bone problems, joint problems, itching, and "restless legs”.  Many people treated with hemodialysis complain of itchy skin, which is often worse during or just after treatment.  Patients on dialysis often have insomnia, and some people have a specific problem called the sleep apnea syndrome.  Over time, these sleep disturbances can lead to "day-night reversal" (insomnia at night, sleepiness during the day), headache, depression, and decreased alertness.   Sleep disorders may seem unimportant, but they can impair your quality of life. Don't hesitate to raise these problems with your nurse, doctor, or  Type 1 diabetes develops because the immune system destroys cells in the pancreas that make insulin. The pancreas cannot make enough insulin, so the blood sugar level continues to rise.  Seek care immediately if:  •Your blood sugar level is above 240 mg/dL and does not come down with treatment.  •You have signs of high blood sugar levels, such as blurred or double vision.  •You have signs of high ketone levels, such as breath has a fruity, sweet smell, or your breathing is shallow.  •You have symptoms of a low blood sugar level, such as trouble thinking, sweating, or a pounding heartbeat.  •Your blood sugar level is lower than normal and it does not improve with treatment. for thoracic surgery next week for abnormal chest ct with  however, given findings of LHC would need cardiac clearance prior to this.  May not be safe for anesthesia. s/p [facility] with Dr Vela 2/20 --> severe and diffuse in stent restenosis along RCA --> unable to stent due to multiple layers of stenting and prior brachytherapy.  HOME CARE INSTRUCTIONS  For the next few days, avoid physical activities that bring on chest pain. Continue physical activities as directed.  Do not smoke.  Avoid drinking alcohol.   Only take over-the-counter or prescription medicine for pain, discomfort, or fever as directed by your caregiver.  Follow your caregiver's suggestions for further testing if your chest pain does not go away.  Keep any follow-up appointments you made. If you do not go to an appointment, you could develop lasting (chronic) problems with pain. If there is any problem keeping an appointment, you must call to reschedule.   SEEK MEDICAL CARE IF:  You think you are having problems from the medicine you are taking. Read your medicine instructions carefully.  Your chest pain does not go away, even after treatment.  You develop a rash with blisters on your chest.  SEEK IMMEDIATE MEDICAL CARE IF:  You have increased chest pain or pain that spreads to your arm, neck, jaw, back, or abdomen.   You develop shortness of breath, an increasing cough, or you are coughing up blood.  You have severe back or abdominal pain, feel nauseous, or vomit.  You develop severe weakness, fainting, or chills.  You have a fever. Now resolved. continue with lantus 3untis at bedtime. humalog kwikpen 2 units before breakfast and lunch, and 3units before dinner.  Diabetic ketoacidosis (DKA) is a life-threatening condition caused by dangerously high blood sugar levels. Your blood sugar levels become high because your body does not have enough insulin.  Call 911 for any of the following:  •You have a seizure.  •You begin to breathe fast, or are short of breath.  •You become weak and confused.  Seek care immediately if:  •You are more drowsy than usual. If you have chest pain, take nitroglycerin 0.4mg, if not resolved, take another nitro every 5mins for total 3 tabs maximum.   s/p [facility] with Dr Vela 2/20 --> severe and diffuse in stent restenosis along RCA --> unable to stent due to multiple layers of stenting and prior brachytherapy.  HOME CARE INSTRUCTIONS  For the next few days, avoid physical activities that bring on chest pain. Continue physical activities as directed.  Do not smoke.  Avoid drinking alcohol.   Only take over-the-counter or prescription medicine for pain, discomfort, or fever as directed by your caregiver.  Follow your caregiver's suggestions for further testing if your chest pain does not go away.  Keep any follow-up appointments you made. If you do not go to an appointment, you could develop lasting (chronic) problems with pain. If there is any problem keeping an appointment, you must call to reschedule.   SEEK MEDICAL CARE IF:  You think you are having problems from the medicine you are taking. Read your medicine instructions carefully.  Your chest pain does not go away, even after treatment.  You develop a rash with blisters on your chest.  SEEK IMMEDIATE MEDICAL CARE IF:  You have increased chest pain or pain that spreads to your arm, neck, jaw, back, or abdomen.   You develop shortness of breath, an increasing cough, or you are coughing up blood.  You have severe back or abdominal pain, feel nauseous, or vomit.  You develop severe weakness, fainting, or chills.  You have a fever.

## 2019-02-21 NOTE — DISCHARGE NOTE ADULT - ADDITIONAL INSTRUCTIONS
Please make an appointment with your PMD in 1 week.  follow up with CT surgery (Dr. Thurston) at [facility] in 1 week.  follow up with your endocrinologist  (DR. Ley) in 1 ~ 2 weeks.

## 2019-02-21 NOTE — DISCHARGE NOTE ADULT - PROVIDER TOKENS
PROVIDER:[TOKEN:[[id number]]],PROVIDER:[TOKEN:[[id number]]],PROVIDER:[TOKEN:[[id number]]]

## 2019-02-21 NOTE — PROGRESS NOTE ADULT - SUBJECTIVE AND OBJECTIVE BOX
Diabetes Follow up note: Saw pt earlier today  Interval Hx: 62 yo woman with uncontrolled type 1 diabetes (well known from prior admissions) w/ESRD on HD, neuropathy, CAD, CVA, CHF w/ very insulin sensitive.  Non-adherent to diet at home with micro and macrovascular complications admitted for shortness of breath and pneumonia completed IV abx therapy.  S/p cardiac cath with noted restenosis of prior interventions >Medically treated. Pt reports tolerating POs but likes to eat food from outside asking family to bring food. Glycemic control now at goal while on present insulin doses. No further hypoglycemia. Wants to go home. However, per team pt had diarrhea last night and  need to wait for results of stool specimens to clear pt for discharge. Pt denies any diarrhea today.        Review of Systems:  General: Denies pain and wants to go home  GI: Tolerating POs without any N/V/D/ABD PAIN.  CV: No CP/SOB  ENDO: No S&Sx of hypoglycemia + hypoglycemia unawareness.       MEDICATIONS  (STANDING):  atorvastatin 20 milliGRAM(s) Oral at bedtime  cinacalcet 60 milliGRAM(s) Oral daily  insulin detemir injectable (LEVEMIR) 3 Unit(s) SubCutaneous at bedtime  insulin lispro (HumaLOG) corrective regimen sliding scale   SubCutaneous three times a day before meals  insulin lispro (HumaLOG) corrective regimen sliding scale   SubCutaneous at bedtime  insulin lispro Injectable (HumaLOG) 3 Unit(s) SubCutaneous before dinner  insulin lispro Injectable (HumaLOG) 2 Unit(s) SubCutaneous before breakfast  insulin lispro Injectable (HumaLOG) 2 Unit(s) SubCutaneous before lunch    Allergies    No Known Allergies      PE:  General: Female sitting at edge of bed in NAD.   Vital Signs Last 24 Hrs  T(C): 36.6 (02-21-19 @ 11:24), Max: 36.6 (02-20-19 @ 20:24)  T(F): 97.8 (02-21-19 @ 11:24), Max: 97.9 (02-20-19 @ 20:24)  HR: 68 (02-21-19 @ 11:24) (68 - 71)  BP: 168/77 (02-21-19 @ 11:24) (137/58 - 168/77)  BP(mean): --  RR: 18 (02-21-19 @ 11:24) (18 - 18)  SpO2: 97% (02-21-19 @ 11:24) (96% - 98%)  Abd: Soft, NT, ND  Extremities: Warm. L AV fistula in place. LLE with some trace edema around ankle improving.  Neuro: A&O X3    LABS:  POCT Blood Glucose.: 169 mg/dL (02-21-19 @ 11:40)  POCT Blood Glucose.: 172 mg/dL (02-21-19 @ 07:46)  POCT Blood Glucose.: 183 mg/dL (02-20-19 @ 21:09)  POCT Blood Glucose.: 135 mg/dL (02-20-19 @ 16:34)  POCT Blood Glucose.: 124 mg/dL (02-20-19 @ 13:02)  POCT Blood Glucose.: 98 mg/dL (02-20-19 @ 11:40)  POCT Blood Glucose.: 75 mg/dL (02-20-19 @ 07:49)  POCT Blood Glucose.: 74 mg/dL (02-20-19 @ 07:47)  POCT Blood Glucose.: 118 mg/dL (02-19-19 @ 22:13)  POCT Blood Glucose.: 125 mg/dL (02-19-19 @ 16:16)                        11.3   5.08  )-----------( 259      ( 21 Feb 2019 08:55 )             35.8     02-21    133<L>  |  93<L>  |  14  ----------------------------<  199<H>  3.7   |  20<L>  |  5.01<H>    Ca    7.3<L>      21 Feb 2019 06:28  Phos  3.5     02-21    Hemoglobin A1C, Whole Blood: 7.6 % <H> [4.0 - 5.6] (02-13-19 @ 08:42)

## 2019-02-21 NOTE — PROGRESS NOTE ADULT - ASSESSMENT
· Assessment		  Patient is a 64 yo F with ESRD (on HD M/Tu/Th/Sa), type 1 DM, CAD, HFrEF (EF 50% on TTE from 10/18), TIA, and PVD, recent admission here in late January for DKA with septic shock from unclear source found to have possible lung malignancy on imaging (septal thickening) and s/p MICU stay during that admission for pressor support and insulin gtt, now presenting with acute shortness of breath x 1 day associated with productive cough x 3 days noted to have likely aspiration pneumonia as well as DKA     Diabetic ketoacidosis without coma associated with type 1 diabetes mellitus.  - endocrine follow. Continue Insulin coverage.   - Treat infection.       Pneumonia of left lower lobe due to infectious organism.  - antibiotics  - Pulmonary follow.  - speech and swallow      ESRD (end stage renal disease).  - HD  - Nephrology follow.    CAD  -  cardiology follow.  - telemetry  - cardiac enzymes  - NTG prn    Pierce Viera MD pager [phone]
64 yo F with ESRD (on HD M/Tu/Th/Sa), type 1 DM, CAD, HFrEF (EF 50% on TTE from 10/18), TIA, and PVD, who presented from HD with complaints of N/V and hyperglycemia. In the ED, patient found to be tachypneic (20), and hypotensive (as low as 70s/40s). Labs significant for leukocytopenia (WBC 3), hyperglycemia (269), elevated trops 201, with BHB of 6.3. s/p  500cc NS bolus. pt with recurrent dka    1- esrd  2- pna  3- pad  4- hyperglycemia  5- shpt   6- htn     hd am   cont with zosyn 3.375 g bid as well as zithromax   renvela 1 with meals  check pth   cont with hydralazine 100 mg tid
62 yo F with ESRD (on HD M/Tu/Th/Sa), type 1 DM, CAD, HFrEF (EF 50% on TTE from 10/18), TIA, and PVD, who presented from HD with complaints of N/V and hyperglycemia. In the ED, patient found to be tachypneic (20), and hypotensive (as low as 70s/40s). Labs significant for leukocytopenia (WBC 3), hyperglycemia (269), elevated trops 201, with BHB of 6.3. s/p  500cc NS bolus. pt with recurrent dka    1- esrd  2- pna  3- pad  4- hyperglycemia  5- shpt   6- htn               Hemodialysis Prescription:  	Access: avf  	Dialyzer: revaclear 300  	Blood Flow (mL/Min): 400  	Dialysate Flow (mL/Min): 600  	Target UF (Liters): 2.5 liter  	Treatment Time: 3.5 h  	Potassium:  2k  	Calcium: 2.5  	  YOLANDA    Vitamin D     continue with hd   see hd flow sheet
62 yo F with ESRD (on HD M/Tu/Th/Sa), type 1 DM, CAD, HFrEF (EF 50% on TTE from 10/18), TIA, and PVD, who presented from HD with complaints of N/V and hyperglycemia. In the ED, patient found to be tachypneic (20), and hypotensive (as low as 70s/40s). Labs significant for leukocytopenia (WBC 3), hyperglycemia (269), elevated trops 201, with BHB of 6.3. s/p  500cc NS bolus. pt with recurrent dka on admission now with cp and s/p coronary angio    1- esrd    hd 3. 5 hr revaclear 300 bfr 400 2 k bath 2 liter
62 yo F with ESRD (on HD M/Tu/Th/Sa), type 1 DM, CAD, HFrEF (EF 50% on TTE from 10/18), TIA, and PVD, who presented from HD with complaints of N/V and hyperglycemia. In the ED, patient found to be tachypneic (20), and hypotensive (as low as 70s/40s). Labs significant for leukocytopenia (WBC 3), hyperglycemia (269), elevated trops 201, with BHB of 6.3. s/p  500cc NS bolus. pt with recurrent dka on admission now with cp and s/p coronary angio    1- esrd  2- pna  3- pad  4- hyperglycemia  5- shpt   6- htn     hd am   insulin   renvela one tab with meals   hydralazine 100 mg q8   sensipar to cont
63 year old female w/uncontrolled T1DM multiple complications and comorbidities here w/SOB and Pneumonia complated IV abx. Now s/p cardiac cath with noted restenosis. Tolerating POs with variable and unpredictable PO intake. Eating food from outside on and off. No further hypoglycemia with BG improved while on present insulin doses. Possible discharge today if negative stool results.   Spoke to pt about the need for better heart/glycemic control at home. Pt tends to eat food rich in fat (grilled cheese) worsening heart condition. Also has erratic PO intake and possible insulin omissions/under dosing causing frequent admissions with DKA. Pt able to verbalized understanding but is not sure she can change her diet. Pt was having soup from hospital cafeteria rich in fat and salt. Pt reports taking Lantus 5 units instead of 3 at home. Instructed pt to increase basal insulin back to 5 units if she notices fasting hyperglycemia (>180s). Pt agreed with discharge plan of care.
63 year old female w/uncontrolled T1DM multiple complications and comorbidities here w/SOB and Pneumonia complated IV abx. Now s/p cardiac cath with noted restenosis. Tolerating POs with variable and unpredictable PO intake. Had hypoglycemia yesterday after cath and BG in 70s this am even after Levemir dose was decreased last night. Pt with poor PO intake>states she dislikes food and won't eat it. Will preventively decrease Levemir dose and only give premeal insulin after pt eats.  Pt becoming more and more insulin sensitive with very low insulin requirements at this time.
63 year old female w/uncontrolled T1DM multiple complications and comorbidities here w/SOB and Pneumonia on IV abx. Also being worked up for nodule of lung. Pt w/erratic PO intake, no adjustment to be made at this time. High risk for hypoglycemia and severe hyperglycemia due to dietary non-adherence. BG goal (100-200mg/dl).
63 year old female w/uncontrolled T1DM multiple complications and comorbidities here w/SOB and Pneumonia on IV abx. Pt w/labile sugars w/severe hypoglycemia this afternoon after eating breakfast. Unclear why insulin requirements lower at this time but will reduce doses. Discussed pt's hypoglycemia unawareness, she has new sensor at home but has not been started on it yet. BG goal (100-200mg/dl) given insulin sensitivity.
63 year old female w/uncontrolled T1DM multiple complications and comorbidities here w/SOB and Pneumonia on IV abx. Pt w/labile sugars. BG goal (100-200mg/dl) given insulin sensitivity.
63 year old female w/uncontrolled T1DM multiple complications and comorbidities here w/SOB and Pneumonia on IV abx. Pt w/labile sugars. BG goal (100-200mg/dl) given insulin sensitivity.
64 yo F with ESRD (on HD M/Tu/Th/Sa), type 1 DM, CAD, HFrEF (EF 50% on TTE from 10/18), TIA, and PVD, who presented from HD with complaints of N/V and hyperglycemia. In the ED, patient found to be tachypneic (20), and hypotensive (as low as 70s/40s). Labs significant for leukocytopenia (WBC 3), hyperglycemia (269), elevated trops 201, with BHB of 6.3. s/p  500cc NS bolus. pt with recurrent dka    1- esrd  2- pna  3- pad  4- hyperglycemia  5- high AGAP acidosis   6- shpt   7- htn       Hemodialysis Prescription:  	Access: avf   	Dialyzer: revaclear 300   	Blood Flow (mL/Min): 400  	Dialysate Flow (mL/Min): 600  	Target UF (Liters): 2 liter  	Treatment Time: 3.5 h  	Potassium:  2k  	Calcium: 2.5  	  YOLANDA    Vitamin D     continue with hd   see hd flow sheet
64 yo F with ESRD (on HD M/Tu/Th/Sa), type 1 DM, CAD, HFrEF (EF 50% on TTE from 10/18), TIA, and PVD, who presented from HD with complaints of N/V and hyperglycemia. In the ED, patient found to be tachypneic (20), and hypotensive (as low as 70s/40s). Labs significant for leukocytopenia (WBC 3), hyperglycemia (269), elevated trops 201, with BHB of 6.3. s/p  500cc NS bolus. pt with recurrent dka    1- esrd  2- pna  3- pad  4- hyperglycemia  5- shpt   6- htn               Hemodialysis Prescription:  	Access: avf  	Dialyzer: revaclear 300  	Blood Flow (mL/Min): 400  	Dialysate Flow (mL/Min): 600  	Target UF (Liters): 1.5-2 liter  	Treatment Time: 3.5 h  	Potassium:  2k  	Calcium: 2.5  	  YOLANDA    Vitamin D     continue with hd   see hd flow sheet
64 yo F with ESRD (on HD M/Tu/Th/Sa), type 1 DM, CAD, HFrEF (EF 50% on TTE from 10/18), TIA, and PVD, who presented from HD with complaints of N/V and hyperglycemia. In the ED, patient found to be tachypneic (20), and hypotensive (as low as 70s/40s). Labs significant for leukocytopenia (WBC 3), hyperglycemia (269), elevated trops 201, with BHB of 6.3. s/p  500cc NS bolus. pt with recurrent dka    1- esrd  2- pna  3- pad  4- hyperglycemia  5- shpt   6- htn       hd am   cont with zithromax iv with zosyn  cont sensipar. check pth and phos am  insulin regimen as above  hydralazine 100 mg q8  seen by cards. cp is reproducible as well over chest area sternal
64 yo F with ESRD (on HD M/Tu/Th/Sa), type 1 DM, CAD, HFrEF (EF 50% on TTE from 10/18), TIA, and PVD, who presented from HD with complaints of N/V and hyperglycemia. In the ED, patient found to be tachypneic (20), and hypotensive (as low as 70s/40s). Labs significant for leukocytopenia (WBC 3), hyperglycemia (269), elevated trops 201, with BHB of 6.3. s/p  500cc NS bolus. pt with recurrent dka    1- esrd  2- pna  3- pad  4- hyperglycemia  5- shpt   6- htn     check pth and phos in am   cont hydralazine   hd am   cont renvela one tab with meals
64 yo F with ESRD (on HD M/Tu/Th/Sa), type 1 DM, CAD, HFrEF (EF 50% on TTE from 10/18), TIA, and PVD, who presented from HD with complaints of N/V and hyperglycemia. In the ED, patient found to be tachypneic (20), and hypotensive (as low as 70s/40s). Labs significant for leukocytopenia (WBC 3), hyperglycemia (269), elevated trops 201, with BHB of 6.3. s/p  500cc NS bolus. pt with recurrent dka on admission now with cp and s/p coronary angio    1- esrd  2- pna  3- pad  4- hyperglycemia  5- shpt   6- htn     hd 3.5 hr revaclear 300 2k 1.5 liter bfr 400 cc/min
· Assessment		  Patient is a 62 yo F with ESRD (on HD M/Tu/Th/Sa), type 1 DM, CAD, HFrEF (EF 50% on TTE from 10/18), TIA, and PVD, recent admission here in late January for DKA with septic shock from unclear source found to have possible lung malignancy on imaging (septal thickening) and s/p MICU stay during that admission for pressor support and insulin gtt, now presenting with acute shortness of breath x 1 day associated with productive cough x 3 days noted to have likely aspiration pneumonia as well as DKA     Diabetic ketoacidosis without coma associated with type 1 diabetes mellitus.  - endocrine follow. Continue Insulin coverage.       Pneumonia of left lower lobe due to infectious organism.  - off antibiotics . observe.  - Pulmonary follow.  - speech and swallow      ESRD (end stage renal disease).  - HD  - Nephrology follow.    CAD  - cardiology follow.  - telemetry  - medical Rx  - NTG prn  - s/p cath.    DCP home vs rehab.  d/w patient and .    Pierce Viera MD pager [phone]
· Assessment		  Patient is a 62 yo F with ESRD (on HD M/Tu/Th/Sa), type 1 DM, CAD, HFrEF (EF 50% on TTE from 10/18), TIA, and PVD, recent admission here in late January for DKA with septic shock from unclear source found to have possible lung malignancy on imaging (septal thickening) and s/p MICU stay during that admission for pressor support and insulin gtt, now presenting with acute shortness of breath x 1 day associated with productive cough x 3 days noted to have likely aspiration pneumonia as well as DKA     Diabetic ketoacidosis without coma associated with type 1 diabetes mellitus.  - endocrine follow. Continue Insulin coverage.   - Treat infection.       Pneumonia of left lower lobe due to infectious organism.  - antibiotics  - Pulmonary follow.  - speech and swallow      ESRD (end stage renal disease).  - HD  - Nephrology follow.    CAD  -  cardiology follow.  - telemetry  - cardiac enzymes  - NTG prn  - cath pending     Pierce Viera MD pager [phone]
· Assessment		  Patient is a 64 yo F with ESRD (on HD M/Tu/Th/Sa), type 1 DM, CAD, HFrEF (EF 50% on TTE from 10/18), TIA, and PVD, recent admission here in late January for DKA with septic shock from unclear source found to have possible lung malignancy on imaging (septal thickening) and s/p MICU stay during that admission for pressor support and insulin gtt, now presenting with acute shortness of breath x 1 day associated with productive cough x 3 days noted to have likely aspiration pneumonia as well as DKA     Diabetic ketoacidosis without coma associated with type 1 diabetes mellitus.  - endocrine evaluation follow. Continue Insulin coverage.   - Treat infection.       Pneumonia of left lower lobe due to infectious organism.  - antibiotics  - Pulmonary follow.  - speech and swallow      ESRD (end stage renal disease).  - HD  - Nephrology follow.    CAD  -  cardiology follow..    Pierce Viera MD pager [phone]
· Assessment		  Patient is a 64 yo F with ESRD (on HD M/Tu/Th/Sa), type 1 DM, CAD, HFrEF (EF 50% on TTE from 10/18), TIA, and PVD, recent admission here in late January for DKA with septic shock from unclear source found to have possible lung malignancy on imaging (septal thickening) and s/p MICU stay during that admission for pressor support and insulin gtt, now presenting with acute shortness of breath x 1 day associated with productive cough x 3 days noted to have likely aspiration pneumonia as well as DKA     Diabetic ketoacidosis without coma associated with type 1 diabetes mellitus.  - endocrine follow. Continue Insulin coverage.       Pneumonia of left lower lobe due to infectious organism.  - off antibiotics . observe.  - Pulmonary follow.  - speech and swallow      ESRD (end stage renal disease).  - HD  - Nephrology follow.    CAD  - cardiology follow.  - telemetry  - medical Rx  - NTG prn  - s/p cath.    DCP home.    Pierce Viera MD pager [phone]
· Assessment		  Patient is a 64 yo F with ESRD (on HD M/Tu/Th/Sa), type 1 DM, CAD, HFrEF (EF 50% on TTE from 10/18), TIA, and PVD, recent admission here in late January for DKA with septic shock from unclear source found to have possible lung malignancy on imaging (septal thickening) and s/p MICU stay during that admission for pressor support and insulin gtt, now presenting with acute shortness of breath x 1 day associated with productive cough x 3 days noted to have likely aspiration pneumonia as well as DKA     Diabetic ketoacidosis without coma associated with type 1 diabetes mellitus.  - endocrine follow. Continue Insulin coverage.       Pneumonia of left lower lobe due to infectious organism.  - off antibiotics . observe.  - Pulmonary follow.  - speech and swallow      ESRD (end stage renal disease).  - HD  - Nephrology follow.    Lung cancer  - follow with CT surgery for OR at [facility]    CAD  - cardiology follow.  - telemetry  - medical Rx  - NTG prn  - s/p cath.    DC home. Follow with PMD/ Cardiology/Endocrine and CT surgery.  d/w patient QA    Pierce Viera MD pager [phone]
· Assessment		  Patient is a 64 yo F with ESRD (on HD M/Tu/Th/Sa), type 1 DM, CAD, HFrEF (EF 50% on TTE from 10/18), TIA, and PVD, recent admission here in late January for DKA with septic shock from unclear source found to have possible lung malignancy on imaging (septal thickening) and s/p MICU stay during that admission for pressor support and insulin gtt, now presenting with acute shortness of breath x 1 day associated with productive cough x 3 days noted to have likely aspiration pneumonia as well as DKA     Diabetic ketoacidosis without coma associated with type 1 diabetes mellitus.  - endocrine follow. Continue Insulin coverage.   - Treat infection.       Pneumonia of left lower lobe due to infectious organism.  - antibiotics 6/7  - Pulmonary follow.  - speech and swallow      ESRD (end stage renal disease).  - HD  - Nephrology follow.    CAD  -  cardiology follow.  - telemetry  - cardiac enzymes  - NTG prn  - cath pending with dr. Dane Viera MD pager [phone]
· Assessment		  Patient is a 64 yo F with ESRD (on HD M/Tu/Th/Sa), type 1 DM, CAD, HFrEF (EF 50% on TTE from 10/18), TIA, and PVD, recent admission here in late January for DKA with septic shock from unclear source found to have possible lung malignancy on imaging (septal thickening) and s/p MICU stay during that admission for pressor support and insulin gtt, now presenting with acute shortness of breath x 1 day associated with productive cough x 3 days noted to have likely aspiration pneumonia as well as DKA     Diabetic ketoacidosis without coma associated with type 1 diabetes mellitus.  - endocrine follow. Continue Insulin coverage.   - Treat infection.       Pneumonia of left lower lobe due to infectious organism.  - antibiotics 7/7. DC and observe.  - Pulmonary follow.  - speech and swallow      ESRD (end stage renal disease).  - HD  - Nephrology follow.    CAD  - cardiology follow.  - telemetry  - cardiac enzymes  - NTG prn  - cath pending with dr. Dane Viera MD pager [phone]
· Assessment      Patient is a 62 yo F with ESRD (on HD M/Tu/Th/Sa), type 1 DM, CAD, HFrEF (EF 50% on TTE from 10/18), TIA, and PVD, recent admission here in late January for DKA with septic shock from unclear source found to have possible lung malignancy on imaging (septal thickening) and s/p MICU stay during that admission for pressor support and insulin gtt, now presenting with acute shortness of breath x 1 day associated with productive cough x 3 days noted to have likely aspiration pneumonia as well as DKA     Diabetic ketoacidosis without coma associated with type 1 diabetes mellitus.  - endocrine evaluation follow. Continue Insulin coverage.   - Treat infection.       Pneumonia of left lower lobe due to infectious organism.  - antibiotics  - Pulmonary follow.  - speech and swallow      ESRD (end stage renal disease).  - HD  - Nephrology follow.    CAD  -  cardiology consult noted.  Further action as per clinical course   Pierce Viera MD pager [phone]
63 year old female w/uncontrolled T1DM multiple complications and comorbidities here w/SOB and Pneumonia on IV abx. Now s/p cardiac cath today with noted restenosis. Tolerating POs with variable and unpredictable PO intake. Had hypoglycemia after procedure. Will preventively decrease Levemir dose and re evaluate for further insulin adjustments. Pt becoming more and more insulin sensitive with very low insulin requirements at this time.
A/P 63y old  Female who presents with a chief complaint of DKA/Hyperkalemia/Pneumonia, on IV abx, now with c/o CP.  EKG with no changes, HsT 272 in setting of ESRD.  Received SL x 3 with relief of symptoms.  D/W Dr. Viera.     #CP  Continue current medications  Trend CE  Continue telemetry monitoring   SL NTG prn   F/U electrolytes.    Will monitor closely    SL [id number]
63 year old female w/uncontrolled T1DM multiple complications and comorbidities here w/SOB and Pneumonia on IV abx. Pt not eating consistently as she dislikes hospital food. Awaiting meal from home from  today. Discussed w/RN and patient to give premeal insulin at that time. BG goal (100-200mg/dl).

## 2019-02-21 NOTE — PROGRESS NOTE ADULT - PROBLEM SELECTOR PROBLEM 1
Type 1 diabetes mellitus with diabetic peripheral angiopathy without gangrene
Type 1 diabetes mellitus with diabetic peripheral angiopathy without gangrene
Type 1 diabetes mellitus with hypoglycemia and without coma

## 2019-02-21 NOTE — PROGRESS NOTE ADULT - SUBJECTIVE AND OBJECTIVE BOX
Cardiovascular Disease Progress Note    Overnight events: No acute events overnight.  no cp/sob/pnd/orthopnea/palps  Otherwise review of systems negative    Objective Findings:  T(C): 36.5 (19 @ 04:31), Max: 36.7 (19 @ 12:20)  HR: 71 (19 @ 04:31) (68 - 71)  BP: 137/58 (19 @ 04:31) (137/58 - 164/85)  RR: 18 (19 @ 04:31) (18 - 18)  SpO2: 96% (19 @ 04:31) (96% - 98%)  Wt(kg): --  Daily     Daily Weight in k.5 (2019 11:40)      Physical Exam:  Gen: NAD  HEENT: EOMI  CV: RRR, normal S1 + S2, no m/r/g  Lungs: CTAB  Abd: soft, non-tender  Ext: No edema    Telemetry: nsr    Laboratory Data:                        10.5   3.53  )-----------( 241      ( 2019 12:56 )             33.9     02-    133<L>  |  93<L>  |  14  ----------------------------<  199<H>  3.7   |  20<L>  |  5.01<H>    Ca    7.3<L>      2019 06:28                Inpatient Medications:  MEDICATIONS  (STANDING):  aspirin enteric coated 81 milliGRAM(s) Oral daily  atorvastatin 20 milliGRAM(s) Oral at bedtime  cinacalcet 60 milliGRAM(s) Oral daily  dextrose 5%. 1000 milliLiter(s) (50 mL/Hr) IV Continuous <Continuous>  dextrose 50% Injectable 12.5 Gram(s) IV Push once  dextrose 50% Injectable 25 Gram(s) IV Push once  dextrose 50% Injectable 25 Gram(s) IV Push once  dextrose 50% Injectable 25 milliLiter(s) IV Push once  DULoxetine 60 milliGRAM(s) Oral daily  heparin  Injectable 5000 Unit(s) SubCutaneous every 12 hours  hydrALAZINE 100 milliGRAM(s) Oral every 8 hours  insulin detemir injectable (LEVEMIR) 3 Unit(s) SubCutaneous at bedtime  insulin lispro (HumaLOG) corrective regimen sliding scale   SubCutaneous three times a day before meals  insulin lispro (HumaLOG) corrective regimen sliding scale   SubCutaneous at bedtime  insulin lispro Injectable (HumaLOG) 3 Unit(s) SubCutaneous with dinner  insulin lispro Injectable (HumaLOG) 2 Unit(s) SubCutaneous with lunch  insulin lispro Injectable (HumaLOG) 2 Unit(s) SubCutaneous with breakfast  metoprolol succinate ER 50 milliGRAM(s) Oral daily  sevelamer hydrochloride 800 milliGRAM(s) Oral three times a day with meals      Assessment:  - DKA  -lactic acidosis  -elevated cardiac enzymes, chest pain, non specific ekg changes  -CHF, volume overload. acute on chronic systolic HF  -possible PNA  -ischemic cardiomyopathy, cad s/p multiple stents  -esrd on hd  -PAD s/p prior intervention   -remote TIA      Recs:  -appreciate renal recs. s/p HD. currently appears euvolemic  -DKA resolved. f/u endo recs  -new onset atypical chest pain. ekg with nonspecific ST changes. elevated hs-trop with negative delta. s/p LHC with Dr Vela  --> severe and diffuse instent restenosis along RCA --> unable to stent due to multiple layers of stenting and prior brachytherapy. while she likes has underlying ischemia, this is only to the inferior wall of her heart and she does have collateral forming suggesting some ongoing chronicity to the restenosis. she denies any ischemic sx at present. per interventionalist dr vela, would plan to manage with aggressive medical therapy for now. i personally do not think this should preclude the patient from undergoing a potentially life saving (thoracic surgery) procedure, as the risk of untreated lung cancer would lead to significant morbidity and have a significant impact on her prognosis.   -hx of prolonged qtc. avoid qtc prolonging meds  -CT chest findings concerning for malignancy and possible left lower lobe pna. follows with dr drake and dr michael. scheduled for surgery at [facility] in 1 week. s/p abx for pna  -c/w asa, plavix, statin for ischemic cardiomyopathy/CAD/PAD if not contraindicated  -c/w toprol and hydralazine for ischemic cardiomyopathy and HTN   -dvt ppx        Over 25 minutes spent on total encounter; more than 50% of the visit was spent counseling and/or coordinating care by the attending physician.      Julián Biswas MD   Cardiovascular Disease  [phone]

## 2019-02-21 NOTE — DISCHARGE NOTE ADULT - CARE PLAN
Principal Discharge DX:	Pneumonia of left lower lobe due to infectious organism  Goal:	Likely from aspirated Pneumonia. now resolved.  Assessment and plan of treatment:	s/p antibiotic. no signs and symptoms of infection. WBC stable.  Pneumonia is a lung infection that can cause a fever, cough, and trouble breathing.  Continue all antibiotics as ordered until complete.  Nutrition is important, eat small frequent meals.  Get lots of rest and drink fluids.  Call your health care provider upon arrival home from hospital and make a follow up appointment for one week.  If your cough worsens, you develop fever greater than 101', you have shaking chills, a fast heartbeat, trouble breathing and/or feel your are breathing much faster than usual, call your healthcare provider.  Make sure you wash your hands frequently.  Secondary Diagnosis:	Diabetic ketoacidosis without coma associated with type 1 diabetes mellitus  Goal:	stable.  Assessment and plan of treatment:	Now resolved.  Diabetic ketoacidosis (DKA) is a life-threatening condition caused by dangerously high blood sugar levels. Your blood sugar levels become high because your body does not have enough insulin.  Call 911 for any of the following:  •You have a seizure.  •You begin to breathe fast, or are short of breath.  •You become weak and confused.  Seek care immediately if:  •You are more drowsy than usual.  Secondary Diagnosis:	ESRD (end stage renal disease)  Assessment and plan of treatment:	Hemodialysis is the most common method used to treat advanced and permanent kidney failure. But even with better procedures and equipment, hemodialysis is still a complicated and inconvenient therapy that requires a coordinated effort from your whole health care team, including your nephrologist, dialysis nurse, dialysis technician, dietitian, and . The most important members of your health care team are you and your family.   Healthy kidneys clean your blood by removing excess fluid, minerals, and wastes. When your kidneys fail, harmful wastes build up in your body, your blood pressure may rise, and your body may retain excess fluid and not make enough red blood cells. When this happens, you need treatment to replace the work of your failed kidneys. In hemodialysis, your blood is allowed to flow, a few ounces at a time, through a special filter that removes wastes and extra fluids. The clean blood is then returned to your body. One of the biggest adjustments you must make when you start hemodialysis treatments is following a strict schedule. Most patients go to a dialysis center three times a week for 3 to 5 or more hours each visit.   Some of the more common conditions caused by kidney failure are extreme tiredness, bone problems, joint problems, itching, and "restless legs”.  Many people treated with hemodialysis complain of itchy skin, which is often worse during or just after treatment.  Patients on dialysis often have insomnia, and some people have a specific problem called the sleep apnea syndrome.  Over time, these sleep disturbances can lead to "day-night reversal" (insomnia at night, sleepiness during the day), headache, depression, and decreased alertness.   Sleep disorders may seem unimportant, but they can impair your quality of life. Don't hesitate to raise these problems with your nurse, doctor, or   Secondary Diagnosis:	Type 1 diabetes mellitus without complication  Assessment and plan of treatment:	Type 1 diabetes develops because the immune system destroys cells in the pancreas that make insulin. The pancreas cannot make enough insulin, so the blood sugar level continues to rise.  Seek care immediately if:  •Your blood sugar level is above 240 mg/dL and does not come down with treatment.  •You have signs of high blood sugar levels, such as blurred or double vision.  •You have signs of high ketone levels, such as breath has a fruity, sweet smell, or your breathing is shallow.  •You have symptoms of a low blood sugar level, such as trouble thinking, sweating, or a pounding heartbeat.  •Your blood sugar level is lower than normal and it does not improve with treatment.  Secondary Diagnosis:	Lung mass  Assessment and plan of treatment:	for thoracic surgery next week for abnormal chest ct with  however, given findings of [facility] would need cardiac clearance prior to this.  May not be safe for anesthesia.  Secondary Diagnosis:	Chest pain, unspecified type  Assessment and plan of treatment:	s/p [facility] with Dr Vela 2/20 --> severe and diffuse in stent restenosis along RCA --> unable to stent due to multiple layers of stenting and prior brachytherapy.  HOME CARE INSTRUCTIONS  For the next few days, avoid physical activities that bring on chest pain. Continue physical activities as directed.  Do not smoke.  Avoid drinking alcohol.   Only take over-the-counter or prescription medicine for pain, discomfort, or fever as directed by your caregiver.  Follow your caregiver's suggestions for further testing if your chest pain does not go away.  Keep any follow-up appointments you made. If you do not go to an appointment, you could develop lasting (chronic) problems with pain. If there is any problem keeping an appointment, you must call to reschedule.   SEEK MEDICAL CARE IF:  You think you are having problems from the medicine you are taking. Read your medicine instructions carefully.  Your chest pain does not go away, even after treatment.  You develop a rash with blisters on your chest.  SEEK IMMEDIATE MEDICAL CARE IF:  You have increased chest pain or pain that spreads to your arm, neck, jaw, back, or abdomen.   You develop shortness of breath, an increasing cough, or you are coughing up blood.  You have severe back or abdominal pain, feel nauseous, or vomit.  You develop severe weakness, fainting, or chills.  You have a fever. Principal Discharge DX:	Pneumonia of left lower lobe due to infectious organism  Goal:	Likely from aspirated Pneumonia. now resolved.  Assessment and plan of treatment:	s/p antibiotic. no signs and symptoms of infection. WBC stable.  Pneumonia is a lung infection that can cause a fever, cough, and trouble breathing.  Continue all antibiotics as ordered until complete.  Nutrition is important, eat small frequent meals.  Get lots of rest and drink fluids.  Call your health care provider upon arrival home from hospital and make a follow up appointment for one week.  If your cough worsens, you develop fever greater than 101', you have shaking chills, a fast heartbeat, trouble breathing and/or feel your are breathing much faster than usual, call your healthcare provider.  Make sure you wash your hands frequently.  Secondary Diagnosis:	Diabetic ketoacidosis without coma associated with type 1 diabetes mellitus  Goal:	stable.  Assessment and plan of treatment:	Now resolved. continue with lantus 3untis at bedtime. humalog kwikpen 2 units before breakfast and lunch, and 3units before dinner.  Diabetic ketoacidosis (DKA) is a life-threatening condition caused by dangerously high blood sugar levels. Your blood sugar levels become high because your body does not have enough insulin.  Call 911 for any of the following:  •You have a seizure.  •You begin to breathe fast, or are short of breath.  •You become weak and confused.  Seek care immediately if:  •You are more drowsy than usual.  Secondary Diagnosis:	ESRD (end stage renal disease)  Assessment and plan of treatment:	Hemodialysis is the most common method used to treat advanced and permanent kidney failure. But even with better procedures and equipment, hemodialysis is still a complicated and inconvenient therapy that requires a coordinated effort from your whole health care team, including your nephrologist, dialysis nurse, dialysis technician, dietitian, and . The most important members of your health care team are you and your family.   Healthy kidneys clean your blood by removing excess fluid, minerals, and wastes. When your kidneys fail, harmful wastes build up in your body, your blood pressure may rise, and your body may retain excess fluid and not make enough red blood cells. When this happens, you need treatment to replace the work of your failed kidneys. In hemodialysis, your blood is allowed to flow, a few ounces at a time, through a special filter that removes wastes and extra fluids. The clean blood is then returned to your body. One of the biggest adjustments you must make when you start hemodialysis treatments is following a strict schedule. Most patients go to a dialysis center three times a week for 3 to 5 or more hours each visit.   Some of the more common conditions caused by kidney failure are extreme tiredness, bone problems, joint problems, itching, and "restless legs”.  Many people treated with hemodialysis complain of itchy skin, which is often worse during or just after treatment.  Patients on dialysis often have insomnia, and some people have a specific problem called the sleep apnea syndrome.  Over time, these sleep disturbances can lead to "day-night reversal" (insomnia at night, sleepiness during the day), headache, depression, and decreased alertness.   Sleep disorders may seem unimportant, but they can impair your quality of life. Don't hesitate to raise these problems with your nurse, doctor, or   Secondary Diagnosis:	Type 1 diabetes mellitus without complication  Assessment and plan of treatment:	Type 1 diabetes develops because the immune system destroys cells in the pancreas that make insulin. The pancreas cannot make enough insulin, so the blood sugar level continues to rise.  Seek care immediately if:  •Your blood sugar level is above 240 mg/dL and does not come down with treatment.  •You have signs of high blood sugar levels, such as blurred or double vision.  •You have signs of high ketone levels, such as breath has a fruity, sweet smell, or your breathing is shallow.  •You have symptoms of a low blood sugar level, such as trouble thinking, sweating, or a pounding heartbeat.  •Your blood sugar level is lower than normal and it does not improve with treatment.  Secondary Diagnosis:	Lung mass  Assessment and plan of treatment:	for thoracic surgery next week for abnormal chest ct with  however, given findings of [facility] would need cardiac clearance prior to this.  May not be safe for anesthesia.  Secondary Diagnosis:	Chest pain, unspecified type  Assessment and plan of treatment:	If you have chest pain, take nitroglycerin 0.4mg, if not resolved, take another nitro every 5mins for total 3 tabs maximum.   s/p [facility] with Dr Vela 2/20 --> severe and diffuse in stent restenosis along RCA --> unable to stent due to multiple layers of stenting and prior brachytherapy.  HOME CARE INSTRUCTIONS  For the next few days, avoid physical activities that bring on chest pain. Continue physical activities as directed.  Do not smoke.  Avoid drinking alcohol.   Only take over-the-counter or prescription medicine for pain, discomfort, or fever as directed by your caregiver.  Follow your caregiver's suggestions for further testing if your chest pain does not go away.  Keep any follow-up appointments you made. If you do not go to an appointment, you could develop lasting (chronic) problems with pain. If there is any problem keeping an appointment, you must call to reschedule.   SEEK MEDICAL CARE IF:  You think you are having problems from the medicine you are taking. Read your medicine instructions carefully.  Your chest pain does not go away, even after treatment.  You develop a rash with blisters on your chest.  SEEK IMMEDIATE MEDICAL CARE IF:  You have increased chest pain or pain that spreads to your arm, neck, jaw, back, or abdomen.   You develop shortness of breath, an increasing cough, or you are coughing up blood.  You have severe back or abdominal pain, feel nauseous, or vomit.  You develop severe weakness, fainting, or chills.  You have a fever.

## 2019-02-21 NOTE — DISCHARGE NOTE ADULT - HOSPITAL COURSE
Patient is a 64 yo F with ESRD (on HD M/Tu/Th/Sa), type 1 DM, CAD, HFrEF (EF 50% on TTE from 10/18), TIA, and PVD, recent admission here in late January for DKA with septic shock from unclear source found to have possible lung malignancy on imaging (septal thickening) and s/p MICU stay during that admission for pressor support and insulin gtt, now presenting with acute shortness of breath x 1 day associated with productive cough x 3 days noted to have likely aspiration pneumonia as well as DKA. s/p vanco, azithro, and zosyn, WBC stable w/ off abx, v/s stable.   Diabetic ketoacidosis without coma associated with type 1 diabetes mellitus. seen by Endo, c/w current insulin regimen. lung mass from CT chest, follow with CT surgery for OR at [facility]. c/o chest pain, s/p cardiac cath by Dr. Vela, showed severe and diffuse in stent restenosis along RCA. stable to DC home. Follow with PMD/ Cardiology/Endocrine and CT surgery. Patient is a 64 yo F with ESRD (on HD M/Tu/Th/Sa), type 1 DM, CAD, HFrEF (EF 50% on TTE from 10/18), TIA, and PVD, recent admission here in late January for DKA with septic shock from unclear source found to have possible lung malignancy on imaging (septal thickening) and s/p MICU stay during that admission for pressor support and insulin gtt, now presenting with acute shortness of breath x 1 day associated with productive cough x 3 days noted to have likely aspiration pneumonia as well as DKA. s/p vanco, azithro, and zosyn, WBC stable w/ off abx, v/s stable.   Diabetic ketoacidosis without coma associated with type 1 diabetes mellitus. seen by Endo, c/w current insulin regimen. lung mass from CT chest, follow with CT surgery for OR at [facility]. c/o chest pain, s/p cardiac cath by Dr. Vela, showed severe and diffuse in stent restenosis along RCA. stable to DC home. Follow with PMD/ Cardiology/Endocrine and CT surgery. pt c/o severe pain, istop # [id number]. on percocet 1 tab QD, prescribed extra percocet for total 5 days then her own pain med.

## 2019-02-21 NOTE — DISCHARGE NOTE ADULT - SECONDARY DIAGNOSIS.
Diabetic ketoacidosis without coma associated with type 1 diabetes mellitus ESRD (end stage renal disease) Type 1 diabetes mellitus without complication Lung mass Chest pain, unspecified type

## 2019-02-21 NOTE — DISCHARGE NOTE ADULT - MEDICATION SUMMARY - MEDICATIONS TO STOP TAKING
I will STOP taking the medications listed below when I get home from the hospital:    Insulin Pen Needles, 4mm  -- 1 application subcutaneously 4 times a day  . ** Use with insulin pen **    HumaLOG KwikPen 100 units/mL injectable solution  -- 5 unit(s) injectable 3 times a day (before meals) I will STOP taking the medications listed below when I get home from the hospital:    Insulin Pen Needles, 4mm  -- 1 application subcutaneously 4 times a day  . ** Use with insulin pen **

## 2019-02-21 NOTE — DISCHARGE NOTE ADULT - PATIENT PORTAL LINK FT
You can access the [facility], offered by [facility], by registering with the following website: [email or web address]

## 2019-02-21 NOTE — PROGRESS NOTE ADULT - SUBJECTIVE AND OBJECTIVE BOX
Patient is a 63y old  Female who presents with a chief complaint of DKA/Hyperkalemia/Pneumonia (21 Feb 2019 07:51)      SUBJECTIVE / OVERNIGHT EVENTS: Comfortable without new complaints. Wants to go home.  Review of Systems  chest pain no  palpitations no  sob no  nausea no  headache no    MEDICATIONS  (STANDING):  aspirin enteric coated 81 milliGRAM(s) Oral daily  atorvastatin 20 milliGRAM(s) Oral at bedtime  cinacalcet 60 milliGRAM(s) Oral daily  dextrose 5%. 1000 milliLiter(s) (50 mL/Hr) IV Continuous <Continuous>  dextrose 50% Injectable 12.5 Gram(s) IV Push once  dextrose 50% Injectable 25 Gram(s) IV Push once  dextrose 50% Injectable 25 Gram(s) IV Push once  dextrose 50% Injectable 25 milliLiter(s) IV Push once  DULoxetine 60 milliGRAM(s) Oral daily  heparin  Injectable 5000 Unit(s) SubCutaneous every 12 hours  hydrALAZINE 100 milliGRAM(s) Oral every 8 hours  insulin detemir injectable (LEVEMIR) 3 Unit(s) SubCutaneous at bedtime  insulin lispro (HumaLOG) corrective regimen sliding scale   SubCutaneous three times a day before meals  insulin lispro (HumaLOG) corrective regimen sliding scale   SubCutaneous at bedtime  insulin lispro Injectable (HumaLOG) 3 Unit(s) SubCutaneous with dinner  insulin lispro Injectable (HumaLOG) 2 Unit(s) SubCutaneous with lunch  insulin lispro Injectable (HumaLOG) 2 Unit(s) SubCutaneous with breakfast  metoprolol succinate ER 50 milliGRAM(s) Oral daily  sevelamer hydrochloride 800 milliGRAM(s) Oral three times a day with meals    MEDICATIONS  (PRN):  dextrose 40% Gel 15 Gram(s) Oral once PRN Blood Glucose LESS THAN 70 milliGRAM(s)/deciliter  glucagon  Injectable 1 milliGRAM(s) IntraMuscular once PRN Glucose LESS THAN 70 milligrams/deciliter  nitroglycerin     SubLingual 0.4 milliGRAM(s) SubLingual every 5 minutes PRN Chest Pain  oxyCODONE    IR 5 milliGRAM(s) Oral every 8 hours PRN Severe Pain (7 - 10)      Vital Signs Last 24 Hrs  T(C): 36.6 (21 Feb 2019 11:24), Max: 36.6 (20 Feb 2019 20:24)  T(F): 97.8 (21 Feb 2019 11:24), Max: 97.9 (20 Feb 2019 20:24)  HR: 68 (21 Feb 2019 11:24) (68 - 71)  BP: 168/77 (21 Feb 2019 11:24) (137/58 - 168/77)  BP(mean): --  RR: 18 (21 Feb 2019 11:24) (18 - 18)  SpO2: 97% (21 Feb 2019 11:24) (96% - 98%)    PHYSICAL EXAM:  GENERAL: NAD  HEAD:  Atraumatic, Normocephalic  EYES: EOMI, PERRLA, conjunctiva and sclera clear  NECK: Supple, No JVD  CHEST/LUNG: Clear to auscultation bilaterally; No wheeze  HEART: Regular rate and rhythm; No murmurs, rubs, or gallops  ABDOMEN: Soft, Nontender, Nondistended; Bowel sounds present  EXTREMITIES:  2+ Peripheral Pulses, No clubbing, cyanosis, or edema  PSYCH: AAOx3  NEUROLOGY: non-focal  SKIN: No rashes or lesions    LABS:                        11.3   5.08  )-----------( 259      ( 21 Feb 2019 08:55 )             35.8     02-21    133<L>  |  93<L>  |  14  ----------------------------<  199<H>  3.7   |  20<L>  |  5.01<H>    Ca    7.3<L>      21 Feb 2019 06:28  Phos  3.5     02-21                  RADIOLOGY & ADDITIONAL TESTS:    Imaging Personally Reviewed:    Consultant(s) Notes Reviewed:      Care Discussed with Consultants/Other Providers:

## 2019-02-21 NOTE — DISCHARGE NOTE ADULT - CARE PROVIDER_API CALL
Tee Biswas)  Cardiovascular Disease; Internal Medicine  [address] [place] [address]  Phone: [phone]  Fax: [phone]  Follow Up Time:     Pina Ley)  EndocrinologyMetabDiabetes  [address] [place] [address]  Phone: [phone]  Fax: [phone]  Follow Up Time:     Peter Thurston)  Surgery; Thoracic Surgery  [address] [place] [address], [place] [address]  Phone: [phone]  Fax: [phone]  Follow Up Time:

## 2019-02-21 NOTE — DISCHARGE NOTE ADULT - INSTRUCTIONS
cardiac diet (low fat and low sodium), renal diet (no protein restriction, no concentrate potassium, no concentrate phosphate, low sodium), diabetic diet (consistent carbohydrate)

## 2019-02-21 NOTE — DISCHARGE NOTE ADULT - MEDICATION SUMMARY - MEDICATIONS TO TAKE
I will START or STAY ON the medications listed below when I get home from the hospital:    alcohol pads  -- 4 times a day( before meals and at bedtime.)please provide 1 month supplies    -- Indication: For supply    lancet  -- 4 times a day( before meals and at bedtime.)please provide 1 month supplies  -- Indication: For supply    glucometer  -- Indication: For supply    glucose strips   -- 4 times a day( before meals and at bedtime.) please provide 1 month supplies  -- Indication: For supply    aspirin 81 mg oral delayed release tablet  -- 1 tab(s) by mouth once a day  -- Indication: For CAD    oxyCODONE 5 mg oral tablet  -- 1 tab(s) by mouth every 8 hours, As needed, Severe Pain (7 - 10) MDD:3  -- Indication: For Pain     nitroglycerin 0.4 mg sublingual tablet  -- 1 tab(s) sublingually every 5 minutes for total 3 tabs, if you have chest pain.   -- It is very important that you take or use this exactly as directed.  Do not skip doses or discontinue unless directed by your doctor.    -- Indication: For Chest pain, unspecified type and coronary artery disease (CAD)    DULoxetine 60 mg oral delayed release capsule  -- 1 cap(s) by mouth once a day  -- Indication: For Depression    insulin detemir 100 units/mL subcutaneous solution  -- 3 unit(s) subcutaneous once a day (at bedtime)  -- Indication: For Type 1 diabetes mellitus with hypoglycemia and without coma    insulin lispro  -- 2 unit(s) subcutaneous before breakfast and lunch. 3untis subcutaneous before dinner  -- Indication: For Type 1 diabetes mellitus with hypoglycemia and without coma    atorvastatin 20 mg oral tablet  -- 1 tab(s) by mouth once a day (at bedtime)  -- Indication: For CAD    Metoprolol Succinate ER 50 mg oral tablet, extended release  -- 1 tab(s) by mouth once a day  -- Indication: For CAD    Sensipar 60 mg oral tablet  -- 1 tab(s) by mouth once a day  -- Indication: For ESRD (end stage renal disease)    senna oral tablet  -- 2 tab(s) by mouth once a day (at bedtime), As Needed -for constipation   -- Indication: For Laxative    Renvela 800 mg oral tablet  -- 3 tab(s) by mouth  (with meals)  -- Indication: For Phosphate binder, ESRD.     hydrALAZINE 25 mg oral tablet  -- 4 tab(s) by mouth every 8 hours  -- Indication: For Hypertension    Multiple Vitamins oral tablet  -- 1 tab(s) by mouth once a day  -- Indication: For supplement I will START or STAY ON the medications listed below when I get home from the hospital:    alcohol pads  -- 4 times a day( before meals and at bedtime.)please provide 1 month supplies    -- Indication: For supply    lancet  -- 4 times a day( before meals and at bedtime.)please provide 1 month supplies  -- Indication: For supply    glucometer  -- Indication: For supply    glucose strips   -- 4 times a day( before meals and at bedtime.) please provide 1 month supplies  -- Indication: For supply    Percocet 5/325 oral tablet  -- 1 tab(s) by mouth every 8 hours MDD:3 tab  -- Caution federal law prohibits the transfer of this drug to any person other  than the person for whom it was prescribed.  May cause drowsiness.  Alcohol may intensify this effect.  Use care when operating dangerous machinery.  This prescription cannot be refilled.  This product contains acetaminophen.  Do not use  with any other product containing acetaminophen to prevent possible liver damage.  Using more of this medication than prescribed may cause serious breathing problems.    -- Indication: For Pain management    aspirin 81 mg oral delayed release tablet  -- 1 tab(s) by mouth once a day  -- Indication: For CAD    oxyCODONE 5 mg oral tablet  -- 1 tab(s) by mouth every 8 hours, As needed, Severe Pain (7 - 10) MDD:3  -- Indication: For Pain     nitroglycerin 0.4 mg sublingual tablet  -- 1 tab(s) sublingually every 5 minutes for total 3 tabs, if you have chest pain.   -- It is very important that you take or use this exactly as directed.  Do not skip doses or discontinue unless directed by your doctor.    -- Indication: For Chest pain, unspecified type and coronary artery disease (CAD)    DULoxetine 60 mg oral delayed release capsule  -- 1 cap(s) by mouth once a day  -- Indication: For Depression    HumaLOG KwikPen 100 units/mL injectable solution  -- 2 unit(s) injectable before breakfast and lunch and 3 units before dinner  -- Indication: For Type 1 diabetes mellitus with hypoglycemia and without coma    Lantus Solostar Pen 100 units/mL subcutaneous solution  -- 3 unit(s) subcutaneous once a day (at bedtime)  -- Indication: For Type 1 diabetes mellitus with hypoglycemia and without coma    atorvastatin 20 mg oral tablet  -- 1 tab(s) by mouth once a day (at bedtime)  -- Indication: For CAD    Metoprolol Succinate ER 50 mg oral tablet, extended release  -- 1 tab(s) by mouth once a day  -- Indication: For CAD    Sensipar 60 mg oral tablet  -- 1 tab(s) by mouth once a day  -- Indication: For ESRD (end stage renal disease)    senna oral tablet  -- 2 tab(s) by mouth once a day (at bedtime), As Needed -for constipation   -- Indication: For Laxative    Renvela 800 mg oral tablet  -- 3 tab(s) by mouth  (with meals)  -- Indication: For Phosphate binder, ESRD.     hydrALAZINE 25 mg oral tablet  -- 4 tab(s) by mouth every 8 hours  -- Indication: For Hypertension    Multiple Vitamins oral tablet  -- 1 tab(s) by mouth once a day  -- Indication: For supplement

## 2019-02-21 NOTE — DISCHARGE NOTE ADULT - CARE PROVIDERS DIRECT ADDRESSES
,DirectAddress_Unknown,DirectAddress_Unknown,[email or web address]

## 2019-02-21 NOTE — DISCHARGE NOTE ADULT - MEDICATION SUMMARY - MEDICATIONS TO CHANGE
I will SWITCH the dose or number of times a day I take the medications listed below when I get home from the hospital:    oxyCODONE-acetaminophen 5 mg-325 mg oral tablet  -- 1 tab(s) by mouth 1 to 2 times a day, As Needed for severe pain    Lantus Solostar Pen 100 units/mL subcutaneous solution  -- 10 unit(s) subcutaneous once a day (at bedtime)  . Please dispense 90-day supply.   -- Do not drink alcoholic beverages when taking this medication.  It is very important that you take or use this exactly as directed.  Do not skip doses or discontinue unless directed by your doctor.  Keep in refrigerator.  Do not freeze. I will SWITCH the dose or number of times a day I take the medications listed below when I get home from the hospital:    oxyCODONE-acetaminophen 5 mg-325 mg oral tablet  -- 1 tab(s) by mouth 1 to 2 times a day, As Needed for severe pain    Lantus Solostar Pen 100 units/mL subcutaneous solution  -- 10 unit(s) subcutaneous once a day (at bedtime)  . Please dispense 90-day supply.   -- Do not drink alcoholic beverages when taking this medication.  It is very important that you take or use this exactly as directed.  Do not skip doses or discontinue unless directed by your doctor.  Keep in refrigerator.  Do not freeze.    HumaLOG KwikPen 100 units/mL injectable solution  -- 5 unit(s) injectable 3 times a day (before meals)

## 2019-02-21 NOTE — PROGRESS NOTE ADULT - REASON FOR ADMISSION
DKA/Hyperkalemia/Pneumonia

## 2019-02-22 LAB
CULTURE RESULTS: SIGNIFICANT CHANGE UP
PTH-INTACT FLD-MCNC: 59 PG/ML — SIGNIFICANT CHANGE UP (ref 15–65)
SPECIMEN SOURCE: SIGNIFICANT CHANGE UP

## 2019-02-23 LAB
CULTURE RESULTS: SIGNIFICANT CHANGE UP
SPECIMEN SOURCE: SIGNIFICANT CHANGE UP

## 2019-02-25 LAB — CALCIUM SERPL-MCNC: 7.3 MG/DL — LOW (ref 8.4–10.5)

## 2019-02-26 ENCOUNTER — APPOINTMENT (OUTPATIENT)
Dept: THORACIC SURGERY | Facility: CLINIC | Age: 64
End: 2019-02-26
Payer: MEDICARE

## 2019-02-26 PROCEDURE — 99213 OFFICE O/P EST LOW 20 MIN: CPT

## 2019-02-27 ENCOUNTER — OUTPATIENT (OUTPATIENT)
Dept: OUTPATIENT SERVICES | Facility: HOSPITAL | Age: 64
LOS: 1 days | End: 2019-02-27

## 2019-02-27 VITALS
WEIGHT: 126.1 LBS | HEIGHT: 62 IN | OXYGEN SATURATION: 97 % | RESPIRATION RATE: 14 BRPM | DIASTOLIC BLOOD PRESSURE: 70 MMHG | SYSTOLIC BLOOD PRESSURE: 130 MMHG | TEMPERATURE: 97 F | HEART RATE: 79 BPM

## 2019-02-27 DIAGNOSIS — R59.0 LOCALIZED ENLARGED LYMPH NODES: ICD-10-CM

## 2019-02-27 DIAGNOSIS — Z98.89 OTHER SPECIFIED POSTPROCEDURAL STATES: Chronic | ICD-10-CM

## 2019-02-27 DIAGNOSIS — R06.83 SNORING: ICD-10-CM

## 2019-02-27 DIAGNOSIS — I25.10 ATHEROSCLEROTIC HEART DISEASE OF NATIVE CORONARY ARTERY WITHOUT ANGINA PECTORIS: ICD-10-CM

## 2019-02-27 DIAGNOSIS — E10.9 TYPE 1 DIABETES MELLITUS WITHOUT COMPLICATIONS: ICD-10-CM

## 2019-02-27 LAB
ALBUMIN SERPL ELPH-MCNC: 4.3 G/DL — SIGNIFICANT CHANGE UP (ref 3.3–5)
ALP SERPL-CCNC: 79 U/L — SIGNIFICANT CHANGE UP (ref 40–120)
ALT FLD-CCNC: 27 U/L — SIGNIFICANT CHANGE UP (ref 4–33)
ANION GAP SERPL CALC-SCNC: 18 MMO/L — HIGH (ref 7–14)
AST SERPL-CCNC: 40 U/L — HIGH (ref 4–32)
BILIRUB SERPL-MCNC: 0.3 MG/DL — SIGNIFICANT CHANGE UP (ref 0.2–1.2)
BLD GP AB SCN SERPL QL: NEGATIVE — SIGNIFICANT CHANGE UP
BUN SERPL-MCNC: 35 MG/DL — HIGH (ref 7–23)
CALCIUM SERPL-MCNC: 8.3 MG/DL — LOW (ref 8.4–10.5)
CHLORIDE SERPL-SCNC: 96 MMOL/L — LOW (ref 98–107)
CO2 SERPL-SCNC: 27 MMOL/L — SIGNIFICANT CHANGE UP (ref 22–31)
CREAT SERPL-MCNC: 5.59 MG/DL — HIGH (ref 0.5–1.3)
GLUCOSE SERPL-MCNC: 216 MG/DL — HIGH (ref 70–99)
HBA1C BLD-MCNC: 7.9 % — HIGH (ref 4–5.6)
HCT VFR BLD CALC: 36.8 % — SIGNIFICANT CHANGE UP (ref 34.5–45)
HGB BLD-MCNC: 10.8 G/DL — LOW (ref 11.5–15.5)
MCHC RBC-ENTMCNC: 29.3 % — LOW (ref 32–36)
MCHC RBC-ENTMCNC: 32.2 PG — SIGNIFICANT CHANGE UP (ref 27–34)
MCV RBC AUTO: 109.9 FL — HIGH (ref 80–100)
NRBC # FLD: 0 K/UL — LOW (ref 25–125)
PLATELET # BLD AUTO: 203 K/UL — SIGNIFICANT CHANGE UP (ref 150–400)
PMV BLD: 10.5 FL — SIGNIFICANT CHANGE UP (ref 7–13)
POTASSIUM SERPL-MCNC: 5 MMOL/L — SIGNIFICANT CHANGE UP (ref 3.5–5.3)
POTASSIUM SERPL-SCNC: 5 MMOL/L — SIGNIFICANT CHANGE UP (ref 3.5–5.3)
PROT SERPL-MCNC: 7.2 G/DL — SIGNIFICANT CHANGE UP (ref 6–8.3)
RBC # BLD: 3.35 M/UL — LOW (ref 3.8–5.2)
RBC # FLD: 14.8 % — HIGH (ref 10.3–14.5)
RH IG SCN BLD-IMP: POSITIVE — SIGNIFICANT CHANGE UP
SODIUM SERPL-SCNC: 141 MMOL/L — SIGNIFICANT CHANGE UP (ref 135–145)
WBC # BLD: 8.13 K/UL — SIGNIFICANT CHANGE UP (ref 3.8–10.5)
WBC # FLD AUTO: 8.13 K/UL — SIGNIFICANT CHANGE UP (ref 3.8–10.5)

## 2019-02-27 RX ORDER — CLOPIDOGREL BISULFATE 75 MG/1
1 TABLET, FILM COATED ORAL
Qty: 0 | Refills: 0 | COMMUNITY

## 2019-02-27 NOTE — H&P PST ADULT - HISTORY OF PRESENT ILLNESS
Patient is a 63 year old female with ESRD (on HD M/Tu/Th/Sa), type 1 DM, CAD, TIA, and PVD, Patient reports recent hospitalization for Pneumonia  discharged on 2/22/2019. Patient with a history of COPD, abnormal CT scan and Pulmonary nodule. Patient was referred to Dr. Thurston for an evaluation. Pre op diagnosis: Localized enlarged lymph nodes. Patient is scheduled for Flexible bronchoscopy, endobronchial ultrasound with radial probe and cytology, possible mediastinoscopy scheduled on 3/1/2019.       Patient is a poor historian.

## 2019-02-27 NOTE — H&P PST ADULT - NEGATIVE MUSCULOSKELETAL SYMPTOMS
no muscle cramps/no muscle weakness/no leg pain R/no stiffness/no neck pain/no arm pain L/no arm pain R/no back pain

## 2019-02-27 NOTE — H&P PST ADULT - GENERAL COMMENTS
" I wasn't feeling well two weeks ago, so I went to the Hospital, I had Pneumonia and problems with my diabetes"

## 2019-02-27 NOTE — H&P PST ADULT - NEGATIVE GENERAL GENITOURINARY SYMPTOMS
no hematuria/no flank pain L/no flank pain R/no dysuria/no urine discoloration/no urinary hesitancy/" I go to the bathroom very little"/normal urinary frequency

## 2019-02-27 NOTE — H&P PST ADULT - PROBLEM SELECTOR PLAN 2
History of Diabetes - Type 1- OR booking notified,   Patient instructed Lantus last dose on 2/28/2019 pm - reduce by 20% -administer 8 units of Lantus on 2/28/19-pm.     Patient instructed no Humalog on day of surgery.

## 2019-02-27 NOTE — H&P PST ADULT - NEGATIVE OPHTHALMOLOGIC SYMPTOMS
no blurred vision R/no discharge L/no pain R/no photophobia/no discharge R/no pain L/no irritation L/no irritation R/no blurred vision L/no diplopia

## 2019-02-27 NOTE — H&P PST ADULT - REASON FOR ADMISSION
" I am having a bronchoscopy" " I am having a bronchoscopy"  Pre op diagnosis: Localized enlarged lymph nodes

## 2019-02-27 NOTE — H&P PST ADULT - PRIMARY CARE PROVIDER
Dr. Castro [phone]   Dr. Ley [phone]-Endocrinologist          Dr. Jay Feliciano - Pulmonologist

## 2019-02-27 NOTE — H&P PST ADULT - NEGATIVE GASTROINTESTINAL SYMPTOMS
no diarrhea/no change in bowel habits/no abdominal pain/no nausea/no vomiting/no melena/no constipation

## 2019-02-27 NOTE — H&P PST ADULT - NEGATIVE NEUROLOGICAL SYMPTOMS
no loss of consciousness/no focal seizures/no headache/no tremors/no generalized seizures/no vertigo

## 2019-02-27 NOTE — H&P PST ADULT - PROBLEM SELECTOR PLAN 1
Patient is scheduled for Flexible bronchoscopy, endobronchial ultrasound with radial probe and cytology, possible mediastinoscopy scheduled on 3/1/2019.     Preop instructions, surgical scrub provided. Pt stated understanding.    Cardiology evaluation in chart-Dr. Tee Biswas- [phone]-Cardiologist  .   Patient instructed to take Hydralazine in the AM of surgery with a sip of water.      Patient with a hiostory of ESRD: Patient with Left AV graft- OR booking notified.

## 2019-02-27 NOTE — H&P PST ADULT - RS GEN PE MLT RESP DETAILS PC
no rales/airway patent/no rhonchi/no wheezes/clear to auscultation bilaterally/respirations non-labored/breath sounds equal/good air movement

## 2019-02-27 NOTE — H&P PST ADULT - NEGATIVE ENMT SYMPTOMS
no nasal congestion/no ear pain/no sinus symptoms/no nasal obstruction/no post-nasal discharge/no abnormal taste sensation/no gum bleeding/no nasal discharge/no dry mouth/no dysphagia/no nose bleeds/no recurrent cold sores/no tinnitus/no vertigo/no hearing difficulty

## 2019-02-27 NOTE — H&P PST ADULT - PROBLEM SELECTOR PLAN 3
Patient with a history of CAD, multiple stents placed - Patient stated that last dose of Plavix was on 2/22/2019- Patient informed me was instructed to stop by the physician from the hospital and -Dr. Tee Biswas- [phone]-Cardiologist  .    Asprin - patient to continue Asprin. Patient with a history of CAD, multiple stents placed - Patient stated that last dose of Plavix was on 2/22/2019- Patient informed me was instructed to stop by the physician from the hospital and -Dr. Tee Biswas- [phone]-Cardiologist  .  Spoke to Giselle from Dr. Biswas's office cardiologist - Ok to stop Plavix on 2/22/2019 per Dr. Doty.  Asprin - patient to continue Asprin.

## 2019-02-27 NOTE — H&P PST ADULT - PMH
ACS (acute coronary syndrome)  1/2015 - cath revealed 100% ostial stenosis not amenable to PCI - medical management  CAD (coronary artery disease)  s/p stents  CHF (congestive heart failure)  EF 40-45%  COPD (chronic obstructive pulmonary disease)    CVA (cerebral infarction)  with no residual, 8 yrs ago, prior to heart surgery - ST memory loss  Diabetes mellitus type 1  Insulin Dependent -  DKA, type 1  1/2015  ESRD (end stage renal disease)  dialysis  M, tue, thursday, saturday  Gout  past  Hyperlipidemia    Localized enlarged lymph nodes    Peripheral vascular disease  occluded left fem-pop bypass 5/2015  Subclavian vein stenosis, left  s/p stent  TIA (transient ischemic attack)  x 2 - 8-9 years ago prior to ASD/VSD repair

## 2019-03-01 ENCOUNTER — INPATIENT (INPATIENT)
Facility: HOSPITAL | Age: 64
LOS: 3 days | Discharge: HOME | End: 2019-03-05
Attending: THORACIC SURGERY (CARDIOTHORACIC VASCULAR SURGERY) | Admitting: THORACIC SURGERY (CARDIOTHORACIC VASCULAR SURGERY)
Payer: MEDICARE

## 2019-03-01 VITALS
RESPIRATION RATE: 15 BRPM | SYSTOLIC BLOOD PRESSURE: 145 MMHG | DIASTOLIC BLOOD PRESSURE: 68 MMHG | HEART RATE: 85 BPM | TEMPERATURE: 98 F | HEIGHT: 62 IN | WEIGHT: 126.1 LBS | OXYGEN SATURATION: 98 %

## 2019-03-01 DIAGNOSIS — R59.0 LOCALIZED ENLARGED LYMPH NODES: ICD-10-CM

## 2019-03-01 DIAGNOSIS — Z98.89 OTHER SPECIFIED POSTPROCEDURAL STATES: Chronic | ICD-10-CM

## 2019-03-01 PROBLEM — J44.9 CHRONIC OBSTRUCTIVE PULMONARY DISEASE, UNSPECIFIED: Chronic | Status: ACTIVE | Noted: 2019-02-27

## 2019-03-01 LAB
GAS PNL BLDV: 137 MMOL/L — SIGNIFICANT CHANGE UP (ref 136–146)
GLUCOSE BLDC GLUCOMTR-MCNC: 268 MG/DL — HIGH (ref 70–99)
GLUCOSE BLDC GLUCOMTR-MCNC: 429 MG/DL — HIGH (ref 70–99)
GLUCOSE BLDV-MCNC: 197 — HIGH (ref 70–99)
HCT VFR BLDV CALC: 33.2 % — LOW (ref 34.5–45)
POTASSIUM BLDV-SCNC: 6 MMOL/L — HIGH (ref 3.4–4.5)
POTASSIUM BLDV-SCNC: 6.2 MMOL/L — CRITICAL HIGH (ref 3.4–4.5)

## 2019-03-01 RX ORDER — METOPROLOL TARTRATE 50 MG
50 TABLET ORAL DAILY
Qty: 0 | Refills: 0 | Status: DISCONTINUED | OUTPATIENT
Start: 2019-03-01 | End: 2019-03-05

## 2019-03-01 RX ORDER — FUROSEMIDE 40 MG
40 TABLET ORAL
Qty: 0 | Refills: 0 | Status: DISCONTINUED | OUTPATIENT
Start: 2019-03-01 | End: 2019-03-05

## 2019-03-01 RX ORDER — SODIUM CHLORIDE 9 MG/ML
1000 INJECTION INTRAMUSCULAR; INTRAVENOUS; SUBCUTANEOUS
Qty: 0 | Refills: 0 | Status: DISCONTINUED | OUTPATIENT
Start: 2019-03-01 | End: 2019-03-01

## 2019-03-01 RX ORDER — CINACALCET 30 MG/1
60 TABLET, FILM COATED ORAL DAILY
Qty: 0 | Refills: 0 | Status: DISCONTINUED | OUTPATIENT
Start: 2019-03-01 | End: 2019-03-05

## 2019-03-01 RX ORDER — DOCUSATE SODIUM 100 MG
100 CAPSULE ORAL THREE TIMES A DAY
Qty: 0 | Refills: 0 | Status: DISCONTINUED | OUTPATIENT
Start: 2019-03-01 | End: 2019-03-05

## 2019-03-01 RX ORDER — HYDRALAZINE HCL 50 MG
100 TABLET ORAL EVERY 8 HOURS
Qty: 0 | Refills: 0 | Status: DISCONTINUED | OUTPATIENT
Start: 2019-03-01 | End: 2019-03-05

## 2019-03-01 RX ORDER — NITROGLYCERIN 6.5 MG
0.4 CAPSULE, EXTENDED RELEASE ORAL
Qty: 0 | Refills: 0 | Status: DISCONTINUED | OUTPATIENT
Start: 2019-03-01 | End: 2019-03-05

## 2019-03-01 RX ORDER — SENNA PLUS 8.6 MG/1
2 TABLET ORAL AT BEDTIME
Qty: 0 | Refills: 0 | Status: DISCONTINUED | OUTPATIENT
Start: 2019-03-01 | End: 2019-03-05

## 2019-03-01 RX ORDER — ASPIRIN/CALCIUM CARB/MAGNESIUM 324 MG
81 TABLET ORAL DAILY
Qty: 0 | Refills: 0 | Status: DISCONTINUED | OUTPATIENT
Start: 2019-03-01 | End: 2019-03-05

## 2019-03-01 RX ORDER — SEVELAMER CARBONATE 2400 MG/1
800 POWDER, FOR SUSPENSION ORAL
Qty: 0 | Refills: 0 | Status: DISCONTINUED | OUTPATIENT
Start: 2019-03-01 | End: 2019-03-05

## 2019-03-01 RX ORDER — INSULIN LISPRO 100/ML
VIAL (ML) SUBCUTANEOUS AT BEDTIME
Qty: 0 | Refills: 0 | Status: DISCONTINUED | OUTPATIENT
Start: 2019-03-01 | End: 2019-03-04

## 2019-03-01 RX ORDER — HEPARIN SODIUM 5000 [USP'U]/ML
5000 INJECTION INTRAVENOUS; SUBCUTANEOUS EVERY 12 HOURS
Qty: 0 | Refills: 0 | Status: DISCONTINUED | OUTPATIENT
Start: 2019-03-01 | End: 2019-03-05

## 2019-03-01 RX ORDER — DEXTROSE 50 % IN WATER 50 %
12.5 SYRINGE (ML) INTRAVENOUS ONCE
Qty: 0 | Refills: 0 | Status: DISCONTINUED | OUTPATIENT
Start: 2019-03-01 | End: 2019-03-05

## 2019-03-01 RX ORDER — DEXTROSE 50 % IN WATER 50 %
25 SYRINGE (ML) INTRAVENOUS ONCE
Qty: 0 | Refills: 0 | Status: DISCONTINUED | OUTPATIENT
Start: 2019-03-01 | End: 2019-03-05

## 2019-03-01 RX ORDER — GLUCAGON INJECTION, SOLUTION 0.5 MG/.1ML
1 INJECTION, SOLUTION SUBCUTANEOUS ONCE
Qty: 0 | Refills: 0 | Status: DISCONTINUED | OUTPATIENT
Start: 2019-03-01 | End: 2019-03-05

## 2019-03-01 RX ORDER — SODIUM CHLORIDE 9 MG/ML
1000 INJECTION, SOLUTION INTRAVENOUS
Qty: 0 | Refills: 0 | Status: DISCONTINUED | OUTPATIENT
Start: 2019-03-01 | End: 2019-03-05

## 2019-03-01 RX ORDER — INSULIN LISPRO 100/ML
VIAL (ML) SUBCUTANEOUS
Qty: 0 | Refills: 0 | Status: DISCONTINUED | OUTPATIENT
Start: 2019-03-01 | End: 2019-03-04

## 2019-03-01 RX ORDER — DEXTROSE 50 % IN WATER 50 %
15 SYRINGE (ML) INTRAVENOUS ONCE
Qty: 0 | Refills: 0 | Status: DISCONTINUED | OUTPATIENT
Start: 2019-03-01 | End: 2019-03-05

## 2019-03-01 RX ORDER — LISINOPRIL 2.5 MG/1
40 TABLET ORAL DAILY
Qty: 0 | Refills: 0 | Status: DISCONTINUED | OUTPATIENT
Start: 2019-03-01 | End: 2019-03-05

## 2019-03-01 RX ORDER — OXYCODONE HYDROCHLORIDE 5 MG/1
5 TABLET ORAL EVERY 8 HOURS
Qty: 0 | Refills: 0 | Status: DISCONTINUED | OUTPATIENT
Start: 2019-03-01 | End: 2019-03-05

## 2019-03-01 RX ORDER — DULOXETINE HYDROCHLORIDE 30 MG/1
60 CAPSULE, DELAYED RELEASE ORAL DAILY
Qty: 0 | Refills: 0 | Status: DISCONTINUED | OUTPATIENT
Start: 2019-03-01 | End: 2019-03-05

## 2019-03-01 RX ORDER — ATORVASTATIN CALCIUM 80 MG/1
20 TABLET, FILM COATED ORAL AT BEDTIME
Qty: 0 | Refills: 0 | Status: DISCONTINUED | OUTPATIENT
Start: 2019-03-01 | End: 2019-03-05

## 2019-03-01 RX ORDER — INSULIN LISPRO 100/ML
6 VIAL (ML) SUBCUTANEOUS ONCE
Qty: 0 | Refills: 0 | Status: COMPLETED | OUTPATIENT
Start: 2019-03-01 | End: 2019-03-01

## 2019-03-01 RX ORDER — INSULIN GLARGINE 100 [IU]/ML
10 INJECTION, SOLUTION SUBCUTANEOUS AT BEDTIME
Qty: 0 | Refills: 0 | Status: DISCONTINUED | OUTPATIENT
Start: 2019-03-01 | End: 2019-03-03

## 2019-03-01 RX ADMIN — OXYCODONE HYDROCHLORIDE 5 MILLIGRAM(S): 5 TABLET ORAL at 21:12

## 2019-03-01 RX ADMIN — Medication 8: at 22:12

## 2019-03-01 RX ADMIN — INSULIN GLARGINE 10 UNIT(S): 100 INJECTION, SOLUTION SUBCUTANEOUS at 22:12

## 2019-03-01 RX ADMIN — ATORVASTATIN CALCIUM 20 MILLIGRAM(S): 80 TABLET, FILM COATED ORAL at 22:03

## 2019-03-01 RX ADMIN — OXYCODONE HYDROCHLORIDE 5 MILLIGRAM(S): 5 TABLET ORAL at 21:42

## 2019-03-01 RX ADMIN — Medication 6 UNIT(S): at 14:53

## 2019-03-01 NOTE — CHART NOTE - NSCHARTNOTEFT_GEN_A_CORE
Pt was seen and examined.  Briefly this is a female with hx HTN DM PVD ESRD on HD pw hyperkalemia    Suggest  -Urgent HD now   -Surgery to be delayed        Sayed Xiao  [facility]  [phone]

## 2019-03-02 LAB
ANION GAP SERPL CALC-SCNC: 15 MMO/L — HIGH (ref 7–14)
BUN SERPL-MCNC: 22 MG/DL — SIGNIFICANT CHANGE UP (ref 7–23)
CALCIUM SERPL-MCNC: 8.8 MG/DL — SIGNIFICANT CHANGE UP (ref 8.4–10.5)
CHLORIDE SERPL-SCNC: 91 MMOL/L — LOW (ref 98–107)
CO2 SERPL-SCNC: 31 MMOL/L — SIGNIFICANT CHANGE UP (ref 22–31)
CREAT SERPL-MCNC: 3.31 MG/DL — HIGH (ref 0.5–1.3)
GLUCOSE BLDC GLUCOMTR-MCNC: 114 MG/DL — HIGH (ref 70–99)
GLUCOSE BLDC GLUCOMTR-MCNC: 145 MG/DL — HIGH (ref 70–99)
GLUCOSE BLDC GLUCOMTR-MCNC: 206 MG/DL — HIGH (ref 70–99)
GLUCOSE BLDC GLUCOMTR-MCNC: 225 MG/DL — HIGH (ref 70–99)
GLUCOSE BLDC GLUCOMTR-MCNC: 421 MG/DL — HIGH (ref 70–99)
GLUCOSE SERPL-MCNC: 307 MG/DL — HIGH (ref 70–99)
HBV CORE IGM SER-ACNC: NONREACTIVE — SIGNIFICANT CHANGE UP
HBV SURFACE AB SER-ACNC: <3 MLU/ML — LOW
HBV SURFACE AG SER-ACNC: NEGATIVE — SIGNIFICANT CHANGE UP
HCV AB S/CO SERPL IA: 0.11 S/CO — SIGNIFICANT CHANGE UP (ref 0–0.79)
HCV AB SERPL-IMP: SIGNIFICANT CHANGE UP
POTASSIUM SERPL-MCNC: 5.4 MMOL/L — HIGH (ref 3.5–5.3)
POTASSIUM SERPL-SCNC: 5.4 MMOL/L — HIGH (ref 3.5–5.3)
SODIUM SERPL-SCNC: 137 MMOL/L — SIGNIFICANT CHANGE UP (ref 135–145)

## 2019-03-02 RX ORDER — INFLUENZA VIRUS VACCINE 15; 15; 15; 15 UG/.5ML; UG/.5ML; UG/.5ML; UG/.5ML
0.5 SUSPENSION INTRAMUSCULAR ONCE
Qty: 0 | Refills: 0 | Status: DISCONTINUED | OUTPATIENT
Start: 2019-03-02 | End: 2019-03-05

## 2019-03-02 RX ADMIN — LISINOPRIL 40 MILLIGRAM(S): 2.5 TABLET ORAL at 05:18

## 2019-03-02 RX ADMIN — Medication 81 MILLIGRAM(S): at 12:32

## 2019-03-02 RX ADMIN — Medication 50 MILLIGRAM(S): at 05:18

## 2019-03-02 RX ADMIN — SEVELAMER CARBONATE 800 MILLIGRAM(S): 2400 POWDER, FOR SUSPENSION ORAL at 08:45

## 2019-03-02 RX ADMIN — OXYCODONE HYDROCHLORIDE 5 MILLIGRAM(S): 5 TABLET ORAL at 13:37

## 2019-03-02 RX ADMIN — Medication 100 MILLIGRAM(S): at 13:22

## 2019-03-02 RX ADMIN — Medication 4: at 08:36

## 2019-03-02 RX ADMIN — Medication 12: at 12:32

## 2019-03-02 RX ADMIN — Medication 100 MILLIGRAM(S): at 05:19

## 2019-03-02 RX ADMIN — SEVELAMER CARBONATE 800 MILLIGRAM(S): 2400 POWDER, FOR SUSPENSION ORAL at 12:32

## 2019-03-02 RX ADMIN — DULOXETINE HYDROCHLORIDE 60 MILLIGRAM(S): 30 CAPSULE, DELAYED RELEASE ORAL at 12:32

## 2019-03-02 RX ADMIN — Medication 100 MILLIGRAM(S): at 21:46

## 2019-03-02 RX ADMIN — ATORVASTATIN CALCIUM 20 MILLIGRAM(S): 80 TABLET, FILM COATED ORAL at 21:46

## 2019-03-02 RX ADMIN — OXYCODONE HYDROCHLORIDE 5 MILLIGRAM(S): 5 TABLET ORAL at 12:37

## 2019-03-02 RX ADMIN — SEVELAMER CARBONATE 800 MILLIGRAM(S): 2400 POWDER, FOR SUSPENSION ORAL at 17:42

## 2019-03-02 RX ADMIN — CINACALCET 60 MILLIGRAM(S): 30 TABLET, FILM COATED ORAL at 12:32

## 2019-03-02 RX ADMIN — Medication 1 TABLET(S): at 12:32

## 2019-03-02 RX ADMIN — Medication 4: at 17:43

## 2019-03-02 RX ADMIN — INSULIN GLARGINE 10 UNIT(S): 100 INJECTION, SOLUTION SUBCUTANEOUS at 21:46

## 2019-03-02 NOTE — DATA REVIEWED
[FreeTextEntry1] : CT Angio 2/11/19 revealed scattered patchy/groundglass left lower lobe opacities. Bilateral groundglass opacities in the upper lobes measuring 1 cm, not significantly changed from the prior study. Unilateral interlobular septal thickening again noted on the right, not significantly changed from the prior study. Demonstrated right paratracheal, subcarinal and hilar \par lymphadenopathy, overall not significant change from 1/23/2019. \par \par CT chest scan 1/23/19: Pulmonary arteries are enlarged consistent with pulmonary hypertension. Right paratracheal and subcarinal lymphadenopathy is noted, with the right paratracheal node stable and in the \par subcarinal node demonstrating slight interval enlargement. New small right pleural effusion with adjacent \par atelectasis. Trace left pleural effusion. New unilateral interlobular septal thickening on the right versus left. \par There is an apparent right hilar mass measuring 2.3 x 2.8 cm, demonstrating increase in size relative to previous exam. Bilateral groundglass opacities in the upper lobes measuring 0.1 cm each, \par essentially unchanged since prior. \par

## 2019-03-02 NOTE — CONSULT LETTER
[Dear  ___] : Dear  [unfilled], [Courtesy Letter:] : I had the pleasure of seeing your patient, [unfilled], in my office today. [Please see my note below.] : Please see my note below. [Sincerely,] : Sincerely, [FreeTextEntry2] : Dr. Thompson  [FreeTextEntry3] : \par Peter Thurston MD, FACS \par Chief, Division of Thoracic Surgery \par Director, Minimally Invasive Thoracic Surgery \par Department of Cardiovascular and Thoracic Surgery \par [facility] \par , Cardiovascular and Thoracic Surgery \par [facility] [employer] [facility] \par

## 2019-03-02 NOTE — CONSULT NOTE ADULT - SUBJECTIVE AND OBJECTIVE BOX
CHIEF COMPLAINT: lung surgery    HISTORY OF PRESENT ILLNESS:  64 yo F with ESRD (on HD M/Tu/Th/Sa), type 1 DM, CAD, HFrEF (EF 35% on C 2/2019), recent LHC in setting of chest pain showed severe obstructive disease in RCA that was not amenable to intervention due to multiple layers of stent,  TIA, and PVD, multiple admissions for DKA, found to have possible lung malignancy on imaging awaiting flexible bronchoscopy, endobronchial ultrasound with radial probe and cytology, possible mediastinoscopy; surgery delayed due to hyperkalemia. currently denies any cp/sob/pnd/orthopna/palpitstions            Allergies  No Known Allergies      	    MEDICATIONS:  aspirin enteric coated 81 milliGRAM(s) Oral daily  furosemide    Tablet 40 milliGRAM(s) Oral <User Schedule>  heparin  Injectable 5000 Unit(s) SubCutaneous every 12 hours  hydrALAZINE 100 milliGRAM(s) Oral every 8 hours  lisinopril 40 milliGRAM(s) Oral daily  metoprolol succinate ER 50 milliGRAM(s) Oral daily  nitroglycerin     SubLingual 0.4 milliGRAM(s) SubLingual every 5 minutes PRN  DULoxetine 60 milliGRAM(s) Oral daily  oxyCODONE    IR 5 milliGRAM(s) Oral every 8 hours PRN  docusate sodium 100 milliGRAM(s) Oral three times a day  senna 2 Tablet(s) Oral at bedtime  atorvastatin 20 milliGRAM(s) Oral at bedtime  cinacalcet 60 milliGRAM(s) Oral daily  dextrose 40% Gel 15 Gram(s) Oral once PRN  dextrose 50% Injectable 12.5 Gram(s) IV Push once  dextrose 50% Injectable 25 Gram(s) IV Push once  dextrose 50% Injectable 25 Gram(s) IV Push once  glucagon  Injectable 1 milliGRAM(s) IntraMuscular once PRN  insulin glargine Injectable (LANTUS) 10 Unit(s) SubCutaneous at bedtime  insulin lispro (HumaLOG) corrective regimen sliding scale   SubCutaneous three times a day before meals  insulin lispro (HumaLOG) corrective regimen sliding scale   SubCutaneous at bedtime  dextrose 5%. 1000 milliLiter(s) IV Continuous <Continuous>  influenza   Vaccine 0.5 milliLiter(s) IntraMuscular once  multivitamin 1 Tablet(s) Oral daily      PAST MEDICAL & SURGICAL HISTORY:  COPD (chronic obstructive pulmonary disease)  Localized enlarged lymph nodes  CHF (congestive heart failure): EF 40-45%  Subclavian vein stenosis, left: s/p stent  DKA, type 1: 1/2015  ACS (acute coronary syndrome): 1/2015 - cath revealed 100% ostial stenosis not amenable to PCI - medical management  TIA (transient ischemic attack): x 2 - 8-9 years ago prior to ASD/VSD repair  CAD (coronary artery disease): s/p stents  Gout: past  CVA (cerebral infarction): with no residual, 8 yrs ago, prior to heart surgery - ST memory loss  Peripheral vascular disease: occluded left fem-pop bypass 5/2015  Diabetes mellitus type 1: Insulin Dependent -  ESRD (end stage renal disease): dialysis  M, tue, thursday, saturday  Hyperlipidemia  Status post device closure of ASD: &quot;clamshell&quot;  History of cardiac catheterization: 1/2015 - no intervention  S/P femoral-popliteal bypass surgery: L and R in 2013 with graft; 5/2015 CFA angioplasty left and ileofemoral endarterectomywith vein patch angioplasty of left fem-pop bypass graft  Multiple vascular surgery both leg, left fempop bypass revision 11/2015  AV (arteriovenous fistula): Left AV graft; revision with stent placement 2-3 years ago  S/P cholecystectomy      FAMILY HISTORY:  Family history of smoking  Family history of hypertension  Family history of cancer (Sibling)      SOCIAL HISTORY:    former smoker. independent in adl    REVIEW OF SYSTEMS:  See HPI, otherwise complete 10 point review of systems negative    [ ] All others negative	      PHYSICAL EXAM:  T(C): 36.8 (03-02-19 @ 16:49), Max: 37.2 (03-02-19 @ 08:34)  HR: 81 (03-02-19 @ 16:49) (79 - 110)  BP: 131/65 (03-02-19 @ 16:49) (131/65 - 155/75)  RR: 18 (03-02-19 @ 16:49) (16 - 18)  SpO2: 100% (03-02-19 @ 16:49) (98% - 100%)  Wt(kg): --  I&O's Summary    01 Mar 2019 07:01  -  02 Mar 2019 07:00  --------------------------------------------------------  IN: 600 mL / OUT: 2900 mL / NET: -2300 mL        Appearance: No Acute Distress	  HEENT:  Normal oral mucosa, PERRL, EOMI	  Cardiovascular: Normal S1 S2, No JVD, No murmurs/rubs/gallops  Respiratory: Lungs clear to auscultation bilaterally  Gastrointestinal:  Soft, Non-tender, + BS	  Skin: No rashes, No ecchymoses, No cyanosis	  Neurologic: Non-focal  Extremities: No clubbing, cyanosis or edema  Vascular: Peripheral pulses palpable 2+ bilaterally  Psychiatry: A & O x 3, Mood & affect appropriate    Laboratory Data:	 	      03-02    137  |  91<L>  |  22  ----------------------------<  307<H>  5.4<H>   |  31  |  3.31<H>    Ca    8.8      02 Mar 2019 10:49    	    ECG:  	nsr nonspecific st changes  	    Assessment:  -hyperkalemia  -lung mass  -recent episode of CP s/p LHC   -volume overload  -ischemic cardiomyopathy, cad s/p multiple stents  -esrd on hd  -PAD s/p prior intervention   -remote TIA      Recs:  -appreciate renal recs. s/p urgent HD  -monitor glucose. c/w insulin regimen  -s/p LHC with Dr Vela 2/20 --> severe and diffuse instent restenosis along RCA --> unable to stent due to multiple layers of stenting and prior brachytherapy. while she likes has underlying ischemia, this is only to a small territory of her myocardium and she already has collaterals forming suggesting some ongoing chronicity to the restenosis. she denies any ischemic sx at present. per interventionalist dr vela, would plan to manage with aggressive medical therapy for now. currently she remains at moderate cardiac risk but is medically optimized, without any ischemic or HF sx, and can proceed to surgery without further cardiac work up  -hx of prolonged qtc. avoid qtc prolonging meds  -c/w asa, statin for ischemic cardiomyopathy/CAD/PAD if not contraindicated. agree with holding plavix for now  -c/w toprol and hydralazine richardson-operatively for ischemic cardiomyopathy and HTN   -dvt ppx        Greater than 60 minutes spent on total encounter; more than 50% of the visit was spent counseling and/or coordinating care by the attending physician.   	  Julián Biswas MD   Cardiovascular Diseases  [phone]

## 2019-03-02 NOTE — PHYSICAL EXAM
[Sclera] : the sclera and conjunctiva were normal [Extraocular Movements] : extraocular movements were intact [Neck Appearance] : the appearance of the neck was normal [] : no respiratory distress [Respiration, Rhythm And Depth] : normal respiratory rhythm and effort [Exaggerated Use Of Accessory Muscles For Inspiration] : no accessory muscle use [Auscultation Breath Sounds / Voice Sounds] : lungs were clear to auscultation bilaterally [Diminished Respiratory Excursion] : normal chest expansion [Involuntary Movements] : no involuntary movements were seen [Skin Color & Pigmentation] : normal skin color and pigmentation [No Focal Deficits] : no focal deficits [Oriented To Time, Place, And Person] : oriented to person, place, and time [Impaired Insight] : insight and judgment were intact [Affect] : the affect was normal [FreeTextEntry1] : uses cane

## 2019-03-02 NOTE — PROGRESS NOTE ADULT - SUBJECTIVE AND OBJECTIVE BOX
Subjective: I feel fine  No events overnight     Vital Signs:  Vital Signs Last 24 Hrs  T(C): 36.8 (03-02-19 @ 12:38), Max: 37.2 (03-02-19 @ 08:34)  T(F): 98.2 (03-02-19 @ 12:38), Max: 98.9 (03-02-19 @ 08:34)  HR: 110 (03-02-19 @ 12:38) (79 - 110)  BP: 143/77 (03-02-19 @ 12:38) (138/67 - 155/75)  RR: 18 (03-02-19 @ 12:38) (16 - 18)  SpO2: 98% (03-02-19 @ 12:38) (98% - 100%) on (O2)    Telemetry/Alarms:  General: WN/WD NAD  Neurology: Awake, nonfocal, CARR x 4  Eyes: Scleras clear, PERRLA/ EOMI, Gross vision intact  ENT:Gross hearing intact, grossly patent pharynx, no stridor  Neck: Neck supple, trachea midline, No JVD,   Respiratory: CTA B/L, No wheezing, rales, rhonchi  CV: RRR, S1S2, no murmurs, rubs or gallops  Abdominal: Soft, NT, ND +BS,   Extremities: No edema, + peripheral pulses  Skin: No Rashes, Hematoma, Ecchymosis  Lymphatic: No Neck, axilla, groin LAD  Psych: Oriented x 3, normal affect  Relevant labs, radiology and Medications reviewed    03-02    137  |  91<L>  |  22  ----------------------------<  307<H>  5.4<H>   |  31  |  3.31<H>    Ca    8.8      02 Mar 2019 10:49        MEDICATIONS  (STANDING):  aspirin enteric coated 81 milliGRAM(s) Oral daily  atorvastatin 20 milliGRAM(s) Oral at bedtime  cinacalcet 60 milliGRAM(s) Oral daily  dextrose 5%. 1000 milliLiter(s) (50 mL/Hr) IV Continuous <Continuous>  dextrose 50% Injectable 12.5 Gram(s) IV Push once  dextrose 50% Injectable 25 Gram(s) IV Push once  dextrose 50% Injectable 25 Gram(s) IV Push once  docusate sodium 100 milliGRAM(s) Oral three times a day  DULoxetine 60 milliGRAM(s) Oral daily  furosemide    Tablet 40 milliGRAM(s) Oral <User Schedule>  heparin  Injectable 5000 Unit(s) SubCutaneous every 12 hours  hydrALAZINE 100 milliGRAM(s) Oral every 8 hours  influenza   Vaccine 0.5 milliLiter(s) IntraMuscular once  insulin glargine Injectable (LANTUS) 10 Unit(s) SubCutaneous at bedtime  insulin lispro (HumaLOG) corrective regimen sliding scale   SubCutaneous three times a day before meals  insulin lispro (HumaLOG) corrective regimen sliding scale   SubCutaneous at bedtime  lisinopril 40 milliGRAM(s) Oral daily  metoprolol succinate ER 50 milliGRAM(s) Oral daily  multivitamin 1 Tablet(s) Oral daily  senna 2 Tablet(s) Oral at bedtime  sevelamer hydrochloride 800 milliGRAM(s) Oral three times a day with meals    MEDICATIONS  (PRN):  dextrose 40% Gel 15 Gram(s) Oral once PRN Blood Glucose LESS THAN 70 milliGRAM(s)/deciliter  glucagon  Injectable 1 milliGRAM(s) IntraMuscular once PRN Glucose LESS THAN 70 milligrams/deciliter  nitroglycerin     SubLingual 0.4 milliGRAM(s) SubLingual every 5 minutes PRN Chest Pain  oxyCODONE    IR 5 milliGRAM(s) Oral every 8 hours PRN Severe Pain (7 - 10)    Pertinent Physical Exam  I&O's Summary    01 Mar 2019 07:01  -  02 Mar 2019 07:00  --------------------------------------------------------  IN: 600 mL / OUT: 2900 mL / NET: -2300 mL        Assessment  63y Female  w/ PAST MEDICAL & SURGICAL HISTORY:  COPD (chronic obstructive pulmonary disease)  Localized enlarged lymph nodes  CHF (congestive heart failure): EF 40-45%  Subclavian vein stenosis, left: s/p stent  DKA, type 1: 1/2015  ACS (acute coronary syndrome): 1/2015 - cath revealed 100% ostial stenosis not amenable to PCI - medical management  TIA (transient ischemic attack): x 2 - 8-9 years ago prior to ASD/VSD repair  CAD (coronary artery disease): s/p stents  Gout: past  CVA (cerebral infarction): with no residual, 8 yrs ago, prior to heart surgery - ST memory loss  Peripheral vascular disease: occluded left fem-pop bypass 5/2015  Diabetes mellitus type 1: Insulin Dependent -  ESRD (end stage renal disease): dialysis  M, tue, thursday, saturday  Hyperlipidemia  Status post device closure of ASD: &quot;clamshell&quot;  History of cardiac catheterization: 1/2015 - no intervention  S/P femoral-popliteal bypass surgery: L and R in 2013 with graft; 5/2015 CFA angioplasty left and ileofemoral endarterectomywith vein patch angioplasty of left fem-pop bypass graft  Multiple vascular surgery both leg, left fempop bypass revision 11/2015  AV (arteriovenous fistula): Left AV graft; revision with stent placement 2-3 years ago  S/P cholecystectomy  admitted with complaints of Patient is a 63y old  Female who presents for EBUS      PLAN  Neuro: Pain management  Pulm: Encourage coughing, deep breathing and use of incentive spirometry. Wean off supplemental oxygen as able. Daily CXR.   Cardio: Monitor telemetry/alarms  GI: Tolerating diet. Continue stool softeners.  Renal: monitor urine output, Monitor K and HD per renal (Dr Hagen)  Vasc: Heparin SC/SCDs for DVT prophylaxis  Heme: Stable H/H. .   ID: Off antibiotics. Stable.  Therapy: OOB/ambulate  Tubes: Monitor Chest tube output  Disposition: Aim to D/C to home after EBUS on Monday 3/4/19  Discussed with Cardiothoracic Team at AM rounds.

## 2019-03-02 NOTE — ASSESSMENT
[FreeTextEntry1] : 63 year old female who presented in September 2018 for evaluation of pulmonary nodules at the request of her pulmonologist Dr. Thompson. She is a former smoker with a history of COPD, HTN, HLD, T1DM on insulin pump, bilateral diabetic neuropathy, PVD s/p bilat bypass/balloon, chronic LLE pain/edema, CVA (in 2003, deficit in memory recall, no neuromuscular deficit), ESRD on HD 4x week (Mon/Tues/Thus/Sat) via LUE fistula, and MI x 8 (first MI 2014, most recently 6/2018 s/p 7-stents).\par \par CT chest on 7/11/18 results as follows:\par - Few small mediastinal lymph nodes, unchanged or demonstratig interval improvement since 1/2018. \par - CHERI groundglass nodular opacity, 8 mm, unchanged since October 2, 2017 - demonstrates minimal interval increase in size since March 20, 2016.\par - CHERI groundglass nodular opacity (7mm), unchanged since March 2016.\par - CHERI nodular opacity (4mm), unchanged since May 16, 2017.\par - LLL peripheral nodular opacity (7mm) unchanged since January 12, 2018.\par - RML groundglass nodular opacity (9mm) unchanged since January 12, 2018.\par - Dominant RUL groundglass nodular opacity (1.3 cm) predominantly unchanged since January 12, 2018, although it demonstrates slight interval increase in size since March 20, 2016. \par - RLL nodular opacity (5 mm) unchanged since January 12, 2013, although it demonstrates slight interval increase in size since March 28, 2016. \par - Small focal area of irregularity involving the posterolateral aspect of the right 7th rib with a mottled appearance and internal fracture new since January 12, 2018. \par \par Last PET CT - 11/8/17:\par - Non FDG avid 1.2 cm RUL groundglass opacity - unchanged as compared to CT of 10/2/17 and increased in density since 3/28/16\par - Non FDG avid 0.6 cm bilobed density in the medial left upper lobe, increased in size since 3/28/16\par \par Pulmonary Function Tests 8/17/18:\par - FVC Post Bronchodilator: 2.51, 85%\par - FEV1 Post Bronchodilator: 1.78, 76%\par - DLCO Actual: 12.96, 61%\par \par At the time of her last visit, surgery was not indicated. The patient was hospitalized February for  aspiration PNA and DKA. CT Angio 2/11/19 revealed scattered patchy/groundglass left lower lobe opacities. Bilateral groundglass opacities in the upper lobes measuring 1 cm, not significantly changed from the prior study. Unilateral interlobular septal thickening again noted on the right, not significantly changed from the prior study. Demonstrated right paratracheal, subcarinal and hilar lymphadenopathy, overall not significant change from 1/23/2019. \par \par CT chest scan 1/23/19: Pulmonary arteries are enlarged consistent with pulmonary hypertension. Right paratracheal and subcarinal lymphadenopathy is noted, with the right paratracheal node stable and in the \par subcarinal node demonstrating slight interval enlargement. New small right pleural effusion with adjacent \par atelectasis. Trace left pleural effusion. New unilateral interlobular septal thickening on the right versus left. \par There is an apparent right hilar mass measuring 2.3 x 2.8 cm, demonstrating increase in size relative to previous exam. Bilateral groundglass opacities in the upper lobes measuring 0.1 cm each, \par essentially unchanged since prior. \par \par Spirometry 2/1/19: FVC 2.95 (53%); FEV1 2.34 (36%) \par \par PFTS 1/11/19: \par FVC post bronchodilator 1.75 (59%)\par FEV1 post 1.27 (54%)\par DLCO 11.81 \par \par I have reviewed the images with the patient and made the following recommendations. The patient is recommended to undergo a Bronchoscopy, EBUS, possible mediastinoscopy. The risks, benefits, and alternatives to the procedure were discussed with the patient at length. She verbalized understanding, and is in agreement with the above treatment plan. The patient is cardiac cleared already. She will need PST. \par \par Written by Melida Mtz NP, acting as a scribe for Peter Thurston MD.\par The documentation recorded by the scribe accurately reflects the service I personally performed and the decisions made by me. Peter Thurston MD\par \par  \par

## 2019-03-02 NOTE — HISTORY OF PRESENT ILLNESS
[FreeTextEntry1] : 63 year old female who presented in September 2018 for evaluation of pulmonary nodules at the request of her pulmonologist Dr. Thompson. She is a former smoker with a history of COPD, HTN, HLD, T1DM on insulin pump, bilateral diabetic neuropathy, PVD s/p bilat bypass/balloon, chronic LLE pain/edema, CVA (in 2003, deficit in memory recall, no neuromuscular deficit), ESRD on HD 4x week (Mon/Tues/Thus/Sat) via LUE fistula, and MI x 8 (first MI 2014, most recently 6/2018 s/p 7-stents).\par \par CT chest on 7/11/18 results as follows:\par - Few small mediastinal lymph nodes, unchanged or demonstratig interval improvement since 1/2018. \par - CHERI groundglass nodular opacity, 8 mm, unchanged since October 2, 2017 - demonstrates minimal interval increase in size since March 20, 2016.\par - CHERI groundglass nodular opacity (7mm), unchanged since March 2016.\par - CHERI nodular opacity (4mm), unchanged since May 16, 2017.\par - LLL peripheral nodular opacity (7mm) unchanged since January 12, 2018.\par - RML groundglass nodular opacity (9mm) unchanged since January 12, 2018.\par - Dominant RUL groundglass nodular opacity (1.3 cm) predominantly unchanged since January 12, 2018, although it demonstrates slight interval increase in size since March 20, 2016. \par - RLL nodular opacity (5 mm) unchanged since January 12, 2013, although it demonstrates slight interval increase in size since March 28, 2016. \par - Small focal area of irregularity involving the posterolateral aspect of the right 7th rib with a mottled appearance and internal fracture new since January 12, 2018. \par \par Last PET CT - 11/8/17:\par - Non FDG avid 1.2 cm RUL groundglass opacity - unchanged as compared to CT of 10/2/17 and increased in density since 3/28/16\par - Non FDG avid 0.6 cm bilobed density in the medial left upper lobe, increased in size since 3/28/16\par \par Pulmonary Function Tests 8/17/18:\par - FVC Post Bronchodilator: 2.51, 85%\par - FEV1 Post Bronchodilator: 1.78, 76%\par - DLCO Actual: 12.96, 61%\par \par At the time of her last visit, surgery was not indicated. The patient was hospitalized February for  aspiration PNA and DKA. CT Angio 2/11/19 revealed scattered patchy/groundglass left lower lobe opacities. Bilateral groundglass opacities in the upper lobes measuring 1 cm, not significantly changed from the prior study. Unilateral interlobular septal thickening again noted on the right, not significantly changed from the prior study. Demonstrated right paratracheal, subcarinal and hilar \par lymphadenopathy, overall not significant change from 1/23/2019. \par \par The patient presents today to discuss surgical intervention. She reports shortness of breath with exertion. The patient denies fever, chills, dysphagia or hemoptysis.

## 2019-03-03 ENCOUNTER — TRANSCRIPTION ENCOUNTER (OUTPATIENT)
Age: 64
End: 2019-03-03

## 2019-03-03 DIAGNOSIS — E78.49 OTHER HYPERLIPIDEMIA: ICD-10-CM

## 2019-03-03 DIAGNOSIS — R59.0 LOCALIZED ENLARGED LYMPH NODES: ICD-10-CM

## 2019-03-03 DIAGNOSIS — E10.649 TYPE 1 DIABETES MELLITUS WITH HYPOGLYCEMIA WITHOUT COMA: ICD-10-CM

## 2019-03-03 LAB
ANION GAP SERPL CALC-SCNC: 13 MMO/L — SIGNIFICANT CHANGE UP (ref 7–14)
BLD GP AB SCN SERPL QL: NEGATIVE — SIGNIFICANT CHANGE UP
BUN SERPL-MCNC: 36 MG/DL — HIGH (ref 7–23)
CALCIUM SERPL-MCNC: 8 MG/DL — LOW (ref 8.4–10.5)
CHLORIDE SERPL-SCNC: 95 MMOL/L — LOW (ref 98–107)
CO2 SERPL-SCNC: 28 MMOL/L — SIGNIFICANT CHANGE UP (ref 22–31)
CREAT SERPL-MCNC: 4.65 MG/DL — HIGH (ref 0.5–1.3)
CULTURE RESULTS: SIGNIFICANT CHANGE UP
GLUCOSE BLDC GLUCOMTR-MCNC: 110 MG/DL — HIGH (ref 70–99)
GLUCOSE BLDC GLUCOMTR-MCNC: 137 MG/DL — HIGH (ref 70–99)
GLUCOSE BLDC GLUCOMTR-MCNC: 194 MG/DL — HIGH (ref 70–99)
GLUCOSE BLDC GLUCOMTR-MCNC: 46 MG/DL — LOW (ref 70–99)
GLUCOSE BLDC GLUCOMTR-MCNC: 80 MG/DL — SIGNIFICANT CHANGE UP (ref 70–99)
GLUCOSE BLDC GLUCOMTR-MCNC: 96 MG/DL — SIGNIFICANT CHANGE UP (ref 70–99)
GLUCOSE SERPL-MCNC: 47 MG/DL — LOW (ref 70–99)
POTASSIUM SERPL-MCNC: 5.7 MMOL/L — HIGH (ref 3.5–5.3)
POTASSIUM SERPL-SCNC: 5.7 MMOL/L — HIGH (ref 3.5–5.3)
RH IG SCN BLD-IMP: POSITIVE — SIGNIFICANT CHANGE UP
SODIUM SERPL-SCNC: 136 MMOL/L — SIGNIFICANT CHANGE UP (ref 135–145)

## 2019-03-03 PROCEDURE — 99223 1ST HOSP IP/OBS HIGH 75: CPT

## 2019-03-03 RX ORDER — INSULIN GLARGINE 100 [IU]/ML
3 INJECTION, SOLUTION SUBCUTANEOUS AT BEDTIME
Qty: 0 | Refills: 0 | Status: DISCONTINUED | OUTPATIENT
Start: 2019-03-03 | End: 2019-03-04

## 2019-03-03 RX ORDER — INSULIN GLARGINE 100 [IU]/ML
5 INJECTION, SOLUTION SUBCUTANEOUS AT BEDTIME
Qty: 0 | Refills: 0 | Status: DISCONTINUED | OUTPATIENT
Start: 2019-03-03 | End: 2019-03-03

## 2019-03-03 RX ORDER — ONDANSETRON 8 MG/1
4 TABLET, FILM COATED ORAL ONCE
Qty: 0 | Refills: 0 | Status: COMPLETED | OUTPATIENT
Start: 2019-03-03 | End: 2019-03-03

## 2019-03-03 RX ADMIN — OXYCODONE HYDROCHLORIDE 5 MILLIGRAM(S): 5 TABLET ORAL at 05:15

## 2019-03-03 RX ADMIN — CINACALCET 60 MILLIGRAM(S): 30 TABLET, FILM COATED ORAL at 15:09

## 2019-03-03 RX ADMIN — ONDANSETRON 4 MILLIGRAM(S): 8 TABLET, FILM COATED ORAL at 21:35

## 2019-03-03 RX ADMIN — SEVELAMER CARBONATE 800 MILLIGRAM(S): 2400 POWDER, FOR SUSPENSION ORAL at 09:38

## 2019-03-03 RX ADMIN — Medication 81 MILLIGRAM(S): at 15:08

## 2019-03-03 RX ADMIN — SEVELAMER CARBONATE 800 MILLIGRAM(S): 2400 POWDER, FOR SUSPENSION ORAL at 15:09

## 2019-03-03 RX ADMIN — INSULIN GLARGINE 3 UNIT(S): 100 INJECTION, SOLUTION SUBCUTANEOUS at 21:00

## 2019-03-03 RX ADMIN — Medication 50 MILLIGRAM(S): at 05:30

## 2019-03-03 RX ADMIN — OXYCODONE HYDROCHLORIDE 5 MILLIGRAM(S): 5 TABLET ORAL at 21:28

## 2019-03-03 RX ADMIN — DULOXETINE HYDROCHLORIDE 60 MILLIGRAM(S): 30 CAPSULE, DELAYED RELEASE ORAL at 15:08

## 2019-03-03 RX ADMIN — Medication 100 MILLIGRAM(S): at 15:08

## 2019-03-03 RX ADMIN — OXYCODONE HYDROCHLORIDE 5 MILLIGRAM(S): 5 TABLET ORAL at 20:58

## 2019-03-03 RX ADMIN — LISINOPRIL 40 MILLIGRAM(S): 2.5 TABLET ORAL at 05:30

## 2019-03-03 RX ADMIN — ATORVASTATIN CALCIUM 20 MILLIGRAM(S): 80 TABLET, FILM COATED ORAL at 21:00

## 2019-03-03 RX ADMIN — OXYCODONE HYDROCHLORIDE 5 MILLIGRAM(S): 5 TABLET ORAL at 04:46

## 2019-03-03 RX ADMIN — Medication 100 MILLIGRAM(S): at 05:30

## 2019-03-03 RX ADMIN — Medication 1 TABLET(S): at 15:08

## 2019-03-03 RX ADMIN — Medication 40 MILLIGRAM(S): at 09:38

## 2019-03-03 NOTE — PROGRESS NOTE ADULT - SUBJECTIVE AND OBJECTIVE BOX
pt w/ h/o ESRD on HD 4x/wk was scheduled for EBUS on 3/1 but was cancelled due to high K+, had HD on 3/1 and is to have HD tonight, repeat K+ in am. EBUS tomorrow. Pt denies CP/SOB, N/V or abd pain    ICU Vital Signs Last 24 Hrs  T(C): 36.7 (03 Mar 2019 19:26), Max: 36.8 (02 Mar 2019 20:04)  T(F): 98.1 (03 Mar 2019 19:26), Max: 98.2 (02 Mar 2019 20:04)  HR: 77 (03 Mar 2019 19:26) (72 - 81)  BP: 130/65 (03 Mar 2019 19:26) (119/63 - 140/71)  BP(mean): --  ABP: --  ABP(mean): --  RR: 16 (03 Mar 2019 19:26) (16 - 18)  SpO2: 97% (03 Mar 2019 19:26) (97% - 100%)      MEDICATIONS  (STANDING):  aspirin enteric coated 81 milliGRAM(s) Oral daily  atorvastatin 20 milliGRAM(s) Oral at bedtime  cinacalcet 60 milliGRAM(s) Oral daily  dextrose 5%. 1000 milliLiter(s) (50 mL/Hr) IV Continuous <Continuous>  dextrose 50% Injectable 12.5 Gram(s) IV Push once  dextrose 50% Injectable 25 Gram(s) IV Push once  dextrose 50% Injectable 25 Gram(s) IV Push once  docusate sodium 100 milliGRAM(s) Oral three times a day  DULoxetine 60 milliGRAM(s) Oral daily  furosemide    Tablet 40 milliGRAM(s) Oral <User Schedule>  heparin  Injectable 5000 Unit(s) SubCutaneous every 12 hours  hydrALAZINE 100 milliGRAM(s) Oral every 8 hours  influenza   Vaccine 0.5 milliLiter(s) IntraMuscular once  insulin glargine Injectable (LANTUS) 3 Unit(s) SubCutaneous at bedtime  insulin lispro (HumaLOG) corrective regimen sliding scale   SubCutaneous three times a day before meals  insulin lispro (HumaLOG) corrective regimen sliding scale   SubCutaneous at bedtime  lisinopril 40 milliGRAM(s) Oral daily  metoprolol succinate ER 50 milliGRAM(s) Oral daily  multivitamin 1 Tablet(s) Oral daily  senna 2 Tablet(s) Oral at bedtime  sevelamer hydrochloride 800 milliGRAM(s) Oral three times a day with meals    MEDICATIONS  (PRN):  dextrose 40% Gel 15 Gram(s) Oral once PRN Blood Glucose LESS THAN 70 milliGRAM(s)/deciliter  glucagon  Injectable 1 milliGRAM(s) IntraMuscular once PRN Glucose LESS THAN 70 milligrams/deciliter  nitroglycerin     SubLingual 0.4 milliGRAM(s) SubLingual every 5 minutes PRN Chest Pain  oxyCODONE    IR 5 milliGRAM(s) Oral every 8 hours PRN Severe Pain (7 - 10)      03-03    136  |  95<L>  |  36<H>  ----------------------------<  47<L>  5.7<H>   |  28  |  4.65<H>    Ca    8.0<L>      03 Mar 2019 05:44    PE  A&Ox3  lungs clear  abd soft, tolerating diet    FS's low 40's. treated up to 80's,

## 2019-03-03 NOTE — PROGRESS NOTE ADULT - SUBJECTIVE AND OBJECTIVE BOX
Cardiovascular Disease Progress Note    Overnight events: No acute events overnight.  no cp/sob/pnd/orthopnea. awaiting procedure tomm  Otherwise review of systems negative    Objective Findings:  T(C): 36.7 (03-03-19 @ 12:36), Max: 36.8 (03-02-19 @ 16:49)  HR: 74 (03-03-19 @ 12:36) (72 - 81)  BP: 120/53 (03-03-19 @ 12:36) (119/63 - 135/67)  RR: 17 (03-03-19 @ 12:36) (17 - 18)  SpO2: 97% (03-03-19 @ 12:36) (97% - 100%)  Wt(kg): --  Daily     Daily       Physical Exam:  Gen: NAD  HEENT: EOMI  CV: RRR, normal S1 + S2, no m/r/g  Lungs: CTAB  Abd: soft, non-tender  Ext: No edema    Telemetry: nsr    Laboratory Data:    03-03    136  |  95<L>  |  36<H>  ----------------------------<  47<L>  5.7<H>   |  28  |  4.65<H>    Ca    8.0<L>      03 Mar 2019 05:44          Inpatient Medications:  MEDICATIONS  (STANDING):  aspirin enteric coated 81 milliGRAM(s) Oral daily  atorvastatin 20 milliGRAM(s) Oral at bedtime  cinacalcet 60 milliGRAM(s) Oral daily  dextrose 5%. 1000 milliLiter(s) (50 mL/Hr) IV Continuous <Continuous>  dextrose 50% Injectable 12.5 Gram(s) IV Push once  dextrose 50% Injectable 25 Gram(s) IV Push once  dextrose 50% Injectable 25 Gram(s) IV Push once  docusate sodium 100 milliGRAM(s) Oral three times a day  DULoxetine 60 milliGRAM(s) Oral daily  furosemide    Tablet 40 milliGRAM(s) Oral <User Schedule>  heparin  Injectable 5000 Unit(s) SubCutaneous every 12 hours  hydrALAZINE 100 milliGRAM(s) Oral every 8 hours  influenza   Vaccine 0.5 milliLiter(s) IntraMuscular once  insulin glargine Injectable (LANTUS) 5 Unit(s) SubCutaneous at bedtime  insulin lispro (HumaLOG) corrective regimen sliding scale   SubCutaneous three times a day before meals  insulin lispro (HumaLOG) corrective regimen sliding scale   SubCutaneous at bedtime  lisinopril 40 milliGRAM(s) Oral daily  metoprolol succinate ER 50 milliGRAM(s) Oral daily  multivitamin 1 Tablet(s) Oral daily  senna 2 Tablet(s) Oral at bedtime  sevelamer hydrochloride 800 milliGRAM(s) Oral three times a day with meals      Assessment:  -hyperkalemia  -lung mass  -recent episode of CP s/p LHC   -volume overload  -ischemic cardiomyopathy, cad s/p multiple stents  -esrd on hd  -PAD s/p prior intervention   -remote TIA      Recs:  -appreciate renal recs. s/p urgent HD friday. possible additional HD today  -monitor glucose. c/w insulin regimen  -s/p LHC with Dr Vela 2/20 --> severe and diffuse instent restenosis along RCA --> unable to stent due to multiple layers of stenting and prior brachytherapy. while she likes has underlying ischemia, this is only to a small territory of her myocardium and she already has collaterals forming suggesting some ongoing chronicity to the restenosis. she denies any ischemic sx at present. per interventionalist dr vela, would plan to manage with aggressive medical therapy for now. currently she remains at moderate cardiac risk but is medically optimized, without any ischemic or HF sx, and can proceed to surgery without further cardiac work up  -hx of prolonged qtc. avoid qtc prolonging meds  -c/w asa, statin for ischemic cardiomyopathy/CAD/PAD if not contraindicated. agree with holding plavix for now  -c/w toprol and hydralazine richardson-operatively for ischemic cardiomyopathy and HTN   -dvt ppx        Over 25 minutes spent on total encounter; more than 50% of the visit was spent counseling and/or coordinating care by the attending physician.      Julián Biswas MD   Cardiovascular Disease  [phone]

## 2019-03-03 NOTE — CONSULT NOTE ADULT - SUBJECTIVE AND OBJECTIVE BOX
HPI: 63 y.o. female with h/o uncontrolled Type 1 DM diagnosed at age 20 with multiple complications including neuropathy, retinopathy, ESRD on HD with CAD, TIA, hyperlipidemia and CHF presents for evaluation of lung lesion and scheduled for endobronchial ultrasound tomorrow. Patient states diabetes is very difficult to control and at home check FS 5 times per day with hypo and hyperglycemia. Has had DKA in the past. Is UTD with opthalmology and goes every 3 months. On HD 4 days per week. No podiatry.  At home takes Lantus 5 units QHS and Humalog 5 to 10 units before meals. Patient reports that she like to eat but in the hospital not eating much because she doesn't like the food. This morning patient was hypoglycemic to 46; however patient has awareness. Last night patient received Lantus 10 units. Currently feeling well and no complaints. No SOB or CP. No nausea or vomiting. Sees endocrinology, Dr. Ley as outpatient. Was at [facility] last month and discharged on Lantus 3 units QHS and Humalog 2/2/3 premeals.       PAST MEDICAL & SURGICAL HISTORY:  COPD (chronic obstructive pulmonary disease)  Localized enlarged lymph nodes  CHF (congestive heart failure): EF 40-45%  Subclavian vein stenosis, left: s/p stent  DKA, type 1: 1/2015  ACS (acute coronary syndrome): 1/2015 - cath revealed 100% ostial stenosis not amenable to PCI - medical management  TIA (transient ischemic attack): x 2 - 8-9 years ago prior to ASD/VSD repair  CAD (coronary artery disease): s/p stents  Gout: past  CVA (cerebral infarction): with no residual, 8 yrs ago, prior to heart surgery - ST memory loss  Peripheral vascular disease: occluded left fem-pop bypass 5/2015  Diabetes mellitus type 1: Insulin Dependent -  ESRD (end stage renal disease): dialysis  M, tue, thursday, saturday  Hyperlipidemia  Status post device closure of ASD: &quot;shantel&quot;  History of cardiac catheterization: 1/2015 - no intervention  S/P femoral-popliteal bypass surgery: L and R in 2013 with graft; 5/2015 CFA angioplasty left and ileofemoral endarterectomywith vein patch angioplasty of left fem-pop bypass graft  Multiple vascular surgery both leg, left fempop bypass revision 11/2015  AV (arteriovenous fistula): Left AV graft; revision with stent placement 2-3 years ago  S/P cholecystectomy      FAMILY HISTORY:  Family history of smoking  Family history of hypertension  Family history of cancer (Sibling)  Heart disease: Mother      Social History:   Quit smoking and no ETOH use    Outpatient Medications:  Hydralazine, Lopressor, Sensipar, Lantus, Humalog, Lipitor     MEDICATIONS  (STANDING):  aspirin enteric coated 81 milliGRAM(s) Oral daily  atorvastatin 20 milliGRAM(s) Oral at bedtime  cinacalcet 60 milliGRAM(s) Oral daily  dextrose 5%. 1000 milliLiter(s) (50 mL/Hr) IV Continuous <Continuous>  dextrose 50% Injectable 12.5 Gram(s) IV Push once  dextrose 50% Injectable 25 Gram(s) IV Push once  dextrose 50% Injectable 25 Gram(s) IV Push once  docusate sodium 100 milliGRAM(s) Oral three times a day  DULoxetine 60 milliGRAM(s) Oral daily  furosemide    Tablet 40 milliGRAM(s) Oral <User Schedule>  heparin  Injectable 5000 Unit(s) SubCutaneous every 12 hours  hydrALAZINE 100 milliGRAM(s) Oral every 8 hours  influenza   Vaccine 0.5 milliLiter(s) IntraMuscular once  insulin glargine Injectable (LANTUS) 5 Unit(s) SubCutaneous at bedtime  insulin lispro (HumaLOG) corrective regimen sliding scale   SubCutaneous three times a day before meals  insulin lispro (HumaLOG) corrective regimen sliding scale   SubCutaneous at bedtime  lisinopril 40 milliGRAM(s) Oral daily  metoprolol succinate ER 50 milliGRAM(s) Oral daily  multivitamin 1 Tablet(s) Oral daily  senna 2 Tablet(s) Oral at bedtime  sevelamer hydrochloride 800 milliGRAM(s) Oral three times a day with meals    MEDICATIONS  (PRN):  dextrose 40% Gel 15 Gram(s) Oral once PRN Blood Glucose LESS THAN 70 milliGRAM(s)/deciliter  glucagon  Injectable 1 milliGRAM(s) IntraMuscular once PRN Glucose LESS THAN 70 milligrams/deciliter  nitroglycerin     SubLingual 0.4 milliGRAM(s) SubLingual every 5 minutes PRN Chest Pain  oxyCODONE    IR 5 milliGRAM(s) Oral every 8 hours PRN Severe Pain (7 - 10)      Allergies    No Known Allergies    Intolerances      Review of Systems:  Constitutional: No fever  Eyes: No blurry vision  Neuro: No tremors  HEENT: No pain  Cardiovascular: No chest pain, palpitations  Respiratory: No SOB, no cough  GI: No nausea, vomiting, abdominal pain  : No dysuria  Skin: no rash  Psych: no depression  Endocrine: no polyuria, polydipsia  Hem/lymph: mild swelling    ALL OTHER SYSTEMS REVIEWED AND NEGATIVE      PHYSICAL EXAM:  VITALS: T(C): 36.7 (03-03-19 @ 12:36)  T(F): 98 (03-03-19 @ 12:36), Max: 98.2 (03-02-19 @ 16:49)  HR: 74 (03-03-19 @ 12:36) (72 - 81)  BP: 120/53 (03-03-19 @ 12:36) (119/63 - 135/67)  RR:  (17 - 18)  SpO2:  (97% - 100%)  Wt(kg): --  GENERAL: NAD, well-groomed, well-developed  EYES: No proptosis, no lid lag, anicteric  HEENT:  Atraumatic, Normocephalic, moist mucous membranes  THYROID: Normal size, no palpable nodules  RESPIRATORY: Clear to auscultation bilaterally; No rales, rhonchi, wheezing  CARDIOVASCULAR: + S1/S2 with mild peripheral edema  GI: Soft, nontender, non distended, normal bowel sounds  SKIN: Dry, intact, No rashes or lesions  MUSCULOSKELETAL: Full range of motion, normal strength  NEURO: extraocular movements intact  PSYCH: Alert and oriented x 3, normal affect, normal mood  CUSHING'S SIGNS: no striae    POCT Blood Glucose.: 96 mg/dL (03-03-19 @ 13:27)  POCT Blood Glucose.: 110 mg/dL (03-03-19 @ 09:34)  POCT Blood Glucose.: 80 mg/dL (03-03-19 @ 09:11)  POCT Blood Glucose.: 46 mg/dL (03-03-19 @ 08:46)  POCT Blood Glucose.: 114 mg/dL (03-02-19 @ 21:44)  POCT Blood Glucose.: 206 mg/dL (03-02-19 @ 17:41)  POCT Blood Glucose.: 421 mg/dL (03-02-19 @ 12:19)  POCT Blood Glucose.: 225 mg/dL (03-02-19 @ 08:24)  POCT Blood Glucose.: 145 mg/dL (03-02-19 @ 01:42)  POCT Blood Glucose.: 429 mg/dL (03-01-19 @ 22:09)  POCT Blood Glucose.: 268 mg/dL (03-01-19 @ 13:48)          03-03    136  |  95<L>  |  36<H>  ----------------------------<  47<L>  5.7<H>   |  28  |  4.65<H>    EGFR if : 11  EGFR if non : 9     Ca    8.0<L>      03-03        Thyroid Function Tests:      Hemoglobin A1C, Whole Blood: 7.9 % <H> [4.0 - 5.6] (02-27-19 @ 11:08)  Hemoglobin A1C, Whole Blood: 7.6 % <H> [4.0 - 5.6] (02-13-19 @ 08:42)          Radiology:

## 2019-03-03 NOTE — PROGRESS NOTE ADULT - ASSESSMENT
The patient is a radha 63-year-old female with past medical history of hypertension, diabetes, end-stage renal disease who is scheduled for a possible mediastinoscopy who has hyperkalemia.  Hyperkalemia    1.	Renal.  Will set up dialysis today.  The patient is without any electrocardiogram changes.  2.	Cardiothoracic.  Mediastinotomy and flexible sigmoidoscopy in am.     3.         Endocrine.  Continue with insulin.  Endo consult

## 2019-03-03 NOTE — CONSULT NOTE ADULT - PROBLEM SELECTOR RECOMMENDATION 9
-Patient will variable blood glucose levels and hypoglycemia unawareness  -Blood glucose target 100 to 180  -Would decrease Lantus 5 units QHS; however when NPO would decrease to 3 units  -Since po intake is minimal, will hold off on Humalog premeal doses but would continue Humalog low dose correction scale  -Will follow up with outpatient endocrinologist, Dr. Ley and dose of insulin regimen will be determine

## 2019-03-03 NOTE — PROGRESS NOTE ADULT - SUBJECTIVE AND OBJECTIVE BOX
NEPHROLOGY-[facility] [phone]        Patient seen and examined         MEDICATIONS  (STANDING):  aspirin enteric coated 81 milliGRAM(s) Oral daily  atorvastatin 20 milliGRAM(s) Oral at bedtime  cinacalcet 60 milliGRAM(s) Oral daily  dextrose 5%. 1000 milliLiter(s) (50 mL/Hr) IV Continuous <Continuous>  dextrose 50% Injectable 12.5 Gram(s) IV Push once  dextrose 50% Injectable 25 Gram(s) IV Push once  dextrose 50% Injectable 25 Gram(s) IV Push once  docusate sodium 100 milliGRAM(s) Oral three times a day  DULoxetine 60 milliGRAM(s) Oral daily  furosemide    Tablet 40 milliGRAM(s) Oral <User Schedule>  heparin  Injectable 5000 Unit(s) SubCutaneous every 12 hours  hydrALAZINE 100 milliGRAM(s) Oral every 8 hours  influenza   Vaccine 0.5 milliLiter(s) IntraMuscular once  insulin glargine Injectable (LANTUS) 5 Unit(s) SubCutaneous at bedtime  insulin lispro (HumaLOG) corrective regimen sliding scale   SubCutaneous three times a day before meals  insulin lispro (HumaLOG) corrective regimen sliding scale   SubCutaneous at bedtime  lisinopril 40 milliGRAM(s) Oral daily  metoprolol succinate ER 50 milliGRAM(s) Oral daily  multivitamin 1 Tablet(s) Oral daily  senna 2 Tablet(s) Oral at bedtime  sevelamer hydrochloride 800 milliGRAM(s) Oral three times a day with meals      VITAL:  T(C): , Max: 36.8 (03-02-19 @ 16:49)  T(F): , Max: 98.2 (03-02-19 @ 16:49)  HR: 74 (03-03-19 @ 12:36)  BP: 120/53 (03-03-19 @ 12:36)  BP(mean): --  RR: 17 (03-03-19 @ 12:36)  SpO2: 97% (03-03-19 @ 12:36)  Wt(kg): --    I and O's:        PHYSICAL EXAM:    Constitutional: NAD  Neck:  No JVD  Respiratory: CTAB/L  Cardiovascular: S1 and S2  Gastrointestinal: BS+, soft, NT/ND  Extremities: No peripheral edema  Neurological: A/O x 3, no focal deficits  Psychiatric: Normal mood, normal affect  : No Campos  Skin: No rashes  Access: Not applicable    LABS:    03-03    136  |  95<L>  |  36<H>  ----------------------------<  47<L>  5.7<H>   |  28  |  4.65<H>    Ca    8.0<L>      03 Mar 2019 05:44            Urine Studies:          RADIOLOGY & ADDITIONAL STUDIES:

## 2019-03-03 NOTE — CONSULT NOTE ADULT - ASSESSMENT
63 y.o. female with h/o uncontrolled Type 1 DM with multiple complication with hypoglycemia here for endobronchial ultrasound for evaluation of lung lesion.

## 2019-03-04 ENCOUNTER — APPOINTMENT (OUTPATIENT)
Dept: THORACIC SURGERY | Facility: HOSPITAL | Age: 64
End: 2019-03-04

## 2019-03-04 ENCOUNTER — TRANSCRIPTION ENCOUNTER (OUTPATIENT)
Age: 64
End: 2019-03-04

## 2019-03-04 ENCOUNTER — RESULT REVIEW (OUTPATIENT)
Age: 64
End: 2019-03-04

## 2019-03-04 LAB
ANION GAP SERPL CALC-SCNC: 12 MMO/L — SIGNIFICANT CHANGE UP (ref 7–14)
APTT BLD: 30.1 SEC — SIGNIFICANT CHANGE UP (ref 27.5–36.3)
BUN SERPL-MCNC: 21 MG/DL — SIGNIFICANT CHANGE UP (ref 7–23)
CALCIUM SERPL-MCNC: 7.9 MG/DL — LOW (ref 8.4–10.5)
CHLORIDE SERPL-SCNC: 97 MMOL/L — LOW (ref 98–107)
CO2 SERPL-SCNC: 28 MMOL/L — SIGNIFICANT CHANGE UP (ref 22–31)
CREAT SERPL-MCNC: 3.45 MG/DL — HIGH (ref 0.5–1.3)
GLUCOSE BLDC GLUCOMTR-MCNC: 133 MG/DL — HIGH (ref 70–99)
GLUCOSE BLDC GLUCOMTR-MCNC: 164 MG/DL — HIGH (ref 70–99)
GLUCOSE BLDC GLUCOMTR-MCNC: 191 MG/DL — HIGH (ref 70–99)
GLUCOSE BLDC GLUCOMTR-MCNC: 211 MG/DL — HIGH (ref 70–99)
GLUCOSE BLDC GLUCOMTR-MCNC: 214 MG/DL — HIGH (ref 70–99)
GLUCOSE BLDC GLUCOMTR-MCNC: 92 MG/DL — SIGNIFICANT CHANGE UP (ref 70–99)
GLUCOSE SERPL-MCNC: 80 MG/DL — SIGNIFICANT CHANGE UP (ref 70–99)
GRAM STN WND: SIGNIFICANT CHANGE UP
HCT VFR BLD CALC: 32.5 % — LOW (ref 34.5–45)
HGB BLD-MCNC: 9.9 G/DL — LOW (ref 11.5–15.5)
INR BLD: 1.06 — SIGNIFICANT CHANGE UP (ref 0.88–1.17)
MCHC RBC-ENTMCNC: 30.5 % — LOW (ref 32–36)
MCHC RBC-ENTMCNC: 32.2 PG — SIGNIFICANT CHANGE UP (ref 27–34)
MCV RBC AUTO: 105.9 FL — HIGH (ref 80–100)
NRBC # FLD: 0 K/UL — LOW (ref 25–125)
PLATELET # BLD AUTO: 248 K/UL — SIGNIFICANT CHANGE UP (ref 150–400)
PMV BLD: 9.9 FL — SIGNIFICANT CHANGE UP (ref 7–13)
POTASSIUM SERPL-MCNC: 4.5 MMOL/L — SIGNIFICANT CHANGE UP (ref 3.5–5.3)
POTASSIUM SERPL-SCNC: 4.5 MMOL/L — SIGNIFICANT CHANGE UP (ref 3.5–5.3)
PROTHROM AB SERPL-ACNC: 12.1 SEC — SIGNIFICANT CHANGE UP (ref 9.8–13.1)
RBC # BLD: 3.07 M/UL — LOW (ref 3.8–5.2)
RBC # FLD: 14.9 % — HIGH (ref 10.3–14.5)
SODIUM SERPL-SCNC: 137 MMOL/L — SIGNIFICANT CHANGE UP (ref 135–145)
SPECIMEN SOURCE: SIGNIFICANT CHANGE UP
WBC # BLD: 4.62 K/UL — SIGNIFICANT CHANGE UP (ref 3.8–10.5)
WBC # FLD AUTO: 4.62 K/UL — SIGNIFICANT CHANGE UP (ref 3.8–10.5)

## 2019-03-04 PROCEDURE — 39402 MEDIASTINOSCPY W/LMPH NOD BX: CPT

## 2019-03-04 PROCEDURE — 88305 TISSUE EXAM BY PATHOLOGIST: CPT | Mod: 26,59

## 2019-03-04 PROCEDURE — 88189 FLOWCYTOMETRY/READ 16 & >: CPT

## 2019-03-04 PROCEDURE — 88305 TISSUE EXAM BY PATHOLOGIST: CPT | Mod: 26

## 2019-03-04 PROCEDURE — 88173 CYTOPATH EVAL FNA REPORT: CPT | Mod: 26

## 2019-03-04 PROCEDURE — 31629 BRONCHOSCOPY/NEEDLE BX EACH: CPT

## 2019-03-04 PROCEDURE — 88331 PATH CONSLTJ SURG 1 BLK 1SPC: CPT | Mod: 26

## 2019-03-04 PROCEDURE — 88172 CYTP DX EVAL FNA 1ST EA SITE: CPT | Mod: 26

## 2019-03-04 PROCEDURE — 71045 X-RAY EXAM CHEST 1 VIEW: CPT | Mod: 26

## 2019-03-04 RX ORDER — FENTANYL CITRATE 50 UG/ML
50 INJECTION INTRAVENOUS
Qty: 0 | Refills: 0 | Status: DISCONTINUED | OUTPATIENT
Start: 2019-03-04 | End: 2019-03-04

## 2019-03-04 RX ORDER — INSULIN GLARGINE 100 [IU]/ML
5 INJECTION, SOLUTION SUBCUTANEOUS AT BEDTIME
Qty: 0 | Refills: 0 | Status: DISCONTINUED | OUTPATIENT
Start: 2019-03-04 | End: 2019-03-05

## 2019-03-04 RX ORDER — DEXAMETHASONE 0.5 MG/5ML
4 ELIXIR ORAL ONCE
Qty: 0 | Refills: 0 | Status: COMPLETED | OUTPATIENT
Start: 2019-03-04 | End: 2019-03-04

## 2019-03-04 RX ORDER — FENTANYL CITRATE 50 UG/ML
25 INJECTION INTRAVENOUS
Qty: 0 | Refills: 0 | Status: DISCONTINUED | OUTPATIENT
Start: 2019-03-04 | End: 2019-03-04

## 2019-03-04 RX ORDER — ONDANSETRON 8 MG/1
4 TABLET, FILM COATED ORAL ONCE
Qty: 0 | Refills: 0 | Status: COMPLETED | OUTPATIENT
Start: 2019-03-04 | End: 2019-03-04

## 2019-03-04 RX ORDER — INSULIN LISPRO 100/ML
VIAL (ML) SUBCUTANEOUS EVERY 6 HOURS
Qty: 0 | Refills: 0 | Status: DISCONTINUED | OUTPATIENT
Start: 2019-03-04 | End: 2019-03-05

## 2019-03-04 RX ADMIN — ONDANSETRON 4 MILLIGRAM(S): 8 TABLET, FILM COATED ORAL at 16:11

## 2019-03-04 RX ADMIN — Medication 40 MILLIGRAM(S): at 08:32

## 2019-03-04 RX ADMIN — OXYCODONE HYDROCHLORIDE 5 MILLIGRAM(S): 5 TABLET ORAL at 22:54

## 2019-03-04 RX ADMIN — LISINOPRIL 40 MILLIGRAM(S): 2.5 TABLET ORAL at 05:06

## 2019-03-04 RX ADMIN — Medication 4 MILLIGRAM(S): at 17:40

## 2019-03-04 RX ADMIN — ATORVASTATIN CALCIUM 20 MILLIGRAM(S): 80 TABLET, FILM COATED ORAL at 22:20

## 2019-03-04 RX ADMIN — Medication 50 MILLIGRAM(S): at 05:06

## 2019-03-04 RX ADMIN — Medication 100 MILLIGRAM(S): at 05:06

## 2019-03-04 RX ADMIN — OXYCODONE HYDROCHLORIDE 5 MILLIGRAM(S): 5 TABLET ORAL at 09:17

## 2019-03-04 RX ADMIN — INSULIN GLARGINE 5 UNIT(S): 100 INJECTION, SOLUTION SUBCUTANEOUS at 22:14

## 2019-03-04 RX ADMIN — OXYCODONE HYDROCHLORIDE 5 MILLIGRAM(S): 5 TABLET ORAL at 10:10

## 2019-03-04 RX ADMIN — OXYCODONE HYDROCHLORIDE 5 MILLIGRAM(S): 5 TABLET ORAL at 22:24

## 2019-03-04 RX ADMIN — Medication 100 MILLIGRAM(S): at 22:14

## 2019-03-04 RX ADMIN — Medication 2: at 17:40

## 2019-03-04 NOTE — BRIEF OPERATIVE NOTE - PROCEDURE
<<-----Click on this checkbox to enter Procedure Mediastinoscopy by cervical approach  03/04/2019    Active  [facility]  Lymph node biopsy  03/04/2019    Active  [facility]  Endobronchial ultrasound (EBUS)  03/04/2019    Active  [facility]  Flexible bronchoscopy by cardiothoracic surgery  03/04/2019    Active  [facility]

## 2019-03-04 NOTE — DISCHARGE NOTE ADULT - NS AS ACTIVITY OBS
Stairs allowed/Sex allowed/No Heavy lifting/straining/Do not make important decisions/Do not drive or operate machinery/Showering allowed/Walking-Indoors allowed/Walking-Outdoors allowed

## 2019-03-04 NOTE — DISCHARGE NOTE ADULT - PLAN OF CARE
Final pathology Walk frequently. You may shower daily. You can climb stairs.   Can keep wounds uncovered starting tomorrow.   See Dr. Thurston in 2 weeks. Call for an apt. [phone] Final pathology. s/p EBUS, and mediastinoscopy. Outpatient follow up. Walk frequently. You may shower daily. You can climb stairs.   Can keep wounds uncovered starting tomorrow 3/6/19.    See Dr. Thurston in 2 weeks. Call for an apt. [phone] Insulin as stated above. Please follow up with your endocrinologist Dr. Ley in 1-2 weeks. Resume outpatient dialysis as previously scheduled. You may resume plavix 3/6/19 for your stents. Resume outpatient dialysis as previously scheduled 4x/week. You may resume plavix today for your stents. Continue your premeal insulin as previously taking, only use Lantus 5mg at bedtime.   Please follow up with your endocrinologist Dr. Ley in 1 week.

## 2019-03-04 NOTE — DISCHARGE NOTE ADULT - MEDICATION SUMMARY - MEDICATIONS TO CHANGE
I will SWITCH the dose or number of times a day I take the medications listed below when I get home from the hospital:    Lantus Solostar Pen 100 units/mL subcutaneous solution  -- 10 unit(s) subcutaneous once a day (at bedtime)

## 2019-03-04 NOTE — DISCHARGE NOTE ADULT - PATIENT PORTAL LINK FT
You can access the [email or web address] Portal, offered by [facility], by registering with the following website: [email or web address]

## 2019-03-04 NOTE — PROGRESS NOTE ADULT - SUBJECTIVE AND OBJECTIVE BOX
Cardiovascular Disease Progress Note    Overnight events: No acute events overnight.  plan for EBUS and bx today. s/p HD last night. denies any cp/sob/palps/orthopnea  Otherwise review of systems negative    Objective Findings:  T(C): 36.6 (19 @ 04:59), Max: 36.8 (19 @ 21:50)  HR: 70 (19 @ 04:59) (70 - 81)  BP: 127/64 (19 @ 04:59) (119/63 - 140/71)  RR: 16 (19 @ 04:59) (16 - 20)  SpO2: 97% (19 @ 04:59) (97% - 98%)  Wt(kg): --  Daily     Daily Weight in k.6 (04 Mar 2019 04:59)      Physical Exam:  Gen: NAD  HEENT: EOMI  CV: RRR, normal S1 + S2, no m/r/g  Lungs: CTAB  Abd: soft, non-tender  Ext: trace edema    Telemetry: nsr    Laboratory Data:                        9.9    4.62  )-----------( 248      ( 04 Mar 2019 05:29 )             32.5     03-04    137  |  97<L>  |  21  ----------------------------<  80  4.5   |  28  |  3.45<H>    Ca    7.9<L>      04 Mar 2019 05:29      PT/INR - ( 04 Mar 2019 05:29 )   PT: 12.1 SEC;   INR: 1.06          PTT - ( 04 Mar 2019 05:29 )  PTT:30.1 SEC          Inpatient Medications:  MEDICATIONS  (STANDING):  aspirin enteric coated 81 milliGRAM(s) Oral daily  atorvastatin 20 milliGRAM(s) Oral at bedtime  cinacalcet 60 milliGRAM(s) Oral daily  dextrose 5%. 1000 milliLiter(s) (50 mL/Hr) IV Continuous <Continuous>  dextrose 50% Injectable 12.5 Gram(s) IV Push once  dextrose 50% Injectable 25 Gram(s) IV Push once  dextrose 50% Injectable 25 Gram(s) IV Push once  docusate sodium 100 milliGRAM(s) Oral three times a day  DULoxetine 60 milliGRAM(s) Oral daily  furosemide    Tablet 40 milliGRAM(s) Oral <User Schedule>  heparin  Injectable 5000 Unit(s) SubCutaneous every 12 hours  hydrALAZINE 100 milliGRAM(s) Oral every 8 hours  influenza   Vaccine 0.5 milliLiter(s) IntraMuscular once  insulin glargine Injectable (LANTUS) 3 Unit(s) SubCutaneous at bedtime  insulin lispro (HumaLOG) corrective regimen sliding scale   SubCutaneous every 6 hours  lisinopril 40 milliGRAM(s) Oral daily  metoprolol succinate ER 50 milliGRAM(s) Oral daily  multivitamin 1 Tablet(s) Oral daily  senna 2 Tablet(s) Oral at bedtime  sevelamer hydrochloride 800 milliGRAM(s) Oral three times a day with meals      Assessment:  -hyperkalemia  -lung mass  -recent episode of CP s/p LHC   -volume overload  -ischemic cardiomyopathy, cad s/p multiple stents  -esrd on hd  -PAD s/p prior intervention   -remote TIA      Recs:  -appreciate renal recs. s/p HD 3/3. appears euvolemic. BMP wnl   -monitor glucose. c/w insulin regimen  -s/p LHC with Dr Vela  --> severe and diffuse instent restenosis along RCA --> unable to stent due to multiple layers of stenting and prior brachytherapy. while she likes has underlying ischemia, this is only to a small territory of her myocardium and she already has collaterals forming suggesting some ongoing chronicity to the restenosis. she denies any ischemic sx at present. per interventionalist dr vela, would plan to manage with aggressive medical therapy for now. currently she remains at moderate cardiac risk but is medically optimized, without any ischemic or HF sx, and can proceed to surgery without further cardiac work up  -hx of prolonged qtc. avoid qtc prolonging meds  -c/w asa, statin for ischemic cardiomyopathy/CAD/PAD if not contraindicated. agree with holding plavix for now  -c/w toprol and hydralazine richardson-operatively for ischemic cardiomyopathy and HTN   -dvt ppx      Over 25 minutes spent on total encounter; more than 50% of the visit was spent counseling and/or coordinating care by the attending physician.      Julián Biswas MD   Cardiovascular Disease  [phone]

## 2019-03-04 NOTE — PROGRESS NOTE ADULT - SUBJECTIVE AND OBJECTIVE BOX
Chief Complaint/Follow-up on:   DM    Subjective:  POC blood glucose and insulin use reviewed.  NPO today for OR  glucose 92       MEDICATIONS  (STANDING):  aspirin enteric coated 81 milliGRAM(s) Oral daily  atorvastatin 20 milliGRAM(s) Oral at bedtime  cinacalcet 60 milliGRAM(s) Oral daily  dextrose 5%. 1000 milliLiter(s) (50 mL/Hr) IV Continuous <Continuous>  dextrose 50% Injectable 12.5 Gram(s) IV Push once  dextrose 50% Injectable 25 Gram(s) IV Push once  dextrose 50% Injectable 25 Gram(s) IV Push once  docusate sodium 100 milliGRAM(s) Oral three times a day  DULoxetine 60 milliGRAM(s) Oral daily  furosemide    Tablet 40 milliGRAM(s) Oral <User Schedule>  heparin  Injectable 5000 Unit(s) SubCutaneous every 12 hours  hydrALAZINE 100 milliGRAM(s) Oral every 8 hours  influenza   Vaccine 0.5 milliLiter(s) IntraMuscular once  insulin glargine Injectable (LANTUS) 3 Unit(s) SubCutaneous at bedtime  insulin lispro (HumaLOG) corrective regimen sliding scale   SubCutaneous every 6 hours  lisinopril 40 milliGRAM(s) Oral daily  metoprolol succinate ER 50 milliGRAM(s) Oral daily  multivitamin 1 Tablet(s) Oral daily  senna 2 Tablet(s) Oral at bedtime  sevelamer hydrochloride 800 milliGRAM(s) Oral three times a day with meals    MEDICATIONS  (PRN):  dextrose 40% Gel 15 Gram(s) Oral once PRN Blood Glucose LESS THAN 70 milliGRAM(s)/deciliter  glucagon  Injectable 1 milliGRAM(s) IntraMuscular once PRN Glucose LESS THAN 70 milligrams/deciliter  nitroglycerin     SubLingual 0.4 milliGRAM(s) SubLingual every 5 minutes PRN Chest Pain  oxyCODONE    IR 5 milliGRAM(s) Oral every 8 hours PRN Severe Pain (7 - 10)      PHYSICAL EXAM:  VITALS: T(C): 36.7 (03-04-19 @ 08:29)  T(F): 98 (03-04-19 @ 08:29), Max: 98.3 (03-03-19 @ 21:50)  HR: 73 (03-04-19 @ 08:29) (70 - 81)  BP: 121/62 (03-04-19 @ 08:29) (120/53 - 140/71)  RR:  (16 - 20)  SpO2:  (92% - 98%)  Wt(kg): --  GENERAL: NAD, well-groomed, well-developed  EYES: No proptosis, no injection  HEENT:  Atraumatic, Normocephalic, moist mucous membranes  RESPIRATORY: Clear to auscultation bilaterally; No rales, rhonchi, wheezing, or rubs  CARDIOVASCULAR: Regular rate and rhythm; No murmurs; no peripheral edema  GI: Soft, nontender, non distended, normal bowel sounds      POCT Blood Glucose.: 92 mg/dL (03-04-19 @ 05:04)  POCT Blood Glucose.: 194 mg/dL (03-03-19 @ 20:56)  POCT Blood Glucose.: 137 mg/dL (03-03-19 @ 17:49)  POCT Blood Glucose.: 96 mg/dL (03-03-19 @ 13:27)  POCT Blood Glucose.: 110 mg/dL (03-03-19 @ 09:34)  POCT Blood Glucose.: 80 mg/dL (03-03-19 @ 09:11)  POCT Blood Glucose.: 46 mg/dL (03-03-19 @ 08:46)  POCT Blood Glucose.: 114 mg/dL (03-02-19 @ 21:44)  POCT Blood Glucose.: 206 mg/dL (03-02-19 @ 17:41)  POCT Blood Glucose.: 421 mg/dL (03-02-19 @ 12:19)  POCT Blood Glucose.: 225 mg/dL (03-02-19 @ 08:24)  POCT Blood Glucose.: 145 mg/dL (03-02-19 @ 01:42)  POCT Blood Glucose.: 429 mg/dL (03-01-19 @ 22:09)  POCT Blood Glucose.: 268 mg/dL (03-01-19 @ 13:48)    03-04    137  |  97<L>  |  21  ----------------------------<  80  4.5   |  28  |  3.45<H>    EGFR if : 16  EGFR if non : 14    Ca    7.9<L>      03-04            Thyroid Function Tests:      Hemoglobin A1C, Whole Blood: 7.9 % <H> [4.0 - 5.6] (02-27-19 @ 11:08)  Hemoglobin A1C, Whole Blood: 7.6 % <H> [4.0 - 5.6] (02-13-19 @ 08:42) Chief Complaint/Follow-up on:   DM    Subjective:  POC blood glucose and insulin use reviewed.  NPO today for OR  glucose 92   unable to see pt as she was off the unit a the OR       MEDICATIONS  (STANDING):  aspirin enteric coated 81 milliGRAM(s) Oral daily  atorvastatin 20 milliGRAM(s) Oral at bedtime  cinacalcet 60 milliGRAM(s) Oral daily  dextrose 5%. 1000 milliLiter(s) (50 mL/Hr) IV Continuous <Continuous>  dextrose 50% Injectable 12.5 Gram(s) IV Push once  dextrose 50% Injectable 25 Gram(s) IV Push once  dextrose 50% Injectable 25 Gram(s) IV Push once  docusate sodium 100 milliGRAM(s) Oral three times a day  DULoxetine 60 milliGRAM(s) Oral daily  furosemide    Tablet 40 milliGRAM(s) Oral <User Schedule>  heparin  Injectable 5000 Unit(s) SubCutaneous every 12 hours  hydrALAZINE 100 milliGRAM(s) Oral every 8 hours  influenza   Vaccine 0.5 milliLiter(s) IntraMuscular once  insulin glargine Injectable (LANTUS) 3 Unit(s) SubCutaneous at bedtime  insulin lispro (HumaLOG) corrective regimen sliding scale   SubCutaneous every 6 hours  lisinopril 40 milliGRAM(s) Oral daily  metoprolol succinate ER 50 milliGRAM(s) Oral daily  multivitamin 1 Tablet(s) Oral daily  senna 2 Tablet(s) Oral at bedtime  sevelamer hydrochloride 800 milliGRAM(s) Oral three times a day with meals    MEDICATIONS  (PRN):  dextrose 40% Gel 15 Gram(s) Oral once PRN Blood Glucose LESS THAN 70 milliGRAM(s)/deciliter  glucagon  Injectable 1 milliGRAM(s) IntraMuscular once PRN Glucose LESS THAN 70 milligrams/deciliter  nitroglycerin     SubLingual 0.4 milliGRAM(s) SubLingual every 5 minutes PRN Chest Pain  oxyCODONE    IR 5 milliGRAM(s) Oral every 8 hours PRN Severe Pain (7 - 10)      PHYSICAL EXAM:  VITALS: T(C): 36.7 (03-04-19 @ 08:29)  T(F): 98 (03-04-19 @ 08:29), Max: 98.3 (03-03-19 @ 21:50)  HR: 73 (03-04-19 @ 08:29) (70 - 81)  BP: 121/62 (03-04-19 @ 08:29) (120/53 - 140/71)  RR:  (16 - 20)  SpO2:  (92% - 98%)  Wt(kg): --      POCT Blood Glucose.: 92 mg/dL (03-04-19 @ 05:04)  POCT Blood Glucose.: 194 mg/dL (03-03-19 @ 20:56)  POCT Blood Glucose.: 137 mg/dL (03-03-19 @ 17:49)  POCT Blood Glucose.: 96 mg/dL (03-03-19 @ 13:27)  POCT Blood Glucose.: 110 mg/dL (03-03-19 @ 09:34)  POCT Blood Glucose.: 80 mg/dL (03-03-19 @ 09:11)  POCT Blood Glucose.: 46 mg/dL (03-03-19 @ 08:46)  POCT Blood Glucose.: 114 mg/dL (03-02-19 @ 21:44)  POCT Blood Glucose.: 206 mg/dL (03-02-19 @ 17:41)  POCT Blood Glucose.: 421 mg/dL (03-02-19 @ 12:19)  POCT Blood Glucose.: 225 mg/dL (03-02-19 @ 08:24)  POCT Blood Glucose.: 145 mg/dL (03-02-19 @ 01:42)  POCT Blood Glucose.: 429 mg/dL (03-01-19 @ 22:09)  POCT Blood Glucose.: 268 mg/dL (03-01-19 @ 13:48)    03-04    137  |  97<L>  |  21  ----------------------------<  80  4.5   |  28  |  3.45<H>    EGFR if : 16  EGFR if non : 14    Ca    7.9<L>      03-04            Thyroid Function Tests:      Hemoglobin A1C, Whole Blood: 7.9 % <H> [4.0 - 5.6] (02-27-19 @ 11:08)  Hemoglobin A1C, Whole Blood: 7.6 % <H> [4.0 - 5.6] (02-13-19 @ 08:42)

## 2019-03-04 NOTE — PROGRESS NOTE ADULT - ATTENDING COMMENTS
Rasheeda Christian MD  Pager [phone] ([facility])/ [phone] ([facility]) [please provide 10 digit call back number]  Nights and weekends: [phone]  Please note that this patient may be followed by a different provider tomorrow. If no answer or after hours, please contact [phone].  For final dc reccomendations, please call [phone] or page the endocrine fellow on call.

## 2019-03-04 NOTE — PROGRESS NOTE ADULT - ASSESSMENT
63 y.o. female with h/o uncontrolled Type 1 DM with multiple complication with hypoglycemia here for endobronchial ultrasound for evaluation of lung lesion with pkan for the OR today 63 y.o. female with h/o uncontrolled Type 1 DM with multiple complication with hypoglycemia here for endobronchial ultrasound for evaluation of lung lesion with pan for the OR today

## 2019-03-04 NOTE — PROGRESS NOTE ADULT - PROBLEM SELECTOR PLAN 1
Patient will variable blood glucose levels and hypoglycemia unawareness  -Blood glucose target 100 to 180  -Would decrease Lantus 5 units QHS; (please resume this tonight once taking PO intake and trend 3 am glucose)  when NPO would decrease to 3 units, monitor FSBG q4hrs today and monitor for hypoglycemia   -Since po intake is minimal, will hold off on Humalog premeal doses but would continue Humalog low dose correction scale    inform endocrine of hypoglycemia or persistent hyperglycemia episodes as changes in pts insulin regimen will need to be made.   notify endocrine if any plans to be NPO/diet changes as this will also affect insulin regimen.    -Will follow up with outpatient endocrinologist, Dr. Ley and dose of insulin regimen will be determine. Patient will variable blood glucose levels and hypoglycemia unawareness  -Blood glucose target 100 to 180  -Would decrease Lantus 5 units from 10 prior QHS; (please resume this tonight once taking PO intake and trend 3 am glucose)  when NPO would decrease to 3 units, monitor FSBG q4hrs today and monitor for hypoglycemia   -Since po intake is minimal, will hold off on Humalog premeal doses but would continue Humalog low dose correction scale premeals and bedtime     inform endocrine of hypoglycemia or persistent hyperglycemia episodes as changes in pts insulin regimen will need to be made.   notify endocrine if any plans to be NPO/diet changes as this will also affect insulin regimen.    -Will follow up with outpatient endocrinologist, Dr. Ley and dose of insulin regimen will be determine.

## 2019-03-04 NOTE — DISCHARGE NOTE ADULT - CARE PROVIDER_API CALL
Peter Thurston)  Surgery; Thoracic Surgery  [address] [place] [address], [place] [address]  Phone: [phone]  Fax: [phone]  Follow Up Time:

## 2019-03-04 NOTE — PROGRESS NOTE ADULT - SUBJECTIVE AND OBJECTIVE BOX
[facility] [phone]        Patient seen and examined in bed.  She was in good spirits         MEDICATIONS  (STANDING):  aspirin enteric coated 81 milliGRAM(s) Oral daily  atorvastatin 20 milliGRAM(s) Oral at bedtime  cinacalcet 60 milliGRAM(s) Oral daily  dexamethasone  Injectable 4 milliGRAM(s) IV Push once  dextrose 5%. 1000 milliLiter(s) (50 mL/Hr) IV Continuous <Continuous>  dextrose 50% Injectable 12.5 Gram(s) IV Push once  dextrose 50% Injectable 25 Gram(s) IV Push once  dextrose 50% Injectable 25 Gram(s) IV Push once  docusate sodium 100 milliGRAM(s) Oral three times a day  DULoxetine 60 milliGRAM(s) Oral daily  furosemide    Tablet 40 milliGRAM(s) Oral <User Schedule>  heparin  Injectable 5000 Unit(s) SubCutaneous every 12 hours  hydrALAZINE 100 milliGRAM(s) Oral every 8 hours  influenza   Vaccine 0.5 milliLiter(s) IntraMuscular once  insulin glargine Injectable (LANTUS) 5 Unit(s) SubCutaneous at bedtime  insulin lispro (HumaLOG) corrective regimen sliding scale   SubCutaneous every 6 hours  lisinopril 40 milliGRAM(s) Oral daily  metoprolol succinate ER 50 milliGRAM(s) Oral daily  multivitamin 1 Tablet(s) Oral daily  senna 2 Tablet(s) Oral at bedtime  sevelamer hydrochloride 800 milliGRAM(s) Oral three times a day with meals      VITAL:  T(C): , Max: 36.8 (03-03-19 @ 21:50)  T(F): , Max: 98.3 (03-03-19 @ 21:50)  HR: 64 (03-04-19 @ 17:17)  BP: 124/54 (03-04-19 @ 17:17)  BP(mean): 72 (03-04-19 @ 17:17)  RR: 20 (03-04-19 @ 17:17)  SpO2: 97% (03-04-19 @ 17:17)  Wt(kg): --    I and O's:    03-03 @ 07:01  -  03-04 @ 07:00  --------------------------------------------------------  IN: 400 mL / OUT: 2000 mL / NET: -1600 mL      Height (cm): 157.48 (03-04 @ 08:29)  Weight (kg): 57.2 (03-04 @ 08:29)  BMI (kg/m2): 23.1 (03-04 @ 08:29)  BSA (m2): 1.57 (03-04 @ 08:29)    PHYSICAL EXAM:    Constitutional: NAD  Neck:  No JVD  Respiratory: CTAB/L  Cardiovascular: S1 and S2  Gastrointestinal: BS+, soft, NT/ND  Extremities: No peripheral edema  Neurological: A/O x 3, no focal deficits  Psychiatric: Normal mood, normal affect  : No Campos  Skin: No rashes  Access: avg     LABS:                        9.9    4.62  )-----------( 248      ( 04 Mar 2019 05:29 )             32.5     03-04    137  |  97<L>  |  21  ----------------------------<  80  4.5   |  28  |  3.45<H>    Ca    7.9<L>      04 Mar 2019 05:29            Urine Studies:          RADIOLOGY & ADDITIONAL STUDIES:

## 2019-03-04 NOTE — DISCHARGE NOTE ADULT - HOSPITAL COURSE
Patient is a 63 year old female with ESRD (on HD M/Tu/Th/Sa), type 1 DM, CAD, TIA, and PVD, Patient reports recent hospitalization for Pneumonia  discharged on 2/22/2019. Patient with a history of COPD, abnormal CT scan and Pulmonary nodule. Patient was referred to Dr. Thurston for an evaluation. Pre op diagnosis: Localized enlarged lymph nodes. Patient is scheduled for Flexible bronchoscopy, endobronchial ultrasound with radial probe and cytology, possible mediastinoscopy scheduled on 3/1/2019.     Patient arrived in [facility] with a potassium of 5.4 and a glucose of 429 secondary to her known ESRD. Case was cancelled due to hyperkalemia. Patient was admitted for HD and pre-op optimization. Dr. Hagen from nephrology was consulted. Procedure rescheduled for March 4. Endocrine consulted for known DM type 1 and labile glucose readings.  Patient dialyzed over the weekend.     3/4 Patient underwent Flexible bronchoscopy; EBUS w/LN biopsy, frozen non-diagnostic. Mediastinoscopy via cervical approach, frozen c/w benign, reactive tissue. The patient tolerated the procedure well. There were no complications. The patient was extubated in the OR.  The patient was transferred to the PACU in stable condition. Patient kept overnight to monitor for hypoglycemia and received HD in the AM of 3/5. Patient experienced some post-op nausea which resolved.     3/5 Patient stable for discharge. Pain controlled. Ambulating well. Tolerating diet. Stable for discharge home with outpatient follow up with Dr. Thurston and her endocrinologist in 1-2 weeks. Patient is a 63 year old female with ESRD (on HD M/Tu/Th/Sa), type 1 DM, CAD, TIA, and PVD, Patient reports recent hospitalization for Pneumonia  discharged on 2/22/2019. Patient with a history of COPD, abnormal CT scan and Pulmonary nodule. Patient was referred to Dr. Thurston for an evaluation. Pre op diagnosis: Localized enlarged lymph nodes. Patient is scheduled for Flexible bronchoscopy, endobronchial ultrasound with radial probe and cytology, possible mediastinoscopy scheduled on 3/1/2019.     Patient arrived in [facility] with a potassium of 5.4 and a glucose of 429 secondary to her known ESRD. Case was cancelled due to hyperkalemia. Patient was admitted for HD and pre-op optimization. Dr. Haegn from nephrology was consulted. Procedure rescheduled for March 4. Endocrine consulted for known DM type 1 and labile glucose readings.  Patient dialyzed over the weekend.     3/4 Patient underwent Flexible bronchoscopy; EBUS w/LN biopsy, frozen non-diagnostic. Mediastinoscopy via cervical approach, frozen c/w benign, reactive tissue. The patient tolerated the procedure well. There were no complications. The patient was extubated in the OR.  The patient was transferred to the PACU in stable condition. Patient kept overnight to monitor for hypoglycemia and received HD in the AM of 3/5. Patient experienced some post-op nausea which resolved.     3/5 Patient stable for discharge. Pain controlled. Ambulating well. Tolerating diet. Stable for discharge home with outpatient follow up with Dr. Thurston and her endocrinologist in 1-2 weeks.     I-STOP #: [id number]

## 2019-03-04 NOTE — DISCHARGE NOTE ADULT - INSTRUCTIONS
may resume your usual diabetic diet May resume your usual diabetic diet.  You may shower, but please do not soak your wounds in a bath or pool. Pat Dry wound. Leave the white steri strips in place, they will fall off on their own in approximately 5-7 days.   Please use over the counter pain medications as needed, like tylenol. You may also take your home med of oxycodone or percocet.   Please do not drive until your pain is well controlled, and you do not need to take pain medications. These medications may make you constipated. If so, please take over the counter stool softeners for symptoms.   NOTIFY YOUR SURGEON IF: You have any bleeding that does not stop, any pus draining from your wound, any fever (over 100.4 F) or chills, shortness of breath, chest pain, or if your pain is not controlled on your discharge pain medications.

## 2019-03-04 NOTE — DISCHARGE NOTE ADULT - CARE PROVIDERS DIRECT ADDRESSES
[email or web address]

## 2019-03-04 NOTE — DISCHARGE NOTE ADULT - CARE PLAN
Principal Discharge DX:	Localized enlarged lymph nodes  Goal:	Final pathology  Assessment and plan of treatment:	Walk frequently. You may shower daily. You can climb stairs.   Can keep wounds uncovered starting tomorrow.   See Dr. Thurston in 2 weeks. Call for an apt. [phone] Principal Discharge DX:	Localized enlarged lymph nodes  Goal:	Final pathology. s/p EBUS, and mediastinoscopy. Outpatient follow up.  Assessment and plan of treatment:	Walk frequently. You may shower daily. You can climb stairs.   Can keep wounds uncovered starting tomorrow 3/6/19.    See Dr. Thurston in 2 weeks. Call for an apt. [phone]  Secondary Diagnosis:	Diabetes mellitus type 1  Goal:	Insulin as stated above.  Assessment and plan of treatment:	Please follow up with your endocrinologist Dr. Ley in 1-2 weeks.  Secondary Diagnosis:	ESRD (end stage renal disease) on dialysis  Goal:	Resume outpatient dialysis as previously scheduled.  Secondary Diagnosis:	CAD (coronary artery disease)  Goal:	You may resume plavix 3/6/19 for your stents. Principal Discharge DX:	Localized enlarged lymph nodes  Goal:	Final pathology. s/p EBUS, and mediastinoscopy. Outpatient follow up.  Assessment and plan of treatment:	Walk frequently. You may shower daily. You can climb stairs.   Can keep wounds uncovered starting tomorrow 3/6/19.    See Dr. Thurston in 2 weeks. Call for an apt. [phone]  Secondary Diagnosis:	Diabetes mellitus type 1  Goal:	Insulin as stated above.  Assessment and plan of treatment:	Please follow up with your endocrinologist Dr. Ley in 1-2 weeks.  Secondary Diagnosis:	ESRD (end stage renal disease) on dialysis  Goal:	Resume outpatient dialysis as previously scheduled 4x/week.  Secondary Diagnosis:	CAD (coronary artery disease)  Goal:	You may resume plavix 3/6/19 for your stents. Principal Discharge DX:	Localized enlarged lymph nodes  Goal:	Final pathology. s/p EBUS, and mediastinoscopy. Outpatient follow up.  Assessment and plan of treatment:	Walk frequently. You may shower daily. You can climb stairs.   Can keep wounds uncovered starting tomorrow 3/6/19.    See Dr. Thurston in 2 weeks. Call for an apt. [phone]  Secondary Diagnosis:	Diabetes mellitus type 1  Goal:	Insulin as stated above.  Assessment and plan of treatment:	Please follow up with your endocrinologist Dr. Ley in 1-2 weeks.  Secondary Diagnosis:	ESRD (end stage renal disease) on dialysis  Goal:	Resume outpatient dialysis as previously scheduled 4x/week.  Secondary Diagnosis:	CAD (coronary artery disease)  Goal:	You may resume plavix today for your stents. Principal Discharge DX:	Localized enlarged lymph nodes  Goal:	Final pathology. s/p EBUS, and mediastinoscopy. Outpatient follow up.  Assessment and plan of treatment:	Walk frequently. You may shower daily. You can climb stairs.   Can keep wounds uncovered starting tomorrow 3/6/19.    See Dr. Thurston in 2 weeks. Call for an apt. [phone]  Secondary Diagnosis:	Diabetes mellitus type 1  Goal:	Insulin as stated above.  Assessment and plan of treatment:	Continue your premeal insulin as previously taking, only use Lantus 5mg at bedtime.   Please follow up with your endocrinologist Dr. Ley in 1 week.  Secondary Diagnosis:	ESRD (end stage renal disease) on dialysis  Goal:	Resume outpatient dialysis as previously scheduled 4x/week.  Secondary Diagnosis:	CAD (coronary artery disease)  Goal:	You may resume plavix today for your stents.

## 2019-03-04 NOTE — DISCHARGE NOTE ADULT - MEDICATION SUMMARY - MEDICATIONS TO STOP TAKING
I will STOP taking the medications listed below when I get home from the hospital:    oxycodone-acetaminophen 5 mg-325 mg oral tablet  -- 2 tab(s) by mouth , As Needed HS

## 2019-03-04 NOTE — PROGRESS NOTE ADULT - ASSESSMENT
The patient is a radha 63-year-old female with past medical history of hypertension, diabetes, end-stage renal disease who is scheduled for a possible mediastinoscopy who had hyperkalemia.     1.	Renal.  Will set up dialysis in am for the first session.    2.	Cardiothoracic.  Mediastinotomy and EBUS      3.         Endocrine.  Continue with insulin.      DC in am

## 2019-03-05 VITALS — SYSTOLIC BLOOD PRESSURE: 139 MMHG | DIASTOLIC BLOOD PRESSURE: 57 MMHG | HEART RATE: 76 BPM

## 2019-03-05 LAB
ANION GAP SERPL CALC-SCNC: 19 MMO/L — HIGH (ref 7–14)
ANION GAP SERPL CALC-SCNC: 26 MMO/L — HIGH (ref 7–14)
B-OH-BUTYR SERPL-SCNC: 3.9 MMOL/L — HIGH (ref 0–0.4)
BUN SERPL-MCNC: 14 MG/DL — SIGNIFICANT CHANGE UP (ref 7–23)
BUN SERPL-MCNC: 33 MG/DL — HIGH (ref 7–23)
CALCIUM SERPL-MCNC: 7.6 MG/DL — LOW (ref 8.4–10.5)
CALCIUM SERPL-MCNC: 8.3 MG/DL — LOW (ref 8.4–10.5)
CHLORIDE SERPL-SCNC: 89 MMOL/L — LOW (ref 98–107)
CHLORIDE SERPL-SCNC: 92 MMOL/L — LOW (ref 98–107)
CO2 SERPL-SCNC: 18 MMOL/L — LOW (ref 22–31)
CO2 SERPL-SCNC: 26 MMOL/L — SIGNIFICANT CHANGE UP (ref 22–31)
CREAT SERPL-MCNC: 2.96 MG/DL — HIGH (ref 0.5–1.3)
CREAT SERPL-MCNC: 5 MG/DL — HIGH (ref 0.5–1.3)
CULTURE - ACID FAST SMEAR CONCENTRATED: SIGNIFICANT CHANGE UP
GLUCOSE BLDC GLUCOMTR-MCNC: 103 MG/DL — HIGH (ref 70–99)
GLUCOSE BLDC GLUCOMTR-MCNC: 149 MG/DL — HIGH (ref 70–99)
GLUCOSE BLDC GLUCOMTR-MCNC: 150 MG/DL — HIGH (ref 70–99)
GLUCOSE BLDC GLUCOMTR-MCNC: 178 MG/DL — HIGH (ref 70–99)
GLUCOSE SERPL-MCNC: 152 MG/DL — HIGH (ref 70–99)
GLUCOSE SERPL-MCNC: 190 MG/DL — HIGH (ref 70–99)
HCT VFR BLD CALC: 32.7 % — LOW (ref 34.5–45)
HGB BLD-MCNC: 10.1 G/DL — LOW (ref 11.5–15.5)
MCHC RBC-ENTMCNC: 30.9 % — LOW (ref 32–36)
MCHC RBC-ENTMCNC: 32.8 PG — SIGNIFICANT CHANGE UP (ref 27–34)
MCV RBC AUTO: 106.2 FL — HIGH (ref 80–100)
NRBC # FLD: 0 K/UL — LOW (ref 25–125)
PLATELET # BLD AUTO: 257 K/UL — SIGNIFICANT CHANGE UP (ref 150–400)
PMV BLD: 10.2 FL — SIGNIFICANT CHANGE UP (ref 7–13)
POTASSIUM SERPL-MCNC: 4.2 MMOL/L — SIGNIFICANT CHANGE UP (ref 3.5–5.3)
POTASSIUM SERPL-MCNC: 5.2 MMOL/L — SIGNIFICANT CHANGE UP (ref 3.5–5.3)
POTASSIUM SERPL-SCNC: 4.2 MMOL/L — SIGNIFICANT CHANGE UP (ref 3.5–5.3)
POTASSIUM SERPL-SCNC: 5.2 MMOL/L — SIGNIFICANT CHANGE UP (ref 3.5–5.3)
RBC # BLD: 3.08 M/UL — LOW (ref 3.8–5.2)
RBC # FLD: 15 % — HIGH (ref 10.3–14.5)
SODIUM SERPL-SCNC: 133 MMOL/L — LOW (ref 135–145)
SODIUM SERPL-SCNC: 137 MMOL/L — SIGNIFICANT CHANGE UP (ref 135–145)
SPECIMEN SOURCE: SIGNIFICANT CHANGE UP
WBC # BLD: 8.8 K/UL — SIGNIFICANT CHANGE UP (ref 3.8–10.5)
WBC # FLD AUTO: 8.8 K/UL — SIGNIFICANT CHANGE UP (ref 3.8–10.5)

## 2019-03-05 PROCEDURE — 99238 HOSP IP/OBS DSCHRG MGMT 30/<: CPT

## 2019-03-05 PROCEDURE — 99232 SBSQ HOSP IP/OBS MODERATE 35: CPT

## 2019-03-05 PROCEDURE — 71045 X-RAY EXAM CHEST 1 VIEW: CPT | Mod: 26

## 2019-03-05 RX ORDER — METOCLOPRAMIDE HCL 10 MG
10 TABLET ORAL ONCE
Qty: 0 | Refills: 0 | Status: COMPLETED | OUTPATIENT
Start: 2019-03-05 | End: 2019-03-05

## 2019-03-05 RX ORDER — OXYCODONE HYDROCHLORIDE 5 MG/1
1 TABLET ORAL
Qty: 12 | Refills: 0 | OUTPATIENT
Start: 2019-03-05 | End: 2019-03-06

## 2019-03-05 RX ORDER — ONDANSETRON 8 MG/1
4 TABLET, FILM COATED ORAL ONCE
Qty: 0 | Refills: 0 | Status: COMPLETED | OUTPATIENT
Start: 2019-03-04 | End: 2019-03-05

## 2019-03-05 RX ORDER — CLOPIDOGREL BISULFATE 75 MG/1
1 TABLET, FILM COATED ORAL
Qty: 0 | Refills: 0 | COMMUNITY

## 2019-03-05 RX ORDER — DOCUSATE SODIUM 100 MG
1 CAPSULE ORAL
Qty: 10 | Refills: 0 | OUTPATIENT
Start: 2019-03-05 | End: 2019-03-07

## 2019-03-05 RX ADMIN — Medication 2: at 00:06

## 2019-03-05 RX ADMIN — Medication 100 MILLIGRAM(S): at 14:09

## 2019-03-05 RX ADMIN — Medication 81 MILLIGRAM(S): at 12:40

## 2019-03-05 RX ADMIN — Medication 1: at 05:50

## 2019-03-05 RX ADMIN — DULOXETINE HYDROCHLORIDE 60 MILLIGRAM(S): 30 CAPSULE, DELAYED RELEASE ORAL at 12:40

## 2019-03-05 RX ADMIN — SEVELAMER CARBONATE 800 MILLIGRAM(S): 2400 POWDER, FOR SUSPENSION ORAL at 12:40

## 2019-03-05 RX ADMIN — OXYCODONE HYDROCHLORIDE 5 MILLIGRAM(S): 5 TABLET ORAL at 16:12

## 2019-03-05 RX ADMIN — CINACALCET 60 MILLIGRAM(S): 30 TABLET, FILM COATED ORAL at 12:40

## 2019-03-05 RX ADMIN — OXYCODONE HYDROCHLORIDE 5 MILLIGRAM(S): 5 TABLET ORAL at 05:24

## 2019-03-05 RX ADMIN — Medication 1 TABLET(S): at 12:40

## 2019-03-05 RX ADMIN — OXYCODONE HYDROCHLORIDE 5 MILLIGRAM(S): 5 TABLET ORAL at 05:51

## 2019-03-05 RX ADMIN — ONDANSETRON 4 MILLIGRAM(S): 8 TABLET, FILM COATED ORAL at 00:06

## 2019-03-05 NOTE — PROGRESS NOTE ADULT - SUBJECTIVE AND OBJECTIVE BOX
Pt was initially with out complaints s/p FB, EBUS, mediastinoscopy but then experienced post-op NV.  After a dose of Zofran, she was able to sleep.      VSS    Gen: NAD  Lungs: CTA    A: stable post- procedure    P: Endocrine follow up     HD then possible discharge home     Follow up AM labs and CXR

## 2019-03-05 NOTE — PROGRESS NOTE ADULT - PROVIDER SPECIALTY LIST ADULT
Anesthesia
CT Surgery
Cardiology
Cardiology
Endocrinology
Nephrology
Thoracic Surgery
CT Surgery
Endocrinology

## 2019-03-05 NOTE — PROGRESS NOTE ADULT - SUBJECTIVE AND OBJECTIVE BOX
[facility] [phone]      Patient seen and examined in bed. No pain, no SOB. s/p HD this AM.      MEDICATIONS  (STANDING):  aspirin enteric coated 81 milliGRAM(s) Oral daily  atorvastatin 20 milliGRAM(s) Oral at bedtime  cinacalcet 60 milliGRAM(s) Oral daily  dextrose 5%. 1000 milliLiter(s) (50 mL/Hr) IV Continuous <Continuous>  dextrose 50% Injectable 12.5 Gram(s) IV Push once  dextrose 50% Injectable 25 Gram(s) IV Push once  dextrose 50% Injectable 25 Gram(s) IV Push once  docusate sodium 100 milliGRAM(s) Oral three times a day  DULoxetine 60 milliGRAM(s) Oral daily  furosemide    Tablet 40 milliGRAM(s) Oral <User Schedule>  heparin  Injectable 5000 Unit(s) SubCutaneous every 12 hours  hydrALAZINE 100 milliGRAM(s) Oral every 8 hours  influenza   Vaccine 0.5 milliLiter(s) IntraMuscular once  insulin glargine Injectable (LANTUS) 5 Unit(s) SubCutaneous at bedtime  insulin lispro (HumaLOG) corrective regimen sliding scale   SubCutaneous every 6 hours  lisinopril 40 milliGRAM(s) Oral daily  metoprolol succinate ER 50 milliGRAM(s) Oral daily  multivitamin 1 Tablet(s) Oral daily  senna 2 Tablet(s) Oral at bedtime  sevelamer hydrochloride 800 milliGRAM(s) Oral three times a day with meals      VITAL:  T(C): , Max: 36.8 (03-04-19 @ 16:00)  T(F): , Max: 98.2 (03-04-19 @ 16:00)  HR: 68 (03-05-19 @ 11:40)  BP: 141/57 (03-05-19 @ 11:40)  BP(mean): 72 (03-04-19 @ 17:17)  RR: 17 (03-05-19 @ 11:40)  SpO2: 100% (03-05-19 @ 11:40)    I and O's:    03-05 @ 07:01  -  03-05 @ 12:07  --------------------------------------------------------  IN: 400 mL / OUT: 2400 mL / NET: -2000 mL          PHYSICAL EXAM:    Constitutional: NAD  Neck:  No JVD  Respiratory: CTAB/L  Cardiovascular: S1 and S2  Gastrointestinal: BS+, soft, NT/ND  Extremities: No peripheral edema  Neurological: A/O x 3, no focal deficits  Psychiatric: Normal mood, normal affect  : No Campos  Skin: No rashes  Access: Left AVF, +thrill    LABS:                        10.1   8.80  )-----------( 257      ( 05 Mar 2019 06:15 )             32.7     03-05    133<L>  |  89<L>  |  33<H>  ----------------------------<  190<H>  5.2   |  18<L>  |  5.00<H>    Ca    7.6<L>      05 Mar 2019 06:15      ASSESSMENT/PLAN:  63yearold female with past medical history of hypertension, diabetes, endstage renal disease who is scheduled for a possible mediastinoscopy who had hyperkalemia.     1. Renal.  s/p HD this AM.  2. Cardiothoracic.  s/p Mediastinotomy and EBUS      3. Endocrine.  Continue with insulin.          GALINDO BlumC  [facility]  [phone]

## 2019-03-05 NOTE — PROGRESS NOTE ADULT - PROBLEM SELECTOR PROBLEM 1
Type 1 diabetes mellitus with hypoglycemia and without coma
Localized enlarged lymph nodes
Type 1 diabetes mellitus with hypoglycemia and without coma

## 2019-03-05 NOTE — PROGRESS NOTE ADULT - PROBLEM SELECTOR PLAN 1
BG well controlled today and with no hypoglycemia.  This AM noted increased anion gap (26) and decreased bicarb (18).  BMP was repeated with improvement AG 19 and bicarb 26.  BHB 3.9 noted to be increased but in setting of recent NPO status yesterday could be starvation ketosis and now resolving as other numbers improving and not consistent with DKA. Patient is asymptomatic.  Ok for discharge with close follow up of labs at HD (with renal).  DC plan:  Lantus 5 units qhs and she can resume home sliding scale of Humalog as per outside endocrine.  She will follow up with outpatient endocrinologist, Dr. Ley.

## 2019-03-05 NOTE — PROGRESS NOTE ADULT - ASSESSMENT
63 y.o. female with h/o uncontrolled Type 1 DM with multiple complication with hypoglycemia s/p endobronchial ultrasound for evaluation of lung lesion.

## 2019-03-05 NOTE — PROGRESS NOTE ADULT - SUBJECTIVE AND OBJECTIVE BOX
ANESTHESIA POSTOP CHECK    63y Female POSTOP DAY 1 S/P     Vital Signs Last 24 Hrs  T(C): 36.6 (05 Mar 2019 06:15), Max: 36.8 (04 Mar 2019 16:00)  T(F): 97.8 (05 Mar 2019 06:15), Max: 98.2 (04 Mar 2019 16:00)  HR: 69 (05 Mar 2019 06:15) (64 - 94)  BP: 141/73 (05 Mar 2019 06:15) (97/53 - 160/70)  BP(mean): 72 (04 Mar 2019 17:17) (65 - 85)  RR: 18 (05 Mar 2019 06:15) (8 - 20)  SpO2: 99% (05 Mar 2019 04:28) (94% - 100%)  I&O's Summary      [x ] NO APPARENT ANESTHESIA COMPLICATIONS      Comments:

## 2019-03-06 LAB
NON-GYNECOLOGICAL CYTOLOGY STUDY: SIGNIFICANT CHANGE UP
TM INTERPRETATION: SIGNIFICANT CHANGE UP
TM INTERPRETATION: SIGNIFICANT CHANGE UP

## 2019-03-07 LAB — SPECIMEN SOURCE: SIGNIFICANT CHANGE UP

## 2019-03-10 LAB — CULTURE - SURGICAL SITE: SIGNIFICANT CHANGE UP

## 2019-03-11 LAB — SPECIMEN SOURCE: SIGNIFICANT CHANGE UP

## 2019-03-11 NOTE — DIETITIAN INITIAL EVALUATION ADULT. - 30 CAL FROM
Otc Regimen: Neoptide hair lotion spray
Detail Level: Detailed
Initiate Treatment: Cicalfate over rx BID
Otc Regimen: BP 5% wash bid
6130

## 2019-03-12 LAB — SURGICAL PATHOLOGY STUDY: SIGNIFICANT CHANGE UP

## 2019-03-15 ENCOUNTER — APPOINTMENT (OUTPATIENT)
Dept: PULMONOLOGY | Facility: CLINIC | Age: 64
End: 2019-03-15
Payer: MEDICARE

## 2019-03-15 VITALS
TEMPERATURE: 98.2 F | SYSTOLIC BLOOD PRESSURE: 119 MMHG | HEART RATE: 76 BPM | OXYGEN SATURATION: 95 % | DIASTOLIC BLOOD PRESSURE: 67 MMHG

## 2019-03-15 DIAGNOSIS — R91.1 SOLITARY PULMONARY NODULE: ICD-10-CM

## 2019-03-15 PROCEDURE — 99214 OFFICE O/P EST MOD 30 MIN: CPT

## 2019-03-15 NOTE — REASON FOR VISIT
[Follow-Up] : a follow-up visit [FreeTextEntry2] : Stop E bus and mediastinoscopy\par Glass opacity CT chest imaging, COPD

## 2019-03-15 NOTE — DISCUSSION/SUMMARY
[FreeTextEntry1] : Reviewed cardiothoracic surgery evaluation\par  EBUS  and mediastinoscopy\par \par March 6, 2019\par Addendum\par Underwent bronchoscopy E bus mediastinoscopy\par AFB tissue culture negative\par Flow cytometry no diagnostic abnormalities\par LN pathology Negative Cancer cells with final pathology Reactive Lymph  nodes\par Rule out reactive secondary to underlying CHF\par Still cannot entirely exclude adenocarcinoma normal in situ locally  noninvasive lung cancer\par COPD\par Renal failure on hemodialysis\par Congestive heart failure with cardiac atherosclerotic disease\par History of pleural effusions\par CXR at f/u\par Small possibly partially loculated right pleural effusion with likely associated passive atelectasis\par Resolution of prior reported bilateral increased reticular opacities and more confluent right-sided opacities felt a reflection of improvement of pulmonary edema\par Residual increased reticular opacities at the right base possibly pulmonary vascular congestion or subsegmental atelectasis\par Next appointment will recommend after reviewing cardiothoracic office follow-up evaluation CT of the chest for serial comparison\par

## 2019-03-15 NOTE — HISTORY OF PRESENT ILLNESS
[FreeTextEntry1] : Status post E bus and mediastinoscopy cardiothoracic surgery to rule out lymphoma, rule out lung cancer\par History severe CHF\par Renal failure on hemodialysis\par Diabetes mellitus with history of recurrent episodes of DKA requiring hospitalization\par ASD\par Atherosclerotic heart disease \par pleural effusions secondary to congestive heart failure cardiac disease with fluid overload\par COPD\par Psychiatric disorder

## 2019-03-15 NOTE — REVIEW OF SYSTEMS
[As Noted in HPI] : as noted in HPI [Edema] : ~T edema was present [Claudication] : intermittent claudication [Diabetes] : diabetes mellitus [Negative] : Neurologic [Chest Discomfort] : no chest discomfort [PND] : no PND [Orthopnea] : no orthopnea [FreeTextEntry8] : POST EBUS with Med Bx negative flow cytology/ LN pathology Reactive  [FreeTextEntry9] : CHF ASHD [de-identified] : s/p DKA hospital admission

## 2019-03-21 NOTE — ED ADULT TRIAGE NOTE - NS ED NURSE AMBULANCES
March 21, 2019      Venkata Luo MD  [address]  [facility] [address]           [facility] [place] [facility]  [address], [address]  [facility] [address]  Phone: [phone]  Fax: [phone]          Patient: Rajesh Mas   MR Number: [id number]   YOB: 1950   Date of Visit: 3/21/2019       Dear Dr. Venkata Luo:    Thank you for referring Rajesh Mas to me for evaluation. Attached you will find relevant portions of my assessment and plan of care.    If you have questions, please do not hesitate to call me. I look forward to following Rajesh Mas along with you.    Sincerely,    RIGOBERTO Dietzosure  CC:  No Recipients    If you would like to receive this communication electronically, please contact [email or web address] or [phone] to request more information on [employer] access.    For providers and/or their staff who would like to refer a patient to [facility], please contact us through our one-stop-shop provider referral line, [facility], at [phone].    If you feel you have received this communication in error or would no longer like to receive these types of communications, please e-mail [email or web address]          [facility]

## 2019-03-25 ENCOUNTER — INPATIENT (INPATIENT)
Facility: HOSPITAL | Age: 64
LOS: 9 days | Discharge: HOME | DRG: 280 | End: 2019-04-04
Attending: INTERNAL MEDICINE | Admitting: INTERNAL MEDICINE
Payer: MEDICARE

## 2019-03-25 VITALS
SYSTOLIC BLOOD PRESSURE: 124 MMHG | RESPIRATION RATE: 18 BRPM | TEMPERATURE: 98 F | DIASTOLIC BLOOD PRESSURE: 75 MMHG | HEART RATE: 83 BPM | OXYGEN SATURATION: 100 %

## 2019-03-25 DIAGNOSIS — Z98.89 OTHER SPECIFIED POSTPROCEDURAL STATES: Chronic | ICD-10-CM

## 2019-03-25 PROBLEM — Z09 POSTOP CHECK: Status: ACTIVE | Noted: 2019-03-25

## 2019-03-25 LAB
ALBUMIN SERPL ELPH-MCNC: 4.1 G/DL — SIGNIFICANT CHANGE UP (ref 3.3–5)
ALP SERPL-CCNC: 71 U/L — SIGNIFICANT CHANGE UP (ref 40–120)
ALT FLD-CCNC: 12 U/L — SIGNIFICANT CHANGE UP (ref 10–45)
ANION GAP SERPL CALC-SCNC: 19 MMOL/L — HIGH (ref 5–17)
AST SERPL-CCNC: 34 U/L — SIGNIFICANT CHANGE UP (ref 10–40)
BILIRUB SERPL-MCNC: 0.3 MG/DL — SIGNIFICANT CHANGE UP (ref 0.2–1.2)
BUN SERPL-MCNC: 33 MG/DL — HIGH (ref 7–23)
CALCIUM SERPL-MCNC: 9.4 MG/DL — SIGNIFICANT CHANGE UP (ref 8.4–10.5)
CHLORIDE SERPL-SCNC: 94 MMOL/L — LOW (ref 96–108)
CO2 SERPL-SCNC: 25 MMOL/L — SIGNIFICANT CHANGE UP (ref 22–31)
CREAT SERPL-MCNC: 5.83 MG/DL — HIGH (ref 0.5–1.3)
GAS PNL BLDV: SIGNIFICANT CHANGE UP
GLUCOSE SERPL-MCNC: 231 MG/DL — HIGH (ref 70–99)
HCT VFR BLD CALC: 29 % — LOW (ref 34.5–45)
HGB BLD-MCNC: 10.1 G/DL — LOW (ref 11.5–15.5)
INR BLD: 1.05 RATIO — SIGNIFICANT CHANGE UP (ref 0.88–1.16)
MCHC RBC-ENTMCNC: 34.7 GM/DL — SIGNIFICANT CHANGE UP (ref 32–36)
MCHC RBC-ENTMCNC: 36 PG — HIGH (ref 27–34)
MCV RBC AUTO: 104 FL — HIGH (ref 80–100)
PLATELET # BLD AUTO: 238 K/UL — SIGNIFICANT CHANGE UP (ref 150–400)
POTASSIUM SERPL-MCNC: 6.6 MMOL/L — CRITICAL HIGH (ref 3.5–5.3)
POTASSIUM SERPL-SCNC: 6.6 MMOL/L — CRITICAL HIGH (ref 3.5–5.3)
PROT SERPL-MCNC: 6.9 G/DL — SIGNIFICANT CHANGE UP (ref 6–8.3)
PROTHROM AB SERPL-ACNC: 12 SEC — SIGNIFICANT CHANGE UP (ref 10–12.9)
RBC # BLD: 2.8 M/UL — LOW (ref 3.8–5.2)
RBC # FLD: 14.7 % — HIGH (ref 10.3–14.5)
SODIUM SERPL-SCNC: 138 MMOL/L — SIGNIFICANT CHANGE UP (ref 135–145)
WBC # BLD: 5.7 K/UL — SIGNIFICANT CHANGE UP (ref 3.8–10.5)
WBC # FLD AUTO: 5.7 K/UL — SIGNIFICANT CHANGE UP (ref 3.8–10.5)

## 2019-03-25 PROCEDURE — 93010 ELECTROCARDIOGRAM REPORT: CPT

## 2019-03-25 PROCEDURE — 71046 X-RAY EXAM CHEST 2 VIEWS: CPT | Mod: 26

## 2019-03-25 PROCEDURE — 99285 EMERGENCY DEPT VISIT HI MDM: CPT | Mod: GC,25

## 2019-03-25 RX ORDER — SODIUM CHLORIDE 9 MG/ML
1000 INJECTION, SOLUTION INTRAVENOUS ONCE
Qty: 0 | Refills: 0 | Status: COMPLETED | OUTPATIENT
Start: 2019-03-25 | End: 2019-03-25

## 2019-03-25 RX ADMIN — SODIUM CHLORIDE 500 MILLILITER(S): 9 INJECTION, SOLUTION INTRAVENOUS at 22:18

## 2019-03-25 NOTE — ED PROVIDER NOTE - OBJECTIVE STATEMENT
Private Physician [facility], Elia pcp, Tee Biswas Cards  63y female pmh Former Smoker, CAD w ptca #7 Stents, CHF,COPD,CVA,DM,Gout, HLD,ESRD on HD (Tu,Th,Sa,M) Gout, PVD with maribell Fem Pop bypass, Subclavian ASD/VSD Repair. PT comes to ed complains of SOB past two hours, "9 out of 10 times it's the potassium"  Now feeling mildly improved. Dizzyness without loc with pedal edema. No cp,cough,hemoptysis,sputum,nvdc,. Last admit approx 3wk ago to [facility] for   hyperkalemia and lung bx. Private Physician Braden [facility], Elia pcp, Tee Biswas Cards  63y female pmh Former Smoker, CAD w ptca #7 Stents, CHF,COPD,CVA,DM,Gout, HLD,ESRD on HD (Tu,Th,Sa,M) Gout, PVD with maribell Fem Pop bypass, Subclavian ASD/VSD Repair. PT comes to ed complains of SOB past two hours, "9 out of 10 times it's the potassium"  Now feeling mildly improved. Dizzyness without loc with pedal edema. No cp,cough,hemoptysis,sputum,nvdc,. Last admit approx 3wk ago to [facility] for hyperkalemia and lung bx.    63 year old female with ESRD (on HD M/Tu/Th/Sa), type 1 DM, CAD, TIA, and PVD, Patient reports recent hospitalization for Pneumonia discharged on 2/22/2019. Patient with a history of COPD, abnormal CT scan and Pulmonary nodule pw shortness of breath for 2 hours, concern for hyperkalemia, no chest pain, cough, n/v/d/c.

## 2019-03-25 NOTE — ED PROVIDER NOTE - PHYSICAL EXAMINATION
Physical Exam:  Gen: NAD, AOx3, non-toxic appearing, able to ambulate without assistance  Head: NCAT  HEENT: EOMI, PEERLA, normal conjunctiva, tongue midline, oral mucosa moist  Lung: no respiratory distress, +rales B/L l>r, speaking in full sentences  CV: RRR, no murmurs, rubs or gallops  Abd: soft, NTND, no guarding, no rigidity, no CVA tenderness   MSK: no visible deformities, ROM normal in UE/LE, no back pain  Neuro: No focal sensory or motor deficits  Skin: Warm, well perfused, no rash  Psych: normal affect, calm  ~Catrachito Youssef D.O. -Resident

## 2019-03-25 NOTE — ED ADULT NURSE NOTE - OBJECTIVE STATEMENT
62 yo female with PMH ESRD dialysis T/THR/Sat, HTN COPD CHF, HLD BIBEMS presenting to ED for "high postassium" EMS states "she gets extra dialysis on mondays if they have room for her. She missed it and called us. We called 911 because [place] is closer to her location. They got there, she refused to go to [facility] and waited over an hour for us to get there. She claims she has high potassium but it wasn't checked or confirmed since her last dialysis on saturday. She was c/o SOB but she was 100% on room air with no difficulty breathing. She insisted on putting herself on oxygen." Upon exam pt A&Ox3 gross neuro intact, lungs cta bilaterally, no difficulty speaking in complete sentences, s1s2 heart sounds heard,  pt showing no signs/ symptoms of difficulty breathing, abdomen soft nontender nondistended, skin intact. Pt denies chest pain, sob, ha, n/v/d, abdominal pain, f/c, urinary symptoms, hematuria EKG done & given to MD. Placed on cardiac monitor NSR 80s. Labs drawn & sent.

## 2019-03-25 NOTE — ED PROVIDER NOTE - CARE PLAN
Principal Discharge DX:	Congestive heart failure, unspecified HF chronicity, unspecified heart failure type  Secondary Diagnosis:	SOB (shortness of breath)

## 2019-03-25 NOTE — ED ADULT NURSE NOTE - ED STAT RN HANDOFF DETAILS 3
Report given to dialysis RN Min. Transport scheduled to take pt to dialysis at 1pm. Pt A&Ox4. Was evaluated by Dr Beavers at 845 AM. Pt ate breakfast. Appetite good. Was given Insulin coverage prior to meal for glucose fingerstick 298.

## 2019-03-25 NOTE — ED PROVIDER NOTE - CLINICAL SUMMARY MEDICAL DECISION MAKING FREE TEXT BOX
Pt with esrd,cad,pw shortness of breath ? elevated k. RO chf/pna/acs, check xr trop,ekg,labs, reassess  Rudy Bartholomew MD, Facep Pt with esrd,cad,pw shortness of breath ? elevated k. RO chf/pna/acs, check xr trop,ekg,labs, reassess  Rudy Bartholomew MD, Facep    63 year old female with ESRD (on HD M/Tu/Th/Sa), type 1 DM, CAD, TIA, and PVD, pw shortness of breath for 2 hours concern for volume overload, chf vs pna trop, ekg, labs, reassess admit

## 2019-03-25 NOTE — ED PROVIDER NOTE - NS ED ROS FT
ROS:  GENERAL: No fever, no chills  EYES: no change in vision  HEENT: no trouble swallowing, no trouble speaking  CARDIAC: no chest pain  PULMONARY: no cough, + shortness of breath  GI: no abdominal pain, no nausea, no vomiting, no diarrhea, no constipation  : No dysuria, no frequency, no change in appearance, or odor of urine  SKIN: no rashes  NEURO: no headache, no weakness  MSK: No joint pain  ~Catrachito Youssef D.O. -Resident

## 2019-03-26 ENCOUNTER — APPOINTMENT (OUTPATIENT)
Dept: THORACIC SURGERY | Facility: CLINIC | Age: 64
End: 2019-03-26

## 2019-03-26 DIAGNOSIS — Z09 ENCOUNTER FOR FOLLOW-UP EXAMINATION AFTER COMPLETED TREATMENT FOR CONDITIONS OTHER THAN MALIGNANT NEOPLASM: ICD-10-CM

## 2019-03-26 DIAGNOSIS — N18.6 END STAGE RENAL DISEASE: ICD-10-CM

## 2019-03-26 DIAGNOSIS — E87.5 HYPERKALEMIA: ICD-10-CM

## 2019-03-26 DIAGNOSIS — I21.4 NON-ST ELEVATION (NSTEMI) MYOCARDIAL INFARCTION: ICD-10-CM

## 2019-03-26 DIAGNOSIS — I95.89 OTHER HYPOTENSION: ICD-10-CM

## 2019-03-26 DIAGNOSIS — I50.22 CHRONIC SYSTOLIC (CONGESTIVE) HEART FAILURE: ICD-10-CM

## 2019-03-26 DIAGNOSIS — I50.9 HEART FAILURE, UNSPECIFIED: ICD-10-CM

## 2019-03-26 DIAGNOSIS — E10.51 TYPE 1 DIABETES MELLITUS WITH DIABETIC PERIPHERAL ANGIOPATHY WITHOUT GANGRENE: ICD-10-CM

## 2019-03-26 DIAGNOSIS — R91.8 OTHER NONSPECIFIC ABNORMAL FINDING OF LUNG FIELD: ICD-10-CM

## 2019-03-26 LAB
ALBUMIN SERPL ELPH-MCNC: 4.2 G/DL — SIGNIFICANT CHANGE UP (ref 3.3–5)
ALP SERPL-CCNC: 69 U/L — SIGNIFICANT CHANGE UP (ref 40–120)
ALT FLD-CCNC: 12 U/L — SIGNIFICANT CHANGE UP (ref 10–45)
ANION GAP SERPL CALC-SCNC: 17 MMOL/L — SIGNIFICANT CHANGE UP (ref 5–17)
ANION GAP SERPL CALC-SCNC: 18 MMOL/L — HIGH (ref 5–17)
AST SERPL-CCNC: 20 U/L — SIGNIFICANT CHANGE UP (ref 10–40)
BASE EXCESS BLDV CALC-SCNC: 8.6 MMOL/L — HIGH (ref -2–2)
BILIRUB SERPL-MCNC: 0.3 MG/DL — SIGNIFICANT CHANGE UP (ref 0.2–1.2)
BUN SERPL-MCNC: 12 MG/DL — SIGNIFICANT CHANGE UP (ref 7–23)
BUN SERPL-MCNC: 33 MG/DL — HIGH (ref 7–23)
CA-I SERPL-SCNC: 1.09 MMOL/L — LOW (ref 1.12–1.3)
CALCIUM SERPL-MCNC: 9 MG/DL — SIGNIFICANT CHANGE UP (ref 8.4–10.5)
CALCIUM SERPL-MCNC: 9.4 MG/DL — SIGNIFICANT CHANGE UP (ref 8.4–10.5)
CHLORIDE BLDV-SCNC: 100 MMOL/L — SIGNIFICANT CHANGE UP (ref 96–108)
CHLORIDE SERPL-SCNC: 95 MMOL/L — LOW (ref 96–108)
CHLORIDE SERPL-SCNC: 96 MMOL/L — SIGNIFICANT CHANGE UP (ref 96–108)
CO2 BLDV-SCNC: 36 MMOL/L — HIGH (ref 22–30)
CO2 SERPL-SCNC: 28 MMOL/L — SIGNIFICANT CHANGE UP (ref 22–31)
CO2 SERPL-SCNC: 29 MMOL/L — SIGNIFICANT CHANGE UP (ref 22–31)
CREAT SERPL-MCNC: 3.13 MG/DL — HIGH (ref 0.5–1.3)
CREAT SERPL-MCNC: 6.04 MG/DL — HIGH (ref 0.5–1.3)
GAS PNL BLDV: 136 MMOL/L — SIGNIFICANT CHANGE UP (ref 136–145)
GAS PNL BLDV: SIGNIFICANT CHANGE UP
GAS PNL BLDV: SIGNIFICANT CHANGE UP
GLUCOSE BLDC GLUCOMTR-MCNC: 111 MG/DL — HIGH (ref 70–99)
GLUCOSE BLDC GLUCOMTR-MCNC: 236 MG/DL — HIGH (ref 70–99)
GLUCOSE BLDC GLUCOMTR-MCNC: 298 MG/DL — HIGH (ref 70–99)
GLUCOSE BLDC GLUCOMTR-MCNC: 313 MG/DL — HIGH (ref 70–99)
GLUCOSE BLDC GLUCOMTR-MCNC: 326 MG/DL — HIGH (ref 70–99)
GLUCOSE BLDC GLUCOMTR-MCNC: 86 MG/DL — SIGNIFICANT CHANGE UP (ref 70–99)
GLUCOSE BLDV-MCNC: 156 MG/DL — HIGH (ref 70–99)
GLUCOSE SERPL-MCNC: 163 MG/DL — HIGH (ref 70–99)
GLUCOSE SERPL-MCNC: 94 MG/DL — SIGNIFICANT CHANGE UP (ref 70–99)
HCO3 BLDV-SCNC: 34 MMOL/L — HIGH (ref 21–29)
HCT VFR BLDA CALC: 31 % — LOW (ref 39–50)
HGB BLD CALC-MCNC: 10 G/DL — LOW (ref 11.5–15.5)
LACTATE BLDV-MCNC: 1.3 MMOL/L — SIGNIFICANT CHANGE UP (ref 0.7–2)
MAGNESIUM SERPL-MCNC: 2.3 MG/DL — SIGNIFICANT CHANGE UP (ref 1.6–2.6)
NT-PROBNP SERPL-SCNC: HIGH PG/ML (ref 0–300)
OTHER CELLS CSF MANUAL: 7 ML/DL — LOW (ref 18–22)
PCO2 BLDV: 56 MMHG — HIGH (ref 35–50)
PH BLDV: 7.4 — SIGNIFICANT CHANGE UP (ref 7.35–7.45)
PO2 BLDV: 33 MMHG — SIGNIFICANT CHANGE UP (ref 25–45)
POTASSIUM BLDV-SCNC: 5.7 MMOL/L — HIGH (ref 3.5–5.3)
POTASSIUM SERPL-MCNC: 4.6 MMOL/L — SIGNIFICANT CHANGE UP (ref 3.5–5.3)
POTASSIUM SERPL-MCNC: 5.9 MMOL/L — HIGH (ref 3.5–5.3)
POTASSIUM SERPL-SCNC: 4.6 MMOL/L — SIGNIFICANT CHANGE UP (ref 3.5–5.3)
POTASSIUM SERPL-SCNC: 5.9 MMOL/L — HIGH (ref 3.5–5.3)
PROT SERPL-MCNC: 6.7 G/DL — SIGNIFICANT CHANGE UP (ref 6–8.3)
RAPID RVP RESULT: SIGNIFICANT CHANGE UP
SAO2 % BLDV: 52 % — LOW (ref 67–88)
SODIUM SERPL-SCNC: 141 MMOL/L — SIGNIFICANT CHANGE UP (ref 135–145)
SODIUM SERPL-SCNC: 142 MMOL/L — SIGNIFICANT CHANGE UP (ref 135–145)
TROPONIN T, HIGH SENSITIVITY RESULT: 390 NG/L — HIGH (ref 0–51)
TROPONIN T, HIGH SENSITIVITY RESULT: 454 NG/L — HIGH (ref 0–51)

## 2019-03-26 PROCEDURE — 71045 X-RAY EXAM CHEST 1 VIEW: CPT | Mod: 26

## 2019-03-26 PROCEDURE — 99223 1ST HOSP IP/OBS HIGH 75: CPT

## 2019-03-26 PROCEDURE — 71250 CT THORAX DX C-: CPT | Mod: 26

## 2019-03-26 RX ORDER — INSULIN LISPRO 100/ML
VIAL (ML) SUBCUTANEOUS
Qty: 0 | Refills: 0 | Status: DISCONTINUED | OUTPATIENT
Start: 2019-03-26 | End: 2019-03-26

## 2019-03-26 RX ORDER — INSULIN LISPRO 100/ML
VIAL (ML) SUBCUTANEOUS AT BEDTIME
Qty: 0 | Refills: 0 | Status: DISCONTINUED | OUTPATIENT
Start: 2019-03-26 | End: 2019-03-26

## 2019-03-26 RX ORDER — DEXTROSE 50 % IN WATER 50 %
25 SYRINGE (ML) INTRAVENOUS ONCE
Qty: 0 | Refills: 0 | Status: DISCONTINUED | OUTPATIENT
Start: 2019-03-26 | End: 2019-03-26

## 2019-03-26 RX ORDER — DEXTROSE 50 % IN WATER 50 %
12.5 SYRINGE (ML) INTRAVENOUS ONCE
Qty: 0 | Refills: 0 | Status: DISCONTINUED | OUTPATIENT
Start: 2019-03-26 | End: 2019-03-26

## 2019-03-26 RX ORDER — CLOPIDOGREL BISULFATE 75 MG/1
75 TABLET, FILM COATED ORAL DAILY
Qty: 0 | Refills: 0 | Status: DISCONTINUED | OUTPATIENT
Start: 2019-03-26 | End: 2019-04-04

## 2019-03-26 RX ORDER — DEXTROSE 50 % IN WATER 50 %
15 SYRINGE (ML) INTRAVENOUS ONCE
Qty: 0 | Refills: 0 | Status: DISCONTINUED | OUTPATIENT
Start: 2019-03-26 | End: 2019-04-04

## 2019-03-26 RX ORDER — INSULIN LISPRO 100/ML
2 VIAL (ML) SUBCUTANEOUS ONCE
Qty: 0 | Refills: 0 | Status: COMPLETED | OUTPATIENT
Start: 2019-03-26 | End: 2019-03-26

## 2019-03-26 RX ORDER — SODIUM CHLORIDE 9 MG/ML
200 INJECTION INTRAMUSCULAR; INTRAVENOUS; SUBCUTANEOUS ONCE
Qty: 0 | Refills: 0 | Status: COMPLETED | OUTPATIENT
Start: 2019-03-26 | End: 2019-03-26

## 2019-03-26 RX ORDER — SODIUM CHLORIDE 9 MG/ML
1000 INJECTION, SOLUTION INTRAVENOUS
Qty: 0 | Refills: 0 | Status: DISCONTINUED | OUTPATIENT
Start: 2019-03-26 | End: 2019-04-04

## 2019-03-26 RX ORDER — GLUCAGON INJECTION, SOLUTION 0.5 MG/.1ML
1 INJECTION, SOLUTION SUBCUTANEOUS ONCE
Qty: 0 | Refills: 0 | Status: DISCONTINUED | OUTPATIENT
Start: 2019-03-26 | End: 2019-03-26

## 2019-03-26 RX ORDER — ASPIRIN/CALCIUM CARB/MAGNESIUM 324 MG
81 TABLET ORAL DAILY
Qty: 0 | Refills: 0 | Status: DISCONTINUED | OUTPATIENT
Start: 2019-03-26 | End: 2019-04-04

## 2019-03-26 RX ORDER — INSULIN LISPRO 100/ML
VIAL (ML) SUBCUTANEOUS AT BEDTIME
Qty: 0 | Refills: 0 | Status: DISCONTINUED | OUTPATIENT
Start: 2019-03-26 | End: 2019-04-04

## 2019-03-26 RX ORDER — HEPARIN SODIUM 5000 [USP'U]/ML
5000 INJECTION INTRAVENOUS; SUBCUTANEOUS EVERY 8 HOURS
Qty: 0 | Refills: 0 | Status: DISCONTINUED | OUTPATIENT
Start: 2019-03-26 | End: 2019-04-04

## 2019-03-26 RX ORDER — DULOXETINE HYDROCHLORIDE 30 MG/1
60 CAPSULE, DELAYED RELEASE ORAL DAILY
Qty: 0 | Refills: 0 | Status: DISCONTINUED | OUTPATIENT
Start: 2019-03-26 | End: 2019-04-04

## 2019-03-26 RX ORDER — SEVELAMER CARBONATE 2400 MG/1
800 POWDER, FOR SUSPENSION ORAL
Qty: 0 | Refills: 0 | Status: DISCONTINUED | OUTPATIENT
Start: 2019-03-26 | End: 2019-04-01

## 2019-03-26 RX ORDER — ACETAMINOPHEN 500 MG
2 TABLET ORAL
Qty: 0 | Refills: 0 | COMMUNITY

## 2019-03-26 RX ORDER — INSULIN GLARGINE 100 [IU]/ML
5 INJECTION, SOLUTION SUBCUTANEOUS AT BEDTIME
Qty: 0 | Refills: 0 | Status: DISCONTINUED | OUTPATIENT
Start: 2019-03-26 | End: 2019-03-27

## 2019-03-26 RX ORDER — SODIUM CHLORIDE 9 MG/ML
250 INJECTION INTRAMUSCULAR; INTRAVENOUS; SUBCUTANEOUS ONCE
Qty: 0 | Refills: 0 | Status: DISCONTINUED | OUTPATIENT
Start: 2019-03-26 | End: 2019-03-26

## 2019-03-26 RX ORDER — ATORVASTATIN CALCIUM 80 MG/1
20 TABLET, FILM COATED ORAL AT BEDTIME
Qty: 0 | Refills: 0 | Status: DISCONTINUED | OUTPATIENT
Start: 2019-03-26 | End: 2019-04-04

## 2019-03-26 RX ORDER — SODIUM CHLORIDE 9 MG/ML
250 INJECTION, SOLUTION INTRAVENOUS ONCE
Qty: 0 | Refills: 0 | Status: COMPLETED | OUTPATIENT
Start: 2019-03-26 | End: 2019-03-26

## 2019-03-26 RX ORDER — GLUCAGON INJECTION, SOLUTION 0.5 MG/.1ML
1 INJECTION, SOLUTION SUBCUTANEOUS ONCE
Qty: 0 | Refills: 0 | Status: DISCONTINUED | OUTPATIENT
Start: 2019-03-26 | End: 2019-04-04

## 2019-03-26 RX ORDER — DEXTROSE 50 % IN WATER 50 %
15 SYRINGE (ML) INTRAVENOUS ONCE
Qty: 0 | Refills: 0 | Status: DISCONTINUED | OUTPATIENT
Start: 2019-03-26 | End: 2019-03-26

## 2019-03-26 RX ORDER — INSULIN GLARGINE 100 [IU]/ML
5 INJECTION, SOLUTION SUBCUTANEOUS ONCE
Qty: 0 | Refills: 0 | Status: COMPLETED | OUTPATIENT
Start: 2019-03-26 | End: 2019-03-26

## 2019-03-26 RX ORDER — OXYCODONE HYDROCHLORIDE 5 MG/1
5 TABLET ORAL EVERY 4 HOURS
Qty: 0 | Refills: 0 | Status: DISCONTINUED | OUTPATIENT
Start: 2019-03-26 | End: 2019-03-28

## 2019-03-26 RX ORDER — DEXTROSE 50 % IN WATER 50 %
12.5 SYRINGE (ML) INTRAVENOUS ONCE
Qty: 0 | Refills: 0 | Status: DISCONTINUED | OUTPATIENT
Start: 2019-03-26 | End: 2019-04-04

## 2019-03-26 RX ORDER — CINACALCET 30 MG/1
60 TABLET, FILM COATED ORAL DAILY
Qty: 0 | Refills: 0 | Status: DISCONTINUED | OUTPATIENT
Start: 2019-03-26 | End: 2019-04-01

## 2019-03-26 RX ORDER — INSULIN LISPRO 100/ML
VIAL (ML) SUBCUTANEOUS
Qty: 0 | Refills: 0 | Status: DISCONTINUED | OUTPATIENT
Start: 2019-03-26 | End: 2019-04-04

## 2019-03-26 RX ORDER — SODIUM CHLORIDE 9 MG/ML
1000 INJECTION, SOLUTION INTRAVENOUS
Qty: 0 | Refills: 0 | Status: DISCONTINUED | OUTPATIENT
Start: 2019-03-26 | End: 2019-03-26

## 2019-03-26 RX ORDER — METOPROLOL TARTRATE 50 MG
25 TABLET ORAL
Qty: 0 | Refills: 0 | Status: DISCONTINUED | OUTPATIENT
Start: 2019-03-26 | End: 2019-03-28

## 2019-03-26 RX ORDER — DEXTROSE 50 % IN WATER 50 %
25 SYRINGE (ML) INTRAVENOUS ONCE
Qty: 0 | Refills: 0 | Status: DISCONTINUED | OUTPATIENT
Start: 2019-03-26 | End: 2019-04-04

## 2019-03-26 RX ORDER — SODIUM POLYSTYRENE SULFONATE 4.1 MEQ/G
30 POWDER, FOR SUSPENSION ORAL ONCE
Qty: 0 | Refills: 0 | Status: COMPLETED | OUTPATIENT
Start: 2019-03-26 | End: 2019-03-26

## 2019-03-26 RX ADMIN — ATORVASTATIN CALCIUM 20 MILLIGRAM(S): 80 TABLET, FILM COATED ORAL at 22:17

## 2019-03-26 RX ADMIN — SODIUM POLYSTYRENE SULFONATE 30 GRAM(S): 4.1 POWDER, FOR SUSPENSION ORAL at 06:43

## 2019-03-26 RX ADMIN — OXYCODONE HYDROCHLORIDE 5 MILLIGRAM(S): 5 TABLET ORAL at 18:25

## 2019-03-26 RX ADMIN — Medication 81 MILLIGRAM(S): at 09:02

## 2019-03-26 RX ADMIN — Medication 2: at 13:07

## 2019-03-26 RX ADMIN — Medication 3: at 09:39

## 2019-03-26 RX ADMIN — Medication 2 UNIT(S): at 03:50

## 2019-03-26 RX ADMIN — CINACALCET 60 MILLIGRAM(S): 30 TABLET, FILM COATED ORAL at 22:17

## 2019-03-26 RX ADMIN — OXYCODONE HYDROCHLORIDE 5 MILLIGRAM(S): 5 TABLET ORAL at 13:46

## 2019-03-26 RX ADMIN — INSULIN GLARGINE 5 UNIT(S): 100 INJECTION, SOLUTION SUBCUTANEOUS at 05:13

## 2019-03-26 RX ADMIN — INSULIN GLARGINE 5 UNIT(S): 100 INJECTION, SOLUTION SUBCUTANEOUS at 22:17

## 2019-03-26 RX ADMIN — SEVELAMER CARBONATE 800 MILLIGRAM(S): 2400 POWDER, FOR SUSPENSION ORAL at 10:42

## 2019-03-26 RX ADMIN — DULOXETINE HYDROCHLORIDE 60 MILLIGRAM(S): 30 CAPSULE, DELAYED RELEASE ORAL at 09:02

## 2019-03-26 RX ADMIN — CLOPIDOGREL BISULFATE 75 MILLIGRAM(S): 75 TABLET, FILM COATED ORAL at 09:02

## 2019-03-26 RX ADMIN — Medication 25 MILLIGRAM(S): at 09:43

## 2019-03-26 RX ADMIN — Medication 25 MILLIGRAM(S): at 22:17

## 2019-03-26 RX ADMIN — SODIUM CHLORIDE 500 MILLILITER(S): 9 INJECTION, SOLUTION INTRAVENOUS at 04:23

## 2019-03-26 RX ADMIN — SODIUM CHLORIDE 200 MILLILITER(S): 9 INJECTION INTRAMUSCULAR; INTRAVENOUS; SUBCUTANEOUS at 03:53

## 2019-03-26 NOTE — H&P ADULT - PROBLEM SELECTOR PLAN 3
- unclear etiology  - improved after 1.5L IVF  - monitor for infection.   - will get CT chest noncontrast, blood cultures, monitor for fever. VS q4h  - holding BP meds except metoprolol

## 2019-03-26 NOTE — H&P ADULT - NSHPPHYSICALEXAM_GEN_ALL_CORE
PHYSICAL EXAM:   GENERAL: Alert. Not confused. +mild distress 2/2 pain in left foot. +thin. Not cachectic. Not obese.  HEAD:  Atraumatic. Normocephalic.  EYES: EOMI. PERRLA. Normal conjunctiva/sclera.  ENT: Neck supple. No JVD. Moist oral mucosa. +edentulous. No thrush.  LYMPH: Normal supraclavicular/cervical lymph nodes.   CARDIAC: Not tachy, Not chauncey. Regular rhythm. Not irregularly irregular. S1. S2. No murmur. No rub. No distant heart sounds.  LUNG/CHEST: CTAB. BS equal bilaterally. No wheezes. No rales. No rhonchi.  ABDOMEN: Soft. No tenderness. No distension. No fluid wave. Normal bowel sounds.  BACK: No midline/vertebral tenderness. No flank tenderness.  VASCULAR: +2 b/l radial or ulnar pulses. Palpable DP pulses.  EXTREMITIES:  No clubbing. No cyanosis. No edema. Moving all 4.  NEUROLOGY: A&Ox3. Non-focal exam. Cranial nerves intact. Normal speech. Sensation intact.  PSYCH: Normal behavior. Normal affect.  SKIN: No jaundice. No erythema. No rash/lesion.  Vascular Access:     ICU Vital Signs Last 24 Hrs  T(C): 36.4 (26 Mar 2019 03:54), Max: 36.7 (25 Mar 2019 21:53)  T(F): 97.5 (26 Mar 2019 03:54), Max: 98 (25 Mar 2019 21:53)  HR: 85 (26 Mar 2019 06:30) (83 - 87)  BP: 137/76 (26 Mar 2019 06:30) (89/50 - 144/70)  BP(mean): --  ABP: --  ABP(mean): --  RR: 18 (26 Mar 2019 06:30) (18 - 20)  SpO2: 95% (26 Mar 2019 06:30) (95% - 100%)      I&O's Summary

## 2019-03-26 NOTE — H&P ADULT - PROBLEM SELECTOR PLAN 4
- EBUS/biopsies earlier this month showed reactive lymphadenopathy, no malignancy  - CT chest to eval reticular opacities and poss malignancy

## 2019-03-26 NOTE — H&P ADULT - NSHPREVIEWOFSYSTEMS_GEN_ALL_CORE
REVIEW OF SYSTEMS:  CONSTITUTIONAL: +weakness. No fevers. No chills. No rigors. No weight loss. No poor appetite.  EYES: No visual changes. No eye pain.  ENT: No hearing difficulty. No vertigo. No dysphagia. No Sinusitis/rhinorrhea.  NECK: No pain. No stiffness/rigidity.  CARDIAC: +chest pain. +palpitations.  RESPIRATORY: No cough. +SOB. No hemoptysis.  GASTROINTESTINAL: No abdominal pain. +nausea. +vomiting. No hematemesis. No diarrhea. No constipation. No melena. No hematochezia.  GENITOURINARY: No dysuria. No frequency. No hesitancy. No hematuria.  NEUROLOGICAL: No numbness/tingling. No focal weakness. No incontinence. +headache.  BACK: No back pain.  EXTREMITIES: No lower extremity edema. Full ROM.  SKIN: No rashes. No itching. No other lesions.  PSYCHIATRIC: +depression. +anxiety. No SI/HI.  ALLERGIC: No lip swelling. No hives.  All other review of systems is negative unless indicated above.  Unless indicated above, unable to assess ROS 2/2

## 2019-03-26 NOTE — CONSULT NOTE ADULT - ASSESSMENT
63F c hx CAD (Cath Feb '19 showing 99% RCA, 100% RPLS, 100% Cx) s/p >8 stents/brachytherapy, ESRD (on HD M/T/T/Sa), DM1, CHF (EF ?30% per last [facility]), mod MR/mild AS, TIA, severe PVD s/p b/l fempop bypass, subclavian vein stenosis s/p stent, COPD not on O2, gout, recent admissions for DKA requiring insulin gtt/PNA, presents with sudden onset chest pain and SOB. now with hyperkalemia and chf    1- esrd  2- hyperkalemia  3- DM   4- hypotension       bp is better. + CHF   for high K; hd as treatment.   hd consent obtained witnessed and placed in chart   insulin to cont   shpt cont renvela with meals

## 2019-03-26 NOTE — H&P ADULT - NSHPLABSRESULTS_GEN_ALL_CORE
Personally reviewed labs.   Personally reviewed imaging.   Personally reviewed EKG. NSR, rate 87, . 1mm STD and TWI in II/III/AVF. Q in AVR/V2. TWI are new compared to EKG in Feb '19.                          10.1   5.7   )-----------( 238      ( 25 Mar 2019 22:41 )             29.0       03-26    142  |  96  |  33<H>  ----------------------------<  163<H>  5.9<H>   |  28  |  6.04<H>    Ca    9.4      26 Mar 2019 00:08  Mg     2.3     03-26    TPro  6.7  /  Alb  4.2  /  TBili  0.3  /  DBili  x   /  AST  20  /  ALT  12  /  AlkPhos  69  03-26            LIVER FUNCTIONS - ( 26 Mar 2019 00:08 )  Alb: 4.2 g/dL / Pro: 6.7 g/dL / ALK PHOS: 69 U/L / ALT: 12 U/L / AST: 20 U/L / GGT: x             PT/INR - ( 25 Mar 2019 22:41 )   PT: 12.0 sec;   INR: 1.05 ratio

## 2019-03-26 NOTE — H&P ADULT - NSICDXPASTSURGICALHX_GEN_ALL_CORE_FT
PAST SURGICAL HISTORY:  AV (arteriovenous fistula) Left AV graft; revision with stent placement 2-3 years ago    History of cardiac catheterization 1/2015 - no intervention    S/P cholecystectomy     S/P femoral-popliteal bypass surgery L and R in 2013 with graft; 5/2015 CFA angioplasty left and ileofemoral endarterectomywith vein patch angioplasty of left fem-pop bypass graft  Multiple vascular surgery both leg, left fempop bypass revision 11/2015    Status post device closure of ASD "brenna"

## 2019-03-26 NOTE — ED ADULT NURSE REASSESSMENT NOTE - NS ED NURSE REASSESS COMMENT FT1
pt back from dialysis, no complaints. as per dialysis report pt received 3.5hr of dialysis with 1.6kg of fluid removed, pt tolerated well

## 2019-03-26 NOTE — ED ADULT NURSE REASSESSMENT NOTE - NS ED NURSE REASSESS COMMENT FT1
0700 Report received from night nurse. TBA tele. No bed yet. A&Ox4. IVL intact RACF without sx of infilt. Color pink. skin W&D. Denies chest pain or SOB. Dialysis due today. AV fist left arm +thrill

## 2019-03-26 NOTE — ED ADULT NURSE REASSESSMENT NOTE - NS ED NURSE REASSESS COMMENT FT1
Pt says chest pain has dissipated. NSR on cardiac monitor. No longer hypotensive -- see vitals flowsheet for improvement.

## 2019-03-26 NOTE — H&P ADULT - NSICDXPASTMEDICALHX_GEN_ALL_CORE_FT
PAST MEDICAL HISTORY:  ACS (acute coronary syndrome) 1/2015 - cath revealed 100% ostial stenosis not amenable to PCI - medical management    CAD (coronary artery disease) s/p stents    CHF (congestive heart failure) EF 40-45%    COPD (chronic obstructive pulmonary disease)     CVA (cerebral infarction) with no residual, 8 yrs ago, prior to heart surgery - ST memory loss    Diabetes mellitus type 1 Insulin Dependent -    DKA, type 1 1/2015    ESRD (end stage renal disease) dialysis  M, tue, thursday, saturday    Gout past    Hyperlipidemia     Localized enlarged lymph nodes     Peripheral vascular disease occluded left fem-pop bypass 5/2015    Subclavian vein stenosis, left s/p stent    TIA (transient ischemic attack) x 2 - 8-9 years ago prior to ASD/VSD repair

## 2019-03-26 NOTE — H&P ADULT - PROBLEM SELECTOR PLAN 1
- explains pt's CP and SOB  - elevated trops with EKG changes  - called Dr. Barron who is covering Dr. Vela.  - cont asa, plavix. holding hep gtt until evaluated by cards  - cont pt's metoprolol only  - TTE  - tele  - trend CE

## 2019-03-26 NOTE — ED ADULT NURSE REASSESSMENT NOTE - NS ED NURSE REASSESS COMMENT FT1
NP Mark at bedside. Additional 250cc LR bolus administering. Pt began c/o sternal chest pain. Repeat troponin drawn and sent to lab. EKG performed and given to SARAH Flores.

## 2019-03-26 NOTE — CONSULT NOTE ADULT - SUBJECTIVE AND OBJECTIVE BOX
CHIEF COMPLAINT: chest pain    HISTORY OF PRESENT ILLNESS:  63F c hx CAD (Cath Feb '19 showing 99% RCA, 100% RPLS, 100% Cx) s/p >8 stents/brachytherapy, ESRD (on HD M/T/T/Sa), DM1, CHF (EF ?30% per last [facility]), mod MR/mild AS, TIA, severe PVD s/p b/l fempop bypass, subclavian vein stenosis s/p stent, COPD not on O2, gout, recent admissions for DKA requiring insulin gtt/PNA, presents with sudden onset chest pain and SOB.    At baseline, pt has significant LLE pain 2/2 PVD, but able to ambulate short distances with a cane. Pt reports missing HD yesterday because the dialysis unit was reportedly full. Pt states that around 6pm yesterday while she was cooking dinner, felt sudden onset substernal chest pain, SOB, and palpitations. The chest pain was not radiating, not pleuritic. It lasted a couple hours, before pt decided to come into the hospital. Pt reports her chest pain and SOB seemed resolve at some point overnight, but unable to say how long it lasted. Pt reports having similar CP like this before. Only other symptoms include her chronic LLE>RLE pain, vomiting yesterday. Denies fevers, URI symptoms, leg swelling, orthopnea.      Allergies  No Known Allergies    MEDICATIONS:  aspirin enteric coated 81 milliGRAM(s) Oral daily  clopidogrel Tablet 75 milliGRAM(s) Oral daily  heparin  Injectable 5000 Unit(s) SubCutaneous every 8 hours  metoprolol tartrate 25 milliGRAM(s) Oral two times a day  DULoxetine 60 milliGRAM(s) Oral daily  oxyCODONE    IR 5 milliGRAM(s) Oral every 4 hours PRN  atorvastatin 20 milliGRAM(s) Oral at bedtime  cinacalcet 60 milliGRAM(s) Oral daily  dextrose 40% Gel 15 Gram(s) Oral once PRN  dextrose 50% Injectable 12.5 Gram(s) IV Push once  dextrose 50% Injectable 25 Gram(s) IV Push once  glucagon  Injectable 1 milliGRAM(s) IntraMuscular once PRN  insulin glargine Injectable (LANTUS) 5 Unit(s) SubCutaneous at bedtime  insulin lispro (HumaLOG) corrective regimen sliding scale   SubCutaneous three times a day before meals  insulin lispro (HumaLOG) corrective regimen sliding scale   SubCutaneous at bedtime  dextrose 5%. 1000 milliLiter(s) IV Continuous <Continuous>      PAST MEDICAL & SURGICAL HISTORY:  COPD (chronic obstructive pulmonary disease)  Localized enlarged lymph nodes  CHF (congestive heart failure): EF 40-45%  Subclavian vein stenosis, left: s/p stent  DKA, type 1: 1/2015  ACS (acute coronary syndrome): 1/2015 - cath revealed 100% ostial stenosis not amenable to PCI - medical management  TIA (transient ischemic attack): x 2 - 8-9 years ago prior to ASD/VSD repair  CAD (coronary artery disease): s/p stents  Gout: past  CVA (cerebral infarction): with no residual, 8 yrs ago, prior to heart surgery - ST memory loss  Peripheral vascular disease: occluded left fem-pop bypass 5/2015  Diabetes mellitus type 1: Insulin Dependent -  ESRD (end stage renal disease): dialysis  M, tue, thursday, saturday  Hyperlipidemia  Status post device closure of ASD: &quot;clamshell&quot;  History of cardiac catheterization: 1/2015 - no intervention  S/P femoral-popliteal bypass surgery: L and R in 2013 with graft; 5/2015 CFA angioplasty left and ileofemoral endarterectomywith vein patch angioplasty of left fem-pop bypass graft  Multiple vascular surgery both leg, left fempop bypass revision 11/2015  AV (arteriovenous fistula): Left AV graft; revision with stent placement 2-3 years ago  S/P cholecystectomy      FAMILY HISTORY:  Family history of smoking  Family history of hypertension  Family history of cancer (Sibling)      SOCIAL HISTORY:    former smoker. independent in adl    [ ] Alcohol      REVIEW OF SYSTEMS:  See HPI, otherwise complete 10 point review of systems negative    [ ] All others negative	    PHYSICAL EXAM:  T(C): 36.8 (03-26-19 @ 07:09), Max: 36.8 (03-26-19 @ 07:09)  HR: 79 (03-26-19 @ 09:00) (79 - 87)  BP: 152/68 (03-26-19 @ 09:00) (89/50 - 152/68)  RR: 20 (03-26-19 @ 09:00) (18 - 20)  SpO2: 96% (03-26-19 @ 09:00) (95% - 100%)  Wt(kg): --  I&O's Summary      Appearance: No Acute Distress	  HEENT:  Normal oral mucosa, PERRL, EOMI	  Cardiovascular: Normal S1 S2, No JVD, No murmurs/rubs/gallops  Respiratory: Lungs clear to auscultation bilaterally  Gastrointestinal:  Soft, Non-tender, + BS	  Skin: No rashes, No ecchymoses, No cyanosis	  Neurologic: Non-focal  Extremities: No clubbing, cyanosis or edema  Vascular: Peripheral pulses palpable 2+ bilaterally  Psychiatry: A & O x 3, Mood & affect appropriate    Laboratory Data:	 	    CBC Full  -  ( 25 Mar 2019 22:41 )  WBC Count : 5.7 K/uL  Hemoglobin : 10.1 g/dL  Hematocrit : 29.0 %  Platelet Count - Automated : 238 K/uL  Mean Cell Volume : 104.0 fl  Mean Cell Hemoglobin : 36.0 pg  Mean Cell Hemoglobin Concentration : 34.7 gm/dL  Auto Neutrophil # : x  Auto Lymphocyte # : x  Auto Monocyte # : x  Auto Eosinophil # : x  Auto Basophil # : x  Auto Neutrophil % : x  Auto Lymphocyte % : x  Auto Monocyte % : x  Auto Eosinophil % : x  Auto Basophil % : x    03-26    142  |  96  |  33<H>  ----------------------------<  163<H>  5.9<H>   |  28  |  6.04<H>  03-25    138  |  94<L>  |  33<H>  ----------------------------<  231<H>  6.6<HH>   |  25  |  5.83<H>    Ca    9.4      26 Mar 2019 00:08  Ca    9.4      25 Mar 2019 22:30  Mg     2.3     03-26    TPro  6.7  /  Alb  4.2  /  TBili  0.3  /  DBili  x   /  AST  20  /  ALT  12  /  AlkPhos  69  03-26  TPro  6.9  /  Alb  4.1  /  TBili  0.3  /  DBili  x   /  AST  34  /  ALT  12  /  AlkPhos  71  03-25      proBNP: Serum Pro-Brain Natriuretic Peptide: >53829 pg/mL (03-26 @ 00:08)      EKG: nsr non specific st changes    Assessment:  -hyperkalemia  -lung mass s/p bx  -chest pain  -s/p LHC   -volume overload  -ischemic cardiomyopathy, cad s/p multiple stents  -esrd on hd  -PAD s/p prior intervention   -remote TIA      Recs:  -monitor glucose. c/w insulin regimen  -s/p LHC with Dr Vela 2/20 --> severe and diffuse instent restenosis along RCA --> unable to stent due to multiple layers of stenting and prior brachytherapy. will discuss with dr lidia rutledge whether any intervention is feasible. c/w medical treatment with anti-platelet, statins and anti-anginals for now  -trend trops  -monitor on pratik  -hx of prolonged qtc. avoid qtc prolonging meds  -c/w asa, plavix, statin for ischemic cardiomyopathy/CAD/PAD  -c/w toprol and hydralazine for ischemic cardiomyopathy and HTN   -dvt ppx      Greater than 60 minutes spent on total encounter; more than 50% of the visit was spent counseling and/or coordinating care by the attending physician.   	  Julián Biswas MD   Cardiovascular Diseases  [phone]

## 2019-03-26 NOTE — H&P ADULT - NSICDXFAMILYHX_GEN_ALL_CORE_FT
FAMILY HISTORY:  Family history of hypertension  Family history of smoking    Sibling  Still living? Yes, Estimated age: Age Unknown  Family history of cancer, Age at diagnosis: Age Unknown

## 2019-03-26 NOTE — H&P ADULT - PROBLEM SELECTOR PLAN 6
- pt reportedly makes only a little urine  - cont home lasix - pt reportedly makes only a little urine  - holding lasix 2/2 hypotension that was fluid responsive

## 2019-03-26 NOTE — H&P ADULT - HISTORY OF PRESENT ILLNESS
63F c hx CAD (Cath Feb '19 showing 99% RCA, 100% RPLS, 100% Cx) s/p >8 stents/brachytherapy, ESRD (on HD M/T/T/Sa), DM1, CHF (EF ?30% per last [facility]), mod MR/mild AS, TIA, severe PVD s/p b/l fempop bypass, subclavian vein stenosis s/p stent, COPD not on O2, gout, recent admissions for DKA requiring insulin gtt/PNA, presents with sudden onset chest pain and SOB.    At baseline, pt has significant LLE pain 2/2 PVD, but able to ambulate short distances with a cane. Pt reports missing HD yesterday because the dialysis unit was reportedly full. Pt states that around 6pm yesterday while she was cooking dinner, felt sudden onset substernal chest pain, SOB, and palpitations. The chest pain was not radiating, not pleuritic. It lasted a couple hours, before pt decided to come into the hospital. Pt reports her chest pain and SOB seemed resolve at some point overnight, but unable to say how long it lasted. Pt reports having similar CP like this before. Only other symptoms include her chronic LLE>RLE pain, vomiting yesterday. Denies fevers, URI symptoms, leg swelling, orthopnea.    VS: Tm 98, P 84, BP 89/50, R 20, >95% on RA  In the ED, received LR 1.5L, Lantus 5, Kayexalate.

## 2019-03-26 NOTE — H&P ADULT - ASSESSMENT
63F c hx CAD (Cath Feb '19 showing 99% RCA, 100% RPLS, 100% Cx) s/p >8 stents/brachytherapy, ESRD (on HD M/T/T/Sa), DM1, CHF (EF ?30% per last [facility]), mod MR/mild AS, TIA, severe PVD s/p b/l fempop bypass, subclavian vein stenosis s/p stent, COPD not on O2, gout, recent admissions for DKA requiring insulin gtt/PNA, pw NSTEMI, hyperkalemia, and hypotension of unclear etiology,

## 2019-03-26 NOTE — H&P ADULT - NSHPSOCIALHISTORY_GEN_ALL_CORE
Social History:    Marital Status: ( x ) , (  ) Single, (  ) , (  ) , (  )   # of Children: 1 son, 1 daughter  Lives with: (  ) alone, ( x ) children, ( x ) spouse, (  ) parents, (  ) siblings, (  ) friends, (  ) other:   Occupation:     Substance Use/Illicit Drugs: (  ) never used vs other:   Tobacco Usage: (  ) never smoked, ( x ) former smoker, (  ) current smoker and Total Pack-Years: 40 pk yrs  Last Alcohol Usage/Frequency/Amount/Withdrawal/Hx of Abuse:  age 20.  Foreign travel/Animal exposure:

## 2019-03-26 NOTE — H&P ADULT - PROBLEM SELECTOR PLAN 3
Please contact Dr Hernandez if any symptoms change, worsen or any new issues arise.     Please register, if you haven't already, onto the Patient Portal to enhance communications with Dr. Hernandez and his office.    Any lab and other testing results will be conveyed to you within a few days via this portal(or via phone if we agreed to do so).     Please contact us if you don't receive your results within one week from your visit.     We appreciate you completing and returning your Patient Satisfaction Survey to insure we are delivering you excellent services.     
HD as per renal, aware, HD orders in.

## 2019-03-26 NOTE — H&P ADULT - PROBLEM SELECTOR PLAN 2
- s/p kayexalate and insulin. no peaked t waves or widened QRS  - primary team will need to call renal this AM for HD  - tele

## 2019-03-26 NOTE — CONSULT NOTE ADULT - SUBJECTIVE AND OBJECTIVE BOX
[facility]  [phone]  NEPHROLOGY      INITIAL CONSULT NOTE  --------------------------------------------------------------------------------  HPI:    63F c hx CAD (Cath Feb '19 showing 99% RCA, 100% RPLS, 100% Cx) s/p >8 stents/brachytherapy, ESRD (on HD M/T/T/Sa), DM1, CHF (EF ?30% per last [facility]), mod MR/mild AS, TIA, severe PVD s/p b/l fempop bypass, subclavian vein stenosis s/p stent, COPD not on O2, gout, recent admissions for DKA requiring insulin gtt/PNA, presents with sudden onset chest pain and SOB.  At baseline, pt has significant LLE pain 2/2 PVD, but able to ambulate short distances with a cane. Pt reports missing HD yesterday because the dialysis unit was reportedly full. Pt states that around 6pm yesterday while she was cooking dinner, felt sudden onset substernal chest pain, SOB, and palpitations. The chest pain was not radiating, not pleuritic. It lasted a couple hours, before pt decided to come into the hospital. noticed with hyperkalemia. last hd 3 d ago. was also hypotensive In the ED, received LR 1.5L, Lantus 5, Kayexalate.     PAST HISTORY  --------------------------------------------------------------------------------  PAST MEDICAL & SURGICAL HISTORY:  COPD (chronic obstructive pulmonary disease)  Localized enlarged lymph nodes  CHF (congestive heart failure): EF 40-45%  Subclavian vein stenosis, left: s/p stent  DKA, type 1: 1/2015  ACS (acute coronary syndrome): 1/2015 - cath revealed 100% ostial stenosis not amenable to PCI - medical management  TIA (transient ischemic attack): x 2 - 8-9 years ago prior to ASD/VSD repair  CAD (coronary artery disease): s/p stents  Gout: past  CVA (cerebral infarction): with no residual, 8 yrs ago, prior to heart surgery - ST memory loss  Peripheral vascular disease: occluded left fem-pop bypass 5/2015  Diabetes mellitus type 1: Insulin Dependent -  ESRD (end stage renal disease): dialysis  M, tue, thursday, saturday  Hyperlipidemia  Status post device closure of ASD: &quot;clamshell&quot;  History of cardiac catheterization: 1/2015 - no intervention  S/P femoral-popliteal bypass surgery: L and R in 2013 with graft; 5/2015 CFA angioplasty left and ileofemoral endarterectomywith vein patch angioplasty of left fem-pop bypass graft  Multiple vascular surgery both leg, left fempop bypass revision 11/2015  AV (arteriovenous fistula): Left AV graft; revision with stent placement 2-3 years ago  S/P cholecystectomy    FAMILY HISTORY:  Family history of smoking  Family history of hypertension  Family history of cancer (Sibling)    PAST SOCIAL HISTORY: hx past tobacco use     ALLERGIES & MEDICATIONS  --------------------------------------------------------------------------------  Allergies    No Known Allergies    Intolerances      Standing Inpatient Medications  aspirin enteric coated 81 milliGRAM(s) Oral daily  atorvastatin 20 milliGRAM(s) Oral at bedtime  cinacalcet 60 milliGRAM(s) Oral daily  clopidogrel Tablet 75 milliGRAM(s) Oral daily  dextrose 5%. 1000 milliLiter(s) IV Continuous <Continuous>  dextrose 50% Injectable 12.5 Gram(s) IV Push once  dextrose 50% Injectable 25 Gram(s) IV Push once  DULoxetine 60 milliGRAM(s) Oral daily  heparin  Injectable 5000 Unit(s) SubCutaneous every 8 hours  insulin glargine Injectable (LANTUS) 5 Unit(s) SubCutaneous at bedtime  insulin lispro (HumaLOG) corrective regimen sliding scale   SubCutaneous three times a day before meals  insulin lispro (HumaLOG) corrective regimen sliding scale   SubCutaneous at bedtime  metoprolol tartrate 25 milliGRAM(s) Oral two times a day  sevelamer carbonate 800 milliGRAM(s) Oral three times a day with meals    PRN Inpatient Medications  dextrose 40% Gel 15 Gram(s) Oral once PRN  glucagon  Injectable 1 milliGRAM(s) IntraMuscular once PRN  oxyCODONE    IR 5 milliGRAM(s) Oral every 4 hours PRN      REVIEW OF SYSTEMS  --------------------------------------------------------------------------------  Gen: No  fevers/chills   Skin: No rashes  Head/Eyes/Ears/Mouth: No headache; Normal hearing;  No sinus pain/discomfort, sore throat  Respiratory: +  dyspnea, cough, wheezing,  CV: No chest pain, orthopnea +  GI: No abdominal pain, diarrhea, + nausea, + vomiting,   : No dysuria,   MSK: No joint pain/swelling; no back pain  Neuro: No dizziness/lightheadedness,     also with LE  edema     All other systems were reviewed and are negative, except as noted.    VITALS/PHYSICAL EXAM  --------------------------------------------------------------------------------  T(C): 36.5 (03-26-19 @ 13:10), Max: 36.8 (03-26-19 @ 07:09)  HR: 79 (03-26-19 @ 14:00) (78 - 87)  BP: 139/87 (03-26-19 @ 14:00) (89/50 - 152/68)  RR: 20 (03-26-19 @ 14:00) (18 - 20)  SpO2: 100% (03-26-19 @ 14:00) (95% - 100%)  Wt(kg): --        Physical Exam:  	Gen: chronically ill appearing F   	+  jvd ,   	Pulm: decrease bs  no rales or ronchi or wheezing  	CV: RRR, S1S2; no rub  	Abd: +BS, soft, nontender/nondistended  	: No suprapubic tenderness  	UE: Warm, no cyanosis  no clubbing,  no edema; no asterixis  	LE: Warm, no cyanosis  no clubbing, 2+ LLE > RLE edema  	Neuro: alert and oriented. speech coherent   	Skin: Warm, no decrease skin turgor       LABS/STUDIES  --------------------------------------------------------------------------------              10.1   5.7   >-----------<  238      [03-25-19 @ 22:41]              29.0     142  |  96  |  33  ----------------------------<  163      [03-26-19 @ 00:08]  5.9   |  28  |  6.04        Ca     9.4     [03-26-19 @ 00:08]      Mg     2.3     [03-26-19 @ 00:08]    TPro  6.7  /  Alb  4.2  /  TBili  0.3  /  DBili  x   /  AST  20  /  ALT  12  /  AlkPhos  69  [03-26-19 @ 00:08]    PT/INR: PT 12.0 , INR 1.05       [03-25-19 @ 22:41]      Creatinine Trend:  SCr 6.04 [03-26 @ 00:08]  SCr 5.83 [03-25 @ 22:30]  SCr 2.96 [03-05 @ 12:34]  SCr 5.00 [03-05 @ 06:15]  SCr 3.45 [03-04 @ 05:29]    Urinalysis - [06-04-17 @ 08:24]      Color Yellow / Appearance Clear / SG 1.013 / pH 8.5      Gluc 1000 / Ketone Negative  / Bili Negative / Urobili Negative       Blood Negative / Protein >600 / Leuk Est Small / Nitrite Negative      RBC 3-5 / WBC 6-10 / Hyaline  / Gran  / Sq Epi  / Non Sq Epi OCC / Bacteria       PTH -- (Ca 7.3)      [02-22-19 @ 02:49]   59  HbA1c 7.9      [02-27-19 @ 11:08]  TSH 0.71      [11-17-18 @ 08:25]  Lipid: chol 113, TG 86, HDL 59, LDL 37      [04-30-18 @ 06:44]    HBsAb <3.0      [03-01-19 @ 23:50]  HBsAb Nonreact      [05-02-15 @ 15:35]  HBsAg NEGATIVE      [03-01-19 @ 23:50]  HBcAb Nonreact      [12-18-18 @ 02:39]  HCV 0.11, Nonreactive Hepatitis C AB  S/CO Ratio                        Interpretation  < 1.00                                   Non-Reactive  1.00 - 4.99                         Weakly-Reactive  > 5.00                                Reactive  Non-Reactive: A person with a non-reactive HCV antibody  result is considered uninfected.  No further action is  needed unless recent infection is suspected.  In these  cases, consider repeat testing later to detect  seroconversion..  Weakly-Reactive: HCV antibody test is abnormal, HCV RNA  Qualitative test will follow.  Reactive: HCV antibody test is abnormal, HCV RNA  Qualitative test will follow.  Note: HCV antibody testing is performed on the [employer]   system.      [03-01-19 @ 23:50]        < from: CT Chest No Cont (03.26.19 @ 08:05) >    EXAM:  CT CHEST                            PROCEDURE DATE:  03/26/2019            INTERPRETATION:  CLINICAL INFORMATION: Shortness of breath; evaluate for   pulmonary edema malignancy    COMPARISON: CT chest dated 1/23/2019 and 2/11/2019    PROCEDURE:   CT of the Chest was performed without intravenous contrast.  Sagittal and coronal reformats were performed.      FINDINGS:    CHEST:     LUNGS AND LARGE AIRWAYS: Right greater than left interlobular septal   thickening is again noted. Previouslynoted groundglass opacities are   decreased in number and are likely infectious in etiology.  PLEURA: Trace right pleural effusion.  VESSELS: Left subclavian stent. Atheromatous calcifications of the   thoracic aorta and coronary arteries.  HEART: Heart size is normal. No pericardial effusion. Aortic valvular and   mitral annular calcifications.  MEDIASTINUM AND SANDOVAL: Difficult to delineate bulky mediastinal and   bilateral hilar lymphadenopathy is again noted. Foci of air within the   right upper paratracheal lymph node likely related to recent biopsy.  CHEST WALL AND LOWER NECK: Within normal limits.  VISUALIZED UPPER ABDOMEN: Atrophic left kidney. Cholecystectomy.  BONES: Chronic posterior right seventh rib fracture deformity.    IMPRESSION:   Difficult to delineate bulky mediastinal and bilateral hilar   lymphadenopathy .    Persistent right greater than left interlobular septal thickening   suggestive of asymmetric pulmonary edema.    Interval decrease in number of bilateral groundglass opacities which   likely were infectious.                  JOHANNA FOY M.D., RADIOLOGY RESIDENT  This document has been electronically signed.  DOROTHEA BISHOP M.D., ATTENDING RADIOLOGIST  This document has been electronically signed. Mar 26 [id number]:[id number]        < end of copied text >

## 2019-03-26 NOTE — ED ADULT NURSE REASSESSMENT NOTE - NS ED NURSE REASSESS COMMENT FT1
Pt ambulated to bathroom without assistance.  Upon return, pt c/o dizziness, weakness. Wheeled back to bed. Vitals assessed and pt was hypotensive 88/52. Systolic dropped to low 80s. SAARH Flores called and came to bedside to assess pt at 0335. Fingerstick was 313.

## 2019-03-26 NOTE — CHART NOTE - NSCHARTNOTEFT_GEN_A_CORE
Notified by RN that patient c/o lightheadedness and generalized weakness and  noted to be hypotensive  B/p 80/55. Patient seen and examined at bedside, c/o midsternal chest pain and generalized weakness. Denies palpitation, dizziness, abdominal pain, N/V, fever, chills.  Patient is a 63y old  Female who presents  to ED with a chief complaint of shortness of breath    Vital Signs Last 24 Hrs  T(C): 36.4 (26 Mar 2019 03:54), Max: 36.7 (25 Mar 2019 21:53)  T(F): 97.5 (26 Mar 2019 03:54), Max: 98 (25 Mar 2019 21:53)  HR: 84 (26 Mar 2019 04:21) (83 - 86)  BP: 96/54 (26 Mar 2019 04:21) (89/50 - 132/70)  BP(mean): --  RR: 20 (26 Mar 2019 04:21) (18 - 20)  SpO2: 95% (26 Mar 2019 04:21) (95% - 100%)      Labs:                          10.1   5.7   )-----------( 238      ( 25 Mar 2019 22:41 )             29.0     03-26    142  |  96  |  33<H>  ----------------------------<  163<H>  5.9<H>   |  28  |  6.04<H>    Ca    9.4      26 Mar 2019 00:08  Mg     2.3     03-26    TPro  6.7  /  Alb  4.2  /  TBili  0.3  /  DBili  x   /  AST  20  /  ALT  12  /  AlkPhos  69  03-26        Radiology: CXR: Right lower lung atelectasis. Bilateral reticular   opacities, greater on the right, which may reflect interstitial edema.        Physical Exam:  General: AOx3, non-toxic appearing  Neurology:No focal sensory or motor deficits  Head:  Normocephalic, atraumatic  Respiratory: CTA B/L  CV: RRR, S1S2, no murmurs, rubs or gallops  Abdominal: Soft, NT, ND no palpable mass  MSK: No edema, + peripheral pulses, FROM all 4 extremity  Skin: Warm, well perfused, no rash      Assessment & Plan:  HPI:  63y female PMH of Former Smoker, CAD w ptca #7 Stents, CHF,COPD,CVA,DM,Gout, HLD,ESRD on HD (Tu,Th,Sa,M) Gout, PVD with maribell Fem Pop bypass, Subclavian ASD/VSD Repair. Patient presents to ED with c/o SOB  Now presents with hypotension, chest pain and generalized weakness    Impression:  Chest pain with Hypotension    >Normal saline 250 Bolus IV x1  >Stat CXR  >STAT EKG  > Oxygen 2LN/C  > Hs Troponin x1  > CBC, BMP   > Telemetry  > Will consider MICU consult if no improvement in B/p  > Case discussed with Dr Mondragon  > Follow up with Attending in AM.    Mark Rey, ANP-C  Department of Medicine  [id number] Notified by RN that patient c/o lightheadedness and generalized weakness and  noted to be hypotensive  B/p 80/55. Patient seen and examined at bedside, c/o midsternal chest pain and generalized weakness. Denies palpitation, dizziness, abdominal pain, N/V, fever, chills.  Patient is a 63y old  Female who presents  to ED with a chief complaint of shortness of breath    Vital Signs Last 24 Hrs  T(C): 36.4 (26 Mar 2019 03:54), Max: 36.7 (25 Mar 2019 21:53)  T(F): 97.5 (26 Mar 2019 03:54), Max: 98 (25 Mar 2019 21:53)  HR: 84 (26 Mar 2019 04:21) (83 - 86)  BP: 96/54 (26 Mar 2019 04:21) (89/50 - 132/70)  BP(mean): --  RR: 20 (26 Mar 2019 04:21) (18 - 20)  SpO2: 95% (26 Mar 2019 04:21) (95% - 100%)    Labs:             5.7   )-----------( 238      ( 25 Mar 2019 22:41 )             29.0     03-26    142  |  96  |  33<H>  ----------------------------<  163<H>  5.9<H>   |  28  |  6.04<H>    Ca    9.4      26 Mar 2019 00:08  Mg     2.3     03-26    TPro  6.7  /  Alb  4.2  /  TBili  0.3  /  DBili  x   /  AST  20  /  ALT  12  /  AlkPhos  69  03-26    Radiology: CXR: Right lower lung atelectasis. Bilateral reticular   opacities, greater on the right, which may reflect interstitial edema.    Physical Exam:  General: AOx3, non-toxic appearing  Neurology:No focal sensory or motor deficits  Head:  Normocephalic, atraumatic  Respiratory: CTA B/L  CV: RRR, S1S2, no murmurs, rubs or gallops  Abdominal: Soft, NT, ND no palpable mass  MSK: No edema, + peripheral pulses, FROM all 4 extremity  Skin: Warm, well perfused, no rash    Assessment & Plan:  HPI:  63y female PMH of Former Smoker, CAD w ptca #7 Stents, CHF,COPD,CVA,DM,Gout, HLD,ESRD on HD (Tu,Th,Sa,M) Gout, PVD with maribell Fem Pop bypass, Subclavian ASD/VSD Repair. Patient presents to ED with c/o SOB  Now presents with hypotension, chest pain and generalized weakness    Impression:  Chest pain with Hypotension    >Normal saline 250 Bolus IV x1  >Stat CXR  >STAT EKG  > Oxygen 2LN/C  > Hs Troponin x1  > CBC, BMP   > Telemetry  > Will consider MICU consult if no improvement in B/p  > Case discussed with Dr Mondragon  > Follow up with Attending in AM.    Mark Rey, ANP-C  Department of Medicine  [id number] Notified by RN that patient c/o lightheadedness and generalized weakness and  noted to be hypotensive  B/p 80/55. Patient seen and examined at bedside, c/o midsternal chest pain and generalized weakness. Denies palpitation, dizziness, abdominal pain, N/V, fever, chills.  Patient is a 63y old  Female who presents  to ED with a chief complaint of shortness of breath    Vital Signs Last 24 Hrs  T(C): 36.4 (26 Mar 2019 03:54), Max: 36.7 (25 Mar 2019 21:53)  T(F): 97.5 (26 Mar 2019 03:54), Max: 98 (25 Mar 2019 21:53)  HR: 84 (26 Mar 2019 04:21) (83 - 86)  BP: 96/54 (26 Mar 2019 04:21) (89/50 - 132/70)  BP(mean): --  RR: 20 (26 Mar 2019 04:21) (18 - 20)  SpO2: 95% (26 Mar 2019 04:21) (95% - 100%)    Labs:             5.7   )-----------( 238      ( 25 Mar 2019 22:41 )             29.0     03-26    142  |  96  |  33<H>  ----------------------------<  163<H>  5.9<H>   |  28  |  6.04<H>    Ca    9.4      26 Mar 2019 00:08  Mg     2.3     03-26    TPro  6.7  /  Alb  4.2  /  TBili  0.3  /  DBili  x   /  AST  20  /  ALT  12  /  AlkPhos  69  03-26    Radiology: CXR: Right lower lung atelectasis. Bilateral reticular   opacities, greater on the right, which may reflect interstitial edema.    Physical Exam:  General: AOx3, non-toxic appearing  Neurology:No focal sensory or motor deficits  Head:  Normocephalic, atraumatic  Respiratory: CTA B/L  CV: RRR, S1S2, no murmurs, rubs or gallops  Abdominal: Soft, NT, ND no palpable mass  MSK: No edema, + peripheral pulses, FROM all 4 extremity  Skin: Warm, well perfused, no rash    Assessment & Plan:  HPI:  63y female PMH of Former Smoker, CAD w ptca #7 Stents, CHF,COPD,CVA,DM,Gout, HLD,ESRD on HD (Tu,Th,Sa,M) Gout, PVD with maribell Fem Pop bypass, Subclavian ASD/VSD Repair. Patient presents to ED with c/o SOB  Now presents with hypotension, chest pain and generalized weakness    Impression:  Chest pain with Hypotension    >Normal saline 250 Bolus IV x1  >Stat CXR  >STAT EKG  > Oxygen 2LN/C  > Hs Troponin x1  > CBC, BMP   > Telemetry  > Will consider MICU consult if no improvement in B/p  > Case discussed with Dr Mondragon  > Follow up with Attending in AM.  Addendum: B/p now 107/56, patient appears more alert .    Mark Rey, ANP-C  Department of Medicine  [id number]

## 2019-03-27 LAB
ANION GAP SERPL CALC-SCNC: 16 MMOL/L — SIGNIFICANT CHANGE UP (ref 5–17)
BUN SERPL-MCNC: 14 MG/DL — SIGNIFICANT CHANGE UP (ref 7–23)
CALCIUM SERPL-MCNC: 8.6 MG/DL — SIGNIFICANT CHANGE UP (ref 8.4–10.5)
CHLORIDE SERPL-SCNC: 96 MMOL/L — SIGNIFICANT CHANGE UP (ref 96–108)
CO2 SERPL-SCNC: 27 MMOL/L — SIGNIFICANT CHANGE UP (ref 22–31)
CREAT SERPL-MCNC: 3.69 MG/DL — HIGH (ref 0.5–1.3)
GLUCOSE BLDC GLUCOMTR-MCNC: 106 MG/DL — HIGH (ref 70–99)
GLUCOSE BLDC GLUCOMTR-MCNC: 195 MG/DL — HIGH (ref 70–99)
GLUCOSE BLDC GLUCOMTR-MCNC: 322 MG/DL — HIGH (ref 70–99)
GLUCOSE BLDC GLUCOMTR-MCNC: 94 MG/DL — SIGNIFICANT CHANGE UP (ref 70–99)
GLUCOSE SERPL-MCNC: 133 MG/DL — HIGH (ref 70–99)
HBV CORE AB SER-ACNC: SIGNIFICANT CHANGE UP
HBV SURFACE AB SER-ACNC: <3 MIU/ML — LOW
HBV SURFACE AB SER-ACNC: SIGNIFICANT CHANGE UP
HBV SURFACE AG SER-ACNC: SIGNIFICANT CHANGE UP
HCT VFR BLD CALC: 28.8 % — LOW (ref 34.5–45)
HCV AB S/CO SERPL IA: 0.09 S/CO — SIGNIFICANT CHANGE UP (ref 0–0.79)
HCV AB SERPL-IMP: SIGNIFICANT CHANGE UP
HGB BLD-MCNC: 9.8 G/DL — LOW (ref 11.5–15.5)
MAGNESIUM SERPL-MCNC: 2.2 MG/DL — SIGNIFICANT CHANGE UP (ref 1.6–2.6)
MCHC RBC-ENTMCNC: 33.9 GM/DL — SIGNIFICANT CHANGE UP (ref 32–36)
MCHC RBC-ENTMCNC: 36.1 PG — HIGH (ref 27–34)
MCV RBC AUTO: 107 FL — HIGH (ref 80–100)
PHOSPHATE SERPL-MCNC: 4 MG/DL — SIGNIFICANT CHANGE UP (ref 2.5–4.5)
PLATELET # BLD AUTO: 206 K/UL — SIGNIFICANT CHANGE UP (ref 150–400)
POTASSIUM SERPL-MCNC: 4.8 MMOL/L — SIGNIFICANT CHANGE UP (ref 3.5–5.3)
POTASSIUM SERPL-SCNC: 4.8 MMOL/L — SIGNIFICANT CHANGE UP (ref 3.5–5.3)
RBC # BLD: 2.7 M/UL — LOW (ref 3.8–5.2)
RBC # FLD: 14.6 % — HIGH (ref 10.3–14.5)
SODIUM SERPL-SCNC: 139 MMOL/L — SIGNIFICANT CHANGE UP (ref 135–145)
WBC # BLD: 3.8 K/UL — SIGNIFICANT CHANGE UP (ref 3.8–10.5)
WBC # FLD AUTO: 3.8 K/UL — SIGNIFICANT CHANGE UP (ref 3.8–10.5)

## 2019-03-27 RX ORDER — INSULIN GLARGINE 100 [IU]/ML
3 INJECTION, SOLUTION SUBCUTANEOUS AT BEDTIME
Qty: 0 | Refills: 0 | Status: DISCONTINUED | OUTPATIENT
Start: 2019-03-27 | End: 2019-03-28

## 2019-03-27 RX ORDER — INSULIN LISPRO 100/ML
2 VIAL (ML) SUBCUTANEOUS
Qty: 0 | Refills: 0 | Status: DISCONTINUED | OUTPATIENT
Start: 2019-03-27 | End: 2019-03-28

## 2019-03-27 RX ORDER — ACETAMINOPHEN 500 MG
1000 TABLET ORAL ONCE
Qty: 0 | Refills: 0 | Status: COMPLETED | OUTPATIENT
Start: 2019-03-27 | End: 2019-03-27

## 2019-03-27 RX ORDER — ERYTHROPOIETIN 10000 [IU]/ML
10000 INJECTION, SOLUTION INTRAVENOUS; SUBCUTANEOUS ONCE
Qty: 0 | Refills: 0 | Status: COMPLETED | OUTPATIENT
Start: 2019-03-27 | End: 2019-03-27

## 2019-03-27 RX ADMIN — SEVELAMER CARBONATE 800 MILLIGRAM(S): 2400 POWDER, FOR SUSPENSION ORAL at 18:03

## 2019-03-27 RX ADMIN — OXYCODONE HYDROCHLORIDE 5 MILLIGRAM(S): 5 TABLET ORAL at 02:03

## 2019-03-27 RX ADMIN — ERYTHROPOIETIN 10000 UNIT(S): 10000 INJECTION, SOLUTION INTRAVENOUS; SUBCUTANEOUS at 10:34

## 2019-03-27 RX ADMIN — Medication 2: at 21:44

## 2019-03-27 RX ADMIN — OXYCODONE HYDROCHLORIDE 5 MILLIGRAM(S): 5 TABLET ORAL at 18:30

## 2019-03-27 RX ADMIN — CLOPIDOGREL BISULFATE 75 MILLIGRAM(S): 75 TABLET, FILM COATED ORAL at 14:58

## 2019-03-27 RX ADMIN — Medication 1: at 18:23

## 2019-03-27 RX ADMIN — SEVELAMER CARBONATE 800 MILLIGRAM(S): 2400 POWDER, FOR SUSPENSION ORAL at 15:43

## 2019-03-27 RX ADMIN — OXYCODONE HYDROCHLORIDE 5 MILLIGRAM(S): 5 TABLET ORAL at 18:00

## 2019-03-27 RX ADMIN — Medication 400 MILLIGRAM(S): at 20:31

## 2019-03-27 RX ADMIN — ATORVASTATIN CALCIUM 20 MILLIGRAM(S): 80 TABLET, FILM COATED ORAL at 21:15

## 2019-03-27 RX ADMIN — Medication 81 MILLIGRAM(S): at 14:58

## 2019-03-27 RX ADMIN — OXYCODONE HYDROCHLORIDE 5 MILLIGRAM(S): 5 TABLET ORAL at 02:33

## 2019-03-27 RX ADMIN — SEVELAMER CARBONATE 800 MILLIGRAM(S): 2400 POWDER, FOR SUSPENSION ORAL at 11:15

## 2019-03-27 RX ADMIN — Medication 25 MILLIGRAM(S): at 18:03

## 2019-03-27 RX ADMIN — CINACALCET 60 MILLIGRAM(S): 30 TABLET, FILM COATED ORAL at 14:59

## 2019-03-27 RX ADMIN — Medication 1000 MILLIGRAM(S): at 20:45

## 2019-03-27 RX ADMIN — DULOXETINE HYDROCHLORIDE 60 MILLIGRAM(S): 30 CAPSULE, DELAYED RELEASE ORAL at 14:58

## 2019-03-27 RX ADMIN — INSULIN GLARGINE 5 UNIT(S): 100 INJECTION, SOLUTION SUBCUTANEOUS at 21:45

## 2019-03-27 NOTE — PROVIDER CONTACT NOTE (OTHER) - ASSESSMENT
Patient Aox4. patient was sleeping at the time of the event. Patient denies any distress. VSS.  Patient on metoprolol 25mg PO twice a day. Patient Aox4. patient was sleeping at the time of the event. Patient denies any distress. VSS.  Patient on metoprolol 25mg PO twice a day. Patient mostly SR 70-80 overnight.

## 2019-03-27 NOTE — PROVIDER CONTACT NOTE (OTHER) - BACKGROUND
Patient admitted for SOB. Dx with Heart failure. Hx of former smoker, CAD, COPD, CVA, DM, PVD and subclavian ASD/VSD repair. s/p HD treatment 3/26

## 2019-03-28 DIAGNOSIS — I10 ESSENTIAL (PRIMARY) HYPERTENSION: ICD-10-CM

## 2019-03-28 DIAGNOSIS — E78.49 OTHER HYPERLIPIDEMIA: ICD-10-CM

## 2019-03-28 LAB
ANION GAP SERPL CALC-SCNC: 14 MMOL/L — SIGNIFICANT CHANGE UP (ref 5–17)
BUN SERPL-MCNC: 10 MG/DL — SIGNIFICANT CHANGE UP (ref 7–23)
CALCIUM SERPL-MCNC: 8.4 MG/DL — SIGNIFICANT CHANGE UP (ref 8.4–10.5)
CHLORIDE SERPL-SCNC: 97 MMOL/L — SIGNIFICANT CHANGE UP (ref 96–108)
CO2 SERPL-SCNC: 27 MMOL/L — SIGNIFICANT CHANGE UP (ref 22–31)
CREAT SERPL-MCNC: 3.1 MG/DL — HIGH (ref 0.5–1.3)
GLUCOSE BLDC GLUCOMTR-MCNC: 123 MG/DL — HIGH (ref 70–99)
GLUCOSE BLDC GLUCOMTR-MCNC: 139 MG/DL — HIGH (ref 70–99)
GLUCOSE BLDC GLUCOMTR-MCNC: 95 MG/DL — SIGNIFICANT CHANGE UP (ref 70–99)
GLUCOSE BLDC GLUCOMTR-MCNC: 99 MG/DL — SIGNIFICANT CHANGE UP (ref 70–99)
GLUCOSE SERPL-MCNC: 183 MG/DL — HIGH (ref 70–99)
MAGNESIUM SERPL-MCNC: 2 MG/DL — SIGNIFICANT CHANGE UP (ref 1.6–2.6)
POTASSIUM SERPL-MCNC: 4.4 MMOL/L — SIGNIFICANT CHANGE UP (ref 3.5–5.3)
POTASSIUM SERPL-SCNC: 4.4 MMOL/L — SIGNIFICANT CHANGE UP (ref 3.5–5.3)
SODIUM SERPL-SCNC: 138 MMOL/L — SIGNIFICANT CHANGE UP (ref 135–145)

## 2019-03-28 PROCEDURE — 99223 1ST HOSP IP/OBS HIGH 75: CPT | Mod: GC

## 2019-03-28 RX ORDER — METOPROLOL TARTRATE 50 MG
50 TABLET ORAL DAILY
Qty: 0 | Refills: 0 | Status: DISCONTINUED | OUTPATIENT
Start: 2019-03-29 | End: 2019-04-04

## 2019-03-28 RX ORDER — METOPROLOL TARTRATE 50 MG
25 TABLET ORAL ONCE
Qty: 0 | Refills: 0 | Status: COMPLETED | OUTPATIENT
Start: 2019-03-28 | End: 2019-03-28

## 2019-03-28 RX ORDER — SIMETHICONE 80 MG/1
80 TABLET, CHEWABLE ORAL ONCE
Qty: 0 | Refills: 0 | Status: COMPLETED | OUTPATIENT
Start: 2019-03-28 | End: 2019-03-28

## 2019-03-28 RX ORDER — INSULIN GLARGINE 100 [IU]/ML
7 INJECTION, SOLUTION SUBCUTANEOUS AT BEDTIME
Qty: 0 | Refills: 0 | Status: DISCONTINUED | OUTPATIENT
Start: 2019-03-28 | End: 2019-03-29

## 2019-03-28 RX ORDER — OXYCODONE AND ACETAMINOPHEN 5; 325 MG/1; MG/1
1 TABLET ORAL EVERY 4 HOURS
Qty: 0 | Refills: 0 | Status: DISCONTINUED | OUTPATIENT
Start: 2019-03-28 | End: 2019-04-04

## 2019-03-28 RX ORDER — INSULIN LISPRO 100/ML
3 VIAL (ML) SUBCUTANEOUS
Qty: 0 | Refills: 0 | Status: DISCONTINUED | OUTPATIENT
Start: 2019-03-28 | End: 2019-04-01

## 2019-03-28 RX ADMIN — ATORVASTATIN CALCIUM 20 MILLIGRAM(S): 80 TABLET, FILM COATED ORAL at 21:16

## 2019-03-28 RX ADMIN — DULOXETINE HYDROCHLORIDE 60 MILLIGRAM(S): 30 CAPSULE, DELAYED RELEASE ORAL at 12:14

## 2019-03-28 RX ADMIN — Medication 2 UNIT(S): at 13:23

## 2019-03-28 RX ADMIN — INSULIN GLARGINE 7 UNIT(S): 100 INJECTION, SOLUTION SUBCUTANEOUS at 22:19

## 2019-03-28 RX ADMIN — SEVELAMER CARBONATE 800 MILLIGRAM(S): 2400 POWDER, FOR SUSPENSION ORAL at 12:14

## 2019-03-28 RX ADMIN — Medication 3 UNIT(S): at 18:19

## 2019-03-28 RX ADMIN — Medication 2 UNIT(S): at 09:32

## 2019-03-28 RX ADMIN — CINACALCET 60 MILLIGRAM(S): 30 TABLET, FILM COATED ORAL at 12:14

## 2019-03-28 RX ADMIN — SEVELAMER CARBONATE 800 MILLIGRAM(S): 2400 POWDER, FOR SUSPENSION ORAL at 09:32

## 2019-03-28 RX ADMIN — OXYCODONE AND ACETAMINOPHEN 1 TABLET(S): 5; 325 TABLET ORAL at 13:30

## 2019-03-28 RX ADMIN — Medication 25 MILLIGRAM(S): at 18:19

## 2019-03-28 RX ADMIN — OXYCODONE AND ACETAMINOPHEN 1 TABLET(S): 5; 325 TABLET ORAL at 21:45

## 2019-03-28 RX ADMIN — OXYCODONE AND ACETAMINOPHEN 1 TABLET(S): 5; 325 TABLET ORAL at 14:00

## 2019-03-28 RX ADMIN — CLOPIDOGREL BISULFATE 75 MILLIGRAM(S): 75 TABLET, FILM COATED ORAL at 12:14

## 2019-03-28 RX ADMIN — SEVELAMER CARBONATE 800 MILLIGRAM(S): 2400 POWDER, FOR SUSPENSION ORAL at 18:19

## 2019-03-28 RX ADMIN — Medication 25 MILLIGRAM(S): at 07:21

## 2019-03-28 RX ADMIN — SIMETHICONE 80 MILLIGRAM(S): 80 TABLET, CHEWABLE ORAL at 14:58

## 2019-03-28 RX ADMIN — OXYCODONE AND ACETAMINOPHEN 1 TABLET(S): 5; 325 TABLET ORAL at 21:15

## 2019-03-28 RX ADMIN — Medication 81 MILLIGRAM(S): at 12:14

## 2019-03-28 NOTE — PROVIDER CONTACT NOTE (MEDICATION) - ACTION/TREATMENT ORDERED:
Importance of medication explained along with risks of refusing. Pt verbalized understanding and continues to refuse medication.

## 2019-03-28 NOTE — CONSULT NOTE ADULT - ASSESSMENT
Patient is a 62 yo woman with uncontrolled type 1 diabetes, very labile blood sugars, non-adherent to diet at home with micro and macrovascular complications admitted for shortness of breath and pneumonia (High medical decision making) Patient is a 64 yo woman with uncontrolled type 1 diabetes A1C 7.9  c/b retinopathy, neuropathy, ESRD on HD, CAD, PVD adnm TIA with very labile blood sugars, poor health literacy, muliple admssionos fo DKA here with chest pain. Patient is a 62 yo woman with uncontrolled type 1 diabetes A1C 7.9  c/b retinopathy, neuropathy, ESRD on HD, CAD, PVD and TIA with very labile blood sugars, poor health literacy, multiple admissions for DKA here with chest pain. Patient is a 64 yo woman with uncontrolled type 1 diabetes A1C 7.9  c/b retinopathy, neuropathy, ESRD on HD, CAD, PVD and TIA with very labile blood sugars, poor health literacy, multiple admissions for DKA here with chest pain with hyper and hypoglycemia (high risk patient and high level decision-making).

## 2019-03-28 NOTE — HISTORY OF PRESENT ILLNESS
[FreeTextEntry1] : 64 y/o female who returns today s/p EBUS, mediastinoscopy done on 3/4/19.  Pathology revealed:\par Level 1, 4 7 LNs are negative for tumor. \par \par PMHx includes former smoker, COPD, HTN, T1DM with insulin pump B/L bypass/balloon, chronic LLE pain/edema, CVA (in 2003, deficit in memory recall, no neuromuscular deficit), ESRD on HD 4x week (Mon/Tues/Thus/Sat) via LUE fistula, and MI x 8 (first MI 2014, most recently 6/2018 s/p 7-stents).\par \par She presents today for follow up. \par

## 2019-03-28 NOTE — CONSULT NOTE ADULT - SUBJECTIVE AND OBJECTIVE BOX
HPI:  63F c hx CAD (Cath Feb '19 showing 99% RCA, 100% RPLS, 100% Cx) s/p >8 stents/brachytherapy, ESRD (on HD M/T/T/Sa), DM1, CHF (EF ?30% per last [facility]), mod MR/mild AS, TIA, severe PVD s/p b/l fempop bypass, subclavian vein stenosis s/p stent, COPD not on O2, gout, recent admissions for DKA requiring insulin gtt/PNA, presents with sudden onset chest pain and SOB.    At baseline, pt has significant LLE pain 2/2 PVD, but able to ambulate short distances with a cane. Pt reports missing HD yesterday because the dialysis unit was reportedly full. Pt states that around 6pm yesterday while she was cooking dinner, felt sudden onset substernal chest pain, SOB, and palpitations. The chest pain was not radiating, not pleuritic. It lasted a couple hours, before pt decided to come into the hospital. Pt reports her chest pain and SOB seemed resolve at some point overnight, but unable to say how long it lasted. Pt reports having similar CP like this before. Only other symptoms include her chronic LLE>RLE pain, vomiting yesterday. Denies fevers, URI symptoms, leg swelling, orthopnea.    VS: Tm 98, P 84, BP 89/50, R 20, >95% on RA  In the ED, received LR 1.5L, Lantus 5, Kayexalate. (26 Mar 2019 06:28)      PAST MEDICAL & SURGICAL HISTORY:  COPD (chronic obstructive pulmonary disease)  Localized enlarged lymph nodes  CHF (congestive heart failure): EF 40-45%  Subclavian vein stenosis, left: s/p stent  DKA, type 1: 1/2015  ACS (acute coronary syndrome): 1/2015 - cath revealed 100% ostial stenosis not amenable to PCI - medical management  TIA (transient ischemic attack): x 2 - 8-9 years ago prior to ASD/VSD repair  CAD (coronary artery disease): s/p stents  Gout: past  CVA (cerebral infarction): with no residual, 8 yrs ago, prior to heart surgery - ST memory loss  Peripheral vascular disease: occluded left fem-pop bypass 5/2015  Diabetes mellitus type 1: Insulin Dependent -  ESRD (end stage renal disease): dialysis  M, tue, thursday, saturday  Hyperlipidemia  Status post device closure of ASD: &quot;clamshell&quot;  History of cardiac catheterization: 1/2015 - no intervention  S/P femoral-popliteal bypass surgery: L and R in 2013 with graft; 5/2015 CFA angioplasty left and ileofemoral endarterectomywith vein patch angioplasty of left fem-pop bypass graft  Multiple vascular surgery both leg, left fempop bypass revision 11/2015  AV (arteriovenous fistula): Left AV graft; revision with stent placement 2-3 years ago  S/P cholecystectomy      FAMILY HISTORY:  Family history of smoking  Family history of hypertension  Family history of cancer (Sibling)      Social History:    Outpatient Medications:    MEDICATIONS  (STANDING):  aspirin enteric coated 81 milliGRAM(s) Oral daily  atorvastatin 20 milliGRAM(s) Oral at bedtime  cinacalcet 60 milliGRAM(s) Oral daily  clopidogrel Tablet 75 milliGRAM(s) Oral daily  dextrose 5%. 1000 milliLiter(s) (50 mL/Hr) IV Continuous <Continuous>  dextrose 50% Injectable 12.5 Gram(s) IV Push once  dextrose 50% Injectable 25 Gram(s) IV Push once  DULoxetine 60 milliGRAM(s) Oral daily  heparin  Injectable 5000 Unit(s) SubCutaneous every 8 hours  insulin glargine Injectable (LANTUS) 3 Unit(s) SubCutaneous at bedtime  insulin lispro (HumaLOG) corrective regimen sliding scale   SubCutaneous three times a day before meals  insulin lispro (HumaLOG) corrective regimen sliding scale   SubCutaneous at bedtime  insulin lispro Injectable (HumaLOG) 2 Unit(s) SubCutaneous three times a day with meals  metoprolol tartrate 25 milliGRAM(s) Oral two times a day  sevelamer carbonate 800 milliGRAM(s) Oral three times a day with meals    MEDICATIONS  (PRN):  dextrose 40% Gel 15 Gram(s) Oral once PRN Blood Glucose LESS THAN 70 milliGRAM(s)/deciliter  glucagon  Injectable 1 milliGRAM(s) IntraMuscular once PRN Glucose LESS THAN 70 milligrams/deciliter  oxyCODONE    IR 5 milliGRAM(s) Oral every 4 hours PRN Moderate Pain (4 - 6)      Allergies    No Known Allergies    Intolerances      Review of Systems:  Constitutional: No fever  Eyes: No blurry vision  Neuro: No tremors  HEENT: No pain  Cardiovascular: No chest pain, palpitations  Respiratory: No SOB, no cough  GI: No nausea, vomiting, abdominal pain  : No dysuria  Skin: no rash  Psych: no depression  Endocrine: no polyuria, polydipsia  Hem/lymph: no swelling  Osteoporosis: no fractures    ALL OTHER SYSTEMS REVIEWED AND NEGATIVE    UNABLE TO OBTAIN    PHYSICAL EXAM:  VITALS: T(C): 36.8 (03-28-19 @ 11:41)  T(F): 98.2 (03-28-19 @ 11:41), Max: 98.2 (03-28-19 @ 11:41)  HR: 73 (03-28-19 @ 11:41) (73 - 89)  BP: 151/80 (03-28-19 @ 11:41) (137/78 - 167/77)  RR:  (17 - 18)  SpO2:  (94% - 100%)  Wt(kg): --  GENERAL: NAD, well-groomed, well-developed  EYES: No proptosis, no lid lag, anicteric  HEENT:  Atraumatic, Normocephalic, moist mucous membranes  THYROID: Normal size, no palpable nodules  RESPIRATORY: Clear to auscultation bilaterally; No rales, rhonchi, wheezing, or rubs  CARDIOVASCULAR: Regular rate and rhythm; No murmurs; no peripheral edema  GI: Soft, nontender, non distended, normal bowel sounds  SKIN: Dry, intact, No rashes or lesions  MUSCULOSKELETAL: Full range of motion, normal strength  NEURO: sensation intact, extraocular movements intact, no tremor, normal reflexes  PSYCH: Alert and oriented x 3, normal affect, normal mood  CUSHING'S SIGNS: no striae    POCT Blood Glucose.: 139 mg/dL (03-28-19 @ 09:30)  POCT Blood Glucose.: 322 mg/dL (03-27-19 @ 21:41)  POCT Blood Glucose.: 195 mg/dL (03-27-19 @ 17:57)  POCT Blood Glucose.: 94 mg/dL (03-27-19 @ 10:54)  POCT Blood Glucose.: 106 mg/dL (03-27-19 @ 08:29)  POCT Blood Glucose.: 111 mg/dL (03-26-19 @ 22:11)  POCT Blood Glucose.: 86 mg/dL (03-26-19 @ 18:58)  POCT Blood Glucose.: 236 mg/dL (03-26-19 @ 12:56)  POCT Blood Glucose.: 298 mg/dL (03-26-19 @ 09:28)  POCT Blood Glucose.: 326 mg/dL (03-26-19 @ 06:47)  POCT Blood Glucose.: 313 mg/dL (03-26-19 @ 03:39)                            9.8    3.8   )-----------( 206      ( 27 Mar 2019 06:37 )             28.8       03-28    138  |  97  |  10  ----------------------------<  183<H>  4.4   |  27  |  3.10<H>    EGFR if : 18<L>  EGFR if non : 15<L>    Ca    8.4      03-28  Mg     2.0     03-28  Phos  4.0     03-27    TPro  6.7  /  Alb  4.2  /  TBili  0.3  /  DBili  x   /  AST  20  /  ALT  12  /  AlkPhos  69  03-26      Thyroid Function Tests:      Hemoglobin A1C, Whole Blood: 7.9 % <H> [4.0 - 5.6] (02-27-19 @ 11:08)  Hemoglobin A1C, Whole Blood: 7.6 % <H> [4.0 - 5.6] (02-13-19 @ 08:42)          Radiology: HPI:  63F c hx CAD (Cath Feb '19 showing 99% RCA, 100% RPLS, 100% Cx) s/p >8 stents/brachytherapy, ESRD (on HD M/T/T/Sa), DM1, CHF (EF ?30% per last [facility]), mod MR/mild AS, TIA, severe PVD s/p b/l fempop bypass, subclavian vein stenosis s/p stent, COPD not on O2, gout, recent admissions for DKA requiring insulin gtt/PNA, presents with sudden onset chest pain and SOB.    At baseline, pt has significant LLE pain 2/2 PVD, but able to ambulate short distances with a cane. Pt reports missing HD yesterday because the dialysis unit was reportedly full. Pt states that around 6pm yesterday while she was cooking dinner, felt sudden onset substernal chest pain, SOB, and palpitations. The chest pain was not radiating, not pleuritic. It lasted a couple hours, before pt decided to come into the hospital. Pt reports her chest pain and SOB seemed resolve at some point overnight, but unable to say how long it lasted. Pt reports having similar CP like this before. Only other symptoms include her chronic LLE>RLE pain, vomiting yesterday. Denies fevers, URI symptoms, leg swelling, orthopnea.    VS: Tm 98, P 84, BP 89/50, R 20, >95% on RA  In the ED, received LR 1.5L, Lantus 5, Kayexalate. (26 Mar 2019 06:28)    Endocrine History: 63 y.o. female with h/o uncontrolled Type 1 DM (diagnosed at age 20) c/b neuropathy, retinopathy, ESRD on HD with CAD s/p PCI, TIA, CHF and lung lesion admitted for chest pain and PROSPER. Patient had recent cardiac cath in Feb 2019 with no intervention.   Patient follows with endocrinologist Dr Ley.   Has had DKA in the past. Is UTD with opthalmology and goes every 3 months. On HD 4 days per week. No podiatry.  At home takes Lantus 5 units QHS and Humalog 5 to 10 units before meals. Patient reports that she like to eat but in the hospital not eating much because she doesn't like the food. This morning patient was hypoglycemic to 46; however patient has awareness. Last night patient received Lantus 10 units. Currently feeling well and no complaints. No SOB or CP. No nausea or vomiting. Was at [facility] last month and discharged on Lantus 3 units QHS and Humalog 2/2/3 premeals.           PAST MEDICAL & SURGICAL HISTORY:  COPD (chronic obstructive pulmonary disease)  Localized enlarged lymph nodes  CHF (congestive heart failure): EF 40-45%  Subclavian vein stenosis, left: s/p stent  DKA, type 1: 1/2015  ACS (acute coronary syndrome): 1/2015 - cath revealed 100% ostial stenosis not amenable to PCI - medical management  TIA (transient ischemic attack): x 2 - 8-9 years ago prior to ASD/VSD repair  CAD (coronary artery disease): s/p stents  Gout: past  CVA (cerebral infarction): with no residual, 8 yrs ago, prior to heart surgery - ST memory loss  Peripheral vascular disease: occluded left fem-pop bypass 5/2015  Diabetes mellitus type 1: Insulin Dependent -  ESRD (end stage renal disease): dialysis  M, tue, thursday, saturday  Hyperlipidemia  Status post device closure of ASD: &quot;clamshell&quot;  History of cardiac catheterization: 1/2015 - no intervention  S/P femoral-popliteal bypass surgery: L and R in 2013 with graft; 5/2015 CFA angioplasty left and ileofemoral endarterectomywith vein patch angioplasty of left fem-pop bypass graft  Multiple vascular surgery both leg, left fempop bypass revision 11/2015  AV (arteriovenous fistula): Left AV graft; revision with stent placement 2-3 years ago  S/P cholecystectomy      FAMILY HISTORY:  Family history of smoking  Family history of hypertension  Family history of cancer (Sibling)      Social History:    Outpatient Medications:    MEDICATIONS  (STANDING):  aspirin enteric coated 81 milliGRAM(s) Oral daily  atorvastatin 20 milliGRAM(s) Oral at bedtime  cinacalcet 60 milliGRAM(s) Oral daily  clopidogrel Tablet 75 milliGRAM(s) Oral daily  dextrose 5%. 1000 milliLiter(s) (50 mL/Hr) IV Continuous <Continuous>  dextrose 50% Injectable 12.5 Gram(s) IV Push once  dextrose 50% Injectable 25 Gram(s) IV Push once  DULoxetine 60 milliGRAM(s) Oral daily  heparin  Injectable 5000 Unit(s) SubCutaneous every 8 hours  insulin glargine Injectable (LANTUS) 3 Unit(s) SubCutaneous at bedtime  insulin lispro (HumaLOG) corrective regimen sliding scale   SubCutaneous three times a day before meals  insulin lispro (HumaLOG) corrective regimen sliding scale   SubCutaneous at bedtime  insulin lispro Injectable (HumaLOG) 2 Unit(s) SubCutaneous three times a day with meals  metoprolol tartrate 25 milliGRAM(s) Oral two times a day  sevelamer carbonate 800 milliGRAM(s) Oral three times a day with meals    MEDICATIONS  (PRN):  dextrose 40% Gel 15 Gram(s) Oral once PRN Blood Glucose LESS THAN 70 milliGRAM(s)/deciliter  glucagon  Injectable 1 milliGRAM(s) IntraMuscular once PRN Glucose LESS THAN 70 milligrams/deciliter  oxyCODONE    IR 5 milliGRAM(s) Oral every 4 hours PRN Moderate Pain (4 - 6)      Allergies    No Known Allergies    Intolerances      Review of Systems:  Constitutional: No fever  Eyes: No blurry vision  Neuro: No tremors  HEENT: No pain  Cardiovascular: No chest pain, palpitations  Respiratory: No SOB, no cough  GI: No nausea, vomiting, abdominal pain  : No dysuria  Skin: no rash  Psych: no depression  Endocrine: no polyuria, polydipsia  Hem/lymph: no swelling  Osteoporosis: no fractures    ALL OTHER SYSTEMS REVIEWED AND NEGATIVE    UNABLE TO OBTAIN    PHYSICAL EXAM:  VITALS: T(C): 36.8 (03-28-19 @ 11:41)  T(F): 98.2 (03-28-19 @ 11:41), Max: 98.2 (03-28-19 @ 11:41)  HR: 73 (03-28-19 @ 11:41) (73 - 89)  BP: 151/80 (03-28-19 @ 11:41) (137/78 - 167/77)  RR:  (17 - 18)  SpO2:  (94% - 100%)  Wt(kg): --  GENERAL: NAD, well-groomed, well-developed  EYES: No proptosis, no lid lag, anicteric  HEENT:  Atraumatic, Normocephalic, moist mucous membranes  THYROID: Normal size, no palpable nodules  RESPIRATORY: Clear to auscultation bilaterally; No rales, rhonchi, wheezing, or rubs  CARDIOVASCULAR: Regular rate and rhythm; No murmurs; no peripheral edema  GI: Soft, nontender, non distended, normal bowel sounds  SKIN: Dry, intact, No rashes or lesions  MUSCULOSKELETAL: Full range of motion, normal strength  NEURO: sensation intact, extraocular movements intact, no tremor, normal reflexes  PSYCH: Alert and oriented x 3, normal affect, normal mood  CUSHING'S SIGNS: no striae    POCT Blood Glucose.: 139 mg/dL (03-28-19 @ 09:30)  POCT Blood Glucose.: 322 mg/dL (03-27-19 @ 21:41)  POCT Blood Glucose.: 195 mg/dL (03-27-19 @ 17:57)  POCT Blood Glucose.: 94 mg/dL (03-27-19 @ 10:54)  POCT Blood Glucose.: 106 mg/dL (03-27-19 @ 08:29)  POCT Blood Glucose.: 111 mg/dL (03-26-19 @ 22:11)  POCT Blood Glucose.: 86 mg/dL (03-26-19 @ 18:58)  POCT Blood Glucose.: 236 mg/dL (03-26-19 @ 12:56)  POCT Blood Glucose.: 298 mg/dL (03-26-19 @ 09:28)  POCT Blood Glucose.: 326 mg/dL (03-26-19 @ 06:47)  POCT Blood Glucose.: 313 mg/dL (03-26-19 @ 03:39)                            9.8    3.8   )-----------( 206      ( 27 Mar 2019 06:37 )             28.8       03-28    138  |  97  |  10  ----------------------------<  183<H>  4.4   |  27  |  3.10<H>    EGFR if : 18<L>  EGFR if non : 15<L>    Ca    8.4      03-28  Mg     2.0     03-28  Phos  4.0     03-27    TPro  6.7  /  Alb  4.2  /  TBili  0.3  /  DBili  x   /  AST  20  /  ALT  12  /  AlkPhos  69  03-26      Thyroid Function Tests:      Hemoglobin A1C, Whole Blood: 7.9 % <H> [4.0 - 5.6] (02-27-19 @ 11:08)  Hemoglobin A1C, Whole Blood: 7.6 % <H> [4.0 - 5.6] (02-13-19 @ 08:42)          Radiology: HPI:  63F c hx CAD (Cath Feb '19 showing 99% RCA, 100% RPLS, 100% Cx) s/p >8 stents/brachytherapy, ESRD (on HD M/T/T/Sa), DM1, CHF (EF ?30% per last [facility]), mod MR/mild AS, TIA, severe PVD s/p b/l fempop bypass, subclavian vein stenosis s/p stent, COPD not on O2, gout, recent admissions for DKA requiring insulin gtt/PNA, presents with sudden onset chest pain and SOB.    At baseline, pt has significant LLE pain 2/2 PVD, but able to ambulate short distances with a cane. Pt reports missing HD yesterday because the dialysis unit was reportedly full. Pt states that around 6pm yesterday while she was cooking dinner, felt sudden onset substernal chest pain, SOB, and palpitations. The chest pain was not radiating, not pleuritic. It lasted a couple hours, before pt decided to come into the hospital. Pt reports her chest pain and SOB seemed resolve at some point overnight, but unable to say how long it lasted. Pt reports having similar CP like this before. Only other symptoms include her chronic LLE>RLE pain, vomiting yesterday. Denies fevers, URI symptoms, leg swelling, orthopnea.    VS: Tm 98, P 84, BP 89/50, R 20, >95% on RA  In the ED, received LR 1.5L, Lantus 5, Kayexalate. (26 Mar 2019 06:28)    Endocrine History: 63 y.o. female with h/o uncontrolled Type 1 DM (diagnosed at age 20) c/b neuropathy, retinopathy, ESRD on HD with CAD s/p PCI, TIA, CHF and lung lesion admitted for chest pain and PROSPER. Patient had recent cardiac cath in Feb 2019 with no intervention.   Patient follows with endocrinologist Dr Ley. Last saw her a month ago. lizbeth takes Lantus 3-5 U at night and Humalog 3U before meals. States ehr AM glcuseo 180-200 prelunch (often hypglceymic) predinner 200s HS 150s. Has had mulitple admissions for  DKA in the past. Is UTD with opthalmology and goes every 3 months. On HD 4 days per week. No podiatry. States she tries to moderate her intake of carbs. Lizbeth used to be on an isnulin pump in the past but was not able to manage it appropatiraly.           PAST MEDICAL & SURGICAL HISTORY:  COPD (chronic obstructive pulmonary disease)  Localized enlarged lymph nodes  CHF (congestive heart failure): EF 40-45%  Subclavian vein stenosis, left: s/p stent  DKA, type 1: 1/2015  ACS (acute coronary syndrome): 1/2015 - cath revealed 100% ostial stenosis not amenable to PCI - medical management  TIA (transient ischemic attack): x 2 - 8-9 years ago prior to ASD/VSD repair  CAD (coronary artery disease): s/p stents  Gout: past  CVA (cerebral infarction): with no residual, 8 yrs ago, prior to heart surgery - ST memory loss  Peripheral vascular disease: occluded left fem-pop bypass 5/2015  Diabetes mellitus type 1: Insulin Dependent -  ESRD (end stage renal disease): dialysis  M, tue, thursday, saturday  Hyperlipidemia  Status post device closure of ASD: &quot;[place]&quot;  History of cardiac catheterization: 1/2015 - no intervention  S/P femoral-popliteal bypass surgery: L and R in 2013 with graft; 5/2015 CFA angioplasty left and ileofemoral endarterectomywith vein patch angioplasty of left fem-pop bypass graft  Multiple vascular surgery both leg, left fempop bypass revision 11/2015  AV (arteriovenous fistula): Left AV graft; revision with stent placement 2-3 years ago  S/P cholecystectomy      FAMILY HISTORY:  Family history of smoking  Family history of hypertension  Family history of cancer (Sibling)      Social History:Lives with     Outpatient Medications:  · 	Colace 100 mg oral capsule: 1 cap(s) orally 3 times a day   · 	oxyCODONE 5 mg oral tablet: 1 tab(s) orally every 4 hours, As Needed -for moderate pain MDD:6 tabs   · 	Lantus Solostar Pen 100 units/mL subcutaneous solution: 5 unit(s) subcutaneous once a day (at bedtime)  · 	nitroglycerin 0.4 mg sublingual tablet: 1 tab(s) sublingually every 5 minutes for total 3 tabs, if you have chest pain.   · 	senna oral tablet: 2 tab(s) orally once a day (at bedtime), As Needed -for constipation   · 	atorvastatin 20 mg oral tablet: 1 tab(s) orally once a day (at bedtime)  · 	Metoprolol Succinate ER 50 mg oral tablet, extended release: 1 tab(s) orally once a day  · 	DULoxetine 60 mg oral delayed release capsule: 1 cap(s) orally once a day  · 	Sensipar 60 mg oral tablet: 1 tab(s) orally once a day  · 	Renvela 800 mg oral tablet: 3 tab(s) orally  (with meals)  · 	hydrALAZINE 25 mg oral tablet: 4 tab(s) orally every 8 hours  · 	Tylenol 325 mg oral tablet: 2 tab(s) orally every 4 hours for pain  · 	Lasix 40 mg oral tablet: 1 tab(s) orally 3 times a week Monday Friday Sunday  · 	lisinopril 40 mg oral tablet: 1 tab(s) orally once a day  · 	aspirin 81 mg oral tablet: 1 tab(s) orally once a day  · 	Plavix 75 mg oral tablet: 1 tab(s) orally once a day  · 	HumaLOG 100 units/mL injectable solution: sliding scale 5-10 units sq  · 	Multiple Vitamins oral tablet: 1 tab(s) orally once a day, pt dc d > 1 month ago      MEDICATIONS  (STANDING):  aspirin enteric coated 81 milliGRAM(s) Oral daily  atorvastatin 20 milliGRAM(s) Oral at bedtime  cinacalcet 60 milliGRAM(s) Oral daily  clopidogrel Tablet 75 milliGRAM(s) Oral daily  dextrose 5%. 1000 milliLiter(s) (50 mL/Hr) IV Continuous <Continuous>  dextrose 50% Injectable 12.5 Gram(s) IV Push once  dextrose 50% Injectable 25 Gram(s) IV Push once  DULoxetine 60 milliGRAM(s) Oral daily  heparin  Injectable 5000 Unit(s) SubCutaneous every 8 hours  insulin glargine Injectable (LANTUS) 3 Unit(s) SubCutaneous at bedtime  insulin lispro (HumaLOG) corrective regimen sliding scale   SubCutaneous three times a day before meals  insulin lispro (HumaLOG) corrective regimen sliding scale   SubCutaneous at bedtime  insulin lispro Injectable (HumaLOG) 2 Unit(s) SubCutaneous three times a day with meals  metoprolol tartrate 25 milliGRAM(s) Oral two times a day  sevelamer carbonate 800 milliGRAM(s) Oral three times a day with meals    MEDICATIONS  (PRN):  dextrose 40% Gel 15 Gram(s) Oral once PRN Blood Glucose LESS THAN 70 milliGRAM(s)/deciliter  glucagon  Injectable 1 milliGRAM(s) IntraMuscular once PRN Glucose LESS THAN 70 milligrams/deciliter  oxyCODONE    IR 5 milliGRAM(s) Oral every 4 hours PRN Moderate Pain (4 - 6)      Allergies    No Known Allergies    Intolerances      Review of Systems:  Constitutional: No fever  Eyes: No blurry vision  Neuro: No tremors  HEENT: No pain  Cardiovascular: + chest pain, palpitations  Respiratory: + SOB, no cough  GI: No nausea, vomiting, abdominal pain  : No dysuria  Skin: no rash  Psych: no depression  Endocrine: no polyuria, polydipsia  Hem/lymph: no swelling  Osteoporosis: no fractures    ALL OTHER SYSTEMS REVIEWED AND NEGATIVE        PHYSICAL EXAM:  VITALS: T(C): 36.8 (03-28-19 @ 11:41)  T(F): 98.2 (03-28-19 @ 11:41), Max: 98.2 (03-28-19 @ 11:41)  HR: 73 (03-28-19 @ 11:41) (73 - 89)  BP: 151/80 (03-28-19 @ 11:41) (137/78 - 167/77)  RR:  (17 - 18)  SpO2:  (94% - 100%)  Wt(kg): --  GENERAL: NAD, well-groomed, well-developed  EYES: No proptosis, no lid lag, anicteric  HEENT:  Atraumatic, Normocephalic, moist mucous membranes  THYROID: Normal size, no palpable nodules  RESPIRATORY: Clear to auscultation bilaterally; No rales, rhonchi, wheezing, or rubs  CARDIOVASCULAR: Regular rate and rhythm; No murmurs; no peripheral edema  GI: Soft, nontender, non distended, normal bowel sounds  SKIN: Dry, intact, No rashes or lesions  PSYCH: Alert and oriented x 3      POCT Blood Glucose.: 139 mg/dL (03-28-19 @ 09:30) H2  POCT Blood Glucose.: 322 mg/dL (03-27-19 @ 21:41) L3  POCT Blood Glucose.: 195 mg/dL (03-27-19 @ 17:57) H1  POCT Blood Glucose.: 94 mg/dL (03-27-19 @ 10:54)  POCT Blood Glucose.: 106 mg/dL (03-27-19 @ 08:29)  POCT Blood Glucose.: 111 mg/dL (03-26-19 @ 22:11)L5  POCT Blood Glucose.: 86 mg/dL (03-26-19 @ 18:58)  POCT Blood Glucose.: 236 mg/dL (03-26-19 @ 12:56)  POCT Blood Glucose.: 298 mg/dL (03-26-19 @ 09:28)  POCT Blood Glucose.: 326 mg/dL (03-26-19 @ 06:47)  POCT Blood Glucose.: 313 mg/dL (03-26-19 @ 03:39)                            9.8    3.8   )-----------( 206      ( 27 Mar 2019 06:37 )             28.8       03-28    138  |  97  |  10  ----------------------------<  183<H>  4.4   |  27  |  3.10<H>    EGFR if : 18<L>  EGFR if non : 15<L>    Ca    8.4      03-28  Mg     2.0     03-28  Phos  4.0     03-27    TPro  6.7  /  Alb  4.2  /  TBili  0.3  /  DBili  x   /  AST  20  /  ALT  12  /  AlkPhos  69  03-26          Hemoglobin A1C, Whole Blood: 7.9 % <H> [4.0 - 5.6] (02-27-19 @ 11:08)  Hemoglobin A1C, Whole Blood: 7.6 % <H> [4.0 - 5.6] (02-13-19 @ 08:42) HPI:  63F c hx CAD (Cath Feb '19 showing 99% RCA, 100% RPLS, 100% Cx) s/p >8 stents/brachytherapy, ESRD (on HD M/T/T/Sa), DM1, CHF (EF ?30% per last [facility]), mod MR/mild AS, TIA, severe PVD s/p b/l fempop bypass, subclavian vein stenosis s/p stent, COPD not on O2, gout, recent admissions for DKA requiring insulin gtt/PNA, presents with sudden onset chest pain and SOB.    At baseline, pt has significant LLE pain 2/2 PVD, but able to ambulate short distances with a cane. Pt reports missing HD yesterday because the dialysis unit was reportedly full. Pt states that around 6pm yesterday while she was cooking dinner, felt sudden onset substernal chest pain, SOB, and palpitations. The chest pain was not radiating, not pleuritic. It lasted a couple hours, before pt decided to come into the hospital. Pt reports her chest pain and SOB seemed resolve at some point overnight, but unable to say how long it lasted. Pt reports having similar CP like this before. Only other symptoms include her chronic LLE>RLE pain, vomiting yesterday. Denies fevers, URI symptoms, leg swelling, orthopnea.    VS: Tm 98, P 84, BP 89/50, R 20, >95% on RA  In the ED, received LR 1.5L, Lantus 5, Kayexalate. (26 Mar 2019 06:28)    Endocrine History: 63 y.o. female with h/o uncontrolled Type 1 DM (diagnosed at age 20) c/b neuropathy, retinopathy, ESRD on HD with CAD s/p PCI, TIA, CHF and lung lesion admitted for chest pain and PROSPER. Patient had recent cardiac cath in Feb 2019 with no intervention. Patient follows with endocrinologist Dr Ley. Last saw her a month ago. Patient takes Lantus 3-5 U at night and Humalog 3U before meals. States her AM glucose 180-200 prelunch (often hypoglycemic) predinner 200s HS 150s. [facility]. Is UTD with opthalmology and goes every 3 months. On HD 4 days per week. No podiatry. States she tries to moderate her intake of carbs. Patient used to be on an insulin pump in the past but was not able to manage it appropriately.           PAST MEDICAL & SURGICAL HISTORY:  COPD (chronic obstructive pulmonary disease)  Localized enlarged lymph nodes  CHF (congestive heart failure): EF 40-45%  Subclavian vein stenosis, left: s/p stent  DKA, type 1: 1/2015  ACS (acute coronary syndrome): 1/2015 - cath revealed 100% ostial stenosis not amenable to PCI - medical management  TIA (transient ischemic attack): x 2 - 8-9 years ago prior to ASD/VSD repair  CAD (coronary artery disease): s/p stents  Gout: past  CVA (cerebral infarction): with no residual, 8 yrs ago, prior to heart surgery - ST memory loss  Peripheral vascular disease: occluded left fem-pop bypass 5/2015  Diabetes mellitus type 1: Insulin Dependent -  ESRD (end stage renal disease): dialysis  M, tue, thursday, saturday  Hyperlipidemia  Status post device closure of ASD: &quot;clamshell&quot;  History of cardiac catheterization: 1/2015 - no intervention  S/P femoral-popliteal bypass surgery: L and R in 2013 with graft; 5/2015 CFA angioplasty left and ileofemoral endarterectomywith vein patch angioplasty of left fem-pop bypass graft  Multiple vascular surgery both leg, left fempop bypass revision 11/2015  AV (arteriovenous fistula): Left AV graft; revision with stent placement 2-3 years ago  S/P cholecystectomy      FAMILY HISTORY:  Family history of smoking  Family history of hypertension  Family history of cancer (Sibling)      Social History:Lives with     Outpatient Medications:  · 	Colace 100 mg oral capsule: 1 cap(s) orally 3 times a day   · 	oxyCODONE 5 mg oral tablet: 1 tab(s) orally every 4 hours, As Needed -for moderate pain MDD:6 tabs   · 	Lantus Solostar Pen 100 units/mL subcutaneous solution: 5 unit(s) subcutaneous once a day (at bedtime)  · 	nitroglycerin 0.4 mg sublingual tablet: 1 tab(s) sublingually every 5 minutes for total 3 tabs, if you have chest pain.   · 	senna oral tablet: 2 tab(s) orally once a day (at bedtime), As Needed -for constipation   · 	atorvastatin 20 mg oral tablet: 1 tab(s) orally once a day (at bedtime)  · 	Metoprolol Succinate ER 50 mg oral tablet, extended release: 1 tab(s) orally once a day  · 	DULoxetine 60 mg oral delayed release capsule: 1 cap(s) orally once a day  · 	Sensipar 60 mg oral tablet: 1 tab(s) orally once a day  · 	Renvela 800 mg oral tablet: 3 tab(s) orally  (with meals)  · 	hydrALAZINE 25 mg oral tablet: 4 tab(s) orally every 8 hours  · 	Tylenol 325 mg oral tablet: 2 tab(s) orally every 4 hours for pain  · 	Lasix 40 mg oral tablet: 1 tab(s) orally 3 times a week Monday Friday Sunday  · 	lisinopril 40 mg oral tablet: 1 tab(s) orally once a day  · 	aspirin 81 mg oral tablet: 1 tab(s) orally once a day  · 	Plavix 75 mg oral tablet: 1 tab(s) orally once a day  · 	HumaLOG 100 units/mL injectable solution: sliding scale 5-10 units sq  · 	Multiple Vitamins oral tablet: 1 tab(s) orally once a day, pt dc d > 1 month ago      MEDICATIONS  (STANDING):  aspirin enteric coated 81 milliGRAM(s) Oral daily  atorvastatin 20 milliGRAM(s) Oral at bedtime  cinacalcet 60 milliGRAM(s) Oral daily  clopidogrel Tablet 75 milliGRAM(s) Oral daily  dextrose 5%. 1000 milliLiter(s) (50 mL/Hr) IV Continuous <Continuous>  dextrose 50% Injectable 12.5 Gram(s) IV Push once  dextrose 50% Injectable 25 Gram(s) IV Push once  DULoxetine 60 milliGRAM(s) Oral daily  heparin  Injectable 5000 Unit(s) SubCutaneous every 8 hours  insulin glargine Injectable (LANTUS) 3 Unit(s) SubCutaneous at bedtime  insulin lispro (HumaLOG) corrective regimen sliding scale   SubCutaneous three times a day before meals  insulin lispro (HumaLOG) corrective regimen sliding scale   SubCutaneous at bedtime  insulin lispro Injectable (HumaLOG) 2 Unit(s) SubCutaneous three times a day with meals  metoprolol tartrate 25 milliGRAM(s) Oral two times a day  sevelamer carbonate 800 milliGRAM(s) Oral three times a day with meals    MEDICATIONS  (PRN):  dextrose 40% Gel 15 Gram(s) Oral once PRN Blood Glucose LESS THAN 70 milliGRAM(s)/deciliter  glucagon  Injectable 1 milliGRAM(s) IntraMuscular once PRN Glucose LESS THAN 70 milligrams/deciliter  oxyCODONE    IR 5 milliGRAM(s) Oral every 4 hours PRN Moderate Pain (4 - 6)      Allergies    No Known Allergies    Intolerances      Review of Systems:  Constitutional: No fever  Eyes: No blurry vision  Neuro: No tremors  HEENT: No pain  Cardiovascular: + chest pain, palpitations  Respiratory: + SOB, no cough  GI: No nausea, vomiting, abdominal pain  : No dysuria  Skin: no rash  Psych: no depression  Endocrine: no polyuria, polydipsia  Hem/lymph: no swelling  Osteoporosis: no fractures    ALL OTHER SYSTEMS REVIEWED AND NEGATIVE        PHYSICAL EXAM:  VITALS: T(C): 36.8 (03-28-19 @ 11:41)  T(F): 98.2 (03-28-19 @ 11:41), Max: 98.2 (03-28-19 @ 11:41)  HR: 73 (03-28-19 @ 11:41) (73 - 89)  BP: 151/80 (03-28-19 @ 11:41) (137/78 - 167/77)  RR:  (17 - 18)  SpO2:  (94% - 100%)  Wt(kg): --  GENERAL: NAD, well-groomed, well-developed  EYES: No proptosis, no lid lag, anicteric  HEENT:  Atraumatic, Normocephalic, moist mucous membranes  THYROID: Normal size, no palpable nodules  RESPIRATORY: Clear to auscultation bilaterally; No rales, rhonchi, wheezing, or rubs  CARDIOVASCULAR: Regular rate and rhythm; No murmurs; no peripheral edema  GI: Soft, nontender, non distended, normal bowel sounds  SKIN: Dry, intact, No rashes or lesions  PSYCH: Alert and oriented x 3      POCT Blood Glucose.: 139 mg/dL (03-28-19 @ 09:30) H2  POCT Blood Glucose.: 322 mg/dL (03-27-19 @ 21:41) L3  POCT Blood Glucose.: 195 mg/dL (03-27-19 @ 17:57) H1  POCT Blood Glucose.: 94 mg/dL (03-27-19 @ 10:54)  POCT Blood Glucose.: 106 mg/dL (03-27-19 @ 08:29)  POCT Blood Glucose.: 111 mg/dL (03-26-19 @ 22:11)L5  POCT Blood Glucose.: 86 mg/dL (03-26-19 @ 18:58)  POCT Blood Glucose.: 236 mg/dL (03-26-19 @ 12:56)  POCT Blood Glucose.: 298 mg/dL (03-26-19 @ 09:28)  POCT Blood Glucose.: 326 mg/dL (03-26-19 @ 06:47)  POCT Blood Glucose.: 313 mg/dL (03-26-19 @ 03:39)                            9.8    3.8   )-----------( 206      ( 27 Mar 2019 06:37 )             28.8       03-28    138  |  97  |  10  ----------------------------<  183<H>  4.4   |  27  |  3.10<H>    EGFR if : 18<L>  EGFR if non : 15<L>    Ca    8.4      03-28  Mg     2.0     03-28  Phos  4.0     03-27    TPro  6.7  /  Alb  4.2  /  TBili  0.3  /  DBili  x   /  AST  20  /  ALT  12  /  AlkPhos  69  03-26          Hemoglobin A1C, Whole Blood: 7.9 % <H> [4.0 - 5.6] (02-27-19 @ 11:08)  Hemoglobin A1C, Whole Blood: 7.6 % <H> [4.0 - 5.6] (02-13-19 @ 08:42)

## 2019-03-28 NOTE — CONSULT LETTER
[FreeTextEntry2] : Dr. Thompson (Pulm/Ref) [FreeTextEntry3] : Peter Thurston MD, FACS \par Chief, Division of Thoracic Surgery \par Director, Minimally Invasive Thoracic Surgery \par Department of Cardiovascular and Thoracic Surgery \par [facility] \par , Cardiovascular and Thoracic Surgery

## 2019-03-28 NOTE — CONSULT NOTE ADULT - PROBLEM SELECTOR RECOMMENDATION 9
-patient was ordered for Lantus 10 and hypoglycemia to the 60's this morning. Patient has very labile fingersticks with many hypo and hyperglycemic excursions in the past. In the setting of her renal disease, levemir may be a safer choice.  Change to Levemir 7 units at bedtime and decrease humalog to 3 units TID with meals  -low correction sliding scale  -consistent carbohydrate diet  -target glucose 100-180 ideally  -she is currently in hemodialysis and no recent FS is available at this time.  She is otherwise asymptomatic, mentating and contributing to providing medical history  -the patient has type 1 diabetes and requires insulin with all meals.  If there is any question as to whether it should be given, please contact endocrinology.  Do not hold if the patient will be eating.  Contact endocrine at [phone] or page fellow on call with questions -Contineu Lantus 3U HS and Humaog 2U TID plus low correctional scale before meals and awt bedtrime  -Give premeal Huamlog only if patietn has >50% of her meal  -Goal gclocuseo 100-180 -Continue Lantus 3U HS and Humalog 2U TID plus low correctional scale before meals and at bedtime  -Give premeal Humalog only if patient has >50% of her meal  -Goal glucose 100-180

## 2019-03-28 NOTE — ASSESSMENT
[FreeTextEntry1] : \par \par \par I have reviewed the patient's medical records and diagnostic images during the time of this office visit, and I have made the following recommendation: \par 1.

## 2019-03-28 NOTE — CONSULT NOTE ADULT - ATTENDING COMMENTS
Agree with assessment and plan as above by Dr. Partida. Reviewed all pertinent labs, glucose values, and imaging studies. Modifications made as indicated above.     Nik Lorenzo D.O  [phone]

## 2019-03-29 DIAGNOSIS — E10.59 TYPE 1 DIABETES MELLITUS WITH OTHER CIRCULATORY COMPLICATIONS: ICD-10-CM

## 2019-03-29 LAB
ANION GAP SERPL CALC-SCNC: 14 MMOL/L — SIGNIFICANT CHANGE UP (ref 5–17)
BUN SERPL-MCNC: 15 MG/DL — SIGNIFICANT CHANGE UP (ref 7–23)
CALCIUM SERPL-MCNC: 7.8 MG/DL — LOW (ref 8.4–10.5)
CALCIUM SERPL-MCNC: 7.8 MG/DL — LOW (ref 8.4–10.5)
CHLORIDE SERPL-SCNC: 96 MMOL/L — SIGNIFICANT CHANGE UP (ref 96–108)
CO2 SERPL-SCNC: 28 MMOL/L — SIGNIFICANT CHANGE UP (ref 22–31)
CREAT SERPL-MCNC: 4.85 MG/DL — HIGH (ref 0.5–1.3)
GLUCOSE BLDC GLUCOMTR-MCNC: 105 MG/DL — HIGH (ref 70–99)
GLUCOSE BLDC GLUCOMTR-MCNC: 125 MG/DL — HIGH (ref 70–99)
GLUCOSE BLDC GLUCOMTR-MCNC: 133 MG/DL — HIGH (ref 70–99)
GLUCOSE BLDC GLUCOMTR-MCNC: 188 MG/DL — HIGH (ref 70–99)
GLUCOSE BLDC GLUCOMTR-MCNC: 66 MG/DL — LOW (ref 70–99)
GLUCOSE BLDC GLUCOMTR-MCNC: 71 MG/DL — SIGNIFICANT CHANGE UP (ref 70–99)
GLUCOSE SERPL-MCNC: 79 MG/DL — SIGNIFICANT CHANGE UP (ref 70–99)
HCT VFR BLD CALC: 30.3 % — LOW (ref 34.5–45)
HGB BLD-MCNC: 10.2 G/DL — LOW (ref 11.5–15.5)
MCHC RBC-ENTMCNC: 33.7 GM/DL — SIGNIFICANT CHANGE UP (ref 32–36)
MCHC RBC-ENTMCNC: 35 PG — HIGH (ref 27–34)
MCV RBC AUTO: 104 FL — HIGH (ref 80–100)
PHOSPHATE SERPL-MCNC: 3.6 MG/DL — SIGNIFICANT CHANGE UP (ref 2.5–4.5)
PLATELET # BLD AUTO: 197 K/UL — SIGNIFICANT CHANGE UP (ref 150–400)
POTASSIUM SERPL-MCNC: 4 MMOL/L — SIGNIFICANT CHANGE UP (ref 3.5–5.3)
POTASSIUM SERPL-SCNC: 4 MMOL/L — SIGNIFICANT CHANGE UP (ref 3.5–5.3)
PTH-INTACT FLD-MCNC: 73 PG/ML — HIGH (ref 15–65)
RBC # BLD: 2.92 M/UL — LOW (ref 3.8–5.2)
RBC # FLD: 14.2 % — SIGNIFICANT CHANGE UP (ref 10.3–14.5)
SODIUM SERPL-SCNC: 138 MMOL/L — SIGNIFICANT CHANGE UP (ref 135–145)
WBC # BLD: 3.5 K/UL — LOW (ref 3.8–10.5)
WBC # FLD AUTO: 3.5 K/UL — LOW (ref 3.8–10.5)

## 2019-03-29 PROCEDURE — 99232 SBSQ HOSP IP/OBS MODERATE 35: CPT

## 2019-03-29 RX ORDER — ERYTHROPOIETIN 10000 [IU]/ML
10000 INJECTION, SOLUTION INTRAVENOUS; SUBCUTANEOUS ONCE
Qty: 0 | Refills: 0 | Status: COMPLETED | OUTPATIENT
Start: 2019-03-29 | End: 2019-03-29

## 2019-03-29 RX ORDER — INSULIN GLARGINE 100 [IU]/ML
5 INJECTION, SOLUTION SUBCUTANEOUS AT BEDTIME
Qty: 0 | Refills: 0 | Status: DISCONTINUED | OUTPATIENT
Start: 2019-03-29 | End: 2019-03-31

## 2019-03-29 RX ADMIN — DULOXETINE HYDROCHLORIDE 60 MILLIGRAM(S): 30 CAPSULE, DELAYED RELEASE ORAL at 13:55

## 2019-03-29 RX ADMIN — Medication 81 MILLIGRAM(S): at 13:55

## 2019-03-29 RX ADMIN — CLOPIDOGREL BISULFATE 75 MILLIGRAM(S): 75 TABLET, FILM COATED ORAL at 13:55

## 2019-03-29 RX ADMIN — SEVELAMER CARBONATE 800 MILLIGRAM(S): 2400 POWDER, FOR SUSPENSION ORAL at 18:55

## 2019-03-29 RX ADMIN — ATORVASTATIN CALCIUM 20 MILLIGRAM(S): 80 TABLET, FILM COATED ORAL at 21:29

## 2019-03-29 RX ADMIN — OXYCODONE AND ACETAMINOPHEN 1 TABLET(S): 5; 325 TABLET ORAL at 15:09

## 2019-03-29 RX ADMIN — SEVELAMER CARBONATE 800 MILLIGRAM(S): 2400 POWDER, FOR SUSPENSION ORAL at 15:09

## 2019-03-29 RX ADMIN — CINACALCET 60 MILLIGRAM(S): 30 TABLET, FILM COATED ORAL at 19:07

## 2019-03-29 RX ADMIN — INSULIN GLARGINE 5 UNIT(S): 100 INJECTION, SOLUTION SUBCUTANEOUS at 21:30

## 2019-03-29 RX ADMIN — OXYCODONE AND ACETAMINOPHEN 1 TABLET(S): 5; 325 TABLET ORAL at 01:01

## 2019-03-29 RX ADMIN — ERYTHROPOIETIN 10000 UNIT(S): 10000 INJECTION, SOLUTION INTRAVENOUS; SUBCUTANEOUS at 16:30

## 2019-03-29 RX ADMIN — Medication 3 UNIT(S): at 08:07

## 2019-03-29 RX ADMIN — OXYCODONE AND ACETAMINOPHEN 1 TABLET(S): 5; 325 TABLET ORAL at 01:30

## 2019-03-29 RX ADMIN — Medication 3 UNIT(S): at 13:53

## 2019-03-29 RX ADMIN — OXYCODONE AND ACETAMINOPHEN 1 TABLET(S): 5; 325 TABLET ORAL at 10:51

## 2019-03-29 NOTE — PROVIDER CONTACT NOTE (OTHER) - ASSESSMENT
Patient is alert,oriented  x4,OOB with standby assist.Patient verbalises understanding of the risks and benefits  of  Heparin but states she is walking around and does not want it.Patient does understand that she is at risk of developing DVT.

## 2019-03-29 NOTE — PROGRESS NOTE ADULT - PROBLEM SELECTOR PLAN 1
-Test BG ac and hs  -Decrease Lantus dose to 5 units qhs  -Continue Humalog 3 units ac meals  -Continue Humalog low dose correction scales ac and hs  -Plan discussed with pt/team.  Contact info: [phone] (24/7). pager [phone]

## 2019-03-30 LAB
ANION GAP SERPL CALC-SCNC: 17 MMOL/L — SIGNIFICANT CHANGE UP (ref 5–17)
BUN SERPL-MCNC: 9 MG/DL — SIGNIFICANT CHANGE UP (ref 7–23)
CALCIUM SERPL-MCNC: 7.9 MG/DL — LOW (ref 8.4–10.5)
CHLORIDE SERPL-SCNC: 94 MMOL/L — LOW (ref 96–108)
CO2 SERPL-SCNC: 25 MMOL/L — SIGNIFICANT CHANGE UP (ref 22–31)
CREAT SERPL-MCNC: 3.9 MG/DL — HIGH (ref 0.5–1.3)
GLUCOSE BLDC GLUCOMTR-MCNC: 130 MG/DL — HIGH (ref 70–99)
GLUCOSE BLDC GLUCOMTR-MCNC: 138 MG/DL — HIGH (ref 70–99)
GLUCOSE BLDC GLUCOMTR-MCNC: 77 MG/DL — SIGNIFICANT CHANGE UP (ref 70–99)
GLUCOSE BLDC GLUCOMTR-MCNC: 83 MG/DL — SIGNIFICANT CHANGE UP (ref 70–99)
GLUCOSE SERPL-MCNC: 156 MG/DL — HIGH (ref 70–99)
POTASSIUM SERPL-MCNC: 4.2 MMOL/L — SIGNIFICANT CHANGE UP (ref 3.5–5.3)
POTASSIUM SERPL-SCNC: 4.2 MMOL/L — SIGNIFICANT CHANGE UP (ref 3.5–5.3)
SODIUM SERPL-SCNC: 136 MMOL/L — SIGNIFICANT CHANGE UP (ref 135–145)

## 2019-03-30 PROCEDURE — 99222 1ST HOSP IP/OBS MODERATE 55: CPT

## 2019-03-30 RX ADMIN — OXYCODONE AND ACETAMINOPHEN 1 TABLET(S): 5; 325 TABLET ORAL at 01:30

## 2019-03-30 RX ADMIN — OXYCODONE AND ACETAMINOPHEN 1 TABLET(S): 5; 325 TABLET ORAL at 09:02

## 2019-03-30 RX ADMIN — CLOPIDOGREL BISULFATE 75 MILLIGRAM(S): 75 TABLET, FILM COATED ORAL at 12:58

## 2019-03-30 RX ADMIN — OXYCODONE AND ACETAMINOPHEN 1 TABLET(S): 5; 325 TABLET ORAL at 21:10

## 2019-03-30 RX ADMIN — OXYCODONE AND ACETAMINOPHEN 1 TABLET(S): 5; 325 TABLET ORAL at 00:52

## 2019-03-30 RX ADMIN — DULOXETINE HYDROCHLORIDE 60 MILLIGRAM(S): 30 CAPSULE, DELAYED RELEASE ORAL at 12:58

## 2019-03-30 RX ADMIN — ATORVASTATIN CALCIUM 20 MILLIGRAM(S): 80 TABLET, FILM COATED ORAL at 20:37

## 2019-03-30 RX ADMIN — OXYCODONE AND ACETAMINOPHEN 1 TABLET(S): 5; 325 TABLET ORAL at 07:03

## 2019-03-30 RX ADMIN — SEVELAMER CARBONATE 800 MILLIGRAM(S): 2400 POWDER, FOR SUSPENSION ORAL at 17:33

## 2019-03-30 RX ADMIN — OXYCODONE AND ACETAMINOPHEN 1 TABLET(S): 5; 325 TABLET ORAL at 20:37

## 2019-03-30 RX ADMIN — Medication 81 MILLIGRAM(S): at 12:58

## 2019-03-30 RX ADMIN — SEVELAMER CARBONATE 800 MILLIGRAM(S): 2400 POWDER, FOR SUSPENSION ORAL at 09:03

## 2019-03-30 RX ADMIN — INSULIN GLARGINE 5 UNIT(S): 100 INJECTION, SOLUTION SUBCUTANEOUS at 22:50

## 2019-03-30 RX ADMIN — SEVELAMER CARBONATE 800 MILLIGRAM(S): 2400 POWDER, FOR SUSPENSION ORAL at 12:58

## 2019-03-30 RX ADMIN — CINACALCET 60 MILLIGRAM(S): 30 TABLET, FILM COATED ORAL at 12:58

## 2019-03-30 RX ADMIN — Medication 50 MILLIGRAM(S): at 05:12

## 2019-03-31 LAB
ANION GAP SERPL CALC-SCNC: 14 MMOL/L — SIGNIFICANT CHANGE UP (ref 5–17)
BUN SERPL-MCNC: 5 MG/DL — LOW (ref 7–23)
CALCIUM SERPL-MCNC: 8 MG/DL — LOW (ref 8.4–10.5)
CHLORIDE SERPL-SCNC: 93 MMOL/L — LOW (ref 96–108)
CO2 SERPL-SCNC: 28 MMOL/L — SIGNIFICANT CHANGE UP (ref 22–31)
CREAT SERPL-MCNC: 3.05 MG/DL — HIGH (ref 0.5–1.3)
GLUCOSE BLDC GLUCOMTR-MCNC: 106 MG/DL — HIGH (ref 70–99)
GLUCOSE BLDC GLUCOMTR-MCNC: 114 MG/DL — HIGH (ref 70–99)
GLUCOSE BLDC GLUCOMTR-MCNC: 168 MG/DL — HIGH (ref 70–99)
GLUCOSE BLDC GLUCOMTR-MCNC: 198 MG/DL — HIGH (ref 70–99)
GLUCOSE BLDC GLUCOMTR-MCNC: 63 MG/DL — LOW (ref 70–99)
GLUCOSE BLDC GLUCOMTR-MCNC: 63 MG/DL — LOW (ref 70–99)
GLUCOSE BLDC GLUCOMTR-MCNC: 81 MG/DL — SIGNIFICANT CHANGE UP (ref 70–99)
GLUCOSE BLDC GLUCOMTR-MCNC: 87 MG/DL — SIGNIFICANT CHANGE UP (ref 70–99)
GLUCOSE BLDC GLUCOMTR-MCNC: 89 MG/DL — SIGNIFICANT CHANGE UP (ref 70–99)
GLUCOSE SERPL-MCNC: 120 MG/DL — HIGH (ref 70–99)
POTASSIUM SERPL-MCNC: 4.1 MMOL/L — SIGNIFICANT CHANGE UP (ref 3.5–5.3)
POTASSIUM SERPL-SCNC: 4.1 MMOL/L — SIGNIFICANT CHANGE UP (ref 3.5–5.3)
SODIUM SERPL-SCNC: 135 MMOL/L — SIGNIFICANT CHANGE UP (ref 135–145)

## 2019-03-31 PROCEDURE — 99231 SBSQ HOSP IP/OBS SF/LOW 25: CPT

## 2019-03-31 RX ORDER — INSULIN GLARGINE 100 [IU]/ML
4 INJECTION, SOLUTION SUBCUTANEOUS AT BEDTIME
Qty: 0 | Refills: 0 | Status: DISCONTINUED | OUTPATIENT
Start: 2019-03-31 | End: 2019-04-02

## 2019-03-31 RX ADMIN — SEVELAMER CARBONATE 800 MILLIGRAM(S): 2400 POWDER, FOR SUSPENSION ORAL at 09:40

## 2019-03-31 RX ADMIN — Medication 50 MILLIGRAM(S): at 05:11

## 2019-03-31 RX ADMIN — Medication 81 MILLIGRAM(S): at 09:40

## 2019-03-31 RX ADMIN — CINACALCET 60 MILLIGRAM(S): 30 TABLET, FILM COATED ORAL at 09:40

## 2019-03-31 RX ADMIN — INSULIN GLARGINE 4 UNIT(S): 100 INJECTION, SOLUTION SUBCUTANEOUS at 22:53

## 2019-03-31 RX ADMIN — CLOPIDOGREL BISULFATE 75 MILLIGRAM(S): 75 TABLET, FILM COATED ORAL at 09:40

## 2019-03-31 RX ADMIN — DULOXETINE HYDROCHLORIDE 60 MILLIGRAM(S): 30 CAPSULE, DELAYED RELEASE ORAL at 09:40

## 2019-03-31 RX ADMIN — Medication 3 UNIT(S): at 13:38

## 2019-03-31 RX ADMIN — ATORVASTATIN CALCIUM 20 MILLIGRAM(S): 80 TABLET, FILM COATED ORAL at 21:29

## 2019-03-31 RX ADMIN — SEVELAMER CARBONATE 800 MILLIGRAM(S): 2400 POWDER, FOR SUSPENSION ORAL at 12:09

## 2019-03-31 RX ADMIN — OXYCODONE AND ACETAMINOPHEN 1 TABLET(S): 5; 325 TABLET ORAL at 17:55

## 2019-03-31 RX ADMIN — SEVELAMER CARBONATE 800 MILLIGRAM(S): 2400 POWDER, FOR SUSPENSION ORAL at 17:01

## 2019-03-31 RX ADMIN — Medication 3 UNIT(S): at 09:40

## 2019-03-31 RX ADMIN — OXYCODONE AND ACETAMINOPHEN 1 TABLET(S): 5; 325 TABLET ORAL at 17:00

## 2019-03-31 NOTE — PROVIDER CONTACT NOTE (OTHER) - BACKGROUND
Patient admitted for Heart Failure, Diabetes Mellitus Type 1, Hypertension, End Stage Renal Disease on Dialysis, Hyperkalemia, Non ST Elevated Myocardial Infarction.

## 2019-03-31 NOTE — CHART NOTE - NSCHARTNOTEFT_GEN_A_CORE
NATHAN MEEKS    Notified by RN patient with blood sugar 63 repeat 63   patient asymptomatic       Vital Signs Last 24 Hrs  T(C): 36.7 (31 Mar 2019 16:58), Max: 36.8 (30 Mar 2019 21:04)  T(F): 98.1 (31 Mar 2019 16:58), Max: 98.3 (30 Mar 2019 21:04)  HR: 80 (31 Mar 2019 16:58) (74 - 81)  BP: 164/82 (31 Mar 2019 16:58) (139/76 - 167/81)  BP(mean): --  RR: 18 (31 Mar 2019 16:58) (18 - 18)  SpO2: 99% (31 Mar 2019 16:58) (98% - 100%)    Interventions taken   Hold premeal insulin  follow protocol   awaiting Dr Viera call back   will sign out to night SARAH DEE

## 2019-03-31 NOTE — PROVIDER CONTACT NOTE (OTHER) - RECOMMENDATIONS
Assess patient. Review patient's orders, lab results and Blood Glucose Fingertip Results. Follow Hypoglycemia Protocol.

## 2019-03-31 NOTE — PROVIDER CONTACT NOTE (OTHER) - ASSESSMENT
Patient asymptomatic. Patient Alert and Oriented times Four. Patient denies dizziness and lightheadedness. Patient has an active order for Insulin Lispro 3 Units Subcutaneous three times a deal with meals.

## 2019-03-31 NOTE — PROGRESS NOTE ADULT - PROBLEM SELECTOR PLAN 1
-Test BG ac and hs  -Decrease Lantus dose to 4 units qhs  -Continue Humalog 3 units ac meals  -Continue Humalog low dose correction scales ac and hs  -Plan discussed with pt/team.  Contact info: [phone] (24/7). pager [phone]

## 2019-04-01 LAB
ANION GAP SERPL CALC-SCNC: 17 MMOL/L — SIGNIFICANT CHANGE UP (ref 5–17)
BUN SERPL-MCNC: 10 MG/DL — SIGNIFICANT CHANGE UP (ref 7–23)
CALCIUM SERPL-MCNC: 7.9 MG/DL — LOW (ref 8.4–10.5)
CHLORIDE SERPL-SCNC: 92 MMOL/L — LOW (ref 96–108)
CO2 SERPL-SCNC: 27 MMOL/L — SIGNIFICANT CHANGE UP (ref 22–31)
CREAT SERPL-MCNC: 4.68 MG/DL — HIGH (ref 0.5–1.3)
GLUCOSE BLDC GLUCOMTR-MCNC: 103 MG/DL — HIGH (ref 70–99)
GLUCOSE BLDC GLUCOMTR-MCNC: 122 MG/DL — HIGH (ref 70–99)
GLUCOSE BLDC GLUCOMTR-MCNC: 138 MG/DL — HIGH (ref 70–99)
GLUCOSE BLDC GLUCOMTR-MCNC: 166 MG/DL — HIGH (ref 70–99)
GLUCOSE BLDC GLUCOMTR-MCNC: 84 MG/DL — SIGNIFICANT CHANGE UP (ref 70–99)
GLUCOSE BLDC GLUCOMTR-MCNC: 94 MG/DL — SIGNIFICANT CHANGE UP (ref 70–99)
GLUCOSE SERPL-MCNC: 187 MG/DL — HIGH (ref 70–99)
POTASSIUM SERPL-MCNC: 4.1 MMOL/L — SIGNIFICANT CHANGE UP (ref 3.5–5.3)
POTASSIUM SERPL-SCNC: 4.1 MMOL/L — SIGNIFICANT CHANGE UP (ref 3.5–5.3)
SODIUM SERPL-SCNC: 136 MMOL/L — SIGNIFICANT CHANGE UP (ref 135–145)

## 2019-04-01 PROCEDURE — 99232 SBSQ HOSP IP/OBS MODERATE 35: CPT

## 2019-04-01 RX ORDER — INSULIN LISPRO 100/ML
2 VIAL (ML) SUBCUTANEOUS
Qty: 0 | Refills: 0 | Status: DISCONTINUED | OUTPATIENT
Start: 2019-04-01 | End: 2019-04-03

## 2019-04-01 RX ADMIN — Medication 2 UNIT(S): at 18:10

## 2019-04-01 RX ADMIN — Medication 1: at 09:24

## 2019-04-01 RX ADMIN — Medication 81 MILLIGRAM(S): at 09:25

## 2019-04-01 RX ADMIN — OXYCODONE AND ACETAMINOPHEN 1 TABLET(S): 5; 325 TABLET ORAL at 04:25

## 2019-04-01 RX ADMIN — CLOPIDOGREL BISULFATE 75 MILLIGRAM(S): 75 TABLET, FILM COATED ORAL at 09:25

## 2019-04-01 RX ADMIN — Medication 50 MILLIGRAM(S): at 05:21

## 2019-04-01 RX ADMIN — OXYCODONE AND ACETAMINOPHEN 1 TABLET(S): 5; 325 TABLET ORAL at 04:00

## 2019-04-01 RX ADMIN — DULOXETINE HYDROCHLORIDE 60 MILLIGRAM(S): 30 CAPSULE, DELAYED RELEASE ORAL at 09:26

## 2019-04-01 RX ADMIN — ATORVASTATIN CALCIUM 20 MILLIGRAM(S): 80 TABLET, FILM COATED ORAL at 20:51

## 2019-04-01 RX ADMIN — Medication 2 UNIT(S): at 14:07

## 2019-04-01 RX ADMIN — Medication 3 UNIT(S): at 09:24

## 2019-04-01 RX ADMIN — CINACALCET 60 MILLIGRAM(S): 30 TABLET, FILM COATED ORAL at 09:25

## 2019-04-01 RX ADMIN — INSULIN GLARGINE 4 UNIT(S): 100 INJECTION, SOLUTION SUBCUTANEOUS at 23:23

## 2019-04-01 RX ADMIN — SEVELAMER CARBONATE 800 MILLIGRAM(S): 2400 POWDER, FOR SUSPENSION ORAL at 09:25

## 2019-04-01 NOTE — DIETITIAN INITIAL EVALUATION ADULT. - OTHER INFO
seen for length of stay, admitted for chest pain, SOB, consuming 25% of hospital meals in addition to what her  brings in for her, denies nausea/vomit, denies difficulty chewing /swallow. last BM yesterday. NKFA. IBW +/- 10%= 120pounds. pt refused supplements, she does not like them. refused need for diet ed

## 2019-04-01 NOTE — DIETITIAN INITIAL EVALUATION ADULT. - PHYSICAL APPEARANCE
underweight/Patient consented to nutrition focused physical exam. Findings as follows: while her dry weight indicates a low BMI her assessment including physical exam does not support malnourishment.

## 2019-04-01 NOTE — DIETITIAN INITIAL EVALUATION ADULT. - ORAL INTAKE PTA
eggs and toast for breakfast, turkey sandwich for lunch and chicken for dinner, she does not eat veg or fruit./fair

## 2019-04-01 NOTE — PROGRESS NOTE ADULT - PROBLEM SELECTOR PLAN 1
-Test BG ac and hs  -C/w Lantus dose 4 units qhs  -Decreased Humalog to 2 units ac meals  -Continue Humalog low dose correction scales ac and hs  -Plan discussed with pt/team.  Contact info: [phone] (24/7). pager [phone]

## 2019-04-01 NOTE — DIETITIAN INITIAL EVALUATION ADULT. - FACTORS AFF FOOD INTAKE
does not like hospital food cause we don't put anything on it, her  brings her in foods like pasta and meatballs and ham sandwiches, when I offered to provide the same for her here she refused, reinforced the need for pt to avoid red sauce on her pasta.

## 2019-04-01 NOTE — DIETITIAN INITIAL EVALUATION ADULT. - PROBLEM SELECTOR PLAN 6
- pt reportedly makes only a little urine  - holding lasix 2/2 hypotension that was fluid responsive

## 2019-04-01 NOTE — DIETITIAN INITIAL EVALUATION ADULT. - PERTINENT MEDS FT
aspirin enteric coated 81  atorvastatin 20  clopidogrel Tablet 75  dextrose 40% Gel 15 PRN  dextrose 5%. 1000  dextrose 50% Injectable 12.5  dextrose 50% Injectable 25  DULoxetine 60  glucagon  Injectable 1 PRN  heparin  Injectable 5000  insulin glargine Injectable (LANTUS) 4  insulin lispro (HumaLOG) corrective regimen sliding scale   insulin lispro (HumaLOG) corrective regimen sliding scale   insulin lispro Injectable (HumaLOG) 2  metoprolol succinate ER 50  oxyCODONE    5 mG/acetaminophen 325 mG 1 PRN

## 2019-04-02 ENCOUNTER — APPOINTMENT (OUTPATIENT)
Dept: THORACIC SURGERY | Facility: CLINIC | Age: 64
End: 2019-04-02

## 2019-04-02 LAB
ANION GAP SERPL CALC-SCNC: 16 MMOL/L — SIGNIFICANT CHANGE UP (ref 5–17)
BUN SERPL-MCNC: 5 MG/DL — LOW (ref 7–23)
CALCIUM SERPL-MCNC: 8.2 MG/DL — LOW (ref 8.4–10.5)
CHLORIDE SERPL-SCNC: 95 MMOL/L — LOW (ref 96–108)
CO2 SERPL-SCNC: 27 MMOL/L — SIGNIFICANT CHANGE UP (ref 22–31)
CREAT SERPL-MCNC: 3.25 MG/DL — HIGH (ref 0.5–1.3)
FUNGUS SPEC QL CULT: SIGNIFICANT CHANGE UP
GLUCOSE BLDC GLUCOMTR-MCNC: 114 MG/DL — HIGH (ref 70–99)
GLUCOSE BLDC GLUCOMTR-MCNC: 206 MG/DL — HIGH (ref 70–99)
GLUCOSE BLDC GLUCOMTR-MCNC: 209 MG/DL — HIGH (ref 70–99)
GLUCOSE BLDC GLUCOMTR-MCNC: 248 MG/DL — HIGH (ref 70–99)
GLUCOSE SERPL-MCNC: 145 MG/DL — HIGH (ref 70–99)
HCT VFR BLD CALC: 34 % — LOW (ref 34.5–45)
HGB BLD-MCNC: 11.9 G/DL — SIGNIFICANT CHANGE UP (ref 11.5–15.5)
MAGNESIUM SERPL-MCNC: 2.2 MG/DL — SIGNIFICANT CHANGE UP (ref 1.6–2.6)
MCHC RBC-ENTMCNC: 35 GM/DL — SIGNIFICANT CHANGE UP (ref 32–36)
MCHC RBC-ENTMCNC: 36.4 PG — HIGH (ref 27–34)
MCV RBC AUTO: 104 FL — HIGH (ref 80–100)
PHOSPHATE SERPL-MCNC: 2.5 MG/DL — SIGNIFICANT CHANGE UP (ref 2.5–4.5)
PLATELET # BLD AUTO: 243 K/UL — SIGNIFICANT CHANGE UP (ref 150–400)
POTASSIUM SERPL-MCNC: 3.8 MMOL/L — SIGNIFICANT CHANGE UP (ref 3.5–5.3)
POTASSIUM SERPL-SCNC: 3.8 MMOL/L — SIGNIFICANT CHANGE UP (ref 3.5–5.3)
RBC # BLD: 3.28 M/UL — LOW (ref 3.8–5.2)
RBC # FLD: 14.7 % — HIGH (ref 10.3–14.5)
SODIUM SERPL-SCNC: 138 MMOL/L — SIGNIFICANT CHANGE UP (ref 135–145)
WBC # BLD: 4.6 K/UL — SIGNIFICANT CHANGE UP (ref 3.8–10.5)
WBC # FLD AUTO: 4.6 K/UL — SIGNIFICANT CHANGE UP (ref 3.8–10.5)

## 2019-04-02 PROCEDURE — 99232 SBSQ HOSP IP/OBS MODERATE 35: CPT

## 2019-04-02 PROCEDURE — 99231 SBSQ HOSP IP/OBS SF/LOW 25: CPT

## 2019-04-02 PROCEDURE — 93306 TTE W/DOPPLER COMPLETE: CPT | Mod: 26

## 2019-04-02 RX ORDER — INSULIN DETEMIR 100/ML (3)
4 INSULIN PEN (ML) SUBCUTANEOUS AT BEDTIME
Qty: 0 | Refills: 0 | Status: DISCONTINUED | OUTPATIENT
Start: 2019-04-02 | End: 2019-04-04

## 2019-04-02 RX ADMIN — Medication 2: at 18:21

## 2019-04-02 RX ADMIN — Medication 2 UNIT(S): at 09:35

## 2019-04-02 RX ADMIN — Medication 50 MILLIGRAM(S): at 05:12

## 2019-04-02 RX ADMIN — Medication 81 MILLIGRAM(S): at 12:03

## 2019-04-02 RX ADMIN — OXYCODONE AND ACETAMINOPHEN 1 TABLET(S): 5; 325 TABLET ORAL at 05:55

## 2019-04-02 RX ADMIN — DULOXETINE HYDROCHLORIDE 60 MILLIGRAM(S): 30 CAPSULE, DELAYED RELEASE ORAL at 12:03

## 2019-04-02 RX ADMIN — Medication 2: at 13:35

## 2019-04-02 RX ADMIN — OXYCODONE AND ACETAMINOPHEN 1 TABLET(S): 5; 325 TABLET ORAL at 22:34

## 2019-04-02 RX ADMIN — Medication 4 UNIT(S): at 22:32

## 2019-04-02 RX ADMIN — Medication 2 UNIT(S): at 13:36

## 2019-04-02 RX ADMIN — ATORVASTATIN CALCIUM 20 MILLIGRAM(S): 80 TABLET, FILM COATED ORAL at 21:04

## 2019-04-02 RX ADMIN — CLOPIDOGREL BISULFATE 75 MILLIGRAM(S): 75 TABLET, FILM COATED ORAL at 12:03

## 2019-04-02 RX ADMIN — OXYCODONE AND ACETAMINOPHEN 1 TABLET(S): 5; 325 TABLET ORAL at 05:14

## 2019-04-02 RX ADMIN — Medication 2 UNIT(S): at 18:21

## 2019-04-02 RX ADMIN — OXYCODONE AND ACETAMINOPHEN 1 TABLET(S): 5; 325 TABLET ORAL at 23:10

## 2019-04-02 NOTE — PROGRESS NOTE ADULT - PROBLEM SELECTOR PLAN 1
-Test BG ac and hs  -Change Lantus to Levemir 4 units qhs due shorter half life decreasing risk for hypoglycemia on pts with renal failure.   -C/w Humalog to 2 units ac meals  -Continue Humalog low dose correction scales ac and hs  -Plan discussed with pt/team.  Contact info: [phone] (24/7). pager [phone]

## 2019-04-03 LAB
ANION GAP SERPL CALC-SCNC: 17 MMOL/L — SIGNIFICANT CHANGE UP (ref 5–17)
APTT BLD: 30.6 SEC — SIGNIFICANT CHANGE UP (ref 27.5–36.3)
BUN SERPL-MCNC: 18 MG/DL — SIGNIFICANT CHANGE UP (ref 7–23)
CALCIUM SERPL-MCNC: 7.7 MG/DL — LOW (ref 8.4–10.5)
CHLORIDE SERPL-SCNC: 94 MMOL/L — LOW (ref 96–108)
CO2 SERPL-SCNC: 26 MMOL/L — SIGNIFICANT CHANGE UP (ref 22–31)
CREAT SERPL-MCNC: 4.94 MG/DL — HIGH (ref 0.5–1.3)
GLUCOSE BLDC GLUCOMTR-MCNC: 132 MG/DL — HIGH (ref 70–99)
GLUCOSE BLDC GLUCOMTR-MCNC: 188 MG/DL — HIGH (ref 70–99)
GLUCOSE BLDC GLUCOMTR-MCNC: 206 MG/DL — HIGH (ref 70–99)
GLUCOSE BLDC GLUCOMTR-MCNC: 213 MG/DL — HIGH (ref 70–99)
GLUCOSE BLDC GLUCOMTR-MCNC: 434 MG/DL — HIGH (ref 70–99)
GLUCOSE BLDC GLUCOMTR-MCNC: 565 MG/DL — CRITICAL HIGH (ref 70–99)
GLUCOSE SERPL-MCNC: 252 MG/DL — HIGH (ref 70–99)
HCT VFR BLD CALC: 34.9 % — SIGNIFICANT CHANGE UP (ref 34.5–45)
HGB BLD-MCNC: 11 G/DL — LOW (ref 11.5–15.5)
INR BLD: 1.02 RATIO — SIGNIFICANT CHANGE UP (ref 0.88–1.16)
MCHC RBC-ENTMCNC: 31.5 GM/DL — LOW (ref 32–36)
MCHC RBC-ENTMCNC: 32.6 PG — SIGNIFICANT CHANGE UP (ref 27–34)
MCV RBC AUTO: 103 FL — HIGH (ref 80–100)
PLATELET # BLD AUTO: 219 K/UL — SIGNIFICANT CHANGE UP (ref 150–400)
POTASSIUM SERPL-MCNC: 4.1 MMOL/L — SIGNIFICANT CHANGE UP (ref 3.5–5.3)
POTASSIUM SERPL-SCNC: 4.1 MMOL/L — SIGNIFICANT CHANGE UP (ref 3.5–5.3)
PROTHROM AB SERPL-ACNC: 11.7 SEC — SIGNIFICANT CHANGE UP (ref 10–12.9)
RBC # BLD: 3.38 M/UL — LOW (ref 3.8–5.2)
RBC # FLD: 14.4 % — SIGNIFICANT CHANGE UP (ref 10.3–14.5)
SODIUM SERPL-SCNC: 137 MMOL/L — SIGNIFICANT CHANGE UP (ref 135–145)
WBC # BLD: 3.7 K/UL — LOW (ref 3.8–10.5)
WBC # FLD AUTO: 3.7 K/UL — LOW (ref 3.8–10.5)

## 2019-04-03 PROCEDURE — 99221 1ST HOSP IP/OBS SF/LOW 40: CPT | Mod: 24

## 2019-04-03 PROCEDURE — 99232 SBSQ HOSP IP/OBS MODERATE 35: CPT

## 2019-04-03 PROCEDURE — ZZZZZ: CPT

## 2019-04-03 RX ORDER — INSULIN LISPRO 100/ML
3 VIAL (ML) SUBCUTANEOUS
Qty: 0 | Refills: 0 | Status: DISCONTINUED | OUTPATIENT
Start: 2019-04-03 | End: 2019-04-04

## 2019-04-03 RX ADMIN — ATORVASTATIN CALCIUM 20 MILLIGRAM(S): 80 TABLET, FILM COATED ORAL at 20:57

## 2019-04-03 RX ADMIN — Medication 3 UNIT(S): at 18:32

## 2019-04-03 RX ADMIN — Medication 50 MILLIGRAM(S): at 13:17

## 2019-04-03 RX ADMIN — CLOPIDOGREL BISULFATE 75 MILLIGRAM(S): 75 TABLET, FILM COATED ORAL at 13:17

## 2019-04-03 RX ADMIN — Medication 2: at 18:32

## 2019-04-03 RX ADMIN — Medication 2 UNIT(S): at 14:26

## 2019-04-03 RX ADMIN — Medication 81 MILLIGRAM(S): at 13:17

## 2019-04-03 RX ADMIN — DULOXETINE HYDROCHLORIDE 60 MILLIGRAM(S): 30 CAPSULE, DELAYED RELEASE ORAL at 13:17

## 2019-04-03 RX ADMIN — Medication 2 UNIT(S): at 08:10

## 2019-04-03 RX ADMIN — Medication 4 UNIT(S): at 23:36

## 2019-04-03 RX ADMIN — Medication 1: at 08:10

## 2019-04-03 RX ADMIN — Medication 4: at 22:28

## 2019-04-03 NOTE — CONSULT NOTE ADULT - SUBJECTIVE AND OBJECTIVE BOX
History of Present Illness:  63y Female    Past Medical History  COPD (chronic obstructive pulmonary disease)  Localized enlarged lymph nodes  CHF (congestive heart failure): EF 40-45%  Subclavian vein stenosis, left: s/p stent  Cellulitis: 2015 left foot  DKA, type 1: 1/2015  ACS (acute coronary syndrome): 1/2015 - cath revealed 100% ostial stenosis not amenable to PCI - medical management  MI (myocardial infarction): 3/2015 - s/p cardiac cath - no intervention, medical management  MI, old  TIA (transient ischemic attack): x 2 - 8-9 years ago prior to ASD/VSD repair  PAD (peripheral artery disease)  HLD (hyperlipidemia)  HTN (hypertension)  ESRD (end stage renal disease) on dialysis: T/TH/SAT  Type 1 diabetes mellitus  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  CAD (coronary artery disease): s/p stents  Myocardial infarct  Murmur, cardiac  Gout: past  CVA (cerebral infarction): with no residual, 8 yrs ago, prior to heart surgery - ST memory loss  Peripheral vascular disease: occluded left fem-pop bypass 5/2015  Coronary artery disease  Diabetes mellitus type 1: Insulin Dependent -  ESRD (end stage renal disease): dialysis  M, tue, thursday, saturday  TIA (transient ischemic attack)  x 3 2005 2 nt to VSD/ASD repair  Diabetes mellitus type II  HTN (hypertension), benign  Hyperlipidemia      Past Surgical History  Status post device closure of ASD: &quot;clamshell&quot;  History of cardiac catheterization: 1/2015 - no intervention  S/P cholecystectomy  AV fistula: Left UE  S/P femoral-popliteal bypass surgery: L and R in 2013 with graft; 5/2015 CFA angioplasty left and ileofemoral endarterectomywith vein patch angioplasty of left fem-pop bypass graft  Multiple vascular surgery both leg, left fempop bypass revision 11/2015  S/P femoral-popliteal bypass surgery: right  march 2013  Stented coronary artery: Metal plug to artery  AV (arteriovenous fistula): Left AV graft; revision with stent placement 2-3 years ago  S/P cholecystectomy  ASD OR VSD  Repair 2005: mesh      MEDICATIONS  (STANDING):  aspirin enteric coated 81 milliGRAM(s) Oral daily  atorvastatin 20 milliGRAM(s) Oral at bedtime  clopidogrel Tablet 75 milliGRAM(s) Oral daily  dextrose 5%. 1000 milliLiter(s) (50 mL/Hr) IV Continuous <Continuous>  dextrose 50% Injectable 12.5 Gram(s) IV Push once  dextrose 50% Injectable 25 Gram(s) IV Push once  DULoxetine 60 milliGRAM(s) Oral daily  heparin  Injectable 5000 Unit(s) SubCutaneous every 8 hours  insulin detemir injectable (LEVEMIR) 4 Unit(s) SubCutaneous at bedtime  insulin lispro (HumaLOG) corrective regimen sliding scale   SubCutaneous three times a day before meals  insulin lispro (HumaLOG) corrective regimen sliding scale   SubCutaneous at bedtime  insulin lispro Injectable (HumaLOG) 3 Unit(s) SubCutaneous three times a day with meals  metoprolol succinate ER 50 milliGRAM(s) Oral daily    MEDICATIONS  (PRN):  dextrose 40% Gel 15 Gram(s) Oral once PRN Blood Glucose LESS THAN 70 milliGRAM(s)/deciliter  glucagon  Injectable 1 milliGRAM(s) IntraMuscular once PRN Glucose LESS THAN 70 milligrams/deciliter  oxyCODONE    5 mG/acetaminophen 325 mG 1 Tablet(s) Oral every 4 hours PRN Moderate Pain (4 - 6)    Antiplatelet therapy:                           Last dose/amt:    Allergies: No Known Allergies      SOCIAL HISTORY:  Smoker: [ ] Yes  [ ] No        PACK YEARS:                         WHEN QUIT?  ETOH use: [ ] Yes  [ ] No              FREQUENCY / QUANTITY:  Ilicit Drug use:  [ ] Yes  [ ] No  Occupation:  Live with:  Assist device use:    Relevant Family History  FAMILY HISTORY:  Family history of smoking  Family history of hypertension  Family history of cancer (Sibling)      Review of Systems  GENERAL:  Fevers[] chills[] sweats[] fatigue[] weight loss[] weight gain []                                        NEURO:  parathesias[] seizures []  syncope []  confusion []                                                                                  EYES: glasses[]  blurry vision[]  discharge[] pain[] glaucoma []                                                                            ENMT:  difficulty hearing []  vertigo[]  dysphagia[] epistaxis[] recent dental work []                                      CV:  chest pain[] palpitations[] LIPSCOMB [] diaphoresis [] edema[]                                                                                             RESPIRATORY:  wheezing[] SOB[] cough [] sputum[] hemoptysis[]                                                                    GI:  nausea[]  vomiting []  diarrhea[] constipation [] melena []                                                                        : hematuria[ ]  dysuria[ ] urgency[] incontinence[]                                                                                              MUSKULOSKELETAL:  arthritis[ ]  joint swelling [ ] muscle weakness [ ]                                                                  SKIN/BREAST:  rash[ ] itching [ ]  hair loss[ ] masses[ ]                                                                                                PSYCH:  dementia [ ] depresion [ ] anxiety[ ]                                                                                                                  HEME/LYMPH:  bruises easily[ ] enlarged lymph nodes[ ] tender lymph nodes[ ]                                                 ENDOCRINE:  cold intolerance[ ] heat intolerance[ ] polydipsia[ ]                                                                              PHYSICAL EXAM  Vital Signs Last 24 Hrs  T(C): 36.5 (03 Apr 2019 12:58), Max: 36.6 (02 Apr 2019 21:03)  T(F): 97.7 (03 Apr 2019 12:58), Max: 97.9 (02 Apr 2019 21:03)  HR: 85 (03 Apr 2019 12:58) (71 - 85)  BP: 147/76 (03 Apr 2019 12:58) (130/81 - 159/87)  BP(mean): --  RR: 18 (03 Apr 2019 12:58) (18 - 18)  SpO2: 99% (03 Apr 2019 12:58) (96% - 100%)    General: Well nourished, well developed, no acute distress.                                                         Neuro: Normal exam oriented to person/place & time with no focal motor or sensory  deficits.                    Eyes: Normal exam of conjunctiva & lids, pupils equally reactive.   ENT: Normal exam of nasal/oral mucosa with absence of cyanosis.   Neck: Normal exam of jugular veins, trachea & thyroid.   Chest: Normal lung exam with good air movement absence of wheezes, rales, or rhonchi:                                                                          CV:  Auscultation: normal [ ] S3[ ] S4[ ] Irregular [ ] Rub[ ] Clicks[ ]  Murmurs none:[ ]systolic [ ]  diastolic [ ] holosystolic [ ]  Carotids: No Bruits[ ] Other____________ Abdominal Aorta: normal [ ] nonpalpable[ ]                                                                         GI: Normal exam of abdomen, liver & spleen with no noted masses or tenderness.                                                                                              Extremities: Normal no evidence of cyanosis or deformity Edema: none[ ]trace[ ]1+[ ]2+[ ]3+[ ]4+[ ]  Lower Extremity Pulses: Right[ ] Left[ ]Varicosities[ ]  SKIN : Normal exam to inspection & palpation.                                                           LABS:                        11.0   3.7   )-----------( 219      ( 03 Apr 2019 06:48 )             34.9     04-03    137  |  94<L>  |  18  ----------------------------<  252<H>  4.1   |  26  |  4.94<H>    Ca    7.7<L>      03 Apr 2019 06:47  Phos  2.5     04-02  Mg     2.2     04-02      PT/INR - ( 03 Apr 2019 06:48 )   PT: 11.7 sec;   INR: 1.02 ratio         PTT - ( 03 Apr 2019 06:48 )  PTT:30.6 sec            Cardiac Cath:    TTE / ALIA:    Assessment:  63y Female presents with Congestive heart failure, unspecified HF chronicity, unspecified heart failure type      Plan: History of Present Illness:  63y Female c hx CAD (Cath Feb '19 showing 99% RCA, 100% RPLS, 100% Cx) s/p >8 stents/brachytherapy, ESRD (on HD M/T/T/Sa), DM1, CHF (EF ?30% per last [facility]), mod MR/mild AS, TIA, severe PVD s/p b/l fempop bypass, subclavian vein stenosis s/p stent, COPD not on O2, gout, recent admissions for DKA requiring insulin gtt/PNA, pw NSTEMI, hyperkalemia, and hypotension of unclear etiology,     Referred for CABG/MVR after cath revealed CAD and mitral regurg    Past Medical History  COPD (chronic obstructive pulmonary disease)  Localized enlarged lymph nodes  CHF (congestive heart failure): EF 40-45%  Subclavian vein stenosis, left: s/p stent  Cellulitis: 2015 left foot  DKA, type 1: 1/2015  ACS (acute coronary syndrome): 1/2015 - cath revealed 100% ostial stenosis not amenable to PCI - medical management  MI (myocardial infarction): 3/2015 - s/p cardiac cath - no intervention, medical management  MI, old  TIA (transient ischemic attack): x 2 - 8-9 years ago prior to ASD/VSD repair  PAD (peripheral artery disease)  HLD (hyperlipidemia)  HTN (hypertension)  ESRD (end stage renal disease) on dialysis: T/TH/SAT  Type 1 diabetes mellitus  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  CAD (coronary artery disease): s/p stents  Myocardial infarct  Murmur, cardiac  Gout: past  CVA (cerebral infarction): with no residual, 8 yrs ago, prior to heart surgery - ST memory loss  Peripheral vascular disease: occluded left fem-pop bypass 5/2015  Coronary artery disease  Diabetes mellitus type 1: Insulin Dependent -  ESRD (end stage renal disease): dialysis  M, tue, thursday, saturday  TIA (transient ischemic attack)  x 3 2005 2 nt to VSD/ASD repair  Diabetes mellitus type II  HTN (hypertension), benign  Hyperlipidemia      Past Surgical History  Status post device closure of ASD: &quot;clamshell&quot;  History of cardiac catheterization: 1/2015 - no intervention  S/P cholecystectomy  AV fistula: Left UE  S/P femoral-popliteal bypass surgery: L and R in 2013 with graft; 5/2015 CFA angioplasty left and ileofemoral endarterectomywith vein patch angioplasty of left fem-pop bypass graft  Multiple vascular surgery both leg, left fempop bypass revision 11/2015  S/P femoral-popliteal bypass surgery: right  march 2013  Stented coronary artery: Metal plug to artery  AV (arteriovenous fistula): Left AV graft; revision with stent placement 2-3 years ago  S/P cholecystectomy  ASD OR VSD  Repair 2005: mesh      MEDICATIONS  (STANDING):  aspirin enteric coated 81 milliGRAM(s) Oral daily  atorvastatin 20 milliGRAM(s) Oral at bedtime  clopidogrel Tablet 75 milliGRAM(s) Oral daily  dextrose 5%. 1000 milliLiter(s) (50 mL/Hr) IV Continuous <Continuous>  dextrose 50% Injectable 12.5 Gram(s) IV Push once  dextrose 50% Injectable 25 Gram(s) IV Push once  DULoxetine 60 milliGRAM(s) Oral daily  heparin  Injectable 5000 Unit(s) SubCutaneous every 8 hours  insulin detemir injectable (LEVEMIR) 4 Unit(s) SubCutaneous at bedtime  insulin lispro (HumaLOG) corrective regimen sliding scale   SubCutaneous three times a day before meals  insulin lispro (HumaLOG) corrective regimen sliding scale   SubCutaneous at bedtime  insulin lispro Injectable (HumaLOG) 3 Unit(s) SubCutaneous three times a day with meals  metoprolol succinate ER 50 milliGRAM(s) Oral daily    MEDICATIONS  (PRN):  dextrose 40% Gel 15 Gram(s) Oral once PRN Blood Glucose LESS THAN 70 milliGRAM(s)/deciliter  glucagon  Injectable 1 milliGRAM(s) IntraMuscular once PRN Glucose LESS THAN 70 milligrams/deciliter  oxyCODONE    5 mG/acetaminophen 325 mG 1 Tablet(s) Oral every 4 hours PRN Moderate Pain (4 - 6)    Antiplatelet therapy:   Plavix 75 mg daily                  Last dose/amt:  4/3    Allergies: No Known Allergies      SOCIAL HISTORY:  Smoker: [x ] Yes  [ ] No        PACK YEARS:      30                   WHEN QUIT?  ETOH use: [ ] Yes  [ x] No              FREQUENCY / QUANTITY:  Ilicit Drug use:  [ ] Yes  [x ] No  Occupation: former manager [employer]  Live with: spouse, daughter    Relevant Family History  FAMILY HISTORY:  Family history of smoking  Family history of hypertension  Family history of cancer (Sibling)      Review of Systems  GENERAL:  Fevers[] chills[] sweats[] fatigue[] weight loss[] weight gain []                                        NEURO:  parathesias[] seizures []  syncope []  confusion []                                                                                  EYES: glasses[]  blurry vision[]  discharge[] pain[] glaucoma []                                                                            ENMT:  difficulty hearing []  vertigo[]  dysphagia[] epistaxis[] recent dental work []                                      CV:  chest pain[x] palpitations[] LIPSCOMB [] diaphoresis [] edema[]                                                                                             RESPIRATORY:  wheezing[] SOB[x] cough [x] sputum[] hemoptysis[]                                                                    GI:  nausea[]  vomiting []  diarrhea[] constipation [] melena []                                                                        : hematuria[ ]  dysuria[ ] urgency[] incontinence[]                                                                                              MUSKULOSKELETAL:  arthritis[ ]  joint swelling [ ] muscle weakness [ ]                                                                  SKIN/BREAST:  rash[ ] itching [ ]  hair loss[ ] masses[ ]                                                                                                PSYCH:  dementia [ ] depresion [ ] anxiety[ ]                                                                                                                  HEME/LYMPH:  bruises easily[ ] enlarged lymph nodes[ ] tender lymph nodes[ ]                                                 ENDOCRINE:  cold intolerance[ ] heat intolerance[ ] polydipsia[ ]                                                                              PHYSICAL EXAM  Vital Signs Last 24 Hrs  T(C): 36.5 (03 Apr 2019 12:58), Max: 36.6 (02 Apr 2019 21:03)  T(F): 97.7 (03 Apr 2019 12:58), Max: 97.9 (02 Apr 2019 21:03)  HR: 85 (03 Apr 2019 12:58) (71 - 85)  BP: 147/76 (03 Apr 2019 12:58) (130/81 - 159/87)  BP(mean): --  RR: 18 (03 Apr 2019 12:58) (18 - 18)  SpO2: 99% (03 Apr 2019 12:58) (96% - 100%)    General: well developed, no acute distress.                                                         Neuro: Normal exam oriented to person/place & time with no focal motor or sensory  deficits.                    Eyes: Normal exam of conjunctiva & lids, pupils equally reactive.   ENT: Normal exam of nasal/oral mucosa with absence of cyanosis.   Neck: Normal exam of jugular veins, trachea & thyroid.   Chest: Normal lung exam with good air movement absence of wheezes, rales, or rhonchi:                                                                          CV:  Auscultation: normal [x ] S3[ ] S4[ ] Irregular [ ] Rub[ ] Clicks[ ]  Murmurs none:[ ]systolic [ x]  diastolic [ ] holosystolic [ ]  Carotids: No Bruits[x ] Other____________ Abdominal Aorta: normal [ ] nonpalpable[ ]                                                                         GI: Normal exam of abdomen, liver & spleen with no noted masses or tenderness.                                                                                              Extremities: Normal no evidence of cyanosis or deformity Edema: none[ ]trace[ ]1+[x ]2+[ ]3+[ ]4+[ ]  Lower Extremity Pulses: Right[ ] Left[ ]Varicosities[ ]  SKIN : Normal exam to inspection & palpation.                                                           LABS:                        11.0   3.7   )-----------( 219      ( 03 Apr 2019 06:48 )             34.9     04-03    137  |  94<L>  |  18  ----------------------------<  252<H>  4.1   |  26  |  4.94<H>    Ca    7.7<L>      03 Apr 2019 06:47  Phos  2.5     04-02  Mg     2.2     04-02      PT/INR - ( 03 Apr 2019 06:48 )   PT: 11.7 sec;   INR: 1.02 ratio         PTT - ( 03 Apr 2019 06:48 )  PTT:30.6 sec            Cardiac Cath:  < from: Cardiac Cath Lab - Adult (02.19.19 @ 12:23) >  CORONARY VESSELS: The coronary circulation is right dominant.  LM:   --  LM: Angiography showed minor luminal irregularities with no flow  limiting lesions.  LAD:   --  LAD: Angiography showed minor luminal irregularities with no  flow limiting lesions.  CX:   --  Proximal circumflex: There was a 100 % stenosis.  RCA:   --  Ostial RCA: There was a 99 % stenosis.  --  Proximal RCA: There was a 99 % stenosis.  --  RPLS: There was a 100 % stenosis. goodd left to right collaterals      TTE / ALIA:  < from: Transthoracic Echocardiogram (04.02.19 @ 06:25) >  1. Mitral annular calcification. Tethered mitral valve  leaflets with normal opening. Moderate-severe mitral  regurgitation.  2. Calcified trileaflet aortic valve with decreased  opening. Peak transaortic valve gradient equals 14 mm Hg,  mean transaortic valve gradient equals 7 mm Hg, estimated  aortic valve area equals 0.9 sqcm (by continuity equation),  aortic valve velocity time integral equals 42 cm,  consistent with severe aortic stenosis. Minimal aortic  regurgitation.  3. Mildly dilated left atrium.  LA volume index = 40 cc/m2.  An atrial septal closure device is seen on the interatrial  septum and appears well-seated with no residual interatrial  flow by color Doppler.  4. Mild left ventricular enlargement.  5. Moderate to severe segmental left ventricular systolic  dysfunction. The inferior and inferolateral walls are  akinetic. The inferoseptal and lateral walls are  hypokinetic. There is a false tendon in the LV cavity  (normal variant).  6. Normal right ventricular size with mildly decreased  right ventricular systolic function.        Assessment:    63 yr old female with ESRD on HD, severe peripheral arterial disease s/p bilateral lower extremity bypass surgery,  admitted with recent MI.  Cardiac workup has revealed patent LAD, and diffusely diseased circumflex and right coronary arteries, moderate severe mitral regurgitation, severely diminished LV function.  She has only mild aortic stenosis.  She has minimal conduit for cabg surgery.     Plan:     As per Dr Kimball patient very high risk candidate for CABG and mitral valve surgery, recommendation is for continued medical therapy as her best option.     discussed these findings in detail with the patient.  She understands and has no desire for surgery.

## 2019-04-03 NOTE — PROGRESS NOTE ADULT - PROBLEM SELECTOR PLAN 1
-Test BG ac and hs  -C/w Levemir 4 units qhs for now  -Increase Humalog back to 3 units ac meals  -Continue Humalog low dose correction scales ac and hs  -Plan discussed with pt/team.  Contact info: [phone] (24/7). pager [phone]

## 2019-04-04 ENCOUNTER — TRANSCRIPTION ENCOUNTER (OUTPATIENT)
Age: 64
End: 2019-04-04

## 2019-04-04 VITALS
OXYGEN SATURATION: 100 % | SYSTOLIC BLOOD PRESSURE: 143 MMHG | TEMPERATURE: 98 F | RESPIRATION RATE: 18 BRPM | HEART RATE: 82 BPM | DIASTOLIC BLOOD PRESSURE: 77 MMHG

## 2019-04-04 LAB
ANION GAP SERPL CALC-SCNC: 13 MMOL/L — SIGNIFICANT CHANGE UP (ref 5–17)
ANION GAP SERPL CALC-SCNC: 16 MMOL/L — SIGNIFICANT CHANGE UP (ref 5–17)
B-OH-BUTYR SERPL-SCNC: 0.1 MMOL/L — SIGNIFICANT CHANGE UP
BUN SERPL-MCNC: 14 MG/DL — SIGNIFICANT CHANGE UP (ref 7–23)
BUN SERPL-MCNC: 16 MG/DL — SIGNIFICANT CHANGE UP (ref 7–23)
CALCIUM SERPL-MCNC: 8 MG/DL — LOW (ref 8.4–10.5)
CALCIUM SERPL-MCNC: 8.2 MG/DL — LOW (ref 8.4–10.5)
CHLORIDE SERPL-SCNC: 91 MMOL/L — LOW (ref 96–108)
CHLORIDE SERPL-SCNC: 93 MMOL/L — LOW (ref 96–108)
CO2 SERPL-SCNC: 26 MMOL/L — SIGNIFICANT CHANGE UP (ref 22–31)
CO2 SERPL-SCNC: 28 MMOL/L — SIGNIFICANT CHANGE UP (ref 22–31)
CREAT SERPL-MCNC: 3.6 MG/DL — HIGH (ref 0.5–1.3)
CREAT SERPL-MCNC: 3.65 MG/DL — HIGH (ref 0.5–1.3)
GLUCOSE BLDC GLUCOMTR-MCNC: 262 MG/DL — HIGH (ref 70–99)
GLUCOSE BLDC GLUCOMTR-MCNC: 303 MG/DL — HIGH (ref 70–99)
GLUCOSE BLDC GLUCOMTR-MCNC: 325 MG/DL — HIGH (ref 70–99)
GLUCOSE SERPL-MCNC: 409 MG/DL — HIGH (ref 70–99)
GLUCOSE SERPL-MCNC: 429 MG/DL — HIGH (ref 70–99)
HCT VFR BLD CALC: 40.1 % — SIGNIFICANT CHANGE UP (ref 34.5–45)
HGB BLD-MCNC: 12.3 G/DL — SIGNIFICANT CHANGE UP (ref 11.5–15.5)
MCHC RBC-ENTMCNC: 30.7 GM/DL — LOW (ref 32–36)
MCHC RBC-ENTMCNC: 31.5 PG — SIGNIFICANT CHANGE UP (ref 27–34)
MCV RBC AUTO: 103 FL — HIGH (ref 80–100)
PLATELET # BLD AUTO: 263 K/UL — SIGNIFICANT CHANGE UP (ref 150–400)
POTASSIUM SERPL-MCNC: 3.8 MMOL/L — SIGNIFICANT CHANGE UP (ref 3.5–5.3)
POTASSIUM SERPL-MCNC: 4 MMOL/L — SIGNIFICANT CHANGE UP (ref 3.5–5.3)
POTASSIUM SERPL-SCNC: 3.8 MMOL/L — SIGNIFICANT CHANGE UP (ref 3.5–5.3)
POTASSIUM SERPL-SCNC: 4 MMOL/L — SIGNIFICANT CHANGE UP (ref 3.5–5.3)
RBC # BLD: 3.91 M/UL — SIGNIFICANT CHANGE UP (ref 3.8–5.2)
RBC # FLD: 14.3 % — SIGNIFICANT CHANGE UP (ref 10.3–14.5)
SODIUM SERPL-SCNC: 133 MMOL/L — LOW (ref 135–145)
SODIUM SERPL-SCNC: 134 MMOL/L — LOW (ref 135–145)
WBC # BLD: 4.1 K/UL — SIGNIFICANT CHANGE UP (ref 3.8–10.5)
WBC # FLD AUTO: 4.1 K/UL — SIGNIFICANT CHANGE UP (ref 3.8–10.5)

## 2019-04-04 PROCEDURE — 93005 ELECTROCARDIOGRAM TRACING: CPT

## 2019-04-04 PROCEDURE — 99261: CPT

## 2019-04-04 PROCEDURE — 84100 ASSAY OF PHOSPHORUS: CPT

## 2019-04-04 PROCEDURE — 86706 HEP B SURFACE ANTIBODY: CPT

## 2019-04-04 PROCEDURE — 83970 ASSAY OF PARATHORMONE: CPT

## 2019-04-04 PROCEDURE — 87633 RESP VIRUS 12-25 TARGETS: CPT

## 2019-04-04 PROCEDURE — 85027 COMPLETE CBC AUTOMATED: CPT

## 2019-04-04 PROCEDURE — 82310 ASSAY OF CALCIUM: CPT

## 2019-04-04 PROCEDURE — 82330 ASSAY OF CALCIUM: CPT

## 2019-04-04 PROCEDURE — 71045 X-RAY EXAM CHEST 1 VIEW: CPT

## 2019-04-04 PROCEDURE — 83880 ASSAY OF NATRIURETIC PEPTIDE: CPT

## 2019-04-04 PROCEDURE — 82010 KETONE BODYS QUAN: CPT

## 2019-04-04 PROCEDURE — 80053 COMPREHEN METABOLIC PANEL: CPT

## 2019-04-04 PROCEDURE — 86704 HEP B CORE ANTIBODY TOTAL: CPT

## 2019-04-04 PROCEDURE — 84484 ASSAY OF TROPONIN QUANT: CPT

## 2019-04-04 PROCEDURE — 87486 CHLMYD PNEUM DNA AMP PROBE: CPT

## 2019-04-04 PROCEDURE — 84132 ASSAY OF SERUM POTASSIUM: CPT

## 2019-04-04 PROCEDURE — 36415 COLL VENOUS BLD VENIPUNCTURE: CPT

## 2019-04-04 PROCEDURE — 80048 BASIC METABOLIC PNL TOTAL CA: CPT

## 2019-04-04 PROCEDURE — 71046 X-RAY EXAM CHEST 2 VIEWS: CPT

## 2019-04-04 PROCEDURE — 85730 THROMBOPLASTIN TIME PARTIAL: CPT

## 2019-04-04 PROCEDURE — 87340 HEPATITIS B SURFACE AG IA: CPT

## 2019-04-04 PROCEDURE — 85610 PROTHROMBIN TIME: CPT

## 2019-04-04 PROCEDURE — 83735 ASSAY OF MAGNESIUM: CPT

## 2019-04-04 PROCEDURE — 84295 ASSAY OF SERUM SODIUM: CPT

## 2019-04-04 PROCEDURE — 86803 HEPATITIS C AB TEST: CPT

## 2019-04-04 PROCEDURE — 82947 ASSAY GLUCOSE BLOOD QUANT: CPT

## 2019-04-04 PROCEDURE — 99232 SBSQ HOSP IP/OBS MODERATE 35: CPT

## 2019-04-04 PROCEDURE — 85014 HEMATOCRIT: CPT

## 2019-04-04 PROCEDURE — 87581 M.PNEUMON DNA AMP PROBE: CPT

## 2019-04-04 PROCEDURE — 82803 BLOOD GASES ANY COMBINATION: CPT

## 2019-04-04 PROCEDURE — 82435 ASSAY OF BLOOD CHLORIDE: CPT

## 2019-04-04 PROCEDURE — 71250 CT THORAX DX C-: CPT

## 2019-04-04 PROCEDURE — 93306 TTE W/DOPPLER COMPLETE: CPT

## 2019-04-04 PROCEDURE — 99285 EMERGENCY DEPT VISIT HI MDM: CPT | Mod: 25

## 2019-04-04 PROCEDURE — 82962 GLUCOSE BLOOD TEST: CPT

## 2019-04-04 PROCEDURE — 83605 ASSAY OF LACTIC ACID: CPT

## 2019-04-04 PROCEDURE — 87798 DETECT AGENT NOS DNA AMP: CPT

## 2019-04-04 RX ORDER — LISINOPRIL 2.5 MG/1
1 TABLET ORAL
Qty: 0 | Refills: 0 | COMMUNITY

## 2019-04-04 RX ORDER — INSULIN LISPRO 100/ML
0 VIAL (ML) SUBCUTANEOUS
Qty: 0 | Refills: 0 | COMMUNITY

## 2019-04-04 RX ORDER — CLOPIDOGREL BISULFATE 75 MG/1
1 TABLET, FILM COATED ORAL
Qty: 0 | Refills: 0 | DISCHARGE
Start: 2019-04-04

## 2019-04-04 RX ORDER — ATORVASTATIN CALCIUM 80 MG/1
2 TABLET, FILM COATED ORAL
Qty: 0 | Refills: 0 | DISCHARGE
Start: 2019-04-04

## 2019-04-04 RX ORDER — METOPROLOL TARTRATE 50 MG
1 TABLET ORAL
Qty: 0 | Refills: 0 | DISCHARGE
Start: 2019-04-04

## 2019-04-04 RX ORDER — FUROSEMIDE 40 MG
1 TABLET ORAL
Qty: 0 | Refills: 0 | COMMUNITY

## 2019-04-04 RX ORDER — ATORVASTATIN CALCIUM 80 MG/1
1 TABLET, FILM COATED ORAL
Qty: 0 | Refills: 0 | COMMUNITY
Start: 2019-04-04

## 2019-04-04 RX ORDER — ATORVASTATIN CALCIUM 80 MG/1
1 TABLET, FILM COATED ORAL
Qty: 0 | Refills: 0 | DISCHARGE
Start: 2019-04-04

## 2019-04-04 RX ORDER — METOPROLOL TARTRATE 50 MG
1 TABLET ORAL
Qty: 0 | Refills: 0 | COMMUNITY

## 2019-04-04 RX ORDER — HYDRALAZINE HCL 50 MG
4 TABLET ORAL
Qty: 0 | Refills: 0 | COMMUNITY

## 2019-04-04 RX ORDER — CLOPIDOGREL BISULFATE 75 MG/1
1 TABLET, FILM COATED ORAL
Qty: 0 | Refills: 0 | COMMUNITY

## 2019-04-04 RX ORDER — ASPIRIN/CALCIUM CARB/MAGNESIUM 324 MG
1 TABLET ORAL
Qty: 0 | Refills: 0 | DISCHARGE
Start: 2019-04-04

## 2019-04-04 RX ORDER — CINACALCET 30 MG/1
1 TABLET, FILM COATED ORAL
Qty: 0 | Refills: 0 | COMMUNITY

## 2019-04-04 RX ORDER — INSULIN LISPRO 100/ML
4 VIAL (ML) SUBCUTANEOUS
Qty: 0 | Refills: 0 | Status: DISCONTINUED | OUTPATIENT
Start: 2019-04-04 | End: 2019-04-04

## 2019-04-04 RX ORDER — ASPIRIN/CALCIUM CARB/MAGNESIUM 324 MG
1 TABLET ORAL
Qty: 0 | Refills: 0 | COMMUNITY

## 2019-04-04 RX ORDER — INSULIN LISPRO 100/ML
2 VIAL (ML) SUBCUTANEOUS AT BEDTIME
Qty: 0 | Refills: 0 | Status: DISCONTINUED | OUTPATIENT
Start: 2019-04-04 | End: 2019-04-04

## 2019-04-04 RX ADMIN — Medication 3 UNIT(S): at 09:39

## 2019-04-04 RX ADMIN — CLOPIDOGREL BISULFATE 75 MILLIGRAM(S): 75 TABLET, FILM COATED ORAL at 10:45

## 2019-04-04 RX ADMIN — Medication 3 UNIT(S): at 13:41

## 2019-04-04 RX ADMIN — Medication 4: at 13:41

## 2019-04-04 RX ADMIN — Medication 81 MILLIGRAM(S): at 10:45

## 2019-04-04 RX ADMIN — Medication 4: at 09:39

## 2019-04-04 RX ADMIN — OXYCODONE AND ACETAMINOPHEN 1 TABLET(S): 5; 325 TABLET ORAL at 05:11

## 2019-04-04 RX ADMIN — DULOXETINE HYDROCHLORIDE 60 MILLIGRAM(S): 30 CAPSULE, DELAYED RELEASE ORAL at 10:45

## 2019-04-04 RX ADMIN — Medication 50 MILLIGRAM(S): at 05:07

## 2019-04-04 RX ADMIN — OXYCODONE AND ACETAMINOPHEN 1 TABLET(S): 5; 325 TABLET ORAL at 06:00

## 2019-04-04 NOTE — DISCHARGE NOTE NURSING/CASE MANAGEMENT/SOCIAL WORK - NSDCPEPT PROEDHF_GEN_ALL_CORE
Low salt diet/Report signs and symptoms to primary care provider/Call primary care provider for follow up after discharge/Activities as tolerated/Monitor weight daily

## 2019-04-04 NOTE — PROGRESS NOTE ADULT - PROVIDER SPECIALTY LIST ADULT
CT Surgery
Cardiology
Endocrinology
Internal Medicine
Nephrology
Pulmonology
Internal Medicine
Nephrology
Nephrology
Internal Medicine
Internal Medicine

## 2019-04-04 NOTE — DISCHARGE NOTE PROVIDER - HOSPITAL COURSE
63y Female c hx CAD (Cath Feb '19 showing 99% RCA, 100% RPLS, 100% Cx) s/p >8 stents/brachytherapy, ESRD (on HD M/T/T/Sa), DM1, CHF (EF ?30% per last [facility]), mod MR/mild AS, TIA, severe PVD s/p b/l fempop bypass, subclavian vein stenosis s/p stent, COPD not on O2, gout, recent admissions for DKA requiring insulin gtt/PNA, pw NSTEMI, hyperkalemia, and hypotension of unclear etiology, Referred for CABG/MVR after cath revealed CAD and mitral regurg . CTS SX consulted Pt was deemed to be high risk for SX interventions  and recommends medical management . 63y Female c hx CAD (Cath Feb '19 showing 99% RCA, 100% RPLS, 100% Cx) s/p >8 stents/brachytherapy, ESRD (on HD M/T/T/Sa), DM1, CHF (EF ?30% per last [facility]), mod MR/mild AS, TIA, severe PVD s/p b/l fempop bypass, subclavian vein stenosis s/p stent, COPD not on O2, gout, recent admissions for DKA requiring insulin gtt/PNA, pw NSTEMI, hyperkalemia, and hypotension of unclear etiology,  cath revealed CAD and mitral regurg . CTS SX consulted Pt was deemed to be high risk for SX interventions  and recommends medical management . Remained HD stable and  asymptomatic will be discharged home with follow up form DR Biswas and DR Ley

## 2019-04-04 NOTE — PROGRESS NOTE ADULT - PROBLEM SELECTOR PLAN 1
-Test BG ac and hs  -C/w Levemir 4 units qhs for now  -Increase Humalog  to 4 units ac meals. Slow titration since pt is insulin sensitive and if she decreases PO intake will have rebound hypoglycemia.   -Add Humalog 2 units at hs if pt eats food at bedtime.   -Continue Humalog low dose correction scales ac and hs  -Upon discharge pt to restart Lantus 4 units q hs and Humalog 3 to 5 units ac meals depending on PO intake.  -Pt to f/u with private endo Dr Ley in [place] . Saw endo last month.  -Plan discussed with pt/team.  Contact info: [phone] (24/7). pager [phone]

## 2019-04-04 NOTE — DISCHARGE NOTE PROVIDER - NSDCCPCAREPLAN_GEN_ALL_CORE_FT
PRINCIPAL DISCHARGE DIAGNOSIS  Diagnosis: NSTEMI (non-ST elevated myocardial infarction)  Assessment and Plan of Treatment: Coronary artery disease is a condition where the arteries the supply the heart muscle get clogges with fatty deposits & puts you at risk for a heart attack  Call your doctor if you have any new pain, pressure, or discomfort in the center of your chest, pain, tingling or discomfort in arms, back, neck, jaw, or stomach, shortness of breath, nausea, vomiting, burping or heartburn, sweating, cold and clammy skin, racing or abnormal heartbeat for more than 10 minutes or if they keep coming & going.  Call 911 and do not tr to get to hospital by care  You can help yourself with lefestyle changes (quitting smoking if you smoke), eat lots of fruits & vegetables & low fat dairy products, not a lot of meat & fatty foods, walk or some form of physical activity most days of the week, lose weight if you are overweight  Take your cardiac medication as prescribed to lower cholesterol, to lower blood pressure, aspirin to prevent blood clots, and diabetes control  Make sure to keep appointments with doctor for cardiac follow up care  )      SECONDARY DISCHARGE DIAGNOSES  Diagnosis: ESRD on dialysis  Assessment and Plan of Treatment: Avoid taking (NSAIDs) - (ex: Ibuprofen, Advil, Celebrex, Naprosyn)  Avoid taking any nephrotoxic agents (can harm kidneys) - Intravenous contrast for diagnostic testing, combination cold medications.  Have all medications adjusted for your renal function by your Health Care Provider.  Blood pressure control is important.  Take all medication as prescribed.      Diagnosis: Chronic systolic congestive heart failure  Assessment and Plan of Treatment: Weigh yourself daily.  If you gain 3lbs in 3 days, or 5lbs in a week call your Health Care Provider.  Do not eat or drink foods containing more than 2000mg of salt (sodium) in your diet every day.  Call your Health Care Provider if you have any swelling or increased swelling in your feet, ankles, and/or stomach.  Take all of your medication as directed.  If you become dizzy call your Health Care Provider.      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  Make sure you get your HgA1c checked every three months.  If you take oral diabetes medications, check your blood glucose two times a day.  If you take insulin, check your blood glucose before meals and at bedtime.  It's important not to skip any meals.  Keep a log of your blood glucose results and always take it with you to your doctor appointments.  Keep a list of your current medications including injectables and over the counter medications and bring this medication list with you to all your doctor appointments.  If you have not seen your opthalmologist this year call for appointment.  Check your feet daily for redness, sores, or openings. Do not self treat. If no improvement in two days call your primary care physician for an appointment.  Low blood sugar (hypoglycemia) is a blood sugar below 70mg/dl. Check your blood sugar if you feel signs/symptoms of hypoglycemia. If your blood sugar is below 70 take 15 grams of carbohydrates (ex 4 oz of apple juice, 3-4 glucosr tablets, or 4-6 oz of regular soda) wait 15 minutes and repeat blood sugar to make sure it comes up above 70.  If your blood sugar is above 70 and you are due for a meal, have a meal.  If you are not due for a meal have a snack.  This snack helps keeps your blood sugar at a safe range.      Diagnosis: SOB (shortness of breath)  Assessment and Plan of Treatment:

## 2019-04-04 NOTE — CHART NOTE - NSCHARTNOTEFT_GEN_A_CORE
[facility] Event Note    Notified by RN for glucose 434 at 22:11. Pt asymptomatic, NAD. No complaints.  Denies CP, SOB, dizziness, palpitations, HA, N/V/D. Pt states she "ate a lot" during the day.    Spoke to endo on call about value: Okay to do BMP with Beta hydroxy butyrate and to re-check glucose in AM.  Pt has history of fluctuating glucose levels.    Vital Signs Last 24 Hrs  T(C): 36.7 (04 Apr 2019 04:56), Max: 36.7 (04 Apr 2019 04:56)  T(F): 98 (04 Apr 2019 04:56), Max: 98 (04 Apr 2019 04:56)  HR: 76 (04 Apr 2019 04:56) (71 - 85)  BP: 131/72 (04 Apr 2019 04:56) (130/81 - 159/87)  BP(mean): --  RR: 18 (04 Apr 2019 04:56) (18 - 18)  SpO2: 96% (04 Apr 2019 04:56) (96% - 99%)    PHYSICAL EXAM:  GENERAL: Laying down, in no acute distress  PSYCH: AAOx3  HEAD:  Atraumatic, Normocephalic  EYES: EOMI, PERRLA, conjunctiva and sclera clear  NECK: Supple, No JVD  CHEST/LUNG: Breath sounds clear to auscultation bilaterally.  No wheezes, rales, rhonchi or crackles  HEART: +S1/S2.  Regular rate and rhythm.  No murmurs, rubs or gallops  ABDOMEN: Bowel sounds present in all 4 quadrants.  Soft, Nontender, Nondistended  EXTREMITIES: No edema or erythema noted in bilateral lower extremities  NEUROLOGY: Cranial nerves 2-12 grossly intact  SKIN: No rashes or lesions      -Ordered BMP with BHB  - Beta hyd butyrate 0.1, Cr 3.9 ( prev 4.94), Na 134  - will f/u 6 AM labs, and AM glucose level  - cont to monitor and will endorse to AM team    April Durbin PA-C  Dept of Medicine  #[id number] [facility] Event Note    Notified by RN for glucose 434 at 22:11. Pt asymptomatic, NAD. No complaints.  Denies CP, SOB, dizziness, palpitations, HA, N/V/D. Pt states she "ate a lot" during the day.    Spoke to endo on call about value: Okay to do BMP with Beta hydroxy butyrate and to re-check glucose in AM and to continue with current insulin dosing.  Pt has history of fluctuating glucose levels.    Vital Signs Last 24 Hrs  T(C): 36.7 (04 Apr 2019 04:56), Max: 36.7 (04 Apr 2019 04:56)  T(F): 98 (04 Apr 2019 04:56), Max: 98 (04 Apr 2019 04:56)  HR: 76 (04 Apr 2019 04:56) (71 - 85)  BP: 131/72 (04 Apr 2019 04:56) (130/81 - 159/87)  BP(mean): --  RR: 18 (04 Apr 2019 04:56) (18 - 18)  SpO2: 96% (04 Apr 2019 04:56) (96% - 99%)    PHYSICAL EXAM:  GENERAL: Laying down, in no acute distress  PSYCH: AAOx3  HEAD:  Atraumatic, Normocephalic  EYES: EOMI, PERRLA, conjunctiva and sclera clear  NECK: Supple, No JVD  CHEST/LUNG: Breath sounds clear to auscultation bilaterally.  No wheezes, rales, rhonchi or crackles  HEART: +S1/S2.  Regular rate and rhythm.  No murmurs, rubs or gallops  ABDOMEN: Bowel sounds present in all 4 quadrants.  Soft, Nontender, Nondistended  EXTREMITIES: No edema or erythema noted in bilateral lower extremities  NEUROLOGY: Cranial nerves 2-12 grossly intact  SKIN: No rashes or lesions      -Ordered BMP with BHB  - Beta hyd butyrate 0.1, Cr 3.9 ( prev 4.94), Na 134  - will f/u 6 AM labs, and AM glucose level  - cont to monitor and will endorse to AM team    April Durbin PA-C  Dept of Medicine  #[id number]

## 2019-04-04 NOTE — PROGRESS NOTE ADULT - ASSESSMENT
· Assessment		  63F c hx CAD (Cath Feb '19 showing 99% RCA, 100% RPLS, 100% Cx) s/p >8 stents/brachytherapy, ESRD (on HD M/T/T/Sa), DM1, CHF (EF ?30% per last [facility]), mod MR/mild AS, TIA, severe PVD s/p b/l fempop bypass, subclavian vein stenosis s/p stent, COPD not on O2, gout, recent admissions for DKA requiring insulin gtt/PNA, pw NSTEMI, hyperkalemia, and hypotension of unclear etiology,     NSTEMI (non-ST elevated myocardial infarction).   - cont asa, plavix.  - metoprolol  - TTE  - tele  - cardiology follow    Hyperkalemia.    - HD  - tele.   - nephrology follow    hypotension.   - improved  - monitor for infection.   - holding BP meds except metoprolol.     Opacity of lung on imaging study.   - EBUS/biopsies earlier this month showed reactive lymphadenopathy, no malignancy    ESRD (end stage renal disease).   - HD  - low k diet.  - Nephrology follow Dr. Schmidt     Chronic systolic congestive heart failure.  - pt reportedly makes only a little urine  - holding lasix 2/2 hypotension that was fluid responsive.    Type 1 diabetes mellitus with diabetic peripheral angiopathy without gangrene.   - cont BS control  - endo follow.   Pierce Viera MD pager [phone]
63F c hx CAD (Cath Feb '19 showing 99% RCA, 100% RPLS, 100% Cx) s/p >8 stents/brachytherapy, ESRD (on HD M/T/T/Sa), DM1, CHF (EF ?30% per last [facility]), mod MR/mild AS, TIA, severe PVD s/p b/l fempop bypass, subclavian vein stenosis s/p stent, COPD not on O2, gout, recent admissions for DKA requiring insulin gtt/PNA, presents with sudden onset chest pain and SOB. now with hyperkalemia and chf    1- esrd  2- chf   3- shpt   4- htn   5- dm    hd am   chf is more compensated   dc sensipar and renvela given pth is very low   cont insulin   cont toprol   ? restart ace-i
63F c hx CAD (Cath Feb '19 showing 99% RCA, 100% RPLS, 100% Cx) s/p >8 stents/brachytherapy, ESRD (on HD M/T/T/Sa), DM1, CHF (EF ?30% per last [facility]), mod MR/mild AS, TIA, severe PVD s/p b/l fempop bypass, subclavian vein stenosis s/p stent, COPD not on O2, gout, recent admissions for DKA requiring insulin gtt/PNA, presents with sudden onset chest pain and SOB. now with hyperkalemia and chf    1- esrd  2- chf   3- shpt   4- htn   5- dm     HD am   chf becoming more compensated  cont sensipar 60 mg daily   cont with renvela with meals   PTH pending   resume lisinopril 10 mg daily and raise dose as needed  cont with insulin
· Assessment		  63F c hx CAD (Cath Feb '19 showing 99% RCA, 100% RPLS, 100% Cx) s/p >8 stents/brachytherapy, ESRD (on HD M/T/T/Sa), DM1, CHF (EF ?30% per last [facility]), mod MR/mild AS, TIA, severe PVD s/p b/l fempop bypass, subclavian vein stenosis s/p stent, COPD not on O2, gout, recent admissions for DKA requiring insulin gtt/PNA, pw NSTEMI, hyperkalemia, and hypotension of unclear etiology,     NSTEMI (non-ST elevated myocardial infarction).   - cont asa, plavix.  - metoprolol  - tele  - cardiology follow    Acute on Chronic Systolic Congestive Heart Failure   - continue HD  - cardiology follow Cleared for DC.    Severe AS  - cardiology follow.  - CT surgery evaluation noted. Not a good candidate for valve/surgery.    Hyperkalemia.    - HD  - tele.   - nephrology follow    hypotension.   - improved  - monitor for infection.   - holding BP meds except metoprolol.     Opacity of lung on imaging study.   - EBUS/biopsies earlier this month showed reactive lymphadenopathy, no malignancy    ESRD (end stage renal disease).   - HD  - low k diet.  - Nephrology follow Dr. Schmidt     Type 1 diabetes mellitus with diabetic peripheral angiopathy without gangrene.   - cont BS control  - endo follow.     DC home. Follow with PMD/ Cardiology/ Nephrology/ Endocrine. QA  Pierce Viera MD pager [phone]
· Assessment		  63F c hx CAD (Cath Feb '19 showing 99% RCA, 100% RPLS, 100% Cx) s/p >8 stents/brachytherapy, ESRD (on HD M/T/T/Sa), DM1, CHF (EF ?30% per last [facility]), mod MR/mild AS, TIA, severe PVD s/p b/l fempop bypass, subclavian vein stenosis s/p stent, COPD not on O2, gout, recent admissions for DKA requiring insulin gtt/PNA, pw NSTEMI, hyperkalemia, and hypotension of unclear etiology,     NSTEMI (non-ST elevated myocardial infarction).   - cont asa, plavix.  - metoprolol  - tele  - cardiology follow  - possible cath.    Acute on Chronic Systolic Congestive Heart Failure   - continue HD  - cardiology follow    Severe AS  - cardiology follow.    Hyperkalemia.    - HD  - tele.   - nephrology follow    hypotension.   - improved  - monitor for infection.   - holding BP meds except metoprolol.     Opacity of lung on imaging study.   - EBUS/biopsies earlier this month showed reactive lymphadenopathy, no malignancy    ESRD (end stage renal disease).   - HD  - low k diet.  - Nephrology follow Dr. Schmidt     Type 1 diabetes mellitus with diabetic peripheral angiopathy without gangrene.   - cont BS control  - endo follow.   Pierce Viera MD pager [phone]
62 yo woman with uncontrolled type 1 diabetes (well known from prior admissions) w/ESRD on HD, neuropathy, CAD, CVA, CHF and multiple cardiac stents. Presented with CP/SOB found to have NSTEMI, hyperkalemia, and hypotension per primary team.  Not a candidate for heart surgery so will be treated medically. Tolerating POs with nutritional indiscretions with severe hyperglycemia.  No hypoglycemia. Noted BMP negative for DKA. Pt glucotoxic due to increased PO intake. Spoke to pt about diet adherence. She states she agrees she should but if she feels hungry she will eat. Instructed pt to report when she eats so she can get premeal insulin. Will add bedtime snack Humalog.
62 yo woman with uncontrolled type 1 diabetes (well known from prior admissions) w/ESRD on HD, neuropathy, CAD, CVA, CHF and multiple cardiac stents. Presented with CP/SOB found to have NSTEMI, hyperkalemia, and hypotension per primary team. Tolerating POs with glycemic control tight while on present insulin regimen. Noted BG >200s ac lunch likely due to having late breakfast. No further hypoglycemia. Per primary team possible cardiac cath but not scheduled yet.
62 yo woman with uncontrolled type 1 diabetes (well known from prior admissions) w/ESRD on HD, neuropathy, CAD, CVA, CHF and multiple cardiac stents. Presented with CP/SOB found to have NSTEMI, hyperkalemia, and hypotension per primary team. Tolerating POs with glycemic control tight while on present insulin regimen. Positive prandial hypoglycemia yesterday while on Humalog 3 units ac meals. Will decrease meal time humalog dose.
63F c hx CAD (Cath Feb '19 showing 99% RCA, 100% RPLS, 100% Cx) s/p >8 stents/brachytherapy, ESRD (on HD M/T/T/Sa), DM1, CHF (EF ?30% per last [facility]), mod MR/mild AS, TIA, severe PVD s/p b/l fempop bypass, subclavian vein stenosis s/p stent, COPD not on O2, gout, recent admissions for DKA requiring insulin gtt/PNA, presents with sudden onset chest pain and SOB. now with hyperkalemia and chf    1- esrd  2- chf   3- shpt   4- htn   5- dm     hd am  chf compensated    holding sensipar   d/w Dr. Viera
63F c hx CAD (Cath Feb '19 showing 99% RCA, 100% RPLS, 100% Cx) s/p >8 stents/brachytherapy, ESRD (on HD M/T/T/Sa), DM1, CHF (EF ?30% per last [facility]), mod MR/mild AS, TIA, severe PVD s/p b/l fempop bypass, subclavian vein stenosis s/p stent, COPD not on O2, gout, recent admissions for DKA requiring insulin gtt/PNA, presents with sudden onset chest pain and SOB. now with hyperkalemia and chf    1- esrd  2- chf   3- shpt   4- htn   5- dm     hd revaclear 300 3.5 hr 2 liter 2 k bath   see hd flow sheet
63F c hx CAD (Cath Feb '19 showing 99% RCA, 100% RPLS, 100% Cx) s/p >8 stents/brachytherapy, ESRD (on HD M/T/T/Sa), DM1, CHF (EF ?30% per last [facility]), mod MR/mild AS, TIA, severe PVD s/p b/l fempop bypass, subclavian vein stenosis s/p stent, COPD not on O2, gout, recent admissions for DKA requiring insulin gtt/PNA, presents with sudden onset chest pain and SOB. now with hyperkalemia and chf    1- esrd  2- chf   3- shpt   4- htn   5- dm     hd revaclear 300 3.5 hr 2 liter 2 k bath   hb higher.   holding sensipar
63F c hx CAD (Cath Feb '19 showing 99% RCA, 100% RPLS, 100% Cx) s/p >8 stents/brachytherapy, ESRD (on HD M/T/T/Sa), DM1, CHF (EF ?30% per last [facility]), mod MR/mild AS, TIA, severe PVD s/p b/l fempop bypass, subclavian vein stenosis s/p stent, COPD not on O2, gout, recent admissions for DKA requiring insulin gtt/PNA, presents with sudden onset chest pain and SOB. now with hyperkalemia and chf    1- esrd  2- chf     hd 3 h revaclear 300 2 k bath 2 liter fluid removal  next hd in am
63F c hx CAD (Cath Feb '19 showing 99% RCA, 100% RPLS, 100% Cx) s/p >8 stents/brachytherapy, ESRD (on HD M/T/T/Sa), DM1, CHF (EF ?30% per last [facility]), mod MR/mild AS, TIA, severe PVD s/p b/l fempop bypass, subclavian vein stenosis s/p stent, COPD not on O2, gout, recent admissions for DKA requiring insulin gtt/PNA, presents with sudden onset chest pain and SOB. now with hyperkalemia and chf    1- esrd  2- chf   hd revaclear 300 3.5 hr 2.5 liter 2 k bath bfr 400   see hd flow sheet  cont hd
63F c hx CAD (Cath Feb '19 showing 99% RCA, 100% RPLS, 100% Cx) s/p >8 stents/brachytherapy, ESRD (on HD M/T/T/Sa), DM1, CHF (EF ?30% per last [facility]), mod MR/mild AS, TIA, severe PVD s/p b/l fempop bypass, subclavian vein stenosis s/p stent, COPD not on O2, gout, recent admissions for DKA requiring insulin gtt/PNA, presents with sudden onset chest pain and SOB. now with hyperkalemia and chf    1- esrd  2- chf     hd 3 h revaclear 300 2 k bath 2 liter fluid removal
63F c hx CAD (Cath Feb '19 showing 99% RCA, 100% RPLS, 100% Cx) s/p >8 stents/brachytherapy, ESRD (on HD M/T/T/Sa), DM1, CHF (EF ?30% per last [facility]), mod MR/mild AS, TIA, severe PVD s/p b/l fempop bypass, subclavian vein stenosis s/p stent, COPD not on O2, gout, recent admissions for DKA requiring insulin gtt/PNA, presents with sudden onset chest pain and SOB. now with hyperkalemia and chf    1- esrd  2- chf   hd am for increase fluid removal   shpt cont renvela with meals cont sensipar 60 mg daily  check pth   hold ace-i for now to allow increase fluid removal with hd
64 yo woman with uncontrolled type 1 diabetes (well known from prior admissions) w/ESRD on HD, neuropathy, CAD, CVA, CHF and multiple cardiac stents. Presented with CP/SOB found to have NSTEMI, hyperkalemia, and hypotension per primary team. Feels better. Tolerating POs with glycemic control tight while on present insulin regimen. No hypoglycemia.
64 yo woman with uncontrolled type 1 diabetes (well known from prior admissions) w/ESRD on HD, neuropathy, CAD, CVA, CHF and multiple cardiac stents. Presented with CP/SOB found to have NSTEMI, hyperkalemia, and hypotension per primary team. Feels better. Tolerating POs with glycemic control tight while on present insulin regimen. No hypoglycemia. Will preventively decrease Lantus dose.
64 yo woman with uncontrolled type 1 diabetes (well known from prior admissions) w/ESRD on HD, neuropathy, CAD, CVA, CHF and multiple cardiac stents. Presented with CP/SOB found to have NSTEMI, hyperkalemia, and hypotension per primary team. Tolerating POs with glycemic control above goal due to improved PO intake.  No further hypoglycemia.
· Assessment		  63F c hx CAD (Cath Feb '19 showing 99% RCA, 100% RPLS, 100% Cx) s/p >8 stents/brachytherapy, ESRD (on HD M/T/T/Sa), DM1, CHF (EF ?30% per last [facility]), mod MR/mild AS, TIA, severe PVD s/p b/l fempop bypass, subclavian vein stenosis s/p stent, COPD not on O2, gout, recent admissions for DKA requiring insulin gtt/PNA, pw NSTEMI, hyperkalemia, and hypotension of unclear etiology,     NSTEMI (non-ST elevated myocardial infarction).   - cont asa, plavix.  - metoprolol  - tele  - cardiology follow to decide re possible cath.    Acute on Chronic Systolic Congestive Heart Failure   - continue HD  - cardiology follow    Severe AS  - cardiology follow.    Hyperkalemia.    - HD  - tele.   - nephrology follow    hypotension.   - improved  - monitor for infection.   - holding BP meds except metoprolol.     Opacity of lung on imaging study.   - EBUS/biopsies earlier this month showed reactive lymphadenopathy, no malignancy    ESRD (end stage renal disease).   - HD  - low k diet.  - Nephrology follow Dr. Schmidt     Type 1 diabetes mellitus with diabetic peripheral angiopathy without gangrene.   - cont BS control  - endo follow.   Pierce Viera MD pager [phone]
· Assessment		  63F c hx CAD (Cath Feb '19 showing 99% RCA, 100% RPLS, 100% Cx) s/p >8 stents/brachytherapy, ESRD (on HD M/T/T/Sa), DM1, CHF (EF ?30% per last [facility]), mod MR/mild AS, TIA, severe PVD s/p b/l fempop bypass, subclavian vein stenosis s/p stent, COPD not on O2, gout, recent admissions for DKA requiring insulin gtt/PNA, pw NSTEMI, hyperkalemia, and hypotension of unclear etiology,     NSTEMI (non-ST elevated myocardial infarction).   - cont asa, plavix.  - metoprolol  - TTE  - tele  - cardiology follow  - possible cath    Hyperkalemia.    - HD  - tele.   - nephrology follow    hypotension.   - improved  - monitor for infection.   - holding BP meds except metoprolol.     Opacity of lung on imaging study.   - EBUS/biopsies earlier this month showed reactive lymphadenopathy, no malignancy    ESRD (end stage renal disease).   - HD  - low k diet.  - Nephrology follow Dr. Schmidt     Chronic systolic congestive heart failure.  - pt reportedly makes only a little urine  - holding lasix 2/2 hypotension that was fluid responsive.    Type 1 diabetes mellitus with diabetic peripheral angiopathy without gangrene.   - cont BS control  - endo follow.   Pierce Viera MD pager [phone]
· Assessment		  63F c hx CAD (Cath Feb '19 showing 99% RCA, 100% RPLS, 100% Cx) s/p >8 stents/brachytherapy, ESRD (on HD M/T/T/Sa), DM1, CHF (EF ?30% per last [facility]), mod MR/mild AS, TIA, severe PVD s/p b/l fempop bypass, subclavian vein stenosis s/p stent, COPD not on O2, gout, recent admissions for DKA requiring insulin gtt/PNA, pw NSTEMI, hyperkalemia, and hypotension of unclear etiology,     NSTEMI (non-ST elevated myocardial infarction).   - cont asa, plavix.  - metoprolol  - tele  - cardiology follow    Acute on Chronic Systolic Congestive Heart Failure   - continue HD  - cardiology follow    Severe AS  - cardiology follow.  - CT surgery evaluation noted. Not a good candidate for valve/surgery.    Hyperkalemia.    - HD  - tele.   - nephrology follow    hypotension.   - improved  - monitor for infection.   - holding BP meds except metoprolol.     Opacity of lung on imaging study.   - EBUS/biopsies earlier this month showed reactive lymphadenopathy, no malignancy    ESRD (end stage renal disease).   - HD  - low k diet.  - Nephrology follow Dr. Schmidt     Type 1 diabetes mellitus with diabetic peripheral angiopathy without gangrene.   - cont BS control  - endo follow.     DCP home.  Pierce Viera MD pager [phone]
· Assessment		  63F c hx CAD (Cath Feb '19 showing 99% RCA, 100% RPLS, 100% Cx) s/p >8 stents/brachytherapy, ESRD (on HD M/T/T/Sa), DM1, CHF (EF ?30% per last [facility]), mod MR/mild AS, TIA, severe PVD s/p b/l fempop bypass, subclavian vein stenosis s/p stent, COPD not on O2, gout, recent admissions for DKA requiring insulin gtt/PNA, pw NSTEMI, hyperkalemia, and hypotension of unclear etiology,     NSTEMI (non-ST elevated myocardial infarction).   - cont asa, plavix.  - metoprolol  - TTE  - tele  - cardiology follow  - possible cath.    Hyperkalemia.    - HD  - tele.   - nephrology follow    hypotension.   - improved  - monitor for infection.   - holding BP meds except metoprolol.     Opacity of lung on imaging study.   - EBUS/biopsies earlier this month showed reactive lymphadenopathy, no malignancy    ESRD (end stage renal disease).   - HD  - low k diet.  - Nephrology follow Dr. Schmidt     Chronic systolic congestive heart failure.  - cardiology follow    Type 1 diabetes mellitus with diabetic peripheral angiopathy without gangrene.   - cont BS control  - endo follow.   Pierce Viera MD pager [phone]
· Assessment		  63F c hx CAD (Cath Feb '19 showing 99% RCA, 100% RPLS, 100% Cx) s/p >8 stents/brachytherapy, ESRD (on HD M/T/T/Sa), DM1, CHF (EF ?30% per last [facility]), mod MR/mild AS, TIA, severe PVD s/p b/l fempop bypass, subclavian vein stenosis s/p stent, COPD not on O2, gout, recent admissions for DKA requiring insulin gtt/PNA, pw NSTEMI, hyperkalemia, and hypotension of unclear etiology,     NSTEMI (non-ST elevated myocardial infarction).   - cont asa, plavix.  - metoprolol  - TTE  - tele  - cardiology follow re cath    Hyperkalemia.    - HD  - tele.   - nephrology follow    hypotension.   - improved  - monitor for infection.   - holding BP meds except metoprolol.     Opacity of lung on imaging study.   - EBUS/biopsies earlier this month showed reactive lymphadenopathy, no malignancy    ESRD (end stage renal disease).   - HD  - low k diet.  - Nephrology follow Dr. Schmidt     Chronic systolic congestive heart failure.  - pt reportedly makes only a little urine  - holding lasix 2/2 hypotension that was fluid responsive.    Type 1 diabetes mellitus with diabetic peripheral angiopathy without gangrene.   - cont BS control  - endo consult noted.   Pierce Viera MD pager [phone]
· Assessment		  63F c hx CAD (Cath Feb '19 showing 99% RCA, 100% RPLS, 100% Cx) s/p >8 stents/brachytherapy, ESRD (on HD M/T/T/Sa), DM1, CHF (EF ?30% per last [facility]), mod MR/mild AS, TIA, severe PVD s/p b/l fempop bypass, subclavian vein stenosis s/p stent, COPD not on O2, gout, recent admissions for DKA requiring insulin gtt/PNA, pw NSTEMI, hyperkalemia, and hypotension of unclear etiology,     NSTEMI (non-ST elevated myocardial infarction).   - cont asa, plavix.  - metoprolol  - TTE  - tele  - cardiology follow    Hyperkalemia.    - HD  - tele.   - nephrology follow    hypotension.   - improved  - monitor for infection.   - holding BP meds except metoprolol.     Opacity of lung on imaging study.   - EBUS/biopsies earlier this month showed reactive lymphadenopathy, no malignancy    ESRD (end stage renal disease).   - HD  - low k diet.  - Nephrology follow Dr. Schmidt     Chronic systolic congestive heart failure.  - pt reportedly makes only a little urine  - holding lasix 2/2 hypotension that was fluid responsive.    Type 1 diabetes mellitus with diabetic peripheral angiopathy without gangrene.   - cont BS control  - endo follow.   Pierce Viera MD pager [phone]
· Assessment      63F c hx CAD (Cath Feb '19 showing 99% RCA, 100% RPLS, 100% Cx) s/p >8 stents/brachytherapy, ESRD (on HD M/T/T/Sa), DM1, CHF (EF ?30% per last [facility]), mod MR/mild AS, TIA, severe PVD s/p b/l fempop bypass, subclavian vein stenosis s/p stent, COPD not on O2, gout, recent admissions for DKA requiring insulin gtt/PNA, pw NSTEMI, hyperkalemia, and hypotension of unclear etiology,     NSTEMI (non-ST elevated myocardial infarction).   - explains pt's CP and SOB  - elevated trops with EKG changes  - cont asa, plavix.  - cont pt's metoprolol only  - TTE  - tele  - cardiology follow re cath    Hyperkalemia.    - HD  - tele.   - nephrology follow    hypotension.   - improved after 1.5L IVF  - monitor for infection.   - holding BP meds except metoprolol.     Opacity of lung on imaging study.   - EBUS/biopsies earlier this month showed reactive lymphadenopathy, no malignancy    ESRD (end stage renal disease).   - HD  - low k diet.  - Nephrology follow Dr. Schmidt     Chronic systolic congestive heart failure.  - pt reportedly makes only a little urine  - holding lasix 2/2 hypotension that was fluid responsive.  Type 1 diabetes mellitus with diabetic peripheral angiopathy without gangrene.   - cont lantus 5U QHS + ISS  - endo consult.   Pierce Viera MD pager [phone]

## 2019-04-04 NOTE — PROVIDER CONTACT NOTE (OTHER) - ASSESSMENT
Pt A+Ox4. No ss of discomfort. Pt admits to "eating a lot today." A1C7.9.  4units Humalog given as ordered per sliding scale. Levemir 4units as ordered

## 2019-04-04 NOTE — PROGRESS NOTE ADULT - SUBJECTIVE AND OBJECTIVE BOX
[facility]   [phone]  RENAL FOLLOW UP NOTE  --------------------------------------------------------------------------------  Chief Complaint:    24 hour events/subjective:    seen. states breathing is better and her leg swelling has improved     PAST HISTORY  --------------------------------------------------------------------------------  No significant changes to PMH, PSH, FHx, SHx, unless otherwise noted    ALLERGIES & MEDICATIONS  --------------------------------------------------------------------------------  Allergies    No Known Allergies    Intolerances      Standing Inpatient Medications  aspirin enteric coated 81 milliGRAM(s) Oral daily  atorvastatin 20 milliGRAM(s) Oral at bedtime  cinacalcet 60 milliGRAM(s) Oral daily  clopidogrel Tablet 75 milliGRAM(s) Oral daily  dextrose 5%. 1000 milliLiter(s) IV Continuous <Continuous>  dextrose 50% Injectable 12.5 Gram(s) IV Push once  dextrose 50% Injectable 25 Gram(s) IV Push once  DULoxetine 60 milliGRAM(s) Oral daily  heparin  Injectable 5000 Unit(s) SubCutaneous every 8 hours  insulin glargine Injectable (LANTUS) 5 Unit(s) SubCutaneous at bedtime  insulin lispro (HumaLOG) corrective regimen sliding scale   SubCutaneous three times a day before meals  insulin lispro (HumaLOG) corrective regimen sliding scale   SubCutaneous at bedtime  insulin lispro Injectable (HumaLOG) 3 Unit(s) SubCutaneous three times a day with meals  metoprolol succinate ER 50 milliGRAM(s) Oral daily  sevelamer carbonate 800 milliGRAM(s) Oral three times a day with meals    PRN Inpatient Medications  dextrose 40% Gel 15 Gram(s) Oral once PRN  glucagon  Injectable 1 milliGRAM(s) IntraMuscular once PRN  oxyCODONE    5 mG/acetaminophen 325 mG 1 Tablet(s) Oral every 4 hours PRN      REVIEW OF SYSTEMS  --------------------------------------------------------------------------------    Gen: denies fevers/chills,  CVS: denies chest pain/palpitations  Resp: denies SOB/Cough  GI: Denies N/V/Abd pain  : Denies dysuria    All other systems were reviewed and are negative, except as noted.    VITALS/PHYSICAL EXAM  --------------------------------------------------------------------------------  T(C): 36.6 (03-31-19 @ 12:50), Max: 36.8 (03-30-19 @ 21:04)  HR: 81 (03-31-19 @ 12:50) (74 - 82)  BP: 149/73 (03-31-19 @ 12:50) (139/76 - 169/81)  RR: 18 (03-31-19 @ 12:50) (17 - 19)  SpO2: 99% (03-31-19 @ 12:50) (96% - 100%)  Wt(kg): --        03-30-19 @ 07:01  -  03-31-19 @ 07:00  --------------------------------------------------------  IN: 480 mL / OUT: 2000 mL / NET: -1520 mL    03-31-19 @ 07:01  -  03-31-19 @ 13:33  --------------------------------------------------------  IN: 180 mL / OUT: 0 mL / NET: 180 mL      Physical Exam:  	  		  Gen: chronically ill appearing F   	+  jvd ,   	Pulm: decrease bs no rales or ornchi   	CV: RRR, S1S2; no rub  	Abd: +BS, soft, nontender/nondistended  	: No suprapubic tenderness  	UE: Warm, no cyanosis  no clubbing,  no edema; no asterixis  	LE: Warm, no cyanosis  no clubbing, no edema   	Neuro: alert and oriented. speech coherent   				  	      LABS/STUDIES  --------------------------------------------------------------------------------              10.2   3.5   >-----------<  197      [03-29-19 @ 15:57]              30.3     135  |  93  |  5   ----------------------------<  120      [03-31-19 @ 06:28]  4.1   |  28  |  3.05        Ca     8.0     [03-31-19 @ 06:28]      Phos  3.6     [03-29-19 @ 15:57]            Creatinine Trend:  SCr 3.05 [03-31 @ 06:28]  SCr 3.90 [03-30 @ 07:18]  SCr 4.85 [03-29 @ 15:57]  SCr 3.10 [03-28 @ 06:37]  SCr 3.69 [03-27 @ 06:37]                  PTH -- (Ca 7.8)      [03-29-19 @ 20:38]   73  PTH -- (Ca 7.3)      [02-22-19 @ 02:49]   59  HbA1c 7.9      [02-27-19 @ 11:08]  TSH 0.71      [11-17-18 @ 08:25]  Lipid: chol 113, TG 86, HDL 59, LDL 37      [04-30-18 @ 06:44]
[facility]   [phone]  RENAL FOLLOW UP NOTE  --------------------------------------------------------------------------------  Chief Complaint:    24 hour events/subjective:    seen. states no sob     PAST HISTORY  --------------------------------------------------------------------------------  No significant changes to PMH, PSH, FHx, SHx, unless otherwise noted    ALLERGIES & MEDICATIONS  --------------------------------------------------------------------------------  Allergies    No Known Allergies    Intolerances      Standing Inpatient Medications  aspirin enteric coated 81 milliGRAM(s) Oral daily  atorvastatin 20 milliGRAM(s) Oral at bedtime  clopidogrel Tablet 75 milliGRAM(s) Oral daily  dextrose 5%. 1000 milliLiter(s) IV Continuous <Continuous>  dextrose 50% Injectable 12.5 Gram(s) IV Push once  dextrose 50% Injectable 25 Gram(s) IV Push once  DULoxetine 60 milliGRAM(s) Oral daily  heparin  Injectable 5000 Unit(s) SubCutaneous every 8 hours  insulin detemir injectable (LEVEMIR) 4 Unit(s) SubCutaneous at bedtime  insulin lispro (HumaLOG) corrective regimen sliding scale   SubCutaneous three times a day before meals  insulin lispro (HumaLOG) corrective regimen sliding scale   SubCutaneous at bedtime  insulin lispro Injectable (HumaLOG) 2 Unit(s) SubCutaneous three times a day with meals  metoprolol succinate ER 50 milliGRAM(s) Oral daily    PRN Inpatient Medications  dextrose 40% Gel 15 Gram(s) Oral once PRN  glucagon  Injectable 1 milliGRAM(s) IntraMuscular once PRN  oxyCODONE    5 mG/acetaminophen 325 mG 1 Tablet(s) Oral every 4 hours PRN      REVIEW OF SYSTEMS  --------------------------------------------------------------------------------    Gen: denies  fevers/chills,  CVS: denies chest pain/palpitations  Resp: denies SOB/Cough  GI: Denies N/V/Abd pain  : Denies dysuria    All other systems were reviewed and are negative, except as noted.    VITALS/PHYSICAL EXAM  --------------------------------------------------------------------------------  T(C): 36.7 (04-02-19 @ 11:41), Max: 37.3 (04-01-19 @ 20:57)  HR: 77 (04-02-19 @ 11:41) (77 - 84)  BP: 152/76 (04-02-19 @ 11:41) (152/76 - 156/87)  RR: 18 (04-02-19 @ 11:41) (18 - 18)  SpO2: 99% (04-02-19 @ 11:41) (97% - 99%)  Wt(kg): --        04-01-19 @ 07:01  -  04-02-19 @ 07:00  --------------------------------------------------------  IN: 1680 mL / OUT: 2900 mL / NET: -1220 mL    04-02-19 @ 07:01  -  04-02-19 @ 20:35  --------------------------------------------------------  IN: 420 mL / OUT: 0 mL / NET: 420 mL      Physical Exam:  	    Gen: chronically ill appearing F   	-   jvd ,   	Pulm: decrease bs no rales or ornchi   	CV: RRR, S1S2; no rub  	Abd: +BS, soft, nontender/nondistended  	: No suprapubic tenderness  	UE: Warm, no cyanosis  no clubbing,  no edema; no asterixis  	LE: Warm, no cyanosis  no clubbing, no edema   	    LABS/STUDIES  --------------------------------------------------------------------------------              11.9   4.6   >-----------<  243      [04-02-19 @ 06:27]              34.0     138  |  95  |  5   ----------------------------<  145      [04-02-19 @ 06:27]  3.8   |  27  |  3.25        Ca     8.2     [04-02-19 @ 06:27]      Mg     2.2     [04-02-19 @ 06:27]      Phos  2.5     [04-02-19 @ 06:27]            Creatinine Trend:  SCr 3.25 [04-02 @ 06:27]  SCr 4.68 [04-01 @ 07:02]  SCr 3.05 [03-31 @ 06:28]  SCr 3.90 [03-30 @ 07:18]  SCr 4.85 [03-29 @ 15:57]                  PTH -- (Ca 7.8)      [03-29-19 @ 20:38]   73  PTH -- (Ca 7.3)      [02-22-19 @ 02:49]   59  HbA1c 7.9      [02-27-19 @ 11:08]  TSH 0.71      [11-17-18 @ 08:25]  Lipid: chol 113, TG 86, HDL 59, LDL 37      [04-30-18 @ 06:44]
PULMONARY PROGRESS NOTE    NATHAN MEEKS  MRN-[id number]    Patient is a 63y old  Female who presents with a chief complaint of chest pain, SOB (29 Mar 2019 18:58)      HPI  feeling better today, no cough    ROS:   -neg    MEDICATIONS  (STANDING):  aspirin enteric coated 81 milliGRAM(s) Oral daily  atorvastatin 20 milliGRAM(s) Oral at bedtime  cinacalcet 60 milliGRAM(s) Oral daily  clopidogrel Tablet 75 milliGRAM(s) Oral daily  dextrose 5%. 1000 milliLiter(s) (50 mL/Hr) IV Continuous <Continuous>  dextrose 50% Injectable 12.5 Gram(s) IV Push once  dextrose 50% Injectable 25 Gram(s) IV Push once  DULoxetine 60 milliGRAM(s) Oral daily  heparin  Injectable 5000 Unit(s) SubCutaneous every 8 hours  insulin glargine Injectable (LANTUS) 5 Unit(s) SubCutaneous at bedtime  insulin lispro (HumaLOG) corrective regimen sliding scale   SubCutaneous three times a day before meals  insulin lispro (HumaLOG) corrective regimen sliding scale   SubCutaneous at bedtime  insulin lispro Injectable (HumaLOG) 3 Unit(s) SubCutaneous three times a day with meals  metoprolol succinate ER 50 milliGRAM(s) Oral daily  sevelamer carbonate 800 milliGRAM(s) Oral three times a day with meals    MEDICATIONS  (PRN):  dextrose 40% Gel 15 Gram(s) Oral once PRN Blood Glucose LESS THAN 70 milliGRAM(s)/deciliter  glucagon  Injectable 1 milliGRAM(s) IntraMuscular once PRN Glucose LESS THAN 70 milligrams/deciliter  oxyCODONE    5 mG/acetaminophen 325 mG 1 Tablet(s) Oral every 4 hours PRN Moderate Pain (4 - 6)      EXAM:  Vital Signs Last 24 Hrs  T(C): 36.7 (31 Mar 2019 09:00), Max: 36.8 (30 Mar 2019 21:04)  T(F): 98 (31 Mar 2019 09:00), Max: 98.3 (30 Mar 2019 21:04)  HR: 75 (31 Mar 2019 09:00) (73 - 82)  BP: 160/89 (31 Mar 2019 09:00) (139/76 - 169/81)  BP(mean): --  RR: 18 (31 Mar 2019 09:00) (17 - 19)  SpO2: 98% (31 Mar 2019 09:00) (96% - 100%)    GENERAL: The patient is awake and alert in no apparent distress.       LUNGS: more clear today        LABS/IMGAING: reviewed                                   10.2   3.5   )-----------( 197      ( 29 Mar 2019 15:57 )             30.3   03-31    135  |  93<L>  |  5<L>  ----------------------------<  120<H>  4.1   |  28  |  3.05<H>    Ca    8.0<L>      31 Mar 2019 06:28  Phos  3.6     03-29        < from: CT Chest No Cont (03.26.19 @ 08:05) >  IMPRESSION:   Difficult to delineate bulky mediastinal and bilateral hilar   lymphadenopathy .    Persistent right greater than left interlobular septal thickening   suggestive of asymmetric pulmonary edema.    Interval decrease in number of bilateral groundglass opacities which   likely were infectious.    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: Type 1 diabetes mellitus with other circulatory complication  Other hyperlipidemia: Other hyperlipidemia  Hypertension, unspecified type: Hypertension, unspecified type  Type 1 diabetes mellitus with diabetic peripheral angiopathy without gangrene: Type 1 diabetes mellitus with diabetic peripheral angiopathy without gangrene  Chronic systolic congestive heart failure: Chronic systolic congestive heart failure  ESRD (end stage renal disease): ESRD (end stage renal disease)  Opacity of lung on imaging study: Opacity of lung on imaging study  Other specified hypotension: Other specified hypotension  Hyperkalemia: Hyperkalemia  NSTEMI (non-ST elevated myocardial infarction): NSTEMI (non-ST elevated myocardial infarction)      RECS:  -cont current meds  -fu with  after gina Tate MD  [phone]
Patient is a 63y old  Female who presents with a chief complaint of chest pain, SOB (02 Apr 2019 10:52)      SUBJECTIVE / OVERNIGHT EVENTS: Comfortable without new complaints.   Review of Systems  chest pain no  palpitations no  sob no  nausea no  headache no    MEDICATIONS  (STANDING):  aspirin enteric coated 81 milliGRAM(s) Oral daily  atorvastatin 20 milliGRAM(s) Oral at bedtime  clopidogrel Tablet 75 milliGRAM(s) Oral daily  dextrose 5%. 1000 milliLiter(s) (50 mL/Hr) IV Continuous <Continuous>  dextrose 50% Injectable 12.5 Gram(s) IV Push once  dextrose 50% Injectable 25 Gram(s) IV Push once  DULoxetine 60 milliGRAM(s) Oral daily  heparin  Injectable 5000 Unit(s) SubCutaneous every 8 hours  insulin glargine Injectable (LANTUS) 4 Unit(s) SubCutaneous at bedtime  insulin lispro (HumaLOG) corrective regimen sliding scale   SubCutaneous three times a day before meals  insulin lispro (HumaLOG) corrective regimen sliding scale   SubCutaneous at bedtime  insulin lispro Injectable (HumaLOG) 2 Unit(s) SubCutaneous three times a day with meals  metoprolol succinate ER 50 milliGRAM(s) Oral daily    MEDICATIONS  (PRN):  dextrose 40% Gel 15 Gram(s) Oral once PRN Blood Glucose LESS THAN 70 milliGRAM(s)/deciliter  glucagon  Injectable 1 milliGRAM(s) IntraMuscular once PRN Glucose LESS THAN 70 milligrams/deciliter  oxyCODONE    5 mG/acetaminophen 325 mG 1 Tablet(s) Oral every 4 hours PRN Moderate Pain (4 - 6)      Vital Signs Last 24 Hrs  T(C): 36.7 (02 Apr 2019 11:41), Max: 37.3 (01 Apr 2019 20:57)  T(F): 98 (02 Apr 2019 11:41), Max: 99.1 (01 Apr 2019 20:57)  HR: 77 (02 Apr 2019 11:41) (70 - 84)  BP: 152/76 (02 Apr 2019 11:41) (146/84 - 156/87)  BP(mean): --  RR: 18 (02 Apr 2019 11:41) (18 - 18)  SpO2: 99% (02 Apr 2019 11:41) (97% - 99%)    PHYSICAL EXAM:  GENERAL: NAD  HEAD:  Atraumatic, Normocephalic  EYES: EOMI, PERRLA, conjunctiva and sclera clear  NECK: Supple, No JVD  CHEST/LUNG: Clear to auscultation bilaterally; No wheeze  HEART: Regular rate and rhythm; No murmurs, rubs, or gallops  ABDOMEN: Soft, Nontender, Nondistended; Bowel sounds present  EXTREMITIES:  2+bipedal edema  PSYCH: AAOx3  NEUROLOGY: non-focal  SKIN: No rashes or lesions    LABS:                        11.9   4.6   )-----------( 243      ( 02 Apr 2019 06:27 )             34.0     04-02    138  |  95<L>  |  5<L>  ----------------------------<  145<H>  3.8   |  27  |  3.25<H>    Ca    8.2<L>      02 Apr 2019 06:27  Phos  2.5     04-02  Mg     2.2     04-02        < from: Transthoracic Echocardiogram (04.02.19 @ 06:25) >  Conclusions:  1. Mitral annular calcification. Tethered mitral valve  leaflets with normal opening. Moderate-severe mitral  regurgitation.  2. Calcified trileaflet aortic valve with decreased  opening. Peak transaortic valve gradient equals 14 mm Hg,  mean transaortic valve gradient equals 7 mm Hg, estimated  aortic valve area equals 0.9 sqcm (by continuity equation),  aortic valve velocity time integral equals 42 cm,  consistent with severe aortic stenosis. Minimal aortic  regurgitation.  3. Mildly dilated left atrium.  LA volume index = 40 cc/m2.  An atrial septal closure device is seen on the interatrial  septum and appears well-seated with no residual interatrial  flow by color Doppler.  4. Mild left ventricular enlargement.  5. Moderate to severe segmental left ventricular systolic  dysfunction. The inferior and inferolateral walls are  akinetic. The inferoseptal and lateral walls are  hypokinetic. There is a false tendon in the LV cavity  (normal variant).  6. Normal right ventricular size with mildly decreased  right ventricular systolic function.  *** Compared with echocardiogram of 10/14/2018, LV systolic  function has decreased. Calculated CHRIS is smaller on  today's study, likely due to decreased cardiac output.    < end of copied text >            RADIOLOGY & ADDITIONAL TESTS:    Imaging Personally Reviewed:    Consultant(s) Notes Reviewed:      Care Discussed with Consultants/Other Providers:
Patient is a 63y old  Female who presents with a chief complaint of chest pain, SOB (04 Apr 2019 18:37)      SUBJECTIVE / OVERNIGHT EVENTS: Comfortable without new complaints.   Review of Systems  chest pain no  palpitations no  sob no  nausea no  headache no    MEDICATIONS  (STANDING):  aspirin enteric coated 81 milliGRAM(s) Oral daily  atorvastatin 20 milliGRAM(s) Oral at bedtime  clopidogrel Tablet 75 milliGRAM(s) Oral daily  dextrose 5%. 1000 milliLiter(s) (50 mL/Hr) IV Continuous <Continuous>  dextrose 50% Injectable 12.5 Gram(s) IV Push once  dextrose 50% Injectable 25 Gram(s) IV Push once  DULoxetine 60 milliGRAM(s) Oral daily  heparin  Injectable 5000 Unit(s) SubCutaneous every 8 hours  insulin detemir injectable (LEVEMIR) 4 Unit(s) SubCutaneous at bedtime  insulin lispro (HumaLOG) corrective regimen sliding scale   SubCutaneous three times a day before meals  insulin lispro (HumaLOG) corrective regimen sliding scale   SubCutaneous at bedtime  insulin lispro Injectable (HumaLOG) 4 Unit(s) SubCutaneous three times a day with meals  insulin lispro Injectable (HumaLOG) 2 Unit(s) SubCutaneous at bedtime  metoprolol succinate ER 50 milliGRAM(s) Oral daily    MEDICATIONS  (PRN):  dextrose 40% Gel 15 Gram(s) Oral once PRN Blood Glucose LESS THAN 70 milliGRAM(s)/deciliter  glucagon  Injectable 1 milliGRAM(s) IntraMuscular once PRN Glucose LESS THAN 70 milligrams/deciliter  oxyCODONE    5 mG/acetaminophen 325 mG 1 Tablet(s) Oral every 4 hours PRN Moderate Pain (4 - 6)      Vital Signs Last 24 Hrs  T(C): 36.7 (04 Apr 2019 12:13), Max: 36.8 (04 Apr 2019 08:47)  T(F): 98 (04 Apr 2019 12:13), Max: 98.2 (04 Apr 2019 08:47)  HR: 82 (04 Apr 2019 12:13) (74 - 85)  BP: 143/77 (04 Apr 2019 12:13) (130/81 - 143/77)  BP(mean): --  RR: 18 (04 Apr 2019 12:13) (17 - 18)  SpO2: 100% (04 Apr 2019 12:13) (96% - 100%)    PHYSICAL EXAM:  GENERAL: NAD, well-developed  HEAD:  Atraumatic, Normocephalic  EYES: EOMI, PERRLA, conjunctiva and sclera clear  NECK: Supple, No JVD  CHEST/LUNG: Clear to auscultation bilaterally; No wheeze  HEART: Regular rate and rhythm; No murmurs, rubs, or gallops  ABDOMEN: Soft, Nontender, Nondistended; Bowel sounds present  EXTREMITIES:  2+ Peripheral Pulses, No clubbing, cyanosis, or edema  PSYCH: AAOx3  NEUROLOGY: non-focal  SKIN: No rashes or lesions    LABS:                        12.3   4.1   )-----------( 263      ( 04 Apr 2019 06:26 )             40.1     04-04    133<L>  |  91<L>  |  16  ----------------------------<  429<H>  4.0   |  26  |  3.65<H>    Ca    8.0<L>      04 Apr 2019 06:26      PT/INR - ( 03 Apr 2019 06:48 )   PT: 11.7 sec;   INR: 1.02 ratio         PTT - ( 03 Apr 2019 06:48 )  PTT:30.6 sec            RADIOLOGY & ADDITIONAL TESTS:    Imaging Personally Reviewed:    Consultant(s) Notes Reviewed:      Care Discussed with Consultants/Other Providers:
63 yr old female with ESRD on HD, severe peripheral arterial disease s/p bilateral lower extremity bypass surgery,  admitted with recent MI.  Cardiac workup has revealed patent LAD, and diffusely diseased circumflex and right coronary arteries, moderate severe mitral regurgitation, severely diminished LV function.  She has only mild aortic stenosis.  She has minimal conduit for cabg surgery.  I feel she is a very high risk candidate for CABG and mitral valve surgery.  I recommend continued medical therapy as her best option.  I have discussed these findings in detail with the patient.  She understands and has no desire for surgery.
Cardiovascular Disease Progress Note    Overnight events: No acute events overnight.  denies any cp/sob/pnd/orthopnea/edema  Otherwise review of systems negative    Objective Findings:  T(C): 36.7 (19 @ 04:20), Max: 36.8 (19 @ 09:00)  HR: 73 (19 @ 07:20) (66 - 89)  BP: 162/81 (19 @ 07:20) (129/69 - 167/77)  RR: 18 (19 @ 04:20) (17 - 19)  SpO2: 97% (19 @ 04:20) (94% - 99%)  Wt(kg): --  Daily     Daily Weight in k.3 (28 Mar 2019 04:20)      Physical Exam:  Gen: NAD  HEENT: EOMI  CV: RRR, normal S1 + S2, no m/r/g  Lungs: CTAB  Abd: soft, non-tender  Ext: No edema    Telemetry: nsr    Laboratory Data:                        9.8    3.8   )-----------( 206      ( 27 Mar 2019 06:37 )             28.8         138  |  97  |  10  ----------------------------<  183<H>  4.4   |  27  |  3.10<H>    Ca    8.4      28 Mar 2019 06:37  Phos  4.0     -  Mg     2.0                     Inpatient Medications:  MEDICATIONS  (STANDING):  aspirin enteric coated 81 milliGRAM(s) Oral daily  atorvastatin 20 milliGRAM(s) Oral at bedtime  cinacalcet 60 milliGRAM(s) Oral daily  clopidogrel Tablet 75 milliGRAM(s) Oral daily  dextrose 5%. 1000 milliLiter(s) (50 mL/Hr) IV Continuous <Continuous>  dextrose 50% Injectable 12.5 Gram(s) IV Push once  dextrose 50% Injectable 25 Gram(s) IV Push once  DULoxetine 60 milliGRAM(s) Oral daily  heparin  Injectable 5000 Unit(s) SubCutaneous every 8 hours  insulin glargine Injectable (LANTUS) 3 Unit(s) SubCutaneous at bedtime  insulin lispro (HumaLOG) corrective regimen sliding scale   SubCutaneous three times a day before meals  insulin lispro (HumaLOG) corrective regimen sliding scale   SubCutaneous at bedtime  insulin lispro Injectable (HumaLOG) 2 Unit(s) SubCutaneous three times a day with meals  metoprolol tartrate 25 milliGRAM(s) Oral two times a day  sevelamer carbonate 800 milliGRAM(s) Oral three times a day with meals      Assessment:  -hyperkalemia  -lung mass s/p bx  -chest pain  -s/p LHC   -volume overload  -ischemic cardiomyopathy, cad s/p multiple stents  -esrd on hd  -PAD s/p prior intervention   -remote TIA      Recs:  -sob, volume overload --> fluid removal with HD. sob resolved  -s/p LHC with Dr Vela  --> severe and diffuse instent restenosis along RCA --> unable to stent due to multiple layers of stenting and prior brachytherapy. can consider complex intervention with dr lidia rutledge once more euvolemic. c/w medical treatment with anti-platelet, statins and anti-anginals for now. however her cp appears atypical and its possible further interventions will not make her feel better. would change metop to succinate 50mg daily for anti-anginal effect and for pAT/NSVT  -monitor on tele. c/w beta blockers for nsvt/pat/cad  -hx of prolonged qtc. avoid qtc prolonging meds  -c/w asa, plavix, statin for ischemic cardiomyopathy/CAD/PAD  -c/w toprol and hydralazine for ischemic cardiomyopathy and HTN   -dvt ppx        Over 25 minutes spent on total encounter; more than 50% of the visit was spent counseling and/or coordinating care by the attending physician.      Julián Biswas MD   Cardiovascular Disease  [phone]
Cardiovascular Disease Progress Note    Overnight events: No acute events overnight.  evaluated by dr hebert - waqar for medical management of cardiomyopathy/valvular disease. denies any cp/sob/palps/dizziness  Otherwise review of systems negative    Objective Findings:  T(C): 36.7 (19 @ 04:56), Max: 36.7 (19 @ 04:56)  HR: 76 (19 @ 04:56) (71 - 85)  BP: 131/72 (19 @ 04:56) (130/81 - 159/87)  RR: 18 (19 @ 04:56) (18 - 18)  SpO2: 96% (19 @ 04:56) (96% - 99%)  Wt(kg): --  Daily     Daily Weight in k.9 (2019 04:56)      Physical Exam:  Gen: NAD  HEENT: EOMI  CV: RRR, normal S1 + S2, 2/6 heraclio  Lungs: CTAB  Abd: soft, non-tender  Ext: No edema        Laboratory Data:                        12.3   4.1   )-----------( 263      ( 2019 06:26 )             40.1     04-04    133<L>  |  91<L>  |  16  ----------------------------<  429<H>  4.0   |  26  |  3.65<H>    Ca    8.0<L>      2019 06:26      PT/INR - ( 2019 06:48 )   PT: 11.7 sec;   INR: 1.02 ratio         PTT - ( 2019 06:48 )  PTT:30.6 sec          Inpatient Medications:  MEDICATIONS  (STANDING):  aspirin enteric coated 81 milliGRAM(s) Oral daily  atorvastatin 20 milliGRAM(s) Oral at bedtime  clopidogrel Tablet 75 milliGRAM(s) Oral daily  dextrose 5%. 1000 milliLiter(s) (50 mL/Hr) IV Continuous <Continuous>  dextrose 50% Injectable 12.5 Gram(s) IV Push once  dextrose 50% Injectable 25 Gram(s) IV Push once  DULoxetine 60 milliGRAM(s) Oral daily  heparin  Injectable 5000 Unit(s) SubCutaneous every 8 hours  insulin detemir injectable (LEVEMIR) 4 Unit(s) SubCutaneous at bedtime  insulin lispro (HumaLOG) corrective regimen sliding scale   SubCutaneous three times a day before meals  insulin lispro (HumaLOG) corrective regimen sliding scale   SubCutaneous at bedtime  insulin lispro Injectable (HumaLOG) 3 Unit(s) SubCutaneous three times a day with meals  metoprolol succinate ER 50 milliGRAM(s) Oral daily      Assessment:  -hyperkalemia  -lung mass s/p bx  -chest pain  -s/p LHC   -volume overload  -ischemic cardiomyopathy, cad s/p multiple stents  -esrd on hd  -PAD s/p prior intervention   -remote TIA      Recs:  -sob, volume overload --> fluid removal with HD. sob resolved  -s/p LHC with Dr Vela  --> severe and diffuse instent restenosis along RCA --> unable to stent due to multiple layers of stenting and prior brachytherapy. can consider complex intervention if true ischemic sx develop. c/w medical treatment with anti-platelet, statins and anti-anginals for now.  c/w metop succinate 50mg daily for anti-anginal effect and for pAT/NSVT  -monitor on tele. c/w beta blockers for nsvt/pat/cad  -hx of prolonged qtc. avoid qtc prolonging meds  -s/p TTE: mod-severe MR, severe AS (low flow-low gradient), mod-severe LV dysfunction --> CTS consult with dr hebert appreciated. plan is for medical management given surgical risk and patient preference  -c/w asa, plavix, statin for ischemic cardiomyopathy/CAD/PAD  -c/w toprol and hydralazine for ischemic cardiomyopathy and HTN   -dvt ppx          Over 25 minutes spent on total encounter; more than 50% of the visit was spent counseling and/or coordinating care by the attending physician.      Julián Biswas MD   Cardiovascular Disease  [phone]
Cardiovascular Disease Progress Note    Overnight events: No acute events overnight.  no cp/sob/palps/dizziness. mild hypoglycemia yesterday  Otherwise review of systems negative    Objective Findings:  T(C): 36.8 (19 @ 04:12), Max: 36.9 (19 @ 21:01)  HR: 79 (19 @ 04:12) (71 - 81)  BP: 166/80 (19 @ 04:12) (149/73 - 166/80)  RR: 18 (19 @ 04:12) (18 - 18)  SpO2: 97% (19 @ 04:12) (97% - 99%)  Wt(kg): --  Daily     Daily Weight in k.7 (2019 04:12)      Physical Exam:  Gen: NAD  HEENT: EOMI  CV: RRR, normal S1 + S2, no m/r/g  Lungs: CTAB  Abd: soft, non-tender  Ext: No edema    Telemetry: nsr    Laboratory Data:        136  |  92<L>  |  10  ----------------------------<  187<H>  4.1   |  27  |  4.68<H>    Ca    7.9<L>      2019 07:02                Inpatient Medications:  MEDICATIONS  (STANDING):  aspirin enteric coated 81 milliGRAM(s) Oral daily  atorvastatin 20 milliGRAM(s) Oral at bedtime  cinacalcet 60 milliGRAM(s) Oral daily  clopidogrel Tablet 75 milliGRAM(s) Oral daily  dextrose 5%. 1000 milliLiter(s) (50 mL/Hr) IV Continuous <Continuous>  dextrose 50% Injectable 12.5 Gram(s) IV Push once  dextrose 50% Injectable 25 Gram(s) IV Push once  DULoxetine 60 milliGRAM(s) Oral daily  heparin  Injectable 5000 Unit(s) SubCutaneous every 8 hours  insulin glargine Injectable (LANTUS) 4 Unit(s) SubCutaneous at bedtime  insulin lispro (HumaLOG) corrective regimen sliding scale   SubCutaneous three times a day before meals  insulin lispro (HumaLOG) corrective regimen sliding scale   SubCutaneous at bedtime  insulin lispro Injectable (HumaLOG) 3 Unit(s) SubCutaneous three times a day with meals  metoprolol succinate ER 50 milliGRAM(s) Oral daily  sevelamer carbonate 800 milliGRAM(s) Oral three times a day with meals      Assessment:  -hyperkalemia  -lung mass s/p bx  -chest pain  -s/p LHC   -volume overload  -ischemic cardiomyopathy, cad s/p multiple stents  -esrd on hd  -PAD s/p prior intervention   -remote TIA      Recs:  -sob, volume overload --> fluid removal with HD. sob resolved  -s/p LHC with Dr Vela  --> severe and diffuse instent restenosis along RCA --> unable to stent due to multiple layers of stenting and prior brachytherapy. can consider complex intervention with dr lidia rutledge once more euvolemic. c/w medical treatment with anti-platelet, statins and anti-anginals for now. however her cp appears atypical and its possible further interventions will not make her feel better. c/w metop succinate 50mg daily for anti-anginal effect and for pAT/NSVT  -monitor on tele. c/w beta blockers for nsvt/pat/cad  -hx of prolonged qtc. avoid qtc prolonging meds  -awaiting TTE  -c/w asa, plavix, statin for ischemic cardiomyopathy/CAD/PAD  -c/w toprol and hydralazine for ischemic cardiomyopathy and HTN   -dvt ppx        Over 25 minutes spent on total encounter; more than 50% of the visit was spent counseling and/or coordinating care by the attending physician.      Julián Biswas MD   Cardiovascular Disease  [phone]
Cardiovascular Disease Progress Note    Overnight events: No acute events overnight.  no cp/sob/palps/edema  Otherwise review of systems negative    Objective Findings:  T(C): 36.8 (19 @ 04:20), Max: 36.8 (19 @ 12:50)  HR: 83 (19 @ 04:20) (74 - 90)  BP: 150/79 (19 @ 04:20) (147/80 - 163/81)  RR: 18 (19 @ 04:20) (17 - 18)  SpO2: 96% (19 @ 04:20) (95% - 99%)  Wt(kg): --  Daily     Daily Weight in k (30 Mar 2019 06:00)      Physical Exam:  Gen: NAD  HEENT: EOMI  CV: RRR, normal S1 + S2, no m/r/g  Lungs: CTAB  Abd: soft, non-tender  Ext: No edema    Telemetry: nsr    Laboratory Data:                        10.2   3.5   )-----------( 197      ( 29 Mar 2019 15:57 )             30.3         136  |  94<L>  |  9   ----------------------------<  156<H>  4.2   |  25  |  3.90<H>    Ca    7.9<L>      30 Mar 2019 07:18  Phos  3.6                     Inpatient Medications:  MEDICATIONS  (STANDING):  aspirin enteric coated 81 milliGRAM(s) Oral daily  atorvastatin 20 milliGRAM(s) Oral at bedtime  cinacalcet 60 milliGRAM(s) Oral daily  clopidogrel Tablet 75 milliGRAM(s) Oral daily  dextrose 5%. 1000 milliLiter(s) (50 mL/Hr) IV Continuous <Continuous>  dextrose 50% Injectable 12.5 Gram(s) IV Push once  dextrose 50% Injectable 25 Gram(s) IV Push once  DULoxetine 60 milliGRAM(s) Oral daily  heparin  Injectable 5000 Unit(s) SubCutaneous every 8 hours  insulin glargine Injectable (LANTUS) 5 Unit(s) SubCutaneous at bedtime  insulin lispro (HumaLOG) corrective regimen sliding scale   SubCutaneous three times a day before meals  insulin lispro (HumaLOG) corrective regimen sliding scale   SubCutaneous at bedtime  insulin lispro Injectable (HumaLOG) 3 Unit(s) SubCutaneous three times a day with meals  metoprolol succinate ER 50 milliGRAM(s) Oral daily  sevelamer carbonate 800 milliGRAM(s) Oral three times a day with meals      Assessment:  -hyperkalemia  -lung mass s/p bx  -chest pain  -s/p LHC   -volume overload  -ischemic cardiomyopathy, cad s/p multiple stents  -esrd on hd  -PAD s/p prior intervention   -remote TIA      Recs:  -sob, volume overload --> fluid removal with HD. sob resolved  -s/p LHC with Dr Vela  --> severe and diffuse instent restenosis along RCA --> unable to stent due to multiple layers of stenting and prior brachytherapy. can consider complex intervention with dr lidia rutledge once more euvolemic. c/w medical treatment with anti-platelet, statins and anti-anginals for now. however her cp appears atypical and its possible further interventions will not make her feel better. c/w metop succinate 50mg daily for anti-anginal effect and for pAT/NSVT  -monitor on tele. c/w beta blockers for nsvt/pat/cad  -hx of prolonged qtc. avoid qtc prolonging meds  -c/w asa, plavix, statin for ischemic cardiomyopathy/CAD/PAD  -c/w toprol and hydralazine for ischemic cardiomyopathy and HTN   -dvt ppx        Over 25 minutes spent on total encounter; more than 50% of the visit was spent counseling and/or coordinating care by the attending physician.      Julián Biswas MD   Cardiovascular Disease  [phone]
Cardiovascular Disease Progress Note    Overnight events: No acute events overnight.  s/p tte results noted. no cp/sob/palps/edema  Otherwise review of systems negative    Objective Findings:  T(C): 36.6 (19 @ 04:56), Max: 36.7 (19 @ 11:41)  HR: 74 (19 @ 04:56) (74 - 82)  BP: 151/76 (19 @ 04:56) (130/81 - 152/76)  RR: 18 (19 @ 04:56) (18 - 18)  SpO2: 96% (19 @ 04:56) (96% - 100%)  Wt(kg): --  Daily     Daily Weight in k.8 (2019 04:56)      Physical Exam:  Gen: NAD  HEENT: EOMI  CV: RRR, normal S1 + S2, no m/r/g  Lungs: CTAB  Abd: soft, non-tender  Ext: No edema    Telemetry: nsr    Laboratory Data:                        11.0   3.7   )-----------( 219      ( 2019 06:48 )             34.9     04-03    137  |  94<L>  |  18  ----------------------------<  252<H>  4.1   |  26  |  4.94<H>    Ca    7.7<L>      2019 06:47  Phos  2.5     04-02  Mg     2.2     04-02      PT/INR - ( 2019 06:48 )   PT: 11.7 sec;   INR: 1.02 ratio         PTT - ( 2019 06:48 )  PTT:30.6 sec          Inpatient Medications:  MEDICATIONS  (STANDING):  aspirin enteric coated 81 milliGRAM(s) Oral daily  atorvastatin 20 milliGRAM(s) Oral at bedtime  clopidogrel Tablet 75 milliGRAM(s) Oral daily  dextrose 5%. 1000 milliLiter(s) (50 mL/Hr) IV Continuous <Continuous>  dextrose 50% Injectable 12.5 Gram(s) IV Push once  dextrose 50% Injectable 25 Gram(s) IV Push once  DULoxetine 60 milliGRAM(s) Oral daily  heparin  Injectable 5000 Unit(s) SubCutaneous every 8 hours  insulin detemir injectable (LEVEMIR) 4 Unit(s) SubCutaneous at bedtime  insulin lispro (HumaLOG) corrective regimen sliding scale   SubCutaneous three times a day before meals  insulin lispro (HumaLOG) corrective regimen sliding scale   SubCutaneous at bedtime  insulin lispro Injectable (HumaLOG) 2 Unit(s) SubCutaneous three times a day with meals  metoprolol succinate ER 50 milliGRAM(s) Oral daily      Assessment:  -hyperkalemia  -lung mass s/p bx  -chest pain  -s/p LHC   -volume overload  -ischemic cardiomyopathy, cad s/p multiple stents  -esrd on hd  -PAD s/p prior intervention   -remote TIA      Recs:  -sob, volume overload --> fluid removal with HD. sob resolved  -s/p LHC with Dr Vela  --> severe and diffuse instent restenosis along RCA --> unable to stent due to multiple layers of stenting and prior brachytherapy. can consider complex intervention with dr lidia rutledge once more euvolemic. c/w medical treatment with anti-platelet, statins and anti-anginals for now. however her cp appears atypical and its possible further interventions will not make her feel better. c/w metop succinate 50mg daily for anti-anginal effect and for pAT/NSVT  -monitor on tele. c/w beta blockers for nsvt/pat/cad  -hx of prolonged qtc. avoid qtc prolonging meds  -s/p TTE: mod-severe MR, severe AS (low flow-low gradient), mod-severe LV dysfunction --> f/u CTS/dr hebert recs  -c/w asa, plavix, statin for ischemic cardiomyopathy/CAD/PAD  -c/w toprol and hydralazine for ischemic cardiomyopathy and HTN   -dvt ppx        Over 25 minutes spent on total encounter; more than 50% of the visit was spent counseling and/or coordinating care by the attending physician.      Julián Biswas MD   Cardiovascular Disease  [phone]
Cardiovascular Disease Progress Note    Overnight events: No acute events overnight.  sob improved. no cp/palps/edema  Otherwise review of systems negative    Objective Findings:  T(C): 36.8 (19 @ 04:43), Max: 36.9 (19 @ 20:46)  HR: 69 (19 @ 04:43) (64 - 73)  BP: 128/72 (19 @ 04:43) (128/72 - 159/84)  RR: 18 (19 @ 04:43) (17 - 18)  SpO2: 98% (19 @ 04:43) (94% - 100%)  Wt(kg): --  Daily     Daily Weight in k.8 (29 Mar 2019 04:43)      Physical Exam:  Gen: NAD  HEENT: EOMI  CV: RRR, normal S1 + S2, no m/r/g  Lungs: CTAB  Abd: soft, non-tender  Ext: No edema    Telemetry:    Laboratory Data:        138  |  97  |  10  ----------------------------<  183<H>  4.4   |  27  |  3.10<H>    Ca    8.4      28 Mar 2019 06:37  Mg     2.0                     Inpatient Medications:  MEDICATIONS  (STANDING):  aspirin enteric coated 81 milliGRAM(s) Oral daily  atorvastatin 20 milliGRAM(s) Oral at bedtime  cinacalcet 60 milliGRAM(s) Oral daily  clopidogrel Tablet 75 milliGRAM(s) Oral daily  dextrose 5%. 1000 milliLiter(s) (50 mL/Hr) IV Continuous <Continuous>  dextrose 50% Injectable 12.5 Gram(s) IV Push once  dextrose 50% Injectable 25 Gram(s) IV Push once  DULoxetine 60 milliGRAM(s) Oral daily  heparin  Injectable 5000 Unit(s) SubCutaneous every 8 hours  insulin glargine Injectable (LANTUS) 7 Unit(s) SubCutaneous at bedtime  insulin lispro (HumaLOG) corrective regimen sliding scale   SubCutaneous three times a day before meals  insulin lispro (HumaLOG) corrective regimen sliding scale   SubCutaneous at bedtime  insulin lispro Injectable (HumaLOG) 3 Unit(s) SubCutaneous three times a day with meals  metoprolol succinate ER 50 milliGRAM(s) Oral daily  sevelamer carbonate 800 milliGRAM(s) Oral three times a day with meals      Assessment:  -hyperkalemia  -lung mass s/p bx  -chest pain  -s/p LHC   -volume overload  -ischemic cardiomyopathy, cad s/p multiple stents  -esrd on hd  -PAD s/p prior intervention   -remote TIA      Recs:  -sob, volume overload --> fluid removal with HD. sob resolved  -s/p LHC with Dr Vela  --> severe and diffuse instent restenosis along RCA --> unable to stent due to multiple layers of stenting and prior brachytherapy. can consider complex intervention with dr lidia rutledge once more euvolemic. c/w medical treatment with anti-platelet, statins and anti-anginals for now. however her cp appears atypical and its possible further interventions will not make her feel better. c/w metop succinate 50mg daily for anti-anginal effect and for pAT/NSVT  -monitor on tele. c/w beta blockers for nsvt/pat/cad  -hx of prolonged qtc. avoid qtc prolonging meds  -c/w asa, plavix, statin for ischemic cardiomyopathy/CAD/PAD  -c/w toprol and hydralazine for ischemic cardiomyopathy and HTN   -dvt ppx        Over 25 minutes spent on total encounter; more than 50% of the visit was spent counseling and/or coordinating care by the attending physician.      Julián Biswas MD   Cardiovascular Disease  [phone]
Cardiovascular Disease Progress Note    Overnight events: c/o sob this am. unable to remove fluid with hd yest 2/2 hypotension. o2 sat 100%. blood gas and labs sent. denies any cp  Otherwise review of systems negative    Objective Findings:  T(C): 36.5 (19 @ 06:43), Max: 36.9 (19 @ 19:27)  HR: 77 (19 @ 06:43) (75 - 83)  BP: 138/72 (19 @ 06:43) (130/86 - 158/75)  RR: 18 (19 @ 06:43) (16 - 20)  SpO2: 100% (19 @ 06:43) (95% - 100%)  Wt(kg): --  Daily Height in cm: 162.56 (26 Mar 2019 20:23)    Daily Weight in k.1 (27 Mar 2019 03:36)      Physical Exam:  Gen: NAD  HEENT: EOMI  CV: RRR, normal S1 + S2, no m/r/g  Lungs: CTAB  Abd: soft, non-tender  Ext: No edema    Telemetry: nsr pat nsvt    Laboratory Data:                        9.8    3.8   )-----------( 206      ( 27 Mar 2019 06:37 )             28.8         139  |  96  |  14  ----------------------------<  133<H>  4.8   |  27  |  3.69<H>    Ca    8.6      27 Mar 2019 06:37  Phos  4.0       Mg     2.2         TPro  6.7  /  Alb  4.2  /  TBili  0.3  /  DBili  x   /  AST  20  /  ALT  12  /  AlkPhos  69      PT/INR - ( 25 Mar 2019 22:41 )   PT: 12.0 sec;   INR: 1.05 ratio                   Inpatient Medications:  MEDICATIONS  (STANDING):  aspirin enteric coated 81 milliGRAM(s) Oral daily  atorvastatin 20 milliGRAM(s) Oral at bedtime  cinacalcet 60 milliGRAM(s) Oral daily  clopidogrel Tablet 75 milliGRAM(s) Oral daily  dextrose 5%. 1000 milliLiter(s) (50 mL/Hr) IV Continuous <Continuous>  dextrose 50% Injectable 12.5 Gram(s) IV Push once  dextrose 50% Injectable 25 Gram(s) IV Push once  DULoxetine 60 milliGRAM(s) Oral daily  heparin  Injectable 5000 Unit(s) SubCutaneous every 8 hours  insulin glargine Injectable (LANTUS) 5 Unit(s) SubCutaneous at bedtime  insulin lispro (HumaLOG) corrective regimen sliding scale   SubCutaneous three times a day before meals  insulin lispro (HumaLOG) corrective regimen sliding scale   SubCutaneous at bedtime  metoprolol tartrate 25 milliGRAM(s) Oral two times a day  sevelamer carbonate 800 milliGRAM(s) Oral three times a day with meals      Assessment:  -hyperkalemia  -lung mass s/p bx  -chest pain  -s/p LHC   -volume overload  -ischemic cardiomyopathy, cad s/p multiple stents  -esrd on hd  -PAD s/p prior intervention   -remote TIA      Recs:  -monitor glucose. c/w insulin regimen  -sob, volume overload --> fluid removal with HD. f/u labs/abg/cxr  -s/p LHC with Dr Vela  --> severe and diffuse instent restenosis along RCA --> unable to stent due to multiple layers of stenting and prior brachytherapy. plan for complex intervention with dr lidia rutledge once more euvolemic. c/w medical treatment with anti-platelet, statins and anti-anginals for now  -monitor on tele. c/w beta blockers for nsvt/pat/cad  -hx of prolonged qtc. avoid qtc prolonging meds  -c/w asa, plavix, statin for ischemic cardiomyopathy/CAD/PAD  -c/w toprol and hydralazine for ischemic cardiomyopathy and HTN   -dvt ppx        Over 25 minutes spent on total encounter; more than 50% of the visit was spent counseling and/or coordinating care by the attending physician.      Julián Biswas MD   Cardiovascular Disease  [phone]
[facility]   [phone]  DIALYSIS NOTE  Chief Complaint: ESRD/Ongoing hemodialysis requirement.     24 hour events/subjective:    seen earlier . states feels better         ALLERGIES & MEDICATIONS  --------------------------------------------------------------------------------  Allergies    No Known Allergies    Intolerances      Standing Inpatient Medications  aspirin enteric coated 81 milliGRAM(s) Oral daily  atorvastatin 20 milliGRAM(s) Oral at bedtime  clopidogrel Tablet 75 milliGRAM(s) Oral daily  dextrose 5%. 1000 milliLiter(s) IV Continuous <Continuous>  dextrose 50% Injectable 12.5 Gram(s) IV Push once  dextrose 50% Injectable 25 Gram(s) IV Push once  DULoxetine 60 milliGRAM(s) Oral daily  heparin  Injectable 5000 Unit(s) SubCutaneous every 8 hours  insulin detemir injectable (LEVEMIR) 4 Unit(s) SubCutaneous at bedtime  insulin lispro (HumaLOG) corrective regimen sliding scale   SubCutaneous three times a day before meals  insulin lispro (HumaLOG) corrective regimen sliding scale   SubCutaneous at bedtime  insulin lispro Injectable (HumaLOG) 3 Unit(s) SubCutaneous three times a day with meals  metoprolol succinate ER 50 milliGRAM(s) Oral daily    PRN Inpatient Medications  dextrose 40% Gel 15 Gram(s) Oral once PRN  glucagon  Injectable 1 milliGRAM(s) IntraMuscular once PRN  oxyCODONE    5 mG/acetaminophen 325 mG 1 Tablet(s) Oral every 4 hours PRN      REVIEW OF SYSTEMS  --------------------------------------------------------------------------------  no itching or rash  no fever or chill  no cp or palp   no sob or cough   no N/V/D/ no abd pain   ext no edema no pain         VITALS/PHYSICAL EXAM  --------------------------------------------------------------------------------  T(C): 36.5 (04-03-19 @ 12:58), Max: 36.6 (04-02-19 @ 21:03)  HR: 85 (04-03-19 @ 12:58) (71 - 85)  BP: 147/76 (04-03-19 @ 12:58) (130/81 - 159/87)  RR: 18 (04-03-19 @ 12:58) (18 - 18)  SpO2: 99% (04-03-19 @ 12:58) (96% - 100%)  Wt(kg): --        04-02-19 @ 07:01  -  04-03-19 @ 07:00  --------------------------------------------------------  IN: 1000 mL / OUT: 0 mL / NET: 1000 mL    04-03-19 @ 07:01  -  04-03-19 @ 15:52  --------------------------------------------------------  IN: 240 mL / OUT: 2100 mL / NET: -1860 mL      Physical Exam:  		    	Gen: alert oriented place person and date   	Pulm: Decreased breath sounds b/l bases no rales or ronchi  	CV: RRR, S1/S2  	Abd: +BS, soft, nontender/nondistended  	Extremity: No cyanosis, trace edema no clubbing  	Neuro: No focal deficits    LABS/STUDIES  --------------------------------------------------------------------------------              11.0   3.7   >-----------<  219      [04-03-19 @ 06:48]              34.9     137  |  94  |  18  ----------------------------<  252      [04-03-19 @ 06:47]  4.1   |  26  |  4.94        Ca     7.7     [04-03-19 @ 06:47]      Mg     2.2     [04-02-19 @ 06:27]      Phos  2.5     [04-02-19 @ 06:27]      PT/INR: PT 11.7 , INR 1.02       [04-03-19 @ 06:48]  PTT: 30.6       [04-03-19 @ 06:48]
[facility]   [phone]  RENAL FOLLOW UP NOTE  --------------------------------------------------------------------------------  Chief Complaint:    24 hour events/subjective:  seen earlier.   states feels well and wants to go home.     PAST HISTORY  --------------------------------------------------------------------------------  No significant changes to PMH, PSH, FHx, SHx, unless otherwise noted    ALLERGIES & MEDICATIONS  --------------------------------------------------------------------------------  Allergies    No Known Allergies    Intolerances      Standing Inpatient Medications  aspirin enteric coated 81 milliGRAM(s) Oral daily  atorvastatin 20 milliGRAM(s) Oral at bedtime  clopidogrel Tablet 75 milliGRAM(s) Oral daily  dextrose 5%. 1000 milliLiter(s) IV Continuous <Continuous>  dextrose 50% Injectable 12.5 Gram(s) IV Push once  dextrose 50% Injectable 25 Gram(s) IV Push once  DULoxetine 60 milliGRAM(s) Oral daily  heparin  Injectable 5000 Unit(s) SubCutaneous every 8 hours  insulin detemir injectable (LEVEMIR) 4 Unit(s) SubCutaneous at bedtime  insulin lispro (HumaLOG) corrective regimen sliding scale   SubCutaneous three times a day before meals  insulin lispro (HumaLOG) corrective regimen sliding scale   SubCutaneous at bedtime  insulin lispro Injectable (HumaLOG) 4 Unit(s) SubCutaneous three times a day with meals  insulin lispro Injectable (HumaLOG) 2 Unit(s) SubCutaneous at bedtime  metoprolol succinate ER 50 milliGRAM(s) Oral daily    PRN Inpatient Medications  dextrose 40% Gel 15 Gram(s) Oral once PRN  glucagon  Injectable 1 milliGRAM(s) IntraMuscular once PRN  oxyCODONE    5 mG/acetaminophen 325 mG 1 Tablet(s) Oral every 4 hours PRN      REVIEW OF SYSTEMS  --------------------------------------------------------------------------------    Gen: denies fevers/chills,  CVS: denies chest pain/palpitations  Resp: denies SOB/Cough  GI: Denies N/V/Abd pain  : Denies dysuria/oliguria/hematuria    All other systems were reviewed and are negative, except as noted.    VITALS/PHYSICAL EXAM  --------------------------------------------------------------------------------  T(C): 36.7 (04-04-19 @ 12:13), Max: 36.8 (04-04-19 @ 08:47)  HR: 82 (04-04-19 @ 12:13) (74 - 85)  BP: 143/77 (04-04-19 @ 12:13) (130/81 - 143/77)  RR: 18 (04-04-19 @ 12:13) (17 - 18)  SpO2: 100% (04-04-19 @ 12:13) (96% - 100%)  Wt(kg): --        04-03-19 @ 07:01  -  04-04-19 @ 07:00  --------------------------------------------------------  IN: 660 mL / OUT: 2100 mL / NET: -1440 mL    04-04-19 @ 07:01  -  04-04-19 @ 18:37  --------------------------------------------------------  IN: 480 mL / OUT: 0 mL / NET: 480 mL      Physical Exam:  	    Physical Exam:  	Gen: alert oriented place person and date   	Pulm: Decreased breath sounds b/l bases. no rales or ronchi or wheezing  	CV: RRR, S1/S2. no rub  	Abd: +BS, soft, nontender/nondistended  	: No suprapubic tenderness.               Extremity: No cyanosis, no edema no clubbing      LABS/STUDIES  --------------------------------------------------------------------------------              12.3   4.1   >-----------<  263      [04-04-19 @ 06:26]              40.1     133  |  91  |  16  ----------------------------<  429      [04-04-19 @ 06:26]  4.0   |  26  |  3.65        Ca     8.0     [04-04-19 @ 06:26]      PT/INR: PT 11.7 , INR 1.02       [04-03-19 @ 06:48]  PTT: 30.6       [04-03-19 @ 06:48]      Creatinine Trend:  SCr 3.65 [04-04 @ 06:26]  SCr 3.60 [04-04 @ 00:16]  SCr 4.94 [04-03 @ 06:47]  SCr 3.25 [04-02 @ 06:27]  SCr 4.68 [04-01 @ 07:02]                  PTH -- (Ca 7.8)      [03-29-19 @ 20:38]   73  PTH -- (Ca 7.3)      [02-22-19 @ 02:49]   59  HbA1c 7.9      [02-27-19 @ 11:08]  TSH 0.71      [11-17-18 @ 08:25]  Lipid: chol 113, TG 86, HDL 59, LDL 37      [04-30-18 @ 06:44]
Chief Complaint: T1DM    History: Patient has overly tight control.     MEDICATIONS  (STANDING):  aspirin enteric coated 81 milliGRAM(s) Oral daily  atorvastatin 20 milliGRAM(s) Oral at bedtime  cinacalcet 60 milliGRAM(s) Oral daily  clopidogrel Tablet 75 milliGRAM(s) Oral daily  dextrose 5%. 1000 milliLiter(s) (50 mL/Hr) IV Continuous <Continuous>  dextrose 50% Injectable 12.5 Gram(s) IV Push once  dextrose 50% Injectable 25 Gram(s) IV Push once  DULoxetine 60 milliGRAM(s) Oral daily  heparin  Injectable 5000 Unit(s) SubCutaneous every 8 hours  insulin glargine Injectable (LANTUS) 5 Unit(s) SubCutaneous at bedtime  insulin lispro (HumaLOG) corrective regimen sliding scale   SubCutaneous three times a day before meals  insulin lispro (HumaLOG) corrective regimen sliding scale   SubCutaneous at bedtime  insulin lispro Injectable (HumaLOG) 3 Unit(s) SubCutaneous three times a day with meals  metoprolol succinate ER 50 milliGRAM(s) Oral daily  sevelamer carbonate 800 milliGRAM(s) Oral three times a day with meals    MEDICATIONS  (PRN):  dextrose 40% Gel 15 Gram(s) Oral once PRN Blood Glucose LESS THAN 70 milliGRAM(s)/deciliter  glucagon  Injectable 1 milliGRAM(s) IntraMuscular once PRN Glucose LESS THAN 70 milligrams/deciliter  oxyCODONE    5 mG/acetaminophen 325 mG 1 Tablet(s) Oral every 4 hours PRN Moderate Pain (4 - 6)      Allergies    No Known Allergies    Intolerances      Review of Systems:  Constitutional: No fever  Eyes: No blurry vision  Neuro: No tremors  HEENT: No pain  Cardiovascular: No chest pain, palpitations  Respiratory: No SOB, no cough  GI: No nausea, vomiting, abdominal pain  : No dysuria  Skin: no rash  Psych: no depression  Endocrine: no polyuria, polydipsia  Hem/lymph: no swelling  Osteoporosis: no fractures    ALL OTHER SYSTEMS REVIEWED AND NEGATIVE        PHYSICAL EXAM:  VITALS: T(C): 36.6 (03-31-19 @ 12:50)  T(F): 97.9 (03-31-19 @ 12:50), Max: 98.3 (03-30-19 @ 21:04)  HR: 81 (03-31-19 @ 12:50) (74 - 82)  BP: 149/73 (03-31-19 @ 12:50) (139/76 - 169/81)  RR:  (17 - 18)  SpO2:  (97% - 100%)  Wt(kg): --  GENERAL: NAD, well-groomed, well-developed  EYES: No proptosis, no lid lag, anicteric  HEENT:  Atraumatic, Normocephalic, moist mucous membranes  THYROID: Normal size, no palpable nodules  RESPIRATORY: Clear to auscultation bilaterally; No rales, rhonchi, wheezing, or rubs  CARDIOVASCULAR: Regular rate and rhythm; No murmurs; no peripheral edema  GI: Soft, nontender, non distended, normal bowel sounds  SKIN: Dry, intact, No rashes or lesions  MUSCULOSKELETAL: Full range of motion, normal strength  NEURO: sensation intact, extraocular movements intact, no tremor, normal reflexes  PSYCH: Alert and oriented x 3, normal affect, normal mood  CUSHING'S SIGNS: no striae    POCT Blood Glucose.: 87 mg/dL (03-31-19 @ 13:11)  POCT Blood Glucose.: 106 mg/dL (03-31-19 @ 09:18)  POCT Blood Glucose.: 83 mg/dL (03-30-19 @ 22:22)  POCT Blood Glucose.: 77 mg/dL (03-30-19 @ 18:05)  POCT Blood Glucose.: 130 mg/dL (03-30-19 @ 12:57)  POCT Blood Glucose.: 138 mg/dL (03-30-19 @ 09:00)  POCT Blood Glucose.: 188 mg/dL (03-29-19 @ 21:28)  POCT Blood Glucose.: 133 mg/dL (03-29-19 @ 18:40)  POCT Blood Glucose.: 71 mg/dL (03-29-19 @ 18:23)  POCT Blood Glucose.: 66 mg/dL (03-29-19 @ 18:20)  POCT Blood Glucose.: 105 mg/dL (03-29-19 @ 12:43)  POCT Blood Glucose.: 125 mg/dL (03-29-19 @ 08:03)  POCT Blood Glucose.: 99 mg/dL (03-28-19 @ 21:49)  POCT Blood Glucose.: 95 mg/dL (03-28-19 @ 18:08)      03-31    135  |  93<L>  |  5<L>  ----------------------------<  120<H>  4.1   |  28  |  3.05<H>    EGFR if : 18<L>  EGFR if non : 16<L>    Ca    8.0<L>      03-31  Phos  3.6     03-29            Thyroid Function Tests:      Hemoglobin A1C, Whole Blood: 7.9 % <H> [4.0 - 5.6] (02-27-19 @ 11:08)  Hemoglobin A1C, Whole Blood: 7.6 % <H> [4.0 - 5.6] (02-13-19 @ 08:42)
[facility]   [phone]  DIALYSIS NOTE  Chief Complaint: ESRD/Ongoing hemodialysis requirement.    24 hour events/subjective:      still with fatigue but sob is better       ALLERGIES & MEDICATIONS  --------------------------------------------------------------------------------  Allergies    No Known Allergies    Intolerances      Standing Inpatient Medications  aspirin enteric coated 81 milliGRAM(s) Oral daily  atorvastatin 20 milliGRAM(s) Oral at bedtime  cinacalcet 60 milliGRAM(s) Oral daily  clopidogrel Tablet 75 milliGRAM(s) Oral daily  dextrose 5%. 1000 milliLiter(s) IV Continuous <Continuous>  dextrose 50% Injectable 12.5 Gram(s) IV Push once  dextrose 50% Injectable 25 Gram(s) IV Push once  DULoxetine 60 milliGRAM(s) Oral daily  heparin  Injectable 5000 Unit(s) SubCutaneous every 8 hours  insulin glargine Injectable (LANTUS) 5 Unit(s) SubCutaneous at bedtime  insulin lispro (HumaLOG) corrective regimen sliding scale   SubCutaneous three times a day before meals  insulin lispro (HumaLOG) corrective regimen sliding scale   SubCutaneous at bedtime  insulin lispro Injectable (HumaLOG) 3 Unit(s) SubCutaneous three times a day with meals  metoprolol succinate ER 50 milliGRAM(s) Oral daily  sevelamer carbonate 800 milliGRAM(s) Oral three times a day with meals    PRN Inpatient Medications  dextrose 40% Gel 15 Gram(s) Oral once PRN  glucagon  Injectable 1 milliGRAM(s) IntraMuscular once PRN  oxyCODONE    5 mG/acetaminophen 325 mG 1 Tablet(s) Oral every 4 hours PRN      REVIEW OF SYSTEMS  --------------------------------------------------------------------------------  no itching or rash  no fever or chill  no cp or palp   no sob or cough   no N/V/D/ no abd pain   ext  + edema        VITALS/PHYSICAL EXAM  --------------------------------------------------------------------------------  T(C): 36.6 (03-29-19 @ 17:55), Max: 36.9 (03-28-19 @ 20:46)  HR: 75 (03-29-19 @ 17:55) (68 - 78)  BP: 163/81 (03-29-19 @ 17:55) (128/72 - 163/81)  RR: 18 (03-29-19 @ 17:55) (17 - 18)  SpO2: 99% (03-29-19 @ 17:55) (94% - 99%)  Wt(kg): --    Weight (kg): 54.3 (03-27-19 @ 20:02)      03-28-19 @ 07:01  -  03-29-19 @ 07:00  --------------------------------------------------------  IN: 660 mL / OUT: 0 mL / NET: 660 mL    03-29-19 @ 07:01  -  03-29-19 @ 18:58  --------------------------------------------------------  IN: 240 mL / OUT: 0 mL / NET: 240 mL      Physical Exam:  		  Gen: chronically ill appearing F   	+  jvd ,   	Pulm: decrease bs no rales or ornchi   	CV: RRR, S1S2; no rub  	Abd: +BS, soft, nontender/nondistended  	: No suprapubic tenderness  	UE: Warm, no cyanosis  no clubbing,  no edema; no asterixis  	LE: Warm, no cyanosis  no clubbing, 2+ LLE > RLE edema  	Neuro: alert and oriented. speech coherent   			      LABS/STUDIES  --------------------------------------------------------------------------------              10.2   3.5   >-----------<  197      [03-29-19 @ 15:57]              30.3     138  |  96  |  15  ----------------------------<  79      [03-29-19 @ 15:57]  4.0   |  28  |  4.85        Ca     7.8     [03-29-19 @ 15:57]      Mg     2.0     [03-28-19 @ 06:37]      Phos  3.6     [03-29-19 @ 15:57]                    imp/suggest: ESRD      Hemodialysis Prescription:  	Access:  	Dialyzer: revaclear   	Blood Flow (mL/Min): 400  	Dialysate Flow (mL/Min): 600  	Target UF (Liters):  	Treatment Time:  	Potassium:   	Calcium: 2.5  	  YOLANDA    Vitamin D     continue with hd   see hd flow sheet
DIABETES FOLLOW UP NOTE: Saw pt earlier today  INTERVAL HX: 62 yo woman with uncontrolled type 1 diabetes (well known from prior admissions) w/ESRD on HD, neuropathy, CAD, CVA, CHF and multiple cardiac stents. Presented with CP/SOB found to have NSTEMI, hyperkalemia, and hypotension per primary team. Pt reports feeling better but tired. Had HD yesterday.  Denies any SOB/CP. Tolerating POs with glycemic control still tightly control with BG yesterday afternoon and hs <100s. No further hypoglycemia.  Pt states she eats well but noted breakfast tray untouched around 10am today. Pt started to eat during visit.     Review of Systems:  General: As above  Cardiovascular: No chest pain, palpitations. Positive LE edema  Respiratory: No SOB, no cough  GI: No nausea, vomiting, abdominal pain  Endocrine: no polyuria, polydipsia or S&Sx of hypoglycemia    Allergies    No Known Allergies    Intolerances      MEDICATIONS:  atorvastatin 20 milliGRAM(s) Oral at bedtime  insulin glargine Injectable (LANTUS) 4 Unit(s) SubCutaneous at bedtime  insulin lispro (HumaLOG) corrective regimen sliding scale   SubCutaneous three times a day before meals  insulin lispro (HumaLOG) corrective regimen sliding scale   SubCutaneous at bedtime  insulin lispro Injectable (HumaLOG) 2 Unit(s) SubCutaneous three times a day with meals      PHYSICAL EXAM:  Vital Signs Last 24 Hrs  T(C): 36.7 (04-02-19 @ 11:41), Max: 37.3 (04-01-19 @ 20:57)  T(F): 98 (04-02-19 @ 11:41), Max: 99.1 (04-01-19 @ 20:57)  HR: 77 (04-02-19 @ 11:41) (77 - 84)  BP: 152/76 (04-02-19 @ 11:41) (148/74 - 156/87)  BP(mean): --  RR: 18 (04-02-19 @ 11:41) (18 - 18)  SpO2: 99% (04-02-19 @ 11:41) (97% - 99%)  GENERAL: Female laying in bed in NAD. Sleepy at time of visit  Abdomen: Soft, nontender, non distended  Extremities: Warm, trace edema in LEs. LEs tender at touch  NEURO: A&O X3    LABS:  POCT Blood Glucose.: 248 mg/dL (04-02-19 @ 12:50)  POCT Blood Glucose.: 114 mg/dL (04-02-19 @ 08:56)  POCT Blood Glucose.: 138 mg/dL (04-01-19 @ 23:21)  POCT Blood Glucose.: 84 mg/dL (04-01-19 @ 22:01)  POCT Blood Glucose.: 94 mg/dL (04-01-19 @ 17:58)  POCT Blood Glucose.: 103 mg/dL (04-01-19 @ 14:03)  POCT Blood Glucose.: 122 mg/dL (04-01-19 @ 12:45)  POCT Blood Glucose.: 166 mg/dL (04-01-19 @ 08:50)  POCT Blood Glucose.: 198 mg/dL (03-31-19 @ 22:07)  POCT Blood Glucose.: 168 mg/dL (03-31-19 @ 19:46)  POCT Blood Glucose.: 114 mg/dL (03-31-19 @ 19:19)  POCT Blood Glucose.: 89 mg/dL (03-31-19 @ 19:00)  POCT Blood Glucose.: 81 mg/dL (03-31-19 @ 18:43)  POCT Blood Glucose.: 63 mg/dL (03-31-19 @ 18:24)  POCT Blood Glucose.: 63 mg/dL (03-31-19 @ 18:22)                            11.9   4.6   )-----------( 243      ( 02 Apr 2019 06:27 )             34.0     04-02    138  |  95<L>  |  5<L>  ----------------------------<  145<H>  3.8   |  27  |  3.25<H>    Ca    8.2<L>      02 Apr 2019 06:27  Phos  2.5     04-02  Mg     2.2     04-02    Hemoglobin A1C, Whole Blood: 7.9 % <H> [4.0 - 5.6] (02-27-19 @ 11:08)  Hemoglobin A1C, Whole Blood: 7.6 % <H> [4.0 - 5.6] (02-13-19 @ 08:42)
DIABETES FOLLOW UP NOTE: Saw pt earlier today  INTERVAL HX: 62 yo woman with uncontrolled type 1 diabetes (well known from prior admissions) w/ESRD on HD, neuropathy, CAD, CVA, CHF and multiple cardiac stents. Presented with CP/SOB found to have NSTEMI, hyperkalemia, and hypotension per primary team. Pt reports feeling better. Going to HD today.  Denies any SOB/CP. Tolerating POs with glycemic control tight while on present insulin regimen. Hypoglycemic yesterday ac dinner with BG in 60s. Prior to that her BG was 87 ac lunch and pt received Humalog 3 units. Pt states she eats well.     Review of Systems:  General: As above  Cardiovascular: No chest pain, palpitations. Positive LE edema  Respiratory: No SOB, no cough  GI: No nausea, vomiting, abdominal pain  Endocrine: no polyuria, polydipsia or S&Sx of hypoglycemia    Allergies    No Known Allergies    Intolerances      MEDICATIONS:  atorvastatin 20 milliGRAM(s) Oral at bedtime  insulin glargine Injectable (LANTUS) 4 Unit(s) SubCutaneous at bedtime  insulin lispro (HumaLOG) corrective regimen sliding scale   SubCutaneous three times a day before meals  insulin lispro (HumaLOG) corrective regimen sliding scale   SubCutaneous at bedtime  insulin lispro Injectable (HumaLOG) 3 Unit(s) SubCutaneous three times a day with meals    PHYSICAL EXAM:  Vital Signs Last 24 Hrs  T(C): 36.6 (04-01-19 @ 13:10), Max: 36.9 (03-31-19 @ 21:01)  T(F): 97.8 (04-01-19 @ 13:10), Max: 98.4 (03-31-19 @ 21:01)  HR: 70 (04-01-19 @ 13:10) (70 - 79)  BP: 146/84 (04-01-19 @ 13:10) (129/71 - 166/80)  BP(mean): --  RR: 18 (04-01-19 @ 13:10) (18 - 18)  SpO2: 98% (04-01-19 @ 13:10) (95% - 98%)  GENERAL: Female laying in bed  in NAD  Abdomen: Soft, nontender, non distended  Extremities: Warm, trace edema in LEs. LEs tender at touch  NEURO: A&O X3    LABS:  POCT Blood Glucose.: 103 mg/dL (04-01-19 @ 14:03)  POCT Blood Glucose.: 122 mg/dL (04-01-19 @ 12:45)  POCT Blood Glucose.: 166 mg/dL (04-01-19 @ 08:50)  POCT Blood Glucose.: 198 mg/dL (03-31-19 @ 22:07)  POCT Blood Glucose.: 168 mg/dL (03-31-19 @ 19:46)  POCT Blood Glucose.: 114 mg/dL (03-31-19 @ 19:19)  POCT Blood Glucose.: 89 mg/dL (03-31-19 @ 19:00)  POCT Blood Glucose.: 81 mg/dL (03-31-19 @ 18:43)  POCT Blood Glucose.: 63 mg/dL (03-31-19 @ 18:24)  POCT Blood Glucose.: 63 mg/dL (03-31-19 @ 18:22)  POCT Blood Glucose.: 87 mg/dL (03-31-19 @ 13:11)  POCT Blood Glucose.: 106 mg/dL (03-31-19 @ 09:18)  POCT Blood Glucose.: 83 mg/dL (03-30-19 @ 22:22)  POCT Blood Glucose.: 77 mg/dL (03-30-19 @ 18:05)                            9.8    3.8   )-----------( 206      ( 27 Mar 2019 06:37 )             28.8     04-01    136  |  92<L>  |  10  ----------------------------<  187<H>  4.1   |  27  |  4.68<H>    Ca    7.9<L>      01 Apr 2019 07:02    Ca    8.4      03-28  Mg     2.0     03-28  Phos  4.0     03-27      Hemoglobin A1C, Whole Blood: 7.9 % <H> [4.0 - 5.6] (02-27-19 @ 11:08)  Hemoglobin A1C, Whole Blood: 7.6 % <H> [4.0 - 5.6] (02-13-19 @ 08:42)
DIABETES FOLLOW UP NOTE: Saw pt earlier today  INTERVAL HX: 64 yo woman with uncontrolled type 1 diabetes (well known from prior admissions) w/ESRD on HD, neuropathy, CAD, CVA, CHF and multiple cardiac stents. Presented with CP/SOB found to have NSTEMI, hyperkalemia, and hypotension per primary team. Pt reports feeling better but tired. Saw pt while on HD. Denies any SOB/CP. Tolerating POs with improved intake as per pt's report. Now BG >200s most of the time. No further hypoglycemia.     Review of Systems:  General: As above  Cardiovascular: No chest pain, palpitations. Positive LE edema  Respiratory: No SOB, no cough  GI: No nausea, vomiting, abdominal pain  Endocrine: no polyuria, polydipsia or S&Sx of hypoglycemia    Allergies    No Known Allergies    Intolerances      MEDICATIONS:  atorvastatin 20 milliGRAM(s) Oral at bedtime  insulin lispro (HumaLOG) corrective regimen sliding scale   SubCutaneous three times a day before meals  insulin lispro (HumaLOG) corrective regimen sliding scale   SubCutaneous at bedtime  insulin lispro Injectable (HumaLOG) 2 Unit(s) SubCutaneous three times a day with meals  insulin detemir injectable (LEVEMIR) 4 Unit(s) SubCutaneous at bedtime          PHYSICAL EXAM:  Vital Signs Last 24 Hrs  T(C): 36.5 (04-03-19 @ 12:58), Max: 36.6 (04-02-19 @ 21:03)  T(F): 97.7 (04-03-19 @ 12:58), Max: 97.9 (04-02-19 @ 21:03)  HR: 85 (04-03-19 @ 12:58) (71 - 85)  BP: 147/76 (04-03-19 @ 12:58) (130/81 - 159/87)  BP(mean): --  RR: 18 (04-03-19 @ 12:58) (18 - 18)  SpO2: 99% (04-03-19 @ 12:58) (96% - 100%)  GENERAL: Female laying in bed in NAD having HD.   Abdomen: Soft, nontender, non distended  Extremities: Warm, trace edema in LEs. LEs tender at touch  NEURO: A&O X3    LABS:  POCT Blood Glucose.: 206 mg/dL (04-03-19 @ 14:25)  POCT Blood Glucose.: 132 mg/dL (04-03-19 @ 13:01)  POCT Blood Glucose.: 188 mg/dL (04-03-19 @ 08:05)  POCT Blood Glucose.: 206 mg/dL (04-02-19 @ 21:34)  POCT Blood Glucose.: 209 mg/dL (04-02-19 @ 17:54)  POCT Blood Glucose.: 248 mg/dL (04-02-19 @ 12:50)  POCT Blood Glucose.: 114 mg/dL (04-02-19 @ 08:56)  POCT Blood Glucose.: 138 mg/dL (04-01-19 @ 23:21)  POCT Blood Glucose.: 84 mg/dL (04-01-19 @ 22:01)  POCT Blood Glucose.: 94 mg/dL (04-01-19 @ 17:58)                          11.0   3.7   )-----------( 219      ( 03 Apr 2019 06:48 )             34.9     04-03    137  |  94<L>  |  18  ----------------------------<  252<H>  4.1   |  26  |  4.94<H>    Ca    7.7<L>      03 Apr 2019 06:47  Phos  2.5     04-02  Mg     2.2     04-02    Hemoglobin A1C, Whole Blood: 7.9 % <H> [4.0 - 5.6] (02-27-19 @ 11:08)  Hemoglobin A1C, Whole Blood: 7.6 % <H> [4.0 - 5.6] (02-13-19 @ 08:42)
DIABETES FOLLOW UP NOTE: Saw pt earlier today  INTERVAL HX: 64 yo woman with uncontrolled type 1 diabetes (well known from prior admissions) w/ESRD on HD, neuropathy, CAD, CVA, CHF and multiple cardiac stents. Presented with CP/SOB found to have NSTEMI, hyperkalemia, and hypotension per primary team. Pt reports feeling better. Denies any SOB/CP. Tolerating POs and glycemic control tight while on present insulin regimen. BG 90s to low 100s. No hypoglycemia.    Review of Systems:  General: As above  Cardiovascular: No chest pain, palpitations. Positive LE edema  Respiratory: No SOB, no cough  GI: No nausea, vomiting, abdominal pain  Endocrine: no polyuria, polydipsia or S&Sx of hypoglycemia    Allergies    No Known Allergies    Intolerances      MEDICATIONS:  atorvastatin 20 milliGRAM(s) Oral at bedtime  insulin glargine Injectable (LANTUS) 7 Unit(s) SubCutaneous at bedtime  insulin lispro (HumaLOG) corrective regimen sliding scale   SubCutaneous three times a day before meals  insulin lispro (HumaLOG) corrective regimen sliding scale   SubCutaneous at bedtime  insulin lispro Injectable (HumaLOG) 3 Unit(s) SubCutaneous three times a day with meals    PHYSICAL EXAM:  VITALS: T(C): 36.8 (03-29-19 @ 12:50)  T(F): 98.3 (03-29-19 @ 12:50), Max: 98.4 (03-28-19 @ 20:46)  HR: 74 (03-29-19 @ 12:50) (64 - 74)  BP: 148/82 (03-29-19 @ 12:50) (128/72 - 159/84)  RR:  (17 - 18)  SpO2:  (94% - 100%)  Wt(kg): --  GENERAL: Female laying in bed  in NAD  Abdomen: Soft, nontender, non distended  Extremities: Warm, 1+ edema in LEs L>R. LEs tender at touch  NEURO: A&O X3    LABS:  POCT Blood Glucose.: 105 mg/dL (03-29-19 @ 12:43)  POCT Blood Glucose.: 125 mg/dL (03-29-19 @ 08:03)  POCT Blood Glucose.: 99 mg/dL (03-28-19 @ 21:49)  POCT Blood Glucose.: 95 mg/dL (03-28-19 @ 18:08)  POCT Blood Glucose.: 123 mg/dL (03-28-19 @ 12:55)  POCT Blood Glucose.: 139 mg/dL (03-28-19 @ 09:30)  POCT Blood Glucose.: 322 mg/dL (03-27-19 @ 21:41)  POCT Blood Glucose.: 195 mg/dL (03-27-19 @ 17:57)  POCT Blood Glucose.: 94 mg/dL (03-27-19 @ 10:54)  POCT Blood Glucose.: 106 mg/dL (03-27-19 @ 08:29)  POCT Blood Glucose.: 111 mg/dL (03-26-19 @ 22:11)  POCT Blood Glucose.: 86 mg/dL (03-26-19 @ 18:58)                            9.8    3.8   )-----------( 206      ( 27 Mar 2019 06:37 )             28.8       03-28    138  |  97  |  10  ----------------------------<  183<H>  4.4   |  27  |  3.10<H>    EGFR if : 18<L>  EGFR if non : 15<L>    Ca    8.4      03-28  Mg     2.0     03-28  Phos  4.0     03-27      Hemoglobin A1C, Whole Blood: 7.9 % <H> [4.0 - 5.6] (02-27-19 @ 11:08)  Hemoglobin A1C, Whole Blood: 7.6 % <H> [4.0 - 5.6] (02-13-19 @ 08:42)
DIABETES FOLLOW UP NOTE: Saw pt earlier today  INTERVAL HX: 64 yo woman with uncontrolled type 1 diabetes (well known from prior admissions) w/ESRD on HD, neuropathy, CAD, CVA, CHF and multiple cardiac stents. Presented with CP/SOB found to have NSTEMI, hyperkalemia, and hypotension per primary team. Pt reports feeling better. States she was very hungry last night and was going to the unit pantry for peanut butter and regular jelly sandwiches. She states she ate "a lot" last night (pt doesn't want to elaborate how many she had). Noted BG >500s last night coming down to 300s today. Pt reports she is feeling better so she is eating more. Denies any SOB/CP. No hypoglycemia.  Pt with MVD disease but per primary team she is not a surgical candidate and will be managed medically.      Review of Systems:  General: As above  Cardiovascular: No chest pain, palpitations. Positive LE edema  Respiratory: No SOB, no cough  GI: No nausea, vomiting, abdominal pain  Endocrine: no polyuria, polydipsia or S&Sx of hypoglycemia.     Allergies    No Known Allergies    Intolerances      MEDICATIONS:  atorvastatin 20 milliGRAM(s) Oral at bedtime  insulin lispro (HumaLOG) corrective regimen sliding scale   SubCutaneous three times a day before meals  insulin lispro (HumaLOG) corrective regimen sliding scale   SubCutaneous at bedtime  insulin lispro Injectable (HumaLOG) 3 Unit(s) SubCutaneous three times a day with meals  insulin detemir injectable (LEVEMIR) 4 Unit(s) SubCutaneous at bedtime      PHYSICAL EXAM:  Vital Signs Last 24 Hrs  T(C): 36.7 (04-04-19 @ 12:13), Max: 36.8 (04-04-19 @ 08:47)  T(F): 98 (04-04-19 @ 12:13), Max: 98.2 (04-04-19 @ 08:47)  HR: 82 (04-04-19 @ 12:13) (74 - 85)  BP: 143/77 (04-04-19 @ 12:13) (130/81 - 143/77)  BP(mean): --  RR: 18 (04-04-19 @ 12:13) (17 - 18)  SpO2: 100% (04-04-19 @ 12:13) (96% - 100%)  GENERAL: Female laying in bed in NAD.   Abdomen: Soft, nontender, non distended  Extremities: Warm, trace edema in LEs.   NEURO: A&O X3    LABS:  POCT Blood Glucose.: 303 mg/dL (04-04-19 @ 12:55)  POCT Blood Glucose.: 262 mg/dL (04-04-19 @ 11:40)  POCT Blood Glucose.: 325 mg/dL (04-04-19 @ 09:15)  POCT Blood Glucose.: 434 mg/dL (04-03-19 @ 22:11)  POCT Blood Glucose.: 565 mg/dL (04-03-19 @ 22:07)  POCT Blood Glucose.: 213 mg/dL (04-03-19 @ 18:09)  POCT Blood Glucose.: 206 mg/dL (04-03-19 @ 14:25)  POCT Blood Glucose.: 132 mg/dL (04-03-19 @ 13:01)  POCT Blood Glucose.: 188 mg/dL (04-03-19 @ 08:05)  POCT Blood Glucose.: 206 mg/dL (04-02-19 @ 21:34)  POCT Blood Glucose.: 209 mg/dL (04-02-19 @ 17:54)                          12.3   4.1   )-----------( 263      ( 04 Apr 2019 06:26 )             40.1     04-04    133<L>  |  91<L>  |  16  ----------------------------<  429<H>  4.0   |  26  |  3.65<H>    Ca    8.0<L>      04 Apr 2019 06:26       Hemoglobin A1C, Whole Blood: 7.9 % <H> [4.0 - 5.6] (02-27-19 @ 11:08)  Hemoglobin A1C, Whole Blood: 7.6 % <H> [4.0 - 5.6] (02-13-19 @ 08:42)
[facility]   [phone]  DIALYSIS NOTE  Chief Complaint: ESRD/Ongoing hemodialysis requirement. seen on hd    24 hour events/subjective:    no new c/o         ALLERGIES & MEDICATIONS  --------------------------------------------------------------------------------  Allergies    No Known Allergies    Intolerances      Standing Inpatient Medications  aspirin enteric coated 81 milliGRAM(s) Oral daily  atorvastatin 20 milliGRAM(s) Oral at bedtime  clopidogrel Tablet 75 milliGRAM(s) Oral daily  dextrose 5%. 1000 milliLiter(s) IV Continuous <Continuous>  dextrose 50% Injectable 12.5 Gram(s) IV Push once  dextrose 50% Injectable 25 Gram(s) IV Push once  DULoxetine 60 milliGRAM(s) Oral daily  heparin  Injectable 5000 Unit(s) SubCutaneous every 8 hours  insulin glargine Injectable (LANTUS) 4 Unit(s) SubCutaneous at bedtime  insulin lispro (HumaLOG) corrective regimen sliding scale   SubCutaneous three times a day before meals  insulin lispro (HumaLOG) corrective regimen sliding scale   SubCutaneous at bedtime  insulin lispro Injectable (HumaLOG) 2 Unit(s) SubCutaneous three times a day with meals  metoprolol succinate ER 50 milliGRAM(s) Oral daily    PRN Inpatient Medications  dextrose 40% Gel 15 Gram(s) Oral once PRN  glucagon  Injectable 1 milliGRAM(s) IntraMuscular once PRN  oxyCODONE    5 mG/acetaminophen 325 mG 1 Tablet(s) Oral every 4 hours PRN      REVIEW OF SYSTEMS  --------------------------------------------------------------------------------  no itching or rash  no fever or chill  no cp or palp   no sob or cough   no N/V/D/ no abd pain   ext no edema no pain         VITALS/PHYSICAL EXAM  --------------------------------------------------------------------------------  T(C): 36.7 (04-01-19 @ 16:40), Max: 36.8 (04-01-19 @ 04:12)  HR: 78 (04-01-19 @ 16:40) (70 - 79)  BP: 148/74 (04-01-19 @ 16:40) (129/71 - 166/80)  RR: 18 (04-01-19 @ 16:40) (18 - 18)  SpO2: 99% (04-01-19 @ 16:40) (95% - 99%)  Wt(kg): --        03-31-19 @ 07:01  -  04-01-19 @ 07:00  --------------------------------------------------------  IN: 900 mL / OUT: 0 mL / NET: 900 mL    04-01-19 @ 07:01  -  04-01-19 @ 21:09  --------------------------------------------------------  IN: 1080 mL / OUT: 2900 mL / NET: -1820 mL      Physical Exam:  		  Gen: chronically ill appearing F   	-   jvd ,   	Pulm: decrease bs no rales or ornchi   	CV: RRR, S1S2; no rub  	Abd: +BS, soft, nontender/nondistended  	: No suprapubic tenderness  	UE: Warm, no cyanosis  no clubbing,  no edema; no asterixis  	LE: Warm, no cyanosis  no clubbing, no edema     	  LABS/STUDIES  --------------------------------------------------------------------------------    136  |  92  |  10  ----------------------------<  187      [04-01-19 @ 07:02]  4.1   |  27  |  4.68        Ca     7.9     [04-01-19 @ 07:02]                    imp/suggest: ESRD      Hemodialysis Prescription:  	Access:  	Dialyzer: revaclear   	Blood Flow (mL/Min): 400  	Dialysate Flow (mL/Min): 600  	Target UF (Liters):  	Treatment Time:  	Potassium:   	Calcium: 2.5  	  YOLANDA    Vitamin D     continue with hd   see hd flow sheet
[facility]   [phone]  RENAL FOLLOW UP NOTE  --------------------------------------------------------------------------------  Chief Complaint:    24 hour events/subjective:    seen earlier. sleeping awoken states breathing is better than yesterday   still feels very fatigue    PAST HISTORY  --------------------------------------------------------------------------------  No significant changes to PMH, PSH, FHx, SHx, unless otherwise noted    ALLERGIES & MEDICATIONS  --------------------------------------------------------------------------------  Allergies    No Known Allergies    Intolerances      Standing Inpatient Medications  aspirin enteric coated 81 milliGRAM(s) Oral daily  atorvastatin 20 milliGRAM(s) Oral at bedtime  cinacalcet 60 milliGRAM(s) Oral daily  clopidogrel Tablet 75 milliGRAM(s) Oral daily  dextrose 5%. 1000 milliLiter(s) IV Continuous <Continuous>  dextrose 50% Injectable 12.5 Gram(s) IV Push once  dextrose 50% Injectable 25 Gram(s) IV Push once  DULoxetine 60 milliGRAM(s) Oral daily  heparin  Injectable 5000 Unit(s) SubCutaneous every 8 hours  insulin glargine Injectable (LANTUS) 7 Unit(s) SubCutaneous at bedtime  insulin lispro (HumaLOG) corrective regimen sliding scale   SubCutaneous three times a day before meals  insulin lispro (HumaLOG) corrective regimen sliding scale   SubCutaneous at bedtime  insulin lispro Injectable (HumaLOG) 3 Unit(s) SubCutaneous three times a day with meals  sevelamer carbonate 800 milliGRAM(s) Oral three times a day with meals    PRN Inpatient Medications  dextrose 40% Gel 15 Gram(s) Oral once PRN  glucagon  Injectable 1 milliGRAM(s) IntraMuscular once PRN  oxyCODONE    5 mG/acetaminophen 325 mG 1 Tablet(s) Oral every 4 hours PRN      REVIEW OF SYSTEMS  --------------------------------------------------------------------------------    Gen: denies  fevers/chills,  CVS: denies chest pain/palpitations  Resp: denies SOB/Cough  GI: Denies N/V/Abd pain  : Denies dysuria    All other systems were reviewed and are negative, except as noted.    VITALS/PHYSICAL EXAM  --------------------------------------------------------------------------------  T(C): 36.7 (03-28-19 @ 15:33), Max: 36.8 (03-28-19 @ 11:41)  HR: 64 (03-28-19 @ 15:33) (64 - 89)  BP: 159/84 (03-28-19 @ 15:33) (151/80 - 166/89)  RR: 18 (03-28-19 @ 15:33) (17 - 18)  SpO2: 100% (03-28-19 @ 15:33) (94% - 100%)  Wt(kg): --  Height (cm): 162.56 (03-26-19 @ 20:23)  Weight (kg): 54.3 (03-27-19 @ 20:02)  BMI (kg/m2): 20.5 (03-27-19 @ 20:02)  BSA (m2): 1.57 (03-27-19 @ 20:02)      03-27-19 @ 07:01  -  03-28-19 @ 07:00  --------------------------------------------------------  IN: 1260 mL / OUT: 3500 mL / NET: -2240 mL    03-28-19 @ 07:01  -  03-28-19 @ 19:36  --------------------------------------------------------  IN: 420 mL / OUT: 0 mL / NET: 420 mL      Physical Exam:  	    Gen: chronically ill appearing F   	+  jvd ,   	Pulm: decrease bs no rales or ornchi   	CV: RRR, S1S2; no rub  	Abd: +BS, soft, nontender/nondistended  	: No suprapubic tenderness  	UE: Warm, no cyanosis  no clubbing,  no edema; no asterixis  	LE: Warm, no cyanosis  no clubbing, 2+ LLE > RLE edema  	Neuro: alert and oriented. speech coherent   		  	  LABS/STUDIES  --------------------------------------------------------------------------------              9.8    3.8   >-----------<  206      [03-27-19 @ 06:37]              28.8     138  |  97  |  10  ----------------------------<  183      [03-28-19 @ 06:37]  4.4   |  27  |  3.10        Ca     8.4     [03-28-19 @ 06:37]      Mg     2.0     [03-28-19 @ 06:37]      Phos  4.0     [03-27-19 @ 06:37]            Creatinine Trend:  SCr 3.10 [03-28 @ 06:37]  SCr 3.69 [03-27 @ 06:37]  SCr 3.13 [03-26 @ 21:13]  SCr 6.04 [03-26 @ 00:08]  SCr 5.83 [03-25 @ 22:30]                  PTH -- (Ca 7.3)      [02-22-19 @ 02:49]   59  HbA1c 7.9      [02-27-19 @ 11:08]  TSH 0.71      [11-17-18 @ 08:25]  Lipid: chol 113, TG 86, HDL 59, LDL 37      [04-30-18 @ 06:44]
[facility]   [phone]  DIALYSIS NOTE  Chief Complaint: ESRD/Ongoing hemodialysis requirement. seen on hd    24 hour events/subjective:    + SOB         ALLERGIES & MEDICATIONS  --------------------------------------------------------------------------------  Allergies    No Known Allergies    Intolerances      Standing Inpatient Medications  aspirin enteric coated 81 milliGRAM(s) Oral daily  atorvastatin 20 milliGRAM(s) Oral at bedtime  cinacalcet 60 milliGRAM(s) Oral daily  clopidogrel Tablet 75 milliGRAM(s) Oral daily  dextrose 5%. 1000 milliLiter(s) IV Continuous <Continuous>  dextrose 50% Injectable 12.5 Gram(s) IV Push once  dextrose 50% Injectable 25 Gram(s) IV Push once  DULoxetine 60 milliGRAM(s) Oral daily  heparin  Injectable 5000 Unit(s) SubCutaneous every 8 hours  insulin glargine Injectable (LANTUS) 5 Unit(s) SubCutaneous at bedtime  insulin lispro (HumaLOG) corrective regimen sliding scale   SubCutaneous three times a day before meals  insulin lispro (HumaLOG) corrective regimen sliding scale   SubCutaneous at bedtime  metoprolol tartrate 25 milliGRAM(s) Oral two times a day  sevelamer carbonate 800 milliGRAM(s) Oral three times a day with meals    PRN Inpatient Medications  dextrose 40% Gel 15 Gram(s) Oral once PRN  glucagon  Injectable 1 milliGRAM(s) IntraMuscular once PRN  oxyCODONE    IR 5 milliGRAM(s) Oral every 4 hours PRN      REVIEW OF SYSTEMS  --------------------------------------------------------------------------------  no itching or rash  no fever or chill  no cp or palp   +  sob no  cough   no N/V/D/ no abd pain   ext + edema no pain         VITALS/PHYSICAL EXAM  --------------------------------------------------------------------------------  T(C): 36.4 (03-27-19 @ 09:20), Max: 36.9 (03-26-19 @ 19:27)  HR: 80 (03-27-19 @ 09:20) (75 - 83)  BP: 139/80 (03-27-19 @ 09:20) (130/86 - 158/75)  RR: 18 (03-27-19 @ 09:20) (16 - 20)  SpO2: 95% (03-27-19 @ 09:20) (95% - 100%)  Wt(kg): --  Height (cm): 162.56 (03-26-19 @ 20:23)      03-26-19 @ 07:01  -  03-27-19 @ 07:00  --------------------------------------------------------  IN: 1100 mL / OUT: 2500 mL / NET: -1400 mL      Physical Exam:  		  Gen: chronically ill appearing F   	+  jvd ,   	Pulm: coarse bs b/l diffuse   	CV: RRR, S1S2; no rub  	Abd: +BS, soft, nontender/nondistended  	: No suprapubic tenderness  	UE: Warm, no cyanosis  no clubbing,  no edema; no asterixis  	LE: Warm, no cyanosis  no clubbing, 2+ LLE > RLE edema  	Neuro: alert and oriented. speech coherent   		  	    LABS/STUDIES  --------------------------------------------------------------------------------              9.8    3.8   >-----------<  206      [03-27-19 @ 06:37]              28.8     139  |  96  |  14  ----------------------------<  133      [03-27-19 @ 06:37]  4.8   |  27  |  3.69        Ca     8.6     [03-27-19 @ 06:37]      Mg     2.2     [03-27-19 @ 06:37]      Phos  4.0     [03-27-19 @ 06:37]    TPro  6.7  /  Alb  4.2  /  TBili  0.3  /  DBili  x   /  AST  20  /  ALT  12  /  AlkPhos  69  [03-26-19 @ 00:08]    PT/INR: PT 12.0 , INR 1.05       [03-25-19 @ 22:41]              imp/suggest: ESRD      Hemodialysis Prescription:  	Access:  	Dialyzer: revaclear   	Blood Flow (mL/Min): 400  	Dialysate Flow (mL/Min): 600  	Target UF (Liters):  	Treatment Time:  	Potassium:   	Calcium: 2.5  	  YOLANDA    Vitamin D     continue with hd   see hd flow sheet
[facility]   [phone]  DIALYSIS NOTE  Chief Complaint: ESRD/Ongoing hemodialysis requirement.    24 hour events/subjective:    states breathing is better         ALLERGIES & MEDICATIONS  --------------------------------------------------------------------------------  Allergies    No Known Allergies    Intolerances      Standing Inpatient Medications  aspirin enteric coated 81 milliGRAM(s) Oral daily  atorvastatin 20 milliGRAM(s) Oral at bedtime  cinacalcet 60 milliGRAM(s) Oral daily  clopidogrel Tablet 75 milliGRAM(s) Oral daily  dextrose 5%. 1000 milliLiter(s) IV Continuous <Continuous>  dextrose 50% Injectable 12.5 Gram(s) IV Push once  dextrose 50% Injectable 25 Gram(s) IV Push once  DULoxetine 60 milliGRAM(s) Oral daily  heparin  Injectable 5000 Unit(s) SubCutaneous every 8 hours  insulin glargine Injectable (LANTUS) 5 Unit(s) SubCutaneous at bedtime  insulin lispro (HumaLOG) corrective regimen sliding scale   SubCutaneous three times a day before meals  insulin lispro (HumaLOG) corrective regimen sliding scale   SubCutaneous at bedtime  insulin lispro Injectable (HumaLOG) 3 Unit(s) SubCutaneous three times a day with meals  metoprolol succinate ER 50 milliGRAM(s) Oral daily  sevelamer carbonate 800 milliGRAM(s) Oral three times a day with meals    PRN Inpatient Medications  dextrose 40% Gel 15 Gram(s) Oral once PRN  glucagon  Injectable 1 milliGRAM(s) IntraMuscular once PRN  oxyCODONE    5 mG/acetaminophen 325 mG 1 Tablet(s) Oral every 4 hours PRN      REVIEW OF SYSTEMS  --------------------------------------------------------------------------------  no itching or rash  no fever or chill  no cp or palp   no worsening sob  no  cough   no N/V/D/ no abd pain   ext + edema         VITALS/PHYSICAL EXAM  --------------------------------------------------------------------------------  T(C): 36.6 (03-30-19 @ 14:00), Max: 36.8 (03-29-19 @ 20:51)  HR: 74 (03-30-19 @ 14:00) (73 - 90)  BP: 158/81 (03-30-19 @ 14:00) (150/79 - 163/81)  RR: 19 (03-30-19 @ 14:00) (18 - 19)  SpO2: 96% (03-30-19 @ 14:00) (96% - 99%)  Wt(kg): --        03-29-19 @ 07:01  -  03-30-19 @ 07:00  --------------------------------------------------------  IN: 340 mL / OUT: 2000 mL / NET: -1660 mL    03-30-19 @ 07:01  -  03-30-19 @ 16:30  --------------------------------------------------------  IN: 180 mL / OUT: 0 mL / NET: 180 mL      Physical Exam:  		  Gen: chronically ill appearing F   	+  jvd ,   	Pulm: decrease bs no rales or ornchi   	CV: RRR, S1S2; no rub  	Abd: +BS, soft, nontender/nondistended  	: No suprapubic tenderness  	UE: Warm, no cyanosis  no clubbing,  no edema; no asterixis  	LE: Warm, no cyanosis  no clubbing, 2+ LLE > RLE edema  	Neuro: alert and oriented. speech coherent   				    	    LABS/STUDIES  --------------------------------------------------------------------------------              10.2   3.5   >-----------<  197      [03-29-19 @ 15:57]              30.3     136  |  94  |  9   ----------------------------<  156      [03-30-19 @ 07:18]  4.2   |  25  |  3.90        Ca     7.9     [03-30-19 @ 07:18]      Phos  3.6     [03-29-19 @ 15:57]                    imp/suggest: ESRD      Hemodialysis Prescription:  	Access:  	Dialyzer: revaclear   	Blood Flow (mL/Min): 400  	Dialysate Flow (mL/Min): 600  	Target UF (Liters):  	Treatment Time:  	Potassium:   	Calcium: 2.5  	  YOLANDA    Vitamin D     continue with hd   see hd flow sheet
PULMONARY PROGRESS NOTE    NATHAN MEEKS  MRN-[id number]    Patient is a 63y old  Female who presents with a chief complaint of chest pain, SOB (29 Mar 2019 18:58)      HPI  "I feel fine i want to go home"    ROS:   -neg    MEDICATIONS  (STANDING):  aspirin enteric coated 81 milliGRAM(s) Oral daily  atorvastatin 20 milliGRAM(s) Oral at bedtime  clopidogrel Tablet 75 milliGRAM(s) Oral daily  dextrose 5%. 1000 milliLiter(s) (50 mL/Hr) IV Continuous <Continuous>  dextrose 50% Injectable 12.5 Gram(s) IV Push once  dextrose 50% Injectable 25 Gram(s) IV Push once  DULoxetine 60 milliGRAM(s) Oral daily  heparin  Injectable 5000 Unit(s) SubCutaneous every 8 hours  insulin glargine Injectable (LANTUS) 4 Unit(s) SubCutaneous at bedtime  insulin lispro (HumaLOG) corrective regimen sliding scale   SubCutaneous three times a day before meals  insulin lispro (HumaLOG) corrective regimen sliding scale   SubCutaneous at bedtime  insulin lispro Injectable (HumaLOG) 2 Unit(s) SubCutaneous three times a day with meals  metoprolol succinate ER 50 milliGRAM(s) Oral daily    MEDICATIONS  (PRN):  dextrose 40% Gel 15 Gram(s) Oral once PRN Blood Glucose LESS THAN 70 milliGRAM(s)/deciliter  glucagon  Injectable 1 milliGRAM(s) IntraMuscular once PRN Glucose LESS THAN 70 milligrams/deciliter  oxyCODONE    5 mG/acetaminophen 325 mG 1 Tablet(s) Oral every 4 hours PRN Moderate Pain (4 - 6)        EXAM:  Vital Signs Last 24 Hrs  T(C): 37.2 (02 Apr 2019 04:31), Max: 37.3 (01 Apr 2019 20:57)  T(F): 98.9 (02 Apr 2019 04:31), Max: 99.1 (01 Apr 2019 20:57)  HR: 81 (02 Apr 2019 04:31) (70 - 84)  BP: 156/87 (02 Apr 2019 04:31) (129/71 - 156/87)  BP(mean): --  RR: 18 (02 Apr 2019 04:31) (18 - 18)  SpO2: 97% (02 Apr 2019 04:31) (95% - 99%)    GENERAL: The patient is awake and alert in no apparent distress.       LUNGS: clear        LABS/IMGAING: reviewed                        11.9   4.6   )-----------( 243      ( 02 Apr 2019 06:27 )             34.0   04-02    138  |  95<L>  |  5<L>  ----------------------------<  145<H>  3.8   |  27  |  3.25<H>    Ca    8.2<L>      02 Apr 2019 06:27  Phos  2.5     04-02  Mg     2.2     04-02          < from: CT Chest No Cont (03.26.19 @ 08:05) >  IMPRESSION:   Difficult to delineate bulky mediastinal and bilateral hilar   lymphadenopathy .    Persistent right greater than left interlobular septal thickening   suggestive of asymmetric pulmonary edema.    Interval decrease in number of bilateral groundglass opacities which   likely were infectious.    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: Type 1 diabetes mellitus with other circulatory complication  Other hyperlipidemia: Other hyperlipidemia  Hypertension, unspecified type: Hypertension, unspecified type  Type 1 diabetes mellitus with diabetic peripheral angiopathy without gangrene: Type 1 diabetes mellitus with diabetic peripheral angiopathy without gangrene  Chronic systolic congestive heart failure: Chronic systolic congestive heart failure  ESRD (end stage renal disease): ESRD (end stage renal disease)  Opacity of lung on imaging study: Opacity of lung on imaging study  Other specified hypotension: Other specified hypotension  Hyperkalemia: Hyperkalemia  NSTEMI (non-ST elevated myocardial infarction): NSTEMI (non-ST elevated myocardial infarction)      RECS:  -cont current meds, breathing improved  -fu with  after gina Tate MD  [phone]
PULMONARY PROGRESS NOTE    NATHAN MEEKS  MRN-[id number]    Patient is a 63y old  Female who presents with a chief complaint of chest pain, SOB (29 Mar 2019 18:58)      HPI  still feeling sob but not as bad as previously, denies cough, awaiting HD today.    ROS:   -neg    ACTIVE MEDICATION LIST:  MEDICATIONS  (STANDING):  aspirin enteric coated 81 milliGRAM(s) Oral daily  atorvastatin 20 milliGRAM(s) Oral at bedtime  cinacalcet 60 milliGRAM(s) Oral daily  clopidogrel Tablet 75 milliGRAM(s) Oral daily  dextrose 5%. 1000 milliLiter(s) (50 mL/Hr) IV Continuous <Continuous>  dextrose 50% Injectable 12.5 Gram(s) IV Push once  dextrose 50% Injectable 25 Gram(s) IV Push once  DULoxetine 60 milliGRAM(s) Oral daily  heparin  Injectable 5000 Unit(s) SubCutaneous every 8 hours  insulin glargine Injectable (LANTUS) 5 Unit(s) SubCutaneous at bedtime  insulin lispro (HumaLOG) corrective regimen sliding scale   SubCutaneous three times a day before meals  insulin lispro (HumaLOG) corrective regimen sliding scale   SubCutaneous at bedtime  insulin lispro Injectable (HumaLOG) 3 Unit(s) SubCutaneous three times a day with meals  metoprolol succinate ER 50 milliGRAM(s) Oral daily  sevelamer carbonate 800 milliGRAM(s) Oral three times a day with meals    MEDICATIONS  (PRN):  dextrose 40% Gel 15 Gram(s) Oral once PRN Blood Glucose LESS THAN 70 milliGRAM(s)/deciliter  glucagon  Injectable 1 milliGRAM(s) IntraMuscular once PRN Glucose LESS THAN 70 milligrams/deciliter  oxyCODONE    5 mG/acetaminophen 325 mG 1 Tablet(s) Oral every 4 hours PRN Moderate Pain (4 - 6)      EXAM:  Vital Signs Last 24 Hrs  T(C): 36.8 (30 Mar 2019 04:20), Max: 36.8 (29 Mar 2019 12:50)  T(F): 98.2 (30 Mar 2019 04:20), Max: 98.3 (29 Mar 2019 12:50)  HR: 83 (30 Mar 2019 04:20) (74 - 90)  BP: 150/79 (30 Mar 2019 04:20) (147/80 - 163/81)  BP(mean): --  RR: 18 (30 Mar 2019 04:20) (17 - 18)  SpO2: 96% (30 Mar 2019 04:20) (95% - 99%)    GENERAL: The patient is awake and alert in no apparent distress.       LUNGS: bilat crackles        LABS/IMGAING: reviewed                        10.2   3.5   )-----------( 197      ( 29 Mar 2019 15:57 )             30.3     03-30    136  |  94<L>  |  9   ----------------------------<  156<H>  4.2   |  25  |  3.90<H>    Ca    7.9<L>      30 Mar 2019 07:18  Phos  3.6     03-29    < from: CT Chest No Cont (03.26.19 @ 08:05) >  IMPRESSION:   Difficult to delineate bulky mediastinal and bilateral hilar   lymphadenopathy .    Persistent right greater than left interlobular septal thickening   suggestive of asymmetric pulmonary edema.    Interval decrease in number of bilateral groundglass opacities which   likely were infectious.    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: Type 1 diabetes mellitus with other circulatory complication  Other hyperlipidemia: Other hyperlipidemia  Hypertension, unspecified type: Hypertension, unspecified type  Type 1 diabetes mellitus with diabetic peripheral angiopathy without gangrene: Type 1 diabetes mellitus with diabetic peripheral angiopathy without gangrene  Chronic systolic congestive heart failure: Chronic systolic congestive heart failure  ESRD (end stage renal disease): ESRD (end stage renal disease)  Opacity of lung on imaging study: Opacity of lung on imaging study  Other specified hypotension: Other specified hypotension  Hyperkalemia: Hyperkalemia  NSTEMI (non-ST elevated myocardial infarction): NSTEMI (non-ST elevated myocardial infarction)      RECS:  -fluid removal with HD  -cont current meds  -fu with  after dc        Alem Tate MD  [phone]
Patient is a 63y old  Female who presents with a chief complaint of chest pain, SOB (01 Apr 2019 08:01)      SUBJECTIVE / OVERNIGHT EVENTS: Comfortable without new complaints.   Review of Systems  chest pain no  palpitations no  sob no  nausea no  headache no    MEDICATIONS  (STANDING):  aspirin enteric coated 81 milliGRAM(s) Oral daily  atorvastatin 20 milliGRAM(s) Oral at bedtime  clopidogrel Tablet 75 milliGRAM(s) Oral daily  dextrose 5%. 1000 milliLiter(s) (50 mL/Hr) IV Continuous <Continuous>  dextrose 50% Injectable 12.5 Gram(s) IV Push once  dextrose 50% Injectable 25 Gram(s) IV Push once  DULoxetine 60 milliGRAM(s) Oral daily  heparin  Injectable 5000 Unit(s) SubCutaneous every 8 hours  insulin glargine Injectable (LANTUS) 4 Unit(s) SubCutaneous at bedtime  insulin lispro (HumaLOG) corrective regimen sliding scale   SubCutaneous three times a day before meals  insulin lispro (HumaLOG) corrective regimen sliding scale   SubCutaneous at bedtime  insulin lispro Injectable (HumaLOG) 2 Unit(s) SubCutaneous three times a day with meals  metoprolol succinate ER 50 milliGRAM(s) Oral daily    MEDICATIONS  (PRN):  dextrose 40% Gel 15 Gram(s) Oral once PRN Blood Glucose LESS THAN 70 milliGRAM(s)/deciliter  glucagon  Injectable 1 milliGRAM(s) IntraMuscular once PRN Glucose LESS THAN 70 milligrams/deciliter  oxyCODONE    5 mG/acetaminophen 325 mG 1 Tablet(s) Oral every 4 hours PRN Moderate Pain (4 - 6)      Vital Signs Last 24 Hrs  T(C): 36.6 (01 Apr 2019 13:10), Max: 36.9 (31 Mar 2019 21:01)  T(F): 97.8 (01 Apr 2019 13:10), Max: 98.4 (31 Mar 2019 21:01)  HR: 70 (01 Apr 2019 13:10) (70 - 80)  BP: 146/84 (01 Apr 2019 13:10) (129/71 - 166/80)  BP(mean): --  RR: 18 (01 Apr 2019 13:10) (18 - 18)  SpO2: 98% (01 Apr 2019 13:10) (95% - 99%)    PHYSICAL EXAM:  GENERAL: NAD  HEAD:  Atraumatic, Normocephalic  EYES: EOMI, PERRLA, conjunctiva and sclera clear  NECK: Supple, No JVD  CHEST/LUNG: Clear to auscultation bilaterally; No wheeze  HEART: Regular rate and rhythm; No murmurs, rubs, or gallops  ABDOMEN: Soft, Nontender, Nondistended; Bowel sounds present  EXTREMITIES:  2+ Peripheral Pulses, No clubbing, cyanosis, or edema  PSYCH: AAOx3  NEUROLOGY: non-focal  SKIN: No rashes or lesions    LABS:    04-01    136  |  92<L>  |  10  ----------------------------<  187<H>  4.1   |  27  |  4.68<H>    Ca    7.9<L>      01 Apr 2019 07:02                  RADIOLOGY & ADDITIONAL TESTS:    Imaging Personally Reviewed:    Consultant(s) Notes Reviewed:      Care Discussed with Consultants/Other Providers:
Patient is a 63y old  Female who presents with a chief complaint of chest pain, SOB (02 Apr 2019 14:16)      SUBJECTIVE / OVERNIGHT EVENTS: No new complaints.   Review of Systems  chest pain no  palpitations no  sob no  nausea no  headache no    MEDICATIONS  (STANDING):  aspirin enteric coated 81 milliGRAM(s) Oral daily  atorvastatin 20 milliGRAM(s) Oral at bedtime  clopidogrel Tablet 75 milliGRAM(s) Oral daily  dextrose 5%. 1000 milliLiter(s) (50 mL/Hr) IV Continuous <Continuous>  dextrose 50% Injectable 12.5 Gram(s) IV Push once  dextrose 50% Injectable 25 Gram(s) IV Push once  DULoxetine 60 milliGRAM(s) Oral daily  heparin  Injectable 5000 Unit(s) SubCutaneous every 8 hours  insulin detemir injectable (LEVEMIR) 4 Unit(s) SubCutaneous at bedtime  insulin lispro (HumaLOG) corrective regimen sliding scale   SubCutaneous three times a day before meals  insulin lispro (HumaLOG) corrective regimen sliding scale   SubCutaneous at bedtime  insulin lispro Injectable (HumaLOG) 2 Unit(s) SubCutaneous three times a day with meals  metoprolol succinate ER 50 milliGRAM(s) Oral daily    MEDICATIONS  (PRN):  dextrose 40% Gel 15 Gram(s) Oral once PRN Blood Glucose LESS THAN 70 milliGRAM(s)/deciliter  glucagon  Injectable 1 milliGRAM(s) IntraMuscular once PRN Glucose LESS THAN 70 milligrams/deciliter  oxyCODONE    5 mG/acetaminophen 325 mG 1 Tablet(s) Oral every 4 hours PRN Moderate Pain (4 - 6)      Vital Signs Last 24 Hrs  T(C): 36.7 (02 Apr 2019 11:41), Max: 37.3 (01 Apr 2019 20:57)  T(F): 98 (02 Apr 2019 11:41), Max: 99.1 (01 Apr 2019 20:57)  HR: 77 (02 Apr 2019 11:41) (77 - 84)  BP: 152/76 (02 Apr 2019 11:41) (152/76 - 156/87)  BP(mean): --  RR: 18 (02 Apr 2019 11:41) (18 - 18)  SpO2: 99% (02 Apr 2019 11:41) (97% - 99%)    PHYSICAL EXAM:  GENERAL: NAD  HEAD:  Atraumatic, Normocephalic  EYES: EOMI, PERRLA, conjunctiva and sclera clear  NECK: Supple, No JVD  CHEST/LUNG: Clear to auscultation bilaterally; No wheeze  HEART: Regular rate and rhythm; No murmurs, rubs, or gallops  ABDOMEN: Soft, Nontender, Nondistended; Bowel sounds present  EXTREMITIES:  no edema  PSYCH: AAOx3  NEUROLOGY: non-focal  SKIN: No rashes or lesions    LABS:                        11.9   4.6   )-----------( 243      ( 02 Apr 2019 06:27 )             34.0     04-02    138  |  95<L>  |  5<L>  ----------------------------<  145<H>  3.8   |  27  |  3.25<H>    Ca    8.2<L>      02 Apr 2019 06:27  Phos  2.5     04-02  Mg     2.2     04-02                  RADIOLOGY & ADDITIONAL TESTS:    Imaging Personally Reviewed:    Consultant(s) Notes Reviewed:      Care Discussed with Consultants/Other Providers:
Patient is a 63y old  Female who presents with a chief complaint of chest pain, SOB (03 Apr 2019 19:11)      SUBJECTIVE / OVERNIGHT EVENTS: No new complaints.   Review of Systems  chest pain no  palpitations no  sob no  nausea no  headache no    MEDICATIONS  (STANDING):  aspirin enteric coated 81 milliGRAM(s) Oral daily  atorvastatin 20 milliGRAM(s) Oral at bedtime  clopidogrel Tablet 75 milliGRAM(s) Oral daily  dextrose 5%. 1000 milliLiter(s) (50 mL/Hr) IV Continuous <Continuous>  dextrose 50% Injectable 12.5 Gram(s) IV Push once  dextrose 50% Injectable 25 Gram(s) IV Push once  DULoxetine 60 milliGRAM(s) Oral daily  heparin  Injectable 5000 Unit(s) SubCutaneous every 8 hours  insulin detemir injectable (LEVEMIR) 4 Unit(s) SubCutaneous at bedtime  insulin lispro (HumaLOG) corrective regimen sliding scale   SubCutaneous three times a day before meals  insulin lispro (HumaLOG) corrective regimen sliding scale   SubCutaneous at bedtime  insulin lispro Injectable (HumaLOG) 3 Unit(s) SubCutaneous three times a day with meals  metoprolol succinate ER 50 milliGRAM(s) Oral daily    MEDICATIONS  (PRN):  dextrose 40% Gel 15 Gram(s) Oral once PRN Blood Glucose LESS THAN 70 milliGRAM(s)/deciliter  glucagon  Injectable 1 milliGRAM(s) IntraMuscular once PRN Glucose LESS THAN 70 milligrams/deciliter  oxyCODONE    5 mG/acetaminophen 325 mG 1 Tablet(s) Oral every 4 hours PRN Moderate Pain (4 - 6)      Vital Signs Last 24 Hrs  T(C): 36.5 (03 Apr 2019 12:58), Max: 36.6 (02 Apr 2019 21:03)  T(F): 97.7 (03 Apr 2019 12:58), Max: 97.9 (02 Apr 2019 21:03)  HR: 85 (03 Apr 2019 12:58) (71 - 85)  BP: 147/76 (03 Apr 2019 12:58) (130/81 - 159/87)  BP(mean): --  RR: 18 (03 Apr 2019 12:58) (18 - 18)  SpO2: 99% (03 Apr 2019 12:58) (96% - 100%)    PHYSICAL EXAM:  GENERAL: NAD  HEAD:  Atraumatic, Normocephalic  EYES: EOMI, PERRLA, conjunctiva and sclera clear  NECK: Supple, No JVD  CHEST/LUNG: Clear to auscultation bilaterally; No wheeze  HEART: Regular rate and rhythm; No murmurs, rubs, or gallops  ABDOMEN: Soft, Nontender, Nondistended; Bowel sounds present  EXTREMITIES:  2+ Peripheral Pulses, No clubbing, cyanosis, or edema  PSYCH: Anxious  NEUROLOGY: non-focal  SKIN: No rashes or lesions    LABS:                        11.0   3.7   )-----------( 219      ( 03 Apr 2019 06:48 )             34.9     04-03    137  |  94<L>  |  18  ----------------------------<  252<H>  4.1   |  26  |  4.94<H>    Ca    7.7<L>      03 Apr 2019 06:47  Phos  2.5     04-02  Mg     2.2     04-02      PT/INR - ( 03 Apr 2019 06:48 )   PT: 11.7 sec;   INR: 1.02 ratio         PTT - ( 03 Apr 2019 06:48 )  PTT:30.6 sec            RADIOLOGY & ADDITIONAL TESTS:    Imaging Personally Reviewed:    Consultant(s) Notes Reviewed:      Care Discussed with Consultants/Other Providers:
Patient is a 63y old  Female who presents with a chief complaint of chest pain, SOB (27 Mar 2019 11:47)      SUBJECTIVE / OVERNIGHT EVENTS: feels better  Review of Systems  chest pain no  palpitations no  sob improving   nausea no  headache no    MEDICATIONS  (STANDING):  aspirin enteric coated 81 milliGRAM(s) Oral daily  atorvastatin 20 milliGRAM(s) Oral at bedtime  cinacalcet 60 milliGRAM(s) Oral daily  clopidogrel Tablet 75 milliGRAM(s) Oral daily  dextrose 5%. 1000 milliLiter(s) (50 mL/Hr) IV Continuous <Continuous>  dextrose 50% Injectable 12.5 Gram(s) IV Push once  dextrose 50% Injectable 25 Gram(s) IV Push once  DULoxetine 60 milliGRAM(s) Oral daily  heparin  Injectable 5000 Unit(s) SubCutaneous every 8 hours  insulin glargine Injectable (LANTUS) 5 Unit(s) SubCutaneous at bedtime  insulin lispro (HumaLOG) corrective regimen sliding scale   SubCutaneous three times a day before meals  insulin lispro (HumaLOG) corrective regimen sliding scale   SubCutaneous at bedtime  metoprolol tartrate 25 milliGRAM(s) Oral two times a day  sevelamer carbonate 800 milliGRAM(s) Oral three times a day with meals    MEDICATIONS  (PRN):  dextrose 40% Gel 15 Gram(s) Oral once PRN Blood Glucose LESS THAN 70 milliGRAM(s)/deciliter  glucagon  Injectable 1 milliGRAM(s) IntraMuscular once PRN Glucose LESS THAN 70 milligrams/deciliter  oxyCODONE    IR 5 milliGRAM(s) Oral every 4 hours PRN Moderate Pain (4 - 6)      Vital Signs Last 24 Hrs  T(C): 36.3 (27 Mar 2019 14:40), Max: 36.9 (26 Mar 2019 20:23)  T(F): 97.4 (27 Mar 2019 14:40), Max: 98.4 (26 Mar 2019 20:23)  HR: 82 (27 Mar 2019 17:53) (66 - 82)  BP: 167/77 (27 Mar 2019 17:53) (129/69 - 167/77)  BP(mean): --  RR: 18 (27 Mar 2019 14:40) (16 - 19)  SpO2: 97% (27 Mar 2019 14:40) (95% - 100%)    PHYSICAL EXAM:  GENERAL: NAD  HEAD:  Atraumatic, Normocephalic  EYES: EOMI, PERRLA, conjunctiva and sclera clear  NECK: Supple, No JVD  CHEST/LUNG: Clear to auscultation bilaterally; No wheeze  HEART: Regular rate and rhythm; No murmurs, rubs, or gallops  ABDOMEN: Soft, Nontender, Nondistended; Bowel sounds present  EXTREMITIES:  2+bipedal edema  PSYCH: AAOx3  NEUROLOGY: non-focal  SKIN: No rashes or lesions    LABS:                        9.8    3.8   )-----------( 206      ( 27 Mar 2019 06:37 )             28.8     03-27    139  |  96  |  14  ----------------------------<  133<H>  4.8   |  27  |  3.69<H>    Ca    8.6      27 Mar 2019 06:37  Phos  4.0     03-27  Mg     2.2     03-27    TPro  6.7  /  Alb  4.2  /  TBili  0.3  /  DBili  x   /  AST  20  /  ALT  12  /  AlkPhos  69  03-26    PT/INR - ( 25 Mar 2019 22:41 )   PT: 12.0 sec;   INR: 1.05 ratio                     RADIOLOGY & ADDITIONAL TESTS:    Imaging Personally Reviewed:    Consultant(s) Notes Reviewed:      Care Discussed with Consultants/Other Providers:
Patient is a 63y old  Female who presents with a chief complaint of chest pain, SOB (28 Mar 2019 12:54)      SUBJECTIVE / OVERNIGHT EVENTS: feels better.   Review of Systems  chest pain no  palpitations no  sob improving   nausea no  headache no    MEDICATIONS  (STANDING):  aspirin enteric coated 81 milliGRAM(s) Oral daily  atorvastatin 20 milliGRAM(s) Oral at bedtime  cinacalcet 60 milliGRAM(s) Oral daily  clopidogrel Tablet 75 milliGRAM(s) Oral daily  dextrose 5%. 1000 milliLiter(s) (50 mL/Hr) IV Continuous <Continuous>  dextrose 50% Injectable 12.5 Gram(s) IV Push once  dextrose 50% Injectable 25 Gram(s) IV Push once  DULoxetine 60 milliGRAM(s) Oral daily  heparin  Injectable 5000 Unit(s) SubCutaneous every 8 hours  insulin glargine Injectable (LANTUS) 7 Unit(s) SubCutaneous at bedtime  insulin lispro (HumaLOG) corrective regimen sliding scale   SubCutaneous three times a day before meals  insulin lispro (HumaLOG) corrective regimen sliding scale   SubCutaneous at bedtime  insulin lispro Injectable (HumaLOG) 3 Unit(s) SubCutaneous three times a day with meals  sevelamer carbonate 800 milliGRAM(s) Oral three times a day with meals    MEDICATIONS  (PRN):  dextrose 40% Gel 15 Gram(s) Oral once PRN Blood Glucose LESS THAN 70 milliGRAM(s)/deciliter  glucagon  Injectable 1 milliGRAM(s) IntraMuscular once PRN Glucose LESS THAN 70 milligrams/deciliter  oxyCODONE    5 mG/acetaminophen 325 mG 1 Tablet(s) Oral every 4 hours PRN Moderate Pain (4 - 6)      Vital Signs Last 24 Hrs  T(C): 36.7 (28 Mar 2019 15:33), Max: 36.8 (28 Mar 2019 11:41)  T(F): 98.1 (28 Mar 2019 15:33), Max: 98.2 (28 Mar 2019 11:41)  HR: 64 (28 Mar 2019 15:33) (64 - 89)  BP: 159/84 (28 Mar 2019 15:33) (151/80 - 166/89)  BP(mean): --  RR: 18 (28 Mar 2019 15:33) (17 - 18)  SpO2: 100% (28 Mar 2019 15:33) (94% - 100%)    PHYSICAL EXAM:  GENERAL: NAD  HEAD:  Atraumatic, Normocephalic  EYES: EOMI, PERRLA, conjunctiva and sclera clear  NECK: Supple, No JVD  CHEST/LUNG: Clear to auscultation bilaterally; No wheeze  HEART: Regular rate and rhythm; No murmurs, rubs, or gallops  ABDOMEN: Soft, Nontender, Nondistended; Bowel sounds present  EXTREMITIES:  2+ bipedal edema  PSYCH: AAOx3  NEUROLOGY: non-focal  SKIN: No rashes or lesions    LABS:                        9.8    3.8   )-----------( 206      ( 27 Mar 2019 06:37 )             28.8     03-28    138  |  97  |  10  ----------------------------<  183<H>  4.4   |  27  |  3.10<H>    Ca    8.4      28 Mar 2019 06:37  Phos  4.0     03-27  Mg     2.0     03-28                  RADIOLOGY & ADDITIONAL TESTS:    Imaging Personally Reviewed:    Consultant(s) Notes Reviewed:      Care Discussed with Consultants/Other Providers:
Patient is a 63y old  Female who presents with a chief complaint of chest pain, SOB (30 Mar 2019 16:30)      SUBJECTIVE / OVERNIGHT EVENTS: No new complaints.   Review of Systems  chest pain no  palpitations no  sob no  nausea no  headache no    MEDICATIONS  (STANDING):  aspirin enteric coated 81 milliGRAM(s) Oral daily  atorvastatin 20 milliGRAM(s) Oral at bedtime  cinacalcet 60 milliGRAM(s) Oral daily  clopidogrel Tablet 75 milliGRAM(s) Oral daily  dextrose 5%. 1000 milliLiter(s) (50 mL/Hr) IV Continuous <Continuous>  dextrose 50% Injectable 12.5 Gram(s) IV Push once  dextrose 50% Injectable 25 Gram(s) IV Push once  DULoxetine 60 milliGRAM(s) Oral daily  heparin  Injectable 5000 Unit(s) SubCutaneous every 8 hours  insulin glargine Injectable (LANTUS) 5 Unit(s) SubCutaneous at bedtime  insulin lispro (HumaLOG) corrective regimen sliding scale   SubCutaneous three times a day before meals  insulin lispro (HumaLOG) corrective regimen sliding scale   SubCutaneous at bedtime  insulin lispro Injectable (HumaLOG) 3 Unit(s) SubCutaneous three times a day with meals  metoprolol succinate ER 50 milliGRAM(s) Oral daily  sevelamer carbonate 800 milliGRAM(s) Oral three times a day with meals    MEDICATIONS  (PRN):  dextrose 40% Gel 15 Gram(s) Oral once PRN Blood Glucose LESS THAN 70 milliGRAM(s)/deciliter  glucagon  Injectable 1 milliGRAM(s) IntraMuscular once PRN Glucose LESS THAN 70 milligrams/deciliter  oxyCODONE    5 mG/acetaminophen 325 mG 1 Tablet(s) Oral every 4 hours PRN Moderate Pain (4 - 6)      Vital Signs Last 24 Hrs  T(C): 36.6 (30 Mar 2019 14:00), Max: 36.8 (29 Mar 2019 20:51)  T(F): 97.8 (30 Mar 2019 14:00), Max: 98.2 (29 Mar 2019 20:51)  HR: 74 (30 Mar 2019 14:00) (73 - 90)  BP: 158/81 (30 Mar 2019 14:00) (150/79 - 163/81)  BP(mean): --  RR: 19 (30 Mar 2019 14:00) (18 - 19)  SpO2: 96% (30 Mar 2019 14:00) (96% - 99%)    PHYSICAL EXAM:  GENERAL: NAD  HEAD:  Atraumatic, Normocephalic  EYES: EOMI, PERRLA, conjunctiva and sclera clear  NECK: Supple, No JVD  CHEST/LUNG: Clear to auscultation bilaterally; No wheeze  HEART: Regular rate and rhythm; No murmurs, rubs, or gallops  ABDOMEN: Soft, Nontender, Nondistended; Bowel sounds present  EXTREMITIES:  2+ bipedal edema  PSYCH: Anxious  NEUROLOGY: non-focal  SKIN: No rashes or lesions    LABS:                        10.2   3.5   )-----------( 197      ( 29 Mar 2019 15:57 )             30.3     03-30    136  |  94<L>  |  9   ----------------------------<  156<H>  4.2   |  25  |  3.90<H>    Ca    7.9<L>      30 Mar 2019 07:18  Phos  3.6     03-29                  RADIOLOGY & ADDITIONAL TESTS:    Imaging Personally Reviewed:    Consultant(s) Notes Reviewed:      Care Discussed with Consultants/Other Providers:
Patient is a 63y old  Female who presents with a chief complaint of chest pain, SOB (31 Mar 2019 10:58)      SUBJECTIVE / OVERNIGHT EVENTS: Comfortable without new complaints.   Review of Systems  chest pain no  palpitations no  sob no  nausea no  headache no    MEDICATIONS  (STANDING):  aspirin enteric coated 81 milliGRAM(s) Oral daily  atorvastatin 20 milliGRAM(s) Oral at bedtime  cinacalcet 60 milliGRAM(s) Oral daily  clopidogrel Tablet 75 milliGRAM(s) Oral daily  dextrose 5%. 1000 milliLiter(s) (50 mL/Hr) IV Continuous <Continuous>  dextrose 50% Injectable 12.5 Gram(s) IV Push once  dextrose 50% Injectable 25 Gram(s) IV Push once  DULoxetine 60 milliGRAM(s) Oral daily  heparin  Injectable 5000 Unit(s) SubCutaneous every 8 hours  insulin glargine Injectable (LANTUS) 5 Unit(s) SubCutaneous at bedtime  insulin lispro (HumaLOG) corrective regimen sliding scale   SubCutaneous three times a day before meals  insulin lispro (HumaLOG) corrective regimen sliding scale   SubCutaneous at bedtime  insulin lispro Injectable (HumaLOG) 3 Unit(s) SubCutaneous three times a day with meals  metoprolol succinate ER 50 milliGRAM(s) Oral daily  sevelamer carbonate 800 milliGRAM(s) Oral three times a day with meals    MEDICATIONS  (PRN):  dextrose 40% Gel 15 Gram(s) Oral once PRN Blood Glucose LESS THAN 70 milliGRAM(s)/deciliter  glucagon  Injectable 1 milliGRAM(s) IntraMuscular once PRN Glucose LESS THAN 70 milligrams/deciliter  oxyCODONE    5 mG/acetaminophen 325 mG 1 Tablet(s) Oral every 4 hours PRN Moderate Pain (4 - 6)      Vital Signs Last 24 Hrs  T(C): 36.6 (31 Mar 2019 12:50), Max: 36.8 (30 Mar 2019 21:04)  T(F): 97.9 (31 Mar 2019 12:50), Max: 98.3 (30 Mar 2019 21:04)  HR: 81 (31 Mar 2019 12:50) (74 - 82)  BP: 149/73 (31 Mar 2019 12:50) (139/76 - 169/81)  BP(mean): --  RR: 18 (31 Mar 2019 12:50) (17 - 19)  SpO2: 99% (31 Mar 2019 12:50) (96% - 100%)    PHYSICAL EXAM:  GENERAL: NAD, well-developed  HEAD:  Atraumatic, Normocephalic  EYES: EOMI, PERRLA, conjunctiva and sclera clear  NECK: Supple, No JVD  CHEST/LUNG: Clear to auscultation bilaterally; No wheeze  HEART: Regular rate and rhythm; No murmurs, rubs, or gallops  ABDOMEN: Soft, Nontender, Nondistended; Bowel sounds present  EXTREMITIES:  2+bipedal edema  PSYCH: AAOx3  NEUROLOGY: non-focal  SKIN: No rashes or lesions    LABS:                        10.2   3.5   )-----------( 197      ( 29 Mar 2019 15:57 )             30.3     03-31    135  |  93<L>  |  5<L>  ----------------------------<  120<H>  4.1   |  28  |  3.05<H>    Ca    8.0<L>      31 Mar 2019 06:28  Phos  3.6     03-29                  RADIOLOGY & ADDITIONAL TESTS:    Imaging Personally Reviewed:    Consultant(s) Notes Reviewed:      Care Discussed with Consultants/Other Providers:
Patient is a 63y old  Female who presents with a chief complaint of chest pain, SOB (29 Mar 2019 18:58)      SUBJECTIVE / OVERNIGHT EVENTS: No new complaints.   Review of Systems  chest pain no  palpitations no  sob no  nausea no  headache no    MEDICATIONS  (STANDING):  aspirin enteric coated 81 milliGRAM(s) Oral daily  atorvastatin 20 milliGRAM(s) Oral at bedtime  cinacalcet 60 milliGRAM(s) Oral daily  clopidogrel Tablet 75 milliGRAM(s) Oral daily  dextrose 5%. 1000 milliLiter(s) (50 mL/Hr) IV Continuous <Continuous>  dextrose 50% Injectable 12.5 Gram(s) IV Push once  dextrose 50% Injectable 25 Gram(s) IV Push once  DULoxetine 60 milliGRAM(s) Oral daily  heparin  Injectable 5000 Unit(s) SubCutaneous every 8 hours  insulin glargine Injectable (LANTUS) 5 Unit(s) SubCutaneous at bedtime  insulin lispro (HumaLOG) corrective regimen sliding scale   SubCutaneous three times a day before meals  insulin lispro (HumaLOG) corrective regimen sliding scale   SubCutaneous at bedtime  insulin lispro Injectable (HumaLOG) 3 Unit(s) SubCutaneous three times a day with meals  metoprolol succinate ER 50 milliGRAM(s) Oral daily  sevelamer carbonate 800 milliGRAM(s) Oral three times a day with meals    MEDICATIONS  (PRN):  dextrose 40% Gel 15 Gram(s) Oral once PRN Blood Glucose LESS THAN 70 milliGRAM(s)/deciliter  glucagon  Injectable 1 milliGRAM(s) IntraMuscular once PRN Glucose LESS THAN 70 milligrams/deciliter  oxyCODONE    5 mG/acetaminophen 325 mG 1 Tablet(s) Oral every 4 hours PRN Moderate Pain (4 - 6)      Vital Signs Last 24 Hrs  T(C): 36.6 (29 Mar 2019 17:55), Max: 36.8 (29 Mar 2019 04:43)  T(F): 97.8 (29 Mar 2019 17:55), Max: 98.3 (29 Mar 2019 12:50)  HR: 75 (29 Mar 2019 17:55) (69 - 78)  BP: 163/81 (29 Mar 2019 17:55) (128/72 - 163/81)  BP(mean): --  RR: 18 (29 Mar 2019 17:55) (17 - 18)  SpO2: 99% (29 Mar 2019 17:55) (95% - 99%)    PHYSICAL EXAM:  GENERAL: NAD  HEAD:  Atraumatic, Normocephalic  EYES: EOMI, PERRLA, conjunctiva and sclera clear  NECK: Supple, No JVD  CHEST/LUNG: Clear to auscultation bilaterally; No wheeze  HEART: Regular rate and rhythm; No murmurs, rubs, or gallops  ABDOMEN: Soft, Nontender, Nondistended; Bowel sounds present  EXTREMITIES:  2+ Peripheral Pulses, No clubbing, cyanosis, or edema  PSYCH: Anxious  NEUROLOGY: non-focal  SKIN: No rashes or lesions    LABS:                        10.2   3.5   )-----------( 197      ( 29 Mar 2019 15:57 )             30.3     03-29    138  |  96  |  15  ----------------------------<  79  4.0   |  28  |  4.85<H>    Ca    7.8<L>      29 Mar 2019 15:57  Phos  3.6     03-29  Mg     2.0     03-28                  RADIOLOGY & ADDITIONAL TESTS:    Imaging Personally Reviewed:    Consultant(s) Notes Reviewed:      Care Discussed with Consultants/Other Providers:

## 2019-04-04 NOTE — DISCHARGE NOTE NURSING/CASE MANAGEMENT/SOCIAL WORK - NSDCDPATPORTLINK_GEN_ALL_CORE
You can access the [facility] Patient Portal, offered by [facility], by registering with the following website: [email or web address]

## 2019-04-04 NOTE — PROVIDER CONTACT NOTE (OTHER) - ACTION/TREATMENT ORDERED:
NP made aware. Will continue to monitor patient.
Will continue to educate patient.
NP aware & at patient's bedside assessing patient & ordered to hold Insulin Lispro 3 Units Subcutaneous & Follow Hypoglycemia Protocol.
PA aware. Follow sliding scale as ordered. F/u with endocrine. STAT labs BMP and beta hydroxy-butyrate ordered. Continue to monitor.

## 2019-04-15 LAB — ACID FAST STN SPEC: SIGNIFICANT CHANGE UP

## 2019-04-18 ENCOUNTER — INPATIENT (INPATIENT)
Facility: HOSPITAL | Age: 64
LOS: 1 days | Discharge: HOME | DRG: 604 | End: 2019-04-20
Attending: INTERNAL MEDICINE | Admitting: INTERNAL MEDICINE
Payer: MEDICARE

## 2019-04-18 VITALS
OXYGEN SATURATION: 100 % | DIASTOLIC BLOOD PRESSURE: 82 MMHG | HEART RATE: 73 BPM | RESPIRATION RATE: 18 BRPM | TEMPERATURE: 98 F | HEIGHT: 64 IN | SYSTOLIC BLOOD PRESSURE: 154 MMHG | WEIGHT: 117.95 LBS

## 2019-04-18 DIAGNOSIS — Z98.89 OTHER SPECIFIED POSTPROCEDURAL STATES: Chronic | ICD-10-CM

## 2019-04-18 DIAGNOSIS — S09.90XA UNSPECIFIED INJURY OF HEAD, INITIAL ENCOUNTER: ICD-10-CM

## 2019-04-18 LAB
ALBUMIN SERPL ELPH-MCNC: 3.9 G/DL — SIGNIFICANT CHANGE UP (ref 3.3–5)
ALBUMIN SERPL ELPH-MCNC: 4.7 G/DL — SIGNIFICANT CHANGE UP (ref 3.3–5)
ALP SERPL-CCNC: 76 U/L — SIGNIFICANT CHANGE UP (ref 40–120)
ALP SERPL-CCNC: 93 U/L — SIGNIFICANT CHANGE UP (ref 40–120)
ALT FLD-CCNC: 11 U/L — SIGNIFICANT CHANGE UP (ref 10–45)
ALT FLD-CCNC: 16 U/L — SIGNIFICANT CHANGE UP (ref 10–45)
ANION GAP SERPL CALC-SCNC: 17 MMOL/L — SIGNIFICANT CHANGE UP (ref 5–17)
ANION GAP SERPL CALC-SCNC: 19 MMOL/L — HIGH (ref 5–17)
ANION GAP SERPL CALC-SCNC: 20 MMOL/L — HIGH (ref 5–17)
APTT BLD: 30.9 SEC — SIGNIFICANT CHANGE UP (ref 27.5–36.3)
AST SERPL-CCNC: 19 U/L — SIGNIFICANT CHANGE UP (ref 10–40)
AST SERPL-CCNC: 27 U/L — SIGNIFICANT CHANGE UP (ref 10–40)
BASOPHILS # BLD AUTO: 0 K/UL — SIGNIFICANT CHANGE UP (ref 0–0.2)
BASOPHILS NFR BLD AUTO: 0.7 % — SIGNIFICANT CHANGE UP (ref 0–2)
BILIRUB SERPL-MCNC: 0.2 MG/DL — SIGNIFICANT CHANGE UP (ref 0.2–1.2)
BILIRUB SERPL-MCNC: 0.3 MG/DL — SIGNIFICANT CHANGE UP (ref 0.2–1.2)
BUN SERPL-MCNC: 14 MG/DL — SIGNIFICANT CHANGE UP (ref 7–23)
BUN SERPL-MCNC: 45 MG/DL — HIGH (ref 7–23)
BUN SERPL-MCNC: 47 MG/DL — HIGH (ref 7–23)
CALCIUM SERPL-MCNC: 10.1 MG/DL — SIGNIFICANT CHANGE UP (ref 8.4–10.5)
CALCIUM SERPL-MCNC: 11.3 MG/DL — HIGH (ref 8.4–10.5)
CALCIUM SERPL-MCNC: 9 MG/DL — SIGNIFICANT CHANGE UP (ref 8.4–10.5)
CHLORIDE SERPL-SCNC: 89 MMOL/L — LOW (ref 96–108)
CHLORIDE SERPL-SCNC: 91 MMOL/L — LOW (ref 96–108)
CHLORIDE SERPL-SCNC: 92 MMOL/L — LOW (ref 96–108)
CO2 SERPL-SCNC: 24 MMOL/L — SIGNIFICANT CHANGE UP (ref 22–31)
CO2 SERPL-SCNC: 24 MMOL/L — SIGNIFICANT CHANGE UP (ref 22–31)
CO2 SERPL-SCNC: 27 MMOL/L — SIGNIFICANT CHANGE UP (ref 22–31)
CREAT SERPL-MCNC: 2.5 MG/DL — HIGH (ref 0.5–1.3)
CREAT SERPL-MCNC: 5.52 MG/DL — HIGH (ref 0.5–1.3)
CREAT SERPL-MCNC: 5.72 MG/DL — HIGH (ref 0.5–1.3)
EOSINOPHIL # BLD AUTO: 0 K/UL — SIGNIFICANT CHANGE UP (ref 0–0.5)
EOSINOPHIL NFR BLD AUTO: 0 % — SIGNIFICANT CHANGE UP (ref 0–6)
GAS PNL BLDV: SIGNIFICANT CHANGE UP
GLUCOSE BLDC GLUCOMTR-MCNC: 148 MG/DL — HIGH (ref 70–99)
GLUCOSE BLDC GLUCOMTR-MCNC: 154 MG/DL — HIGH (ref 70–99)
GLUCOSE BLDC GLUCOMTR-MCNC: 189 MG/DL — HIGH (ref 70–99)
GLUCOSE BLDC GLUCOMTR-MCNC: 250 MG/DL — HIGH (ref 70–99)
GLUCOSE SERPL-MCNC: 219 MG/DL — HIGH (ref 70–99)
GLUCOSE SERPL-MCNC: 248 MG/DL — HIGH (ref 70–99)
GLUCOSE SERPL-MCNC: 300 MG/DL — HIGH (ref 70–99)
HCT VFR BLD CALC: 37.2 % — SIGNIFICANT CHANGE UP (ref 34.5–45)
HGB BLD-MCNC: 12 G/DL — SIGNIFICANT CHANGE UP (ref 11.5–15.5)
INR BLD: 0.94 RATIO — SIGNIFICANT CHANGE UP (ref 0.88–1.16)
LYMPHOCYTES # BLD AUTO: 0.8 K/UL — LOW (ref 1–3.3)
LYMPHOCYTES # BLD AUTO: 14.2 % — SIGNIFICANT CHANGE UP (ref 13–44)
MCHC RBC-ENTMCNC: 32.1 GM/DL — SIGNIFICANT CHANGE UP (ref 32–36)
MCHC RBC-ENTMCNC: 32.7 PG — SIGNIFICANT CHANGE UP (ref 27–34)
MCV RBC AUTO: 102 FL — HIGH (ref 80–100)
MONOCYTES # BLD AUTO: 0.8 K/UL — SIGNIFICANT CHANGE UP (ref 0–0.9)
MONOCYTES NFR BLD AUTO: 14.5 % — HIGH (ref 2–14)
NEUTROPHILS # BLD AUTO: 4.1 K/UL — SIGNIFICANT CHANGE UP (ref 1.8–7.4)
NEUTROPHILS NFR BLD AUTO: 70.6 % — SIGNIFICANT CHANGE UP (ref 43–77)
PLATELET # BLD AUTO: 292 K/UL — SIGNIFICANT CHANGE UP (ref 150–400)
POTASSIUM SERPL-MCNC: 3.9 MMOL/L — SIGNIFICANT CHANGE UP (ref 3.5–5.3)
POTASSIUM SERPL-MCNC: 6 MMOL/L — HIGH (ref 3.5–5.3)
POTASSIUM SERPL-MCNC: 6.2 MMOL/L — CRITICAL HIGH (ref 3.5–5.3)
POTASSIUM SERPL-SCNC: 3.9 MMOL/L — SIGNIFICANT CHANGE UP (ref 3.5–5.3)
POTASSIUM SERPL-SCNC: 6 MMOL/L — HIGH (ref 3.5–5.3)
POTASSIUM SERPL-SCNC: 6.2 MMOL/L — CRITICAL HIGH (ref 3.5–5.3)
PROT SERPL-MCNC: 6.1 G/DL — SIGNIFICANT CHANGE UP (ref 6–8.3)
PROT SERPL-MCNC: 7.7 G/DL — SIGNIFICANT CHANGE UP (ref 6–8.3)
PROTHROM AB SERPL-ACNC: 10.8 SEC — SIGNIFICANT CHANGE UP (ref 10–12.9)
RBC # BLD: 3.65 M/UL — LOW (ref 3.8–5.2)
RBC # FLD: 14 % — SIGNIFICANT CHANGE UP (ref 10.3–14.5)
SODIUM SERPL-SCNC: 133 MMOL/L — LOW (ref 135–145)
SODIUM SERPL-SCNC: 135 MMOL/L — SIGNIFICANT CHANGE UP (ref 135–145)
SODIUM SERPL-SCNC: 135 MMOL/L — SIGNIFICANT CHANGE UP (ref 135–145)
WBC # BLD: 6.2 K/UL — SIGNIFICANT CHANGE UP (ref 3.8–10.5)
WBC # FLD AUTO: 6.2 K/UL — SIGNIFICANT CHANGE UP (ref 3.8–10.5)

## 2019-04-18 PROCEDURE — 71250 CT THORAX DX C-: CPT | Mod: 26

## 2019-04-18 PROCEDURE — 99223 1ST HOSP IP/OBS HIGH 75: CPT

## 2019-04-18 PROCEDURE — 70486 CT MAXILLOFACIAL W/O DYE: CPT | Mod: 26

## 2019-04-18 PROCEDURE — 76377 3D RENDER W/INTRP POSTPROCES: CPT | Mod: 26

## 2019-04-18 PROCEDURE — 70450 CT HEAD/BRAIN W/O DYE: CPT | Mod: 26

## 2019-04-18 PROCEDURE — 99291 CRITICAL CARE FIRST HOUR: CPT | Mod: GC

## 2019-04-18 PROCEDURE — 93010 ELECTROCARDIOGRAM REPORT: CPT

## 2019-04-18 RX ORDER — ALBUTEROL 90 UG/1
10 AEROSOL, METERED ORAL ONCE
Qty: 0 | Refills: 0 | Status: DISCONTINUED | OUTPATIENT
Start: 2019-04-18 | End: 2019-04-18

## 2019-04-18 RX ORDER — IPRATROPIUM/ALBUTEROL SULFATE 18-103MCG
3 AEROSOL WITH ADAPTER (GRAM) INHALATION EVERY 6 HOURS
Qty: 0 | Refills: 0 | Status: DISCONTINUED | OUTPATIENT
Start: 2019-04-18 | End: 2019-04-20

## 2019-04-18 RX ORDER — SODIUM CHLORIDE 9 MG/ML
1000 INJECTION, SOLUTION INTRAVENOUS
Qty: 0 | Refills: 0 | Status: DISCONTINUED | OUTPATIENT
Start: 2019-04-18 | End: 2019-04-20

## 2019-04-18 RX ORDER — INSULIN LISPRO 100/ML
3 VIAL (ML) SUBCUTANEOUS
Qty: 0 | Refills: 0 | Status: DISCONTINUED | OUTPATIENT
Start: 2019-04-18 | End: 2019-04-20

## 2019-04-18 RX ORDER — DULOXETINE HYDROCHLORIDE 30 MG/1
60 CAPSULE, DELAYED RELEASE ORAL DAILY
Qty: 0 | Refills: 0 | Status: DISCONTINUED | OUTPATIENT
Start: 2019-04-18 | End: 2019-04-20

## 2019-04-18 RX ORDER — INSULIN HUMAN 100 [IU]/ML
10 INJECTION, SOLUTION SUBCUTANEOUS ONCE
Qty: 0 | Refills: 0 | Status: DISCONTINUED | OUTPATIENT
Start: 2019-04-18 | End: 2019-04-18

## 2019-04-18 RX ORDER — METOPROLOL TARTRATE 50 MG
50 TABLET ORAL DAILY
Qty: 0 | Refills: 0 | Status: DISCONTINUED | OUTPATIENT
Start: 2019-04-18 | End: 2019-04-20

## 2019-04-18 RX ORDER — ATORVASTATIN CALCIUM 80 MG/1
20 TABLET, FILM COATED ORAL AT BEDTIME
Qty: 0 | Refills: 0 | Status: DISCONTINUED | OUTPATIENT
Start: 2019-04-18 | End: 2019-04-20

## 2019-04-18 RX ORDER — DEXTROSE 50 % IN WATER 50 %
15 SYRINGE (ML) INTRAVENOUS ONCE
Qty: 0 | Refills: 0 | Status: DISCONTINUED | OUTPATIENT
Start: 2019-04-18 | End: 2019-04-20

## 2019-04-18 RX ORDER — ACETAMINOPHEN 500 MG
650 TABLET ORAL EVERY 6 HOURS
Qty: 0 | Refills: 0 | Status: DISCONTINUED | OUTPATIENT
Start: 2019-04-18 | End: 2019-04-20

## 2019-04-18 RX ORDER — DEXTROSE 50 % IN WATER 50 %
12.5 SYRINGE (ML) INTRAVENOUS ONCE
Qty: 0 | Refills: 0 | Status: DISCONTINUED | OUTPATIENT
Start: 2019-04-18 | End: 2019-04-20

## 2019-04-18 RX ORDER — HEPARIN SODIUM 5000 [USP'U]/ML
5000 INJECTION INTRAVENOUS; SUBCUTANEOUS EVERY 12 HOURS
Qty: 0 | Refills: 0 | Status: DISCONTINUED | OUTPATIENT
Start: 2019-04-18 | End: 2019-04-20

## 2019-04-18 RX ORDER — INSULIN LISPRO 100/ML
VIAL (ML) SUBCUTANEOUS AT BEDTIME
Qty: 0 | Refills: 0 | Status: DISCONTINUED | OUTPATIENT
Start: 2019-04-18 | End: 2019-04-20

## 2019-04-18 RX ORDER — OXYCODONE AND ACETAMINOPHEN 5; 325 MG/1; MG/1
1 TABLET ORAL EVERY 6 HOURS
Qty: 0 | Refills: 0 | Status: DISCONTINUED | OUTPATIENT
Start: 2019-04-18 | End: 2019-04-20

## 2019-04-18 RX ORDER — DEXTROSE 50 % IN WATER 50 %
25 SYRINGE (ML) INTRAVENOUS ONCE
Qty: 0 | Refills: 0 | Status: DISCONTINUED | OUTPATIENT
Start: 2019-04-18 | End: 2019-04-20

## 2019-04-18 RX ORDER — INSULIN HUMAN 100 [IU]/ML
5 INJECTION, SOLUTION SUBCUTANEOUS ONCE
Qty: 0 | Refills: 0 | Status: COMPLETED | OUTPATIENT
Start: 2019-04-18 | End: 2019-04-18

## 2019-04-18 RX ORDER — INFLUENZA VIRUS VACCINE 15; 15; 15; 15 UG/.5ML; UG/.5ML; UG/.5ML; UG/.5ML
0.5 SUSPENSION INTRAMUSCULAR ONCE
Qty: 0 | Refills: 0 | Status: DISCONTINUED | OUTPATIENT
Start: 2019-04-18 | End: 2019-04-20

## 2019-04-18 RX ORDER — ACETAMINOPHEN 500 MG
650 TABLET ORAL ONCE
Qty: 0 | Refills: 0 | Status: COMPLETED | OUTPATIENT
Start: 2019-04-18 | End: 2019-04-18

## 2019-04-18 RX ORDER — GLUCAGON INJECTION, SOLUTION 0.5 MG/.1ML
1 INJECTION, SOLUTION SUBCUTANEOUS ONCE
Qty: 0 | Refills: 0 | Status: DISCONTINUED | OUTPATIENT
Start: 2019-04-18 | End: 2019-04-20

## 2019-04-18 RX ORDER — ACETAMINOPHEN 500 MG
1000 TABLET ORAL ONCE
Qty: 0 | Refills: 0 | Status: COMPLETED | OUTPATIENT
Start: 2019-04-18 | End: 2019-04-18

## 2019-04-18 RX ORDER — CLOPIDOGREL BISULFATE 75 MG/1
75 TABLET, FILM COATED ORAL DAILY
Qty: 0 | Refills: 0 | Status: DISCONTINUED | OUTPATIENT
Start: 2019-04-18 | End: 2019-04-20

## 2019-04-18 RX ORDER — INSULIN LISPRO 100/ML
VIAL (ML) SUBCUTANEOUS
Qty: 0 | Refills: 0 | Status: DISCONTINUED | OUTPATIENT
Start: 2019-04-18 | End: 2019-04-20

## 2019-04-18 RX ORDER — SEVELAMER CARBONATE 2400 MG/1
800 POWDER, FOR SUSPENSION ORAL
Qty: 0 | Refills: 0 | Status: DISCONTINUED | OUTPATIENT
Start: 2019-04-18 | End: 2019-04-20

## 2019-04-18 RX ORDER — INSULIN GLARGINE 100 [IU]/ML
5 INJECTION, SOLUTION SUBCUTANEOUS AT BEDTIME
Qty: 0 | Refills: 0 | Status: DISCONTINUED | OUTPATIENT
Start: 2019-04-18 | End: 2019-04-18

## 2019-04-18 RX ORDER — TETANUS TOXOID, REDUCED DIPHTHERIA TOXOID AND ACELLULAR PERTUSSIS VACCINE, ADSORBED 5; 2.5; 8; 8; 2.5 [IU]/.5ML; [IU]/.5ML; UG/.5ML; UG/.5ML; UG/.5ML
0.5 SUSPENSION INTRAMUSCULAR ONCE
Qty: 0 | Refills: 0 | Status: COMPLETED | OUTPATIENT
Start: 2019-04-18 | End: 2019-04-18

## 2019-04-18 RX ORDER — INSULIN GLARGINE 100 [IU]/ML
4 INJECTION, SOLUTION SUBCUTANEOUS AT BEDTIME
Qty: 0 | Refills: 0 | Status: DISCONTINUED | OUTPATIENT
Start: 2019-04-18 | End: 2019-04-20

## 2019-04-18 RX ORDER — ASPIRIN/CALCIUM CARB/MAGNESIUM 324 MG
81 TABLET ORAL DAILY
Qty: 0 | Refills: 0 | Status: DISCONTINUED | OUTPATIENT
Start: 2019-04-18 | End: 2019-04-20

## 2019-04-18 RX ADMIN — Medication 1: at 17:24

## 2019-04-18 RX ADMIN — SEVELAMER CARBONATE 800 MILLIGRAM(S): 2400 POWDER, FOR SUSPENSION ORAL at 21:35

## 2019-04-18 RX ADMIN — ATORVASTATIN CALCIUM 20 MILLIGRAM(S): 80 TABLET, FILM COATED ORAL at 21:36

## 2019-04-18 RX ADMIN — INSULIN GLARGINE 4 UNIT(S): 100 INJECTION, SOLUTION SUBCUTANEOUS at 22:28

## 2019-04-18 RX ADMIN — INSULIN HUMAN 5 UNIT(S): 100 INJECTION, SOLUTION SUBCUTANEOUS at 11:32

## 2019-04-18 RX ADMIN — Medication 1000 MILLIGRAM(S): at 00:00

## 2019-04-18 RX ADMIN — Medication 400 MILLIGRAM(S): at 12:17

## 2019-04-18 RX ADMIN — Medication 3 UNIT(S): at 17:25

## 2019-04-18 RX ADMIN — OXYCODONE AND ACETAMINOPHEN 1 TABLET(S): 5; 325 TABLET ORAL at 23:23

## 2019-04-18 RX ADMIN — TETANUS TOXOID, REDUCED DIPHTHERIA TOXOID AND ACELLULAR PERTUSSIS VACCINE, ADSORBED 0.5 MILLILITER(S): 5; 2.5; 8; 8; 2.5 SUSPENSION INTRAMUSCULAR at 09:55

## 2019-04-18 NOTE — ED ADULT NURSE REASSESSMENT NOTE - NS ED NURSE REASSESS COMMENT FT1
Pt resting comfortably in stretcher, unlabored, spontaneous respirations, NAD. Pt requesting to eat, awaiting CT chest results. Awaiting admission to the hospital at this time. Pt aware of plan of care at this time. Pt resting comfortably in stretcher, unlabored, spontaneous respirations, NAD. Repeat CMP and potassium to be collected in one hour per MD Caldera. Pt requesting to eat, awaiting CT chest results. Awaiting admission to the hospital at this time. Pt aware of plan of care at this time.

## 2019-04-18 NOTE — ED PROVIDER NOTE - ATTENDING CONTRIBUTION TO CARE
Dr. Avila : I have personally seen and examined this patient at the bedside. I have fully participated in the care of this patient. I have reviewed all pertinent clinical information, including history, physical exam, plan and the Resident's note and agree except as noted.       62yo F PMH CS, Coronary Artery Disease, CHF, COPD, ESRD on HD Tu/Th/Sa, hyperlipidemia pw mechanical fall sp tripping on the curb. went down head first no loc. felt dizzy initially non at this time. ambulatory. on Plavix and asa. pt was going to her dialysis today and fell.   Denies f/c/n/v/cp/sob/palpitations/cough/abd.pain/d/c/dysuria/hematuria. no sick contacts/recent travel.    PE:  head; hematoma to left forehead abrasion to forehead, nose mild ttp to nose abraion to lower lip; no septal hematoma + nose bleed stopped normocephalic  eyes: perrla  Heart: rrr s1s2  lungs: ctab  abd: soft, nt nd + bs no rebound/guarding no cva ttp  le: no calf ttp  back: no midline cervical/thoracic/lumbar ttp    -->head trauma will fu ct head, max fac; labs - pt hyperkalemic; dr. Burger from nephro saw pt will get dialysis today ---admit to Dr. muller

## 2019-04-18 NOTE — ED PROVIDER NOTE - PHYSICAL EXAMINATION
Gen: NAD  Head: abrasion to left eyebrow and left forehead  HEENT: PERRL, EOMI, +dried blood from bilateral nares, no nasal septal hematoma, oral mucosa moist, normal conjunctiva, oropharynx clear without exudate or erythema  Lung: CTAB, no respiratory distress, no wheezing, rales, rhonchi  CV: normal s1/s2, rrr, no murmurs, Normal perfusion, pulses 2+ throughout  Abd: soft, NTND  MSK: No edema, no visible deformities, full range of motion in all 4 extremities  Neuro: No focal neurologic deficits  Skin: No rash   Psych: normal affect

## 2019-04-18 NOTE — ED PROVIDER NOTE - CARE PLAN
Principal Discharge DX:	Abrasion Principal Discharge DX:	Head trauma, initial encounter  Secondary Diagnosis:	Hyperkalemia  Secondary Diagnosis:	ESRD (end stage renal disease) on dialysis  Secondary Diagnosis:	Type 1 diabetes mellitus without complication

## 2019-04-18 NOTE — ED PROVIDER NOTE - SECONDARY DIAGNOSIS.
Hyperkalemia ESRD (end stage renal disease) on dialysis Type 1 diabetes mellitus without complication

## 2019-04-18 NOTE — H&P ADULT - NSHPSOCIALHISTORY_GEN_ALL_CORE
Social History:    Marital Status:  ( x  )    (   ) Single    (   )    (  )   Occupation:   Lives with: (  ) alone  (  ) children   ( x ) spouse   (  ) parents  (  ) other    Substance Use (street drugs): (  x) never used  (  ) other:  Tobacco Usage:  ( x  ) never smoked   (   ) former smoker   (   ) current smoker  (     ) pack years  (        ) last cigarette date  Alcohol Usage: denies    (     ) Advanced Directives: (     ) None    (      ) DNR    (     ) DNI    (     ) Health Care Proxy:

## 2019-04-18 NOTE — CHART NOTE - NSCHARTNOTEFT_GEN_A_CORE
Consult request received by primary team. Patient with DM1 and well known to our service. Recommend decrease Lantus to 4 units qhs and continue with Humalog 3 units TID (may need 4 units TID based on po intake during admission). She was on Levemir 4 units qhs last admission. Full consult to follow in AM.    Sky Kaur MD  Endocrine Fellow

## 2019-04-18 NOTE — ED PROVIDER NOTE - CHIEF COMPLAINT
Call your healthcare provider right away if you get any of the following signs or symptoms of bleeding problems:   * Pain, color, or temperature changes to any part of your body   * Headaches, dizziness or weakness   * Unusual bruising (unexplained or growing in size)   * Nosebleeds   * Bleeding gums   * Bleeding from cuts that take a long time to stop   * Menstrual bleeding or vaginal bleeding that is heavier than normal   * Pink or brown urine   * Red or black stools   * Coughing up blood   * Vomiting blood or material that looks like coffee grounds    Update Anticoagulation Clinic (Coumadin Clinic) with any of the following:   * Medication changes (new, stopped or dose changes, this includes over-the-counter medications and supplements)   * Planning on having any surgery, medical or dental procedures   * Diet changes   * Falls  (you should go to Emergency Department immediately for evaluation, then notify Anticoagulation Clinic)    Please, do not wait until your next appointment to update us on changes.         The patient is a 63y Female complaining of

## 2019-04-18 NOTE — H&P ADULT - ASSESSMENT
63 f with  s/p Fall  - pain control  - wound care    ESRD  - HD  - Nephrology evaluation Dr. Schmidt    Diabetes control  - endocrine evaluation called  ACS (acute coronary syndrome) 1/2015 - cath revealed 100% ostial stenosis not amenable to PCI - medical management    CAD (coronary artery disease) s/p stents  - continue Rx    CHF (congestive heart failure)  - HD, continue Rx    COPD (chronic obstructive pulmonary disease)   - stable, nebs prn  Further action as per clinical course   Pierce Viera MD pager [phone]

## 2019-04-18 NOTE — H&P ADULT - NSHPLABSRESULTS_GEN_ALL_CORE
12.0   6.2   )-----------( 292      ( 18 Apr 2019 10:08 )             37.2       04-18    135  |  92<L>  |  47<H>  ----------------------------<  248<H>  6.0<H>   |  24  |  5.72<H>    Ca    10.1      18 Apr 2019 13:13    TPro  6.1  /  Alb  3.9  /  TBili  0.2  /  DBili  x   /  AST  19  /  ALT  11  /  AlkPhos  76  04-18                  PT/INR - ( 18 Apr 2019 10:08 )   PT: 10.8 sec;   INR: 0.94 ratio         PTT - ( 18 Apr 2019 10:08 )  PTT:30.9 sec    Lactate Trend            CAPILLARY BLOOD GLUCOSE      POCT Blood Glucose.: 283 mg/dL (18 Apr 2019 11:31)

## 2019-04-18 NOTE — H&P ADULT - NSHPPHYSICALEXAM_GEN_ALL_CORE
PHYSICAL EXAMINATION:  Vital Signs Last 24 Hrs  T(C): 36.1 (18 Apr 2019 11:36), Max: 36.6 (18 Apr 2019 09:48)  T(F): 96.9 (18 Apr 2019 11:36), Max: 97.9 (18 Apr 2019 09:48)  HR: 66 (18 Apr 2019 11:36) (66 - 73)  BP: 152/75 (18 Apr 2019 11:36) (152/75 - 161/78)  BP(mean): --  RR: 22 (18 Apr 2019 11:36) (18 - 22)  SpO2: 97% (18 Apr 2019 11:36) (97% - 100%)  CAPILLARY BLOOD GLUCOSE      POCT Blood Glucose.: 283 mg/dL (18 Apr 2019 11:31)      GENERAL: NAD  HEAD:  Frontal and nasal ecchymosis, normocephalic  EYES: sclera anicteric  ENMT: mucous membranes moist  NECK: supple, No JVD  CHEST/LUNG: clear to auscultation bilaterally; no rales, rhonchi, or wheezing b/l  HEART: normal S1, S2  ABDOMEN: BS+, soft, ND, NT   EXTREMITIES:  pulses palpable; no clubbing, cyanosis, or edema b/l LEs  NEURO: awake, alert, interactive; moves all extremities  SKIN: no rashes or lesions

## 2019-04-18 NOTE — ED ADULT NURSE NOTE - OBJECTIVE STATEMENT
62 y/o female PMH ESRD, HTN, HLD, TIA, CAD and diabetes presents to ED reporting h/a. Pt reports falling today on concrete, pt denies LOC and reports being on Plavix and ASA. 62 y/o female PMH ESRD, HTN, HLD, TIA, CAD and diabetes presents to ED reporting h/a. Pt reports falling today on concrete, pt denies LOC and reports being on Plavix and ASA. Fistula to L arm, due for dialysis today & insulin pump to abdomen. On exam, AOx3, speaking in complete sentences. Abrasion to L forehead, nose and L edge of mouth, no active bleeding at this time. PERRL 2mm, equal strength and sensation in all 4 extremities. L leg, +1 pitting edema. Pt denies h/a, SOB, CP, n/v/d, blurry vision and fever/chills. Pt denies weakness, numbness or tingling at this time. Heplock placed, labs sent. Awaiting imaging at this time. Seen and evaluated by MD.

## 2019-04-18 NOTE — ED PROVIDER NOTE - CLINICAL SUMMARY MEDICAL DECISION MAKING FREE TEXT BOX
62yo female with multiple medical comorbidities presenting with mechanical fall and head injury, likely with nasal fracture, will check labs, CT head/maxface/chest, reassess. Niki Caldera DO

## 2019-04-18 NOTE — ED ADULT NURSE REASSESSMENT NOTE - NS ED NURSE REASSESS COMMENT FT1
Pt back from dialysis, AOx3, speaking in complete sentences, unlabored, spontaneous respirations, NAD.

## 2019-04-18 NOTE — CONSULT NOTE ADULT - SUBJECTIVE AND OBJECTIVE BOX
[facility]  [phone]  NEPHROLOGY      INITIAL CONSULT NOTE  --------------------------------------------------------------------------------  HPI:      62yo female PMH CS, Coronary Artery Disease, congestive heart failure, COPD, ESRD on HD Tu/Th/Sa, hyperlipidemia, presenting with nose pain s/p mechanical fall. Patient was getting ready to go to HD today, and tripped and fell onto face, no loss of consciousness, hit chest and face on floor. On Plavix.  admitted and in er noticed with hyperkalemia renal consult called.   PAST HISTORY  --------------------------------------------------------------------------------  PAST MEDICAL & SURGICAL HISTORY:  COPD (chronic obstructive pulmonary disease)  Localized enlarged lymph nodes  CHF (congestive heart failure): EF 40-45%  Subclavian vein stenosis, left: s/p stent  DKA, type 1: 1/2015  ACS (acute coronary syndrome): 1/2015 - cath revealed 100% ostial stenosis not amenable to PCI - medical management  TIA (transient ischemic attack): x 2 - 8-9 years ago prior to ASD/VSD repair  CAD (coronary artery disease): s/p stents  Gout: past  CVA (cerebral infarction): with no residual, 8 yrs ago, prior to heart surgery - ST memory loss  Peripheral vascular disease: occluded left fem-pop bypass 5/2015  Diabetes mellitus type 1: Insulin Dependent -  ESRD (end stage renal disease): dialysis  M, tue, thursday, saturday  Hyperlipidemia  Status post device closure of ASD: &quot;clamshell&quot;  History of cardiac catheterization: 1/2015 - no intervention  S/P femoral-popliteal bypass surgery: L and R in 2013 with graft; 5/2015 CFA angioplasty left and ileofemoral endarterectomywith vein patch angioplasty of left fem-pop bypass graft  Multiple vascular surgery both leg, left fempop bypass revision 11/2015  AV (arteriovenous fistula): Left AV graft; revision with stent placement 2-3 years ago  S/P cholecystectomy    FAMILY HISTORY:  Family history of smoking  Family history of hypertension  Family history of cancer (Sibling)    PAST SOCIAL HISTORY: past tobacco use no alcohol     ALLERGIES & MEDICATIONS  --------------------------------------------------------------------------------  Allergies    No Known Allergies    Intolerances      Standing Inpatient Medications  ALBUTerol/ipratropium for Nebulization 3 milliLiter(s) Nebulizer every 6 hours  aspirin enteric coated 81 milliGRAM(s) Oral daily  atorvastatin 20 milliGRAM(s) Oral at bedtime  clopidogrel Tablet 75 milliGRAM(s) Oral daily  dextrose 5%. 1000 milliLiter(s) IV Continuous <Continuous>  dextrose 50% Injectable 12.5 Gram(s) IV Push once  dextrose 50% Injectable 25 Gram(s) IV Push once  dextrose 50% Injectable 25 Gram(s) IV Push once  DULoxetine 60 milliGRAM(s) Oral daily  heparin  Injectable 5000 Unit(s) SubCutaneous every 12 hours  insulin glargine Injectable (LANTUS) 4 Unit(s) SubCutaneous at bedtime  insulin lispro (HumaLOG) corrective regimen sliding scale   SubCutaneous three times a day before meals  insulin lispro (HumaLOG) corrective regimen sliding scale   SubCutaneous at bedtime  insulin lispro Injectable (HumaLOG) 3 Unit(s) SubCutaneous before breakfast  insulin lispro Injectable (HumaLOG) 3 Unit(s) SubCutaneous before lunch  insulin lispro Injectable (HumaLOG) 3 Unit(s) SubCutaneous before dinner  metoprolol succinate ER 50 milliGRAM(s) Oral daily  sevelamer carbonate 800 milliGRAM(s) Oral three times a day with meals    PRN Inpatient Medications  acetaminophen   Tablet .. 650 milliGRAM(s) Oral every 6 hours PRN  dextrose 40% Gel 15 Gram(s) Oral once PRN  glucagon  Injectable 1 milliGRAM(s) IntraMuscular once PRN  oxyCODONE    5 mG/acetaminophen 325 mG 1 Tablet(s) Oral every 6 hours PRN      REVIEW OF SYSTEMS  --------------------------------------------------------------------------------  Gen: No  fevers/chills  + HA   Skin: + facial bleeding   Head/Eyes/Ears/Mouth: No headache; Normal hearing;  No sinus pain/discomfort, sore throat  Respiratory: No dyspnea, cough, wheezing, hemoptysis  CV: No chest pain, or palp   GI: No abdominal pain, diarrhea, nausea, vomiting  : No dysuria  MSK: + many  joint pain/swelling; no back pain  Neuro: No dizziness/lightheadedness,  also with no edema     All other systems were reviewed and are negative, except as noted.    VITALS/PHYSICAL EXAM  --------------------------------------------------------------------------------  T(C): 35.3 (04-18-19 @ 12:57), Max: 36.6 (04-18-19 @ 09:48)  HR: 75 (04-18-19 @ 12:57) (66 - 75)  BP: 137/71 (04-18-19 @ 12:57) (137/71 - 161/78)  RR: 19 (04-18-19 @ 12:57) (18 - 22)  SpO2: 97% (04-18-19 @ 12:57) (97% - 100%)  Wt(kg): --  Height (cm): 162.56 (04-18-19 @ 09:32)  Weight (kg): 53.5 (04-18-19 @ 09:32)  BMI (kg/m2): 20.2 (04-18-19 @ 09:32)  BSA (m2): 1.56 (04-18-19 @ 09:32)      Physical Exam:  	Gen: in pain when seen + facial abrasion   	no jvd ,  	Pulm: decrease bs  no rales or ronchi or wheezing  	CV: RRR, S1S2; no rub  	Abd: +BS, soft, nontender/nondistended  	: No suprapubic tenderness  	UE: Warm, no cyanosis  no clubbing,  no edema  	LE: Warm, no cyanosis  no clubbing, 2+ pitting  edema  	Neuro: alert and oriented. speech coherent   	    LABS/STUDIES  --------------------------------------------------------------------------------              12.0   6.2   >-----------<  292      [04-18-19 @ 10:08]              37.2     135  |  92  |  47  ----------------------------<  248      [04-18-19 @ 13:13]  6.0   |  24  |  5.72        Ca     10.1     [04-18-19 @ 13:13]    TPro  6.1  /  Alb  3.9  /  TBili  0.2  /  DBili  x   /  AST  19  /  ALT  11  /  AlkPhos  76  [04-18-19 @ 13:13]    PT/INR: PT 10.8 , INR 0.94       [04-18-19 @ 10:08]  PTT: 30.9       [04-18-19 @ 10:08]      Creatinine Trend:  SCr 5.72 [04-18 @ 13:13]  SCr 5.52 [04-18 @ 10:08]  SCr 3.65 [04-04 @ 06:26]  SCr 3.60 [04-04 @ 00:16]  SCr 4.94 [04-03 @ 06:47]    Urinalysis - [06-04-17 @ 08:24]      Color Yellow / Appearance Clear / SG 1.013 / pH 8.5      Gluc 1000 / Ketone Negative  / Bili Negative / Urobili Negative       Blood Negative / Protein >600 / Leuk Est Small / Nitrite Negative      RBC 3-5 / WBC 6-10 / Hyaline  / Gran  / Sq Epi  / Non Sq Epi OCC / Bacteria       PTH -- (Ca 7.8)      [03-29-19 @ 20:38]   73  PTH -- (Ca 7.3)      [02-22-19 @ 02:49]   59  HbA1c 7.9      [02-27-19 @ 11:08]  TSH 0.71      [11-17-18 @ 08:25]  Lipid: chol 113, TG 86, HDL 59, LDL 37      [04-30-18 @ 06:44]    HBsAb <3.0      [03-26-19 @ 16:04]  HBsAb Nonreact      [03-26-19 @ 16:04]  HBsAg Nonreact      [03-26-19 @ 16:04]  HBcAb Nonreact      [03-26-19 @ 16:04]  HCV 0.09, Nonreact      [03-26-19 @ 16:04]

## 2019-04-18 NOTE — ED PROVIDER NOTE - OBJECTIVE STATEMENT
62yo female PMH CS, Coronary Artery Disease, congestive heart failure, COPD, ESRD on HD Tu/Th/Sa, hyperlipidemia, presenting with nose pain s/p mechanical fall. Patient was getting ready to go to HD today, and tripped and fell onto face, no loss of consciousness, hit chest on floor. On Plavix. NO other injury.

## 2019-04-18 NOTE — ED ADULT TRIAGE NOTE - ESI TRIAGE ACUITY LEVEL, MLM
I am calling regarding your follow up visit with Gricelda Jones PA-C  scheduled for:  9/7/18    This appointment is intended as your follow up for the MRI.    Our records indicate that you have not completed this test within [facility].   Have you received the test outside of [facility]? NO.    Attempted to contact patient regarding MRI follow up sched 9/7/18. Unable to leave voice message.    Program status: Continue in [facility]           2

## 2019-04-19 ENCOUNTER — TRANSCRIPTION ENCOUNTER (OUTPATIENT)
Age: 64
End: 2019-04-19

## 2019-04-19 DIAGNOSIS — E10.65 TYPE 1 DIABETES MELLITUS WITH HYPERGLYCEMIA: ICD-10-CM

## 2019-04-19 DIAGNOSIS — E78.5 HYPERLIPIDEMIA, UNSPECIFIED: ICD-10-CM

## 2019-04-19 DIAGNOSIS — I10 ESSENTIAL (PRIMARY) HYPERTENSION: ICD-10-CM

## 2019-04-19 LAB
ANION GAP SERPL CALC-SCNC: 14 MMOL/L — SIGNIFICANT CHANGE UP (ref 5–17)
BUN SERPL-MCNC: 20 MG/DL — SIGNIFICANT CHANGE UP (ref 7–23)
CALCIUM SERPL-MCNC: 8.8 MG/DL — SIGNIFICANT CHANGE UP (ref 8.4–10.5)
CHLORIDE SERPL-SCNC: 91 MMOL/L — LOW (ref 96–108)
CO2 SERPL-SCNC: 26 MMOL/L — SIGNIFICANT CHANGE UP (ref 22–31)
CREAT SERPL-MCNC: 3.49 MG/DL — HIGH (ref 0.5–1.3)
GLUCOSE BLDC GLUCOMTR-MCNC: 165 MG/DL — HIGH (ref 70–99)
GLUCOSE BLDC GLUCOMTR-MCNC: 225 MG/DL — HIGH (ref 70–99)
GLUCOSE BLDC GLUCOMTR-MCNC: 265 MG/DL — HIGH (ref 70–99)
GLUCOSE BLDC GLUCOMTR-MCNC: 466 MG/DL — CRITICAL HIGH (ref 70–99)
GLUCOSE BLDC GLUCOMTR-MCNC: 478 MG/DL — CRITICAL HIGH (ref 70–99)
GLUCOSE SERPL-MCNC: 471 MG/DL — CRITICAL HIGH (ref 70–99)
HBV SURFACE AB SER-ACNC: <3 MIU/ML — LOW
HBV SURFACE AG SER-ACNC: SIGNIFICANT CHANGE UP
HCV AB S/CO SERPL IA: 0.11 S/CO — SIGNIFICANT CHANGE UP (ref 0–0.99)
HCV AB SERPL-IMP: SIGNIFICANT CHANGE UP
POTASSIUM SERPL-MCNC: 5.1 MMOL/L — SIGNIFICANT CHANGE UP (ref 3.5–5.3)
POTASSIUM SERPL-SCNC: 5.1 MMOL/L — SIGNIFICANT CHANGE UP (ref 3.5–5.3)
SODIUM SERPL-SCNC: 131 MMOL/L — LOW (ref 135–145)

## 2019-04-19 PROCEDURE — 99221 1ST HOSP IP/OBS SF/LOW 40: CPT

## 2019-04-19 RX ORDER — INSULIN LISPRO 100/ML
2 VIAL (ML) SUBCUTANEOUS AT BEDTIME
Qty: 0 | Refills: 0 | Status: DISCONTINUED | OUTPATIENT
Start: 2019-04-19 | End: 2019-04-20

## 2019-04-19 RX ADMIN — CLOPIDOGREL BISULFATE 75 MILLIGRAM(S): 75 TABLET, FILM COATED ORAL at 12:32

## 2019-04-19 RX ADMIN — OXYCODONE AND ACETAMINOPHEN 1 TABLET(S): 5; 325 TABLET ORAL at 06:58

## 2019-04-19 RX ADMIN — DULOXETINE HYDROCHLORIDE 60 MILLIGRAM(S): 30 CAPSULE, DELAYED RELEASE ORAL at 12:32

## 2019-04-19 RX ADMIN — SEVELAMER CARBONATE 800 MILLIGRAM(S): 2400 POWDER, FOR SUSPENSION ORAL at 13:41

## 2019-04-19 RX ADMIN — OXYCODONE AND ACETAMINOPHEN 1 TABLET(S): 5; 325 TABLET ORAL at 00:10

## 2019-04-19 RX ADMIN — Medication 81 MILLIGRAM(S): at 12:32

## 2019-04-19 RX ADMIN — INSULIN GLARGINE 4 UNIT(S): 100 INJECTION, SOLUTION SUBCUTANEOUS at 22:26

## 2019-04-19 RX ADMIN — Medication 3 UNIT(S): at 13:37

## 2019-04-19 RX ADMIN — Medication 6: at 08:51

## 2019-04-19 RX ADMIN — Medication 2: at 18:22

## 2019-04-19 RX ADMIN — SEVELAMER CARBONATE 800 MILLIGRAM(S): 2400 POWDER, FOR SUSPENSION ORAL at 18:29

## 2019-04-19 RX ADMIN — Medication 1: at 22:26

## 2019-04-19 RX ADMIN — Medication 3 MILLILITER(S): at 12:32

## 2019-04-19 RX ADMIN — Medication 1: at 13:36

## 2019-04-19 RX ADMIN — SEVELAMER CARBONATE 800 MILLIGRAM(S): 2400 POWDER, FOR SUSPENSION ORAL at 08:52

## 2019-04-19 RX ADMIN — Medication 3 UNIT(S): at 08:54

## 2019-04-19 RX ADMIN — ATORVASTATIN CALCIUM 20 MILLIGRAM(S): 80 TABLET, FILM COATED ORAL at 21:36

## 2019-04-19 RX ADMIN — Medication 50 MILLIGRAM(S): at 05:20

## 2019-04-19 RX ADMIN — Medication 3 UNIT(S): at 18:23

## 2019-04-19 NOTE — PROGRESS NOTE ADULT - SUBJECTIVE AND OBJECTIVE BOX
Patient is a 63y old  Female who presents with a chief complaint of fall (19 Apr 2019 15:35)      SUBJECTIVE / OVERNIGHT EVENTS: Comfortable without new complaints.   Review of Systems  chest pain no  palpitations no  sob no  nausea no  headache no    MEDICATIONS  (STANDING):  ALBUTerol/ipratropium for Nebulization 3 milliLiter(s) Nebulizer every 6 hours  aspirin enteric coated 81 milliGRAM(s) Oral daily  atorvastatin 20 milliGRAM(s) Oral at bedtime  clopidogrel Tablet 75 milliGRAM(s) Oral daily  dextrose 5%. 1000 milliLiter(s) (50 mL/Hr) IV Continuous <Continuous>  dextrose 50% Injectable 12.5 Gram(s) IV Push once  dextrose 50% Injectable 25 Gram(s) IV Push once  dextrose 50% Injectable 25 Gram(s) IV Push once  DULoxetine 60 milliGRAM(s) Oral daily  heparin  Injectable 5000 Unit(s) SubCutaneous every 12 hours  influenza   Vaccine 0.5 milliLiter(s) IntraMuscular once  insulin glargine Injectable (LANTUS) 4 Unit(s) SubCutaneous at bedtime  insulin lispro (HumaLOG) corrective regimen sliding scale   SubCutaneous three times a day before meals  insulin lispro (HumaLOG) corrective regimen sliding scale   SubCutaneous at bedtime  insulin lispro Injectable (HumaLOG) 3 Unit(s) SubCutaneous before breakfast  insulin lispro Injectable (HumaLOG) 3 Unit(s) SubCutaneous before lunch  insulin lispro Injectable (HumaLOG) 3 Unit(s) SubCutaneous before dinner  metoprolol succinate ER 50 milliGRAM(s) Oral daily  sevelamer carbonate 800 milliGRAM(s) Oral three times a day with meals    MEDICATIONS  (PRN):  acetaminophen   Tablet .. 650 milliGRAM(s) Oral every 6 hours PRN Temp greater or equal to 38.5C (101.3F), Mild Pain (1 - 3)  dextrose 40% Gel 15 Gram(s) Oral once PRN Blood Glucose LESS THAN 70 milliGRAM(s)/deciliter  glucagon  Injectable 1 milliGRAM(s) IntraMuscular once PRN Glucose LESS THAN 70 milligrams/deciliter  insulin lispro Injectable (HumaLOG) 2 Unit(s) SubCutaneous at bedtime PRN prn bedtime snack  oxyCODONE    5 mG/acetaminophen 325 mG 1 Tablet(s) Oral every 6 hours PRN Moderate Pain (4 - 6)      Vital Signs Last 24 Hrs  T(C): 36.6 (19 Apr 2019 11:39), Max: 36.7 (18 Apr 2019 20:53)  T(F): 97.8 (19 Apr 2019 11:39), Max: 98.1 (19 Apr 2019 04:04)  HR: 78 (19 Apr 2019 11:39) (77 - 80)  BP: 160/73 (19 Apr 2019 11:39) (147/72 - 160/73)  BP(mean): --  RR: 18 (19 Apr 2019 11:39) (18 - 20)  SpO2: 97% (19 Apr 2019 11:39) (97% - 100%)    PHYSICAL EXAM:  GENERAL: NAD  HEAD:  s/p L frontal ecchymosis, Normocephalic  EYES: EOMI, PERRLA, conjunctiva and sclera clear  NECK: Supple, No JVD  CHEST/LUNG: Clear to auscultation bilaterally; No wheeze  HEART: Regular rate and rhythm; No murmurs, rubs, or gallops  ABDOMEN: Soft, Nontender, Nondistended; Bowel sounds present  EXTREMITIES:  2+ Peripheral Pulses, No clubbing, cyanosis, or edema  PSYCH: Anxious  NEUROLOGY: non-focal  SKIN: No rashes or lesions    LABS:                        12.0   6.2   )-----------( 292      ( 18 Apr 2019 10:08 )             37.2     04-19    131<L>  |  91<L>  |  20  ----------------------------<  471<HH>  5.1   |  26  |  3.49<H>    Ca    8.8      19 Apr 2019 06:35    TPro  6.1  /  Alb  3.9  /  TBili  0.2  /  DBili  x   /  AST  19  /  ALT  11  /  AlkPhos  76  04-18    PT/INR - ( 18 Apr 2019 10:08 )   PT: 10.8 sec;   INR: 0.94 ratio         PTT - ( 18 Apr 2019 10:08 )  PTT:30.9 sec            RADIOLOGY & ADDITIONAL TESTS:    Imaging Personally Reviewed:    Consultant(s) Notes Reviewed:      Care Discussed with Consultants/Other Providers:

## 2019-04-19 NOTE — DISCHARGE NOTE PROVIDER - NSDCFUADDAPPT_GEN_ALL_CORE_FT
Follow up with  (Endocrine) in 1 week, per endocrine may start back insulin pump outpatient   Follow up with  (pulmonology) for Mediastinal lymphadenopathy in 2 weeks Follow up with  (Endocrine) Monday/ tuesday (4/22/-4/23), per endocrine to start back insulin pump outpatient   Follow up with  (pulmonology) for Mediastinal lymphadenopathy in 2 weeks

## 2019-04-19 NOTE — CONSULT NOTE ADULT - ASSESSMENT
63 year old woman with uncontrolled DM1, CAD, CHF, ESRD on HD, HTN, HLD, here with mechanical fall from home, also with hyperglycemia in setting of uncontrolled DM1 and dietary indiscretion. Goal -200 mg/dL.
62yo female PMH CS, Coronary Artery Disease, congestive heart failure, COPD, ESRD on HD Tu/Th/Sa, hyperlipidemia, presenting with nose pain s/p mechanical fall. also hyperkalemia    1- hyperkalemia  2- esrd  3- pulm edema  4- htn    hd consent obtained witnessed and placed in chart  hd today   fluid removal with hd   trend bp  shpt cont renvela with meals

## 2019-04-19 NOTE — CONSULT NOTE ADULT - ATTENDING COMMENTS
63 year old woman with uncontrolled DM1, CAD, CHF, ESRD on HD, HTN, HLD, here with mechanical fall from home, also with hyperglycemia in setting of uncontrolled DM1 and dietary indiscretion.   Patient is well known to our service.  Had numerous admission for DKAs in the past, last hospitalization, the decision was for patient to be discharged on basal bolus regimen.  Patient and , however, both stated that she has recurrent hypoglycemic episode when she is using the insulin pen regimens, with reporting glucose of 20s mg/dl overnight.  Does not have a DEXCOM sensor.   reports he feels frustrated with the recurrent hypoglycemic episodes she has been having.  They were suppose to have an appointment with outpatient endo, Dr. Ley to discuss being placed back on insulin pump today but is currently admitted.  Given her recurrent episodes of DKAs while on insulin pump, vision problems, will need to have thorough conversions with both patient and her care taker regarding the risks and benefits of insulin basal bolus regimen vs. insulin pump.

## 2019-04-19 NOTE — PHYSICAL THERAPY INITIAL EVALUATION ADULT - ADDITIONAL COMMENTS
Pt lives in a private home with 1 step to enter and full flight inside home to bedroom. Pt lives with  and children. Prior to admission pt was independent with all functional mobility with use of SC.

## 2019-04-19 NOTE — DISCHARGE NOTE PROVIDER - CARE PROVIDER_API CALL
Braden Thompson (DO)  Internal Medicine; Pulmonary Disease  [address] [facility], [address]  [place] [address]  Phone: [phone]  Fax: [phone]  Follow Up Time: 2 weeks    Daisy Burger (DO)  Nephrology  [address] [facility] [address]  [place] [address]  Phone: [phone]  Fax: [phone]  Follow Up Time: 2 weeks    Arsalan Castro)  Internal Medicine  [address] [place] [address]  Phone: [phone]  Fax: [phone]  Follow Up Time: 1 week    Pina Ley)  EndocrinologyMetabDiabetes  [address] [place] [address]  Phone: [phone]  Fax: [phone]  Follow Up Time: 1 week

## 2019-04-19 NOTE — CONSULT NOTE ADULT - PROBLEM SELECTOR RECOMMENDATION 9
- recommend c/w Lantus 4 units qhs  - c/w Humalog 3 units TID  - hyperglycemia this AM due to overnight snacking - will add humalog 2 units prn bedtime snack  - consistent carb diet  - will follow  - for discharge: d/c on home doses of insulin (Levemir 4 units qhs and Humalog 3 units TID). Outpatient follow up with Dr. Ley. - recommend c/w Lantus 4 units qhs  - c/w Humalog 3 units TID  - hyperglycemia this AM due to overnight snacking - will add humalog 2 units prn bedtime snack  - consistent carb diet  - will follow  - for discharge: d/c on home doses of insulin (Levemir 4 units qhs and Humalog 3 units TID). Outpatient follow up with Dr. Ley. May be restarted on insulin pump as outpatient.

## 2019-04-19 NOTE — DISCHARGE NOTE PROVIDER - PROVIDER TOKENS
PROVIDER:[TOKEN:[[id number]],FOLLOWUP:[2 weeks]],PROVIDER:[TOKEN:[[id number]],FOLLOWUP:[2 weeks]],PROVIDER:[TOKEN:[[id number]],FOLLOWUP:[1 week]],PROVIDER:[TOKEN:[[id number]],FOLLOWUP:[1 week]]

## 2019-04-19 NOTE — PROGRESS NOTE ADULT - SUBJECTIVE AND OBJECTIVE BOX
[facility]   [phone]  RENAL FOLLOW UP NOTE  --------------------------------------------------------------------------------  Chief Complaint:    24 hour events/subjective:    seen earlier. states feels better. no sob no HA    PAST HISTORY  --------------------------------------------------------------------------------  No significant changes to PMH, PSH, FHx, SHx, unless otherwise noted    ALLERGIES & MEDICATIONS  --------------------------------------------------------------------------------  Allergies    No Known Allergies    Intolerances      Standing Inpatient Medications  ALBUTerol/ipratropium for Nebulization 3 milliLiter(s) Nebulizer every 6 hours  aspirin enteric coated 81 milliGRAM(s) Oral daily  atorvastatin 20 milliGRAM(s) Oral at bedtime  clopidogrel Tablet 75 milliGRAM(s) Oral daily  dextrose 5%. 1000 milliLiter(s) IV Continuous <Continuous>  dextrose 50% Injectable 12.5 Gram(s) IV Push once  dextrose 50% Injectable 25 Gram(s) IV Push once  dextrose 50% Injectable 25 Gram(s) IV Push once  DULoxetine 60 milliGRAM(s) Oral daily  heparin  Injectable 5000 Unit(s) SubCutaneous every 12 hours  influenza   Vaccine 0.5 milliLiter(s) IntraMuscular once  insulin glargine Injectable (LANTUS) 4 Unit(s) SubCutaneous at bedtime  insulin lispro (HumaLOG) corrective regimen sliding scale   SubCutaneous three times a day before meals  insulin lispro (HumaLOG) corrective regimen sliding scale   SubCutaneous at bedtime  insulin lispro Injectable (HumaLOG) 3 Unit(s) SubCutaneous before breakfast  insulin lispro Injectable (HumaLOG) 3 Unit(s) SubCutaneous before lunch  insulin lispro Injectable (HumaLOG) 3 Unit(s) SubCutaneous before dinner  metoprolol succinate ER 50 milliGRAM(s) Oral daily  sevelamer carbonate 800 milliGRAM(s) Oral three times a day with meals    PRN Inpatient Medications  acetaminophen   Tablet .. 650 milliGRAM(s) Oral every 6 hours PRN  dextrose 40% Gel 15 Gram(s) Oral once PRN  glucagon  Injectable 1 milliGRAM(s) IntraMuscular once PRN  oxyCODONE    5 mG/acetaminophen 325 mG 1 Tablet(s) Oral every 6 hours PRN      REVIEW OF SYSTEMS  --------------------------------------------------------------------------------    Gen: denies  fevers/chills,  CVS: denies chest pain/palpitations  Resp: denies SOB/Cough  GI: Denies N/V/Abd pain  : Denies dysuria/oliguria/hematuria    All other systems were reviewed and are negative, except as noted.    VITALS/PHYSICAL EXAM  --------------------------------------------------------------------------------  T(C): 36.6 (04-19-19 @ 11:39), Max: 36.7 (04-18-19 @ 20:53)  HR: 78 (04-19-19 @ 11:39) (76 - 80)  BP: 160/73 (04-19-19 @ 11:39) (137/67 - 160/73)  RR: 18 (04-19-19 @ 11:39) (18 - 21)  SpO2: 97% (04-19-19 @ 11:39) (96% - 100%)  Wt(kg): --  Height (cm): 162.56 (04-18-19 @ 20:53)  Weight (kg): 53.2 (04-18-19 @ 20:53)  BMI (kg/m2): 20.1 (04-18-19 @ 20:53)  BSA (m2): 1.56 (04-18-19 @ 20:53)      04-18-19 @ 07:01  -  04-19-19 @ 07:00  --------------------------------------------------------  IN: 480 mL / OUT: 2501 mL / NET: -2021 mL    04-19-19 @ 07:01  -  04-19-19 @ 14:50  --------------------------------------------------------  IN: 480 mL / OUT: 0 mL / NET: 480 mL      Physical Exam:  	  	Gen: in pain when seen + facial abrasion   	no jvd ,  	Pulm: decrease bs  no rales or ronchi or wheezing  	CV: RRR, S1S2; no rub  	Abd: +BS, soft, nontender/nondistended  	: No suprapubic tenderness  	UE: Warm, no cyanosis  no clubbing,  no edema  	LE: Warm, no cyanosis  no clubbing,  LLE 2+ pitting  edema  	Neuro: alert and oriented. speech coherent   	  	      LABS/STUDIES  --------------------------------------------------------------------------------              12.0   6.2   >-----------<  292      [04-18-19 @ 10:08]              37.2     131  |  91  |  20  ----------------------------<  471      [04-19-19 @ 06:35]  5.1   |  26  |  3.49        Ca     8.8     [04-19-19 @ 06:35]    TPro  6.1  /  Alb  3.9  /  TBili  0.2  /  DBili  x   /  AST  19  /  ALT  11  /  AlkPhos  76  [04-18-19 @ 13:13]    PT/INR: PT 10.8 , INR 0.94       [04-18-19 @ 10:08]  PTT: 30.9       [04-18-19 @ 10:08]      Creatinine Trend:  SCr 3.49 [04-19 @ 06:35]  SCr 2.50 [04-18 @ 17:55]  SCr 5.72 [04-18 @ 13:13]  SCr 5.52 [04-18 @ 10:08]  SCr 3.65 [04-04 @ 06:26]                  PTH -- (Ca 7.8)      [03-29-19 @ 20:38]   73  PTH -- (Ca 7.3)      [02-22-19 @ 02:49]   59  HbA1c 7.9      [02-27-19 @ 11:08]  TSH 0.71      [11-17-18 @ 08:25]  Lipid: chol 113, TG 86, HDL 59, LDL 37      [04-30-18 @ 06:44]

## 2019-04-19 NOTE — DISCHARGE NOTE PROVIDER - NSDCCPCAREPLAN_GEN_ALL_CORE_FT
PRINCIPAL DISCHARGE DIAGNOSIS  Diagnosis: Head trauma, initial encounter  Assessment and Plan of Treatment:       SECONDARY DISCHARGE DIAGNOSES  Diagnosis: Type 1 diabetes mellitus without complication  Assessment and Plan of Treatment:     Diagnosis: ESRD (end stage renal disease) on dialysis  Assessment and Plan of Treatment:     Diagnosis: Hyperkalemia  Assessment and Plan of Treatment: PRINCIPAL DISCHARGE DIAGNOSIS  Diagnosis: Head trauma, initial encounter  Assessment and Plan of Treatment:       SECONDARY DISCHARGE DIAGNOSES  Diagnosis: Type 1 diabetes mellitus without complication  Assessment and Plan of Treatment: Take medication as prescribed  Follow up with your endocronologist    Diagnosis: ESRD (end stage renal disease) on dialysis  Assessment and Plan of Treatment: Follow up with dialysis    Diagnosis: Hyperkalemia  Assessment and Plan of Treatment:

## 2019-04-19 NOTE — PHYSICAL THERAPY INITIAL EVALUATION ADULT - PERTINENT HX OF CURRENT PROBLEM, REHAB EVAL
64y/o F PMH CS, Coronary Artery Disease, congestive heart failure, COPD, ESRD on HD Tu/Th/Sa, hyperlipidemia, presenting with nose pain s/p mechanical fall. Patient was getting ready to go to HD today, and tripped and fell onto face, no loss of consciousness, hit chest on floor. On Plavix. CTChest: Findings suggestive of mild pulmonary edema. Decrease in size of the the mediastinal adenopathy when compared to previous exam. CTHead/Maxillofacial (-)

## 2019-04-19 NOTE — DISCHARGE NOTE PROVIDER - HOSPITAL COURSE
64yo female PMH CS, Coronary Artery Disease, congestive heart failure, COPD, ESRD on HD Tu/Th/Sa, hyperlipidemia, presenting with nose pain s/p mechanical fall,  hyperkalemia, pulm edema Admited to medicine by ED. esrd on HD, likely cause of hyperkalemia and edema, resolved post dialysis. Has had episodes of hyperglycemia in 400's , insulin adjusted by endo

## 2019-04-19 NOTE — CONSULT NOTE ADULT - PROBLEM SELECTOR RECOMMENDATION 2
- BP elevated, on Metoprolol only.  - Goal BP is less that 130/80  - defer management of HTN to renal

## 2019-04-19 NOTE — PROGRESS NOTE ADULT - ASSESSMENT
63 f with  s/p Fall  - pain control  - wound care    ESRD  - HD  - Nephrology follow Dr. Schmidt    Diabetes control  - endocrine evaluation     ACS (acute coronary syndrome) 1/2015 - cath revealed 100% ostial stenosis not amenable to PCI - medical management    CAD (coronary artery disease) s/p stents  - continue Rx    CHF (congestive heart failure)  - HD, continue Rx    COPD (chronic obstructive pulmonary disease)   - stable, nebs prn    DCP home after HD if stable.  Pierce Viera MD pager [phone]

## 2019-04-19 NOTE — PROGRESS NOTE ADULT - SUBJECTIVE AND OBJECTIVE BOX
PULMONARY PROGRESS NOTE    NATHAN MEEKS  MRN-[id number]    Patient is a 63y old  Female who presents with a chief complaint of fall (18 Apr 2019 17:16)      HPI:  -mechanical fall at home, ct ok, had HD yesterday, no dyspnea/cough/cp, wants to go home.    ACTIVE MEDICATIONS:  MEDICATIONS  (STANDING):  ALBUTerol/ipratropium for Nebulization 3 milliLiter(s) Nebulizer every 6 hours  aspirin enteric coated 81 milliGRAM(s) Oral daily  atorvastatin 20 milliGRAM(s) Oral at bedtime  clopidogrel Tablet 75 milliGRAM(s) Oral daily  dextrose 5%. 1000 milliLiter(s) (50 mL/Hr) IV Continuous <Continuous>  dextrose 50% Injectable 12.5 Gram(s) IV Push once  dextrose 50% Injectable 25 Gram(s) IV Push once  dextrose 50% Injectable 25 Gram(s) IV Push once  DULoxetine 60 milliGRAM(s) Oral daily  heparin  Injectable 5000 Unit(s) SubCutaneous every 12 hours  influenza   Vaccine 0.5 milliLiter(s) IntraMuscular once  insulin glargine Injectable (LANTUS) 4 Unit(s) SubCutaneous at bedtime  insulin lispro (HumaLOG) corrective regimen sliding scale   SubCutaneous three times a day before meals  insulin lispro (HumaLOG) corrective regimen sliding scale   SubCutaneous at bedtime  insulin lispro Injectable (HumaLOG) 3 Unit(s) SubCutaneous before breakfast  insulin lispro Injectable (HumaLOG) 3 Unit(s) SubCutaneous before lunch  insulin lispro Injectable (HumaLOG) 3 Unit(s) SubCutaneous before dinner  metoprolol succinate ER 50 milliGRAM(s) Oral daily  sevelamer carbonate 800 milliGRAM(s) Oral three times a day with meals    MEDICATIONS  (PRN):  acetaminophen   Tablet .. 650 milliGRAM(s) Oral every 6 hours PRN Temp greater or equal to 38.5C (101.3F), Mild Pain (1 - 3)  dextrose 40% Gel 15 Gram(s) Oral once PRN Blood Glucose LESS THAN 70 milliGRAM(s)/deciliter  glucagon  Injectable 1 milliGRAM(s) IntraMuscular once PRN Glucose LESS THAN 70 milligrams/deciliter  oxyCODONE    5 mG/acetaminophen 325 mG 1 Tablet(s) Oral every 6 hours PRN Moderate Pain (4 - 6)      EXAM:  Vital Signs Last 24 Hrs  T(C): 36.7 (19 Apr 2019 04:04), Max: 36.9 (18 Apr 2019 12:57)  T(F): 98.1 (19 Apr 2019 04:04), Max: 98.5 (18 Apr 2019 12:57)  HR: 77 (19 Apr 2019 04:04) (66 - 80)  BP: 159/81 (19 Apr 2019 04:04) (137/67 - 159/81)  BP(mean): --  RR: 18 (19 Apr 2019 04:04) (18 - 22)  SpO2: 98% (19 Apr 2019 04:04) (96% - 100%)    LUNGS: Clear to auscultation without wheezing, rales or rhonchi; respirations unlabored    LABS/IMAGING: reviewed                        12.0   6.2   )-----------( 292      ( 18 Apr 2019 10:08 )             37.2   04-19    131<L>  |  91<L>  |  20  ----------------------------<  471<HH>  5.1   |  26  |  3.49<H>    Ca    8.8      19 Apr 2019 06:35    TPro  6.1  /  Alb  3.9  /  TBili  0.2  /  DBili  x   /  AST  19  /  ALT  11  /  AlkPhos  76  04-18      < from: CT Chest No Cont (04.18.19 @ 10:14) >    Impression: Findings suggestive of mild pulmonary edema.    Decrease in size of the the mediastinal adenopathy when compared to   previous exam.    < end of copied text >      PROBLEM LIST:  63y Female with HEALTH ISSUES - PROBLEM Dx:  mechanical fall  ESRD on HD  DM    RECS:  -No pulmonary contraindication to dc home  -mediastinal lymphadenopathy improving, pt feeling well  -fu with  after discharge.    Alem Tate MD  [phone]

## 2019-04-19 NOTE — PROGRESS NOTE ADULT - ASSESSMENT
62yo female PMH CS, Coronary Artery Disease, congestive heart failure, COPD, ESRD on HD Tu/Th/Sa, hyperlipidemia, presenting with nose pain s/p mechanical fall. also hyperkalemia    1- hyperkalemia  2- esrd  3- pulm edema  4- htn    HD am   k improved after hd   fluid status imprving  shpt pth was low. cont to hold sensipar   hyperlipidemia cont lipitor 40 mg daily

## 2019-04-19 NOTE — CONSULT NOTE ADULT - SUBJECTIVE AND OBJECTIVE BOX
HPI:  63 year old woman with uncontrolled DM1, CAD, CHF, ESRD on HD, HTN, HLD, here with mechanical fall from home. Consult called for management of DM1.    Endocrine History:  Patient is well known to our service as she has had frequent admissions in the past for DKA. Was previously on an insulin pump but could not manage the pump and was taken off the pump. Now on basal bolus insulin. Takes Levemir 3-4 units at bedtime and Humalog 2-4 units before meals, based on how much she eats. She states that she uses a sensor now. No longer has hypoglycemia. AM BG in low 100's. Pre-lunch FS in the 190's. Pre-diner FS in the 180's. Bedtime BG as high as 200. Her appetite has improved and she is eating more these days. She was diagnosed with Type 1 DM at age 20 and has neuropathy, retinopathy, ESRD on HD with CAD s/p PCI. Patient follows with endocrinologist Dr Ley, next appt is next Monday.     AM BG this morning in the 400's, not in DKA. She ate crackers in the middle of the night.       PAST MEDICAL & SURGICAL HISTORY:  COPD (chronic obstructive pulmonary disease)  Localized enlarged lymph nodes  CHF (congestive heart failure): EF 40-45%  Subclavian vein stenosis, left: s/p stent  DKA, type 1: 1/2015  ACS (acute coronary syndrome): 1/2015 - cath revealed 100% ostial stenosis not amenable to PCI - medical management  TIA (transient ischemic attack): x 2 - 8-9 years ago prior to ASD/VSD repair  CAD (coronary artery disease): s/p stents  Gout: past  CVA (cerebral infarction): with no residual, 8 yrs ago, prior to heart surgery - ST memory loss  Peripheral vascular disease: occluded left fem-pop bypass 5/2015  Diabetes mellitus type 1: Insulin Dependent -  ESRD (end stage renal disease): dialysis  M, tue, thursday, saturday  Hyperlipidemia  Status post device closure of ASD: &quot;clamshell&quot;  History of cardiac catheterization: 1/2015 - no intervention  S/P femoral-popliteal bypass surgery: L and R in 2013 with graft; 5/2015 CFA angioplasty left and ileofemoral endarterectomywith vein patch angioplasty of left fem-pop bypass graft  Multiple vascular surgery both leg, left fempop bypass revision 11/2015  AV (arteriovenous fistula): Left AV graft; revision with stent placement 2-3 years ago  S/P cholecystectomy      FAMILY HISTORY:  Family history of smoking  Family history of hypertension  Family history of cancer (Sibling)      Social History:    Outpatient Medications:    MEDICATIONS  (STANDING):  ALBUTerol/ipratropium for Nebulization 3 milliLiter(s) Nebulizer every 6 hours  aspirin enteric coated 81 milliGRAM(s) Oral daily  atorvastatin 20 milliGRAM(s) Oral at bedtime  clopidogrel Tablet 75 milliGRAM(s) Oral daily  dextrose 5%. 1000 milliLiter(s) (50 mL/Hr) IV Continuous <Continuous>  dextrose 50% Injectable 12.5 Gram(s) IV Push once  dextrose 50% Injectable 25 Gram(s) IV Push once  dextrose 50% Injectable 25 Gram(s) IV Push once  DULoxetine 60 milliGRAM(s) Oral daily  heparin  Injectable 5000 Unit(s) SubCutaneous every 12 hours  influenza   Vaccine 0.5 milliLiter(s) IntraMuscular once  insulin glargine Injectable (LANTUS) 4 Unit(s) SubCutaneous at bedtime  insulin lispro (HumaLOG) corrective regimen sliding scale   SubCutaneous three times a day before meals  insulin lispro (HumaLOG) corrective regimen sliding scale   SubCutaneous at bedtime  insulin lispro Injectable (HumaLOG) 3 Unit(s) SubCutaneous before breakfast  insulin lispro Injectable (HumaLOG) 3 Unit(s) SubCutaneous before lunch  insulin lispro Injectable (HumaLOG) 3 Unit(s) SubCutaneous before dinner  metoprolol succinate ER 50 milliGRAM(s) Oral daily  sevelamer carbonate 800 milliGRAM(s) Oral three times a day with meals    MEDICATIONS  (PRN):  acetaminophen   Tablet .. 650 milliGRAM(s) Oral every 6 hours PRN Temp greater or equal to 38.5C (101.3F), Mild Pain (1 - 3)  dextrose 40% Gel 15 Gram(s) Oral once PRN Blood Glucose LESS THAN 70 milliGRAM(s)/deciliter  glucagon  Injectable 1 milliGRAM(s) IntraMuscular once PRN Glucose LESS THAN 70 milligrams/deciliter  oxyCODONE    5 mG/acetaminophen 325 mG 1 Tablet(s) Oral every 6 hours PRN Moderate Pain (4 - 6)      Allergies  No Known Allergies    Review of Systems:  Constitutional: No fever  Eyes: + blurry vision  Neuro: No tremors  HEENT: No pain  Cardiovascular: No chest pain, palpitations  Respiratory: No SOB, no cough  GI: No nausea, vomiting, abdominal pain  : No dysuria  Skin: no rash  Endocrine: no polyuria, polydipsia    ALL OTHER SYSTEMS REVIEWED AND NEGATIVE    PHYSICAL EXAM:  VITALS: T(C): 36.6 (04-19-19 @ 11:39)  T(F): 97.8 (04-19-19 @ 11:39), Max: 98.1 (04-19-19 @ 04:04)  HR: 78 (04-19-19 @ 11:39) (76 - 80)  BP: 160/73 (04-19-19 @ 11:39) (137/67 - 160/73)  RR:  (18 - 21)  SpO2:  (96% - 100%)  Wt(kg): --  GENERAL: NAD, well-developed  EYES: No proptosis, anicteric  HEENT:  Atraumatic, Normocephalic  THYROID: Normal size, no palpable nodules  RESPIRATORY: Clear to auscultation bilaterally; No rales, rhonchi, wheezing  CARDIOVASCULAR: Regular rate and rhythm; No murmurs; 1+pitting edema RLE and 2+ pitting edema LLE  GI: Soft, nontender, non distended, normal bowel sounds  SKIN: Dry, intact, No ulcers on feet; + ecchymoses on left forehead and nose  MUSCULOSKELETAL: Full range of motion, normal strength  NEURO: sensation intact, extraocular movements intact, no tremoPSYCH: Alert and oriented x 3, reactive affect      POCT Blood Glucose.: 165 mg/dL (04-19-19 @ 12:52) H 3, H 1  POCT Blood Glucose.: 466 mg/dL (04-19-19 @ 08:35)  POCT Blood Glucose.: 478 mg/dL (04-19-19 @ 08:33) H 3, H 6  POCT Blood Glucose.: 154 mg/dL (04-18-19 @ 22:00) Lantus 4  POCT Blood Glucose.: 189 mg/dL (04-18-19 @ 17:20) H 3  POCT Blood Glucose.: 148 mg/dL (04-18-19 @ 14:58)  POCT Blood Glucose.: 250 mg/dL (04-18-19 @ 12:14)  POCT Blood Glucose.: 283 mg/dL (04-18-19 @ 11:31)                          12.0   6.2   )-----------( 292      ( 18 Apr 2019 10:08 )             37.2       04-19    131<L>  |  91<L>  |  20  ----------------------------<  471<HH>  5.1   |  26  |  3.49<H>    EGFR if : 16<L>  EGFR if non : 13<L>    Ca    8.8      04-19    TPro  6.1  /  Alb  3.9  /  TBili  0.2  /  DBili  x   /  AST  19  /  ALT  11  /  AlkPhos  76  04-18      Hemoglobin A1C, Whole Blood: 7.9 % <H> [4.0 - 5.6] (02-27-19 @ 11:08)  Hemoglobin A1C, Whole Blood: 7.6 % <H> [4.0 - 5.6] (02-13-19 @ 08:42) HPI:  63 year old woman with uncontrolled DM1, CAD, CHF, ESRD on HD, HTN, HLD, here with mechanical fall from home. Consult called for management of DM1.    Endocrine History:  Patient is well known to our service as she has had frequent admissions in the past for DKA. Was previously on an insulin pump but could not manage the pump and was taken off the pump. Now on basal bolus insulin. Takes Levemir 3-4 units at bedtime and Humalog 2-4 units before meals, based on how much she eats. She states that she uses a sensor now. No longer has hypoglycemia. AM BG in low 100's. Pre-lunch FS in the 190's. Pre-diner FS in the 180's. Bedtime BG as high as 200. Her appetite has improved and she is eating more these days. She was diagnosed with Type 1 DM at age 20 and has neuropathy, retinopathy, ESRD on HD with CAD s/p PCI. Patient follows with endocrinologist Dr Ley, next appt is next Monday.     AM BG this morning in the 400's, not in DKA. She ate crackers in the middle of the night.     Spoke with patient's  - he states that patient has been having frequent hypoglycemia at home, BG as low as the 20's. He states that she is being placed back on her insulin pump as per her outpatient endocrinologist's recommendations.     PAST MEDICAL & SURGICAL HISTORY:  COPD (chronic obstructive pulmonary disease)  Localized enlarged lymph nodes  CHF (congestive heart failure): EF 40-45%  Subclavian vein stenosis, left: s/p stent  DKA, type 1: 1/2015  ACS (acute coronary syndrome): 1/2015 - cath revealed 100% ostial stenosis not amenable to PCI - medical management  TIA (transient ischemic attack): x 2 - 8-9 years ago prior to ASD/VSD repair  CAD (coronary artery disease): s/p stents  Gout: past  CVA (cerebral infarction): with no residual, 8 yrs ago, prior to heart surgery - ST memory loss  Peripheral vascular disease: occluded left fem-pop bypass 5/2015  Diabetes mellitus type 1: Insulin Dependent -  ESRD (end stage renal disease): dialysis  M, tue, thursday, saturday  Hyperlipidemia  Status post device closure of ASD: &quot;clamshell&quot;  History of cardiac catheterization: 1/2015 - no intervention  S/P femoral-popliteal bypass surgery: L and R in 2013 with graft; 5/2015 CFA angioplasty left and ileofemoral endarterectomywith vein patch angioplasty of left fem-pop bypass graft  Multiple vascular surgery both leg, left fempop bypass revision 11/2015  AV (arteriovenous fistula): Left AV graft; revision with stent placement 2-3 years ago  S/P cholecystectomy      FAMILY HISTORY:  Family history of smoking  Family history of hypertension  Family history of cancer (Sibling)      Social History:    Outpatient Medications:    MEDICATIONS  (STANDING):  ALBUTerol/ipratropium for Nebulization 3 milliLiter(s) Nebulizer every 6 hours  aspirin enteric coated 81 milliGRAM(s) Oral daily  atorvastatin 20 milliGRAM(s) Oral at bedtime  clopidogrel Tablet 75 milliGRAM(s) Oral daily  dextrose 5%. 1000 milliLiter(s) (50 mL/Hr) IV Continuous <Continuous>  dextrose 50% Injectable 12.5 Gram(s) IV Push once  dextrose 50% Injectable 25 Gram(s) IV Push once  dextrose 50% Injectable 25 Gram(s) IV Push once  DULoxetine 60 milliGRAM(s) Oral daily  heparin  Injectable 5000 Unit(s) SubCutaneous every 12 hours  influenza   Vaccine 0.5 milliLiter(s) IntraMuscular once  insulin glargine Injectable (LANTUS) 4 Unit(s) SubCutaneous at bedtime  insulin lispro (HumaLOG) corrective regimen sliding scale   SubCutaneous three times a day before meals  insulin lispro (HumaLOG) corrective regimen sliding scale   SubCutaneous at bedtime  insulin lispro Injectable (HumaLOG) 3 Unit(s) SubCutaneous before breakfast  insulin lispro Injectable (HumaLOG) 3 Unit(s) SubCutaneous before lunch  insulin lispro Injectable (HumaLOG) 3 Unit(s) SubCutaneous before dinner  metoprolol succinate ER 50 milliGRAM(s) Oral daily  sevelamer carbonate 800 milliGRAM(s) Oral three times a day with meals    MEDICATIONS  (PRN):  acetaminophen   Tablet .. 650 milliGRAM(s) Oral every 6 hours PRN Temp greater or equal to 38.5C (101.3F), Mild Pain (1 - 3)  dextrose 40% Gel 15 Gram(s) Oral once PRN Blood Glucose LESS THAN 70 milliGRAM(s)/deciliter  glucagon  Injectable 1 milliGRAM(s) IntraMuscular once PRN Glucose LESS THAN 70 milligrams/deciliter  oxyCODONE    5 mG/acetaminophen 325 mG 1 Tablet(s) Oral every 6 hours PRN Moderate Pain (4 - 6)      Allergies  No Known Allergies    Review of Systems:  Constitutional: No fever  Eyes: + blurry vision  Neuro: No tremors  HEENT: No pain  Cardiovascular: No chest pain, palpitations  Respiratory: No SOB, no cough  GI: No nausea, vomiting, abdominal pain  : No dysuria  Skin: no rash  Endocrine: no polyuria, polydipsia    ALL OTHER SYSTEMS REVIEWED AND NEGATIVE    PHYSICAL EXAM:  VITALS: T(C): 36.6 (04-19-19 @ 11:39)  T(F): 97.8 (04-19-19 @ 11:39), Max: 98.1 (04-19-19 @ 04:04)  HR: 78 (04-19-19 @ 11:39) (76 - 80)  BP: 160/73 (04-19-19 @ 11:39) (137/67 - 160/73)  RR:  (18 - 21)  SpO2:  (96% - 100%)  Wt(kg): --  GENERAL: NAD, well-developed  EYES: No proptosis, anicteric  HEENT:  Atraumatic, Normocephalic  THYROID: Normal size, no palpable nodules  RESPIRATORY: Clear to auscultation bilaterally; No rales, rhonchi, wheezing  CARDIOVASCULAR: Regular rate and rhythm; No murmurs; 1+pitting edema RLE and 2+ pitting edema LLE  GI: Soft, nontender, non distended, normal bowel sounds  SKIN: Dry, intact, No ulcers on feet; + ecchymoses on left forehead and nose  MUSCULOSKELETAL: Full range of motion, normal strength  NEURO: sensation intact, extraocular movements intact, no tremoPSYCH: Alert and oriented x 3, reactive affect      POCT Blood Glucose.: 165 mg/dL (04-19-19 @ 12:52) H 3, H 1  POCT Blood Glucose.: 466 mg/dL (04-19-19 @ 08:35)  POCT Blood Glucose.: 478 mg/dL (04-19-19 @ 08:33) H 3, H 6  POCT Blood Glucose.: 154 mg/dL (04-18-19 @ 22:00) Lantus 4  POCT Blood Glucose.: 189 mg/dL (04-18-19 @ 17:20) H 3  POCT Blood Glucose.: 148 mg/dL (04-18-19 @ 14:58)  POCT Blood Glucose.: 250 mg/dL (04-18-19 @ 12:14)  POCT Blood Glucose.: 283 mg/dL (04-18-19 @ 11:31)                          12.0   6.2   )-----------( 292      ( 18 Apr 2019 10:08 )             37.2       04-19    131<L>  |  91<L>  |  20  ----------------------------<  471<HH>  5.1   |  26  |  3.49<H>    EGFR if : 16<L>  EGFR if non : 13<L>    Ca    8.8      04-19    TPro  6.1  /  Alb  3.9  /  TBili  0.2  /  DBili  x   /  AST  19  /  ALT  11  /  AlkPhos  76  04-18      Hemoglobin A1C, Whole Blood: 7.9 % <H> [4.0 - 5.6] (02-27-19 @ 11:08)  Hemoglobin A1C, Whole Blood: 7.6 % <H> [4.0 - 5.6] (02-13-19 @ 08:42)

## 2019-04-20 ENCOUNTER — TRANSCRIPTION ENCOUNTER (OUTPATIENT)
Age: 64
End: 2019-04-20

## 2019-04-20 VITALS
OXYGEN SATURATION: 95 % | TEMPERATURE: 99 F | DIASTOLIC BLOOD PRESSURE: 76 MMHG | RESPIRATION RATE: 18 BRPM | SYSTOLIC BLOOD PRESSURE: 150 MMHG | HEART RATE: 76 BPM

## 2019-04-20 LAB
GLUCOSE BLDC GLUCOMTR-MCNC: 105 MG/DL — HIGH (ref 70–99)
GLUCOSE BLDC GLUCOMTR-MCNC: 260 MG/DL — HIGH (ref 70–99)

## 2019-04-20 PROCEDURE — 93005 ELECTROCARDIOGRAM TRACING: CPT

## 2019-04-20 PROCEDURE — 85610 PROTHROMBIN TIME: CPT

## 2019-04-20 PROCEDURE — 86706 HEP B SURFACE ANTIBODY: CPT

## 2019-04-20 PROCEDURE — 71250 CT THORAX DX C-: CPT

## 2019-04-20 PROCEDURE — 76377 3D RENDER W/INTRP POSTPROCES: CPT

## 2019-04-20 PROCEDURE — 80048 BASIC METABOLIC PNL TOTAL CA: CPT

## 2019-04-20 PROCEDURE — 84132 ASSAY OF SERUM POTASSIUM: CPT

## 2019-04-20 PROCEDURE — 82803 BLOOD GASES ANY COMBINATION: CPT

## 2019-04-20 PROCEDURE — 85014 HEMATOCRIT: CPT

## 2019-04-20 PROCEDURE — 87340 HEPATITIS B SURFACE AG IA: CPT

## 2019-04-20 PROCEDURE — 85730 THROMBOPLASTIN TIME PARTIAL: CPT

## 2019-04-20 PROCEDURE — 70486 CT MAXILLOFACIAL W/O DYE: CPT

## 2019-04-20 PROCEDURE — 82962 GLUCOSE BLOOD TEST: CPT

## 2019-04-20 PROCEDURE — 90715 TDAP VACCINE 7 YRS/> IM: CPT

## 2019-04-20 PROCEDURE — 80053 COMPREHEN METABOLIC PANEL: CPT

## 2019-04-20 PROCEDURE — 99285 EMERGENCY DEPT VISIT HI MDM: CPT | Mod: 25

## 2019-04-20 PROCEDURE — 82947 ASSAY GLUCOSE BLOOD QUANT: CPT

## 2019-04-20 PROCEDURE — 82435 ASSAY OF BLOOD CHLORIDE: CPT

## 2019-04-20 PROCEDURE — 96374 THER/PROPH/DIAG INJ IV PUSH: CPT

## 2019-04-20 PROCEDURE — 99261: CPT

## 2019-04-20 PROCEDURE — 97161 PT EVAL LOW COMPLEX 20 MIN: CPT

## 2019-04-20 PROCEDURE — 70450 CT HEAD/BRAIN W/O DYE: CPT

## 2019-04-20 PROCEDURE — 84295 ASSAY OF SERUM SODIUM: CPT

## 2019-04-20 PROCEDURE — 85027 COMPLETE CBC AUTOMATED: CPT

## 2019-04-20 PROCEDURE — 83605 ASSAY OF LACTIC ACID: CPT

## 2019-04-20 PROCEDURE — 90471 IMMUNIZATION ADMIN: CPT

## 2019-04-20 PROCEDURE — 86803 HEPATITIS C AB TEST: CPT

## 2019-04-20 PROCEDURE — 82330 ASSAY OF CALCIUM: CPT

## 2019-04-20 PROCEDURE — 94640 AIRWAY INHALATION TREATMENT: CPT

## 2019-04-20 RX ADMIN — Medication 3 UNIT(S): at 14:05

## 2019-04-20 RX ADMIN — Medication 3: at 08:53

## 2019-04-20 RX ADMIN — Medication 3 UNIT(S): at 08:54

## 2019-04-20 RX ADMIN — Medication 3 MILLILITER(S): at 13:56

## 2019-04-20 RX ADMIN — Medication 50 MILLIGRAM(S): at 05:34

## 2019-04-20 RX ADMIN — Medication 81 MILLIGRAM(S): at 13:56

## 2019-04-20 RX ADMIN — SEVELAMER CARBONATE 800 MILLIGRAM(S): 2400 POWDER, FOR SUSPENSION ORAL at 09:09

## 2019-04-20 RX ADMIN — SEVELAMER CARBONATE 800 MILLIGRAM(S): 2400 POWDER, FOR SUSPENSION ORAL at 13:58

## 2019-04-20 RX ADMIN — CLOPIDOGREL BISULFATE 75 MILLIGRAM(S): 75 TABLET, FILM COATED ORAL at 13:57

## 2019-04-20 RX ADMIN — DULOXETINE HYDROCHLORIDE 60 MILLIGRAM(S): 30 CAPSULE, DELAYED RELEASE ORAL at 13:57

## 2019-04-20 NOTE — PROGRESS NOTE ADULT - ASSESSMENT
64yo female PMH CS, Coronary Artery Disease, congestive heart failure, COPD, ESRD on HD Tu/Th/Sa, hyperlipidemia, presenting with nose pain s/p mechanical fall. also hyperkalemia    1-  esrd  2- pulm edema  3- htn    hd revealcear 300 3.5 hr 2 liter 2 k bath bfr 400 cc/min   see hd flow sheet

## 2019-04-20 NOTE — PROGRESS NOTE ADULT - ASSESSMENT
63 f with  s/p Fall  - pain control  - wound care    ESRD  - HD  - Nephrology follow Dr. Schmidt    Diabetes control  - endocrine evaluation     ACS (acute coronary syndrome) 1/2015 - cath revealed 100% ostial stenosis not amenable to PCI - medical management    CAD (coronary artery disease) s/p stents  - continue Rx    CHF (congestive heart failure)  - HD, continue Rx    COPD (chronic obstructive pulmonary disease)   - stable, nebs prn    DC home. Follow with PMD, Nephrology, Endocrinology, Cardiology in 2-3 days. QA  Pierce Viera MD pager [phone]

## 2019-04-20 NOTE — PROGRESS NOTE ADULT - SUBJECTIVE AND OBJECTIVE BOX
Patient is a 63y old  Female who presents with a chief complaint of fall (19 Apr 2019 18:22)      SUBJECTIVE / OVERNIGHT EVENTS: Comfortable without new complaints.   Review of Systems  chest pain no  palpitations no  sob no  nausea no  headache no    MEDICATIONS  (STANDING):  ALBUTerol/ipratropium for Nebulization 3 milliLiter(s) Nebulizer every 6 hours  aspirin enteric coated 81 milliGRAM(s) Oral daily  atorvastatin 20 milliGRAM(s) Oral at bedtime  clopidogrel Tablet 75 milliGRAM(s) Oral daily  dextrose 5%. 1000 milliLiter(s) (50 mL/Hr) IV Continuous <Continuous>  dextrose 50% Injectable 12.5 Gram(s) IV Push once  dextrose 50% Injectable 25 Gram(s) IV Push once  dextrose 50% Injectable 25 Gram(s) IV Push once  DULoxetine 60 milliGRAM(s) Oral daily  heparin  Injectable 5000 Unit(s) SubCutaneous every 12 hours  influenza   Vaccine 0.5 milliLiter(s) IntraMuscular once  insulin glargine Injectable (LANTUS) 4 Unit(s) SubCutaneous at bedtime  insulin lispro (HumaLOG) corrective regimen sliding scale   SubCutaneous three times a day before meals  insulin lispro (HumaLOG) corrective regimen sliding scale   SubCutaneous at bedtime  insulin lispro Injectable (HumaLOG) 3 Unit(s) SubCutaneous before breakfast  insulin lispro Injectable (HumaLOG) 3 Unit(s) SubCutaneous before lunch  insulin lispro Injectable (HumaLOG) 3 Unit(s) SubCutaneous before dinner  metoprolol succinate ER 50 milliGRAM(s) Oral daily  sevelamer carbonate 800 milliGRAM(s) Oral three times a day with meals    MEDICATIONS  (PRN):  acetaminophen   Tablet .. 650 milliGRAM(s) Oral every 6 hours PRN Temp greater or equal to 38.5C (101.3F), Mild Pain (1 - 3)  dextrose 40% Gel 15 Gram(s) Oral once PRN Blood Glucose LESS THAN 70 milliGRAM(s)/deciliter  glucagon  Injectable 1 milliGRAM(s) IntraMuscular once PRN Glucose LESS THAN 70 milligrams/deciliter  insulin lispro Injectable (HumaLOG) 2 Unit(s) SubCutaneous at bedtime PRN prn bedtime snack  oxyCODONE    5 mG/acetaminophen 325 mG 1 Tablet(s) Oral every 6 hours PRN Moderate Pain (4 - 6)      Vital Signs Last 24 Hrs  T(C): 36.5 (20 Apr 2019 09:15), Max: 36.9 (20 Apr 2019 04:25)  T(F): 97.7 (20 Apr 2019 09:15), Max: 98.5 (20 Apr 2019 04:25)  HR: 74 (20 Apr 2019 09:15) (73 - 82)  BP: 164/81 (20 Apr 2019 09:15) (146/76 - 164/81)  BP(mean): --  RR: 18 (20 Apr 2019 09:15) (18 - 20)  SpO2: 93% (20 Apr 2019 09:15) (93% - 100%)    PHYSICAL EXAM:  GENERAL: NAD, well-developed  HEAD:  L frontal contusion clean, Normocephalic  EYES: EOMI, PERRLA, conjunctiva and sclera clear  NECK: Supple, No JVD  CHEST/LUNG: Clear to auscultation bilaterally; No wheeze  HEART: Regular rate and rhythm; No murmurs, rubs, or gallops  ABDOMEN: Soft, Nontender, Nondistended; Bowel sounds present  EXTREMITIES:  2+ Peripheral Pulses, No clubbing, cyanosis, or edema   PSYCH: AAOx3  NEUROLOGY: non-focal  SKIN: No rashes or lesions    LABS:    04-19    131<L>  |  91<L>  |  20  ----------------------------<  471<HH>  5.1   |  26  |  3.49<H>    Ca    8.8      19 Apr 2019 06:35    TPro  6.1  /  Alb  3.9  /  TBili  0.2  /  DBili  x   /  AST  19  /  ALT  11  /  AlkPhos  76  04-18                RADIOLOGY & ADDITIONAL TESTS:    Imaging Personally Reviewed:    Consultant(s) Notes Reviewed:      Care Discussed with Consultants/Other Providers:

## 2019-04-20 NOTE — CONSULT NOTE ADULT - SUBJECTIVE AND OBJECTIVE BOX
CHIEF COMPLAINT: traumatic fall    HISTORY OF PRESENT ILLNESS:  62yo female PMH CS, Coronary Artery Disease, congestive heart failure, COPD, ESRD on HD Tu/Th/Sa, hyperlipidemia, presenting with nose pain s/p mechanical fall. Patient was getting ready to go to HD today, and tripped and fell onto face, no loss of consciousness, hit chest on floor. On Plavix. NO other injury.      Allergies  No Known Allergies      MEDICATIONS:  aspirin enteric coated 81 milliGRAM(s) Oral daily  clopidogrel Tablet 75 milliGRAM(s) Oral daily  heparin  Injectable 5000 Unit(s) SubCutaneous every 12 hours  metoprolol succinate ER 50 milliGRAM(s) Oral daily  ALBUTerol/ipratropium for Nebulization 3 milliLiter(s) Nebulizer every 6 hours  acetaminophen   Tablet .. 650 milliGRAM(s) Oral every 6 hours PRN  DULoxetine 60 milliGRAM(s) Oral daily  oxyCODONE    5 mG/acetaminophen 325 mG 1 Tablet(s) Oral every 6 hours PRN  atorvastatin 20 milliGRAM(s) Oral at bedtime  dextrose 40% Gel 15 Gram(s) Oral once PRN  dextrose 50% Injectable 12.5 Gram(s) IV Push once  dextrose 50% Injectable 25 Gram(s) IV Push once  dextrose 50% Injectable 25 Gram(s) IV Push once  glucagon  Injectable 1 milliGRAM(s) IntraMuscular once PRN  insulin glargine Injectable (LANTUS) 4 Unit(s) SubCutaneous at bedtime  insulin lispro (HumaLOG) corrective regimen sliding scale   SubCutaneous three times a day before meals  insulin lispro (HumaLOG) corrective regimen sliding scale   SubCutaneous at bedtime  insulin lispro Injectable (HumaLOG) 3 Unit(s) SubCutaneous before breakfast  insulin lispro Injectable (HumaLOG) 3 Unit(s) SubCutaneous before lunch  insulin lispro Injectable (HumaLOG) 3 Unit(s) SubCutaneous before dinner  insulin lispro Injectable (HumaLOG) 2 Unit(s) SubCutaneous at bedtime PRN  dextrose 5%. 1000 milliLiter(s) IV Continuous <Continuous>  influenza   Vaccine 0.5 milliLiter(s) IntraMuscular once      PAST MEDICAL & SURGICAL HISTORY:  COPD (chronic obstructive pulmonary disease)  Localized enlarged lymph nodes  CHF (congestive heart failure): EF 40-45%  Subclavian vein stenosis, left: s/p stent  DKA, type 1: 1/2015  ACS (acute coronary syndrome): 1/2015 - cath revealed 100% ostial stenosis not amenable to PCI - medical management  TIA (transient ischemic attack): x 2 - 8-9 years ago prior to ASD/VSD repair  CAD (coronary artery disease): s/p stents  Gout: past  CVA (cerebral infarction): with no residual, 8 yrs ago, prior to heart surgery - ST memory loss  Peripheral vascular disease: occluded left fem-pop bypass 5/2015  Diabetes mellitus type 1: Insulin Dependent -  ESRD (end stage renal disease): dialysis  M, tue, thursday, saturday  Hyperlipidemia  Status post device closure of ASD: &quot;clamshell&quot;  History of cardiac catheterization: 1/2015 - no intervention  S/P femoral-popliteal bypass surgery: L and R in 2013 with graft; 5/2015 CFA angioplasty left and ileofemoral endarterectomywith vein patch angioplasty of left fem-pop bypass graft  Multiple vascular surgery both leg, left fempop bypass revision 11/2015  AV (arteriovenous fistula): Left AV graft; revision with stent placement 2-3 years ago  S/P cholecystectomy      FAMILY HISTORY:  Family history of smoking  Family history of hypertension  Family history of cancer (Sibling)      SOCIAL HISTORY:    former smoker. independent in adl      REVIEW OF SYSTEMS:  See HPI, otherwise complete 10 point review of systems negative    [ ] All others negative	      PHYSICAL EXAM:  T(C): 36.5 (04-20-19 @ 09:15), Max: 36.9 (04-20-19 @ 04:25)  HR: 74 (04-20-19 @ 09:15) (73 - 82)  BP: 164/81 (04-20-19 @ 09:15) (146/76 - 164/81)  RR: 18 (04-20-19 @ 09:15) (18 - 20)  SpO2: 93% (04-20-19 @ 09:15) (93% - 100%)  Wt(kg): --  I&O's Summary    19 Apr 2019 07:01  -  20 Apr 2019 07:00  --------------------------------------------------------  IN: 840 mL / OUT: 200 mL / NET: 640 mL        Appearance: No Acute Distress	  HEENT:  Normal oral mucosa, PERRL, EOMI	  Cardiovascular: Normal S1 S2, No JVD, No murmurs/rubs/gallops  Respiratory: Lungs clear to auscultation bilaterally  Gastrointestinal:  Soft, Non-tender, + BS	  Skin: No rashes, No ecchymoses, No cyanosis	  Neurologic: Non-focal  Extremities: No clubbing, cyanosis or edema  Vascular: Peripheral pulses palpable 2+ bilaterally  Psychiatry: A & O x 3, Mood & affect appropriate    Laboratory Data:	 	      04-19    131<L>  |  91<L>  |  20  ----------------------------<  471<HH>  5.1   |  26  |  3.49<H>  04-18    135  |  91<L>  |  14  ----------------------------<  219<H>  3.9   |  27  |  2.50<H>    Ca    8.8      19 Apr 2019 06:35  Ca    9.0      18 Apr 2019 17:55    TPro  6.1  /  Alb  3.9  /  TBili  0.2  /  DBili  x   /  AST  19  /  ALT  11  /  AlkPhos  76  04-18    	    ECG:  	nsr. nonspecific st/t changes  	    Assessment:  -mechanical fall  -recurrent DKA  -volume overload  -ischemic cardiomyopathy, cad s/p multiple stents  -esrd on hd  -PAD s/p prior intervention   -      Recs:  -fall, likely mechanical --> pt with poor recollection of events. cardiac arrhythmia also in differential due to ischemic CM and low EF. ideally would be a candidate for ICD for primary prevention. patient would like to think about it for now. would monitor on tele  -volume overload --> c/w HD to maintain euvolemia  -DKA on admission --> f/u endo  -ischemic cardiomyopathy s/p C with Dr Vela 2/20 --> severe and diffuse instent restenosis along RCA --> unable to stent due to multiple layers of stenting and prior brachytherapy. we can consider complex intervention if true ischemic sx develop. c/w medical treatment with anti-platelet, statins and anti-anginals for now.  would resume metop succinate 50mg daily for anti-anginal effect and for pAT/NSVT  -monitor on tele. c/w beta blockers for nsvt/pat/cad (as above)  -hx of prolonged qtc. avoid qtc prolonging meds  -s/p TTE: mod-severe MR, pseudo AS (low flow-low gradient), mod-severe LV dysfunction --> recent CTS consult with dr hebert appreciated. plan is for medical management given surgical risk and patient preference  -c/w asa, plavix, statin for ischemic cardiomyopathy/CAD/PAD  -would resume toprol and hydralazine for ischemic cardiomyopathy and HTN   -dvt ppx      Greater than 60 minutes spent on total encounter; more than 50% of the visit was spent counseling and/or coordinating care by the attending physician.   	  Julián Biswas MD   Cardiovascular Diseases  [phone]

## 2019-04-20 NOTE — PROGRESS NOTE ADULT - SUBJECTIVE AND OBJECTIVE BOX
[facility]   [phone]  DIALYSIS NOTE  Chief Complaint: ESRD/Ongoing hemodialysis requirement. seen on hd    24 hour events/subjective:    states feels well         ALLERGIES & MEDICATIONS  --------------------------------------------------------------------------------  Allergies    No Known Allergies    Intolerances      Standing Inpatient Medications  ALBUTerol/ipratropium for Nebulization 3 milliLiter(s) Nebulizer every 6 hours  aspirin enteric coated 81 milliGRAM(s) Oral daily  atorvastatin 20 milliGRAM(s) Oral at bedtime  clopidogrel Tablet 75 milliGRAM(s) Oral daily  dextrose 5%. 1000 milliLiter(s) IV Continuous <Continuous>  dextrose 50% Injectable 12.5 Gram(s) IV Push once  dextrose 50% Injectable 25 Gram(s) IV Push once  dextrose 50% Injectable 25 Gram(s) IV Push once  DULoxetine 60 milliGRAM(s) Oral daily  heparin  Injectable 5000 Unit(s) SubCutaneous every 12 hours  influenza   Vaccine 0.5 milliLiter(s) IntraMuscular once  insulin glargine Injectable (LANTUS) 4 Unit(s) SubCutaneous at bedtime  insulin lispro (HumaLOG) corrective regimen sliding scale   SubCutaneous three times a day before meals  insulin lispro (HumaLOG) corrective regimen sliding scale   SubCutaneous at bedtime  insulin lispro Injectable (HumaLOG) 3 Unit(s) SubCutaneous before breakfast  insulin lispro Injectable (HumaLOG) 3 Unit(s) SubCutaneous before lunch  insulin lispro Injectable (HumaLOG) 3 Unit(s) SubCutaneous before dinner  metoprolol succinate ER 50 milliGRAM(s) Oral daily  sevelamer carbonate 800 milliGRAM(s) Oral three times a day with meals    PRN Inpatient Medications  acetaminophen   Tablet .. 650 milliGRAM(s) Oral every 6 hours PRN  dextrose 40% Gel 15 Gram(s) Oral once PRN  glucagon  Injectable 1 milliGRAM(s) IntraMuscular once PRN  insulin lispro Injectable (HumaLOG) 2 Unit(s) SubCutaneous at bedtime PRN  oxyCODONE    5 mG/acetaminophen 325 mG 1 Tablet(s) Oral every 6 hours PRN      REVIEW OF SYSTEMS  --------------------------------------------------------------------------------  no itching or rash  no fever or chill  no cp or palp   no sob or cough   no N/V/D/ no abd pain   ext no edema        VITALS/PHYSICAL EXAM  --------------------------------------------------------------------------------  T(C): 36.5 (04-20-19 @ 09:15), Max: 36.9 (04-20-19 @ 04:25)  HR: 74 (04-20-19 @ 09:15) (73 - 82)  BP: 164/81 (04-20-19 @ 09:15) (146/76 - 164/81)  RR: 18 (04-20-19 @ 09:15) (18 - 20)  SpO2: 93% (04-20-19 @ 09:15) (93% - 100%)  Wt(kg): --  Height (cm): 162.56 (04-18-19 @ 20:53)  Weight (kg): 53.2 (04-18-19 @ 20:53)  BMI (kg/m2): 20.1 (04-18-19 @ 20:53)  BSA (m2): 1.56 (04-18-19 @ 20:53)      04-19-19 @ 07:01  -  04-20-19 @ 07:00  --------------------------------------------------------  IN: 840 mL / OUT: 200 mL / NET: 640 mL      Physical Exam:  		  Gen: comfortable appearing  + facial abrasion   	no jvd ,  	Pulm: decrease bs  no rales or ronchi or wheezing  	CV: RRR, S1S2; no rub  	Abd: +BS, soft, nontender/nondistended  	: No suprapubic tenderness  	UE: Warm, no cyanosis  no clubbing,  no edema  	LE: Warm, no cyanosis  no clubbing,  LLE 2+ pitting  edema    	  LABS/STUDIES  --------------------------------------------------------------------------------    131  |  91  |  20  ----------------------------<  471      [04-19-19 @ 06:35]  5.1   |  26  |  3.49        Ca     8.8     [04-19-19 @ 06:35]                    imp/suggest: ESRD      Hemodialysis Prescription:  	Access:  	Dialyzer: revaclear   	Blood Flow (mL/Min): 400  	Dialysate Flow (mL/Min): 600  	Target UF (Liters):  	Treatment Time:  	Potassium:   	Calcium: 2.5  	  YOLANDA    Vitamin D     continue with hd   see hd flow sheet

## 2019-04-20 NOTE — PATIENT PROFILE ADULT - NSSTREETDRUGUSE_GEN_A_NUR
51 yo female, consult for syncope. F/u with Dr. Banda at cardiology office.  PMH CAD s/p CABGx2, s/p mechanical AVR and MV annuloplasty in 2014, normal EF, frequent PVCs, interstitial lung fibrosis,  And HTN.  C/o chest pain multiple times a day, at rest and with exertion. Over a yr. Also f/u with EP for PVCs.  Verapamil added in  for PVCs and NTG paste added one day ago. Could not tolerate Ranexa.  One ago, at night woke up due to GERD and blacked out after standing up from the bed. Woke up by herself and called the cardiology office in the morning and came to ER yesterday.  Now some chest and back pain.  SBP at 90 to 99 mmHg  EKG NSR And PVC.  CXr and brain CT negative   Troponin negative  BNP chronic elevation      04/20 BP improved for o/n. No chest pain, dyspnea and orthopnea. Hypoxia 87% on room air   never used

## 2019-04-20 NOTE — DISCHARGE NOTE NURSING/CASE MANAGEMENT/SOCIAL WORK - NSDCDPATPORTLINK_GEN_ALL_CORE
You can access the Exam18[facility] Patient Portal, offered by [facility], by registering with the following website: [email or web address]

## 2019-04-23 ENCOUNTER — APPOINTMENT (OUTPATIENT)
Dept: THORACIC SURGERY | Facility: CLINIC | Age: 64
End: 2019-04-23
Payer: MEDICARE

## 2019-04-23 VITALS
WEIGHT: 115 LBS | RESPIRATION RATE: 16 BRPM | HEIGHT: 64 IN | TEMPERATURE: 98.1 F | OXYGEN SATURATION: 99 % | BODY MASS INDEX: 19.63 KG/M2 | SYSTOLIC BLOOD PRESSURE: 159 MMHG | HEART RATE: 73 BPM | DIASTOLIC BLOOD PRESSURE: 83 MMHG

## 2019-04-23 PROCEDURE — 99214 OFFICE O/P EST MOD 30 MIN: CPT

## 2019-04-23 RX ORDER — AMLODIPINE BESYLATE 10 MG/1
10 TABLET ORAL
Refills: 0 | Status: COMPLETED | COMMUNITY
End: 2019-04-23

## 2019-04-23 RX ORDER — METOPROLOL SUCCINATE 25 MG/1
25 TABLET, EXTENDED RELEASE ORAL
Refills: 0 | Status: COMPLETED | COMMUNITY
End: 2019-04-23

## 2019-04-23 RX ORDER — FUROSEMIDE 40 MG/1
40 TABLET ORAL
Refills: 0 | Status: COMPLETED | COMMUNITY
End: 2019-04-23

## 2019-04-23 RX ORDER — LIDOCAINE 5 G/100G
5 OINTMENT TOPICAL
Qty: 1 | Refills: 0 | Status: COMPLETED | COMMUNITY
Start: 2017-01-20 | End: 2019-04-23

## 2019-04-26 ENCOUNTER — APPOINTMENT (OUTPATIENT)
Dept: PULMONOLOGY | Facility: CLINIC | Age: 64
End: 2019-04-26
Payer: MEDICARE

## 2019-04-26 VITALS
DIASTOLIC BLOOD PRESSURE: 74 MMHG | SYSTOLIC BLOOD PRESSURE: 159 MMHG | RESPIRATION RATE: 16 BRPM | HEART RATE: 78 BPM | OXYGEN SATURATION: 98 %

## 2019-04-26 PROCEDURE — 99214 OFFICE O/P EST MOD 30 MIN: CPT | Mod: 25

## 2019-04-26 PROCEDURE — 94060 EVALUATION OF WHEEZING: CPT

## 2019-04-26 NOTE — HISTORY OF PRESENT ILLNESS
[None] : ~He/She~ has no significant interval events [Difficulty Breathing During Exertion] : stable dyspnea on exertion [Feelings Of Weakness On Exertion] : stable exercise intolerance [Cough] : denies coughing [Regional Soft Tissue Swelling Both Lower Extremities] : stable lower extremity edema [Wheezing] : denies wheezing [Chest Pain Or Discomfort] : denies chest pain [Fever] : denies fever [Former] : is a former smoker [___ Year Quit] : ~He/She~ quit smoking in [unfilled] [Wt Gain ___ Lbs] : no recent weight gain [Wt Loss ___ Lbs] : no recent weight loss [Oxygen] : the patient uses no supplemental oxygen [FreeTextEntry1] : Status post E bus and mediastinoscopy cardiothoracic surgery to rule out lymphoma, rule out lung cancer with non diagnostic data\par History severe CHF\par Renal failure on hemodialysis\par Diabetes mellitus with history of recurrent episodes of DKA requiring hospitalization\par ASD\par Atherosclerotic heart disease \par pleural effusions secondary to congestive heart failure cardiac disease with fluid overload\par COPD\par Psychiatric disorder

## 2019-04-26 NOTE — DISCUSSION/SUMMARY
[FreeTextEntry1] : COPD\par Congestive heart failure\par Renal failure on dialysis\par Status post fall with facial trauma no reported subdural hematoma\par \par March 6, 2019\par Addendum\par Underwent bronchoscopy E bus mediastinoscopy\par AFB tissue culture negative\par Flow cytometry no diagnostic abnormalities\par Await formal pathology\par CXR\par Small possibly partially loculated right pleural effusion with likely associated passive atelectasis\par Resolution of prior reported bilateral increased reticular opacities and more confluent right-sided opacities felt a reflection of improvement of pulmonary edema\par Residual increased reticular opacities at the right base possibly pulmonary vascular congestion or subsegmental atelectasis\par \par Post cardiothoracic surgery evaluation\par As noted pathology post E bus hysteroscopy March 14, 2019 was negative for tumor\par CT chest done while hospitalized for a fall at [place] on April 18, 2019 findings suggested mild pulmonary edema\par Decrease in size of the mediastinal adenopathy compared to prior exam\par Agree with cardiothoracic surgery 3-month follow-up CAT scan of the chest

## 2019-04-26 NOTE — PHYSICAL EXAM
[General Appearance - Well Developed] : well developed [Normal Appearance] : normal appearance [Well Groomed] : well groomed [General Appearance - Well Nourished] : well nourished [No Deformities] : no deformities [General Appearance - In No Acute Distress] : no acute distress [Normal Conjunctiva] : the conjunctiva exhibited no abnormalities [Normal Oropharynx] : normal oropharynx [I] : I [Neck Appearance] : the appearance of the neck was normal [Neck Cervical Mass (___cm)] : no neck mass was observed [Jugular Venous Distention Increased] : there was no jugular-venous distention [Thyroid Diffuse Enlargement] : the thyroid was not enlarged [Heart Sounds] : normal S1 and S2 [Heart Rate And Rhythm] : heart rate and rhythm were normal [Murmurs] : no murmurs present [Respiration, Rhythm And Depth] : normal respiratory rhythm and effort [Exaggerated Use Of Accessory Muscles For Inspiration] : no accessory muscle use [Chest Palpation] : palpation of the chest revealed no abnormalities [Lungs Percussion] : the lungs were normal to percussion [Abdomen Soft] : soft [Bowel Sounds] : normal bowel sounds [Abdomen Tenderness] : non-tender [Abdomen Mass (___ Cm)] : no abdominal mass palpated [Nail Clubbing] : no clubbing of the fingernails [Cyanosis, Localized] : no localized cyanosis [Petechial Hemorrhages (___cm)] : no petechial hemorrhages [] : no ischemic changes [Deep Tendon Reflexes (DTR)] : deep tendon reflexes were 2+ and symmetric [Sensation] : the sensory exam was normal to light touch and pinprick [No Focal Deficits] : no focal deficits [Oriented To Time, Place, And Person] : oriented to person, place, and time [FreeTextEntry1] : distant BS with mild prolonged expiratory phase

## 2019-04-26 NOTE — REVIEW OF SYSTEMS
[As Noted in HPI] : as noted in HPI [Edema] : ~T edema was present [Claudication] : intermittent claudication [Diabetes] : diabetes mellitus [Negative] : Genitourinary/GYN [Chest Discomfort] : no chest discomfort [PND] : no PND [Orthopnea] : no orthopnea [FreeTextEntry8] : POST EBUS with Med Bx negative flow cytology/ LN pathology Reactive  [FreeTextEntry9] : CHF ASHD [de-identified] : s/p DKA hospital admission

## 2019-04-29 ENCOUNTER — INPATIENT (INPATIENT)
Facility: HOSPITAL | Age: 64
LOS: 1 days | Discharge: HOME | End: 2019-05-01
Attending: INTERNAL MEDICINE | Admitting: INTERNAL MEDICINE
Payer: MEDICARE

## 2019-04-29 VITALS
SYSTOLIC BLOOD PRESSURE: 125 MMHG | OXYGEN SATURATION: 99 % | RESPIRATION RATE: 18 BRPM | DIASTOLIC BLOOD PRESSURE: 63 MMHG | WEIGHT: 117.95 LBS | HEART RATE: 87 BPM | TEMPERATURE: 98 F

## 2019-04-29 DIAGNOSIS — N18.6 END STAGE RENAL DISEASE: ICD-10-CM

## 2019-04-29 DIAGNOSIS — G93.41 METABOLIC ENCEPHALOPATHY: ICD-10-CM

## 2019-04-29 DIAGNOSIS — E78.5 HYPERLIPIDEMIA, UNSPECIFIED: ICD-10-CM

## 2019-04-29 DIAGNOSIS — R73.9 HYPERGLYCEMIA, UNSPECIFIED: ICD-10-CM

## 2019-04-29 DIAGNOSIS — E10.22 TYPE 1 DIABETES MELLITUS WITH DIABETIC CHRONIC KIDNEY DISEASE: ICD-10-CM

## 2019-04-29 DIAGNOSIS — Z98.89 OTHER SPECIFIED POSTPROCEDURAL STATES: Chronic | ICD-10-CM

## 2019-04-29 LAB
ALBUMIN SERPL ELPH-MCNC: 3.5 G/DL — SIGNIFICANT CHANGE UP (ref 3.3–5)
ALP SERPL-CCNC: 81 U/L — SIGNIFICANT CHANGE UP (ref 40–120)
ALT FLD-CCNC: 21 U/L — SIGNIFICANT CHANGE UP (ref 12–78)
ANION GAP SERPL CALC-SCNC: 11 MMOL/L — SIGNIFICANT CHANGE UP (ref 5–17)
ANISOCYTOSIS BLD QL: SLIGHT — SIGNIFICANT CHANGE UP
APTT BLD: 26.2 SEC — LOW (ref 27.5–36.3)
AST SERPL-CCNC: 30 U/L — SIGNIFICANT CHANGE UP (ref 15–37)
BASOPHILS # BLD AUTO: 0.04 K/UL — SIGNIFICANT CHANGE UP (ref 0–0.2)
BASOPHILS NFR BLD AUTO: 0.6 % — SIGNIFICANT CHANGE UP (ref 0–2)
BILIRUB SERPL-MCNC: 0.3 MG/DL — SIGNIFICANT CHANGE UP (ref 0.2–1.2)
BUN SERPL-MCNC: 46 MG/DL — HIGH (ref 7–23)
CALCIUM SERPL-MCNC: 9.8 MG/DL — SIGNIFICANT CHANGE UP (ref 8.5–10.1)
CHLORIDE SERPL-SCNC: 97 MMOL/L — SIGNIFICANT CHANGE UP (ref 96–108)
CK MB BLD-MCNC: 2.7 % — SIGNIFICANT CHANGE UP (ref 0–3.5)
CK MB CFR SERPL CALC: 3.9 NG/ML — HIGH (ref 0.5–3.6)
CK SERPL-CCNC: 146 U/L — SIGNIFICANT CHANGE UP (ref 26–192)
CO2 SERPL-SCNC: 26 MMOL/L — SIGNIFICANT CHANGE UP (ref 22–31)
CREAT SERPL-MCNC: 5.93 MG/DL — HIGH (ref 0.5–1.3)
EOSINOPHIL # BLD AUTO: 0.02 K/UL — SIGNIFICANT CHANGE UP (ref 0–0.5)
EOSINOPHIL NFR BLD AUTO: 0.3 % — SIGNIFICANT CHANGE UP (ref 0–6)
GLUCOSE BLDC GLUCOMTR-MCNC: 309 MG/DL — HIGH (ref 70–99)
GLUCOSE BLDC GLUCOMTR-MCNC: 321 MG/DL — HIGH (ref 70–99)
GLUCOSE BLDC GLUCOMTR-MCNC: 528 MG/DL — CRITICAL HIGH (ref 70–99)
GLUCOSE BLDC GLUCOMTR-MCNC: 541 MG/DL — CRITICAL HIGH (ref 70–99)
GLUCOSE SERPL-MCNC: 271 MG/DL — HIGH (ref 70–99)
HCT VFR BLD CALC: 31.8 % — LOW (ref 34.5–45)
HGB BLD-MCNC: 9.8 G/DL — LOW (ref 11.5–15.5)
HYPOCHROMIA BLD QL: SLIGHT — SIGNIFICANT CHANGE UP
IMM GRANULOCYTES NFR BLD AUTO: 0.3 % — SIGNIFICANT CHANGE UP (ref 0–1.5)
INR BLD: 0.98 RATIO — SIGNIFICANT CHANGE UP (ref 0.88–1.16)
LACTATE SERPL-SCNC: 5.4 MMOL/L — CRITICAL HIGH (ref 0.7–2)
LG PLATELETS BLD QL AUTO: SLIGHT — SIGNIFICANT CHANGE UP
LYMPHOCYTES # BLD AUTO: 0.59 K/UL — LOW (ref 1–3.3)
LYMPHOCYTES # BLD AUTO: 8.6 % — LOW (ref 13–44)
MACROCYTES BLD QL: SLIGHT — SIGNIFICANT CHANGE UP
MANUAL SMEAR VERIFICATION: SIGNIFICANT CHANGE UP
MCHC RBC-ENTMCNC: 30.8 GM/DL — LOW (ref 32–36)
MCHC RBC-ENTMCNC: 32.7 PG — SIGNIFICANT CHANGE UP (ref 27–34)
MCV RBC AUTO: 106 FL — HIGH (ref 80–100)
MICROCYTES BLD QL: SLIGHT — SIGNIFICANT CHANGE UP
MONOCYTES # BLD AUTO: 0.55 K/UL — SIGNIFICANT CHANGE UP (ref 0–0.9)
MONOCYTES NFR BLD AUTO: 8.1 % — SIGNIFICANT CHANGE UP (ref 2–14)
NEUTROPHILS # BLD AUTO: 5.61 K/UL — SIGNIFICANT CHANGE UP (ref 1.8–7.4)
NEUTROPHILS NFR BLD AUTO: 82.1 % — HIGH (ref 43–77)
NRBC # BLD: 0 /100 WBCS — SIGNIFICANT CHANGE UP (ref 0–0)
PLAT MORPH BLD: NORMAL — SIGNIFICANT CHANGE UP
PLATELET # BLD AUTO: 227 K/UL — SIGNIFICANT CHANGE UP (ref 150–400)
POTASSIUM SERPL-MCNC: 5.9 MMOL/L — HIGH (ref 3.5–5.3)
POTASSIUM SERPL-SCNC: 5.9 MMOL/L — HIGH (ref 3.5–5.3)
PROT SERPL-MCNC: 7.1 GM/DL — SIGNIFICANT CHANGE UP (ref 6–8.3)
PROTHROM AB SERPL-ACNC: 11 SEC — SIGNIFICANT CHANGE UP (ref 10–12.9)
RBC # BLD: 3 M/UL — LOW (ref 3.8–5.2)
RBC # FLD: 14.6 % — HIGH (ref 10.3–14.5)
RBC BLD AUTO: ABNORMAL
SODIUM SERPL-SCNC: 134 MMOL/L — LOW (ref 135–145)
WBC # BLD: 6.83 K/UL — SIGNIFICANT CHANGE UP (ref 3.8–10.5)
WBC # FLD AUTO: 6.83 K/UL — SIGNIFICANT CHANGE UP (ref 3.8–10.5)

## 2019-04-29 PROCEDURE — 74177 CT ABD & PELVIS W/CONTRAST: CPT | Mod: 26

## 2019-04-29 PROCEDURE — 70450 CT HEAD/BRAIN W/O DYE: CPT | Mod: 26

## 2019-04-29 PROCEDURE — 99285 EMERGENCY DEPT VISIT HI MDM: CPT

## 2019-04-29 PROCEDURE — 99223 1ST HOSP IP/OBS HIGH 75: CPT | Mod: AI

## 2019-04-29 PROCEDURE — 93010 ELECTROCARDIOGRAM REPORT: CPT

## 2019-04-29 PROCEDURE — 71045 X-RAY EXAM CHEST 1 VIEW: CPT | Mod: 26

## 2019-04-29 RX ORDER — INSULIN HUMAN 100 [IU]/ML
8 INJECTION, SOLUTION SUBCUTANEOUS ONCE
Qty: 0 | Refills: 0 | Status: COMPLETED | OUTPATIENT
Start: 2019-04-29 | End: 2019-04-29

## 2019-04-29 RX ORDER — INSULIN LISPRO 100/ML
VIAL (ML) SUBCUTANEOUS
Qty: 0 | Refills: 0 | Status: DISCONTINUED | OUTPATIENT
Start: 2019-04-29 | End: 2019-05-01

## 2019-04-29 RX ORDER — INSULIN LISPRO 100/ML
VIAL (ML) SUBCUTANEOUS AT BEDTIME
Qty: 0 | Refills: 0 | Status: DISCONTINUED | OUTPATIENT
Start: 2019-04-29 | End: 2019-05-01

## 2019-04-29 RX ORDER — DEXTROSE 50 % IN WATER 50 %
15 SYRINGE (ML) INTRAVENOUS ONCE
Qty: 0 | Refills: 0 | Status: DISCONTINUED | OUTPATIENT
Start: 2019-04-29 | End: 2019-05-01

## 2019-04-29 RX ORDER — LISINOPRIL 2.5 MG/1
40 TABLET ORAL DAILY
Qty: 0 | Refills: 0 | Status: DISCONTINUED | OUTPATIENT
Start: 2019-04-29 | End: 2019-05-01

## 2019-04-29 RX ORDER — ATORVASTATIN CALCIUM 80 MG/1
20 TABLET, FILM COATED ORAL AT BEDTIME
Qty: 0 | Refills: 0 | Status: DISCONTINUED | OUTPATIENT
Start: 2019-04-29 | End: 2019-05-01

## 2019-04-29 RX ORDER — SODIUM CHLORIDE 9 MG/ML
1000 INJECTION, SOLUTION INTRAVENOUS
Qty: 0 | Refills: 0 | Status: DISCONTINUED | OUTPATIENT
Start: 2019-04-29 | End: 2019-05-01

## 2019-04-29 RX ORDER — GLUCAGON INJECTION, SOLUTION 0.5 MG/.1ML
1 INJECTION, SOLUTION SUBCUTANEOUS ONCE
Qty: 0 | Refills: 0 | Status: DISCONTINUED | OUTPATIENT
Start: 2019-04-29 | End: 2019-05-01

## 2019-04-29 RX ORDER — FUROSEMIDE 40 MG
40 TABLET ORAL DAILY
Qty: 0 | Refills: 0 | Status: DISCONTINUED | OUTPATIENT
Start: 2019-04-29 | End: 2019-05-01

## 2019-04-29 RX ORDER — DEXTROSE 50 % IN WATER 50 %
25 SYRINGE (ML) INTRAVENOUS ONCE
Qty: 0 | Refills: 0 | Status: DISCONTINUED | OUTPATIENT
Start: 2019-04-29 | End: 2019-05-01

## 2019-04-29 RX ORDER — SODIUM CHLORIDE 9 MG/ML
500 INJECTION INTRAMUSCULAR; INTRAVENOUS; SUBCUTANEOUS ONCE
Qty: 0 | Refills: 0 | Status: COMPLETED | OUTPATIENT
Start: 2019-04-29 | End: 2019-04-29

## 2019-04-29 RX ORDER — SEVELAMER CARBONATE 2400 MG/1
1600 POWDER, FOR SUSPENSION ORAL
Qty: 0 | Refills: 0 | Status: DISCONTINUED | OUTPATIENT
Start: 2019-04-29 | End: 2019-05-01

## 2019-04-29 RX ORDER — DEXTROSE 50 % IN WATER 50 %
12.5 SYRINGE (ML) INTRAVENOUS ONCE
Qty: 0 | Refills: 0 | Status: DISCONTINUED | OUTPATIENT
Start: 2019-04-29 | End: 2019-05-01

## 2019-04-29 RX ORDER — DULOXETINE HYDROCHLORIDE 30 MG/1
60 CAPSULE, DELAYED RELEASE ORAL DAILY
Qty: 0 | Refills: 0 | Status: DISCONTINUED | OUTPATIENT
Start: 2019-04-29 | End: 2019-05-01

## 2019-04-29 RX ORDER — ASPIRIN/CALCIUM CARB/MAGNESIUM 324 MG
81 TABLET ORAL DAILY
Qty: 0 | Refills: 0 | Status: DISCONTINUED | OUTPATIENT
Start: 2019-04-29 | End: 2019-05-01

## 2019-04-29 RX ORDER — INSULIN GLARGINE 100 [IU]/ML
5 INJECTION, SOLUTION SUBCUTANEOUS AT BEDTIME
Qty: 0 | Refills: 0 | Status: DISCONTINUED | OUTPATIENT
Start: 2019-04-29 | End: 2019-04-30

## 2019-04-29 RX ORDER — SEVELAMER CARBONATE 2400 MG/1
3 POWDER, FOR SUSPENSION ORAL
Qty: 0 | Refills: 0 | COMMUNITY

## 2019-04-29 RX ORDER — OXYCODONE AND ACETAMINOPHEN 5; 325 MG/1; MG/1
1 TABLET ORAL EVERY 6 HOURS
Qty: 0 | Refills: 0 | Status: DISCONTINUED | OUTPATIENT
Start: 2019-04-29 | End: 2019-05-01

## 2019-04-29 RX ORDER — HYDRALAZINE HCL 50 MG
25 TABLET ORAL THREE TIMES A DAY
Qty: 0 | Refills: 0 | Status: DISCONTINUED | OUTPATIENT
Start: 2019-04-29 | End: 2019-05-01

## 2019-04-29 RX ORDER — CLOPIDOGREL BISULFATE 75 MG/1
75 TABLET, FILM COATED ORAL DAILY
Qty: 0 | Refills: 0 | Status: DISCONTINUED | OUTPATIENT
Start: 2019-04-29 | End: 2019-05-01

## 2019-04-29 RX ORDER — INFLUENZA VIRUS VACCINE 15; 15; 15; 15 UG/.5ML; UG/.5ML; UG/.5ML; UG/.5ML
0.5 SUSPENSION INTRAMUSCULAR ONCE
Qty: 0 | Refills: 0 | Status: DISCONTINUED | OUTPATIENT
Start: 2019-04-29 | End: 2019-05-01

## 2019-04-29 RX ADMIN — CLOPIDOGREL BISULFATE 75 MILLIGRAM(S): 75 TABLET, FILM COATED ORAL at 22:06

## 2019-04-29 RX ADMIN — OXYCODONE AND ACETAMINOPHEN 1 TABLET(S): 5; 325 TABLET ORAL at 23:21

## 2019-04-29 RX ADMIN — Medication 2: at 22:07

## 2019-04-29 RX ADMIN — Medication 81 MILLIGRAM(S): at 22:06

## 2019-04-29 RX ADMIN — ATORVASTATIN CALCIUM 20 MILLIGRAM(S): 80 TABLET, FILM COATED ORAL at 22:06

## 2019-04-29 RX ADMIN — INSULIN HUMAN 8 UNIT(S): 100 INJECTION, SOLUTION SUBCUTANEOUS at 14:03

## 2019-04-29 RX ADMIN — INSULIN GLARGINE 5 UNIT(S): 100 INJECTION, SOLUTION SUBCUTANEOUS at 22:22

## 2019-04-29 RX ADMIN — SODIUM CHLORIDE 1000 MILLILITER(S): 9 INJECTION INTRAMUSCULAR; INTRAVENOUS; SUBCUTANEOUS at 13:48

## 2019-04-29 RX ADMIN — OXYCODONE AND ACETAMINOPHEN 1 TABLET(S): 5; 325 TABLET ORAL at 22:21

## 2019-04-29 RX ADMIN — Medication 1 TABLET(S): at 22:06

## 2019-04-29 RX ADMIN — Medication 25 MILLIGRAM(S): at 22:06

## 2019-04-29 NOTE — ED PROVIDER NOTE - OBJECTIVE STATEMENT
62 yo female with CKD on dialysis,k diabetes, s/p fall 12 days ago with evaluation at [facility] presents with altered mental status from home. As per notes, patient may be doubled insulin, found on floor. Patient due for dialysis today.

## 2019-04-29 NOTE — H&P ADULT - ASSESSMENT
63f with Altered Mental Status     IMPROVE VTE Individual Risk Assessment    RISK                                                          Points  [] Previous VTE                                           3  [] Thrombophilia                                        2  [] Lower limb paralysis                              2   [] Current Cancer                                       2   [x] Immobilization > 24 hrs                        1  [] ICU/CCU stay > 24 hours                       1  x[] Age > 60                                                   1    IMPROVE VTE Score:2  heparin

## 2019-04-29 NOTE — CONSULT NOTE ADULT - SUBJECTIVE AND OBJECTIVE BOX
"Patient is a 63y old  Female who presents with a chief complaint of weakness  HPI: Patient with increasing lethargy this am on her way to hemodialysis at [place] ( Dr. Saeed),   Noted increasing pain in LLE; hx of LE bypass;   Recently hospitalized for mechanical fall. Has a predilection for hyperkalemia;     No sob or chest pain ;   ++ LLE pain on palpation;    "    PAST MEDICAL & SURGICAL HISTORY:  COPD (chronic obstructive pulmonary disease)  Localized enlarged lymph nodes  CHF (congestive heart failure): EF 40-45%  Subclavian vein stenosis, left: s/p stent  DKA, type 1: 1/2015  ACS (acute coronary syndrome): 1/2015 - cath revealed 100% ostial stenosis not amenable to PCI - medical management  TIA (transient ischemic attack): x 2 - 8-9 years ago prior to ASD/VSD repair  CAD (coronary artery disease): s/p stents  Gout: past  CVA (cerebral infarction): with no residual, 8 yrs ago, prior to heart surgery - ST memory loss  Peripheral vascular disease: occluded left fem-pop bypass 5/2015  Diabetes mellitus type 1: Insulin Dependent -  ESRD (end stage renal disease): dialysis  M, tue, thursday, saturday  Hyperlipidemia  Status post device closure of ASD: &quot;clamshell&quot;  History of cardiac catheterization: 1/2015 - no intervention  S/P femoral-popliteal bypass surgery: L and R in 2013 with graft; 5/2015 CFA angioplasty left and ileofemoral endarterectomywith vein patch angioplasty of left fem-pop bypass graft  Multiple vascular surgery both leg, left fempop bypass revision 11/2015  AV (arteriovenous fistula): Left AV graft; revision with stent placement 2-3 years ago  S/P cholecystectomy    FAMILY HISTORY:  Family history of smoking  Family history of hypertension  Family history of cancer (Sibling)    Allergies    No Known Allergies    Intolerances      Home Medications:  aspirin 81 mg oral delayed release tablet: 1 tab(s) orally once a day (04 Apr 2019 13:46)  atorvastatin 20 mg oral tablet: 2 tab(s) orally once a day (at bedtime) (20 Apr 2019 09:42)  clopidogrel 75 mg oral tablet: 1 tab(s) orally once a day (04 Apr 2019 13:46)  DULoxetine 60 mg oral delayed release capsule: 1 cap(s) orally once a day (26 Mar 2019 11:00)  HumaLOG: 3  subcutaneous 3 times a day (before meals) (04 Apr 2019 13:46)  Lantus Solostar Pen 100 units/mL subcutaneous solution: 5 unit(s) subcutaneous once a day (at bedtime) (26 Mar 2019 11:00)  metoprolol succinate 50 mg oral tablet, extended release: 1 tab(s) orally once a day (04 Apr 2019 13:46)  Renvela 800 mg oral tablet: 3 tab(s) orally 3 times a day (with meals) (26 Mar 2019 11:00)    MEDICATIONS  (STANDING):    MEDICATIONS  (PRN):    Vital Signs Last 24 Hrs  T(C): 36.8 (29 Apr 2019 12:21), Max: 36.8 (29 Apr 2019 12:21)  T(F): 98.3 (29 Apr 2019 12:21), Max: 98.3 (29 Apr 2019 12:21)  HR: 87 (29 Apr 2019 12:21) (87 - 87)  BP: 125/63 (29 Apr 2019 12:21) (125/63 - 125/63)  BP(mean): --  RR: 18 (29 Apr 2019 12:21) (18 - 18)  SpO2: 99% (29 Apr 2019 12:21) (99% - 99%)    Daily     Daily     CAPILLARY BLOOD GLUCOSE      POCT Blood Glucose.: 528 mg/dL (29 Apr 2019 12:29)  POCT Blood Glucose.: 541 mg/dL (29 Apr 2019 12:28)    PHYSICAL EXAM:      T(C): 36.8 (04-29-19 @ 12:21), Max: 36.8 (04-29-19 @ 12:21)  HR: 87 (04-29-19 @ 12:21) (87 - 87)  BP: 125/63 (04-29-19 @ 12:21) (125/63 - 125/63)  RR: 18 (04-29-19 @ 12:21) (18 - 18)  SpO2: 99% (04-29-19 @ 12:21) (99% - 99%)  Wt(kg): --  Respiratory: clear anteriorly, decreased BS at bases  Cardiovascular: S1 S2  Gastrointestinal: soft NT ND +BS  Extremities:   L> R edema LLE tenderness; AV access + bruit and thrill                                          9.8    6.83  )-----------( 227      ( 29 Apr 2019 14:05 )             31.8             Assessment and Plan    ESRD; hyperglycemia; increasing lethargy;   Hx of hyperkalemia; suspected again especially with hyperglycemia;   Will dialyze today for 2 hours,  routine for tomorrow 3.5 hrs;   Vascular evaluation;

## 2019-04-29 NOTE — ED PROVIDER NOTE - CONSTITUTIONAL, MLM
normal... Well appearing, well nourished, awake, alert, oriented to person, place, time/situation and in no apparent distress; left UE graft

## 2019-04-29 NOTE — H&P ADULT - NSHPREVIEWOFSYSTEMS_GEN_ALL_CORE
dementia CONSTITUTIONAL: No fever, weight loss, fatigue  EYES: No eye pain, visual disturbances, or discharge  ENMT:  No difficulty hearing, tinnitus, vertigo; No sinus or throat pain  NECK: No pain or stiffness  RESPIRATORY: No cough, wheezing, chills or hemoptysis; No shortness of breath  CARDIOVASCULAR: No chest pain, palpitations, dizziness, or leg swelling  GASTROINTESTINAL: No abdominal or epigastric pain. No nausea, vomiting, or hematemesis; No diarrhea or constipation. No melena or hematochezia.  GENITOURINARY: No dysuria, frequency, hematuria, or incontinence  NEUROLOGICAL: No headaches,POSITIVE memory loss,no  loss of strength, numbness, or tremors  SKIN: No itching, burning, rashes, or lesions   LYMPH NODES: No enlarged glands  ENDOCRINE: No heat or cold intolerance; No hair loss  MUSCULOSKELETAL: No joint pain or swelling; No muscle, back, or extremity pain  PSYCHIATRIC: No depression, anxiety, mood swings, or difficulty sleeping  HEME/LYMPH: No easy bruising, or bleeding gums  ALLERGY AND IMMUNOLOGIC: No hives or eczema

## 2019-04-29 NOTE — H&P ADULT - HISTORY OF PRESENT ILLNESS
64 yo female with CKD on dialysis,k diabetes, s/p fall 12 days ago with evaluation at [facility] presents with altered mental status from home. As per notes, patient may be doubled insulin, found on floor. Patient due for dialysis today. 64 yo female with CKD on dialysis,k diabetes, s/p fall 12 days ago with evaluation at [facility] presents with altered mental status from home. As per notes, patient may be doubled insulin, found on floor. Patient due for dialysis today.- patient says she did not go because "she freaked out "- found to have very elevated glucose

## 2019-04-29 NOTE — H&P ADULT - NSHPPHYSICALEXAM_GEN_ALL_CORE
ICU Vital Signs Last 24 Hrs  T(C): 36.8 (29 Apr 2019 12:21), Max: 36.8 (29 Apr 2019 12:21)  T(F): 98.3 (29 Apr 2019 12:21), Max: 98.3 (29 Apr 2019 12:21)  HR: 83 (29 Apr 2019 16:59) (83 - 87)  BP: 150/64 (29 Apr 2019 16:59) (125/63 - 150/64)  BP(mean): --  ABP: --  ABP(mean): --  RR: 24 (29 Apr 2019 16:59) (18 - 24)  SpO2: 98% (29 Apr 2019 16:59) (98% - 99%)  GENERAL: NAD well-developed  HEAD:  Atraumatic, Normocephalic  EYES: EOMI, PERRLA, conjunctiva and sclera clear  ENMT: No tonsillar erythema, exudates, or enlargement; Moist mucous membranes, Good dentition, No lesions  NECK: Supple, No JVD, Normal thyroid  NERVOUS SYSTEM:  Alert & Oriented X3, Good concentration; Motor Strength 5/5 B/L upper and lower extremities; DTRs 2+ intact and symmetric  CHEST/LUNG: Clear to percussion bilaterally; No rales, rhonchi, wheezing, or rubs  HEART: Regular rate and rhythm; No murmurs, rubs, or gallops  ABDOMEN: Soft, Nontender, Nondistended; Bowel sounds present  EXTREMITIES:  2+ Peripheral Pulses, No clubbing, cyanosis, or edema  LYMPH: No lymphadenopathy   SKIN: No rashes or lesions

## 2019-04-29 NOTE — ED ADULT TRIAGE NOTE - CHIEF COMPLAINT QUOTE
pt BIBA with c/o lethargy and chest pain, found to be hypotensive in the field SBP in the 70's on HD as per medics was to have HD today, pt read JARETH TEMPLETON on BS in the field as per medics states  thinks she doubled up on her insulin he administered a dose and she did as well

## 2019-04-30 LAB
ANION GAP SERPL CALC-SCNC: 11 MMOL/L — SIGNIFICANT CHANGE UP (ref 5–17)
BUN SERPL-MCNC: 25 MG/DL — HIGH (ref 7–23)
CALCIUM SERPL-MCNC: 8.9 MG/DL — SIGNIFICANT CHANGE UP (ref 8.5–10.1)
CHLORIDE SERPL-SCNC: 98 MMOL/L — SIGNIFICANT CHANGE UP (ref 96–108)
CO2 SERPL-SCNC: 26 MMOL/L — SIGNIFICANT CHANGE UP (ref 22–31)
CREAT SERPL-MCNC: 4.09 MG/DL — HIGH (ref 0.5–1.3)
GLUCOSE BLDC GLUCOMTR-MCNC: 383 MG/DL — HIGH (ref 70–99)
GLUCOSE SERPL-MCNC: 335 MG/DL — HIGH (ref 70–99)
HBA1C BLD-MCNC: 8.8 % — HIGH (ref 4–5.6)
HCT VFR BLD CALC: 28.6 % — LOW (ref 34.5–45)
HGB BLD-MCNC: 8.7 G/DL — LOW (ref 11.5–15.5)
MAGNESIUM SERPL-MCNC: 2 MG/DL — SIGNIFICANT CHANGE UP (ref 1.6–2.6)
MCHC RBC-ENTMCNC: 30.4 GM/DL — LOW (ref 32–36)
MCHC RBC-ENTMCNC: 32.1 PG — SIGNIFICANT CHANGE UP (ref 27–34)
MCV RBC AUTO: 105.5 FL — HIGH (ref 80–100)
NRBC # BLD: 0 /100 WBCS — SIGNIFICANT CHANGE UP (ref 0–0)
PHOSPHATE SERPL-MCNC: 2.5 MG/DL — SIGNIFICANT CHANGE UP (ref 2.5–4.5)
PLATELET # BLD AUTO: 195 K/UL — SIGNIFICANT CHANGE UP (ref 150–400)
POTASSIUM SERPL-MCNC: 3.9 MMOL/L — SIGNIFICANT CHANGE UP (ref 3.5–5.3)
POTASSIUM SERPL-MCNC: 5.4 MMOL/L — HIGH (ref 3.5–5.3)
POTASSIUM SERPL-SCNC: 3.9 MMOL/L — SIGNIFICANT CHANGE UP (ref 3.5–5.3)
POTASSIUM SERPL-SCNC: 5.4 MMOL/L — HIGH (ref 3.5–5.3)
RBC # BLD: 2.71 M/UL — LOW (ref 3.8–5.2)
RBC # FLD: 14.7 % — HIGH (ref 10.3–14.5)
SODIUM SERPL-SCNC: 135 MMOL/L — SIGNIFICANT CHANGE UP (ref 135–145)
WBC # BLD: 4.35 K/UL — SIGNIFICANT CHANGE UP (ref 3.8–10.5)
WBC # FLD AUTO: 4.35 K/UL — SIGNIFICANT CHANGE UP (ref 3.8–10.5)

## 2019-04-30 PROCEDURE — 99233 SBSQ HOSP IP/OBS HIGH 50: CPT

## 2019-04-30 RX ORDER — INSULIN GLARGINE 100 [IU]/ML
10 INJECTION, SOLUTION SUBCUTANEOUS AT BEDTIME
Qty: 0 | Refills: 0 | Status: DISCONTINUED | OUTPATIENT
Start: 2019-04-30 | End: 2019-05-01

## 2019-04-30 RX ORDER — INSULIN LISPRO 100/ML
5 VIAL (ML) SUBCUTANEOUS
Qty: 0 | Refills: 0 | Status: DISCONTINUED | OUTPATIENT
Start: 2019-04-30 | End: 2019-05-01

## 2019-04-30 RX ADMIN — Medication 2: at 17:11

## 2019-04-30 RX ADMIN — OXYCODONE AND ACETAMINOPHEN 1 TABLET(S): 5; 325 TABLET ORAL at 12:30

## 2019-04-30 RX ADMIN — Medication 5: at 07:41

## 2019-04-30 RX ADMIN — DULOXETINE HYDROCHLORIDE 60 MILLIGRAM(S): 30 CAPSULE, DELAYED RELEASE ORAL at 17:16

## 2019-04-30 RX ADMIN — ATORVASTATIN CALCIUM 20 MILLIGRAM(S): 80 TABLET, FILM COATED ORAL at 21:16

## 2019-04-30 RX ADMIN — LISINOPRIL 40 MILLIGRAM(S): 2.5 TABLET ORAL at 05:03

## 2019-04-30 RX ADMIN — INSULIN GLARGINE 10 UNIT(S): 100 INJECTION, SOLUTION SUBCUTANEOUS at 21:17

## 2019-04-30 RX ADMIN — OXYCODONE AND ACETAMINOPHEN 1 TABLET(S): 5; 325 TABLET ORAL at 04:21

## 2019-04-30 RX ADMIN — Medication 81 MILLIGRAM(S): at 17:15

## 2019-04-30 RX ADMIN — Medication 25 MILLIGRAM(S): at 05:03

## 2019-04-30 RX ADMIN — OXYCODONE AND ACETAMINOPHEN 1 TABLET(S): 5; 325 TABLET ORAL at 05:21

## 2019-04-30 RX ADMIN — Medication 1 TABLET(S): at 17:16

## 2019-04-30 RX ADMIN — SEVELAMER CARBONATE 1600 MILLIGRAM(S): 2400 POWDER, FOR SUSPENSION ORAL at 17:13

## 2019-04-30 RX ADMIN — Medication 5 UNIT(S): at 17:11

## 2019-04-30 RX ADMIN — Medication 40 MILLIGRAM(S): at 05:04

## 2019-04-30 RX ADMIN — OXYCODONE AND ACETAMINOPHEN 1 TABLET(S): 5; 325 TABLET ORAL at 16:25

## 2019-04-30 RX ADMIN — CLOPIDOGREL BISULFATE 75 MILLIGRAM(S): 75 TABLET, FILM COATED ORAL at 17:15

## 2019-04-30 RX ADMIN — Medication 25 MILLIGRAM(S): at 21:16

## 2019-04-30 RX ADMIN — OXYCODONE AND ACETAMINOPHEN 1 TABLET(S): 5; 325 TABLET ORAL at 22:17

## 2019-04-30 RX ADMIN — SEVELAMER CARBONATE 1600 MILLIGRAM(S): 2400 POWDER, FOR SUSPENSION ORAL at 08:20

## 2019-04-30 RX ADMIN — OXYCODONE AND ACETAMINOPHEN 1 TABLET(S): 5; 325 TABLET ORAL at 21:16

## 2019-04-30 NOTE — CONSULT NOTE ADULT - ASSESSMENT
Uncontrolled Type 1 DM, Hgb A1c of 8.8% on this admission, falsely low due to HD.    Continue Lantus 5 units qhs.   On Low SSI AC/HS.  Monitor finger sticks and will adjust accordingly.     Thank you for allowing us to participate in patient's care.

## 2019-04-30 NOTE — PROGRESS NOTE ADULT - SUBJECTIVE AND OBJECTIVE BOX
Patient feels well no complaints today. Feels better.    MEDICATIONS  (STANDING):  aspirin enteric coated 81 milliGRAM(s) Oral daily  atorvastatin 20 milliGRAM(s) Oral at bedtime  clopidogrel Tablet 75 milliGRAM(s) Oral daily  dextrose 5%. 1000 milliLiter(s) (50 mL/Hr) IV Continuous <Continuous>  dextrose 50% Injectable 12.5 Gram(s) IV Push once  dextrose 50% Injectable 25 Gram(s) IV Push once  dextrose 50% Injectable 25 Gram(s) IV Push once  DULoxetine 60 milliGRAM(s) Oral daily  furosemide    Tablet 40 milliGRAM(s) Oral daily  hydrALAZINE 25 milliGRAM(s) Oral three times a day  influenza   Vaccine 0.5 milliLiter(s) IntraMuscular once  insulin glargine Injectable (LANTUS) 10 Unit(s) SubCutaneous at bedtime  insulin lispro (HumaLOG) corrective regimen sliding scale   SubCutaneous three times a day before meals  insulin lispro (HumaLOG) corrective regimen sliding scale   SubCutaneous at bedtime  insulin lispro Injectable (HumaLOG) 5 Unit(s) SubCutaneous three times a day before meals  lisinopril 40 milliGRAM(s) Oral daily  multivitamin 1 Tablet(s) Oral daily  sevelamer carbonate 1600 milliGRAM(s) Oral three times a day with meals    MEDICATIONS  (PRN):  dextrose 40% Gel 15 Gram(s) Oral once PRN Blood Glucose LESS THAN 70 milliGRAM(s)/deciliter  glucagon  Injectable 1 milliGRAM(s) IntraMuscular once PRN Glucose LESS THAN 70 milligrams/deciliter  oxyCODONE    5 mG/acetaminophen 325 mG 1 Tablet(s) Oral every 6 hours PRN Moderate Pain (4 - 6)      04-29-19 @ 07:01  -  04-30-19 @ 07:00  --------------------------------------------------------  IN: 0 mL / OUT: 1000 mL / NET: -1000 mL    04-30-19 @ 07:01  -  04-30-19 @ 20:57  --------------------------------------------------------  IN: 120 mL / OUT: 2000 mL / NET: -1880 mL      PHYSICAL EXAM:      T(C): 36.9 (04-30-19 @ 16:21), Max: 37 (04-30-19 @ 00:39)  HR: 86 (04-30-19 @ 16:21) (76 - 785)  BP: 121/60 (04-30-19 @ 16:21) (121/60 - 155/77)  RR: 17 (04-30-19 @ 16:21) (16 - 18)  SpO2: 93% (04-30-19 @ 16:21) (93% - 100%)  Wt(kg): --  Respiratory: clear anteriorly, decreased BS at bases  Cardiovascular: S1 S2  Gastrointestinal: soft NT ND +BS  Extremities:   1 edema                                    8.7    4.35  )-----------( 195      ( 30 Apr 2019 07:12 )             28.6     04-30    135  |  98  |  25<H>  ----------------------------<  335<H>  5.4<H>   |  26  |  4.09<H>    Ca    8.9      30 Apr 2019 07:12    TPro  7.1  /  Alb  3.5  /  TBili  0.3  /  DBili  x   /  AST  30  /  ALT  21  /  AlkPhos  81  04-29      LIVER FUNCTIONS - ( 29 Apr 2019 17:04 )  Alb: 3.5 g/dL / Pro: 7.1 gm/dL / ALK PHOS: 81 U/L / ALT: 21 U/L / AST: 30 U/L / GGT: x             Assessment and Plan:  Maintenance HD 3.5 hrs today  UF as tolerated;

## 2019-04-30 NOTE — PROGRESS NOTE ADULT - SUBJECTIVE AND OBJECTIVE BOX
Patient is a 63y old  Female who presents with a chief complaint of AMS.    INTERVAL HPI/OVERNIGHT EVENTS:  Pt was seen and examined, no acute events.    MEDICATIONS  (STANDING):  aspirin enteric coated 81 milliGRAM(s) Oral daily  atorvastatin 20 milliGRAM(s) Oral at bedtime  clopidogrel Tablet 75 milliGRAM(s) Oral daily  dextrose 5%. 1000 milliLiter(s) (50 mL/Hr) IV Continuous <Continuous>  dextrose 50% Injectable 12.5 Gram(s) IV Push once  dextrose 50% Injectable 25 Gram(s) IV Push once  dextrose 50% Injectable 25 Gram(s) IV Push once  DULoxetine 60 milliGRAM(s) Oral daily  furosemide    Tablet 40 milliGRAM(s) Oral daily  hydrALAZINE 25 milliGRAM(s) Oral three times a day  influenza   Vaccine 0.5 milliLiter(s) IntraMuscular once  insulin glargine Injectable (LANTUS) 10 Unit(s) SubCutaneous at bedtime  insulin lispro (HumaLOG) corrective regimen sliding scale   SubCutaneous three times a day before meals  insulin lispro (HumaLOG) corrective regimen sliding scale   SubCutaneous at bedtime  insulin lispro Injectable (HumaLOG) 5 Unit(s) SubCutaneous three times a day before meals  lisinopril 40 milliGRAM(s) Oral daily  multivitamin 1 Tablet(s) Oral daily  sevelamer carbonate 1600 milliGRAM(s) Oral three times a day with meals    MEDICATIONS  (PRN):  dextrose 40% Gel 15 Gram(s) Oral once PRN Blood Glucose LESS THAN 70 milliGRAM(s)/deciliter  glucagon  Injectable 1 milliGRAM(s) IntraMuscular once PRN Glucose LESS THAN 70 milligrams/deciliter  oxyCODONE    5 mG/acetaminophen 325 mG 1 Tablet(s) Oral every 6 hours PRN Moderate Pain (4 - 6)      Allergies  No Known Allergies        Vital Signs Last 24 Hrs  T(C): 36.7 (30 Apr 2019 11:20), Max: 37 (30 Apr 2019 00:39)  T(F): 98 (30 Apr 2019 11:20), Max: 98.6 (30 Apr 2019 00:39)  HR: 82 (30 Apr 2019 11:20) (78 - 785)  BP: 155/77 (30 Apr 2019 11:20) (126/58 - 160/82)  BP(mean): --  RR: 18 (30 Apr 2019 11:20) (16 - 25)  SpO2: 99% (30 Apr 2019 11:20) (96% - 100%)    PHYSICAL EXAM:  GENERAL: NAD  HEAD: Atraumatic    EYES: PERRLA  NERVOUS SYSTEM:  Awake alert  CHEST/LUNG: Clear  HEART: RRR  ABDOMEN: Soft, non tender  EXTREMITIES:  no edema      LABS:                        8.7    4.35  )-----------( 195      ( 30 Apr 2019 07:12 )             28.6     04-30    135  |  98  |  25<H>  ----------------------------<  335<H>  5.4<H>   |  26  |  4.09<H>    Ca    8.9      30 Apr 2019 07:12    TPro  7.1  /  Alb  3.5  /  TBili  0.3  /  DBili  x   /  AST  30  /  ALT  21  /  AlkPhos  81  04-29    PT/INR - ( 29 Apr 2019 14:05 )   PT: 11.0 sec;   INR: 0.98 ratio         PTT - ( 29 Apr 2019 14:05 )  PTT:26.2 sec    CAPILLARY BLOOD GLUCOSE      POCT Blood Glucose.: 383 mg/dL (30 Apr 2019 07:30)  POCT Blood Glucose.: 321 mg/dL (29 Apr 2019 21:56)  POCT Blood Glucose.: 309 mg/dL (29 Apr 2019 16:35)      RADIOLOGY & ADDITIONAL TESTS:    Imaging Personally Reviewed:  [ ] YES  [ ] NO    Consultant(s) Notes Reviewed:  [ ] YES  [ ] NO    Care Discussed with Consultants/Other Providers [ ] YES  [ ] NO

## 2019-04-30 NOTE — CONSULT NOTE ADULT - SUBJECTIVE AND OBJECTIVE BOX
HPI:    Pt is a 63 y.o. Female with PMHx of Type 1 DM, ESRD w/ HD (Mon/Tues/Thurs/Friday) for the past 5 years, Depression, COPD, hx of TIA/CVA presented with hyperglycemia. On admission, BG was 541 mg/dl and K+ 5.9.  Pt is currently receiving dialysis today. Endocrinology was consulted for management of Type 1 DM. Diagnosed w/ DM about 22 years ago. She is currently following Dr. Ley, and is on Lantus 3 units qhs, and Humalog 2-4 units with meals. She eats 5-6 times daily and checks her BG frequently. BG ranges from 100- 300's at times.  Complications from DM are neuropathy and nephropathy.  Non compliant w/ diabetic diet at home. Pt is compliant with her home insulin regimen.  Denies of any other complaints.     PAST MEDICAL & SURGICAL HISTORY:  COPD (chronic obstructive pulmonary disease)  Localized enlarged lymph nodes  CHF (congestive heart failure): EF 40-45%  Subclavian vein stenosis, left: s/p stent  DKA, type 1: 1/2015  ACS (acute coronary syndrome): 1/2015 - cath revealed 100% ostial stenosis not amenable to PCI - medical management  TIA (transient ischemic attack): x 2 - 8-9 years ago prior to ASD/VSD repair  CAD (coronary artery disease): s/p stents  Gout: past  CVA (cerebral infarction): with no residual, 8 yrs ago, prior to heart surgery - ST memory loss  Peripheral vascular disease: occluded left fem-pop bypass 5/2015  Diabetes mellitus type 1: Insulin Dependent -  ESRD (end stage renal disease): dialysis  M, tue, thursday, saturday  Hyperlipidemia  Status post device closure of ASD: &quot;clamshell&quot;  History of cardiac catheterization: 1/2015 - no intervention  S/P femoral-popliteal bypass surgery: L and R in 2013 with graft; 5/2015 CFA angioplasty left and ileofemoral endarterectomywith vein patch angioplasty of left fem-pop bypass graft  Multiple vascular surgery both leg, left fempop bypass revision 11/2015  AV (arteriovenous fistula): Left AV graft; revision with stent placement 2-3 years ago  S/P cholecystectomy      Diabetes History:    FAMILY HISTORY:  Family history of smoking  Family history of hypertension  Family history of cancer (Sibling)        Social History:    Allergies    No Known Allergies    Intolerances        MEDICATIONS  (STANDING):  aspirin enteric coated 81 milliGRAM(s) Oral daily  atorvastatin 20 milliGRAM(s) Oral at bedtime  clopidogrel Tablet 75 milliGRAM(s) Oral daily  dextrose 5%. 1000 milliLiter(s) (50 mL/Hr) IV Continuous <Continuous>  dextrose 50% Injectable 12.5 Gram(s) IV Push once  dextrose 50% Injectable 25 Gram(s) IV Push once  dextrose 50% Injectable 25 Gram(s) IV Push once  DULoxetine 60 milliGRAM(s) Oral daily  furosemide    Tablet 40 milliGRAM(s) Oral daily  hydrALAZINE 25 milliGRAM(s) Oral three times a day  influenza   Vaccine 0.5 milliLiter(s) IntraMuscular once  insulin glargine Injectable (LANTUS) 5 Unit(s) SubCutaneous at bedtime  insulin lispro (HumaLOG) corrective regimen sliding scale   SubCutaneous three times a day before meals  insulin lispro (HumaLOG) corrective regimen sliding scale   SubCutaneous at bedtime  lisinopril 40 milliGRAM(s) Oral daily  multivitamin 1 Tablet(s) Oral daily  sevelamer carbonate 1600 milliGRAM(s) Oral three times a day with meals    MEDICATIONS  (PRN):  dextrose 40% Gel 15 Gram(s) Oral once PRN Blood Glucose LESS THAN 70 milliGRAM(s)/deciliter  glucagon  Injectable 1 milliGRAM(s) IntraMuscular once PRN Glucose LESS THAN 70 milligrams/deciliter  oxyCODONE    5 mG/acetaminophen 325 mG 1 Tablet(s) Oral every 6 hours PRN Moderate Pain (4 - 6)      REVIEW OF SYSTEMS:  CONSTITUTIONAL:  as per HPI  HEENT:  Eyes:  No diplopia or blurred vision. ENT:  No earache, sore throat or runny nose.  CARDIOVASCULAR:  No pressure, squeezing, strangling, tightness, heaviness or aching about the chest, neck, axilla or epigastrium.  RESPIRATORY:  No cough, shortness of breath, PND or orthopnea.  GASTROINTESTINAL:  No nausea, vomiting or diarrhea.  GENITOURINARY:  No dysuria, frequency or urgency. No Blood in urine  MUSCULOSKELETAL:  no joint aches, no muscle pain, myalgia  SKIN:  No change in skin, hair or nails.  NEUROLOGIC:  No paresthesias, fasciculations, seizures or weakness.  PSYCHIATRIC:  No disorder of thought or mood.  ENDOCRINE:  No heat or cold intolerance, polyuria or polydipsia. abnormal weight gain or loss, oral thrush  HEMATOLOGICAL:  No easy bruising or bleeding.     T(C): 36.7 (04-30-19 @ 11:20), Max: 37 (04-30-19 @ 00:39)  HR: 82 (04-30-19 @ 11:20) (78 - 785)  BP: 155/77 (04-30-19 @ 11:20) (126/58 - 160/82)  RR: 18 (04-30-19 @ 11:20) (16 - 25)  SpO2: 99% (04-30-19 @ 11:20) (96% - 100%)  Wt(kg): --    PHYSICAL EXAM:  GENERAL: NAD, well-groomed, well-developed  HEAD:  Atraumatic, Normocephalic  EYES: EOMI, PERRLA, conjunctiva and sclera clear  NECK: Supple, Normal thyroid  NERVOUS SYSTEM:  Alert & Oriented X3  CHEST/LUNG: Clear to percussion bilaterally; No rales, rhonchi, wheezing, or rubs  HEART: Regular rate and rhythm; No murmurs, rubs, or gallops  ABDOMEN: Soft, Nontender, Nondistended; Bowel sounds present  EXTREMITIES:  +1 edema.   LYMPH: No lymphadenopathy noted      CAPILLARY BLOOD GLUCOSE      POCT Blood Glucose.: 383 mg/dL (30 Apr 2019 07:30)  POCT Blood Glucose.: 321 mg/dL (29 Apr 2019 21:56)  POCT Blood Glucose.: 309 mg/dL (29 Apr 2019 16:35)                            8.7    4.35  )-----------( 195      ( 30 Apr 2019 07:12 )             28.6       CMP:  04-30 @ 07:12  SGPT --  Albumin --   Alk Phos --   Anion Gap 11   SGOT --   Total Bili --   BUN 25   Calcium Total 8.9   CO2 26   Chloride 98   Creatinine 4.09   eGFR if AA 13   eGFR if non AA 11   Glucose 335   Potassium 5.4   Protein --   Sodium 135      Thyroid Function Tests:      Diabetes Tests: 04-30 @ 08:58 HbA1c 8.8 C-Peptide --       Parathyroids:     Adrenals:       Radiology:

## 2019-05-01 ENCOUNTER — TRANSCRIPTION ENCOUNTER (OUTPATIENT)
Age: 64
End: 2019-05-01

## 2019-05-01 VITALS
TEMPERATURE: 98 F | HEART RATE: 74 BPM | SYSTOLIC BLOOD PRESSURE: 129 MMHG | OXYGEN SATURATION: 97 % | DIASTOLIC BLOOD PRESSURE: 62 MMHG | RESPIRATION RATE: 16 BRPM

## 2019-05-01 PROCEDURE — 99239 HOSP IP/OBS DSCHRG MGMT >30: CPT

## 2019-05-01 RX ADMIN — DULOXETINE HYDROCHLORIDE 60 MILLIGRAM(S): 30 CAPSULE, DELAYED RELEASE ORAL at 11:43

## 2019-05-01 RX ADMIN — Medication 40 MILLIGRAM(S): at 05:00

## 2019-05-01 RX ADMIN — Medication 81 MILLIGRAM(S): at 11:33

## 2019-05-01 RX ADMIN — Medication 25 MILLIGRAM(S): at 05:00

## 2019-05-01 RX ADMIN — OXYCODONE AND ACETAMINOPHEN 1 TABLET(S): 5; 325 TABLET ORAL at 05:58

## 2019-05-01 RX ADMIN — CLOPIDOGREL BISULFATE 75 MILLIGRAM(S): 75 TABLET, FILM COATED ORAL at 11:33

## 2019-05-01 RX ADMIN — Medication 1 TABLET(S): at 11:33

## 2019-05-01 RX ADMIN — Medication 25 MILLIGRAM(S): at 14:30

## 2019-05-01 RX ADMIN — LISINOPRIL 40 MILLIGRAM(S): 2.5 TABLET ORAL at 05:00

## 2019-05-01 RX ADMIN — SEVELAMER CARBONATE 1600 MILLIGRAM(S): 2400 POWDER, FOR SUSPENSION ORAL at 09:18

## 2019-05-01 RX ADMIN — OXYCODONE AND ACETAMINOPHEN 1 TABLET(S): 5; 325 TABLET ORAL at 05:00

## 2019-05-01 RX ADMIN — SEVELAMER CARBONATE 1600 MILLIGRAM(S): 2400 POWDER, FOR SUSPENSION ORAL at 11:33

## 2019-05-01 NOTE — DISCHARGE NOTE NURSING/CASE MANAGEMENT/SOCIAL WORK - NSDCDPATPORTLINK_GEN_ALL_CORE
You can access the [facility], offered by [facility], by registering with the following website: [email or web address]

## 2019-05-01 NOTE — PROGRESS NOTE ADULT - SUBJECTIVE AND OBJECTIVE BOX
Patient is a 63y old  Female who presents with a chief complaint of     Interval History: finger sticks are stable and < 150 now   decreased glucose toxicity   on Lantus 10 units and prandial lispro 5 units     MEDICATIONS  (STANDING):  aspirin enteric coated 81 milliGRAM(s) Oral daily  atorvastatin 20 milliGRAM(s) Oral at bedtime  clopidogrel Tablet 75 milliGRAM(s) Oral daily  dextrose 5%. 1000 milliLiter(s) (50 mL/Hr) IV Continuous <Continuous>  dextrose 50% Injectable 12.5 Gram(s) IV Push once  dextrose 50% Injectable 25 Gram(s) IV Push once  dextrose 50% Injectable 25 Gram(s) IV Push once  DULoxetine 60 milliGRAM(s) Oral daily  furosemide    Tablet 40 milliGRAM(s) Oral daily  hydrALAZINE 25 milliGRAM(s) Oral three times a day  influenza   Vaccine 0.5 milliLiter(s) IntraMuscular once  insulin glargine Injectable (LANTUS) 10 Unit(s) SubCutaneous at bedtime  insulin lispro (HumaLOG) corrective regimen sliding scale   SubCutaneous three times a day before meals  insulin lispro (HumaLOG) corrective regimen sliding scale   SubCutaneous at bedtime  insulin lispro Injectable (HumaLOG) 5 Unit(s) SubCutaneous three times a day before meals  lisinopril 40 milliGRAM(s) Oral daily  multivitamin 1 Tablet(s) Oral daily  sevelamer carbonate 1600 milliGRAM(s) Oral three times a day with meals    MEDICATIONS  (PRN):  dextrose 40% Gel 15 Gram(s) Oral once PRN Blood Glucose LESS THAN 70 milliGRAM(s)/deciliter  glucagon  Injectable 1 milliGRAM(s) IntraMuscular once PRN Glucose LESS THAN 70 milligrams/deciliter  oxyCODONE    5 mG/acetaminophen 325 mG 1 Tablet(s) Oral every 6 hours PRN Moderate Pain (4 - 6)      Allergies    No Known Allergies    Intolerances        REVIEW OF SYSTEMS:  CONSTITUTIONAL: no changes  EYES: No eye pain, visual disturbances, or discharge  ENMT:  No difficulty hearing, No sinus or throat pain  NECK: No pain or stiffness  RESPIRATORY: No cough, wheezing, chills or hemoptysis; No shortness of breath  CARDIOVASCULAR: No chest pain, palpitations or leg swelling  GASTROINTESTINAL: No abdominal or epigastric pain. No nausea, vomiting, or hematemesis; No diarrhea or constipation. No melena or hematochezia.  GENITOURINARY: No dysuria, frequency, hematuria, or incontinence  NEUROLOGICAL: No headaches, memory loss, loss of strength, numbness, or tremors  SKIN: No itching, burning, rashes, or lesions   ENDOCRINE: No heat or cold intolerance; No hair loss  MUSCULOSKELETAL: No joint pain or swelling; No muscle, back, or extremity pain  PSYCHIATRIC: No depression, anxiety, mood swings, or difficulty sleeping  HEME/LYMPH: No easy bruising, or bleeding gums  ALLERY AND IMMUNOLOGIC: No hives or eczema    Vital Signs Last 24 Hrs  T(C): 36.7 (01 May 2019 05:12), Max: 36.9 (30 Apr 2019 16:21)  T(F): 98 (01 May 2019 05:12), Max: 98.5 (01 May 2019 00:28)  HR: 85 (01 May 2019 05:12) (76 - 86)  BP: 153/83 (01 May 2019 05:12) (121/60 - 155/77)  BP(mean): --  RR: 17 (01 May 2019 05:12) (16 - 18)  SpO2: 96% (01 May 2019 05:12) (93% - 99%)    PHYSICAL EXAM:  GENERAL:   HEAD: Atraumatic, Normocephalic  EYES: PERRLA, conjunctiva and sclera clear  ENMT: No tonsillar erythema, exudates, or enlargement; Moist mucous membranes, Good dentition, No lesions  NECK: Supple, No JVD, Normal thyroid  NERVOUS SYSTEM:  Alert & Oriented X3, Good concentration; Motor Strength 5/5 B/L upper and lower extremities  CHEST/LUNG: Clear to auscultaion bilaterally; No rales, rhonchi, wheezing, or rubs  HEART: Regular rate and rhythm; No murmurs, rubs, or gallops  ABDOMEN: Soft, Nontender, Nondistended; Bowel sounds present  EXTREMITIES:  2+ Peripheral Pulses, no edema  SKIN: No rashes or lesions    LABS:    PT/INR - ( 29 Apr 2019 14:05 )   PT: 11.0 sec;   INR: 0.98 ratio         PTT - ( 29 Apr 2019 14:05 )  PTT:26.2 sec    CAPILLARY BLOOD GLUCOSE      POCT Blood Glucose.: 98 mg/dL (01 May 2019 07:23)  POCT Blood Glucose.: 191 mg/dL (30 Apr 2019 21:14)  POCT Blood Glucose.: 202 mg/dL (30 Apr 2019 16:54)    Lipid panel:   CARDIAC MARKERS ( 29 Apr 2019 14:06 )  x     / x     / 146 U/L / x     / 3.9 ng/mL          Thyroid:  Diabetes Tests:  Parathyroid Panel:  Adrenals:  RADIOLOGY & ADDITIONAL TESTS:    Imaging Personally Reviewed:  [ ] YES  [ ] NO    Consultant(s) Notes Reviewed:  [ ] YES  [ ] NO    Care Discussed with Consultants/Other Providers [ ] YES  [ ] NO

## 2019-05-01 NOTE — DISCHARGE NOTE PROVIDER - NSDCCPCAREPLAN_GEN_ALL_CORE_FT
PRINCIPAL DISCHARGE DIAGNOSIS  Diagnosis: Hyperglycemia  Assessment and Plan of Treatment:       SECONDARY DISCHARGE DIAGNOSES  Diagnosis: Altered mental status  Assessment and Plan of Treatment: PRINCIPAL DISCHARGE DIAGNOSIS  Diagnosis: Hyperglycemia  Assessment and Plan of Treatment: Resolved.  Follow up with endocrinology      SECONDARY DISCHARGE DIAGNOSES  Diagnosis: Metabolic encephalopathy  Assessment and Plan of Treatment: resolved    Diagnosis: Type 1 diabetes mellitus with chronic kidney disease on chronic dialysis  Assessment and Plan of Treatment: Follow    Diagnosis: Hyperlipidemia, unspecified hyperlipidemia type  Assessment and Plan of Treatment: Hyperlipidemia, unspecified hyperlipidemia type    Diagnosis: ESRD (end stage renal disease)  Assessment and Plan of Treatment: ESRD (end stage renal disease) PRINCIPAL DISCHARGE DIAGNOSIS  Diagnosis: Hyperglycemia  Assessment and Plan of Treatment: Resolved.  Follow up with endocrinology      SECONDARY DISCHARGE DIAGNOSES  Diagnosis: Metabolic encephalopathy  Assessment and Plan of Treatment: resolved    Diagnosis: Type 1 diabetes mellitus with chronic kidney disease on chronic dialysis  Assessment and Plan of Treatment: Follow up with endo, resume home med    Diagnosis: Hyperlipidemia, unspecified hyperlipidemia type  Assessment and Plan of Treatment: continue home med    Diagnosis: ESRD (end stage renal disease)  Assessment and Plan of Treatment: continue HD per schedule

## 2019-05-01 NOTE — DISCHARGE NOTE PROVIDER - HOSPITAL COURSE
63f with Altered Mental Status                  Problem/Plan - 1:    ·  Problem: Hyperglycemia.  Plan: glucose uncontrolled    increase Lantus to 10 units QHS, started premeal insulin    Endo follow up.          Problem/Plan - 2:    ·  Problem: Metabolic encephalopathy.  Plan: resolved.          Problem/Plan - 3:    ·  Problem: ESRD (end stage renal disease).  Plan: hemodialysis as per renal.          Problem/Plan - 4:    ·  Problem: Hyperlipidemia, unspecified hyperlipidemia type.  Plan: continue home meds.          Problem/Plan - 5:    ·  Problem: Type 1 diabetes mellitus with chronic kidney disease on chronic dialysis.  Plan: Lantus , premeal insulin 63 yrs old female with Altered Mental Status.             Problem/Plan - 1:    ·  Problem: Hyperglycemia.  Plan: glucose uncontrolled    increase Lantus to 10 units QHS, started premeal insulin    Endo follow up.          Problem/Plan - 2:    ·  Problem: Metabolic encephalopathy.  Plan: resolved.          Problem/Plan - 3:    ·  Problem: ESRD (end stage renal disease).  Plan: hemodialysis as per renal.          Problem/Plan - 4:    ·  Problem: Hyperlipidemia, unspecified hyperlipidemia type.  Plan: continue home meds.          Problem/Plan - 5:    ·  Problem: Type 1 diabetes mellitus with chronic kidney disease on chronic dialysis.  Plan: Lantus , premeal insulin

## 2019-05-01 NOTE — DISCHARGE NOTE PROVIDER - PROVIDER TOKENS
FREE:[LAST:[primary doctor],PHONE:[(   )    -],FAX:[(   )    -]],FREE:[LAST:[primary nephrologist],PHONE:[(   )    -],FAX:[(   )    -]]

## 2019-05-01 NOTE — PROGRESS NOTE ADULT - PROBLEM SELECTOR PLAN 1
Continue with the same regimen while inpatient   patient can be discharged on the same regimen or Patient can resume  home regimen upon discharge

## 2019-05-01 NOTE — DISCHARGE NOTE PROVIDER - CARE PROVIDER_API CALL
primary doctor,   Phone: (   )    -  Fax: (   )    -  Follow Up Time:     primary nephrologist,   Phone: (   )    -  Fax: (   )    -  Follow Up Time:

## 2019-05-07 DIAGNOSIS — Z90.49 ACQUIRED ABSENCE OF OTHER SPECIFIED PARTS OF DIGESTIVE TRACT: ICD-10-CM

## 2019-05-07 DIAGNOSIS — E10.22 TYPE 1 DIABETES MELLITUS WITH DIABETIC CHRONIC KIDNEY DISEASE: ICD-10-CM

## 2019-05-07 DIAGNOSIS — I73.9 PERIPHERAL VASCULAR DISEASE, UNSPECIFIED: ICD-10-CM

## 2019-05-07 DIAGNOSIS — G93.41 METABOLIC ENCEPHALOPATHY: ICD-10-CM

## 2019-05-07 DIAGNOSIS — E10.21 TYPE 1 DIABETES MELLITUS WITH DIABETIC NEPHROPATHY: ICD-10-CM

## 2019-05-07 DIAGNOSIS — E10.65 TYPE 1 DIABETES MELLITUS WITH HYPERGLYCEMIA: ICD-10-CM

## 2019-05-07 DIAGNOSIS — J44.9 CHRONIC OBSTRUCTIVE PULMONARY DISEASE, UNSPECIFIED: ICD-10-CM

## 2019-05-07 DIAGNOSIS — F32.9 MAJOR DEPRESSIVE DISORDER, SINGLE EPISODE, UNSPECIFIED: ICD-10-CM

## 2019-05-07 DIAGNOSIS — Z79.4 LONG TERM (CURRENT) USE OF INSULIN: ICD-10-CM

## 2019-05-07 DIAGNOSIS — Z91.11 PATIENT'S NONCOMPLIANCE WITH DIETARY REGIMEN: ICD-10-CM

## 2019-05-07 DIAGNOSIS — N18.6 END STAGE RENAL DISEASE: ICD-10-CM

## 2019-05-07 DIAGNOSIS — Z86.73 PERSONAL HISTORY OF TRANSIENT ISCHEMIC ATTACK (TIA), AND CEREBRAL INFARCTION WITHOUT RESIDUAL DEFICITS: ICD-10-CM

## 2019-05-07 DIAGNOSIS — I24.9 ACUTE ISCHEMIC HEART DISEASE, UNSPECIFIED: ICD-10-CM

## 2019-05-07 DIAGNOSIS — E10.40 TYPE 1 DIABETES MELLITUS WITH DIABETIC NEUROPATHY, UNSPECIFIED: ICD-10-CM

## 2019-05-07 DIAGNOSIS — Z79.82 LONG TERM (CURRENT) USE OF ASPIRIN: ICD-10-CM

## 2019-05-07 DIAGNOSIS — Z99.2 DEPENDENCE ON RENAL DIALYSIS: ICD-10-CM

## 2019-05-07 DIAGNOSIS — I25.10 ATHEROSCLEROTIC HEART DISEASE OF NATIVE CORONARY ARTERY WITHOUT ANGINA PECTORIS: ICD-10-CM

## 2019-05-07 DIAGNOSIS — E87.5 HYPERKALEMIA: ICD-10-CM

## 2019-05-07 DIAGNOSIS — E78.5 HYPERLIPIDEMIA, UNSPECIFIED: ICD-10-CM

## 2019-05-11 ENCOUNTER — EMERGENCY (EMERGENCY)
Facility: HOSPITAL | Age: 64
LOS: 1 days | Discharge: HOME | End: 2019-05-11
Attending: EMERGENCY MEDICINE
Payer: MEDICARE

## 2019-05-11 VITALS
TEMPERATURE: 98 F | DIASTOLIC BLOOD PRESSURE: 73 MMHG | HEART RATE: 83 BPM | SYSTOLIC BLOOD PRESSURE: 151 MMHG | OXYGEN SATURATION: 100 % | RESPIRATION RATE: 18 BRPM

## 2019-05-11 VITALS
RESPIRATION RATE: 20 BRPM | TEMPERATURE: 98 F | DIASTOLIC BLOOD PRESSURE: 82 MMHG | OXYGEN SATURATION: 100 % | SYSTOLIC BLOOD PRESSURE: 157 MMHG | HEART RATE: 104 BPM

## 2019-05-11 DIAGNOSIS — Z98.89 OTHER SPECIFIED POSTPROCEDURAL STATES: Chronic | ICD-10-CM

## 2019-05-11 LAB
ALBUMIN SERPL ELPH-MCNC: 4 G/DL — SIGNIFICANT CHANGE UP (ref 3.3–5)
ALP SERPL-CCNC: 78 U/L — SIGNIFICANT CHANGE UP (ref 40–120)
ALT FLD-CCNC: 23 U/L — SIGNIFICANT CHANGE UP (ref 10–45)
ANION GAP SERPL CALC-SCNC: 15 MMOL/L — SIGNIFICANT CHANGE UP (ref 5–17)
AST SERPL-CCNC: 26 U/L — SIGNIFICANT CHANGE UP (ref 10–40)
B-OH-BUTYR SERPL-SCNC: 0.1 MMOL/L — SIGNIFICANT CHANGE UP
BASOPHILS # BLD AUTO: 0 K/UL — SIGNIFICANT CHANGE UP (ref 0–0.2)
BASOPHILS NFR BLD AUTO: 0.6 % — SIGNIFICANT CHANGE UP (ref 0–2)
BILIRUB SERPL-MCNC: 0.2 MG/DL — SIGNIFICANT CHANGE UP (ref 0.2–1.2)
BUN SERPL-MCNC: 21 MG/DL — SIGNIFICANT CHANGE UP (ref 7–23)
CALCIUM SERPL-MCNC: 9.5 MG/DL — SIGNIFICANT CHANGE UP (ref 8.4–10.5)
CHLORIDE SERPL-SCNC: 90 MMOL/L — LOW (ref 96–108)
CO2 SERPL-SCNC: 29 MMOL/L — SIGNIFICANT CHANGE UP (ref 22–31)
CREAT SERPL-MCNC: 2.99 MG/DL — HIGH (ref 0.5–1.3)
EOSINOPHIL # BLD AUTO: 0.1 K/UL — SIGNIFICANT CHANGE UP (ref 0–0.5)
EOSINOPHIL NFR BLD AUTO: 0.8 % — SIGNIFICANT CHANGE UP (ref 0–6)
GAS PNL BLDV: SIGNIFICANT CHANGE UP
GLUCOSE SERPL-MCNC: 345 MG/DL — HIGH (ref 70–99)
HCT VFR BLD CALC: 28.6 % — LOW (ref 34.5–45)
HGB BLD-MCNC: 9.6 G/DL — LOW (ref 11.5–15.5)
LACTATE BLDV-MCNC: 2.2 MMOL/L — HIGH (ref 0.7–2)
LIDOCAIN IGE QN: 75 U/L — HIGH (ref 7–60)
LYMPHOCYTES # BLD AUTO: 0.9 K/UL — LOW (ref 1–3.3)
LYMPHOCYTES # BLD AUTO: 11.5 % — LOW (ref 13–44)
MCHC RBC-ENTMCNC: 33.7 GM/DL — SIGNIFICANT CHANGE UP (ref 32–36)
MCHC RBC-ENTMCNC: 35 PG — HIGH (ref 27–34)
MCV RBC AUTO: 104 FL — HIGH (ref 80–100)
MONOCYTES # BLD AUTO: 0.7 K/UL — SIGNIFICANT CHANGE UP (ref 0–0.9)
MONOCYTES NFR BLD AUTO: 8.6 % — SIGNIFICANT CHANGE UP (ref 2–14)
NEUTROPHILS # BLD AUTO: 6.1 K/UL — SIGNIFICANT CHANGE UP (ref 1.8–7.4)
NEUTROPHILS NFR BLD AUTO: 78.5 % — HIGH (ref 43–77)
PLATELET # BLD AUTO: 219 K/UL — SIGNIFICANT CHANGE UP (ref 150–400)
POTASSIUM SERPL-MCNC: 5.1 MMOL/L — SIGNIFICANT CHANGE UP (ref 3.5–5.3)
POTASSIUM SERPL-SCNC: 5.1 MMOL/L — SIGNIFICANT CHANGE UP (ref 3.5–5.3)
PROT SERPL-MCNC: 6.5 G/DL — SIGNIFICANT CHANGE UP (ref 6–8.3)
RBC # BLD: 2.76 M/UL — LOW (ref 3.8–5.2)
RBC # FLD: 13.9 % — SIGNIFICANT CHANGE UP (ref 10.3–14.5)
SODIUM SERPL-SCNC: 134 MMOL/L — LOW (ref 135–145)
TROPONIN T, HIGH SENSITIVITY RESULT: 234 NG/L — HIGH (ref 0–51)
TROPONIN T, HIGH SENSITIVITY RESULT: 237 NG/L — HIGH (ref 0–51)
WBC # BLD: 7.7 K/UL — SIGNIFICANT CHANGE UP (ref 3.8–10.5)
WBC # FLD AUTO: 7.7 K/UL — SIGNIFICANT CHANGE UP (ref 3.8–10.5)

## 2019-05-11 PROCEDURE — 80053 COMPREHEN METABOLIC PANEL: CPT

## 2019-05-11 PROCEDURE — 84484 ASSAY OF TROPONIN QUANT: CPT

## 2019-05-11 PROCEDURE — 71045 X-RAY EXAM CHEST 1 VIEW: CPT | Mod: 26

## 2019-05-11 PROCEDURE — 84295 ASSAY OF SERUM SODIUM: CPT

## 2019-05-11 PROCEDURE — 74176 CT ABD & PELVIS W/O CONTRAST: CPT

## 2019-05-11 PROCEDURE — 71045 X-RAY EXAM CHEST 1 VIEW: CPT

## 2019-05-11 PROCEDURE — 83605 ASSAY OF LACTIC ACID: CPT

## 2019-05-11 PROCEDURE — 82010 KETONE BODYS QUAN: CPT

## 2019-05-11 PROCEDURE — 99285 EMERGENCY DEPT VISIT HI MDM: CPT | Mod: 25

## 2019-05-11 PROCEDURE — 99284 EMERGENCY DEPT VISIT MOD MDM: CPT

## 2019-05-11 PROCEDURE — 82330 ASSAY OF CALCIUM: CPT

## 2019-05-11 PROCEDURE — 83690 ASSAY OF LIPASE: CPT

## 2019-05-11 PROCEDURE — 93005 ELECTROCARDIOGRAM TRACING: CPT

## 2019-05-11 PROCEDURE — 93010 ELECTROCARDIOGRAM REPORT: CPT

## 2019-05-11 PROCEDURE — 82435 ASSAY OF BLOOD CHLORIDE: CPT

## 2019-05-11 PROCEDURE — 82962 GLUCOSE BLOOD TEST: CPT

## 2019-05-11 PROCEDURE — 82947 ASSAY GLUCOSE BLOOD QUANT: CPT

## 2019-05-11 PROCEDURE — 85027 COMPLETE CBC AUTOMATED: CPT

## 2019-05-11 PROCEDURE — 82803 BLOOD GASES ANY COMBINATION: CPT

## 2019-05-11 PROCEDURE — 85014 HEMATOCRIT: CPT

## 2019-05-11 PROCEDURE — 74176 CT ABD & PELVIS W/O CONTRAST: CPT | Mod: 26

## 2019-05-11 PROCEDURE — 84132 ASSAY OF SERUM POTASSIUM: CPT

## 2019-05-11 RX ORDER — INSULIN HUMAN 100 [IU]/ML
3 INJECTION, SOLUTION SUBCUTANEOUS ONCE
Refills: 0 | Status: COMPLETED | OUTPATIENT
Start: 2019-05-11 | End: 2019-05-11

## 2019-05-11 RX ORDER — SODIUM CHLORIDE 9 MG/ML
250 INJECTION INTRAMUSCULAR; INTRAVENOUS; SUBCUTANEOUS ONCE
Refills: 0 | Status: DISCONTINUED | OUTPATIENT
Start: 2019-05-11 | End: 2019-05-11

## 2019-05-11 RX ADMIN — INSULIN HUMAN 3 UNIT(S): 100 INJECTION, SOLUTION SUBCUTANEOUS at 20:51

## 2019-05-11 NOTE — ED PROVIDER NOTE - CLINICAL SUMMARY MEDICAL DECISION MAKING FREE TEXT BOX
JPATEL: 62 y/o F with PMHx of COPD, CHF, CAD, ACS with stent, TIA, gout, PVD, IDDM, with multiple episodes of DKA, HLD, ESRD does not make urine p/w abdominal distension today, as per  pt not herself, but with abdominal tenderness, sugars were high this morning, no fever, no CP, no SOB, no cough, pt does not make urine today,  as per  he is concerned pt has DKA1)CBC, CMP, CE, VBG 2)EKG, CXR 3)noncon CT of abd 4)Reevaluate

## 2019-05-11 NOTE — ED PROVIDER NOTE - NSFOLLOWUPINSTRUCTIONS_ED_ALL_ED_FT
1) Follow-up with your primary care provider in 1-2 days. Follow-up for hemodialysis as directed. Additionally follow-up with your endocrinologist within 1 week for better blood glucose control.    2) Continue to take all medications as prescribed.    3) Return to the ER for any new or worsening symptoms.

## 2019-05-11 NOTE — ED PROVIDER NOTE - PROGRESS NOTE DETAILS
Pt feeling well, pain free without meds for over 1 hour, tolerating PO without pain, no nausea or vomiting Troponin 234 in setting of ESRD will repeat, lipase mildly elevated but pt is s/p HD today likely contributing, VBG c/w respiratory acidosis with complete metabolic compensation in the setting of COPD unchanged from previous VBGs when pt is not in DKA, no anion gap or ketosis, anemia at baseline, lactate 3.1 without any other signs/sxs/lab abnormalities to suggest sepsis, CT A/P showing only fluid in b/l lung bases. pt not c/o SOB, not requiring additional O2, ambulating up and down hallway without SOB. Given pt with h/o HD and CHF already received 200ml IVF en route by EMS and is tolerating PO drinking water and diet ginger ale will hold off on fluids and repeat pt lactate and troponin. -Zach Ding PA-C

## 2019-05-11 NOTE — ED PROVIDER NOTE - PHYSICAL EXAMINATION
GEN: Pt in NAD, A&O x3.  PSYCH: Affect appropriate.  EYES: Sclera white w/o injection.   ENT: Head NCAT. Nose w/o deformity. No auricular TTP. MMM. Neck supple FROM.   RESP: No chest wall tenderness, b/l basilar crackles and decreased lung sounds, otherwise CTA b/l.  CARDIAC: RRR, clear distinct S1, S2, no appreciable murmurs.  ABD: Abdomen mildly distended, NABS, TTP primarily in RUQ and epigastrum, no rebound.  VASC: 2+ radial and dorsalis pedis pulses b/l. No edema or calf tenderness.  MSK: No obvious deformity of spine, no step-offs, no midline ttp. paraspinal ttp b/l thoracolumbar region, pain worsened with forward flexion.  SKIN: No rashes on the trunk. GEN: Pt in NAD, A&O x3.  PSYCH: Affect appropriate.  EYES: Sclera white w/o injection.   ENT: Head NCAT. Nose w/o deformity. No auricular TTP. MMM. Neck supple FROM.   RESP: No chest wall tenderness, b/l basilar crackles and decreased lung sounds, otherwise CTA b/l.  CARDIAC: RRR, clear distinct S1, S2, no appreciable murmurs.  ABD: Abdomen mildly distended, NABS, TTP primarily in RUQ and epigastrum, no rebound.  VASC: 2+ dorsalis pedis pulses b/l.  MSK: No obvious deformity of spine, no step-offs, no midline ttp. paraspinal ttp b/l thoracolumbar region, pain worsened with forward flexion.  SKIN: No rashes on the trunk.

## 2019-05-11 NOTE — ED PROVIDER NOTE - ATTENDING CONTRIBUTION TO CARE
Attending MD Fajardo;:   I personally have seen and examined this patient.  Physician assistant note reviewed and agree on plan of care and except where noted.  See MDM for details.

## 2019-05-11 NOTE — ED ADULT NURSE REASSESSMENT NOTE - NS ED NURSE REASSESS COMMENT FT1
Report received from Ayesha CARMONA. Pt AAOx4, NAD, resp nonlabored, skin warm/dry, resting comfortably in bed with family at bedside. Pt c/o feeling hungry. Pt denies headache, dizziness, chest pain, palpitations, SOB, n/v/d, urinary symptoms, fevers, chills, weakness at this time. Pt awaiting test results . Safety maintained.

## 2019-05-11 NOTE — ED ADULT NURSE NOTE - NSIMPLEMENTINTERV_GEN_ALL_ED
Implemented All Universal Safety Interventions:  [place] to call system. Call bell, personal items and telephone within reach. Instruct patient to call for assistance. Room bathroom lighting operational. Non-slip footwear when patient is off stretcher. Physically safe environment: no spills, clutter or unnecessary equipment. Stretcher in lowest position, wheels locked, appropriate side rails in place.

## 2019-05-11 NOTE — ED PROVIDER NOTE - PLAN OF CARE
1) Follow-up with your primary care provider in 1-2 days.  2) Continue to take all medications as prescribed.  3) Rest and drink plenty of fluids.  4) Return to the ER for any new or worsening symptoms. 1) Follow-up with your primary care provider in 1-2 days. Follow-up for hemodialysis as directed. Additionally follow-up with your endocrinologist within 1 week for better blood glucose control.  2) Continue to take all medications as prescribed.  3) Return to the ER for any new or worsening symptoms.

## 2019-05-11 NOTE — ED PROVIDER NOTE - CARE PLAN
Principal Discharge DX:	Abdominal pain  Assessment and plan of treatment:	1) Follow-up with your primary care provider in 1-2 days.  2) Continue to take all medications as prescribed.  3) Rest and drink plenty of fluids.  4) Return to the ER for any new or worsening symptoms.  Secondary Diagnosis:	Hyperglycemia Principal Discharge DX:	Abdominal pain  Assessment and plan of treatment:	1) Follow-up with your primary care provider in 1-2 days. Follow-up for hemodialysis as directed. Additionally follow-up with your endocrinologist within 1 week for better blood glucose control.  2) Continue to take all medications as prescribed.  3) Return to the ER for any new or worsening symptoms.  Secondary Diagnosis:	Hyperglycemia

## 2019-05-11 NOTE — ED ADULT NURSE NOTE - OBJECTIVE STATEMENT
63 yr old female on dialysis 4 days a week had her 3rd today  but states she is sob and her stomach hurts and is swollen. on assessment a and o x 3 lungs clear abd soft non tender no swelling in extremities, tummy looks slightly bloated. on o2 as it is making her feel better. sugars have been running high in 400s for a few days, received 3 units of insuling humalog today.

## 2019-05-11 NOTE — ED PROVIDER NOTE - OBJECTIVE STATEMENT
64 y/o F with PMHx of COPD not on home O2, CHF EF 40-45% on Lasix 40mg, CAD, ACS with stent, TIA, gout, PVD, T1IDDM with multiple prior episodes of DKA, HLD, ESRD does not make urine on HD Mon/Tues/Thurs/Sat BIBEMS c/o abdominal bloating and ttp after dialysis today. As per , pt returned home after completing dialysis today, noted abdominal distension and wife was "not acting herself"/ lethargic, not c/o abdominal pain but had tenderness to palpation, FSG was in the "high 400s"  gave pt 3 U of Regular and called EMS, received 200ml NS en route.  notes pt mental status back to baseline now, she is improved but with abdo distension. Pt also noted back pain when EMS was moving pt to stretcher, paraspinal upper and mid back. Pt denies f/c, recent illness, CP, SOB, abd pain at rest, n/v/d/c, syncope, PUD/gastritis, pancreatitis, etoh abuse. Last BM yesterday formed brown normal for pt, no melena or hematochezia. Still passing flatus. +abdominal surgeries (lap idania) no h/o SBO.

## 2019-05-13 NOTE — ED ADULT NURSE NOTE - PMH
ED Record Reviewed
ACS (acute coronary syndrome)  1/2015 - cath revealed 100% ostial stenosis not amenable to PCI - medical management  CAD (coronary artery disease)  s/p stents  Cellulitis  2015 left foot  CVA (cerebral infarction)  with no residual, 8 yrs ago, prior to heart surgery - ST memory loss  Diabetes mellitus type 1  Insulin Dependent - Medtronic  Minimed Paradigm Insulin Pump - Novolog  DKA, type 1  1/2015  ESRD (end stage renal disease)  dialysis , tue, thursday, saturday  Gout  past  Hyperlipidemia    MI, old    Murmur, cardiac    Peripheral vascular disease  occluded left fem-pop bypass 5/2015  Subclavian vein stenosis, left  s/p stent  TIA (transient ischemic attack)  x 2 - 8-9 years ago prior to ASD/VSD repair

## 2019-05-16 ENCOUNTER — INPATIENT (INPATIENT)
Facility: HOSPITAL | Age: 64
LOS: 4 days | Discharge: HOME | DRG: 291 | End: 2019-05-21
Attending: INTERNAL MEDICINE | Admitting: INTERNAL MEDICINE
Payer: MEDICARE

## 2019-05-16 VITALS
DIASTOLIC BLOOD PRESSURE: 64 MMHG | HEART RATE: 70 BPM | HEIGHT: 64 IN | WEIGHT: 110.23 LBS | SYSTOLIC BLOOD PRESSURE: 120 MMHG | RESPIRATION RATE: 18 BRPM | OXYGEN SATURATION: 100 %

## 2019-05-16 DIAGNOSIS — E78.5 HYPERLIPIDEMIA, UNSPECIFIED: ICD-10-CM

## 2019-05-16 DIAGNOSIS — Z98.89 OTHER SPECIFIED POSTPROCEDURAL STATES: Chronic | ICD-10-CM

## 2019-05-16 DIAGNOSIS — R09.89 OTHER SPECIFIED SYMPTOMS AND SIGNS INVOLVING THE CIRCULATORY AND RESPIRATORY SYSTEMS: ICD-10-CM

## 2019-05-16 DIAGNOSIS — E87.5 HYPERKALEMIA: ICD-10-CM

## 2019-05-16 DIAGNOSIS — E10.65 TYPE 1 DIABETES MELLITUS WITH HYPERGLYCEMIA: ICD-10-CM

## 2019-05-16 DIAGNOSIS — I10 ESSENTIAL (PRIMARY) HYPERTENSION: ICD-10-CM

## 2019-05-16 LAB
ALBUMIN SERPL ELPH-MCNC: 4.5 G/DL — SIGNIFICANT CHANGE UP (ref 3.3–5)
ALP SERPL-CCNC: 100 U/L — SIGNIFICANT CHANGE UP (ref 40–120)
ALT FLD-CCNC: 72 U/L — HIGH (ref 10–45)
ANION GAP SERPL CALC-SCNC: 21 MMOL/L — HIGH (ref 5–17)
ANION GAP SERPL CALC-SCNC: 22 MMOL/L — HIGH (ref 5–17)
AST SERPL-CCNC: 144 U/L — HIGH (ref 10–40)
B-OH-BUTYR SERPL-SCNC: 1 MMOL/L — HIGH
BASE EXCESS BLDV CALC-SCNC: 2.9 MMOL/L — HIGH (ref -2–2)
BASE EXCESS BLDV CALC-SCNC: 3.4 MMOL/L — HIGH (ref -2–2)
BILIRUB SERPL-MCNC: 0.5 MG/DL — SIGNIFICANT CHANGE UP (ref 0.2–1.2)
BUN SERPL-MCNC: 58 MG/DL — HIGH (ref 7–23)
BUN SERPL-MCNC: 61 MG/DL — HIGH (ref 7–23)
CA-I SERPL-SCNC: 1.2 MMOL/L — SIGNIFICANT CHANGE UP (ref 1.12–1.3)
CA-I SERPL-SCNC: 1.21 MMOL/L — SIGNIFICANT CHANGE UP (ref 1.12–1.3)
CALCIUM SERPL-MCNC: 10.1 MG/DL — SIGNIFICANT CHANGE UP (ref 8.4–10.5)
CALCIUM SERPL-MCNC: 10.4 MG/DL — SIGNIFICANT CHANGE UP (ref 8.4–10.5)
CHLORIDE BLDV-SCNC: 91 MMOL/L — LOW (ref 96–108)
CHLORIDE BLDV-SCNC: 91 MMOL/L — LOW (ref 96–108)
CHLORIDE SERPL-SCNC: 88 MMOL/L — LOW (ref 96–108)
CHLORIDE SERPL-SCNC: 89 MMOL/L — LOW (ref 96–108)
CO2 BLDV-SCNC: 30 MMOL/L — SIGNIFICANT CHANGE UP (ref 22–30)
CO2 BLDV-SCNC: 30 MMOL/L — SIGNIFICANT CHANGE UP (ref 22–30)
CO2 SERPL-SCNC: 22 MMOL/L — SIGNIFICANT CHANGE UP (ref 22–31)
CO2 SERPL-SCNC: 23 MMOL/L — SIGNIFICANT CHANGE UP (ref 22–31)
CREAT SERPL-MCNC: 5.51 MG/DL — HIGH (ref 0.5–1.3)
CREAT SERPL-MCNC: 5.76 MG/DL — HIGH (ref 0.5–1.3)
GAS PNL BLDV: 129 MMOL/L — LOW (ref 136–145)
GAS PNL BLDV: 130 MMOL/L — LOW (ref 136–145)
GAS PNL BLDV: SIGNIFICANT CHANGE UP
GLUCOSE BLDC GLUCOMTR-MCNC: 142 MG/DL — HIGH (ref 70–99)
GLUCOSE BLDC GLUCOMTR-MCNC: 278 MG/DL — HIGH (ref 70–99)
GLUCOSE BLDC GLUCOMTR-MCNC: 326 MG/DL — HIGH (ref 70–99)
GLUCOSE BLDV-MCNC: 420 MG/DL — HIGH (ref 70–99)
GLUCOSE BLDV-MCNC: 477 MG/DL — CRITICAL HIGH (ref 70–99)
GLUCOSE SERPL-MCNC: 469 MG/DL — CRITICAL HIGH (ref 70–99)
GLUCOSE SERPL-MCNC: 517 MG/DL — CRITICAL HIGH (ref 70–99)
HCO3 BLDV-SCNC: 28 MMOL/L — SIGNIFICANT CHANGE UP (ref 21–29)
HCO3 BLDV-SCNC: 29 MMOL/L — SIGNIFICANT CHANGE UP (ref 21–29)
HCT VFR BLD CALC: 32.8 % — LOW (ref 34.5–45)
HCT VFR BLDA CALC: 29 % — LOW (ref 39–50)
HCT VFR BLDA CALC: 32 % — LOW (ref 39–50)
HGB BLD CALC-MCNC: 10.2 G/DL — LOW (ref 11.5–15.5)
HGB BLD CALC-MCNC: 9.3 G/DL — LOW (ref 11.5–15.5)
HGB BLD-MCNC: 10.7 G/DL — LOW (ref 11.5–15.5)
LACTATE BLDV-MCNC: 1.6 MMOL/L — SIGNIFICANT CHANGE UP (ref 0.7–2)
LACTATE BLDV-MCNC: 2 MMOL/L — SIGNIFICANT CHANGE UP (ref 0.7–2)
MCHC RBC-ENTMCNC: 32.6 GM/DL — SIGNIFICANT CHANGE UP (ref 32–36)
MCHC RBC-ENTMCNC: 34.1 PG — HIGH (ref 27–34)
MCV RBC AUTO: 104 FL — HIGH (ref 80–100)
PCO2 BLDV: 50 MMHG — SIGNIFICANT CHANGE UP (ref 35–50)
PCO2 BLDV: 51 MMHG — HIGH (ref 35–50)
PH BLDV: 7.36 — SIGNIFICANT CHANGE UP (ref 7.35–7.45)
PH BLDV: 7.38 — SIGNIFICANT CHANGE UP (ref 7.35–7.45)
PLATELET # BLD AUTO: 247 K/UL — SIGNIFICANT CHANGE UP (ref 150–400)
PO2 BLDV: 25 MMHG — SIGNIFICANT CHANGE UP (ref 25–45)
PO2 BLDV: 30 MMHG — SIGNIFICANT CHANGE UP (ref 25–45)
POTASSIUM BLDV-SCNC: 7.8 MMOL/L — CRITICAL HIGH (ref 3.5–5.3)
POTASSIUM BLDV-SCNC: 9 MMOL/L — CRITICAL HIGH (ref 3.5–5.3)
POTASSIUM SERPL-MCNC: 5.5 MMOL/L — HIGH (ref 3.5–5.3)
POTASSIUM SERPL-MCNC: 8.3 MMOL/L — CRITICAL HIGH (ref 3.5–5.3)
POTASSIUM SERPL-MCNC: >9 MMOL/L — CRITICAL HIGH (ref 3.5–5.3)
POTASSIUM SERPL-SCNC: 5.5 MMOL/L — HIGH (ref 3.5–5.3)
POTASSIUM SERPL-SCNC: 8.3 MMOL/L — CRITICAL HIGH (ref 3.5–5.3)
POTASSIUM SERPL-SCNC: >9 MMOL/L — CRITICAL HIGH (ref 3.5–5.3)
PROT SERPL-MCNC: 7.9 G/DL — SIGNIFICANT CHANGE UP (ref 6–8.3)
RBC # BLD: 3.15 M/UL — LOW (ref 3.8–5.2)
RBC # FLD: 14.2 % — SIGNIFICANT CHANGE UP (ref 10.3–14.5)
SAO2 % BLDV: 32 % — LOW (ref 67–88)
SAO2 % BLDV: 45 % — LOW (ref 67–88)
SODIUM SERPL-SCNC: 132 MMOL/L — LOW (ref 135–145)
SODIUM SERPL-SCNC: 133 MMOL/L — LOW (ref 135–145)
WBC # BLD: 5.8 K/UL — SIGNIFICANT CHANGE UP (ref 3.8–10.5)
WBC # FLD AUTO: 5.8 K/UL — SIGNIFICANT CHANGE UP (ref 3.8–10.5)

## 2019-05-16 PROCEDURE — 99284 EMERGENCY DEPT VISIT MOD MDM: CPT | Mod: 25

## 2019-05-16 PROCEDURE — 99223 1ST HOSP IP/OBS HIGH 75: CPT

## 2019-05-16 PROCEDURE — 93010 ELECTROCARDIOGRAM REPORT: CPT

## 2019-05-16 PROCEDURE — 93970 EXTREMITY STUDY: CPT | Mod: 26

## 2019-05-16 PROCEDURE — 71045 X-RAY EXAM CHEST 1 VIEW: CPT | Mod: 26

## 2019-05-16 RX ORDER — INSULIN LISPRO 100/ML
3 VIAL (ML) SUBCUTANEOUS
Refills: 0 | Status: DISCONTINUED | OUTPATIENT
Start: 2019-05-16 | End: 2019-05-19

## 2019-05-16 RX ORDER — METOPROLOL TARTRATE 50 MG
50 TABLET ORAL DAILY
Refills: 0 | Status: DISCONTINUED | OUTPATIENT
Start: 2019-05-16 | End: 2019-05-21

## 2019-05-16 RX ORDER — CLOPIDOGREL BISULFATE 75 MG/1
75 TABLET, FILM COATED ORAL DAILY
Refills: 0 | Status: DISCONTINUED | OUTPATIENT
Start: 2019-05-16 | End: 2019-05-21

## 2019-05-16 RX ORDER — DULOXETINE HYDROCHLORIDE 30 MG/1
60 CAPSULE, DELAYED RELEASE ORAL DAILY
Refills: 0 | Status: DISCONTINUED | OUTPATIENT
Start: 2019-05-16 | End: 2019-05-21

## 2019-05-16 RX ORDER — OXYCODONE AND ACETAMINOPHEN 5; 325 MG/1; MG/1
1 TABLET ORAL EVERY 6 HOURS
Refills: 0 | Status: DISCONTINUED | OUTPATIENT
Start: 2019-05-16 | End: 2019-05-21

## 2019-05-16 RX ORDER — ASPIRIN/CALCIUM CARB/MAGNESIUM 324 MG
81 TABLET ORAL DAILY
Refills: 0 | Status: DISCONTINUED | OUTPATIENT
Start: 2019-05-16 | End: 2019-05-21

## 2019-05-16 RX ORDER — FENTANYL CITRATE 50 UG/ML
25 INJECTION INTRAVENOUS ONCE
Refills: 0 | Status: DISCONTINUED | OUTPATIENT
Start: 2019-05-16 | End: 2019-05-16

## 2019-05-16 RX ORDER — ACETAMINOPHEN 500 MG
650 TABLET ORAL EVERY 6 HOURS
Refills: 0 | Status: DISCONTINUED | OUTPATIENT
Start: 2019-05-16 | End: 2019-05-21

## 2019-05-16 RX ORDER — DEXTROSE 50 % IN WATER 50 %
25 SYRINGE (ML) INTRAVENOUS ONCE
Refills: 0 | Status: DISCONTINUED | OUTPATIENT
Start: 2019-05-16 | End: 2019-05-21

## 2019-05-16 RX ORDER — SEVELAMER CARBONATE 2400 MG/1
1600 POWDER, FOR SUSPENSION ORAL
Refills: 0 | Status: DISCONTINUED | OUTPATIENT
Start: 2019-05-16 | End: 2019-05-21

## 2019-05-16 RX ORDER — ACETAMINOPHEN 500 MG
1000 TABLET ORAL ONCE
Refills: 0 | Status: DISCONTINUED | OUTPATIENT
Start: 2019-05-16 | End: 2019-05-16

## 2019-05-16 RX ORDER — INSULIN LISPRO 100/ML
2 VIAL (ML) SUBCUTANEOUS AT BEDTIME
Refills: 0 | Status: DISCONTINUED | OUTPATIENT
Start: 2019-05-16 | End: 2019-05-21

## 2019-05-16 RX ORDER — HEPARIN SODIUM 5000 [USP'U]/ML
5000 INJECTION INTRAVENOUS; SUBCUTANEOUS EVERY 12 HOURS
Refills: 0 | Status: DISCONTINUED | OUTPATIENT
Start: 2019-05-16 | End: 2019-05-21

## 2019-05-16 RX ORDER — SODIUM CHLORIDE 9 MG/ML
3 INJECTION INTRAMUSCULAR; INTRAVENOUS; SUBCUTANEOUS ONCE
Refills: 0 | Status: COMPLETED | OUTPATIENT
Start: 2019-05-16 | End: 2019-05-16

## 2019-05-16 RX ORDER — ACETAMINOPHEN 500 MG
975 TABLET ORAL ONCE
Refills: 0 | Status: COMPLETED | OUTPATIENT
Start: 2019-05-16 | End: 2019-05-16

## 2019-05-16 RX ORDER — FUROSEMIDE 40 MG
20 TABLET ORAL
Refills: 0 | Status: DISCONTINUED | OUTPATIENT
Start: 2019-05-16 | End: 2019-05-21

## 2019-05-16 RX ORDER — INSULIN HUMAN 100 [IU]/ML
6 INJECTION, SOLUTION SUBCUTANEOUS ONCE
Refills: 0 | Status: COMPLETED | OUTPATIENT
Start: 2019-05-16 | End: 2019-05-16

## 2019-05-16 RX ORDER — DEXTROSE 50 % IN WATER 50 %
15 SYRINGE (ML) INTRAVENOUS ONCE
Refills: 0 | Status: DISCONTINUED | OUTPATIENT
Start: 2019-05-16 | End: 2019-05-21

## 2019-05-16 RX ORDER — INSULIN LISPRO 100/ML
VIAL (ML) SUBCUTANEOUS
Refills: 0 | Status: DISCONTINUED | OUTPATIENT
Start: 2019-05-16 | End: 2019-05-21

## 2019-05-16 RX ORDER — DEXTROSE 50 % IN WATER 50 %
12.5 SYRINGE (ML) INTRAVENOUS ONCE
Refills: 0 | Status: DISCONTINUED | OUTPATIENT
Start: 2019-05-16 | End: 2019-05-21

## 2019-05-16 RX ORDER — GLUCAGON INJECTION, SOLUTION 0.5 MG/.1ML
1 INJECTION, SOLUTION SUBCUTANEOUS ONCE
Refills: 0 | Status: DISCONTINUED | OUTPATIENT
Start: 2019-05-16 | End: 2019-05-21

## 2019-05-16 RX ORDER — INSULIN GLARGINE 100 [IU]/ML
6 INJECTION, SOLUTION SUBCUTANEOUS AT BEDTIME
Refills: 0 | Status: DISCONTINUED | OUTPATIENT
Start: 2019-05-16 | End: 2019-05-20

## 2019-05-16 RX ORDER — LISINOPRIL 2.5 MG/1
40 TABLET ORAL DAILY
Refills: 0 | Status: DISCONTINUED | OUTPATIENT
Start: 2019-05-16 | End: 2019-05-21

## 2019-05-16 RX ORDER — ATORVASTATIN CALCIUM 80 MG/1
40 TABLET, FILM COATED ORAL AT BEDTIME
Refills: 0 | Status: DISCONTINUED | OUTPATIENT
Start: 2019-05-16 | End: 2019-05-21

## 2019-05-16 RX ORDER — INSULIN LISPRO 100/ML
VIAL (ML) SUBCUTANEOUS AT BEDTIME
Refills: 0 | Status: DISCONTINUED | OUTPATIENT
Start: 2019-05-16 | End: 2019-05-21

## 2019-05-16 RX ORDER — HYDRALAZINE HCL 50 MG
25 TABLET ORAL THREE TIMES A DAY
Refills: 0 | Status: DISCONTINUED | OUTPATIENT
Start: 2019-05-16 | End: 2019-05-21

## 2019-05-16 RX ORDER — SODIUM CHLORIDE 9 MG/ML
1000 INJECTION, SOLUTION INTRAVENOUS
Refills: 0 | Status: DISCONTINUED | OUTPATIENT
Start: 2019-05-16 | End: 2019-05-21

## 2019-05-16 RX ADMIN — Medication 81 MILLIGRAM(S): at 18:17

## 2019-05-16 RX ADMIN — Medication 1 TABLET(S): at 18:16

## 2019-05-16 RX ADMIN — OXYCODONE AND ACETAMINOPHEN 1 TABLET(S): 5; 325 TABLET ORAL at 22:16

## 2019-05-16 RX ADMIN — OXYCODONE AND ACETAMINOPHEN 1 TABLET(S): 5; 325 TABLET ORAL at 23:42

## 2019-05-16 RX ADMIN — Medication 2: at 22:16

## 2019-05-16 RX ADMIN — DULOXETINE HYDROCHLORIDE 60 MILLIGRAM(S): 30 CAPSULE, DELAYED RELEASE ORAL at 18:56

## 2019-05-16 RX ADMIN — INSULIN HUMAN 6 UNIT(S): 100 INJECTION, SOLUTION SUBCUTANEOUS at 11:30

## 2019-05-16 RX ADMIN — Medication 50 MILLIGRAM(S): at 18:16

## 2019-05-16 RX ADMIN — SEVELAMER CARBONATE 1600 MILLIGRAM(S): 2400 POWDER, FOR SUSPENSION ORAL at 18:56

## 2019-05-16 RX ADMIN — Medication 975 MILLIGRAM(S): at 10:58

## 2019-05-16 RX ADMIN — Medication 3 UNIT(S): at 18:17

## 2019-05-16 RX ADMIN — Medication 25 MILLIGRAM(S): at 22:08

## 2019-05-16 RX ADMIN — INSULIN GLARGINE 6 UNIT(S): 100 INJECTION, SOLUTION SUBCUTANEOUS at 22:16

## 2019-05-16 RX ADMIN — SODIUM CHLORIDE 3 MILLILITER(S): 9 INJECTION INTRAMUSCULAR; INTRAVENOUS; SUBCUTANEOUS at 10:03

## 2019-05-16 RX ADMIN — ATORVASTATIN CALCIUM 40 MILLIGRAM(S): 80 TABLET, FILM COATED ORAL at 22:08

## 2019-05-16 RX ADMIN — CLOPIDOGREL BISULFATE 75 MILLIGRAM(S): 75 TABLET, FILM COATED ORAL at 18:16

## 2019-05-16 RX ADMIN — Medication 25 MILLIGRAM(S): at 18:16

## 2019-05-16 NOTE — H&P ADULT - ASSESSMENT
63 f with    ESRD  - HD  - Nephrology evaluation Dr. Schmidt    DKA/ Diabetes type 1 control  - endocrine evaluation called    CAD  - stable  - cardiology evaluation Dr. Biswas    CHF (congestive heart failure) EF 40-45%  - cardiology evaluation    COPD (chronic obstructive pulmonary disease)   - nebs    CVA (cerebral infarction)  - supportive care     Depression  - continue Rx    Gout  - continue Rx    Hyperlipidemia   - continue Rx    Peripheral vascular disease occluded left fem-pop bypass 5/2015  - stable    Further action as per clinical course   Pierce Viera MD pager [phone]

## 2019-05-16 NOTE — H&P ADULT - HISTORY OF PRESENT ILLNESS
64 y/o Female PMH CAD Sp PTCA w stents CHF,COPD, CVA, DMT1, ESRD on HD (M,Tu,Th,Sa), Gout, HLD, PVD, Sublcavian Stensosis (L) sp stent. PSH ASD Repair,idania,fem-pop bypass. Last dc from hospt approx 2wk ago now comes to ED via ems complains of lower extr swelling maribell with pain. EMS report pt in mod pain necessitating fentanyl and that pt seemed very sleepy. Pt comes to ed complains of maribell lower extr pain onset yesterday "real bad". Pt has reported le pain in past without clear dx according to pt. No hx trauma. No fever chiil shortness of breath cheat pain. Pain improves with ambulation.

## 2019-05-16 NOTE — CONSULT NOTE ADULT - ASSESSMENT
62 y/o Female PMH CAD Sp PTCA w stents CHF,COPD, CVA, DMT1, ESRD on HD (M,Tu,Th,Sa), Gout, HLD, PVD, Sublcavian Stensosis (L) sp stent. PSH ASD Repair,idania,fem-pop bypass. Last dc from hospt approx 2wk ago now comes to ED via ems complains of lower extr swelling maribell with pain in ER k > 9 repeated K 8.3.  and pt with severe hyperglycemia as well as high AGAP on presentation     1- hyperkalemia  2- chf  3- hyperglycemia  4-  htn       hd consent obtained witnessed placed in chart  urgent hd arrangements made   endo consult   start long acting insulin in this pt with brittle DM   avf dopplers to evaluate for stenosis however, likelihood of dietary indiscretion likely   d/w ED attending   d/w icu team

## 2019-05-16 NOTE — H&P ADULT - NSHPSOCIALHISTORY_GEN_ALL_CORE
Social History:    Marital Status:  (  x )    (   ) Single    (   )    (  )   Occupation:   Lives with: (  ) alone  (  ) children   (x  ) spouse   (  ) parents  (  ) other    Substance Use (street drugs): (  x) never used  (  ) other:  Tobacco Usage:  (  x ) never smoked   (   ) former smoker   (   ) current smoker  (     ) pack years  (        ) last cigarette date  Alcohol Usage: denies    (     ) Advanced Directives: (     ) None    (      ) DNR    (     ) DNI    (     ) Health Care Proxy:

## 2019-05-16 NOTE — CONSULT NOTE ADULT - ASSESSMENT
62yo F h/o DM1 w frequent hospitalizations for DKA, ESRD on HD (LUE AVF, M/T/Th/Sa, last HD currently), CAD s/p  stents, HTN HLD, CVA, PVD presents from home w/ LE pain and swelling with hyperglycemia with glucose > 400 (high risk patient with high level decision-making).

## 2019-05-16 NOTE — CONSULT NOTE ADULT - PROBLEM SELECTOR RECOMMENDATION 9
Diabetes Education and Nutrition Eval  Start Lantus 6 units qhs  Start Humalog 3 units qac + 2 units for bedtime snack. Can give with the meal or up to 5 min after to ensure patient is going to be eating.   Low correction scale qac + bedtime  Goal glucose 100-180  Outpt. endo follow-up w/ Dr. Ley  Outpt. optho, podiatry, nephrology  Plan to d/c on basal bolus.

## 2019-05-16 NOTE — ED ADULT NURSE NOTE - OBJECTIVE STATEMENT
63 y.o F arrived to ED via EMS from home c/o B/L lower extremity swelling and pain. A&Ox3. pmh CAD s/p 8 stents, CHF, COPD, CVA, DMT1, ESRD on HD through fistula LUE monday/tuesday/thursday/Saturday), Gout, HLD, PVD. per  at bedside states pt was most recently in hospital about 1 week ago. states over night had severe pain to lower extremities effecting ambulation. upon assessment swelling noted B/L lower extremities worse to LLE. tender to touch. insulin pump attachment noted to abdomen, per  pt has not been utilizing insulin pump for some time but uses insulin injections at home. EMS administered 25mcg of fentanyl en route to ED. no injury or trauma. respirations nonlabored pt appears tired but states she did not get much sleep over night r/t pain. O2 sat maintaining >96% RA. abdomen soft, endorses intermittent abdominal discomfort. neuro intact able to move all extremities. Denies CP, SOB, fevers, chills, urinary symptoms. Safety and comfort provided/maintained. Spouse at bedside

## 2019-05-16 NOTE — ED PROVIDER NOTE - CLINICAL SUMMARY MEDICAL DECISION MAKING FREE TEXT BOX
RAIMUNDO pain LE concerns for dvt, pad, check dopplers, labs control hypergylcemia probable admti for HD  Rudy Bartholomew MD, Facep

## 2019-05-16 NOTE — CONSULT NOTE ADULT - ASSESSMENT
63 year old female with a history of Type 1 DM, ESRD on HD M/Tu/Th/Sa, CAD s/p stents, CHF, COPD and CVA presenting with b/l leg pain found to have hyperkalemia and hyperglycemia.    #Hyperkalemia  - Likely in the setting of patient not having attended all of her scheduled HD sessions  - Discussed with patient's nephrologist Dr. Schmidt, will bring patient to dialysis unit immediately  - Repeat BMP after HD session  - No peaked T waves seen on EKG  - Given insulin 6 units in ED 63 year old female with a history of Type 1 DM, ESRD on HD M/Tu/Th/Sa, CAD s/p stents, CHF, COPD and CVA presenting with b/l leg pain found to have hyperkalemia and hyperglycemia.    #Hyperkalemia  - Likely in the setting of patient not having attended all of her scheduled HD sessions  - Discussed with patient's nephrologist Dr. Schmidt, will bring patient to dialysis unit immediately  - Repeat BMP after HD session  - No peaked T waves seen on EKG  - Given insulin 6 units in ED  - Monitor on telemetry floor    #Hyperglycemia  - Does not appear to be DKA as patient has pH of 7.41, mild AG of 21, BHB minimally elevated to 1.0  - Would restart home insulin regimen, consult endocrine if unclear what patient takes at home since she is no longer on an insulin pump  - Monitor FS q4h, cover with moderate dose ISS

## 2019-05-16 NOTE — ED PROVIDER NOTE - OBJECTIVE STATEMENT
Private Physician Last admitted to Dr Beavers  64 y/o Female PMH CAD Sp PTCA w stents CHF,COPD, CVA, DMT1, ESRD on HD (M,Tu,Th,Sa), Gout, HLD, PVD, Sublcavian Stensosis (L) sp stent. PSH ASD Repair,idania,fem-pop bypass. Last dc from hospt approx 2wk ago now comes to ED via ems complains of lower extr swelling maribell with pain. Private Physician Last admitted to Dr Beavers  64 y/o Female PMH CAD Sp PTCA w stents CHF,COPD, CVA, DMT1, ESRD on HD (M,Tu,Th,Sa), Gout, HLD, PVD, Sublcavian Stensosis (L) sp stent. PSH ASD Repair,idania,fem-pop bypass. Last dc from hospt approx 2wk ago now comes to ED via ems complains of lower extr swelling maribell with pain. EMS report pt in mod pain necessitating fentanyl and that pt seemed very sleepy. Pt comes to ed complains of maribell lower extr pain onset yesterday "real bad". Pt has reported le pain in past without clear dx according to pt. No hx trauma. No fever chiil shortness of breath cheat pain. Pain improves with ambulation.

## 2019-05-16 NOTE — H&P ADULT - NSHPREVIEWOFSYSTEMS_GEN_ALL_CORE
REVIEW OF SYSTEMS:    CONSTITUTIONAL: + weakness, no fevers or chills  EYES/ENT: No visual changes;  No vertigo or throat pain   NECK: No pain or stiffness  RESPIRATORY: No cough, wheezing, hemoptysis; + shortness of breath  CARDIOVASCULAR: No chest pain or palpitations  GASTROINTESTINAL: No abdominal or epigastric pain. No nausea, vomiting, or hematemesis; No diarrhea or constipation. No melena or hematochezia.  GENITOURINARY: No dysuria, frequency or hematuria  NEUROLOGICAL: No numbness or weakness  SKIN: No itching, burning, rashes, or lesions   + legs pain  All other review of systems is negative unless indicated above.

## 2019-05-16 NOTE — CONSULT NOTE ADULT - SUBJECTIVE AND OBJECTIVE BOX
[facility]  [phone]  NEPHROLOGY      INITIAL CONSULT NOTE  --------------------------------------------------------------------------------  HPI:      64 y/o Female PMH CAD Sp PTCA w stents CHF,COPD, CVA, DMT1, ESRD on HD (M,Tu,Th,Sa), Gout, HLD, PVD, Sublcavian Stensosis (L) sp stent. PSH ASD Repair,idania,fem-pop bypass. Last dc from hospt approx 2wk ago now comes to ED via ems complains of lower extr swelling maribell with pain. EMS report pt in mod pain necessitating fentanyl and that pt seemed very sleepy. Pt comes to ed complains of maribell lower extr pain onset yesterday "real bad". Pt has reported le pain in past without clear dx according to pt. No hx trauma. No fever chiil shortness of breath cheat pain. in ER k > 9 repeated K 8.3 renal consult called. last hd 2 days ago  and pt with severe hyperglycemia as well as high AGAP    PAST HISTORY  --------------------------------------------------------------------------------  PAST MEDICAL & SURGICAL HISTORY:  COPD (chronic obstructive pulmonary disease)  Localized enlarged lymph nodes  CHF (congestive heart failure): EF 40-45%  Subclavian vein stenosis, left: s/p stent  DKA, type 1: 1/2015  ACS (acute coronary syndrome): 1/2015 - cath revealed 100% ostial stenosis not amenable to PCI - medical management  TIA (transient ischemic attack): x 2 - 8-9 years ago prior to ASD/VSD repair  CAD (coronary artery disease): s/p stents  Gout: past  CVA (cerebral infarction): with no residual, 8 yrs ago, prior to heart surgery - ST memory loss  Peripheral vascular disease: occluded left fem-pop bypass 5/2015  Diabetes mellitus type 1: Insulin Dependent -  ESRD (end stage renal disease): dialysis  M, tue, thursday, saturday  Hyperlipidemia  Status post device closure of ASD: &quot;bren[place]&quot;  History of cardiac catheterization: 1/2015 - no intervention  S/P femoral-popliteal bypass surgery: L and R in 2013 with graft; 5/2015 CFA angioplasty left and ileofemoral endarterectomywith vein patch angioplasty of left fem-pop bypass graft  Multiple vascular surgery both leg, left fempop bypass revision 11/2015  AV (arteriovenous fistula): Left AV graft; revision with stent placement 2-3 years ago  S/P cholecystectomy    FAMILY HISTORY:  Family history of smoking  Family history of hypertension  Family history of cancer (Sibling)    PAST SOCIAL HISTORY: past tobacco use     ALLERGIES & MEDICATIONS  --------------------------------------------------------------------------------  Allergies    No Known Allergies    Intolerances      Standing Inpatient Medications  aspirin enteric coated 81 milliGRAM(s) Oral daily  atorvastatin 40 milliGRAM(s) Oral at bedtime  clopidogrel Tablet 75 milliGRAM(s) Oral daily  dextrose 5%. 1000 milliLiter(s) IV Continuous <Continuous>  dextrose 50% Injectable 12.5 Gram(s) IV Push once  dextrose 50% Injectable 25 Gram(s) IV Push once  dextrose 50% Injectable 25 Gram(s) IV Push once  DULoxetine 60 milliGRAM(s) Oral daily  furosemide    Tablet 20 milliGRAM(s) Oral <User Schedule>  heparin  Injectable 5000 Unit(s) SubCutaneous every 12 hours  hydrALAZINE 25 milliGRAM(s) Oral three times a day  insulin glargine Injectable (LANTUS) 6 Unit(s) SubCutaneous at bedtime  insulin lispro (HumaLOG) corrective regimen sliding scale   SubCutaneous three times a day before meals  insulin lispro (HumaLOG) corrective regimen sliding scale   SubCutaneous at bedtime  insulin lispro Injectable (HumaLOG) 3 Unit(s) SubCutaneous before breakfast  insulin lispro Injectable (HumaLOG) 3 Unit(s) SubCutaneous before lunch  insulin lispro Injectable (HumaLOG) 3 Unit(s) SubCutaneous before dinner  lisinopril 40 milliGRAM(s) Oral daily  metoprolol succinate ER 50 milliGRAM(s) Oral daily  multivitamin 1 Tablet(s) Oral daily  sevelamer carbonate 1600 milliGRAM(s) Oral three times a day with meals    PRN Inpatient Medications  acetaminophen   Tablet .. 650 milliGRAM(s) Oral every 6 hours PRN  dextrose 40% Gel 15 Gram(s) Oral once PRN  glucagon  Injectable 1 milliGRAM(s) IntraMuscular once PRN  insulin lispro Injectable (HumaLOG) 2 Unit(s) SubCutaneous at bedtime PRN  oxyCODONE    5 mG/acetaminophen 325 mG 1 Tablet(s) Oral every 6 hours PRN      REVIEW OF SYSTEMS  --------------------------------------------------------------------------------  Gen: No  fevers/chills   Skin: No rashes  Head/Eyes/Ears/Mouth: No headache; Normal hearing;  + decrease vision. No sinus pain/discomfort, sore throat  Respiratory: + dyspnea, denies  cough, wheezing, hemoptysis  CV: No chest pain, or palp   GI: No abdominal pain, diarrhea, nausea, vomiting, melena  : No dysuria,  MSK: No joint pain/ + leg pain and swelling; no back pain  Neuro: No dizziness/lightheadedness,  also with left leg  edema     All other systems were reviewed and are negative, except as noted.    VITALS/PHYSICAL EXAM  --------------------------------------------------------------------------------  T(C): 36.7 (05-16-19 @ 17:19), Max: 36.8 (05-16-19 @ 09:30)  HR: 88 (05-16-19 @ 17:19) (70 - 88)  BP: 165/85 (05-16-19 @ 17:19) (120/64 - 165/85)  RR: 18 (05-16-19 @ 17:19) (17 - 19)  SpO2: 98% (05-16-19 @ 17:19) (98% - 100%)  Wt(kg): --  Height (cm): 160.02 (05-16-19 @ 17:19)  Weight (kg): 54.4 (05-16-19 @ 17:19)  BMI (kg/m2): 21.2 (05-16-19 @ 17:19)  BSA (m2): 1.56 (05-16-19 @ 17:19)      Physical Exam:  	Gen: Non toxic comfortable appearing   	no jvd , supple neck,   	Pulm: decrease bs  no rales or ronchi or wheezing  	CV: RRR, S1S2; no rub  	Back: No CVA tenderness; no sacral edema  	Abd: +BS, soft, nontender/nondistended  	: No suprapubic tenderness  	UE: Warm, no cyanosis  no clubbing,  no edema; no asterixis  	LE: Warm, no cyanosis  no clubbing, no edema  	Neuro: alert and oriented. speech coherent   	Psych: Normal affect and mood  	Skin: Warm, no decrease skin turgor   	Vascular access:    LABS/STUDIES  --------------------------------------------------------------------------------              10.7   5.8   >-----------<  247      [05-16-19 @ 09:53]              32.8     133  |  89  |  61  ----------------------------<  517      [05-16-19 @ 11:25]  8.3   |  22  |  5.76        Ca     10.1     [05-16-19 @ 11:25]    TPro  7.9  /  Alb  4.5  /  TBili  0.5  /  DBili  x   /  AST  144  /  ALT  72  /  AlkPhos  100  [05-16-19 @ 09:53]          Creatinine Trend:  SCr 5.76 [05-16 @ 11:25]  SCr 5.51 [05-16 @ 09:53]  SCr 2.99 [05-11 @ 18:53]  SCr 4.09 [04-30 @ 07:12]  SCr 5.93 [04-29 @ 17:04]    Urinalysis - [06-04-17 @ 08:24]      Color Yellow / Appearance Clear / SG 1.013 / pH 8.5      Gluc 1000 / Ketone Negative  / Bili Negative / Urobili Negative       Blood Negative / Protein >600 / Leuk Est Small / Nitrite Negative      RBC 3-5 / WBC 6-10 / Hyaline  / Gran  / Sq Epi  / Non Sq Epi OCC / Bacteria       PTH -- (Ca 7.8)      [03-29-19 @ 20:38]   73  PTH -- (Ca 7.3)      [02-22-19 @ 02:49]   59  HbA1c 8.8      [04-30-19 @ 08:58]  TSH 0.71      [11-17-18 @ 08:25]    HBsAb <3.0      [04-19-19 @ 07:50]  HBsAb Nonreact      [03-26-19 @ 16:04]  HBsAg Nonreact      [04-19-19 @ 07:50]  HBcAb Nonreact      [03-26-19 @ 16:04]  HCV 0.11, Nonreact      [04-19-19 @ 07:50]      < from: Xray Chest 1 View AP/PA (05.16.19 @ 10:16) >  EXAM:  XR CHEST AP OR PA 1V                            PROCEDURE DATE:  05/16/2019            INTERPRETATION:  CLINICAL INFORMATION: Shortness of breath.    A single portable view of the chest was obtained.     Comparison: 5/11/2019.     The mediastinal cardiac silhouette is unremarkable. Aortic   calcifications. Left subclavian vascular stent.    The lungs are clear.     No acute osseous finding.     IMPRESSION:    No acute process.                    RY YU M.D., ATTENDING RADIOLOGIST  This document has been electronically signed. May 16 2019 12:11PM    < end of copied text >

## 2019-05-16 NOTE — CONSULT NOTE ADULT - SUBJECTIVE AND OBJECTIVE BOX
HPI:  63 y.o. female with h/o uncontrolled Type 1 DM (diagnosed at age 20) c/b neuropathy, retinopathy, ESRD on HD with CAD s/p PCI, TIA, CHF and lung lesion admitted for hyperglycemia and LE edema. Patient had recent cardiac cath in Feb 2019 with no intervention. Patient follows with endocrinologist Dr Ley. Patient takes Lantus 3-5 U at night and Humalog 3U before meals. States glucose values are extremely variable with hyper and hypoglycemia without a pattern. [facility]. Is UTD with opthalmology and goes every 3 months. On HD 4 days per week. No podiatry. States she tries to moderate her intake of carbs. Patient used to be on an insulin pump in the past but was not able to manage it appropriately.   Also hx of CAD Sp PTCA w stents CHF, COPD, CVA, Gout, HLD, PVD, Sublcavian Stensosis (L) sp stent. PSH ASD Repair,idania,fem-pop bypass. Last dc from hospt approx 2wk ago now comes to ED via ems complains of lower extr swelling maribell with pain. EMS report pt in mod pain necessitating fentanyl and that pt seemed very sleepy. Pt comes to ed complains of maribell lower extr pain onset yesterday "real bad". Pt has reported le pain in past without clear dx according to pt. No hx trauma. Pain improves with ambulation.       PAST MEDICAL & SURGICAL HISTORY:  COPD (chronic obstructive pulmonary disease)  Localized enlarged lymph nodes  CHF (congestive heart failure): EF 40-45%  Subclavian vein stenosis, left: s/p stent  DKA, type 1: 1/2015  ACS (acute coronary syndrome): 1/2015 - cath revealed 100% ostial stenosis not amenable to PCI - medical management  TIA (transient ischemic attack): x 2 - 8-9 years ago prior to ASD/VSD repair  CAD (coronary artery disease): s/p stents  Gout: past  CVA (cerebral infarction): with no residual, 8 yrs ago, prior to heart surgery - ST memory loss  Peripheral vascular disease: occluded left fem-pop bypass 5/2015  Diabetes mellitus type 1: Insulin Dependent -  ESRD (end stage renal disease): dialysis  M, tue, thursday, saturday  Hyperlipidemia  Status post device closure of ASD: &quot;clamshell&quot;  History of cardiac catheterization: 1/2015 - no intervention  S/P femoral-popliteal bypass surgery: L and R in 2013 with graft; 5/2015 CFA angioplasty left and ileofemoral endarterectomywith vein patch angioplasty of left fem-pop bypass graft  Multiple vascular surgery both leg, left fempop bypass revision 11/2015  AV (arteriovenous fistula): Left AV graft; revision with stent placement 2-3 years ago  S/P cholecystectomy      FAMILY HISTORY:  Family history of smoking  Family history of hypertension  Family history of cancer (Sibling)      Social History: Former smoker, quit smoking 16yrs ago. No alcohol use.     Outpatient Medications:  · 	metoprolol succinate 50 mg oral tablet, extended release: 1 tab(s) orally once a day  · 	clopidogrel 75 mg oral tablet: 1 tab(s) orally once a day  · 	aspirin 81 mg oral delayed release tablet: 1 tab(s) orally once a day  · 	atorvastatin 20 mg oral tablet: 2 tab(s) orally once a day (at bedtime)  · 	DULoxetine 60 mg oral delayed release capsule: 1 cap(s) orally once a day  · 	Renvela 800 mg oral tablet: 2  orally 3 times a day (with meals)  · 	furosemide 40 mg oral tablet: 1 tab(s) orally 3 times a week. Monday-Friday and Saturday  · 	hydrALAZINE 25 mg oral tablet: 1 tab(s) orally 3 times a day  · 	Percocet 5/325 oral tablet: 2 tab(s) orally once a day (at bedtime), As Needed  · 	lisinopril 40 mg oral tablet: 1 tab(s) orally once a day  · 	Multiple Vitamins oral tablet: 1 tab(s) orally once a day  	[place] MVI  · 	HumaLOG: 3  subcutaneous 3 times a day (before meals)    MEDICATIONS  (STANDING):  aspirin enteric coated 81 milliGRAM(s) Oral daily  atorvastatin 40 milliGRAM(s) Oral at bedtime  clopidogrel Tablet 75 milliGRAM(s) Oral daily  dextrose 5%. 1000 milliLiter(s) (50 mL/Hr) IV Continuous <Continuous>  dextrose 50% Injectable 12.5 Gram(s) IV Push once  dextrose 50% Injectable 25 Gram(s) IV Push once  dextrose 50% Injectable 25 Gram(s) IV Push once  DULoxetine 60 milliGRAM(s) Oral daily  furosemide    Tablet 20 milliGRAM(s) Oral <User Schedule>  heparin  Injectable 5000 Unit(s) SubCutaneous every 12 hours  hydrALAZINE 25 milliGRAM(s) Oral three times a day  insulin lispro (HumaLOG) corrective regimen sliding scale   SubCutaneous three times a day before meals  insulin lispro (HumaLOG) corrective regimen sliding scale   SubCutaneous at bedtime  insulin lispro Injectable (HumaLOG) 3 Unit(s) SubCutaneous before breakfast  insulin lispro Injectable (HumaLOG) 3 Unit(s) SubCutaneous before lunch  insulin lispro Injectable (HumaLOG) 3 Unit(s) SubCutaneous before dinner  lisinopril 40 milliGRAM(s) Oral daily  metoprolol succinate ER 50 milliGRAM(s) Oral daily  multivitamin 1 Tablet(s) Oral daily  sevelamer carbonate 1600 milliGRAM(s) Oral three times a day with meals    MEDICATIONS  (PRN):  acetaminophen   Tablet .. 650 milliGRAM(s) Oral every 6 hours PRN Temp greater or equal to 38.5C (101.3F), Mild Pain (1 - 3)  dextrose 40% Gel 15 Gram(s) Oral once PRN Blood Glucose LESS THAN 70 milliGRAM(s)/deciliter  glucagon  Injectable 1 milliGRAM(s) IntraMuscular once PRN Glucose LESS THAN 70 milligrams/deciliter  oxyCODONE    5 mG/acetaminophen 325 mG 1 Tablet(s) Oral every 6 hours PRN Moderate Pain (4 - 6)      Allergies    No Known Allergies    Intolerances      Review of Systems:  Constitutional: No fever, No change in weight, + fatigue  Eyes: +retinopathy  Neuro: No headache, +neuropathy  HEENT: No throat pain  Cardiovascular: No chest pain  Respiratory: + SOB  GI: No nausea or vomiting  : No polyuria  Skin: no rash  Psych: no depression  Endocrine: No polydipsia, +cold intolerance, rest as noted in HPI  Hem/lymph: + LE swelling    All other review of systems negative      PHYSICAL EXAM:  VITALS: T(C): 36.8 (05-16-19 @ 09:30)  T(F): 98.3 (05-16-19 @ 09:30), Max: 98.3 (05-16-19 @ 09:30)  HR: 70 (05-16-19 @ 11:30) (70 - 78)  BP: 138/64 (05-16-19 @ 11:30) (120/64 - 150/72)  RR:  (17 - 19)  SpO2:  (98% - 100%)  Wt(kg): --  GENERAL: NAD at this time on HD at this time.   EYES: No proptosis, EOMI  HEENT:  Atraumatic, Normocephalic,   THYROID: Normal size, no palpable nodules  RESPIRATORY: full excursion, non-labored  CARDIOVASCULAR: Regular rhythm; No murmurs; + peripheral edema  GI: Soft, nontender, non distended, normal bowel sounds  SKIN: Dry, intact  MUSCULOSKELETAL: too tired to follow commands at this time.   NEURO:   PSYCH: flat affect  CUSHING'S SIGNS: no striae      POCT Blood Glucose.: 469 mg/dL (05-16-19 @ 11:27)                              10.7   5.8   )-----------( 247      ( 16 May 2019 09:53 )             32.8       05-16    133<L>  |  89<L>  |  61<H>  ----------------------------<  517<HH>  8.3<HH>   |  22  |  5.76<H>    EGFR if : 8<L>  EGFR if non : 7<L>    Ca    10.1      05-16    TPro  7.9  /  Alb  4.5  /  TBili  0.5  /  DBili  x   /  AST  144<H>  /  ALT  72<H>  /  AlkPhos  100  05-16    Thyroid Function Tests:      Hemoglobin A1C, Whole Blood: 8.8 % <H> [4.0 - 5.6] (04-30-19 @ 08:58)  Hemoglobin A1C, Whole Blood: 7.9 % <H> [4.0 - 5.6] (02-27-19 @ 11:08)        Radiology:

## 2019-05-16 NOTE — H&P ADULT - NSHPLABSRESULTS_GEN_ALL_CORE
10.7   5.8   )-----------( 247      ( 16 May 2019 09:53 )             32.8       05-16    133<L>  |  89<L>  |  61<H>  ----------------------------<  517<HH>  8.3<HH>   |  22  |  5.76<H>    Ca    10.1      16 May 2019 11:25    TPro  7.9  /  Alb  4.5  /  TBili  0.5  /  DBili  x   /  AST  144<H>  /  ALT  72<H>  /  AlkPhos  100  05-16                      Lactate Trend            CAPILLARY BLOOD GLUCOSE      POCT Blood Glucose.: 469 mg/dL (16 May 2019 11:27)        Culture Results:   No growth at 5 days. (04-29 @ 18:06)  Culture Results:   No growth at 5 days. (04-29 @ 18:06)  < from: Xray Chest 1 View AP/PA (05.16.19 @ 10:16) >    IMPRESSION:    No acute process.    < end of copied text >    LED no dvt

## 2019-05-16 NOTE — CONSULT NOTE ADULT - SUBJECTIVE AND OBJECTIVE BOX
CHIEF COMPLAINT: Leg pain    HPI: Patient is a 63 year old female with a history of Type 1 DM, ESRD on HD M/Tu/Th/Sa, CAD s/p stents, CHF, COPD and CVA presenting with b/l leg pain. Patient states that the pain has been ongoing for the past day and has been progressively worsening. Patient seen and examined in vascular lab with patient's nephrologist Dr. Schmidt, who states that patient has had previous episodes of leg pain in the past in the setting of hyperkalemia. Patient normally gets HD 4 times per week, however for the past few weeks she states that she has not been able to attend her HD sessions on Monday as she was told by her HD center that it was full and could not see her on those days. She also normally is on an insulin pump, however it was taken off recently as she had prior admissions despite being on an insulin pump. She has been using insulin at home but states that "it's not working."     PAST MEDICAL & SURGICAL HISTORY:  COPD (chronic obstructive pulmonary disease)  Localized enlarged lymph nodes  CHF (congestive heart failure): EF 40-45%  Subclavian vein stenosis, left: s/p stent  DKA, type 1: 1/2015  ACS (acute coronary syndrome): 1/2015 - cath revealed 100% ostial stenosis not amenable to PCI - medical management  TIA (transient ischemic attack): x 2 - 8-9 years ago prior to ASD/VSD repair  CAD (coronary artery disease): s/p stents  Gout: past  CVA (cerebral infarction): with no residual, 8 yrs ago, prior to heart surgery - ST memory loss  Peripheral vascular disease: occluded left fem-pop bypass 5/2015  Diabetes mellitus type 1: Insulin Dependent -  ESRD (end stage renal disease): dialysis  M, tue, thursday, saturday  Hyperlipidemia  Status post device closure of ASD: &quot;clamshell&quot;  History of cardiac catheterization: 1/2015 - no intervention  S/P femoral-popliteal bypass surgery: L and R in 2013 with graft; 5/2015 CFA angioplasty left and ileofemoral endarterectomywith vein patch angioplasty of left fem-pop bypass graft  Multiple vascular surgery both leg, left fempop bypass revision 11/2015  AV (arteriovenous fistula): Left AV graft; revision with stent placement 2-3 years ago  S/P cholecystectomy      FAMILY HISTORY:  Family history of smoking  Family history of hypertension  Family history of cancer (Sibling)      SOCIAL HISTORY:  Smoking: [ ] Never Smoked [ ] Former Smoker (__ packs x ___ years) [ ] Current Smoker  (__ packs x ___ years)  Substance Use: [ ] Never Used [ ] Used ____  EtOH Use:  Marital Status: [ ] Single [ ]  [ ]  [ ]   Sexual History:   Occupation:  Recent Travel:  Country of Birth:  Advance Directives:    Allergies    No Known Allergies    Intolerances        HOME MEDICATIONS: Reviewed    REVIEW OF SYSTEMS:  Constitutional: [ ] negative [ ] fevers [ ] chills [ ] weight loss [ ] weight gain  HEENT: [ ] negative [ ] dry eyes [ ] eye irritation [ ] postnasal drip [ ] nasal congestion  CV: [ ] negative  [ ] chest pain [ ] orthopnea [ ] palpitations [ ] murmur  Resp: [ ] negative [ ] cough [X] shortness of breath [ ] dyspnea [ ] wheezing [ ] sputum [ ] hemoptysis  GI: [ ] negative [X] nausea [ ] vomiting [ ] diarrhea [ ] constipation [ ] abd pain [ ] dysphagia   : [ ] negative [ ] dysuria [ ] nocturia [ ] hematuria [ ] increased urinary frequency  Musculoskeletal: [ ] negative [ ] back pain [X] myalgias [ ] arthralgias [ ] fracture  Skin: [ ] negative [ ] rash [ ] itch  Neurological: [ ] negative [ ] headache [ ] dizziness [ ] syncope [X] weakness [ ] numbness  Psychiatric: [ ] negative [ ] anxiety [ ] depression  Endocrine: [ ] negative [X] diabetes [ ] thyroid problem  Hematologic/Lymphatic: [ ] negative [ ] anemia [ ] bleeding problem  Allergic/Immunologic: [ ] negative [ ] itchy eyes [ ] nasal discharge [ ] hives [ ] angioedema  [ ] All other systems negative  [ ] Unable to assess ROS because ________    OBJECTIVE:  ICU Vital Signs Last 24 Hrs  T(C): 36.8 (16 May 2019 09:30), Max: 36.8 (16 May 2019 09:30)  T(F): 98.3 (16 May 2019 09:30), Max: 98.3 (16 May 2019 09:30)  HR: 70 (16 May 2019 11:30) (70 - 78)  BP: 138/64 (16 May 2019 11:30) (120/64 - 150/72)  BP(mean): --  ABP: --  ABP(mean): --  RR: 17 (16 May 2019 11:30) (17 - 19)  SpO2: 98% (16 May 2019 11:30) (98% - 100%)        CAPILLARY BLOOD GLUCOSE      POCT Blood Glucose.: 469 mg/dL (16 May 2019 11:27)      PHYSICAL EXAM:  General: Ill-appearing, in distress from pain during ultrasound exam  HEENT: PERRL, EOMI  Lymph Nodes: Lymphadenopathy  Neck: Supple, no JVD  Respiratory: CTA b/l anterior fields  Cardiovascular: +S1, S2, regular rate and rhythm, +systolic murmur, no rubs or gallops  Abdomen: Soft, nontender, nondistended, normal bowel sounds in all 4 quadrants  Extremities: Full ROM, no joint swelling or erythema  Skin: +2 pitting edema of LLE, no edema of RLE  Neurological: Awake and alert, following commands  Psychiatry: In distress from leg pain    LINES: [facility] MEDICATIONS:  aspirin enteric coated 81 milliGRAM(s) Oral daily  clopidogrel Tablet 75 milliGRAM(s) Oral daily  heparin  Injectable 5000 Unit(s) SubCutaneous every 12 hours  furosemide    Tablet 20 milliGRAM(s) Oral <User Schedule>  hydrALAZINE 25 milliGRAM(s) Oral three times a day  lisinopril 40 milliGRAM(s) Oral daily  metoprolol succinate ER 50 milliGRAM(s) Oral daily  atorvastatin 40 milliGRAM(s) Oral at bedtime  dextrose 40% Gel 15 Gram(s) Oral once PRN  dextrose 50% Injectable 12.5 Gram(s) IV Push once  dextrose 50% Injectable 25 Gram(s) IV Push once  dextrose 50% Injectable 25 Gram(s) IV Push once  glucagon  Injectable 1 milliGRAM(s) IntraMuscular once PRN  insulin lispro (HumaLOG) corrective regimen sliding scale   SubCutaneous three times a day before meals  insulin lispro (HumaLOG) corrective regimen sliding scale   SubCutaneous at bedtime  insulin lispro Injectable (HumaLOG) 3 Unit(s) SubCutaneous before breakfast  insulin lispro Injectable (HumaLOG) 3 Unit(s) SubCutaneous before lunch  insulin lispro Injectable (HumaLOG) 3 Unit(s) SubCutaneous before dinner  acetaminophen   Tablet .. 650 milliGRAM(s) Oral every 6 hours PRN  DULoxetine 60 milliGRAM(s) Oral daily  oxyCODONE    5 mG/acetaminophen 325 mG 1 Tablet(s) Oral every 6 hours PRN  dextrose 5%. 1000 milliLiter(s) IV Continuous <Continuous>  multivitamin 1 Tablet(s) Oral daily  sevelamer carbonate 1600 milliGRAM(s) Oral three times a day with meals      LABS:                        10.7   5.8   )-----------( 247      ( 16 May 2019 09:53 )             32.8     Hgb Trend: 10.7<--, 9.6<--  05-16    133<L>  |  89<L>  |  61<H>  ----------------------------<  517<HH>  8.3<HH>   |  22  |  5.76<H>    Ca    10.1      16 May 2019 11:25    TPro  7.9  /  Alb  4.5  /  TBili  0.5  /  DBili  x   /  AST  144<H>  /  ALT  72<H>  /  AlkPhos  100  05-16    Creatinine Trend: 5.76<--, 5.51<--, 2.99<--, 4.09<--, 5.93<--, 3.49<--      Venous Blood Gas:  05-16 @ 11:25  7.36/51/30/28/45  VBG Lactate: 2.0  Venous Blood Gas:  05-16 @ 09:53  7.38/50/25/29/32  VBG Lactate: 1.6      MICROBIOLOGY: No cultures    RADIOLOGY:    < from: Xray Chest 1 View AP/PA (05.16.19 @ 10:16) >  The mediastinal cardiac silhouette is unremarkable. Aortic   calcifications. Left subclavian vascular stent.    The lungs are clear.     No acute osseous finding.     IMPRESSION:    No acute process.    EKG: CHIEF COMPLAINT: Leg pain    HPI: Patient is a 63 year old female with a history of Type 1 DM, ESRD on HD M/Tu/Th/Sa, CAD s/p stents, CHF, COPD and CVA presenting with b/l leg pain. Patient states that the pain has been ongoing for the past day and has been progressively worsening. Patient seen and examined in vascular lab with patient's nephrologist Dr. Schmidt, who states that patient has had previous episodes of leg pain in the past in the setting of hyperkalemia. Patient normally gets HD 4 times per week, however for the past few weeks she states that she has not been able to attend her HD sessions on Monday as she was told by her HD center that it was full and could not see her on those days. She also normally is on an insulin pump, however it was taken off recently as she had prior admissions despite being on an insulin pump. She has been using insulin at home but states that "it's not working."     PAST MEDICAL & SURGICAL HISTORY:  COPD (chronic obstructive pulmonary disease)  Localized enlarged lymph nodes  CHF (congestive heart failure): EF 40-45%  Subclavian vein stenosis, left: s/p stent  DKA, type 1: 1/2015  ACS (acute coronary syndrome): 1/2015 - cath revealed 100% ostial stenosis not amenable to PCI - medical management  TIA (transient ischemic attack): x 2 - 8-9 years ago prior to ASD/VSD repair  CAD (coronary artery disease): s/p stents  Gout: past  CVA (cerebral infarction): with no residual, 8 yrs ago, prior to heart surgery - ST memory loss  Peripheral vascular disease: occluded left fem-pop bypass 5/2015  Diabetes mellitus type 1: Insulin Dependent -  ESRD (end stage renal disease): dialysis  M, tue, thursday, saturday  Hyperlipidemia  Status post device closure of ASD: &quot;clamshell&quot;  History of cardiac catheterization: 1/2015 - no intervention  S/P femoral-popliteal bypass surgery: L and R in 2013 with graft; 5/2015 CFA angioplasty left and ileofemoral endarterectomywith vein patch angioplasty of left fem-pop bypass graft  Multiple vascular surgery both leg, left fempop bypass revision 11/2015  AV (arteriovenous fistula): Left AV graft; revision with stent placement 2-3 years ago  S/P cholecystectomy      FAMILY HISTORY:  Family history of smoking  Family history of hypertension  Family history of cancer (Sibling)      SOCIAL HISTORY:  Smoking: [ ] Never Smoked [ ] Former Smoker (__ packs x ___ years) [ ] Current Smoker  (__ packs x ___ years)  Substance Use: [ ] Never Used [ ] Used ____  EtOH Use:  Marital Status: [ ] Single [ ]  [ ]  [ ]   Sexual History:   Occupation:  Recent Travel:  Country of Birth:  Advance Directives:    Allergies    No Known Allergies    Intolerances        HOME MEDICATIONS: Reviewed    REVIEW OF SYSTEMS:  Constitutional: [ ] negative [ ] fevers [ ] chills [ ] weight loss [ ] weight gain  HEENT: [ ] negative [ ] dry eyes [ ] eye irritation [ ] postnasal drip [ ] nasal congestion  CV: [ ] negative  [ ] chest pain [ ] orthopnea [ ] palpitations [ ] murmur  Resp: [ ] negative [ ] cough [X] shortness of breath [ ] dyspnea [ ] wheezing [ ] sputum [ ] hemoptysis  GI: [ ] negative [X] nausea [ ] vomiting [ ] diarrhea [ ] constipation [ ] abd pain [ ] dysphagia   : [ ] negative [ ] dysuria [ ] nocturia [ ] hematuria [ ] increased urinary frequency  Musculoskeletal: [ ] negative [ ] back pain [X] myalgias [ ] arthralgias [ ] fracture  Skin: [ ] negative [ ] rash [ ] itch  Neurological: [ ] negative [ ] headache [ ] dizziness [ ] syncope [X] weakness [ ] numbness  Psychiatric: [ ] negative [ ] anxiety [ ] depression  Endocrine: [ ] negative [X] diabetes [ ] thyroid problem  Hematologic/Lymphatic: [ ] negative [ ] anemia [ ] bleeding problem  Allergic/Immunologic: [ ] negative [ ] itchy eyes [ ] nasal discharge [ ] hives [ ] angioedema  [ ] All other systems negative  [ ] Unable to assess ROS because ________    OBJECTIVE:  ICU Vital Signs Last 24 Hrs  T(C): 36.8 (16 May 2019 09:30), Max: 36.8 (16 May 2019 09:30)  T(F): 98.3 (16 May 2019 09:30), Max: 98.3 (16 May 2019 09:30)  HR: 70 (16 May 2019 11:30) (70 - 78)  BP: 138/64 (16 May 2019 11:30) (120/64 - 150/72)  BP(mean): --  ABP: --  ABP(mean): --  RR: 17 (16 May 2019 11:30) (17 - 19)  SpO2: 98% (16 May 2019 11:30) (98% - 100%)        CAPILLARY BLOOD GLUCOSE      POCT Blood Glucose.: 469 mg/dL (16 May 2019 11:27)      PHYSICAL EXAM:  General: Ill-appearing, in distress from pain during ultrasound exam  HEENT: PERRL, EOMI  Lymph Nodes: Lymphadenopathy  Neck: Supple, no JVD  Respiratory: CTA b/l anterior fields  Cardiovascular: +S1, S2, regular rate and rhythm, +systolic murmur, no rubs or gallops  Abdomen: Soft, nontender, nondistended, normal bowel sounds in all 4 quadrants  Extremities: Full ROM, no joint swelling or erythema  Skin: +2 pitting edema of LLE, no edema of RLE  Neurological: Awake and alert, following commands  Psychiatry: In distress from leg pain    LINES: [facility] MEDICATIONS:  aspirin enteric coated 81 milliGRAM(s) Oral daily  clopidogrel Tablet 75 milliGRAM(s) Oral daily  heparin  Injectable 5000 Unit(s) SubCutaneous every 12 hours  furosemide    Tablet 20 milliGRAM(s) Oral <User Schedule>  hydrALAZINE 25 milliGRAM(s) Oral three times a day  lisinopril 40 milliGRAM(s) Oral daily  metoprolol succinate ER 50 milliGRAM(s) Oral daily  atorvastatin 40 milliGRAM(s) Oral at bedtime  dextrose 40% Gel 15 Gram(s) Oral once PRN  dextrose 50% Injectable 12.5 Gram(s) IV Push once  dextrose 50% Injectable 25 Gram(s) IV Push once  dextrose 50% Injectable 25 Gram(s) IV Push once  glucagon  Injectable 1 milliGRAM(s) IntraMuscular once PRN  insulin lispro (HumaLOG) corrective regimen sliding scale   SubCutaneous three times a day before meals  insulin lispro (HumaLOG) corrective regimen sliding scale   SubCutaneous at bedtime  insulin lispro Injectable (HumaLOG) 3 Unit(s) SubCutaneous before breakfast  insulin lispro Injectable (HumaLOG) 3 Unit(s) SubCutaneous before lunch  insulin lispro Injectable (HumaLOG) 3 Unit(s) SubCutaneous before dinner  acetaminophen   Tablet .. 650 milliGRAM(s) Oral every 6 hours PRN  DULoxetine 60 milliGRAM(s) Oral daily  oxyCODONE    5 mG/acetaminophen 325 mG 1 Tablet(s) Oral every 6 hours PRN  dextrose 5%. 1000 milliLiter(s) IV Continuous <Continuous>  multivitamin 1 Tablet(s) Oral daily  sevelamer carbonate 1600 milliGRAM(s) Oral three times a day with meals      LABS:                        10.7   5.8   )-----------( 247      ( 16 May 2019 09:53 )             32.8     Hgb Trend: 10.7<--, 9.6<--  05-16    133<L>  |  89<L>  |  61<H>  ----------------------------<  517<HH>  8.3<HH>   |  22  |  5.76<H>    Ca    10.1      16 May 2019 11:25    TPro  7.9  /  Alb  4.5  /  TBili  0.5  /  DBili  x   /  AST  144<H>  /  ALT  72<H>  /  AlkPhos  100  05-16    Creatinine Trend: 5.76<--, 5.51<--, 2.99<--, 4.09<--, 5.93<--, 3.49<--      Venous Blood Gas:  05-16 @ 11:25  7.36/51/30/28/45  VBG Lactate: 2.0  Venous Blood Gas:  05-16 @ 09:53  7.38/50/25/29/32  VBG Lactate: 1.6      MICROBIOLOGY: No cultures    RADIOLOGY:    < from: Xray Chest 1 View AP/PA (05.16.19 @ 10:16) >  The mediastinal cardiac silhouette is unremarkable. Aortic   calcifications. Left subclavian vascular stent.    The lungs are clear.     No acute osseous finding.     IMPRESSION:    No acute process.    EKG: NSR, rate 71, no ST changes or T wave inversions, no peaked T waves

## 2019-05-16 NOTE — ED PROVIDER NOTE - PROGRESS NOTE DETAILS
Dr Beavers paged  Rudy Bartholomew MD, Facep LE doppler performed to assess DP pulses. +good wave form pulses detected at DP site bilaterally. Attending aware, requested b/l LE venous duplex. - Maxwell Boudreaux PA-C CMP hemolyzed, repeat BMP pending. glucose >450, d/w attending advised give 6 units sq reg insulin, hold off on fluids for now given ESRD. - Maxwell Boudreaux PA-C Attempted to contact MICU per Dr. Viera request. Was transferred from floor to spectra w/ no answer. Will reattempt. - Maxwell Boudreaux PA-C Dr Brayan Bartholomew MD, Facep repeat labs showed K+ of 8.3. Dr. Schmidt immediately made aware, pt was on vascular floor getting study despite request from ED to hold off on transport until labs resulted. MICU resident, myself and Dr. Schmidt were up on vascular with pt, at bedside, he advised MICU to accept pt as pt needed emergent dialysis. Dr. Schmidt got dialysis bed for pt on floor and arranged for pt to be transported straight from vascular lab to dialysis. Dialysis lab called and stated they needed an admission order in prior to taking pt to dialysis. MICU discussed case w/ fellow and rejected pt, so pt was endorsed to Dr. Viera. Nurse currently giving report to dialysis RN. Pt stable. Admission orders in, attending aware. - Maxwell Boudreaux PA-C

## 2019-05-17 LAB
ANION GAP SERPL CALC-SCNC: 17 MMOL/L — SIGNIFICANT CHANGE UP (ref 5–17)
B-OH-BUTYR SERPL-SCNC: 0.1 MMOL/L — SIGNIFICANT CHANGE UP
BUN SERPL-MCNC: 32 MG/DL — HIGH (ref 7–23)
CALCIUM SERPL-MCNC: 10.2 MG/DL — SIGNIFICANT CHANGE UP (ref 8.4–10.5)
CHLORIDE SERPL-SCNC: 89 MMOL/L — LOW (ref 96–108)
CO2 SERPL-SCNC: 29 MMOL/L — SIGNIFICANT CHANGE UP (ref 22–31)
CREAT SERPL-MCNC: 3.91 MG/DL — HIGH (ref 0.5–1.3)
GLUCOSE BLDC GLUCOMTR-MCNC: 107 MG/DL — HIGH (ref 70–99)
GLUCOSE BLDC GLUCOMTR-MCNC: 143 MG/DL — HIGH (ref 70–99)
GLUCOSE BLDC GLUCOMTR-MCNC: 183 MG/DL — HIGH (ref 70–99)
GLUCOSE BLDC GLUCOMTR-MCNC: 85 MG/DL — SIGNIFICANT CHANGE UP (ref 70–99)
GLUCOSE SERPL-MCNC: 189 MG/DL — HIGH (ref 70–99)
HCT VFR BLD CALC: 29 % — LOW (ref 34.5–45)
HGB BLD-MCNC: 9.2 G/DL — LOW (ref 11.5–15.5)
MCHC RBC-ENTMCNC: 31.7 GM/DL — LOW (ref 32–36)
MCHC RBC-ENTMCNC: 33.7 PG — SIGNIFICANT CHANGE UP (ref 27–34)
MCV RBC AUTO: 106.2 FL — HIGH (ref 80–100)
PLATELET # BLD AUTO: 240 K/UL — SIGNIFICANT CHANGE UP (ref 150–400)
POTASSIUM SERPL-MCNC: 5.5 MMOL/L — HIGH (ref 3.5–5.3)
POTASSIUM SERPL-SCNC: 5.5 MMOL/L — HIGH (ref 3.5–5.3)
RBC # BLD: 2.73 M/UL — LOW (ref 3.8–5.2)
RBC # FLD: 15.1 % — HIGH (ref 10.3–14.5)
SODIUM SERPL-SCNC: 135 MMOL/L — SIGNIFICANT CHANGE UP (ref 135–145)
WBC # BLD: 4.71 K/UL — SIGNIFICANT CHANGE UP (ref 3.8–10.5)
WBC # FLD AUTO: 4.71 K/UL — SIGNIFICANT CHANGE UP (ref 3.8–10.5)

## 2019-05-17 PROCEDURE — 99232 SBSQ HOSP IP/OBS MODERATE 35: CPT

## 2019-05-17 PROCEDURE — 93990 DOPPLER FLOW TESTING: CPT | Mod: 26

## 2019-05-17 RX ADMIN — Medication 20 MILLIGRAM(S): at 08:17

## 2019-05-17 RX ADMIN — OXYCODONE AND ACETAMINOPHEN 1 TABLET(S): 5; 325 TABLET ORAL at 07:00

## 2019-05-17 RX ADMIN — DULOXETINE HYDROCHLORIDE 60 MILLIGRAM(S): 30 CAPSULE, DELAYED RELEASE ORAL at 12:06

## 2019-05-17 RX ADMIN — CLOPIDOGREL BISULFATE 75 MILLIGRAM(S): 75 TABLET, FILM COATED ORAL at 12:06

## 2019-05-17 RX ADMIN — SEVELAMER CARBONATE 1600 MILLIGRAM(S): 2400 POWDER, FOR SUSPENSION ORAL at 12:07

## 2019-05-17 RX ADMIN — Medication 50 MILLIGRAM(S): at 05:06

## 2019-05-17 RX ADMIN — Medication 81 MILLIGRAM(S): at 12:06

## 2019-05-17 RX ADMIN — SEVELAMER CARBONATE 1600 MILLIGRAM(S): 2400 POWDER, FOR SUSPENSION ORAL at 18:46

## 2019-05-17 RX ADMIN — Medication 1: at 08:16

## 2019-05-17 RX ADMIN — Medication 3 UNIT(S): at 08:16

## 2019-05-17 RX ADMIN — Medication 3 UNIT(S): at 18:46

## 2019-05-17 RX ADMIN — Medication 25 MILLIGRAM(S): at 13:30

## 2019-05-17 RX ADMIN — Medication 25 MILLIGRAM(S): at 05:06

## 2019-05-17 RX ADMIN — Medication 3 UNIT(S): at 12:06

## 2019-05-17 RX ADMIN — LISINOPRIL 40 MILLIGRAM(S): 2.5 TABLET ORAL at 05:06

## 2019-05-17 RX ADMIN — SEVELAMER CARBONATE 1600 MILLIGRAM(S): 2400 POWDER, FOR SUSPENSION ORAL at 08:17

## 2019-05-17 RX ADMIN — OXYCODONE AND ACETAMINOPHEN 1 TABLET(S): 5; 325 TABLET ORAL at 04:56

## 2019-05-17 RX ADMIN — Medication 1 TABLET(S): at 12:06

## 2019-05-17 NOTE — PROGRESS NOTE ADULT - SUBJECTIVE AND OBJECTIVE BOX
Chief Complaint: Evaluating this 64 y/o F for uncontrolled Type 1 DM w/ hyperglycemia w/ ESRD on HD      Interval History: +fatigue. Not eating much. Glucose values at goal. Had HD yesterday. Still with LE pain and swelling.     MEDICATIONS  (STANDING):  aspirin enteric coated 81 milliGRAM(s) Oral daily  atorvastatin 40 milliGRAM(s) Oral at bedtime  clopidogrel Tablet 75 milliGRAM(s) Oral daily  dextrose 5%. 1000 milliLiter(s) (50 mL/Hr) IV Continuous <Continuous>  dextrose 50% Injectable 12.5 Gram(s) IV Push once  dextrose 50% Injectable 25 Gram(s) IV Push once  dextrose 50% Injectable 25 Gram(s) IV Push once  DULoxetine 60 milliGRAM(s) Oral daily  furosemide    Tablet 20 milliGRAM(s) Oral <User Schedule>  heparin  Injectable 5000 Unit(s) SubCutaneous every 12 hours  hydrALAZINE 25 milliGRAM(s) Oral three times a day  insulin glargine Injectable (LANTUS) 6 Unit(s) SubCutaneous at bedtime  insulin lispro (HumaLOG) corrective regimen sliding scale   SubCutaneous three times a day before meals  insulin lispro (HumaLOG) corrective regimen sliding scale   SubCutaneous at bedtime  insulin lispro Injectable (HumaLOG) 3 Unit(s) SubCutaneous before breakfast  insulin lispro Injectable (HumaLOG) 3 Unit(s) SubCutaneous before lunch  insulin lispro Injectable (HumaLOG) 3 Unit(s) SubCutaneous before dinner  lisinopril 40 milliGRAM(s) Oral daily  metoprolol succinate ER 50 milliGRAM(s) Oral daily  multivitamin 1 Tablet(s) Oral daily  sevelamer carbonate 1600 milliGRAM(s) Oral three times a day with meals    MEDICATIONS  (PRN):  acetaminophen   Tablet .. 650 milliGRAM(s) Oral every 6 hours PRN Temp greater or equal to 38.5C (101.3F), Mild Pain (1 - 3)  dextrose 40% Gel 15 Gram(s) Oral once PRN Blood Glucose LESS THAN 70 milliGRAM(s)/deciliter  glucagon  Injectable 1 milliGRAM(s) IntraMuscular once PRN Glucose LESS THAN 70 milligrams/deciliter  insulin lispro Injectable (HumaLOG) 2 Unit(s) SubCutaneous at bedtime PRN snack  oxyCODONE    5 mG/acetaminophen 325 mG 1 Tablet(s) Oral every 6 hours PRN Moderate Pain (4 - 6)      Allergies    No Known Allergies    Intolerances      Review of Systems:  Constitutional: No fever, decreased appetite.   Eyes: No blurry vision  Cardiovascular: No chest pain  Respiratory: No SOB  GI: No abdominal pain, No nausea, No vomiting  Endocrine: as noted in HPI    All other negative      PHYSICAL EXAM:  VITALS: T(C): 36.8 (05-17-19 @ 12:31)  T(F): 98.2 (05-17-19 @ 12:31), Max: 98.6 (05-17-19 @ 04:00)  HR: 78 (05-17-19 @ 12:31) (76 - 89)  BP: 153/78 (05-17-19 @ 12:31) (132/71 - 165/85)  RR:  (17 - 18)  SpO2:  (94% - 100%)  Wt(kg): --  GENERAL: NAD at this time  EYES: EOMI, No proptosis  HEENT:  Atraumatic, Normocephalic,   RESPIRATORY: full excursion, non labored  CARDIOVASCULAR: Regular rhythm; normal S1/S2, +b/l LE peripheral edema  GI: Soft, nontender, non distended, normal bowel sounds  SKIN: Warm and dry  PSYCH: flat affect      POCT Blood Glucose.: 143 mg/dL (05-17-19 @ 11:45)  POCT Blood Glucose.: 183 mg/dL (05-17-19 @ 07:47)  POCT Blood Glucose.: 326 mg/dL (05-16-19 @ 22:12)  POCT Blood Glucose.: 278 mg/dL (05-16-19 @ 21:01)  POCT Blood Glucose.: 142 mg/dL (05-16-19 @ 17:31)  POCT Blood Glucose.: 469 mg/dL (05-16-19 @ 11:27)        05-17    135  |  89<L>  |  32<H>  ----------------------------<  189<H>  5.5<H>   |  29  |  3.91<H>    EGFR if : 14<L>  EGFR if non : 12<L>    Ca    10.2      05-17    TPro  7.9  /  Alb  4.5  /  TBili  0.5  /  DBili  x   /  AST  144<H>  /  ALT  72<H>  /  AlkPhos  100  05-16        Thyroid Function Tests:      Hemoglobin A1C, Whole Blood: 8.8 % <H> [4.0 - 5.6] (04-30-19 @ 08:58)  Hemoglobin A1C, Whole Blood: 7.9 % <H> [4.0 - 5.6] (02-27-19 @ 11:08)

## 2019-05-17 NOTE — PROGRESS NOTE ADULT - SUBJECTIVE AND OBJECTIVE BOX
[facility]   [phone]  DIALYSIS NOTE  Chief Complaint: ESRD/Ongoing hemodialysis requirement. seen on hd    24 hour events/subjective:    states as of this am cramps have dissipated         ALLERGIES & MEDICATIONS  --------------------------------------------------------------------------------  Allergies    No Known Allergies    Intolerances      Standing Inpatient Medications  aspirin enteric coated 81 milliGRAM(s) Oral daily  atorvastatin 40 milliGRAM(s) Oral at bedtime  clopidogrel Tablet 75 milliGRAM(s) Oral daily  dextrose 5%. 1000 milliLiter(s) IV Continuous <Continuous>  dextrose 50% Injectable 12.5 Gram(s) IV Push once  dextrose 50% Injectable 25 Gram(s) IV Push once  dextrose 50% Injectable 25 Gram(s) IV Push once  DULoxetine 60 milliGRAM(s) Oral daily  furosemide    Tablet 20 milliGRAM(s) Oral <User Schedule>  heparin  Injectable 5000 Unit(s) SubCutaneous every 12 hours  hydrALAZINE 25 milliGRAM(s) Oral three times a day  insulin glargine Injectable (LANTUS) 6 Unit(s) SubCutaneous at bedtime  insulin lispro (HumaLOG) corrective regimen sliding scale   SubCutaneous three times a day before meals  insulin lispro (HumaLOG) corrective regimen sliding scale   SubCutaneous at bedtime  insulin lispro Injectable (HumaLOG) 3 Unit(s) SubCutaneous before breakfast  insulin lispro Injectable (HumaLOG) 3 Unit(s) SubCutaneous before lunch  insulin lispro Injectable (HumaLOG) 3 Unit(s) SubCutaneous before dinner  lisinopril 40 milliGRAM(s) Oral daily  metoprolol succinate ER 50 milliGRAM(s) Oral daily  multivitamin 1 Tablet(s) Oral daily  sevelamer carbonate 1600 milliGRAM(s) Oral three times a day with meals    PRN Inpatient Medications  acetaminophen   Tablet .. 650 milliGRAM(s) Oral every 6 hours PRN  dextrose 40% Gel 15 Gram(s) Oral once PRN  glucagon  Injectable 1 milliGRAM(s) IntraMuscular once PRN  insulin lispro Injectable (HumaLOG) 2 Unit(s) SubCutaneous at bedtime PRN  oxyCODONE    5 mG/acetaminophen 325 mG 1 Tablet(s) Oral every 6 hours PRN      REVIEW OF SYSTEMS  --------------------------------------------------------------------------------  no itching or rash  no fever or chill  no cp or palp   no sob or cough   no N/V/D/ no abd pain   ext  +  edema no pain         VITALS/PHYSICAL EXAM  --------------------------------------------------------------------------------  T(C): 36.8 (05-17-19 @ 20:03), Max: 37 (05-17-19 @ 04:00)  HR: 76 (05-17-19 @ 20:03) (76 - 79)  BP: 133/77 (05-17-19 @ 20:03) (132/71 - 153/78)  RR: 18 (05-17-19 @ 20:03) (17 - 18)  SpO2: 93% (05-17-19 @ 20:03) (93% - 97%)  Wt(kg): --  Height (cm): 160.02 (05-16-19 @ 17:19)  Weight (kg): 54.4 (05-16-19 @ 17:19)  BMI (kg/m2): 21.2 (05-16-19 @ 17:19)  BSA (m2): 1.56 (05-16-19 @ 17:19)      05-16-19 @ 07:01  -  05-17-19 @ 07:00  --------------------------------------------------------  IN: 900 mL / OUT: 2900 mL / NET: -2000 mL    05-17-19 @ 07:01  -  05-17-19 @ 22:28  --------------------------------------------------------  IN: 600 mL / OUT: 0 mL / NET: 600 mL      Physical Exam:  		  	Gen: Non toxic comfortable appearing   	no jvd , supple neck,   	Pulm: decrease bs  no rales or ronchi or wheezing  	CV: RRR, S1S2; no rub  	Abd: +BS, soft, nontender/nondistended  	: No suprapubic tenderness  	UE: Warm, no cyanosis  no clubbing,  no edema;   	LE: Warm, no cyanosis  no clubbing, 2+  edema  	Neuro: alert and oriented. speech coherent   	    LABS/STUDIES  --------------------------------------------------------------------------------              9.2    4.71  >-----------<  240      [05-17-19 @ 08:41]              29.0     135  |  89  |  32  ----------------------------<  189      [05-17-19 @ 07:12]  5.5   |  29  |  3.91        Ca     10.2     [05-17-19 @ 07:12]    TPro  7.9  /  Alb  4.5  /  TBili  0.5  /  DBili  x   /  AST  144  /  ALT  72  /  AlkPhos  100  [05-16-19 @ 09:53]

## 2019-05-17 NOTE — CONSULT NOTE ADULT - SUBJECTIVE AND OBJECTIVE BOX
CHIEF COMPLAINT: edema    HISTORY OF PRESENT ILLNESS:  62 y/o Female PMH CAD Sp PTCA w stents CHF,COPD, CVA, DMT1, ESRD on HD (M,Tu,Th,Sa), Gout, HLD, PVD, Sublcavian Stensosis (L) sp stent. PSH ASD Repair,idania,fem-pop bypass. Last dc from hospt approx 2wk ago now comes to ED via ems complains of lower extr swelling maribell with pain. EMS report pt in mod pain necessitating fentanyl and that pt seemed very sleepy. Pt comes to ed complains of maribell lower extr pain onset yesterday "real bad". Pt has reported le pain in past without clear dx according to pt. No hx trauma. No fever chiil shortness of breath cheat pain. Pain improves with ambulation.      Allergies  No Known Allergies      MEDICATIONS:  aspirin enteric coated 81 milliGRAM(s) Oral daily  clopidogrel Tablet 75 milliGRAM(s) Oral daily  furosemide    Tablet 20 milliGRAM(s) Oral <User Schedule>  heparin  Injectable 5000 Unit(s) SubCutaneous every 12 hours  hydrALAZINE 25 milliGRAM(s) Oral three times a day  lisinopril 40 milliGRAM(s) Oral daily  metoprolol succinate ER 50 milliGRAM(s) Oral daily  acetaminophen   Tablet .. 650 milliGRAM(s) Oral every 6 hours PRN  DULoxetine 60 milliGRAM(s) Oral daily  oxyCODONE    5 mG/acetaminophen 325 mG 1 Tablet(s) Oral every 6 hours PRN  atorvastatin 40 milliGRAM(s) Oral at bedtime  dextrose 40% Gel 15 Gram(s) Oral once PRN  dextrose 50% Injectable 12.5 Gram(s) IV Push once  dextrose 50% Injectable 25 Gram(s) IV Push once  dextrose 50% Injectable 25 Gram(s) IV Push once  glucagon  Injectable 1 milliGRAM(s) IntraMuscular once PRN  insulin glargine Injectable (LANTUS) 6 Unit(s) SubCutaneous at bedtime  insulin lispro (HumaLOG) corrective regimen sliding scale   SubCutaneous three times a day before meals  insulin lispro (HumaLOG) corrective regimen sliding scale   SubCutaneous at bedtime  insulin lispro Injectable (HumaLOG) 3 Unit(s) SubCutaneous before breakfast  insulin lispro Injectable (HumaLOG) 3 Unit(s) SubCutaneous before lunch  insulin lispro Injectable (HumaLOG) 3 Unit(s) SubCutaneous before dinner  insulin lispro Injectable (HumaLOG) 2 Unit(s) SubCutaneous at bedtime PRN  dextrose 5%. 1000 milliLiter(s) IV Continuous <Continuous>  multivitamin 1 Tablet(s) Oral daily      PAST MEDICAL & SURGICAL HISTORY:  COPD (chronic obstructive pulmonary disease)  Localized enlarged lymph nodes  CHF (congestive heart failure): EF 40-45%  Subclavian vein stenosis, left: s/p stent  DKA, type 1: 1/2015  ACS (acute coronary syndrome): 1/2015 - cath revealed 100% ostial stenosis not amenable to PCI - medical management  TIA (transient ischemic attack): x 2 - 8-9 years ago prior to ASD/VSD repair  CAD (coronary artery disease): s/p stents  Gout: past  CVA (cerebral infarction): with no residual, 8 yrs ago, prior to heart surgery - ST memory loss  Peripheral vascular disease: occluded left fem-pop bypass 5/2015  Diabetes mellitus type 1: Insulin Dependent -  ESRD (end stage renal disease): dialysis  M, tue, thursday, saturday  Hyperlipidemia  Status post device closure of ASD: &quot;[place]&quot;  History of cardiac catheterization: 1/2015 - no intervention  S/P femoral-popliteal bypass surgery: L and R in 2013 with graft; 5/2015 CFA angioplasty left and ileofemoral endarterectomywith vein patch angioplasty of left fem-pop bypass graft  Multiple vascular surgery both leg, left fempop bypass revision 11/2015  AV (arteriovenous fistula): Left AV graft; revision with stent placement 2-3 years ago  S/P cholecystectomy      FAMILY HISTORY:  Family history of smoking  Family history of hypertension  Family history of cancer (Sibling)      SOCIAL HISTORY:    former smoker. lives with . independent in [place]      REVIEW OF SYSTEMS:  See HPI, otherwise complete 10 point review of systems negative    [ ] All others negative	      PHYSICAL EXAM:  T(C): 37 (05-17-19 @ 04:00), Max: 37 (05-17-19 @ 04:00)  HR: 76 (05-17-19 @ 05:05) (70 - 89)  BP: 143/79 (05-17-19 @ 05:05) (120/64 - 165/85)  RR: 18 (05-17-19 @ 04:00) (16 - 19)  SpO2: 96% (05-17-19 @ 04:00) (96% - 100%)  Wt(kg): --  I&O's Summary    16 May 2019 07:01  -  17 May 2019 07:00  --------------------------------------------------------  IN: 900 mL / OUT: 2900 mL / NET: -2000 mL        Appearance: No Acute Distress	  HEENT:  Normal oral mucosa, PERRL, EOMI	  Cardiovascular: Normal S1 S2, No JVD, No murmurs/rubs/gallops  Respiratory: Lungs clear to auscultation bilaterally  Gastrointestinal:  Soft, Non-tender, + BS	  Skin: No rashes, No ecchymoses, No cyanosis	  Neurologic: Non-focal  Extremities: No clubbing, cyanosis or edema  Vascular: Peripheral pulses palpable 2+ bilaterally  Psychiatry: A & O x 3, Mood & affect appropriate    Laboratory Data:	 	    CBC Full  -  ( 16 May 2019 09:53 )  WBC Count : 5.8 K/uL  Hemoglobin : 10.7 g/dL  Hematocrit : 32.8 %  Platelet Count - Automated : 247 K/uL  Mean Cell Volume : 104.0 fl  Mean Cell Hemoglobin : 34.1 pg  Mean Cell Hemoglobin Concentration : 32.6 gm/dL  Auto Neutrophil # : x  Auto Lymphocyte # : x  Auto Monocyte # : x  Auto Eosinophil # : x  Auto Basophil # : x  Auto Neutrophil % : x  Auto Lymphocyte % : x  Auto Monocyte % : x  Auto Eosinophil % : x  Auto Basophil % : x    05-17    135  |  89<L>  |  32<H>  ----------------------------<  189<H>  5.5<H>   |  29  |  3.91<H>  05-16    x   |  x   |  x   ----------------------------<  x   5.5<H>   |  x   |  x     Ca    10.2      17 May 2019 07:12  Ca    10.1      16 May 2019 11:25    TPro  7.9  /  Alb  4.5  /  TBili  0.5  /  DBili  x   /  AST  144<H>  /  ALT  72<H>  /  AlkPhos  100  05-16        ECG:  	nsr nonspecific st changes      Assessment:  -leg pain  -hyperkalemia  -recurrent DKA  -volume overload  -ischemic cardiomyopathy, cad s/p multiple stents  -esrd on hd  -PAD s/p prior intervention   -      Recs:  -leg pain, hx of PAD. sx not consistent with CLI. no obvious infection. possibly related to edema 2/2 volume overload. c/w HD. further management of PAD as otupatient  -volume overload --> c/w HD to maintain euvolemia  -hyperglycemia --> f/u endo  -ischemic cardiomyopathy s/p LHC with Dr Vela 2/20 --> severe and diffuse instent restenosis along RCA --> unable to stent due to multiple layers of stenting and prior brachytherapy. we can consider complex intervention if true ischemic sx develop. c/w medical treatment with anti-platelet, statins and anti-anginals for now.  RESUME metop succinate 50mg daily for anti-anginal effect and for pAT/NSVT  -resume beta blockers for nsvt/pat/cad (as above)  -hx of prolonged qtc. avoid qtc prolonging meds  -s/p recent TTE: mod-severe MR, pseudo AS (low flow-low gradient), mod-severe LV dysfunction --> recent CTS consult with dr anup donaldson. plan is for medical management given surgical risk and patient preference  -c/w asa, plavix, statin for ischemic cardiomyopathy/CAD/PAD  -dvt ppx      Greater than 60 minutes spent on total encounter; more than 50% of the visit was spent counseling and/or coordinating care by the attending physician.   	  Julián Biswas MD   Cardiovascular Diseases  [phone]

## 2019-05-17 NOTE — PROGRESS NOTE ADULT - SUBJECTIVE AND OBJECTIVE BOX
Patient is a 63y old  Female who presents with a chief complaint of edema, legs pain (17 May 2019 07:53)      SUBJECTIVE / OVERNIGHT EVENTS: Comfortable without new complaints.   Review of Systems  chest pain no  palpitations no  sob no  nausea no  headache no    MEDICATIONS  (STANDING):  aspirin enteric coated 81 milliGRAM(s) Oral daily  atorvastatin 40 milliGRAM(s) Oral at bedtime  clopidogrel Tablet 75 milliGRAM(s) Oral daily  dextrose 5%. 1000 milliLiter(s) (50 mL/Hr) IV Continuous <Continuous>  dextrose 50% Injectable 12.5 Gram(s) IV Push once  dextrose 50% Injectable 25 Gram(s) IV Push once  dextrose 50% Injectable 25 Gram(s) IV Push once  DULoxetine 60 milliGRAM(s) Oral daily  furosemide    Tablet 20 milliGRAM(s) Oral <User Schedule>  heparin  Injectable 5000 Unit(s) SubCutaneous every 12 hours  hydrALAZINE 25 milliGRAM(s) Oral three times a day  insulin glargine Injectable (LANTUS) 6 Unit(s) SubCutaneous at bedtime  insulin lispro (HumaLOG) corrective regimen sliding scale   SubCutaneous three times a day before meals  insulin lispro (HumaLOG) corrective regimen sliding scale   SubCutaneous at bedtime  insulin lispro Injectable (HumaLOG) 3 Unit(s) SubCutaneous before breakfast  insulin lispro Injectable (HumaLOG) 3 Unit(s) SubCutaneous before lunch  insulin lispro Injectable (HumaLOG) 3 Unit(s) SubCutaneous before dinner  lisinopril 40 milliGRAM(s) Oral daily  metoprolol succinate ER 50 milliGRAM(s) Oral daily  multivitamin 1 Tablet(s) Oral daily  sevelamer carbonate 1600 milliGRAM(s) Oral three times a day with meals    MEDICATIONS  (PRN):  acetaminophen   Tablet .. 650 milliGRAM(s) Oral every 6 hours PRN Temp greater or equal to 38.5C (101.3F), Mild Pain (1 - 3)  dextrose 40% Gel 15 Gram(s) Oral once PRN Blood Glucose LESS THAN 70 milliGRAM(s)/deciliter  glucagon  Injectable 1 milliGRAM(s) IntraMuscular once PRN Glucose LESS THAN 70 milligrams/deciliter  insulin lispro Injectable (HumaLOG) 2 Unit(s) SubCutaneous at bedtime PRN snack  oxyCODONE    5 mG/acetaminophen 325 mG 1 Tablet(s) Oral every 6 hours PRN Moderate Pain (4 - 6)      Vital Signs Last 24 Hrs  T(C): 37 (17 May 2019 04:00), Max: 37 (17 May 2019 04:00)  T(F): 98.6 (17 May 2019 04:00), Max: 98.6 (17 May 2019 04:00)  HR: 76 (17 May 2019 05:05) (72 - 89)  BP: 143/79 (17 May 2019 05:05) (132/71 - 165/85)  BP(mean): --  RR: 18 (17 May 2019 04:00) (16 - 18)  SpO2: 96% (17 May 2019 04:00) (96% - 100%)    PHYSICAL EXAM:  GENERAL: weak  HEAD:  Atraumatic, Normocephalic  EYES: EOMI, PERRLA, conjunctiva and sclera clear  NECK: Supple, No JVD  CHEST/LUNG: Clear to auscultation bilaterally; No wheeze  HEART: Regular rate and rhythm; No murmurs, rubs, or gallops  ABDOMEN: Soft, Nontender, Nondistended; Bowel sounds present  EXTREMITIES:  1+ bipedal edema  PSYCH: AAOx3  NEUROLOGY: non-focal  SKIN: No rashes or lesions    LABS:                        9.2    4.71  )-----------( 240      ( 17 May 2019 08:41 )             29.0     05-17    135  |  89<L>  |  32<H>  ----------------------------<  189<H>  5.5<H>   |  29  |  3.91<H>    Ca    10.2      17 May 2019 07:12    TPro  7.9  /  Alb  4.5  /  TBili  0.5  /  DBili  x   /  AST  144<H>  /  ALT  72<H>  /  AlkPhos  100  05-16                RADIOLOGY & ADDITIONAL TESTS:    Imaging Personally Reviewed:    Consultant(s) Notes Reviewed:      Care Discussed with Consultants/Other Providers:

## 2019-05-17 NOTE — PROGRESS NOTE ADULT - PROBLEM SELECTOR PLAN 1
Goal glucose 100-200. No need for tight glycemic control given brittle diabetes and hypoglycemic risk.   Continue with Lantus 6 and Humalog 3 for now. Can give Humalog 2 units for bedtime snack prn. Continue with low correction scale.  Will adjust as needed.

## 2019-05-18 DIAGNOSIS — E10.22 TYPE 1 DIABETES MELLITUS WITH DIABETIC CHRONIC KIDNEY DISEASE: ICD-10-CM

## 2019-05-18 LAB
ANION GAP SERPL CALC-SCNC: 13 MMOL/L — SIGNIFICANT CHANGE UP (ref 5–17)
BUN SERPL-MCNC: 16 MG/DL — SIGNIFICANT CHANGE UP (ref 7–23)
CALCIUM SERPL-MCNC: 9.4 MG/DL — SIGNIFICANT CHANGE UP (ref 8.4–10.5)
CHLORIDE SERPL-SCNC: 98 MMOL/L — SIGNIFICANT CHANGE UP (ref 96–108)
CO2 SERPL-SCNC: 30 MMOL/L — SIGNIFICANT CHANGE UP (ref 22–31)
CREAT SERPL-MCNC: 2.84 MG/DL — HIGH (ref 0.5–1.3)
GLUCOSE BLDC GLUCOMTR-MCNC: 106 MG/DL — HIGH (ref 70–99)
GLUCOSE BLDC GLUCOMTR-MCNC: 135 MG/DL — HIGH (ref 70–99)
GLUCOSE BLDC GLUCOMTR-MCNC: 144 MG/DL — HIGH (ref 70–99)
GLUCOSE BLDC GLUCOMTR-MCNC: 232 MG/DL — HIGH (ref 70–99)
GLUCOSE BLDC GLUCOMTR-MCNC: 84 MG/DL — SIGNIFICANT CHANGE UP (ref 70–99)
GLUCOSE SERPL-MCNC: 145 MG/DL — HIGH (ref 70–99)
HCT VFR BLD CALC: 28.7 % — LOW (ref 34.5–45)
HGB BLD-MCNC: 9.3 G/DL — LOW (ref 11.5–15.5)
MCHC RBC-ENTMCNC: 32.4 GM/DL — SIGNIFICANT CHANGE UP (ref 32–36)
MCHC RBC-ENTMCNC: 33.8 PG — SIGNIFICANT CHANGE UP (ref 27–34)
MCV RBC AUTO: 104 FL — HIGH (ref 80–100)
PLATELET # BLD AUTO: 207 K/UL — SIGNIFICANT CHANGE UP (ref 150–400)
POTASSIUM SERPL-MCNC: 4.5 MMOL/L — SIGNIFICANT CHANGE UP (ref 3.5–5.3)
POTASSIUM SERPL-SCNC: 4.5 MMOL/L — SIGNIFICANT CHANGE UP (ref 3.5–5.3)
RBC # BLD: 2.76 M/UL — LOW (ref 3.8–5.2)
RBC # FLD: 14 % — SIGNIFICANT CHANGE UP (ref 10.3–14.5)
SODIUM SERPL-SCNC: 141 MMOL/L — SIGNIFICANT CHANGE UP (ref 135–145)
WBC # BLD: 4.1 K/UL — SIGNIFICANT CHANGE UP (ref 3.8–10.5)
WBC # FLD AUTO: 4.1 K/UL — SIGNIFICANT CHANGE UP (ref 3.8–10.5)

## 2019-05-18 PROCEDURE — 99232 SBSQ HOSP IP/OBS MODERATE 35: CPT

## 2019-05-18 RX ORDER — ERYTHROPOIETIN 10000 [IU]/ML
10000 INJECTION, SOLUTION INTRAVENOUS; SUBCUTANEOUS ONCE
Refills: 0 | Status: COMPLETED | OUTPATIENT
Start: 2019-05-18 | End: 2019-05-18

## 2019-05-18 RX ADMIN — SEVELAMER CARBONATE 1600 MILLIGRAM(S): 2400 POWDER, FOR SUSPENSION ORAL at 17:17

## 2019-05-18 RX ADMIN — Medication 25 MILLIGRAM(S): at 00:13

## 2019-05-18 RX ADMIN — SEVELAMER CARBONATE 1600 MILLIGRAM(S): 2400 POWDER, FOR SUSPENSION ORAL at 08:00

## 2019-05-18 RX ADMIN — ERYTHROPOIETIN 10000 UNIT(S): 10000 INJECTION, SOLUTION INTRAVENOUS; SUBCUTANEOUS at 14:24

## 2019-05-18 RX ADMIN — ATORVASTATIN CALCIUM 40 MILLIGRAM(S): 80 TABLET, FILM COATED ORAL at 00:12

## 2019-05-18 RX ADMIN — DULOXETINE HYDROCHLORIDE 60 MILLIGRAM(S): 30 CAPSULE, DELAYED RELEASE ORAL at 17:17

## 2019-05-18 RX ADMIN — Medication 25 MILLIGRAM(S): at 05:22

## 2019-05-18 RX ADMIN — CLOPIDOGREL BISULFATE 75 MILLIGRAM(S): 75 TABLET, FILM COATED ORAL at 17:17

## 2019-05-18 RX ADMIN — INSULIN GLARGINE 6 UNIT(S): 100 INJECTION, SOLUTION SUBCUTANEOUS at 21:46

## 2019-05-18 RX ADMIN — Medication 1 TABLET(S): at 12:00

## 2019-05-18 RX ADMIN — OXYCODONE AND ACETAMINOPHEN 1 TABLET(S): 5; 325 TABLET ORAL at 23:21

## 2019-05-18 RX ADMIN — OXYCODONE AND ACETAMINOPHEN 1 TABLET(S): 5; 325 TABLET ORAL at 06:20

## 2019-05-18 RX ADMIN — INSULIN GLARGINE 6 UNIT(S): 100 INJECTION, SOLUTION SUBCUTANEOUS at 00:18

## 2019-05-18 RX ADMIN — OXYCODONE AND ACETAMINOPHEN 1 TABLET(S): 5; 325 TABLET ORAL at 01:05

## 2019-05-18 RX ADMIN — OXYCODONE AND ACETAMINOPHEN 1 TABLET(S): 5; 325 TABLET ORAL at 22:51

## 2019-05-18 RX ADMIN — Medication 25 MILLIGRAM(S): at 21:46

## 2019-05-18 RX ADMIN — LISINOPRIL 40 MILLIGRAM(S): 2.5 TABLET ORAL at 05:22

## 2019-05-18 RX ADMIN — OXYCODONE AND ACETAMINOPHEN 1 TABLET(S): 5; 325 TABLET ORAL at 00:13

## 2019-05-18 RX ADMIN — Medication 3 UNIT(S): at 12:00

## 2019-05-18 RX ADMIN — SEVELAMER CARBONATE 1600 MILLIGRAM(S): 2400 POWDER, FOR SUSPENSION ORAL at 12:00

## 2019-05-18 RX ADMIN — Medication 3 UNIT(S): at 08:00

## 2019-05-18 RX ADMIN — Medication 50 MILLIGRAM(S): at 05:22

## 2019-05-18 RX ADMIN — Medication 20 MILLIGRAM(S): at 08:00

## 2019-05-18 RX ADMIN — ATORVASTATIN CALCIUM 40 MILLIGRAM(S): 80 TABLET, FILM COATED ORAL at 21:46

## 2019-05-18 RX ADMIN — OXYCODONE AND ACETAMINOPHEN 1 TABLET(S): 5; 325 TABLET ORAL at 07:20

## 2019-05-18 RX ADMIN — Medication 81 MILLIGRAM(S): at 17:17

## 2019-05-18 NOTE — PROGRESS NOTE ADULT - SUBJECTIVE AND OBJECTIVE BOX
Cardiovascular Disease Progress Note    Overnight events: No acute events overnight. still with pain on bottom of feet. no new cardiac sx   Otherwise review of systems negative    Objective Findings:  T(C): 37 (19 @ 04:03), Max: 37 (19 @ 04:03)  HR: 74 (19 @ 04:03) (70 - 86)  BP: 143/78 (19 @ 04:03) (133/70 - 153/78)  RR: 18 (19 @ 04:03) (17 - 20)  SpO2: 92% (19 @ 04:03) (92% - 98%)  Wt(kg): --  Daily     Daily Weight in k.8 (17 May 2019 23:25)      Physical Exam:  Gen: NAD  HEENT: EOMI  CV: RRR, normal S1 + S2, no m/r/g  Lungs: CTAB  Abd: soft, non-tender  Ext: mild edema    Telemetry: nsr    Laboratory Data:                        9.3    4.1   )-----------( 207      ( 18 May 2019 05:21 )             28.7     -    141  |  98  |  16  ----------------------------<  145<H>  4.5   |  30  |  2.84<H>    Ca    9.4      18 May 2019 05:21    TPro  7.9  /  Alb  4.5  /  TBili  0.5  /  DBili  x   /  AST  144<H>  /  ALT  72<H>  /  AlkPhos  100  05-16              Inpatient Medications:  MEDICATIONS  (STANDING):  aspirin enteric coated 81 milliGRAM(s) Oral daily  atorvastatin 40 milliGRAM(s) Oral at bedtime  clopidogrel Tablet 75 milliGRAM(s) Oral daily  dextrose 5%. 1000 milliLiter(s) (50 mL/Hr) IV Continuous <Continuous>  dextrose 50% Injectable 12.5 Gram(s) IV Push once  dextrose 50% Injectable 25 Gram(s) IV Push once  dextrose 50% Injectable 25 Gram(s) IV Push once  DULoxetine 60 milliGRAM(s) Oral daily  furosemide    Tablet 20 milliGRAM(s) Oral <User Schedule>  heparin  Injectable 5000 Unit(s) SubCutaneous every 12 hours  hydrALAZINE 25 milliGRAM(s) Oral three times a day  insulin glargine Injectable (LANTUS) 6 Unit(s) SubCutaneous at bedtime  insulin lispro (HumaLOG) corrective regimen sliding scale   SubCutaneous three times a day before meals  insulin lispro (HumaLOG) corrective regimen sliding scale   SubCutaneous at bedtime  insulin lispro Injectable (HumaLOG) 3 Unit(s) SubCutaneous before breakfast  insulin lispro Injectable (HumaLOG) 3 Unit(s) SubCutaneous before lunch  insulin lispro Injectable (HumaLOG) 3 Unit(s) SubCutaneous before dinner  lisinopril 40 milliGRAM(s) Oral daily  metoprolol succinate ER 50 milliGRAM(s) Oral daily  multivitamin 1 Tablet(s) Oral daily  sevelamer carbonate 1600 milliGRAM(s) Oral three times a day with meals      Assessment:  -leg pain  -hyperkalemia  -recurrent DKA  -volume overload  -ischemic cardiomyopathy, cad s/p multiple stents  -esrd on hd  -PAD s/p prior intervention   -      Recs:  -leg pain, hx of PAD. sx not consistent with CLI. most likely neuropathic - consider trial of gabapentin. no obvious infection. possibly related to edema 2/2 volume overload. c/w HD. further management of PAD as otupatient  -volume overload --> c/w HD to maintain euvolemia  -hyperglycemia --> f/u endo  -ischemic cardiomyopathy s/p LHC with Dr Vela  --> severe and diffuse instent restenosis along RCA --> unable to stent due to multiple layers of stenting and prior brachytherapy. we can consider complex intervention if true ischemic sx develop. c/w medical treatment with anti-platelet, statins and anti-anginals for now.  c/w metop succinate 50mg daily for anti-anginal effect and for pAT/NSVT  -c/w beta blockers for nsvt/pat/cad (as above)  -hx of prolonged qtc. avoid qtc prolonging meds  -s/p recent TTE: mod-severe MR, pseudo AS (low flow-low gradient), mod-severe LV dysfunction --> recent CTS consult with dr hebert appreciated. plan is for medical management given surgical risk and patient preference  -c/w asa, plavix, statin for ischemic cardiomyopathy/CAD/PAD  -dvt ppx        Over 25 minutes spent on total encounter; more than 50% of the visit was spent counseling and/or coordinating care by the attending physician.      Julián Biswas MD   Cardiovascular Disease  [phone]

## 2019-05-18 NOTE — PROGRESS NOTE ADULT - SUBJECTIVE AND OBJECTIVE BOX
Diabetes Follow up note:  Interval Hx:  62 yo woman with uncontrolled type 1 diabetes (well known from prior admissions) w/ESRD on HD, neuropathy, CAD, CVA, CHF and multiple cardiac stents presents w/LE edema and SOB. BG values improved past 2 days on present insulin regimen. Pt seen in HD unit. Reports feeling better and states "I'm going home today". Lunch at bedside but untouched-pt reports eating sheila crackers as the tuna salad tasted sour and the bread was stale. Denies any hypoglycemia symptoms.     Review of Systems:  General: as above. no complaints.   GI: Tolerating POs without any N/V/D/ABD PAIN.  CV: No CP/SOB  ENDO: No S&Sx of hypoglycemia  MEDS:  atorvastatin 40 milliGRAM(s) Oral at bedtime    insulin glargine Injectable (LANTUS) 6 Unit(s) SubCutaneous at bedtime  insulin lispro (HumaLOG) corrective regimen sliding scale   SubCutaneous three times a day before meals  insulin lispro (HumaLOG) corrective regimen sliding scale   SubCutaneous at bedtime  insulin lispro Injectable (HumaLOG) 3 Unit(s) SubCutaneous before breakfast  insulin lispro Injectable (HumaLOG) 3 Unit(s) SubCutaneous before lunch  insulin lispro Injectable (HumaLOG) 3 Unit(s) SubCutaneous before dinner  insulin lispro Injectable (HumaLOG) 2 Unit(s) SubCutaneous at bedtime PRN      Allergies    No Known Allergies        PE:  General: Female lying in bed. On HD.   Vital Signs Last 24 Hrs  T(C): 37.1 (18 May 2019 13:15), Max: 37.1 (18 May 2019 11:47)  T(F): 98.7 (18 May 2019 13:15), Max: 98.8 (18 May 2019 11:47)  HR: 94 (18 May 2019 13:15) (70 - 102)  BP: 159/61 (18 May 2019 13:15) (133/70 - 159/61)  BP(mean): --  RR: 18 (18 May 2019 13:15) (17 - 20)  SpO2: 92% (18 May 2019 13:15) (92% - 98%)  Abd: Soft, NT,ND,   Extremities: Warm. L AV fistula in place on HD machine. LE edema  Neuro: A&O X3    LABS:    POCT Blood Glucose.: 144 mg/dL (05-18-19 @ 11:16)  POCT Blood Glucose.: 135 mg/dL (05-18-19 @ 07:32)  POCT Blood Glucose.: 106 mg/dL (05-18-19 @ 00:15)  POCT Blood Glucose.: 85 mg/dL (05-17-19 @ 22:14)  POCT Blood Glucose.: 107 mg/dL (05-17-19 @ 18:32)  POCT Blood Glucose.: 143 mg/dL (05-17-19 @ 11:45)  POCT Blood Glucose.: 183 mg/dL (05-17-19 @ 07:47)  POCT Blood Glucose.: 326 mg/dL (05-16-19 @ 22:12)  POCT Blood Glucose.: 278 mg/dL (05-16-19 @ 21:01)  POCT Blood Glucose.: 142 mg/dL (05-16-19 @ 17:31)  POCT Blood Glucose.: 469 mg/dL (05-16-19 @ 11:27)                            9.3    4.1   )-----------( 207      ( 18 May 2019 05:21 )             28.7       05-18    141  |  98  |  16  ----------------------------<  145<H>  4.5   |  30  |  2.84<H>    Ca    9.4      18 May 2019 05:21      Hemoglobin A1C, Whole Blood: 8.8 % <H> [4.0 - 5.6] (04-30-19 @ 08:58)  Hemoglobin A1C, Whole Blood: 7.9 % <H> [4.0 - 5.6] (02-27-19 @ 11:08)            Contact number: isabel [phone] or [phone]

## 2019-05-18 NOTE — PROGRESS NOTE ADULT - SUBJECTIVE AND OBJECTIVE BOX
Patient is a 63y old  Female who presents with a chief complaint of edema, legs pain (18 May 2019 14:13)      SUBJECTIVE / OVERNIGHT EVENTS: Comfortable without new complaints.   Review of Systems  chest pain no  palpitations no  sob no  nausea no  headache no    MEDICATIONS  (STANDING):  aspirin enteric coated 81 milliGRAM(s) Oral daily  atorvastatin 40 milliGRAM(s) Oral at bedtime  clopidogrel Tablet 75 milliGRAM(s) Oral daily  dextrose 5%. 1000 milliLiter(s) (50 mL/Hr) IV Continuous <Continuous>  dextrose 50% Injectable 12.5 Gram(s) IV Push once  dextrose 50% Injectable 25 Gram(s) IV Push once  dextrose 50% Injectable 25 Gram(s) IV Push once  DULoxetine 60 milliGRAM(s) Oral daily  furosemide    Tablet 20 milliGRAM(s) Oral <User Schedule>  heparin  Injectable 5000 Unit(s) SubCutaneous every 12 hours  hydrALAZINE 25 milliGRAM(s) Oral three times a day  insulin glargine Injectable (LANTUS) 6 Unit(s) SubCutaneous at bedtime  insulin lispro (HumaLOG) corrective regimen sliding scale   SubCutaneous three times a day before meals  insulin lispro (HumaLOG) corrective regimen sliding scale   SubCutaneous at bedtime  insulin lispro Injectable (HumaLOG) 3 Unit(s) SubCutaneous before breakfast  insulin lispro Injectable (HumaLOG) 3 Unit(s) SubCutaneous before lunch  insulin lispro Injectable (HumaLOG) 3 Unit(s) SubCutaneous before dinner  lisinopril 40 milliGRAM(s) Oral daily  metoprolol succinate ER 50 milliGRAM(s) Oral daily  multivitamin 1 Tablet(s) Oral daily  sevelamer carbonate 1600 milliGRAM(s) Oral three times a day with meals    MEDICATIONS  (PRN):  acetaminophen   Tablet .. 650 milliGRAM(s) Oral every 6 hours PRN Temp greater or equal to 38.5C (101.3F), Mild Pain (1 - 3)  dextrose 40% Gel 15 Gram(s) Oral once PRN Blood Glucose LESS THAN 70 milliGRAM(s)/deciliter  glucagon  Injectable 1 milliGRAM(s) IntraMuscular once PRN Glucose LESS THAN 70 milligrams/deciliter  insulin lispro Injectable (HumaLOG) 2 Unit(s) SubCutaneous at bedtime PRN snack  oxyCODONE    5 mG/acetaminophen 325 mG 1 Tablet(s) Oral every 6 hours PRN Moderate Pain (4 - 6)      Vital Signs Last 24 Hrs  T(C): 37.1 (18 May 2019 13:15), Max: 37.1 (18 May 2019 11:47)  T(F): 98.7 (18 May 2019 13:15), Max: 98.8 (18 May 2019 11:47)  HR: 94 (18 May 2019 13:15) (70 - 102)  BP: 159/61 (18 May 2019 13:15) (133/70 - 159/61)  BP(mean): --  RR: 18 (18 May 2019 13:15) (17 - 20)  SpO2: 92% (18 May 2019 13:15) (92% - 98%)    PHYSICAL EXAM:  GENERAL: NAD  HEAD:  Atraumatic, Normocephalic  EYES: EOMI, PERRLA, conjunctiva and sclera clear  NECK: Supple, No JVD  CHEST/LUNG: Clear to auscultation bilaterally; No wheeze  HEART: Regular rate and rhythm; No murmurs, rubs, or gallops  ABDOMEN: Soft, Nontender, Nondistended; Bowel sounds present  EXTREMITIES:  2+bipedal edema  PSYCH: AAOx3  NEUROLOGY: non-focal  SKIN: No rashes or lesions    LABS:                        9.3    4.1   )-----------( 207      ( 18 May 2019 05:21 )             28.7     05-18    141  |  98  |  16  ----------------------------<  145<H>  4.5   |  30  |  2.84<H>    Ca    9.4      18 May 2019 05:21                  RADIOLOGY & ADDITIONAL TESTS:    Imaging Personally Reviewed:    Consultant(s) Notes Reviewed:      Care Discussed with Consultants/Other Providers:

## 2019-05-18 NOTE — PROGRESS NOTE ADULT - PROBLEM SELECTOR PLAN 1
-test BG AC/HS  -c/w Lantus 6 units QHS  -c/w Humalog 3 units AC meals. If BG less than 100mg/dl and pt eating, can reduce to 2 units w/meals  -c/w Humalog low correction scale AC and Low HS scale  -Can be discharged on basal/bolus  -f/u outpt w/private sunday Ley  -discussed w/pt and NP  pager: [phone]

## 2019-05-18 NOTE — PROGRESS NOTE ADULT - SUBJECTIVE AND OBJECTIVE BOX
[facility]   [phone]  DIALYSIS NOTE  Chief Complaint: ESRD/Ongoing hemodialysis requirement.    24 hour events/subjective:    c/o fatigue           ALLERGIES & MEDICATIONS  --------------------------------------------------------------------------------  Allergies    No Known Allergies    Intolerances      Standing Inpatient Medications  aspirin enteric coated 81 milliGRAM(s) Oral daily  atorvastatin 40 milliGRAM(s) Oral at bedtime  clopidogrel Tablet 75 milliGRAM(s) Oral daily  dextrose 5%. 1000 milliLiter(s) IV Continuous <Continuous>  dextrose 50% Injectable 12.5 Gram(s) IV Push once  dextrose 50% Injectable 25 Gram(s) IV Push once  dextrose 50% Injectable 25 Gram(s) IV Push once  DULoxetine 60 milliGRAM(s) Oral daily  epoetin azalea Injectable 21701 Unit(s) IV Push once  furosemide    Tablet 20 milliGRAM(s) Oral <User Schedule>  heparin  Injectable 5000 Unit(s) SubCutaneous every 12 hours  hydrALAZINE 25 milliGRAM(s) Oral three times a day  insulin glargine Injectable (LANTUS) 6 Unit(s) SubCutaneous at bedtime  insulin lispro (HumaLOG) corrective regimen sliding scale   SubCutaneous three times a day before meals  insulin lispro (HumaLOG) corrective regimen sliding scale   SubCutaneous at bedtime  insulin lispro Injectable (HumaLOG) 3 Unit(s) SubCutaneous before breakfast  insulin lispro Injectable (HumaLOG) 3 Unit(s) SubCutaneous before lunch  insulin lispro Injectable (HumaLOG) 3 Unit(s) SubCutaneous before dinner  lisinopril 40 milliGRAM(s) Oral daily  metoprolol succinate ER 50 milliGRAM(s) Oral daily  multivitamin 1 Tablet(s) Oral daily  sevelamer carbonate 1600 milliGRAM(s) Oral three times a day with meals    PRN Inpatient Medications  acetaminophen   Tablet .. 650 milliGRAM(s) Oral every 6 hours PRN  dextrose 40% Gel 15 Gram(s) Oral once PRN  glucagon  Injectable 1 milliGRAM(s) IntraMuscular once PRN  insulin lispro Injectable (HumaLOG) 2 Unit(s) SubCutaneous at bedtime PRN  oxyCODONE    5 mG/acetaminophen 325 mG 1 Tablet(s) Oral every 6 hours PRN      REVIEW OF SYSTEMS  --------------------------------------------------------------------------------  no itching or rash  no fever or chill  no cp or palp   no sob or cough   no N/V/D/ no abd pain   ext no edema         VITALS/PHYSICAL EXAM  --------------------------------------------------------------------------------  T(C): 37.1 (05-18-19 @ 13:15), Max: 37.1 (05-18-19 @ 11:47)  HR: 94 (05-18-19 @ 13:15) (70 - 102)  BP: 159/61 (05-18-19 @ 13:15) (133/70 - 159/61)  RR: 18 (05-18-19 @ 13:15) (17 - 20)  SpO2: 92% (05-18-19 @ 13:15) (92% - 98%)  Wt(kg): --  Height (cm): 160.02 (05-16-19 @ 17:19)  Weight (kg): 54.4 (05-16-19 @ 17:19)  BMI (kg/m2): 21.2 (05-16-19 @ 17:19)  BSA (m2): 1.56 (05-16-19 @ 17:19)      05-17-19 @ 07:01  -  05-18-19 @ 07:00  --------------------------------------------------------  IN: 1500 mL / OUT: 2900 mL / NET: -1400 mL    05-18-19 @ 07:01  -  05-18-19 @ 14:00  --------------------------------------------------------  IN: 480 mL / OUT: 0 mL / NET: 480 mL      Physical Exam:  		  Gen: Non toxic comfortable appearing   	no jvd , supple neck,   	Pulm: decrease bs  no rales or ronchi or wheezing  	CV: RRR, S1S2; no rub  	Abd: +BS, soft, nontender/nondistended  	: No suprapubic tenderness  	UE: Warm, no cyanosis  no clubbing,  no edema;   	LE: Warm, no cyanosis  no clubbing, 2+ LLE  edema  	Neuro: alert and oriented. speech coherent   	    	    LABS/STUDIES  --------------------------------------------------------------------------------              9.3    4.1   >-----------<  207      [05-18-19 @ 05:21]              28.7     141  |  98  |  16  ----------------------------<  145      [05-18-19 @ 05:21]  4.5   |  30  |  2.84        Ca     9.4     [05-18-19 @ 05:21]                    imp/suggest: ESRD      Hemodialysis Prescription:  	Access:  	Dialyzer: revaclear   	Blood Flow (mL/Min): 400  	Dialysate Flow (mL/Min): 600  	Target UF (Liters):  	Treatment Time:  	Potassium:   	Calcium: 2.5  	  YOLANDA    Vitamin D     continue with hd   see hd flow sheet

## 2019-05-19 ENCOUNTER — TRANSCRIPTION ENCOUNTER (OUTPATIENT)
Age: 64
End: 2019-05-19

## 2019-05-19 LAB
ANION GAP SERPL CALC-SCNC: 12 MMOL/L — SIGNIFICANT CHANGE UP (ref 5–17)
BUN SERPL-MCNC: 15 MG/DL — SIGNIFICANT CHANGE UP (ref 7–23)
CALCIUM SERPL-MCNC: 9.2 MG/DL — SIGNIFICANT CHANGE UP (ref 8.4–10.5)
CHLORIDE SERPL-SCNC: 93 MMOL/L — LOW (ref 96–108)
CO2 SERPL-SCNC: 28 MMOL/L — SIGNIFICANT CHANGE UP (ref 22–31)
CREAT SERPL-MCNC: 2.63 MG/DL — HIGH (ref 0.5–1.3)
GLUCOSE BLDC GLUCOMTR-MCNC: 124 MG/DL — HIGH (ref 70–99)
GLUCOSE BLDC GLUCOMTR-MCNC: 185 MG/DL — HIGH (ref 70–99)
GLUCOSE BLDC GLUCOMTR-MCNC: 202 MG/DL — HIGH (ref 70–99)
GLUCOSE BLDC GLUCOMTR-MCNC: 339 MG/DL — HIGH (ref 70–99)
GLUCOSE SERPL-MCNC: 374 MG/DL — HIGH (ref 70–99)
POTASSIUM SERPL-MCNC: 4.2 MMOL/L — SIGNIFICANT CHANGE UP (ref 3.5–5.3)
POTASSIUM SERPL-SCNC: 4.2 MMOL/L — SIGNIFICANT CHANGE UP (ref 3.5–5.3)
SODIUM SERPL-SCNC: 133 MMOL/L — LOW (ref 135–145)

## 2019-05-19 PROCEDURE — 99232 SBSQ HOSP IP/OBS MODERATE 35: CPT

## 2019-05-19 RX ORDER — SEVELAMER CARBONATE 2400 MG/1
1 POWDER, FOR SUSPENSION ORAL
Qty: 0 | Refills: 0 | DISCHARGE
Start: 2019-05-19

## 2019-05-19 RX ORDER — DULOXETINE HYDROCHLORIDE 30 MG/1
1 CAPSULE, DELAYED RELEASE ORAL
Qty: 0 | Refills: 0 | DISCHARGE
Start: 2019-05-19

## 2019-05-19 RX ORDER — ACETAMINOPHEN 500 MG
2 TABLET ORAL
Qty: 0 | Refills: 0 | DISCHARGE
Start: 2019-05-19

## 2019-05-19 RX ORDER — SEVELAMER CARBONATE 2400 MG/1
2 POWDER, FOR SUSPENSION ORAL
Qty: 0 | Refills: 0 | DISCHARGE
Start: 2019-05-19

## 2019-05-19 RX ORDER — INSULIN LISPRO 100/ML
3 VIAL (ML) SUBCUTANEOUS
Refills: 0 | Status: DISCONTINUED | OUTPATIENT
Start: 2019-05-19 | End: 2019-05-20

## 2019-05-19 RX ADMIN — Medication 25 MILLIGRAM(S): at 17:07

## 2019-05-19 RX ADMIN — SEVELAMER CARBONATE 1600 MILLIGRAM(S): 2400 POWDER, FOR SUSPENSION ORAL at 08:21

## 2019-05-19 RX ADMIN — CLOPIDOGREL BISULFATE 75 MILLIGRAM(S): 75 TABLET, FILM COATED ORAL at 12:20

## 2019-05-19 RX ADMIN — Medication 4: at 08:20

## 2019-05-19 RX ADMIN — SEVELAMER CARBONATE 1600 MILLIGRAM(S): 2400 POWDER, FOR SUSPENSION ORAL at 12:20

## 2019-05-19 RX ADMIN — Medication 1: at 12:19

## 2019-05-19 RX ADMIN — OXYCODONE AND ACETAMINOPHEN 1 TABLET(S): 5; 325 TABLET ORAL at 05:26

## 2019-05-19 RX ADMIN — OXYCODONE AND ACETAMINOPHEN 1 TABLET(S): 5; 325 TABLET ORAL at 18:06

## 2019-05-19 RX ADMIN — LISINOPRIL 40 MILLIGRAM(S): 2.5 TABLET ORAL at 05:27

## 2019-05-19 RX ADMIN — Medication 50 MILLIGRAM(S): at 05:27

## 2019-05-19 RX ADMIN — INSULIN GLARGINE 6 UNIT(S): 100 INJECTION, SOLUTION SUBCUTANEOUS at 21:39

## 2019-05-19 RX ADMIN — SEVELAMER CARBONATE 1600 MILLIGRAM(S): 2400 POWDER, FOR SUSPENSION ORAL at 17:07

## 2019-05-19 RX ADMIN — Medication 3 UNIT(S): at 17:06

## 2019-05-19 RX ADMIN — Medication 1 TABLET(S): at 12:20

## 2019-05-19 RX ADMIN — Medication 2: at 17:06

## 2019-05-19 RX ADMIN — OXYCODONE AND ACETAMINOPHEN 1 TABLET(S): 5; 325 TABLET ORAL at 07:00

## 2019-05-19 RX ADMIN — ATORVASTATIN CALCIUM 40 MILLIGRAM(S): 80 TABLET, FILM COATED ORAL at 21:39

## 2019-05-19 RX ADMIN — DULOXETINE HYDROCHLORIDE 60 MILLIGRAM(S): 30 CAPSULE, DELAYED RELEASE ORAL at 12:20

## 2019-05-19 RX ADMIN — Medication 81 MILLIGRAM(S): at 12:20

## 2019-05-19 RX ADMIN — Medication 3 UNIT(S): at 08:21

## 2019-05-19 RX ADMIN — Medication 3 UNIT(S): at 12:19

## 2019-05-19 RX ADMIN — Medication 25 MILLIGRAM(S): at 05:27

## 2019-05-19 NOTE — PROGRESS NOTE ADULT - SUBJECTIVE AND OBJECTIVE BOX
Diabetes Follow up note:  Interval Hx:  62 yo woman with uncontrolled type 1 diabetes (well known from prior admissions) w/ESRD on HD, neuropathy, CAD, CVA, CHF and multiple cardiac stents presents w/LE edema and SOB. Pt had pre-dinner glucose of 84mg/dl after HD yesterday-premeal insulin held w/rebound hyperglycemia to >200 last night and >300 mg/dl this AM. Pt seen at bedside. Asking what time she will be discharged today. Reported eating dinner last night and breakfast this AM.     Review of Systems:  General: as above.   GI: Tolerating POs without any N/V/D/ABD PAIN.  CV: No CP/SOB  ENDO: No S&Sx of hypoglycemia  MEDS:  atorvastatin 40 milliGRAM(s) Oral at bedtime    insulin glargine Injectable (LANTUS) 6 Unit(s) SubCutaneous at bedtime  insulin lispro (HumaLOG) corrective regimen sliding scale   SubCutaneous three times a day before meals  insulin lispro (HumaLOG) corrective regimen sliding scale   SubCutaneous at bedtime  insulin lispro Injectable (HumaLOG) 3 Unit(s) SubCutaneous before breakfast  insulin lispro Injectable (HumaLOG) 3 Unit(s) SubCutaneous before lunch  insulin lispro Injectable (HumaLOG) 3 Unit(s) SubCutaneous before dinner  insulin lispro Injectable (HumaLOG) 2 Unit(s) SubCutaneous at bedtime PRN      Allergies    No Known Allergies      PE:  General: Female lying in bed. NAD.   Vital Signs Last 24 Hrs  T(C): 36.6 (19 May 2019 04:34), Max: 37.1 (18 May 2019 11:47)  T(F): 97.8 (19 May 2019 04:34), Max: 98.8 (18 May 2019 11:47)  HR: 71 (19 May 2019 05:22) (70 - 102)  BP: 168/89 (19 May 2019 05:22) (143/76 - 168/89)  BP(mean): --  RR: 18 (19 May 2019 05:22) (17 - 18)  SpO2: 94% (19 May 2019 05:22) (92% - 100%)  Abd: Soft, NT,ND,   Extremities: Warm. B/L LE edema  Neuro: A&O X3    LABS:    POCT Blood Glucose.: 339 mg/dL (05-19-19 @ 08:00)  POCT Blood Glucose.: 232 mg/dL (05-18-19 @ 21:16)  POCT Blood Glucose.: 84 mg/dL (05-18-19 @ 16:57)  POCT Blood Glucose.: 144 mg/dL (05-18-19 @ 11:16)  POCT Blood Glucose.: 135 mg/dL (05-18-19 @ 07:32)  POCT Blood Glucose.: 106 mg/dL (05-18-19 @ 00:15)  POCT Blood Glucose.: 85 mg/dL (05-17-19 @ 22:14)  POCT Blood Glucose.: 107 mg/dL (05-17-19 @ 18:32)  POCT Blood Glucose.: 143 mg/dL (05-17-19 @ 11:45)  POCT Blood Glucose.: 183 mg/dL (05-17-19 @ 07:47)  POCT Blood Glucose.: 326 mg/dL (05-16-19 @ 22:12)  POCT Blood Glucose.: 278 mg/dL (05-16-19 @ 21:01)  POCT Blood Glucose.: 142 mg/dL (05-16-19 @ 17:31)  POCT Blood Glucose.: 469 mg/dL (05-16-19 @ 11:27)                            9.3    4.1   )-----------( 207      ( 18 May 2019 05:21 )             28.7       05-19    133<L>  |  93<L>  |  15  ----------------------------<  374<H>  4.2   |  28  |  2.63<H>    Ca    9.2      19 May 2019 06:18          Hemoglobin A1C, Whole Blood: 8.8 % <H> [4.0 - 5.6] (04-30-19 @ 08:58)  Hemoglobin A1C, Whole Blood: 7.9 % <H> [4.0 - 5.6] (02-27-19 @ 11:08)            Contact number: isabel [phone] or [phone]

## 2019-05-19 NOTE — PROGRESS NOTE ADULT - SUBJECTIVE AND OBJECTIVE BOX
Patient is a 63y old  Female who presents with a chief complaint of edema, legs pain (19 May 2019 09:49)      SUBJECTIVE / OVERNIGHT EVENTS: Comfortable without new complaints. Wants to go home.  Review of Systems  chest pain no  palpitations no  sob no  nausea no  headache no    MEDICATIONS  (STANDING):  aspirin enteric coated 81 milliGRAM(s) Oral daily  atorvastatin 40 milliGRAM(s) Oral at bedtime  clopidogrel Tablet 75 milliGRAM(s) Oral daily  dextrose 5%. 1000 milliLiter(s) (50 mL/Hr) IV Continuous <Continuous>  dextrose 50% Injectable 12.5 Gram(s) IV Push once  dextrose 50% Injectable 25 Gram(s) IV Push once  dextrose 50% Injectable 25 Gram(s) IV Push once  DULoxetine 60 milliGRAM(s) Oral daily  furosemide    Tablet 20 milliGRAM(s) Oral <User Schedule>  heparin  Injectable 5000 Unit(s) SubCutaneous every 12 hours  hydrALAZINE 25 milliGRAM(s) Oral three times a day  insulin glargine Injectable (LANTUS) 6 Unit(s) SubCutaneous at bedtime  insulin lispro (HumaLOG) corrective regimen sliding scale   SubCutaneous three times a day before meals  insulin lispro (HumaLOG) corrective regimen sliding scale   SubCutaneous at bedtime  insulin lispro Injectable (HumaLOG) 3 Unit(s) SubCutaneous three times a day before meals  lisinopril 40 milliGRAM(s) Oral daily  metoprolol succinate ER 50 milliGRAM(s) Oral daily  multivitamin 1 Tablet(s) Oral daily  sevelamer carbonate 1600 milliGRAM(s) Oral three times a day with meals    MEDICATIONS  (PRN):  acetaminophen   Tablet .. 650 milliGRAM(s) Oral every 6 hours PRN Temp greater or equal to 38.5C (101.3F), Mild Pain (1 - 3)  dextrose 40% Gel 15 Gram(s) Oral once PRN Blood Glucose LESS THAN 70 milliGRAM(s)/deciliter  glucagon  Injectable 1 milliGRAM(s) IntraMuscular once PRN Glucose LESS THAN 70 milligrams/deciliter  insulin lispro Injectable (HumaLOG) 2 Unit(s) SubCutaneous at bedtime PRN snack  oxyCODONE    5 mG/acetaminophen 325 mG 1 Tablet(s) Oral every 6 hours PRN Moderate Pain (4 - 6)      Vital Signs Last 24 Hrs  T(C): 36.6 (19 May 2019 04:34), Max: 37.1 (18 May 2019 11:47)  T(F): 97.8 (19 May 2019 04:34), Max: 98.8 (18 May 2019 11:47)  HR: 71 (19 May 2019 05:22) (70 - 102)  BP: 168/89 (19 May 2019 05:22) (143/76 - 168/89)  BP(mean): --  RR: 18 (19 May 2019 05:22) (17 - 18)  SpO2: 94% (19 May 2019 05:22) (92% - 100%)    PHYSICAL EXAM:  GENERAL: NAD, well-developed  HEAD:  Atraumatic, Normocephalic  EYES: EOMI, PERRLA, conjunctiva and sclera clear  NECK: Supple, No JVD  CHEST/LUNG: Clear to auscultation bilaterally; No wheeze  HEART: Regular rate and rhythm; No murmurs, rubs, or gallops  ABDOMEN: Soft, Nontender, Nondistended; Bowel sounds present  EXTREMITIES:  2+ Peripheral Pulses, No clubbing, cyanosis, or edema  PSYCH: AAOx3  NEUROLOGY: non-focal  SKIN: No rashes or lesions    LABS:                        9.3    4.1   )-----------( 207      ( 18 May 2019 05:21 )             28.7     05-19    133<L>  |  93<L>  |  15  ----------------------------<  374<H>  4.2   |  28  |  2.63<H>    Ca    9.2      19 May 2019 06:18                  RADIOLOGY & ADDITIONAL TESTS:    Imaging Personally Reviewed:    Consultant(s) Notes Reviewed:      Care Discussed with Consultants/Other Providers:

## 2019-05-19 NOTE — DISCHARGE NOTE PROVIDER - CARE PROVIDER_API CALL
Lance Elizalde)  Surgery; Vascular Surgery  [address], [address]  [place] [address]  Phone: [phone]  Fax: [phone]  Follow Up Time:

## 2019-05-19 NOTE — DISCHARGE NOTE PROVIDER - NSDCCPCAREPLAN_GEN_ALL_CORE_FT
PRINCIPAL DISCHARGE DIAGNOSIS  Diagnosis: Hyperkalemia  Assessment and Plan of Treatment:       SECONDARY DISCHARGE DIAGNOSES  Diagnosis: DKA, type 1  Assessment and Plan of Treatment: stable.  continue with home diabetic regimen.    Diagnosis: CHF (congestive heart failure)  Assessment and Plan of Treatment: Weigh yourself daily.  If you gain 3lbs in 3 days, or 5lbs in a week call your Health Care Provider.  Do not eat or drink foods containing more than 2000mg of salt (sodium) in your diet every day.  Call your Health Care Provider if you have any swelling or increased swelling in your feet, ankles, and/or stomach.  Take all of your medication as directed.  If you become dizzy call your Health Care Provider.      Diagnosis: History of COPD  Assessment and Plan of Treatment: Call your Health Care provider upon arrival home to make a follow up appointment within one week.  Take all inhalers as prescribed by your Health Care Provider.  Take steroids as prescribed by your Health Care Provider.  If your cough increases infrequency and severity and/or you have shortness of breath or increased shortness of breath call your Health Care Provider.  If you develop fever, chills, night sweats, malaise, and/or change in mental status call your Health care Provider.  Nutrition is very important.  Eat small frequent meals.  Increase your activity as tolerated.  Do not stay in bed all day      Diagnosis: CVA (cerebrovascular accident)  Assessment and Plan of Treatment: supportive care.  maintain fall and safety precaution.       Diagnosis: Depression  Assessment and Plan of Treatment: continue with cymbalta.    Diagnosis: Hyperlipidemia  Assessment and Plan of Treatment: Follow up with PCP for treatment goals, continue medication, have liver function testing every 3 months as anti lipid medications can cause liver irritation, eat low fat, low cholesterol meals      Diagnosis: Peripheral vascular disease  Assessment and Plan of Treatment: ***** s/p us duplex hemodialysis left arm access shown*****

## 2019-05-19 NOTE — PROGRESS NOTE ADULT - PROBLEM SELECTOR PLAN 1
-test BG AC/HS  -c/w Lantus 6 units QHS  -c/w Humalog 3 units AC meals. (if BG less than 100mg/dl-can reduce dose to 2 units x 1 for that meal-get one time order).   -c/w Humalog low correction scale AC and Low HS scale  Can be discharged on basal/bolus (home regimen)  -f/u outpt w/private sunday Ley  -discussed w/pt and NP  pager: [phone].

## 2019-05-19 NOTE — DISCHARGE NOTE PROVIDER - HOSPITAL COURSE
64 y/o Female PMH CAD Sp PTCA w stents CHF,COPD, CVA, DMT1, ESRD on HD (M,Tu,Th,Sa), Gout, HLD, PVD, Sublcavian Stensosis (L) sp stent. PSH ASD Repair,idania,fem-pop bypass. Last dc from hospt approx 2wk ago now comes to ED via ems complains of lower extremity swelling bilaterally with pain.             ESRD    - HD    - Nephrology follow Dr. Schmidt     - AVF duplex result pending     - Vascular evaluation Dr. Tanner         DKA/ Diabetes type 1 control resolving    - endocrine follow        CAD    - stable    - cardiology evaluation Dr. Biswas        CHF (congestive heart failure) EF 40-45%    - cardiology evaluation        COPD (chronic obstructive pulmonary disease)     - nebs        CVA (cerebral infarction)    - supportive care         Depression    - continue Rx        Gout    - continue Rx        Hyperlipidemia     - continue Rx        Peripheral vascular disease occluded left fem-pop bypass 5/2015    - stable 62 y/o Female PMH CAD Sp PTCA w stents CHF,COPD, CVA, DMT1, ESRD on HD (M,Tu,Th,Sa), Gout, HLD, PVD, Sublcavian Stensosis (L) sp stent. PSH ASD Repair,idania,fem-pop bypass. Last dc from hospt approx 2wk ago now comes to ED via ems complains of lower extremity swelling bilaterally with pain.  ESRD on HD, Nephrology follow Dr. Schmidt. US of HD access showed patent AVF but flow-limiting stenosis of the egress vein in the proximal upper arm;  partially thrombosed stent in the egress cephalic vein central to the stenosis in the proximal upper arm. ** vascular\    hx of DMT1, pt had DKA, seen by endocrine. current A1C 8.8. c/w home insulin.     pt is stable to d/c. 64 y/o Female PMH CAD Sp PTCA w stents CHF,COPD, CVA, DMT1, ESRD on HD (M,Tu,Th,Sa), Gout, HLD, PVD, Sublcavian Stensosis (L) sp stent. PSH ASD Repair,idania,fem-pop bypass. Last dc from hospt approx 2wk ago now comes to ED via ems complains of lower extremity swelling bilaterally with pain.  ESRD on HD, Nephrology follow Dr. Schmidt. US of HD access showed patent AVF but flow-limiting stenosis of the egress vein in the proximal upper arm;  partially thrombosed stent in the egress cephalic vein central to the stenosis in the proximal upper arm. ** vascular    hx of DMT1, pt had DKA, seen by endocrine. current A1C 8.8. c/w home insulin.     pt is stable to d/c.

## 2019-05-20 DIAGNOSIS — E11.649 TYPE 2 DIABETES MELLITUS WITH HYPOGLYCEMIA WITHOUT COMA: ICD-10-CM

## 2019-05-20 LAB
ANION GAP SERPL CALC-SCNC: 14 MMOL/L — SIGNIFICANT CHANGE UP (ref 5–17)
BUN SERPL-MCNC: 21 MG/DL — SIGNIFICANT CHANGE UP (ref 7–23)
CALCIUM SERPL-MCNC: 9.5 MG/DL — SIGNIFICANT CHANGE UP (ref 8.4–10.5)
CHLORIDE SERPL-SCNC: 91 MMOL/L — LOW (ref 96–108)
CO2 SERPL-SCNC: 27 MMOL/L — SIGNIFICANT CHANGE UP (ref 22–31)
CREAT SERPL-MCNC: 4.27 MG/DL — HIGH (ref 0.5–1.3)
GLUCOSE BLDC GLUCOMTR-MCNC: 105 MG/DL — HIGH (ref 70–99)
GLUCOSE BLDC GLUCOMTR-MCNC: 129 MG/DL — HIGH (ref 70–99)
GLUCOSE BLDC GLUCOMTR-MCNC: 134 MG/DL — HIGH (ref 70–99)
GLUCOSE BLDC GLUCOMTR-MCNC: 48 MG/DL — LOW (ref 70–99)
GLUCOSE BLDC GLUCOMTR-MCNC: 90 MG/DL — SIGNIFICANT CHANGE UP (ref 70–99)
GLUCOSE SERPL-MCNC: 125 MG/DL — HIGH (ref 70–99)
MAGNESIUM SERPL-MCNC: 2.2 MG/DL — SIGNIFICANT CHANGE UP (ref 1.6–2.6)
PHOSPHATE SERPL-MCNC: 4.5 MG/DL — SIGNIFICANT CHANGE UP (ref 2.5–4.5)
POTASSIUM SERPL-MCNC: 4.1 MMOL/L — SIGNIFICANT CHANGE UP (ref 3.5–5.3)
POTASSIUM SERPL-SCNC: 4.1 MMOL/L — SIGNIFICANT CHANGE UP (ref 3.5–5.3)
SODIUM SERPL-SCNC: 132 MMOL/L — LOW (ref 135–145)

## 2019-05-20 PROCEDURE — 99232 SBSQ HOSP IP/OBS MODERATE 35: CPT

## 2019-05-20 RX ORDER — INSULIN LISPRO 100/ML
3 VIAL (ML) SUBCUTANEOUS
Qty: 0 | Refills: 0 | DISCHARGE

## 2019-05-20 RX ORDER — SEVELAMER CARBONATE 2400 MG/1
2 POWDER, FOR SUSPENSION ORAL
Qty: 0 | Refills: 0 | DISCHARGE

## 2019-05-20 RX ORDER — FUROSEMIDE 40 MG
1 TABLET ORAL
Qty: 0 | Refills: 0 | DISCHARGE
Start: 2019-05-20

## 2019-05-20 RX ORDER — ENOXAPARIN SODIUM 100 MG/ML
6 INJECTION SUBCUTANEOUS
Qty: 1 | Refills: 0
Start: 2019-05-20 | End: 2019-06-18

## 2019-05-20 RX ORDER — INSULIN LISPRO 100/ML
2 VIAL (ML) SUBCUTANEOUS
Refills: 0 | Status: DISCONTINUED | OUTPATIENT
Start: 2019-05-20 | End: 2019-05-21

## 2019-05-20 RX ORDER — FUROSEMIDE 40 MG
1 TABLET ORAL
Qty: 0 | Refills: 0 | DISCHARGE

## 2019-05-20 RX ORDER — ATORVASTATIN CALCIUM 80 MG/1
1 TABLET, FILM COATED ORAL
Qty: 30 | Refills: 0
Start: 2019-05-20 | End: 2019-06-18

## 2019-05-20 RX ORDER — DULOXETINE HYDROCHLORIDE 30 MG/1
1 CAPSULE, DELAYED RELEASE ORAL
Qty: 0 | Refills: 0 | DISCHARGE

## 2019-05-20 RX ORDER — INSULIN DETEMIR 100/ML (3)
6 INSULIN PEN (ML) SUBCUTANEOUS AT BEDTIME
Refills: 0 | Status: DISCONTINUED | OUTPATIENT
Start: 2019-05-20 | End: 2019-05-21

## 2019-05-20 RX ADMIN — Medication 50 MILLIGRAM(S): at 05:21

## 2019-05-20 RX ADMIN — Medication 25 MILLIGRAM(S): at 21:43

## 2019-05-20 RX ADMIN — Medication 25 MILLIGRAM(S): at 00:22

## 2019-05-20 RX ADMIN — Medication 20 MILLIGRAM(S): at 08:20

## 2019-05-20 RX ADMIN — SEVELAMER CARBONATE 1600 MILLIGRAM(S): 2400 POWDER, FOR SUSPENSION ORAL at 17:10

## 2019-05-20 RX ADMIN — SEVELAMER CARBONATE 1600 MILLIGRAM(S): 2400 POWDER, FOR SUSPENSION ORAL at 08:20

## 2019-05-20 RX ADMIN — CLOPIDOGREL BISULFATE 75 MILLIGRAM(S): 75 TABLET, FILM COATED ORAL at 12:00

## 2019-05-20 RX ADMIN — OXYCODONE AND ACETAMINOPHEN 1 TABLET(S): 5; 325 TABLET ORAL at 01:30

## 2019-05-20 RX ADMIN — Medication 3 UNIT(S): at 08:20

## 2019-05-20 RX ADMIN — Medication 6 UNIT(S): at 21:44

## 2019-05-20 RX ADMIN — OXYCODONE AND ACETAMINOPHEN 1 TABLET(S): 5; 325 TABLET ORAL at 00:22

## 2019-05-20 RX ADMIN — DULOXETINE HYDROCHLORIDE 60 MILLIGRAM(S): 30 CAPSULE, DELAYED RELEASE ORAL at 12:00

## 2019-05-20 RX ADMIN — Medication 25 MILLIGRAM(S): at 13:59

## 2019-05-20 RX ADMIN — LISINOPRIL 40 MILLIGRAM(S): 2.5 TABLET ORAL at 05:22

## 2019-05-20 RX ADMIN — Medication 81 MILLIGRAM(S): at 12:00

## 2019-05-20 RX ADMIN — SEVELAMER CARBONATE 1600 MILLIGRAM(S): 2400 POWDER, FOR SUSPENSION ORAL at 12:00

## 2019-05-20 RX ADMIN — Medication 3 UNIT(S): at 12:01

## 2019-05-20 RX ADMIN — ATORVASTATIN CALCIUM 40 MILLIGRAM(S): 80 TABLET, FILM COATED ORAL at 21:43

## 2019-05-20 RX ADMIN — Medication 1 TABLET(S): at 12:00

## 2019-05-20 RX ADMIN — Medication 25 MILLIGRAM(S): at 05:22

## 2019-05-20 NOTE — CONSULT NOTE ADULT - ASSESSMENT
ASSESSMENT: Patient is a 63F pmhx CAD c/b MI x8 & stents x7, CHF, COPD, CVA, DMT1, ESRD on HD (M,Tu,Th,Sa), Gout, HLD, PVD, Sublcavian Stensosis (L) sp stent who presented with lower extremity edema, and has now been found to have a LUE AV fistula stenosis & partially thrombosed subclavian stent.    PLAN:    - Will plan to add patient to cath lab schedule for this week, possibly wednesday    Discussed with Dr. Tonio Richmond, PGY-2  Vascular Surgery [phone]

## 2019-05-20 NOTE — PROVIDER CONTACT NOTE (OTHER) - RECOMMENDATIONS
Patient given 4 oz apple juice x 2 and blood glucose came up to 105. Blood glucose checked every 15 minutes x 2. Hypoglycemia protocol followed.

## 2019-05-20 NOTE — PROGRESS NOTE ADULT - SUBJECTIVE AND OBJECTIVE BOX
Patient is a 63y old  Female who presents with a chief complaint of edema, legs pain (20 May 2019 16:05)      SUBJECTIVE / OVERNIGHT EVENTS: No new complaints.   Review of Systems  chest pain no  palpitations no  sob no  nausea no  headache no    MEDICATIONS  (STANDING):  aspirin enteric coated 81 milliGRAM(s) Oral daily  atorvastatin 40 milliGRAM(s) Oral at bedtime  clopidogrel Tablet 75 milliGRAM(s) Oral daily  dextrose 5%. 1000 milliLiter(s) (50 mL/Hr) IV Continuous <Continuous>  dextrose 50% Injectable 12.5 Gram(s) IV Push once  dextrose 50% Injectable 25 Gram(s) IV Push once  dextrose 50% Injectable 25 Gram(s) IV Push once  DULoxetine 60 milliGRAM(s) Oral daily  furosemide    Tablet 20 milliGRAM(s) Oral <User Schedule>  heparin  Injectable 5000 Unit(s) SubCutaneous every 12 hours  hydrALAZINE 25 milliGRAM(s) Oral three times a day  insulin detemir injectable (LEVEMIR) 6 Unit(s) SubCutaneous at bedtime  insulin lispro (HumaLOG) corrective regimen sliding scale   SubCutaneous three times a day before meals  insulin lispro (HumaLOG) corrective regimen sliding scale   SubCutaneous at bedtime  insulin lispro Injectable (HumaLOG) 2 Unit(s) SubCutaneous three times a day before meals  lisinopril 40 milliGRAM(s) Oral daily  metoprolol succinate ER 50 milliGRAM(s) Oral daily  multivitamin 1 Tablet(s) Oral daily  sevelamer carbonate 1600 milliGRAM(s) Oral three times a day with meals    MEDICATIONS  (PRN):  acetaminophen   Tablet .. 650 milliGRAM(s) Oral every 6 hours PRN Temp greater or equal to 38.5C (101.3F), Mild Pain (1 - 3)  dextrose 40% Gel 15 Gram(s) Oral once PRN Blood Glucose LESS THAN 70 milliGRAM(s)/deciliter  glucagon  Injectable 1 milliGRAM(s) IntraMuscular once PRN Glucose LESS THAN 70 milligrams/deciliter  insulin lispro Injectable (HumaLOG) 2 Unit(s) SubCutaneous at bedtime PRN snack  oxyCODONE    5 mG/acetaminophen 325 mG 1 Tablet(s) Oral every 6 hours PRN Moderate Pain (4 - 6)      Vital Signs Last 24 Hrs  T(C): 36.3 (20 May 2019 12:21), Max: 36.8 (19 May 2019 20:23)  T(F): 97.3 (20 May 2019 12:21), Max: 98.2 (19 May 2019 20:23)  HR: 69 (20 May 2019 13:58) (65 - 74)  BP: 156/67 (20 May 2019 13:58) (127/68 - 156/67)  BP(mean): --  RR: 18 (20 May 2019 13:58) (17 - 18)  SpO2: 98% (20 May 2019 13:58) (94% - 98%)    PHYSICAL EXAM:  GENERAL: NAD, well-developed  HEAD:  Atraumatic, Normocephalic  EYES: EOMI, PERRLA, conjunctiva and sclera clear  NECK: Supple, No JVD  CHEST/LUNG: Clear to auscultation bilaterally; No wheeze  HEART: Regular rate and rhythm; No murmurs, rubs, or gallops  ABDOMEN: Soft, Nontender, Nondistended; Bowel sounds present  EXTREMITIES:  2+ Peripheral Pulses, No clubbing, cyanosis, or edema  PSYCH: AAOx3  NEUROLOGY: non-focal  SKIN: No rashes or lesions    LABS:    05-20    132<L>  |  91<L>  |  21  ----------------------------<  125<H>  4.1   |  27  |  4.27<H>    Ca    9.5      20 May 2019 07:14  Phos  4.5     05-20  Mg     2.2     05-20                  RADIOLOGY & ADDITIONAL TESTS:    Imaging Personally Reviewed:  < from: [facility] Duplex Hemodialysis Access, Left (05.17.19 @ 18:32) >  Impression: There is a patent left brachial to cephalic vein dialysis   access fistula.  There is a flow-limiting stenosis of the egress vein in the proximal   upper arm with velocities measuring as high as 524/254 cm/s.  There is a partially thrombosed stent in the egress cephalic vein central   to the stenosis in the proximal upper arm.    < end of copied text >    Consultant(s) Notes Reviewed:      Care Discussed with Consultants/Other Providers:

## 2019-05-20 NOTE — PROGRESS NOTE ADULT - PROBLEM SELECTOR PLAN 1
-test BG AC/HS  -c/w Lantus 6 units QHS, If FBG <100s will switch to Levemir. BG at goal while on lantus  -c/w Humalog 3 units AC meals. (if BG less than 100mg/dl-can reduce dose to 2 units x 1 for that meal-get one time order).   -c/w Humalog low correction scale AC and Low HS scale  Can be discharged on basal/bolus (home regimen Levemir 4 at hs plus Humalog 3 ac meals)  -f/u outpt w/private endo Pina Ley  -Plan discussed with pt/team. If NPO for procedure please decrease Lantus dose to 4 units.   Contact info: [phone] (24/7). pager [phone]

## 2019-05-20 NOTE — CONSULT NOTE ADULT - CONSULT REASON
Fistula stenosis
hyperkalemia
Electrolyte abnormalities
edema
Uncontrolled Type 1 DM w/ hyperglycemia

## 2019-05-20 NOTE — PROGRESS NOTE ADULT - SUBJECTIVE AND OBJECTIVE BOX
Cardiovascular Disease Progress Note    Overnight events: No acute events overnight.  leg pain and edema better. no new cardiac sx  Otherwise review of systems negative    Objective Findings:  T(C): 36.8 (05-20-19 @ 04:05), Max: 37 (05-19-19 @ 17:31)  HR: 73 (05-20-19 @ 05:18) (65 - 73)  BP: 144/80 (05-20-19 @ 05:18) (123/59 - 153/81)  RR: 18 (05-20-19 @ 05:18) (18 - 18)  SpO2: 94% (05-20-19 @ 05:18) (94% - 99%)  Wt(kg): --  Daily     Daily       Physical Exam:  Gen: NAD  HEENT: EOMI  CV: RRR, normal S1 + S2, no m/r/g  Lungs: CTAB  Abd: soft, non-tender  Ext: trace edema      Laboratory Data:    05-19    133<L>  |  93<L>  |  15  ----------------------------<  374<H>  4.2   |  28  |  2.63<H>    Ca    9.2      19 May 2019 06:18                Inpatient Medications:  MEDICATIONS  (STANDING):  aspirin enteric coated 81 milliGRAM(s) Oral daily  atorvastatin 40 milliGRAM(s) Oral at bedtime  clopidogrel Tablet 75 milliGRAM(s) Oral daily  dextrose 5%. 1000 milliLiter(s) (50 mL/Hr) IV Continuous <Continuous>  dextrose 50% Injectable 12.5 Gram(s) IV Push once  dextrose 50% Injectable 25 Gram(s) IV Push once  dextrose 50% Injectable 25 Gram(s) IV Push once  DULoxetine 60 milliGRAM(s) Oral daily  furosemide    Tablet 20 milliGRAM(s) Oral <User Schedule>  heparin  Injectable 5000 Unit(s) SubCutaneous every 12 hours  hydrALAZINE 25 milliGRAM(s) Oral three times a day  insulin glargine Injectable (LANTUS) 6 Unit(s) SubCutaneous at bedtime  insulin lispro (HumaLOG) corrective regimen sliding scale   SubCutaneous three times a day before meals  insulin lispro (HumaLOG) corrective regimen sliding scale   SubCutaneous at bedtime  insulin lispro Injectable (HumaLOG) 3 Unit(s) SubCutaneous three times a day before meals  lisinopril 40 milliGRAM(s) Oral daily  metoprolol succinate ER 50 milliGRAM(s) Oral daily  multivitamin 1 Tablet(s) Oral daily  sevelamer carbonate 1600 milliGRAM(s) Oral three times a day with meals      Assessment:  -leg pain  -hyperkalemia  -recurrent DKA  -volume overload  -ischemic cardiomyopathy, cad s/p multiple stents  -esrd on hd  -PAD s/p prior intervention   -      Recs:  -leg pain, hx of PAD. sx not consistent with CLI. most likely neuropathic - consider trial of gabapentin. no obvious infection. possibly related to edema 2/2 volume overload. c/w HD. further management of PAD as outpatient  -volume overload --> c/w HD to maintain euvolemia  -hyperglycemia --> f/u endo. improved  -ischemic cardiomyopathy s/p LHC with Dr Vela 2/20 --> severe and diffuse instent restenosis along RCA --> unable to stent due to multiple layers of stenting and prior brachytherapy. we can consider complex intervention if true ischemic sx develop. c/w medical treatment with anti-platelet, statins and anti-anginals for now.  c/w metop succinate 50mg daily for anti-anginal effect and for pAT/NSVT  -c/w beta blockers for nsvt/pat/cad (as above)  -hx of prolonged qtc. avoid qtc prolonging meds  -s/p recent TTE: mod-severe MR, pseudo AS (low flow-low gradient), mod-severe LV dysfunction --> recent CTS consult with dr anup donaldson. plan is for medical management given surgical risk and patient preference  -c/w asa, plavix, statin for ischemic cardiomyopathy/CAD/PAD  -dvt ppx  -dispo planning        Over 25 minutes spent on total encounter; more than 50% of the visit was spent counseling and/or coordinating care by the attending physician.      Julián Biswas MD   Cardiovascular Disease  [phone]

## 2019-05-20 NOTE — PROGRESS NOTE ADULT - SUBJECTIVE AND OBJECTIVE BOX
Diabetes Follow up note: Saw pt earlier today  Interval Hx: 64 yo woman with uncontrolled type 1 diabetes (well known from multiple prior admissions) DM c/b ESRD on HD, neuropathy, CAD, CVA, CHF and multiple cardiac stents presents w/LE edema and SOB. Pt reports feeling better and wants to go home. States LE improved and  no further SOB noted. Tolerating POs with BGs levels variable between 90 to 200s in last 24 hours. No hypoglycemia.      Review of Systems:  General: as above.   GI: Tolerating POs without any N/V/D/ABD PAIN.  CV: No CP/SOB  ENDO: No S&Sx of hypoglycemia    MEDS:  atorvastatin 40 milliGRAM(s) Oral at bedtime  insulin glargine Injectable (LANTUS) 6 Unit(s) SubCutaneous at bedtime  insulin lispro (HumaLOG) corrective regimen sliding scale   SubCutaneous three times a day before meals  insulin lispro (HumaLOG) corrective regimen sliding scale   SubCutaneous at bedtime  insulin lispro Injectable (HumaLOG) 3 Unit(s) SubCutaneous three times a day before meals    Allergies    No Known Allergies      PE:  General: Female lying in bed in NAD.   Vital Signs Last 24 Hrs  T(C): 36.3 (05-20-19 @ 12:21), Max: 37 (05-19-19 @ 17:31)  T(F): 97.3 (05-20-19 @ 12:21), Max: 98.6 (05-19-19 @ 17:31)  HR: 69 (05-20-19 @ 13:58) (65 - 74)  BP: 156/67 (05-20-19 @ 13:58) (127/68 - 156/67)  BP(mean): --  RR: 18 (05-20-19 @ 13:58) (17 - 18)  SpO2: 98% (05-20-19 @ 13:58) (94% - 98%)  Abd: Soft, NT, ND   Extremities: Warm. 1+ B/L LE edema L>R  Neuro: A&O X3    LABS:  POCT Blood Glucose.: 90 mg/dL (05-20-19 @ 11:35)  POCT Blood Glucose.: 134 mg/dL (05-20-19 @ 07:48)  POCT Blood Glucose.: 124 mg/dL (05-19-19 @ 21:00)  POCT Blood Glucose.: 202 mg/dL (05-19-19 @ 16:25)  POCT Blood Glucose.: 185 mg/dL (05-19-19 @ 11:51)  POCT Blood Glucose.: 339 mg/dL (05-19-19 @ 08:00)  POCT Blood Glucose.: 232 mg/dL (05-18-19 @ 21:16)  POCT Blood Glucose.: 84 mg/dL (05-18-19 @ 16:57)                          9.3    4.1   )-----------( 207      ( 18 May 2019 05:21 )             28.7     05-20    132<L>  |  91<L>  |  21  ----------------------------<  125<H>  4.1   |  27  |  4.27<H>    Ca    9.5      20 May 2019 07:14  Phos  4.5     05-20  Mg     2.2     05-20      Hemoglobin A1C, Whole Blood: 8.8 % <H> [4.0 - 5.6] (04-30-19 @ 08:58)  Hemoglobin A1C, Whole Blood: 7.9 % <H> [4.0 - 5.6] (02-27-19 @ 11:08)

## 2019-05-20 NOTE — CHART NOTE - NSCHARTNOTEFT_GEN_A_CORE
NP note -  asymptomatic WCT  called by RN for WCT 7 beats. pt seen and examined on the chair. denied any discomfort. on lopressor.       Vital Signs Last 24 Hrs  T(C): 36.3 (20 May 2019 12:21), Max: 37 (19 May 2019 17:31)  T(F): 97.3 (20 May 2019 12:21), Max: 98.6 (19 May 2019 17:31)  HR: 74 (20 May 2019 12:21) (65 - 74)  BP: 145/82 (20 May 2019 12:21) (123/59 - 153/81)  BP(mean): --  RR: 17 (20 May 2019 12:21) (17 - 18)  SpO2: 96% (20 May 2019 12:21) (94% - 99%)    05-20    132<L>  |  91<L>  |  21  ----------------------------<  125<H>  4.1   |  27  |  4.27<H>    Ca    9.5      20 May 2019 07:14      General: WN/WD NAD  Neurology: A&Ox3, nonfocal, CARR x 4  Head:  Normocephalic, atraumatic  Respiratory: CTA B/L  CV: RRR, S1S2, no murmur  Abdominal: Soft, NT, ND no palpable mass  MSK: No edema, + peripheral pulses, FROM all 4 extremity    A/P  HPI:  62 y/o Female PMH CAD Sp PTCA w stents CHF,COPD, CVA, DMT1, ESRD on HD (M,Tu,Th,Sa), Gout, HLD, PVD, Sublcavian Stensosis (L) sp stent. PSH ASD Repair,idania,fem-pop bypass. Last dc from hospt approx 2wk ago now comes to ED via ems complains of lower extr swelling maribell with pain. EMS report pt in mod pain necessitating fentanyl and that pt seemed very sleepy. Pt comes to ed complains of maribell lower extr pain onset yesterday "real bad". Pt has reported le pain in past without clear dx according to pt. No hx trauma. No fever chiil shortness of breath cheat pain. Pain improves with ambulation. (16 May 2019 13:09)  Now with asymptomatic WCT  1. K 4.1. added serum mg and phos to morning lab. will f/u lytes. will repeat BMP, mg, phos in AM.   2. continue lopressor 50mg ER QD.   3. c/w tele, O2 supplement to keep O2 Sat > 90%.   4. will continue to monitor pt.    NP. [place]  [phone]

## 2019-05-20 NOTE — PROGRESS NOTE ADULT - SUBJECTIVE AND OBJECTIVE BOX
[facility]   [phone]    Chief Complaint:     24 hour events/subjective:    seen earlier.   no worsening sob         ALLERGIES & MEDICATIONS  --------------------------------------------------------------------------------  Allergies    No Known Allergies    Intolerances      Standing Inpatient Medications  aspirin enteric coated 81 milliGRAM(s) Oral daily  atorvastatin 40 milliGRAM(s) Oral at bedtime  clopidogrel Tablet 75 milliGRAM(s) Oral daily  dextrose 5%. 1000 milliLiter(s) IV Continuous <Continuous>  dextrose 50% Injectable 12.5 Gram(s) IV Push once  dextrose 50% Injectable 25 Gram(s) IV Push once  dextrose 50% Injectable 25 Gram(s) IV Push once  DULoxetine 60 milliGRAM(s) Oral daily  furosemide    Tablet 20 milliGRAM(s) Oral <User Schedule>  heparin  Injectable 5000 Unit(s) SubCutaneous every 12 hours  hydrALAZINE 25 milliGRAM(s) Oral three times a day  insulin detemir injectable (LEVEMIR) 6 Unit(s) SubCutaneous at bedtime  insulin lispro (HumaLOG) corrective regimen sliding scale   SubCutaneous three times a day before meals  insulin lispro (HumaLOG) corrective regimen sliding scale   SubCutaneous at bedtime  insulin lispro Injectable (HumaLOG) 2 Unit(s) SubCutaneous three times a day before meals  lisinopril 40 milliGRAM(s) Oral daily  metoprolol succinate ER 50 milliGRAM(s) Oral daily  multivitamin 1 Tablet(s) Oral daily  sevelamer carbonate 1600 milliGRAM(s) Oral three times a day with meals    PRN Inpatient Medications  acetaminophen   Tablet .. 650 milliGRAM(s) Oral every 6 hours PRN  dextrose 40% Gel 15 Gram(s) Oral once PRN  glucagon  Injectable 1 milliGRAM(s) IntraMuscular once PRN  insulin lispro Injectable (HumaLOG) 2 Unit(s) SubCutaneous at bedtime PRN  oxyCODONE    5 mG/acetaminophen 325 mG 1 Tablet(s) Oral every 6 hours PRN      REVIEW OF SYSTEMS  --------------------------------------------------------------------------------  no itching or rash  no fever or chill  no cp or palp   no sob or cough   no N/V/D/ no abd pain   ext no edema         VITALS/PHYSICAL EXAM  --------------------------------------------------------------------------------  T(C): 36.8 (05-20-19 @ 20:44), Max: 36.8 (05-20-19 @ 04:05)  HR: 78 (05-20-19 @ 20:44) (67 - 78)  BP: 164/77 (05-20-19 @ 20:44) (127/68 - 164/77)  RR: 19 (05-20-19 @ 20:44) (17 - 19)  SpO2: 94% (05-20-19 @ 20:44) (94% - 98%)  Wt(kg): --        05-19-19 @ 07:01  -  05-20-19 @ 07:00  --------------------------------------------------------  IN: 915 mL / OUT: 0 mL / NET: 915 mL    05-20-19 @ 07:01  -  05-20-19 @ 20:56  --------------------------------------------------------  IN: 700 mL / OUT: 0 mL / NET: 700 mL      Physical Exam:  		    	Gen: alert oriented place person and date   	Pulm: Decreased breath sounds b/l bases no rales or ronchi  	CV: RRR, S1/S2  	Abd: +BS, soft, nontender/nondistended  	Extremity: No cyanosis, trace edema no clubbing  	    LABS/STUDIES  --------------------------------------------------------------------------------    132  |  91  |  21  ----------------------------<  125      [05-20-19 @ 07:14]  4.1   |  27  |  4.27        Ca     9.5     [05-20-19 @ 07:14]      Mg     2.2     [05-20-19 @ 07:14]      Phos  4.5     [05-20-19 @ 07:14]                    imp/suggest: ESRD      Hemodialysis Prescription:  	Access:  	Dialyzer: revaclear   	Blood Flow (mL/Min): 400  	Dialysate Flow (mL/Min): 600  	Target UF (Liters):  	Treatment Time:  	Potassium:   	Calcium: 2.5  	  YOLANDA    Vitamin D     continue with hd   see hd flow sheet

## 2019-05-21 ENCOUNTER — TRANSCRIPTION ENCOUNTER (OUTPATIENT)
Age: 64
End: 2019-05-21

## 2019-05-21 VITALS
SYSTOLIC BLOOD PRESSURE: 165 MMHG | DIASTOLIC BLOOD PRESSURE: 70 MMHG | HEART RATE: 76 BPM | RESPIRATION RATE: 18 BRPM | OXYGEN SATURATION: 95 % | TEMPERATURE: 98 F

## 2019-05-21 LAB
ANION GAP SERPL CALC-SCNC: 12 MMOL/L — SIGNIFICANT CHANGE UP (ref 5–17)
APTT BLD: 30.4 SEC — SIGNIFICANT CHANGE UP (ref 27.5–36.3)
BLD GP AB SCN SERPL QL: NEGATIVE — SIGNIFICANT CHANGE UP
BUN SERPL-MCNC: 30 MG/DL — HIGH (ref 7–23)
CALCIUM SERPL-MCNC: 9.5 MG/DL — SIGNIFICANT CHANGE UP (ref 8.4–10.5)
CHLORIDE SERPL-SCNC: 92 MMOL/L — LOW (ref 96–108)
CO2 SERPL-SCNC: 29 MMOL/L — SIGNIFICANT CHANGE UP (ref 22–31)
CREAT SERPL-MCNC: 5.93 MG/DL — HIGH (ref 0.5–1.3)
GLUCOSE BLDC GLUCOMTR-MCNC: 113 MG/DL — HIGH (ref 70–99)
GLUCOSE BLDC GLUCOMTR-MCNC: 114 MG/DL — HIGH (ref 70–99)
GLUCOSE BLDC GLUCOMTR-MCNC: 176 MG/DL — HIGH (ref 70–99)
GLUCOSE BLDC GLUCOMTR-MCNC: 211 MG/DL — HIGH (ref 70–99)
GLUCOSE BLDC GLUCOMTR-MCNC: 57 MG/DL — LOW (ref 70–99)
GLUCOSE BLDC GLUCOMTR-MCNC: 58 MG/DL — LOW (ref 70–99)
GLUCOSE BLDC GLUCOMTR-MCNC: 74 MG/DL — SIGNIFICANT CHANGE UP (ref 70–99)
GLUCOSE SERPL-MCNC: 71 MG/DL — SIGNIFICANT CHANGE UP (ref 70–99)
HCT VFR BLD CALC: 29 % — LOW (ref 34.5–45)
HGB BLD-MCNC: 9.4 G/DL — LOW (ref 11.5–15.5)
INR BLD: 0.98 RATIO — SIGNIFICANT CHANGE UP (ref 0.88–1.16)
MCHC RBC-ENTMCNC: 32.4 GM/DL — SIGNIFICANT CHANGE UP (ref 32–36)
MCHC RBC-ENTMCNC: 33.3 PG — SIGNIFICANT CHANGE UP (ref 27–34)
MCV RBC AUTO: 102.8 FL — HIGH (ref 80–100)
PLATELET # BLD AUTO: 206 K/UL — SIGNIFICANT CHANGE UP (ref 150–400)
POTASSIUM SERPL-MCNC: 4.6 MMOL/L — SIGNIFICANT CHANGE UP (ref 3.5–5.3)
POTASSIUM SERPL-SCNC: 4.6 MMOL/L — SIGNIFICANT CHANGE UP (ref 3.5–5.3)
PROTHROM AB SERPL-ACNC: 11.1 SEC — SIGNIFICANT CHANGE UP (ref 10–13.1)
RBC # BLD: 2.82 M/UL — LOW (ref 3.8–5.2)
RBC # FLD: 14.6 % — HIGH (ref 10.3–14.5)
RH IG SCN BLD-IMP: POSITIVE — SIGNIFICANT CHANGE UP
SODIUM SERPL-SCNC: 133 MMOL/L — LOW (ref 135–145)
WBC # BLD: 4.46 K/UL — SIGNIFICANT CHANGE UP (ref 3.8–10.5)
WBC # FLD AUTO: 4.46 K/UL — SIGNIFICANT CHANGE UP (ref 3.8–10.5)

## 2019-05-21 PROCEDURE — 82010 KETONE BODYS QUAN: CPT

## 2019-05-21 PROCEDURE — 93970 EXTREMITY STUDY: CPT

## 2019-05-21 PROCEDURE — 86900 BLOOD TYPING SEROLOGIC ABO: CPT

## 2019-05-21 PROCEDURE — 84132 ASSAY OF SERUM POTASSIUM: CPT

## 2019-05-21 PROCEDURE — 85027 COMPLETE CBC AUTOMATED: CPT

## 2019-05-21 PROCEDURE — 84295 ASSAY OF SERUM SODIUM: CPT

## 2019-05-21 PROCEDURE — 80048 BASIC METABOLIC PNL TOTAL CA: CPT

## 2019-05-21 PROCEDURE — 82330 ASSAY OF CALCIUM: CPT

## 2019-05-21 PROCEDURE — 83605 ASSAY OF LACTIC ACID: CPT

## 2019-05-21 PROCEDURE — 86901 BLOOD TYPING SEROLOGIC RH(D): CPT

## 2019-05-21 PROCEDURE — 82947 ASSAY GLUCOSE BLOOD QUANT: CPT

## 2019-05-21 PROCEDURE — 85014 HEMATOCRIT: CPT

## 2019-05-21 PROCEDURE — 99232 SBSQ HOSP IP/OBS MODERATE 35: CPT

## 2019-05-21 PROCEDURE — 82803 BLOOD GASES ANY COMBINATION: CPT

## 2019-05-21 PROCEDURE — 80053 COMPREHEN METABOLIC PANEL: CPT

## 2019-05-21 PROCEDURE — 82962 GLUCOSE BLOOD TEST: CPT

## 2019-05-21 PROCEDURE — 93990 DOPPLER FLOW TESTING: CPT

## 2019-05-21 PROCEDURE — 71045 X-RAY EXAM CHEST 1 VIEW: CPT

## 2019-05-21 PROCEDURE — 85730 THROMBOPLASTIN TIME PARTIAL: CPT

## 2019-05-21 PROCEDURE — 86850 RBC ANTIBODY SCREEN: CPT

## 2019-05-21 PROCEDURE — 85610 PROTHROMBIN TIME: CPT

## 2019-05-21 PROCEDURE — 99261: CPT

## 2019-05-21 PROCEDURE — 83735 ASSAY OF MAGNESIUM: CPT

## 2019-05-21 PROCEDURE — 84100 ASSAY OF PHOSPHORUS: CPT

## 2019-05-21 PROCEDURE — 82435 ASSAY OF BLOOD CHLORIDE: CPT

## 2019-05-21 PROCEDURE — 99285 EMERGENCY DEPT VISIT HI MDM: CPT

## 2019-05-21 PROCEDURE — 93005 ELECTROCARDIOGRAM TRACING: CPT

## 2019-05-21 RX ORDER — ERYTHROPOIETIN 10000 [IU]/ML
10000 INJECTION, SOLUTION INTRAVENOUS; SUBCUTANEOUS ONCE
Refills: 0 | Status: COMPLETED | OUTPATIENT
Start: 2019-05-21 | End: 2019-05-21

## 2019-05-21 RX ORDER — INSULIN DETEMIR 100/ML (3)
4 INSULIN PEN (ML) SUBCUTANEOUS AT BEDTIME
Refills: 0 | Status: DISCONTINUED | OUTPATIENT
Start: 2019-05-21 | End: 2019-05-21

## 2019-05-21 RX ORDER — INSULIN LISPRO 100/ML
3 VIAL (ML) SUBCUTANEOUS
Qty: 0 | Refills: 0 | DISCHARGE

## 2019-05-21 RX ORDER — HEPARIN SODIUM 5000 [USP'U]/ML
1000 INJECTION INTRAVENOUS; SUBCUTANEOUS ONCE
Refills: 0 | Status: COMPLETED | OUTPATIENT
Start: 2019-05-21 | End: 2019-05-21

## 2019-05-21 RX ADMIN — LISINOPRIL 40 MILLIGRAM(S): 2.5 TABLET ORAL at 05:19

## 2019-05-21 RX ADMIN — OXYCODONE AND ACETAMINOPHEN 1 TABLET(S): 5; 325 TABLET ORAL at 01:38

## 2019-05-21 RX ADMIN — HEPARIN SODIUM 1000 UNIT(S): 5000 INJECTION INTRAVENOUS; SUBCUTANEOUS at 13:00

## 2019-05-21 RX ADMIN — ERYTHROPOIETIN 10000 UNIT(S): 10000 INJECTION, SOLUTION INTRAVENOUS; SUBCUTANEOUS at 13:18

## 2019-05-21 RX ADMIN — Medication 50 MILLIGRAM(S): at 05:19

## 2019-05-21 RX ADMIN — CLOPIDOGREL BISULFATE 75 MILLIGRAM(S): 75 TABLET, FILM COATED ORAL at 11:51

## 2019-05-21 RX ADMIN — OXYCODONE AND ACETAMINOPHEN 1 TABLET(S): 5; 325 TABLET ORAL at 00:33

## 2019-05-21 RX ADMIN — Medication 81 MILLIGRAM(S): at 11:51

## 2019-05-21 RX ADMIN — Medication 25 MILLIGRAM(S): at 05:19

## 2019-05-21 RX ADMIN — Medication 25 MILLIGRAM(S): at 17:53

## 2019-05-21 RX ADMIN — SEVELAMER CARBONATE 1600 MILLIGRAM(S): 2400 POWDER, FOR SUSPENSION ORAL at 08:18

## 2019-05-21 RX ADMIN — DULOXETINE HYDROCHLORIDE 60 MILLIGRAM(S): 30 CAPSULE, DELAYED RELEASE ORAL at 11:51

## 2019-05-21 RX ADMIN — Medication 2: at 11:51

## 2019-05-21 RX ADMIN — Medication 1 TABLET(S): at 11:51

## 2019-05-21 NOTE — PROGRESS NOTE ADULT - SUBJECTIVE AND OBJECTIVE BOX
[facility]   [phone]  DIALYSIS NOTE  Chief Complaint: ESRD/Ongoing hemodialysis requirement. seen on hd    24 hour events/subjective:    no new c/o         ALLERGIES & MEDICATIONS  --------------------------------------------------------------------------------  Allergies    No Known Allergies    Intolerances      Standing Inpatient Medications  aspirin enteric coated 81 milliGRAM(s) Oral daily  atorvastatin 40 milliGRAM(s) Oral at bedtime  clopidogrel Tablet 75 milliGRAM(s) Oral daily  dextrose 5%. 1000 milliLiter(s) IV Continuous <Continuous>  dextrose 50% Injectable 12.5 Gram(s) IV Push once  dextrose 50% Injectable 25 Gram(s) IV Push once  dextrose 50% Injectable 25 Gram(s) IV Push once  DULoxetine 60 milliGRAM(s) Oral daily  furosemide    Tablet 20 milliGRAM(s) Oral <User Schedule>  heparin  Injectable 5000 Unit(s) SubCutaneous every 12 hours  hydrALAZINE 25 milliGRAM(s) Oral three times a day  insulin detemir injectable (LEVEMIR) 4 Unit(s) SubCutaneous at bedtime  insulin lispro (HumaLOG) corrective regimen sliding scale   SubCutaneous three times a day before meals  insulin lispro (HumaLOG) corrective regimen sliding scale   SubCutaneous at bedtime  insulin lispro Injectable (HumaLOG) 2 Unit(s) SubCutaneous three times a day before meals  lisinopril 40 milliGRAM(s) Oral daily  metoprolol succinate ER 50 milliGRAM(s) Oral daily  multivitamin 1 Tablet(s) Oral daily  sevelamer carbonate 1600 milliGRAM(s) Oral three times a day with meals    PRN Inpatient Medications  acetaminophen   Tablet .. 650 milliGRAM(s) Oral every 6 hours PRN  dextrose 40% Gel 15 Gram(s) Oral once PRN  glucagon  Injectable 1 milliGRAM(s) IntraMuscular once PRN  insulin lispro Injectable (HumaLOG) 2 Unit(s) SubCutaneous at bedtime PRN  oxyCODONE    5 mG/acetaminophen 325 mG 1 Tablet(s) Oral every 6 hours PRN      REVIEW OF SYSTEMS  --------------------------------------------------------------------------------  no itching or rash  no fever or chill  no cp or palp   no sob or cough   no N/V/D/ no abd pain   ext no edema         VITALS/PHYSICAL EXAM  --------------------------------------------------------------------------------  T(C): 36.6 (05-21-19 @ 12:47), Max: 36.8 (05-20-19 @ 20:44)  HR: 71 (05-21-19 @ 12:47) (70 - 78)  BP: 104/65 (05-21-19 @ 12:47) (104/65 - 164/77)  RR: 17 (05-21-19 @ 12:47) (17 - 19)  SpO2: 97% (05-21-19 @ 12:47) (94% - 97%)  Wt(kg): --        05-20-19 @ 07:01  -  05-21-19 @ 07:00  --------------------------------------------------------  IN: 700 mL / OUT: 0 mL / NET: 700 mL    05-21-19 @ 07:01  -  05-21-19 @ 17:11  --------------------------------------------------------  IN: 675 mL / OUT: 200 mL / NET: 475 mL      Physical Exam:  		    	Gen: alert oriented place person and date   	Pulm: Decreased breath sounds b/l bases no rales or ronchi  	CV: RRR, S1/S2  	Abd: +BS, soft, nontender/nondistended  	Extremity: No cyanosis, no edema no clubbing  	    LABS/STUDIES  --------------------------------------------------------------------------------              9.4    4.46  >-----------<  206      [05-21-19 @ 08:54]              29.0     133  |  92  |  30  ----------------------------<  71      [05-21-19 @ 06:20]  4.6   |  29  |  5.93        Ca     9.5     [05-21-19 @ 06:20]      Mg     2.2     [05-20-19 @ 07:14]      Phos  4.5     [05-20-19 @ 07:14]      PT/INR: PT 11.1 , INR 0.98       [05-21-19 @ 09:16]  PTT: 30.4       [05-21-19 @ 09:16]              imp/suggest: ESRD      Hemodialysis Prescription:  	Access:  	Dialyzer: revaclear   	Blood Flow (mL/Min): 400  	Dialysate Flow (mL/Min): 600  	Target UF (Liters):  	Treatment Time:  	Potassium:   	Calcium: 2.5  	  YOLANDA    Vitamin D     continue with hd   see hd flow sheet

## 2019-05-21 NOTE — PROVIDER CONTACT NOTE (OTHER) - ASSESSMENT
Patient slightly dizzy, VSS.
Patient A&O - denies dizziness, weakness. VSS.
VSS. Patient denies discomfort,

## 2019-05-21 NOTE — DISCHARGE NOTE NURSING/CASE MANAGEMENT/SOCIAL WORK - NSDCPEPT PROEDHF_GEN_ALL_CORE
Low salt diet/Activities as tolerated/Call primary care provider for follow up after discharge/Monitor weight daily/Report signs and symptoms to primary care provider

## 2019-05-21 NOTE — PROGRESS NOTE ADULT - SUBJECTIVE AND OBJECTIVE BOX
Vascular Surgery Progress Note     Subjective: The patient had an ultrasound of her LUE AV fistula done on 5/17 that was notable for venous stenosis of the fistula with elevated peak velocities, and a partially thrombosed subclavian stent. Patient was seen and examined. No fever chills, shortness of breath, or chest pain. Lower extremity pain improving  with ambulation. Patient to undergo HD today and obtain fistulogram with Dr. Elizalde as an outpatient. Pt denies f/c, n/v, SOB.        Objective:    PHYSICAL EXAM:   General: NAD, Lying in bed comfortably  Neuro: A+Ox3  HEENT: NC/AT, EOMI  Neck: Soft, supple  Cardio: RRR, normal S1/S2  Resp: CTA b/l  GI/Abd: Soft, NT/ND, no rebound/guarding, no masses palpated  LUE: WWP. Weakly palpable radial pulse. Strength & sensation intact. LUE AV fistula with palpable thrill.       MEDICATIONS  (STANDING):  aspirin enteric coated 81 milliGRAM(s) Oral daily  atorvastatin 40 milliGRAM(s) Oral at bedtime  clopidogrel Tablet 75 milliGRAM(s) Oral daily  dextrose 5%. 1000 milliLiter(s) (50 mL/Hr) IV Continuous <Continuous>  dextrose 50% Injectable 12.5 Gram(s) IV Push once  dextrose 50% Injectable 25 Gram(s) IV Push once  dextrose 50% Injectable 25 Gram(s) IV Push once  DULoxetine 60 milliGRAM(s) Oral daily  epoetin azalea Injectable 27974 Unit(s) IV Push once  furosemide    Tablet 20 milliGRAM(s) Oral <User Schedule>  heparin  Injectable 5000 Unit(s) SubCutaneous every 12 hours  heparin  Injectable. 1000 Unit(s) Dialysis. once  hydrALAZINE 25 milliGRAM(s) Oral three times a day  insulin detemir injectable (LEVEMIR) 4 Unit(s) SubCutaneous at bedtime  insulin lispro (HumaLOG) corrective regimen sliding scale   SubCutaneous three times a day before meals  insulin lispro (HumaLOG) corrective regimen sliding scale   SubCutaneous at bedtime  insulin lispro Injectable (HumaLOG) 2 Unit(s) SubCutaneous three times a day before meals  lisinopril 40 milliGRAM(s) Oral daily  metoprolol succinate ER 50 milliGRAM(s) Oral daily  multivitamin 1 Tablet(s) Oral daily  sevelamer carbonate 1600 milliGRAM(s) Oral three times a day with meals    MEDICATIONS  (PRN):  acetaminophen   Tablet .. 650 milliGRAM(s) Oral every 6 hours PRN Temp greater or equal to 38.5C (101.3F), Mild Pain (1 - 3)  dextrose 40% Gel 15 Gram(s) Oral once PRN Blood Glucose LESS THAN 70 milliGRAM(s)/deciliter  glucagon  Injectable 1 milliGRAM(s) IntraMuscular once PRN Glucose LESS THAN 70 milligrams/deciliter  insulin lispro Injectable (HumaLOG) 2 Unit(s) SubCutaneous at bedtime PRN snack  oxyCODONE    5 mG/acetaminophen 325 mG 1 Tablet(s) Oral every 6 hours PRN Moderate Pain (4 - 6)      Vital Signs Last 24 Hrs  T(C): 36.7 (21 May 2019 04:14), Max: 36.8 (20 May 2019 20:44)  T(F): 98 (21 May 2019 04:14), Max: 98.3 (20 May 2019 20:44)  HR: 70 (21 May 2019 04:14) (69 - 78)  BP: 137/71 (21 May 2019 04:14) (137/71 - 164/77)  BP(mean): --  RR: 18 (21 May 2019 04:14) (18 - 19)  SpO2: 97% (21 May 2019 04:14) (94% - 98%)      05-20-19 @ 07:01  -  05-21-19 @ 07:00  --------------------------------------------------------  IN: 700 mL / OUT: 0 mL / NET: 700 mL    05-21-19 @ 07:01  -  05-21-19 @ 13:05  --------------------------------------------------------  IN: 375 mL / OUT: 200 mL / NET: 175 mL        LABS:                        9.4    4.46  )-----------( 206      ( 21 May 2019 08:54 )             29.0     05-21    133<L>  |  92<L>  |  30<H>  ----------------------------<  71  4.6   |  29  |  5.93<H>    Ca    9.5      21 May 2019 06:20  Phos  4.5     05-20  Mg     2.2     05-20      IMAGING:  < from: [facility] Duplex Hemodialysis Access, Left (05.17.19 @ 18:32) >  There is a long-standing surgically constructed dialysis access fistula   created by the anastomosis of the cephalic vein to the brachial artery at   the level of the antecubital fossa.    There is a high velocity, low resistance pattern of antegrade flow, as   expected, through the left brachial artery proximal to the surgical   anastomosis.    There is atheromatous intimal thickening and irregularity along the   course of the inflow brachial artery.    There is dilatation of the egresscephalic vein.    There is arterialization with atheromatous plaque protruding into the   lumen along the course of the egress cephalic vein.    There is a flow-limiting stenosis of the of the egress cephalic vein in   the proximal upper arm with velocities measuring as high as 524/254 cm/s.    There is a stent central to the stenosis in the proximal upper arm with   in-stent thrombus. However, the measured velocities through the stent are   not elevated.    Impression: There is a patent left brachial to cephalic vein dialysis   access fistula.  There is a flow-limiting stenosis of the egress vein in the proximal   upper arm with velocities measuring as high as 524/254 cm/s.  There is a partially thrombosed stent in the egress cephalic vein central   to the stenosis in the proximal upper arm.    < end of copied text > Vascular Surgery Progress Note     Subjective: The patient had an ultrasound of her LUE AV fistula done on 5/17 that was notable for venous stenosis of the fistula with elevated peak velocities, and a partially thrombosed subclavian stent. Patient was seen and examined. No fever chills, shortness of breath, or chest pain. Lower extremity pain improving  with ambulation. Patient to undergo HD today and obtain fistulogram with Dr. Elizalde as an outpatient.        Objective:    PHYSICAL EXAM:   General: NAD, Lying in bed comfortably  Neuro: A+Ox3  HEENT: NC/AT, EOMI  Neck: Soft, supple  Cardio: RRR, normal S1/S2  Resp: CTA b/l  GI/Abd: Soft, NT/ND, no rebound/guarding, no masses palpated  LUE: WWP. Weakly palpable radial pulse. Strength & sensation intact. LUE AV fistula with palpable thrill.       MEDICATIONS  (STANDING):  aspirin enteric coated 81 milliGRAM(s) Oral daily  atorvastatin 40 milliGRAM(s) Oral at bedtime  clopidogrel Tablet 75 milliGRAM(s) Oral daily  dextrose 5%. 1000 milliLiter(s) (50 mL/Hr) IV Continuous <Continuous>  dextrose 50% Injectable 12.5 Gram(s) IV Push once  dextrose 50% Injectable 25 Gram(s) IV Push once  dextrose 50% Injectable 25 Gram(s) IV Push once  DULoxetine 60 milliGRAM(s) Oral daily  epoetin azalea Injectable 51198 Unit(s) IV Push once  furosemide    Tablet 20 milliGRAM(s) Oral <User Schedule>  heparin  Injectable 5000 Unit(s) SubCutaneous every 12 hours  heparin  Injectable. 1000 Unit(s) Dialysis. once  hydrALAZINE 25 milliGRAM(s) Oral three times a day  insulin detemir injectable (LEVEMIR) 4 Unit(s) SubCutaneous at bedtime  insulin lispro (HumaLOG) corrective regimen sliding scale   SubCutaneous three times a day before meals  insulin lispro (HumaLOG) corrective regimen sliding scale   SubCutaneous at bedtime  insulin lispro Injectable (HumaLOG) 2 Unit(s) SubCutaneous three times a day before meals  lisinopril 40 milliGRAM(s) Oral daily  metoprolol succinate ER 50 milliGRAM(s) Oral daily  multivitamin 1 Tablet(s) Oral daily  sevelamer carbonate 1600 milliGRAM(s) Oral three times a day with meals    MEDICATIONS  (PRN):  acetaminophen   Tablet .. 650 milliGRAM(s) Oral every 6 hours PRN Temp greater or equal to 38.5C (101.3F), Mild Pain (1 - 3)  dextrose 40% Gel 15 Gram(s) Oral once PRN Blood Glucose LESS THAN 70 milliGRAM(s)/deciliter  glucagon  Injectable 1 milliGRAM(s) IntraMuscular once PRN Glucose LESS THAN 70 milligrams/deciliter  insulin lispro Injectable (HumaLOG) 2 Unit(s) SubCutaneous at bedtime PRN snack  oxyCODONE    5 mG/acetaminophen 325 mG 1 Tablet(s) Oral every 6 hours PRN Moderate Pain (4 - 6)      Vital Signs Last 24 Hrs  T(C): 36.7 (21 May 2019 04:14), Max: 36.8 (20 May 2019 20:44)  T(F): 98 (21 May 2019 04:14), Max: 98.3 (20 May 2019 20:44)  HR: 70 (21 May 2019 04:14) (69 - 78)  BP: 137/71 (21 May 2019 04:14) (137/71 - 164/77)  BP(mean): --  RR: 18 (21 May 2019 04:14) (18 - 19)  SpO2: 97% (21 May 2019 04:14) (94% - 98%)      05-20-19 @ 07:01  -  05-21-19 @ 07:00  --------------------------------------------------------  IN: 700 mL / OUT: 0 mL / NET: 700 mL    05-21-19 @ 07:01  -  05-21-19 @ 13:05  --------------------------------------------------------  IN: 375 mL / OUT: 200 mL / NET: 175 mL        LABS:                        9.4    4.46  )-----------( 206      ( 21 May 2019 08:54 )             29.0     05-21    133<L>  |  92<L>  |  30<H>  ----------------------------<  71  4.6   |  29  |  5.93<H>    Ca    9.5      21 May 2019 06:20  Phos  4.5     05-20  Mg     2.2     05-20      IMAGING:  < from: [facility] Duplex Hemodialysis Access, Left (05.17.19 @ 18:32) >  There is a long-standing surgically constructed dialysis access fistula   created by the anastomosis of the cephalic vein to the brachial artery at   the level of the antecubital fossa.    There is a high velocity, low resistance pattern of antegrade flow, as   expected, through the left brachial artery proximal to the surgical   anastomosis.    There is atheromatous intimal thickening and irregularity along the   course of the inflow brachial artery.    There is dilatation of the egresscephalic vein.    There is arterialization with atheromatous plaque protruding into the   lumen along the course of the egress cephalic vein.    There is a flow-limiting stenosis of the of the egress cephalic vein in   the proximal upper arm with velocities measuring as high as 524/254 cm/s.    There is a stent central to the stenosis in the proximal upper arm with   in-stent thrombus. However, the measured velocities through the stent are   not elevated.    Impression: There is a patent left brachial to cephalic vein dialysis   access fistula.  There is a flow-limiting stenosis of the egress vein in the proximal   upper arm with velocities measuring as high as 524/254 cm/s.  There is a partially thrombosed stent in the egress cephalic vein central   to the stenosis in the proximal upper arm.    < end of copied text >

## 2019-05-21 NOTE — PROVIDER CONTACT NOTE (OTHER) - RECOMMENDATIONS
Re evaluate insulin regimine. Hypoglycemia protocol followed. 6oz applejuice administered x 2 and blood sugar came up to 113 after Q15 fingersticks x 2. Re evaluate insulin regimine.

## 2019-05-21 NOTE — PROGRESS NOTE ADULT - SUBJECTIVE AND OBJECTIVE BOX
Patient is a 63y old  Female who presents with a chief complaint of edema, legs pain (21 May 2019 07:35)      SUBJECTIVE / OVERNIGHT EVENTS: Comfortable without new complaints.   Review of Systems  chest pain no  palpitations no  sob no  nausea no  headache no    MEDICATIONS  (STANDING):  aspirin enteric coated 81 milliGRAM(s) Oral daily  atorvastatin 40 milliGRAM(s) Oral at bedtime  clopidogrel Tablet 75 milliGRAM(s) Oral daily  dextrose 5%. 1000 milliLiter(s) (50 mL/Hr) IV Continuous <Continuous>  dextrose 50% Injectable 12.5 Gram(s) IV Push once  dextrose 50% Injectable 25 Gram(s) IV Push once  dextrose 50% Injectable 25 Gram(s) IV Push once  DULoxetine 60 milliGRAM(s) Oral daily  epoetin azalea Injectable 75467 Unit(s) IV Push once  furosemide    Tablet 20 milliGRAM(s) Oral <User Schedule>  heparin  Injectable 5000 Unit(s) SubCutaneous every 12 hours  heparin  Injectable. 1000 Unit(s) Dialysis. once  hydrALAZINE 25 milliGRAM(s) Oral three times a day  insulin detemir injectable (LEVEMIR) 4 Unit(s) SubCutaneous at bedtime  insulin lispro (HumaLOG) corrective regimen sliding scale   SubCutaneous three times a day before meals  insulin lispro (HumaLOG) corrective regimen sliding scale   SubCutaneous at bedtime  insulin lispro Injectable (HumaLOG) 2 Unit(s) SubCutaneous three times a day before meals  lisinopril 40 milliGRAM(s) Oral daily  metoprolol succinate ER 50 milliGRAM(s) Oral daily  multivitamin 1 Tablet(s) Oral daily  sevelamer carbonate 1600 milliGRAM(s) Oral three times a day with meals    MEDICATIONS  (PRN):  acetaminophen   Tablet .. 650 milliGRAM(s) Oral every 6 hours PRN Temp greater or equal to 38.5C (101.3F), Mild Pain (1 - 3)  dextrose 40% Gel 15 Gram(s) Oral once PRN Blood Glucose LESS THAN 70 milliGRAM(s)/deciliter  glucagon  Injectable 1 milliGRAM(s) IntraMuscular once PRN Glucose LESS THAN 70 milligrams/deciliter  insulin lispro Injectable (HumaLOG) 2 Unit(s) SubCutaneous at bedtime PRN snack  oxyCODONE    5 mG/acetaminophen 325 mG 1 Tablet(s) Oral every 6 hours PRN Moderate Pain (4 - 6)      Vital Signs Last 24 Hrs  T(C): 36.7 (21 May 2019 04:14), Max: 36.8 (20 May 2019 20:44)  T(F): 98 (21 May 2019 04:14), Max: 98.3 (20 May 2019 20:44)  HR: 70 (21 May 2019 04:14) (69 - 78)  BP: 137/71 (21 May 2019 04:14) (137/71 - 164/77)  BP(mean): --  RR: 18 (21 May 2019 04:14) (18 - 19)  SpO2: 97% (21 May 2019 04:14) (94% - 98%)    PHYSICAL EXAM:  GENERAL: NAD  HEAD:  Atraumatic, Normocephalic  EYES: EOMI, PERRLA, conjunctiva and sclera clear  NECK: Supple, No JVD  CHEST/LUNG: Clear to auscultation bilaterally; No wheeze  HEART: Regular rate and rhythm; No murmurs, rubs, or gallops  ABDOMEN: Soft, Nontender, Nondistended; Bowel sounds present  EXTREMITIES:  2+ Peripheral Pulses, No clubbing, cyanosis, or edema  NEUROLOGY: non-focal  SKIN: No rashes or lesions    LABS:                        9.4    4.46  )-----------( 206      ( 21 May 2019 08:54 )             29.0     05-21    133<L>  |  92<L>  |  30<H>  ----------------------------<  71  4.6   |  29  |  5.93<H>    Ca    9.5      21 May 2019 06:20  Phos  4.5     05-20  Mg     2.2     05-20      PT/INR - ( 21 May 2019 09:16 )   PT: 11.1 sec;   INR: 0.98 ratio         PTT - ( 21 May 2019 09:16 )  PTT:30.4 sec            RADIOLOGY & ADDITIONAL TESTS:    Imaging Personally Reviewed:    Consultant(s) Notes Reviewed:      Care Discussed with Consultants/Other Providers:

## 2019-05-21 NOTE — PROGRESS NOTE ADULT - ASSESSMENT
62 y/o Female PMH CAD Sp PTCA w stents CHF,COPD, CVA, DMT1, ESRD on HD (M,Tu,Th,Sa), Gout, HLD, PVD, Sublcavian Stensosis (L) sp stent. PSH ASD Repair,idania,fem-pop bypass. Last dc from hospt approx 2wk ago now comes to ED via ems complains of lower extr swelling maribell with pain in ER k > 9 repeated K 8.3.  and pt with severe hyperglycemia as well as high AGAP on presentation     1- hyperkalemia resolved  2- chf  3- hyperglycemia  4-  htn   5- shpt   6- htn     hd 3.5 hr 2 k bath 2 liter bfr 400 dfr 600
63 f with    ESRD  - HD  - Nephrology evaluation Dr. Schmidt   - AVF duplex pending   - Vascular evaluation Dr. Tanner     DKA/ Diabetes type 1 control resolving  - endocrine follow    CAD  - stable  - cardiology evaluation Dr. Biswas    CHF (congestive heart failure) EF 40-45%  - cardiology evaluation    COPD (chronic obstructive pulmonary disease)   - nebs    CVA (cerebral infarction)  - supportive care     Depression  - continue Rx    Gout  - continue Rx    Hyperlipidemia   - continue Rx    Peripheral vascular disease occluded left fem-pop bypass 5/2015  - stable    Pierce Viera MD pager [phone]
62 y/o F w/ uncontrolled Type 1 DM w/ hypo and hyperglycemia at home previously on insulin pump admitted with LE edema and pain w/ hyperglycemia
62 y/o F w/ uncontrolled Type 1 DM w/ESRD on HD, neuropathy, CAD, CVA, CHF and multiple cardiac stents,  w/ hypo and hyperglycemia at home previously on insulin pump admitted with LE edema and pain w/ hyperglycemia. Tolerating POs w/glucose levels mostly at goal while inpatient. BG goal (100-200mg/dl). Pt at high risk for hypoglycemia given erratic PO intake based on past hospital visits.
62 y/o F w/ uncontrolled Type 1 DM w/ESRD on HD, neuropathy, CAD, CVA, CHF and multiple cardiac stents, w/ hypo and hyperglycemia at home previously on insulin pump admitted with LE edema and pain w/ hyperglycemia. Possible discharge home today after HD and f/u as out pt for vascular intervention needed to correct  LUE AV fistula venous stenosis of the fistula and a partially thrombosed subclavian stent. Tolerating POs with variable and poor PO intake. Hypoglycemic x2 in the last 24 hours while on present insulin doses. Will decrease insulin accordingly in order to prevent further hypoglycemia. Going home today. Spoke to pt about adjustments needed in her insulin regimen. Pt states she adjusts insulin doses according to BG and PO intake anything between 3-5 units of Levemir and  2-4 units of Humalog ac meals.  Pt has endo follow up as out pt.
62 y/o Female PMH CAD Sp PTCA w stents CHF,COPD, CVA, DMT1, ESRD on HD (M,Tu,Th,Sa), Gout, HLD, PVD, Sublcavian Stensosis (L) sp stent. PSH ASD Repair,idania,fem-pop bypass. Last dc from hospt approx 2wk ago now comes to ED via ems complains of lower extr swelling maribell with pain in ER k > 9 repeated K 8.3.  and pt with severe hyperglycemia as well as high AGAP on presentation     1- hyperkalemia  2- chf  3- hyperglycemia  4-  htn       imp/suggest: ESRD      Hemodialysis Prescription:  	Access: avf   	Dialyzer: revaclear 300  	Blood Flow (mL/Min): 400  	Dialysate Flow (mL/Min): 600  	Target UF (Liters): 2 liter  	Treatment Time: 3. h  	Potassium:  2  	Calcium: 2.5  	  YOLANDA epogen 96018 U     Vitamin D     continue with hd   see hd flow sheet
62 y/o Female PMH CAD Sp PTCA w stents CHF,COPD, CVA, DMT1, ESRD on HD (M,Tu,Th,Sa), Gout, HLD, PVD, Sublcavian Stensosis (L) sp stent. PSH ASD Repair,idania,fem-pop bypass. Last dc from hospt approx 2wk ago now comes to ED via ems complains of lower extr swelling maribell with pain in ER k > 9 repeated K 8.3.  and pt with severe hyperglycemia as well as high AGAP on presentation     1- hyperkalemia resolved  2- chf  3- hyperglycemia  4-  htn   5- shpt   6- htn       k in range  chf is compensated  hd am   vascular called for avf stenosis   cont lisinopril 40 mg   renvela 1 tab with meals
63 f with    ESRD  - HD  - Nephrology evaluation Dr. Schmidt    DKA/ Diabetes type 1 control resolving  - endocrine evaluation noted    CAD  - stable  - cardiology evaluation Dr. Biswas    CHF (congestive heart failure) EF 40-45%  - cardiology evaluation    COPD (chronic obstructive pulmonary disease)   - nebs    CVA (cerebral infarction)  - supportive care     Depression  - continue Rx    Gout  - continue Rx    Hyperlipidemia   - continue Rx    Peripheral vascular disease occluded left fem-pop bypass 5/2015  - stable    Further action as per clinical course   Pierce Viera MD pager [phone]
63 f with    ESRD  - HD  - Nephrology follow Dr. Schmidt     AV fistula stenosis & partially thrombosed subclavian stent.  - Vascular evaluation Dr. Tanner   - cath pending for revascularization    DKA/ Diabetes type 1 control resolving  - endocrine follow    CAD  - stable  - cardiology evaluation Dr. Biswas    CHF (congestive heart failure) EF 40-45%  - cardiology evaluation    COPD (chronic obstructive pulmonary disease)   - nebs    CVA (cerebral infarction)  - supportive care     Depression  - continue Rx    Gout  - continue Rx    Hyperlipidemia   - continue Rx    Peripheral vascular disease occluded left fem-pop bypass 5/2015  - stable    Pierce Viera MD pager [phone]
63 f with    ESRD  - HD  - Nephrology follow Dr. Schmidt     AV fistula stenosis & partially thrombosed subclavian stent.  - d/w Vascular Dr. Tanner . Unable to get OR time. Will repair AVF in his office in am.    DKA/ Diabetes type 1 control resolving  - endocrine follow    CAD  - stable  - cardiology evaluation Dr. Biswas    CHF (congestive heart failure) EF 40-45%  - cardiology evaluation    COPD (chronic obstructive pulmonary disease)   - nebs    CVA (cerebral infarction)  - supportive care     Depression  - continue Rx    Gout  - continue Rx    Hyperlipidemia   - continue Rx    Peripheral vascular disease occluded left fem-pop bypass 5/2015  - stable    DC home after HD. Follow with Vascular in am/ Nephrology/ PMD/ Cardiology/ Endocrine. QA    Pierce Viera MD pager [phone]
63 f with    ESRD  - HD  - Nephrology follow Dr. Schmidt   - AVF duplex result pending   - Vascular evaluation Dr. Tanner     DKA/ Diabetes type 1 control resolving  - endocrine follow    CAD  - stable  - cardiology evaluation Dr. Biswas    CHF (congestive heart failure) EF 40-45%  - cardiology evaluation    COPD (chronic obstructive pulmonary disease)   - nebs    CVA (cerebral infarction)  - supportive care     Depression  - continue Rx    Gout  - continue Rx    Hyperlipidemia   - continue Rx    Peripheral vascular disease occluded left fem-pop bypass 5/2015  - stable    Pierce Viera MD pager [phone]
64 y/o F w/ uncontrolled Type 1 DM w/ESRD on HD, neuropathy, CAD, CVA, CHF and multiple cardiac stents,  w/ hypo and hyperglycemia at home previously on insulin pump admitted with LE edema and pain w/ hyperglycemia. BG elevated today after insulin at dinner incorrectly held w/resulting rebound hyperglycemia. Discussed case w/medicine NP. BG goal (100-200mg/dl). Pt at high risk for hypoglycemia given erratic PO intake based on past hospital visits. Would favor to reduce premeal dose if eating if fearful of making pt hypoglycemic rather than holding premeal as this will predispose patient to DKA.
64 y/o F w/ uncontrolled Type 1 DM w/ESRD on HD, neuropathy, CAD, CVA, CHF and multiple cardiac stents, w/ hypo and hyperglycemia at home previously on insulin pump admitted with LE edema and pain w/ hyperglycemia. Tolerating POs with glycemic control improving while on present insulin regimen. No hypoglycemia. Possible discharge home today but vascular team called for consult due to positive ultrasound of LUE AV fistula with notable venous stenosis of the fistula and a partially thrombosed subclavian stent. Will need intervention prior discharge.
64 y/o Female PMH CAD Sp PTCA w stents CHF,COPD, CVA, DMT1, ESRD on HD (M,Tu,Th,Sa), Gout, HLD, PVD, Sublcavian Stensosis (L) sp stent. PSH ASD Repair,idania,fem-pop bypass. Last dc from hospt approx 2wk ago now comes to ED via ems complains of lower extr swelling maribell with pain in ER k > 9 repeated K 8.3.  and pt with severe hyperglycemia as well as high AGAP on presentation     1- hyperkalemia  2- chf  3- hyperglycemia  4-  htn       imp/suggest: ESRD      Hemodialysis Prescription:  	Access: avf   	Dialyzer: revaclear 300  	Blood Flow (mL/Min): 400  	Dialysate Flow (mL/Min): 600  	Target UF (Liters): 2 liter  	Treatment Time: 3.5 h  	Potassium:  2  	Calcium: 2.5  	  YOLANDA    Vitamin D     continue with hd   see hd flow sheet          awaiting avf duplex   Dr. Elizalde to see pt
Patient is a 63F PMH CAD c/b MI x8 & stents x7, CHF, COPD, CVA, DMT1, ESRD on HD (M,Tu,Th,Sa), Gout, HLD, PVD, Subclavian Stenosis (L) sp stent who presented with lower extremity edema, and has now been found to have a LUE AV fistula stenosis & partially thrombosed subclavian stent.     PLAN:      - Discussed with primary team. Patient to obtain HD today then discharged home.   - Follow up with Dr. Elizalde for LUE AV fistulagram as outpatient  - Patient should call office to schedule appointment.  - appreciate primary team care  - Please notify vascular sx team if any changes in patient's status    Antione Rodriguez MD  PGY-1  Vascular Sx [phone]

## 2019-05-21 NOTE — PROGRESS NOTE ADULT - SUBJECTIVE AND OBJECTIVE BOX
Diabetes Follow up note: Saw pt earlier today  Interval Hx: 62 yo woman with uncontrolled type 1 diabetes (well known from multiple prior admissions) DM c/b ESRD on HD, neuropathy, CAD, CVA, CHF and multiple cardiac stents presents w/LE edema and SOB. Pt reports feeling better with LE edema and pain improved. No further SOB noted. Vascular team called for positive ultrasound of LUE AV fistula with notable venous stenosis of the fistula and a partially thrombosed subclavian stent. Per vascular team pt needs surgical procedure for AVF findings. However, procedure has been deferred to out pt and she will be discharged home today after HD is done and will f/u with vascular as out pt. Pt reports tolerating POs but states having poor appetite> dislikes hospital food and states she is worried about her AVF. Noted that pt was also hypoglycemic this am even though Lantus dose was changed to Levemir last night. Pt denies feeling any symptoms of hypoglycemia. Attributes hypoglycemic events to poor PO intake. Meal time Humalog dose held this am with rebound hyperglycemia after holding Humalog dose and pt eating.       Review of Systems:  General: as above.   GI: Tolerating POs without any N/V/D/ABD PAIN.  CV: No CP/SOB  ENDO: No S&Sx of hypoglycemia    MEDS:  atorvastatin 40 milliGRAM(s) Oral at bedtime  insulin lispro (HumaLOG) corrective regimen sliding scale   SubCutaneous at bedtime  insulin lispro Injectable (HumaLOG) 2 Unit(s) SubCutaneous three times a day before meals  insulin detemir injectable (LEVEMIR) 6 Unit(s) SubCutaneous at bedtime    Allergies    No Known Allergies      PE:  General: Female sitting at edge of bed in NAD.   Vital Signs Last 24 Hrs  T(C): 36.6 (05-21-19 @ 12:47), Max: 36.8 (05-20-19 @ 20:44)  T(F): 97.9 (05-21-19 @ 12:47), Max: 98.3 (05-20-19 @ 20:44)  HR: 71 (05-21-19 @ 12:47) (70 - 78)  BP: 104/65 (05-21-19 @ 12:47) (104/65 - 164/77)  BP(mean): --  RR: 17 (05-21-19 @ 12:47) (17 - 19)  SpO2: 97% (05-21-19 @ 12:47) (94% - 97%)  Abd: Soft, NT, ND   Extremities: Warm. 1+ B/L LE edema L>R improving  Neuro: A&O X3    LABS:  POCT Blood Glucose.: 211 mg/dL (05-21-19 @ 11:28)  POCT Blood Glucose.: 113 mg/dL (05-21-19 @ 08:17)  POCT Blood Glucose.: 74 mg/dL (05-21-19 @ 08:00)  POCT Blood Glucose.: 57 mg/dL (05-21-19 @ 07:43)  POCT Blood Glucose.: 58 mg/dL (05-21-19 @ 07:42)  POCT Blood Glucose.: 114 mg/dL (05-21-19 @ 02:14)  POCT Blood Glucose.: 129 mg/dL (05-20-19 @ 21:01)  POCT Blood Glucose.: 105 mg/dL (05-20-19 @ 17:09)  POCT Blood Glucose.: 48 mg/dL (05-20-19 @ 16:51)  POCT Blood Glucose.: 48 mg/dL (05-20-19 @ 16:34)  POCT Blood Glucose.: 48 mg/dL (05-20-19 @ 16:33)  POCT Blood Glucose.: 90 mg/dL (05-20-19 @ 11:35)  POCT Blood Glucose.: 134 mg/dL (05-20-19 @ 07:48)  POCT Blood Glucose.: 124 mg/dL (05-19-19 @ 21:00)  POCT Blood Glucose.: 202 mg/dL (05-19-19 @ 16:25)                          9.4    4.46  )-----------( 206      ( 21 May 2019 08:54 )             29.0     05-21    133<L>  |  92<L>  |  30<H>  ----------------------------<  71  4.6   |  29  |  5.93<H>    Ca    9.5      21 May 2019 06:20  Phos  4.5     05-20  Mg     2.2     05-20      Hemoglobin A1C, Whole Blood: 8.8 % <H> [4.0 - 5.6] (04-30-19 @ 08:58)  Hemoglobin A1C, Whole Blood: 7.9 % <H> [4.0 - 5.6] (02-27-19 @ 11:08)

## 2019-05-21 NOTE — DISCHARGE NOTE NURSING/CASE MANAGEMENT/SOCIAL WORK - NSDCPEPTSTRK_GEN_ALL_CORE
Signs and symptoms of stroke/Stroke support groups for patients, families, and friends/Stroke warning signs and symptoms/Need for follow up after discharge/Prescribed medications/Stroke education booklet/Risk factors for stroke/Call 911 for stroke

## 2019-05-21 NOTE — PROVIDER CONTACT NOTE (OTHER) - ACTION/TREATMENT ORDERED:
Continue monitoring, K at 5.5, NP made aware.
Patient given 4 oz apple juice x 2 and blood glucose came up to 105. Blood glucose checked every 15 minutes x 2. Hypoglycemia protocol followed.
Will speak with endocrine.

## 2019-05-21 NOTE — PROGRESS NOTE ADULT - PROVIDER SPECIALTY LIST ADULT
Cardiology
Cardiology
Endocrinology
Internal Medicine
Nephrology
Vascular Surgery
Cardiology
Nephrology
Internal Medicine

## 2019-05-21 NOTE — PROVIDER CONTACT NOTE (OTHER) - BACKGROUND
Patient had hypoglycemic episode prior to dinner yesterday in the 40s. Endocrine involved and lowered Levemir and premeal humalog.

## 2019-05-21 NOTE — PROGRESS NOTE ADULT - SUBJECTIVE AND OBJECTIVE BOX
Cardiovascular Disease Progress Note    Overnight events: No acute events overnight. awaiting AV fistula repair with dr dennis. no new cardiac sx  Otherwise review of systems negative    Objective Findings:  T(C): 36.7 (05-21-19 @ 04:14), Max: 36.8 (05-20-19 @ 20:44)  HR: 70 (05-21-19 @ 04:14) (69 - 78)  BP: 137/71 (05-21-19 @ 04:14) (137/71 - 164/77)  RR: 18 (05-21-19 @ 04:14) (17 - 19)  SpO2: 97% (05-21-19 @ 04:14) (94% - 98%)  Wt(kg): --  Daily     Daily       Physical Exam:  Gen: NAD  HEENT: EOMI  CV: RRR, normal S1 + S2, 2/6 heraclio  Lungs: CTAB  Abd: soft, non-tender  Ext: No edema    Telemetry: nsr    Laboratory Data:    05-21    133<L>  |  92<L>  |  30<H>  ----------------------------<  71  4.6   |  29  |  5.93<H>    Ca    9.5      21 May 2019 06:20  Phos  4.5     05-20  Mg     2.2     05-20                Inpatient Medications:  MEDICATIONS  (STANDING):  aspirin enteric coated 81 milliGRAM(s) Oral daily  atorvastatin 40 milliGRAM(s) Oral at bedtime  clopidogrel Tablet 75 milliGRAM(s) Oral daily  dextrose 5%. 1000 milliLiter(s) (50 mL/Hr) IV Continuous <Continuous>  dextrose 50% Injectable 12.5 Gram(s) IV Push once  dextrose 50% Injectable 25 Gram(s) IV Push once  dextrose 50% Injectable 25 Gram(s) IV Push once  DULoxetine 60 milliGRAM(s) Oral daily  furosemide    Tablet 20 milliGRAM(s) Oral <User Schedule>  heparin  Injectable 5000 Unit(s) SubCutaneous every 12 hours  hydrALAZINE 25 milliGRAM(s) Oral three times a day  insulin detemir injectable (LEVEMIR) 6 Unit(s) SubCutaneous at bedtime  insulin lispro (HumaLOG) corrective regimen sliding scale   SubCutaneous three times a day before meals  insulin lispro (HumaLOG) corrective regimen sliding scale   SubCutaneous at bedtime  insulin lispro Injectable (HumaLOG) 2 Unit(s) SubCutaneous three times a day before meals  lisinopril 40 milliGRAM(s) Oral daily  metoprolol succinate ER 50 milliGRAM(s) Oral daily  multivitamin 1 Tablet(s) Oral daily  sevelamer carbonate 1600 milliGRAM(s) Oral three times a day with meals      Assessment:  -leg pain  -hyperkalemia  -recurrent DKA  -volume overload  -ischemic cardiomyopathy, cad s/p multiple stents  -esrd on hd  -PAD s/p prior intervention   -malfunctioning AV fistula      Recs:  -leg pain, hx of PAD. sx not consistent with CLI. most likely neuropathic - consider trial of gabapentin. no obvious infection. possibly related to edema 2/2 volume overload. c/w HD. further management of PAD as outpatient  -awaiting avf repair. she remains medically optimized without any ischemic or hf sx. stable valvular pathology. can proceed without further cardiac work up  -volume overload on admission --> c/w HD to maintain euvolemia  -hyperglycemia --> f/u endo. improved  -ischemic cardiomyopathy s/p LHC with Dr Vela 2/20 --> severe and diffuse instent restenosis along RCA --> unable to stent due to multiple layers of stenting and prior brachytherapy. we can consider complex intervention if true ischemic sx develop. c/w medical treatment with anti-platelet, statins and anti-anginals for now.  c/w metop succinate 50mg daily for anti-anginal effect and for pAT/NSVT  -c/w beta blockers for nsvt/pat/cad (as above)  -hx of prolonged qtc. avoid qtc prolonging meds  -s/p recent TTE: mod-severe MR, pseudo AS (low flow-low gradient), mod-severe LV dysfunction --> recent CTS consult with dr hebert appreciated. plan is for medical management given surgical risk and patient preference  -c/w asa, plavix, statin for ischemic cardiomyopathy/CAD/PAD  -dvt ppx        Over 25 minutes spent on total encounter; more than 50% of the visit was spent counseling and/or coordinating care by the attending physician.      Julián Biswas MD   Cardiovascular Disease  [phone]

## 2019-05-21 NOTE — DISCHARGE NOTE NURSING/CASE MANAGEMENT/SOCIAL WORK - NSDCDPATPORTLINK_GEN_ALL_CORE
You can access the [facility] Portal, offered by [facility], by registering with the following website: [email or web address]

## 2019-05-21 NOTE — PROGRESS NOTE ADULT - PROBLEM SELECTOR PLAN 1
-test BG AC/HS  -Decreased Levemir to 4 units q hs  -C/w Humalog 2 units AC meals. HOLD IF NOT EATING!!  -c/w Humalog low correction scale AC and Low HS scale  Can be discharged on basal/bolus on Levemir 4 units qhs and Humalog 2 units ac meals. Pt knows how to adjust doses at home.  -f/u outpt w/private endo Pina Ley  -Plan discussed with pt/team. If NPO stays for fistula procedure give Levemir 3 units at hs instead of 4 units.    Contact info: [phone] (24/7). pager [phone]

## 2019-05-21 NOTE — PROGRESS NOTE ADULT - PROBLEM SELECTOR PLAN 2
- Pt instructed to refuse premeal insulin if not eating.  -Levemir dose decrease as well.   -Plan discussed with pt/team.  Contact info: [phone] (24/7). pager [phone]
-Spoke to RN this pm about premeal insulin dosing for BG <100s. However, Rn reported pt's  stated that pt only needs 2 units of Humalog since this is the dose she takes at home. Pt noted ealrier today she takes 3 units at home.  will preventively decrease to 2 units ac meals for now.

## 2019-05-21 NOTE — PROGRESS NOTE ADULT - REASON FOR ADMISSION
edema, legs pain

## 2019-05-22 NOTE — ED ADULT NURSE NOTE - NS ED NURSE LEVEL OF CONSCIOUSNESS MENTAL STATUS
Medication requested: Vyvanse 30mg    Pharmacy requested: [facility] on [address]    Last office visit: 3/1/2019    Next office visit: 8/30/2019    Last refill of medication: 4/19/2019    PDMP not reviewed.    Routed to Dr. Michaels for review.   Awake

## 2019-05-31 ENCOUNTER — APPOINTMENT (OUTPATIENT)
Dept: PULMONOLOGY | Facility: CLINIC | Age: 64
End: 2019-05-31
Payer: MEDICARE

## 2019-05-31 VITALS — OXYGEN SATURATION: 96 % | HEART RATE: 80 BPM | SYSTOLIC BLOOD PRESSURE: 135 MMHG | DIASTOLIC BLOOD PRESSURE: 75 MMHG

## 2019-05-31 PROCEDURE — 94060 EVALUATION OF WHEEZING: CPT

## 2019-05-31 PROCEDURE — 99213 OFFICE O/P EST LOW 20 MIN: CPT | Mod: 25

## 2019-05-31 NOTE — REVIEW OF SYSTEMS
[As Noted in HPI] : as noted in HPI [Edema] : ~T edema was present [Claudication] : intermittent claudication [Diabetes] : diabetes mellitus [Negative] : Neurologic [Chest Discomfort] : no chest discomfort [PND] : no PND [Orthopnea] : no orthopnea [FreeTextEntry8] : POST EBUS with Med Bx negative flow cytology/ LN pathology Reactive  [FreeTextEntry9] : CHF ASHD [de-identified] : s/p DKA hospital admission

## 2019-05-31 NOTE — DISCUSSION/SUMMARY
[FreeTextEntry1] : COPD\par Congestive heart failure\par Renal failure on dialysis\par DM with hx hypoglycemia and DKA\par \par March 6, 2019\par Addendum\par Underwent bronchoscopy E bus mediastinoscopy\par AFB tissue culture negative\par Flow cytometry no diagnostic abnormalities\par \par CXR\par Small possibly partially loculated right pleural effusion with likely associated passive atelectasis\par Resolution of prior reported bilateral increased reticular opacities and more confluent right-sided opacities felt a reflection of improvement of pulmonary edema\par Residual increased reticular opacities at the right base possibly pulmonary vascular congestion or subsegmental atelectasis\par \par Post cardiothoracic surgery evaluation\par As noted pathology post E bus hysteroscopy March 14, 2019 was negative for tumor\par CT chest done while hospitalized for a fall at [place] on April 18, 2019 findings suggested mild pulmonary edema\par Decrease in size of the mediastinal adenopathy compared to prior exam\par Agree with cardiothoracic surgery 3-month follow-up CAT scan of the chest

## 2019-05-31 NOTE — HISTORY OF PRESENT ILLNESS
[None] : ~He/She~ has no significant interval events [Difficulty Breathing During Exertion] : stable dyspnea on exertion [Feelings Of Weakness On Exertion] : stable exercise intolerance [Cough] : denies coughing [Wheezing] : denies wheezing [Regional Soft Tissue Swelling Both Lower Extremities] : stable lower extremity edema [Chest Pain Or Discomfort] : denies chest pain [Fever] : denies fever [Former] : is a former smoker [___ Year Quit] : ~He/She~ quit smoking in [unfilled] [Wt Gain ___ Lbs] : no recent weight gain [Wt Loss ___ Lbs] : no recent weight loss [Oxygen] : the patient uses no supplemental oxygen [FreeTextEntry1] : Status post E bus and mediastinoscopy cardiothoracic surgery to rule out lymphoma, rule out lung cancer with non diagnostic data\par History severe CHF\par Renal failure on hemodialysis\par Diabetes mellitus with history of recurrent episodes of DKA requiring hospitalization\par ASD\par Atherosclerotic heart disease \par pleural effusions secondary to congestive heart failure cardiac disease with fluid overload\par COPD\par Psychiatric disorder

## 2019-05-31 NOTE — PROCEDURE
[FreeTextEntry1] :  Spirometry 5/31 2019\par moderate OAD\par 12 % BD\par \par End-tidal CO2 34\par \par Chest x-ray PA lateral projection January 11, 2019\par Cardiomegaly\par Left pleural effusion pleural fibrosis thickening\par Right lung upper zone rib fracture\par No evidence of active CHF\par No Tasha B-lines cephalization\par No dominant pulmonary nodules appreciated\par \par PFT January 11, 2019\par Moderate reduction in flow rates with an obstructive impairment\par Lung volumes consistent with air trapping with RV/TLC ratio 152% of predicted.\par Diffusion 68% of predicted with a loss of mild functioning alveolar capillary units\par Chronically low hemoglobin noted today at 8.9

## 2019-06-01 ENCOUNTER — INPATIENT (INPATIENT)
Facility: HOSPITAL | Age: 64
LOS: 2 days | Discharge: HOME | DRG: 917 | End: 2019-06-04
Attending: INTERNAL MEDICINE | Admitting: INTERNAL MEDICINE
Payer: MEDICARE

## 2019-06-01 VITALS
RESPIRATION RATE: 16 BRPM | TEMPERATURE: 98 F | OXYGEN SATURATION: 97 % | HEART RATE: 80 BPM | DIASTOLIC BLOOD PRESSURE: 61 MMHG | SYSTOLIC BLOOD PRESSURE: 111 MMHG | WEIGHT: 110.01 LBS

## 2019-06-01 DIAGNOSIS — E16.2 HYPOGLYCEMIA, UNSPECIFIED: ICD-10-CM

## 2019-06-01 DIAGNOSIS — Z98.89 OTHER SPECIFIED POSTPROCEDURAL STATES: Chronic | ICD-10-CM

## 2019-06-01 LAB
ALBUMIN SERPL ELPH-MCNC: 4 G/DL — SIGNIFICANT CHANGE UP (ref 3.3–5)
ALP SERPL-CCNC: 72 U/L — SIGNIFICANT CHANGE UP (ref 40–120)
ALT FLD-CCNC: 15 U/L — SIGNIFICANT CHANGE UP (ref 10–45)
ANION GAP SERPL CALC-SCNC: 16 MMOL/L — SIGNIFICANT CHANGE UP (ref 5–17)
AST SERPL-CCNC: 19 U/L — SIGNIFICANT CHANGE UP (ref 10–40)
BASOPHILS # BLD AUTO: 0 K/UL — SIGNIFICANT CHANGE UP (ref 0–0.2)
BASOPHILS NFR BLD AUTO: 0.6 % — SIGNIFICANT CHANGE UP (ref 0–2)
BILIRUB SERPL-MCNC: 0.2 MG/DL — SIGNIFICANT CHANGE UP (ref 0.2–1.2)
BUN SERPL-MCNC: 41 MG/DL — HIGH (ref 7–23)
CALCIUM SERPL-MCNC: 10.6 MG/DL — HIGH (ref 8.4–10.5)
CHLORIDE SERPL-SCNC: 89 MMOL/L — LOW (ref 96–108)
CO2 SERPL-SCNC: 32 MMOL/L — HIGH (ref 22–31)
CREAT SERPL-MCNC: 4.93 MG/DL — HIGH (ref 0.5–1.3)
EOSINOPHIL # BLD AUTO: 0.2 K/UL — SIGNIFICANT CHANGE UP (ref 0–0.5)
EOSINOPHIL NFR BLD AUTO: 3.2 % — SIGNIFICANT CHANGE UP (ref 0–6)
ETHANOL SERPL-MCNC: SIGNIFICANT CHANGE UP MG/DL (ref 0–10)
GAS PNL BLDV: SIGNIFICANT CHANGE UP
GLUCOSE BLDC GLUCOMTR-MCNC: 154 MG/DL — HIGH (ref 70–99)
GLUCOSE BLDC GLUCOMTR-MCNC: 212 MG/DL — HIGH (ref 70–99)
GLUCOSE BLDC GLUCOMTR-MCNC: 283 MG/DL — HIGH (ref 70–99)
GLUCOSE BLDC GLUCOMTR-MCNC: 287 MG/DL — HIGH (ref 70–99)
GLUCOSE BLDC GLUCOMTR-MCNC: 341 MG/DL — HIGH (ref 70–99)
GLUCOSE BLDC GLUCOMTR-MCNC: 501 MG/DL — CRITICAL HIGH (ref 70–99)
GLUCOSE BLDC GLUCOMTR-MCNC: 510 MG/DL — CRITICAL HIGH (ref 70–99)
GLUCOSE SERPL-MCNC: 42 MG/DL — CRITICAL LOW (ref 70–99)
HCT VFR BLD CALC: 31.8 % — LOW (ref 34.5–45)
HGB BLD-MCNC: 10.6 G/DL — LOW (ref 11.5–15.5)
LYMPHOCYTES # BLD AUTO: 1 K/UL — SIGNIFICANT CHANGE UP (ref 1–3.3)
LYMPHOCYTES # BLD AUTO: 16.1 % — SIGNIFICANT CHANGE UP (ref 13–44)
MAGNESIUM SERPL-MCNC: 2.3 MG/DL — SIGNIFICANT CHANGE UP (ref 1.6–2.6)
MCHC RBC-ENTMCNC: 33.4 GM/DL — SIGNIFICANT CHANGE UP (ref 32–36)
MCHC RBC-ENTMCNC: 35.4 PG — HIGH (ref 27–34)
MCV RBC AUTO: 106 FL — HIGH (ref 80–100)
MONOCYTES # BLD AUTO: 0.9 K/UL — SIGNIFICANT CHANGE UP (ref 0–0.9)
MONOCYTES NFR BLD AUTO: 15 % — HIGH (ref 2–14)
NEUTROPHILS # BLD AUTO: 4 K/UL — SIGNIFICANT CHANGE UP (ref 1.8–7.4)
NEUTROPHILS NFR BLD AUTO: 65 % — SIGNIFICANT CHANGE UP (ref 43–77)
PHOSPHATE SERPL-MCNC: 5.8 MG/DL — HIGH (ref 2.5–4.5)
PLATELET # BLD AUTO: 243 K/UL — SIGNIFICANT CHANGE UP (ref 150–400)
POTASSIUM SERPL-MCNC: 5.2 MMOL/L — SIGNIFICANT CHANGE UP (ref 3.5–5.3)
POTASSIUM SERPL-SCNC: 5.2 MMOL/L — SIGNIFICANT CHANGE UP (ref 3.5–5.3)
PROT SERPL-MCNC: 6.5 G/DL — SIGNIFICANT CHANGE UP (ref 6–8.3)
RBC # BLD: 2.99 M/UL — LOW (ref 3.8–5.2)
RBC # FLD: 14 % — SIGNIFICANT CHANGE UP (ref 10.3–14.5)
SODIUM SERPL-SCNC: 137 MMOL/L — SIGNIFICANT CHANGE UP (ref 135–145)
WBC # BLD: 6.2 K/UL — SIGNIFICANT CHANGE UP (ref 3.8–10.5)
WBC # FLD AUTO: 6.2 K/UL — SIGNIFICANT CHANGE UP (ref 3.8–10.5)

## 2019-06-01 PROCEDURE — 99285 EMERGENCY DEPT VISIT HI MDM: CPT | Mod: GC,25

## 2019-06-01 PROCEDURE — 99223 1ST HOSP IP/OBS HIGH 75: CPT | Mod: GC

## 2019-06-01 PROCEDURE — 71045 X-RAY EXAM CHEST 1 VIEW: CPT | Mod: 26

## 2019-06-01 PROCEDURE — 93010 ELECTROCARDIOGRAM REPORT: CPT

## 2019-06-01 PROCEDURE — 74177 CT ABD & PELVIS W/CONTRAST: CPT | Mod: 26

## 2019-06-01 RX ORDER — INSULIN LISPRO 100/ML
3 VIAL (ML) SUBCUTANEOUS
Refills: 0 | Status: DISCONTINUED | OUTPATIENT
Start: 2019-06-01 | End: 2019-06-01

## 2019-06-01 RX ORDER — OXYCODONE AND ACETAMINOPHEN 5; 325 MG/1; MG/1
1 TABLET ORAL EVERY 6 HOURS
Refills: 0 | Status: DISCONTINUED | OUTPATIENT
Start: 2019-06-01 | End: 2019-06-04

## 2019-06-01 RX ORDER — FUROSEMIDE 40 MG
20 TABLET ORAL
Refills: 0 | Status: DISCONTINUED | OUTPATIENT
Start: 2019-06-01 | End: 2019-06-04

## 2019-06-01 RX ORDER — LISINOPRIL 2.5 MG/1
40 TABLET ORAL DAILY
Refills: 0 | Status: DISCONTINUED | OUTPATIENT
Start: 2019-06-01 | End: 2019-06-04

## 2019-06-01 RX ORDER — SODIUM CHLORIDE 9 MG/ML
1000 INJECTION, SOLUTION INTRAVENOUS
Refills: 0 | Status: DISCONTINUED | OUTPATIENT
Start: 2019-06-01 | End: 2019-06-04

## 2019-06-01 RX ORDER — SEVELAMER CARBONATE 2400 MG/1
1600 POWDER, FOR SUSPENSION ORAL
Refills: 0 | Status: DISCONTINUED | OUTPATIENT
Start: 2019-06-01 | End: 2019-06-04

## 2019-06-01 RX ORDER — INSULIN LISPRO 100/ML
VIAL (ML) SUBCUTANEOUS AT BEDTIME
Refills: 0 | Status: DISCONTINUED | OUTPATIENT
Start: 2019-06-01 | End: 2019-06-04

## 2019-06-01 RX ORDER — DEXTROSE 50 % IN WATER 50 %
50 SYRINGE (ML) INTRAVENOUS ONCE
Refills: 0 | Status: COMPLETED | OUTPATIENT
Start: 2019-06-01 | End: 2019-06-01

## 2019-06-01 RX ORDER — IPRATROPIUM/ALBUTEROL SULFATE 18-103MCG
3 AEROSOL WITH ADAPTER (GRAM) INHALATION EVERY 6 HOURS
Refills: 0 | Status: DISCONTINUED | OUTPATIENT
Start: 2019-06-01 | End: 2019-06-04

## 2019-06-01 RX ORDER — GLUCAGON INJECTION, SOLUTION 0.5 MG/.1ML
1 INJECTION, SOLUTION SUBCUTANEOUS ONCE
Refills: 0 | Status: DISCONTINUED | OUTPATIENT
Start: 2019-06-01 | End: 2019-06-04

## 2019-06-01 RX ORDER — ASPIRIN/CALCIUM CARB/MAGNESIUM 324 MG
81 TABLET ORAL DAILY
Refills: 0 | Status: DISCONTINUED | OUTPATIENT
Start: 2019-06-01 | End: 2019-06-04

## 2019-06-01 RX ORDER — INSULIN LISPRO 100/ML
2 VIAL (ML) SUBCUTANEOUS
Refills: 0 | Status: DISCONTINUED | OUTPATIENT
Start: 2019-06-01 | End: 2019-06-04

## 2019-06-01 RX ORDER — DEXTROSE 10 % IN WATER 10 %
1000 INTRAVENOUS SOLUTION INTRAVENOUS
Refills: 0 | Status: DISCONTINUED | OUTPATIENT
Start: 2019-06-01 | End: 2019-06-01

## 2019-06-01 RX ORDER — PANTOPRAZOLE SODIUM 20 MG/1
40 TABLET, DELAYED RELEASE ORAL
Refills: 0 | Status: DISCONTINUED | OUTPATIENT
Start: 2019-06-01 | End: 2019-06-04

## 2019-06-01 RX ORDER — ACETAMINOPHEN 500 MG
650 TABLET ORAL EVERY 6 HOURS
Refills: 0 | Status: DISCONTINUED | OUTPATIENT
Start: 2019-06-01 | End: 2019-06-04

## 2019-06-01 RX ORDER — INSULIN GLARGINE 100 [IU]/ML
4 INJECTION, SOLUTION SUBCUTANEOUS AT BEDTIME
Refills: 0 | Status: DISCONTINUED | OUTPATIENT
Start: 2019-06-01 | End: 2019-06-04

## 2019-06-01 RX ORDER — CLOPIDOGREL BISULFATE 75 MG/1
75 TABLET, FILM COATED ORAL DAILY
Refills: 0 | Status: DISCONTINUED | OUTPATIENT
Start: 2019-06-01 | End: 2019-06-04

## 2019-06-01 RX ORDER — DEXTROSE 50 % IN WATER 50 %
12.5 SYRINGE (ML) INTRAVENOUS ONCE
Refills: 0 | Status: DISCONTINUED | OUTPATIENT
Start: 2019-06-01 | End: 2019-06-04

## 2019-06-01 RX ORDER — DULOXETINE HYDROCHLORIDE 30 MG/1
60 CAPSULE, DELAYED RELEASE ORAL DAILY
Refills: 0 | Status: DISCONTINUED | OUTPATIENT
Start: 2019-06-01 | End: 2019-06-04

## 2019-06-01 RX ORDER — DEXTROSE 50 % IN WATER 50 %
15 SYRINGE (ML) INTRAVENOUS ONCE
Refills: 0 | Status: DISCONTINUED | OUTPATIENT
Start: 2019-06-01 | End: 2019-06-04

## 2019-06-01 RX ORDER — INSULIN GLARGINE 100 [IU]/ML
6 INJECTION, SOLUTION SUBCUTANEOUS AT BEDTIME
Refills: 0 | Status: DISCONTINUED | OUTPATIENT
Start: 2019-06-01 | End: 2019-06-01

## 2019-06-01 RX ORDER — HEPARIN SODIUM 5000 [USP'U]/ML
5000 INJECTION INTRAVENOUS; SUBCUTANEOUS EVERY 12 HOURS
Refills: 0 | Status: DISCONTINUED | OUTPATIENT
Start: 2019-06-01 | End: 2019-06-04

## 2019-06-01 RX ORDER — INSULIN LISPRO 100/ML
VIAL (ML) SUBCUTANEOUS
Refills: 0 | Status: DISCONTINUED | OUTPATIENT
Start: 2019-06-01 | End: 2019-06-03

## 2019-06-01 RX ORDER — DEXTROSE 50 % IN WATER 50 %
25 SYRINGE (ML) INTRAVENOUS ONCE
Refills: 0 | Status: DISCONTINUED | OUTPATIENT
Start: 2019-06-01 | End: 2019-06-04

## 2019-06-01 RX ORDER — HYDRALAZINE HCL 50 MG
25 TABLET ORAL THREE TIMES A DAY
Refills: 0 | Status: DISCONTINUED | OUTPATIENT
Start: 2019-06-01 | End: 2019-06-04

## 2019-06-01 RX ORDER — METOPROLOL TARTRATE 50 MG
50 TABLET ORAL DAILY
Refills: 0 | Status: DISCONTINUED | OUTPATIENT
Start: 2019-06-01 | End: 2019-06-04

## 2019-06-01 RX ADMIN — Medication 25 MILLIGRAM(S): at 23:36

## 2019-06-01 RX ADMIN — Medication 81 MILLIGRAM(S): at 14:48

## 2019-06-01 RX ADMIN — HEPARIN SODIUM 5000 UNIT(S): 5000 INJECTION INTRAVENOUS; SUBCUTANEOUS at 23:35

## 2019-06-01 RX ADMIN — Medication 2 UNIT(S): at 19:26

## 2019-06-01 RX ADMIN — CLOPIDOGREL BISULFATE 75 MILLIGRAM(S): 75 TABLET, FILM COATED ORAL at 14:48

## 2019-06-01 RX ADMIN — Medication 50 MILLILITER(S): at 03:51

## 2019-06-01 RX ADMIN — DULOXETINE HYDROCHLORIDE 60 MILLIGRAM(S): 30 CAPSULE, DELAYED RELEASE ORAL at 14:48

## 2019-06-01 RX ADMIN — SEVELAMER CARBONATE 1600 MILLIGRAM(S): 2400 POWDER, FOR SUSPENSION ORAL at 23:36

## 2019-06-01 RX ADMIN — OXYCODONE AND ACETAMINOPHEN 1 TABLET(S): 5; 325 TABLET ORAL at 18:44

## 2019-06-01 RX ADMIN — Medication 6: at 14:29

## 2019-06-01 RX ADMIN — Medication 25 MILLIGRAM(S): at 14:48

## 2019-06-01 RX ADMIN — INSULIN GLARGINE 4 UNIT(S): 100 INJECTION, SOLUTION SUBCUTANEOUS at 23:34

## 2019-06-01 RX ADMIN — Medication 1 TABLET(S): at 14:48

## 2019-06-01 RX ADMIN — Medication 3 MILLILITER(S): at 23:37

## 2019-06-01 RX ADMIN — Medication 100 MILLILITER(S): at 05:51

## 2019-06-01 RX ADMIN — OXYCODONE AND ACETAMINOPHEN 1 TABLET(S): 5; 325 TABLET ORAL at 19:39

## 2019-06-01 NOTE — ED PROVIDER NOTE - PROGRESS NOTE DETAILS
patient more awake and alert at this time, d10 gtt DCed, pt tolerating po -nick Pau: Daisy Schmidt (pt nephrologist) contact, he is aware pt. is here and needs dialysis.

## 2019-06-01 NOTE — ED PROVIDER NOTE - ATTENDING CONTRIBUTION TO CARE
63 yof poorly controlled type 1 DM recently switched to levemir from lantus qhs and humalog at meals on last admission and dsicharged on this regimen 10 days ago, cad, vasculopathy w pad, chf, copd, cva, esrd on HD 4 times weekly, gout, hld, PVD subclavian stenosis s/p stenting, fem pop bypass, presents today w recurrent hypoglycemia. [facility] show episodes of hypolgycemia as inpatient, attributed to low PO intake as pt didn't like the food.  as per EMS, fsg en route was 40, improved w d50 in the field. on arrival pt lethargic and unable to provide any additional hx. EMS reports pt and  told them pt took extra dose of long acting insulin at bedtime on accident     ros: unobtainable due to lethargy    Physical Exam:   constitutional - lethargic, arouses to tactile stim, disoriented   head - no external evidence of trauma  eent - no conjunctival injection or icterus, mucous membranes moist   cvs - rrr, no murmurs, no peripheral edema  resp - breath sounds clear and equal bilat, normal respiratory effort  gi - abdomen soft w mod to severe LLQ tenderness, no rigidity, guarding or rebound, bowel sounds present  msk - moving all extremities spontaneously, gait not tested   neuro - lethargic as above, moving all ext . pupils 2mm and reactive   skin- no jaundice, warm and dry  psych - unable to assess     found to be recurrently hypoglycemic on arrival despite amp of d50 w ems - second amp and dextrose and dextrose gtt started - despite improvement in sugar pt remained somewhat lethargic - question alternate cause for lethargy. will obtain infectious w/u, head ct, after more alert pt reports LLQ pain and nausea - will do ct a/p to assess for intraabd cause for recurrent hypoglycemia. endorsed to Dr Sun at 0700 pending imaging w plan for admission. pt due for HD today. SENG Garcia MD

## 2019-06-01 NOTE — ED ADULT NURSE NOTE - NSSUHOSCREENINGYN_ED_ALL_ED
No - the patient is unable to be screened due to medical condition Yes - the patient is able to be screened

## 2019-06-01 NOTE — H&P ADULT - HISTORY OF PRESENT ILLNESS
64 yo female presenting with hypoglycemia from home.  found to have fingerstick in the 40s per ems, improved with glucose paste to 80s.  Per EMS, patient took double her dose of long acting insulin by accident.  patient endorses same thing.  also complaining of abdominal pain predominantly in the llq.  unable to quantify or qualify pain.

## 2019-06-01 NOTE — CONSULT NOTE ADULT - SUBJECTIVE AND OBJECTIVE BOX
HPI:63 y.o. female with h/o uncontrolled Type 1 DM (diagnosed at age 20) c/b neuropathy, retinopathy, ESRD on HD with CAD s/p PCI, TIA, CHF and lung lesion admitted for hyperglycemia and LE edema. Patient had recent cardiac cath in Feb 2019 with no intervention. Patient follows with endocrinologist Dr Ley. Patient takes Lantus 3-5 U at night and Humalog 3U before meals. States glucose values are extremely variable with hyper and hypoglycemia without a pattern. [facility] Is UTD with opthalmology and goes every 3 months. On HD 4 days per week. No podiatry. States she tries to moderate her intake of carbs. Patient used to be on an insulin pump in the past but was not able to manage it appropriately.   Also hx of CAD Sp PTCA w stents CHF, COPD, CVA, Gout, HLD, PVD, Sublcavian Stensosis (L) sp stent. PSH ASD Repair,idania,fem-pop bypass. Last dc from hospt approx 2wk ago now comes to ED via ems complains of lower extr swelling maribell with pain. EMS report pt in mod pain necessitating fentanyl and that pt seemed very sleepy. Pt comes to ed complains of maribell lower extr pain onset yesterday "real bad". Pt has reported le pain in past without clear dx according to pt. No hx trauma. Pain improves with ambulation.       PAST MEDICAL & SURGICAL HISTORY:  COPD (chronic obstructive pulmonary disease)  Localized enlarged lymph nodes  CHF (congestive heart failure): EF 40-45%  Subclavian vein stenosis, left: s/p stent  DKA, type 1: 1/2015  ACS (acute coronary syndrome): 1/2015 - cath revealed 100% ostial stenosis not amenable to PCI - medical management  TIA (transient ischemic attack): x 2 - 8-9 years ago prior to ASD/VSD repair  CAD (coronary artery disease): s/p stents  Gout: past  CVA (cerebral infarction): with no residual, 8 yrs ago, prior to heart surgery - ST memory loss  Peripheral vascular disease: occluded left fem-pop bypass 5/2015  Diabetes mellitus type 1: Insulin Dependent -  ESRD (end stage renal disease): dialysis  M, tue, thursday, saturday  Hyperlipidemia  Status post device closure of ASD: &quot;clamshell&quot;  History of cardiac catheterization: 1/2015 - no intervention  S/P femoral-popliteal bypass surgery: L and R in 2013 with graft; 5/2015 CFA angioplasty left and ileofemoral endarterectomywith vein patch angioplasty of left fem-pop bypass graft  Multiple vascular surgery both leg, left fempop bypass revision 11/2015  AV (arteriovenous fistula): Left AV graft; revision with stent placement 2-3 years ago  S/P cholecystectomy      FAMILY HISTORY:  Family history of smoking  Family history of hypertension  Family history of cancer (Sibling)      Social History:    Outpatient Medications:    MEDICATIONS  (STANDING):    MEDICATIONS  (PRN):      Allergies    No Known Allergies    Intolerances      Review of Systems:  Constitutional: No fever  Eyes: No blurry vision  Neuro: No tremors  HEENT: No pain  Cardiovascular: No chest pain, palpitations  Respiratory: No SOB, no cough  GI: No nausea, vomiting, abdominal pain  : No dysuria  Skin: no rash  Psych: no depression  Endocrine: no polyuria, polydipsia  Hem/lymph: no swelling  Osteoporosis: no fractures    ALL OTHER SYSTEMS REVIEWED AND NEGATIVE    UNABLE TO OBTAIN    PHYSICAL EXAM:  VITALS: T(C): 36.7 (06-01-19 @ 11:00)  T(F): 98.1 (06-01-19 @ 11:00), Max: 98.4 (06-01-19 @ 03:30)  HR: 79 (06-01-19 @ 07:10) (76 - 81)  BP: 171/81 (06-01-19 @ 11:00) (111/61 - 171/81)  RR:  (16 - 20)  SpO2:  (96% - 99%)  Wt(kg): --  GENERAL: NAD, well-groomed, well-developed  EYES: No proptosis, no lid lag, anicteric  HEENT:  Atraumatic, Normocephalic, moist mucous membranes  THYROID: Normal size, no palpable nodules  RESPIRATORY: Clear to auscultation bilaterally; No rales, rhonchi, wheezing, or rubs  CARDIOVASCULAR: Regular rate and rhythm; No murmurs; no peripheral edema  GI: Soft, nontender, non distended, normal bowel sounds  SKIN: Dry, intact, No rashes or lesions  MUSCULOSKELETAL: Full range of motion, normal strength  NEURO: sensation intact, extraocular movements intact, no tremor, normal reflexes  PSYCH: Alert and oriented x 3, normal affect, normal mood  CUSHING'S SIGNS: no striae    POCT Blood Glucose.: 287 mg/dL (06-01-19 @ 11:05)  POCT Blood Glucose.: 158 mg/dL (06-01-19 @ 07:28)  POCT Blood Glucose.: 119 mg/dL (06-01-19 @ 06:06)  POCT Blood Glucose.: 102 mg/dL (06-01-19 @ 04:40)  POCT Blood Glucose.: 133 mg/dL (06-01-19 @ 04:06)  POCT Blood Glucose.: 40 mg/dL (06-01-19 @ 03:45)                            10.6   6.2   )-----------( 243      ( 01 Jun 2019 03:52 )             31.8       06-01    137  |  89<L>  |  41<H>  ----------------------------<  42<LL>  5.2   |  32<H>  |  4.93<H>    EGFR if : 10<L>  EGFR if non : 9<L>    Ca    10.6<H>      06-01  Mg     2.3     06-01  Phos  5.8     06-01    TPro  6.5  /  Alb  4.0  /  TBili  0.2  /  DBili  x   /  AST  19  /  ALT  15  /  AlkPhos  72  06-01      Thyroid Function Tests:      Hemoglobin A1C, Whole Blood: 8.8 % <H> [4.0 - 5.6] (04-30-19 @ 08:58)          Radiology: Endocrien History: 63 yr F with T1DM c/b ESRD on HD, neuropathy, CAD, CVA, PVD adn CHF here with hypoglceymai. Lizbeth was reectnly dischareged approxiamletly 2 weeks ago. She has been hsotpalized multiple tikes in the past for unorlled DM. Lizbeth hererslef is lethargic and unabel to provde history. As epr chart reveiw se took twice her ususal amoutn fo long acting insulin last night. This Am her  foudn her lethargic and EMS was called. Her FS was found ot be 40 adn she received D50. On arriavl her glcusoe was in 40s and she received another amp of D50. She was then starte don D5W with omprovement of her glucose levels. She appears mroe alert than on arrival but is still lethargic and cannot recall what happend. She folows with Dr Ley (nedocrinology). After last hsotalization lizbeth was discahrgeed on Levemir 4 U HS adn Huamlgo 2U TIDAC. lizbeth used to be on isnulin pump int eh apst but is was disocntineud as he was uanel to manage it. Durign rodger last hsotliation she had several epsidoes of hypogcelyamia as she "did ntoliek the hsotlai food."      PAST MEDICAL & SURGICAL HISTORY:  COPD (chronic obstructive pulmonary disease)  Localized enlarged lymph nodes  CHF (congestive heart failure): EF 40-45%  Subclavian vein stenosis, left: s/p stent  DKA, type 1: 2015  ACS (acute coronary syndrome): 2015 - cath revealed 100% ostial stenosis not amenable to PCI - medical management  TIA (transient ischemic attack): x 2 - 8-9 years ago prior to ASD/VSD repair  CAD (coronary artery disease): s/p stents  Gout: past  CVA (cerebral infarction): with no residual, 8 yrs ago, prior to heart surgery - ST memory loss  Peripheral vascular disease: occluded left fem-pop bypass 2015  Diabetes mellitus type 1: Insulin Dependent -  ESRD (end stage renal disease): dialysis  , tue, thursday, saturday  Hyperlipidemia  Status post device closure of ASD: &quot;clamshell&quot;  History of cardiac catheterization: 2015 - no intervention  S/P femoral-popliteal bypass surgery: L and R in  with graft; 2015 CFA angioplasty left and ileofemoral endarterectomywith vein patch angioplasty of left fem-pop bypass graft  Multiple vascular surgery both leg, left fempop bypass revision 2015  AV (arteriovenous fistula): Left AV graft; revision with stent placement 2-3 years ago  S/P cholecystectomy      FAMILY HISTORY:  Family history of smoking  Family history of hypertension  Family history of cancer (Sibling)      Social History: lives with     Outpatient Medications:  · 	furosemide 20 mg oral tablet: 1 tab(s) orally 3 times a week   · 	sevelamer carbonate 800 mg oral tablet: 2 tab(s) orally 3 times a day (with meals)  · 	DULoxetine 60 mg oral delayed release capsule: 1 cap(s) orally once a day  · 	acetaminophen 325 mg oral tablet: 2 tab(s) orally every 6 hours, As needed, Temp greater or equal to 38.5C (101.3F), Mild Pain (1 - 3)  · 	metoprolol succinate 50 mg oral tablet, extended release: 1 tab(s) orally once a day  · 	clopidogrel 75 mg oral tablet: 1 tab(s) orally once a day *see internal meek*  · 	aspirin 81 mg oral delayed release tablet: 1 tab(s) orally once a day  · 	hydrALAZINE 25 mg oral tablet: 1 tab(s) orally 3 times a day  · 	Percocet 5/325 oral tablet: 2 tab(s) orally once a day (at bedtime), As Needed  · 	lisinopril 40 mg oral tablet: 1 tab(s) orally once a day  · 	Multiple Vitamins oral tablet: 1 tab(s) orally once a day  	 MVI  · 	insulin detemir 100 units/mL subcutaneous solution: 4 unit(s) subcutaneous once a day (at bedtime)  · 	HumaLO unit(s) subcutaneous 3 times a day (before meals)      MEDICATIONS  (STANDING):    MEDICATIONS  (PRN):      Allergies    No Known Allergies    Intolerances      Review of Systems:  UNABLE TO OBTAIN    PHYSICAL EXAM:  VITALS: T(C): 36.7 (19 @ 11:00)  T(F): 98.1 (19 @ 11:00), Max: 98.4 (19 @ 03:30)  HR: 79 (19 @ 07:10) (76 - 81)  BP: 171/81 (19 @ 11:00) (111/61 - 171/81)  RR:  (16 - 20)  SpO2:  (96% - 99%)  Wt(kg): --  GENERAL: NAD  EYES: No proptosis, no lid lag, anicteric  HEENT:  Atraumatic, Normocephalic, moist mucous membranes  THYROID: Normal size, no palpable nodules  RESPIRATORY: Clear to auscultation bilaterally; No rales, rhonchi, wheezing, or rubs  CARDIOVASCULAR: Regular rate and rhythm; No murmurs; 2+ peripheral edema  GI: Soft, nontender, non distended, normal bowel sounds  SKIN: Dry, intact, No rashes or lesions  PSYCH: drowsy, oriented X 2      POCT Blood Glucose.: 287 mg/dL (19 @ 11:05)  POCT Blood Glucose.: 158 mg/dL (19 @ 07:28)  POCT Blood Glucose.: 119 mg/dL (19 @ 06:06)  POCT Blood Glucose.: 102 mg/dL (19 @ 04:40)  POCT Blood Glucose.: 133 mg/dL (19 @ 04:06)  POCT Blood Glucose.: 40 mg/dL (19 @ 03:45)                            10.6   6.2   )-----------( 243      ( 2019 03:52 )             31.8           137  |  89<L>  |  41<H>  ----------------------------<  42<LL>  5.2   |  32<H>  |  4.93<H>    EGFR if : 10<L>  EGFR if non : 9<L>    Ca    10.6<H>        Mg     2.3       Phos  5.8         TPro  6.5  /  Alb  4.0  /  TBili  0.2  /  DBili  x   /  AST  19  /  ALT  15  /  AlkPhos  72          Hemoglobin A1C, Whole Blood: 8.8 % <H> [4.0 - 5.6] (19 @ 08:58) Endocrine History: 63 yr F with T1DM c/b ESRD on HD, neuropathy, CAD, CVA, PVD and CHF here with hypoglycemia Patient was recently discharged approximately 2 weeks ago. She has been hsotpalized multiple times in the past for unorlled DM. Lizbeth hererslef is lethargic and unabel to provde history. As epr chart bonita brady took twice her ususal amoutn fo long acting insulin last night. This Am her  foudn her lethargic and EMS was called. Her FS was found ot be 40 adn she received D50. On arriavl her glcusoe was in 40s and she received another amp of D50. She was then starte don D5W with omprovement of her glucose levels. She appears mroe alert than on arrival but is still lethargic and cannot recall what happend. She folows with Dr Ley (nedocrinology). After last hsotalization lizbeth was discahrgeed on Levemir 4 U HS adn Huamlgo 2U TIDAC. lizbeth used to be on isnulin pump int eh apst but is was disocntineud as he was uanel to manage it. Durign rodger last hsotliation she had several epsidoes of hypogcelyamia as she "did ntoliek the hsotlai food."      PAST MEDICAL & SURGICAL HISTORY:  COPD (chronic obstructive pulmonary disease)  Localized enlarged lymph nodes  CHF (congestive heart failure): EF 40-45%  Subclavian vein stenosis, left: s/p stent  DKA, type 1: 2015  ACS (acute coronary syndrome): 2015 - cath revealed 100% ostial stenosis not amenable to PCI - medical management  TIA (transient ischemic attack): x 2 - 8-9 years ago prior to ASD/VSD repair  CAD (coronary artery disease): s/p stents  Gout: past  CVA (cerebral infarction): with no residual, 8 yrs ago, prior to heart surgery - ST memory loss  Peripheral vascular disease: occluded left fem-pop bypass 2015  Diabetes mellitus type 1: Insulin Dependent -  ESRD (end stage renal disease): dialysis  , tue, thursday, saturday  Hyperlipidemia  Status post device closure of ASD: &quot;clamshell&quot;  History of cardiac catheterization: 2015 - no intervention  S/P femoral-popliteal bypass surgery: L and R in  with graft; 2015 CFA angioplasty left and ileofemoral endarterectomywith vein patch angioplasty of left fem-pop bypass graft  Multiple vascular surgery both leg, left fempop bypass revision 2015  AV (arteriovenous fistula): Left AV graft; revision with stent placement 2-3 years ago  S/P cholecystectomy      FAMILY HISTORY:  Family history of smoking  Family history of hypertension  Family history of cancer (Sibling)      Social History: lives with     Outpatient Medications:  · 	furosemide 20 mg oral tablet: 1 tab(s) orally 3 times a week   · 	sevelamer carbonate 800 mg oral tablet: 2 tab(s) orally 3 times a day (with meals)  · 	DULoxetine 60 mg oral delayed release capsule: 1 cap(s) orally once a day  · 	acetaminophen 325 mg oral tablet: 2 tab(s) orally every 6 hours, As needed, Temp greater or equal to 38.5C (101.3F), Mild Pain (1 - 3)  · 	metoprolol succinate 50 mg oral tablet, extended release: 1 tab(s) orally once a day  · 	clopidogrel 75 mg oral tablet: 1 tab(s) orally once a day *see internal meek*  · 	aspirin 81 mg oral delayed release tablet: 1 tab(s) orally once a day  · 	hydrALAZINE 25 mg oral tablet: 1 tab(s) orally 3 times a day  · 	Percocet 5/325 oral tablet: 2 tab(s) orally once a day (at bedtime), As Needed  · 	lisinopril 40 mg oral tablet: 1 tab(s) orally once a day  · 	Multiple Vitamins oral tablet: 1 tab(s) orally once a day  	 MVI  · 	insulin detemir 100 units/mL subcutaneous solution: 4 unit(s) subcutaneous once a day (at bedtime)  · 	HumaLO unit(s) subcutaneous 3 times a day (before meals)      MEDICATIONS  (STANDING):    MEDICATIONS  (PRN):      Allergies    No Known Allergies    Intolerances      Review of Systems:  UNABLE TO OBTAIN    PHYSICAL EXAM:  VITALS: T(C): 36.7 (19 @ 11:00)  T(F): 98.1 (19 @ 11:00), Max: 98.4 (19 @ 03:30)  HR: 79 (06-01-19 @ 07:10) (76 - 81)  BP: 171/81 (19 @ 11:00) (111/61 - 171/81)  RR:  (16 - 20)  SpO2:  (96% - 99%)  Wt(kg): --  GENERAL: NAD  EYES: No proptosis, no lid lag, anicteric  HEENT:  Atraumatic, Normocephalic, moist mucous membranes  THYROID: Normal size, no palpable nodules  RESPIRATORY: Clear to auscultation bilaterally; No rales, rhonchi, wheezing, or rubs  CARDIOVASCULAR: Regular rate and rhythm; No murmurs; 2+ peripheral edema  GI: Soft, nontender, non distended, normal bowel sounds  SKIN: Dry, intact, No rashes or lesions  PSYCH: drowsy, oriented X 2      POCT Blood Glucose.: 287 mg/dL (19 @ 11:05)  POCT Blood Glucose.: 158 mg/dL (19 @ 07:28)  POCT Blood Glucose.: 119 mg/dL (19 @ 06:06)  POCT Blood Glucose.: 102 mg/dL (19 @ 04:40)  POCT Blood Glucose.: 133 mg/dL (19 @ 04:06)  POCT Blood Glucose.: 40 mg/dL (19 @ 03:45)                            10.6   6.2   )-----------( 243      ( 2019 03:52 )             31.8           137  |  89<L>  |  41<H>  ----------------------------<  42<LL>  5.2   |  32<H>  |  4.93<H>    EGFR if : 10<L>  EGFR if non : 9<L>    Ca    10.6<H>        Mg     2.3       Phos  5.8         TPro  6.5  /  Alb  4.0  /  TBili  0.2  /  DBili  x   /  AST  19  /  ALT  15  /  AlkPhos  72          Hemoglobin A1C, Whole Blood: 8.8 % <H> [4.0 - 5.6] (19 @ 08:58) Endocrine History: 63 yr F with T1DM c/b ESRD on HD, neuropathy, CAD, CVA, PVD and CHF here with hypoglycemia. Patient was recently discharged approximately 2 weeks ago. She has been hospitalized multiple times in the past for uncontrolled DM/DKA. Patient herself is lethargic and unable to provide history. As per chart review she took twice her usual amount of long acting insulin last night. This AM her  found her lethargic and EMS was called. Her FS was found to be 40 and she received D50. On arrival her glucose was in 40s and she received another amp of D50. She was then started on D5W with improvement of her glucose levels. She appears more alert than on arrival but is still lethargic and cannot recall what happened. She follows with Dr Ley (endocrinology). After last hospitalization, patient was discharged on Levemir 4 U HS and Humalog 2U TIDAC. Patient used to be on insulin pump in the  past but it was discontinued as she was unable to manage it. During the last hospitalization she had several episodes of hypoglycemia as she "did not like the hospital food."        PAST MEDICAL & SURGICAL HISTORY:  COPD (chronic obstructive pulmonary disease)  Localized enlarged lymph nodes  CHF (congestive heart failure): EF 40-45%  Subclavian vein stenosis, left: s/p stent  DKA, type 1: 2015  ACS (acute coronary syndrome): 2015 - cath revealed 100% ostial stenosis not amenable to PCI - medical management  TIA (transient ischemic attack): x 2 - 8-9 years ago prior to ASD/VSD repair  CAD (coronary artery disease): s/p stents  Gout: past  CVA (cerebral infarction): with no residual, 8 yrs ago, prior to heart surgery - ST memory loss  Peripheral vascular disease: occluded left fem-pop bypass 2015  Diabetes mellitus type 1: Insulin Dependent -  ESRD (end stage renal disease): dialysis  , tue, thursday, saturday  Hyperlipidemia  Status post device closure of ASD: &quot;clamshell&quot;  History of cardiac catheterization: 2015 - no intervention  S/P femoral-popliteal bypass surgery: L and R in  with graft; 2015 CFA angioplasty left and ileofemoral endarterectomywith vein patch angioplasty of left fem-pop bypass graft  Multiple vascular surgery both leg, left fempop bypass revision 2015  AV (arteriovenous fistula): Left AV graft; revision with stent placement 2-3 years ago  S/P cholecystectomy      FAMILY HISTORY:  Family history of smoking  Family history of hypertension  Family history of cancer (Sibling)      Social History: lives with     Outpatient Medications:  · 	furosemide 20 mg oral tablet: 1 tab(s) orally 3 times a week   · 	sevelamer carbonate 800 mg oral tablet: 2 tab(s) orally 3 times a day (with meals)  · 	DULoxetine 60 mg oral delayed release capsule: 1 cap(s) orally once a day  · 	acetaminophen 325 mg oral tablet: 2 tab(s) orally every 6 hours, As needed, Temp greater or equal to 38.5C (101.3F), Mild Pain (1 - 3)  · 	metoprolol succinate 50 mg oral tablet, extended release: 1 tab(s) orally once a day  · 	clopidogrel 75 mg oral tablet: 1 tab(s) orally once a day *see internal meek*  · 	aspirin 81 mg oral delayed release tablet: 1 tab(s) orally once a day  · 	hydrALAZINE 25 mg oral tablet: 1 tab(s) orally 3 times a day  · 	Percocet 5/325 oral tablet: 2 tab(s) orally once a day (at bedtime), As Needed  · 	lisinopril 40 mg oral tablet: 1 tab(s) orally once a day  · 	Multiple Vitamins oral tablet: 1 tab(s) orally once a day  	 MVI  · 	insulin detemir 100 units/mL subcutaneous solution: 4 unit(s) subcutaneous once a day (at bedtime)  · 	HumaLO unit(s) subcutaneous 3 times a day (before meals)      MEDICATIONS  (STANDING):    MEDICATIONS  (PRN):      Allergies    No Known Allergies    Intolerances      Review of Systems:  UNABLE TO OBTAIN    PHYSICAL EXAM:  VITALS: T(C): 36.7 (19 @ 11:00)  T(F): 98.1 (19 @ 11:00), Max: 98.4 (19 @ 03:30)  HR: 79 (19 @ 07:10) (76 - 81)  BP: 171/81 (19 @ 11:00) (111/61 - 171/81)  RR:  (16 - 20)  SpO2:  (96% - 99%)  Wt(kg): --  GENERAL: NAD  EYES: No proptosis, no lid lag, anicteric  HEENT:  Atraumatic, Normocephalic, moist mucous membranes  THYROID: Normal size, no palpable nodules  RESPIRATORY: Clear to auscultation bilaterally; No rales, rhonchi, wheezing, or rubs  CARDIOVASCULAR: Regular rate and rhythm; No murmurs; 2+ peripheral edema  GI: Soft, nontender, non distended, normal bowel sounds  SKIN: Dry, intact, No rashes or lesions  PSYCH: drowsy, oriented X 2      POCT Blood Glucose.: 287 mg/dL (19 @ 11:05)  POCT Blood Glucose.: 158 mg/dL (19 @ 07:28)  POCT Blood Glucose.: 119 mg/dL (19 @ 06:06)  POCT Blood Glucose.: 102 mg/dL (19 @ 04:40)  POCT Blood Glucose.: 133 mg/dL (19 @ 04:06)  POCT Blood Glucose.: 40 mg/dL (19 @ 03:45)                            10.6   6.2   )-----------( 243      ( 2019 03:52 )             31.8           137  |  89<L>  |  41<H>  ----------------------------<  42<LL>  5.2   |  32<H>  |  4.93<H>    EGFR if : 10<L>  EGFR if non : 9<L>    Ca    10.6<H>        Mg     2.3       Phos  5.8         TPro  6.5  /  Alb  4.0  /  TBili  0.2  /  DBili  x   /  AST  19  /  ALT  15  /  AlkPhos  72          Hemoglobin A1C, Whole Blood: 8.8 % <H> [4.0 - 5.6] (19 @ 08:58)

## 2019-06-01 NOTE — H&P ADULT - NSHPPHYSICALEXAM_GEN_ALL_CORE
PHYSICAL EXAMINATION:  Vital Signs Last 24 Hrs  T(C): 36.7 (01 Jun 2019 11:00), Max: 36.9 (01 Jun 2019 03:30)  T(F): 98.1 (01 Jun 2019 11:00), Max: 98.4 (01 Jun 2019 03:30)  HR: 79 (01 Jun 2019 07:10) (76 - 81)  BP: 171/81 (01 Jun 2019 11:00) (111/61 - 171/81)  BP(mean): --  RR: 18 (01 Jun 2019 11:00) (16 - 20)  SpO2: 97% (01 Jun 2019 11:00) (96% - 99%)  CAPILLARY BLOOD GLUCOSE      POCT Blood Glucose.: 287 mg/dL (01 Jun 2019 11:05)  POCT Blood Glucose.: 158 mg/dL (01 Jun 2019 07:28)  POCT Blood Glucose.: 119 mg/dL (01 Jun 2019 06:06)  POCT Blood Glucose.: 102 mg/dL (01 Jun 2019 04:40)  POCT Blood Glucose.: 133 mg/dL (01 Jun 2019 04:06)  POCT Blood Glucose.: 40 mg/dL (01 Jun 2019 03:45)      GENERAL: NAD, well-groomed, well-developed  HEAD:  atraumatic, normocephalic  EYES: sclera anicteric  ENMT: mucous membranes moist  NECK: supple, No JVD  CHEST/LUNG: clear to auscultation bilaterally; no rales, rhonchi, or wheezing b/l  HEART: normal S1, S2  ABDOMEN: BS+, soft, ND, NT   EXTREMITIES:  pulses palpable; no clubbing, cyanosis, or edema b/l LEs  NEURO: awake, alert, interactive; moves all extremities  SKIN: no rashes or lesions

## 2019-06-01 NOTE — ED ADULT NURSE NOTE - INTERVENTIONS DEFINITIONS
Monitor gait and stability/Stretcher in lowest position, wheels locked, appropriate side rails in place/Call bell, personal items and telephone within reach/Instruct patient to call for assistance/Physically safe environment: no spills, clutter or unnecessary equipment

## 2019-06-01 NOTE — H&P ADULT - ASSESSMENT
63 f with  IDDM type 1 and hypoglycemia   - BS control  - endocrine evaluation    ESRD  - HD  - nephrology evaluation Dr. Schmidt    CAD (coronary artery disease) s/p stents  - stable  - continue Rx    CHF (congestive heart failure)   - cardiology evaluation dr. Biswas    COPD (chronic obstructive pulmonary disease)   - nebs prn    HTN control    CVA   - supportive care     Depression/ Anxiety  - continue Rx    DVT/GI prphylaxis    Further action as per clinical course   Pierce Viera MD pager [phone]

## 2019-06-01 NOTE — PROVIDER CONTACT NOTE (OTHER) - ASSESSMENT
Alert and oriented x4. able to respond to commands/stimlui. VSS. afebrile. denies pain or discomfort. Denies thirst, vision changes, no s/s of hyperglycemia.

## 2019-06-01 NOTE — CONSULT NOTE ADULT - SUBJECTIVE AND OBJECTIVE BOX
[facility]  [phone]  NEPHROLOGY      INITIAL CONSULT NOTE  --------------------------------------------------------------------------------  HPI:    64 yo female presenting with hypoglycemia from home.  found to have fingerstick in the 40s per ems, improved with glucose paste to 80s.  Per EMS, patient took double her dose of long acting insulin by accident.  patient endorses same thing.  also complaining of abdominal pain predominantly in the llq.  unable to quantify or qualify pain.  last hd 2 d ago. renal consult called for esrd     PAST HISTORY  --------------------------------------------------------------------------------  PAST MEDICAL & SURGICAL HISTORY:  COPD (chronic obstructive pulmonary disease)  Localized enlarged lymph nodes  CHF (congestive heart failure): EF 40-45%  Subclavian vein stenosis, left: s/p stent  DKA, type 1: 1/2015  ACS (acute coronary syndrome): 1/2015 - cath revealed 100% ostial stenosis not amenable to PCI - medical management  TIA (transient ischemic attack): x 2 - 8-9 years ago prior to ASD/VSD repair  CAD (coronary artery disease): s/p stents  Gout: past  CVA (cerebral infarction): with no residual, 8 yrs ago, prior to heart surgery - ST memory loss  Peripheral vascular disease: occluded left fem-pop bypass 5/2015  Diabetes mellitus type 1: Insulin Dependent -  ESRD (end stage renal disease): dialysis  M, tue, thursday, saturday  Hyperlipidemia  Status post device closure of ASD: &quot;clamshell&quot;  History of cardiac catheterization: 1/2015 - no intervention  S/P femoral-popliteal bypass surgery: L and R in 2013 with graft; 5/2015 CFA angioplasty left and ileofemoral endarterectomywith vein patch angioplasty of left fem-pop bypass graft  Multiple vascular surgery both leg, left fempop bypass revision 11/2015  AV (arteriovenous fistula): Left AV graft; revision with stent placement 2-3 years ago  S/P cholecystectomy    FAMILY HISTORY:  Family history of smoking  Family history of hypertension  Family history of cancer (Sibling)    PAST SOCIAL HISTORY: past tobacco use     ALLERGIES & MEDICATIONS  --------------------------------------------------------------------------------  Allergies    No Known Allergies    Intolerances      Standing Inpatient Medications  ALBUTerol/ipratropium for Nebulization 3 milliLiter(s) Nebulizer every 6 hours  aspirin enteric coated 81 milliGRAM(s) Oral daily  clopidogrel Tablet 75 milliGRAM(s) Oral daily  dextrose 5%. 1000 milliLiter(s) IV Continuous <Continuous>  dextrose 50% Injectable 12.5 Gram(s) IV Push once  dextrose 50% Injectable 25 Gram(s) IV Push once  dextrose 50% Injectable 25 Gram(s) IV Push once  DULoxetine 60 milliGRAM(s) Oral daily  furosemide    Tablet 20 milliGRAM(s) Oral <User Schedule>  heparin  Injectable 5000 Unit(s) SubCutaneous every 12 hours  hydrALAZINE 25 milliGRAM(s) Oral three times a day  insulin glargine Injectable (LANTUS) 6 Unit(s) SubCutaneous at bedtime  insulin lispro (HumaLOG) corrective regimen sliding scale   SubCutaneous three times a day before meals  insulin lispro (HumaLOG) corrective regimen sliding scale   SubCutaneous at bedtime  insulin lispro Injectable (HumaLOG) 3 Unit(s) SubCutaneous before breakfast  insulin lispro Injectable (HumaLOG) 3 Unit(s) SubCutaneous before lunch  insulin lispro Injectable (HumaLOG) 3 Unit(s) SubCutaneous before dinner  lisinopril 40 milliGRAM(s) Oral daily  metoprolol succinate ER 50 milliGRAM(s) Oral daily  multivitamin 1 Tablet(s) Oral daily  pantoprazole    Tablet 40 milliGRAM(s) Oral before breakfast  sevelamer carbonate 1600 milliGRAM(s) Oral three times a day with meals    PRN Inpatient Medications  acetaminophen   Tablet .. 650 milliGRAM(s) Oral every 6 hours PRN  dextrose 40% Gel 15 Gram(s) Oral once PRN  glucagon  Injectable 1 milliGRAM(s) IntraMuscular once PRN  oxyCODONE    5 mG/acetaminophen 325 mG 1 Tablet(s) Oral every 6 hours PRN      REVIEW OF SYSTEMS  --------------------------------------------------------------------------------  Gen: No  fevers/chills   Skin: No rashes  Head/Eyes/Ears/Mouth: No headache; Normal hearing;  No sinus pain/discomfort, sore throat  Respiratory: No dyspnea, cough, wheezing  CV: No chest pain, orthopnea  GI: No abdominal pain, diarrhea, nausea, vomiting, melena  : No dysuria,  hematuria,  MSK: No joint pain/swelling; no back pain  Neuro: No dizziness/lightheadedness,   also with no edema     All other systems were reviewed and are negative, except as noted.    VITALS/PHYSICAL EXAM  --------------------------------------------------------------------------------  T(C): 37.1 (06-01-19 @ 14:18), Max: 37.1 (06-01-19 @ 14:18)  HR: 71 (06-01-19 @ 14:18) (71 - 81)  BP: 128/84 (06-01-19 @ 14:18) (111/61 - 171/81)  RR: 20 (06-01-19 @ 14:18) (16 - 20)  SpO2: 98% (06-01-19 @ 14:18) (96% - 99%)  Wt(kg): --    Weight (kg): 49.9 (06-01-19 @ 03:30)      Physical Exam:  	Gen frail appearing   	no jvd ,  	Pulm: decrease bs  no rales or ronchi or wheezing  	CV: RRR, S1S2; no rub  	Back: No CVA tenderness; no sacral edema  	Abd: +BS, soft, nontender/nondistended  	: No suprapubic tenderness  	UE: Warm, no cyanosis  no clubbing,  no edema; no asterixis  	LE: Warm, no cyanosis  no clubbing, 2+ pitting  edema  	Neuro: alert and oriented. speech coherent   		Vascular access: + avf + bruit and thrill     LABS/STUDIES  --------------------------------------------------------------------------------              10.6   6.2   >-----------<  243      [06-01-19 @ 03:52]              31.8     137  |  89  |  41  ----------------------------<  42      [06-01-19 @ 03:52]  5.2   |  32  |  4.93        Ca     10.6     [06-01-19 @ 03:52]      Mg     2.3     [06-01-19 @ 03:52]      Phos  5.8     [06-01-19 @ 03:52]    TPro  6.5  /  Alb  4.0  /  TBili  0.2  /  DBili  x   /  AST  19  /  ALT  15  /  AlkPhos  72  [06-01-19 @ 03:52]          Creatinine Trend:  SCr 4.93 [06-01 @ 03:52]  SCr 5.93 [05-21 @ 06:20]  SCr 4.27 [05-20 @ 07:14]  SCr 2.63 [05-19 @ 06:18]  SCr 2.84 [05-18 @ 05:21]    Urinalysis - [06-04-17 @ 08:24]      Color Yellow / Appearance Clear / SG 1.013 / pH 8.5      Gluc 1000 / Ketone Negative  / Bili Negative / Urobili Negative       Blood Negative / Protein >600 / Leuk Est Small / Nitrite Negative      RBC 3-5 / WBC 6-10 / Hyaline  / Gran  / Sq Epi  / Non Sq Epi OCC / Bacteria       PTH -- (Ca 7.8)      [03-29-19 @ 20:38]   73  PTH -- (Ca 7.3)      [02-22-19 @ 02:49]   59  HbA1c 8.8      [04-30-19 @ 08:58]  TSH 0.71      [11-17-18 @ 08:25]    HBsAb <3.0      [04-19-19 @ 07:50]  HBsAb Nonreact      [03-26-19 @ 16:04]  HBsAg Nonreact      [04-19-19 @ 07:50]  HBcAb Nonreact      [03-26-19 @ 16:04]  HCV 0.11, Nonreact      [04-19-19 @ 07:50]

## 2019-06-01 NOTE — ED PROVIDER NOTE - PHYSICAL EXAMINATION
gen: lethargic  Mentation: AAO x 2  psych: mood appropriate  ENT: airway patent  Eyes: conjunctivae clear bilaterally  Cardio: RRR, no m/r/g  Resp: normal BS b/l  GI: s/llq tenderness/nd   Neuro: AAO x 2, sensation and motor function intact  MSK: normal movement of all extremities

## 2019-06-01 NOTE — H&P ADULT - NSHPSOCIALHISTORY_GEN_ALL_CORE
Social History:    Marital Status:  (  x )    (   ) Single    (   )    (  )   Occupation:   Lives with: (  ) alone  (  ) children   (x ) spouse   (  ) parents  (  ) other    Substance Use (street drugs): ( x ) never used  (  ) other:  Tobacco Usage:  (  x ) never smoked   (   ) former smoker   (   ) current smoker  (     ) pack years  (        ) last cigarette date  Alcohol Usage: denies    (     ) Advanced Directives: (     ) None    (      ) DNR    (     ) DNI    (     ) Health Care Proxy:

## 2019-06-01 NOTE — H&P ADULT - NSHPLABSRESULTS_GEN_ALL_CORE
10.6   6.2   )-----------( 243      ( 01 Jun 2019 03:52 )             31.8       06-01    137  |  89<L>  |  41<H>  ----------------------------<  42<LL>  5.2   |  32<H>  |  4.93<H>    Ca    10.6<H>      01 Jun 2019 03:52  Phos  5.8     06-01  Mg     2.3     06-01    TPro  6.5  /  Alb  4.0  /  TBili  0.2  /  DBili  x   /  AST  19  /  ALT  15  /  AlkPhos  72  06-01                      Lactate Trend            CAPILLARY BLOOD GLUCOSE      POCT Blood Glucose.: 287 mg/dL (01 Jun 2019 11:05)

## 2019-06-01 NOTE — ED ADULT NURSE NOTE - OBJECTIVE STATEMENT
64 y/o Female presenting to the ED by EMS, A&Ox2, after taking to much insulin at home.  found her blood glucose to be in the 20's, after EMS gave dextrose the blood glucose increased to the 80's. Upon arrival to the ED, blood glucose found to be in the 40's. Pt extremely lethargic. Responsive to voice. Pupils 3mm PERRL. Immediately upon arrival pt given an amp of dextrose, cranberry juice and started on D10 drip as per MD Castrejon. Awaiting  for more information as to what insulin she took and at what time. Safety and comfort measures provided. Bed in lowest position. Side rails up for safety.

## 2019-06-01 NOTE — ED ADULT NURSE REASSESSMENT NOTE - NS ED NURSE REASSESS COMMENT FT1
Pt reports she feels like it is hard to breathe, fluids stopped as per MD Castrejon, Lung sounds clear, respiratory rate 20 with deep breaths noted, sating 98% on room air. Pt sat up. Pt on cardiac monitor showing NSR to the 70's. Safety and comfort measures maintained.

## 2019-06-01 NOTE — CONSULT NOTE ADULT - PROBLEM SELECTOR RECOMMENDATION 9
Diabetes Education and Nutrition Eval  Start Lantus 6 units qhs  Start Humalog 3 units qac + 2 units for bedtime snack. Can give with the meal or up to 5 min after to ensure patient is going to be eating.   Low correction scale qac + bedtime  Goal glucose 100-180  Outpt. endo follow-up w/ Dr. Ley  Outpt. optho, podiatry, nephrology  Plan to d/c on basal bolus. Diabetes Education and Nutrition Eval  Start Lantus 4 units qhs  Start Humalog 2 units qac. Can give with the meal or up to 5 min after to ensure patient is going to be eating.   Low correction scale qac + bedtime  Goal glucose 100-180  Outpt. endo follow-up w/ Dr. Ley  Outpt. optho, podiatry, nephrology  Plan to d/c on basal bolus.

## 2019-06-01 NOTE — ED ADULT NURSE REASSESSMENT NOTE - NS ED NURSE REASSESS COMMENT FT1
Pt moved into a room, undressed and put into a gown. Pt slightly more awake than when she arrived. Pt resting quietly.

## 2019-06-01 NOTE — ED PROVIDER NOTE - OBJECTIVE STATEMENT
62 yo female presenting with hypoglycemia from home.  found to have fingerstick in the 40s per ems, improved with glucose paste to 80s.  Per EMS, patient took double her dose of long acting insulin by accident.  patient endorses same thing.  also complaining of abdominal pain predominantly in the llq.  unable to quantify or qualify pain.

## 2019-06-01 NOTE — PROVIDER CONTACT NOTE (OTHER) - ACTION/TREATMENT ORDERED:
Repeat blood glucose within 2 hours, KALI manuel no new orders~ patient to received HD tx. Will continue to monitor status and maintain safety.

## 2019-06-01 NOTE — ED ADULT NURSE REASSESSMENT NOTE - NS ED NURSE REASSESS COMMENT FT1
Pt straight cath for urine as per MD Garcia. Sterile technique used. Pt tolerated well. Successful after one attempt. Small amount of urine obtained for a UA. Pt awake and alert, A&Ox2 at this time.

## 2019-06-01 NOTE — H&P ADULT - NSHPREVIEWOFSYSTEMS_GEN_ALL_CORE
REVIEW OF SYSTEMS:    CONSTITUTIONAL: + weakness, no fevers or chills  EYES/ENT: No visual changes;  No vertigo or throat pain   NECK: No pain or stiffness  RESPIRATORY: No cough, wheezing, hemoptysis; No shortness of breath  CARDIOVASCULAR: No chest pain or palpitations  GASTROINTESTINAL: + abdominal pain. No nausea, vomiting, or hematemesis; No diarrhea or constipation. No melena or hematochezia.  GENITOURINARY: No dysuria, frequency or hematuria  NEUROLOGICAL: No numbness or weakness  SKIN: No itching, burning, rashes, or lesions   All other review of systems is negative unless indicated above.

## 2019-06-01 NOTE — CONSULT NOTE ADULT - ASSESSMENT
62yo F h/o DM1 w frequent hospitalizations for DKA, ESRD on HD (LUE AVF, M/T/Th/Sa, last HD currently), CAD s/p  stents, HTN HLD, CVA, PVD presents from home w/ LE pain and swelling with hyperglycemia with glucose > 400 (high risk patient with high level decision-making). 64yo F h/o DM1 w frequent hospitalizations for DKA, ESRD on HD (ANA AVF, M/T/Th/Sa, last HD currently), CAD s/p  stents, HTN HLD, CVA, PVD presents with hypoglycemia as a resutl of accidental overdsoe of long actign insulin at night. 64yo F h/o DM1 w frequent hospitalizations for DKA, ESRD on HD (ANA AVF, M/T/Th/Sa, last HD currently), CAD s/p  stents, HTN HLD, CVA, PVD presents with hypoglycemia as a result of accidental overdsoe of long actign insulin at night. 64yo F h/o DM1 w frequent hospitalizations for DKA, ESRD on HD (PEPEE AVF, M/T/Th/Sa, last HD currently), CAD s/p  stents, HTN HLD, CVA, PVD presents with hypoglycemia as a result of accidental overdose of long acting insulin at night.

## 2019-06-01 NOTE — CONSULT NOTE ADULT - ASSESSMENT
64 y/o Female PMH CAD Sp PTCA w stents CHF,COPD, CVA, DMT1, ESRD on HD (M,Tu,Th,Sa), Gout, HLD, PVD, Sublcavian Stensosis (L) sp stent. PSH ASD Repair,idania,fem-pop bypass.  now presents with hypoglycemia    1- esrd  2- chf  3- hypoglycemia  4-  htn   5- shpt       hd consent obtained witnessed and placed in chart  fluid removal with hd  renvela 3 tab with meals   frequent FS to monitor hypoglycemia  hydralazine 25 mg tid and lisinopril 40 mg daily

## 2019-06-01 NOTE — ED ADULT NURSE REASSESSMENT NOTE - NS ED NURSE REASSESS COMMENT FT1
0700 Report received from night nurse. 63 yr old WF in ER rm 30. Awaiting dispo. A&Ox4. color pink. skin W&D. Lungs clear. abd soft. IVL intact R upper arm without sx of infilt. AV fist left arm +thrill. due for dialysis today. No c/o pain.

## 2019-06-02 DIAGNOSIS — E10.649 TYPE 1 DIABETES MELLITUS WITH HYPOGLYCEMIA WITHOUT COMA: ICD-10-CM

## 2019-06-02 LAB
GLUCOSE BLDC GLUCOMTR-MCNC: 130 MG/DL — HIGH (ref 70–99)
GLUCOSE BLDC GLUCOMTR-MCNC: 194 MG/DL — HIGH (ref 70–99)
GLUCOSE BLDC GLUCOMTR-MCNC: 275 MG/DL — HIGH (ref 70–99)
GLUCOSE BLDC GLUCOMTR-MCNC: 275 MG/DL — HIGH (ref 70–99)
GLUCOSE BLDC GLUCOMTR-MCNC: 333 MG/DL — HIGH (ref 70–99)
HBV SURFACE AB SER-ACNC: <3 MIU/ML — LOW
HBV SURFACE AG SER-ACNC: SIGNIFICANT CHANGE UP
HCV AB S/CO SERPL IA: 0.12 S/CO — SIGNIFICANT CHANGE UP (ref 0–0.99)
HCV AB SERPL-IMP: SIGNIFICANT CHANGE UP

## 2019-06-02 PROCEDURE — 99232 SBSQ HOSP IP/OBS MODERATE 35: CPT

## 2019-06-02 RX ADMIN — Medication 3 MILLILITER(S): at 13:11

## 2019-06-02 RX ADMIN — Medication 25 MILLIGRAM(S): at 15:16

## 2019-06-02 RX ADMIN — Medication 3 MILLILITER(S): at 05:46

## 2019-06-02 RX ADMIN — DULOXETINE HYDROCHLORIDE 60 MILLIGRAM(S): 30 CAPSULE, DELAYED RELEASE ORAL at 13:10

## 2019-06-02 RX ADMIN — Medication 3 MILLILITER(S): at 18:26

## 2019-06-02 RX ADMIN — Medication 2 UNIT(S): at 18:27

## 2019-06-02 RX ADMIN — Medication 25 MILLIGRAM(S): at 22:06

## 2019-06-02 RX ADMIN — Medication 1 TABLET(S): at 13:11

## 2019-06-02 RX ADMIN — Medication 2 UNIT(S): at 13:11

## 2019-06-02 RX ADMIN — Medication 3 MILLILITER(S): at 23:24

## 2019-06-02 RX ADMIN — CLOPIDOGREL BISULFATE 75 MILLIGRAM(S): 75 TABLET, FILM COATED ORAL at 13:11

## 2019-06-02 RX ADMIN — OXYCODONE AND ACETAMINOPHEN 1 TABLET(S): 5; 325 TABLET ORAL at 22:06

## 2019-06-02 RX ADMIN — SEVELAMER CARBONATE 1600 MILLIGRAM(S): 2400 POWDER, FOR SUSPENSION ORAL at 13:11

## 2019-06-02 RX ADMIN — OXYCODONE AND ACETAMINOPHEN 1 TABLET(S): 5; 325 TABLET ORAL at 22:30

## 2019-06-02 RX ADMIN — LISINOPRIL 40 MILLIGRAM(S): 2.5 TABLET ORAL at 05:46

## 2019-06-02 RX ADMIN — SEVELAMER CARBONATE 1600 MILLIGRAM(S): 2400 POWDER, FOR SUSPENSION ORAL at 18:26

## 2019-06-02 RX ADMIN — Medication 81 MILLIGRAM(S): at 13:11

## 2019-06-02 RX ADMIN — Medication 2 UNIT(S): at 08:59

## 2019-06-02 RX ADMIN — INSULIN GLARGINE 4 UNIT(S): 100 INJECTION, SOLUTION SUBCUTANEOUS at 22:21

## 2019-06-02 RX ADMIN — Medication 25 MILLIGRAM(S): at 05:46

## 2019-06-02 RX ADMIN — HEPARIN SODIUM 5000 UNIT(S): 5000 INJECTION INTRAVENOUS; SUBCUTANEOUS at 05:46

## 2019-06-02 RX ADMIN — Medication 4: at 18:27

## 2019-06-02 RX ADMIN — PANTOPRAZOLE SODIUM 40 MILLIGRAM(S): 20 TABLET, DELAYED RELEASE ORAL at 06:37

## 2019-06-02 RX ADMIN — SEVELAMER CARBONATE 1600 MILLIGRAM(S): 2400 POWDER, FOR SUSPENSION ORAL at 08:59

## 2019-06-02 RX ADMIN — Medication 1: at 13:11

## 2019-06-02 RX ADMIN — Medication 1: at 22:22

## 2019-06-02 RX ADMIN — Medication 50 MILLIGRAM(S): at 05:46

## 2019-06-02 NOTE — PROGRESS NOTE ADULT - SUBJECTIVE AND OBJECTIVE BOX
Diabetes Follow up note:  Interval Hx:  62 yo woman with uncontrolled type 1 diabetes (well known from multiple prior admissions) DM c/b ESRD on HD, neuropathy, CAD, CVA, CHF and multiple cardiac stents presents w/hypoglycemia. Per chart review and w/conversation w/pt believes she took her basal insulin twice the night before hypoglycemia. Bg values stabilized over past 24 hours in 100s today. Pt seen at bedside. Reports feeling better. Tolerating POs. Unable to provide details of events leading to admission.     Review of Systems:  General: as above.   GI: Tolerating POs without any N/V/D/ABD PAIN.  CV: No CP/SOB  ENDO: No S&Sx of hypoglycemia  MEDS:  insulin glargine Injectable (LANTUS) 4 Unit(s) SubCutaneous at bedtime  insulin lispro (HumaLOG) corrective regimen sliding scale   SubCutaneous three times a day before meals  insulin lispro (HumaLOG) corrective regimen sliding scale   SubCutaneous at bedtime  insulin lispro Injectable (HumaLOG) 2 Unit(s) SubCutaneous before breakfast  insulin lispro Injectable (HumaLOG) 2 Unit(s) SubCutaneous before lunch  insulin lispro Injectable (HumaLOG) 2 Unit(s) SubCutaneous before dinner      Allergies    No Known Allergies      PE:  General: Female sitting in chair. NAD.   Vital Signs Last 24 Hrs  T(C): 36.8 (02 Jun 2019 05:06), Max: 37.1 (01 Jun 2019 14:18)  T(F): 98.3 (02 Jun 2019 05:06), Max: 98.8 (01 Jun 2019 21:50)  HR: 77 (02 Jun 2019 05:06) (71 - 78)  BP: 129/69 (02 Jun 2019 05:06) (127/73 - 161/83)  BP(mean): --  RR: 18 (02 Jun 2019 05:06) (18 - 20)  SpO2: 95% (02 Jun 2019 05:06) (95% - 98%)  Abd: Soft, NT,ND,   Extremities: Warm. L AV fistula intact   Neuro: A&O X3    LABS:    POCT Blood Glucose.: 194 mg/dL (06-02-19 @ 12:28)  POCT Blood Glucose.: 130 mg/dL (06-02-19 @ 08:39)  POCT Blood Glucose.: 154 mg/dL (06-01-19 @ 22:54)  POCT Blood Glucose.: 212 mg/dL (06-01-19 @ 19:23)  POCT Blood Glucose.: 283 mg/dL (06-01-19 @ 17:54)  POCT Blood Glucose.: 341 mg/dL (06-01-19 @ 16:26)  POCT Blood Glucose.: 501 mg/dL (06-01-19 @ 14:24)  POCT Blood Glucose.: 510 mg/dL (06-01-19 @ 14:23)  POCT Blood Glucose.: 287 mg/dL (06-01-19 @ 11:05)  POCT Blood Glucose.: 158 mg/dL (06-01-19 @ 07:28)  POCT Blood Glucose.: 119 mg/dL (06-01-19 @ 06:06)  POCT Blood Glucose.: 102 mg/dL (06-01-19 @ 04:40)  POCT Blood Glucose.: 133 mg/dL (06-01-19 @ 04:06)  POCT Blood Glucose.: 40 mg/dL (06-01-19 @ 03:45)                            10.6   6.2   )-----------( 243      ( 01 Jun 2019 03:52 )             31.8       06-01    137  |  89<L>  |  41<H>  ----------------------------<  42<LL>  5.2   |  32<H>  |  4.93<H>    Ca    10.6<H>      01 Jun 2019 03:52  Phos  5.8     06-01  Mg     2.3     06-01    TPro  6.5  /  Alb  4.0  /  TBili  0.2  /  DBili  x   /  AST  19  /  ALT  15  /  AlkPhos  72  06-01        Hemoglobin A1C, Whole Blood: 8.8 % <H> [4.0 - 5.6] (04-30-19 @ 08:58)            Contact number: isabel [phone] or [phone]

## 2019-06-02 NOTE — PROGRESS NOTE ADULT - SUBJECTIVE AND OBJECTIVE BOX
Patient is a 63y old  Female who presents with a chief complaint of weakness (02 Jun 2019 14:01)      SUBJECTIVE / OVERNIGHT EVENTS: feels better  Review of Systems  chest pain no  palpitations no  sob no  nausea no  headache no    MEDICATIONS  (STANDING):  ALBUTerol/ipratropium for Nebulization 3 milliLiter(s) Nebulizer every 6 hours  aspirin enteric coated 81 milliGRAM(s) Oral daily  clopidogrel Tablet 75 milliGRAM(s) Oral daily  dextrose 5%. 1000 milliLiter(s) (50 mL/Hr) IV Continuous <Continuous>  dextrose 50% Injectable 12.5 Gram(s) IV Push once  dextrose 50% Injectable 25 Gram(s) IV Push once  dextrose 50% Injectable 25 Gram(s) IV Push once  DULoxetine 60 milliGRAM(s) Oral daily  furosemide    Tablet 20 milliGRAM(s) Oral <User Schedule>  heparin  Injectable 5000 Unit(s) SubCutaneous every 12 hours  hydrALAZINE 25 milliGRAM(s) Oral three times a day  insulin glargine Injectable (LANTUS) 4 Unit(s) SubCutaneous at bedtime  insulin lispro (HumaLOG) corrective regimen sliding scale   SubCutaneous three times a day before meals  insulin lispro (HumaLOG) corrective regimen sliding scale   SubCutaneous at bedtime  insulin lispro Injectable (HumaLOG) 2 Unit(s) SubCutaneous before breakfast  insulin lispro Injectable (HumaLOG) 2 Unit(s) SubCutaneous before lunch  insulin lispro Injectable (HumaLOG) 2 Unit(s) SubCutaneous before dinner  lisinopril 40 milliGRAM(s) Oral daily  metoprolol succinate ER 50 milliGRAM(s) Oral daily  multivitamin 1 Tablet(s) Oral daily  pantoprazole    Tablet 40 milliGRAM(s) Oral before breakfast  sevelamer carbonate 1600 milliGRAM(s) Oral three times a day with meals    MEDICATIONS  (PRN):  acetaminophen   Tablet .. 650 milliGRAM(s) Oral every 6 hours PRN Temp greater or equal to 38.5C (101.3F), Mild Pain (1 - 3)  dextrose 40% Gel 15 Gram(s) Oral once PRN Blood Glucose LESS THAN 70 milliGRAM(s)/deciliter  glucagon  Injectable 1 milliGRAM(s) IntraMuscular once PRN Glucose LESS THAN 70 milligrams/deciliter  oxyCODONE    5 mG/acetaminophen 325 mG 1 Tablet(s) Oral every 6 hours PRN Moderate Pain (4 - 6)      Vital Signs Last 24 Hrs  T(C): 36.6 (02 Jun 2019 14:34), Max: 37.1 (01 Jun 2019 21:50)  T(F): 97.9 (02 Jun 2019 14:34), Max: 98.8 (01 Jun 2019 21:50)  HR: 82 (02 Jun 2019 14:34) (73 - 82)  BP: 164/84 (02 Jun 2019 14:34) (127/73 - 164/84)  BP(mean): --  RR: 18 (02 Jun 2019 14:34) (18 - 19)  SpO2: 96% (02 Jun 2019 14:34) (95% - 98%)    PHYSICAL EXAM:  GENERAL: NAD  HEAD:  Atraumatic, Normocephalic  EYES: EOMI, PERRLA, conjunctiva and sclera clear  NECK: Supple, No JVD  CHEST/LUNG: Clear to auscultation bilaterally; No wheeze  HEART: Regular rate and rhythm; No murmurs, rubs, or gallops  ABDOMEN: Soft, Nontender, Nondistended; Bowel sounds present  EXTREMITIES:  2+ bipedal edema L>R  PSYCH: AAOx3  NEUROLOGY: non-focal  SKIN: No rashes or lesions    LABS:                        10.6   6.2   )-----------( 243      ( 01 Jun 2019 03:52 )             31.8     06-01    137  |  89<L>  |  41<H>  ----------------------------<  42<LL>  5.2   |  32<H>  |  4.93<H>    Ca    10.6<H>      01 Jun 2019 03:52  Phos  5.8     06-01  Mg     2.3     06-01    TPro  6.5  /  Alb  4.0  /  TBili  0.2  /  DBili  x   /  AST  19  /  ALT  15  /  AlkPhos  72  06-01                RADIOLOGY & ADDITIONAL TESTS:    Imaging Personally Reviewed:    Consultant(s) Notes Reviewed:      Care Discussed with Consultants/Other Providers:

## 2019-06-02 NOTE — CONSULT NOTE ADULT - SUBJECTIVE AND OBJECTIVE BOX
CHIEF COMPLAINT: left leg pain/swelling    HISTORY OF PRESENT ILLNESS:  64 y/o Female PMH CAD Sp PTCA w stents CHF,COPD, CVA, DMT1, ESRD on HD (M,Tu,Th,Sa), Gout, HLD, PVD, Sublcavian Stensosis (L) sp stent. PSH ASD Repair,idania,fem-pop bypass p/w hypoglycemic episode in setting of iatrogenic insulin overdose. also reports worsening lle pain and swelling. denies any cp/sob/palps/f/c/n/v. had abd pain yesterday which since resolved        Allergies  No Known Allergies      MEDICATIONS:  aspirin enteric coated 81 milliGRAM(s) Oral daily  clopidogrel Tablet 75 milliGRAM(s) Oral daily  furosemide    Tablet 20 milliGRAM(s) Oral <User Schedule>  heparin  Injectable 5000 Unit(s) SubCutaneous every 12 hours  hydrALAZINE 25 milliGRAM(s) Oral three times a day  lisinopril 40 milliGRAM(s) Oral daily  metoprolol succinate ER 50 milliGRAM(s) Oral daily  ALBUTerol/ipratropium for Nebulization 3 milliLiter(s) Nebulizer every 6 hours  acetaminophen   Tablet .. 650 milliGRAM(s) Oral every 6 hours PRN  DULoxetine 60 milliGRAM(s) Oral daily  oxyCODONE    5 mG/acetaminophen 325 mG 1 Tablet(s) Oral every 6 hours PRN  pantoprazole    Tablet 40 milliGRAM(s) Oral before breakfast  dextrose 40% Gel 15 Gram(s) Oral once PRN  dextrose 50% Injectable 12.5 Gram(s) IV Push once  dextrose 50% Injectable 25 Gram(s) IV Push once  dextrose 50% Injectable 25 Gram(s) IV Push once  glucagon  Injectable 1 milliGRAM(s) IntraMuscular once PRN  insulin glargine Injectable (LANTUS) 4 Unit(s) SubCutaneous at bedtime  insulin lispro (HumaLOG) corrective regimen sliding scale   SubCutaneous three times a day before meals  insulin lispro (HumaLOG) corrective regimen sliding scale   SubCutaneous at bedtime  insulin lispro Injectable (HumaLOG) 2 Unit(s) SubCutaneous before breakfast  insulin lispro Injectable (HumaLOG) 2 Unit(s) SubCutaneous before lunch  insulin lispro Injectable (HumaLOG) 2 Unit(s) SubCutaneous before dinner    dextrose 5%. 1000 milliLiter(s) IV Continuous <Continuous>  multivitamin 1 Tablet(s) Oral daily      PAST MEDICAL & SURGICAL HISTORY:  COPD (chronic obstructive pulmonary disease)  Localized enlarged lymph nodes  CHF (congestive heart failure): EF 40-45%  Subclavian vein stenosis, left: s/p stent  DKA, type 1: 1/2015  ACS (acute coronary syndrome): 1/2015 - cath revealed 100% ostial stenosis not amenable to PCI - medical management  TIA (transient ischemic attack): x 2 - 8-9 years ago prior to ASD/VSD repair  CAD (coronary artery disease): s/p stents  Gout: past  CVA (cerebral infarction): with no residual, 8 yrs ago, prior to heart surgery - ST memory loss  Peripheral vascular disease: occluded left fem-pop bypass 5/2015  Diabetes mellitus type 1: Insulin Dependent -  ESRD (end stage renal disease): dialysis  M, tue, thursday, saturday  Hyperlipidemia  Status post device closure of ASD: &quot;clamshell&quot;  History of cardiac catheterization: 1/2015 - no intervention  S/P femoral-popliteal bypass surgery: L and R in 2013 with graft; 5/2015 CFA angioplasty left and ileofemoral endarterectomywith vein patch angioplasty of left fem-pop bypass graft  Multiple vascular surgery both leg, left fempop bypass revision 11/2015  AV (arteriovenous fistula): Left AV graft; revision with stent placement 2-3 years ago  S/P cholecystectomy      FAMILY HISTORY:  Family history of smoking  Family history of hypertension  Family history of cancer (Sibling)      SOCIAL HISTORY:    former smoker. lives with . independent in adl[place]      REVIEW OF SYSTEMS:  See HPI, otherwise complete 10 point review of systems negative    [ ] All others negative	      PHYSICAL EXAM:  T(C): 36.8 (06-02-19 @ 05:06), Max: 37.1 (06-01-19 @ 14:18)  HR: 77 (06-02-19 @ 05:06) (71 - 78)  BP: 129/69 (06-02-19 @ 05:06) (127/73 - 171/81)  RR: 18 (06-02-19 @ 05:06) (18 - 20)  SpO2: 95% (06-02-19 @ 05:06) (95% - 98%)  Wt(kg): --  I&O's Summary    01 Jun 2019 07:01  -  02 Jun 2019 07:00  --------------------------------------------------------  IN: 200 mL / OUT: 2150 mL / NET: -1950 mL        Appearance: No Acute Distress	  HEENT:  Normal oral mucosa, PERRL, EOMI	  Cardiovascular: Normal S1 S2, No JVD, No murmurs/rubs/gallops  Respiratory: Lungs clear to auscultation bilaterally  Gastrointestinal:  Soft, Non-tender, + BS	  Skin: No rashes, No ecchymoses, No cyanosis	  Neurologic: Non-focal  Extremities: No clubbing, cyanosis or edema  Vascular: Peripheral pulses palpable 2+ bilaterally  Psychiatry: A & O x 3, Mood & affect appropriate    Laboratory Data:	 	    CBC Full  -  ( 01 Jun 2019 03:52 )  WBC Count : 6.2 K/uL  Hemoglobin : 10.6 g/dL  Hematocrit : 31.8 %  Platelet Count - Automated : 243 K/uL  Mean Cell Volume : 106.0 fl  Mean Cell Hemoglobin : 35.4 pg  Mean Cell Hemoglobin Concentration : 33.4 gm/dL  Auto Neutrophil # : 4.0 K/uL  Auto Lymphocyte # : 1.0 K/uL  Auto Monocyte # : 0.9 K/uL  Auto Eosinophil # : 0.2 K/uL  Auto Basophil # : 0.0 K/uL  Auto Neutrophil % : 65.0 %  Auto Lymphocyte % : 16.1 %  Auto Monocyte % : 15.0 %  Auto Eosinophil % : 3.2 %  Auto Basophil % : 0.6 %    06-01    137  |  89<L>  |  41<H>  ----------------------------<  42<LL>  5.2   |  32<H>  |  4.93<H>    Ca    10.6<H>      01 Jun 2019 03:52  Phos  5.8     06-01  Mg     2.3     06-01    TPro  6.5  /  Alb  4.0  /  TBili  0.2  /  DBili  x   /  AST  19  /  ALT  15  /  AlkPhos  72  06-01        Assessment:  -leg pain  -hyperkalemia  -recurrent DKA  -volume overload  -ischemic cardiomyopathy, cad s/p multiple stents  -esrd on hd  -PAD s/p prior intervention   -malfunctioning AV fistula      Recs:  -leg pain/swelling, hx of PAD. sx not consistent with CLI. most likely neuropathic - consider trial of gabapentin. no obvious infection. possibly related to edema 2/2 volume overload. c/w HD. further management of PAD as outpatient. would obtain venous duplex to r/o DVT  -awaiting avf repair  -volume overload on admission --> c/w HD to maintain euvolemia  -hypo/hyperglycemia --> f/u endo. improved  -ischemic cardiomyopathy s/p LHC with Dr Vela 2/20 --> severe and diffuse instent restenosis along RCA --> unable to stent due to multiple layers of stenting and prior brachytherapy. we can consider complex intervention if true ischemic sx develop. c/w medical treatment with anti-platelet, statins and anti-anginals for now.  c/w metop succinate 50mg daily for anti-anginal effect and for pAT/NSVT  -c/w beta blockers for nsvt/pat/cad (as above)  -hx of prolonged qtc. avoid qtc prolonging meds  -s/p recent TTE: mod-severe MR, pseudo AS (low flow-low gradient), mod-severe LV dysfunction --> recent CTS consult with dr hebert appreciated. plan is for medical management given surgical risk and patient preference  -c/w asa, plavix, statin for ischemic cardiomyopathy/CAD/PAD  -dvt ppx        Greater than 60 minutes spent on total encounter; more than 50% of the visit was spent counseling and/or coordinating care by the attending physician.   	  Julián Biswas MD   Cardiovascular Diseases  [phone]

## 2019-06-02 NOTE — PROGRESS NOTE ADULT - ASSESSMENT
62 y/o Female PMH CAD Sp PTCA w stents CHF,COPD, CVA, DMT1, ESRD on HD (M,Tu,Th,Sa), Gout, HLD, PVD, Sublcavian Stensosis (L) sp stent. PSH ASD Repair,idania,fem-pop bypass.  now presents with hypoglycemia    1- esrd  2- chf  3- hypoglycemia on admission in dialysis pt   4-  htn   5- shpt       hd am   fluid status is improving   renvela 3 tab with meals   cont with insulin as above   hydralazine 25 mg tid and lisinopril 40 mg daily

## 2019-06-02 NOTE — PROGRESS NOTE ADULT - SUBJECTIVE AND OBJECTIVE BOX
[facility]   [phone]  RENAL FOLLOW UP NOTE  --------------------------------------------------------------------------------  Chief Complaint:    24 hour events/subjective:    seen earlier.   states feels better.       PAST HISTORY  --------------------------------------------------------------------------------  No significant changes to PMH, PSH, FHx, SHx, unless otherwise noted    ALLERGIES & MEDICATIONS  --------------------------------------------------------------------------------  Allergies    No Known Allergies    Intolerances      Standing Inpatient Medications  ALBUTerol/ipratropium for Nebulization 3 milliLiter(s) Nebulizer every 6 hours  aspirin enteric coated 81 milliGRAM(s) Oral daily  clopidogrel Tablet 75 milliGRAM(s) Oral daily  dextrose 5%. 1000 milliLiter(s) IV Continuous <Continuous>  dextrose 50% Injectable 12.5 Gram(s) IV Push once  dextrose 50% Injectable 25 Gram(s) IV Push once  dextrose 50% Injectable 25 Gram(s) IV Push once  DULoxetine 60 milliGRAM(s) Oral daily  furosemide    Tablet 20 milliGRAM(s) Oral <User Schedule>  heparin  Injectable 5000 Unit(s) SubCutaneous every 12 hours  hydrALAZINE 25 milliGRAM(s) Oral three times a day  insulin glargine Injectable (LANTUS) 4 Unit(s) SubCutaneous at bedtime  insulin lispro (HumaLOG) corrective regimen sliding scale   SubCutaneous three times a day before meals  insulin lispro (HumaLOG) corrective regimen sliding scale   SubCutaneous at bedtime  insulin lispro Injectable (HumaLOG) 2 Unit(s) SubCutaneous before breakfast  insulin lispro Injectable (HumaLOG) 2 Unit(s) SubCutaneous before lunch  insulin lispro Injectable (HumaLOG) 2 Unit(s) SubCutaneous before dinner  lisinopril 40 milliGRAM(s) Oral daily  metoprolol succinate ER 50 milliGRAM(s) Oral daily  multivitamin 1 Tablet(s) Oral daily  pantoprazole    Tablet 40 milliGRAM(s) Oral before breakfast  sevelamer carbonate 1600 milliGRAM(s) Oral three times a day with meals    PRN Inpatient Medications  acetaminophen   Tablet .. 650 milliGRAM(s) Oral every 6 hours PRN  dextrose 40% Gel 15 Gram(s) Oral once PRN  glucagon  Injectable 1 milliGRAM(s) IntraMuscular once PRN  oxyCODONE    5 mG/acetaminophen 325 mG 1 Tablet(s) Oral every 6 hours PRN      REVIEW OF SYSTEMS  --------------------------------------------------------------------------------    Gen: denies fevers/chills,  CVS: denies chest pain/palpitations  Resp: denies SOB/Cough  GI: Denies N/V/Abd pain  : Denies dysuria    All other systems were reviewed and are negative, except as noted.    VITALS/PHYSICAL EXAM  --------------------------------------------------------------------------------  T(C): 36.6 (06-02-19 @ 14:34), Max: 37.1 (06-01-19 @ 21:50)  HR: 82 (06-02-19 @ 14:34) (73 - 82)  BP: 164/84 (06-02-19 @ 14:34) (127/73 - 164/84)  RR: 18 (06-02-19 @ 14:34) (18 - 19)  SpO2: 96% (06-02-19 @ 14:34) (95% - 98%)  Wt(kg): --  Height (cm): 162.56 (06-01-19 @ 22:46)  Weight (kg): 47 (06-01-19 @ 22:46)  BMI (kg/m2): 17.8 (06-01-19 @ 22:46)  BSA (m2): 1.48 (06-01-19 @ 22:46)      06-01-19 @ 07:01  -  06-02-19 @ 07:00  --------------------------------------------------------  IN: 200 mL / OUT: 2150 mL / NET: -1950 mL    06-02-19 @ 07:01  -  06-02-19 @ 15:38  --------------------------------------------------------  IN: 120 mL / OUT: 0 mL / NET: 120 mL      Physical Exam:    	Gen frail appearing   	no jvd ,  	Pulm: decrease bs  no rales or ronchi or wheezing  	CV: RRR, S1S2; no rub  	Back: No CVA tenderness; no sacral edema  	Abd: +BS, soft, nontender/nondistended  	: No suprapubic tenderness  	UE: Warm, no cyanosis  no clubbing,  no edema; no asterixis  	LE: Warm, no cyanosis  no clubbing, 2+ pitting  edema  	Neuro: alert and oriented. speech coherent   		Vascular access: + avf + bruit and thrill 	  	        LABS/STUDIES  --------------------------------------------------------------------------------              10.6   6.2   >-----------<  243      [06-01-19 @ 03:52]              31.8     137  |  89  |  41  ----------------------------<  42      [06-01-19 @ 03:52]  5.2   |  32  |  4.93        Ca     10.6     [06-01-19 @ 03:52]      Mg     2.3     [06-01-19 @ 03:52]      Phos  5.8     [06-01-19 @ 03:52]    TPro  6.5  /  Alb  4.0  /  TBili  0.2  /  DBili  x   /  AST  19  /  ALT  15  /  AlkPhos  72  [06-01-19 @ 03:52]          Creatinine Trend:  SCr 4.93 [06-01 @ 03:52]  SCr 5.93 [05-21 @ 06:20]  SCr 4.27 [05-20 @ 07:14]  SCr 2.63 [05-19 @ 06:18]  SCr 2.84 [05-18 @ 05:21]                  PTH -- (Ca 7.8)      [03-29-19 @ 20:38]   73  PTH -- (Ca 7.3)      [02-22-19 @ 02:49]   59  HbA1c 8.8      [04-30-19 @ 08:58]  TSH 0.71      [11-17-18 @ 08:25]

## 2019-06-02 NOTE — PROGRESS NOTE ADULT - PROBLEM SELECTOR PLAN 1
-test BG AC/HS  -c/w Lantus 4 units QHS  -c/w Humalog 2 units AC meals  -c/w Humalog low correction scale AC and Low HS scale  Outpt. endo follow-up w/ Dr. Ley  discussed w/pt and medicine NP  pager: [phone]

## 2019-06-02 NOTE — PROGRESS NOTE ADULT - ASSESSMENT
62yo F h/o DM1 w frequent hospitalizations for DKA, ESRD on HD (LUE AVF, M/T/Th/Sa, last HD currently), CAD s/p  stents, HTN HLD, CVA, PVD presents with hypoglycemia as a result of accidental overdose of long acting insulin at night. Tolerating POs w/improvement in glucose values over past 24 hours. Pt w/cognitive deficits leading to frequent admissions and mismanagement of diabetes. Family assistance will be required to manage DM w/out these incidents occurring in the future.  BG goal (100-200mg/dl).

## 2019-06-02 NOTE — PROGRESS NOTE ADULT - ASSESSMENT
63 f with  IDDM type 1 and hypoglycemia   - BS control  - endocrine evaluation noted    ESRD  - HD  - nephrology evaluation Dr. Schmidt    CAD (coronary artery disease) s/p stents  - stable  - continue Rx    CHF (congestive heart failure)   - cardiology evaluation dr. Biswas    COPD (chronic obstructive pulmonary disease)   - nebs prn    HTN control    CVA   - supportive care     Depression/ Anxiety  - continue Rx    Edema/ leg pain  - LED r/o DVT    DVT/GI prphylaxis    DCP home.  Pierce iVera MD pager [phone]

## 2019-06-03 ENCOUNTER — TRANSCRIPTION ENCOUNTER (OUTPATIENT)
Age: 64
End: 2019-06-03

## 2019-06-03 LAB
ALBUMIN SERPL ELPH-MCNC: 3.8 G/DL — SIGNIFICANT CHANGE UP (ref 3.3–5)
ALP SERPL-CCNC: 67 U/L — SIGNIFICANT CHANGE UP (ref 40–120)
ALT FLD-CCNC: 18 U/L — SIGNIFICANT CHANGE UP (ref 10–45)
ANION GAP SERPL CALC-SCNC: 16 MMOL/L — SIGNIFICANT CHANGE UP (ref 5–17)
AST SERPL-CCNC: 23 U/L — SIGNIFICANT CHANGE UP (ref 10–40)
BASOPHILS # BLD AUTO: 0.04 K/UL — SIGNIFICANT CHANGE UP (ref 0–0.2)
BASOPHILS NFR BLD AUTO: 1 % — SIGNIFICANT CHANGE UP (ref 0–2)
BILIRUB SERPL-MCNC: 0.2 MG/DL — SIGNIFICANT CHANGE UP (ref 0.2–1.2)
BUN SERPL-MCNC: 28 MG/DL — HIGH (ref 7–23)
CALCIUM SERPL-MCNC: 9.9 MG/DL — SIGNIFICANT CHANGE UP (ref 8.4–10.5)
CHLORIDE SERPL-SCNC: 92 MMOL/L — LOW (ref 96–108)
CO2 SERPL-SCNC: 25 MMOL/L — SIGNIFICANT CHANGE UP (ref 22–31)
CREAT SERPL-MCNC: 4.72 MG/DL — HIGH (ref 0.5–1.3)
DRUG SCREEN, SERUM: SIGNIFICANT CHANGE UP
EOSINOPHIL # BLD AUTO: 0.05 K/UL — SIGNIFICANT CHANGE UP (ref 0–0.5)
EOSINOPHIL NFR BLD AUTO: 1.3 % — SIGNIFICANT CHANGE UP (ref 0–6)
GLUCOSE BLDC GLUCOMTR-MCNC: 102 MG/DL — HIGH (ref 70–99)
GLUCOSE BLDC GLUCOMTR-MCNC: 147 MG/DL — HIGH (ref 70–99)
GLUCOSE BLDC GLUCOMTR-MCNC: 257 MG/DL — HIGH (ref 70–99)
GLUCOSE BLDC GLUCOMTR-MCNC: 270 MG/DL — HIGH (ref 70–99)
GLUCOSE BLDC GLUCOMTR-MCNC: 95 MG/DL — SIGNIFICANT CHANGE UP (ref 70–99)
GLUCOSE SERPL-MCNC: 257 MG/DL — HIGH (ref 70–99)
HCT VFR BLD CALC: 29.8 % — LOW (ref 34.5–45)
HGB BLD-MCNC: 9.4 G/DL — LOW (ref 11.5–15.5)
IMM GRANULOCYTES NFR BLD AUTO: 0.3 % — SIGNIFICANT CHANGE UP (ref 0–1.5)
LYMPHOCYTES # BLD AUTO: 0.83 K/UL — LOW (ref 1–3.3)
LYMPHOCYTES # BLD AUTO: 20.9 % — SIGNIFICANT CHANGE UP (ref 13–44)
MCHC RBC-ENTMCNC: 31.5 GM/DL — LOW (ref 32–36)
MCHC RBC-ENTMCNC: 34.1 PG — HIGH (ref 27–34)
MCV RBC AUTO: 108 FL — HIGH (ref 80–100)
MONOCYTES # BLD AUTO: 0.5 K/UL — SIGNIFICANT CHANGE UP (ref 0–0.9)
MONOCYTES NFR BLD AUTO: 12.6 % — SIGNIFICANT CHANGE UP (ref 2–14)
NEUTROPHILS # BLD AUTO: 2.55 K/UL — SIGNIFICANT CHANGE UP (ref 1.8–7.4)
NEUTROPHILS NFR BLD AUTO: 63.9 % — SIGNIFICANT CHANGE UP (ref 43–77)
PLATELET # BLD AUTO: 190 K/UL — SIGNIFICANT CHANGE UP (ref 150–400)
POTASSIUM SERPL-MCNC: 5.9 MMOL/L — HIGH (ref 3.5–5.3)
POTASSIUM SERPL-SCNC: 5.9 MMOL/L — HIGH (ref 3.5–5.3)
PROT SERPL-MCNC: 6.1 G/DL — SIGNIFICANT CHANGE UP (ref 6–8.3)
RBC # BLD: 2.76 M/UL — LOW (ref 3.8–5.2)
RBC # FLD: 14.8 % — HIGH (ref 10.3–14.5)
SODIUM SERPL-SCNC: 133 MMOL/L — LOW (ref 135–145)
WBC # BLD: 3.98 K/UL — SIGNIFICANT CHANGE UP (ref 3.8–10.5)
WBC # FLD AUTO: 3.98 K/UL — SIGNIFICANT CHANGE UP (ref 3.8–10.5)

## 2019-06-03 PROCEDURE — 99232 SBSQ HOSP IP/OBS MODERATE 35: CPT

## 2019-06-03 RX ORDER — INSULIN LISPRO 100/ML
1 VIAL (ML) SUBCUTANEOUS
Qty: 1 | Refills: 0
Start: 2019-06-03

## 2019-06-03 RX ORDER — INSULIN LISPRO 100/ML
VIAL (ML) SUBCUTANEOUS
Refills: 0 | Status: DISCONTINUED | OUTPATIENT
Start: 2019-06-03 | End: 2019-06-04

## 2019-06-03 RX ORDER — INSULIN LISPRO 100/ML
2 VIAL (ML) SUBCUTANEOUS
Qty: 0 | Refills: 0 | DISCHARGE

## 2019-06-03 RX ORDER — INSULIN LISPRO 100/ML
2 VIAL (ML) SUBCUTANEOUS
Qty: 1 | Refills: 0
Start: 2019-06-03 | End: 2019-07-02

## 2019-06-03 RX ORDER — INSULIN LISPRO 100/ML
1 VIAL (ML) SUBCUTANEOUS
Qty: 1 | Refills: 0
Start: 2019-06-03 | End: 2019-07-02

## 2019-06-03 RX ADMIN — Medication 1 TABLET(S): at 12:39

## 2019-06-03 RX ADMIN — Medication 2 UNIT(S): at 08:31

## 2019-06-03 RX ADMIN — DULOXETINE HYDROCHLORIDE 60 MILLIGRAM(S): 30 CAPSULE, DELAYED RELEASE ORAL at 12:39

## 2019-06-03 RX ADMIN — Medication 20 MILLIGRAM(S): at 09:58

## 2019-06-03 RX ADMIN — Medication 50 MILLIGRAM(S): at 05:24

## 2019-06-03 RX ADMIN — Medication 25 MILLIGRAM(S): at 21:52

## 2019-06-03 RX ADMIN — Medication 25 MILLIGRAM(S): at 05:24

## 2019-06-03 RX ADMIN — SEVELAMER CARBONATE 1600 MILLIGRAM(S): 2400 POWDER, FOR SUSPENSION ORAL at 18:11

## 2019-06-03 RX ADMIN — SEVELAMER CARBONATE 1600 MILLIGRAM(S): 2400 POWDER, FOR SUSPENSION ORAL at 12:39

## 2019-06-03 RX ADMIN — HEPARIN SODIUM 5000 UNIT(S): 5000 INJECTION INTRAVENOUS; SUBCUTANEOUS at 05:24

## 2019-06-03 RX ADMIN — LISINOPRIL 40 MILLIGRAM(S): 2.5 TABLET ORAL at 05:24

## 2019-06-03 RX ADMIN — PANTOPRAZOLE SODIUM 40 MILLIGRAM(S): 20 TABLET, DELAYED RELEASE ORAL at 06:05

## 2019-06-03 RX ADMIN — Medication 3: at 08:31

## 2019-06-03 RX ADMIN — CLOPIDOGREL BISULFATE 75 MILLIGRAM(S): 75 TABLET, FILM COATED ORAL at 12:39

## 2019-06-03 RX ADMIN — Medication 2 UNIT(S): at 18:12

## 2019-06-03 RX ADMIN — Medication 81 MILLIGRAM(S): at 12:39

## 2019-06-03 RX ADMIN — Medication 2 UNIT(S): at 12:39

## 2019-06-03 RX ADMIN — Medication 3 MILLILITER(S): at 23:49

## 2019-06-03 RX ADMIN — Medication 3 MILLILITER(S): at 05:24

## 2019-06-03 RX ADMIN — OXYCODONE AND ACETAMINOPHEN 1 TABLET(S): 5; 325 TABLET ORAL at 23:48

## 2019-06-03 RX ADMIN — SEVELAMER CARBONATE 1600 MILLIGRAM(S): 2400 POWDER, FOR SUSPENSION ORAL at 08:31

## 2019-06-03 RX ADMIN — INSULIN GLARGINE 4 UNIT(S): 100 INJECTION, SOLUTION SUBCUTANEOUS at 21:52

## 2019-06-03 RX ADMIN — Medication 3 MILLILITER(S): at 18:12

## 2019-06-03 NOTE — DISCHARGE NOTE PROVIDER - CARE PROVIDER_API CALL
Daisy Burger (DO)  Nephrology  [address] [address]  Phone: [phone]  Fax: [phone]  Follow Up Time:     Arsalan Castro)  Internal Medicine  [address] [place] [address]  Phone: [phone]  Fax: [phone]  Follow Up Time:

## 2019-06-03 NOTE — CHART NOTE - NSCHARTNOTEFT_GEN_A_CORE
NP note -  unable to discharge patient today after HD 2/2 VNS not set up.  Attending decided very late that the patient could go home, however, CM was gone for the day.  VNS needs to be set up for diabetic teaching as the patient came in for hypoglycemia .  Will inform CM first thing in the morning, discussed with pt that she can be discharge by 9 am.  patient agreeable with this plan.  CRISTOBAL Adams NP [id number]

## 2019-06-03 NOTE — DISCHARGE NOTE NURSING/CASE MANAGEMENT/SOCIAL WORK - NSDCPEWEB_GEN_ALL_CORE
[facility] for Tobacco Control website --- [email or web address] website --- [email or web address]

## 2019-06-03 NOTE — DISCHARGE NOTE NURSING/CASE MANAGEMENT/SOCIAL WORK - NSDCPEEMAIL_GEN_ALL_CORE
[facility] for Tobacco Control email [email or web address]

## 2019-06-03 NOTE — PROGRESS NOTE ADULT - ASSESSMENT
62 y/o Female PMH CAD Sp PTCA w stents CHF,COPD, CVA, DMT1, ESRD on HD (M,Tu,Th,Sa), Gout, HLD, PVD, Sublcavian Stensosis (L) sp stent. PSH ASD Repair,idania,fem-pop bypass.  now presents with hypoglycemia    1- esrd  2- chf  3- hypoglycemia on admission in dialysis pt   4-  htn   5- shpt     hd 3.5 hr 2 k 2.5 liter bfr 400 dfr 600 revaclear 300

## 2019-06-03 NOTE — PROGRESS NOTE ADULT - SUBJECTIVE AND OBJECTIVE BOX
[facility]   [phone]  DIALYSIS NOTE  Chief Complaint: ESRD/Ongoing hemodialysis requirement.     24 hour events/subjective:    states feels well no new c/o by pt         ALLERGIES & MEDICATIONS  --------------------------------------------------------------------------------  Allergies    No Known Allergies    Intolerances      Standing Inpatient Medications  ALBUTerol/ipratropium for Nebulization 3 milliLiter(s) Nebulizer every 6 hours  aspirin enteric coated 81 milliGRAM(s) Oral daily  clopidogrel Tablet 75 milliGRAM(s) Oral daily  dextrose 5%. 1000 milliLiter(s) IV Continuous <Continuous>  dextrose 50% Injectable 12.5 Gram(s) IV Push once  dextrose 50% Injectable 25 Gram(s) IV Push once  dextrose 50% Injectable 25 Gram(s) IV Push once  DULoxetine 60 milliGRAM(s) Oral daily  furosemide    Tablet 20 milliGRAM(s) Oral <User Schedule>  heparin  Injectable 5000 Unit(s) SubCutaneous every 12 hours  hydrALAZINE 25 milliGRAM(s) Oral three times a day  insulin glargine Injectable (LANTUS) 4 Unit(s) SubCutaneous at bedtime  insulin lispro (HumaLOG) corrective regimen sliding scale   SubCutaneous three times a day before meals  insulin lispro (HumaLOG) corrective regimen sliding scale   SubCutaneous at bedtime  insulin lispro Injectable (HumaLOG) 2 Unit(s) SubCutaneous before breakfast  insulin lispro Injectable (HumaLOG) 2 Unit(s) SubCutaneous before lunch  insulin lispro Injectable (HumaLOG) 2 Unit(s) SubCutaneous before dinner  lisinopril 40 milliGRAM(s) Oral daily  metoprolol succinate ER 50 milliGRAM(s) Oral daily  multivitamin 1 Tablet(s) Oral daily  pantoprazole    Tablet 40 milliGRAM(s) Oral before breakfast  sevelamer carbonate 1600 milliGRAM(s) Oral three times a day with meals    PRN Inpatient Medications  acetaminophen   Tablet .. 650 milliGRAM(s) Oral every 6 hours PRN  dextrose 40% Gel 15 Gram(s) Oral once PRN  glucagon  Injectable 1 milliGRAM(s) IntraMuscular once PRN  oxyCODONE    5 mG/acetaminophen 325 mG 1 Tablet(s) Oral every 6 hours PRN      REVIEW OF SYSTEMS  --------------------------------------------------------------------------------  no itching or rash  no fever or chill  no cp or palp   no sob or cough   no N/V/D/ no abd pain   ext +  edema no pain         VITALS/PHYSICAL EXAM  --------------------------------------------------------------------------------  T(C): 36.5 (06-03-19 @ 04:58), Max: 36.6 (06-02-19 @ 14:34)  HR: 75 (06-03-19 @ 04:58) (75 - 83)  BP: 138/77 (06-03-19 @ 04:58) (138/77 - 164/84)  RR: 17 (06-03-19 @ 04:58) (17 - 18)  SpO2: 98% (06-03-19 @ 04:58) (96% - 98%)  Wt(kg): --  Height (cm): 162.56 (06-01-19 @ 22:46)  Weight (kg): 47 (06-01-19 @ 22:46)  BMI (kg/m2): 17.8 (06-01-19 @ 22:46)  BSA (m2): 1.48 (06-01-19 @ 22:46)      06-02-19 @ 07:01  -  06-03-19 @ 07:00  --------------------------------------------------------  IN: 520 mL / OUT: 100 mL / NET: 420 mL    06-03-19 @ 07:01  -  06-03-19 @ 14:32  --------------------------------------------------------  IN: 180 mL / OUT: 0 mL / NET: 180 mL      Physical Exam:  		  	Gen frail appearing   	no jvd ,  	Pulm: decrease bs  no rales or ronchi or wheezing  	CV: RRR, S1S2; no rub  	Back: No CVA tenderness; no sacral edema  	Abd: +BS, soft, nontender/nondistended  	: No suprapubic tenderness  	UE: Warm, no cyanosis  no clubbing,  no edema; no asterixis  	LE: Warm, no cyanosis  no clubbing, 2+ pitting  edema  	Neuro: alert and oriented. speech coherent   		Vascular access: + avf + bruit and thrill 	  		      LABS/STUDIES  --------------------------------------------------------------------------------              9.4    3.98  >-----------<  190      [06-03-19 @ 08:23]              29.8     133  |  92  |  28  ----------------------------<  257      [06-03-19 @ 06:47]  5.9   |  25  |  4.72        Ca     9.9     [06-03-19 @ 06:47]    TPro  6.1  /  Alb  3.8  /  TBili  0.2  /  DBili  x   /  AST  23  /  ALT  18  /  AlkPhos  67  [06-03-19 @ 06:47]                  imp/suggest: ESRD      Hemodialysis Prescription:  	Access:  	Dialyzer: revaclear   	Blood Flow (mL/Min): 400  	Dialysate Flow (mL/Min): 600  	Target UF (Liters):  	Treatment Time:  	Potassium:   	Calcium: 2.5  	  YOLANDA    Vitamin D     continue with hd   see hd flow sheet

## 2019-06-03 NOTE — PROGRESS NOTE ADULT - ASSESSMENT
63 f with  IDDM type 1 and hypoglycemia   - BS control  - endocrine folllow.    ESRD  - HD  - nephrology follow Dr. Schmidt    CAD (coronary artery disease) s/p stents  - stable  - continue Rx    CHF (congestive heart failure)   - cardiology evaluation dr. Biswas    COPD (chronic obstructive pulmonary disease)   - nebs prn    HTN control    CVA   - supportive care     Depression/ Anxiety  - continue Rx    Edema/ leg pain  - LED r/o DVT    DVT/GI prphylaxis    DC home. Follow with PMD/ Nephrology/ Endocrinology in 2-3 days. QA  Pierce Viera MD pager [phone]

## 2019-06-03 NOTE — PROGRESS NOTE ADULT - ASSESSMENT
62yo F h/o DM1 w frequent hospitalizations for DKA, ESRD on HD (PEPEE AVF, M/T/Th/Sa, last HD currently), CAD s/p  stents, HTN HLD, CVA, PVD presents with hypoglycemia as a result of accidental overdose of basal insulin. Tolerating POs w/improvement in glucose values. Noted BG in 90s ac lunch today. Pt reported having breakfast and wants to go home. Pt w/cognitive deficits leading to frequent admissions and mismanagement of diabetes. Family assistance will be required to manage DM w/out these incidents occurring in the future.  BG goal (100-200mg/dl). 64yo F h/o DM1 w frequent hospitalizations for DKA, ESRD on HD (PEPEE AVF, M/T/Th/Sa, last HD currently), CAD s/p  stents, HTN HLD, CVA, PVD presents with hypoglycemia as a result of accidental overdose of basal insulin. Tolerating POs w/improvement in glucose values. Noted BG in 90s ac lunch today. Pt reported having breakfast and wants to go home. Pt w/cognitive deficits leading to frequent admissions and mismanagement of diabetes. Family assistance will be required to manage DM w/out these incidents occurring in the future.  BG goal (100-200mg/dl).   Spoke to team about home care visits to make sure pt is following insulin regimen as prescribed and family is participating in pt's care but supervising pt with insulin doses.

## 2019-06-03 NOTE — DISCHARGE NOTE PROVIDER - HOSPITAL COURSE
Diabetes Follow up note: Saw pt earlier today    Interval Hx: 64 yo female with uncontrolled type 1 diabetes (well known from multiple prior admissions) DM c/b ESRD on HD, neuropathy, CAD, CVA, CHF and multiple cardiac stents presents w/hypoglycemia. Pt states that she believes she took her basal insulin twice the night before to admission. BG values up to 200s with BG 90s before lunch. Pt states she wants to go home. Reports feeling better and tolerating POs. No further hypoglycemia Diabetes Follow up note: Saw pt earlier today    Interval Hx: 64 yo female with uncontrolled type 1 diabetes (well known from multiple prior admissions) DM c/b ESRD on HD, neuropathy, CAD, CVA, CHF and multiple cardiac stents presents w/hypoglycemia. Pt states that she believes she took her basal insulin twice the night before to admission. BG values up to 200s with BG 90s before lunch. Pt states she wants to go home. Reports feeling better and tolerating POs. No further hypoglycemia      Problem: Type 1 diabetes mellitus with hypoglycemia and without coma.  Plan: -test BG AC/HS    -c/w Lantus 4 units QHS    -c/w Humalog 2 units AC meals    -Adjusted Humalog correction scale AC meals to prevent sudden drops on BG when higher doses are given. C/w Low HS correction scale    -Outpt. endo follow-up w/ Dr. Ley    -Plan discussed with pt/team. Home care visits due to recent insulin overdose

## 2019-06-03 NOTE — PROGRESS NOTE ADULT - SUBJECTIVE AND OBJECTIVE BOX
Cardiovascular Disease Progress Note    Overnight events: No acute events overnight.  no cp/sob/palps/dizziness. persistent edema  Otherwise review of systems negative    Objective Findings:  T(C): 36.5 (19 @ 04:58), Max: 36.6 (19 @ 14:34)  HR: 75 (19 @ 04:58) (75 - 83)  BP: 138/77 (19 @ 04:58) (138/77 - 164/84)  RR: 17 (19 @ 04:58) (17 - 18)  SpO2: 98% (19 @ 04:58) (96% - 98%)  Wt(kg): --  Daily     Daily Weight in k.8 (2019 04:42)      Physical Exam:  Gen: NAD  HEENT: EOMI  CV: RRR, normal S1 + S2, no m/r/g  Lungs: CTAB  Abd: soft, non-tender  Ext: No edema      Laboratory Data:        133<L>  |  92<L>  |  28<H>  ----------------------------<  257<H>  5.9<H>   |  25  |  4.72<H>    Ca    9.9      2019 06:47    TPro  6.1  /  Alb  3.8  /  TBili  0.2  /  DBili  x   /  AST  23  /  ALT  18  /  AlkPhos  67  06-03              Inpatient Medications:  MEDICATIONS  (STANDING):  ALBUTerol/ipratropium for Nebulization 3 milliLiter(s) Nebulizer every 6 hours  aspirin enteric coated 81 milliGRAM(s) Oral daily  clopidogrel Tablet 75 milliGRAM(s) Oral daily  dextrose 5%. 1000 milliLiter(s) (50 mL/Hr) IV Continuous <Continuous>  dextrose 50% Injectable 12.5 Gram(s) IV Push once  dextrose 50% Injectable 25 Gram(s) IV Push once  dextrose 50% Injectable 25 Gram(s) IV Push once  DULoxetine 60 milliGRAM(s) Oral daily  furosemide    Tablet 20 milliGRAM(s) Oral <User Schedule>  heparin  Injectable 5000 Unit(s) SubCutaneous every 12 hours  hydrALAZINE 25 milliGRAM(s) Oral three times a day  insulin glargine Injectable (LANTUS) 4 Unit(s) SubCutaneous at bedtime  insulin lispro (HumaLOG) corrective regimen sliding scale   SubCutaneous three times a day before meals  insulin lispro (HumaLOG) corrective regimen sliding scale   SubCutaneous at bedtime  insulin lispro Injectable (HumaLOG) 2 Unit(s) SubCutaneous before breakfast  insulin lispro Injectable (HumaLOG) 2 Unit(s) SubCutaneous before lunch  insulin lispro Injectable (HumaLOG) 2 Unit(s) SubCutaneous before dinner  lisinopril 40 milliGRAM(s) Oral daily  metoprolol succinate ER 50 milliGRAM(s) Oral daily  multivitamin 1 Tablet(s) Oral daily  pantoprazole    Tablet 40 milliGRAM(s) Oral before breakfast  sevelamer carbonate 1600 milliGRAM(s) Oral three times a day with meals      Assessment:  -leg pain  -hyperkalemia  -recurrent DKA  -volume overload  -ischemic cardiomyopathy, cad s/p multiple stents  -esrd on hd  -PAD s/p prior intervention   -malfunctioning AV fistula      Recs:  -leg pain/swelling, hx of PAD. sx not consistent with CLI. most likely neuropathic - consider trial of gabapentin. no obvious infection. possibly related to edema 2/2 volume overload. c/w HD. further management of PAD as outpatient. would obtain venous duplex to r/o DVT  -awaiting avf repair  -volume overload on admission --> c/w HD to maintain euvolemia  -hypo/hyperglycemia --> f/u endo. improved  -ischemic cardiomyopathy s/p LHC with Dr Vela  --> severe and diffuse instent restenosis along RCA --> unable to stent due to multiple layers of stenting and prior brachytherapy. we can consider complex intervention if true ischemic sx develop. c/w medical treatment with anti-platelet, statins and anti-anginals for now.  c/w metop succinate 50mg daily for anti-anginal effect and for pAT/NSVT  -c/w beta blockers for nsvt/pat/cad (as above)  -hx of prolonged qtc. avoid qtc prolonging meds  -s/p recent TTE: mod-severe MR, pseudo AS (low flow-low gradient), mod-severe LV dysfunction --> recent CTS consult with dr anup donaldson. plan is for medical management given surgical risk and patient preference  -c/w asa, plavix, statin for ischemic cardiomyopathy/CAD/PAD  -dvt ppx        Over 25 minutes spent on total encounter; more than 50% of the visit was spent counseling and/or coordinating care by the attending physician.      Julián Biswas MD   Cardiovascular Disease  [phone]

## 2019-06-03 NOTE — PROGRESS NOTE ADULT - PROBLEM SELECTOR PLAN 1
-test BG AC/HS  -c/w Lantus 4 units QHS  -c/w Humalog 2 units AC meals  -Adjusted Humalog correction scale AC meals to prevent sudden drops on BG when higher doses are given. C/w Low HS correction scale  -Outpt. endo follow-up w/ Dr. Ley  -Plan discussed with pt/team.  Contact info: [phone] (24/7). pager [phone] -test BG AC/HS  -c/w Lantus 4 units QHS  -c/w Humalog 2 units AC meals  -Adjusted Humalog correction scale AC meals to prevent sudden drops on BG when higher doses are given. C/w Low HS correction scale  -Outpt. endo follow-up w/ Dr. Ley  -Plan discussed with pt/team. Home care visits due to recent insulin overdose.  Contact info: [phone] (24/7). pager [phone]

## 2019-06-03 NOTE — PROGRESS NOTE ADULT - SUBJECTIVE AND OBJECTIVE BOX
Patient is a 63y old  Female who presents with a chief complaint of weakness (03 Jun 2019 15:09)      SUBJECTIVE / OVERNIGHT EVENTS: Comfortable without new complaints.   Review of Systems  chest pain no  palpitations no  sob no  nausea no  headache no    MEDICATIONS  (STANDING):  ALBUTerol/ipratropium for Nebulization 3 milliLiter(s) Nebulizer every 6 hours  aspirin enteric coated 81 milliGRAM(s) Oral daily  clopidogrel Tablet 75 milliGRAM(s) Oral daily  dextrose 5%. 1000 milliLiter(s) (50 mL/Hr) IV Continuous <Continuous>  dextrose 50% Injectable 12.5 Gram(s) IV Push once  dextrose 50% Injectable 25 Gram(s) IV Push once  dextrose 50% Injectable 25 Gram(s) IV Push once  DULoxetine 60 milliGRAM(s) Oral daily  furosemide    Tablet 20 milliGRAM(s) Oral <User Schedule>  heparin  Injectable 5000 Unit(s) SubCutaneous every 12 hours  hydrALAZINE 25 milliGRAM(s) Oral three times a day  insulin glargine Injectable (LANTUS) 4 Unit(s) SubCutaneous at bedtime  insulin lispro (HumaLOG) corrective regimen sliding scale   SubCutaneous at bedtime  insulin lispro (HumaLOG) corrective regimen sliding scale   SubCutaneous three times a day before meals  insulin lispro Injectable (HumaLOG) 2 Unit(s) SubCutaneous before breakfast  insulin lispro Injectable (HumaLOG) 2 Unit(s) SubCutaneous before lunch  insulin lispro Injectable (HumaLOG) 2 Unit(s) SubCutaneous before dinner  lisinopril 40 milliGRAM(s) Oral daily  metoprolol succinate ER 50 milliGRAM(s) Oral daily  multivitamin 1 Tablet(s) Oral daily  pantoprazole    Tablet 40 milliGRAM(s) Oral before breakfast  sevelamer carbonate 1600 milliGRAM(s) Oral three times a day with meals    MEDICATIONS  (PRN):  acetaminophen   Tablet .. 650 milliGRAM(s) Oral every 6 hours PRN Temp greater or equal to 38.5C (101.3F), Mild Pain (1 - 3)  dextrose 40% Gel 15 Gram(s) Oral once PRN Blood Glucose LESS THAN 70 milliGRAM(s)/deciliter  glucagon  Injectable 1 milliGRAM(s) IntraMuscular once PRN Glucose LESS THAN 70 milligrams/deciliter  oxyCODONE    5 mG/acetaminophen 325 mG 1 Tablet(s) Oral every 6 hours PRN Moderate Pain (4 - 6)      Vital Signs Last 24 Hrs  T(C): 37.1 (03 Jun 2019 14:43), Max: 37.1 (03 Jun 2019 14:43)  T(F): 98.7 (03 Jun 2019 14:43), Max: 98.7 (03 Jun 2019 14:43)  HR: 74 (03 Jun 2019 14:43) (74 - 83)  BP: 133/76 (03 Jun 2019 14:43) (133/76 - 152/80)  BP(mean): --  RR: 18 (03 Jun 2019 14:43) (17 - 18)  SpO2: 96% (03 Jun 2019 14:43) (96% - 98%)    PHYSICAL EXAM:  GENERAL: NAD, well-developed  HEAD:  Atraumatic, Normocephalic  EYES: EOMI, PERRLA, conjunctiva and sclera clear  NECK: Supple, No JVD  CHEST/LUNG: Clear to auscultation bilaterally; No wheeze  HEART: Regular rate and rhythm; No murmurs, rubs, or gallops  ABDOMEN: Soft, Nontender, Nondistended; Bowel sounds present  EXTREMITIES:  2+ Peripheral Pulses, No clubbing, cyanosis, or edema  PSYCH: AAOx3  NEUROLOGY: non-focal  SKIN: No rashes or lesions    LABS:                        9.4    3.98  )-----------( 190      ( 03 Jun 2019 08:23 )             29.8     06-03    133<L>  |  92<L>  |  28<H>  ----------------------------<  257<H>  5.9<H>   |  25  |  4.72<H>    Ca    9.9      03 Jun 2019 06:47    TPro  6.1  /  Alb  3.8  /  TBili  0.2  /  DBili  x   /  AST  23  /  ALT  18  /  AlkPhos  67  06-03                RADIOLOGY & ADDITIONAL TESTS:    Imaging Personally Reviewed:    Consultant(s) Notes Reviewed:      Care Discussed with Consultants/Other Providers:

## 2019-06-03 NOTE — DISCHARGE NOTE NURSING/CASE MANAGEMENT/SOCIAL WORK - NSDCPEPT PROEDHF_GEN_ALL_CORE
Monitor weight daily/Activities as tolerated/Report signs and symptoms to primary care provider/Call primary care provider for follow up after discharge/Low salt diet

## 2019-06-03 NOTE — PROGRESS NOTE ADULT - SUBJECTIVE AND OBJECTIVE BOX
Diabetes Follow up note: Saw pt earlier today  Interval Hx: 64 yo female with uncontrolled type 1 diabetes (well known from multiple prior admissions) DM c/b ESRD on HD, neuropathy, CAD, CVA, CHF and multiple cardiac stents presents w/hypoglycemia. Pt states that she believes she took her basal insulin twice the night before to admission. BG values up to 200s with BG 90s before lunch. Pt states she wants to go home. Reports feeling better and tolerating POs. No further hypoglycemia.      Review of Systems:  General: as above.   GI: Tolerating POs without any N/V/D/ABD PAIN.  CV: No CP/SOB  ENDO: No S&Sx of hypoglycemia    MEDS:  insulin glargine Injectable (LANTUS) 4 Unit(s) SubCutaneous at bedtime  insulin lispro (HumaLOG) corrective regimen sliding scale   SubCutaneous three times a day before meals  insulin lispro (HumaLOG) corrective regimen sliding scale   SubCutaneous at bedtime  insulin lispro Injectable (HumaLOG) 2 Unit(s) SubCutaneous before breakfast  insulin lispro Injectable (HumaLOG) 2 Unit(s) SubCutaneous before lunch  insulin lispro Injectable (HumaLOG) 2 Unit(s) SubCutaneous before dinner      Allergies    No Known Allergies      PE:  General: Female laying in bed in NAD.   Vital Signs Last 24 Hrs  T(C): 37.1 (06-03-19 @ 14:43), Max: 37.1 (06-03-19 @ 14:43)  T(F): 98.7 (06-03-19 @ 14:43), Max: 98.7 (06-03-19 @ 14:43)  HR: 74 (06-03-19 @ 14:43) (74 - 83)  BP: 133/76 (06-03-19 @ 14:43) (133/76 - 152/80)  BP(mean): --  RR: 18 (06-03-19 @ 14:43) (17 - 18)  SpO2: 96% (06-03-19 @ 14:43) (96% - 98%)  Height (cm): 162.56 (06-01-19 @ 22:46)  Weight (kg): 47 (06-01-19 @ 22:46)  BMI (kg/m2): 17.8 (06-01-19 @ 22:46)  Abd: Soft, NT, ND,   Extremities: Warm. L AV fistula intact. Trace edema in LEs. L>R   Neuro: A&O X3    LABS:  POCT Blood Glucose.: 95 mg/dL (06-03-19 @ 11:59)  POCT Blood Glucose.: 257 mg/dL (06-03-19 @ 07:58)  POCT Blood Glucose.: 275 mg/dL (06-02-19 @ 22:09)  POCT Blood Glucose.: 333 mg/dL (06-02-19 @ 18:10)  POCT Blood Glucose.: 275 mg/dL (06-02-19 @ 17:06)  POCT Blood Glucose.: 194 mg/dL (06-02-19 @ 12:28)  POCT Blood Glucose.: 130 mg/dL (06-02-19 @ 08:39)  POCT Blood Glucose.: 154 mg/dL (06-01-19 @ 22:54)  POCT Blood Glucose.: 212 mg/dL (06-01-19 @ 19:23)  POCT Blood Glucose.: 283 mg/dL (06-01-19 @ 17:54)  POCT Blood Glucose.: 341 mg/dL (06-01-19 @ 16:26)                          9.4    3.98  )-----------( 190      ( 03 Jun 2019 08:23 )             29.8     06-03    133<L>  |  92<L>  |  28<H>  ----------------------------<  257<H>  5.9<H>   |  25  |  4.72<H>    Ca    9.9      03 Jun 2019 06:47    TPro  6.1  /  Alb  3.8  /  TBili  0.2  /  DBili  x   /  AST  23  /  ALT  18  /  AlkPhos  67  06-03    Hemoglobin A1C, Whole Blood: 8.8 % <H> [4.0 - 5.6] (04-30-19 @ 08:58)

## 2019-06-03 NOTE — DISCHARGE NOTE NURSING/CASE MANAGEMENT/SOCIAL WORK - NSDCDPATPORTLINK_GEN_ALL_CORE
You can access the [employer] Patient Portal, offered by [facility], by registering with the following website: [email or web address]

## 2019-06-03 NOTE — DISCHARGE NOTE PROVIDER - NSDCCPCAREPLAN_GEN_ALL_CORE_FT
PRINCIPAL DISCHARGE DIAGNOSIS  Diagnosis: Hypoglycemia  Assessment and Plan of Treatment: pt incorectly dosed her insulin  seen by house endocrine team and recommendations given  to follow up with Dr. Ley within 1 week of discharge  VNS for review of medication regimen      SECONDARY DISCHARGE DIAGNOSES  Diagnosis: Chronic CHF  Assessment and Plan of Treatment: Weigh yourself daily.  If you gain 3lbs in 3 days, or 5lbs in a week call your Health Care Provider.  Do not eat or drink foods containing more than 2000mg of salt (sodium) in your diet every day.  Call your Health Care Provider if you have any swelling or increased swelling in your feet, ankles, and/or stomach.  Take all of your medication as directed.  If you become dizzy call your Health Care Provider.      Diagnosis: ESRD on dialysis  Assessment and Plan of Treatment: follow up with Dr. Burger  continue current HD regimen    Diagnosis: Type 1 diabetes mellitus with hypoglycemia and without coma  Assessment and Plan of Treatment: Type 1 diabetes mellitus with hypoglycemia and without coma  HgA1C this admission.  Make sure you get your HgA1c checked every three months.  If you take oral diabetes medications, check your blood glucose two times a day.  If you take insulin, check your blood glucose before meals and at bedtime.  It's important not to skip any meals.  Keep a log of your blood glucose results and always take it with you to your doctor appointments.  Keep a list of your current medications including injectables and over the counter medications and bring this medication list with you to all your doctor appointments.  If you have not seen your ophthalmologist this year call for appointment.  Check your feet daily for redness, sores, or openings. Do not self treat. If no improvement in two days call your primary care physician for an appointment.  Low blood sugar (hypoglycemia) is a blood sugar below 70mg/dl. Check your blood sugar if you feel signs/symptoms of hypoglycemia. If your blood sugar is below 70 take 15 grams of carbohydrates (ex 4 oz of apple juice, 3-4 glucose tablets, or 4-6 oz of regular soda) wait 15 minutes and repeat blood sugar to make sure it comes up above 70.  If your blood sugar is above 70 and you are due for a meal, have a meal.  If you are not due for a meal have a snack.  This snack helps keeps your blood sugar at a safe range.      Diagnosis: Abdominal pain  Assessment and Plan of Treatment: PRINCIPAL DISCHARGE DIAGNOSIS  Diagnosis: Hypoglycemia  Assessment and Plan of Treatment: pt incorectly dosed her insulin  seen by house endocrine team and recommendations given  to follow up with Dr. Ley within 1 week of discharge  VNS for review of medication regimen      SECONDARY DISCHARGE DIAGNOSES  Diagnosis: Chronic CHF  Assessment and Plan of Treatment: Weigh yourself daily.  If you gain 3lbs in 3 days, or 5lbs in a week call your Health Care Provider.  Do not eat or drink foods containing more than 2000mg of salt (sodium) in your diet every day.  Call your Health Care Provider if you have any swelling or increased swelling in your feet, ankles, and/or stomach.  Take all of your medication as directed.  If you become dizzy call your Health Care Provider.      Diagnosis: ESRD on dialysis  Assessment and Plan of Treatment: follow up with Dr. Burger  continue current HD regimen    Diagnosis: Type 1 diabetes mellitus with hypoglycemia and without coma  Assessment and Plan of Treatment: Type 1 diabetes mellitus with hypoglycemia and without coma  HgA1C this admission.  Make sure you get your HgA1c checked every three months.  If you take oral diabetes medications, check your blood glucose two times a day.  If you take insulin, check your blood glucose before meals and at bedtime.  It's important not to skip any meals.  Keep a log of your blood glucose results and always take it with you to your doctor appointments.  Keep a list of your current medications including injectables and over the counter medications and bring this medication list with you to all your doctor appointments.  If you have not seen your ophthalmologist this year call for appointment.  Check your feet daily for redness, sores, or openings. Do not self treat. If no improvement in two days call your primary care physician for an appointment.  Low blood sugar (hypoglycemia) is a blood sugar below 70mg/dl. Check your blood sugar if you feel signs/symptoms of hypoglycemia. If your blood sugar is below 70 take 15 grams of carbohydrates (ex 4 oz of apple juice, 3-4 glucose tablets, or 4-6 oz of regular soda) wait 15 minutes and repeat blood sugar to make sure it comes up above 70.  If your blood sugar is above 70 and you are due for a meal, have a meal.  If you are not due for a meal have a snack.  This snack helps keeps your blood sugar at a safe range.      Diagnosis: Abdominal pain  Assessment and Plan of Treatment: resolved

## 2019-06-04 VITALS
SYSTOLIC BLOOD PRESSURE: 156 MMHG | DIASTOLIC BLOOD PRESSURE: 80 MMHG | HEART RATE: 82 BPM | RESPIRATION RATE: 18 BRPM | OXYGEN SATURATION: 97 % | TEMPERATURE: 99 F

## 2019-06-04 LAB
GLUCOSE BLDC GLUCOMTR-MCNC: 271 MG/DL — HIGH (ref 70–99)
GLUCOSE BLDC GLUCOMTR-MCNC: 402 MG/DL — HIGH (ref 70–99)
GLUCOSE BLDC GLUCOMTR-MCNC: 445 MG/DL — HIGH (ref 70–99)
GLUCOSE BLDC GLUCOMTR-MCNC: 94 MG/DL — SIGNIFICANT CHANGE UP (ref 70–99)

## 2019-06-04 PROCEDURE — 51701 INSERT BLADDER CATHETER: CPT

## 2019-06-04 PROCEDURE — 86706 HEP B SURFACE ANTIBODY: CPT

## 2019-06-04 PROCEDURE — 99232 SBSQ HOSP IP/OBS MODERATE 35: CPT

## 2019-06-04 PROCEDURE — 87340 HEPATITIS B SURFACE AG IA: CPT

## 2019-06-04 PROCEDURE — 82803 BLOOD GASES ANY COMBINATION: CPT

## 2019-06-04 PROCEDURE — 99261: CPT

## 2019-06-04 PROCEDURE — 80307 DRUG TEST PRSMV CHEM ANLYZR: CPT

## 2019-06-04 PROCEDURE — 96374 THER/PROPH/DIAG INJ IV PUSH: CPT | Mod: XU

## 2019-06-04 PROCEDURE — 83605 ASSAY OF LACTIC ACID: CPT

## 2019-06-04 PROCEDURE — 74177 CT ABD & PELVIS W/CONTRAST: CPT

## 2019-06-04 PROCEDURE — 85027 COMPLETE CBC AUTOMATED: CPT

## 2019-06-04 PROCEDURE — 82435 ASSAY OF BLOOD CHLORIDE: CPT

## 2019-06-04 PROCEDURE — 82947 ASSAY GLUCOSE BLOOD QUANT: CPT

## 2019-06-04 PROCEDURE — 80053 COMPREHEN METABOLIC PANEL: CPT

## 2019-06-04 PROCEDURE — 94640 AIRWAY INHALATION TREATMENT: CPT

## 2019-06-04 PROCEDURE — 82962 GLUCOSE BLOOD TEST: CPT

## 2019-06-04 PROCEDURE — 84132 ASSAY OF SERUM POTASSIUM: CPT

## 2019-06-04 PROCEDURE — 84295 ASSAY OF SERUM SODIUM: CPT

## 2019-06-04 PROCEDURE — 84100 ASSAY OF PHOSPHORUS: CPT

## 2019-06-04 PROCEDURE — 93005 ELECTROCARDIOGRAM TRACING: CPT | Mod: XU

## 2019-06-04 PROCEDURE — 82330 ASSAY OF CALCIUM: CPT

## 2019-06-04 PROCEDURE — 83735 ASSAY OF MAGNESIUM: CPT

## 2019-06-04 PROCEDURE — 86803 HEPATITIS C AB TEST: CPT

## 2019-06-04 PROCEDURE — 71045 X-RAY EXAM CHEST 1 VIEW: CPT

## 2019-06-04 PROCEDURE — 99285 EMERGENCY DEPT VISIT HI MDM: CPT | Mod: 25

## 2019-06-04 PROCEDURE — 85014 HEMATOCRIT: CPT

## 2019-06-04 RX ADMIN — Medication 2: at 12:40

## 2019-06-04 RX ADMIN — OXYCODONE AND ACETAMINOPHEN 1 TABLET(S): 5; 325 TABLET ORAL at 00:20

## 2019-06-04 RX ADMIN — Medication 3 MILLILITER(S): at 05:06

## 2019-06-04 RX ADMIN — LISINOPRIL 40 MILLIGRAM(S): 2.5 TABLET ORAL at 05:07

## 2019-06-04 RX ADMIN — Medication 2 UNIT(S): at 08:49

## 2019-06-04 RX ADMIN — Medication 50 MILLIGRAM(S): at 05:07

## 2019-06-04 RX ADMIN — Medication 2 UNIT(S): at 12:40

## 2019-06-04 RX ADMIN — SEVELAMER CARBONATE 1600 MILLIGRAM(S): 2400 POWDER, FOR SUSPENSION ORAL at 09:09

## 2019-06-04 RX ADMIN — OXYCODONE AND ACETAMINOPHEN 1 TABLET(S): 5; 325 TABLET ORAL at 05:50

## 2019-06-04 RX ADMIN — Medication 4: at 08:49

## 2019-06-04 RX ADMIN — Medication 25 MILLIGRAM(S): at 05:08

## 2019-06-04 RX ADMIN — OXYCODONE AND ACETAMINOPHEN 1 TABLET(S): 5; 325 TABLET ORAL at 05:11

## 2019-06-04 RX ADMIN — PANTOPRAZOLE SODIUM 40 MILLIGRAM(S): 20 TABLET, DELAYED RELEASE ORAL at 05:08

## 2019-06-04 NOTE — PROGRESS NOTE ADULT - SUBJECTIVE AND OBJECTIVE BOX
Diabetes Follow up note: Saw pt earlier today  Interval Hx: 62 yo female with uncontrolled type 1 diabetes (well known from multiple prior admissions) DM c/b ESRD on HD, neuropathy, CAD, CVA, CHF and multiple cardiac stents presents w/hypoglycemia. Here with insulin overdose now resolve awaiting discharge. Pt and family refused home care visit to assits with care and reported to primary team that they will supervise pt at home with insulin administration. Noted fasting BG >400s>  pt reports eating Bet[employer] cookies overnight for a total of 6 cookies. Per primary team RD saw pt and reinforce importance of diet adherence.  No further hypoglycemia.      Review of Systems:  General: as above. No complaints   GI: Tolerating POs without any N/V/D/ABD PAIN.  CV: No CP/SOB  ENDO: No S&Sx of hypoglycemia    MEDS:  insulin glargine Injectable (LANTUS) 4 Unit(s) SubCutaneous at bedtime  insulin lispro (HumaLOG) corrective regimen sliding scale   SubCutaneous three times a day before meals  insulin lispro (HumaLOG) corrective regimen sliding scale   SubCutaneous at bedtime  insulin lispro Injectable (HumaLOG) 2 Unit(s) SubCutaneous before breakfast  insulin lispro Injectable (HumaLOG) 2 Unit(s) SubCutaneous before lunch  insulin lispro Injectable (HumaLOG) 2 Unit(s) SubCutaneous before dinner    Allergies    No Known Allergies      PE:  General: Female laying in bed in NAD.   Vital Signs Last 24 Hrs  T(C): 36.7 (06-04-19 @ 13:00), Max: 37.2 (06-03-19 @ 21:36)  T(F): 98 (06-04-19 @ 13:00), Max: 98.9 (06-03-19 @ 21:36)  HR: 72 (06-04-19 @ 13:00) (72 - 82)  BP: 120/- (06-04-19 @ 13:00) (120/- - 177/71)  BP(mean): --  RR: 18 (06-04-19 @ 13:00) (17 - 18)  SpO2: 97% (06-04-19 @ 13:00) (96% - 100%)  Abd: Soft, NT, ND,   Extremities: Warm. L AV fistula intact. Trace edema in LEs. L>R   Neuro: A&O X3    LABS:  POCT Blood Glucose.: 271 mg/dL (06-04-19 @ 12:05)  POCT Blood Glucose.: 402 mg/dL (06-04-19 @ 07:54)  POCT Blood Glucose.: 445 mg/dL (06-04-19 @ 07:53)  POCT Blood Glucose.: 147 mg/dL (06-03-19 @ 21:33)  POCT Blood Glucose.: 102 mg/dL (06-03-19 @ 17:51)  POCT Blood Glucose.: 95 mg/dL (06-03-19 @ 11:59)  POCT Blood Glucose.: 257 mg/dL (06-03-19 @ 07:58)  POCT Blood Glucose.: 275 mg/dL (06-02-19 @ 22:09)  POCT Blood Glucose.: 333 mg/dL (06-02-19 @ 18:10)  POCT Blood Glucose.: 275 mg/dL (06-02-19 @ 17:06)                                   9.4    3.98  )-----------( 190      ( 03 Jun 2019 08:23 )             29.8     06-03    133<L>  |  92<L>  |  28<H>  ----------------------------<  257<H>  5.9<H>   |  25  |  4.72<H>    Ca    9.9      03 Jun 2019 06:47    TPro  6.1  /  Alb  3.8  /  TBili  0.2  /  DBili  x   /  AST  23  /  ALT  18  /  AlkPhos  67  06-03    Hemoglobin A1C, Whole Blood: 8.8 % <H> [4.0 - 5.6] (04-30-19 @ 08:58)

## 2019-06-04 NOTE — PROGRESS NOTE ADULT - SUBJECTIVE AND OBJECTIVE BOX
Patient is a 63y old  Female who presents with a chief complaint of weakness (04 Jun 2019 07:36)      SUBJECTIVE / OVERNIGHT EVENTS: Comfortable without new complaints.  at bedside.  Review of Systems  chest pain no  palpitations no  sob no  nausea no  headache no    MEDICATIONS  (STANDING):  ALBUTerol/ipratropium for Nebulization 3 milliLiter(s) Nebulizer every 6 hours  aspirin enteric coated 81 milliGRAM(s) Oral daily  clopidogrel Tablet 75 milliGRAM(s) Oral daily  dextrose 5%. 1000 milliLiter(s) (50 mL/Hr) IV Continuous <Continuous>  dextrose 50% Injectable 12.5 Gram(s) IV Push once  dextrose 50% Injectable 25 Gram(s) IV Push once  dextrose 50% Injectable 25 Gram(s) IV Push once  DULoxetine 60 milliGRAM(s) Oral daily  furosemide    Tablet 20 milliGRAM(s) Oral <User Schedule>  heparin  Injectable 5000 Unit(s) SubCutaneous every 12 hours  hydrALAZINE 25 milliGRAM(s) Oral three times a day  insulin glargine Injectable (LANTUS) 4 Unit(s) SubCutaneous at bedtime  insulin lispro (HumaLOG) corrective regimen sliding scale   SubCutaneous at bedtime  insulin lispro (HumaLOG) corrective regimen sliding scale   SubCutaneous three times a day before meals  insulin lispro Injectable (HumaLOG) 2 Unit(s) SubCutaneous before breakfast  insulin lispro Injectable (HumaLOG) 2 Unit(s) SubCutaneous before lunch  insulin lispro Injectable (HumaLOG) 2 Unit(s) SubCutaneous before dinner  lisinopril 40 milliGRAM(s) Oral daily  metoprolol succinate ER 50 milliGRAM(s) Oral daily  multivitamin 1 Tablet(s) Oral daily  pantoprazole    Tablet 40 milliGRAM(s) Oral before breakfast  sevelamer carbonate 1600 milliGRAM(s) Oral three times a day with meals    MEDICATIONS  (PRN):  acetaminophen   Tablet .. 650 milliGRAM(s) Oral every 6 hours PRN Temp greater or equal to 38.5C (101.3F), Mild Pain (1 - 3)  dextrose 40% Gel 15 Gram(s) Oral once PRN Blood Glucose LESS THAN 70 milliGRAM(s)/deciliter  glucagon  Injectable 1 milliGRAM(s) IntraMuscular once PRN Glucose LESS THAN 70 milligrams/deciliter  oxyCODONE    5 mG/acetaminophen 325 mG 1 Tablet(s) Oral every 6 hours PRN Moderate Pain (4 - 6)      Vital Signs Last 24 Hrs  T(C): 36.8 (04 Jun 2019 04:13), Max: 37.2 (03 Jun 2019 21:36)  T(F): 98.3 (04 Jun 2019 04:13), Max: 98.9 (03 Jun 2019 21:36)  HR: 77 (04 Jun 2019 04:13) (72 - 82)  BP: 153/80 (04 Jun 2019 04:13) (133/76 - 177/71)  BP(mean): --  RR: 18 (04 Jun 2019 04:13) (17 - 18)  SpO2: 96% (04 Jun 2019 04:13) (96% - 100%)    PHYSICAL EXAM:  GENERAL: NAD, well-developed  HEAD:  Atraumatic, Normocephalic  EYES: EOMI, PERRLA, conjunctiva and sclera clear  NECK: Supple, No JVD  CHEST/LUNG: Clear to auscultation bilaterally; No wheeze  HEART: Regular rate and rhythm; No murmurs, rubs, or gallops  ABDOMEN: Soft, Nontender, Nondistended; Bowel sounds present  EXTREMITIES:  2+ Peripheral Pulses, No clubbing, cyanosis, or edema  PSYCH: AAOx3  NEUROLOGY: non-focal  SKIN: No rashes or lesions    LABS:                        9.4    3.98  )-----------( 190      ( 03 Jun 2019 08:23 )             29.8     06-03    133<L>  |  92<L>  |  28<H>  ----------------------------<  257<H>  5.9<H>   |  25  |  4.72<H>    Ca    9.9      03 Jun 2019 06:47    TPro  6.1  /  Alb  3.8  /  TBili  0.2  /  DBili  x   /  AST  23  /  ALT  18  /  AlkPhos  67  06-03                RADIOLOGY & ADDITIONAL TESTS:    Imaging Personally Reviewed:    Consultant(s) Notes Reviewed:      Care Discussed with Consultants/Other Providers:

## 2019-06-04 NOTE — CHART NOTE - NSCHARTNOTEFT_GEN_A_CORE
NP note - Endocrine service recommended that patient be discharged with home care for diabetes education.  CM setting up home services, however, patient's  stated that he would not allow home care nurse to enter the house.  Dr. Viera made aware and patient will be discharged with no home care services.  Dietitian provided some patient education while she was still admitted.  CRISTOBAL cunha NP [id number]

## 2019-06-04 NOTE — PROGRESS NOTE ADULT - SUBJECTIVE AND OBJECTIVE BOX
Cardiovascular Disease Progress Note    Overnight events: No acute events overnight.  no cp/sob/palps/dizziness. awaiting vns services to be reinstated.  Otherwise review of systems negative    Objective Findings:  T(C): 36.8 (19 @ 04:13), Max: 37.2 (19 @ 21:36)  HR: 77 (19 @ 04:13) (72 - 82)  BP: 153/80 (19 @ 04:13) (133/76 - 177/71)  RR: 18 (19 @ 04:13) (17 - 18)  SpO2: 96% (19 @ 04:13) (96% - 100%)  Wt(kg): --  Daily     Daily Weight in k (2019 05:53)      Physical Exam:  Gen: NAD  HEENT: EOMI  CV: RRR, normal S1 + S2, no m/r/g  Lungs: CTAB  Abd: soft, non-tender  Ext: trace edema        Laboratory Data:                        9.4    3.98  )-----------( 190      ( 2019 08:23 )             29.8     06-    133<L>  |  92<L>  |  28<H>  ----------------------------<  257<H>  5.9<H>   |  25  |  4.72<H>    Ca    9.9      2019 06:47    TPro  6.1  /  Alb  3.8  /  TBili  0.2  /  DBili  x   /  AST  23  /  ALT  18  /  AlkPhos  67  06-03              Inpatient Medications:  MEDICATIONS  (STANDING):  ALBUTerol/ipratropium for Nebulization 3 milliLiter(s) Nebulizer every 6 hours  aspirin enteric coated 81 milliGRAM(s) Oral daily  clopidogrel Tablet 75 milliGRAM(s) Oral daily  dextrose 5%. 1000 milliLiter(s) (50 mL/Hr) IV Continuous <Continuous>  dextrose 50% Injectable 12.5 Gram(s) IV Push once  dextrose 50% Injectable 25 Gram(s) IV Push once  dextrose 50% Injectable 25 Gram(s) IV Push once  DULoxetine 60 milliGRAM(s) Oral daily  furosemide    Tablet 20 milliGRAM(s) Oral <User Schedule>  heparin  Injectable 5000 Unit(s) SubCutaneous every 12 hours  hydrALAZINE 25 milliGRAM(s) Oral three times a day  insulin glargine Injectable (LANTUS) 4 Unit(s) SubCutaneous at bedtime  insulin lispro (HumaLOG) corrective regimen sliding scale   SubCutaneous at bedtime  insulin lispro (HumaLOG) corrective regimen sliding scale   SubCutaneous three times a day before meals  insulin lispro Injectable (HumaLOG) 2 Unit(s) SubCutaneous before breakfast  insulin lispro Injectable (HumaLOG) 2 Unit(s) SubCutaneous before lunch  insulin lispro Injectable (HumaLOG) 2 Unit(s) SubCutaneous before dinner  lisinopril 40 milliGRAM(s) Oral daily  metoprolol succinate ER 50 milliGRAM(s) Oral daily  multivitamin 1 Tablet(s) Oral daily  pantoprazole    Tablet 40 milliGRAM(s) Oral before breakfast  sevelamer carbonate 1600 milliGRAM(s) Oral three times a day with meals      Assessment:  -leg pain  -hyperkalemia  -recurrent DKA  -volume overload  -ischemic cardiomyopathy, cad s/p multiple stents  -esrd on hd  -PAD s/p prior intervention   -malfunctioning AV fistula      Recs:  -leg pain/swelling, hx of PAD. sx not consistent with CLI. most likely neuropathic - consider trial of gabapentin. no obvious infection. possibly related to edema 2/2 volume overload. c/w HD. further management of PAD as outpatient. can obtain venous duplex to r/o DVT although recent venous duplex neg  -awaiting avf repair  -volume overload on admission --> c/w HD to maintain euvolemia  -hypo/hyperglycemia --> f/u endo. improved  -ischemic cardiomyopathy s/p C with Dr Vela  --> severe and diffuse instent restenosis along RCA --> unable to stent due to multiple layers of stenting and prior brachytherapy. we can consider complex intervention if true ischemic sx develop. c/w medical treatment with anti-platelet, statins and anti-anginals for now.  c/w metop succinate 50mg daily for anti-anginal effect and for pAT/NSVT  -c/w beta blockers for nsvt/pat/cad (as above)  -hx of prolonged qtc. avoid qtc prolonging meds  -s/p recent TTE: mod-severe MR, pseudo AS (low flow-low gradient), mod-severe LV dysfunction --> recent CTS consult with dr hebert appreciated. plan is for medical management given surgical risk and patient preference  -c/w asa, plavix, statin for ischemic cardiomyopathy/CAD/PAD  -dvt ppx      Over 25 minutes spent on total encounter; more than 50% of the visit was spent counseling and/or coordinating care by the attending physician.      Julián Biswas MD   Cardiovascular Disease  [phone]

## 2019-06-04 NOTE — PROGRESS NOTE ADULT - SUBJECTIVE AND OBJECTIVE BOX
[facility]   [phone]  DIALYSIS NOTE  Chief Complaint: ESRD/Ongoing hemodialysis requirement.    24 hour events/subjective:    no worsening sob         ALLERGIES & MEDICATIONS  --------------------------------------------------------------------------------  Allergies    No Known Allergies    Intolerances      Standing Inpatient Medications  ALBUTerol/ipratropium for Nebulization 3 milliLiter(s) Nebulizer every 6 hours  aspirin enteric coated 81 milliGRAM(s) Oral daily  clopidogrel Tablet 75 milliGRAM(s) Oral daily  dextrose 5%. 1000 milliLiter(s) IV Continuous <Continuous>  dextrose 50% Injectable 12.5 Gram(s) IV Push once  dextrose 50% Injectable 25 Gram(s) IV Push once  dextrose 50% Injectable 25 Gram(s) IV Push once  DULoxetine 60 milliGRAM(s) Oral daily  furosemide    Tablet 20 milliGRAM(s) Oral <User Schedule>  heparin  Injectable 5000 Unit(s) SubCutaneous every 12 hours  hydrALAZINE 25 milliGRAM(s) Oral three times a day  insulin glargine Injectable (LANTUS) 4 Unit(s) SubCutaneous at bedtime  insulin lispro (HumaLOG) corrective regimen sliding scale   SubCutaneous at bedtime  insulin lispro (HumaLOG) corrective regimen sliding scale   SubCutaneous three times a day before meals  insulin lispro Injectable (HumaLOG) 2 Unit(s) SubCutaneous before breakfast  insulin lispro Injectable (HumaLOG) 2 Unit(s) SubCutaneous before lunch  insulin lispro Injectable (HumaLOG) 2 Unit(s) SubCutaneous before dinner  lisinopril 40 milliGRAM(s) Oral daily  metoprolol succinate ER 50 milliGRAM(s) Oral daily  multivitamin 1 Tablet(s) Oral daily  pantoprazole    Tablet 40 milliGRAM(s) Oral before breakfast  sevelamer carbonate 1600 milliGRAM(s) Oral three times a day with meals    PRN Inpatient Medications  acetaminophen   Tablet .. 650 milliGRAM(s) Oral every 6 hours PRN  dextrose 40% Gel 15 Gram(s) Oral once PRN  glucagon  Injectable 1 milliGRAM(s) IntraMuscular once PRN  oxyCODONE    5 mG/acetaminophen 325 mG 1 Tablet(s) Oral every 6 hours PRN      REVIEW OF SYSTEMS  --------------------------------------------------------------------------------  no itching or rash  no fever or chill  no cp or palp   no sob or cough   no N/V/D/ no abd pain   ext no edema no pain         VITALS/PHYSICAL EXAM  --------------------------------------------------------------------------------  T(C): 36.6 (06-04-19 @ 12:47), Max: 37.2 (06-03-19 @ 21:36)  HR: 73 (06-04-19 @ 12:47) (72 - 82)  BP: 154/76 (06-04-19 @ 12:47) (133/76 - 177/71)  RR: 18 (06-04-19 @ 12:47) (17 - 18)  SpO2: 96% (06-04-19 @ 12:47) (96% - 100%)  Wt(kg): --        06-03-19 @ 07:01  -  06-04-19 @ 07:00  --------------------------------------------------------  IN: 640 mL / OUT: 2500 mL / NET: -1860 mL    06-04-19 @ 07:01  -  06-04-19 @ 13:22  --------------------------------------------------------  IN: 420 mL / OUT: 0 mL / NET: 420 mL    	  Physical Exam:  		  Gen frail appearing   	no jvd ,  	Pulm: decrease bs  no rales or ronchi or wheezing  	CV: RRR, S1S2; no rub  	Back: No CVA tenderness; no sacral edema  	Abd: +BS, soft, nontender/nondistended  	: No suprapubic tenderness  	UE: Warm, no cyanosis  no clubbing,  no edema; no asterixis  	LE: Warm, no cyanosis  no clubbing, 2+ pitting  edema  	Neuro: alert and oriented. speech coherent   		Vascular access: + avf + bruit and thrill       LABS/STUDIES  --------------------------------------------------------------------------------              9.4    3.98  >-----------<  190      [06-03-19 @ 08:23]              29.8     133  |  92  |  28  ----------------------------<  257      [06-03-19 @ 06:47]  5.9   |  25  |  4.72        Ca     9.9     [06-03-19 @ 06:47]    TPro  6.1  /  Alb  3.8  /  TBili  0.2  /  DBili  x   /  AST  23  /  ALT  18  /  AlkPhos  67  [06-03-19 @ 06:47]                  imp/suggest: ESRD      Hemodialysis Prescription:  	Access:  	Dialyzer: revaclear   	Blood Flow (mL/Min): 400  	Dialysate Flow (mL/Min): 600  	Target UF (Liters):  	Treatment Time:  	Potassium:   	Calcium: 2.5  	  YOLANDA    Vitamin D     continue with hd   see hd flow sheet

## 2019-06-04 NOTE — PROGRESS NOTE ADULT - PROBLEM SELECTOR PLAN 1
-test BG AC/HS  -c/w Lantus 4 units QHS  -c/w Humalog 2 units AC meals  -C/w adjusted Humalog correction scale AC meals and Low HS correction scale  -Discharge on present Lantus and Humalog doses as noted above. Discontinue Humalog correction scales upon discharge.   -Outpt. endo follow-up w/ Dr. Ley  -Plan discussed with pt/team. At home pt and family adjust insulin doses according to PO intake and BG levels.   Contact info: [phone] (24/7). pager [phone]

## 2019-06-04 NOTE — CHART NOTE - NSCHARTNOTEFT_GEN_A_CORE
Nutrition note     Nutrition consult received for education before discharge.     Charts, medication, and labs reviewed (04/30) HbA1c 8.8%. Pt reports good appetite and PO intake, states "I eat too much", denies GI distress at this time, last BM today (06/04). Denies weight changes PTA. Provided education on T1DM, CHF, and renal on HD nutrition therapy. Provided education on foods containing carbohydrates, foods containing proteins, and portion sizes. Stressed the importance of a balanced meal to maintain blood glucose. Encouraged vegetables consumption. Described HbA1c and stressed importance of its normal levels. Encouraged Pt to continue monitoring blood glucose at home and to adjust insulin provision according to BG and carbohydrates to be consumed. Reviewed carbohydrate counting. Discussed how to manage hypoglycemia. Recommended water consumption with avoidance of soda and juice. Recommended limited salt intake. Reviewed foods high in salt and amount of salt recommended per day. Discussed nutrition label reading. Stressed the importance of limited fried foods and saturated fat consumption. Discussed the importance of daily weights at home - pt reports they obtained weight at dialysis center. Reviewed increased demand for kcal and protein intake to help prevent muscle/weight loss due to HD, reviewed foods high in potassium, phosphorus, and sodium, discussed foods recommended within therapeutic diet and protein portion sizing. Pt reports following up with dietitian at dialysis center. Offered handouts with education provided - pt refused at this time, states having handouts at home.     RD remains available.   Blanca Meeks MS, RDN, #[phone] Nutrition note     Nutrition consult received for education on CHF, T1DM, and ESRD on HD nutrition therapy before discharge.     Charts, medication, and labs reviewed (04/30) HbA1c 8.8%. Pt reports good appetite and PO intake, states "I eat too much", denies GI distress at this time, last BM today (06/04). Denies weight changes PTA. Provided education on T1DM, CHF, and renal on HD nutrition therapy. Provided education on foods containing carbohydrates, foods containing proteins, and portion sizes. Stressed the importance of a balanced meal to maintain blood glucose. Encouraged vegetables consumption. Described HbA1c and stressed importance of its normal levels. Encouraged Pt to continue monitoring blood glucose at home and to adjust insulin provision according to BG and carbohydrates to be consumed. Reviewed carbohydrate counting. Discussed how to manage hypoglycemia. Recommended water consumption with avoidance of soda and juice. Recommended limited salt intake. Reviewed foods high in salt and amount of salt recommended per day. Discussed nutrition label reading. Stressed the importance of limited fried foods and saturated fat consumption. Discussed the importance of daily weights at home - pt reports they obtained weight at dialysis center. Reviewed increased demand for kcal and protein intake to help prevent muscle/weight loss due to HD, reviewed foods high in potassium, phosphorus, and sodium, discussed foods recommended within therapeutic diet and protein portion sizing. Pt reports following up with dietitian at dialysis center. Offered handouts with education provided - pt refused at this time, states having handouts at home.     RD remains available.   Blanca Meeks MS, RDN, #[phone] Nutrition note     Nutrition consult received for education on CHF, T1DM, and ESRD on HD nutrition therapy before discharge.     Charts, medication, and labs reviewed (04/30) HbA1c 8.8%; obtained subjective information PTA. Pt reports good appetite and PO intake, states "I eat too much", denies GI distress at this time, last BM today (06/04). Denies weight changes PTA. Provided education on T1DM, CHF, and renal on HD nutrition therapy. Provided education on foods containing carbohydrates, foods containing proteins, and portion sizes. Stressed the importance of a balanced meal to maintain blood glucose. Encouraged vegetables consumption. Described HbA1c and stressed importance of its normal levels. Encouraged Pt to continue monitoring blood glucose at home and to adjust insulin provision according to BG and carbohydrates to be consumed. Reviewed carbohydrate counting. Discussed how to manage hypoglycemia. Recommended water consumption with avoidance of soda and juice. Recommended limited salt intake. Reviewed foods high in salt and amount of salt recommended per day. Discussed nutrition label reading. Stressed the importance of limited fried foods and saturated fat consumption. Discussed the importance of daily weights at home - pt reports they obtained weight at dialysis center. Reviewed increased demand for kcal and protein intake to help prevent muscle/weight loss due to HD, reviewed foods high in potassium, phosphorus, and sodium, discussed foods recommended within therapeutic diet and protein portion sizing. Pt reports following up with dietitian at dialysis center. Offered handouts with education provided - pt refused at this time, states having handouts at home.     RD remains available.   Blanca Meeks MS, RDN, #[phone]

## 2019-06-04 NOTE — PROGRESS NOTE ADULT - ASSESSMENT
64yo F h/o DM1 w frequent hospitalizations for DKA, ESRD on HD (ANA AVF, M/T/Th/Sa, last HD currently), CAD s/p  stents, HTN HLD, CVA, PVD presents with hypoglycemia as a result of accidental overdose of basal insulin. Tolerating POs w/improvement in glucose values and rebound hyperglycemia today due to pt's nutritional indiscretions. Noted that pt and family are refusing home care at this time, Spoke to pt's  [phone] the need to supervise pt with glucose monitoring and insulin administration. Per , he and his daughter always assist pt with DM care but this time they didn't know she was doing the insulin without them been present but assures this provider that they will supervise pt so this doesn't happen again. Still refusing home care at his time. Pt cleared to go home and follow up with pvt endo Dr Ley.

## 2019-06-04 NOTE — PROGRESS NOTE ADULT - ASSESSMENT
63 f with  IDDM type 1 and hypoglycemia   - BS control  - endocrine folllow.    ESRD  - HD  - nephrology follow Dr. Schmidt    CAD (coronary artery disease) s/p stents  - stable  - continue Rx    CHF (congestive heart failure)   - cardiology evaluation dr. Biswas    COPD (chronic obstructive pulmonary disease)   - nebs prn    HTN control    CVA   - supportive care     Depression/ Anxiety  - continue Rx    Edema/ leg pain  - LED r/o DVT    DVT/GI prphylaxis    DC home after HD. Patient and  refuse home care.  Follow with PMD/ Nephrology/ Endocrinology in 2-3 days. QA  Pierce Viera MD pager [phone]

## 2019-06-04 NOTE — PROGRESS NOTE ADULT - ASSESSMENT
64 y/o Female PMH CAD Sp PTCA w stents CHF,COPD, CVA, DMT1, ESRD on HD (M,Tu,Th,Sa), Gout, HLD, PVD, Sublcavian Stensosis (L) sp stent. PSH ASD Repair,idania,fem-pop bypass.  now presents with hypoglycemia    1- esrd  2- chf  3- hypoglycemia on admission in dialysis pt   4-  htn   5- shpt     hd 3.5 hr 2 k 2 liter bfr 400 dfr 600 revaclear 300

## 2019-06-10 ENCOUNTER — INPATIENT (INPATIENT)
Facility: HOSPITAL | Age: 64
LOS: 1 days | Discharge: HOME | DRG: 637 | End: 2019-06-12
Attending: INTERNAL MEDICINE | Admitting: INTERNAL MEDICINE
Payer: MEDICARE

## 2019-06-10 VITALS
RESPIRATION RATE: 18 BRPM | DIASTOLIC BLOOD PRESSURE: 74 MMHG | TEMPERATURE: 99 F | SYSTOLIC BLOOD PRESSURE: 153 MMHG | WEIGHT: 100.09 LBS | OXYGEN SATURATION: 100 % | HEART RATE: 72 BPM

## 2019-06-10 DIAGNOSIS — Z98.89 OTHER SPECIFIED POSTPROCEDURAL STATES: Chronic | ICD-10-CM

## 2019-06-10 LAB
ALBUMIN SERPL ELPH-MCNC: 3.4 G/DL — SIGNIFICANT CHANGE UP (ref 3.3–5)
ALP SERPL-CCNC: 65 U/L — SIGNIFICANT CHANGE UP (ref 40–120)
ALT FLD-CCNC: 14 U/L — SIGNIFICANT CHANGE UP (ref 10–45)
ANION GAP SERPL CALC-SCNC: 18 MMOL/L — HIGH (ref 5–17)
APTT BLD: 28.5 SEC — SIGNIFICANT CHANGE UP (ref 27.5–36.3)
AST SERPL-CCNC: 15 U/L — SIGNIFICANT CHANGE UP (ref 10–40)
B-OH-BUTYR SERPL-SCNC: 0.9 MMOL/L — HIGH
BASOPHILS # BLD AUTO: 0 K/UL — SIGNIFICANT CHANGE UP (ref 0–0.2)
BASOPHILS NFR BLD AUTO: 0.2 % — SIGNIFICANT CHANGE UP (ref 0–2)
BILIRUB SERPL-MCNC: 0.2 MG/DL — SIGNIFICANT CHANGE UP (ref 0.2–1.2)
BUN SERPL-MCNC: 64 MG/DL — HIGH (ref 7–23)
CALCIUM SERPL-MCNC: 8.9 MG/DL — SIGNIFICANT CHANGE UP (ref 8.4–10.5)
CHLORIDE SERPL-SCNC: 97 MMOL/L — SIGNIFICANT CHANGE UP (ref 96–108)
CO2 SERPL-SCNC: 22 MMOL/L — SIGNIFICANT CHANGE UP (ref 22–31)
CREAT SERPL-MCNC: 5.65 MG/DL — HIGH (ref 0.5–1.3)
EOSINOPHIL # BLD AUTO: 0.1 K/UL — SIGNIFICANT CHANGE UP (ref 0–0.5)
EOSINOPHIL NFR BLD AUTO: 2.4 % — SIGNIFICANT CHANGE UP (ref 0–6)
ETHANOL SERPL-MCNC: SIGNIFICANT CHANGE UP MG/DL (ref 0–10)
GAS PNL BLDV: SIGNIFICANT CHANGE UP
GLUCOSE SERPL-MCNC: 434 MG/DL — HIGH (ref 70–99)
HCT VFR BLD CALC: 28 % — LOW (ref 34.5–45)
HGB BLD-MCNC: 9.5 G/DL — LOW (ref 11.5–15.5)
INR BLD: 0.98 RATIO — SIGNIFICANT CHANGE UP (ref 0.88–1.16)
LYMPHOCYTES # BLD AUTO: 0.5 K/UL — LOW (ref 1–3.3)
LYMPHOCYTES # BLD AUTO: 13 % — SIGNIFICANT CHANGE UP (ref 13–44)
MCHC RBC-ENTMCNC: 34.1 GM/DL — SIGNIFICANT CHANGE UP (ref 32–36)
MCHC RBC-ENTMCNC: 36 PG — HIGH (ref 27–34)
MCV RBC AUTO: 106 FL — HIGH (ref 80–100)
MONOCYTES # BLD AUTO: 0.5 K/UL — SIGNIFICANT CHANGE UP (ref 0–0.9)
MONOCYTES NFR BLD AUTO: 13.9 % — SIGNIFICANT CHANGE UP (ref 2–14)
NEUTROPHILS # BLD AUTO: 2.7 K/UL — SIGNIFICANT CHANGE UP (ref 1.8–7.4)
NEUTROPHILS NFR BLD AUTO: 70.5 % — SIGNIFICANT CHANGE UP (ref 43–77)
PLATELET # BLD AUTO: 182 K/UL — SIGNIFICANT CHANGE UP (ref 150–400)
POTASSIUM SERPL-MCNC: 7.1 MMOL/L — CRITICAL HIGH (ref 3.5–5.3)
POTASSIUM SERPL-SCNC: 7.1 MMOL/L — CRITICAL HIGH (ref 3.5–5.3)
PROT SERPL-MCNC: 6 G/DL — SIGNIFICANT CHANGE UP (ref 6–8.3)
PROTHROM AB SERPL-ACNC: 11.3 SEC — SIGNIFICANT CHANGE UP (ref 10–12.9)
RBC # BLD: 2.65 M/UL — LOW (ref 3.8–5.2)
RBC # FLD: 13.1 % — SIGNIFICANT CHANGE UP (ref 10.3–14.5)
SODIUM SERPL-SCNC: 137 MMOL/L — SIGNIFICANT CHANGE UP (ref 135–145)
TROPONIN T, HIGH SENSITIVITY RESULT: 170 NG/L — HIGH (ref 0–51)
WBC # BLD: 3.8 K/UL — SIGNIFICANT CHANGE UP (ref 3.8–10.5)
WBC # FLD AUTO: 3.8 K/UL — SIGNIFICANT CHANGE UP (ref 3.8–10.5)

## 2019-06-10 PROCEDURE — 93010 ELECTROCARDIOGRAM REPORT: CPT

## 2019-06-10 PROCEDURE — 70450 CT HEAD/BRAIN W/O DYE: CPT | Mod: 26

## 2019-06-10 PROCEDURE — 99291 CRITICAL CARE FIRST HOUR: CPT | Mod: GC

## 2019-06-10 PROCEDURE — 71045 X-RAY EXAM CHEST 1 VIEW: CPT | Mod: 26

## 2019-06-10 RX ORDER — INSULIN HUMAN 100 [IU]/ML
4 INJECTION, SOLUTION SUBCUTANEOUS ONCE
Refills: 0 | Status: COMPLETED | OUTPATIENT
Start: 2019-06-10 | End: 2019-06-10

## 2019-06-10 RX ORDER — SODIUM CHLORIDE 9 MG/ML
250 INJECTION INTRAMUSCULAR; INTRAVENOUS; SUBCUTANEOUS ONCE
Refills: 0 | Status: COMPLETED | OUTPATIENT
Start: 2019-06-10 | End: 2019-06-10

## 2019-06-10 RX ORDER — CALCIUM GLUCONATE 100 MG/ML
1 VIAL (ML) INTRAVENOUS ONCE
Refills: 0 | Status: COMPLETED | OUTPATIENT
Start: 2019-06-10 | End: 2019-06-10

## 2019-06-10 RX ORDER — ALBUTEROL 90 UG/1
10 AEROSOL, METERED ORAL ONCE
Refills: 0 | Status: COMPLETED | OUTPATIENT
Start: 2019-06-10 | End: 2019-06-10

## 2019-06-10 RX ORDER — INSULIN HUMAN 100 [IU]/ML
10 INJECTION, SOLUTION SUBCUTANEOUS ONCE
Refills: 0 | Status: DISCONTINUED | OUTPATIENT
Start: 2019-06-10 | End: 2019-06-10

## 2019-06-10 RX ORDER — INSULIN HUMAN 100 [IU]/ML
6 INJECTION, SOLUTION SUBCUTANEOUS ONCE
Refills: 0 | Status: COMPLETED | OUTPATIENT
Start: 2019-06-10 | End: 2019-06-10

## 2019-06-10 RX ADMIN — INSULIN HUMAN 4 UNIT(S): 100 INJECTION, SOLUTION SUBCUTANEOUS at 23:53

## 2019-06-10 RX ADMIN — INSULIN HUMAN 6 UNIT(S): 100 INJECTION, SOLUTION SUBCUTANEOUS at 22:50

## 2019-06-10 RX ADMIN — Medication 200 GRAM(S): at 22:50

## 2019-06-10 RX ADMIN — SODIUM CHLORIDE 500 MILLILITER(S): 9 INJECTION INTRAMUSCULAR; INTRAVENOUS; SUBCUTANEOUS at 23:30

## 2019-06-10 NOTE — ED PROVIDER NOTE - CROS ED GI ALL NEG
Outpatient Progress Note      CHIEF COMPLAINT:    Chief Complaint   Patient presents with   • Toe Pain        HISTORY OF PRESENT ILLNESS:  The patient is a 85 year old female who presents for complaints of an infected toe.  Started days ago.  Onset was acute.  Symptoms described as swelling redness on right 1st toe.  Severity described as moderate.  Symptoms occurring constantly.  Associated symptoms include none.  Alleviated by nothing.  Aggravated by nothing.    Past Medical History:   No past medical history on file.    Past Surgical History:   No past surgical history on file.    Current Medications:    Current Outpatient Prescriptions   Medication Sig Dispense Refill   • doxycycline hyclate (VIBRAMYCIN) 100 MG capsule Take 100 mg by mouth 2 times daily.     • gabapentin (NEURONTIN) 400 MG capsule Take 400 mg by mouth 2 times daily.     • lovastatin (MEVACOR) 20 MG tablet Take 20 mg by mouth nightly.     • DULoxetine (CYMBALTA) 60 MG capsule Take 1 capsule by mouth daily.     • sulfamethoxazole-trimethoprim (BACTRIM DS,SEPTRA DS) 800-160 MG per tablet Take 1 tablet by mouth 2 times daily for 7 days. 14 tablet 0     No current facility-administered medications for this visit.        Allergies:    ALLERGIES:  No Known Allergies    SOCIAL HISTORY:  Social History   Substance Use Topics   • Smoking status: Never Smoker   • Smokeless tobacco: Not on file   • Alcohol use Not on file         Drug Use:    Not Asked         FAMILY HISTORY:  No family history on file.    REVIEW OF SYSTEMS:   CONSTITUTIONAL:  No Fevers/Chills  INTEGUMENTARY:  No Pruritus/Lumps, Positive for Infected Toe  HEMATOLOGIC/LYMPHATIC:  No enlarged lymph nodes  NEUROLOGICAL:  No Numbness/Tingling    PHYSICAL EXAM:   Visit Vitals  /65   Pulse 82   Temp 98.4 °F (36.9 °C) (Oral)   Resp 16   Ht 5' 7\" (1.702 m)   Wt 68 kg   BMI 23.49 kg/m²       CONSTITUTIONAL: No acute distress, Non-toxic appearing  MUSCULOSKELETAL:  No Clubbing/Cyanosis/Edema,  Capillary Refill<2 seconds  SKIN:  Warm, Dry.  Inspection and palpation reveals right 1st toe paronychia no fluctuance  PSYCHIATRIC:  The patient is well-nourished and well-groomed.  Alert & Oriented x 3.  Able to articulate well with normal speech/language, rate, volume and coherence.  Recent and remote memory intact.  The patient's mood and affect are described as not anxious or depressed. Associations are intact.  Judgment and Insight are appropriate concerning matters relevant to self    DATA:  None available    ASSESSMENT AND PLAN:   (L03.031) Paronychia of great toe of right foot  (primary encounter diagnosis)  Comment: Warm soaks/AS below. The indication, risks, benefits, potential side effects, and drug interactions of medication(s) were reviewed with the patient.  Alternative treatment options discussed and reviewed with the patient.  Medication instructions and consequences of not taking the medications were discussed with the patient.  Patient expressed understanding and he/she agreed to the plan.   Plan: sulfamethoxazole-trimethoprim (BACTRIM         DS,SEPTRA DS) 800-160 MG per tablet              Kit Levy MD    - - -

## 2019-06-10 NOTE — ED PROVIDER NOTE - PROGRESS NOTE DETAILS
attending John: evaluated by MICU. Pt likely to be appropriate for dialysis on tele bed. Awaiting call back from renal. attending John: discussed with Daisy Burger who will come in to see patient and write dialysis orders. attending John: discussed again with MICU. Pt to be admitted to tele bed and will receive emergent dialysis tonight. Accepted under Dr. Viera

## 2019-06-10 NOTE — ED PROVIDER NOTE - OBJECTIVE STATEMENT
62 yo female with uncontrolled type 1 diabetes (well known from multiple prior admissions) DM c/b ESRD on HD, neuropathy, CAD, CVA, CHF and multiple cardiac stents presents w/ hyperglycemia. Patient gets dialysis Tu/Thur/Sat, and extra mini-session on Mondays, had 2.5 L Taken off today.  checked BG after dialysis, was in 300's, and then read high after he gave night time Lantus. Patient on 2 Units Lantus qHS and 2 Humalog before all 3 meals. Patient currently c/o some L foot pain. Bethanie any f/c, N/V/D, abd pain. Makes small amount of urine. Denies any hx of trauma.

## 2019-06-10 NOTE — ED PROVIDER NOTE - ATTENDING CONTRIBUTION TO CARE
attending John: 63yF h/o poorly controlled DMI with multiple prior admission for hyperglycemia, ESRD on dialysis (incomplete dialysis today), neuropathy, CAD, CVA, CHF and multiple cardiac stents BIBEMS from home for hyperglycemia. FSG at home reportedly in 300s. No reported trauma. Pt also confused from baseline. On arrival, pt ill appearing, oriented x 3 but slow to respond to questions. Will obtain FSG, labs, ekg, place on [facility], [facility] for AMS, admit

## 2019-06-10 NOTE — ED PROVIDER NOTE - MUSCULOSKELETAL, MLM
Strength appropriate for age and habitus. Full active range of motion of all 4 extremities. No joint swelling, calor, or deformities. No calf swelling or tenderness. No C-Spine tenderness. LUE Fistula w/ thrill

## 2019-06-10 NOTE — ED PROVIDER NOTE - CLINICAL SUMMARY MEDICAL DECISION MAKING FREE TEXT BOX
Jorge Witt (Resident): ESRD on HD, recent admit for hypoglycemia, states been taking insulin regularly (both her and her  manage insulin) presents w/ glucose reading "high" per EMS - no complaints other than some L toe pain/neuropathy - looks well, slow to respond to questions but unsure of baseline - will check labs, look for sign of infection, and admit

## 2019-06-10 NOTE — ED PROVIDER NOTE - CARE PLAN
Principal Discharge DX:	Hyperkalemia  Secondary Diagnosis:	Hyperglycemia  Secondary Diagnosis:	ESRD (end stage renal disease) on dialysis

## 2019-06-11 DIAGNOSIS — I10 ESSENTIAL (PRIMARY) HYPERTENSION: ICD-10-CM

## 2019-06-11 DIAGNOSIS — E10.65 TYPE 1 DIABETES MELLITUS WITH HYPERGLYCEMIA: ICD-10-CM

## 2019-06-11 DIAGNOSIS — R60.0 LOCALIZED EDEMA: ICD-10-CM

## 2019-06-11 DIAGNOSIS — R09.89 OTHER SPECIFIED SYMPTOMS AND SIGNS INVOLVING THE CIRCULATORY AND RESPIRATORY SYSTEMS: ICD-10-CM

## 2019-06-11 DIAGNOSIS — E87.5 HYPERKALEMIA: ICD-10-CM

## 2019-06-11 DIAGNOSIS — E78.5 HYPERLIPIDEMIA, UNSPECIFIED: ICD-10-CM

## 2019-06-11 DIAGNOSIS — G89.29 OTHER CHRONIC PAIN: ICD-10-CM

## 2019-06-11 DIAGNOSIS — N18.6 END STAGE RENAL DISEASE: ICD-10-CM

## 2019-06-11 DIAGNOSIS — I25.10 ATHEROSCLEROTIC HEART DISEASE OF NATIVE CORONARY ARTERY WITHOUT ANGINA PECTORIS: ICD-10-CM

## 2019-06-11 DIAGNOSIS — Z29.9 ENCOUNTER FOR PROPHYLACTIC MEASURES, UNSPECIFIED: ICD-10-CM

## 2019-06-11 DIAGNOSIS — I50.23 ACUTE ON CHRONIC SYSTOLIC (CONGESTIVE) HEART FAILURE: ICD-10-CM

## 2019-06-11 LAB
ALBUMIN SERPL ELPH-MCNC: 3.9 G/DL — SIGNIFICANT CHANGE UP (ref 3.3–5)
ALP SERPL-CCNC: 72 U/L — SIGNIFICANT CHANGE UP (ref 40–120)
ALT FLD-CCNC: 14 U/L — SIGNIFICANT CHANGE UP (ref 10–45)
ANION GAP SERPL CALC-SCNC: 17 MMOL/L — SIGNIFICANT CHANGE UP (ref 5–17)
ANION GAP SERPL CALC-SCNC: 18 MMOL/L — HIGH (ref 5–17)
ANION GAP SERPL CALC-SCNC: 19 MMOL/L — HIGH (ref 5–17)
AST SERPL-CCNC: 18 U/L — SIGNIFICANT CHANGE UP (ref 10–40)
BASOPHILS # BLD AUTO: 0 K/UL — SIGNIFICANT CHANGE UP (ref 0–0.2)
BASOPHILS NFR BLD AUTO: 0 % — SIGNIFICANT CHANGE UP (ref 0–2)
BILIRUB SERPL-MCNC: 0.3 MG/DL — SIGNIFICANT CHANGE UP (ref 0.2–1.2)
BUN SERPL-MCNC: 22 MG/DL — SIGNIFICANT CHANGE UP (ref 7–23)
BUN SERPL-MCNC: 23 MG/DL — SIGNIFICANT CHANGE UP (ref 7–23)
BUN SERPL-MCNC: 70 MG/DL — HIGH (ref 7–23)
CALCIUM SERPL-MCNC: 9.1 MG/DL — SIGNIFICANT CHANGE UP (ref 8.4–10.5)
CALCIUM SERPL-MCNC: 9.1 MG/DL — SIGNIFICANT CHANGE UP (ref 8.4–10.5)
CALCIUM SERPL-MCNC: 9.8 MG/DL — SIGNIFICANT CHANGE UP (ref 8.4–10.5)
CHLORIDE SERPL-SCNC: 91 MMOL/L — LOW (ref 96–108)
CHLORIDE SERPL-SCNC: 92 MMOL/L — LOW (ref 96–108)
CHLORIDE SERPL-SCNC: 94 MMOL/L — LOW (ref 96–108)
CO2 SERPL-SCNC: 23 MMOL/L — SIGNIFICANT CHANGE UP (ref 22–31)
CO2 SERPL-SCNC: 25 MMOL/L — SIGNIFICANT CHANGE UP (ref 22–31)
CO2 SERPL-SCNC: 25 MMOL/L — SIGNIFICANT CHANGE UP (ref 22–31)
CREAT SERPL-MCNC: 2.79 MG/DL — HIGH (ref 0.5–1.3)
CREAT SERPL-MCNC: 2.81 MG/DL — HIGH (ref 0.5–1.3)
CREAT SERPL-MCNC: 6.34 MG/DL — HIGH (ref 0.5–1.3)
EOSINOPHIL # BLD AUTO: 0.13 K/UL — SIGNIFICANT CHANGE UP (ref 0–0.5)
EOSINOPHIL NFR BLD AUTO: 3.6 % — SIGNIFICANT CHANGE UP (ref 0–6)
GLUCOSE BLDC GLUCOMTR-MCNC: 194 MG/DL — HIGH (ref 70–99)
GLUCOSE BLDC GLUCOMTR-MCNC: 217 MG/DL — HIGH (ref 70–99)
GLUCOSE BLDC GLUCOMTR-MCNC: 231 MG/DL — HIGH (ref 70–99)
GLUCOSE BLDC GLUCOMTR-MCNC: 272 MG/DL — HIGH (ref 70–99)
GLUCOSE BLDC GLUCOMTR-MCNC: 293 MG/DL — HIGH (ref 70–99)
GLUCOSE BLDC GLUCOMTR-MCNC: 422 MG/DL — HIGH (ref 70–99)
GLUCOSE BLDC GLUCOMTR-MCNC: 459 MG/DL — CRITICAL HIGH (ref 70–99)
GLUCOSE BLDC GLUCOMTR-MCNC: 478 MG/DL — CRITICAL HIGH (ref 70–99)
GLUCOSE SERPL-MCNC: 208 MG/DL — HIGH (ref 70–99)
GLUCOSE SERPL-MCNC: 209 MG/DL — HIGH (ref 70–99)
GLUCOSE SERPL-MCNC: 442 MG/DL — HIGH (ref 70–99)
HCT VFR BLD CALC: 29.6 % — LOW (ref 34.5–45)
HGB BLD-MCNC: 9.5 G/DL — LOW (ref 11.5–15.5)
LYMPHOCYTES # BLD AUTO: 0.19 K/UL — LOW (ref 1–3.3)
LYMPHOCYTES # BLD AUTO: 5.4 % — LOW (ref 13–44)
MAGNESIUM SERPL-MCNC: 2 MG/DL — SIGNIFICANT CHANGE UP (ref 1.6–2.6)
MAGNESIUM SERPL-MCNC: 2.2 MG/DL — SIGNIFICANT CHANGE UP (ref 1.6–2.6)
MCHC RBC-ENTMCNC: 32.1 GM/DL — SIGNIFICANT CHANGE UP (ref 32–36)
MCHC RBC-ENTMCNC: 33.5 PG — SIGNIFICANT CHANGE UP (ref 27–34)
MCV RBC AUTO: 104.2 FL — HIGH (ref 80–100)
MONOCYTES # BLD AUTO: 0.28 K/UL — SIGNIFICANT CHANGE UP (ref 0–0.9)
MONOCYTES NFR BLD AUTO: 8.1 % — SIGNIFICANT CHANGE UP (ref 2–14)
NEUTROPHILS # BLD AUTO: 2.88 K/UL — SIGNIFICANT CHANGE UP (ref 1.8–7.4)
NEUTROPHILS NFR BLD AUTO: 82 % — HIGH (ref 43–77)
PHOSPHATE SERPL-MCNC: 3.3 MG/DL — SIGNIFICANT CHANGE UP (ref 2.5–4.5)
PHOSPHATE SERPL-MCNC: 5.5 MG/DL — HIGH (ref 2.5–4.5)
PLATELET # BLD AUTO: 199 K/UL — SIGNIFICANT CHANGE UP (ref 150–400)
POTASSIUM SERPL-MCNC: 3.9 MMOL/L — SIGNIFICANT CHANGE UP (ref 3.5–5.3)
POTASSIUM SERPL-MCNC: 3.9 MMOL/L — SIGNIFICANT CHANGE UP (ref 3.5–5.3)
POTASSIUM SERPL-MCNC: 5 MMOL/L — SIGNIFICANT CHANGE UP (ref 3.5–5.3)
POTASSIUM SERPL-MCNC: 5.7 MMOL/L — HIGH (ref 3.5–5.3)
POTASSIUM SERPL-SCNC: 3.9 MMOL/L — SIGNIFICANT CHANGE UP (ref 3.5–5.3)
POTASSIUM SERPL-SCNC: 3.9 MMOL/L — SIGNIFICANT CHANGE UP (ref 3.5–5.3)
POTASSIUM SERPL-SCNC: 5 MMOL/L — SIGNIFICANT CHANGE UP (ref 3.5–5.3)
POTASSIUM SERPL-SCNC: 5.7 MMOL/L — HIGH (ref 3.5–5.3)
PROT SERPL-MCNC: 6.8 G/DL — SIGNIFICANT CHANGE UP (ref 6–8.3)
RBC # BLD: 2.84 M/UL — LOW (ref 3.8–5.2)
RBC # FLD: 13.9 % — SIGNIFICANT CHANGE UP (ref 10.3–14.5)
SODIUM SERPL-SCNC: 134 MMOL/L — LOW (ref 135–145)
SODIUM SERPL-SCNC: 135 MMOL/L — SIGNIFICANT CHANGE UP (ref 135–145)
SODIUM SERPL-SCNC: 135 MMOL/L — SIGNIFICANT CHANGE UP (ref 135–145)
WBC # BLD: 3.48 K/UL — LOW (ref 3.8–10.5)
WBC # FLD AUTO: 3.48 K/UL — LOW (ref 3.8–10.5)

## 2019-06-11 PROCEDURE — 93010 ELECTROCARDIOGRAM REPORT: CPT

## 2019-06-11 PROCEDURE — 99232 SBSQ HOSP IP/OBS MODERATE 35: CPT

## 2019-06-11 PROCEDURE — 99233 SBSQ HOSP IP/OBS HIGH 50: CPT

## 2019-06-11 PROCEDURE — 99223 1ST HOSP IP/OBS HIGH 75: CPT

## 2019-06-11 PROCEDURE — 99223 1ST HOSP IP/OBS HIGH 75: CPT | Mod: GC

## 2019-06-11 RX ORDER — DEXTROSE 50 % IN WATER 50 %
12.5 SYRINGE (ML) INTRAVENOUS ONCE
Refills: 0 | Status: DISCONTINUED | OUTPATIENT
Start: 2019-06-11 | End: 2019-06-11

## 2019-06-11 RX ORDER — DEXTROSE 50 % IN WATER 50 %
25 SYRINGE (ML) INTRAVENOUS ONCE
Refills: 0 | Status: DISCONTINUED | OUTPATIENT
Start: 2019-06-11 | End: 2019-06-11

## 2019-06-11 RX ORDER — METOPROLOL TARTRATE 50 MG
50 TABLET ORAL DAILY
Refills: 0 | Status: DISCONTINUED | OUTPATIENT
Start: 2019-06-11 | End: 2019-06-12

## 2019-06-11 RX ORDER — HYDRALAZINE HCL 50 MG
25 TABLET ORAL THREE TIMES A DAY
Refills: 0 | Status: DISCONTINUED | OUTPATIENT
Start: 2019-06-11 | End: 2019-06-12

## 2019-06-11 RX ORDER — CLOPIDOGREL BISULFATE 75 MG/1
75 TABLET, FILM COATED ORAL DAILY
Refills: 0 | Status: DISCONTINUED | OUTPATIENT
Start: 2019-06-11 | End: 2019-06-12

## 2019-06-11 RX ORDER — DEXTROSE 50 % IN WATER 50 %
25 SYRINGE (ML) INTRAVENOUS ONCE
Refills: 0 | Status: DISCONTINUED | OUTPATIENT
Start: 2019-06-11 | End: 2019-06-12

## 2019-06-11 RX ORDER — GLUCAGON INJECTION, SOLUTION 0.5 MG/.1ML
1 INJECTION, SOLUTION SUBCUTANEOUS ONCE
Refills: 0 | Status: DISCONTINUED | OUTPATIENT
Start: 2019-06-11 | End: 2019-06-12

## 2019-06-11 RX ORDER — INSULIN GLARGINE 100 [IU]/ML
4 INJECTION, SOLUTION SUBCUTANEOUS AT BEDTIME
Refills: 0 | Status: DISCONTINUED | OUTPATIENT
Start: 2019-06-11 | End: 2019-06-12

## 2019-06-11 RX ORDER — INSULIN GLARGINE 100 [IU]/ML
4 INJECTION, SOLUTION SUBCUTANEOUS ONCE
Refills: 0 | Status: COMPLETED | OUTPATIENT
Start: 2019-06-11 | End: 2019-06-11

## 2019-06-11 RX ORDER — INSULIN LISPRO 100/ML
VIAL (ML) SUBCUTANEOUS AT BEDTIME
Refills: 0 | Status: DISCONTINUED | OUTPATIENT
Start: 2019-06-11 | End: 2019-06-12

## 2019-06-11 RX ORDER — HEPARIN SODIUM 5000 [USP'U]/ML
5000 INJECTION INTRAVENOUS; SUBCUTANEOUS EVERY 8 HOURS
Refills: 0 | Status: DISCONTINUED | OUTPATIENT
Start: 2019-06-11 | End: 2019-06-12

## 2019-06-11 RX ORDER — INSULIN DETEMIR 100/ML (3)
4 INSULIN PEN (ML) SUBCUTANEOUS
Qty: 0 | Refills: 0 | DISCHARGE

## 2019-06-11 RX ORDER — DEXTROSE 50 % IN WATER 50 %
15 SYRINGE (ML) INTRAVENOUS ONCE
Refills: 0 | Status: DISCONTINUED | OUTPATIENT
Start: 2019-06-11 | End: 2019-06-11

## 2019-06-11 RX ORDER — SODIUM CHLORIDE 9 MG/ML
1000 INJECTION, SOLUTION INTRAVENOUS
Refills: 0 | Status: DISCONTINUED | OUTPATIENT
Start: 2019-06-11 | End: 2019-06-11

## 2019-06-11 RX ORDER — GLUCAGON INJECTION, SOLUTION 0.5 MG/.1ML
1 INJECTION, SOLUTION SUBCUTANEOUS ONCE
Refills: 0 | Status: DISCONTINUED | OUTPATIENT
Start: 2019-06-11 | End: 2019-06-11

## 2019-06-11 RX ORDER — INSULIN LISPRO 100/ML
5 VIAL (ML) SUBCUTANEOUS ONCE
Refills: 0 | Status: COMPLETED | OUTPATIENT
Start: 2019-06-11 | End: 2019-06-11

## 2019-06-11 RX ORDER — ATORVASTATIN CALCIUM 80 MG/1
20 TABLET, FILM COATED ORAL AT BEDTIME
Refills: 0 | Status: DISCONTINUED | OUTPATIENT
Start: 2019-06-11 | End: 2019-06-12

## 2019-06-11 RX ORDER — ASPIRIN/CALCIUM CARB/MAGNESIUM 324 MG
81 TABLET ORAL DAILY
Refills: 0 | Status: DISCONTINUED | OUTPATIENT
Start: 2019-06-11 | End: 2019-06-12

## 2019-06-11 RX ORDER — SODIUM CHLORIDE 9 MG/ML
1000 INJECTION, SOLUTION INTRAVENOUS
Refills: 0 | Status: DISCONTINUED | OUTPATIENT
Start: 2019-06-11 | End: 2019-06-12

## 2019-06-11 RX ORDER — DEXTROSE 50 % IN WATER 50 %
12.5 SYRINGE (ML) INTRAVENOUS ONCE
Refills: 0 | Status: DISCONTINUED | OUTPATIENT
Start: 2019-06-11 | End: 2019-06-12

## 2019-06-11 RX ORDER — FUROSEMIDE 40 MG
20 TABLET ORAL
Refills: 0 | Status: DISCONTINUED | OUTPATIENT
Start: 2019-06-11 | End: 2019-06-12

## 2019-06-11 RX ORDER — INSULIN LISPRO 100/ML
VIAL (ML) SUBCUTANEOUS
Refills: 0 | Status: DISCONTINUED | OUTPATIENT
Start: 2019-06-11 | End: 2019-06-11

## 2019-06-11 RX ORDER — HYDRALAZINE HCL 50 MG
1 TABLET ORAL
Qty: 0 | Refills: 0 | DISCHARGE

## 2019-06-11 RX ORDER — DEXTROSE 50 % IN WATER 50 %
15 SYRINGE (ML) INTRAVENOUS ONCE
Refills: 0 | Status: DISCONTINUED | OUTPATIENT
Start: 2019-06-11 | End: 2019-06-12

## 2019-06-11 RX ORDER — INSULIN LISPRO 100/ML
VIAL (ML) SUBCUTANEOUS
Refills: 0 | Status: DISCONTINUED | OUTPATIENT
Start: 2019-06-11 | End: 2019-06-12

## 2019-06-11 RX ORDER — DULOXETINE HYDROCHLORIDE 30 MG/1
60 CAPSULE, DELAYED RELEASE ORAL DAILY
Refills: 0 | Status: DISCONTINUED | OUTPATIENT
Start: 2019-06-11 | End: 2019-06-11

## 2019-06-11 RX ORDER — LISINOPRIL 2.5 MG/1
40 TABLET ORAL DAILY
Refills: 0 | Status: DISCONTINUED | OUTPATIENT
Start: 2019-06-11 | End: 2019-06-12

## 2019-06-11 RX ORDER — INSULIN LISPRO 100/ML
3 VIAL (ML) SUBCUTANEOUS
Refills: 0 | Status: DISCONTINUED | OUTPATIENT
Start: 2019-06-11 | End: 2019-06-12

## 2019-06-11 RX ORDER — OXYCODONE AND ACETAMINOPHEN 5; 325 MG/1; MG/1
1 TABLET ORAL EVERY 6 HOURS
Refills: 0 | Status: DISCONTINUED | OUTPATIENT
Start: 2019-06-11 | End: 2019-06-12

## 2019-06-11 RX ORDER — SEVELAMER CARBONATE 2400 MG/1
1600 POWDER, FOR SUSPENSION ORAL
Refills: 0 | Status: DISCONTINUED | OUTPATIENT
Start: 2019-06-11 | End: 2019-06-12

## 2019-06-11 RX ORDER — INSULIN LISPRO 100/ML
VIAL (ML) SUBCUTANEOUS AT BEDTIME
Refills: 0 | Status: DISCONTINUED | OUTPATIENT
Start: 2019-06-11 | End: 2019-06-11

## 2019-06-11 RX ADMIN — OXYCODONE AND ACETAMINOPHEN 1 TABLET(S): 5; 325 TABLET ORAL at 16:02

## 2019-06-11 RX ADMIN — ATORVASTATIN CALCIUM 20 MILLIGRAM(S): 80 TABLET, FILM COATED ORAL at 22:12

## 2019-06-11 RX ADMIN — CLOPIDOGREL BISULFATE 75 MILLIGRAM(S): 75 TABLET, FILM COATED ORAL at 12:15

## 2019-06-11 RX ADMIN — Medication 1: at 12:16

## 2019-06-11 RX ADMIN — Medication 5 UNIT(S): at 01:32

## 2019-06-11 RX ADMIN — LISINOPRIL 40 MILLIGRAM(S): 2.5 TABLET ORAL at 06:55

## 2019-06-11 RX ADMIN — Medication 81 MILLIGRAM(S): at 12:15

## 2019-06-11 RX ADMIN — DULOXETINE HYDROCHLORIDE 60 MILLIGRAM(S): 30 CAPSULE, DELAYED RELEASE ORAL at 12:15

## 2019-06-11 RX ADMIN — OXYCODONE AND ACETAMINOPHEN 1 TABLET(S): 5; 325 TABLET ORAL at 03:51

## 2019-06-11 RX ADMIN — Medication 2: at 08:12

## 2019-06-11 RX ADMIN — SEVELAMER CARBONATE 1600 MILLIGRAM(S): 2400 POWDER, FOR SUSPENSION ORAL at 08:13

## 2019-06-11 RX ADMIN — OXYCODONE AND ACETAMINOPHEN 1 TABLET(S): 5; 325 TABLET ORAL at 03:21

## 2019-06-11 RX ADMIN — Medication 25 MILLIGRAM(S): at 06:55

## 2019-06-11 RX ADMIN — ALBUTEROL 2.5 MILLIGRAM(S): 90 AEROSOL, METERED ORAL at 00:01

## 2019-06-11 RX ADMIN — INSULIN GLARGINE 4 UNIT(S): 100 INJECTION, SOLUTION SUBCUTANEOUS at 22:12

## 2019-06-11 RX ADMIN — OXYCODONE AND ACETAMINOPHEN 1 TABLET(S): 5; 325 TABLET ORAL at 16:32

## 2019-06-11 RX ADMIN — Medication 25 MILLIGRAM(S): at 16:02

## 2019-06-11 RX ADMIN — Medication 1 TABLET(S): at 12:15

## 2019-06-11 RX ADMIN — Medication 50 MILLIGRAM(S): at 12:15

## 2019-06-11 RX ADMIN — Medication 3 UNIT(S): at 08:12

## 2019-06-11 RX ADMIN — Medication 25 MILLIGRAM(S): at 22:12

## 2019-06-11 RX ADMIN — SEVELAMER CARBONATE 1600 MILLIGRAM(S): 2400 POWDER, FOR SUSPENSION ORAL at 17:32

## 2019-06-11 RX ADMIN — SEVELAMER CARBONATE 1600 MILLIGRAM(S): 2400 POWDER, FOR SUSPENSION ORAL at 12:16

## 2019-06-11 RX ADMIN — INSULIN GLARGINE 4 UNIT(S): 100 INJECTION, SOLUTION SUBCUTANEOUS at 01:31

## 2019-06-11 RX ADMIN — Medication 3 UNIT(S): at 17:32

## 2019-06-11 RX ADMIN — Medication 3 UNIT(S): at 12:16

## 2019-06-11 RX ADMIN — Medication 3: at 17:32

## 2019-06-11 RX ADMIN — Medication 200 GRAM(S): at 00:00

## 2019-06-11 NOTE — CONSULT NOTE ADULT - ATTENDING COMMENTS
Patient is seen and examined.   64 yo with ESRD (on HD M, tue, Thur, Sat), DM, CHF (EF 30%), MD with 8 hospital admissions since the beginning of this year with hyperglycemia and/or hyperkalemia admitted same.   -- EKG with no e/o peaked T waves. patient given hyper-K treatment in ER. To be dialyzed on telemetry floor now. Minor trop elevation, trend  - hyperglycemia with bicarb of 22; AG 18; Bhb of 0.9 - would treat with insulin, monitor FS and repeat BMP.  Not MICU candidate at this time. Please reconsult as needed.
Pt seen and examined. Agree with above with addendum: discussed with patient her wishes upon discharge and she would like to go home. She feels that she can manage her diabetes ok. Outpatient f/u with Dr. Pina Ley.  Paris Colbert MD  [phone]

## 2019-06-11 NOTE — CONSULT NOTE ADULT - SUBJECTIVE AND OBJECTIVE BOX
pt seen and examined. lungs wheezing coarse 3+ LE edema k 7.1 glu > 500.   hd for hyperkalemia and chf. insulin to cont for hyperglycemia.   full consult to follow.   HD RN called pt seen and examined. lungs wheezing coarse 3+ LE edema k 7.1 glu > 500.   hd for hyperkalemia and chf. insulin to cont for hyperglycemia.   full consult to follow.   HD RN called       addendum:      [facility]  [phone]  NEPHROLOGY      INITIAL CONSULT NOTE  --------------------------------------------------------------------------------  HPI:      63 F  PMH CAD Sp PTCA w stents CHF,COPD, CVA, DMT1, ESRD on HD (M,Tu,Th,Sa), Gout, HLD, PVD, Sublcavian Stensosis (L) sp stent. PSH ASD Repair,idania,fem-pop bypass, recurrent admission for hyper/hypoglycemia/DKA, p/w cc of hyperglycemia as outpatient.  Pt states that she is unaware of her exact insulin regimen but knows she takes Lantus qhs and humalog premeal, often eating up to 4-5 meals a day; her  helps with insulin regimen, and is not at bedside.  Her last Lantus dose was the night preceding her presentation to ER. States on repeated FSG measurements during the day, her fsg kept increasing, first in 300s then reading "high." Pt last had HD on 6/10, lasting about 2.5 hours. Pt states her usual dry weight tyron bout 54-56 kg. She currently denies cp, +mild sob, denies f/c, n/v/d, dysuria     Vs: 97.6, 85, 168/79, 18, 97% Ra. Labs: no leukocytosis, chronic stable anemia, chronic stable macrocytosis, plt wnl, coags wnl, cmp c/w ESRD, hyperK 7.1, AG 18, bicarb 22, BHB 0.9, ,  --> 505 --> 542 --> 478, VBG pH 7.35, lactate 1.5.  CXR prelim no focal consolidations. CTH no acute bleed/infarct/shift/mass effect, +chronic lacunar infarcts and chronic microvascular changes. IN ER, pt received, calcium gluconate x 2, albuterol nebs, insulin R 4u x 1, Insulin R 6u x 1, humalog 5u x 1, Lantus 4u x 1, 250 cc NS bolus x 1, prior to medicine team involvement.   renal consult called given hyperkalemia      PAST HISTORY  --------------------------------------------------------------------------------  PAST MEDICAL & SURGICAL HISTORY:  COPD (chronic obstructive pulmonary disease)  Localized enlarged lymph nodes  CHF (congestive heart failure): EF 40-45%  Subclavian vein stenosis, left: s/p stent  DKA, type 1: 1/2015  ACS (acute coronary syndrome): 1/2015 - cath revealed 100% ostial stenosis not amenable to PCI - medical management  TIA (transient ischemic attack): x 2 - 8-9 years ago prior to ASD/VSD repair  CAD (coronary artery disease): s/p stents  Gout: past  CVA (cerebral infarction): with no residual, 8 yrs ago, prior to heart surgery - ST memory loss  Peripheral vascular disease: occluded left fem-pop bypass 5/2015  Diabetes mellitus type 1: Insulin Dependent -  ESRD (end stage renal disease): dialysis  M, tue, thursday, saturday  Hyperlipidemia  Status post device closure of ASD: &quot;[place]&quot;  History of cardiac catheterization: 1/2015 - no intervention  S/P femoral-popliteal bypass surgery: L and R in 2013 with graft; 5/2015 CFA angioplasty left and ileofemoral endarterectomywith vein patch angioplasty of left fem-pop bypass graft  Multiple vascular surgery both leg, left fempop bypass revision 11/2015  AV (arteriovenous fistula): Left AV graft; revision with stent placement 2-3 years ago  S/P cholecystectomy    FAMILY HISTORY:  Family history of smoking  Family history of hypertension  Family history of cancer (Sibling)    PAST SOCIAL HISTORY:   past tobacco use   ALLERGIES & MEDICATIONS  --------------------------------------------------------------------------------  Allergies    No Known Allergies    Intolerances      Standing Inpatient Medications  aspirin enteric coated 81 milliGRAM(s) Oral daily  atorvastatin 20 milliGRAM(s) Oral at bedtime  clopidogrel Tablet 75 milliGRAM(s) Oral daily  dextrose 5%. 1000 milliLiter(s) IV Continuous <Continuous>  dextrose 50% Injectable 12.5 Gram(s) IV Push once  dextrose 50% Injectable 25 Gram(s) IV Push once  dextrose 50% Injectable 25 Gram(s) IV Push once  furosemide    Tablet 20 milliGRAM(s) Oral <User Schedule>  heparin  Injectable 5000 Unit(s) SubCutaneous every 8 hours  hydrALAZINE 25 milliGRAM(s) Oral three times a day  insulin glargine Injectable (LANTUS) 4 Unit(s) SubCutaneous at bedtime  insulin lispro (HumaLOG) corrective regimen sliding scale   SubCutaneous three times a day before meals  insulin lispro (HumaLOG) corrective regimen sliding scale   SubCutaneous at bedtime  insulin lispro Injectable (HumaLOG) 3 Unit(s) SubCutaneous three times a day before meals  lisinopril 40 milliGRAM(s) Oral daily  metoprolol succinate ER 50 milliGRAM(s) Oral daily  multivitamin 1 Tablet(s) Oral daily  sevelamer carbonate 1600 milliGRAM(s) Oral three times a day with meals    PRN Inpatient Medications  dextrose 40% Gel 15 Gram(s) Oral once PRN  glucagon  Injectable 1 milliGRAM(s) IntraMuscular once PRN  oxyCODONE    5 mG/acetaminophen 325 mG 1 Tablet(s) Oral every 6 hours PRN      REVIEW OF SYSTEMS  --------------------------------------------------------------------------------  Gen: No  fevers/chills   Skin: No rashes  Head/Eyes/Ears/Mouth: No headache; Normal hearing;  No sinus pain/discomfort, sore throat  Respiratory: +  dyspnea, cough, wheezing, hemoptysis  CV: No chest pain, orthopnea +   GI: No abdominal pain, diarrhea, nausea, vomiting, melena  : No dysuria,  no uo   MSK: No joint pain/swelling; no back pain  Neuro: No dizziness/lightheadedness,   + edema in leg   All other systems were reviewed and are negative, except as noted.    VITALS/PHYSICAL EXAM  --------------------------------------------------------------------------------  T(C): 36.3 (06-11-19 @ 12:12), Max: 37.1 (06-10-19 @ 21:10)  HR: 81 (06-11-19 @ 16:02) (71 - 105)  BP: 166/85 (06-11-19 @ 16:02) (137/57 - 168/79)  RR: 18 (06-11-19 @ 12:12) (17 - 18)  SpO2: 98% (06-11-19 @ 12:12) (95% - 100%)  Wt(kg): --    Weight (kg): 45.4 (06-10-19 @ 21:10)      06-10-19 @ 07:01  -  06-11-19 @ 07:00  --------------------------------------------------------  IN: 0 mL / OUT: 2500 mL / NET: -2500 mL    06-11-19 @ 07:01  -  06-11-19 @ 17:53  --------------------------------------------------------  IN: 260 mL / OUT: 0 mL / NET: 260 mL      Physical Exam:  	Gen: chronically ill appearing mild sob   	no jvd   	Pulm: decrease bs  +  rales no  ronchi no  wheezing  	CV: RRR, S1S2; no rub  	Back: No CVA tenderness  	Abd: +BS, soft, nontender/nondistended  	: No suprapubic tenderness  	UE: Warm, no cyanosis  no clubbing,  no edema; no asterixis  	LE: Warm, no cyanosis  no clubbing, 2-3 + pitting  edema  	Neuro: alert and oriented. speech  	Skin: Warm, no decrease skin turgor   	Vascular access: + avf + bruit and thrill     LABS/STUDIES  --------------------------------------------------------------------------------              9.5    3.48  >-----------<  199      [06-11-19 @ 08:31]              29.6     135  |  91  |  23  ----------------------------<  208      [06-11-19 @ 07:04]  3.9   |  25  |  2.81        Ca     9.1     [06-11-19 @ 07:04]      Mg     2.0     [06-11-19 @ 07:04]      Phos  3.3     [06-11-19 @ 07:04]    TPro  6.8  /  Alb  3.9  /  TBili  0.3  /  DBili  x   /  AST  18  /  ALT  14  /  AlkPhos  72  [06-11-19 @ 07:04]    PT/INR: PT 11.3 , INR 0.98       [06-10-19 @ 21:51]  PTT: 28.5       [06-10-19 @ 21:51]      Creatinine Trend:  SCr 2.81 [06-11 @ 07:04]  SCr 2.79 [06-11 @ 07:01]  SCr 6.34 [06-11 @ 02:39]  SCr 5.65 [06-10 @ 21:51]  SCr 4.72 [06-03 @ 06:47]    Urinalysis - [06-04-17 @ 08:24]      Color Yellow / Appearance Clear / SG 1.013 / pH 8.5      Gluc 1000 / Ketone Negative  / Bili Negative / Urobili Negative       Blood Negative / Protein >600 / Leuk Est Small / Nitrite Negative      RBC 3-5 / WBC 6-10 / Hyaline  / Gran  / Sq Epi  / Non Sq Epi OCC / Bacteria       PTH -- (Ca 7.8)      [03-29-19 @ 20:38]   73  PTH -- (Ca 7.3)      [02-22-19 @ 02:49]   59  HbA1c 8.8      [04-30-19 @ 08:58]  TSH 0.71      [11-17-18 @ 08:25]    HBsAb <3.0      [06-02-19 @ 00:32]  HBsAb Nonreact      [03-26-19 @ 16:04]  HBsAg Nonreact      [06-02-19 @ 00:32]  HBcAb Nonreact      [03-26-19 @ 16:04]  HCV 0.12, Nonreact      [06-02-19 @ 00:32]

## 2019-06-11 NOTE — H&P ADULT - NSHPSOCIALHISTORY_GEN_ALL_CORE
Social History:    Marital Status:  (  x )    (   ) Single    (   )    (  )   Occupation: not working  Lives with: (  ) alone  (  ) children   (x  ) spouse   (  ) parents  (  ) other    Substance Use (street drugs): ( x ) never used  (  ) other:  Tobacco Usage:  (   ) never smoked   ( x  ) former smoker   (   ) current smoker  (     ) pack year  (        ) last cigarette date  Alcohol Usage: denies

## 2019-06-11 NOTE — H&P ADULT - PROBLEM SELECTOR PLAN 1
-FSG 500s, AG 18, bhb 0.9; no acidosis on vbg, bicarb 22; appears to have borderline or very early DKA but will likely improve with insulin she has received overnight  -c/w fsg qac/qhs  -c/w lantus 4 qhs, humalog 3 units premeal, HISS  -endo eval in am deferred to day attending to arrange  -trend bmp until gap closes; if widening may require insulin gtt

## 2019-06-11 NOTE — H&P ADULT - NSHPLABSRESULTS_GEN_ALL_CORE
Labs personally reviewed  no leukocytosis, chronic stable anemia, chronic stable macrocytosis, plt wnl, coags wnl, cmp c/w ESRD, hyperK 7.1, AG 18, bicarb 22, BHB 0.9, ,  --> 505 --> 542 --> 478, VBG pH 7.35, lactate 1.5.    Imaging personally reviewed CXR prelim no focal consolidations. CTH no acute bleed/infarct/shift/mass effect, +chronic lacunar infarcts and chronic microvascular changes.   EKG personally reviewed no peaked T

## 2019-06-11 NOTE — PHARMACOTHERAPY INTERVENTION NOTE - COMMENTS
Confirmed home medications with patient and pharmacy, updated in Outpatient Medication Review. Discrepancies communicated with NP - Patient has duloxetine on medication list but pharmacy has not filled since 2017, per patient likely not taking anymore. Patient also states that she takes hydralazine 25 mg - 4 tabs TID, sevelamer 1 tab TID, and furosemide 40 mg three times a week. Per patient/pharmacy also on atorvastatin 20 mg.    Jackie Contreras, PharmD   [phone]

## 2019-06-11 NOTE — H&P ADULT - HISTORY OF PRESENT ILLNESS
63 F  PMH CAD Sp PTCA w stents CHF,COPD, CVA, DMT1, ESRD on HD (M,Tu,Th,Sa), Gout, HLD, PVD, Sublcavian Stensosis (L) sp stent. PSH ASD Repair,idania,fem-pop bypass, recurrent admission for hyper/hypoglycemia/DKA, p/w cc of hyperglycemia as outpatient.  Pt states that she is unaware of her exact insulin regimen but knows she takes Lantus qhs and humalog premeal, often eating up to 4-5 meals a day; her  helps with insulin regimen, and is not at bedside.  Her last Lantus dose was the night preceding her presentation to ER. States on repeated FSG measurements during the day, her fsg kept increasing, first in 300s then reading "high." Pt last had HD on 6/10, lasting about 2.5 hours. Pt states her usual dry weight tyron bout 54-56 kg. She currently denies cp, +mild sob, denies f/c, n/v/d, dysuria (does not make urine at baseline).  +chronic L>RLE swelling for many years unchanged. Denies abd distention.  Pt seen in room with dialysis  and dialysis RN present.    Vs: 97.6, 85, 168/79, 18, 97% Ra. Labs: no leukocytosis, chronic stable anemia, chronic stable macrocytosis, plt wnl, coags wnl, cmp c/w ESRD, hyperK 7.1, AG 18, bicarb 22, BHB 0.9, ,  --> 505 --> 542 --> 478, VBG pH 7.35, lactate 1.5.  CXR prelim no focal consolidations. CTH no acute bleed/infarct/shift/mass effect, +chronic lacunar infarcts and chronic microvascular changes. IN ER, pt received, calcium gluconate x 2, albuterol nebs, insulin R 4u x 1, Insulin R 6u x 1, humalog 5u x 1, Lantus 4u x 1, 250 cc NS bolus x 1, prior to medicine team involvement. MICU and renal consulted; not candidate for ICU, plan for HD set to be done on tele floor overnight.

## 2019-06-11 NOTE — H&P ADULT - NSHPPHYSICALEXAM_GEN_ALL_CORE
PHYSICAL EXAM:  GENERAL: NAD, chronically ill appearing white female  HEAD:  Atraumatic, Normocephalic  EYES: EOMI, PERRLA, conjunctiva and sclera clear  ENMT: No tonsillar erythema, exudates, or enlargement; Moist mucous membranes, Good dentition, No lesions  NECK: Supple, No JVD, Normal thyroid  CHEST/LUNG: crackles b/l lower lung fields. no resp distress or accessory muscle usage.   HEART: Regular rate and rhythm; No murmurs, rubs, or gallops, 2+ b/l LE edema R leg on the whole appears more swollen then left.   ABDOMEN: Soft, Nontender, Nondistended; Bowel sounds present, no rebound/guarding  EXTREMITIES:  2+ Peripheral Pulses, No clubbing, cyanosis  LYMPH: No lymphadenopathy noted, no lymphangitis  SKIN: No rashes or lesions, no petechiae  NERVOUS SYSTEM:  Alert & Oriented X3, Good concentration; Motor Strength 5/5 B/L upper and lower extremities

## 2019-06-11 NOTE — ED ADULT NURSE NOTE - OBJECTIVE STATEMENT
· HPI Objective Statement: 64 yo female with uncontrolled type 1 diabetes (well known from multiple prior admissions) DM c/b ESRD on HD, neuropathy, CAD, CVA, CHF and multiple cardiac stents presents w/ hyperglycemia. Patient gets dialysis Tu/Thur/Sat, and extra mini-session on Mondays, had 2.5 L Taken off today.  checked BG after dialysis, was in 300's, and then read high after he gave night time Lantus. Patient on 2 Units Lantus qHS and 2 Humalog before all 3 meals. Patient currently c/o some L foot pain. Bethanie any f/c, N/V/D, abd pain. Makes small amount of urine. Patient arrived to ED with 18g peripheral IV placed by EMS. FSBS done - 446. Patient on CM - NSR in 80s.

## 2019-06-11 NOTE — H&P ADULT - NSHPREVIEWOFSYSTEMS_GEN_ALL_CORE
REVIEW OF SYSTEMS:  CONSTITUTIONAL: No weakness. No fevers. No chills. No weight loss. Good appetite.  EYES: No visual changes. No eye pain.  ENT: No hearing difficulty. No vertigo. No dysphagia. No Sinusitis/rhinorrhea.  NECK: No pain. No stiffness/rigidity.  CARDIAC: No chest pain. No palpitations.  RESPIRATORY: No cough. No SOB. No hemoptysis.  GASTROINTESTINAL: No abdominal pain. No nausea. No vomiting. No hematemesis. No diarrhea. No constipation. No melena. No hematochezia.  GENITOURINARY: No dysuria. No frequency. No hesitancy. No hematuria.  NEUROLOGICAL: No numbness. No focal weakness. No incontinence. No headache.  BACK: No back pain.  EXTREMITIES: No lower extremity edema. Full ROM.  SKIN: No rashes. No itching. No other lesions.  PSYCHIATRIC: No depression. No anxiety. No SI/HI.  ALLERGIC: No lip swelling. No hives.  All other review of systems is negative unless indicated above.  [  ] Unable to assess ROS because REVIEW OF SYSTEMS:  CONSTITUTIONAL: No weakness. No fevers. No chills. No weight loss. Good appetite.  EYES: No visual changes. No eye pain.  ENT: No hearing difficulty. No vertigo. No dysphagia. No Sinusitis/rhinorrhea.  NECK: No pain. No stiffness/rigidity.  CARDIAC: No chest pain. No palpitations.  RESPIRATORY: No cough. mild SOB. No hemoptysis.  GASTROINTESTINAL: No abdominal pain. No nausea. No vomiting. No hematemesis. No diarrhea. No constipation. No melena. No hematochezia.  GENITOURINARY: No dysuria. No frequency. No hesitancy. No hematuria.  NEUROLOGICAL: No numbness. No focal weakness. No incontinence. No headache.  BACK: No back pain.  EXTREMITIES: chronic unchanged lower extremity edema. Full ROM.  SKIN: No rashes. No itching. No other lesions.  PSYCHIATRIC: No depression. No anxiety. No SI/HI.  ALLERGIC: No lip swelling. No hives.  All other review of systems is negative unless indicated above.

## 2019-06-11 NOTE — CONSULT NOTE ADULT - PROBLEM SELECTOR RECOMMENDATION 2
- goal BP is less than 130/80, BP currently above goal  - c/w current antihypertensives and adjust as needed

## 2019-06-11 NOTE — H&P ADULT - ASSESSMENT
63 F  PMH CAD Sp PTCA w stents CHF,COPD, CVA, DMT1, ESRD on HD (M,Tu,Th,Sa), Gout, HLD, PVD, Sublcavian Stensosis (L) sp stent. PSH ASD Repair,idania,fem-pop bypass, recurrent admission for hyper/hypoglycemia/DKA, p/w cc of hyperglycemia as outpatient, found with hyperkalemia requiring urgent HD overnight.

## 2019-06-11 NOTE — H&P ADULT - PROBLEM SELECTOR PLAN 2
-HyperK to 7.1; EKG without peaked T  -s/p hyperK cocktail + calcium gluconate + insulin  -pt to be dialyzed overnight per Dr. Schmidt's recs  -stat repeat bmp pending HD   -c/w tele  -may require additional medical mgt of hyperK post HD if still elevated  -c/w home lasix

## 2019-06-11 NOTE — H&P ADULT - ATTENDING COMMENTS
Care to be assumed by Dr. Viera at 8 am.  This patient was assigned to me by the hospitalist in charge; my involvement in this case has consisted of the initial history, physical, chart review, and management plan.  This patient was previously unknown to me.  Case endorsed to NP ______ at ____.

## 2019-06-11 NOTE — H&P ADULT - PROBLEM SELECTOR PLAN 5
-HD overnight  -c/w medical mgt of hyperK if needed  -c/w sevelemer  -trend bmp  -renal diet  -f/u further recs from Dr. Schmidt

## 2019-06-11 NOTE — H&P ADULT - PROBLEM SELECTOR PLAN 7
-assymetmric RLE > LLE, reportedly unchanged per patient  -previous doppler 3 weeks ago  5/2019 negative for dvt b/l  -monitor for now

## 2019-06-11 NOTE — CHART NOTE - NSCHARTNOTEFT_GEN_A_CORE
EVENT NOTE  Patient with episode of PAT with HR up to 130s. Patient asymptomatic. Per RN, patient had 5 beats WCT day shift.  Seen at bedside. Denies complaints at this time. Ambulating in the room. Denied lightheadedness, dizziness, visual changes, SOB, CP, palpitations, n/v/abdominal pain     Past Medical, Past Surgical, and Family History:  PAST MEDICAL HISTORY:  ACS (acute coronary syndrome) 1/2015 - cath revealed 100% ostial stenosis not amenable to PCI - medical management  CAD (coronary artery disease) s/p stents  CHF (congestive heart failure) EF 40-45%  COPD (chronic obstructive pulmonary disease)   CVA (cerebral infarction) with no residual, 8 yrs ago, prior to heart surgery - ST memory loss  Diabetes mellitus type 1 Insulin Dependent -  DKA, type 1 1/2015  ESRD (end stage renal disease) dialysis  M, tue, thursday, saturday  Gout past  Hyperlipidemia   Localized enlarged lymph nodes   Peripheral vascular disease occluded left fem-pop bypass 5/2015  Subclavian vein stenosis, left s/p stent  TIA (transient ischemic attack) x 2 - 8-9 years ago prior to ASD/VSD repair.    PAST SURGICAL HISTORY:  AV (arteriovenous fistula) Left AV graft; revision with stent placement 2-3 years ago  History of cardiac catheterization 1/2015 - no intervention  S/P cholecystectomy   S/P femoral-popliteal bypass surgery L and R in 2013 with graft; 5/2015 CFA angioplasty left and ileofemoral endarterectomywith vein patch angioplasty of left fem-pop bypass graft  Multiple vascular surgery both leg, left fempop bypass revision 11/2015  Status post device closure of ASD "clamshell".     FAMILY HISTORY:  Family history of hypertension  Family history of smoking    ROS: as above      Vitals:T(F): 98.5 (06-11-19 @ 20:24)  HR: 80 (06-11-19 @ 20:24)  BP: 151/75 (06-11-19 @ 20:24)  RR: 18 (06-11-19 @ 20:24)  SpO2: 97% (06-11-19 @ 20:24)    PE:  GENERAL: awake, NAD, non-toxic appearance  RESPIRATORY: Clear to auscultation bilaterally; No rales, rhonchi, wheezing  CARDIOVASCULAR: Regular rate and rhythm, S1S2  GI: Soft, nontender, non distended, normal bowel sounds  Ext: + edema  Neuro: alert and oriented x2    A/P:  63 F  PMH CAD Sp PTCA w stents CHF,COPD, CVA, DMT1, ESRD on HD (M,Tu,Th,Sa), Gout, HLD, PVD, Sublcavian Stensosis (L) sp stent. PSH ASD Repair,idania,fem-pop bypass, recurrent admission for hyper/hypoglycemia/DKA, p/w cc of hyperglycemia as outpatient, found with hyperkalemia requiring urgent HD overnight now with episode of PAT.  - pt asymptomatic  - will repeat electrolytes now  - c/w metoprolol  - monitor VS  - c/w tele monitoring  - f/u with Attending    Anna Jimenez, NP  Medicine  [id number]

## 2019-06-11 NOTE — H&P ADULT - PROBLEM SELECTOR PLAN 3
-pt with crackles b/l lung fields, LE edema in setting of ESRD, severe AS  -c/w plans for HD for fluid removal  -c/w maintenance lasix, though it appears pt makes minimal urine.   -c/w lisionpril, toprol  -cards eval in am for severe AS/ADHF, sees Dr. Biswas

## 2019-06-11 NOTE — CONSULT NOTE ADULT - SUBJECTIVE AND OBJECTIVE BOX
CHIEF COMPLAINT: Hyperglycemia    HPI:  63F PMH T1DM (c/b ESRD on TTSM HD - oliguric, peripheral neuropathy), CAD (multiple stents), CVA, CHF (EF 30-35% on TTE 4/2/19), moderate-severe mitral regurgitation, and multiple hospitalizations for DKA who presents with chief complaint of hyperglycemia. She had HD today for 2.5 hrs, afterwards her  checked her FS which was >300, he gave insulin but her FS continued to climb. Patient is unclear on the exact insulin regimen that she is using as her  generally manages her sugars, but she believes that she has been taking 4U Lantus QHS and 3U humalog pre-meal, in context she eats "many meals per day" and estimates that she takes humalog >4-5x per day. She denies any CP, palpitations, dizziness, LE swelling, N/V, diarrhea, abdominal pain, SOB, orthopnea.         PAST MEDICAL & SURGICAL HISTORY:  COPD (chronic obstructive pulmonary disease)  Localized enlarged lymph nodes  CHF (congestive heart failure): EF 40-45%  Subclavian vein stenosis, left: s/p stent  DKA, type 1: 1/2015  ACS (acute coronary syndrome): 1/2015 - cath revealed 100% ostial stenosis not amenable to PCI - medical management  TIA (transient ischemic attack): x 2 - 8-9 years ago prior to ASD/VSD repair  CAD (coronary artery disease): s/p stents  Gout: past  CVA (cerebral infarction): with no residual, 8 yrs ago, prior to heart surgery - ST memory loss  Peripheral vascular disease: occluded left fem-pop bypass 5/2015  Diabetes mellitus type 1: Insulin Dependent -  ESRD (end stage renal disease): dialysis  M, tue, thursday, saturday  Hyperlipidemia  Status post device closure of ASD: &quot;clamshell&quot;  History of cardiac catheterization: 1/2015 - no intervention  S/P femoral-popliteal bypass surgery: L and R in 2013 with graft; 5/2015 CFA angioplasty left and ileofemoral endarterectomywith vein patch angioplasty of left fem-pop bypass graft  Multiple vascular surgery both leg, left fempop bypass revision 11/2015  AV (arteriovenous fistula): Left AV graft; revision with stent placement 2-3 years ago  S/P cholecystectomy      FAMILY HISTORY:  Family history of smoking  Family history of hypertension  Family history of cancer (Sibling)      SOCIAL HISTORY:  Smoking: [ ] Never Smoked [ ] Former Smoker (__ packs x ___ years) [ ] Current Smoker  (__ packs x ___ years)  Substance Use: [ ] Never Used [ ] Used ____  EtOH Use:  Marital Status: [ ] Single [ ]  [ ]  [ ]   Sexual History:   Occupation:  Recent Travel:  Country of Birth:  Advance Directives:    Allergies    No Known Allergies    Intolerances        HOME MEDICATIONS:    REVIEW OF SYSTEMS:  Constitutional: [ ] negative [ ] fevers [ ] chills [ ] weight loss [ ] weight gain  HEENT: [ ] negative [ ] dry eyes [ ] eye irritation [ ] postnasal drip [ ] nasal congestion  CV: [ ] negative  [ ] chest pain [ ] orthopnea [ ] palpitations [ ] murmur  Resp: [ ] negative [ ] cough [ ] shortness of breath [ ] dyspnea [ ] wheezing [ ] sputum [ ] hemoptysis  GI: [ ] negative [ ] nausea [ ] vomiting [ ] diarrhea [ ] constipation [ ] abd pain [ ] dysphagia   : [ ] negative [ ] dysuria [ ] nocturia [ ] hematuria [ ] increased urinary frequency  Musculoskeletal: [ ] negative [ ] back pain [ ] myalgias [ ] arthralgias [ ] fracture  Skin: [ ] negative [ ] rash [ ] itch  Neurological: [ ] negative [ ] headache [ ] dizziness [ ] syncope [ ] weakness [ ] numbness  Psychiatric: [ ] negative [ ] anxiety [ ] depression  Endocrine: [ ] negative [ ] diabetes [ ] thyroid problem  Hematologic/Lymphatic: [ ] negative [ ] anemia [ ] bleeding problem  Allergic/Immunologic: [ ] negative [ ] itchy eyes [ ] nasal discharge [ ] hives [ ] angioedema  [ ] All other systems negative  [ ] Unable to assess ROS because ________    OBJECTIVE:  ICU Vital Signs Last 24 Hrs  T(C): 36.6 (10 Christian 2019 21:29), Max: 37.1 (10 Christian 2019 21:10)  T(F): 97.9 (10 Christian 2019 21:29), Max: 98.7 (10 Christian 2019 21:10)  HR: 72 (10 Christian 2019 21:29) (72 - 72)  BP: 157/72 (10 Christian 2019 21:29) (153/74 - 157/72)  BP(mean): --  ABP: --  ABP(mean): --  RR: 18 (10 Christian 2019 21:29) (18 - 18)  SpO2: 97% (10 Christian 2019 21:29) (97% - 100%)        CAPILLARY BLOOD GLUCOSE      POCT Blood Glucose.: 542 mg/dL (10 Christian 2019 23:45)      PHYSICAL EXAM:  General:   HEENT:   Lymph Nodes:  Neck:   Respiratory:   Cardiovascular:   Abdomen:   Extremities:   Skin:   Neurological:  Psychiatry:    LINES:     HOSPITAL MEDICATIONS:                              LABS:                        9.5    3.8   )-----------( 182      ( 10 Christian 2019 21:51 )             28.0     Hgb Trend: 9.5<--  06-10    137  |  97  |  64<H>  ----------------------------<  434<H>  7.1<HH>   |  22  |  5.65<H>    Ca    8.9      10 Christian 2019 21:51    TPro  6.0  /  Alb  3.4  /  TBili  0.2  /  DBili  x   /  AST  15  /  ALT  14  /  AlkPhos  65  06-10    Creatinine Trend: 5.65<--, 4.72<--, 4.93<--, 5.93<--, 4.27<--, 2.63<--  PT/INR - ( 10 Christian 2019 21:51 )   PT: 11.3 sec;   INR: 0.98 ratio         PTT - ( 10 Christian 2019 21:51 )  PTT:28.5 sec      Venous Blood Gas:  06-10 @ 21:51  7.35/50/33/27/53  VBG Lactate: 1.5      MICROBIOLOGY:     RADIOLOGY:  [ ] Reviewed and interpreted by me    EKG: CHIEF COMPLAINT: Hyperglycemia    HPI:  63F PMH T1DM (c/b ESRD on TTSM HD - oliguric, peripheral neuropathy), CAD (multiple stents), CVA, CHF (EF 30-35% on TTE 4/2/19), moderate-severe mitral regurgitation, and multiple hospitalizations for DKA who presents with chief complaint of hyperglycemia. She had HD today for 2.5 hrs, afterwards her  checked her FS which was >300, he gave insulin but her FS continued to climb. Patient is unclear on the exact insulin regimen that she is using as her  generally manages her sugars, but she believes that she has been taking 4U Lantus QHS and 3U humalog pre-meal, in context she eats "many meals per day" and estimates that she takes humalog >4-5x per day. She denies any CP, palpitations, dizziness, LE swelling, N/V, diarrhea, abdominal pain, SOB, orthopnea.   She presented to the ED with concern for her increasing hyperglycemia.   In the ED, her VS wnl, initial . Initial labs demonstrated stable CBC, K 7.1, , AG 18, B-hB 0.9, HCO3 22, VBG pH 7.35.   CXR clear and CT head unremarkable. EKG demonstrated no changes or peaked T-waves.  Patient was given 250 cc IV NS, Insulin 6U, Ca Gluconate, and albuterol.   FS increased to 505 prior to regular insulin 6U. Repeat FS <1 hr later increased to 542. Patient given additional 4U insulin.    PAST MEDICAL & SURGICAL HISTORY:  COPD (chronic obstructive pulmonary disease)  Localized enlarged lymph nodes  CHF (congestive heart failure): EF 40-45%  Subclavian vein stenosis, left: s/p stent  DKA, type 1: 1/2015  ACS (acute coronary syndrome): 1/2015 - cath revealed 100% ostial stenosis not amenable to PCI - medical management  TIA (transient ischemic attack): x 2 - 8-9 years ago prior to ASD/VSD repair  CAD (coronary artery disease): s/p stents  Gout: past  CVA (cerebral infarction): with no residual, 8 yrs ago, prior to heart surgery - ST memory loss  Peripheral vascular disease: occluded left fem-pop bypass 5/2015  Diabetes mellitus type 1: Insulin Dependent -  ESRD (end stage renal disease): dialysis  M, tue, thursday, saturday  Hyperlipidemia  Status post device closure of ASD: &quot;clamshell&quot;  History of cardiac catheterization: 1/2015 - no intervention  S/P femoral-popliteal bypass surgery: L and R in 2013 with graft; 5/2015 CFA angioplasty left and ileofemoral endarterectomywith vein patch angioplasty of left fem-pop bypass graft  Multiple vascular surgery both leg, left fempop bypass revision 11/2015  AV (arteriovenous fistula): Left AV graft; revision with stent placement 2-3 years ago  S/P cholecystectomy      FAMILY HISTORY:  Family history of smoking  Family history of hypertension  Family history of cancer (Sibling)      SOCIAL HISTORY:  Smoking: [ ] Never Smoked [ ] Former Smoker (__ packs x ___ years) [ ] Current Smoker  (__ packs x ___ years)  Substance Use: [ ] Never Used [ ] Used ____  EtOH Use:  Marital Status: [ ] Single [ ]  [ ]  [ ]   Sexual History:   Occupation:  Recent Travel:  Country of Birth:  Advance Directives:    Allergies    No Known Allergies    Intolerances        HOME MEDICATIONS:    REVIEW OF SYSTEMS:  Constitutional: [ ] negative [ ] fevers [ ] chills [ ] weight loss [ ] weight gain  HEENT: [ ] negative [ ] dry eyes [ ] eye irritation [ ] postnasal drip [ ] nasal congestion  CV: [ ] negative  [ ] chest pain [ ] orthopnea [ ] palpitations [ ] murmur  Resp: [ ] negative [ ] cough [ ] shortness of breath [ ] dyspnea [ ] wheezing [ ] sputum [ ] hemoptysis  GI: [ ] negative [ ] nausea [ ] vomiting [ ] diarrhea [ ] constipation [ ] abd pain [ ] dysphagia   : [ ] negative [ ] dysuria [ ] nocturia [ ] hematuria [ ] increased urinary frequency  Musculoskeletal: [ ] negative [ ] back pain [ ] myalgias [ ] arthralgias [ ] fracture  Skin: [ ] negative [ ] rash [ ] itch  Neurological: [ ] negative [ ] headache [ ] dizziness [ ] syncope [ ] weakness [ ] numbness  Psychiatric: [ ] negative [ ] anxiety [ ] depression  Endocrine: [ ] negative [ ] diabetes [ ] thyroid problem  Hematologic/Lymphatic: [ ] negative [ ] anemia [ ] bleeding problem  Allergic/Immunologic: [ ] negative [ ] itchy eyes [ ] nasal discharge [ ] hives [ ] angioedema  [ ] All other systems negative  [ ] Unable to assess ROS because ________    OBJECTIVE:  ICU Vital Signs Last 24 Hrs  T(C): 36.6 (10 Christian 2019 21:29), Max: 37.1 (10 Christian 2019 21:10)  T(F): 97.9 (10 Christian 2019 21:29), Max: 98.7 (10 Christian 2019 21:10)  HR: 72 (10 Christian 2019 21:29) (72 - 72)  BP: 157/72 (10 Christian 2019 21:29) (153/74 - 157/72)  BP(mean): --  ABP: --  ABP(mean): --  RR: 18 (10 Christian 2019 21:29) (18 - 18)  SpO2: 97% (10 Christian 2019 21:29) (97% - 100%)        CAPILLARY BLOOD GLUCOSE      POCT Blood Glucose.: 542 mg/dL (10 Christian 2019 23:45)      PHYSICAL EXAM:  General:   HEENT:   Lymph Nodes:  Neck:   Respiratory:   Cardiovascular:   Abdomen:   Extremities:   Skin:   Neurological:  Psychiatry:    LINES:     HOSPITAL MEDICATIONS:                              LABS:                        9.5    3.8   )-----------( 182      ( 10 Christian 2019 21:51 )             28.0     Hgb Trend: 9.5<--  06-10    137  |  97  |  64<H>  ----------------------------<  434<H>  7.1<HH>   |  22  |  5.65<H>    Ca    8.9      10 Christian 2019 21:51    TPro  6.0  /  Alb  3.4  /  TBili  0.2  /  DBili  x   /  AST  15  /  ALT  14  /  AlkPhos  65  06-10    Creatinine Trend: 5.65<--, 4.72<--, 4.93<--, 5.93<--, 4.27<--, 2.63<--  PT/INR - ( 10 Christian 2019 21:51 )   PT: 11.3 sec;   INR: 0.98 ratio         PTT - ( 10 Christian 2019 21:51 )  PTT:28.5 sec      Venous Blood Gas:  06-10 @ 21:51  7.35/50/33/27/53  VBG Lactate: 1.5      MICROBIOLOGY:     RADIOLOGY:  [ ] Reviewed and interpreted by me    EKG: CHIEF COMPLAINT: Hyperglycemia    HPI:  63F PMH T1DM (c/b ESRD on TTSM HD - oliguric, peripheral neuropathy), CAD (multiple stents), CVA, CHF (EF 30-35% on TTE 4/2/19), moderate-severe mitral regurgitation, and multiple hospitalizations for DKA who presents with chief complaint of hyperglycemia. She had HD today for 2.5 hrs, afterwards her  checked her FS which was >300, he gave insulin but her FS continued to climb. Patient is unclear on the exact insulin regimen that she is using as her  generally manages her sugars, but she believes that she has been taking 4U Lantus QHS and 3U humalog pre-meal, in context she eats "many meals per day" and estimates that she takes humalog >4-5x per day. She denies any CP, palpitations, dizziness, LE swelling, N/V, diarrhea, abdominal pain, SOB, orthopnea.   She presented to the ED with concern for her increasing hyperglycemia.   In the ED, her VS wnl, initial . Initial labs demonstrated stable CBC, K 7.1, , AG 18, B-hB 0.9, HCO3 22, VBG pH 7.35.   CXR clear and CT head unremarkable. EKG demonstrated no changes or peaked T-waves.  Patient was given 250 cc IV NS, Insulin 6U, Ca Gluconate, and albuterol.   FS increased to 505 prior to regular insulin 6U. Repeat FS <1 hr later increased to 542. Patient given additional 4U insulin.    PAST MEDICAL & SURGICAL HISTORY:  COPD (chronic obstructive pulmonary disease)  Localized enlarged lymph nodes  CHF (congestive heart failure): EF 40-45%  Subclavian vein stenosis, left: s/p stent  DKA, type 1: 1/2015  ACS (acute coronary syndrome): 1/2015 - cath revealed 100% ostial stenosis not amenable to PCI - medical management  TIA (transient ischemic attack): x 2 - 8-9 years ago prior to ASD/VSD repair  CAD (coronary artery disease): s/p stents  Gout: past  CVA (cerebral infarction): with no residual, 8 yrs ago, prior to heart surgery - ST memory loss  Peripheral vascular disease: occluded left fem-pop bypass 5/2015  Diabetes mellitus type 1: Insulin Dependent -  ESRD (end stage renal disease): dialysis  M, tue, thursday, saturday  Hyperlipidemia  Status post device closure of ASD: &quot;clamshell&quot;  History of cardiac catheterization: 1/2015 - no intervention  S/P femoral-popliteal bypass surgery: L and R in 2013 with graft; 5/2015 CFA angioplasty left and ileofemoral endarterectomywith vein patch angioplasty of left fem-pop bypass graft  Multiple vascular surgery both leg, left fempop bypass revision 11/2015  AV (arteriovenous fistula): Left AV graft; revision with stent placement 2-3 years ago  S/P cholecystectomy      FAMILY HISTORY:  Family history of smoking  Family history of hypertension  Family history of cancer (Sibling)      SOCIAL HISTORY:  Smoking: [x] Never Smoked [ ] Former Smoker (__ packs x ___ years) [ ] Current Smoker  (__ packs x ___ years)  Substance Use: [x] Never Used [ ] Used ____  EtOH Use: No  Marital Status: [ ] Single [x]  [ ]  [ ]       Allergies    No Known Allergies    Intolerances        HOME MEDICATIONS:    REVIEW OF SYSTEMS:  Constitutional: [x] negative [ ] fevers [ ] chills [ ] weight loss [ ] weight gain  HEENT: [x] negative [ ] dry eyes [ ] eye irritation [ ] postnasal drip [ ] nasal congestion  CV: [x] negative  [ ] chest pain [ ] orthopnea [ ] palpitations [ ] murmur  Resp: [x] negative [ ] cough [ ] shortness of breath [ ] dyspnea [ ] wheezing [ ] sputum [ ] hemoptysis  GI: [x] negative [ ] nausea [ ] vomiting [ ] diarrhea [ ] constipation [ ] abd pain [ ] dysphagia   : [x] negative [ ] dysuria [ ] nocturia [ ] hematuria [ ] increased urinary frequency  Musculoskeletal: [x] negative [ ] back pain [ ] myalgias [ ] arthralgias [ ] fracture  Skin: [x] negative [ ] rash [ ] itch  Neurological: [x] negative [ ] headache [ ] dizziness [ ] syncope [ ] weakness [ ] numbness  Endocrine: [ ] negative [x] diabetes [ ] thyroid problem  Hematologic/Lymphatic: [x] negative [ ] anemia [ ] bleeding problem  Allergic/Immunologic: [x] negative [ ] itchy eyes [ ] nasal discharge [ ] hives [ ] angioedema  [ ] All other systems negative  [ ] Unable to assess ROS because ________    OBJECTIVE:  ICU Vital Signs Last 24 Hrs  T(C): 36.6 (10 Christian 2019 21:29), Max: 37.1 (10 Christian 2019 21:10)  T(F): 97.9 (10 Christian 2019 21:29), Max: 98.7 (10 Christian 2019 21:10)  HR: 72 (10 Christian 2019 21:29) (72 - 72)  BP: 157/72 (10 Christian 2019 21:29) (153/74 - 157/72)  BP(mean): --  ABP: --  ABP(mean): --  RR: 18 (10 Christian 2019 21:29) (18 - 18)  SpO2: 97% (10 Christian 2019 21:29) (97% - 100%)        CAPILLARY BLOOD GLUCOSE      POCT Blood Glucose.: 542 mg/dL (10 Christian 2019 23:45)      PHYSICAL EXAM:  General: No acute distress, A&Ox3  HEENT: no redness or scleral icterus  Neck: supple, no jvd  Respiratory: no accessory muscle use, CTAB  Cardiovascular: RRR, S1/S2  Abdomen: Nontender, BS+  Extremities: No joint swelling, calor, or deformities. No calf swelling or tenderness. No C-Spine tenderness. LUE Fistula w/ thrill  Skin: warm, no rash on exposed skin  Neurological: Strength appropriate for age and habitus. Full active range of motion of all 4 extremities.       LINES:     HOSPITAL MEDICATIONS:                              LABS:                        9.5    3.8   )-----------( 182      ( 10 Christian 2019 21:51 )             28.0     Hgb Trend: 9.5<--  06-10    137  |  97  |  64<H>  ----------------------------<  434<H>  7.1<HH>   |  22  |  5.65<H>    Ca    8.9      10 Christian 2019 21:51    TPro  6.0  /  Alb  3.4  /  TBili  0.2  /  DBili  x   /  AST  15  /  ALT  14  /  AlkPhos  65  06-10    Creatinine Trend: 5.65<--, 4.72<--, 4.93<--, 5.93<--, 4.27<--, 2.63<--  PT/INR - ( 10 Christian 2019 21:51 )   PT: 11.3 sec;   INR: 0.98 ratio         PTT - ( 10 Christian 2019 21:51 )  PTT:28.5 sec      Venous Blood Gas:  06-10 @ 21:51  7.35/50/33/27/53  VBG Lactate: 1.5      MICROBIOLOGY:     RADIOLOGY:  [x] Reviewed and interpreted by me

## 2019-06-11 NOTE — ED ADULT NURSE NOTE - NSIMPLEMENTINTERV_GEN_ALL_ED
Implemented All Universal Safety Interventions:  [place] to call system. Call bell, personal items and telephone within reach. Instruct patient to call for assistance. Room bathroom lighting operational. Non-slip footwear when patient is off stretcher. Physically safe environment: no spills, clutter or unnecessary equipment. Stretcher in lowest position, wheels locked, appropriate side rails in place.

## 2019-06-11 NOTE — CONSULT NOTE ADULT - PROBLEM SELECTOR RECOMMENDATION 9
- patient with burgeoning DKA on admission - noted to have AG of 18 with BHB of 0.9 and bicarb of 18  - BG now improved on basal bolus insulin  - would c/w Humalog 3 units TID  - c/w Lantus 4 units qhs  - c/w low correction scale qac and qhs  - consistent carb diet  - will follow  - d/c on basal bolus insulin with outpatient follow up with Dr. Pina Ley

## 2019-06-11 NOTE — CONSULT NOTE ADULT - ASSESSMENT
63 year old woman with uncontrolled DM1, HTN, HLD, CAD, PVD, ESRD on HD here with hyperglycemia in setting of uncontrolled DM1
63 F  PMH CAD Sp PTCA w stents CHF,COPD, CVA, DMT1, ESRD on HD (M,Tu,Th,Sa), Gout, HLD, PVD, Sublcavian Stensosis (L) sp stent. PSH ASD Repair,idania,fem-pop bypass, recurrent admission for hyper/hypoglycemia/DKA, p/w cc of hyperglycemia  and hyperkalemia along with chf      1- hyperkalemia  2- hyperglycemia  3- htn   4- chf   5- shpt      urgent hd for fluid removal and for hyperkalemia  RN called  cont lisinopril 40 mg daily   cont hyrdalazine as well as above  renvela 3 tab with meals  low sodium and low k diet d/w pt   d/w admitting team when seen
63F PMH T1DM (c/b ESRD on TTSM HD - oliguric, peripheral neuropathy), CAD (multiple stents), CVA, CHF (EF 30-35% on TTE 4/2/19), moderate-severe mitral regurgitation, and multiple hospitalizations for DKA who presents with hyperglycemia s/p HD and hyperkalemia to K 7.1    # Hyperkalemia:  - Agree with insulin administration for hyperglycemia control and to improve hyperkalemia  - Calcium gluconate given  - Agree w/albuterol for temporizing measure  - Consult Nephrology, has seen Daisy Schmidt prior admission, patient will likely need HD tonight despite insulin and glucose control  - Not a MICU candidate at this time. Advise HD tonight under telemetry.  - Please reconsult as indicated.

## 2019-06-11 NOTE — CONSULT NOTE ADULT - SUBJECTIVE AND OBJECTIVE BOX
HPI:  63 year old woman with PMH uncontrolled DM1 no longer on insulin pump, HTN, HLD, CAD s/p PCI, CHF, COPD, CVA, ESRD on HD (M,Tu,Th,Sa), Gout, PVD, Sublcavian Stensosis (L) s/p stent, here with hyperglycemia from home. Patient is well known to our service as she has had multiple admission for DKA/hyperglycemia. She was previously on an insulin pump but this was discontinued by our team due to her frequent mismanagement of her insulin pump. She is now on basal bolus insulin. Her endocrinologist is Dr. Pina Ley, unclear when her last visit with Dr. Ley was. Patient states that she takes Levemir 3-4 units in the evening and Humalog 3 units before meals. She states that she did take Levemir 3 units on Sunday evening. On Monday, she became hyperglycemic and she states that she "messed up" and took her Humalog twice, unclear how much she took. Unclear what she ate as well. She states that she eats a lot and usually eats 4-5 times a day. Of note, on my exam, she is very forgetful. She lives with her  at home. She has known retinopathy, painful neuropathy in the left foot, and retinopathy as well. She has blurry vision, no polyuria or polydipsia.     PAST MEDICAL & SURGICAL HISTORY:  COPD (chronic obstructive pulmonary disease)  Localized enlarged lymph nodes  CHF (congestive heart failure): EF 40-45%  Subclavian vein stenosis, left: s/p stent  DKA, type 1: 1/2015  ACS (acute coronary syndrome): 1/2015 - cath revealed 100% ostial stenosis not amenable to PCI - medical management  TIA (transient ischemic attack): x 2 - 8-9 years ago prior to ASD/VSD repair  CAD (coronary artery disease): s/p stents  Gout: past  CVA (cerebral infarction): with no residual, 8 yrs ago, prior to heart surgery - ST memory loss  Peripheral vascular disease: occluded left fem-pop bypass 5/2015  Diabetes mellitus type 1: Insulin Dependent -  ESRD (end stage renal disease): dialysis  M, tue, thursday, saturday  Hyperlipidemia  Status post device closure of ASD: &quot;clamshell&quot;  History of cardiac catheterization: 1/2015 - no intervention  S/P femoral-popliteal bypass surgery: L and R in 2013 with graft; 5/2015 CFA angioplasty left and ileofemoral endarterectomywith vein patch angioplasty of left fem-pop bypass graft  Multiple vascular surgery both leg, left fempop bypass revision 11/2015  AV (arteriovenous fistula): Left AV graft; revision with stent placement 2-3 years ago  S/P cholecystectomy      FAMILY HISTORY:  Family history of smoking  Family history of hypertension  Family history of cancer (Sibling)      Social History:  Lives with , no current cigarette or alcohol use. Former smoker.    Outpatient Medications:  · 	furosemide 20 mg oral tablet: Last Dose Taken:  , 1 tab(s) orally 3 times a week   · 	sevelamer carbonate 800 mg oral tablet: Last Dose Taken:  , 2 tab(s) orally 3 times a day (with meals)  · 	DULoxetine 60 mg oral delayed release capsule: Last Dose Taken:  , 1 cap(s) orally once a day  · 	metoprolol succinate 50 mg oral tablet, extended release: Last Dose Taken:  , 1 tab(s) orally once a day  · 	clopidogrel 75 mg oral tablet: Last Dose Taken:  , 1 tab(s) orally once a day *see internal meek*  · 	aspirin 81 mg oral delayed release tablet: Last Dose Taken:  , 1 tab(s) orally once a day  · 	hydrALAZINE 25 mg oral tablet: Last Dose Taken:  , 1 tab(s) orally 3 times a day  · 	Percocet 5/325 oral tablet: Last Dose Taken:  , 2 tab(s) orally once a day (at bedtime), As Needed  · 	lisinopril 40 mg oral tablet: Last Dose Taken:  , 1 tab(s) orally once a day  · 	Multiple Vitamins oral tablet: Last Dose Taken:  , 1 tab(s) orally once a day  	Sundown MVI  · 	insulin detemir 100 units/mL subcutaneous solution: Last Dose Taken:  , 4 unit(s) subcutaneous once a day (at bedtime)    MEDICATIONS  (STANDING):  aspirin enteric coated 81 milliGRAM(s) Oral daily  clopidogrel Tablet 75 milliGRAM(s) Oral daily  dextrose 5%. 1000 milliLiter(s) (50 mL/Hr) IV Continuous <Continuous>  dextrose 50% Injectable 12.5 Gram(s) IV Push once  dextrose 50% Injectable 25 Gram(s) IV Push once  dextrose 50% Injectable 25 Gram(s) IV Push once  DULoxetine 60 milliGRAM(s) Oral daily  furosemide    Tablet 20 milliGRAM(s) Oral <User Schedule>  heparin  Injectable 5000 Unit(s) SubCutaneous every 8 hours  hydrALAZINE 25 milliGRAM(s) Oral three times a day  insulin glargine Injectable (LANTUS) 4 Unit(s) SubCutaneous at bedtime  insulin lispro (HumaLOG) corrective regimen sliding scale   SubCutaneous three times a day before meals  insulin lispro (HumaLOG) corrective regimen sliding scale   SubCutaneous at bedtime  insulin lispro Injectable (HumaLOG) 3 Unit(s) SubCutaneous three times a day before meals  lisinopril 40 milliGRAM(s) Oral daily  metoprolol succinate ER 50 milliGRAM(s) Oral daily  multivitamin 1 Tablet(s) Oral daily  sevelamer carbonate 1600 milliGRAM(s) Oral three times a day with meals    MEDICATIONS  (PRN):  dextrose 40% Gel 15 Gram(s) Oral once PRN Blood Glucose LESS THAN 70 milliGRAM(s)/deciliter  glucagon  Injectable 1 milliGRAM(s) IntraMuscular once PRN Glucose LESS THAN 70 milligrams/deciliter  oxyCODONE    5 mG/acetaminophen 325 mG 1 Tablet(s) Oral every 6 hours PRN moderate or severe pain      Allergies  No Known Allergies    Review of Systems:  Constitutional: No fever  Eyes: + blurry vision  Neuro: No tremors  HEENT: No pain  Cardiovascular: No chest pain, palpitations  Respiratory: No SOB, no cough  GI: No nausea, vomiting, abdominal pain  : No dysuria  Skin: no rash  Endocrine: no polyuria, polydipsia    ALL OTHER SYSTEMS REVIEWED AND NEGATIVE    PHYSICAL EXAM:  VITALS: T(C): 36.3 (06-11-19 @ 12:12)  T(F): 97.3 (06-11-19 @ 12:12), Max: 98.7 (06-10-19 @ 21:10)  HR: 105 (06-11-19 @ 12:12) (71 - 105)  BP: 144/79 (06-11-19 @ 12:12) (137/57 - 168/79)  RR:  (17 - 18)  SpO2:  (95% - 100%)  Wt(kg): --  GENERAL: NAD, well-developed  EYES: No proptosis, anicteric  HEENT:  Atraumatic, Normocephalic,  THYROID: Normal size, no palpable nodules  RESPIRATORY: Clear to auscultation bilaterally; No rales, rhonchi, wheezing  CARDIOVASCULAR: Regular rate and rhythm; No murmurs; 2+ pitting edema left lower extremity   GI: Soft, nontender, non distended, normal bowel sounds  SKIN: Dry, intact, No ulcers or wounds on feet  MUSCULOSKELETAL: Full range of motion, normal strength  PSYCH: Alert and oriented x 2, reactive affect    POCT Blood Glucose.: 194 mg/dL (06-11-19 @ 11:38) H 3, H 1  POCT Blood Glucose.: 217 mg/dL (06-11-19 @ 08:10) H 3, H 2  POCT Blood Glucose.: 293 mg/dL (06-11-19 @ 03:35)  POCT Blood Glucose.: 422 mg/dL (06-11-19 @ 01:15)  POCT Blood Glucose.: 459 mg/dL (06-11-19 @ 01:14) L 4  POCT Blood Glucose.: 478 mg/dL (06-11-19 @ 00:37) H 5  POCT Blood Glucose.: 542 mg/dL (06-10-19 @ 23:45) R 4 IV  POCT Blood Glucose.: 505 mg/dL (06-10-19 @ 22:43) R 6 IV  POCT Blood Glucose.: 446 mg/dL (06-10-19 @ 21:18)                          9.5    3.48  )-----------( 199      ( 11 Jun 2019 08:31 )             29.6       06-11    135  |  91<L>  |  23  ----------------------------<  208<H>  3.9   |  25  |  2.81<H>    EGFR if : 20<L>  EGFR if non : 17<L>    Ca    9.1      06-11  Mg     2.0     06-11  Phos  3.3     06-11    TPro  6.8  /  Alb  3.9  /  TBili  0.3  /  DBili  x   /  AST  18  /  ALT  14  /  AlkPhos  72  06-11      Hemoglobin A1C, Whole Blood: 8.8 % <H> [4.0 - 5.6] (04-30-19 @ 08:58) HPI: 63 year old woman with PMH uncontrolled DM1 no longer on insulin pump, HTN, HLD, CAD s/p PCI, CHF, COPD, CVA, ESRD on HD (M,Tu,Th,Sa), Gout, PVD, Sublcavian Stensosis (L) s/p stent, here with hyperglycemia from home. Patient is well known to our service as she has had multiple admission for DKA/hyperglycemia. She was previously on an insulin pump but this was discontinued by our team due to her frequent mismanagement of her insulin pump. She is now on basal bolus insulin. Her endocrinologist is Dr. Pina Ley, unclear when her last visit with Dr. Ley was. Patient states that she takes Levemir 3-4 units in the evening and Humalog 3 units before meals. She states that she did take Levemir 3 units on Sunday evening. On Monday, she became hyperglycemic and she states that she "messed up" and took her Humalog twice, unclear how much she took. Unclear what she ate as well. She states that she eats a lot and usually eats 4-5 times a day. Of note, on my exam, she is very forgetful. She lives with her  at home. She has known retinopathy, painful neuropathy in the left foot, and retinopathy as well. She has blurry vision, no polyuria or polydipsia.     PAST MEDICAL & SURGICAL HISTORY:  COPD (chronic obstructive pulmonary disease)  Localized enlarged lymph nodes  CHF (congestive heart failure): EF 40-45%  Subclavian vein stenosis, left: s/p stent  DKA, type 1: 1/2015  ACS (acute coronary syndrome): 1/2015 - cath revealed 100% ostial stenosis not amenable to PCI - medical management  TIA (transient ischemic attack): x 2 - 8-9 years ago prior to ASD/VSD repair  CAD (coronary artery disease): s/p stents  Gout: past  CVA (cerebral infarction): with no residual, 8 yrs ago, prior to heart surgery - ST memory loss  Peripheral vascular disease: occluded left fem-pop bypass 5/2015  Diabetes mellitus type 1: Insulin Dependent -  ESRD (end stage renal disease): dialysis  M, tue, thursday, saturday  Hyperlipidemia  Status post device closure of ASD: &quot;clamshell&quot;  History of cardiac catheterization: 1/2015 - no intervention  S/P femoral-popliteal bypass surgery: L and R in 2013 with graft; 5/2015 CFA angioplasty left and ileofemoral endarterectomywith vein patch angioplasty of left fem-pop bypass graft  Multiple vascular surgery both leg, left fempop bypass revision 11/2015  AV (arteriovenous fistula): Left AV graft; revision with stent placement 2-3 years ago  S/P cholecystectomy      FAMILY HISTORY:  Family history of smoking  Family history of hypertension  Family history of cancer (Sibling)      Social History:  Lives with , no current cigarette or alcohol use. Former smoker.    Outpatient Medications:  · 	furosemide 20 mg oral tablet: Last Dose Taken:  , 1 tab(s) orally 3 times a week   · 	sevelamer carbonate 800 mg oral tablet: Last Dose Taken:  , 2 tab(s) orally 3 times a day (with meals)  · 	DULoxetine 60 mg oral delayed release capsule: Last Dose Taken:  , 1 cap(s) orally once a day  · 	metoprolol succinate 50 mg oral tablet, extended release: Last Dose Taken:  , 1 tab(s) orally once a day  · 	clopidogrel 75 mg oral tablet: Last Dose Taken:  , 1 tab(s) orally once a day *see internal meek*  · 	aspirin 81 mg oral delayed release tablet: Last Dose Taken:  , 1 tab(s) orally once a day  · 	hydrALAZINE 25 mg oral tablet: Last Dose Taken:  , 1 tab(s) orally 3 times a day  · 	Percocet 5/325 oral tablet: Last Dose Taken:  , 2 tab(s) orally once a day (at bedtime), As Needed  · 	lisinopril 40 mg oral tablet: Last Dose Taken:  , 1 tab(s) orally once a day  · 	Multiple Vitamins oral tablet: Last Dose Taken:  , 1 tab(s) orally once a day  	Sundown MVI  · 	insulin detemir 100 units/mL subcutaneous solution: Last Dose Taken:  , 4 unit(s) subcutaneous once a day (at bedtime)    MEDICATIONS  (STANDING):  aspirin enteric coated 81 milliGRAM(s) Oral daily  clopidogrel Tablet 75 milliGRAM(s) Oral daily  dextrose 5%. 1000 milliLiter(s) (50 mL/Hr) IV Continuous <Continuous>  dextrose 50% Injectable 12.5 Gram(s) IV Push once  dextrose 50% Injectable 25 Gram(s) IV Push once  dextrose 50% Injectable 25 Gram(s) IV Push once  DULoxetine 60 milliGRAM(s) Oral daily  furosemide    Tablet 20 milliGRAM(s) Oral <User Schedule>  heparin  Injectable 5000 Unit(s) SubCutaneous every 8 hours  hydrALAZINE 25 milliGRAM(s) Oral three times a day  insulin glargine Injectable (LANTUS) 4 Unit(s) SubCutaneous at bedtime  insulin lispro (HumaLOG) corrective regimen sliding scale   SubCutaneous three times a day before meals  insulin lispro (HumaLOG) corrective regimen sliding scale   SubCutaneous at bedtime  insulin lispro Injectable (HumaLOG) 3 Unit(s) SubCutaneous three times a day before meals  lisinopril 40 milliGRAM(s) Oral daily  metoprolol succinate ER 50 milliGRAM(s) Oral daily  multivitamin 1 Tablet(s) Oral daily  sevelamer carbonate 1600 milliGRAM(s) Oral three times a day with meals    MEDICATIONS  (PRN):  dextrose 40% Gel 15 Gram(s) Oral once PRN Blood Glucose LESS THAN 70 milliGRAM(s)/deciliter  glucagon  Injectable 1 milliGRAM(s) IntraMuscular once PRN Glucose LESS THAN 70 milligrams/deciliter  oxyCODONE    5 mG/acetaminophen 325 mG 1 Tablet(s) Oral every 6 hours PRN moderate or severe pain      Allergies  No Known Allergies    Review of Systems:  Constitutional: No fever  Eyes: + blurry vision  Neuro: No tremors  HEENT: No pain  Cardiovascular: No chest pain, palpitations  Respiratory: No SOB, no cough  GI: No nausea, vomiting, abdominal pain  : No dysuria  Skin: no rash  Endocrine: no polyuria, polydipsia  ALL OTHER SYSTEMS REVIEWED AND NEGATIVE    PHYSICAL EXAM:  VITALS: T(C): 36.3 (06-11-19 @ 12:12)  T(F): 97.3 (06-11-19 @ 12:12), Max: 98.7 (06-10-19 @ 21:10)  HR: 105 (06-11-19 @ 12:12) (71 - 105)  BP: 144/79 (06-11-19 @ 12:12) (137/57 - 168/79)  RR:  (17 - 18)  SpO2:  (95% - 100%)  Wt(kg): --  GENERAL: NAD, well-developed  EYES: No proptosis, anicteric  HEENT:  Atraumatic, Normocephalic,  THYROID: Normal size, no palpable nodules  RESPIRATORY: Clear to auscultation bilaterally; No rales, rhonchi, wheezing  CARDIOVASCULAR: Regular rate and rhythm; No murmurs; 2+ pitting edema left lower extremity and 1+ in RLE to the ankle  GI: Soft, nontender, non distended, normal bowel sounds  SKIN: Dry, intact, No ulcers or wounds on feet  PSYCH: Alert and oriented x 2, reactive affect    POCT Blood Glucose.: 194 mg/dL (06-11-19 @ 11:38) H 3, H 1  POCT Blood Glucose.: 217 mg/dL (06-11-19 @ 08:10) H 3, H 2  POCT Blood Glucose.: 293 mg/dL (06-11-19 @ 03:35)  POCT Blood Glucose.: 422 mg/dL (06-11-19 @ 01:15)  POCT Blood Glucose.: 459 mg/dL (06-11-19 @ 01:14) L 4  POCT Blood Glucose.: 478 mg/dL (06-11-19 @ 00:37) H 5  POCT Blood Glucose.: 542 mg/dL (06-10-19 @ 23:45) R 4 IV  POCT Blood Glucose.: 505 mg/dL (06-10-19 @ 22:43) R 6 IV  POCT Blood Glucose.: 446 mg/dL (06-10-19 @ 21:18)                          9.5    3.48  )-----------( 199      ( 11 Jun 2019 08:31 )             29.6       06-11    135  |  91<L>  |  23  ----------------------------<  208<H>  3.9   |  25  |  2.81<H>    EGFR if : 20<L>  EGFR if non : 17<L>    Ca    9.1      06-11  Mg     2.0     06-11  Phos  3.3     06-11    TPro  6.8  /  Alb  3.9  /  TBili  0.3  /  DBili  x   /  AST  18  /  ALT  14  /  AlkPhos  72  06-11      Hemoglobin A1C, Whole Blood: 8.8 % <H> [4.0 - 5.6] (04-30-19 @ 08:58)

## 2019-06-12 ENCOUNTER — TRANSCRIPTION ENCOUNTER (OUTPATIENT)
Age: 64
End: 2019-06-12

## 2019-06-12 VITALS
DIASTOLIC BLOOD PRESSURE: 63 MMHG | HEART RATE: 68 BPM | SYSTOLIC BLOOD PRESSURE: 131 MMHG | OXYGEN SATURATION: 96 % | RESPIRATION RATE: 18 BRPM | WEIGHT: 108.03 LBS | TEMPERATURE: 98 F

## 2019-06-12 LAB
ANION GAP SERPL CALC-SCNC: 15 MMOL/L — SIGNIFICANT CHANGE UP (ref 5–17)
BUN SERPL-MCNC: 37 MG/DL — HIGH (ref 7–23)
CALCIUM SERPL-MCNC: 9.5 MG/DL — SIGNIFICANT CHANGE UP (ref 8.4–10.5)
CHLORIDE SERPL-SCNC: 92 MMOL/L — LOW (ref 96–108)
CO2 SERPL-SCNC: 29 MMOL/L — SIGNIFICANT CHANGE UP (ref 22–31)
CREAT SERPL-MCNC: 4.69 MG/DL — HIGH (ref 0.5–1.3)
GLUCOSE BLDC GLUCOMTR-MCNC: 122 MG/DL — HIGH (ref 70–99)
GLUCOSE BLDC GLUCOMTR-MCNC: 131 MG/DL — HIGH (ref 70–99)
GLUCOSE BLDC GLUCOMTR-MCNC: 154 MG/DL — HIGH (ref 70–99)
GLUCOSE SERPL-MCNC: 112 MG/DL — HIGH (ref 70–99)
HCT VFR BLD CALC: 30.3 % — LOW (ref 34.5–45)
HGB BLD-MCNC: 9.4 G/DL — LOW (ref 11.5–15.5)
MCHC RBC-ENTMCNC: 31 GM/DL — LOW (ref 32–36)
MCHC RBC-ENTMCNC: 32.6 PG — SIGNIFICANT CHANGE UP (ref 27–34)
MCV RBC AUTO: 105.2 FL — HIGH (ref 80–100)
PLATELET # BLD AUTO: 219 K/UL — SIGNIFICANT CHANGE UP (ref 150–400)
POTASSIUM SERPL-MCNC: 5.2 MMOL/L — SIGNIFICANT CHANGE UP (ref 3.5–5.3)
POTASSIUM SERPL-SCNC: 5.2 MMOL/L — SIGNIFICANT CHANGE UP (ref 3.5–5.3)
RBC # BLD: 2.88 M/UL — LOW (ref 3.8–5.2)
RBC # FLD: 13.7 % — SIGNIFICANT CHANGE UP (ref 10.3–14.5)
SODIUM SERPL-SCNC: 136 MMOL/L — SIGNIFICANT CHANGE UP (ref 135–145)
WBC # BLD: 4.35 K/UL — SIGNIFICANT CHANGE UP (ref 3.8–10.5)
WBC # FLD AUTO: 4.35 K/UL — SIGNIFICANT CHANGE UP (ref 3.8–10.5)

## 2019-06-12 PROCEDURE — 84132 ASSAY OF SERUM POTASSIUM: CPT

## 2019-06-12 PROCEDURE — 85014 HEMATOCRIT: CPT

## 2019-06-12 PROCEDURE — 84484 ASSAY OF TROPONIN QUANT: CPT

## 2019-06-12 PROCEDURE — 96374 THER/PROPH/DIAG INJ IV PUSH: CPT

## 2019-06-12 PROCEDURE — 85027 COMPLETE CBC AUTOMATED: CPT

## 2019-06-12 PROCEDURE — 83605 ASSAY OF LACTIC ACID: CPT

## 2019-06-12 PROCEDURE — 82962 GLUCOSE BLOOD TEST: CPT

## 2019-06-12 PROCEDURE — 80048 BASIC METABOLIC PNL TOTAL CA: CPT

## 2019-06-12 PROCEDURE — 82435 ASSAY OF BLOOD CHLORIDE: CPT

## 2019-06-12 PROCEDURE — 94640 AIRWAY INHALATION TREATMENT: CPT

## 2019-06-12 PROCEDURE — 84100 ASSAY OF PHOSPHORUS: CPT

## 2019-06-12 PROCEDURE — 85730 THROMBOPLASTIN TIME PARTIAL: CPT

## 2019-06-12 PROCEDURE — 96375 TX/PRO/DX INJ NEW DRUG ADDON: CPT

## 2019-06-12 PROCEDURE — 82330 ASSAY OF CALCIUM: CPT

## 2019-06-12 PROCEDURE — 71045 X-RAY EXAM CHEST 1 VIEW: CPT

## 2019-06-12 PROCEDURE — 70450 CT HEAD/BRAIN W/O DYE: CPT

## 2019-06-12 PROCEDURE — 99285 EMERGENCY DEPT VISIT HI MDM: CPT | Mod: 25

## 2019-06-12 PROCEDURE — 99261: CPT

## 2019-06-12 PROCEDURE — 84295 ASSAY OF SERUM SODIUM: CPT

## 2019-06-12 PROCEDURE — 96376 TX/PRO/DX INJ SAME DRUG ADON: CPT

## 2019-06-12 PROCEDURE — 99232 SBSQ HOSP IP/OBS MODERATE 35: CPT

## 2019-06-12 PROCEDURE — 82010 KETONE BODYS QUAN: CPT

## 2019-06-12 PROCEDURE — 93005 ELECTROCARDIOGRAM TRACING: CPT

## 2019-06-12 PROCEDURE — 80053 COMPREHEN METABOLIC PANEL: CPT

## 2019-06-12 PROCEDURE — 80307 DRUG TEST PRSMV CHEM ANLYZR: CPT

## 2019-06-12 PROCEDURE — 82947 ASSAY GLUCOSE BLOOD QUANT: CPT

## 2019-06-12 PROCEDURE — 82803 BLOOD GASES ANY COMBINATION: CPT

## 2019-06-12 PROCEDURE — 83735 ASSAY OF MAGNESIUM: CPT

## 2019-06-12 PROCEDURE — 85610 PROTHROMBIN TIME: CPT

## 2019-06-12 RX ORDER — METOPROLOL TARTRATE 50 MG
1 TABLET ORAL
Qty: 0 | Refills: 0 | DISCHARGE
Start: 2019-06-12

## 2019-06-12 RX ORDER — LISINOPRIL 2.5 MG/1
1 TABLET ORAL
Qty: 0 | Refills: 0 | DISCHARGE
Start: 2019-06-12

## 2019-06-12 RX ORDER — HYDRALAZINE HCL 50 MG
4 TABLET ORAL
Qty: 0 | Refills: 0 | DISCHARGE
Start: 2019-06-12

## 2019-06-12 RX ORDER — INSULIN LISPRO 100/ML
3 VIAL (ML) SUBCUTANEOUS
Qty: 0 | Refills: 0 | DISCHARGE
Start: 2019-06-12

## 2019-06-12 RX ORDER — HYDRALAZINE HCL 50 MG
1 TABLET ORAL
Qty: 0 | Refills: 0 | DISCHARGE
Start: 2019-06-12

## 2019-06-12 RX ORDER — INSULIN LISPRO 100/ML
3 VIAL (ML) SUBCUTANEOUS
Qty: 0 | Refills: 0 | DISCHARGE

## 2019-06-12 RX ORDER — HYDRALAZINE HCL 50 MG
4 TABLET ORAL
Qty: 0 | Refills: 0 | DISCHARGE

## 2019-06-12 RX ORDER — LISINOPRIL 2.5 MG/1
1 TABLET ORAL
Qty: 0 | Refills: 0 | DISCHARGE

## 2019-06-12 RX ORDER — SEVELAMER CARBONATE 2400 MG/1
3 POWDER, FOR SUSPENSION ORAL
Qty: 0 | Refills: 0 | DISCHARGE
Start: 2019-06-12

## 2019-06-12 RX ORDER — CLOPIDOGREL BISULFATE 75 MG/1
1 TABLET, FILM COATED ORAL
Qty: 0 | Refills: 0 | DISCHARGE
Start: 2019-06-12

## 2019-06-12 RX ORDER — INSULIN LISPRO 100/ML
5 VIAL (ML) SUBCUTANEOUS
Qty: 0 | Refills: 0 | DISCHARGE
Start: 2019-06-12

## 2019-06-12 RX ORDER — INSULIN LISPRO 100/ML
4 VIAL (ML) SUBCUTANEOUS
Qty: 0 | Refills: 0 | DISCHARGE
Start: 2019-06-12

## 2019-06-12 RX ORDER — HYDRALAZINE HCL 50 MG
2 TABLET ORAL
Qty: 0 | Refills: 0 | DISCHARGE
Start: 2019-06-12

## 2019-06-12 RX ORDER — FUROSEMIDE 40 MG
1 TABLET ORAL
Qty: 13 | Refills: 0
Start: 2019-06-12 | End: 2019-07-11

## 2019-06-12 RX ORDER — INSULIN LISPRO 100/ML
10 VIAL (ML) SUBCUTANEOUS
Qty: 0 | Refills: 0 | DISCHARGE
Start: 2019-06-12

## 2019-06-12 RX ORDER — SEVELAMER CARBONATE 2400 MG/1
2 POWDER, FOR SUSPENSION ORAL
Qty: 0 | Refills: 0 | DISCHARGE
Start: 2019-06-12

## 2019-06-12 RX ADMIN — Medication 50 MILLIGRAM(S): at 06:49

## 2019-06-12 RX ADMIN — CLOPIDOGREL BISULFATE 75 MILLIGRAM(S): 75 TABLET, FILM COATED ORAL at 12:21

## 2019-06-12 RX ADMIN — Medication 20 MILLIGRAM(S): at 08:06

## 2019-06-12 RX ADMIN — Medication 3 UNIT(S): at 08:06

## 2019-06-12 RX ADMIN — Medication 25 MILLIGRAM(S): at 06:48

## 2019-06-12 RX ADMIN — Medication 1 TABLET(S): at 12:21

## 2019-06-12 RX ADMIN — Medication 3 UNIT(S): at 12:21

## 2019-06-12 RX ADMIN — SEVELAMER CARBONATE 1600 MILLIGRAM(S): 2400 POWDER, FOR SUSPENSION ORAL at 08:06

## 2019-06-12 RX ADMIN — Medication 25 MILLIGRAM(S): at 17:19

## 2019-06-12 RX ADMIN — Medication 81 MILLIGRAM(S): at 12:21

## 2019-06-12 RX ADMIN — LISINOPRIL 40 MILLIGRAM(S): 2.5 TABLET ORAL at 08:06

## 2019-06-12 RX ADMIN — SEVELAMER CARBONATE 1600 MILLIGRAM(S): 2400 POWDER, FOR SUSPENSION ORAL at 12:21

## 2019-06-12 NOTE — DISCHARGE NOTE NURSING/CASE MANAGEMENT/SOCIAL WORK - NSDCDPATPORTLINK_GEN_ALL_CORE
You can access the [email or web address], offered by [facility], by registering with the following website: [email or web address]

## 2019-06-12 NOTE — PROGRESS NOTE ADULT - PROBLEM SELECTOR PROBLEM 1
Type 1 diabetes mellitus with hyperglycemia
Uncontrolled type 1 diabetes mellitus with hyperglycemia

## 2019-06-12 NOTE — PROGRESS NOTE ADULT - SUBJECTIVE AND OBJECTIVE BOX
[facility]   [phone]  DIALYSIS NOTE  Chief Complaint: ESRD/Ongoing hemodialysis requirement. seen on hd    24 hour events/subjective:    states breathing         ALLERGIES & MEDICATIONS  --------------------------------------------------------------------------------  Allergies    No Known Allergies    Intolerances      Standing Inpatient Medications  aspirin enteric coated 81 milliGRAM(s) Oral daily  atorvastatin 20 milliGRAM(s) Oral at bedtime  clopidogrel Tablet 75 milliGRAM(s) Oral daily  dextrose 5%. 1000 milliLiter(s) IV Continuous <Continuous>  dextrose 50% Injectable 12.5 Gram(s) IV Push once  dextrose 50% Injectable 25 Gram(s) IV Push once  dextrose 50% Injectable 25 Gram(s) IV Push once  furosemide    Tablet 20 milliGRAM(s) Oral <User Schedule>  heparin  Injectable 5000 Unit(s) SubCutaneous every 8 hours  hydrALAZINE 25 milliGRAM(s) Oral three times a day  insulin glargine Injectable (LANTUS) 4 Unit(s) SubCutaneous at bedtime  insulin lispro (HumaLOG) corrective regimen sliding scale   SubCutaneous three times a day before meals  insulin lispro (HumaLOG) corrective regimen sliding scale   SubCutaneous at bedtime  insulin lispro Injectable (HumaLOG) 3 Unit(s) SubCutaneous three times a day before meals  lisinopril 40 milliGRAM(s) Oral daily  metoprolol succinate ER 50 milliGRAM(s) Oral daily  multivitamin 1 Tablet(s) Oral daily  sevelamer carbonate 1600 milliGRAM(s) Oral three times a day with meals    PRN Inpatient Medications  dextrose 40% Gel 15 Gram(s) Oral once PRN  glucagon  Injectable 1 milliGRAM(s) IntraMuscular once PRN  oxyCODONE    5 mG/acetaminophen 325 mG 1 Tablet(s) Oral every 6 hours PRN      REVIEW OF SYSTEMS  --------------------------------------------------------------------------------  no itching or rash  no fever or chill  no cp or palp   no sob or cough   no N/V/D/ no abd pain   ext no edema        VITALS/PHYSICAL EXAM  --------------------------------------------------------------------------------  T(C): 36.8 (06-12-19 @ 16:35), Max: 36.9 (06-11-19 @ 20:24)  HR: 68 (06-12-19 @ 16:35) (68 - 81)  BP: 131/63 (06-12-19 @ 16:35) (117/66 - 151/75)  RR: 18 (06-12-19 @ 16:35) (18 - 18)  SpO2: 96% (06-12-19 @ 16:35) (96% - 97%)  Wt(kg): --    Weight (kg): 45.4 (06-10-19 @ 21:10)      06-11-19 @ 07:01  -  06-12-19 @ 07:00  --------------------------------------------------------  IN: 260 mL / OUT: 0 mL / NET: 260 mL    06-12-19 @ 07:01  -  06-12-19 @ 19:26  --------------------------------------------------------  IN: 1140 mL / OUT: 3400 mL / NET: -2260 mL      Physical Exam:  		  Gen: chronically ill appearing mild sob   	no jvd   	Pulm: decrease bs  +  rales no  ronchi no  wheezing  	CV: RRR, S1S2; no rub  	Back: No CVA tenderness  	Abd: +BS, soft, nontender/nondistended  	: No suprapubic tenderness  	UE: Warm, no cyanosis  no clubbing,  no edema; no asterixis  	LE: Warm, no cyanosis  no clubbing, 2-3 + pitting  edema  	    LABS/STUDIES  --------------------------------------------------------------------------------              9.4    4.35  >-----------<  219      [06-12-19 @ 08:49]              30.3     136  |  92  |  37  ----------------------------<  112      [06-12-19 @ 06:13]  5.2   |  29  |  4.69        Ca     9.5     [06-12-19 @ 06:13]      Mg     2.2     [06-11-19 @ 21:52]      Phos  5.5     [06-11-19 @ 21:52]    TPro  6.8  /  Alb  3.9  /  TBili  0.3  /  DBili  x   /  AST  18  /  ALT  14  /  AlkPhos  72  [06-11-19 @ 07:04]    PT/INR: PT 11.3 , INR 0.98       [06-10-19 @ 21:51]  PTT: 28.5       [06-10-19 @ 21:51]              imp/suggest: ESRD      Hemodialysis Prescription:  	Access:  	Dialyzer: revaclear   	Blood Flow (mL/Min): 400  	Dialysate Flow (mL/Min): 600  	Target UF (Liters):  	Treatment Time:  	Potassium:   	Calcium: 2.5  	  YOLANDA    Vitamin D     continue with hd   see hd flow sheet

## 2019-06-12 NOTE — PROGRESS NOTE ADULT - PROBLEM SELECTOR PLAN 1
-FSG 500s, AG 18, bhb 0.9; no acidosis on vbg, bicarb 22; appears to have borderline or very early DKA but will likely improve with insulin she has received overnight  -c/w fsg qac/qhs  -c/w lantus 4 qhs, humalog 3 units premeal, HISS  -endo follow
-test BG AC/HS  -c/w Lantus 4 units QHS  -c/w Humalog 3 units AC meals  -c/w Humalog low correction scale AC and Low HS scale.  d/c on basal bolus insulin with outpatient follow up with Dr. Pina Ley.  -discussed w/pt and medicine NP  pager: [phone]

## 2019-06-12 NOTE — DISCHARGE NOTE PROVIDER - NSDCCPCAREPLAN_GEN_ALL_CORE_FT
PRINCIPAL DISCHARGE DIAGNOSIS  Diagnosis: Hyperkalemia  Assessment and Plan of Treatment: resolved.      SECONDARY DISCHARGE DIAGNOSES  Diagnosis: ESRD (end stage renal disease) on dialysis  Assessment and Plan of Treatment: Avoid taking (NSAIDs) - (ex: Ibuprofen, Advil, Celebrex, Naprosyn)  Avoid taking any nephrotoxic agents (can harm kidneys) - Intravenous contrast for diagnostic testing, combination cold medications.  Have all medications adjusted for your renal function by your Health Care Provider.  Blood pressure control is important.  Take all medication as prescribed.      Diagnosis: Hyperglycemia  Assessment and Plan of Treatment: stable.  Follow up with your endocrinologist as an outpatient. PRINCIPAL DISCHARGE DIAGNOSIS  Diagnosis: Hyperkalemia  Assessment and Plan of Treatment: resolved.      SECONDARY DISCHARGE DIAGNOSES  Diagnosis: ESRD (end stage renal disease) on dialysis  Assessment and Plan of Treatment: Avoid taking (NSAIDs) - (ex: Ibuprofen, Advil, Celebrex, Naprosyn)  Avoid taking any nephrotoxic agents (can harm kidneys) - Intravenous contrast for diagnostic testing, combination cold medications.  Have all medications adjusted for your renal function by your Health Care Provider.  Blood pressure control is important.  Take all medication as prescribed.      Diagnosis: Essential hypertension  Assessment and Plan of Treatment: Low salt diet  Activity as tolerated.  Take all medication as prescribed.  Follow up with your medical doctor for routine blood pressure monitoring at your next visit.  Notify your doctor if you have any of the following symptoms:   Dizziness, Lightheadedness, Blurry vision, Headache, Chest pain, Shortness of breath      Diagnosis: Hyperglycemia  Assessment and Plan of Treatment: stable.  Follow up with your endocrinologist as an outpatient.

## 2019-06-12 NOTE — PROGRESS NOTE ADULT - SUBJECTIVE AND OBJECTIVE BOX
Diabetes Follow up note:  Interval Hx:   63 year old woman with PMH uncontrolled DM1 no longer on insulin pump, HTN, HLD, CAD s/p PCI, CHF, COPD, CVA, ESRD on HD (M,Tu,Th,Sa), Gout, PVD, Sublcavian Stensosis (L) s/p stent, here with hyperglycemia from home. Patient is well known to our service as she has had multiple admission for DKA/hyperglycemia. Pt seen at bedside during HD. Going home later today per pt. Pt unable to recall circumstances that lead to this admission.     Review of Systems:  General:  GI: Tolerating POs without any N/V/D/ABD PAIN.  CV: No CP/SOB  ENDO: No S&Sx of hypoglycemia  MEDS:  atorvastatin 20 milliGRAM(s) Oral at bedtime    insulin glargine Injectable (LANTUS) 4 Unit(s) SubCutaneous at bedtime  insulin lispro (HumaLOG) corrective regimen sliding scale   SubCutaneous three times a day before meals  insulin lispro (HumaLOG) corrective regimen sliding scale   SubCutaneous at bedtime  insulin lispro Injectable (HumaLOG) 3 Unit(s) SubCutaneous three times a day before meals      Allergies    No Known Allergies        PE:  General:  Vital Signs Last 24 Hrs  T(C): 36.8 (12 Jun 2019 13:00), Max: 36.9 (11 Jun 2019 20:24)  T(F): 98.3 (12 Jun 2019 13:00), Max: 98.5 (11 Jun 2019 20:24)  HR: 69 (12 Jun 2019 13:00) (69 - 81)  BP: 124/64 (12 Jun 2019 13:00) (117/66 - 166/85)  BP(mean): --  RR: 18 (12 Jun 2019 13:00) (18 - 18)  SpO2: 97% (12 Jun 2019 13:00) (96% - 97%)  Abd: Soft, NT,ND,   Extremities: Warm  Neuro: A&O X3    LABS:    POCT Blood Glucose.: 131 mg/dL (06-12-19 @ 12:07)  POCT Blood Glucose.: 122 mg/dL (06-12-19 @ 07:46)  POCT Blood Glucose.: 231 mg/dL (06-11-19 @ 21:26)  POCT Blood Glucose.: 272 mg/dL (06-11-19 @ 17:22)  POCT Blood Glucose.: 194 mg/dL (06-11-19 @ 11:38)  POCT Blood Glucose.: 217 mg/dL (06-11-19 @ 08:10)  POCT Blood Glucose.: 293 mg/dL (06-11-19 @ 03:35)  POCT Blood Glucose.: 422 mg/dL (06-11-19 @ 01:15)  POCT Blood Glucose.: 459 mg/dL (06-11-19 @ 01:14)  POCT Blood Glucose.: 478 mg/dL (06-11-19 @ 00:37)  POCT Blood Glucose.: 542 mg/dL (06-10-19 @ 23:45)  POCT Blood Glucose.: 505 mg/dL (06-10-19 @ 22:43)  POCT Blood Glucose.: 446 mg/dL (06-10-19 @ 21:18)                            9.4    4.35  )-----------( 219      ( 12 Jun 2019 08:49 )             30.3       06-12    136  |  92<L>  |  37<H>  ----------------------------<  112<H>  5.2   |  29  |  4.69<H>    Ca    9.5      12 Jun 2019 06:13  Phos  5.5     06-11  Mg     2.2     06-11    TPro  6.8  /  Alb  3.9  /  TBili  0.3  /  DBili  x   /  AST  18  /  ALT  14  /  AlkPhos  72  06-11      Thyroid Function Tests:      Hemoglobin A1C, Whole Blood: 8.8 % <H> [4.0 - 5.6] (04-30-19 @ 08:58)            Contact number: isabel [phone] or [phone] Diabetes Follow up note:  Interval Hx:   63 year old woman with PMH uncontrolled DM1 no longer on insulin pump, HTN, HLD, CAD s/p PCI, CHF, COPD, CVA, ESRD on HD (M,Tu,Th,Sa), Gout, PVD, Sublcavian Stensosis (L) s/p stent, here with hyperglycemia from home. Patient is well known to our service as she has had multiple admission for DKA/hyperglycemia. Pt seen at bedside during HD. Going home later today per pt. Pt unable to recall circumstances that lead to this admission. Pt did not eat lunch when present at bedside despite getting premeal dose. Denies hypoglycemia symptoms, provided pt w/sheila crackers to prevent low.     Review of Systems:  General: + sleepy at time of visit.   GI: Tolerating POs without any N/V/D/ABD PAIN.  CV: No CP/SOB  ENDO: No S&Sx of hypoglycemia  MEDS:  atorvastatin 20 milliGRAM(s) Oral at bedtime    insulin glargine Injectable (LANTUS) 4 Unit(s) SubCutaneous at bedtime  insulin lispro (HumaLOG) corrective regimen sliding scale   SubCutaneous three times a day before meals  insulin lispro (HumaLOG) corrective regimen sliding scale   SubCutaneous at bedtime  insulin lispro Injectable (HumaLOG) 3 Unit(s) SubCutaneous three times a day before meals      Allergies    No Known Allergies        PE:  General: Female lying in bed. NAD receiving HD.   Vital Signs Last 24 Hrs  T(C): 36.8 (12 Jun 2019 13:00), Max: 36.9 (11 Jun 2019 20:24)  T(F): 98.3 (12 Jun 2019 13:00), Max: 98.5 (11 Jun 2019 20:24)  HR: 69 (12 Jun 2019 13:00) (69 - 81)  BP: 124/64 (12 Jun 2019 13:00) (117/66 - 166/85)  BP(mean): --  RR: 18 (12 Jun 2019 13:00) (18 - 18)  SpO2: 97% (12 Jun 2019 13:00) (96% - 97%)  Abd: Soft, NT,ND,   Extremities: Warm. no edema. L AV fistula in place.   Neuro: A&O X3    LABS:    POCT Blood Glucose.: 131 mg/dL (06-12-19 @ 12:07)  POCT Blood Glucose.: 122 mg/dL (06-12-19 @ 07:46)  POCT Blood Glucose.: 231 mg/dL (06-11-19 @ 21:26)  POCT Blood Glucose.: 272 mg/dL (06-11-19 @ 17:22)  POCT Blood Glucose.: 194 mg/dL (06-11-19 @ 11:38)  POCT Blood Glucose.: 217 mg/dL (06-11-19 @ 08:10)  POCT Blood Glucose.: 293 mg/dL (06-11-19 @ 03:35)  POCT Blood Glucose.: 422 mg/dL (06-11-19 @ 01:15)  POCT Blood Glucose.: 459 mg/dL (06-11-19 @ 01:14)  POCT Blood Glucose.: 478 mg/dL (06-11-19 @ 00:37)  POCT Blood Glucose.: 542 mg/dL (06-10-19 @ 23:45)  POCT Blood Glucose.: 505 mg/dL (06-10-19 @ 22:43)  POCT Blood Glucose.: 446 mg/dL (06-10-19 @ 21:18)                            9.4    4.35  )-----------( 219      ( 12 Jun 2019 08:49 )             30.3       06-12    136  |  92<L>  |  37<H>  ----------------------------<  112<H>  5.2   |  29  |  4.69<H>    Ca    9.5      12 Jun 2019 06:13  Phos  5.5     06-11  Mg     2.2     06-11    TPro  6.8  /  Alb  3.9  /  TBili  0.3  /  DBili  x   /  AST  18  /  ALT  14  /  AlkPhos  72  06-11        Hemoglobin A1C, Whole Blood: 8.8 % <H> [4.0 - 5.6] (04-30-19 @ 08:58)            Contact number: isabel [phone] or [phone]

## 2019-06-12 NOTE — PROGRESS NOTE ADULT - ASSESSMENT
63 F  PMH CAD Sp PTCA w stents CHF,COPD, CVA, DMT1, ESRD on HD (M,Tu,Th,Sa), Gout, HLD, PVD, Sublcavian Stensosis (L) sp stent. PSH ASD Repair,idania,fem-pop bypass, recurrent admission for hyper/hypoglycemia/DKA, p/w cc of hyperglycemia as outpatient, found with hyperkalemia requiring urgent HD overnight.
63 F  PMH CAD Sp PTCA w stents CHF,COPD, CVA, DMT1, ESRD on HD (M,Tu,Th,Sa), Gout, HLD, PVD, Sublcavian Stensosis (L) sp stent. PSH ASD Repair,idania,fem-pop bypass, recurrent admission for hyper/hypoglycemia/DKA, p/w cc of hyperglycemia  and hyperkalemia along with chf      1- hyperkalemia  2- hyperglycemia  3- htn   4- chf   5- shpt    HD revaclear 300 3. 5h 2k bfr 400 dfr 600 2.5 liter   see hd flow sheet
63 year old woman with uncontrolled DM1, HTN, HLD, CAD, PVD, ESRD on HD here with hyperglycemia in setting of uncontrolled DM1. BG values improved on present insulin regimen. Pt w/frequent admissions and poor short term memory. Will discuss w/family. Pt will require supervision to ensure safety in managing her insulin. BG goal (100-200mg/dl).

## 2019-06-12 NOTE — PROGRESS NOTE ADULT - SUBJECTIVE AND OBJECTIVE BOX
Patient is a 63y old  Female who presents with a chief complaint of hyperglycemia, hyperkalemia (12 Jun 2019 12:19)      SUBJECTIVE / OVERNIGHT EVENTS: feels better  Review of Systems  chest pain no  palpitations no  sob no  nausea no  headache no    MEDICATIONS  (STANDING):  aspirin enteric coated 81 milliGRAM(s) Oral daily  atorvastatin 20 milliGRAM(s) Oral at bedtime  clopidogrel Tablet 75 milliGRAM(s) Oral daily  dextrose 5%. 1000 milliLiter(s) (50 mL/Hr) IV Continuous <Continuous>  dextrose 50% Injectable 12.5 Gram(s) IV Push once  dextrose 50% Injectable 25 Gram(s) IV Push once  dextrose 50% Injectable 25 Gram(s) IV Push once  furosemide    Tablet 20 milliGRAM(s) Oral <User Schedule>  heparin  Injectable 5000 Unit(s) SubCutaneous every 8 hours  hydrALAZINE 25 milliGRAM(s) Oral three times a day  insulin glargine Injectable (LANTUS) 4 Unit(s) SubCutaneous at bedtime  insulin lispro (HumaLOG) corrective regimen sliding scale   SubCutaneous three times a day before meals  insulin lispro (HumaLOG) corrective regimen sliding scale   SubCutaneous at bedtime  insulin lispro Injectable (HumaLOG) 3 Unit(s) SubCutaneous three times a day before meals  lisinopril 40 milliGRAM(s) Oral daily  metoprolol succinate ER 50 milliGRAM(s) Oral daily  multivitamin 1 Tablet(s) Oral daily  sevelamer carbonate 1600 milliGRAM(s) Oral three times a day with meals    MEDICATIONS  (PRN):  dextrose 40% Gel 15 Gram(s) Oral once PRN Blood Glucose LESS THAN 70 milliGRAM(s)/deciliter  glucagon  Injectable 1 milliGRAM(s) IntraMuscular once PRN Glucose LESS THAN 70 milligrams/deciliter  oxyCODONE    5 mG/acetaminophen 325 mG 1 Tablet(s) Oral every 6 hours PRN moderate or severe pain      Vital Signs Last 24 Hrs  T(C): 36.8 (12 Jun 2019 04:50), Max: 36.9 (11 Jun 2019 20:24)  T(F): 98.3 (12 Jun 2019 04:50), Max: 98.5 (11 Jun 2019 20:24)  HR: 81 (12 Jun 2019 08:05) (72 - 81)  BP: 119/67 (12 Jun 2019 08:05) (117/66 - 166/85)  BP(mean): --  RR: 18 (12 Jun 2019 04:50) (18 - 18)  SpO2: 97% (12 Jun 2019 04:50) (96% - 97%)    PHYSICAL EXAM:  GENERAL: NAD, well-developed  HEAD:  Atraumatic, Normocephalic  EYES: EOMI, PERRLA, conjunctiva and sclera clear  NECK: Supple, No JVD  CHEST/LUNG: Clear to auscultation bilaterally; No wheeze  HEART: Regular rate and rhythm; No murmurs, rubs, or gallops  ABDOMEN: Soft, Nontender, Nondistended; Bowel sounds present  EXTREMITIES:  2+ Peripheral Pulses, No clubbing, cyanosis, or edema  PSYCH: AAOx3  NEUROLOGY: non-focal  SKIN: No rashes or lesions    LABS:                        9.4    4.35  )-----------( 219      ( 12 Jun 2019 08:49 )             30.3     06-12    136  |  92<L>  |  37<H>  ----------------------------<  112<H>  5.2   |  29  |  4.69<H>    Ca    9.5      12 Jun 2019 06:13  Phos  5.5     06-11  Mg     2.2     06-11    TPro  6.8  /  Alb  3.9  /  TBili  0.3  /  DBili  x   /  AST  18  /  ALT  14  /  AlkPhos  72  06-11    PT/INR - ( 10 Christian 2019 21:51 )   PT: 11.3 sec;   INR: 0.98 ratio         PTT - ( 10 Christian 2019 21:51 )  PTT:28.5 sec            RADIOLOGY & ADDITIONAL TESTS:    Imaging Personally Reviewed:    Consultant(s) Notes Reviewed:      Care Discussed with Consultants/Other Providers:

## 2019-06-12 NOTE — DISCHARGE NOTE PROVIDER - CARE PROVIDER_API CALL
Daisy Burger (DO)  Nephrology  [address] [address]  Phone: [phone]  Fax: [phone]  Follow Up Time:     Arsalan Castro)  Internal Medicine  [address] [place] [address]  Phone: [phone]  Fax: [phone]  Follow Up Time: Daisy Burger (DO)  Nephrology  [address] [facility]  [place] [address]  Phone: [phone]  Fax: [phone]  Follow Up Time:     Arsalan Castro)  Internal Medicine  [address] [place] [address]  Phone: [phone]  Fax: [phone]  Follow Up Time:     Tee Biswas)  Cardiovascular Disease; Internal Medicine  [address] [place] [address]  Phone: [phone]  Fax: [phone]  Follow Up Time:

## 2019-06-12 NOTE — DISCHARGE NOTE PROVIDER - NSDCFUADDAPPT_GEN_ALL_CORE_FT
Follow up with  your PMD within 1 week of discharge.  Follow up with your Endocrinologist as an outpatient.  Follow up with Nephrologist, continue with your HD schedule. Follow up with  your PMD within 1 week of discharge.  Follow up with your Endocrinologist as an outpatient.  Follow up with Cardiologist as an outpatient.   Follow up with Nephrologist, continue with your HD schedule.

## 2019-06-12 NOTE — CONSULT NOTE ADULT - SUBJECTIVE AND OBJECTIVE BOX
CHIEF COMPLAINT: weakness    HISTORY OF PRESENT ILLNESS:  63 F  PMH CAD Sp PTCA w stents CHF,COPD, CVA, DMT1, ESRD on HD (M,Tu,Th,Sa), Gout, HLD, PVD, Sublcavian Stensosis (L) sp stent. PSH ASD Repair,idania,fem-pop bypass, recurrent admission for hyper/hypoglycemia/DKA, p/w cc of hyperglycemia as outpatient.  Pt states that she is unaware of her exact insulin regimen but knows she takes Lantus qhs and humalog premeal, often eating up to 4-5 meals a day; her  helps with insulin regimen, and is not at bedside.  Her last Lantus dose was the night preceding her presentation to ER. States on repeated FSG measurements during the day, her fsg kept increasing, first in 300s then reading "high." Pt last had HD on 6/10, lasting about 2.5 hours. Pt states her usual dry weight tyron bout 54-56 kg. She currently denies cp, +mild sob, denies f/c, n/v/d, dysuria (does not make urine at baseline).  +chronic L>RLE swelling for many years unchanged. Denies abd distention.  Pt seen in room with dialysis  and dialysis RN present.    Vs: 97.6, 85, 168/79, 18, 97% Ra. Labs: no leukocytosis, chronic stable anemia, chronic stable macrocytosis, plt wnl, coags wnl, cmp c/w ESRD, hyperK 7.1, AG 18, bicarb 22, BHB 0.9, ,  --> 505 --> 542 --> 478, VBG pH 7.35, lactate 1.5.  CXR prelim no focal consolidations. CTH no acute bleed/infarct/shift/mass effect, +chronic lacunar infarcts and chronic microvascular changes. IN ER, pt received, calcium gluconate x 2, albuterol nebs, insulin R 4u x 1, Insulin R 6u x 1, humalog 5u x 1, Lantus 4u x 1, 250 cc NS bolus x 1, prior to medicine team involvement. MICU and renal consulted; not candidate for ICU, plan for HD set to be done on tele floor overnight.      cardiology consulted for asx pAT/NSVT. patient currently resting - denies any cp/sob/palps/dizziness      Allergies    No Known Allergies    MEDICATIONS:  aspirin enteric coated 81 milliGRAM(s) Oral daily  clopidogrel Tablet 75 milliGRAM(s) Oral daily  furosemide    Tablet 20 milliGRAM(s) Oral <User Schedule>  heparin  Injectable 5000 Unit(s) SubCutaneous every 8 hours  hydrALAZINE 25 milliGRAM(s) Oral three times a day  lisinopril 40 milliGRAM(s) Oral daily  metoprolol succinate ER 50 milliGRAM(s) Oral daily  oxyCODONE    5 mG/acetaminophen 325 mG 1 Tablet(s) Oral every 6 hours PRN  atorvastatin 20 milliGRAM(s) Oral at bedtime  dextrose 40% Gel 15 Gram(s) Oral once PRN  dextrose 50% Injectable 12.5 Gram(s) IV Push once  dextrose 50% Injectable 25 Gram(s) IV Push once  dextrose 50% Injectable 25 Gram(s) IV Push once  glucagon  Injectable 1 milliGRAM(s) IntraMuscular once PRN  insulin glargine Injectable (LANTUS) 4 Unit(s) SubCutaneous at bedtime  insulin lispro (HumaLOG) corrective regimen sliding scale   SubCutaneous three times a day before meals  insulin lispro (HumaLOG) corrective regimen sliding scale   SubCutaneous at bedtime  insulin lispro Injectable (HumaLOG) 3 Unit(s) SubCutaneous three times a day before meals  dextrose 5%. 1000 milliLiter(s) IV Continuous <Continuous>  multivitamin 1 Tablet(s) Oral daily      PAST MEDICAL & SURGICAL HISTORY:  COPD (chronic obstructive pulmonary disease)  Localized enlarged lymph nodes  CHF (congestive heart failure): EF 40-45%  Subclavian vein stenosis, left: s/p stent  DKA, type 1: 1/2015  ACS (acute coronary syndrome): 1/2015 - cath revealed 100% ostial stenosis not amenable to PCI - medical management  TIA (transient ischemic attack): x 2 - 8-9 years ago prior to ASD/VSD repair  CAD (coronary artery disease): s/p stents  Gout: past  CVA (cerebral infarction): with no residual, 8 yrs ago, prior to heart surgery - ST memory loss  Peripheral vascular disease: occluded left fem-pop bypass 5/2015  Diabetes mellitus type 1: Insulin Dependent -  ESRD (end stage renal disease): dialysis  M, tue, thursday, saturday  Hyperlipidemia  Status post device closure of ASD: &quot;clamshell&quot;  History of cardiac catheterization: 1/2015 - no intervention  S/P femoral-popliteal bypass surgery: L and R in 2013 with graft; 5/2015 CFA angioplasty left and ileofemoral endarterectomywith vein patch angioplasty of left fem-pop bypass graft  Multiple vascular surgery both leg, left fempop bypass revision 11/2015  AV (arteriovenous fistula): Left AV graft; revision with stent placement 2-3 years ago  S/P cholecystectomy      FAMILY HISTORY:  Family history of smoking  Family history of hypertension  Family history of cancer (Sibling)      SOCIAL HISTORY:    former smoker. independent in adl's      REVIEW OF SYSTEMS:  See HPI, otherwise complete 10 point review of systems negative    [ ] All others negative	      PHYSICAL EXAM:  T(C): 36.8 (06-12-19 @ 04:50), Max: 36.9 (06-11-19 @ 20:24)  HR: 81 (06-12-19 @ 08:05) (72 - 105)  BP: 119/67 (06-12-19 @ 08:05) (117/66 - 166/85)  RR: 18 (06-12-19 @ 04:50) (18 - 18)  SpO2: 97% (06-12-19 @ 04:50) (96% - 98%)  Wt(kg): --  I&O's Summary    11 Jun 2019 07:01  -  12 Jun 2019 07:00  --------------------------------------------------------  IN: 260 mL / OUT: 0 mL / NET: 260 mL        Appearance: No Acute Distress	  HEENT:  Normal oral mucosa, PERRL, EOMI	  Cardiovascular: Normal S1 S2, No JVD, No murmurs/rubs/gallops  Respiratory: Lungs clear to auscultation bilaterally  Gastrointestinal:  Soft, Non-tender, + BS	  Skin: No rashes, No ecchymoses, No cyanosis	  Neurologic: Non-focal  Extremities: No clubbing, cyanosis or edema  Vascular: Peripheral pulses palpable 2+ bilaterally  Psychiatry: A & O x 3, Mood & affect appropriate    Laboratory Data:	 	    CBC Full  -  ( 11 Jun 2019 08:31 )  WBC Count : 3.48 K/uL  Hemoglobin : 9.5 g/dL  Hematocrit : 29.6 %  Platelet Count - Automated : 199 K/uL  Mean Cell Volume : 104.2 fl  Mean Cell Hemoglobin : 33.5 pg  Mean Cell Hemoglobin Concentration : 32.1 gm/dL  Auto Neutrophil # : 2.88 K/uL  Auto Lymphocyte # : 0.19 K/uL  Auto Monocyte # : 0.28 K/uL  Auto Eosinophil # : 0.13 K/uL  Auto Basophil # : 0.00 K/uL  Auto Neutrophil % : 82.0 %  Auto Lymphocyte % : 5.4 %  Auto Monocyte % : 8.1 %  Auto Eosinophil % : 3.6 %  Auto Basophil % : 0.0 %    06-12    136  |  92<L>  |  37<H>  ----------------------------<  112<H>  5.2   |  29  |  4.69<H>  06-11    x   |  x   |  x   ----------------------------<  x   5.0   |  x   |  x     Ca    9.5      12 Jun 2019 06:13  Ca    9.1      11 Jun 2019 07:04  Phos  5.5     06-11  Phos  3.3     06-11  Mg     2.2     06-11  Mg     2.0     06-11    TPro  6.8  /  Alb  3.9  /  TBili  0.3  /  DBili  x   /  AST  18  /  ALT  14  /  AlkPhos  72  06-11  TPro  6.0  /  Alb  3.4  /  TBili  0.2  /  DBili  x   /  AST  15  /  ALT  14  /  AlkPhos  65  06-10          Interpretation of Telemetry: 	nsr nsvt 5 beats, brief pAT    ECG: nsr nonspecific st/t changes 	    Assessment:  -NSVT  -pAT  -hyperkalemia  -recurrent DKA  -volume overload  -ischemic cardiomyopathy, cad s/p multiple stents  -esrd on hd  -PAD s/p prior intervention   -malfunctioning AV fistula      Recs:  -volume overload on admission --> c/w HD to maintain euvolemia  -hypo/hyperglycemia --> f/u endo. improved  -ischemic cardiomyopathy s/p LHC with Dr Vela 2/20 --> severe and diffuse instent restenosis along RCA --> unable to stent due to multiple layers of stenting and prior brachytherapy. we can consider complex intervention if true ischemic sx develop. c/w medical treatment with anti-platelet, statins and anti-anginals for now.  c/w metop succinate 50mg daily for anti-anginal effect and for pAT/NSVT. patient declines ICD for primary prevention at this time, and agree that this would pose an increased infection risk and unlikely to be of significant benefit given overall poor prognosis and ongoing chronic medical issues. can address further as outpatient  -c/w beta blockers for nsvt/pat/cad (as above)  -hx of prolonged qtc. avoid qtc prolonging meds  -s/p recent TTE: mod-severe MR, pseudo AS (low flow-low gradient), mod-severe LV dysfunction --> recent CTS consult with dr hebert appreciated. plan is for medical management given surgical risk and patient preference  -c/w asa, plavix, statin for ischemic cardiomyopathy/CAD/PAD  -dvt ppx          Greater than 60 minutes spent on total encounter; more than 50% of the visit was spent counseling and/or coordinating care by the attending physician.   	  Julián Biswas MD   Cardiovascular Diseases  [phone]

## 2019-06-12 NOTE — DISCHARGE NOTE PROVIDER - PROVIDER TOKENS
PROVIDER:[TOKEN:[[id number]]],PROVIDER:[TOKEN:[[id number]]] PROVIDER:[TOKEN:[[id number]]],PROVIDER:[TOKEN:[[id number]]],PROVIDER:[TOKEN:[[id number]]]

## 2019-06-15 ENCOUNTER — INPATIENT (INPATIENT)
Facility: HOSPITAL | Age: 64
LOS: 5 days | Discharge: HOME | DRG: 871 | End: 2019-06-21
Attending: INTERNAL MEDICINE | Admitting: INTERNAL MEDICINE
Payer: MEDICARE

## 2019-06-15 VITALS
DIASTOLIC BLOOD PRESSURE: 76 MMHG | RESPIRATION RATE: 20 BRPM | SYSTOLIC BLOOD PRESSURE: 145 MMHG | HEART RATE: 97 BPM | OXYGEN SATURATION: 100 % | WEIGHT: 119.93 LBS | TEMPERATURE: 102 F | HEIGHT: 63 IN

## 2019-06-15 DIAGNOSIS — J18.9 PNEUMONIA, UNSPECIFIED ORGANISM: ICD-10-CM

## 2019-06-15 DIAGNOSIS — Z98.89 OTHER SPECIFIED POSTPROCEDURAL STATES: Chronic | ICD-10-CM

## 2019-06-15 LAB
ALBUMIN SERPL ELPH-MCNC: 4.2 G/DL — SIGNIFICANT CHANGE UP (ref 3.3–5)
ALP SERPL-CCNC: 85 U/L — SIGNIFICANT CHANGE UP (ref 40–120)
ALT FLD-CCNC: 29 U/L — SIGNIFICANT CHANGE UP (ref 10–45)
ANION GAP SERPL CALC-SCNC: 16 MMOL/L — SIGNIFICANT CHANGE UP (ref 5–17)
APTT BLD: 27.8 SEC — SIGNIFICANT CHANGE UP (ref 27.5–36.3)
AST SERPL-CCNC: 56 U/L — HIGH (ref 10–40)
BASE EXCESS BLDV CALC-SCNC: 9.2 MMOL/L — HIGH (ref -2–2)
BASOPHILS # BLD AUTO: 0.1 K/UL — SIGNIFICANT CHANGE UP (ref 0–0.2)
BASOPHILS NFR BLD AUTO: 2 % — SIGNIFICANT CHANGE UP (ref 0–2)
BILIRUB SERPL-MCNC: 0.3 MG/DL — SIGNIFICANT CHANGE UP (ref 0.2–1.2)
BUN SERPL-MCNC: 16 MG/DL — SIGNIFICANT CHANGE UP (ref 7–23)
CA-I SERPL-SCNC: 1.13 MMOL/L — SIGNIFICANT CHANGE UP (ref 1.12–1.3)
CALCIUM SERPL-MCNC: 9.5 MG/DL — SIGNIFICANT CHANGE UP (ref 8.4–10.5)
CHLORIDE BLDV-SCNC: 94 MMOL/L — LOW (ref 96–108)
CHLORIDE SERPL-SCNC: 91 MMOL/L — LOW (ref 96–108)
CO2 BLDV-SCNC: 36 MMOL/L — HIGH (ref 22–30)
CO2 SERPL-SCNC: 28 MMOL/L — SIGNIFICANT CHANGE UP (ref 22–31)
CREAT SERPL-MCNC: 3.45 MG/DL — HIGH (ref 0.5–1.3)
EOSINOPHIL # BLD AUTO: 0.1 K/UL — SIGNIFICANT CHANGE UP (ref 0–0.5)
EOSINOPHIL NFR BLD AUTO: 1.7 % — SIGNIFICANT CHANGE UP (ref 0–6)
GAS PNL BLDV: 133 MMOL/L — LOW (ref 136–145)
GAS PNL BLDV: SIGNIFICANT CHANGE UP
GAS PNL BLDV: SIGNIFICANT CHANGE UP
GLUCOSE BLDV-MCNC: 228 MG/DL — HIGH (ref 70–99)
GLUCOSE SERPL-MCNC: 247 MG/DL — HIGH (ref 70–99)
HCO3 BLDV-SCNC: 34 MMOL/L — HIGH (ref 21–29)
HCT VFR BLD CALC: 33.4 % — LOW (ref 34.5–45)
HCT VFR BLDA CALC: 33 % — LOW (ref 39–50)
HGB BLD CALC-MCNC: 10.7 G/DL — LOW (ref 11.5–15.5)
HGB BLD-MCNC: 11.4 G/DL — LOW (ref 11.5–15.5)
HPIV3 RNA SPEC QL NAA+PROBE: DETECTED
INR BLD: 1.04 RATIO — SIGNIFICANT CHANGE UP (ref 0.88–1.16)
LACTATE BLDV-MCNC: 1.9 MMOL/L — SIGNIFICANT CHANGE UP (ref 0.7–2)
LYMPHOCYTES # BLD AUTO: 0.2 K/UL — LOW (ref 1–3.3)
LYMPHOCYTES # BLD AUTO: 4.7 % — LOW (ref 13–44)
MCHC RBC-ENTMCNC: 34.1 GM/DL — SIGNIFICANT CHANGE UP (ref 32–36)
MCHC RBC-ENTMCNC: 35.5 PG — HIGH (ref 27–34)
MCV RBC AUTO: 104 FL — HIGH (ref 80–100)
MONOCYTES # BLD AUTO: 0.3 K/UL — SIGNIFICANT CHANGE UP (ref 0–0.9)
MONOCYTES NFR BLD AUTO: 6.3 % — SIGNIFICANT CHANGE UP (ref 2–14)
NEUTROPHILS # BLD AUTO: 4.1 K/UL — SIGNIFICANT CHANGE UP (ref 1.8–7.4)
NEUTROPHILS NFR BLD AUTO: 85.3 % — HIGH (ref 43–77)
OSMOLALITY SERPL: 288 MOS/KG — SIGNIFICANT CHANGE UP (ref 275–300)
OTHER CELLS CSF MANUAL: 7 ML/DL — LOW (ref 18–22)
PCO2 BLDV: 50 MMHG — SIGNIFICANT CHANGE UP (ref 35–50)
PH BLDV: 7.45 — SIGNIFICANT CHANGE UP (ref 7.35–7.45)
PLATELET # BLD AUTO: 221 K/UL — SIGNIFICANT CHANGE UP (ref 150–400)
PO2 BLDV: 29 MMHG — SIGNIFICANT CHANGE UP (ref 25–45)
POTASSIUM BLDV-SCNC: 4.1 MMOL/L — SIGNIFICANT CHANGE UP (ref 3.5–5.3)
POTASSIUM SERPL-MCNC: 4.2 MMOL/L — SIGNIFICANT CHANGE UP (ref 3.5–5.3)
POTASSIUM SERPL-SCNC: 4.2 MMOL/L — SIGNIFICANT CHANGE UP (ref 3.5–5.3)
PROT SERPL-MCNC: 7.3 G/DL — SIGNIFICANT CHANGE UP (ref 6–8.3)
PROTHROM AB SERPL-ACNC: 11.9 SEC — SIGNIFICANT CHANGE UP (ref 10–12.9)
RAPID RVP RESULT: DETECTED
RBC # BLD: 3.21 M/UL — LOW (ref 3.8–5.2)
RBC # FLD: 12.7 % — SIGNIFICANT CHANGE UP (ref 10.3–14.5)
SAO2 % BLDV: 49 % — LOW (ref 67–88)
SODIUM SERPL-SCNC: 135 MMOL/L — SIGNIFICANT CHANGE UP (ref 135–145)
WBC # BLD: 4.8 K/UL — SIGNIFICANT CHANGE UP (ref 3.8–10.5)
WBC # FLD AUTO: 4.8 K/UL — SIGNIFICANT CHANGE UP (ref 3.8–10.5)

## 2019-06-15 PROCEDURE — 99223 1ST HOSP IP/OBS HIGH 75: CPT

## 2019-06-15 PROCEDURE — 93308 TTE F-UP OR LMTD: CPT | Mod: 26

## 2019-06-15 PROCEDURE — 71045 X-RAY EXAM CHEST 1 VIEW: CPT | Mod: 26

## 2019-06-15 PROCEDURE — 74176 CT ABD & PELVIS W/O CONTRAST: CPT | Mod: 26

## 2019-06-15 PROCEDURE — 99284 EMERGENCY DEPT VISIT MOD MDM: CPT | Mod: GC

## 2019-06-15 RX ORDER — VANCOMYCIN HCL 1 G
1000 VIAL (EA) INTRAVENOUS ONCE
Refills: 0 | Status: COMPLETED | OUTPATIENT
Start: 2019-06-15 | End: 2019-06-16

## 2019-06-15 RX ORDER — SODIUM CHLORIDE 9 MG/ML
1700 INJECTION INTRAMUSCULAR; INTRAVENOUS; SUBCUTANEOUS ONCE
Refills: 0 | Status: DISCONTINUED | OUTPATIENT
Start: 2019-06-15 | End: 2019-06-15

## 2019-06-15 RX ORDER — AZITHROMYCIN 500 MG/1
TABLET, FILM COATED ORAL
Refills: 0 | Status: DISCONTINUED | OUTPATIENT
Start: 2019-06-15 | End: 2019-06-20

## 2019-06-15 RX ORDER — INSULIN HUMAN 100 [IU]/ML
5 INJECTION, SOLUTION SUBCUTANEOUS ONCE
Refills: 0 | Status: COMPLETED | OUTPATIENT
Start: 2019-06-15 | End: 2019-06-15

## 2019-06-15 RX ORDER — AZITHROMYCIN 500 MG/1
500 TABLET, FILM COATED ORAL ONCE
Refills: 0 | Status: COMPLETED | OUTPATIENT
Start: 2019-06-15 | End: 2019-06-15

## 2019-06-15 RX ORDER — IPRATROPIUM/ALBUTEROL SULFATE 18-103MCG
3 AEROSOL WITH ADAPTER (GRAM) INHALATION ONCE
Refills: 0 | Status: COMPLETED | OUTPATIENT
Start: 2019-06-15 | End: 2019-06-15

## 2019-06-15 RX ORDER — INSULIN GLARGINE 100 [IU]/ML
2 INJECTION, SOLUTION SUBCUTANEOUS AT BEDTIME
Refills: 0 | Status: DISCONTINUED | OUTPATIENT
Start: 2019-06-15 | End: 2019-06-16

## 2019-06-15 RX ORDER — AZITHROMYCIN 500 MG/1
500 TABLET, FILM COATED ORAL EVERY 24 HOURS
Refills: 0 | Status: DISCONTINUED | OUTPATIENT
Start: 2019-06-16 | End: 2019-06-20

## 2019-06-15 RX ORDER — PIPERACILLIN AND TAZOBACTAM 4; .5 G/20ML; G/20ML
3.38 INJECTION, POWDER, LYOPHILIZED, FOR SOLUTION INTRAVENOUS ONCE
Refills: 0 | Status: COMPLETED | OUTPATIENT
Start: 2019-06-15 | End: 2019-06-15

## 2019-06-15 RX ORDER — PIPERACILLIN AND TAZOBACTAM 4; .5 G/20ML; G/20ML
2.25 INJECTION, POWDER, LYOPHILIZED, FOR SOLUTION INTRAVENOUS ONCE
Refills: 0 | Status: DISCONTINUED | OUTPATIENT
Start: 2019-06-15 | End: 2019-06-15

## 2019-06-15 RX ORDER — ACETAMINOPHEN 500 MG
650 TABLET ORAL ONCE
Refills: 0 | Status: COMPLETED | OUTPATIENT
Start: 2019-06-15 | End: 2019-06-15

## 2019-06-15 RX ADMIN — Medication 3 MILLILITER(S): at 23:17

## 2019-06-15 RX ADMIN — INSULIN HUMAN 5 UNIT(S): 100 INJECTION, SOLUTION SUBCUTANEOUS at 22:40

## 2019-06-15 RX ADMIN — Medication 650 MILLIGRAM(S): at 21:45

## 2019-06-15 RX ADMIN — PIPERACILLIN AND TAZOBACTAM 200 GRAM(S): 4; .5 INJECTION, POWDER, LYOPHILIZED, FOR SOLUTION INTRAVENOUS at 21:22

## 2019-06-15 RX ADMIN — Medication 650 MILLIGRAM(S): at 21:22

## 2019-06-15 RX ADMIN — AZITHROMYCIN 250 MILLIGRAM(S): 500 TABLET, FILM COATED ORAL at 22:41

## 2019-06-15 RX ADMIN — PIPERACILLIN AND TAZOBACTAM 3.38 GRAM(S): 4; .5 INJECTION, POWDER, LYOPHILIZED, FOR SOLUTION INTRAVENOUS at 21:52

## 2019-06-15 RX ADMIN — INSULIN GLARGINE 2 UNIT(S): 100 INJECTION, SOLUTION SUBCUTANEOUS at 23:18

## 2019-06-15 NOTE — ED PROVIDER NOTE - CLINICAL SUMMARY MEDICAL DECISION MAKING FREE TEXT BOX
64 y/o chronically ill female with recent admission for hyperkalemia p/w shortness of breath and febrile also with abd pain on exam.  Concern for infectious source given recent hospitalization.  Will check sepsis labs, rvp, Ct abd, give Tylenol, abx and admit.  No additional fluids at this time besides abx due to esrd - will reassess volume status.  - Liza Waite, DO 64 y/o chronically ill female with recent admission for hyperkalemia p/w shortness of breath and febrile also with abd pain on exam.  Concern for infectious source given recent hospitalization.  Will check sepsis labs, rvp, Ct abd, give Tylenol, abx and admit.  No additional fluids at this time besides abx due to esrd - will reassess volume status.  - Liza Waite DO  Attending Jana Rincon: 64 y/o female with multiple medical issues including ESRD on hD, last diayliss today, chf, copd, frequent admissions for dka presenting with fever and abdominal pain. on exam pt with diffuse rhonchi and lower abd pain. pocus shows evidence of likely multi focal pna. small amount of anterior B lines seen. pt does not make urine. no evidence of dka on initial labs however pt with sig PMH with frequent admissions. pan cultured and started on abx. with fluids from abx will be very cautious about IVF as sig chf history . pt will need admission, iv abx

## 2019-06-15 NOTE — ED PROVIDER NOTE - PROGRESS NOTE DETAILS
Attending Jana Rincon: pt with h/o CHF, ESRD does not make urine. pt febrile. BP stable. will give abx. hold off on IVF at this time as concern pt may develop interstitial edema. will need monitor sugars as pt with high risk for developing dka Attending Jana Rincon: ct shows evidence of multi focal pneumonia. ext wwp. does have left lower ext edema which is her baseline. will continue to monitor blood sugars. pt with prior admissions to [facility]. will clsoely monitro, if worsens will call [facility]

## 2019-06-15 NOTE — H&P ADULT - PROBLEM SELECTOR PLAN 1
- cont vanc (dosed during HD), zosyn renal dosing, azithro  - check vanc level in AM  - f/u blood cultures

## 2019-06-15 NOTE — ED PROVIDER NOTE - OBJECTIVE STATEMENT
63 F PMH CAD s/p stents, CHF, COPD, CVA, DM, ESRD on HD (M,Tu,Th,Sa), Gout, HLD, PVD, Sublcavian Stensosis (L) s/p stent, ASD Repair, idania, fem-pop bypass, recurrent admission for hyper/hypoglycemia/DKA and recent admission (just discharged 3 days ago) for hyperkalemia requiring emergent HD p/w shortness of breath.  Pt completed full HD earlier today, took a nap at home and woke up feeling short of breath and generally weak.   called EMS because patient was too weak to walk.  EMS found patient with O2 sat of 90% on RA, improved to 100 with NRB.  Pt arrives febrile, tachycardic in ER and complaining of abdominal pain.  no chest pain, nausea/vomiting/diarrhea.

## 2019-06-15 NOTE — ED ADULT NURSE NOTE - NSIMPLEMENTINTERV_GEN_ALL_ED
Implemented All Fall with Harm Risk Interventions:  [place] to call system. Call bell, personal items and telephone within reach. Instruct patient to call for assistance. Room bathroom lighting operational. Non-slip footwear when patient is off stretcher. Physically safe environment: no spills, clutter or unnecessary equipment. Stretcher in lowest position, wheels locked, appropriate side rails in place. Provide visual cue, wrist band, yellow gown, etc. Monitor gait and stability. Monitor for mental status changes and reorient to person, place, and time. Review medications for side effects contributing to fall risk. Reinforce activity limits and safety measures with patient and family. Provide visual clues: red socks.

## 2019-06-15 NOTE — ED ADULT NURSE NOTE - OBJECTIVE STATEMENT
63 y.o F A&Ox3 with PMH of COPD, enlarged lymph nodes, CHF, DKA, ACS, CAD, TIA, Gout, CVA, DM, ESRD (receives dialysis four times a week), HLD, presents to the ED via EMS from home c.o fever, and difficulty breathing. As per family, pt. went to dialysis this morning and woke up from nap this afternoon with fever and shortness of breath. Pt. arrives with 102.4 temperature. Arrives on NR mask - EMS reports pt. was sating at 90% on RA. Pt. placed on 4L NC - sating at 96%. Crackles auscultated bilaterally. Bilateral lower extremity edema noted, worse to LLE - chronic as per family. Pt. recently admitted and discharged a couple of days ago due to hypoglycemia and SOB. FS completed - 226. EKG done. Pt. placed on CM- NSR to the 90s. Pt. anxious at this time and moaning - reports lower ABD discomfort. Nonproductive cough noted. IV access obtained. Safety and comfort provided. Family at bedside.

## 2019-06-15 NOTE — ED ADULT NURSE REASSESSMENT NOTE - NS ED NURSE REASSESS COMMENT FT1
DATE OF PROCEDURE:  11/07/2017      SURGEON:  Hilario Fitzgerald M.D. PREOPERATIVE DIAGNOSIS:  Right carpal tunnel syndrome. POSTOPERATIVE DIAGNOSES:   1. Right carpal tunnel syndrome. 2. Flexor tendinitis, right wrist.      PROCEDURE:   1. Right carpal tunnel release. 2. Right wrist radical flexor tenosynovectomy. ASSISTANT:  PEGGY Jackman ANESTHESIA:  MAC.      COMPLICATIONS:  None. SPECIMEN:  None. TOURNIQUET TIME:  15 minutes. INDICATION FOR OPERATION:  The patient is a 53-year-old female, who presents   with carpal tunnel syndrome and unresponsive to conservative treatment. She   presents for carpal tunnel release. OPERATIVE PROCEDURE:  The patient's right hand was confirmed as the operative   site in the preoperative holding area. The patient was taken to the operating   room and placed in supine position. The patient's operative site and procedure   reconfirmed. At this point, right upper extremity was prepped and draped in   routine sterile fashion with alcohol and ChloraPrep. After successful sedation   was achieved, local anesthesia was infiltrated, 10 mL of 0.25% Marcaine, 1%   lidocaine plain 50:50. This was later supplemented with additional 4 mL. Attention was turned to the palmar aspect of the hand where curvilinear incision   was made along the third web space through the skin down to the level of   subcutaneous tissue. Dissection was carried deeply through the palmar fascia to   the deep fat pad, which was mobilized off the volar carpal ligament. The volar   carpal ligament was incised and a [place] elevator placed underneath the   transverse carpal ligament, which was opened in antegrade fashion on the ulnar   side of the canal.  Care was taken to protect and identify the median nerve, its   motor and sensory branches, as well as the underlying flexor tendons.   At this   point, the volar carpal ligament and antebrachial fascia were opened in Pt. appears more comfortable at this time. Awaiting imaging. Will continue to monitor. a   retrograde fashion again on the ulnar side of the canal.  Care was taken to   protect the underlying structures. The nerve was freed from surrounding scar   tissue. There was significant thickened flexor tenosynovitis identified and a   right wrist radical flexor tenosynovectomy was then performed. The wounds were   irrigated and closed with 4-0 nylon suture. Sterile dressing was applied. Tourniquet was deflated. Tourniquet time 15 minutes. There was good capillary   refill to all digits. The patient tolerated the procedure well, was transferred   to a stretcher and recovery room in stable condition.   Sponge and needle counts   were correct.         _____________________               ____________________________________   ROBERT Shearer/DANIA-#[id number]   DD:  11/07/2017 10:01:00      DT:  11/14/2017 17:34:12            [address]      REPORT OF OPERATION        Phong Thomas   MRN#: [id number]   ROOM:   [facility]#: [[id number]] Reports

## 2019-06-15 NOTE — ED PROVIDER NOTE - PHYSICAL EXAMINATION
Gen: AOx3, ill appearing  Head: NCAT  HEENT: PERRL, oral mucosa moist, normal conjunctiva  Lung: tachypneic, rhonchi diffusely,    CV: tachycardia, +murmur, Normal perfusion  Abd: soft, diffusely TTP, no CVA tenderness  MSK: no visible deformities, +LLE edema - chronic per patient and   Neuro: No focal neurologic deficits  Skin: No rash Gen: AOx3, ill appearing  Head: NCAT  HEENT: PERRL, oral mucosa moist, normal conjunctiva  Lung: tachypneic, rhonchi diffusely,    CV: tachycardia, +murmur, Normal perfusion  Abd: soft, diffusely TTP, no CVA tenderness  MSK: no visible deformities, +LLE edema - chronic per patient and   Neuro: No focal neurologic deficits  Skin: No rash  Attending Jana Rincon: Gen: NAD, heent: atrauamtic, eomi, perrla, mmm, op pink, uvula midline, neck; nttp, no nuchal rigidity, chest: nttp, no crepitus, cv: rrr, +murmur, lungs: diffuse rhonchi worse on left, abd: soft, nontender, nondistended, no peritoneal signs, +BS, no guarding, ext: wwp,left lower ext edema, skin: no rash, neuro: awake and alert, following commands, speech clear, sensation and strength intact, no focal deficits

## 2019-06-15 NOTE — H&P ADULT - NSHPREVIEWOFSYSTEMS_GEN_ALL_CORE
REVIEW OF SYSTEMS:  CONSTITUTIONAL: +weakness. +fevers. +chills.  CARDIAC: No chest pain. No palpitations.  RESPIRATORY: +cough. +SOB.   GASTROINTESTINAL: No abdominal pain.  BACK: No back pain.  EXTREMITIES: No lower extremity edema. Full ROM.  SKIN: No rashes. No itching. No other lesions.  PSYCHIATRIC: +depression. +anxiety.  Unless indicated above, unable to assess ROS 2/2 pt refusal to answer questions

## 2019-06-15 NOTE — H&P ADULT - HISTORY OF PRESENT ILLNESS
63F c hx CAD (Cath Feb '19 showing 99% RCA, 100% RPLS, 100% Cx) s/p multiple stents/brachytherapy, ESRD (on HD M/T/T/Sa), DM1 c/b neuropathy and retinopathy, CHF (EF 30% per last [facility]), mod MR, severe AS, RHF, TIA, severe PVD s/p b/l fempop bypass, left subclavian vein stenosis s/p stent, COPD not on O2, gout, hilar lymphadenopathy of unknown etiology, poor medical mgmt at home, frequent hospitalizations (usually 1-3 times a month) for DKA, hyperglycemia, PNA, recently discharged 3 days ago for hyperkalemia, pw SOB, coughing for 2 weeks.    Pt is arousable, answers simple questions only, refusing to answer questions in detail, and generally refusing to talk. At baseline, pt is able to ambulate short distances with a cane. Pt reports having her usual HD session today. Pt reports she is here in the hospital for SOB and that she's been coughing for 2 weeks. Pt also reports fevers and chills for 1 day. Pt denies pain anywhere else, and refusing to talk father.    VS: Tm 100.5, P 99, Bp 105/56, R 25, 96% on 4L  In the ED, received lantus 2U, regular insulin 5U, duoneb, tylenol, azithro, vanc, zosyn.    IST[facility] Reference #: [id number]  Rx Written	Rx Dispensed	Drug	Quantity	Days Supply	Prescriber Name  04/17/2019	04/17/2019	oxycodone-acetaminophen 5-325 mg tab	60	30	Tee Biswas MD  03/05/2019	03/05/2019	oxycodone hcl 5 mg tablet	12	2	Zuly Osuna

## 2019-06-15 NOTE — H&P ADULT - NSHPLABSRESULTS_GEN_ALL_CORE
Personally reviewed labs.   Personally reviewed imaging.   Personally reviewed EKG. NSR rate 96, . Q wave in AVR/V1. Biphasic T in V6 only.                          11.4   4.8   )-----------( 221      ( 15 Christian 2019 21:03 )             33.4       06-15    135  |  91<L>  |  16  ----------------------------<  247<H>  4.2   |  28  |  3.45<H>    Ca    9.5      15 Christian 2019 21:03  Phos  3.1     06-15  Mg     1.8     06-15    TPro  7.3  /  Alb  4.2  /  TBili  0.3  /  DBili  x   /  AST  56<H>  /  ALT  29  /  AlkPhos  85  06-15            LIVER FUNCTIONS - ( 15 Christian 2019 21:03 )  Alb: 4.2 g/dL / Pro: 7.3 g/dL / ALK PHOS: 85 U/L / ALT: 29 U/L / AST: 56 U/L / GGT: x             PT/INR - ( 15 Christian 2019 21:03 )   PT: 11.9 sec;   INR: 1.04 ratio         PTT - ( 15 Christian 2019 21:03 )  PTT:27.8 sec

## 2019-06-15 NOTE — H&P ADULT - ASSESSMENT
63F c hx CAD (Cath Feb '19 showing 99% RCA, 100% RPLS, 100% Cx) s/p multiple stents/brachytherapy, ESRD (on HD M/T/T/Sa), DM1 c/b neuropathy and retinopathy, CHF (EF 30% per last [facility]), mod MR, severe AS, RHF, TIA, severe PVD s/p b/l fempop bypass, left subclavian vein stenosis s/p stent, COPD not on O2, gout, hilar lymphadenopathy of unknown etiology, poor medical mgmt at home, frequent hospitalizations (usually 1-3 times a month) for DKA, hyperglycemia, PNA, recently discharged 3 days ago for hyperkalemia, pw sepsis 2/2 parainfluenza and HCAP.

## 2019-06-16 DIAGNOSIS — A41.9 SEPSIS, UNSPECIFIED ORGANISM: ICD-10-CM

## 2019-06-16 DIAGNOSIS — E78.5 HYPERLIPIDEMIA, UNSPECIFIED: ICD-10-CM

## 2019-06-16 DIAGNOSIS — N18.6 END STAGE RENAL DISEASE: ICD-10-CM

## 2019-06-16 DIAGNOSIS — I25.118 ATHEROSCLEROTIC HEART DISEASE OF NATIVE CORONARY ARTERY WITH OTHER FORMS OF ANGINA PECTORIS: ICD-10-CM

## 2019-06-16 DIAGNOSIS — I10 ESSENTIAL (PRIMARY) HYPERTENSION: ICD-10-CM

## 2019-06-16 DIAGNOSIS — B34.8 OTHER VIRAL INFECTIONS OF UNSPECIFIED SITE: ICD-10-CM

## 2019-06-16 DIAGNOSIS — J18.9 PNEUMONIA, UNSPECIFIED ORGANISM: ICD-10-CM

## 2019-06-16 DIAGNOSIS — E10.22 TYPE 1 DIABETES MELLITUS WITH DIABETIC CHRONIC KIDNEY DISEASE: ICD-10-CM

## 2019-06-16 DIAGNOSIS — I50.22 CHRONIC SYSTOLIC (CONGESTIVE) HEART FAILURE: ICD-10-CM

## 2019-06-16 LAB
ALBUMIN SERPL ELPH-MCNC: 3.7 G/DL — SIGNIFICANT CHANGE UP (ref 3.3–5)
ALP SERPL-CCNC: 69 U/L — SIGNIFICANT CHANGE UP (ref 40–120)
ALT FLD-CCNC: 28 U/L — SIGNIFICANT CHANGE UP (ref 10–45)
ANION GAP SERPL CALC-SCNC: 17 MMOL/L — SIGNIFICANT CHANGE UP (ref 5–17)
AST SERPL-CCNC: 52 U/L — HIGH (ref 10–40)
BASOPHILS # BLD AUTO: 0 K/UL — SIGNIFICANT CHANGE UP (ref 0–0.2)
BASOPHILS NFR BLD AUTO: 0.1 % — SIGNIFICANT CHANGE UP (ref 0–2)
BILIRUB SERPL-MCNC: 0.5 MG/DL — SIGNIFICANT CHANGE UP (ref 0.2–1.2)
BUN SERPL-MCNC: 23 MG/DL — SIGNIFICANT CHANGE UP (ref 7–23)
CALCIUM SERPL-MCNC: 9.4 MG/DL — SIGNIFICANT CHANGE UP (ref 8.4–10.5)
CHLORIDE SERPL-SCNC: 89 MMOL/L — LOW (ref 96–108)
CO2 SERPL-SCNC: 29 MMOL/L — SIGNIFICANT CHANGE UP (ref 22–31)
CREAT SERPL-MCNC: 4.2 MG/DL — HIGH (ref 0.5–1.3)
EOSINOPHIL # BLD AUTO: 0 K/UL — SIGNIFICANT CHANGE UP (ref 0–0.5)
EOSINOPHIL NFR BLD AUTO: 0.1 % — SIGNIFICANT CHANGE UP (ref 0–6)
GLUCOSE BLDC GLUCOMTR-MCNC: 242 MG/DL — HIGH (ref 70–99)
GLUCOSE BLDC GLUCOMTR-MCNC: 260 MG/DL — HIGH (ref 70–99)
GLUCOSE BLDC GLUCOMTR-MCNC: 326 MG/DL — HIGH (ref 70–99)
GLUCOSE BLDC GLUCOMTR-MCNC: 338 MG/DL — HIGH (ref 70–99)
GLUCOSE BLDC GLUCOMTR-MCNC: 414 MG/DL — HIGH (ref 70–99)
GLUCOSE SERPL-MCNC: 284 MG/DL — HIGH (ref 70–99)
HBA1C BLD-MCNC: 8.2 % — HIGH (ref 4–5.6)
HCT VFR BLD CALC: 30.3 % — LOW (ref 34.5–45)
HGB BLD-MCNC: 9.4 G/DL — LOW (ref 11.5–15.5)
LYMPHOCYTES # BLD AUTO: 0.8 K/UL — LOW (ref 1–3.3)
LYMPHOCYTES # BLD AUTO: 10.7 % — LOW (ref 13–44)
MAGNESIUM SERPL-MCNC: 1.9 MG/DL — SIGNIFICANT CHANGE UP (ref 1.6–2.6)
MCHC RBC-ENTMCNC: 31 GM/DL — LOW (ref 32–36)
MCHC RBC-ENTMCNC: 32.3 PG — SIGNIFICANT CHANGE UP (ref 27–34)
MCV RBC AUTO: 104 FL — HIGH (ref 80–100)
MONOCYTES # BLD AUTO: 0.6 K/UL — SIGNIFICANT CHANGE UP (ref 0–0.9)
MONOCYTES NFR BLD AUTO: 8.7 % — SIGNIFICANT CHANGE UP (ref 2–14)
NEUTROPHILS # BLD AUTO: 5.8 K/UL — SIGNIFICANT CHANGE UP (ref 1.8–7.4)
NEUTROPHILS NFR BLD AUTO: 80.3 % — HIGH (ref 43–77)
PHOSPHATE SERPL-MCNC: 4.1 MG/DL — SIGNIFICANT CHANGE UP (ref 2.5–4.5)
PLATELET # BLD AUTO: 224 K/UL — SIGNIFICANT CHANGE UP (ref 150–400)
POTASSIUM SERPL-MCNC: 5.1 MMOL/L — SIGNIFICANT CHANGE UP (ref 3.5–5.3)
POTASSIUM SERPL-SCNC: 5.1 MMOL/L — SIGNIFICANT CHANGE UP (ref 3.5–5.3)
PROT SERPL-MCNC: 6.4 G/DL — SIGNIFICANT CHANGE UP (ref 6–8.3)
RBC # BLD: 2.9 M/UL — LOW (ref 3.8–5.2)
RBC # FLD: 12.6 % — SIGNIFICANT CHANGE UP (ref 10.3–14.5)
SODIUM SERPL-SCNC: 135 MMOL/L — SIGNIFICANT CHANGE UP (ref 135–145)
VANCOMYCIN FLD-MCNC: 17.3 UG/ML — SIGNIFICANT CHANGE UP
WBC # BLD: 7.3 K/UL — SIGNIFICANT CHANGE UP (ref 3.8–10.5)
WBC # FLD AUTO: 7.3 K/UL — SIGNIFICANT CHANGE UP (ref 3.8–10.5)

## 2019-06-16 PROCEDURE — 99222 1ST HOSP IP/OBS MODERATE 55: CPT

## 2019-06-16 RX ORDER — INSULIN LISPRO 100/ML
3 VIAL (ML) SUBCUTANEOUS
Refills: 0 | Status: DISCONTINUED | OUTPATIENT
Start: 2019-06-16 | End: 2019-06-21

## 2019-06-16 RX ORDER — CLOPIDOGREL BISULFATE 75 MG/1
75 TABLET, FILM COATED ORAL DAILY
Refills: 0 | Status: DISCONTINUED | OUTPATIENT
Start: 2019-06-16 | End: 2019-06-20

## 2019-06-16 RX ORDER — INSULIN GLARGINE 100 [IU]/ML
4 INJECTION, SOLUTION SUBCUTANEOUS AT BEDTIME
Refills: 0 | Status: DISCONTINUED | OUTPATIENT
Start: 2019-06-16 | End: 2019-06-17

## 2019-06-16 RX ORDER — METOPROLOL TARTRATE 50 MG
25 TABLET ORAL
Refills: 0 | Status: DISCONTINUED | OUTPATIENT
Start: 2019-06-16 | End: 2019-06-21

## 2019-06-16 RX ORDER — ATORVASTATIN CALCIUM 80 MG/1
20 TABLET, FILM COATED ORAL AT BEDTIME
Refills: 0 | Status: DISCONTINUED | OUTPATIENT
Start: 2019-06-16 | End: 2019-06-21

## 2019-06-16 RX ORDER — INSULIN LISPRO 100/ML
VIAL (ML) SUBCUTANEOUS
Refills: 0 | Status: DISCONTINUED | OUTPATIENT
Start: 2019-06-16 | End: 2019-06-17

## 2019-06-16 RX ORDER — IPRATROPIUM/ALBUTEROL SULFATE 18-103MCG
3 AEROSOL WITH ADAPTER (GRAM) INHALATION EVERY 6 HOURS
Refills: 0 | Status: DISCONTINUED | OUTPATIENT
Start: 2019-06-16 | End: 2019-06-21

## 2019-06-16 RX ORDER — DEXTROSE 50 % IN WATER 50 %
25 SYRINGE (ML) INTRAVENOUS ONCE
Refills: 0 | Status: DISCONTINUED | OUTPATIENT
Start: 2019-06-16 | End: 2019-06-21

## 2019-06-16 RX ORDER — SENNA PLUS 8.6 MG/1
2 TABLET ORAL AT BEDTIME
Refills: 0 | Status: DISCONTINUED | OUTPATIENT
Start: 2019-06-16 | End: 2019-06-21

## 2019-06-16 RX ORDER — INSULIN LISPRO 100/ML
2 VIAL (ML) SUBCUTANEOUS
Refills: 0 | Status: DISCONTINUED | OUTPATIENT
Start: 2019-06-16 | End: 2019-06-16

## 2019-06-16 RX ORDER — INSULIN LISPRO 100/ML
VIAL (ML) SUBCUTANEOUS
Refills: 0 | Status: DISCONTINUED | OUTPATIENT
Start: 2019-06-16 | End: 2019-06-16

## 2019-06-16 RX ORDER — PIPERACILLIN AND TAZOBACTAM 4; .5 G/20ML; G/20ML
3.38 INJECTION, POWDER, LYOPHILIZED, FOR SOLUTION INTRAVENOUS EVERY 12 HOURS
Refills: 0 | Status: DISCONTINUED | OUTPATIENT
Start: 2019-06-16 | End: 2019-06-20

## 2019-06-16 RX ORDER — ASPIRIN/CALCIUM CARB/MAGNESIUM 324 MG
81 TABLET ORAL DAILY
Refills: 0 | Status: DISCONTINUED | OUTPATIENT
Start: 2019-06-16 | End: 2019-06-21

## 2019-06-16 RX ORDER — SODIUM CHLORIDE 9 MG/ML
1000 INJECTION, SOLUTION INTRAVENOUS
Refills: 0 | Status: DISCONTINUED | OUTPATIENT
Start: 2019-06-16 | End: 2019-06-21

## 2019-06-16 RX ORDER — SEVELAMER CARBONATE 2400 MG/1
1600 POWDER, FOR SUSPENSION ORAL
Refills: 0 | Status: DISCONTINUED | OUTPATIENT
Start: 2019-06-16 | End: 2019-06-21

## 2019-06-16 RX ORDER — ACETAMINOPHEN 500 MG
1000 TABLET ORAL ONCE
Refills: 0 | Status: COMPLETED | OUTPATIENT
Start: 2019-06-16 | End: 2019-06-16

## 2019-06-16 RX ORDER — ACETYLCYSTEINE 200 MG/ML
5 VIAL (ML) MISCELLANEOUS
Refills: 0 | Status: COMPLETED | OUTPATIENT
Start: 2019-06-16 | End: 2019-06-17

## 2019-06-16 RX ORDER — HEPARIN SODIUM 5000 [USP'U]/ML
5000 INJECTION INTRAVENOUS; SUBCUTANEOUS EVERY 8 HOURS
Refills: 0 | Status: DISCONTINUED | OUTPATIENT
Start: 2019-06-16 | End: 2019-06-21

## 2019-06-16 RX ORDER — INSULIN LISPRO 100/ML
VIAL (ML) SUBCUTANEOUS AT BEDTIME
Refills: 0 | Status: DISCONTINUED | OUTPATIENT
Start: 2019-06-16 | End: 2019-06-16

## 2019-06-16 RX ORDER — DEXTROSE 50 % IN WATER 50 %
12.5 SYRINGE (ML) INTRAVENOUS ONCE
Refills: 0 | Status: DISCONTINUED | OUTPATIENT
Start: 2019-06-16 | End: 2019-06-21

## 2019-06-16 RX ORDER — DEXTROSE 50 % IN WATER 50 %
15 SYRINGE (ML) INTRAVENOUS ONCE
Refills: 0 | Status: DISCONTINUED | OUTPATIENT
Start: 2019-06-16 | End: 2019-06-21

## 2019-06-16 RX ORDER — INSULIN LISPRO 100/ML
VIAL (ML) SUBCUTANEOUS AT BEDTIME
Refills: 0 | Status: DISCONTINUED | OUTPATIENT
Start: 2019-06-16 | End: 2019-06-21

## 2019-06-16 RX ORDER — DOCUSATE SODIUM 100 MG
100 CAPSULE ORAL THREE TIMES A DAY
Refills: 0 | Status: DISCONTINUED | OUTPATIENT
Start: 2019-06-16 | End: 2019-06-21

## 2019-06-16 RX ADMIN — Medication 25 MILLIGRAM(S): at 05:45

## 2019-06-16 RX ADMIN — Medication 3 MILLILITER(S): at 05:51

## 2019-06-16 RX ADMIN — Medication 81 MILLIGRAM(S): at 11:18

## 2019-06-16 RX ADMIN — PIPERACILLIN AND TAZOBACTAM 25 GRAM(S): 4; .5 INJECTION, POWDER, LYOPHILIZED, FOR SOLUTION INTRAVENOUS at 17:09

## 2019-06-16 RX ADMIN — Medication 5 MILLILITER(S): at 05:45

## 2019-06-16 RX ADMIN — Medication 5 MILLILITER(S): at 17:10

## 2019-06-16 RX ADMIN — Medication 25 MILLIGRAM(S): at 17:10

## 2019-06-16 RX ADMIN — INSULIN GLARGINE 4 UNIT(S): 100 INJECTION, SOLUTION SUBCUTANEOUS at 22:19

## 2019-06-16 RX ADMIN — Medication 2: at 22:19

## 2019-06-16 RX ADMIN — Medication 3 MILLILITER(S): at 17:09

## 2019-06-16 RX ADMIN — PIPERACILLIN AND TAZOBACTAM 25 GRAM(S): 4; .5 INJECTION, POWDER, LYOPHILIZED, FOR SOLUTION INTRAVENOUS at 08:27

## 2019-06-16 RX ADMIN — SEVELAMER CARBONATE 1600 MILLIGRAM(S): 2400 POWDER, FOR SUSPENSION ORAL at 11:45

## 2019-06-16 RX ADMIN — Medication 2 UNIT(S): at 08:32

## 2019-06-16 RX ADMIN — SEVELAMER CARBONATE 1600 MILLIGRAM(S): 2400 POWDER, FOR SUSPENSION ORAL at 17:09

## 2019-06-16 RX ADMIN — Medication 2: at 17:09

## 2019-06-16 RX ADMIN — Medication 2 UNIT(S): at 12:30

## 2019-06-16 RX ADMIN — Medication 400 MILLIGRAM(S): at 23:02

## 2019-06-16 RX ADMIN — Medication 3 MILLILITER(S): at 11:18

## 2019-06-16 RX ADMIN — ATORVASTATIN CALCIUM 20 MILLIGRAM(S): 80 TABLET, FILM COATED ORAL at 22:19

## 2019-06-16 RX ADMIN — CLOPIDOGREL BISULFATE 75 MILLIGRAM(S): 75 TABLET, FILM COATED ORAL at 11:18

## 2019-06-16 RX ADMIN — SEVELAMER CARBONATE 1600 MILLIGRAM(S): 2400 POWDER, FOR SUSPENSION ORAL at 08:27

## 2019-06-16 RX ADMIN — Medication 4: at 08:33

## 2019-06-16 RX ADMIN — Medication 3 MILLILITER(S): at 23:54

## 2019-06-16 RX ADMIN — AZITHROMYCIN 250 MILLIGRAM(S): 500 TABLET, FILM COATED ORAL at 22:20

## 2019-06-16 RX ADMIN — Medication 3 UNIT(S): at 17:09

## 2019-06-16 RX ADMIN — Medication 100 MILLIGRAM(S): at 05:45

## 2019-06-16 RX ADMIN — Medication 2: at 12:30

## 2019-06-16 RX ADMIN — Medication 250 MILLIGRAM(S): at 00:25

## 2019-06-16 NOTE — CONSULT NOTE ADULT - SUBJECTIVE AND OBJECTIVE BOX
HPI:   Patient is a 63y female with    REVIEW OF SYSTEMS:  All other review of systems negative (Comprehensive ROS)    PAST MEDICAL & SURGICAL HISTORY:  COPD (chronic obstructive pulmonary disease)  Localized enlarged lymph nodes  CHF (congestive heart failure): EF 40-45%  Subclavian vein stenosis, left: s/p stent  DKA, type 1: 1/2015  ACS (acute coronary syndrome): 1/2015 - cath revealed 100% ostial stenosis not amenable to PCI - medical management  TIA (transient ischemic attack): x 2 - 8-9 years ago prior to ASD/VSD repair  CAD (coronary artery disease): s/p stents  Gout: past  CVA (cerebral infarction): with no residual, 8 yrs ago, prior to heart surgery - ST memory loss  Peripheral vascular disease: occluded left fem-pop bypass 5/2015  Diabetes mellitus type 1: Insulin Dependent -  ESRD (end stage renal disease): dialysis  M, tue, thursday, saturday  Hyperlipidemia  Status post device closure of ASD: &quot;clamshell&quot;  History of cardiac catheterization: 1/2015 - no intervention  S/P femoral-popliteal bypass surgery: L and R in 2013 with graft; 5/2015 CFA angioplasty left and ileofemoral endarterectomywith vein patch angioplasty of left fem-pop bypass graft  Multiple vascular surgery both leg, left fempop bypass revision 11/2015  AV (arteriovenous fistula): Left AV graft; revision with stent placement 2-3 years ago  S/P cholecystectomy      Allergies    No Known Allergies    Intolerances        Antimicrobials Day #    azithromycin  IVPB 500 milliGRAM(s) IV Intermittent every 24 hours  azithromycin  IVPB      piperacillin/tazobactam IVPB. 3.375 Gram(s) IV Intermittent every 12 hours    Other Medications:  acetylcysteine 10%  Inhalation 5 milliLiter(s) Inhalation two times a day  ALBUTerol/ipratropium for Nebulization 3 milliLiter(s) Nebulizer every 6 hours  aspirin enteric coated 81 milliGRAM(s) Oral daily  atorvastatin 20 milliGRAM(s) Oral at bedtime  clopidogrel Tablet 75 milliGRAM(s) Oral daily  dextrose 40% Gel 15 Gram(s) Oral once PRN  dextrose 5%. 1000 milliLiter(s) IV Continuous <Continuous>  dextrose 50% Injectable 12.5 Gram(s) IV Push once  dextrose 50% Injectable 25 Gram(s) IV Push once  dextrose 50% Injectable 25 Gram(s) IV Push once  docusate sodium 100 milliGRAM(s) Oral three times a day  heparin  Injectable 5000 Unit(s) SubCutaneous every 8 hours  insulin glargine Injectable (LANTUS) 2 Unit(s) SubCutaneous at bedtime  insulin lispro (HumaLOG) corrective regimen sliding scale   SubCutaneous three times a day before meals  insulin lispro (HumaLOG) corrective regimen sliding scale   SubCutaneous at bedtime  insulin lispro Injectable (HumaLOG) 2 Unit(s) SubCutaneous three times a day before meals  metoprolol tartrate 25 milliGRAM(s) Oral two times a day  senna 2 Tablet(s) Oral at bedtime PRN  sevelamer carbonate 1600 milliGRAM(s) Oral three times a day with meals      FAMILY HISTORY:  Family history of smoking  Family history of hypertension  Family history of cancer (Sibling)      SOCIAL HISTORY:  Smoking:     ETOH:     Drug Use:     Single     T(F): 98.2 (06-16-19 @ 05:42), Max: 102.4 (06-15-19 @ 20:16)  HR: 92 (06-16-19 @ 05:42)  BP: 105/58 (06-16-19 @ 05:42)  RR: 18 (06-16-19 @ 05:42)  SpO2: 96% (06-16-19 @ 05:42)  Wt(kg): --    PHYSICAL EXAM:  General: alert, no acute distress  Eyes:  anicteric, no conjunctival injection, no discharge  Oropharynx: no lesions or injection 	  Neck: supple, without adenopathy  Lungs: clear to auscultation  Heart: regular rate and rhythm; no murmur, rubs or gallops  Abdomen: soft, nondistended, nontender, without mass or organomegaly  Skin: no lesions  Extremities: no clubbing, cyanosis, or edema  Neurologic: alert, oriented, moves all extremities    LAB RESULTS:                        9.4    7.3   )-----------( 224      ( 16 Jun 2019 07:38 )             30.3     06-16    135  |  89<L>  |  23  ----------------------------<  284<H>  5.1   |  29  |  4.20<H>    Ca    9.4      16 Jun 2019 07:11  Phos  4.1     06-16  Mg     1.9     06-16    TPro  6.4  /  Alb  3.7  /  TBili  0.5  /  DBili  x   /  AST  52<H>  /  ALT  28  /  AlkPhos  69  06-16    LIVER FUNCTIONS - ( 16 Jun 2019 07:11 )  Alb: 3.7 g/dL / Pro: 6.4 g/dL / ALK PHOS: 69 U/L / ALT: 28 U/L / AST: 52 U/L / GGT: x               MICROBIOLOGY REVIEWED:    RADIOLOGY REVIEWED: HPI:   Patient is a 63y female with hx CAD, ESRD on HD, CHF, severesAS, COPD, PVD s/p b/l fenm pop bypass, hx hilar adenopathy, recent hospitalizations for DKA, cough for 2 weeks, occasional fevers and chills. CRX with new patchy L basilar opasity. Pt now started on IV Zosyn. She feels better today, still SOB at baseline, no fevers today    REVIEW OF SYSTEMS:  All other review of systems negative (Comprehensive ROS)    PAST MEDICAL & SURGICAL HISTORY:  COPD (chronic obstructive pulmonary disease)  Localized enlarged lymph nodes  CHF (congestive heart failure): EF 40-45%  Subclavian vein stenosis, left: s/p stent  DKA, type 1: 1/2015  ACS (acute coronary syndrome): 1/2015 - cath revealed 100% ostial stenosis not amenable to PCI - medical management  TIA (transient ischemic attack): x 2 - 8-9 years ago prior to ASD/VSD repair  CAD (coronary artery disease): s/p stents  Gout: past  CVA (cerebral infarction): with no residual, 8 yrs ago, prior to heart surgery - ST memory loss  Peripheral vascular disease: occluded left fem-pop bypass 5/2015  Diabetes mellitus type 1: Insulin Dependent -  ESRD (end stage renal disease): dialysis  M, tue, thursday, saturday  Hyperlipidemia  Status post device closure of ASD: &quot;[place]&quot;  History of cardiac catheterization: 1/2015 - no intervention  S/P femoral-popliteal bypass surgery: L and R in 2013 with graft; 5/2015 CFA angioplasty left and ileofemoral endarterectomywith vein patch angioplasty of left fem-pop bypass graft  Multiple vascular surgery both leg, left fempop bypass revision 11/2015  AV (arteriovenous fistula): Left AV graft; revision with stent placement 2-3 years ago  S/P cholecystectomy      Allergies    No Known Allergies    Intolerances        Antimicrobials Day #    azithromycin  IVPB 500 milliGRAM(s) IV Intermittent every 24 hours  azithromycin  IVPB      piperacillin/tazobactam IVPB. 3.375 Gram(s) IV Intermittent every 12 hours    Other Medications:  acetylcysteine 10%  Inhalation 5 milliLiter(s) Inhalation two times a day  ALBUTerol/ipratropium for Nebulization 3 milliLiter(s) Nebulizer every 6 hours  aspirin enteric coated 81 milliGRAM(s) Oral daily  atorvastatin 20 milliGRAM(s) Oral at bedtime  clopidogrel Tablet 75 milliGRAM(s) Oral daily  dextrose 40% Gel 15 Gram(s) Oral once PRN  dextrose 5%. 1000 milliLiter(s) IV Continuous <Continuous>  dextrose 50% Injectable 12.5 Gram(s) IV Push once  dextrose 50% Injectable 25 Gram(s) IV Push once  dextrose 50% Injectable 25 Gram(s) IV Push once  docusate sodium 100 milliGRAM(s) Oral three times a day  heparin  Injectable 5000 Unit(s) SubCutaneous every 8 hours  insulin glargine Injectable (LANTUS) 2 Unit(s) SubCutaneous at bedtime  insulin lispro (HumaLOG) corrective regimen sliding scale   SubCutaneous three times a day before meals  insulin lispro (HumaLOG) corrective regimen sliding scale   SubCutaneous at bedtime  insulin lispro Injectable (HumaLOG) 2 Unit(s) SubCutaneous three times a day before meals  metoprolol tartrate 25 milliGRAM(s) Oral two times a day  senna 2 Tablet(s) Oral at bedtime PRN  sevelamer carbonate 1600 milliGRAM(s) Oral three times a day with meals      FAMILY HISTORY:  Family history of smoking  Family history of hypertension  Family history of cancer (Sibling)      SOCIAL HISTORY:  Smoking:     ETOH:     Drug Use:     Single     T(F): 98.2 (06-16-19 @ 05:42), Max: 102.4 (06-15-19 @ 20:16)  HR: 92 (06-16-19 @ 05:42)  BP: 105/58 (06-16-19 @ 05:42)  RR: 18 (06-16-19 @ 05:42)  SpO2: 96% (06-16-19 @ 05:42)  Wt(kg): --    PHYSICAL EXAM:  General: alert, no acute distress  Eyes:  anicteric, no conjunctival injection, no discharge  Oropharynx: no lesions or injection 	  Neck: supple, without adenopathy  Lungs: diminished at bases  Heart: regular rate and rhythm; no murmur, rubs or gallops  Abdomen: soft, nondistended, nontender, without mass or organomegaly  Skin: no lesions  Extremities: no clubbing, cyanosis, or edema  Neurologic: alert, oriented, moves all extremities    LAB RESULTS:                        9.4    7.3   )-----------( 224      ( 16 Jun 2019 07:38 )             30.3     06-16    135  |  89<L>  |  23  ----------------------------<  284<H>  5.1   |  29  |  4.20<H>    Ca    9.4      16 Jun 2019 07:11  Phos  4.1     06-16  Mg     1.9     06-16    TPro  6.4  /  Alb  3.7  /  TBili  0.5  /  DBili  x   /  AST  52<H>  /  ALT  28  /  AlkPhos  69  06-16    LIVER FUNCTIONS - ( 16 Jun 2019 07:11 )  Alb: 3.7 g/dL / Pro: 6.4 g/dL / ALK PHOS: 69 U/L / ALT: 28 U/L / AST: 52 U/L / GGT: x               MICROBIOLOGY REVIEWED:    RADIOLOGY REVIEWED:  < from: CT Abdomen and Pelvis No Cont (06.15.19 @ 21:57) >  New left lower lobe and lingular lung consolidations and patchy nodular   airspace opacities in the right lower lobe concerning for multifocal   pneumonia.    Intrahepatic and extrahepatic biliary ductal dilatation, unchanged.    < end of copied text >

## 2019-06-16 NOTE — CONSULT NOTE ADULT - SUBJECTIVE AND OBJECTIVE BOX
[facility]  [phone]  NEPHROLOGY      INITIAL CONSULT NOTE  --------------------------------------------------------------------------------  HPI:      63F c hx CAD (Cath Feb '19 showing 99% RCA, 100% RPLS, 100% Cx) s/p multiple stents/brachytherapy, ESRD (on HD M/T/T/Sa), DM1 c/b neuropathy and retinopathy, CHF (EF 30% per last [facility]), mod MR, severe AS, RHF, TIA, severe PVD s/p b/l fempop bypass, left subclavian vein stenosis s/p stent, COPD not on O2, gout, hilar lymphadenopathy of unknown etiology, poor medical mgmt at home, frequent hospitalizations (usually 1-3 times a month) for DKA, hyperglycemia, PNA, recently discharged 3 days ago for hyperkalemia, pw SOB, coughing for 2 weeks.. Pt also reports fevers and chills for 1 day. in er febrile to 100.5F  In the ED, received lantus 2U, regular insulin 5U, duoneb, tylenol, azithro, vanc, zosyn.    ISTOP Reference #: [id number]  Rx Written	Rx Dispensed	Drug	Quantity	Days Supply	Prescriber Name  04/17/2019	04/17/2019	oxycodone-acetaminophen 5-325 mg tab	60	30	Tee Biswas MD  03/05/2019	03/05/2019	oxycodone hcl 5 mg tablet	12	2	Zuly Osuna (15 Christian 2019 23:55)      PAST HISTORY  --------------------------------------------------------------------------------  PAST MEDICAL & SURGICAL HISTORY:  COPD (chronic obstructive pulmonary disease)  Localized enlarged lymph nodes  CHF (congestive heart failure): EF 40-45%  Subclavian vein stenosis, left: s/p stent  DKA, type 1: 1/2015  ACS (acute coronary syndrome): 1/2015 - cath revealed 100% ostial stenosis not amenable to PCI - medical management  TIA (transient ischemic attack): x 2 - 8-9 years ago prior to ASD/VSD repair  CAD (coronary artery disease): s/p stents  Gout: past  CVA (cerebral infarction): with no residual, 8 yrs ago, prior to heart surgery - ST memory loss  Peripheral vascular disease: occluded left fem-pop bypass 5/2015  Diabetes mellitus type 1: Insulin Dependent -  ESRD (end stage renal disease): dialysis  M, tue, thursday, saturday  Hyperlipidemia  Status post device closure of ASD: &quot;clamshell&quot;  History of cardiac catheterization: 1/2015 - no intervention  S/P femoral-popliteal bypass surgery: L and R in 2013 with graft; 5/2015 CFA angioplasty left and ileofemoral endarterectomywith vein patch angioplasty of left fem-pop bypass graft  Multiple vascular surgery both leg, left fempop bypass revision 11/2015  AV (arteriovenous fistula): Left AV graft; revision with stent placement 2-3 years ago  S/P cholecystectomy    FAMILY HISTORY:  Family history of smoking  Family history of hypertension  Family history of cancer (Sibling)    PAST SOCIAL HISTORY:  past tobacco use      ALLERGIES & MEDICATIONS  --------------------------------------------------------------------------------  Allergies    No Known Allergies    Intolerances      Standing Inpatient Medications  acetylcysteine 10%  Inhalation 5 milliLiter(s) Inhalation two times a day  ALBUTerol/ipratropium for Nebulization 3 milliLiter(s) Nebulizer every 6 hours  aspirin enteric coated 81 milliGRAM(s) Oral daily  atorvastatin 20 milliGRAM(s) Oral at bedtime  azithromycin  IVPB 500 milliGRAM(s) IV Intermittent every 24 hours  azithromycin  IVPB      clopidogrel Tablet 75 milliGRAM(s) Oral daily  dextrose 5%. 1000 milliLiter(s) IV Continuous <Continuous>  dextrose 50% Injectable 12.5 Gram(s) IV Push once  dextrose 50% Injectable 25 Gram(s) IV Push once  dextrose 50% Injectable 25 Gram(s) IV Push once  docusate sodium 100 milliGRAM(s) Oral three times a day  heparin  Injectable 5000 Unit(s) SubCutaneous every 8 hours  insulin glargine Injectable (LANTUS) 4 Unit(s) SubCutaneous at bedtime  insulin lispro (HumaLOG) corrective regimen sliding scale   SubCutaneous three times a day before meals  insulin lispro (HumaLOG) corrective regimen sliding scale   SubCutaneous at bedtime  insulin lispro Injectable (HumaLOG) 3 Unit(s) SubCutaneous three times a day before meals  metoprolol tartrate 25 milliGRAM(s) Oral two times a day  piperacillin/tazobactam IVPB. 3.375 Gram(s) IV Intermittent every 12 hours  sevelamer carbonate 1600 milliGRAM(s) Oral three times a day with meals    PRN Inpatient Medications  dextrose 40% Gel 15 Gram(s) Oral once PRN  senna 2 Tablet(s) Oral at bedtime PRN      REVIEW OF SYSTEMS  --------------------------------------------------------------------------------  Gen: +  fevers/chills   Skin: No rashes  Head/Eyes/Ears/Mouth: No headache; Normal hearing;  No sinus pain/discomfort, sore throat + decrease vision   Respiratory: + dyspnea, + cough,  + wheezing, hemoptysis -   CV: No chest pain, or palp   GI: No abdominal pain, diarrhea, nausea, vomiting, melena  : No dysuria,  + decrease urination   MSK: No joint pain/swelling; no back pain  Neuro: No dizziness/lightheadedness,  also with no edema     All other systems were reviewed and are negative, except as noted.    VITALS/PHYSICAL EXAM  --------------------------------------------------------------------------------  T(C): 36.6 (06-16-19 @ 20:57), Max: 37.4 (06-16-19 @ 05:03)  HR: 83 (06-16-19 @ 20:57) (78 - 99)  BP: 115/61 (06-16-19 @ 20:57) (94/53 - 115/61)  RR: 18 (06-16-19 @ 20:57) (18 - 20)  SpO2: 98% (06-16-19 @ 20:57) (95% - 100%)  Wt(kg): --  Height (cm): 160.02 (06-15-19 @ 20:16)  Weight (kg): 54.4 (06-15-19 @ 20:16)  BMI (kg/m2): 21.2 (06-15-19 @ 20:16)  BSA (m2): 1.56 (06-15-19 @ 20:16)      06-15-19 @ 07:01  -  06-16-19 @ 07:00  --------------------------------------------------------  IN: 120 mL / OUT: 0 mL / NET: 120 mL      Physical Exam:  	Gen: remains thin and ill appearing   	no jvd ,  	Pulm: decrease bs  no rales  + ronchi   + wheezing  	CV: RRR, S1S2; no rub  	Back: No CVA tenderness; no sacral edema  	Abd: +BS, soft, nontender/nondistended  	: No suprapubic tenderness  	UE: Warm, no cyanosis  no clubbing,  no edema;  	LE: Warm, no cyanosis  no clubbing, 1+  edema  	Neuro: alert and oriented. speech coherent  but tone soft   	Skin: Warm, no decrease skin turgor   	Vascular access: + avf + bruit and thrill     LABS/STUDIES  --------------------------------------------------------------------------------              9.4    7.3   >-----------<  224      [06-16-19 @ 07:38]              30.3     135  |  89  |  23  ----------------------------<  284      [06-16-19 @ 07:11]  5.1   |  29  |  4.20        Ca     9.4     [06-16-19 @ 07:11]      Mg     1.9     [06-16-19 @ 07:11]      Phos  4.1     [06-16-19 @ 07:11]    TPro  6.4  /  Alb  3.7  /  TBili  0.5  /  DBili  x   /  AST  52  /  ALT  28  /  AlkPhos  69  [06-16-19 @ 07:11]    PT/INR: PT 11.9 , INR 1.04       [06-15-19 @ 21:03]  PTT: 27.8       [06-15-19 @ 21:03]    Serum Osmolality 288      [06-15-19 @ 21:03]    Creatinine Trend:  SCr 4.20 [06-16 @ 07:11]  SCr 3.45 [06-15 @ 21:03]  SCr 4.69 [06-12 @ 06:13]  SCr 2.81 [06-11 @ 07:04]  SCr 2.79 [06-11 @ 07:01]    Urinalysis - [06-04-17 @ 08:24]      Color Yellow / Appearance Clear / SG 1.013 / pH 8.5      Gluc 1000 / Ketone Negative  / Bili Negative / Urobili Negative       Blood Negative / Protein >600 / Leuk Est Small / Nitrite Negative      RBC 3-5 / WBC 6-10 / Hyaline  / Gran  / Sq Epi  / Non Sq Epi OCC / Bacteria       PTH -- (Ca 7.8)      [03-29-19 @ 20:38]   73  PTH -- (Ca 7.3)      [02-22-19 @ 02:49]   59  HbA1c 8.2      [06-16-19 @ 13:24]  TSH 0.71      [11-17-18 @ 08:25]    HBsAb <3.0      [06-02-19 @ 00:32]  HBsAb Nonreact      [03-26-19 @ 16:04]  HBsAg Nonreact      [06-02-19 @ 00:32]  HBcAb Nonreact      [03-26-19 @ 16:04]  HCV 0.12, Nonreact      [06-02-19 @ 00:32]    < from: Xray Chest 1 View-PORTABLE IMMEDIATE (06.15.19 @ 22:06) >  EXAM:  XR CHEST PORTABLE IMMED 1V                            PROCEDURE DATE:  06/15/2019            INTERPRETATION:  CLINICAL INDICATION: Sepsis.    EXAM: Frontal view of the chest with comparison made to chest radiograph   on 6/10/2019    FINDINGS:   There is a left subclavian vascular mesh stent.  New patchy left basilar opacity compatible with pneumonia. There are no   pleural effusions or pneumothorax.  The bones are unremarkable.    IMPRESSION:   New patchy left basilar opacity compatible with pneumonia.      < end of copied text >

## 2019-06-16 NOTE — CONSULT NOTE ADULT - SUBJECTIVE AND OBJECTIVE BOX
Reason For Consult:     HPI: 63F c hx CAD (Cath Feb '19 showing 99% RCA, 100% RPLS, 100% Cx) s/p multiple stents/brachytherapy, ESRD (on HD M/T/T/Sa), DM1 c/b neuropathy and retinopathy, CHF (EF 30% per last [facility]), mod MR, severe AS, RHF, TIA, severe PVD s/p b/l fempop bypass, left subclavian vein stenosis s/p stent, COPD not on O2, gout, hilar lymphadenopathy of unknown etiology, poor medical mgmt at home, frequent hospitalizations (usually 1-3 times a month) for DKA, hyperglycemia, PNA, recently discharged 3 days ago for hyperglycemia, pw SOB, coughing for 2 weeks.    Pt is arousable, answers simple questions only, refusing to answer questions in detail, and generally refusing to talk. At baseline, pt is able to ambulate short distances with a cane. Pt reports having her usual HD session 06/15. Pt reports she is here in the hospital for SOB and that she's been coughing for 2 weeks. Pt also reports fevers and chills for 1 day. Pt denies pain anywhere else, and refusing to talk father.    VS: Tm 100.5, P 99, Bp 105/56, R 25, 96% on 4L  In the ED, received lantus 2U, regular insulin 5U, duoneb, tylenol, azithro, vanc, zosyn.    Endocrine History:  Patient has T1DM with retinopathy, neuropathy, and ESRD on HD. She is well known to our service as she has had multiple admission for DKA/hyperglycemia. She was previously on an insulin pump but this was discontinued by our team due to her frequent mismanagement of her insulin pump. She is now on basal bolus insulin. Her endocrinologist is Dr. Pina Ley, unclear when her last visit with Dr. Ley was. Patient states that she takes Levemir 3-4 units in the evening and Humalog 3 units before meals prior to last hospital admission 06/11 when she came in for hyperglycemia and /542 on admission.  Unclear what she ate as well. She states that she eats a lot and usually eats 4-5 times a day. Prior to discharge on 06/12, FS was 131 while on Lantus 4 units QHS and Humalog 3 units TID AC and she was discharged home on same doses. Since hospital discharge on 06/12/19:     She lives with her  at home. She has blurry vision, no polyuria or polydipsia.       PAST MEDICAL & SURGICAL HISTORY:  COPD (chronic obstructive pulmonary disease)  Localized enlarged lymph nodes  CHF (congestive heart failure): EF 40-45%  Subclavian vein stenosis, left: s/p stent  DKA, type 1: 1/2015  ACS (acute coronary syndrome): 1/2015 - cath revealed 100% ostial stenosis not amenable to PCI - medical management  TIA (transient ischemic attack): x 2 - 8-9 years ago prior to ASD/VSD repair  CAD (coronary artery disease): s/p stents  Gout: past  CVA (cerebral infarction): with no residual, 8 yrs ago, prior to heart surgery - ST memory loss  Peripheral vascular disease: occluded left fem-pop bypass 5/2015  Diabetes mellitus type 1: Insulin Dependent -  ESRD (end stage renal disease): dialysis  M, tue, thursday, saturday  Hyperlipidemia  Status post device closure of ASD: &quot;clamshell&quot;  History of cardiac catheterization: 1/2015 - no intervention  S/P femoral-popliteal bypass surgery: L and R in 2013 with graft; 5/2015 CFA angioplasty left and ileofemoral endarterectomywith vein patch angioplasty of left fem-pop bypass graft  Multiple vascular surgery both leg, left fempop bypass revision 11/2015  AV (arteriovenous fistula): Left AV graft; revision with stent placement 2-3 years ago  S/P cholecystectomy      FAMILY HISTORY:  Family history of smoking  Family history of hypertension  Family history of cancer (Sibling)      Social History:    Outpatient Medications:  Home Medications:   * Incomplete Medication History as of 16-Jun-2019 01:15 documented in Structured Notes  · 	furosemide 20 mg oral tablet: Last Dose Taken:  , 1 tab(s) orally 3 times a week   · 	Multiple Vitamins oral tablet: Last Dose Taken:  , 1 tab(s) orally once a day  · 	hydrALAZINE 25 mg oral tablet: Last Dose Taken:  , 1 tab(s) orally 3 times a day  · 	sevelamer carbonate 800 mg oral tablet: Last Dose Taken:  , 2 tab(s) orally 3 times a day (with meals)  · 	metoprolol succinate 50 mg oral tablet, extended release: Last Dose Taken:  , 1 tab(s) orally once a day  · 	clopidogrel 75 mg oral tablet: Last Dose Taken:  , 1 tab(s) orally once a day  · 	insulin lispro 100 units/mL injectable solution: Last Dose Taken:  , 3 unit(s) injectable 3 times a day  · 	lisinopril 40 mg oral tablet: Last Dose Taken:  , 1 tab(s) orally once a day  · 	aspirin 81 mg oral delayed release tablet: Last Dose Taken:  , 1 tab(s) orally once a day  · 	Percocet 5/325 oral tablet: Last Dose Taken:  , 2 tab(s) orally once a day (at bedtime), As Needed  · 	atorvastatin 20 mg oral tablet: Last Dose Taken:  , 1 tab(s) orally once a day  · 	Lantus Solostar Pen 100 units/mL subcutaneous solution: Last Dose Taken:  , 3-4 unit(s) subcutaneous once a day (at bedtime)    MEDICATIONS  (STANDING):  acetylcysteine 10%  Inhalation 5 milliLiter(s) Inhalation two times a day  ALBUTerol/ipratropium for Nebulization 3 milliLiter(s) Nebulizer every 6 hours  aspirin enteric coated 81 milliGRAM(s) Oral daily  atorvastatin 20 milliGRAM(s) Oral at bedtime  azithromycin  IVPB 500 milliGRAM(s) IV Intermittent every 24 hours  azithromycin  IVPB      clopidogrel Tablet 75 milliGRAM(s) Oral daily  dextrose 5%. 1000 milliLiter(s) (50 mL/Hr) IV Continuous <Continuous>  dextrose 50% Injectable 12.5 Gram(s) IV Push once  dextrose 50% Injectable 25 Gram(s) IV Push once  dextrose 50% Injectable 25 Gram(s) IV Push once  docusate sodium 100 milliGRAM(s) Oral three times a day  heparin  Injectable 5000 Unit(s) SubCutaneous every 8 hours  insulin glargine Injectable (LANTUS) 2 Unit(s) SubCutaneous at bedtime  insulin lispro (HumaLOG) corrective regimen sliding scale   SubCutaneous three times a day before meals  insulin lispro (HumaLOG) corrective regimen sliding scale   SubCutaneous at bedtime  insulin lispro Injectable (HumaLOG) 2 Unit(s) SubCutaneous three times a day before meals  metoprolol tartrate 25 milliGRAM(s) Oral two times a day  piperacillin/tazobactam IVPB. 3.375 Gram(s) IV Intermittent every 12 hours  sevelamer carbonate 1600 milliGRAM(s) Oral three times a day with meals    MEDICATIONS  (PRN):  dextrose 40% Gel 15 Gram(s) Oral once PRN Blood Glucose LESS THAN 70 milliGRAM(s)/deciliter  senna 2 Tablet(s) Oral at bedtime PRN Constipation      Allergies  No Known Allergies      Review of Systems:  Constitutional: No fever  Eyes: No blurry vision  Neuro: No tremors  HEENT: No pain  Cardiovascular: No chest pain, palpitations  Respiratory: No SOB, no cough  GI: No nausea, vomiting, abdominal pain  : No dysuria  Skin: no rash  Psych: no depression  Endocrine: no polyuria, polydipsia  Hem/lymph: no swelling  Osteoporosis: no fractures    ALL OTHER SYSTEMS REVIEWED AND NEGATIVE      PHYSICAL EXAM:  VITALS: T(C): 36.8 (06-16-19 @ 11:15)  T(F): 98.2 (06-16-19 @ 11:15), Max: 102.4 (06-15-19 @ 20:16)  HR: 78 (06-16-19 @ 11:15) (78 - 99)  BP: 107/57 (06-16-19 @ 11:15) (94/53 - 145/76)  RR:  (18 - 25)  SpO2:  (95% - 100%)  Wt(kg): 54 kg    GENERAL: NAD, well-groomed, well-developed  EYES: No proptosis, no lid lag, anicteric  HEENT:  Atraumatic, Normocephalic, moist mucous membranes  THYROID: Normal size, no palpable nodules  RESPIRATORY: Clear to auscultation bilaterally; No rales, rhonchi, wheezing, or rubs  CARDIOVASCULAR: Regular rate and rhythm; No murmurs; no peripheral edema  GI: Soft, nontender, non distended, normal bowel sounds  SKIN: Dry, intact, No rashes or lesions  MUSCULOSKELETAL: Full range of motion, normal strength  NEURO: sensation intact, extraocular movements intact, no tremor, normal reflexes  PSYCH: Alert and oriented x 3, normal affect, normal mood  CUSHING'S SIGNS: no striae    POCT Blood Glucose.: 242 mg/dL (06-16-19 @ 11:54) H2+2  POCT Blood Glucose.: 338 mg/dL (06-16-19 @ 08:21) H2+4  POCT Blood Glucose.: 414 mg/dL (06-16-19 @ 08:13)    POCT Blood Glucose.: 278 mg/dL (06-15-19 @ 23:17) L2  POCT Blood Glucose.: 276 mg/dL (06-15-19 @ 22:36)  POCT Blood Glucose.: 226 mg/dL (06-15-19 @ 20:35)                            9.4    7.3   )-----------( 224      ( 16 Jun 2019 07:38 )             30.3       06-16    135  |  89<L>  |  23  ----------------------------<  284<H>  5.1   |  29  |  4.20<H>    EGFR if : 12<L>  EGFR if non : 11<L>    Ca    9.4      06-16  Mg     1.9     06-16  Phos  4.1     06-16    TPro  6.4  /  Alb  3.7  /  TBili  0.5  /  DBili  x   /  AST  52<H>  /  ALT  28  /  AlkPhos  69  06-16    Hemoglobin A1C, Whole Blood: 8.8 % <H> [4.0 - 5.6] (04-30-19 @ 08:58) Reason For Consult:     HPI: 63F c hx CAD (Cath Feb '19 showing 99% RCA, 100% RPLS, 100% Cx) s/p multiple stents/brachytherapy, ESRD (on HD M/T/T/Sa), DM1 c/b neuropathy and retinopathy, CHF (EF 30% per last [facility]), mod MR, severe AS, RHF, TIA, severe PVD s/p b/l fempop bypass, left subclavian vein stenosis s/p stent, COPD not on O2, gout, hilar lymphadenopathy of unknown etiology, poor medical mgmt at home, frequent hospitalizations (usually 1-3 times a month) for DKA, hyperglycemia, PNA, recently discharged 3 days ago for hyperglycemia, pw SOB, coughing for 2 weeks.    Pt is arousable, answers simple questions only, refusing to answer questions in detail, and generally refusing to talk. At baseline, pt is able to ambulate short distances with a cane. Pt reports having her usual HD session 06/15. Pt reports she is here in the hospital for SOB and that she's been coughing for 2 weeks. Pt also reports fevers and chills for 1 day. Pt denies pain anywhere else, and refusing to talk father.    VS: Tm 100.5, P 99, Bp 105/56, R 25, 96% on 4L  In the ED, received lantus 2U, regular insulin 5U, duoneb, tylenol, azithro, vanc, zosyn.    Endocrine History:  Patient has T1DM with retinopathy, neuropathy, and ESRD on HD. She is well known to our service as she has had multiple admission for DKA/hyperglycemia. She was previously on an insulin pump but this was discontinued by our team due to her frequent mismanagement of her insulin pump. She is now on basal bolus insulin. Her endocrinologist is Dr. Pina Ley, last visit was 3 months ago and next visit is scheduled for 2 weeks. Patient states that she takes Levemir 3-4 units in the evening and Humalog 3 units before meals prior to last hospital admission 06/11 when she came in for hyperglycemia and /542 on admission.  Unclear what she ate as well. She states that she eats a lot and usually eats 4-5 times a day. Prior to discharge on 06/12, FS was 131 while on Lantus 4 units QHS and Humalog 3 units TID AC and she was discharged home on same doses. Since hospital discharge on 06/12/19: she admits to doing Lantus 4 units QHS and Humalog 3 units TID AC. She eats 6 meals a day: can be rice, burgers, vegetables, sheila crackers, diet soda. No juices. She admits to checking FS 6x/day and stated FS mostly 200s and occ hits 300 or 400s. She admits to no recent hypos since last week but admits that she has hypoglycemia unawareness and FS has been in the 20s in the past.    She lives with her  at home. She has blurry vision, no polyuria or polydipsia.       PAST MEDICAL & SURGICAL HISTORY:  COPD (chronic obstructive pulmonary disease)  Localized enlarged lymph nodes  CHF (congestive heart failure): EF 40-45%  Subclavian vein stenosis, left: s/p stent  DKA, type 1: 1/2015  ACS (acute coronary syndrome): 1/2015 - cath revealed 100% ostial stenosis not amenable to PCI - medical management  TIA (transient ischemic attack): x 2 - 8-9 years ago prior to ASD/VSD repair  CAD (coronary artery disease): s/p stents  Gout: past  CVA (cerebral infarction): with no residual, 8 yrs ago, prior to heart surgery - ST memory loss  Peripheral vascular disease: occluded left fem-pop bypass 5/2015  Diabetes mellitus type 1: Insulin Dependent -  ESRD (end stage renal disease): dialysis  M, tue, thursday, saturday  Hyperlipidemia  Status post device closure of ASD: &quot;[place]&quot;  History of cardiac catheterization: 1/2015 - no intervention  S/P femoral-popliteal bypass surgery: L and R in 2013 with graft; 5/2015 CFA angioplasty left and ileofemoral endarterectomywith vein patch angioplasty of left fem-pop bypass graft  Multiple vascular surgery both leg, left fempop bypass revision 11/2015  AV (arteriovenous fistula): Left AV graft; revision with stent placement 2-3 years ago  S/P cholecystectomy      FAMILY HISTORY:  Family history of smoking  Family history of hypertension  Family history of cancer (Sibling)      Social History: NO tobacco or alcohol.     Outpatient Medications:  Home Medications:   * Incomplete Medication History as of 16-Jun-2019 01:15 documented in Structured Notes  · 	furosemide 20 mg oral tablet: Last Dose Taken:  , 1 tab(s) orally 3 times a week   · 	Multiple Vitamins oral tablet: Last Dose Taken:  , 1 tab(s) orally once a day  · 	hydrALAZINE 25 mg oral tablet: Last Dose Taken:  , 1 tab(s) orally 3 times a day  · 	sevelamer carbonate 800 mg oral tablet: Last Dose Taken:  , 2 tab(s) orally 3 times a day (with meals)  · 	metoprolol succinate 50 mg oral tablet, extended release: Last Dose Taken:  , 1 tab(s) orally once a day  · 	clopidogrel 75 mg oral tablet: Last Dose Taken:  , 1 tab(s) orally once a day  · 	insulin lispro 100 units/mL injectable solution: Last Dose Taken:  , 3 unit(s) injectable 3 times a day  · 	lisinopril 40 mg oral tablet: Last Dose Taken:  , 1 tab(s) orally once a day  · 	aspirin 81 mg oral delayed release tablet: Last Dose Taken:  , 1 tab(s) orally once a day  · 	Percocet 5/325 oral tablet: Last Dose Taken:  , 2 tab(s) orally once a day (at bedtime), As Needed  · 	atorvastatin 20 mg oral tablet: Last Dose Taken:  , 1 tab(s) orally once a day  · 	Lantus Solostar Pen 100 units/mL subcutaneous solution: Last Dose Taken:  , 3-4 unit(s) subcutaneous once a day (at bedtime)    MEDICATIONS  (STANDING):  acetylcysteine 10%  Inhalation 5 milliLiter(s) Inhalation two times a day  ALBUTerol/ipratropium for Nebulization 3 milliLiter(s) Nebulizer every 6 hours  aspirin enteric coated 81 milliGRAM(s) Oral daily  atorvastatin 20 milliGRAM(s) Oral at bedtime  azithromycin  IVPB 500 milliGRAM(s) IV Intermittent every 24 hours  azithromycin  IVPB      clopidogrel Tablet 75 milliGRAM(s) Oral daily  dextrose 5%. 1000 milliLiter(s) (50 mL/Hr) IV Continuous <Continuous>  dextrose 50% Injectable 12.5 Gram(s) IV Push once  dextrose 50% Injectable 25 Gram(s) IV Push once  dextrose 50% Injectable 25 Gram(s) IV Push once  docusate sodium 100 milliGRAM(s) Oral three times a day  heparin  Injectable 5000 Unit(s) SubCutaneous every 8 hours  insulin glargine Injectable (LANTUS) 2 Unit(s) SubCutaneous at bedtime  insulin lispro (HumaLOG) corrective regimen sliding scale   SubCutaneous three times a day before meals  insulin lispro (HumaLOG) corrective regimen sliding scale   SubCutaneous at bedtime  insulin lispro Injectable (HumaLOG) 2 Unit(s) SubCutaneous three times a day before meals  metoprolol tartrate 25 milliGRAM(s) Oral two times a day  piperacillin/tazobactam IVPB. 3.375 Gram(s) IV Intermittent every 12 hours  sevelamer carbonate 1600 milliGRAM(s) Oral three times a day with meals    MEDICATIONS  (PRN):  dextrose 40% Gel 15 Gram(s) Oral once PRN Blood Glucose LESS THAN 70 milliGRAM(s)/deciliter  senna 2 Tablet(s) Oral at bedtime PRN Constipation      Allergies  No Known Allergies      Review of Systems:  Constitutional: + fever, chills  Eyes: + blurry vision and has retinopathy  Neuro: No tremors  HEENT: No pain  Cardiovascular: No chest pain, palpitations  Respiratory: + SOB, + cough  GI: No nausea, vomiting, abdominal pain  : No dysuria  Skin: no rash  Psych: no depression  Endocrine: no polyuria, polydipsia  Hem/lymph: no swelling  Osteoporosis: no fractures    ALL OTHER SYSTEMS REVIEWED AND NEGATIVE      PHYSICAL EXAM:  VITALS: T(C): 36.8 (06-16-19 @ 11:15)  T(F): 98.2 (06-16-19 @ 11:15), Max: 102.4 (06-15-19 @ 20:16)  HR: 78 (06-16-19 @ 11:15) (78 - 99)  BP: 107/57 (06-16-19 @ 11:15) (94/53 - 145/76)  RR:  (18 - 25)  SpO2:  (95% - 100%)  Wt(kg): 54 kg    GENERAL: NAD, thin female, appears fatigued  EYES: No proptosis, no lid lag, anicteric  HEENT:  Atraumatic, Normocephalic, moist mucous membranes  THYROID: Normal size, no palpable nodules  RESPIRATORY: + poor effort and has bibasilar rales. No accessory muscle use  CARDIOVASCULAR: Regular rate and rhythm; No murmurs; no peripheral edema  GI: Soft, nontender, non distended, normal bowel sounds  SKIN: Dry, intact, No rashes or lesions  MUSCULOSKELETAL: Full range of motion, normal strength  NEURO: sensation intact, extraocular movements intact, no tremor  PSYCH: Alert and oriented x 3, normal affect, normal mood  CUSHING'S SIGNS: no striae    POCT Blood Glucose.: 242 mg/dL (06-16-19 @ 11:54) H2+2  POCT Blood Glucose.: 338 mg/dL (06-16-19 @ 08:21) H2+4  POCT Blood Glucose.: 414 mg/dL (06-16-19 @ 08:13)    POCT Blood Glucose.: 278 mg/dL (06-15-19 @ 23:17) L2  POCT Blood Glucose.: 276 mg/dL (06-15-19 @ 22:36)  POCT Blood Glucose.: 226 mg/dL (06-15-19 @ 20:35)                            9.4    7.3   )-----------( 224      ( 16 Jun 2019 07:38 )             30.3       06-16    135  |  89<L>  |  23  ----------------------------<  284<H>  5.1   |  29  |  4.20<H>    EGFR if : 12<L>  EGFR if non : 11<L>    Ca    9.4      06-16  Mg     1.9     06-16  Phos  4.1     06-16    TPro  6.4  /  Alb  3.7  /  TBili  0.5  /  DBili  x   /  AST  52<H>  /  ALT  28  /  AlkPhos  69  06-16    Hemoglobin A1C, Whole Blood: 8.8 % <H> [4.0 - 5.6] (04-30-19 @ 08:58)

## 2019-06-16 NOTE — PROGRESS NOTE ADULT - SUBJECTIVE AND OBJECTIVE BOX
Patient is a 63y old  Female who presents with a chief complaint of chest pain, SOB (16 Jun 2019 13:01)      SUBJECTIVE / OVERNIGHT EVENTS: feels better  Review of Systems  chest pain no  palpitations no  sob improving   nausea no  headache no    MEDICATIONS  (STANDING):  acetylcysteine 10%  Inhalation 5 milliLiter(s) Inhalation two times a day  ALBUTerol/ipratropium for Nebulization 3 milliLiter(s) Nebulizer every 6 hours  aspirin enteric coated 81 milliGRAM(s) Oral daily  atorvastatin 20 milliGRAM(s) Oral at bedtime  azithromycin  IVPB 500 milliGRAM(s) IV Intermittent every 24 hours  azithromycin  IVPB      clopidogrel Tablet 75 milliGRAM(s) Oral daily  dextrose 5%. 1000 milliLiter(s) (50 mL/Hr) IV Continuous <Continuous>  dextrose 50% Injectable 12.5 Gram(s) IV Push once  dextrose 50% Injectable 25 Gram(s) IV Push once  dextrose 50% Injectable 25 Gram(s) IV Push once  docusate sodium 100 milliGRAM(s) Oral three times a day  heparin  Injectable 5000 Unit(s) SubCutaneous every 8 hours  insulin glargine Injectable (LANTUS) 4 Unit(s) SubCutaneous at bedtime  insulin lispro (HumaLOG) corrective regimen sliding scale   SubCutaneous three times a day before meals  insulin lispro (HumaLOG) corrective regimen sliding scale   SubCutaneous at bedtime  insulin lispro Injectable (HumaLOG) 3 Unit(s) SubCutaneous three times a day before meals  metoprolol tartrate 25 milliGRAM(s) Oral two times a day  piperacillin/tazobactam IVPB. 3.375 Gram(s) IV Intermittent every 12 hours  sevelamer carbonate 1600 milliGRAM(s) Oral three times a day with meals    MEDICATIONS  (PRN):  dextrose 40% Gel 15 Gram(s) Oral once PRN Blood Glucose LESS THAN 70 milliGRAM(s)/deciliter  senna 2 Tablet(s) Oral at bedtime PRN Constipation      Vital Signs Last 24 Hrs  T(C): 36.8 (16 Jun 2019 11:15), Max: 39.1 (15 Christian 2019 20:16)  T(F): 98.2 (16 Jun 2019 11:15), Max: 102.4 (15 Christian 2019 20:16)  HR: 78 (16 Jun 2019 11:15) (78 - 99)  BP: 107/57 (16 Jun 2019 11:15) (94/53 - 145/76)  BP(mean): --  RR: 18 (16 Jun 2019 11:15) (18 - 25)  SpO2: 95% (16 Jun 2019 11:15) (95% - 100%)    PHYSICAL EXAM:  GENERAL: NAD, well-developed  HEAD:  Atraumatic, Normocephalic  EYES: EOMI, PERRLA, conjunctiva and sclera clear  NECK: Supple, No JVD  CHEST/LUNG: few basilar crackles bilaterally; No wheeze  HEART: Regular rate and rhythm; No murmurs, rubs, or gallops  ABDOMEN: Soft, Nontender, Nondistended; Bowel sounds present  EXTREMITIES:  2+ Peripheral Pulses, No clubbing, cyanosis, or edema  PSYCH: AAOx3  NEUROLOGY: non-focal  SKIN: No rashes or lesions    LABS:                        9.4    7.3   )-----------( 224      ( 16 Jun 2019 07:38 )             30.3     06-16    135  |  89<L>  |  23  ----------------------------<  284<H>  5.1   |  29  |  4.20<H>    Ca    9.4      16 Jun 2019 07:11  Phos  4.1     06-16  Mg     1.9     06-16    TPro  6.4  /  Alb  3.7  /  TBili  0.5  /  DBili  x   /  AST  52<H>  /  ALT  28  /  AlkPhos  69  06-16    PT/INR - ( 15 Christian 2019 21:03 )   PT: 11.9 sec;   INR: 1.04 ratio         PTT - ( 15 Christian 2019 21:03 )  PTT:27.8 sec            RADIOLOGY & ADDITIONAL TESTS:    Imaging Personally Reviewed:    Consultant(s) Notes Reviewed:      Care Discussed with Consultants/Other Providers:

## 2019-06-17 LAB
ANION GAP SERPL CALC-SCNC: 20 MMOL/L — HIGH (ref 5–17)
ANISOCYTOSIS BLD QL: SLIGHT — SIGNIFICANT CHANGE UP
BASOPHILS # BLD AUTO: 0 K/UL — SIGNIFICANT CHANGE UP (ref 0–0.2)
BASOPHILS NFR BLD AUTO: 0 % — SIGNIFICANT CHANGE UP (ref 0–2)
BUN SERPL-MCNC: 49 MG/DL — HIGH (ref 7–23)
CALCIUM SERPL-MCNC: 9.6 MG/DL — SIGNIFICANT CHANGE UP (ref 8.4–10.5)
CALCIUM SERPL-MCNC: 9.9 MG/DL — SIGNIFICANT CHANGE UP (ref 8.4–10.5)
CHLORIDE SERPL-SCNC: 83 MMOL/L — LOW (ref 96–108)
CO2 SERPL-SCNC: 27 MMOL/L — SIGNIFICANT CHANGE UP (ref 22–31)
CREAT SERPL-MCNC: 5.79 MG/DL — HIGH (ref 0.5–1.3)
EOSINOPHIL # BLD AUTO: 0 K/UL — SIGNIFICANT CHANGE UP (ref 0–0.5)
EOSINOPHIL NFR BLD AUTO: 0 % — SIGNIFICANT CHANGE UP (ref 0–6)
FERRITIN SERPL-MCNC: 1838 NG/ML — HIGH (ref 15–150)
GLUCOSE BLDC GLUCOMTR-MCNC: 218 MG/DL — HIGH (ref 70–99)
GLUCOSE BLDC GLUCOMTR-MCNC: 304 MG/DL — HIGH (ref 70–99)
GLUCOSE BLDC GLUCOMTR-MCNC: 344 MG/DL — HIGH (ref 70–99)
GLUCOSE BLDC GLUCOMTR-MCNC: 451 MG/DL — CRITICAL HIGH (ref 70–99)
GLUCOSE BLDC GLUCOMTR-MCNC: 459 MG/DL — CRITICAL HIGH (ref 70–99)
GLUCOSE SERPL-MCNC: 444 MG/DL — HIGH (ref 70–99)
HCT VFR BLD CALC: 25.6 % — LOW (ref 34.5–45)
HGB BLD-MCNC: 8.1 G/DL — LOW (ref 11.5–15.5)
IRON SATN MFR SERPL: 17 % — SIGNIFICANT CHANGE UP (ref 14–50)
IRON SATN MFR SERPL: 42 UG/DL — SIGNIFICANT CHANGE UP (ref 30–160)
LEGIONELLA AG UR QL: NEGATIVE — SIGNIFICANT CHANGE UP
LYMPHOCYTES # BLD AUTO: 0.41 K/UL — LOW (ref 1–3.3)
LYMPHOCYTES # BLD AUTO: 5.2 % — LOW (ref 13–44)
MACROCYTES BLD QL: SLIGHT — SIGNIFICANT CHANGE UP
MANUAL SMEAR VERIFICATION: SIGNIFICANT CHANGE UP
MCHC RBC-ENTMCNC: 31.6 GM/DL — LOW (ref 32–36)
MCHC RBC-ENTMCNC: 33.2 PG — SIGNIFICANT CHANGE UP (ref 27–34)
MCV RBC AUTO: 104.9 FL — HIGH (ref 80–100)
MONOCYTES # BLD AUTO: 0.61 K/UL — SIGNIFICANT CHANGE UP (ref 0–0.9)
MONOCYTES NFR BLD AUTO: 7.8 % — SIGNIFICANT CHANGE UP (ref 2–14)
NEUTROPHILS # BLD AUTO: 6.85 K/UL — SIGNIFICANT CHANGE UP (ref 1.8–7.4)
NEUTROPHILS NFR BLD AUTO: 82.6 % — HIGH (ref 43–77)
NEUTS BAND # BLD: 4.4 % — SIGNIFICANT CHANGE UP (ref 0–8)
PHOSPHATE SERPL-MCNC: 5.7 MG/DL — HIGH (ref 2.5–4.5)
PLAT MORPH BLD: NORMAL — SIGNIFICANT CHANGE UP
PLATELET # BLD AUTO: 172 K/UL — SIGNIFICANT CHANGE UP (ref 150–400)
POTASSIUM SERPL-MCNC: 5 MMOL/L — SIGNIFICANT CHANGE UP (ref 3.5–5.3)
POTASSIUM SERPL-SCNC: 5 MMOL/L — SIGNIFICANT CHANGE UP (ref 3.5–5.3)
PTH-INTACT FLD-MCNC: 177 PG/ML — HIGH (ref 15–65)
RBC # BLD: 2.44 M/UL — LOW (ref 3.8–5.2)
RBC # FLD: 13.5 % — SIGNIFICANT CHANGE UP (ref 10.3–14.5)
RBC BLD AUTO: ABNORMAL
SODIUM SERPL-SCNC: 130 MMOL/L — LOW (ref 135–145)
TIBC SERPL-MCNC: 253 UG/DL — SIGNIFICANT CHANGE UP (ref 220–430)
UIBC SERPL-MCNC: 211 UG/DL — SIGNIFICANT CHANGE UP (ref 110–370)
WBC # BLD: 7.87 K/UL — SIGNIFICANT CHANGE UP (ref 3.8–10.5)
WBC # FLD AUTO: 7.87 K/UL — SIGNIFICANT CHANGE UP (ref 3.8–10.5)

## 2019-06-17 PROCEDURE — 99223 1ST HOSP IP/OBS HIGH 75: CPT

## 2019-06-17 PROCEDURE — 93010 ELECTROCARDIOGRAM REPORT: CPT

## 2019-06-17 PROCEDURE — 99232 SBSQ HOSP IP/OBS MODERATE 35: CPT

## 2019-06-17 RX ORDER — INSULIN LISPRO 100/ML
VIAL (ML) SUBCUTANEOUS
Refills: 0 | Status: DISCONTINUED | OUTPATIENT
Start: 2019-06-17 | End: 2019-06-21

## 2019-06-17 RX ORDER — OXYCODONE HYDROCHLORIDE 5 MG/1
5 TABLET ORAL ONCE
Refills: 0 | Status: DISCONTINUED | OUTPATIENT
Start: 2019-06-17 | End: 2019-06-17

## 2019-06-17 RX ORDER — ERYTHROPOIETIN 10000 [IU]/ML
10000 INJECTION, SOLUTION INTRAVENOUS; SUBCUTANEOUS ONCE
Refills: 0 | Status: COMPLETED | OUTPATIENT
Start: 2019-06-17 | End: 2019-06-17

## 2019-06-17 RX ORDER — INSULIN GLARGINE 100 [IU]/ML
5 INJECTION, SOLUTION SUBCUTANEOUS AT BEDTIME
Refills: 0 | Status: DISCONTINUED | OUTPATIENT
Start: 2019-06-17 | End: 2019-06-18

## 2019-06-17 RX ADMIN — SEVELAMER CARBONATE 1600 MILLIGRAM(S): 2400 POWDER, FOR SUSPENSION ORAL at 11:39

## 2019-06-17 RX ADMIN — Medication 100 MILLIGRAM(S): at 06:41

## 2019-06-17 RX ADMIN — AZITHROMYCIN 250 MILLIGRAM(S): 500 TABLET, FILM COATED ORAL at 21:22

## 2019-06-17 RX ADMIN — OXYCODONE HYDROCHLORIDE 5 MILLIGRAM(S): 5 TABLET ORAL at 01:38

## 2019-06-17 RX ADMIN — ATORVASTATIN CALCIUM 20 MILLIGRAM(S): 80 TABLET, FILM COATED ORAL at 21:22

## 2019-06-17 RX ADMIN — OXYCODONE HYDROCHLORIDE 5 MILLIGRAM(S): 5 TABLET ORAL at 02:08

## 2019-06-17 RX ADMIN — Medication 25 MILLIGRAM(S): at 06:41

## 2019-06-17 RX ADMIN — PIPERACILLIN AND TAZOBACTAM 25 GRAM(S): 4; .5 INJECTION, POWDER, LYOPHILIZED, FOR SOLUTION INTRAVENOUS at 06:41

## 2019-06-17 RX ADMIN — OXYCODONE HYDROCHLORIDE 5 MILLIGRAM(S): 5 TABLET ORAL at 23:39

## 2019-06-17 RX ADMIN — Medication 81 MILLIGRAM(S): at 11:38

## 2019-06-17 RX ADMIN — Medication 3 MILLILITER(S): at 23:40

## 2019-06-17 RX ADMIN — Medication 2: at 17:35

## 2019-06-17 RX ADMIN — Medication 5: at 07:59

## 2019-06-17 RX ADMIN — Medication 3 UNIT(S): at 17:35

## 2019-06-17 RX ADMIN — Medication 25 MILLIGRAM(S): at 17:36

## 2019-06-17 RX ADMIN — Medication 3 MILLILITER(S): at 17:36

## 2019-06-17 RX ADMIN — CLOPIDOGREL BISULFATE 75 MILLIGRAM(S): 75 TABLET, FILM COATED ORAL at 11:38

## 2019-06-17 RX ADMIN — ERYTHROPOIETIN 10000 UNIT(S): 10000 INJECTION, SOLUTION INTRAVENOUS; SUBCUTANEOUS at 15:55

## 2019-06-17 RX ADMIN — Medication 3 UNIT(S): at 11:54

## 2019-06-17 RX ADMIN — PIPERACILLIN AND TAZOBACTAM 25 GRAM(S): 4; .5 INJECTION, POWDER, LYOPHILIZED, FOR SOLUTION INTRAVENOUS at 17:36

## 2019-06-17 RX ADMIN — SEVELAMER CARBONATE 1600 MILLIGRAM(S): 2400 POWDER, FOR SUSPENSION ORAL at 17:36

## 2019-06-17 RX ADMIN — Medication 2: at 22:04

## 2019-06-17 RX ADMIN — Medication 3 UNIT(S): at 07:59

## 2019-06-17 RX ADMIN — INSULIN GLARGINE 5 UNIT(S): 100 INJECTION, SOLUTION SUBCUTANEOUS at 22:04

## 2019-06-17 RX ADMIN — Medication 3: at 11:53

## 2019-06-17 RX ADMIN — SEVELAMER CARBONATE 1600 MILLIGRAM(S): 2400 POWDER, FOR SUSPENSION ORAL at 08:04

## 2019-06-17 RX ADMIN — Medication 3 MILLILITER(S): at 06:41

## 2019-06-17 RX ADMIN — Medication 5 MILLILITER(S): at 06:44

## 2019-06-17 NOTE — CHART NOTE - NSCHARTNOTEFT_GEN_A_CORE
63y female with hx CAD, ESRD on HD, CHF, severesAS, COPD, PVD s/p b/l fenm pop bypass, hx hilar adenopathy, recent hospitalizations for DKA, cough for 2 weeks, occasional fevers and chills. CRX with new patchy L basilar opasity. Pt now started on IV Zosyn. She feels better today, still SOB at baseline, no fevers today    PLAN:  cont Zosyn and Zithromax for possible multifocal pna seen on CT   f/u cultures  d/w Dr Viera Interval events    CC: Patient c/o chronic neuropathic pain LE, pain score 10/10, sitting in bed and crying  On percocet at home    Vital Signs Last 24 Hrs  T(C): 36.9 (17 Jun 2019 03:50), Max: 36.9 (17 Jun 2019 03:50)  T(F): 98.4 (17 Jun 2019 03:50), Max: 98.4 (17 Jun 2019 03:50)  HR: 70 (17 Jun 2019 03:50) (70 - 83)  BP: 102/57 (17 Jun 2019 01:12) (102/57 - 115/61)  BP(mean): --  RR: 18 (17 Jun 2019 03:50) (18 - 18)  SpO2: 100% (17 Jun 2019 03:50) (95% - 100%)    63y female with hx CAD, ESRD on HD, CHF, severesAS, COPD, PVD s/p b/l fenm pop bypass, hx hilar adenopathy, recent hospitalizations for DKA, cough for 2 weeks, occasional fevers and chills. CRX with new patchy L basilar opasity. Pt now started on IV Zosyn.     Chronic pain, on percocet at home,   Pain not responding to Tylenol  Vitals stable  Oxycodone 5mg pox1 dose ordered  Will follow    Husam Gipson P BC  [id number]

## 2019-06-17 NOTE — CONSULT NOTE ADULT - SUBJECTIVE AND OBJECTIVE BOX
CHIEF COMPLAINT: sob    HISTORY OF PRESENT ILLNESS:  63F c hx CAD (Cath Feb '19 showing 99% RCA, 100% RPLS, 100% Cx) s/p multiple stents/brachytherapy, ESRD (on HD M/T/T/Sa), DM1 c/b neuropathy and retinopathy, CHF (EF 30% per last [facility]), mod MR, severe AS, RHF, TIA, severe PVD s/p b/l fempop bypass, left subclavian vein stenosis s/p stent, COPD not on O2, gout, hilar lymphadenopathy of unknown etiology, poor medical mgmt at home, frequent hospitalizations (usually 1-3 times a month) for DKA, hyperglycemia, PNA, recently discharged 3 days ago for hyperkalemia, pw SOB, coughing for 2 weeks.    Pt is arousable, answers simple questions only, refusing to answer questions in detail, and generally refusing to talk. At baseline, pt is able to ambulate short distances with a cane. Pt reports having her usual HD session today. Pt reports she is here in the hospital for SOB and that she's been coughing for 2 weeks. Pt also reports fevers and chills for 1 day. Pt denies pain anywhere else,.    VS: Tm 100.5, P 99, Bp 105/56, R 25, 96% on 4L  In the ED, received lantus 2U, regular insulin 5U, duoneb, tylenol, azithro, vanc, zosyn.      Allergies  No Known Allergies      MEDICATIONS:  aspirin enteric coated 81 milliGRAM(s) Oral daily  clopidogrel Tablet 75 milliGRAM(s) Oral daily  heparin  Injectable 5000 Unit(s) SubCutaneous every 8 hours  metoprolol tartrate 25 milliGRAM(s) Oral two times a day  azithromycin  IVPB 500 milliGRAM(s) IV Intermittent every 24 hours  azithromycin  IVPB      piperacillin/tazobactam IVPB. 3.375 Gram(s) IV Intermittent every 12 hours  ALBUTerol/ipratropium for Nebulization 3 milliLiter(s) Nebulizer every 6 hours  docusate sodium 100 milliGRAM(s) Oral three times a day  senna 2 Tablet(s) Oral at bedtime PRN  atorvastatin 20 milliGRAM(s) Oral at bedtime  dextrose 40% Gel 15 Gram(s) Oral once PRN  dextrose 50% Injectable 12.5 Gram(s) IV Push once  dextrose 50% Injectable 25 Gram(s) IV Push once  dextrose 50% Injectable 25 Gram(s) IV Push once  insulin glargine Injectable (LANTUS) 4 Unit(s) SubCutaneous at bedtime  insulin lispro (HumaLOG) corrective regimen sliding scale   SubCutaneous three times a day before meals  insulin lispro (HumaLOG) corrective regimen sliding scale   SubCutaneous at bedtime  insulin lispro Injectable (HumaLOG) 3 Unit(s) SubCutaneous three times a day before meals  dextrose 5%. 1000 milliLiter(s) IV Continuous <Continuous>      PAST MEDICAL & SURGICAL HISTORY:  COPD (chronic obstructive pulmonary disease)  Localized enlarged lymph nodes  CHF (congestive heart failure): EF 40-45%  Subclavian vein stenosis, left: s/p stent  DKA, type 1: 1/2015  ACS (acute coronary syndrome): 1/2015 - cath revealed 100% ostial stenosis not amenable to PCI - medical management  TIA (transient ischemic attack): x 2 - 8-9 years ago prior to ASD/VSD repair  CAD (coronary artery disease): s/p stents  Gout: past  CVA (cerebral infarction): with no residual, 8 yrs ago, prior to heart surgery - ST memory loss  Peripheral vascular disease: occluded left fem-pop bypass 5/2015  Diabetes mellitus type 1: Insulin Dependent -  ESRD (end stage renal disease): dialysis  M, tue, thursday, saturday  Hyperlipidemia  Status post device closure of ASD: &quot;clamshell&quot;  History of cardiac catheterization: 1/2015 - no intervention  S/P femoral-popliteal bypass surgery: L and R in 2013 with graft; 5/2015 CFA angioplasty left and ileofemoral endarterectomywith vein patch angioplasty of left fem-pop bypass graft  Multiple vascular surgery both leg, left fempop bypass revision 11/2015  AV (arteriovenous fistula): Left AV graft; revision with stent placement 2-3 years ago  S/P cholecystectomy      FAMILY HISTORY:  Family history of smoking  Family history of hypertension  Family history of cancer (Sibling)      SOCIAL HISTORY:    former smoker. independent in adl      REVIEW OF SYSTEMS:  See HPI, otherwise complete 10 point review of systems negative    [ ] All others negative	      PHYSICAL EXAM:  T(C): 36.9 (06-17-19 @ 03:50), Max: 36.9 (06-17-19 @ 03:50)  HR: 70 (06-17-19 @ 03:50) (70 - 83)  BP: 104/50 (06-17-19 @ 06:16) (102/57 - 115/61)  RR: 18 (06-17-19 @ 03:50) (18 - 18)  SpO2: 100% (06-17-19 @ 03:50) (95% - 100%)  Wt(kg): --  I&O's Summary      Appearance: No Acute Distress	  HEENT:  Normal oral mucosa, PERRL, EOMI	  Cardiovascular: Normal S1 S2, No JVD, No murmurs/rubs/gallops  Respiratory: Lungs clear to auscultation bilaterally  Gastrointestinal:  Soft, Non-tender, + BS	  Skin: No rashes, No ecchymoses, No cyanosis	  Neurologic: Non-focal  Extremities: No clubbing, cyanosis or edema  Vascular: Peripheral pulses palpable 2+ bilaterally  Psychiatry: A & O x 3, Mood & affect appropriate    Laboratory Data:	 	    CBC Full  -  ( 16 Jun 2019 07:38 )  WBC Count : 7.3 K/uL  Hemoglobin : 9.4 g/dL  Hematocrit : 30.3 %  Platelet Count - Automated : 224 K/uL  Mean Cell Volume : 104.0 fl  Mean Cell Hemoglobin : 32.3 pg  Mean Cell Hemoglobin Concentration : 31.0 gm/dL  Auto Neutrophil # : 5.8 K/uL  Auto Lymphocyte # : 0.8 K/uL  Auto Monocyte # : 0.6 K/uL  Auto Eosinophil # : 0.0 K/uL  Auto Basophil # : 0.0 K/uL  Auto Neutrophil % : 80.3 %  Auto Lymphocyte % : 10.7 %  Auto Monocyte % : 8.7 %  Auto Eosinophil % : 0.1 %  Auto Basophil % : 0.1 %    06-17    130<L>  |  83<L>  |  49<H>  ----------------------------<  444<H>  5.0   |  27  |  5.79<H>  06-16    135  |  89<L>  |  23  ----------------------------<  284<H>  5.1   |  29  |  4.20<H>    Ca    9.9      17 Jun 2019 06:49  Ca    9.4      16 Jun 2019 07:11  Phos  4.1     06-16  Phos  3.1     06-15  Mg     1.9     06-16  Mg     1.8     06-15    TPro  6.4  /  Alb  3.7  /  TBili  0.5  /  DBili  x   /  AST  52<H>  /  ALT  28  /  AlkPhos  69  06-16  TPro  7.3  /  Alb  4.2  /  TBili  0.3  /  DBili  x   /  AST  56<H>  /  ALT  29  /  AlkPhos  85  06-15      Tele: NSR, ST    Assessment:  -SOB  -multifocal PNA  -NSVT  -pAT  -volume overload  -ischemic cardiomyopathy, cad s/p multiple stents  -esrd on hd  -PAD s/p prior intervention   -malfunctioning AV fistula      Recs:  -SOB: likely multifactorial including multifocal PNA, voume overload 2/2 ESRD. cardiomyopathy, valvular pathology. c/w volume removal with HD. c/w broad spectrum abx. f/u ID  -hypo/hyperglycemia --> f/u endo. improved  -ischemic cardiomyopathy s/p LHC with Dr Vela 2/20 --> severe and diffuse instent restenosis along RCA --> unable to stent due to multiple layers of stenting and prior brachytherapy. we can consider complex intervention if true ischemic sx develop. c/w medical treatment with anti-platelet, statins and anti-anginals for now.  c/w metop succinate 50mg daily for anti-anginal effect and for pAT/NSVT. patient declines ICD for primary prevention at this time, and agree that this would pose an increased infection risk and unlikely to be of significant benefit given overall poor prognosis and ongoing chronic medical issues. can address further as outpatient  -c/w beta blockers for nsvt/pat/cad (as above)  -hx of prolonged qtc. avoid qtc prolonging meds  -s/p recent TTE: mod-severe MR, pseudo AS (low flow-low gradient), mod-severe LV dysfunction --> recent CTS consult with dr hebert appreciated. plan is for medical management given surgical risk and patient preference  -c/w asa, plavix, statin for ischemic cardiomyopathy/CAD/PAD  -dvt ppx  -consider dispo to LTAC once cleared for discharge        Greater than 60 minutes spent on total encounter; more than 50% of the visit was spent counseling and/or coordinating care by the attending physician.   	  Julián Biswas MD   Cardiovascular Diseases  [phone]

## 2019-06-17 NOTE — PROGRESS NOTE ADULT - SUBJECTIVE AND OBJECTIVE BOX
Patient is a 63y old  Female who presents with a chief complaint of chest pain, SOB (17 Jun 2019 16:36)      SUBJECTIVE / OVERNIGHT EVENTS: feels better  Review of Systems  chest pain no  palpitations no  sob no  nausea no  headache no    MEDICATIONS  (STANDING):  ALBUTerol/ipratropium for Nebulization 3 milliLiter(s) Nebulizer every 6 hours  aspirin enteric coated 81 milliGRAM(s) Oral daily  atorvastatin 20 milliGRAM(s) Oral at bedtime  azithromycin  IVPB 500 milliGRAM(s) IV Intermittent every 24 hours  azithromycin  IVPB      clopidogrel Tablet 75 milliGRAM(s) Oral daily  dextrose 5%. 1000 milliLiter(s) (50 mL/Hr) IV Continuous <Continuous>  dextrose 50% Injectable 12.5 Gram(s) IV Push once  dextrose 50% Injectable 25 Gram(s) IV Push once  dextrose 50% Injectable 25 Gram(s) IV Push once  docusate sodium 100 milliGRAM(s) Oral three times a day  heparin  Injectable 5000 Unit(s) SubCutaneous every 8 hours  insulin glargine Injectable (LANTUS) 5 Unit(s) SubCutaneous at bedtime  insulin lispro (HumaLOG) corrective regimen sliding scale   SubCutaneous three times a day before meals  insulin lispro (HumaLOG) corrective regimen sliding scale   SubCutaneous at bedtime  insulin lispro Injectable (HumaLOG) 3 Unit(s) SubCutaneous three times a day before meals  metoprolol tartrate 25 milliGRAM(s) Oral two times a day  piperacillin/tazobactam IVPB. 3.375 Gram(s) IV Intermittent every 12 hours  sevelamer carbonate 1600 milliGRAM(s) Oral three times a day with meals    MEDICATIONS  (PRN):  dextrose 40% Gel 15 Gram(s) Oral once PRN Blood Glucose LESS THAN 70 milliGRAM(s)/deciliter  senna 2 Tablet(s) Oral at bedtime PRN Constipation      Vital Signs Last 24 Hrs  T(C): 36.3 (17 Jun 2019 16:20), Max: 36.9 (17 Jun 2019 03:50)  T(F): 97.4 (17 Jun 2019 16:20), Max: 98.4 (17 Jun 2019 03:50)  HR: 73 (17 Jun 2019 16:20) (70 - 97)  BP: 127/55 (17 Jun 2019 16:20) (102/57 - 142/77)  BP(mean): --  RR: 18 (17 Jun 2019 16:20) (18 - 18)  SpO2: 100% (17 Jun 2019 16:20) (98% - 100%)    PHYSICAL EXAM:  GENERAL: NAD  HEAD:  Atraumatic, Normocephalic  EYES: EOMI, PERRLA, conjunctiva and sclera clear  NECK: Supple, No JVD  CHEST/LUNG: few crackles L>R to auscultation bilaterally; No wheeze  HEART: Regular rate and rhythm; No murmurs, rubs, or gallops  ABDOMEN: Soft, Nontender, Nondistended; Bowel sounds present  EXTREMITIES:  2+ Peripheral Pulses, No clubbing, cyanosis, or edema  PSYCH: AAOx3  NEUROLOGY: non-focal  SKIN: No rashes or lesions    LABS:                        8.1    7.87  )-----------( 172      ( 17 Jun 2019 07:59 )             25.6     06-17    130<L>  |  83<L>  |  49<H>  ----------------------------<  444<H>  5.0   |  27  |  5.79<H>    Ca    9.9      17 Jun 2019 06:49  Phos  5.7     06-17  Mg     1.9     06-16    TPro  6.4  /  Alb  3.7  /  TBili  0.5  /  DBili  x   /  AST  52<H>  /  ALT  28  /  AlkPhos  69  06-16    PT/INR - ( 15 Christian 2019 21:03 )   PT: 11.9 sec;   INR: 1.04 ratio         PTT - ( 15 Christian 2019 21:03 )  PTT:27.8 sec          Culture - Blood (collected 16 Jun 2019 00:33)  Source: .Blood  Preliminary Report (17 Jun 2019 01:01):    No growth to date.    Culture - Blood (collected 16 Jun 2019 00:33)  Source: .Blood  Preliminary Report (17 Jun 2019 01:01):    No growth to date.        RADIOLOGY & ADDITIONAL TESTS:    Imaging Personally Reviewed:    Consultant(s) Notes Reviewed:      Care Discussed with Consultants/Other Providers:

## 2019-06-17 NOTE — PROGRESS NOTE ADULT - SUBJECTIVE AND OBJECTIVE BOX
[facility]   [phone]  DIALYSIS NOTE  Chief Complaint: ESRD/Ongoing hemodialysis requirement. seen on hd    24 hour events/subjective:      still with cough and sob       ALLERGIES & MEDICATIONS  --------------------------------------------------------------------------------  Allergies    No Known Allergies    Intolerances      Standing Inpatient Medications  ALBUTerol/ipratropium for Nebulization 3 milliLiter(s) Nebulizer every 6 hours  aspirin enteric coated 81 milliGRAM(s) Oral daily  atorvastatin 20 milliGRAM(s) Oral at bedtime  azithromycin  IVPB 500 milliGRAM(s) IV Intermittent every 24 hours  azithromycin  IVPB      clopidogrel Tablet 75 milliGRAM(s) Oral daily  dextrose 5%. 1000 milliLiter(s) IV Continuous <Continuous>  dextrose 50% Injectable 12.5 Gram(s) IV Push once  dextrose 50% Injectable 25 Gram(s) IV Push once  dextrose 50% Injectable 25 Gram(s) IV Push once  docusate sodium 100 milliGRAM(s) Oral three times a day  heparin  Injectable 5000 Unit(s) SubCutaneous every 8 hours  insulin glargine Injectable (LANTUS) 5 Unit(s) SubCutaneous at bedtime  insulin lispro (HumaLOG) corrective regimen sliding scale   SubCutaneous three times a day before meals  insulin lispro (HumaLOG) corrective regimen sliding scale   SubCutaneous at bedtime  insulin lispro Injectable (HumaLOG) 3 Unit(s) SubCutaneous three times a day before meals  metoprolol tartrate 25 milliGRAM(s) Oral two times a day  piperacillin/tazobactam IVPB. 3.375 Gram(s) IV Intermittent every 12 hours  sevelamer carbonate 1600 milliGRAM(s) Oral three times a day with meals    PRN Inpatient Medications  dextrose 40% Gel 15 Gram(s) Oral once PRN  senna 2 Tablet(s) Oral at bedtime PRN      REVIEW OF SYSTEMS  --------------------------------------------------------------------------------  no itching or rash  no fever or chill  no cp or palp   + sob/cough   no N/V/D/ no abd pain   ext no edema        VITALS/PHYSICAL EXAM  --------------------------------------------------------------------------------  T(C): 36.6 (06-17-19 @ 12:48), Max: 36.9 (06-17-19 @ 03:50)  HR: 77 (06-17-19 @ 12:48) (70 - 97)  BP: 107/60 (06-17-19 @ 12:48) (102/57 - 142/77)  RR: 18 (06-17-19 @ 12:48) (18 - 18)  SpO2: 100% (06-17-19 @ 12:48) (98% - 100%)  Wt(kg): --  Height (cm): 160.02 (06-15-19 @ 20:16)  Weight (kg): 54.4 (06-15-19 @ 20:16)  BMI (kg/m2): 21.2 (06-15-19 @ 20:16)  BSA (m2): 1.56 (06-15-19 @ 20:16)      06-17-19 @ 07:01  -  06-17-19 @ 16:36  --------------------------------------------------------  IN: 240 mL / OUT: 0 mL / NET: 240 mL      Physical Exam:  		  	Gen: remains thin and ill appearing   	no jvd ,  	Pulm: decrease bs  no rales  + ronchi   no wheezing  	CV: RRR, S1S2; no rub  	Abd: +BS, soft, nontender/nondistended  	: No suprapubic tenderness  	UE: Warm, no cyanosis  no clubbing,  no edema;  	LE: Warm, no cyanosis  no clubbing, 1+  edema  	Neuro: alert and oriented. speech coherent  but tone soft   	   	    LABS/STUDIES  --------------------------------------------------------------------------------              8.1    7.87  >-----------<  172      [06-17-19 @ 07:59]              25.6     130  |  83  |  49  ----------------------------<  444      [06-17-19 @ 06:49]  5.0   |  27  |  5.79        Ca     9.9     [06-17-19 @ 06:49]      Mg     1.9     [06-16-19 @ 07:11]      Phos  5.7     [06-17-19 @ 12:43]    TPro  6.4  /  Alb  3.7  /  TBili  0.5  /  DBili  x   /  AST  52  /  ALT  28  /  AlkPhos  69  [06-16-19 @ 07:11]    PT/INR: PT 11.9 , INR 1.04       [06-15-19 @ 21:03]  PTT: 27.8       [06-15-19 @ 21:03]    Serum Osmolality 288      [06-15-19 @ 21:03]            imp/suggest: ESRD      Hemodialysis Prescription:  	Access:  	Dialyzer: revaclear   	Blood Flow (mL/Min): 400  	Dialysate Flow (mL/Min): 600  	Target UF (Liters):  	Treatment Time:  	Potassium:   	Calcium: 2.5  	  YOLANDA    Vitamin D     continue with hd   see hd flow sheet

## 2019-06-17 NOTE — CONSULT NOTE ADULT - SUBJECTIVE AND OBJECTIVE BOX
PULMONARY CONSULT NOTE      NATHAN MEEKS  MRN-[id number]    Patient is a 63y old  Female who presents with a chief complaint of chest pain, SOB (17 Jun 2019 07:44)      HISTORY OF PRESENT ILLNESS:  62 yo female with PMH significant for CAD (Cath Feb '19 showing 99% RCA, 100% RPLS, 100% Cx) s/p multiple stents/brachytherapy, ESRD (on HD M/T/T/Sa), DM1, CHF (EF 30% per last [facility]), mod MR, severe AS,  TIA, severe PVD s/p b/l fempop bypass, left subclavian vein stenosis s/p stent, COPD not on medications, admitted for cough/dyspnea. She is known to Dr Thompson from our office, managed  for COPD, last seen May 31, history of EBUS in March- unremarkable.  admitted to [facility] for PNA and parainfluenza infection  today complaining of cough,feels she needs her 02.    Allergies    No Known Allergies    Intolerances        PAST MEDICAL & SURGICAL HISTORY:  COPD (chronic obstructive pulmonary disease)  Localized enlarged lymph nodes  CHF (congestive heart failure): EF 40-45%  Subclavian vein stenosis, left: s/p stent  DKA, type 1: 1/2015  ACS (acute coronary syndrome): 1/2015 - cath revealed 100% ostial stenosis not amenable to PCI - medical management  TIA (transient ischemic attack): x 2 - 8-9 years ago prior to ASD/VSD repair  CAD (coronary artery disease): s/p stents  Gout: past  CVA (cerebral infarction): with no residual, 8 yrs ago, prior to heart surgery - ST memory loss  Peripheral vascular disease: occluded left fem-pop bypass 5/2015  Diabetes mellitus type 1: Insulin Dependent -  ESRD (end stage renal disease): dialysis  M, tue, thursday, saturday  Hyperlipidemia  Status post device closure of ASD: &quot;clamshell&quot;  History of cardiac catheterization: 1/2015 - no intervention  S/P femoral-popliteal bypass surgery: L and R in 2013 with graft; 5/2015 CFA angioplasty left and ileofemoral endarterectomywith vein patch angioplasty of left fem-pop bypass graft  Multiple vascular surgery both leg, left fempop bypass revision 11/2015  AV (arteriovenous fistula): Left AV graft; revision with stent placement 2-3 years ago  S/P cholecystectomy          FAMILY HISTORY:  Family history of smoking  Family history of hypertension  Family history of cancer (Sibling)    Prescriptions:  furosemide 20 mg oral tablet: Last Dose Taken:  , 1 tab(s) orally 3 times a week   · 	Multiple Vitamins oral tablet: Last Dose Taken:  , 1 tab(s) orally once a day  · 	hydrALAZINE 25 mg oral tablet: Last Dose Taken:  , 1 tab(s) orally 3 times a day  · 	sevelamer carbonate 800 mg oral tablet: Last Dose Taken:  , 2 tab(s) orally 3 times a day (with meals)  · 	metoprolol succinate 50 mg oral tablet, extended release: Last Dose Taken:  , 1 tab(s) orally once a day  · 	clopidogrel 75 mg oral tablet: Last Dose Taken:  , 1 tab(s) orally once a day  · 	insulin lispro 100 units/mL injectable solution: Last Dose Taken:  , 3 unit(s) injectable 3 times a day  · 	lisinopril 40 mg oral tablet: Last Dose Taken:  , 1 tab(s) orally once a day  · 	aspirin 81 mg oral delayed release tablet: Last Dose Taken:  , 1 tab(s) orally once a day  · 	Percocet 5/325 oral tablet: Last Dose Taken:  , 2 tab(s) orally once a day (at bedtime),     SOCIAL HISTORY  Smoking History:  40 pack year    REVIEW OF SYSTEMS:    CONSTITUTIONAL:  + chills     HEENT:  Eyes:  No diplopia or blurred vision. ENT:  No earache, sore throat or runny nose.    CARDIOVASCULAR:  No pressure, squeezing, tightness, or heaviness about the chest; no palpitations.    RESPIRATORY:  Per HPI    GASTROINTESTINAL:  No abdominal pain, nausea, vomiting or diarrhea.    GENITOURINARY:  No dysuria, frequency or urgency.    NEUROLOGIC:  No paresthesias, fasciculations, seizures or weakness.    PSYCHIATRIC:  No disorder of thought or mood.    Vital Signs Last 24 Hrs  T(C): 36.3 (17 Jun 2019 11:44), Max: 36.9 (17 Jun 2019 03:50)  T(F): 97.4 (17 Jun 2019 11:44), Max: 98.4 (17 Jun 2019 03:50)  HR: 97 (17 Jun 2019 11:44) (70 - 97)  BP: 142/77 (17 Jun 2019 11:44) (102/57 - 142/77)  BP(mean): --  RR: 18 (17 Jun 2019 11:44) (18 - 18)  SpO2: 100% (17 Jun 2019 11:44) (98% - 100%)    PHYSICAL EXAMINATION:    GENERAL: The patient is a female in no apparent distress.     HEENT: Head is normocephalic and atraumatic. Extraocular muscles are intact. Mucous membranes are moist.     NECK: Supple.     LUNGS: Clear to auscultation without wheezing, rales, or rhonchi. Respirations unlabored    HEART: Regular rate and rhythm without murmur.    ABDOMEN: Soft, nontender, and nondistended.  No hepatosplenomegaly is noted.    EXTREMITIES: Without any cyanosis, clubbing, rash, lesions or edema.    NEUROLOGIC: Grossly intact      MEDICATIONS  (STANDING):  ALBUTerol/ipratropium for Nebulization 3 milliLiter(s) Nebulizer every 6 hours  aspirin enteric coated 81 milliGRAM(s) Oral daily  atorvastatin 20 milliGRAM(s) Oral at bedtime  azithromycin  IVPB 500 milliGRAM(s) IV Intermittent every 24 hours  azithromycin  IVPB      clopidogrel Tablet 75 milliGRAM(s) Oral daily  dextrose 5%. 1000 milliLiter(s) (50 mL/Hr) IV Continuous <Continuous>  dextrose 50% Injectable 12.5 Gram(s) IV Push once  dextrose 50% Injectable 25 Gram(s) IV Push once  dextrose 50% Injectable 25 Gram(s) IV Push once  docusate sodium 100 milliGRAM(s) Oral three times a day  epoetin azalea Injectable 62950 Unit(s) IV Push once  heparin  Injectable 5000 Unit(s) SubCutaneous every 8 hours  insulin glargine Injectable (LANTUS) 4 Unit(s) SubCutaneous at bedtime  insulin lispro (HumaLOG) corrective regimen sliding scale   SubCutaneous three times a day before meals  insulin lispro (HumaLOG) corrective regimen sliding scale   SubCutaneous at bedtime  insulin lispro Injectable (HumaLOG) 3 Unit(s) SubCutaneous three times a day before meals  metoprolol tartrate 25 milliGRAM(s) Oral two times a day  piperacillin/tazobactam IVPB. 3.375 Gram(s) IV Intermittent every 12 hours  sevelamer carbonate 1600 milliGRAM(s) Oral three times a day with meals      MEDICATIONS  (PRN):  dextrose 40% Gel 15 Gram(s) Oral once PRN Blood Glucose LESS THAN 70 milliGRAM(s)/deciliter  senna 2 Tablet(s) Oral at bedtime PRN Constipation        LABS:   CBC Full  -  ( 17 Jun 2019 07:59 )  WBC Count : 7.87 K/uL  RBC Count : 2.44 M/uL  Hemoglobin : 8.1 g/dL  Hematocrit : 25.6 %  Platelet Count - Automated : 172 K/uL  Mean Cell Volume : 104.9 fl  Mean Cell Hemoglobin : 33.2 pg  Mean Cell Hemoglobin Concentration : 31.6 gm/dL  Auto Neutrophil # : 6.85 K/uL  Auto Lymphocyte # : 0.41 K/uL  Auto Monocyte # : 0.61 K/uL  Auto Eosinophil # : 0.00 K/uL  Auto Basophil # : 0.00 K/uL  Auto Neutrophil % : 82.6 %  Auto Lymphocyte % : 5.2 %  Auto Monocyte % : 7.8 %  Auto Eosinophil % : 0.0 %  Auto Basophil % : 0.0 %    PT/INR - ( 15 Christian 2019 21:03 )   PT: 11.9 sec;   INR: 1.04 ratio         PTT - ( 15 Christian 2019 21:03 )  PTT:27.8 sec  06-17    130<L>  |  83<L>  |  49<H>  ----------------------------<  444<H>  5.0   |  27  |  5.79<H>    Ca    9.9      17 Jun 2019 06:49  Phos  4.1     06-16  Mg     1.9     06-16    TPro  6.4  /  Alb  3.7  /  TBili  0.5  /  DBili  x   /  AST  52<H>  /  ALT  28  /  AlkPhos  69  06-16              Culture - Blood (collected 16 Jun 2019 00:33)  Source: .Blood  Preliminary Report (17 Jun 2019 01:01):    No growth to date.    Culture - Blood (collected 16 Jun 2019 00:33)  Source: .Blood  Preliminary Report (17 Jun 2019 01:01):    No growth to date.        RADIOLOGY & ADDITIONAL STUDIES:  < from: Xray Chest 1 View-PORTABLE IMMEDIATE (06.15.19 @ 22:06) >  New patchy left basilar opacity compatible with pneumonia.    < end of copied text >    ECHO: none on this admission     ASSESSMENT:  62 yo female with multiple medical problems admitted for parainfluenza infection with superimposed LLL infiltrate    PLAN:  appreciate cardio & ID care  continue abx as per ID  wean down 02 as tolerated  she is not adherent to inhalers, PFTs showing moderate obstructive disease(outpatient)  will continue to discuss importance of therapy for herCOPD          Thank you for allowing me to participate in the care of this patient.  Please feel free to call me for any questions/concerns.      Chelle Kapoor DO  [facility]  [phone]

## 2019-06-17 NOTE — PROGRESS NOTE ADULT - SUBJECTIVE AND OBJECTIVE BOX
Chief Complaint/Follow-up on: T1DM    Subjective: Patient here with pneumonia    MEDICATIONS  (STANDING):  ALBUTerol/ipratropium for Nebulization 3 milliLiter(s) Nebulizer every 6 hours  aspirin enteric coated 81 milliGRAM(s) Oral daily  atorvastatin 20 milliGRAM(s) Oral at bedtime  azithromycin  IVPB 500 milliGRAM(s) IV Intermittent every 24 hours  azithromycin  IVPB      clopidogrel Tablet 75 milliGRAM(s) Oral daily  dextrose 5%. 1000 milliLiter(s) (50 mL/Hr) IV Continuous <Continuous>  dextrose 50% Injectable 12.5 Gram(s) IV Push once  dextrose 50% Injectable 25 Gram(s) IV Push once  dextrose 50% Injectable 25 Gram(s) IV Push once  docusate sodium 100 milliGRAM(s) Oral three times a day  epoetin azalea Injectable 25459 Unit(s) IV Push once  heparin  Injectable 5000 Unit(s) SubCutaneous every 8 hours  insulin glargine Injectable (LANTUS) 4 Unit(s) SubCutaneous at bedtime  insulin lispro (HumaLOG) corrective regimen sliding scale   SubCutaneous three times a day before meals  insulin lispro (HumaLOG) corrective regimen sliding scale   SubCutaneous at bedtime  insulin lispro Injectable (HumaLOG) 3 Unit(s) SubCutaneous three times a day before meals  metoprolol tartrate 25 milliGRAM(s) Oral two times a day  piperacillin/tazobactam IVPB. 3.375 Gram(s) IV Intermittent every 12 hours  sevelamer carbonate 1600 milliGRAM(s) Oral three times a day with meals    MEDICATIONS  (PRN):  dextrose 40% Gel 15 Gram(s) Oral once PRN Blood Glucose LESS THAN 70 milliGRAM(s)/deciliter  senna 2 Tablet(s) Oral at bedtime PRN Constipation      PHYSICAL EXAM:  VITALS: T(C): 36.3 (06-17-19 @ 11:44)  T(F): 97.4 (06-17-19 @ 11:44), Max: 98.4 (06-17-19 @ 03:50)  HR: 97 (06-17-19 @ 11:44) (70 - 97)  BP: 142/77 (06-17-19 @ 11:44) (102/57 - 142/77)  RR:  (18 - 18)  SpO2:  (98% - 100%)    GENERAL: NAD, well-groomed, well-developed  EYES: No proptosis, no injection  HEENT:  Atraumatic, Normocephalic, moist mucous membranes  THYROID: Normal size, no palpable nodules  RESPIRATORY: Clear to auscultation bilaterally; No rales, rhonchi, wheezing, or rubs  CARDIOVASCULAR: Regular rate and rhythm; No murmurs; no peripheral edema  GI: Soft, nontender, non distended, normal bowel sounds  CUSHING'S SIGNS: no striae    POCT Blood Glucose.: 304 mg/dL (06-17-19 @ 11:43) H3+3  POCT Blood Glucose.: 451 mg/dL (06-17-19 @ 07:48) H3+5  POCT Blood Glucose.: 459 mg/dL (06-17-19 @ 07:47)    POCT Blood Glucose.: 326 mg/dL (06-16-19 @ 21:43) Lantus 4, Hum 2  POCT Blood Glucose.: 260 mg/dL (06-16-19 @ 16:36) H3+2  POCT Blood Glucose.: 242 mg/dL (06-16-19 @ 11:54) H2+2  POCT Blood Glucose.: 338 mg/dL (06-16-19 @ 08:21) H2+4  POCT Blood Glucose.: 414 mg/dL (06-16-19 @ 08:13)    POCT Blood Glucose.: 278 mg/dL (06-15-19 @ 23:17)  POCT Blood Glucose.: 276 mg/dL (06-15-19 @ 22:36)  POCT Blood Glucose.: 226 mg/dL (06-15-19 @ 20:35)    06-17    130<L>  |  83<L>  |  49<H>  ----------------------------<  444<H>  5.0   |  27  |  5.79<H>    EGFR if : 8<L>  EGFR if non : 7<L>    Ca    9.9      06-17  Mg     1.9     06-16  Phos  4.1     06-16    TPro  6.4  /  Alb  3.7  /  TBili  0.5  /  DBili  x   /  AST  52<H>  /  ALT  28  /  AlkPhos  69  06-16      Hemoglobin A1C, Whole Blood: 8.2 % <H> [4.0 - 5.6] (06-16-19 @ 13:24)  Hemoglobin A1C, Whole Blood: 8.8 % <H> [4.0 - 5.6] (04-30-19 @ 08:58) Chief Complaint/Follow-up on: T1DM    Subjective: Patient here with pneumonia, going for dialysis now. Admits to breathing better. Ate 100% dinner and breakfast. Also drinks diet ginger-weston. Had fruit last night and 1 sheila cracker.     MEDICATIONS  (STANDING):  ALBUTerol/ipratropium for Nebulization 3 milliLiter(s) Nebulizer every 6 hours  aspirin enteric coated 81 milliGRAM(s) Oral daily  atorvastatin 20 milliGRAM(s) Oral at bedtime  azithromycin  IVPB 500 milliGRAM(s) IV Intermittent every 24 hours  azithromycin  IVPB      clopidogrel Tablet 75 milliGRAM(s) Oral daily  dextrose 5%. 1000 milliLiter(s) (50 mL/Hr) IV Continuous <Continuous>  dextrose 50% Injectable 12.5 Gram(s) IV Push once  dextrose 50% Injectable 25 Gram(s) IV Push once  dextrose 50% Injectable 25 Gram(s) IV Push once  docusate sodium 100 milliGRAM(s) Oral three times a day  epoetin azalea Injectable 14268 Unit(s) IV Push once  heparin  Injectable 5000 Unit(s) SubCutaneous every 8 hours  insulin glargine Injectable (LANTUS) 4 Unit(s) SubCutaneous at bedtime  insulin lispro (HumaLOG) corrective regimen sliding scale   SubCutaneous three times a day before meals  insulin lispro (HumaLOG) corrective regimen sliding scale   SubCutaneous at bedtime  insulin lispro Injectable (HumaLOG) 3 Unit(s) SubCutaneous three times a day before meals  metoprolol tartrate 25 milliGRAM(s) Oral two times a day  piperacillin/tazobactam IVPB. 3.375 Gram(s) IV Intermittent every 12 hours  sevelamer carbonate 1600 milliGRAM(s) Oral three times a day with meals    MEDICATIONS  (PRN):  dextrose 40% Gel 15 Gram(s) Oral once PRN Blood Glucose LESS THAN 70 milliGRAM(s)/deciliter  senna 2 Tablet(s) Oral at bedtime PRN Constipation      PHYSICAL EXAM:  VITALS: T(C): 36.3 (06-17-19 @ 11:44)  T(F): 97.4 (06-17-19 @ 11:44), Max: 98.4 (06-17-19 @ 03:50)  HR: 97 (06-17-19 @ 11:44) (70 - 97)  BP: 142/77 (06-17-19 @ 11:44) (102/57 - 142/77)  RR:  (18 - 18)  SpO2:  (98% - 100%)    GENERAL: NAD, well-groomed, well-developed, thin female  EYES: No proptosis, no injection  HEENT:  Atraumatic, Normocephalic, moist mucous membranes  THYROID: Normal size, no palpable nodules  RESPIRATORY: Improved aeration and effort. + rales bilaterally  CARDIOVASCULAR: Regular rate and rhythm; No murmurs; no peripheral edema  GI: Soft, nontender, non distended, normal bowel sounds  CUSHING'S SIGNS: no striae    POCT Blood Glucose.: 304 mg/dL (06-17-19 @ 11:43) H3+3  POCT Blood Glucose.: 451 mg/dL (06-17-19 @ 07:48) H3+5  POCT Blood Glucose.: 459 mg/dL (06-17-19 @ 07:47)    POCT Blood Glucose.: 326 mg/dL (06-16-19 @ 21:43) Lantus 4, Hum 2  POCT Blood Glucose.: 260 mg/dL (06-16-19 @ 16:36) H3+2  POCT Blood Glucose.: 242 mg/dL (06-16-19 @ 11:54) H2+2  POCT Blood Glucose.: 338 mg/dL (06-16-19 @ 08:21) H2+4  POCT Blood Glucose.: 414 mg/dL (06-16-19 @ 08:13)    POCT Blood Glucose.: 278 mg/dL (06-15-19 @ 23:17)  POCT Blood Glucose.: 276 mg/dL (06-15-19 @ 22:36)  POCT Blood Glucose.: 226 mg/dL (06-15-19 @ 20:35)    06-17    130<L>  |  83<L>  |  49<H>  ----------------------------<  444<H>  5.0   |  27  |  5.79<H>    EGFR if : 8<L>  EGFR if non : 7<L>    Ca    9.9      06-17  Mg     1.9     06-16  Phos  4.1     06-16    TPro  6.4  /  Alb  3.7  /  TBili  0.5  /  DBili  x   /  AST  52<H>  /  ALT  28  /  AlkPhos  69  06-16      Hemoglobin A1C, Whole Blood: 8.2 % <H> [4.0 - 5.6] (06-16-19 @ 13:24)  Hemoglobin A1C, Whole Blood: 8.8 % <H> [4.0 - 5.6] (04-30-19 @ 08:58)

## 2019-06-18 LAB
ANION GAP SERPL CALC-SCNC: 15 MMOL/L — SIGNIFICANT CHANGE UP (ref 5–17)
BASOPHILS # BLD AUTO: 0.03 K/UL — SIGNIFICANT CHANGE UP (ref 0–0.2)
BASOPHILS NFR BLD AUTO: 0.4 % — SIGNIFICANT CHANGE UP (ref 0–2)
BUN SERPL-MCNC: 27 MG/DL — HIGH (ref 7–23)
CALCIUM SERPL-MCNC: 9.5 MG/DL — SIGNIFICANT CHANGE UP (ref 8.4–10.5)
CHLORIDE SERPL-SCNC: 93 MMOL/L — LOW (ref 96–108)
CO2 SERPL-SCNC: 28 MMOL/L — SIGNIFICANT CHANGE UP (ref 22–31)
CREAT SERPL-MCNC: 3.59 MG/DL — HIGH (ref 0.5–1.3)
EOSINOPHIL # BLD AUTO: 0.09 K/UL — SIGNIFICANT CHANGE UP (ref 0–0.5)
EOSINOPHIL NFR BLD AUTO: 1.2 % — SIGNIFICANT CHANGE UP (ref 0–6)
GLUCOSE BLDC GLUCOMTR-MCNC: 200 MG/DL — HIGH (ref 70–99)
GLUCOSE BLDC GLUCOMTR-MCNC: 228 MG/DL — HIGH (ref 70–99)
GLUCOSE BLDC GLUCOMTR-MCNC: 292 MG/DL — HIGH (ref 70–99)
GLUCOSE BLDC GLUCOMTR-MCNC: 306 MG/DL — HIGH (ref 70–99)
GLUCOSE BLDC GLUCOMTR-MCNC: 339 MG/DL — HIGH (ref 70–99)
GLUCOSE SERPL-MCNC: 336 MG/DL — HIGH (ref 70–99)
HCT VFR BLD CALC: 27.2 % — LOW (ref 34.5–45)
HGB BLD-MCNC: 8.6 G/DL — LOW (ref 11.5–15.5)
IMM GRANULOCYTES NFR BLD AUTO: 0.4 % — SIGNIFICANT CHANGE UP (ref 0–1.5)
LYMPHOCYTES # BLD AUTO: 0.61 K/UL — LOW (ref 1–3.3)
LYMPHOCYTES # BLD AUTO: 8.3 % — LOW (ref 13–44)
MCHC RBC-ENTMCNC: 31.6 GM/DL — LOW (ref 32–36)
MCHC RBC-ENTMCNC: 33.9 PG — SIGNIFICANT CHANGE UP (ref 27–34)
MCV RBC AUTO: 107.1 FL — HIGH (ref 80–100)
MONOCYTES # BLD AUTO: 0.48 K/UL — SIGNIFICANT CHANGE UP (ref 0–0.9)
MONOCYTES NFR BLD AUTO: 6.5 % — SIGNIFICANT CHANGE UP (ref 2–14)
NEUTROPHILS # BLD AUTO: 6.11 K/UL — SIGNIFICANT CHANGE UP (ref 1.8–7.4)
NEUTROPHILS NFR BLD AUTO: 83.2 % — HIGH (ref 43–77)
PLATELET # BLD AUTO: 194 K/UL — SIGNIFICANT CHANGE UP (ref 150–400)
POTASSIUM SERPL-MCNC: 4.1 MMOL/L — SIGNIFICANT CHANGE UP (ref 3.5–5.3)
POTASSIUM SERPL-SCNC: 4.1 MMOL/L — SIGNIFICANT CHANGE UP (ref 3.5–5.3)
RBC # BLD: 2.54 M/UL — LOW (ref 3.8–5.2)
RBC # FLD: 13.2 % — SIGNIFICANT CHANGE UP (ref 10.3–14.5)
SODIUM SERPL-SCNC: 136 MMOL/L — SIGNIFICANT CHANGE UP (ref 135–145)
WBC # BLD: 7.35 K/UL — SIGNIFICANT CHANGE UP (ref 3.8–10.5)
WBC # FLD AUTO: 7.35 K/UL — SIGNIFICANT CHANGE UP (ref 3.8–10.5)

## 2019-06-18 PROCEDURE — 99233 SBSQ HOSP IP/OBS HIGH 50: CPT

## 2019-06-18 PROCEDURE — 99232 SBSQ HOSP IP/OBS MODERATE 35: CPT

## 2019-06-18 RX ORDER — OXYCODONE HYDROCHLORIDE 5 MG/1
5 TABLET ORAL ONCE
Refills: 0 | Status: DISCONTINUED | OUTPATIENT
Start: 2019-06-18 | End: 2019-06-18

## 2019-06-18 RX ORDER — INSULIN GLARGINE 100 [IU]/ML
6 INJECTION, SOLUTION SUBCUTANEOUS AT BEDTIME
Refills: 0 | Status: DISCONTINUED | OUTPATIENT
Start: 2019-06-18 | End: 2019-06-21

## 2019-06-18 RX ORDER — OXYCODONE HYDROCHLORIDE 5 MG/1
5 TABLET ORAL EVERY 6 HOURS
Refills: 0 | Status: DISCONTINUED | OUTPATIENT
Start: 2019-06-18 | End: 2019-06-20

## 2019-06-18 RX ADMIN — OXYCODONE HYDROCHLORIDE 5 MILLIGRAM(S): 5 TABLET ORAL at 17:05

## 2019-06-18 RX ADMIN — SEVELAMER CARBONATE 1600 MILLIGRAM(S): 2400 POWDER, FOR SUSPENSION ORAL at 17:05

## 2019-06-18 RX ADMIN — OXYCODONE HYDROCHLORIDE 5 MILLIGRAM(S): 5 TABLET ORAL at 17:35

## 2019-06-18 RX ADMIN — Medication 25 MILLIGRAM(S): at 17:05

## 2019-06-18 RX ADMIN — Medication 3 MILLILITER(S): at 23:17

## 2019-06-18 RX ADMIN — Medication 3 UNIT(S): at 08:06

## 2019-06-18 RX ADMIN — SEVELAMER CARBONATE 1600 MILLIGRAM(S): 2400 POWDER, FOR SUSPENSION ORAL at 08:06

## 2019-06-18 RX ADMIN — OXYCODONE HYDROCHLORIDE 5 MILLIGRAM(S): 5 TABLET ORAL at 23:43

## 2019-06-18 RX ADMIN — Medication 3 UNIT(S): at 17:05

## 2019-06-18 RX ADMIN — OXYCODONE HYDROCHLORIDE 5 MILLIGRAM(S): 5 TABLET ORAL at 09:52

## 2019-06-18 RX ADMIN — OXYCODONE HYDROCHLORIDE 5 MILLIGRAM(S): 5 TABLET ORAL at 10:30

## 2019-06-18 RX ADMIN — Medication 3 UNIT(S): at 12:27

## 2019-06-18 RX ADMIN — Medication 3 MILLILITER(S): at 17:05

## 2019-06-18 RX ADMIN — Medication 1: at 17:05

## 2019-06-18 RX ADMIN — ATORVASTATIN CALCIUM 20 MILLIGRAM(S): 80 TABLET, FILM COATED ORAL at 21:22

## 2019-06-18 RX ADMIN — CLOPIDOGREL BISULFATE 75 MILLIGRAM(S): 75 TABLET, FILM COATED ORAL at 12:27

## 2019-06-18 RX ADMIN — Medication 4: at 08:06

## 2019-06-18 RX ADMIN — AZITHROMYCIN 250 MILLIGRAM(S): 500 TABLET, FILM COATED ORAL at 21:23

## 2019-06-18 RX ADMIN — INSULIN GLARGINE 6 UNIT(S): 100 INJECTION, SOLUTION SUBCUTANEOUS at 22:08

## 2019-06-18 RX ADMIN — PIPERACILLIN AND TAZOBACTAM 25 GRAM(S): 4; .5 INJECTION, POWDER, LYOPHILIZED, FOR SOLUTION INTRAVENOUS at 05:23

## 2019-06-18 RX ADMIN — Medication 25 MILLIGRAM(S): at 05:23

## 2019-06-18 RX ADMIN — SEVELAMER CARBONATE 1600 MILLIGRAM(S): 2400 POWDER, FOR SUSPENSION ORAL at 12:27

## 2019-06-18 RX ADMIN — OXYCODONE HYDROCHLORIDE 5 MILLIGRAM(S): 5 TABLET ORAL at 00:20

## 2019-06-18 RX ADMIN — Medication 3 MILLILITER(S): at 05:24

## 2019-06-18 RX ADMIN — Medication 81 MILLIGRAM(S): at 12:27

## 2019-06-18 RX ADMIN — Medication 3: at 12:26

## 2019-06-18 RX ADMIN — PIPERACILLIN AND TAZOBACTAM 25 GRAM(S): 4; .5 INJECTION, POWDER, LYOPHILIZED, FOR SOLUTION INTRAVENOUS at 17:05

## 2019-06-18 NOTE — PROGRESS NOTE ADULT - SUBJECTIVE AND OBJECTIVE BOX
PULMONARY FOLLOW UP NOTE      NATHAN MEEKS  MRN-[id number]    Patient is a 63y old  Female who presents with a chief complaint of chest pain, SOB (2019 07:44)    Standing up watching TV, states she feels much better denies sob/cp/cough    ROS: neg    SOCIAL HISTORY  Smoking History:  40 pack year    EXAM:  Vital Signs Last 24 Hrs  T(C): 37.2 (2019 04:21), Max: 37.2 (2019 20:29)  T(F): 98.9 (2019 04:21), Max: 98.9 (2019 20:29)  HR: 77 (2019 04:21) (73 - 102)  BP: 123/73 (2019 04:21) (107/60 - 127/55)  BP(mean): --  RR: 18 (2019 04:21) (18 - 18)  SpO2: 97% (2019 04:21) (95% - 100%)    PHYSICAL EXAMINATION:    GENERAL: The patient is a female in no apparent distress.       LUNGS: faint crackles at bases    MEDICATIONS  (STANDING):  ALBUTerol/ipratropium for Nebulization 3 milliLiter(s) Nebulizer every 6 hours  aspirin enteric coated 81 milliGRAM(s) Oral daily  atorvastatin 20 milliGRAM(s) Oral at bedtime  azithromycin  IVPB 500 milliGRAM(s) IV Intermittent every 24 hours  azithromycin  IVPB      clopidogrel Tablet 75 milliGRAM(s) Oral daily  dextrose 5%. 1000 milliLiter(s) (50 mL/Hr) IV Continuous <Continuous>  dextrose 50% Injectable 12.5 Gram(s) IV Push once  dextrose 50% Injectable 25 Gram(s) IV Push once  dextrose 50% Injectable 25 Gram(s) IV Push once  docusate sodium 100 milliGRAM(s) Oral three times a day  heparin  Injectable 5000 Unit(s) SubCutaneous every 8 hours  insulin glargine Injectable (LANTUS) 5 Unit(s) SubCutaneous at bedtime  insulin lispro (HumaLOG) corrective regimen sliding scale   SubCutaneous three times a day before meals  insulin lispro (HumaLOG) corrective regimen sliding scale   SubCutaneous at bedtime  insulin lispro Injectable (HumaLOG) 3 Unit(s) SubCutaneous three times a day before meals  metoprolol tartrate 25 milliGRAM(s) Oral two times a day  piperacillin/tazobactam IVPB. 3.375 Gram(s) IV Intermittent every 12 hours  sevelamer carbonate 1600 milliGRAM(s) Oral three times a day with meals    MEDICATIONS  (PRN):  dextrose 40% Gel 15 Gram(s) Oral once PRN Blood Glucose LESS THAN 70 milliGRAM(s)/deciliter  senna 2 Tablet(s) Oral at bedtime PRN Constipation    LABS/IMAGIN.6    7.35  )-----------( 194      ( 2019 10:23 )             27.2   06-18    136  |  93<L>  |  27<H>  ----------------------------<  336<H>  4.1   |  28  |  3.59<H>    Ca    9.5      2019 05:20  Phos  5.7     06-17      < from: Xray Chest 1 View-PORTABLE IMMEDIATE (06.15.19 @ 22:06) >  New patchy left basilar opacity compatible with pneumonia.    < end of copied text >        ASSESSMENT:  62 yo female with multiple medical problems admitted for parainfluenza infection with superimposed LLL infiltrate    PLAN:  appreciate cardio & ID care  continue abx as per ID  she is not adherent to inhalers, PFTs showing moderate obstructive disease(outpatient)  will continue to discuss importance of therapy for her COPD, cont duonebs    Please feel free to call me for any questions/concerns.    Alem Tate MD  [facility] Pulmonary/Sleep Medicine  [phone]

## 2019-06-18 NOTE — PROGRESS NOTE ADULT - SUBJECTIVE AND OBJECTIVE BOX
CC: f/u for multifocal pneumonia    Patient reports she feels better today    REVIEW OF SYSTEMS:  All other review of systems negative (Comprehensive ROS)    Antimicrobials Day #  :3  azithromycin  IVPB 500 milliGRAM(s) IV Intermittent every 24 hours  azithromycin  IVPB      piperacillin/tazobactam IVPB. 3.375 Gram(s) IV Intermittent every 12 hours    Other Medications Reviewed    T(F): 97.4 (06-18-19 @ 11:59), Max: 98.9 (06-18-19 @ 04:21)  HR: 74 (06-18-19 @ 11:59)  BP: 127/72 (06-18-19 @ 11:59)  RR: 17 (06-18-19 @ 11:59)  SpO2: 99% (06-18-19 @ 11:59)  Wt(kg): --    PHYSICAL EXAM:  General: poorly interactive  no acute distress  Eyes:  anicteric, no conjunctival injection, no discharge  Oropharynx: no lesions or injection 	  Neck: supple, without adenopathy  Lungs: course  to auscultation  Heart: s1s22/6 sys m  Abdomen: soft, nondistended, nontender, without mass or organomegaly  Skin: no lesions  Extremities: no clubbing, cyanosis, or edema  Neurologic:: moves all extremities    LAB RESULTS:                        8.6    7.35  )-----------( 194      ( 18 Jun 2019 10:23 )             27.2     06-18    136  |  93<L>  |  27<H>  ----------------------------<  336<H>  4.1   |  28  |  3.59<H>    Ca    9.5      18 Jun 2019 05:20  Phos  5.7     06-17          MICROBIOLOGY:  RECENT CULTURES:  06-16 @ 00:33 .Blood     No growth to date.          RADIOLOGY REVIEWED:      < from: CT Abdomen and Pelvis No Cont (06.15.19 @ 21:57) >  IMPRESSION:     New left lower lobe and lingular lung consolidations and patchy nodular   airspace opacities in the right lower lobe concerning for multifocal   pneumonia.    Intrahepatic and extrahepatic biliary ductal dilatation, unchanged.      Assessment:  patient with esrd, severe as admitted with pneumonia  Plan:  continue zosyn and azithro for now

## 2019-06-18 NOTE — PROGRESS NOTE ADULT - SUBJECTIVE AND OBJECTIVE BOX
Cardiovascular Disease Progress Note    Overnight events: No acute events overnight.  episodes of asx junctional tachycardia overnight. this morning standing, feels well. no cp/sob/palps/dizziness  Otherwise review of systems negative    Objective Findings:  T(C): 37.2 (19 @ 04:21), Max: 37.2 (19 @ 20:29)  HR: 77 (19 @ 04:21) (73 - 102)  BP: 123/73 (19 @ 04:21) (107/60 - 142/77)  RR: 18 (19 @ 04:21) (18 - 18)  SpO2: 97% (19 @ 04:21) (95% - 100%)  Wt(kg): --  Daily     Daily Weight in k.5 (2019 07:07)      Physical Exam:  Gen: NAD  HEENT: EOMI  CV: RRR, normal S1 + S2, no m/r/g  Lungs: CTAB  Abd: soft, non-tender  Ext: No edema    Telemetry: nsr junctional tachycardia/avnrt    Laboratory Data:                        8.1    7.87  )-----------( 172      ( 2019 07:59 )             25.6     06-18    136  |  93<L>  |  27<H>  ----------------------------<  336<H>  4.1   |  28  |  3.59<H>    Ca    9.5      2019 05:20  Phos  5.7     06-17                Inpatient Medications:  MEDICATIONS  (STANDING):  ALBUTerol/ipratropium for Nebulization 3 milliLiter(s) Nebulizer every 6 hours  aspirin enteric coated 81 milliGRAM(s) Oral daily  atorvastatin 20 milliGRAM(s) Oral at bedtime  azithromycin  IVPB 500 milliGRAM(s) IV Intermittent every 24 hours  azithromycin  IVPB      clopidogrel Tablet 75 milliGRAM(s) Oral daily  dextrose 5%. 1000 milliLiter(s) (50 mL/Hr) IV Continuous <Continuous>  dextrose 50% Injectable 12.5 Gram(s) IV Push once  dextrose 50% Injectable 25 Gram(s) IV Push once  dextrose 50% Injectable 25 Gram(s) IV Push once  docusate sodium 100 milliGRAM(s) Oral three times a day  heparin  Injectable 5000 Unit(s) SubCutaneous every 8 hours  insulin glargine Injectable (LANTUS) 5 Unit(s) SubCutaneous at bedtime  insulin lispro (HumaLOG) corrective regimen sliding scale   SubCutaneous three times a day before meals  insulin lispro (HumaLOG) corrective regimen sliding scale   SubCutaneous at bedtime  insulin lispro Injectable (HumaLOG) 3 Unit(s) SubCutaneous three times a day before meals  metoprolol tartrate 25 milliGRAM(s) Oral two times a day  piperacillin/tazobactam IVPB. 3.375 Gram(s) IV Intermittent every 12 hours  sevelamer carbonate 1600 milliGRAM(s) Oral three times a day with meals      Assessment:  -SOB  -multifocal PNA  -NSVT  -pAT  -volume overload  -ischemic cardiomyopathy, cad s/p multiple stents  -esrd on hd  -PAD s/p prior intervention   -malfunctioning AV fistula      Recs:  -SOB: likely multifactorial including multifocal PNA, voume overload 2/2 ESRD. cardiomyopathy, valvular pathology. c/w volume removal with HD. c/w broad spectrum abx. f/u ID  -hypo/hyperglycemia --> f/u endo. improved  -ischemic cardiomyopathy s/p LHC with Dr Vela  --> severe and diffuse instent restenosis along RCA --> unable to stent due to multiple layers of stenting and prior brachytherapy. we can consider complex intervention if true ischemic sx develop. c/w medical treatment with anti-platelet, statins and anti-anginals for now.  would change to metop succinate 50mg daily for anti-anginal effect and for pAT/NSVT/SVT. patient declines ICD for primary prevention at this time, and agree that this would pose an increased infection risk and unlikely to be of significant benefit given overall poor prognosis and ongoing chronic medical issues. can address further as outpatient  -c/w beta blockers for nsvt/pat/cad (as above)  -hx of prolonged qtc. avoid qtc prolonging meds  -s/p recent TTE: mod-severe MR, pseudo AS (low flow-low gradient), mod-severe LV dysfunction --> recent CTS consult with dr hebert appreciated. plan is for medical management given surgical risk and patient preference  -c/w asa, plavix, statin for ischemic cardiomyopathy/CAD/PAD  -dvt ppx  -consider dispo to LTAC once cleared for discharge      Over 25 minutes spent on total encounter; more than 50% of the visit was spent counseling and/or coordinating care by the attending physician.      Julián Biswas MD   Cardiovascular Disease  [phone]

## 2019-06-18 NOTE — PROGRESS NOTE ADULT - SUBJECTIVE AND OBJECTIVE BOX
Chief Complaint/Follow-up on: T1DM    Subjective: T1DM, patient admits to eating sheila crackers through the day while inpatient, unclear quantity. Admits to eating 3 meals and HS snack and no big meals in-between those times. Continues to be on IV Antibiotics with dextrose every 12-24 hours.    MEDICATIONS  (STANDING):  ALBUTerol/ipratropium for Nebulization 3 milliLiter(s) Nebulizer every 6 hours  aspirin enteric coated 81 milliGRAM(s) Oral daily  atorvastatin 20 milliGRAM(s) Oral at bedtime  azithromycin  IVPB 500 milliGRAM(s) IV Intermittent every 24 hours  azithromycin  IVPB      clopidogrel Tablet 75 milliGRAM(s) Oral daily  dextrose 5%. 1000 milliLiter(s) (50 mL/Hr) IV Continuous <Continuous>  dextrose 50% Injectable 12.5 Gram(s) IV Push once  dextrose 50% Injectable 25 Gram(s) IV Push once  dextrose 50% Injectable 25 Gram(s) IV Push once  docusate sodium 100 milliGRAM(s) Oral three times a day  heparin  Injectable 5000 Unit(s) SubCutaneous every 8 hours  insulin glargine Injectable (LANTUS) 5 Unit(s) SubCutaneous at bedtime  insulin lispro (HumaLOG) corrective regimen sliding scale   SubCutaneous three times a day before meals  insulin lispro (HumaLOG) corrective regimen sliding scale   SubCutaneous at bedtime  insulin lispro Injectable (HumaLOG) 3 Unit(s) SubCutaneous three times a day before meals  metoprolol tartrate 25 milliGRAM(s) Oral two times a day  piperacillin/tazobactam IVPB. 3.375 Gram(s) IV Intermittent every 12 hours  sevelamer carbonate 1600 milliGRAM(s) Oral three times a day with meals    MEDICATIONS  (PRN):  dextrose 40% Gel 15 Gram(s) Oral once PRN Blood Glucose LESS THAN 70 milliGRAM(s)/deciliter  oxyCODONE    IR 5 milliGRAM(s) Oral every 6 hours PRN Severe Pain (7 - 10)  senna 2 Tablet(s) Oral at bedtime PRN Constipation      PHYSICAL EXAM:  VITALS: T(C): 36.3 (06-18-19 @ 11:59)  T(F): 97.4 (06-18-19 @ 11:59), Max: 98.9 (06-17-19 @ 20:29)  HR: 74 (06-18-19 @ 11:59) (74 - 102)  BP: 127/72 (06-18-19 @ 11:59) (114/74 - 127/72)  RR:  (17 - 18)  SpO2:  (95% - 99%)  Wt(kg): 54.4 kg  GENERAL: NAD, well-groomed, well-developed, normal weight  EYES: No proptosis, no injection  HEENT:  Atraumatic, Normocephalic, moist mucous membranes  THYROID: Normal size, no palpable nodules  RESPIRATORY: Bilateral rales, improved aeration. No wheezing  CARDIOVASCULAR: Regular rate and rhythm; No murmurs; trace peripheral edema  GI: Soft, nontender, non distended, normal bowel sounds  CUSHING'S SIGNS: no striae    POCT Blood Glucose.: 200 mg/dL (06-18-19 @ 16:46) H3+1  POCT Blood Glucose.: 292 mg/dL (06-18-19 @ 11:48) H3+3  POCT Blood Glucose.: 339 mg/dL (06-18-19 @ 07:42) H3+4  POCT Blood Glucose.: 306 mg/dL (06-18-19 @ 03:18)    POCT Blood Glucose.: 344 mg/dL (06-17-19 @ 21:55) L5, H2  POCT Blood Glucose.: 218 mg/dL (06-17-19 @ 17:30) H3+2  POCT Blood Glucose.: 304 mg/dL (06-17-19 @ 11:43) H3+3  POCT Blood Glucose.: 451 mg/dL (06-17-19 @ 07:48) H3+5  POCT Blood Glucose.: 459 mg/dL (06-17-19 @ 07:47)    POCT Blood Glucose.: 326 mg/dL (06-16-19 @ 21:43)  POCT Blood Glucose.: 260 mg/dL (06-16-19 @ 16:36)  POCT Blood Glucose.: 242 mg/dL (06-16-19 @ 11:54)  POCT Blood Glucose.: 338 mg/dL (06-16-19 @ 08:21)  POCT Blood Glucose.: 414 mg/dL (06-16-19 @ 08:13)    POCT Blood Glucose.: 278 mg/dL (06-15-19 @ 23:17)  POCT Blood Glucose.: 276 mg/dL (06-15-19 @ 22:36)  POCT Blood Glucose.: 226 mg/dL (06-15-19 @ 20:35)    06-18    136  |  93<L>  |  27<H>  ----------------------------<  336<H>  4.1   |  28  |  3.59<H>    EGFR if : 15<L>  EGFR if non : 13<L>    Ca    9.5      06-18  Mg     1.9     06-16  Phos  5.7     06-17    TPro  6.4  /  Alb  3.7  /  TBili  0.5  /  DBili  x   /  AST  52<H>  /  ALT  28  /  AlkPhos  69  06-16    Hemoglobin A1C, Whole Blood: 8.2 % <H> [4.0 - 5.6] (06-16-19 @ 13:24)  Hemoglobin A1C, Whole Blood: 8.8 % <H> [4.0 - 5.6] (04-30-19 @ 08:58)

## 2019-06-18 NOTE — PROGRESS NOTE ADULT - SUBJECTIVE AND OBJECTIVE BOX
Patient is a 63y old  Female who presents with a chief complaint of chest pain, SOB (18 Jun 2019 17:18)      SUBJECTIVE / OVERNIGHT EVENTS: feels better  Review of Systems   chest pain no  palpitations no  sob no  nausea no  headache no    MEDICATIONS  (STANDING):  ALBUTerol/ipratropium for Nebulization 3 milliLiter(s) Nebulizer every 6 hours  aspirin enteric coated 81 milliGRAM(s) Oral daily  atorvastatin 20 milliGRAM(s) Oral at bedtime  azithromycin  IVPB 500 milliGRAM(s) IV Intermittent every 24 hours  azithromycin  IVPB      clopidogrel Tablet 75 milliGRAM(s) Oral daily  dextrose 5%. 1000 milliLiter(s) (50 mL/Hr) IV Continuous <Continuous>  dextrose 50% Injectable 12.5 Gram(s) IV Push once  dextrose 50% Injectable 25 Gram(s) IV Push once  dextrose 50% Injectable 25 Gram(s) IV Push once  docusate sodium 100 milliGRAM(s) Oral three times a day  heparin  Injectable 5000 Unit(s) SubCutaneous every 8 hours  insulin glargine Injectable (LANTUS) 6 Unit(s) SubCutaneous at bedtime  insulin lispro (HumaLOG) corrective regimen sliding scale   SubCutaneous three times a day before meals  insulin lispro (HumaLOG) corrective regimen sliding scale   SubCutaneous at bedtime  insulin lispro Injectable (HumaLOG) 3 Unit(s) SubCutaneous three times a day before meals  metoprolol tartrate 25 milliGRAM(s) Oral two times a day  piperacillin/tazobactam IVPB. 3.375 Gram(s) IV Intermittent every 12 hours  sevelamer carbonate 1600 milliGRAM(s) Oral three times a day with meals    MEDICATIONS  (PRN):  dextrose 40% Gel 15 Gram(s) Oral once PRN Blood Glucose LESS THAN 70 milliGRAM(s)/deciliter  oxyCODONE    IR 5 milliGRAM(s) Oral every 6 hours PRN Severe Pain (7 - 10)  senna 2 Tablet(s) Oral at bedtime PRN Constipation      Vital Signs Last 24 Hrs  T(C): 36.3 (18 Jun 2019 11:59), Max: 37.2 (17 Jun 2019 20:29)  T(F): 97.4 (18 Jun 2019 11:59), Max: 98.9 (17 Jun 2019 20:29)  HR: 74 (18 Jun 2019 11:59) (74 - 102)  BP: 127/72 (18 Jun 2019 11:59) (114/74 - 127/72)  BP(mean): --  RR: 17 (18 Jun 2019 11:59) (17 - 18)  SpO2: 99% (18 Jun 2019 11:59) (95% - 99%)    PHYSICAL EXAM:  GENERAL: NAD, well-developed  HEAD:  Atraumatic, Normocephalic  EYES: EOMI, PERRLA, conjunctiva and sclera clear  NECK: Supple, No JVD  CHEST/LUNG: Clear to auscultation bilaterally; No wheeze  HEART: Regular rate and rhythm; No murmurs, rubs, or gallops  ABDOMEN: Soft, Nontender, Nondistended; Bowel sounds present  EXTREMITIES:  2+ Peripheral Pulses, No clubbing, cyanosis, or edema  PSYCH: AAOx3  NEUROLOGY: non-focal  SKIN: No rashes or lesions    LABS:                        8.6    7.35  )-----------( 194      ( 18 Jun 2019 10:23 )             27.2     06-18    136  |  93<L>  |  27<H>  ----------------------------<  336<H>  4.1   |  28  |  3.59<H>    Ca    9.5      18 Jun 2019 05:20  Phos  5.7     06-17                Culture - Blood (collected 16 Jun 2019 00:33)  Source: .Blood  Preliminary Report (17 Jun 2019 01:01):    No growth to date.    Culture - Blood (collected 16 Jun 2019 00:33)  Source: .Blood  Preliminary Report (17 Jun 2019 01:01):    No growth to date.        RADIOLOGY & ADDITIONAL TESTS:    Imaging Personally Reviewed:    Consultant(s) Notes Reviewed:      Care Discussed with Consultants/Other Providers:

## 2019-06-19 LAB
ANION GAP SERPL CALC-SCNC: 18 MMOL/L — HIGH (ref 5–17)
BUN SERPL-MCNC: 39 MG/DL — HIGH (ref 7–23)
CALCIUM SERPL-MCNC: 10.1 MG/DL — SIGNIFICANT CHANGE UP (ref 8.4–10.5)
CHLORIDE SERPL-SCNC: 89 MMOL/L — LOW (ref 96–108)
CO2 SERPL-SCNC: 26 MMOL/L — SIGNIFICANT CHANGE UP (ref 22–31)
CREAT SERPL-MCNC: 5.41 MG/DL — HIGH (ref 0.5–1.3)
GLUCOSE BLDC GLUCOMTR-MCNC: 176 MG/DL — HIGH (ref 70–99)
GLUCOSE BLDC GLUCOMTR-MCNC: 241 MG/DL — HIGH (ref 70–99)
GLUCOSE BLDC GLUCOMTR-MCNC: 245 MG/DL — HIGH (ref 70–99)
GLUCOSE BLDC GLUCOMTR-MCNC: 281 MG/DL — HIGH (ref 70–99)
GLUCOSE BLDC GLUCOMTR-MCNC: 326 MG/DL — HIGH (ref 70–99)
GLUCOSE SERPL-MCNC: 322 MG/DL — HIGH (ref 70–99)
HCT VFR BLD CALC: 27 % — LOW (ref 34.5–45)
HGB BLD-MCNC: 8.4 G/DL — LOW (ref 11.5–15.5)
MAGNESIUM SERPL-MCNC: 2.3 MG/DL — SIGNIFICANT CHANGE UP (ref 1.6–2.6)
MCHC RBC-ENTMCNC: 31.1 GM/DL — LOW (ref 32–36)
MCHC RBC-ENTMCNC: 33.5 PG — SIGNIFICANT CHANGE UP (ref 27–34)
MCV RBC AUTO: 107.6 FL — HIGH (ref 80–100)
PLATELET # BLD AUTO: 187 K/UL — SIGNIFICANT CHANGE UP (ref 150–400)
POTASSIUM SERPL-MCNC: 4.4 MMOL/L — SIGNIFICANT CHANGE UP (ref 3.5–5.3)
POTASSIUM SERPL-SCNC: 4.4 MMOL/L — SIGNIFICANT CHANGE UP (ref 3.5–5.3)
RBC # BLD: 2.51 M/UL — LOW (ref 3.8–5.2)
RBC # FLD: 13.2 % — SIGNIFICANT CHANGE UP (ref 10.3–14.5)
SODIUM SERPL-SCNC: 133 MMOL/L — LOW (ref 135–145)
WBC # BLD: 6.04 K/UL — SIGNIFICANT CHANGE UP (ref 3.8–10.5)
WBC # FLD AUTO: 6.04 K/UL — SIGNIFICANT CHANGE UP (ref 3.8–10.5)

## 2019-06-19 PROCEDURE — 99233 SBSQ HOSP IP/OBS HIGH 50: CPT

## 2019-06-19 PROCEDURE — 99232 SBSQ HOSP IP/OBS MODERATE 35: CPT

## 2019-06-19 PROCEDURE — 93010 ELECTROCARDIOGRAM REPORT: CPT

## 2019-06-19 RX ORDER — INSULIN LISPRO 100/ML
VIAL (ML) SUBCUTANEOUS
Refills: 0 | Status: DISCONTINUED | OUTPATIENT
Start: 2019-06-19 | End: 2019-06-21

## 2019-06-19 RX ORDER — OXYCODONE AND ACETAMINOPHEN 5; 325 MG/1; MG/1
1 TABLET ORAL ONCE
Refills: 0 | Status: DISCONTINUED | OUTPATIENT
Start: 2019-06-19 | End: 2019-06-19

## 2019-06-19 RX ADMIN — Medication 2: at 18:27

## 2019-06-19 RX ADMIN — PIPERACILLIN AND TAZOBACTAM 25 GRAM(S): 4; .5 INJECTION, POWDER, LYOPHILIZED, FOR SOLUTION INTRAVENOUS at 06:49

## 2019-06-19 RX ADMIN — Medication 81 MILLIGRAM(S): at 12:00

## 2019-06-19 RX ADMIN — OXYCODONE AND ACETAMINOPHEN 1 TABLET(S): 5; 325 TABLET ORAL at 23:30

## 2019-06-19 RX ADMIN — Medication 3 UNIT(S): at 18:28

## 2019-06-19 RX ADMIN — Medication 3: at 08:12

## 2019-06-19 RX ADMIN — Medication 100 MILLIGRAM(S): at 21:48

## 2019-06-19 RX ADMIN — SEVELAMER CARBONATE 1600 MILLIGRAM(S): 2400 POWDER, FOR SUSPENSION ORAL at 18:28

## 2019-06-19 RX ADMIN — INSULIN GLARGINE 6 UNIT(S): 100 INJECTION, SOLUTION SUBCUTANEOUS at 21:49

## 2019-06-19 RX ADMIN — ATORVASTATIN CALCIUM 20 MILLIGRAM(S): 80 TABLET, FILM COATED ORAL at 21:48

## 2019-06-19 RX ADMIN — Medication 25 MILLIGRAM(S): at 18:28

## 2019-06-19 RX ADMIN — CLOPIDOGREL BISULFATE 75 MILLIGRAM(S): 75 TABLET, FILM COATED ORAL at 12:00

## 2019-06-19 RX ADMIN — SEVELAMER CARBONATE 1600 MILLIGRAM(S): 2400 POWDER, FOR SUSPENSION ORAL at 12:01

## 2019-06-19 RX ADMIN — OXYCODONE AND ACETAMINOPHEN 1 TABLET(S): 5; 325 TABLET ORAL at 22:25

## 2019-06-19 RX ADMIN — Medication 3 UNIT(S): at 12:00

## 2019-06-19 RX ADMIN — Medication 25 MILLIGRAM(S): at 05:09

## 2019-06-19 RX ADMIN — PIPERACILLIN AND TAZOBACTAM 25 GRAM(S): 4; .5 INJECTION, POWDER, LYOPHILIZED, FOR SOLUTION INTRAVENOUS at 18:29

## 2019-06-19 RX ADMIN — Medication 1: at 12:01

## 2019-06-19 RX ADMIN — SEVELAMER CARBONATE 1600 MILLIGRAM(S): 2400 POWDER, FOR SUSPENSION ORAL at 08:12

## 2019-06-19 RX ADMIN — Medication 3 UNIT(S): at 08:12

## 2019-06-19 RX ADMIN — AZITHROMYCIN 250 MILLIGRAM(S): 500 TABLET, FILM COATED ORAL at 23:46

## 2019-06-19 NOTE — PROGRESS NOTE ADULT - SUBJECTIVE AND OBJECTIVE BOX
Cardiovascular Disease Progress Note    Overnight events: No acute events overnight. some brief episodes of asx junctional tach overnight. feels well. no cp/sob/palps/dizziness   Otherwise review of systems negative    Objective Findings:  T(C): 36.9 (06-19-19 @ 03:38), Max: 37.2 (06-19-19 @ 00:39)  HR: 105 (06-19-19 @ 03:38) (74 - 105)  BP: 131/76 (06-19-19 @ 03:38) (117/76 - 131/76)  RR: 18 (06-19-19 @ 03:38) (17 - 18)  SpO2: 100% (06-19-19 @ 03:38) (94% - 100%)  Wt(kg): --  Daily     Daily       Physical Exam:  Gen: NAD  HEENT: EOMI  CV: RRR, normal S1 + S2, no m/r/g  Lungs: CTAB  Abd: soft, non-tender  Ext: No edema    Telemetry: nsr, junctional tachycardia 100's    Laboratory Data:                        8.6    7.35  )-----------( 194      ( 18 Jun 2019 10:23 )             27.2     06-19    133<L>  |  89<L>  |  39<H>  ----------------------------<  322<H>  4.4   |  26  |  5.41<H>    Ca    10.1      19 Jun 2019 05:44  Phos  5.7     06-17                Inpatient Medications:  MEDICATIONS  (STANDING):  ALBUTerol/ipratropium for Nebulization 3 milliLiter(s) Nebulizer every 6 hours  aspirin enteric coated 81 milliGRAM(s) Oral daily  atorvastatin 20 milliGRAM(s) Oral at bedtime  azithromycin  IVPB 500 milliGRAM(s) IV Intermittent every 24 hours  azithromycin  IVPB      clopidogrel Tablet 75 milliGRAM(s) Oral daily  dextrose 5%. 1000 milliLiter(s) (50 mL/Hr) IV Continuous <Continuous>  dextrose 50% Injectable 12.5 Gram(s) IV Push once  dextrose 50% Injectable 25 Gram(s) IV Push once  dextrose 50% Injectable 25 Gram(s) IV Push once  docusate sodium 100 milliGRAM(s) Oral three times a day  heparin  Injectable 5000 Unit(s) SubCutaneous every 8 hours  insulin glargine Injectable (LANTUS) 6 Unit(s) SubCutaneous at bedtime  insulin lispro (HumaLOG) corrective regimen sliding scale   SubCutaneous three times a day before meals  insulin lispro (HumaLOG) corrective regimen sliding scale   SubCutaneous at bedtime  insulin lispro Injectable (HumaLOG) 3 Unit(s) SubCutaneous three times a day before meals  metoprolol tartrate 25 milliGRAM(s) Oral two times a day  piperacillin/tazobactam IVPB. 3.375 Gram(s) IV Intermittent every 12 hours  sevelamer carbonate 1600 milliGRAM(s) Oral three times a day with meals      Assessment:  -SOB  -multifocal PNA  -NSVT  -pAT  -volume overload  -ischemic cardiomyopathy, cad s/p multiple stents  -esrd on hd  -PAD s/p prior intervention   -malfunctioning AV fistula      Recs:  -SOB: likely multifactorial including multifocal PNA, voume overload 2/2 ESRD. cardiomyopathy, valvular pathology. c/w volume removal with HD. c/w broad spectrum abx. f/u ID  -hypo/hyperglycemia --> f/u endo. improved  -ischemic cardiomyopathy s/p LHC with Dr Vela 2/20 --> severe and diffuse instent restenosis along RCA --> unable to stent due to multiple layers of stenting and prior brachytherapy. we can consider complex intervention if true ischemic sx develop. c/w medical treatment with anti-platelet, statins and anti-anginals for now.  would change to metop succinate 50mg daily for anti-anginal effect and for pAT/NSVT/SVT. check mangesium level. patient declines ICD for primary prevention at this time, and agree that this would pose an increased infection risk and unlikely to be of significant benefit given overall poor prognosis and ongoing chronic medical issues. can address further as outpatient  -c/w beta blockers for nsvt/pat/cad (as above)  -hx of prolonged qtc. avoid qtc prolonging meds  -s/p recent TTE: mod-severe MR, pseudo AS (low flow-low gradient), mod-severe LV dysfunction --> recent CTS consult with dr hebert appreciated. plan is for medical management given surgical risk and patient preference  -c/w asa, plavix, statin for ischemic cardiomyopathy/CAD/PAD  -dvt ppx      Over 25 minutes spent on total encounter; more than 50% of the visit was spent counseling and/or coordinating care by the attending physician.      Julián Biswas MD   Cardiovascular Disease  [phone]

## 2019-06-19 NOTE — PROGRESS NOTE ADULT - PROBLEM SELECTOR PLAN 1
-test BG ac/hs and 3am every day.  -Continue Lantus 6 units at hs  -Continue Humalog 3 units ac meals. MAKE SURE PT EATS!!  -Continue adjusted Humalog correction scales ac and hs.  -Add Humalog 2 units at 3am for BG >300s   -Will follow BG in next 24 hours to assess for further insulin adjustments.   -Plan discussed with pt/team.  Contact info: [phone] (24/7). pager [phone] -test BG ac/hs and 3am every day.  -Continue Lantus 6 units at hs  -Continue Humalog 3 units ac meals. MAKE SURE PT EATS!!  -Continue adjusted Humalog correction scales ac and hs.  -Add Humalog 2 units at 3am for BG >300s   -Will follow BG in next 24 hours to assess for further insulin adjustments.   -Plan discussed with pt/team. Consider changing iv antibiotic infusion to NS instead of D5  Contact info: [phone] (24/7). pager [phone]

## 2019-06-19 NOTE — CHART NOTE - NSCHARTNOTEFT_GEN_A_CORE
Discussed with Dr. Fuentes regarding antibiotics course - pt to complete 5 day course and Dr. Fuentes see will pt in am     [phone]

## 2019-06-19 NOTE — PROGRESS NOTE ADULT - SUBJECTIVE AND OBJECTIVE BOX
PULMONARY FOLLOW UP NOTE      NATHAN MEEKS  MRN-[id number]    states she feels fine, wants to go home    ROS: neg    SOCIAL HISTORY  Smoking History:  40 pack year    EXAM:  Vital Signs Last 24 Hrs  T(C): 36.9 (2019 03:38), Max: 37.2 (2019 00:39)  T(F): 98.5 (2019 03:38), Max: 98.9 (2019 00:39)  HR: 105 (2019 03:38) (74 - 105)  BP: 131/76 (2019 03:38) (117/76 - 131/76)  BP(mean): --  RR: 18 (2019 03:38) (17 - 18)  SpO2: 100% (2019 03:38) (94% - 100%)    PHYSICAL EXAMINATION:    GENERAL: The patient is a female in no apparent distress.   LUNGS: crackles at bases L>R    MEDICATIONS  (STANDING):  ALBUTerol/ipratropium for Nebulization 3 milliLiter(s) Nebulizer every 6 hours  aspirin enteric coated 81 milliGRAM(s) Oral daily  atorvastatin 20 milliGRAM(s) Oral at bedtime  azithromycin  IVPB 500 milliGRAM(s) IV Intermittent every 24 hours  azithromycin  IVPB      clopidogrel Tablet 75 milliGRAM(s) Oral daily  dextrose 5%. 1000 milliLiter(s) (50 mL/Hr) IV Continuous <Continuous>  dextrose 50% Injectable 12.5 Gram(s) IV Push once  dextrose 50% Injectable 25 Gram(s) IV Push once  dextrose 50% Injectable 25 Gram(s) IV Push once  docusate sodium 100 milliGRAM(s) Oral three times a day  heparin  Injectable 5000 Unit(s) SubCutaneous every 8 hours  insulin glargine Injectable (LANTUS) 6 Unit(s) SubCutaneous at bedtime  insulin lispro (HumaLOG) corrective regimen sliding scale   SubCutaneous three times a day before meals  insulin lispro (HumaLOG) corrective regimen sliding scale   SubCutaneous at bedtime  insulin lispro Injectable (HumaLOG) 3 Unit(s) SubCutaneous three times a day before meals  metoprolol tartrate 25 milliGRAM(s) Oral two times a day  piperacillin/tazobactam IVPB. 3.375 Gram(s) IV Intermittent every 12 hours  sevelamer carbonate 1600 milliGRAM(s) Oral three times a day with meals    MEDICATIONS  (PRN):  dextrose 40% Gel 15 Gram(s) Oral once PRN Blood Glucose LESS THAN 70 milliGRAM(s)/deciliter  oxyCODONE    IR 5 milliGRAM(s) Oral every 6 hours PRN Severe Pain (7 - 10)  senna 2 Tablet(s) Oral at bedtime PRN Constipation      LABS/IMAGIN.4    6.04  )-----------( 187      ( 2019 08:55 )             27.0   06-19    133<L>  |  89<L>  |  39<H>  ----------------------------<  322<H>  4.4   |  26  |  5.41<H>    Ca    10.1      2019 05:44  Phos  5.7     06-17  Mg     2.3     -        < from: Xray Chest 1 View-PORTABLE IMMEDIATE (06.15.19 @ 22:06) >  New patchy left basilar opacity compatible with pneumonia.    < end of copied text >        ASSESSMENT:  62 yo female with multiple medical problems admitted for parainfluenza infection with superimposed LLL infiltrate    PLAN:  appreciate cardio & ID   continue abx as per ID  she is not adherent to inhalers, PFTs showing moderate obstructive disease(outpatient)  will continue to discuss importance of therapy for her COPD, cont duonebs    Please feel free to call me for any questions/concerns.    Alem Tate MD  [facility] Pulmonary/Sleep Medicine  [phone]

## 2019-06-19 NOTE — PROGRESS NOTE ADULT - SUBJECTIVE AND OBJECTIVE BOX
DIABETES FOLLOW UP NOTE: Saw pt earlier today  INTERVAL HX: 63 year old woman with PMH uncontrolled DM1 with multiple complications and comorbidities including ESRD on HD, well known to endo team. Recent admission with hyperglycemia which was resolved and discharged. However pt started to have uncontrolled BG levels SOB and worsening cough after discharge so came back.  Pt found to have multifocal pneumonia> on antibiotics (given on D5) and fluid overload> feeling better but still weak with on and off cough per report. States tolerating POs without any GI issues. Glycemic control remains above goal with BG 200s to 300s even though pt is getting higher insulin doses than she usually requires. No hypoglycemia      Review of Systems:  General: As above  Cardiovascular: No chest pain, palpitations  Respiratory: No SOB, positive cough  GI: No nausea, vomiting, abdominal pain  Endocrine: no polyuria, polydipsia or S&Sx of hypoglycemia    Allergies    No Known Allergies    Intolerances      MEDICATIONS:  atorvastatin 20 milliGRAM(s) Oral at bedtime  azithromycin  IVPB 500 milliGRAM(s) IV Intermittent every 24 hours  insulin glargine Injectable (LANTUS) 6 Unit(s) SubCutaneous at bedtime  insulin lispro (HumaLOG) corrective regimen sliding scale   SubCutaneous three times a day before meals  insulin lispro (HumaLOG) corrective regimen sliding scale   SubCutaneous at bedtime  insulin lispro Injectable (HumaLOG) 3 Unit(s) SubCutaneous three times a day before meals  piperacillin/tazobactam IVPB. 3.375 Gram(s) IV Intermittent every 12 hours        PHYSICAL EXAM:  VITALS: T(C): 36.9 (06-19-19 @ 11:11)  T(F): 98.5 (06-19-19 @ 11:11), Max: 98.9 (06-19-19 @ 00:39)  HR: 71 (06-19-19 @ 11:11) (71 - 105)  BP: 147/76 (06-19-19 @ 11:11) (117/76 - 147/76)  RR:  (17 - 18)  SpO2:  (94% - 100%)  Wt(kg): --  GENERAL: Female sitting in chair in NAD  Abdomen: Soft, nontender, non distended  Extremities: Warm, trace edema in LEs with tenderness at touch  NEURO: Alert and able to answer questions and follow commands.     LABS:  POCT Blood Glucose.: 176 mg/dL (06-19-19 @ 11:40)  POCT Blood Glucose.: 281 mg/dL (06-19-19 @ 07:47)  POCT Blood Glucose.: 326 mg/dL (06-19-19 @ 03:57)  POCT Blood Glucose.: 228 mg/dL (06-18-19 @ 21:33)  POCT Blood Glucose.: 200 mg/dL (06-18-19 @ 16:46)  POCT Blood Glucose.: 292 mg/dL (06-18-19 @ 11:48)  POCT Blood Glucose.: 339 mg/dL (06-18-19 @ 07:42)  POCT Blood Glucose.: 306 mg/dL (06-18-19 @ 03:18)  POCT Blood Glucose.: 344 mg/dL (06-17-19 @ 21:55)  POCT Blood Glucose.: 218 mg/dL (06-17-19 @ 17:30)  POCT Blood Glucose.: 304 mg/dL (06-17-19 @ 11:43)  POCT Blood Glucose.: 451 mg/dL (06-17-19 @ 07:48)  POCT Blood Glucose.: 459 mg/dL (06-17-19 @ 07:47)  POCT Blood Glucose.: 326 mg/dL (06-16-19 @ 21:43)  POCT Blood Glucose.: 260 mg/dL (06-16-19 @ 16:36)                          8.4    6.04  )-----------( 187      ( 19 Jun 2019 08:55 )             27.0       06-19    133<L>  |  89<L>  |  39<H>  ----------------------------<  322<H>  4.4   |  26  |  5.41<H>      EGFR if non : 8<L>    Ca    10.1      06-19  Mg     2.3     06-19  Phos  5.7     06-17       Hemoglobin A1C, Whole Blood: 8.2 % <H> [4.0 - 5.6] (06-16-19 @ 13:24)  Hemoglobin A1C, Whole Blood: 8.8 % <H> [4.0 - 5.6] (04-30-19 @ 08:58)

## 2019-06-19 NOTE — PROGRESS NOTE ADULT - SUBJECTIVE AND OBJECTIVE BOX
Patient is a 63y old  Female who presents with a chief complaint of chest pain, SOB> PNA (19 Jun 2019 14:14)      SUBJECTIVE / OVERNIGHT EVENTS: feels better  Review of Systems  chest pain no  palpitations no  sob no  nausea no  headache no    MEDICATIONS  (STANDING):  ALBUTerol/ipratropium for Nebulization 3 milliLiter(s) Nebulizer every 6 hours  aspirin enteric coated 81 milliGRAM(s) Oral daily  atorvastatin 20 milliGRAM(s) Oral at bedtime  azithromycin  IVPB 500 milliGRAM(s) IV Intermittent every 24 hours  azithromycin  IVPB      clopidogrel Tablet 75 milliGRAM(s) Oral daily  dextrose 5%. 1000 milliLiter(s) (50 mL/Hr) IV Continuous <Continuous>  dextrose 50% Injectable 12.5 Gram(s) IV Push once  dextrose 50% Injectable 25 Gram(s) IV Push once  dextrose 50% Injectable 25 Gram(s) IV Push once  docusate sodium 100 milliGRAM(s) Oral three times a day  heparin  Injectable 5000 Unit(s) SubCutaneous every 8 hours  insulin glargine Injectable (LANTUS) 6 Unit(s) SubCutaneous at bedtime  insulin lispro (HumaLOG) corrective regimen sliding scale   SubCutaneous <User Schedule>  insulin lispro (HumaLOG) corrective regimen sliding scale   SubCutaneous three times a day before meals  insulin lispro (HumaLOG) corrective regimen sliding scale   SubCutaneous at bedtime  insulin lispro Injectable (HumaLOG) 3 Unit(s) SubCutaneous three times a day before meals  metoprolol tartrate 25 milliGRAM(s) Oral two times a day  piperacillin/tazobactam IVPB. 3.375 Gram(s) IV Intermittent every 12 hours  sevelamer carbonate 1600 milliGRAM(s) Oral three times a day with meals    MEDICATIONS  (PRN):  dextrose 40% Gel 15 Gram(s) Oral once PRN Blood Glucose LESS THAN 70 milliGRAM(s)/deciliter  oxyCODONE    IR 5 milliGRAM(s) Oral every 6 hours PRN Severe Pain (7 - 10)  senna 2 Tablet(s) Oral at bedtime PRN Constipation      Vital Signs Last 24 Hrs  T(C): 36.5 (19 Jun 2019 17:10), Max: 37.2 (19 Jun 2019 00:39)  T(F): 97.7 (19 Jun 2019 17:10), Max: 98.9 (19 Jun 2019 00:39)  HR: 80 (19 Jun 2019 17:10) (71 - 105)  BP: 149/72 (19 Jun 2019 17:10) (117/76 - 149/72)  BP(mean): --  RR: 17 (19 Jun 2019 17:10) (17 - 18)  SpO2: 97% (19 Jun 2019 17:10) (94% - 100%)    PHYSICAL EXAM:  GENERAL: NAD, well-developed  HEAD:  Atraumatic, Normocephalic  EYES: EOMI, PERRLA, conjunctiva and sclera clear  NECK: Supple, No JVD  CHEST/LUNG: crackles to auscultation bilaterally; No wheeze  HEART: Regular rate and rhythm; No murmurs, rubs, or gallops  ABDOMEN: Soft, Nontender, Nondistended; Bowel sounds present  EXTREMITIES:  2+ Peripheral Pulses, No clubbing, cyanosis, or edema  PSYCH: AAOx3  NEUROLOGY: non-focal  SKIN: No rashes or lesions    LABS:                        8.4    6.04  )-----------( 187      ( 19 Jun 2019 08:55 )             27.0     06-19    133<L>  |  89<L>  |  39<H>  ----------------------------<  322<H>  4.4   |  26  |  5.41<H>    Ca    10.1      19 Jun 2019 05:44  Mg     2.3     06-19                  RADIOLOGY & ADDITIONAL TESTS:    Imaging Personally Reviewed:    Consultant(s) Notes Reviewed:      Care Discussed with Consultants/Other Providers:

## 2019-06-19 NOTE — CHART NOTE - NSCHARTNOTEFT_GEN_A_CORE
MEDICINE NP    NATHAN MEEKS  63y Female    Patient is a 63y old  Female who presents with a chief complaint of chest pain, SOB (18 Jun 2019 20:43)       > Event Summary:  Now notified by RN, Patient with recurrent Junctional Rhythm  on Tele starting @ 10:30PM, asymptomatic  Vital Signs :  T(C): 37.2 T(F): 98.9 HR: 102 BP: 117/76  RR: 18 SpO2: 97% (19 Jun 2019 00:39)  -Patient seen at bedside, AAOX3, NAD. Denies chest pain, sob, palpitations.  -ECG obtain, now back to NSR.  SR 78bpm.  No acute st changes from prior  -C/w current regimen of  Metoprolol PO   -f/u BMP /Lytes  -F/u with Cardiology in AM  -Attending to follow  -Will c/w close monitoring and endorse to incoming day team        ALEKSEY Jean-BC  Medicine Department  #[id number]

## 2019-06-19 NOTE — PROGRESS NOTE ADULT - SUBJECTIVE AND OBJECTIVE BOX
[facility]   [phone]  RENAL FOLLOW UP NOTE  --------------------------------------------------------------------------------  Chief Complaint:    24 hour events/subjective:    states coughing is better.     PAST HISTORY  --------------------------------------------------------------------------------  No significant changes to PMH, PSH, FHx, SHx, unless otherwise noted    ALLERGIES & MEDICATIONS  --------------------------------------------------------------------------------  Allergies    No Known Allergies    Intolerances      Standing Inpatient Medications  ALBUTerol/ipratropium for Nebulization 3 milliLiter(s) Nebulizer every 6 hours  aspirin enteric coated 81 milliGRAM(s) Oral daily  atorvastatin 20 milliGRAM(s) Oral at bedtime  azithromycin  IVPB 500 milliGRAM(s) IV Intermittent every 24 hours  azithromycin  IVPB      clopidogrel Tablet 75 milliGRAM(s) Oral daily  dextrose 5%. 1000 milliLiter(s) IV Continuous <Continuous>  dextrose 50% Injectable 12.5 Gram(s) IV Push once  dextrose 50% Injectable 25 Gram(s) IV Push once  dextrose 50% Injectable 25 Gram(s) IV Push once  docusate sodium 100 milliGRAM(s) Oral three times a day  heparin  Injectable 5000 Unit(s) SubCutaneous every 8 hours  insulin glargine Injectable (LANTUS) 6 Unit(s) SubCutaneous at bedtime  insulin lispro (HumaLOG) corrective regimen sliding scale   SubCutaneous <User Schedule>  insulin lispro (HumaLOG) corrective regimen sliding scale   SubCutaneous three times a day before meals  insulin lispro (HumaLOG) corrective regimen sliding scale   SubCutaneous at bedtime  insulin lispro Injectable (HumaLOG) 3 Unit(s) SubCutaneous three times a day before meals  metoprolol tartrate 25 milliGRAM(s) Oral two times a day  oxyCODONE    5 mG/acetaminophen 325 mG 1 Tablet(s) Oral once  piperacillin/tazobactam IVPB. 3.375 Gram(s) IV Intermittent every 12 hours  sevelamer carbonate 1600 milliGRAM(s) Oral three times a day with meals    PRN Inpatient Medications  dextrose 40% Gel 15 Gram(s) Oral once PRN  oxyCODONE    IR 5 milliGRAM(s) Oral every 6 hours PRN  senna 2 Tablet(s) Oral at bedtime PRN      REVIEW OF SYSTEMS  --------------------------------------------------------------------------------    Gen: denies fevers/chills,  CVS: denies chest pain/palpitations  Resp: denies SOB/Cough +   GI: Denies N/V/Abd pain  : Denies dysuria  c/o edema LLE     All other systems were reviewed and are negative, except as noted.    VITALS/PHYSICAL EXAM  --------------------------------------------------------------------------------  T(C): 36.9 (06-19-19 @ 21:11), Max: 37.2 (06-19-19 @ 00:39)  HR: 80 (06-19-19 @ 21:11) (71 - 105)  BP: 130/68 (06-19-19 @ 21:11) (117/76 - 149/72)  RR: 18 (06-19-19 @ 21:11) (17 - 18)  SpO2: 95% (06-19-19 @ 21:11) (95% - 100%)  Wt(kg): --        06-18-19 @ 07:01  -  06-19-19 @ 07:00  --------------------------------------------------------  IN: 540 mL / OUT: 0 mL / NET: 540 mL    06-19-19 @ 07:01  -  06-19-19 @ 22:01  --------------------------------------------------------  IN: 1020 mL / OUT: 2000 mL / NET: -980 mL      Physical Exam:  	  	Gen:  more comfortable appearing  	no jvd ,  	Pulm: decrease bs  no rales  + ronchi   no wheezing  	CV: RRR, S1S2; no rub  	Abd: +BS, soft, nontender/nondistended  	: No suprapubic tenderness  	UE: Warm, no cyanosis  no clubbing,  no edema;  	LE: Warm, no cyanosis  no clubbing, 1+  edema LLE   	Neuro: alert and oriented. speech coherentt   	       LABS/STUDIES  --------------------------------------------------------------------------------              8.4    6.04  >-----------<  187      [06-19-19 @ 08:55]              27.0     133  |  89  |  39  ----------------------------<  322      [06-19-19 @ 05:44]  4.4   |  26  |  5.41        Ca     10.1     [06-19-19 @ 05:44]      Mg     2.3     [06-19-19 @ 05:44]            Creatinine Trend:  SCr 5.41 [06-19 @ 05:44]  SCr 3.59 [06-18 @ 05:20]  SCr 5.79 [06-17 @ 06:49]  SCr 4.20 [06-16 @ 07:11]  SCr 3.45 [06-15 @ 21:03]                  Iron 42, TIBC 253, %sat 17      [06-17-19 @ 17:54]  Ferritin 1838      [06-17-19 @ 18:06]  PTH -- (Ca 9.6)      [06-17-19 @ 18:06]   177  PTH -- (Ca 7.8)      [03-29-19 @ 20:38]   73  PTH -- (Ca 7.3)      [02-22-19 @ 02:49]   59  HbA1c 8.2      [06-16-19 @ 13:24]  TSH 0.71      [11-17-18 @ 08:25]

## 2019-06-20 LAB
ANION GAP SERPL CALC-SCNC: 15 MMOL/L — SIGNIFICANT CHANGE UP (ref 5–17)
B-OH-BUTYR SERPL-SCNC: 0.6 MMOL/L — HIGH
BASE EXCESS BLDV CALC-SCNC: 5.4 MMOL/L — HIGH (ref -2–2)
BUN SERPL-MCNC: 22 MG/DL — SIGNIFICANT CHANGE UP (ref 7–23)
CALCIUM SERPL-MCNC: 9.5 MG/DL — SIGNIFICANT CHANGE UP (ref 8.4–10.5)
CHLORIDE SERPL-SCNC: 93 MMOL/L — LOW (ref 96–108)
CO2 BLDV-SCNC: 31 MMOL/L — HIGH (ref 22–30)
CO2 SERPL-SCNC: 27 MMOL/L — SIGNIFICANT CHANGE UP (ref 22–31)
CREAT SERPL-MCNC: 3.49 MG/DL — HIGH (ref 0.5–1.3)
GAS PNL BLDV: SIGNIFICANT CHANGE UP
GLUCOSE BLDC GLUCOMTR-MCNC: 128 MG/DL — HIGH (ref 70–99)
GLUCOSE BLDC GLUCOMTR-MCNC: 153 MG/DL — HIGH (ref 70–99)
GLUCOSE BLDC GLUCOMTR-MCNC: 341 MG/DL — HIGH (ref 70–99)
GLUCOSE BLDC GLUCOMTR-MCNC: 438 MG/DL — HIGH (ref 70–99)
GLUCOSE BLDC GLUCOMTR-MCNC: 448 MG/DL — HIGH (ref 70–99)
GLUCOSE BLDC GLUCOMTR-MCNC: 478 MG/DL — CRITICAL HIGH (ref 70–99)
GLUCOSE BLDC GLUCOMTR-MCNC: 481 MG/DL — CRITICAL HIGH (ref 70–99)
GLUCOSE BLDC GLUCOMTR-MCNC: 565 MG/DL — CRITICAL HIGH (ref 70–99)
GLUCOSE BLDC GLUCOMTR-MCNC: >600 MG/DL — CRITICAL HIGH (ref 70–99)
GLUCOSE SERPL-MCNC: 504 MG/DL — CRITICAL HIGH (ref 70–99)
HCO3 BLDV-SCNC: 30 MMOL/L — HIGH (ref 21–29)
HCT VFR BLD CALC: 27.2 % — LOW (ref 34.5–45)
HGB BLD-MCNC: 9.4 G/DL — LOW (ref 11.5–15.5)
LACTATE BLDV-MCNC: 1.1 MMOL/L — SIGNIFICANT CHANGE UP (ref 0.7–2)
MAGNESIUM SERPL-MCNC: 2.1 MG/DL — SIGNIFICANT CHANGE UP (ref 1.6–2.6)
MCHC RBC-ENTMCNC: 34.6 GM/DL — SIGNIFICANT CHANGE UP (ref 32–36)
MCHC RBC-ENTMCNC: 36 PG — HIGH (ref 27–34)
MCV RBC AUTO: 104 FL — HIGH (ref 80–100)
PCO2 BLDV: 46 MMHG — SIGNIFICANT CHANGE UP (ref 35–50)
PH BLDV: 7.43 — SIGNIFICANT CHANGE UP (ref 7.35–7.45)
PHOSPHATE SERPL-MCNC: 3.5 MG/DL — SIGNIFICANT CHANGE UP (ref 2.5–4.5)
PLATELET # BLD AUTO: 198 K/UL — SIGNIFICANT CHANGE UP (ref 150–400)
PO2 BLDV: 47 MMHG — HIGH (ref 25–45)
POTASSIUM SERPL-MCNC: 4.1 MMOL/L — SIGNIFICANT CHANGE UP (ref 3.5–5.3)
POTASSIUM SERPL-SCNC: 4.1 MMOL/L — SIGNIFICANT CHANGE UP (ref 3.5–5.3)
RBC # BLD: 2.61 M/UL — LOW (ref 3.8–5.2)
RBC # FLD: 12.2 % — SIGNIFICANT CHANGE UP (ref 10.3–14.5)
SAO2 % BLDV: 79 % — SIGNIFICANT CHANGE UP (ref 67–88)
SODIUM SERPL-SCNC: 135 MMOL/L — SIGNIFICANT CHANGE UP (ref 135–145)
WBC # BLD: 4.8 K/UL — SIGNIFICANT CHANGE UP (ref 3.8–10.5)
WBC # FLD AUTO: 4.8 K/UL — SIGNIFICANT CHANGE UP (ref 3.8–10.5)

## 2019-06-20 PROCEDURE — 99232 SBSQ HOSP IP/OBS MODERATE 35: CPT

## 2019-06-20 RX ORDER — PIPERACILLIN AND TAZOBACTAM 4; .5 G/20ML; G/20ML
3.38 INJECTION, POWDER, LYOPHILIZED, FOR SOLUTION INTRAVENOUS EVERY 12 HOURS
Refills: 0 | Status: DISCONTINUED | OUTPATIENT
Start: 2019-06-20 | End: 2019-06-20

## 2019-06-20 RX ORDER — AZITHROMYCIN 500 MG/1
500 TABLET, FILM COATED ORAL EVERY 24 HOURS
Refills: 0 | Status: DISCONTINUED | OUTPATIENT
Start: 2019-06-20 | End: 2019-06-20

## 2019-06-20 RX ORDER — OXYCODONE AND ACETAMINOPHEN 5; 325 MG/1; MG/1
1 TABLET ORAL EVERY 6 HOURS
Refills: 0 | Status: DISCONTINUED | OUTPATIENT
Start: 2019-06-20 | End: 2019-06-21

## 2019-06-20 RX ORDER — INSULIN LISPRO 100/ML
2 VIAL (ML) SUBCUTANEOUS AT BEDTIME
Refills: 0 | Status: DISCONTINUED | OUTPATIENT
Start: 2019-06-20 | End: 2019-06-21

## 2019-06-20 RX ORDER — ERYTHROPOIETIN 10000 [IU]/ML
10000 INJECTION, SOLUTION INTRAVENOUS; SUBCUTANEOUS ONCE
Refills: 0 | Status: COMPLETED | OUTPATIENT
Start: 2019-06-20 | End: 2019-06-20

## 2019-06-20 RX ADMIN — ERYTHROPOIETIN 10000 UNIT(S): 10000 INJECTION, SOLUTION INTRAVENOUS; SUBCUTANEOUS at 14:58

## 2019-06-20 RX ADMIN — Medication 81 MILLIGRAM(S): at 12:41

## 2019-06-20 RX ADMIN — INSULIN GLARGINE 6 UNIT(S): 100 INJECTION, SOLUTION SUBCUTANEOUS at 22:07

## 2019-06-20 RX ADMIN — Medication 6: at 09:04

## 2019-06-20 RX ADMIN — HEPARIN SODIUM 5000 UNIT(S): 5000 INJECTION INTRAVENOUS; SUBCUTANEOUS at 22:08

## 2019-06-20 RX ADMIN — OXYCODONE AND ACETAMINOPHEN 1 TABLET(S): 5; 325 TABLET ORAL at 09:55

## 2019-06-20 RX ADMIN — ATORVASTATIN CALCIUM 20 MILLIGRAM(S): 80 TABLET, FILM COATED ORAL at 22:07

## 2019-06-20 RX ADMIN — OXYCODONE AND ACETAMINOPHEN 1 TABLET(S): 5; 325 TABLET ORAL at 08:55

## 2019-06-20 RX ADMIN — SEVELAMER CARBONATE 1600 MILLIGRAM(S): 2400 POWDER, FOR SUSPENSION ORAL at 18:52

## 2019-06-20 RX ADMIN — Medication 100 MILLIGRAM(S): at 05:21

## 2019-06-20 RX ADMIN — Medication 3 UNIT(S): at 12:40

## 2019-06-20 RX ADMIN — Medication 2 UNIT(S): at 22:08

## 2019-06-20 RX ADMIN — Medication 25 MILLIGRAM(S): at 05:21

## 2019-06-20 RX ADMIN — SEVELAMER CARBONATE 1600 MILLIGRAM(S): 2400 POWDER, FOR SUSPENSION ORAL at 12:41

## 2019-06-20 RX ADMIN — SEVELAMER CARBONATE 1600 MILLIGRAM(S): 2400 POWDER, FOR SUSPENSION ORAL at 09:03

## 2019-06-20 RX ADMIN — Medication 2: at 03:53

## 2019-06-20 RX ADMIN — OXYCODONE AND ACETAMINOPHEN 1 TABLET(S): 5; 325 TABLET ORAL at 18:55

## 2019-06-20 RX ADMIN — Medication 3 UNIT(S): at 09:03

## 2019-06-20 RX ADMIN — PIPERACILLIN AND TAZOBACTAM 25 GRAM(S): 4; .5 INJECTION, POWDER, LYOPHILIZED, FOR SOLUTION INTRAVENOUS at 05:21

## 2019-06-20 RX ADMIN — Medication 25 MILLIGRAM(S): at 18:52

## 2019-06-20 RX ADMIN — Medication 4: at 12:40

## 2019-06-20 RX ADMIN — Medication 100 MILLIGRAM(S): at 22:07

## 2019-06-20 RX ADMIN — Medication 3 UNIT(S): at 18:52

## 2019-06-20 RX ADMIN — PIPERACILLIN AND TAZOBACTAM 25 GRAM(S): 4; .5 INJECTION, POWDER, LYOPHILIZED, FOR SOLUTION INTRAVENOUS at 18:52

## 2019-06-20 NOTE — PROVIDER CONTACT NOTE (OTHER) - BACKGROUND
Pt admitted for multifocal PNA, pt on contact precautions for parainfluenza. Hx of Type 1 DM, insulin dependent, ESRD, CHF, and COPD.

## 2019-06-20 NOTE — CHART NOTE - NSCHARTNOTEFT_GEN_A_CORE
MEDICINE NP    NATHAN MEEKS  63y Female    Patient is a 63y old  Female who presents with a chief complaint of chest pain, SOB (19 Jun 2019 22:01)       > Event Summary:  3AM  Notified by RN, Patient with critical value @3AM BG >600 / 565. States patient reports eating a sandwich ~1AM (approximately 3hr prior).  Vital Signs :  T(C): 36.9 T(F): 98.5 HR: 103 BP: 133/76 RR: 18 SpO2: 95% (20 Jun 2019 04:49)   Patient seen at bedside, sleepy, but alert and oriented.  Denies polydipsia, blurry vision, headache.    Exam w/ AAOX3, +Regular rate and rhythm, Resp even and unlabored. Skin clean and dry.      CAPILLARY BLOOD GLUCOSE  POCT Blood Glucose.: >600 mg/dL (20 Jun 2019 03:15)  POCT Blood Glucose.: 565 mg/dL (20 Jun 2019 03:14)  POCT Blood Glucose.: 241 mg/dL (19 Jun 2019 21:32)    > A/P:   Patient is a 63F hx ESRD (on HD M/T/T/Sa), DM1 c/b neuropathy and retinopathy, poor medical mgmt at home, frequent hospitalizations (usually 1-3 times a month) for DKA, Hyperglycemia.  Admitted with Sepsis/ HCPA / Parainfluenza.  Now with Hyperglycemia    1) Hyperglycemia : Likely 2/2 prior meal vs Concern for DKA given DMT1   -f/u STAT BMP/ BHBT / Lactate  -C/w Corrective sliding scale for 3AM   -D/w Endocrine fellow on call, likely not DKA as patient received Lantus @HS, Patient with known labile BG, c/w corrective sliding scale.  -Will endorse to incoming day team and Attending to follow              ALEKSEY Jean-BC  Medicine Department  #[id number]    >>ADDEND:   (20 Jun 2019 05:30)  -F/u Labs STAT labs, wnl, not DKA  -F/u Repeat POCT Blood Glucose.: 448 mg/dL, downtrending  -No further management at present  -C/w FS AC / HS w/ corrective sliding scale      Kathleen Bhatia PERFECTO-BC  Medicine Department  #[id number]

## 2019-06-20 NOTE — PROGRESS NOTE ADULT - SUBJECTIVE AND OBJECTIVE BOX
CC: f/u for pneumonia    Patient reports she feels great    REVIEW OF SYSTEMS:  All other review of systems negative (Comprehensive ROS)    Antimicrobials Day #  :6  piperacillin/tazobactam IVPB.. 3.375 Gram(s) IV Intermittent every 12 hours    Other Medications Reviewed    T(F): 98.5 (06-20-19 @ 04:49), Max: 98.5 (06-19-19 @ 11:11)  HR: 103 (06-20-19 @ 04:49)  BP: 133/76 (06-20-19 @ 04:49)  RR: 18 (06-20-19 @ 04:49)  SpO2: 95% (06-20-19 @ 04:49)  Wt(kg): --    PHYSICAL EXAM:  General: alert, no acute distress  Eyes:  anicteric, no conjunctival injection, no discharge  Oropharynx: no lesions or injection 	  Neck: supple, without adenopathy  Lungs: basilar rales to auscultation  Heart: s1s2 2/6 sys m  Abdomen: soft, nondistended, nontender, without mass or organomegaly  Skin: no lesions  Extremities: no clubbing, cyanosis,. left leg edema  Neurologic: alert, oriented, moves all extremities    LAB RESULTS:                        9.4    4.8   )-----------( 198      ( 20 Jun 2019 03:59 )             27.2     06-20    135  |  93<L>  |  22  ----------------------------<  504<HH>  4.1   |  27  |  3.49<H>    Ca    9.5      20 Jun 2019 03:59  Phos  3.5     06-20  Mg     2.1     06-20          MICROBIOLOGY:  RECENT CULTURES:  06-16 @ 00:33 .Blood     No growth to date.          RADIOLOGY REVIEWED:    < from: CT Abdomen and Pelvis No Cont (06.15.19 @ 21:57) >    IMPRESSION:     New left lower lobe and lingular lung consolidations and patchy nodular   airspace opacities in the right lower lobe concerning for multifocal   pneumonia.    Intrahepatic and extrahepatic biliary ductal dilatation, unchanged.      < end of copied text >    Assessment:  Patient with pseudo as, severe mr, no surgery planned, esrd on hd admitted with pneumonia, tested positive for parainfluenza, clinically much improved after 5 days of azithro and now day 6 zosyn  Plan:  can stop zithromax  can stop zosyn after today  No ID need for continued stay for iv antibiotics but now with high sugar so not clear about dc yet but defer to primary team

## 2019-06-20 NOTE — PROGRESS NOTE ADULT - SUBJECTIVE AND OBJECTIVE BOX
Diabetes Follow up note:  Interval Hx:  63 year old woman with PMH uncontrolled DM1 with multiple complications and comorbidities including ESRD on HD, well known to endo team here w/multi-focal pneumonia. BG values > 600 overnight, 400s this AM. Pt seen at bedside. Reports she ate a sandwich at bedtime and wasn't given any insulin. Reports feeling better and "I'm going home today!". Scheduled for HD today.     Review of Systems:  General: denies pain   GI: Tolerating POs without any N/V/D/ABD PAIN.  CV: No CP/SOB  ENDO: No S&Sx of hypoglycemia.   MEDS:  atorvastatin 20 milliGRAM(s) Oral at bedtime    insulin glargine Injectable (LANTUS) 6 Unit(s) SubCutaneous at bedtime  insulin lispro (HumaLOG) corrective regimen sliding scale   SubCutaneous <User Schedule>  insulin lispro (HumaLOG) corrective regimen sliding scale   SubCutaneous three times a day before meals  insulin lispro (HumaLOG) corrective regimen sliding scale   SubCutaneous at bedtime  insulin lispro Injectable (HumaLOG) 3 Unit(s) SubCutaneous three times a day before meals    piperacillin/tazobactam IVPB.. 3.375 Gram(s) IV Intermittent every 12 hours    Allergies    No Known Allergies          PE:  General: Female sitting in chair. NAD.   Vital Signs Last 24 Hrs  T(C): 36.9 (20 Jun 2019 04:49), Max: 36.9 (19 Jun 2019 11:11)  T(F): 98.5 (20 Jun 2019 04:49), Max: 98.5 (19 Jun 2019 11:11)  HR: 103 (20 Jun 2019 04:49) (71 - 103)  BP: 133/76 (20 Jun 2019 04:49) (123/71 - 149/72)  BP(mean): --  RR: 18 (20 Jun 2019 04:49) (17 - 18)  SpO2: 95% (20 Jun 2019 04:49) (95% - 98%)  Abd: Soft, NT,ND,   Extremities: Warm. trace LE edema  Neuro: Awake and alert. Able to answer questions.     LABS:    POCT Blood Glucose.: 481 mg/dL (06-20-19 @ 09:02)  POCT Blood Glucose.: 478 mg/dL (06-20-19 @ 07:46)  POCT Blood Glucose.: 438 mg/dL (06-20-19 @ 07:45)  POCT Blood Glucose.: 448 mg/dL (06-20-19 @ 05:13)  POCT Blood Glucose.: >600 mg/dL (06-20-19 @ 03:15)  POCT Blood Glucose.: 565 mg/dL (06-20-19 @ 03:14)  POCT Blood Glucose.: 241 mg/dL (06-19-19 @ 21:32)  POCT Blood Glucose.: 245 mg/dL (06-19-19 @ 18:26)  POCT Blood Glucose.: 176 mg/dL (06-19-19 @ 11:40)  POCT Blood Glucose.: 281 mg/dL (06-19-19 @ 07:47)  POCT Blood Glucose.: 326 mg/dL (06-19-19 @ 03:57)  POCT Blood Glucose.: 228 mg/dL (06-18-19 @ 21:33)  POCT Blood Glucose.: 200 mg/dL (06-18-19 @ 16:46)  POCT Blood Glucose.: 292 mg/dL (06-18-19 @ 11:48)  POCT Blood Glucose.: 339 mg/dL (06-18-19 @ 07:42)  POCT Blood Glucose.: 306 mg/dL (06-18-19 @ 03:18)  POCT Blood Glucose.: 344 mg/dL (06-17-19 @ 21:55)  POCT Blood Glucose.: 218 mg/dL (06-17-19 @ 17:30)  POCT Blood Glucose.: 304 mg/dL (06-17-19 @ 11:43)                            9.4    4.8   )-----------( 198      ( 20 Jun 2019 03:59 )             27.2       06-20    135  |  93<L>  |  22  ----------------------------<  504<HH>  4.1   |  27  |  3.49<H>    Ca    9.5      20 Jun 2019 03:59  Phos  3.5     06-20  Mg     2.1     06-20          Hemoglobin A1C, Whole Blood: 8.2 % <H> [4.0 - 5.6] (06-16-19 @ 13:24)  Hemoglobin A1C, Whole Blood: 8.8 % <H> [4.0 - 5.6] (04-30-19 @ 08:58)            Contact number: isabel [phone] or [phone]

## 2019-06-20 NOTE — PROVIDER CONTACT NOTE (OTHER) - ACTION/TREATMENT ORDERED:
NP notified and aware. NP to contact Endocrine for recommendations regarding insulin coverage
perform ekg. continue to monitor pt.
NP to bedside to assess, Order stat labs, ABG, and a venous lactate. Administer humalog per sliding scale, recheck f/s an hour after giving humalog.

## 2019-06-20 NOTE — PROVIDER CONTACT NOTE (OTHER) - ASSESSMENT
pt had no complaints of chest pain/sob/dizziness. pt resting comfortably on bed
A&Ox4, VSS
Pt appears asleep, but easily aroused, able to follow commands. denies c/o SOB.

## 2019-06-20 NOTE — PROGRESS NOTE ADULT - SUBJECTIVE AND OBJECTIVE BOX
PULMONARY FOLLOW UP NOTE      NATHAN MEEKS  MRN-[id number]    states she feels fine, wants to go home    ROS: neg    SOCIAL HISTORY  Smoking History:  40 pack year    EXAM:  Vital Signs Last 24 Hrs  T(C): 36.9 (2019 04:49), Max: 36.9 (2019 11:11)  T(F): 98.5 (2019 04:49), Max: 98.5 (2019 11:11)  HR: 103 (2019 04:49) (71 - 103)  BP: 133/76 (2019 04:49) (123/71 - 149/72)  BP(mean): --  RR: 18 (2019 04:49) (17 - 18)  SpO2: 95% (2019 04:49) (95% - 98%)    PHYSICAL EXAMINATION:    GENERAL: The patient is a female in no apparent distress.   LUNGS: clear    MEDICATIONS  (STANDING):  ALBUTerol/ipratropium for Nebulization 3 milliLiter(s) Nebulizer every 6 hours  aspirin enteric coated 81 milliGRAM(s) Oral daily  atorvastatin 20 milliGRAM(s) Oral at bedtime  dextrose 5%. 1000 milliLiter(s) (50 mL/Hr) IV Continuous <Continuous>  dextrose 50% Injectable 12.5 Gram(s) IV Push once  dextrose 50% Injectable 25 Gram(s) IV Push once  dextrose 50% Injectable 25 Gram(s) IV Push once  docusate sodium 100 milliGRAM(s) Oral three times a day  epoetin azalea Injectable 78780 Unit(s) IV Push once  heparin  Injectable 5000 Unit(s) SubCutaneous every 8 hours  insulin glargine Injectable (LANTUS) 6 Unit(s) SubCutaneous at bedtime  insulin lispro (HumaLOG) corrective regimen sliding scale   SubCutaneous <User Schedule>  insulin lispro (HumaLOG) corrective regimen sliding scale   SubCutaneous three times a day before meals  insulin lispro (HumaLOG) corrective regimen sliding scale   SubCutaneous at bedtime  insulin lispro Injectable (HumaLOG) 3 Unit(s) SubCutaneous three times a day before meals  metoprolol tartrate 25 milliGRAM(s) Oral two times a day  piperacillin/tazobactam IVPB.. 3.375 Gram(s) IV Intermittent every 12 hours  sevelamer carbonate 1600 milliGRAM(s) Oral three times a day with meals    MEDICATIONS  (PRN):  dextrose 40% Gel 15 Gram(s) Oral once PRN Blood Glucose LESS THAN 70 milliGRAM(s)/deciliter  oxyCODONE    5 mG/acetaminophen 325 mG 1 Tablet(s) Oral every 6 hours PRN Severe Pain (7 - 10)  senna 2 Tablet(s) Oral at bedtime PRN Constipation        LABS/IMAGIN.4    4.8   )-----------( 198      ( 2019 03:59 )             27.2   06-20    135  |  93<L>  |  22  ----------------------------<  504<HH>  4.1   |  27  |  3.49<H>    Ca    9.5      2019 03:59  Phos  3.5     06-  Mg     2.1     06-          < from: Xray Chest 1 View-PORTABLE IMMEDIATE (06.15.19 @ 22:06) >  New patchy left basilar opacity compatible with pneumonia.    < end of copied text >        ASSESSMENT:  64 yo female with multiple medical problems admitted for parainfluenza infection with superimposed LLL infiltrate    PLAN:    abx as per ELVIE edmonds with  after dc    Please feel free to call me for any questions/concerns.    Alem Tate MD  [facility] Pulmonary/Sleep Medicine  [phone]

## 2019-06-20 NOTE — PROGRESS NOTE ADULT - PROBLEM SELECTOR PLAN 1
-test BG AC/HS/2AM  -If BG>350 at lunchtime, would keep pt NPO for time being.   -c/w Lantus 6 units QHS  -c/w Humalog 3 units AC meals (hold if not eating)  -c/w Humalog low correction scale AC and adjusted HS/3AM scales  -if pt remains inpatient, will add Humalog to cover HS snack if eating  -Discharge: basal/bolus w/outpt follow up w/Pina Ley  discussed w/pt and medicine NP  pager: [phone]

## 2019-06-20 NOTE — PROGRESS NOTE ADULT - SUBJECTIVE AND OBJECTIVE BOX
Patient is a 63y old  Female who presents with a chief complaint of chest pain, SOB (20 Jun 2019 10:42)      SUBJECTIVE / OVERNIGHT EVENTS: feels better. Had high sugar during night.  Review of Systems  chest pain no  palpitations no  sob no  nausea no  headache no    MEDICATIONS  (STANDING):  ALBUTerol/ipratropium for Nebulization 3 milliLiter(s) Nebulizer every 6 hours  aspirin enteric coated 81 milliGRAM(s) Oral daily  atorvastatin 20 milliGRAM(s) Oral at bedtime  dextrose 5%. 1000 milliLiter(s) (50 mL/Hr) IV Continuous <Continuous>  dextrose 50% Injectable 12.5 Gram(s) IV Push once  dextrose 50% Injectable 25 Gram(s) IV Push once  dextrose 50% Injectable 25 Gram(s) IV Push once  docusate sodium 100 milliGRAM(s) Oral three times a day  heparin  Injectable 5000 Unit(s) SubCutaneous every 8 hours  insulin glargine Injectable (LANTUS) 6 Unit(s) SubCutaneous at bedtime  insulin lispro (HumaLOG) corrective regimen sliding scale   SubCutaneous <User Schedule>  insulin lispro (HumaLOG) corrective regimen sliding scale   SubCutaneous three times a day before meals  insulin lispro (HumaLOG) corrective regimen sliding scale   SubCutaneous at bedtime  insulin lispro Injectable (HumaLOG) 3 Unit(s) SubCutaneous three times a day before meals  metoprolol tartrate 25 milliGRAM(s) Oral two times a day  piperacillin/tazobactam IVPB.. 3.375 Gram(s) IV Intermittent every 12 hours  sevelamer carbonate 1600 milliGRAM(s) Oral three times a day with meals    MEDICATIONS  (PRN):  dextrose 40% Gel 15 Gram(s) Oral once PRN Blood Glucose LESS THAN 70 milliGRAM(s)/deciliter  oxyCODONE    5 mG/acetaminophen 325 mG 1 Tablet(s) Oral every 6 hours PRN Severe Pain (7 - 10)  senna 2 Tablet(s) Oral at bedtime PRN Constipation      Vital Signs Last 24 Hrs  T(C): 36.6 (20 Jun 2019 11:14), Max: 36.9 (19 Jun 2019 21:11)  T(F): 97.9 (20 Jun 2019 11:14), Max: 98.5 (20 Jun 2019 04:49)  HR: 75 (20 Jun 2019 11:14) (75 - 103)  BP: 135/67 (20 Jun 2019 11:14) (130/68 - 149/72)  BP(mean): --  RR: 16 (20 Jun 2019 11:14) (16 - 18)  SpO2: 97% (20 Jun 2019 11:14) (95% - 97%)    PHYSICAL EXAM:  GENERAL: NAD  HEAD:  Atraumatic, Normocephalic  EYES: EOMI, PERRLA, conjunctiva and sclera clear  NECK: Supple, No JVD  CHEST/LUNG: few crackles to auscultation bilaterally; No wheeze  HEART: Regular rate and rhythm; No murmurs, rubs, or gallops  ABDOMEN: Soft, Nontender, Nondistended; Bowel sounds present  EXTREMITIES:  2+ Peripheral Pulses, No clubbing, cyanosis, or edema  PSYCH: AAOx3  NEUROLOGY: non-focal  SKIN: No rashes or lesions    LABS:                        9.4    4.8   )-----------( 198      ( 20 Jun 2019 03:59 )             27.2     06-20    135  |  93<L>  |  22  ----------------------------<  504<HH>  4.1   |  27  |  3.49<H>    Ca    9.5      20 Jun 2019 03:59  Phos  3.5     06-20  Mg     2.1     06-20                  RADIOLOGY & ADDITIONAL TESTS:    Imaging Personally Reviewed:    Consultant(s) Notes Reviewed:      Care Discussed with Consultants/Other Providers:

## 2019-06-21 ENCOUNTER — TRANSCRIPTION ENCOUNTER (OUTPATIENT)
Age: 64
End: 2019-06-21

## 2019-06-21 VITALS
OXYGEN SATURATION: 97 % | TEMPERATURE: 98 F | RESPIRATION RATE: 17 BRPM | DIASTOLIC BLOOD PRESSURE: 69 MMHG | SYSTOLIC BLOOD PRESSURE: 133 MMHG | HEART RATE: 71 BPM

## 2019-06-21 LAB
ANION GAP SERPL CALC-SCNC: 13 MMOL/L — SIGNIFICANT CHANGE UP (ref 5–17)
BUN SERPL-MCNC: 17 MG/DL — SIGNIFICANT CHANGE UP (ref 7–23)
CALCIUM SERPL-MCNC: 9.7 MG/DL — SIGNIFICANT CHANGE UP (ref 8.4–10.5)
CHLORIDE SERPL-SCNC: 94 MMOL/L — LOW (ref 96–108)
CO2 SERPL-SCNC: 27 MMOL/L — SIGNIFICANT CHANGE UP (ref 22–31)
CREAT SERPL-MCNC: 2.95 MG/DL — HIGH (ref 0.5–1.3)
CULTURE RESULTS: SIGNIFICANT CHANGE UP
CULTURE RESULTS: SIGNIFICANT CHANGE UP
GLUCOSE BLDC GLUCOMTR-MCNC: 242 MG/DL — HIGH (ref 70–99)
GLUCOSE BLDC GLUCOMTR-MCNC: 349 MG/DL — HIGH (ref 70–99)
GLUCOSE BLDC GLUCOMTR-MCNC: 390 MG/DL — HIGH (ref 70–99)
GLUCOSE SERPL-MCNC: 401 MG/DL — HIGH (ref 70–99)
POTASSIUM SERPL-MCNC: 3.7 MMOL/L — SIGNIFICANT CHANGE UP (ref 3.5–5.3)
POTASSIUM SERPL-SCNC: 3.7 MMOL/L — SIGNIFICANT CHANGE UP (ref 3.5–5.3)
SODIUM SERPL-SCNC: 134 MMOL/L — LOW (ref 135–145)
SPECIMEN SOURCE: SIGNIFICANT CHANGE UP
SPECIMEN SOURCE: SIGNIFICANT CHANGE UP

## 2019-06-21 PROCEDURE — 87633 RESP VIRUS 12-25 TARGETS: CPT

## 2019-06-21 PROCEDURE — 80048 BASIC METABOLIC PNL TOTAL CA: CPT

## 2019-06-21 PROCEDURE — 82728 ASSAY OF FERRITIN: CPT

## 2019-06-21 PROCEDURE — 82947 ASSAY GLUCOSE BLOOD QUANT: CPT

## 2019-06-21 PROCEDURE — 87581 M.PNEUMON DNA AMP PROBE: CPT

## 2019-06-21 PROCEDURE — 85730 THROMBOPLASTIN TIME PARTIAL: CPT

## 2019-06-21 PROCEDURE — 96375 TX/PRO/DX INJ NEW DRUG ADDON: CPT

## 2019-06-21 PROCEDURE — 85027 COMPLETE CBC AUTOMATED: CPT

## 2019-06-21 PROCEDURE — 83930 ASSAY OF BLOOD OSMOLALITY: CPT

## 2019-06-21 PROCEDURE — 87798 DETECT AGENT NOS DNA AMP: CPT

## 2019-06-21 PROCEDURE — 99232 SBSQ HOSP IP/OBS MODERATE 35: CPT

## 2019-06-21 PROCEDURE — 85014 HEMATOCRIT: CPT

## 2019-06-21 PROCEDURE — 82803 BLOOD GASES ANY COMBINATION: CPT

## 2019-06-21 PROCEDURE — 87449 NOS EACH ORGANISM AG IA: CPT

## 2019-06-21 PROCEDURE — 83036 HEMOGLOBIN GLYCOSYLATED A1C: CPT

## 2019-06-21 PROCEDURE — 93308 TTE F-UP OR LMTD: CPT

## 2019-06-21 PROCEDURE — 82330 ASSAY OF CALCIUM: CPT

## 2019-06-21 PROCEDURE — 93005 ELECTROCARDIOGRAM TRACING: CPT

## 2019-06-21 PROCEDURE — 87040 BLOOD CULTURE FOR BACTERIA: CPT

## 2019-06-21 PROCEDURE — 82962 GLUCOSE BLOOD TEST: CPT

## 2019-06-21 PROCEDURE — 99285 EMERGENCY DEPT VISIT HI MDM: CPT | Mod: 25

## 2019-06-21 PROCEDURE — 83970 ASSAY OF PARATHORMONE: CPT

## 2019-06-21 PROCEDURE — 96365 THER/PROPH/DIAG IV INF INIT: CPT

## 2019-06-21 PROCEDURE — 82310 ASSAY OF CALCIUM: CPT

## 2019-06-21 PROCEDURE — 84132 ASSAY OF SERUM POTASSIUM: CPT

## 2019-06-21 PROCEDURE — 83550 IRON BINDING TEST: CPT

## 2019-06-21 PROCEDURE — 84100 ASSAY OF PHOSPHORUS: CPT

## 2019-06-21 PROCEDURE — 99261: CPT

## 2019-06-21 PROCEDURE — 83735 ASSAY OF MAGNESIUM: CPT

## 2019-06-21 PROCEDURE — 83605 ASSAY OF LACTIC ACID: CPT

## 2019-06-21 PROCEDURE — 80053 COMPREHEN METABOLIC PANEL: CPT

## 2019-06-21 PROCEDURE — 94640 AIRWAY INHALATION TREATMENT: CPT

## 2019-06-21 PROCEDURE — 83540 ASSAY OF IRON: CPT

## 2019-06-21 PROCEDURE — 74176 CT ABD & PELVIS W/O CONTRAST: CPT

## 2019-06-21 PROCEDURE — 82435 ASSAY OF BLOOD CHLORIDE: CPT

## 2019-06-21 PROCEDURE — 82010 KETONE BODYS QUAN: CPT

## 2019-06-21 PROCEDURE — 85610 PROTHROMBIN TIME: CPT

## 2019-06-21 PROCEDURE — 84295 ASSAY OF SERUM SODIUM: CPT

## 2019-06-21 PROCEDURE — 71045 X-RAY EXAM CHEST 1 VIEW: CPT

## 2019-06-21 PROCEDURE — 87486 CHLMYD PNEUM DNA AMP PROBE: CPT

## 2019-06-21 RX ORDER — ENOXAPARIN SODIUM 100 MG/ML
3 INJECTION SUBCUTANEOUS
Qty: 0 | Refills: 0 | DISCHARGE

## 2019-06-21 RX ORDER — DOCUSATE SODIUM 100 MG
1 CAPSULE ORAL
Qty: 30 | Refills: 0
Start: 2019-06-21 | End: 2019-06-30

## 2019-06-21 RX ORDER — SENNA PLUS 8.6 MG/1
2 TABLET ORAL
Qty: 30 | Refills: 0
Start: 2019-06-21 | End: 2019-07-05

## 2019-06-21 RX ORDER — FUROSEMIDE 40 MG
1 TABLET ORAL
Qty: 0 | Refills: 0 | DISCHARGE

## 2019-06-21 RX ADMIN — Medication 3 MILLILITER(S): at 05:12

## 2019-06-21 RX ADMIN — Medication 3 MILLILITER(S): at 12:13

## 2019-06-21 RX ADMIN — OXYCODONE AND ACETAMINOPHEN 1 TABLET(S): 5; 325 TABLET ORAL at 02:33

## 2019-06-21 RX ADMIN — Medication 2: at 02:34

## 2019-06-21 RX ADMIN — OXYCODONE AND ACETAMINOPHEN 1 TABLET(S): 5; 325 TABLET ORAL at 03:32

## 2019-06-21 RX ADMIN — Medication 3 UNIT(S): at 08:28

## 2019-06-21 RX ADMIN — HEPARIN SODIUM 5000 UNIT(S): 5000 INJECTION INTRAVENOUS; SUBCUTANEOUS at 05:12

## 2019-06-21 RX ADMIN — Medication 25 MILLIGRAM(S): at 05:12

## 2019-06-21 RX ADMIN — Medication 3 UNIT(S): at 12:11

## 2019-06-21 RX ADMIN — Medication 2: at 12:11

## 2019-06-21 RX ADMIN — SEVELAMER CARBONATE 1600 MILLIGRAM(S): 2400 POWDER, FOR SUSPENSION ORAL at 12:13

## 2019-06-21 RX ADMIN — Medication 81 MILLIGRAM(S): at 12:14

## 2019-06-21 RX ADMIN — Medication 100 MILLIGRAM(S): at 05:12

## 2019-06-21 RX ADMIN — Medication 5: at 08:29

## 2019-06-21 RX ADMIN — HEPARIN SODIUM 5000 UNIT(S): 5000 INJECTION INTRAVENOUS; SUBCUTANEOUS at 13:00

## 2019-06-21 RX ADMIN — SEVELAMER CARBONATE 1600 MILLIGRAM(S): 2400 POWDER, FOR SUSPENSION ORAL at 08:31

## 2019-06-21 NOTE — PROGRESS NOTE ADULT - SUBJECTIVE AND OBJECTIVE BOX
Patient is a 63y old  Female who presents with a chief complaint of chest pain, SOB (21 Jun 2019 14:00)      SUBJECTIVE / OVERNIGHT EVENTS: Comfortable without new complaints.   Review of Systems  chest pain no  palpitations no  sob no  nausea no  headache no    MEDICATIONS  (STANDING):  ALBUTerol/ipratropium for Nebulization 3 milliLiter(s) Nebulizer every 6 hours  aspirin enteric coated 81 milliGRAM(s) Oral daily  atorvastatin 20 milliGRAM(s) Oral at bedtime  dextrose 5%. 1000 milliLiter(s) (50 mL/Hr) IV Continuous <Continuous>  dextrose 50% Injectable 12.5 Gram(s) IV Push once  dextrose 50% Injectable 25 Gram(s) IV Push once  dextrose 50% Injectable 25 Gram(s) IV Push once  docusate sodium 100 milliGRAM(s) Oral three times a day  heparin  Injectable 5000 Unit(s) SubCutaneous every 8 hours  insulin glargine Injectable (LANTUS) 6 Unit(s) SubCutaneous at bedtime  insulin lispro (HumaLOG) corrective regimen sliding scale   SubCutaneous <User Schedule>  insulin lispro (HumaLOG) corrective regimen sliding scale   SubCutaneous three times a day before meals  insulin lispro (HumaLOG) corrective regimen sliding scale   SubCutaneous at bedtime  insulin lispro Injectable (HumaLOG) 3 Unit(s) SubCutaneous three times a day before meals  insulin lispro Injectable (HumaLOG) 2 Unit(s) SubCutaneous at bedtime  metoprolol tartrate 25 milliGRAM(s) Oral two times a day  sevelamer carbonate 1600 milliGRAM(s) Oral three times a day with meals    MEDICATIONS  (PRN):  dextrose 40% Gel 15 Gram(s) Oral once PRN Blood Glucose LESS THAN 70 milliGRAM(s)/deciliter  oxyCODONE    5 mG/acetaminophen 325 mG 1 Tablet(s) Oral every 6 hours PRN Severe Pain (7 - 10)  senna 2 Tablet(s) Oral at bedtime PRN Constipation      Vital Signs Last 24 Hrs  T(C): 36.8 (21 Jun 2019 11:02), Max: 36.8 (21 Jun 2019 11:02)  T(F): 98.3 (21 Jun 2019 11:02), Max: 98.3 (21 Jun 2019 11:02)  HR: 71 (21 Jun 2019 11:02) (71 - 85)  BP: 133/69 (21 Jun 2019 11:02) (113/67 - 138/67)  BP(mean): --  RR: 17 (21 Jun 2019 11:02) (17 - 18)  SpO2: 97% (21 Jun 2019 11:02) (95% - 99%)    PHYSICAL EXAM:  GENERAL: NAD  HEAD:  Atraumatic, Normocephalic  EYES: EOMI, PERRLA, conjunctiva and sclera clear  NECK: Supple, No JVD  CHEST/LUNG: Clear to auscultation bilaterally; No wheeze  HEART: Regular rate and rhythm; No murmurs, rubs, or gallops  ABDOMEN: Soft, Nontender, Nondistended; Bowel sounds present  EXTREMITIES:  2+ Peripheral Pulses, No clubbing, cyanosis, or edema  PSYCH: AAOx3  NEUROLOGY: non-focal  SKIN: No rashes or lesions    LABS:                        9.4    4.8   )-----------( 198      ( 20 Jun 2019 03:59 )             27.2     06-21    134<L>  |  94<L>  |  17  ----------------------------<  401<H>  3.7   |  27  |  2.95<H>    Ca    9.7      21 Jun 2019 05:37  Phos  3.5     06-20  Mg     2.1     06-20                  RADIOLOGY & ADDITIONAL TESTS:    Imaging Personally Reviewed:    Consultant(s) Notes Reviewed:      Care Discussed with Consultants/Other Providers:

## 2019-06-21 NOTE — PROGRESS NOTE ADULT - PROVIDER SPECIALTY LIST ADULT
Cardiology
Endocrinology
Infectious Disease
Internal Medicine
Nephrology
Pulmonology
Endocrinology
Pulmonology
Nephrology
Internal Medicine
Internal Medicine
Endocrinology

## 2019-06-21 NOTE — PROGRESS NOTE ADULT - SUBJECTIVE AND OBJECTIVE BOX
Diabetes Follow up note: Saw pt earlier today prior discharge  Interval Hx: 63 year old woman with PMH uncontrolled DM1 with multiple complications and comorbidities including ESRD on HD, well known to endo team here w/multi-focal pneumonia. BG values remain elevated at night because pt still eating at different times ot night.  today.  Reports she is going home today and feels better. Cough improved and just feels tired. Eager to leave.       Review of Systems:  General: denies pain   GI: Tolerating POs without any N/V/D/ABD PAIN.  CV: No CP/SOB  ENDO: No S&Sx of hypoglycemia.     MEDS:  atorvastatin 20 milliGRAM(s) Oral at bedtime  insulin glargine Injectable (LANTUS) 6 Unit(s) SubCutaneous at bedtime  insulin lispro (HumaLOG) corrective regimen sliding scale   SubCutaneous <User Schedule>  insulin lispro (HumaLOG) corrective regimen sliding scale   SubCutaneous three times a day before meals  insulin lispro (HumaLOG) corrective regimen sliding scale   SubCutaneous at bedtime  insulin lispro Injectable (HumaLOG) 3 Unit(s) SubCutaneous three times a day before meals  insulin lispro Injectable (HumaLOG) 2 Unit(s) SubCutaneous at bedtime  piperacillin/tazobactam IVPB.. 3.375 Gram(s) IV Intermittent every 12 hours    Allergies    No Known Allergies    PE:  General: Female sitting in chair in  NAD.   Vital Signs Last 24 Hrs  T(C): 36.8 (06-21-19 @ 11:02), Max: 36.8 (06-21-19 @ 11:02)  T(F): 98.3 (06-21-19 @ 11:02), Max: 98.3 (06-21-19 @ 11:02)  HR: 71 (06-21-19 @ 11:02) (71 - 85)  BP: 133/69 (06-21-19 @ 11:02) (113/67 - 138/67)  BP(mean): --  RR: 17 (06-21-19 @ 11:02) (17 - 18)  SpO2: 97% (06-21-19 @ 11:02) (95% - 99%)  Abd: Soft, NT, ND  Extremities: Warm. trace LE edema  Neuro: Awake and alert. Able to answer questions.     LABS:  POCT Blood Glucose.: 242 mg/dL (06-21-19 @ 11:42)  POCT Blood Glucose.: 390 mg/dL (06-21-19 @ 07:54)  POCT Blood Glucose.: 349 mg/dL (06-21-19 @ 02:31)  POCT Blood Glucose.: 153 mg/dL (06-20-19 @ 21:20)  POCT Blood Glucose.: 128 mg/dL (06-20-19 @ 18:44)  POCT Blood Glucose.: 341 mg/dL (06-20-19 @ 11:42)  POCT Blood Glucose.: 481 mg/dL (06-20-19 @ 09:02)  POCT Blood Glucose.: 478 mg/dL (06-20-19 @ 07:46)  POCT Blood Glucose.: 438 mg/dL (06-20-19 @ 07:45)  POCT Blood Glucose.: 448 mg/dL (06-20-19 @ 05:13)  POCT Blood Glucose.: >600 mg/dL (06-20-19 @ 03:15)  POCT Blood Glucose.: 565 mg/dL (06-20-19 @ 03:14)  POCT Blood Glucose.: 241 mg/dL (06-19-19 @ 21:32)  POCT Blood Glucose.: 245 mg/dL (06-19-19 @ 18:26)                           9.4    4.8   )-----------( 198      ( 20 Jun 2019 03:59 )             27.2     06-21    134<L>  |  94<L>  |  17  ----------------------------<  401<H>  3.7   |  27  |  2.95<H>    Ca    9.7      21 Jun 2019 05:37  Phos  3.5     06-20  Mg     2.1     06-20      06-20    135  |  93<L>  |  22  ----------------------------<  504<HH>  4.1   |  27  |  3.49<H>    Ca    9.5      20 Jun 2019 03:59  Phos  3.5     06-20  Mg     2.1     06-20          Hemoglobin A1C, Whole Blood: 8.2 % <H> [4.0 - 5.6] (06-16-19 @ 13:24)  Hemoglobin A1C, Whole Blood: 8.8 % <H> [4.0 - 5.6] (04-30-19 @ 08:58)            Contact number: isabel [phone] or [phone]

## 2019-06-21 NOTE — PROGRESS NOTE ADULT - ASSESSMENT
63 year old woman with PMH uncontrolled DM1 with multiple complications and comorbidities including ESRD on HD, well known to endo team. Recent admission with hyperglycemia which was resolved and discharged. However, pt readmitted with SOB/cough symptoms and was found to have hyperglycemia, multifocal pneumonia> on antibiotics (given on D5) and fluid overload>. Pt w/severe hyperglycemia overnight/this AM in setting of eating meal at bedtime w/out rapid acting insulin coverage. BMP sent overnight, slight elevation in beta-hydroxybutyrate but not acidotic w/normal bicarb. Pt to be dialyzed today, possible clearance for discharge. Tolerating POs and feeling better overall. BG goal (100-200mg/dl). Pt w/poor short term memory, requires family assistance at home w/managing DM.
63F c hx CAD (Cath Feb '19 showing 99% RCA, 100% RPLS, 100% Cx) s/p multiple stents/brachytherapy, ESRD (on HD M/T/T/Sa), DM1 c/b neuropathy and retinopathy, CHF (EF 30% per last [facility]), mod MR, severe AS, RHF, TIA, severe PVD s/p b/l fempop bypass, left subclavian vein stenosis s/p stent, COPD not on O2, gout, hilar lymphadenopathy of unknown etiology, poor medical mgmt at home, frequent hospitalizations (usually 1-3 times a month) for DKA, hyperglycemia, PNA, recently discharged 3 days ago for hyperkalemia, pw SOB, coughing and fevers with LLL pna.     1- esrd  2- shpt   3- chf hx  4- htn   5- cad    HD revaclear 300 3.5 hr 2 k bfr 400 dfr 600  see hd flow sheet
· Assessment		  63F c hx CAD (Cath Feb '19 showing 99% RCA, 100% RPLS, 100% Cx) s/p multiple stents/brachytherapy, ESRD (on HD M/T/T/Sa), DM1 c/b neuropathy and retinopathy, CHF (EF 30% per last [facility]), mod MR, severe AS, RHF, TIA, severe PVD s/p b/l fempop bypass, left subclavian vein stenosis s/p stent, COPD not on O2, gout, hilar lymphadenopathy of unknown etiology, poor medical mgmt at home, frequent hospitalizations (usually 1-3 times a month) for DKA, hyperglycemia, PNA, recently discharged 3 days ago for hyperkalemia, pw sepsis 2/2 parainfluenza and HCAP.      HCAP (healthcare-associated pneumonia).  - cont vanc (dosed during HD), zosyn renal dosing, azithro  - check vanc level in AM  - f/u blood cultures.   - ID follow  - Pulmonary evaluation noted.       Sepsis, due to unspecified organism.  - f/u blood cultues  - abx as above  - holding lisinopril, lasix, hydralazine.     Type 1 diabetes mellitus with chronic kidney disease on chronic dialysis.   - endocrine evaluation noted  - cont lantus + ISS.     ESRD (end stage renal disease).   - HD  - Nephrology evaluation Dr. Schmidt noted  - cont renagel + renal diet.     Coronary artery disease of native artery of native heart with stable angina pectoris.  - cont asa, plavix, statin.  - cardiology evaluation Dr. Biswas noted    Chronic systolic congestive heart failure.   - cont metoprolol 25mg BID for now  - restart other BP meds as pressures increase.   - cardiology evaluation Dr. True Viera MD pager [phone]
· Assessment		  63F c hx CAD (Cath Feb '19 showing 99% RCA, 100% RPLS, 100% Cx) s/p multiple stents/brachytherapy, ESRD (on HD M/T/T/Sa), DM1 c/b neuropathy and retinopathy, CHF (EF 30% per last [facility]), mod MR, severe AS, RHF, TIA, severe PVD s/p b/l fempop bypass, left subclavian vein stenosis s/p stent, COPD not on O2, gout, hilar lymphadenopathy of unknown etiology, poor medical mgmt at home, frequent hospitalizations (usually 1-3 times a month) for DKA, hyperglycemia, PNA, recently discharged 3 days ago for hyperkalemia, pw sepsis 2/2 parainfluenza and HCAP.      HCAP (healthcare-associated pneumonia).  - cont zosyn renal dosing, azithro  - ID follow  - Pulmonary evaluation noted.       Sepsis, due to unspecified organism.  - f/u blood cultues  - abx as above  - holding lisinopril, lasix, hydralazine.     Type 1 diabetes mellitus with chronic kidney disease on chronic dialysis.   - endocrine follow  - cont lantus + ISS.     ESRD (end stage renal disease).   - HD  - Nephrology follow Dr. Schmidt noted  - cont renagel + renal diet.     Coronary artery disease of native artery of native heart with stable angina pectoris.  - cont asa, plavix, statin.  - cardiology follow    Chronic systolic congestive heart failure.   - cont metoprolol 25mg BID for now  - restart other BP meds as pressures increase.   - cardiology follow  Pierce Viera MD pager [phone]
63 year old woman with PMH uncontrolled DM1 with multiple complications and comorbidities including ESRD on HD, well known to endo team. Recent admission with hyperglycemia which was resolved and discharged. However, pt readmitted with SOB/cough symptoms and was found to have hyperglycemia, multifocal pneumonia> on antibiotic and fluid overload>. Clinical status improved but pt with perssitent hyperglycemia over night because she eats at different times of the night and doesn't get Humalog insulin. Received 2 units of Humalog at hs but ate thereafter again causing rebound hyperglycemia. At home pt doesn't eat at night and requires lower doses of Lantus insulin. Pt w/poor short term memory, requires family assistance at home w/managing DM. Spoke to pt's  about plan of care at home.
63 year old woman with PMH uncontrolled DM1 with multiple complications and comorbidities including ESRD on HD, well known to endo team. Recent admission with hyperglycemia which was resolved and discharged. However, pt readmitted with SOB/cough symptoms and was found to have hyperglycemia, multifocal pneumonia> on antibiotics (given on D5) and fluid overload> Tolerating POs with glycemic control above goal  even though pt is getting higher insulin doses than she usually requires. No hypoglycemia. Noted BG <200s ac lunch today. Need to be very careful with insulin adjustments on this pt since she is very insulin sensitive and can have sudden hypoglycemia as acute infection resolves and BGs improve. Will continue present insulin doses and just add a 3am scale for BG >300s.
63F c hx CAD (Cath Feb '19 showing 99% RCA, 100% RPLS, 100% Cx) s/p multiple stents/brachytherapy, ESRD (on HD M/T/T/Sa), DM1 c/b neuropathy and retinopathy, CHF (EF 30% per last [facility]), mod MR, severe AS, RHF, TIA, severe PVD s/p b/l fempop bypass, left subclavian vein stenosis s/p stent, COPD not on O2, gout, hilar lymphadenopathy of unknown etiology, poor medical mgmt at home, frequent hospitalizations (usually 1-3 times a month) for DKA, hyperglycemia, PNA, recently discharged 3 days ago for hyperkalemia, pw SOB, coughing and fevers with LLL pna.     1- esrd  2- shpt   3- chf hx  4- htn   5- cad  6- DM   7- pna/parainfluenza infection         hd in am   cont lisinopril   renvela 3 tab with meals   zosyn 3.375 g iv bid/zithromax   cont O2
· Assessment		  63F c hx CAD (Cath Feb '19 showing 99% RCA, 100% RPLS, 100% Cx) s/p multiple stents/brachytherapy, ESRD (on HD M/T/T/Sa), DM1 c/b neuropathy and retinopathy, CHF (EF 30% per last [facility]), mod MR, severe AS, RHF, TIA, severe PVD s/p b/l fempop bypass, left subclavian vein stenosis s/p stent, COPD not on O2, gout, hilar lymphadenopathy of unknown etiology, poor medical mgmt at home, frequent hospitalizations (usually 1-3 times a month) for DKA, hyperglycemia, PNA, recently discharged 3 days ago for hyperkalemia, pw sepsis 2/2 parainfluenza and HCAP.      HCAP (healthcare-associated pneumonia).  - cont vanc (dosed during HD), zosyn renal dosing, azithro  - check vanc level in AM  - f/u blood cultures.   - ID follow  - Pulmonary evaluation noted.       Sepsis, due to unspecified organism.  - f/u blood cultues  - abx as above  - holding lisinopril, lasix, hydralazine.     Type 1 diabetes mellitus with chronic kidney disease on chronic dialysis.   - endocrine evaluation noted  - cont lantus + ISS.     ESRD (end stage renal disease).   - HD  - Nephrology evaluation Dr. Schmidt noted  - cont renagel + renal diet.     Coronary artery disease of native artery of native heart with stable angina pectoris.  - cont asa, plavix, statin.  - cardiology evaluation Dr. Biswas noted    Chronic systolic congestive heart failure.   - cont metoprolol 25mg BID for now  - restart other BP meds as pressures increase.   - cardiology evaluation Dr. Biswas noted  Pierce Viera MD pager [phone]
· Assessment		  63F c hx CAD (Cath Feb '19 showing 99% RCA, 100% RPLS, 100% Cx) s/p multiple stents/brachytherapy, ESRD (on HD M/T/T/Sa), DM1 c/b neuropathy and retinopathy, CHF (EF 30% per last [facility]), mod MR, severe AS, RHF, TIA, severe PVD s/p b/l fempop bypass, left subclavian vein stenosis s/p stent, COPD not on O2, gout, hilar lymphadenopathy of unknown etiology, poor medical mgmt at home, frequent hospitalizations (usually 1-3 times a month) for DKA, hyperglycemia, PNA, recently discharged 3 days ago for hyperkalemia, pw sepsis 2/2 parainfluenza and HCAP.      HCAP (healthcare-associated pneumonia).  - finished zosyn , off azithro  - ID follow  - Pulmonary evaluation noted. Cleared for DC      Sepsis, due to unspecified organism. Resiolved  - abx as above    Type 1 diabetes mellitus with chronic kidney disease on chronic dialysis.   - endocrine follow  - cont lantus + ISS.     ESRD (end stage renal disease).   - HD  - Nephrology follow Dr. Schmidt noted  - cont renagel + renal diet.     Coronary artery disease of native artery of native heart with stable angina pectoris.  - cont asa, plavix, statin.  - cardiology follow    Chronic systolic congestive heart failure.   - cont metoprolol 25mg BID for now  - restart other BP meds as pressures increase.   - cardiology follow    DC home. Follow with PMD/ cardiology/ Pulmonary/ Nephrology/ Endocrine in 3-4 days in am if stable.  Pierce Viera MD pager [phone]
· Assessment		  63F c hx CAD (Cath Feb '19 showing 99% RCA, 100% RPLS, 100% Cx) s/p multiple stents/brachytherapy, ESRD (on HD M/T/T/Sa), DM1 c/b neuropathy and retinopathy, CHF (EF 30% per last [facility]), mod MR, severe AS, RHF, TIA, severe PVD s/p b/l fempop bypass, left subclavian vein stenosis s/p stent, COPD not on O2, gout, hilar lymphadenopathy of unknown etiology, poor medical mgmt at home, frequent hospitalizations (usually 1-3 times a month) for DKA, hyperglycemia, PNA, recently discharged 3 days ago for hyperkalemia, pw sepsis 2/2 parainfluenza and HCAP.      HCAP (healthcare-associated pneumonia).  - cont vanc (dosed during HD), zosyn renal dosing, azithro  - check vanc level in AM  - f/u blood cultures.   - ID evaluation Dr. Simpson  - Pulmonary evaluation Dr. Tate/ [facility] Kasperinflpam.  - duonebs, chest PT  - may need steroids inhaled vs systemic.       Sepsis, due to unspecified organism.  - f/u blood cultues  - abx as above  - holding lisinopril, lasix, hydralazine.     Type 1 diabetes mellitus with chronic kidney disease on chronic dialysis.   - endocrine evaluation called  - cont lantus + ISS.     ESRD (end stage renal disease).   - HD  - Nephrology evaluation Dr. Schmidt  - cont renagel + renal diet.     Coronary artery disease of native artery of native heart with stable angina pectoris.  - cont asa, plavix, statin.  - cardiology evaluation Dr. Biswas    Chronic systolic congestive heart failure.   - cont metoprolol 25mg BID for now  - restart other BP meds as pressures increase.   - cardiology evaluation Dr. True Viera MD pager [phone]
· Assessment		  63F c hx CAD (Cath Feb '19 showing 99% RCA, 100% RPLS, 100% Cx) s/p multiple stents/brachytherapy, ESRD (on HD M/T/T/Sa), DM1 c/b neuropathy and retinopathy, CHF (EF 30% per last [facility]), mod MR, severe AS, RHF, TIA, severe PVD s/p b/l fempop bypass, left subclavian vein stenosis s/p stent, COPD not on O2, gout, hilar lymphadenopathy of unknown etiology, poor medical mgmt at home, frequent hospitalizations (usually 1-3 times a month) for DKA, hyperglycemia, PNA, recently discharged 3 days ago for hyperkalemia, pw sepsis 2/2 parainfluenza and HCAP.      HCAP (healthcare-associated pneumonia).  - cont zosyn renal dosing, off azithro  - ID follow  - Pulmonary evaluation noted.       Sepsis, due to unspecified organism.  - f/u blood cultues  - abx as above  - holding lisinopril, lasix, hydralazine.     Type 1 diabetes mellitus with chronic kidney disease on chronic dialysis.   - endocrine follow  - cont lantus + ISS.     ESRD (end stage renal disease).   - HD  - Nephrology follow Dr. Schmidt noted  - cont renagel + renal diet.     Coronary artery disease of native artery of native heart with stable angina pectoris.  - cont asa, plavix, statin.  - cardiology follow    Chronic systolic congestive heart failure.   - cont metoprolol 25mg BID for now  - restart other BP meds as pressures increase.   - cardiology follow    DCP home in am if stable.  Pierce Viera MD pager [phone]
63 year old woman with poorly controlled DM1 complicated with neuropathy, retinopathy, ESRD on HD (next session 06/17), HTN, HLD, CAD, PVD here with dyspnea, cough and diagnosed with acute pneumonia. Patient with improved PO intake and also with possible stress hyperglycemia. Check FS TID AC/HS and 2AM. BG goal is 100-180.    1. Poorly Controlled T1DM  - No AG or DKA this admission. Was recently discharged from [facility] on 06/12 when she was here for hyperglycemia and early signs of DKA  - Recommend increase Lantus to 6 units QHS, given stress hyperglycemia. Check 3AM FS daily   - Continue Humalog 3 units TID AC and low SSI AC and HS, may need this adjusted tomorrow, but for now need to address fasting hyperglycemia  - Patient has hypoglycemia unawareness so if FS<70, please hold next Humalog dose and page endocrine.   - NEVER hold Lantus, but Lantus will need to be reduced if she has hypoglycemia  - consistent carb diet with HS snack. I advised patient to avoid cheating with outside foods inbetween her meals  - d/c on basal bolus insulin with outpatient follow up with Dr. Pina Ley. She admits to having an appointment with Dr. Ley in 2 weeks    2. Hypertension  - BP goal is <130/80, currently at goal  - Continue home dose Metoprolol 25 mg BID  - Defer adjustment to nephrologist    3. HLD  - Patient is ESRD on HD so recommend continue low dose statin to avoid myalgias  - Continue Atorvastatin 20 mg QHS    Outpatient f/u Dr. Pina Nixon DO  Pager: [phone]
63 year old woman with poorly controlled DM1 complicated with neuropathy, retinopathy, ESRD on HD (next session 06/17), HTN, HLD, CAD, PVD here with dyspnea, cough and diagnosed with acute pneumonia. Patient with improved PO intake and also with possible stress hyperglycemia. Check FS TID AC/HS and 2AM. BG goal is 100-180.    1. Poorly Controlled T1DM  - No AG or DKA this admission. Was recently discharged from [facility] on 06/12 when she was here for hyperglycemia and early signs of DKA  - Recommend increase Lantus to 5 units QHS, given stress hyperglycemia. Check 3AM FS daily   - Continue Humalog 3 units TID AC and change custom SSI to usual low SSI AC and HS  - Patient has hypoglycemia unawareness so if FS<70, please hold next Humalog dose and page endocrine. NEVER hold Lantus, but Lantus will need to be reduced if she has hypo  - consistent carb diet with HS snack. I advised patient to avoid cheating with outside foods inbetween her meals  - d/c on basal bolus insulin with outpatient follow up with Dr. Pina Ley. She admits to having an appointment with Dr. Ley in 2 weeks    2. Hypertension  - BP goal is <130/80, currently at goal  - Continue home dose Metoprolol 25 mg BID  - Defer adjustment to nephrologist    3. HLD  - Patient is ESRD on HD so recommend continue low dose statin to avoid myalgias  - Continue Atorvastatin 20 mg QHS    Outpatient f/u Dr. Pina Nixon DO  Pager: [phone]

## 2019-06-21 NOTE — DISCHARGE NOTE NURSING/CASE MANAGEMENT/SOCIAL WORK - NSSCNAMETXT_GEN_ALL_CORE
Over the last 2 weeks, how often have you been bothered by the following problems?         PHQ2 Score:  0  1. Little interest or pleasure in doing things?:  Not at all  2. Feeling down, depressed, or hopeless?:  Not at all       6 a.) How many servings of Fruits and Vegetables do you have each day ( 1 serving = 1 piece of fruit, 1/2 cup fruits or vegetables):  1 per day     6 b.) How many servings of High Fiber / Whole Grain Foods to you have each day ( 1 serving = 1 cup cold cereal, 1/2 cup cooked cereal, 1 slice bread):  1 per day     11h.) Problems with your hearing:  Often          [facility]

## 2019-06-21 NOTE — PROGRESS NOTE ADULT - SUBJECTIVE AND OBJECTIVE BOX
Cardiovascular Disease Progress Note    Overnight events: No acute events overnight.  feels well. ambulating. no new cardiac sx. bg remains elevated  Otherwise review of systems negative    Objective Findings:  T(C): 36.6 (19 @ 00:35), Max: 36.6 (19 @ 11:14)  HR: 85 (19 @ 04:12) (72 - 85)  BP: 131/65 (19 @ 04:12) (113/67 - 147/74)  RR: 17 (19 @ 04:12) (16 - 18)  SpO2: 98% (19 @ 04:12) (95% - 100%)  Wt(kg): --  Daily     Daily Weight in k.1 (2019 07:13)      Physical Exam:  Gen: NAD  HEENT: EOMI  CV: RRR, normal S1 + S2, no m/r/g  Lungs: CTAB  Abd: soft, non-tender  Ext: No edema    Telemetry: nsr    Laboratory Data:                        9.4    4.8   )-----------( 198      ( 2019 03:59 )             27.2     06-21    134<L>  |  94<L>  |  17  ----------------------------<  401<H>  3.7   |  27  |  2.95<H>    Ca    9.7      2019 05:37  Phos  3.5     06-20  Mg     2.1     06-20                Inpatient Medications:  MEDICATIONS  (STANDING):  ALBUTerol/ipratropium for Nebulization 3 milliLiter(s) Nebulizer every 6 hours  aspirin enteric coated 81 milliGRAM(s) Oral daily  atorvastatin 20 milliGRAM(s) Oral at bedtime  dextrose 5%. 1000 milliLiter(s) (50 mL/Hr) IV Continuous <Continuous>  dextrose 50% Injectable 12.5 Gram(s) IV Push once  dextrose 50% Injectable 25 Gram(s) IV Push once  dextrose 50% Injectable 25 Gram(s) IV Push once  docusate sodium 100 milliGRAM(s) Oral three times a day  heparin  Injectable 5000 Unit(s) SubCutaneous every 8 hours  insulin glargine Injectable (LANTUS) 6 Unit(s) SubCutaneous at bedtime  insulin lispro (HumaLOG) corrective regimen sliding scale   SubCutaneous <User Schedule>  insulin lispro (HumaLOG) corrective regimen sliding scale   SubCutaneous three times a day before meals  insulin lispro (HumaLOG) corrective regimen sliding scale   SubCutaneous at bedtime  insulin lispro Injectable (HumaLOG) 3 Unit(s) SubCutaneous three times a day before meals  insulin lispro Injectable (HumaLOG) 2 Unit(s) SubCutaneous at bedtime  metoprolol tartrate 25 milliGRAM(s) Oral two times a day  sevelamer carbonate 1600 milliGRAM(s) Oral three times a day with meals      Assessment:  -SOB  -multifocal PNA  -NSVT  -pAT  -volume overload  -ischemic cardiomyopathy, cad s/p multiple stents  -esrd on hd  -PAD s/p prior intervention   -malfunctioning AV fistula      Recs:  -SOB: likely multifactorial including multifocal PNA, voume overload 2/2 ESRD. cardiomyopathy, valvular pathology. c/w volume removal with HD. c/w broad spectrum abx. f/u ID  -hypo/hyperglycemia --> f/u endo. improved  -ischemic cardiomyopathy s/p LHC with Dr Vela  --> severe and diffuse instent restenosis along RCA --> unable to stent due to multiple layers of stenting and prior brachytherapy. we can consider complex intervention if true ischemic sx develop. c/w medical treatment with anti-platelet, statins and anti-anginals for now.  would change to metop succinate 50mg daily for anti-anginal effect and for pAT/NSVT/SVT. magnesium wnl. patient declines ICD for primary prevention at this time, and agree that this would pose an increased infection risk and unlikely to be of significant benefit given overall poor prognosis and ongoing chronic medical issues. can address further as outpatient  -c/w beta blockers for nsvt/pat/cad (as above)  -hx of prolonged qtc. avoid qtc prolonging meds  -s/p recent TTE: mod-severe MR, pseudo AS (low flow-low gradient), mod-severe LV dysfunction --> recent CTS consult with dr hebert appreciated. plan is for medical management given surgical risk and patient preference  -c/w asa, plavix, statin for ischemic cardiomyopathy/CAD/PAD  -dvt ppx          Over 25 minutes spent on total encounter; more than 50% of the visit was spent counseling and/or coordinating care by the attending physician.      Julián Biswas MD   Cardiovascular Disease  [phone]

## 2019-06-21 NOTE — PROGRESS NOTE ADULT - SUBJECTIVE AND OBJECTIVE BOX
PULMONARY FOLLOW UP NOTE      NATHAN MEEKS  MRN-[id number]    states she feels fine, wants to go home    ROS: neg    SOCIAL HISTORY  Smoking History:  40 pack year    EXAM:  Vital Signs Last 24 Hrs  T(C): 36.8 (2019 11:02), Max: 36.8 (2019 11:02)  T(F): 98.3 (2019 11:02), Max: 98.3 (2019 11:02)  HR: 71 (2019 11:02) (71 - 85)  BP: 133/69 (2019 11:02) (113/67 - 147/74)  BP(mean): --  RR: 17 (2019 11:02) (16 - 18)  SpO2: 97% (2019 11:02) (95% - 100%)    PHYSICAL EXAMINATION:    GENERAL: The patient is a female in no apparent distress.   LUNGS: clear    MEDICATIONS  (STANDING):  ALBUTerol/ipratropium for Nebulization 3 milliLiter(s) Nebulizer every 6 hours  aspirin enteric coated 81 milliGRAM(s) Oral daily  atorvastatin 20 milliGRAM(s) Oral at bedtime  dextrose 5%. 1000 milliLiter(s) (50 mL/Hr) IV Continuous <Continuous>  dextrose 50% Injectable 12.5 Gram(s) IV Push once  dextrose 50% Injectable 25 Gram(s) IV Push once  dextrose 50% Injectable 25 Gram(s) IV Push once  docusate sodium 100 milliGRAM(s) Oral three times a day  heparin  Injectable 5000 Unit(s) SubCutaneous every 8 hours  insulin glargine Injectable (LANTUS) 6 Unit(s) SubCutaneous at bedtime  insulin lispro (HumaLOG) corrective regimen sliding scale   SubCutaneous <User Schedule>  insulin lispro (HumaLOG) corrective regimen sliding scale   SubCutaneous three times a day before meals  insulin lispro (HumaLOG) corrective regimen sliding scale   SubCutaneous at bedtime  insulin lispro Injectable (HumaLOG) 3 Unit(s) SubCutaneous three times a day before meals  insulin lispro Injectable (HumaLOG) 2 Unit(s) SubCutaneous at bedtime  metoprolol tartrate 25 milliGRAM(s) Oral two times a day  sevelamer carbonate 1600 milliGRAM(s) Oral three times a day with meals    MEDICATIONS  (PRN):  dextrose 40% Gel 15 Gram(s) Oral once PRN Blood Glucose LESS THAN 70 milliGRAM(s)/deciliter  oxyCODONE    5 mG/acetaminophen 325 mG 1 Tablet(s) Oral every 6 hours PRN Severe Pain (7 - 10)  senna 2 Tablet(s) Oral at bedtime PRN Constipation              LABS/IMAGIN.4    4.8   )-----------( 198      ( 2019 03:59 )             27.2   06-21    134<L>  |  94<L>  |  17  ----------------------------<  401<H>  3.7   |  27  |  2.95<H>    Ca    9.7      2019 05:37  Phos  3.5     06-20  Mg     2.1     06-20              < from: Xray Chest 1 View-PORTABLE IMMEDIATE (06.15.19 @ 22:06) >  New patchy left basilar opacity compatible with pneumonia.    < end of copied text >        ASSESSMENT:  64 yo female with multiple medical problems admitted for parainfluenza infection with superimposed LLL infiltrate    PLAN:    abx as per ID   cont brian edmonds with  after dc, no pulm objection to dc home    Please feel free to call me for any questions/concerns.    Alem Tate MD  [facility] Pulmonary/Sleep Medicine  [phone]

## 2019-06-21 NOTE — PROGRESS NOTE ADULT - PROBLEM SELECTOR PLAN 1
DISCHARGE:  -test BG AC/HS  -c/w Lantus 6 units at hs for now. Once FBG <200s can reduce dose back to 4 units which is pt's home dose.  -c/w Humalog 3 units AC meals (hold if not eating)  -Discontinue Humalog correction scale upon discharge  -Pt to follow up w/Pina Ley  -Plan discussed with pt//team NP.  Contact info: [phone] (24/7). pager [phone]

## 2019-06-21 NOTE — DISCHARGE NOTE PROVIDER - HOSPITAL COURSE
63F c hx CAD (Cath Feb '19 showing 99% RCA, 100% RPLS, 100% Cx) s/p multiple stents/brachytherapy, ESRD (on HD M/T/T/Sa), DM1 c/b neuropathy and retinopathy, CHF (EF 30% per last [facility]), mod MR, severe AS, RHF, TIA, severe PVD s/p b/l fempop bypass, left subclavian vein stenosis s/p stent, COPD not on O2, gout, hilar lymphadenopathy of unknown etiology, poor medical mgmt at home, frequent hospitalizations (usually 1-3 times a month) for DKA, hyperglycemia, PNA, recently discharged 3 days ago for hyperkalemia, pw SOB, coughing, occasional fevers and chills. CXR with new patchy L basilar opasity. Pt now started on Zosyn and Zithromax for possible multifocal pna seen on CT.  Patient also positive for parainfluenza infection with superimposed LLL infiltrate. Pulmonary consulted     she is not adherent to inhalers, PFTs showing moderate obstructive disease(outpatient)    will continue to discuss importance of therapy for herCOPD    completed 5 day course    Hospital stay complicated by hyperglycemia - Endocrine consulted and insulin doses adjusted    She feels better 63F c hx CAD (Cath Feb '19 showing 99% RCA, 100% RPLS, 100% Cx) s/p multiple stents/brachytherapy, ESRD (on HD M/T/T/Sa), DM1 c/b neuropathy and retinopathy, CHF (EF 30% per last [facility]), mod MR, severe AS, RHF, TIA, severe PVD s/p b/l fempop bypass, left subclavian vein stenosis s/p stent, COPD not on O2, gout, hilar lymphadenopathy of unknown etiology, poor medical mgmt at home, frequent hospitalizations (usually 1-3 times a month) for DKA, hyperglycemia, PNA, recently discharged 3 days ago for hyperkalemia, pw SOB, coughing, occasional fevers and chills. CXR with new patchy L basilar opasity. Pt now started on Zosyn and Zithromax for possible multifocal pna seen on CT.  Patient also positive for parainfluenza infection with superimposed LLL infiltrate. Pulmonary consulted. She is not adherent to inhalers, PFTs showing moderate obstructive disease(outpatient)    Completed 5 day course of antibiotics fro Pneumonia    Hospital stay complicated by hyperglycemia - Endocrine consulted and insulin doses adjusted    She feels better 63F c hx CAD (Cath Feb '19 showing 99% RCA, 100% RPLS, 100% Cx) s/p multiple stents/brachytherapy, ESRD (on HD M/T/T/Sa), DM1 c/b neuropathy and retinopathy, CHF (EF 30% per last [facility]), mod MR, severe AS, RHF, TIA, severe PVD s/p b/l fempop bypass, left subclavian vein stenosis s/p stent, COPD not on O2, gout, hilar lymphadenopathy of unknown etiology, poor medical mgmt at home, frequent hospitalizations (usually 1-3 times a month) for DKA, hyperglycemia, PNA, recently discharged 3 days ago for hyperkalemia, pw SOB, coughing, occasional fevers and chills. CXR with new patchy L basilar opasity. Pt now started on Zosyn and Zithromax for possible multifocal pna seen on CT.  Patient also positive for parainfluenza infection with superimposed LLL infiltrate. Pulmonary consulted. She is not adherent to inhalers, PFTs showing moderate obstructive disease(outpatient)    Completed 5 day course of antibiotics fro Pneumonia    Hospital stay complicated by hyperglycemia - Endocrine consulted and insulin doses adjusted    She feels better, ambulating in hallway, no SOB. Medically cleared for discharge with follow up with PMD - Dr. Thompson in 1 week

## 2019-06-21 NOTE — DISCHARGE NOTE PROVIDER - PROVIDER TOKENS
PROVIDER:[TOKEN:[[id number]],FOLLOWUP:[1 week]],PROVIDER:[TOKEN:[[id number]],FOLLOWUP:[1-3 days]] PROVIDER:[TOKEN:[[id number]],FOLLOWUP:[1 week]],PROVIDER:[TOKEN:[[id number]],FOLLOWUP:[1-3 days]],PROVIDER:[TOKEN:[[id number]],FOLLOWUP:[1-3 days]],PROVIDER:[TOKEN:[[id number]],FOLLOWUP:[1 week]]

## 2019-06-21 NOTE — DISCHARGE NOTE PROVIDER - NSDCCPCAREPLAN_GEN_ALL_CORE_FT
PRINCIPAL DISCHARGE DIAGNOSIS  Diagnosis: Multifocal pneumonia  Assessment and Plan of Treatment: Pneumonia is a lung infection that can cause a fever, cough, and trouble breathing.  Completed antibiotics  Call your health care provider upon arrival home from hospital and make a follow up appointment for one week.  If your cough worsens, you develop fever greater than 101', you have shaking chills, a fast heartbeat, trouble breathing and/or feel your are breathing much faster than usual, call your healthcare provider.  Make sure you wash your hands frequently.        SECONDARY DISCHARGE DIAGNOSES  Diagnosis: Parainfluenza  Assessment and Plan of Treatment: Now improved  If your cough worsens, you develop fever greater than 101', you have shaking chills, a fast heartbeat, trouble breathing and/or feel your are breathing much faster than usual, call your healthcare provider.    Diagnosis: Sepsis, due to unspecified organism  Assessment and Plan of Treatment: Due to Vial infection and pneumonia  Call you Health care provider upon arrival home to make a one week follow up appointment.  If you develop fever, chills, malaise, or change in mental status call your Health Care Provider or go to the Emergency Department.  Nutrition is important, eat small frequent meals to help ensure you get adequate calories.  Do not stay in bed all day!  Increase your activity daily as tolerated.      Diagnosis: Type 1 diabetes mellitus with chronic kidney disease on chronic dialysis  Assessment and Plan of Treatment: Type 1 diabetes mellitus with chronic kidney disease on chronic dialysis    Diagnosis: ESRD (end stage renal disease)  Assessment and Plan of Treatment: Resume Hemodialysis Monday, Tuesday, thursday and saturday  Strict renal diet - no concentrated potassium, no concentrated phosphorus, low salt diet with fluid restriction of 1L /day    Diagnosis: Chronic systolic congestive heart failure  Assessment and Plan of Treatment: Resume Hemodialysis Monday, Tuesday, thursday and saturday  Strict renal diet - no concentrated potassium, no concentrated phosphorus, low salt diet with fluid restriction of 1L /day PRINCIPAL DISCHARGE DIAGNOSIS  Diagnosis: Multifocal pneumonia  Assessment and Plan of Treatment: Pneumonia is a lung infection that can cause a fever, cough, and trouble breathing.  Completed antibiotics  Call your health care provider upon arrival home from hospital and make a follow up appointment for one week.  If your cough worsens, you develop fever greater than 101', you have shaking chills, a fast heartbeat, trouble breathing and/or feel your are breathing much faster than usual, call your healthcare provider.  Make sure you wash your hands frequently.        SECONDARY DISCHARGE DIAGNOSES  Diagnosis: Parainfluenza  Assessment and Plan of Treatment: Now improved  If your cough worsens, you develop fever greater than 101', you have shaking chills, a fast heartbeat, trouble breathing and/or feel your are breathing much faster than usual, call your healthcare provider.    Diagnosis: Sepsis, due to unspecified organism  Assessment and Plan of Treatment: Due to Vial infection and pneumonia  Call you Health care provider upon arrival home to make a one week follow up appointment.  If you develop fever, chills, malaise, or change in mental status call your Health Care Provider or go to the Emergency Department.  Nutrition is important, eat small frequent meals to help ensure you get adequate calories.  Do not stay in bed all day!  Increase your activity daily as tolerated.      Diagnosis: Type 1 diabetes mellitus with chronic kidney disease on chronic dialysis  Assessment and Plan of Treatment: Continue insulin as recommended  Follow up with Endocrine - Dr. Ley in 1 week  HgA1C this admission - 8.2  Make sure you get your HgA1c checked every three months.  If you take insulin, check your blood glucose before meals and at bedtime.  It's important not to skip any meals.  Keep a log of your blood glucose results and always take it with you to your doctor appointments.  Keep a list of your current medications including injectables and over the counter medications and bring this medication list with you to all your doctor appointments.  If you have not seen your ophthalmologist this year call for appointment.  Check your feet daily for redness, sores, or openings. Do not self treat. If no improvement in two days call your primary care physician for an appointment.  Low blood sugar (hypoglycemia) is a blood sugar below 70mg/dl. Check your blood sugar if you feel signs/symptoms of hypoglycemia. If your blood sugar is below 70 take 15 grams of carbohydrates (ex 4 oz of apple juice, 3-4 glucose tablets, or 4-6 oz of regular soda) wait 15 minutes and repeat blood sugar to make sure it comes up above 70.  If your blood sugar is above 70 and you are due for a meal, have a meal.  If you are not due for a meal have a snack.  This snack helps keeps your blood sugar at a safe range.      Diagnosis: ESRD (end stage renal disease)  Assessment and Plan of Treatment: Resume Hemodialysis Monday, Tuesday, thursday and saturday  Strict renal diet - no concentrated potassium, no concentrated phosphorus, low salt diet with fluid restriction of 1L /day    Diagnosis: Chronic systolic congestive heart failure  Assessment and Plan of Treatment: Resume Hemodialysis Monday, Tuesday, thursday and saturday  Strict renal diet - no concentrated potassium, no concentrated phosphorus, low salt diet with fluid restriction of 1L /day

## 2019-06-21 NOTE — DISCHARGE NOTE NURSING/CASE MANAGEMENT/SOCIAL WORK - NSDCDPATPORTLINK_GEN_ALL_CORE
You can access the [facility] Patient Portal, offered by [facility], by registering with the following website: [email or web address]

## 2019-06-21 NOTE — PROGRESS NOTE ADULT - SUBJECTIVE AND OBJECTIVE BOX
CC: f/u for pneumonia    Patient reports feels well    REVIEW OF SYSTEMS:  All other review of systems negative (Comprehensive ROS)    Antimicrobials Day #  :    Other Medications Reviewed    T(F): 98.3 (06-21-19 @ 11:02), Max: 98.3 (06-21-19 @ 11:02)  HR: 71 (06-21-19 @ 11:02)  BP: 133/69 (06-21-19 @ 11:02)  RR: 17 (06-21-19 @ 11:02)  SpO2: 97% (06-21-19 @ 11:02)  Wt(kg): --    PHYSICAL EXAM:  General: alert, no acute distress  Eyes:  anicteric, no conjunctival injection, no discharge  Oropharynx: no lesions or injection 	  Neck: supple, without adenopathy  Lungs: clear to auscultation  Heart: regular rate and rhythm; no murmur, rubs or gallops  Abdomen: soft, nondistended, nontender, without mass or organomegaly  Skin: no lesions  Extremities: no clubbing, cyanosis,. left  leg edema  Neurologic: alert, oriented, moves all extremities    LAB RESULTS:                        9.4    4.8   )-----------( 198      ( 20 Jun 2019 03:59 )             27.2     06-21    134<L>  |  94<L>  |  17  ----------------------------<  401<H>  3.7   |  27  |  2.95<H>    Ca    9.7      21 Jun 2019 05:37  Phos  3.5     06-20  Mg     2.1     06-20          MICROBIOLOGY:  RECENT CULTURES:      RADIOLOGY REVIEWED:    < from: CT Abdomen and Pelvis No Cont (06.15.19 @ 21:57) >    IMPRESSION:     New left lower lobe and lingular lung consolidations and patchy nodular   airspace opacities in the right lower lobe concerning for multifocal   pneumonia.    Intrahepatic and extrahepatic biliary ductal dilatation, unchanged.        < end of copied text >    Assessment:  Patient with esrd admitted parainfluenza and pneumonia, today last day of zosyn  Plan:  stop zosyn after today then no further antibiotics  no ID objection to discharge

## 2019-06-21 NOTE — DISCHARGE NOTE PROVIDER - CARE PROVIDER_API CALL
Braden Thompson (DO)  Internal Medicine; Pulmonary Disease  [address]  [place] [address]  Phone: [phone]  Fax: [phone]  Follow Up Time: 1 week    Daisy Burger (DO)  Nephrology  [address]  [place] [address]  Phone: [phone]  Fax: [phone]  Follow Up Time: 1-3 days Braden Thompson (DO)  Internal Medicine; Pulmonary Disease  [address]  [place] [address]  Phone: [phone]  Fax: [phone]  Follow Up Time: 1 week    Daisy Burger (DO)  Nephrology  [address]  [place] [address]  Phone: [phone]  Fax: [phone]  Follow Up Time: 1-3 days    Arsalan Castro)  Internal Medicine  [address] [place] [address]  Phone: [phone]  Fax: [phone]  Follow Up Time: 1-3 days    Tee Biswas)  Cardiovascular Disease; Internal Medicine  [address] [address] [place] [address]  Phone: [phone]  Fax: [phone]  Follow Up Time: 1 week

## 2019-06-21 NOTE — CHART NOTE - NSCHARTNOTEFT_GEN_A_CORE
Home med Lasix not resumed on discharge per Dr. Burger. Request from Dr. Viera to facilitate patient discharge. Medication reconciliation reviewed, revised, and resolved with Dr. Viera who had medically cleared patient for discharge with follow-up as advised. Please refer to discharge note for detailed hospital course. Patient is currently stable for discharge to home at this time.    Contact # [phone]

## 2019-06-21 NOTE — PROGRESS NOTE ADULT - REASON FOR ADMISSION
chest pain, SOB
chest pain, SOB> PNA

## 2019-06-21 NOTE — PROGRESS NOTE ADULT - PROBLEM SELECTOR PROBLEM 1
Type 1 diabetes mellitus with chronic kidney disease on chronic dialysis

## 2019-06-26 NOTE — ED ADULT NURSE NOTE - MUSCULOSKELETAL WDL
Patient expressed no known problems or needs
Full range of motion of upper and lower extremities, no joint tenderness/swelling.

## 2019-06-28 ENCOUNTER — APPOINTMENT (OUTPATIENT)
Dept: PULMONOLOGY | Facility: CLINIC | Age: 64
End: 2019-06-28
Payer: MEDICARE

## 2019-06-28 VITALS
SYSTOLIC BLOOD PRESSURE: 106 MMHG | OXYGEN SATURATION: 97 % | BODY MASS INDEX: 19.12 KG/M2 | TEMPERATURE: 98 F | DIASTOLIC BLOOD PRESSURE: 64 MMHG | HEIGHT: 64 IN | WEIGHT: 112 LBS | HEART RATE: 85 BPM | RESPIRATION RATE: 16 BRPM

## 2019-06-28 DIAGNOSIS — J15.9 UNSPECIFIED BACTERIAL PNEUMONIA: ICD-10-CM

## 2019-06-28 DIAGNOSIS — I50.22 CHRONIC SYSTOLIC (CONGESTIVE) HEART FAILURE: ICD-10-CM

## 2019-06-28 PROCEDURE — 99496 TRANSJ CARE MGMT HIGH F2F 7D: CPT | Mod: 25

## 2019-06-28 PROCEDURE — 94010 BREATHING CAPACITY TEST: CPT

## 2019-06-28 PROCEDURE — 94729 DIFFUSING CAPACITY: CPT

## 2019-06-28 PROCEDURE — 94727 GAS DIL/WSHOT DETER LNG VOL: CPT

## 2019-06-28 PROCEDURE — 71046 X-RAY EXAM CHEST 2 VIEWS: CPT

## 2019-06-28 NOTE — REVIEW OF SYSTEMS
[As Noted in HPI] : as noted in HPI [Edema] : ~T edema was present [Claudication] : intermittent claudication [Diabetes] : diabetes mellitus [Negative] : Neurologic [PND] : no PND [Chest Discomfort] : no chest discomfort [Orthopnea] : no orthopnea [FreeTextEntry8] : POST EBUS with Med Bx negative flow cytology/ LN pathology Reactive  [FreeTextEntry9] : CHF ASHD [de-identified] : s/p DKA hospital admission

## 2019-06-28 NOTE — HISTORY OF PRESENT ILLNESS
[None] : ~He/She~ has no significant interval events [Difficulty Breathing During Exertion] : stable dyspnea on exertion [Feelings Of Weakness On Exertion] : stable exercise intolerance [Cough] : denies coughing [Wheezing] : denies wheezing [Regional Soft Tissue Swelling Both Lower Extremities] : stable lower extremity edema [Chest Pain Or Discomfort] : denies chest pain [Fever] : denies fever [Former] : is a former smoker [___ Year Quit] : ~He/She~ quit smoking in [unfilled] [Wt Gain ___ Lbs] : no recent weight gain [Wt Loss ___ Lbs] : no recent weight loss [Oxygen] : the patient uses no supplemental oxygen [FreeTextEntry1] : hx E bus and mediastinoscopy cardiothoracic surgery to rule out lymphoma, rule out lung cancer with non diagnostic data\par History severe CHF\par Renal failure on hemodialysis\par Diabetes mellitus with history of recurrent episodes of DKA requiring hospitalization\par ASD\par Atherosclerotic heart disease \par pleural effusions secondary to congestive heart failure cardiac disease with fluid overload\par COPD\par Psychiatric disorder\par s/p hospital admission with LLL pneumonia and completed antibx\par  No sputum fever or worsening \par  neg Chest pain

## 2019-06-28 NOTE — PROCEDURE
[FreeTextEntry1] : \par PFT without BD\par June 28 2019\par  Moderate reduction flow rates\par  Air trapping Spirometry 5/31 2019\par moderate OAD\par severe reduction DLCO with loss fx alveolar capillary units\par \par End-tidal CO2 36\par Chest x-ray 10 7209 status post left lower lobe pneumonia\par Borderline cardiomegaly\par Fracture rib right upper lung zone with healing\par And a left lower lobe pneumonia [facility]\par Negative for pleural effusion\par Soft tissue and bony structures with thinning of the vertebral spine\par \par PFT January 11, 2019\par Moderate reduction in flow rates with an obstructive impairment\par Lung volumes consistent with air trapping with RV/TLC ratio 152% of predicted.\par Diffusion 68% of predicted with a loss of mild functioning alveolar capillary units\par Chronically low hemoglobin noted today at 8.9

## 2019-06-28 NOTE — DISCUSSION/SUMMARY
[FreeTextEntry1] : s/p LLL Pneumonia resolved\par COPD\par Congestive heart failure\par Renal failure on dialysis\par Status post fall with facial trauma no reported subdural hematoma\par \par March 6, 2019\par Addendum\par Underwent bronchoscopy E bus mediastinoscopy\par AFB tissue culture negative\par Flow cytometry no diagnostic abnormalities\par \par Post cardiothoracic surgery evaluation\par As noted pathology post E bus hysteroscopy March 14, 2019 was negative for tumor\par CT chest done while hospitalized for a fall at [place] on April 18, 2019 findings suggested mild pulmonary edema\par Decrease in size of the mediastinal adenopathy compared to prior exam\par Agree with cardiothoracic surgery 3-month follow-up CAT scan of the chest

## 2019-06-28 NOTE — PHYSICAL EXAM
[General Appearance - Well Developed] : well developed [Normal Appearance] : normal appearance [Well Groomed] : well groomed [General Appearance - Well Nourished] : well nourished [No Deformities] : no deformities [General Appearance - In No Acute Distress] : no acute distress [Normal Conjunctiva] : the conjunctiva exhibited no abnormalities [I] : I [Normal Oropharynx] : normal oropharynx [Neck Appearance] : the appearance of the neck was normal [Neck Cervical Mass (___cm)] : no neck mass was observed [Jugular Venous Distention Increased] : there was no jugular-venous distention [Thyroid Diffuse Enlargement] : the thyroid was not enlarged [Heart Rate And Rhythm] : heart rate and rhythm were normal [Heart Sounds] : normal S1 and S2 [Murmurs] : no murmurs present [Exaggerated Use Of Accessory Muscles For Inspiration] : no accessory muscle use [Respiration, Rhythm And Depth] : normal respiratory rhythm and effort [Lungs Percussion] : the lungs were normal to percussion [Chest Palpation] : palpation of the chest revealed no abnormalities [Abdomen Soft] : soft [Bowel Sounds] : normal bowel sounds [Abdomen Tenderness] : non-tender [Nail Clubbing] : no clubbing of the fingernails [Abdomen Mass (___ Cm)] : no abdominal mass palpated [Petechial Hemorrhages (___cm)] : no petechial hemorrhages [Cyanosis, Localized] : no localized cyanosis [] : no ischemic changes [Deep Tendon Reflexes (DTR)] : deep tendon reflexes were 2+ and symmetric [Sensation] : the sensory exam was normal to light touch and pinprick [No Focal Deficits] : no focal deficits [Oriented To Time, Place, And Person] : oriented to person, place, and time [FreeTextEntry1] : distant BS with mild prolonged expiratory phase

## 2019-07-01 ENCOUNTER — EMERGENCY (EMERGENCY)
Facility: HOSPITAL | Age: 64
LOS: 1 days | Discharge: HOME | End: 2019-07-01
Attending: EMERGENCY MEDICINE
Payer: MEDICARE

## 2019-07-01 VITALS
WEIGHT: 111.99 LBS | HEART RATE: 82 BPM | TEMPERATURE: 98 F | SYSTOLIC BLOOD PRESSURE: 126 MMHG | DIASTOLIC BLOOD PRESSURE: 68 MMHG | OXYGEN SATURATION: 97 % | RESPIRATION RATE: 18 BRPM | HEIGHT: 62 IN

## 2019-07-01 VITALS
OXYGEN SATURATION: 99 % | HEART RATE: 82 BPM | SYSTOLIC BLOOD PRESSURE: 158 MMHG | DIASTOLIC BLOOD PRESSURE: 81 MMHG | TEMPERATURE: 98 F | RESPIRATION RATE: 16 BRPM

## 2019-07-01 DIAGNOSIS — Z98.89 OTHER SPECIFIED POSTPROCEDURAL STATES: Chronic | ICD-10-CM

## 2019-07-01 LAB
ALBUMIN SERPL ELPH-MCNC: 4.4 G/DL — SIGNIFICANT CHANGE UP (ref 3.3–5)
ALP SERPL-CCNC: 77 U/L — SIGNIFICANT CHANGE UP (ref 40–120)
ALT FLD-CCNC: 20 U/L — SIGNIFICANT CHANGE UP (ref 10–45)
ANION GAP SERPL CALC-SCNC: 18 MMOL/L — HIGH (ref 5–17)
AST SERPL-CCNC: 27 U/L — SIGNIFICANT CHANGE UP (ref 10–40)
BASE EXCESS BLDV CALC-SCNC: 3.3 MMOL/L — HIGH (ref -2–2)
BASOPHILS # BLD AUTO: 0.1 K/UL — SIGNIFICANT CHANGE UP (ref 0–0.2)
BASOPHILS NFR BLD AUTO: 0.7 % — SIGNIFICANT CHANGE UP (ref 0–2)
BILIRUB SERPL-MCNC: 0.3 MG/DL — SIGNIFICANT CHANGE UP (ref 0.2–1.2)
BUN SERPL-MCNC: 47 MG/DL — HIGH (ref 7–23)
CA-I SERPL-SCNC: 1.31 MMOL/L — HIGH (ref 1.12–1.3)
CALCIUM SERPL-MCNC: 10.4 MG/DL — SIGNIFICANT CHANGE UP (ref 8.4–10.5)
CHLORIDE BLDV-SCNC: 97 MMOL/L — SIGNIFICANT CHANGE UP (ref 96–108)
CHLORIDE SERPL-SCNC: 92 MMOL/L — LOW (ref 96–108)
CO2 BLDV-SCNC: 31 MMOL/L — HIGH (ref 22–30)
CO2 SERPL-SCNC: 27 MMOL/L — SIGNIFICANT CHANGE UP (ref 22–31)
CREAT SERPL-MCNC: 5.65 MG/DL — HIGH (ref 0.5–1.3)
EOSINOPHIL # BLD AUTO: 0.1 K/UL — SIGNIFICANT CHANGE UP (ref 0–0.5)
EOSINOPHIL NFR BLD AUTO: 1.2 % — SIGNIFICANT CHANGE UP (ref 0–6)
GAS PNL BLDV: 140 MMOL/L — SIGNIFICANT CHANGE UP (ref 135–145)
GAS PNL BLDV: SIGNIFICANT CHANGE UP
GAS PNL BLDV: SIGNIFICANT CHANGE UP
GLUCOSE BLDV-MCNC: 144 MG/DL — HIGH (ref 70–99)
GLUCOSE SERPL-MCNC: 156 MG/DL — HIGH (ref 70–99)
HCO3 BLDV-SCNC: 29 MMOL/L — SIGNIFICANT CHANGE UP (ref 21–29)
HCT VFR BLD CALC: 30.6 % — LOW (ref 34.5–45)
HCT VFR BLDA CALC: 31 % — LOW (ref 39–50)
HGB BLD CALC-MCNC: 10 G/DL — LOW (ref 11.5–15.5)
HGB BLD-MCNC: 10.2 G/DL — LOW (ref 11.5–15.5)
LACTATE BLDV-MCNC: 1.3 MMOL/L — SIGNIFICANT CHANGE UP (ref 0.7–2)
LIDOCAIN IGE QN: 36 U/L — SIGNIFICANT CHANGE UP (ref 7–60)
LYMPHOCYTES # BLD AUTO: 1 K/UL — SIGNIFICANT CHANGE UP (ref 1–3.3)
LYMPHOCYTES # BLD AUTO: 12.9 % — LOW (ref 13–44)
MACROCYTES BLD QL: SLIGHT — SIGNIFICANT CHANGE UP
MCHC RBC-ENTMCNC: 33.4 GM/DL — SIGNIFICANT CHANGE UP (ref 32–36)
MCHC RBC-ENTMCNC: 36 PG — HIGH (ref 27–34)
MCV RBC AUTO: 108 FL — HIGH (ref 80–100)
MONOCYTES # BLD AUTO: 0.8 K/UL — SIGNIFICANT CHANGE UP (ref 0–0.9)
MONOCYTES NFR BLD AUTO: 10.4 % — SIGNIFICANT CHANGE UP (ref 2–14)
NEUTROPHILS # BLD AUTO: 5.5 K/UL — SIGNIFICANT CHANGE UP (ref 1.8–7.4)
NEUTROPHILS NFR BLD AUTO: 74.8 % — SIGNIFICANT CHANGE UP (ref 43–77)
PCO2 BLDV: 56 MMHG — HIGH (ref 35–50)
PH BLDV: 7.34 — LOW (ref 7.35–7.45)
PLAT MORPH BLD: NORMAL — SIGNIFICANT CHANGE UP
PLATELET # BLD AUTO: 263 K/UL — SIGNIFICANT CHANGE UP (ref 150–400)
PO2 BLDV: 22 MMHG — LOW (ref 25–45)
POTASSIUM BLDV-SCNC: 4.8 MMOL/L — SIGNIFICANT CHANGE UP (ref 3.5–5.3)
POTASSIUM SERPL-MCNC: 4.9 MMOL/L — SIGNIFICANT CHANGE UP (ref 3.5–5.3)
POTASSIUM SERPL-SCNC: 4.9 MMOL/L — SIGNIFICANT CHANGE UP (ref 3.5–5.3)
PROT SERPL-MCNC: 7.3 G/DL — SIGNIFICANT CHANGE UP (ref 6–8.3)
RBC # BLD: 2.84 M/UL — LOW (ref 3.8–5.2)
RBC # FLD: 13.4 % — SIGNIFICANT CHANGE UP (ref 10.3–14.5)
RBC BLD AUTO: ABNORMAL
SAO2 % BLDV: 24 % — LOW (ref 67–88)
SCHISTOCYTES BLD QL AUTO: SLIGHT — SIGNIFICANT CHANGE UP
SODIUM SERPL-SCNC: 137 MMOL/L — SIGNIFICANT CHANGE UP (ref 135–145)
SPHEROCYTES BLD QL SMEAR: SLIGHT — SIGNIFICANT CHANGE UP
WBC # BLD: 7.4 K/UL — SIGNIFICANT CHANGE UP (ref 3.8–10.5)
WBC # FLD AUTO: 7.4 K/UL — SIGNIFICANT CHANGE UP (ref 3.8–10.5)

## 2019-07-01 PROCEDURE — 82565 ASSAY OF CREATININE: CPT

## 2019-07-01 PROCEDURE — 84295 ASSAY OF SERUM SODIUM: CPT

## 2019-07-01 PROCEDURE — 80053 COMPREHEN METABOLIC PANEL: CPT

## 2019-07-01 PROCEDURE — 82435 ASSAY OF BLOOD CHLORIDE: CPT

## 2019-07-01 PROCEDURE — 99284 EMERGENCY DEPT VISIT MOD MDM: CPT | Mod: GC

## 2019-07-01 PROCEDURE — 99283 EMERGENCY DEPT VISIT LOW MDM: CPT

## 2019-07-01 PROCEDURE — 82330 ASSAY OF CALCIUM: CPT

## 2019-07-01 PROCEDURE — 83605 ASSAY OF LACTIC ACID: CPT

## 2019-07-01 PROCEDURE — 84132 ASSAY OF SERUM POTASSIUM: CPT

## 2019-07-01 PROCEDURE — 82803 BLOOD GASES ANY COMBINATION: CPT

## 2019-07-01 PROCEDURE — 82962 GLUCOSE BLOOD TEST: CPT

## 2019-07-01 PROCEDURE — 85027 COMPLETE CBC AUTOMATED: CPT

## 2019-07-01 PROCEDURE — 82947 ASSAY GLUCOSE BLOOD QUANT: CPT

## 2019-07-01 PROCEDURE — 93005 ELECTROCARDIOGRAM TRACING: CPT

## 2019-07-01 PROCEDURE — 85014 HEMATOCRIT: CPT

## 2019-07-01 PROCEDURE — 93010 ELECTROCARDIOGRAM REPORT: CPT | Mod: GC

## 2019-07-01 PROCEDURE — 83690 ASSAY OF LIPASE: CPT

## 2019-07-01 RX ADMIN — Medication 30 MILLILITER(S): at 22:36

## 2019-07-01 NOTE — ED PROVIDER NOTE - ATTENDING CONTRIBUTION TO CARE
Attending MD Wright:  I personally have seen and examined this patient.  Resident note reviewed and agree on plan of care and except where noted.  See HPI, PE, and MDM for details.      63F with ESRD on HD, brittle DM presenting for epigastric abd pain which resolved upon arrival to ED. Exam with mild epigastric ttp that was distractible. Labs nonactionable, specifically no lipase elevation to suggest pancreatitis. Patient at this juncture does not warrant abdominal imaging, tolerated PO in ED and requesting discharge. Return precautions reviewed with patient

## 2019-07-01 NOTE — ED PROVIDER NOTE - CONSTITUTIONAL, MLM
normal... Thin, well appearing,, awake, alert, oriented to person, place, time/situation and in no apparent distress.

## 2019-07-01 NOTE — ED PROVIDER NOTE - PROGRESS NOTE DETAILS
Barber PGY3: Patient feels improved, will have  pick her up, abd soft, ntnd. Labs reviewed, stable for  dc Sunil PGY3: Patient feels improved, will have  pick her up, abd soft, ntnd. Labs reviewed, tolerated [place], stable for  dc

## 2019-07-01 NOTE — ED ADULT NURSE NOTE - NSIMPLEMENTINTERV_GEN_ALL_ED
Implemented All Fall Risk Interventions:  [place] to call system. Call bell, personal items and telephone within reach. Instruct patient to call for assistance. Room bathroom lighting operational. Non-slip footwear when patient is off stretcher. Physically safe environment: no spills, clutter or unnecessary equipment. Stretcher in lowest position, wheels locked, appropriate side rails in place. Provide visual cue, wrist band, yellow gown, etc. Monitor gait and stability. Monitor for mental status changes and reorient to person, place, and time. Review medications for side effects contributing to fall risk. Reinforce activity limits and safety measures with patient and family.

## 2019-07-01 NOTE — ED PROVIDER NOTE - OBJECTIVE STATEMENT
63F c hx CAD (Cath Feb '19 showing 99% RCA, 100% RPLS, 100% Cx) s/p multiple stents/brachytherapy, ESRD (on HD M/T/T/Sa), DM1 c/b neuropathy and retinopathy, CHF (EF 30% per last [facility]), mod MR, severe AS, RHF, TIA, severe PVD s/p b/l fempop bypass, left subclavian vein stenosis s/p stent, COPD not on O2, gout, hilar lymphadenopathy of unknown etiology, poor medical mgmt at home, frequent hospitalizations (usually 1-3 times a month) for DKA, hyperglycemia, PNA, recently discharged 3 days ago for hyperkalemia, pw SOB, coughing, occasional fevers and chills. CXR with new patchy L basilar opasity. Pt now started on Zosyn and Zithromax for possible multifocal pna seen on CT.  Patient also positive for parainfluenza infection with superimposed LLL infiltrate. Pulmonary consulted. She is not adherent to inhalers, PFTs showing moderate obstructive disease(outpatient) 63F c hx CAD (Cath Feb '19 showing 99% RCA, 100% RPLS, 100% Cx) s/p multiple stents/brachytherapy, ESRD (on HD M/T/T/Sa), DM1 c/b neuropathy and retinopathy, CHF (EF 30% per last [facility]), mod MR, severe AS, RHF, TIA, severe PVD s/p b/l fempop bypass, left subclavian vein stenosis s/p stent, COPD not on O2, gout, hilar lymphadenopathy of unknown etiology, poor medical mgmt at home, frequent hospitalizations (usually 1-3 times a month) for DKA, hyperglycemia, recently admitted for PNA, presents w/ nausea, epigastric abd pain, and 'vomiting' since 3PM today, pt states emesis is mostly mucous and is posttussive, notes that she has been coughing a lot but it has since improved since her last admission. Notes she is still passing flatus, last stool yesterday, No new fevers/ chills. States she has been eating/drinking well. No new LE edema, pt states RLE is always larger than left. No chest pain. 63F c hx CAD (Cath Feb '19 showing 99% RCA, 100% RPLS, 100% Cx) s/p multiple stents/brachytherapy, ESRD (on HD M/T/T/Sa), DM1 c/b neuropathy and retinopathy, CHF (EF 30% per last [facility]), mod MR, severe AS, RHF, TIA, severe PVD s/p b/l fempop bypass, left subclavian vein stenosis s/p stent, COPD not on O2, gout, hilar lymphadenopathy of unknown etiology, poor medical mgmt at home, frequent hospitalizations (usually 1-3 times a month) for DKA, hyperglycemia, recently admitted for PNA, presents w/ nausea, epigastric abd pain, and 'vomiting' since 3PM today, pt states emesis is mostly mucous and is posttussive, notes that she has been coughing a lot but it has since improved since her last admission. Notes she is still passing flatus, last stool today this afternoon, normal. No new fevers/ chills. States she has been eating/drinking well. No new LE edema, pt states RLE is always larger than left. No chest pain.

## 2019-07-01 NOTE — ED ADULT NURSE REASSESSMENT NOTE - NS ED NURSE REASSESS COMMENT FT1
Pt reports feels better since medication administered as per MD order. MD Barber made aware. Pt reports feels better since medication administered as per MD order. Pt denies abd pain, N/V. MD Barber made aware.

## 2019-07-01 NOTE — ED ADULT NURSE NOTE - CHPI ED NUR SYMPTOMS NEG
no abdominal distension/no diarrhea/no dysuria/no blood in stool/no burning urination/no fever/no chills/no hematuria

## 2019-07-01 NOTE — ED ADULT NURSE NOTE - OBJECTIVE STATEMENT
Pt is 63 Y A&O x3 F with hx of DM, COPD, CHF, CVA, HLD, dialysis MWF last session today presenting to ED via EMS from home with c/o 6/10 abd pain, nausea, and vomiting x4 since 3 PM. Pt arrives with 18 G to R AC frm EMS, flushes well with + blood return. Pt denies fevers, CP, SOB, diarrhea. Pt is breathing unlabored on RA. Abd soft and non-distended. Diffuse abd tenderness noted upon palpation. Skin is warm and dry. Pt able to move UE and LE b/l. Safety and comfort maintained. Awaiting MD evaluation. Pt is 63 Y A&O x3 F with hx of DM, COPD, CHF, CVA, HLD, dialysis Mon, Tues, Thurs, Sat last session today presenting to ED via EMS from home with c/o 6/10 abd pain, nausea, and vomiting x4 since 3 PM. Pt arrives with 18 G to R AC frm EMS, flushes well with + blood return. Pt denies fevers, CP, SOB, diarrhea. Pt is breathing unlabored on RA. Abd soft and non-distended. Diffuse abd tenderness noted upon palpation. Skin is warm and dry. Pt able to move UE and LE b/l. Safety and comfort maintained. Awaiting MD evaluation. Pt is 63 Y A&O x3 F with hx of DM, COPD, CHF, CVA, HLD, dialysis Mon, Tues, Thurs, Sat last session today presenting to ED via EMS from home with c/o 6/10 abd pain, nausea, and vomiting x4 since 3 PM. Pt arrives with 18 G to R AC from EMS, flushes well with + blood return. Pt denies fevers, CP, SOB, diarrhea. Pt is breathing unlabored on RA. Abd soft and non-distended. Diffuse abd tenderness noted upon palpation. Skin is warm and dry. Fistula site noted to L AC, dressing dry and intact. Pink band in place to left UE. Pt able to move UE and LE b/l. Safety and comfort maintained. Awaiting MD evaluation.

## 2019-07-01 NOTE — ED PROVIDER NOTE - CLINICAL SUMMARY MEDICAL DECISION MAKING FREE TEXT BOX
63F with multiple comorbidities and multiple hospitalizations for dka due to medication noncompliance, last dialysis today, abd pain mild epigastric, nausea on history appears post-tussive, pt well appearing, will check labs, reassess abd exam and +/- CT scan

## 2019-07-01 NOTE — ED PROVIDER NOTE - NSFOLLOWUPINSTRUCTIONS_ED_ALL_ED_FT
You were seen today for abdominal pain that improved with maalox.     You should follow up with your primary care doctor within 1-3 days for a reassessment.     Please return to the emergency room if you develop severe chest pain, shortness of breath, severe abdominal pain, nausea, vomiting, or difficulty eating.     Your labs were within normal limits today.     If your pain persists, consider following up with a gastroenterologist this week.

## 2019-07-05 NOTE — PROGRESS NOTE ADULT - I WAS PHYSICALLY PRESENT FOR THE KEY PORTIONS OF THE EVALUATION AND MANAGEMENT (E/M) SERVICE PROVIDED.  I AGREE WITH THE ABOVE HISTORY, PHYSICAL, AND PLAN WHICH I HAVE REVIEWED AND EDITED WHERE APPROPRIATE
Statement Selected
no concerns

## 2019-07-10 ENCOUNTER — INPATIENT (INPATIENT)
Facility: HOSPITAL | Age: 64
LOS: 4 days | Discharge: HOME | DRG: 193 | End: 2019-07-15
Attending: INTERNAL MEDICINE | Admitting: INTERNAL MEDICINE
Payer: MEDICARE

## 2019-07-10 VITALS
HEART RATE: 80 BPM | DIASTOLIC BLOOD PRESSURE: 70 MMHG | OXYGEN SATURATION: 100 % | HEIGHT: 64 IN | TEMPERATURE: 99 F | WEIGHT: 111.99 LBS | SYSTOLIC BLOOD PRESSURE: 145 MMHG | RESPIRATION RATE: 20 BRPM

## 2019-07-10 DIAGNOSIS — Z98.89 OTHER SPECIFIED POSTPROCEDURAL STATES: Chronic | ICD-10-CM

## 2019-07-10 LAB
ALBUMIN SERPL ELPH-MCNC: 4.7 G/DL — SIGNIFICANT CHANGE UP (ref 3.3–5)
ALP SERPL-CCNC: 79 U/L — SIGNIFICANT CHANGE UP (ref 40–120)
ALT FLD-CCNC: 25 U/L — SIGNIFICANT CHANGE UP (ref 10–45)
ANION GAP SERPL CALC-SCNC: 20 MMOL/L — HIGH (ref 5–17)
APTT BLD: 30.5 SEC — SIGNIFICANT CHANGE UP (ref 27.5–36.3)
AST SERPL-CCNC: 41 U/L — HIGH (ref 10–40)
BASOPHILS # BLD AUTO: 0 K/UL — SIGNIFICANT CHANGE UP (ref 0–0.2)
BASOPHILS NFR BLD AUTO: 0.5 % — SIGNIFICANT CHANGE UP (ref 0–2)
BILIRUB SERPL-MCNC: 0.4 MG/DL — SIGNIFICANT CHANGE UP (ref 0.2–1.2)
BUN SERPL-MCNC: 43 MG/DL — HIGH (ref 7–23)
CALCIUM SERPL-MCNC: 10.3 MG/DL — SIGNIFICANT CHANGE UP (ref 8.4–10.5)
CHLORIDE SERPL-SCNC: 94 MMOL/L — LOW (ref 96–108)
CO2 SERPL-SCNC: 24 MMOL/L — SIGNIFICANT CHANGE UP (ref 22–31)
CREAT SERPL-MCNC: 5.07 MG/DL — HIGH (ref 0.5–1.3)
EOSINOPHIL # BLD AUTO: 0.2 K/UL — SIGNIFICANT CHANGE UP (ref 0–0.5)
EOSINOPHIL NFR BLD AUTO: 3.2 % — SIGNIFICANT CHANGE UP (ref 0–6)
GAS PNL BLDV: SIGNIFICANT CHANGE UP
GLUCOSE SERPL-MCNC: 182 MG/DL — HIGH (ref 70–99)
HCT VFR BLD CALC: 36.4 % — SIGNIFICANT CHANGE UP (ref 34.5–45)
HGB BLD-MCNC: 12 G/DL — SIGNIFICANT CHANGE UP (ref 11.5–15.5)
INR BLD: 0.97 RATIO — SIGNIFICANT CHANGE UP (ref 0.88–1.16)
LYMPHOCYTES # BLD AUTO: 1.2 K/UL — SIGNIFICANT CHANGE UP (ref 1–3.3)
LYMPHOCYTES # BLD AUTO: 21.1 % — SIGNIFICANT CHANGE UP (ref 13–44)
MCHC RBC-ENTMCNC: 33 GM/DL — SIGNIFICANT CHANGE UP (ref 32–36)
MCHC RBC-ENTMCNC: 35.1 PG — HIGH (ref 27–34)
MCV RBC AUTO: 106 FL — HIGH (ref 80–100)
MONOCYTES # BLD AUTO: 0.7 K/UL — SIGNIFICANT CHANGE UP (ref 0–0.9)
MONOCYTES NFR BLD AUTO: 11.2 % — SIGNIFICANT CHANGE UP (ref 2–14)
NEUTROPHILS # BLD AUTO: 3.8 K/UL — SIGNIFICANT CHANGE UP (ref 1.8–7.4)
NEUTROPHILS NFR BLD AUTO: 64.1 % — SIGNIFICANT CHANGE UP (ref 43–77)
NT-PROBNP SERPL-SCNC: HIGH PG/ML (ref 0–300)
PLATELET # BLD AUTO: 252 K/UL — SIGNIFICANT CHANGE UP (ref 150–400)
POTASSIUM SERPL-MCNC: 5.6 MMOL/L — HIGH (ref 3.5–5.3)
POTASSIUM SERPL-SCNC: 5.6 MMOL/L — HIGH (ref 3.5–5.3)
PROT SERPL-MCNC: 7.9 G/DL — SIGNIFICANT CHANGE UP (ref 6–8.3)
PROTHROM AB SERPL-ACNC: 11.2 SEC — SIGNIFICANT CHANGE UP (ref 10–12.9)
RBC # BLD: 3.43 M/UL — LOW (ref 3.8–5.2)
RBC # FLD: 13.3 % — SIGNIFICANT CHANGE UP (ref 10.3–14.5)
SODIUM SERPL-SCNC: 138 MMOL/L — SIGNIFICANT CHANGE UP (ref 135–145)
TROPONIN T, HIGH SENSITIVITY RESULT: 216 NG/L — HIGH (ref 0–51)
WBC # BLD: 5.8 K/UL — SIGNIFICANT CHANGE UP (ref 3.8–10.5)
WBC # FLD AUTO: 5.8 K/UL — SIGNIFICANT CHANGE UP (ref 3.8–10.5)

## 2019-07-10 PROCEDURE — 99284 EMERGENCY DEPT VISIT MOD MDM: CPT | Mod: GC

## 2019-07-10 PROCEDURE — 71045 X-RAY EXAM CHEST 1 VIEW: CPT | Mod: 26

## 2019-07-10 RX ORDER — INSULIN HUMAN 100 [IU]/ML
3 INJECTION, SOLUTION SUBCUTANEOUS ONCE
Refills: 0 | Status: COMPLETED | OUTPATIENT
Start: 2019-07-10 | End: 2019-07-10

## 2019-07-10 RX ORDER — ASPIRIN/CALCIUM CARB/MAGNESIUM 324 MG
162 TABLET ORAL DAILY
Refills: 0 | Status: DISCONTINUED | OUTPATIENT
Start: 2019-07-10 | End: 2019-07-11

## 2019-07-10 RX ORDER — CALCIUM GLUCONATE 100 MG/ML
2 VIAL (ML) INTRAVENOUS ONCE
Refills: 0 | Status: COMPLETED | OUTPATIENT
Start: 2019-07-10 | End: 2019-07-10

## 2019-07-10 RX ADMIN — INSULIN HUMAN 3 UNIT(S): 100 INJECTION, SOLUTION SUBCUTANEOUS at 23:57

## 2019-07-10 RX ADMIN — Medication 162 MILLIGRAM(S): at 22:22

## 2019-07-10 NOTE — ED PROVIDER NOTE - OBJECTIVE STATEMENT
63F c hx CAD (Cath Feb '19 showing 99% RCA, 100% RPLS, 100% Cx) s/p multiple stents/brachytherapy, ESRD (on HD M/T/T/Sa), DM1 c/b neuropathy and retinopathy, CHF, mod MR, severe AS, RHF, TIA, severe PVD s/p b/l fempop bypass, left subclavian vein stenosis s/p stent, COPD not on O2, presenting with chest pain x 1 hour started while watching TV suddenly substernal non radiating feels similar to prior cardiac events; pain is constant and present in the room. Also endorses worsening leg swelling x several days and leg pain. Denies recent f/c n/v/d, f/c, palpitations.    No recent missed HD, has been taking medications as prescribed, 63F c hx CAD (Cath  showing 99% RCA, 100% RPLS, 100% Cx) s/p multiple stents/brachytherapy, ESRD (on HD M/T/T/), DM1 c/b neuropathy and retinopathy, CHF, mod MR, severe AS, RHF, TIA, severe PVD s/p b/l fempop bypass, left subclavian vein stenosis s/p stent, COPD not on O2, presenting with chest pain x 1 hour started while watching TV suddenly substernal non radiating feels similar to prior cardiac events; pain is constant and present in the room. Also endorses worsening leg swelling x several days and leg pain. Denies recent f/c n/v/d, f/c, palpitations.    No recent missed HD, has been taking medications as prescribed,    Attendinyo female presents with chest pain.  states she had 'heart pain' when she was eating just prior to arrival.  no fever.  no chills.  feels better now.

## 2019-07-10 NOTE — ED PROVIDER NOTE - CLINICAL SUMMARY MEDICAL DECISION MAKING FREE TEXT BOX
Pt p/w chest pain similar to prior cardiac events; given extensive cardiovascular disease will likely require admission for further w/u; no signiificant cough/fever making infectious etiology less likely.  Plan: Labs ASA XR EKG

## 2019-07-11 DIAGNOSIS — E10.65 TYPE 1 DIABETES MELLITUS WITH HYPERGLYCEMIA: ICD-10-CM

## 2019-07-11 DIAGNOSIS — I10 ESSENTIAL (PRIMARY) HYPERTENSION: ICD-10-CM

## 2019-07-11 DIAGNOSIS — E78.49 OTHER HYPERLIPIDEMIA: ICD-10-CM

## 2019-07-11 DIAGNOSIS — R07.9 CHEST PAIN, UNSPECIFIED: ICD-10-CM

## 2019-07-11 LAB
ALBUMIN SERPL ELPH-MCNC: 3.8 G/DL — SIGNIFICANT CHANGE UP (ref 3.3–5)
ALP SERPL-CCNC: 64 U/L — SIGNIFICANT CHANGE UP (ref 40–120)
ALT FLD-CCNC: 17 U/L — SIGNIFICANT CHANGE UP (ref 10–45)
ANION GAP SERPL CALC-SCNC: 19 MMOL/L — HIGH (ref 5–17)
AST SERPL-CCNC: 26 U/L — SIGNIFICANT CHANGE UP (ref 10–40)
BILIRUB SERPL-MCNC: 0.3 MG/DL — SIGNIFICANT CHANGE UP (ref 0.2–1.2)
BUN SERPL-MCNC: 50 MG/DL — HIGH (ref 7–23)
CALCIUM SERPL-MCNC: 9.3 MG/DL — SIGNIFICANT CHANGE UP (ref 8.4–10.5)
CHLORIDE SERPL-SCNC: 92 MMOL/L — LOW (ref 96–108)
CK MB BLD-MCNC: 2.3 % — SIGNIFICANT CHANGE UP (ref 0–3.5)
CK MB CFR SERPL CALC: 6.9 NG/ML — HIGH (ref 0–3.8)
CK SERPL-CCNC: 302 U/L — HIGH (ref 25–170)
CO2 SERPL-SCNC: 24 MMOL/L — SIGNIFICANT CHANGE UP (ref 22–31)
CREAT SERPL-MCNC: 5.71 MG/DL — HIGH (ref 0.5–1.3)
GLUCOSE BLDC GLUCOMTR-MCNC: 164 MG/DL — HIGH (ref 70–99)
GLUCOSE BLDC GLUCOMTR-MCNC: 181 MG/DL — HIGH (ref 70–99)
GLUCOSE BLDC GLUCOMTR-MCNC: 250 MG/DL — HIGH (ref 70–99)
GLUCOSE BLDC GLUCOMTR-MCNC: 251 MG/DL — HIGH (ref 70–99)
GLUCOSE BLDC GLUCOMTR-MCNC: 262 MG/DL — HIGH (ref 70–99)
GLUCOSE SERPL-MCNC: 282 MG/DL — HIGH (ref 70–99)
HCT VFR BLD CALC: 31.9 % — LOW (ref 34.5–45)
HGB BLD-MCNC: 9.9 G/DL — LOW (ref 11.5–15.5)
MAGNESIUM SERPL-MCNC: 2.6 MG/DL — SIGNIFICANT CHANGE UP (ref 1.6–2.6)
MCHC RBC-ENTMCNC: 31 GM/DL — LOW (ref 32–36)
MCHC RBC-ENTMCNC: 33.4 PG — SIGNIFICANT CHANGE UP (ref 27–34)
MCV RBC AUTO: 107.8 FL — HIGH (ref 80–100)
PLATELET # BLD AUTO: 220 K/UL — SIGNIFICANT CHANGE UP (ref 150–400)
POTASSIUM SERPL-MCNC: 5.4 MMOL/L — HIGH (ref 3.5–5.3)
POTASSIUM SERPL-SCNC: 5.4 MMOL/L — HIGH (ref 3.5–5.3)
PROT SERPL-MCNC: 6.4 G/DL — SIGNIFICANT CHANGE UP (ref 6–8.3)
RBC # BLD: 2.96 M/UL — LOW (ref 3.8–5.2)
RBC # FLD: 13.8 % — SIGNIFICANT CHANGE UP (ref 10.3–14.5)
SODIUM SERPL-SCNC: 135 MMOL/L — SIGNIFICANT CHANGE UP (ref 135–145)
TROPONIN T, HIGH SENSITIVITY RESULT: 184 NG/L — HIGH (ref 0–51)
TROPONIN T, HIGH SENSITIVITY RESULT: 188 NG/L — HIGH (ref 0–51)
TROPONIN T, HIGH SENSITIVITY RESULT: 190 NG/L — HIGH (ref 0–51)
WBC # BLD: 4.64 K/UL — SIGNIFICANT CHANGE UP (ref 3.8–10.5)
WBC # FLD AUTO: 4.64 K/UL — SIGNIFICANT CHANGE UP (ref 3.8–10.5)

## 2019-07-11 PROCEDURE — 99223 1ST HOSP IP/OBS HIGH 75: CPT

## 2019-07-11 RX ORDER — SENNA PLUS 8.6 MG/1
2 TABLET ORAL AT BEDTIME
Refills: 0 | Status: DISCONTINUED | OUTPATIENT
Start: 2019-07-11 | End: 2019-07-15

## 2019-07-11 RX ORDER — HYDRALAZINE HCL 50 MG
25 TABLET ORAL THREE TIMES A DAY
Refills: 0 | Status: DISCONTINUED | OUTPATIENT
Start: 2019-07-11 | End: 2019-07-15

## 2019-07-11 RX ORDER — DEXTROSE 50 % IN WATER 50 %
12.5 SYRINGE (ML) INTRAVENOUS ONCE
Refills: 0 | Status: DISCONTINUED | OUTPATIENT
Start: 2019-07-11 | End: 2019-07-15

## 2019-07-11 RX ORDER — ATORVASTATIN CALCIUM 80 MG/1
20 TABLET, FILM COATED ORAL AT BEDTIME
Refills: 0 | Status: DISCONTINUED | OUTPATIENT
Start: 2019-07-11 | End: 2019-07-15

## 2019-07-11 RX ORDER — INSULIN LISPRO 100/ML
2 VIAL (ML) SUBCUTANEOUS AT BEDTIME
Refills: 0 | Status: DISCONTINUED | OUTPATIENT
Start: 2019-07-11 | End: 2019-07-14

## 2019-07-11 RX ORDER — CLOPIDOGREL BISULFATE 75 MG/1
75 TABLET, FILM COATED ORAL DAILY
Refills: 0 | Status: DISCONTINUED | OUTPATIENT
Start: 2019-07-11 | End: 2019-07-15

## 2019-07-11 RX ORDER — INSULIN LISPRO 100/ML
3 VIAL (ML) SUBCUTANEOUS
Refills: 0 | Status: DISCONTINUED | OUTPATIENT
Start: 2019-07-11 | End: 2019-07-14

## 2019-07-11 RX ORDER — DOCUSATE SODIUM 100 MG
100 CAPSULE ORAL THREE TIMES A DAY
Refills: 0 | Status: DISCONTINUED | OUTPATIENT
Start: 2019-07-11 | End: 2019-07-15

## 2019-07-11 RX ORDER — DEXTROSE 50 % IN WATER 50 %
15 SYRINGE (ML) INTRAVENOUS ONCE
Refills: 0 | Status: DISCONTINUED | OUTPATIENT
Start: 2019-07-11 | End: 2019-07-15

## 2019-07-11 RX ORDER — LISINOPRIL 2.5 MG/1
40 TABLET ORAL DAILY
Refills: 0 | Status: DISCONTINUED | OUTPATIENT
Start: 2019-07-11 | End: 2019-07-15

## 2019-07-11 RX ORDER — SODIUM CHLORIDE 9 MG/ML
1000 INJECTION, SOLUTION INTRAVENOUS
Refills: 0 | Status: DISCONTINUED | OUTPATIENT
Start: 2019-07-11 | End: 2019-07-15

## 2019-07-11 RX ORDER — DEXTROSE 50 % IN WATER 50 %
25 SYRINGE (ML) INTRAVENOUS ONCE
Refills: 0 | Status: DISCONTINUED | OUTPATIENT
Start: 2019-07-11 | End: 2019-07-15

## 2019-07-11 RX ORDER — INSULIN LISPRO 100/ML
2 VIAL (ML) SUBCUTANEOUS
Refills: 0 | Status: DISCONTINUED | OUTPATIENT
Start: 2019-07-11 | End: 2019-07-11

## 2019-07-11 RX ORDER — HEPARIN SODIUM 5000 [USP'U]/ML
5000 INJECTION INTRAVENOUS; SUBCUTANEOUS EVERY 12 HOURS
Refills: 0 | Status: DISCONTINUED | OUTPATIENT
Start: 2019-07-11 | End: 2019-07-15

## 2019-07-11 RX ORDER — INSULIN GLARGINE 100 [IU]/ML
6 INJECTION, SOLUTION SUBCUTANEOUS AT BEDTIME
Refills: 0 | Status: DISCONTINUED | OUTPATIENT
Start: 2019-07-11 | End: 2019-07-13

## 2019-07-11 RX ORDER — INSULIN LISPRO 100/ML
VIAL (ML) SUBCUTANEOUS
Refills: 0 | Status: DISCONTINUED | OUTPATIENT
Start: 2019-07-11 | End: 2019-07-15

## 2019-07-11 RX ORDER — INSULIN GLARGINE 100 [IU]/ML
4 INJECTION, SOLUTION SUBCUTANEOUS ONCE
Refills: 0 | Status: COMPLETED | OUTPATIENT
Start: 2019-07-11 | End: 2019-07-11

## 2019-07-11 RX ORDER — OXYCODONE AND ACETAMINOPHEN 5; 325 MG/1; MG/1
1 TABLET ORAL EVERY 6 HOURS
Refills: 0 | Status: DISCONTINUED | OUTPATIENT
Start: 2019-07-11 | End: 2019-07-15

## 2019-07-11 RX ORDER — GLUCAGON INJECTION, SOLUTION 0.5 MG/.1ML
1 INJECTION, SOLUTION SUBCUTANEOUS ONCE
Refills: 0 | Status: DISCONTINUED | OUTPATIENT
Start: 2019-07-11 | End: 2019-07-15

## 2019-07-11 RX ORDER — INSULIN GLARGINE 100 [IU]/ML
4 INJECTION, SOLUTION SUBCUTANEOUS AT BEDTIME
Refills: 0 | Status: DISCONTINUED | OUTPATIENT
Start: 2019-07-11 | End: 2019-07-11

## 2019-07-11 RX ORDER — INSULIN LISPRO 100/ML
VIAL (ML) SUBCUTANEOUS AT BEDTIME
Refills: 0 | Status: DISCONTINUED | OUTPATIENT
Start: 2019-07-11 | End: 2019-07-12

## 2019-07-11 RX ORDER — IPRATROPIUM/ALBUTEROL SULFATE 18-103MCG
3 AEROSOL WITH ADAPTER (GRAM) INHALATION EVERY 6 HOURS
Refills: 0 | Status: DISCONTINUED | OUTPATIENT
Start: 2019-07-11 | End: 2019-07-15

## 2019-07-11 RX ORDER — METOPROLOL TARTRATE 50 MG
50 TABLET ORAL DAILY
Refills: 0 | Status: DISCONTINUED | OUTPATIENT
Start: 2019-07-11 | End: 2019-07-15

## 2019-07-11 RX ORDER — SEVELAMER CARBONATE 2400 MG/1
800 POWDER, FOR SUSPENSION ORAL
Refills: 0 | Status: DISCONTINUED | OUTPATIENT
Start: 2019-07-11 | End: 2019-07-15

## 2019-07-11 RX ORDER — ASPIRIN/CALCIUM CARB/MAGNESIUM 324 MG
81 TABLET ORAL DAILY
Refills: 0 | Status: DISCONTINUED | OUTPATIENT
Start: 2019-07-11 | End: 2019-07-15

## 2019-07-11 RX ADMIN — Medication 81 MILLIGRAM(S): at 10:46

## 2019-07-11 RX ADMIN — Medication 1 TABLET(S): at 10:46

## 2019-07-11 RX ADMIN — ATORVASTATIN CALCIUM 20 MILLIGRAM(S): 80 TABLET, FILM COATED ORAL at 22:08

## 2019-07-11 RX ADMIN — SEVELAMER CARBONATE 800 MILLIGRAM(S): 2400 POWDER, FOR SUSPENSION ORAL at 18:13

## 2019-07-11 RX ADMIN — OXYCODONE AND ACETAMINOPHEN 1 TABLET(S): 5; 325 TABLET ORAL at 19:00

## 2019-07-11 RX ADMIN — Medication 50 MILLIGRAM(S): at 10:46

## 2019-07-11 RX ADMIN — INSULIN GLARGINE 4 UNIT(S): 100 INJECTION, SOLUTION SUBCUTANEOUS at 02:30

## 2019-07-11 RX ADMIN — Medication 3 MILLILITER(S): at 18:13

## 2019-07-11 RX ADMIN — OXYCODONE AND ACETAMINOPHEN 1 TABLET(S): 5; 325 TABLET ORAL at 23:58

## 2019-07-11 RX ADMIN — Medication 3 UNIT(S): at 18:12

## 2019-07-11 RX ADMIN — Medication 2 UNIT(S): at 22:07

## 2019-07-11 RX ADMIN — OXYCODONE AND ACETAMINOPHEN 1 TABLET(S): 5; 325 TABLET ORAL at 18:55

## 2019-07-11 RX ADMIN — Medication 3 UNIT(S): at 13:30

## 2019-07-11 RX ADMIN — OXYCODONE AND ACETAMINOPHEN 1 TABLET(S): 5; 325 TABLET ORAL at 11:18

## 2019-07-11 RX ADMIN — Medication 1: at 18:13

## 2019-07-11 RX ADMIN — Medication 3 UNIT(S): at 10:35

## 2019-07-11 RX ADMIN — Medication 3 MILLILITER(S): at 11:18

## 2019-07-11 RX ADMIN — Medication 100 MILLIGRAM(S): at 22:08

## 2019-07-11 RX ADMIN — CLOPIDOGREL BISULFATE 75 MILLIGRAM(S): 75 TABLET, FILM COATED ORAL at 10:46

## 2019-07-11 RX ADMIN — Medication 1: at 13:30

## 2019-07-11 RX ADMIN — INSULIN GLARGINE 6 UNIT(S): 100 INJECTION, SOLUTION SUBCUTANEOUS at 22:06

## 2019-07-11 RX ADMIN — Medication 2: at 10:35

## 2019-07-11 RX ADMIN — Medication 200 GRAM(S): at 00:35

## 2019-07-11 RX ADMIN — Medication 2: at 22:07

## 2019-07-11 RX ADMIN — Medication 3 MILLILITER(S): at 23:18

## 2019-07-11 RX ADMIN — OXYCODONE AND ACETAMINOPHEN 1 TABLET(S): 5; 325 TABLET ORAL at 11:48

## 2019-07-11 RX ADMIN — LISINOPRIL 40 MILLIGRAM(S): 2.5 TABLET ORAL at 10:46

## 2019-07-11 RX ADMIN — Medication 25 MILLIGRAM(S): at 22:08

## 2019-07-11 NOTE — CONSULT NOTE ADULT - ASSESSMENT
64yo F h/o DM1 w frequent hospitalizations for DKA, ESRD on HD ([facility] AVF, M/T/Th/Sa, last HD currently), CAD s/p  stents, HTN HLD, CVA, PVD presents from home w/ Chest pain, LE pain and swelling with hyperglycemia

## 2019-07-11 NOTE — H&P ADULT - HISTORY OF PRESENT ILLNESS
63F c hx CAD (Cath Feb '19 showing 99% RCA, 100% RPLS, 100% Cx) s/p multiple stents/brachytherapy, ESRD (on HD M/T/T/Sa), DM1 c/b neuropathy and retinopathy, CHF, mod MR, severe AS, RHF, TIA, severe PVD s/p b/l fempop bypass, left subclavian vein stenosis s/p stent, COPD not on O2, presenting with chest pain x 1 hour started while watching TV suddenly substernal non radiating feels similar to prior cardiac events; pain is constant and present in the room. Also endorses worsening leg swelling x several days and leg pain. Denies recent f/c n/v/d, f/c, palpitations.    	No recent missed HD, has been taking medications as prescribed,

## 2019-07-11 NOTE — CONSULT NOTE ADULT - SUBJECTIVE AND OBJECTIVE BOX
HPI:  63 y.o. female with h/o uncontrolled Type 1 DM (diagnosed at age 20) c/b neuropathy, retinopathy, ESRD on HD with CAD s/p PCI, TIA, CHF and lung lesion admitted for chest pain, hyperglycemia and LE edema. Patient had cardiac cath in Feb 2019 with no intervention. Patient follows with endocrinologist Dr Ley. Patient takes Lantus 6 U at night and Humalog 3-5U before meals. States glucose values are extremely variable with hyper and hypoglycemia without a pattern. [facility] Is UTD with opthalmology and goes every 3 months. On HD 4 days per week. No podiatry. States she tries to moderate her intake of carbs. Patient used to be on an insulin pump in the past but was not able to manage it appropriately.     Also hx CAD (Cath Feb '19 showing 99% RCA, 100% RPLS, 100% Cx) s/p multiple stents/brachytherapy, ESRD (on HD M/T/T/Sa), CHF, mod MR, severe AS, RHF, TIA, severe PVD s/p b/l fempop bypass, left subclavian vein stenosis s/p stent, COPD not on O2, presenting with chest pain x 1 hour started while watching TV suddenly substernal non radiating feels similar to prior cardiac events; pain is constant and present in the room. Also endorses worsening leg swelling x several days and leg pain. Denies recent f/c n/v/d, f/c, palpitations.        PAST MEDICAL & SURGICAL HISTORY:  COPD (chronic obstructive pulmonary disease)  Localized enlarged lymph nodes  CHF (congestive heart failure): EF 40-45%  Subclavian vein stenosis, left: s/p stent  DKA, type 1: 1/2015  ACS (acute coronary syndrome): 1/2015 - cath revealed 100% ostial stenosis not amenable to PCI - medical management  TIA (transient ischemic attack): x 2 - 8-9 years ago prior to ASD/VSD repair  CAD (coronary artery disease): s/p stents  Gout: past  CVA (cerebral infarction): with no residual, 8 yrs ago, prior to heart surgery - ST memory loss  Peripheral vascular disease: occluded left fem-pop bypass 5/2015  Diabetes mellitus type 1: Insulin Dependent -  ESRD (end stage renal disease): dialysis  M, tue, thursday, saturday  Hyperlipidemia  Status post device closure of ASD: &quot;clamshell&quot;  History of cardiac catheterization: 1/2015 - no intervention  S/P femoral-popliteal bypass surgery: L and R in 2013 with graft; 5/2015 CFA angioplasty left and ileofemoral endarterectomywith vein patch angioplasty of left fem-pop bypass graft  Multiple vascular surgery both leg, left fempop bypass revision 11/2015  AV (arteriovenous fistula): Left AV graft; revision with stent placement 2-3 years ago  S/P cholecystectomy      FAMILY HISTORY:  Family history of smoking  Family history of hypertension  Family history of cancer (Sibling)      Social History: Former smoker, quit smoking 16yrs ago. No alcohol use.       Outpatient Medications:  · 	senna oral tablet: 2 tab(s) orally once a day (at bedtime), As needed, Constipation  · 	docusate sodium 100 mg oral capsule: 1 cap(s) orally 3 times a day  · 	Multiple Vitamins oral tablet: 1 tab(s) orally once a day  · 	hydrALAZINE 25 mg oral tablet: 1 tab(s) orally 3 times a day  · 	sevelamer carbonate 800 mg oral tablet: 2 tab(s) orally 3 times a day (with meals)  · 	metoprolol succinate 50 mg oral tablet, extended release: 1 tab(s) orally once a day  · 	clopidogrel 75 mg oral tablet: 1 tab(s) orally once a day  · 	insulin lispro 100 units/mL injectable solution: 3 unit(s) injectable 3 times a day  · 	lisinopril 40 mg oral tablet: 1 tab(s) orally once a day  · 	aspirin 81 mg oral delayed release tablet: 1 tab(s) orally once a day  · 	Percocet 5/325 oral tablet: 2 tab(s) orally once a day (at bedtime), As Needed  · 	atorvastatin 20 mg oral tablet: 1 tab(s) orally once a day  · 	Lantus Solostar Pen 100 units/mL subcutaneous solution: 6 unit(s) subcutaneous once a day (at bedtime)   	IF FASTING BLOOD SUGAR BELOW 200 DECREASE LANTUS TO 4 units    MEDICATIONS  (STANDING):  ALBUTerol/ipratropium for Nebulization 3 milliLiter(s) Nebulizer every 6 hours  aspirin enteric coated 81 milliGRAM(s) Oral daily  atorvastatin 20 milliGRAM(s) Oral at bedtime  clopidogrel Tablet 75 milliGRAM(s) Oral daily  dextrose 5%. 1000 milliLiter(s) (50 mL/Hr) IV Continuous <Continuous>  dextrose 50% Injectable 12.5 Gram(s) IV Push once  dextrose 50% Injectable 25 Gram(s) IV Push once  dextrose 50% Injectable 25 Gram(s) IV Push once  docusate sodium 100 milliGRAM(s) Oral three times a day  heparin  Injectable 5000 Unit(s) SubCutaneous every 12 hours  hydrALAZINE 25 milliGRAM(s) Oral three times a day  insulin glargine Injectable (LANTUS) 4 Unit(s) SubCutaneous at bedtime  insulin lispro (HumaLOG) corrective regimen sliding scale   SubCutaneous three times a day before meals  insulin lispro (HumaLOG) corrective regimen sliding scale   SubCutaneous at bedtime  insulin lispro Injectable (HumaLOG) 3 Unit(s) SubCutaneous three times a day before meals  lisinopril 40 milliGRAM(s) Oral daily  metoprolol succinate ER 50 milliGRAM(s) Oral daily  multivitamin 1 Tablet(s) Oral daily  sevelamer carbonate 800 milliGRAM(s) Oral three times a day with meals    MEDICATIONS  (PRN):  dextrose 40% Gel 15 Gram(s) Oral once PRN Blood Glucose LESS THAN 70 milliGRAM(s)/deciliter  glucagon  Injectable 1 milliGRAM(s) IntraMuscular once PRN Glucose LESS THAN 70 milligrams/deciliter  oxyCODONE    5 mG/acetaminophen 325 mG 1 Tablet(s) Oral every 6 hours PRN Moderate Pain (4 - 6)  senna 2 Tablet(s) Oral at bedtime PRN Constipation      Allergies    No Known Allergies    Intolerances      Review of Systems:  Constitutional: No fever, No change in weight, + fatigue  Eyes: +retinopathy  Neuro: No headache, +neuropathy  HEENT: No throat pain  Cardiovascular: + chest pain  Respiratory: + SOB  GI: No nausea or vomiting  : No polyuria  Skin: no rash  Psych: no depression  Endocrine: No polydipsia, +cold intolerance, rest as noted in HPI  Hem/lymph: + LE swelling    All other review of systems negative      PHYSICAL EXAM:  VITALS: T(C): 36.7 (07-11-19 @ 16:45)  T(F): 98 (07-11-19 @ 16:45), Max: 98.6 (07-10-19 @ 21:31)  HR: 71 (07-11-19 @ 16:45) (71 - 106)  BP: 164/85 (07-11-19 @ 16:45) (131/66 - 171/72)  RR:  (16 - 20)  SpO2:  (97% - 100%)  Wt(kg): --  GENERAL: NAD at this time on HD at this time.   EYES: No proptosis, EOMI  HEENT:  Atraumatic, Normocephalic,   THYROID: Normal size, no palpable nodules  RESPIRATORY: full excursion, non-labored  CARDIOVASCULAR: Regular rhythm; No murmurs; + peripheral edema  GI: Soft, nontender, non distended, normal bowel sounds  SKIN: Dry, intact  MUSCULOSKELETAL: full ROM of upper extremities  NEURO: follows commands  PSYCH: normal affect  CUSHING'S SIGNS: no striae      POCT Blood Glucose.: 164 mg/dL (07-11-19 @ 17:49)  POCT Blood Glucose.: 181 mg/dL (07-11-19 @ 13:18)  POCT Blood Glucose.: 250 mg/dL (07-11-19 @ 10:05)  POCT Blood Glucose.: 251 mg/dL (07-11-19 @ 02:27)  POCT Blood Glucose.: 219 mg/dL (07-10-19 @ 23:54)                              9.9    4.64  )-----------( 220      ( 11 Jul 2019 09:41 )             31.9       07-11    135  |  92<L>  |  50<H>  ----------------------------<  282<H>  5.4<H>   |  24  |  5.71<H>    EGFR if : 9<L>  EGFR if non : 7<L>    Ca    9.3      07-11  Mg     2.6     07-11    TPro  6.4  /  Alb  3.8  /  TBili  0.3  /  DBili  x   /  AST  26  /  ALT  17  /  AlkPhos  64  07-11    Thyroid Function Tests:      Hemoglobin A1C, Whole Blood: 8.2 % <H> [4.0 - 5.6] (06-16-19 @ 13:24)  Hemoglobin A1C, Whole Blood: 8.8 % <H> [4.0 - 5.6] (04-30-19 @ 08:58)        Radiology:

## 2019-07-11 NOTE — H&P ADULT - ASSESSMENT
63 f with    Chest pain/ CAD  - telemetry  - cardiac enzymes  - continue Rx  - cardiology evaluation Dr. Biswas    ESRD  - HD  - Nephrology evaluation Dr. Schmidt    Diabetes mellitus type 1  - BS control  -Endocrine evaluation called    COPD  - nebs    HTN control  Further action as per clinical course   Pierce Viera MD pager [phone]

## 2019-07-11 NOTE — H&P ADULT - NSHPREVIEWOFSYSTEMS_GEN_ALL_CORE
REVIEW OF SYSTEMS:    CONSTITUTIONAL: No weakness, fevers or chills  EYES/ENT: No visual changes;  No vertigo or throat pain   NECK: No pain or stiffness  RESPIRATORY: No cough, wheezing, hemoptysis; No shortness of breath  CARDIOVASCULAR: + chest pain no palpitations  GASTROINTESTINAL: No abdominal or epigastric pain. No nausea, vomiting, or hematemesis; No diarrhea or constipation. No melena or hematochezia.  GENITOURINARY: No dysuria, frequency or hematuria  NEUROLOGICAL: No numbness or weakness  SKIN: No itching, burning, rashes, or lesions   All other review of systems is negative unless indicated above.

## 2019-07-11 NOTE — H&P ADULT - NSHPPHYSICALEXAM_GEN_ALL_CORE
PHYSICAL EXAMINATION:  Vital Signs Last 24 Hrs  T(C): 36.7 (11 Jul 2019 05:30), Max: 37 (10 Jul 2019 21:31)  T(F): 98 (11 Jul 2019 05:30), Max: 98.6 (10 Jul 2019 21:31)  HR: 74 (11 Jul 2019 05:30) (74 - 85)  BP: 155/75 (11 Jul 2019 05:30) (145/70 - 155/75)  BP(mean): --  RR: 18 (11 Jul 2019 05:30) (18 - 20)  SpO2: 97% (11 Jul 2019 05:30) (97% - 100%)  CAPILLARY BLOOD GLUCOSE      POCT Blood Glucose.: 251 mg/dL (11 Jul 2019 02:27)  POCT Blood Glucose.: 219 mg/dL (10 Jul 2019 23:54)      GENERAL: NAD, well-groomed, well-developed  HEAD:  atraumatic, normocephalic  EYES: sclera anicteric  ENMT: mucous membranes moist  NECK: supple, No JVD  CHEST/LUNG: clear to auscultation bilaterally; no rales, rhonchi, or wheezing b/l  HEART: normal S1, S2  ABDOMEN: BS+, soft, ND, NT   EXTREMITIES: 2+ edema b/l LEs  NEURO: awake, alert, interactive; moves all extremities  SKIN: no rashes or lesions

## 2019-07-11 NOTE — CONSULT NOTE ADULT - ASSESSMENT
63F c hx CAD (Cath Feb '19 showing 99% RCA, 100% RPLS, 100% Cx) s/p multiple stents/brachytherapy, ESRD (on HD M/T/T/Sa), DM1 c/b neuropathy and retinopathy, CHF, mod MR, severe AS, RHF, TIA, severe PVD s/p b/l fempop bypass, left subclavian vein stenosis s/p stent, COPD not on O2, presenting with chest pain     1- esrd  2- hyperkalemia  3- chf  4- Dm brittle  5- htn   6- cad/cp       Given hyperkalemia is due to end-stage renal disease and patient is due for dialysis.  Dialysis.  Consent Obtained witnessed placed in chart.  HD.  Arrangements discussed with HD RN.  Low potassium diet.  Continue with lisinopril as above.  I suspect patient's been noncompliant for increase fluid gain.  Her blood pressure has not been low  in the past With hd in hosp.    increase fluid removal with hd given pt anuric   cardiology consult

## 2019-07-11 NOTE — CONSULT NOTE ADULT - SUBJECTIVE AND OBJECTIVE BOX
[facility]  [phone]  NEPHROLOGY      INITIAL CONSULT NOTE  --------------------------------------------------------------------------------  HPI:    63F c hx CAD (Cath Feb '19 showing 99% RCA, 100% RPLS, 100% Cx) s/p multiple stents/brachytherapy, ESRD (on HD M/T/T/Sa), DM1 c/b neuropathy and retinopathy, CHF, mod MR, severe AS, RHF, TIA, severe PVD s/p b/l fempop bypass, left subclavian vein stenosis s/p stent, COPD not on O2, presenting with chest pain x 1 hour started while watching TV suddenly substernal non radiating feels similar to prior cardiac events; Also endorses worsening leg swelling x several days and leg pain. presented to er and noticed with hyperkalemia. renal consult called.     PAST HISTORY  --------------------------------------------------------------------------------  PAST MEDICAL & SURGICAL HISTORY:  COPD (chronic obstructive pulmonary disease)  Localized enlarged lymph nodes  CHF (congestive heart failure): EF 40-45%  Subclavian vein stenosis, left: s/p stent  DKA, type 1: 1/2015  ACS (acute coronary syndrome): 1/2015 - cath revealed 100% ostial stenosis not amenable to PCI - medical management  TIA (transient ischemic attack): x 2 - 8-9 years ago prior to ASD/VSD repair  CAD (coronary artery disease): s/p stents  Gout: past  CVA (cerebral infarction): with no residual, 8 yrs ago, prior to heart surgery - ST memory loss  Peripheral vascular disease: occluded left fem-pop bypass 5/2015  Diabetes mellitus type 1: Insulin Dependent -  ESRD (end stage renal disease): dialysis  M, tue, thursday, saturday  Hyperlipidemia  Status post device closure of ASD: &quot;clamshell&quot;  History of cardiac catheterization: 1/2015 - no intervention  S/P femoral-popliteal bypass surgery: L and R in 2013 with graft; 5/2015 CFA angioplasty left and ileofemoral endarterectomy with vein patch angioplasty of left fem-pop bypass graft  Multiple vascular surgery both leg, left fempop bypass revision 11/2015  AV (arteriovenous fistula): Left AV graft; revision with stent placement 2-3 years ago  S/P cholecystectomy    FAMILY HISTORY:  Family history of smoking  Family history of hypertension  Family history of cancer (Sibling)    PAST SOCIAL HISTORY: past tobacco use     ALLERGIES & MEDICATIONS  --------------------------------------------------------------------------------  Allergies    No Known Allergies    Intolerances      Standing Inpatient Medications  ALBUTerol/ipratropium for Nebulization 3 milliLiter(s) Nebulizer every 6 hours  aspirin enteric coated 81 milliGRAM(s) Oral daily  atorvastatin 20 milliGRAM(s) Oral at bedtime  clopidogrel Tablet 75 milliGRAM(s) Oral daily  dextrose 5%. 1000 milliLiter(s) IV Continuous <Continuous>  dextrose 50% Injectable 12.5 Gram(s) IV Push once  dextrose 50% Injectable 25 Gram(s) IV Push once  dextrose 50% Injectable 25 Gram(s) IV Push once  docusate sodium 100 milliGRAM(s) Oral three times a day  heparin  Injectable 5000 Unit(s) SubCutaneous every 12 hours  hydrALAZINE 25 milliGRAM(s) Oral three times a day  insulin glargine Injectable (LANTUS) 4 Unit(s) SubCutaneous at bedtime  insulin lispro (HumaLOG) corrective regimen sliding scale   SubCutaneous three times a day before meals  insulin lispro (HumaLOG) corrective regimen sliding scale   SubCutaneous at bedtime  insulin lispro Injectable (HumaLOG) 3 Unit(s) SubCutaneous three times a day before meals  insulin lispro Injectable (HumaLOG) 2 Unit(s) SubCutaneous three times a day before meals  lisinopril 40 milliGRAM(s) Oral daily  metoprolol succinate ER 50 milliGRAM(s) Oral daily  multivitamin 1 Tablet(s) Oral daily  sevelamer carbonate 800 milliGRAM(s) Oral three times a day with meals    PRN Inpatient Medications  dextrose 40% Gel 15 Gram(s) Oral once PRN  glucagon  Injectable 1 milliGRAM(s) IntraMuscular once PRN  oxyCODONE    5 mG/acetaminophen 325 mG 1 Tablet(s) Oral every 6 hours PRN  senna 2 Tablet(s) Oral at bedtime PRN      REVIEW OF SYSTEMS  --------------------------------------------------------------------------------  Gen: No  fevers/chills   Skin: No rashes  Head/Eyes/Ears/Mouth: No headache; Normal hearing;  No sinus pain/discomfort, sore throat  Respiratory: + dyspnea, - cough,  - wheezing, hemoptysis -   CV:   Cp dissipated +  orthopnea  GI: No abdominal pain, diarrhea, nausea, vomiting  : No dysuria, hematuria,His socks  MSK: No joint pain/swelling; no back pain  Neuro: No dizziness/lightheadedness,    also with + Raynaud's.  I cleaned edema     All other systems were reviewed and are negative, except as noted.    VITALS/PHYSICAL EXAM  --------------------------------------------------------------------------------  T(C): 36.3 (07-11-19 @ 12:30), Max: 37 (07-10-19 @ 21:31)  HR: 78 (07-11-19 @ 12:30) (74 - 106)  BP: 171/72 (07-11-19 @ 12:30) (131/66 - 171/72)  RR: 18 (07-11-19 @ 12:30) (16 - 20)  SpO2: 100% (07-11-19 @ 12:30) (97% - 100%)  Wt(kg): --  Height (cm): 162.56 (07-10-19 @ 21:31)  Weight (kg): 50.8 (07-10-19 @ 21:31)  BMI (kg/m2): 19.2 (07-10-19 @ 21:31)  BSA (m2): 1.53 (07-10-19 @ 21:31)      Physical Exam:  	Gen: chronic ill appearing   	no jvd ,  	Pulm: decrease bs  no rales or ronchi or wheezing  	CV: RRR, S1S2; no rub  	Back: No CVA tenderness; no sacral edema  	Abd: +BS, soft, nontender/nondistended  	: No suprapubic tenderness  	UE: Warm, no cyanosis  no clubbing,  no edema; no asterixis  	LE: Warm, no cyanosis  no clubbing, 2+  edema  	Neuro: alert and oriented. speech coherent   	Psych: Normal affect and mood  	Skin: Warm, no decrease skin turgor   	Vascular access: + avf + bruit and thrill     LABS/STUDIES  --------------------------------------------------------------------------------              9.9    4.64  >-----------<  220      [07-11-19 @ 09:41]              31.9     135  |  92  |  50  ----------------------------<  282      [07-11-19 @ 07:12]  5.4   |  24  |  5.71        Ca     9.3     [07-11-19 @ 07:12]      Mg     2.6     [07-11-19 @ 07:12]    TPro  6.4  /  Alb  3.8  /  TBili  0.3  /  DBili  x   /  AST  26  /  ALT  17  /  AlkPhos  64  [07-11-19 @ 07:12]    PT/INR: PT 11.2 , INR 0.97       [07-10-19 @ 22:21]  PTT: 30.5       [07-10-19 @ 22:21]          [07-11-19 @ 10:56]    Creatinine Trend:  SCr 5.71 [07-11 @ 07:12]  SCr 5.07 [07-10 @ 22:21]  SCr 5.65 [07-01 @ 22:39]  SCr 2.95 [06-21 @ 05:37]  SCr 3.49 [06-20 @ 03:59]    Urinalysis - [06-04-17 @ 08:24]      Color Yellow / Appearance Clear / SG 1.013 / pH 8.5      Gluc 1000 / Ketone Negative  / Bili Negative / Urobili Negative       Blood Negative / Protein >600 / Leuk Est Small / Nitrite Negative      RBC 3-5 / WBC 6-10 / Hyaline  / Gran  / Sq Epi  / Non Sq Epi OCC / Bacteria       Iron 42, TIBC 253, %sat 17      [06-17-19 @ 17:54]  Ferritin 1838      [06-17-19 @ 18:06]  PTH -- (Ca 9.6)      [06-17-19 @ 18:06]   177  PTH -- (Ca 7.8)      [03-29-19 @ 20:38]   73  PTH -- (Ca 7.3)      [02-22-19 @ 02:49]   59  HbA1c 8.2      [06-16-19 @ 13:24]  TSH 0.71      [11-17-18 @ 08:25]    HBsAb <3.0      [06-02-19 @ 00:32]  HBsAb Nonreact      [03-26-19 @ 16:04]  HBsAg Nonreact      [06-02-19 @ 00:32]  HBcAb Nonreact      [03-26-19 @ 16:04]  HCV 0.12, Nonreact      [06-02-19 @ 00:32]

## 2019-07-12 LAB
ANION GAP SERPL CALC-SCNC: 16 MMOL/L — SIGNIFICANT CHANGE UP (ref 5–17)
BUN SERPL-MCNC: 31 MG/DL — HIGH (ref 7–23)
CALCIUM SERPL-MCNC: 9.7 MG/DL — SIGNIFICANT CHANGE UP (ref 8.4–10.5)
CHLORIDE SERPL-SCNC: 92 MMOL/L — LOW (ref 96–108)
CO2 SERPL-SCNC: 26 MMOL/L — SIGNIFICANT CHANGE UP (ref 22–31)
CREAT SERPL-MCNC: 4.13 MG/DL — HIGH (ref 0.5–1.3)
GLUCOSE BLDC GLUCOMTR-MCNC: 144 MG/DL — HIGH (ref 70–99)
GLUCOSE BLDC GLUCOMTR-MCNC: 284 MG/DL — HIGH (ref 70–99)
GLUCOSE BLDC GLUCOMTR-MCNC: 308 MG/DL — HIGH (ref 70–99)
GLUCOSE BLDC GLUCOMTR-MCNC: 410 MG/DL — HIGH (ref 70–99)
GLUCOSE BLDC GLUCOMTR-MCNC: 412 MG/DL — HIGH (ref 70–99)
GLUCOSE SERPL-MCNC: 355 MG/DL — HIGH (ref 70–99)
HBV CORE AB SER-ACNC: REACTIVE
HBV SURFACE AB SER-ACNC: <3 MIU/ML — LOW
HBV SURFACE AB SER-ACNC: SIGNIFICANT CHANGE UP
HBV SURFACE AG SER-ACNC: SIGNIFICANT CHANGE UP
HCT VFR BLD CALC: 32.3 % — LOW (ref 34.5–45)
HCV AB S/CO SERPL IA: 0.09 S/CO — SIGNIFICANT CHANGE UP (ref 0–0.99)
HCV AB SERPL-IMP: SIGNIFICANT CHANGE UP
HGB BLD-MCNC: 11.1 G/DL — LOW (ref 11.5–15.5)
MCHC RBC-ENTMCNC: 34.2 GM/DL — SIGNIFICANT CHANGE UP (ref 32–36)
MCHC RBC-ENTMCNC: 36.5 PG — HIGH (ref 27–34)
MCV RBC AUTO: 107 FL — HIGH (ref 80–100)
PLATELET # BLD AUTO: 197 K/UL — SIGNIFICANT CHANGE UP (ref 150–400)
POTASSIUM SERPL-MCNC: 4.8 MMOL/L — SIGNIFICANT CHANGE UP (ref 3.5–5.3)
POTASSIUM SERPL-SCNC: 4.8 MMOL/L — SIGNIFICANT CHANGE UP (ref 3.5–5.3)
RBC # BLD: 3.03 M/UL — LOW (ref 3.8–5.2)
RBC # FLD: 13.1 % — SIGNIFICANT CHANGE UP (ref 10.3–14.5)
SODIUM SERPL-SCNC: 134 MMOL/L — LOW (ref 135–145)
WBC # BLD: 5.9 K/UL — SIGNIFICANT CHANGE UP (ref 3.8–10.5)
WBC # FLD AUTO: 5.9 K/UL — SIGNIFICANT CHANGE UP (ref 3.8–10.5)

## 2019-07-12 PROCEDURE — 99223 1ST HOSP IP/OBS HIGH 75: CPT

## 2019-07-12 RX ORDER — INSULIN LISPRO 100/ML
VIAL (ML) SUBCUTANEOUS AT BEDTIME
Refills: 0 | Status: DISCONTINUED | OUTPATIENT
Start: 2019-07-12 | End: 2019-07-15

## 2019-07-12 RX ORDER — PANTOPRAZOLE SODIUM 20 MG/1
40 TABLET, DELAYED RELEASE ORAL
Refills: 0 | Status: DISCONTINUED | OUTPATIENT
Start: 2019-07-12 | End: 2019-07-15

## 2019-07-12 RX ORDER — INSULIN LISPRO 100/ML
VIAL (ML) SUBCUTANEOUS
Refills: 0 | Status: DISCONTINUED | OUTPATIENT
Start: 2019-07-12 | End: 2019-07-15

## 2019-07-12 RX ADMIN — Medication 6: at 09:23

## 2019-07-12 RX ADMIN — Medication 3 UNIT(S): at 18:07

## 2019-07-12 RX ADMIN — CLOPIDOGREL BISULFATE 75 MILLIGRAM(S): 75 TABLET, FILM COATED ORAL at 09:23

## 2019-07-12 RX ADMIN — OXYCODONE AND ACETAMINOPHEN 1 TABLET(S): 5; 325 TABLET ORAL at 15:30

## 2019-07-12 RX ADMIN — INSULIN GLARGINE 6 UNIT(S): 100 INJECTION, SOLUTION SUBCUTANEOUS at 23:14

## 2019-07-12 RX ADMIN — Medication 81 MILLIGRAM(S): at 09:23

## 2019-07-12 RX ADMIN — OXYCODONE AND ACETAMINOPHEN 1 TABLET(S): 5; 325 TABLET ORAL at 06:12

## 2019-07-12 RX ADMIN — OXYCODONE AND ACETAMINOPHEN 1 TABLET(S): 5; 325 TABLET ORAL at 01:05

## 2019-07-12 RX ADMIN — Medication 1: at 23:14

## 2019-07-12 RX ADMIN — Medication 25 MILLIGRAM(S): at 06:09

## 2019-07-12 RX ADMIN — OXYCODONE AND ACETAMINOPHEN 1 TABLET(S): 5; 325 TABLET ORAL at 14:28

## 2019-07-12 RX ADMIN — Medication 3 MILLILITER(S): at 18:05

## 2019-07-12 RX ADMIN — SEVELAMER CARBONATE 800 MILLIGRAM(S): 2400 POWDER, FOR SUSPENSION ORAL at 13:39

## 2019-07-12 RX ADMIN — ATORVASTATIN CALCIUM 20 MILLIGRAM(S): 80 TABLET, FILM COATED ORAL at 23:12

## 2019-07-12 RX ADMIN — Medication 2 UNIT(S): at 23:14

## 2019-07-12 RX ADMIN — Medication 1 TABLET(S): at 09:23

## 2019-07-12 RX ADMIN — Medication 4: at 13:22

## 2019-07-12 RX ADMIN — Medication 3 UNIT(S): at 13:23

## 2019-07-12 RX ADMIN — Medication 25 MILLIGRAM(S): at 23:11

## 2019-07-12 RX ADMIN — OXYCODONE AND ACETAMINOPHEN 1 TABLET(S): 5; 325 TABLET ORAL at 23:12

## 2019-07-12 RX ADMIN — SEVELAMER CARBONATE 800 MILLIGRAM(S): 2400 POWDER, FOR SUSPENSION ORAL at 09:23

## 2019-07-12 RX ADMIN — Medication 3 MILLILITER(S): at 23:12

## 2019-07-12 RX ADMIN — SEVELAMER CARBONATE 800 MILLIGRAM(S): 2400 POWDER, FOR SUSPENSION ORAL at 18:05

## 2019-07-12 RX ADMIN — Medication 3 MILLILITER(S): at 06:09

## 2019-07-12 RX ADMIN — Medication 100 MILLIGRAM(S): at 06:09

## 2019-07-12 RX ADMIN — LISINOPRIL 40 MILLIGRAM(S): 2.5 TABLET ORAL at 06:12

## 2019-07-12 RX ADMIN — OXYCODONE AND ACETAMINOPHEN 1 TABLET(S): 5; 325 TABLET ORAL at 08:30

## 2019-07-12 RX ADMIN — Medication 100 MILLIGRAM(S): at 23:11

## 2019-07-12 RX ADMIN — Medication 50 MILLIGRAM(S): at 06:09

## 2019-07-12 RX ADMIN — Medication 3 UNIT(S): at 09:22

## 2019-07-12 NOTE — CONSULT NOTE ADULT - SUBJECTIVE AND OBJECTIVE BOX
CHIEF COMPLAINT: cp while eating    HISTORY OF PRESENT ILLNESS:  63F c hx CAD (Cath Feb '19 showing 99% RCA, 100% RPLS, 100% Cx) s/p multiple stents/brachytherapy, ESRD (on HD M/T/T/Sa), DM1 c/b neuropathy and retinopathy, CHF, mod MR, severe AS, RHF, TIA, severe PVD s/p b/l fempop bypass, left subclavian vein stenosis s/p stent, COPD not on O2, presenting with chest pain x 1 hour started while watching TV suddenly substernal non radiating feels similar to prior cardiac events; pain is constant and present in the room. Also endorses worsening leg swelling x several days and leg pain. Denies recent f/c n/v/d, f/c, palpitations.      Allergies  No Known Allergies    MEDICATIONS:  aspirin enteric coated 81 milliGRAM(s) Oral daily  clopidogrel Tablet 75 milliGRAM(s) Oral daily  heparin  Injectable 5000 Unit(s) SubCutaneous every 12 hours  hydrALAZINE 25 milliGRAM(s) Oral three times a day  lisinopril 40 milliGRAM(s) Oral daily  metoprolol succinate ER 50 milliGRAM(s) Oral daily  ALBUTerol/ipratropium for Nebulization 3 milliLiter(s) Nebulizer every 6 hours  oxyCODONE    5 mG/acetaminophen 325 mG 1 Tablet(s) Oral every 6 hours PRN  docusate sodium 100 milliGRAM(s) Oral three times a day  senna 2 Tablet(s) Oral at bedtime PRN  atorvastatin 20 milliGRAM(s) Oral at bedtime  dextrose 40% Gel 15 Gram(s) Oral once PRN  dextrose 50% Injectable 12.5 Gram(s) IV Push once  dextrose 50% Injectable 25 Gram(s) IV Push once  dextrose 50% Injectable 25 Gram(s) IV Push once  glucagon  Injectable 1 milliGRAM(s) IntraMuscular once PRN  insulin glargine Injectable (LANTUS) 6 Unit(s) SubCutaneous at bedtime  insulin lispro (HumaLOG) corrective regimen sliding scale   SubCutaneous three times a day before meals  insulin lispro (HumaLOG) corrective regimen sliding scale   SubCutaneous at bedtime  insulin lispro Injectable (HumaLOG) 3 Unit(s) SubCutaneous three times a day before meals  insulin lispro Injectable (HumaLOG) 2 Unit(s) SubCutaneous at bedtime  dextrose 5%. 1000 milliLiter(s) IV Continuous <Continuous>  multivitamin 1 Tablet(s) Oral daily      PAST MEDICAL & SURGICAL HISTORY:  COPD (chronic obstructive pulmonary disease)  Localized enlarged lymph nodes  CHF (congestive heart failure): EF 40-45%  Subclavian vein stenosis, left: s/p stent  DKA, type 1: 1/2015  ACS (acute coronary syndrome): 1/2015 - cath revealed 100% ostial stenosis not amenable to PCI - medical management  TIA (transient ischemic attack): x 2 - 8-9 years ago prior to ASD/VSD repair  CAD (coronary artery disease): s/p stents  Gout: past  CVA (cerebral infarction): with no residual, 8 yrs ago, prior to heart surgery - ST memory loss  Peripheral vascular disease: occluded left fem-pop bypass 5/2015  Diabetes mellitus type 1: Insulin Dependent -  ESRD (end stage renal disease): dialysis  M, tue, thursday, saturday  Hyperlipidemia  Status post device closure of ASD: &quot;clamshell&quot;  History of cardiac catheterization: 1/2015 - no intervention  S/P femoral-popliteal bypass surgery: L and R in 2013 with graft; 5/2015 CFA angioplasty left and ileofemoral endarterectomywith vein patch angioplasty of left fem-pop bypass graft  Multiple vascular surgery both leg, left fempop bypass revision 11/2015  AV (arteriovenous fistula): Left AV graft; revision with stent placement 2-3 years ago  S/P cholecystectomy      FAMILY HISTORY:  Family history of smoking  Family history of hypertension  Family history of cancer (Sibling)      SOCIAL HISTORY:    former smoker. lives with . independent in adl[place]      REVIEW OF SYSTEMS:  See HPI, otherwise complete 10 point review of systems negative    [ ] All others negative	    PHYSICAL EXAM:  T(C): 36.7 (07-12-19 @ 05:44), Max: 36.7 (07-11-19 @ 10:36)  HR: 75 (07-12-19 @ 05:44) (71 - 106)  BP: 140/85 (07-12-19 @ 05:44) (120/55 - 171/72)  RR: 18 (07-12-19 @ 05:44) (16 - 18)  SpO2: 95% (07-12-19 @ 05:44) (92% - 100%)  Wt(kg): --  I&O's Summary    11 Jul 2019 07:01  -  12 Jul 2019 07:00  --------------------------------------------------------  IN: 420 mL / OUT: 2500 mL / NET: -2080 mL        Appearance: No Acute Distress	  HEENT:  Normal oral mucosa, PERRL, EOMI	  Cardiovascular: Normal S1 S2, No JVD, No murmurs/rubs/gallops  Respiratory: Lungs clear to auscultation bilaterally  Gastrointestinal:  Soft, Non-tender, + BS	  Skin: No rashes, No ecchymoses, No cyanosis	  Neurologic: Non-focal  Extremities: No clubbing, cyanosis or edema  Vascular: Peripheral pulses palpable 2+ bilaterally  Psychiatry: A & O x 3, Mood & affect appropriate    Laboratory Data:	 	    CBC Full  -  ( 12 Jul 2019 06:57 )  WBC Count : 5.9 K/uL  Hemoglobin : 11.1 g/dL  Hematocrit : 32.3 %  Platelet Count - Automated : 197 K/uL  Mean Cell Volume : 107.0 fl  Mean Cell Hemoglobin : 36.5 pg  Mean Cell Hemoglobin Concentration : 34.2 gm/dL  Auto Neutrophil # : x  Auto Lymphocyte # : x  Auto Monocyte # : x  Auto Eosinophil # : x  Auto Basophil # : x  Auto Neutrophil % : x  Auto Lymphocyte % : x  Auto Monocyte % : x  Auto Eosinophil % : x  Auto Basophil % : x    07-12    134<L>  |  92<L>  |  31<H>  ----------------------------<  355<H>  4.8   |  26  |  4.13<H>  07-11    135  |  92<L>  |  50<H>  ----------------------------<  282<H>  5.4<H>   |  24  |  5.71<H>    Ca    9.7      12 Jul 2019 06:57  Ca    9.3      11 Jul 2019 07:12  Mg     2.6     07-11        Interpretation of Telemetry: nsr pvc	    ECG:  	nsr as q waves nonspecitic st changes      Assessment:  -chest pain with normal delta trop and no acute ekg changes, doubt ACS  -multifocal PNA  -NSVT  -pAT  -volume overload  -ischemic cardiomyopathy, cad s/p multiple stents  -esrd on hd  -PAD s/p prior intervention   -malfunctioning AV fistula      Recs:  -chest pain with known extensive cad and ICM. s/p [facility] 2/20 --> severe and diffuse instent restenosis along RCA --> unable to stent due to multiple layers of stenting and prior brachytherapy. would consider complex intervention if true ischemic sx develop. cont with aggressive medical and anti-anginal therapy for now with anti-platelet, statins and metop, hydral and lisinopril. given unimpressive hs trop and ekg without acute findings in setting of atypica sx, would defer ischemic eval for now. would start on PPI for possible gastritis given pain occurred while eating (of note this could also be an anginal equivalent)   -c/w beta blockers for nsvt/pat/cad (as above)  -hx of prolonged qtc. avoid qtc prolonging meds  -s/p recent TTE: mod-severe MR, pseudo AS (low flow-low gradient), mod-severe LV dysfunction --> recent CTS consult with dr hebert appreciated. plan is for medical management given surgical risk and patient preference  -c/w asa, plavix, statin for ischemic cardiomyopathy/CAD/PAD  -dvt ppx      Greater than 60 minutes spent on total encounter; more than 50% of the visit was spent counseling and/or coordinating care by the attending physician.   	  Julián Biswas MD   Cardiovascular Diseases  [phone]

## 2019-07-12 NOTE — PROGRESS NOTE ADULT - ASSESSMENT
63 year old F with PMH uncontrolled DM1 with multiple complications and comorbidities including ESRD on HD, well known to endo team for frequent hospital admissions. Recent admission w/multi-focal pneumonia. Now readmitted for SOB/CP and fluid retention. Reports tolerating POs with glycemic control above goal while on present insulin doses. Pt states she is not eating in between meals or at night> doesn't understand why BGs are elevated. Reports insulin injections given in back of arms. Improved CP but concerned about fluid retention causing on/off SOB. Will add 2am Humalog correction scale to improve overnight hyperglycemia. Will not increase Lantus dose for now since pt is very insulin sensitive and has a high risk for hypoglycemia. Pt known to have BGs >400s and had f/u hypoglycemia with very low doses of insulin. Expecting BG to improve after HD.

## 2019-07-12 NOTE — CONSULT NOTE ADULT - SUBJECTIVE AND OBJECTIVE BOX
PULMONARY CONSULT NOTE      NATHAN MEEKS  MRN-[id number]    Patient is a 63y old  Female who presents with a chief complaint of chest pain (12 Jul 2019 14:11)      HISTORY OF PRESENT ILLNESS:  64 yo female with PMH significant for CAD (Cath Feb '19 showing 99% RCA, 100% RPLS, 100% Cx) s/p multiple stents/brachytherapy, ESRD (on HD T/T/Sa), DM1, CHF (EF 30% per last [facility]), mod MR, severe AS,  TIA, severe PVD s/p b/l fempop bypass, left subclavian vein stenosis s/p stent, COPD not on medications, previously admitted for parainfluenza virus  did see Dr Thompson when discharged at our office on 6/28.  was her baseline health until she developed sudden onset chest pain on 7/10/19 pm.  non radiating pain. occurred while at rest.  undergoing HD now due to hyperkalemia  denies any resp complaitns. no cough, no dyspnea at home  no fevers.         Allergies    No Known Allergies    Intolerances        PAST MEDICAL & SURGICAL HISTORY:  COPD (chronic obstructive pulmonary disease)  Localized enlarged lymph nodes  CHF (congestive heart failure): EF 40-45%  Subclavian vein stenosis, left: s/p stent  DKA, type 1: 1/2015  ACS (acute coronary syndrome): 1/2015 - cath revealed 100% ostial stenosis not amenable to PCI - medical management  TIA (transient ischemic attack): x 2 - 8-9 years ago prior to ASD/VSD repair  CAD (coronary artery disease): s/p stents  Gout: past  CVA (cerebral infarction): with no residual, 8 yrs ago, prior to heart surgery - ST memory loss  Peripheral vascular disease: occluded left fem-pop bypass 5/2015  Diabetes mellitus type 1: Insulin Dependent -  ESRD (end stage renal disease): dialysis  M, tue, thursday, saturday  Hyperlipidemia  Status post device closure of ASD: &quot;clamshell&quot;  History of cardiac catheterization: 1/2015 - no intervention  S/P femoral-popliteal bypass surgery: L and R in 2013 with graft; 5/2015 CFA angioplasty left and ileofemoral endarterectomywith vein patch angioplasty of left fem-pop bypass graft  Multiple vascular surgery both leg, left fempop bypass revision 11/2015  AV (arteriovenous fistula): Left AV graft; revision with stent placement 2-3 years ago  S/P cholecystectomy          FAMILY HISTORY:  Family history of smoking  Family history of hypertension  Family history of cancer (Sibling)    Prescriptions:  docusate sodium 100 mg oral capsule: 1 cap(s) orally 3 times a day (21 Jun 2019 13:16)  senna oral tablet: 2 tab(s) orally once a day (at bedtime), As needed, Constipation (21 Jun 2019 13:16)      SOCIAL HISTORY  Smoking History:  40 pack year     REVIEW OF SYSTEMS:    CONSTITUTIONAL:  No fevers, chills, sweats    HEENT:  Eyes:  No diplopia or blurred vision. ENT:  No earache, sore throat or runny nose.    CARDIOVASCULAR:   per HPI  RESPIRATORY:  Per HPI    GASTROINTESTINAL:  No abdominal pain, nausea, vomiting or diarrhea.    GENITOURINARY:  No dysuria, frequency or urgency.    NEUROLOGIC:  No paresthesias, fasciculations, seizures or weakness.    PSYCHIATRIC:  No disorder of thought or mood.    Vital Signs Last 24 Hrs  T(C): 36.6 (12 Jul 2019 13:10), Max: 36.7 (11 Jul 2019 15:58)  T(F): 97.8 (12 Jul 2019 13:10), Max: 98.1 (11 Jul 2019 15:58)  HR: 79 (12 Jul 2019 13:10) (71 - 79)  BP: 151/68 (12 Jul 2019 13:10) (120/55 - 164/85)  BP(mean): --  RR: 17 (12 Jul 2019 13:10) (16 - 18)  SpO2: 98% (12 Jul 2019 13:10) (92% - 100%)    PHYSICAL EXAMINATION:    GENERAL: The patient is a well-developed, well-nourished female in no apparent distress.     HEENT: Head is normocephalic and atraumatic. Extraocular muscles are intact. Mucous membranes are moist.     NECK: Supple.     LUNGS: Clear to auscultation without wheezing, rales, or rhonchi. Respirations unlabored    HEART: Regular rate and rhythm without murmur.    ABDOMEN: Soft, nontender, and nondistended.  No hepatosplenomegaly is noted.    EXTREMITIES: Without any cyanosis, clubbing, rash, lesions or edema.    NEUROLOGIC: Grossly intact      MEDICATIONS  (STANDING):  ALBUTerol/ipratropium for Nebulization 3 milliLiter(s) Nebulizer every 6 hours  aspirin enteric coated 81 milliGRAM(s) Oral daily  atorvastatin 20 milliGRAM(s) Oral at bedtime  clopidogrel Tablet 75 milliGRAM(s) Oral daily  dextrose 5%. 1000 milliLiter(s) (50 mL/Hr) IV Continuous <Continuous>  dextrose 50% Injectable 12.5 Gram(s) IV Push once  dextrose 50% Injectable 25 Gram(s) IV Push once  dextrose 50% Injectable 25 Gram(s) IV Push once  docusate sodium 100 milliGRAM(s) Oral three times a day  heparin  Injectable 5000 Unit(s) SubCutaneous every 12 hours  hydrALAZINE 25 milliGRAM(s) Oral three times a day  insulin glargine Injectable (LANTUS) 6 Unit(s) SubCutaneous at bedtime  insulin lispro (HumaLOG) corrective regimen sliding scale   SubCutaneous three times a day before meals  insulin lispro (HumaLOG) corrective regimen sliding scale   SubCutaneous at bedtime  insulin lispro (HumaLOG) corrective regimen sliding scale   SubCutaneous <User Schedule>  insulin lispro Injectable (HumaLOG) 2 Unit(s) SubCutaneous at bedtime  insulin lispro Injectable (HumaLOG) 3 Unit(s) SubCutaneous three times a day before meals  lisinopril 40 milliGRAM(s) Oral daily  metoprolol succinate ER 50 milliGRAM(s) Oral daily  multivitamin 1 Tablet(s) Oral daily  sevelamer carbonate 800 milliGRAM(s) Oral three times a day with meals      MEDICATIONS  (PRN):  dextrose 40% Gel 15 Gram(s) Oral once PRN Blood Glucose LESS THAN 70 milliGRAM(s)/deciliter  glucagon  Injectable 1 milliGRAM(s) IntraMuscular once PRN Glucose LESS THAN 70 milligrams/deciliter  oxyCODONE    5 mG/acetaminophen 325 mG 1 Tablet(s) Oral every 6 hours PRN Moderate Pain (4 - 6)  senna 2 Tablet(s) Oral at bedtime PRN Constipation        LABS:   CBC Full  -  ( 12 Jul 2019 06:57 )  WBC Count : 5.9 K/uL  RBC Count : 3.03 M/uL  Hemoglobin : 11.1 g/dL  Hematocrit : 32.3 %  Platelet Count - Automated : 197 K/uL  Mean Cell Volume : 107.0 fl  Mean Cell Hemoglobin : 36.5 pg  Mean Cell Hemoglobin Concentration : 34.2 gm/dL  Auto Neutrophil # : x  Auto Lymphocyte # : x  Auto Monocyte # : x  Auto Eosinophil # : x  Auto Basophil # : x  Auto Neutrophil % : x  Auto Lymphocyte % : x  Auto Monocyte % : x  Auto Eosinophil % : x  Auto Basophil % : x    PT/INR - ( 10 Jul 2019 22:21 )   PT: 11.2 sec;   INR: 0.97 ratio         PTT - ( 10 Jul 2019 22:21 )  PTT:30.5 sec  07-12    134<L>  |  92<L>  |  31<H>  ----------------------------<  355<H>  4.8   |  26  |  4.13<H>    Ca    9.7      12 Jul 2019 06:57  Mg     2.6     07-11    TPro  6.4  /  Alb  3.8  /  TBili  0.3  /  DBili  x   /  AST  26  /  ALT  17  /  AlkPhos  64  07-11                RADIOLOGY & ADDITIONAL STUDIES:  < from: Xray Chest 1 View AP/PA (07.10.19 @ 22:30) >    PROCEDURE DATE:  07/10/2019            INTERPRETATION:  EXAMINATION: XR CHEST    CLINICAL INDICATION: chest pain    TECHNIQUE: Single portable view of the chest was obtained.    COMPARISON: AP view the chest acquired 6/15/2019.    FINDINGS:     The cardiomediastinal silhouette is not well evaluated in this projection   however it appears stable from prior.  No focal consolidations, pleural effusions or pneumothoraces.  Left-sided subclavianvascular stents are present.  Degenerative changes of the thoracic spine are present.    IMPRESSION:   No focal consolidations.                  < end of copied text >    ECHO: none     ASSESSMENT:  64 yo female with multiple medical problems admitted for nonspecific chest pain    PLAN:  f/u cardio  f/u renal  neb PRN  stable pulm status     needs close f/u with Dr Thompson when discharged       Thank you for allowing me to participate in the care of this patient.  Please feel free to call me for any questions/concerns.      Chelle Kapoor DO  [facility] Pulmonary/Sleep Medicine  [phone]

## 2019-07-12 NOTE — PROGRESS NOTE ADULT - ASSESSMENT
63 f with    Chest pain/ CAD  - telemetry  - continue Rx  - cardiology evaluation Dr. Biswas noted    ESRD  - HD  - Nephrology evaluation Dr. Schmidt noted    Diabetes mellitus type 1  - BS control  -Endocrine evaluation noted    COPD  - nebs    HTN control    Possible GERD  - PPI    Further action as per clinical course   Pierce Viera MD pager [phone]

## 2019-07-12 NOTE — PROGRESS NOTE ADULT - ASSESSMENT
63F c hx CAD (Cath Feb '19 showing 99% RCA, 100% RPLS, 100% Cx) s/p multiple stents/brachytherapy, ESRD (on HD M/T/T/Sa), DM1 c/b neuropathy and retinopathy, CHF, mod MR, severe AS, RHF, TIA, severe PVD s/p b/l fempop bypass, left subclavian vein stenosis s/p stent, COPD not on O2, presenting with chest pain     1- esrd  2- hyperkalemia  3- chf  4- Dm brittle  5- htn   6- cad/cp       still with extensive fluid overload   hd 3 hr 2 liter 2 k bfr 400 dfr 600 f 160 dialyzer

## 2019-07-12 NOTE — PROGRESS NOTE ADULT - PROBLEM SELECTOR PLAN 1
-Test BG ac and hs  -C/w Lantus 6 units qhs plus Humalog 3 units ac meals.  -C/w low dose Humalog correction scale ac meals and lower HS sacle to low dose instead of moderate dose.  -Add Humalog low dose correction scale at 2am to control overnight hyperglycemia  -Plan to discharge on basal/bolus therapy. Doses TBD  -Pt to f/u with Dr Ley as out pt.   -Had spoken to pt's  about the need to assist and supervise pt with BG monitoring and insulin administration due to pt forgetfulness and limit ability to care for DM independently.   -Plan discussed with pt/team.  Contact info: [phone] (24/7). pager [phone]

## 2019-07-13 LAB
ANION GAP SERPL CALC-SCNC: 18 MMOL/L — HIGH (ref 5–17)
BUN SERPL-MCNC: 26 MG/DL — HIGH (ref 7–23)
CALCIUM SERPL-MCNC: 9 MG/DL — SIGNIFICANT CHANGE UP (ref 8.4–10.5)
CHLORIDE SERPL-SCNC: 93 MMOL/L — LOW (ref 96–108)
CO2 SERPL-SCNC: 27 MMOL/L — SIGNIFICANT CHANGE UP (ref 22–31)
CREAT SERPL-MCNC: 3.36 MG/DL — HIGH (ref 0.5–1.3)
GLUCOSE BLDC GLUCOMTR-MCNC: 134 MG/DL — HIGH (ref 70–99)
GLUCOSE BLDC GLUCOMTR-MCNC: 220 MG/DL — HIGH (ref 70–99)
GLUCOSE BLDC GLUCOMTR-MCNC: 258 MG/DL — HIGH (ref 70–99)
GLUCOSE BLDC GLUCOMTR-MCNC: 349 MG/DL — HIGH (ref 70–99)
GLUCOSE BLDC GLUCOMTR-MCNC: 371 MG/DL — HIGH (ref 70–99)
GLUCOSE SERPL-MCNC: 316 MG/DL — HIGH (ref 70–99)
POTASSIUM SERPL-MCNC: 4.4 MMOL/L — SIGNIFICANT CHANGE UP (ref 3.5–5.3)
POTASSIUM SERPL-SCNC: 4.4 MMOL/L — SIGNIFICANT CHANGE UP (ref 3.5–5.3)
SODIUM SERPL-SCNC: 138 MMOL/L — SIGNIFICANT CHANGE UP (ref 135–145)

## 2019-07-13 PROCEDURE — 99232 SBSQ HOSP IP/OBS MODERATE 35: CPT

## 2019-07-13 RX ORDER — INSULIN GLARGINE 100 [IU]/ML
7 INJECTION, SOLUTION SUBCUTANEOUS AT BEDTIME
Refills: 0 | Status: DISCONTINUED | OUTPATIENT
Start: 2019-07-13 | End: 2019-07-15

## 2019-07-13 RX ADMIN — Medication 2: at 14:20

## 2019-07-13 RX ADMIN — OXYCODONE AND ACETAMINOPHEN 1 TABLET(S): 5; 325 TABLET ORAL at 00:10

## 2019-07-13 RX ADMIN — Medication 3 UNIT(S): at 18:09

## 2019-07-13 RX ADMIN — Medication 3 UNIT(S): at 14:20

## 2019-07-13 RX ADMIN — Medication 50 MILLIGRAM(S): at 06:14

## 2019-07-13 RX ADMIN — Medication 3 MILLILITER(S): at 14:19

## 2019-07-13 RX ADMIN — Medication 3 MILLILITER(S): at 18:09

## 2019-07-13 RX ADMIN — SEVELAMER CARBONATE 800 MILLIGRAM(S): 2400 POWDER, FOR SUSPENSION ORAL at 14:19

## 2019-07-13 RX ADMIN — ATORVASTATIN CALCIUM 20 MILLIGRAM(S): 80 TABLET, FILM COATED ORAL at 21:13

## 2019-07-13 RX ADMIN — PANTOPRAZOLE SODIUM 40 MILLIGRAM(S): 20 TABLET, DELAYED RELEASE ORAL at 06:14

## 2019-07-13 RX ADMIN — Medication 81 MILLIGRAM(S): at 14:19

## 2019-07-13 RX ADMIN — Medication 100 MILLIGRAM(S): at 06:14

## 2019-07-13 RX ADMIN — Medication 1: at 22:01

## 2019-07-13 RX ADMIN — SEVELAMER CARBONATE 800 MILLIGRAM(S): 2400 POWDER, FOR SUSPENSION ORAL at 18:09

## 2019-07-13 RX ADMIN — Medication 2 UNIT(S): at 22:04

## 2019-07-13 RX ADMIN — Medication 2: at 02:07

## 2019-07-13 RX ADMIN — Medication 1 TABLET(S): at 14:20

## 2019-07-13 RX ADMIN — OXYCODONE AND ACETAMINOPHEN 1 TABLET(S): 5; 325 TABLET ORAL at 09:04

## 2019-07-13 RX ADMIN — Medication 3 UNIT(S): at 08:48

## 2019-07-13 RX ADMIN — Medication 25 MILLIGRAM(S): at 06:14

## 2019-07-13 RX ADMIN — Medication 25 MILLIGRAM(S): at 14:20

## 2019-07-13 RX ADMIN — OXYCODONE AND ACETAMINOPHEN 1 TABLET(S): 5; 325 TABLET ORAL at 21:30

## 2019-07-13 RX ADMIN — Medication 25 MILLIGRAM(S): at 21:12

## 2019-07-13 RX ADMIN — Medication 100 MILLIGRAM(S): at 14:20

## 2019-07-13 RX ADMIN — Medication 5: at 08:28

## 2019-07-13 RX ADMIN — LISINOPRIL 40 MILLIGRAM(S): 2.5 TABLET ORAL at 06:14

## 2019-07-13 RX ADMIN — OXYCODONE AND ACETAMINOPHEN 1 TABLET(S): 5; 325 TABLET ORAL at 19:38

## 2019-07-13 RX ADMIN — CLOPIDOGREL BISULFATE 75 MILLIGRAM(S): 75 TABLET, FILM COATED ORAL at 14:20

## 2019-07-13 RX ADMIN — INSULIN GLARGINE 7 UNIT(S): 100 INJECTION, SOLUTION SUBCUTANEOUS at 21:58

## 2019-07-13 RX ADMIN — Medication 3 MILLILITER(S): at 06:14

## 2019-07-13 RX ADMIN — SEVELAMER CARBONATE 800 MILLIGRAM(S): 2400 POWDER, FOR SUSPENSION ORAL at 08:49

## 2019-07-13 NOTE — PROGRESS NOTE ADULT - PROBLEM SELECTOR PLAN 1
-test BG AC/HS/2AM  -Increase Lantus 7 units QHS (slow titration)  -C/w Humalog 3 units AC meals for now (hold if not eating)  -c/w Humalog 2 units w/HS snack if eating  -c/w Humalog low correction scales AC and Low HS/2AM scale (correct >250mg/dl).   Plan to discharge on basal/bolus therapy. Doses TBD  -Pt to f/u with Dr Ley as out pt.   -Had spoken to pt's  about the need to assist and supervise pt with BG monitoring and insulin administration due to pt forgetfulness and limit ability to care for DM independently.  pager: [phone]

## 2019-07-13 NOTE — PROGRESS NOTE ADULT - ASSESSMENT
63F c hx CAD (Cath Feb '19 showing 99% RCA, 100% RPLS, 100% Cx) s/p multiple stents/brachytherapy, ESRD (on HD M/T/T/Sa), DM1 c/b neuropathy and retinopathy, CHF, mod MR, severe AS, RHF, TIA, severe PVD s/p b/l fempop bypass, left subclavian vein stenosis s/p stent, COPD not on O2, presenting with chest pain     1- esrd  2- hyperkalemia  3- chf  4- Dm brittle  5- htn   6- cad/cp       still with extensive fluid overload   hd  4.5 hr  2 k bfr 400 dfr 600 f 160 dialyzer first 2 hr uf 1.3 liter then 2.5 hr hd 1.5 liter

## 2019-07-13 NOTE — PROGRESS NOTE ADULT - ASSESSMENT
64 year old F with PMH uncontrolled DM1 with multiple complications and comorbidities including ESRD on HD, well known to endo team for frequent hospital admissions. Recent admission w/multi-focal pneumonia. Now readmitted for SOB/CP and fluid retention. Reports tolerating POs with glycemic control above goal while on present insulin doses. BG elevated this AM in setting of snacking overnight last night. Insulin requirements higher than baseline currently. Given insulin sensitivity and inconsistent diet, would continue to observe on current premeal doses prior to increasing mealtime insulin further until a pattern can be seen. BG goal (100-200mg/dl) given insulin sensitivity and renal disease.

## 2019-07-13 NOTE — PROGRESS NOTE ADULT - ASSESSMENT
63 f with    Chest pain/ CAD  - telemetry  - continue Rx  - cardiology follow    ESRD  - HD  - Nephrology follow  - fluid overloaded.    Diabetes mellitus type 1  - BS control  -Endocrine evaluation noted    COPD  - nebs    HTN control    Possible GERD  - PPI  Pierce Viera MD pager [phone]

## 2019-07-14 LAB
ANION GAP SERPL CALC-SCNC: 16 MMOL/L — SIGNIFICANT CHANGE UP (ref 5–17)
BUN SERPL-MCNC: 24 MG/DL — HIGH (ref 7–23)
CALCIUM SERPL-MCNC: 8.9 MG/DL — SIGNIFICANT CHANGE UP (ref 8.4–10.5)
CHLORIDE SERPL-SCNC: 93 MMOL/L — LOW (ref 96–108)
CO2 SERPL-SCNC: 25 MMOL/L — SIGNIFICANT CHANGE UP (ref 22–31)
CREAT SERPL-MCNC: 3.16 MG/DL — HIGH (ref 0.5–1.3)
GLUCOSE BLDC GLUCOMTR-MCNC: 133 MG/DL — HIGH (ref 70–99)
GLUCOSE BLDC GLUCOMTR-MCNC: 193 MG/DL — HIGH (ref 70–99)
GLUCOSE BLDC GLUCOMTR-MCNC: 227 MG/DL — HIGH (ref 70–99)
GLUCOSE BLDC GLUCOMTR-MCNC: 373 MG/DL — HIGH (ref 70–99)
GLUCOSE BLDC GLUCOMTR-MCNC: 460 MG/DL — CRITICAL HIGH (ref 70–99)
GLUCOSE SERPL-MCNC: 435 MG/DL — HIGH (ref 70–99)
HCT VFR BLD CALC: 28.9 % — LOW (ref 34.5–45)
HGB BLD-MCNC: 9.5 G/DL — LOW (ref 11.5–15.5)
MAGNESIUM SERPL-MCNC: 2 MG/DL — SIGNIFICANT CHANGE UP (ref 1.6–2.6)
MCHC RBC-ENTMCNC: 32.9 GM/DL — SIGNIFICANT CHANGE UP (ref 32–36)
MCHC RBC-ENTMCNC: 35.1 PG — HIGH (ref 27–34)
MCV RBC AUTO: 107 FL — HIGH (ref 80–100)
PHOSPHATE SERPL-MCNC: 4.2 MG/DL — SIGNIFICANT CHANGE UP (ref 2.5–4.5)
PLATELET # BLD AUTO: 181 K/UL — SIGNIFICANT CHANGE UP (ref 150–400)
POTASSIUM SERPL-MCNC: 4.3 MMOL/L — SIGNIFICANT CHANGE UP (ref 3.5–5.3)
POTASSIUM SERPL-SCNC: 4.3 MMOL/L — SIGNIFICANT CHANGE UP (ref 3.5–5.3)
RBC # BLD: 2.71 M/UL — LOW (ref 3.8–5.2)
RBC # FLD: 13 % — SIGNIFICANT CHANGE UP (ref 10.3–14.5)
SODIUM SERPL-SCNC: 134 MMOL/L — LOW (ref 135–145)
WBC # BLD: 4.8 K/UL — SIGNIFICANT CHANGE UP (ref 3.8–10.5)
WBC # FLD AUTO: 4.8 K/UL — SIGNIFICANT CHANGE UP (ref 3.8–10.5)

## 2019-07-14 PROCEDURE — 99232 SBSQ HOSP IP/OBS MODERATE 35: CPT

## 2019-07-14 RX ORDER — IBUPROFEN 200 MG
400 TABLET ORAL EVERY 6 HOURS
Refills: 0 | Status: COMPLETED | OUTPATIENT
Start: 2019-07-14 | End: 2019-07-15

## 2019-07-14 RX ORDER — INSULIN LISPRO 100/ML
3 VIAL (ML) SUBCUTANEOUS AT BEDTIME
Refills: 0 | Status: DISCONTINUED | OUTPATIENT
Start: 2019-07-14 | End: 2019-07-15

## 2019-07-14 RX ORDER — INSULIN LISPRO 100/ML
3 VIAL (ML) SUBCUTANEOUS
Refills: 0 | Status: DISCONTINUED | OUTPATIENT
Start: 2019-07-15 | End: 2019-07-15

## 2019-07-14 RX ORDER — INSULIN LISPRO 100/ML
4 VIAL (ML) SUBCUTANEOUS
Refills: 0 | Status: DISCONTINUED | OUTPATIENT
Start: 2019-07-14 | End: 2019-07-15

## 2019-07-14 RX ORDER — INSULIN LISPRO 100/ML
3 VIAL (ML) SUBCUTANEOUS
Refills: 0 | Status: DISCONTINUED | OUTPATIENT
Start: 2019-07-14 | End: 2019-07-15

## 2019-07-14 RX ADMIN — Medication 400 MILLIGRAM(S): at 22:10

## 2019-07-14 RX ADMIN — INSULIN GLARGINE 7 UNIT(S): 100 INJECTION, SOLUTION SUBCUTANEOUS at 21:42

## 2019-07-14 RX ADMIN — Medication 100 MILLIGRAM(S): at 21:41

## 2019-07-14 RX ADMIN — Medication 1: at 13:09

## 2019-07-14 RX ADMIN — Medication 1 TABLET(S): at 09:28

## 2019-07-14 RX ADMIN — Medication 50 MILLIGRAM(S): at 06:03

## 2019-07-14 RX ADMIN — OXYCODONE AND ACETAMINOPHEN 1 TABLET(S): 5; 325 TABLET ORAL at 18:00

## 2019-07-14 RX ADMIN — Medication 3 MILLILITER(S): at 11:49

## 2019-07-14 RX ADMIN — Medication 3 UNIT(S): at 13:10

## 2019-07-14 RX ADMIN — Medication 25 MILLIGRAM(S): at 06:02

## 2019-07-14 RX ADMIN — Medication 4 UNIT(S): at 17:56

## 2019-07-14 RX ADMIN — Medication 4: at 03:33

## 2019-07-14 RX ADMIN — Medication 3 MILLILITER(S): at 00:29

## 2019-07-14 RX ADMIN — Medication 25 MILLIGRAM(S): at 21:40

## 2019-07-14 RX ADMIN — OXYCODONE AND ACETAMINOPHEN 1 TABLET(S): 5; 325 TABLET ORAL at 01:27

## 2019-07-14 RX ADMIN — PANTOPRAZOLE SODIUM 40 MILLIGRAM(S): 20 TABLET, DELAYED RELEASE ORAL at 06:03

## 2019-07-14 RX ADMIN — ATORVASTATIN CALCIUM 20 MILLIGRAM(S): 80 TABLET, FILM COATED ORAL at 21:41

## 2019-07-14 RX ADMIN — Medication 3 MILLILITER(S): at 06:03

## 2019-07-14 RX ADMIN — Medication 3 UNIT(S): at 09:29

## 2019-07-14 RX ADMIN — CLOPIDOGREL BISULFATE 75 MILLIGRAM(S): 75 TABLET, FILM COATED ORAL at 09:29

## 2019-07-14 RX ADMIN — Medication 25 MILLIGRAM(S): at 13:10

## 2019-07-14 RX ADMIN — Medication 5: at 09:29

## 2019-07-14 RX ADMIN — Medication 81 MILLIGRAM(S): at 09:29

## 2019-07-14 RX ADMIN — OXYCODONE AND ACETAMINOPHEN 1 TABLET(S): 5; 325 TABLET ORAL at 01:59

## 2019-07-14 RX ADMIN — Medication 400 MILLIGRAM(S): at 21:38

## 2019-07-14 RX ADMIN — SEVELAMER CARBONATE 800 MILLIGRAM(S): 2400 POWDER, FOR SUSPENSION ORAL at 13:09

## 2019-07-14 RX ADMIN — SEVELAMER CARBONATE 800 MILLIGRAM(S): 2400 POWDER, FOR SUSPENSION ORAL at 17:53

## 2019-07-14 RX ADMIN — Medication 3 MILLILITER(S): at 17:54

## 2019-07-14 RX ADMIN — LISINOPRIL 40 MILLIGRAM(S): 2.5 TABLET ORAL at 06:03

## 2019-07-14 RX ADMIN — SEVELAMER CARBONATE 800 MILLIGRAM(S): 2400 POWDER, FOR SUSPENSION ORAL at 09:28

## 2019-07-14 RX ADMIN — OXYCODONE AND ACETAMINOPHEN 1 TABLET(S): 5; 325 TABLET ORAL at 20:25

## 2019-07-14 NOTE — PROVIDER CONTACT NOTE (OTHER) - ACTION/TREATMENT ORDERED:
notified NP  Iliana Shabazz-  to give sliding dose of  Humalog 4 units sc. Reinforced diabetic regimen.

## 2019-07-14 NOTE — PROGRESS NOTE ADULT - ASSESSMENT
64 year old F with PMH uncontrolled DM1 with multiple complications and comorbidities including ESRD on HD, well known to endo team for frequent hospital admissions. Recent admission w/multi-focal pneumonia. Now readmitted for SOB/CP and fluid retention. Reports tolerating POs with glycemic control above goal while on present insulin doses. Pt w/poor insight into her DM. Educated on carb intake as she states wasn't aware that crackers would turn to sugar in her body. Pt w/good appetite and PO intake during this hospital admission but is sensitive to insulin so will continue to slowly titrate as she tends to not eat consistently. BG goal (100-200mg/dl_

## 2019-07-14 NOTE — PROGRESS NOTE ADULT - PROBLEM SELECTOR PLAN 1
-test BG AC/HS/2AM  -c/w Lantus 7 units QHS  -Adjust Humalog 3-3-4 w/meals. Increase to Humalog 3 units w/HS snack (if eating)  -c/w Humalog low correction scale AC and low HS/2AM scale  Plan to discharge on basal/bolus therapy. Doses TBD  -Pt to f/u with Dr Ley as out pt.   -Pt's  assists pt w/DM care at home  -discussed w/pt and medicine NP  pager: [phone]

## 2019-07-14 NOTE — PROGRESS NOTE ADULT - ASSESSMENT
63 f with    Chest pain/ CAD  - telemetry  - continue Rx  - cardiology follow    ESRD  - HD  - Nephrology follow  - fluid overloaded.    Diabetes mellitus type 1  - BS control  -Endocrine evaluation noted    COPD  - nebs    HTN control    Possible GERD  - PPI    DCP home after HD if stable.  Pierce Viera MD pager [phone]

## 2019-07-15 ENCOUNTER — TRANSCRIPTION ENCOUNTER (OUTPATIENT)
Age: 64
End: 2019-07-15

## 2019-07-15 VITALS — WEIGHT: 117.95 LBS

## 2019-07-15 LAB
ANION GAP SERPL CALC-SCNC: 19 MMOL/L — HIGH (ref 5–17)
BUN SERPL-MCNC: 37 MG/DL — HIGH (ref 7–23)
CALCIUM SERPL-MCNC: 9.4 MG/DL — SIGNIFICANT CHANGE UP (ref 8.4–10.5)
CHLORIDE SERPL-SCNC: 88 MMOL/L — LOW (ref 96–108)
CO2 SERPL-SCNC: 27 MMOL/L — SIGNIFICANT CHANGE UP (ref 22–31)
CREAT SERPL-MCNC: 4.73 MG/DL — HIGH (ref 0.5–1.3)
GLUCOSE BLDC GLUCOMTR-MCNC: 129 MG/DL — HIGH (ref 70–99)
GLUCOSE BLDC GLUCOMTR-MCNC: 235 MG/DL — HIGH (ref 70–99)
GLUCOSE BLDC GLUCOMTR-MCNC: 335 MG/DL — HIGH (ref 70–99)
GLUCOSE BLDC GLUCOMTR-MCNC: 404 MG/DL — HIGH (ref 70–99)
GLUCOSE BLDC GLUCOMTR-MCNC: 409 MG/DL — HIGH (ref 70–99)
GLUCOSE SERPL-MCNC: 327 MG/DL — HIGH (ref 70–99)
POTASSIUM SERPL-MCNC: 4.7 MMOL/L — SIGNIFICANT CHANGE UP (ref 3.5–5.3)
POTASSIUM SERPL-SCNC: 4.7 MMOL/L — SIGNIFICANT CHANGE UP (ref 3.5–5.3)
SODIUM SERPL-SCNC: 134 MMOL/L — LOW (ref 135–145)

## 2019-07-15 PROCEDURE — 99261: CPT

## 2019-07-15 PROCEDURE — 96374 THER/PROPH/DIAG INJ IV PUSH: CPT

## 2019-07-15 PROCEDURE — 84100 ASSAY OF PHOSPHORUS: CPT

## 2019-07-15 PROCEDURE — 82550 ASSAY OF CK (CPK): CPT

## 2019-07-15 PROCEDURE — 82962 GLUCOSE BLOOD TEST: CPT

## 2019-07-15 PROCEDURE — 86803 HEPATITIS C AB TEST: CPT

## 2019-07-15 PROCEDURE — 82435 ASSAY OF BLOOD CHLORIDE: CPT

## 2019-07-15 PROCEDURE — 84132 ASSAY OF SERUM POTASSIUM: CPT

## 2019-07-15 PROCEDURE — 85610 PROTHROMBIN TIME: CPT

## 2019-07-15 PROCEDURE — 86704 HEP B CORE ANTIBODY TOTAL: CPT

## 2019-07-15 PROCEDURE — 94640 AIRWAY INHALATION TREATMENT: CPT

## 2019-07-15 PROCEDURE — 83605 ASSAY OF LACTIC ACID: CPT

## 2019-07-15 PROCEDURE — 83735 ASSAY OF MAGNESIUM: CPT

## 2019-07-15 PROCEDURE — 84484 ASSAY OF TROPONIN QUANT: CPT

## 2019-07-15 PROCEDURE — 80053 COMPREHEN METABOLIC PANEL: CPT

## 2019-07-15 PROCEDURE — 82330 ASSAY OF CALCIUM: CPT

## 2019-07-15 PROCEDURE — 85014 HEMATOCRIT: CPT

## 2019-07-15 PROCEDURE — 85730 THROMBOPLASTIN TIME PARTIAL: CPT

## 2019-07-15 PROCEDURE — 99285 EMERGENCY DEPT VISIT HI MDM: CPT | Mod: 25

## 2019-07-15 PROCEDURE — 82947 ASSAY GLUCOSE BLOOD QUANT: CPT

## 2019-07-15 PROCEDURE — 80048 BASIC METABOLIC PNL TOTAL CA: CPT

## 2019-07-15 PROCEDURE — 82553 CREATINE MB FRACTION: CPT

## 2019-07-15 PROCEDURE — 93005 ELECTROCARDIOGRAM TRACING: CPT

## 2019-07-15 PROCEDURE — 83880 ASSAY OF NATRIURETIC PEPTIDE: CPT

## 2019-07-15 PROCEDURE — 99232 SBSQ HOSP IP/OBS MODERATE 35: CPT

## 2019-07-15 PROCEDURE — 71045 X-RAY EXAM CHEST 1 VIEW: CPT

## 2019-07-15 PROCEDURE — 86706 HEP B SURFACE ANTIBODY: CPT

## 2019-07-15 PROCEDURE — 82565 ASSAY OF CREATININE: CPT

## 2019-07-15 PROCEDURE — 82803 BLOOD GASES ANY COMBINATION: CPT

## 2019-07-15 PROCEDURE — 84295 ASSAY OF SERUM SODIUM: CPT

## 2019-07-15 PROCEDURE — 87340 HEPATITIS B SURFACE AG IA: CPT

## 2019-07-15 PROCEDURE — 85027 COMPLETE CBC AUTOMATED: CPT

## 2019-07-15 RX ORDER — INSULIN LISPRO 100/ML
5 VIAL (ML) SUBCUTANEOUS
Refills: 0 | Status: DISCONTINUED | OUTPATIENT
Start: 2019-07-15 | End: 2019-07-15

## 2019-07-15 RX ORDER — INSULIN LISPRO 100/ML
4 VIAL (ML) SUBCUTANEOUS
Refills: 0 | Status: DISCONTINUED | OUTPATIENT
Start: 2019-07-15 | End: 2019-07-15

## 2019-07-15 RX ORDER — ENOXAPARIN SODIUM 100 MG/ML
6 INJECTION SUBCUTANEOUS
Qty: 0 | Refills: 0 | DISCHARGE

## 2019-07-15 RX ORDER — PANTOPRAZOLE SODIUM 20 MG/1
1 TABLET, DELAYED RELEASE ORAL
Qty: 30 | Refills: 0
Start: 2019-07-15 | End: 2019-08-13

## 2019-07-15 RX ORDER — INSULIN LISPRO 100/ML
3 VIAL (ML) SUBCUTANEOUS
Refills: 0 | Status: DISCONTINUED | OUTPATIENT
Start: 2019-07-15 | End: 2019-07-15

## 2019-07-15 RX ORDER — ERYTHROPOIETIN 10000 [IU]/ML
10000 INJECTION, SOLUTION INTRAVENOUS; SUBCUTANEOUS ONCE
Refills: 0 | Status: COMPLETED | OUTPATIENT
Start: 2019-07-15 | End: 2019-07-15

## 2019-07-15 RX ADMIN — Medication 1 TABLET(S): at 09:09

## 2019-07-15 RX ADMIN — Medication 81 MILLIGRAM(S): at 09:09

## 2019-07-15 RX ADMIN — PANTOPRAZOLE SODIUM 40 MILLIGRAM(S): 20 TABLET, DELAYED RELEASE ORAL at 09:09

## 2019-07-15 RX ADMIN — LISINOPRIL 40 MILLIGRAM(S): 2.5 TABLET ORAL at 05:18

## 2019-07-15 RX ADMIN — Medication 2: at 15:07

## 2019-07-15 RX ADMIN — Medication 25 MILLIGRAM(S): at 05:18

## 2019-07-15 RX ADMIN — OXYCODONE AND ACETAMINOPHEN 1 TABLET(S): 5; 325 TABLET ORAL at 00:45

## 2019-07-15 RX ADMIN — Medication 400 MILLIGRAM(S): at 09:09

## 2019-07-15 RX ADMIN — Medication 2: at 02:24

## 2019-07-15 RX ADMIN — OXYCODONE AND ACETAMINOPHEN 1 TABLET(S): 5; 325 TABLET ORAL at 15:25

## 2019-07-15 RX ADMIN — ERYTHROPOIETIN 10000 UNIT(S): 10000 INJECTION, SOLUTION INTRAVENOUS; SUBCUTANEOUS at 15:14

## 2019-07-15 RX ADMIN — Medication 400 MILLIGRAM(S): at 09:45

## 2019-07-15 RX ADMIN — Medication 6: at 09:14

## 2019-07-15 RX ADMIN — SEVELAMER CARBONATE 800 MILLIGRAM(S): 2400 POWDER, FOR SUSPENSION ORAL at 18:45

## 2019-07-15 RX ADMIN — Medication 3 UNIT(S): at 15:06

## 2019-07-15 RX ADMIN — Medication 3 MILLILITER(S): at 05:18

## 2019-07-15 RX ADMIN — Medication 3 MILLILITER(S): at 00:02

## 2019-07-15 RX ADMIN — OXYCODONE AND ACETAMINOPHEN 1 TABLET(S): 5; 325 TABLET ORAL at 14:48

## 2019-07-15 RX ADMIN — SEVELAMER CARBONATE 800 MILLIGRAM(S): 2400 POWDER, FOR SUSPENSION ORAL at 09:09

## 2019-07-15 RX ADMIN — Medication 50 MILLIGRAM(S): at 05:18

## 2019-07-15 RX ADMIN — Medication 3 UNIT(S): at 09:13

## 2019-07-15 RX ADMIN — Medication 100 MILLIGRAM(S): at 05:18

## 2019-07-15 RX ADMIN — CLOPIDOGREL BISULFATE 75 MILLIGRAM(S): 75 TABLET, FILM COATED ORAL at 09:09

## 2019-07-15 RX ADMIN — OXYCODONE AND ACETAMINOPHEN 1 TABLET(S): 5; 325 TABLET ORAL at 00:17

## 2019-07-15 NOTE — PROGRESS NOTE ADULT - REASON FOR ADMISSION
chest pain

## 2019-07-15 NOTE — DISCHARGE NOTE PROVIDER - PROVIDER TOKENS
PROVIDER:[TOKEN:[[id number]]],PROVIDER:[[id number]],FOLLOWUP:[1 week]],PROVIDER:[TOKEN:[[id number]],FOLLOWUP:[1 week]] PROVIDER:[TOKEN:[[id number]]],PROVIDER:[TOKEN:[[id number]],FOLLOWUP:[1 week]],PROVIDER:[[id number]],FOLLOWUP:[1 week]],PROVIDER:[[id number]],FOLLOWUP:[2 weeks]]

## 2019-07-15 NOTE — PROGRESS NOTE ADULT - PROVIDER SPECIALTY LIST ADULT
Cardiology
Cardiology
Endocrinology
Endocrinology
Internal Medicine
Nephrology
Pulmonology
Internal Medicine
Nephrology
Nephrology
Internal Medicine
Internal Medicine
Endocrinology
Endocrinology

## 2019-07-15 NOTE — CONSULT NOTE ADULT - SUBJECTIVE AND OBJECTIVE BOX
Patient is a 64y old  Female who presents with a chief complaint of chest pain (15 Jul 2019 17:05)  Dental paged for pt's tooth pain.      HPI:  63F c hx CAD (Cath Feb '19 showing 99% RCA, 100% RPLS, 100% Cx) s/p multiple stents/brachytherapy, ESRD (on HD M/T/T/Sa), DM1 c/b neuropathy and retinopathy, CHF, mod MR, severe AS, RHF, TIA, severe PVD s/p b/l fempop bypass, left subclavian vein stenosis s/p stent, COPD not on O2, presenting with chest pain x 1 hour started while watching TV suddenly substernal non radiating feels similar to prior cardiac events; pain is constant and present in the room. Also endorses worsening leg swelling x several days and leg pain. Denies recent f/c n/v/d, f/c, palpitations.    	No recent missed HD, has been taking medications as prescribed, (11 Jul 2019 08:31)      PAST MEDICAL & SURGICAL HISTORY:  COPD (chronic obstructive pulmonary disease)  Localized enlarged lymph nodes  CHF (congestive heart failure): EF 40-45%  Subclavian vein stenosis, left: s/p stent  DKA, type 1: 1/2015  ACS (acute coronary syndrome): 1/2015 - cath revealed 100% ostial stenosis not amenable to PCI - medical management  TIA (transient ischemic attack): x 2 - 8-9 years ago prior to ASD/VSD repair  CAD (coronary artery disease): s/p stents  Gout: past  CVA (cerebral infarction): with no residual, 8 yrs ago, prior to heart surgery - ST memory loss  Peripheral vascular disease: occluded left fem-pop bypass 5/2015  Diabetes mellitus type 1: Insulin Dependent -  ESRD (end stage renal disease): dialysis  M, tue, thursday, saturday  Hyperlipidemia  Status post device closure of ASD: &quot;clamshell&quot;  History of cardiac catheterization: 1/2015 - no intervention  S/P femoral-popliteal bypass surgery: L and R in 2013 with graft; 5/2015 CFA angioplasty left and ileofemoral endarterectomywith vein patch angioplasty of left fem-pop bypass graft  Multiple vascular surgery both leg, left fempop bypass revision 11/2015  AV (arteriovenous fistula): Left AV graft; revision with stent placement 2-3 years ago  S/P cholecystectomy      MEDICATIONS  (STANDING):  ALBUTerol/ipratropium for Nebulization 3 milliLiter(s) Nebulizer every 6 hours  aspirin enteric coated 81 milliGRAM(s) Oral daily  atorvastatin 20 milliGRAM(s) Oral at bedtime  clopidogrel Tablet 75 milliGRAM(s) Oral daily  dextrose 5%. 1000 milliLiter(s) (50 mL/Hr) IV Continuous <Continuous>  dextrose 50% Injectable 12.5 Gram(s) IV Push once  dextrose 50% Injectable 25 Gram(s) IV Push once  dextrose 50% Injectable 25 Gram(s) IV Push once  docusate sodium 100 milliGRAM(s) Oral three times a day  heparin  Injectable 5000 Unit(s) SubCutaneous every 12 hours  hydrALAZINE 25 milliGRAM(s) Oral three times a day  insulin glargine Injectable (LANTUS) 7 Unit(s) SubCutaneous at bedtime  insulin lispro (HumaLOG) corrective regimen sliding scale   SubCutaneous three times a day before meals  insulin lispro (HumaLOG) corrective regimen sliding scale   SubCutaneous at bedtime  insulin lispro (HumaLOG) corrective regimen sliding scale   SubCutaneous <User Schedule>  insulin lispro Injectable (HumaLOG) 3 Unit(s) SubCutaneous at bedtime  insulin lispro Injectable (HumaLOG) 3 Unit(s) SubCutaneous before lunch  insulin lispro Injectable (HumaLOG) 5 Unit(s) SubCutaneous before dinner  insulin lispro Injectable (HumaLOG) 4 Unit(s) SubCutaneous before breakfast  lisinopril 40 milliGRAM(s) Oral daily  metoprolol succinate ER 50 milliGRAM(s) Oral daily  multivitamin 1 Tablet(s) Oral daily  pantoprazole    Tablet 40 milliGRAM(s) Oral before breakfast  sevelamer carbonate 800 milliGRAM(s) Oral three times a day with meals    MEDICATIONS  (PRN):  dextrose 40% Gel 15 Gram(s) Oral once PRN Blood Glucose LESS THAN 70 milliGRAM(s)/deciliter  glucagon  Injectable 1 milliGRAM(s) IntraMuscular once PRN Glucose LESS THAN 70 milligrams/deciliter  oxyCODONE    5 mG/acetaminophen 325 mG 1 Tablet(s) Oral every 6 hours PRN Moderate Pain (4 - 6)  senna 2 Tablet(s) Oral at bedtime PRN Constipation      FAMILY HISTORY:  Family history of smoking  Family history of hypertension  Family history of cancer (Sibling)    Vital Signs Last 24 Hrs  T(C): 36.4 (15 Jul 2019 17:55), Max: 36.8 (14 Jul 2019 21:33)  T(F): 97.5 (15 Jul 2019 17:55), Max: 98.3 (14 Jul 2019 21:33)  HR: 88 (15 Jul 2019 18:39) (78 - 119)  BP: 144/68 (15 Jul 2019 18:44) (122/77 - 163/96)  BP(mean): --  RR: 18 (15 Jul 2019 18:39) (16 - 18)  SpO2: 99% (15 Jul 2019 18:39) (91% - 100%)    LABS:                        9.5    4.8   )-----------( 181      ( 14 Jul 2019 07:23 )             28.9     07-15    134<L>  |  88<L>  |  37<H>  ----------------------------<  327<H>  4.7   |  27  |  4.73<H>    Ca    9.4      15 Jul 2019 06:47  Phos  4.2     07-14  Mg     2.0     07-14    Platelet Count - Automated: 181 K/uL [150 - 400] (07-14 @ 07:23)    Upon arrival at Pt's room; pt. was discharged as she declined further treatment including a dental evaluation. No tx-rendered and no dental examination done. Confirmed discharge with Med Team who advised Pt. to seek outside dental care.    Kayden Silva DDS, Luke Harrison DDS; Pager #[phone]

## 2019-07-15 NOTE — DISCHARGE NOTE NURSING/CASE MANAGEMENT/SOCIAL WORK - NSDCFUADDAPPT_GEN_ALL_CORE_FT
Please Follow up with outpatient PMD in 1-2 weeks after  discharge   Please follow up with outpatient Nephrology in 1-2 weeks after discharge  Please Follow up with Endocrinology in 1-2 weeks after  discharge     Please call and make appointment in 1-2 weeks after discharge    Plan to discharge on basal/bolus therapy. Lantus 7 units q hs. If FBG <100s decrease dose to 5 units. Humalog 4 units ac meals. Patient  aware of the need to adjust according to PO intake. Pt is unable to do this independently but  and daughter manage DM at home.   - follow up  with Dr Ley as outpatient Endocrinology  please to follow up with  her Dentist  outpatient

## 2019-07-15 NOTE — DISCHARGE NOTE PROVIDER - CARE PROVIDER_API CALL
Braden Thompson (DO)  Internal Medicine; Pulmonary Disease  [address] [facility], [address]  [place] [address]  Phone: [phone]  Fax: [phone]  Follow Up Time:     Pina Ley)  EndocrinologyMetabDiabetes  [address] [place] [address]  Phone: [phone]  Fax: [phone]  Follow Up Time: 1 week    Daisy Burger (DO)  Nephrology  [address] [facility] [address]  [place] [address]  Phone: [phone]  Fax: [phone]  Follow Up Time: 1 week Braden Thompson (DO)  Internal Medicine; Pulmonary Disease  [address] [facility], [address]  [place] [address]  Phone: [phone]  Fax: [phone]  Follow Up Time:     Pina Ley)  EndocrinologyMetabDiabetes  [address] [place] [address]  Phone: [phone]  Fax: [phone]  Follow Up Time: 1 week    Daisy Burger (DO)  Nephrology  [address] [facility] [address]  [place] [address]  Phone: [phone]  Fax: [phone]  Follow Up Time: 1 week    Tee Biswas)  Cardiovascular Disease; Internal Medicine  [address] [place] [address]  Phone: [phone]  Fax: [phone]  Follow Up Time: 2 weeks

## 2019-07-15 NOTE — PROGRESS NOTE ADULT - ASSESSMENT
63F c hx CAD (Cath Feb '19 showing 99% RCA, 100% RPLS, 100% Cx) s/p multiple stents/brachytherapy, ESRD (on HD M/T/T/Sa), DM1 c/b neuropathy and retinopathy, CHF, mod MR, severe AS, RHF, TIA, severe PVD s/p b/l fempop bypass, left subclavian vein stenosis s/p stent, COPD not on O2, presenting with chest pain     1- esrd  2- hyperkalemia  3- chf  4- Dm brittle  5- htn   6- cad/cp       hd  4.5 hr  2 k bfr 400 dfr 600 f 160 dialyzer first 1 hr uf 700 then 3.5 hr 2.3  liter

## 2019-07-15 NOTE — PROGRESS NOTE ADULT - ASSESSMENT
63 f with    Chest pain/ CAD  - telemetry  - continue Rx  - cardiology follow    ESRD  - HD  - Nephrology follow  - fluid overload improving     Diabetes mellitus type 1  - BS control  -Endocrine evaluation noted    COPD  - nebs    HTN control    Possible GERD  - PPI    DC home. Follow with PMD/ Cardiology/ Nephrology / Endocrine in 2-3 days.  Pierce Viera MD pager [phone]

## 2019-07-15 NOTE — CHART NOTE - NSCHARTNOTEFT_GEN_A_CORE
Patient is a 64y old  Female who presents with a chief complaint of chest pain (15 Jul 2019 17:05)      Vital Signs Last 24 Hrs  T(C): 36.4 (15 Jul 2019 17:55), Max: 36.8 (14 Jul 2019 21:33)  T(F): 97.5 (15 Jul 2019 17:55), Max: 98.3 (14 Jul 2019 21:33)  HR: 100 (15 Jul 2019 17:55) (78 - 119)  BP: 139/74 (15 Jul 2019 17:55) (122/77 - 154/81)  BP(mean): --  RR: 18 (15 Jul 2019 17:55) (16 - 18)  SpO2: 100% (15 Jul 2019 17:55) (91% - 100%)      Labs:                          9.5    4.8   )-----------( 181      ( 14 Jul 2019 07:23 )             28.9     07-15    134<L>  |  88<L>  |  37<H>  ----------------------------<  327<H>  4.7   |  27  |  4.73<H>    Ca    9.4      15 Jul 2019 06:47  Phos  4.2     07-14  Mg     2.0     07-14              Radiology:    Physical Exam:  General: WN/WD NAD  Neurology: A&Ox3, nonfocal, CARR x 4  Head:  Normocephalic, atraumatic  Respiratory: CTA B/L  CV: RRR, S1S2, no murmur  Abdominal: Soft, NT, ND no palpable mass  MSK: No edema, + peripheral pulses, FROM all 4 extremity    Discharge Note and Plan: Patient medically stable for discharge   Discussed with Dr Viera reviewed medications   Please to follow up with outpatient Nephrology / Cardiology /Endocrine / Dentist  outpatient   Cleared patient   >  >  >

## 2019-07-15 NOTE — DISCHARGE NOTE PROVIDER - NSDCCPCAREPLAN_GEN_ALL_CORE_FT
PRINCIPAL DISCHARGE DIAGNOSIS  Diagnosis: Chest pain  Assessment and Plan of Treatment: Low salt, low fat diet.   Weight management.   Take medications as prescribed.    No smoking.  Follow up appointments with your doctor(s)  as instruced.        SECONDARY DISCHARGE DIAGNOSES  Diagnosis: HTN (hypertension)  Assessment and Plan of Treatment: Low salt diet  Activity as tolerated.  Take all medication as prescribed.  Follow up with your medical doctor for routine blood pressure monitoring at your next visit.  Notify your doctor if you have any of the following symptoms:   Dizziness, Lightheadedness, Blurry vision, Headache, Chest pain, Shortness of breath      Diagnosis: COPD (chronic obstructive pulmonary disease)  Assessment and Plan of Treatment: Call your Health Care provider upon arrival home to make a follow up appointment within one week.  Take all inhalers as prescribed by your Health Care Provider.  Take steroids as prescribed by your Health Care Provider.  If your cough increases infrequency and severity and/or you have shortness of breath or increased shortness of breath call your Health Care Provider.  If you develop fever, chills, night sweats, malaise, and/or change in mental status call your Health care Provider.  Nutrition is very important.  Eat small frequent meals.  Increase your activity as tolerated.  Do not stay in bed all day      Diagnosis: ESRD on dialysis  Assessment and Plan of Treatment: Avoid taking (NSAIDs) - (ex: Ibuprofen, Advil, Celebrex, Naprosyn)  Avoid taking any nephrotoxic agents (can harm kidneys) - Intravenous contrast for diagnostic testing, combination cold medications.  Have all medications adjusted for your renal function by your Health Care Provider.  Blood pressure control is important.  Take all medication as prescribed.      Diagnosis: Type 1 diabetes mellitus with hyperglycemia  Assessment and Plan of Treatment: HgA1C this admission.  Make sure you get your HgA1c checked every three months.  If you take oral diabetes medications, check your blood glucose two times a day.  If you take insulin, check your blood glucose before meals and at bedtime.  It's important not to skip any meals.  Keep a log of your blood glucose results and always take it with you to your doctor appointments.  Keep a list of your current medications including injectables and over the counter medications and bring this medication list with you to all your doctor appointments.  If you have not seen your opthalmologist this year call for appointment.  Check your feet daily for redness, sores, or openings. Do not self treat. If no improvement in two days call your primary care physician for an appointment.  Low blood sugar (hypoglycemia) is a blood sugar below 70mg/dl. Check your blood sugar if you feel signs/symptoms of hypoglycemia. If your blood sugar is below 70 take 15 grams of carbohydrates (ex 4 oz of apple juice, 3-4 glucosr tablets, or 4-6 oz of regular soda) wait 15 minutes and repeat blood sugar to make sure it comes up above 70.  If your blood sugar is above 70 and you are due for a meal, have a meal.  If you are not due for a meal have a snack.  This snack helps keeps your blood sugar at a safe range. PRINCIPAL DISCHARGE DIAGNOSIS  Diagnosis: Chest pain  Assessment and Plan of Treatment: Low salt, low fat diet.   Weight management.   Take medications as prescribed.    No smoking.  Follow up appointments with your doctor(s)  as instruced.        SECONDARY DISCHARGE DIAGNOSES  Diagnosis: HTN (hypertension)  Assessment and Plan of Treatment: Low salt diet  Activity as tolerated.  Take all medication as prescribed.  Follow up with your medical doctor for routine blood pressure monitoring at your next visit.  Notify your doctor if you have any of the following symptoms:   Dizziness, Lightheadedness, Blurry vision, Headache, Chest pain, Shortness of breath      Diagnosis: COPD (chronic obstructive pulmonary disease)  Assessment and Plan of Treatment: Call your Health Care provider upon arrival home to make a follow up appointment within one week.  Take all inhalers as prescribed by your Health Care Provider.  Take steroids as prescribed by your Health Care Provider.  If your cough increases infrequency and severity and/or you have shortness of breath or increased shortness of breath call your Health Care Provider.  If you develop fever, chills, night sweats, malaise, and/or change in mental status call your Health care Provider.  Nutrition is very important.  Eat small frequent meals.  Increase your activity as tolerated.  Do not stay in bed all day      Diagnosis: ESRD on dialysis  Assessment and Plan of Treatment: Avoid taking (NSAIDs) - (ex: Ibuprofen, Advil, Celebrex, Naprosyn)  Avoid taking any nephrotoxic agents (can harm kidneys) - Intravenous contrast for diagnostic testing, combination cold medications.  Have all medications adjusted for your renal function by your Health Care Provider.  Blood pressure control is important.  Take all medication as prescribed.      Diagnosis: Type 1 diabetes mellitus with hyperglycemia  Assessment and Plan of Treatment: HgA1C this admission.  Make sure you get your HgA1c checked every three months.  If you take oral diabetes medications, check your blood glucose two times a day.  If you take insulin, check your blood glucose before meals and at bedtime.  It's important not to skip any meals.  Keep a log of your blood glucose results and always take it with you to your doctor appointments.  Keep a list of your current medications including injectables and over the counter medications and bring this medication list with you to all your doctor appointments.  If you have not seen your opthalmologist this year call for appointment.  Check your feet daily for redness, sores, or openings. Do not self treat. If no improvement in two days call your primary care physician for an appointment.  Low blood sugar (hypoglycemia) is a blood sugar below 70mg/dl. Check your blood sugar if you feel signs/symptoms of hypoglycemia. If your blood sugar is below 70 take 15 grams of carbohydrates (ex 4 oz of apple juice, 3-4 glucosr tablets, or 4-6 oz of regular soda) wait 15 minutes and repeat blood sugar to make sure it comes up above 70.  If your blood sugar is above 70 and you are due for a meal, have a meal.  If you are not due for a meal have a snack.  This snack helps keeps your blood sugar at a safe range.  ********Patient Pre- dialysis weight 124.3 actual *****patient need to be weight at her dialysis outpatient tomorrow***please Follow up with Dr Burger outpatient PRINCIPAL DISCHARGE DIAGNOSIS  Diagnosis: Chest pain  Assessment and Plan of Treatment: Low salt, low fat diet.   Weight management.   Take medications as prescribed.    No smoking.  Follow up appointments with your doctor(s)  as instruced.        SECONDARY DISCHARGE DIAGNOSES  Diagnosis: HTN (hypertension)  Assessment and Plan of Treatment: Low salt diet  Activity as tolerated.  Take all medication as prescribed.  Follow up with your medical doctor for routine blood pressure monitoring at your next visit.  Notify your doctor if you have any of the following symptoms:   Dizziness, Lightheadedness, Blurry vision, Headache, Chest pain, Shortness of breath      Diagnosis: COPD (chronic obstructive pulmonary disease)  Assessment and Plan of Treatment: Call your Health Care provider upon arrival home to make a follow up appointment within one week.  Take all inhalers as prescribed by your Health Care Provider.  Take steroids as prescribed by your Health Care Provider.  If your cough increases infrequency and severity and/or you have shortness of breath or increased shortness of breath call your Health Care Provider.  If you develop fever, chills, night sweats, malaise, and/or change in mental status call your Health care Provider.  Nutrition is very important.  Eat small frequent meals.  Increase your activity as tolerated.  Do not stay in bed all day      Diagnosis: ESRD on dialysis  Assessment and Plan of Treatment: Avoid taking (NSAIDs) - (ex: Ibuprofen, Advil, Celebrex, Naprosyn)  Avoid taking any nephrotoxic agents (can harm kidneys) - Intravenous contrast for diagnostic testing, combination cold medications.  Have all medications adjusted for your renal function by your Health Care Provider.  Blood pressure control is important.  Take all medication as prescribed.  ********patient Pre weight actual standing before dialysis 124.3 pound   56.4 KG   ***patient need to check weight before dialysis   Please Follow with Dr Burger **      Diagnosis: Type 1 diabetes mellitus with hyperglycemia  Assessment and Plan of Treatment: HgA1C this admission.  Make sure you get your HgA1c checked every three months.  If you take oral diabetes medications, check your blood glucose two times a day.  If you take insulin, check your blood glucose before meals and at bedtime.  It's important not to skip any meals.  Keep a log of your blood glucose results and always take it with you to your doctor appointments.  Keep a list of your current medications including injectables and over the counter medications and bring this medication list with you to all your doctor appointments.  If you have not seen your opthalmologist this year call for appointment.  Check your feet daily for redness, sores, or openings. Do not self treat. If no improvement in two days call your primary care physician for an appointment.  Low blood sugar (hypoglycemia) is a blood sugar below 70mg/dl. Check your blood sugar if you feel signs/symptoms of hypoglycemia. If your blood sugar is below 70 take 15 grams of carbohydrates (ex 4 oz of apple juice, 3-4 glucosr tablets, or 4-6 oz of regular soda) wait 15 minutes and repeat blood sugar to make sure it comes up above 70.  If your blood sugar is above 70 and you are due for a meal, have a meal.  If you are not due for a meal have a snack.  This snack helps keeps your blood sugar at a safe range.

## 2019-07-15 NOTE — DISCHARGE NOTE NURSING/CASE MANAGEMENT/SOCIAL WORK - NSDCDPATPORTLINK_GEN_ALL_CORE
You can access the [facility] Patient Portal, offered by [facility], by registering with the following website: [email or web address]

## 2019-07-15 NOTE — DISCHARGE NOTE PROVIDER - NSDCFUADDAPPT_GEN_ALL_CORE_FT
Please Follow up with outpatient PMD in 1-2 weeks of discharge   Please follow up with outpatient Nephrology in 1-2 weeks for discharge  Please Follow up with Endocrinology in 1-2 weeks of discharge       Plan to discharge on basal/bolus therapy. Lantus 7 units q hs. If FBG <100s decrease dose to 5 units. Humalog 4 units ac meals. Pt aware of the need to adjust according to PO intake. Pt is unable to do this independently but  and daughter manage DM at home.   - follow up  with Dr Ley as outpatient Endocrinology Please Follow up with outpatient PMD in 1-2 weeks after  discharge   Please follow up with outpatient Nephrology in 1-2 weeks after discharge  Please Follow up with Endocrinology in 1-2 weeks after  discharge     Please call and make appointment in 1-2 weeks after discharge    Plan to discharge on basal/bolus therapy. Lantus 7 units q hs. If FBG <100s decrease dose to 5 units. Humalog 4 units ac meals. Patient  aware of the need to adjust according to PO intake. Pt is unable to do this independently but  and daughter manage DM at home.   - follow up  with Dr Ley as outpatient Endocrinology Please Follow up with outpatient PMD in 1-2 weeks after  discharge   Please follow up with outpatient Nephrology in 1-2 weeks after discharge  Please Follow up with Endocrinology in 1-2 weeks after  discharge     Please call and make appointment in 1-2 weeks after discharge    Plan to discharge on basal/bolus therapy. Lantus 7 units q hs. If FBG <100s decrease dose to 5 units. Humalog 4 units ac meals. Patient  aware of the need to adjust according to PO intake. Pt is unable to do this independently but  and daughter manage DM at home.   - follow up  with Dr Ley as outpatient Endocrinology  please to follow up with Dental outpatient Please Follow up with outpatient PMD in 1-2 weeks after  discharge   Please follow up with outpatient Nephrology in 1-2 weeks after discharge  Please Follow up with Endocrinology in 1-2 weeks after  discharge     Please call and make appointment in 1-2 weeks after discharge    Plan to discharge on basal/bolus therapy. Lantus 7 units q hs. If FBG <100s decrease dose to 5 units. Humalog 4 units ac meals. Patient  aware of the need to adjust according to PO intake. Pt is unable to do this independently but  and daughter manage DM at home.   - follow up  with Dr Ley as outpatient Endocrinology  please to follow up with  her Dentist  outpatient

## 2019-07-15 NOTE — PROGRESS NOTE ADULT - SUBJECTIVE AND OBJECTIVE BOX
DIABETES FOLLOW UP NOTE: Saw pt earlier today  INTERVAL HX: 63 year old F with PMH uncontrolled DM1 with multiple complications and comorbidities including ESRD on HD, well known to endo team for frequent hospital admissions. Recent admission w/multi-focal pneumonia. Now readmitted for SOB/CP and fluid retention. Pt states her LEs are unusually swollen. Reports BG at home are variable between 100s to 200s on and off but adherent to insulin therapy assisted by  and daughter. Reports tolerating POs with glycemic control above goal at hs and today while on present insulin doses. Pt states she is not eating in between meals or at night> doesn't understand why BGs are elevated. Reports insulin injections given in back of arms. Improved CP but concerned about fluid retention causing on/off SOB      Review of Systems:  Cardiovascular: No chest pain, palpitations  Respiratory: On/off SOB, no cough  GI: No nausea, vomiting, abdominal pain  Endocrine: no polyuria, polydipsia or S&Sx of hypoglycemia    Allergies    No Known Allergies    Intolerances      MEDICATIONS:  atorvastatin 20 milliGRAM(s) Oral at bedtime  insulin glargine Injectable (LANTUS) 6 Unit(s) SubCutaneous at bedtime  insulin lispro (HumaLOG) corrective regimen sliding scale   SubCutaneous three times a day before meals  insulin lispro (HumaLOG) corrective regimen sliding scale   SubCutaneous at bedtime  insulin lispro Injectable (HumaLOG) 3 Unit(s) SubCutaneous three times a day before meals  insulin lispro Injectable (HumaLOG) 2 Unit(s) SubCutaneous at bedtime      PHYSICAL EXAM:  VITALS: T(C): 36.6 (07-12-19 @ 13:10)  T(F): 97.8 (07-12-19 @ 13:10), Max: 98.1 (07-11-19 @ 15:58)  HR: 79 (07-12-19 @ 13:10) (71 - 79)  BP: 151/68 (07-12-19 @ 13:10) (120/55 - 164/85)  RR:  (16 - 18)  SpO2:  (92% - 100%)  Wt(kg): --  GENERAL: Female laying in bed in NAD  Abdomen: Soft, nontender, non distended  Extremities: Warm, 2+ edema in LEs  NEURO: A&O X3    LABS:  POCT Blood Glucose.: 308 mg/dL (07-12-19 @ 13:17)  POCT Blood Glucose.: 410 mg/dL (07-12-19 @ 09:02)  POCT Blood Glucose.: 412 mg/dL (07-12-19 @ 09:00)  POCT Blood Glucose.: 262 mg/dL (07-11-19 @ 22:05)  POCT Blood Glucose.: 164 mg/dL (07-11-19 @ 17:49)  POCT Blood Glucose.: 181 mg/dL (07-11-19 @ 13:18)  POCT Blood Glucose.: 250 mg/dL (07-11-19 @ 10:05)  POCT Blood Glucose.: 251 mg/dL (07-11-19 @ 02:27)  POCT Blood Glucose.: 219 mg/dL (07-10-19 @ 23:54)                            11.1   5.9   )-----------( 197      ( 12 Jul 2019 06:57 )             32.3       07-12    134<L>  |  92<L>  |  31<H>  ----------------------------<  355<H>  4.8   |  26  |  4.13<H>      EGFR if non : 11<L>    Ca    9.7      07-12  Mg     2.6     07-11    TPro  6.4  /  Alb  3.8  /  TBili  0.3  /  DBili  x   /  AST  26  /  ALT  17  /  AlkPhos  64  07-11        Hemoglobin A1C, Whole Blood: 8.2 % <H> [4.0 - 5.6] (06-16-19 @ 13:24)  Hemoglobin A1C, Whole Blood: 8.8 % <H> [4.0 - 5.6] (04-30-19 @ 08:58)
[facility]   [phone]  DIALYSIS NOTE  Chief Complaint: ESRD/Ongoing hemodialysis requirement.     24 hour events/subjective:      seen earlier.         ALLERGIES & MEDICATIONS  --------------------------------------------------------------------------------  Allergies    No Known Allergies    Intolerances      Standing Inpatient Medications  ALBUTerol/ipratropium for Nebulization 3 milliLiter(s) Nebulizer every 6 hours  aspirin enteric coated 81 milliGRAM(s) Oral daily  atorvastatin 20 milliGRAM(s) Oral at bedtime  clopidogrel Tablet 75 milliGRAM(s) Oral daily  dextrose 5%. 1000 milliLiter(s) IV Continuous <Continuous>  dextrose 50% Injectable 12.5 Gram(s) IV Push once  dextrose 50% Injectable 25 Gram(s) IV Push once  dextrose 50% Injectable 25 Gram(s) IV Push once  docusate sodium 100 milliGRAM(s) Oral three times a day  heparin  Injectable 5000 Unit(s) SubCutaneous every 12 hours  hydrALAZINE 25 milliGRAM(s) Oral three times a day  insulin glargine Injectable (LANTUS) 6 Unit(s) SubCutaneous at bedtime  insulin lispro (HumaLOG) corrective regimen sliding scale   SubCutaneous three times a day before meals  insulin lispro (HumaLOG) corrective regimen sliding scale   SubCutaneous at bedtime  insulin lispro (HumaLOG) corrective regimen sliding scale   SubCutaneous <User Schedule>  insulin lispro Injectable (HumaLOG) 2 Unit(s) SubCutaneous at bedtime  insulin lispro Injectable (HumaLOG) 3 Unit(s) SubCutaneous three times a day before meals  lisinopril 40 milliGRAM(s) Oral daily  metoprolol succinate ER 50 milliGRAM(s) Oral daily  multivitamin 1 Tablet(s) Oral daily  pantoprazole    Tablet 40 milliGRAM(s) Oral before breakfast  sevelamer carbonate 800 milliGRAM(s) Oral three times a day with meals    PRN Inpatient Medications  dextrose 40% Gel 15 Gram(s) Oral once PRN  glucagon  Injectable 1 milliGRAM(s) IntraMuscular once PRN  oxyCODONE    5 mG/acetaminophen 325 mG 1 Tablet(s) Oral every 6 hours PRN  senna 2 Tablet(s) Oral at bedtime PRN      REVIEW OF SYSTEMS  --------------------------------------------------------------------------------    no fever or chill  no cp or palp   no sob or cough   no N/V/D/ no abd pain   ext  + edema         VITALS/PHYSICAL EXAM  --------------------------------------------------------------------------------  T(C): 36.7 (07-13-19 @ 08:55), Max: 36.9 (07-12-19 @ 16:10)  HR: 84 (07-13-19 @ 08:55) (74 - 87)  BP: 152/56 (07-13-19 @ 08:55) (116/72 - 152/56)  RR: 18 (07-13-19 @ 08:55) (17 - 18)  SpO2: 96% (07-13-19 @ 08:55) (96% - 98%)  Wt(kg): --        07-12-19 @ 07:01  -  07-13-19 @ 07:00  --------------------------------------------------------  IN: 660 mL / OUT: 2300 mL / NET: -1640 mL      Physical Exam:  		  Gen: chronic ill appearing   	no jvd ,  	Pulm: decrease bs  no rales or ronchi or wheezing  	CV: RRR, S1S2; no rub  	Abd: +BS, soft, nontender/nondistended  	: No suprapubic tenderness  	UE: Warm, no cyanosis  no clubbing,  no edema  	LE: Warm, no cyanosis  no clubbing, 2+  edema  	Neuro: alert and oriented. speech coherent   	Vascular access: + avf + bruit and thrill   	  	  	  LABS/STUDIES  --------------------------------------------------------------------------------              11.1   5.9   >-----------<  197      [07-12-19 @ 06:57]              32.3     138  |  93  |  26  ----------------------------<  316      [07-13-19 @ 07:09]  4.4   |  27  |  3.36        Ca     9.0     [07-13-19 @ 07:09]                    imp/suggest: ESRD      Hemodialysis Prescription:  	Access:  	Dialyzer: revaclear   	Blood Flow (mL/Min): 400  	Dialysate Flow (mL/Min): 600  	Target UF (Liters):  	Treatment Time:  	Potassium:   	Calcium: 2.5  	  YOLANDA    Vitamin D     continue with hd   see hd flow sheet
Diabetes Follow up note:  Interval Hx:  64 year old F with PMH uncontrolled DM1 with multiple complications and comorbidities including ESRD on HD, well known to endo team for frequent hospital admissions. Recent admission w/multi-focal pneumonia. Now readmitted for SOB/CP and fluid retention. Pt states her LEs are unusually swollen. BG 300s overnight and this AM prior to HD. Pt reports eating crackers w/peanut butter around 2AM as she felt hungry. Reports foot pain otherwise no complaints today.     Review of Systems:  General: "today is my birthday!"  GI: Tolerating POs without any N/V/D/ABD PAIN.  CV: No CP/SOB  ENDO: No S&Sx of hypoglycemia  MEDS:  atorvastatin 20 milliGRAM(s) Oral at bedtime    insulin glargine Injectable (LANTUS) 6 Unit(s) SubCutaneous at bedtime  insulin lispro (HumaLOG) corrective regimen sliding scale   SubCutaneous three times a day before meals  insulin lispro (HumaLOG) corrective regimen sliding scale   SubCutaneous at bedtime  insulin lispro (HumaLOG) corrective regimen sliding scale   SubCutaneous <User Schedule>  insulin lispro Injectable (HumaLOG) 2 Unit(s) SubCutaneous at bedtime  insulin lispro Injectable (HumaLOG) 3 Unit(s) SubCutaneous three times a day before meals      Allergies    No Known Allergies        PE:  General: Female lying in bed. NAD.   Vital Signs Last 24 Hrs  T(C): 36.7 (13 Jul 2019 08:55), Max: 36.9 (12 Jul 2019 16:10)  T(F): 98 (13 Jul 2019 08:55), Max: 98.4 (12 Jul 2019 16:10)  HR: 84 (13 Jul 2019 08:55) (74 - 87)  BP: 152/56 (13 Jul 2019 08:55) (116/72 - 152/56)  BP(mean): --  RR: 18 (13 Jul 2019 08:55) (17 - 18)  SpO2: 96% (13 Jul 2019 08:55) (96% - 98%)  Abd: Soft, NT,ND,   Extremities: Warm. + LE edema   Neuro: A&O X3    LABS:    POCT Blood Glucose.: 371 mg/dL (07-13-19 @ 08:04)  POCT Blood Glucose.: 349 mg/dL (07-13-19 @ 01:37)  POCT Blood Glucose.: 284 mg/dL (07-12-19 @ 22:33)  POCT Blood Glucose.: 144 mg/dL (07-12-19 @ 17:59)  POCT Blood Glucose.: 308 mg/dL (07-12-19 @ 13:17)  POCT Blood Glucose.: 410 mg/dL (07-12-19 @ 09:02)  POCT Blood Glucose.: 412 mg/dL (07-12-19 @ 09:00)  POCT Blood Glucose.: 262 mg/dL (07-11-19 @ 22:05)  POCT Blood Glucose.: 164 mg/dL (07-11-19 @ 17:49)  POCT Blood Glucose.: 181 mg/dL (07-11-19 @ 13:18)  POCT Blood Glucose.: 250 mg/dL (07-11-19 @ 10:05)  POCT Blood Glucose.: 251 mg/dL (07-11-19 @ 02:27)  POCT Blood Glucose.: 219 mg/dL (07-10-19 @ 23:54)                            11.1   5.9   )-----------( 197      ( 12 Jul 2019 06:57 )             32.3       07-13    138  |  93<L>  |  26<H>  ----------------------------<  316<H>  4.4   |  27  |  3.36<H>    Ca    9.0      13 Jul 2019 07:09      Hemoglobin A1C, Whole Blood: 8.2 % <H> [4.0 - 5.6] (06-16-19 @ 13:24)  Hemoglobin A1C, Whole Blood: 8.8 % <H> [4.0 - 5.6] (04-30-19 @ 08:58)            Contact number: isabel [phone] or [phone]
Patient is a 64y old  Female who presents with a chief complaint of chest pain (14 Jul 2019 10:22)      SUBJECTIVE / OVERNIGHT EVENTS: feels better.  Review of Systems  chest pain no  palpitations no  sob no  nausea no  headache no    MEDICATIONS  (STANDING):  ALBUTerol/ipratropium for Nebulization 3 milliLiter(s) Nebulizer every 6 hours  aspirin enteric coated 81 milliGRAM(s) Oral daily  atorvastatin 20 milliGRAM(s) Oral at bedtime  clopidogrel Tablet 75 milliGRAM(s) Oral daily  dextrose 5%. 1000 milliLiter(s) (50 mL/Hr) IV Continuous <Continuous>  dextrose 50% Injectable 12.5 Gram(s) IV Push once  dextrose 50% Injectable 25 Gram(s) IV Push once  dextrose 50% Injectable 25 Gram(s) IV Push once  docusate sodium 100 milliGRAM(s) Oral three times a day  heparin  Injectable 5000 Unit(s) SubCutaneous every 12 hours  hydrALAZINE 25 milliGRAM(s) Oral three times a day  insulin glargine Injectable (LANTUS) 7 Unit(s) SubCutaneous at bedtime  insulin lispro (HumaLOG) corrective regimen sliding scale   SubCutaneous three times a day before meals  insulin lispro (HumaLOG) corrective regimen sliding scale   SubCutaneous at bedtime  insulin lispro (HumaLOG) corrective regimen sliding scale   SubCutaneous <User Schedule>  insulin lispro Injectable (HumaLOG) 3 Unit(s) SubCutaneous at bedtime  insulin lispro Injectable (HumaLOG) 3 Unit(s) SubCutaneous before lunch  insulin lispro Injectable (HumaLOG) 4 Unit(s) SubCutaneous before dinner  lisinopril 40 milliGRAM(s) Oral daily  metoprolol succinate ER 50 milliGRAM(s) Oral daily  multivitamin 1 Tablet(s) Oral daily  pantoprazole    Tablet 40 milliGRAM(s) Oral before breakfast  sevelamer carbonate 800 milliGRAM(s) Oral three times a day with meals    MEDICATIONS  (PRN):  dextrose 40% Gel 15 Gram(s) Oral once PRN Blood Glucose LESS THAN 70 milliGRAM(s)/deciliter  glucagon  Injectable 1 milliGRAM(s) IntraMuscular once PRN Glucose LESS THAN 70 milligrams/deciliter  oxyCODONE    5 mG/acetaminophen 325 mG 1 Tablet(s) Oral every 6 hours PRN Moderate Pain (4 - 6)  senna 2 Tablet(s) Oral at bedtime PRN Constipation      Vital Signs Last 24 Hrs  T(C): 36.6 (14 Jul 2019 12:36), Max: 36.9 (13 Jul 2019 23:49)  T(F): 97.9 (14 Jul 2019 12:36), Max: 98.5 (13 Jul 2019 23:49)  HR: 75 (14 Jul 2019 12:36) (75 - 81)  BP: 157/78 (14 Jul 2019 12:36) (112/54 - 157/78)  BP(mean): --  RR: 18 (14 Jul 2019 12:36) (16 - 18)  SpO2: 100% (14 Jul 2019 12:36) (97% - 100%)    PHYSICAL EXAM:  GENERAL: NAD, well-developed  HEAD:  Atraumatic, Normocephalic  EYES: EOMI, PERRLA, conjunctiva and sclera clear  NECK: Supple, No JVD  CHEST/LUNG: Clear to auscultation bilaterally; No wheeze  HEART: Regular rate and rhythm; No murmurs, rubs, or gallops  ABDOMEN: Soft, Nontender, Nondistended; Bowel sounds present  EXTREMITIES:  2+bl edema  PSYCH: Anxious  NEUROLOGY: non-focal  SKIN: No rashes or lesions    LABS:                        9.5    4.8   )-----------( 181      ( 14 Jul 2019 07:23 )             28.9     07-14    134<L>  |  93<L>  |  24<H>  ----------------------------<  435<H>  4.3   |  25  |  3.16<H>    Ca    8.9      14 Jul 2019 07:19  Phos  4.2     07-14  Mg     2.0     07-14                  RADIOLOGY & ADDITIONAL TESTS:    Imaging Personally Reviewed:    Consultant(s) Notes Reviewed:      Care Discussed with Consultants/Other Providers:
Patient is a 64y old  Female who presents with a chief complaint of chest pain (15 Jul 2019 16:39)      SUBJECTIVE / OVERNIGHT EVENTS: feels better Wants to go home.  Review of Systems  chest pain no  palpitations no  sob no  nausea no  headache no    MEDICATIONS  (STANDING):  ALBUTerol/ipratropium for Nebulization 3 milliLiter(s) Nebulizer every 6 hours  aspirin enteric coated 81 milliGRAM(s) Oral daily  atorvastatin 20 milliGRAM(s) Oral at bedtime  clopidogrel Tablet 75 milliGRAM(s) Oral daily  dextrose 5%. 1000 milliLiter(s) (50 mL/Hr) IV Continuous <Continuous>  dextrose 50% Injectable 12.5 Gram(s) IV Push once  dextrose 50% Injectable 25 Gram(s) IV Push once  dextrose 50% Injectable 25 Gram(s) IV Push once  docusate sodium 100 milliGRAM(s) Oral three times a day  heparin  Injectable 5000 Unit(s) SubCutaneous every 12 hours  hydrALAZINE 25 milliGRAM(s) Oral three times a day  insulin glargine Injectable (LANTUS) 7 Unit(s) SubCutaneous at bedtime  insulin lispro (HumaLOG) corrective regimen sliding scale   SubCutaneous three times a day before meals  insulin lispro (HumaLOG) corrective regimen sliding scale   SubCutaneous at bedtime  insulin lispro (HumaLOG) corrective regimen sliding scale   SubCutaneous <User Schedule>  insulin lispro Injectable (HumaLOG) 3 Unit(s) SubCutaneous at bedtime  insulin lispro Injectable (HumaLOG) 3 Unit(s) SubCutaneous before lunch  insulin lispro Injectable (HumaLOG) 5 Unit(s) SubCutaneous before dinner  insulin lispro Injectable (HumaLOG) 4 Unit(s) SubCutaneous before breakfast  lisinopril 40 milliGRAM(s) Oral daily  metoprolol succinate ER 50 milliGRAM(s) Oral daily  multivitamin 1 Tablet(s) Oral daily  pantoprazole    Tablet 40 milliGRAM(s) Oral before breakfast  sevelamer carbonate 800 milliGRAM(s) Oral three times a day with meals    MEDICATIONS  (PRN):  dextrose 40% Gel 15 Gram(s) Oral once PRN Blood Glucose LESS THAN 70 milliGRAM(s)/deciliter  glucagon  Injectable 1 milliGRAM(s) IntraMuscular once PRN Glucose LESS THAN 70 milligrams/deciliter  oxyCODONE    5 mG/acetaminophen 325 mG 1 Tablet(s) Oral every 6 hours PRN Moderate Pain (4 - 6)  senna 2 Tablet(s) Oral at bedtime PRN Constipation      Vital Signs Last 24 Hrs  T(C): 36.4 (15 Jul 2019 13:20), Max: 36.8 (14 Jul 2019 21:33)  T(F): 97.5 (15 Jul 2019 13:20), Max: 98.3 (14 Jul 2019 21:33)  HR: 79 (15 Jul 2019 13:20) (78 - 119)  BP: 140/76 (15 Jul 2019 13:20) (122/77 - 154/81)  BP(mean): --  RR: 17 (15 Jul 2019 13:20) (16 - 18)  SpO2: 99% (15 Jul 2019 13:20) (91% - 100%)    PHYSICAL EXAM:  GENERAL: NAD, well-developed  HEAD:  Atraumatic, Normocephalic  EYES: EOMI, PERRLA, conjunctiva and sclera clear  NECK: Supple, No JVD  CHEST/LUNG: Clear to auscultation bilaterally; No wheeze  HEART: Regular rate and rhythm; No murmurs, rubs, or gallops  ABDOMEN: Soft, Nontender, Nondistended; Bowel sounds present  EXTREMITIES:  1+ bl edema  PSYCH: AAOx3  NEUROLOGY: non-focal  SKIN: No rashes or lesions    LABS:                        9.5    4.8   )-----------( 181      ( 14 Jul 2019 07:23 )             28.9     07-15    134<L>  |  88<L>  |  37<H>  ----------------------------<  327<H>  4.7   |  27  |  4.73<H>    Ca    9.4      15 Jul 2019 06:47  Phos  4.2     07-14  Mg     2.0     07-14                  RADIOLOGY & ADDITIONAL TESTS:    Imaging Personally Reviewed:    Consultant(s) Notes Reviewed:      Care Discussed with Consultants/Other Providers:
[facility]   [phone]  DIALYSIS NOTE  Chief Complaint: ESRD/Ongoing hemodialysis requirement.     24 hour events/subjective:    seen earlier states feels better no sob  states wants to go home         ALLERGIES & MEDICATIONS  --------------------------------------------------------------------------------  Allergies    No Known Allergies    Intolerances      Standing Inpatient Medications  ALBUTerol/ipratropium for Nebulization 3 milliLiter(s) Nebulizer every 6 hours  aspirin enteric coated 81 milliGRAM(s) Oral daily  atorvastatin 20 milliGRAM(s) Oral at bedtime  clopidogrel Tablet 75 milliGRAM(s) Oral daily  dextrose 5%. 1000 milliLiter(s) IV Continuous <Continuous>  dextrose 50% Injectable 12.5 Gram(s) IV Push once  dextrose 50% Injectable 25 Gram(s) IV Push once  dextrose 50% Injectable 25 Gram(s) IV Push once  docusate sodium 100 milliGRAM(s) Oral three times a day  heparin  Injectable 5000 Unit(s) SubCutaneous every 12 hours  hydrALAZINE 25 milliGRAM(s) Oral three times a day  insulin glargine Injectable (LANTUS) 7 Unit(s) SubCutaneous at bedtime  insulin lispro (HumaLOG) corrective regimen sliding scale   SubCutaneous three times a day before meals  insulin lispro (HumaLOG) corrective regimen sliding scale   SubCutaneous at bedtime  insulin lispro (HumaLOG) corrective regimen sliding scale   SubCutaneous <User Schedule>  insulin lispro Injectable (HumaLOG) 3 Unit(s) SubCutaneous at bedtime  insulin lispro Injectable (HumaLOG) 3 Unit(s) SubCutaneous before lunch  insulin lispro Injectable (HumaLOG) 5 Unit(s) SubCutaneous before dinner  insulin lispro Injectable (HumaLOG) 4 Unit(s) SubCutaneous before breakfast  lisinopril 40 milliGRAM(s) Oral daily  metoprolol succinate ER 50 milliGRAM(s) Oral daily  multivitamin 1 Tablet(s) Oral daily  pantoprazole    Tablet 40 milliGRAM(s) Oral before breakfast  sevelamer carbonate 800 milliGRAM(s) Oral three times a day with meals    PRN Inpatient Medications  dextrose 40% Gel 15 Gram(s) Oral once PRN  glucagon  Injectable 1 milliGRAM(s) IntraMuscular once PRN  oxyCODONE    5 mG/acetaminophen 325 mG 1 Tablet(s) Oral every 6 hours PRN  senna 2 Tablet(s) Oral at bedtime PRN      REVIEW OF SYSTEMS  --------------------------------------------------------------------------------  no itching or rash  no fever or chill  no cp or palp   no sob or cough   no N/V/D/ no abd pain   ext  + edema         VITALS/PHYSICAL EXAM  --------------------------------------------------------------------------------  T(C): 36.4 (07-15-19 @ 17:55), Max: 36.8 (07-15-19 @ 08:24)  HR: 88 (07-15-19 @ 18:39) (78 - 119)  BP: 144/68 (07-15-19 @ 18:44) (122/77 - 163/96)  RR: 18 (07-15-19 @ 18:39) (16 - 18)  SpO2: 99% (07-15-19 @ 18:39) (91% - 100%)  Wt(kg): --        07-14-19 @ 07:01  -  07-15-19 @ 07:00  --------------------------------------------------------  IN: 600 mL / OUT: 0 mL / NET: 600 mL    07-15-19 @ 07:01  -  07-15-19 @ 21:45  --------------------------------------------------------  IN: 480 mL / OUT: 0 mL / NET: 480 mL      Physical Exam:  		    	Gen: chronic ill appearing   	no jvd ,  	Pulm: decrease bs  no rales or ronchi or wheezing  	CV: RRR, S1S2; no rub  	Abd: +BS, soft, nontender/nondistended  	: No suprapubic tenderness  	UE: Warm, no cyanosis  no clubbing,  2+  edema  	    LABS/STUDIES  --------------------------------------------------------------------------------              9.5    4.8   >-----------<  181      [07-14-19 @ 07:23]              28.9     134  |  88  |  37  ----------------------------<  327      [07-15-19 @ 06:47]  4.7   |  27  |  4.73        Ca     9.4     [07-15-19 @ 06:47]      Mg     2.0     [07-14-19 @ 07:19]      Phos  4.2     [07-14-19 @ 07:19]                    imp/suggest: ESRD      Hemodialysis Prescription:  	Access:  	Dialyzer: revaclear   	Blood Flow (mL/Min): 400  	Dialysate Flow (mL/Min): 600  	Target UF (Liters):  	Treatment Time:  	Potassium:   	Calcium: 2.5  	  YOLANDA    Vitamin D     continue with hd   see hd flow sheet
Cardiovascular Disease Progress Note    Overnight events: No acute events overnight.  no further episodes of cp. sitting in chair. feels well.   Otherwise review of systems negative    Objective Findings:  T(C): 36.7 (07-15-19 @ 04:33), Max: 36.8 (19 @ 21:33)  HR: 90 (07-15-19 @ 04:33) (75 - 91)  BP: 122/77 (07-15-19 @ 04:33) (122/77 - 157/78)  RR: 18 (07-15-19 @ 04:33) (18 - 18)  SpO2: 97% (07-15-19 @ 04:33) (91% - 100%)  Wt(kg): --  Daily     Daily Weight in k.6 (15 Jul 2019 02:06)      Physical Exam:  Gen: NAD  HEENT: EOMI  CV: RRR, normal S1 + S2, no m/r/g  Lungs: CTAB  Abd: soft, non-tender  Ext: No edema    Telemetry: nsr    Laboratory Data:                        9.5    4.8   )-----------( 181      ( 2019 07:23 )             28.9     07-15    134<L>  |  88<L>  |  37<H>  ----------------------------<  327<H>  4.7   |  27  |  4.73<H>    Ca    9.4      15 Jul 2019 06:47  Phos  4.2     07-14  Mg     2.0     07-14                Inpatient Medications:  MEDICATIONS  (STANDING):  ALBUTerol/ipratropium for Nebulization 3 milliLiter(s) Nebulizer every 6 hours  aspirin enteric coated 81 milliGRAM(s) Oral daily  atorvastatin 20 milliGRAM(s) Oral at bedtime  clopidogrel Tablet 75 milliGRAM(s) Oral daily  dextrose 5%. 1000 milliLiter(s) (50 mL/Hr) IV Continuous <Continuous>  dextrose 50% Injectable 12.5 Gram(s) IV Push once  dextrose 50% Injectable 25 Gram(s) IV Push once  dextrose 50% Injectable 25 Gram(s) IV Push once  docusate sodium 100 milliGRAM(s) Oral three times a day  heparin  Injectable 5000 Unit(s) SubCutaneous every 12 hours  hydrALAZINE 25 milliGRAM(s) Oral three times a day  insulin glargine Injectable (LANTUS) 7 Unit(s) SubCutaneous at bedtime  insulin lispro (HumaLOG) corrective regimen sliding scale   SubCutaneous three times a day before meals  insulin lispro (HumaLOG) corrective regimen sliding scale   SubCutaneous at bedtime  insulin lispro (HumaLOG) corrective regimen sliding scale   SubCutaneous <User Schedule>  insulin lispro Injectable (HumaLOG) 3 Unit(s) SubCutaneous at bedtime  insulin lispro Injectable (HumaLOG) 3 Unit(s) SubCutaneous before breakfast  insulin lispro Injectable (HumaLOG) 3 Unit(s) SubCutaneous before lunch  insulin lispro Injectable (HumaLOG) 4 Unit(s) SubCutaneous before dinner  lisinopril 40 milliGRAM(s) Oral daily  metoprolol succinate ER 50 milliGRAM(s) Oral daily  multivitamin 1 Tablet(s) Oral daily  pantoprazole    Tablet 40 milliGRAM(s) Oral before breakfast  sevelamer carbonate 800 milliGRAM(s) Oral three times a day with meals      Assessment:  -chest pain with normal delta trop and no acute ekg changes, doubt ACS  -multifocal PNA  -NSVT  -pAT  -volume overload  -ischemic cardiomyopathy, cad s/p multiple stents  -esrd on hd  -PAD s/p prior intervention   -malfunctioning AV fistula      Recs:  -chest pain with known extensive CAD and ICM. s/p [facility]  --> severe and diffuse instent restenosis along RCA --> unable to stent due to multiple layers of stenting and prior brachytherapy. would consider complex intervention if true ischemic sx develop. cont with aggressive medical and anti-anginal therapy for now with anti-platelet, statins and metop, hydral and lisinopril. given unimpressive hs trop and ekg without acute findings in setting of atypical sx, would defer ischemic eval for now. c/w PPI for possible gastritis given pain occurred while eating (of note this could also be an anginal equivalent)   -c/w beta blockers for nsvt/pat/cad (as above)  -hx of prolonged qtc. avoid qtc prolonging meds  -s/p recent TTE: mod-severe MR, pseudo AS (low flow-low gradient), mod-severe LV dysfunction --> recent CTS consult with dr hebert appreciated. plan is for medical management given surgical risk and patient preference  -c/w asa, plavix, statin for ischemic cardiomyopathy/CAD/PAD  -dvt ppx        Over 25 minutes spent on total encounter; more than 50% of the visit was spent counseling and/or coordinating care by the attending physician.      Julián Biswas MD   Cardiovascular Disease  [phone]
Cardiovascular Disease Progress Note    Overnight events: No acute events overnight.  no longer c/o cp. no new cardiac sx  Otherwise review of systems negative    Objective Findings:  T(C): 36.8 (19 @ 05:17), Max: 36.9 (19 @ 16:10)  HR: 87 (19 @ 05:17) (74 - 87)  BP: 116/72 (19 @ 05:17) (116/72 - 151/68)  RR: 18 (19 @ 05:17) (17 - 18)  SpO2: 98% (19 @ 05:17) (96% - 98%)  Wt(kg): --  Daily     Daily Weight in k.3 (2019 05:17)      Physical Exam:  Gen: NAD  HEENT: EOMI  CV: RRR, normal S1 + S2, no m/r/g  Lungs: CTAB  Abd: soft, non-tender  Ext: No edema    Telemetry: nsr    Laboratory Data:                        11.1   5.9   )-----------( 197      ( 2019 06:57 )             32.3     07-13    138  |  93<L>  |  26<H>  ----------------------------<  316<H>  4.4   |  27  |  3.36<H>    Ca    9.0      2019 07:09        CARDIAC MARKERS ( 2019 10:56 )  x     / x     / 302 U/L / x     / 6.9 ng/mL          Inpatient Medications:  MEDICATIONS  (STANDING):  ALBUTerol/ipratropium for Nebulization 3 milliLiter(s) Nebulizer every 6 hours  aspirin enteric coated 81 milliGRAM(s) Oral daily  atorvastatin 20 milliGRAM(s) Oral at bedtime  clopidogrel Tablet 75 milliGRAM(s) Oral daily  dextrose 5%. 1000 milliLiter(s) (50 mL/Hr) IV Continuous <Continuous>  dextrose 50% Injectable 12.5 Gram(s) IV Push once  dextrose 50% Injectable 25 Gram(s) IV Push once  dextrose 50% Injectable 25 Gram(s) IV Push once  docusate sodium 100 milliGRAM(s) Oral three times a day  heparin  Injectable 5000 Unit(s) SubCutaneous every 12 hours  hydrALAZINE 25 milliGRAM(s) Oral three times a day  insulin glargine Injectable (LANTUS) 6 Unit(s) SubCutaneous at bedtime  insulin lispro (HumaLOG) corrective regimen sliding scale   SubCutaneous three times a day before meals  insulin lispro (HumaLOG) corrective regimen sliding scale   SubCutaneous at bedtime  insulin lispro (HumaLOG) corrective regimen sliding scale   SubCutaneous <User Schedule>  insulin lispro Injectable (HumaLOG) 2 Unit(s) SubCutaneous at bedtime  insulin lispro Injectable (HumaLOG) 3 Unit(s) SubCutaneous three times a day before meals  lisinopril 40 milliGRAM(s) Oral daily  metoprolol succinate ER 50 milliGRAM(s) Oral daily  multivitamin 1 Tablet(s) Oral daily  pantoprazole    Tablet 40 milliGRAM(s) Oral before breakfast  sevelamer carbonate 800 milliGRAM(s) Oral three times a day with meals      Assessment:  -chest pain with normal delta trop and no acute ekg changes, doubt ACS  -multifocal PNA  -NSVT  -pAT  -volume overload  -ischemic cardiomyopathy, cad s/p multiple stents  -esrd on hd  -PAD s/p prior intervention   -malfunctioning AV fistula      Recs:  -chest pain with known extensive cad and ICM. s/p [facility]  --> severe and diffuse instent restenosis along RCA --> unable to stent due to multiple layers of stenting and prior brachytherapy. would consider complex intervention if true ischemic sx develop. cont with aggressive medical and anti-anginal therapy for now with anti-platelet, statins and metop, hydral and lisinopril. given unimpressive hs trop and ekg without acute findings in setting of atypica sx, would defer ischemic eval for now. would start on PPI for possible gastritis given pain occurred while eating (of note this could also be an anginal equivalent)   -c/w beta blockers for nsvt/pat/cad (as above)  -hx of prolonged qtc. avoid qtc prolonging meds  -s/p recent TTE: mod-severe MR, pseudo AS (low flow-low gradient), mod-severe LV dysfunction --> recent CTS consult with dr hebert appreciated. plan is for medical management given surgical risk and patient preference  -c/w asa, plavix, statin for ischemic cardiomyopathy/CAD/PAD  -dvt ppx      Over 25 minutes spent on total encounter; more than 50% of the visit was spent counseling and/or coordinating care by the attending physician.      Julián Biswas MD   Cardiovascular Disease  [phone]
Diabetes Follow up note:  Interval Hx:  64 year old F with PMH uncontrolled DM1 with multiple complications and comorbidities including ESRD on HD, well known to endo team for frequent hospital admissions. Recent admission w/multi-focal pneumonia. Now readmitted for SOB/CP and fluid retention. Pt w/severe hyperglycemia overnight. Pt reports, "I just had crackers and peanut butter!!". Pt reports eating around 10 crackers. Pt asking if she is getting HD today.     Review of Systems:  General: "I want to get out of here"  GI: Tolerating POs without any N/V/D/ABD PAIN.  CV: No CP/SOB  ENDO: No S&Sx of hypoglycemia  MEDS:  atorvastatin 20 milliGRAM(s) Oral at bedtime    insulin glargine Injectable (LANTUS) 7 Unit(s) SubCutaneous at bedtime  insulin lispro (HumaLOG) corrective regimen sliding scale   SubCutaneous three times a day before meals  insulin lispro (HumaLOG) corrective regimen sliding scale   SubCutaneous at bedtime  insulin lispro (HumaLOG) corrective regimen sliding scale   SubCutaneous <User Schedule>  insulin lispro Injectable (HumaLOG) 2 Unit(s) SubCutaneous at bedtime  insulin lispro Injectable (HumaLOG) 3 Unit(s) SubCutaneous three times a day before meals      Allergies    No Known Allergies        PE:  General: Female sitting in bed. NAD.   Vital Signs Last 24 Hrs  T(C): 36.4 (14 Jul 2019 04:17), Max: 36.9 (13 Jul 2019 23:49)  T(F): 97.5 (14 Jul 2019 04:17), Max: 98.5 (13 Jul 2019 23:49)  HR: 81 (14 Jul 2019 04:17) (76 - 81)  BP: 128/71 (14 Jul 2019 04:17) (112/54 - 129/60)  BP(mean): --  RR: 18 (14 Jul 2019 04:17) (16 - 18)  SpO2: 97% (14 Jul 2019 04:17) (97% - 99%)  Abd: Soft, NT,ND,   Extremities: Warm. Trace LE edema  Neuro: A&O X3    LABS:    POCT Blood Glucose.: 373 mg/dL (07-14-19 @ 09:20)  POCT Blood Glucose.: 460 mg/dL (07-14-19 @ 03:04)  POCT Blood Glucose.: 258 mg/dL (07-13-19 @ 21:56)  POCT Blood Glucose.: 134 mg/dL (07-13-19 @ 17:52)  POCT Blood Glucose.: 220 mg/dL (07-13-19 @ 14:11)  POCT Blood Glucose.: 371 mg/dL (07-13-19 @ 08:04)  POCT Blood Glucose.: 349 mg/dL (07-13-19 @ 01:37)  POCT Blood Glucose.: 284 mg/dL (07-12-19 @ 22:33)  POCT Blood Glucose.: 144 mg/dL (07-12-19 @ 17:59)  POCT Blood Glucose.: 308 mg/dL (07-12-19 @ 13:17)  POCT Blood Glucose.: 410 mg/dL (07-12-19 @ 09:02)  POCT Blood Glucose.: 412 mg/dL (07-12-19 @ 09:00)  POCT Blood Glucose.: 262 mg/dL (07-11-19 @ 22:05)  POCT Blood Glucose.: 164 mg/dL (07-11-19 @ 17:49)  POCT Blood Glucose.: 181 mg/dL (07-11-19 @ 13:18)                            9.5    4.8   )-----------( 181      ( 14 Jul 2019 07:23 )             28.9       07-14    134<L>  |  93<L>  |  24<H>  ----------------------------<  435<H>  4.3   |  25  |  3.16<H>    Ca    8.9      14 Jul 2019 07:19  Phos  4.2     07-14  Mg     2.0     07-14        Hemoglobin A1C, Whole Blood: 8.2 % <H> [4.0 - 5.6] (06-16-19 @ 13:24)  Hemoglobin A1C, Whole Blood: 8.8 % <H> [4.0 - 5.6] (04-30-19 @ 08:58)            Contact number: isabel [phone] or [phone]
Diabetes Follow up note:  Interval Hx: 64 year old F with PMH uncontrolled DM1 with multiple complications and comorbidities including ESRD on HD, well known to endo team for frequent hospital admissions. Recent admission w/multi-focal pneumonia. Now readmitted for SOB/CP and fluid retention. Pt w/hyperglycemia overnight lingering until am with BG 400s today. Pt reports feeling very hungry and eating crackers with peanut butter over night.  Pt lost count how many crackers she ate but says she had more than 10. Noted pt still eating crackers with peanut butter at time of visit (right before lunch time). Pt states she is leaving today after HD. Reports feeling much better.      Review of Systems:  General: "I'm going home today"  GI: Tolerating POs without any N/V/D/ABD PAIN.  CV: No CP/SOB  ENDO: No S&Sx of hypoglycemia      MEDS:  atorvastatin 20 milliGRAM(s) Oral at bedtime  insulin glargine Injectable (LANTUS) 7 Unit(s) SubCutaneous at bedtime  insulin lispro (HumaLOG) corrective regimen sliding scale   SubCutaneous three times a day before meals  insulin lispro (HumaLOG) corrective regimen sliding scale   SubCutaneous at bedtime  insulin lispro (HumaLOG) corrective regimen sliding scale   SubCutaneous <User Schedule>  insulin lispro Injectable (HumaLOG) 3 Unit(s) SubCutaneous at bedtime  insulin lispro Injectable (HumaLOG) 3 Unit(s) SubCutaneous before breakfast  insulin lispro Injectable (HumaLOG) 3 Unit(s) SubCutaneous before lunch  insulin lispro Injectable (HumaLOG) 4 Unit(s) SubCutaneous before dinner      Allergies    No Known Allergies        PE:  General: Female sitting in chair. NAD.   Vital Signs Last 24 Hrs  T(C): 36.4 (07-15-19 @ 13:20), Max: 36.8 (07-14-19 @ 21:33)  T(F): 97.5 (07-15-19 @ 13:20), Max: 98.3 (07-14-19 @ 21:33)  HR: 79 (07-15-19 @ 13:20) (78 - 119)  BP: 140/76 (07-15-19 @ 13:20) (122/77 - 154/81)  BP(mean): --  RR: 17 (07-15-19 @ 13:20) (16 - 18)  SpO2: 99% (07-15-19 @ 13:20) (91% - 100%)  Abd: Soft, NT, ND,   Extremities: Warm. Trace edema in LEs. L>R  Neuro: A&O X3    LABS:  POCT Blood Glucose.: 235 mg/dL (07-15-19 @ 14:55)  POCT Blood Glucose.: 404 mg/dL (07-15-19 @ 09:09)  POCT Blood Glucose.: 409 mg/dL (07-15-19 @ 09:08)  POCT Blood Glucose.: 335 mg/dL (07-15-19 @ 02:04)  POCT Blood Glucose.: 227 mg/dL (07-14-19 @ 22:04)  POCT Blood Glucose.: 133 mg/dL (07-14-19 @ 17:57)  POCT Blood Glucose.: 193 mg/dL (07-14-19 @ 13:04)  POCT Blood Glucose.: 373 mg/dL (07-14-19 @ 09:20)  POCT Blood Glucose.: 460 mg/dL (07-14-19 @ 03:04)  POCT Blood Glucose.: 258 mg/dL (07-13-19 @ 21:56)  POCT Blood Glucose.: 134 mg/dL (07-13-19 @ 17:52)                            9.5    4.8   )-----------( 181      ( 14 Jul 2019 07:23 )             28.9     07-15    134<L>  |  88<L>  |  37<H>  ----------------------------<  327<H>  4.7   |  27  |  4.73<H>    Ca    9.4      15 Jul 2019 06:47  Phos  4.2     07-14  Mg     2.0     07-14 07-14    134<L>  |  93<L>  |  24<H>  ----------------------------<  435<H>  4.3   |  25  |  3.16<H>    Ca    8.9      14 Jul 2019 07:19  Phos  4.2     07-14  Mg     2.0     07-14        Hemoglobin A1C, Whole Blood: 8.2 % <H> [4.0 - 5.6] (06-16-19 @ 13:24)  Hemoglobin A1C, Whole Blood: 8.8 % <H> [4.0 - 5.6] (04-30-19 @ 08:58)
PULMONARY PROGRESS NOTE    NATHAN MEEKS  MRN-[id number]    Patient is a 64y old  Female who presents with a chief complaint of chest pain (15 Jul 2019 07:37)      HPI:  no cough. sitting up in bed.   breathing is at baseline    ROS:   -    ACTIVE MEDICATION LIST:  MEDICATIONS  (STANDING):  ALBUTerol/ipratropium for Nebulization 3 milliLiter(s) Nebulizer every 6 hours  aspirin enteric coated 81 milliGRAM(s) Oral daily  atorvastatin 20 milliGRAM(s) Oral at bedtime  clopidogrel Tablet 75 milliGRAM(s) Oral daily  dextrose 5%. 1000 milliLiter(s) (50 mL/Hr) IV Continuous <Continuous>  dextrose 50% Injectable 12.5 Gram(s) IV Push once  dextrose 50% Injectable 25 Gram(s) IV Push once  dextrose 50% Injectable 25 Gram(s) IV Push once  docusate sodium 100 milliGRAM(s) Oral three times a day  heparin  Injectable 5000 Unit(s) SubCutaneous every 12 hours  hydrALAZINE 25 milliGRAM(s) Oral three times a day  insulin glargine Injectable (LANTUS) 7 Unit(s) SubCutaneous at bedtime  insulin lispro (HumaLOG) corrective regimen sliding scale   SubCutaneous three times a day before meals  insulin lispro (HumaLOG) corrective regimen sliding scale   SubCutaneous at bedtime  insulin lispro (HumaLOG) corrective regimen sliding scale   SubCutaneous <User Schedule>  insulin lispro Injectable (HumaLOG) 3 Unit(s) SubCutaneous at bedtime  insulin lispro Injectable (HumaLOG) 3 Unit(s) SubCutaneous before breakfast  insulin lispro Injectable (HumaLOG) 3 Unit(s) SubCutaneous before lunch  insulin lispro Injectable (HumaLOG) 4 Unit(s) SubCutaneous before dinner  lisinopril 40 milliGRAM(s) Oral daily  metoprolol succinate ER 50 milliGRAM(s) Oral daily  multivitamin 1 Tablet(s) Oral daily  pantoprazole    Tablet 40 milliGRAM(s) Oral before breakfast  sevelamer carbonate 800 milliGRAM(s) Oral three times a day with meals    MEDICATIONS  (PRN):  dextrose 40% Gel 15 Gram(s) Oral once PRN Blood Glucose LESS THAN 70 milliGRAM(s)/deciliter  glucagon  Injectable 1 milliGRAM(s) IntraMuscular once PRN Glucose LESS THAN 70 milligrams/deciliter  oxyCODONE    5 mG/acetaminophen 325 mG 1 Tablet(s) Oral every 6 hours PRN Moderate Pain (4 - 6)  senna 2 Tablet(s) Oral at bedtime PRN Constipation      EXAM:  Vital Signs Last 24 Hrs  T(C): 36.4 (15 Jul 2019 13:20), Max: 36.8 (14 Jul 2019 21:33)  T(F): 97.5 (15 Jul 2019 13:20), Max: 98.3 (14 Jul 2019 21:33)  HR: 79 (15 Jul 2019 13:20) (78 - 119)  BP: 140/76 (15 Jul 2019 13:20) (122/77 - 154/81)  BP(mean): --  RR: 17 (15 Jul 2019 13:20) (16 - 18)  SpO2: 99% (15 Jul 2019 13:20) (91% - 100%)    GENERAL: The patient is awake and alert in no apparent distress.     LUNGS: Clear to auscultation without wheezing, rales or rhonchi; respirations unlabored    HEART: Regular rate and rhythm without murmur.                            9.5    4.8   )-----------( 181      ( 14 Jul 2019 07:23 )             28.9       07-15    134<L>  |  88<L>  |  37<H>  ----------------------------<  327<H>  4.7   |  27  |  4.73<H>    Ca    9.4      15 Jul 2019 06:47  Phos  4.2     07-14  Mg     2.0     07-14      < from: Xray Chest 1 View AP/PA (07.10.19 @ 22:30) >    INTERPRETATION:  EXAMINATION: XR CHEST    CLINICAL INDICATION: chest pain    TECHNIQUE: Single portable view of the chest was obtained.    COMPARISON: AP view the chest acquired 6/15/2019.    FINDINGS:     The cardiomediastinal silhouette is not well evaluated in this projection   however it appears stable from prior.  No focal consolidations, pleural effusions or pneumothoraces.  Left-sided subclavianvascular stents are present.  Degenerative changes of the thoracic spine are present.    IMPRESSION:   No focal consolidations.                SREE PIERSON M.D., RADIOLOGY RESIDENT  This document has been electronically signed.  SAMY SILVA M.D., ATTENDING RADIOLOGIST  This document has been electronically signed. Jul 11 2019  9:38AM        < end of copied text >      PROBLEM LIST:  64y Female with HEALTH ISSUES - PROBLEM Dx:  Other hyperlipidemia: Other hyperlipidemia  Essential hypertension: Essential hypertension  Type 1 diabetes mellitus with hyperglycemia: Type 1 diabetes mellitus with hyperglycemia            RECS:  neb prn  stable pulm status    close f/u with Dr Thompson     Please call with any questions.    Chelle Kapoor DO  [facility] Pulmonary/Sleep Medicine  [phone]
Patient is a 63y old  Female who presents with a chief complaint of chest pain (12 Jul 2019 15:42)      SUBJECTIVE / OVERNIGHT EVENTS: feels better. No chest pain.  Review of Systems  chest pain no  palpitations no  sob no  nausea no  headache no    MEDICATIONS  (STANDING):  ALBUTerol/ipratropium for Nebulization 3 milliLiter(s) Nebulizer every 6 hours  aspirin enteric coated 81 milliGRAM(s) Oral daily  atorvastatin 20 milliGRAM(s) Oral at bedtime  clopidogrel Tablet 75 milliGRAM(s) Oral daily  dextrose 5%. 1000 milliLiter(s) (50 mL/Hr) IV Continuous <Continuous>  dextrose 50% Injectable 12.5 Gram(s) IV Push once  dextrose 50% Injectable 25 Gram(s) IV Push once  dextrose 50% Injectable 25 Gram(s) IV Push once  docusate sodium 100 milliGRAM(s) Oral three times a day  heparin  Injectable 5000 Unit(s) SubCutaneous every 12 hours  hydrALAZINE 25 milliGRAM(s) Oral three times a day  insulin glargine Injectable (LANTUS) 6 Unit(s) SubCutaneous at bedtime  insulin lispro (HumaLOG) corrective regimen sliding scale   SubCutaneous three times a day before meals  insulin lispro (HumaLOG) corrective regimen sliding scale   SubCutaneous at bedtime  insulin lispro (HumaLOG) corrective regimen sliding scale   SubCutaneous <User Schedule>  insulin lispro Injectable (HumaLOG) 2 Unit(s) SubCutaneous at bedtime  insulin lispro Injectable (HumaLOG) 3 Unit(s) SubCutaneous three times a day before meals  lisinopril 40 milliGRAM(s) Oral daily  metoprolol succinate ER 50 milliGRAM(s) Oral daily  multivitamin 1 Tablet(s) Oral daily  sevelamer carbonate 800 milliGRAM(s) Oral three times a day with meals    MEDICATIONS  (PRN):  dextrose 40% Gel 15 Gram(s) Oral once PRN Blood Glucose LESS THAN 70 milliGRAM(s)/deciliter  glucagon  Injectable 1 milliGRAM(s) IntraMuscular once PRN Glucose LESS THAN 70 milligrams/deciliter  oxyCODONE    5 mG/acetaminophen 325 mG 1 Tablet(s) Oral every 6 hours PRN Moderate Pain (4 - 6)  senna 2 Tablet(s) Oral at bedtime PRN Constipation      Vital Signs Last 24 Hrs  T(C): 36.9 (12 Jul 2019 16:10), Max: 36.9 (12 Jul 2019 16:10)  T(F): 98.4 (12 Jul 2019 16:10), Max: 98.4 (12 Jul 2019 16:10)  HR: 74 (12 Jul 2019 16:10) (74 - 79)  BP: 145/61 (12 Jul 2019 16:10) (120/55 - 151/68)  BP(mean): --  RR: 18 (12 Jul 2019 16:10) (17 - 18)  SpO2: 97% (12 Jul 2019 16:10) (92% - 98%)    PHYSICAL EXAM:  GENERAL: NAD  HEAD:  Atraumatic, Normocephalic  EYES: EOMI, PERRLA, conjunctiva and sclera clear  NECK: Supple, No JVD  CHEST/LUNG: Clear to auscultation bilaterally; No wheeze  HEART: Regular rate and rhythm; No murmurs, rubs, or gallops  ABDOMEN: Soft, Nontender, Nondistended; Bowel sounds present  EXTREMITIES:  2+ Peripheral Pulses, No clubbing, cyanosis, or edema  PSYCH: AAOx3  NEUROLOGY: non-focal  SKIN: No rashes or lesions    LABS:                        11.1   5.9   )-----------( 197      ( 12 Jul 2019 06:57 )             32.3     07-12    134<L>  |  92<L>  |  31<H>  ----------------------------<  355<H>  4.8   |  26  |  4.13<H>    Ca    9.7      12 Jul 2019 06:57  Mg     2.6     07-11    TPro  6.4  /  Alb  3.8  /  TBili  0.3  /  DBili  x   /  AST  26  /  ALT  17  /  AlkPhos  64  07-11    PT/INR - ( 10 Jul 2019 22:21 )   PT: 11.2 sec;   INR: 0.97 ratio         PTT - ( 10 Jul 2019 22:21 )  PTT:30.5 sec  CARDIAC MARKERS ( 11 Jul 2019 10:56 )  x     / x     / 302 U/L / x     / 6.9 ng/mL            RADIOLOGY & ADDITIONAL TESTS:    Imaging Personally Reviewed:    Consultant(s) Notes Reviewed:      Care Discussed with Consultants/Other Providers:
[facility]   [phone]  RENAL FOLLOW UP NOTE  --------------------------------------------------------------------------------  Chief Complaint:    24 hour events/subjective:    seen earlier. states is feeling better. still with c/o edema in leg.       PAST HISTORY  --------------------------------------------------------------------------------  No significant changes to PMH, PSH, FHx, SHx, unless otherwise noted    ALLERGIES & MEDICATIONS  --------------------------------------------------------------------------------  Allergies    No Known Allergies    Intolerances      Standing Inpatient Medications  ALBUTerol/ipratropium for Nebulization 3 milliLiter(s) Nebulizer every 6 hours  aspirin enteric coated 81 milliGRAM(s) Oral daily  atorvastatin 20 milliGRAM(s) Oral at bedtime  clopidogrel Tablet 75 milliGRAM(s) Oral daily  dextrose 5%. 1000 milliLiter(s) IV Continuous <Continuous>  dextrose 50% Injectable 12.5 Gram(s) IV Push once  dextrose 50% Injectable 25 Gram(s) IV Push once  dextrose 50% Injectable 25 Gram(s) IV Push once  docusate sodium 100 milliGRAM(s) Oral three times a day  heparin  Injectable 5000 Unit(s) SubCutaneous every 12 hours  hydrALAZINE 25 milliGRAM(s) Oral three times a day  insulin glargine Injectable (LANTUS) 6 Unit(s) SubCutaneous at bedtime  insulin lispro (HumaLOG) corrective regimen sliding scale   SubCutaneous three times a day before meals  insulin lispro (HumaLOG) corrective regimen sliding scale   SubCutaneous at bedtime  insulin lispro (HumaLOG) corrective regimen sliding scale   SubCutaneous <User Schedule>  insulin lispro Injectable (HumaLOG) 2 Unit(s) SubCutaneous at bedtime  insulin lispro Injectable (HumaLOG) 3 Unit(s) SubCutaneous three times a day before meals  lisinopril 40 milliGRAM(s) Oral daily  metoprolol succinate ER 50 milliGRAM(s) Oral daily  multivitamin 1 Tablet(s) Oral daily  pantoprazole    Tablet 40 milliGRAM(s) Oral before breakfast  sevelamer carbonate 800 milliGRAM(s) Oral three times a day with meals    PRN Inpatient Medications  dextrose 40% Gel 15 Gram(s) Oral once PRN  glucagon  Injectable 1 milliGRAM(s) IntraMuscular once PRN  oxyCODONE    5 mG/acetaminophen 325 mG 1 Tablet(s) Oral every 6 hours PRN  senna 2 Tablet(s) Oral at bedtime PRN      REVIEW OF SYSTEMS  --------------------------------------------------------------------------------    Gen: denies fevers/chills,  CVS: denies chest pain/palpitations  Resp: denies SOB/Cough  GI: Denies N/V/Abd pain  : Denies dysuria/oliguria/hematuria    All other systems were reviewed and are negative, except as noted.    VITALS/PHYSICAL EXAM  --------------------------------------------------------------------------------  T(C): 36.7 (07-12-19 @ 20:59), Max: 36.9 (07-12-19 @ 16:10)  HR: 81 (07-12-19 @ 20:59) (74 - 81)  BP: 128/75 (07-12-19 @ 20:59) (128/75 - 151/68)  RR: 18 (07-12-19 @ 20:59) (17 - 18)  SpO2: 96% (07-12-19 @ 20:59) (95% - 98%)  Wt(kg): --        07-11-19 @ 07:01  -  07-12-19 @ 07:00  --------------------------------------------------------  IN: 420 mL / OUT: 2500 mL / NET: -2080 mL    07-12-19 @ 07:01  -  07-12-19 @ 22:17  --------------------------------------------------------  IN: 660 mL / OUT: 2000 mL / NET: -1340 mL      Physical Exam:  	  Gen: chronic ill appearing   	no jvd ,  	Pulm: decrease bs  no rales or ronchi or wheezing  	CV: RRR, S1S2; no rub  	Abd: +BS, soft, nontender/nondistended  	: No suprapubic tenderness  	UE: Warm, no cyanosis  no clubbing,  no edema  	LE: Warm, no cyanosis  no clubbing, 2+  edema  	Neuro: alert and oriented. speech coherent   	Vascular access: + avf + bruit and thrill           LABS/STUDIES  --------------------------------------------------------------------------------              11.1   5.9   >-----------<  197      [07-12-19 @ 06:57]              32.3     134  |  92  |  31  ----------------------------<  355      [07-12-19 @ 06:57]  4.8   |  26  |  4.13        Ca     9.7     [07-12-19 @ 06:57]      Mg     2.6     [07-11-19 @ 07:12]    TPro  6.4  /  Alb  3.8  /  TBili  0.3  /  DBili  x   /  AST  26  /  ALT  17  /  AlkPhos  64  [07-11-19 @ 07:12]    PT/INR: PT 11.2 , INR 0.97       [07-10-19 @ 22:21]  PTT: 30.5       [07-10-19 @ 22:21]          [07-11-19 @ 10:56]    Creatinine Trend:  SCr 4.13 [07-12 @ 06:57]  SCr 5.71 [07-11 @ 07:12]  SCr 5.07 [07-10 @ 22:21]  SCr 5.65 [07-01 @ 22:39]  SCr 2.95 [06-21 @ 05:37]                  Iron 42, TIBC 253, %sat 17      [06-17-19 @ 17:54]  Ferritin 1838      [06-17-19 @ 18:06]  PTH -- (Ca 9.6)      [06-17-19 @ 18:06]   177  PTH -- (Ca 7.8)      [03-29-19 @ 20:38]   73  PTH -- (Ca 7.3)      [02-22-19 @ 02:49]   59  HbA1c 8.2      [06-16-19 @ 13:24]  TSH 0.71      [11-17-18 @ 08:25]
Patient is a 64y old  Female who presents with a chief complaint of chest pain (13 Jul 2019 11:50)      SUBJECTIVE / OVERNIGHT EVENTS: Comfortable without new complaints.   Review of Systems  chest pain no  palpitations no  sob improving   nausea no  headache no    MEDICATIONS  (STANDING):  ALBUTerol/ipratropium for Nebulization 3 milliLiter(s) Nebulizer every 6 hours  aspirin enteric coated 81 milliGRAM(s) Oral daily  atorvastatin 20 milliGRAM(s) Oral at bedtime  clopidogrel Tablet 75 milliGRAM(s) Oral daily  dextrose 5%. 1000 milliLiter(s) (50 mL/Hr) IV Continuous <Continuous>  dextrose 50% Injectable 12.5 Gram(s) IV Push once  dextrose 50% Injectable 25 Gram(s) IV Push once  dextrose 50% Injectable 25 Gram(s) IV Push once  docusate sodium 100 milliGRAM(s) Oral three times a day  heparin  Injectable 5000 Unit(s) SubCutaneous every 12 hours  hydrALAZINE 25 milliGRAM(s) Oral three times a day  insulin glargine Injectable (LANTUS) 7 Unit(s) SubCutaneous at bedtime  insulin lispro (HumaLOG) corrective regimen sliding scale   SubCutaneous three times a day before meals  insulin lispro (HumaLOG) corrective regimen sliding scale   SubCutaneous at bedtime  insulin lispro (HumaLOG) corrective regimen sliding scale   SubCutaneous <User Schedule>  insulin lispro Injectable (HumaLOG) 2 Unit(s) SubCutaneous at bedtime  insulin lispro Injectable (HumaLOG) 3 Unit(s) SubCutaneous three times a day before meals  lisinopril 40 milliGRAM(s) Oral daily  metoprolol succinate ER 50 milliGRAM(s) Oral daily  multivitamin 1 Tablet(s) Oral daily  pantoprazole    Tablet 40 milliGRAM(s) Oral before breakfast  sevelamer carbonate 800 milliGRAM(s) Oral three times a day with meals    MEDICATIONS  (PRN):  dextrose 40% Gel 15 Gram(s) Oral once PRN Blood Glucose LESS THAN 70 milliGRAM(s)/deciliter  glucagon  Injectable 1 milliGRAM(s) IntraMuscular once PRN Glucose LESS THAN 70 milligrams/deciliter  oxyCODONE    5 mG/acetaminophen 325 mG 1 Tablet(s) Oral every 6 hours PRN Moderate Pain (4 - 6)  senna 2 Tablet(s) Oral at bedtime PRN Constipation      Vital Signs Last 24 Hrs  T(C): 36.6 (13 Jul 2019 13:31), Max: 36.8 (13 Jul 2019 05:17)  T(F): 97.8 (13 Jul 2019 13:31), Max: 98.3 (13 Jul 2019 05:17)  HR: 77 (13 Jul 2019 13:31) (77 - 87)  BP: 129/60 (13 Jul 2019 13:31) (116/72 - 152/56)  BP(mean): --  RR: 18 (13 Jul 2019 13:31) (18 - 18)  SpO2: 98% (13 Jul 2019 13:31) (96% - 98%)    PHYSICAL EXAM:  GENERAL: NAD, frail  HEAD:  Atraumatic, Normocephalic  EYES: EOMI, PERRLA, conjunctiva and sclera clear  NECK: Supple, No JVD  CHEST/LUNG: Clear to auscultation bilaterally; No wheeze  HEART: Regular rate and rhythm; No murmurs, rubs, or gallops  ABDOMEN: Soft, Nontender, Nondistended; Bowel sounds present  EXTREMITIES:  2+bipedal edema  PSYCH: Anxious, forgetfull  NEUROLOGY: non-focal  SKIN: No rashes or lesions    LABS:                        11.1   5.9   )-----------( 197      ( 12 Jul 2019 06:57 )             32.3     07-13    138  |  93<L>  |  26<H>  ----------------------------<  316<H>  4.4   |  27  |  3.36<H>    Ca    9.0      13 Jul 2019 07:09                  RADIOLOGY & ADDITIONAL TESTS:    Imaging Personally Reviewed:    Consultant(s) Notes Reviewed:      Care Discussed with Consultants/Other Providers:

## 2019-07-15 NOTE — PROGRESS NOTE ADULT - ASSESSMENT
64 year old F with PMH uncontrolled DM1 with multiple complications and comorbidities including ESRD on HD, well known to endo team for frequent hospital admissions. Recent admission w/multi-focal pneumonia. Now readmitted for SOB/CP and fluid retention. Reports tolerating POs with glycemic control above goal while on present insulin doses due to erratic and increased PO intake. Pt wants to go home after HD. BG goal (100-200mg/dl). Pt is unable to control PO intake so need to continue to adjust insulin  doses ac and hs. Noted highest BG levels at hs and morning time from eating overnight. Will increase ac meals insulin doses accordingly but slowly due to pt's insulin sensitivity. 64 year old F with PMH uncontrolled DM1 with multiple complications and comorbidities including ESRD on HD, well known to endo team for frequent hospital admissions. Recent admission w/multi-focal pneumonia. Now readmitted for SOB/CP and fluid retention. Reports tolerating POs with glycemic control above goal while on present insulin doses due to erratic and increased PO intake. Pt wants to go home after HD. BG goal (100-200mg/dl). Pt is unable to control PO intake so need to continue to adjust insulin  doses ac and hs. Noted highest BG levels at hs and morning time from eating overnight. Will increase ac meals insulin doses accordingly but slowly due to pt's insulin sensitivity.  Per primary team pt going home today. Spoke to primary team NP about discharge recs

## 2019-07-15 NOTE — PROGRESS NOTE ADULT - PROBLEM SELECTOR PLAN 1
-test BG AC/HS/2AM  -c/w Lantus 7 units QHS  -Adjust Humalog 4-3-5 w/meals. C/w Humalog 3 units w/HS snack (if eating). Pt ate last night but didn't receive Humalog bedtime dose causing rebound hyperglycemia this am.  -c/w Humalog low correction scale AC and low HS/2AM scale  Plan to discharge on basal/bolus therapy. Doses TBD  -Pt to f/u with Dr Ley as out pt.   -Plan discussed with pt/team.  Contact info: [phone] (24/7). pager [phone] -test BG AC/HS/2AM  -c/w Lantus 7 units QHS  -Adjust Humalog 4-3-5 w/meals. C/w Humalog 3 units w/HS snack (if eating). Pt ate last night but didn't receive Humalog bedtime dose causing rebound hyperglycemia this am.  -c/w Humalog low correction scale AC and low HS/2AM scale  Plan to discharge on basal/bolus therapy. Lantus 7 units q hs. If FBG <100s decrease dose to 5 units. Humalog 4 units ac meals. Pt aware of the need to adjust according to PO intake. Pt is unable to do this independently but  and daughter manage DM at home.   -Pt to f/u with Dr Ley as out pt.   -Plan discussed with pt/team NP.  Contact info: [phone] (24/7). pager [phone]

## 2019-07-15 NOTE — PROGRESS NOTE ADULT - PROBLEM SELECTOR PROBLEM 1
Type 1 diabetes mellitus with hyperglycemia

## 2019-07-18 NOTE — DISCHARGE NOTE PROVIDER - HOSPITAL COURSE
64 year old F with PMH uncontrolled DM1 with multiple complications and comorbidities including ESRD on HD, well known to endo team for frequent hospital admissions. Recent admission w/multi-focal pneumonia. Now readmitted for SOB/CP and fluid retention. 64 Female hx CAD (Cath Feb '19 showing 99% RCA, 100% RPLS, 100% Cx) s/p multiple stents/brachytherapy, ESRD (on HD M/T/T/Sa), DM1 c/b neuropathy and retinopathy, CHF, mod MR, severe AS, RHF, TIA, severe PVD s/p b/l fempop bypass, left subclavian vein stenosis s/p stent, COPD not on O2, presenting with chest pain x 1 hour started while watching TV suddenly substernal non radiating feels similar to prior cardiac events; pain is constant and present in the room. Also endorses worsening leg swelling x several days and leg pain.. 64 Female hx CAD (Cath Feb '19 showing 99% RCA, 100% RPLS, 100% Cx) s/p multiple stents/brachytherapy, ESRD (on HD M/T/T/Sa), DM1 c/b neuropathy and retinopathy, CHF, mod MR, severe AS, RHF, TIA, severe PVD s/p b/l fempop bypass, left subclavian vein stenosis s/p stent, COPD not on O2, presenting with chest pain x 1 hour started while watching TV suddenly substernal non radiating feels similar to prior cardiac events; pain is constant and present in the room. Also endorses worsening leg swelling x several days and leg pain. 64 Female hx CAD (Cath Feb '19 showing 99% RCA, 100% RPLS, 100% Cx) s/p multiple stents/brachytherapy, ESRD (on HD M/T/T/Sa), DM1 c/b neuropathy and retinopathy, CHF, mod MR, severe AS, RHF, TIA, severe PVD s/p b/l fempop bypass, left subclavian vein stenosis s/p stent, COPD not on O2, presenting with chest pain x 1 hour started while watching TV suddenly substernal non radiating feels similar to prior cardiac events; pain is constant and present in the room. Also endorses worsening leg swelling x several days and leg pain.    CT Abdominal CT A/P - New left lower lobe and lingular lung consolidations and patchy nodular     airspace opacities in the right lower lobe concerning for multifocal     pneumonia. intrahepatic and extrahepatic biliary ductal dilatation, unchanged.    Chest Xray - clear 64 Female hx CAD (Cath Feb '19 showing 99% RCA, 100% RPLS, 100% Cx) s/p multiple stents/brachytherapy, ESRD (on HD M/T/T/Sa), DM1 c/b neuropathy and retinopathy, CHF, mod MR, severe AS, RHF, TIA, severe PVD s/p b/l fempop bypass, left subclavian vein stenosis s/p stent, COPD not on O2, presenting with chest pain x 1 hour started while watching TV suddenly substernal non radiating feels similar to prior cardiac events; pain is constant and present in the room. Also endorses worsening leg swelling x several days and leg pain.    CT Abdominal CT A/P - New left lower lobe and lingular lung consolidations and patchy nodular     airspace opacities in the right lower lobe concerning for multifocal     pneumonia. intrahepatic and extrahepatic biliary ductal dilatation, unchanged.    Chest Xray - clear     Please medically Clear for discharge     Please to follow p with Nephrology / Cardiology / Endocrinology / pulmonary / dental outpatient 64 Female hx CAD (Cath Feb '19 showing 99% RCA, 100% RPLS, 100% Cx) s/p multiple stents/brachytherapy, ESRD (on HD M/T/T/Sa), DM1 c/b neuropathy and retinopathy, CHF, mod MR, severe AS, RHF, TIA, severe PVD s/p b/l fempop bypass, left subclavian vein stenosis s/p stent, COPD not on O2, presenting with chest pain x 1 hour started while watching TV suddenly substernal non radiating feels similar to prior cardiac events; pain is constant and present in the room. Also endorses worsening leg swelling x several days and leg pain. Diuresis  with hemodialysis     Chest pain n    CT Abdominal CT A/P - New left lower lobe and lingular lung consolidations and patchy nodular     airspace opacities in the right lower lobe concerning for multifocal     pneumonia. intrahepatic and extrahepatic biliary ductal dilatation, unchanged.    Chest Xray - clear     Please medically Clear for discharge     Please to follow p with Nephrology / Cardiology / Endocrinology / pulmonary / dental outpatient negative...

## 2019-07-26 ENCOUNTER — APPOINTMENT (OUTPATIENT)
Dept: PULMONOLOGY | Facility: CLINIC | Age: 64
End: 2019-07-26
Payer: MEDICARE

## 2019-07-26 VITALS
HEART RATE: 72 BPM | TEMPERATURE: 98.2 F | RESPIRATION RATE: 16 BRPM | DIASTOLIC BLOOD PRESSURE: 68 MMHG | HEIGHT: 64 IN | OXYGEN SATURATION: 99 % | WEIGHT: 112 LBS | SYSTOLIC BLOOD PRESSURE: 113 MMHG | BODY MASS INDEX: 19.12 KG/M2

## 2019-07-26 PROCEDURE — 94729 DIFFUSING CAPACITY: CPT

## 2019-07-26 PROCEDURE — 94727 GAS DIL/WSHOT DETER LNG VOL: CPT

## 2019-07-26 PROCEDURE — 71046 X-RAY EXAM CHEST 2 VIEWS: CPT

## 2019-07-26 PROCEDURE — 99213 OFFICE O/P EST LOW 20 MIN: CPT | Mod: 25

## 2019-07-26 PROCEDURE — 94010 BREATHING CAPACITY TEST: CPT

## 2019-07-26 NOTE — HISTORY OF PRESENT ILLNESS
[Difficulty Breathing During Exertion] : stable dyspnea on exertion [None] : ~He/She~ has no significant interval events [Wheezing] : denies wheezing [Feelings Of Weakness On Exertion] : stable exercise intolerance [Cough] : denies coughing [Fever] : denies fever [Chest Pain Or Discomfort] : denies chest pain [Regional Soft Tissue Swelling Both Lower Extremities] : stable lower extremity edema [Former] : is a former smoker [___ Year Quit] : ~He/She~ quit smoking in [unfilled] [Wt Gain ___ Lbs] : no recent weight gain [Wt Loss ___ Lbs] : no recent weight loss [Oxygen] : the patient uses no supplemental oxygen [FreeTextEntry1] : hx E bus and mediastinoscopy cardiothoracic surgery to rule out lymphoma, rule out lung cancer with non diagnostic data\par History severe CHF\par Renal failure on hemodialysis\par Diabetes mellitus with history of recurrent episodes of DKA requiring hospitalization\par ASD\par Atherosclerotic heart disease \par pleural effusions secondary to congestive heart failure cardiac disease with fluid overload\par COPD\par Psychiatric disorder\par s/p hospital admission with LLL pneumonia and completed antibx\par  No sputum fever or worsening \par  neg Chest pain

## 2019-07-26 NOTE — PHYSICAL EXAM
[General Appearance - Well Developed] : well developed [Normal Appearance] : normal appearance [Well Groomed] : well groomed [General Appearance - Well Nourished] : well nourished [General Appearance - In No Acute Distress] : no acute distress [No Deformities] : no deformities [Normal Conjunctiva] : the conjunctiva exhibited no abnormalities [I] : I [Normal Oropharynx] : normal oropharynx [Neck Appearance] : the appearance of the neck was normal [Neck Cervical Mass (___cm)] : no neck mass was observed [Jugular Venous Distention Increased] : there was no jugular-venous distention [Thyroid Diffuse Enlargement] : the thyroid was not enlarged [Heart Rate And Rhythm] : heart rate and rhythm were normal [Murmurs] : no murmurs present [Heart Sounds] : normal S1 and S2 [Respiration, Rhythm And Depth] : normal respiratory rhythm and effort [Exaggerated Use Of Accessory Muscles For Inspiration] : no accessory muscle use [Chest Palpation] : palpation of the chest revealed no abnormalities [Lungs Percussion] : the lungs were normal to percussion [Bowel Sounds] : normal bowel sounds [Abdomen Soft] : soft [Abdomen Tenderness] : non-tender [Abdomen Mass (___ Cm)] : no abdominal mass palpated [Nail Clubbing] : no clubbing of the fingernails [Cyanosis, Localized] : no localized cyanosis [Petechial Hemorrhages (___cm)] : no petechial hemorrhages [] : no ischemic changes [Deep Tendon Reflexes (DTR)] : deep tendon reflexes were 2+ and symmetric [Sensation] : the sensory exam was normal to light touch and pinprick [No Focal Deficits] : no focal deficits [Oriented To Time, Place, And Person] : oriented to person, place, and time [FreeTextEntry1] : 2/6 Murmur

## 2019-07-26 NOTE — PROCEDURE
[FreeTextEntry1] : Chest x-ray PA lateral July 26, 2019\par Cardiomegaly\par No parenchymal infiltrates identified\par Rib fracture right mid upper lung zone identified\par No dominant pulmonary nodules\par No pleural effusions or pneumothorax\par No evidence of an active pneumonia\par PFT without BD\par July 26 2019\par  Moderate reduction flow rates\par  Air trapping Spirometry 5/31 2019\par moderate OAD\par severe reduction DLCO with loss fx alveolar capillary units\par Noted compared to 1 mos ago stable flows\par \par Chest x-ray 10 2819 status post left lower lobe pneumonia\par Borderline cardiomegaly\par Fracture rib right upper lung zone with healing\par And a left lower lobe pneumonia [facility]\par Negative for pleural effusion\par Soft tissue and bony structures with thinning of the vertebral spine\par \par PFT January 11, 2019\par Moderate reduction in flow rates with an obstructive impairment\par Lung volumes consistent with air trapping with RV/TLC ratio 152% of predicted.\par Diffusion 68% of predicted with a loss of mild functioning alveolar capillary units\par Chronically low hemoglobin noted today at 8.9

## 2019-07-26 NOTE — REVIEW OF SYSTEMS
[As Noted in HPI] : as noted in HPI [Edema] : ~T edema was present [Claudication] : intermittent claudication [Diabetes] : diabetes mellitus [Negative] : Neurologic [Chest Discomfort] : no chest discomfort [PND] : no PND [FreeTextEntry8] : POST EBUS with Med Bx negative flow cytology/ LN pathology Reactive  [Orthopnea] : no orthopnea [de-identified] : s/p DKA hospital admission [FreeTextEntry9] : CHF ASHD

## 2019-07-26 NOTE — DISCUSSION/SUMMARY
[FreeTextEntry1] : COPD stable flow rates\par Congestive heart failure no evidence radiographically\par Renal failure on dialysis\par s/p pneumonia\par Diabetes mellitus with history DKA\par \par March 6, 2019\par Addendum\par Underwent bronchoscopy E bus mediastinoscopy\par AFB tissue culture negative\par Flow cytometry no diagnostic abnormalities\par CT CHEST\par Small possibly partially loculated right pleural effusion with likely associated passive atelectasis\par Resolution of prior reported bilateral increased reticular opacities and more confluent right-sided opacities felt a reflection of improvement of pulmonary edema\par Residual increased reticular opacities at the right base possibly pulmonary vascular congestion or subsegmental atelectasis\par \par Post cardiothoracic surgery evaluation\par As noted pathology post E bus hysteroscopy March 14, 2019 was negative for tumor\par CT chest done while hospitalized for a fall at [employer] on April 18, 2019 findings suggested mild pulmonary edema\par Decrease in size of the mediastinal adenopathy compared to prior exam\par Agree with cardiothoracic surgery 3-month follow-up CAT scan of the chest and delay due to priot noued interval improvement

## 2019-07-29 ENCOUNTER — INPATIENT (INPATIENT)
Facility: HOSPITAL | Age: 64
LOS: 9 days | Discharge: HOME | DRG: 280 | End: 2019-08-08
Attending: INTERNAL MEDICINE | Admitting: INTERNAL MEDICINE
Payer: MEDICARE

## 2019-07-29 VITALS
OXYGEN SATURATION: 98 % | DIASTOLIC BLOOD PRESSURE: 78 MMHG | WEIGHT: 149.91 LBS | RESPIRATION RATE: 16 BRPM | TEMPERATURE: 98 F | SYSTOLIC BLOOD PRESSURE: 120 MMHG | HEART RATE: 100 BPM

## 2019-07-29 DIAGNOSIS — E87.70 FLUID OVERLOAD, UNSPECIFIED: ICD-10-CM

## 2019-07-29 DIAGNOSIS — Z98.89 OTHER SPECIFIED POSTPROCEDURAL STATES: Chronic | ICD-10-CM

## 2019-07-29 LAB
ALBUMIN SERPL ELPH-MCNC: 3.9 G/DL — SIGNIFICANT CHANGE UP (ref 3.3–5)
ALP SERPL-CCNC: 66 U/L — SIGNIFICANT CHANGE UP (ref 40–120)
ALT FLD-CCNC: 22 U/L — SIGNIFICANT CHANGE UP (ref 10–45)
ANION GAP SERPL CALC-SCNC: 20 MMOL/L — HIGH (ref 5–17)
APTT BLD: 22.8 SEC — LOW (ref 27.5–36.3)
AST SERPL-CCNC: 32 U/L — SIGNIFICANT CHANGE UP (ref 10–40)
BASOPHILS # BLD AUTO: 0 K/UL — SIGNIFICANT CHANGE UP (ref 0–0.2)
BASOPHILS NFR BLD AUTO: 0.4 % — SIGNIFICANT CHANGE UP (ref 0–2)
BILIRUB SERPL-MCNC: 0.4 MG/DL — SIGNIFICANT CHANGE UP (ref 0.2–1.2)
BUN SERPL-MCNC: 47 MG/DL — HIGH (ref 7–23)
CALCIUM SERPL-MCNC: 9.5 MG/DL — SIGNIFICANT CHANGE UP (ref 8.4–10.5)
CHLORIDE SERPL-SCNC: 90 MMOL/L — LOW (ref 96–108)
CO2 SERPL-SCNC: 25 MMOL/L — SIGNIFICANT CHANGE UP (ref 22–31)
CREAT SERPL-MCNC: 6.22 MG/DL — HIGH (ref 0.5–1.3)
EOSINOPHIL # BLD AUTO: 0.1 K/UL — SIGNIFICANT CHANGE UP (ref 0–0.5)
EOSINOPHIL NFR BLD AUTO: 2.2 % — SIGNIFICANT CHANGE UP (ref 0–6)
GAS PNL BLDV: SIGNIFICANT CHANGE UP
GLUCOSE SERPL-MCNC: 199 MG/DL — HIGH (ref 70–99)
HCT VFR BLD CALC: 33.6 % — LOW (ref 34.5–45)
HGB BLD-MCNC: 11.2 G/DL — LOW (ref 11.5–15.5)
INR BLD: 1 RATIO — SIGNIFICANT CHANGE UP (ref 0.88–1.16)
LIDOCAIN IGE QN: 24 U/L — SIGNIFICANT CHANGE UP (ref 7–60)
LYMPHOCYTES # BLD AUTO: 0.8 K/UL — LOW (ref 1–3.3)
LYMPHOCYTES # BLD AUTO: 15.5 % — SIGNIFICANT CHANGE UP (ref 13–44)
MCHC RBC-ENTMCNC: 33.2 GM/DL — SIGNIFICANT CHANGE UP (ref 32–36)
MCHC RBC-ENTMCNC: 35.2 PG — HIGH (ref 27–34)
MCV RBC AUTO: 106 FL — HIGH (ref 80–100)
MONOCYTES # BLD AUTO: 0.7 K/UL — SIGNIFICANT CHANGE UP (ref 0–0.9)
MONOCYTES NFR BLD AUTO: 13.1 % — SIGNIFICANT CHANGE UP (ref 2–14)
NEUTROPHILS # BLD AUTO: 3.7 K/UL — SIGNIFICANT CHANGE UP (ref 1.8–7.4)
NEUTROPHILS NFR BLD AUTO: 68.8 % — SIGNIFICANT CHANGE UP (ref 43–77)
PLATELET # BLD AUTO: 235 K/UL — SIGNIFICANT CHANGE UP (ref 150–400)
POTASSIUM SERPL-MCNC: 5.7 MMOL/L — HIGH (ref 3.5–5.3)
POTASSIUM SERPL-SCNC: 5.7 MMOL/L — HIGH (ref 3.5–5.3)
PROT SERPL-MCNC: 6.7 G/DL — SIGNIFICANT CHANGE UP (ref 6–8.3)
PROTHROM AB SERPL-ACNC: 11.4 SEC — SIGNIFICANT CHANGE UP (ref 10–12.9)
RBC # BLD: 3.17 M/UL — LOW (ref 3.8–5.2)
RBC # FLD: 13.1 % — SIGNIFICANT CHANGE UP (ref 10.3–14.5)
SODIUM SERPL-SCNC: 135 MMOL/L — SIGNIFICANT CHANGE UP (ref 135–145)
WBC # BLD: 5.3 K/UL — SIGNIFICANT CHANGE UP (ref 3.8–10.5)
WBC # FLD AUTO: 5.3 K/UL — SIGNIFICANT CHANGE UP (ref 3.8–10.5)

## 2019-07-29 PROCEDURE — 71046 X-RAY EXAM CHEST 2 VIEWS: CPT | Mod: 26

## 2019-07-29 PROCEDURE — 93010 ELECTROCARDIOGRAM REPORT: CPT | Mod: GC

## 2019-07-29 PROCEDURE — 99285 EMERGENCY DEPT VISIT HI MDM: CPT | Mod: GC

## 2019-07-29 PROCEDURE — 73060 X-RAY EXAM OF HUMERUS: CPT | Mod: 26,RT

## 2019-07-29 PROCEDURE — 74177 CT ABD & PELVIS W/CONTRAST: CPT | Mod: 26

## 2019-07-29 RX ORDER — INSULIN LISPRO 100/ML
VIAL (ML) SUBCUTANEOUS
Refills: 0 | Status: DISCONTINUED | OUTPATIENT
Start: 2019-07-29 | End: 2019-07-30

## 2019-07-29 RX ORDER — INSULIN GLARGINE 100 [IU]/ML
5 INJECTION, SOLUTION SUBCUTANEOUS AT BEDTIME
Refills: 0 | Status: DISCONTINUED | OUTPATIENT
Start: 2019-07-29 | End: 2019-07-30

## 2019-07-29 RX ORDER — GLUCAGON INJECTION, SOLUTION 0.5 MG/.1ML
1 INJECTION, SOLUTION SUBCUTANEOUS ONCE
Refills: 0 | Status: DISCONTINUED | OUTPATIENT
Start: 2019-07-29 | End: 2019-08-08

## 2019-07-29 RX ORDER — ATORVASTATIN CALCIUM 80 MG/1
20 TABLET, FILM COATED ORAL AT BEDTIME
Refills: 0 | Status: DISCONTINUED | OUTPATIENT
Start: 2019-07-29 | End: 2019-08-08

## 2019-07-29 RX ORDER — IPRATROPIUM/ALBUTEROL SULFATE 18-103MCG
3 AEROSOL WITH ADAPTER (GRAM) INHALATION EVERY 6 HOURS
Refills: 0 | Status: DISCONTINUED | OUTPATIENT
Start: 2019-07-29 | End: 2019-08-08

## 2019-07-29 RX ORDER — DEXTROSE 50 % IN WATER 50 %
15 SYRINGE (ML) INTRAVENOUS ONCE
Refills: 0 | Status: DISCONTINUED | OUTPATIENT
Start: 2019-07-29 | End: 2019-08-08

## 2019-07-29 RX ORDER — SEVELAMER CARBONATE 2400 MG/1
800 POWDER, FOR SUSPENSION ORAL
Refills: 0 | Status: DISCONTINUED | OUTPATIENT
Start: 2019-07-29 | End: 2019-08-01

## 2019-07-29 RX ORDER — INSULIN LISPRO 100/ML
VIAL (ML) SUBCUTANEOUS AT BEDTIME
Refills: 0 | Status: DISCONTINUED | OUTPATIENT
Start: 2019-07-29 | End: 2019-07-30

## 2019-07-29 RX ORDER — METOPROLOL TARTRATE 50 MG
50 TABLET ORAL DAILY
Refills: 0 | Status: DISCONTINUED | OUTPATIENT
Start: 2019-07-29 | End: 2019-08-01

## 2019-07-29 RX ORDER — LISINOPRIL 2.5 MG/1
40 TABLET ORAL DAILY
Refills: 0 | Status: DISCONTINUED | OUTPATIENT
Start: 2019-07-29 | End: 2019-08-02

## 2019-07-29 RX ORDER — CLOPIDOGREL BISULFATE 75 MG/1
75 TABLET, FILM COATED ORAL DAILY
Refills: 0 | Status: DISCONTINUED | OUTPATIENT
Start: 2019-07-29 | End: 2019-08-08

## 2019-07-29 RX ORDER — SENNA PLUS 8.6 MG/1
2 TABLET ORAL AT BEDTIME
Refills: 0 | Status: DISCONTINUED | OUTPATIENT
Start: 2019-07-29 | End: 2019-08-08

## 2019-07-29 RX ORDER — ACETAMINOPHEN 500 MG
650 TABLET ORAL EVERY 6 HOURS
Refills: 0 | Status: DISCONTINUED | OUTPATIENT
Start: 2019-07-29 | End: 2019-08-08

## 2019-07-29 RX ORDER — INSULIN LISPRO 100/ML
4 VIAL (ML) SUBCUTANEOUS
Refills: 0 | Status: DISCONTINUED | OUTPATIENT
Start: 2019-07-29 | End: 2019-07-30

## 2019-07-29 RX ORDER — ASPIRIN/CALCIUM CARB/MAGNESIUM 324 MG
81 TABLET ORAL DAILY
Refills: 0 | Status: DISCONTINUED | OUTPATIENT
Start: 2019-07-29 | End: 2019-08-08

## 2019-07-29 RX ORDER — DEXTROSE 50 % IN WATER 50 %
25 SYRINGE (ML) INTRAVENOUS ONCE
Refills: 0 | Status: DISCONTINUED | OUTPATIENT
Start: 2019-07-29 | End: 2019-08-08

## 2019-07-29 RX ORDER — OXYCODONE AND ACETAMINOPHEN 5; 325 MG/1; MG/1
2 TABLET ORAL EVERY 6 HOURS
Refills: 0 | Status: DISCONTINUED | OUTPATIENT
Start: 2019-07-29 | End: 2019-08-05

## 2019-07-29 RX ORDER — HEPARIN SODIUM 5000 [USP'U]/ML
5000 INJECTION INTRAVENOUS; SUBCUTANEOUS EVERY 12 HOURS
Refills: 0 | Status: DISCONTINUED | OUTPATIENT
Start: 2019-07-29 | End: 2019-08-08

## 2019-07-29 RX ORDER — DEXTROSE 50 % IN WATER 50 %
12.5 SYRINGE (ML) INTRAVENOUS ONCE
Refills: 0 | Status: DISCONTINUED | OUTPATIENT
Start: 2019-07-29 | End: 2019-08-08

## 2019-07-29 RX ORDER — DOCUSATE SODIUM 100 MG
100 CAPSULE ORAL THREE TIMES A DAY
Refills: 0 | Status: DISCONTINUED | OUTPATIENT
Start: 2019-07-29 | End: 2019-08-08

## 2019-07-29 RX ORDER — SODIUM CHLORIDE 9 MG/ML
1000 INJECTION, SOLUTION INTRAVENOUS
Refills: 0 | Status: DISCONTINUED | OUTPATIENT
Start: 2019-07-29 | End: 2019-08-08

## 2019-07-29 RX ORDER — PANTOPRAZOLE SODIUM 20 MG/1
40 TABLET, DELAYED RELEASE ORAL
Refills: 0 | Status: DISCONTINUED | OUTPATIENT
Start: 2019-07-29 | End: 2019-08-08

## 2019-07-29 RX ORDER — HYDRALAZINE HCL 50 MG
25 TABLET ORAL THREE TIMES A DAY
Refills: 0 | Status: DISCONTINUED | OUTPATIENT
Start: 2019-07-29 | End: 2019-08-08

## 2019-07-29 NOTE — ED PROVIDER NOTE - CLINICAL SUMMARY MEDICAL DECISION MAKING FREE TEXT BOX
64 Female hx CAD (Cath Feb '19 showing 99% RCA, 100% RPLS, 100% Cx) s/p multiple stents/brachytherapy, ESRD (on HD M/T/T/Sa), DM1 c/b neuropathy and retinopathy, CHF, mod MR, severe AS, RHF, TIA, severe PVD s/p b/l fempop bypass, left subclavian vein stenosis s/p stent, COPD not on O2 pw cc of LE swelling. Likely fluid overload, will check labs and likely admit for dialysis today

## 2019-07-29 NOTE — ED PROVIDER NOTE - CARE PLAN
Principal Discharge DX:	Hypervolemia, unspecified hypervolemia type  Secondary Diagnosis:	ESRD (end stage renal disease) on dialysis

## 2019-07-29 NOTE — H&P ADULT - NSHPPHYSICALEXAM_GEN_ALL_CORE
PHYSICAL EXAMINATION:  Vital Signs Last 24 Hrs  T(C): 36.5 (29 Jul 2019 18:40), Max: 36.7 (29 Jul 2019 16:00)  T(F): 97.7 (29 Jul 2019 18:40), Max: 98 (29 Jul 2019 16:00)  HR: 98 (29 Jul 2019 18:40) (78 - 100)  BP: 146/76 (29 Jul 2019 18:40) (120/78 - 146/76)  BP(mean): --  RR: 20 (29 Jul 2019 18:40) (16 - 20)  SpO2: 99% (29 Jul 2019 18:40) (97% - 99%)  CAPILLARY BLOOD GLUCOSE          GENERAL: NAD, well-groomed, well-developed  HEAD:  atraumatic, normocephalic  EYES: sclera anicteric  ENMT: mucous membranes moist  NECK: supple, No JVD  CHEST/LUNG: few rales to auscultation bilaterally; rhonchi, or wheezing b/l  HEART: normal S1, S2  ABDOMEN: BS+, soft, ND, NT   EXTREMITIES:  2+ edema b/l LEs  NEURO: awake, alert, interactive; moves all extremities  SKIN: no rashes or lesions

## 2019-07-29 NOTE — ED PROVIDER NOTE - ATTENDING CONTRIBUTION TO CARE
64F, pmh cad, s/p multiple stents, esrd tts dialysis (and monday when needed), dm1, chf, severe AS, severe PVD, copd, presents with increasing LE edema x a few days. no sob diff from baseline. no cp. does have some diffuse abd pain/bloating, no clear provoking/alleviating factors. no n/v/d, no exacerbating or alleviating factors, no change in appetite.    PE: NAD, NCAT, MMM, Trachea midline, Normal conjunctiva, lungs CTAB, S1/S2 RRR, Normal perfusion, 2+ radial pulses bilat, Abdomen Soft, Non-distended, Diffusetly ttp, No rebound/guarding, 3+ bilat LE edema, No deformity of extremities, No rashes,  No focal motor or sensory deficits.     VS without sig abnormality. Pt in no distress, does have evidence of volume overload. EKG unchanged from previous. Plan to obtain labs, ct a/p eval for acute intra-abd pathology. Pt will require admission. - Jose Hinton MD

## 2019-07-29 NOTE — ED ADULT NURSE REASSESSMENT NOTE - NS ED NURSE REASSESS COMMENT FT1
Report given to Dialysis RN Zachariah, pt to go up off tele as per MD Lopes. Pt awaiting Transport to Dialysis.

## 2019-07-29 NOTE — CONSULT NOTE ADULT - SUBJECTIVE AND OBJECTIVE BOX
[facility]  [phone]  NEPHROLOGY      INITIAL CONSULT NOTE  --------------------------------------------------------------------------------  HPI:      64 Female hx CAD (Cath Feb '19 showing 99% RCA, 100% RPLS, 100% Cx) s/p multiple stents/brachytherapy, ESRD (on HD M/T/T/Sa), DM1 c/b neuropathy and retinopathy, CHF, mod MR, severe AS, RHF, TIA, severe PVD s/p b/l fempop bypass, left subclavian vein stenosis s/p stent, COPD not on O2 pw cc of LE swelling  	Last dialysis full session on saturday after which she felt more bloated then usual. Amount of swelling increased today to the point her legs were "going to explode". No new pain. No new SOB. No new CP.  renal consult called.         PAST HISTORY  --------------------------------------------------------------------------------  PAST MEDICAL & SURGICAL HISTORY:  COPD (chronic obstructive pulmonary disease)  Localized enlarged lymph nodes  CHF (congestive heart failure): EF 40-45%  Subclavian vein stenosis, left: s/p stent  DKA, type 1: 1/2015  ACS (acute coronary syndrome): 1/2015 - cath revealed 100% ostial stenosis not amenable to PCI - medical management  TIA (transient ischemic attack): x 2 - 8-9 years ago prior to ASD/VSD repair  CAD (coronary artery disease): s/p stents  Gout: past  CVA (cerebral infarction): with no residual, 8 yrs ago, prior to heart surgery - ST memory loss  Peripheral vascular disease: occluded left fem-pop bypass 5/2015  Diabetes mellitus type 1: Insulin Dependent -  ESRD (end stage renal disease): dialysis  M, tue, thursday, saturday  Hyperlipidemia  Status post device closure of ASD: &quot;shantel&quot;  History of cardiac catheterization: 1/2015 - no intervention  S/P femoral-popliteal bypass surgery: L and R in 2013 with graft; 5/2015 CFA angioplasty left and ileofemoral endarterectomywith vein patch angioplasty of left fem-pop bypass graft  Multiple vascular surgery both leg, left fempop bypass revision 11/2015  AV (arteriovenous fistula): Left AV graft; revision with stent placement 2-3 years ago  S/P cholecystectomy    FAMILY HISTORY:  Family history of smoking  Family history of hypertension  Family history of cancer (Sibling)    PAST SOCIAL HISTORY: + former tobacco use     ALLERGIES & MEDICATIONS  --------------------------------------------------------------------------------  Allergies    No Known Allergies    Intolerances      Standing Inpatient Medications  ALBUTerol/ipratropium for Nebulization 3 milliLiter(s) Nebulizer every 6 hours  aspirin enteric coated 81 milliGRAM(s) Oral daily  atorvastatin 20 milliGRAM(s) Oral at bedtime  clopidogrel Tablet 75 milliGRAM(s) Oral daily  dextrose 5%. 1000 milliLiter(s) IV Continuous <Continuous>  dextrose 50% Injectable 12.5 Gram(s) IV Push once  dextrose 50% Injectable 25 Gram(s) IV Push once  dextrose 50% Injectable 25 Gram(s) IV Push once  docusate sodium 100 milliGRAM(s) Oral three times a day  heparin  Injectable 5000 Unit(s) SubCutaneous every 12 hours  hydrALAZINE 25 milliGRAM(s) Oral three times a day  insulin glargine Injectable (LANTUS) 5 Unit(s) SubCutaneous at bedtime  insulin lispro (HumaLOG) corrective regimen sliding scale   SubCutaneous three times a day before meals  insulin lispro (HumaLOG) corrective regimen sliding scale   SubCutaneous at bedtime  insulin lispro Injectable (HumaLOG) 4 Unit(s) SubCutaneous before breakfast  insulin lispro Injectable (HumaLOG) 4 Unit(s) SubCutaneous before lunch  insulin lispro Injectable (HumaLOG) 4 Unit(s) SubCutaneous before dinner  lisinopril 40 milliGRAM(s) Oral daily  metoprolol succinate ER 50 milliGRAM(s) Oral daily  multivitamin 1 Tablet(s) Oral daily  pantoprazole    Tablet 40 milliGRAM(s) Oral before breakfast  sevelamer carbonate 800 milliGRAM(s) Oral three times a day with meals    PRN Inpatient Medications  acetaminophen   Tablet .. 650 milliGRAM(s) Oral every 6 hours PRN  dextrose 40% Gel 15 Gram(s) Oral once PRN  glucagon  Injectable 1 milliGRAM(s) IntraMuscular once PRN  oxyCODONE    5 mG/acetaminophen 325 mG 2 Tablet(s) Oral every 6 hours PRN  senna 2 Tablet(s) Oral at bedtime PRN      REVIEW OF SYSTEMS  --------------------------------------------------------------------------------  Gen: No  fevers/chills   Skin: No rashes  Head/Eyes/Ears/Mouth: No headache; Normal hearing;  No sinus pain/discomfort, sore throat  Respiratory: +  dyspnea, cough, wheezing, hemoptysis  CV: No chest pain, orthopnea +   GI: No abdominal pain, diarrhea, nausea, vomiting, melena  : No dysuria  MSK: + Left leg and RUE pain  no back pain  Neuro: No dizziness/lightheadedness  also with +  edema     All other systems were reviewed and are negative, except as noted.    VITALS/PHYSICAL EXAM  --------------------------------------------------------------------------------  T(C): 36.6 (07-29-19 @ 20:15), Max: 36.7 (07-29-19 @ 16:00)  HR: 103 (07-29-19 @ 20:15) (78 - 103)  BP: 145/86 (07-29-19 @ 20:15) (120/78 - 146/76)  RR: 18 (07-29-19 @ 20:15) (16 - 20)  SpO2: 98% (07-29-19 @ 20:15) (97% - 99%)  Wt(kg): --  Height (cm): 160 (07-29-19 @ 18:44)  Weight (kg): 68 (07-29-19 @ 16:00)  BMI (kg/m2): 26.6 (07-29-19 @ 18:44)  BSA (m2): 1.71 (07-29-19 @ 18:44)      Physical Exam:  	Gen: chronically ill appearing F    	no jvd , supple neck,   	Pulm: decrease bs  no rales or ronchi or wheezing  	CV: RRR, S1S2; no rub  	Back: No CVA tenderness  	Abd: +BS, soft, nontender/nondistended  	: No suprapubic tenderness  	UE: Warm, no cyanosis  no clubbing,  no edema; no asterixis + RUE thrombophlebitis noted.   	LE: Warm, no cyanosis  no clubbing, 3+  edema  	Neuro: alert and oriented. speech coherent   		Skin: Warm, no decrease skin turgor   + avf + bruit/thrill       LABS/STUDIES  --------------------------------------------------------------------------------              11.2   5.3   >-----------<  235      [07-29-19 @ 16:55]              33.6     135  |  90  |  47  ----------------------------<  199      [07-29-19 @ 16:55]  5.7   |  25  |  6.22        Ca     9.5     [07-29-19 @ 16:55]    TPro  6.7  /  Alb  3.9  /  TBili  0.4  /  DBili  x   /  AST  32  /  ALT  22  /  AlkPhos  66  [07-29-19 @ 16:55]    PT/INR: PT 11.4 , INR 1.00       [07-29-19 @ 16:55]  PTT: 22.8       [07-29-19 @ 16:55]      Creatinine Trend:  SCr 6.22 [07-29 @ 16:55]  SCr 4.73 [07-15 @ 06:47]  SCr 3.16 [07-14 @ 07:19]  SCr 3.36 [07-13 @ 07:09]  SCr 4.13 [07-12 @ 06:57]    Urinalysis - [06-04-17 @ 08:24]      Color Yellow / Appearance Clear / SG 1.013 / pH 8.5      Gluc 1000 / Ketone Negative  / Bili Negative / Urobili Negative       Blood Negative / Protein >600 / Leuk Est Small / Nitrite Negative      RBC 3-5 / WBC 6-10 / Hyaline  / Gran  / Sq Epi  / Non Sq Epi OCC / Bacteria       Iron 42, TIBC 253, %sat 17      [06-17-19 @ 17:54]  Ferritin 1838      [06-17-19 @ 18:06]  PTH -- (Ca 9.6)      [06-17-19 @ 18:06]   177  PTH -- (Ca 7.8)      [03-29-19 @ 20:38]   73  PTH -- (Ca 7.3)      [02-22-19 @ 02:49]   59  HbA1c 8.2      [06-16-19 @ 13:24]  TSH 0.71      [11-17-18 @ 08:25]    HBsAb <3.0      [07-11-19 @ 18:26]  HBsAb Nonreact      [07-11-19 @ 18:26]  HBsAg Nonreact      [07-11-19 @ 18:26]  HBcAb Reactive      [07-11-19 @ 18:26]  HCV 0.09, Nonreact      [07-11-19 @ 18:26]    < from: CT Abdomen and Pelvis w/ IV Cont (07.29.19 @ 18:27) >  EXAM:  CT ABDOMEN AND PELVIS IC                            PROCEDURE DATE:  07/29/2019            INTERPRETATION:  CLINICAL INFORMATION: Diffuse abdominal tenderness.    COMPARISON: CT abdomen pelvis 6/15/2019    PROCEDURE:   CT of the Abdomen and Pelvis was performed with intravenous contrast.   Intravenous contrast: 90 ml Omnipaque 350. 10 ml discarded.  Oral contrast: None.  Sagittal and coronal reformats were performed.    FINDINGS:    LOWER CHEST: Diffuse bilateral septal thickening likelysecondary to   pulmonary edema. Left basilar linear atelectasis.    LIVER: Hepatic congestion may indicate right heart dysfunction.  BILE DUCTS: Dilated intra and extrahepatic biliary ducts  GALLBLADDER: Status post cholecystectomy.  SPLEEN: Within normal limits.  PANCREAS: Within normal limits.  ADRENALS: Within normal limits.  KIDNEYS/URETERS: Atrophic bilateral kidneys. Multiple left renal cysts.    BLADDER: Within normal limits.  REPRODUCTIVE ORGANS: Uterus and adnexa within normal limits.    BOWEL: No bowel obstruction.   PERITONEUM: Small volume ascites.  VESSELS: Extensive calcified atherosclerosis. Thrombosed left femoral   popliteal graft. Soft tissue infiltration and surgical clips adjacent to   the left femoral vein, likely postoperative.  RETROPERITONEUM: No lymphadenopathy.    ABDOMINAL WALL: Anasarca.  BONES: Spinal degenerative changes.    IMPRESSION:     No acute intra-abdominal pathology    Incidental findings as above    < end of copied text >

## 2019-07-29 NOTE — H&P ADULT - ASSESSMENT
64 f with    ESRD/ fluid overload  - HD  - Nephrology evaluation Dr. Schmidt    DM type 1  - ADA diet  - BS control    CHF cr systolic  - continue Rx  - cardiology evaluation Dr. Biswas    CAD  - continue Rx    COPD  - nebs    PVD  - conservative Rx  Anxiety/ Depression control  Further action as per clinical course  Pierce Viera MD pager [phone]

## 2019-07-29 NOTE — ED PROVIDER NOTE - PROGRESS NOTE DETAILS
received call from lab, K 7.0 on whole blood, still pending metabolic panel. ekg without findings suggestive of hyperK, will await cmp. - Jose Hinton MD dr murrieta evaluating patient. aware of k, does not recommend any intervention at this time. - Jose Hinton MD

## 2019-07-29 NOTE — H&P ADULT - NSHPLABSRESULTS_GEN_ALL_CORE
11.2   5.3   )-----------( 235      ( 29 Jul 2019 16:55 )             33.6       07-29    135  |  90<L>  |  47<H>  ----------------------------<  199<H>  5.7<H>   |  25  |  6.22<H>    Ca    9.5      29 Jul 2019 16:55    TPro  6.7  /  Alb  3.9  /  TBili  0.4  /  DBili  x   /  AST  32  /  ALT  22  /  AlkPhos  66  07-29                  PT/INR - ( 29 Jul 2019 16:55 )   PT: 11.4 sec;   INR: 1.00 ratio         PTT - ( 29 Jul 2019 16:55 )  PTT:22.8 sec  EKG SR Low voltage  < from: CT Abdomen and Pelvis w/ IV Cont (07.29.19 @ 18:27) >      IMPRESSION:     No acute intra-abdominal pathology    Incidental findings as above    < end of copied text >    < from: Xray Chest 2 Views PA/Lat (07.29.19 @ 17:47) >    INTERPRETATION:  no emergent finding  follow up official report    < end of copied text >

## 2019-07-29 NOTE — ED ADULT NURSE REASSESSMENT NOTE - NS ED NURSE REASSESS COMMENT FT1
Pt AAOx4, NAD, resp nonlabored, resting comfortably in bed, reports some improvement in abd pain at this time. Pt is requesting to eat, pt educated that she has to wait for CT results and will be informed when it is okay for her to eat as per MD, pt verbalizes understanding. Pt denies headache, dizziness, chest pain, palpitations, SOB, n/v/d, fevers, chills at this time. Pt awaiting HD. MD Schmidt at bedside signing HD consent with pt. Safety maintained.

## 2019-07-29 NOTE — ED ADULT NURSE NOTE - OBJECTIVE STATEMENT
65 y/o F, PMH HTN, DMT2, HD (Tues, Thurs, Sat), BIBA from home for lower leg edema and pain b/l. Pt reports that her Dialysis Center usually tries to fit her in an extra day each week but she hasn't been able to get her 4th day for the past 2 weeks and is feeling her legs become more swollen and tender. Pt has +2 pitting edema to lower legs b/l, tender to palpation, pos equal sensation b/l, pt able to wiggle toes b/l, color WNL, feet warm/dry b/l. Pt also c/o abdominal tenderness, feels new, unsure when it started, unable to describe abd pain but is ttp to entire abd. Pt denies headache, dizziness, chest pain, palpitations, cough, SOB, n/v/d, urinary symptoms, fevers, chills at this time. Safety maintained.

## 2019-07-29 NOTE — H&P ADULT - NSHPREVIEWOFSYSTEMS_GEN_ALL_CORE
REVIEW OF SYSTEMS:    CONSTITUTIONAL: + weakness, fevers or chills  EYES/ENT: No visual changes;  No vertigo or throat pain   NECK: No pain or stiffness  RESPIRATORY: No cough, wheezing, hemoptysis; + shortness of breath  CARDIOVASCULAR: No chest pain or palpitations  GASTROINTESTINAL: No abdominal or epigastric pain. No nausea, vomiting, or hematemesis; No diarrhea or constipation. No melena or hematochezia.  GENITOURINARY: No dysuria, frequency or hematuria  NEUROLOGICAL: No numbness or weakness  SKIN: No itching, burning, rashes, or lesions   All other review of systems is negative unless indicated above.

## 2019-07-29 NOTE — H&P ADULT - HISTORY OF PRESENT ILLNESS
64 Female hx CAD (Cath Feb '19 showing 99% RCA, 100% RPLS, 100% Cx) s/p multiple stents/brachytherapy, ESRD (on HD M/T/T/Sa), DM1 c/b neuropathy and retinopathy, CHF, mod MR, severe AS, RHF, TIA, severe PVD s/p b/l fempop bypass, left subclavian vein stenosis s/p stent, COPD not on O2 pw cc of LE swelling    	Last dialysis full session on saturday after which she felt more bloated then usual. Amount of swelling increased today to the point her legs were "going to explode". No new pain. No new SOB. No new CP.   	No N/V/D. No f/c.

## 2019-07-29 NOTE — ED PROVIDER NOTE - PHYSICAL EXAMINATION
General:  NAD  HEENT: pupils equal and reactive, normal oropharynx, normal external ears bilaterally   Cardiac: RRR, no MRG appreciated  Resp: lungs clear to auscultation bilaterally, symmetric chest wall rise  Abd: soft, nontender, nondistended, normoactive bowel sounds  : no CVA tenderness  Lower Extremities: 1+ pitting edema to shins, Pulses intact BL  Neuro: Moving all extremities  Skin:  normal color for race

## 2019-07-30 DIAGNOSIS — I10 ESSENTIAL (PRIMARY) HYPERTENSION: ICD-10-CM

## 2019-07-30 DIAGNOSIS — E10.65 TYPE 1 DIABETES MELLITUS WITH HYPERGLYCEMIA: ICD-10-CM

## 2019-07-30 DIAGNOSIS — E78.2 MIXED HYPERLIPIDEMIA: ICD-10-CM

## 2019-07-30 LAB
ANION GAP SERPL CALC-SCNC: 18 MMOL/L — HIGH (ref 5–17)
BUN SERPL-MCNC: 17 MG/DL — SIGNIFICANT CHANGE UP (ref 7–23)
CALCIUM SERPL-MCNC: 9.2 MG/DL — SIGNIFICANT CHANGE UP (ref 8.4–10.5)
CHLORIDE SERPL-SCNC: 92 MMOL/L — LOW (ref 96–108)
CK MB BLD-MCNC: 1.8 % — SIGNIFICANT CHANGE UP (ref 0–3.5)
CK MB CFR SERPL CALC: 7.4 NG/ML — HIGH (ref 0–3.8)
CK SERPL-CCNC: 401 U/L — HIGH (ref 25–170)
CO2 SERPL-SCNC: 27 MMOL/L — SIGNIFICANT CHANGE UP (ref 22–31)
CREAT SERPL-MCNC: 3.05 MG/DL — HIGH (ref 0.5–1.3)
GLUCOSE BLDC GLUCOMTR-MCNC: 150 MG/DL — HIGH (ref 70–99)
GLUCOSE BLDC GLUCOMTR-MCNC: 223 MG/DL — HIGH (ref 70–99)
GLUCOSE BLDC GLUCOMTR-MCNC: 282 MG/DL — HIGH (ref 70–99)
GLUCOSE BLDC GLUCOMTR-MCNC: 292 MG/DL — HIGH (ref 70–99)
GLUCOSE BLDC GLUCOMTR-MCNC: 408 MG/DL — HIGH (ref 70–99)
GLUCOSE BLDC GLUCOMTR-MCNC: 437 MG/DL — HIGH (ref 70–99)
GLUCOSE BLDC GLUCOMTR-MCNC: 459 MG/DL — CRITICAL HIGH (ref 70–99)
GLUCOSE SERPL-MCNC: 189 MG/DL — HIGH (ref 70–99)
HCT VFR BLD CALC: 37.1 % — SIGNIFICANT CHANGE UP (ref 34.5–45)
HGB BLD-MCNC: 11.2 G/DL — LOW (ref 11.5–15.5)
MAGNESIUM SERPL-MCNC: 2.2 MG/DL — SIGNIFICANT CHANGE UP (ref 1.6–2.6)
MCHC RBC-ENTMCNC: 30.2 GM/DL — LOW (ref 32–36)
MCHC RBC-ENTMCNC: 33.7 PG — SIGNIFICANT CHANGE UP (ref 27–34)
MCV RBC AUTO: 111.7 FL — HIGH (ref 80–100)
PHOSPHATE SERPL-MCNC: 4.7 MG/DL — HIGH (ref 2.5–4.5)
PLATELET # BLD AUTO: 231 K/UL — SIGNIFICANT CHANGE UP (ref 150–400)
POTASSIUM SERPL-MCNC: 4.1 MMOL/L — SIGNIFICANT CHANGE UP (ref 3.5–5.3)
POTASSIUM SERPL-SCNC: 4.1 MMOL/L — SIGNIFICANT CHANGE UP (ref 3.5–5.3)
RBC # BLD: 3.32 M/UL — LOW (ref 3.8–5.2)
RBC # FLD: 14.1 % — SIGNIFICANT CHANGE UP (ref 10.3–14.5)
SODIUM SERPL-SCNC: 137 MMOL/L — SIGNIFICANT CHANGE UP (ref 135–145)
TROPONIN T, HIGH SENSITIVITY RESULT: 231 NG/L — HIGH (ref 0–51)
TROPONIN T, HIGH SENSITIVITY RESULT: 233 NG/L — HIGH (ref 0–51)
TROPONIN T, HIGH SENSITIVITY RESULT: 235 NG/L — HIGH (ref 0–51)
WBC # BLD: 5.58 K/UL — SIGNIFICANT CHANGE UP (ref 3.8–10.5)
WBC # FLD AUTO: 5.58 K/UL — SIGNIFICANT CHANGE UP (ref 3.8–10.5)

## 2019-07-30 PROCEDURE — 99223 1ST HOSP IP/OBS HIGH 75: CPT

## 2019-07-30 RX ORDER — HUMAN INSULIN 100 [IU]/ML
2 INJECTION, SUSPENSION SUBCUTANEOUS
Refills: 0 | Status: DISCONTINUED | OUTPATIENT
Start: 2019-07-31 | End: 2019-07-31

## 2019-07-30 RX ORDER — INSULIN GLARGINE 100 [IU]/ML
5 INJECTION, SOLUTION SUBCUTANEOUS AT BEDTIME
Refills: 0 | Status: DISCONTINUED | OUTPATIENT
Start: 2019-07-31 | End: 2019-07-31

## 2019-07-30 RX ORDER — ENOXAPARIN SODIUM 100 MG/ML
7 INJECTION SUBCUTANEOUS
Qty: 0 | Refills: 0 | DISCHARGE

## 2019-07-30 RX ORDER — HUMAN INSULIN 100 [IU]/ML
2 INJECTION, SUSPENSION SUBCUTANEOUS ONCE
Refills: 0 | Status: DISCONTINUED | OUTPATIENT
Start: 2019-07-30 | End: 2019-07-30

## 2019-07-30 RX ORDER — INSULIN GLARGINE 100 [IU]/ML
2 INJECTION, SOLUTION SUBCUTANEOUS AT BEDTIME
Refills: 0 | Status: COMPLETED | OUTPATIENT
Start: 2019-07-30 | End: 2019-07-30

## 2019-07-30 RX ORDER — INSULIN LISPRO 100/ML
3 VIAL (ML) SUBCUTANEOUS
Refills: 0 | Status: DISCONTINUED | OUTPATIENT
Start: 2019-07-30 | End: 2019-08-03

## 2019-07-30 RX ORDER — INSULIN LISPRO 100/ML
VIAL (ML) SUBCUTANEOUS
Refills: 0 | Status: DISCONTINUED | OUTPATIENT
Start: 2019-07-30 | End: 2019-08-02

## 2019-07-30 RX ORDER — INSULIN GLARGINE 100 [IU]/ML
2 INJECTION, SOLUTION SUBCUTANEOUS ONCE
Refills: 0 | Status: DISCONTINUED | OUTPATIENT
Start: 2019-07-30 | End: 2019-07-30

## 2019-07-30 RX ORDER — INSULIN LISPRO 100/ML
VIAL (ML) SUBCUTANEOUS AT BEDTIME
Refills: 0 | Status: DISCONTINUED | OUTPATIENT
Start: 2019-07-30 | End: 2019-08-08

## 2019-07-30 RX ORDER — INSULIN GLARGINE 100 [IU]/ML
3 INJECTION, SOLUTION SUBCUTANEOUS ONCE
Refills: 0 | Status: COMPLETED | OUTPATIENT
Start: 2019-07-30 | End: 2019-07-30

## 2019-07-30 RX ADMIN — OXYCODONE AND ACETAMINOPHEN 2 TABLET(S): 5; 325 TABLET ORAL at 01:15

## 2019-07-30 RX ADMIN — Medication 25 MILLIGRAM(S): at 11:38

## 2019-07-30 RX ADMIN — OXYCODONE AND ACETAMINOPHEN 2 TABLET(S): 5; 325 TABLET ORAL at 21:02

## 2019-07-30 RX ADMIN — Medication 3 UNIT(S): at 17:49

## 2019-07-30 RX ADMIN — Medication 1 TABLET(S): at 11:38

## 2019-07-30 RX ADMIN — ATORVASTATIN CALCIUM 20 MILLIGRAM(S): 80 TABLET, FILM COATED ORAL at 22:27

## 2019-07-30 RX ADMIN — Medication 3: at 17:48

## 2019-07-30 RX ADMIN — Medication 50 MILLIGRAM(S): at 08:40

## 2019-07-30 RX ADMIN — CLOPIDOGREL BISULFATE 75 MILLIGRAM(S): 75 TABLET, FILM COATED ORAL at 11:38

## 2019-07-30 RX ADMIN — Medication 4 UNIT(S): at 09:07

## 2019-07-30 RX ADMIN — OXYCODONE AND ACETAMINOPHEN 2 TABLET(S): 5; 325 TABLET ORAL at 20:32

## 2019-07-30 RX ADMIN — OXYCODONE AND ACETAMINOPHEN 2 TABLET(S): 5; 325 TABLET ORAL at 00:43

## 2019-07-30 RX ADMIN — Medication 81 MILLIGRAM(S): at 11:38

## 2019-07-30 RX ADMIN — Medication 12: at 09:06

## 2019-07-30 RX ADMIN — Medication 2: at 13:31

## 2019-07-30 RX ADMIN — PANTOPRAZOLE SODIUM 40 MILLIGRAM(S): 20 TABLET, DELAYED RELEASE ORAL at 05:24

## 2019-07-30 RX ADMIN — INSULIN GLARGINE 2 UNIT(S): 100 INJECTION, SOLUTION SUBCUTANEOUS at 22:28

## 2019-07-30 RX ADMIN — Medication 3 MILLILITER(S): at 11:40

## 2019-07-30 RX ADMIN — Medication 1: at 22:27

## 2019-07-30 RX ADMIN — Medication 3 MILLILITER(S): at 06:15

## 2019-07-30 RX ADMIN — Medication 3 UNIT(S): at 13:31

## 2019-07-30 RX ADMIN — Medication 3 MILLILITER(S): at 17:49

## 2019-07-30 RX ADMIN — Medication 25 MILLIGRAM(S): at 20:32

## 2019-07-30 RX ADMIN — SEVELAMER CARBONATE 800 MILLIGRAM(S): 2400 POWDER, FOR SUSPENSION ORAL at 11:38

## 2019-07-30 RX ADMIN — INSULIN GLARGINE 3 UNIT(S): 100 INJECTION, SOLUTION SUBCUTANEOUS at 11:01

## 2019-07-30 RX ADMIN — SEVELAMER CARBONATE 800 MILLIGRAM(S): 2400 POWDER, FOR SUSPENSION ORAL at 18:56

## 2019-07-30 RX ADMIN — Medication 100 MILLIGRAM(S): at 22:27

## 2019-07-30 RX ADMIN — LISINOPRIL 40 MILLIGRAM(S): 2.5 TABLET ORAL at 17:48

## 2019-07-30 NOTE — PROGRESS NOTE ADULT - ASSESSMENT
64 f with    ESRD/ fluid overload  - HD  - Nephrology follow Dr. Schmidt    DM type 1  - ADA diet  - BS control  - endocrine evaluation    CHF cr systolic  - continue Rx  - cardiology follow    CAD/ s/p NSTEMI  - continue Rx  - cardiology evaluation    COPD  - nebs    PVD  - conservative Rx  Anxiety/ Depression control  Pierce Viera MD pager [phone]

## 2019-07-30 NOTE — CHART NOTE - NSCHARTNOTEFT_GEN_A_CORE
[id number]  NATHAN MEEKS    Notified by RN patient with critical lab result of     Patient seen and examined.  Has no complaints or complaints of     Plan of Care/ Interventions: [id number]  NATHAN MEEKS    Notified by RN patient with chest pain, 10/10 in severity.     Pt seen and examined. Patient is a 64y old  Female who presents with a chief complaint of sob (29 Jul 2019 23:18)  DM, Htn, CAD s/p Stents, ESRD on HD, CHF, COPD, TIA and HLD. Now reports 10/10 Chest pain with associated symptoms of   Vitals sign-          .  PE:                Stat ECG done revealed acute MI. Cath team notified, spoke to cards fellow Dr Hagen who came to see patient. Advised interventional cardiologist Dr Vela will also come to evaluated. Discussed with attending Physician, Dr Parker who also states cardiologist, Dr Andino will come to see patient. No further inventions at time. Will continue current cardiac meds including-                           . Will monitor patient closely. RN advised to call if any deterioration or concern in patient condition    07-30    137  |  92<L>  |  17  ----------------------------<  189<H>  4.1   |  27  |  3.05<H>    Ca    9.2      30 Jul 2019 01:25    TPro  6.7  /  Alb  3.9  /  TBili  0.4  /  DBili  x   /  AST  32  /  ALT  22  /  AlkPhos  66  07-29                        11.2   5.58  )-----------( 231      ( 30 Jul 2019 08:26 )             37.1 [id number]  NATHAN MEEKS    Notified by RN patient with chest pain, 10/10 in severity.     Pt seen and examined. Patient is a 64y old  Female who presents with a chief complaint of sob (29 Jul 2019 23:18). Has Pmhx of DM, Htn, CAD s/p Stents, ESRD on HD, CHF, COPD, TIA and HLD. Now reports 10/10 Chest pain with associated symptoms of shortness of breath and difficulty breathing. Patient states having this chest pain with difficulty breathing before and states the symptoms only go away with O2 administration. Patient states taking 4x 81mg ASA that alleviated some of the pain. Patient denies any radiation of pain, nausea or vomiting.   Vitals sign- T: 98.1 (36.7); HR: 92 (pulse ox); BP: 103/57 RUE; RR: 18; O2: 100% on supplemental o2.      PE: General: A&O x4, nontoxic, appropriate mood, NAD; HEENT: PEEAR[place], NC/AT Lungs: Clear to auscultation B/L; Cardiac: RRR, +S1/S2, no M/R/G Abd: soft, NT/ND, +BS; Extremities: no C/C/E, B/L LE tender to palpation due to dialysis stated by patient.            Stat ECG done revealed acute MI. Cath team notified, spoke to cards fellow Dr Hagen who came to see patient. Advised interventional cardiologist Dr Vela will also come to evaluated. Discussed with attending Physician, Dr Viera who also states cardiologist, Dr Andino will come to see patient. No further inventions at time. Will continue current cardiac meds including- ASA, plavix, metoprolol succinate ER, atorvastatin. Will monitor patient closely. RN advised to call if any deterioration or concern in patient condition    07-30    137  |  92<L>  |  17  ----------------------------<  189<H>  4.1   |  27  |  3.05<H>    Ca    9.2      30 Jul 2019 01:25    TPro  6.7  /  Alb  3.9  /  TBili  0.4  /  DBili  x   /  AST  32  /  ALT  22  /  AlkPhos  66  07-29                        11.2   5.58  )-----------( 231      ( 30 Jul 2019 08:26 )             37.1 [id number]  IFTIKHAR MEEKSARET    Notified by RN patient with chest pain, 10/10 in severity.     Pt seen and examined. Patient is a 64y old  Female who presents with a chief complaint of sob (29 Jul 2019 23:18). Has Pmhx of DM, Htn, CAD s/p Stents, ESRD on HD, CHF, COPD, TIA and HLD. Now reports 10/10 Chest pain with associated symptoms of shortness of breath and difficulty breathing. Patient states having this chest pain with difficulty breathing before and states the symptoms only go away with O2 administration. Patient taking 4x 81mg ASA that alleviated some of the pain. Patient denies any radiation of pain, diaphoresis, stabbing back pain, abdominal pain, nausea or vomiting. Vitals sign- T: 98.1 (36.7); HR: 92 (pulse ox); BP: 103/57 RUE; RR: 18; O2: 100% on supplemental o2. PE: General: A&O x4, nontoxic, appropriate mood, NAD; HEENT: SHARON, NC/AT, Lungs: Clear to auscultation B/L; Cardiac: RRR, +S1/S2, no M/R/G Abd: soft, NT/ND, +BS; Extremities: no C/C/E, B/L LE tender to palpation due to dialysis stated by patient. Stat ECG done revealed acute MI. Cath team notified, spoke to cards fellow Dr Hagen who came to see patient. Advised interventional cardiologist Dr Vela will also come to evaluated. Discussed with attending Physician, Dr Viera who also states cardiologist, Dr Andino will come to see patient. No further inventions at time. Will continue current cardiac meds including- ASA, plavix, metoprolol succinate ER, atorvastatin. Will monitor patient closely. RN advised to call if any deterioration or concern in patient condition    07-30    137  |  92<L>  |  17  ----------------------------<  189<H>  4.1   |  27  |  3.05<H>    Ca    9.2      30 Jul 2019 01:25    TPro  6.7  /  Alb  3.9  /  TBili  0.4  /  DBili  x   /  AST  32  /  ALT  22  /  AlkPhos  66  07-29                        11.2   5.58  )-----------( 231      ( 30 Jul 2019 08:26 )             37.1 [id number]  IFTIKHAR MEEKSARET    Notified by RN patient with chest pain, 10/10 in severity.     Pt seen and examined. Patient is a 64y old  Female who presents with a chief complaint of sob (29 Jul 2019 23:18). Has Pmhx of DM, Htn, CAD s/p Stents, ESRD on HD, CHF, COPD, TIA and HLD. Now reports 10/10 Chest pain with associated symptoms of shortness of breath and difficulty breathing. Patient states having this chest pain with difficulty breathing before and states the symptoms only go away with O2 administration. Patient taking 4x 81mg ASA that alleviated some of the pain. Patient denies any radiation of pain, diaphoresis, stabbing back pain, abdominal pain, nausea or vomiting. Vitals sign- T: 98.1 (36.7); HR: 92 (pulse ox); BP: 103/57 RUE; RR: 18; O2: 100% on supplemental o2. PE: General: A&O x4, nontoxic, appropriate mood, NAD; HEENT: SHARON, NC/AT, Lungs: Clear to auscultation B/L; Cardiac: RRR, +S1/S2, no M/R/G Abd: soft, NT/ND, +BS; Extremities: no C/C/E, B/L LE tender to palpation due to dialysis stated by patient. Stat ECG done revealed acute MI. Cath team notified, spoke to cards fellow Dr Hagen who came to see patient. Advised interventional cardiologist Dr Vela will also come to evaluate. Discussed with attending Physician, Dr Viera who also states cardiologist, Dr Christianson will come to see patient. No further interventions at time. Will continue current cardiac meds including- ASA, plavix, metoprolol succinate ER, atorvastatin. Will monitor patient closely. RN advised to call if any deterioration or concern in patient condition    07-30                        11.2   5.58  )-----------( 231      ( 30 Jul 2019 08:26 )             37.1       137  |  92<L>  |  17  ----------------------------<  189<H>  4.1   |  27  |  3.05<H>    Ca    9.2      30 Jul 2019 01:25    TPro  6.7  /  Alb  3.9  /  TBili  0.4  /  DBili  x   /  AST  32  /  ALT  22  /  AlkPhos  66  07-29                        11.2   5.58  )-----------( 231      ( 30 Jul 2019 08:26 )             37.1    HST: 235<---231<---233    Addendum: PT also noted to have 10 beat of WCT on telemetry, pt asymptomatic, will continue on Metroprolol. Cards aware. Will optimize medical management of patient for now and closely monitor.     Layne Marino PA-C   Dept of Medicine   [id number] [employer]

## 2019-07-30 NOTE — PROGRESS NOTE ADULT - ASSESSMENT
this is an unfortunate patient with end-stage mis, severe peripheral vascular diseas, severe carotid disease and severe coronary disease, status post  MULTIPLE INTERVENTIONS  of the right coronary artery including brACHYTHERAPY>  Presently the patient has mildchest discomft and is hemodynamically stabl    I would recommend conservative therapy with nitrates, beta blockers and dialysis as needed. Would not consider repeat cardiac catheterization

## 2019-07-30 NOTE — PROGRESS NOTE ADULT - SUBJECTIVE AND OBJECTIVE BOX
I was called by the cardiology fellow to evaluate the patient for possible urgent cardiac catheterization  Patient is well known to me and is followed by Dr. Biswas and Dr. Schmidt.  HPI:  64 Female hx CAD (Cath Feb '19 showing 99% RCA, 100% RPLS, 100% Cx) s/p multiple stents/brachytherapy, ESRD (on HD M/T/T/Sa), DM1 c/b neuropathy and retinopathy, CHF, mod MR, severe AS, RHF, TIA, severe PVD s/p b/l fempop bypass, left subclavian vein stenosis s/p stent, COPD not on O2 pw cc of LE swelling    	Last dialysis full session on saturday after which she felt more bloated then usual. Amount of swelling increased today to the point her legs were "going to explode". No new pain. No new SOB. No new CP.   	No N/V/D. No f/c. (29 Jul 2019 22:15)   patient developed severe chest pain this morning with new EKG changes inferiorly and persistent pain  She did have a repeat catheteriz in April 2019 which revealed severe restenosis and total occlusion distally of the right coronary artery with left-to-right collaterals from a non-jeopardize to LAD  Presently she has mild  chest discomfort  and her ECG has improved with less ST elevation inferiorly     MEDICATIONS:  MEDICATIONS  (STANDING):  ALBUTerol/ipratropium for Nebulization 3 milliLiter(s) Nebulizer every 6 hours  aspirin enteric coated 81 milliGRAM(s) Oral daily  atorvastatin 20 milliGRAM(s) Oral at bedtime  clopidogrel Tablet 75 milliGRAM(s) Oral daily  dextrose 5%. 1000 milliLiter(s) (50 mL/Hr) IV Continuous <Continuous>  dextrose 50% Injectable 12.5 Gram(s) IV Push once  dextrose 50% Injectable 25 Gram(s) IV Push once  dextrose 50% Injectable 25 Gram(s) IV Push once  docusate sodium 100 milliGRAM(s) Oral three times a day  heparin  Injectable 5000 Unit(s) SubCutaneous every 12 hours  hydrALAZINE 25 milliGRAM(s) Oral three times a day  insulin glargine Injectable (LANTUS) 5 Unit(s) SubCutaneous at bedtime  insulin lispro (HumaLOG) corrective regimen sliding scale   SubCutaneous three times a day before meals  insulin lispro (HumaLOG) corrective regimen sliding scale   SubCutaneous at bedtime  insulin lispro Injectable (HumaLOG) 4 Unit(s) SubCutaneous before breakfast  insulin lispro Injectable (HumaLOG) 4 Unit(s) SubCutaneous before lunch  insulin lispro Injectable (HumaLOG) 4 Unit(s) SubCutaneous before dinner  lisinopril 40 milliGRAM(s) Oral daily  metoprolol succinate ER 50 milliGRAM(s) Oral daily  multivitamin 1 Tablet(s) Oral daily  pantoprazole    Tablet 40 milliGRAM(s) Oral before breakfast  sevelamer carbonate 800 milliGRAM(s) Oral three times a day with meals      PHYSICAL EXAM:  T(C): 36.7 (07-30-19 @ 08:00), Max: 37 (07-29-19 @ 23:44)  HR: 84 (07-30-19 @ 11:36) (73 - 103)  BP: 127/67 (07-30-19 @ 11:36) (102/68 - 158/73)  RR: 18 (07-30-19 @ 08:00) (16 - 20)  SpO2: 100% (07-30-19 @ 08:00) (95% - 100%)  Wt(kg): --  I&O's Summary    29 Jul 2019 07:01  -  30 Jul 2019 07:00  --------------------------------------------------------  IN: 900 mL / OUT: 3400 mL / NET: -2500 mL      Height (cm): 160 (07-29 @ 18:44)  Weight (kg): 68 (07-29 @ 16:00)  BMI (kg/m2): 26.6 (07-29 @ 18:44)  BSA (m2): 1.71 (07-29 @ 18:44)    Appearance: Normal	radically ill-appearig complaining of mild chest discomfort  HEENT:   Normal oral mucosa, PERRL, EOMI	  Cardiovascular: Normal S1 S2, No JVD, No murmurs ,  Respiratory: Lungs clear to auscultation, normal effort 	      LABS:    CARDIAC MARKERS:  CARDIAC MARKERS ( 30 Jul 2019 01:25 )  x     / x     / 401 U/L / x     / 7.4 ng/mL                                11.2   5.58  )-----------( 231      ( 30 Jul 2019 08:26 )             37.1     07-30    137  |  92<L>  |  17  ----------------------------<  189<H>  4.1   |  27  |  3.05<H>    Ca    9.2      30 Jul 2019 01:25    TPro  6.7  /  Alb  3.9  /  TBili  0.4  /  DBili  x   /  AST  32  /  ALT  22  /  AlkPhos  66  07-29    proBNP: Serum Pro-Brain Natriuretic Peptide: >68922 pg/mL (07-29 @ 16:55)    Lipid Profile:   HgA1c:   TSH:           TELEMETRY: 	    ECG:  	  RADIOLOGY:   DIAGNOSTIC TESTING:  [ ] Echocardiogram:  [ ]  Catheterization:  [ ] Stress Test:    OTHER:

## 2019-07-30 NOTE — PROGRESS NOTE ADULT - SUBJECTIVE AND OBJECTIVE BOX
[facility]   [phone]  RENAL FOLLOW UP NOTE  --------------------------------------------------------------------------------  Chief Complaint:    24 hour events/subjective:  seen earlier this evening.  at bedside   tele had arrhythmia and per NP had c/o cp earlier as well       PAST HISTORY  --------------------------------------------------------------------------------  No significant changes to PMH, PSH, FHx, SHx, unless otherwise noted    ALLERGIES & MEDICATIONS  --------------------------------------------------------------------------------  Allergies    No Known Allergies    Intolerances      Standing Inpatient Medications  ALBUTerol/ipratropium for Nebulization 3 milliLiter(s) Nebulizer every 6 hours  aspirin enteric coated 81 milliGRAM(s) Oral daily  atorvastatin 20 milliGRAM(s) Oral at bedtime  clopidogrel Tablet 75 milliGRAM(s) Oral daily  dextrose 5%. 1000 milliLiter(s) IV Continuous <Continuous>  dextrose 50% Injectable 12.5 Gram(s) IV Push once  dextrose 50% Injectable 25 Gram(s) IV Push once  dextrose 50% Injectable 25 Gram(s) IV Push once  docusate sodium 100 milliGRAM(s) Oral three times a day  heparin  Injectable 5000 Unit(s) SubCutaneous every 12 hours  hydrALAZINE 25 milliGRAM(s) Oral three times a day  insulin glargine Injectable (LANTUS) 2 Unit(s) SubCutaneous at bedtime  insulin lispro (HumaLOG) corrective regimen sliding scale   SubCutaneous three times a day before meals  insulin lispro (HumaLOG) corrective regimen sliding scale   SubCutaneous at bedtime  insulin lispro Injectable (HumaLOG) 3 Unit(s) SubCutaneous three times a day before meals  lisinopril 40 milliGRAM(s) Oral daily  metoprolol succinate ER 50 milliGRAM(s) Oral daily  multivitamin 1 Tablet(s) Oral daily  pantoprazole    Tablet 40 milliGRAM(s) Oral before breakfast  sevelamer carbonate 800 milliGRAM(s) Oral three times a day with meals    PRN Inpatient Medications  acetaminophen   Tablet .. 650 milliGRAM(s) Oral every 6 hours PRN  dextrose 40% Gel 15 Gram(s) Oral once PRN  glucagon  Injectable 1 milliGRAM(s) IntraMuscular once PRN  oxyCODONE    5 mG/acetaminophen 325 mG 2 Tablet(s) Oral every 6 hours PRN  senna 2 Tablet(s) Oral at bedtime PRN      REVIEW OF SYSTEMS  --------------------------------------------------------------------------------    Gen: denies , fevers/chills,  CVS: denies chest pain/palpitations  Resp: denies SOB/Cough  GI: Denies N/V/Abd pain  : Denies dysuria/oliguria/hematuria  + c/o edema     All other systems were reviewed and are negative, except as noted.    VITALS/PHYSICAL EXAM  --------------------------------------------------------------------------------  T(C): 36.2 (07-30-19 @ 20:15), Max: 37 (07-29-19 @ 23:44)  HR: 79 (07-30-19 @ 20:15) (73 - 92)  BP: 142/74 (07-30-19 @ 20:15) (102/68 - 158/73)  RR: 16 (07-30-19 @ 20:15) (16 - 18)  SpO2: 100% (07-30-19 @ 20:15) (95% - 100%)  Wt(kg): --  Height (cm): 160 (07-29-19 @ 18:44)  Weight (kg): 68 (07-29-19 @ 16:00)  BMI (kg/m2): 26.6 (07-29-19 @ 18:44)  BSA (m2): 1.71 (07-29-19 @ 18:44)      07-29-19 @ 07:01  -  07-30-19 @ 07:00  --------------------------------------------------------  IN: 900 mL / OUT: 3400 mL / NET: -2500 mL      Physical Exam:  	Gen: chronically ill appearing F    	no jvd , supple neck,   	Pulm: decrease bs  no rales or ronchi or wheezing  	CV: RRR, S1S2; no rub  	Back: No CVA tenderness  	Abd: +BS, soft, nontender/nondistended  	: No suprapubic tenderness  	UE: Warm, no cyanosis  no clubbing,  no edema; no asterixis + RUE thrombophlebitis   	LE: Warm, no cyanosis  no clubbing, 3+  edema  	Neuro: alert and oriented. speech coherent   		Skin: Warm, no decrease skin turgor   + avf + bruit/thrill 	        LABS/STUDIES  --------------------------------------------------------------------------------              11.2   5.58  >-----------<  231      [07-30-19 @ 08:26]              37.1     137  |  92  |  17  ----------------------------<  189      [07-30-19 @ 01:25]  4.1   |  27  |  3.05        Ca     9.2     [07-30-19 @ 01:25]      Mg     2.2     [07-30-19 @ 07:56]      Phos  4.7     [07-30-19 @ 07:56]    TPro  6.7  /  Alb  3.9  /  TBili  0.4  /  DBili  x   /  AST  32  /  ALT  22  /  AlkPhos  66  [07-29-19 @ 16:55]    PT/INR: PT 11.4 , INR 1.00       [07-29-19 @ 16:55]  PTT: 22.8       [07-29-19 @ 16:55]          [07-30-19 @ 01:25]    Creatinine Trend:  SCr 3.05 [07-30 @ 01:25]  SCr 6.22 [07-29 @ 16:55]  SCr 4.73 [07-15 @ 06:47]  SCr 3.16 [07-14 @ 07:19]  SCr 3.36 [07-13 @ 07:09]                  Iron 42, TIBC 253, %sat 17      [06-17-19 @ 17:54]  Ferritin 1838      [06-17-19 @ 18:06]  PTH -- (Ca 9.6)      [06-17-19 @ 18:06]   177  PTH -- (Ca 7.8)      [03-29-19 @ 20:38]   73  PTH -- (Ca 7.3)      [02-22-19 @ 02:49]   59  HbA1c 8.2      [06-16-19 @ 13:24]  TSH 0.71      [11-17-18 @ 08:25]

## 2019-07-30 NOTE — CONSULT NOTE ADULT - SUBJECTIVE AND OBJECTIVE BOX
HPI: 63 year old woman with PMH uncontrolled DM1 uncontrolled (HbA1c 8.2% 6/19) since age 21 with neuropathy and retinopathy as well as CAD, CHF (EF 50% 10/2018), TIA, PVD s/p b/l fem-pop bypass, ESRD HD M/T/Th/Sat, presenting with 1 day of SOB at rest. The patient notes that her HD center has cut her down from 4x/week to 3x/week for the past 2 weeks. She got HD last night which has helped her feel better. Also oxygen has helped. At home she does not use oxygen (as she does not qualify), or inhalers/nebulizer.  Unfortunately she did not get her Lantus last night so her bs have been high today. The diabetes team was not called to see her, the patient was seen sitting on a stretcher in the ED. At home she takes Lantus 3-4 units sq qhs and humalog 4 units qid. She notes that she takes her humalog "like water". She cannot recall her bs on her day of presentation but endorses that they have been good as of late.  Endo: Dr. Ley      PAST MEDICAL & SURGICAL HISTORY:  COPD (chronic obstructive pulmonary disease)  Localized enlarged lymph nodes  CHF (congestive heart failure): EF 40-45%  Subclavian vein stenosis, left: s/p stent  DKA, type 1: 1/2015  ACS (acute coronary syndrome): 1/2015 - cath revealed 100% ostial stenosis not amenable to PCI - medical management  TIA (transient ischemic attack): x 2 - 8-9 years ago prior to ASD/VSD repair  CAD (coronary artery disease): s/p stents  Gout: past  CVA (cerebral infarction): with no residual, 8 yrs ago, prior to heart surgery - ST memory loss  Peripheral vascular disease: occluded left fem-pop bypass 5/2015  Diabetes mellitus type 1: Insulin Dependent -  ESRD (end stage renal disease): dialysis  M, tue, thursday, saturday  Hyperlipidemia  Status post device closure of ASD: clmshell  History of cardiac catheterization: 1/2015 - no intervention  S/P femoral-popliteal bypass surgery: L and R in 2013 with graft; 5/2015 CFA angioplasty left and ileofemoral endarterectomy with vein patch angioplasty of left fem-pop bypass graft  Multiple vascular surgery both leg, left fempop bypass revision 11/2015  AV (arteriovenous fistula): Left AV graft; revision with stent placement 2-3 years ago  S/P cholecystectomy      FAMILY HISTORY:  T1DM: nephew    Social History:  Tobacco: quit 18 years ago, 1ppd x 30 years  Lives with , adult children and grandson    Outpatient Medications: Lantus and humalog    MEDICATIONS  (STANDING):  ALBUTerol/ipratropium for Nebulization 3 milliLiter(s) Nebulizer every 6 hours  aspirin enteric coated 81 milliGRAM(s) Oral daily  atorvastatin 20 milliGRAM(s) Oral at bedtime  clopidogrel Tablet 75 milliGRAM(s) Oral daily  dextrose 5%. 1000 milliLiter(s) (50 mL/Hr) IV Continuous <Continuous>  dextrose 50% Injectable 12.5 Gram(s) IV Push once  dextrose 50% Injectable 25 Gram(s) IV Push once  dextrose 50% Injectable 25 Gram(s) IV Push once  docusate sodium 100 milliGRAM(s) Oral three times a day  heparin  Injectable 5000 Unit(s) SubCutaneous every 12 hours  hydrALAZINE 25 milliGRAM(s) Oral three times a day  insulin glargine Injectable (LANTUS) 2 Unit(s) SubCutaneous once  insulin glargine Injectable (LANTUS) 5 Unit(s) SubCutaneous at bedtime  insulin lispro (HumaLOG) corrective regimen sliding scale   SubCutaneous three times a day before meals  insulin lispro (HumaLOG) corrective regimen sliding scale   SubCutaneous at bedtime  insulin lispro Injectable (HumaLOG) 4 Unit(s) SubCutaneous before breakfast  insulin lispro Injectable (HumaLOG) 4 Unit(s) SubCutaneous before lunch  insulin lispro Injectable (HumaLOG) 4 Unit(s) SubCutaneous before dinner  lisinopril 40 milliGRAM(s) Oral daily  metoprolol succinate ER 50 milliGRAM(s) Oral daily  multivitamin 1 Tablet(s) Oral daily  pantoprazole    Tablet 40 milliGRAM(s) Oral before breakfast  sevelamer carbonate 800 milliGRAM(s) Oral three times a day with meals    MEDICATIONS  (PRN):  acetaminophen   Tablet .. 650 milliGRAM(s) Oral every 6 hours PRN Temp greater or equal to 38.5C (101.3F), Mild Pain (1 - 3)  dextrose 40% Gel 15 Gram(s) Oral once PRN Blood Glucose LESS THAN 70 milliGRAM(s)/deciliter  glucagon  Injectable 1 milliGRAM(s) IntraMuscular once PRN Glucose LESS THAN 70 milligrams/deciliter  oxyCODONE    5 mG/acetaminophen 325 mG 2 Tablet(s) Oral every 6 hours PRN Moderate Pain (4 - 6)  senna 2 Tablet(s) Oral at bedtime PRN Constipation      Allergies  No Known Allergies        Review of Systems:  Constitutional: No fever/chills, +good appetite/po intake  Eyes: +blurry vision, diplopia  Neuro: No headache, problems with blance  HEENT: No dysphagia, dysphonia  Cardiovascular: +chest pain, palpitations  Respiratory: +SOB, no cough  GI: No nausea, vomiting,   : No dysuria, +little urine production  ALL OTHER SYSTEMS REVIEWED AND NEGATIVE      PHYSICAL EXAM:  VITALS: T(C): 36.7 (07-30-19 @ 08:00)  T(F): 98.1 (07-30-19 @ 08:00), Max: 98.6 (07-29-19 @ 23:44)  HR: 84 (07-30-19 @ 11:36) (73 - 103)  BP: 127/67 (07-30-19 @ 11:36) (102/68 - 158/73)  RR:  (16 - 20)  SpO2:  (95% - 100%)  Wt(kg): --  GENERAL: NAD, well-groomed, well-developed  EYES: No proptosis, anicteric  HEENT:  Atraumatic, Normocephalic, moist mucous membranes  THYROID: Normal size, no palpable nodules  RESPIRATORY: Clear to auscultation bilaterally; No rales, rhonchi, wheezing, or rubs  CARDIOVASCULAR: Regular rate and rhythm; No murmurs; +1 edema to L mid-leg and 2+ to right mid-leg  GI: Soft, nontender, non distended, normal bowel sounds  SKIN: Dry, intact, No rashes or lesions on feet b/l  PSYCH: reactive affect, euthymic mood      POCT Blood Glucose.: 408 mg/dL (07-30-19 @ 10:09)  POCT Blood Glucose.: 459 mg/dL (07-30-19 @ 09:02)  POCT Blood Glucose.: 437 mg/dL (07-30-19 @ 08:57)  POCT Blood Glucose.: 150 mg/dL (07-29-19 @ 23:55)                            11.2   5.58  )-----------( 231      ( 30 Jul 2019 08:26 )             37.1       07-30    137  |  92<L>  |  17  ----------------------------<  189<H>  4.1   |  27  |  3.05<H>    EGFR if : 18<L>  EGFR if non : 16<L>    Ca    9.2      07-30    TPro  6.7  /  Alb  3.9  /  TBili  0.4  /  DBili  x   /  AST  32  /  ALT  22  /  AlkPhos  66  07-29        Hemoglobin A1C, Whole Blood: 8.2 % <H> [4.0 - 5.6] (06-16-19 @ 13:24)

## 2019-07-30 NOTE — CONSULT NOTE ADULT - PROBLEM SELECTOR RECOMMENDATION 9
-Patient with hyperglycemia today due to missed Lantus last night. Primary team ordered Lantus 3 units this am and 5 units sq qhs. This is too much, so would recommend:  Lantus 2 units sq tonight x 1 and NPH 2 units sq x 1 on the am of 7/31. Standing QHS lantus can resume on 7/31.  -Patient got 12 units of humalog this am as she was ordered for the moderate scale. She is very insulin sensitive so this can lead to hypoglycemia. She should be on the low dose scale.   -Diabetic/renal diet with QHS snack  DISPO: basal/bolus insulin   Dr. Ley, endocrine at [facility]

## 2019-07-30 NOTE — PROGRESS NOTE ADULT - SUBJECTIVE AND OBJECTIVE BOX
Patient is a 64y old  Female who presents with a chief complaint of sob (30 Jul 2019 12:53)      SUBJECTIVE / OVERNIGHT EVENTS: had chest pain. feels better now.   Review of Systems  chest pain no  palpitations no  sob improving   nausea no  headache no    MEDICATIONS  (STANDING):  ALBUTerol/ipratropium for Nebulization 3 milliLiter(s) Nebulizer every 6 hours  aspirin enteric coated 81 milliGRAM(s) Oral daily  atorvastatin 20 milliGRAM(s) Oral at bedtime  clopidogrel Tablet 75 milliGRAM(s) Oral daily  dextrose 5%. 1000 milliLiter(s) (50 mL/Hr) IV Continuous <Continuous>  dextrose 50% Injectable 12.5 Gram(s) IV Push once  dextrose 50% Injectable 25 Gram(s) IV Push once  dextrose 50% Injectable 25 Gram(s) IV Push once  docusate sodium 100 milliGRAM(s) Oral three times a day  heparin  Injectable 5000 Unit(s) SubCutaneous every 12 hours  hydrALAZINE 25 milliGRAM(s) Oral three times a day  insulin glargine Injectable (LANTUS) 2 Unit(s) SubCutaneous at bedtime  insulin lispro (HumaLOG) corrective regimen sliding scale   SubCutaneous three times a day before meals  insulin lispro (HumaLOG) corrective regimen sliding scale   SubCutaneous at bedtime  insulin lispro Injectable (HumaLOG) 3 Unit(s) SubCutaneous three times a day before meals  lisinopril 40 milliGRAM(s) Oral daily  metoprolol succinate ER 50 milliGRAM(s) Oral daily  multivitamin 1 Tablet(s) Oral daily  pantoprazole    Tablet 40 milliGRAM(s) Oral before breakfast  sevelamer carbonate 800 milliGRAM(s) Oral three times a day with meals    MEDICATIONS  (PRN):  acetaminophen   Tablet .. 650 milliGRAM(s) Oral every 6 hours PRN Temp greater or equal to 38.5C (101.3F), Mild Pain (1 - 3)  dextrose 40% Gel 15 Gram(s) Oral once PRN Blood Glucose LESS THAN 70 milliGRAM(s)/deciliter  glucagon  Injectable 1 milliGRAM(s) IntraMuscular once PRN Glucose LESS THAN 70 milligrams/deciliter  oxyCODONE    5 mG/acetaminophen 325 mG 2 Tablet(s) Oral every 6 hours PRN Moderate Pain (4 - 6)  senna 2 Tablet(s) Oral at bedtime PRN Constipation      Vital Signs Last 24 Hrs  T(C): 36.7 (30 Jul 2019 15:32), Max: 37 (29 Jul 2019 23:44)  T(F): 98 (30 Jul 2019 15:32), Max: 98.6 (29 Jul 2019 23:44)  HR: 83 (30 Jul 2019 17:44) (73 - 103)  BP: 136/73 (30 Jul 2019 17:44) (102/68 - 158/73)  BP(mean): --  RR: 18 (30 Jul 2019 15:32) (18 - 20)  SpO2: 100% (30 Jul 2019 15:32) (95% - 100%)    PHYSICAL EXAM:  GENERAL: NAD   HEAD:  Atraumatic, Normocephalic  EYES: EOMI, PERRLA, conjunctiva and sclera clear  NECK: Supple, No JVD  CHEST/LUNG: Clear to auscultation bilaterally; No wheeze  HEART: Regular rate and rhythm; No murmurs, rubs, or gallops  ABDOMEN: Soft, Nontender, Nondistended; Bowel sounds present  EXTREMITIES:  2+ bl edema L>R  PSYCH: Anxious  NEUROLOGY: non-focal  SKIN: No rashes or lesions    LABS:                        11.2   5.58  )-----------( 231      ( 30 Jul 2019 08:26 )             37.1     07-30    137  |  92<L>  |  17  ----------------------------<  189<H>  4.1   |  27  |  3.05<H>    Ca    9.2      30 Jul 2019 01:25  Phos  4.7     07-30  Mg     2.2     07-30    TPro  6.7  /  Alb  3.9  /  TBili  0.4  /  DBili  x   /  AST  32  /  ALT  22  /  AlkPhos  66  07-29    PT/INR - ( 29 Jul 2019 16:55 )   PT: 11.4 sec;   INR: 1.00 ratio         PTT - ( 29 Jul 2019 16:55 )  PTT:22.8 sec  CARDIAC MARKERS ( 30 Jul 2019 01:25 )  x     / x     / 401 U/L / x     / 7.4 ng/mL            RADIOLOGY & ADDITIONAL TESTS:    Imaging Personally Reviewed:    Consultant(s) Notes Reviewed:      Care Discussed with Consultants/Other Providers:

## 2019-07-30 NOTE — CONSULT NOTE ADULT - CONSULT REQUESTED BY NAME
Telephone Encounter by Chasidy Torres MD at 04/19/17 08:01 AM     Author:  Chasidy Torres MD Service:  (none) Author Type:  Physician     Filed:  04/19/17 08:01 AM Encounter Date:  4/18/2017 Status:  Signed     :  Chasidy Torres MD (Physician)       From: Queta Pantoja  To: Chasidy Torres MD  Sent: 4/18/2017  7:56 PM CDT  Subject: Medication Questions    Will the medicine help with the itching on my scalp? It's only in select   areas where it's really thin or missing hair completely, the crown and   sides.       Revision History        Date/Time User Provider Type Action    > 04/19/17 08:01 AM Chasidy Torres MD Physician Sign    Attribution information within the note text is not available.             Dr. Pierce Viera

## 2019-07-30 NOTE — PROVIDER CONTACT NOTE (OTHER) - ASSESSMENT
Pt having chest pain, 10/10 in distress. /67, HR 92, 97% on room air with pt requesting oxygen. 2L put on and SpO2 100%. 2nd troponin drawn this morning, awaiting result. EKG showing possible NSTEMI. Pt due for Plavix and aspirin at 1200 today.

## 2019-07-30 NOTE — CONSULT NOTE ADULT - PROBLEM SELECTOR RECOMMENDATION 3
-atorvastatin 20mg po qhs    Paris Colbert MD  Pager: [phone]  Nights and Weekends: [phone]

## 2019-07-30 NOTE — PROGRESS NOTE ADULT - ASSESSMENT
64 Female hx CAD (Cath Feb '19 showing 99% RCA, 100% RPLS, 100% Cx) s/p multiple stents/brachytherapy, ESRD (on HD M/T/T/Sa), DM1 c/b neuropathy and retinopathy, CHF, mod MR, severe AS, RHF, TIA, severe PVD s/p b/l fempop bypass, left subclavian vein stenosis s/p stent, COPD not on O2 pw cc of LE swelling and ct scan suggestive of CHF. also with hyperkalemia and thrombophlebitis     1- esrd  2- hyperkalemia improved   3- pulm edema  4- htn      hd and uf in am given her cp and arrhythmia   cont with 25 mg bid and lisinopril 40 mg qd  shpt cont with renvela

## 2019-07-30 NOTE — PROVIDER CONTACT NOTE (OTHER) - BACKGROUND
Pt admitted with BL LE edema. Pt a diabetic, went for dialysis yesterday with 2.5 L removed. Pt had + troponin 233 overnight but had no complaints of chest pain. Pt has been NSR on tele.

## 2019-07-31 LAB
ANION GAP SERPL CALC-SCNC: 18 MMOL/L — HIGH (ref 5–17)
ANION GAP SERPL CALC-SCNC: 20 MMOL/L — HIGH (ref 5–17)
B-OH-BUTYR SERPL-SCNC: 0 MMOL/L — SIGNIFICANT CHANGE UP
BUN SERPL-MCNC: 22 MG/DL — SIGNIFICANT CHANGE UP (ref 7–23)
BUN SERPL-MCNC: 36 MG/DL — HIGH (ref 7–23)
CALCIUM SERPL-MCNC: 9.4 MG/DL — SIGNIFICANT CHANGE UP (ref 8.4–10.5)
CALCIUM SERPL-MCNC: 9.7 MG/DL — SIGNIFICANT CHANGE UP (ref 8.4–10.5)
CHLORIDE SERPL-SCNC: 89 MMOL/L — LOW (ref 96–108)
CHLORIDE SERPL-SCNC: 91 MMOL/L — LOW (ref 96–108)
CO2 SERPL-SCNC: 26 MMOL/L — SIGNIFICANT CHANGE UP (ref 22–31)
CO2 SERPL-SCNC: 30 MMOL/L — SIGNIFICANT CHANGE UP (ref 22–31)
CREAT SERPL-MCNC: 3.96 MG/DL — HIGH (ref 0.5–1.3)
CREAT SERPL-MCNC: 5.27 MG/DL — HIGH (ref 0.5–1.3)
GLUCOSE BLDC GLUCOMTR-MCNC: 105 MG/DL — HIGH (ref 70–99)
GLUCOSE BLDC GLUCOMTR-MCNC: 304 MG/DL — HIGH (ref 70–99)
GLUCOSE BLDC GLUCOMTR-MCNC: 360 MG/DL — HIGH (ref 70–99)
GLUCOSE BLDC GLUCOMTR-MCNC: 410 MG/DL — HIGH (ref 70–99)
GLUCOSE BLDC GLUCOMTR-MCNC: 425 MG/DL — HIGH (ref 70–99)
GLUCOSE BLDC GLUCOMTR-MCNC: 447 MG/DL — HIGH (ref 70–99)
GLUCOSE BLDC GLUCOMTR-MCNC: 469 MG/DL — CRITICAL HIGH (ref 70–99)
GLUCOSE BLDC GLUCOMTR-MCNC: 90 MG/DL — SIGNIFICANT CHANGE UP (ref 70–99)
GLUCOSE SERPL-MCNC: 404 MG/DL — HIGH (ref 70–99)
GLUCOSE SERPL-MCNC: 86 MG/DL — SIGNIFICANT CHANGE UP (ref 70–99)
HCT VFR BLD CALC: 31.9 % — LOW (ref 34.5–45)
HGB BLD-MCNC: 9.9 G/DL — LOW (ref 11.5–15.5)
MAGNESIUM SERPL-MCNC: 2.3 MG/DL — SIGNIFICANT CHANGE UP (ref 1.6–2.6)
MCHC RBC-ENTMCNC: 31 GM/DL — LOW (ref 32–36)
MCHC RBC-ENTMCNC: 34 PG — SIGNIFICANT CHANGE UP (ref 27–34)
MCV RBC AUTO: 109.6 FL — HIGH (ref 80–100)
PHOSPHATE SERPL-MCNC: 6.9 MG/DL — HIGH (ref 2.5–4.5)
PLATELET # BLD AUTO: 194 K/UL — SIGNIFICANT CHANGE UP (ref 150–400)
POTASSIUM SERPL-MCNC: 4.1 MMOL/L — SIGNIFICANT CHANGE UP (ref 3.5–5.3)
POTASSIUM SERPL-MCNC: 5.4 MMOL/L — HIGH (ref 3.5–5.3)
POTASSIUM SERPL-SCNC: 4.1 MMOL/L — SIGNIFICANT CHANGE UP (ref 3.5–5.3)
POTASSIUM SERPL-SCNC: 5.4 MMOL/L — HIGH (ref 3.5–5.3)
RBC # BLD: 2.91 M/UL — LOW (ref 3.8–5.2)
RBC # FLD: 13.7 % — SIGNIFICANT CHANGE UP (ref 10.3–14.5)
SODIUM SERPL-SCNC: 135 MMOL/L — SIGNIFICANT CHANGE UP (ref 135–145)
SODIUM SERPL-SCNC: 139 MMOL/L — SIGNIFICANT CHANGE UP (ref 135–145)
WBC # BLD: 5.6 K/UL — SIGNIFICANT CHANGE UP (ref 3.8–10.5)
WBC # FLD AUTO: 5.6 K/UL — SIGNIFICANT CHANGE UP (ref 3.8–10.5)

## 2019-07-31 PROCEDURE — 93010 ELECTROCARDIOGRAM REPORT: CPT

## 2019-07-31 PROCEDURE — 99232 SBSQ HOSP IP/OBS MODERATE 35: CPT

## 2019-07-31 RX ORDER — INSULIN LISPRO 100/ML
2 VIAL (ML) SUBCUTANEOUS AT BEDTIME
Refills: 0 | Status: DISCONTINUED | OUTPATIENT
Start: 2019-07-31 | End: 2019-08-05

## 2019-07-31 RX ORDER — INSULIN GLARGINE 100 [IU]/ML
6 INJECTION, SOLUTION SUBCUTANEOUS AT BEDTIME
Refills: 0 | Status: DISCONTINUED | OUTPATIENT
Start: 2019-07-31 | End: 2019-08-02

## 2019-07-31 RX ORDER — INSULIN HUMAN 100 [IU]/ML
2 INJECTION, SOLUTION SUBCUTANEOUS AT BEDTIME
Refills: 0 | Status: DISCONTINUED | OUTPATIENT
Start: 2019-07-31 | End: 2019-07-31

## 2019-07-31 RX ORDER — FUROSEMIDE 40 MG
0 TABLET ORAL
Qty: 0 | Refills: 0 | DISCHARGE

## 2019-07-31 RX ORDER — HUMAN INSULIN 100 [IU]/ML
3 INJECTION, SUSPENSION SUBCUTANEOUS ONCE
Refills: 0 | Status: COMPLETED | OUTPATIENT
Start: 2019-07-31 | End: 2019-07-31

## 2019-07-31 RX ORDER — INSULIN LISPRO 100/ML
VIAL (ML) SUBCUTANEOUS
Refills: 0 | Status: DISCONTINUED | OUTPATIENT
Start: 2019-07-31 | End: 2019-08-05

## 2019-07-31 RX ORDER — INSULIN LISPRO 100/ML
VIAL (ML) SUBCUTANEOUS AT BEDTIME
Refills: 0 | Status: DISCONTINUED | OUTPATIENT
Start: 2019-07-31 | End: 2019-07-31

## 2019-07-31 RX ORDER — METOPROLOL TARTRATE 50 MG
25 TABLET ORAL DAILY
Refills: 0 | Status: DISCONTINUED | OUTPATIENT
Start: 2019-07-31 | End: 2019-08-01

## 2019-07-31 RX ADMIN — Medication 1 TABLET(S): at 12:58

## 2019-07-31 RX ADMIN — Medication 81 MILLIGRAM(S): at 13:01

## 2019-07-31 RX ADMIN — Medication 3: at 17:50

## 2019-07-31 RX ADMIN — Medication 25 MILLIGRAM(S): at 20:53

## 2019-07-31 RX ADMIN — PANTOPRAZOLE SODIUM 40 MILLIGRAM(S): 20 TABLET, DELAYED RELEASE ORAL at 05:31

## 2019-07-31 RX ADMIN — OXYCODONE AND ACETAMINOPHEN 2 TABLET(S): 5; 325 TABLET ORAL at 13:00

## 2019-07-31 RX ADMIN — Medication 5: at 09:41

## 2019-07-31 RX ADMIN — Medication 2: at 22:00

## 2019-07-31 RX ADMIN — Medication 3 UNIT(S): at 12:57

## 2019-07-31 RX ADMIN — HUMAN INSULIN 3 UNIT(S): 100 INJECTION, SUSPENSION SUBCUTANEOUS at 09:42

## 2019-07-31 RX ADMIN — Medication 6: at 12:56

## 2019-07-31 RX ADMIN — INSULIN GLARGINE 6 UNIT(S): 100 INJECTION, SOLUTION SUBCUTANEOUS at 21:59

## 2019-07-31 RX ADMIN — LISINOPRIL 40 MILLIGRAM(S): 2.5 TABLET ORAL at 05:17

## 2019-07-31 RX ADMIN — Medication 3 MILLILITER(S): at 05:17

## 2019-07-31 RX ADMIN — SEVELAMER CARBONATE 800 MILLIGRAM(S): 2400 POWDER, FOR SUSPENSION ORAL at 09:42

## 2019-07-31 RX ADMIN — Medication 25 MILLIGRAM(S): at 05:17

## 2019-07-31 RX ADMIN — OXYCODONE AND ACETAMINOPHEN 2 TABLET(S): 5; 325 TABLET ORAL at 08:31

## 2019-07-31 RX ADMIN — OXYCODONE AND ACETAMINOPHEN 2 TABLET(S): 5; 325 TABLET ORAL at 05:15

## 2019-07-31 RX ADMIN — ATORVASTATIN CALCIUM 20 MILLIGRAM(S): 80 TABLET, FILM COATED ORAL at 20:54

## 2019-07-31 RX ADMIN — CLOPIDOGREL BISULFATE 75 MILLIGRAM(S): 75 TABLET, FILM COATED ORAL at 13:01

## 2019-07-31 RX ADMIN — SEVELAMER CARBONATE 800 MILLIGRAM(S): 2400 POWDER, FOR SUSPENSION ORAL at 20:49

## 2019-07-31 RX ADMIN — Medication 3 UNIT(S): at 22:00

## 2019-07-31 RX ADMIN — SEVELAMER CARBONATE 800 MILLIGRAM(S): 2400 POWDER, FOR SUSPENSION ORAL at 12:57

## 2019-07-31 RX ADMIN — OXYCODONE AND ACETAMINOPHEN 2 TABLET(S): 5; 325 TABLET ORAL at 14:00

## 2019-07-31 RX ADMIN — Medication 50 MILLIGRAM(S): at 05:17

## 2019-07-31 RX ADMIN — Medication 3 MILLILITER(S): at 13:01

## 2019-07-31 RX ADMIN — Medication 3 MILLILITER(S): at 20:49

## 2019-07-31 RX ADMIN — Medication 3 UNIT(S): at 09:41

## 2019-07-31 RX ADMIN — Medication 25 MILLIGRAM(S): at 18:50

## 2019-07-31 NOTE — PROGRESS NOTE ADULT - PROBLEM SELECTOR PLAN 1
-Test BG ac and hs and 2am  -Lantus 6 units q hs  -Humalog 3 units ac meals.  -Continue Humalog low dose correction scales ac and hs and add 2am low scale (2-3Units) for overnight hyperglycemia.  -Add Humalog 2 units at hs if pt eating snack. Hold if not eating.  -Plan discussed with pt/team NP.  Contact info: [phone] (24/7). pager [phone] -Test BG ac and hs and 2am  -Lantus 6 units q hs  -Humalog 3 units ac meals.  -Continue Humalog low dose correction scales ac and hs and add 2am low scale (1-3Units) for overnight hyperglycemia.  -Add Humalog 2 units at hs if pt eating snack. Hold if not eating.  -Plan discussed with pt/team NP.  Contact info: [phone] (24/7). pager [phone]

## 2019-07-31 NOTE — PROGRESS NOTE ADULT - ASSESSMENT
64 f with    ESRD/ fluid overload  - HD  - Nephrology follow Dr. Schmidt    DM type 1  - ADA diet  - BS control  - endocrine follow    CHF cr systolic  - continue Rx  - cardiology follow    CAD/ s/p NSTEMI  - continue Rx  - cardiology follow    COPD  - nebs    PVD  - conservative Rx  Anxiety/ Depression control  Pierce Viera MD pager [phone]

## 2019-07-31 NOTE — PROGRESS NOTE ADULT - ASSESSMENT
62 y/o M w/h/o uncontrolled TIDM (HbA1c 8.2% 6/19) c/b neuropathy and retinopathy as well as CAD s/p multiple stents, CHF (EF 50% 10/2018), TIA, PVD s/p b/l fem-pop bypass, ESRD> HD M/T/Th/Sat, presenting with worsening SOB and LE edema. Found to have CHF exacerbation/fluid retention/hyperkalemia and thrombophlebitis. Had HD yesterday and going again today. Tolerating POs with frequent nutritional indiscretions and glycemic control above goal. No hypoglycemia. Increased NPH dose to 3 units this AM. Also increased Lantus dose at night. During recent admission pt required up to 7 units of Lantus at hs. Will also add low dose Humalog for HS snack and correction scale at 2am for hyperglycemia. Pt's family manages her DM at home since she is forgetful and has trouble adherent to DM care.

## 2019-07-31 NOTE — PROGRESS NOTE ADULT - SUBJECTIVE AND OBJECTIVE BOX
DIABETES FOLLOW UP NOTE: Saw pt earlier today  INTERVAL HX: 64 y/o M w/h/o uncontrolled TIDM (HbA1c 8.2% 6/19) c/b neuropathy and retinopathy as well as CAD s/p multiple stents, CHF (EF 50% 10/2018), TIA, PVD s/p b/l fem-pop bypass, ESRD> HD M/T/Th/Sat, presenting with worsening SOB and LE edema. Reports that she has not received HD 4x/week but 3x/week only for the last 2 weeks. Reports tolerating POs but still eating at different times of the day and night>likes to snack at night. Glycemic control remains uncontrolled with BG >300s by POC and >400s in BMP this am. Increased NPH dose to 3 units this AM. No hypoglycemia. Denies SOB and reports improved edema in LEs.       Review of Systems:  General: As above  Cardiovascular: No chest pain, palpitations  Respiratory: No SOB, no cough  GI: No nausea, vomiting, abdominal pain  Endocrine: no polyuria, polydipsia or S&Sx of hypoglycemia    Allergies    No Known Allergies    Intolerances      MEDICATIONS:  atorvastatin 20 milliGRAM(s) Oral at bedtime  insulin glargine Injectable (LANTUS) 6 Unit(s) SubCutaneous at bedtime  insulin lispro (HumaLOG) corrective regimen sliding scale   SubCutaneous three times a day before meals  insulin lispro (HumaLOG) corrective regimen sliding scale   SubCutaneous at bedtime  insulin lispro Injectable (HumaLOG) 3 Unit(s) SubCutaneous three times a day before meals      PHYSICAL EXAM:  VITALS: T(C): 36.6 (07-31-19 @ 05:12)  T(F): 97.9 (07-31-19 @ 05:12), Max: 98 (07-30-19 @ 15:32)  HR: 75 (07-31-19 @ 05:12) (75 - 83)  BP: 138/68 (07-31-19 @ 05:12) (135/74 - 151/68)  RR:  (16 - 18)  SpO2:  (98% - 100%)  Wt(kg): --  GENERAL: Female sitting at edge of bed in NAD  Abdomen: Soft, nontender, non distended  Extremities: Warm, 1+ edema in LEs L>R  NEURO: A&O X3    LABS:  POCT Blood Glucose.: 360 mg/dL (07-31-19 @ 09:10)  POCT Blood Glucose.: 282 mg/dL (07-30-19 @ 22:22)  POCT Blood Glucose.: 292 mg/dL (07-30-19 @ 17:42)  POCT Blood Glucose.: 223 mg/dL (07-30-19 @ 13:07)  POCT Blood Glucose.: 408 mg/dL (07-30-19 @ 10:09)  POCT Blood Glucose.: 459 mg/dL (07-30-19 @ 09:02)  POCT Blood Glucose.: 437 mg/dL (07-30-19 @ 08:57)  POCT Blood Glucose.: 150 mg/dL (07-29-19 @ 23:55)                            9.9    5.60  )-----------( 194      ( 31 Jul 2019 08:03 )             31.9       07-31    135  |  89<L>  |  36<H>  ----------------------------<  404<H>  5.4<H>   |  26  |  5.27<H>    EGFR if : 9<L>  EGFR if non : 8<L>    Ca    9.4      07-31  Mg     2.3     07-31  Phos  6.9     07-31    TPro  6.7  /  Alb  3.9  /  TBili  0.4  /  DBili  x   /  AST  32  /  ALT  22  /  AlkPhos  66  07-29      Hemoglobin A1C, Whole Blood: 8.2 % <H> [4.0 - 5.6] (06-16-19 @ 13:24)

## 2019-07-31 NOTE — PROGRESS NOTE ADULT - SUBJECTIVE AND OBJECTIVE BOX
Patient is a 64y old  Female who presents with a chief complaint of sob (31 Jul 2019 12:04)      SUBJECTIVE / OVERNIGHT EVENTS: weak, tired after HD  Review of Systems  chest pain no  palpitations no  sob no  nausea no  headache no    MEDICATIONS  (STANDING):  ALBUTerol/ipratropium for Nebulization 3 milliLiter(s) Nebulizer every 6 hours  aspirin enteric coated 81 milliGRAM(s) Oral daily  atorvastatin 20 milliGRAM(s) Oral at bedtime  clopidogrel Tablet 75 milliGRAM(s) Oral daily  dextrose 5%. 1000 milliLiter(s) (50 mL/Hr) IV Continuous <Continuous>  dextrose 50% Injectable 12.5 Gram(s) IV Push once  dextrose 50% Injectable 25 Gram(s) IV Push once  dextrose 50% Injectable 25 Gram(s) IV Push once  docusate sodium 100 milliGRAM(s) Oral three times a day  heparin  Injectable 5000 Unit(s) SubCutaneous every 12 hours  hydrALAZINE 25 milliGRAM(s) Oral three times a day  insulin glargine Injectable (LANTUS) 6 Unit(s) SubCutaneous at bedtime  insulin lispro (HumaLOG) corrective regimen sliding scale   SubCutaneous <User Schedule>  insulin lispro (HumaLOG) corrective regimen sliding scale   SubCutaneous three times a day before meals  insulin lispro (HumaLOG) corrective regimen sliding scale   SubCutaneous at bedtime  insulin lispro Injectable (HumaLOG) 3 Unit(s) SubCutaneous three times a day before meals  insulin lispro Injectable (HumaLOG) 2 Unit(s) SubCutaneous at bedtime  lisinopril 40 milliGRAM(s) Oral daily  metoprolol succinate ER 50 milliGRAM(s) Oral daily  metoprolol succinate ER 25 milliGRAM(s) Oral daily  multivitamin 1 Tablet(s) Oral daily  pantoprazole    Tablet 40 milliGRAM(s) Oral before breakfast  sevelamer carbonate 800 milliGRAM(s) Oral three times a day with meals    MEDICATIONS  (PRN):  acetaminophen   Tablet .. 650 milliGRAM(s) Oral every 6 hours PRN Temp greater or equal to 38.5C (101.3F), Mild Pain (1 - 3)  dextrose 40% Gel 15 Gram(s) Oral once PRN Blood Glucose LESS THAN 70 milliGRAM(s)/deciliter  glucagon  Injectable 1 milliGRAM(s) IntraMuscular once PRN Glucose LESS THAN 70 milligrams/deciliter  oxyCODONE    5 mG/acetaminophen 325 mG 2 Tablet(s) Oral every 6 hours PRN Moderate Pain (4 - 6)  senna 2 Tablet(s) Oral at bedtime PRN Constipation      Vital Signs Last 24 Hrs  T(C): 36.4 (31 Jul 2019 16:40), Max: 36.6 (31 Jul 2019 05:12)  T(F): 97.5 (31 Jul 2019 16:40), Max: 97.9 (31 Jul 2019 05:12)  HR: 71 (31 Jul 2019 17:53) (71 - 100)  BP: 133/74 (31 Jul 2019 17:53) (133/74 - 142/87)  BP(mean): --  RR: 18 (31 Jul 2019 16:40) (16 - 18)  SpO2: 100% (31 Jul 2019 16:40) (99% - 100%)    PHYSICAL EXAM:  GENERAL: NAD, well-developed  HEAD:  Atraumatic, Normocephalic  EYES: EOMI, PERRLA, conjunctiva and sclera clear  NECK: Supple, No JVD  CHEST/LUNG: Clear to auscultation bilaterally; No wheeze  HEART: Regular rate and rhythm; No murmurs, rubs, or gallops  ABDOMEN: Soft, Nontender, Nondistended; Bowel sounds present  EXTREMITIES:  1+bl edema  PSYCH: AAOx3  NEUROLOGY: non-focal  SKIN: No rashes or lesions    LABS:                        9.9    5.60  )-----------( 194      ( 31 Jul 2019 08:03 )             31.9     07-31    139  |  91<L>  |  22  ----------------------------<  86  4.1   |  30  |  3.96<H>    Ca    9.7      31 Jul 2019 19:10  Phos  6.9     07-31  Mg     2.3     07-31        CARDIAC MARKERS ( 30 Jul 2019 01:25 )  x     / x     / 401 U/L / x     / 7.4 ng/mL            RADIOLOGY & ADDITIONAL TESTS:    Imaging Personally Reviewed:    Consultant(s) Notes Reviewed:      Care Discussed with Consultants/Other Providers:

## 2019-07-31 NOTE — PROGRESS NOTE ADULT - ASSESSMENT
64 Female hx CAD (Cath Feb '19 showing 99% RCA, 100% RPLS, 100% Cx) s/p multiple stents/brachytherapy, ESRD (on HD M/T/T/Sa), DM1 c/b neuropathy and retinopathy, CHF, mod MR, severe AS, RHF, TIA, severe PVD s/p b/l fempop bypass, left subclavian vein stenosis s/p stent, COPD not on O2 pw cc of LE swelling and ct scan suggestive of CHF. also with hyperkalemia and thrombophlebitis     1- esrd  2- htn  3- pulm edema      hd uf 60 min 1 liter then 3.5 hr 2.5 additional fluid removal   see hd flow sheet

## 2019-07-31 NOTE — PROGRESS NOTE ADULT - SUBJECTIVE AND OBJECTIVE BOX
[facility]   [phone]  DIALYSIS NOTE  Chief Complaint: ESRD/Ongoing hemodialysis requirement.    24 hour events/subjective:    no new c/o except edema         ALLERGIES & MEDICATIONS  --------------------------------------------------------------------------------  Allergies    No Known Allergies    Intolerances      Standing Inpatient Medications  ALBUTerol/ipratropium for Nebulization 3 milliLiter(s) Nebulizer every 6 hours  aspirin enteric coated 81 milliGRAM(s) Oral daily  atorvastatin 20 milliGRAM(s) Oral at bedtime  clopidogrel Tablet 75 milliGRAM(s) Oral daily  dextrose 5%. 1000 milliLiter(s) IV Continuous <Continuous>  dextrose 50% Injectable 12.5 Gram(s) IV Push once  dextrose 50% Injectable 25 Gram(s) IV Push once  dextrose 50% Injectable 25 Gram(s) IV Push once  docusate sodium 100 milliGRAM(s) Oral three times a day  heparin  Injectable 5000 Unit(s) SubCutaneous every 12 hours  hydrALAZINE 25 milliGRAM(s) Oral three times a day  insulin glargine Injectable (LANTUS) 6 Unit(s) SubCutaneous at bedtime  insulin lispro (HumaLOG) corrective regimen sliding scale   SubCutaneous <User Schedule>  insulin lispro (HumaLOG) corrective regimen sliding scale   SubCutaneous three times a day before meals  insulin lispro (HumaLOG) corrective regimen sliding scale   SubCutaneous at bedtime  insulin lispro Injectable (HumaLOG) 3 Unit(s) SubCutaneous three times a day before meals  insulin lispro Injectable (HumaLOG) 2 Unit(s) SubCutaneous at bedtime  lisinopril 40 milliGRAM(s) Oral daily  metoprolol succinate ER 50 milliGRAM(s) Oral daily  metoprolol succinate ER 25 milliGRAM(s) Oral daily  multivitamin 1 Tablet(s) Oral daily  pantoprazole    Tablet 40 milliGRAM(s) Oral before breakfast  sevelamer carbonate 800 milliGRAM(s) Oral three times a day with meals    PRN Inpatient Medications  acetaminophen   Tablet .. 650 milliGRAM(s) Oral every 6 hours PRN  dextrose 40% Gel 15 Gram(s) Oral once PRN  glucagon  Injectable 1 milliGRAM(s) IntraMuscular once PRN  oxyCODONE    5 mG/acetaminophen 325 mG 2 Tablet(s) Oral every 6 hours PRN  senna 2 Tablet(s) Oral at bedtime PRN      REVIEW OF SYSTEMS  --------------------------------------------------------------------------------  no itching or rash  no fever or chill  no cp or palp   no sob or cough   no N/V/D/ no abd pain   ext  + edema         VITALS/PHYSICAL EXAM  --------------------------------------------------------------------------------  T(C): 36.8 (07-31-19 @ 20:17), Max: 36.8 (07-31-19 @ 20:17)  HR: 72 (07-31-19 @ 20:17) (71 - 100)  BP: 100/52 (07-31-19 @ 20:17) (100/52 - 142/87)  RR: 18 (07-31-19 @ 20:17) (16 - 18)  SpO2: 96% (07-31-19 @ 20:17) (96% - 100%)  Wt(kg): --        07-31-19 @ 07:01  -  07-31-19 @ 21:45  --------------------------------------------------------  IN: 120 mL / OUT: 0 mL / NET: 120 mL      Physical Exam:  		  	Gen: chronically ill appearing F    	no jvd , supple neck,   	Pulm: decrease bs  no rales or ronchi or wheezing  	CV: RRR, S1S2; no rub  	Abd: +BS, soft, nontender/nondistended  	: No suprapubic tenderness  	UE: Warm, no cyanosis  no clubbing,  no edema; no asterixis + RUE thrombophlebitis   	LE: Warm, no cyanosis  no clubbing, 3+  edema  	Neuro: alert and oriented. speech coherent   	+ avf + bruit/thrill 	    	  LABS/STUDIES  --------------------------------------------------------------------------------              9.9    5.60  >-----------<  194      [07-31-19 @ 08:03]              31.9     139  |  91  |  22  ----------------------------<  86      [07-31-19 @ 19:10]  4.1   |  30  |  3.96        Ca     9.7     [07-31-19 @ 19:10]      Mg     2.3     [07-31-19 @ 06:26]      Phos  6.9     [07-31-19 @ 06:26]                [07-30-19 @ 01:25]            imp/suggest: ESRD      Hemodialysis Prescription:  	Access:  	Dialyzer: revaclear   	Blood Flow (mL/Min): 400  	Dialysate Flow (mL/Min): 600  	Target UF (Liters):  	Treatment Time:  	Potassium:   	Calcium: 2.5  	  YOLANDA    Vitamin D     continue with hd   see hd flow sheet stretcher

## 2019-07-31 NOTE — PHARMACOTHERAPY INTERVENTION NOTE - COMMENTS
Confirmed home medications with patient and pharmacy, updated in Outpatient Medication Review. Discrepancies communicated with NP - Patient was taking Hydralazine 100mg TID (here, on 25mg TID), and Sevelamer 1600mg TID with meals (here, on 800mg TID).    Blanca Bardales, PharmD  PGY1 Pharmacy Practice Resident  [phone]

## 2019-07-31 NOTE — CHART NOTE - NSCHARTNOTEFT_GEN_A_CORE
Notified by Rn : pt converted to Atach at 5:50am - 7:10am, 110 heart rate   - Pt see and examined, asymptomatic, denies chest pain, sob, dizziness   -pt is alert and oriented   - Ekg ordered , vitals stable   - Cont Toprol 50xl daily   - D/W  , + Toprol 25mg po at 6pm daily   - Will cont to monitor

## 2019-07-31 NOTE — PROGRESS NOTE ADULT - SUBJECTIVE AND OBJECTIVE BOX
Cardiovascular Disease Progress Note    Overnight events: No acute events overnight.  10 beats nsvt on tele. chest pain resolved overnight. denies any new cardiac sx  Otherwise review of systems negative    Objective Findings:  T(C): 36.6 (19 @ 05:12), Max: 36.7 (19 @ 08:00)  HR: 75 (19 @ 05:12) (75 - 92)  BP: 138/68 (19 @ 05:12) (103/57 - 151/68)  RR: 16 (19 @ 05:12) (16 - 18)  SpO2: 100% (19 @ 05:12) (98% - 100%)  Wt(kg): --  Daily     Daily Weight in k.4 (2019 07:31)      Physical Exam:  Gen: NAD  HEENT: EOMI  CV: RRR, normal S1 + S2, no m/r/g  Lungs: CTAB  Abd: soft, non-tender  Ext: No edema    Telemetry: sr 10 beats nsvt    Laboratory Data:                        11.2   5.58  )-----------( 231      ( 2019 08:26 )             37.1         135  |  89<L>  |  36<H>  ----------------------------<  404<H>  5.4<H>   |  26  |  5.27<H>    Ca    9.4      2019 06:26  Phos  6.9       Mg     2.3         TPro  6.7  /  Alb  3.9  /  TBili  0.4  /  DBili  x   /  AST  32  /  ALT  22  /  AlkPhos  66      PT/INR - ( 2019 16:55 )   PT: 11.4 sec;   INR: 1.00 ratio         PTT - ( 2019 16:55 )  PTT:22.8 sec  CARDIAC MARKERS ( 2019 01:25 )  x     / x     / 401 U/L / x     / 7.4 ng/mL          Inpatient Medications:  MEDICATIONS  (STANDING):  ALBUTerol/ipratropium for Nebulization 3 milliLiter(s) Nebulizer every 6 hours  aspirin enteric coated 81 milliGRAM(s) Oral daily  atorvastatin 20 milliGRAM(s) Oral at bedtime  clopidogrel Tablet 75 milliGRAM(s) Oral daily  dextrose 5%. 1000 milliLiter(s) (50 mL/Hr) IV Continuous <Continuous>  dextrose 50% Injectable 12.5 Gram(s) IV Push once  dextrose 50% Injectable 25 Gram(s) IV Push once  dextrose 50% Injectable 25 Gram(s) IV Push once  docusate sodium 100 milliGRAM(s) Oral three times a day  heparin  Injectable 5000 Unit(s) SubCutaneous every 12 hours  hydrALAZINE 25 milliGRAM(s) Oral three times a day  insulin glargine Injectable (LANTUS) 5 Unit(s) SubCutaneous at bedtime  insulin lispro (HumaLOG) corrective regimen sliding scale   SubCutaneous three times a day before meals  insulin lispro (HumaLOG) corrective regimen sliding scale   SubCutaneous at bedtime  insulin lispro Injectable (HumaLOG) 3 Unit(s) SubCutaneous three times a day before meals  insulin NPH human recombinant 2 Unit(s) SubCutaneous before breakfast  lisinopril 40 milliGRAM(s) Oral daily  metoprolol succinate ER 50 milliGRAM(s) Oral daily  multivitamin 1 Tablet(s) Oral daily  pantoprazole    Tablet 40 milliGRAM(s) Oral before breakfast  sevelamer carbonate 800 milliGRAM(s) Oral three times a day with meals      Assessment:  -chest pain with mild ekg changes and stable hs trop  -CHF exacerbation  -NSVT  -pAT  -volume overload  -ischemic cardiomyopathy, cad s/p multiple stents  -esrd on hd  -PAD s/p prior intervention         Recs:  -chest pain with known extensive CAD and ICM. s/p [facility] 2019 --> severe and diffuse instent restenosis along RCA --> unable to stent due to multiple layers of stenting and prior brachytherapy. would consider complex intervention if true ischemic sx develop that dont respond to medical therapy. cont with aggressive medical and anti-anginal therapy for now with anti-platelet, statins and metop, hydral and lisinopril. dr batres recommendations appreciated  -c/w beta blockers for nsvt/pat/cad (as above)  -hx of prolonged qtc. avoid qtc prolonging meds  -s/p [facility]TE 2019: mod-severe MR, pseudo AS (low flow-low gradient), mod-severe LV dysfunction --> recent CTS consult with dr hebert appreciated. plan is for medical management given surgical risk and patient preference  -c/w asa, plavix, statin for ischemic cardiomyopathy/CAD/PAD  -dvt ppx          Over 25 minutes spent on total encounter; more than 50% of the visit was spent counseling and/or coordinating care by the attending physician.      Julián Biswas MD   Cardiovascular Disease  [phone]

## 2019-08-01 DIAGNOSIS — E78.5 HYPERLIPIDEMIA, UNSPECIFIED: ICD-10-CM

## 2019-08-01 LAB
ANION GAP SERPL CALC-SCNC: 16 MMOL/L — SIGNIFICANT CHANGE UP (ref 5–17)
BUN SERPL-MCNC: 27 MG/DL — HIGH (ref 7–23)
CALCIUM SERPL-MCNC: 9.2 MG/DL — SIGNIFICANT CHANGE UP (ref 8.4–10.5)
CHLORIDE SERPL-SCNC: 91 MMOL/L — LOW (ref 96–108)
CO2 SERPL-SCNC: 26 MMOL/L — SIGNIFICANT CHANGE UP (ref 22–31)
CREAT SERPL-MCNC: 4.63 MG/DL — HIGH (ref 0.5–1.3)
GLUCOSE BLDC GLUCOMTR-MCNC: 115 MG/DL — HIGH (ref 70–99)
GLUCOSE BLDC GLUCOMTR-MCNC: 206 MG/DL — HIGH (ref 70–99)
GLUCOSE BLDC GLUCOMTR-MCNC: 276 MG/DL — HIGH (ref 70–99)
GLUCOSE BLDC GLUCOMTR-MCNC: 322 MG/DL — HIGH (ref 70–99)
GLUCOSE BLDC GLUCOMTR-MCNC: 366 MG/DL — HIGH (ref 70–99)
GLUCOSE BLDC GLUCOMTR-MCNC: 53 MG/DL — LOW (ref 70–99)
GLUCOSE BLDC GLUCOMTR-MCNC: 57 MG/DL — LOW (ref 70–99)
GLUCOSE BLDC GLUCOMTR-MCNC: 58 MG/DL — LOW (ref 70–99)
GLUCOSE BLDC GLUCOMTR-MCNC: 59 MG/DL — LOW (ref 70–99)
GLUCOSE BLDC GLUCOMTR-MCNC: 61 MG/DL — LOW (ref 70–99)
GLUCOSE BLDC GLUCOMTR-MCNC: 61 MG/DL — LOW (ref 70–99)
GLUCOSE BLDC GLUCOMTR-MCNC: 76 MG/DL — SIGNIFICANT CHANGE UP (ref 70–99)
GLUCOSE BLDC GLUCOMTR-MCNC: 77 MG/DL — SIGNIFICANT CHANGE UP (ref 70–99)
GLUCOSE BLDC GLUCOMTR-MCNC: 79 MG/DL — SIGNIFICANT CHANGE UP (ref 70–99)
GLUCOSE BLDC GLUCOMTR-MCNC: 84 MG/DL — SIGNIFICANT CHANGE UP (ref 70–99)
GLUCOSE SERPL-MCNC: 208 MG/DL — HIGH (ref 70–99)
HBV CORE AB SER-ACNC: REACTIVE
HBV SURFACE AB SER-ACNC: <3 MIU/ML — LOW
HBV SURFACE AB SER-ACNC: SIGNIFICANT CHANGE UP
HBV SURFACE AG SER-ACNC: SIGNIFICANT CHANGE UP
HCV AB S/CO SERPL IA: 0.11 S/CO — SIGNIFICANT CHANGE UP (ref 0–0.99)
HCV AB SERPL-IMP: SIGNIFICANT CHANGE UP
POTASSIUM SERPL-MCNC: 5.3 MMOL/L — SIGNIFICANT CHANGE UP (ref 3.5–5.3)
POTASSIUM SERPL-SCNC: 5.3 MMOL/L — SIGNIFICANT CHANGE UP (ref 3.5–5.3)
SODIUM SERPL-SCNC: 133 MMOL/L — LOW (ref 135–145)

## 2019-08-01 PROCEDURE — 99233 SBSQ HOSP IP/OBS HIGH 50: CPT

## 2019-08-01 RX ORDER — METOPROLOL TARTRATE 50 MG
25 TABLET ORAL ONCE
Refills: 0 | Status: COMPLETED | OUTPATIENT
Start: 2019-08-01 | End: 2019-08-01

## 2019-08-01 RX ORDER — SODIUM CHLORIDE 9 MG/ML
1000 INJECTION, SOLUTION INTRAVENOUS
Refills: 0 | Status: DISCONTINUED | OUTPATIENT
Start: 2019-08-01 | End: 2019-08-02

## 2019-08-01 RX ORDER — DEXTROSE 50 % IN WATER 50 %
12.5 SYRINGE (ML) INTRAVENOUS ONCE
Refills: 0 | Status: COMPLETED | OUTPATIENT
Start: 2019-08-01 | End: 2019-08-01

## 2019-08-01 RX ORDER — METOPROLOL TARTRATE 50 MG
50 TABLET ORAL EVERY 12 HOURS
Refills: 0 | Status: DISCONTINUED | OUTPATIENT
Start: 2019-08-01 | End: 2019-08-01

## 2019-08-01 RX ORDER — METOPROLOL TARTRATE 50 MG
50 TABLET ORAL EVERY 12 HOURS
Refills: 0 | Status: DISCONTINUED | OUTPATIENT
Start: 2019-08-02 | End: 2019-08-08

## 2019-08-01 RX ADMIN — Medication 3 UNIT(S): at 08:47

## 2019-08-01 RX ADMIN — Medication 3 UNIT(S): at 12:51

## 2019-08-01 RX ADMIN — SEVELAMER CARBONATE 800 MILLIGRAM(S): 2400 POWDER, FOR SUSPENSION ORAL at 08:48

## 2019-08-01 RX ADMIN — Medication 25 MILLIGRAM(S): at 05:02

## 2019-08-01 RX ADMIN — Medication 5: at 12:51

## 2019-08-01 RX ADMIN — SODIUM CHLORIDE 50 MILLILITER(S): 9 INJECTION, SOLUTION INTRAVENOUS at 23:10

## 2019-08-01 RX ADMIN — OXYCODONE AND ACETAMINOPHEN 2 TABLET(S): 5; 325 TABLET ORAL at 23:16

## 2019-08-01 RX ADMIN — Medication 81 MILLIGRAM(S): at 12:51

## 2019-08-01 RX ADMIN — Medication 1 TABLET(S): at 12:51

## 2019-08-01 RX ADMIN — Medication 12.5 GRAM(S): at 02:27

## 2019-08-01 RX ADMIN — Medication 25 MILLIGRAM(S): at 12:51

## 2019-08-01 RX ADMIN — Medication 3 MILLILITER(S): at 23:10

## 2019-08-01 RX ADMIN — SEVELAMER CARBONATE 800 MILLIGRAM(S): 2400 POWDER, FOR SUSPENSION ORAL at 17:38

## 2019-08-01 RX ADMIN — Medication 3: at 08:47

## 2019-08-01 RX ADMIN — Medication 4: at 17:38

## 2019-08-01 RX ADMIN — ATORVASTATIN CALCIUM 20 MILLIGRAM(S): 80 TABLET, FILM COATED ORAL at 21:33

## 2019-08-01 RX ADMIN — Medication 3 MILLILITER(S): at 17:39

## 2019-08-01 RX ADMIN — Medication 25 MILLIGRAM(S): at 21:33

## 2019-08-01 RX ADMIN — Medication 25 MILLIGRAM(S): at 17:38

## 2019-08-01 RX ADMIN — Medication 3 MILLILITER(S): at 12:51

## 2019-08-01 RX ADMIN — OXYCODONE AND ACETAMINOPHEN 2 TABLET(S): 5; 325 TABLET ORAL at 04:00

## 2019-08-01 RX ADMIN — SEVELAMER CARBONATE 800 MILLIGRAM(S): 2400 POWDER, FOR SUSPENSION ORAL at 12:51

## 2019-08-01 RX ADMIN — Medication 3 MILLILITER(S): at 05:02

## 2019-08-01 RX ADMIN — PANTOPRAZOLE SODIUM 40 MILLIGRAM(S): 20 TABLET, DELAYED RELEASE ORAL at 05:02

## 2019-08-01 RX ADMIN — LISINOPRIL 40 MILLIGRAM(S): 2.5 TABLET ORAL at 05:02

## 2019-08-01 RX ADMIN — OXYCODONE AND ACETAMINOPHEN 2 TABLET(S): 5; 325 TABLET ORAL at 03:16

## 2019-08-01 RX ADMIN — Medication 50 MILLIGRAM(S): at 05:02

## 2019-08-01 RX ADMIN — Medication 3 UNIT(S): at 17:38

## 2019-08-01 RX ADMIN — CLOPIDOGREL BISULFATE 75 MILLIGRAM(S): 75 TABLET, FILM COATED ORAL at 12:51

## 2019-08-01 NOTE — PROGRESS NOTE ADULT - PROBLEM SELECTOR PLAN 1
-Test BG ac and hs and 2am  -Lantus 6 units q hs  -Humalog 3 units ac meals.  -Continue Humalog low dose correction scales ac and hs and 2am low scale (1-3Units) for overnight hyperglycemia.  -Add Humalog 2 units at hs if pt eating snack. Hold if not eating.  Contact info: [phone] (24/7)

## 2019-08-01 NOTE — PROGRESS NOTE ADULT - SUBJECTIVE AND OBJECTIVE BOX
Chief Complaint: Uncontrolled DM1    S: Pt. w/ episode of hypoglycemia around 1 am last night. She had dialysis yesterday and states she ate dinner around 8pm. Pre-dinner Humalog and correction documented as given around 10pm.  This AM FS elevated at 276.    MEDICATIONS  (STANDING):  ALBUTerol/ipratropium for Nebulization 3 milliLiter(s) Nebulizer every 6 hours  aspirin enteric coated 81 milliGRAM(s) Oral daily  atorvastatin 20 milliGRAM(s) Oral at bedtime  clopidogrel Tablet 75 milliGRAM(s) Oral daily  dextrose 5%. 1000 milliLiter(s) (50 mL/Hr) IV Continuous <Continuous>  dextrose 50% Injectable 12.5 Gram(s) IV Push once  dextrose 50% Injectable 25 Gram(s) IV Push once  dextrose 50% Injectable 25 Gram(s) IV Push once  docusate sodium 100 milliGRAM(s) Oral three times a day  heparin  Injectable 5000 Unit(s) SubCutaneous every 12 hours  hydrALAZINE 25 milliGRAM(s) Oral three times a day  insulin glargine Injectable (LANTUS) 6 Unit(s) SubCutaneous at bedtime  insulin lispro (HumaLOG) corrective regimen sliding scale   SubCutaneous <User Schedule>  insulin lispro (HumaLOG) corrective regimen sliding scale   SubCutaneous three times a day before meals  insulin lispro (HumaLOG) corrective regimen sliding scale   SubCutaneous at bedtime  insulin lispro Injectable (HumaLOG) 3 Unit(s) SubCutaneous three times a day before meals  insulin lispro Injectable (HumaLOG) 2 Unit(s) SubCutaneous at bedtime  lisinopril 40 milliGRAM(s) Oral daily  metoprolol succinate ER 50 milliGRAM(s) Oral daily  metoprolol succinate ER 25 milliGRAM(s) Oral daily  multivitamin 1 Tablet(s) Oral daily  pantoprazole    Tablet 40 milliGRAM(s) Oral before breakfast  sevelamer carbonate 800 milliGRAM(s) Oral three times a day with meals    MEDICATIONS  (PRN):  acetaminophen   Tablet .. 650 milliGRAM(s) Oral every 6 hours PRN Temp greater or equal to 38.5C (101.3F), Mild Pain (1 - 3)  dextrose 40% Gel 15 Gram(s) Oral once PRN Blood Glucose LESS THAN 70 milliGRAM(s)/deciliter  glucagon  Injectable 1 milliGRAM(s) IntraMuscular once PRN Glucose LESS THAN 70 milligrams/deciliter  oxyCODONE    5 mG/acetaminophen 325 mG 2 Tablet(s) Oral every 6 hours PRN Moderate Pain (4 - 6)  senna 2 Tablet(s) Oral at bedtime PRN Constipation    Allergies  No Known Allergies      Review of Systems:  Constitutional: No fever  Eyes: No blurry vision  Neuro: No tremors  HEENT: No pain  Cardiovascular: No chest pain, palpitations  Respiratory: +SOB, no cough  GI: +nausea, +vomiting, abdominal pain  : No dysuria  Skin: no rash  Psych: no depression  Endocrine: no polyuria, polydipsia  Hem/lymph: no swelling  Osteoporosis: no fractures    ALL OTHER SYSTEMS REVIEWED AND NEGATIVE    UNABLE TO OBTAIN    PHYSICAL EXAM:  VITALS: T(C): 37 (08-01-19 @ 04:46)  T(F): 98.6 (08-01-19 @ 04:46), Max: 98.6 (08-01-19 @ 04:46)  HR: 78 (08-01-19 @ 04:46) (71 - 100)  BP: 115/54 (08-01-19 @ 04:46) (100/52 - 142/87)  RR:  (16 - 18)  SpO2:  (96% - 100%)  Wt(kg): --  GENERAL: NAD,  well-developed  EYES: No proptosis, anicteric  HEENT:  Atraumatic, Normocephalic, moist mucous membranes, poor dentition  SKIN: Dry, intact  MUSCULOSKELETAL: Full range of motion  PSYCH: Alert and oriented x 3, normal mood      POCT Blood Glucose.: 276 mg/dL (08-01-19 @ 08:42)  POCT Blood Glucose.: 206 mg/dL (08-01-19 @ 06:00)  POCT Blood Glucose.: 115 mg/dL (08-01-19 @ 02:41)  POCT Blood Glucose.: 59 mg/dL (08-01-19 @ 02:26)  POCT Blood Glucose.: 57 mg/dL (08-01-19 @ 02:05)  POCT Blood Glucose.: 58 mg/dL (08-01-19 @ 02:03)  POCT Blood Glucose.: 84 mg/dL (08-01-19 @ 01:40)  POCT Blood Glucose.: 53 mg/dL (08-01-19 @ 01:29)  POCT Blood Glucose.: 61 mg/dL (08-01-19 @ 01:10)  POCT Blood Glucose.: 77 mg/dL (08-01-19 @ 01:08)  POCT Blood Glucose.: 304 mg/dL (07-31-19 @ 21:46)  POCT Blood Glucose.: 90 mg/dL (07-31-19 @ 20:42)  POCT Blood Glucose.: 105 mg/dL (07-31-19 @ 18:17)  POCT Blood Glucose.: 469 mg/dL (07-31-19 @ 16:56)  POCT Blood Glucose.: 425 mg/dL (07-31-19 @ 13:46)  POCT Blood Glucose.: 447 mg/dL (07-31-19 @ 12:52)  POCT Blood Glucose.: 410 mg/dL (07-31-19 @ 12:51)  POCT Blood Glucose.: 360 mg/dL (07-31-19 @ 09:10)  POCT Blood Glucose.: 282 mg/dL (07-30-19 @ 22:22)  POCT Blood Glucose.: 292 mg/dL (07-30-19 @ 17:42)  POCT Blood Glucose.: 223 mg/dL (07-30-19 @ 13:07)  POCT Blood Glucose.: 408 mg/dL (07-30-19 @ 10:09)  POCT Blood Glucose.: 459 mg/dL (07-30-19 @ 09:02)  POCT Blood Glucose.: 437 mg/dL (07-30-19 @ 08:57)  POCT Blood Glucose.: 150 mg/dL (07-29-19 @ 23:55)      08-01    133<L>  |  91<L>  |  27<H>  ----------------------------<  208<H>  5.3   |  26  |  4.63<H>    EGFR if : 11<L>  EGFR if non : 9<L>    Ca    9.2      08-01  Mg     2.3     07-31  Phos  6.9     07-31    TPro  6.7  /  Alb  3.9  /  TBili  0.4  /  DBili  x   /  AST  32  /  ALT  22  /  AlkPhos  66  07-29          Thyroid Function Tests:      Hemoglobin A1C, Whole Blood: 8.2 % <H> [4.0 - 5.6] (06-16-19 @ 13:24)

## 2019-08-01 NOTE — PROGRESS NOTE ADULT - SUBJECTIVE AND OBJECTIVE BOX
[facility]   [phone]  RENAL FOLLOW UP NOTE  --------------------------------------------------------------------------------  Chief Complaint:    24 hour events/subjective:    seen earlier.   states after yesterday hd did not feel well. had HA   feels better now     PAST HISTORY  --------------------------------------------------------------------------------  No significant changes to PMH, PSH, FHx, SHx, unless otherwise noted    ALLERGIES & MEDICATIONS  --------------------------------------------------------------------------------  Allergies    No Known Allergies    Intolerances      Standing Inpatient Medications  ALBUTerol/ipratropium for Nebulization 3 milliLiter(s) Nebulizer every 6 hours  aspirin enteric coated 81 milliGRAM(s) Oral daily  atorvastatin 20 milliGRAM(s) Oral at bedtime  clopidogrel Tablet 75 milliGRAM(s) Oral daily  dextrose 5%. 1000 milliLiter(s) IV Continuous <Continuous>  dextrose 50% Injectable 12.5 Gram(s) IV Push once  dextrose 50% Injectable 25 Gram(s) IV Push once  dextrose 50% Injectable 25 Gram(s) IV Push once  docusate sodium 100 milliGRAM(s) Oral three times a day  heparin  Injectable 5000 Unit(s) SubCutaneous every 12 hours  hydrALAZINE 25 milliGRAM(s) Oral three times a day  insulin glargine Injectable (LANTUS) 6 Unit(s) SubCutaneous at bedtime  insulin lispro (HumaLOG) corrective regimen sliding scale   SubCutaneous <User Schedule>  insulin lispro (HumaLOG) corrective regimen sliding scale   SubCutaneous three times a day before meals  insulin lispro (HumaLOG) corrective regimen sliding scale   SubCutaneous at bedtime  insulin lispro Injectable (HumaLOG) 3 Unit(s) SubCutaneous three times a day before meals  insulin lispro Injectable (HumaLOG) 2 Unit(s) SubCutaneous at bedtime  lisinopril 40 milliGRAM(s) Oral daily  multivitamin 1 Tablet(s) Oral daily  pantoprazole    Tablet 40 milliGRAM(s) Oral before breakfast  sevelamer carbonate 800 milliGRAM(s) Oral three times a day with meals    PRN Inpatient Medications  acetaminophen   Tablet .. 650 milliGRAM(s) Oral every 6 hours PRN  dextrose 40% Gel 15 Gram(s) Oral once PRN  glucagon  Injectable 1 milliGRAM(s) IntraMuscular once PRN  oxyCODONE    5 mG/acetaminophen 325 mG 2 Tablet(s) Oral every 6 hours PRN  senna 2 Tablet(s) Oral at bedtime PRN      REVIEW OF SYSTEMS  --------------------------------------------------------------------------------    Gen: denies fevers/chills,  CVS: denies chest pain/palpitations  Resp: denies worsening SOB/Cough  GI: Denies N/V/Abd pain  : Denies dysuria    All other systems were reviewed and are negative, except as noted.    VITALS/PHYSICAL EXAM  --------------------------------------------------------------------------------  T(C): 36.7 (08-01-19 @ 13:02), Max: 37 (08-01-19 @ 04:46)  HR: 74 (08-01-19 @ 17:41) (71 - 78)  BP: 150/80 (08-01-19 @ 17:41) (100/52 - 150/80)  RR: 18 (08-01-19 @ 13:02) (18 - 18)  SpO2: 100% (08-01-19 @ 13:02) (96% - 100%)  Wt(kg): --        07-31-19 @ 07:01  -  08-01-19 @ 07:00  --------------------------------------------------------  IN: 360 mL / OUT: 0 mL / NET: 360 mL    08-01-19 @ 07:01  -  08-01-19 @ 19:18  --------------------------------------------------------  IN: 480 mL / OUT: 0 mL / NET: 480 mL      Physical Exam:  	  Gen: chronically ill appearing F    	no jvd , supple neck,   	Pulm: decrease bs  no rales or ronchi or wheezing  	CV: RRR, S1S2; no rub  	Abd: +BS, soft, nontender/nondistended  	: No suprapubic tenderness  	UE: Warm, no cyanosis  no clubbing,  no edema; no asterixis + RUE thrombophlebitis   	LE: Warm, no cyanosis  no clubbing, 3+  edema  	Neuro: alert and oriented. speech coherent   	+ avf + bruit/thrill   	      LABS/STUDIES  --------------------------------------------------------------------------------              9.9    5.60  >-----------<  194      [07-31-19 @ 08:03]              31.9     133  |  91  |  27  ----------------------------<  208      [08-01-19 @ 06:00]  5.3   |  26  |  4.63        Ca     9.2     [08-01-19 @ 06:00]      Mg     2.3     [07-31-19 @ 06:26]      Phos  6.9     [07-31-19 @ 06:26]            Creatinine Trend:  SCr 4.63 [08-01 @ 06:00]  SCr 3.96 [07-31 @ 19:10]  SCr 5.27 [07-31 @ 06:26]  SCr 3.05 [07-30 @ 01:25]  SCr 6.22 [07-29 @ 16:55]                  Iron 42, TIBC 253, %sat 17      [06-17-19 @ 17:54]  Ferritin 1838      [06-17-19 @ 18:06]  PTH -- (Ca 9.6)      [06-17-19 @ 18:06]   177  PTH -- (Ca 7.8)      [03-29-19 @ 20:38]   73  PTH -- (Ca 7.3)      [02-22-19 @ 02:49]   59  HbA1c 8.2      [06-16-19 @ 13:24]  TSH 0.71      [11-17-18 @ 08:25]

## 2019-08-01 NOTE — PROGRESS NOTE ADULT - SUBJECTIVE AND OBJECTIVE BOX
Patient is a 64y old  Female who presents with a chief complaint of sob (01 Aug 2019 10:55)      SUBJECTIVE / OVERNIGHT EVENTS: No new complaints.   Review of Systems  chest pain no  palpitations no  sob improving   nausea no  headache no    MEDICATIONS  (STANDING):  ALBUTerol/ipratropium for Nebulization 3 milliLiter(s) Nebulizer every 6 hours  aspirin enteric coated 81 milliGRAM(s) Oral daily  atorvastatin 20 milliGRAM(s) Oral at bedtime  clopidogrel Tablet 75 milliGRAM(s) Oral daily  dextrose 5%. 1000 milliLiter(s) (50 mL/Hr) IV Continuous <Continuous>  dextrose 50% Injectable 12.5 Gram(s) IV Push once  dextrose 50% Injectable 25 Gram(s) IV Push once  dextrose 50% Injectable 25 Gram(s) IV Push once  docusate sodium 100 milliGRAM(s) Oral three times a day  heparin  Injectable 5000 Unit(s) SubCutaneous every 12 hours  hydrALAZINE 25 milliGRAM(s) Oral three times a day  insulin glargine Injectable (LANTUS) 6 Unit(s) SubCutaneous at bedtime  insulin lispro (HumaLOG) corrective regimen sliding scale   SubCutaneous <User Schedule>  insulin lispro (HumaLOG) corrective regimen sliding scale   SubCutaneous three times a day before meals  insulin lispro (HumaLOG) corrective regimen sliding scale   SubCutaneous at bedtime  insulin lispro Injectable (HumaLOG) 3 Unit(s) SubCutaneous three times a day before meals  insulin lispro Injectable (HumaLOG) 2 Unit(s) SubCutaneous at bedtime  lisinopril 40 milliGRAM(s) Oral daily  metoprolol succinate ER 25 milliGRAM(s) Oral once  multivitamin 1 Tablet(s) Oral daily  pantoprazole    Tablet 40 milliGRAM(s) Oral before breakfast  sevelamer carbonate 800 milliGRAM(s) Oral three times a day with meals    MEDICATIONS  (PRN):  acetaminophen   Tablet .. 650 milliGRAM(s) Oral every 6 hours PRN Temp greater or equal to 38.5C (101.3F), Mild Pain (1 - 3)  dextrose 40% Gel 15 Gram(s) Oral once PRN Blood Glucose LESS THAN 70 milliGRAM(s)/deciliter  glucagon  Injectable 1 milliGRAM(s) IntraMuscular once PRN Glucose LESS THAN 70 milligrams/deciliter  oxyCODONE    5 mG/acetaminophen 325 mG 2 Tablet(s) Oral every 6 hours PRN Moderate Pain (4 - 6)  senna 2 Tablet(s) Oral at bedtime PRN Constipation      Vital Signs Last 24 Hrs  T(C): 37 (01 Aug 2019 04:46), Max: 37 (01 Aug 2019 04:46)  T(F): 98.6 (01 Aug 2019 04:46), Max: 98.6 (01 Aug 2019 04:46)  HR: 78 (01 Aug 2019 04:46) (71 - 100)  BP: 115/54 (01 Aug 2019 04:46) (100/52 - 142/87)  BP(mean): --  RR: 18 (01 Aug 2019 04:46) (16 - 18)  SpO2: 99% (01 Aug 2019 04:46) (96% - 100%)    PHYSICAL EXAM:  GENERAL: NAD  HEAD:  Atraumatic, Normocephalic  EYES: EOMI, PERRLA, conjunctiva and sclera clear  NECK: Supple, No JVD  CHEST/LUNG: Clear to auscultation bilaterally; No wheeze  HEART: Regular rate and rhythm; No murmurs, rubs, or gallops  ABDOMEN: Soft, Nontender, Nondistended; Bowel sounds present  EXTREMITIES:  2+bl edema  PSYCH: Anxious  NEUROLOGY: non-focal  SKIN: No rashes or lesions    LABS:                        9.9    5.60  )-----------( 194      ( 31 Jul 2019 08:03 )             31.9     08-01    133<L>  |  91<L>  |  27<H>  ----------------------------<  208<H>  5.3   |  26  |  4.63<H>    Ca    9.2      01 Aug 2019 06:00  Phos  6.9     07-31  Mg     2.3     07-31                  RADIOLOGY & ADDITIONAL TESTS:    Imaging Personally Reviewed:    Consultant(s) Notes Reviewed:      Care Discussed with Consultants/Other Providers:

## 2019-08-01 NOTE — PROGRESS NOTE ADULT - ASSESSMENT
64 y/o M w/h/o uncontrolled TIDM (HbA1c 8.2% 6/19) c/b neuropathy and retinopathy as well as CAD s/p multiple stents, CHF (EF 50% 10/2018), TIA, PVD s/p b/l fem-pop bypass, ESRD> HD M/T/Th/Sat, presenting with worsening SOB and LE edema. Found to have CHF exacerbation/fluid retention/hyperkalemia and thrombophlebitis. Had HD yesterday. Tolerating POs with frequent nutritional indiscretions and glycemic control above goal. Had hypoglycemia early this AM that may have been due to getting her pre-meal dinner insulin late, now resolved. Will therefore not make additional changes at this time.

## 2019-08-01 NOTE — PROGRESS NOTE ADULT - ASSESSMENT
64 f with    ESRD/ fluid overload  - HD  - Nephrology follow Dr. Schmidt    DM type 1  - ADA diet  - BS control  - endocrine follow    CHF cr systolic  - continue Rx  - cardiology follow    CAD/ s/p NSTEMI  - continue Rx  - cardiology follow    COPD  - nebs    PVD  - conservative Rx  Anxiety/ Depression control  Pierce Viera MD pager [phone]

## 2019-08-01 NOTE — PROGRESS NOTE ADULT - SUBJECTIVE AND OBJECTIVE BOX
Cardiovascular Disease Progress Note    Overnight events: No acute events overnight.  no further episodes of chest pain/sob. no palps/dizziness. edema improving  Otherwise review of systems negative    Objective Findings:  T(C): 37 (19 @ 04:46), Max: 37 (19 @ 04:46)  HR: 78 (19 @ 04:46) (71 - 100)  BP: 115/54 (19 @ 04:46) (100/52 - 142/87)  RR: 18 (19 @ 04:46) (16 - 18)  SpO2: 99% (19 @ 04:46) (96% - 100%)  Wt(kg): --  Daily     Daily Weight in k.9 (01 Aug 2019 07:37)      Physical Exam:  Gen: NAD  HEENT: EOMI  CV: RRR, normal S1 + S2, no m/r/g  Lungs: CTAB  Abd: soft, non-tender  Ext: mild edema    Telemetry: nsr, st    Laboratory Data:                        9.9    5.60  )-----------( 194      ( 2019 08:03 )             31.9     08-01    133<L>  |  91<L>  |  27<H>  ----------------------------<  208<H>  5.3   |  26  |  4.63<H>    Ca    9.2      01 Aug 2019 06:00  Phos  6.9     07-31  Mg     2.3     07-31                Inpatient Medications:  MEDICATIONS  (STANDING):  ALBUTerol/ipratropium for Nebulization 3 milliLiter(s) Nebulizer every 6 hours  aspirin enteric coated 81 milliGRAM(s) Oral daily  atorvastatin 20 milliGRAM(s) Oral at bedtime  clopidogrel Tablet 75 milliGRAM(s) Oral daily  dextrose 5%. 1000 milliLiter(s) (50 mL/Hr) IV Continuous <Continuous>  dextrose 50% Injectable 12.5 Gram(s) IV Push once  dextrose 50% Injectable 25 Gram(s) IV Push once  dextrose 50% Injectable 25 Gram(s) IV Push once  docusate sodium 100 milliGRAM(s) Oral three times a day  heparin  Injectable 5000 Unit(s) SubCutaneous every 12 hours  hydrALAZINE 25 milliGRAM(s) Oral three times a day  insulin glargine Injectable (LANTUS) 6 Unit(s) SubCutaneous at bedtime  insulin lispro (HumaLOG) corrective regimen sliding scale   SubCutaneous <User Schedule>  insulin lispro (HumaLOG) corrective regimen sliding scale   SubCutaneous three times a day before meals  insulin lispro (HumaLOG) corrective regimen sliding scale   SubCutaneous at bedtime  insulin lispro Injectable (HumaLOG) 3 Unit(s) SubCutaneous three times a day before meals  insulin lispro Injectable (HumaLOG) 2 Unit(s) SubCutaneous at bedtime  lisinopril 40 milliGRAM(s) Oral daily  metoprolol succinate ER 50 milliGRAM(s) Oral daily  metoprolol succinate ER 25 milliGRAM(s) Oral daily  multivitamin 1 Tablet(s) Oral daily  pantoprazole    Tablet 40 milliGRAM(s) Oral before breakfast  sevelamer carbonate 800 milliGRAM(s) Oral three times a day with meals      Assessment:  -chest pain with mild ekg changes and stable hs trop  -CHF exacerbation  -NSVT  -pAT  -volume overload  -ischemic cardiomyopathy, cad s/p multiple stents  -esrd on hd  -PAD s/p prior intervention       Recs:  -chest pain with known extensive CAD and ICM. s/p [facility] 2019 --> severe and diffuse instent restenosis along RCA --> unable to stent due to multiple layers of stenting and prior brachytherapy. would consider complex intervention if true ischemic sx develop that dont respond to medical therapy. cont with aggressive medical and anti-anginal therapy for now with anti-platelet, statins and metop, hydral and lisinopril. dr batres recommendations appreciated  -c/w beta blockers for nsvt/pat/cad (as above). would inc toprol to 50mg bid  -hx of prolonged qtc. avoid qtc prolonging meds  -s/p TTE 2019: mod-severe MR, pseudo AS (low flow-low gradient), mod-severe LV dysfunction --> recent CTS consult with dr hebert appreciated. plan is for medical management given surgical risk and patient preference  -c/w asa, plavix, statin for ischemic cardiomyopathy/CAD/PAD  -dvt ppx        Over 25 minutes spent on total encounter; more than 50% of the visit was spent counseling and/or coordinating care by the attending physician.      Julián Biswas MD   Cardiovascular Disease  [phone]

## 2019-08-01 NOTE — PROGRESS NOTE ADULT - ASSESSMENT
64 Female hx CAD (Cath Feb '19 showing 99% RCA, 100% RPLS, 100% Cx) s/p multiple stents/brachytherapy, ESRD (on HD M/T/T/Sa), DM1 c/b neuropathy and retinopathy, CHF, mod MR, severe AS, RHF, TIA, severe PVD s/p b/l fempop bypass, left subclavian vein stenosis s/p stent, COPD not on O2 pw cc of LE swelling and ct scan suggestive of CHF. also with hyperkalemia and thrombophlebitis     1- esrd  2- htn  3- pulm edema  4- shpt      hd am hold hd today   cont hydralazine and lisinopril   dc renvela given pth is too low   fluid status is slowly improving

## 2019-08-02 LAB
ANION GAP SERPL CALC-SCNC: 23 MMOL/L — HIGH (ref 5–17)
BUN SERPL-MCNC: 38 MG/DL — HIGH (ref 7–23)
CALCIUM SERPL-MCNC: 9.5 MG/DL — SIGNIFICANT CHANGE UP (ref 8.4–10.5)
CHLORIDE SERPL-SCNC: 89 MMOL/L — LOW (ref 96–108)
CO2 SERPL-SCNC: 24 MMOL/L — SIGNIFICANT CHANGE UP (ref 22–31)
CREAT SERPL-MCNC: 5.95 MG/DL — HIGH (ref 0.5–1.3)
GLUCOSE BLDC GLUCOMTR-MCNC: 160 MG/DL — HIGH (ref 70–99)
GLUCOSE BLDC GLUCOMTR-MCNC: 174 MG/DL — HIGH (ref 70–99)
GLUCOSE BLDC GLUCOMTR-MCNC: 216 MG/DL — HIGH (ref 70–99)
GLUCOSE BLDC GLUCOMTR-MCNC: 220 MG/DL — HIGH (ref 70–99)
GLUCOSE BLDC GLUCOMTR-MCNC: 230 MG/DL — HIGH (ref 70–99)
GLUCOSE BLDC GLUCOMTR-MCNC: 297 MG/DL — HIGH (ref 70–99)
GLUCOSE SERPL-MCNC: 185 MG/DL — HIGH (ref 70–99)
HCT VFR BLD CALC: 33.6 % — LOW (ref 34.5–45)
HGB BLD-MCNC: 10.4 G/DL — LOW (ref 11.5–15.5)
MCHC RBC-ENTMCNC: 31 GM/DL — LOW (ref 32–36)
MCHC RBC-ENTMCNC: 33.8 PG — SIGNIFICANT CHANGE UP (ref 27–34)
MCV RBC AUTO: 109.1 FL — HIGH (ref 80–100)
PLATELET # BLD AUTO: 210 K/UL — SIGNIFICANT CHANGE UP (ref 150–400)
POTASSIUM SERPL-MCNC: 5.7 MMOL/L — HIGH (ref 3.5–5.3)
POTASSIUM SERPL-SCNC: 5.7 MMOL/L — HIGH (ref 3.5–5.3)
RBC # BLD: 3.08 M/UL — LOW (ref 3.8–5.2)
RBC # FLD: 13.5 % — SIGNIFICANT CHANGE UP (ref 10.3–14.5)
SODIUM SERPL-SCNC: 136 MMOL/L — SIGNIFICANT CHANGE UP (ref 135–145)
WBC # BLD: 9.23 K/UL — SIGNIFICANT CHANGE UP (ref 3.8–10.5)
WBC # FLD AUTO: 9.23 K/UL — SIGNIFICANT CHANGE UP (ref 3.8–10.5)

## 2019-08-02 PROCEDURE — 99232 SBSQ HOSP IP/OBS MODERATE 35: CPT

## 2019-08-02 RX ORDER — INSULIN LISPRO 100/ML
VIAL (ML) SUBCUTANEOUS
Refills: 0 | Status: DISCONTINUED | OUTPATIENT
Start: 2019-08-02 | End: 2019-08-08

## 2019-08-02 RX ORDER — INSULIN GLARGINE 100 [IU]/ML
5 INJECTION, SOLUTION SUBCUTANEOUS AT BEDTIME
Refills: 0 | Status: DISCONTINUED | OUTPATIENT
Start: 2019-08-02 | End: 2019-08-03

## 2019-08-02 RX ADMIN — PANTOPRAZOLE SODIUM 40 MILLIGRAM(S): 20 TABLET, DELAYED RELEASE ORAL at 05:09

## 2019-08-02 RX ADMIN — Medication 2: at 18:34

## 2019-08-02 RX ADMIN — Medication 3 MILLILITER(S): at 05:09

## 2019-08-02 RX ADMIN — Medication 2: at 07:48

## 2019-08-02 RX ADMIN — Medication 81 MILLIGRAM(S): at 12:22

## 2019-08-02 RX ADMIN — Medication 25 MILLIGRAM(S): at 12:22

## 2019-08-02 RX ADMIN — Medication 1: at 12:21

## 2019-08-02 RX ADMIN — Medication 3 UNIT(S): at 07:48

## 2019-08-02 RX ADMIN — Medication 3 MILLILITER(S): at 12:22

## 2019-08-02 RX ADMIN — Medication 3 UNIT(S): at 12:21

## 2019-08-02 RX ADMIN — INSULIN GLARGINE 5 UNIT(S): 100 INJECTION, SOLUTION SUBCUTANEOUS at 22:20

## 2019-08-02 RX ADMIN — CLOPIDOGREL BISULFATE 75 MILLIGRAM(S): 75 TABLET, FILM COATED ORAL at 12:22

## 2019-08-02 RX ADMIN — Medication 3 UNIT(S): at 18:34

## 2019-08-02 RX ADMIN — INSULIN GLARGINE 6 UNIT(S): 100 INJECTION, SOLUTION SUBCUTANEOUS at 00:12

## 2019-08-02 RX ADMIN — Medication 25 MILLIGRAM(S): at 05:09

## 2019-08-02 RX ADMIN — Medication 1 TABLET(S): at 12:22

## 2019-08-02 RX ADMIN — OXYCODONE AND ACETAMINOPHEN 2 TABLET(S): 5; 325 TABLET ORAL at 00:00

## 2019-08-02 RX ADMIN — LISINOPRIL 40 MILLIGRAM(S): 2.5 TABLET ORAL at 05:09

## 2019-08-02 RX ADMIN — ATORVASTATIN CALCIUM 20 MILLIGRAM(S): 80 TABLET, FILM COATED ORAL at 22:19

## 2019-08-02 RX ADMIN — Medication 50 MILLIGRAM(S): at 05:09

## 2019-08-02 RX ADMIN — Medication 3 MILLILITER(S): at 22:19

## 2019-08-02 NOTE — PROGRESS NOTE ADULT - SUBJECTIVE AND OBJECTIVE BOX
Cardiovascular Disease Progress Note    Overnight events: No acute events overnight.  paroxysmal atach. intermittent episodes of nausea and chest discomfort, none currently. no new cardiac sx reported  Otherwise review of systems negative    Objective Findings:  T(C): 36.8 (19 @ 04:35), Max: 36.8 (19 @ 04:35)  HR: 98 (19 @ 04:35) (71 - 105)  BP: 124/69 (19 @ 04:35) (123/83 - 150/80)  RR: 18 (19 @ 04:35) (18 - 18)  SpO2: 99% (19 @ 04:35) (98% - 100%)  Wt(kg): --  Daily     Daily Weight in k.5 (02 Aug 2019 07:31)      Physical Exam:  Gen: NAD  HEENT: EOMI  CV: RRR, normal S1 + S2, no m/r/g  Lungs: CTAB  Abd: soft, non-tender  Ext: No edema    Telemetry: as above    Laboratory Data:                        9.9    5.60  )-----------( 194      ( 2019 08:03 )             31.9     08-02    136  |  89<L>  |  38<H>  ----------------------------<  185<H>  5.7<H>   |  24  |  5.95<H>    Ca    9.5      02 Aug 2019 06:17                Inpatient Medications:  MEDICATIONS  (STANDING):  ALBUTerol/ipratropium for Nebulization 3 milliLiter(s) Nebulizer every 6 hours  aspirin enteric coated 81 milliGRAM(s) Oral daily  atorvastatin 20 milliGRAM(s) Oral at bedtime  clopidogrel Tablet 75 milliGRAM(s) Oral daily  dextrose 5%. 1000 milliLiter(s) (50 mL/Hr) IV Continuous <Continuous>  dextrose 50% Injectable 12.5 Gram(s) IV Push once  dextrose 50% Injectable 25 Gram(s) IV Push once  dextrose 50% Injectable 25 Gram(s) IV Push once  docusate sodium 100 milliGRAM(s) Oral three times a day  heparin  Injectable 5000 Unit(s) SubCutaneous every 12 hours  hydrALAZINE 25 milliGRAM(s) Oral three times a day  insulin glargine Injectable (LANTUS) 6 Unit(s) SubCutaneous at bedtime  insulin lispro (HumaLOG) corrective regimen sliding scale   SubCutaneous <User Schedule>  insulin lispro (HumaLOG) corrective regimen sliding scale   SubCutaneous three times a day before meals  insulin lispro (HumaLOG) corrective regimen sliding scale   SubCutaneous at bedtime  insulin lispro Injectable (HumaLOG) 3 Unit(s) SubCutaneous three times a day before meals  insulin lispro Injectable (HumaLOG) 2 Unit(s) SubCutaneous at bedtime  lisinopril 40 milliGRAM(s) Oral daily  metoprolol succinate ER 50 milliGRAM(s) Oral every 12 hours  multivitamin 1 Tablet(s) Oral daily  pantoprazole    Tablet 40 milliGRAM(s) Oral before breakfast      Assessment:  -chest pain with mild ekg changes and stable hs trop  -CHF exacerbation  -NSVT  -pAT  -volume overload  -ischemic cardiomyopathy, cad s/p multiple stents  -esrd on hd  -PAD s/p prior intervention       Recs:  -chest pain with known extensive CAD and ICM. s/p [facility] 2019 --> severe and diffuse instent restenosis along RCA --> unable to stent due to multiple layers of stenting and prior brachytherapy. would consider complex intervention if true ischemic sx develop that dont respond to medical therapy. cont with aggressive medical and anti-anginal therapy for now with anti-platelet, statins and metop, hydral and lisinopril. dr batres recommendations appreciated. would start isosorbide dinitrate 10mg tid   -c/w beta blockers for nsvt/pat/cad (as above).c/w  toprol 50mg bid  -hx of prolonged qtc. avoid qtc prolonging meds  -s/p TTE 2019: mod-severe MR, pseudo AS (low flow-low gradient), mod-severe LV dysfunction --> recent CTS consult with dr hebert appreciated. plan is for medical management given surgical risk and patient preference  -c/w asa, plavix, statin for ischemic cardiomyopathy/CAD/PAD  -dvt ppx        Over 25 minutes spent on total encounter; more than 50% of the visit was spent counseling and/or coordinating care by the attending physician.      Julián Biswas MD   Cardiovascular Disease  [phone]

## 2019-08-02 NOTE — PROGRESS NOTE ADULT - SUBJECTIVE AND OBJECTIVE BOX
[facility]   [phone]  RENAL FOLLOW UP NOTE  --------------------------------------------------------------------------------  Chief Complaint:    24 hour events/subjective:    States has abdominal pain overnight. No diarrhea.  No constipation.  Decrease by mouth intake.    PAST HISTORY  --------------------------------------------------------------------------------  No significant changes to PMH, PSH, FHx, SHx, unless otherwise noted    ALLERGIES & MEDICATIONS  --------------------------------------------------------------------------------  Allergies    No Known Allergies    Intolerances      Standing Inpatient Medications  ALBUTerol/ipratropium for Nebulization 3 milliLiter(s) Nebulizer every 6 hours  aspirin enteric coated 81 milliGRAM(s) Oral daily  atorvastatin 20 milliGRAM(s) Oral at bedtime  clopidogrel Tablet 75 milliGRAM(s) Oral daily  dextrose 5%. 1000 milliLiter(s) IV Continuous <Continuous>  dextrose 50% Injectable 12.5 Gram(s) IV Push once  dextrose 50% Injectable 25 Gram(s) IV Push once  dextrose 50% Injectable 25 Gram(s) IV Push once  docusate sodium 100 milliGRAM(s) Oral three times a day  heparin  Injectable 5000 Unit(s) SubCutaneous every 12 hours  hydrALAZINE 25 milliGRAM(s) Oral three times a day  insulin glargine Injectable (LANTUS) 6 Unit(s) SubCutaneous at bedtime  insulin lispro (HumaLOG) corrective regimen sliding scale   SubCutaneous <User Schedule>  insulin lispro (HumaLOG) corrective regimen sliding scale   SubCutaneous three times a day before meals  insulin lispro (HumaLOG) corrective regimen sliding scale   SubCutaneous at bedtime  insulin lispro Injectable (HumaLOG) 3 Unit(s) SubCutaneous three times a day before meals  insulin lispro Injectable (HumaLOG) 2 Unit(s) SubCutaneous at bedtime  metoprolol succinate ER 50 milliGRAM(s) Oral every 12 hours  multivitamin 1 Tablet(s) Oral daily  pantoprazole    Tablet 40 milliGRAM(s) Oral before breakfast    PRN Inpatient Medications  acetaminophen   Tablet .. 650 milliGRAM(s) Oral every 6 hours PRN  dextrose 40% Gel 15 Gram(s) Oral once PRN  glucagon  Injectable 1 milliGRAM(s) IntraMuscular once PRN  oxyCODONE    5 mG/acetaminophen 325 mG 2 Tablet(s) Oral every 6 hours PRN  senna 2 Tablet(s) Oral at bedtime PRN      REVIEW OF SYSTEMS  --------------------------------------------------------------------------------    Gen: denies  fevers/chills,  CVS: denies chest pain/palpitations  Resp: denies SOB/Cough  GI: Denies N/V/Abd pain+  : Denies dysuria    All other systems were reviewed and are negative, except as noted.    VITALS/PHYSICAL EXAM  --------------------------------------------------------------------------------  T(C): 36.7 (08-02-19 @ 13:33), Max: 36.8 (08-02-19 @ 04:35)  HR: 97 (08-02-19 @ 13:33) (74 - 105)  BP: 138/74 (08-02-19 @ 13:33) (123/83 - 150/80)  RR: 18 (08-02-19 @ 13:33) (18 - 18)  SpO2: 99% (08-02-19 @ 13:33) (98% - 100%)  Wt(kg): --    Weight (kg): 58.9 (08-01-19 @ 07:37)      08-01-19 @ 07:01  -  08-02-19 @ 07:00  --------------------------------------------------------  IN: 720 mL / OUT: 0 mL / NET: 720 mL    08-02-19 @ 07:01  -  08-02-19 @ 13:38  --------------------------------------------------------  IN: 360 mL / OUT: 0 mL / NET: 360 mL      Physical Exam:  	      n: chronically ill appearing F    	no jvd , supple neck,   	Pulm: decrease bs  no rales or ronchi or wheezing  	CV: RRR, S1S2; no rub  	Abd: +BS, soft, nontender/nondistended  	: No suprapubic tenderness  	UE: Warm, no cyanosis  no clubbing,  no edema; no asterixis + RUE thrombophlebitis   	LE: Warm, no cyanosis  no clubbing, 3+  edema  	Neuro: alert and oriented. speech coherent   	+ avf + bruit/thrill   		  LABS/STUDIES  --------------------------------------------------------------------------------              10.4   9.23  >-----------<  210      [08-02-19 @ 08:50]              33.6     136  |  89  |  38  ----------------------------<  185      [08-02-19 @ 06:17]  5.7   |  24  |  5.95        Ca     9.5     [08-02-19 @ 06:17]            Creatinine Trend:  SCr 5.95 [08-02 @ 06:17]  SCr 4.63 [08-01 @ 06:00]  SCr 3.96 [07-31 @ 19:10]  SCr 5.27 [07-31 @ 06:26]  SCr 3.05 [07-30 @ 01:25]                  Iron 42, TIBC 253, %sat 17      [06-17-19 @ 17:54]  Ferritin 1838      [06-17-19 @ 18:06]  PTH -- (Ca 9.6)      [06-17-19 @ 18:06]   177  PTH -- (Ca 7.8)      [03-29-19 @ 20:38]   73  PTH -- (Ca 7.3)      [02-22-19 @ 02:49]   59  HbA1c 8.2      [06-16-19 @ 13:24]  TSH 0.71      [11-17-18 @ 08:25]

## 2019-08-02 NOTE — PROGRESS NOTE ADULT - PROBLEM SELECTOR PLAN 1
-Test BG ac and hs and 2am  -Change Lantus dose to 5 units q hs  -Humalog 3 units ac meals.  -Adjusted Humalog low dose correction scales ac meal to 1-3 units to prevent insulin stacking.  and  -C/w hs and add 2am low scale (1-3Units) for overnight hyperglycemia.  -Add Humalog 2 units at hs if pt eating snack. Hold if not eating.  -Plan discussed with pt/team NP.  Contact info: [phone] (24/7). pager [phone]

## 2019-08-02 NOTE — PROGRESS NOTE ADULT - ASSESSMENT
64 Female hx CAD (Cath Feb '19 showing 99% RCA, 100% RPLS, 100% Cx) s/p multiple stents/brachytherapy, ESRD (on HD M/T/T/Sa), DM1 c/b neuropathy and retinopathy, CHF, mod MR, severe AS, RHF, TIA, severe PVD s/p b/l fempop bypass, left subclavian vein stenosis s/p stent, COPD not on O2 pw cc of LE swelling and ct scan suggestive of CHF. also with hyperkalemia and thrombophlebitis     1- esrd  2- hyperkalemia improved   3- pulm edema  4- htn      Still fluid overloaded.  HD F 1 60 3.5 hours.  2K bath Blood flow rate 400 Dialysi  Flow rate 600  Next hemodialysis in a.m.  Hold lisinopril to allow increase in blood pressure to be able to remove more fluid.

## 2019-08-02 NOTE — PROGRESS NOTE ADULT - SUBJECTIVE AND OBJECTIVE BOX
DIABETES FOLLOW UP NOTE: Saw pt earlier today  INTERVAL HX: 64 y/o M w/h/o uncontrolled TIDM (HbA1c 8.2% 6/19) c/b neuropathy and retinopathy as well as CAD s/p multiple stents, CHF (EF 50% 10/2018), TIA, PVD s/p b/l fem-pop bypass, ESRD> HD M/T/Th/Sat, presenting with worsening SOB and LE edema. Reports that she has not received HD 4x/week but 3x/week only for the last 2 weeks. Reports tolerating POs and feeling hungry all the time. Asking for cheddar cheese/ broccoli soup from cafeteria at time of visit. Glycemic control remains uncontrolled with BG 60s to 300s in last 24 hours. Noted BG lower at bedtime after getting humalog correction plus meal time doses during the day for high BG numbers. C/o feeling pain in LEs and persistent edema.      Review of Systems:  General: As above  Cardiovascular: No chest pain, palpitations  Respiratory: No SOB, no cough  GI: No nausea, vomiting, abdominal pain  Endocrine: no polyuria, polydipsia or S&Sx of hypoglycemia    Allergies    No Known Allergies    Intolerances      MEDICATIONS:  atorvastatin 20 milliGRAM(s) Oral at bedtime  insulin glargine Injectable (LANTUS) 6 Unit(s) SubCutaneous at bedtime  insulin lispro (HumaLOG) corrective regimen sliding scale   SubCutaneous three times a day before meals  insulin lispro (HumaLOG) corrective regimen sliding scale   SubCutaneous at bedtime  insulin lispro Injectable (HumaLOG) 3 Unit(s) SubCutaneous three times a day before meals  insulin lispro Injectable (HumaLOG) 2 Unit(s) SubCutaneous at bedtime        PHYSICAL EXAM:  Vital Signs Last 24 Hrs  T(C): 36.7 (08-02-19 @ 13:33), Max: 36.8 (08-02-19 @ 04:35)  T(F): 98 (08-02-19 @ 13:33), Max: 98.3 (08-02-19 @ 04:35)  HR: 97 (08-02-19 @ 13:33) (74 - 105)  BP: 138/74 (08-02-19 @ 13:33) (123/83 - 150/80)  BP(mean): --  RR: 18 (08-02-19 @ 13:33) (18 - 18)  SpO2: 99% (08-02-19 @ 13:33) (98% - 100%)  Weight (kg): 58.9 (08-01-19 @ 07:37)  GENERAL: Female sitting at edge of bed in NAD  Abdomen: Soft, nontender, non distended  Extremities: Warm, 1+ edema in LEs. Very sensitive to touch  NEURO: A&O X3    LABS:  POCT Blood Glucose.: 230 mg/dL (08-02-19 @ 11:55)  POCT Blood Glucose.: 216 mg/dL (08-02-19 @ 07:35)  POCT Blood Glucose.: 174 mg/dL (08-02-19 @ 03:18)  POCT Blood Glucose.: 220 mg/dL (08-02-19 @ 00:07)  POCT Blood Glucose.: 61 mg/dL (08-01-19 @ 22:48)  POCT Blood Glucose.: 76 mg/dL (08-01-19 @ 22:12)  POCT Blood Glucose.: 79 mg/dL (08-01-19 @ 21:23)  POCT Blood Glucose.: 322 mg/dL (08-01-19 @ 17:16)  POCT Blood Glucose.: 366 mg/dL (08-01-19 @ 12:36)  POCT Blood Glucose.: 276 mg/dL (08-01-19 @ 08:42)  POCT Blood Glucose.: 206 mg/dL (08-01-19 @ 06:00)  POCT Blood Glucose.: 115 mg/dL (08-01-19 @ 02:41)  POCT Blood Glucose.: 59 mg/dL (08-01-19 @ 02:26)  POCT Blood Glucose.: 57 mg/dL (08-01-19 @ 02:05)  POCT Blood Glucose.: 58 mg/dL (08-01-19 @ 02:03)  POCT Blood Glucose.: 84 mg/dL (08-01-19 @ 01:40)  POCT Blood Glucose.: 53 mg/dL (08-01-19 @ 01:29)  POCT Blood Glucose.: 61 mg/dL (08-01-19 @ 01:10)  POCT Blood Glucose.: 77 mg/dL (08-01-19 @ 01:08)  POCT Blood Glucose.: 304 mg/dL (07-31-19 @ 21:46)  POCT Blood Glucose.: 90 mg/dL (07-31-19 @ 20:42)  POCT Blood Glucose.: 105 mg/dL (07-31-19 @ 18:17)  POCT Blood Glucose.: 469 mg/dL (07-31-19 @ 16:56)                          10.4   9.23  )-----------( 210      ( 02 Aug 2019 08:50 )             33.6     08-02    136  |  89<L>  |  38<H>  ----------------------------<  185<H>  5.7<H>   |  24  |  5.95<H>    Ca    9.5      02 Aug 2019 06:17    EGFR if : 9<L>  EGFR if non : 8<L>    Hemoglobin A1C, Whole Blood: 8.2 % <H> [4.0 - 5.6] (06-16-19 @ 13:24)

## 2019-08-02 NOTE — PROGRESS NOTE ADULT - SUBJECTIVE AND OBJECTIVE BOX
Patient is a 64y old  Female who presents with a chief complaint of sob (02 Aug 2019 15:29)      SUBJECTIVE / OVERNIGHT EVENTS: weak, tired.  at bedside.  Review of Systems  chest pain no  palpitations no  sob no  nausea no  headache no    MEDICATIONS  (STANDING):  ALBUTerol/ipratropium for Nebulization 3 milliLiter(s) Nebulizer every 6 hours  aspirin enteric coated 81 milliGRAM(s) Oral daily  atorvastatin 20 milliGRAM(s) Oral at bedtime  clopidogrel Tablet 75 milliGRAM(s) Oral daily  dextrose 5%. 1000 milliLiter(s) (50 mL/Hr) IV Continuous <Continuous>  dextrose 50% Injectable 12.5 Gram(s) IV Push once  dextrose 50% Injectable 25 Gram(s) IV Push once  dextrose 50% Injectable 25 Gram(s) IV Push once  docusate sodium 100 milliGRAM(s) Oral three times a day  heparin  Injectable 5000 Unit(s) SubCutaneous every 12 hours  hydrALAZINE 25 milliGRAM(s) Oral three times a day  insulin glargine Injectable (LANTUS) 5 Unit(s) SubCutaneous at bedtime  insulin lispro (HumaLOG) corrective regimen sliding scale   SubCutaneous three times a day before meals  insulin lispro (HumaLOG) corrective regimen sliding scale   SubCutaneous <User Schedule>  insulin lispro (HumaLOG) corrective regimen sliding scale   SubCutaneous at bedtime  insulin lispro Injectable (HumaLOG) 3 Unit(s) SubCutaneous three times a day before meals  insulin lispro Injectable (HumaLOG) 2 Unit(s) SubCutaneous at bedtime  metoprolol succinate ER 50 milliGRAM(s) Oral every 12 hours  multivitamin 1 Tablet(s) Oral daily  pantoprazole    Tablet 40 milliGRAM(s) Oral before breakfast    MEDICATIONS  (PRN):  acetaminophen   Tablet .. 650 milliGRAM(s) Oral every 6 hours PRN Temp greater or equal to 38.5C (101.3F), Mild Pain (1 - 3)  dextrose 40% Gel 15 Gram(s) Oral once PRN Blood Glucose LESS THAN 70 milliGRAM(s)/deciliter  glucagon  Injectable 1 milliGRAM(s) IntraMuscular once PRN Glucose LESS THAN 70 milligrams/deciliter  oxyCODONE    5 mG/acetaminophen 325 mG 2 Tablet(s) Oral every 6 hours PRN Moderate Pain (4 - 6)  senna 2 Tablet(s) Oral at bedtime PRN Constipation      Vital Signs Last 24 Hrs  T(C): 36.7 (02 Aug 2019 13:33), Max: 36.8 (02 Aug 2019 04:35)  T(F): 98 (02 Aug 2019 13:33), Max: 98.3 (02 Aug 2019 04:35)  HR: 97 (02 Aug 2019 13:33) (80 - 105)  BP: 138/74 (02 Aug 2019 13:33) (123/83 - 146/81)  BP(mean): --  RR: 18 (02 Aug 2019 13:33) (18 - 18)  SpO2: 99% (02 Aug 2019 13:33) (98% - 100%)    PHYSICAL EXAM:  GENERAL: NAD, frail  HEAD:  Atraumatic, Normocephalic  EYES: EOMI, PERRLA, conjunctiva and sclera clear  NECK: Supple, No JVD  CHEST/LUNG: Clear to auscultation bilaterally; No wheeze  HEART: Regular rate and rhythm; No murmurs, rubs, or gallops  ABDOMEN: Soft, Nontender, Nondistended; Bowel sounds present  EXTREMITIES:  2+ bl edema  PSYCH: Anxious  NEUROLOGY: non-focal  SKIN: No rashes or lesions    LABS:                        10.4   9.23  )-----------( 210      ( 02 Aug 2019 08:50 )             33.6     08-02    136  |  89<L>  |  38<H>  ----------------------------<  185<H>  5.7<H>   |  24  |  5.95<H>    Ca    9.5      02 Aug 2019 06:17          Telemetry  70         RADIOLOGY & ADDITIONAL TESTS:    Imaging Personally Reviewed:    Consultant(s) Notes Reviewed:      Care Discussed with Consultants/Other Providers:

## 2019-08-02 NOTE — PROGRESS NOTE ADULT - ASSESSMENT
64 y/o M w/h/o uncontrolled TIDM (HbA1c 8.2% 6/19) c/b neuropathy and retinopathy as well as CAD s/p multiple stents, CHF (EF 50% 10/2018), TIA, PVD s/p b/l fem-pop bypass, ESRD> HD M/T/Th/Sat, presenting with worsening SOB and LE edema. Found to have CHF exacerbation/fluid retention/hyperkalemia and thrombophlebitis. On HD with persistent edema in LEs. Going to HD today. Tolerating POs with erratic and unpredictable PO intake. Explained to pt the need to control amount of fluid and also the quality of liquids she takes. Cheddar cheese/broccoli soup is rich in Sodium and pt should try to avoid it specially now that she is retaining fluids. Pt verbalized understanding.  Glycemic control difficult to achieve since pt doesn't control PO intake and eats at different times of the day. Noted that highest BG levels are during the day with lowest BG levels at night. Will lower Lantus and adjust correction scale ac meals so pt doesn't get high insulin doses during the day that linger to the night causing rebound hypoglycemia.

## 2019-08-02 NOTE — PROGRESS NOTE ADULT - ASSESSMENT
64 f with    ESRD/ fluid overload  - HD  - Nephrology follow Dr. Schmidt    DM type 1  - ADA diet  - BS control  - endocrine follow    CHF cr systolic  - continue Rx  - cardiology follow    PAT  - follow    CAD/ s/p NSTEMI  - continue Rx  - cardiology follow    COPD  - nebs    PVD  - conservative Rx  Anxiety/ Depression control    d/w patient and . QA  Pierce Viera MD pager [phone]

## 2019-08-03 DIAGNOSIS — N18.6 END STAGE RENAL DISEASE: ICD-10-CM

## 2019-08-03 LAB
ANION GAP SERPL CALC-SCNC: 16 MMOL/L — SIGNIFICANT CHANGE UP (ref 5–17)
BUN SERPL-MCNC: 18 MG/DL — SIGNIFICANT CHANGE UP (ref 7–23)
CALCIUM SERPL-MCNC: 9.2 MG/DL — SIGNIFICANT CHANGE UP (ref 8.4–10.5)
CHLORIDE SERPL-SCNC: 95 MMOL/L — LOW (ref 96–108)
CO2 SERPL-SCNC: 24 MMOL/L — SIGNIFICANT CHANGE UP (ref 22–31)
CREAT SERPL-MCNC: 3.64 MG/DL — HIGH (ref 0.5–1.3)
GLUCOSE BLDC GLUCOMTR-MCNC: 233 MG/DL — HIGH (ref 70–99)
GLUCOSE BLDC GLUCOMTR-MCNC: 246 MG/DL — HIGH (ref 70–99)
GLUCOSE BLDC GLUCOMTR-MCNC: 322 MG/DL — HIGH (ref 70–99)
GLUCOSE BLDC GLUCOMTR-MCNC: 327 MG/DL — HIGH (ref 70–99)
GLUCOSE BLDC GLUCOMTR-MCNC: 373 MG/DL — HIGH (ref 70–99)
GLUCOSE SERPL-MCNC: 260 MG/DL — HIGH (ref 70–99)
MAGNESIUM SERPL-MCNC: 2 MG/DL — SIGNIFICANT CHANGE UP (ref 1.6–2.6)
POTASSIUM SERPL-MCNC: 3.8 MMOL/L — SIGNIFICANT CHANGE UP (ref 3.5–5.3)
POTASSIUM SERPL-SCNC: 3.8 MMOL/L — SIGNIFICANT CHANGE UP (ref 3.5–5.3)
SODIUM SERPL-SCNC: 135 MMOL/L — SIGNIFICANT CHANGE UP (ref 135–145)

## 2019-08-03 RX ORDER — CEPHALEXIN 500 MG
500 CAPSULE ORAL EVERY 12 HOURS
Refills: 0 | Status: DISCONTINUED | OUTPATIENT
Start: 2019-08-03 | End: 2019-08-04

## 2019-08-03 RX ORDER — INSULIN GLARGINE 100 [IU]/ML
6 INJECTION, SOLUTION SUBCUTANEOUS AT BEDTIME
Refills: 0 | Status: DISCONTINUED | OUTPATIENT
Start: 2019-08-03 | End: 2019-08-04

## 2019-08-03 RX ORDER — INSULIN LISPRO 100/ML
4 VIAL (ML) SUBCUTANEOUS
Refills: 0 | Status: DISCONTINUED | OUTPATIENT
Start: 2019-08-03 | End: 2019-08-04

## 2019-08-03 RX ADMIN — Medication 81 MILLIGRAM(S): at 13:37

## 2019-08-03 RX ADMIN — OXYCODONE AND ACETAMINOPHEN 2 TABLET(S): 5; 325 TABLET ORAL at 05:37

## 2019-08-03 RX ADMIN — OXYCODONE AND ACETAMINOPHEN 2 TABLET(S): 5; 325 TABLET ORAL at 17:27

## 2019-08-03 RX ADMIN — Medication 3 MILLILITER(S): at 14:44

## 2019-08-03 RX ADMIN — Medication 3 MILLILITER(S): at 18:30

## 2019-08-03 RX ADMIN — OXYCODONE AND ACETAMINOPHEN 2 TABLET(S): 5; 325 TABLET ORAL at 16:56

## 2019-08-03 RX ADMIN — Medication 3 UNIT(S): at 13:32

## 2019-08-03 RX ADMIN — Medication 1: at 01:48

## 2019-08-03 RX ADMIN — Medication 2: at 13:33

## 2019-08-03 RX ADMIN — Medication 3 UNIT(S): at 08:35

## 2019-08-03 RX ADMIN — INSULIN GLARGINE 6 UNIT(S): 100 INJECTION, SOLUTION SUBCUTANEOUS at 22:23

## 2019-08-03 RX ADMIN — OXYCODONE AND ACETAMINOPHEN 2 TABLET(S): 5; 325 TABLET ORAL at 04:46

## 2019-08-03 RX ADMIN — CLOPIDOGREL BISULFATE 75 MILLIGRAM(S): 75 TABLET, FILM COATED ORAL at 13:37

## 2019-08-03 RX ADMIN — Medication 2 UNIT(S): at 22:20

## 2019-08-03 RX ADMIN — Medication 1 TABLET(S): at 13:38

## 2019-08-03 RX ADMIN — Medication 3: at 08:36

## 2019-08-03 RX ADMIN — Medication 25 MILLIGRAM(S): at 13:37

## 2019-08-03 RX ADMIN — Medication 3 MILLILITER(S): at 04:46

## 2019-08-03 RX ADMIN — Medication 4 UNIT(S): at 18:30

## 2019-08-03 RX ADMIN — Medication 50 MILLIGRAM(S): at 16:58

## 2019-08-03 RX ADMIN — Medication 25 MILLIGRAM(S): at 21:29

## 2019-08-03 RX ADMIN — PANTOPRAZOLE SODIUM 40 MILLIGRAM(S): 20 TABLET, DELAYED RELEASE ORAL at 04:46

## 2019-08-03 RX ADMIN — Medication 50 MILLIGRAM(S): at 04:46

## 2019-08-03 RX ADMIN — Medication 25 MILLIGRAM(S): at 04:46

## 2019-08-03 RX ADMIN — Medication 1: at 18:30

## 2019-08-03 RX ADMIN — ATORVASTATIN CALCIUM 20 MILLIGRAM(S): 80 TABLET, FILM COATED ORAL at 21:29

## 2019-08-03 NOTE — PROGRESS NOTE ADULT - SUBJECTIVE AND OBJECTIVE BOX
Chief Complaint: T1DM     History: Feels well with no complaints. Patient ate sandwich (whole) and 2 packs of sheila crackers for bedtime but didn't receive bedtime snack insulin.     MEDICATIONS  (STANDING):  ALBUTerol/ipratropium for Nebulization 3 milliLiter(s) Nebulizer every 6 hours  aspirin enteric coated 81 milliGRAM(s) Oral daily  atorvastatin 20 milliGRAM(s) Oral at bedtime  clopidogrel Tablet 75 milliGRAM(s) Oral daily  dextrose 5%. 1000 milliLiter(s) (50 mL/Hr) IV Continuous <Continuous>  dextrose 50% Injectable 12.5 Gram(s) IV Push once  dextrose 50% Injectable 25 Gram(s) IV Push once  dextrose 50% Injectable 25 Gram(s) IV Push once  docusate sodium 100 milliGRAM(s) Oral three times a day  heparin  Injectable 5000 Unit(s) SubCutaneous every 12 hours  hydrALAZINE 25 milliGRAM(s) Oral three times a day  insulin glargine Injectable (LANTUS) 5 Unit(s) SubCutaneous at bedtime  insulin lispro (HumaLOG) corrective regimen sliding scale   SubCutaneous three times a day before meals  insulin lispro (HumaLOG) corrective regimen sliding scale   SubCutaneous <User Schedule>  insulin lispro (HumaLOG) corrective regimen sliding scale   SubCutaneous at bedtime  insulin lispro Injectable (HumaLOG) 3 Unit(s) SubCutaneous three times a day before meals  insulin lispro Injectable (HumaLOG) 2 Unit(s) SubCutaneous at bedtime  metoprolol succinate ER 50 milliGRAM(s) Oral every 12 hours  multivitamin 1 Tablet(s) Oral daily  pantoprazole    Tablet 40 milliGRAM(s) Oral before breakfast    MEDICATIONS  (PRN):  acetaminophen   Tablet .. 650 milliGRAM(s) Oral every 6 hours PRN Temp greater or equal to 38.5C (101.3F), Mild Pain (1 - 3)  dextrose 40% Gel 15 Gram(s) Oral once PRN Blood Glucose LESS THAN 70 milliGRAM(s)/deciliter  glucagon  Injectable 1 milliGRAM(s) IntraMuscular once PRN Glucose LESS THAN 70 milligrams/deciliter  oxyCODONE    5 mG/acetaminophen 325 mG 2 Tablet(s) Oral every 6 hours PRN Moderate Pain (4 - 6)  senna 2 Tablet(s) Oral at bedtime PRN Constipation      Allergies    No Known Allergies    Intolerances        PHYSICAL EXAM:  VITALS: T(C): 36.7 (08-03-19 @ 04:42)  T(F): 98.1 (08-03-19 @ 04:42), Max: 98.1 (08-03-19 @ 04:42)  HR: 102 (08-03-19 @ 04:42) (72 - 102)  BP: 123/74 (08-03-19 @ 04:42) (123/74 - 138/74)  RR:  (18 - 20)  SpO2:  (94% - 100%)  Wt(kg): --  GENERAL: NAD, well-groomed, well-developed  RESPIRATORY: Clear to auscultation bilaterally; No rales, rhonchi, wheezing, or rubs  CARDIOVASCULAR: Regular rate and rhythm; No murmurs  NEURO: AOx3, moves all extremities spontaenuously   PSYCH:  reactive affect, euthymic mood      POCT Blood Glucose.: 373 mg/dL (08-03-19 @ 08:18)H3+3   POCT Blood Glucose.: 327 mg/dL (08-03-19 @ 01:42)H+1  POCT Blood Glucose.: 160 mg/dL (08-02-19 @ 21:46)H0   POCT Blood Glucose.: 297 mg/dL (08-02-19 @ 17:41)H3+2  POCT Blood Glucose.: 230 mg/dL (08-02-19 @ 11:55)H3+1  POCT Blood Glucose.: 216 mg/dL (08-02-19 @ 07:35)H3+2  POCT Blood Glucose.: 174 mg/dL (08-02-19 @ 03:18)  POCT Blood Glucose.: 220 mg/dL (08-02-19 @ 00:07)  POCT Blood Glucose.: 61 mg/dL (08-01-19 @ 22:48)  POCT Blood Glucose.: 76 mg/dL (08-01-19 @ 22:12)  POCT Blood Glucose.: 79 mg/dL (08-01-19 @ 21:23)  POCT Blood Glucose.: 322 mg/dL (08-01-19 @ 17:16)H3+4  POCT Blood Glucose.: 366 mg/dL (08-01-19 @ 12:36)  POCT Blood Glucose.: 276 mg/dL (08-01-19 @ 08:42)  POCT Blood Glucose.: 206 mg/dL (08-01-19 @ 06:00)  POCT Blood Glucose.: 115 mg/dL (08-01-19 @ 02:41)  POCT Blood Glucose.: 59 mg/dL (08-01-19 @ 02:26)  POCT Blood Glucose.: 57 mg/dL (08-01-19 @ 02:05)  POCT Blood Glucose.: 58 mg/dL (08-01-19 @ 02:03)  POCT Blood Glucose.: 84 mg/dL (08-01-19 @ 01:40)  POCT Blood Glucose.: 53 mg/dL (08-01-19 @ 01:29)  POCT Blood Glucose.: 61 mg/dL (08-01-19 @ 01:10)  POCT Blood Glucose.: 77 mg/dL (08-01-19 @ 01:08)  POCT Blood Glucose.: 304 mg/dL (07-31-19 @ 21:46)  POCT Blood Glucose.: 90 mg/dL (07-31-19 @ 20:42)  POCT Blood Glucose.: 105 mg/dL (07-31-19 @ 18:17)  POCT Blood Glucose.: 469 mg/dL (07-31-19 @ 16:56)  POCT Blood Glucose.: 425 mg/dL (07-31-19 @ 13:46)  POCT Blood Glucose.: 447 mg/dL (07-31-19 @ 12:52)  POCT Blood Glucose.: 410 mg/dL (07-31-19 @ 12:51)      08-02    136  |  89<L>  |  38<H>  ----------------------------<  185<H>  5.7<H>   |  24  |  5.95<H>    EGFR if : 8<L>  EGFR if non : 7<L>    Ca    9.5      08-02            Thyroid Function Tests:      Hemoglobin A1C, Whole Blood: 8.2 % <H> [4.0 - 5.6] (06-16-19 @ 13:24)

## 2019-08-03 NOTE — CONSULT NOTE ADULT - ASSESSMENT
64 Female hx CAD (Cath Feb '19 showing 99% RCA, 100% RPLS, 100% Cx) s/p multiple stents/brachytherapy, ESRD (on HD M/T/T/Sa), DM1 c/b neuropathy and retinopathy, CHF, mod MR, severe AS, RHF, TIA, severe PVD s/p b/l fempop bypass, left subclavian vein stenosis s/p stent, COPD not on O2 pw cc of LE swelling and ct scan suggestive of CHF. also with hyperkalemia and thrombophlebitis     1- esrd  2- hyperkalemia  3- pulm edema  4- htn      hd consent obtained witnessed and placed in chart  hd arrangement for todya   cont with 25 mg tid and lisinopril 40 mg qd  shpt cont with renvela   d.w er team
63 year old woman with PMH uncontrolled DM1 uncontrolled (HbA1c 8.2% 6/19) since age 21 with neuropathy and retinopathy as well as CAD, CHF (EF 50% 10/2018), TIA, PVD s/p b/l fem-pop bypass, ESRD HD M/T/Th/Sat, presenting with 1 day of SOB at rest.
63 YO F with PMH of CAD s/p multiple stents/brachytherapy, ESRD (on HD M/T/T/Sa), mod MR, severe AS,  COPD not on O2 presents with LE swelling and ct scan suggestive of CHF. ENT was consulted for evaluation of Left nose bleed.  Pt was on ASA 81mg, Plavix 75and SC Heparin. Denies  Trauma/ recent surgery/ NG Tube placement. Left Epistaxis was controlled with Surgicel x1, re-enforced with Bacitracin. H/H: 10.4/33.6 stable, INR: 1.00. BP controlled.

## 2019-08-03 NOTE — CONSULT NOTE ADULT - PROBLEM SELECTOR RECOMMENDATION 9
- Left Epistaxis was controlled with Surgicel x1, re-enforced with Bacitracin.   - gram-positive abx coverage for duration of packing placement, c/w Keflex, can d/c on packing removal.   - Strict blood pressure control.  - Nasal saline, 2 sprays to both nares 4 times a day.  - Avoid nasal trauma; no nose rubbing, blowing or manipulating nasal packing.   - Sneeze with mouth open and pinching nares.  - Avoid bending with head blow the waist.    - No heavy lifting.  - Monitor CBC.   - ENT will follow.  - Call with questions.     JESSICA HERNANDEZ PA-C.   # [id number]  ENT. - Left Epistaxis was controlled with Surgicel x1, re-enforced with Bacitracin.   - gram-positive abx coverage for duration of packing placement, c/w Keflex , can d/c on packing removal.   - Strict blood pressure control.  - Nasal saline, 2 sprays to both nares 4 times a day.  - Avoid nasal trauma; no nose rubbing, blowing or manipulating nasal packing.   - Sneeze with mouth open and pinching nares.  - Avoid bending with head blow the waist.    - No heavy lifting.  - Monitor CBC.   - ENT will follow.  - Call with questions.     JESSICA HERNANDEZ PA-C.   # [phone]  ENT.

## 2019-08-03 NOTE — PROGRESS NOTE ADULT - ASSESSMENT
64 y/o M w/h/o uncontrolled TIDM (HbA1c 8.2% 6/19) c/b neuropathy and retinopathy as well as CAD s/p multiple stents, CHF (EF 50% 10/2018), TIA, PVD s/p b/l fem-pop bypass, ESRD> HD M/T/Th/Sat, presenting with worsening SOB and LE edema. Found to have CHF exacerbation/fluid retention/hyperkalemia and thrombophlebitis. On HD with persistent edema in LEs. Going to HD today. Tolerating POs with erratic and unpredictable PO intake. Explained to pt the need to control amount of fluid and also the quality of liquids she takes. Cheddar cheese/broccoli soup is rich in Sodium and pt should try to avoid it specially now that she is retaining fluids. Pt verbalized understanding.  Glycemic control difficult to achieve since pt doesn't control PO intake and eats at different times of the day. Noted that highest BG levels are during the day with lowest BG levels at night. SS was lowered along with lantus but now becoming hyperglycemic. Increasing lantus to 6 units and humalog meal time to 4u.

## 2019-08-03 NOTE — PROGRESS NOTE ADULT - PROBLEM SELECTOR PLAN 1
-Test BG ac and hs and 2am  -Change Lantus dose to 6 units q hs  -Humalog 4 units ac meals.  -Adjusted Humalog low dose correction scales ac meal to 1-3 units to prevent insulin stacking.  and  -C/w hs and add 2am low scale (1-3Units) for overnight hyperglycemia.  -c/w Humalog 2 units at hs if pt eating snack. Hold if not eating. (spoke with nurse to remind PM nurse to give bedtime snack insulin)   Contact info: [phone] (24/7). pager [phone]

## 2019-08-03 NOTE — PROGRESS NOTE ADULT - PROBLEM SELECTOR PLAN 1
repeat HD today for additional ultrafiltration the setting of ongoing volume overload/CHF  holding lisinopril in setting of hypotension and hyperkalemia  dose meds for eGFR<15

## 2019-08-03 NOTE — PROGRESS NOTE ADULT - SUBJECTIVE AND OBJECTIVE BOX
[facility]   [phone]  Dr. Urban (covering for Dr. Burger)  RENAL FOLLOW UP NOTE  --------------------------------------------------------------------------------  Patient seen and examined.  c/o fatigue.  Awaiting HD    PAST HISTORY  --------------------------------------------------------------------------------  No significant changes to PMH, PSH, FHx, SHx, unless otherwise noted    ALLERGIES & MEDICATIONS  --------------------------------------------------------------------------------  Allergies    No Known Allergies    Intolerances      Standing Inpatient Medications  ALBUTerol/ipratropium for Nebulization 3 milliLiter(s) Nebulizer every 6 hours  aspirin enteric coated 81 milliGRAM(s) Oral daily  atorvastatin 20 milliGRAM(s) Oral at bedtime  clopidogrel Tablet 75 milliGRAM(s) Oral daily  dextrose 5%. 1000 milliLiter(s) IV Continuous <Continuous>  dextrose 50% Injectable 12.5 Gram(s) IV Push once  dextrose 50% Injectable 25 Gram(s) IV Push once  dextrose 50% Injectable 25 Gram(s) IV Push once  docusate sodium 100 milliGRAM(s) Oral three times a day  heparin  Injectable 5000 Unit(s) SubCutaneous every 12 hours  hydrALAZINE 25 milliGRAM(s) Oral three times a day  insulin glargine Injectable (LANTUS) 6 Unit(s) SubCutaneous at bedtime  insulin lispro (HumaLOG) corrective regimen sliding scale   SubCutaneous three times a day before meals  insulin lispro (HumaLOG) corrective regimen sliding scale   SubCutaneous <User Schedule>  insulin lispro (HumaLOG) corrective regimen sliding scale   SubCutaneous at bedtime  insulin lispro Injectable (HumaLOG) 2 Unit(s) SubCutaneous at bedtime  insulin lispro Injectable (HumaLOG) 4 Unit(s) SubCutaneous three times a day before meals  metoprolol succinate ER 50 milliGRAM(s) Oral every 12 hours  multivitamin 1 Tablet(s) Oral daily  pantoprazole    Tablet 40 milliGRAM(s) Oral before breakfast    PRN Inpatient Medications  acetaminophen   Tablet .. 650 milliGRAM(s) Oral every 6 hours PRN  dextrose 40% Gel 15 Gram(s) Oral once PRN  glucagon  Injectable 1 milliGRAM(s) IntraMuscular once PRN  oxyCODONE    5 mG/acetaminophen 325 mG 2 Tablet(s) Oral every 6 hours PRN  senna 2 Tablet(s) Oral at bedtime PRN      FOCUSED REVIEW OF SYSTEMS  --------------------------------------------------------------------------------  +weakness/fatigue  denies CP/palpitations  denies SOB/cough      VITALS/PHYSICAL EXAM  --------------------------------------------------------------------------------  T(C): 36.7 (08-03-19 @ 14:06), Max: 36.7 (08-03-19 @ 04:42)  HR: 73 (08-03-19 @ 14:06) (72 - 102)  BP: 123/71 (08-03-19 @ 14:06) (117/72 - 135/70)  RR: 18 (08-03-19 @ 14:06) (18 - 20)  SpO2: 93% (08-03-19 @ 14:06) (93% - 100%)  Wt(kg): --        08-02-19 @ 07:01  -  08-03-19 @ 07:00  --------------------------------------------------------  IN: 1080 mL / OUT: 0 mL / NET: 1080 mL    08-03-19 @ 07:01  -  08-03-19 @ 15:28  --------------------------------------------------------  IN: 600 mL / OUT: 0 mL / NET: 600 mL      Physical Exam:  	Gen: NAD, frail-appearing  	Pulm: decreased breath sounds b/l ant/lateral fields  	CV: RRR, S1S2  	Abd: +BS, soft, nontender/nondistended  	: No suprapubic tenderness.  no irene          Extremity: No cyanosis, b/l pedal edema  	Neuro: A&O  to self, place  	Access: ANA RAMOS + lon      LABS/STUDIES  --------------------------------------------------------------------------------              10.4   9.23  >-----------<  210      [08-02-19 @ 08:50]              33.6     136  |  89  |  38  ----------------------------<  185      [08-02-19 @ 06:17]  5.7   |  24  |  5.95        Ca     9.5     [08-02-19 @ 06:17]              Creatinine Trend:  SCr 5.95 [08-02 @ 06:17]  SCr 4.63 [08-01 @ 06:00]  SCr 3.96 [07-31 @ 19:10]  SCr 5.27 [07-31 @ 06:26]  SCr 3.05 [07-30 @ 01:25]                  Iron 42, TIBC 253, %sat 17      [06-17-19 @ 17:54]  Ferritin 1838      [06-17-19 @ 18:06]  PTH -- (Ca 9.6)      [06-17-19 @ 18:06]   177  PTH -- (Ca 7.8)      [03-29-19 @ 20:38]   73  PTH -- (Ca 7.3)      [02-22-19 @ 02:49]   59  HbA1c 8.2      [06-16-19 @ 13:24]  TSH 0.71      [11-17-18 @ 08:25]

## 2019-08-03 NOTE — PROGRESS NOTE ADULT - ASSESSMENT
64 Female hx CAD (Cath Feb '19 showing 99% RCA, 100% RPLS, 100% Cx) s/p multiple stents/brachytherapy, ESRD (on HD M/T/T/Sa), DM1 c/b neuropathy and retinopathy, CHF, mod MR, severe AS, RHF, TIA, severe PVD s/p b/l fempop bypass, left subclavian vein stenosis s/p stent, COPD not on O2 pw cc of LE swelling and ct scan suggestive of CHF. also with hyperkalemia and thrombophlebitis

## 2019-08-03 NOTE — PROGRESS NOTE ADULT - SUBJECTIVE AND OBJECTIVE BOX
CARDIOLOGY FOLLOW UP NOTE - DR. PORTILLO (for dr. martinez)    Subjective:    no chest pain, sob, palpitations    PHYSICAL EXAM:  T(C): 36.7 (19 @ 14:06), Max: 36.7 (19 @ 04:42)  HR: 73 (19 @ 14:06) (72 - 102)  BP: 123/71 (19 @ 14:06) (117/72 - 135/70)  RR: 18 (19 @ 14:06) (18 - 20)  SpO2: 93% (19 @ 14:06) (93% - 100%)  Wt(kg): --  I&O's Summary    02 Aug 2019 07:  -  03 Aug 2019 07:00  --------------------------------------------------------  IN: 1080 mL / OUT: 0 mL / NET: 1080 mL    03 Aug 2019 07:01  -  03 Aug 2019 14:14  --------------------------------------------------------  IN: 600 mL / OUT: 0 mL / NET: 600 mL      Daily     Daily Weight in k.1 (03 Aug 2019 08:03)    Appearance: Normal	  Cardiovascular: Normal S1 S2,RRR, No JVD, No murmurs  Respiratory: Lungs clear to auscultation	  Gastrointestinal:  Soft, Non-tender, + BS	  Extremities: Normal range of motion, No clubbing, cyanosis or edema      Home Medications:  aspirin 81 mg oral delayed release tablet: 1 tab(s) orally once a day (2019 10:43)  atorvastatin 20 mg oral tablet: 1 tab(s) orally once a day (2019 10:43)  Basaglar KwikPen 100 units/mL subcutaneous solution: 10 units subcutaneously at bedtime (2019 10:43)  clopidogrel 75 mg oral tablet: 1 tab(s) orally once a day (2019 10:43)  hydrALAZINE 25 mg oral tablet: 4 tab(s) orally 3 times a day (2019 10:43)  insulin lispro 100 units/mL injectable solution: 10 unit(s) injectable 3 times a day (before meals) (2019 10:43)  lisinopril 40 mg oral tablet: 1 tab(s) orally once a day (2019 10:43)  metoprolol succinate 50 mg oral tablet, extended release: 1 tab(s) orally once a day (2019 10:43)  Multiple Vitamins oral tablet: 1 tab(s) orally once a day (2019 10:43)  pantoprazole 40 mg oral delayed release tablet: 1 tab(s) orally once a day (2019 10:43)  Percocet 5/325 oral tablet: 1 tab(s) orally 2 times a day (2019 10:43)  sevelamer carbonate 800 mg oral tablet: 2 tab(s) orally 3 times a day (with meals) (2019 10:43)      MEDICATIONS  (STANDING):  ALBUTerol/ipratropium for Nebulization 3 milliLiter(s) Nebulizer every 6 hours  aspirin enteric coated 81 milliGRAM(s) Oral daily  atorvastatin 20 milliGRAM(s) Oral at bedtime  clopidogrel Tablet 75 milliGRAM(s) Oral daily  dextrose 5%. 1000 milliLiter(s) (50 mL/Hr) IV Continuous <Continuous>  dextrose 50% Injectable 12.5 Gram(s) IV Push once  dextrose 50% Injectable 25 Gram(s) IV Push once  dextrose 50% Injectable 25 Gram(s) IV Push once  docusate sodium 100 milliGRAM(s) Oral three times a day  heparin  Injectable 5000 Unit(s) SubCutaneous every 12 hours  hydrALAZINE 25 milliGRAM(s) Oral three times a day  insulin glargine Injectable (LANTUS) 6 Unit(s) SubCutaneous at bedtime  insulin lispro (HumaLOG) corrective regimen sliding scale   SubCutaneous three times a day before meals  insulin lispro (HumaLOG) corrective regimen sliding scale   SubCutaneous <User Schedule>  insulin lispro (HumaLOG) corrective regimen sliding scale   SubCutaneous at bedtime  insulin lispro Injectable (HumaLOG) 2 Unit(s) SubCutaneous at bedtime  insulin lispro Injectable (HumaLOG) 4 Unit(s) SubCutaneous three times a day before meals  metoprolol succinate ER 50 milliGRAM(s) Oral every 12 hours  multivitamin 1 Tablet(s) Oral daily  pantoprazole    Tablet 40 milliGRAM(s) Oral before breakfast      TELEMETRY: 	    ECG:  	  RADIOLOGY:   DIAGNOSTIC TESTING:  [ ] Echocardiogram:  [ ] Catheterization:  [ ] Stress Test:    OTHER: 	    LABS:	 	    CARDIAC MARKERS:  Troponin T, High Sensitivity Result: 235 ng/L ( @ 07:56)  Troponin T, High Sensitivity Result: 231 ng/L ( @ 06:41)  Troponin T, High Sensitivity Result: 233 ng/L ( @ 01:25)                                10.4   9.23  )-----------( 210      ( 02 Aug 2019 08:50 )             33.6         136  |  89<L>  |  38<H>  ----------------------------<  185<H>  5.7<H>   |  24  |  5.95<H>    Ca    9.5      02 Aug 2019 06:17      proBNP:     Lipid Profile:   HgA1c:     Creatinine, Serum: 5.95 mg/dL (19 @ 06:17)  Creatinine, Serum: 4.63 mg/dL (19 @ 06:00)  Creatinine, Serum: 3.96 mg/dL (19 @ 19:10)

## 2019-08-03 NOTE — PROGRESS NOTE ADULT - ASSESSMENT
64 f with    ESRD/ fluid overload  - HD  - Nephrology follow Dr. Schmidt    DM type 1  - ADA diet  - BS control  - endocrine follow    CHF cr systolic  - continue Rx  - cardiology follow    PAT  - follow    CAD/ s/p NSTEMI  - continue Rx  - cardiology follow    COPD  - nebs    PVD  - conservative Rx  Anxiety/ Depression control    d/w patient  QA  Pierce Viera MD pager [phone]

## 2019-08-03 NOTE — CONSULT NOTE ADULT - SUBJECTIVE AND OBJECTIVE BOX
CC: Left     HPI:   **Include at least 4 modifiers (Onset? Duration? Quality? Radiation? Severity? Laterality? What makes it better or worse?)**      PAST MEDICAL & SURGICAL HISTORY:  COPD (chronic obstructive pulmonary disease)  Localized enlarged lymph nodes  CHF (congestive heart failure): EF 40-45%  Subclavian vein stenosis, left: s/p stent  DKA, type 1: 1/2015  ACS (acute coronary syndrome): 1/2015 - cath revealed 100% ostial stenosis not amenable to PCI - medical management  TIA (transient ischemic attack): x 2 - 8-9 years ago prior to ASD/VSD repair  CAD (coronary artery disease): s/p stents  Gout: past  CVA (cerebral infarction): with no residual, 8 yrs ago, prior to heart surgery - ST memory loss  Peripheral vascular disease: occluded left fem-pop bypass 5/2015  Diabetes mellitus type 1: Insulin Dependent -  ESRD (end stage renal disease): dialysis  M, tue, thursday, saturday  Hyperlipidemia  Status post device closure of ASD: &quot;clamshell&quot;  History of cardiac catheterization: 1/2015 - no intervention  S/P femoral-popliteal bypass surgery: L and R in 2013 with graft; 5/2015 CFA angioplasty left and ileofemoral endarterectomywith vein patch angioplasty of left fem-pop bypass graft  Multiple vascular surgery both leg, left fempop bypass revision 11/2015  AV (arteriovenous fistula): Left AV graft; revision with stent placement 2-3 years ago  S/P cholecystectomy    Allergies    No Known Allergies    Intolerances      MEDICATIONS  (STANDING):  ALBUTerol/ipratropium for Nebulization 3 milliLiter(s) Nebulizer every 6 hours  aspirin enteric coated 81 milliGRAM(s) Oral daily  atorvastatin 20 milliGRAM(s) Oral at bedtime  cephalexin 500 milliGRAM(s) Oral every 12 hours  clopidogrel Tablet 75 milliGRAM(s) Oral daily  dextrose 5%. 1000 milliLiter(s) (50 mL/Hr) IV Continuous <Continuous>  dextrose 50% Injectable 12.5 Gram(s) IV Push once  dextrose 50% Injectable 25 Gram(s) IV Push once  dextrose 50% Injectable 25 Gram(s) IV Push once  docusate sodium 100 milliGRAM(s) Oral three times a day  heparin  Injectable 5000 Unit(s) SubCutaneous every 12 hours  hydrALAZINE 25 milliGRAM(s) Oral three times a day  insulin glargine Injectable (LANTUS) 6 Unit(s) SubCutaneous at bedtime  insulin lispro (HumaLOG) corrective regimen sliding scale   SubCutaneous three times a day before meals  insulin lispro (HumaLOG) corrective regimen sliding scale   SubCutaneous <User Schedule>  insulin lispro (HumaLOG) corrective regimen sliding scale   SubCutaneous at bedtime  insulin lispro Injectable (HumaLOG) 2 Unit(s) SubCutaneous at bedtime  insulin lispro Injectable (HumaLOG) 4 Unit(s) SubCutaneous three times a day before meals  metoprolol succinate ER 50 milliGRAM(s) Oral every 12 hours  multivitamin 1 Tablet(s) Oral daily  pantoprazole    Tablet 40 milliGRAM(s) Oral before breakfast    MEDICATIONS  (PRN):  acetaminophen   Tablet .. 650 milliGRAM(s) Oral every 6 hours PRN Temp greater or equal to 38.5C (101.3F), Mild Pain (1 - 3)  dextrose 40% Gel 15 Gram(s) Oral once PRN Blood Glucose LESS THAN 70 milliGRAM(s)/deciliter  glucagon  Injectable 1 milliGRAM(s) IntraMuscular once PRN Glucose LESS THAN 70 milligrams/deciliter  oxyCODONE    5 mG/acetaminophen 325 mG 2 Tablet(s) Oral every 6 hours PRN Moderate Pain (4 - 6)  senna 2 Tablet(s) Oral at bedtime PRN Constipation      Social History: **??**    Family history: **??**    ROS:   ENT: all negative except as noted in HPI   CV: denies palpitations  Pulm: denies SOB, cough, hemoptysis  GI: denies change in apetite, indigestion, n/v  : denies pertinent urinary symptoms, urgency  Neuro: denies numbness/tingling, loss of sensation  Psych: denies anxiety  MS: denies muscle weakness, instability  Heme: denies easy bruising or bleeding  Endo: denies heat/cold intolerance, excessive sweating  Vascular: denies LE edema    Vital Signs Last 24 Hrs  T(C): 37.1 (03 Aug 2019 20:23), Max: 37.1 (03 Aug 2019 20:23)  T(F): 98.8 (03 Aug 2019 20:23), Max: 98.8 (03 Aug 2019 20:23)  HR: 98 (03 Aug 2019 20:23) (72 - 102)  BP: 123/73 (03 Aug 2019 20:23) (104/54 - 123/74)  BP(mean): --  RR: 17 (03 Aug 2019 20:23) (17 - 18)  SpO2: 96% (03 Aug 2019 20:23) (93% - 98%)                          10.4   9.23  )-----------( 210      ( 02 Aug 2019 08:50 )             33.6    08-03    135  |  95<L>  |  18  ----------------------------<  260<H>  3.8   |  24  |  3.64<H>    Ca    9.2      03 Aug 2019 16:13  Mg     2.0     08-03         PHYSICAL EXAM:  Gen: NAD  Skin: No rashes, bruises, or lesions  Head: Normocephalic, Atraumatic  Face: no edema, erythema, or fluctuance. Parotid glands soft without mass  Eyes: no scleral injection  Ears: Right - ear canal clear, TM intact without effusion or erythema. No evidence of any fluid drainage. No mastoid tenderness, erythema, or ear bulging            Left - ear canal clear, TM intact without effusion or erythema. No evidence of any fluid drainage. No mastoid tenderness, erythema, or ear bulging  Nose: Nares bilaterally patent, no discharge  Mouth: No Stridor / Drooling / Trismus.  Mucosa moist, tongue/uvula midline, oropharynx clear  Neck: Flat, supple, no lymphadenopathy, trachea midline, no masses  Lymphatic: No lymphadenopathy  Resp: breathing easily, no stridor  CV: no peripheral edema/cyanosis  GI: nondistended   Peripheral vascular: no JVD or edema  Neuro: facial nerve intact, no facial droop        Diagnostic Nasal Endoscopy: (Scope #2 used)    Fiberoptic Indirect laryngoscopy:  (Scope #2 used)        IMAGING/ADDITIONAL STUDIES: CC: Left Nose bleed.     HPI: 63 YO F with PMH of  CAD (Cath Feb '19 showing 99% RCA, 100% RPLS, 100% Cx) s/p multiple stents/brachytherapy, ESRD (on HD M/T/T/Sa), DM1 c/b neuropathy and retinopathy, CHF, mod MR, severe AS, RHF, TIA, severe PVD s/p b/l fempop bypass, left subclavian vein stenosis s/p stent, COPD not on O2 presents with LE swelling and ct scan suggestive of CHF. ENT was consulted for evaluation of Left nose bleed. As per pt, tried to blow the nose an hour ago and started bleeding since then. Pt was on ASA 81mg, Plavix 75and SC Heparin. Denies  Trauma/ recent surgery/ NG Tube placement. Per pt, usually have episodes of Epistaxis, self resolving, sometimes have to apply pressure. But never had an episode like this. Otherwise denies cough/ SOB/ Hoarseness/ Dysphagia, fever/ chills, CP/Palpitations/ Diaphoresis, HA/Dizziness/ Blurry vision/syncope, recent travel/sick contacts.     PAST MEDICAL & SURGICAL HISTORY:  COPD (chronic obstructive pulmonary disease)  Localized enlarged lymph nodes  CHF (congestive heart failure): EF 40-45%  Subclavian vein stenosis, left: s/p stent  DKA, type 1: 1/2015  ACS (acute coronary syndrome): 1/2015 - cath revealed 100% ostial stenosis not amenable to PCI - medical management  TIA (transient ischemic attack): x 2 - 8-9 years ago prior to ASD/VSD repair  CAD (coronary artery disease): s/p stents  Gout: past  CVA (cerebral infarction): with no residual, 8 yrs ago, prior to heart surgery - ST memory loss  Peripheral vascular disease: occluded left fem-pop bypass 5/2015  Diabetes mellitus type 1: Insulin Dependent -  ESRD (end stage renal disease): dialysis  M, tue, thursday, saturday  Hyperlipidemia  Status post device closure of ASD: &quot;clamshell&quot;  History of cardiac catheterization: 1/2015 - no intervention  S/P femoral-popliteal bypass surgery: L and R in 2013 with graft; 5/2015 CFA angioplasty left and ileofemoral endarterectomywith vein patch angioplasty of left fem-pop bypass graft  Multiple vascular surgery both leg, left fempop bypass revision 11/2015  AV (arteriovenous fistula): Left AV graft; revision with stent placement 2-3 years ago  S/P cholecystectomy    Allergies.   No Known Allergies    Intolerances.  MEDICATIONS  (STANDING):  ALBUTerol/ipratropium for Nebulization 3 milliLiter(s) Nebulizer every 6 hours  aspirin enteric coated 81 milliGRAM(s) Oral daily  atorvastatin 20 milliGRAM(s) Oral at bedtime  cephalexin 500 milliGRAM(s) Oral every 12 hours  clopidogrel Tablet 75 milliGRAM(s) Oral daily  dextrose 5%. 1000 milliLiter(s) (50 mL/Hr) IV Continuous <Continuous>  dextrose 50% Injectable 12.5 Gram(s) IV Push once  dextrose 50% Injectable 25 Gram(s) IV Push once  dextrose 50% Injectable 25 Gram(s) IV Push once  docusate sodium 100 milliGRAM(s) Oral three times a day  heparin  Injectable 5000 Unit(s) SubCutaneous every 12 hours  hydrALAZINE 25 milliGRAM(s) Oral three times a day  insulin glargine Injectable (LANTUS) 6 Unit(s) SubCutaneous at bedtime  insulin lispro (HumaLOG) corrective regimen sliding scale   SubCutaneous three times a day before meals  insulin lispro (HumaLOG) corrective regimen sliding scale   SubCutaneous <User Schedule>  insulin lispro (HumaLOG) corrective regimen sliding scale   SubCutaneous at bedtime  insulin lispro Injectable (HumaLOG) 2 Unit(s) SubCutaneous at bedtime  insulin lispro Injectable (HumaLOG) 4 Unit(s) SubCutaneous three times a day before meals  metoprolol succinate ER 50 milliGRAM(s) Oral every 12 hours  multivitamin 1 Tablet(s) Oral daily  pantoprazole    Tablet 40 milliGRAM(s) Oral before breakfast    MEDICATIONS  (PRN):  acetaminophen   Tablet .. 650 milliGRAM(s) Oral every 6 hours PRN Temp greater or equal to 38.5C (101.3F), Mild Pain (1 - 3)  dextrose 40% Gel 15 Gram(s) Oral once PRN Blood Glucose LESS THAN 70 milliGRAM(s)/deciliter  glucagon  Injectable 1 milliGRAM(s) IntraMuscular once PRN Glucose LESS THAN 70 milligrams/deciliter  oxyCODONE    5 mG/acetaminophen 325 mG 2 Tablet(s) Oral every 6 hours PRN Moderate Pain (4 - 6)  senna 2 Tablet(s) Oral at bedtime PRN Constipation    Social History:   Family history:   ROS:   ENT: all negative except as noted in HPI   CV: denies palpitations  Pulm: denies SOB, cough, hemoptysis  GI: denies change in apetite, indigestion, n/v  : denies pertinent urinary symptoms, urgency  Neuro: denies numbness/tingling, loss of sensation  Psych: denies anxiety  MS: denies muscle weakness, instability  Heme: denies easy bruising or bleeding  Endo: denies heat/cold intolerance, excessive sweating  Vascular: denies LE edema    Vital Signs Last 24 Hrs  T(C): 37.1 (03 Aug 2019 20:23), Max: 37.1 (03 Aug 2019 20:23)  T(F): 98.8 (03 Aug 2019 20:23), Max: 98.8 (03 Aug 2019 20:23)  HR: 98 (03 Aug 2019 20:23) (72 - 102)  BP: 123/73 (03 Aug 2019 20:23) (104/54 - 123/74)  BP(mean): --  RR: 17 (03 Aug 2019 20:23) (17 - 18)  SpO2: 96% (03 Aug 2019 20:23) (93% - 98%)                        10.4   9.23  )-----------( 210      ( 02 Aug 2019 08:50 )             33.6    08-03    135  |  95<L>  |  18  ----------------------------<  260<H>  3.8   |  24  |  3.64<H>    Ca    9.2      03 Aug 2019 16:13  Mg     2.0     08-03         PHYSICAL EXAM:  Gen: NAD, On RA.   Skin: No rashes, bruises, or lesions.  Head: Normocephalic, Atraumatic.  Face: no edema, erythema, or fluctuance. Parotid glands soft without mass.  Eyes: no scleral injection.  Nose: Actively bleeding in Left Nare, no source can be visualized. Right Nare patent, no discharge/ Bleeding.   Mouth: No Stridor / Drooling / Trismus.  Mucosa moist, tongue/uvula midline, posterior oropharynx clear.  Neck: Flat, supple, no lymphadenopathy, trachea midline, no masses  Lymphatic: No lymphadenopathy  Resp: breathing easily, no stridor  CV: no peripheral edema/cyanosis  GI: nondistended   Peripheral vascular: no JVD or edema  Neuro: facial nerve intact, no facial droop CC: Left Nose bleed.     HPI: 65 YO F with PMH of  CAD (Cath Feb '19 showing 99% RCA, 100% RPLS, 100% Cx) s/p multiple stents/brachytherapy, ESRD (on HD M/T/T/Sa), DM1 c/b neuropathy and retinopathy, CHF, mod MR, severe AS, RHF, TIA, severe PVD s/p b/l fempop bypass, left subclavian vein stenosis s/p stent, COPD not on O2 presents with LE swelling and ct scan suggestive of CHF. ENT was consulted for evaluation of Left nose bleed. As per pt, tried to blow the nose an hour ago and started bleeding since then. Pt was on ASA 81mg, Plavix 75and SC Heparin. Denies  Trauma/ recent surgery/ NG Tube placement. Per pt, usually have episodes of Epistaxis in the past, self resolving, sometimes have to apply pressure. But never had an episode like this. Otherwise denies cough/ SOB/ Hoarseness/ Dysphagia, fever/ chills, CP/Palpitations/ Diaphoresis, HA/Dizziness/ Blurry vision/syncope, recent travel/sick contacts.     PAST MEDICAL & SURGICAL HISTORY:  COPD (chronic obstructive pulmonary disease)  Localized enlarged lymph nodes  CHF (congestive heart failure): EF 40-45%  Subclavian vein stenosis, left: s/p stent  DKA, type 1: 1/2015  ACS (acute coronary syndrome): 1/2015 - cath revealed 100% ostial stenosis not amenable to PCI - medical management  TIA (transient ischemic attack): x 2 - 8-9 years ago prior to ASD/VSD repair  CAD (coronary artery disease): s/p stents  Gout: past  CVA (cerebral infarction): with no residual, 8 yrs ago, prior to heart surgery - ST memory loss  Peripheral vascular disease: occluded left fem-pop bypass 5/2015  Diabetes mellitus type 1: Insulin Dependent -  ESRD (end stage renal disease): dialysis  M, tue, thursday, saturday  Hyperlipidemia  Status post device closure of ASD: &quot;clamshell&quot;  History of cardiac catheterization: 1/2015 - no intervention  S/P femoral-popliteal bypass surgery: L and R in 2013 with graft; 5/2015 CFA angioplasty left and ileofemoral endarterectomywith vein patch angioplasty of left fem-pop bypass graft  Multiple vascular surgery both leg, left fempop bypass revision 11/2015  AV (arteriovenous fistula): Left AV graft; revision with stent placement 2-3 years ago  S/P cholecystectomy    Allergies.   No Known Allergies    Intolerances.  MEDICATIONS  (STANDING):  ALBUTerol/ipratropium for Nebulization 3 milliLiter(s) Nebulizer every 6 hours  aspirin enteric coated 81 milliGRAM(s) Oral daily  atorvastatin 20 milliGRAM(s) Oral at bedtime  cephalexin 500 milliGRAM(s) Oral every 12 hours  clopidogrel Tablet 75 milliGRAM(s) Oral daily  dextrose 5%. 1000 milliLiter(s) (50 mL/Hr) IV Continuous <Continuous>  dextrose 50% Injectable 12.5 Gram(s) IV Push once  dextrose 50% Injectable 25 Gram(s) IV Push once  dextrose 50% Injectable 25 Gram(s) IV Push once  docusate sodium 100 milliGRAM(s) Oral three times a day  heparin  Injectable 5000 Unit(s) SubCutaneous every 12 hours  hydrALAZINE 25 milliGRAM(s) Oral three times a day  insulin glargine Injectable (LANTUS) 6 Unit(s) SubCutaneous at bedtime  insulin lispro (HumaLOG) corrective regimen sliding scale   SubCutaneous three times a day before meals  insulin lispro (HumaLOG) corrective regimen sliding scale   SubCutaneous <User Schedule>  insulin lispro (HumaLOG) corrective regimen sliding scale   SubCutaneous at bedtime  insulin lispro Injectable (HumaLOG) 2 Unit(s) SubCutaneous at bedtime  insulin lispro Injectable (HumaLOG) 4 Unit(s) SubCutaneous three times a day before meals  metoprolol succinate ER 50 milliGRAM(s) Oral every 12 hours  multivitamin 1 Tablet(s) Oral daily  pantoprazole    Tablet 40 milliGRAM(s) Oral before breakfast    MEDICATIONS  (PRN):  acetaminophen   Tablet .. 650 milliGRAM(s) Oral every 6 hours PRN Temp greater or equal to 38.5C (101.3F), Mild Pain (1 - 3)  dextrose 40% Gel 15 Gram(s) Oral once PRN Blood Glucose LESS THAN 70 milliGRAM(s)/deciliter  glucagon  Injectable 1 milliGRAM(s) IntraMuscular once PRN Glucose LESS THAN 70 milligrams/deciliter  oxyCODONE    5 mG/acetaminophen 325 mG 2 Tablet(s) Oral every 6 hours PRN Moderate Pain (4 - 6)  senna 2 Tablet(s) Oral at bedtime PRN Constipation    Social History:   Family history:   ROS:   ENT: all negative except as noted in HPI   CV: denies palpitations  Pulm: denies SOB, cough, hemoptysis  GI: denies change in apetite, indigestion, n/v  : denies pertinent urinary symptoms, urgency  Neuro: denies numbness/tingling, loss of sensation  Psych: denies anxiety  MS: denies muscle weakness, instability  Heme: denies easy bruising or bleeding  Endo: denies heat/cold intolerance, excessive sweating    Vital Signs Last 24 Hrs  T(C): 37.1 (03 Aug 2019 20:23), Max: 37.1 (03 Aug 2019 20:23)  T(F): 98.8 (03 Aug 2019 20:23), Max: 98.8 (03 Aug 2019 20:23)  HR: 98 (03 Aug 2019 20:23) (72 - 102)  BP: 123/73 (03 Aug 2019 20:23) (104/54 - 123/74)  BP(mean): --  RR: 17 (03 Aug 2019 20:23) (17 - 18)  SpO2: 96% (03 Aug 2019 20:23) (93% - 98%)                        10.4   9.23  )-----------( 210      ( 02 Aug 2019 08:50 )             33.6    08-03    135  |  95<L>  |  18  ----------------------------<  260<H>  3.8   |  24  |  3.64<H>    Ca    9.2      03 Aug 2019 16:13  Mg     2.0     08-03     PHYSICAL EXAM:  Gen: NAD, On RA.   Skin: No rashes, bruises, or lesions.  Head: Normocephalic, Atraumatic.  Face: no edema, erythema, or fluctuance. Parotid glands soft without mass.  Eyes: no scleral injection.  Nose: Actively bleeding in Left Nare, no source can be visualized. Right Nare patent, no discharge/ Bleeding.   Mouth: No Stridor / Drooling / Trismus.  Mucosa moist, tongue/uvula midline, posterior oropharynx clear.  Neck: Flat, supple, no lymphadenopathy, trachea midline, no masses  Lymphatic: No lymphadenopathy  Resp: breathing easily, no stridor  CV: no peripheral edema/cyanosis  GI: nondistended   Neuro: facial nerve intact, no facial droop

## 2019-08-03 NOTE — PROGRESS NOTE ADULT - ASSESSMENT
Assessment:  -chest pain with mild ekg changes and stable hs trop  -CHF exacerbation  -NSVT  -pAT  -volume overload  -ischemic cardiomyopathy, cad s/p multiple stents  -esrd on hd  -PAD s/p prior intervention       Recs:  -chest pain with known extensive CAD and ICM. s/p [facility] 2/20/2019 --> severe and diffuse instent restenosis along RCA --> unable to stent due to multiple layers of stenting and prior brachytherapy  -will consider complex intervention if true ischemic and refractory sx develop   -c/w beta blockers for nsvt/pat/cad (as above).c/w  toprol 50mg bid, cont nitrates  -hx of prolonged qtc. avoid qtc prolonging meds  -s/p TTE 2019: mod-severe MR, pseudo AS (low flow-low gradient), mod-severe LV dysfunction --> recent CTS consult with dr hebert appreciated. plan is for medical management given surgical risk and patient preference  -c/w asa, plavix, statin for ischemic cardiomyopathy/CAD/PAD  -dvt ppx

## 2019-08-04 DIAGNOSIS — R04.0 EPISTAXIS: ICD-10-CM

## 2019-08-04 LAB
ANION GAP SERPL CALC-SCNC: 17 MMOL/L — SIGNIFICANT CHANGE UP (ref 5–17)
BUN SERPL-MCNC: 18 MG/DL — SIGNIFICANT CHANGE UP (ref 7–23)
CALCIUM SERPL-MCNC: 9.3 MG/DL — SIGNIFICANT CHANGE UP (ref 8.4–10.5)
CHLORIDE SERPL-SCNC: 92 MMOL/L — LOW (ref 96–108)
CO2 SERPL-SCNC: 27 MMOL/L — SIGNIFICANT CHANGE UP (ref 22–31)
CREAT SERPL-MCNC: 3.69 MG/DL — HIGH (ref 0.5–1.3)
GLUCOSE BLDC GLUCOMTR-MCNC: 236 MG/DL — HIGH (ref 70–99)
GLUCOSE BLDC GLUCOMTR-MCNC: 288 MG/DL — HIGH (ref 70–99)
GLUCOSE BLDC GLUCOMTR-MCNC: 291 MG/DL — HIGH (ref 70–99)
GLUCOSE BLDC GLUCOMTR-MCNC: 346 MG/DL — HIGH (ref 70–99)
GLUCOSE BLDC GLUCOMTR-MCNC: 389 MG/DL — HIGH (ref 70–99)
GLUCOSE SERPL-MCNC: 303 MG/DL — HIGH (ref 70–99)
MAGNESIUM SERPL-MCNC: 2.1 MG/DL — SIGNIFICANT CHANGE UP (ref 1.6–2.6)
POTASSIUM SERPL-MCNC: 4.4 MMOL/L — SIGNIFICANT CHANGE UP (ref 3.5–5.3)
POTASSIUM SERPL-SCNC: 4.4 MMOL/L — SIGNIFICANT CHANGE UP (ref 3.5–5.3)
SODIUM SERPL-SCNC: 136 MMOL/L — SIGNIFICANT CHANGE UP (ref 135–145)

## 2019-08-04 PROCEDURE — ZZZZZ: CPT

## 2019-08-04 RX ORDER — BACITRACIN ZINC 500 UNIT/G
1 OINTMENT IN PACKET (EA) TOPICAL
Refills: 0 | Status: DISCONTINUED | OUTPATIENT
Start: 2019-08-04 | End: 2019-08-08

## 2019-08-04 RX ORDER — INSULIN LISPRO 100/ML
5 VIAL (ML) SUBCUTANEOUS
Refills: 0 | Status: DISCONTINUED | OUTPATIENT
Start: 2019-08-04 | End: 2019-08-08

## 2019-08-04 RX ORDER — INSULIN GLARGINE 100 [IU]/ML
7 INJECTION, SOLUTION SUBCUTANEOUS AT BEDTIME
Refills: 0 | Status: DISCONTINUED | OUTPATIENT
Start: 2019-08-04 | End: 2019-08-08

## 2019-08-04 RX ADMIN — OXYCODONE AND ACETAMINOPHEN 2 TABLET(S): 5; 325 TABLET ORAL at 02:34

## 2019-08-04 RX ADMIN — Medication 1 APPLICATION(S): at 17:22

## 2019-08-04 RX ADMIN — Medication 50 MILLIGRAM(S): at 05:01

## 2019-08-04 RX ADMIN — Medication 1 TABLET(S): at 12:51

## 2019-08-04 RX ADMIN — ATORVASTATIN CALCIUM 20 MILLIGRAM(S): 80 TABLET, FILM COATED ORAL at 21:18

## 2019-08-04 RX ADMIN — Medication 50 MILLIGRAM(S): at 18:46

## 2019-08-04 RX ADMIN — Medication 25 MILLIGRAM(S): at 12:50

## 2019-08-04 RX ADMIN — INSULIN GLARGINE 7 UNIT(S): 100 INJECTION, SOLUTION SUBCUTANEOUS at 22:01

## 2019-08-04 RX ADMIN — Medication 1: at 02:34

## 2019-08-04 RX ADMIN — Medication 4 UNIT(S): at 08:31

## 2019-08-04 RX ADMIN — Medication 2 UNIT(S): at 21:59

## 2019-08-04 RX ADMIN — Medication 4 UNIT(S): at 15:59

## 2019-08-04 RX ADMIN — PANTOPRAZOLE SODIUM 40 MILLIGRAM(S): 20 TABLET, DELAYED RELEASE ORAL at 05:01

## 2019-08-04 RX ADMIN — Medication 3 MILLILITER(S): at 18:46

## 2019-08-04 RX ADMIN — Medication 1: at 16:00

## 2019-08-04 RX ADMIN — Medication 2: at 13:38

## 2019-08-04 RX ADMIN — OXYCODONE AND ACETAMINOPHEN 2 TABLET(S): 5; 325 TABLET ORAL at 03:00

## 2019-08-04 RX ADMIN — Medication 2: at 22:00

## 2019-08-04 RX ADMIN — Medication 25 MILLIGRAM(S): at 21:18

## 2019-08-04 RX ADMIN — Medication 25 MILLIGRAM(S): at 05:01

## 2019-08-04 RX ADMIN — Medication 3 MILLILITER(S): at 12:50

## 2019-08-04 RX ADMIN — Medication 4 UNIT(S): at 13:38

## 2019-08-04 RX ADMIN — Medication 81 MILLIGRAM(S): at 12:50

## 2019-08-04 RX ADMIN — Medication 500 MILLIGRAM(S): at 05:00

## 2019-08-04 RX ADMIN — Medication 3: at 08:31

## 2019-08-04 RX ADMIN — Medication 3 MILLILITER(S): at 05:01

## 2019-08-04 RX ADMIN — CLOPIDOGREL BISULFATE 75 MILLIGRAM(S): 75 TABLET, FILM COATED ORAL at 12:50

## 2019-08-04 NOTE — PROGRESS NOTE ADULT - SUBJECTIVE AND OBJECTIVE BOX
CC: Left Nose bleed.     HPI: 63 YO F with PMH of  CAD (Cath Feb '19 showing 99% RCA, 100% RPLS, 100% Cx) s/p multiple stents/brachytherapy, ESRD (on HD M/T/T/Sa), DM1 c/b neuropathy and retinopathy, CHF, mod MR, severe AS, RHF, TIA, severe PVD s/p b/l fempop bypass, left subclavian vein stenosis s/p stent, COPD not on O2 presents with LE swelling and ct scan suggestive of CHF. ENT was consulted for evaluation of Left nose bleed. As per pt, tried to blow the nose an hour ago and started bleeding since then. Pt was on ASA 81mg, Plavix 75and SC Heparin. Denies  Trauma/ recent surgery/ NG Tube placement. Per pt, usually have episodes of Epistaxis in the past, self resolving, sometimes have to apply pressure. But never had an episode like this. Otherwise denies cough/ SOB/ Hoarseness/ Dysphagia, fever/ chills, CP/Palpitations/ Diaphoresis, HA/Dizziness/ Blurry vision/syncope, recent travel/sick contacts.. no issues overnight     PAST MEDICAL & SURGICAL HISTORY:  COPD (chronic obstructive pulmonary disease)  Localized enlarged lymph nodes  CHF (congestive heart failure): EF 40-45%  Subclavian vein stenosis, left: s/p stent  DKA, type 1: 1/2015  ACS (acute coronary syndrome): 1/2015 - cath revealed 100% ostial stenosis not amenable to PCI - medical management  TIA (transient ischemic attack): x 2 - 8-9 years ago prior to ASD/VSD repair  CAD (coronary artery disease): s/p stents  Gout: past  CVA (cerebral infarction): with no residual, 8 yrs ago, prior to heart surgery - ST memory loss  Peripheral vascular disease: occluded left fem-pop bypass 5/2015  Diabetes mellitus type 1: Insulin Dependent -  ESRD (end stage renal disease): dialysis  M, tue, thursday, saturday  Hyperlipidemia  Status post device closure of ASD: &quot;clamshell&quot;  History of cardiac catheterization: 1/2015 - no intervention  S/P femoral-popliteal bypass surgery: L and R in 2013 with graft; 5/2015 CFA angioplasty left and ileofemoral endarterectomywith vein patch angioplasty of left fem-pop bypass graft  Multiple vascular surgery both leg, left fempop bypass revision 11/2015  AV (arteriovenous fistula): Left AV graft; revision with stent placement 2-3 years ago  S/P cholecystectomy    Allergies    No Known Allergies    Intolerances      MEDICATIONS  (STANDING):  ALBUTerol/ipratropium for Nebulization 3 milliLiter(s) Nebulizer every 6 hours  aspirin enteric coated 81 milliGRAM(s) Oral daily  atorvastatin 20 milliGRAM(s) Oral at bedtime  cephalexin 500 milliGRAM(s) Oral every 12 hours  clopidogrel Tablet 75 milliGRAM(s) Oral daily  dextrose 5%. 1000 milliLiter(s) (50 mL/Hr) IV Continuous <Continuous>  dextrose 50% Injectable 12.5 Gram(s) IV Push once  dextrose 50% Injectable 25 Gram(s) IV Push once  dextrose 50% Injectable 25 Gram(s) IV Push once  docusate sodium 100 milliGRAM(s) Oral three times a day  heparin  Injectable 5000 Unit(s) SubCutaneous every 12 hours  hydrALAZINE 25 milliGRAM(s) Oral three times a day  insulin glargine Injectable (LANTUS) 6 Unit(s) SubCutaneous at bedtime  insulin lispro (HumaLOG) corrective regimen sliding scale   SubCutaneous three times a day before meals  insulin lispro (HumaLOG) corrective regimen sliding scale   SubCutaneous <User Schedule>  insulin lispro (HumaLOG) corrective regimen sliding scale   SubCutaneous at bedtime  insulin lispro Injectable (HumaLOG) 2 Unit(s) SubCutaneous at bedtime  insulin lispro Injectable (HumaLOG) 4 Unit(s) SubCutaneous three times a day before meals  metoprolol succinate ER 50 milliGRAM(s) Oral every 12 hours  multivitamin 1 Tablet(s) Oral daily  pantoprazole    Tablet 40 milliGRAM(s) Oral before breakfast    MEDICATIONS  (PRN):  acetaminophen   Tablet .. 650 milliGRAM(s) Oral every 6 hours PRN Temp greater or equal to 38.5C (101.3F), Mild Pain (1 - 3)  dextrose 40% Gel 15 Gram(s) Oral once PRN Blood Glucose LESS THAN 70 milliGRAM(s)/deciliter  glucagon  Injectable 1 milliGRAM(s) IntraMuscular once PRN Glucose LESS THAN 70 milligrams/deciliter  oxyCODONE    5 mG/acetaminophen 325 mG 2 Tablet(s) Oral every 6 hours PRN Moderate Pain (4 - 6)  senna 2 Tablet(s) Oral at bedtime PRN Constipation      social history: see consult     family history: see consult     ROS:   ENT: all negative except as noted in HPI   Pulm: denies SOB, cough, hemoptysis  Neuro: denies numbness/tingling, loss of sensation  Endo: denies heat/cold intolerance, excessive sweating      Vital Signs Last 24 Hrs  T(C): 36.5 (04 Aug 2019 04:44), Max: 37.1 (03 Aug 2019 20:23)  T(F): 97.7 (04 Aug 2019 04:44), Max: 98.8 (03 Aug 2019 20:23)  HR: 106 (04 Aug 2019 04:44) (72 - 106)  BP: 139/81 (04 Aug 2019 04:44) (104/54 - 139/81)  BP(mean): --  RR: 17 (04 Aug 2019 04:44) (17 - 18)  SpO2: 93% (04 Aug 2019 04:44) (93% - 98%)     08-04    136  |  92<L>  |  18  ----------------------------<  303<H>  4.4   |  27  |  3.69<H>    Ca    9.3      04 Aug 2019 05:23  Mg     2.1     08-04         PHYSICAL EXAM:  Gen: NAD  Skin: No rashes, bruises, or lesions  Head: Normocephalic, Atraumatic  Face: no edema, erythema, or fluctuance. Parotid glands soft without mass  Eyes: no scleral injection  Nose: left nare with dissolvale surgicel in place, no need to remove, will fall out if doesn't dissolve   Mouth: No Stridor / Drooling / Trismus.  Mucosa moist, tongue/uvula midline, oropharynx clear  Neck: Flat, supple, no lymphadenopathy, trachea midline, no masses  Lymphatic: No lymphadenopathy  Resp: breathing easily, no stridor  Neuro: facial nerve intact, no facial droop CC: Left Nose bleed.     HPI: 63 YO F with PMH of  CAD (Cath Feb '19 showing 99% RCA, 100% RPLS, 100% Cx) s/p multiple stents/brachytherapy, ESRD (on HD M/T/T/Sa), DM1 c/b neuropathy and retinopathy, CHF, mod MR, severe AS, RHF, TIA, severe PVD s/p b/l fempop bypass, left subclavian vein stenosis s/p stent, COPD not on O2 presents with LE swelling and ct scan suggestive of CHF. ENT was consulted for evaluation of Left nose bleed. As per pt, tried to blow the nose an hour ago and started bleeding since then. Pt was on ASA 81mg, Plavix 75and SC Heparin. Denies  Trauma/ recent surgery/ NG Tube placement. Per pt, usually have episodes of Epistaxis in the past, self resolving, sometimes have to apply pressure. But never had an episode like this. Otherwise denies cough/ SOB/ Hoarseness/ Dysphagia, fever/ chills, CP/Palpitations/ Diaphoresis, HA/Dizziness/ Blurry vision/syncope, recent travel/sick contacts.. no issues overnight, no active bleeding     PAST MEDICAL & SURGICAL HISTORY:  COPD (chronic obstructive pulmonary disease)  Localized enlarged lymph nodes  CHF (congestive heart failure): EF 40-45%  Subclavian vein stenosis, left: s/p stent  DKA, type 1: 1/2015  ACS (acute coronary syndrome): 1/2015 - cath revealed 100% ostial stenosis not amenable to PCI - medical management  TIA (transient ischemic attack): x 2 - 8-9 years ago prior to ASD/VSD repair  CAD (coronary artery disease): s/p stents  Gout: past  CVA (cerebral infarction): with no residual, 8 yrs ago, prior to heart surgery - ST memory loss  Peripheral vascular disease: occluded left fem-pop bypass 5/2015  Diabetes mellitus type 1: Insulin Dependent -  ESRD (end stage renal disease): dialysis  M, tue, thursday, saturday  Hyperlipidemia  Status post device closure of ASD: &quot;clamshell&quot;  History of cardiac catheterization: 1/2015 - no intervention  S/P femoral-popliteal bypass surgery: L and R in 2013 with graft; 5/2015 CFA angioplasty left and ileofemoral endarterectomywith vein patch angioplasty of left fem-pop bypass graft  Multiple vascular surgery both leg, left fempop bypass revision 11/2015  AV (arteriovenous fistula): Left AV graft; revision with stent placement 2-3 years ago  S/P cholecystectomy    Allergies    No Known Allergies    Intolerances      MEDICATIONS  (STANDING):  ALBUTerol/ipratropium for Nebulization 3 milliLiter(s) Nebulizer every 6 hours  aspirin enteric coated 81 milliGRAM(s) Oral daily  atorvastatin 20 milliGRAM(s) Oral at bedtime  cephalexin 500 milliGRAM(s) Oral every 12 hours  clopidogrel Tablet 75 milliGRAM(s) Oral daily  dextrose 5%. 1000 milliLiter(s) (50 mL/Hr) IV Continuous <Continuous>  dextrose 50% Injectable 12.5 Gram(s) IV Push once  dextrose 50% Injectable 25 Gram(s) IV Push once  dextrose 50% Injectable 25 Gram(s) IV Push once  docusate sodium 100 milliGRAM(s) Oral three times a day  heparin  Injectable 5000 Unit(s) SubCutaneous every 12 hours  hydrALAZINE 25 milliGRAM(s) Oral three times a day  insulin glargine Injectable (LANTUS) 6 Unit(s) SubCutaneous at bedtime  insulin lispro (HumaLOG) corrective regimen sliding scale   SubCutaneous three times a day before meals  insulin lispro (HumaLOG) corrective regimen sliding scale   SubCutaneous <User Schedule>  insulin lispro (HumaLOG) corrective regimen sliding scale   SubCutaneous at bedtime  insulin lispro Injectable (HumaLOG) 2 Unit(s) SubCutaneous at bedtime  insulin lispro Injectable (HumaLOG) 4 Unit(s) SubCutaneous three times a day before meals  metoprolol succinate ER 50 milliGRAM(s) Oral every 12 hours  multivitamin 1 Tablet(s) Oral daily  pantoprazole    Tablet 40 milliGRAM(s) Oral before breakfast    MEDICATIONS  (PRN):  acetaminophen   Tablet .. 650 milliGRAM(s) Oral every 6 hours PRN Temp greater or equal to 38.5C (101.3F), Mild Pain (1 - 3)  dextrose 40% Gel 15 Gram(s) Oral once PRN Blood Glucose LESS THAN 70 milliGRAM(s)/deciliter  glucagon  Injectable 1 milliGRAM(s) IntraMuscular once PRN Glucose LESS THAN 70 milligrams/deciliter  oxyCODONE    5 mG/acetaminophen 325 mG 2 Tablet(s) Oral every 6 hours PRN Moderate Pain (4 - 6)  senna 2 Tablet(s) Oral at bedtime PRN Constipation      social history: see consult     family history: see consult     ROS:   ENT: all negative except as noted in HPI   Pulm: denies SOB, cough, hemoptysis  Neuro: denies numbness/tingling, loss of sensation  Endo: denies heat/cold intolerance, excessive sweating      Vital Signs Last 24 Hrs  T(C): 36.5 (04 Aug 2019 04:44), Max: 37.1 (03 Aug 2019 20:23)  T(F): 97.7 (04 Aug 2019 04:44), Max: 98.8 (03 Aug 2019 20:23)  HR: 106 (04 Aug 2019 04:44) (72 - 106)  BP: 139/81 (04 Aug 2019 04:44) (104/54 - 139/81)  BP(mean): --  RR: 17 (04 Aug 2019 04:44) (17 - 18)  SpO2: 93% (04 Aug 2019 04:44) (93% - 98%)     08-04    136  |  92<L>  |  18  ----------------------------<  303<H>  4.4   |  27  |  3.69<H>    Ca    9.3      04 Aug 2019 05:23  Mg     2.1     08-04         PHYSICAL EXAM:  Gen: NAD  Skin: No rashes, bruises, or lesions  Head: Normocephalic, Atraumatic  Face: no edema, erythema, or fluctuance. Parotid glands soft without mass  Eyes: no scleral injection  Nose: left nare with dissolvale surgicel in place, no need to remove, will fall out if doesn't dissolve no active bleeding    Mouth: No Stridor / Drooling / Trismus.  Mucosa moist, tongue/uvula midline, oropharynx clear  Neck: Flat, supple, no lymphadenopathy, trachea midline, no masses  Lymphatic: No lymphadenopathy  Resp: breathing easily, no stridor  Neuro: facial nerve intact, no facial droop

## 2019-08-04 NOTE — PROGRESS NOTE ADULT - ASSESSMENT
64y with left with dissolvale surgicel in place, no need to remove, will fall out if doesn't dissolve 64y with left with dissolvale surgicel in place, no need to remove, will fall out if doesn't dissolve, no active bleeding

## 2019-08-04 NOTE — PROGRESS NOTE ADULT - SUBJECTIVE AND OBJECTIVE BOX
[facility]   [phone]  Dr. Urban (covering for Dr. Burger)  RENAL FOLLOW UP NOTE  --------------------------------------------------------------------------------  Patient seen and examined.  Somnolent.  Overnight events reviewed-- s/p nasal packing for epistaxis    PAST HISTORY  --------------------------------------------------------------------------------  No significant changes to PMH, PSH, FHx, SHx, unless otherwise noted    ALLERGIES & MEDICATIONS  --------------------------------------------------------------------------------  Allergies    No Known Allergies    Intolerances      Standing Inpatient Medications  ALBUTerol/ipratropium for Nebulization 3 milliLiter(s) Nebulizer every 6 hours  aspirin enteric coated 81 milliGRAM(s) Oral daily  atorvastatin 20 milliGRAM(s) Oral at bedtime  BACItracin   Ointment 1 Application(s) Topical two times a day  clopidogrel Tablet 75 milliGRAM(s) Oral daily  dextrose 5%. 1000 milliLiter(s) IV Continuous <Continuous>  dextrose 50% Injectable 12.5 Gram(s) IV Push once  dextrose 50% Injectable 25 Gram(s) IV Push once  dextrose 50% Injectable 25 Gram(s) IV Push once  docusate sodium 100 milliGRAM(s) Oral three times a day  heparin  Injectable 5000 Unit(s) SubCutaneous every 12 hours  hydrALAZINE 25 milliGRAM(s) Oral three times a day  insulin glargine Injectable (LANTUS) 6 Unit(s) SubCutaneous at bedtime  insulin lispro (HumaLOG) corrective regimen sliding scale   SubCutaneous three times a day before meals  insulin lispro (HumaLOG) corrective regimen sliding scale   SubCutaneous <User Schedule>  insulin lispro (HumaLOG) corrective regimen sliding scale   SubCutaneous at bedtime  insulin lispro Injectable (HumaLOG) 2 Unit(s) SubCutaneous at bedtime  insulin lispro Injectable (HumaLOG) 4 Unit(s) SubCutaneous three times a day before meals  metoprolol succinate ER 50 milliGRAM(s) Oral every 12 hours  multivitamin 1 Tablet(s) Oral daily  pantoprazole    Tablet 40 milliGRAM(s) Oral before breakfast    PRN Inpatient Medications  acetaminophen   Tablet .. 650 milliGRAM(s) Oral every 6 hours PRN  dextrose 40% Gel 15 Gram(s) Oral once PRN  glucagon  Injectable 1 milliGRAM(s) IntraMuscular once PRN  oxyCODONE    5 mG/acetaminophen 325 mG 2 Tablet(s) Oral every 6 hours PRN  senna 2 Tablet(s) Oral at bedtime PRN      FOCUSED REVIEW OF SYSTEMS  --------------------------------------------------------------------------------  limited 2/2 patient somnolence  denies SOB      VITALS/PHYSICAL EXAM  --------------------------------------------------------------------------------  T(C): 36.5 (08-04-19 @ 04:44), Max: 37.1 (08-03-19 @ 20:23)  HR: 106 (08-04-19 @ 04:44) (72 - 106)  BP: 139/81 (08-04-19 @ 04:44) (104/54 - 139/81)  RR: 17 (08-04-19 @ 04:44) (17 - 18)  SpO2: 93% (08-04-19 @ 04:44) (93% - 98%)  Wt(kg): --        08-03-19 @ 07:01  -  08-04-19 @ 07:00  --------------------------------------------------------  IN: 900 mL / OUT: 1700 mL / NET: -800 mL    08-04-19 @ 07:01  -  08-04-19 @ 12:11  --------------------------------------------------------  IN: 360 mL / OUT: 0 mL / NET: 360 mL      Physical Exam:  	Gen: Frail appearing  	Pulm: decreased breath sounds B/L  	CV: RRR, S1S2  	Abd: +BS, soft, nontender/nondistended  	: No suprapubic tenderness.  no irene          Extremity: No cyanosis, no edema  	Neuro: A&O to self.  somnolent but arousable to verbal stimulus      LABS/STUDIES  --------------------------------------------------------------------------------    136  |  92  |  18  ----------------------------<  303      [08-04-19 @ 05:23]  4.4   |  27  |  3.69        Ca     9.3     [08-04-19 @ 05:23]      Mg     2.1     [08-04-19 @ 05:23]            eGFR if Non African American: 12 mL/min/1.73M2 (08-04 @ 05:23)  eGFR if : 14 mL/min/1.73M2 (08-04 @ 05:23)  eGFR if Non African American: 13 mL/min/1.73M2 (08-03 @ 16:13)  eGFR if : 15 mL/min/1.73M2 (08-03 @ 16:13)    Creatinine Trend:  SCr 3.69 [08-04 @ 05:23]  SCr 3.64 [08-03 @ 16:13]  SCr 5.95 [08-02 @ 06:17]  SCr 4.63 [08-01 @ 06:00]  SCr 3.96 [07-31 @ 19:10]                  Iron 42, TIBC 253, %sat 17      [06-17-19 @ 17:54]  Ferritin 1838      [06-17-19 @ 18:06]  PTH -- (Ca 9.6)      [06-17-19 @ 18:06]   177  PTH -- (Ca 7.8)      [03-29-19 @ 20:38]   73  PTH -- (Ca 7.3)      [02-22-19 @ 02:49]   59  HbA1c 8.2      [06-16-19 @ 13:24]  TSH 0.71      [11-17-18 @ 08:25]

## 2019-08-04 NOTE — PROGRESS NOTE ADULT - ASSESSMENT
64 f with    ESRD/ fluid overload  - HD  - Nephrology follow Dr. Schmidt    DM type 1  - ADA diet  - BS control  - endocrine follow    CHF cr systolic  - continue Rx  - cardiology follow    PAT  - follow    CAD/ s/p NSTEMI  - continue Rx  - cardiology follow    COPD  - nebs    PVD  - conservative Rx    Anxiety/ Depression control    Epistaxis resolved  - ENT follow.    d/w patient  QA  Pierce Viera MD pager [phone]

## 2019-08-04 NOTE — PROGRESS NOTE ADULT - SUBJECTIVE AND OBJECTIVE BOX
CARDIOLOGY FOLLOW UP NOTE - DR. PORTILLO (for dr martinez)    Subjective:    no chest pain, sob, palpitations  events noted  ent saw pt for epistaxis     PHYSICAL EXAM:  T(C): 36.5 (19 @ 04:44), Max: 37.1 (19 @ 20:23)  HR: 106 (19 @ 04:44) (72 - 106)  BP: 139/81 (19 @ 04:44) (104/54 - 139/81)  RR: 17 (19 @ 04:44) (17 - 18)  SpO2: 93% (19 @ 04:44) (93% - 98%)  Wt(kg): --  I&O's Summary    03 Aug 2019 07:  -  04 Aug 2019 07:00  --------------------------------------------------------  IN: 900 mL / OUT: 1700 mL / NET: -800 mL    04 Aug 2019 07:01  -  04 Aug 2019 11:34  --------------------------------------------------------  IN: 360 mL / OUT: 0 mL / NET: 360 mL      Daily     Daily Weight in k.4 (04 Aug 2019 07:30)    Appearance: Normal	  Cardiovascular: Normal S1 S2,RRR, No JVD, No murmurs  Respiratory: Lungs clear to auscultation	  Gastrointestinal:  Soft, Non-tender, + BS	  Extremities: Normal range of motion, No clubbing, cyanosis or edema      Home Medications:  aspirin 81 mg oral delayed release tablet: 1 tab(s) orally once a day (2019 10:43)  atorvastatin 20 mg oral tablet: 1 tab(s) orally once a day (2019 10:43)  Basaglar KwikPen 100 units/mL subcutaneous solution: 10 units subcutaneously at bedtime (2019 10:43)  clopidogrel 75 mg oral tablet: 1 tab(s) orally once a day (2019 10:43)  hydrALAZINE 25 mg oral tablet: 4 tab(s) orally 3 times a day (2019 10:43)  insulin lispro 100 units/mL injectable solution: 10 unit(s) injectable 3 times a day (before meals) (2019 10:43)  lisinopril 40 mg oral tablet: 1 tab(s) orally once a day (2019 10:43)  metoprolol succinate 50 mg oral tablet, extended release: 1 tab(s) orally once a day (2019 10:43)  Multiple Vitamins oral tablet: 1 tab(s) orally once a day (2019 10:43)  pantoprazole 40 mg oral delayed release tablet: 1 tab(s) orally once a day (2019 10:43)  Percocet 5/325 oral tablet: 1 tab(s) orally 2 times a day (2019 10:43)  sevelamer carbonate 800 mg oral tablet: 2 tab(s) orally 3 times a day (with meals) (2019 10:43)      MEDICATIONS  (STANDING):  ALBUTerol/ipratropium for Nebulization 3 milliLiter(s) Nebulizer every 6 hours  aspirin enteric coated 81 milliGRAM(s) Oral daily  atorvastatin 20 milliGRAM(s) Oral at bedtime  clopidogrel Tablet 75 milliGRAM(s) Oral daily  dextrose 5%. 1000 milliLiter(s) (50 mL/Hr) IV Continuous <Continuous>  dextrose 50% Injectable 12.5 Gram(s) IV Push once  dextrose 50% Injectable 25 Gram(s) IV Push once  dextrose 50% Injectable 25 Gram(s) IV Push once  docusate sodium 100 milliGRAM(s) Oral three times a day  heparin  Injectable 5000 Unit(s) SubCutaneous every 12 hours  hydrALAZINE 25 milliGRAM(s) Oral three times a day  insulin glargine Injectable (LANTUS) 6 Unit(s) SubCutaneous at bedtime  insulin lispro (HumaLOG) corrective regimen sliding scale   SubCutaneous three times a day before meals  insulin lispro (HumaLOG) corrective regimen sliding scale   SubCutaneous <User Schedule>  insulin lispro (HumaLOG) corrective regimen sliding scale   SubCutaneous at bedtime  insulin lispro Injectable (HumaLOG) 2 Unit(s) SubCutaneous at bedtime  insulin lispro Injectable (HumaLOG) 4 Unit(s) SubCutaneous three times a day before meals  metoprolol succinate ER 50 milliGRAM(s) Oral every 12 hours  multivitamin 1 Tablet(s) Oral daily  pantoprazole    Tablet 40 milliGRAM(s) Oral before breakfast      TELEMETRY: 	    ECG:  	  RADIOLOGY:   DIAGNOSTIC TESTING:  [ ] Echocardiogram:  [ ] Catheterization:  [ ] Stress Test:    OTHER: 	    LABS:	 	    CARDIAC MARKERS:                136  |  92<L>  |  18  ----------------------------<  303<H>  4.4   |  27  |  3.69<H>    Ca    9.3      04 Aug 2019 05:23  Mg     2.1           proBNP:     Lipid Profile:   HgA1c:     Creatinine, Serum: 3.69 mg/dL (19 @ 05:23)  Creatinine, Serum: 3.64 mg/dL (19 @ 16:13)  Creatinine, Serum: 5.95 mg/dL (19 @ 06:17)

## 2019-08-04 NOTE — PROGRESS NOTE ADULT - SUBJECTIVE AND OBJECTIVE BOX
Patient is a 64y old  Female who presents with a chief complaint of sob (04 Aug 2019 11:34)      SUBJECTIVE / OVERNIGHT EVENTS: No new complaints. Weak. Had epistaxis resolved.  Review of Systems  chest pain no  palpitations no  sob no  nausea no  headache no    MEDICATIONS  (STANDING):  ALBUTerol/ipratropium for Nebulization 3 milliLiter(s) Nebulizer every 6 hours  aspirin enteric coated 81 milliGRAM(s) Oral daily  atorvastatin 20 milliGRAM(s) Oral at bedtime  clopidogrel Tablet 75 milliGRAM(s) Oral daily  dextrose 5%. 1000 milliLiter(s) (50 mL/Hr) IV Continuous <Continuous>  dextrose 50% Injectable 12.5 Gram(s) IV Push once  dextrose 50% Injectable 25 Gram(s) IV Push once  dextrose 50% Injectable 25 Gram(s) IV Push once  docusate sodium 100 milliGRAM(s) Oral three times a day  heparin  Injectable 5000 Unit(s) SubCutaneous every 12 hours  hydrALAZINE 25 milliGRAM(s) Oral three times a day  insulin glargine Injectable (LANTUS) 6 Unit(s) SubCutaneous at bedtime  insulin lispro (HumaLOG) corrective regimen sliding scale   SubCutaneous three times a day before meals  insulin lispro (HumaLOG) corrective regimen sliding scale   SubCutaneous <User Schedule>  insulin lispro (HumaLOG) corrective regimen sliding scale   SubCutaneous at bedtime  insulin lispro Injectable (HumaLOG) 2 Unit(s) SubCutaneous at bedtime  insulin lispro Injectable (HumaLOG) 4 Unit(s) SubCutaneous three times a day before meals  metoprolol succinate ER 50 milliGRAM(s) Oral every 12 hours  multivitamin 1 Tablet(s) Oral daily  pantoprazole    Tablet 40 milliGRAM(s) Oral before breakfast    MEDICATIONS  (PRN):  acetaminophen   Tablet .. 650 milliGRAM(s) Oral every 6 hours PRN Temp greater or equal to 38.5C (101.3F), Mild Pain (1 - 3)  dextrose 40% Gel 15 Gram(s) Oral once PRN Blood Glucose LESS THAN 70 milliGRAM(s)/deciliter  glucagon  Injectable 1 milliGRAM(s) IntraMuscular once PRN Glucose LESS THAN 70 milligrams/deciliter  oxyCODONE    5 mG/acetaminophen 325 mG 2 Tablet(s) Oral every 6 hours PRN Moderate Pain (4 - 6)  senna 2 Tablet(s) Oral at bedtime PRN Constipation      Vital Signs Last 24 Hrs  T(C): 36.5 (04 Aug 2019 04:44), Max: 37.1 (03 Aug 2019 20:23)  T(F): 97.7 (04 Aug 2019 04:44), Max: 98.8 (03 Aug 2019 20:23)  HR: 106 (04 Aug 2019 04:44) (72 - 106)  BP: 139/81 (04 Aug 2019 04:44) (104/54 - 139/81)  BP(mean): --  RR: 17 (04 Aug 2019 04:44) (17 - 18)  SpO2: 93% (04 Aug 2019 04:44) (93% - 98%)    PHYSICAL EXAM:  GENERAL: NAD  HEAD:  Atraumatic, Normocephalic  EYES: EOMI, PERRLA, conjunctiva and sclera clear  NECK: Supple, No JVD  CHEST/LUNG: Clear to auscultation bilaterally; No wheeze  HEART: Regular rate and rhythm; No murmurs, rubs, or gallops  ABDOMEN: Soft, Nontender, Nondistended; Bowel sounds present  EXTREMITIES:  2+ Peripheral Pulses, No clubbing, cyanosis, or edema  PSYCH: AAOx3  NEUROLOGY: non-focal  SKIN: No rashes or lesions    LABS:    08-04    136  |  92<L>  |  18  ----------------------------<  303<H>  4.4   |  27  |  3.69<H>    Ca    9.3      04 Aug 2019 05:23  Mg     2.1     08-04                  RADIOLOGY & ADDITIONAL TESTS:    Imaging Personally Reviewed:    Consultant(s) Notes Reviewed:      Care Discussed with Consultants/Other Providers:

## 2019-08-04 NOTE — PROGRESS NOTE ADULT - ASSESSMENT
Assessment:  -chest pain with mild ekg changes and stable hs trop  -CHF exacerbation  -NSVT  -pAT  -volume overload  -ischemic cardiomyopathy, cad s/p multiple stents  -esrd on hd  -PAD s/p prior intervention   -epistaxis       Recs:  -resolved chest pain/angina   -known extensive CAD and ICM. s/p [facility] 2/20/2019 --> severe and diffuse instent restenosis along RCA --> unable to stent due to multiple layers of stenting and prior brachytherapy  -will consider complex intervention if true ischemic and refractory sx develop   -c/w beta blockers for nsvt/pat/cad (as above).c/w  toprol 50mg bid, cont nitrates  -hx of prolonged qtc. avoid qtc prolonging meds  -s/p TTE 2019: mod-severe MR, pseudo AS (low flow-low gradient), mod-severe LV dysfunction --> recent CTS consult with dr hebert appreciated. plan is for medical management given surgical risk and patient preference  -c/w asa, plavix, statin for ischemic cardiomyopathy/CAD/PAD  -dvt ppx  -ent f/u

## 2019-08-04 NOTE — PROGRESS NOTE ADULT - PROBLEM SELECTOR PLAN 1
- d/c abx (no need with surgicel packing, being that it is dissolvable or will fall out)   - no need for removal, length trimmed today 2/2 it partially falling out.   - continue with baci to left nare and ocean nasal spray once surgicel out  - call ent prn

## 2019-08-04 NOTE — PROGRESS NOTE ADULT - PROBLEM SELECTOR PLAN 1
repeat HD yesterday for additional ultrafiltration in the setting of ongoing volume overload/CHF  reassess for next HD tomorrow  holding lisinopril in setting of hypotension and hyperkalemia-- hyperkalemia resolved s/p HD x 2  dose meds for eGFR<15

## 2019-08-05 LAB
ANION GAP SERPL CALC-SCNC: 21 MMOL/L — HIGH (ref 5–17)
BUN SERPL-MCNC: 36 MG/DL — HIGH (ref 7–23)
CALCIUM SERPL-MCNC: 9.6 MG/DL — SIGNIFICANT CHANGE UP (ref 8.4–10.5)
CHLORIDE SERPL-SCNC: 91 MMOL/L — LOW (ref 96–108)
CO2 SERPL-SCNC: 25 MMOL/L — SIGNIFICANT CHANGE UP (ref 22–31)
CREAT SERPL-MCNC: 5.1 MG/DL — HIGH (ref 0.5–1.3)
GLUCOSE BLDC GLUCOMTR-MCNC: 130 MG/DL — HIGH (ref 70–99)
GLUCOSE BLDC GLUCOMTR-MCNC: 155 MG/DL — HIGH (ref 70–99)
GLUCOSE BLDC GLUCOMTR-MCNC: 181 MG/DL — HIGH (ref 70–99)
GLUCOSE BLDC GLUCOMTR-MCNC: 232 MG/DL — HIGH (ref 70–99)
GLUCOSE BLDC GLUCOMTR-MCNC: 407 MG/DL — HIGH (ref 70–99)
GLUCOSE SERPL-MCNC: 247 MG/DL — HIGH (ref 70–99)
HCT VFR BLD CALC: 30.1 % — LOW (ref 34.5–45)
HGB BLD-MCNC: 9.5 G/DL — LOW (ref 11.5–15.5)
MCHC RBC-ENTMCNC: 31.6 GM/DL — LOW (ref 32–36)
MCHC RBC-ENTMCNC: 34.2 PG — HIGH (ref 27–34)
MCV RBC AUTO: 108.3 FL — HIGH (ref 80–100)
PLATELET # BLD AUTO: 180 K/UL — SIGNIFICANT CHANGE UP (ref 150–400)
POTASSIUM SERPL-MCNC: 4.5 MMOL/L — SIGNIFICANT CHANGE UP (ref 3.5–5.3)
POTASSIUM SERPL-SCNC: 4.5 MMOL/L — SIGNIFICANT CHANGE UP (ref 3.5–5.3)
RBC # BLD: 2.78 M/UL — LOW (ref 3.8–5.2)
RBC # FLD: 13.3 % — SIGNIFICANT CHANGE UP (ref 10.3–14.5)
SODIUM SERPL-SCNC: 137 MMOL/L — SIGNIFICANT CHANGE UP (ref 135–145)
WBC # BLD: 5.17 K/UL — SIGNIFICANT CHANGE UP (ref 3.8–10.5)
WBC # FLD AUTO: 5.17 K/UL — SIGNIFICANT CHANGE UP (ref 3.8–10.5)

## 2019-08-05 PROCEDURE — 99232 SBSQ HOSP IP/OBS MODERATE 35: CPT

## 2019-08-05 RX ORDER — INSULIN LISPRO 100/ML
6 VIAL (ML) SUBCUTANEOUS ONCE
Refills: 0 | Status: COMPLETED | OUTPATIENT
Start: 2019-08-05 | End: 2019-08-05

## 2019-08-05 RX ORDER — INSULIN LISPRO 100/ML
VIAL (ML) SUBCUTANEOUS
Refills: 0 | Status: DISCONTINUED | OUTPATIENT
Start: 2019-08-06 | End: 2019-08-08

## 2019-08-05 RX ORDER — INSULIN LISPRO 100/ML
4 VIAL (ML) SUBCUTANEOUS AT BEDTIME
Refills: 0 | Status: DISCONTINUED | OUTPATIENT
Start: 2019-08-05 | End: 2019-08-08

## 2019-08-05 RX ORDER — OXYCODONE AND ACETAMINOPHEN 5; 325 MG/1; MG/1
2 TABLET ORAL EVERY 6 HOURS
Refills: 0 | Status: DISCONTINUED | OUTPATIENT
Start: 2019-08-06 | End: 2019-08-08

## 2019-08-05 RX ADMIN — Medication 1 TABLET(S): at 13:19

## 2019-08-05 RX ADMIN — Medication 5 UNIT(S): at 13:19

## 2019-08-05 RX ADMIN — OXYCODONE AND ACETAMINOPHEN 2 TABLET(S): 5; 325 TABLET ORAL at 13:03

## 2019-08-05 RX ADMIN — Medication 5 UNIT(S): at 09:25

## 2019-08-05 RX ADMIN — Medication 3 MILLILITER(S): at 05:16

## 2019-08-05 RX ADMIN — PANTOPRAZOLE SODIUM 40 MILLIGRAM(S): 20 TABLET, DELAYED RELEASE ORAL at 05:16

## 2019-08-05 RX ADMIN — Medication 3 MILLILITER(S): at 00:15

## 2019-08-05 RX ADMIN — Medication 100 MILLIGRAM(S): at 21:08

## 2019-08-05 RX ADMIN — ATORVASTATIN CALCIUM 20 MILLIGRAM(S): 80 TABLET, FILM COATED ORAL at 21:08

## 2019-08-05 RX ADMIN — Medication 1 APPLICATION(S): at 05:17

## 2019-08-05 RX ADMIN — Medication 5 UNIT(S): at 19:08

## 2019-08-05 RX ADMIN — Medication 25 MILLIGRAM(S): at 05:16

## 2019-08-05 RX ADMIN — OXYCODONE AND ACETAMINOPHEN 2 TABLET(S): 5; 325 TABLET ORAL at 17:16

## 2019-08-05 RX ADMIN — Medication 6 UNIT(S): at 02:52

## 2019-08-05 RX ADMIN — Medication 25 MILLIGRAM(S): at 21:08

## 2019-08-05 RX ADMIN — Medication 81 MILLIGRAM(S): at 13:19

## 2019-08-05 RX ADMIN — Medication 3: at 02:52

## 2019-08-05 RX ADMIN — Medication 1 APPLICATION(S): at 19:08

## 2019-08-05 RX ADMIN — Medication 1: at 19:07

## 2019-08-05 RX ADMIN — Medication 1: at 09:25

## 2019-08-05 RX ADMIN — Medication 3 MILLILITER(S): at 19:08

## 2019-08-05 RX ADMIN — INSULIN GLARGINE 7 UNIT(S): 100 INJECTION, SOLUTION SUBCUTANEOUS at 21:58

## 2019-08-05 RX ADMIN — CLOPIDOGREL BISULFATE 75 MILLIGRAM(S): 75 TABLET, FILM COATED ORAL at 13:19

## 2019-08-05 RX ADMIN — Medication 50 MILLIGRAM(S): at 05:16

## 2019-08-05 RX ADMIN — Medication 4 UNIT(S): at 21:59

## 2019-08-05 RX ADMIN — Medication 50 MILLIGRAM(S): at 21:08

## 2019-08-05 NOTE — PROGRESS NOTE ADULT - SUBJECTIVE AND OBJECTIVE BOX
[facility]   [phone]  DIALYSIS NOTE  Chief Complaint: ESRD/Ongoing hemodialysis requirement. seen on hd    24 hour events/subjective:      still overall c.o fatigue       ALLERGIES & MEDICATIONS  --------------------------------------------------------------------------------  Allergies    No Known Allergies    Intolerances      Standing Inpatient Medications  ALBUTerol/ipratropium for Nebulization 3 milliLiter(s) Nebulizer every 6 hours  aspirin enteric coated 81 milliGRAM(s) Oral daily  atorvastatin 20 milliGRAM(s) Oral at bedtime  BACItracin   Ointment 1 Application(s) Topical two times a day  clopidogrel Tablet 75 milliGRAM(s) Oral daily  dextrose 5%. 1000 milliLiter(s) IV Continuous <Continuous>  dextrose 50% Injectable 12.5 Gram(s) IV Push once  dextrose 50% Injectable 25 Gram(s) IV Push once  dextrose 50% Injectable 25 Gram(s) IV Push once  docusate sodium 100 milliGRAM(s) Oral three times a day  heparin  Injectable 5000 Unit(s) SubCutaneous every 12 hours  hydrALAZINE 25 milliGRAM(s) Oral three times a day  insulin glargine Injectable (LANTUS) 7 Unit(s) SubCutaneous at bedtime  insulin lispro (HumaLOG) corrective regimen sliding scale   SubCutaneous three times a day before meals  insulin lispro (HumaLOG) corrective regimen sliding scale   SubCutaneous at bedtime  insulin lispro Injectable (HumaLOG) 5 Unit(s) SubCutaneous three times a day before meals  insulin lispro Injectable (HumaLOG) 4 Unit(s) SubCutaneous at bedtime  metoprolol succinate ER 50 milliGRAM(s) Oral every 12 hours  multivitamin 1 Tablet(s) Oral daily  pantoprazole    Tablet 40 milliGRAM(s) Oral before breakfast    PRN Inpatient Medications  acetaminophen   Tablet .. 650 milliGRAM(s) Oral every 6 hours PRN  dextrose 40% Gel 15 Gram(s) Oral once PRN  glucagon  Injectable 1 milliGRAM(s) IntraMuscular once PRN  oxyCODONE    5 mG/acetaminophen 325 mG 2 Tablet(s) Oral every 6 hours PRN  senna 2 Tablet(s) Oral at bedtime PRN      REVIEW OF SYSTEMS  --------------------------------------------------------------------------------  no itching or rash  no fever or chill  no cp or palp   no sob or cough   no N/V/D/ no abd pain   ext  + edema         VITALS/PHYSICAL EXAM  --------------------------------------------------------------------------------  T(C): 36.3 (08-05-19 @ 20:35), Max: 36.6 (08-05-19 @ 04:51)  HR: 88 (08-05-19 @ 20:35) (76 - 88)  BP: 123/72 (08-05-19 @ 20:35) (112/- - 142/56)  RR: 18 (08-05-19 @ 20:35) (16 - 18)  SpO2: 100% (08-05-19 @ 20:35) (94% - 100%)  Wt(kg): --        08-04-19 @ 07:01  -  08-05-19 @ 07:00  --------------------------------------------------------  IN: 600 mL / OUT: 0 mL / NET: 600 mL    08-05-19 @ 07:01  -  08-05-19 @ 22:33  --------------------------------------------------------  IN: 1860 mL / OUT: 3200 mL / NET: -1340 mL      Physical Exam:  		    	Gen: alert oriented place person and date   	Pulm: Decreased breath sounds b/l bases no rales or ronchi  	CV: RRR, S1/S2  	Abd: +BS, soft, nontender/nondistended  	Extremity: No cyanosis, 2-3-   edema no clubbing  	    LABS/STUDIES  --------------------------------------------------------------------------------              9.5    5.17  >-----------<  180      [08-05-19 @ 08:02]              30.1     137  |  91  |  36  ----------------------------<  247      [08-05-19 @ 05:48]  4.5   |  25  |  5.10        Ca     9.6     [08-05-19 @ 05:48]      Mg     2.1     [08-04-19 @ 05:23]

## 2019-08-05 NOTE — PROGRESS NOTE ADULT - ASSESSMENT
64 Female hx CAD (Cath Feb '19 showing 99% RCA, 100% RPLS, 100% Cx) s/p multiple stents/brachytherapy, ESRD (on HD M/T/T/Sa), DM1 c/b neuropathy and retinopathy, CHF, mod MR, severe AS, RHF, TIA, severe PVD s/p b/l fempop bypass, left subclavian vein stenosis s/p stent, COPD not on O2 pw cc of LE swelling and ct scan suggestive of CHF. also with hyperkalemia and thrombophlebitis     1- esrd  2- hyperkalemia improved   3- pulm edema  4- htn       HD F 1 60 3.5 hours.  2K bath Blood flow rate 400 Dialysate  Flow rate 600 2.3 liter   see hd flow sheet

## 2019-08-05 NOTE — PROGRESS NOTE ADULT - PROBLEM SELECTOR PLAN 1
-test BG AC/HS/2AM  -c/w Lantus 7 units QHS  -c/w Humalog 5 units AC meals  -Increase Humalog 4 units w/HS snack  -c/w Humalog adjusted low correction scale AC and Low HS/2AM scale (1-4 units >250mg/dl).   -discussed plan w/pt and team  pager: [phone]

## 2019-08-05 NOTE — PROGRESS NOTE ADULT - SUBJECTIVE AND OBJECTIVE BOX
Cardiovascular Disease Progress Note    Overnight events: No acute events overnight.  no further episodes of chest pain. denies any sob on supplemental o2. + edema. no palps/dizziness/syncope  Otherwise review of systems negative    Objective Findings:  T(C): 36.6 (08-05-19 @ 04:51), Max: 36.6 (08-05-19 @ 04:51)  HR: 84 (08-05-19 @ 04:51) (75 - 109)  BP: 142/56 (08-05-19 @ 04:51) (136/75 - 155/80)  RR: 17 (08-05-19 @ 04:51) (16 - 17)  SpO2: 94% (08-05-19 @ 04:51) (94% - 98%)  Wt(kg): --  Daily     Daily       Physical Exam:  Gen: NAD  HEENT: EOMI  CV: RRR, normal S1 + S2, no m/r/g  Lungs: CTAB  Abd: soft, non-tender  Ext: No edema    Telemetry: nst, accelerated junctional    Laboratory Data:    08-05    137  |  91<L>  |  36<H>  ----------------------------<  247<H>  4.5   |  25  |  5.10<H>    Ca    9.6      05 Aug 2019 05:48  Mg     2.1     08-04                Inpatient Medications:  MEDICATIONS  (STANDING):  ALBUTerol/ipratropium for Nebulization 3 milliLiter(s) Nebulizer every 6 hours  aspirin enteric coated 81 milliGRAM(s) Oral daily  atorvastatin 20 milliGRAM(s) Oral at bedtime  BACItracin   Ointment 1 Application(s) Topical two times a day  clopidogrel Tablet 75 milliGRAM(s) Oral daily  dextrose 5%. 1000 milliLiter(s) (50 mL/Hr) IV Continuous <Continuous>  dextrose 50% Injectable 12.5 Gram(s) IV Push once  dextrose 50% Injectable 25 Gram(s) IV Push once  dextrose 50% Injectable 25 Gram(s) IV Push once  docusate sodium 100 milliGRAM(s) Oral three times a day  heparin  Injectable 5000 Unit(s) SubCutaneous every 12 hours  hydrALAZINE 25 milliGRAM(s) Oral three times a day  insulin glargine Injectable (LANTUS) 7 Unit(s) SubCutaneous at bedtime  insulin lispro (HumaLOG) corrective regimen sliding scale   SubCutaneous three times a day before meals  insulin lispro (HumaLOG) corrective regimen sliding scale   SubCutaneous <User Schedule>  insulin lispro (HumaLOG) corrective regimen sliding scale   SubCutaneous at bedtime  insulin lispro Injectable (HumaLOG) 2 Unit(s) SubCutaneous at bedtime  insulin lispro Injectable (HumaLOG) 5 Unit(s) SubCutaneous three times a day before meals  metoprolol succinate ER 50 milliGRAM(s) Oral every 12 hours  multivitamin 1 Tablet(s) Oral daily  pantoprazole    Tablet 40 milliGRAM(s) Oral before breakfast      Assessment:  -chest pain with mild ekg changes and stable hs trop  -CHF exacerbation  -NSVT  -pAT  -volume overload  -ischemic cardiomyopathy, cad s/p multiple stents  -esrd on hd  -PAD s/p prior intervention         Recs:  -resolved chest pain/angina   -known extensive CAD and ICM. s/p [facility] 2/20/2019 --> severe and diffuse instent restenosis along RCA --> unable to stent due to multiple layers of stenting and prior brachytherapy  -will consider complex intervention if true ischemic and refractory sx develop   -c/w beta blockers for nsvt/pat/cad (as above).c/w  toprol 50mg bid, cont nitrates as tolerates  -hx of prolonged qtc. avoid qtc prolonging meds  -s/p TTE 2019: mod-severe MR, pseudo AS (low flow-low gradient), mod-severe LV dysfunction --> recent CTS consult with dr hebert appreciated. plan is for medical management given surgical risk and patient preference  -c/w asa, plavix, statin for ischemic cardiomyopathy/CAD/PAD  -dvt ppx  -ent f/u         Over 25 minutes spent on total encounter; more than 50% of the visit was spent counseling and/or coordinating care by the attending physician.      Julián Biswas MD   Cardiovascular Disease  [phone]

## 2019-08-05 NOTE — PROGRESS NOTE ADULT - SUBJECTIVE AND OBJECTIVE BOX
Diabetes Follow up note:  Interval Hx:   65y/o M w/h/o uncontrolled TIDM (HbA1c 8.2% 6/19) c/b neuropathy and retinopathy as well as CAD s/p multiple stents, CHF (EF 50% 10/2018), TIA, PVD s/p b/l fem-pop bypass, ESRD> HD M/T/Th/Sat, presenting with worsening SOB and LE edema.       Review of Systems:  General:  GI: Tolerating POs without any N/V/D/ABD PAIN.  CV: No CP/SOB  ENDO: No S&Sx of hypoglycemia  MEDS:  atorvastatin 20 milliGRAM(s) Oral at bedtime    insulin glargine Injectable (LANTUS) 7 Unit(s) SubCutaneous at bedtime  insulin lispro (HumaLOG) corrective regimen sliding scale   SubCutaneous three times a day before meals  insulin lispro (HumaLOG) corrective regimen sliding scale   SubCutaneous <User Schedule>  insulin lispro (HumaLOG) corrective regimen sliding scale   SubCutaneous at bedtime  insulin lispro Injectable (HumaLOG) 2 Unit(s) SubCutaneous at bedtime  insulin lispro Injectable (HumaLOG) 5 Unit(s) SubCutaneous three times a day before meals      Allergies    No Known Allergies        PE:  General:  Vital Signs Last 24 Hrs  T(C): 36.6 (05 Aug 2019 04:51), Max: 36.6 (05 Aug 2019 04:51)  T(F): 97.9 (05 Aug 2019 04:51), Max: 97.9 (05 Aug 2019 04:51)  HR: 84 (05 Aug 2019 04:51) (75 - 109)  BP: 142/56 (05 Aug 2019 04:51) (136/75 - 155/80)  BP(mean): --  RR: 17 (05 Aug 2019 04:51) (16 - 17)  SpO2: 94% (05 Aug 2019 04:51) (94% - 98%)  Abd: Soft, NT,ND,   Extremities: Warm  Neuro: A&O X3    LABS:    POCT Blood Glucose.: 181 mg/dL (08-05-19 @ 09:16)  POCT Blood Glucose.: 407 mg/dL (08-05-19 @ 02:39)  POCT Blood Glucose.: 346 mg/dL (08-04-19 @ 21:53)  POCT Blood Glucose.: 236 mg/dL (08-04-19 @ 15:54)  POCT Blood Glucose.: 288 mg/dL (08-04-19 @ 13:30)  POCT Blood Glucose.: 389 mg/dL (08-04-19 @ 08:27)  POCT Blood Glucose.: 291 mg/dL (08-04-19 @ 02:14)  POCT Blood Glucose.: 233 mg/dL (08-03-19 @ 21:31)  POCT Blood Glucose.: 246 mg/dL (08-03-19 @ 18:26)  POCT Blood Glucose.: 322 mg/dL (08-03-19 @ 13:22)  POCT Blood Glucose.: 373 mg/dL (08-03-19 @ 08:18)  POCT Blood Glucose.: 327 mg/dL (08-03-19 @ 01:42)  POCT Blood Glucose.: 160 mg/dL (08-02-19 @ 21:46)  POCT Blood Glucose.: 297 mg/dL (08-02-19 @ 17:41)  POCT Blood Glucose.: 230 mg/dL (08-02-19 @ 11:55)                            9.5    5.17  )-----------( 180      ( 05 Aug 2019 08:02 )             30.1       08-05    137  |  91<L>  |  36<H>  ----------------------------<  247<H>  4.5   |  25  |  5.10<H>    Ca    9.6      05 Aug 2019 05:48  Mg     2.1     08-04        Thyroid Function Tests:      Hemoglobin A1C, Whole Blood: 8.2 % <H> [4.0 - 5.6] (06-16-19 @ 13:24)            Contact number: isabel [phone] or [phone] Diabetes Follow up note:  Interval Hx:   65y/o M w/h/o uncontrolled TIDM (HbA1c 8.2% 6/19) c/b neuropathy and retinopathy as well as CAD s/p multiple stents, CHF (EF 50% 10/2018), TIA, PVD s/p b/l fem-pop bypass, ESRD> HD M/T/Th/Sat, presenting with worsening SOB and LE edema. Pt w/episode of severe hyperglycemia in 400s when tested overnight. Pt seen at bedside, standing in room. Pt reports "I'm eating all the time" and my appetite is "very good". Pt reports eating sheila crackers and saltines at bedtime. Hoping to be discharged soon, reports feeling better but still w/edema in b/l extremities.       Review of Systems:  General: "they need to give me more insulin"  GI: Tolerating POs without any N/V/D/ABD PAIN.  CV: No CP/SOB  ENDO: No S&Sx of hypoglycemia  MEDS:  atorvastatin 20 milliGRAM(s) Oral at bedtime    insulin glargine Injectable (LANTUS) 7 Unit(s) SubCutaneous at bedtime  insulin lispro (HumaLOG) corrective regimen sliding scale   SubCutaneous three times a day before meals  insulin lispro (HumaLOG) corrective regimen sliding scale   SubCutaneous <User Schedule>  insulin lispro (HumaLOG) corrective regimen sliding scale   SubCutaneous at bedtime  insulin lispro Injectable (HumaLOG) 2 Unit(s) SubCutaneous at bedtime  insulin lispro Injectable (HumaLOG) 5 Unit(s) SubCutaneous three times a day before meals      Allergies    No Known Allergies        PE:  General: Female standing in room. NAD.   Vital Signs Last 24 Hrs  T(C): 36.6 (05 Aug 2019 04:51), Max: 36.6 (05 Aug 2019 04:51)  T(F): 97.9 (05 Aug 2019 04:51), Max: 97.9 (05 Aug 2019 04:51)  HR: 84 (05 Aug 2019 04:51) (75 - 109)  BP: 142/56 (05 Aug 2019 04:51) (136/75 - 155/80)  BP(mean): --  RR: 17 (05 Aug 2019 04:51) (16 - 17)  SpO2: 94% (05 Aug 2019 04:51) (94% - 98%)  Abd: Soft, NT,ND,   Extremities: Warm. B/L LE edema  Neuro: A&O X3    LABS:    POCT Blood Glucose.: 181 mg/dL (08-05-19 @ 09:16)  POCT Blood Glucose.: 407 mg/dL (08-05-19 @ 02:39)  POCT Blood Glucose.: 346 mg/dL (08-04-19 @ 21:53)  POCT Blood Glucose.: 236 mg/dL (08-04-19 @ 15:54)  POCT Blood Glucose.: 288 mg/dL (08-04-19 @ 13:30)  POCT Blood Glucose.: 389 mg/dL (08-04-19 @ 08:27)  POCT Blood Glucose.: 291 mg/dL (08-04-19 @ 02:14)  POCT Blood Glucose.: 233 mg/dL (08-03-19 @ 21:31)  POCT Blood Glucose.: 246 mg/dL (08-03-19 @ 18:26)  POCT Blood Glucose.: 322 mg/dL (08-03-19 @ 13:22)  POCT Blood Glucose.: 373 mg/dL (08-03-19 @ 08:18)  POCT Blood Glucose.: 327 mg/dL (08-03-19 @ 01:42)  POCT Blood Glucose.: 160 mg/dL (08-02-19 @ 21:46)  POCT Blood Glucose.: 297 mg/dL (08-02-19 @ 17:41)  POCT Blood Glucose.: 230 mg/dL (08-02-19 @ 11:55)                            9.5    5.17  )-----------( 180      ( 05 Aug 2019 08:02 )             30.1       08-05    137  |  91<L>  |  36<H>  ----------------------------<  247<H>  4.5   |  25  |  5.10<H>    Ca    9.6      05 Aug 2019 05:48  Mg     2.1     08-04          Hemoglobin A1C, Whole Blood: 8.2 % <H> [4.0 - 5.6] (06-16-19 @ 13:24)            Contact number: isabel [phone] or [phone]

## 2019-08-05 NOTE — PROGRESS NOTE ADULT - ASSESSMENT
64 f with    ESRD/ fluid overload  - HD  - Nephrology follow Dr. Schmidt    DM type 1  - ADA diet  - BS control  - endocrine follow    CHF cr systolic  - continue Rx  - cardiology follow    PAT  - follow    CAD/ s/p NSTEMI  - continue Rx  - cardiology follow    COPD  - nebs    PVD  - conservative Rx    Anxiety/ Depression control    Epistaxis resolved  - ENT follow.    d/w patient  QA  Pierce Viera MD pager [phone]

## 2019-08-05 NOTE — CHART NOTE - NSCHARTNOTEFT_GEN_A_CORE
NATHAN MEEKS    Notified by RN patient with        Interventions taken   additional Insulin 6 units SQ x1  repeat FS in 15min  cont assess and monitor.  f/u with am team.                      Washington Shabazz ANP-BC  Spectralink #[id number]

## 2019-08-05 NOTE — PROGRESS NOTE ADULT - ASSESSMENT
65 y/o M w/h/o uncontrolled TIDM (HbA1c 8.2% 6/19) c/b neuropathy and retinopathy as well as CAD s/p multiple stents, CHF (EF 50% 10/2018), TIA, PVD s/p b/l fem-pop bypass, ESRD> HD M/T/Th/Sat, presenting with worsening SOB and LE edema. Found to have CHF exacerbation/fluid retention/hyperkalemia and thrombophlebitis. On HD with persistent edema in LEs. Pt w/dietary indiscretions making achieving glycemic control difficult. Pt well known to service, does not want to follow cons carb diet and asking for outside food in addition to snacking between and after meals. Will adjust HS humalog given pattern of hyperglycemia overnight. BG goal (100-180mg/dl).

## 2019-08-05 NOTE — PROGRESS NOTE ADULT - SUBJECTIVE AND OBJECTIVE BOX
Patient is a 64y old  Female who presents with a chief complaint of sob (05 Aug 2019 10:46)      SUBJECTIVE / OVERNIGHT EVENTS: Comfortable without new complaints. Weak.  Review of Systems  chest pain no  palpitations no  sob no  nausea no  headache no    MEDICATIONS  (STANDING):  ALBUTerol/ipratropium for Nebulization 3 milliLiter(s) Nebulizer every 6 hours  aspirin enteric coated 81 milliGRAM(s) Oral daily  atorvastatin 20 milliGRAM(s) Oral at bedtime  BACItracin   Ointment 1 Application(s) Topical two times a day  clopidogrel Tablet 75 milliGRAM(s) Oral daily  dextrose 5%. 1000 milliLiter(s) (50 mL/Hr) IV Continuous <Continuous>  dextrose 50% Injectable 12.5 Gram(s) IV Push once  dextrose 50% Injectable 25 Gram(s) IV Push once  dextrose 50% Injectable 25 Gram(s) IV Push once  docusate sodium 100 milliGRAM(s) Oral three times a day  heparin  Injectable 5000 Unit(s) SubCutaneous every 12 hours  hydrALAZINE 25 milliGRAM(s) Oral three times a day  insulin glargine Injectable (LANTUS) 7 Unit(s) SubCutaneous at bedtime  insulin lispro (HumaLOG) corrective regimen sliding scale   SubCutaneous three times a day before meals  insulin lispro (HumaLOG) corrective regimen sliding scale   SubCutaneous at bedtime  insulin lispro Injectable (HumaLOG) 5 Unit(s) SubCutaneous three times a day before meals  insulin lispro Injectable (HumaLOG) 4 Unit(s) SubCutaneous at bedtime  metoprolol succinate ER 50 milliGRAM(s) Oral every 12 hours  multivitamin 1 Tablet(s) Oral daily  pantoprazole    Tablet 40 milliGRAM(s) Oral before breakfast    MEDICATIONS  (PRN):  acetaminophen   Tablet .. 650 milliGRAM(s) Oral every 6 hours PRN Temp greater or equal to 38.5C (101.3F), Mild Pain (1 - 3)  dextrose 40% Gel 15 Gram(s) Oral once PRN Blood Glucose LESS THAN 70 milliGRAM(s)/deciliter  glucagon  Injectable 1 milliGRAM(s) IntraMuscular once PRN Glucose LESS THAN 70 milligrams/deciliter  oxyCODONE    5 mG/acetaminophen 325 mG 2 Tablet(s) Oral every 6 hours PRN Moderate Pain (4 - 6)  senna 2 Tablet(s) Oral at bedtime PRN Constipation      Vital Signs Last 24 Hrs  T(C): 36.6 (05 Aug 2019 15:15), Max: 36.6 (05 Aug 2019 04:51)  T(F): 97.8 (05 Aug 2019 15:15), Max: 97.9 (05 Aug 2019 04:51)  HR: 76 (05 Aug 2019 15:15) (75 - 109)  BP: 112/- (05 Aug 2019 15:15) (112/- - 142/56)  BP(mean): --  RR: 18 (05 Aug 2019 15:15) (16 - 18)  SpO2: 100% (05 Aug 2019 15:15) (94% - 100%)    PHYSICAL EXAM:  GENERAL: NAD, frail  HEAD:  Atraumatic, Normocephalic  EYES: EOMI, PERRLA, conjunctiva and sclera clear  NECK: Supple, No JVD  CHEST/LUNG: Clear to auscultation bilaterally; No wheeze  HEART: Regular rate and rhythm; No murmurs, rubs, or gallops  ABDOMEN: Soft, Nontender, Nondistended; Bowel sounds present  EXTREMITIES:  2+ bl edema  PSYCH: AAOx3  NEUROLOGY: non-focal  SKIN: No rashes or lesions    LABS:                        9.5    5.17  )-----------( 180      ( 05 Aug 2019 08:02 )             30.1     08-05    137  |  91<L>  |  36<H>  ----------------------------<  247<H>  4.5   |  25  |  5.10<H>    Ca    9.6      05 Aug 2019 05:48  Mg     2.1     08-04            Telemetry SR 70<--->Junctional 100      RADIOLOGY & ADDITIONAL TESTS:    Imaging Personally Reviewed:    Consultant(s) Notes Reviewed:      Care Discussed with Consultants/Other Providers:

## 2019-08-06 LAB
ANION GAP SERPL CALC-SCNC: 15 MMOL/L — SIGNIFICANT CHANGE UP (ref 5–17)
BUN SERPL-MCNC: 28 MG/DL — HIGH (ref 7–23)
CALCIUM SERPL-MCNC: 9.6 MG/DL — SIGNIFICANT CHANGE UP (ref 8.4–10.5)
CHLORIDE SERPL-SCNC: 95 MMOL/L — LOW (ref 96–108)
CO2 SERPL-SCNC: 28 MMOL/L — SIGNIFICANT CHANGE UP (ref 22–31)
CREAT SERPL-MCNC: 4.22 MG/DL — HIGH (ref 0.5–1.3)
GLUCOSE BLDC GLUCOMTR-MCNC: 118 MG/DL — HIGH (ref 70–99)
GLUCOSE BLDC GLUCOMTR-MCNC: 185 MG/DL — HIGH (ref 70–99)
GLUCOSE BLDC GLUCOMTR-MCNC: 271 MG/DL — HIGH (ref 70–99)
GLUCOSE BLDC GLUCOMTR-MCNC: 428 MG/DL — HIGH (ref 70–99)
GLUCOSE BLDC GLUCOMTR-MCNC: 95 MG/DL — SIGNIFICANT CHANGE UP (ref 70–99)
GLUCOSE SERPL-MCNC: 393 MG/DL — HIGH (ref 70–99)
POTASSIUM SERPL-MCNC: 4.7 MMOL/L — SIGNIFICANT CHANGE UP (ref 3.5–5.3)
POTASSIUM SERPL-SCNC: 4.7 MMOL/L — SIGNIFICANT CHANGE UP (ref 3.5–5.3)
SODIUM SERPL-SCNC: 138 MMOL/L — SIGNIFICANT CHANGE UP (ref 135–145)

## 2019-08-06 PROCEDURE — 99232 SBSQ HOSP IP/OBS MODERATE 35: CPT

## 2019-08-06 RX ORDER — ISOSORBIDE DINITRATE 5 MG/1
10 TABLET ORAL THREE TIMES A DAY
Refills: 0 | Status: DISCONTINUED | OUTPATIENT
Start: 2019-08-06 | End: 2019-08-08

## 2019-08-06 RX ORDER — INSULIN LISPRO 100/ML
2 VIAL (ML) SUBCUTANEOUS
Refills: 0 | Status: DISCONTINUED | OUTPATIENT
Start: 2019-08-06 | End: 2019-08-08

## 2019-08-06 RX ADMIN — OXYCODONE AND ACETAMINOPHEN 2 TABLET(S): 5; 325 TABLET ORAL at 15:35

## 2019-08-06 RX ADMIN — OXYCODONE AND ACETAMINOPHEN 2 TABLET(S): 5; 325 TABLET ORAL at 16:05

## 2019-08-06 RX ADMIN — Medication 5 UNIT(S): at 11:33

## 2019-08-06 RX ADMIN — Medication 1 TABLET(S): at 15:34

## 2019-08-06 RX ADMIN — Medication 25 MILLIGRAM(S): at 19:24

## 2019-08-06 RX ADMIN — INSULIN GLARGINE 7 UNIT(S): 100 INJECTION, SOLUTION SUBCUTANEOUS at 21:55

## 2019-08-06 RX ADMIN — Medication 3 MILLILITER(S): at 05:48

## 2019-08-06 RX ADMIN — Medication 3 MILLILITER(S): at 00:24

## 2019-08-06 RX ADMIN — Medication 1 APPLICATION(S): at 22:00

## 2019-08-06 RX ADMIN — OXYCODONE AND ACETAMINOPHEN 2 TABLET(S): 5; 325 TABLET ORAL at 00:23

## 2019-08-06 RX ADMIN — Medication 81 MILLIGRAM(S): at 15:34

## 2019-08-06 RX ADMIN — Medication 25 MILLIGRAM(S): at 05:49

## 2019-08-06 RX ADMIN — ATORVASTATIN CALCIUM 20 MILLIGRAM(S): 80 TABLET, FILM COATED ORAL at 21:56

## 2019-08-06 RX ADMIN — Medication 3 MILLILITER(S): at 11:04

## 2019-08-06 RX ADMIN — Medication 3 MILLILITER(S): at 23:24

## 2019-08-06 RX ADMIN — Medication 3: at 11:02

## 2019-08-06 RX ADMIN — OXYCODONE AND ACETAMINOPHEN 2 TABLET(S): 5; 325 TABLET ORAL at 01:00

## 2019-08-06 RX ADMIN — Medication 50 MILLIGRAM(S): at 17:58

## 2019-08-06 RX ADMIN — Medication 1: at 14:50

## 2019-08-06 RX ADMIN — Medication 1: at 02:29

## 2019-08-06 RX ADMIN — Medication 50 MILLIGRAM(S): at 05:49

## 2019-08-06 RX ADMIN — ISOSORBIDE DINITRATE 10 MILLIGRAM(S): 5 TABLET ORAL at 21:56

## 2019-08-06 RX ADMIN — CLOPIDOGREL BISULFATE 75 MILLIGRAM(S): 75 TABLET, FILM COATED ORAL at 15:34

## 2019-08-06 RX ADMIN — ISOSORBIDE DINITRATE 10 MILLIGRAM(S): 5 TABLET ORAL at 15:40

## 2019-08-06 RX ADMIN — PANTOPRAZOLE SODIUM 40 MILLIGRAM(S): 20 TABLET, DELAYED RELEASE ORAL at 05:49

## 2019-08-06 RX ADMIN — Medication 100 MILLIGRAM(S): at 05:49

## 2019-08-06 RX ADMIN — Medication 3 MILLILITER(S): at 17:56

## 2019-08-06 RX ADMIN — Medication 5 UNIT(S): at 14:50

## 2019-08-06 RX ADMIN — Medication 5 UNIT(S): at 17:58

## 2019-08-06 RX ADMIN — Medication 4 UNIT(S): at 21:55

## 2019-08-06 NOTE — PROGRESS NOTE ADULT - SUBJECTIVE AND OBJECTIVE BOX
Diabetes Follow up note: Saw pt earlier today  Interval Hx: 65y/o M w/h/o uncontrolled TIDM (HbA1c 8.2% 6/19) c/b neuropathy and retinopathy as well as CAD s/p multiple stents, CHF (EF 50% 10/2018), TIA, PVD s/p b/l fem-pop bypass, ESRD> HD M/T/Th/Sat, presenting with worsening SOB and LE edema and hyperglycemia. Pt reports tolerating POs but eating at night as well.  States she gets very hungry at night and eats. Reports having a roast beef sandwich at 2am. Pt states her  brought it for her last night.  No FBG POC noted as taken yet today but BMP glucose this am was 393mg/dL. BG are better during the day time. No hypoglycemia.      Review of Systems:  General: No complaints. Wants to go home soon.   GI: Tolerating POs without any N/V/D/ABD PAIN.  CV: No CP/SOB  ENDO: No S&Sx of hypoglycemia      MEDS:  atorvastatin 20 milliGRAM(s) Oral at bedtime  insulin glargine Injectable (LANTUS) 7 Unit(s) SubCutaneous at bedtime  insulin lispro (HumaLOG) corrective regimen sliding scale   SubCutaneous three times a day before meals  insulin lispro (HumaLOG) corrective regimen sliding scale   SubCutaneous <User Schedule>  insulin lispro (HumaLOG) corrective regimen sliding scale   SubCutaneous at bedtime  insulin lispro Injectable (HumaLOG) 4 Unit(s) SubCutaneous at bedtime  insulin lispro Injectable (HumaLOG) 5 Unit(s) SubCutaneous three times a day before meals    Allergies    No Known Allergies        PE:  General: Female walking around her room in NAD.   Vital Signs Last 24 Hrs  T(C): 36.8 (08-06-19 @ 08:44), Max: 36.8 (08-06-19 @ 00:18)  T(F): 98.3 (08-06-19 @ 08:44), Max: 98.3 (08-06-19 @ 08:44)  HR: 90 (08-06-19 @ 08:44) (76 - 90)  BP: 146/83 (08-06-19 @ 08:44) (112/- - 146/83)  BP(mean): --  RR: 18 (08-06-19 @ 08:44) (16 - 18)  SpO2: 94% (08-06-19 @ 08:44) (94% - 100%)  Abd: Soft, NT, ND,   Extremities: Warm. B/L LE 2+ edema. Noted improvement since last saw pt.  Neuro: A&O X3    LABS:  POCT Blood Glucose.: 271 mg/dL (08-06-19 @ 02:06)  POCT Blood Glucose.: 232 mg/dL (08-05-19 @ 21:32)  POCT Blood Glucose.: 155 mg/dL (08-05-19 @ 19:05)  POCT Blood Glucose.: 130 mg/dL (08-05-19 @ 12:58)  POCT Blood Glucose.: 181 mg/dL (08-05-19 @ 09:16)  POCT Blood Glucose.: 407 mg/dL (08-05-19 @ 02:39)  POCT Blood Glucose.: 346 mg/dL (08-04-19 @ 21:53)  POCT Blood Glucose.: 236 mg/dL (08-04-19 @ 15:54)  POCT Blood Glucose.: 288 mg/dL (08-04-19 @ 13:30)                        9.5    5.17  )-----------( 180      ( 05 Aug 2019 08:02 )             30.1     08-06    138  |  95<L>  |  28<H>  ----------------------------<  393<H>  4.7   |  28  |  4.22<H>    Ca    9.6      06 Aug 2019 06:26      Hemoglobin A1C, Whole Blood: 8.2 % <H> [4.0 - 5.6] (06-16-19 @ 13:24)

## 2019-08-06 NOTE — PROGRESS NOTE ADULT - ASSESSMENT
64 f with    ESRD/ fluid overload  - HD  - Nephrology follow Dr. Schmidt    DM type 1  - ADA diet  - BS control  - endocrine follow    CHF cr systolic  - continue Rx  - cardiology follow    PAT  - follow    CAD/ s/p NSTEMI  - continue Rx  - cardiology follow    COPD  - nebs    PVD  - conservative Rx    Anxiety/ Depression control    Epistaxis resolved  - ENT follow.    d/w patient   Pierce Viera MD pager [phone]

## 2019-08-06 NOTE — PROGRESS NOTE ADULT - PROBLEM SELECTOR PLAN 1
-test BG AC/HS/2AM  -c/w Lantus 7 units QHS  -c/w Humalog 5 units AC meals  -c/w Humalog 4 units w/HS snack  -Add Humalog 2 units at 2am if eating at this time. HOLD IF NOT EATING!  -c/w Humalog adjusted low correction scale AC and Low HS/2AM scale (1-4 units >250mg/dl).   -Plan discussed with pt/team. Spoke to RN to test BG now.  Contact info: [phone] (24/7). pager [phone]

## 2019-08-06 NOTE — PROGRESS NOTE ADULT - ASSESSMENT
64 Female hx CAD (Cath Feb '19 showing 99% RCA, 100% RPLS, 100% Cx) s/p multiple stents/brachytherapy, ESRD (on HD M/T/T/Sa), DM1 c/b neuropathy and retinopathy, CHF, mod MR, severe AS, RHF, TIA, severe PVD s/p b/l fempop bypass, left subclavian vein stenosis s/p stent, COPD not on O2 pw cc of LE swelling and ct scan suggestive of CHF. also with hyperkalemia and thrombophlebitis     1- esrd  2- hyperkalemia improved   3- pulm edema  4- htn       HD F 1 60 3.5 hours.  2K bath Blood flow rate 400 Dialysate  Flow rate 600 2.3 liter   still quite fluid overloaded   UF in am

## 2019-08-06 NOTE — PROGRESS NOTE ADULT - ASSESSMENT
65 y/o M w/h/o uncontrolled TIDM (HbA1c 8.2% 6/19) c/b neuropathy and retinopathy as well as CAD s/p multiple stents, CHF (EF 50% 10/2018), TIA, PVD s/p b/l fem-pop bypass, ESRD> HD M/T/Th/Sat, presenting with worsening SOB and LE edema. Found to have CHF exacerbation/fluid retention/hyperkalemia and thrombophlebitis. On HD with persistent edema in LEs. Glycemic control difficult to achieve secondary to unpredictable and erratic PO intake specially over night. Pt eats at bedtime and then in the middle of the night as well. Will add Humalog low dose at 2am if pt eats. Since pt has T1DM she needs to take insulin every time she eats carbs which she does during the night almost every night. Pt reluctant to avoid eating at night and states she is going to eat if she feels hungry.  BG goal at this time is to prevent hypoglycemia and/or persistent hyperglycemia. Pt is not willing to participate on DM care. At home pt's DM is managed but  and daughter since pt lacks insight regarding importance of glycemic control.

## 2019-08-06 NOTE — PROGRESS NOTE ADULT - SUBJECTIVE AND OBJECTIVE BOX
Cardiovascular Disease Progress Note    Overnight events: No acute events overnight.  intermittent accelerated junctional tachycardia. resting comfortably. no new cardiac sx noted  Otherwise review of systems negative    Objective Findings:  T(C): 36.4 (19 @ 04:20), Max: 36.8 (19 @ 00:18)  HR: 82 (19 @ 04:20) (76 - 88)  BP: 146/75 (19 @ 04:20) (112/- - 146/75)  RR: 18 (19 @ 04:20) (16 - 18)  SpO2: 99% (19 @ 04:20) (97% - 100%)  Wt(kg): --  Daily     Daily Weight in k.1 (05 Aug 2019 18:30)      Physical Exam:  Gen: NAD  HEENT: EOMI  CV: RRR, normal S1 + S2, no m/r/g  Lungs: CTAB  Abd: soft, non-tender  Ext: No edema    Telemetry: nsr, acceelerated junctional tach    Laboratory Data:                        9.5    5.17  )-----------( 180      ( 05 Aug 2019 08:02 )             30.1     08-06    138  |  95<L>  |  28<H>  ----------------------------<  393<H>  4.7   |  28  |  4.22<H>    Ca    9.6      06 Aug 2019 06:26                Inpatient Medications:  MEDICATIONS  (STANDING):  ALBUTerol/ipratropium for Nebulization 3 milliLiter(s) Nebulizer every 6 hours  aspirin enteric coated 81 milliGRAM(s) Oral daily  atorvastatin 20 milliGRAM(s) Oral at bedtime  BACItracin   Ointment 1 Application(s) Topical two times a day  clopidogrel Tablet 75 milliGRAM(s) Oral daily  dextrose 5%. 1000 milliLiter(s) (50 mL/Hr) IV Continuous <Continuous>  dextrose 50% Injectable 12.5 Gram(s) IV Push once  dextrose 50% Injectable 25 Gram(s) IV Push once  dextrose 50% Injectable 25 Gram(s) IV Push once  docusate sodium 100 milliGRAM(s) Oral three times a day  heparin  Injectable 5000 Unit(s) SubCutaneous every 12 hours  hydrALAZINE 25 milliGRAM(s) Oral three times a day  insulin glargine Injectable (LANTUS) 7 Unit(s) SubCutaneous at bedtime  insulin lispro (HumaLOG) corrective regimen sliding scale   SubCutaneous three times a day before meals  insulin lispro (HumaLOG) corrective regimen sliding scale   SubCutaneous <User Schedule>  insulin lispro (HumaLOG) corrective regimen sliding scale   SubCutaneous at bedtime  insulin lispro Injectable (HumaLOG) 5 Unit(s) SubCutaneous three times a day before meals  insulin lispro Injectable (HumaLOG) 4 Unit(s) SubCutaneous at bedtime  metoprolol succinate ER 50 milliGRAM(s) Oral every 12 hours  multivitamin 1 Tablet(s) Oral daily  pantoprazole    Tablet 40 milliGRAM(s) Oral before breakfast      Assessment:  -chest pain with mild ekg changes and stable hs trop  -CHF exacerbation  -NSVT  -pAT  -volume overload  -ischemic cardiomyopathy, cad s/p multiple stents  -esrd on hd  -PAD s/p prior intervention         Recs:  -resolved chest pain/angina   -known extensive CAD and ICM. s/p [facility] 2019 --> severe and diffuse instent restenosis along RCA --> unable to stent due to multiple layers of stenting and prior brachytherapy  -will consider complex intervention if true ischemic and refractory sx develop   -c/w beta blockers for nsvt/pat/cad (as above).c/w  toprol 50mg bid, cont nitrates as tolerates (would start isordil 10mg tid)  -hx of prolonged qtc. avoid qtc prolonging meds  -s/p TTE 2019: mod-severe MR, pseudo AS (low flow-low gradient), mod-severe LV dysfunction --> recent CTS consult with dr hebert appreciated. plan is for medical management given surgical risk and patient preference  -c/w asa, plavix, statin for ischemic cardiomyopathy/CAD/PAD  -dvt ppx            Over 25 minutes spent on total encounter; more than 50% of the visit was spent counseling and/or coordinating care by the attending physician.      Julián Biswas MD   Cardiovascular Disease  [phone]

## 2019-08-06 NOTE — PROGRESS NOTE ADULT - SUBJECTIVE AND OBJECTIVE BOX
[facility]   [phone]  RENAL FOLLOW UP NOTE  --------------------------------------------------------------------------------  Chief Complaint:    24 hour events/subjective:    seen  still with c/o edema     PAST HISTORY  --------------------------------------------------------------------------------  No significant changes to PMH, PSH, FHx, SHx, unless otherwise noted    ALLERGIES & MEDICATIONS  --------------------------------------------------------------------------------  Allergies    No Known Allergies    Intolerances      Standing Inpatient Medications  ALBUTerol/ipratropium for Nebulization 3 milliLiter(s) Nebulizer every 6 hours  aspirin enteric coated 81 milliGRAM(s) Oral daily  atorvastatin 20 milliGRAM(s) Oral at bedtime  BACItracin   Ointment 1 Application(s) Topical two times a day  clopidogrel Tablet 75 milliGRAM(s) Oral daily  dextrose 5%. 1000 milliLiter(s) IV Continuous <Continuous>  dextrose 50% Injectable 12.5 Gram(s) IV Push once  dextrose 50% Injectable 25 Gram(s) IV Push once  dextrose 50% Injectable 25 Gram(s) IV Push once  docusate sodium 100 milliGRAM(s) Oral three times a day  heparin  Injectable 5000 Unit(s) SubCutaneous every 12 hours  hydrALAZINE 25 milliGRAM(s) Oral three times a day  insulin glargine Injectable (LANTUS) 7 Unit(s) SubCutaneous at bedtime  insulin lispro (HumaLOG) corrective regimen sliding scale   SubCutaneous three times a day before meals  insulin lispro (HumaLOG) corrective regimen sliding scale   SubCutaneous <User Schedule>  insulin lispro (HumaLOG) corrective regimen sliding scale   SubCutaneous at bedtime  insulin lispro Injectable (HumaLOG) 2 Unit(s) SubCutaneous <User Schedule>  insulin lispro Injectable (HumaLOG) 5 Unit(s) SubCutaneous three times a day before meals  insulin lispro Injectable (HumaLOG) 4 Unit(s) SubCutaneous at bedtime  isosorbide   dinitrate Tablet (ISORDIL) 10 milliGRAM(s) Oral three times a day  metoprolol succinate ER 50 milliGRAM(s) Oral every 12 hours  multivitamin 1 Tablet(s) Oral daily  pantoprazole    Tablet 40 milliGRAM(s) Oral before breakfast    PRN Inpatient Medications  acetaminophen   Tablet .. 650 milliGRAM(s) Oral every 6 hours PRN  dextrose 40% Gel 15 Gram(s) Oral once PRN  glucagon  Injectable 1 milliGRAM(s) IntraMuscular once PRN  oxyCODONE    5 mG/acetaminophen 325 mG 2 Tablet(s) Oral every 6 hours PRN  senna 2 Tablet(s) Oral at bedtime PRN      REVIEW OF SYSTEMS  --------------------------------------------------------------------------------    Gen: denies fevers/chills,  CVS: denies chest pain/palpitations  Resp: denies worsening SOB/Cough  GI: Denies N/V/Abd pain  : Denies dysuria    All other systems were reviewed and are negative, except as noted.    VITALS/PHYSICAL EXAM  --------------------------------------------------------------------------------  T(C): 36.5 (08-06-19 @ 15:15), Max: 36.8 (08-06-19 @ 00:18)  HR: 90 (08-06-19 @ 19:23) (78 - 94)  BP: 115/65 (08-06-19 @ 19:23) (115/65 - 157/59)  RR: 18 (08-06-19 @ 15:15) (16 - 18)  SpO2: 99% (08-06-19 @ 15:15) (94% - 100%)  Wt(kg): --        08-05-19 @ 07:01  -  08-06-19 @ 07:00  --------------------------------------------------------  IN: 1860 mL / OUT: 3200 mL / NET: -1340 mL    08-06-19 @ 07:01  -  08-06-19 @ 19:43  --------------------------------------------------------  IN: 1340 mL / OUT: 3100 mL / NET: -1760 mL      Physical Exam:  	  Gen: alert oriented place person and date   	Pulm: Decreased breath sounds b/l bases no rales or ronchi  	CV: RRR, S1/S2  	Abd: +BS, soft, nontender/nondistended  	Extremity: No cyanosis, 2-3-   edema no clubbing    	      LABS/STUDIES  --------------------------------------------------------------------------------              9.5    5.17  >-----------<  180      [08-05-19 @ 08:02]              30.1     138  |  95  |  28  ----------------------------<  393      [08-06-19 @ 06:26]  4.7   |  28  |  4.22        Ca     9.6     [08-06-19 @ 06:26]            Creatinine Trend:  SCr 4.22 [08-06 @ 06:26]  SCr 5.10 [08-05 @ 05:48]  SCr 3.69 [08-04 @ 05:23]  SCr 3.64 [08-03 @ 16:13]  SCr 5.95 [08-02 @ 06:17]                  Iron 42, TIBC 253, %sat 17      [06-17-19 @ 17:54]  Ferritin 1838      [06-17-19 @ 18:06]  PTH -- (Ca 9.6)      [06-17-19 @ 18:06]   177  PTH -- (Ca 7.8)      [03-29-19 @ 20:38]   73  PTH -- (Ca 7.3)      [02-22-19 @ 02:49]   59  HbA1c 8.2      [06-16-19 @ 13:24]  TSH 0.71      [11-17-18 @ 08:25]

## 2019-08-06 NOTE — PROGRESS NOTE ADULT - SUBJECTIVE AND OBJECTIVE BOX
Patient is a 64y old  Female who presents with a chief complaint of sob (06 Aug 2019 19:41)      SUBJECTIVE / OVERNIGHT EVENTS: weak, tired.   Review of Systems  chest pain no  palpitations no  sob no  nausea no  headache no    MEDICATIONS  (STANDING):  ALBUTerol/ipratropium for Nebulization 3 milliLiter(s) Nebulizer every 6 hours  aspirin enteric coated 81 milliGRAM(s) Oral daily  atorvastatin 20 milliGRAM(s) Oral at bedtime  BACItracin   Ointment 1 Application(s) Topical two times a day  clopidogrel Tablet 75 milliGRAM(s) Oral daily  dextrose 5%. 1000 milliLiter(s) (50 mL/Hr) IV Continuous <Continuous>  dextrose 50% Injectable 12.5 Gram(s) IV Push once  dextrose 50% Injectable 25 Gram(s) IV Push once  dextrose 50% Injectable 25 Gram(s) IV Push once  docusate sodium 100 milliGRAM(s) Oral three times a day  heparin  Injectable 5000 Unit(s) SubCutaneous every 12 hours  hydrALAZINE 25 milliGRAM(s) Oral three times a day  insulin glargine Injectable (LANTUS) 7 Unit(s) SubCutaneous at bedtime  insulin lispro (HumaLOG) corrective regimen sliding scale   SubCutaneous three times a day before meals  insulin lispro (HumaLOG) corrective regimen sliding scale   SubCutaneous <User Schedule>  insulin lispro (HumaLOG) corrective regimen sliding scale   SubCutaneous at bedtime  insulin lispro Injectable (HumaLOG) 2 Unit(s) SubCutaneous <User Schedule>  insulin lispro Injectable (HumaLOG) 5 Unit(s) SubCutaneous three times a day before meals  insulin lispro Injectable (HumaLOG) 4 Unit(s) SubCutaneous at bedtime  isosorbide   dinitrate Tablet (ISORDIL) 10 milliGRAM(s) Oral three times a day  metoprolol succinate ER 50 milliGRAM(s) Oral every 12 hours  multivitamin 1 Tablet(s) Oral daily  pantoprazole    Tablet 40 milliGRAM(s) Oral before breakfast    MEDICATIONS  (PRN):  acetaminophen   Tablet .. 650 milliGRAM(s) Oral every 6 hours PRN Temp greater or equal to 38.5C (101.3F), Mild Pain (1 - 3)  dextrose 40% Gel 15 Gram(s) Oral once PRN Blood Glucose LESS THAN 70 milliGRAM(s)/deciliter  glucagon  Injectable 1 milliGRAM(s) IntraMuscular once PRN Glucose LESS THAN 70 milligrams/deciliter  oxyCODONE    5 mG/acetaminophen 325 mG 2 Tablet(s) Oral every 6 hours PRN Severe Pain (7 - 10)  senna 2 Tablet(s) Oral at bedtime PRN Constipation      Vital Signs Last 24 Hrs  T(C): 36.5 (06 Aug 2019 15:15), Max: 36.8 (06 Aug 2019 00:18)  T(F): 97.7 (06 Aug 2019 15:15), Max: 98.3 (06 Aug 2019 08:44)  HR: 90 (06 Aug 2019 19:23) (78 - 94)  BP: 115/65 (06 Aug 2019 19:23) (115/65 - 157/59)  BP(mean): --  RR: 18 (06 Aug 2019 15:15) (16 - 18)  SpO2: 99% (06 Aug 2019 15:15) (94% - 100%)    PHYSICAL EXAM:  GENERAL: NAD, sick  HEAD:  Atraumatic, Normocephalic  EYES: EOMI, PERRLA, conjunctiva and sclera clear  NECK: Supple, No JVD  CHEST/LUNG: Clear to auscultation bilaterally; No wheeze  HEART: Regular rate and rhythm; No murmurs, rubs, or gallops  ABDOMEN: Soft, Nontender, Nondistended; Bowel sounds present  EXTREMITIES:  2+bl edema  PSYCH: depressed  NEUROLOGY: non-focal  SKIN: No rashes or lesions    LABS:                        9.5    5.17  )-----------( 180      ( 05 Aug 2019 08:02 )             30.1     08-06    138  |  95<L>  |  28<H>  ----------------------------<  393<H>  4.7   |  28  |  4.22<H>    Ca    9.6      06 Aug 2019 06:26                  RADIOLOGY & ADDITIONAL TESTS:    Imaging Personally Reviewed:    Consultant(s) Notes Reviewed:      Care Discussed with Consultants/Other Providers:

## 2019-08-07 LAB
ANION GAP SERPL CALC-SCNC: 14 MMOL/L — SIGNIFICANT CHANGE UP (ref 5–17)
BUN SERPL-MCNC: 20 MG/DL — SIGNIFICANT CHANGE UP (ref 7–23)
CALCIUM SERPL-MCNC: 9.5 MG/DL — SIGNIFICANT CHANGE UP (ref 8.4–10.5)
CHLORIDE SERPL-SCNC: 95 MMOL/L — LOW (ref 96–108)
CO2 SERPL-SCNC: 28 MMOL/L — SIGNIFICANT CHANGE UP (ref 22–31)
CREAT SERPL-MCNC: 3.93 MG/DL — HIGH (ref 0.5–1.3)
GLUCOSE BLDC GLUCOMTR-MCNC: 148 MG/DL — HIGH (ref 70–99)
GLUCOSE BLDC GLUCOMTR-MCNC: 308 MG/DL — HIGH (ref 70–99)
GLUCOSE BLDC GLUCOMTR-MCNC: 328 MG/DL — HIGH (ref 70–99)
GLUCOSE BLDC GLUCOMTR-MCNC: 366 MG/DL — HIGH (ref 70–99)
GLUCOSE BLDC GLUCOMTR-MCNC: 389 MG/DL — HIGH (ref 70–99)
GLUCOSE SERPL-MCNC: 229 MG/DL — HIGH (ref 70–99)
PHOSPHATE SERPL-MCNC: 4.4 MG/DL — SIGNIFICANT CHANGE UP (ref 2.5–4.5)
POTASSIUM SERPL-MCNC: 4.2 MMOL/L — SIGNIFICANT CHANGE UP (ref 3.5–5.3)
POTASSIUM SERPL-SCNC: 4.2 MMOL/L — SIGNIFICANT CHANGE UP (ref 3.5–5.3)
SODIUM SERPL-SCNC: 137 MMOL/L — SIGNIFICANT CHANGE UP (ref 135–145)

## 2019-08-07 PROCEDURE — 99232 SBSQ HOSP IP/OBS MODERATE 35: CPT

## 2019-08-07 RX ADMIN — INSULIN GLARGINE 7 UNIT(S): 100 INJECTION, SOLUTION SUBCUTANEOUS at 22:26

## 2019-08-07 RX ADMIN — Medication 3 MILLILITER(S): at 23:56

## 2019-08-07 RX ADMIN — Medication 25 MILLIGRAM(S): at 15:44

## 2019-08-07 RX ADMIN — Medication 1 TABLET(S): at 15:44

## 2019-08-07 RX ADMIN — ISOSORBIDE DINITRATE 10 MILLIGRAM(S): 5 TABLET ORAL at 22:25

## 2019-08-07 RX ADMIN — OXYCODONE AND ACETAMINOPHEN 2 TABLET(S): 5; 325 TABLET ORAL at 16:15

## 2019-08-07 RX ADMIN — OXYCODONE AND ACETAMINOPHEN 2 TABLET(S): 5; 325 TABLET ORAL at 22:26

## 2019-08-07 RX ADMIN — CLOPIDOGREL BISULFATE 75 MILLIGRAM(S): 75 TABLET, FILM COATED ORAL at 15:44

## 2019-08-07 RX ADMIN — Medication 1 APPLICATION(S): at 05:35

## 2019-08-07 RX ADMIN — ISOSORBIDE DINITRATE 10 MILLIGRAM(S): 5 TABLET ORAL at 15:46

## 2019-08-07 RX ADMIN — Medication 50 MILLIGRAM(S): at 05:35

## 2019-08-07 RX ADMIN — Medication 3 MILLILITER(S): at 05:34

## 2019-08-07 RX ADMIN — Medication 4 UNIT(S): at 22:27

## 2019-08-07 RX ADMIN — Medication 3: at 17:58

## 2019-08-07 RX ADMIN — Medication 5 UNIT(S): at 12:35

## 2019-08-07 RX ADMIN — OXYCODONE AND ACETAMINOPHEN 2 TABLET(S): 5; 325 TABLET ORAL at 23:00

## 2019-08-07 RX ADMIN — Medication 25 MILLIGRAM(S): at 22:26

## 2019-08-07 RX ADMIN — Medication 2: at 08:43

## 2019-08-07 RX ADMIN — Medication 3: at 22:26

## 2019-08-07 RX ADMIN — ATORVASTATIN CALCIUM 20 MILLIGRAM(S): 80 TABLET, FILM COATED ORAL at 22:25

## 2019-08-07 RX ADMIN — Medication 2: at 12:36

## 2019-08-07 RX ADMIN — OXYCODONE AND ACETAMINOPHEN 2 TABLET(S): 5; 325 TABLET ORAL at 15:43

## 2019-08-07 RX ADMIN — Medication 81 MILLIGRAM(S): at 15:43

## 2019-08-07 RX ADMIN — Medication 25 MILLIGRAM(S): at 05:34

## 2019-08-07 RX ADMIN — Medication 5 UNIT(S): at 08:42

## 2019-08-07 RX ADMIN — Medication 50 MILLIGRAM(S): at 17:26

## 2019-08-07 RX ADMIN — Medication 5 UNIT(S): at 17:58

## 2019-08-07 RX ADMIN — PANTOPRAZOLE SODIUM 40 MILLIGRAM(S): 20 TABLET, DELAYED RELEASE ORAL at 05:34

## 2019-08-07 RX ADMIN — ISOSORBIDE DINITRATE 10 MILLIGRAM(S): 5 TABLET ORAL at 05:35

## 2019-08-07 NOTE — DIETITIAN INITIAL EVALUATION ADULT. - OTHER INFO
DIET HISTORY PTA: Pt reports "ravenous" appetite and excellent intake PTA. Reports eating at all times of the day; typical foods include sandwiches, chicken, pasta, "flavored rice". Reports she follows a diet for HD, however states "I don't eat any vegetables or meat besides chicken because they all have potassium", suggesting lack of knowledge regarding necessary restrictions. Pt also has history of T1DM, reports taking Lantus and Humalog PTA, pt did not wish to provide further detail regarding how she doses insulin or how often she checks her blood sugar. Per chart, "Pt's family manages her DM at home since she is forgetful and has trouble adherent to DM care." Recent HgbA1c in chart of 8.2% (6/16), suggests poor glycemic maintenance. Of note, pt with hyperglycemia in-house, pt reports she has been eating throughout the night because she wakes up hungry.    WEIGHT HISTORY: Per chart review of past RD notes, pt weights as follows: 116.8 pounds (3/1/18), 118.6 pounds (12/19/18), 121.2 pounds (1/24/19), 118 pounds (2/20/19), 104.4 pounds (4/1/19). Pt admitted with fluid retention at 135.3 pounds (7/31). Current in-house weight noted as 110.8 pounds. Pt reports UBW of 118 pounds, however pt is unsure whether this is her pre-HD or post-HD weight. Question accuracy of weights as pt on HD and weight changes are likely related to fluid shifts.     Pt denies chewing/swallowing issues, nausea/vomiting, diarrhea/constipation or other signs of acute GI distress at this time. Last BM yesterday 8/7.     INTERVENTION: Pt receptive to very brief education regarding DM and HD. Reviewed importance of increased protein needs, sources of protein that are acceptable with current diet restrictions, and sources of vegetables that are acceptable. Reinforced importance of carbohydrate consistency during the day and used MyPlate ideals to explain how to create a balanced meal. Pt accepted the following handouts: MyPlate Planner, Hemodialysis Diet. Pt offered Nepro oral nutrition supplement to increase/optimize protein intake to meet protein needs, however pt declined at this time. RD to continue to monitor for pt acceptance.

## 2019-08-07 NOTE — PROGRESS NOTE ADULT - SUBJECTIVE AND OBJECTIVE BOX
Diabetes Follow up note:  Interval Hx:  65y/o M w/h/o uncontrolled TIDM (HbA1c 8.2% 6/19) c/b neuropathy and retinopathy as well as CAD s/p multiple stents, CHF (EF 50% 10/2018), TIA, PVD s/p b/l fem-pop bypass, ESRD> HD M/T/Th/Sat, presenting with worsening SOB and LE edema and hyperglycemia. Pt w/fasting sugar in 300s this morning. Staff reports pt ate a sandwich during the night. Pt does not recall details of eating schedule. Undergoing HD and eating lunch when present at bedside.     Review of Systems:  General: "I feel fine"  GI: Tolerating POs without any N/V/D/ABD PAIN.  CV: No CP/SOB  ENDO: No S&Sx of hypoglycemia  MEDS:  atorvastatin 20 milliGRAM(s) Oral at bedtime    insulin glargine Injectable (LANTUS) 7 Unit(s) SubCutaneous at bedtime  insulin lispro (HumaLOG) corrective regimen sliding scale   SubCutaneous three times a day before meals  insulin lispro (HumaLOG) corrective regimen sliding scale   SubCutaneous <User Schedule>  insulin lispro (HumaLOG) corrective regimen sliding scale   SubCutaneous at bedtime  insulin lispro Injectable (HumaLOG) 2 Unit(s) SubCutaneous <User Schedule>  insulin lispro Injectable (HumaLOG) 5 Unit(s) SubCutaneous three times a day before meals  insulin lispro Injectable (HumaLOG) 4 Unit(s) SubCutaneous at bedtime      Allergies    No Known Allergies        PE:  General: Female lying in bed. NAD>   Vital Signs Last 24 Hrs  T(C): 36.4 (07 Aug 2019 12:27), Max: 36.8 (07 Aug 2019 04:54)  T(F): 97.5 (07 Aug 2019 12:27), Max: 98.2 (07 Aug 2019 04:54)  HR: 82 (07 Aug 2019 12:27) (81 - 90)  BP: 108/82 (07 Aug 2019 12:42) (100/59 - 128/69)  BP(mean): --  RR: 18 (07 Aug 2019 12:27) (18 - 18)  SpO2: 98% (07 Aug 2019 12:27) (96% - 99%)  Abd: Soft, NT,ND,   Extremities: Warm. + LE edema  Neuro: A&O X3. poor historian.     LABS:    POCT Blood Glucose.: 308 mg/dL (08-07-19 @ 12:23)  POCT Blood Glucose.: 328 mg/dL (08-07-19 @ 08:40)  POCT Blood Glucose.: 148 mg/dL (08-07-19 @ 02:01)  POCT Blood Glucose.: 118 mg/dL (08-06-19 @ 21:29)  POCT Blood Glucose.: 95 mg/dL (08-06-19 @ 17:53)  POCT Blood Glucose.: 185 mg/dL (08-06-19 @ 14:14)  POCT Blood Glucose.: 428 mg/dL (08-06-19 @ 10:39)  POCT Blood Glucose.: 271 mg/dL (08-06-19 @ 02:06)  POCT Blood Glucose.: 232 mg/dL (08-05-19 @ 21:32)  POCT Blood Glucose.: 155 mg/dL (08-05-19 @ 19:05)  POCT Blood Glucose.: 130 mg/dL (08-05-19 @ 12:58)  POCT Blood Glucose.: 181 mg/dL (08-05-19 @ 09:16)  POCT Blood Glucose.: 407 mg/dL (08-05-19 @ 02:39)  POCT Blood Glucose.: 346 mg/dL (08-04-19 @ 21:53)  POCT Blood Glucose.: 236 mg/dL (08-04-19 @ 15:54)          08-07    137  |  95<L>  |  20  ----------------------------<  229<H>  4.2   |  28  |  3.93<H>    Ca    9.5      07 Aug 2019 06:21  Phos  4.4     08-07        Hemoglobin A1C, Whole Blood: 8.2 % <H> [4.0 - 5.6] (06-16-19 @ 13:24)            Contact number: isabel [phone] or [phone]

## 2019-08-07 NOTE — DIETITIAN INITIAL EVALUATION ADULT. - +GENDER
"Return to clinic on Monday, May 1st for another follow up with me.   If you think her symptoms are worsening, please go to [facility].     Preventive Care at the [facility] Visit    Growth Measurements & Percentiles  Head Circumference: 14.17\" (36 cm) (92 %, Source: WHO (Girls, 0-2 years)) 92 %ile based on WHO (Girls, 0-2 years) head circumference-for-age data using vitals from 2017.   Birth Weight: 8 lbs 6.39 oz   Weight: 8 lbs 10.5 oz / 3.93 kg (actual weight) / 86 %ile based on WHO (Girls, 0-2 years) weight-for-age data using vitals from 2017.   Length: 1' 9\" / 53.3 cm 97 %ile based on WHO (Girls, 0-2 years) length-for-age data using vitals from 2017.   Weight for length: 30 %ile based on WHO (Girls, 0-2 years) weight-for-recumbent length data using vitals from 2017.    Recommended preventive visits for your :  2 weeks old  2 months old    Here s what your baby might be doing from birth to 2 months of age.    Growth and development    Begins to smile at familiar faces and voices, especially parents  voices.    Movements become less jerky.    Lifts chin for a few seconds when lying on the tummy.    Cannot hold head upright without support.    Holds onto an object that is placed in her hand.    Has a different cry for different needs, such as hunger or a wet diaper.    Has a fussy time, often in the evening.  This starts at about 2 to 3 weeks of age.    Makes noises and cooing sounds.    Usually gains 4 to 5 ounces per week.      Vision and hearing    Can see about one foot away at birth.  By 2 months, she can see about 10 feet away.    Starts to follow some moving objects with eyes.  Uses eyes to explore the world.    Makes eye contact.    Can see colors.    Hearing is fully developed.  She will be startled by loud sounds.    Things you can do to help your child  1. Talk and sing to your baby often.  2. Let your baby look at faces and bright colors.    All babies are different    The " "information here shows average development.  All babies develop at their own rate.  Certain behaviors and physical milestones tend to occur at certain ages, but there is a wide range of growth and behavior that is normal.  Your baby might reach some milestones earlier or later than the average child.  If you have any concerns about your baby s development, talk with your doctor or nurse.      Feeding  The only food your baby needs right now is breast milk or iron-fortified formula.  Your baby does not need water at this age.  Ask your doctor about giving your baby a Vitamin D supplement.    Breastfeeding tips    Breastfeed every 2-4 hours. If your baby is sleepy - use breast compression, push on chin to \"start up\" baby, switch breasts, undress to diaper and wake before relatching.     Some babies \"cluster\" feed every 1 hour for a while- this is normal. Feed your baby whenever he/she is awake-  even if every hour for a while. This frequent feeding will help you make more milk and encourage your baby to sleep for longer stretches later in the evening or night.      Position your baby close to you with pillows so he/she is facing you -belly to belly laying horizontally across your lap at the level of your breast and looking a bit \"upwards\" to your breast     One hand holds the baby's neck behind the ears and the other hand holds your breast    Baby's nose should start out pointing to your nipple before latching    Hold your breast in a \"sandwich\" position by gently squeezing your breast in an oval shape and make sure your hands are not covering the areola    This \"nipple sandwich\" will make it easier for your breast to fit inside the baby's mouth-making latching more comfortable for you and baby and preventing sore nipples. Your baby should take a \"mouthful\" of breast!    You may want to use hand expression to \"prime the pump\" and get a drip of milk out on your nipple to wake baby     (see website: " "newborns.[employer].edu/Breastfeeding/HandExpression.html)    Swipe your nipple on baby's upper lip and wait for a BIG open mouth    YOU bring baby to the breast (hold baby's neck with your fingers just below the ears) and bring baby's head to the breast--leading with the chin.  Try to avoid pushing your breast into baby's mouth- bring baby to you instead!    Aim to get your baby's bottom lip LOW DOWN ON AREOLA (baby's upper lip just needs to \"clear\" the nipple) .     Your baby should latch onto the areola and NOT just the nipple. That way your baby gets more milk and you don't get sore nipples!     Websites about breastfeeding  www.womenshealth.gov/breastfeeding - many topics and videos   www.Touchbase  - general information and videos about latching  http://newborns.[employer].edu/Breastfeeding/HandExpression.html - video about hand expression   http://newborns.[employer].edu/Breastfeeding/ABCs.html#ABCs  - general information  www.[employer].org - [facility] - information about breastfeeding and support groups    Formula  General guidelines    Age   # time/day   Serving Size     0-1 Month   6-8 times   2-4 oz     1-2 Months   5-7 times   3-5 oz     2-3 Months   4-6 times   4-7 oz     3-4 Months    4-6 times   5-8 oz       If bottle feeding your baby, hold the bottle.  Do not prop it up.    During the daytime, do not let your baby sleep more than four hours between feedings.  At night, it is normal for young babies to wake up to eat about every two to four hours.    Hold, cuddle and talk to your baby during feedings.    Do not give any other foods to your baby.  Your baby s body is not ready to handle them.    Babies like to suck.  For bottle-fed babies, try a pacifier if your baby needs to suck when not feeding.  If your baby is breastfeeding, try having her suck on your finger for comfort--wait two to three weeks (or until breast feeding is well established) before giving a pacifier, so the baby " learns to latch well first.    Never put formula or breast milk in the microwave.    To warm a bottle of formula or breast milk, place it in a bowl of warm water for a few minutes.  Before feeding your baby, make sure the breast milk or formula is not too hot.  Test it first by squirting it on the inside of your wrist.    Concentrated liquid or powdered formulas need to be mixed with water.  Follow the directions on the can.      Sleeping    Most babies will sleep about 16 hours a day or more.    You can do the following to reduce the risk of SIDS (sudden infant death syndrome):    Place your baby on her back.  Do not place your baby on her stomach or side.    Do not put pillows, loose blankets or stuffed animals under or near your baby.    If you think you baby is cold, put a second sleep sack on your child.    Never smoke around your baby.      If your baby sleeps in a crib or bassinet:    If you choose to have your baby sleep in a crib or bassinet, you should:      Use a firm, flat mattress.    Make sure the railings on the crib are no more than 2 3/8 inches apart.  Some older cribs are not safe because the railings are too far apart and could allow your baby s head to become trapped.    Remove any soft pillows or objects that could suffocate your baby.    Check that the mattress fits tightly against the sides of the bassinet or the railings of the crib so your baby s head cannot be trapped between the mattress and the sides.    Remove any decorative trimmings on the crib in which your baby s clothing could be caught.    Remove hanging toys, mobiles, and rattles when your baby can begin to sit up (around 5 or 6 months)    Lower the level of the mattress and remove bumper pads when your baby can pull himself to a standing position, so he will not be able to climb out of the crib.    Avoid loose bedding.      Elimination    Your baby:    May strain to pass stools (bowel movements).  This is normal as long as the  stools are soft, and she does not cry while passing them.    Has frequent, soft stools, which will be runny or pasty, yellow or green and  seedy.   This is normal.    Usually wets at least six diapers a day.      Safety      Always use an approved car seat.  This must be in the back seat of the car, facing backward.  For more information, check out www.seatcheck.org.    Never leave your baby alone with small children or pets.    Pick a safe place for your baby s crib.  Do not use an older drop-side crib.    Do not drink anything hot while holding your baby.    Don t smoke around your baby.    Never leave your baby alone in water.  Not even for a second.    Do not use sunscreen on your baby s skin.  Protect your baby from the sun with hats and canopies, or keep your baby in the shade.    Have a carbon monoxide detector near the furnace area.    Use properly working smoke detectors in your house.  Test your smoke detectors when daylight savings time begins and ends.      When to call the doctor    Call your baby s doctor or nurse if your baby:      Has a rectal temperature of 100.4 F (38 C) or higher.    Is very fussy for two hours or more and cannot be calmed or comforted.    Is very sleepy and hard to awaken.      What you can expect      You will likely be tired and busy    Spend time together with family and take time to relax.    If you are returning to work, you should think about .    You may feel overwhelmed, scared or exhausted.  Ask family or friends for help.  If you  feel blue  for more than 2 weeks, call your doctor.  You may have depression.    Being a parent is the biggest job you will ever have.  Support and information are important.  Reach out for help when you feel the need.      For more information on recommended immunizations:    www.cdc.gov/nip    For general medical information and more  Immunization facts go  to:  www.aap.org  www.aafp.org  www.[facility]view.org  www.cdc.gov/hepatitis  www.immunize.org  www.immunize.org/express  www.immunize.org/stories  www.vaccines.org    For early childhood family education programs in your school district, go to: www1.[employer].net/~deshawn    For help with food, housing, clothing, medicines and other essentials, call:  [employer]  at 698-948-5582      How often should by child/teen be seen for well check-ups?       (5-8 days)    2 weeks    2 months    4 months    6 months    9 months    12 months    15 months    18 months    24 months    3 years    4 years    5 years    6 years and every 1-2 years through 18 years of age    Follow up with me on Monday  [facility]    If you have any questions regarding to your visit please contact your care team:       Team [facility]:   Clinic Hours Telephone Number   [address] Dr.   7am-7pm  Monday - Thursday   7am-5pm    [phone]  (Appointment scheduling available )    Questions about your Visit?   Team Line:  [phone]   Urgent [facility] - [facility] - 11am-9pm Monday--- 9am-5pm   [place] - 5pm-9pm Monday--- 9am-5pm  [phone] - [place]   [phone] - [place]       What options do I have for visits at the clinic other than the traditional office visit?  To expand how we care for you, many of our providers are utilizing electronic visits (e-visits) and telephone visits, when medically appropriate, for interactions with their patients rather than a visit in the clinic.   We also offer nurse visits for many medical concerns. Just like any other service, we will bill your insurance company for this type of visit based on time spent on the phone with your provider. Not all insurance companies cover these visits. Please check with your medical insurance if this type of visit is covered.  You will be responsible for any charges that are not paid by your insurance.      E-visits via [employer]:  generally incur a $35.00 fee.  Telephone visits:  Time spent on the phone: *charged based on time that is spent on the phone in increments of 10 minutes. Estimated cost:   5-10 mins $30.00   11-20 mins. $59.00   21-30 mins. $85.00     Use [employer] (secure email communication and access to your chart) to send your primary care provider a message or make an appointment. Ask someone on your Team how to sign up for [employer].  For a Price Quote for your services, please call our Consumer Price Line at [phone].  As always, Thank you for trusting us with your health care needs!  Discharged by DIEGO Lyons     Statement Selected

## 2019-08-07 NOTE — PROGRESS NOTE ADULT - SUBJECTIVE AND OBJECTIVE BOX
[facility]   [phone]  RENAL FOLLOW UP NOTE  --------------------------------------------------------------------------------  Chief Complaint:    24 hour events/subjective:    seen. no worsening sob. + c/o edema     PAST HISTORY  --------------------------------------------------------------------------------  No significant changes to PMH, PSH, FHx, SHx, unless otherwise noted    ALLERGIES & MEDICATIONS  --------------------------------------------------------------------------------  Allergies    No Known Allergies    Intolerances      Standing Inpatient Medications  ALBUTerol/ipratropium for Nebulization 3 milliLiter(s) Nebulizer every 6 hours  aspirin enteric coated 81 milliGRAM(s) Oral daily  atorvastatin 20 milliGRAM(s) Oral at bedtime  BACItracin   Ointment 1 Application(s) Topical two times a day  clopidogrel Tablet 75 milliGRAM(s) Oral daily  dextrose 5%. 1000 milliLiter(s) IV Continuous <Continuous>  dextrose 50% Injectable 12.5 Gram(s) IV Push once  dextrose 50% Injectable 25 Gram(s) IV Push once  dextrose 50% Injectable 25 Gram(s) IV Push once  docusate sodium 100 milliGRAM(s) Oral three times a day  heparin  Injectable 5000 Unit(s) SubCutaneous every 12 hours  hydrALAZINE 25 milliGRAM(s) Oral three times a day  insulin glargine Injectable (LANTUS) 7 Unit(s) SubCutaneous at bedtime  insulin lispro (HumaLOG) corrective regimen sliding scale   SubCutaneous three times a day before meals  insulin lispro (HumaLOG) corrective regimen sliding scale   SubCutaneous <User Schedule>  insulin lispro (HumaLOG) corrective regimen sliding scale   SubCutaneous at bedtime  insulin lispro Injectable (HumaLOG) 2 Unit(s) SubCutaneous <User Schedule>  insulin lispro Injectable (HumaLOG) 5 Unit(s) SubCutaneous three times a day before meals  insulin lispro Injectable (HumaLOG) 4 Unit(s) SubCutaneous at bedtime  isosorbide   dinitrate Tablet (ISORDIL) 10 milliGRAM(s) Oral three times a day  metoprolol succinate ER 50 milliGRAM(s) Oral every 12 hours  multivitamin 1 Tablet(s) Oral daily  pantoprazole    Tablet 40 milliGRAM(s) Oral before breakfast    PRN Inpatient Medications  acetaminophen   Tablet .. 650 milliGRAM(s) Oral every 6 hours PRN  dextrose 40% Gel 15 Gram(s) Oral once PRN  glucagon  Injectable 1 milliGRAM(s) IntraMuscular once PRN  oxyCODONE    5 mG/acetaminophen 325 mG 2 Tablet(s) Oral every 6 hours PRN  senna 2 Tablet(s) Oral at bedtime PRN      REVIEW OF SYSTEMS  --------------------------------------------------------------------------------    Gen: denies fevers/chills,  CVS: denies chest pain/palpitations  Resp: denies SOB/Cough  GI: Denies N/V/Abd pain  : Denies dysuria    All other systems were reviewed and are negative, except as noted.    VITALS/PHYSICAL EXAM  --------------------------------------------------------------------------------  T(C): 36.4 (08-07-19 @ 15:15), Max: 36.8 (08-07-19 @ 04:54)  HR: 82 (08-07-19 @ 16:52) (80 - 90)  BP: 109/62 (08-07-19 @ 16:52) (100/59 - 135/78)  RR: 18 (08-07-19 @ 15:15) (18 - 18)  SpO2: 100% (08-07-19 @ 15:15) (96% - 100%)  Wt(kg): --        08-06-19 @ 07:01  -  08-07-19 @ 07:00  --------------------------------------------------------  IN: 1340 mL / OUT: 3100 mL / NET: -1760 mL    08-07-19 @ 07:01  -  08-07-19 @ 18:25  --------------------------------------------------------  IN: 980 mL / OUT: 2500 mL / NET: -1520 mL      Physical Exam:  	  Gen: alert oriented place person and date   	Pulm: Decreased breath sounds b/l bases no rales or ronchi  	CV: RRR, S1/S2  	Abd: +BS, soft, nontender/nondistended  	Extremity: No cyanosis, 2-3-   edema no clubbing    		      LABS/STUDIES  --------------------------------------------------------------------------------    137  |  95  |  20  ----------------------------<  229      [08-07-19 @ 06:21]  4.2   |  28  |  3.93        Ca     9.5     [08-07-19 @ 06:21]      Phos  4.4     [08-07-19 @ 06:21]            Creatinine Trend:  SCr 3.93 [08-07 @ 06:21]  SCr 4.22 [08-06 @ 06:26]  SCr 5.10 [08-05 @ 05:48]  SCr 3.69 [08-04 @ 05:23]  SCr 3.64 [08-03 @ 16:13]                  Iron 42, TIBC 253, %sat 17      [06-17-19 @ 17:54]  Ferritin 1838      [06-17-19 @ 18:06]  PTH -- (Ca 9.6)      [06-17-19 @ 18:06]   177  PTH -- (Ca 7.8)      [03-29-19 @ 20:38]   73  PTH -- (Ca 7.3)      [02-22-19 @ 02:49]   59  HbA1c 8.2      [06-16-19 @ 13:24]  TSH 0.71      [11-17-18 @ 08:25]

## 2019-08-07 NOTE — PROGRESS NOTE ADULT - ASSESSMENT
64 f with    ESRD/ fluid overload  - HD  - Nephrology follow Dr. Schmidt    DM type 1  - ADA diet  - BS control  - endocrine follow    CHF cr systolic  - continue Rx  - cardiology follow    PAT  - follow    CAD/ s/p NSTEMI  - continue Rx  - cardiology follow    COPD  - nebs    PVD  - conservative Rx    Anxiety/ Depression control    Epistaxis resolved  - ENT follow.    DCP home in am if stable.    d/w patient   Pierce Viera MD pager [phone]

## 2019-08-07 NOTE — DIETITIAN INITIAL EVALUATION ADULT. - PHYSICAL APPEARANCE
underweight/Nutrition Focused Physical Exam performed with consent of patient; exam demonstrates adequate muscle and fat stores, showing no signs of muscle wasting or fat loss indicating malnourishment at this time.

## 2019-08-07 NOTE — PROGRESS NOTE ADULT - SUBJECTIVE AND OBJECTIVE BOX
Patient is a 64y old  Female who presents with a chief complaint of sob (07 Aug 2019 18:24)      SUBJECTIVE / OVERNIGHT EVENTS: feels better.  Review of Systems  chest pain no  palpitations no  sob no  nausea no  headache no    MEDICATIONS  (STANDING):  ALBUTerol/ipratropium for Nebulization 3 milliLiter(s) Nebulizer every 6 hours  aspirin enteric coated 81 milliGRAM(s) Oral daily  atorvastatin 20 milliGRAM(s) Oral at bedtime  BACItracin   Ointment 1 Application(s) Topical two times a day  clopidogrel Tablet 75 milliGRAM(s) Oral daily  dextrose 5%. 1000 milliLiter(s) (50 mL/Hr) IV Continuous <Continuous>  dextrose 50% Injectable 12.5 Gram(s) IV Push once  dextrose 50% Injectable 25 Gram(s) IV Push once  dextrose 50% Injectable 25 Gram(s) IV Push once  docusate sodium 100 milliGRAM(s) Oral three times a day  heparin  Injectable 5000 Unit(s) SubCutaneous every 12 hours  hydrALAZINE 25 milliGRAM(s) Oral three times a day  insulin glargine Injectable (LANTUS) 7 Unit(s) SubCutaneous at bedtime  insulin lispro (HumaLOG) corrective regimen sliding scale   SubCutaneous three times a day before meals  insulin lispro (HumaLOG) corrective regimen sliding scale   SubCutaneous <User Schedule>  insulin lispro (HumaLOG) corrective regimen sliding scale   SubCutaneous at bedtime  insulin lispro Injectable (HumaLOG) 2 Unit(s) SubCutaneous <User Schedule>  insulin lispro Injectable (HumaLOG) 5 Unit(s) SubCutaneous three times a day before meals  insulin lispro Injectable (HumaLOG) 4 Unit(s) SubCutaneous at bedtime  isosorbide   dinitrate Tablet (ISORDIL) 10 milliGRAM(s) Oral three times a day  metoprolol succinate ER 50 milliGRAM(s) Oral every 12 hours  multivitamin 1 Tablet(s) Oral daily  pantoprazole    Tablet 40 milliGRAM(s) Oral before breakfast    MEDICATIONS  (PRN):  acetaminophen   Tablet .. 650 milliGRAM(s) Oral every 6 hours PRN Temp greater or equal to 38.5C (101.3F), Mild Pain (1 - 3)  dextrose 40% Gel 15 Gram(s) Oral once PRN Blood Glucose LESS THAN 70 milliGRAM(s)/deciliter  glucagon  Injectable 1 milliGRAM(s) IntraMuscular once PRN Glucose LESS THAN 70 milligrams/deciliter  oxyCODONE    5 mG/acetaminophen 325 mG 2 Tablet(s) Oral every 6 hours PRN Severe Pain (7 - 10)  senna 2 Tablet(s) Oral at bedtime PRN Constipation      Vital Signs Last 24 Hrs  T(C): 36.4 (07 Aug 2019 15:15), Max: 36.8 (07 Aug 2019 04:54)  T(F): 97.5 (07 Aug 2019 15:15), Max: 98.2 (07 Aug 2019 04:54)  HR: 82 (07 Aug 2019 16:52) (80 - 90)  BP: 109/62 (07 Aug 2019 16:52) (100/59 - 135/78)  BP(mean): --  RR: 18 (07 Aug 2019 15:15) (18 - 18)  SpO2: 100% (07 Aug 2019 15:15) (96% - 100%)    PHYSICAL EXAM:  GENERAL: NAD  HEAD:  Atraumatic, Normocephalic  EYES: EOMI, PERRLA, conjunctiva and sclera clear  NECK: Supple, No JVD  CHEST/LUNG: Clear to auscultation bilaterally; No wheeze  HEART: Regular rate and rhythm; No murmurs, rubs, or gallops  ABDOMEN: Soft, Nontender, Nondistended; Bowel sounds present  EXTREMITIES:  1+ bl edema  PSYCH: AAOx3  NEUROLOGY: non-focal  SKIN: No rashes or lesions    LABS:    08-07    137  |  95<L>  |  20  ----------------------------<  229<H>  4.2   |  28  |  3.93<H>    Ca    9.5      07 Aug 2019 06:21  Phos  4.4     08-07                  RADIOLOGY & ADDITIONAL TESTS:    Imaging Personally Reviewed:    Consultant(s) Notes Reviewed:      Care Discussed with Consultants/Other Providers:

## 2019-08-07 NOTE — DIETITIAN INITIAL EVALUATION ADULT. - ADD RECOMMEND
Continue micronutrient supplementation. Monitor pt acceptance to oral nutrition supplements to meet increased protein needs. Monitor tolerance to diet prescription, nutritional intake, weight trends, labs and skin integrity.

## 2019-08-07 NOTE — DIETITIAN INITIAL EVALUATION ADULT. - REASON INDICATOR FOR ASSESSMENT
Pt seen for length of stay on 2DSU. Information obtained from patient, previous RD notes, and comprehensive chart review.

## 2019-08-07 NOTE — PROGRESS NOTE ADULT - ASSESSMENT
65 y/o M w/h/o uncontrolled TIDM (HbA1c 8.2% 6/19) c/b neuropathy and retinopathy as well as CAD s/p multiple stents, CHF (EF 50% 10/2018), TIA, PVD s/p b/l fem-pop bypass, ESRD> HD M/T/Th/Sat, presenting with worsening SOB and LE edema. Found to have CHF exacerbation/fluid retention/hyperkalemia and thrombophlebitis. On HD with persistent edema in LEs. Glycemic control difficult to achieve secondary to unpredictable and erratic PO intake specially over night. Pt eats at bedtime and then in the middle of the night as well. Since pt has T1DM she needs to take insulin every time she eats carbs which she does during the night almost every night. BG goal at this time is to prevent hypoglycemia and/or persistent hyperglycemia. Pt is not willing to participate on DM care. At home pt's DM is managed but  and daughter since pt lacks insight regarding importance of glycemic control.

## 2019-08-07 NOTE — PROGRESS NOTE ADULT - ASSESSMENT
64 Female hx CAD (Cath Feb '19 showing 99% RCA, 100% RPLS, 100% Cx) s/p multiple stents/brachytherapy, ESRD (on HD M/T/T/Sa), DM1 c/b neuropathy and retinopathy, CHF, mod MR, severe AS, RHF, TIA, severe PVD s/p b/l fempop bypass, left subclavian vein stenosis s/p stent, COPD not on O2 pw cc of LE swelling and ct scan suggestive of CHF    1- esrd  2- hyperkalemia improved   3- pulm edema  4- htn      uf 3 hr 2 liter bfr 350    cont lisinopril and hydralazine

## 2019-08-07 NOTE — PROGRESS NOTE ADULT - PROBLEM SELECTOR PLAN 1
-test BG AC/HS/2AM  -c/w Lantus 7 units QHS  --c/w Humalog 5 units AC meals  -c/w Humalog 4 units w/HS snack  -c/w Humalog 2 units at 2am if eating at this time. HOLD IF NOT EATING!  -c/w Humalog adjusted low correction scale AC and Low HS/2AM scale (1-4 units >250mg/dl).   -f/u outpt w/Dr Ley  -dischage basal/bolus  -Plan discussed with pt/team.   pager: [phone]

## 2019-08-07 NOTE — DIETITIAN INITIAL EVALUATION ADULT. - PERTINENT LABORATORY DATA
(8/7) POCT blood glucose 148-328, Cr 3.93, Glucose 229, GFR if non- 12, (8/7) POCT blood glucose , (6/16) HgbA1c 8.2%

## 2019-08-07 NOTE — PROGRESS NOTE ADULT - SUBJECTIVE AND OBJECTIVE BOX
Cardiovascular Disease Progress Note    Overnight events: No acute events overnight. slept well. no new cardiac sx. still with paroxysmal accelerated tachycardia   Otherwise review of systems negative    Objective Findings:  T(C): 36.8 (19 @ 04:54), Max: 36.8 (19 @ 08:44)  HR: 88 (19 @ 04:54) (81 - 94)  BP: 128/69 (19 @ 04:54) (100/59 - 157/59)  RR: 18 (19 @ 04:54) (16 - 18)  SpO2: 96% (19 @ 04:54) (94% - 99%)  Wt(kg): --  Daily     Daily Weight in k.1 (06 Aug 2019 15:15)      Physical Exam:  Gen: NAD  HEENT: EOMI  CV: RRR, normal S1 + S2, no m/r/g  Lungs: CTAB  Abd: soft, non-tender  Ext: No edema    Telemetry: nsr, brief episodes of paroxysmal accelerated junctional tachycardia ~ 100-110    Laboratory Data:                        9.5    5.17  )-----------( 180      ( 05 Aug 2019 08:02 )             30.1     08-07    137  |  95<L>  |  20  ----------------------------<  229<H>  4.2   |  28  |  3.93<H>    Ca    9.5      07 Aug 2019 06:21  Phos  4.4     08-07                Inpatient Medications:  MEDICATIONS  (STANDING):  ALBUTerol/ipratropium for Nebulization 3 milliLiter(s) Nebulizer every 6 hours  aspirin enteric coated 81 milliGRAM(s) Oral daily  atorvastatin 20 milliGRAM(s) Oral at bedtime  BACItracin   Ointment 1 Application(s) Topical two times a day  clopidogrel Tablet 75 milliGRAM(s) Oral daily  dextrose 5%. 1000 milliLiter(s) (50 mL/Hr) IV Continuous <Continuous>  dextrose 50% Injectable 12.5 Gram(s) IV Push once  dextrose 50% Injectable 25 Gram(s) IV Push once  dextrose 50% Injectable 25 Gram(s) IV Push once  docusate sodium 100 milliGRAM(s) Oral three times a day  heparin  Injectable 5000 Unit(s) SubCutaneous every 12 hours  hydrALAZINE 25 milliGRAM(s) Oral three times a day  insulin glargine Injectable (LANTUS) 7 Unit(s) SubCutaneous at bedtime  insulin lispro (HumaLOG) corrective regimen sliding scale   SubCutaneous three times a day before meals  insulin lispro (HumaLOG) corrective regimen sliding scale   SubCutaneous <User Schedule>  insulin lispro (HumaLOG) corrective regimen sliding scale   SubCutaneous at bedtime  insulin lispro Injectable (HumaLOG) 2 Unit(s) SubCutaneous <User Schedule>  insulin lispro Injectable (HumaLOG) 5 Unit(s) SubCutaneous three times a day before meals  insulin lispro Injectable (HumaLOG) 4 Unit(s) SubCutaneous at bedtime  isosorbide   dinitrate Tablet (ISORDIL) 10 milliGRAM(s) Oral three times a day  metoprolol succinate ER 50 milliGRAM(s) Oral every 12 hours  multivitamin 1 Tablet(s) Oral daily  pantoprazole    Tablet 40 milliGRAM(s) Oral before breakfast      Assessment:  -chest pain with mild ekg changes and stable hs trop  -CHF exacerbation  -NSVT  -pAT  -volume overload  -ischemic cardiomyopathy, cad s/p multiple stents  -esrd on hd  -PAD s/p prior intervention         Recs:  -resolved chest pain/angina   -known extensive CAD and ICM. s/p [facility] 2019 --> severe and diffuse instent restenosis along RCA --> unable to stent due to multiple layers of stenting and prior brachytherapy  -will consider complex intervention if true ischemic and refractory sx develop   -c/w beta blockers for nsvt/pat/cad (as above).c/w  toprol 50mg bid, cont nitrates as tolerates (isordil 10mg tid)  -hx of prolonged qtc. avoid qtc prolonging meds  -s/p TTE 2019: mod-severe MR, pseudo AS (low flow-low gradient), mod-severe LV dysfunction --> recent CTS consult with dr hebert appreciated. plan is for medical management given surgical risk and patient preference  -c/w asa, plavix, statin for ischemic cardiomyopathy/CAD/PAD  -dvt ppx        Over 25 minutes spent on total encounter; more than 50% of the visit was spent counseling and/or coordinating care by the attending physician.      Julián Biswas MD   Cardiovascular Disease  [phone]

## 2019-08-07 NOTE — DIETITIAN INITIAL EVALUATION ADULT. - ENERGY NEEDS
Ht: 63 inches, Wt: 106.4 pounds (dry weight 8.7), BMI: 18.8 kg/m2, IBW: 115 pounds (+/-10%), %IBW: 92%  Skin per nursing documentation: intact, free of pressure injuries  Edema per nursing documentation: 1+ bilateral ankles  Other pertinent information: Per chart, pt is a 64 year old female with PMH of CAD (Cath Feb '19 showing 99% RCA, 100% RPLS, 100% Cx) s/p multiple stents/brachytherapy, ESRD (on HD M/T/T/Sa), DM1 c/b neuropathy and retinopathy, CHF, mod MR, severe AS, RHF, TIA, severe PVD s/p b/l fempop bypass, left subclavian vein stenosis s/p stent, COPD not on O2 pw cc of LE swelling. Pt on HD in-house. Endocrine following for T1DM, cardiology following as well.

## 2019-08-08 ENCOUNTER — TRANSCRIPTION ENCOUNTER (OUTPATIENT)
Age: 64
End: 2019-08-08

## 2019-08-08 VITALS
DIASTOLIC BLOOD PRESSURE: 67 MMHG | SYSTOLIC BLOOD PRESSURE: 128 MMHG | RESPIRATION RATE: 16 BRPM | TEMPERATURE: 98 F | HEART RATE: 84 BPM | OXYGEN SATURATION: 97 %

## 2019-08-08 LAB
ANION GAP SERPL CALC-SCNC: 20 MMOL/L — HIGH (ref 5–17)
BUN SERPL-MCNC: 39 MG/DL — HIGH (ref 7–23)
CALCIUM SERPL-MCNC: 8.9 MG/DL — SIGNIFICANT CHANGE UP (ref 8.4–10.5)
CHLORIDE SERPL-SCNC: 92 MMOL/L — LOW (ref 96–108)
CO2 SERPL-SCNC: 25 MMOL/L — SIGNIFICANT CHANGE UP (ref 22–31)
CREAT SERPL-MCNC: 5.48 MG/DL — HIGH (ref 0.5–1.3)
GLUCOSE BLDC GLUCOMTR-MCNC: 186 MG/DL — HIGH (ref 70–99)
GLUCOSE BLDC GLUCOMTR-MCNC: 194 MG/DL — HIGH (ref 70–99)
GLUCOSE BLDC GLUCOMTR-MCNC: 203 MG/DL — HIGH (ref 70–99)
GLUCOSE BLDC GLUCOMTR-MCNC: 247 MG/DL — HIGH (ref 70–99)
GLUCOSE BLDC GLUCOMTR-MCNC: 285 MG/DL — HIGH (ref 70–99)
GLUCOSE BLDC GLUCOMTR-MCNC: 323 MG/DL — HIGH (ref 70–99)
GLUCOSE SERPL-MCNC: 225 MG/DL — HIGH (ref 70–99)
HCT VFR BLD CALC: 28.1 % — LOW (ref 34.5–45)
HGB BLD-MCNC: 8.8 G/DL — LOW (ref 11.5–15.5)
MCHC RBC-ENTMCNC: 31.3 GM/DL — LOW (ref 32–36)
MCHC RBC-ENTMCNC: 33.5 PG — SIGNIFICANT CHANGE UP (ref 27–34)
MCV RBC AUTO: 106.8 FL — HIGH (ref 80–100)
PLATELET # BLD AUTO: 182 K/UL — SIGNIFICANT CHANGE UP (ref 150–400)
POTASSIUM SERPL-MCNC: 4.8 MMOL/L — SIGNIFICANT CHANGE UP (ref 3.5–5.3)
POTASSIUM SERPL-SCNC: 4.8 MMOL/L — SIGNIFICANT CHANGE UP (ref 3.5–5.3)
RBC # BLD: 2.63 M/UL — LOW (ref 3.8–5.2)
RBC # FLD: 13.2 % — SIGNIFICANT CHANGE UP (ref 10.3–14.5)
SODIUM SERPL-SCNC: 137 MMOL/L — SIGNIFICANT CHANGE UP (ref 135–145)
WBC # BLD: 4.21 K/UL — SIGNIFICANT CHANGE UP (ref 3.8–10.5)
WBC # FLD AUTO: 4.21 K/UL — SIGNIFICANT CHANGE UP (ref 3.8–10.5)

## 2019-08-08 PROCEDURE — 73060 X-RAY EXAM OF HUMERUS: CPT

## 2019-08-08 PROCEDURE — 74177 CT ABD & PELVIS W/CONTRAST: CPT

## 2019-08-08 PROCEDURE — 82803 BLOOD GASES ANY COMBINATION: CPT

## 2019-08-08 PROCEDURE — 84100 ASSAY OF PHOSPHORUS: CPT

## 2019-08-08 PROCEDURE — 82435 ASSAY OF BLOOD CHLORIDE: CPT

## 2019-08-08 PROCEDURE — 83605 ASSAY OF LACTIC ACID: CPT

## 2019-08-08 PROCEDURE — 82553 CREATINE MB FRACTION: CPT

## 2019-08-08 PROCEDURE — 82550 ASSAY OF CK (CPK): CPT

## 2019-08-08 PROCEDURE — 80048 BASIC METABOLIC PNL TOTAL CA: CPT

## 2019-08-08 PROCEDURE — 99285 EMERGENCY DEPT VISIT HI MDM: CPT

## 2019-08-08 PROCEDURE — 82947 ASSAY GLUCOSE BLOOD QUANT: CPT

## 2019-08-08 PROCEDURE — 85730 THROMBOPLASTIN TIME PARTIAL: CPT

## 2019-08-08 PROCEDURE — 86704 HEP B CORE ANTIBODY TOTAL: CPT

## 2019-08-08 PROCEDURE — 80053 COMPREHEN METABOLIC PANEL: CPT

## 2019-08-08 PROCEDURE — 84295 ASSAY OF SERUM SODIUM: CPT

## 2019-08-08 PROCEDURE — 83690 ASSAY OF LIPASE: CPT

## 2019-08-08 PROCEDURE — 82010 KETONE BODYS QUAN: CPT

## 2019-08-08 PROCEDURE — 84132 ASSAY OF SERUM POTASSIUM: CPT

## 2019-08-08 PROCEDURE — 87340 HEPATITIS B SURFACE AG IA: CPT

## 2019-08-08 PROCEDURE — 71046 X-RAY EXAM CHEST 2 VIEWS: CPT

## 2019-08-08 PROCEDURE — 85610 PROTHROMBIN TIME: CPT

## 2019-08-08 PROCEDURE — 83880 ASSAY OF NATRIURETIC PEPTIDE: CPT

## 2019-08-08 PROCEDURE — 99232 SBSQ HOSP IP/OBS MODERATE 35: CPT

## 2019-08-08 PROCEDURE — 94640 AIRWAY INHALATION TREATMENT: CPT

## 2019-08-08 PROCEDURE — 99261: CPT

## 2019-08-08 PROCEDURE — 86706 HEP B SURFACE ANTIBODY: CPT

## 2019-08-08 PROCEDURE — 82330 ASSAY OF CALCIUM: CPT

## 2019-08-08 PROCEDURE — 86803 HEPATITIS C AB TEST: CPT

## 2019-08-08 PROCEDURE — 83735 ASSAY OF MAGNESIUM: CPT

## 2019-08-08 PROCEDURE — 93005 ELECTROCARDIOGRAM TRACING: CPT

## 2019-08-08 PROCEDURE — 82962 GLUCOSE BLOOD TEST: CPT

## 2019-08-08 PROCEDURE — 85027 COMPLETE CBC AUTOMATED: CPT

## 2019-08-08 PROCEDURE — 85014 HEMATOCRIT: CPT

## 2019-08-08 PROCEDURE — 84484 ASSAY OF TROPONIN QUANT: CPT

## 2019-08-08 RX ORDER — INSULIN GLARGINE 100 [IU]/ML
0 INJECTION, SOLUTION SUBCUTANEOUS
Qty: 0 | Refills: 0 | DISCHARGE

## 2019-08-08 RX ORDER — SENNA PLUS 8.6 MG/1
2 TABLET ORAL
Qty: 0 | Refills: 0 | DISCHARGE
Start: 2019-08-08

## 2019-08-08 RX ORDER — METOPROLOL TARTRATE 50 MG
1 TABLET ORAL
Qty: 60 | Refills: 0
Start: 2019-08-08 | End: 2019-09-06

## 2019-08-08 RX ORDER — ISOSORBIDE DINITRATE 5 MG/1
1 TABLET ORAL
Qty: 90 | Refills: 0
Start: 2019-08-08 | End: 2019-09-06

## 2019-08-08 RX ORDER — DOCUSATE SODIUM 100 MG
1 CAPSULE ORAL
Qty: 0 | Refills: 0 | DISCHARGE
Start: 2019-08-08

## 2019-08-08 RX ORDER — INSULIN LISPRO 100/ML
VIAL (ML) SUBCUTANEOUS
Refills: 0 | Status: DISCONTINUED | OUTPATIENT
Start: 2019-08-08 | End: 2019-08-08

## 2019-08-08 RX ADMIN — Medication 2: at 09:15

## 2019-08-08 RX ADMIN — OXYCODONE AND ACETAMINOPHEN 2 TABLET(S): 5; 325 TABLET ORAL at 15:56

## 2019-08-08 RX ADMIN — PANTOPRAZOLE SODIUM 40 MILLIGRAM(S): 20 TABLET, DELAYED RELEASE ORAL at 05:21

## 2019-08-08 RX ADMIN — Medication 2: at 13:26

## 2019-08-08 RX ADMIN — Medication 5 UNIT(S): at 13:26

## 2019-08-08 RX ADMIN — ISOSORBIDE DINITRATE 10 MILLIGRAM(S): 5 TABLET ORAL at 05:21

## 2019-08-08 RX ADMIN — Medication 100 MILLIGRAM(S): at 05:21

## 2019-08-08 RX ADMIN — Medication 2: at 02:15

## 2019-08-08 RX ADMIN — Medication 5 UNIT(S): at 09:16

## 2019-08-08 RX ADMIN — CLOPIDOGREL BISULFATE 75 MILLIGRAM(S): 75 TABLET, FILM COATED ORAL at 11:07

## 2019-08-08 RX ADMIN — Medication 50 MILLIGRAM(S): at 05:21

## 2019-08-08 RX ADMIN — Medication 50 MILLIGRAM(S): at 17:02

## 2019-08-08 RX ADMIN — Medication 25 MILLIGRAM(S): at 05:21

## 2019-08-08 RX ADMIN — Medication 81 MILLIGRAM(S): at 11:07

## 2019-08-08 RX ADMIN — Medication 1 TABLET(S): at 11:07

## 2019-08-08 NOTE — PROGRESS NOTE ADULT - SUBJECTIVE AND OBJECTIVE BOX
[facility]   [phone]  DIALYSIS NOTE  Chief Complaint: ESRD/Ongoing hemodialysis requirement    24 hour events/subjective:    seen earlier. states edema is improving         ALLERGIES & MEDICATIONS  --------------------------------------------------------------------------------  Allergies    No Known Allergies    Intolerances      Standing Inpatient Medications  ALBUTerol/ipratropium for Nebulization 3 milliLiter(s) Nebulizer every 6 hours  aspirin enteric coated 81 milliGRAM(s) Oral daily  atorvastatin 20 milliGRAM(s) Oral at bedtime  BACItracin   Ointment 1 Application(s) Topical two times a day  clopidogrel Tablet 75 milliGRAM(s) Oral daily  dextrose 5%. 1000 milliLiter(s) IV Continuous <Continuous>  dextrose 50% Injectable 12.5 Gram(s) IV Push once  dextrose 50% Injectable 25 Gram(s) IV Push once  dextrose 50% Injectable 25 Gram(s) IV Push once  docusate sodium 100 milliGRAM(s) Oral three times a day  heparin  Injectable 5000 Unit(s) SubCutaneous every 12 hours  hydrALAZINE 25 milliGRAM(s) Oral three times a day  insulin glargine Injectable (LANTUS) 7 Unit(s) SubCutaneous at bedtime  insulin lispro (HumaLOG) corrective regimen sliding scale   SubCutaneous three times a day before meals  insulin lispro (HumaLOG) corrective regimen sliding scale   SubCutaneous <User Schedule>  insulin lispro (HumaLOG) corrective regimen sliding scale   SubCutaneous at bedtime  insulin lispro Injectable (HumaLOG) 2 Unit(s) SubCutaneous <User Schedule>  insulin lispro Injectable (HumaLOG) 5 Unit(s) SubCutaneous three times a day before meals  insulin lispro Injectable (HumaLOG) 4 Unit(s) SubCutaneous at bedtime  isosorbide   dinitrate Tablet (ISORDIL) 10 milliGRAM(s) Oral three times a day  metoprolol succinate ER 50 milliGRAM(s) Oral every 12 hours  multivitamin 1 Tablet(s) Oral daily  pantoprazole    Tablet 40 milliGRAM(s) Oral before breakfast    PRN Inpatient Medications  acetaminophen   Tablet .. 650 milliGRAM(s) Oral every 6 hours PRN  dextrose 40% Gel 15 Gram(s) Oral once PRN  glucagon  Injectable 1 milliGRAM(s) IntraMuscular once PRN  oxyCODONE    5 mG/acetaminophen 325 mG 2 Tablet(s) Oral every 6 hours PRN  senna 2 Tablet(s) Oral at bedtime PRN      REVIEW OF SYSTEMS  --------------------------------------------------------------------------------  no itching or rash  no fever or chill  no cp or palp   no sob or cough   no N/V/D/ no abd pain   ext +  edema no  pain         VITALS/PHYSICAL EXAM  --------------------------------------------------------------------------------  T(C): 36.6 (08-08-19 @ 16:23), Max: 36.8 (08-08-19 @ 04:30)  HR: 84 (08-08-19 @ 16:23) (71 - 84)  BP: 128/67 (08-08-19 @ 16:23) (116/65 - 135/69)  RR: 16 (08-08-19 @ 16:23) (16 - 18)  SpO2: 97% (08-08-19 @ 16:23) (97% - 100%)  Wt(kg): --        08-07-19 @ 07:01  -  08-08-19 @ 07:00  --------------------------------------------------------  IN: 1280 mL / OUT: 2500 mL / NET: -1220 mL    08-08-19 @ 07:01  -  08-08-19 @ 18:53  --------------------------------------------------------  IN: 500 mL / OUT: 2800 mL / NET: -2300 mL      Physical Exam:  		  Gen: alert oriented place person and date   	Pulm: Decreased breath sounds b/l bases no rales or ronchi  	CV: RRR, S1/S2  	Abd: +BS, soft, nontender/nondistended  	Extremity: No cyanosis, 2- improving   edema no clubbing  	  LABS/STUDIES  --------------------------------------------------------------------------------              8.8    4.21  >-----------<  182      [08-08-19 @ 09:27]              28.1     137  |  92  |  39  ----------------------------<  225      [08-08-19 @ 06:11]  4.8   |  25  |  5.48        Ca     8.9     [08-08-19 @ 06:11]      Phos  4.4     [08-07-19 @ 06:21]                    imp/suggest: ESRD      Hemodialysis Prescription:  	Access:  	Dialyzer: revaclear   	Blood Flow (mL/Min): 400  	Dialysate Flow (mL/Min): 600  	Target UF (Liters):  	Treatment Time:  	Potassium:   	Calcium: 2.5  	  YOLANDA    Vitamin D     continue with hd   see hd flow sheet

## 2019-08-08 NOTE — PROGRESS NOTE ADULT - ASSESSMENT
65 y/o M w/h/o uncontrolled TIDM (HbA1c 8.2% 6/19) c/b neuropathy and retinopathy as well as CAD s/p multiple stents, CHF (EF 50% 10/2018), TIA, PVD s/p b/l fem-pop bypass, ESRD> HD M/T/Th/Sat, presenting with worsening SOB and LE edema. Found to have CHF exacerbation/fluid retention/hyperkalemia and thrombophlebitis. On HD with persistent edema in LEs. Glycemic control difficult to achieve secondary to unpredictable and erratic PO intake specially over night. Pt eats at bedtime and then in the middle of the night as well. Since pt has T1DM she needs to take insulin every time she eats carbs which she does during the night almost every night. BG goal at this time is to prevent hypoglycemia and/or persistent hyperglycemia. At home pt's DM is managed but  and daughter since pt lacks insight regarding importance of glycemic control.

## 2019-08-08 NOTE — DISCHARGE NOTE PROVIDER - NSDCCPCAREPLAN_GEN_ALL_CORE_FT
PRINCIPAL DISCHARGE DIAGNOSIS  Diagnosis: Hypervolemia, unspecified hypervolemia type  Assessment and Plan of Treatment: Continue all medications as ordered, Follow up with Renal and cardiology in 2 weeks      SECONDARY DISCHARGE DIAGNOSES  Diagnosis: Type 1 diabetes mellitus with hyperglycemia, with long-term current use of insulin  Assessment and Plan of Treatment: HgA1C this admission.  Make sure you get your HgA1c checked every three months.  If you take oral diabetes medications, check your blood glucose two times a day.  If you take insulin, check your blood glucose before meals and at bedtime.  It's important not to skip any meals.  Keep a log of your blood glucose results and always take it with you to your doctor appointments.  Keep a list of your current medications including injectables and over the counter medications and bring this medication list with you to all your doctor appointments.  If you have not seen your ophthalmologist this year call for appointment.  Check your feet daily for redness, sores, or openings. Do not self treat. If no improvement in two days call your primary care physician for an appointment.  Low blood sugar (hypoglycemia) is a blood sugar below 70mg/dl. Check your blood sugar if you feel signs/symptoms of hypoglycemia. If your blood sugar is below 70 take 15 grams of carbohydrates (ex 4 oz of apple juice, 3-4 glucose tablets, or 4-6 oz of regular soda) wait 15 minutes and repeat blood sugar to make sure it comes up above 70.  If your blood sugar is above 70 and you are due for a meal, have a meal.  If you are not due for a meal have a snack.  This snack helps keeps your blood sugar at a safe range.    Diagnosis: Essential hypertension  Assessment and Plan of Treatment: Low salt diet  Activity as tolerated.  Take all medication as prescribed.  Follow up with your medical doctor for routine blood pressure monitoring at your next visit.  Notify your doctor if you have any of the following symptoms:   Dizziness, Lightheadedness, Blurry vision, Headache, Chest pain, Shortness of breath    Diagnosis: ESRD (end stage renal disease) on dialysis  Assessment and Plan of Treatment: Follow up with Renal md in 2 weeks PRINCIPAL DISCHARGE DIAGNOSIS  Diagnosis: Hypervolemia, unspecified hypervolemia type  Assessment and Plan of Treatment: Continue all medications as ordered, Follow up with Renal and cardiology in 2 weeks  DC home after HD. Follow with PMD/ Cardiology/ Nephrology in 2-3 days.        SECONDARY DISCHARGE DIAGNOSES  Diagnosis: Type 1 diabetes mellitus with hyperglycemia, with long-term current use of insulin  Assessment and Plan of Treatment: HgA1C this admission.  Make sure you get your HgA1c checked every three months.  If you take oral diabetes medications, check your blood glucose two times a day.  If you take insulin, check your blood glucose before meals and at bedtime.  It's important not to skip any meals.  Keep a log of your blood glucose results and always take it with you to your doctor appointments.  Keep a list of your current medications including injectables and over the counter medications and bring this medication list with you to all your doctor appointments.  If you have not seen your ophthalmologist this year call for appointment.  Check your feet daily for redness, sores, or openings. Do not self treat. If no improvement in two days call your primary care physician for an appointment.  Low blood sugar (hypoglycemia) is a blood sugar below 70mg/dl. Check your blood sugar if you feel signs/symptoms of hypoglycemia. If your blood sugar is below 70 take 15 grams of carbohydrates (ex 4 oz of apple juice, 3-4 glucose tablets, or 4-6 oz of regular soda) wait 15 minutes and repeat blood sugar to make sure it comes up above 70.  If your blood sugar is above 70 and you are due for a meal, have a meal.  If you are not due for a meal have a snack.  This snack helps keeps your blood sugar at a safe range.    Diagnosis: Essential hypertension  Assessment and Plan of Treatment: Low salt diet  Activity as tolerated.  Take all medication as prescribed.  Follow up with your medical doctor for routine blood pressure monitoring at your next visit.  Notify your doctor if you have any of the following symptoms:   Dizziness, Lightheadedness, Blurry vision, Headache, Chest pain, Shortness of breath    Diagnosis: ESRD (end stage renal disease) on dialysis  Assessment and Plan of Treatment: Follow up with Renal md in 2 weeks

## 2019-08-08 NOTE — PROGRESS NOTE ADULT - SUBJECTIVE AND OBJECTIVE BOX
Patient is a 64y old  Female who presents with a chief complaint of sob (08 Aug 2019 10:19)      SUBJECTIVE / OVERNIGHT EVENTS: Comfortable without new complaints. Wants to go home.  Review of Systems  chest pain no  palpitations no  sob no  nausea no  headache no    MEDICATIONS  (STANDING):  ALBUTerol/ipratropium for Nebulization 3 milliLiter(s) Nebulizer every 6 hours  aspirin enteric coated 81 milliGRAM(s) Oral daily  atorvastatin 20 milliGRAM(s) Oral at bedtime  BACItracin   Ointment 1 Application(s) Topical two times a day  clopidogrel Tablet 75 milliGRAM(s) Oral daily  dextrose 5%. 1000 milliLiter(s) (50 mL/Hr) IV Continuous <Continuous>  dextrose 50% Injectable 12.5 Gram(s) IV Push once  dextrose 50% Injectable 25 Gram(s) IV Push once  dextrose 50% Injectable 25 Gram(s) IV Push once  docusate sodium 100 milliGRAM(s) Oral three times a day  heparin  Injectable 5000 Unit(s) SubCutaneous every 12 hours  hydrALAZINE 25 milliGRAM(s) Oral three times a day  insulin glargine Injectable (LANTUS) 7 Unit(s) SubCutaneous at bedtime  insulin lispro (HumaLOG) corrective regimen sliding scale   SubCutaneous three times a day before meals  insulin lispro (HumaLOG) corrective regimen sliding scale   SubCutaneous <User Schedule>  insulin lispro (HumaLOG) corrective regimen sliding scale   SubCutaneous at bedtime  insulin lispro Injectable (HumaLOG) 2 Unit(s) SubCutaneous <User Schedule>  insulin lispro Injectable (HumaLOG) 5 Unit(s) SubCutaneous three times a day before meals  insulin lispro Injectable (HumaLOG) 4 Unit(s) SubCutaneous at bedtime  isosorbide   dinitrate Tablet (ISORDIL) 10 milliGRAM(s) Oral three times a day  metoprolol succinate ER 50 milliGRAM(s) Oral every 12 hours  multivitamin 1 Tablet(s) Oral daily  pantoprazole    Tablet 40 milliGRAM(s) Oral before breakfast    MEDICATIONS  (PRN):  acetaminophen   Tablet .. 650 milliGRAM(s) Oral every 6 hours PRN Temp greater or equal to 38.5C (101.3F), Mild Pain (1 - 3)  dextrose 40% Gel 15 Gram(s) Oral once PRN Blood Glucose LESS THAN 70 milliGRAM(s)/deciliter  glucagon  Injectable 1 milliGRAM(s) IntraMuscular once PRN Glucose LESS THAN 70 milligrams/deciliter  oxyCODONE    5 mG/acetaminophen 325 mG 2 Tablet(s) Oral every 6 hours PRN Severe Pain (7 - 10)  senna 2 Tablet(s) Oral at bedtime PRN Constipation      Vital Signs Last 24 Hrs  T(C): 36.6 (08 Aug 2019 12:15), Max: 36.8 (08 Aug 2019 04:30)  T(F): 97.9 (08 Aug 2019 12:15), Max: 98.3 (08 Aug 2019 04:30)  HR: 76 (08 Aug 2019 12:15) (75 - 82)  BP: 126/71 (08 Aug 2019 12:15) (108/82 - 135/78)  BP(mean): --  RR: 18 (08 Aug 2019 12:15) (16 - 18)  SpO2: 98% (08 Aug 2019 12:15) (98% - 100%)    PHYSICAL EXAM:  GENERAL: NAD, well-developed  HEAD:  Atraumatic, Normocephalic  EYES: EOMI, PERRLA, conjunctiva and sclera clear  NECK: Supple, No JVD  CHEST/LUNG: Clear to auscultation bilaterally; No wheeze  HEART: Regular rate and rhythm; No murmurs, rubs, or gallops  ABDOMEN: Soft, Nontender, Nondistended; Bowel sounds present  EXTREMITIES: 1+ bl edema  PSYCH: AAOx3  NEUROLOGY: non-focal  SKIN: No rashes or lesions    LABS:                        8.8    4.21  )-----------( 182      ( 08 Aug 2019 09:27 )             28.1     08-08    137  |  92<L>  |  39<H>  ----------------------------<  225<H>  4.8   |  25  |  5.48<H>    Ca    8.9      08 Aug 2019 06:11  Phos  4.4     08-07                  RADIOLOGY & ADDITIONAL TESTS:    Imaging Personally Reviewed:    Consultant(s) Notes Reviewed:      Care Discussed with Consultants/Other Providers:

## 2019-08-08 NOTE — PROVIDER CONTACT NOTE (OTHER) - ACTION/TREATMENT ORDERED:
NP Selma spoke with endocrine, insulin scale adjusted, insulin provided to pt, will continue to follow current medical plan.
Continue to monitor
Continue to monitor will come to bedside
Give coverage insulin. Ordered 6 additional units of insulin. Continue to monitor
Give pt snack, recheck sugar in 30 min, continue to monitor
Noted.  Continue to monitor at this time
PA notified and aware. Will recheck blood glucose at 4:30am as per PA.
STAT BMP & Mag, continue to monitor.
STAT EKG, report changes in patient condition, continue to monitor. will follow up with further interventions as necessary.
Stat EKG, stat troponin. Continue to monitor patient and reassess.
repeate BS in 15 min, give pt apple juice, and snack- continue to monitor

## 2019-08-08 NOTE — PROGRESS NOTE ADULT - SUBJECTIVE AND OBJECTIVE BOX
Cardiovascular Disease Progress Note    Overnight events: No acute events overnight.  remains in nsr without events. no cp/sob/palps/dizziness/edema  Otherwise review of systems negative    Objective Findings:  T(C): 36.8 (19 @ 04:30), Max: 36.8 (19 @ 04:30)  HR: 75 (19 @ 04:30) (75 - 82)  BP: 116/65 (19 @ 04:30) (108/82 - 135/78)  RR: 16 (19 @ 04:30) (16 - 18)  SpO2: 98% (19 @ 04:30) (98% - 100%)  Wt(kg): --  Daily     Daily Weight in k.2 (07 Aug 2019 16:25)      Physical Exam:  Gen: NAD  HEENT: EOMI  CV: RRR, normal S1 + S2, no m/r/g  Lungs: CTAB  Abd: soft, non-tender  Ext: No edema    Telemetry: nsr    Laboratory Data:        137  |  92<L>  |  39<H>  ----------------------------<  225<H>  4.8   |  25  |  5.48<H>    Ca    8.9      08 Aug 2019 06:11  Phos  4.4     08-07                Inpatient Medications:  MEDICATIONS  (STANDING):  ALBUTerol/ipratropium for Nebulization 3 milliLiter(s) Nebulizer every 6 hours  aspirin enteric coated 81 milliGRAM(s) Oral daily  atorvastatin 20 milliGRAM(s) Oral at bedtime  BACItracin   Ointment 1 Application(s) Topical two times a day  clopidogrel Tablet 75 milliGRAM(s) Oral daily  dextrose 5%. 1000 milliLiter(s) (50 mL/Hr) IV Continuous <Continuous>  dextrose 50% Injectable 12.5 Gram(s) IV Push once  dextrose 50% Injectable 25 Gram(s) IV Push once  dextrose 50% Injectable 25 Gram(s) IV Push once  docusate sodium 100 milliGRAM(s) Oral three times a day  heparin  Injectable 5000 Unit(s) SubCutaneous every 12 hours  hydrALAZINE 25 milliGRAM(s) Oral three times a day  insulin glargine Injectable (LANTUS) 7 Unit(s) SubCutaneous at bedtime  insulin lispro (HumaLOG) corrective regimen sliding scale   SubCutaneous three times a day before meals  insulin lispro (HumaLOG) corrective regimen sliding scale   SubCutaneous <User Schedule>  insulin lispro (HumaLOG) corrective regimen sliding scale   SubCutaneous at bedtime  insulin lispro Injectable (HumaLOG) 2 Unit(s) SubCutaneous <User Schedule>  insulin lispro Injectable (HumaLOG) 5 Unit(s) SubCutaneous three times a day before meals  insulin lispro Injectable (HumaLOG) 4 Unit(s) SubCutaneous at bedtime  isosorbide   dinitrate Tablet (ISORDIL) 10 milliGRAM(s) Oral three times a day  metoprolol succinate ER 50 milliGRAM(s) Oral every 12 hours  multivitamin 1 Tablet(s) Oral daily  pantoprazole    Tablet 40 milliGRAM(s) Oral before breakfast      Assessment:  -chest pain with mild ekg changes and stable hs trop  -CHF exacerbation  -NSVT  -pAT  -volume overload  -ischemic cardiomyopathy, cad s/p multiple stents  -esrd on hd  -PAD s/p prior intervention         Recs:  -resolved chest pain/angina   -known extensive CAD and ICM. s/p [facility] 2019 --> severe and diffuse instent restenosis along RCA --> unable to stent due to multiple layers of stenting and prior brachytherapy  -will consider complex intervention if true ischemic and refractory sx develop   -c/w beta blockers for nsvt/pat/cad (as above).c/w  toprol 50mg bid, cont nitrates as tolerates (isordil 10mg tid)  -hx of prolonged qtc. avoid qtc prolonging meds  -s/p TTE 2019: mod-severe MR, pseudo AS (low flow-low gradient), mod-severe LV dysfunction --> recent CTS consult with dr hebert appreciated. plan is for medical management given surgical risk and patient preference  -c/w asa, plavix, statin for ischemic cardiomyopathy/CAD/PAD  -dvt ppx  -dispo planning        Over 25 minutes spent on total encounter; more than 50% of the visit was spent counseling and/or coordinating care by the attending physician.      Julián Biswas MD   Cardiovascular Disease  [phone]

## 2019-08-08 NOTE — PROGRESS NOTE ADULT - PROBLEM SELECTOR PROBLEM 1
Type 1 diabetes mellitus with hyperglycemia, with long-term current use of insulin
Type 1 diabetes mellitus with hyperglycemia, with long-term current use of insulin
ESRD (end stage renal disease)
ESRD (end stage renal disease)
Epistaxis
Type 1 diabetes mellitus with hyperglycemia, with long-term current use of insulin
yes

## 2019-08-08 NOTE — PROGRESS NOTE ADULT - ASSESSMENT
64 Female hx CAD (Cath Feb '19 showing 99% RCA, 100% RPLS, 100% Cx) s/p multiple stents/brachytherapy, ESRD (on HD M/T/T/Sa), DM1 c/b neuropathy and retinopathy, CHF, mod MR, severe AS, RHF, TIA, severe PVD s/p b/l fempop bypass, left subclavian vein stenosis s/p stent, COPD not on O2 pw cc of LE swelling and ct scan suggestive of CHF    1- esrd  2- hyperkalemia improved   3- pulm edema  4- htn      hd 3.5 hr 2.2 liter 2 k bfr 400 dfr 600   fluid status improving

## 2019-08-08 NOTE — DISCHARGE NOTE PROVIDER - NSDCFUSCHEDAPPT_GEN_ALL_CORE_FT
NATHAN MEEKS ; 08/13/2019 ; NP [phone] [facility]  NATHAN MEEKS ; 08/23/2019 ; NP [address] NATHAN MEEKS ; 08/13/2019 ; NP [phone] [facility]  NATHAN MEEKS ; 08/23/2019 ; NP [address] NATHAN MEEKS ; 08/13/2019 ; NP [phone] [facility]  NATHAN MEEKS ; 08/23/2019 ; NP [address]

## 2019-08-08 NOTE — DISCHARGE NOTE NURSING/CASE MANAGEMENT/SOCIAL WORK - NSDCDPATPORTLINK_GEN_ALL_CORE
You can access the [facility], offered by [facility], by registering with the following website: [email or web address]

## 2019-08-08 NOTE — DISCHARGE NOTE PROVIDER - CARE PROVIDER_API CALL
Julián Biswas)  Internal Medicine  [address] [place] [address]  Phone: [phone]  Fax: [phone]  Follow Up Time: 2 weeks    Daisy Burger (DO)  Nephrology  [address] [address]  Phone: [phone]  Fax: [phone]  Follow Up Time: 2 weeks    Pina Ley)  EndocrinologyMetabDiabetes  [address] [place] [address]  Phone: [phone]  Fax: [phone]  Follow Up Time: 1 week

## 2019-08-08 NOTE — PROGRESS NOTE ADULT - REASON FOR ADMISSION
sob

## 2019-08-08 NOTE — DISCHARGE NOTE PROVIDER - PROVIDER TOKENS
PROVIDER:[TOKEN:[[id number]],FOLLOWUP:[2 weeks]],PROVIDER:[TOKEN:[[id number]],FOLLOWUP:[2 weeks]],PROVIDER:[TOKEN:[[id number]],FOLLOWUP:[1 week]]

## 2019-08-08 NOTE — PROGRESS NOTE ADULT - PROVIDER SPECIALTY LIST ADULT
Cardiology
ENT
Endocrinology
Internal Medicine
Nephrology
Internal Medicine
Endocrinology
Nephrology
Cardiology
Internal Medicine
Internal Medicine
Endocrinology
Endocrinology

## 2019-08-08 NOTE — PROVIDER CONTACT NOTE (OTHER) - SITUATION
Pt given 2 units of insulin based off incorrect FS reading of 203. Pt should have not received any insulin at this time.

## 2019-08-08 NOTE — PROGRESS NOTE ADULT - PROBLEM SELECTOR PLAN 1
-test BG AC/HS/2AM  -c/w Lantus 7 units QHS  -c/w Humalog 5 units AC meals and 4 units w/HS snack, 2 units if eating in middle of the night.   -Changed Humalog to modified correction scale AC (1-4) and Low HS scale/2AM scale  Plan to discharge on basal/bolus therapy. Lantus 7 units q hs. If FBG <100s decrease dose to 5 units. Humalog 5 units ac meals. Pt aware of the need to adjust according to PO intake. Pt is unable to do this independently but  and daughter/son manage DM at home.   -Pt to f/u with Dr Ley as out pt.   -discussed w/pt and team  pager: [phone]

## 2019-08-08 NOTE — PROGRESS NOTE ADULT - SUBJECTIVE AND OBJECTIVE BOX
Diabetes Follow up note:  Interval Hx:  63y/o M w/h/o uncontrolled TIDM (HbA1c 8.2% 6/19) c/b neuropathy and retinopathy as well as CAD s/p multiple stents, CHF (EF 50% 10/2018), TIA, PVD s/p b/l fem-pop bypass, ESRD> HD M/T/Th/Sat, presenting with worsening SOB and LE edema and hyperglycemia. Glucose values 200-300s over past 24 hours. Pt reports very good appetite, but upset she didn't receive all the items she wanted on her breakfast tray this morning. Pt continues to endorse snacking all the time. Plan for discharge this afternoon after HD session.     Review of Systems:  General: "I feel better and I want to go home"  GI: Tolerating POs without any N/V/D/ABD PAIN.  CV: No CP/SOB  ENDO: No S&Sx of hypoglycemia  MEDS:  atorvastatin 20 milliGRAM(s) Oral at bedtime    insulin glargine Injectable (LANTUS) 7 Unit(s) SubCutaneous at bedtime  insulin lispro (HumaLOG) corrective regimen sliding scale   SubCutaneous three times a day before meals  insulin lispro (HumaLOG) corrective regimen sliding scale   SubCutaneous <User Schedule>  insulin lispro (HumaLOG) corrective regimen sliding scale   SubCutaneous at bedtime  insulin lispro Injectable (HumaLOG) 2 Unit(s) SubCutaneous <User Schedule>  insulin lispro Injectable (HumaLOG) 5 Unit(s) SubCutaneous three times a day before meals  insulin lispro Injectable (HumaLOG) 4 Unit(s) SubCutaneous at bedtime      Allergies    No Known Allergies        PE:  General: Female sitting in chair. NAD.   Vital Signs Last 24 Hrs  T(C): 36.8 (08 Aug 2019 04:30), Max: 36.8 (08 Aug 2019 04:30)  T(F): 98.3 (08 Aug 2019 04:30), Max: 98.3 (08 Aug 2019 04:30)  HR: 75 (08 Aug 2019 04:30) (75 - 82)  BP: 116/65 (08 Aug 2019 04:30) (108/82 - 135/78)  BP(mean): --  RR: 16 (08 Aug 2019 04:30) (16 - 18)  SpO2: 98% (08 Aug 2019 04:30) (98% - 100%)  Abd: Soft, NT,ND,   Extremities: Warm. + LE edema improving  Neuro: A&O X3    LABS:    POCT Blood Glucose.: 323 mg/dL (08-08-19 @ 09:04)  POCT Blood Glucose.: 247 mg/dL (08-08-19 @ 06:57)  POCT Blood Glucose.: 194 mg/dL (08-08-19 @ 04:35)  POCT Blood Glucose.: 203 mg/dL (08-08-19 @ 02:02)  POCT Blood Glucose.: 389 mg/dL (08-07-19 @ 21:47)  POCT Blood Glucose.: 366 mg/dL (08-07-19 @ 17:56)  POCT Blood Glucose.: 308 mg/dL (08-07-19 @ 12:23)  POCT Blood Glucose.: 328 mg/dL (08-07-19 @ 08:40)  POCT Blood Glucose.: 148 mg/dL (08-07-19 @ 02:01)  POCT Blood Glucose.: 118 mg/dL (08-06-19 @ 21:29)  POCT Blood Glucose.: 95 mg/dL (08-06-19 @ 17:53)  POCT Blood Glucose.: 185 mg/dL (08-06-19 @ 14:14)  POCT Blood Glucose.: 428 mg/dL (08-06-19 @ 10:39)  POCT Blood Glucose.: 271 mg/dL (08-06-19 @ 02:06)  POCT Blood Glucose.: 232 mg/dL (08-05-19 @ 21:32)  POCT Blood Glucose.: 155 mg/dL (08-05-19 @ 19:05)  POCT Blood Glucose.: 130 mg/dL (08-05-19 @ 12:58)                            8.8    4.21  )-----------( 182      ( 08 Aug 2019 09:27 )             28.1       08-08    137  |  92<L>  |  39<H>  ----------------------------<  225<H>  4.8   |  25  |  5.48<H>    Ca    8.9      08 Aug 2019 06:11  Phos  4.4     08-07            Hemoglobin A1C, Whole Blood: 8.2 % <H> [4.0 - 5.6] (06-16-19 @ 13:24)            Contact number: isabel [phone] or [phone]

## 2019-08-08 NOTE — CHART NOTE - NSCHARTNOTEFT_GEN_A_CORE
Notified by RN patient received 2U humalog from ISS at 215AM for FS of 203. Patient seen and examined at bedside. Resting in bed comfortably NAD. Does not endorse any complaints at this time.     Vital Signs Last 24 Hrs  T(C): 36.8 (08 Aug 2019 04:30), Max: 36.8 (07 Aug 2019 04:54)  T(F): 98.3 (08 Aug 2019 04:30), Max: 98.3 (08 Aug 2019 04:30)  HR: 75 (08 Aug 2019 04:30) (75 - 88)  BP: 116/65 (08 Aug 2019 04:30) (108/82 - 135/78)  BP(mean): --  RR: 16 (08 Aug 2019 04:30) (16 - 18)  SpO2: 98% (08 Aug 2019 04:30) (96% - 100%)    430AM FS - 194   will check 7AM FS   endo f/u in AM     will continue to monitor   will endorse to day team     Sharyn Palomares PA-C   [id number]

## 2019-08-08 NOTE — DISCHARGE NOTE PROVIDER - HOSPITAL COURSE
chest pain with mild ekg changes and stable hs trop    -CHF exacerbation, NSVT, pAT, volume overload, ischemic cardiomyopathy, cad s/p multiple stents, esrd on hd, PAD s/p prior intervention     Recs:    -resolved chest pain/angina     -known extensive CAD and ICM. s/p [facility] 2/20/2019 --> severe and diffuse instent restenosis along RCA --> unable to stent due to multiple layers of stenting and prior brachytherapy    -will consider complex intervention if true ischemic and refractory sx develop     -c/w beta blockers for nsvt/pat/cad (as above).c/w  toprol 50mg bid, cont nitrates as tolerates (isordil 10mg tid)    -hx of prolonged qtc. avoid qtc prolonging meds    -s/p TTE 2019: mod-severe MR, pseudo AS (low flow-low gradient), mod-severe LV dysfunction --> recent CTS consult with dr hebert appreciated. plan is for medical management given surgical risk and patient preference    -c/w asa, plavix, statin for ischemic cardiomyopathy/CAD/PAD    -dvt ppx

## 2019-08-08 NOTE — PROGRESS NOTE ADULT - ASSESSMENT
64 f with    ESRD/ fluid overload  - HD  - Nephrology follow Dr. Schmidt    DM type 1  - ADA diet  - BS control  - endocrine follow    CHF cr systolic  - continue Rx  - cardiology follow    PAT  - follow    CAD/ s/p NSTEMI  - continue Rx  - cardiology follow    COPD  - nebs    PVD  - conservative Rx    Anxiety/ Depression control    Epistaxis resolved  - ENT follow.    DC home after HD. Follow with PMD/ Cardiology/ Nephrology in 2-3 days.    d/w patient   Pierce Viera MD pager [phone]

## 2019-08-13 ENCOUNTER — APPOINTMENT (OUTPATIENT)
Dept: THORACIC SURGERY | Facility: CLINIC | Age: 64
End: 2019-08-13

## 2019-08-23 ENCOUNTER — APPOINTMENT (OUTPATIENT)
Dept: PULMONOLOGY | Facility: CLINIC | Age: 64
End: 2019-08-23
Payer: MEDICARE

## 2019-08-23 VITALS
DIASTOLIC BLOOD PRESSURE: 72 MMHG | HEIGHT: 64 IN | RESPIRATION RATE: 16 BRPM | BODY MASS INDEX: 19.12 KG/M2 | SYSTOLIC BLOOD PRESSURE: 131 MMHG | HEART RATE: 78 BPM | OXYGEN SATURATION: 98 % | TEMPERATURE: 98.1 F | WEIGHT: 112 LBS

## 2019-08-23 DIAGNOSIS — R91.8 OTHER NONSPECIFIC ABNORMAL FINDING OF LUNG FIELD: ICD-10-CM

## 2019-08-23 DIAGNOSIS — J44.9 CHRONIC OBSTRUCTIVE PULMONARY DISEASE, UNSPECIFIED: ICD-10-CM

## 2019-08-23 DIAGNOSIS — R59.1 GENERALIZED ENLARGED LYMPH NODES: ICD-10-CM

## 2019-08-23 PROCEDURE — 99214 OFFICE O/P EST MOD 30 MIN: CPT

## 2019-08-23 NOTE — PROCEDURE
1-1.9 cm
[FreeTextEntry1] : Chest x-ray PA lateral July 26, 2019\par Cardiomegaly\par No parenchymal infiltrates identified\par Rib fracture right mid upper lung zone identified\par No dominant pulmonary nodules\par No pleural effusions or pneumothorax\par No evidence of an active pneumonia\par \par PFT without BD\par July 26 2019\par  Moderate reduction flow rates\par  Air trapping Spirometry 5/31 2019\par moderate OAD\par severe reduction DLCO with loss fx alveolar capillary units\par Noted compared to 1 mos ago stable flows\par \par \par

## 2019-08-23 NOTE — PHYSICAL EXAM
[General Appearance - Well Developed] : well developed [General Appearance - Well Nourished] : well nourished [Well Groomed] : well groomed [Normal Appearance] : normal appearance [No Deformities] : no deformities [General Appearance - In No Acute Distress] : no acute distress [Normal Conjunctiva] : the conjunctiva exhibited no abnormalities [Normal Oropharynx] : normal oropharynx [I] : I [Neck Appearance] : the appearance of the neck was normal [Neck Cervical Mass (___cm)] : no neck mass was observed [Jugular Venous Distention Increased] : there was no jugular-venous distention [Thyroid Diffuse Enlargement] : the thyroid was not enlarged [Heart Rate And Rhythm] : heart rate and rhythm were normal [Heart Sounds] : normal S1 and S2 [Murmurs] : no murmurs present [Respiration, Rhythm And Depth] : normal respiratory rhythm and effort [Exaggerated Use Of Accessory Muscles For Inspiration] : no accessory muscle use [Chest Palpation] : palpation of the chest revealed no abnormalities [Bowel Sounds] : normal bowel sounds [Lungs Percussion] : the lungs were normal to percussion [Abdomen Soft] : soft [Abdomen Tenderness] : non-tender [Abdomen Mass (___ Cm)] : no abdominal mass palpated [Nail Clubbing] : no clubbing of the fingernails [Petechial Hemorrhages (___cm)] : no petechial hemorrhages [Cyanosis, Localized] : no localized cyanosis [] : no ischemic changes [Deep Tendon Reflexes (DTR)] : deep tendon reflexes were 2+ and symmetric [Sensation] : the sensory exam was normal to light touch and pinprick [Oriented To Time, Place, And Person] : oriented to person, place, and time [No Focal Deficits] : no focal deficits [FreeTextEntry1] : distant BS with mild prolonged expiratory phase, crackles at left lung base

## 2019-08-23 NOTE — REVIEW OF SYSTEMS
[As Noted in HPI] : as noted in HPI [Edema] : ~T edema was present [Claudication] : intermittent claudication [Diabetes] : diabetes mellitus [Negative] : Genitourinary/GYN [Chest Discomfort] : no chest discomfort [Orthopnea] : no orthopnea [PND] : no PND [FreeTextEntry8] : POST EBUS with Med Bx negative flow cytology/ LN pathology Reactive  [FreeTextEntry9] : CHF ASHD [de-identified] : s/p DKA hospital admission

## 2019-08-23 NOTE — DISCUSSION/SUMMARY
[FreeTextEntry1] : COPD\par Congestive heart failure\par Renal failure on dialysis\par Status post fall with facial trauma no reported subdural hematoma prior\par \par March 6, 2019\par Addendum\par Underwent bronchoscopy E bus mediastinoscopy non Dx\par AFB tissue culture negative\par Flow cytometry no diagnostic abnormalities\par Small possibly partially loculated right pleural effusion with likely associated passive atelectasis\par Resolution of prior reported bilateral increased reticular opacities and more confluent right-sided opacities felt a reflection of improvement of pulmonary edema\par Residual increased reticular opacities at the right base possibly pulmonary vascular congestion or subsegmental atelectasis\par \par Post cardiothoracic surgery evaluation\par As noted pathology post E bus hysteroscopy March 14, 2019 was negative for tumor\par CT chest done while hospitalized for a fall at [place] [facility] on April 18, 2019 findings suggested mild pulmonary edema\par Decrease in size of the mediastinal adenopathy compared to prior exam\par Agree with cardiothoracic surgery 3-month follow-up CAT scan of the chest

## 2019-08-23 NOTE — HISTORY OF PRESENT ILLNESS
[None] : ~He/She~ has no significant interval events [Difficulty Breathing During Exertion] : stable dyspnea on exertion [Cough] : denies coughing [Feelings Of Weakness On Exertion] : stable exercise intolerance [Regional Soft Tissue Swelling Both Lower Extremities] : stable lower extremity edema [Wheezing] : denies wheezing [Chest Pain Or Discomfort] : denies chest pain [Fever] : denies fever [Former] : is a former smoker [___ Year Quit] : ~He/She~ quit smoking in [unfilled] [Wt Gain ___ Lbs] : no recent weight gain [Wt Loss ___ Lbs] : no recent weight loss [Oxygen] : the patient uses no supplemental oxygen [FreeTextEntry1] : Posthospitalization [facility] mid July 29, 2019\par Assessment end-stage renal disease with fluid overload\par Hemodialysis\par Nephrology with Dr. Schmidt\par Diabetes mellitus type 1 ADA diet blood sugar control endocrinology follow-up\par Congestive heart failure\par Cardiac medicines reviewed including metoprolol 50 mg daily aspirin 81 mg daily lisinopril 40 mill grams daily Plavix 75 mg daily Crysvita 10 mg 3 times per day\par Hydralazine 5 mg\par On insulin therapy protocol\par Statin 20 mg daily\par Oxycodone\par Pantoprazole 40 mg daily\par CT abdomen pelvis July 29 9/2019\par No acute intra-abdominal pathology\par Chest diffuse bilateral septal thickening due to pulmonary edema liver hepatic congestion due to right heart dysfunction component of congestive heart failure\par Atrophic bilateral kidneys\par Official chest x-ray interpretation of July 29 reported as clear lungs but noted that cardiac size reported normal but on this x-ray cardiac size is enlarged\par To my eye there are chronic congestive changes\par Subclavian vascular stent in place\par \par CT chest March 26, 2019 difficult to delineate bulky mediastinal bilateral hilar adenopathy\par Interval decrease in the number of bilateral groundglass opacities\par Right greater than left intralobular septal thickening\par \par \par hx E bus and mediastinoscopy cardiothoracic surgery to rule out lymphoma, rule out lung cancer with non diagnostic data\par History severe CHF\par Renal failure on hemodialysis\par Diabetes mellitus with history of recurrent episodes of DKA requiring hospitalization\par ASD\par Atherosclerotic heart disease \par pleural effusions secondary to congestive heart failure cardiac disease with fluid overload\par COPD\par Psychiatric disorder\par
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
## 2019-09-09 NOTE — PATIENT PROFILE ADULT. - NSSUBSTANCEUSE_GEN_ALL_CORE_SD
PRN Summer Goodman MD        latanoprost (XALATAN) 0.005 % ophthalmic solution 1 drop  1 drop Both Eyes Nightly Summer Goodman MD   1 drop at 09/08/19 2130    glucose (GLUTOSE) 40 % oral gel 15 g  15 g Oral PRN Summer Goodman MD        dextrose 50 % IV solution  12.5 g Intravenous PRN Summer Goodman MD        glucagon (rDNA) injection 1 mg  1 mg Intramuscular PRN Summer Goodman MD        dextrose 5 % solution  100 mL/hr Intravenous PRN Summer Goodman MD           Subjective:     No new complaints. S/P ERCP  No acute overnight events  Patient tolerating diet    Objective: Intake/Output Summary (Last 24 hours) at 9/9/2019 1338  Last data filed at 9/9/2019 0600  Gross per 24 hour   Intake 995 ml   Output 2700 ml   Net -1705 ml      Vitals:   Vitals:    09/09/19 1130   BP: (!) 144/68   Pulse: 70   Resp: 16   Temp: 97.9 °F (36.6 °C)   SpO2: 94%     Physical Exam:  General Appearance:  Alert awake not in distress  Head:      Normocephalic, without obvious abnormality, atraumatic  Eyes:       Conjunctiva/corneas clear, EOM's intact  Lungs:    Improved B/L intermittent rhonchi and wheeze   Heart:                Regular rate and rhythm, S1 and S2 normal, no murmur,   rub or gallop  Abdomen:     Soft, non-tender, bowel sounds active, no masses, no organomegaly  Extremities:   B/L BKA+  Neurological:   Grossly Intact.     Significant Diagnostic Studies:   DATA:    CBC   Recent Labs     09/07/19  0610 09/08/19  0629 09/09/19  0655   WBC 8.8 10.7* 9.3   HGB 13.3* 12.6* 12.2*   HCT 40.9* 38.5* 38.6*    210 215      BMP   Recent Labs     09/07/19  0610 09/08/19  0629 09/09/19  0655    138 142   K 4.1 4.2 4.2    101 104   CO2 24 23 28   BUN 18 22 22   CREATININE 1.0 1.0 1.1     LFT'S   Recent Labs     09/07/19  0610 09/08/19  0629 09/09/19  0655   AST 29  29 23  23 23  23   ALT 17  17 16  16 19  19   BILIDIR 0.2 0.2 0.2   BILITOT 0.7  0.7 0.5  0.5 0.3  0.3   ALKPHOS 68  68 64  64 60  60     COAG size hiatal hernia. Bowel loops are normal caliber. Lung bases grossly clear. Normal heart size. 1. Cholecystectomy with stable central intrahepatic and extrahepatic bile duct dilatation as measured above. No rounded filling defects to suggest choledocholithiasis. Few areas of heterogeneous linear hypointensity within the extrahepatic bile ducts which are nonspecific, possibly sludge or sloughed mucosa. 2. Stable mild pancreatic atrophy with diffuse ductal dilatation as measured above. Stable scattered small pancreatic cystic lesions adjacent to the pancreatic duct largest largest again in the superior pancreatic body suggesting side-branch IPMNs. Consider follow-up assessment in 1-2 years to ensure continued stability.            Nura Duty  [facility]

## 2019-09-13 NOTE — PATIENT PROFILE ADULT. - CAREGIVER NAME
Cl+Me-Isothiazolinone (Simona Crabtree): no reaction Nickel Sulfate: 3+ Detail Level: Zone Fragrance Mix: 1+ What Reading Time Point?: 48 hour Number Of Patches Read: 36 Show Negative Results In The Note?: Yes Chemo Cui

## 2019-09-14 ENCOUNTER — INPATIENT (INPATIENT)
Facility: HOSPITAL | Age: 64
LOS: 5 days | Discharge: HOME | DRG: 871 | End: 2019-09-20
Attending: INTERNAL MEDICINE | Admitting: INTERNAL MEDICINE
Payer: MEDICARE

## 2019-09-14 VITALS
RESPIRATION RATE: 22 BRPM | DIASTOLIC BLOOD PRESSURE: 71 MMHG | HEIGHT: 64 IN | SYSTOLIC BLOOD PRESSURE: 138 MMHG | OXYGEN SATURATION: 94 % | TEMPERATURE: 100 F | HEART RATE: 110 BPM | WEIGHT: 139.99 LBS

## 2019-09-14 DIAGNOSIS — Z98.89 OTHER SPECIFIED POSTPROCEDURAL STATES: Chronic | ICD-10-CM

## 2019-09-14 LAB — GAS PNL BLDV: SIGNIFICANT CHANGE UP

## 2019-09-14 PROCEDURE — 99285 EMERGENCY DEPT VISIT HI MDM: CPT | Mod: GC

## 2019-09-14 PROCEDURE — 71045 X-RAY EXAM CHEST 1 VIEW: CPT | Mod: 26

## 2019-09-14 RX ORDER — ACETAMINOPHEN 500 MG
650 TABLET ORAL ONCE
Refills: 0 | Status: COMPLETED | OUTPATIENT
Start: 2019-09-14 | End: 2019-09-14

## 2019-09-14 RX ORDER — SODIUM CHLORIDE 9 MG/ML
1000 INJECTION INTRAMUSCULAR; INTRAVENOUS; SUBCUTANEOUS ONCE
Refills: 0 | Status: DISCONTINUED | OUTPATIENT
Start: 2019-09-14 | End: 2019-09-14

## 2019-09-14 RX ORDER — SODIUM CHLORIDE 9 MG/ML
2000 INJECTION INTRAMUSCULAR; INTRAVENOUS; SUBCUTANEOUS ONCE
Refills: 0 | Status: DISCONTINUED | OUTPATIENT
Start: 2019-09-14 | End: 2019-09-14

## 2019-09-14 RX ADMIN — Medication 650 MILLIGRAM(S): at 23:31

## 2019-09-14 NOTE — ED PROVIDER NOTE - PHYSICAL EXAMINATION
CONSTITUTIONAL: ill appearing  SKIN: Warm dry, normal skin turgor  HEAD: NCAT  EYES: EOMI, no scleral icterus  ENT: dry mucous membranes  NECK: Supple. Full ROM.  CARD:  RESP: bilateral crackles in the upper lung fields. hacking cough  ABD: soft, non-tender, non-distended, no rebound or guarding.  EXT: Full ROM, no bony tenderness, 2+ b/l pitting edema, no calf tenderness  NEURO: A&Ox4. Normal speech. No facial droop.   PSYCH: Cooperative, appropriate. CONSTITUTIONAL: ill appearing  SKIN: Warm dry, normal skin turgor  HEAD: NCAT  EYES: EOMI, no scleral icterus  ENT: dry mucous membranes  NECK: Supple. Full ROM.  CARD: Normal S1/S2, Tachycardic. No murmurs, rubs, or gallops   RESP: bilateral crackles in the upper lung fields w/ inspiratory wheezing, hacking cough, speaking in full sentences. Tachypneic.   ABD: non-distended, diffuse ttp, no rebound or guarding.  EXT: Full ROM, 2+ b/l pitting edema, no calf tenderness  NEURO: A&Ox4. Normal speech. No facial droop.   PSYCH: Cooperative, appropriate.

## 2019-09-14 NOTE — ED PROVIDER NOTE - OBJECTIVE STATEMENT
65 y/o F w/ PMHx ESRD (HD M/T/T/Sat), IDDM, CHF, CAD, ischemic cardiomyopathy presents to the ED BIBA for fever and AMS. She also reports a productive cough w/ yellow sputum for "days". She otherwise had her full course of dialysis today and reports chills at that time.

## 2019-09-14 NOTE — ED PROVIDER NOTE - NS ED ROS FT
Constitutional:  (+) fever, (+) chills, (-) lethargy  Eyes:  (-) eye pain (-) visual changes  ENMT: (-) nasal discharge, (-) sore throat. (-) neck pain or stiffness  Cardiac: (+) chest pain (-) palpitations  Respiratory:  (+) SOB (+) cough (-) respiratory distress.  GI:  (-) nausea (-) vomiting (-) diarrhea (+) abdominal pain.  :  (-) dysuria (-) frequency (-) burning.  MS:  (-) back pain (-) joint pain.  Neuro: (-) headache (-) numbness (-) tingling (-) focal weakness  Psych: (+) confusion  Skin:  (-) rash  Except as documented in the HPI,  all other systems are negative

## 2019-09-14 NOTE — ED PROVIDER NOTE - PROGRESS NOTE DETAILS
Luly Castillo PGY2  RVP shows enterorhinovirus, cxr clear, started on vanc/zosyn, admitted to dr. muller

## 2019-09-14 NOTE — ED ADULT NURSE NOTE - ED STAT RN HANDOFF DETAILS
Handoff report provided to Gertrude CARMONA. Understands pmh, medications given and plan of care for patient. Patient in stable condition, vital signs updated, has no complaints at this time and has been updated on care plan. Explained to patient that it is change of shift and new nurse is taking over, pt verbalized understanding.

## 2019-09-14 NOTE — ED PROVIDER NOTE - CLINICAL SUMMARY MEDICAL DECISION MAKING FREE TEXT BOX
63 y/o F w/ PMHx ESRD (HD M/T/T/Sat), IDDM, CHF, CAD, ischemic cardiomyopathy presents to the ED BIBA for fever and AMS. Consider sepsis, PNA, UTI, ACS. Will order CBC, CMP, blood cultures, VBG, EKG, Trop, UA. Will reassess.

## 2019-09-14 NOTE — ED PROVIDER NOTE - SEVERE SEPSIS ALERT DETAILS
Patient is a dialysis patient so was not given calculated IVF. Patient's hemodynamics were monitored and given 500 IV Bolus and antibiotics.

## 2019-09-14 NOTE — ED ADULT NURSE NOTE - OBJECTIVE STATEMENT
65 YO female with PMH ESRD (AV fistula left arm, MTTS), IDDM, CHF, CAD, COPD, ACS, TIA, gout, CVA, HLD, HTN, ASD sp closure, via EMS presenting with complaints of fever and altered mental status. As per patient, for the last day, pt has had productive yellow cough, and lethargy. Family thought pt sugar was low, therefore called 911. Upon arrival, EMS found pt's blood sugar to be 191. Pt hypoxic therefore started on 2L nasal cannula upon arrival, at [facility], room saturation of 88%. Pt also endorsing leg pain, took one dose of pain medications prior to arrival. Pt denies chest pain, shortness of breath, visual disturbances, numbness/tingling, diaphoresis, headache, nausea, vomiting, constipation, diarrhea, or urinary symptoms.   Pt Axox4, gross neuro intact, PERRL 3 mm. Lungs clear throughout bilateral. S1S2 heard, sinus tachycardia on cardiac monitor. Abdomen soft, non-tender, non-distended. Skin warm, dry, and intact. Pt placed in position of comfort. Pt educated on call bell system and provided call bell. Bed in lowest position, wheels locked, appropriate side rails raised. Pt denies needs at this time.

## 2019-09-14 NOTE — ED PROVIDER NOTE - ATTENDING CONTRIBUTION TO CARE
****ATTENDING**** 63yo f hx listed BIB EMS for fever and chills. Patient states she has a productive cough x few days and feels fatigued and tired. Denies any chest pain, palp. Pt completed dialysis today.  On exam, patient is noted to be febrile. Patient is awake,alert,oriented x 3. Patient's chest w coarse bs b/l., +s1s2. Abdomen is soft nd/nt +BS. L AV fistula.   Check labs, cultures to eval for pneumonia. Broad spectrum antibiotics.

## 2019-09-14 NOTE — ED ADULT TRIAGE NOTE - SPO2 (%)
94 Advancement-Rotation Flap Text: The defect edges were debeveled with a #15 scalpel blade.  Given the location of the defect, shape of the defect and the proximity to free margins an advancement-rotation flap was deemed most appropriate.  Using a sterile surgical marker, an appropriate flap was drawn incorporating the defect and placing the expected incisions within the relaxed skin tension lines where possible. The area thus outlined was incised deep to adipose tissue with a #15 scalpel blade.  The skin margins were undermined to an appropriate distance in all directions utilizing iris scissors.

## 2019-09-14 NOTE — ED ADULT NURSE NOTE - NSIMPLEMENTINTERV_GEN_ALL_ED
Implemented All Fall Risk Interventions:  [place] to call system. Call bell, personal items and telephone within reach. Instruct patient to call for assistance. Room bathroom lighting operational. Non-slip footwear when patient is off stretcher. Physically safe environment: no spills, clutter or unnecessary equipment. Stretcher in lowest position, wheels locked, appropriate side rails in place. Provide visual cue, wrist band, yellow gown, etc. Monitor gait and stability. Monitor for mental status changes and reorient to person, place, and time. Review medications for side effects contributing to fall risk. Reinforce activity limits and safety measures with patient and family.

## 2019-09-15 DIAGNOSIS — E10.22 TYPE 1 DIABETES MELLITUS WITH DIABETIC CHRONIC KIDNEY DISEASE: ICD-10-CM

## 2019-09-15 DIAGNOSIS — E78.5 HYPERLIPIDEMIA, UNSPECIFIED: ICD-10-CM

## 2019-09-15 DIAGNOSIS — E10.10 TYPE 1 DIABETES MELLITUS WITH KETOACIDOSIS WITHOUT COMA: ICD-10-CM

## 2019-09-15 DIAGNOSIS — A41.9 SEPSIS, UNSPECIFIED ORGANISM: ICD-10-CM

## 2019-09-15 DIAGNOSIS — I10 ESSENTIAL (PRIMARY) HYPERTENSION: ICD-10-CM

## 2019-09-15 LAB
ALBUMIN SERPL ELPH-MCNC: 3.4 G/DL — SIGNIFICANT CHANGE UP (ref 3.3–5)
ALBUMIN SERPL ELPH-MCNC: 3.9 G/DL — SIGNIFICANT CHANGE UP (ref 3.3–5)
ALBUMIN SERPL ELPH-MCNC: 4.1 G/DL — SIGNIFICANT CHANGE UP (ref 3.3–5)
ALBUMIN SERPL ELPH-MCNC: 4.3 G/DL — SIGNIFICANT CHANGE UP (ref 3.3–5)
ALP SERPL-CCNC: 104 U/L — SIGNIFICANT CHANGE UP (ref 40–120)
ALP SERPL-CCNC: 117 U/L — SIGNIFICANT CHANGE UP (ref 40–120)
ALP SERPL-CCNC: 119 U/L — SIGNIFICANT CHANGE UP (ref 40–120)
ALP SERPL-CCNC: 125 U/L — HIGH (ref 40–120)
ALT FLD-CCNC: 35 U/L — SIGNIFICANT CHANGE UP (ref 10–45)
ALT FLD-CCNC: 40 U/L — SIGNIFICANT CHANGE UP (ref 10–45)
ALT FLD-CCNC: 43 U/L — SIGNIFICANT CHANGE UP (ref 10–45)
ALT FLD-CCNC: 44 U/L — SIGNIFICANT CHANGE UP (ref 10–45)
ANION GAP SERPL CALC-SCNC: 18 MMOL/L — HIGH (ref 5–17)
ANION GAP SERPL CALC-SCNC: 18 MMOL/L — HIGH (ref 5–17)
ANION GAP SERPL CALC-SCNC: 20 MMOL/L — HIGH (ref 5–17)
ANION GAP SERPL CALC-SCNC: 25 MMOL/L — HIGH (ref 5–17)
ANION GAP SERPL CALC-SCNC: 33 MMOL/L — HIGH (ref 5–17)
APTT BLD: 28 SEC — SIGNIFICANT CHANGE UP (ref 27.5–36.3)
AST SERPL-CCNC: 40 U/L — SIGNIFICANT CHANGE UP (ref 10–40)
AST SERPL-CCNC: 42 U/L — HIGH (ref 10–40)
AST SERPL-CCNC: 61 U/L — HIGH (ref 10–40)
AST SERPL-CCNC: 63 U/L — HIGH (ref 10–40)
B-OH-BUTYR SERPL-SCNC: 0.9 MMOL/L — HIGH
B-OH-BUTYR SERPL-SCNC: 1.7 MMOL/L — HIGH
B-OH-BUTYR SERPL-SCNC: 5.9 MMOL/L — HIGH
BASOPHILS # BLD AUTO: 0 K/UL — SIGNIFICANT CHANGE UP (ref 0–0.2)
BILIRUB SERPL-MCNC: 0.3 MG/DL — SIGNIFICANT CHANGE UP (ref 0.2–1.2)
BILIRUB SERPL-MCNC: 0.4 MG/DL — SIGNIFICANT CHANGE UP (ref 0.2–1.2)
BILIRUB SERPL-MCNC: 0.4 MG/DL — SIGNIFICANT CHANGE UP (ref 0.2–1.2)
BILIRUB SERPL-MCNC: 0.7 MG/DL — SIGNIFICANT CHANGE UP (ref 0.2–1.2)
BUN SERPL-MCNC: 16 MG/DL — SIGNIFICANT CHANGE UP (ref 7–23)
BUN SERPL-MCNC: 21 MG/DL — SIGNIFICANT CHANGE UP (ref 7–23)
BUN SERPL-MCNC: 24 MG/DL — HIGH (ref 7–23)
BUN SERPL-MCNC: 32 MG/DL — HIGH (ref 7–23)
BUN SERPL-MCNC: 35 MG/DL — HIGH (ref 7–23)
CALCIUM SERPL-MCNC: 9 MG/DL — SIGNIFICANT CHANGE UP (ref 8.4–10.5)
CALCIUM SERPL-MCNC: 9.4 MG/DL — SIGNIFICANT CHANGE UP (ref 8.4–10.5)
CALCIUM SERPL-MCNC: 9.5 MG/DL — SIGNIFICANT CHANGE UP (ref 8.4–10.5)
CALCIUM SERPL-MCNC: 9.8 MG/DL — SIGNIFICANT CHANGE UP (ref 8.4–10.5)
CALCIUM SERPL-MCNC: 9.9 MG/DL — SIGNIFICANT CHANGE UP (ref 8.4–10.5)
CHLORIDE SERPL-SCNC: 84 MMOL/L — LOW (ref 96–108)
CHLORIDE SERPL-SCNC: 89 MMOL/L — LOW (ref 96–108)
CHLORIDE SERPL-SCNC: 93 MMOL/L — LOW (ref 96–108)
CHLORIDE SERPL-SCNC: 95 MMOL/L — LOW (ref 96–108)
CHLORIDE SERPL-SCNC: 95 MMOL/L — LOW (ref 96–108)
CK MB BLD-MCNC: 2.5 % — SIGNIFICANT CHANGE UP (ref 0–3.5)
CK MB CFR SERPL CALC: 5.9 NG/ML — HIGH (ref 0–3.8)
CK SERPL-CCNC: 240 U/L — HIGH (ref 25–170)
CO2 SERPL-SCNC: 14 MMOL/L — LOW (ref 22–31)
CO2 SERPL-SCNC: 22 MMOL/L — SIGNIFICANT CHANGE UP (ref 22–31)
CO2 SERPL-SCNC: 25 MMOL/L — SIGNIFICANT CHANGE UP (ref 22–31)
CO2 SERPL-SCNC: 26 MMOL/L — SIGNIFICANT CHANGE UP (ref 22–31)
CO2 SERPL-SCNC: 28 MMOL/L — SIGNIFICANT CHANGE UP (ref 22–31)
CREAT SERPL-MCNC: 2.7 MG/DL — HIGH (ref 0.5–1.3)
CREAT SERPL-MCNC: 3.21 MG/DL — HIGH (ref 0.5–1.3)
CREAT SERPL-MCNC: 4.17 MG/DL — HIGH (ref 0.5–1.3)
CREAT SERPL-MCNC: 4.78 MG/DL — HIGH (ref 0.5–1.3)
CREAT SERPL-MCNC: 5.06 MG/DL — HIGH (ref 0.5–1.3)
EOSINOPHIL # BLD AUTO: 0 K/UL — SIGNIFICANT CHANGE UP (ref 0–0.5)
GLUCOSE BLDC GLUCOMTR-MCNC: 122 MG/DL — HIGH (ref 70–99)
GLUCOSE BLDC GLUCOMTR-MCNC: 174 MG/DL — HIGH (ref 70–99)
GLUCOSE BLDC GLUCOMTR-MCNC: 178 MG/DL — HIGH (ref 70–99)
GLUCOSE BLDC GLUCOMTR-MCNC: 215 MG/DL — HIGH (ref 70–99)
GLUCOSE BLDC GLUCOMTR-MCNC: 269 MG/DL — HIGH (ref 70–99)
GLUCOSE BLDC GLUCOMTR-MCNC: 313 MG/DL — HIGH (ref 70–99)
GLUCOSE BLDC GLUCOMTR-MCNC: 325 MG/DL — HIGH (ref 70–99)
GLUCOSE BLDC GLUCOMTR-MCNC: 331 MG/DL — HIGH (ref 70–99)
GLUCOSE BLDC GLUCOMTR-MCNC: 421 MG/DL — HIGH (ref 70–99)
GLUCOSE SERPL-MCNC: 154 MG/DL — HIGH (ref 70–99)
GLUCOSE SERPL-MCNC: 187 MG/DL — HIGH (ref 70–99)
GLUCOSE SERPL-MCNC: 350 MG/DL — HIGH (ref 70–99)
GLUCOSE SERPL-MCNC: 417 MG/DL — HIGH (ref 70–99)
GLUCOSE SERPL-MCNC: 456 MG/DL — CRITICAL HIGH (ref 70–99)
HCT VFR BLD CALC: 25.5 % — LOW (ref 34.5–45)
HCT VFR BLD CALC: 30.1 % — LOW (ref 34.5–45)
HCT VFR BLD CALC: 30.5 % — LOW (ref 34.5–45)
HCT VFR BLD CALC: 32.7 % — LOW (ref 34.5–45)
HGB BLD-MCNC: 10.2 G/DL — LOW (ref 11.5–15.5)
HGB BLD-MCNC: 10.4 G/DL — LOW (ref 11.5–15.5)
HGB BLD-MCNC: 8.6 G/DL — LOW (ref 11.5–15.5)
HGB BLD-MCNC: 9.1 G/DL — LOW (ref 11.5–15.5)
INR BLD: 1.15 RATIO — SIGNIFICANT CHANGE UP (ref 0.88–1.16)
LYMPHOCYTES # BLD AUTO: 0.4 K/UL — LOW (ref 1–3.3)
LYMPHOCYTES # BLD AUTO: 4 % — LOW (ref 13–44)
MAGNESIUM SERPL-MCNC: 2 MG/DL — SIGNIFICANT CHANGE UP (ref 1.6–2.6)
MAGNESIUM SERPL-MCNC: 2.1 MG/DL — SIGNIFICANT CHANGE UP (ref 1.6–2.6)
MCHC RBC-ENTMCNC: 30.2 GM/DL — LOW (ref 32–36)
MCHC RBC-ENTMCNC: 31.8 GM/DL — LOW (ref 32–36)
MCHC RBC-ENTMCNC: 33.5 GM/DL — SIGNIFICANT CHANGE UP (ref 32–36)
MCHC RBC-ENTMCNC: 33.5 PG — SIGNIFICANT CHANGE UP (ref 27–34)
MCHC RBC-ENTMCNC: 33.7 GM/DL — SIGNIFICANT CHANGE UP (ref 32–36)
MCHC RBC-ENTMCNC: 33.8 PG — SIGNIFICANT CHANGE UP (ref 27–34)
MCHC RBC-ENTMCNC: 35.7 PG — HIGH (ref 27–34)
MCHC RBC-ENTMCNC: 35.8 PG — HIGH (ref 27–34)
MCV RBC AUTO: 106 FL — HIGH (ref 80–100)
MCV RBC AUTO: 107 FL — HIGH (ref 80–100)
MCV RBC AUTO: 107 FL — HIGH (ref 80–100)
MCV RBC AUTO: 110.7 FL — HIGH (ref 80–100)
MONOCYTES # BLD AUTO: 1.1 K/UL — HIGH (ref 0–0.9)
MONOCYTES NFR BLD AUTO: 11 % — SIGNIFICANT CHANGE UP (ref 2–14)
NEUTROPHILS # BLD AUTO: 10.3 K/UL — HIGH (ref 1.8–7.4)
NEUTROPHILS NFR BLD AUTO: 82 % — HIGH (ref 43–77)
NT-PROBNP SERPL-SCNC: HIGH PG/ML (ref 0–300)
PHOSPHATE SERPL-MCNC: 4.5 MG/DL — SIGNIFICANT CHANGE UP (ref 2.5–4.5)
PHOSPHATE SERPL-MCNC: 4.9 MG/DL — HIGH (ref 2.5–4.5)
PLATELET # BLD AUTO: 244 K/UL — SIGNIFICANT CHANGE UP (ref 150–400)
PLATELET # BLD AUTO: 267 K/UL — SIGNIFICANT CHANGE UP (ref 150–400)
PLATELET # BLD AUTO: 271 K/UL — SIGNIFICANT CHANGE UP (ref 150–400)
PLATELET # BLD AUTO: 306 K/UL — SIGNIFICANT CHANGE UP (ref 150–400)
POTASSIUM SERPL-MCNC: 4.8 MMOL/L — SIGNIFICANT CHANGE UP (ref 3.5–5.3)
POTASSIUM SERPL-MCNC: 4.9 MMOL/L — SIGNIFICANT CHANGE UP (ref 3.5–5.3)
POTASSIUM SERPL-MCNC: 5.5 MMOL/L — HIGH (ref 3.5–5.3)
POTASSIUM SERPL-MCNC: 6 MMOL/L — HIGH (ref 3.5–5.3)
POTASSIUM SERPL-MCNC: 6.2 MMOL/L — CRITICAL HIGH (ref 3.5–5.3)
POTASSIUM SERPL-SCNC: 4.8 MMOL/L — SIGNIFICANT CHANGE UP (ref 3.5–5.3)
POTASSIUM SERPL-SCNC: 4.9 MMOL/L — SIGNIFICANT CHANGE UP (ref 3.5–5.3)
POTASSIUM SERPL-SCNC: 5.5 MMOL/L — HIGH (ref 3.5–5.3)
POTASSIUM SERPL-SCNC: 6 MMOL/L — HIGH (ref 3.5–5.3)
POTASSIUM SERPL-SCNC: 6.2 MMOL/L — CRITICAL HIGH (ref 3.5–5.3)
PROT SERPL-MCNC: 6.1 G/DL — SIGNIFICANT CHANGE UP (ref 6–8.3)
PROT SERPL-MCNC: 6.9 G/DL — SIGNIFICANT CHANGE UP (ref 6–8.3)
PROT SERPL-MCNC: 7.2 G/DL — SIGNIFICANT CHANGE UP (ref 6–8.3)
PROT SERPL-MCNC: 7.5 G/DL — SIGNIFICANT CHANGE UP (ref 6–8.3)
PROTHROM AB SERPL-ACNC: 13.3 SEC — HIGH (ref 10–12.9)
RAPID RVP RESULT: DETECTED
RBC # BLD: 2.4 M/UL — LOW (ref 3.8–5.2)
RBC # BLD: 2.72 M/UL — LOW (ref 3.8–5.2)
RBC # BLD: 2.86 M/UL — LOW (ref 3.8–5.2)
RBC # BLD: 3.07 M/UL — LOW (ref 3.8–5.2)
RBC # FLD: 12.8 % — SIGNIFICANT CHANGE UP (ref 10.3–14.5)
RBC # FLD: 13 % — SIGNIFICANT CHANGE UP (ref 10.3–14.5)
RBC # FLD: 13.2 % — SIGNIFICANT CHANGE UP (ref 10.3–14.5)
RBC # FLD: 14.5 % — SIGNIFICANT CHANGE UP (ref 10.3–14.5)
RV+EV RNA SPEC QL NAA+PROBE: DETECTED
SODIUM SERPL-SCNC: 131 MMOL/L — LOW (ref 135–145)
SODIUM SERPL-SCNC: 136 MMOL/L — SIGNIFICANT CHANGE UP (ref 135–145)
SODIUM SERPL-SCNC: 138 MMOL/L — SIGNIFICANT CHANGE UP (ref 135–145)
SODIUM SERPL-SCNC: 139 MMOL/L — SIGNIFICANT CHANGE UP (ref 135–145)
SODIUM SERPL-SCNC: 141 MMOL/L — SIGNIFICANT CHANGE UP (ref 135–145)
TROPONIN T, HIGH SENSITIVITY RESULT: 367 NG/L — HIGH (ref 0–51)
TROPONIN T, HIGH SENSITIVITY RESULT: 371 NG/L — HIGH (ref 0–51)
TROPONIN T, HIGH SENSITIVITY RESULT: 398 NG/L — HIGH (ref 0–51)
WBC # BLD: 11.9 K/UL — HIGH (ref 3.8–10.5)
WBC # BLD: 12.63 K/UL — HIGH (ref 3.8–10.5)
WBC # BLD: 9 K/UL — SIGNIFICANT CHANGE UP (ref 3.8–10.5)
WBC # BLD: 9 K/UL — SIGNIFICANT CHANGE UP (ref 3.8–10.5)
WBC # FLD AUTO: 11.9 K/UL — HIGH (ref 3.8–10.5)
WBC # FLD AUTO: 12.63 K/UL — HIGH (ref 3.8–10.5)
WBC # FLD AUTO: 9 K/UL — SIGNIFICANT CHANGE UP (ref 3.8–10.5)
WBC # FLD AUTO: 9 K/UL — SIGNIFICANT CHANGE UP (ref 3.8–10.5)

## 2019-09-15 PROCEDURE — 99223 1ST HOSP IP/OBS HIGH 75: CPT

## 2019-09-15 PROCEDURE — 99291 CRITICAL CARE FIRST HOUR: CPT

## 2019-09-15 RX ORDER — DOCUSATE SODIUM 100 MG
100 CAPSULE ORAL THREE TIMES A DAY
Refills: 0 | Status: DISCONTINUED | OUTPATIENT
Start: 2019-09-15 | End: 2019-09-17

## 2019-09-15 RX ORDER — INSULIN LISPRO 100/ML
VIAL (ML) SUBCUTANEOUS
Refills: 0 | Status: DISCONTINUED | OUTPATIENT
Start: 2019-09-15 | End: 2019-09-17

## 2019-09-15 RX ORDER — INSULIN LISPRO 100/ML
VIAL (ML) SUBCUTANEOUS
Refills: 0 | Status: DISCONTINUED | OUTPATIENT
Start: 2019-09-15 | End: 2019-09-15

## 2019-09-15 RX ORDER — SODIUM CHLORIDE 9 MG/ML
1000 INJECTION, SOLUTION INTRAVENOUS
Refills: 0 | Status: DISCONTINUED | OUTPATIENT
Start: 2019-09-15 | End: 2019-09-17

## 2019-09-15 RX ORDER — VANCOMYCIN HCL 1 G
1000 VIAL (EA) INTRAVENOUS ONCE
Refills: 0 | Status: COMPLETED | OUTPATIENT
Start: 2019-09-15 | End: 2019-09-15

## 2019-09-15 RX ORDER — GLUCAGON INJECTION, SOLUTION 0.5 MG/.1ML
1 INJECTION, SOLUTION SUBCUTANEOUS ONCE
Refills: 0 | Status: DISCONTINUED | OUTPATIENT
Start: 2019-09-15 | End: 2019-09-16

## 2019-09-15 RX ORDER — INSULIN GLARGINE 100 [IU]/ML
7 INJECTION, SOLUTION SUBCUTANEOUS AT BEDTIME
Refills: 0 | Status: DISCONTINUED | OUTPATIENT
Start: 2019-09-15 | End: 2019-09-15

## 2019-09-15 RX ORDER — SENNA PLUS 8.6 MG/1
2 TABLET ORAL AT BEDTIME
Refills: 0 | Status: DISCONTINUED | OUTPATIENT
Start: 2019-09-15 | End: 2019-09-17

## 2019-09-15 RX ORDER — INSULIN LISPRO 100/ML
VIAL (ML) SUBCUTANEOUS AT BEDTIME
Refills: 0 | Status: DISCONTINUED | OUTPATIENT
Start: 2019-09-15 | End: 2019-09-15

## 2019-09-15 RX ORDER — PANTOPRAZOLE SODIUM 20 MG/1
40 TABLET, DELAYED RELEASE ORAL
Refills: 0 | Status: DISCONTINUED | OUTPATIENT
Start: 2019-09-15 | End: 2019-09-17

## 2019-09-15 RX ORDER — CLOPIDOGREL BISULFATE 75 MG/1
75 TABLET, FILM COATED ORAL DAILY
Refills: 0 | Status: DISCONTINUED | OUTPATIENT
Start: 2019-09-15 | End: 2019-09-17

## 2019-09-15 RX ORDER — INSULIN GLARGINE 100 [IU]/ML
7 INJECTION, SOLUTION SUBCUTANEOUS ONCE
Refills: 0 | Status: COMPLETED | OUTPATIENT
Start: 2019-09-15 | End: 2019-09-15

## 2019-09-15 RX ORDER — ATORVASTATIN CALCIUM 80 MG/1
20 TABLET, FILM COATED ORAL AT BEDTIME
Refills: 0 | Status: DISCONTINUED | OUTPATIENT
Start: 2019-09-15 | End: 2019-09-17

## 2019-09-15 RX ORDER — INSULIN HUMAN 100 [IU]/ML
8 INJECTION, SOLUTION SUBCUTANEOUS ONCE
Refills: 0 | Status: COMPLETED | OUTPATIENT
Start: 2019-09-15 | End: 2019-09-15

## 2019-09-15 RX ORDER — ISOSORBIDE DINITRATE 5 MG/1
10 TABLET ORAL THREE TIMES A DAY
Refills: 0 | Status: DISCONTINUED | OUTPATIENT
Start: 2019-09-15 | End: 2019-09-17

## 2019-09-15 RX ORDER — INSULIN GLARGINE 100 [IU]/ML
7 INJECTION, SOLUTION SUBCUTANEOUS AT BEDTIME
Refills: 0 | Status: DISCONTINUED | OUTPATIENT
Start: 2019-09-16 | End: 2019-09-17

## 2019-09-15 RX ORDER — HEPARIN SODIUM 5000 [USP'U]/ML
5000 INJECTION INTRAVENOUS; SUBCUTANEOUS EVERY 12 HOURS
Refills: 0 | Status: DISCONTINUED | OUTPATIENT
Start: 2019-09-15 | End: 2019-09-17

## 2019-09-15 RX ORDER — INSULIN GLARGINE 100 [IU]/ML
5 INJECTION, SOLUTION SUBCUTANEOUS AT BEDTIME
Refills: 0 | Status: DISCONTINUED | OUTPATIENT
Start: 2019-09-15 | End: 2019-09-15

## 2019-09-15 RX ORDER — INSULIN LISPRO 100/ML
3 VIAL (ML) SUBCUTANEOUS
Refills: 0 | Status: DISCONTINUED | OUTPATIENT
Start: 2019-09-15 | End: 2019-09-16

## 2019-09-15 RX ORDER — INSULIN LISPRO 100/ML
VIAL (ML) SUBCUTANEOUS AT BEDTIME
Refills: 0 | Status: DISCONTINUED | OUTPATIENT
Start: 2019-09-15 | End: 2019-09-16

## 2019-09-15 RX ORDER — ASPIRIN/CALCIUM CARB/MAGNESIUM 324 MG
81 TABLET ORAL DAILY
Refills: 0 | Status: DISCONTINUED | OUTPATIENT
Start: 2019-09-15 | End: 2019-09-17

## 2019-09-15 RX ORDER — DEXTROSE 50 % IN WATER 50 %
12.5 SYRINGE (ML) INTRAVENOUS ONCE
Refills: 0 | Status: DISCONTINUED | OUTPATIENT
Start: 2019-09-15 | End: 2019-09-16

## 2019-09-15 RX ORDER — OXYCODONE AND ACETAMINOPHEN 5; 325 MG/1; MG/1
1 TABLET ORAL EVERY 6 HOURS
Refills: 0 | Status: DISCONTINUED | OUTPATIENT
Start: 2019-09-15 | End: 2019-09-17

## 2019-09-15 RX ORDER — DEXTROSE 50 % IN WATER 50 %
25 SYRINGE (ML) INTRAVENOUS ONCE
Refills: 0 | Status: DISCONTINUED | OUTPATIENT
Start: 2019-09-15 | End: 2019-09-16

## 2019-09-15 RX ORDER — INSULIN LISPRO 100/ML
3 VIAL (ML) SUBCUTANEOUS
Refills: 0 | Status: DISCONTINUED | OUTPATIENT
Start: 2019-09-15 | End: 2019-09-15

## 2019-09-15 RX ORDER — INSULIN HUMAN 100 [IU]/ML
1 INJECTION, SOLUTION SUBCUTANEOUS
Qty: 100 | Refills: 0 | Status: DISCONTINUED | OUTPATIENT
Start: 2019-09-15 | End: 2019-09-15

## 2019-09-15 RX ORDER — DEXTROSE 50 % IN WATER 50 %
15 SYRINGE (ML) INTRAVENOUS ONCE
Refills: 0 | Status: DISCONTINUED | OUTPATIENT
Start: 2019-09-15 | End: 2019-09-16

## 2019-09-15 RX ORDER — SODIUM CHLORIDE 9 MG/ML
250 INJECTION INTRAMUSCULAR; INTRAVENOUS; SUBCUTANEOUS ONCE
Refills: 0 | Status: COMPLETED | OUTPATIENT
Start: 2019-09-15 | End: 2019-09-15

## 2019-09-15 RX ORDER — CHLORHEXIDINE GLUCONATE 213 G/1000ML
1 SOLUTION TOPICAL
Refills: 0 | Status: DISCONTINUED | OUTPATIENT
Start: 2019-09-15 | End: 2019-09-17

## 2019-09-15 RX ORDER — SODIUM CHLORIDE 9 MG/ML
1000 INJECTION INTRAMUSCULAR; INTRAVENOUS; SUBCUTANEOUS ONCE
Refills: 0 | Status: DISCONTINUED | OUTPATIENT
Start: 2019-09-15 | End: 2019-09-15

## 2019-09-15 RX ORDER — METOPROLOL TARTRATE 50 MG
50 TABLET ORAL DAILY
Refills: 0 | Status: DISCONTINUED | OUTPATIENT
Start: 2019-09-15 | End: 2019-09-17

## 2019-09-15 RX ORDER — HYDRALAZINE HCL 50 MG
25 TABLET ORAL THREE TIMES A DAY
Refills: 0 | Status: DISCONTINUED | OUTPATIENT
Start: 2019-09-15 | End: 2019-09-17

## 2019-09-15 RX ORDER — IPRATROPIUM/ALBUTEROL SULFATE 18-103MCG
3 AEROSOL WITH ADAPTER (GRAM) INHALATION EVERY 6 HOURS
Refills: 0 | Status: DISCONTINUED | OUTPATIENT
Start: 2019-09-15 | End: 2019-09-17

## 2019-09-15 RX ORDER — PIPERACILLIN AND TAZOBACTAM 4; .5 G/20ML; G/20ML
3.38 INJECTION, POWDER, LYOPHILIZED, FOR SOLUTION INTRAVENOUS ONCE
Refills: 0 | Status: COMPLETED | OUTPATIENT
Start: 2019-09-15 | End: 2019-09-15

## 2019-09-15 RX ORDER — SODIUM CHLORIDE 9 MG/ML
1000 INJECTION, SOLUTION INTRAVENOUS
Refills: 0 | Status: DISCONTINUED | OUTPATIENT
Start: 2019-09-15 | End: 2019-09-15

## 2019-09-15 RX ORDER — BUDESONIDE, MICRONIZED 100 %
0.5 POWDER (GRAM) MISCELLANEOUS
Refills: 0 | Status: DISCONTINUED | OUTPATIENT
Start: 2019-09-15 | End: 2019-09-17

## 2019-09-15 RX ORDER — DEXTROSE 10 % IN WATER 10 %
1000 INTRAVENOUS SOLUTION INTRAVENOUS
Refills: 0 | Status: DISCONTINUED | OUTPATIENT
Start: 2019-09-15 | End: 2019-09-15

## 2019-09-15 RX ORDER — ACETAMINOPHEN 500 MG
650 TABLET ORAL EVERY 6 HOURS
Refills: 0 | Status: DISCONTINUED | OUTPATIENT
Start: 2019-09-15 | End: 2019-09-17

## 2019-09-15 RX ORDER — INSULIN LISPRO 100/ML
VIAL (ML) SUBCUTANEOUS EVERY 6 HOURS
Refills: 0 | Status: DISCONTINUED | OUTPATIENT
Start: 2019-09-15 | End: 2019-09-15

## 2019-09-15 RX ADMIN — Medication 3 MILLILITER(S): at 23:45

## 2019-09-15 RX ADMIN — PIPERACILLIN AND TAZOBACTAM 3.38 GRAM(S): 4; .5 INJECTION, POWDER, LYOPHILIZED, FOR SOLUTION INTRAVENOUS at 03:40

## 2019-09-15 RX ADMIN — OXYCODONE AND ACETAMINOPHEN 1 TABLET(S): 5; 325 TABLET ORAL at 13:45

## 2019-09-15 RX ADMIN — Medication 4: at 22:13

## 2019-09-15 RX ADMIN — INSULIN HUMAN 8 UNIT(S): 100 INJECTION, SOLUTION SUBCUTANEOUS at 14:36

## 2019-09-15 RX ADMIN — OXYCODONE AND ACETAMINOPHEN 1 TABLET(S): 5; 325 TABLET ORAL at 21:20

## 2019-09-15 RX ADMIN — OXYCODONE AND ACETAMINOPHEN 1 TABLET(S): 5; 325 TABLET ORAL at 13:15

## 2019-09-15 RX ADMIN — Medication 3 UNIT(S): at 10:36

## 2019-09-15 RX ADMIN — Medication 3 UNIT(S): at 18:50

## 2019-09-15 RX ADMIN — ATORVASTATIN CALCIUM 20 MILLIGRAM(S): 80 TABLET, FILM COATED ORAL at 21:08

## 2019-09-15 RX ADMIN — Medication 100 MILLIGRAM(S): at 21:08

## 2019-09-15 RX ADMIN — Medication 0.5 MILLIGRAM(S): at 18:33

## 2019-09-15 RX ADMIN — Medication 6: at 10:35

## 2019-09-15 RX ADMIN — Medication 250 MILLIGRAM(S): at 03:58

## 2019-09-15 RX ADMIN — OXYCODONE AND ACETAMINOPHEN 1 TABLET(S): 5; 325 TABLET ORAL at 22:12

## 2019-09-15 RX ADMIN — Medication 25 MILLIGRAM(S): at 21:08

## 2019-09-15 RX ADMIN — ISOSORBIDE DINITRATE 10 MILLIGRAM(S): 5 TABLET ORAL at 21:08

## 2019-09-15 RX ADMIN — PIPERACILLIN AND TAZOBACTAM 200 GRAM(S): 4; .5 INJECTION, POWDER, LYOPHILIZED, FOR SOLUTION INTRAVENOUS at 02:52

## 2019-09-15 RX ADMIN — INSULIN GLARGINE 7 UNIT(S): 100 INJECTION, SOLUTION SUBCUTANEOUS at 20:36

## 2019-09-15 RX ADMIN — SODIUM CHLORIDE 250 MILLILITER(S): 9 INJECTION INTRAMUSCULAR; INTRAVENOUS; SUBCUTANEOUS at 04:50

## 2019-09-15 RX ADMIN — HEPARIN SODIUM 5000 UNIT(S): 5000 INJECTION INTRAVENOUS; SUBCUTANEOUS at 18:47

## 2019-09-15 RX ADMIN — Medication 3 MILLILITER(S): at 18:33

## 2019-09-15 RX ADMIN — Medication 650 MILLIGRAM(S): at 02:54

## 2019-09-15 RX ADMIN — Medication 2: at 18:51

## 2019-09-15 NOTE — ED ADULT NURSE REASSESSMENT NOTE - NS ED NURSE REASSESS COMMENT FT1
Dr Schmidt aware of patients FS. States give her the 6 units premeal + 3 additional premeal units as ordered. No further intervention for blood glucose at this time

## 2019-09-15 NOTE — ED ADULT NURSE REASSESSMENT NOTE - NS ED NURSE REASSESS COMMENT FT1
received report from ER RN Margaret. Patient found to be sitting up a&ox3 in no distress. States she is feeling "much better" than when she first arrived. Patients vitals are stable. Keeps removing nasal cannula- educated to keep it in her nose due to episode of hypoxia last night according to vital signs flowsheet. She denies SOB/chest pain. Do not use extremity band on left arm- dialysis M/T/thurs sat. She is admitted pending bed assignment. Call bell within reach. Educated to ask for assistance if she needs to get up as she is a fall risk. Verbalized understanding. Red socks on and yellow gown placed on patient. Will continue to monitor.

## 2019-09-15 NOTE — CONSULT NOTE ADULT - SUBJECTIVE AND OBJECTIVE BOX
HPI:   Patient is a 64y female with PMH significant for HLD, CAD, ESRD on HD, CHF, severe AS, COPD, CVA, PVD s/p b/l fem pop bypass, hx hilar adenopathy, DM, prior hospitalizations for DKA, who was admitted with parainfluenza multifocal pneumonia in Jun & was also empirically treated with Zosyn.    Now brought in     REVIEW OF SYSTEMS:  All other review of systems negative (Comprehensive ROS)    PAST MEDICAL & SURGICAL HISTORY:  COPD (chronic obstructive pulmonary disease)  Localized enlarged lymph nodes  CHF (congestive heart failure): EF 40-45%  Subclavian vein stenosis, left: s/p stent  DKA, type 1: 1/2015  ACS (acute coronary syndrome): 1/2015 - cath revealed 100% ostial stenosis not amenable to PCI - medical management  TIA (transient ischemic attack): x 2 - 8-9 years ago prior to ASD/VSD repair  CAD (coronary artery disease): s/p stents  Gout: past  CVA (cerebral infarction): with no residual, 8 yrs ago, prior to heart surgery - ST memory loss  Peripheral vascular disease: occluded left fem-pop bypass 5/2015  Diabetes mellitus type 1: Insulin Dependent -  ESRD (end stage renal disease): dialysis  M, tue, Thursday Saturday  Hyperlipidemia  Status post device closure of ASD: brenna  History of cardiac catheterization: 1/2015 - no intervention  S/P femoral-popliteal bypass surgery: L and R in 2013 with graft; 5/2015 CFA angioplasty left and ileofemoral endarterectomywith vein patch angioplasty of left fem-pop bypass graft  Multiple vascular surgery both leg, left fempop bypass revision 11/2015  AV (arteriovenous fistula): Left AV graft; revision with stent placement 2-3 years ago  S/P cholecystectomy      Allergies    No Known Allergies    Intolerances        Antimicrobials Day #  :    Other Medications:  acetaminophen   Tablet .. 650 milliGRAM(s) Oral every 6 hours PRN  ALBUTerol/ipratropium for Nebulization 3 milliLiter(s) Nebulizer every 6 hours  aspirin enteric coated 81 milliGRAM(s) Oral daily  atorvastatin 20 milliGRAM(s) Oral at bedtime  buDESOnide    Inhalation Suspension 0.5 milliGRAM(s) Inhalation two times a day  clopidogrel Tablet 75 milliGRAM(s) Oral daily  dextrose 40% Gel 15 Gram(s) Oral once PRN  dextrose 5%. 1000 milliLiter(s) IV Continuous <Continuous>  dextrose 50% Injectable 12.5 Gram(s) IV Push once  dextrose 50% Injectable 25 Gram(s) IV Push once  dextrose 50% Injectable 25 Gram(s) IV Push once  docusate sodium 100 milliGRAM(s) Oral three times a day  glucagon  Injectable 1 milliGRAM(s) IntraMuscular once PRN  heparin  Injectable 5000 Unit(s) SubCutaneous every 12 hours  hydrALAZINE 25 milliGRAM(s) Oral three times a day  insulin glargine Injectable (LANTUS) 7 Unit(s) SubCutaneous once  insulin lispro (HumaLOG) corrective regimen sliding scale   SubCutaneous every 6 hours  isosorbide   dinitrate Tablet (ISORDIL) 10 milliGRAM(s) Oral three times a day  metoprolol succinate ER 50 milliGRAM(s) Oral daily  oxyCODONE    5 mG/acetaminophen 325 mG 1 Tablet(s) Oral every 6 hours PRN  pantoprazole    Tablet 40 milliGRAM(s) Oral before breakfast  senna 2 Tablet(s) Oral at bedtime PRN      FAMILY HISTORY:  Family history of smoking  Family history of hypertension  Family history of cancer (Sibling)      SOCIAL HISTORY:  Smoking:     ETOH:     Drug Use:     Single     T(F): 97.9 (09-15-19 @ 12:35), Max: 101.6 (09-14-19 @ 23:20)  HR: 80 (09-15-19 @ 12:35)  BP: 137/71 (09-15-19 @ 12:35)  RR: 17 (09-15-19 @ 12:35)  SpO2: 99% (09-15-19 @ 12:35)  Wt(kg): --    PHYSICAL EXAM:  General: alert, no acute distress  Eyes:  anicteric, no conjunctival injection, no discharge  Oropharynx: no lesions or injection 	  Neck: supple, without adenopathy  Lungs: clear to auscultation  Heart: regular rate and rhythm; no murmur, rubs or gallops  Abdomen: soft, nondistended, nontender, without mass or organomegaly  Skin: no lesions  Extremities: no clubbing, cyanosis, or edema  Neurologic: alert, oriented, moves all extremities    LAB RESULTS:                        9.1    12.63 )-----------( 267      ( 15 Sep 2019 09:35 )             30.1     09-15    131<L>  |  84<L>  |  35<H>  ----------------------------<  456<HH>  6.2<HH>   |  14<L>  |  5.06<H>    Ca    9.9      15 Sep 2019 11:05    TPro  7.2  /  Alb  4.1  /  TBili  0.7  /  DBili  x   /  AST  61<H>  /  ALT  44  /  AlkPhos  125<H>  09-15    LIVER FUNCTIONS - ( 15 Sep 2019 11:05 )  Alb: 4.1 g/dL / Pro: 7.2 g/dL / ALK PHOS: 125 U/L / ALT: 44 U/L / AST: 61 U/L / GGT: x               MICROBIOLOGY:  RECENT CULTURES:        RADIOLOGY REVIEWED: HPI:   Patient is a 64y female with PMH significant for HLD, CAD, ESRD on HD, CHF, severe AS, COPD, CVA, PVD s/p b/l fem pop bypass, hx hilar adenopathy, DM, prior hospitalizations for DKA, who was admitted with parainfluenza multifocal pneumonia in Jun & was also empirically treated with Zosyn.    Now brought in fever and AMS. Upon arrival found febrile to 101.6F & with leukocytosis of 11.9K. Elevated troponin T & [place] [address]. CXR without new infiltrate. Resp viral panel positive for entero/rhino virus. She reports that her  also has same cold & cough     REVIEW OF SYSTEMS:  All other review of systems negative (Comprehensive ROS) except as above    PAST MEDICAL & SURGICAL HISTORY:  COPD (chronic obstructive pulmonary disease)  Localized enlarged lymph nodes  CHF (congestive heart failure): EF 40-45%  Subclavian vein stenosis, left: s/p stent  DKA, type 1: 1/2015  ACS (acute coronary syndrome): 1/2015 - cath revealed 100% ostial stenosis not amenable to PCI - medical management  TIA (transient ischemic attack): x 2 - 8-9 years ago prior to ASD/VSD repair  CAD (coronary artery disease): s/p stents  Gout: past  CVA (cerebral infarction): with no residual, 8 yrs ago, prior to heart surgery - ST memory loss  Peripheral vascular disease: occluded left fem-pop bypass 5/2015  Diabetes mellitus type 1: Insulin Dependent -  ESRD (end stage renal disease): dialysis  M, tue, Thursday Saturday  Hyperlipidemia  Status post device closure of ASD: brenna  History of cardiac catheterization: 1/2015 - no intervention  S/P femoral-popliteal bypass surgery: L and R in 2013 with graft; 5/2015 CFA angioplasty left and ileofemoral endarterectomy vein patch angioplasty of left fem-pop bypass graft  Multiple vascular surgery both leg, left fempop bypass revision 11/2015  AV (arteriovenous fistula): Left AV graft; revision with stent placement 2-3 years ago  S/P cholecystectomy      Allergies    No Known Allergies    Intolerances        Antimicrobials Day #  :    Other Medications:  acetaminophen   Tablet .. 650 milliGRAM(s) Oral every 6 hours PRN  ALBUTerol/ipratropium for Nebulization 3 milliLiter(s) Nebulizer every 6 hours  aspirin enteric coated 81 milliGRAM(s) Oral daily  atorvastatin 20 milliGRAM(s) Oral at bedtime  buDESOnide    Inhalation Suspension 0.5 milliGRAM(s) Inhalation two times a day  clopidogrel Tablet 75 milliGRAM(s) Oral daily  dextrose 40% Gel 15 Gram(s) Oral once PRN  dextrose 5%. 1000 milliLiter(s) IV Continuous <Continuous>  dextrose 50% Injectable 12.5 Gram(s) IV Push once  dextrose 50% Injectable 25 Gram(s) IV Push once  dextrose 50% Injectable 25 Gram(s) IV Push once  docusate sodium 100 milliGRAM(s) Oral three times a day  glucagon  Injectable 1 milliGRAM(s) IntraMuscular once PRN  heparin  Injectable 5000 Unit(s) SubCutaneous every 12 hours  hydrALAZINE 25 milliGRAM(s) Oral three times a day  insulin glargine Injectable (LANTUS) 7 Unit(s) SubCutaneous once  insulin lispro (HumaLOG) corrective regimen sliding scale   SubCutaneous every 6 hours  isosorbide   dinitrate Tablet (ISORDIL) 10 milliGRAM(s) Oral three times a day  metoprolol succinate ER 50 milliGRAM(s) Oral daily  oxyCODONE    5 mG/acetaminophen 325 mG 1 Tablet(s) Oral every 6 hours PRN  pantoprazole    Tablet 40 milliGRAM(s) Oral before breakfast  senna 2 Tablet(s) Oral at bedtime PRN      FAMILY HISTORY:  Family history of smoking  Family history of hypertension  Family history of cancer (Sibling)      SOCIAL HISTORY:  Smoking: No    ETOH: No    Drug Use: No         T(F): 97.9 (09-15-19 @ 12:35), Max: 101.6 (09-14-19 @ 23:20)  HR: 80 (09-15-19 @ 12:35)  BP: 137/71 (09-15-19 @ 12:35)  RR: 17 (09-15-19 @ 12:35)  SpO2: 99% (09-15-19 @ 12:35)  Wt(kg): --    PHYSICAL EXAM:  General: alert, no acute distress  Eyes:  anicteric, no conjunctival injection, no discharge  Neck: supple, without adenopathy  Lungs: clear to auscultation  Heart: regular rate and rhythm; no murmurs  Abdomen: soft, nondistended, nontender, without mass or organomegaly  Skin: no lesions  Extremities: no clubbing or cyanosis. bilateral edema +                    Left forearm AVF  Neurologic: alert, oriented, moves all extremities    LAB RESULTS:                        9.1    12.63 )-----------( 267      ( 15 Sep 2019 09:35 )             30.1     09-15    131<L>  |  84<L>  |  35<H>  ----------------------------<  456<HH>  6.2<HH>   |  14<L>  |  5.06<H>    Ca    9.9      15 Sep 2019 11:05    TPro  7.2  /  Alb  4.1  /  TBili  0.7  /  DBili  x   /  AST  61<H>  /  ALT  44  /  AlkPhos  125<H>  09-15    LIVER FUNCTIONS - ( 15 Sep 2019 11:05 )  Alb: 4.1 g/dL / Pro: 7.2 g/dL / ALK PHOS: 125 U/L / ALT: 44 U/L / AST: 61 U/L / GGT: x               MICROBIOLOGY:  RECENT CULTURES:        RADIOLOGY REVIEWED:

## 2019-09-15 NOTE — ED ADULT NURSE REASSESSMENT NOTE - NS ED NURSE REASSESS COMMENT FT1
patient has MICU bed. Called dialysis- Avani CARMONA states patient to go to MICU after dialysis in about 1 hour. MICU MD evaluated patient upstairs.

## 2019-09-15 NOTE — H&P ADULT - NSHPLABSRESULTS_GEN_ALL_CORE
9.1    12.63 )-----------( 267      ( 15 Sep 2019 09:35 )             30.1       09-15    136  |  89<L>  |  32<H>  ----------------------------<  417<H>  6.0<H>   |  22  |  4.78<H>    Ca    9.5      15 Sep 2019 07:10    TPro  7.5  /  Alb  4.3  /  TBili  0.4  /  DBili  x   /  AST  63<H>  /  ALT  43  /  AlkPhos  119  09-14                  PT/INR - ( 14 Sep 2019 23:50 )   PT: 13.3 sec;   INR: 1.15 ratio         PTT - ( 14 Sep 2019 23:50 )  PTT:28.0 sec    Rapid RVP Result: Detected: This Respiratory Panel uses polymerase chain reaction (PCR) to detect for  adenovirus; coronavirus (HKU1, NL63, 229E, OC43); human metapneumovirus  (hMPV); human enterovirus/rhinovirus (Entero/RV); influenza A; influenza  A/H1; influenza A/H3; influenza A/H1-2009; influenza B; parainfluenza  viruses 1, 2, 3, 4; respiratory syncytial virus; Mycoplasma pneumoniae;  and Chlamydophila pneumoniae. (09.14.19 @ 23:50)    EKG SR T inv lat leads     < from: Xray Chest 1 View-PORTABLE IMMEDIATE (09.14.19 @ 23:33) >    IMPRESSION: Left basilar linear opacity is similar to the prior study.   Lungs otherwise clear.    < end of copied text >

## 2019-09-15 NOTE — H&P ADULT - HISTORY OF PRESENT ILLNESS
63 y/o F w/ PMHx ESRD (HD M/T/T/Sat), IDDM, CHF, CAD, ischemic cardiomyopathy presents to the ED BIBA for fever and AMS. She also reports a productive cough w/ yellow sputum for "days". She otherwise had her full course of dialysis today and reports chills at that time.

## 2019-09-15 NOTE — H&P ADULT - NSHPSOCIALHISTORY_GEN_ALL_CORE
Social History:    Marital Status:  ( x  )    (   ) Single    (   )    (  )   Occupation:   Lives with: (  ) alone  (  ) children   ( x ) spouse   (  ) parents  (  ) other    Substance Use (street drugs): (x  ) never used  (  ) other:  Tobacco Usage:  ( x  ) never smoked   (   ) former smoker   (   ) current smoker  (     ) pack years  (        ) last cigarette date  Alcohol Usage: dednies    (     ) Advanced Directives: (     ) None    (      ) DNR    (     ) DNI    (     ) Health Care Proxy:

## 2019-09-15 NOTE — PROGRESS NOTE ADULT - SUBJECTIVE AND OBJECTIVE BOX
PULMONARY PROGRESS NOTE    NATHAN MEEKS  MRN-[id number]    Patient is a 64y old  Female who presents with a chief complaint of sob (15 Sep 2019 10:16)      HPI:  -"i have the worst cold ever", cough with sputum, sob.    ROS:   -no N/V    ACTIVE MEDICATION LIST:  MEDICATIONS  (STANDING):  ALBUTerol/ipratropium for Nebulization 3 milliLiter(s) Nebulizer every 6 hours  aspirin enteric coated 81 milliGRAM(s) Oral daily  atorvastatin 20 milliGRAM(s) Oral at bedtime  clopidogrel Tablet 75 milliGRAM(s) Oral daily  dextrose 5%. 1000 milliLiter(s) (50 mL/Hr) IV Continuous <Continuous>  dextrose 50% Injectable 12.5 Gram(s) IV Push once  dextrose 50% Injectable 25 Gram(s) IV Push once  dextrose 50% Injectable 25 Gram(s) IV Push once  docusate sodium 100 milliGRAM(s) Oral three times a day  heparin  Injectable 5000 Unit(s) SubCutaneous every 12 hours  hydrALAZINE 25 milliGRAM(s) Oral three times a day  insulin glargine Injectable (LANTUS) 5 Unit(s) SubCutaneous at bedtime  insulin lispro (HumaLOG) corrective regimen sliding scale   SubCutaneous three times a day before meals  insulin lispro (HumaLOG) corrective regimen sliding scale   SubCutaneous at bedtime  insulin lispro Injectable (HumaLOG) 3 Unit(s) SubCutaneous three times a day before meals  isosorbide   dinitrate Tablet (ISORDIL) 10 milliGRAM(s) Oral three times a day  metoprolol succinate ER 50 milliGRAM(s) Oral daily  pantoprazole    Tablet 40 milliGRAM(s) Oral before breakfast    MEDICATIONS  (PRN):  acetaminophen   Tablet .. 650 milliGRAM(s) Oral every 6 hours PRN Temp greater or equal to 38.5C (101.3F), Mild Pain (1 - 3)  dextrose 40% Gel 15 Gram(s) Oral once PRN Blood Glucose LESS THAN 70 milliGRAM(s)/deciliter  glucagon  Injectable 1 milliGRAM(s) IntraMuscular once PRN Glucose LESS THAN 70 milligrams/deciliter  oxyCODONE    5 mG/acetaminophen 325 mG 1 Tablet(s) Oral every 6 hours PRN Moderate Pain (4 - 6)  senna 2 Tablet(s) Oral at bedtime PRN Constipation      EXAM:  Vital Signs Last 24 Hrs  T(C): 36.6 (15 Sep 2019 11:44), Max: 38.7 (14 Sep 2019 23:20)  T(F): 97.9 (15 Sep 2019 11:44), Max: 101.6 (14 Sep 2019 23:20)  HR: 95 (15 Sep 2019 11:44) (78 - 110)  BP: 124/73 (15 Sep 2019 11:44) (118/55 - 141/80)  BP(mean): 76 (15 Sep 2019 06:10) (73 - 98)  RR: 16 (15 Sep 2019 11:44) (15 - 35)  SpO2: 98% (15 Sep 2019 11:44) (87% - 100%)    GENERAL: The patient is awake and alert in no apparent distress.     LUNGS: bilateral wheeze    HEART: S1/S2    LABS/IMAGING: reviewed                        9.1    12.63 )-----------( 267      ( 15 Sep 2019 09:35 )             30.1   09-15    136  |  89<L>  |  32<H>  ----------------------------<  417<H>  6.0<H>   |  22  |  4.78<H>    Ca    9.5      15 Sep 2019 07:10    TPro  7.5  /  Alb  4.3  /  TBili  0.4  /  DBili  x   /  AST  63<H>  /  ALT  43  /  AlkPhos  119  09-14    +RVP    < from: Xray Chest 1 View-PORTABLE IMMEDIATE (09.14.19 @ 23:33) >  EXAM:  XR CHEST PORTABLE IMMED 1V                            PROCEDURE DATE:  09/14/2019            INTERPRETATION:  CLINICAL INFORMATION: Sepsis, altered mental status.    TECHNIQUE: Single AP view of the chest 9/14/2019 11:33 PM.    COMPARISON: Chest radiograph 7/29/2019.    FINDINGS:  Left subclavian vascular stent. Coronary artery stent.  Left basilar linear opacity is similar to the prior study. Lungs   otherwise clear.  No pleural effusions. No pneumothorax.  Cardiac size cannot accurately be assessed in this projection.     IMPRESSION: Left basilar linear opacity is similar to the prior study.   Lungs otherwise clear.    < end of copied text >  < from: Xray Chest 1 View-PORTABLE IMMEDIATE (09.14.19 @ 23:33) >  EXAM:  XR CHEST PORTABLE IMMED 1V                            PROCEDURE DATE:  09/14/2019            INTERPRETATION:  CLINICAL INFORMATION: Sepsis, altered mental status.    TECHNIQUE: Single AP view of the chest 9/14/2019 11:33 PM.    COMPARISON: Chest radiograph 7/29/2019.    FINDINGS:  Left subclavian vascular stent. Coronary artery stent.  Left basilar linear opacity is similar to the prior study. Lungs   otherwise clear.  No pleural effusions. No pneumothorax.  Cardiac size cannot accurately be assessed in this projection.     IMPRESSION: Left basilar linear opacity is similar to the prior study.   Lungs otherwise clear.    < end of copied text >        PROBLEM LIST:  64y Female with HEALTH ISSUES - PROBLEM Dx:  COPD  Enterovirus infection  ESRD on HD  DM  HTN  HLD    RECS:  -supportive care for enterovirus  -cont duonebs Q6, will add pulmicort BID, will hold off on systemic steroids for now given DM.    -renal fu for HD  -supplemental O2  -incentive juni    Alem Tate MD   [phone]

## 2019-09-15 NOTE — CONSULT NOTE ADULT - SUBJECTIVE AND OBJECTIVE BOX
[facility]  [phone]  NEPHROLOGY      INITIAL CONSULT NOTE  --------------------------------------------------------------------------------  HPI:      65 y/o F w/ PMHx ESRD (HD M/T/T/Sat), IDDM, CHF, CAD, ischemic cardiomyopathy presents to the ED BIBA for fever. She also reports a productive cough w/ yellow sputum for "days". She otherwise had her full course of dialysis yesterday  and reports chills at that time.  in er with hyperkalemia and hyperglycemia. renal consult called.  did not take her lantus last night     PAST HISTORY  --------------------------------------------------------------------------------  PAST MEDICAL & SURGICAL HISTORY:  COPD (chronic obstructive pulmonary disease)  Localized enlarged lymph nodes  CHF (congestive heart failure): EF 40-45%  Subclavian vein stenosis, left: s/p stent  DKA, type 1: 1/2015  ACS (acute coronary syndrome): 1/2015 - cath revealed 100% ostial stenosis not amenable to PCI - medical management  TIA (transient ischemic attack): x 2 - 8-9 years ago prior to ASD/VSD repair  CAD (coronary artery disease): s/p stents  Gout: past  CVA (cerebral infarction): with no residual, 8 yrs ago, prior to heart surgery - ST memory loss  Peripheral vascular disease: occluded left fem-pop bypass 5/2015  Diabetes mellitus type 1: Insulin Dependent -  ESRD (end stage renal disease): dialysis  M, tue, thursday, saturday  Hyperlipidemia  Status post device closure of ASD: &quot;clamshell&quot;  History of cardiac catheterization: 1/2015 - no intervention  S/P femoral-popliteal bypass surgery: L and R in 2013 with graft; 5/2015 CFA angioplasty left and ileofemoral endarterectomywith vein patch angioplasty of left fem-pop bypass graft  Multiple vascular surgery both leg, left fempop bypass revision 11/2015  AV (arteriovenous fistula): Left AV graft; revision with stent placement 2-3 years ago  S/P cholecystectomy    FAMILY HISTORY:  Family history of smoking  Family history of hypertension  Family history of cancer (Sibling)    PAST SOCIAL HISTORY: past tobacco use     ALLERGIES & MEDICATIONS  --------------------------------------------------------------------------------  Allergies    No Known Allergies    Intolerances      Standing Inpatient Medications  ALBUTerol/ipratropium for Nebulization 3 milliLiter(s) Nebulizer every 6 hours  aspirin enteric coated 81 milliGRAM(s) Oral daily  atorvastatin 20 milliGRAM(s) Oral at bedtime  buDESOnide    Inhalation Suspension 0.5 milliGRAM(s) Inhalation two times a day  clopidogrel Tablet 75 milliGRAM(s) Oral daily  dextrose 5%. 1000 milliLiter(s) IV Continuous <Continuous>  dextrose 50% Injectable 12.5 Gram(s) IV Push once  dextrose 50% Injectable 25 Gram(s) IV Push once  dextrose 50% Injectable 25 Gram(s) IV Push once  docusate sodium 100 milliGRAM(s) Oral three times a day  heparin  Injectable 5000 Unit(s) SubCutaneous every 12 hours  hydrALAZINE 25 milliGRAM(s) Oral three times a day  insulin glargine Injectable (LANTUS) 7 Unit(s) SubCutaneous once  insulin lispro (HumaLOG) corrective regimen sliding scale   SubCutaneous every 6 hours  isosorbide   dinitrate Tablet (ISORDIL) 10 milliGRAM(s) Oral three times a day  metoprolol succinate ER 50 milliGRAM(s) Oral daily  pantoprazole    Tablet 40 milliGRAM(s) Oral before breakfast    PRN Inpatient Medications  acetaminophen   Tablet .. 650 milliGRAM(s) Oral every 6 hours PRN  dextrose 40% Gel 15 Gram(s) Oral once PRN  glucagon  Injectable 1 milliGRAM(s) IntraMuscular once PRN  oxyCODONE    5 mG/acetaminophen 325 mG 1 Tablet(s) Oral every 6 hours PRN  senna 2 Tablet(s) Oral at bedtime PRN      REVIEW OF SYSTEMS  --------------------------------------------------------------------------------  Gen: +   fevers/chills   Skin: No rashes  Head/Eyes/Ears/Mouth: No headache; Normal hearing;  No sinus pain/discomfort, sore throat  Respiratory: +  dyspnea,  +  cough, +  wheezing, hemoptysis -   CV: No chest pain, or palp   GI: No abdominal pain, diarrhea, nausea, vomiting, melena  : No dysuria,  hematuria,  MSK: No joint pain/swelling; no back pain  Neuro: No dizziness/lightheadedness,   also with  +  edema     All other systems were reviewed and are negative, except as noted.    VITALS/PHYSICAL EXAM  --------------------------------------------------------------------------------  T(C): 36.6 (09-15-19 @ 12:35), Max: 38.7 (09-14-19 @ 23:20)  HR: 80 (09-15-19 @ 12:35) (78 - 110)  BP: 137/71 (09-15-19 @ 12:35) (118/55 - 141/80)  RR: 17 (09-15-19 @ 12:35) (15 - 35)  SpO2: 99% (09-15-19 @ 12:35) (87% - 100%)  Wt(kg): --  Height (cm): 162.56 (09-14-19 @ 22:43)  Weight (kg): 63.5 (09-14-19 @ 22:43)  BMI (kg/m2): 24 (09-14-19 @ 22:43)  BSA (m2): 1.68 (09-14-19 @ 22:43)      Physical Exam:  	Gen: ill appearing coughing   	+ JVD  	Pulm: decrease bs  coarse bs  wheezing +   	CV: RRR, S1S2; no rub  	Back: No CVA tenderness   	Abd: +BS, soft, nontender/nondistended  	: No suprapubic tenderness  	UE: Warm, no cyanosis  no clubbing,  no edema  	LE: Warm, no cyanosis  no clubbing, 2+ pitting LLE > RLE  edema  	Neuro: somewhat lethargic   	Skin: Warm, no decrease skin turgor   	Vascular access: + avf + bruit and thrill     LABS/STUDIES  --------------------------------------------------------------------------------              9.1    12.63 >-----------<  267      [09-15-19 @ 09:35]              30.1     131  |  84  |  35  ----------------------------<  456      [09-15-19 @ 11:05]  6.2   |  14  |  5.06        Ca     9.9     [09-15-19 @ 11:05]    TPro  7.2  /  Alb  4.1  /  TBili  0.7  /  DBili  x   /  AST  61  /  ALT  44  /  AlkPhos  125  [09-15-19 @ 11:05]    PT/INR: PT 13.3 , INR 1.15       [09-14-19 @ 23:50]  PTT: 28.0       [09-14-19 @ 23:50]          [09-15-19 @ 11:05]    Creatinine Trend:  SCr 5.06 [09-15 @ 11:05]  SCr 4.78 [09-15 @ 07:10]  SCr 4.17 [09-14 @ 23:55]    Urinalysis - [06-04-17 @ 08:24]      Color Yellow / Appearance Clear / SG 1.013 / pH 8.5      Gluc 1000 / Ketone Negative  / Bili Negative / Urobili Negative       Blood Negative / Protein >600 / Leuk Est Small / Nitrite Negative      RBC 3-5 / WBC 6-10 / Hyaline  / Gran  / Sq Epi  / Non Sq Epi OCC / Bacteria       Iron 42, TIBC 253, %sat 17      [06-17-19 @ 17:54]  Ferritin 1838      [06-17-19 @ 18:06]  PTH -- (Ca 9.6)      [06-17-19 @ 18:06]   177  PTH -- (Ca 7.8)      [03-29-19 @ 20:38]   73  PTH -- (Ca 7.3)      [02-22-19 @ 02:49]   59  HbA1c 8.2      [06-16-19 @ 13:24]  TSH 0.71      [11-17-18 @ 08:25]    HBsAb <3.0      [07-31-19 @ 22:55]  HBsAb Nonreact      [07-31-19 @ 22:55]  HBsAg Nonreact      [07-31-19 @ 22:55]  HBcAb Reactive      [07-31-19 @ 22:55]  HCV 0.11, Nonreact      [07-31-19 @ 22:55]

## 2019-09-15 NOTE — CHART NOTE - NSCHARTNOTEFT_GEN_A_CORE
Patient type 1DM on Basaglar 4units at bedtime and Humalog 3 units premeal as reported by pt.  Patient reported taking Basaglar 4units at bedtime on 9/14.   Patient currently alert and oriented x3, NAD, vss. Reported good oral intake.  Diet, Humalog premeal and low correction scale ordered.  Endocrine to be called in am to assist in conversion of Basaglar to Lantus  trend FS  monitor for s/s hypo/hyperglycemia  H&P/orders pending by Attending    Anna Jimenez [facility]  [id number]

## 2019-09-15 NOTE — CONSULT NOTE ADULT - ATTENDING COMMENTS
Sky Kaur MD   Pager # [phone]  On evenings and weekends, please call the office at [phone] or page endocrine fellow on call. Please note that this patient may be followed by different provider tomorrow. If no answer, contact the office.

## 2019-09-15 NOTE — PROGRESS NOTE ADULT - REASON FOR ADMISSION
chest pain, SOB
WDL

## 2019-09-15 NOTE — CONSULT NOTE ADULT - PROBLEM SELECTOR RECOMMENDATION 2
- given presence of DKA, recommend she remain NPO  - administer stat dose of Lantus 7 units now  - administer low correction scale q6  - once no longer in DKA, can transition back to basal bolus insulin - Lantus 7 units daily and Humalog 3 units TID  - consistent carb diet when no longer in DKA  - will follow  - for discharge: dc on basal bolus insulin. To follow with Dr. Ley, her private endocrinologist.

## 2019-09-15 NOTE — CONSULT NOTE ADULT - ASSESSMENT
64 year old woman with PMH uncontrolled DM1, HTN, HLD, ESRD (HD M/T/T/Sat), CHF, CAD, ischemic cardiomyopathy presents with fever and AMS, now with DKA in the setting of uncontrolled DM1 and missed basal insulin dose. Patient is a high risk patient with high level decision making.
65 y/o F w/ PMHx ESRD (HD M/T/T/Sat), IDDM, CHF, CAD, ischemic cardiomyopathy presents to the ED BIBA for fever. pt also in DKA, hyperglycemia and now with hyperkalemia as well as DKA    1- esrd  2- hyperkalemia  3- DKA  4- HTN   5- chf      hd consent obtained from pt and placed in chart   endocrine consult   insulin drip   icu consult   hd for hyperkalemia   given fevers and sepsis to remove one liter fluid with hd and then further fluid removal in am   unable to give ivf for DKA at present given pt is already fluid loaded  bcx
64y female with PMH significant for HLD, CAD, ESRD on HD, CHF, severe AS, COPD, CVA, PVD s/p b/l fem pop bypass, hx hilar adenopathy, DM, prior hospitalizations for DKA, who was admitted with parainfluenza multifocal pneumonia in [place] & was also empirically treated with Zosyn.  Now brought in fever and AMS.   Found febrile to 101.6F & with leukocytosis of 11.9K.   CXR without new infiltrate.   Resp viral panel positive for entero/rhino virus.   Elevated troponin [place],  [place] [address]   Hyperglycemic with acidosis ? DKA  She reports that her  also has same cold & cough     PLAN:  Suspect viral URI with sepsis responsible for illness  Continue antibiotics pending further incubation of cx  Follow cx

## 2019-09-15 NOTE — CONSULT NOTE ADULT - PROBLEM SELECTOR RECOMMENDATION 9
- patient currently in DKA after not receiving basal insulin dose last night in ED  - will make her NPO  - administer stat dose of Lantus 7 units and start Humalog low correction scale q6 (not q4 given presence of ESRD)  - repeat labs for DKA (CMP, BHB) 2 hours after receiving Lantus dose  - recommend MICU evaluation   - will follow

## 2019-09-15 NOTE — CHART NOTE - NSCHARTNOTEFT_GEN_A_CORE
CHIEF COMPLAINT: DKA    HPI:  65 y/o F w/ PMHx ESRD (HD M/T/T/Sat), IDDM, CHF, CAD, ischemic cardiomyopathy presents to the ED BIBA for fever and AMS. She also reports a productive cough w/ yellow sputum for "days". She otherwise had her full course of dialysis today and reports chills at that time.    In the ED, WBC was 11.9, Hb 10.4 and repeat was WBC 12.63 with Hb 9.1. Troponin [id number]. Blood sugars were >300 with beta hydroxy-butyrate of 5.9. Subsequently, her blood sugars decreased and BMP showed Na 131, K 6.2 (slightly hemolyzed) Cl 84, CO2 14, BUN 35 and Cr 5.06 with anion gap of 33 (which has been opening more). Patient missed lantus dose yesterday. Patient went to get her routine hemodialysis as well before coming to the MICU. CXR at the time showed Left basilar linear opacity is similar to the prior study. Rapid RVP result was positive for entero/rhinovirus. ABG showed pH 7.41, pCO2 49, pO2 26, HCO3 31. BNP is 35,000. CK 24 and CKMB is 5.9. in the ED, he received Vanc/Zosyn.    PAST MEDICAL & SURGICAL HISTORY:  COPD (chronic obstructive pulmonary disease)  Localized enlarged lymph nodes  CHF (congestive heart failure): EF 40-45%  Subclavian vein stenosis, left: s/p stent  DKA, type 1: 1/2015  ACS (acute coronary syndrome): 1/2015 - cath revealed 100% ostial stenosis not amenable to PCI - medical management  TIA (transient ischemic attack): x 2 - 8-9 years ago prior to ASD/VSD repair  CAD (coronary artery disease): s/p stents  Gout: past  CVA (cerebral infarction): with no residual, 8 yrs ago, prior to heart surgery - ST memory loss  Peripheral vascular disease: occluded left fem-pop bypass 5/2015  Diabetes mellitus type 1: Insulin Dependent -  ESRD (end stage renal disease): dialysis  M, tue, thursday, saturday  Hyperlipidemia  Status post device closure of ASD: &quot;clamshell&quot;  History of cardiac catheterization: 1/2015 - no intervention  S/P femoral-popliteal bypass surgery: L and R in 2013 with graft; 5/2015 CFA angioplasty left and ileofemoral endarterectomywith vein patch angioplasty of left fem-pop bypass graft  Multiple vascular surgery both leg, left fempop bypass revision 11/2015  AV (arteriovenous fistula): Left AV graft; revision with stent placement 2-3 years ago  S/P cholecystectomy      FAMILY HISTORY:  Family history of smoking  Family history of hypertension  Family history of cancer (Sibling)      SOCIAL HISTORY:  Smoking: [ ] Never Smoked [ ] Former Smoker (__ packs x ___ years) [ ] Current Smoker  (__ packs x ___ years)  Substance Use: [ ] Never Used [ ] Used ____  EtOH Use:  Marital Status: [ ] Single [ ]  [ ]  [ ]   Sexual History:   Occupation:  Recent Travel:  Country of Birth:  Advance Directives:    Allergies    No Known Allergies    Intolerances        HOME MEDICATIONS:    REVIEW OF SYSTEMS:  Constitutional: [ ] fevers [ ] chills [ ] weight loss [ ] weight gain  HEENT: [ ] dry eyes [ ] eye irritation [ ] postnasal drip [ ] nasal congestion  CV: [ ] chest pain [ ] orthopnea [ ] palpitations [ ] murmur  Resp: [ ] cough [ ] shortness of breath [ ] dyspnea [ ] wheezing [ ] sputum [ ] hemoptysis  GI: [ ] nausea [ ] vomiting [ ] diarrhea [ ] constipation [ ] abd pain [ ] dysphagia   : [ ] dysuria [ ] nocturia [ ] hematuria [ ] increased urinary frequency  Musculoskeletal: [ ] back pain [ ] myalgias [ ] arthralgias [ ] fracture  Skin: [ ] rash [ ] itch  Neurological: [ ] headache [ ] dizziness [ ] syncope [ ] weakness [ ] numbness  Psychiatric: [ ] anxiety [ ] depression  Endocrine: [ ] diabetes [ ] thyroid problem  Hematologic/Lymphatic: [ ] anemia [ ] bleeding problem  Allergic/Immunologic: [ ] itchy eyes [ ] nasal discharge [ ] hives [ ] angioedema  [ ] All other systems negative  [ ] Unable to assess ROS because ________    OBJECTIVE:  ICU Vital Signs Last 24 Hrs  T(C): 36.6 (15 Sep 2019 16:30), Max: 38.7 (14 Sep 2019 23:20)  T(F): 97.8 (15 Sep 2019 16:30), Max: 101.6 (14 Sep 2019 23:20)  HR: 77 (15 Sep 2019 16:30) (77 - 110)  BP: 140/63 (15 Sep 2019 16:30) (118/55 - 141/80)  BP(mean): 91 (15 Sep 2019 16:30) (73 - 98)  ABP: --  ABP(mean): --  RR: 27 (15 Sep 2019 16:30) (15 - 35)  SpO2: 94% (15 Sep 2019 16:30) (87% - 100%)        CAPILLARY BLOOD GLUCOSE      POCT Blood Glucose.: 174 mg/dL (15 Sep 2019 16:12)      PHYSICAL EXAM:  General:   HEENT:   Lymph Nodes:  Neck:   Respiratory:   Cardiovascular:   Abdomen:   Extremities:   Skin:   Neurological:  Psychiatry:    LINES:     HOSPITAL MEDICATIONS:  MEDICATIONS  (STANDING):  ALBUTerol/ipratropium for Nebulization 3 milliLiter(s) Nebulizer every 6 hours  aspirin enteric coated 81 milliGRAM(s) Oral daily  atorvastatin 20 milliGRAM(s) Oral at bedtime  buDESOnide    Inhalation Suspension 0.5 milliGRAM(s) Inhalation two times a day  chlorhexidine 4% Liquid 1 Application(s) Topical <User Schedule>  clopidogrel Tablet 75 milliGRAM(s) Oral daily  dextrose 5%. 1000 milliLiter(s) (50 mL/Hr) IV Continuous <Continuous>  dextrose 50% Injectable 12.5 Gram(s) IV Push once  dextrose 50% Injectable 25 Gram(s) IV Push once  dextrose 50% Injectable 25 Gram(s) IV Push once  docusate sodium 100 milliGRAM(s) Oral three times a day  heparin  Injectable 5000 Unit(s) SubCutaneous every 12 hours  hydrALAZINE 25 milliGRAM(s) Oral three times a day  insulin glargine Injectable (LANTUS) 7 Unit(s) SubCutaneous once  insulin lispro (HumaLOG) corrective regimen sliding scale   SubCutaneous every 6 hours  isosorbide   dinitrate Tablet (ISORDIL) 10 milliGRAM(s) Oral three times a day  metoprolol succinate ER 50 milliGRAM(s) Oral daily  pantoprazole    Tablet 40 milliGRAM(s) Oral before breakfast    MEDICATIONS  (PRN):  acetaminophen   Tablet .. 650 milliGRAM(s) Oral every 6 hours PRN Temp greater or equal to 38.5C (101.3F), Mild Pain (1 - 3)  dextrose 40% Gel 15 Gram(s) Oral once PRN Blood Glucose LESS THAN 70 milliGRAM(s)/deciliter  glucagon  Injectable 1 milliGRAM(s) IntraMuscular once PRN Glucose LESS THAN 70 milligrams/deciliter  oxyCODONE    5 mG/acetaminophen 325 mG 1 Tablet(s) Oral every 6 hours PRN Moderate Pain (4 - 6)  senna 2 Tablet(s) Oral at bedtime PRN Constipation      LABS:                        9.1    12.63 )-----------( 267      ( 15 Sep 2019 09:35 )             30.1     Hgb Trend: 9.1<--, 10.4<--  09-15    131<L>  |  84<L>  |  35<H>  ----------------------------<  456<HH>  6.2<HH>   |  14<L>  |  5.06<H>    Ca    9.9      15 Sep 2019 11:05    TPro  7.2  /  Alb  4.1  /  TBili  0.7  /  DBili  x   /  AST  61<H>  /  ALT  44  /  AlkPhos  125<H>  09-15    Creatinine Trend: 5.06<--, 4.78<--, 4.17<--  PT/INR - ( 14 Sep 2019 23:50 )   PT: 13.3 sec;   INR: 1.15 ratio         PTT - ( 14 Sep 2019 23:50 )  PTT:28.0 sec      Venous Blood Gas:  09-14 @ 23:29  7.41/49/26/31/39  VBG Lactate: 1.7      MICROBIOLOGY:     RADIOLOGY:  [ ] Reviewed and interpreted by me    EKG:        ASSESSMENT AND PLAN    65 y/o F w/ PMHx ESRD (HD M/T/T/Sat), IDDM, CHF, CAD, ischemic cardiomyopathy presents to the ED with upper respiratory illness and DKA.     #Neuro  -A&Ox4    #Endo  - After dialysis (where she received insulin), her blood sugar was 172.   - BMP q4h  - AG 33  - Started on insulin 2units/hr with D5 at 50cc/hr.   - f/u acetone, beta hydroxy-butyrate    #Resp  -duonebs and pulmicort     #CV  - hx of ischemic CMP  - c/w plavix 75mg oral daily  - lipitor 20 oral daily  - hydralazine 25mg TID, isosorbide dinitrate 10mg TID, metoprolol 50 oral daily    #GI  - protonix 40mg before breakfast  - keeping NPO right now for DKA    #ID  - s/p 1 dose of vanc and zosyn  - RVP positive for rhinovirus  - no need for abx right now  - f/u UA    #Renal  - s/p hemodialysis to remove 1L of fluid  - will f/u renal recs    #Heme  - hemoglobin stable, will c/w to monitor    #DVT PPx  - heparin subq 500 BID. CHIEF COMPLAINT: DKA    HPI:  65 y/o F w/ PMHx ESRD (HD M/T/T/Sat), IDDM, CHF, CAD, ischemic cardiomyopathy presents to the ED BIBA for fever and AMS. She also reports a productive cough w/ yellow sputum for "days". She otherwise had her full course of dialysis today and reports chills at that time.    In the ED, she had a fever of 101.6, WBC was 11.9, Hb 10.4 and repeat was WBC 12.63 with Hb 9.1. Troponin [id number]. Blood sugars were >300 with beta hydroxy-butyrate of 5.9. Subsequently, her blood sugars decreased and BMP showed Na 131, K 6.2 (slightly hemolyzed) Cl 84, CO2 14, BUN 35 and Cr 5.06 with anion gap of 33 (which has been opening more). Patient missed lantus dose yesterday. Patient went to get her routine hemodialysis as well before coming to the MICU. CXR at the time showed Left basilar linear opacity is similar to the prior study. Rapid RVP result was positive for entero/rhinovirus. ABG showed pH 7.41, pCO2 49, pO2 26, HCO3 31. BNP is 35,000. CK 24 and CKMB is 5.9. in the ED, he received Vanc/Zosyn.    PAST MEDICAL & SURGICAL HISTORY:  COPD (chronic obstructive pulmonary disease)  Localized enlarged lymph nodes  CHF (congestive heart failure): EF 40-45%  Subclavian vein stenosis, left: s/p stent  DKA, type 1: 1/2015  ACS (acute coronary syndrome): 1/2015 - cath revealed 100% ostial stenosis not amenable to PCI - medical management  TIA (transient ischemic attack): x 2 - 8-9 years ago prior to ASD/VSD repair  CAD (coronary artery disease): s/p stents  Gout: past  CVA (cerebral infarction): with no residual, 8 yrs ago, prior to heart surgery - ST memory loss  Peripheral vascular disease: occluded left fem-pop bypass 5/2015  Diabetes mellitus type 1: Insulin Dependent -  ESRD (end stage renal disease): dialysis  M, tue, thursday, saturday  Hyperlipidemia  Status post device closure of ASD: &quot;clamshell&quot;  History of cardiac catheterization: 1/2015 - no intervention  S/P femoral-popliteal bypass surgery: L and R in 2013 with graft; 5/2015 CFA angioplasty left and ileofemoral endarterectomywith vein patch angioplasty of left fem-pop bypass graft  Multiple vascular surgery both leg, left fempop bypass revision 11/2015  AV (arteriovenous fistula): Left AV graft; revision with stent placement 2-3 years ago  S/P cholecystectomy      FAMILY HISTORY:  Family history of smoking  Family history of hypertension  Family history of cancer (Sibling)      SOCIAL HISTORY:  Smoking: [ ] Never Smoked [ ] Former Smoker (__ packs x ___ years) [ ] Current Smoker  (__ packs x ___ years)  Substance Use: [ ] Never Used [ ] Used ____  EtOH Use:  Marital Status: [ ] Single [ ]  [ ]  [ ]   Sexual History:   Occupation:  Recent Travel:  Country of Birth:  Advance Directives:    Allergies    No Known Allergies    Intolerances        HOME MEDICATIONS:    REVIEW OF SYSTEMS:  Constitutional: [ ] fevers [ ] chills [ ] weight loss [ ] weight gain  HEENT: [ ] dry eyes [ ] eye irritation [ ] postnasal drip [ ] nasal congestion  CV: [ ] chest pain [ ] orthopnea [ ] palpitations [ ] murmur  Resp: [ ] cough [ ] shortness of breath [ ] dyspnea [ ] wheezing [ ] sputum [ ] hemoptysis  GI: [ ] nausea [ ] vomiting [ ] diarrhea [ ] constipation [ ] abd pain [ ] dysphagia   : [ ] dysuria [ ] nocturia [ ] hematuria [ ] increased urinary frequency  Musculoskeletal: [ ] back pain [ ] myalgias [ ] arthralgias [ ] fracture  Skin: [ ] rash [ ] itch  Neurological: [ ] headache [ ] dizziness [ ] syncope [ ] weakness [ ] numbness  Psychiatric: [ ] anxiety [ ] depression  Endocrine: [ ] diabetes [ ] thyroid problem  Hematologic/Lymphatic: [ ] anemia [ ] bleeding problem  Allergic/Immunologic: [ ] itchy eyes [ ] nasal discharge [ ] hives [ ] angioedema  [ ] All other systems negative  [ ] Unable to assess ROS because ________    OBJECTIVE:  ICU Vital Signs Last 24 Hrs  T(C): 36.6 (15 Sep 2019 16:30), Max: 38.7 (14 Sep 2019 23:20)  T(F): 97.8 (15 Sep 2019 16:30), Max: 101.6 (14 Sep 2019 23:20)  HR: 77 (15 Sep 2019 16:30) (77 - 110)  BP: 140/63 (15 Sep 2019 16:30) (118/55 - 141/80)  BP(mean): 91 (15 Sep 2019 16:30) (73 - 98)  ABP: --  ABP(mean): --  RR: 27 (15 Sep 2019 16:30) (15 - 35)  SpO2: 94% (15 Sep 2019 16:30) (87% - 100%)        CAPILLARY BLOOD GLUCOSE      POCT Blood Glucose.: 174 mg/dL (15 Sep 2019 16:12)      PHYSICAL EXAM:  General:   HEENT:   Lymph Nodes:  Neck:   Respiratory:   Cardiovascular:   Abdomen:   Extremities:   Skin:   Neurological:  Psychiatry:    LINES:     HOSPITAL MEDICATIONS:  MEDICATIONS  (STANDING):  ALBUTerol/ipratropium for Nebulization 3 milliLiter(s) Nebulizer every 6 hours  aspirin enteric coated 81 milliGRAM(s) Oral daily  atorvastatin 20 milliGRAM(s) Oral at bedtime  buDESOnide    Inhalation Suspension 0.5 milliGRAM(s) Inhalation two times a day  chlorhexidine 4% Liquid 1 Application(s) Topical <User Schedule>  clopidogrel Tablet 75 milliGRAM(s) Oral daily  dextrose 5%. 1000 milliLiter(s) (50 mL/Hr) IV Continuous <Continuous>  dextrose 50% Injectable 12.5 Gram(s) IV Push once  dextrose 50% Injectable 25 Gram(s) IV Push once  dextrose 50% Injectable 25 Gram(s) IV Push once  docusate sodium 100 milliGRAM(s) Oral three times a day  heparin  Injectable 5000 Unit(s) SubCutaneous every 12 hours  hydrALAZINE 25 milliGRAM(s) Oral three times a day  insulin glargine Injectable (LANTUS) 7 Unit(s) SubCutaneous once  insulin lispro (HumaLOG) corrective regimen sliding scale   SubCutaneous every 6 hours  isosorbide   dinitrate Tablet (ISORDIL) 10 milliGRAM(s) Oral three times a day  metoprolol succinate ER 50 milliGRAM(s) Oral daily  pantoprazole    Tablet 40 milliGRAM(s) Oral before breakfast    MEDICATIONS  (PRN):  acetaminophen   Tablet .. 650 milliGRAM(s) Oral every 6 hours PRN Temp greater or equal to 38.5C (101.3F), Mild Pain (1 - 3)  dextrose 40% Gel 15 Gram(s) Oral once PRN Blood Glucose LESS THAN 70 milliGRAM(s)/deciliter  glucagon  Injectable 1 milliGRAM(s) IntraMuscular once PRN Glucose LESS THAN 70 milligrams/deciliter  oxyCODONE    5 mG/acetaminophen 325 mG 1 Tablet(s) Oral every 6 hours PRN Moderate Pain (4 - 6)  senna 2 Tablet(s) Oral at bedtime PRN Constipation      LABS:                        9.1    12.63 )-----------( 267      ( 15 Sep 2019 09:35 )             30.1     Hgb Trend: 9.1<--, 10.4<--  09-15    131<L>  |  84<L>  |  35<H>  ----------------------------<  456<HH>  6.2<HH>   |  14<L>  |  5.06<H>    Ca    9.9      15 Sep 2019 11:05    TPro  7.2  /  Alb  4.1  /  TBili  0.7  /  DBili  x   /  AST  61<H>  /  ALT  44  /  AlkPhos  125<H>  09-15    Creatinine Trend: 5.06<--, 4.78<--, 4.17<--  PT/INR - ( 14 Sep 2019 23:50 )   PT: 13.3 sec;   INR: 1.15 ratio         PTT - ( 14 Sep 2019 23:50 )  PTT:28.0 sec      Venous Blood Gas:  09-14 @ 23:29  7.41/49/26/31/39  VBG Lactate: 1.7      MICROBIOLOGY:     RADIOLOGY:  [ ] Reviewed and interpreted by me    EKG:        ASSESSMENT AND PLAN    65 y/o F w/ PMHx ESRD (HD M/T/T/Sat), IDDM, CHF, CAD, ischemic cardiomyopathy presents to the ED with upper respiratory illness and DKA.     #Neuro  -A&Ox4    #Endo  - After dialysis (where she received insulin), her blood sugar was 172.   - BMP q4h  - AG 33  - Started on insulin 2units/hr with D5 at 50cc/hr.   - f/u acetone, beta hydroxy-butyrate    #Resp  -duonebs and pulmicort     #CV  - hx of ischemic CMP  - c/w plavix 75mg oral daily  - lipitor 20 oral daily  - hydralazine 25mg TID, isosorbide dinitrate 10mg TID, metoprolol 50 oral daily    #GI  - protonix 40mg before breakfast  - keeping NPO right now for DKA    #ID  - s/p 1 dose of vanc and zosyn  - RVP positive for rhinovirus  - no need for abx right now  - f/u UA    #Renal  - s/p hemodialysis to remove 1L of fluid  - will f/u renal recs    #Heme  - hemoglobin stable, will c/w to monitor    #DVT PPx  - heparin subq 500 BID. CHIEF COMPLAINT: DKA    HPI:  63 y/o F w/ PMHx ESRD (HD M/T/T/Sat), IDDM, CHF, CAD, ischemic cardiomyopathy presents to the ED BIBA for fever and AMS. She also reports a productive cough w/ yellow sputum for "days". She otherwise had her full course of dialysis today and reports chills at that time.    In the ED, she had a fever of 101.6, WBC was 11.9, Hb 10.4 and repeat was WBC 12.63 with Hb 9.1. Troponin [id number]. Blood sugars were >300 with beta hydroxy-butyrate of 5.9. Subsequently, her blood sugars decreased and BMP showed Na 131, K 6.2 (slightly hemolyzed) Cl 84, CO2 14, BUN 35 and Cr 5.06 with anion gap of 33 (which has been opening more). Patient missed lantus dose yesterday. Patient went to get her routine hemodialysis as well before coming to the MICU. CXR at the time showed Left basilar linear opacity is similar to the prior study. Rapid RVP result was positive for entero/rhinovirus. ABG showed pH 7.41, pCO2 49, pO2 26, HCO3 31. BNP is 35,000. CK 24 and CKMB is 5.9. in the ED, he received Vanc/Zosyn.    PAST MEDICAL & SURGICAL HISTORY:  COPD (chronic obstructive pulmonary disease)  Localized enlarged lymph nodes  CHF (congestive heart failure): EF 40-45%  Subclavian vein stenosis, left: s/p stent  DKA, type 1: 1/2015  ACS (acute coronary syndrome): 1/2015 - cath revealed 100% ostial stenosis not amenable to PCI - medical management  TIA (transient ischemic attack): x 2 - 8-9 years ago prior to ASD/VSD repair  CAD (coronary artery disease): s/p stents  Gout: past  CVA (cerebral infarction): with no residual, 8 yrs ago, prior to heart surgery - ST memory loss  Peripheral vascular disease: occluded left fem-pop bypass 5/2015  Diabetes mellitus type 1: Insulin Dependent -  ESRD (end stage renal disease): dialysis  M, tue, thursday, saturday  Hyperlipidemia  Status post device closure of ASD: &quot;clamshell&quot;  History of cardiac catheterization: 1/2015 - no intervention  S/P femoral-popliteal bypass surgery: L and R in 2013 with graft; 5/2015 CFA angioplasty left and ileofemoral endarterectomywith vein patch angioplasty of left fem-pop bypass graft  Multiple vascular surgery both leg, left fempop bypass revision 11/2015  AV (arteriovenous fistula): Left AV graft; revision with stent placement 2-3 years ago  S/P cholecystectomy      FAMILY HISTORY:  Family history of smoking  Family history of hypertension  Family history of cancer (Sibling)      SOCIAL HISTORY:  Smoking: [ ] Never Smoked [ ] Former Smoker (__ packs x ___ years) [ ] Current Smoker  (__ packs x ___ years)  Substance Use: [ ] Never Used [ ] Used ____  EtOH Use:  Marital Status: [ ] Single [ ]  [ ]  [ ]   Sexual History:   Occupation:  Recent Travel:  Country of Birth:  Advance Directives:    Allergies    No Known Allergies    Intolerances        HOME MEDICATIONS:    REVIEW OF SYSTEMS:  Constitutional: [ ] fevers [ ] chills [ ] weight loss [ ] weight gain  HEENT: [ ] dry eyes [ ] eye irritation [ ] postnasal drip [ ] nasal congestion  CV: [ ] chest pain [ ] orthopnea [ ] palpitations [ ] murmur  Resp: [ ] cough [ ] shortness of breath [ ] dyspnea [ ] wheezing [ ] sputum [ ] hemoptysis  GI: [ ] nausea [ ] vomiting [ ] diarrhea [ ] constipation [ ] abd pain [ ] dysphagia   : [ ] dysuria [ ] nocturia [ ] hematuria [ ] increased urinary frequency  Musculoskeletal: [ ] back pain [ ] myalgias [ ] arthralgias [ ] fracture  Skin: [ ] rash [ ] itch  Neurological: [ ] headache [ ] dizziness [ ] syncope [ ] weakness [ ] numbness  Psychiatric: [ ] anxiety [ ] depression  Endocrine: [ ] diabetes [ ] thyroid problem  Hematologic/Lymphatic: [ ] anemia [ ] bleeding problem  Allergic/Immunologic: [ ] itchy eyes [ ] nasal discharge [ ] hives [ ] angioedema  [ ] All other systems negative  [ ] Unable to assess ROS because ________    OBJECTIVE:  ICU Vital Signs Last 24 Hrs  T(C): 36.6 (15 Sep 2019 16:30), Max: 38.7 (14 Sep 2019 23:20)  T(F): 97.8 (15 Sep 2019 16:30), Max: 101.6 (14 Sep 2019 23:20)  HR: 77 (15 Sep 2019 16:30) (77 - 110)  BP: 140/63 (15 Sep 2019 16:30) (118/55 - 141/80)  BP(mean): 91 (15 Sep 2019 16:30) (73 - 98)  ABP: --  ABP(mean): --  RR: 27 (15 Sep 2019 16:30) (15 - 35)  SpO2: 94% (15 Sep 2019 16:30) (87% - 100%)        CAPILLARY BLOOD GLUCOSE      POCT Blood Glucose.: 174 mg/dL (15 Sep 2019 16:12)      PHYSICAL EXAM:  General:   HEENT:   Lymph Nodes:  Neck:   Respiratory:   Cardiovascular:   Abdomen:   Extremities:   Skin:   Neurological:  Psychiatry:    LINES:     HOSPITAL MEDICATIONS:  MEDICATIONS  (STANDING):  ALBUTerol/ipratropium for Nebulization 3 milliLiter(s) Nebulizer every 6 hours  aspirin enteric coated 81 milliGRAM(s) Oral daily  atorvastatin 20 milliGRAM(s) Oral at bedtime  buDESOnide    Inhalation Suspension 0.5 milliGRAM(s) Inhalation two times a day  chlorhexidine 4% Liquid 1 Application(s) Topical <User Schedule>  clopidogrel Tablet 75 milliGRAM(s) Oral daily  dextrose 5%. 1000 milliLiter(s) (50 mL/Hr) IV Continuous <Continuous>  dextrose 50% Injectable 12.5 Gram(s) IV Push once  dextrose 50% Injectable 25 Gram(s) IV Push once  dextrose 50% Injectable 25 Gram(s) IV Push once  docusate sodium 100 milliGRAM(s) Oral three times a day  heparin  Injectable 5000 Unit(s) SubCutaneous every 12 hours  hydrALAZINE 25 milliGRAM(s) Oral three times a day  insulin glargine Injectable (LANTUS) 7 Unit(s) SubCutaneous once  insulin lispro (HumaLOG) corrective regimen sliding scale   SubCutaneous every 6 hours  isosorbide   dinitrate Tablet (ISORDIL) 10 milliGRAM(s) Oral three times a day  metoprolol succinate ER 50 milliGRAM(s) Oral daily  pantoprazole    Tablet 40 milliGRAM(s) Oral before breakfast    MEDICATIONS  (PRN):  acetaminophen   Tablet .. 650 milliGRAM(s) Oral every 6 hours PRN Temp greater or equal to 38.5C (101.3F), Mild Pain (1 - 3)  dextrose 40% Gel 15 Gram(s) Oral once PRN Blood Glucose LESS THAN 70 milliGRAM(s)/deciliter  glucagon  Injectable 1 milliGRAM(s) IntraMuscular once PRN Glucose LESS THAN 70 milligrams/deciliter  oxyCODONE    5 mG/acetaminophen 325 mG 1 Tablet(s) Oral every 6 hours PRN Moderate Pain (4 - 6)  senna 2 Tablet(s) Oral at bedtime PRN Constipation      LABS:                        9.1    12.63 )-----------( 267      ( 15 Sep 2019 09:35 )             30.1     Hgb Trend: 9.1<--, 10.4<--  09-15    131<L>  |  84<L>  |  35<H>  ----------------------------<  456<HH>  6.2<HH>   |  14<L>  |  5.06<H>    Ca    9.9      15 Sep 2019 11:05    TPro  7.2  /  Alb  4.1  /  TBili  0.7  /  DBili  x   /  AST  61<H>  /  ALT  44  /  AlkPhos  125<H>  09-15    Creatinine Trend: 5.06<--, 4.78<--, 4.17<--  PT/INR - ( 14 Sep 2019 23:50 )   PT: 13.3 sec;   INR: 1.15 ratio         PTT - ( 14 Sep 2019 23:50 )  PTT:28.0 sec      Venous Blood Gas:  09-14 @ 23:29  7.41/49/26/31/39  VBG Lactate: 1.7      MICROBIOLOGY:     RADIOLOGY:  [ ] Reviewed and interpreted by me    EKG:        ASSESSMENT AND PLAN    63 y/o F w/ PMHx ESRD (HD M/T/T/Sat), IDDM, CHF, CAD, ischemic cardiomyopathy presents to the ED with upper respiratory illness and DKA.     #Neuro  -A&Ox4    #Endo  - After dialysis (where she received insulin), her blood sugar was 172.   - BMP q4h  - AG 33  - Started on insulin 2units/hr with D5 at 50cc/hr.   - f/u acetone, beta hydroxy-butyrate    #Resp  -duonebs and pulmicort     #CV  - hx of ischemic CMP  - c/w plavix 75mg oral daily  - lipitor 20 oral daily  - hydralazine 25mg TID, isosorbide dinitrate 10mg TID, metoprolol 50 oral daily    #GI  - protonix 40mg before breakfast  - keeping NPO right now for DKA    #ID  - s/p 1 dose of vanc and zosyn  - RVP positive for rhinovirus  - no need for abx right now  - f/u UA and f/u Blood cultures    #Renal  - s/p hemodialysis to remove 1L of fluid  - will f/u renal recs    #Heme  - hemoglobin stable, will c/w to monitor    #DVT PPx  - heparin subq 500 BID. CHIEF COMPLAINT: DKA    HPI:  65 y/o F w/ PMHx ESRD (HD M/T/T/Sat), IDDM, CHF, CAD, ischemic cardiomyopathy presents to the ED BIBA for fever and AMS. She also reports a productive cough w/ yellow sputum for "days". She otherwise had her full course of dialysis today and reports chills at that time.    In the ED, she had a fever of 101.6, WBC was 11.9, Hb 10.4 and repeat was WBC 12.63 with Hb 9.1. Troponin [id number]. Blood sugars were >300 with beta hydroxy-butyrate of 5.9. Subsequently, her blood sugars decreased and BMP showed Na 131, K 6.2 (slightly hemolyzed) Cl 84, CO2 14, BUN 35 and Cr 5.06 with anion gap of 33 (which has been opening more). Patient missed lantus dose yesterday. Patient went to get her routine hemodialysis as well before coming to the MICU. CXR at the time showed Left basilar linear opacity is similar to the prior study. Rapid RVP result was positive for entero/rhinovirus. ABG showed pH 7.41, pCO2 49, pO2 26, HCO3 31. BNP is 35,000. CK 24 and CKMB is 5.9. in the ED, he received Vanc/Zosyn.    PAST MEDICAL & SURGICAL HISTORY:  COPD (chronic obstructive pulmonary disease)  Localized enlarged lymph nodes  CHF (congestive heart failure): EF 40-45%  Subclavian vein stenosis, left: s/p stent  DKA, type 1: 1/2015  ACS (acute coronary syndrome): 1/2015 - cath revealed 100% ostial stenosis not amenable to PCI - medical management  TIA (transient ischemic attack): x 2 - 8-9 years ago prior to ASD/VSD repair  CAD (coronary artery disease): s/p stents  Gout: past  CVA (cerebral infarction): with no residual, 8 yrs ago, prior to heart surgery - ST memory loss  Peripheral vascular disease: occluded left fem-pop bypass 5/2015  Diabetes mellitus type 1: Insulin Dependent -  ESRD (end stage renal disease): dialysis  M, tue, thursday, saturday  Hyperlipidemia  Status post device closure of ASD: &quot;clamshell&quot;  History of cardiac catheterization: 1/2015 - no intervention  S/P femoral-popliteal bypass surgery: L and R in 2013 with graft; 5/2015 CFA angioplasty left and ileofemoral endarterectomywith vein patch angioplasty of left fem-pop bypass graft  Multiple vascular surgery both leg, left fempop bypass revision 11/2015  AV (arteriovenous fistula): Left AV graft; revision with stent placement 2-3 years ago  S/P cholecystectomy      FAMILY HISTORY:  Family history of smoking  Family history of hypertension  Family history of cancer (Sibling)      SOCIAL HISTORY:  Smoking: [ ] Never Smoked [ ] Former Smoker (__ packs x ___ years) [ ] Current Smoker  (__ packs x ___ years)  Substance Use: [ ] Never Used [ ] Used ____  EtOH Use:  Marital Status: [ ] Single [ ]  [ ]  [ ]   Sexual History:   Occupation:  Recent Travel:  Country of Birth:  Advance Directives:    Allergies    No Known Allergies    Intolerances        HOME MEDICATIONS:    REVIEW OF SYSTEMS:  Constitutional: [ ] fevers [ ] chills [ ] weight loss [ ] weight gain  HEENT: [ ] dry eyes [ ] eye irritation [ ] postnasal drip [ ] nasal congestion  CV: [ ] chest pain [ ] orthopnea [ ] palpitations [ ] murmur  Resp: [ ] cough [ ] shortness of breath [ ] dyspnea [ ] wheezing [ ] sputum [ ] hemoptysis  GI: [ ] nausea [ ] vomiting [ ] diarrhea [ ] constipation [ ] abd pain [ ] dysphagia   : [ ] dysuria [ ] nocturia [ ] hematuria [ ] increased urinary frequency  Musculoskeletal: [ ] back pain [ ] myalgias [ ] arthralgias [ ] fracture  Skin: [ ] rash [ ] itch  Neurological: [ ] headache [ ] dizziness [ ] syncope [ ] weakness [ ] numbness  Psychiatric: [ ] anxiety [ ] depression  Endocrine: [ ] diabetes [ ] thyroid problem  Hematologic/Lymphatic: [ ] anemia [ ] bleeding problem  Allergic/Immunologic: [ ] itchy eyes [ ] nasal discharge [ ] hives [ ] angioedema  [ ] All other systems negative  [ ] Unable to assess ROS because ________    OBJECTIVE:  ICU Vital Signs Last 24 Hrs  T(C): 36.6 (15 Sep 2019 16:30), Max: 38.7 (14 Sep 2019 23:20)  T(F): 97.8 (15 Sep 2019 16:30), Max: 101.6 (14 Sep 2019 23:20)  HR: 77 (15 Sep 2019 16:30) (77 - 110)  BP: 140/63 (15 Sep 2019 16:30) (118/55 - 141/80)  BP(mean): 91 (15 Sep 2019 16:30) (73 - 98)  ABP: --  ABP(mean): --  RR: 27 (15 Sep 2019 16:30) (15 - 35)  SpO2: 94% (15 Sep 2019 16:30) (87% - 100%)        CAPILLARY BLOOD GLUCOSE      POCT Blood Glucose.: 174 mg/dL (15 Sep 2019 16:12)      PHYSICAL EXAM:  General:   HEENT:   Lymph Nodes:  Neck:   Respiratory:   Cardiovascular:   Abdomen:   Extremities:   Skin:   Neurological:  Psychiatry:    LINES:     HOSPITAL MEDICATIONS:  MEDICATIONS  (STANDING):  ALBUTerol/ipratropium for Nebulization 3 milliLiter(s) Nebulizer every 6 hours  aspirin enteric coated 81 milliGRAM(s) Oral daily  atorvastatin 20 milliGRAM(s) Oral at bedtime  buDESOnide    Inhalation Suspension 0.5 milliGRAM(s) Inhalation two times a day  chlorhexidine 4% Liquid 1 Application(s) Topical <User Schedule>  clopidogrel Tablet 75 milliGRAM(s) Oral daily  dextrose 5%. 1000 milliLiter(s) (50 mL/Hr) IV Continuous <Continuous>  dextrose 50% Injectable 12.5 Gram(s) IV Push once  dextrose 50% Injectable 25 Gram(s) IV Push once  dextrose 50% Injectable 25 Gram(s) IV Push once  docusate sodium 100 milliGRAM(s) Oral three times a day  heparin  Injectable 5000 Unit(s) SubCutaneous every 12 hours  hydrALAZINE 25 milliGRAM(s) Oral three times a day  insulin glargine Injectable (LANTUS) 7 Unit(s) SubCutaneous once  insulin lispro (HumaLOG) corrective regimen sliding scale   SubCutaneous every 6 hours  isosorbide   dinitrate Tablet (ISORDIL) 10 milliGRAM(s) Oral three times a day  metoprolol succinate ER 50 milliGRAM(s) Oral daily  pantoprazole    Tablet 40 milliGRAM(s) Oral before breakfast    MEDICATIONS  (PRN):  acetaminophen   Tablet .. 650 milliGRAM(s) Oral every 6 hours PRN Temp greater or equal to 38.5C (101.3F), Mild Pain (1 - 3)  dextrose 40% Gel 15 Gram(s) Oral once PRN Blood Glucose LESS THAN 70 milliGRAM(s)/deciliter  glucagon  Injectable 1 milliGRAM(s) IntraMuscular once PRN Glucose LESS THAN 70 milligrams/deciliter  oxyCODONE    5 mG/acetaminophen 325 mG 1 Tablet(s) Oral every 6 hours PRN Moderate Pain (4 - 6)  senna 2 Tablet(s) Oral at bedtime PRN Constipation      LABS:                        9.1    12.63 )-----------( 267      ( 15 Sep 2019 09:35 )             30.1     Hgb Trend: 9.1<--, 10.4<--  09-15    131<L>  |  84<L>  |  35<H>  ----------------------------<  456<HH>  6.2<HH>   |  14<L>  |  5.06<H>    Ca    9.9      15 Sep 2019 11:05    TPro  7.2  /  Alb  4.1  /  TBili  0.7  /  DBili  x   /  AST  61<H>  /  ALT  44  /  AlkPhos  125<H>  09-15    Creatinine Trend: 5.06<--, 4.78<--, 4.17<--  PT/INR - ( 14 Sep 2019 23:50 )   PT: 13.3 sec;   INR: 1.15 ratio         PTT - ( 14 Sep 2019 23:50 )  PTT:28.0 sec      Venous Blood Gas:  09-14 @ 23:29  7.41/49/26/31/39  VBG Lactate: 1.7      MICROBIOLOGY:     RADIOLOGY:  [ ] Reviewed and interpreted by me    EKG:        ASSESSMENT AND PLAN    65 y/o F w/ PMHx ESRD (HD M/T/T/Sat), IDDM, CHF, CAD, ischemic cardiomyopathy presents to the ED with upper respiratory illness and DKA.     #Neuro  -A&Ox4    #Endo  - After dialysis (where she received insulin), her blood sugar was 172.   - BMP q4h  - AG 33  - Started on insulin 1units/hr with D10 at 50cc/hr.   - f/u acetone, beta hydroxy-butyrate    #Resp  -duonebs and pulmicort     #CV  - hx of ischemic CMP  - c/w plavix 75mg oral daily  - lipitor 20 oral daily  - hydralazine 25mg TID, isosorbide dinitrate 10mg TID, metoprolol 50 oral daily    #GI  - protonix 40mg before breakfast  - keeping NPO right now for DKA    #ID  - s/p 1 dose of vanc and zosyn  - RVP positive for rhinovirus  - no need for abx right now  - f/u UA and f/u Blood cultures    #Renal  - s/p hemodialysis to remove 1L of fluid  - will f/u renal recs    #Heme  - hemoglobin stable, will c/w to monitor    #DVT PPx  - heparin subq 500 BID. CHIEF COMPLAINT: DKA    HPI:  63 y/o F w/ PMHx ESRD (HD M/T/T/Sat), IDDM, CHF, CAD, ischemic cardiomyopathy presents to the ED BIBA for fever and AMS. She also reports a productive cough w/ yellow sputum for "days". She otherwise had her full course of dialysis today and reports chills at that time.    In the ED, she had a fever of 101.6, WBC was 11.9, Hb 10.4 and repeat was WBC 12.63 with Hb 9.1. Troponin [id number]. Blood sugars were >300 with beta hydroxy-butyrate of 5.9. Subsequently, her blood sugars decreased and BMP showed Na 131, K 6.2 (slightly hemolyzed) Cl 84, CO2 14, BUN 35 and Cr 5.06 with anion gap of 33 (which has been opening more). Patient missed lantus dose yesterday. Patient went to get her routine hemodialysis as well before coming to the MICU. CXR at the time showed Left basilar linear opacity is similar to the prior study. Rapid RVP result was positive for entero/rhinovirus. ABG showed pH 7.41, pCO2 49, pO2 26, HCO3 31. BNP is 35,000. CK 24 and CKMB is 5.9. in the ED, he received Vanc/Zosyn.    PAST MEDICAL & SURGICAL HISTORY:  COPD (chronic obstructive pulmonary disease)  Localized enlarged lymph nodes  CHF (congestive heart failure): EF 40-45%  Subclavian vein stenosis, left: s/p stent  DKA, type 1: 1/2015  ACS (acute coronary syndrome): 1/2015 - cath revealed 100% ostial stenosis not amenable to PCI - medical management  TIA (transient ischemic attack): x 2 - 8-9 years ago prior to ASD/VSD repair  CAD (coronary artery disease): s/p stents  Gout: past  CVA (cerebral infarction): with no residual, 8 yrs ago, prior to heart surgery - ST memory loss  Peripheral vascular disease: occluded left fem-pop bypass 5/2015  Diabetes mellitus type 1: Insulin Dependent -  ESRD (end stage renal disease): dialysis  M, tue, thursday, saturday  Hyperlipidemia  Status post device closure of ASD: &quot;clamshell&quot;  History of cardiac catheterization: 1/2015 - no intervention  S/P femoral-popliteal bypass surgery: L and R in 2013 with graft; 5/2015 CFA angioplasty left and ileofemoral endarterectomywith vein patch angioplasty of left fem-pop bypass graft  Multiple vascular surgery both leg, left fempop bypass revision 11/2015  AV (arteriovenous fistula): Left AV graft; revision with stent placement 2-3 years ago  S/P cholecystectomy      FAMILY HISTORY:  Family history of smoking  Family history of hypertension  Family history of cancer (Sibling)      SOCIAL HISTORY:  Smoking: [ ] Never Smoked [ ] Former Smoker (__ packs x ___ years) [ ] Current Smoker  (__ packs x ___ years)  Substance Use: [ ] Never Used [ ] Used ____  EtOH Use:  Marital Status: [ ] Single [ ]  [ ]  [ ]   Sexual History:   Occupation:  Recent Travel:  Country of Birth:  Advance Directives:    Allergies    No Known Allergies    Intolerances        HOME MEDICATIONS:    REVIEW OF SYSTEMS:  Constitutional: [ ] fevers [ ] chills [ ] weight loss [ ] weight gain  HEENT: [ ] dry eyes [ ] eye irritation [ ] postnasal drip [ ] nasal congestion  CV: [ ] chest pain [ ] orthopnea [ ] palpitations [ ] murmur  Resp: [ ] cough [ ] shortness of breath [ ] dyspnea [ ] wheezing [ ] sputum [ ] hemoptysis  GI: [ ] nausea [ ] vomiting [ ] diarrhea [ ] constipation [ ] abd pain [ ] dysphagia   : [ ] dysuria [ ] nocturia [ ] hematuria [ ] increased urinary frequency  Musculoskeletal: [ ] back pain [ ] myalgias [ ] arthralgias [ ] fracture  Skin: [ ] rash [ ] itch  Neurological: [ ] headache [ ] dizziness [ ] syncope [ ] weakness [ ] numbness  Psychiatric: [ ] anxiety [ ] depression  Endocrine: [ ] diabetes [ ] thyroid problem  Hematologic/Lymphatic: [ ] anemia [ ] bleeding problem  Allergic/Immunologic: [ ] itchy eyes [ ] nasal discharge [ ] hives [ ] angioedema  [ ] All other systems negative  [ ] Unable to assess ROS because ________    OBJECTIVE:  ICU Vital Signs Last 24 Hrs  T(C): 36.6 (15 Sep 2019 16:30), Max: 38.7 (14 Sep 2019 23:20)  T(F): 97.8 (15 Sep 2019 16:30), Max: 101.6 (14 Sep 2019 23:20)  HR: 77 (15 Sep 2019 16:30) (77 - 110)  BP: 140/63 (15 Sep 2019 16:30) (118/55 - 141/80)  BP(mean): 91 (15 Sep 2019 16:30) (73 - 98)  ABP: --  ABP(mean): --  RR: 27 (15 Sep 2019 16:30) (15 - 35)  SpO2: 94% (15 Sep 2019 16:30) (87% - 100%)        CAPILLARY BLOOD GLUCOSE      POCT Blood Glucose.: 174 mg/dL (15 Sep 2019 16:12)      PHYSICAL EXAM:  General:   HEENT:   Lymph Nodes:  Neck:   Respiratory:   Cardiovascular:   Abdomen:   Extremities:   Skin:   Neurological:  Psychiatry:    LINES:     HOSPITAL MEDICATIONS:  MEDICATIONS  (STANDING):  ALBUTerol/ipratropium for Nebulization 3 milliLiter(s) Nebulizer every 6 hours  aspirin enteric coated 81 milliGRAM(s) Oral daily  atorvastatin 20 milliGRAM(s) Oral at bedtime  buDESOnide    Inhalation Suspension 0.5 milliGRAM(s) Inhalation two times a day  chlorhexidine 4% Liquid 1 Application(s) Topical <User Schedule>  clopidogrel Tablet 75 milliGRAM(s) Oral daily  dextrose 5%. 1000 milliLiter(s) (50 mL/Hr) IV Continuous <Continuous>  dextrose 50% Injectable 12.5 Gram(s) IV Push once  dextrose 50% Injectable 25 Gram(s) IV Push once  dextrose 50% Injectable 25 Gram(s) IV Push once  docusate sodium 100 milliGRAM(s) Oral three times a day  heparin  Injectable 5000 Unit(s) SubCutaneous every 12 hours  hydrALAZINE 25 milliGRAM(s) Oral three times a day  insulin glargine Injectable (LANTUS) 7 Unit(s) SubCutaneous once  insulin lispro (HumaLOG) corrective regimen sliding scale   SubCutaneous every 6 hours  isosorbide   dinitrate Tablet (ISORDIL) 10 milliGRAM(s) Oral three times a day  metoprolol succinate ER 50 milliGRAM(s) Oral daily  pantoprazole    Tablet 40 milliGRAM(s) Oral before breakfast    MEDICATIONS  (PRN):  acetaminophen   Tablet .. 650 milliGRAM(s) Oral every 6 hours PRN Temp greater or equal to 38.5C (101.3F), Mild Pain (1 - 3)  dextrose 40% Gel 15 Gram(s) Oral once PRN Blood Glucose LESS THAN 70 milliGRAM(s)/deciliter  glucagon  Injectable 1 milliGRAM(s) IntraMuscular once PRN Glucose LESS THAN 70 milligrams/deciliter  oxyCODONE    5 mG/acetaminophen 325 mG 1 Tablet(s) Oral every 6 hours PRN Moderate Pain (4 - 6)  senna 2 Tablet(s) Oral at bedtime PRN Constipation      LABS:                        9.1    12.63 )-----------( 267      ( 15 Sep 2019 09:35 )             30.1     Hgb Trend: 9.1<--, 10.4<--  09-15    131<L>  |  84<L>  |  35<H>  ----------------------------<  456<HH>  6.2<HH>   |  14<L>  |  5.06<H>    Ca    9.9      15 Sep 2019 11:05    TPro  7.2  /  Alb  4.1  /  TBili  0.7  /  DBili  x   /  AST  61<H>  /  ALT  44  /  AlkPhos  125<H>  09-15    Creatinine Trend: 5.06<--, 4.78<--, 4.17<--  PT/INR - ( 14 Sep 2019 23:50 )   PT: 13.3 sec;   INR: 1.15 ratio         PTT - ( 14 Sep 2019 23:50 )  PTT:28.0 sec      Venous Blood Gas:  09-14 @ 23:29  7.41/49/26/31/39  VBG Lactate: 1.7      MICROBIOLOGY:     RADIOLOGY:  [ ] Reviewed and interpreted by me    EKG:        ASSESSMENT AND PLAN    63 y/o F w/ PMHx ESRD (HD M/T/T/Sat), IDDM, CHF, CAD, ischemic cardiomyopathy presents to the ED with upper respiratory illness and DKA.     #Neuro  -A&Ox4    #Endo  - After dialysis (where she received insulin), her blood sugar was 172.   - BMP q4h  - AG 33  - Started on insulin 1units/hr with D10 at 50cc/hr.   - f/u acetone, beta hydroxy-butyrate    #Resp  -duonebs and pulmicort     #CV  - hx of ischemic CMP  - c/w plavix 75mg oral daily  - lipitor 20 oral daily  - hydralazine 25mg TID, isosorbide dinitrate 10mg TID, metoprolol 50 oral daily    #GI  - protonix 40mg before breakfast  - keeping NPO right now for DKA    #ID  - s/p 1 dose of vanc and zosyn  - RVP positive for rhinovirus  - no need for abx right now  - f/u UA and f/u Blood cultures    #Renal  - s/p hemodialysis to remove 1L of fluid  - will f/u renal recs    #Heme  - hemoglobin stable, will c/w to monitor    #DVT PPx  - heparin subq 500 BID.      CCM Attending. I have examined pt with residents and agree with above exam and plan.  63 y/o female well known to MICU again with DKA. Pt has not had long acting insulin for >36 hours. Glucose > 400 and AG 33.  P t has just received dialysis . Will start insulin drip with small amount of fluids. pt has just had 1 liter fluid removed. Pt with enterovirus positive. With URI. No evidence of bacterial infection. Pt with ESRD dependent on dialysis.    I have spent 35 min cc time excluding procedures.    Noman Perla MD CHIEF COMPLAINT: DKA    HPI:  65 y/o F w/ PMHx ESRD (HD M/T/T/Sat), IDDM, CHF, CAD, ischemic cardiomyopathy presents to the ED BIBA for fever and AMS. She also reports a productive cough w/ yellow sputum for "days". She otherwise had her full course of dialysis today and reports chills at that time.    In the ED, she had a fever of 101.6, WBC was 11.9, Hb 10.4 and repeat was WBC 12.63 with Hb 9.1. Troponin [id number]. Blood sugars were >300 with beta hydroxy-butyrate of 5.9. Subsequently, her blood sugars decreased and BMP showed Na 131, K 6.2 (slightly hemolyzed) Cl 84, CO2 14, BUN 35 and Cr 5.06 with anion gap of 33 (which has been opening more). Patient missed lantus dose yesterday. Patient went to get her routine hemodialysis as well before coming to the MICU. CXR at the time showed Left basilar linear opacity is similar to the prior study. Rapid RVP result was positive for entero/rhinovirus. ABG showed pH 7.41, pCO2 49, pO2 26, HCO3 31. BNP is 35,000. CK 24 and CKMB is 5.9. in the ED, he received Vanc/Zosyn.    PAST MEDICAL & SURGICAL HISTORY:  COPD (chronic obstructive pulmonary disease)  Localized enlarged lymph nodes  CHF (congestive heart failure): EF 40-45%  Subclavian vein stenosis, left: s/p stent  DKA, type 1: 1/2015  ACS (acute coronary syndrome): 1/2015 - cath revealed 100% ostial stenosis not amenable to PCI - medical management  TIA (transient ischemic attack): x 2 - 8-9 years ago prior to ASD/VSD repair  CAD (coronary artery disease): s/p stents  Gout: past  CVA (cerebral infarction): with no residual, 8 yrs ago, prior to heart surgery - ST memory loss  Peripheral vascular disease: occluded left fem-pop bypass 5/2015  Diabetes mellitus type 1: Insulin Dependent -  ESRD (end stage renal disease): dialysis  M, tue, thursday, saturday  Hyperlipidemia  Status post device closure of ASD: &quot;clamshell&quot;  History of cardiac catheterization: 1/2015 - no intervention  S/P femoral-popliteal bypass surgery: L and R in 2013 with graft; 5/2015 CFA angioplasty left and ileofemoral endarterectomywith vein patch angioplasty of left fem-pop bypass graft  Multiple vascular surgery both leg, left fempop bypass revision 11/2015  AV (arteriovenous fistula): Left AV graft; revision with stent placement 2-3 years ago  S/P cholecystectomy      FAMILY HISTORY:  Family history of smoking  Family history of hypertension  Family history of cancer (Sibling)      SOCIAL HISTORY:  Smoking: [ ] Never Smoked [ ] Former Smoker (__ packs x ___ years) [ ] Current Smoker  (__ packs x ___ years)  Substance Use: [ ] Never Used [ ] Used ____  EtOH Use:  Marital Status: [ ] Single [ ]  [ ]  [ ]   Sexual History:   Occupation:  Recent Travel:  Country of Birth:  Advance Directives:    Allergies    No Known Allergies    Intolerances        HOME MEDICATIONS:    REVIEW OF SYSTEMS:  Constitutional: [ ] fevers [ ] chills [ ] weight loss [ ] weight gain  HEENT: [ ] dry eyes [ ] eye irritation [ ] postnasal drip [ ] nasal congestion  CV: [ ] chest pain [ ] orthopnea [ ] palpitations [ ] murmur  Resp: [ ] cough [ ] shortness of breath [ ] dyspnea [ ] wheezing [ ] sputum [ ] hemoptysis  GI: [ ] nausea [ ] vomiting [ ] diarrhea [ ] constipation [ ] abd pain [ ] dysphagia   : [ ] dysuria [ ] nocturia [ ] hematuria [ ] increased urinary frequency  Musculoskeletal: [ ] back pain [ ] myalgias [ ] arthralgias [ ] fracture  Skin: [ ] rash [ ] itch  Neurological: [ ] headache [ ] dizziness [ ] syncope [ ] weakness [ ] numbness  Psychiatric: [ ] anxiety [ ] depression  Endocrine: [ ] diabetes [ ] thyroid problem  Hematologic/Lymphatic: [ ] anemia [ ] bleeding problem  Allergic/Immunologic: [ ] itchy eyes [ ] nasal discharge [ ] hives [ ] angioedema  [ ] All other systems negative  [ ] Unable to assess ROS because ________    OBJECTIVE:  ICU Vital Signs Last 24 Hrs  T(C): 36.6 (15 Sep 2019 16:30), Max: 38.7 (14 Sep 2019 23:20)  T(F): 97.8 (15 Sep 2019 16:30), Max: 101.6 (14 Sep 2019 23:20)  HR: 77 (15 Sep 2019 16:30) (77 - 110)  BP: 140/63 (15 Sep 2019 16:30) (118/55 - 141/80)  BP(mean): 91 (15 Sep 2019 16:30) (73 - 98)  ABP: --  ABP(mean): --  RR: 27 (15 Sep 2019 16:30) (15 - 35)  SpO2: 94% (15 Sep 2019 16:30) (87% - 100%)        CAPILLARY BLOOD GLUCOSE      POCT Blood Glucose.: 174 mg/dL (15 Sep 2019 16:12)      PHYSICAL EXAM:  General:   HEENT:   Lymph Nodes:  Neck:   Respiratory:   Cardiovascular:   Abdomen:   Extremities:   Skin:   Neurological:  Psychiatry:    LINES:     HOSPITAL MEDICATIONS:  MEDICATIONS  (STANDING):  ALBUTerol/ipratropium for Nebulization 3 milliLiter(s) Nebulizer every 6 hours  aspirin enteric coated 81 milliGRAM(s) Oral daily  atorvastatin 20 milliGRAM(s) Oral at bedtime  buDESOnide    Inhalation Suspension 0.5 milliGRAM(s) Inhalation two times a day  chlorhexidine 4% Liquid 1 Application(s) Topical <User Schedule>  clopidogrel Tablet 75 milliGRAM(s) Oral daily  dextrose 5%. 1000 milliLiter(s) (50 mL/Hr) IV Continuous <Continuous>  dextrose 50% Injectable 12.5 Gram(s) IV Push once  dextrose 50% Injectable 25 Gram(s) IV Push once  dextrose 50% Injectable 25 Gram(s) IV Push once  docusate sodium 100 milliGRAM(s) Oral three times a day  heparin  Injectable 5000 Unit(s) SubCutaneous every 12 hours  hydrALAZINE 25 milliGRAM(s) Oral three times a day  insulin glargine Injectable (LANTUS) 7 Unit(s) SubCutaneous once  insulin lispro (HumaLOG) corrective regimen sliding scale   SubCutaneous every 6 hours  isosorbide   dinitrate Tablet (ISORDIL) 10 milliGRAM(s) Oral three times a day  metoprolol succinate ER 50 milliGRAM(s) Oral daily  pantoprazole    Tablet 40 milliGRAM(s) Oral before breakfast    MEDICATIONS  (PRN):  acetaminophen   Tablet .. 650 milliGRAM(s) Oral every 6 hours PRN Temp greater or equal to 38.5C (101.3F), Mild Pain (1 - 3)  dextrose 40% Gel 15 Gram(s) Oral once PRN Blood Glucose LESS THAN 70 milliGRAM(s)/deciliter  glucagon  Injectable 1 milliGRAM(s) IntraMuscular once PRN Glucose LESS THAN 70 milligrams/deciliter  oxyCODONE    5 mG/acetaminophen 325 mG 1 Tablet(s) Oral every 6 hours PRN Moderate Pain (4 - 6)  senna 2 Tablet(s) Oral at bedtime PRN Constipation      LABS:                        9.1    12.63 )-----------( 267      ( 15 Sep 2019 09:35 )             30.1     Hgb Trend: 9.1<--, 10.4<--  09-15    131<L>  |  84<L>  |  35<H>  ----------------------------<  456<HH>  6.2<HH>   |  14<L>  |  5.06<H>    Ca    9.9      15 Sep 2019 11:05    TPro  7.2  /  Alb  4.1  /  TBili  0.7  /  DBili  x   /  AST  61<H>  /  ALT  44  /  AlkPhos  125<H>  09-15    Creatinine Trend: 5.06<--, 4.78<--, 4.17<--  PT/INR - ( 14 Sep 2019 23:50 )   PT: 13.3 sec;   INR: 1.15 ratio         PTT - ( 14 Sep 2019 23:50 )  PTT:28.0 sec      Venous Blood Gas:  09-14 @ 23:29  7.41/49/26/31/39  VBG Lactate: 1.7      MICROBIOLOGY:     RADIOLOGY:  [ ] Reviewed and interpreted by me    EKG:        ASSESSMENT AND PLAN    65 y/o F w/ PMHx ESRD (HD M/T/T/Sat), IDDM, CHF, CAD, ischemic cardiomyopathy presents to the ED with upper respiratory illness and DKA.     #Neuro  -A&Ox4    #Endo  - After dialysis (where she received insulin), her blood sugar was 172.   - BMP q4h  - AG 33  - Started on insulin 1units/hr with D10 at 50cc/hr.   - f/u acetone, beta hydroxy-butyrate    #Resp  -duonebs and pulmicort     #CV  - hx of ischemic CMP  - c/w plavix 75mg oral daily  - lipitor 20 oral daily  - hydralazine 25mg TID, isosorbide dinitrate 10mg TID, metoprolol 50 oral daily    #GI  - protonix 40mg before breakfast  - keeping NPO right now for DKA    #ID  - s/p 1 dose of vanc and zosyn  - RVP positive for rhinovirus  - no need for abx right now  - f/u UA and f/u Blood cultures    #Renal  - s/p hemodialysis to remove 1L of fluid  - will f/u renal recs    #Heme  - hemoglobin stable, will c/w to monitor    #DVT PPx  - heparin          CCM Attending. I have examined pt with residents at bedside.  65 y/o female again in DKA . PT with URI and did not take long acting insulin. In hospital pt not given long acting insulin. Pt with ESRD dependent on dialysis. Pt now with glucose > 400 and . Admit to MICU for Insulin drip and judicious fluids. Follow insulin protocol. Pt just finished dialysis.  I have spent 35 min cc time excluding procedures.    Noman Welsh MD

## 2019-09-15 NOTE — H&P ADULT - NSHPREVIEWOFSYSTEMS_GEN_ALL_CORE
REVIEW OF SYSTEMS:    CONSTITUTIONAL: + weakness, + fevers + chills  EYES/ENT: No visual changes;  No vertigo or throat pain   NECK: No pain or stiffness  RESPIRATORY: + cough, + wheezing, no hemoptysis; + shortness of breath  CARDIOVASCULAR: No chest pain or palpitations  GASTROINTESTINAL: No abdominal or epigastric pain. No nausea, vomiting, or hematemesis; No diarrhea or constipation. No melena or hematochezia.  GENITOURINARY: No dysuria, frequency or hematuria  NEUROLOGICAL: No numbness or weakness  SKIN: No itching, burning, rashes, or lesions   All other review of systems is negative unless indicated above.

## 2019-09-15 NOTE — H&P ADULT - NSHPPHYSICALEXAM_GEN_ALL_CORE
PHYSICAL EXAMINATION:  Vital Signs Last 24 Hrs  T(C): 36.8 (15 Sep 2019 07:28), Max: 38.7 (14 Sep 2019 23:20)  T(F): 98.2 (15 Sep 2019 07:28), Max: 101.6 (14 Sep 2019 23:20)  HR: 78 (15 Sep 2019 07:28) (78 - 110)  BP: 124/56 (15 Sep 2019 07:28) (118/55 - 141/80)  BP(mean): 76 (15 Sep 2019 06:10) (73 - 98)  RR: 15 (15 Sep 2019 07:28) (15 - 35)  SpO2: 98% (15 Sep 2019 07:28) (87% - 100%)  CAPILLARY BLOOD GLUCOSE          GENERAL: sick  HEAD:  atraumatic, normocephalic  EYES: sclera anicteric  ENMT: mucous membranes moist  NECK: supple, No JVD  CHEST/LUNG: few rhonchi to auscultation bilaterally; no rales + wheezing b/l  HEART: normal S1, S2  ABDOMEN: BS+, soft, ND, NT   EXTREMITIES:  2+ edema b/l LEs L>R  NEURO: awake, alert, interactive; moves all extremities  SKIN: no rashes or lesions

## 2019-09-15 NOTE — H&P ADULT - ASSESSMENT
64 f with    Acute bronchitis/ Viral syndrome  - nebs  - pulmonary evaluation Dr. Tate  - ID evaluation Dr. Grissom    CAD/ Elevated troponin  - telemetry  - cardiac enzymes  - continue Rx with BB, ASA, Plavix  - cardiology evaluation dr. Biswas    Acute on Chronic Systolic Congestive Heart Failure   - HD  - continue Rx  - cardiology follow    Edema legs  -  from CHF and Renal disease, old      ESRD  - HD  - Nephrology evaluation Dr. Schmidt    IDDM type 1  - ADA diet  - BS control  - Endocrine evaluation called     Anxiety and Depression  - stable    Further action as per clinical course   Pierce Viera MD pager [phone] 64 f with    Acute bronchitis/ Viral syndrome  - nebs  - pulmonary evaluation Dr. Tate  - ID evaluation Dr. Grissom    CAD/ Elevated troponin  - telemetry  - cardiac enzymes  - continue Rx with BB, ASA, Plavix  - cardiology evaluation dr. Biswas    Acute on Chronic Systolic Congestive Heart Failure   - HD  - continue Rx  - cardiology follow    Edema legs  -  from CHF and Renal disease, old      ESRD  - HD  - Nephrology evaluation Dr. Schmidt    Hyperkalemia  - follow  - nephrology evaluation    IDDM type 1  - BMP, VBG, acetone  - ADA diet  - BS control  - Endocrine evaluation called     Anxiety and Depression  - stable  d/w NP    Further action as per clinical course   Pierce Viera MD pager [phone] 64 f with    COPD exacerbation/ Viral syndrome  - nebs  - pulmonary evaluation Dr Tate  - ID evaluation Dr. Grissom re need for antibiotics    ESRD  - HD  - Nephrology evaluation Dr Schmidt    Hyperkalemia  - follow  -nephrology evaluation    IDDM type 1/ DKA  - BS control  - Endocrine evaluation  - ICU evaluation    CAD  - continue Rx    Acute on Chronic Systolic Congestive Heart Failure  - HD  - Cardiology evaluation dr. Biswas/ [place]    Anxiety/ Depression  - stable    Further action as per clinical course  Pierce iVera MD pager [phone]

## 2019-09-16 LAB
ALBUMIN SERPL ELPH-MCNC: 3.3 G/DL — SIGNIFICANT CHANGE UP (ref 3.3–5)
ALBUMIN SERPL ELPH-MCNC: 3.7 G/DL — SIGNIFICANT CHANGE UP (ref 3.3–5)
ALP SERPL-CCNC: 102 U/L — SIGNIFICANT CHANGE UP (ref 40–120)
ALP SERPL-CCNC: 106 U/L — SIGNIFICANT CHANGE UP (ref 40–120)
ALT FLD-CCNC: 33 U/L — SIGNIFICANT CHANGE UP (ref 10–45)
ALT FLD-CCNC: 35 U/L — SIGNIFICANT CHANGE UP (ref 10–45)
ANION GAP SERPL CALC-SCNC: 18 MMOL/L — HIGH (ref 5–17)
ANION GAP SERPL CALC-SCNC: 18 MMOL/L — HIGH (ref 5–17)
AST SERPL-CCNC: 29 U/L — SIGNIFICANT CHANGE UP (ref 10–40)
AST SERPL-CCNC: 33 U/L — SIGNIFICANT CHANGE UP (ref 10–40)
B-OH-BUTYR SERPL-SCNC: 1.5 MMOL/L — HIGH
B-OH-BUTYR SERPL-SCNC: 1.7 MMOL/L — HIGH
BILIRUB SERPL-MCNC: 0.3 MG/DL — SIGNIFICANT CHANGE UP (ref 0.2–1.2)
BILIRUB SERPL-MCNC: 0.3 MG/DL — SIGNIFICANT CHANGE UP (ref 0.2–1.2)
BUN SERPL-MCNC: 23 MG/DL — SIGNIFICANT CHANGE UP (ref 7–23)
BUN SERPL-MCNC: 24 MG/DL — HIGH (ref 7–23)
CALCIUM SERPL-MCNC: 9 MG/DL — SIGNIFICANT CHANGE UP (ref 8.4–10.5)
CALCIUM SERPL-MCNC: 9.8 MG/DL — SIGNIFICANT CHANGE UP (ref 8.4–10.5)
CHLORIDE SERPL-SCNC: 91 MMOL/L — LOW (ref 96–108)
CHLORIDE SERPL-SCNC: 94 MMOL/L — LOW (ref 96–108)
CO2 SERPL-SCNC: 25 MMOL/L — SIGNIFICANT CHANGE UP (ref 22–31)
CO2 SERPL-SCNC: 26 MMOL/L — SIGNIFICANT CHANGE UP (ref 22–31)
CREAT SERPL-MCNC: 3.51 MG/DL — HIGH (ref 0.5–1.3)
CREAT SERPL-MCNC: 3.91 MG/DL — HIGH (ref 0.5–1.3)
GLUCOSE BLDC GLUCOMTR-MCNC: 157 MG/DL — HIGH (ref 70–99)
GLUCOSE BLDC GLUCOMTR-MCNC: 183 MG/DL — HIGH (ref 70–99)
GLUCOSE BLDC GLUCOMTR-MCNC: 187 MG/DL — HIGH (ref 70–99)
GLUCOSE BLDC GLUCOMTR-MCNC: 230 MG/DL — HIGH (ref 70–99)
GLUCOSE BLDC GLUCOMTR-MCNC: 230 MG/DL — HIGH (ref 70–99)
GLUCOSE BLDC GLUCOMTR-MCNC: 287 MG/DL — HIGH (ref 70–99)
GLUCOSE BLDC GLUCOMTR-MCNC: 297 MG/DL — HIGH (ref 70–99)
GLUCOSE BLDC GLUCOMTR-MCNC: 309 MG/DL — HIGH (ref 70–99)
GLUCOSE BLDC GLUCOMTR-MCNC: 325 MG/DL — HIGH (ref 70–99)
GLUCOSE SERPL-MCNC: 256 MG/DL — HIGH (ref 70–99)
GLUCOSE SERPL-MCNC: 264 MG/DL — HIGH (ref 70–99)
HBA1C BLD-MCNC: 8.1 % — HIGH (ref 4–5.6)
HBV CORE AB SER-ACNC: REACTIVE
HBV SURFACE AB SER-ACNC: <3 MIU/ML — LOW
HBV SURFACE AB SER-ACNC: SIGNIFICANT CHANGE UP
HBV SURFACE AG SER-ACNC: SIGNIFICANT CHANGE UP
HCT VFR BLD CALC: 26.2 % — LOW (ref 34.5–45)
HCV AB S/CO SERPL IA: 0.13 S/CO — SIGNIFICANT CHANGE UP (ref 0–0.99)
HCV AB SERPL-IMP: SIGNIFICANT CHANGE UP
HGB BLD-MCNC: 8.8 G/DL — LOW (ref 11.5–15.5)
MAGNESIUM SERPL-MCNC: 2.1 MG/DL — SIGNIFICANT CHANGE UP (ref 1.6–2.6)
MAGNESIUM SERPL-MCNC: 2.1 MG/DL — SIGNIFICANT CHANGE UP (ref 1.6–2.6)
MCHC RBC-ENTMCNC: 33.7 GM/DL — SIGNIFICANT CHANGE UP (ref 32–36)
MCHC RBC-ENTMCNC: 35.9 PG — HIGH (ref 27–34)
MCV RBC AUTO: 107 FL — HIGH (ref 80–100)
PHOSPHATE SERPL-MCNC: 5.3 MG/DL — HIGH (ref 2.5–4.5)
PHOSPHATE SERPL-MCNC: 6.1 MG/DL — HIGH (ref 2.5–4.5)
PLATELET # BLD AUTO: 249 K/UL — SIGNIFICANT CHANGE UP (ref 150–400)
POTASSIUM SERPL-MCNC: 4.6 MMOL/L — SIGNIFICANT CHANGE UP (ref 3.5–5.3)
POTASSIUM SERPL-MCNC: 4.6 MMOL/L — SIGNIFICANT CHANGE UP (ref 3.5–5.3)
POTASSIUM SERPL-SCNC: 4.6 MMOL/L — SIGNIFICANT CHANGE UP (ref 3.5–5.3)
POTASSIUM SERPL-SCNC: 4.6 MMOL/L — SIGNIFICANT CHANGE UP (ref 3.5–5.3)
PROT SERPL-MCNC: 6 G/DL — SIGNIFICANT CHANGE UP (ref 6–8.3)
PROT SERPL-MCNC: 6.5 G/DL — SIGNIFICANT CHANGE UP (ref 6–8.3)
RBC # BLD: 2.46 M/UL — LOW (ref 3.8–5.2)
RBC # FLD: 12.9 % — SIGNIFICANT CHANGE UP (ref 10.3–14.5)
SODIUM SERPL-SCNC: 135 MMOL/L — SIGNIFICANT CHANGE UP (ref 135–145)
SODIUM SERPL-SCNC: 137 MMOL/L — SIGNIFICANT CHANGE UP (ref 135–145)
WBC # BLD: 8 K/UL — SIGNIFICANT CHANGE UP (ref 3.8–10.5)
WBC # FLD AUTO: 8 K/UL — SIGNIFICANT CHANGE UP (ref 3.8–10.5)

## 2019-09-16 PROCEDURE — 99233 SBSQ HOSP IP/OBS HIGH 50: CPT

## 2019-09-16 PROCEDURE — 99291 CRITICAL CARE FIRST HOUR: CPT

## 2019-09-16 RX ORDER — INSULIN LISPRO 100/ML
VIAL (ML) SUBCUTANEOUS AT BEDTIME
Refills: 0 | Status: DISCONTINUED | OUTPATIENT
Start: 2019-09-16 | End: 2019-09-17

## 2019-09-16 RX ORDER — SODIUM CHLORIDE 9 MG/ML
1000 INJECTION, SOLUTION INTRAVENOUS ONCE
Refills: 0 | Status: COMPLETED | OUTPATIENT
Start: 2019-09-16 | End: 2019-09-16

## 2019-09-16 RX ORDER — INSULIN LISPRO 100/ML
4 VIAL (ML) SUBCUTANEOUS
Refills: 0 | Status: DISCONTINUED | OUTPATIENT
Start: 2019-09-16 | End: 2019-09-17

## 2019-09-16 RX ORDER — ERYTHROPOIETIN 10000 [IU]/ML
10000 INJECTION, SOLUTION INTRAVENOUS; SUBCUTANEOUS ONCE
Refills: 0 | Status: COMPLETED | OUTPATIENT
Start: 2019-09-16 | End: 2019-09-16

## 2019-09-16 RX ADMIN — Medication 3: at 12:54

## 2019-09-16 RX ADMIN — ERYTHROPOIETIN 10000 UNIT(S): 10000 INJECTION, SOLUTION INTRAVENOUS; SUBCUTANEOUS at 17:09

## 2019-09-16 RX ADMIN — SODIUM CHLORIDE 1000 MILLILITER(S): 9 INJECTION, SOLUTION INTRAVENOUS at 14:50

## 2019-09-16 RX ADMIN — OXYCODONE AND ACETAMINOPHEN 1 TABLET(S): 5; 325 TABLET ORAL at 18:40

## 2019-09-16 RX ADMIN — Medication 81 MILLIGRAM(S): at 13:35

## 2019-09-16 RX ADMIN — OXYCODONE AND ACETAMINOPHEN 1 TABLET(S): 5; 325 TABLET ORAL at 05:54

## 2019-09-16 RX ADMIN — Medication 650 MILLIGRAM(S): at 16:58

## 2019-09-16 RX ADMIN — Medication 0.5 MILLIGRAM(S): at 05:50

## 2019-09-16 RX ADMIN — OXYCODONE AND ACETAMINOPHEN 1 TABLET(S): 5; 325 TABLET ORAL at 12:00

## 2019-09-16 RX ADMIN — HEPARIN SODIUM 5000 UNIT(S): 5000 INJECTION INTRAVENOUS; SUBCUTANEOUS at 17:58

## 2019-09-16 RX ADMIN — OXYCODONE AND ACETAMINOPHEN 1 TABLET(S): 5; 325 TABLET ORAL at 06:38

## 2019-09-16 RX ADMIN — Medication 100 MILLIGRAM(S): at 21:02

## 2019-09-16 RX ADMIN — Medication 3 UNIT(S): at 12:54

## 2019-09-16 RX ADMIN — ATORVASTATIN CALCIUM 20 MILLIGRAM(S): 80 TABLET, FILM COATED ORAL at 21:02

## 2019-09-16 RX ADMIN — Medication 3 MILLILITER(S): at 23:37

## 2019-09-16 RX ADMIN — Medication 3 MILLILITER(S): at 05:50

## 2019-09-16 RX ADMIN — Medication 3 UNIT(S): at 10:38

## 2019-09-16 RX ADMIN — Medication 3 MILLILITER(S): at 11:21

## 2019-09-16 RX ADMIN — INSULIN GLARGINE 7 UNIT(S): 100 INJECTION, SOLUTION SUBCUTANEOUS at 21:03

## 2019-09-16 RX ADMIN — Medication 3: at 17:52

## 2019-09-16 RX ADMIN — Medication 50 MILLIGRAM(S): at 06:37

## 2019-09-16 RX ADMIN — Medication 4 UNIT(S): at 17:52

## 2019-09-16 RX ADMIN — Medication 0.5 MILLIGRAM(S): at 17:46

## 2019-09-16 RX ADMIN — OXYCODONE AND ACETAMINOPHEN 1 TABLET(S): 5; 325 TABLET ORAL at 17:58

## 2019-09-16 RX ADMIN — PANTOPRAZOLE SODIUM 40 MILLIGRAM(S): 20 TABLET, DELAYED RELEASE ORAL at 10:31

## 2019-09-16 RX ADMIN — Medication 25 MILLIGRAM(S): at 05:34

## 2019-09-16 RX ADMIN — Medication 100 MILLIGRAM(S): at 05:34

## 2019-09-16 RX ADMIN — CLOPIDOGREL BISULFATE 75 MILLIGRAM(S): 75 TABLET, FILM COATED ORAL at 13:35

## 2019-09-16 RX ADMIN — Medication 650 MILLIGRAM(S): at 11:15

## 2019-09-16 RX ADMIN — ISOSORBIDE DINITRATE 10 MILLIGRAM(S): 5 TABLET ORAL at 05:34

## 2019-09-16 RX ADMIN — OXYCODONE AND ACETAMINOPHEN 1 TABLET(S): 5; 325 TABLET ORAL at 13:32

## 2019-09-16 RX ADMIN — Medication 650 MILLIGRAM(S): at 17:30

## 2019-09-16 RX ADMIN — CHLORHEXIDINE GLUCONATE 1 APPLICATION(S): 213 SOLUTION TOPICAL at 05:34

## 2019-09-16 RX ADMIN — Medication 4: at 10:37

## 2019-09-16 RX ADMIN — Medication 3 MILLILITER(S): at 17:38

## 2019-09-16 RX ADMIN — Medication 650 MILLIGRAM(S): at 10:32

## 2019-09-16 NOTE — PROGRESS NOTE ADULT - SUBJECTIVE AND OBJECTIVE BOX
CC: f/u for Viral URI    Patient reports comfortable, no SOB at rest, minimal cough, being readied for dialysis    REVIEW OF SYSTEMS:  All other review of systems negative (Comprehensive ROS)    Antimicrobials off    Medications Reviewed    T(F): 98.1 (09-16-19 @ 16:45), Max: 98.3 (09-16-19 @ 08:00)  HR: 84 (09-16-19 @ 17:00)  BP: 138/62 (09-16-19 @ 17:00)  RR: 25 (09-16-19 @ 17:00)  SpO2: 97% (09-16-19 @ 17:00)  Wt(kg): --    PHYSICAL EXAM:  General: alert, no acute distress  Eyes:  anicteric, no conjunctival injection, no discharge  Oropharynx: no lesions or injection 	  Neck: supple, without adenopathy  Lungs: clear to auscultation  Heart: regular rate and rhythm; systolic murmur  Abdomen: soft, nondistended, nontender, without mass or organomegaly  Skin: no lesions  Extremities: b/l leg edema L > R; L AVF  Neurologic: alert, oriented, moves all extremities    LAB RESULTS:                        8.8    8.0   )-----------( 249      ( 16 Sep 2019 02:21 )             26.2     09-16    135  |  91<L>  |  24<H>  ----------------------------<  264<H>  4.6   |  26  |  3.91<H>    Ca    9.8      16 Sep 2019 05:52  Phos  6.1     09-16  Mg     2.1     09-16    TPro  6.5  /  Alb  3.7  /  TBili  0.3  /  DBili  x   /  AST  29  /  ALT  35  /  AlkPhos  106  09-16    MICROBIOLOGY:  RECENT CULTURES:  09-15 @ 09:40 .Blood     No growth to date.    09-15 @ 04:36 .Blood     No growth to date.    Rapid Respiratory Viral Panel (09.14.19 @ 23:50)    Rapid RVP Result: Entero/Rhinovirus (RapRVP): Detected    RADIOLOGY REVIEWED:  Xray Chest 1 View-PORTABLE IMMEDIATE (09.14.19 @ 23:33) >  Left basilar linear opacity is similar to the prior study.   Lungs otherwise clear.

## 2019-09-16 NOTE — CHART NOTE - NSCHARTNOTEFT_GEN_A_CORE
MICU Transfer Note    Transfer from: MICU    Transfer to: (x) Medicine    (  ) Telemetry     (   ) RCU        (    ) Palliative         (   ) Stroke Unit          (   ) __________________    Accepting Physician:  Signout given to:     MICU COURSE:  Pt is a 65 y/o F w/ PMHx ESRD (HD M/T/T/Sat), IDDM, CHF, CAD, ischemic cardiomyopathy presents to the ED BIBA for fever and AMS in addition to a productive cough w/ yellow sputum for "days". Pt found to be in DKA with blood sugars were >300 and beta hydroxy-butyrate of 5.9.  BMP showed Na 131, K 6.2 (slightly hemolyzed) Cl 84, CO2 14, BUN 35 and Cr 5.06 with anion gap of 33. Patient notes that his home dose of missed lantus dose the day prior to ED visit. In addition, she also had a fever of 101.6, WBC 11.9, Hb 10. Troponin 365->367->371. CXR in ED showed left basilar linear opacity that is similar to a prior study. Rapid RVP result was positive for entero/rhinovirus. ABG showed pH 7.41, pCO2 49, pO2 26, HCO3 31. BNP is 35,000. CK 24 and CKMB is 5.9. He received a dose of Vanc/Zosyn in the ED. Pt then transferred to MICU for DKA and URI management.     In the MICU, pt was started on insulin gtt and D10 at 50cc/hr. Endo was consulted. AG resolving (33-> 18) and bicarb, FS improving. beta hydroxy-butyrate downtrending from 5.9 -> 1.7. Insulin gtt was then transitioned to Lantus 7U daily and Humalog 4U TID on 9/16 following improvement of AG.  Now on D5NS 550cc/hr. HbA1c elevated at 8.1. Blood cx 9/15 NGTD. Pt has been afebrile for >24 hours and WBC downtrending from 12.6 to 8.0 (9/16). Pt not on abx. Pt w/ pmh of ESRD and received dialysis on 9/16. Pt initially NPO but transitioned to DASH diet on 9/16. Pending transfer to floor today.    ASSESSMENT & PLAN:   5 y/o F w/ PMHx ESRD (HD M/T/T/Sat), IDDM, CHF, CAD, ischemic cardiomyopathy presents to the ED with upper respiratory illness and DKA. AG resolving and pt pending transfer to floor today.     #Neuro  -A&Ox4    #Endo  - After dialysis (where she received insulin), her blood sugar was 172.   - BMP q4h  - AG closed  - lantus 7 humalog 4    #Resp  -duonebs and pulmicort     #CV  - hx of ischemic CMP  - c/w plavix 75mg oral daily  - lipitor 20 oral daily  - hydralazine 25mg TID, isosorbide dinitrate 10mg TID, metoprolol 50 oral daily - holding for dialysis    #GI  - protonix 40mg before breakfast  - on a diet now    #ID  - s/p 1 dose of vanc and zosyn  - RVP positive for rhinovirus  - no need for abx right now  - f/u UA and f/u Blood cultures    #Renal  - will get dialysis 9/16   - will f/u renal recs    #Heme  - hemoglobin stable, will c/w to monitor    #DVT PPx  - heparin    FOR FOLLOW UP: Endo consult, card consult, f/u BMP and BG MICU Transfer Note    Transfer from: MICU    Transfer to: (x) Medicine    (  ) Telemetry     (   ) RCU        (    ) Palliative         (   ) Stroke Unit          (   ) __________________    Accepting Physician:  Signout given to:     MICU COURSE:  Pt is a 63 y/o F w/ PMHx ESRD (HD M/T/T/Sat), IDDM, CHF, CAD, ischemic cardiomyopathy presents to the ED BIBA for fever and AMS in addition to a productive cough w/ yellow sputum for "days". Pt found to be in DKA with blood sugars were >300 and beta hydroxy-butyrate of 5.9.  BMP showed Na 131, K 6.2 (slightly hemolyzed) Cl 84, CO2 14, BUN 35 and Cr 5.06 with anion gap of 33. Patient notes that his home dose of missed lantus dose the day prior to ED visit. In addition, she also had a fever of 101.6, WBC 11.9, Hb 10. Troponin 365->367->371. CXR in ED showed left basilar linear opacity that is similar to a prior study. Rapid RVP result was positive for entero/rhinovirus. ABG showed pH 7.41, pCO2 49, pO2 26, HCO3 31. BNP is 35,000. CK 24 and CKMB is 5.9. He received a dose of Vanc/Zosyn in the ED. Pt then transferred to MICU for DKA and URI management.     In the MICU, pt was started on insulin gtt and D10 at 50cc/hr. Endo was consulted. AG resolving (33-> 18) and bicarb, FS improving. beta hydroxy-butyrate downtrending from 5.9 -> 1.7. Insulin gtt was then transitioned to Lantus 7U daily and Humalog 4U TID on 9/16 following improvement of Anion Gap.  Now on D5NS 550cc/hr. HbA1c elevated at 8.1. Blood cx 9/15 NGTD. Pt has been afebrile for >24 hours and WBC downtrending from 12.6 to 8.0 (9/16). Pt not on abx. Pt w/ pmh of ESRD and received dialysis on 9/16. Pt initially NPO but transitioned to DASH diet on 9/16. Pending transfer to floor today.    ASSESSMENT & PLAN:   63 y/o F w/ PMHx ESRD (HD M/T/T/Sat), IDDM, CHF, CAD, ischemic cardiomyopathy presents to the ED with upper respiratory illness and DKA. AG resolving and pt pending transfer to floor today.     #Neuro  -A&Ox4    #Endo  - After dialysis (where she received insulin), her blood sugar was 172.   - BMP 12h and s/p dialysis  - AG closed  - lantus 7 humalog 4    #Resp  -duonebs and pulmicort     #CV  - hx of ischemic CMP  - c/w plavix 75mg oral daily  - lipitor 20 oral daily  - hydralazine 25mg TID, isosorbide dinitrate 10mg TID, metoprolol 50 oral daily - holding for dialysis    #GI  - protonix 40mg before breakfast  - on a diet now    #ID  - s/p 1 dose of vanc and zosyn  - RVP positive for rhinovirus  - no need for abx right now  - f/u UA and f/u Blood cultures    #Renal  - will get dialysis 9/16   - will f/u renal recs    #Heme  - hemoglobin stable, will c/w to monitor    #DVT PPx  - heparin    FOR FOLLOW UP:   - Endo consult recs  - f/u BMP   - keep lantus at 7 and humalog at 4 for now with low corrective scale at bedtime. Patient is very insulin sensitive. MICU Transfer Note    Transfer from: MICU    Transfer to: (x) Medicine    (  ) Telemetry     (   ) RCU        (    ) Palliative         (   ) Stroke Unit          (   ) __________________    Accepting Physician: Dr. Viera    Signout given to:     MICU COURSE:  Pt is a 63 y/o F w/ PMHx ESRD (HD M/T/T/Sat), IDDM, CHF, CAD, ischemic cardiomyopathy presents to the ED BIBA for fever and AMS in addition to a productive cough w/ yellow sputum for "days". Pt found to be in DKA with blood sugars were >300 and beta hydroxy-butyrate of 5.9.  BMP showed Na 131, K 6.2 (slightly hemolyzed) Cl 84, CO2 14, BUN 35 and Cr 5.06 with anion gap of 33. Patient notes that his home dose of missed lantus dose the day prior to ED visit. In addition, she also had a fever of 101.6, WBC 11.9, Hb 10. Troponin 365->367->371. CXR in ED showed left basilar linear opacity that is similar to a prior study. Rapid RVP result was positive for entero/rhinovirus. ABG showed pH 7.41, pCO2 49, pO2 26, HCO3 31. BNP is 35,000. CK 24 and CKMB is 5.9. He received a dose of Vanc/Zosyn in the ED. Pt then transferred to MICU for DKA and URI management.     In the MICU, pt was started on insulin gtt and D10 at 50cc/hr. Endo was consulted. AG resolving (33-> 18) and bicarb, FS improving. beta hydroxy-butyrate downtrending from 5.9 -> 1.7. Insulin gtt was never started, but patient was transitioned to Lantus 7U daily and Humalog 4U TID on 9/16 following improvement of Anion Gap.  Now on D5NS 550cc/hr. HbA1c elevated at 8.1. Blood cx 9/15 NGTD. Pt has been afebrile for >24 hours and WBC downtrending from 12.6 to 8.0 (9/16). Pt not on abx. Pt w/ pmh of ESRD and received dialysis on 9/16. Pt initially NPO but transitioned to DASH diet on 9/16. Pending transfer to floor today.    ASSESSMENT & PLAN:   63 y/o F w/ PMHx ESRD (HD M/T/T/Sat), IDDM, CHF, CAD, ischemic cardiomyopathy presents to the ED with upper respiratory illness and DKA. AG resolving and pt pending transfer to floor today.     #Neuro  -A&Ox4    #Endo  - After dialysis (where she received insulin), her blood sugar was 172.   - BMP 12h and s/p dialysis  - AG closed  - lantus 7 humalog 4    #Resp  -duonebs and pulmicort     #CV  - hx of ischemic CMP  - c/w plavix 75mg oral daily  - lipitor 20 oral daily  - hydralazine 25mg TID, isosorbide dinitrate 10mg TID, metoprolol 50 oral daily - holding for dialysis    #GI  - protonix 40mg before breakfast  - on a diet now    #ID  - s/p 1 dose of vanc and zosyn  - RVP positive for rhinovirus  - no need for abx right now  - f/u UA and f/u Blood cultures    #Renal  - will get dialysis 9/16   - will f/u renal recs    #Heme  - hemoglobin stable, will c/w to monitor    #DVT PPx  - heparin    FOR FOLLOW UP:   - Endo consult recs  - f/u BMP   - keep lantus at 7 and humalog at 4 for now with low corrective scale at bedtime. Patient is very insulin sensitive. MICU Transfer Note    Transfer from: MICU    Transfer to: (x) Medicine    (  ) Telemetry     (   ) RCU        (    ) Palliative         (   ) Stroke Unit          (   ) __________________    Accepting Physician: Dr. Viera [phone]    Signout given to:     MICU COURSE:  Pt is a 65 y/o F w/ PMHx ESRD (HD M/T/T/Sat), IDDM, CHF, CAD, ischemic cardiomyopathy presents to the ED BIBA for fever and AMS in addition to a productive cough w/ yellow sputum for "days". Pt found to be in DKA with blood sugars were >300 and beta hydroxy-butyrate of 5.9.  BMP showed Na 131, K 6.2 (slightly hemolyzed) Cl 84, CO2 14, BUN 35 and Cr 5.06 with anion gap of 33. Patient notes that his home dose of missed lantus dose the day prior to ED visit. In addition, she also had a fever of 101.6, WBC 11.9, Hb 10. Troponin 365->367->371. CXR in ED showed left basilar linear opacity that is similar to a prior study. Rapid RVP result was positive for entero/rhinovirus. ABG showed pH 7.41, pCO2 49, pO2 26, HCO3 31. BNP is 35,000. CK 24 and CKMB is 5.9. He received a dose of Vanc/Zosyn in the ED. Pt then transferred to MICU for DKA and URI management.     In the MICU, pt was started on insulin gtt and D10 at 50cc/hr. Endo was consulted. AG resolving (33-> 18) and bicarb, FS improving. beta hydroxy-butyrate downtrending from 5.9 -> 1.7. Insulin gtt was never started, but patient was transitioned to Lantus 7U daily and Humalog 4U TID on 9/16 following improvement of Anion Gap.  Now on D5NS 550cc/hr. HbA1c elevated at 8.1. Blood cx 9/15 NGTD. Pt has been afebrile for >24 hours and WBC downtrending from 12.6 to 8.0 (9/16). Pt not on abx. Pt w/ pmh of ESRD and received dialysis on 9/16. Pt initially NPO but transitioned to DASH diet on 9/16. Pending transfer to floor today.    ASSESSMENT & PLAN:   65 y/o F w/ PMHx ESRD (HD M/T/T/Sat), IDDM, CHF, CAD, ischemic cardiomyopathy presents to the ED with upper respiratory illness and DKA. AG resolving and pt pending transfer to floor today.     #Neuro  -A&Ox4    #Endo  - BMP daily   - AG closed  - lantus 7 humalog 4  - as per endo: sliding scale low corrective dose, 2am low dose correction and standing 2humalog at bedtime only if patient is snacking.      #Resp  -duonebs and pulmicort   -chest PT    #CV  - hx of ischemic CMP  - c/w plavix 75mg oral daily  - lipitor 20 oral daily  - hydralazine 25mg TID, isosorbide dinitrate 10mg TID, metoprolol 50 oral daily       #GI  - protonix 40mg before breakfast  - on a diet now    #ID  - s/p 1 dose of vanc and zosyn  - RVP positive for rhinovirus  - no need for abx right now  - f/u UA and f/u Blood cultures (thus far negative)    #Renal  ESRD  - s/p dialysis 9/16 and 9/16, but as per renal, will not get dialysis on 9/17  - will f/u renal recs    #Heme  - hemoglobin stable, will c/w to monitor    #DVT PPx  - heparin    #MSK  Chronic pain (shoulder)  -On percocet PRN (has not needed it)    FOR FOLLOW UP:   - Endo consult recs  - f/u BMP daily  - keep lantus at 7 and humalog at 4 for now with low corrective scale at bedtime. Patient is very insulin sensitive. sliding scale low corrective dose, 2am low dose correction and standing 2humalog at bedtime only if patient is snacking.

## 2019-09-16 NOTE — PROGRESS NOTE ADULT - SUBJECTIVE AND OBJECTIVE BOX
CHIEF COMPLAINT:    Interval Events:    REVIEW OF SYSTEMS:  Constitutional: [ ] negative [ ] fevers [ ] chills [ ] weight loss [ ] weight gain  HEENT: [ ] negative [ ] dry eyes [ ] eye irritation [ ] postnasal drip [ ] nasal congestion  CV: [ ] negative  [ ] chest pain [ ] orthopnea [ ] palpitations [ ] murmur  Resp: [ ] negative [ ] cough [ ] shortness of breath [ ] dyspnea [ ] wheezing [ ] sputum [ ] hemoptysis  GI: [ ] negative [ ] nausea [ ] vomiting [ ] diarrhea [ ] constipation [ ] abd pain [ ] dysphagia   : [ ] negative [ ] dysuria [ ] nocturia [ ] hematuria [ ] increased urinary frequency  Musculoskeletal: [ ] negative [ ] back pain [ ] myalgias [ ] arthralgias [ ] fracture  Skin: [ ] negative [ ] rash [ ] itch  Neurological: [ ] negative [ ] headache [ ] dizziness [ ] syncope [ ] weakness [ ] numbness  Psychiatric: [ ] negative [ ] anxiety [ ] depression  Endocrine: [ ] negative [ ] diabetes [ ] thyroid problem  Hematologic/Lymphatic: [ ] negative [ ] anemia [ ] bleeding problem  Allergic/Immunologic: [ ] negative [ ] itchy eyes [ ] nasal discharge [ ] hives [ ] angioedema  [ ] All other systems negative  [ ] Unable to assess ROS because ________    OBJECTIVE:  ICU Vital Signs Last 24 Hrs  T(C): 36.8 (16 Sep 2019 12:00), Max: 36.8 (15 Sep 2019 15:40)  T(F): 98.2 (16 Sep 2019 12:00), Max: 98.3 (16 Sep 2019 08:00)  HR: 124 (16 Sep 2019 14:00) (69 - 124)  BP: 78/51 (16 Sep 2019 14:00) (78/51 - 140/63)  BP(mean): 59 (16 Sep 2019 14:00) (59 - 92)  ABP: --  ABP(mean): --  RR: 24 (16 Sep 2019 14:00) (11 - 32)  SpO2: 91% (16 Sep 2019 14:00) (58% - 100%)        09-15 @ 07:01  -  09-16 @ 07:00  --------------------------------------------------------  IN: 237 mL / OUT: 1000 mL / NET: -763 mL    09-16 @ 07:01  - 09-16 @ 15:31  --------------------------------------------------------  IN: 1480 mL / OUT: 0 mL / NET: 1480 mL      CAPILLARY BLOOD GLUCOSE      POCT Blood Glucose.: 287 mg/dL (16 Sep 2019 12:52)      PHYSICAL EXAM:  General:   HEENT:   Lymph Nodes:  Neck:   Respiratory:   Cardiovascular:   Abdomen:   Extremities:   Skin:   Neurological:  Psychiatry:    LINES:    HOSPITAL MEDICATIONS:  Standing Meds:  ALBUTerol/ipratropium for Nebulization 3 milliLiter(s) Nebulizer every 6 hours  aspirin enteric coated 81 milliGRAM(s) Oral daily  atorvastatin 20 milliGRAM(s) Oral at bedtime  buDESOnide    Inhalation Suspension 0.5 milliGRAM(s) Inhalation two times a day  chlorhexidine 4% Liquid 1 Application(s) Topical <User Schedule>  clopidogrel Tablet 75 milliGRAM(s) Oral daily  dextrose 5%. 1000 milliLiter(s) IV Continuous <Continuous>  dextrose 50% Injectable 12.5 Gram(s) IV Push once  dextrose 50% Injectable 25 Gram(s) IV Push once  dextrose 50% Injectable 25 Gram(s) IV Push once  docusate sodium 100 milliGRAM(s) Oral three times a day  epoetin azalea Injectable 80028 Unit(s) SubCutaneous once  heparin  Injectable 5000 Unit(s) SubCutaneous every 12 hours  hydrALAZINE 25 milliGRAM(s) Oral three times a day  insulin glargine Injectable (LANTUS) 7 Unit(s) SubCutaneous at bedtime  insulin lispro (HumaLOG) corrective regimen sliding scale   SubCutaneous at bedtime  insulin lispro (HumaLOG) corrective regimen sliding scale   SubCutaneous three times a day before meals  insulin lispro Injectable (HumaLOG) 4 Unit(s) SubCutaneous three times a day with meals  isosorbide   dinitrate Tablet (ISORDIL) 10 milliGRAM(s) Oral three times a day  metoprolol succinate ER 50 milliGRAM(s) Oral daily  pantoprazole    Tablet 40 milliGRAM(s) Oral before breakfast      PRN Meds:  acetaminophen   Tablet .. 650 milliGRAM(s) Oral every 6 hours PRN  dextrose 40% Gel 15 Gram(s) Oral once PRN  glucagon  Injectable 1 milliGRAM(s) IntraMuscular once PRN  oxyCODONE    5 mG/acetaminophen 325 mG 1 Tablet(s) Oral every 6 hours PRN  senna 2 Tablet(s) Oral at bedtime PRN      LABS:                        8.8    8.0   )-----------( 249      ( 16 Sep 2019 02:21 )             26.2     Hgb Trend: 8.8<--, 8.6<--, 10.2<--, 9.1<--, 10.4<--  09-16    135  |  91<L>  |  24<H>  ----------------------------<  264<H>  4.6   |  26  |  3.91<H>    Ca    9.8      16 Sep 2019 05:52  Phos  6.1     09-16  Mg     2.1     09-16    TPro  6.5  /  Alb  3.7  /  TBili  0.3  /  DBili  x   /  AST  29  /  ALT  35  /  AlkPhos  106  09-16    Creatinine Trend: 3.91<--, 3.51<--, 3.21<--, 2.70<--, 5.06<--, 4.78<--  PT/INR - ( 14 Sep 2019 23:50 )   PT: 13.3 sec;   INR: 1.15 ratio         PTT - ( 14 Sep 2019 23:50 )  PTT:28.0 sec      Venous Blood Gas:  09-14 @ 23:29  7.41/49/26/31/39  VBG Lactate: 1.7      MICROBIOLOGY:     Culture - Blood (collected 15 Sep 2019 09:40)  Source: .Blood  Preliminary Report (16 Sep 2019 10:00):    No growth to date.    Culture - Blood (collected 15 Sep 2019 04:36)  Source: .Blood  Preliminary Report (16 Sep 2019 05:00):    No growth to date.      RADIOLOGY:  [ ] Reviewed and interpreted by me    EKG: CHIEF COMPLAINT:  65 y/o F w/ PMHx ESRD (HD M/T/T/Sat), IDDM, CHF, CAD, ischemic cardiomyopathy presents to the ED BIBA for fever and AMS. She also reports a productive cough w/ yellow sputum for "days". She otherwise had her full course of dialysis today and reports chills at that time.    Interval Events:  Overnight, patient received lantus 7 at nighttime, and blood sugars have been well <300. She was given a diet and transitioned to lantus 7 and humalog 4TID with low corrective sliding scale. She denies any other complaints.     REVIEW OF SYSTEMS:  Constitutional: [x] negative [ ] fevers [ ] chills [ ] weight loss [ ] weight gain  HEENT: [x] negative [ ] dry eyes [ ] eye irritation [ ] postnasal drip [ ] nasal congestion  CV: [x] negative  [ ] chest pain [ ] orthopnea [ ] palpitations [ ] murmur  Resp: [ ] negative [x] cough [ ] shortness of breath [ ] dyspnea [ ] wheezing [ ] sputum [ ] hemoptysis  GI: [x] negative [ ] nausea [ ] vomiting [ ] diarrhea [ ] constipation [ ] abd pain [ ] dysphagia   : [x] negative [ ] dysuria [ ] nocturia [ ] hematuria [ ] increased urinary frequency  Musculoskeletal: [x] negative [ ] back pain [ ] myalgias [ ] arthralgias [ ] fracture  Skin: [x] negative [ ] rash [ ] itch  Neurological: [x] negative [ ] headache [ ] dizziness [ ] syncope [ ] weakness [ ] numbness  Psychiatric: [x] negative [ ] anxiety [ ] depression  Endocrine: [x] negative [ ] diabetes [ ] thyroid problem  Hematologic/Lymphatic: [x] negative [ ] anemia [ ] bleeding problem  Allergic/Immunologic: [x] negative [ ] itchy eyes [ ] nasal discharge [ ] hives [ ] angioedema  [ ] All other systems negative  [ ] Unable to assess ROS because ________    OBJECTIVE:  ICU Vital Signs Last 24 Hrs  T(C): 36.8 (16 Sep 2019 12:00), Max: 36.8 (15 Sep 2019 15:40)  T(F): 98.2 (16 Sep 2019 12:00), Max: 98.3 (16 Sep 2019 08:00)  HR: 124 (16 Sep 2019 14:00) (69 - 124)  BP: 78/51 (16 Sep 2019 14:00) (78/51 - 140/63)  BP(mean): 59 (16 Sep 2019 14:00) (59 - 92)  ABP: --  ABP(mean): --  RR: 24 (16 Sep 2019 14:00) (11 - 32)  SpO2: 91% (16 Sep 2019 14:00) (58% - 100%)        09-15 @ 07:01 - 09-16 @ 07:00  --------------------------------------------------------  IN: 237 mL / OUT: 1000 mL / NET: -763 mL    09-16 @ 07:01  - 09-16 @ 15:31  --------------------------------------------------------  IN: 1480 mL / OUT: 0 mL / NET: 1480 mL      CAPILLARY BLOOD GLUCOSE      POCT Blood Glucose.: 287 mg/dL (16 Sep 2019 12:52)      PHYSICAL EXAM:  General: NAD, patient resting comfortably in bed  HEENT: equal and reactive to light  Lymph Nodes: no lymphadenopathy  Neck: supple  Respiratory: some coarse diffuse wheezing  Cardiovascular: normal S1, S2, RRR  Abdomen: soft, nontender  Extremities: no LE edema  Skin: intact  Neurological: A&Ox4    LINES:    HOSPITAL MEDICATIONS:  Standing Meds:  ALBUTerol/ipratropium for Nebulization 3 milliLiter(s) Nebulizer every 6 hours  aspirin enteric coated 81 milliGRAM(s) Oral daily  atorvastatin 20 milliGRAM(s) Oral at bedtime  buDESOnide    Inhalation Suspension 0.5 milliGRAM(s) Inhalation two times a day  chlorhexidine 4% Liquid 1 Application(s) Topical <User Schedule>  clopidogrel Tablet 75 milliGRAM(s) Oral daily  dextrose 5%. 1000 milliLiter(s) IV Continuous <Continuous>  dextrose 50% Injectable 12.5 Gram(s) IV Push once  dextrose 50% Injectable 25 Gram(s) IV Push once  dextrose 50% Injectable 25 Gram(s) IV Push once  docusate sodium 100 milliGRAM(s) Oral three times a day  epoetin azalea Injectable 11916 Unit(s) SubCutaneous once  heparin  Injectable 5000 Unit(s) SubCutaneous every 12 hours  hydrALAZINE 25 milliGRAM(s) Oral three times a day  insulin glargine Injectable (LANTUS) 7 Unit(s) SubCutaneous at bedtime  insulin lispro (HumaLOG) corrective regimen sliding scale   SubCutaneous at bedtime  insulin lispro (HumaLOG) corrective regimen sliding scale   SubCutaneous three times a day before meals  insulin lispro Injectable (HumaLOG) 4 Unit(s) SubCutaneous three times a day with meals  isosorbide   dinitrate Tablet (ISORDIL) 10 milliGRAM(s) Oral three times a day  metoprolol succinate ER 50 milliGRAM(s) Oral daily  pantoprazole    Tablet 40 milliGRAM(s) Oral before breakfast      PRN Meds:  acetaminophen   Tablet .. 650 milliGRAM(s) Oral every 6 hours PRN  dextrose 40% Gel 15 Gram(s) Oral once PRN  glucagon  Injectable 1 milliGRAM(s) IntraMuscular once PRN  oxyCODONE    5 mG/acetaminophen 325 mG 1 Tablet(s) Oral every 6 hours PRN  senna 2 Tablet(s) Oral at bedtime PRN      LABS:                        8.8    8.0   )-----------( 249      ( 16 Sep 2019 02:21 )             26.2     Hgb Trend: 8.8<--, 8.6<--, 10.2<--, 9.1<--, 10.4<--  09-16    135  |  91<L>  |  24<H>  ----------------------------<  264<H>  4.6   |  26  |  3.91<H>    Ca    9.8      16 Sep 2019 05:52  Phos  6.1     09-16  Mg     2.1     09-16    TPro  6.5  /  Alb  3.7  /  TBili  0.3  /  DBili  x   /  AST  29  /  ALT  35  /  AlkPhos  106  09-16    Creatinine Trend: 3.91<--, 3.51<--, 3.21<--, 2.70<--, 5.06<--, 4.78<--  PT/INR - ( 14 Sep 2019 23:50 )   PT: 13.3 sec;   INR: 1.15 ratio         PTT - ( 14 Sep 2019 23:50 )  PTT:28.0 sec      Venous Blood Gas:  09-14 @ 23:29  7.41/49/26/31/39  VBG Lactate: 1.7      MICROBIOLOGY:     Culture - Blood (collected 15 Sep 2019 09:40)  Source: .Blood  Preliminary Report (16 Sep 2019 10:00):    No growth to date.    Culture - Blood (collected 15 Sep 2019 04:36)  Source: .Blood  Preliminary Report (16 Sep 2019 05:00):    No growth to date.      RADIOLOGY:  [ ] Reviewed and interpreted by me    EKG:

## 2019-09-16 NOTE — PROGRESS NOTE ADULT - ASSESSMENT
3 y/o F w/ PMHx ESRD (HD M/T/T/Sat), IDDM, CHF, CAD, ischemic cardiomyopathy presents to the ED with upper respiratory illness and DKA.     #Neuro  -A&Ox4    #Endo  - After dialysis (where she received insulin), her blood sugar was 172.   - BMP q4h  - AG closed  - lantus 7 humalog 4    #Resp  -duonebs and pulmicort     #CV  - hx of ischemic CMP  - c/w plavix 75mg oral daily  - lipitor 20 oral daily  - hydralazine 25mg TID, isosorbide dinitrate 10mg TID, metoprolol 50 oral daily - holding for dialysis    #GI  - protonix 40mg before breakfast  - on a dietnow    #ID  - s/p 1 dose of vanc and zosyn  - RVP positive for rhinovirus  - no need for abx right now  - f/u UA and f/u Blood cultures    #Renal  - will get dialysis 9/16   - will f/u renal recs    #Heme  - hemoglobin stable, will c/w to monitor    #DVT PPx  - heparin 65 y/o F w/ PMHx ESRD (HD M/T/T/Sat), IDDM, CHF, CAD, ischemic cardiomyopathy presents to the ED with upper respiratory illness and DKA.     #Neuro  -A&Ox4    #Endo  - BMP 12  - AG closed now  - lantus 7 humalog 4    #Resp  -duonebs and pulmicort     #CV  - hx of ischemic CMP  - c/w plavix 75mg oral daily  - lipitor 20 oral daily  - hydralazine 25mg TID, isosorbide dinitrate 10mg TID, metoprolol 50 oral daily - holding for dialysis    #GI  - protonix 40mg before breakfast  - on a diabetic + renal diet    #ID  - s/p 1 dose of vanc and zosyn  - RVP positive for rhinovirus  - no need for abx right now  - f/u UA and f/u Blood cultures (thus far negative)    #Renal  - will get dialysis 9/16   - will f/u renal recs    #Heme  - hemoglobin stable, will c/w to monitor    #DVT PPx  - heparin

## 2019-09-16 NOTE — CONSULT NOTE ADULT - SUBJECTIVE AND OBJECTIVE BOX
CHIEF COMPLAINT: cough, fever    HISTORY OF PRESENT ILLNESS:  65 y/o F w/ PMHx ESRD (HD M/T/T/Sat), IDDM, CHF, CAD, ischemic cardiomyopathy presents to the ED BIBA for fever and AMS. She also reports a productive cough w/ yellow sputum for "days". She otherwise had her full course of dialysis today and reports chills at that time.      Allergies  No Known Allergies      MEDICATIONS:  aspirin enteric coated 81 milliGRAM(s) Oral daily  clopidogrel Tablet 75 milliGRAM(s) Oral daily  heparin  Injectable 5000 Unit(s) SubCutaneous every 12 hours  hydrALAZINE 25 milliGRAM(s) Oral three times a day  isosorbide   dinitrate Tablet (ISORDIL) 10 milliGRAM(s) Oral three times a day  metoprolol succinate ER 50 milliGRAM(s) Oral daily  ALBUTerol/ipratropium for Nebulization 3 milliLiter(s) Nebulizer every 6 hours  buDESOnide    Inhalation Suspension 0.5 milliGRAM(s) Inhalation two times a day  acetaminophen   Tablet .. 650 milliGRAM(s) Oral every 6 hours PRN  oxyCODONE    5 mG/acetaminophen 325 mG 1 Tablet(s) Oral every 6 hours PRN  docusate sodium 100 milliGRAM(s) Oral three times a day  pantoprazole    Tablet 40 milliGRAM(s) Oral before breakfast  senna 2 Tablet(s) Oral at bedtime PRN  atorvastatin 20 milliGRAM(s) Oral at bedtime  dextrose 40% Gel 15 Gram(s) Oral once PRN  dextrose 50% Injectable 12.5 Gram(s) IV Push once  dextrose 50% Injectable 25 Gram(s) IV Push once  dextrose 50% Injectable 25 Gram(s) IV Push once  glucagon  Injectable 1 milliGRAM(s) IntraMuscular once PRN  insulin glargine Injectable (LANTUS) 7 Unit(s) SubCutaneous at bedtime  insulin lispro (HumaLOG) corrective regimen sliding scale   SubCutaneous at bedtime  insulin lispro (HumaLOG) corrective regimen sliding scale   SubCutaneous three times a day before meals  insulin lispro Injectable (HumaLOG) 3 Unit(s) SubCutaneous three times a day with meals  chlorhexidine 4% Liquid 1 Application(s) Topical <User Schedule>  dextrose 5%. 1000 milliLiter(s) IV Continuous <Continuous>      PAST MEDICAL & SURGICAL HISTORY:  COPD (chronic obstructive pulmonary disease)  Localized enlarged lymph nodes  CHF (congestive heart failure): EF 40-45%  Subclavian vein stenosis, left: s/p stent  DKA, type 1: 1/2015  ACS (acute coronary syndrome): 1/2015 - cath revealed 100% ostial stenosis not amenable to PCI - medical management  TIA (transient ischemic attack): x 2 - 8-9 years ago prior to ASD/VSD repair  CAD (coronary artery disease): s/p stents  Gout: past  CVA (cerebral infarction): with no residual, 8 yrs ago, prior to heart surgery - ST memory loss  Peripheral vascular disease: occluded left fem-pop bypass 5/2015  Diabetes mellitus type 1: Insulin Dependent -  ESRD (end stage renal disease): dialysis  M, tue, thursday, saturday  Hyperlipidemia  Status post device closure of ASD: &quot;clamshell&quot;  History of cardiac catheterization: 1/2015 - no intervention  S/P femoral-popliteal bypass surgery: L and R in 2013 with graft; 5/2015 CFA angioplasty left and ileofemoral endarterectomywith vein patch angioplasty of left fem-pop bypass graft  Multiple vascular surgery both leg, left fempop bypass revision 11/2015  AV (arteriovenous fistula): Left AV graft; revision with stent placement 2-3 years ago  S/P cholecystectomy      FAMILY HISTORY:  Family history of smoking  Family history of hypertension  Family history of cancer (Sibling)      SOCIAL HISTORY:    former smoker. independent in adl    REVIEW OF SYSTEMS:  See HPI, otherwise complete 10 point review of systems negative    [ ] All others negative	    PHYSICAL EXAM:  T(C): 36.8 (09-16-19 @ 08:00), Max: 36.8 (09-15-19 @ 15:40)  HR: 87 (09-16-19 @ 08:00) (69 - 95)  BP: 88/51 (09-16-19 @ 08:00) (88/51 - 140/63)  RR: 11 (09-16-19 @ 08:00) (11 - 29)  SpO2: 93% (09-16-19 @ 08:00) (58% - 100%)  Wt(kg): --  I&O's Summary    15 Sep 2019 07:01  -  16 Sep 2019 07:00  --------------------------------------------------------  IN: 237 mL / OUT: 1000 mL / NET: -763 mL        Appearance: No Acute Distress	  HEENT:  Normal oral mucosa, PERRL, EOMI	  Cardiovascular: Normal S1 S2, No JVD, No murmurs/rubs/gallops  Respiratory: Lungs clear to auscultation bilaterally  Gastrointestinal:  Soft, Non-tender, + BS	  Skin: No rashes, No ecchymoses, No cyanosis	  Neurologic: Non-focal  Extremities: No clubbing, cyanosis or edema  Vascular: Peripheral pulses palpable 2+ bilaterally  Psychiatry: A & O x 3, Mood & affect appropriate    Laboratory Data:	 	    CBC Full  -  ( 16 Sep 2019 02:21 )  WBC Count : 8.0 K/uL  Hemoglobin : 8.8 g/dL  Hematocrit : 26.2 %  Platelet Count - Automated : 249 K/uL  Mean Cell Volume : 107.0 fl  Mean Cell Hemoglobin : 35.9 pg  Mean Cell Hemoglobin Concentration : 33.7 gm/dL  Auto Neutrophil # : x  Auto Lymphocyte # : x  Auto Monocyte # : x  Auto Eosinophil # : x  Auto Basophil # : x  Auto Neutrophil % : x  Auto Lymphocyte % : x  Auto Monocyte % : x  Auto Eosinophil % : x  Auto Basophil % : x    09-16    135  |  91<L>  |  24<H>  ----------------------------<  264<H>  4.6   |  26  |  3.91<H>  09-16    137  |  94<L>  |  23  ----------------------------<  256<H>  4.6   |  25  |  3.51<H>    Ca    9.8      16 Sep 2019 05:52  Ca    9.0      16 Sep 2019 02:21  Phos  6.1     09-16  Phos  5.3     09-16  Mg     2.1     09-16  Mg     2.1     09-16    TPro  6.5  /  Alb  3.7  /  TBili  0.3  /  DBili  x   /  AST  29  /  ALT  35  /  AlkPhos  106  09-16  TPro  6.0  /  Alb  3.3  /  TBili  0.3  /  DBili  x   /  AST  33  /  ALT  33  /  AlkPhos  102  09-16        Interpretation of Telemetry: 	nsr    ECG:  	nsr, no acute ischemic changes - significant artifact present       Assessment:  -DKA  -upper respiratory infection; RVP +  -elevated cardiac enzymes (hs trop) with relatively preserved ck/ck mb fraction and no obvious ischemic changes on ekg  -chest pain with mild ekg changes and stable hs trop  -NSVT  -pAT  -volume overload  -ischemic cardiomyopathy, cad s/p multiple stents  -esrd on hd  -PAD s/p prior intervention         Recs:  -repeat 12 lead ekg  -known extensive CAD and ICM. s/p [facility] 2/20/2019 --> severe and diffuse instent restenosis along RCA --> unable to stent due to multiple layers of stenting and prior brachytherapy  -will consider complex intervention if true ischemic and refractory sx develop   -c/w beta blockers for nsvt/pat/cad (as above). currently on toprol 50mg, cont nitrates as tolerates (isordil 10mg tid, hydral 25mg tid)  -hx of prolonged qtc. avoid qtc prolonging meds  -s/p TTE 2019: mod-severe MR, pseudo AS (low flow-low gradient), mod-severe LV dysfunction --> recent CTS consult with dr hebert appreciated. plan is for medical management given surgical risk and patient preference  -c/w asa, plavix, statin for ischemic cardiomyopathy/CAD/PAD  -dvt ppx        Greater than 50 minutes spent on total encounter; more than 50% of the visit was spent counseling and/or coordinating care by the attending physician.   	  Julián Biswas MD   Cardiovascular Diseases  [phone]

## 2019-09-16 NOTE — PROGRESS NOTE ADULT - ASSESSMENT
64y female with HLD, CAD, ESRD on HD, CHF, severe AS, COPD, CVA, PVD s/p b/l fem pop bypass, hilar adenopathy, DM, prior hospitalizations for DKA, and parainfluenza multifocal pneumonia in June  Returns with fever and AMS.   Found febrile to 101.6F & leukocytosis of 11.9K.   CXR without new infiltrate.   Resp viral panel positive for entero/rhino virus.   Elevated troponin T,  [place]NP of 65[address]   Hyperglycemic with acidosis ? DKA   also has same cold & cough   Now afebrile, alert, mental status at baseline, WBC normal  Bld Cxs negative    PLAN:  Suspect viral URI   Agree with d/c Derick and Cindy  d/w  at bedside

## 2019-09-16 NOTE — PROGRESS NOTE ADULT - SUBJECTIVE AND OBJECTIVE BOX
[facility]   [phone]  RENAL FOLLOW UP NOTE  --------------------------------------------------------------------------------  Chief Complaint:    24 hour events/subjective:    seen earlier and d/w icu team when seen   stated feeling a little better. coughing persists. sob as well     PAST HISTORY  --------------------------------------------------------------------------------  No significant changes to PMH, PSH, FHx, SHx, unless otherwise noted    ALLERGIES & MEDICATIONS  --------------------------------------------------------------------------------  Allergies    No Known Allergies    Intolerances      Standing Inpatient Medications  ALBUTerol/ipratropium for Nebulization 3 milliLiter(s) Nebulizer every 6 hours  aspirin enteric coated 81 milliGRAM(s) Oral daily  atorvastatin 20 milliGRAM(s) Oral at bedtime  buDESOnide    Inhalation Suspension 0.5 milliGRAM(s) Inhalation two times a day  chlorhexidine 4% Liquid 1 Application(s) Topical <User Schedule>  clopidogrel Tablet 75 milliGRAM(s) Oral daily  dextrose 5%. 1000 milliLiter(s) IV Continuous <Continuous>  docusate sodium 100 milliGRAM(s) Oral three times a day  heparin  Injectable 5000 Unit(s) SubCutaneous every 12 hours  hydrALAZINE 25 milliGRAM(s) Oral three times a day  insulin glargine Injectable (LANTUS) 7 Unit(s) SubCutaneous at bedtime  insulin lispro (HumaLOG) corrective regimen sliding scale   SubCutaneous three times a day before meals  insulin lispro (HumaLOG) corrective regimen sliding scale   SubCutaneous at bedtime  insulin lispro Injectable (HumaLOG) 4 Unit(s) SubCutaneous three times a day with meals  isosorbide   dinitrate Tablet (ISORDIL) 10 milliGRAM(s) Oral three times a day  metoprolol succinate ER 50 milliGRAM(s) Oral daily  pantoprazole    Tablet 40 milliGRAM(s) Oral before breakfast    PRN Inpatient Medications  acetaminophen   Tablet .. 650 milliGRAM(s) Oral every 6 hours PRN  oxyCODONE    5 mG/acetaminophen 325 mG 1 Tablet(s) Oral every 6 hours PRN  senna 2 Tablet(s) Oral at bedtime PRN      REVIEW OF SYSTEMS  --------------------------------------------------------------------------------    Gen: denies fevers/chills,  CVS: denies chest pain/palpitations  Resp: +  SOB/Cough +   GI: Denies N/V/Abd pain  : Denies dysuria    All other systems were reviewed and are negative, except as noted.    VITALS/PHYSICAL EXAM  --------------------------------------------------------------------------------  T(C): 36.7 (09-16-19 @ 16:45), Max: 36.8 (09-16-19 @ 08:00)  HR: 92 (09-16-19 @ 19:00) (78 - 124)  BP: 123/62 (09-16-19 @ 19:00) (78/51 - 138/62)  RR: 24 (09-16-19 @ 19:00) (11 - 32)  SpO2: 99% (09-16-19 @ 19:00) (58% - 100%)  Wt(kg): --  Height (cm): 162.6 (09-15-19 @ 17:00)  Weight (kg): 57.2 (09-15-19 @ 17:00)  BMI (kg/m2): 21.6 (09-15-19 @ 17:00)  BSA (m2): 1.61 (09-15-19 @ 17:00)      09-15-19 @ 07:01  -  09-16-19 @ 07:00  --------------------------------------------------------  IN: 237 mL / OUT: 1000 mL / NET: -763 mL    09-16-19 @ 07:01  -  09-16-19 @ 19:39  --------------------------------------------------------  IN: 1480 mL / OUT: 0 mL / NET: 1480 mL      Physical Exam:  	    Gen: ill appearing coughing   	+ JVD  	Pulm: decrease bs no rales or wheezing   	CV: RRR, S1S2; no rub  	Abd: +BS, soft, nontender/nondistended  	: No suprapubic tenderness  	UE: Warm, no cyanosis  no clubbing,  no edema  	LE: Warm, no cyanosis  no clubbing, 2+ pitting LLE > RLE  edema  	Neuro: still  lethargic   	Vascular access: + avf + bruit and thrill     LABS/STUDIES  --------------------------------------------------------------------------------              8.8    8.0   >-----------<  249      [09-16-19 @ 02:21]              26.2     135  |  91  |  24  ----------------------------<  264      [09-16-19 @ 05:52]  4.6   |  26  |  3.91        Ca     9.8     [09-16-19 @ 05:52]      Mg     2.1     [09-16-19 @ 05:52]      Phos  6.1     [09-16-19 @ 05:52]    TPro  6.5  /  Alb  3.7  /  TBili  0.3  /  DBili  x   /  AST  29  /  ALT  35  /  AlkPhos  106  [09-16-19 @ 05:52]    PT/INR: PT 13.3 , INR 1.15       [09-14-19 @ 23:50]  PTT: 28.0       [09-14-19 @ 23:50]          [09-15-19 @ 11:05]    Creatinine Trend:  SCr 3.91 [09-16 @ 05:52]  SCr 3.51 [09-16 @ 02:21]  SCr 3.21 [09-15 @ 22:15]  SCr 2.70 [09-15 @ 17:23]  SCr 5.06 [09-15 @ 11:05]                  Iron 42, TIBC 253, %sat 17      [06-17-19 @ 17:54]  Ferritin 1838      [06-17-19 @ 18:06]  PTH -- (Ca 9.6)      [06-17-19 @ 18:06]   177  PTH -- (Ca 7.8)      [03-29-19 @ 20:38]   73  PTH -- (Ca 7.3)      [02-22-19 @ 02:49]   59  HbA1c 8.1      [09-16-19 @ 08:04]  TSH 0.71      [11-17-18 @ 08:25]

## 2019-09-16 NOTE — PROGRESS NOTE ADULT - ASSESSMENT
63 y/o F w/ PMHx ESRD (HD M/T/T/Sat), IDDM, CHF, CAD, ischemic cardiomyopathy presents to the ED BIBA for fever. pt also in DKA, hyperglycemia and now with hyperkalemia as well as DKA    1- esrd  2- hyperkalemia resolved   3- DKA  4- HTN   5- chf      hd 3. 0  hr f 160  1.5-2 liter fluid removal  2 k bath bfr 400 dfr 600

## 2019-09-16 NOTE — PROGRESS NOTE ADULT - ASSESSMENT
64 f with    COPD exacerbation/ Viral syndrome  - nebs  - pulmonary evaluation Dr Tate  - ID evaluation noted    ESRD  - HD  - Nephrology follow    Hyperkalemia  - follow  -nephrology follow    IDDM type 1/ DKA  - BS control  - Endocrine follow  - ICU care    CAD  - continue Rx    Acute on Chronic Systolic Congestive Heart Failure  - HD  - Cardiology follow    Anxiety/ Depression  - stable    MICU care  Pierce Viera MD pager [phone]

## 2019-09-16 NOTE — PROGRESS NOTE ADULT - ASSESSMENT
63y/o M w/h/o uncontrolled TIDM (HbA1c 8.1% 9/19) c/b neuropathy and retinopathy as well as CAD s/p multiple stents, CHF (EF 50% 10/2018), TIA, PVD s/p b/l fem-pop bypass, ESRD> HD, presenting with upper respiratory illness and hyperglycemia. Developed DKA after missing basal insulin. Saw pt in MICU. Pt sleepy and reports feeling tired but better> still having some cough. Pt remains hyperglycemic> tolerating POs. No hypoglycemia. Will adjust pre meal insulin to BG goal 100s to low 200s on this pt who has multiple DM complications and comorbidities.

## 2019-09-16 NOTE — PROGRESS NOTE ADULT - SUBJECTIVE AND OBJECTIVE BOX
DIABETES FOLLOW UP NOTE: Saw pt earlier today  INTERVAL HX: 65y/o M w/h/o uncontrolled TIDM (HbA1c 8.1% 9/19) c/b neuropathy and retinopathy as well as CAD s/p multiple stents, CHF (EF 50% 10/2018), TIA, PVD s/p b/l fem-pop bypass, ESRD> HD, presenting with upper respiratory illness and hyperglycemia. Developed DKA after missing basal insulin. Saw pt in MICU. Pt sleepy and reports feeling tired but better> still having some cough but denies SOB. Pt remains hyperglycemic> tolerating POs. No hypoglycemia.         Review of Systems:  General: As above  Cardiovascular: No chest pain, palpitations  Respiratory: No SOB, + cough  GI: No nausea, vomiting, abdominal pain  Endocrine: no polyuria, polydipsia or S&Sx of hypoglycemia    Allergies    No Known Allergies    Intolerances      MEDICATIONS:  atorvastatin 20 milliGRAM(s) Oral at bedtime  insulin glargine Injectable (LANTUS) 7 Unit(s) SubCutaneous at bedtime  insulin lispro (HumaLOG) corrective regimen sliding scale   SubCutaneous at bedtime  insulin lispro (HumaLOG) corrective regimen sliding scale   SubCutaneous three times a day before meals  insulin lispro Injectable (HumaLOG) 4 Unit(s) SubCutaneous three times a day with meals      PHYSICAL EXAM:  VITALS: T(C): 36.7 (09-16-19 @ 15:00)  T(F): 98.1 (09-16-19 @ 15:00), Max: 98.3 (09-16-19 @ 08:00)  HR: 87 (09-16-19 @ 16:00) (72 - 124)  BP: 99/54 (09-16-19 @ 16:00) (78/51 - 140/63)  RR:  (11 - 32)  SpO2:  (58% - 100%)  Wt(kg): --  GENERAL: Female laying in bed in NAD  Abdomen: Soft, nontender, non distended  Extremities: Warm,   NEURO: Alert but sleepy, answer questions but doesn't elaborate.    LABS:  POCT Blood Glucose.: 287 mg/dL (09-16-19 @ 12:52)  POCT Blood Glucose.: 309 mg/dL (09-16-19 @ 10:36)  POCT Blood Glucose.: 230 mg/dL (09-16-19 @ 00:06)  POCT Blood Glucose.: 269 mg/dL (09-15-19 @ 23:02)  POCT Blood Glucose.: 325 mg/dL (09-15-19 @ 22:01)  POCT Blood Glucose.: 331 mg/dL (09-15-19 @ 21:11)  POCT Blood Glucose.: 313 mg/dL (09-15-19 @ 20:35)  POCT Blood Glucose.: 325 mg/dL (09-15-19 @ 20:05)  POCT Blood Glucose.: 215 mg/dL (09-15-19 @ 18:50)  POCT Blood Glucose.: 178 mg/dL (09-15-19 @ 18:10)  POCT Blood Glucose.: 157 mg/dL (09-15-19 @ 17:08)  POCT Blood Glucose.: 174 mg/dL (09-15-19 @ 16:12)  POCT Blood Glucose.: 122 mg/dL (09-15-19 @ 15:30)  POCT Blood Glucose.: 230 mg/dL (09-15-19 @ 14:24)  POCT Blood Glucose.: 183 mg/dL (09-15-19 @ 14:22)  POCT Blood Glucose.: 421 mg/dL (09-15-19 @ 10:30)                          8.8    8.0   )-----------( 249      ( 16 Sep 2019 02:21 )             26.2       09-16    135  |  91<L>  |  24<H>  ----------------------------<  264<H>  4.6   |  26  |  3.91<H>    EGFR if : 13<L>  EGFR if non : 12<L>    Ca    9.8      09-16  Mg     2.1     09-16  Phos  6.1     09-16    TPro  6.5  /  Alb  3.7  /  TBili  0.3  /  DBili  x   /  AST  29  /  ALT  35  /  AlkPhos  106  09-16      Hemoglobin A1C, Whole Blood: 8.1 % <H> [4.0 - 5.6] (09-16-19 @ 08:04)

## 2019-09-16 NOTE — PROGRESS NOTE ADULT - SUBJECTIVE AND OBJECTIVE BOX
Patient is a 64y old  Female who presents with a chief complaint of sob (16 Sep 2019 19:37)      SUBJECTIVE / OVERNIGHT EVENTS: Comfortable without new complaints.   Review of Systems  chest pain no  palpitations no  sob no  nausea no  headache no    MEDICATIONS  (STANDING):  ALBUTerol/ipratropium for Nebulization 3 milliLiter(s) Nebulizer every 6 hours  aspirin enteric coated 81 milliGRAM(s) Oral daily  atorvastatin 20 milliGRAM(s) Oral at bedtime  buDESOnide    Inhalation Suspension 0.5 milliGRAM(s) Inhalation two times a day  chlorhexidine 4% Liquid 1 Application(s) Topical <User Schedule>  clopidogrel Tablet 75 milliGRAM(s) Oral daily  dextrose 5%. 1000 milliLiter(s) (50 mL/Hr) IV Continuous <Continuous>  docusate sodium 100 milliGRAM(s) Oral three times a day  heparin  Injectable 5000 Unit(s) SubCutaneous every 12 hours  hydrALAZINE 25 milliGRAM(s) Oral three times a day  insulin glargine Injectable (LANTUS) 7 Unit(s) SubCutaneous at bedtime  insulin lispro (HumaLOG) corrective regimen sliding scale   SubCutaneous three times a day before meals  insulin lispro (HumaLOG) corrective regimen sliding scale   SubCutaneous at bedtime  insulin lispro Injectable (HumaLOG) 4 Unit(s) SubCutaneous three times a day with meals  isosorbide   dinitrate Tablet (ISORDIL) 10 milliGRAM(s) Oral three times a day  metoprolol succinate ER 50 milliGRAM(s) Oral daily  pantoprazole    Tablet 40 milliGRAM(s) Oral before breakfast    MEDICATIONS  (PRN):  acetaminophen   Tablet .. 650 milliGRAM(s) Oral every 6 hours PRN Temp greater or equal to 38.5C (101.3F), Mild Pain (1 - 3)  oxyCODONE    5 mG/acetaminophen 325 mG 1 Tablet(s) Oral every 6 hours PRN Moderate Pain (4 - 6)  senna 2 Tablet(s) Oral at bedtime PRN Constipation      Vital Signs Last 24 Hrs  T(C): 36.7 (16 Sep 2019 19:50), Max: 36.8 (16 Sep 2019 08:00)  T(F): 98.1 (16 Sep 2019 19:50), Max: 98.3 (16 Sep 2019 08:00)  HR: 91 (16 Sep 2019 19:50) (78 - 124)  BP: 97/52 (16 Sep 2019 19:50) (78/51 - 138/62)  BP(mean): 71 (16 Sep 2019 19:50) (59 - 93)  RR: 23 (16 Sep 2019 19:50) (11 - 32)  SpO2: 99% (16 Sep 2019 19:50) (58% - 100%)    PHYSICAL EXAM:  GENERAL: NAD  HEAD:  Atraumatic, Normocephalic  EYES: EOMI, PERRLA, conjunctiva and sclera clear  NECK: Supple, No JVD  CHEST/LUNG: Clear to auscultation bilaterally; No wheeze  HEART: Regular rate and rhythm; No murmurs, rubs, or gallops  ABDOMEN: Soft, Nontender, Nondistended; Bowel sounds present  EXTREMITIES:  2+ bipedal edema L>R  PSYCH: AAOx3  NEUROLOGY: non-focal  SKIN: No rashes or lesions    LABS:                        8.8    8.0   )-----------( 249      ( 16 Sep 2019 02:21 )             26.2     09-16    135  |  91<L>  |  24<H>  ----------------------------<  264<H>  4.6   |  26  |  3.91<H>    Ca    9.8      16 Sep 2019 05:52  Phos  6.1     09-16  Mg     2.1     09-16    TPro  6.5  /  Alb  3.7  /  TBili  0.3  /  DBili  x   /  AST  29  /  ALT  35  /  AlkPhos  106  09-16    PT/INR - ( 14 Sep 2019 23:50 )   PT: 13.3 sec;   INR: 1.15 ratio         PTT - ( 14 Sep 2019 23:50 )  PTT:28.0 sec  CARDIAC MARKERS ( 15 Sep 2019 11:05 )  x     / x     / 240 U/L / x     / 5.9 ng/mL          Culture - Blood (collected 15 Sep 2019 09:40)  Source: .Blood  Preliminary Report (16 Sep 2019 10:00):    No growth to date.    Culture - Blood (collected 15 Sep 2019 04:36)  Source: .Blood  Preliminary Report (16 Sep 2019 05:00):    No growth to date.        RADIOLOGY & ADDITIONAL TESTS:    Imaging Personally Reviewed:    Consultant(s) Notes Reviewed:      Care Discussed with Consultants/Other Providers:

## 2019-09-17 LAB
ALBUMIN SERPL ELPH-MCNC: 3.6 G/DL — SIGNIFICANT CHANGE UP (ref 3.3–5)
ALP SERPL-CCNC: 103 U/L — SIGNIFICANT CHANGE UP (ref 40–120)
ALT FLD-CCNC: 30 U/L — SIGNIFICANT CHANGE UP (ref 10–45)
ANION GAP SERPL CALC-SCNC: 17 MMOL/L — SIGNIFICANT CHANGE UP (ref 5–17)
AST SERPL-CCNC: 24 U/L — SIGNIFICANT CHANGE UP (ref 10–40)
BILIRUB SERPL-MCNC: 0.3 MG/DL — SIGNIFICANT CHANGE UP (ref 0.2–1.2)
BUN SERPL-MCNC: 15 MG/DL — SIGNIFICANT CHANGE UP (ref 7–23)
CALCIUM SERPL-MCNC: 9 MG/DL — SIGNIFICANT CHANGE UP (ref 8.4–10.5)
CHLORIDE SERPL-SCNC: 96 MMOL/L — SIGNIFICANT CHANGE UP (ref 96–108)
CO2 SERPL-SCNC: 27 MMOL/L — SIGNIFICANT CHANGE UP (ref 22–31)
CREAT SERPL-MCNC: 2.87 MG/DL — HIGH (ref 0.5–1.3)
GLUCOSE BLDC GLUCOMTR-MCNC: 233 MG/DL — HIGH (ref 70–99)
GLUCOSE BLDC GLUCOMTR-MCNC: 264 MG/DL — HIGH (ref 70–99)
GLUCOSE BLDC GLUCOMTR-MCNC: 267 MG/DL — HIGH (ref 70–99)
GLUCOSE BLDC GLUCOMTR-MCNC: 352 MG/DL — HIGH (ref 70–99)
GLUCOSE BLDC GLUCOMTR-MCNC: 368 MG/DL — HIGH (ref 70–99)
GLUCOSE SERPL-MCNC: 217 MG/DL — HIGH (ref 70–99)
HCT VFR BLD CALC: 27.8 % — LOW (ref 34.5–45)
HGB BLD-MCNC: 9.2 G/DL — LOW (ref 11.5–15.5)
MCHC RBC-ENTMCNC: 33.2 GM/DL — SIGNIFICANT CHANGE UP (ref 32–36)
MCHC RBC-ENTMCNC: 35.5 PG — HIGH (ref 27–34)
MCV RBC AUTO: 107 FL — HIGH (ref 80–100)
PLATELET # BLD AUTO: 230 K/UL — SIGNIFICANT CHANGE UP (ref 150–400)
POTASSIUM SERPL-MCNC: 4.3 MMOL/L — SIGNIFICANT CHANGE UP (ref 3.5–5.3)
POTASSIUM SERPL-SCNC: 4.3 MMOL/L — SIGNIFICANT CHANGE UP (ref 3.5–5.3)
PROT SERPL-MCNC: 6.4 G/DL — SIGNIFICANT CHANGE UP (ref 6–8.3)
RBC # BLD: 2.6 M/UL — LOW (ref 3.8–5.2)
RBC # FLD: 12.8 % — SIGNIFICANT CHANGE UP (ref 10.3–14.5)
SODIUM SERPL-SCNC: 140 MMOL/L — SIGNIFICANT CHANGE UP (ref 135–145)
WBC # BLD: 7.2 K/UL — SIGNIFICANT CHANGE UP (ref 3.8–10.5)
WBC # FLD AUTO: 7.2 K/UL — SIGNIFICANT CHANGE UP (ref 3.8–10.5)

## 2019-09-17 PROCEDURE — 99233 SBSQ HOSP IP/OBS HIGH 50: CPT

## 2019-09-17 PROCEDURE — 99232 SBSQ HOSP IP/OBS MODERATE 35: CPT

## 2019-09-17 RX ORDER — CLOPIDOGREL BISULFATE 75 MG/1
75 TABLET, FILM COATED ORAL DAILY
Refills: 0 | Status: DISCONTINUED | OUTPATIENT
Start: 2019-09-17 | End: 2019-09-20

## 2019-09-17 RX ORDER — ACETAMINOPHEN 500 MG
650 TABLET ORAL EVERY 6 HOURS
Refills: 0 | Status: DISCONTINUED | OUTPATIENT
Start: 2019-09-17 | End: 2019-09-20

## 2019-09-17 RX ORDER — PANTOPRAZOLE SODIUM 20 MG/1
40 TABLET, DELAYED RELEASE ORAL
Refills: 0 | Status: DISCONTINUED | OUTPATIENT
Start: 2019-09-17 | End: 2019-09-20

## 2019-09-17 RX ORDER — CHLORHEXIDINE GLUCONATE 213 G/1000ML
1 SOLUTION TOPICAL
Refills: 0 | Status: DISCONTINUED | OUTPATIENT
Start: 2019-09-17 | End: 2019-09-20

## 2019-09-17 RX ORDER — ASPIRIN/CALCIUM CARB/MAGNESIUM 324 MG
81 TABLET ORAL DAILY
Refills: 0 | Status: DISCONTINUED | OUTPATIENT
Start: 2019-09-17 | End: 2019-09-20

## 2019-09-17 RX ORDER — INSULIN LISPRO 100/ML
VIAL (ML) SUBCUTANEOUS AT BEDTIME
Refills: 0 | Status: DISCONTINUED | OUTPATIENT
Start: 2019-09-17 | End: 2019-09-17

## 2019-09-17 RX ORDER — INSULIN LISPRO 100/ML
VIAL (ML) SUBCUTANEOUS
Refills: 0 | Status: DISCONTINUED | OUTPATIENT
Start: 2019-09-17 | End: 2019-09-20

## 2019-09-17 RX ORDER — INSULIN LISPRO 100/ML
2 VIAL (ML) SUBCUTANEOUS AT BEDTIME
Refills: 0 | Status: DISCONTINUED | OUTPATIENT
Start: 2019-09-17 | End: 2019-09-20

## 2019-09-17 RX ORDER — INSULIN LISPRO 100/ML
VIAL (ML) SUBCUTANEOUS
Refills: 0 | Status: DISCONTINUED | OUTPATIENT
Start: 2019-09-17 | End: 2019-09-17

## 2019-09-17 RX ORDER — ATORVASTATIN CALCIUM 80 MG/1
20 TABLET, FILM COATED ORAL AT BEDTIME
Refills: 0 | Status: DISCONTINUED | OUTPATIENT
Start: 2019-09-17 | End: 2019-09-20

## 2019-09-17 RX ORDER — METOPROLOL TARTRATE 50 MG
50 TABLET ORAL DAILY
Refills: 0 | Status: DISCONTINUED | OUTPATIENT
Start: 2019-09-17 | End: 2019-09-20

## 2019-09-17 RX ORDER — IPRATROPIUM/ALBUTEROL SULFATE 18-103MCG
3 AEROSOL WITH ADAPTER (GRAM) INHALATION EVERY 6 HOURS
Refills: 0 | Status: DISCONTINUED | OUTPATIENT
Start: 2019-09-17 | End: 2019-09-20

## 2019-09-17 RX ORDER — ISOSORBIDE DINITRATE 5 MG/1
10 TABLET ORAL THREE TIMES A DAY
Refills: 0 | Status: DISCONTINUED | OUTPATIENT
Start: 2019-09-17 | End: 2019-09-20

## 2019-09-17 RX ORDER — INSULIN LISPRO 100/ML
4 VIAL (ML) SUBCUTANEOUS
Refills: 0 | Status: DISCONTINUED | OUTPATIENT
Start: 2019-09-17 | End: 2019-09-17

## 2019-09-17 RX ORDER — OXYCODONE AND ACETAMINOPHEN 5; 325 MG/1; MG/1
1 TABLET ORAL EVERY 6 HOURS
Refills: 0 | Status: DISCONTINUED | OUTPATIENT
Start: 2019-09-17 | End: 2019-09-20

## 2019-09-17 RX ORDER — SODIUM CHLORIDE 9 MG/ML
1000 INJECTION, SOLUTION INTRAVENOUS
Refills: 0 | Status: DISCONTINUED | OUTPATIENT
Start: 2019-09-17 | End: 2019-09-20

## 2019-09-17 RX ORDER — INSULIN GLARGINE 100 [IU]/ML
7 INJECTION, SOLUTION SUBCUTANEOUS AT BEDTIME
Refills: 0 | Status: DISCONTINUED | OUTPATIENT
Start: 2019-09-17 | End: 2019-09-18

## 2019-09-17 RX ORDER — DOCUSATE SODIUM 100 MG
100 CAPSULE ORAL THREE TIMES A DAY
Refills: 0 | Status: DISCONTINUED | OUTPATIENT
Start: 2019-09-17 | End: 2019-09-20

## 2019-09-17 RX ORDER — INSULIN LISPRO 100/ML
VIAL (ML) SUBCUTANEOUS AT BEDTIME
Refills: 0 | Status: DISCONTINUED | OUTPATIENT
Start: 2019-09-17 | End: 2019-09-20

## 2019-09-17 RX ORDER — HYDRALAZINE HCL 50 MG
25 TABLET ORAL THREE TIMES A DAY
Refills: 0 | Status: DISCONTINUED | OUTPATIENT
Start: 2019-09-17 | End: 2019-09-20

## 2019-09-17 RX ORDER — SENNA PLUS 8.6 MG/1
2 TABLET ORAL AT BEDTIME
Refills: 0 | Status: DISCONTINUED | OUTPATIENT
Start: 2019-09-17 | End: 2019-09-20

## 2019-09-17 RX ORDER — INSULIN LISPRO 100/ML
5 VIAL (ML) SUBCUTANEOUS
Refills: 0 | Status: DISCONTINUED | OUTPATIENT
Start: 2019-09-17 | End: 2019-09-18

## 2019-09-17 RX ORDER — INSULIN LISPRO 100/ML
2 VIAL (ML) SUBCUTANEOUS ONCE
Refills: 0 | Status: DISCONTINUED | OUTPATIENT
Start: 2019-09-17 | End: 2019-09-17

## 2019-09-17 RX ORDER — BUDESONIDE, MICRONIZED 100 %
0.5 POWDER (GRAM) MISCELLANEOUS
Refills: 0 | Status: DISCONTINUED | OUTPATIENT
Start: 2019-09-17 | End: 2019-09-20

## 2019-09-17 RX ORDER — HEPARIN SODIUM 5000 [USP'U]/ML
5000 INJECTION INTRAVENOUS; SUBCUTANEOUS EVERY 12 HOURS
Refills: 0 | Status: DISCONTINUED | OUTPATIENT
Start: 2019-09-17 | End: 2019-09-20

## 2019-09-17 RX ADMIN — Medication 3: at 22:47

## 2019-09-17 RX ADMIN — Medication 2: at 12:30

## 2019-09-17 RX ADMIN — PANTOPRAZOLE SODIUM 40 MILLIGRAM(S): 20 TABLET, DELAYED RELEASE ORAL at 08:35

## 2019-09-17 RX ADMIN — Medication 3 MILLILITER(S): at 12:11

## 2019-09-17 RX ADMIN — ISOSORBIDE DINITRATE 10 MILLIGRAM(S): 5 TABLET ORAL at 10:39

## 2019-09-17 RX ADMIN — ISOSORBIDE DINITRATE 10 MILLIGRAM(S): 5 TABLET ORAL at 22:46

## 2019-09-17 RX ADMIN — HEPARIN SODIUM 5000 UNIT(S): 5000 INJECTION INTRAVENOUS; SUBCUTANEOUS at 05:11

## 2019-09-17 RX ADMIN — Medication 0.5 MILLIGRAM(S): at 05:32

## 2019-09-17 RX ADMIN — Medication 4 UNIT(S): at 17:45

## 2019-09-17 RX ADMIN — Medication 50 MILLIGRAM(S): at 04:02

## 2019-09-17 RX ADMIN — Medication 0.5 MILLIGRAM(S): at 17:47

## 2019-09-17 RX ADMIN — CLOPIDOGREL BISULFATE 75 MILLIGRAM(S): 75 TABLET, FILM COATED ORAL at 12:31

## 2019-09-17 RX ADMIN — Medication 3 MILLILITER(S): at 23:09

## 2019-09-17 RX ADMIN — INSULIN GLARGINE 7 UNIT(S): 100 INJECTION, SOLUTION SUBCUTANEOUS at 22:47

## 2019-09-17 RX ADMIN — Medication 25 MILLIGRAM(S): at 05:11

## 2019-09-17 RX ADMIN — Medication 4 UNIT(S): at 12:30

## 2019-09-17 RX ADMIN — ISOSORBIDE DINITRATE 10 MILLIGRAM(S): 5 TABLET ORAL at 15:46

## 2019-09-17 RX ADMIN — Medication 25 MILLIGRAM(S): at 22:47

## 2019-09-17 RX ADMIN — Medication 25 MILLIGRAM(S): at 15:47

## 2019-09-17 RX ADMIN — Medication 4 UNIT(S): at 08:35

## 2019-09-17 RX ADMIN — Medication 100 MILLIGRAM(S): at 05:11

## 2019-09-17 RX ADMIN — Medication 5: at 08:35

## 2019-09-17 RX ADMIN — Medication 3: at 17:45

## 2019-09-17 RX ADMIN — ATORVASTATIN CALCIUM 20 MILLIGRAM(S): 80 TABLET, FILM COATED ORAL at 22:47

## 2019-09-17 RX ADMIN — Medication 3 MILLILITER(S): at 17:47

## 2019-09-17 RX ADMIN — Medication 81 MILLIGRAM(S): at 12:31

## 2019-09-17 RX ADMIN — Medication 3 MILLILITER(S): at 05:31

## 2019-09-17 NOTE — PROGRESS NOTE ADULT - ASSESSMENT
65 y/o F w/ PMHx ESRD (HD M/T/T/Sat), IDDM, CHF, CAD, ischemic cardiomyopathy presents to the ED with upper respiratory illness and DKA.     #Neuro  -A&Ox4    #Endo  - BMP daily  - AG closed now  - lantus 7 humalog 4    #Resp  -duonebs and pulmicort     #CV  - hx of ischemic CMP  - c/w plavix 75mg oral daily  - lipitor 20 oral daily  - hydralazine 25mg TID, isosorbide dinitrate 10mg TID, metoprolol 50 oral daily - held for dialysis, will restart accordingly.     #GI  - protonix 40mg before breakfast  - on a diabetic + renal diet    #ID  - s/p 1 dose of vanc and zosyn  - RVP positive for rhinovirus  - no need for abx right now  - f/u UA and f/u Blood cultures (thus far negative)    #Renal  - will get dialysis 9/16   - will f/u renal recs    #Heme  - hemoglobin stable, will c/w to monitor    #DVT PPx  - heparin 5000 TID

## 2019-09-17 NOTE — DIETITIAN INITIAL EVALUATION ADULT. - FACTORS AFF FOOD INTAKE
Pt reports she recently misplaced her dentures and will not be able to receive another pair until January. Reports tolerating current diet order Soft. Denies nausea/vomiting, diarrhea/constipation, or other acute GI distress at this time. Last BM 9/15./difficulty chewing

## 2019-09-17 NOTE — PROGRESS NOTE ADULT - ATTENDING COMMENTS
63 y/o F hx ESRD, DM, CHF, CAD p/w DKA in setting of viral infection. Pt currently feels improvement but reports cont cough/sputum. No SOB.  Tolerating po diet. Never on insulin gtt but AG stable. FS variable but remains improved from admission with lantus and premeal regimen. No further hypotension today. BP meds can be reintroduced if bp stable. Cont HD as per renal.
65 y/o F hx ESRD, DM, CHF, CAD p/w DKA in setting of viral infection. Pt currently feels improvement but reports cont cough/sputum. No SOB.  This AM tolerating po diet. Not on insulin gtt but AG and FS improving with lantus and premeal regimen. Had episode of hypotension that responded to fluids. BP meds held. Plan for HD today.

## 2019-09-17 NOTE — DIETITIAN INITIAL EVALUATION ADULT. - PHYSICAL APPEARANCE
well nourished/other (specify) Pt visually appears well developed with no overt signs of muscle wasting or fat loss. Per documentation, no edema and skin free of pressure injuries at this time.    Ht: 64 inches, Wt: 126.1 pounds (post-HD weight, 9/16), BMI: 21.6 kg/m2; IBW: 120 pounds (+/-10%), %IBW: 105%

## 2019-09-17 NOTE — PROGRESS NOTE ADULT - ASSESSMENT
64 f with    COPD exacerbation/ Viral syndrome  - nebs  - pulmonary follow Dr Tate  - ID evaluation noted    ESRD  - HD  - Nephrology follow    Hyperkalemia  - follow  -nephrology follow    IDDM type 1/ DKA  - BS control  - Endocrine follow  - ICU care    CAD  - continue Rx    Acute on Chronic Systolic Congestive Heart Failure  - HD  - Cardiology follow    Anxiety/ Depression  - stable    d/w patient and   Pierce Viera MD pager [phone]

## 2019-09-17 NOTE — PROGRESS NOTE ADULT - SUBJECTIVE AND OBJECTIVE BOX
Cardiovascular Disease Progress Note    Overnight events: No acute events overnight.  remains in icu. no cp/sob/palps/dizziness  Otherwise review of systems negative    Objective Findings:  T(C): 36.8 (19 @ 04:00), Max: 36.8 (19 @ 08:00)  HR: 123 (19 @ 06:00) (81 - 124)  BP: 126/69 (19 @ 06:00) (78/51 - 140/81)  RR: 31 (19 @ 06:00) (11 - 32)  SpO2: 97% (19 @ 06:00) (91% - 100%)  Wt(kg): --  Daily     Daily Weight in k.2 (16 Sep 2019 19:50)      Physical Exam:  Gen: NAD  HEENT: EOMI  CV: RRR, normal S1 + S2, no m/r/g  Lungs: CTAB  Abd: soft, non-tender  Ext: No edema    Telemetry: st    Laboratory Data:                        9.2    7.2   )-----------( 230      ( 17 Sep 2019 00:43 )             27.8         140  |  96  |  15  ----------------------------<  217<H>  4.3   |  27  |  2.87<H>    Ca    9.0      17 Sep 2019 00:43  Phos  6.1     -  Mg     2.1     -    TPro  6.4  /  Alb  3.6  /  TBili  0.3  /  DBili  x   /  AST  24  /  ALT  30  /  AlkPhos  103  09-17      CARDIAC MARKERS ( 15 Sep 2019 11:05 )  x     / x     / 240 U/L / x     / 5.9 ng/mL          Inpatient Medications:  MEDICATIONS  (STANDING):  ALBUTerol/ipratropium for Nebulization 3 milliLiter(s) Nebulizer every 6 hours  aspirin enteric coated 81 milliGRAM(s) Oral daily  atorvastatin 20 milliGRAM(s) Oral at bedtime  buDESOnide    Inhalation Suspension 0.5 milliGRAM(s) Inhalation two times a day  chlorhexidine 4% Liquid 1 Application(s) Topical <User Schedule>  clopidogrel Tablet 75 milliGRAM(s) Oral daily  dextrose 5%. 1000 milliLiter(s) (50 mL/Hr) IV Continuous <Continuous>  docusate sodium 100 milliGRAM(s) Oral three times a day  heparin  Injectable 5000 Unit(s) SubCutaneous every 12 hours  hydrALAZINE 25 milliGRAM(s) Oral three times a day  insulin glargine Injectable (LANTUS) 7 Unit(s) SubCutaneous at bedtime  insulin lispro (HumaLOG) corrective regimen sliding scale   SubCutaneous three times a day before meals  insulin lispro (HumaLOG) corrective regimen sliding scale   SubCutaneous at bedtime  insulin lispro Injectable (HumaLOG) 4 Unit(s) SubCutaneous three times a day with meals  isosorbide   dinitrate Tablet (ISORDIL) 10 milliGRAM(s) Oral three times a day  metoprolol succinate ER 50 milliGRAM(s) Oral daily  pantoprazole    Tablet 40 milliGRAM(s) Oral before breakfast      Assessment:  -DKA  -upper respiratory infection; RVP +  -elevated cardiac enzymes (hs trop) with relatively preserved ck/ck mb fraction and no obvious ischemic changes on ekg  -chest pain with mild ekg changes and stable hs trop  -NSVT  -pAT  -volume overload  -ischemic cardiomyopathy, cad s/p multiple stents  -esrd on hd  -PAD s/p prior intervention         Recs:  -c/w supportive care for viral URI  -known extensive CAD and ICM. s/p [facility] 2019 --> severe and diffuse instent restenosis along RCA --> unable to stent due to multiple layers of stenting and prior brachytherapy  -will consider complex intervention if true ischemic and refractory sx develop   -c/w beta blockers for nsvt/pat/cad (as above). currently on toprol 50mg, cont nitrates as tolerates (isordil 10mg tid, hydral 25mg tid)  -hx of prolonged qtc. avoid qtc prolonging meds  -s/p TTE 2019: mod-severe MR, pseudo AS (low flow-low gradient), mod-severe LV dysfunction --> recent CTS consult with dr hebert appreciated. plan is for medical management given surgical risk and patient preference  -c/w asa, plavix, statin for ischemic cardiomyopathy/CAD/PAD  -dvt ppx        Over 25 minutes spent on total encounter; more than 50% of the visit was spent counseling and/or coordinating care by the attending physician.      Julián Biswas MD   Cardiovascular Disease  [phone]

## 2019-09-17 NOTE — CONSULT NOTE ADULT - SUBJECTIVE AND OBJECTIVE BOX
PULMONARY CONSULT NOTE      NATHAN MEEKS  MRN-[id number]    Patient is a 64y old  Female who presents with a chief complaint of sob (17 Sep 2019 07:33)      HISTORY OF PRESENT ILLNESS:  84 yo female with PMH significant for CAD (Cath Feb '19 showing 99% RCA, 100% RPLS, 100% Cx) s/p multiple stents/brachytherapy, ESRD (on HD M/T/T/Sa), DM1, CHF (EF 30% per last [facility]), mod MR, severe AS,  TIA, severe PVD s/p b/l fempop bypass, left subclavian vein stenosis s/p stent, hx E bus and mediastinoscopy cardiothoracic surgery to rule out lymphoma, rule out lung cancer with non diagnostic, COPD not on medications, admitted for cough/dyspnea. She was last seen by Dr Thompson 8/2019, plan to f/u CT chest to f/u the lymphadenopathy in 3 months  Admitted to ICU for DKA  Seen this morning in the ICU; on 1L NC- no complaints     Allergies    No Known Allergies    IntolerancesD        PAST MEDICAL & SURGICAL HISTORY:  COPD (chronic obstructive pulmonary disease)  Localized enlarged lymph nodes  CHF (congestive heart failure): EF 40-45%  Subclavian vein stenosis, left: s/p stent  DKA, type 1: 1/2015  ACS (acute coronary syndrome): 1/2015 - cath revealed 100% ostial stenosis not amenable to PCI - medical management  TIA (transient ischemic attack): x 2 - 8-9 years ago prior to ASD/VSD repair  CAD (coronary artery disease): s/p stents  Gout: past  CVA (cerebral infarction): with no residual, 8 yrs ago, prior to heart surgery - ST memory loss  Peripheral vascular disease: occluded left fem-pop bypass 5/2015  Diabetes mellitus type 1: Insulin Dependent -  ESRD (end stage renal disease): dialysis  M, tue, thursday, saturday  Hyperlipidemia  Status post device closure of ASD: &quot;clamshell&quot;  History of cardiac catheterization: 1/2015 - no intervention  S/P femoral-popliteal bypass surgery: L and R in 2013 with graft; 5/2015 CFA angioplasty left and ileofemoral endarterectomywith vein patch angioplasty of left fem-pop bypass graft  Multiple vascular surgery both leg, left fempop bypass revision 11/2015  AV (arteriovenous fistula): Left AV graft; revision with stent placement 2-3 years ago  S/P cholecystectomy          FAMILY HISTORY:  Family history of smoking  Family history of hypertension  Family history of cancer (Sibling)    Prescriptions:  isosorbide dinitrate 10 mg oral tablet: 1 tab(s) orally 3 times a day (08 Aug 2019 10:16)  metoprolol succinate 50 mg oral tablet, extended release: 1 tab(s) orally every 12 hours (08 Aug 2019 10:16)      SOCIAL HISTORY  Smoking History: quit in 2001    REVIEW OF SYSTEMS:    CONSTITUTIONAL:  No fevers, chills, sweats    HEENT:  Eyes:  No diplopia or blurred vision. ENT:  No earache, sore throat or runny nose.    CARDIOVASCULAR:  No pressure, squeezing, tightness, or heaviness about the chest; no palpitations.    RESPIRATORY:  Per HPI    GASTROINTESTINAL:  No abdominal pain, nausea, vomiting or diarrhea.    GENITOURINARY:  No dysuria, frequency or urgency.    NEUROLOGIC:  No paresthesias, fasciculations, seizures or weakness.    PSYCHIATRIC:  No disorder of thought or mood.    Vital Signs Last 24 Hrs  T(C): 37 (17 Sep 2019 12:00), Max: 37.2 (17 Sep 2019 08:00)  T(F): 98.6 (17 Sep 2019 12:00), Max: 98.9 (17 Sep 2019 08:00)  HR: 93 (17 Sep 2019 13:00) (81 - 124)  BP: 134/73 (17 Sep 2019 13:00) (78/51 - 155/77)  BP(mean): 97 (17 Sep 2019 13:00) (59 - 109)  RR: 22 (17 Sep 2019 13:00) (20 - 31)  SpO2: 96% (17 Sep 2019 13:00) (91% - 100%)    PHYSICAL EXAMINATION:    GENERAL: The patient is an elderly female in no apparent distress.     HEENT: Head is normocephalic and atraumatic. Extraocular muscles are intact. Mucous membranes are moist.     NECK: Supple.     LUNGS: Clear to auscultation without wheezing, rales, or rhonchi. Respirations unlabored    HEART: Regular rate and rhythm without murmur.    ABDOMEN: Soft, nontender, and nondistended.  No hepatosplenomegaly is noted.    EXTREMITIES: Without any cyanosis, clubbing, rash, lesions or edema.    NEUROLOGIC: Grossly intact      MEDICATIONS  (STANDING):  ALBUTerol/ipratropium for Nebulization. 3 milliLiter(s) Nebulizer every 6 hours  aspirin enteric coated 81 milliGRAM(s) Oral daily  atorvastatin 20 milliGRAM(s) Oral at bedtime  buDESOnide    Inhalation Suspension 0.5 milliGRAM(s) Inhalation two times a day  chlorhexidine 4% Liquid 1 Application(s) Topical <User Schedule>  clopidogrel Tablet 75 milliGRAM(s) Oral daily  dextrose 5%. 1000 milliLiter(s) (50 mL/Hr) IV Continuous <Continuous>  docusate sodium 100 milliGRAM(s) Oral three times a day  heparin  Injectable 5000 Unit(s) SubCutaneous every 12 hours  hydrALAZINE 25 milliGRAM(s) Oral three times a day  insulin glargine Injectable (LANTUS) 7 Unit(s) SubCutaneous at bedtime  insulin lispro (HumaLOG) corrective regimen sliding scale   SubCutaneous three times a day before meals  insulin lispro (HumaLOG) corrective regimen sliding scale   SubCutaneous <User Schedule>  insulin lispro (HumaLOG) corrective regimen sliding scale   SubCutaneous at bedtime  insulin lispro Injectable (HumaLOG) 4 Unit(s) SubCutaneous three times a day with meals  insulin lispro Injectable (HumaLOG). 2 Unit(s) SubCutaneous once  isosorbide   dinitrate Tablet (ISORDIL) 10 milliGRAM(s) Oral three times a day  metoprolol succinate ER 50 milliGRAM(s) Oral daily  pantoprazole    Tablet 40 milliGRAM(s) Oral before breakfast      MEDICATIONS  (PRN):  acetaminophen   Tablet .. 650 milliGRAM(s) Oral every 6 hours PRN Temp greater or equal to 38.5C (101.3F), Mild Pain (1 - 3)  oxyCODONE    5 mG/acetaminophen 325 mG 1 Tablet(s) Oral every 6 hours PRN Moderate Pain (4 - 6)  senna 2 Tablet(s) Oral at bedtime PRN Constipation        LABS:   CBC Full  -  ( 17 Sep 2019 00:43 )  WBC Count : 7.2 K/uL  RBC Count : 2.60 M/uL  Hemoglobin : 9.2 g/dL  Hematocrit : 27.8 %  Platelet Count - Automated : 230 K/uL  Mean Cell Volume : 107.0 fl  Mean Cell Hemoglobin : 35.5 pg  Mean Cell Hemoglobin Concentration : 33.2 gm/dL  Auto Neutrophil # : x  Auto Lymphocyte # : x  Auto Monocyte # : x  Auto Eosinophil # : x  Auto Basophil # : x  Auto Neutrophil % : x  Auto Lymphocyte % : x  Auto Monocyte % : x  Auto Eosinophil % : x  Auto Basophil % : x      09-17    140  |  96  |  15  ----------------------------<  217<H>  4.3   |  27  |  2.87<H>    Ca    9.0      17 Sep 2019 00:43  Phos  6.1     09-16  Mg     2.1     09-16    TPro  6.4  /  Alb  3.6  /  TBili  0.3  /  DBili  x   /  AST  24  /  ALT  30  /  AlkPhos  103  09-17                           Culture - Blood (collected 15 Sep 2019 09:40)  Source: .Blood  Preliminary Report (16 Sep 2019 10:00):    No growth to date.    Culture - Blood (collected 15 Sep 2019 04:36)  Source: .Blood  Preliminary Report (16 Sep 2019 05:00):    No growth to date.        RADIOLOGY & ADDITIONAL STUDIES:< from: Xray Chest 1 View-PORTABLE IMMEDIATE (09.14.19 @ 23:33) >    EXAM:  XR CHEST PORTABLE IMMED 1V                            PROCEDURE DATE:  09/14/2019            INTERPRETATION:  CLINICAL INFORMATION: Sepsis, altered mental status.    TECHNIQUE: Single AP view of the chest 9/14/2019 11:33 PM.    COMPARISON: Chest radiograph 7/29/2019.    FINDINGS:  Left subclavian vascular stent. Coronary artery stent.  Left basilar linear opacity is similar to the prior study. Lungs   otherwise clear.  No pleural effusions. No pneumothorax.  Cardiac size cannot accurately be assessed in this projection.     IMPRESSION: Left basilar linear opacity is similar to the prior study.   Lungs otherwise clear.                GILLIAN ESPINOZA M.D., RADIOLOGY RESIDENT  This document has been electronically signed.  JHONATHAN WHITFIELD M.D., ATTENDING RADIOLOGIST  This document has been electronically signed. Sep 15 2019 10:03AM                < end of copied text >      ECHO:  < from: Transthoracic Echocardiogram (04.02.19 @ 06:25) >  Conclusions:  1. Mitral annular calcification. Tethered mitral valve  leaflets with normal opening. Moderate-severe mitral  regurgitation.  2. Calcified trileaflet aortic valve with decreased  opening. Peak transaortic valve gradient equals 14 mm Hg,  mean transaortic valve gradient equals 7 mm Hg, estimated  aortic valve area equals 0.9 sqcm (by continuity equation),  aortic valve velocity time integral equals 42 cm,  consistent with severe aortic stenosis. Minimal aortic  regurgitation.  3. Mildly dilated left atrium.  LA volume index = 40 cc/m2.  An atrial septal closure device is seen on the interatrial  septum and appears well-seated with no residual interatrial  flow by color Doppler.  4. Mild left ventricular enlargement.  5. Moderate to severe segmental left ventricular systolic  dysfunction. The inferior and inferolateral walls are  akinetic. The inferoseptal and lateral walls are  hypokinetic. There is a false tendon in the LV cavity  (normal variant).  6. Normal right ventricular size with mildly decreased  right ventricular systolic function.  *** Compared with echocardiogram of 10/14/2018, LV systolic  function has decreased. Calculated CHRIS is smaller on  today's study, likely due to decreased cardiac output.  ------------------------------------------------------------------------  Confirmed on  4/2/2019 - 09:34:12 by Nishi Mcclendon M.D.  ------------------------------------------------------------------------    < end of copied text >    ASSESSMENT:    84 yo female with multiple comorbidities admitted for cough, +RVP, initially in ICU for DKA now on GMF    PLAN:  appreciate ICU care  supportive care for enterovirus  nebs q6hrs PRN  IS  02 as needed     Thank you for allowing me to participate in the care of this patient.  Please feel free to call me for any questions/concerns.      Chelle Kapoor DO  [facility] Pulmonary/Sleep Medicine  [phone]

## 2019-09-17 NOTE — PROGRESS NOTE ADULT - ASSESSMENT
Counseling about drug reaction rash discussed with mom at length  Patient Education     Allergic Reaction to a Drug (Child)  Some children are very sensitive to certain medications. Exposure to these drugs stimulates the body to release chemical substances. One substance, histamine, causes swelling and itching. This condition is called a drug-induced allergic reaction. Your child is having such an allergic reaction to a drug he or she took.  Symptoms may occur within minutes, hours, or even weeks after exposure to the drug. It can be a mild or severe reaction, or potentially life threatening. Most of us think of allergic reactions when we have a rash or itchy skin. Common symptoms can include:  · Rash, hives, redness, welts, blisters  · Itching, burning, stinging, pain  · Dry, flaky, cracking, scaly skin  · Swelling of the face, lips or other parts of the body  · In a small number of cases, a fever may be the only symptom   More severe symptoms include:  · Trouble swallowing, feeling like your the throat is closing  · Trouble breathing, wheezing  · Hoarse voice or trouble speaking  · Nausea, vomiting, diarrhea, stomach cramps  · Feeling faint or lightheaded, rapid heart rate  Any medicine can cause an allergic reaction. However, penicillin and related drugs, sulfa drugs, aspirin, ibuprofen, and seizure medicines cause the most allergic reactions. Vaccines may also trigger allergies. Children whose parents or siblings have allergies are at a higher risk of developing a drug allergy.  Allergy testing may be required to determine the cause.   Home care  The goal of our treatment is to help relieve the symptoms, and get your child feeling better. Mild to moderate symptoms usually respond quickly to antihistamines and steroids. Severe reactions may require a stay in the hospital. The rash will usually fade over several days, but can sometimes last a couple of weeks. Over the next couple of days, there may be times when  65 y/o F w/ PMHx ESRD (HD M/T/T/Sat), IDDM, CHF, CAD, ischemic cardiomyopathy presents to the ED BIBA for fever. pt also in DKA, hyperglycemia and now with hyperkalemia as well as DKA    1- esrd  2- hyperkalemia resolved   3- DKA on admission   4- HTN   5- chf      hd am   cont with hydralazine 25 mg tid   add renvela one tab with meals  lantus 7 u qhs and insulin s-s it is gets a little worse, and then better again. Here are some things to do:  Medicine  The healthcare provider may prescribe medicines to relieve swelling, itching, and possibly pain. Follow your healthcare provider's instructions when giving this medicine to your child.  · If your child had a severe reaction, for your child's future safety, the healthcare provider may prescribe an injectable epinephrine kit. Epinephrine will stop the progression of an allergic reaction. Before you leave the hospital, be sure that you understand when and how to use this medicine.  · Oral Benadryl (diphenhydramine) is an antihistamine available at drug and grocery stores. Unless a prescription antihistamine was given, Benadryl may be used to reduce itching if large areas of the skin are involved. Before giving your child any antihistamine, be certain to check with your healthcare provider for instructions.  · Do not use Benadryl cream on your skin, because in some people it can cause a further reaction, and make you allergic to Benadryl.  · Calamine lotion or oatmeal baths sometimes help with itching  General care  1. Work with your healthcare provider to identify and avoid the problem drug and related drugs. Future reactions may be worse.  2. Document the drug reaction in your child's electronic medical record.  3. When getting a new medicine, always tell the healthcare provider that your child is allergic to this drug. Make certain the provider writes it in your child's record.  4. Have your child wear a medical alert bracelet or necklace that identifies the drug allergy.  5. Keep a record of symptoms, when they occurred, and problem drugs. This will help your doctor determine future care for your child.  6. Instruct all care providers and school officials about the drug allergy and how to use any prescribed medication. Make certain it is documented in appropriate locations (such as the child's medical records, with the school  nurse, in a confidential classroom location, etc.)  7. Try to prevent your child from scratching any affected area, as it can cause an infection.  8. If the doctor prescribes an injectable epinephrine kit, keep it with your child at all times. Make sure you know how to use it before leaving the hospital.  Follow-up care  Follow up with your healthcare provider, or as advised.  Call 911  Call 911 if any of these occur:  · Trouble breathing or blue color to the skin  · Difficulty swallowing, wheezing  · Hoarse voice or trouble speaking  · Confusion or impaired speech or movement  · Very drowsy or trouble speaking  · Fainting or loss of consciousness  · Rash that develops very quickly  · Rapid heart rate  · Vomiting blood, or large amounts of blood in stool  · Seizure  · Swelling of the face, lips, or tongue or drooling  · Condition gets worse quickly  · You are frightened and unsure about your child's well-being  When to seek medical advice  Call your healthcare provider right away if any of the following occur:  · Trouble breathing or swallowing, wheezing, hives, face or lip swelling, drooling, vomiting, or explosive diarrhea (Call 911)  · Fever greater than 100.4°F (38°C)  · Continuing or recurring symptoms  © 7926-5013 [employer]. [address] [place] [address], [place] [address]. All rights reserved. This information is not intended as a substitute for professional medical care. Always follow your healthcare professional's instructions.

## 2019-09-17 NOTE — DIETITIAN INITIAL EVALUATION ADULT. - ADD RECOMMEND
Continue current diet order of Soft, Consistent Carbohydrate, DASH/TLC. Monitor electrolytes and need for Renal diet restriction. Monitor pt acceptance to oral nutrition supplements to meet increased protein needs. Reinforce diet education PRN. Monitor tolerance to diet prescription, nutritional intake, weight trends, labs and skin integrity.

## 2019-09-17 NOTE — PROGRESS NOTE ADULT - SUBJECTIVE AND OBJECTIVE BOX
[facility]   [phone]  RENAL FOLLOW UP NOTE  --------------------------------------------------------------------------------  Chief Complaint:    24 hour events/subjective:    seen earlier. still with some c/o weakness overall     PAST HISTORY  --------------------------------------------------------------------------------  No significant changes to PMH, PSH, FHx, SHx, unless otherwise noted    ALLERGIES & MEDICATIONS  --------------------------------------------------------------------------------  Allergies    No Known Allergies    Intolerances      Standing Inpatient Medications  ALBUTerol/ipratropium for Nebulization. 3 milliLiter(s) Nebulizer every 6 hours  aspirin enteric coated 81 milliGRAM(s) Oral daily  atorvastatin 20 milliGRAM(s) Oral at bedtime  buDESOnide    Inhalation Suspension 0.5 milliGRAM(s) Inhalation two times a day  chlorhexidine 4% Liquid 1 Application(s) Topical <User Schedule>  clopidogrel Tablet 75 milliGRAM(s) Oral daily  dextrose 5%. 1000 milliLiter(s) IV Continuous <Continuous>  docusate sodium 100 milliGRAM(s) Oral three times a day  heparin  Injectable 5000 Unit(s) SubCutaneous every 12 hours  hydrALAZINE 25 milliGRAM(s) Oral three times a day  insulin glargine Injectable (LANTUS) 7 Unit(s) SubCutaneous at bedtime  insulin lispro (HumaLOG) corrective regimen sliding scale   SubCutaneous three times a day before meals  insulin lispro (HumaLOG) corrective regimen sliding scale   SubCutaneous at bedtime  insulin lispro (HumaLOG) corrective regimen sliding scale   SubCutaneous <User Schedule>  insulin lispro Injectable (HumaLOG) 2 Unit(s) SubCutaneous at bedtime  insulin lispro Injectable (HumaLOG) 5 Unit(s) SubCutaneous three times a day with meals  isosorbide   dinitrate Tablet (ISORDIL) 10 milliGRAM(s) Oral three times a day  metoprolol succinate ER 50 milliGRAM(s) Oral daily  pantoprazole    Tablet 40 milliGRAM(s) Oral before breakfast    PRN Inpatient Medications  acetaminophen   Tablet .. 650 milliGRAM(s) Oral every 6 hours PRN  oxyCODONE    5 mG/acetaminophen 325 mG 1 Tablet(s) Oral every 6 hours PRN  senna 2 Tablet(s) Oral at bedtime PRN      REVIEW OF SYSTEMS  --------------------------------------------------------------------------------    Gen: denies fevers/chills +   CVS: denies chest pain/palpitations  Resp: denies worsening  SOB/Cough +   GI: Denies N/V/Abd pain  : Denies dysuria    All other systems were reviewed and are negative, except as noted.    VITALS/PHYSICAL EXAM  --------------------------------------------------------------------------------  T(C): 37.3 (09-17-19 @ 19:50), Max: 37.3 (09-17-19 @ 19:50)  HR: 95 (09-17-19 @ 19:50) (86 - 124)  BP: 112/65 (09-17-19 @ 19:50) (108/55 - 155/77)  RR: 18 (09-17-19 @ 19:50) (18 - 31)  SpO2: 95% (09-17-19 @ 19:50) (93% - 100%)  Wt(kg): --        09-16-19 @ 07:01  -  09-17-19 @ 07:00  --------------------------------------------------------  IN: 1480 mL / OUT: 1500 mL / NET: -20 mL    09-17-19 @ 07:01  -  09-17-19 @ 21:35  --------------------------------------------------------  IN: 480 mL / OUT: 0 mL / NET: 480 mL      Physical Exam:  	  Gen: ill appearing coughing   	+ JVD  	Pulm: decrease bs no rales or wheezing   	CV: RRR, S1S2; no rub  	Abd: +BS, soft, nontender/nondistended  	: No suprapubic tenderness  	UE: Warm, no cyanosis  no clubbing,  no edema  	LE: Warm, no cyanosis  no clubbing, 2+ pitting LLE > RLE  edema  	Neuro: still  lethargic   	Vascular access: + avf + bruit and thrill         LABS/STUDIES  --------------------------------------------------------------------------------              9.2    7.2   >-----------<  230      [09-17-19 @ 00:43]              27.8     140  |  96  |  15  ----------------------------<  217      [09-17-19 @ 00:43]  4.3   |  27  |  2.87        Ca     9.0     [09-17-19 @ 00:43]      Mg     2.1     [09-16-19 @ 05:52]      Phos  6.1     [09-16-19 @ 05:52]    TPro  6.4  /  Alb  3.6  /  TBili  0.3  /  DBili  x   /  AST  24  /  ALT  30  /  AlkPhos  103  [09-17-19 @ 00:43]          Creatinine Trend:  SCr 2.87 [09-17 @ 00:43]  SCr 3.91 [09-16 @ 05:52]  SCr 3.51 [09-16 @ 02:21]  SCr 3.21 [09-15 @ 22:15]  SCr 2.70 [09-15 @ 17:23]                  Iron 42, TIBC 253, %sat 17      [06-17-19 @ 17:54]  Ferritin 1838      [06-17-19 @ 18:06]  PTH -- (Ca 9.6)      [06-17-19 @ 18:06]   177  PTH -- (Ca 7.8)      [03-29-19 @ 20:38]   73  PTH -- (Ca 7.3)      [02-22-19 @ 02:49]   59  HbA1c 8.1      [09-16-19 @ 08:04]  TSH 0.71      [11-17-18 @ 08:25]

## 2019-09-17 NOTE — DIETITIAN INITIAL EVALUATION ADULT. - OTHER INFO
DIET HISTORY PTA: Pt reports excellent appetite and intake PTA. Pt did not wish to discuss typical intake, however admits that she avoids concentrated sweets, is aware of potassium/phosphorus/sodium restriction and increased protein needs while on HD. Reports she snacks often during the day and almost always in the middle of the night. Endorses that she takes Lantus and Humalog PTA; reports checking finger sticks >6x/day. States she does not count carbohydrates, however will adjust insulin based on blood sugar at that time. Recent HgbA1c of 8.1% (9/16) suggests poor glycemic maintenance PTA. Per previous RD note, "pt's family manages her DM at home since she is forgetful and has trouble adherent to DM care". NFKA. Denies PTA micronutrient supplementation.     WEIGHT HISTORY: Pt unable to report UBW, however upon inquiry admits weight gain which she attributes to increased appetite and intake (not fluid retention). Per review of past RD notes, weights as follows: 116.8 pounds (3/1/18), 118.6 pounds (12/19/18), 121.2 pounds (1/24/19), 118 pounds (2/20/19), 104.4 pounds (4/1/19). Pt admitted with fluid retention at 135.3 pounds (7/31), 110.8 pounds (8/7). Current weight noted as 126 pounds (9/15), suggesting 15 pound weight gain. However, question accuracy of weights as pt noted to be on HD in-house and 4 times weekly PTA; thus weight changes may be related to fluid shifts. Per chart, last HD 9/16; 1500 mL removed.    INTERVENTION: Pt declined extensive review nutrition education at this time. RD able to briefly reviewed importance of monitoring carbohydrate portions, idea of consistent carbohydrate intake, limiting snacking throughout the night to promote euglycemia, and increased protein needs. Pt declined written material at this time. Pt declined oral nutrition supplements at this time to meet increased protein needs.

## 2019-09-17 NOTE — PROGRESS NOTE ADULT - SUBJECTIVE AND OBJECTIVE BOX
CHIEF COMPLAINT:  65 y/o F w/ PMHx ESRD (HD M/T/T/Sat), IDDM, CHF, CAD, ischemic cardiomyopathy presents to the ED BIBA for fever and AMS. She also reports a productive cough w/ yellow sputum for "days". She otherwise had her full course of dialysis today and reports chills at that time.    Interval Events:  last night, received HD with removal of 1500cc of fluid. Patient also had some hypotension and received some fluids as well. This AM, patient is resting comfortable in bed and saturating well on 2L NC. Blood sugars have been well controlled <300. as per endocrinology, lantus should be kept at 7 for now and humalog at 4 for now. AG has been closed.     REVIEW OF SYSTEMS:  Constitutional: [x] negative [ ] fevers [ ] chills [ ] weight loss [ ] weight gain  HEENT: [x] negative [ ] dry eyes [ ] eye irritation [ ] postnasal drip [ ] nasal congestion  CV: [x] negative  [ ] chest pain [ ] orthopnea [ ] palpitations [ ] murmur  Resp: [ ] negative [ ] cough [ ] shortness of breath [ ] dyspnea [ ] wheezing [ ] sputum [ ] hemoptysis  GI: [ ] negative [ ] nausea [ ] vomiting [ ] diarrhea [ ] constipation [ ] abd pain [ ] dysphagia   : [ ] negative [ ] dysuria [ ] nocturia [ ] hematuria [ ] increased urinary frequency  Musculoskeletal: [ ] negative [ ] back pain [ ] myalgias [ ] arthralgias [ ] fracture  Skin: [ ] negative [ ] rash [ ] itch  Neurological: [ ] negative [ ] headache [ ] dizziness [ ] syncope [ ] weakness [ ] numbness  Psychiatric: [ ] negative [ ] anxiety [ ] depression  Endocrine: [ ] negative [ ] diabetes [ ] thyroid problem  Hematologic/Lymphatic: [ ] negative [ ] anemia [ ] bleeding problem  Allergic/Immunologic: [ ] negative [ ] itchy eyes [ ] nasal discharge [ ] hives [ ] angioedema  [ ] All other systems negative  [ ] Unable to assess ROS because ________    OBJECTIVE:  ICU Vital Signs Last 24 Hrs  T(C): 36.8 (17 Sep 2019 04:00), Max: 36.8 (16 Sep 2019 08:00)  T(F): 98.2 (17 Sep 2019 04:00), Max: 98.3 (16 Sep 2019 08:00)  HR: 123 (17 Sep 2019 07:00) (81 - 124)  BP: 133/72 (17 Sep 2019 07:00) (78/51 - 140/81)  BP(mean): 97 (17 Sep 2019 07:00) (59 - 105)  ABP: --  ABP(mean): --  RR: 28 (17 Sep 2019 07:00) (11 - 32)  SpO2: 94% (17 Sep 2019 07:00) (91% - 100%)        09-16 @ 07:01  -  09-17 @ 07:00  --------------------------------------------------------  IN: 1480 mL / OUT: 1500 mL / NET: -20 mL      CAPILLARY BLOOD GLUCOSE      POCT Blood Glucose.: 264 mg/dL (17 Sep 2019 04:00)      PHYSICAL EXAM:  General:   HEENT:   Lymph Nodes:  Neck:   Respiratory:   Cardiovascular:   Abdomen:   Extremities:   Skin:   Neurological:  Psychiatry:    LINES:    HOSPITAL MEDICATIONS:  Standing Meds:  ALBUTerol/ipratropium for Nebulization 3 milliLiter(s) Nebulizer every 6 hours  aspirin enteric coated 81 milliGRAM(s) Oral daily  atorvastatin 20 milliGRAM(s) Oral at bedtime  buDESOnide    Inhalation Suspension 0.5 milliGRAM(s) Inhalation two times a day  chlorhexidine 4% Liquid 1 Application(s) Topical <User Schedule>  clopidogrel Tablet 75 milliGRAM(s) Oral daily  dextrose 5%. 1000 milliLiter(s) IV Continuous <Continuous>  docusate sodium 100 milliGRAM(s) Oral three times a day  heparin  Injectable 5000 Unit(s) SubCutaneous every 12 hours  hydrALAZINE 25 milliGRAM(s) Oral three times a day  insulin glargine Injectable (LANTUS) 7 Unit(s) SubCutaneous at bedtime  insulin lispro (HumaLOG) corrective regimen sliding scale   SubCutaneous three times a day before meals  insulin lispro (HumaLOG) corrective regimen sliding scale   SubCutaneous at bedtime  insulin lispro Injectable (HumaLOG) 4 Unit(s) SubCutaneous three times a day with meals  isosorbide   dinitrate Tablet (ISORDIL) 10 milliGRAM(s) Oral three times a day  metoprolol succinate ER 50 milliGRAM(s) Oral daily  pantoprazole    Tablet 40 milliGRAM(s) Oral before breakfast      PRN Meds:  acetaminophen   Tablet .. 650 milliGRAM(s) Oral every 6 hours PRN  oxyCODONE    5 mG/acetaminophen 325 mG 1 Tablet(s) Oral every 6 hours PRN  senna 2 Tablet(s) Oral at bedtime PRN      LABS:                        9.2    7.2   )-----------( 230      ( 17 Sep 2019 00:43 )             27.8     Hgb Trend: 9.2<--, 8.8<--, 8.6<--, 10.2<--, 9.1<--  09-17    140  |  96  |  15  ----------------------------<  217<H>  4.3   |  27  |  2.87<H>    Ca    9.0      17 Sep 2019 00:43  Phos  6.1     09-16  Mg     2.1     09-16    TPro  6.4  /  Alb  3.6  /  TBili  0.3  /  DBili  x   /  AST  24  /  ALT  30  /  AlkPhos  103  09-17    Creatinine Trend: 2.87<--, 3.91<--, 3.51<--, 3.21<--, 2.70<--, 5.06<--            MICROBIOLOGY:     Culture - Blood (collected 15 Sep 2019 09:40)  Source: .Blood  Preliminary Report (16 Sep 2019 10:00):    No growth to date.    Culture - Blood (collected 15 Sep 2019 04:36)  Source: .Blood  Preliminary Report (16 Sep 2019 05:00):    No growth to date.      RADIOLOGY:  [ ] Reviewed and interpreted by me    EKG: CHIEF COMPLAINT:  65 y/o F w/ PMHx ESRD (HD M/T/T/Sat), IDDM, CHF, CAD, ischemic cardiomyopathy presents to the ED BIBA for fever and AMS. She also reports a productive cough w/ yellow sputum for "days". She otherwise had her full course of dialysis today and reports chills at that time.    Interval Events:  last night, received HD with removal of 1500cc of fluid. Patient also had some hypotension and received some fluids as well. This AM, patient is resting comfortable in bed and saturating well on 2L NC. Blood sugars have been well controlled <300. as per endocrinology, lantus should be kept at 7 for now and humalog at 4 for now. AG has been closed.     REVIEW OF SYSTEMS:  Constitutional: [x] negative [ ] fevers [ ] chills [ ] weight loss [ ] weight gain  HEENT: [x] negative [ ] dry eyes [ ] eye irritation [ ] postnasal drip [ ] nasal congestion  CV: [x] negative  [ ] chest pain [ ] orthopnea [ ] palpitations [ ] murmur  Resp: [ ] negative [x] cough [ ] shortness of breath [ ] dyspnea [ ] wheezing [ ] sputum [ ] hemoptysis  GI: [x] negative [ ] nausea [ ] vomiting [ ] diarrhea [ ] constipation [ ] abd pain [ ] dysphagia   : [x] negative [ ] dysuria [ ] nocturia [ ] hematuria [ ] increased urinary frequency  Musculoskeletal: [x] negative [ ] back pain [ ] myalgias [ ] arthralgias [ ] fracture  Skin: [x] negative [ ] rash [ ] itch  Neurological: [x] negative [ ] headache [ ] dizziness [ ] syncope [ ] weakness [ ] numbness  Psychiatric: [x] negative [ ] anxiety [ ] depression  Endocrine: [x] negative [ ] diabetes [ ] thyroid problem  Hematologic/Lymphatic: [ ] negative [ ] anemia [ ] bleeding problem  Allergic/Immunologic: [ ] negative [ ] itchy eyes [ ] nasal discharge [ ] hives [ ] angioedema  [ ] All other systems negative  [ ] Unable to assess ROS because ________    OBJECTIVE:  ICU Vital Signs Last 24 Hrs  T(C): 36.8 (17 Sep 2019 04:00), Max: 36.8 (16 Sep 2019 08:00)  T(F): 98.2 (17 Sep 2019 04:00), Max: 98.3 (16 Sep 2019 08:00)  HR: 123 (17 Sep 2019 07:00) (81 - 124)  BP: 133/72 (17 Sep 2019 07:00) (78/51 - 140/81)  BP(mean): 97 (17 Sep 2019 07:00) (59 - 105)  ABP: --  ABP(mean): --  RR: 28 (17 Sep 2019 07:00) (11 - 32)  SpO2: 94% (17 Sep 2019 07:00) (91% - 100%)        09-16 @ 07:01  -  09-17 @ 07:00  --------------------------------------------------------  IN: 1480 mL / OUT: 1500 mL / NET: -20 mL      CAPILLARY BLOOD GLUCOSE      POCT Blood Glucose.: 264 mg/dL (17 Sep 2019 04:00)        PHYSICAL EXAM:  General: NAD, patient resting comfortably in bed  HEENT: equal and reactive to light  Lymph Nodes: no lymphadenopathy  Neck: supple  Respiratory: some coarse diffuse wheezing. saturing 98% on 2L NC  Cardiovascular: normal S1, S2, RRR  Abdomen: soft, nontender  Extremities: no LE edema  Skin: intact  Neurological: A&Ox4      LINES:    HOSPITAL MEDICATIONS:  Standing Meds:  ALBUTerol/ipratropium for Nebulization 3 milliLiter(s) Nebulizer every 6 hours  aspirin enteric coated 81 milliGRAM(s) Oral daily  atorvastatin 20 milliGRAM(s) Oral at bedtime  buDESOnide    Inhalation Suspension 0.5 milliGRAM(s) Inhalation two times a day  chlorhexidine 4% Liquid 1 Application(s) Topical <User Schedule>  clopidogrel Tablet 75 milliGRAM(s) Oral daily  dextrose 5%. 1000 milliLiter(s) IV Continuous <Continuous>  docusate sodium 100 milliGRAM(s) Oral three times a day  heparin  Injectable 5000 Unit(s) SubCutaneous every 12 hours  hydrALAZINE 25 milliGRAM(s) Oral three times a day  insulin glargine Injectable (LANTUS) 7 Unit(s) SubCutaneous at bedtime  insulin lispro (HumaLOG) corrective regimen sliding scale   SubCutaneous three times a day before meals  insulin lispro (HumaLOG) corrective regimen sliding scale   SubCutaneous at bedtime  insulin lispro Injectable (HumaLOG) 4 Unit(s) SubCutaneous three times a day with meals  isosorbide   dinitrate Tablet (ISORDIL) 10 milliGRAM(s) Oral three times a day  metoprolol succinate ER 50 milliGRAM(s) Oral daily  pantoprazole    Tablet 40 milliGRAM(s) Oral before breakfast      PRN Meds:  acetaminophen   Tablet .. 650 milliGRAM(s) Oral every 6 hours PRN  oxyCODONE    5 mG/acetaminophen 325 mG 1 Tablet(s) Oral every 6 hours PRN  senna 2 Tablet(s) Oral at bedtime PRN      LABS:                        9.2    7.2   )-----------( 230      ( 17 Sep 2019 00:43 )             27.8     Hgb Trend: 9.2<--, 8.8<--, 8.6<--, 10.2<--, 9.1<--  09-17    140  |  96  |  15  ----------------------------<  217<H>  4.3   |  27  |  2.87<H>    Ca    9.0      17 Sep 2019 00:43  Phos  6.1     09-16  Mg     2.1     09-16    TPro  6.4  /  Alb  3.6  /  TBili  0.3  /  DBili  x   /  AST  24  /  ALT  30  /  AlkPhos  103  09-17    Creatinine Trend: 2.87<--, 3.91<--, 3.51<--, 3.21<--, 2.70<--, 5.06<--            MICROBIOLOGY:     Culture - Blood (collected 15 Sep 2019 09:40)  Source: .Blood  Preliminary Report (16 Sep 2019 10:00):    No growth to date.    Culture - Blood (collected 15 Sep 2019 04:36)  Source: .Blood  Preliminary Report (16 Sep 2019 05:00):    No growth to date.      RADIOLOGY:  [ ] Reviewed and interpreted by me    EKG:

## 2019-09-17 NOTE — PROGRESS NOTE ADULT - ASSESSMENT
63y/o M w/h/o uncontrolled TIDM (HbA1c 8.1% 9/19) c/b neuropathy and retinopathy as well as CAD s/p multiple stents, CHF (EF 50% 10/2018), TIA, PVD s/p b/l fem-pop bypass, ESRD> HD, presenting with upper respiratory illness and hyperglycemia. Developed DKA after missing basal insulin. Feeling better and tolerating POs with on and off nutritional indiscretions. Remains hyperglycemic while on present insulin doses. Will add Humalog correction scale at 2am. If BG >200s again ac dinner will increase premeal humalog. Also adding Humalog at hs if eating snack. BG goal 100s to low 200s on this pt who has multiple DM complications and comorbidities.

## 2019-09-17 NOTE — PROGRESS NOTE ADULT - SUBJECTIVE AND OBJECTIVE BOX
Patient is a 64y old  Female who presents with a chief complaint of sob (17 Sep 2019 16:33)      SUBJECTIVE / OVERNIGHT EVENTS: weak, congested  Review of Systems  chest pain no  palpitations no  sob no  nausea no  headache no    MEDICATIONS  (STANDING):  ALBUTerol/ipratropium for Nebulization. 3 milliLiter(s) Nebulizer every 6 hours  aspirin enteric coated 81 milliGRAM(s) Oral daily  atorvastatin 20 milliGRAM(s) Oral at bedtime  buDESOnide    Inhalation Suspension 0.5 milliGRAM(s) Inhalation two times a day  chlorhexidine 4% Liquid 1 Application(s) Topical <User Schedule>  clopidogrel Tablet 75 milliGRAM(s) Oral daily  dextrose 5%. 1000 milliLiter(s) (50 mL/Hr) IV Continuous <Continuous>  docusate sodium 100 milliGRAM(s) Oral three times a day  heparin  Injectable 5000 Unit(s) SubCutaneous every 12 hours  hydrALAZINE 25 milliGRAM(s) Oral three times a day  insulin glargine Injectable (LANTUS) 7 Unit(s) SubCutaneous at bedtime  insulin lispro (HumaLOG) corrective regimen sliding scale   SubCutaneous three times a day before meals  insulin lispro (HumaLOG) corrective regimen sliding scale   SubCutaneous at bedtime  insulin lispro (HumaLOG) corrective regimen sliding scale   SubCutaneous <User Schedule>  insulin lispro Injectable (HumaLOG) 2 Unit(s) SubCutaneous at bedtime  insulin lispro Injectable (HumaLOG) 5 Unit(s) SubCutaneous three times a day with meals  isosorbide   dinitrate Tablet (ISORDIL) 10 milliGRAM(s) Oral three times a day  metoprolol succinate ER 50 milliGRAM(s) Oral daily  pantoprazole    Tablet 40 milliGRAM(s) Oral before breakfast    MEDICATIONS  (PRN):  acetaminophen   Tablet .. 650 milliGRAM(s) Oral every 6 hours PRN Temp greater or equal to 38.5C (101.3F), Mild Pain (1 - 3)  oxyCODONE    5 mG/acetaminophen 325 mG 1 Tablet(s) Oral every 6 hours PRN Moderate Pain (4 - 6)  senna 2 Tablet(s) Oral at bedtime PRN Constipation      Vital Signs Last 24 Hrs  T(C): 37.3 (17 Sep 2019 19:50), Max: 37.3 (17 Sep 2019 19:50)  T(F): 99.2 (17 Sep 2019 19:50), Max: 99.2 (17 Sep 2019 19:50)  HR: 95 (17 Sep 2019 19:50) (86 - 124)  BP: 112/65 (17 Sep 2019 19:50) (108/55 - 155/77)  BP(mean): 97 (17 Sep 2019 13:00) (78 - 109)  RR: 18 (17 Sep 2019 19:50) (18 - 31)  SpO2: 95% (17 Sep 2019 19:50) (93% - 100%)    PHYSICAL EXAM:  GENERAL: NAD  HEAD:  Atraumatic, Normocephalic  EYES: EOMI, PERRLA, conjunctiva and sclera clear  NECK: Supple, No JVD  CHEST/LUNG: few rhonchi to auscultation bilaterally; No wheeze  HEART: Regular rate and rhythm; No murmurs, rubs, or gallops  ABDOMEN: Soft, Nontender, Nondistended; Bowel sounds present  EXTREMITIES:  2+ Peripheral Pulses, No clubbing, cyanosis, or edema  PSYCH: AAOx3  NEUROLOGY: non-focal  SKIN: No rashes or lesions    LABS:                        9.2    7.2   )-----------( 230      ( 17 Sep 2019 00:43 )             27.8     09-17    140  |  96  |  15  ----------------------------<  217<H>  4.3   |  27  |  2.87<H>    Ca    9.0      17 Sep 2019 00:43  Phos  6.1     09-16  Mg     2.1     09-16    TPro  6.4  /  Alb  3.6  /  TBili  0.3  /  DBili  x   /  AST  24  /  ALT  30  /  AlkPhos  103  09-17              Culture - Blood (collected 15 Sep 2019 09:40)  Source: .Blood  Preliminary Report (16 Sep 2019 10:00):    No growth to date.    Culture - Blood (collected 15 Sep 2019 04:36)  Source: .Blood  Preliminary Report (16 Sep 2019 05:00):    No growth to date.        RADIOLOGY & ADDITIONAL TESTS:    Imaging Personally Reviewed:    Consultant(s) Notes Reviewed:      Care Discussed with Consultants/Other Providers:

## 2019-09-17 NOTE — DIETITIAN INITIAL EVALUATION ADULT. - PERTINENT LABORATORY DATA
(9/17) POCT blood glucose 233-352, Creatinine 2.87, Glucose serum 217,  eGFR if Non- 17, (9/17) HgbA1c 8.1%

## 2019-09-17 NOTE — DIETITIAN INITIAL EVALUATION ADULT. - REASON INDICATOR FOR ASSESSMENT
Consult received for "Nutrition Support Team; HgbA1c 8.1%". Information obtained from patient, past RD notes, EMR.     Per chart, pt is a 64 year old female with PMHx of ESRD (HD M/T/T/Sat), IDDM, CHF, CAD, ischemic cardiomyopathy presents to the ED with upper respiratory illness and DKA. Transferred from MICU to [facility] on 9/17. Endocrine following.

## 2019-09-17 NOTE — PROGRESS NOTE ADULT - SUBJECTIVE AND OBJECTIVE BOX
DIABETES FOLLOW UP NOTE: Saw pt earlier today  INTERVAL HX: 63y/o M w/h/o uncontrolled TIDM (HbA1c 8.1% 9/19) c/b neuropathy and retinopathy as well as CAD s/p multiple stents, CHF (EF 50% 10/2018), TIA, PVD s/p b/l fem-pop bypass, ESRD> HD, presenting with upper respiratory illness and hyperglycemia. Developed DKA after missing basal insulin. Saw pt in MICU. Pt eager to be moved to regular unit. Reports tolerating POs and snacking on crackers on and off. Glycemic control not at goal. Noted fasting BG in 300s after eating overnight. Reports feeling better today but still coughing. No SOB. No hypoglycemia.         Review of Systems:  General: As above  Cardiovascular: No chest pain, palpitations  Respiratory: No SOB, + cough  GI: No nausea, vomiting, abdominal pain  Endocrine: no polyuria, polydipsia or S&Sx of hypoglycemia    Allergies    No Known Allergies    Intolerances      MEDICATIONS:  atorvastatin 20 milliGRAM(s) Oral at bedtime  insulin glargine Injectable (LANTUS) 7 Unit(s) SubCutaneous at bedtime  insulin lispro (HumaLOG) corrective regimen sliding scale   SubCutaneous at bedtime  insulin lispro (HumaLOG) corrective regimen sliding scale   SubCutaneous three times a day before meals  insulin lispro Injectable (HumaLOG) 4 Unit(s) SubCutaneous three times a day with meals      PHYSICAL EXAM:  Vital Signs Last 24 Hrs  T(C): 37.1 (09-17-19 @ 16:26), Max: 37.2 (09-17-19 @ 08:00)  T(F): 98.8 (09-17-19 @ 16:26), Max: 98.9 (09-17-19 @ 08:00)  HR: 90 (09-17-19 @ 16:26) (81 - 124)  BP: 125/61 (09-17-19 @ 16:26) (96/51 - 155/77)  BP(mean): 97 (09-17-19 @ 13:00) (69 - 109)  RR: 20 (09-17-19 @ 16:26) (20 - 31)  SpO2: 94% (09-17-19 @ 16:26) (92% - 100%)  Height (cm): 162.6 (09-15-19 @ 17:00)  Weight (kg): 57.2 (09-15-19 @ 17:00)  BMI (kg/m2): 21.6 (09-15-19 @ 17:00)  GENERAL: Female laying in bed in NAD  Abdomen: Soft, nontender, non distended  Extremities: Warm, 1+ edema in LEs L>R (Chronic)  NEURO: A&OX3    LABS:  POCT Blood Glucose.: 233 mg/dL (09-17-19 @ 11:32)  POCT Blood Glucose.: 352 mg/dL (09-17-19 @ 08:34)  POCT Blood Glucose.: 264 mg/dL (09-17-19 @ 04:00)  POCT Blood Glucose.: 187 mg/dL (09-16-19 @ 21:00)  POCT Blood Glucose.: 297 mg/dL (09-16-19 @ 17:36)  POCT Blood Glucose.: 287 mg/dL (09-16-19 @ 12:52)  POCT Blood Glucose.: 309 mg/dL (09-16-19 @ 10:36)  POCT Blood Glucose.: 230 mg/dL (09-16-19 @ 00:06)  POCT Blood Glucose.: 269 mg/dL (09-15-19 @ 23:02)  POCT Blood Glucose.: 325 mg/dL (09-15-19 @ 22:01)  POCT Blood Glucose.: 331 mg/dL (09-15-19 @ 21:11)  POCT Blood Glucose.: 313 mg/dL (09-15-19 @ 20:35)  POCT Blood Glucose.: 325 mg/dL (09-15-19 @ 20:05)  POCT Blood Glucose.: 215 mg/dL (09-15-19 @ 18:50)  POCT Blood Glucose.: 178 mg/dL (09-15-19 @ 18:10)  POCT Blood Glucose.: 157 mg/dL (09-15-19 @ 17:08)                              9.2    7.2   )-----------( 230      ( 17 Sep 2019 00:43 )             27.8     09-17    140  |  96  |  15  ----------------------------<  217<H>  4.3   |  27  |  2.87<H>    Ca    9.0      17 Sep 2019 00:43  Phos  6.1     09-16  Mg     2.1     09-16    TPro  6.4  /  Alb  3.6  /  TBili  0.3  /  DBili  x   /  AST  24  /  ALT  30  /  AlkPhos  103  09-17    Hemoglobin A1C, Whole Blood: 8.1 % <H> [4.0 - 5.6] (09-16-19 @ 08:04)

## 2019-09-18 LAB
ANION GAP SERPL CALC-SCNC: 19 MMOL/L — HIGH (ref 5–17)
BUN SERPL-MCNC: 28 MG/DL — HIGH (ref 7–23)
CALCIUM SERPL-MCNC: 9.3 MG/DL — SIGNIFICANT CHANGE UP (ref 8.4–10.5)
CHLORIDE SERPL-SCNC: 91 MMOL/L — LOW (ref 96–108)
CO2 SERPL-SCNC: 23 MMOL/L — SIGNIFICANT CHANGE UP (ref 22–31)
CREAT SERPL-MCNC: 4.84 MG/DL — HIGH (ref 0.5–1.3)
GLUCOSE BLDC GLUCOMTR-MCNC: 143 MG/DL — HIGH (ref 70–99)
GLUCOSE BLDC GLUCOMTR-MCNC: 229 MG/DL — HIGH (ref 70–99)
GLUCOSE BLDC GLUCOMTR-MCNC: 232 MG/DL — HIGH (ref 70–99)
GLUCOSE BLDC GLUCOMTR-MCNC: 272 MG/DL — HIGH (ref 70–99)
GLUCOSE BLDC GLUCOMTR-MCNC: 283 MG/DL — HIGH (ref 70–99)
GLUCOSE BLDC GLUCOMTR-MCNC: 316 MG/DL — HIGH (ref 70–99)
GLUCOSE SERPL-MCNC: 279 MG/DL — HIGH (ref 70–99)
HCT VFR BLD CALC: 27.6 % — LOW (ref 34.5–45)
HGB BLD-MCNC: 8.4 G/DL — LOW (ref 11.5–15.5)
MAGNESIUM SERPL-MCNC: 2.1 MG/DL — SIGNIFICANT CHANGE UP (ref 1.6–2.6)
MCHC RBC-ENTMCNC: 30.4 GM/DL — LOW (ref 32–36)
MCHC RBC-ENTMCNC: 32.8 PG — SIGNIFICANT CHANGE UP (ref 27–34)
MCV RBC AUTO: 107.8 FL — HIGH (ref 80–100)
PHOSPHATE SERPL-MCNC: 5 MG/DL — HIGH (ref 2.5–4.5)
PLATELET # BLD AUTO: 216 K/UL — SIGNIFICANT CHANGE UP (ref 150–400)
POTASSIUM SERPL-MCNC: 5.1 MMOL/L — SIGNIFICANT CHANGE UP (ref 3.5–5.3)
POTASSIUM SERPL-SCNC: 5.1 MMOL/L — SIGNIFICANT CHANGE UP (ref 3.5–5.3)
RBC # BLD: 2.56 M/UL — LOW (ref 3.8–5.2)
RBC # FLD: 13.9 % — SIGNIFICANT CHANGE UP (ref 10.3–14.5)
SODIUM SERPL-SCNC: 133 MMOL/L — LOW (ref 135–145)
WBC # BLD: 8.05 K/UL — SIGNIFICANT CHANGE UP (ref 3.8–10.5)
WBC # FLD AUTO: 8.05 K/UL — SIGNIFICANT CHANGE UP (ref 3.8–10.5)

## 2019-09-18 PROCEDURE — 99232 SBSQ HOSP IP/OBS MODERATE 35: CPT

## 2019-09-18 RX ORDER — INSULIN GLARGINE 100 [IU]/ML
8 INJECTION, SOLUTION SUBCUTANEOUS AT BEDTIME
Refills: 0 | Status: DISCONTINUED | OUTPATIENT
Start: 2019-09-18 | End: 2019-09-19

## 2019-09-18 RX ORDER — INSULIN LISPRO 100/ML
6 VIAL (ML) SUBCUTANEOUS
Refills: 0 | Status: DISCONTINUED | OUTPATIENT
Start: 2019-09-18 | End: 2019-09-20

## 2019-09-18 RX ORDER — ERYTHROPOIETIN 10000 [IU]/ML
20000 INJECTION, SOLUTION INTRAVENOUS; SUBCUTANEOUS ONCE
Refills: 0 | Status: COMPLETED | OUTPATIENT
Start: 2019-09-18 | End: 2019-09-18

## 2019-09-18 RX ORDER — ERYTHROPOIETIN 10000 [IU]/ML
10000 INJECTION, SOLUTION INTRAVENOUS; SUBCUTANEOUS ONCE
Refills: 0 | Status: DISCONTINUED | OUTPATIENT
Start: 2019-09-18 | End: 2019-09-18

## 2019-09-18 RX ORDER — SEVELAMER CARBONATE 2400 MG/1
800 POWDER, FOR SUSPENSION ORAL
Refills: 0 | Status: DISCONTINUED | OUTPATIENT
Start: 2019-09-18 | End: 2019-09-20

## 2019-09-18 RX ADMIN — ERYTHROPOIETIN 20000 UNIT(S): 10000 INJECTION, SOLUTION INTRAVENOUS; SUBCUTANEOUS at 19:47

## 2019-09-18 RX ADMIN — Medication 0.5 MILLIGRAM(S): at 05:25

## 2019-09-18 RX ADMIN — Medication 4: at 08:40

## 2019-09-18 RX ADMIN — ISOSORBIDE DINITRATE 10 MILLIGRAM(S): 5 TABLET ORAL at 23:15

## 2019-09-18 RX ADMIN — Medication 81 MILLIGRAM(S): at 12:22

## 2019-09-18 RX ADMIN — Medication 1: at 23:40

## 2019-09-18 RX ADMIN — Medication 3 MILLILITER(S): at 23:14

## 2019-09-18 RX ADMIN — Medication 3: at 12:15

## 2019-09-18 RX ADMIN — SEVELAMER CARBONATE 800 MILLIGRAM(S): 2400 POWDER, FOR SUSPENSION ORAL at 08:46

## 2019-09-18 RX ADMIN — Medication 0.5 MILLIGRAM(S): at 23:14

## 2019-09-18 RX ADMIN — OXYCODONE AND ACETAMINOPHEN 1 TABLET(S): 5; 325 TABLET ORAL at 21:47

## 2019-09-18 RX ADMIN — INSULIN GLARGINE 8 UNIT(S): 100 INJECTION, SOLUTION SUBCUTANEOUS at 23:39

## 2019-09-18 RX ADMIN — Medication 5 UNIT(S): at 08:40

## 2019-09-18 RX ADMIN — Medication 3 MILLILITER(S): at 12:22

## 2019-09-18 RX ADMIN — CHLORHEXIDINE GLUCONATE 1 APPLICATION(S): 213 SOLUTION TOPICAL at 12:27

## 2019-09-18 RX ADMIN — ISOSORBIDE DINITRATE 10 MILLIGRAM(S): 5 TABLET ORAL at 05:25

## 2019-09-18 RX ADMIN — PANTOPRAZOLE SODIUM 40 MILLIGRAM(S): 20 TABLET, DELAYED RELEASE ORAL at 05:26

## 2019-09-18 RX ADMIN — Medication 5 UNIT(S): at 12:13

## 2019-09-18 RX ADMIN — OXYCODONE AND ACETAMINOPHEN 1 TABLET(S): 5; 325 TABLET ORAL at 05:26

## 2019-09-18 RX ADMIN — Medication 50 MILLIGRAM(S): at 05:25

## 2019-09-18 RX ADMIN — SEVELAMER CARBONATE 800 MILLIGRAM(S): 2400 POWDER, FOR SUSPENSION ORAL at 12:23

## 2019-09-18 RX ADMIN — ATORVASTATIN CALCIUM 20 MILLIGRAM(S): 80 TABLET, FILM COATED ORAL at 23:15

## 2019-09-18 RX ADMIN — Medication 3 MILLILITER(S): at 05:25

## 2019-09-18 RX ADMIN — Medication 25 MILLIGRAM(S): at 05:25

## 2019-09-18 RX ADMIN — Medication 25 MILLIGRAM(S): at 23:15

## 2019-09-18 RX ADMIN — Medication 2: at 17:29

## 2019-09-18 RX ADMIN — OXYCODONE AND ACETAMINOPHEN 1 TABLET(S): 5; 325 TABLET ORAL at 06:26

## 2019-09-18 RX ADMIN — CLOPIDOGREL BISULFATE 75 MILLIGRAM(S): 75 TABLET, FILM COATED ORAL at 12:23

## 2019-09-18 NOTE — PROGRESS NOTE ADULT - SUBJECTIVE AND OBJECTIVE BOX
DIABETES FOLLOW UP NOTE: Saw pt earlier today  INTERVAL HX: 63y/o M w/h/o uncontrolled TIDM (HbA1c 8.1% 9/19) c/b neuropathy and retinopathy as well as CAD s/p multiple stents, CHF (EF 50% 10/2018), TIA, PVD s/p b/l fem-pop bypass, ESRD> HD, presenting with upper respiratory illness and hyperglycemia. Developed DKA after missing basal insulin. Pt out of MICU. Reports tolerating POs and eating at different times of the day. Remains hyperglycemic but pt denies eating anything last night. Having breakfast at time of visit. No SOB. No hypoglycemia.         Review of Systems:  General: As above  Cardiovascular: No chest pain, palpitations  Respiratory: No SOB, + cough  GI: No nausea, vomiting, abdominal pain  Endocrine: no polyuria, polydipsia or S&Sx of hypoglycemia    Allergies    No Known Allergies    Intolerances      MEDICATIONS:  atorvastatin 20 milliGRAM(s) Oral at bedtime  insulin glargine Injectable (LANTUS) 7 Unit(s) SubCutaneous at bedtime  insulin lispro (HumaLOG) corrective regimen sliding scale   SubCutaneous at bedtime  insulin lispro (HumaLOG) corrective regimen sliding scale   SubCutaneous three times a day before meals  insulin lispro Injectable (HumaLOG) 5 Unit(s) SubCutaneous three times a day with meals  insulin lispro Injectable (HumaLOG) 2 Unit(s) SubCutaneous at bedtime    PHYSICAL EXAM:  Vital Signs Last 24 Hrs  T(C): 37 (09-18-19 @ 16:33), Max: 37.3 (09-17-19 @ 19:50)  T(F): 98.6 (09-18-19 @ 16:33), Max: 99.2 (09-17-19 @ 19:50)  HR: 84 (09-18-19 @ 16:33) (84 - 113)  BP: 101/60 (09-18-19 @ 16:33) (101/60 - 137/80)  BP(mean): --  RR: 18 (09-18-19 @ 16:33) (18 - 22)  SpO2: 94% (09-18-19 @ 16:33) (93% - 97%)  GENERAL: Female sitting in bed in NAD  Abdomen: Soft, nontender, non distended  Extremities: Warm, 1+ edema in LEs L>R (Chronic)  NEURO: A&OX3    LABS:  POCT Blood Glucose.: 283 mg/dL (09-18-19 @ 12:08)  POCT Blood Glucose.: 316 mg/dL (09-18-19 @ 08:34)  POCT Blood Glucose.: 232 mg/dL (09-18-19 @ 01:58)  POCT Blood Glucose.: 368 mg/dL (09-17-19 @ 22:09)  POCT Blood Glucose.: 267 mg/dL (09-17-19 @ 17:35)  POCT Blood Glucose.: 233 mg/dL (09-17-19 @ 11:32)  POCT Blood Glucose.: 352 mg/dL (09-17-19 @ 08:34)  POCT Blood Glucose.: 264 mg/dL (09-17-19 @ 04:00)  POCT Blood Glucose.: 187 mg/dL (09-16-19 @ 21:00)  POCT Blood Glucose.: 297 mg/dL (09-16-19 @ 17:36)                           8.4    8.05  )-----------( 216      ( 18 Sep 2019 09:30 )             27.6             09-18    133<L>  |  91<L>  |  28<H>  ----------------------------<  279<H>  5.1   |  23  |  4.84<H>    Ca    9.3      18 Sep 2019 06:59  Phos  5.0     09-18  Mg     2.1     09-18    TPro  6.4  /  Alb  3.6  /  TBili  0.3  /  DBili  x   /  AST  24  /  ALT  30  /  AlkPhos  103  09-17    Hemoglobin A1C, Whole Blood: 8.1 % <H> [4.0 - 5.6] (09-16-19 @ 08:04)

## 2019-09-18 NOTE — PROGRESS NOTE ADULT - ASSESSMENT
65y/o M w/h/o uncontrolled TIDM (HbA1c 8.1% 9/19) c/b neuropathy and retinopathy as well as CAD s/p multiple stents, CHF (EF 50% 10/2018), TIA, PVD s/p b/l fem-pop bypass, ESRD> HD, presenting with upper respiratory illness and hyperglycemia. Developed DKA after missing basal insulin. Feeling better and tolerating POs with on and off nutritional indiscretions. Remains hyperglycemic while on present insulin doses. Will continue to increase insulin doses slowly.  BG goal 100s to low 200s on this pt who has multiple DM complications and comorbidities.

## 2019-09-18 NOTE — PROGRESS NOTE ADULT - SUBJECTIVE AND OBJECTIVE BOX
Cardiovascular Disease Progress Note    Overnight events: No acute events overnight.  no cp/sob/palps/dizziness  Otherwise review of systems negative    Objective Findings:  T(C): 37.3 (19 @ 04:52), Max: 37.3 (19 @ 19:50)  HR: 92 (19 @ 04:52) (88 - 113)  BP: 137/80 (19 @ 04:52) (109/64 - 155/77)  RR: 20 (19 @ 04:52) (18 - 27)  SpO2: 95% (19 @ 04:52) (94% - 99%)  Wt(kg): --  Daily     Daily Weight in k.1 (18 Sep 2019 01:00)      Physical Exam:  Gen: NAD  HEENT: EOMI  CV: RRR, normal S1 + S2, no m/r/g  Lungs: CTAB  Abd: soft, non-tender  Ext: No edema        Laboratory Data:                        9.2    7.2   )-----------( 230      ( 17 Sep 2019 00:43 )             27.8         133<L>  |  91<L>  |  28<H>  ----------------------------<  279<H>  5.1   |  23  |  4.84<H>    Ca    9.3      18 Sep 2019 06:59  Phos  5.0       Mg     2.1         TPro  6.4  /  Alb  3.6  /  TBili  0.3  /  DBili  x   /  AST  24  /  ALT  30  /  AlkPhos  103  -              Inpatient Medications:  MEDICATIONS  (STANDING):  ALBUTerol/ipratropium for Nebulization. 3 milliLiter(s) Nebulizer every 6 hours  aspirin enteric coated 81 milliGRAM(s) Oral daily  atorvastatin 20 milliGRAM(s) Oral at bedtime  buDESOnide    Inhalation Suspension 0.5 milliGRAM(s) Inhalation two times a day  chlorhexidine 4% Liquid 1 Application(s) Topical <User Schedule>  clopidogrel Tablet 75 milliGRAM(s) Oral daily  dextrose 5%. 1000 milliLiter(s) (50 mL/Hr) IV Continuous <Continuous>  docusate sodium 100 milliGRAM(s) Oral three times a day  heparin  Injectable 5000 Unit(s) SubCutaneous every 12 hours  hydrALAZINE 25 milliGRAM(s) Oral three times a day  insulin glargine Injectable (LANTUS) 7 Unit(s) SubCutaneous at bedtime  insulin lispro (HumaLOG) corrective regimen sliding scale   SubCutaneous three times a day before meals  insulin lispro (HumaLOG) corrective regimen sliding scale   SubCutaneous at bedtime  insulin lispro (HumaLOG) corrective regimen sliding scale   SubCutaneous <User Schedule>  insulin lispro Injectable (HumaLOG) 2 Unit(s) SubCutaneous at bedtime  insulin lispro Injectable (HumaLOG) 5 Unit(s) SubCutaneous three times a day with meals  isosorbide   dinitrate Tablet (ISORDIL) 10 milliGRAM(s) Oral three times a day  metoprolol succinate ER 50 milliGRAM(s) Oral daily  pantoprazole    Tablet 40 milliGRAM(s) Oral before breakfast  sevelamer carbonate 800 milliGRAM(s) Oral three times a day with meals      Assessment:  -DKA  -upper respiratory infection; RVP +  -elevated cardiac enzymes (hs trop) with relatively preserved ck/ck mb fraction and no obvious ischemic changes on ekg  -chest pain with mild ekg changes and stable hs trop  -NSVT  -pAT  -volume overload  -ischemic cardiomyopathy, cad s/p multiple stents  -esrd on hd  -PAD s/p prior intervention         Recs:  -c/w supportive care for viral URI  -known extensive CAD and ICM. s/p [facility] 2019 --> severe and diffuse instent restenosis along RCA --> unable to stent due to multiple layers of stenting and prior brachytherapy  -will consider complex intervention if true ischemic and refractory sx develop   -c/w beta blockers for nsvt/pat/cad (as above). currently on toprol 50mg, isordil 10mg tid, hydral 25mg tid. uptitrate GDMT as tolerates  -hx of prolonged qtc. avoid qtc prolonging meds  -s/p [facility] 2019: mod-severe MR, pseudo AS (low flow-low gradient), mod-severe LV dysfunction --> recent CTS consult with dr hebert appreciated. plan is for medical management given surgical risk and patient preference  -c/w asa, plavix, statin for ischemic cardiomyopathy/CAD/PAD  -dvt ppx            Over 25 minutes spent on total encounter; more than 50% of the visit was spent counseling and/or coordinating care by the attending physician.      Julián Biswas MD   Cardiovascular Disease  [phone]

## 2019-09-18 NOTE — PROGRESS NOTE ADULT - ASSESSMENT
65 y/o F w/ PMHx ESRD (HD M/T/T/Sat), IDDM, CHF, CAD, ischemic cardiomyopathy presents to the ED BIBA for fever. pt also in DKA, hyperglycemia and now with hyperkalemia as well as DKA    1- esrd  2- hyperkalemia resolved   3- DKA  4- HTN   5- chf      hd 3. 0  hr f 160  2.2  liter fluid removal  2 k bath bfr 400 dfr 600   epogen 68825 U ivp

## 2019-09-18 NOTE — PROGRESS NOTE ADULT - SUBJECTIVE AND OBJECTIVE BOX
CC: f/u for fever    Patient reports: she is afebrile, no focal complaints    REVIEW OF SYSTEMS:  All other review of systems negative (Comprehensive ROS)    Antimicrobials Day #  :off    Other Medications Reviewed    T(F): 98 (09-18-19 @ 10:05), Max: 99.2 (09-17-19 @ 19:50)  HR: 88 (09-18-19 @ 10:05)  BP: 137/80 (09-18-19 @ 10:05)  RR: 18 (09-18-19 @ 10:13)  SpO2: 93% (09-18-19 @ 10:13)  Wt(kg): --    PHYSICAL EXAM:  General: alert, no acute distress  Eyes:  anicteric, no conjunctival injection, no discharge  Oropharynx: no lesions or injection 	  Neck: supple, without adenopathy  Lungs: clear to auscultation  Heart: regular rate and rhythm; no murmur, rubs or gallops  Abdomen: soft, nondistended, nontender, without mass or organomegaly  Skin: no lesions  Extremities: no clubbing, cyanosis, = edema  Neurologic: alert, oriented, moves all extremities    LAB RESULTS:                        8.4    8.05  )-----------( 216      ( 18 Sep 2019 09:30 )             27.6     09-18    133<L>  |  91<L>  |  28<H>  ----------------------------<  279<H>  5.1   |  23  |  4.84<H>    Ca    9.3      18 Sep 2019 06:59  Phos  5.0     09-18  Mg     2.1     09-18    TPro  6.4  /  Alb  3.6  /  TBili  0.3  /  DBili  x   /  AST  24  /  ALT  30  /  AlkPhos  103  09-17    LIVER FUNCTIONS - ( 17 Sep 2019 00:43 )  Alb: 3.6 g/dL / Pro: 6.4 g/dL / ALK PHOS: 103 U/L / ALT: 30 U/L / AST: 24 U/L / GGT: x             MICROBIOLOGY:  RECENT CULTURES:  09-15 @ 09:40 .Blood     No growth to date.      09-15 @ 04:36 .Blood     No growth to date.          RADIOLOGY REVIEWED:  < from: Xray Chest 1 View-PORTABLE IMMEDIATE (09.14.19 @ 23:33) >  IMPRESSION: Left basilar linear opacity is similar to the prior study.   Lungs otherwise clear.

## 2019-09-18 NOTE — PROGRESS NOTE ADULT - SUBJECTIVE AND OBJECTIVE BOX
Patient is a 64y old  Female who presents with a chief complaint of sob (18 Sep 2019 17:14)      SUBJECTIVE / OVERNIGHT EVENTS: weak, tired  Review of Systems  chest pain no  palpitations no  sob no  nausea no  headache no    MEDICATIONS  (STANDING):  ALBUTerol/ipratropium for Nebulization. 3 milliLiter(s) Nebulizer every 6 hours  aspirin enteric coated 81 milliGRAM(s) Oral daily  atorvastatin 20 milliGRAM(s) Oral at bedtime  buDESOnide    Inhalation Suspension 0.5 milliGRAM(s) Inhalation two times a day  chlorhexidine 4% Liquid 1 Application(s) Topical <User Schedule>  clopidogrel Tablet 75 milliGRAM(s) Oral daily  dextrose 5%. 1000 milliLiter(s) (50 mL/Hr) IV Continuous <Continuous>  docusate sodium 100 milliGRAM(s) Oral three times a day  epoetin azalea Injectable 12686 Unit(s) IV Push once  heparin  Injectable 5000 Unit(s) SubCutaneous every 12 hours  hydrALAZINE 25 milliGRAM(s) Oral three times a day  insulin glargine Injectable (LANTUS) 8 Unit(s) SubCutaneous at bedtime  insulin lispro (HumaLOG) corrective regimen sliding scale   SubCutaneous three times a day before meals  insulin lispro (HumaLOG) corrective regimen sliding scale   SubCutaneous at bedtime  insulin lispro (HumaLOG) corrective regimen sliding scale   SubCutaneous <User Schedule>  insulin lispro Injectable (HumaLOG) 2 Unit(s) SubCutaneous at bedtime  insulin lispro Injectable (HumaLOG) 6 Unit(s) SubCutaneous three times a day with meals  isosorbide   dinitrate Tablet (ISORDIL) 10 milliGRAM(s) Oral three times a day  metoprolol succinate ER 50 milliGRAM(s) Oral daily  pantoprazole    Tablet 40 milliGRAM(s) Oral before breakfast  sevelamer carbonate 800 milliGRAM(s) Oral three times a day with meals    MEDICATIONS  (PRN):  acetaminophen   Tablet .. 650 milliGRAM(s) Oral every 6 hours PRN Temp greater or equal to 38.5C (101.3F), Mild Pain (1 - 3)  oxyCODONE    5 mG/acetaminophen 325 mG 1 Tablet(s) Oral every 6 hours PRN Moderate Pain (4 - 6)  senna 2 Tablet(s) Oral at bedtime PRN Constipation      Vital Signs Last 24 Hrs  T(C): 36.8 (18 Sep 2019 18:00), Max: 37.3 (17 Sep 2019 19:50)  T(F): 98.2 (18 Sep 2019 18:00), Max: 99.2 (17 Sep 2019 19:50)  HR: 80 (18 Sep 2019 18:00) (80 - 113)  BP: 140/75 (18 Sep 2019 18:00) (101/60 - 140/75)  BP(mean): --  RR: 20 (18 Sep 2019 18:00) (18 - 22)  SpO2: 99% (18 Sep 2019 18:00) (93% - 99%)    PHYSICAL EXAM:  GENERAL: NAD  HEAD:  Atraumatic, Normocephalic  EYES: EOMI, PERRLA, conjunctiva and sclera clear  NECK: Supple, No JVD  CHEST/LUNG: Clear to auscultation bilaterally; No wheeze  HEART: Regular rate and rhythm; No murmurs, rubs, or gallops  ABDOMEN: Soft, Nontender, Nondistended; Bowel sounds present  EXTREMITIES:  2+ Peripheral Pulses, No clubbing, cyanosis, or edema  PSYCH: AAOx3  NEUROLOGY: non-focal  SKIN: No rashes or lesions    LABS:                        8.4    8.05  )-----------( 216      ( 18 Sep 2019 09:30 )             27.6     09-18    133<L>  |  91<L>  |  28<H>  ----------------------------<  279<H>  5.1   |  23  |  4.84<H>    Ca    9.3      18 Sep 2019 06:59  Phos  5.0     09-18  Mg     2.1     09-18    TPro  6.4  /  Alb  3.6  /  TBili  0.3  /  DBili  x   /  AST  24  /  ALT  30  /  AlkPhos  103  09-17                RADIOLOGY & ADDITIONAL TESTS:    Imaging Personally Reviewed:    Consultant(s) Notes Reviewed:      Care Discussed with Consultants/Other Providers:

## 2019-09-18 NOTE — PROGRESS NOTE ADULT - ASSESSMENT
64 f with    COPD exacerbation/ Viral syndrome  - nebs  - pulmonary follow  - ID evaluation noted    ESRD  - HD  - Nephrology follow    Hyperkalemia  - follow  -nephrology follow    IDDM type 1/ DKA  - BS control  - Endocrine follow    CAD  - continue Rx    Acute on Chronic Systolic Congestive Heart Failure  - HD  - Cardiology follow    Anxiety/ Depression  - stable    DCP home    d/w patient  Pierce Viera MD pager [phone]

## 2019-09-18 NOTE — PROGRESS NOTE ADULT - SUBJECTIVE AND OBJECTIVE BOX
[facility]   [phone]  DIALYSIS NOTE  Chief Complaint: ESRD/Ongoing hemodialysis requirement.     24 hour events/subjective:      seen earlier. sleeping awoken states cough is improving       ALLERGIES & MEDICATIONS  --------------------------------------------------------------------------------  Allergies    No Known Allergies    Intolerances      Standing Inpatient Medications  ALBUTerol/ipratropium for Nebulization. 3 milliLiter(s) Nebulizer every 6 hours  aspirin enteric coated 81 milliGRAM(s) Oral daily  atorvastatin 20 milliGRAM(s) Oral at bedtime  buDESOnide    Inhalation Suspension 0.5 milliGRAM(s) Inhalation two times a day  chlorhexidine 4% Liquid 1 Application(s) Topical <User Schedule>  clopidogrel Tablet 75 milliGRAM(s) Oral daily  dextrose 5%. 1000 milliLiter(s) IV Continuous <Continuous>  docusate sodium 100 milliGRAM(s) Oral three times a day  heparin  Injectable 5000 Unit(s) SubCutaneous every 12 hours  hydrALAZINE 25 milliGRAM(s) Oral three times a day  insulin glargine Injectable (LANTUS) 8 Unit(s) SubCutaneous at bedtime  insulin lispro (HumaLOG) corrective regimen sliding scale   SubCutaneous three times a day before meals  insulin lispro (HumaLOG) corrective regimen sliding scale   SubCutaneous at bedtime  insulin lispro (HumaLOG) corrective regimen sliding scale   SubCutaneous <User Schedule>  insulin lispro Injectable (HumaLOG) 2 Unit(s) SubCutaneous at bedtime  insulin lispro Injectable (HumaLOG) 6 Unit(s) SubCutaneous three times a day with meals  isosorbide   dinitrate Tablet (ISORDIL) 10 milliGRAM(s) Oral three times a day  metoprolol succinate ER 50 milliGRAM(s) Oral daily  pantoprazole    Tablet 40 milliGRAM(s) Oral before breakfast  sevelamer carbonate 800 milliGRAM(s) Oral three times a day with meals    PRN Inpatient Medications  acetaminophen   Tablet .. 650 milliGRAM(s) Oral every 6 hours PRN  oxyCODONE    5 mG/acetaminophen 325 mG 1 Tablet(s) Oral every 6 hours PRN  senna 2 Tablet(s) Oral at bedtime PRN      REVIEW OF SYSTEMS  --------------------------------------------------------------------------------  no itching or rash  no fever or chill  no cp or palp   + sob +  cough   no N/V/D/ no abd pain   ext +  edema      VITALS/PHYSICAL EXAM  --------------------------------------------------------------------------------  T(C): 36.8 (09-18-19 @ 18:00), Max: 37.3 (09-18-19 @ 04:52)  HR: 80 (09-18-19 @ 18:00) (80 - 113)  BP: 140/75 (09-18-19 @ 18:00) (101/60 - 140/75)  RR: 20 (09-18-19 @ 18:00) (18 - 22)  SpO2: 99% (09-18-19 @ 18:00) (93% - 99%)  Wt(kg): --        09-17-19 @ 07:01  -  09-18-19 @ 07:00  --------------------------------------------------------  IN: 660 mL / OUT: 0 mL / NET: 660 mL    09-18-19 @ 07:01  -  09-18-19 @ 20:17  --------------------------------------------------------  IN: 720 mL / OUT: 0 mL / NET: 720 mL      Physical Exam:  	Gen: ill appearing coughing   	+ JVD  	Pulm: decrease bs coarse bs with insp wheezing   	CV: RRR, S1S2; no rub  	Abd: +BS, soft, nontender/nondistended  	: No suprapubic tenderness  	UE: Warm, no cyanosis  no clubbing,  no edema  	LE: Warm, no cyanosis  no clubbing, 2+ pitting LLE > RLE  edema  	Neuro: still  lethargic   	Vascular access: + avf + bruit and thrill 	  	      LABS/STUDIES  --------------------------------------------------------------------------------              8.4    8.05  >-----------<  216      [09-18-19 @ 09:30]              27.6     133  |  91  |  28  ----------------------------<  279      [09-18-19 @ 06:59]  5.1   |  23  |  4.84        Ca     9.3     [09-18-19 @ 06:59]      Mg     2.1     [09-18-19 @ 06:59]      Phos  5.0     [09-18-19 @ 06:59]    TPro  6.4  /  Alb  3.6  /  TBili  0.3  /  DBili  x   /  AST  24  /  ALT  30  /  AlkPhos  103  [09-17-19 @ 00:43]                  imp/suggest: ESRD      Hemodialysis Prescription:  	Access:  	Dialyzer: revaclear   	Blood Flow (mL/Min): 400  	Dialysate Flow (mL/Min): 600  	Target UF (Liters):  	Treatment Time:  	Potassium:   	Calcium: 2.5  	  YOLANDA    Vitamin D     continue with hd   see hd flow sheet

## 2019-09-18 NOTE — PROGRESS NOTE ADULT - ASSESSMENT
64y female with HLD, CAD, ESRD on HD, CHF, severe AS, COPD, CVA, PVD s/p b/l fem pop bypass, hilar adenopathy, DM, prior hospitalizations for DKA, and parainfluenza multifocal pneumonia in June  Returns with fever and AMS.   Found febrile to 101.6F & leukocytosis of 11.9K.   CXR without new infiltrate.   Resp viral panel positive for entero/rhino virus.   Elevated troponin T,  [place] of 06631   Hyperglycemic with acidosis ? DKA   also has same cold & cough   Now afebrile, alert, mental status at baseline, WBC normal  Bld Cxs negative  Limited vanco and zosyn stopped  Stable off antibiotics  PLAN:  1. monitor off antibiotics  2Supportive care  3.No additional ID w/u planned, we will stop actively following, please call if ID issues arise

## 2019-09-18 NOTE — PROGRESS NOTE ADULT - SUBJECTIVE AND OBJECTIVE BOX
PULMONARY PROGRESS NOTE    NATHAN MEEKS  MRN-[id number]    Patient is a 64y old  Female who presents with a chief complaint of sob (18 Sep 2019 12:58)      HPI:  -  -    ROS:   -    ACTIVE MEDICATION LIST:  MEDICATIONS  (STANDING):  ALBUTerol/ipratropium for Nebulization. 3 milliLiter(s) Nebulizer every 6 hours  aspirin enteric coated 81 milliGRAM(s) Oral daily  atorvastatin 20 milliGRAM(s) Oral at bedtime  buDESOnide    Inhalation Suspension 0.5 milliGRAM(s) Inhalation two times a day  chlorhexidine 4% Liquid 1 Application(s) Topical <User Schedule>  clopidogrel Tablet 75 milliGRAM(s) Oral daily  dextrose 5%. 1000 milliLiter(s) (50 mL/Hr) IV Continuous <Continuous>  docusate sodium 100 milliGRAM(s) Oral three times a day  epoetin azalea Injectable 22396 Unit(s) IV Push once  heparin  Injectable 5000 Unit(s) SubCutaneous every 12 hours  hydrALAZINE 25 milliGRAM(s) Oral three times a day  insulin glargine Injectable (LANTUS) 7 Unit(s) SubCutaneous at bedtime  insulin lispro (HumaLOG) corrective regimen sliding scale   SubCutaneous three times a day before meals  insulin lispro (HumaLOG) corrective regimen sliding scale   SubCutaneous at bedtime  insulin lispro (HumaLOG) corrective regimen sliding scale   SubCutaneous <User Schedule>  insulin lispro Injectable (HumaLOG) 2 Unit(s) SubCutaneous at bedtime  insulin lispro Injectable (HumaLOG) 5 Unit(s) SubCutaneous three times a day with meals  isosorbide   dinitrate Tablet (ISORDIL) 10 milliGRAM(s) Oral three times a day  metoprolol succinate ER 50 milliGRAM(s) Oral daily  pantoprazole    Tablet 40 milliGRAM(s) Oral before breakfast  sevelamer carbonate 800 milliGRAM(s) Oral three times a day with meals    MEDICATIONS  (PRN):  acetaminophen   Tablet .. 650 milliGRAM(s) Oral every 6 hours PRN Temp greater or equal to 38.5C (101.3F), Mild Pain (1 - 3)  oxyCODONE    5 mG/acetaminophen 325 mG 1 Tablet(s) Oral every 6 hours PRN Moderate Pain (4 - 6)  senna 2 Tablet(s) Oral at bedtime PRN Constipation      EXAM:  Vital Signs Last 24 Hrs  T(C): 36.7 (18 Sep 2019 10:05), Max: 37.3 (17 Sep 2019 19:50)  T(F): 98 (18 Sep 2019 10:05), Max: 99.2 (17 Sep 2019 19:50)  HR: 88 (18 Sep 2019 10:05) (88 - 113)  BP: 137/80 (18 Sep 2019 10:05) (109/64 - 137/80)  BP(mean): --  RR: 18 (18 Sep 2019 10:13) (18 - 22)  SpO2: 93% (18 Sep 2019 10:13) (93% - 97%)    GENERAL: The patient is awake and alert in no apparent distress.     LUNGS: Clear to auscultation without wheezing, rales or rhonchi; respirations unlabored    HEART: Regular rate and rhythm without murmur.                            8.4    8.05  )-----------( 216      ( 18 Sep 2019 09:30 )             27.6       09-18    133<L>  |  91<L>  |  28<H>  ----------------------------<  279<H>  5.1   |  23  |  4.84<H>    Ca    9.3      18 Sep 2019 06:59  Phos  5.0     09-18  Mg     2.1     09-18    TPro  6.4  /  Alb  3.6  /  TBili  0.3  /  DBili  x   /  AST  24  /  ALT  30  /  AlkPhos  103  09-17        PROBLEM LIST:  64y Female with HEALTH ISSUES - PROBLEM Dx:  Hyperlipidemia, unspecified hyperlipidemia type: Hyperlipidemia, unspecified hyperlipidemia type  Essential hypertension: Essential hypertension  Uncontrolled type 1 diabetes mellitus with ESRD (end-stage renal disease): Uncontrolled type 1 diabetes mellitus with ESRD (end-stage renal disease)  DKA, type 1, not at goal: DKA, type 1, not at goal            RECS:    supportive care for enterovirus  nebs q6hrs PRN  IS  02 as needed     Please call with any questions.    Chelle Kapoor DO  [facility] Pulmonary/Sleep Medicine  [phone] PULMONARY PROGRESS NOTE    NATHAN MEEKS  MRN-[id number]    Patient is a 64y old  Female who presents with a chief complaint of sob (18 Sep 2019 12:58)      HPI:  asleep on room air  denies any resp complaints     ROS:   -    ACTIVE MEDICATION LIST:  MEDICATIONS  (STANDING):  ALBUTerol/ipratropium for Nebulization. 3 milliLiter(s) Nebulizer every 6 hours  aspirin enteric coated 81 milliGRAM(s) Oral daily  atorvastatin 20 milliGRAM(s) Oral at bedtime  buDESOnide    Inhalation Suspension 0.5 milliGRAM(s) Inhalation two times a day  chlorhexidine 4% Liquid 1 Application(s) Topical <User Schedule>  clopidogrel Tablet 75 milliGRAM(s) Oral daily  dextrose 5%. 1000 milliLiter(s) (50 mL/Hr) IV Continuous <Continuous>  docusate sodium 100 milliGRAM(s) Oral three times a day  epoetin azalea Injectable 95877 Unit(s) IV Push once  heparin  Injectable 5000 Unit(s) SubCutaneous every 12 hours  hydrALAZINE 25 milliGRAM(s) Oral three times a day  insulin glargine Injectable (LANTUS) 7 Unit(s) SubCutaneous at bedtime  insulin lispro (HumaLOG) corrective regimen sliding scale   SubCutaneous three times a day before meals  insulin lispro (HumaLOG) corrective regimen sliding scale   SubCutaneous at bedtime  insulin lispro (HumaLOG) corrective regimen sliding scale   SubCutaneous <User Schedule>  insulin lispro Injectable (HumaLOG) 2 Unit(s) SubCutaneous at bedtime  insulin lispro Injectable (HumaLOG) 5 Unit(s) SubCutaneous three times a day with meals  isosorbide   dinitrate Tablet (ISORDIL) 10 milliGRAM(s) Oral three times a day  metoprolol succinate ER 50 milliGRAM(s) Oral daily  pantoprazole    Tablet 40 milliGRAM(s) Oral before breakfast  sevelamer carbonate 800 milliGRAM(s) Oral three times a day with meals    MEDICATIONS  (PRN):  acetaminophen   Tablet .. 650 milliGRAM(s) Oral every 6 hours PRN Temp greater or equal to 38.5C (101.3F), Mild Pain (1 - 3)  oxyCODONE    5 mG/acetaminophen 325 mG 1 Tablet(s) Oral every 6 hours PRN Moderate Pain (4 - 6)  senna 2 Tablet(s) Oral at bedtime PRN Constipation      EXAM:  Vital Signs Last 24 Hrs  T(C): 36.7 (18 Sep 2019 10:05), Max: 37.3 (17 Sep 2019 19:50)  T(F): 98 (18 Sep 2019 10:05), Max: 99.2 (17 Sep 2019 19:50)  HR: 88 (18 Sep 2019 10:05) (88 - 113)  BP: 137/80 (18 Sep 2019 10:05) (109/64 - 137/80)  BP(mean): --  RR: 18 (18 Sep 2019 10:13) (18 - 22)  SpO2: 93% (18 Sep 2019 10:13) (93% - 97%)    GENERAL: The patient is awake and alert in no apparent distress.     LUNGS: Clear to auscultation without wheezing, rales or rhonchi; respirations unlabored    HEART: Regular rate and rhythm without murmur.                            8.4    8.05  )-----------( 216      ( 18 Sep 2019 09:30 )             27.6       09-18    133<L>  |  91<L>  |  28<H>  ----------------------------<  279<H>  5.1   |  23  |  4.84<H>    Ca    9.3      18 Sep 2019 06:59  Phos  5.0     09-18  Mg     2.1     09-18    TPro  6.4  /  Alb  3.6  /  TBili  0.3  /  DBili  x   /  AST  24  /  ALT  30  /  AlkPhos  103  09-17        PROBLEM LIST:  64y Female with HEALTH ISSUES - PROBLEM Dx:  Hyperlipidemia, unspecified hyperlipidemia type: Hyperlipidemia, unspecified hyperlipidemia type  Essential hypertension: Essential hypertension  Uncontrolled type 1 diabetes mellitus with ESRD (end-stage renal disease): Uncontrolled type 1 diabetes mellitus with ESRD (end-stage renal disease)  DKA, type 1, not at goal: DKA, type 1, not at goal            RECS:    supportive care for enterovirus  nebs q6hrs PRN  IS  02 as needed     Please call with any questions.    Chelle Kapoor DO  [facility] Pulmonary/Sleep Medicine  [phone]

## 2019-09-19 ENCOUNTER — TRANSCRIPTION ENCOUNTER (OUTPATIENT)
Age: 64
End: 2019-09-19

## 2019-09-19 LAB
ANION GAP SERPL CALC-SCNC: 15 MMOL/L — SIGNIFICANT CHANGE UP (ref 5–17)
BUN SERPL-MCNC: 18 MG/DL — SIGNIFICANT CHANGE UP (ref 7–23)
CALCIUM SERPL-MCNC: 9.5 MG/DL — SIGNIFICANT CHANGE UP (ref 8.4–10.5)
CHLORIDE SERPL-SCNC: 92 MMOL/L — LOW (ref 96–108)
CO2 SERPL-SCNC: 28 MMOL/L — SIGNIFICANT CHANGE UP (ref 22–31)
CREAT SERPL-MCNC: 3.66 MG/DL — HIGH (ref 0.5–1.3)
GLUCOSE BLDC GLUCOMTR-MCNC: 119 MG/DL — HIGH (ref 70–99)
GLUCOSE BLDC GLUCOMTR-MCNC: 207 MG/DL — HIGH (ref 70–99)
GLUCOSE BLDC GLUCOMTR-MCNC: 266 MG/DL — HIGH (ref 70–99)
GLUCOSE BLDC GLUCOMTR-MCNC: 327 MG/DL — HIGH (ref 70–99)
GLUCOSE BLDC GLUCOMTR-MCNC: 356 MG/DL — HIGH (ref 70–99)
GLUCOSE BLDC GLUCOMTR-MCNC: 63 MG/DL — LOW (ref 70–99)
GLUCOSE BLDC GLUCOMTR-MCNC: 64 MG/DL — LOW (ref 70–99)
GLUCOSE BLDC GLUCOMTR-MCNC: 65 MG/DL — LOW (ref 70–99)
GLUCOSE BLDC GLUCOMTR-MCNC: 67 MG/DL — LOW (ref 70–99)
GLUCOSE BLDC GLUCOMTR-MCNC: 80 MG/DL — SIGNIFICANT CHANGE UP (ref 70–99)
GLUCOSE SERPL-MCNC: 350 MG/DL — HIGH (ref 70–99)
HCT VFR BLD CALC: 27.1 % — LOW (ref 34.5–45)
HGB BLD-MCNC: 8.3 G/DL — LOW (ref 11.5–15.5)
MCHC RBC-ENTMCNC: 30.6 GM/DL — LOW (ref 32–36)
MCHC RBC-ENTMCNC: 33.1 PG — SIGNIFICANT CHANGE UP (ref 27–34)
MCV RBC AUTO: 108 FL — HIGH (ref 80–100)
PLATELET # BLD AUTO: 195 K/UL — SIGNIFICANT CHANGE UP (ref 150–400)
POTASSIUM SERPL-MCNC: 4.2 MMOL/L — SIGNIFICANT CHANGE UP (ref 3.5–5.3)
POTASSIUM SERPL-SCNC: 4.2 MMOL/L — SIGNIFICANT CHANGE UP (ref 3.5–5.3)
RBC # BLD: 2.51 M/UL — LOW (ref 3.8–5.2)
RBC # FLD: 13.9 % — SIGNIFICANT CHANGE UP (ref 10.3–14.5)
SODIUM SERPL-SCNC: 135 MMOL/L — SIGNIFICANT CHANGE UP (ref 135–145)
WBC # BLD: 5.35 K/UL — SIGNIFICANT CHANGE UP (ref 3.8–10.5)
WBC # FLD AUTO: 5.35 K/UL — SIGNIFICANT CHANGE UP (ref 3.8–10.5)

## 2019-09-19 PROCEDURE — 99232 SBSQ HOSP IP/OBS MODERATE 35: CPT

## 2019-09-19 RX ORDER — INSULIN GLARGINE 100 [IU]/ML
7 INJECTION, SOLUTION SUBCUTANEOUS
Qty: 0 | Refills: 0 | DISCHARGE

## 2019-09-19 RX ORDER — DEXTROSE 50 % IN WATER 50 %
12.5 SYRINGE (ML) INTRAVENOUS ONCE
Refills: 0 | Status: DISCONTINUED | OUTPATIENT
Start: 2019-09-19 | End: 2019-09-20

## 2019-09-19 RX ORDER — INSULIN GLARGINE 100 [IU]/ML
9 INJECTION, SOLUTION SUBCUTANEOUS AT BEDTIME
Refills: 0 | Status: DISCONTINUED | OUTPATIENT
Start: 2019-09-19 | End: 2019-09-20

## 2019-09-19 RX ORDER — SEVELAMER CARBONATE 2400 MG/1
1 POWDER, FOR SUSPENSION ORAL
Qty: 90 | Refills: 0
Start: 2019-09-19

## 2019-09-19 RX ORDER — IPRATROPIUM/ALBUTEROL SULFATE 18-103MCG
3 AEROSOL WITH ADAPTER (GRAM) INHALATION
Qty: 120 | Refills: 0
Start: 2019-09-19

## 2019-09-19 RX ORDER — BUDESONIDE, MICRONIZED 100 %
2 POWDER (GRAM) MISCELLANEOUS
Qty: 60 | Refills: 0
Start: 2019-09-19

## 2019-09-19 RX ORDER — METOPROLOL TARTRATE 50 MG
1 TABLET ORAL
Qty: 0 | Refills: 0 | DISCHARGE
Start: 2019-09-19

## 2019-09-19 RX ORDER — INSULIN GLARGINE 100 [IU]/ML
6 INJECTION, SOLUTION SUBCUTANEOUS AT BEDTIME
Refills: 0 | Status: COMPLETED | OUTPATIENT
Start: 2019-09-19 | End: 2019-09-19

## 2019-09-19 RX ORDER — GLUCAGON INJECTION, SOLUTION 0.5 MG/.1ML
1 INJECTION, SOLUTION SUBCUTANEOUS ONCE
Refills: 0 | Status: DISCONTINUED | OUTPATIENT
Start: 2019-09-19 | End: 2019-09-20

## 2019-09-19 RX ORDER — IPRATROPIUM/ALBUTEROL SULFATE 18-103MCG
3 AEROSOL WITH ADAPTER (GRAM) INHALATION
Qty: 0 | Refills: 0 | DISCHARGE
Start: 2019-09-19

## 2019-09-19 RX ORDER — DEXTROSE 50 % IN WATER 50 %
25 SYRINGE (ML) INTRAVENOUS ONCE
Refills: 0 | Status: DISCONTINUED | OUTPATIENT
Start: 2019-09-19 | End: 2019-09-20

## 2019-09-19 RX ORDER — BUDESONIDE, MICRONIZED 100 %
2 POWDER (GRAM) MISCELLANEOUS
Qty: 60 | Refills: 0
Start: 2019-09-19 | End: 2019-10-18

## 2019-09-19 RX ORDER — DEXTROSE 50 % IN WATER 50 %
15 SYRINGE (ML) INTRAVENOUS ONCE
Refills: 0 | Status: DISCONTINUED | OUTPATIENT
Start: 2019-09-19 | End: 2019-09-20

## 2019-09-19 RX ORDER — SODIUM CHLORIDE 9 MG/ML
1000 INJECTION, SOLUTION INTRAVENOUS
Refills: 0 | Status: DISCONTINUED | OUTPATIENT
Start: 2019-09-19 | End: 2019-09-20

## 2019-09-19 RX ADMIN — Medication 50 MILLIGRAM(S): at 05:14

## 2019-09-19 RX ADMIN — Medication 3: at 18:40

## 2019-09-19 RX ADMIN — ISOSORBIDE DINITRATE 10 MILLIGRAM(S): 5 TABLET ORAL at 21:57

## 2019-09-19 RX ADMIN — OXYCODONE AND ACETAMINOPHEN 1 TABLET(S): 5; 325 TABLET ORAL at 21:48

## 2019-09-19 RX ADMIN — OXYCODONE AND ACETAMINOPHEN 1 TABLET(S): 5; 325 TABLET ORAL at 13:13

## 2019-09-19 RX ADMIN — CHLORHEXIDINE GLUCONATE 1 APPLICATION(S): 213 SOLUTION TOPICAL at 12:05

## 2019-09-19 RX ADMIN — Medication 6 UNIT(S): at 18:40

## 2019-09-19 RX ADMIN — Medication 0.5 MILLIGRAM(S): at 21:56

## 2019-09-19 RX ADMIN — Medication 25 MILLIGRAM(S): at 21:57

## 2019-09-19 RX ADMIN — ISOSORBIDE DINITRATE 10 MILLIGRAM(S): 5 TABLET ORAL at 13:06

## 2019-09-19 RX ADMIN — ISOSORBIDE DINITRATE 10 MILLIGRAM(S): 5 TABLET ORAL at 05:14

## 2019-09-19 RX ADMIN — SEVELAMER CARBONATE 800 MILLIGRAM(S): 2400 POWDER, FOR SUSPENSION ORAL at 18:41

## 2019-09-19 RX ADMIN — Medication 6 UNIT(S): at 08:46

## 2019-09-19 RX ADMIN — Medication 0.5 MILLIGRAM(S): at 13:13

## 2019-09-19 RX ADMIN — Medication 3 MILLILITER(S): at 18:41

## 2019-09-19 RX ADMIN — SEVELAMER CARBONATE 800 MILLIGRAM(S): 2400 POWDER, FOR SUSPENSION ORAL at 13:05

## 2019-09-19 RX ADMIN — Medication 3 MILLILITER(S): at 12:01

## 2019-09-19 RX ADMIN — PANTOPRAZOLE SODIUM 40 MILLIGRAM(S): 20 TABLET, DELAYED RELEASE ORAL at 05:14

## 2019-09-19 RX ADMIN — Medication 81 MILLIGRAM(S): at 12:01

## 2019-09-19 RX ADMIN — OXYCODONE AND ACETAMINOPHEN 1 TABLET(S): 5; 325 TABLET ORAL at 22:18

## 2019-09-19 RX ADMIN — Medication 2: at 13:05

## 2019-09-19 RX ADMIN — Medication 6 UNIT(S): at 13:05

## 2019-09-19 RX ADMIN — Medication 3 MILLILITER(S): at 05:15

## 2019-09-19 RX ADMIN — CLOPIDOGREL BISULFATE 75 MILLIGRAM(S): 75 TABLET, FILM COATED ORAL at 12:01

## 2019-09-19 RX ADMIN — OXYCODONE AND ACETAMINOPHEN 1 TABLET(S): 5; 325 TABLET ORAL at 13:30

## 2019-09-19 RX ADMIN — SEVELAMER CARBONATE 800 MILLIGRAM(S): 2400 POWDER, FOR SUSPENSION ORAL at 08:46

## 2019-09-19 RX ADMIN — Medication 2: at 02:04

## 2019-09-19 RX ADMIN — Medication 5: at 08:45

## 2019-09-19 RX ADMIN — ATORVASTATIN CALCIUM 20 MILLIGRAM(S): 80 TABLET, FILM COATED ORAL at 21:57

## 2019-09-19 RX ADMIN — Medication 25 MILLIGRAM(S): at 05:14

## 2019-09-19 NOTE — PATIENT PROFILE ADULT. - NS PRO CONTRA FLU 1
Addended by: Michelle Gan on: 9/19/2019 11:33 AM     Modules accepted: Kathy out of season (available sept 1 thru apr 2 only)

## 2019-09-19 NOTE — DISCHARGE NOTE PROVIDER - PROVIDER TOKENS
PROVIDER:[TOKEN:[[id number]],FOLLOWUP:[1 week]],PROVIDER:[TOKEN:[[id number]]],PROVIDER:[TOKEN:[[id number]]],PROVIDER:[TOKEN:[[id number]],FOLLOWUP:[1 week]]

## 2019-09-19 NOTE — DISCHARGE NOTE PROVIDER - HOSPITAL COURSE
5 y/o F w/ PMHx ESRD (HD M/T/T/Sat), IDDM, CHF, CAD, ischemic cardiomyopathy presents to the ED  for fever. pt also in DKA, hyperglycemia and now with hyperkalemia as well as DKA; DKA in setting of viral infection/entero virus.    COPD exacerbation/ Viral syndrome;    seen by endo/pulmonary/cardiology/ID; monitored off antibiotics.    adjusted insulin dose; had HD per renal;    follow up with pulmonary appointment on 9/27/19; follow up with endo to adjust diabetic medication as needed,    patient is cleared by MD for discharge home. 5 y/o F w/ PMHx ESRD (on HD) Type 1 DM, CHF, CAD, ischemic cardiomyopathy , COPD presents to the ED  for fever. pt also in DKA, hyperglycemia and now with hyperkalemia as well as DKA; DKA in setting of viral infection/entero virus.    COPD exacerbation/ Viral syndrome;    seen by endo/pulmonary/cardiology/ID; monitored off antibiotics.    adjusted insulin dose; had HD per renal;    follow up with pulmonary appointment on 9/27/19; follow up with Dr brand Reddy on 9/25/19 at 10 am, to adjust diabetic medication as needed,    hemodialysis per Dr. Burger-on Tuesday, Thursday and Saturday. follow up blood work by renal.     patient is cleared by MD for discharge home.

## 2019-09-19 NOTE — PROGRESS NOTE ADULT - PROBLEM SELECTOR PLAN 1
-Test BG ac and hs and 2am every day  -Change Lantus to 8 units q hs for now  -Increase Humalog ac meals to 6 units if BG ac dinner >200s.   -Continue low dose Humalog correction scale ac meals and HS.  -Add low dose Humalog correction scale at 2am  -Pt to f/u with Dr Ley endocrinologist as out pt.   -Plan discussed with pt/team. Pt's family supervises and injects insulin at home since pt doesn't has the ability to manage her DM independently   Contact info: [phone] (24/7). pager [phone]
-Test BG ac and hs and 2am every day  -Continue Lantus 7 units q hs for now  -Increase Humalog ac meals to 4 units. Slow titration since pt is very insulin sensitive.  -Continue low dose Humalog correction scale ac meals and  decrease HS correction to low dose.  -Pt to f/u with Dr Ley endocrinologist as out pt.   -Plan discussed with pt/team. Pt's family supervises and injects insulin at home since pt doesn't has the ability to manage her DM independently   Contact info: [phone] (24/7). pager [phone]
-Test BG ac and hs and 2am every day  -Continue Lantus 7 units q hs for now  -Increase Humalog ac meals to 5 units if BG ac dinner >200s.   -Continue low dose Humalog correction scale ac meals and HS.  -Add low dose Humalog correction scale at 2am  -Pt to f/u with Dr Ley endocrinologist as out pt.   -Plan discussed with pt/team. Pt's family supervises and injects insulin at home since pt doesn't has the ability to manage her DM independently   Contact info: [phone] (24/7). pager [phone]
-test BG AC/HS/2AM  -Increase Lantus 9 units QHS  -c/w Humalog 6 units AC meals for now. If remains elevated today, will continue to titrate up. c/w Humalog 2 units w/HS snack  -c/w Humalog low correction scale AC and Low HS/2AM scale to correct >250mg/dl  Pt to f/u with Dr Ley endocrinologist as out pt.    Pt's family supervises and injects insulin at home since pt doesn't has the ability to manage her DM independently '  Discharge on basaglar 8 units QHS and Humalog 3 units w/meals. May need adjustment up on premeal insulin at home if insulin requirements remain high  -discussed plan w/pt and team  pager: [phone]

## 2019-09-19 NOTE — PROGRESS NOTE ADULT - ASSESSMENT
65 y/o F w/ PMHx ESRD (HD M/T/T/Sat), IDDM, CHF, CAD, ischemic cardiomyopathy presents to the ED BIBA for fever. pt also in DKA, hyperglycemia and now with hyperkalemia as well as DKA    1- esrd  2- hyperkalemia resolved   3- DKA  4- HTN   5- chf      hd 3. 5 hr f 160  2.5  liter fluid removal  2 k bath bfr 400 dfr 600   epogen [id number] U ivp

## 2019-09-19 NOTE — PROGRESS NOTE ADULT - PROBLEM SELECTOR PROBLEM 1
Uncontrolled type 1 diabetes mellitus with ESRD (end-stage renal disease)

## 2019-09-19 NOTE — PROVIDER CONTACT NOTE (OTHER) - ACTION/TREATMENT ORDERED:
Pt educated at bedside for the use of heparin. Pt still refused. MD aware.
KALI Toribio notified, 4 oz of apple juice administered, encourage the pt eat, will recheck BG and continue to monitor pt.

## 2019-09-19 NOTE — DISCHARGE NOTE PROVIDER - NSDCCPCAREPLAN_GEN_ALL_CORE_FT
PRINCIPAL DISCHARGE DIAGNOSIS  Diagnosis: Sepsis  Assessment and Plan of Treatment: likely secondary enterovirus infection;  seen by pulmonary/ID; monitored off antibiotics;  stable      SECONDARY DISCHARGE DIAGNOSES  Diagnosis: DKA, type 1, not at goal  Assessment and Plan of Treatment: DKA, type 1, not at goal  seen by endocrinologist and adjusted insulin;  pt/family educated and are aware of insulin dose, monitor finger stick to dose insulin;  your hemoglobin A1c 8.1; follow up level in 3 months;  follow up with your endocrinologist as scheduled PRINCIPAL DISCHARGE DIAGNOSIS  Diagnosis: Sepsis  Assessment and Plan of Treatment: likely secondary enterovirus infection; copd exacerbation  seen by pulmonary/ID; monitored off antibiotics;  stable;  follow up with pulmonary doctor on 9/27/19 at 11 am-      SECONDARY DISCHARGE DIAGNOSES  Diagnosis: ESRD on dialysis  Assessment and Plan of Treatment: continue hemodialysis as per renal anf blood work by renal team    Diagnosis: DKA, type 1, not at goal  Assessment and Plan of Treatment: DKA, type 1, not at goal  seen by endocrinologist and adjusted insulin;  pt/family educated and are aware of insulin dose, monitor finger stick to dose insulin;  your hemoglobin A1c 8.1; follow up level in 3 months;  follow up with your endocrinologist as scheduled PRINCIPAL DISCHARGE DIAGNOSIS  Diagnosis: Sepsis  Assessment and Plan of Treatment: likely secondary enterovirus infection; copd exacerbation  seen by pulmonary/ID; monitored off antibiotics;  stable;  follow up with pulmonary doctor on 9/27/19 at 11 am-      SECONDARY DISCHARGE DIAGNOSES  Diagnosis: ESRD on dialysis  Assessment and Plan of Treatment: continue hemodialysis as per renal anf blood work by renal team    Diagnosis: DKA, type 1, not at goal  Assessment and Plan of Treatment: DKA, type 1, not at goal  seen by endocrinologist and adjusted insulin;  pt/family educated and are aware of insulin dose, monitor finger stick to dose insulin;  reviewed diet, monitoring finger stick, hyper/hypglycemia  your hemoglobin A1c 8.1; follow up level in 3 months;  follow up with your endocrinologist as scheduled

## 2019-09-19 NOTE — PROGRESS NOTE ADULT - SUBJECTIVE AND OBJECTIVE BOX
[facility]   [phone]  DIALYSIS NOTE  Chief Complaint: ESRD/Ongoing hemodialysis requirement.    24 hour events/subjective:    seen earlier. states sob is improving.   is on TTS hd scheduled and still fluid overloaded         ALLERGIES & MEDICATIONS  --------------------------------------------------------------------------------  Allergies    No Known Allergies    Intolerances      Standing Inpatient Medications  ALBUTerol/ipratropium for Nebulization. 3 milliLiter(s) Nebulizer every 6 hours  aspirin enteric coated 81 milliGRAM(s) Oral daily  atorvastatin 20 milliGRAM(s) Oral at bedtime  buDESOnide    Inhalation Suspension 0.5 milliGRAM(s) Inhalation two times a day  chlorhexidine 4% Liquid 1 Application(s) Topical <User Schedule>  clopidogrel Tablet 75 milliGRAM(s) Oral daily  dextrose 5%. 1000 milliLiter(s) IV Continuous <Continuous>  dextrose 5%. 1000 milliLiter(s) IV Continuous <Continuous>  dextrose 50% Injectable 12.5 Gram(s) IV Push once  dextrose 50% Injectable 25 Gram(s) IV Push once  dextrose 50% Injectable 25 Gram(s) IV Push once  docusate sodium 100 milliGRAM(s) Oral three times a day  heparin  Injectable 5000 Unit(s) SubCutaneous every 12 hours  hydrALAZINE 25 milliGRAM(s) Oral three times a day  insulin glargine Injectable (LANTUS) 9 Unit(s) SubCutaneous at bedtime  insulin lispro (HumaLOG) corrective regimen sliding scale   SubCutaneous three times a day before meals  insulin lispro (HumaLOG) corrective regimen sliding scale   SubCutaneous at bedtime  insulin lispro (HumaLOG) corrective regimen sliding scale   SubCutaneous <User Schedule>  insulin lispro Injectable (HumaLOG) 2 Unit(s) SubCutaneous at bedtime  insulin lispro Injectable (HumaLOG) 6 Unit(s) SubCutaneous three times a day with meals  isosorbide   dinitrate Tablet (ISORDIL) 10 milliGRAM(s) Oral three times a day  metoprolol succinate ER 50 milliGRAM(s) Oral daily  pantoprazole    Tablet 40 milliGRAM(s) Oral before breakfast  sevelamer carbonate 800 milliGRAM(s) Oral three times a day with meals    PRN Inpatient Medications  acetaminophen   Tablet .. 650 milliGRAM(s) Oral every 6 hours PRN  dextrose 40% Gel 15 Gram(s) Oral once PRN  glucagon  Injectable 1 milliGRAM(s) IntraMuscular once PRN  oxyCODONE    5 mG/acetaminophen 325 mG 1 Tablet(s) Oral every 6 hours PRN  senna 2 Tablet(s) Oral at bedtime PRN      REVIEW OF SYSTEMS  --------------------------------------------------------------------------------  no itching or rash  no fever or chill  no cp or palp   no worsening sob or cough   no N/V/D/ no abd pain   ext  +  edema no pain         VITALS/PHYSICAL EXAM  --------------------------------------------------------------------------------  T(C): 36.3 (09-19-19 @ 17:45), Max: 36.9 (09-19-19 @ 05:08)  HR: 84 (09-19-19 @ 17:45) (78 - 114)  BP: 141/71 (09-19-19 @ 17:45) (111/83 - 141/71)  RR: 17 (09-19-19 @ 17:45) (16 - 18)  SpO2: 99% (09-19-19 @ 17:45) (94% - 99%)  Wt(kg): --        09-18-19 @ 07:01  -  09-19-19 @ 07:00  --------------------------------------------------------  IN: 960 mL / OUT: 3000 mL / NET: -2040 mL    09-19-19 @ 07:01  -  09-19-19 @ 19:36  --------------------------------------------------------  IN: 1520 mL / OUT: 3400 mL / NET: -1880 mL      Physical Exam:  		  	Gen: ill appearing   	+ JVD  	Pulm: decrease bs coarse bs with insp wheezing   	CV: RRR, S1S2; no rub  	Abd: +BS, soft, nontender/nondistended  	: No suprapubic tenderness  	UE: Warm, no cyanosis  no clubbing,  no edema  	LE: Warm, no cyanosis  no clubbing, 2+ pitting LLE > RLE  edema  	Neuro: still  lethargic   	Vascular access: + avf + bruit and thrill 	      LABS/STUDIES  --------------------------------------------------------------------------------              8.3    5.35  >-----------<  195      [09-19-19 @ 10:04]              27.1     135  |  92  |  18  ----------------------------<  350      [09-19-19 @ 07:09]  4.2   |  28  |  3.66        Ca     9.5     [09-19-19 @ 07:09]      Mg     2.1     [09-18-19 @ 06:59]      Phos  5.0     [09-18-19 @ 06:59]                    imp/suggest: ESRD      Hemodialysis Prescription:  	Access:  	Dialyzer: revaclear   	Blood Flow (mL/Min): 400  	Dialysate Flow (mL/Min): 600  	Target UF (Liters):  	Treatment Time:  	Potassium:   	Calcium: 2.5  	  YOLANDA    Vitamin D     continue with hd   see hd flow sheet

## 2019-09-19 NOTE — PROGRESS NOTE ADULT - SUBJECTIVE AND OBJECTIVE BOX
Diabetes Follow up note:  Interval Hx:   63y/o female w/h/o uncontrolled TIDM (HbA1c 8.1% 9/19) c/b neuropathy and retinopathy as well as CAD s/p multiple stents, CHF (EF 50% 10/2018), TIA, PVD s/p b/l fem-pop bypass, ESRD> HD, presenting with upper respiratory illness and hyperglycemia. Developed DKA after missing basal insulin. Tolerating POs. Reports good appetite and attributes high sugars to her infection. Hopeful for discharge today after HD.       Review of Systems:  General: Pt reports feeling better overall  GI: Tolerating POs without any N/V/D/ABD PAIN.  Resp: SOB  ENDO: No S&Sx of hypoglycemia  MEDS:  atorvastatin 20 milliGRAM(s) Oral at bedtime  insulin glargine Injectable (LANTUS) 8 Unit(s) SubCutaneous at bedtime  insulin lispro (HumaLOG) corrective regimen sliding scale   SubCutaneous three times a day before meals  insulin lispro (HumaLOG) corrective regimen sliding scale   SubCutaneous at bedtime  insulin lispro (HumaLOG) corrective regimen sliding scale   SubCutaneous <User Schedule>  insulin lispro Injectable (HumaLOG) 2 Unit(s) SubCutaneous at bedtime  insulin lispro Injectable (HumaLOG) 6 Unit(s) SubCutaneous three times a day with meals      Allergies    No Known Allergies      PE:  General: Female sitting in chair. NAD.   Vital Signs Last 24 Hrs  T(C): 36.9 (19 Sep 2019 05:08), Max: 37 (18 Sep 2019 16:33)  T(F): 98.5 (19 Sep 2019 05:08), Max: 98.6 (18 Sep 2019 16:33)  HR: 82 (19 Sep 2019 05:16) (78 - 114)  BP: 132/79 (19 Sep 2019 05:08) (101/60 - 140/75)  BP(mean): --  RR: 18 (19 Sep 2019 05:08) (18 - 20)  SpO2: 94% (19 Sep 2019 05:08) (94% - 99%)  Abd: Soft, NT,ND,   Extremities: Warm. B/L LE edema L>R  Neuro: A&O X3    LABS:    POCT Blood Glucose.: 356 mg/dL (09-19-19 @ 08:39)  POCT Blood Glucose.: 327 mg/dL (09-19-19 @ 01:59)  POCT Blood Glucose.: 272 mg/dL (09-18-19 @ 23:33)  POCT Blood Glucose.: 143 mg/dL (09-18-19 @ 19:37)  POCT Blood Glucose.: 229 mg/dL (09-18-19 @ 17:20)  POCT Blood Glucose.: 283 mg/dL (09-18-19 @ 12:08)  POCT Blood Glucose.: 316 mg/dL (09-18-19 @ 08:34)  POCT Blood Glucose.: 232 mg/dL (09-18-19 @ 01:58)  POCT Blood Glucose.: 368 mg/dL (09-17-19 @ 22:09)  POCT Blood Glucose.: 267 mg/dL (09-17-19 @ 17:35)  POCT Blood Glucose.: 233 mg/dL (09-17-19 @ 11:32)  POCT Blood Glucose.: 352 mg/dL (09-17-19 @ 08:34)  POCT Blood Glucose.: 264 mg/dL (09-17-19 @ 04:00)  POCT Blood Glucose.: 187 mg/dL (09-16-19 @ 21:00)  POCT Blood Glucose.: 297 mg/dL (09-16-19 @ 17:36)  POCT Blood Glucose.: 287 mg/dL (09-16-19 @ 12:52)  POCT Blood Glucose.: 309 mg/dL (09-16-19 @ 10:36)                            8.3    5.35  )-----------( 195      ( 19 Sep 2019 10:04 )             27.1       09-19    135  |  92<L>  |  18  ----------------------------<  350<H>  4.2   |  28  |  3.66<H>    Ca    9.5      19 Sep 2019 07:09  Phos  5.0     09-18  Mg     2.1     09-18          Hemoglobin A1C, Whole Blood: 8.1 % <H> [4.0 - 5.6] (09-16-19 @ 08:04)            Contact number: isabel [phone] or [phone]

## 2019-09-19 NOTE — PROGRESS NOTE ADULT - SUBJECTIVE AND OBJECTIVE BOX
Patient is a 64y old  Female who presents with a chief complaint of sob (19 Sep 2019 12:31)      SUBJECTIVE / OVERNIGHT EVENTS: feels better,   Review of Systems  chest pain no  palpitations no  sob improving   nausea no  headache no    MEDICATIONS  (STANDING):  ALBUTerol/ipratropium for Nebulization. 3 milliLiter(s) Nebulizer every 6 hours  aspirin enteric coated 81 milliGRAM(s) Oral daily  atorvastatin 20 milliGRAM(s) Oral at bedtime  buDESOnide    Inhalation Suspension 0.5 milliGRAM(s) Inhalation two times a day  chlorhexidine 4% Liquid 1 Application(s) Topical <User Schedule>  clopidogrel Tablet 75 milliGRAM(s) Oral daily  dextrose 5%. 1000 milliLiter(s) (50 mL/Hr) IV Continuous <Continuous>  dextrose 5%. 1000 milliLiter(s) (50 mL/Hr) IV Continuous <Continuous>  dextrose 50% Injectable 12.5 Gram(s) IV Push once  dextrose 50% Injectable 25 Gram(s) IV Push once  dextrose 50% Injectable 25 Gram(s) IV Push once  docusate sodium 100 milliGRAM(s) Oral three times a day  heparin  Injectable 5000 Unit(s) SubCutaneous every 12 hours  hydrALAZINE 25 milliGRAM(s) Oral three times a day  insulin glargine Injectable (LANTUS) 9 Unit(s) SubCutaneous at bedtime  insulin lispro (HumaLOG) corrective regimen sliding scale   SubCutaneous three times a day before meals  insulin lispro (HumaLOG) corrective regimen sliding scale   SubCutaneous at bedtime  insulin lispro (HumaLOG) corrective regimen sliding scale   SubCutaneous <User Schedule>  insulin lispro Injectable (HumaLOG) 2 Unit(s) SubCutaneous at bedtime  insulin lispro Injectable (HumaLOG) 6 Unit(s) SubCutaneous three times a day with meals  isosorbide   dinitrate Tablet (ISORDIL) 10 milliGRAM(s) Oral three times a day  metoprolol succinate ER 50 milliGRAM(s) Oral daily  pantoprazole    Tablet 40 milliGRAM(s) Oral before breakfast  sevelamer carbonate 800 milliGRAM(s) Oral three times a day with meals    MEDICATIONS  (PRN):  acetaminophen   Tablet .. 650 milliGRAM(s) Oral every 6 hours PRN Temp greater or equal to 38.5C (101.3F), Mild Pain (1 - 3)  dextrose 40% Gel 15 Gram(s) Oral once PRN Blood Glucose LESS THAN 70 milliGRAM(s)/deciLiter  glucagon  Injectable 1 milliGRAM(s) IntraMuscular once PRN Glucose <70 milliGRAM(s)/deciLiter  oxyCODONE    5 mG/acetaminophen 325 mG 1 Tablet(s) Oral every 6 hours PRN Moderate Pain (4 - 6)  senna 2 Tablet(s) Oral at bedtime PRN Constipation      Vital Signs Last 24 Hrs  T(C): 36.4 (19 Sep 2019 14:10), Max: 36.9 (19 Sep 2019 05:08)  T(F): 97.6 (19 Sep 2019 14:10), Max: 98.5 (19 Sep 2019 05:08)  HR: 86 (19 Sep 2019 14:10) (78 - 114)  BP: 111/83 (19 Sep 2019 14:10) (111/83 - 133/74)  BP(mean): --  RR: 16 (19 Sep 2019 14:10) (16 - 18)  SpO2: 98% (19 Sep 2019 14:10) (94% - 99%)    PHYSICAL EXAM:  GENERAL: NAD  HEAD:  Atraumatic, Normocephalic  EYES: EOMI, PERRLA, conjunctiva and sclera clear  NECK: Supple, No JVD  CHEST/LUNG: few rhonchi to auscultation bilaterally; No wheeze  HEART: Regular rate and rhythm; No murmurs, rubs, or gallops  ABDOMEN: Soft, Nontender, Nondistended; Bowel sounds present  EXTREMITIES:  2+ Peripheral Pulses, No clubbing, cyanosis, or edema  PSYCH: Anxious  NEUROLOGY: non-focal  SKIN: No rashes or lesions    LABS:                        8.3    5.35  )-----------( 195      ( 19 Sep 2019 10:04 )             27.1     09-19    135  |  92<L>  |  18  ----------------------------<  350<H>  4.2   |  28  |  3.66<H>    Ca    9.5      19 Sep 2019 07:09  Phos  5.0     09-18  Mg     2.1     09-18                  RADIOLOGY & ADDITIONAL TESTS:    Imaging Personally Reviewed:    Consultant(s) Notes Reviewed:      Care Discussed with Consultants/Other Providers:

## 2019-09-19 NOTE — PROGRESS NOTE ADULT - ASSESSMENT
64 f with    COPD exacerbation/ Viral syndrome  - nebs  - pulmonary follow  - ID evaluation noted    ESRD  - HD  - Nephrology follow    Hyperkalemia  - follow  -nephrology follow    IDDM type 1/ DKA  - BS control  - Endocrine follow    CAD  - continue Rx    Acute on Chronic Systolic Congestive Heart Failure  - HD  - Cardiology follow    Anxiety/ Depression  - stable    DCP home Arrangements for nebs at home.    d/w patient  Pierce Viera MD pager [phone]

## 2019-09-19 NOTE — DISCHARGE NOTE PROVIDER - NSDCFUADDINST_GEN_ALL_CORE_FT
activity as tolerated activity as tolerated;  hemodialysis per renal doctor on T  appointment with Dr. Ley , endocrinologist on 9/25/19 at 10 AM;  appointment with pul monary doctor on 9/27/19 at 11 am activity as tolerated; home care  hemodialysis per renal doctor on T  appointment with Dr. Ley , endocrinologist on 9/25/19 at 10 AM;  appointment with pulmonary doctor on 9/27/19 at 11 am

## 2019-09-19 NOTE — DISCHARGE NOTE PROVIDER - CARE PROVIDER_API CALL
Daisy Burger (DO)  Nephrology  [address] [facility] [address]  [place] [address]  Phone: [phone]  Fax: [phone]  Follow Up Time: 1 week    Tee Biswas)  Cardiovascular Disease; Internal Medicine  [address] [place] [address]  Phone: [phone]  Fax: [phone]  Follow Up Time:     Pina Ley)  EndocrinologyMetabDiabetes  [address] [place] [address]  Phone: [phone]  Fax: [phone]  Follow Up Time:     Braden Thompson (DO)  Internal Medicine; Pulmonary Disease  [address] [facility], [address]  [place] [address]  Phone: [phone]  Fax: [phone]  Follow Up Time: 1 week Daisy Burger (DO)  Nephrology  [address] [facility] [address]  [place] [address]  Phone: [phone]  Fax: [phone]  Follow Up Time: 1 week    Tee Biswas)  Cardiovascular Disease; Internal Medicine  [address] [place] [address]  Phone: [phone]  Fax: [phone]  Follow Up Time:     Pina Ley)  EndocrinologyMetabDiabetes  [address] [place] [address]  Phone: [phone]  Fax: [phone]  Follow Up Time:     Braden Thompson (DO)  Internal Medicine; Pulmonary Disease  [address] [facility], [address]  [place] [address]  Phone: [phone]  Fax: [phone]  Follow Up Time: 1 week

## 2019-09-19 NOTE — PROGRESS NOTE ADULT - ASSESSMENT
65y/o female w/h/o uncontrolled TIDM (HbA1c 8.1% 9/19) c/b neuropathy and retinopathy as well as CAD s/p multiple stents, CHF (EF 50% 10/2018), TIA, PVD s/p b/l fem-pop bypass, ESRD> HD, presenting with upper respiratory illness and hyperglycemia. Developed DKA after missing basal insulin. Feeling better and tolerating POs with on and off nutritional indiscretions. Remains hyperglycemic while on present insulin doses. Noted that pt ate during night but did not receive rapid acting insulin as ordered at bedtime. Will continue to increase insulin doses slowly.  BG goal 100s to low 200s on this pt who has multiple DM complications and comorbidities.

## 2019-09-19 NOTE — CHART NOTE - NSCHARTNOTEFT_GEN_A_CORE
Medicine PA Note    Name: NATHAN MEEKS  MRN: [id number]  Age: 64y Gender: Female    CC: HyPOglycemia    Called by RN to evaluate patient for hypoglycemia (BG 63). Patient was seen and examined by me at the bedside. Patient was in no acute distress and non-toxic in appearance. Patient was asymptomatic. She endorses eating her meals today. Patient denies the presence of chest pain, palpitations, cough, dyspnea, diaphoresis, dizziness/lightheadedness, N/V, HA, and/or visual changes.     Vital Signs Last 24 Hrs  T(C): 36.6 (19 Sep 2019 21:51), Max: 36.9 (19 Sep 2019 05:08)  T(F): 97.8 (19 Sep 2019 21:51), Max: 98.5 (19 Sep 2019 05:08)  HR: 88 (19 Sep 2019 21:51) (82 - 114)  BP: 130/70 (19 Sep 2019 21:51) (106/64 - 141/71)  RR: 18 (19 Sep 2019 21:51) (16 - 18)  SpO2: 95% (19 Sep 2019 21:51) (94% - 99%)                        8.3    5.35  )-----------( 195      ( 19 Sep 2019 10:04 )             27.1     09-19    135  |  92<L>  |  18  ----------------------------<  350<H>  4.2   |  28  |  3.66<H>    Ca    9.5      19 Sep 2019 07:09  Phos  5.0     09-18  Mg     2.1     09-18    PHYSICAL EXAM:  GENERAL: A well-developed thin female in no acute distress. Non-toxic appearing. Speaking in full sentences.  HEAD:  Atraumatic and normocephalic  EYES: EOMI, PERRLA, conjunctiva and sclera clear  CHEST/LUNG: Clear to auscultation bilaterally. No wheezing, rhonchi, or rales. Chest expansion symmetrical. No accessory muscle use.  HEART: Regular rate and rhythm. No murmurs, rubs, or gallops. No palpable thrill.  ABDOMEN: Soft, nontender, and nondistended. Bowel sounds present in all 4 quadrants.  EXTREMITIES:  2+ peripheral pulses. No clubbing, cyanosis, or edema.  PSYCH: AAOx3. Appropriate/Flat affect.  NEUROLOGY: No focal deficits    ASSESSMENT/PLAN:   63 y/o F w/ PMHx ESRD (HD M/T/T/Sat), IDDM, CHF, CAD, ischemic cardiomyopathy presents to the ED BIBA for fever and AMS. She also reports a productive cough w/ yellow sputum for "days". She otherwise had her full course of dialysis today and reports chills at that time. (15 Sep 2019 10:16)      #Hypoglycemia  -   - Decreased PM Lantus  - Recheck BG at 2AM    Will continue to monitor closely and endorse to primary team in AM.    Pooja Toribio PA-C  Department of Medicine   [facility] Medicine PA Note    Name: NATHAN MEEKS  MRN: [id number]  Age: 64y Gender: Female    CC: Hypoglycemia    Called by RN to evaluate patient for hypoglycemia (BG 63). Patient was seen and examined by me at the bedside. Patient was in no acute distress and non-toxic in appearance. Patient was asymptomatic. She endorses adequate PO intake. Patient denies the presence of chest pain, palpitations, cough, dyspnea, diaphoresis, dizziness/lightheadedness, N/V, HA, and/or visual changes. Patient went to dialysis today,    Vital Signs Last 24 Hrs  T(C): 36.6 (19 Sep 2019 21:51), Max: 36.9 (19 Sep 2019 05:08)  T(F): 97.8 (19 Sep 2019 21:51), Max: 98.5 (19 Sep 2019 05:08)  HR: 88 (19 Sep 2019 21:51) (82 - 114)  BP: 130/70 (19 Sep 2019 21:51) (106/64 - 141/71)  RR: 18 (19 Sep 2019 21:51) (16 - 18)  SpO2: 95% (19 Sep 2019 21:51) (94% - 99%)                        8.3    5.35  )-----------( 195      ( 19 Sep 2019 10:04 )             27.1     09-19    135  |  92<L>  |  18  ----------------------------<  350<H>  4.2   |  28  |  3.66<H>    Ca    9.5      19 Sep 2019 07:09  Phos  5.0     09-18  Mg     2.1     09-18    PHYSICAL EXAM:  GENERAL: A well-developed thin female in no acute distress. Non-toxic appearing. Speaking in full sentences.  HEAD:  Atraumatic and normocephalic  EYES: EOMI, PERRLA, conjunctiva and sclera clear  CHEST/LUNG: Clear to auscultation bilaterally. No wheezing, rhonchi, or rales. Chest expansion symmetrical. No accessory muscle use.  HEART: Regular rate and rhythm. No murmurs, rubs, or gallops. No palpable thrill.  ABDOMEN: Soft, nontender, and nondistended. Bowel sounds present in all 4 quadrants.  EXTREMITIES:  2+ peripheral pulses. No clubbing, cyanosis, or edema.  PSYCH: AAOx3. Appropriate/Flat affect.  NEUROLOGY: No focal deficits    ASSESSMENT/PLAN:   65 y/o F w/ PMHx ESRD (HD M/T/T/Sat), IDDM, CHF, CAD, ischemic cardiomyopathy presents to the ED BIBA for fever and AMS. She also reports a productive cough w/ yellow sputum for "days". She otherwise had her full course of dialysis today and reports chills at that time. Patient was in the MICU for DKA and now presents with hypoglycemia.    #Hypoglycemia  - BG increased to 119 after intervention  - Decreased PM Lantus to 6U which is 75% of what she had last night   - Recheck BG at 2AM    Will continue to monitor closely and endorse to primary team in AM.    Pooja Toribio PA-C  Department of Medicine   [facility] Medicine PA Note    Name: NATHAN MEEKS  MRN: [id number]  Age: 64y Gender: Female    CC: Hypoglycemia    Called by RN to evaluate patient for hypoglycemia (BG 63). Patient was seen and examined by me at the bedside. Patient was in no acute distress and non-toxic in appearance. Patient was asymptomatic. She endorses adequate PO intake. Patient denies the presence of chest pain, palpitations, cough, dyspnea, diaphoresis, dizziness/lightheadedness, N/V, HA, and/or visual changes. Patient went to dialysis today,    Vital Signs Last 24 Hrs  T(C): 36.6 (19 Sep 2019 21:51), Max: 36.9 (19 Sep 2019 05:08)  T(F): 97.8 (19 Sep 2019 21:51), Max: 98.5 (19 Sep 2019 05:08)  HR: 88 (19 Sep 2019 21:51) (82 - 114)  BP: 130/70 (19 Sep 2019 21:51) (106/64 - 141/71)  RR: 18 (19 Sep 2019 21:51) (16 - 18)  SpO2: 95% (19 Sep 2019 21:51) (94% - 99%)                        8.3    5.35  )-----------( 195      ( 19 Sep 2019 10:04 )             27.1     09-19    135  |  92<L>  |  18  ----------------------------<  350<H>  4.2   |  28  |  3.66<H>    Ca    9.5      19 Sep 2019 07:09  Phos  5.0     09-18  Mg     2.1     09-18    PHYSICAL EXAM:  GENERAL: A well-developed thin female in no acute distress. Non-toxic appearing. Speaking in full sentences.  HEAD:  Atraumatic and normocephalic  EYES: EOMI, PERRLA, conjunctiva and sclera clear  CHEST/LUNG: Clear to auscultation bilaterally. No wheezing, rhonchi, or rales. Chest expansion symmetrical. No accessory muscle use.  HEART: Regular rate and rhythm. No murmurs, rubs, or gallops. No palpable thrill.  ABDOMEN: Soft, nontender, and nondistended. Bowel sounds present in all 4 quadrants.  EXTREMITIES:  2+ peripheral pulses. No clubbing, cyanosis, or edema.  PSYCH: AAOx3. Appropriate affect.  NEUROLOGY: No focal deficits    ASSESSMENT/PLAN:   65 y/o F w/ PMHx ESRD (HD M/T/T/Sat), IDDM, CHF, CAD, ischemic cardiomyopathy presents to the ED BIBA for fever and AMS. She also reports a productive cough w/ yellow sputum for "days". She otherwise had her full course of dialysis and reports chills at that time. Patient was in the MICU for DKA and now presents with hypoglycemia.    #Hypoglycemia  - BG increased to 119 after intervention  - Decreased PM Lantus to 6U which is 75% of what she had last night   - Recheck BG at 2AM    Will continue to monitor closely and endorse to primary team in AM.    Pooja Toribio PA-C  Department of Medicine   [facility]

## 2019-09-20 ENCOUNTER — TRANSCRIPTION ENCOUNTER (OUTPATIENT)
Age: 64
End: 2019-09-20

## 2019-09-20 VITALS
HEART RATE: 88 BPM | OXYGEN SATURATION: 96 % | RESPIRATION RATE: 18 BRPM | SYSTOLIC BLOOD PRESSURE: 144 MMHG | TEMPERATURE: 98 F | DIASTOLIC BLOOD PRESSURE: 76 MMHG

## 2019-09-20 LAB
CULTURE RESULTS: SIGNIFICANT CHANGE UP
CULTURE RESULTS: SIGNIFICANT CHANGE UP
GLUCOSE BLDC GLUCOMTR-MCNC: 206 MG/DL — HIGH (ref 70–99)
GLUCOSE BLDC GLUCOMTR-MCNC: 225 MG/DL — HIGH (ref 70–99)
GLUCOSE BLDC GLUCOMTR-MCNC: 287 MG/DL — HIGH (ref 70–99)
SPECIMEN SOURCE: SIGNIFICANT CHANGE UP
SPECIMEN SOURCE: SIGNIFICANT CHANGE UP

## 2019-09-20 RX ORDER — INSULIN GLARGINE 100 [IU]/ML
8 INJECTION, SOLUTION SUBCUTANEOUS
Qty: 0 | Refills: 0 | DISCHARGE

## 2019-09-20 RX ADMIN — ISOSORBIDE DINITRATE 10 MILLIGRAM(S): 5 TABLET ORAL at 06:04

## 2019-09-20 RX ADMIN — SEVELAMER CARBONATE 800 MILLIGRAM(S): 2400 POWDER, FOR SUSPENSION ORAL at 12:44

## 2019-09-20 RX ADMIN — ISOSORBIDE DINITRATE 10 MILLIGRAM(S): 5 TABLET ORAL at 12:08

## 2019-09-20 RX ADMIN — Medication 25 MILLIGRAM(S): at 12:08

## 2019-09-20 RX ADMIN — CLOPIDOGREL BISULFATE 75 MILLIGRAM(S): 75 TABLET, FILM COATED ORAL at 12:08

## 2019-09-20 RX ADMIN — Medication 3 MILLILITER(S): at 12:08

## 2019-09-20 RX ADMIN — Medication 6 UNIT(S): at 12:45

## 2019-09-20 RX ADMIN — Medication 3 MILLILITER(S): at 06:04

## 2019-09-20 RX ADMIN — PANTOPRAZOLE SODIUM 40 MILLIGRAM(S): 20 TABLET, DELAYED RELEASE ORAL at 06:04

## 2019-09-20 RX ADMIN — OXYCODONE AND ACETAMINOPHEN 1 TABLET(S): 5; 325 TABLET ORAL at 11:00

## 2019-09-20 RX ADMIN — CHLORHEXIDINE GLUCONATE 1 APPLICATION(S): 213 SOLUTION TOPICAL at 09:22

## 2019-09-20 RX ADMIN — SEVELAMER CARBONATE 800 MILLIGRAM(S): 2400 POWDER, FOR SUSPENSION ORAL at 09:22

## 2019-09-20 RX ADMIN — Medication 25 MILLIGRAM(S): at 06:04

## 2019-09-20 RX ADMIN — Medication 50 MILLIGRAM(S): at 06:04

## 2019-09-20 RX ADMIN — Medication 100 MILLIGRAM(S): at 12:08

## 2019-09-20 RX ADMIN — Medication 3 MILLILITER(S): at 00:17

## 2019-09-20 RX ADMIN — Medication 0.5 MILLIGRAM(S): at 09:11

## 2019-09-20 RX ADMIN — Medication 81 MILLIGRAM(S): at 12:07

## 2019-09-20 RX ADMIN — Medication 3: at 09:10

## 2019-09-20 RX ADMIN — OXYCODONE AND ACETAMINOPHEN 1 TABLET(S): 5; 325 TABLET ORAL at 10:03

## 2019-09-20 RX ADMIN — Medication 6 UNIT(S): at 09:09

## 2019-09-20 RX ADMIN — INSULIN GLARGINE 6 UNIT(S): 100 INJECTION, SOLUTION SUBCUTANEOUS at 00:16

## 2019-09-20 RX ADMIN — Medication 2: at 12:45

## 2019-09-20 NOTE — PROVIDER CONTACT NOTE (MEDICATION) - ACTION/TREATMENT ORDERED:
Recheck BS in 15 minutes, monitor for s/s of hypoglycemia, follow protocol, and continue to monitor pt.  Will follow up on Lantus with KALI Toribio.

## 2019-09-20 NOTE — PROGRESS NOTE ADULT - ASSESSMENT
64 f with    COPD exacerbation/ Viral syndrome  - nebs  - pulmonary follow  - ID evaluation noted    ESRD  - HD  - Nephrology follow    Hyperkalemia  - follow  -nephrology follow    IDDM type 1  - DKA resolved   - BS control  - Endocrine follow    CAD  - continue Rx    Acute on Chronic Systolic Congestive Heart Failure  - HD  - Cardiology follow    Anxiety/ Depression  - stable    DC home. Follow with PMD/ Nephrology / Endocrine/  Cardiology in 3-4 days .    d/w patient  Pierce Viera MD pager [phone]

## 2019-09-20 NOTE — PROGRESS NOTE ADULT - SUBJECTIVE AND OBJECTIVE BOX
Cardiovascular Disease Progress Note    Overnight events: No acute events overnight.  sob improved. awaiting dispo planning. no new cardiac sx  Otherwise review of systems negative    Objective Findings:  T(C): 36.6 (19 @ 21:51), Max: 36.8 (19 @ 19:52)  HR: 84 (19 @ 06:00) (84 - 94)  BP: 144/79 (19 @ 06:00) (106/64 - 144/79)  RR: 18 (19 @ 06:00) (16 - 18)  SpO2: 95% (19 @ 06:00) (94% - 99%)  Wt(kg): --  Daily     Daily Weight in k.1 (19 Sep 2019 17:45)      Physical Exam:  Gen: NAD  HEENT: EOMI  CV: RRR, normal S1 + S2, no m/r/g  Lungs: CTAB  Abd: soft, non-tender  Ext: No edema      Laboratory Data:                        8.3    5.35  )-----------( 195      ( 19 Sep 2019 10:04 )             27.1         135  |  92<L>  |  18  ----------------------------<  350<H>  4.2   |  28  |  3.66<H>    Ca    9.5      19 Sep 2019 07:09                Inpatient Medications:  MEDICATIONS  (STANDING):  ALBUTerol/ipratropium for Nebulization. 3 milliLiter(s) Nebulizer every 6 hours  aspirin enteric coated 81 milliGRAM(s) Oral daily  atorvastatin 20 milliGRAM(s) Oral at bedtime  buDESOnide    Inhalation Suspension 0.5 milliGRAM(s) Inhalation two times a day  chlorhexidine 4% Liquid 1 Application(s) Topical <User Schedule>  clopidogrel Tablet 75 milliGRAM(s) Oral daily  dextrose 5%. 1000 milliLiter(s) (50 mL/Hr) IV Continuous <Continuous>  dextrose 5%. 1000 milliLiter(s) (50 mL/Hr) IV Continuous <Continuous>  dextrose 50% Injectable 12.5 Gram(s) IV Push once  dextrose 50% Injectable 25 Gram(s) IV Push once  dextrose 50% Injectable 25 Gram(s) IV Push once  docusate sodium 100 milliGRAM(s) Oral three times a day  heparin  Injectable 5000 Unit(s) SubCutaneous every 12 hours  hydrALAZINE 25 milliGRAM(s) Oral three times a day  insulin glargine Injectable (LANTUS) 9 Unit(s) SubCutaneous at bedtime  insulin lispro (HumaLOG) corrective regimen sliding scale   SubCutaneous three times a day before meals  insulin lispro (HumaLOG) corrective regimen sliding scale   SubCutaneous at bedtime  insulin lispro (HumaLOG) corrective regimen sliding scale   SubCutaneous <User Schedule>  insulin lispro Injectable (HumaLOG) 2 Unit(s) SubCutaneous at bedtime  insulin lispro Injectable (HumaLOG) 6 Unit(s) SubCutaneous three times a day with meals  isosorbide   dinitrate Tablet (ISORDIL) 10 milliGRAM(s) Oral three times a day  metoprolol succinate ER 50 milliGRAM(s) Oral daily  pantoprazole    Tablet 40 milliGRAM(s) Oral before breakfast  sevelamer carbonate 800 milliGRAM(s) Oral three times a day with meals      Assessment:  -DKA  -upper respiratory infection; RVP +  -elevated cardiac enzymes (hs trop) with relatively preserved ck/ck mb fraction and no obvious ischemic changes on ekg  -chest pain with mild ekg changes and stable hs trop  -NSVT  -pAT  -volume overload  -ischemic cardiomyopathy, cad s/p multiple stents  -esrd on hd  -PAD s/p prior intervention         Recs:  -c/w supportive care for viral URI  -known extensive CAD and ICM. s/p [facility] 2019 --> severe and diffuse instent restenosis along RCA --> unable to stent due to multiple layers of stenting and prior brachytherapy  -will consider complex intervention if true ischemic and refractory sx develop   -c/w beta blockers for nsvt/pat/cad (as above). currently on toprol 50mg, isordil 10mg tid, hydral 25mg tid. uptitrate GDMT as tolerates  -hx of prolonged qtc. avoid qtc prolonging meds  -s/p TTE 2019: mod-severe MR, pseudo AS (low flow-low gradient), mod-severe LV dysfunction --> recent CTS consult with dr anup donaldson. plan is for medical management given surgical risk and patient preference  -c/w asa, plavix, statin for ischemic cardiomyopathy/CAD/PAD  -dvt ppx  -dispo planning        Over 25 minutes spent on total encounter; more than 50% of the visit was spent counseling and/or coordinating care by the attending physician.      Julián Biswas MD   Cardiovascular Disease  [phone]

## 2019-09-20 NOTE — DISCHARGE NOTE NURSING/CASE MANAGEMENT/SOCIAL WORK - PATIENT PORTAL LINK FT
You can access the FollowMyHealth Patient Portal offered by [facility] by registering at the following website: [email or web address]. By joining [employer]’s FollowMyHealth portal, you will also be able to view your health information using other applications (apps) compatible with our system.

## 2019-09-20 NOTE — CHART NOTE - NSCHARTNOTEFT_GEN_A_CORE
follow up- patient is cleared by MD for discharge home with home care; discussed discharge medication/follow up.  Mary Green(NP)  [facility], [phone]

## 2019-09-20 NOTE — PROGRESS NOTE ADULT - SUBJECTIVE AND OBJECTIVE BOX
Patient is a 64y old  Female who presents with a chief complaint of sob (20 Sep 2019 08:20)      SUBJECTIVE / OVERNIGHT EVENTS: feels better. Wants to go home.  Review of Systems  chest pain no  palpitations no  sob no  nausea no  headache no    MEDICATIONS  (STANDING):  ALBUTerol/ipratropium for Nebulization. 3 milliLiter(s) Nebulizer every 6 hours  aspirin enteric coated 81 milliGRAM(s) Oral daily  atorvastatin 20 milliGRAM(s) Oral at bedtime  buDESOnide    Inhalation Suspension 0.5 milliGRAM(s) Inhalation two times a day  chlorhexidine 4% Liquid 1 Application(s) Topical <User Schedule>  clopidogrel Tablet 75 milliGRAM(s) Oral daily  dextrose 5%. 1000 milliLiter(s) (50 mL/Hr) IV Continuous <Continuous>  dextrose 5%. 1000 milliLiter(s) (50 mL/Hr) IV Continuous <Continuous>  dextrose 50% Injectable 12.5 Gram(s) IV Push once  dextrose 50% Injectable 25 Gram(s) IV Push once  dextrose 50% Injectable 25 Gram(s) IV Push once  docusate sodium 100 milliGRAM(s) Oral three times a day  heparin  Injectable 5000 Unit(s) SubCutaneous every 12 hours  hydrALAZINE 25 milliGRAM(s) Oral three times a day  insulin glargine Injectable (LANTUS) 9 Unit(s) SubCutaneous at bedtime  insulin lispro (HumaLOG) corrective regimen sliding scale   SubCutaneous three times a day before meals  insulin lispro (HumaLOG) corrective regimen sliding scale   SubCutaneous at bedtime  insulin lispro (HumaLOG) corrective regimen sliding scale   SubCutaneous <User Schedule>  insulin lispro Injectable (HumaLOG) 2 Unit(s) SubCutaneous at bedtime  insulin lispro Injectable (HumaLOG) 6 Unit(s) SubCutaneous three times a day with meals  isosorbide   dinitrate Tablet (ISORDIL) 10 milliGRAM(s) Oral three times a day  metoprolol succinate ER 50 milliGRAM(s) Oral daily  pantoprazole    Tablet 40 milliGRAM(s) Oral before breakfast  sevelamer carbonate 800 milliGRAM(s) Oral three times a day with meals    MEDICATIONS  (PRN):  acetaminophen   Tablet .. 650 milliGRAM(s) Oral every 6 hours PRN Temp greater or equal to 38.5C (101.3F), Mild Pain (1 - 3)  dextrose 40% Gel 15 Gram(s) Oral once PRN Blood Glucose LESS THAN 70 milliGRAM(s)/deciLiter  glucagon  Injectable 1 milliGRAM(s) IntraMuscular once PRN Glucose <70 milliGRAM(s)/deciLiter  oxyCODONE    5 mG/acetaminophen 325 mG 1 Tablet(s) Oral every 6 hours PRN Moderate Pain (4 - 6)  senna 2 Tablet(s) Oral at bedtime PRN Constipation      Vital Signs Last 24 Hrs  T(C): 36.4 (20 Sep 2019 11:33), Max: 36.8 (19 Sep 2019 19:52)  T(F): 97.5 (20 Sep 2019 11:33), Max: 98.3 (19 Sep 2019 19:52)  HR: 88 (20 Sep 2019 11:33) (84 - 94)  BP: 144/76 (20 Sep 2019 11:33) (106/64 - 144/79)  BP(mean): --  RR: 18 (20 Sep 2019 11:33) (16 - 18)  SpO2: 96% (20 Sep 2019 11:33) (94% - 99%)    PHYSICAL EXAM:  GENERAL: NAD  HEAD:  Atraumatic, Normocephalic  EYES: EOMI, PERRLA, conjunctiva and sclera clear  NECK: Supple, No JVD  CHEST/LUNG: Clear to auscultation bilaterally; No wheeze  HEART: Regular rate and rhythm; No murmurs, rubs, or gallops  ABDOMEN: Soft, Nontender, Nondistended; Bowel sounds present  EXTREMITIES:  2+ Peripheral Pulses, No clubbing, cyanosis, or edema  PSYCH: AAOx3  NEUROLOGY: non-focal  SKIN: No rashes or lesions    LABS:                        8.3    5.35  )-----------( 195      ( 19 Sep 2019 10:04 )             27.1     09-19    135  |  92<L>  |  18  ----------------------------<  350<H>  4.2   |  28  |  3.66<H>    Ca    9.5      19 Sep 2019 07:09                  RADIOLOGY & ADDITIONAL TESTS:    Imaging Personally Reviewed:    Consultant(s) Notes Reviewed:      Care Discussed with Consultants/Other Providers:

## 2019-09-20 NOTE — PROGRESS NOTE ADULT - PROVIDER SPECIALTY LIST ADULT
Cardiology
Cardiology
Endocrinology
Endocrinology
Infectious Disease
Infectious Disease
Internal Medicine
Internal Medicine
MICU
MICU
Nephrology
Pulmonology
Pulmonology
Cardiology
Endocrinology
Nephrology
Internal Medicine
Endocrinology

## 2019-09-20 NOTE — PROGRESS NOTE ADULT - ASSESSMENT
65 y/o F w/ PMHx ESRD (HD M/T/T/Sat), IDDM, CHF, CAD, ischemic cardiomyopathy presents to the ED BIBA for fever. pt also in DKA, hyperglycemia and now with hyperkalemia as well as DKA    1- esrd  2- hyperkalemia resolved   3- DKA  4- HTN   5- chf      hd  am   epogen 05861 U ivp   hydralazine 25 mg tid   renvela one tab with meals for shpt

## 2019-09-20 NOTE — PROGRESS NOTE ADULT - SUBJECTIVE AND OBJECTIVE BOX
[facility]   [phone]  RENAL FOLLOW UP NOTE  --------------------------------------------------------------------------------  Chief Complaint:    24 hour events/subjective:    seen earlier. eating soup.   states had hypoglycemia episode.   no c/o when seen     PAST HISTORY  --------------------------------------------------------------------------------  No significant changes to PMH, PSH, FHx, SHx, unless otherwise noted    ALLERGIES & MEDICATIONS  --------------------------------------------------------------------------------  Allergies    No Known Allergies    Intolerances      Standing Inpatient Medications  ALBUTerol/ipratropium for Nebulization. 3 milliLiter(s) Nebulizer every 6 hours  aspirin enteric coated 81 milliGRAM(s) Oral daily  atorvastatin 20 milliGRAM(s) Oral at bedtime  buDESOnide    Inhalation Suspension 0.5 milliGRAM(s) Inhalation two times a day  chlorhexidine 4% Liquid 1 Application(s) Topical <User Schedule>  clopidogrel Tablet 75 milliGRAM(s) Oral daily  dextrose 5%. 1000 milliLiter(s) IV Continuous <Continuous>  dextrose 5%. 1000 milliLiter(s) IV Continuous <Continuous>  dextrose 50% Injectable 12.5 Gram(s) IV Push once  dextrose 50% Injectable 25 Gram(s) IV Push once  dextrose 50% Injectable 25 Gram(s) IV Push once  docusate sodium 100 milliGRAM(s) Oral three times a day  heparin  Injectable 5000 Unit(s) SubCutaneous every 12 hours  hydrALAZINE 25 milliGRAM(s) Oral three times a day  insulin glargine Injectable (LANTUS) 9 Unit(s) SubCutaneous at bedtime  insulin lispro (HumaLOG) corrective regimen sliding scale   SubCutaneous three times a day before meals  insulin lispro (HumaLOG) corrective regimen sliding scale   SubCutaneous at bedtime  insulin lispro (HumaLOG) corrective regimen sliding scale   SubCutaneous <User Schedule>  insulin lispro Injectable (HumaLOG) 2 Unit(s) SubCutaneous at bedtime  insulin lispro Injectable (HumaLOG) 6 Unit(s) SubCutaneous three times a day with meals  isosorbide   dinitrate Tablet (ISORDIL) 10 milliGRAM(s) Oral three times a day  metoprolol succinate ER 50 milliGRAM(s) Oral daily  pantoprazole    Tablet 40 milliGRAM(s) Oral before breakfast  sevelamer carbonate 800 milliGRAM(s) Oral three times a day with meals    PRN Inpatient Medications  acetaminophen   Tablet .. 650 milliGRAM(s) Oral every 6 hours PRN  dextrose 40% Gel 15 Gram(s) Oral once PRN  glucagon  Injectable 1 milliGRAM(s) IntraMuscular once PRN  oxyCODONE    5 mG/acetaminophen 325 mG 1 Tablet(s) Oral every 6 hours PRN  senna 2 Tablet(s) Oral at bedtime PRN      REVIEW OF SYSTEMS  --------------------------------------------------------------------------------    Gen: denies fevers/chills,  CVS: denies chest pain/palpitations  Resp: denies SOB/Cough  GI: Denies N/V/Abd pain  : Denies dysuria/oliguria/hematuria    All other systems were reviewed and are negative, except as noted.    VITALS/PHYSICAL EXAM  --------------------------------------------------------------------------------  T(C): 36.4 (09-20-19 @ 11:33), Max: 36.8 (09-19-19 @ 19:52)  HR: 88 (09-20-19 @ 11:33) (84 - 89)  BP: 144/76 (09-20-19 @ 11:33) (106/64 - 144/79)  RR: 18 (09-20-19 @ 11:33) (16 - 18)  SpO2: 96% (09-20-19 @ 11:33) (95% - 99%)  Wt(kg): --        09-19-19 @ 07:01  -  09-20-19 @ 07:00  --------------------------------------------------------  IN: 2000 mL / OUT: 3600 mL / NET: -1600 mL    09-20-19 @ 07:01  -  09-20-19 @ 12:09  --------------------------------------------------------  IN: 360 mL / OUT: 0 mL / NET: 360 mL      Physical Exam:  	  	Gen: chronically  ill appearing   	+ JVD  	Pulm: decrease bs coarse bs with insp wheezing   	CV: RRR, S1S2; no rub  	Abd: +BS, soft, nontender/nondistended  	: No suprapubic tenderness  	UE: Warm, no cyanosis  no clubbing,  no edema  	LE: Warm, no cyanosis  no clubbing, 2+ pitting LLE > RLE  edema  	Neuro: alert and oriented   	Vascular access: + avf + bruit and thrill 	      LABS/STUDIES  --------------------------------------------------------------------------------              8.3    5.35  >-----------<  195      [09-19-19 @ 10:04]              27.1     135  |  92  |  18  ----------------------------<  350      [09-19-19 @ 07:09]  4.2   |  28  |  3.66        Ca     9.5     [09-19-19 @ 07:09]            Creatinine Trend:  SCr 3.66 [09-19 @ 07:09]  SCr 4.84 [09-18 @ 06:59]  SCr 2.87 [09-17 @ 00:43]  SCr 3.91 [09-16 @ 05:52]  SCr 3.51 [09-16 @ 02:21]                  Iron 42, TIBC 253, %sat 17      [06-17-19 @ 17:54]  Ferritin 1838      [06-17-19 @ 18:06]  PTH -- (Ca 9.6)      [06-17-19 @ 18:06]   177  PTH -- (Ca 7.8)      [03-29-19 @ 20:38]   73  PTH -- (Ca 7.3)      [02-22-19 @ 02:49]   59  HbA1c 8.1      [09-16-19 @ 08:04]  TSH 0.71      [11-17-18 @ 08:25]

## 2019-09-21 ENCOUNTER — INPATIENT (INPATIENT)
Facility: HOSPITAL | Age: 64
LOS: 8 days | Discharge: HOME | DRG: 193 | End: 2019-09-30
Attending: INTERNAL MEDICINE | Admitting: INTERNAL MEDICINE
Payer: MEDICARE

## 2019-09-21 VITALS
WEIGHT: 119.93 LBS | RESPIRATION RATE: 18 BRPM | OXYGEN SATURATION: 98 % | SYSTOLIC BLOOD PRESSURE: 142 MMHG | TEMPERATURE: 98 F | HEART RATE: 86 BPM | HEIGHT: 62 IN | DIASTOLIC BLOOD PRESSURE: 87 MMHG

## 2019-09-21 DIAGNOSIS — R06.00 DYSPNEA, UNSPECIFIED: ICD-10-CM

## 2019-09-21 DIAGNOSIS — Z98.89 OTHER SPECIFIED POSTPROCEDURAL STATES: Chronic | ICD-10-CM

## 2019-09-21 LAB
ALBUMIN SERPL ELPH-MCNC: 4.4 G/DL — SIGNIFICANT CHANGE UP (ref 3.3–5)
ALP SERPL-CCNC: 155 U/L — HIGH (ref 40–120)
ALT FLD-CCNC: 19 U/L — SIGNIFICANT CHANGE UP (ref 10–45)
ANION GAP SERPL CALC-SCNC: 16 MMOL/L — SIGNIFICANT CHANGE UP (ref 5–17)
AST SERPL-CCNC: 19 U/L — SIGNIFICANT CHANGE UP (ref 10–40)
BASE EXCESS BLDV CALC-SCNC: 7.3 MMOL/L — HIGH (ref -2–2)
BASOPHILS # BLD AUTO: 0 K/UL — SIGNIFICANT CHANGE UP (ref 0–0.2)
BASOPHILS NFR BLD AUTO: 0.6 % — SIGNIFICANT CHANGE UP (ref 0–2)
BILIRUB SERPL-MCNC: 0.3 MG/DL — SIGNIFICANT CHANGE UP (ref 0.2–1.2)
BUN SERPL-MCNC: 12 MG/DL — SIGNIFICANT CHANGE UP (ref 7–23)
CA-I SERPL-SCNC: 1.07 MMOL/L — LOW (ref 1.12–1.3)
CALCIUM SERPL-MCNC: 9.8 MG/DL — SIGNIFICANT CHANGE UP (ref 8.4–10.5)
CHLORIDE BLDV-SCNC: 94 MMOL/L — LOW (ref 96–108)
CHLORIDE SERPL-SCNC: 91 MMOL/L — LOW (ref 96–108)
CO2 BLDV-SCNC: 35 MMOL/L — HIGH (ref 22–30)
CO2 SERPL-SCNC: 30 MMOL/L — SIGNIFICANT CHANGE UP (ref 22–31)
CREAT SERPL-MCNC: 2.77 MG/DL — HIGH (ref 0.5–1.3)
EOSINOPHIL # BLD AUTO: 0.1 K/UL — SIGNIFICANT CHANGE UP (ref 0–0.5)
EOSINOPHIL NFR BLD AUTO: 1.1 % — SIGNIFICANT CHANGE UP (ref 0–6)
GAS PNL BLDV: 135 MMOL/L — SIGNIFICANT CHANGE UP (ref 135–145)
GAS PNL BLDV: SIGNIFICANT CHANGE UP
GAS PNL BLDV: SIGNIFICANT CHANGE UP
GLUCOSE BLDC GLUCOMTR-MCNC: 349 MG/DL — HIGH (ref 70–99)
GLUCOSE BLDV-MCNC: 236 MG/DL — HIGH (ref 70–99)
GLUCOSE SERPL-MCNC: 255 MG/DL — HIGH (ref 70–99)
HCO3 BLDV-SCNC: 33 MMOL/L — HIGH (ref 21–29)
HCT VFR BLD CALC: 34.8 % — SIGNIFICANT CHANGE UP (ref 34.5–45)
HCT VFR BLDA CALC: 33 % — LOW (ref 39–50)
HGB BLD CALC-MCNC: 10.8 G/DL — LOW (ref 11.5–15.5)
HGB BLD-MCNC: 10.9 G/DL — LOW (ref 11.5–15.5)
LACTATE BLDV-MCNC: 1.8 MMOL/L — SIGNIFICANT CHANGE UP (ref 0.7–2)
LYMPHOCYTES # BLD AUTO: 0.6 K/UL — LOW (ref 1–3.3)
LYMPHOCYTES # BLD AUTO: 9.7 % — LOW (ref 13–44)
MCHC RBC-ENTMCNC: 31.2 GM/DL — LOW (ref 32–36)
MCHC RBC-ENTMCNC: 33.3 PG — SIGNIFICANT CHANGE UP (ref 27–34)
MCV RBC AUTO: 107 FL — HIGH (ref 80–100)
MONOCYTES # BLD AUTO: 0.7 K/UL — SIGNIFICANT CHANGE UP (ref 0–0.9)
MONOCYTES NFR BLD AUTO: 10.9 % — SIGNIFICANT CHANGE UP (ref 2–14)
NEUTROPHILS # BLD AUTO: 4.8 K/UL — SIGNIFICANT CHANGE UP (ref 1.8–7.4)
NEUTROPHILS NFR BLD AUTO: 77.7 % — HIGH (ref 43–77)
PCO2 BLDV: 56 MMHG — HIGH (ref 35–50)
PH BLDV: 7.39 — SIGNIFICANT CHANGE UP (ref 7.35–7.45)
PLATELET # BLD AUTO: 260 K/UL — SIGNIFICANT CHANGE UP (ref 150–400)
PO2 BLDV: 20 MMHG — LOW (ref 25–45)
POTASSIUM BLDV-SCNC: 6.7 MMOL/L — CRITICAL HIGH (ref 3.5–5.3)
POTASSIUM SERPL-MCNC: 3.6 MMOL/L — SIGNIFICANT CHANGE UP (ref 3.5–5.3)
POTASSIUM SERPL-SCNC: 3.6 MMOL/L — SIGNIFICANT CHANGE UP (ref 3.5–5.3)
PROT SERPL-MCNC: 7.9 G/DL — SIGNIFICANT CHANGE UP (ref 6–8.3)
RAPID RVP RESULT: DETECTED
RBC # BLD: 3.27 M/UL — LOW (ref 3.8–5.2)
RBC # FLD: 13.1 % — SIGNIFICANT CHANGE UP (ref 10.3–14.5)
RV+EV RNA SPEC QL NAA+PROBE: DETECTED
SAO2 % BLDV: 25 % — LOW (ref 67–88)
SODIUM SERPL-SCNC: 137 MMOL/L — SIGNIFICANT CHANGE UP (ref 135–145)
WBC # BLD: 6.2 K/UL — SIGNIFICANT CHANGE UP (ref 3.8–10.5)
WBC # FLD AUTO: 6.2 K/UL — SIGNIFICANT CHANGE UP (ref 3.8–10.5)

## 2019-09-21 PROCEDURE — 93010 ELECTROCARDIOGRAM REPORT: CPT

## 2019-09-21 PROCEDURE — 99285 EMERGENCY DEPT VISIT HI MDM: CPT | Mod: GC

## 2019-09-21 PROCEDURE — 70450 CT HEAD/BRAIN W/O DYE: CPT | Mod: 26

## 2019-09-21 PROCEDURE — 99223 1ST HOSP IP/OBS HIGH 75: CPT

## 2019-09-21 PROCEDURE — 71275 CT ANGIOGRAPHY CHEST: CPT | Mod: 26

## 2019-09-21 RX ORDER — ISOSORBIDE DINITRATE 5 MG/1
10 TABLET ORAL THREE TIMES A DAY
Refills: 0 | Status: DISCONTINUED | OUTPATIENT
Start: 2019-09-21 | End: 2019-09-30

## 2019-09-21 RX ORDER — ASPIRIN/CALCIUM CARB/MAGNESIUM 324 MG
81 TABLET ORAL DAILY
Refills: 0 | Status: DISCONTINUED | OUTPATIENT
Start: 2019-09-21 | End: 2019-09-30

## 2019-09-21 RX ORDER — PANTOPRAZOLE SODIUM 20 MG/1
40 TABLET, DELAYED RELEASE ORAL
Refills: 0 | Status: DISCONTINUED | OUTPATIENT
Start: 2019-09-21 | End: 2019-09-30

## 2019-09-21 RX ORDER — ATORVASTATIN CALCIUM 80 MG/1
20 TABLET, FILM COATED ORAL AT BEDTIME
Refills: 0 | Status: DISCONTINUED | OUTPATIENT
Start: 2019-09-21 | End: 2019-09-22

## 2019-09-21 RX ORDER — OXYCODONE AND ACETAMINOPHEN 5; 325 MG/1; MG/1
1 TABLET ORAL EVERY 12 HOURS
Refills: 0 | Status: DISCONTINUED | OUTPATIENT
Start: 2019-09-21 | End: 2019-09-23

## 2019-09-21 RX ORDER — BUDESONIDE, MICRONIZED 100 %
0.5 POWDER (GRAM) MISCELLANEOUS
Refills: 0 | Status: DISCONTINUED | OUTPATIENT
Start: 2019-09-21 | End: 2019-09-30

## 2019-09-21 RX ORDER — INSULIN LISPRO 100/ML
VIAL (ML) SUBCUTANEOUS AT BEDTIME
Refills: 0 | Status: DISCONTINUED | OUTPATIENT
Start: 2019-09-21 | End: 2019-09-24

## 2019-09-21 RX ORDER — INSULIN GLARGINE 100 [IU]/ML
9 INJECTION, SOLUTION SUBCUTANEOUS AT BEDTIME
Refills: 0 | Status: DISCONTINUED | OUTPATIENT
Start: 2019-09-21 | End: 2019-09-24

## 2019-09-21 RX ORDER — HYDRALAZINE HCL 50 MG
25 TABLET ORAL THREE TIMES A DAY
Refills: 0 | Status: DISCONTINUED | OUTPATIENT
Start: 2019-09-21 | End: 2019-09-30

## 2019-09-21 RX ORDER — GLUCAGON INJECTION, SOLUTION 0.5 MG/.1ML
1 INJECTION, SOLUTION SUBCUTANEOUS ONCE
Refills: 0 | Status: DISCONTINUED | OUTPATIENT
Start: 2019-09-21 | End: 2019-09-30

## 2019-09-21 RX ORDER — CLOPIDOGREL BISULFATE 75 MG/1
75 TABLET, FILM COATED ORAL DAILY
Refills: 0 | Status: DISCONTINUED | OUTPATIENT
Start: 2019-09-21 | End: 2019-09-30

## 2019-09-21 RX ORDER — DOCUSATE SODIUM 100 MG
100 CAPSULE ORAL THREE TIMES A DAY
Refills: 0 | Status: DISCONTINUED | OUTPATIENT
Start: 2019-09-21 | End: 2019-09-30

## 2019-09-21 RX ORDER — DEXTROSE 50 % IN WATER 50 %
15 SYRINGE (ML) INTRAVENOUS ONCE
Refills: 0 | Status: DISCONTINUED | OUTPATIENT
Start: 2019-09-21 | End: 2019-09-30

## 2019-09-21 RX ORDER — AZITHROMYCIN 500 MG/1
500 TABLET, FILM COATED ORAL ONCE
Refills: 0 | Status: DISCONTINUED | OUTPATIENT
Start: 2019-09-21 | End: 2019-09-22

## 2019-09-21 RX ORDER — SODIUM CHLORIDE 9 MG/ML
1000 INJECTION, SOLUTION INTRAVENOUS
Refills: 0 | Status: DISCONTINUED | OUTPATIENT
Start: 2019-09-21 | End: 2019-09-30

## 2019-09-21 RX ORDER — INSULIN LISPRO 100/ML
3 VIAL (ML) SUBCUTANEOUS ONCE
Refills: 0 | Status: COMPLETED | OUTPATIENT
Start: 2019-09-21 | End: 2019-09-21

## 2019-09-21 RX ORDER — IPRATROPIUM/ALBUTEROL SULFATE 18-103MCG
3 AEROSOL WITH ADAPTER (GRAM) INHALATION ONCE
Refills: 0 | Status: COMPLETED | OUTPATIENT
Start: 2019-09-21 | End: 2019-09-21

## 2019-09-21 RX ORDER — AZITHROMYCIN 500 MG/1
TABLET, FILM COATED ORAL
Refills: 0 | Status: DISCONTINUED | OUTPATIENT
Start: 2019-09-21 | End: 2019-09-22

## 2019-09-21 RX ORDER — INSULIN GLARGINE 100 [IU]/ML
8 INJECTION, SOLUTION SUBCUTANEOUS AT BEDTIME
Refills: 0 | Status: DISCONTINUED | OUTPATIENT
Start: 2019-09-21 | End: 2019-09-21

## 2019-09-21 RX ORDER — AZITHROMYCIN 500 MG/1
500 TABLET, FILM COATED ORAL EVERY 24 HOURS
Refills: 0 | Status: DISCONTINUED | OUTPATIENT
Start: 2019-09-22 | End: 2019-09-22

## 2019-09-21 RX ORDER — PIPERACILLIN AND TAZOBACTAM 4; .5 G/20ML; G/20ML
3.38 INJECTION, POWDER, LYOPHILIZED, FOR SOLUTION INTRAVENOUS EVERY 12 HOURS
Refills: 0 | Status: DISCONTINUED | OUTPATIENT
Start: 2019-09-21 | End: 2019-09-22

## 2019-09-21 RX ORDER — SEVELAMER CARBONATE 2400 MG/1
800 POWDER, FOR SUSPENSION ORAL
Refills: 0 | Status: DISCONTINUED | OUTPATIENT
Start: 2019-09-21 | End: 2019-09-30

## 2019-09-21 RX ORDER — INSULIN LISPRO 100/ML
VIAL (ML) SUBCUTANEOUS
Refills: 0 | Status: DISCONTINUED | OUTPATIENT
Start: 2019-09-21 | End: 2019-09-24

## 2019-09-21 RX ORDER — HEPARIN SODIUM 5000 [USP'U]/ML
5000 INJECTION INTRAVENOUS; SUBCUTANEOUS
Refills: 0 | Status: DISCONTINUED | OUTPATIENT
Start: 2019-09-21 | End: 2019-09-30

## 2019-09-21 RX ORDER — VANCOMYCIN HCL 1 G
1000 VIAL (EA) INTRAVENOUS ONCE
Refills: 0 | Status: COMPLETED | OUTPATIENT
Start: 2019-09-21 | End: 2019-09-21

## 2019-09-21 RX ORDER — SENNA PLUS 8.6 MG/1
2 TABLET ORAL AT BEDTIME
Refills: 0 | Status: DISCONTINUED | OUTPATIENT
Start: 2019-09-21 | End: 2019-09-30

## 2019-09-21 RX ORDER — DEXTROSE 50 % IN WATER 50 %
25 SYRINGE (ML) INTRAVENOUS ONCE
Refills: 0 | Status: DISCONTINUED | OUTPATIENT
Start: 2019-09-21 | End: 2019-09-30

## 2019-09-21 RX ORDER — INSULIN LISPRO 100/ML
3 VIAL (ML) SUBCUTANEOUS
Refills: 0 | Status: DISCONTINUED | OUTPATIENT
Start: 2019-09-21 | End: 2019-09-22

## 2019-09-21 RX ORDER — DEXTROSE 50 % IN WATER 50 %
12.5 SYRINGE (ML) INTRAVENOUS ONCE
Refills: 0 | Status: DISCONTINUED | OUTPATIENT
Start: 2019-09-21 | End: 2019-09-30

## 2019-09-21 RX ORDER — OXYCODONE AND ACETAMINOPHEN 5; 325 MG/1; MG/1
1 TABLET ORAL ONCE
Refills: 0 | Status: DISCONTINUED | OUTPATIENT
Start: 2019-09-21 | End: 2019-09-21

## 2019-09-21 RX ORDER — IPRATROPIUM/ALBUTEROL SULFATE 18-103MCG
3 AEROSOL WITH ADAPTER (GRAM) INHALATION EVERY 6 HOURS
Refills: 0 | Status: DISCONTINUED | OUTPATIENT
Start: 2019-09-21 | End: 2019-09-30

## 2019-09-21 RX ORDER — PIPERACILLIN AND TAZOBACTAM 4; .5 G/20ML; G/20ML
3.38 INJECTION, POWDER, LYOPHILIZED, FOR SOLUTION INTRAVENOUS ONCE
Refills: 0 | Status: COMPLETED | OUTPATIENT
Start: 2019-09-21 | End: 2019-09-21

## 2019-09-21 RX ORDER — METOPROLOL TARTRATE 50 MG
50 TABLET ORAL DAILY
Refills: 0 | Status: DISCONTINUED | OUTPATIENT
Start: 2019-09-21 | End: 2019-09-30

## 2019-09-21 RX ADMIN — OXYCODONE AND ACETAMINOPHEN 1 TABLET(S): 5; 325 TABLET ORAL at 20:42

## 2019-09-21 RX ADMIN — Medication 3 MILLILITER(S): at 17:58

## 2019-09-21 RX ADMIN — Medication 2: at 22:53

## 2019-09-21 RX ADMIN — Medication 3 UNIT(S): at 19:32

## 2019-09-21 RX ADMIN — PIPERACILLIN AND TAZOBACTAM 200 GRAM(S): 4; .5 INJECTION, POWDER, LYOPHILIZED, FOR SOLUTION INTRAVENOUS at 20:31

## 2019-09-21 RX ADMIN — Medication 250 MILLIGRAM(S): at 21:32

## 2019-09-21 NOTE — H&P ADULT - ASSESSMENT
65 yo F with ESRD (on HD M/Tu/Th/Sa), type 1 DM w/DKA in past, CAD, HFrEF (EF 50% on TTE from 10/18), TIA, and PVD, very recent admission here until yesterday for COPD exacerbation felt 2/2 viral illness from enterovirus now presents with worsening dyspnea and hypoxemia down to 80s (O2 sat 82% on intial ED evaluation per ED provider note).

## 2019-09-21 NOTE — H&P ADULT - PROBLEM SELECTOR PLAN 3
Findings of lateral TWI on EKG. May be related to hypoxemia exacerbating underlying CAD. Pt with chronically elevated troponin T.  Check Troponin T (will add on for first set then repeat with AM labs) keeping in mind pt does have chronic elevations  Recheck EKG in AM

## 2019-09-21 NOTE — H&P ADULT - NSHPLABSRESULTS_GEN_ALL_CORE
Labs personally reviewed:                        10.9   6.2   )-----------( 260      ( 21 Sep 2019 16:49 )             34.8       09-21    137  |  91<L>  |  12  ----------------------------<  255<H>  3.6   |  30  |  2.77<H>    Ca    9.8      21 Sep 2019 16:49    TPro  7.9  /  Alb  4.4  /  TBili  0.3  /  DBili  x   /  AST  19  /  ALT  19  /  AlkPhos  155<H>  09-21    LIVER FUNCTIONS - ( 21 Sep 2019 16:49 )  Alb: 4.4 g/dL / Pro: 7.9 g/dL / ALK PHOS: 155 U/L / ALT: 19 U/L / AST: 19 U/L / GGT: x           Imaging personally reviewed-tree in bud opacities on CT imaging concerning for pneumonia    EKG personally reviewed-NSR with pvc, some lateral t-wave inversions in V4-V6 these do appear new vs EKG from prior recent admissions, mild QTc prolongation to ~480ms

## 2019-09-21 NOTE — ED ADULT NURSE NOTE - OBJECTIVE STATEMENT
63 yo female with PMH ESRD (on HD, T, TH, S- last dialysis today, completed) Type 1 DM, CHF, CAD, ischemic cardiomyopathy , COPD presents to the ED after dialysis via EMS c/o SOB and lethargy. she states she came home from dialysis and was SOB, called PCP for O2 and was decline. EMS states when they got there, patient O2=80s. patient was recently d/c yesterday from hospital for DKA and COPD exacerbation. patient is AAOx3. lung sounds wheezes bilaterally. cap refill <3sec. cough noted with green sputum. patient has swelling to left leg, +3 pitting edema- baseline for 1 year as per patient. +2 radial and dorsal pulses noted bilaterally. patient denies fevers, chills, abdominal pain, N/V/D, CP, numbness or tingling. patient makes little urine, bruit and thrill noted to left arm fistula. VSS. MD at the bedside. NSR on monitor. EKG completed.

## 2019-09-21 NOTE — ED PROVIDER NOTE - PMH
ACS (acute coronary syndrome)  1/2015 - cath revealed 100% ostial stenosis not amenable to PCI - medical management  CAD (coronary artery disease)  s/p stents  CHF (congestive heart failure)  EF 40-45%  COPD (chronic obstructive pulmonary disease)    CVA (cerebral infarction)  with no residual, 8 yrs ago, prior to heart surgery - ST memory loss  Diabetes mellitus type 1  Insulin Dependent -  DKA, type 1  1/2015  ESRD (end stage renal disease)  dialysis  M, tue, thursday, saturday  Gout  past  Hyperlipidemia    Localized enlarged lymph nodes    Peripheral vascular disease  occluded left fem-pop bypass 5/2015  Subclavian vein stenosis, left  s/p stent  TIA (transient ischemic attack)  x 2 - 8-9 years ago prior to ASD/VSD repair Prophylactic measure Transaminitis

## 2019-09-21 NOTE — ED PROVIDER NOTE - OBJECTIVE STATEMENT
hx taken from old chart .63 y/o F w/ PMHx ESRD (on HD) Type 1 DM, CHF, CAD, ischemic cardiomyopathy , COPD presents to the ED after dyalisis pt also in DKA, hyperglycemia and now with hyperkalemia as well as DKA; DKA in setting of viral infection/entero virus.  COPD exacerbation/ Viral syndrome;  seen by endo/pulmonary/cardiology/ID; monitored off antibiotics.  adjusted insulin dose; had HD per renal;  follow up with pulmonary appointment on 9/27/19; follow up with Dr brand Reddy on 9/25/19 at 10 am, to adjust diabetic medication as needed,  hemodialysis per Dr. Burger-on Tuesday, Thursday and Saturday. follow up blood work by renal.   patient is cleared by MD for discharge home. hx taken from old chart .65 y/o F w/ PMHx ESRD (on HD) Type 1 DM, CHF, CAD, ischemic cardiomyopathy , COPD presents to the ED after  dialysis with hypoxia ,states that she came home and has shortness of breathe, she asked for oxygen at home from PMD who declined. No chest pain, no fever or cough, states that shes short of breathe all the time.  Pt was recently discharged form the hospital was admitted for DKA and exacerbation of COPD.   PMD dr Nichols ,nephrologist dr Webb

## 2019-09-21 NOTE — ED PROVIDER NOTE - MUSCULOSKELETAL, MLM
Spine appears normal, range of motion is not limited, no muscle or joint tenderness left low leg swollen for years

## 2019-09-21 NOTE — H&P ADULT - NSHPPHYSICALEXAM_GEN_ALL_CORE
Vital Signs Last 24 Hrs  T(C): 36.6 (21 Sep 2019 22:29), Max: 36.6 (21 Sep 2019 18:00)  T(F): 97.8 (21 Sep 2019 22:29), Max: 97.8 (21 Sep 2019 18:00)  HR: 82 (21 Sep 2019 22:29) (82 - 86)  BP: 126/48 (21 Sep 2019 22:29) (125/56 - 142/87)  BP(mean): --  RR: 18 (21 Sep 2019 22:29) (18 - 27)  SpO2: 99% (21 Sep 2019 22:29) (97% - 100%)    GENERAL: No acute distress, well-developed  HEAD:  Atraumatic, Normocephalic  EYES: EOMI, PERRLA, conjunctiva and sclera clear  ENT: Oral mucosa moist  NECK: Neck supple  CHEST/LUNG: Clear to auscultation bilaterally; No wheeze, no rales, no rhonchi.    HEART: Regular rate and rhythm; No murmurs, rubs, or gallops  ABDOMEN: Soft, Nontender, Nondistended; Bowel sounds present  EXTREMITIES:  No clubbing, cyanosis, or edema  VASCULAR: Posterior tibialis pulses intact bilaterally  PSYCH: Normal behavior, normal affect  NEUROLOGY: AAOx3  SKIN: grossly warm and dry

## 2019-09-21 NOTE — H&P ADULT - HISTORY OF PRESENT ILLNESS
Patient is a 65 yo F with ESRD (on HD M/Tu/Th/Sa), type 1 DM w/DKA in past, CAD, HFrEF (EF 50% on TTE from 10/18), TIA, and PVD, very recent admission here until yesterday for COPD exacerbation felt 2/2 viral illness from enterovirus now presents with worsening dyspnea and hypoxemia down to 80s (O2 sat 82% on intial ED evaluation per ED provider note). Patient reports that she had been discharged home yesterday and today went for her usual dialysis. However at dialysis felt short of breath and required O2 via NC throughout dialysis session. After dialysis session patient took a car home but did not have O2 supplementation and by the time she reached home she reported that she was more dyspneic and felt very weak-so weak that she could not get out of the car and her son had to help her get out. +chills. Patient reports that chills have been ongoing for a week as has a cough productive of yellowish to green sputum. Worsened weakness and worsened sats are new. No chest pain, no palpitations. No diarrhea. No nausea/vomiting. No focal neurologic symptoms. No skin rash. No headache. +runny nose x 1 week. No sore throat. 	    No family hx relevant to current admission for health associated pna

## 2019-09-21 NOTE — ED PROVIDER NOTE - CLINICAL SUMMARY MEDICAL DECISION MAKING FREE TEXT BOX
64 year old female with multiple medical problems, finished her dialysis today, came in with a dyspnea, "unable to move her legs and arms" and she states that she needs oxygen. PSO2 in the ER is 82, markedly improved after 100% oxygen, will obtain chest x ray, blood work, and re evaluate. ZR

## 2019-09-21 NOTE — ED ADULT NURSE NOTE - NSIMPLEMENTINTERV_GEN_ALL_ED
Implemented All Universal Safety Interventions:  [place] to call system. Call bell, personal items and telephone within reach. Instruct patient to call for assistance. Room bathroom lighting operational. Non-slip footwear when patient is off stretcher. Physically safe environment: no spills, clutter or unnecessary equipment. Stretcher in lowest position, wheels locked, appropriate side rails in place.

## 2019-09-21 NOTE — H&P ADULT - PROBLEM SELECTOR PLAN 1
Concern for HCAP given multiple recent hospitalizations in setting of known viral URI.  Vancomycin by level  Zosyn 3.375g q12h  Azithromycin 500mg iv daily  O2 supplemental PRN

## 2019-09-21 NOTE — H&P ADULT - PROBLEM SELECTOR PLAN 5
Appears euvolemic at this time  Volume management via HD while inpatient, did receive HD already today  Will need renal consult for HD

## 2019-09-21 NOTE — ED ADULT NURSE REASSESSMENT NOTE - NS ED NURSE REASSESS COMMENT FT1
Report received from KRISTINE Kimball. pt In no acute distress, diet ginger ale provided. pt to have finger stick preformed and to be fed

## 2019-09-22 DIAGNOSIS — N18.6 END STAGE RENAL DISEASE: ICD-10-CM

## 2019-09-22 DIAGNOSIS — I25.10 ATHEROSCLEROTIC HEART DISEASE OF NATIVE CORONARY ARTERY WITHOUT ANGINA PECTORIS: ICD-10-CM

## 2019-09-22 DIAGNOSIS — R06.00 DYSPNEA, UNSPECIFIED: ICD-10-CM

## 2019-09-22 DIAGNOSIS — J18.1 LOBAR PNEUMONIA, UNSPECIFIED ORGANISM: ICD-10-CM

## 2019-09-22 DIAGNOSIS — I50.9 HEART FAILURE, UNSPECIFIED: ICD-10-CM

## 2019-09-22 DIAGNOSIS — E10.22 TYPE 1 DIABETES MELLITUS WITH DIABETIC CHRONIC KIDNEY DISEASE: ICD-10-CM

## 2019-09-22 DIAGNOSIS — J44.9 CHRONIC OBSTRUCTIVE PULMONARY DISEASE, UNSPECIFIED: ICD-10-CM

## 2019-09-22 DIAGNOSIS — R94.31 ABNORMAL ELECTROCARDIOGRAM [ECG] [EKG]: ICD-10-CM

## 2019-09-22 LAB
ANION GAP SERPL CALC-SCNC: 20 MMOL/L — HIGH (ref 5–17)
B-OH-BUTYR SERPL-SCNC: 2.5 MMOL/L — HIGH
BASOPHILS # BLD AUTO: 0.03 K/UL — SIGNIFICANT CHANGE UP (ref 0–0.2)
BASOPHILS NFR BLD AUTO: 0.8 % — SIGNIFICANT CHANGE UP (ref 0–2)
BUN SERPL-MCNC: 20 MG/DL — SIGNIFICANT CHANGE UP (ref 7–23)
CALCIUM SERPL-MCNC: 9 MG/DL — SIGNIFICANT CHANGE UP (ref 8.4–10.5)
CHLORIDE SERPL-SCNC: 89 MMOL/L — LOW (ref 96–108)
CO2 SERPL-SCNC: 25 MMOL/L — SIGNIFICANT CHANGE UP (ref 22–31)
CREAT SERPL-MCNC: 3.88 MG/DL — HIGH (ref 0.5–1.3)
EOSINOPHIL # BLD AUTO: 0.08 K/UL — SIGNIFICANT CHANGE UP (ref 0–0.5)
EOSINOPHIL NFR BLD AUTO: 2.1 % — SIGNIFICANT CHANGE UP (ref 0–6)
GLUCOSE BLDC GLUCOMTR-MCNC: 206 MG/DL — HIGH (ref 70–99)
GLUCOSE BLDC GLUCOMTR-MCNC: 236 MG/DL — HIGH (ref 70–99)
GLUCOSE BLDC GLUCOMTR-MCNC: 331 MG/DL — HIGH (ref 70–99)
GLUCOSE BLDC GLUCOMTR-MCNC: 378 MG/DL — HIGH (ref 70–99)
GLUCOSE BLDC GLUCOMTR-MCNC: 428 MG/DL — HIGH (ref 70–99)
GLUCOSE BLDC GLUCOMTR-MCNC: 441 MG/DL — HIGH (ref 70–99)
GLUCOSE SERPL-MCNC: 436 MG/DL — HIGH (ref 70–99)
HCT VFR BLD CALC: 28.6 % — LOW (ref 34.5–45)
HGB BLD-MCNC: 8.9 G/DL — LOW (ref 11.5–15.5)
IMM GRANULOCYTES NFR BLD AUTO: 0.5 % — SIGNIFICANT CHANGE UP (ref 0–1.5)
LYMPHOCYTES # BLD AUTO: 0.65 K/UL — LOW (ref 1–3.3)
LYMPHOCYTES # BLD AUTO: 17.4 % — SIGNIFICANT CHANGE UP (ref 13–44)
MAGNESIUM SERPL-MCNC: 2.1 MG/DL — SIGNIFICANT CHANGE UP (ref 1.6–2.6)
MCHC RBC-ENTMCNC: 31.1 GM/DL — LOW (ref 32–36)
MCHC RBC-ENTMCNC: 33.3 PG — SIGNIFICANT CHANGE UP (ref 27–34)
MCV RBC AUTO: 107.1 FL — HIGH (ref 80–100)
MONOCYTES # BLD AUTO: 0.45 K/UL — SIGNIFICANT CHANGE UP (ref 0–0.9)
MONOCYTES NFR BLD AUTO: 12.1 % — SIGNIFICANT CHANGE UP (ref 2–14)
NEUTROPHILS # BLD AUTO: 2.5 K/UL — SIGNIFICANT CHANGE UP (ref 1.8–7.4)
NEUTROPHILS NFR BLD AUTO: 67.1 % — SIGNIFICANT CHANGE UP (ref 43–77)
PHOSPHATE SERPL-MCNC: 4 MG/DL — SIGNIFICANT CHANGE UP (ref 2.5–4.5)
PLATELET # BLD AUTO: 206 K/UL — SIGNIFICANT CHANGE UP (ref 150–400)
POTASSIUM SERPL-MCNC: 4 MMOL/L — SIGNIFICANT CHANGE UP (ref 3.5–5.3)
POTASSIUM SERPL-SCNC: 4 MMOL/L — SIGNIFICANT CHANGE UP (ref 3.5–5.3)
RBC # BLD: 2.67 M/UL — LOW (ref 3.8–5.2)
RBC # FLD: 13.9 % — SIGNIFICANT CHANGE UP (ref 10.3–14.5)
SODIUM SERPL-SCNC: 134 MMOL/L — LOW (ref 135–145)
TROPONIN T, HIGH SENSITIVITY RESULT: 426 NG/L — HIGH (ref 0–51)
TROPONIN T, HIGH SENSITIVITY RESULT: 436 NG/L — HIGH (ref 0–51)
VANCOMYCIN TROUGH SERPL-MCNC: 19.8 UG/ML — SIGNIFICANT CHANGE UP (ref 10–20)
WBC # BLD: 3.73 K/UL — LOW (ref 3.8–10.5)
WBC # FLD AUTO: 3.73 K/UL — LOW (ref 3.8–10.5)

## 2019-09-22 PROCEDURE — 99232 SBSQ HOSP IP/OBS MODERATE 35: CPT

## 2019-09-22 PROCEDURE — 93010 ELECTROCARDIOGRAM REPORT: CPT

## 2019-09-22 RX ORDER — AZITHROMYCIN 500 MG/1
500 TABLET, FILM COATED ORAL ONCE
Refills: 0 | Status: COMPLETED | OUTPATIENT
Start: 2019-09-22 | End: 2019-09-22

## 2019-09-22 RX ORDER — ATORVASTATIN CALCIUM 80 MG/1
40 TABLET, FILM COATED ORAL AT BEDTIME
Refills: 0 | Status: DISCONTINUED | OUTPATIENT
Start: 2019-09-22 | End: 2019-09-30

## 2019-09-22 RX ORDER — INSULIN LISPRO 100/ML
2 VIAL (ML) SUBCUTANEOUS AT BEDTIME
Refills: 0 | Status: DISCONTINUED | OUTPATIENT
Start: 2019-09-22 | End: 2019-09-24

## 2019-09-22 RX ORDER — AZITHROMYCIN 500 MG/1
TABLET, FILM COATED ORAL
Refills: 0 | Status: DISCONTINUED | OUTPATIENT
Start: 2019-09-22 | End: 2019-09-22

## 2019-09-22 RX ORDER — CEFEPIME 1 G/1
1000 INJECTION, POWDER, FOR SOLUTION INTRAMUSCULAR; INTRAVENOUS EVERY 24 HOURS
Refills: 0 | Status: DISCONTINUED | OUTPATIENT
Start: 2019-09-22 | End: 2019-09-24

## 2019-09-22 RX ORDER — INSULIN LISPRO 100/ML
5 VIAL (ML) SUBCUTANEOUS
Refills: 0 | Status: DISCONTINUED | OUTPATIENT
Start: 2019-09-22 | End: 2019-09-24

## 2019-09-22 RX ORDER — PIPERACILLIN AND TAZOBACTAM 4; .5 G/20ML; G/20ML
3.38 INJECTION, POWDER, LYOPHILIZED, FOR SOLUTION INTRAVENOUS EVERY 12 HOURS
Refills: 0 | Status: DISCONTINUED | OUTPATIENT
Start: 2019-09-22 | End: 2019-09-22

## 2019-09-22 RX ADMIN — INSULIN GLARGINE 9 UNIT(S): 100 INJECTION, SOLUTION SUBCUTANEOUS at 00:15

## 2019-09-22 RX ADMIN — OXYCODONE AND ACETAMINOPHEN 1 TABLET(S): 5; 325 TABLET ORAL at 23:37

## 2019-09-22 RX ADMIN — CEFEPIME 100 MILLIGRAM(S): 1 INJECTION, POWDER, FOR SOLUTION INTRAMUSCULAR; INTRAVENOUS at 18:18

## 2019-09-22 RX ADMIN — Medication 5 UNIT(S): at 18:19

## 2019-09-22 RX ADMIN — CLOPIDOGREL BISULFATE 75 MILLIGRAM(S): 75 TABLET, FILM COATED ORAL at 12:20

## 2019-09-22 RX ADMIN — Medication 2: at 18:19

## 2019-09-22 RX ADMIN — OXYCODONE AND ACETAMINOPHEN 1 TABLET(S): 5; 325 TABLET ORAL at 12:21

## 2019-09-22 RX ADMIN — OXYCODONE AND ACETAMINOPHEN 1 TABLET(S): 5; 325 TABLET ORAL at 23:07

## 2019-09-22 RX ADMIN — OXYCODONE AND ACETAMINOPHEN 1 TABLET(S): 5; 325 TABLET ORAL at 13:21

## 2019-09-22 RX ADMIN — Medication 3 MILLILITER(S): at 12:20

## 2019-09-22 RX ADMIN — AZITHROMYCIN 250 MILLIGRAM(S): 500 TABLET, FILM COATED ORAL at 01:25

## 2019-09-22 RX ADMIN — Medication 4: at 12:19

## 2019-09-22 RX ADMIN — Medication 3 UNIT(S): at 12:19

## 2019-09-22 RX ADMIN — Medication 3 MILLILITER(S): at 00:16

## 2019-09-22 RX ADMIN — SEVELAMER CARBONATE 800 MILLIGRAM(S): 2400 POWDER, FOR SUSPENSION ORAL at 18:20

## 2019-09-22 RX ADMIN — ISOSORBIDE DINITRATE 10 MILLIGRAM(S): 5 TABLET ORAL at 13:17

## 2019-09-22 RX ADMIN — PIPERACILLIN AND TAZOBACTAM 25 GRAM(S): 4; .5 INJECTION, POWDER, LYOPHILIZED, FOR SOLUTION INTRAVENOUS at 08:13

## 2019-09-22 RX ADMIN — Medication 0.5 MILLIGRAM(S): at 18:20

## 2019-09-22 RX ADMIN — Medication 1 TABLET(S): at 13:18

## 2019-09-22 RX ADMIN — Medication 3 MILLILITER(S): at 18:20

## 2019-09-22 RX ADMIN — Medication 25 MILLIGRAM(S): at 06:18

## 2019-09-22 RX ADMIN — INSULIN GLARGINE 9 UNIT(S): 100 INJECTION, SOLUTION SUBCUTANEOUS at 21:35

## 2019-09-22 RX ADMIN — Medication 3 MILLILITER(S): at 06:17

## 2019-09-22 RX ADMIN — Medication 100 MILLIGRAM(S): at 13:18

## 2019-09-22 RX ADMIN — Medication 50 MILLIGRAM(S): at 06:18

## 2019-09-22 RX ADMIN — ATORVASTATIN CALCIUM 40 MILLIGRAM(S): 80 TABLET, FILM COATED ORAL at 21:35

## 2019-09-22 RX ADMIN — Medication 81 MILLIGRAM(S): at 12:20

## 2019-09-22 RX ADMIN — Medication 3 UNIT(S): at 08:14

## 2019-09-22 RX ADMIN — Medication 6: at 06:17

## 2019-09-22 RX ADMIN — OXYCODONE AND ACETAMINOPHEN 1 TABLET(S): 5; 325 TABLET ORAL at 00:14

## 2019-09-22 RX ADMIN — Medication 25 MILLIGRAM(S): at 21:35

## 2019-09-22 RX ADMIN — SEVELAMER CARBONATE 800 MILLIGRAM(S): 2400 POWDER, FOR SUSPENSION ORAL at 09:27

## 2019-09-22 RX ADMIN — ISOSORBIDE DINITRATE 10 MILLIGRAM(S): 5 TABLET ORAL at 06:18

## 2019-09-22 RX ADMIN — ISOSORBIDE DINITRATE 10 MILLIGRAM(S): 5 TABLET ORAL at 21:35

## 2019-09-22 RX ADMIN — Medication 2 UNIT(S): at 21:35

## 2019-09-22 RX ADMIN — SEVELAMER CARBONATE 800 MILLIGRAM(S): 2400 POWDER, FOR SUSPENSION ORAL at 12:19

## 2019-09-22 NOTE — PROGRESS NOTE ADULT - ASSESSMENT
· Assessment		  65 yo F with ESRD (on HD M/Tu/Th/Sa), type 1 DM w/DKA in past, CAD, HFrEF (EF 50% on TTE from 10/18), TIA, and PVD, very recent admission here until yesterday for COPD exacerbation felt 2/2 viral illness from enterovirus now presents with worsening dyspnea and hypoxemia down to 80s (O2 sat 82% on intial ED evaluation per ED provider note).       Pneumonia of left lower lobe due to infectious organism.   - Concern for HCAP given multiple recent hospitalizations in setting of known viral URI.  Vancomycin by level  Zosyn 3.375g q12h  Azithromycin 500mg iv daily  O2 supplemental PRN.  ID evaluation Dr. Mcallister  Pulmonary evaluation Dr. Tate     Chronic obstructive pulmonary disease, unspecified COPD type.   Duonebs q6h ATC  budesonide nebulizers q12h  No sign of acute exacerbation at this time.     EKG abnormalities.   Findings of lateral TWI on EKG. May be related to hypoxemia exacerbating underlying CAD. Pt with chronically elevated troponin T.  Check Troponin T (will add on for first set then repeat with AM labs) keeping in mind pt does have chronic elevations  Cardiology evaluation Dr. Biswas    Type 1 diabetes mellitus with chronic kidney disease on chronic dialysis.   Lantus 9 units qhs  Humalog 3 units TID AC  ISS.   Endocrine evaluation    Chronic congestive heart failure, unspecified heart failure type.  Appears euvolemic at this time  Volume management via HD while inpatient, did receive HD already today  Will need renal consult for HD.     Coronary artery disease involving native coronary artery of native heart without angina pectoris.   atorvastatin  Aspirin  Plavix    ESRD  HD  Nephrology evaluation Dr. Brayan Viera MD pager [phone]

## 2019-09-22 NOTE — PROGRESS NOTE ADULT - SUBJECTIVE AND OBJECTIVE BOX
Patient is a 64y old  Female who presents with a chief complaint of Hypoxemia 2/2 healthcare associated pneumonia (22 Sep 2019 12:55)    Admitted overnight by hospitalist service   SUBJECTIVE / OVERNIGHT EVENTS: Comfortable without new complaints.   Review of Systems  chest pain no  palpitations no  sob improving   nausea no  headache no    MEDICATIONS  (STANDING):  ALBUTerol/ipratropium for Nebulization 3 milliLiter(s) Nebulizer every 6 hours  aspirin enteric coated 81 milliGRAM(s) Oral daily  atorvastatin 40 milliGRAM(s) Oral at bedtime  azithromycin  IVPB      buDESOnide    Inhalation Suspension 0.5 milliGRAM(s) Inhalation two times a day  clopidogrel Tablet 75 milliGRAM(s) Oral daily  dextrose 5%. 1000 milliLiter(s) (50 mL/Hr) IV Continuous <Continuous>  dextrose 50% Injectable 12.5 Gram(s) IV Push once  dextrose 50% Injectable 25 Gram(s) IV Push once  dextrose 50% Injectable 25 Gram(s) IV Push once  docusate sodium 100 milliGRAM(s) Oral three times a day  heparin  Injectable 5000 Unit(s) SubCutaneous two times a day  hydrALAZINE 25 milliGRAM(s) Oral three times a day  insulin glargine Injectable (LANTUS) 9 Unit(s) SubCutaneous at bedtime  insulin lispro (HumaLOG) corrective regimen sliding scale   SubCutaneous three times a day before meals  insulin lispro (HumaLOG) corrective regimen sliding scale   SubCutaneous at bedtime  insulin lispro Injectable (HumaLOG) 3 Unit(s) SubCutaneous three times a day before meals  isosorbide   dinitrate Tablet (ISORDIL) 10 milliGRAM(s) Oral three times a day  metoprolol succinate ER 50 milliGRAM(s) Oral daily  Nephro-raphael 1 Tablet(s) Oral daily  pantoprazole    Tablet 40 milliGRAM(s) Oral before breakfast  piperacillin/tazobactam IVPB.. 3.375 Gram(s) IV Intermittent every 12 hours  sevelamer carbonate 800 milliGRAM(s) Oral three times a day with meals    MEDICATIONS  (PRN):  dextrose 40% Gel 15 Gram(s) Oral once PRN Blood Glucose LESS THAN 70 milliGRAM(s)/deciliter  glucagon  Injectable 1 milliGRAM(s) IntraMuscular once PRN Glucose LESS THAN 70 milligrams/deciliter  oxyCODONE    5 mG/acetaminophen 325 mG 1 Tablet(s) Oral every 12 hours PRN Severe Pain (7 - 10)  senna 2 Tablet(s) Oral at bedtime PRN Constipation      Vital Signs Last 24 Hrs  T(C): 36.3 (22 Sep 2019 12:17), Max: 36.9 (22 Sep 2019 03:11)  T(F): 97.3 (22 Sep 2019 12:17), Max: 98.5 (22 Sep 2019 03:11)  HR: 83 (22 Sep 2019 13:15) (78 - 86)  BP: 111/65 (22 Sep 2019 13:15) (111/58 - 142/87)  BP(mean): --  RR: 17 (22 Sep 2019 12:17) (17 - 27)  SpO2: 100% (22 Sep 2019 12:17) (96% - 100%)    PHYSICAL EXAM:  GENERAL: NAD  HEAD:  Atraumatic, Normocephalic  EYES: EOMI, PERRLA, conjunctiva and sclera clear  NECK: Supple, No JVD  CHEST/LUNG: few crackles to auscultation bilaterally; No wheeze  HEART: Regular rate and rhythm; No murmurs, rubs, or gallops  ABDOMEN: Soft, Nontender, Nondistended; Bowel sounds present  EXTREMITIES:  2+ Peripheral Pulses, No clubbing, cyanosis, or edema  PSYCH: AAOx3  NEUROLOGY: non-focal  SKIN: No rashes or lesions    LABS:                        8.9    3.73  )-----------( 206      ( 22 Sep 2019 09:16 )             28.6     09-22    134<L>  |  89<L>  |  20  ----------------------------<  436<H>  4.0   |  25  |  3.88<H>    Ca    9.0      22 Sep 2019 06:20  Phos  4.0     09-22  Mg     2.1     09-22    TPro  7.9  /  Alb  4.4  /  TBili  0.3  /  DBili  x   /  AST  19  /  ALT  19  /  AlkPhos  155<H>  09-21                RADIOLOGY & ADDITIONAL TESTS:    Imaging Personally Reviewed:    Consultant(s) Notes Reviewed:      Care Discussed with Consultants/Other Providers:

## 2019-09-22 NOTE — PROGRESS NOTE ADULT - SUBJECTIVE AND OBJECTIVE BOX
Chief Complaint: Evaluating this 63 y/o F for uncontrolled Type 1 DM w/ hyperglycemia      Interval History: Pt. just discharged a day ago. Well known to our service. Now readmitted with SOB, chills, and weakness with hyperglycemia.     MEDICATIONS  (STANDING):  ALBUTerol/ipratropium for Nebulization 3 milliLiter(s) Nebulizer every 6 hours  aspirin enteric coated 81 milliGRAM(s) Oral daily  atorvastatin 40 milliGRAM(s) Oral at bedtime  azithromycin  IVPB      buDESOnide    Inhalation Suspension 0.5 milliGRAM(s) Inhalation two times a day  clopidogrel Tablet 75 milliGRAM(s) Oral daily  dextrose 5%. 1000 milliLiter(s) (50 mL/Hr) IV Continuous <Continuous>  dextrose 50% Injectable 12.5 Gram(s) IV Push once  dextrose 50% Injectable 25 Gram(s) IV Push once  dextrose 50% Injectable 25 Gram(s) IV Push once  docusate sodium 100 milliGRAM(s) Oral three times a day  heparin  Injectable 5000 Unit(s) SubCutaneous two times a day  hydrALAZINE 25 milliGRAM(s) Oral three times a day  insulin glargine Injectable (LANTUS) 9 Unit(s) SubCutaneous at bedtime  insulin lispro (HumaLOG) corrective regimen sliding scale   SubCutaneous three times a day before meals  insulin lispro (HumaLOG) corrective regimen sliding scale   SubCutaneous at bedtime  insulin lispro Injectable (HumaLOG) 5 Unit(s) SubCutaneous three times a day before meals  isosorbide   dinitrate Tablet (ISORDIL) 10 milliGRAM(s) Oral three times a day  metoprolol succinate ER 50 milliGRAM(s) Oral daily  Nephro-raphael 1 Tablet(s) Oral daily  pantoprazole    Tablet 40 milliGRAM(s) Oral before breakfast  piperacillin/tazobactam IVPB.. 3.375 Gram(s) IV Intermittent every 12 hours  sevelamer carbonate 800 milliGRAM(s) Oral three times a day with meals    MEDICATIONS  (PRN):  dextrose 40% Gel 15 Gram(s) Oral once PRN Blood Glucose LESS THAN 70 milliGRAM(s)/deciliter  glucagon  Injectable 1 milliGRAM(s) IntraMuscular once PRN Glucose LESS THAN 70 milligrams/deciliter  oxyCODONE    5 mG/acetaminophen 325 mG 1 Tablet(s) Oral every 12 hours PRN Severe Pain (7 - 10)  senna 2 Tablet(s) Oral at bedtime PRN Constipation      Allergies    No Known Allergies    Intolerances      Review of Systems:  Constitutional: No fever +chills and weakness  Eyes: No blurry vision  Cardiovascular: No chest pain  Respiratory: +SOB  GI: No abdominal pain, No nausea, No vomiting  Endocrine: as noted in HPI    All other negative      PHYSICAL EXAM:  VITALS: T(C): 36.3 (09-22-19 @ 12:17)  T(F): 97.3 (09-22-19 @ 12:17), Max: 98.5 (09-22-19 @ 03:11)  HR: 83 (09-22-19 @ 13:15) (78 - 86)  BP: 111/65 (09-22-19 @ 13:15) (111/58 - 142/87)  RR:  (17 - 27)  SpO2:  (96% - 100%)  Wt(kg): --  GENERAL: NAD at this time  EYES: EOMI, No proptosis  HEENT:  Atraumatic, Normocephalic,   RESPIRATORY: +b/l expiratory rhonchi, full excursion, non labored  CARDIOVASCULAR: Regular rhythm; normal S1/S2, +b/l LE peripheral edema  GI: Soft, nontender, non distended, normal bowel sounds  SKIN: Warm and dry  PSYCH: normal affect, normal mood      POCT Blood Glucose.: 331 mg/dL (09-22-19 @ 11:48)  POCT Blood Glucose.: 378 mg/dL (09-22-19 @ 07:45)  POCT Blood Glucose.: 441 mg/dL (09-22-19 @ 05:58)  POCT Blood Glucose.: 428 mg/dL (09-22-19 @ 05:55)  POCT Blood Glucose.: 349 mg/dL (09-21-19 @ 22:31)  POCT Blood Glucose.: 276 mg/dL (09-21-19 @ 19:17)  POCT Blood Glucose.: 237 mg/dL (09-21-19 @ 16:31)  POCT Blood Glucose.: 225 mg/dL (09-20-19 @ 12:24)  POCT Blood Glucose.: 287 mg/dL (09-20-19 @ 08:46)  POCT Blood Glucose.: 206 mg/dL (09-20-19 @ 02:17)  POCT Blood Glucose.: 119 mg/dL (09-19-19 @ 23:28)  POCT Blood Glucose.: 80 mg/dL (09-19-19 @ 23:04)  POCT Blood Glucose.: 65 mg/dL (09-19-19 @ 22:35)  POCT Blood Glucose.: 64 mg/dL (09-19-19 @ 22:30)  POCT Blood Glucose.: 67 mg/dL (09-19-19 @ 22:09)  POCT Blood Glucose.: 63 mg/dL (09-19-19 @ 22:08)  POCT Blood Glucose.: 266 mg/dL (09-19-19 @ 18:35)        09-22    134<L>  |  89<L>  |  20  ----------------------------<  436<H>  4.0   |  25  |  3.88<H>    EGFR if : 14<L>  EGFR if non : 12<L>    Ca    9.0      09-22  Mg     2.1     09-22  Phos  4.0     09-22    TPro  7.9  /  Alb  4.4  /  TBili  0.3  /  DBili  x   /  AST  19  /  ALT  19  /  AlkPhos  155<H>  09-21        Thyroid Function Tests:      Hemoglobin A1C, Whole Blood: 8.1 % <H> [4.0 - 5.6] (09-16-19 @ 08:04)

## 2019-09-22 NOTE — CHART NOTE - NSCHARTNOTEFT_GEN_A_CORE
Medicine NP    Notified by RN patient with  this AM, asymptomatic. Seen patient at bedside, is alert, NAD. Denies dizziness, nausea/vomiting, abdominal pain. VSS, Patient is type 1 DM. BMP, Beta hydroxybutyrate, UA ordered stat. SSI given now. Zosyn base solution changed to NS. F/U primary team in AM.    Corine Mendez, [facility]  [id number] Medicine NP    Notified by RN patient with  this AM, asymptomatic. Seen patient at bedside, is alert, NAD. Denies dizziness, nausea/vomiting, abdominal pain. VSS. Patient is type 1 DM. BMP, Beta hydroxybutyrate, UA ordered stat, SSI given now, Zosyn base solution changed to NS.  F/U primary team in AM.    Corine Mendez, [facility]  [id number] Medicine NP    Notified by RN patient with  this AM, asymptomatic. Seen patient at bedside, is alert, NAD. Denies dizziness, nausea/vomiting, abdominal pain. VSS. Patient is type 1 DM. BMP, Beta hydroxybutyrate ordered stat, SSI given now, Zosyn base solution changed to NS.  F/U primary team in AM.    Corine Mendez, [facility]  [id number] Medicine NP    Notified by RN patient with  this AM, asymptomatic. Seen patient at bedside, is alert, NAD. Denies dizziness, nausea/vomiting, abdominal pain. VSS. Patient is type 1 DM, hyperglycemia in setting of infection and antibiotics in dextrose. BMP, Beta hydroxybutyrate ordered stat, SSI given now, Zosyn base solution changed to NS.  F/U primary team in AM.    Corine Mendez, [facility]  [id number] Medicine NP    Notified by RN patient with  this AM, asymptomatic. Seen patient at bedside, is alert, NAD. Denies dizziness, nausea/vomiting, abdominal pain. VSS.     VITAL SIGNS:  Vital Signs Last 24 Hrs  T(C): 36.9 (22 Sep 2019 03:11), Max: 36.9 (22 Sep 2019 03:11)  T(F): 98.5 (22 Sep 2019 03:11), Max: 98.5 (22 Sep 2019 03:11)  HR: 80 (22 Sep 2019 06:13) (80 - 86)  BP: 111/58 (22 Sep 2019 06:13) (111/58 - 142/87)  BP(mean): --  RR: 18 (22 Sep 2019 03:11) (18 - 27)  SpO2: 96% (22 Sep 2019 03:11) (96% - 100%)    Patient is type 1 DM w/ hx of DKA. Hyperglycemia in setting of infection and antibiotics in dextrose, will r/o DKA:    --BMP, Beta hydroxybutyrate ordered stat  --SSI given now  --Zosyn base solution changed to NS  --consider endocrine consult     F/U primary team in AM.    Corine Mendez, [facility]  [id number]

## 2019-09-22 NOTE — CONSULT NOTE ADULT - PROBLEM/RECOMMENDATION-2
Pt amb to triage.  Chief Complaint   Patient presents with   • Shoulder Pain     rt, since friday     Denies injury. States it started w/ a little pain in joint Friday, now much worse. Can't lift arm up +difficulty sleeping.     DISPLAY PLAN FREE TEXT

## 2019-09-22 NOTE — PROVIDER CONTACT NOTE (OTHER) - BACKGROUND
Patient admitted for dyspnea and pneumonia. Hx ESRD, DM type 1, PVD, CHF, CAD, COPD. Due for Zosyn IVPB in dextrose solution.

## 2019-09-22 NOTE — PROGRESS NOTE ADULT - PROBLEM SELECTOR PLAN 1
-test BG AC/HS/2AM  -Monitor on Lantus 9 units QHS  -increase Humalog to 5 units AC meals for now (was on 6 a few days ago during recent hospitalization). If remains elevated today, will continue to titrate up. c/w Humalog 2 units w/HS snack  -c/w Humalog low correction scale AC and Low HS/2AM scale to correct >250mg/dl  Pt to f/u with Dr Ley endocrinologist as out pt.    Pt's family supervises and injects insulin at home since pt doesn't has the ability to manage her DM independently '  Discharge on basaglar 8 units QHS and Humalog 3 units w/meals. May need adjustment up on premeal insulin at home if insulin requirements remain high

## 2019-09-22 NOTE — CONSULT NOTE ADULT - SUBJECTIVE AND OBJECTIVE BOX
[facility]  [phone]  Dr. Urban (covering for Dr. Burger)  NEPHROLOGY  INITIAL CONSULT NOTE  --------------------------------------------------------------------------------  HPI: Patient is a 65 yo F with ESRD (on HD M/Tu/Th/Sa), type 1 DM w/DKA in past, CAD, HFrEF (EF 50% on TTE from 10/18), TIA, and PVD, very recent admission here until two days ago for COPD exacerbation felt 2/2 viral illness from enterovirus now presents with worsening dyspnea and hypoxemia down to 80s (O2 sat 82% on intial ED evaluation per ED provider note).  Patient was able to complete her HD treatment yesterday but was very SOB when arriving at home.  Being treated for HCAP.        PAST HISTORY  --------------------------------------------------------------------------------  PAST MEDICAL & SURGICAL HISTORY:  COPD (chronic obstructive pulmonary disease)  Localized enlarged lymph nodes  CHF (congestive heart failure): EF 40-45%  Subclavian vein stenosis, left: s/p stent  DKA, type 1: 1/2015  ACS (acute coronary syndrome): 1/2015 - cath revealed 100% ostial stenosis not amenable to PCI - medical management  TIA (transient ischemic attack): x 2 - 8-9 years ago prior to ASD/VSD repair  CAD (coronary artery disease): s/p stents  Gout: past  CVA (cerebral infarction): with no residual, 8 yrs ago, prior to heart surgery - ST memory loss  Peripheral vascular disease: occluded left fem-pop bypass 5/2015  Diabetes mellitus type 1: Insulin Dependent -  ESRD (end stage renal disease): dialysis  M, tue, thursday, saturday  Hyperlipidemia  Status post device closure of ASD: &quot;clamshell&quot;  History of cardiac catheterization: 1/2015 - no intervention  S/P femoral-popliteal bypass surgery: L and R in 2013 with graft; 5/2015 CFA angioplasty left and ileofemoral endarterectomywith vein patch angioplasty of left fem-pop bypass graft  Multiple vascular surgery both leg, left fempop bypass revision 11/2015  AV (arteriovenous fistula): Left AV graft; revision with stent placement 2-3 years ago  S/P cholecystectomy    FAMILY HISTORY:  Family history of smoking  Family history of hypertension  Family history of cancer (Sibling)    PAST SOCIAL HISTORY:  Tobacco: Quit 18 years ago  EtOH: Denies  Illicit drug use: Denies    ALLERGIES & MEDICATIONS  --------------------------------------------------------------------------------  Allergies    No Known Allergies    Intolerances      Standing Inpatient Medications  ALBUTerol/ipratropium for Nebulization 3 milliLiter(s) Nebulizer every 6 hours  aspirin enteric coated 81 milliGRAM(s) Oral daily  atorvastatin 40 milliGRAM(s) Oral at bedtime  azithromycin  IVPB      buDESOnide    Inhalation Suspension 0.5 milliGRAM(s) Inhalation two times a day  clopidogrel Tablet 75 milliGRAM(s) Oral daily  dextrose 5%. 1000 milliLiter(s) IV Continuous <Continuous>  dextrose 50% Injectable 12.5 Gram(s) IV Push once  dextrose 50% Injectable 25 Gram(s) IV Push once  dextrose 50% Injectable 25 Gram(s) IV Push once  docusate sodium 100 milliGRAM(s) Oral three times a day  heparin  Injectable 5000 Unit(s) SubCutaneous two times a day  hydrALAZINE 25 milliGRAM(s) Oral three times a day  insulin glargine Injectable (LANTUS) 9 Unit(s) SubCutaneous at bedtime  insulin lispro (HumaLOG) corrective regimen sliding scale   SubCutaneous three times a day before meals  insulin lispro (HumaLOG) corrective regimen sliding scale   SubCutaneous at bedtime  insulin lispro Injectable (HumaLOG) 3 Unit(s) SubCutaneous three times a day before meals  isosorbide   dinitrate Tablet (ISORDIL) 10 milliGRAM(s) Oral three times a day  metoprolol succinate ER 50 milliGRAM(s) Oral daily  Nephro-raphael 1 Tablet(s) Oral daily  pantoprazole    Tablet 40 milliGRAM(s) Oral before breakfast  piperacillin/tazobactam IVPB.. 3.375 Gram(s) IV Intermittent every 12 hours  sevelamer carbonate 800 milliGRAM(s) Oral three times a day with meals    PRN Inpatient Medications  dextrose 40% Gel 15 Gram(s) Oral once PRN  glucagon  Injectable 1 milliGRAM(s) IntraMuscular once PRN  oxyCODONE    5 mG/acetaminophen 325 mG 1 Tablet(s) Oral every 12 hours PRN  senna 2 Tablet(s) Oral at bedtime PRN      REVIEW OF SYSTEMS  --------------------------------------------------------------------------------  General: + subjective fever/rigors  CVS: denies CP/palpitations  Respiratory: +SOB/LIPSCOMB, + cough productive of yellow-green phlegm  GI: denies N/V/Abd pain  : scant urine  Neuro: denies dizziness/lightheadedness    All other systems were reviewed and are negative, except as noted.    VITALS/PHYSICAL EXAM  --------------------------------------------------------------------------------  T(C): 36.3 (09-22-19 @ 12:17), Max: 36.9 (09-22-19 @ 03:11)  HR: 78 (09-22-19 @ 12:17) (78 - 86)  BP: 120/65 (09-22-19 @ 12:17) (111/58 - 142/87)  RR: 17 (09-22-19 @ 12:17) (17 - 27)  SpO2: 100% (09-22-19 @ 12:17) (96% - 100%)  Wt(kg): --  Height (cm): 157.48 (09-21-19 @ 16:06)  Weight (kg): 72.4 (09-21-19 @ 20:48)  BMI (kg/m2): 29.2 (09-21-19 @ 20:48)  BSA (m2): 1.74 (09-21-19 @ 20:48)      09-22-19 @ 07:01  -  09-22-19 @ 12:55  --------------------------------------------------------  IN: 250 mL / OUT: 0 mL / NET: 250 mL      Physical Exam:  	Gen: NAD, frail-appearing  	Pulm: diffuse b/l rhonchi and rales  	CV: RRR, S1S2  	Abd: +BS, soft, nontender/nondistended  	: No suprapubic tenderness, no irene  	Extremity: No LE cyanosis or edema  	Neuro: A&Ox3  	Vascular access: LUE AVF + thrill    LABS/STUDIES  --------------------------------------------------------------------------------              8.9    3.73  >-----------<  206      [09-22-19 @ 09:16]              28.6     134  |  89  |  20  ----------------------------<  436      [09-22-19 @ 06:20]  4.0   |  25  |  3.88        Ca     9.0     [09-22-19 @ 06:20]      Mg     2.1     [09-22-19 @ 06:20]      Phos  4.0     [09-22-19 @ 06:20]    TPro  7.9  /  Alb  4.4  /  TBili  0.3  /  DBili  x   /  AST  19  /  ALT  19  /  AlkPhos  155  [09-21-19 @ 16:49]          eGFR if : 14 mL/min/1.73M2 (09-22 @ 06:20)  eGFR if African American: 20 mL/min/1.73M2 (09-21 @ 16:49)    eGFR if Non African American: 12 mL/min/1.73M2 (09-22 @ 06:20)  eGFR if Non African American: 18 mL/min/1.73M2 (09-21 @ 16:49)    Creatinine Trend:  SCr 3.88 [09-22 @ 06:20]  SCr 2.77 [09-21 @ 16:49]  SCr 3.66 [09-19 @ 07:09]  SCr 4.84 [09-18 @ 06:59]  SCr 2.87 [09-17 @ 00:43]    Urinalysis - [06-04-17 @ 08:24]      Color Yellow / Appearance Clear / SG 1.013 / pH 8.5      Gluc 1000 / Ketone Negative  / Bili Negative / Urobili Negative       Blood Negative / Protein >600 / Leuk Est Small / Nitrite Negative      RBC 3-5 / WBC 6-10 / Hyaline  / Gran  / Sq Epi  / Non Sq Epi OCC / Bacteria       Iron 42, TIBC 253, %sat 17      [06-17-19 @ 17:54]  Ferritin 1838      [06-17-19 @ 18:06]  PTH -- (Ca 9.6)      [06-17-19 @ 18:06]   177  PTH -- (Ca 7.8)      [03-29-19 @ 20:38]   73  PTH -- (Ca 7.3)      [02-22-19 @ 02:49]   59  HbA1c 8.1      [09-16-19 @ 08:04]  TSH 0.71      [11-17-18 @ 08:25]    HBsAb <3.0      [09-15-19 @ 20:15]  HBsAb Nonreact      [09-15-19 @ 20:15]  HBsAg Nonreact      [09-15-19 @ 20:15]  HBcAb Reactive      [09-15-19 @ 20:15]  HCV 0.13, Nonreact      [09-15-19 @ 20:15]

## 2019-09-22 NOTE — CHART NOTE - NSCHARTNOTEFT_GEN_A_CORE
[id number]  NATHAN MEEKS    Notified by RN patient with critical lab result of high sensitivity troponin: 436. Patient resting comfortably in bed, NAD, alert and awake. She denied chest pain, acute dyspnea, palpitations, nausea, vomiting, or abdominal pain.       #Troponinemia in the setting of ESRD  -Review of chart notable for chronically elevated troponin (HsT 398 on 9/15)  -Repeat HsT ordered for 6AM  -EKG in AM  -Patient requires monitoring on telemetry however at this time, PICU does not have capacity therefore will have to transfer patient to telemetry.         Jenny Gar PA-C  Dept of Medicine  [phone]  NATHAN MEEKS    Notified by RN patient with critical lab result of high sensitivity troponin: 436. Patient resting comfortably in bed, NAD, alert and awake. She denied chest pain, acute dyspnea, palpitations, nausea, vomiting, or abdominal pain.     Vital Signs Last 24 Hrs    Physical Exam:  General: Chronically ill-appearing female, NAD, AOx3, nontoxic appearing  Head:  NC/AT  CV: RRR, S1S2   Respiratory: CTA B/L, nonlabored  Skin: (+) warm, dry   Psych: Appropriate affect         #Troponinemia in the setting of ESRD  -Patient asymptomatic, vital signs hemodynamically stable  -Review of chart notable for chronically elevated troponin (HsT 398 on 9/15)  -Repeat HsT ordered for 6AM  -EKG in AM  -Patient requires monitoring on telemetry however at this time, PICU does not have capacity therefore will have to transfer patient to telemetry.         Jenny Gar PA-C  Dept of Medicine  [phone]  NATHAN MEEKS    Notified by RN patient with critical lab result of high sensitivity troponin: 436. Patient resting comfortably in bed, NAD, alert and awake. She denied chest pain, acute dyspnea, palpitations, nausea, vomiting, or abdominal pain.     Vital Signs Last 24 Hrs  T(C): 36.8 (22 Sep 2019 02:07), Max: 36.8 (22 Sep 2019 02:07)  T(F): 98.3 (22 Sep 2019 02:07), Max: 98.3 (22 Sep 2019 02:07)  HR: 83 (22 Sep 2019 02:07) (82 - 86)  BP: 115/65 (22 Sep 2019 02:07) (115/65 - 142/87)  RR: 19 (22 Sep 2019 02:07) (18 - 27)  SpO2: 99% (22 Sep 2019 02:07) (97% - 100%)    Physical Exam:  General: Chronically ill-appearing female, NAD, AOx3, nontoxic appearing  Head:  NC/AT  CV: RRR, S1S2   Respiratory: CTA B/L, nonlabored  Skin: (+) warm, dry   Psych: Appropriate affect         63 yo F with ESRD (on HD M/Tu/Th/Sa), type 1 DM w/DKA in past, CAD, HFrEF (EF 50% on TTE from 10/18), TIA, and PVD, very recent admission here until yesterday for COPD exacerbation felt 2/2 viral illness from enterovirus now presents with worsening dyspnea and hypoxemia down to 80s (O2 sat 82% on intial ED evaluation per ED provider note).     Patient now noted with elevated troponin.     #Troponinemia in the setting of ESRD  -Patient asymptomatic, vital signs hemodynamically stable  -Review of chart notable for chronically elevated troponin (HsT 398 on 9/15)  -Repeat HsT ordered for 6AM  -EKG in AM  -Patient requires monitoring on telemetry however at this time, PICU does not have capacity therefore will have to transfer patient to telemetry.   -Patient placed on bedside cardiac monitor at 02:10 until 03:00, noted with SR 70-80s with occasional PVCs. Patient was transferred to  for cardiac monitoring.         Jenny Gar PA-C  Dept of Medicine  [phone]

## 2019-09-22 NOTE — CONSULT NOTE ADULT - PROBLEM SELECTOR RECOMMENDATION 9
s/p HD yesterday PTA  given persistent dyspnea, will dose short HD today for UF and clearance (s/p CT angio yesterday)  Consent obtained, witnessed, and placed in chart  HD on call nurse paged  dose meds for eGFR <15  continue nephrovite and phos binders for bone/mineral metabolism

## 2019-09-22 NOTE — PROGRESS NOTE ADULT - ASSESSMENT
65y/o female w/h/o uncontrolled TIDM (HbA1c 8.1% 9/19) c/b neuropathy and retinopathy as well as CAD s/p multiple stents, CHF (EF 50% 10/2018), TIA, PVD s/p b/l fem-pop bypass, ESRD> HD, presenting with upper respiratory illness and hyperglycemia. Will continue to increase insulin doses slowly.  BG goal 100s to low 200s on this pt who has multiple DM complications and comorbidities.

## 2019-09-22 NOTE — CONSULT NOTE ADULT - SUBJECTIVE AND OBJECTIVE BOX
HPI:   Patient is a 64y female with ESRD, DM, CAD, severe AS, COPD, PAD- b/l fem pop, hilar adenopathy, here 9/15-9/20 with fever, cough, and lethargy.  CXR no infiltrate, RVP +EV/RV, elevated Trop and BNP;  with same cough, cold Sxs.  Pt's fever resolved promptly, Bld Cxs negative, suspected viral URI, received brief Vanco and Zosyn.  She returned as of last night b/c of worsening SOB, weakness, chills and same cough- at times, yellow sputum.  Afebrile since arrival, given Vanco, Zosyn, Azithro.     REVIEW OF SYSTEMS:  All other review of systems negative (Comprehensive ROS)    PAST MEDICAL & SURGICAL HISTORY:  COPD (chronic obstructive pulmonary disease)  Localized enlarged lymph nodes  CHF (congestive heart failure): EF 40-45%  Subclavian vein stenosis, left: s/p stent  DKA, type 1: 1/2015  ACS (acute coronary syndrome): 1/2015 - cath revealed 100% ostial stenosis not amenable to PCI - medical management  TIA (transient ischemic attack): x 2 - 8-9 years ago prior to ASD/VSD repair  CAD (coronary artery disease): s/p stents  Gout: past  CVA (cerebral infarction): with no residual, 8 yrs ago, prior to heart surgery - ST memory loss  Peripheral vascular disease: occluded left fem-pop bypass 5/2015  Diabetes mellitus type 1: Insulin Dependent -  ESRD (end stage renal disease): dialysis  M, tue, thursday, saturday  Hyperlipidemia  Status post device closure of ASD: &quot;brenna&quot;  History of cardiac catheterization: 1/2015 - no intervention  S/P femoral-popliteal bypass surgery: L and R in 2013 with graft; 5/2015 CFA angioplasty left and ileofemoral endarterectomywith vein patch angioplasty of left fem-pop bypass graft  Multiple vascular surgery both leg, left fempop bypass revision 11/2015  AV (arteriovenous fistula): Left AV graft; revision with stent placement 2-3 years ago  S/P cholecystectomy      Allergies  No Known Allergies    Antimicrobials Day # 1   azithromycin  IVPB      piperacillin/tazobactam IVPB.. 3.375 Gram(s) IV Intermittent every 12 hours    Other Medications:  ALBUTerol/ipratropium for Nebulization 3 milliLiter(s) Nebulizer every 6 hours  aspirin enteric coated 81 milliGRAM(s) Oral daily  atorvastatin 40 milliGRAM(s) Oral at bedtime  buDESOnide    Inhalation Suspension 0.5 milliGRAM(s) Inhalation two times a day  clopidogrel Tablet 75 milliGRAM(s) Oral daily  dextrose 40% Gel 15 Gram(s) Oral once PRN  dextrose 5%. 1000 milliLiter(s) IV Continuous <Continuous>  dextrose 50% Injectable 12.5 Gram(s) IV Push once  dextrose 50% Injectable 25 Gram(s) IV Push once  dextrose 50% Injectable 25 Gram(s) IV Push once  docusate sodium 100 milliGRAM(s) Oral three times a day  glucagon  Injectable 1 milliGRAM(s) IntraMuscular once PRN  heparin  Injectable 5000 Unit(s) SubCutaneous two times a day  hydrALAZINE 25 milliGRAM(s) Oral three times a day  insulin glargine Injectable (LANTUS) 9 Unit(s) SubCutaneous at bedtime  insulin lispro (HumaLOG) corrective regimen sliding scale   SubCutaneous three times a day before meals  insulin lispro (HumaLOG) corrective regimen sliding scale   SubCutaneous at bedtime  insulin lispro Injectable (HumaLOG) 5 Unit(s) SubCutaneous three times a day before meals  insulin lispro Injectable (HumaLOG) 2 Unit(s) SubCutaneous at bedtime  isosorbide   dinitrate Tablet (ISORDIL) 10 milliGRAM(s) Oral three times a day  metoprolol succinate ER 50 milliGRAM(s) Oral daily  Nephro-raphael 1 Tablet(s) Oral daily  oxyCODONE    5 mG/acetaminophen 325 mG 1 Tablet(s) Oral every 12 hours PRN  pantoprazole    Tablet 40 milliGRAM(s) Oral before breakfast  senna 2 Tablet(s) Oral at bedtime PRN  sevelamer carbonate 800 milliGRAM(s) Oral three times a day with meals      FAMILY HISTORY:  Family history of smoking  Family history of hypertension  Family history of cancer (Sibling)      SOCIAL HISTORY:  Smoking: former    ETOH: no           T(F): 97.3 (09-22-19 @ 12:17), Max: 98.5 (09-22-19 @ 03:11)  HR: 83 (09-22-19 @ 13:15)  BP: 111/65 (09-22-19 @ 13:15)  RR: 17 (09-22-19 @ 12:17)  SpO2: 100% (09-22-19 @ 12:17)  Wt(kg): --    PHYSICAL EXAM:  General: no acute distress  Eyes: anicteric, no conjunctival injection, no discharge  Oropharynx: no lesions or injection 	  Neck: supple, without adenopathy  Lungs: coarse b/l BSs, faint wheeze  Heart: regular rate and rhythm; systolic murmur  Abdomen: soft, nondistended, nontender, without mass or organomegaly  Skin: no lesions  Extremities: no edema.  L AVF  Neurologic: somnolent, moves all extremities    LAB RESULTS:                        8.9    3.73  )-----------( 206      ( 22 Sep 2019 09:16 )             28.6     09-22    134<L>  |  89<L>  |  20  ----------------------------<  436<H>  4.0   |  25  |  3.88<H>    Ca    9.0      22 Sep 2019 06:20  Phos  4.0     09-22  Mg     2.1     09-22    TPro  7.9  /  Alb  4.4  /  TBili  0.3  /  DBili  x   /  AST  19  /  ALT  19  /  AlkPhos  155<H>  09-21    MICROBIOLOGY:  RECENT CULTURES:  Pending    Rapid Respiratory Viral Panel (09.21.19 @ 16:49)    Rapid RVP Result: Entero/Rhinovirus (RapRVP): Detected    RADIOLOGY REVIEWED:  CT Angio Chest w/ IV Cont (09.21.19 @ 18:27) >  1. No pulmonary embolism.  2. Branching "tree-in-bud" airspace opacities in bilateral lower lobes,   right middle lobe and lingula likely infectious in etiology.  3. Focal groundglass airspace opacities in bilateral upper lobes, not   significant changed dating back to at least a CT of the chest from   10/2/2017, which could represent groundglass nodules.

## 2019-09-23 LAB
ANION GAP SERPL CALC-SCNC: 16 MMOL/L — SIGNIFICANT CHANGE UP (ref 5–17)
BUN SERPL-MCNC: 16 MG/DL — SIGNIFICANT CHANGE UP (ref 7–23)
CALCIUM SERPL-MCNC: 9.5 MG/DL — SIGNIFICANT CHANGE UP (ref 8.4–10.5)
CHLORIDE SERPL-SCNC: 95 MMOL/L — LOW (ref 96–108)
CO2 SERPL-SCNC: 27 MMOL/L — SIGNIFICANT CHANGE UP (ref 22–31)
CREAT SERPL-MCNC: 4.04 MG/DL — HIGH (ref 0.5–1.3)
GLUCOSE BLDC GLUCOMTR-MCNC: 134 MG/DL — HIGH (ref 70–99)
GLUCOSE BLDC GLUCOMTR-MCNC: 137 MG/DL — HIGH (ref 70–99)
GLUCOSE BLDC GLUCOMTR-MCNC: 189 MG/DL — HIGH (ref 70–99)
GLUCOSE BLDC GLUCOMTR-MCNC: 199 MG/DL — HIGH (ref 70–99)
GLUCOSE BLDC GLUCOMTR-MCNC: 341 MG/DL — HIGH (ref 70–99)
GLUCOSE SERPL-MCNC: 283 MG/DL — HIGH (ref 70–99)
HCT VFR BLD CALC: 30 % — LOW (ref 34.5–45)
HGB BLD-MCNC: 9.4 G/DL — LOW (ref 11.5–15.5)
MAGNESIUM SERPL-MCNC: 2.1 MG/DL — SIGNIFICANT CHANGE UP (ref 1.6–2.6)
MCHC RBC-ENTMCNC: 31.3 GM/DL — LOW (ref 32–36)
MCHC RBC-ENTMCNC: 33.3 PG — SIGNIFICANT CHANGE UP (ref 27–34)
MCV RBC AUTO: 106 FL — HIGH (ref 80–100)
PHOSPHATE SERPL-MCNC: 4.1 MG/DL — SIGNIFICANT CHANGE UP (ref 2.5–4.5)
PLATELET # BLD AUTO: 241 K/UL — SIGNIFICANT CHANGE UP (ref 150–400)
POTASSIUM SERPL-MCNC: 3.8 MMOL/L — SIGNIFICANT CHANGE UP (ref 3.5–5.3)
POTASSIUM SERPL-SCNC: 3.8 MMOL/L — SIGNIFICANT CHANGE UP (ref 3.5–5.3)
RBC # BLD: 2.82 M/UL — LOW (ref 3.8–5.2)
RBC # FLD: 13.2 % — SIGNIFICANT CHANGE UP (ref 10.3–14.5)
SODIUM SERPL-SCNC: 138 MMOL/L — SIGNIFICANT CHANGE UP (ref 135–145)
VANCOMYCIN FLD-MCNC: 16.1 UG/ML — SIGNIFICANT CHANGE UP
WBC # BLD: 7.1 K/UL — SIGNIFICANT CHANGE UP (ref 3.8–10.5)
WBC # FLD AUTO: 7.1 K/UL — SIGNIFICANT CHANGE UP (ref 3.8–10.5)

## 2019-09-23 PROCEDURE — 99232 SBSQ HOSP IP/OBS MODERATE 35: CPT

## 2019-09-23 RX ORDER — INSULIN LISPRO 100/ML
VIAL (ML) SUBCUTANEOUS
Refills: 0 | Status: DISCONTINUED | OUTPATIENT
Start: 2019-09-23 | End: 2019-09-24

## 2019-09-23 RX ORDER — IPRATROPIUM BROMIDE 0.2 MG/ML
500 SOLUTION, NON-ORAL INHALATION ONCE
Refills: 0 | Status: COMPLETED | OUTPATIENT
Start: 2019-09-23 | End: 2019-09-23

## 2019-09-23 RX ORDER — ERYTHROPOIETIN 10000 [IU]/ML
10000 INJECTION, SOLUTION INTRAVENOUS; SUBCUTANEOUS ONCE
Refills: 0 | Status: COMPLETED | OUTPATIENT
Start: 2019-09-23 | End: 2019-09-23

## 2019-09-23 RX ORDER — CHLORHEXIDINE GLUCONATE 213 G/1000ML
1 SOLUTION TOPICAL
Refills: 0 | Status: DISCONTINUED | OUTPATIENT
Start: 2019-09-23 | End: 2019-09-30

## 2019-09-23 RX ORDER — OXYCODONE AND ACETAMINOPHEN 5; 325 MG/1; MG/1
1 TABLET ORAL EVERY 6 HOURS
Refills: 0 | Status: DISCONTINUED | OUTPATIENT
Start: 2019-09-23 | End: 2019-09-30

## 2019-09-23 RX ADMIN — Medication 5 UNIT(S): at 18:16

## 2019-09-23 RX ADMIN — SEVELAMER CARBONATE 800 MILLIGRAM(S): 2400 POWDER, FOR SUSPENSION ORAL at 18:16

## 2019-09-23 RX ADMIN — Medication 3 MILLILITER(S): at 18:16

## 2019-09-23 RX ADMIN — CEFEPIME 100 MILLIGRAM(S): 1 INJECTION, POWDER, FOR SOLUTION INTRAMUSCULAR; INTRAVENOUS at 18:17

## 2019-09-23 RX ADMIN — Medication 3 MILLILITER(S): at 12:01

## 2019-09-23 RX ADMIN — Medication 40 MILLIGRAM(S): at 19:25

## 2019-09-23 RX ADMIN — INSULIN GLARGINE 9 UNIT(S): 100 INJECTION, SOLUTION SUBCUTANEOUS at 21:40

## 2019-09-23 RX ADMIN — SEVELAMER CARBONATE 800 MILLIGRAM(S): 2400 POWDER, FOR SUSPENSION ORAL at 08:22

## 2019-09-23 RX ADMIN — Medication 1: at 12:00

## 2019-09-23 RX ADMIN — ISOSORBIDE DINITRATE 10 MILLIGRAM(S): 5 TABLET ORAL at 21:55

## 2019-09-23 RX ADMIN — Medication 5 UNIT(S): at 12:00

## 2019-09-23 RX ADMIN — Medication 0.5 MILLIGRAM(S): at 18:16

## 2019-09-23 RX ADMIN — ERYTHROPOIETIN 10000 UNIT(S): 10000 INJECTION, SOLUTION INTRAVENOUS; SUBCUTANEOUS at 15:47

## 2019-09-23 RX ADMIN — Medication 4: at 08:22

## 2019-09-23 RX ADMIN — Medication 1 TABLET(S): at 11:28

## 2019-09-23 RX ADMIN — PANTOPRAZOLE SODIUM 40 MILLIGRAM(S): 20 TABLET, DELAYED RELEASE ORAL at 06:13

## 2019-09-23 RX ADMIN — Medication 3 MILLILITER(S): at 06:13

## 2019-09-23 RX ADMIN — Medication 81 MILLIGRAM(S): at 11:28

## 2019-09-23 RX ADMIN — ATORVASTATIN CALCIUM 40 MILLIGRAM(S): 80 TABLET, FILM COATED ORAL at 21:55

## 2019-09-23 RX ADMIN — Medication 0.5 MILLIGRAM(S): at 06:13

## 2019-09-23 RX ADMIN — CLOPIDOGREL BISULFATE 75 MILLIGRAM(S): 75 TABLET, FILM COATED ORAL at 11:28

## 2019-09-23 RX ADMIN — Medication 5 UNIT(S): at 08:22

## 2019-09-23 RX ADMIN — SEVELAMER CARBONATE 800 MILLIGRAM(S): 2400 POWDER, FOR SUSPENSION ORAL at 12:01

## 2019-09-23 RX ADMIN — Medication 25 MILLIGRAM(S): at 21:55

## 2019-09-23 RX ADMIN — ISOSORBIDE DINITRATE 10 MILLIGRAM(S): 5 TABLET ORAL at 06:13

## 2019-09-23 NOTE — PROGRESS NOTE ADULT - ASSESSMENT
64y female with DM, CAD, ESRD, CHF, MR,pseudo-severe AS, COPD, PVD s/p b/l fem-pop, hilar adenopathy,  recently here with fever and viral URI.  Brief abx given, pt remained afebrile, resp status stable.  She returns with increasing SOB, weakness, chills, persistent cough.  Afebrile.  WBC slightly low with normal diff.  Trop still elevated.  Thus, difficult to be absolute abt prospect of new infection such as post viral pneumonia vs continuum of same process (PCR still with same EV/RV), now with component of CHF.  CT findings noted; again, ?of new infection/pneumonia or lag- CT not performed on recent admission.She claims to be at baseline.  Plan:  For any question of post viral pneumonia, will follow on Cefepime 1 g daily  Vanco level 16, we can give 1 gr after her next dialysis  Doubt atypical process, d/c Azithro  Check MRSA screen, still pending  Follow temps and CBC/diff   ? If she will need oxygen at home

## 2019-09-23 NOTE — PROGRESS NOTE ADULT - ASSESSMENT
· Assessment		  65 yo F with ESRD (on HD M/Tu/Th/Sa), type 1 DM w/DKA in past, CAD, HFrEF (EF 50% on TTE from 10/18), TIA, and PVD, very recent admission here until yesterday for COPD exacerbation felt 2/2 viral illness from enterovirus now presents with worsening dyspnea and hypoxemia down to 80s (O2 sat 82% on intial ED evaluation per ED provider note).       Pneumonia of left lower lobe due to infectious organism.   - Concern for HCAP given multiple recent hospitalizations in setting of known viral URI.  Vancomycin by level  Zosyn 3.375g q12h  O2 supplemental PRN.  ID follow  Pulmonary follow     Chronic obstructive pulmonary disease, unspecified COPD type.   Duonebs q6h ATC  budesonide nebulizers q12h    EKG abnormalities.   Findings of lateral TWI on EKG. May be related to hypoxemia exacerbating underlying CAD. Pt with chronically elevated troponin T.  Cardiology follow Dr. Biswas    Type 1 diabetes mellitus with chronic kidney disease on chronic dialysis.   Lantus 9 units qhs  Humalog 3 units TID AC  ISS.   Endocrine evaluation noted    Chronic congestive heart failure, unspecified heart failure type.  Appears euvolemic at this time  Volume management via HD while inpatient, did receive HD already today  renal consult for HD.     Coronary artery disease involving native coronary artery of native heart without angina pectoris.   atorvastatin  Aspirin  Plavix    ESRD  HD  Nephrology evaluation Dr. Brayan Viera MD pager [phone]

## 2019-09-23 NOTE — PROGRESS NOTE ADULT - SUBJECTIVE AND OBJECTIVE BOX
PULMONARY PROGRESS NOTE    NATHAN MEEKS  MRN-[id number]    Patient is a 64y old  Female who presents with a chief complaint of Hypoxemia 2/2 healthcare associated pneumonia (23 Sep 2019 10:07)      HPI:  on NC  coughing + productive sputum    ROS:   -    ACTIVE MEDICATION LIST:  MEDICATIONS  (STANDING):  ALBUTerol/ipratropium for Nebulization 3 milliLiter(s) Nebulizer every 6 hours  aspirin enteric coated 81 milliGRAM(s) Oral daily  atorvastatin 40 milliGRAM(s) Oral at bedtime  buDESOnide    Inhalation Suspension 0.5 milliGRAM(s) Inhalation two times a day  cefepime   IVPB 1000 milliGRAM(s) IV Intermittent every 24 hours  chlorhexidine 2% Cloths 1 Application(s) Topical <User Schedule>  clopidogrel Tablet 75 milliGRAM(s) Oral daily  dextrose 5%. 1000 milliLiter(s) (50 mL/Hr) IV Continuous <Continuous>  dextrose 50% Injectable 12.5 Gram(s) IV Push once  dextrose 50% Injectable 25 Gram(s) IV Push once  dextrose 50% Injectable 25 Gram(s) IV Push once  docusate sodium 100 milliGRAM(s) Oral three times a day  epoetin azalea Injectable 39745 Unit(s) IV Push once  heparin  Injectable 5000 Unit(s) SubCutaneous two times a day  hydrALAZINE 25 milliGRAM(s) Oral three times a day  insulin glargine Injectable (LANTUS) 9 Unit(s) SubCutaneous at bedtime  insulin lispro (HumaLOG) corrective regimen sliding scale   SubCutaneous three times a day before meals  insulin lispro (HumaLOG) corrective regimen sliding scale   SubCutaneous at bedtime  insulin lispro Injectable (HumaLOG) 5 Unit(s) SubCutaneous three times a day before meals  insulin lispro Injectable (HumaLOG) 2 Unit(s) SubCutaneous at bedtime  isosorbide   dinitrate Tablet (ISORDIL) 10 milliGRAM(s) Oral three times a day  metoprolol succinate ER 50 milliGRAM(s) Oral daily  Nephro-raphael 1 Tablet(s) Oral daily  pantoprazole    Tablet 40 milliGRAM(s) Oral before breakfast  sevelamer carbonate 800 milliGRAM(s) Oral three times a day with meals    MEDICATIONS  (PRN):  dextrose 40% Gel 15 Gram(s) Oral once PRN Blood Glucose LESS THAN 70 milliGRAM(s)/deciliter  glucagon  Injectable 1 milliGRAM(s) IntraMuscular once PRN Glucose LESS THAN 70 milligrams/deciliter  oxyCODONE    5 mG/acetaminophen 325 mG 1 Tablet(s) Oral every 6 hours PRN Severe Pain (7 - 10)  senna 2 Tablet(s) Oral at bedtime PRN Constipation      EXAM:  Vital Signs Last 24 Hrs  T(C): 36.9 (23 Sep 2019 11:13), Max: 36.9 (22 Sep 2019 15:50)  T(F): 98.5 (23 Sep 2019 11:13), Max: 98.5 (22 Sep 2019 15:50)  HR: 84 (23 Sep 2019 11:13) (70 - 86)  BP: 109/75 (23 Sep 2019 11:13) (99/58 - 134/75)  BP(mean): --  RR: 18 (23 Sep 2019 11:13) (16 - 18)  SpO2: 96% (23 Sep 2019 11:13) (93% - 100%)    GENERAL: The patient is awake and alert in no apparent distress.     LUNGS:  trace expiratory wheeze L>R ; respirations unlabored    HEART: Regular rate and rhythm without murmur.                            9.4    7.1   )-----------( 241      ( 23 Sep 2019 05:12 )             30.0       09-23    138  |  95<L>  |  16  ----------------------------<  283<H>  3.8   |  27  |  4.04<H>    Ca    9.5      23 Sep 2019 05:12  Phos  4.1     09-23  Mg     2.1     09-23    TPro  7.9  /  Alb  4.4  /  TBili  0.3  /  DBili  x   /  AST  19  /  ALT  19  /  AlkPhos  155<H>  09-21        PROBLEM LIST:  64y Female with HEALTH ISSUES - PROBLEM Dx:  Dyspnea: Dyspnea  ESRD (end stage renal disease): ESRD (end stage renal disease)  Coronary artery disease involving native coronary artery of native heart without angina pectoris: Coronary artery disease involving native coronary artery of native heart without angina pectoris  Chronic congestive heart failure, unspecified heart failure type: Chronic congestive heart failure, unspecified heart failure type  Type 1 diabetes mellitus with chronic kidney disease on chronic dialysis: Type 1 diabetes mellitus with chronic kidney disease on chronic dialysis  EKG abnormalities: EKG abnormalities  Chronic obstructive pulmonary disease, unspecified COPD type: Chronic obstructive pulmonary disease, unspecified COPD type  Pneumonia of left lower lobe due to infectious organism: Pneumonia of left lower lobe due to infectious organism            RECS:  check sputum culture  neb Q6hrs  continue budesonide BID  prednisone 40mg X 5 days  rest of care as per primary team         Please call with any questions.    Chelle Kapoor DO  [facility] Pulmonary/Sleep Medicine  [phone]

## 2019-09-23 NOTE — PROGRESS NOTE ADULT - ASSESSMENT
63 y/o F w/ PMHx ESRD (HD M/T/T/Sat), IDDM, CHF, CAD, ischemic cardiomyopathy presents to the ED BIBA for fever. pt also in DKA, hyperglycemia and now with hyperkalemia as well as DKA    1- esrd  2- hyperkalemia resolved   3- DKA  4- HTN   5- chf      hd  3.5 hr 2.3 liter fluid removal 2k bfr 400 dfr 600 F 160   epogen 33771 U ivp   hydralazine 25 mg tid   renvela one tab with meals for shpt

## 2019-09-23 NOTE — PROGRESS NOTE ADULT - SUBJECTIVE AND OBJECTIVE BOX
[facility]   [phone]  DIALYSIS NOTE  Chief Complaint: ESRD/Ongoing hemodialysis requirement.     24 hour events/subjective:    seen earlier. c/o sob         ALLERGIES & MEDICATIONS  --------------------------------------------------------------------------------  Allergies    No Known Allergies    Intolerances      Standing Inpatient Medications  ALBUTerol/ipratropium for Nebulization 3 milliLiter(s) Nebulizer every 6 hours  aspirin enteric coated 81 milliGRAM(s) Oral daily  atorvastatin 40 milliGRAM(s) Oral at bedtime  buDESOnide    Inhalation Suspension 0.5 milliGRAM(s) Inhalation two times a day  cefepime   IVPB 1000 milliGRAM(s) IV Intermittent every 24 hours  chlorhexidine 2% Cloths 1 Application(s) Topical <User Schedule>  clopidogrel Tablet 75 milliGRAM(s) Oral daily  dextrose 5%. 1000 milliLiter(s) IV Continuous <Continuous>  dextrose 50% Injectable 12.5 Gram(s) IV Push once  dextrose 50% Injectable 25 Gram(s) IV Push once  dextrose 50% Injectable 25 Gram(s) IV Push once  docusate sodium 100 milliGRAM(s) Oral three times a day  heparin  Injectable 5000 Unit(s) SubCutaneous two times a day  hydrALAZINE 25 milliGRAM(s) Oral three times a day  insulin glargine Injectable (LANTUS) 9 Unit(s) SubCutaneous at bedtime  insulin lispro (HumaLOG) corrective regimen sliding scale   SubCutaneous three times a day before meals  insulin lispro (HumaLOG) corrective regimen sliding scale   SubCutaneous at bedtime  insulin lispro (HumaLOG) corrective regimen sliding scale   SubCutaneous <User Schedule>  insulin lispro Injectable (HumaLOG) 5 Unit(s) SubCutaneous three times a day before meals  insulin lispro Injectable (HumaLOG) 2 Unit(s) SubCutaneous at bedtime  isosorbide   dinitrate Tablet (ISORDIL) 10 milliGRAM(s) Oral three times a day  metoprolol succinate ER 50 milliGRAM(s) Oral daily  Nephro-raphael 1 Tablet(s) Oral daily  pantoprazole    Tablet 40 milliGRAM(s) Oral before breakfast  sevelamer carbonate 800 milliGRAM(s) Oral three times a day with meals    PRN Inpatient Medications  dextrose 40% Gel 15 Gram(s) Oral once PRN  glucagon  Injectable 1 milliGRAM(s) IntraMuscular once PRN  oxyCODONE    5 mG/acetaminophen 325 mG 1 Tablet(s) Oral every 6 hours PRN  senna 2 Tablet(s) Oral at bedtime PRN      REVIEW OF SYSTEMS  --------------------------------------------------------------------------------  no itching or rash  no fever or chill  no cp or palp    + sob  + cough   no N/V/D/ no abd pain   ext no edema        VITALS/PHYSICAL EXAM  --------------------------------------------------------------------------------  T(C): 36.4 (09-23-19 @ 17:21), Max: 36.9 (09-23-19 @ 11:13)  HR: 87 (09-23-19 @ 21:53) (76 - 987)  BP: 122/69 (09-23-19 @ 21:53) (106/59 - 155/81)  RR: 18 (09-23-19 @ 13:50) (16 - 18)  SpO2: 97% (09-23-19 @ 13:50) (93% - 97%)  Wt(kg): --        09-22-19 @ 07:01  -  09-23-19 @ 07:00  --------------------------------------------------------  IN: 2070 mL / OUT: 1701 mL / NET: 369 mL    09-23-19 @ 07:01  -  09-23-19 @ 22:13  --------------------------------------------------------  IN: 600 mL / OUT: 2701 mL / NET: -2101 mL      Physical Exam:  		  Gen: chronically  ill appearing   	+ JVD  	Pulm: decrease bs coarse bs no  wheezing   	CV: RRR, S1S2; no rub  	Abd: +BS, soft, nontender/nondistended  	: No suprapubic tenderness  	UE: Warm, no cyanosis  no clubbing,  no edema  	LE: Warm, no cyanosis  no clubbing, 2+ pitting LLE > RLE  edema  	Neuro: alert and oriented   	Vascular access: + avf + bruit and thrill 	    	  LABS/STUDIES  --------------------------------------------------------------------------------              9.4    7.1   >-----------<  241      [09-23-19 @ 05:12]              30.0     138  |  95  |  16  ----------------------------<  283      [09-23-19 @ 05:12]  3.8   |  27  |  4.04        Ca     9.5     [09-23-19 @ 05:12]      Mg     2.1     [09-23-19 @ 05:12]      Phos  4.1     [09-23-19 @ 05:12]                    imp/suggest: ESRD      Hemodialysis Prescription:  	Access:  	Dialyzer: revaclear   	Blood Flow (mL/Min): 400  	Dialysate Flow (mL/Min): 600  	Target UF (Liters):  	Treatment Time:  	Potassium:   	Calcium: 2.5  	  YOLANDA    Vitamin D     continue with hd   see hd flow sheet

## 2019-09-23 NOTE — CONSULT NOTE ADULT - SUBJECTIVE AND OBJECTIVE BOX
CHIEF COMPLAINT: sob    HISTORY OF PRESENT ILLNESS:  65 yo F with ESRD (on HD M/Tu/Th/Sa), type 1 DM w/DKA in past, CAD, HFrEF (EF 50% on TTE from 10/18), TIA, and PVD, very recent admission here until yesterday for COPD exacerbation felt 2/2 viral illness from enterovirus now presents with worsening dyspnea and hypoxemia down to 80s (O2 sat 82% on intial ED evaluation per ED provider note). Patient reports that she had been discharged home yesterday and today went for her usual dialysis. However at dialysis felt short of breath and required O2 via NC throughout dialysis session. After dialysis session patient took a car home but did not have O2 supplementation and by the time she reached home she reported that she was more dyspneic and felt very weak-so weak that she could not get out of the car and her son had to help her get out. +chills. Patient reports that chills have been ongoing for a week as has a cough productive of yellowish to green sputum. Worsened weakness and worsened sats are new. No chest pain, no palpitations. No diarrhea. No nausea/vomiting. No focal neurologic symptoms. No skin rash. No headache. +runny nose x 1 week. No sore throat. 	    No family hx relevant to current admission for health associated pna        Allergies  No Known Allergies    MEDICATIONS:  aspirin enteric coated 81 milliGRAM(s) Oral daily  clopidogrel Tablet 75 milliGRAM(s) Oral daily  heparin  Injectable 5000 Unit(s) SubCutaneous two times a day  hydrALAZINE 25 milliGRAM(s) Oral three times a day  isosorbide   dinitrate Tablet (ISORDIL) 10 milliGRAM(s) Oral three times a day  metoprolol succinate ER 50 milliGRAM(s) Oral daily  cefepime   IVPB 1000 milliGRAM(s) IV Intermittent every 24 hours  ALBUTerol/ipratropium for Nebulization 3 milliLiter(s) Nebulizer every 6 hours  buDESOnide    Inhalation Suspension 0.5 milliGRAM(s) Inhalation two times a day  oxyCODONE    5 mG/acetaminophen 325 mG 1 Tablet(s) Oral every 12 hours PRN  docusate sodium 100 milliGRAM(s) Oral three times a day  pantoprazole    Tablet 40 milliGRAM(s) Oral before breakfast  senna 2 Tablet(s) Oral at bedtime PRN  atorvastatin 40 milliGRAM(s) Oral at bedtime  dextrose 40% Gel 15 Gram(s) Oral once PRN  dextrose 50% Injectable 12.5 Gram(s) IV Push once  dextrose 50% Injectable 25 Gram(s) IV Push once  dextrose 50% Injectable 25 Gram(s) IV Push once  glucagon  Injectable 1 milliGRAM(s) IntraMuscular once PRN  insulin glargine Injectable (LANTUS) 9 Unit(s) SubCutaneous at bedtime  insulin lispro (HumaLOG) corrective regimen sliding scale   SubCutaneous three times a day before meals  insulin lispro (HumaLOG) corrective regimen sliding scale   SubCutaneous at bedtime  insulin lispro Injectable (HumaLOG) 5 Unit(s) SubCutaneous three times a day before meals  insulin lispro Injectable (HumaLOG) 2 Unit(s) SubCutaneous at bedtime  dextrose 5%. 1000 milliLiter(s) IV Continuous <Continuous>  Nephro-raphael 1 Tablet(s) Oral daily      PAST MEDICAL & SURGICAL HISTORY:  COPD (chronic obstructive pulmonary disease)  Localized enlarged lymph nodes  CHF (congestive heart failure): EF 40-45%  Subclavian vein stenosis, left: s/p stent  DKA, type 1: 1/2015  ACS (acute coronary syndrome): 1/2015 - cath revealed 100% ostial stenosis not amenable to PCI - medical management  TIA (transient ischemic attack): x 2 - 8-9 years ago prior to ASD/VSD repair  CAD (coronary artery disease): s/p stents  Gout: past  CVA (cerebral infarction): with no residual, 8 yrs ago, prior to heart surgery - ST memory loss  Peripheral vascular disease: occluded left fem-pop bypass 5/2015  Diabetes mellitus type 1: Insulin Dependent -  ESRD (end stage renal disease): dialysis  M, tue, thursday, saturday  Hyperlipidemia  Status post device closure of ASD: &quot;clamshell&quot;  History of cardiac catheterization: 1/2015 - no intervention  S/P femoral-popliteal bypass surgery: L and R in 2013 with graft; 5/2015 CFA angioplasty left and ileofemoral endarterectomywith vein patch angioplasty of left fem-pop bypass graft  Multiple vascular surgery both leg, left fempop bypass revision 11/2015  AV (arteriovenous fistula): Left AV graft; revision with stent placement 2-3 years ago  S/P cholecystectomy      FAMILY HISTORY:  Family history of smoking  Family history of hypertension  Family history of cancer (Sibling)      SOCIAL HISTORY:    former smoker. independent in adl      REVIEW OF SYSTEMS:  See HPI, otherwise complete 10 point review of systems negative    [ ] All others negative	    PHYSICAL EXAM:  T(C): 36.7 (09-23-19 @ 04:17), Max: 36.9 (09-22-19 @ 15:50)  HR: 84 (09-23-19 @ 04:17) (70 - 86)  BP: 116/59 (09-23-19 @ 04:17) (99/58 - 134/75)  RR: 16 (09-23-19 @ 04:17) (16 - 18)  SpO2: 95% (09-23-19 @ 04:17) (93% - 100%)  Wt(kg): --  I&O's Summary    22 Sep 2019 07:01  -  23 Sep 2019 07:00  --------------------------------------------------------  IN: 2070 mL / OUT: 1701 mL / NET: 369 mL        Appearance: No Acute Distress	  HEENT:  Normal oral mucosa, PERRL, EOMI	  Cardiovascular: Normal S1 S2, No JVD, No murmurs/rubs/gallops  Respiratory: Lungs clear to auscultation bilaterally  Gastrointestinal:  Soft, Non-tender, + BS	  Skin: No rashes, No ecchymoses, No cyanosis	  Neurologic: Non-focal  Extremities: No clubbing, cyanosis or edema  Vascular: Peripheral pulses palpable 2+ bilaterally  Psychiatry: A & O x 3, Mood & affect appropriate    Laboratory Data:	 	    CBC Full  -  ( 23 Sep 2019 05:12 )  WBC Count : 7.1 K/uL  Hemoglobin : 9.4 g/dL  Hematocrit : 30.0 %  Platelet Count - Automated : 241 K/uL  Mean Cell Volume : 106.0 fl  Mean Cell Hemoglobin : 33.3 pg  Mean Cell Hemoglobin Concentration : 31.3 gm/dL  Auto Neutrophil # : x  Auto Lymphocyte # : x  Auto Monocyte # : x  Auto Eosinophil # : x  Auto Basophil # : x  Auto Neutrophil % : x  Auto Lymphocyte % : x  Auto Monocyte % : x  Auto Eosinophil % : x  Auto Basophil % : x    09-23    138  |  95<L>  |  16  ----------------------------<  283<H>  3.8   |  27  |  4.04<H>  09-22    134<L>  |  89<L>  |  20  ----------------------------<  436<H>  4.0   |  25  |  3.88<H>    Ca    9.5      23 Sep 2019 05:12  Ca    9.0      22 Sep 2019 06:20  Phos  4.1     09-23  Phos  4.0     09-22  Mg     2.1     09-23  Mg     2.1     09-22    TPro  7.9  /  Alb  4.4  /  TBili  0.3  /  DBili  x   /  AST  19  /  ALT  19  /  AlkPhos  155<H>  09-21        Interpretation of Telemetry: 	 nsr, brief pAT   ECG:  	nsr nonspecific st changes      Assessment:  -SOB  -pAT  -HCAP - recent admission for viral URI/RVP +  -elevated but stable cardiac enzymes (hs trop) with recent admission with relatively preserved ck/ck mb fraction and no obvious ischemic changes on ekg  -chest pain with mild ekg changes and stable hs trop  -NSVT  -pAT  -volume overload  -ischemic cardiomyopathy, cad s/p multiple stents  -esrd on hd  -PAD s/p prior intervention         Recs:  -c/w supportive care/abx for HCAP  -known extensive CAD and ICM. s/p [facility] [place] 2/20/2019 --> severe and diffuse instent restenosis along RCA --> unable to stent due to multiple layers of stenting and prior brachytherapy  -will consider complex intervention if true ischemic and refractory sx develop   -c/w beta blockers for nsvt/pat/cad (as above). currently on toprol 50mg, isordil 10mg tid, hydral 25mg tid. uptitrate GDMT as tolerates  -hx of prolonged qtc. avoid qtc prolonging meds  -s/p TTE 2019: mod-severe MR, pseudo AS (low flow-low gradient), mod-severe LV dysfunction --> recent CTS consult with dr hebert appreciated. plan is for medical management given surgical risk and patient preference  -c/w asa, plavix, statin for ischemic cardiomyopathy/CAD/PAD  -dvt ppx      Greater than 50 minutes spent on total encounter; more than 50% of the visit was spent counseling and/or coordinating care by the attending physician.   	  Julián Biswas MD   Cardiovascular Diseases  [phone]

## 2019-09-23 NOTE — PROGRESS NOTE ADULT - ASSESSMENT
65y/o female w/h/o uncontrolled TIDM (HbA1c 8.1% 9/19) c/b neuropathy and retinopathy as well as CAD s/p multiple stents, CHF (EF 50% 10/2018), TIA, PVD s/p b/l fem-pop bypass, ESRD> HD, presenting with upper respiratory illness and hyperglycemia.

## 2019-09-23 NOTE — PROGRESS NOTE ADULT - PROBLEM SELECTOR PLAN 1
-test BG AC/HS/2AM  -Continue Lantus 9 units SQ QHS - will check a 2am bs to see if she is high or low overnight. Add a 2am scale.   -Continue Humalog 5 units SQ AC meals    DISPO: she just missed her appointment with Dr. Ley, her  confirmed that he will call to reschedule.  Discharge doses to be determined.  Paris Colbert MD  Pager: [phone]  Nights and Weekends: [phone]

## 2019-09-23 NOTE — PROGRESS NOTE ADULT - SUBJECTIVE AND OBJECTIVE BOX
Chief Complaint/Follow-up on: T1DM    Subjective: Patient seen at HD. She is sleeping but easily aroused. She was found with sheila crackers on her chest. She asked the HD RN for a snack. Discussed with him that she is a T1 so she should not have that as a snack unless she was getting insulin. She notes that her appetite has been decreased and she continues to cough profusely.     MEDICATIONS  (STANDING):  ALBUTerol/ipratropium for Nebulization 3 milliLiter(s) Nebulizer every 6 hours  aspirin enteric coated 81 milliGRAM(s) Oral daily  atorvastatin 40 milliGRAM(s) Oral at bedtime  buDESOnide    Inhalation Suspension 0.5 milliGRAM(s) Inhalation two times a day  cefepime   IVPB 1000 milliGRAM(s) IV Intermittent every 24 hours  chlorhexidine 2% Cloths 1 Application(s) Topical <User Schedule>  clopidogrel Tablet 75 milliGRAM(s) Oral daily  dextrose 5%. 1000 milliLiter(s) (50 mL/Hr) IV Continuous <Continuous>  dextrose 50% Injectable 12.5 Gram(s) IV Push once  dextrose 50% Injectable 25 Gram(s) IV Push once  dextrose 50% Injectable 25 Gram(s) IV Push once  docusate sodium 100 milliGRAM(s) Oral three times a day  heparin  Injectable 5000 Unit(s) SubCutaneous two times a day  hydrALAZINE 25 milliGRAM(s) Oral three times a day  insulin glargine Injectable (LANTUS) 9 Unit(s) SubCutaneous at bedtime  insulin lispro (HumaLOG) corrective regimen sliding scale   SubCutaneous three times a day before meals  insulin lispro (HumaLOG) corrective regimen sliding scale   SubCutaneous at bedtime  insulin lispro Injectable (HumaLOG) 5 Unit(s) SubCutaneous three times a day before meals  insulin lispro Injectable (HumaLOG) 2 Unit(s) SubCutaneous at bedtime  isosorbide   dinitrate Tablet (ISORDIL) 10 milliGRAM(s) Oral three times a day  metoprolol succinate ER 50 milliGRAM(s) Oral daily  Nephro-raphael 1 Tablet(s) Oral daily  pantoprazole    Tablet 40 milliGRAM(s) Oral before breakfast  sevelamer carbonate 800 milliGRAM(s) Oral three times a day with meals    MEDICATIONS  (PRN):  dextrose 40% Gel 15 Gram(s) Oral once PRN Blood Glucose LESS THAN 70 milliGRAM(s)/deciliter  glucagon  Injectable 1 milliGRAM(s) IntraMuscular once PRN Glucose LESS THAN 70 milligrams/deciliter  oxyCODONE    5 mG/acetaminophen 325 mG 1 Tablet(s) Oral every 6 hours PRN Severe Pain (7 - 10)  senna 2 Tablet(s) Oral at bedtime PRN Constipation      PHYSICAL EXAM:  VITALS: T(C): 36.5 (09-23-19 @ 13:50)  T(F): 97.7 (09-23-19 @ 13:50), Max: 98.5 (09-23-19 @ 11:13)  HR: 987 (09-23-19 @ 13:50) (70 - 987)  BP: 136/65 (09-23-19 @ 13:50) (106/59 - 136/65)  RR:  (16 - 18)  SpO2:  (93% - 99%)  Wt(kg): --  GENERAL: NAD, well-groomed, well-developed  EYES: No proptosis, no injection  RESPIRATORY: +coarse bs of lungs b/l  CARDIOVASCULAR: Regular rate and rhythm; No murmurs  GI: Soft, nontender, non distended, normal bowel sounds    POCT Blood Glucose.: 189 mg/dL (09-23-19 @ 11:40)  POCT Blood Glucose.: 341 mg/dL (09-23-19 @ 08:02)  POCT Blood Glucose.: 236 mg/dL (09-22-19 @ 21:18)  POCT Blood Glucose.: 206 mg/dL (09-22-19 @ 18:17)  POCT Blood Glucose.: 331 mg/dL (09-22-19 @ 11:48)  POCT Blood Glucose.: 378 mg/dL (09-22-19 @ 07:45)  POCT Blood Glucose.: 441 mg/dL (09-22-19 @ 05:58)  POCT Blood Glucose.: 428 mg/dL (09-22-19 @ 05:55)  POCT Blood Glucose.: 349 mg/dL (09-21-19 @ 22:31)  POCT Blood Glucose.: 276 mg/dL (09-21-19 @ 19:17)  POCT Blood Glucose.: 237 mg/dL (09-21-19 @ 16:31)    09-23    138  |  95<L>  |  16  ----------------------------<  283<H>  3.8   |  27  |  4.04<H>    EGFR if : 13<L>  EGFR if non : 11<L>    Ca    9.5      09-23  Mg     2.1     09-23  Phos  4.1     09-23    TPro  7.9  /  Alb  4.4  /  TBili  0.3  /  DBili  x   /  AST  19  /  ALT  19  /  AlkPhos  155<H>  09-21        Hemoglobin A1C, Whole Blood: 8.1 % <H> [4.0 - 5.6] (09-16-19 @ 08:04)

## 2019-09-23 NOTE — PROGRESS NOTE ADULT - SUBJECTIVE AND OBJECTIVE BOX
Patient is a 64y old  Female who presents with a chief complaint of Hypoxemia 2/2 healthcare associated pneumonia (23 Sep 2019 16:45)      SUBJECTIVE / OVERNIGHT EVENTS: No new complaints. + cough  Review of Systems  chest pain no  palpitations no  sob improving   nausea no  headache no    MEDICATIONS  (STANDING):  ALBUTerol/ipratropium for Nebulization 3 milliLiter(s) Nebulizer every 6 hours  aspirin enteric coated 81 milliGRAM(s) Oral daily  atorvastatin 40 milliGRAM(s) Oral at bedtime  buDESOnide    Inhalation Suspension 0.5 milliGRAM(s) Inhalation two times a day  cefepime   IVPB 1000 milliGRAM(s) IV Intermittent every 24 hours  chlorhexidine 2% Cloths 1 Application(s) Topical <User Schedule>  clopidogrel Tablet 75 milliGRAM(s) Oral daily  dextrose 5%. 1000 milliLiter(s) (50 mL/Hr) IV Continuous <Continuous>  dextrose 50% Injectable 12.5 Gram(s) IV Push once  dextrose 50% Injectable 25 Gram(s) IV Push once  dextrose 50% Injectable 25 Gram(s) IV Push once  docusate sodium 100 milliGRAM(s) Oral three times a day  heparin  Injectable 5000 Unit(s) SubCutaneous two times a day  hydrALAZINE 25 milliGRAM(s) Oral three times a day  insulin glargine Injectable (LANTUS) 9 Unit(s) SubCutaneous at bedtime  insulin lispro (HumaLOG) corrective regimen sliding scale   SubCutaneous three times a day before meals  insulin lispro (HumaLOG) corrective regimen sliding scale   SubCutaneous at bedtime  insulin lispro (HumaLOG) corrective regimen sliding scale   SubCutaneous <User Schedule>  insulin lispro Injectable (HumaLOG) 5 Unit(s) SubCutaneous three times a day before meals  insulin lispro Injectable (HumaLOG) 2 Unit(s) SubCutaneous at bedtime  isosorbide   dinitrate Tablet (ISORDIL) 10 milliGRAM(s) Oral three times a day  metoprolol succinate ER 50 milliGRAM(s) Oral daily  Nephro-raphael 1 Tablet(s) Oral daily  pantoprazole    Tablet 40 milliGRAM(s) Oral before breakfast  sevelamer carbonate 800 milliGRAM(s) Oral three times a day with meals    MEDICATIONS  (PRN):  dextrose 40% Gel 15 Gram(s) Oral once PRN Blood Glucose LESS THAN 70 milliGRAM(s)/deciliter  glucagon  Injectable 1 milliGRAM(s) IntraMuscular once PRN Glucose LESS THAN 70 milligrams/deciliter  oxyCODONE    5 mG/acetaminophen 325 mG 1 Tablet(s) Oral every 6 hours PRN Severe Pain (7 - 10)  senna 2 Tablet(s) Oral at bedtime PRN Constipation      Vital Signs Last 24 Hrs  T(C): 36.4 (23 Sep 2019 17:21), Max: 36.9 (23 Sep 2019 11:13)  T(F): 97.6 (23 Sep 2019 17:21), Max: 98.5 (23 Sep 2019 11:13)  HR: 76 (23 Sep 2019 17:21) (76 - 987)  BP: 155/81 (23 Sep 2019 17:21) (106/59 - 155/81)  BP(mean): --  RR: 18 (23 Sep 2019 13:50) (16 - 18)  SpO2: 97% (23 Sep 2019 13:50) (93% - 97%)    PHYSICAL EXAM:  GENERAL: NAD, well-developed  HEAD:  Atraumatic, Normocephalic  EYES: EOMI, PERRLA, conjunctiva and sclera clear  NECK: Supple, No JVD  CHEST/LUNG: Clear to auscultation bilaterally; No wheeze  HEART: Regular rate and rhythm; No murmurs, rubs, or gallops  ABDOMEN: Soft, Nontender, Nondistended; Bowel sounds present  EXTREMITIES:  2+ Peripheral Pulses, No clubbing, cyanosis, or edema  PSYCH: AAOx3  NEUROLOGY: non-focal  SKIN: No rashes or lesions    LABS:                        9.4    7.1   )-----------( 241      ( 23 Sep 2019 05:12 )             30.0     09-23    138  |  95<L>  |  16  ----------------------------<  283<H>  3.8   |  27  |  4.04<H>    Ca    9.5      23 Sep 2019 05:12  Phos  4.1     09-23  Mg     2.1     09-23                Culture - Blood (collected 21 Sep 2019 19:23)  Source: .Blood  Preliminary Report (22 Sep 2019 20:00):    No growth to date.    Culture - Blood (collected 21 Sep 2019 19:18)  Source: .Blood  Preliminary Report (22 Sep 2019 20:00):    No growth to date.        RADIOLOGY & ADDITIONAL TESTS:    Imaging Personally Reviewed:    Consultant(s) Notes Reviewed:      Care Discussed with Consultants/Other Providers:

## 2019-09-23 NOTE — PROGRESS NOTE ADULT - SUBJECTIVE AND OBJECTIVE BOX
CC: f/u for possible pneumonia    Patient reports: she appears comfortable on nasal oxygen, asking to go home.Minimal cough, no purulent sputum.    REVIEW OF SYSTEMS:  All other review of systems negative (Comprehensive ROS)    Antimicrobials Day #  :day 2 vanco and cefepime  cefepime   IVPB 1000 milliGRAM(s) IV Intermittent every 24 hours    Other Medications Reviewed    T(F): 98 (09-23-19 @ 04:17), Max: 98.5 (09-22-19 @ 15:50)  HR: 84 (09-23-19 @ 04:17)  BP: 116/59 (09-23-19 @ 04:17)  RR: 16 (09-23-19 @ 04:17)  SpO2: 95% (09-23-19 @ 04:17)  Wt(kg): --    PHYSICAL EXAM:  General: alert, no acute distress  Eyes:  anicteric, no conjunctival injection, no discharge  Oropharynx: no lesions or injection 	  Neck: supple, without adenopathy  Lungs: coarse BS  Heart: regular rate and rhythm; +heraclio  Abdomen: soft, nondistended, nontender, without mass or organomegaly  Skin: no lesions  Extremities: no clubbing, cyanosis, or edema  Neurologic: alert, oriented, moves all extremities    LAB RESULTS:                        9.4    7.1   )-----------( 241      ( 23 Sep 2019 05:12 )             30.0     09-23    138  |  95<L>  |  16  ----------------------------<  283<H>  3.8   |  27  |  4.04<H>    Ca    9.5      23 Sep 2019 05:12  Phos  4.1     09-23  Mg     2.1     09-23    TPro  7.9  /  Alb  4.4  /  TBili  0.3  /  DBili  x   /  AST  19  /  ALT  19  /  AlkPhos  155<H>  09-21    LIVER FUNCTIONS - ( 21 Sep 2019 16:49 )  Alb: 4.4 g/dL / Pro: 7.9 g/dL / ALK PHOS: 155 U/L / ALT: 19 U/L / AST: 19 U/L / GGT: x             MICROBIOLOGY:  RECENT CULTURES:  09-21 @ 19:23 .Blood     No growth to date.      09-21 @ 19:18 .Blood     No growth to date.          RADIOLOGY REVIEWED:    < from: CT Angio Chest w/ IV Cont (09.21.19 @ 18:27) >  IMPRESSION:     1. No pulmonary embolism.  2. Branching "tree-in-bud" airspace opacities in bilateral lower lobes,   right middle lobe and lingula likely infectious in etiology.  3. Focal groundglass airspace opacities in bilateral upper lobes, not   significant changed dating back to at least a CT of the chest from   10/2/2017, which could represent groundglass nodules.    < end of copied text >

## 2019-09-24 LAB
ANION GAP SERPL CALC-SCNC: 17 MMOL/L — SIGNIFICANT CHANGE UP (ref 5–17)
ANION GAP SERPL CALC-SCNC: 21 MMOL/L — HIGH (ref 5–17)
ANION GAP SERPL CALC-SCNC: 22 MMOL/L — HIGH (ref 5–17)
ANION GAP SERPL CALC-SCNC: 23 MMOL/L — HIGH (ref 5–17)
B-OH-BUTYR SERPL-SCNC: 0 MMOL/L — SIGNIFICANT CHANGE UP
B-OH-BUTYR SERPL-SCNC: 0.1 MMOL/L — SIGNIFICANT CHANGE UP
BASE EXCESS BLDV CALC-SCNC: 3.9 MMOL/L — HIGH (ref -2–2)
BUN SERPL-MCNC: 15 MG/DL — SIGNIFICANT CHANGE UP (ref 7–23)
BUN SERPL-MCNC: 19 MG/DL — SIGNIFICANT CHANGE UP (ref 7–23)
BUN SERPL-MCNC: 25 MG/DL — HIGH (ref 7–23)
BUN SERPL-MCNC: 27 MG/DL — HIGH (ref 7–23)
CA-I SERPL-SCNC: 1.2 MMOL/L — SIGNIFICANT CHANGE UP (ref 1.12–1.3)
CALCIUM SERPL-MCNC: 10.2 MG/DL — SIGNIFICANT CHANGE UP (ref 8.4–10.5)
CALCIUM SERPL-MCNC: 10.5 MG/DL — SIGNIFICANT CHANGE UP (ref 8.4–10.5)
CALCIUM SERPL-MCNC: 10.7 MG/DL — HIGH (ref 8.4–10.5)
CALCIUM SERPL-MCNC: 9.8 MG/DL — SIGNIFICANT CHANGE UP (ref 8.4–10.5)
CHLORIDE BLDV-SCNC: 96 MMOL/L — SIGNIFICANT CHANGE UP (ref 96–108)
CHLORIDE SERPL-SCNC: 84 MMOL/L — LOW (ref 96–108)
CHLORIDE SERPL-SCNC: 87 MMOL/L — LOW (ref 96–108)
CHLORIDE SERPL-SCNC: 88 MMOL/L — LOW (ref 96–108)
CHLORIDE SERPL-SCNC: 90 MMOL/L — LOW (ref 96–108)
CO2 BLDV-SCNC: 30 MMOL/L — SIGNIFICANT CHANGE UP (ref 22–30)
CO2 SERPL-SCNC: 19 MMOL/L — LOW (ref 22–31)
CO2 SERPL-SCNC: 21 MMOL/L — LOW (ref 22–31)
CO2 SERPL-SCNC: 22 MMOL/L — SIGNIFICANT CHANGE UP (ref 22–31)
CO2 SERPL-SCNC: 22 MMOL/L — SIGNIFICANT CHANGE UP (ref 22–31)
CREAT SERPL-MCNC: 3.4 MG/DL — HIGH (ref 0.5–1.3)
CREAT SERPL-MCNC: 3.78 MG/DL — HIGH (ref 0.5–1.3)
CREAT SERPL-MCNC: 4.02 MG/DL — HIGH (ref 0.5–1.3)
CREAT SERPL-MCNC: 4.36 MG/DL — HIGH (ref 0.5–1.3)
GAS PNL BLDA: SIGNIFICANT CHANGE UP
GAS PNL BLDV: 133 MMOL/L — LOW (ref 135–145)
GAS PNL BLDV: SIGNIFICANT CHANGE UP
GLUCOSE BLDC GLUCOMTR-MCNC: 260 MG/DL — HIGH (ref 70–99)
GLUCOSE BLDC GLUCOMTR-MCNC: 354 MG/DL — HIGH (ref 70–99)
GLUCOSE BLDC GLUCOMTR-MCNC: 449 MG/DL — HIGH (ref 70–99)
GLUCOSE BLDC GLUCOMTR-MCNC: 518 MG/DL — CRITICAL HIGH (ref 70–99)
GLUCOSE BLDC GLUCOMTR-MCNC: 561 MG/DL — CRITICAL HIGH (ref 70–99)
GLUCOSE BLDC GLUCOMTR-MCNC: 580 MG/DL — CRITICAL HIGH (ref 70–99)
GLUCOSE BLDC GLUCOMTR-MCNC: 590 MG/DL — CRITICAL HIGH (ref 70–99)
GLUCOSE BLDC GLUCOMTR-MCNC: >600 MG/DL — CRITICAL HIGH (ref 70–99)
GLUCOSE BLDC GLUCOMTR-MCNC: >600 MG/DL — CRITICAL HIGH (ref 70–99)
GLUCOSE BLDV-MCNC: 291 MG/DL — HIGH (ref 70–99)
GLUCOSE SERPL-MCNC: 424 MG/DL — HIGH (ref 70–99)
GLUCOSE SERPL-MCNC: 567 MG/DL — CRITICAL HIGH (ref 70–99)
GLUCOSE SERPL-MCNC: 582 MG/DL — CRITICAL HIGH (ref 70–99)
GLUCOSE SERPL-MCNC: 664 MG/DL — CRITICAL HIGH (ref 70–99)
HCO3 BLDV-SCNC: 29 MMOL/L — SIGNIFICANT CHANGE UP (ref 21–29)
HCT VFR BLD CALC: 35 % — SIGNIFICANT CHANGE UP (ref 34.5–45)
HCT VFR BLDA CALC: 30 % — LOW (ref 39–50)
HGB BLD CALC-MCNC: 9.8 G/DL — LOW (ref 11.5–15.5)
HGB BLD-MCNC: 10.9 G/DL — LOW (ref 11.5–15.5)
HOROWITZ INDEX BLDV+IHG-RTO: SIGNIFICANT CHANGE UP
LACTATE BLDV-MCNC: 1.6 MMOL/L — SIGNIFICANT CHANGE UP (ref 0.7–2)
MCHC RBC-ENTMCNC: 31.1 GM/DL — LOW (ref 32–36)
MCHC RBC-ENTMCNC: 33.3 PG — SIGNIFICANT CHANGE UP (ref 27–34)
MCV RBC AUTO: 107 FL — HIGH (ref 80–100)
OTHER CELLS CSF MANUAL: 10 ML/DL — LOW (ref 18–22)
PCO2 BLDV: 47 MMHG — SIGNIFICANT CHANGE UP (ref 35–50)
PH BLDV: 7.4 — SIGNIFICANT CHANGE UP (ref 7.35–7.45)
PLATELET # BLD AUTO: 203 K/UL — SIGNIFICANT CHANGE UP (ref 150–400)
PO2 BLDV: 40 MMHG — SIGNIFICANT CHANGE UP (ref 25–45)
POTASSIUM BLDV-SCNC: 3.7 MMOL/L — SIGNIFICANT CHANGE UP (ref 3.5–5.3)
POTASSIUM SERPL-MCNC: 3.9 MMOL/L — SIGNIFICANT CHANGE UP (ref 3.5–5.3)
POTASSIUM SERPL-MCNC: 4.6 MMOL/L — SIGNIFICANT CHANGE UP (ref 3.5–5.3)
POTASSIUM SERPL-MCNC: 4.7 MMOL/L — SIGNIFICANT CHANGE UP (ref 3.5–5.3)
POTASSIUM SERPL-MCNC: 4.7 MMOL/L — SIGNIFICANT CHANGE UP (ref 3.5–5.3)
POTASSIUM SERPL-SCNC: 3.9 MMOL/L — SIGNIFICANT CHANGE UP (ref 3.5–5.3)
POTASSIUM SERPL-SCNC: 4.6 MMOL/L — SIGNIFICANT CHANGE UP (ref 3.5–5.3)
POTASSIUM SERPL-SCNC: 4.7 MMOL/L — SIGNIFICANT CHANGE UP (ref 3.5–5.3)
POTASSIUM SERPL-SCNC: 4.7 MMOL/L — SIGNIFICANT CHANGE UP (ref 3.5–5.3)
RBC # BLD: 3.27 M/UL — LOW (ref 3.8–5.2)
RBC # FLD: 14.1 % — SIGNIFICANT CHANGE UP (ref 10.3–14.5)
SAO2 % BLDV: 72 % — SIGNIFICANT CHANGE UP (ref 67–88)
SODIUM SERPL-SCNC: 127 MMOL/L — LOW (ref 135–145)
SODIUM SERPL-SCNC: 128 MMOL/L — LOW (ref 135–145)
SODIUM SERPL-SCNC: 129 MMOL/L — LOW (ref 135–145)
SODIUM SERPL-SCNC: 132 MMOL/L — LOW (ref 135–145)
TROPONIN T, HIGH SENSITIVITY RESULT: 383 NG/L — HIGH (ref 0–51)
WBC # BLD: 7.17 K/UL — SIGNIFICANT CHANGE UP (ref 3.8–10.5)
WBC # FLD AUTO: 7.17 K/UL — SIGNIFICANT CHANGE UP (ref 3.8–10.5)

## 2019-09-24 PROCEDURE — 99233 SBSQ HOSP IP/OBS HIGH 50: CPT

## 2019-09-24 PROCEDURE — 93010 ELECTROCARDIOGRAM REPORT: CPT | Mod: 77

## 2019-09-24 PROCEDURE — 71045 X-RAY EXAM CHEST 1 VIEW: CPT | Mod: 26

## 2019-09-24 PROCEDURE — 99232 SBSQ HOSP IP/OBS MODERATE 35: CPT

## 2019-09-24 PROCEDURE — 93010 ELECTROCARDIOGRAM REPORT: CPT | Mod: 76

## 2019-09-24 PROCEDURE — 99223 1ST HOSP IP/OBS HIGH 75: CPT | Mod: GC

## 2019-09-24 RX ORDER — INSULIN LISPRO 100/ML
10 VIAL (ML) SUBCUTANEOUS ONCE
Refills: 0 | Status: COMPLETED | OUTPATIENT
Start: 2019-09-24 | End: 2019-09-24

## 2019-09-24 RX ORDER — INSULIN LISPRO 100/ML
VIAL (ML) SUBCUTANEOUS EVERY 4 HOURS
Refills: 0 | Status: DISCONTINUED | OUTPATIENT
Start: 2019-09-24 | End: 2019-09-25

## 2019-09-24 RX ORDER — HUMAN INSULIN 100 [IU]/ML
5 INJECTION, SUSPENSION SUBCUTANEOUS
Refills: 0 | Status: DISCONTINUED | OUTPATIENT
Start: 2019-09-24 | End: 2019-09-24

## 2019-09-24 RX ORDER — SODIUM CHLORIDE 9 MG/ML
500 INJECTION INTRAMUSCULAR; INTRAVENOUS; SUBCUTANEOUS
Refills: 0 | Status: DISCONTINUED | OUTPATIENT
Start: 2019-09-24 | End: 2019-09-30

## 2019-09-24 RX ORDER — INSULIN GLARGINE 100 [IU]/ML
9 INJECTION, SOLUTION SUBCUTANEOUS AT BEDTIME
Refills: 0 | Status: DISCONTINUED | OUTPATIENT
Start: 2019-09-24 | End: 2019-09-26

## 2019-09-24 RX ORDER — INSULIN GLARGINE 100 [IU]/ML
4 INJECTION, SOLUTION SUBCUTANEOUS ONCE
Refills: 0 | Status: COMPLETED | OUTPATIENT
Start: 2019-09-24 | End: 2019-09-24

## 2019-09-24 RX ORDER — INSULIN LISPRO 100/ML
10 VIAL (ML) SUBCUTANEOUS ONCE
Refills: 0 | Status: DISCONTINUED | OUTPATIENT
Start: 2019-09-24 | End: 2019-09-24

## 2019-09-24 RX ORDER — INSULIN LISPRO 100/ML
12 VIAL (ML) SUBCUTANEOUS ONCE
Refills: 0 | Status: COMPLETED | OUTPATIENT
Start: 2019-09-24 | End: 2019-09-24

## 2019-09-24 RX ORDER — INSULIN LISPRO 100/ML
VIAL (ML) SUBCUTANEOUS EVERY 6 HOURS
Refills: 0 | Status: DISCONTINUED | OUTPATIENT
Start: 2019-09-24 | End: 2019-09-24

## 2019-09-24 RX ORDER — SODIUM CHLORIDE 9 MG/ML
500 INJECTION INTRAMUSCULAR; INTRAVENOUS; SUBCUTANEOUS
Refills: 0 | Status: DISCONTINUED | OUTPATIENT
Start: 2019-09-24 | End: 2019-09-24

## 2019-09-24 RX ORDER — INSULIN GLARGINE 100 [IU]/ML
11 INJECTION, SOLUTION SUBCUTANEOUS AT BEDTIME
Refills: 0 | Status: DISCONTINUED | OUTPATIENT
Start: 2019-09-24 | End: 2019-09-24

## 2019-09-24 RX ORDER — HUMAN INSULIN 100 [IU]/ML
5 INJECTION, SUSPENSION SUBCUTANEOUS
Refills: 0 | Status: DISCONTINUED | OUTPATIENT
Start: 2019-09-25 | End: 2019-09-27

## 2019-09-24 RX ORDER — CEFEPIME 1 G/1
1000 INJECTION, POWDER, FOR SOLUTION INTRAMUSCULAR; INTRAVENOUS EVERY 8 HOURS
Refills: 0 | Status: DISCONTINUED | OUTPATIENT
Start: 2019-09-24 | End: 2019-09-25

## 2019-09-24 RX ORDER — ACETAMINOPHEN 500 MG
325 TABLET ORAL ONCE
Refills: 0 | Status: COMPLETED | OUTPATIENT
Start: 2019-09-24 | End: 2019-09-24

## 2019-09-24 RX ORDER — INSULIN LISPRO 100/ML
4 VIAL (ML) SUBCUTANEOUS ONCE
Refills: 0 | Status: COMPLETED | OUTPATIENT
Start: 2019-09-24 | End: 2019-09-24

## 2019-09-24 RX ADMIN — Medication 5 UNIT(S): at 07:59

## 2019-09-24 RX ADMIN — SEVELAMER CARBONATE 800 MILLIGRAM(S): 2400 POWDER, FOR SUSPENSION ORAL at 08:00

## 2019-09-24 RX ADMIN — OXYCODONE AND ACETAMINOPHEN 1 TABLET(S): 5; 325 TABLET ORAL at 01:37

## 2019-09-24 RX ADMIN — OXYCODONE AND ACETAMINOPHEN 1 TABLET(S): 5; 325 TABLET ORAL at 23:48

## 2019-09-24 RX ADMIN — OXYCODONE AND ACETAMINOPHEN 1 TABLET(S): 5; 325 TABLET ORAL at 08:04

## 2019-09-24 RX ADMIN — Medication 500 MICROGRAM(S): at 00:09

## 2019-09-24 RX ADMIN — Medication 12 UNIT(S): at 13:00

## 2019-09-24 RX ADMIN — Medication 325 MILLIGRAM(S): at 03:00

## 2019-09-24 RX ADMIN — CEFEPIME 100 MILLIGRAM(S): 1 INJECTION, POWDER, FOR SOLUTION INTRAMUSCULAR; INTRAVENOUS at 23:19

## 2019-09-24 RX ADMIN — Medication 325 MILLIGRAM(S): at 08:04

## 2019-09-24 RX ADMIN — PANTOPRAZOLE SODIUM 40 MILLIGRAM(S): 20 TABLET, DELAYED RELEASE ORAL at 06:51

## 2019-09-24 RX ADMIN — INSULIN GLARGINE 9 UNIT(S): 100 INJECTION, SOLUTION SUBCUTANEOUS at 22:55

## 2019-09-24 RX ADMIN — Medication 6: at 07:59

## 2019-09-24 RX ADMIN — Medication 81 MILLIGRAM(S): at 11:18

## 2019-09-24 RX ADMIN — Medication 3 MILLILITER(S): at 02:09

## 2019-09-24 RX ADMIN — OXYCODONE AND ACETAMINOPHEN 1 TABLET(S): 5; 325 TABLET ORAL at 00:29

## 2019-09-24 RX ADMIN — Medication 3: at 02:06

## 2019-09-24 RX ADMIN — SODIUM CHLORIDE 40 MILLILITER(S): 9 INJECTION INTRAMUSCULAR; INTRAVENOUS; SUBCUTANEOUS at 12:53

## 2019-09-24 RX ADMIN — SEVELAMER CARBONATE 800 MILLIGRAM(S): 2400 POWDER, FOR SUSPENSION ORAL at 11:17

## 2019-09-24 RX ADMIN — Medication 1 TABLET(S): at 11:18

## 2019-09-24 RX ADMIN — SODIUM CHLORIDE 40 MILLILITER(S): 9 INJECTION INTRAMUSCULAR; INTRAVENOUS; SUBCUTANEOUS at 18:16

## 2019-09-24 RX ADMIN — ATORVASTATIN CALCIUM 40 MILLIGRAM(S): 80 TABLET, FILM COATED ORAL at 23:20

## 2019-09-24 RX ADMIN — OXYCODONE AND ACETAMINOPHEN 1 TABLET(S): 5; 325 TABLET ORAL at 12:59

## 2019-09-24 RX ADMIN — ISOSORBIDE DINITRATE 10 MILLIGRAM(S): 5 TABLET ORAL at 13:33

## 2019-09-24 RX ADMIN — Medication 4 UNIT(S): at 08:28

## 2019-09-24 RX ADMIN — CLOPIDOGREL BISULFATE 75 MILLIGRAM(S): 75 TABLET, FILM COATED ORAL at 11:17

## 2019-09-24 RX ADMIN — CHLORHEXIDINE GLUCONATE 1 APPLICATION(S): 213 SOLUTION TOPICAL at 08:02

## 2019-09-24 RX ADMIN — Medication 6: at 22:55

## 2019-09-24 RX ADMIN — Medication 3 MILLILITER(S): at 16:48

## 2019-09-24 RX ADMIN — OXYCODONE AND ACETAMINOPHEN 1 TABLET(S): 5; 325 TABLET ORAL at 23:18

## 2019-09-24 RX ADMIN — Medication 10 UNIT(S): at 18:14

## 2019-09-24 RX ADMIN — OXYCODONE AND ACETAMINOPHEN 1 TABLET(S): 5; 325 TABLET ORAL at 06:49

## 2019-09-24 RX ADMIN — OXYCODONE AND ACETAMINOPHEN 1 TABLET(S): 5; 325 TABLET ORAL at 14:16

## 2019-09-24 RX ADMIN — Medication 25 MILLIGRAM(S): at 06:51

## 2019-09-24 RX ADMIN — Medication 0.5 MILLIGRAM(S): at 08:00

## 2019-09-24 RX ADMIN — Medication 10 UNIT(S): at 20:14

## 2019-09-24 RX ADMIN — Medication 0.5 MILLIGRAM(S): at 16:48

## 2019-09-24 RX ADMIN — ISOSORBIDE DINITRATE 10 MILLIGRAM(S): 5 TABLET ORAL at 06:51

## 2019-09-24 RX ADMIN — INSULIN GLARGINE 4 UNIT(S): 100 INJECTION, SOLUTION SUBCUTANEOUS at 14:16

## 2019-09-24 RX ADMIN — ISOSORBIDE DINITRATE 10 MILLIGRAM(S): 5 TABLET ORAL at 23:20

## 2019-09-24 RX ADMIN — Medication 3 MILLILITER(S): at 06:52

## 2019-09-24 RX ADMIN — Medication 50 MILLIGRAM(S): at 06:51

## 2019-09-24 RX ADMIN — Medication 3 MILLILITER(S): at 11:17

## 2019-09-24 RX ADMIN — Medication 40 MILLIGRAM(S): at 06:49

## 2019-09-24 NOTE — PROGRESS NOTE ADULT - ASSESSMENT
· Assessment		  65 yo F with ESRD (on HD M/Tu/Th/Sa), type 1 DM w/DKA in past, CAD, HFrEF (EF 50% on TTE from 10/18), TIA, and PVD, very recent admission here until yesterday for COPD exacerbation felt 2/2 viral illness from enterovirus now presents with worsening dyspnea and hypoxemia down to 80s (O2 sat 82% on intial ED evaluation per ED provider note).       Pneumonia of left lower lobe due to infectious organism.   - Concern for HCAP given multiple recent hospitalizations in setting of known viral URI.  Vancomycin by level  Zosyn 3.375g q12h  O2 supplemental PRN.  ID follow  Pulmonary follow     Chronic obstructive pulmonary disease, unspecified COPD type.   Duonebs q6h ATC  budesonide nebulizers q12h    EKG abnormalities.   Findings of lateral TWI on EKG. May be related to hypoxemia exacerbating underlying CAD. Pt with chronically elevated troponin T.  Cardiology follow Dr. Biswas    Type 1 diabetes mellitus with chronic kidney disease on chronic dialysis.   Lantus 9 units qhs  Humalog 3 units TID AC  ISS.   Endocrine follow noted    Chronic congestive heart failure, unspecified heart failure type.  Appears euvolemic at this time  Volume management via HD while inpatient, did receive HD already today  renal consult for HD.     Coronary artery disease involving native coronary artery of native heart without angina pectoris.   atorvastatin  Aspirin  Plavix    ESRD  HD  Nephrology follow Dr. Brayan Viera MD pager [phone]

## 2019-09-24 NOTE — PROGRESS NOTE ADULT - ASSESSMENT
65y/o M w/h/o uncontrolled TIDM (HbA1c 8.1% 9/19) c/b neuropathy and retinopathy as well as CAD s/p multiple stents, CHF (EF 50% 10/2018), TIA, PVD s/p b/l fem-pop bypass, ESRD> HD. Recent admission with COPD exacerbation felt 2/2 viral illness from enterovirus now presents with worsening dyspnea and hypoxemia. Now treated for PNA > on antibiotic > started steroid therapy this am with rebound glucotoxicity. No s/sx of DKA but at risk for it in the setting of sever hyperglycemia. Pt will need higher insulin doses while on steroids. However need to correct metabolic disarray> spoke to team NP about the following plan of care.

## 2019-09-24 NOTE — CHART NOTE - NSCHARTNOTEFT_GEN_A_CORE
NP note - hyperglycemia    called by RN for hyperglycemia with high f/s result before breakfast and lunch. f/u > 500. pt had received premeal and sliding scale coverage, plus 5 extra humalog before breakfast and 10untis before lunch. called Endo SARAH sol to get recommendations.    Np. T[place] Pardo  [phone]

## 2019-09-24 NOTE — PROGRESS NOTE ADULT - SUBJECTIVE AND OBJECTIVE BOX
Patient is a 64y old  Female who presents with a chief complaint of Hypoxemia 2/2 healthcare associated pneumonia (24 Sep 2019 13:26)      SUBJECTIVE / OVERNIGHT EVENTS: feels better.  Review of Systems  chest pain no  palpitations no  sob no  nausea no  headache no    MEDICATIONS  (STANDING):  ALBUTerol/ipratropium for Nebulization 3 milliLiter(s) Nebulizer every 6 hours  aspirin enteric coated 81 milliGRAM(s) Oral daily  atorvastatin 40 milliGRAM(s) Oral at bedtime  buDESOnide    Inhalation Suspension 0.5 milliGRAM(s) Inhalation two times a day  cefepime   IVPB 1000 milliGRAM(s) IV Intermittent every 8 hours  chlorhexidine 2% Cloths 1 Application(s) Topical <User Schedule>  clopidogrel Tablet 75 milliGRAM(s) Oral daily  dextrose 5%. 1000 milliLiter(s) (50 mL/Hr) IV Continuous <Continuous>  dextrose 50% Injectable 12.5 Gram(s) IV Push once  dextrose 50% Injectable 25 Gram(s) IV Push once  dextrose 50% Injectable 25 Gram(s) IV Push once  docusate sodium 100 milliGRAM(s) Oral three times a day  heparin  Injectable 5000 Unit(s) SubCutaneous two times a day  hydrALAZINE 25 milliGRAM(s) Oral three times a day  insulin glargine Injectable (LANTUS) 11 Unit(s) SubCutaneous at bedtime  insulin lispro (HumaLOG) corrective regimen sliding scale   SubCutaneous <User Schedule>  insulin lispro Injectable (HumaLOG) 2 Unit(s) SubCutaneous at bedtime  insulin lispro Injectable (HumaLOG) 10 Unit(s) SubCutaneous once  isosorbide   dinitrate Tablet (ISORDIL) 10 milliGRAM(s) Oral three times a day  metoprolol succinate ER 50 milliGRAM(s) Oral daily  Nephro-raphael 1 Tablet(s) Oral daily  pantoprazole    Tablet 40 milliGRAM(s) Oral before breakfast  predniSONE   Tablet 40 milliGRAM(s) Oral daily  sevelamer carbonate 800 milliGRAM(s) Oral three times a day with meals  sodium chloride 0.9%. 500 milliLiter(s) (40 mL/Hr) IV Continuous <Continuous>    MEDICATIONS  (PRN):  dextrose 40% Gel 15 Gram(s) Oral once PRN Blood Glucose LESS THAN 70 milliGRAM(s)/deciliter  glucagon  Injectable 1 milliGRAM(s) IntraMuscular once PRN Glucose LESS THAN 70 milligrams/deciliter  oxyCODONE    5 mG/acetaminophen 325 mG 1 Tablet(s) Oral every 6 hours PRN Severe Pain (7 - 10)  senna 2 Tablet(s) Oral at bedtime PRN Constipation      Vital Signs Last 24 Hrs  T(C): 36.8 (24 Sep 2019 11:17), Max: 36.8 (24 Sep 2019 11:17)  T(F): 98.2 (24 Sep 2019 11:17), Max: 98.2 (24 Sep 2019 11:17)  HR: 89 (24 Sep 2019 13:31) (84 - 114)  BP: 117/63 (24 Sep 2019 13:31) (117/63 - 137/87)  BP(mean): --  RR: 18 (24 Sep 2019 11:17) (18 - 18)  SpO2: 95% (24 Sep 2019 11:17) (95% - 95%)    PHYSICAL EXAM:  GENERAL: NAD  HEAD:  Atraumatic, Normocephalic  EYES: EOMI, PERRLA, conjunctiva and sclera clear  NECK: Supple, No JVD  CHEST/LUNG: few crackles and rhonchi to auscultation bilaterally; No wheeze  HEART: Regular rate and rhythm; No murmurs, rubs, or gallops  ABDOMEN: Soft, Nontender, Nondistended; Bowel sounds present  EXTREMITIES:  1+ edema ble  PSYCH: AAOx3  NEUROLOGY: non-focal  SKIN: No rashes or lesions    LABS:                        10.9   7.17  )-----------( 203      ( 24 Sep 2019 09:30 )             35.0     09-24    127<L>  |  87<L>  |  25<H>  ----------------------------<  567<HH>  4.7   |  19<L>  |  4.02<H>    Ca    10.2      24 Sep 2019 16:33  Phos  4.1     09-23  Mg     2.1     09-23                Culture - Blood (collected 21 Sep 2019 19:23)  Source: .Blood  Preliminary Report (22 Sep 2019 20:00):    No growth to date.    Culture - Blood (collected 21 Sep 2019 19:18)  Source: .Blood  Preliminary Report (22 Sep 2019 20:00):    No growth to date.        RADIOLOGY & ADDITIONAL TESTS:    Imaging Personally Reviewed:    Consultant(s) Notes Reviewed:      Care Discussed with Consultants/Other Providers:

## 2019-09-24 NOTE — PROGRESS NOTE ADULT - ASSESSMENT
63 y/o F w/ PMHx ESRD (HD M/T/T/Sat), IDDM, CHF, CAD, ischemic cardiomyopathy presents to the ED BIBA for fever. pt also in DKA, hyperglycemia and now with hyperkalemia as well as DKA    1- esrd  2- hyperkalemia resolved   3- DKA hx   4- HTN   5- chf      hd  am  epogen 16974 U ivp  tiw  hydralazine 25 mg tid   renvela one tab with meals for shpt   given esrd change cefepime dose to 1 g iv qd  hyperglycemia control with insulin

## 2019-09-24 NOTE — PROGRESS NOTE ADULT - SUBJECTIVE AND OBJECTIVE BOX
[facility]   [phone]  RENAL FOLLOW UP NOTE  --------------------------------------------------------------------------------  Chief Complaint:    24 hour events/subjective:    seen earlier. states is coughing but states is feeling better.     PAST HISTORY  --------------------------------------------------------------------------------  No significant changes to PMH, PSH, FHx, SHx, unless otherwise noted    ALLERGIES & MEDICATIONS  --------------------------------------------------------------------------------  Allergies    No Known Allergies    Intolerances      Standing Inpatient Medications  ALBUTerol/ipratropium for Nebulization 3 milliLiter(s) Nebulizer every 6 hours  aspirin enteric coated 81 milliGRAM(s) Oral daily  atorvastatin 40 milliGRAM(s) Oral at bedtime  buDESOnide    Inhalation Suspension 0.5 milliGRAM(s) Inhalation two times a day  cefepime   IVPB 1000 milliGRAM(s) IV Intermittent every 8 hours  chlorhexidine 2% Cloths 1 Application(s) Topical <User Schedule>  clopidogrel Tablet 75 milliGRAM(s) Oral daily  dextrose 5%. 1000 milliLiter(s) IV Continuous <Continuous>  dextrose 50% Injectable 12.5 Gram(s) IV Push once  dextrose 50% Injectable 25 Gram(s) IV Push once  dextrose 50% Injectable 25 Gram(s) IV Push once  docusate sodium 100 milliGRAM(s) Oral three times a day  heparin  Injectable 5000 Unit(s) SubCutaneous two times a day  hydrALAZINE 25 milliGRAM(s) Oral three times a day  insulin glargine Injectable (LANTUS) 9 Unit(s) SubCutaneous at bedtime  insulin lispro (HumaLOG) corrective regimen sliding scale   SubCutaneous every 4 hours  isosorbide   dinitrate Tablet (ISORDIL) 10 milliGRAM(s) Oral three times a day  metoprolol succinate ER 50 milliGRAM(s) Oral daily  Nephro-raphael 1 Tablet(s) Oral daily  pantoprazole    Tablet 40 milliGRAM(s) Oral before breakfast  predniSONE   Tablet 40 milliGRAM(s) Oral daily  sevelamer carbonate 800 milliGRAM(s) Oral three times a day with meals  sodium chloride 0.9%. 500 milliLiter(s) IV Continuous <Continuous>    PRN Inpatient Medications  dextrose 40% Gel 15 Gram(s) Oral once PRN  glucagon  Injectable 1 milliGRAM(s) IntraMuscular once PRN  oxyCODONE    5 mG/acetaminophen 325 mG 1 Tablet(s) Oral every 6 hours PRN  senna 2 Tablet(s) Oral at bedtime PRN      REVIEW OF SYSTEMS  --------------------------------------------------------------------------------    Gen: denies  fevers/chills,  CVS: denies chest pain/palpitations  Resp: denies SOB/Cough +   GI: Denies N/V/Abd pain  : Denies dysuria    All other systems were reviewed and are negative, except as noted.    VITALS/PHYSICAL EXAM  --------------------------------------------------------------------------------  T(C): 36.4 (09-24-19 @ 20:10), Max: 36.8 (09-24-19 @ 11:17)  HR: 86 (09-24-19 @ 20:10) (84 - 114)  BP: 152/74 (09-24-19 @ 20:10) (117/63 - 152/74)  RR: 18 (09-24-19 @ 20:10) (18 - 18)  SpO2: 100% (09-24-19 @ 20:10) (95% - 100%)  Wt(kg): --        09-23-19 @ 07:01  -  09-24-19 @ 07:00  --------------------------------------------------------  IN: 600 mL / OUT: 2701 mL / NET: -2101 mL    09-24-19 @ 07:01  -  09-24-19 @ 21:05  --------------------------------------------------------  IN: 640 mL / OUT: 0 mL / NET: 640 mL      Physical Exam:  	  Gen: chronically  ill appearing   	+ JVD  	Pulm: decrease bs coarse bs no  wheezing   	CV: RRR, S1S2; no rub  	Abd: +BS, soft, nontender/nondistended  	: No suprapubic tenderness  	UE: Warm, no cyanosis  no clubbing,  no edema  	LE: Warm, no cyanosis  no clubbing, 2+ pitting LLE > RLE  edema 1+   	Neuro: alert and oriented   	Vascular access: + avf + bruit and thrill 	        LABS/STUDIES  --------------------------------------------------------------------------------              10.9   7.17  >-----------<  203      [09-24-19 @ 09:30]              35.0     127  |  87  |  25  ----------------------------<  567      [09-24-19 @ 16:33]  4.7   |  19  |  4.02        Ca     10.2     [09-24-19 @ 16:33]      Mg     2.1     [09-23-19 @ 05:12]      Phos  4.1     [09-23-19 @ 05:12]            Creatinine Trend:  SCr 4.02 [09-24 @ 16:33]  SCr 3.78 [09-24 @ 12:36]  SCr 3.40 [09-24 @ 06:43]  SCr 4.04 [09-23 @ 05:12]  SCr 3.88 [09-22 @ 06:20]                  Iron 42, TIBC 253, %sat 17      [06-17-19 @ 17:54]  Ferritin 1838      [06-17-19 @ 18:06]  PTH -- (Ca 9.6)      [06-17-19 @ 18:06]   177  PTH -- (Ca 7.8)      [03-29-19 @ 20:38]   73  PTH -- (Ca 7.3)      [02-22-19 @ 02:49]   59  HbA1c 8.1      [09-16-19 @ 08:04]  TSH 0.71      [11-17-18 @ 08:25]

## 2019-09-24 NOTE — CONSULT NOTE ADULT - SUBJECTIVE AND OBJECTIVE BOX
CHIEF COMPLAINT: SOB    HPI:  HPI:  Patient is a 63 yo F with ESRD (on HD M/Tu/Th/Sa), type 1 DM w/DKA in past, CAD, HFrEF (EF 50% on TTE from 10/18), TIA, and PVD, very recent admission here until yesterday for COPD exacerbation felt 2/2 viral illness from enterovirus now presents with worsening dyspnea and hypoxemia down to 80s (O2 sat 82% on intial ED evaluation per ED provider note). Patient reports that she had been discharged home yesterday and today went for her usual dialysis. However at dialysis felt short of breath and required O2 via NC throughout dialysis session. After dialysis session patient took a car home but did not have O2 supplementation and by the time she reached home she reported that she was more dyspneic and felt very weak-so weak that she could not get out of the car and her son had to help her get out. +chills. Patient reports that chills have been ongoing for a week as has a cough productive of yellowish to green sputum. Worsened weakness and worsened sats are new. No chest pain, no palpitations. No diarrhea. No nausea/vomiting. No focal neurologic symptoms. No skin rash. No headache. +runny nose x 1 week. No sore throat. 	    No family hx relevant to current admission for health associated pna (21 Sep 2019 23:33)      PAST MEDICAL & SURGICAL HISTORY:  COPD (chronic obstructive pulmonary disease)  Localized enlarged lymph nodes  CHF (congestive heart failure): EF 40-45%  Subclavian vein stenosis, left: s/p stent  DKA, type 1: 1/2015  ACS (acute coronary syndrome): 1/2015 - cath revealed 100% ostial stenosis not amenable to PCI - medical management  TIA (transient ischemic attack): x 2 - 8-9 years ago prior to ASD/VSD repair  CAD (coronary artery disease): s/p stents  Gout: past  CVA (cerebral infarction): with no residual, 8 yrs ago, prior to heart surgery - ST memory loss  Peripheral vascular disease: occluded left fem-pop bypass 5/2015  Diabetes mellitus type 1: Insulin Dependent -  ESRD (end stage renal disease): dialysis  M, tue, thursday, saturday  Hyperlipidemia  Status post device closure of ASD: &quot;clamshell&quot;  History of cardiac catheterization: 1/2015 - no intervention  S/P femoral-popliteal bypass surgery: L and R in 2013 with graft; 5/2015 CFA angioplasty left and ileofemoral endarterectomywith vein patch angioplasty of left fem-pop bypass graft  Multiple vascular surgery both leg, left fempop bypass revision 11/2015  AV (arteriovenous fistula): Left AV graft; revision with stent placement 2-3 years ago  S/P cholecystectomy      FAMILY HISTORY:  Family history of smoking  Family history of hypertension  Family history of cancer (Sibling)      SOCIAL HISTORY:  Smoking: __ packs x ___ years  EtOH Use:  Marital Status:  Occupation:  Recent Travel:  Country of Birth:  Advance Directives:    Allergies    No Known Allergies    Intolerances        HOME MEDICATIONS:    REVIEW OF SYSTEMS:    CONSTITUTIONAL: No weakness, fevers or chills  EYES/ENT: No visual changes;  No vertigo or throat pain   NECK: No pain or stiffness  RESPIRATORY: No cough, wheezing, hemoptysis; No shortness of breath  CARDIOVASCULAR: No chest pain or palpitations  GASTROINTESTINAL: No abdominal or epigastric pain. No nausea, vomiting, or hematemesis; No diarrhea or constipation. No melena or hematochezia.  GENITOURINARY: No dysuria, frequency or hematuria  NEUROLOGICAL: No numbness or weakness  SKIN: No itching, burning, rashes, or lesions   All other review of systems is negative unless indicated above.    OBJECTIVE:  ICU Vital Signs Last 24 Hrs  T(C): 36.4 (24 Sep 2019 20:10), Max: 36.8 (24 Sep 2019 11:17)  T(F): 97.5 (24 Sep 2019 20:10), Max: 98.2 (24 Sep 2019 11:17)  HR: 86 (24 Sep 2019 20:10) (84 - 114)  BP: 152/74 (24 Sep 2019 20:10) (117/63 - 152/74)  BP(mean): --  ABP: --  ABP(mean): --  RR: 18 (24 Sep 2019 20:10) (18 - 18)  SpO2: 100% (24 Sep 2019 20:10) (95% - 100%)        09-23 @ 07:01  -  09-24 @ 07:00  --------------------------------------------------------  IN: 600 mL / OUT: 2701 mL / NET: -2101 mL    09-24 @ 07:01 - 09-24 @ 21:45  --------------------------------------------------------  IN: 640 mL / OUT: 0 mL / NET: 640 mL      CAPILLARY BLOOD GLUCOSE      POCT Blood Glucose.: 449 mg/dL (24 Sep 2019 20:08)      PHYSICAL EXAM:  GENERAL: NAD, well-developed  HEAD:  Atraumatic, Normocephalic  EYES: EOMI, PERRLA, conjunctiva and sclera clear  NECK: Supple, No JVD  CHEST/LUNG: moderate b/l wheezing, no rhonchi   HEART: Regular rate and rhythm; No murmurs, rubs, or gallops  ABDOMEN: Soft, Nontender, Nondistended; Bowel sounds present  EXTREMITIES:  2+ Peripheral Pulses, No clubbing, cyanosis, or edema  PSYCH: AAOx3  NEUROLOGY: non-focal  SKIN: No rashes or lesions    HOSPITAL MEDICATIONS:  MEDICATIONS  (STANDING):  ALBUTerol/ipratropium for Nebulization 3 milliLiter(s) Nebulizer every 6 hours  aspirin enteric coated 81 milliGRAM(s) Oral daily  atorvastatin 40 milliGRAM(s) Oral at bedtime  buDESOnide    Inhalation Suspension 0.5 milliGRAM(s) Inhalation two times a day  cefepime   IVPB 1000 milliGRAM(s) IV Intermittent every 8 hours  chlorhexidine 2% Cloths 1 Application(s) Topical <User Schedule>  clopidogrel Tablet 75 milliGRAM(s) Oral daily  dextrose 5%. 1000 milliLiter(s) (50 mL/Hr) IV Continuous <Continuous>  dextrose 50% Injectable 12.5 Gram(s) IV Push once  dextrose 50% Injectable 25 Gram(s) IV Push once  dextrose 50% Injectable 25 Gram(s) IV Push once  docusate sodium 100 milliGRAM(s) Oral three times a day  heparin  Injectable 5000 Unit(s) SubCutaneous two times a day  hydrALAZINE 25 milliGRAM(s) Oral three times a day  insulin glargine Injectable (LANTUS) 9 Unit(s) SubCutaneous at bedtime  insulin lispro (HumaLOG) corrective regimen sliding scale   SubCutaneous every 4 hours  isosorbide   dinitrate Tablet (ISORDIL) 10 milliGRAM(s) Oral three times a day  metoprolol succinate ER 50 milliGRAM(s) Oral daily  Nephro-raphael 1 Tablet(s) Oral daily  pantoprazole    Tablet 40 milliGRAM(s) Oral before breakfast  predniSONE   Tablet 40 milliGRAM(s) Oral daily  sevelamer carbonate 800 milliGRAM(s) Oral three times a day with meals  sodium chloride 0.9%. 500 milliLiter(s) (40 mL/Hr) IV Continuous <Continuous>    MEDICATIONS  (PRN):  dextrose 40% Gel 15 Gram(s) Oral once PRN Blood Glucose LESS THAN 70 milliGRAM(s)/deciliter  glucagon  Injectable 1 milliGRAM(s) IntraMuscular once PRN Glucose LESS THAN 70 milligrams/deciliter  oxyCODONE    5 mG/acetaminophen 325 mG 1 Tablet(s) Oral every 6 hours PRN Severe Pain (7 - 10)  senna 2 Tablet(s) Oral at bedtime PRN Constipation      LABS:                        10.9   7.17  )-----------( 203      ( 24 Sep 2019 09:30 )             35.0     09-24    132<L>  |  88<L>  |  27<H>  ----------------------------<  424<H>  3.9   |  22  |  4.36<H>    Ca    10.5      24 Sep 2019 20:59  Phos  4.1     09-23  Mg     2.1     09-23          Arterial Blood Gas:  09-24 @ 16:29  7.45/34/67/23/94/-.1  ABG lactate: --    Venous Blood Gas:  09-24 @ 20:54  7.24/63/24/26/26  VBG Lactate: 5.4  Venous Blood Gas:  09-24 @ 00:50  7.40/47/40/29/72  VBG Lactate: 1.6      MICROBIOLOGY:     RADIOLOGY:  [ ] Reviewed and interpreted by me    EKG:

## 2019-09-24 NOTE — PROGRESS NOTE ADULT - SUBJECTIVE AND OBJECTIVE BOX
PULMONARY PROGRESS NOTE    NATHAN MEEKS  MRN-[id number]    Patient is a 64y old  Female who presents with a chief complaint of Hypoxemia 2/2 healthcare associated pneumonia (23 Sep 2019 22:13)      HPI:  -  -    ROS:   -    ACTIVE MEDICATION LIST:  MEDICATIONS  (STANDING):  ALBUTerol/ipratropium for Nebulization 3 milliLiter(s) Nebulizer every 6 hours  aspirin enteric coated 81 milliGRAM(s) Oral daily  atorvastatin 40 milliGRAM(s) Oral at bedtime  buDESOnide    Inhalation Suspension 0.5 milliGRAM(s) Inhalation two times a day  cefepime   IVPB 1000 milliGRAM(s) IV Intermittent every 24 hours  chlorhexidine 2% Cloths 1 Application(s) Topical <User Schedule>  clopidogrel Tablet 75 milliGRAM(s) Oral daily  dextrose 5%. 1000 milliLiter(s) (50 mL/Hr) IV Continuous <Continuous>  dextrose 50% Injectable 12.5 Gram(s) IV Push once  dextrose 50% Injectable 25 Gram(s) IV Push once  dextrose 50% Injectable 25 Gram(s) IV Push once  docusate sodium 100 milliGRAM(s) Oral three times a day  heparin  Injectable 5000 Unit(s) SubCutaneous two times a day  hydrALAZINE 25 milliGRAM(s) Oral three times a day  insulin glargine Injectable (LANTUS) 9 Unit(s) SubCutaneous at bedtime  insulin lispro (HumaLOG) corrective regimen sliding scale   SubCutaneous three times a day before meals  insulin lispro (HumaLOG) corrective regimen sliding scale   SubCutaneous at bedtime  insulin lispro (HumaLOG) corrective regimen sliding scale   SubCutaneous <User Schedule>  insulin lispro Injectable (HumaLOG) 5 Unit(s) SubCutaneous three times a day before meals  insulin lispro Injectable (HumaLOG) 2 Unit(s) SubCutaneous at bedtime  isosorbide   dinitrate Tablet (ISORDIL) 10 milliGRAM(s) Oral three times a day  metoprolol succinate ER 50 milliGRAM(s) Oral daily  Nephro-raphael 1 Tablet(s) Oral daily  pantoprazole    Tablet 40 milliGRAM(s) Oral before breakfast  predniSONE   Tablet 40 milliGRAM(s) Oral daily  sevelamer carbonate 800 milliGRAM(s) Oral three times a day with meals    MEDICATIONS  (PRN):  dextrose 40% Gel 15 Gram(s) Oral once PRN Blood Glucose LESS THAN 70 milliGRAM(s)/deciliter  glucagon  Injectable 1 milliGRAM(s) IntraMuscular once PRN Glucose LESS THAN 70 milligrams/deciliter  oxyCODONE    5 mG/acetaminophen 325 mG 1 Tablet(s) Oral every 6 hours PRN Severe Pain (7 - 10)  senna 2 Tablet(s) Oral at bedtime PRN Constipation      EXAM:  Vital Signs Last 24 Hrs  T(C): 36.7 (24 Sep 2019 04:59), Max: 36.9 (23 Sep 2019 11:13)  T(F): 98 (24 Sep 2019 04:59), Max: 98.5 (23 Sep 2019 11:13)  HR: 114 (24 Sep 2019 06:47) (76 - 987)  BP: 137/87 (24 Sep 2019 06:47) (109/75 - 155/81)  BP(mean): --  RR: 18 (24 Sep 2019 04:59) (18 - 18)  SpO2: 95% (24 Sep 2019 04:59) (95% - 97%)    GENERAL: The patient is awake and alert in no apparent distress.     LUNGS: Clear to auscultation without wheezing, rales or rhonchi; respirations unlabored    HEART: Regular rate and rhythm without murmur.                            9.4    7.1   )-----------( 241      ( 23 Sep 2019 05:12 )             30.0       09-24    129<L>  |  90<L>  |  15  ----------------------------<  582<HH>  4.7   |  22  |  3.40<H>    Ca    9.8      24 Sep 2019 06:43  Phos  4.1     09-23  Mg     2.1     09-23      < from: CT Angio Chest w/ IV Cont (09.21.19 @ 18:27) >  EXAM:  CT ANGIO CHEST (W)AW IC                            PROCEDURE DATE:  09/21/2019            INTERPRETATION:  CLINICAL INFORMATION: Chest pain. Evaluate for pulmonary   embolism    COMPARISON: CT chest 4/18/2019, 10/2/2017.    PROCEDURE:   CT Angiography of the Chest.  68 ml of Omnipaque 350 was injected intravenously. 32 ml were discarded.  Sagittal and coronal reformats were performed as well as 3D (MIP)   reconstructions.    FINDINGS:    LUNGS AND AIRWAYS: Patent central airways. Branching "tree-in-bud"   airspace opacities in bilateral lower lobes, right middle lobe and   lingula. 1.3 cm right upper lobe focal groundglass airspace opacity   (series 3, image 109) is not significantly changed. 0.8 cm focal   groundglass versus opacityin the right upper lobe (series 3, image 166)   is not significantly changed. 1.2 cm focal groundglass airspace opacity   in the left upper lobe (series 3, image 92) is not significantly changed.   Bilateral lower lobe subsegmental atelectasis. Biapical scarring. Right   apical blebs. Centrilobular emphysema most prominent in the upper lobes.    PLEURA: No pleural effusion.    MEDIASTINUM AND SANDOVAL: No lymphadenopathy.    VESSELS: No pulmonary embolism. Atherosclerotic changes. Left subclavian   vein stent.    HEART: Heart size is enlarged. No pericardial effusion. Coronary artery,   aortic valvular and mitral annular calcifications.    CHEST WALL AND LOWER NECK: Within normal limits.    VISUALIZED UPPER ABDOMEN: Partially imaged atrophic leftkidney. Splenic   calcified granulomas.    BONES: Unchanged diffuse osseous sclerosis, again suggestive of renal   osteodystrophy.    IMPRESSION:     1. No pulmonary embolism.  2. Branching "tree-in-bud" airspace opacities in bilateral lower lobes,   right middle lobe and lingula likely infectious in etiology.  3. Focal groundglass airspace opacities in bilateral upper lobes, not   significant changed dating back to at least a CT of the chest from   10/2/2017, which could represent groundglass nodules.        < end of copied text >      PROBLEM LIST:  64y Female with HEALTH ISSUES - PROBLEM Dx:  Dyspnea: Dyspnea  ESRD (end stage renal disease): ESRD (end stage renal disease)  Coronary artery disease involving native coronary artery of native heart without angina pectoris: Coronary artery disease involving native coronary artery of native heart without angina pectoris  Chronic congestive heart failure, unspecified heart failure type: Chronic congestive heart failure, unspecified heart failure type  Type 1 diabetes mellitus with chronic kidney disease on chronic dialysis: Type 1 diabetes mellitus with chronic kidney disease on chronic dialysis  EKG abnormalities: EKG abnormalities  Chronic obstructive pulmonary disease, unspecified COPD type: Chronic obstructive pulmonary disease, unspecified COPD type  Pneumonia of left lower lobe due to infectious organism: Pneumonia of left lower lobe due to infectious organism            RECS:  supportive care  neb Q6hrs  continue budesonide BID  prednisone 40mg X 5 days (total)  outpatient f/u with repeat CT chest for the GGO/ lymphadenopathy - with Dr Thompson      Please call with any questions.    Chelle Kapoor DO  [facility] Pulmonary/Sleep Medicine  [phone] PULMONARY PROGRESS NOTE    NATHAN MEEKS  MRN-[id number]    Patient is a 64y old  Female who presents with a chief complaint of Hypoxemia 2/2 healthcare associated pneumonia (23 Sep 2019 22:13)      HPI:  overall better  standing in room on room air      ROS:   -    ACTIVE MEDICATION LIST:  MEDICATIONS  (STANDING):  ALBUTerol/ipratropium for Nebulization 3 milliLiter(s) Nebulizer every 6 hours  aspirin enteric coated 81 milliGRAM(s) Oral daily  atorvastatin 40 milliGRAM(s) Oral at bedtime  buDESOnide    Inhalation Suspension 0.5 milliGRAM(s) Inhalation two times a day  cefepime   IVPB 1000 milliGRAM(s) IV Intermittent every 24 hours  chlorhexidine 2% Cloths 1 Application(s) Topical <User Schedule>  clopidogrel Tablet 75 milliGRAM(s) Oral daily  dextrose 5%. 1000 milliLiter(s) (50 mL/Hr) IV Continuous <Continuous>  dextrose 50% Injectable 12.5 Gram(s) IV Push once  dextrose 50% Injectable 25 Gram(s) IV Push once  dextrose 50% Injectable 25 Gram(s) IV Push once  docusate sodium 100 milliGRAM(s) Oral three times a day  heparin  Injectable 5000 Unit(s) SubCutaneous two times a day  hydrALAZINE 25 milliGRAM(s) Oral three times a day  insulin glargine Injectable (LANTUS) 9 Unit(s) SubCutaneous at bedtime  insulin lispro (HumaLOG) corrective regimen sliding scale   SubCutaneous three times a day before meals  insulin lispro (HumaLOG) corrective regimen sliding scale   SubCutaneous at bedtime  insulin lispro (HumaLOG) corrective regimen sliding scale   SubCutaneous <User Schedule>  insulin lispro Injectable (HumaLOG) 5 Unit(s) SubCutaneous three times a day before meals  insulin lispro Injectable (HumaLOG) 2 Unit(s) SubCutaneous at bedtime  isosorbide   dinitrate Tablet (ISORDIL) 10 milliGRAM(s) Oral three times a day  metoprolol succinate ER 50 milliGRAM(s) Oral daily  Nephro-raphael 1 Tablet(s) Oral daily  pantoprazole    Tablet 40 milliGRAM(s) Oral before breakfast  predniSONE   Tablet 40 milliGRAM(s) Oral daily  sevelamer carbonate 800 milliGRAM(s) Oral three times a day with meals    MEDICATIONS  (PRN):  dextrose 40% Gel 15 Gram(s) Oral once PRN Blood Glucose LESS THAN 70 milliGRAM(s)/deciliter  glucagon  Injectable 1 milliGRAM(s) IntraMuscular once PRN Glucose LESS THAN 70 milligrams/deciliter  oxyCODONE    5 mG/acetaminophen 325 mG 1 Tablet(s) Oral every 6 hours PRN Severe Pain (7 - 10)  senna 2 Tablet(s) Oral at bedtime PRN Constipation      EXAM:  Vital Signs Last 24 Hrs  T(C): 36.7 (24 Sep 2019 04:59), Max: 36.9 (23 Sep 2019 11:13)  T(F): 98 (24 Sep 2019 04:59), Max: 98.5 (23 Sep 2019 11:13)  HR: 114 (24 Sep 2019 06:47) (76 - 987)  BP: 137/87 (24 Sep 2019 06:47) (109/75 - 155/81)  BP(mean): --  RR: 18 (24 Sep 2019 04:59) (18 - 18)  SpO2: 95% (24 Sep 2019 04:59) (95% - 97%)    GENERAL: The patient is awake and alert in no apparent distress.     LUNGS: Clear to auscultation without wheezing, rales or rhonchi; respirations unlabored    HEART: Regular rate and rhythm without murmur.                            9.4    7.1   )-----------( 241      ( 23 Sep 2019 05:12 )             30.0       09-24    129<L>  |  90<L>  |  15  ----------------------------<  582<HH>  4.7   |  22  |  3.40<H>    Ca    9.8      24 Sep 2019 06:43  Phos  4.1     09-23  Mg     2.1     09-23      < from: CT Angio Chest w/ IV Cont (09.21.19 @ 18:27) >  EXAM:  CT ANGIO CHEST (W)AW IC                            PROCEDURE DATE:  09/21/2019            INTERPRETATION:  CLINICAL INFORMATION: Chest pain. Evaluate for pulmonary   embolism    COMPARISON: CT chest 4/18/2019, 10/2/2017.    PROCEDURE:   CT Angiography of the Chest.  68 ml of Omnipaque 350 was injected intravenously. 32 ml were discarded.  Sagittal and coronal reformats were performed as well as 3D (MIP)   reconstructions.    FINDINGS:    LUNGS AND AIRWAYS: Patent central airways. Branching "tree-in-bud"   airspace opacities in bilateral lower lobes, right middle lobe and   lingula. 1.3 cm right upper lobe focal groundglass airspace opacity   (series 3, image 109) is not significantly changed. 0.8 cm focal   groundglass versus opacityin the right upper lobe (series 3, image 166)   is not significantly changed. 1.2 cm focal groundglass airspace opacity   in the left upper lobe (series 3, image 92) is not significantly changed.   Bilateral lower lobe subsegmental atelectasis. Biapical scarring. Right   apical blebs. Centrilobular emphysema most prominent in the upper lobes.    PLEURA: No pleural effusion.    MEDIASTINUM AND SANDOVAL: No lymphadenopathy.    VESSELS: No pulmonary embolism. Atherosclerotic changes. Left subclavian   vein stent.    HEART: Heart size is enlarged. No pericardial effusion. Coronary artery,   aortic valvular and mitral annular calcifications.    CHEST WALL AND LOWER NECK: Within normal limits.    VISUALIZED UPPER ABDOMEN: Partially imaged atrophic leftkidney. Splenic   calcified granulomas.    BONES: Unchanged diffuse osseous sclerosis, again suggestive of renal   osteodystrophy.    IMPRESSION:     1. No pulmonary embolism.  2. Branching "tree-in-bud" airspace opacities in bilateral lower lobes,   right middle lobe and lingula likely infectious in etiology.  3. Focal groundglass airspace opacities in bilateral upper lobes, not   significant changed dating back to at least a CT of the chest from   10/2/2017, which could represent groundglass nodules.        < end of copied text >      PROBLEM LIST:  64y Female with HEALTH ISSUES - PROBLEM Dx:  Dyspnea: Dyspnea  ESRD (end stage renal disease): ESRD (end stage renal disease)  Coronary artery disease involving native coronary artery of native heart without angina pectoris: Coronary artery disease involving native coronary artery of native heart without angina pectoris  Chronic congestive heart failure, unspecified heart failure type: Chronic congestive heart failure, unspecified heart failure type  Type 1 diabetes mellitus with chronic kidney disease on chronic dialysis: Type 1 diabetes mellitus with chronic kidney disease on chronic dialysis  EKG abnormalities: EKG abnormalities  Chronic obstructive pulmonary disease, unspecified COPD type: Chronic obstructive pulmonary disease, unspecified COPD type  Pneumonia of left lower lobe due to infectious organism: Pneumonia of left lower lobe due to infectious organism            RECS:  supportive care for viral infection   neb Q6hrs  continue budesonide BID  prednisone 40mg X 5 days (total)  appreciate ID input  outpatient f/u with repeat CT chest for the GGO/ lymphadenopathy - with Dr Thompson      Please call with any questions.    Chelle Kapoor DO  [facility] Pulmonary/Sleep Medicine  [phone]

## 2019-09-24 NOTE — CHART NOTE - NSCHARTNOTEFT_GEN_A_CORE
NP note- hyperglycemia    called by RN for f/s 561 and 585 in the morning. Hx of DMT1 on insulin. admitted with copd exacerbation and PNA on steroid. pt received premeal 5units and sliding scale 6units. added 4untis for now. Endo on board, will f/u endo recommendations.    NP. Tierra Pardo  [id number]

## 2019-09-24 NOTE — CHART NOTE - NSCHARTNOTEFT_GEN_A_CORE
C/C: Informed by RN, Pt unable to tolerate a lower setting of 2 liters NC. Saturation at 89% and appears SOB. NC increased back to 4 liters. Pt seen and examined at bedside.  Reports "I feel like i'm going to die"      HPI:  Patient is a 65 yo F with ESRD (on HD M/Tu/Th/Sa), type 1 DM w/DKA in past, CAD, HFrEF (EF 50% on TTE from 10/18), TIA, and PVD, very recent admission here until yesterday for COPD exacerbation felt 2/2 viral illness from enterovirus now presents with worsening dyspnea and hypoxemia down to 80s (O2 sat 82% on intial ED evaluation per ED provider note). Patient reports that she had been discharged home yesterday and today went for her usual dialysis. However at dialysis felt short of breath and required O2 via NC throughout dialysis session. After dialysis session patient took a car home but did not have O2 supplementation and by the time she reached home she reported that she was more dyspneic and felt very weak-so weak that she could not get out of the car and her son had to help her get out. +chills. Patient reports that chills have been ongoing for a week as has a cough productive of yellowish to green sputum. Worsened weakness and worsened sats are new. No chest pain, no palpitations. No diarrhea. No nausea/vomiting. No focal neurologic symptoms. No skin rash. No headache. +runny nose x 1 week. No sore throat. 	    No family hx relevant to current admission for health associated pna (21 Sep 2019 23:33)      REVIEW OF SYSTEMS:  REVIEW OF SYSTEMS:    CONSTITUTIONAL: No weakness, fevers or chills  EYES/ENT: No visual changes;  No vertigo or throat pain   NECK: No pain or stiffness  RESPIRATORY: No cough, wheezing, hemoptysis; No shortness of breath  CARDIOVASCULAR: No chest pain or palpitations  GASTROINTESTINAL: No abdominal or epigastric pain. No nausea, vomiting, or hematemesis; No diarrhea or constipation. No melena or hematochezia.  GENITOURINARY: No dysuria, frequency or hematuria  NEUROLOGICAL: No numbness or weakness  SKIN: No itching, rashes        FAMILY HISTORY:  FAMILY HISTORY:  Family history of smoking  Family history of hypertension  Family history of cancer (Sibling)        SOCIAL HISTORY:  Marital Status:  (   )    (   ) Single    (   )    (  )   Lives with: (  ) alone  (  ) children   (  ) spouse   (  ) parents  (  ) other  Recent Travel: No recent travel  Occupation:    Substance Use (street drugs): ( x ) never used  (  ) other:  Tobacco Usage:  ( x  ) never smoked   (   ) former smoker   (   ) current smoker  (     ) pack year  Alcohol Usage: None         VITAL SIGNS:   T(C): 36.4 (09-23-19 @ 17:21), Max: 36.9 (09-23-19 @ 11:13)  HR: 87 (09-23-19 @ 21:53)  BP: 122/69 (09-23-19 @ 21:53)  RR: 18 (09-23-19 @ 13:50)  SpO2: 97% (09-23-19 @ 13:50)  Wt(kg): --          LABS:                        9.4    7.1   )-----------( 241      ( 23 Sep 2019 05:12 )             30.0     09-23    138  |  95<L>  |  16  ----------------------------<  283<H>  3.8   |  27  |  4.04<H>    Ca    9.5      23 Sep 2019 05:12  Phos  4.1     09-23  Mg     2.1     09-23               CULTURES:  .Blood  09-21 @ 19:23   No growth to date.  --  --      .Blood  09-21 @ 19:18   No growth to date.  --  --      .Blood  09-15 @ 09:40   No growth at 5 days.  --  --      .Blood  09-15 @ 04:36   No growth at 5 days.  --  --            RADIOLOGY RESULTS:      PHYSICAL EXAM:  General: Awake and alert, no acute distress, non-toxic appearance  HEENT: No mucositis or thrush, mucous membrane pink and moist  CV: S1, S2 present, RRR, no JVD  Pulm: Respirations non-labored, CTA b/l to bases  GI: Abdominal soft, non-tender, +BS x 4  : No CVA tenderness, voiding without difficulty  Ext: No peripheral edema, +2 pedal pulses b/l, no peripheral cyanosis  Neuro: AAO x 3, PERRLA, CN II-XII grossly intact, no neurological deficits    A/P:  This is a 64yFemale with h/o COPD (chronic obstructive pulmonary disease)  Localized enlarged lymph nodes  CHF (congestive heart failure)  Subclavian vein stenosis, left  Cellulitis  DKA, type 1  ACS (acute coronary syndrome)  MI (myocardial infarction)  MI, old  TIA (transient ischemic attack)  PAD (peripheral artery disease)  HLD (hyperlipidemia)  HTN (hypertension)  ESRD (end stage renal disease) on dialysis  Type 1 diabetes mellitus  CVA (cerebral vascular accident)  CAD (coronary artery disease)  Myocardial infarct  Murmur, cardiac  Gout  CVA (cerebral infarction)  Peripheral vascular disease  Coronary artery disease  Diabetes mellitus type 1  ESRD (end stage renal disease)  TIA (transient ischemic attack)  x 3 2005 2 nt to VSD/ASD repair  Diabetes mellitus type II  HTN (hypertension), benign  Hyperlipidemia   admitted for Dyspnea   now .        BELA GUZMAN, ANP-C  Spectra Link # C/C: Informed by RN, Pt unable to tolerate a lower setting of 2 liters NC. Saturation at 89% and appears SOB. NC increased back to 4 liters. Pt seen and examined at bedside.  Reports "I feel like i'm going to die". c/o left foot/leg pain. States that her "heart hurts" and having sob. O2 sat 92% on 4 Liters.      HPI: (ED HPI)  Patient is a 65 yo F with ESRD (on HD M/Tu/Th/Sa), type 1 DM w/DKA in past, CAD, HFrEF (EF 50% on TTE from 10/18), TIA, and PVD, very recent admission here until yesterday for COPD exacerbation felt 2/2 viral illness from enterovirus now presents with worsening dyspnea and hypoxemia down to 80s (O2 sat 82% on intial ED evaluation per ED provider note). Patient reports that she had been discharged home yesterday and today went for her usual dialysis. However at dialysis felt short of breath and required O2 via NC throughout dialysis session. After dialysis session patient took a car home but did not have O2 supplementation and by the time she reached home she reported that she was more dyspneic and felt very weak-so weak that she could not get out of the car and her son had to help her get out. +chills. Patient reports that chills have been ongoing for a week as has a cough productive of yellowish to green sputum. Worsened weakness and worsened sats are new. No chest pain, no palpitations. No diarrhea. No nausea/vomiting. No focal neurologic symptoms. No skin rash. No headache. +runny nose x 1 week. No sore throat. No family hx relevant to current admission for health associated pna (21 Sep 2019 23:33)      REVIEW OF SYSTEMS:    CONSTITUTIONAL: No weakness, fevers or chills  EYES/ENT: No visual changes;  No vertigo or throat pain   NECK: No pain or stiffness  RESPIRATORY: +SOB, No cough, wheezing, hemoptysis;   CARDIOVASCULAR: +chest pain, no dizziness, no lightheadedness  GASTROINTESTINAL: No abdominal or epigastric pain. No nausea, vomiting, or hematemesis; No diarrhea or constipation. No melena or hematochezia.  GENITOURINARY: No dysuria, frequency or hematuria  NEUROLOGICAL: No numbness or weakness  SKIN: No itching, rashes  EXT: Left foot, Left lower leg pain      FAMILY HISTORY:  Family history of smoking  Family history of hypertension  Family history of cancer (Sibling)      VITAL SIGNS:  T(C): 36.4 (09-23-19 @ 17:21), Max: 36.9 (09-23-19 @ 11:13)  HR: 87 (09-23-19 @ 21:53)  BP: 122/69 (09-23-19 @ 21:53)  RR: 18 (09-23-19 @ 13:50)  SpO2: 97% (09-23-19 @ 13:50)            LABS:                        9.4    7.1   )-----------( 241      ( 23 Sep 2019 05:12 )             30.0     09-23    138  |  95<L>  |  16  ----------------------------<  283<H>  3.8   |  27  |  4.04<H>    Ca    9.5      23 Sep 2019 05:12  Phos  4.1     09-23  Mg     2.1     09-23               CULTURES:  .Blood  09-21 @ 19:23   No growth to date.  --  --      .Blood  09-21 @ 19:18   No growth to date.  --  --      .Blood  09-15 @ 09:40   No growth at 5 days.  --  --      .Blood  09-15 @ 04:36   No growth at 5 days.  --  --      RADIOLOGY RESULTS:  < from: CT Angio Chest w/ IV Cont (09.21.19 @ 18:27) >    FINDINGS:    LUNGS AND AIRWAYS: Patent central airways. Branching "tree-in-bud"   airspace opacities in bilateral lower lobes, right middle lobe and   lingula. 1.3 cm right upper lobe focal groundglass airspace opacity   (series 3, image 109) is not significantly changed. 0.8 cm focal   groundglass versus opacityin the right upper lobe (series 3, image 166)   is not significantly changed. 1.2 cm focal groundglass airspace opacity   in the left upper lobe (series 3, image 92) is not significantly changed.   Bilateral lower lobe subsegmental atelectasis. Biapical scarring. Right   apical blebs. Centrilobular emphysema most prominent in the upper lobes.    PLEURA: No pleural effusion.    MEDIASTINUM AND SANDOVAL: No lymphadenopathy.    VESSELS: No pulmonary embolism. Atherosclerotic changes. Left subclavian   vein stent.    HEART: Heart size is enlarged. No pericardial effusion. Coronary artery,   aortic valvular and mitral annular calcifications.    CHEST WALL AND LOWER NECK: Within normal limits.    VISUALIZED UPPER ABDOMEN: Partially imaged atrophic leftkidney. Splenic   calcified granulomas.    BONES: Unchanged diffuse osseous sclerosis, again suggestive of renal   osteodystrophy.    IMPRESSION:     1. No pulmonary embolism.  2. Branching "tree-in-bud" airspace opacities in bilateral lower lobes,   right middle lobe and lingula likely infectious in etiology.  3. Focal groundglass airspace opacities in bilateral upper lobes, not   significant changed dating back to at least a CT of the chest from   10/2/2017, which could represent groundglass nodules.        EKG - No change from prior. See EKG in chart    PHYSICAL EXAM:  General: Awake and alert, no acute distress, non-toxic appearance  HEENT: No mucositis or thrush, mucous membrane pink and moist  CV: S1, S2 present, RRR, no JVD  Pulm: Respirations non-labored, RML & RLL Diminished, Left lobes rhonchi  GI: Abdominal soft, non-tender, +BS x 4  : No CVA tenderness, voiding without difficulty  Ext: Left pedal +2 edema. Left ankle +2 edema. +tender to palpationt to LLE. +1 Left pedal pulse. +2 Right pedal pulse  Neuro: AAO x 3, PERRLA, CN II-XII grossly intact, no neurological deficits    A/P:  This is a 64yFemale with h/o COPD, CHF, DKA, DMT1, ACS, TIA, HTN, ESRD on HD, CVA. Recently hospitalized and returned after 1 day of discharge for dyspnea. Found to be +RVP for Entero/Rhino.   1) PNA  - c/w current antibiotic per ID  - c/w Prednisone, Nebs [id number]  - O2 supplementation with NC. Titrate 88-92%  2) r/o DVT  - c/o chronic Left lower ext pain  - Will check LLE duplex - r/o DVT  - Pt reports instant relief of SOB, chest pain, and left foot pain AFTER standing. States she does not sleep at night because she needs to stand to relieve her pain.        BELA GUZMAN, ANP-C  Spectra Link # [id number] C/C: Informed by RN, Pt unable to tolerate a lower setting of 2 liters NC. Saturation at 89% and appears SOB. NC increased back to 4 liters. Pt seen and examined at bedside.  Reports "I feel like i'm going to die". c/o left foot/leg pain. States that her "heart hurts" and having sob. O2 sat 92% on 4 Liters.      HPI: (ED HPI)  Patient is a 65 yo F with ESRD (on HD M/Tu/Th/Sa), type 1 DM w/DKA in past, CAD, HFrEF (EF 50% on TTE from 10/18), TIA, and PVD, very recent admission here until yesterday for COPD exacerbation felt 2/2 viral illness from enterovirus now presents with worsening dyspnea and hypoxemia down to 80s (O2 sat 82% on intial ED evaluation per ED provider note). Patient reports that she had been discharged home yesterday and today went for her usual dialysis. However at dialysis felt short of breath and required O2 via NC throughout dialysis session. After dialysis session patient took a car home but did not have O2 supplementation and by the time she reached home she reported that she was more dyspneic and felt very weak-so weak that she could not get out of the car and her son had to help her get out. +chills. Patient reports that chills have been ongoing for a week as has a cough productive of yellowish to green sputum. Worsened weakness and worsened sats are new. No chest pain, no palpitations. No diarrhea. No nausea/vomiting. No focal neurologic symptoms. No skin rash. No headache. +runny nose x 1 week. No sore throat. No family hx relevant to current admission for health associated pna (21 Sep 2019 23:33)      REVIEW OF SYSTEMS:    CONSTITUTIONAL: No weakness, fevers or chills  EYES/ENT: No visual changes;  No vertigo or throat pain   NECK: No pain or stiffness  RESPIRATORY: +SOB, No cough, wheezing, hemoptysis;   CARDIOVASCULAR: +chest pain, no dizziness, no lightheadedness  GASTROINTESTINAL: No abdominal or epigastric pain. No nausea, vomiting, or hematemesis; No diarrhea or constipation. No melena or hematochezia.  GENITOURINARY: No dysuria, frequency or hematuria  NEUROLOGICAL: No numbness or weakness  SKIN: No itching, rashes  EXT: Left foot, Left lower leg pain      FAMILY HISTORY:  Family history of smoking  Family history of hypertension  Family history of cancer (Sibling)      VITAL SIGNS:  T(C): 36.4 (09-23-19 @ 17:21), Max: 36.9 (09-23-19 @ 11:13)  HR: 87 (09-23-19 @ 21:53)  BP: 122/69 (09-23-19 @ 21:53)  RR: 18 (09-23-19 @ 13:50)  SpO2: 97% (09-23-19 @ 13:50)            LABS:                        9.4    7.1   )-----------( 241      ( 23 Sep 2019 05:12 )             30.0     09-23    138  |  95<L>  |  16  ----------------------------<  283<H>  3.8   |  27  |  4.04<H>    Ca    9.5      23 Sep 2019 05:12  Phos  4.1     09-23  Mg     2.1     09-23               CULTURES:  .Blood  09-21 @ 19:23   No growth to date.  --  --      .Blood  09-21 @ 19:18   No growth to date.  --  --      .Blood  09-15 @ 09:40   No growth at 5 days.  --  --      .Blood  09-15 @ 04:36   No growth at 5 days.  --  --      RADIOLOGY RESULTS:  < from: CT Angio Chest w/ IV Cont (09.21.19 @ 18:27) >    FINDINGS:    LUNGS AND AIRWAYS: Patent central airways. Branching "tree-in-bud"   airspace opacities in bilateral lower lobes, right middle lobe and   lingula. 1.3 cm right upper lobe focal groundglass airspace opacity   (series 3, image 109) is not significantly changed. 0.8 cm focal   groundglass versus opacityin the right upper lobe (series 3, image 166)   is not significantly changed. 1.2 cm focal groundglass airspace opacity   in the left upper lobe (series 3, image 92) is not significantly changed.   Bilateral lower lobe subsegmental atelectasis. Biapical scarring. Right   apical blebs. Centrilobular emphysema most prominent in the upper lobes.    PLEURA: No pleural effusion.    MEDIASTINUM AND SANDOVAL: No lymphadenopathy.    VESSELS: No pulmonary embolism. Atherosclerotic changes. Left subclavian   vein stent.    HEART: Heart size is enlarged. No pericardial effusion. Coronary artery,   aortic valvular and mitral annular calcifications.    CHEST WALL AND LOWER NECK: Within normal limits.    VISUALIZED UPPER ABDOMEN: Partially imaged atrophic leftkidney. Splenic   calcified granulomas.    BONES: Unchanged diffuse osseous sclerosis, again suggestive of renal   osteodystrophy.    IMPRESSION:     1. No pulmonary embolism.  2. Branching "tree-in-bud" airspace opacities in bilateral lower lobes,   right middle lobe and lingula likely infectious in etiology.  3. Focal groundglass airspace opacities in bilateral upper lobes, not   significant changed dating back to at least a CT of the chest from   10/2/2017, which could represent groundglass nodules.        EKG - No change from prior. See EKG in chart    PHYSICAL EXAM:  General: Awake and alert, no acute distress, non-toxic appearance  HEENT: No mucositis or thrush, mucous membrane pink and moist  CV: S1, S2 present, RRR, no JVD  Pulm: Respirations non-labored, RML & RLL Diminished, Left lobes rhonchi  GI: Abdominal soft, non-tender, +BS x 4  : No CVA tenderness, voiding without difficulty  Ext: Left pedal +2 edema. Left ankle +2 edema. +tender to palpationt to LLE. +1 Left pedal pulse. +2 Right pedal pulse  Neuro: AAO x 3, PERRLA, CN II-XII grossly intact, no neurological deficits    A/P:  This is a 64yFemale with h/o COPD, CHF, DKA, DMT1, ACS, TIA, HTN, ESRD on HD, CVA. Recently hospitalized and returned after 1 day of discharge for dyspnea. Found to be +RVP for Entero/Rhino.   1) PNA  - c/w current antibiotic per ID  - Xopenex x 1 STAT  - c/w Prednisone, Nebs [id number]  - O2 supplementation with NC. Titrate 88-92%  2) r/o DVT  - c/o chronic Left lower ext pain  - Will check LLE duplex - r/o DVT  - Pt reports instant relief of SOB, chest pain, and left foot pain AFTER standing. States she does not sleep at night because she needs to stand to relieve her pain.        BELA GUZMAN, ANP-C  Spectra Link # [id number] C/C: Informed by RN, Pt unable to tolerate a lower setting of 2 liters NC. Saturation at 89% and appears SOB. NC increased back to 4 liters. Pt seen and examined at bedside.  Reports "I feel like i'm going to die". c/o left foot/leg pain. States that her "heart hurts" and having sob. O2 sat 92% on 4 Liters.      HPI: (ED HPI)  Patient is a 65 yo F with ESRD (on HD M/Tu/Th/Sa), type 1 DM w/DKA in past, CAD, HFrEF (EF 50% on TTE from 10/18), TIA, and PVD, very recent admission here until yesterday for COPD exacerbation felt 2/2 viral illness from enterovirus now presents with worsening dyspnea and hypoxemia down to 80s (O2 sat 82% on intial ED evaluation per ED provider note). Patient reports that she had been discharged home yesterday and today went for her usual dialysis. However at dialysis felt short of breath and required O2 via NC throughout dialysis session. After dialysis session patient took a car home but did not have O2 supplementation and by the time she reached home she reported that she was more dyspneic and felt very weak-so weak that she could not get out of the car and her son had to help her get out. +chills. Patient reports that chills have been ongoing for a week as has a cough productive of yellowish to green sputum. Worsened weakness and worsened sats are new. No chest pain, no palpitations. No diarrhea. No nausea/vomiting. No focal neurologic symptoms. No skin rash. No headache. +runny nose x 1 week. No sore throat. No family hx relevant to current admission for health associated pna (21 Sep 2019 23:33)      REVIEW OF SYSTEMS:    CONSTITUTIONAL: No weakness, fevers or chills  EYES/ENT: No visual changes;  No vertigo or throat pain   NECK: No pain or stiffness  RESPIRATORY: +SOB, No cough, wheezing, hemoptysis;   CARDIOVASCULAR: +chest pain, no dizziness, no lightheadedness  GASTROINTESTINAL: No abdominal or epigastric pain. No nausea, vomiting, or hematemesis; No diarrhea or constipation. No melena or hematochezia.  GENITOURINARY: No dysuria, frequency or hematuria  NEUROLOGICAL: No numbness or weakness  SKIN: No itching, rashes  EXT: Left foot, Left lower leg pain      FAMILY HISTORY:  Family history of smoking  Family history of hypertension  Family history of cancer (Sibling)      VITAL SIGNS:  T(C): 36.4 (09-23-19 @ 17:21), Max: 36.9 (09-23-19 @ 11:13)  HR: 87 (09-23-19 @ 21:53)  BP: 122/69 (09-23-19 @ 21:53)  RR: 18 (09-23-19 @ 13:50)  SpO2: 97% (09-23-19 @ 13:50)            LABS:                        9.4    7.1   )-----------( 241      ( 23 Sep 2019 05:12 )             30.0     09-23    138  |  95<L>  |  16  ----------------------------<  283<H>  3.8   |  27  |  4.04<H>    Ca    9.5      23 Sep 2019 05:12  Phos  4.1     09-23  Mg     2.1     09-23               CULTURES:  .Blood  09-21 @ 19:23   No growth to date.  --  --      .Blood  09-21 @ 19:18   No growth to date.  --  --      .Blood  09-15 @ 09:40   No growth at 5 days.  --  --      .Blood  09-15 @ 04:36   No growth at 5 days.  --  --      RADIOLOGY RESULTS:  < from: CT Angio Chest w/ IV Cont (09.21.19 @ 18:27) >    FINDINGS:    LUNGS AND AIRWAYS: Patent central airways. Branching "tree-in-bud"   airspace opacities in bilateral lower lobes, right middle lobe and   lingula. 1.3 cm right upper lobe focal groundglass airspace opacity   (series 3, image 109) is not significantly changed. 0.8 cm focal   groundglass versus opacityin the right upper lobe (series 3, image 166)   is not significantly changed. 1.2 cm focal groundglass airspace opacity   in the left upper lobe (series 3, image 92) is not significantly changed.   Bilateral lower lobe subsegmental atelectasis. Biapical scarring. Right   apical blebs. Centrilobular emphysema most prominent in the upper lobes.    PLEURA: No pleural effusion.    MEDIASTINUM AND SANDOVAL: No lymphadenopathy.    VESSELS: No pulmonary embolism. Atherosclerotic changes. Left subclavian   vein stent.    HEART: Heart size is enlarged. No pericardial effusion. Coronary artery,   aortic valvular and mitral annular calcifications.    CHEST WALL AND LOWER NECK: Within normal limits.    VISUALIZED UPPER ABDOMEN: Partially imaged atrophic leftkidney. Splenic   calcified granulomas.    BONES: Unchanged diffuse osseous sclerosis, again suggestive of renal   osteodystrophy.    IMPRESSION:     1. No pulmonary embolism.  2. Branching "tree-in-bud" airspace opacities in bilateral lower lobes,   right middle lobe and lingula likely infectious in etiology.  3. Focal groundglass airspace opacities in bilateral upper lobes, not   significant changed dating back to at least a CT of the chest from   10/2/2017, which could represent groundglass nodules.        EKG - No change from prior. See EKG in chart    PHYSICAL EXAM:  General: Awake and alert, no acute distress, non-toxic appearance  HEENT: No mucositis or thrush, mucous membrane pink and moist  CV: S1, S2 present, RRR, no JVD  Pulm: Respirations non-labored, RML & RLL Diminished, Left lobes rhonchi  GI: Abdominal soft, non-tender, +BS x 4  : No CVA tenderness, voiding without difficulty  Ext: Left pedal +2 edema. Left ankle +2 edema. +tender to palpationt to LLE. +1 Left pedal pulse. +2 Right pedal pulse  Neuro: AAO x 3, PERRLA, CN II-XII grossly intact, no neurological deficits    A/P:  This is a 64yFemale with h/o COPD, CHF, DKA, DMT1, ACS, TIA, HTN, ESRD on HD, CVA. Recently hospitalized and returned after 1 day of discharge for dyspnea. Found to be +RVP for Entero/Rhino.   1) PNA  - c/w current antibiotic per ID  - Xopenex x 1 STAT  - c/w Prednisone, Nebs [id number]  - O2 supplementation with NC. Titrate 88-92%  2) r/o DVT  - c/o chronic Left lower ext pain  - Will check LLE duplex - r/o DVT  - Pt reports instant relief of SOB, chest pain, and left foot pain AFTER standing. States she does not sleep at night because she needs to stand to relieve her pain.  - Will d/w Primary team in AM        SHEELA CHRISTINE-C  Spectra Link # [id number]

## 2019-09-24 NOTE — PROGRESS NOTE ADULT - SUBJECTIVE AND OBJECTIVE BOX
DIABETES FOLLOW UP NOTE: Saw pt earlier today  INTERVAL HX: 65y/o M w/h/o uncontrolled TIDM (HbA1c 8.1% 9/19) c/b neuropathy and retinopathy as well as CAD s/p multiple stents, CHF (EF 50% 10/2018), TIA, PVD s/p b/l fem-pop bypass, ESRD> HD. Pt was discharged last week after having COPD exacerbation felt 2/2 viral illness from enterovirus now presents with worsening dyspnea and hypoxemia. Pt was stable until today when she was started on Prednisone without making any insulin adjustments> Pt now with BG >600s. Asymptomatic for DKA but noted positive AG due to severe glucotoxicity. Additionally, pt's FBG was already >500s this am > pt reports night staff told her she needed to eat something after getting Lantus dose. Per pt she received and ate 3 fig newtons around 12mn event though she was not hungry.     Review of Systems:  General: C/o on and off cough.  Cardiovascular: No chest pain, palpitations  Respiratory: denies SOB at time of visit but reports having it yesterday afternoon when it got hot. + cough  GI: No nausea, vomiting, abdominal pain  Endocrine: no polyuria, polydipsia or S&Sx of hypoglycemia    Allergies    No Known Allergies    Intolerances      MEDICATIONS:  atorvastatin 40 milliGRAM(s) Oral at bedtime  cefepime   IVPB 1000 milliGRAM(s) IV Intermittent every 24 hours  insulin glargine Injectable (LANTUS) 9 Unit(s) SubCutaneous at bedtime  insulin lispro (HumaLOG) corrective regimen sliding scale   SubCutaneous three times a day before meals  insulin lispro (HumaLOG) corrective regimen sliding scale   SubCutaneous at bedtime  insulin lispro (HumaLOG) corrective regimen sliding scale   SubCutaneous <User Schedule>  insulin lispro Injectable (HumaLOG) 5 Unit(s) SubCutaneous three times a day before meals  insulin lispro Injectable (HumaLOG) 2 Unit(s) SubCutaneous at bedtime  insulin lispro Injectable (HumaLOG) 12 Unit(s) SubCutaneous once  predniSONE   Tablet 40 milliGRAM(s) Oral daily        PHYSICAL EXAM:  Vital Signs Last 24 Hrs  T(C): 36.8 (09-24-19 @ 11:17), Max: 36.8 (09-24-19 @ 11:17)  T(F): 98.2 (09-24-19 @ 11:17), Max: 98.2 (09-24-19 @ 11:17)  HR: 89 (09-24-19 @ 13:31) (76 - 987)  BP: 117/63 (09-24-19 @ 13:31) (117/63 - 155/81)  BP(mean): --  RR: 18 (09-24-19 @ 11:17) (18 - 18)  SpO2: 95% (09-24-19 @ 11:17) (95% - 97%)  GENERAL: Female sitting in chair in NAD  Abdomen: Soft, nontender, non distended  Extremities: Warm, trace edema in LEs. Improved since last admission  NEURO: A&O X3    LABS:  POCT Blood Glucose.: >600 mg/dL (09-24-19 @ 11:35)  POCT Blood Glucose.: >600 mg/dL (09-24-19 @ 11:31)  POCT Blood Glucose.: 580 mg/dL (09-24-19 @ 07:44)  POCT Blood Glucose.: 561 mg/dL (09-24-19 @ 07:43)  POCT Blood Glucose.: 354 mg/dL (09-24-19 @ 02:01)  POCT Blood Glucose.: 199 mg/dL (09-23-19 @ 21:24)  POCT Blood Glucose.: 137 mg/dL (09-23-19 @ 19:54)  POCT Blood Glucose.: 134 mg/dL (09-23-19 @ 18:11)  POCT Blood Glucose.: 189 mg/dL (09-23-19 @ 11:40)  POCT Blood Glucose.: 341 mg/dL (09-23-19 @ 08:02)  POCT Blood Glucose.: 236 mg/dL (09-22-19 @ 21:18)  POCT Blood Glucose.: 206 mg/dL (09-22-19 @ 18:17)  POCT Blood Glucose.: 331 mg/dL (09-22-19 @ 11:48)  POCT Blood Glucose.: 378 mg/dL (09-22-19 @ 07:45)  POCT Blood Glucose.: 441 mg/dL (09-22-19 @ 05:58)  POCT Blood Glucose.: 428 mg/dL (09-22-19 @ 05:55)  POCT Blood Glucose.: 349 mg/dL (09-21-19 @ 22:31)  POCT Blood Glucose.: 276 mg/dL (09-21-19 @ 19:17)  POCT Blood Glucose.: 237 mg/dL (09-21-19 @ 16:31)                            10.9   7.17  )-----------( 203      ( 24 Sep 2019 09:30 )             35.0       09-24    128<L>  |  84<L>  |  19  ----------------------------<  x   4.6   |  21<L>  |  3.78<H>    EGFR if : 14<L>  EGFR if non : 12<L>    Ca    10.7<H>      09-24  Mg     2.1     09-23  Phos  4.1     09-23    TPro  7.9  /  Alb  4.4  /  TBili  0.3  /  DBili  x   /  AST  19  /  ALT  19  /  AlkPhos  155<H>  09-21    Hemoglobin A1C, Whole Blood: 8.1 % <H> [4.0 - 5.6] (09-16-19 @ 08:04) DIABETES FOLLOW UP NOTE: Saw pt earlier today  INTERVAL HX: 63y/o M w/h/o uncontrolled TIDM (HbA1c 8.1% 9/19) c/b neuropathy and retinopathy as well as CAD s/p multiple stents, CHF (EF 50% 10/2018), TIA, PVD s/p b/l fem-pop bypass, ESRD> HD. Pt was discharged last week after having COPD exacerbation felt 2/2 viral illness from enterovirus now presents with worsening dyspnea and hypoxemia. Pt was stable until today when she was started on Prednisone without making any insulin adjustments> Pt now with BG >600s. Asymptomatic for DKA but noted positive AG due to severe glucotoxicity. Additionally, pt's FBG was already >500s this am > pt reports night staff told her she needed to eat something after getting Lantus insulin. Per pt she didn't want to eat anything but received and ate 2 packs of Beto crackers and one pack of Ann [place]one around 12mn.    Review of Systems:  General: C/o on and off cough.  Cardiovascular: No chest pain, palpitations  Respiratory: denies SOB at time of visit but reports having it yesterday afternoon when it got hot. + cough  GI: No nausea, vomiting, abdominal pain  Endocrine: no polyuria, polydipsia or S&Sx of hypoglycemia    Allergies    No Known Allergies    Intolerances      MEDICATIONS:  atorvastatin 40 milliGRAM(s) Oral at bedtime  cefepime   IVPB 1000 milliGRAM(s) IV Intermittent every 24 hours  insulin glargine Injectable (LANTUS) 9 Unit(s) SubCutaneous at bedtime  insulin lispro (HumaLOG) corrective regimen sliding scale   SubCutaneous three times a day before meals  insulin lispro (HumaLOG) corrective regimen sliding scale   SubCutaneous at bedtime  insulin lispro (HumaLOG) corrective regimen sliding scale   SubCutaneous <User Schedule>  insulin lispro Injectable (HumaLOG) 5 Unit(s) SubCutaneous three times a day before meals  insulin lispro Injectable (HumaLOG) 2 Unit(s) SubCutaneous at bedtime  insulin lispro Injectable (HumaLOG) 12 Unit(s) SubCutaneous once  predniSONE   Tablet 40 milliGRAM(s) Oral daily        PHYSICAL EXAM:  Vital Signs Last 24 Hrs  T(C): 36.8 (09-24-19 @ 11:17), Max: 36.8 (09-24-19 @ 11:17)  T(F): 98.2 (09-24-19 @ 11:17), Max: 98.2 (09-24-19 @ 11:17)  HR: 89 (09-24-19 @ 13:31) (76 - 987)  BP: 117/63 (09-24-19 @ 13:31) (117/63 - 155/81)  BP(mean): --  RR: 18 (09-24-19 @ 11:17) (18 - 18)  SpO2: 95% (09-24-19 @ 11:17) (95% - 97%)  GENERAL: Female sitting in chair in NAD  Abdomen: Soft, nontender, non distended  Extremities: Warm, trace edema in LEs. Improved since last admission  NEURO: A&O X3    LABS:  POCT Blood Glucose.: >600 mg/dL (09-24-19 @ 11:35)  POCT Blood Glucose.: >600 mg/dL (09-24-19 @ 11:31)  POCT Blood Glucose.: 580 mg/dL (09-24-19 @ 07:44)  POCT Blood Glucose.: 561 mg/dL (09-24-19 @ 07:43)  POCT Blood Glucose.: 354 mg/dL (09-24-19 @ 02:01)  POCT Blood Glucose.: 199 mg/dL (09-23-19 @ 21:24)  POCT Blood Glucose.: 137 mg/dL (09-23-19 @ 19:54)  POCT Blood Glucose.: 134 mg/dL (09-23-19 @ 18:11)  POCT Blood Glucose.: 189 mg/dL (09-23-19 @ 11:40)  POCT Blood Glucose.: 341 mg/dL (09-23-19 @ 08:02)  POCT Blood Glucose.: 236 mg/dL (09-22-19 @ 21:18)  POCT Blood Glucose.: 206 mg/dL (09-22-19 @ 18:17)  POCT Blood Glucose.: 331 mg/dL (09-22-19 @ 11:48)  POCT Blood Glucose.: 378 mg/dL (09-22-19 @ 07:45)  POCT Blood Glucose.: 441 mg/dL (09-22-19 @ 05:58)  POCT Blood Glucose.: 428 mg/dL (09-22-19 @ 05:55)  POCT Blood Glucose.: 349 mg/dL (09-21-19 @ 22:31)  POCT Blood Glucose.: 276 mg/dL (09-21-19 @ 19:17)  POCT Blood Glucose.: 237 mg/dL (09-21-19 @ 16:31)                            10.9   7.17  )-----------( 203      ( 24 Sep 2019 09:30 )             35.0       09-24    128<L>  |  84<L>  |  19  ----------------------------<  x   4.6   |  21<L>  |  3.78<H>    EGFR if : 14<L>  EGFR if non : 12<L>    Ca    10.7<H>      09-24  Mg     2.1     09-23  Phos  4.1     09-23    TPro  7.9  /  Alb  4.4  /  TBili  0.3  /  DBili  x   /  AST  19  /  ALT  19  /  AlkPhos  155<H>  09-21    Hemoglobin A1C, Whole Blood: 8.1 % <H> [4.0 - 5.6] (09-16-19 @ 08:04)

## 2019-09-24 NOTE — PROGRESS NOTE ADULT - ASSESSMENT
65 yo F hx DM, CAD, ESRD, CHF, MR, AS, COPD, PVD s/p b/l fem-pop, hilar adenopathy,  recently here with fever and viral URI.  Brief abx given, pt remained afebrile, resp status stable.    She returns with increasing SOB, weakness, chills, persistent cough.  Afebrile.  WBC slightly low with normal diff.  Trop elevated.    r/o post viral pneumonia vs continuum of same process (PCR still with same EV/RV), now with component of CHF.    CT findings noted; again, ?of new infection/pneumonia or lag- CT not performed on recent admission.    Plan:   will follow on Cefepime 1 g daily, but may limit abx to 5 days total  Check MRSA screen, still pending  Follow temps and CBC/diff   serial CXR  pulm f/u appreciated 65 yo F hx DM, CAD, ESRD, CHF, MR, AS, COPD, PVD s/p b/l fem-pop, hilar adenopathy,  recently here with fever and viral URI.  Brief abx given, pt remained afebrile, resp status stable.    She returns with increasing SOB, weakness, chills, persistent cough.  Afebrile.  WBC slightly low with normal diff.  Trop elevated.    r/o post viral pneumonia vs continuum of same process (PCR still with same EV/RV), now with component of CHF.    CT findings noted; again, ?of new infection/pneumonia or lag- CT not performed on recent admission.    Plan:  will follow on Cefepime 1 g daily, but may limit abx to 5 days total  Check MRSA screen, still pending  Follow temps and CBC/diff   serial CXR  pulm f/u appreciated

## 2019-09-24 NOTE — PROGRESS NOTE ADULT - SUBJECTIVE AND OBJECTIVE BOX
CC: f/u for possible pneumonia    Patient reports no fever, feels better, no cough    REVIEW OF SYSTEMS:  All other review of systems negative (Comprehensive ROS)    Antimicrobials Day #  :day 3 vanco and cefepime    cefepime   IVPB 1000 milliGRAM(s) IV Intermittent every 24 hours      Other Medications Reviewed    Vital Signs Last 24 Hrs  T(C): 36.8 (24 Sep 2019 11:17), Max: 36.8 (24 Sep 2019 11:17)  T(F): 98.2 (24 Sep 2019 11:17), Max: 98.2 (24 Sep 2019 11:17)  HR: 89 (24 Sep 2019 11:17) (76 - 987)  BP: 119/72 (24 Sep 2019 11:17) (119/72 - 155/81)  BP(mean): --  RR: 18 (24 Sep 2019 11:17) (18 - 18)  SpO2: 95% (24 Sep 2019 11:17) (95% - 97%)    PHYSICAL EXAM:  General: alert, no acute distress  Eyes:  anicteric, no conjunctival injection, no discharge  Oropharynx: no lesions or injection 	  Neck: supple, without adenopathy  Lungs: coarse BS  Heart: regular rate and rhythm; +heraclio  Abdomen: soft, nondistended, nontender, without mass or organomegaly  Skin: no lesions  Extremities: no clubbing, cyanosis, or edema  Neurologic: alert, oriented, moves all extremities    LAB RESULTS:                                        10.9   7.17  )-----------( 203      ( 24 Sep 2019 09:30 )             35.0   09-24    128<L>  |  84<L>  |  19  ----------------------------<  x   4.6   |  21<L>  |  3.78<H>    Ca    10.7<H>      24 Sep 2019 12:36  Phos  4.1     09-23  Mg     2.1     09-23            MICROBIOLOGY:  RECENT CULTURES:  09-21 @ 19:23 .Blood     No growth to date.      09-21 @ 19:18 .Blood     No growth to date.          RADIOLOGY REVIEWED:    < from: CT Angio Chest w/ IV Cont (09.21.19 @ 18:27) >  IMPRESSION:     1. No pulmonary embolism.  2. Branching "tree-in-bud" airspace opacities in bilateral lower lobes,   right middle lobe and lingula likely infectious in etiology.  3. Focal groundglass airspace opacities in bilateral upper lobes, not   significant changed dating back to at least a CT of the chest from   10/2/2017, which could represent groundglass nodules.    < end of copied text >

## 2019-09-24 NOTE — CHART NOTE - NSCHARTNOTEFT_GEN_A_CORE
Pt remains glucotoxic due to steroid effect. Noted BMP results with slight improvement on BG levels and AG.  Awaiting acetone/PH results. Also negative Beta Hydroxy-Butyrate. Per team pt has been NPO and getting IVFs.  Pt still without any symptoms of DKA.  Recommending MICU to evaluate pt since she would benefit from insulin drip to resolve glucotoxicity and will help assess total insulin requirements while on steroid therapy.     Spoke to attending endocrinologist Dr Colbert who recommended the following:     -Keep pt NPO for now  -Test BG [id number]  -Give 10 units x1 now for BG >500s (done)   -Give 10 units of Humalog at 8pm if BGs remain >300s  -Give Lantus 9 units tonight.  -Increase Humalog correction scale to moderate dose q6h while on steroids and NPO.  -Send BMP q4h and call endo team with results. [phone]  -NPH 5 units q am while on Prednisone 40mg daily in addition to Lantus dose tonight  -Please contact endo team if pt remains with BG >300s or hypoglycemic.

## 2019-09-24 NOTE — PROGRESS NOTE ADULT - PROBLEM SELECTOR PLAN 1
-Test BG ac meals/HS/2AM and 3pm today,  -Place pt NPO for now. Spoke to pt about the need to withhold food for now  -Recommended Humalog 12 units stat (Given at 1pm as per team) plus Lantus 4 untis x1 stat.  -Start IVF per primary team to prevent dehydration from glucotoxicity (renal dosing)  -BMP plus acetones plus ABG at 4pm today. If on DKA pt will need insulin drip > transfer to MICU.  -Will adjust insulin regimen once BG more stable.

## 2019-09-25 LAB
ANION GAP SERPL CALC-SCNC: 20 MMOL/L — HIGH (ref 5–17)
ANION GAP SERPL CALC-SCNC: 21 MMOL/L — HIGH (ref 5–17)
B-OH-BUTYR SERPL-SCNC: 0 MMOL/L — SIGNIFICANT CHANGE UP
BUN SERPL-MCNC: 29 MG/DL — HIGH (ref 7–23)
BUN SERPL-MCNC: 30 MG/DL — HIGH (ref 7–23)
CALCIUM SERPL-MCNC: 10.6 MG/DL — HIGH (ref 8.4–10.5)
CALCIUM SERPL-MCNC: 9.6 MG/DL — SIGNIFICANT CHANGE UP (ref 8.4–10.5)
CHLORIDE SERPL-SCNC: 89 MMOL/L — LOW (ref 96–108)
CHLORIDE SERPL-SCNC: 91 MMOL/L — LOW (ref 96–108)
CO2 SERPL-SCNC: 23 MMOL/L — SIGNIFICANT CHANGE UP (ref 22–31)
CO2 SERPL-SCNC: 24 MMOL/L — SIGNIFICANT CHANGE UP (ref 22–31)
CREAT SERPL-MCNC: 4.77 MG/DL — HIGH (ref 0.5–1.3)
CREAT SERPL-MCNC: 5.08 MG/DL — HIGH (ref 0.5–1.3)
GAS PNL BLDA: SIGNIFICANT CHANGE UP
GLUCOSE BLDC GLUCOMTR-MCNC: 114 MG/DL — HIGH (ref 70–99)
GLUCOSE BLDC GLUCOMTR-MCNC: 171 MG/DL — HIGH (ref 70–99)
GLUCOSE BLDC GLUCOMTR-MCNC: 199 MG/DL — HIGH (ref 70–99)
GLUCOSE BLDC GLUCOMTR-MCNC: 206 MG/DL — HIGH (ref 70–99)
GLUCOSE BLDC GLUCOMTR-MCNC: 212 MG/DL — HIGH (ref 70–99)
GLUCOSE BLDC GLUCOMTR-MCNC: 243 MG/DL — HIGH (ref 70–99)
GLUCOSE BLDC GLUCOMTR-MCNC: 337 MG/DL — HIGH (ref 70–99)
GLUCOSE SERPL-MCNC: 116 MG/DL — HIGH (ref 70–99)
GLUCOSE SERPL-MCNC: 189 MG/DL — HIGH (ref 70–99)
HCT VFR BLD CALC: 30.1 % — LOW (ref 34.5–45)
HGB BLD-MCNC: 9.5 G/DL — LOW (ref 11.5–15.5)
MCHC RBC-ENTMCNC: 31.6 GM/DL — LOW (ref 32–36)
MCHC RBC-ENTMCNC: 33.4 PG — SIGNIFICANT CHANGE UP (ref 27–34)
MCV RBC AUTO: 106 FL — HIGH (ref 80–100)
PLATELET # BLD AUTO: 225 K/UL — SIGNIFICANT CHANGE UP (ref 150–400)
POTASSIUM SERPL-MCNC: 3.5 MMOL/L — SIGNIFICANT CHANGE UP (ref 3.5–5.3)
POTASSIUM SERPL-MCNC: 3.5 MMOL/L — SIGNIFICANT CHANGE UP (ref 3.5–5.3)
POTASSIUM SERPL-SCNC: 3.5 MMOL/L — SIGNIFICANT CHANGE UP (ref 3.5–5.3)
POTASSIUM SERPL-SCNC: 3.5 MMOL/L — SIGNIFICANT CHANGE UP (ref 3.5–5.3)
RBC # BLD: 2.85 M/UL — LOW (ref 3.8–5.2)
RBC # FLD: 13.2 % — SIGNIFICANT CHANGE UP (ref 10.3–14.5)
SODIUM SERPL-SCNC: 134 MMOL/L — LOW (ref 135–145)
SODIUM SERPL-SCNC: 134 MMOL/L — LOW (ref 135–145)
WBC # BLD: 7.4 K/UL — SIGNIFICANT CHANGE UP (ref 3.8–10.5)
WBC # FLD AUTO: 7.4 K/UL — SIGNIFICANT CHANGE UP (ref 3.8–10.5)

## 2019-09-25 PROCEDURE — 99233 SBSQ HOSP IP/OBS HIGH 50: CPT

## 2019-09-25 RX ORDER — INSULIN LISPRO 100/ML
8 VIAL (ML) SUBCUTANEOUS
Refills: 0 | Status: DISCONTINUED | OUTPATIENT
Start: 2019-09-25 | End: 2019-09-25

## 2019-09-25 RX ORDER — INSULIN LISPRO 100/ML
VIAL (ML) SUBCUTANEOUS AT BEDTIME
Refills: 0 | Status: DISCONTINUED | OUTPATIENT
Start: 2019-09-25 | End: 2019-09-26

## 2019-09-25 RX ORDER — POTASSIUM CHLORIDE 20 MEQ
20 PACKET (EA) ORAL ONCE
Refills: 0 | Status: COMPLETED | OUTPATIENT
Start: 2019-09-25 | End: 2019-09-25

## 2019-09-25 RX ORDER — INSULIN LISPRO 100/ML
VIAL (ML) SUBCUTANEOUS
Refills: 0 | Status: DISCONTINUED | OUTPATIENT
Start: 2019-09-25 | End: 2019-09-25

## 2019-09-25 RX ORDER — ERYTHROPOIETIN 10000 [IU]/ML
10000 INJECTION, SOLUTION INTRAVENOUS; SUBCUTANEOUS ONCE
Refills: 0 | Status: COMPLETED | OUTPATIENT
Start: 2019-09-25 | End: 2019-09-25

## 2019-09-25 RX ORDER — CEFEPIME 1 G/1
1000 INJECTION, POWDER, FOR SOLUTION INTRAMUSCULAR; INTRAVENOUS EVERY 24 HOURS
Refills: 0 | Status: COMPLETED | OUTPATIENT
Start: 2019-09-25 | End: 2019-09-26

## 2019-09-25 RX ORDER — INSULIN LISPRO 100/ML
VIAL (ML) SUBCUTANEOUS
Refills: 0 | Status: DISCONTINUED | OUTPATIENT
Start: 2019-09-25 | End: 2019-09-26

## 2019-09-25 RX ORDER — INSULIN LISPRO 100/ML
5 VIAL (ML) SUBCUTANEOUS
Refills: 0 | Status: DISCONTINUED | OUTPATIENT
Start: 2019-09-25 | End: 2019-09-25

## 2019-09-25 RX ORDER — INSULIN LISPRO 100/ML
8 VIAL (ML) SUBCUTANEOUS
Refills: 0 | Status: DISCONTINUED | OUTPATIENT
Start: 2019-09-25 | End: 2019-09-26

## 2019-09-25 RX ADMIN — Medication 8 UNIT(S): at 18:32

## 2019-09-25 RX ADMIN — ERYTHROPOIETIN 10000 UNIT(S): 10000 INJECTION, SOLUTION INTRAVENOUS; SUBCUTANEOUS at 15:37

## 2019-09-25 RX ADMIN — Medication 4: at 22:28

## 2019-09-25 RX ADMIN — Medication 3 MILLILITER(S): at 12:49

## 2019-09-25 RX ADMIN — Medication 3 MILLILITER(S): at 00:20

## 2019-09-25 RX ADMIN — CEFEPIME 100 MILLIGRAM(S): 1 INJECTION, POWDER, FOR SOLUTION INTRAMUSCULAR; INTRAVENOUS at 12:49

## 2019-09-25 RX ADMIN — ISOSORBIDE DINITRATE 10 MILLIGRAM(S): 5 TABLET ORAL at 13:53

## 2019-09-25 RX ADMIN — Medication 25 MILLIGRAM(S): at 13:52

## 2019-09-25 RX ADMIN — Medication 0.5 MILLIGRAM(S): at 06:33

## 2019-09-25 RX ADMIN — ISOSORBIDE DINITRATE 10 MILLIGRAM(S): 5 TABLET ORAL at 22:28

## 2019-09-25 RX ADMIN — Medication 40 MILLIGRAM(S): at 09:13

## 2019-09-25 RX ADMIN — Medication 81 MILLIGRAM(S): at 12:51

## 2019-09-25 RX ADMIN — Medication 3 MILLILITER(S): at 21:00

## 2019-09-25 RX ADMIN — Medication 20 MILLIEQUIVALENT(S): at 12:49

## 2019-09-25 RX ADMIN — Medication 8 UNIT(S): at 09:52

## 2019-09-25 RX ADMIN — OXYCODONE AND ACETAMINOPHEN 1 TABLET(S): 5; 325 TABLET ORAL at 13:52

## 2019-09-25 RX ADMIN — HUMAN INSULIN 5 UNIT(S): 100 INJECTION, SUSPENSION SUBCUTANEOUS at 09:53

## 2019-09-25 RX ADMIN — SEVELAMER CARBONATE 800 MILLIGRAM(S): 2400 POWDER, FOR SUSPENSION ORAL at 12:49

## 2019-09-25 RX ADMIN — OXYCODONE AND ACETAMINOPHEN 1 TABLET(S): 5; 325 TABLET ORAL at 22:36

## 2019-09-25 RX ADMIN — Medication 3: at 09:51

## 2019-09-25 RX ADMIN — Medication 4: at 06:32

## 2019-09-25 RX ADMIN — INSULIN GLARGINE 9 UNIT(S): 100 INJECTION, SOLUTION SUBCUTANEOUS at 22:29

## 2019-09-25 RX ADMIN — CEFEPIME 100 MILLIGRAM(S): 1 INJECTION, POWDER, FOR SOLUTION INTRAMUSCULAR; INTRAVENOUS at 06:33

## 2019-09-25 RX ADMIN — CHLORHEXIDINE GLUCONATE 1 APPLICATION(S): 213 SOLUTION TOPICAL at 06:33

## 2019-09-25 RX ADMIN — ISOSORBIDE DINITRATE 10 MILLIGRAM(S): 5 TABLET ORAL at 06:32

## 2019-09-25 RX ADMIN — Medication 5: at 12:03

## 2019-09-25 RX ADMIN — Medication 3: at 18:31

## 2019-09-25 RX ADMIN — Medication 3 MILLILITER(S): at 06:32

## 2019-09-25 RX ADMIN — Medication 8 UNIT(S): at 13:52

## 2019-09-25 RX ADMIN — ATORVASTATIN CALCIUM 40 MILLIGRAM(S): 80 TABLET, FILM COATED ORAL at 22:28

## 2019-09-25 RX ADMIN — OXYCODONE AND ACETAMINOPHEN 1 TABLET(S): 5; 325 TABLET ORAL at 14:42

## 2019-09-25 RX ADMIN — CLOPIDOGREL BISULFATE 75 MILLIGRAM(S): 75 TABLET, FILM COATED ORAL at 12:49

## 2019-09-25 NOTE — PROGRESS NOTE ADULT - ASSESSMENT
63y/o M w/h/o uncontrolled TIDM (HbA1c 8.1% 9/19) c/b neuropathy and retinopathy as well as CAD s/p multiple stents, CHF (EF 50% 10/2018), TIA, PVD s/p b/l fem-pop bypass, ESRD> HD. Recent admission with COPD exacerbation felt 2/2 viral illness from enterovirus now presents with worsening dyspnea and hypoxemia. Now treated for PNA > on antibiotic > started steroid therapy yesterday with rebound glucotoxicity requiring much higher doses of insulin. BG better today. BG goal while on steroids is 100s to mid 200s since this pt is cery insulin sensitive and has wide BG fluctuations even while off steroids.

## 2019-09-25 NOTE — PROGRESS NOTE ADULT - SUBJECTIVE AND OBJECTIVE BOX
Cardiovascular Disease Progress Note    Overnight events: hyperglycemic overnight in setting of steroids and infection. currently denies any cp/sob/palps/dizziness  Otherwise review of systems negative    Objective Findings:  T(C): 36.5 (09-25-19 @ 04:14), Max: 37 (09-25-19 @ 00:13)  HR: 72 (09-25-19 @ 06:24) (72 - 89)  BP: 109/63 (09-25-19 @ 06:24) (109/62 - 152/74)  RR: 18 (09-25-19 @ 06:24) (17 - 18)  SpO2: 97% (09-25-19 @ 06:24) (95% - 100%)  Wt(kg): --  Daily     Daily       Physical Exam:  Gen: NAD  HEENT: EOMI  CV: RRR, normal S1 + S2, no m/r/g  Lungs: course breath sounds  Abd: soft, non-tender  Ext: No edema    Telemetry: nsr pvc    Laboratory Data:                        9.5    7.4   )-----------( 225      ( 25 Sep 2019 06:22 )             30.1     09-25    134<L>  |  91<L>  |  30<H>  ----------------------------<  189<H>  3.5   |  23  |  5.08<H>    Ca    9.6      25 Sep 2019 06:22                Inpatient Medications:  MEDICATIONS  (STANDING):  ALBUTerol/ipratropium for Nebulization 3 milliLiter(s) Nebulizer every 6 hours  aspirin enteric coated 81 milliGRAM(s) Oral daily  atorvastatin 40 milliGRAM(s) Oral at bedtime  buDESOnide    Inhalation Suspension 0.5 milliGRAM(s) Inhalation two times a day  cefepime   IVPB 1000 milliGRAM(s) IV Intermittent every 8 hours  chlorhexidine 2% Cloths 1 Application(s) Topical <User Schedule>  clopidogrel Tablet 75 milliGRAM(s) Oral daily  dextrose 5%. 1000 milliLiter(s) (50 mL/Hr) IV Continuous <Continuous>  dextrose 50% Injectable 12.5 Gram(s) IV Push once  dextrose 50% Injectable 25 Gram(s) IV Push once  dextrose 50% Injectable 25 Gram(s) IV Push once  docusate sodium 100 milliGRAM(s) Oral three times a day  heparin  Injectable 5000 Unit(s) SubCutaneous two times a day  hydrALAZINE 25 milliGRAM(s) Oral three times a day  insulin glargine Injectable (LANTUS) 9 Unit(s) SubCutaneous at bedtime  insulin lispro (HumaLOG) corrective regimen sliding scale   SubCutaneous every 4 hours  insulin NPH human recombinant 5 Unit(s) SubCutaneous before breakfast  isosorbide   dinitrate Tablet (ISORDIL) 10 milliGRAM(s) Oral three times a day  metoprolol succinate ER 50 milliGRAM(s) Oral daily  Nephro-raphael 1 Tablet(s) Oral daily  pantoprazole    Tablet 40 milliGRAM(s) Oral before breakfast  predniSONE   Tablet 40 milliGRAM(s) Oral daily  sevelamer carbonate 800 milliGRAM(s) Oral three times a day with meals  sodium chloride 0.9%. 500 milliLiter(s) (40 mL/Hr) IV Continuous <Continuous>      Assessment:  -SOB  -pAT  -HCAP - recent admission for viral URI/RVP +  -elevated but stable cardiac enzymes (hs trop) with recent admission with relatively preserved ck/ck mb fraction and no obvious ischemic changes on ekg  -chest pain with mild ekg changes and stable hs trop  -NSVT  -pAT  -volume overload  -ischemic cardiomyopathy, cad s/p multiple stents  -esrd on hd  -PAD s/p prior intervention         Recs:  -on abx for HCAP per ID  -known extensive CAD and ICM. s/p [facility] 2/20/2019 --> severe and diffuse instent restenosis along RCA --> unable to stent due to multiple layers of stenting and prior brachytherapy  -will consider complex intervention if true ischemic and refractory sx develop   -c/w beta blockers for nsvt/pat/cad (as above). currently on toprol 50mg, isordil 10mg tid, hydral 25mg tid. uptitrate GDMT as tolerates. wouldnt inc bb at this time 2/2 bronchospasm  -hx of prolonged qtc. avoid qtc prolonging meds  -s/p TTE 2019: mod-severe MR, pseudo AS (low flow-low gradient), mod-severe LV dysfunction --> recent CTS consult with dr anup donaldson. plan is for medical management given surgical risk and patient preference  -c/w asa, plavix, statin for ischemic cardiomyopathy/CAD/PAD  -dvt ppx        Over 25 minutes spent on total encounter; more than 50% of the visit was spent counseling and/or coordinating care by the attending physician.      Julián Biswas MD   Cardiovascular Disease  [phone]

## 2019-09-25 NOTE — PROGRESS NOTE ADULT - ASSESSMENT
· Assessment		  63 yo F with ESRD (on HD M/Tu/Th/Sa), type 1 DM w/DKA in past, CAD, HFrEF (EF 50% on TTE from 10/18), TIA, and PVD, very recent admission here until yesterday for COPD exacerbation felt 2/2 viral illness from enterovirus now presents with worsening dyspnea and hypoxemia down to 80s (O2 sat 82% on intial ED evaluation per ED provider note).       Pneumonia of left lower lobe due to infectious organism.   - Concern for HCAP given multiple recent hospitalizations in setting of known viral URI.  Vancomycin by level  Zosyn 3.375g q12h  O2 supplemental PRN.  ID follow  Pulmonary follow     Chronic obstructive pulmonary disease, unspecified COPD type.   Duonebs q6h ATC  budesonide nebulizers q12h    EKG abnormalities.   Findings of lateral TWI on EKG. May be related to hypoxemia exacerbating underlying CAD. Pt with chronically elevated troponin T.  Cardiology follow Dr. 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   Lantus 9 units qhs  Humalog 3 units TID AC  ISS.   Endocrine follow noted  MICU evaluation noted    Chronic congestive heart failure, unspecified heart failure type.  Appears euvolemic at this time  Volume management via HD while inpatient, did receive HD already today  renal consult for HD.     Coronary artery disease involving native coronary artery of native heart without angina pectoris.   atorvastatin  Aspirin  Plavix    ESRD  HD  Nephrology follow Dr. Brayan Viera MD pager [phone]

## 2019-09-25 NOTE — PROGRESS NOTE ADULT - SUBJECTIVE AND OBJECTIVE BOX
DIABETES FOLLOW UP NOTE: Saw pt earlier today  INTERVAL HX: 63y/o M w/h/o uncontrolled TIDM (HbA1c 8.1% 9/19) c/b neuropathy and retinopathy as well as CAD s/p multiple stents, CHF (EF 50% 10/2018), TIA, PVD s/p b/l fem-pop bypass, ESRD> HD. Pt was discharged last week after having COPD exacerbation felt 2/2 viral illness from enterovirus now presents with worsening dyspnea and hypoxemia. Pt now on steroid therapy with steroid induced glucotoxicity yesterday. Pt refused MICU transfer for glycemic control on insulin drip. BG levels improved overnight after getting a total of 47 units of Humalog plus 13 units of Lantus yesterday. BG in 200s today while on present insulin doses. Pt started to eat this am. Going to HD today. No hypoglycemia. Spoke to staff to avoid holding insulin since pt is on high steroid dose. Received NPH dose around 10am this am. Report still coughing but feeling better.       Review of Systems:  General: C/o on and off cough.  Cardiovascular: No chest pain, palpitations  Respiratory: denies SOB at time of visit but reports having it yesterday afternoon when it got hot. + cough  GI: No nausea, vomiting, abdominal pain  Endocrine: no polyuria, polydipsia or S&Sx of hypoglycemia    Allergies    No Known Allergies    Intolerances      MEDICATIONS:  atorvastatin 40 milliGRAM(s) Oral at bedtime  cefepime   IVPB 1000 milliGRAM(s) IV Intermittent every 24 hours  predniSONE   Tablet 40 milliGRAM(s) Oral daily  insulin glargine Injectable (LANTUS) 9 Unit(s) SubCutaneous at bedtime  insulin lispro (HumaLOG) corrective regimen sliding scale   SubCutaneous at bedtime  insulin lispro (HumaLOG) corrective regimen sliding scale   SubCutaneous three times a day before meals  insulin lispro Injectable (HumaLOG) 8 Unit(s) SubCutaneous three times a day before meals  insulin NPH human recombinant 5 Unit(s) SubCutaneous before breakfast      PHYSICAL EXAM:  Vital Signs Last 24 Hrs  T(C): 36.4 (09-25-19 @ 14:00), Max: 37 (09-25-19 @ 00:13)  T(F): 97.6 (09-25-19 @ 14:00), Max: 98.6 (09-25-19 @ 00:13)  HR: 81 (09-25-19 @ 14:00) (72 - 86)  BP: 118/42 (09-25-19 @ 14:00) (105/56 - 152/74)  BP(mean): --  RR: 18 (09-25-19 @ 14:00) (17 - 18)  SpO2: 95% (09-25-19 @ 14:00) (95% - 100%)  GENERAL: Female sitting in chair in NAD  Abdomen: Soft, nontender, non distended  Extremities: Warm, trace edema in LEs. Improved since last admission  NEURO: A&O X3    LABS:  POCT Blood Glucose.: 243 mg/dL (09-25-19 @ 13:48)  POCT Blood Glucose.: 212 mg/dL (09-25-19 @ 11:34)  POCT Blood Glucose.: 171 mg/dL (09-25-19 @ 08:29)  POCT Blood Glucose.: 206 mg/dL (09-25-19 @ 06:04)  POCT Blood Glucose.: 114 mg/dL (09-25-19 @ 02:06)  POCT Blood Glucose.: 260 mg/dL (09-24-19 @ 22:45)  POCT Blood Glucose.: 449 mg/dL (09-24-19 @ 20:08)  POCT Blood Glucose.: 518 mg/dL (09-24-19 @ 16:23)  POCT Blood Glucose.: 590 mg/dL (09-24-19 @ 14:58)  POCT Blood Glucose.: >600 mg/dL (09-24-19 @ 11:35)  POCT Blood Glucose.: >600 mg/dL (09-24-19 @ 11:31)  POCT Blood Glucose.: 580 mg/dL (09-24-19 @ 07:44)  POCT Blood Glucose.: 561 mg/dL (09-24-19 @ 07:43)  POCT Blood Glucose.: 354 mg/dL (09-24-19 @ 02:01)  POCT Blood Glucose.: 199 mg/dL (09-23-19 @ 21:24)  POCT Blood Glucose.: 137 mg/dL (09-23-19 @ 19:54)  POCT Blood Glucose.: 134 mg/dL (09-23-19 @ 18:11)                          9.5    7.4   )-----------( 225      ( 25 Sep 2019 06:22 )             30.1       09-25    134<L>  |  91<L>  |  30<H>  ----------------------------<  189<H>  3.5   |  23  |  5.08<H>    Ca    9.6      25 Sep 2019 06:22    TPro  7.9  /  Alb  4.4  /  TBili  0.3  /  DBili  x   /  AST  19  /  ALT  19  /  AlkPhos  155<H>  09-21    Hemoglobin A1C, Whole Blood: 8.1 % <H> [4.0 - 5.6] (09-16-19 @ 08:04)

## 2019-09-25 NOTE — PROGRESS NOTE ADULT - SUBJECTIVE AND OBJECTIVE BOX
[facility]   [phone]  DIALYSIS NOTE  Chief Complaint: ESRD/Ongoing hemodialysis requirement. seen on hd    24 hour events/subjective:    + cough + sob         ALLERGIES & MEDICATIONS  --------------------------------------------------------------------------------  Allergies    No Known Allergies    Intolerances      Standing Inpatient Medications  ALBUTerol/ipratropium for Nebulization 3 milliLiter(s) Nebulizer every 6 hours  aspirin enteric coated 81 milliGRAM(s) Oral daily  atorvastatin 40 milliGRAM(s) Oral at bedtime  buDESOnide    Inhalation Suspension 0.5 milliGRAM(s) Inhalation two times a day  cefepime   IVPB 1000 milliGRAM(s) IV Intermittent every 24 hours  chlorhexidine 2% Cloths 1 Application(s) Topical <User Schedule>  clopidogrel Tablet 75 milliGRAM(s) Oral daily  dextrose 5%. 1000 milliLiter(s) IV Continuous <Continuous>  dextrose 50% Injectable 12.5 Gram(s) IV Push once  dextrose 50% Injectable 25 Gram(s) IV Push once  dextrose 50% Injectable 25 Gram(s) IV Push once  docusate sodium 100 milliGRAM(s) Oral three times a day  heparin  Injectable 5000 Unit(s) SubCutaneous two times a day  hydrALAZINE 25 milliGRAM(s) Oral three times a day  insulin glargine Injectable (LANTUS) 9 Unit(s) SubCutaneous at bedtime  insulin lispro (HumaLOG) corrective regimen sliding scale   SubCutaneous at bedtime  insulin lispro (HumaLOG) corrective regimen sliding scale   SubCutaneous three times a day before meals  insulin lispro Injectable (HumaLOG) 8 Unit(s) SubCutaneous three times a day before meals  insulin NPH human recombinant 5 Unit(s) SubCutaneous before breakfast  isosorbide   dinitrate Tablet (ISORDIL) 10 milliGRAM(s) Oral three times a day  metoprolol succinate ER 50 milliGRAM(s) Oral daily  Nephro-raphael 1 Tablet(s) Oral daily  pantoprazole    Tablet 40 milliGRAM(s) Oral before breakfast  predniSONE   Tablet 40 milliGRAM(s) Oral daily  sevelamer carbonate 800 milliGRAM(s) Oral three times a day with meals  sodium chloride 0.9%. 500 milliLiter(s) IV Continuous <Continuous>    PRN Inpatient Medications  dextrose 40% Gel 15 Gram(s) Oral once PRN  glucagon  Injectable 1 milliGRAM(s) IntraMuscular once PRN  oxyCODONE    5 mG/acetaminophen 325 mG 1 Tablet(s) Oral every 6 hours PRN  senna 2 Tablet(s) Oral at bedtime PRN      REVIEW OF SYSTEMS  --------------------------------------------------------------------------------  no itching or rash  no fever or chill  no cp or palp   +  sob +  cough   no N/V/D/ no abd pain   ext no edema         VITALS/PHYSICAL EXAM  --------------------------------------------------------------------------------  T(C): 36.9 (09-25-19 @ 20:51), Max: 37 (09-25-19 @ 00:13)  HR: 88 (09-25-19 @ 20:51) (72 - 88)  BP: 100/62 (09-25-19 @ 20:51) (100/62 - 129/66)  RR: 18 (09-25-19 @ 20:51) (17 - 18)  SpO2: 96% (09-25-19 @ 20:51) (95% - 99%)  Wt(kg): --        09-24-19 @ 07:01  -  09-25-19 @ 07:00  --------------------------------------------------------  IN: 640 mL / OUT: 0 mL / NET: 640 mL    09-25-19 @ 07:01  -  09-25-19 @ 21:46  --------------------------------------------------------  IN: 465 mL / OUT: 1500 mL / NET: -1035 mL      Physical Exam:  		  Gen: chronically  ill appearing   	+ JVD  	Pulm: decrease bs coarse bs no  wheezing   	CV: RRR, S1S2; no rub  	Abd: +BS, soft, nontender/nondistended  	: No suprapubic tenderness  	UE: Warm, no cyanosis  no clubbing,  no edema  	LE: Warm, no cyanosis  no clubbing, 2+ pitting LLE > RLE  edema 1+   	Neuro: alert and oriented   	Vascular access: + avf + bruit and thrill 	  		    LABS/STUDIES  --------------------------------------------------------------------------------              9.5    7.4   >-----------<  225      [09-25-19 @ 06:22]              30.1     134  |  91  |  30  ----------------------------<  189      [09-25-19 @ 06:22]  3.5   |  23  |  5.08        Ca     9.6     [09-25-19 @ 06:22]                    imp/suggest: ESRD      Hemodialysis Prescription:  	Access:  	Dialyzer: revaclear   	Blood Flow (mL/Min): 400  	Dialysate Flow (mL/Min): 600  	Target UF (Liters):  	Treatment Time:  	Potassium:   	Calcium: 2.5  	  YOLANDA    Vitamin D     continue with hd   see hd flow sheet

## 2019-09-25 NOTE — PROGRESS NOTE ADULT - ASSESSMENT
63 y/o F w/ PMHx ESRD (HD M/T/T/Sat), IDDM, CHF, CAD, ischemic cardiomyopathy presents to the ED BIBA for fever. pt also in DKA, hyperglycemia and now with hyperkalemia as well as DKA    1- esrd  2- hyperkalemia resolved   3- DKA hx   4- HTN   5- chf      hd  f 160 3.5 hr 2 liter 3 k bfr 400 dfr 600  epogen [id number] U ivp   see hd flow sheet

## 2019-09-25 NOTE — CHART NOTE - NSCHARTNOTEFT_GEN_A_CORE
NATHAN MEEKS    Notified by RN patient with critical lab result ABG: Blood Gas Arterial, Lactate (09.25.19 @ 03:19)    Blood Gas Arterial, Lactate: 1.7 mmol/L    Blood Gas Profile - Arterial (09.25.19 @ 03:19)    pH, Arterial: 7.39    pCO2, Arterial: 47 mmHg    pO2, Arterial: 37 mmHg    HCO3, Arterial: 28 mmoL/L    Base Excess, Arterial: 2.6 mmol/L    Oxygen Saturation, Arterial: 64 %    Total CO2, Arterial: 29 mmoL/L    FIO2, Arterial: 32    Basic Metabolic Panel - STAT (09.25.19 @ 01:44)    Sodium, Serum: 134 mmol/L    Potassium, Serum: 3.5 mmol/L    Chloride, Serum: 89 mmol/L    Carbon Dioxide, Serum: 24 mmol/L    Anion Gap, Serum: 21 mmol/L    Blood Urea Nitrogen, Serum: 29 mg/dL    Creatinine, Serum: 4.77 mg/dL    Glucose, Serum: 116 mg/dL    Calcium, Total Serum: 10.6 mg/dL     Beta Hydroxy-Butyrate (09.25.19 @ 01:44)    Beta Hydroxy-Butyrate: 0.0 mmol/L    Pt resting comfortably, in NAD  VSS    Based on results of arterial blood gas, sample was likely venous  Lactate level is now normal; will not continue IVF at this time  BHB is 0.0    PLAN:  >Continue to monitor  >Glucose control per current orders  >F/U with Endocrinology in AM  >Endorse to day team; follow up per Attending

## 2019-09-25 NOTE — PROGRESS NOTE ADULT - SUBJECTIVE AND OBJECTIVE BOX
Patient is a 64y old  Female who presents with a chief complaint of Hypoxemia 2/2 healthcare associated pneumonia (25 Sep 2019 11:07)      SUBJECTIVE / OVERNIGHT EVENTS: No new complaints.   Review of Systems  chest pain no  palpitations no  sob no  nausea no  headache no    MEDICATIONS  (STANDING):  ALBUTerol/ipratropium for Nebulization 3 milliLiter(s) Nebulizer every 6 hours  aspirin enteric coated 81 milliGRAM(s) Oral daily  atorvastatin 40 milliGRAM(s) Oral at bedtime  buDESOnide    Inhalation Suspension 0.5 milliGRAM(s) Inhalation two times a day  cefepime   IVPB 1000 milliGRAM(s) IV Intermittent every 24 hours  chlorhexidine 2% Cloths 1 Application(s) Topical <User Schedule>  clopidogrel Tablet 75 milliGRAM(s) Oral daily  dextrose 5%. 1000 milliLiter(s) (50 mL/Hr) IV Continuous <Continuous>  dextrose 50% Injectable 12.5 Gram(s) IV Push once  dextrose 50% Injectable 25 Gram(s) IV Push once  dextrose 50% Injectable 25 Gram(s) IV Push once  docusate sodium 100 milliGRAM(s) Oral three times a day  heparin  Injectable 5000 Unit(s) SubCutaneous two times a day  hydrALAZINE 25 milliGRAM(s) Oral three times a day  insulin glargine Injectable (LANTUS) 9 Unit(s) SubCutaneous at bedtime  insulin lispro (HumaLOG) corrective regimen sliding scale   SubCutaneous at bedtime  insulin lispro (HumaLOG) corrective regimen sliding scale   SubCutaneous three times a day before meals  insulin lispro Injectable (HumaLOG) 8 Unit(s) SubCutaneous three times a day before meals  insulin lispro Injectable (HumaLOG) 8 Unit(s) SubCutaneous before lunch  insulin NPH human recombinant 5 Unit(s) SubCutaneous before breakfast  isosorbide   dinitrate Tablet (ISORDIL) 10 milliGRAM(s) Oral three times a day  metoprolol succinate ER 50 milliGRAM(s) Oral daily  Nephro-raphael 1 Tablet(s) Oral daily  pantoprazole    Tablet 40 milliGRAM(s) Oral before breakfast  predniSONE   Tablet 40 milliGRAM(s) Oral daily  sevelamer carbonate 800 milliGRAM(s) Oral three times a day with meals  sodium chloride 0.9%. 500 milliLiter(s) (40 mL/Hr) IV Continuous <Continuous>    MEDICATIONS  (PRN):  dextrose 40% Gel 15 Gram(s) Oral once PRN Blood Glucose LESS THAN 70 milliGRAM(s)/deciliter  glucagon  Injectable 1 milliGRAM(s) IntraMuscular once PRN Glucose LESS THAN 70 milligrams/deciliter  oxyCODONE    5 mG/acetaminophen 325 mG 1 Tablet(s) Oral every 6 hours PRN Severe Pain (7 - 10)  senna 2 Tablet(s) Oral at bedtime PRN Constipation      Vital Signs Last 24 Hrs  T(C): 36.4 (25 Sep 2019 14:00), Max: 37 (25 Sep 2019 00:13)  T(F): 97.6 (25 Sep 2019 14:00), Max: 98.6 (25 Sep 2019 00:13)  HR: 81 (25 Sep 2019 14:00) (72 - 86)  BP: 118/42 (25 Sep 2019 14:00) (105/56 - 152/74)  BP(mean): --  RR: 18 (25 Sep 2019 14:00) (17 - 18)  SpO2: 95% (25 Sep 2019 14:00) (95% - 100%)    PHYSICAL EXAM:  GENERAL: NAD  HEAD:  Atraumatic, Normocephalic  EYES: EOMI, PERRLA, conjunctiva and sclera clear  NECK: Supple, No JVD  CHEST/LUNG: few rhonchi to auscultation bilaterally; No wheeze  HEART: Regular rate and rhythm; No murmurs, rubs, or gallops  ABDOMEN: Soft, Nontender, Nondistended; Bowel sounds present  EXTREMITIES:  2+ Peripheral Pulses, No clubbing, cyanosis, or edema  PSYCH: Anxious  NEUROLOGY: non-focal  SKIN: No rashes or lesions    LABS:                        9.5    7.4   )-----------( 225      ( 25 Sep 2019 06:22 )             30.1     09-25    134<L>  |  91<L>  |  30<H>  ----------------------------<  189<H>  3.5   |  23  |  5.08<H>    Ca    9.6      25 Sep 2019 06:22                  RADIOLOGY & ADDITIONAL TESTS:    Imaging Personally Reviewed:    Consultant(s) Notes Reviewed:      Care Discussed with Consultants/Other Providers:

## 2019-09-25 NOTE — PROGRESS NOTE ADULT - ASSESSMENT
65 yo F hx DM, CAD, ESRD, CHF, MR, AS, COPD, PVD s/p b/l fem-pop, hilar adenopathy,  recently here with fever and viral URI.  Brief abx given, pt remained afebrile, resp status stable.    She returns with increasing SOB, weakness, chills, persistent cough.  Afebrile.  WBC slightly low with normal diff.  Trop elevated.    r/o post viral pneumonia vs continuum of same process (PCR still with same EV/RV), now with component of CHF.    CT findings noted; again, ?of new infection/pneumonia or lag    Plan:  will follow on Cefepime 1 g daily, but may limit abx to 5 days total  Check MRSA screen, still pending  Follow temps and CBC/diff   serial CXR  pulm f/u appreciated 63 yo F hx DM, CAD, ESRD, CHF, MR, AS, COPD, PVD s/p b/l fem-pop, hilar adenopathy,  recently here with fever and viral URI.  Brief abx given, pt remained afebrile, resp status stable.    She returns with increasing SOB, weakness, chills, persistent cough.  Afebrile.  WBC slightly low with normal diff.  Trop elevated.    r/o post viral pneumonia vs continuum of same process (PCR still with same EV/RV), now with component of CHF.    CT findings noted; again, ?of new infection/pneumonia or lag    Plan:  Adjust Cefepime back to 1 g daily in view of renal compromise, may limit abx to 5 days total  Check MRSA screen, reordered today  Follow temps and CBC/diff   serial CXR  pulm f/u appreciated

## 2019-09-25 NOTE — PROGRESS NOTE ADULT - SUBJECTIVE AND OBJECTIVE BOX
CC: f/u for possible pneumonia    Patient reports no fever, feels better, no cough    REVIEW OF SYSTEMS:  All other review of systems negative (Comprehensive ROS)    Antimicrobials Day #  :day 4  cefepime   IVPB 1000 milliGRAM(s) IV Intermittent every 8 hours    Other Medications Reviewed    Vital Signs Last 24 Hrs  T(C): 36.5 (25 Sep 2019 04:14), Max: 37 (25 Sep 2019 00:13)  T(F): 97.7 (25 Sep 2019 04:14), Max: 98.6 (25 Sep 2019 00:13)  HR: 72 (25 Sep 2019 06:24) (72 - 89)  BP: 109/63 (25 Sep 2019 06:24) (109/62 - 152/74)  BP(mean): --  RR: 18 (25 Sep 2019 06:24) (17 - 18)  SpO2: 97% (25 Sep 2019 06:24) (95% - 100%)    PHYSICAL EXAM:  General: alert, no acute distress  Eyes:  anicteric, no conjunctival injection, no discharge  Oropharynx: no lesions or injection 	  Neck: supple, without adenopathy  Lungs: coarse BS  Heart: regular rate and rhythm; +heraclio  Abdomen: soft, nondistended, nontender, without mass or organomegaly  Skin: no lesions  Extremities: no clubbing, cyanosis, or edema  Neurologic: alert, oriented, moves all extremities    LAB RESULTS:                                   9.5    7.4   )-----------( 225      ( 25 Sep 2019 06:22 )             30.1   09-25    134<L>  |  91<L>  |  30<H>  ----------------------------<  189<H>  3.5   |  23  |  5.08<H>    Ca    9.6      25 Sep 2019 06:22                MICROBIOLOGY:  RECENT CULTURES:    Culture - Blood (09.21.19 @ 19:23)    Specimen Source: .Blood    Culture Results:   No growth to date.            RADIOLOGY REVIEWED:    < from: CT Angio Chest w/ IV Cont (09.21.19 @ 18:27) >  1. No pulmonary embolism.  2. Branching "tree-in-bud" airspace opacities in bilateral lower lobes,   right middle lobe and lingula likely infectious in etiology.  3. Focal groundglass airspace opacities in bilateral upper lobes, not   significant changed dating back to at least a CT of the chest from   10/2/2017, which could represent groundglass nodules.    < end of copied text >

## 2019-09-25 NOTE — PROGRESS NOTE ADULT - PROBLEM SELECTOR PLAN 1
-Test BG ac meals/HS/2AM  -C/w Lantus 9 units q hs  -C/w NPH 5 units q am while on steroids  -Started Humalog 8 units ac meals  -Adjusted Humalog correction scale ac meals 3-13 units  -C/w moderate Humalog correction at hs and add same scale at 2am in case pt is hyperglycemic at that time.   -Please contact endo team with any changes on POS/steroids   -Plan discussed with pt/team.  Contact info: [phone] (24/7). pager [phone]

## 2019-09-26 LAB
ANION GAP SERPL CALC-SCNC: 16 MMOL/L — SIGNIFICANT CHANGE UP (ref 5–17)
ANION GAP SERPL CALC-SCNC: 19 MMOL/L — HIGH (ref 5–17)
B-OH-BUTYR SERPL-SCNC: <0.1 MMOL/L — SIGNIFICANT CHANGE UP
BUN SERPL-MCNC: 21 MG/DL — SIGNIFICANT CHANGE UP (ref 7–23)
BUN SERPL-MCNC: 31 MG/DL — HIGH (ref 7–23)
CALCIUM SERPL-MCNC: 10.2 MG/DL — SIGNIFICANT CHANGE UP (ref 8.4–10.5)
CALCIUM SERPL-MCNC: 9.2 MG/DL — SIGNIFICANT CHANGE UP (ref 8.4–10.5)
CHLORIDE SERPL-SCNC: 91 MMOL/L — LOW (ref 96–108)
CHLORIDE SERPL-SCNC: 94 MMOL/L — LOW (ref 96–108)
CO2 SERPL-SCNC: 22 MMOL/L — SIGNIFICANT CHANGE UP (ref 22–31)
CO2 SERPL-SCNC: 25 MMOL/L — SIGNIFICANT CHANGE UP (ref 22–31)
CREAT SERPL-MCNC: 3.62 MG/DL — HIGH (ref 0.5–1.3)
CREAT SERPL-MCNC: 4.69 MG/DL — HIGH (ref 0.5–1.3)
CULTURE RESULTS: SIGNIFICANT CHANGE UP
CULTURE RESULTS: SIGNIFICANT CHANGE UP
GLUCOSE BLDC GLUCOMTR-MCNC: 258 MG/DL — HIGH (ref 70–99)
GLUCOSE BLDC GLUCOMTR-MCNC: 269 MG/DL — HIGH (ref 70–99)
GLUCOSE BLDC GLUCOMTR-MCNC: 285 MG/DL — HIGH (ref 70–99)
GLUCOSE BLDC GLUCOMTR-MCNC: 516 MG/DL — CRITICAL HIGH (ref 70–99)
GLUCOSE BLDC GLUCOMTR-MCNC: 557 MG/DL — CRITICAL HIGH (ref 70–99)
GLUCOSE SERPL-MCNC: 308 MG/DL — HIGH (ref 70–99)
GLUCOSE SERPL-MCNC: 528 MG/DL — CRITICAL HIGH (ref 70–99)
HCT VFR BLD CALC: 32.1 % — LOW (ref 34.5–45)
HGB BLD-MCNC: 9.8 G/DL — LOW (ref 11.5–15.5)
MCHC RBC-ENTMCNC: 30.5 GM/DL — LOW (ref 32–36)
MCHC RBC-ENTMCNC: 33.3 PG — SIGNIFICANT CHANGE UP (ref 27–34)
MCV RBC AUTO: 109.2 FL — HIGH (ref 80–100)
PLATELET # BLD AUTO: 249 K/UL — SIGNIFICANT CHANGE UP (ref 150–400)
POTASSIUM SERPL-MCNC: 4.4 MMOL/L — SIGNIFICANT CHANGE UP (ref 3.5–5.3)
POTASSIUM SERPL-MCNC: 4.6 MMOL/L — SIGNIFICANT CHANGE UP (ref 3.5–5.3)
POTASSIUM SERPL-SCNC: 4.4 MMOL/L — SIGNIFICANT CHANGE UP (ref 3.5–5.3)
POTASSIUM SERPL-SCNC: 4.6 MMOL/L — SIGNIFICANT CHANGE UP (ref 3.5–5.3)
RBC # BLD: 2.94 M/UL — LOW (ref 3.8–5.2)
RBC # FLD: 14.3 % — SIGNIFICANT CHANGE UP (ref 10.3–14.5)
SODIUM SERPL-SCNC: 132 MMOL/L — LOW (ref 135–145)
SODIUM SERPL-SCNC: 135 MMOL/L — SIGNIFICANT CHANGE UP (ref 135–145)
SPECIMEN SOURCE: SIGNIFICANT CHANGE UP
SPECIMEN SOURCE: SIGNIFICANT CHANGE UP
WBC # BLD: 7.19 K/UL — SIGNIFICANT CHANGE UP (ref 3.8–10.5)
WBC # FLD AUTO: 7.19 K/UL — SIGNIFICANT CHANGE UP (ref 3.8–10.5)

## 2019-09-26 PROCEDURE — 93971 EXTREMITY STUDY: CPT | Mod: 26

## 2019-09-26 PROCEDURE — 93010 ELECTROCARDIOGRAM REPORT: CPT

## 2019-09-26 PROCEDURE — 99232 SBSQ HOSP IP/OBS MODERATE 35: CPT

## 2019-09-26 RX ORDER — INSULIN LISPRO 100/ML
10 VIAL (ML) SUBCUTANEOUS
Refills: 0 | Status: DISCONTINUED | OUTPATIENT
Start: 2019-09-26 | End: 2019-09-27

## 2019-09-26 RX ORDER — INSULIN LISPRO 100/ML
VIAL (ML) SUBCUTANEOUS
Refills: 0 | Status: DISCONTINUED | OUTPATIENT
Start: 2019-09-26 | End: 2019-09-27

## 2019-09-26 RX ORDER — INSULIN GLARGINE 100 [IU]/ML
11 INJECTION, SOLUTION SUBCUTANEOUS AT BEDTIME
Refills: 0 | Status: DISCONTINUED | OUTPATIENT
Start: 2019-09-26 | End: 2019-09-27

## 2019-09-26 RX ORDER — INSULIN LISPRO 100/ML
3 VIAL (ML) SUBCUTANEOUS AT BEDTIME
Refills: 0 | Status: DISCONTINUED | OUTPATIENT
Start: 2019-09-26 | End: 2019-09-30

## 2019-09-26 RX ADMIN — Medication 2: at 21:38

## 2019-09-26 RX ADMIN — ISOSORBIDE DINITRATE 10 MILLIGRAM(S): 5 TABLET ORAL at 07:41

## 2019-09-26 RX ADMIN — CHLORHEXIDINE GLUCONATE 1 APPLICATION(S): 213 SOLUTION TOPICAL at 09:14

## 2019-09-26 RX ADMIN — Medication 6: at 17:30

## 2019-09-26 RX ADMIN — OXYCODONE AND ACETAMINOPHEN 1 TABLET(S): 5; 325 TABLET ORAL at 18:26

## 2019-09-26 RX ADMIN — ISOSORBIDE DINITRATE 10 MILLIGRAM(S): 5 TABLET ORAL at 21:37

## 2019-09-26 RX ADMIN — Medication 100 MILLIGRAM(S): at 15:24

## 2019-09-26 RX ADMIN — PANTOPRAZOLE SODIUM 40 MILLIGRAM(S): 20 TABLET, DELAYED RELEASE ORAL at 06:09

## 2019-09-26 RX ADMIN — Medication 3 UNIT(S): at 21:38

## 2019-09-26 RX ADMIN — Medication 25 MILLIGRAM(S): at 15:24

## 2019-09-26 RX ADMIN — Medication 6: at 12:17

## 2019-09-26 RX ADMIN — Medication 3 MILLILITER(S): at 06:08

## 2019-09-26 RX ADMIN — Medication 3 MILLILITER(S): at 18:02

## 2019-09-26 RX ADMIN — ISOSORBIDE DINITRATE 10 MILLIGRAM(S): 5 TABLET ORAL at 15:55

## 2019-09-26 RX ADMIN — Medication 25 MILLIGRAM(S): at 21:38

## 2019-09-26 RX ADMIN — CEFEPIME 100 MILLIGRAM(S): 1 INJECTION, POWDER, FOR SOLUTION INTRAMUSCULAR; INTRAVENOUS at 12:40

## 2019-09-26 RX ADMIN — HUMAN INSULIN 5 UNIT(S): 100 INJECTION, SUSPENSION SUBCUTANEOUS at 08:19

## 2019-09-26 RX ADMIN — Medication 8 UNIT(S): at 07:55

## 2019-09-26 RX ADMIN — ATORVASTATIN CALCIUM 40 MILLIGRAM(S): 80 TABLET, FILM COATED ORAL at 21:37

## 2019-09-26 RX ADMIN — HEPARIN SODIUM 5000 UNIT(S): 5000 INJECTION INTRAVENOUS; SUBCUTANEOUS at 06:08

## 2019-09-26 RX ADMIN — Medication 40 MILLIGRAM(S): at 06:08

## 2019-09-26 RX ADMIN — CLOPIDOGREL BISULFATE 75 MILLIGRAM(S): 75 TABLET, FILM COATED ORAL at 12:40

## 2019-09-26 RX ADMIN — Medication 3 MILLILITER(S): at 12:19

## 2019-09-26 RX ADMIN — Medication 13: at 07:54

## 2019-09-26 RX ADMIN — INSULIN GLARGINE 11 UNIT(S): 100 INJECTION, SOLUTION SUBCUTANEOUS at 21:37

## 2019-09-26 RX ADMIN — Medication 10 UNIT(S): at 12:19

## 2019-09-26 RX ADMIN — Medication 1 TABLET(S): at 12:20

## 2019-09-26 RX ADMIN — Medication 0.5 MILLIGRAM(S): at 06:08

## 2019-09-26 RX ADMIN — SEVELAMER CARBONATE 800 MILLIGRAM(S): 2400 POWDER, FOR SUSPENSION ORAL at 18:02

## 2019-09-26 RX ADMIN — SEVELAMER CARBONATE 800 MILLIGRAM(S): 2400 POWDER, FOR SUSPENSION ORAL at 12:20

## 2019-09-26 RX ADMIN — Medication 10 UNIT(S): at 17:31

## 2019-09-26 RX ADMIN — OXYCODONE AND ACETAMINOPHEN 1 TABLET(S): 5; 325 TABLET ORAL at 17:22

## 2019-09-26 RX ADMIN — Medication 0.5 MILLIGRAM(S): at 18:02

## 2019-09-26 RX ADMIN — Medication 81 MILLIGRAM(S): at 12:40

## 2019-09-26 NOTE — PROGRESS NOTE ADULT - SUBJECTIVE AND OBJECTIVE BOX
Cardiovascular Disease Progress Note    Overnight events: No acute events overnight.  +sob/cough. no cp/palps/dizziness  Otherwise review of systems negative    Objective Findings:  T(C): 37 (19 @ 04:38), Max: 37 (19 @ 04:38)  HR: 80 (19 @ 06:06) (72 - 88)  BP: 119/74 (19 @ 06:06) (100/62 - 124/71)  RR: 18 (19 @ 04:38) (17 - 18)  SpO2: 97% (19 @ 04:38) (95% - 97%)  Wt(kg): --  Daily     Daily Weight in k.8 (25 Sep 2019 17:42)      Physical Exam:  Gen: NAD  HEENT: EOMI  CV: RRR, normal S1 + S2, no m/r/g  Lungs: course breath sounds  Abd: soft, non-tender  Ext: No edema    Telemetry:    Laboratory Data:                        9.5    7.4   )-----------( 225      ( 25 Sep 2019 06:22 )             30.1         134<L>  |  91<L>  |  30<H>  ----------------------------<  189<H>  3.5   |  23  |  5.08<H>    Ca    9.6      25 Sep 2019 06:22                Inpatient Medications:  MEDICATIONS  (STANDING):  ALBUTerol/ipratropium for Nebulization 3 milliLiter(s) Nebulizer every 6 hours  aspirin enteric coated 81 milliGRAM(s) Oral daily  atorvastatin 40 milliGRAM(s) Oral at bedtime  buDESOnide    Inhalation Suspension 0.5 milliGRAM(s) Inhalation two times a day  cefepime   IVPB 1000 milliGRAM(s) IV Intermittent every 24 hours  chlorhexidine 2% Cloths 1 Application(s) Topical <User Schedule>  clopidogrel Tablet 75 milliGRAM(s) Oral daily  dextrose 5%. 1000 milliLiter(s) (50 mL/Hr) IV Continuous <Continuous>  dextrose 50% Injectable 12.5 Gram(s) IV Push once  dextrose 50% Injectable 25 Gram(s) IV Push once  dextrose 50% Injectable 25 Gram(s) IV Push once  docusate sodium 100 milliGRAM(s) Oral three times a day  heparin  Injectable 5000 Unit(s) SubCutaneous two times a day  hydrALAZINE 25 milliGRAM(s) Oral three times a day  insulin glargine Injectable (LANTUS) 9 Unit(s) SubCutaneous at bedtime  insulin lispro (HumaLOG) corrective regimen sliding scale   SubCutaneous at bedtime  insulin lispro (HumaLOG) corrective regimen sliding scale   SubCutaneous three times a day before meals  insulin lispro Injectable (HumaLOG) 8 Unit(s) SubCutaneous three times a day before meals  insulin NPH human recombinant 5 Unit(s) SubCutaneous before breakfast  isosorbide   dinitrate Tablet (ISORDIL) 10 milliGRAM(s) Oral three times a day  metoprolol succinate ER 50 milliGRAM(s) Oral daily  Nephro-raphael 1 Tablet(s) Oral daily  pantoprazole    Tablet 40 milliGRAM(s) Oral before breakfast  predniSONE   Tablet 40 milliGRAM(s) Oral daily  sevelamer carbonate 800 milliGRAM(s) Oral three times a day with meals  sodium chloride 0.9%. 500 milliLiter(s) (40 mL/Hr) IV Continuous <Continuous>      Assessment:  -SOB  -pAT  -HCAP - recent admission for viral URI/RVP +  -elevated but stable cardiac enzymes (hs trop) with recent admission with relatively preserved ck/ck mb fraction and no obvious ischemic changes on ekg  -chest pain with mild ekg changes and stable hs trop  -NSVT  -pAT  -volume overload  -ischemic cardiomyopathy, cad s/p multiple stents  -esrd on hd  -PAD s/p prior intervention         Recs:  -abx for HCAP per ID  -f/u pulm  -known extensive CAD and ICM. s/p [facility] 2019 --> severe and diffuse instent restenosis along RCA --> unable to stent due to multiple layers of stenting and prior brachytherapy  -will consider complex intervention if true ischemic and refractory sx develop   -c/w beta blockers for nsvt/pat/cad (as above). currently on toprol 50mg, isordil 10mg tid, hydral 25mg tid. uptitrate GDMT as tolerates. wouldnt inc bb at this time 2/2 bronchospasm  -hx of prolonged qtc. avoid qtc prolonging meds  -s/p [facility]TE 2019: mod-severe MR, pseudo AS (low flow-low gradient), mod-severe LV dysfunction --> recent CTS consult with dr hebert appreciated. plan is for medical management given surgical risk and patient preference  -c/w asa, plavix, statin for ischemic cardiomyopathy/CAD/PAD  -dvt ppx        Over 25 minutes spent on total encounter; more than 50% of the visit was spent counseling and/or coordinating care by the attending physician.      Julián Biswas MD   Cardiovascular Disease  [phone]

## 2019-09-26 NOTE — PROVIDER CONTACT NOTE (OTHER) - ASSESSMENT
VSS, HR now 90 . Patient usually sinus 90/100 with PAC.
A&Ox4. VSS. No c/o distress.
Patient is A&Ox4, VSS. Resting in bed comfortably. No complaints, asymptomatic.
bp 106/64. Hr 100, spo2 89, 18 respirations,, 97.8 temp.
patient a&o x4, vss denies any c/o chest pain, no sob, asymptomatic
patient a&o x4, vss, denies any c/o at this time, no chest pain, no dizziness, no headache
patient is alert and orientated x4, able to make needs known. patient is asymptomatic. Denies any chest pain, palpitations/SOB. Vital signs stable /59, HR86 TEMP 97.9 RR 18  SPO2 93% on O2 2L nc.

## 2019-09-26 NOTE — PROGRESS NOTE ADULT - SUBJECTIVE AND OBJECTIVE BOX
[facility]   [phone]  RENAL FOLLOW UP NOTE  --------------------------------------------------------------------------------  Chief Complaint:    24 hour events/subjective:    seen earlier. states cough and sob is improving. has pain in feet.     PAST HISTORY  --------------------------------------------------------------------------------  No significant changes to PMH, PSH, FHx, SHx, unless otherwise noted    ALLERGIES & MEDICATIONS  --------------------------------------------------------------------------------  Allergies    No Known Allergies    Intolerances      Standing Inpatient Medications  ALBUTerol/ipratropium for Nebulization 3 milliLiter(s) Nebulizer every 6 hours  aspirin enteric coated 81 milliGRAM(s) Oral daily  atorvastatin 40 milliGRAM(s) Oral at bedtime  buDESOnide    Inhalation Suspension 0.5 milliGRAM(s) Inhalation two times a day  chlorhexidine 2% Cloths 1 Application(s) Topical <User Schedule>  clopidogrel Tablet 75 milliGRAM(s) Oral daily  dextrose 5%. 1000 milliLiter(s) IV Continuous <Continuous>  dextrose 50% Injectable 12.5 Gram(s) IV Push once  dextrose 50% Injectable 25 Gram(s) IV Push once  dextrose 50% Injectable 25 Gram(s) IV Push once  docusate sodium 100 milliGRAM(s) Oral three times a day  heparin  Injectable 5000 Unit(s) SubCutaneous two times a day  hydrALAZINE 25 milliGRAM(s) Oral three times a day  insulin glargine Injectable (LANTUS) 11 Unit(s) SubCutaneous at bedtime  insulin lispro (HumaLOG) corrective regimen sliding scale   SubCutaneous three times a day before meals  insulin lispro (HumaLOG) corrective regimen sliding scale   SubCutaneous <User Schedule>  insulin lispro Injectable (HumaLOG) 10 Unit(s) SubCutaneous three times a day before meals  insulin lispro Injectable (HumaLOG) 3 Unit(s) SubCutaneous at bedtime  insulin NPH human recombinant 5 Unit(s) SubCutaneous before breakfast  isosorbide   dinitrate Tablet (ISORDIL) 10 milliGRAM(s) Oral three times a day  metoprolol succinate ER 50 milliGRAM(s) Oral daily  Nephro-raphael 1 Tablet(s) Oral daily  pantoprazole    Tablet 40 milliGRAM(s) Oral before breakfast  predniSONE   Tablet 40 milliGRAM(s) Oral daily  sevelamer carbonate 800 milliGRAM(s) Oral three times a day with meals  sodium chloride 0.9%. 500 milliLiter(s) IV Continuous <Continuous>    PRN Inpatient Medications  dextrose 40% Gel 15 Gram(s) Oral once PRN  glucagon  Injectable 1 milliGRAM(s) IntraMuscular once PRN  oxyCODONE    5 mG/acetaminophen 325 mG 1 Tablet(s) Oral every 6 hours PRN  senna 2 Tablet(s) Oral at bedtime PRN      REVIEW OF SYSTEMS  --------------------------------------------------------------------------------    Gen: denies fevers/chills,  CVS: denies chest pain/palpitations  Resp: denies worsening SOB/Cough +   GI: Denies N/V/Abd pain  : Denies dysuria    All other systems were reviewed and are negative, except as noted.    VITALS/PHYSICAL EXAM  --------------------------------------------------------------------------------  T(C): 36.3 (09-26-19 @ 20:21), Max: 37 (09-26-19 @ 04:38)  HR: 95 (09-26-19 @ 20:21) (72 - 135)  BP: 137/78 (09-26-19 @ 20:21) (107/68 - 137/78)  RR: 18 (09-26-19 @ 20:21) (18 - 18)  SpO2: 95% (09-26-19 @ 20:21) (94% - 98%)  Wt(kg): --        09-25-19 @ 07:01  -  09-26-19 @ 07:00  --------------------------------------------------------  IN: 705 mL / OUT: 1500 mL / NET: -795 mL    09-26-19 @ 07:01  -  09-26-19 @ 21:54  --------------------------------------------------------  IN: 480 mL / OUT: 0 mL / NET: 480 mL      Physical Exam:  	    Gen: chronically  ill appearing   	no  JVD  	Pulm: decrease bs no rales  +   wheezing   	CV: RRR, S1S2; no rub  	Abd: +BS, soft, nontender/nondistended  	: No suprapubic tenderness  	UE: Warm, no cyanosis  no clubbing,  no edema  	LE: Warm, no cyanosis  no clubbing, 1+ pitting LLE > RLE  edema 1+   	Neuro: alert and oriented   	Vascular access: + avf + bruit and thrill 	  		      LABS/STUDIES  --------------------------------------------------------------------------------              9.8    7.19  >-----------<  249      [09-26-19 @ 08:37]              32.1     135  |  91  |  31  ----------------------------<  308      [09-26-19 @ 20:03]  4.6   |  25  |  4.69        Ca     10.2     [09-26-19 @ 20:03]            Creatinine Trend:  SCr 4.69 [09-26 @ 20:03]  SCr 3.62 [09-26 @ 07:24]  SCr 5.08 [09-25 @ 06:22]  SCr 4.77 [09-25 @ 01:44]  SCr 4.36 [09-24 @ 20:59]                  Iron 42, TIBC 253, %sat 17      [06-17-19 @ 17:54]  Ferritin 1838      [06-17-19 @ 18:06]  PTH -- (Ca 9.6)      [06-17-19 @ 18:06]   177  PTH -- (Ca 7.8)      [03-29-19 @ 20:38]   73  PTH -- (Ca 7.3)      [02-22-19 @ 02:49]   59  HbA1c 8.1      [09-16-19 @ 08:04]  TSH 0.71      [11-17-18 @ 08:25]

## 2019-09-26 NOTE — PROGRESS NOTE ADULT - SUBJECTIVE AND OBJECTIVE BOX
PULMONARY PROGRESS NOTE    NATHAN MEEKS  MRN-[id number]    Patient is a 64y old  Female who presents with a chief complaint of Hypoxemia 2/2 healthcare associated pneumonia (26 Sep 2019 09:50)      HPI:  on room air  + cough    ROS:   -    ACTIVE MEDICATION LIST:  MEDICATIONS  (STANDING):  ALBUTerol/ipratropium for Nebulization 3 milliLiter(s) Nebulizer every 6 hours  aspirin enteric coated 81 milliGRAM(s) Oral daily  atorvastatin 40 milliGRAM(s) Oral at bedtime  buDESOnide    Inhalation Suspension 0.5 milliGRAM(s) Inhalation two times a day  cefepime   IVPB 1000 milliGRAM(s) IV Intermittent every 24 hours  chlorhexidine 2% Cloths 1 Application(s) Topical <User Schedule>  clopidogrel Tablet 75 milliGRAM(s) Oral daily  dextrose 5%. 1000 milliLiter(s) (50 mL/Hr) IV Continuous <Continuous>  dextrose 50% Injectable 12.5 Gram(s) IV Push once  dextrose 50% Injectable 25 Gram(s) IV Push once  dextrose 50% Injectable 25 Gram(s) IV Push once  docusate sodium 100 milliGRAM(s) Oral three times a day  heparin  Injectable 5000 Unit(s) SubCutaneous two times a day  hydrALAZINE 25 milliGRAM(s) Oral three times a day  insulin glargine Injectable (LANTUS) 11 Unit(s) SubCutaneous at bedtime  insulin lispro (HumaLOG) corrective regimen sliding scale   SubCutaneous three times a day before meals  insulin lispro (HumaLOG) corrective regimen sliding scale   SubCutaneous <User Schedule>  insulin lispro Injectable (HumaLOG) 10 Unit(s) SubCutaneous three times a day before meals  insulin lispro Injectable (HumaLOG) 3 Unit(s) SubCutaneous at bedtime  insulin NPH human recombinant 5 Unit(s) SubCutaneous before breakfast  isosorbide   dinitrate Tablet (ISORDIL) 10 milliGRAM(s) Oral three times a day  metoprolol succinate ER 50 milliGRAM(s) Oral daily  Nephro-raphael 1 Tablet(s) Oral daily  pantoprazole    Tablet 40 milliGRAM(s) Oral before breakfast  predniSONE   Tablet 40 milliGRAM(s) Oral daily  sevelamer carbonate 800 milliGRAM(s) Oral three times a day with meals  sodium chloride 0.9%. 500 milliLiter(s) (40 mL/Hr) IV Continuous <Continuous>    MEDICATIONS  (PRN):  dextrose 40% Gel 15 Gram(s) Oral once PRN Blood Glucose LESS THAN 70 milliGRAM(s)/deciliter  glucagon  Injectable 1 milliGRAM(s) IntraMuscular once PRN Glucose LESS THAN 70 milligrams/deciliter  oxyCODONE    5 mG/acetaminophen 325 mG 1 Tablet(s) Oral every 6 hours PRN Severe Pain (7 - 10)  senna 2 Tablet(s) Oral at bedtime PRN Constipation      EXAM:  Vital Signs Last 24 Hrs  T(C): 37 (26 Sep 2019 11:40), Max: 37 (26 Sep 2019 04:38)  T(F): 98.6 (26 Sep 2019 11:40), Max: 98.6 (26 Sep 2019 04:38)  HR: 86 (26 Sep 2019 11:40) (72 - 88)  BP: 127/72 (26 Sep 2019 11:40) (100/62 - 127/72)  BP(mean): --  RR: 18 (26 Sep 2019 11:40) (17 - 18)  SpO2: 98% (26 Sep 2019 11:40) (95% - 98%)    GENERAL: The patient is awake and alert in no apparent distress.     LUNGS: Clear to auscultation without wheezing, rales or rhonchi; respirations unlabored    HEART: Regular rate and rhythm without murmur.                            9.8    7.19  )-----------( 249      ( 26 Sep 2019 08:37 )             32.1       09-26    132<L>  |  94<L>  |  21  ----------------------------<  528<HH>  4.4   |  22  |  3.62<H>    Ca    9.2      26 Sep 2019 07:24          PROBLEM LIST:  64y Female with HEALTH ISSUES - PROBLEM Dx:  Dyspnea: Dyspnea  ESRD (end stage renal disease): ESRD (end stage renal disease)  Coronary artery disease involving native coronary artery of native heart without angina pectoris: Coronary artery disease involving native coronary artery of native heart without angina pectoris  Chronic congestive heart failure, unspecified heart failure type: Chronic congestive heart failure, unspecified heart failure type  Type 1 diabetes mellitus with chronic kidney disease on chronic dialysis: Type 1 diabetes mellitus with chronic kidney disease on chronic dialysis  EKG abnormalities: EKG abnormalities  Chronic obstructive pulmonary disease, unspecified COPD type: Chronic obstructive pulmonary disease, unspecified COPD type  Pneumonia of left lower lobe due to infectious organism: Pneumonia of left lower lobe due to infectious organism            RECS:  supportive care for viral infection   neb Q6hrs change to PRN  continue budesonide BID  prednisone 40mg X 5 days (total)- tomorrow is last dose  appreciate cardio/ID input  outpatient f/u with repeat CT chest for the GGO/ lymphadenopathy - with Dr Thompson      Please call with any questions.    Chelle Kapoor DO  [facility] Pulmonary/Sleep Medicine  [phone] PULMONARY PROGRESS NOTE    NATHAN MEEKS  MRN-[id number]    Patient is a 64y old  Female who presents with a chief complaint of Hypoxemia 2/2 healthcare associated pneumonia (26 Sep 2019 09:50)      HPI:  on room air  + cough    ROS:   -    ACTIVE MEDICATION LIST:  MEDICATIONS  (STANDING):  ALBUTerol/ipratropium for Nebulization 3 milliLiter(s) Nebulizer every 6 hours  aspirin enteric coated 81 milliGRAM(s) Oral daily  atorvastatin 40 milliGRAM(s) Oral at bedtime  buDESOnide    Inhalation Suspension 0.5 milliGRAM(s) Inhalation two times a day  cefepime   IVPB 1000 milliGRAM(s) IV Intermittent every 24 hours  chlorhexidine 2% Cloths 1 Application(s) Topical <User Schedule>  clopidogrel Tablet 75 milliGRAM(s) Oral daily  dextrose 5%. 1000 milliLiter(s) (50 mL/Hr) IV Continuous <Continuous>  dextrose 50% Injectable 12.5 Gram(s) IV Push once  dextrose 50% Injectable 25 Gram(s) IV Push once  dextrose 50% Injectable 25 Gram(s) IV Push once  docusate sodium 100 milliGRAM(s) Oral three times a day  heparin  Injectable 5000 Unit(s) SubCutaneous two times a day  hydrALAZINE 25 milliGRAM(s) Oral three times a day  insulin glargine Injectable (LANTUS) 11 Unit(s) SubCutaneous at bedtime  insulin lispro (HumaLOG) corrective regimen sliding scale   SubCutaneous three times a day before meals  insulin lispro (HumaLOG) corrective regimen sliding scale   SubCutaneous <User Schedule>  insulin lispro Injectable (HumaLOG) 10 Unit(s) SubCutaneous three times a day before meals  insulin lispro Injectable (HumaLOG) 3 Unit(s) SubCutaneous at bedtime  insulin NPH human recombinant 5 Unit(s) SubCutaneous before breakfast  isosorbide   dinitrate Tablet (ISORDIL) 10 milliGRAM(s) Oral three times a day  metoprolol succinate ER 50 milliGRAM(s) Oral daily  Nephro-raphael 1 Tablet(s) Oral daily  pantoprazole    Tablet 40 milliGRAM(s) Oral before breakfast  predniSONE   Tablet 40 milliGRAM(s) Oral daily  sevelamer carbonate 800 milliGRAM(s) Oral three times a day with meals  sodium chloride 0.9%. 500 milliLiter(s) (40 mL/Hr) IV Continuous <Continuous>    MEDICATIONS  (PRN):  dextrose 40% Gel 15 Gram(s) Oral once PRN Blood Glucose LESS THAN 70 milliGRAM(s)/deciliter  glucagon  Injectable 1 milliGRAM(s) IntraMuscular once PRN Glucose LESS THAN 70 milligrams/deciliter  oxyCODONE    5 mG/acetaminophen 325 mG 1 Tablet(s) Oral every 6 hours PRN Severe Pain (7 - 10)  senna 2 Tablet(s) Oral at bedtime PRN Constipation      EXAM:  Vital Signs Last 24 Hrs  T(C): 37 (26 Sep 2019 11:40), Max: 37 (26 Sep 2019 04:38)  T(F): 98.6 (26 Sep 2019 11:40), Max: 98.6 (26 Sep 2019 04:38)  HR: 86 (26 Sep 2019 11:40) (72 - 88)  BP: 127/72 (26 Sep 2019 11:40) (100/62 - 127/72)  BP(mean): --  RR: 18 (26 Sep 2019 11:40) (17 - 18)  SpO2: 98% (26 Sep 2019 11:40) (95% - 98%)    GENERAL: The patient is awake and alert in no apparent distress.     LUNGS: Clear to auscultation without wheezing, rales or rhonchi; respirations unlabored    HEART: Regular rate and rhythm without murmur.                            9.8    7.19  )-----------( 249      ( 26 Sep 2019 08:37 )             32.1       09-26    132<L>  |  94<L>  |  21  ----------------------------<  528<HH>  4.4   |  22  |  3.62<H>    Ca    9.2      26 Sep 2019 07:24    < from: Xray Chest 1 View- PORTABLE-Routine (09.24.19 @ 14:42) >    EXAM:  XR CHEST PORTABLE ROUTINE 1V                            PROCEDURE DATE:  09/24/2019            INTERPRETATION:  CLINICAL INFORMATION: Shortness of breath, COPD.    EXAM: Frontal radiograph of the chest.    COMPARISON: Chest radiograph from 9/14/2019    FINDINGS:  The heart size is normal.    The lungs are clear. No pneumothorax or pleural effusions.    Vascular stent in the left upper thorax.    The visualized osseous structures are unremarkable.    IMPRESSION: Clear lungs.                RICK FAIRCHILD M.D., RADIOLOGY RESIDENT  This document has been electronically signed.  DOROTHEA BISHOP M.D., ATTENDING RADIOLOGIST  This document has been electronically signed. Sep 25 2019 11:50AM              < end of copied text >        PROBLEM LIST:  64y Female with HEALTH ISSUES - PROBLEM Dx:  Dyspnea: Dyspnea  ESRD (end stage renal disease): ESRD (end stage renal disease)  Coronary artery disease involving native coronary artery of native heart without angina pectoris: Coronary artery disease involving native coronary artery of native heart without angina pectoris  Chronic congestive heart failure, unspecified heart failure type: Chronic congestive heart failure, unspecified heart failure type  Type 1 diabetes mellitus with chronic kidney disease on chronic dialysis: Type 1 diabetes mellitus with chronic kidney disease on chronic dialysis  EKG abnormalities: EKG abnormalities  Chronic obstructive pulmonary disease, unspecified COPD type: Chronic obstructive pulmonary disease, unspecified COPD type  Pneumonia of left lower lobe due to infectious organism: Pneumonia of left lower lobe due to infectious organism            RECS:  supportive care for viral infection   neb Q6hrs change to PRN  continue budesonide BID  prednisone 40mg X 5 days (total)- tomorrow is last dose  appreciate cardio/ID input  outpatient f/u with repeat CT chest for the GGO/ lymphadenopathy - with Dr Thompson      Please call with any questions.    Chelle Kapoor, DO  [facility] Pulmonary/Sleep Medicine  [phone]

## 2019-09-26 NOTE — PROVIDER CONTACT NOTE (CRITICAL VALUE NOTIFICATION) - PERSON GIVING RESULT:
Arthur Basilio- Kwan
Blood Glucose 531
Emma Viera
Joey Godinez
Lab Rip Camp
Minoo Ferrer
Peter Patel
erik Rucker

## 2019-09-26 NOTE — CHART NOTE - NSCHARTNOTEFT_GEN_A_CORE
Medicine PA Note   NATHAN MEEKS  MRN-[id number]  Allergies: No Known Allergies        Called to evaluate patient for elevated finger stick of 529.  Patient sitting up in bed in NAD enjoying breakfast, no current complaints.  Reports that she has been coughing overnight, but her breathing feels okay with the NC O2.   Denies any current HA, SOB, CP, nausea, vomiting, dizziness.    Discussed blood glucose with endocrine. Will follow endo recs and continue to monitor patient.       Niki Willingham PA-C (Medicine PA) Medicine PA Note   NATHAN MEEKS  MRN-[id number]  Allergies: No Known Allergies        Called to evaluate patient for elevated finger stick of 557, now 516.  Patient sitting up in bed in NAD enjoying breakfast, no current complaints.  Reports that she has been coughing overnight, but her breathing feels okay with the NC O2.   Denies any current HA, SOB, CP, nausea, vomiting, dizziness.    Discussed blood glucose with endocrine. Will follow endo recs and continue to monitor patient.       Niki Willingham PA-C (Medicine PA)

## 2019-09-26 NOTE — PROGRESS NOTE ADULT - PROBLEM SELECTOR PLAN 1
-test BG AC/HS/2AM  -Increase Lantus 11 units QHS  -c/w NPH 5 units QAM while on steroids  -Increase Humalog 10 units AC meals  -Add Humalog 3 units w/bedtime snack  -Change Humalog to moderate correction scale AC. C/w Moderate HS scale and add 2AM scale to correct >250mg/dl  -If Glucose remains >300mg/dl and BMP shows evidence of acidosis, would send off bmp, vbg and bhb to r/o DKA.   -May benefit from IV fluids if glucose remains high as well.   Please contact endo team with any changes on POS/steroids   -Plan discussed with pt/team.  pager: [phone]

## 2019-09-26 NOTE — CHART NOTE - NSCHARTNOTESELECT_GEN_ALL_CORE
Event Note Bactrim Pregnancy And Lactation Text: This medication is Pregnancy Category D and is known to cause fetal risk.  It is also excreted in breast milk.

## 2019-09-26 NOTE — PROGRESS NOTE ADULT - ASSESSMENT
63y/o M w/h/o uncontrolled TIDM (HbA1c 8.1% 9/19) c/b neuropathy and retinopathy as well as CAD s/p multiple stents, CHF (EF 50% 10/2018), TIA, PVD s/p b/l fem-pop bypass, ESRD> HD. Recent admission with COPD exacerbation felt 2/2 viral illness from enterovirus now presents with worsening dyspnea and hypoxemia. Now treated for PNA > on antibiotic > on steroids. Pt w/severe hyperglycemia this AM in setting of steroids and suspected overnight snacking. Pt not tested or corrected for glucose overnight so will re-add 2AM scale. BG goal (100-low 200s while on steroids). BMP pending although don't suspect DKA given that pt received basal insulin last night. Will adjust regimen.

## 2019-09-26 NOTE — PROGRESS NOTE ADULT - SUBJECTIVE AND OBJECTIVE BOX
CC: f/u for possible pneumonia    Patient reports no fever, feels better, no cough    REVIEW OF SYSTEMS:  All other review of systems negative (Comprehensive ROS)    Antimicrobials Day #  :day 5  cefepime   IVPB 1000 milliGRAM(s) IV Intermittent every 8 hours    Other Medications Reviewed    Vital Signs Last 24 Hrs  T(C): 36.6 (26 Sep 2019 15:18), Max: 37 (26 Sep 2019 04:38)  T(F): 97.8 (26 Sep 2019 15:18), Max: 98.6 (26 Sep 2019 04:38)  HR: 92 (26 Sep 2019 15:18) (72 - 92)  BP: 133/69 (26 Sep 2019 15:18) (100/62 - 133/69)  BP(mean): --  RR: 18 (26 Sep 2019 15:18) (17 - 18)  SpO2: 94% (26 Sep 2019 15:18) (94% - 98%)  PHYSICAL EXAM:  General: alert, no acute distress  Eyes:  anicteric, no conjunctival injection, no discharge  Oropharynx: no lesions or injection 	  Neck: supple, without adenopathy  Lungs: coarse BS  Heart: regular rate and rhythm; +heraclio  Abdomen: soft, nondistended, nontender, without mass or organomegaly  Skin: no lesions  Extremities: no clubbing, cyanosis, or edema  Neurologic: alert, oriented, moves all extremities    LAB RESULTS:                                              9.8    7.19  )-----------( 249      ( 26 Sep 2019 08:37 )             32.1   09-26    132<L>  |  94<L>  |  21  ----------------------------<  528<HH>  4.4   |  22  |  3.62<H>    Ca    9.2      26 Sep 2019 07:24                  MICROBIOLOGY:  RECENT CULTURES:    Culture - Blood (09.21.19 @ 19:23)    Specimen Source: .Blood    Culture Results:   No growth to date.            RADIOLOGY REVIEWED:    < from: Xray Chest 1 View- PORTABLE-Routine (09.24.19 @ 14:42) >  IMPRESSION: Clear lungs.    < end of copied text >    < from: CT Angio Chest w/ IV Cont (09.21.19 @ 18:27) >  1. No pulmonary embolism.  2. Branching "tree-in-bud" airspace opacities in bilateral lower lobes,   right middle lobe and lingula likely infectious in etiology.  3. Focal groundglass airspace opacities in bilateral upper lobes, not   significant changed dating back to at least a CT of the chest from   10/2/2017, which could represent groundglass nodules.    < end of copied text > Abdominal Pain, N/V/D

## 2019-09-26 NOTE — PROVIDER CONTACT NOTE (CRITICAL VALUE NOTIFICATION) - ASSESSMENT
A&Ox4. VSS. No c/o distress. Pt currently NPO
A&Ox4. VSS. No c/o distress.
A&Ox4. VSS. No c/o distress. 12units of insulin given. NS @40ml started. Endo following.
A&Ox4. VSS. No c/o distress. Pt currently NPO & NS @40ml.
Pt. A&O4 denies SOB, palpitations, chest pain. VSS.
Pt. A&Ox4, no shortness of breath, VSS.
Pt. sleeping comfortable and denies chest pain, SOB, DISTRESS OR DISCOMFORT
patient a&o x4, vss, denies any c/o chest pain, dizziness, headache, lightheadedness, asymptomatic

## 2019-09-26 NOTE — CHART NOTE - NSCHARTNOTEFT_GEN_A_CORE
Medicine PA Note     NATHAN MEEKS  MRN-[id number]  Allergies  No Known Allergies      Called to evaluate patient for rapid rhythm on telemetry read as atrial fibrillation, with complaints of chronic R foot pain that is relieved with standing.   Initially, patient denying chest pain, palpitations, SOB. Upon further discussion endorses shortness of breath a little worse than baseline and some palpitations. Denies CP, N/V, dizziness. Endorses foot pain.    Vital Signs Last 24 Hrs  T(C): 36.6 (09-26-19 @ 15:18), Max: 37 (09-26-19 @ 04:38)  T(F): 97.8 (09-26-19 @ 15:18), Max: 98.6 (09-26-19 @ 04:38)  HR: 135 (09-26-19 @ 17:06) (72 - 135)  BP: 128/81 (09-26-19 @ 17:06) (100/62 - 133/69)  RR: 18 (09-26-19 @ 17:06) (18 - 18)  SpO2: 95% (09-26-19 @ 17:06) (94% - 98%)                        9.8    7.19  )-----------( 249      ( 26 Sep 2019 08:37 )             32.1     132<L>  |  94<L>  |  21  ----------------------------<  528<HH>  4.4   |  22  |  3.62<H>    Ca    9.2      26 Sep 2019 07:24      ABG - ( 25 Sep 2019 03:19 )  pH, Arterial: 7.39  pH, Blood: x     /  pCO2: 47    /  pO2: 37    / HCO3: 28    / Base Excess: 2.6   /  SaO2: 64    PHYSICAL EXAM:  GENERAL: NAD, well-developed  HEAD:  Atraumatic, Normocephalic  EYES: EOMI, PERRLA, conjunctiva and sclera clear  NECK: Supple, No JVD  CHEST/LUNG: Clear to auscultation bilaterally; No wheeze  HEART: Regular rate and rhythm; No murmurs, rubs, or gallops  ABDOMEN: Soft, Nontender, Nondistended; Bowel sounds present  EXTREMITIES:  2+ Peripheral Pulses, No clubbing, cyanosis, or edema  PSYCH: AAOx3  NEUROLOGY: non-focal  SKIN: No rashes or lesions    Assessment/Plan: HPI:  63 y/o F w/ PMHx ESRD (on HD) Type 1 DM, CHF, CAD, ischemic cardiomyopathy, COPD presents to the ED after dialysis with hypoxia ,states that she came home and has SOB, asked for oxygen at home from PMD who declined. No chest pain, no fever or cough, states that shes short of breathe all the time.  Pt was recently discharged form the hospital was admitted for DKA and exacerbation of COPD. Presenting now with foot pain, shortness of breath, and a rapid rhythm to 130s on telemetry.     Dx: 	Dyspnea 2/2 PNA, + Entero/Rhino. on vanco, zosyn, and azithro  	COPD exac. on steroids    	EKG abnormality, lateral TWI on EKG  	T1DM with hyperglycemia   	ESRD on HD    1. Rapid Rhythm:  -EKG revealed a sinus tachycardia with a heart rate of 130.  -HR broke to ~96 within 30 minutes and has remained between .  -Discussed with cardiologists Dr. Oliveira and Dr. Justyn Doty  -EKGs were sent to Dr. Tee Doty. Dr. Alejandra will follow-up tomorrow.   -No changes in medications or other interventions warranted at this time.   -Will continue to monitor. Patient on telemetry.     2. Foot pain:  -Chronic problem, patient given PRN pain medications as prescribed.     3. Shortness of breath:  -Patient reports chronic SOB, on 2 L NC.   -Patient given PRN breathing treatment. Medicine PA Note     NATHAN MEEKS  MRN-[id number]  Allergies  No Known Allergies      Called to evaluate patient for rapid rhythm on telemetry read as atrial fibrillation, with complaints of chronic R foot pain that is relieved with standing.   Initially, patient denying chest pain, palpitations, SOB. Upon further discussion endorses shortness of breath a little worse than baseline and some palpitations. Denies CP, N/V, dizziness. Endorses foot pain.    Vital Signs Last 24 Hrs  T(C): 36.6 (09-26-19 @ 15:18), Max: 37 (09-26-19 @ 04:38)  T(F): 97.8 (09-26-19 @ 15:18), Max: 98.6 (09-26-19 @ 04:38)  HR: 135 (09-26-19 @ 17:06) (72 - 135)  BP: 128/81 (09-26-19 @ 17:06) (100/62 - 133/69)  RR: 18 (09-26-19 @ 17:06) (18 - 18)  SpO2: 95% (09-26-19 @ 17:06) (94% - 98%)                        9.8    7.19  )-----------( 249      ( 26 Sep 2019 08:37 )             32.1     132<L>  |  94<L>  |  21  ----------------------------<  528<HH>  4.4   |  22  |  3.62<H>    Ca    9.2      26 Sep 2019 07:24      ABG - ( 25 Sep 2019 03:19 )  pH, Arterial: 7.39  pH, Blood: x     /  pCO2: 47    /  pO2: 37    / HCO3: 28    / Base Excess: 2.6   /  SaO2: 64    PHYSICAL EXAM:  GENERAL: NAD, well-developed  HEAD:  Atraumatic, Normocephalic  EYES: EOMI, PERRLA, conjunctiva and sclera clear  NECK: Supple, No JVD  CHEST/LUNG: Clear to auscultation bilaterally; No wheeze  HEART: Regular rate and rhythm; No murmurs, rubs, or gallops  ABDOMEN: Soft, Nontender, Nondistended; Bowel sounds present  EXTREMITIES:  2+ Peripheral Pulses, No clubbing, cyanosis, or edema  PSYCH: AAOx3  NEUROLOGY: non-focal  SKIN: No rashes or lesions    Assessment/Plan: HPI:  63 y/o F w/ PMHx ESRD (on HD) Type 1 DM, CHF, CAD, ischemic cardiomyopathy, COPD presents to the ED after dialysis with hypoxia ,states that she came home and has SOB, asked for oxygen at home from PMD who declined. No chest pain, no fever or cough, states that shes short of breathe all the time.  Pt was recently discharged form the hospital was admitted for DKA and exacerbation of COPD. Presenting now with foot pain, shortness of breath, and a rapid rhythm to 130s on telemetry.     Dx: 	Dyspnea 2/2 PNA, + Entero/Rhino. on vanco, zosyn, and azithro  	COPD exac. on steroids    	EKG abnormality, lateral TWI on EKG  	T1DM with hyperglycemia   	ESRD on HD    1. Rapid Rhythm:  -EKG revealed a sinus tachycardia with a heart rate of 130.  -HR broke to ~96 within 30 minutes and has remained between .  -Discussed with cardiologists Dr. Oliveira and Dr. Justyn Doty.  -EKGs were sent to Dr. Tee Doty. Dr. Alejandra will follow-up tomorrow.   -No changes in medications or other interventions warranted at this time.   -Will continue to monitor. Patient on telemetry.     2. Foot pain:  -Chronic problem, patient given PRN percocet 5/325 mg as prescribed.     3. Shortness of breath:  -Patient reports chronic SOB, on 2 L NC.   -Patient given PRN duoneb x 1.

## 2019-09-26 NOTE — PROGRESS NOTE ADULT - SUBJECTIVE AND OBJECTIVE BOX
Diabetes Follow up note:  Interval Hx:   65y/o M w/h/o uncontrolled TIDM (HbA1c 8.1% 9/19) c/b neuropathy and retinopathy as well as CAD s/p multiple stents, CHF (EF 50% 10/2018), TIA, PVD s/p b/l fem-pop bypass, ESRD> HD. Pt was discharged last week after having COPD exacerbation felt 2/2 viral illness from enterovirus now presents with worsening dyspnea and hypoxemia. Pt now on steroid therapy with worsened steroid induced hyperglycemia. Glucose at bedtime in 300s, pt received correction at that time but was not tested during the night. On AM FS, glucose noted to be in mid 500s. Pt seen at bedside. Ate Turkish Toast for breakfast this AM. Received NPH 5 units this AM as ordered and Humalog 21 units (13 of correction + 8 of premeal). No hypoglycemia symptoms but pt reports "I don't feel when I'm low and I don't feel when I'm high". Does not recall snacking overnight or early this AM.     Review of Systems:  General: as above.   GI: Tolerating POs without any N/V/D/ABD PAIN.   CV: No CP/SOB  ENDO: No S&Sx of hypoglycemia. no s/s of DKA.   MEDS:  atorvastatin 40 milliGRAM(s) Oral at bedtime    insulin glargine Injectable (LANTUS) 9 Unit(s) SubCutaneous at bedtime  insulin lispro (HumaLOG) corrective regimen sliding scale   SubCutaneous at bedtime  insulin lispro (HumaLOG) corrective regimen sliding scale   SubCutaneous three times a day before meals  insulin lispro Injectable (HumaLOG) 8 Unit(s) SubCutaneous three times a day before meals  insulin NPH human recombinant 5 Unit(s) SubCutaneous before breakfast  predniSONE   Tablet 40 milliGRAM(s) Oral daily    cefepime   IVPB 1000 milliGRAM(s) IV Intermittent every 24 hours    Allergies    No Known Allergies    PE:  General: Female lying in bed. NAD.   Vital Signs Last 24 Hrs  T(C): 37 (26 Sep 2019 04:38), Max: 37 (26 Sep 2019 04:38)  T(F): 98.6 (26 Sep 2019 04:38), Max: 98.6 (26 Sep 2019 04:38)  HR: 80 (26 Sep 2019 06:06) (72 - 88)  BP: 119/74 (26 Sep 2019 06:06) (100/62 - 124/71)  BP(mean): --  RR: 18 (26 Sep 2019 04:38) (17 - 18)  SpO2: 97% (26 Sep 2019 04:38) (95% - 97%)  Resp: Bilat. rhonchi and scattered wheeze.   Abd: Soft, NT,ND,   Extremities: Warm. B/L LE edema.   Neuro: A&O X3    LABS:    POCT Blood Glucose.: 516 mg/dL (09-26-19 @ 07:30)  POCT Blood Glucose.: 557 mg/dL (09-26-19 @ 07:29)  POCT Blood Glucose.: 337 mg/dL (09-25-19 @ 22:22)  POCT Blood Glucose.: 199 mg/dL (09-25-19 @ 18:29)  POCT Blood Glucose.: 243 mg/dL (09-25-19 @ 13:48)  POCT Blood Glucose.: 212 mg/dL (09-25-19 @ 11:34)  POCT Blood Glucose.: 171 mg/dL (09-25-19 @ 08:29)  POCT Blood Glucose.: 206 mg/dL (09-25-19 @ 06:04)  POCT Blood Glucose.: 114 mg/dL (09-25-19 @ 02:06)  POCT Blood Glucose.: 260 mg/dL (09-24-19 @ 22:45)  POCT Blood Glucose.: 449 mg/dL (09-24-19 @ 20:08)  POCT Blood Glucose.: 518 mg/dL (09-24-19 @ 16:23)  POCT Blood Glucose.: 590 mg/dL (09-24-19 @ 14:58)  POCT Blood Glucose.: >600 mg/dL (09-24-19 @ 11:35)  POCT Blood Glucose.: >600 mg/dL (09-24-19 @ 11:31)  POCT Blood Glucose.: 580 mg/dL (09-24-19 @ 07:44)  POCT Blood Glucose.: 561 mg/dL (09-24-19 @ 07:43)  POCT Blood Glucose.: 354 mg/dL (09-24-19 @ 02:01)  POCT Blood Glucose.: 199 mg/dL (09-23-19 @ 21:24)  POCT Blood Glucose.: 137 mg/dL (09-23-19 @ 19:54)  POCT Blood Glucose.: 134 mg/dL (09-23-19 @ 18:11)  POCT Blood Glucose.: 189 mg/dL (09-23-19 @ 11:40)                            9.8    7.19  )-----------( 249      ( 26 Sep 2019 08:37 )             32.1       09-25    134<L>  |  91<L>  |  30<H>  ----------------------------<  189<H>  3.5   |  23  |  5.08<H>    Ca    9.6      25 Sep 2019 06:22          Hemoglobin A1C, Whole Blood: 8.1 % <H> [4.0 - 5.6] (09-16-19 @ 08:04)            Contact number: isabel [phone] or [phone]

## 2019-09-26 NOTE — PROGRESS NOTE ADULT - ASSESSMENT
· Assessment		  65 yo F with ESRD (on HD M/Tu/Th/Sa), type 1 DM w/DKA in past, CAD, HFrEF (EF 50% on TTE from 10/18), TIA, and PVD, very recent admission here until yesterday for COPD exacerbation felt 2/2 viral illness from enterovirus now presents with worsening dyspnea and hypoxemia down to 80s (O2 sat 82% on intial ED evaluation per ED provider note).       Pneumonia of left lower lobe due to infectious organism.   - Concern for HCAP given multiple recent hospitalizations in setting of known viral URI.  Vancomycin by level  Zosyn 3.375g q12h  O2 supplemental PRN.  ID follow  Pulmonary follow     Chronic obstructive pulmonary disease, unspecified COPD type.   Duonebs q6h ATC  budesonide nebulizers q12h    EKG abnormalities.   Findings of lateral TWI on EKG. May be related to hypoxemia exacerbating underlying CAD. Pt with chronically elevated troponin T.  Cardiology follow Dr. 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   Lantus 9 units qhs  Humalog 3 units TID AC  ISS.   Endocrine follow noted  MICU evaluation noted    Chronic congestive heart failure, unspecified heart failure type.  Appears euvolemic at this time  Volume management via HD while inpatient, did receive HD already today  renal consult for HD.     Coronary artery disease involving native coronary artery of native heart without angina pectoris.   atorvastatin  Aspirin  Plavix    ESRD  HD  Nephrology follow Dr. Brayna Viera MD pager [phone]

## 2019-09-26 NOTE — PROVIDER CONTACT NOTE (CRITICAL VALUE NOTIFICATION) - BACKGROUND
Admitting diagnosis SOB
Admit: COPD exac., PNA, T1DM  PMH: ESRD
Admit: Dyspnea
Admitting diagnosis Dyspnea
PMH ACS, DKA type 1, CHF
admitting diagnosis : dyspnea
admitting diagnosis Dyspnea
admitting diagnosis Dyspnea

## 2019-09-26 NOTE — PROVIDER CONTACT NOTE (CRITICAL VALUE NOTIFICATION) - RECOMMENDATIONS
Made NP aware.
Notified the provider.
continue to monitor patient PA Niki aware

## 2019-09-26 NOTE — PROVIDER CONTACT NOTE (CRITICAL VALUE NOTIFICATION) - NAME OF MD/NP/PA/DO NOTIFIED:
Carmen Antunez PA
Carmen BASS
DELORES Pardo NP
[place]
Niki BASS
DELORES Pardo NP

## 2019-09-26 NOTE — PROGRESS NOTE ADULT - ASSESSMENT
63 yo F hx DM, CAD, ESRD, CHF, MR, AS, COPD, PVD s/p b/l fem-pop, hilar adenopathy,  recently here with fever and viral URI.  Brief abx given, pt remained afebrile, resp status stable.    She returns with increasing SOB, weakness, chills, persistent cough.  Afebrile.  WBC slightly low with normal diff.  Trop elevated.    r/o post viral pneumonia vs continuum of same process (PCR still with same EV/RV), now with component of CHF.    CT findings noted; again, ?of new infection/pneumonia or lag    Plan:  Adjust Cefepime back to 1 g daily in view of renal compromise, may limit abx to 5 days total if ok with pulmoanry  Check MRSA screen, reordered  yesterday-pending  Follow temps and CBC/diff   serial CXR  pulm f/u appreciated, will d/c abx after todays dose

## 2019-09-26 NOTE — PROVIDER CONTACT NOTE (CRITICAL VALUE NOTIFICATION) - ACTION/TREATMENT ORDERED:
Endo following. Keep Pt NPO. Continue to monitor and maintain safety.
PA made aware. Will continue to monitor.
PA notified- Await BMP and orders.
Endo is following, will adjust insulin as seem fit. Maintain NPO. Continue to monitor and maintain safety.
Give 4 extra units of insulin w/ standing premeal 7 sliding scale, total insulin given 15units. Continue to monitor and maintain safety.
KALI Hogan aware and order EKG for tomorrow am with labs as order. Will continue to monitor. Safety maintained.
Lantus 4 units given. NPO till 3pm & recheck FS. Repeat lab work @ 4pm. Continue to monitor and maintain safety.
as per KALI Cali aware, will continue to monitor endo following patient

## 2019-09-26 NOTE — PROVIDER CONTACT NOTE (OTHER) - ACTION/TREATMENT ORDERED:
Lexapro added to AM medications, patient requests to take medications at separate times. Educated of risks and benefits, will endorse to day nurse.
Continue to monitor patient.
KALI Buenrostro made aware, will continue to monitor and maintain safety.
PA made aware- Given 10 units of humalog as ordered by Endo. FSq4 as ordered.
Continue NPO. Redraw lab values at 4pm. Continue to monitor and maintain safety.
Np notified, Titrate 02 to 90-92 % SPO2 with patient placed on 5 LPM 02. Patient reassessed with SPO2 of 92 %, Will continue to monitor pt.
Provider made aware, STAT labwork ordered and drawn. 6 units of humalog SSI administered as ordered. Zosyn reordered with new solution. Will continue to monitor.
Seen by Endo. 12 units insulin given. Hold food for 2 hours. Start NS @40ml for 6 hours. Continue to monitor and maintain safety.
as per NP give ordered covered, Endo to follow up with patient continue to monitor
as per Niki BASS EKG ordered continued to monitor patient closely
none

## 2019-09-26 NOTE — PROGRESS NOTE ADULT - ASSESSMENT
65 y/o F w/ PMHx ESRD (HD M/T/T/Sat), IDDM, CHF, CAD, ischemic cardiomyopathy presents to the ED BIBA for fever. pt also in DKA, hyperglycemia and  with hyperkalemia as well as DKA and pneumonia     1- esrd  2- CHF  3- DKA hx   4- HTN   5- chf  6- shpt      hd am   chf improving   cont steroids  cont insulin hyperglycemia  hydralazine 25 mg tid   renvela one tab tid with meals

## 2019-09-26 NOTE — PROGRESS NOTE ADULT - SUBJECTIVE AND OBJECTIVE BOX
Patient is a 64y old  Female who presents with a chief complaint of Hypoxemia 2/2 healthcare associated pneumonia (26 Sep 2019 11:50)      SUBJECTIVE / OVERNIGHT EVENTS: still congested and with productive cough.  Review of Systems  chest pain no  palpitations no  sob yes  nausea no  headache no    MEDICATIONS  (STANDING):  ALBUTerol/ipratropium for Nebulization 3 milliLiter(s) Nebulizer every 6 hours  aspirin enteric coated 81 milliGRAM(s) Oral daily  atorvastatin 40 milliGRAM(s) Oral at bedtime  buDESOnide    Inhalation Suspension 0.5 milliGRAM(s) Inhalation two times a day  cefepime   IVPB 1000 milliGRAM(s) IV Intermittent every 24 hours  chlorhexidine 2% Cloths 1 Application(s) Topical <User Schedule>  clopidogrel Tablet 75 milliGRAM(s) Oral daily  dextrose 5%. 1000 milliLiter(s) (50 mL/Hr) IV Continuous <Continuous>  dextrose 50% Injectable 12.5 Gram(s) IV Push once  dextrose 50% Injectable 25 Gram(s) IV Push once  dextrose 50% Injectable 25 Gram(s) IV Push once  docusate sodium 100 milliGRAM(s) Oral three times a day  heparin  Injectable 5000 Unit(s) SubCutaneous two times a day  hydrALAZINE 25 milliGRAM(s) Oral three times a day  insulin glargine Injectable (LANTUS) 11 Unit(s) SubCutaneous at bedtime  insulin lispro (HumaLOG) corrective regimen sliding scale   SubCutaneous three times a day before meals  insulin lispro (HumaLOG) corrective regimen sliding scale   SubCutaneous <User Schedule>  insulin lispro Injectable (HumaLOG) 10 Unit(s) SubCutaneous three times a day before meals  insulin lispro Injectable (HumaLOG) 3 Unit(s) SubCutaneous at bedtime  insulin NPH human recombinant 5 Unit(s) SubCutaneous before breakfast  isosorbide   dinitrate Tablet (ISORDIL) 10 milliGRAM(s) Oral three times a day  metoprolol succinate ER 50 milliGRAM(s) Oral daily  Nephro-raphael 1 Tablet(s) Oral daily  pantoprazole    Tablet 40 milliGRAM(s) Oral before breakfast  predniSONE   Tablet 40 milliGRAM(s) Oral daily  sevelamer carbonate 800 milliGRAM(s) Oral three times a day with meals  sodium chloride 0.9%. 500 milliLiter(s) (40 mL/Hr) IV Continuous <Continuous>    MEDICATIONS  (PRN):  dextrose 40% Gel 15 Gram(s) Oral once PRN Blood Glucose LESS THAN 70 milliGRAM(s)/deciliter  glucagon  Injectable 1 milliGRAM(s) IntraMuscular once PRN Glucose LESS THAN 70 milligrams/deciliter  oxyCODONE    5 mG/acetaminophen 325 mG 1 Tablet(s) Oral every 6 hours PRN Severe Pain (7 - 10)  senna 2 Tablet(s) Oral at bedtime PRN Constipation      Vital Signs Last 24 Hrs  T(C): 37 (26 Sep 2019 11:40), Max: 37 (26 Sep 2019 04:38)  T(F): 98.6 (26 Sep 2019 11:40), Max: 98.6 (26 Sep 2019 04:38)  HR: 86 (26 Sep 2019 11:40) (72 - 88)  BP: 127/72 (26 Sep 2019 11:40) (100/62 - 127/72)  BP(mean): --  RR: 18 (26 Sep 2019 11:40) (17 - 18)  SpO2: 98% (26 Sep 2019 11:40) (96% - 98%)    PHYSICAL EXAM:  GENERAL: NAD  HEAD:  Atraumatic, Normocephalic  EYES: EOMI, PERRLA, conjunctiva and sclera clear  NECK: Supple, No JVD  CHEST/LUNG: rhonchi to auscultation bilaterally; No wheeze  HEART: Regular rate and rhythm; No murmurs, rubs, or gallops  ABDOMEN: Soft, Nontender, Nondistended; Bowel sounds present  EXTREMITIES:  2+ Peripheral Pulses, No clubbing, cyanosis, or edema  PSYCH: AAOx3  NEUROLOGY: non-focal  SKIN: No rashes or lesions    LABS:                        9.8    7.19  )-----------( 249      ( 26 Sep 2019 08:37 )             32.1     09-26    132<L>  |  94<L>  |  21  ----------------------------<  528<HH>  4.4   |  22  |  3.62<H>    Ca    9.2      26 Sep 2019 07:24                  RADIOLOGY & ADDITIONAL TESTS:    Imaging Personally Reviewed:    Consultant(s) Notes Reviewed:      Care Discussed with Consultants/Other Providers:

## 2019-09-27 ENCOUNTER — APPOINTMENT (OUTPATIENT)
Dept: PULMONOLOGY | Facility: CLINIC | Age: 64
End: 2019-09-27

## 2019-09-27 LAB
ANION GAP SERPL CALC-SCNC: 18 MMOL/L — HIGH (ref 5–17)
ANION GAP SERPL CALC-SCNC: 20 MMOL/L — HIGH (ref 5–17)
B-OH-BUTYR SERPL-SCNC: 0 MMOL/L — SIGNIFICANT CHANGE UP
B-OH-BUTYR SERPL-SCNC: 0.1 MMOL/L — SIGNIFICANT CHANGE UP
BUN SERPL-MCNC: 37 MG/DL — HIGH (ref 7–23)
BUN SERPL-MCNC: 37 MG/DL — HIGH (ref 7–23)
CALCIUM SERPL-MCNC: 9.5 MG/DL — SIGNIFICANT CHANGE UP (ref 8.4–10.5)
CALCIUM SERPL-MCNC: 9.6 MG/DL — SIGNIFICANT CHANGE UP (ref 8.4–10.5)
CHLORIDE SERPL-SCNC: 91 MMOL/L — LOW (ref 96–108)
CHLORIDE SERPL-SCNC: 92 MMOL/L — LOW (ref 96–108)
CO2 SERPL-SCNC: 20 MMOL/L — LOW (ref 22–31)
CO2 SERPL-SCNC: 22 MMOL/L — SIGNIFICANT CHANGE UP (ref 22–31)
CREAT SERPL-MCNC: 4.79 MG/DL — HIGH (ref 0.5–1.3)
CREAT SERPL-MCNC: 4.96 MG/DL — HIGH (ref 0.5–1.3)
GLUCOSE BLDC GLUCOMTR-MCNC: 136 MG/DL — HIGH (ref 70–99)
GLUCOSE BLDC GLUCOMTR-MCNC: 248 MG/DL — HIGH (ref 70–99)
GLUCOSE BLDC GLUCOMTR-MCNC: 256 MG/DL — HIGH (ref 70–99)
GLUCOSE BLDC GLUCOMTR-MCNC: 317 MG/DL — HIGH (ref 70–99)
GLUCOSE BLDC GLUCOMTR-MCNC: 383 MG/DL — HIGH (ref 70–99)
GLUCOSE SERPL-MCNC: 311 MG/DL — HIGH (ref 70–99)
GLUCOSE SERPL-MCNC: 356 MG/DL — HIGH (ref 70–99)
HCT VFR BLD CALC: 31.5 % — LOW (ref 34.5–45)
HGB BLD-MCNC: 9.6 G/DL — LOW (ref 11.5–15.5)
MAGNESIUM SERPL-MCNC: 2.2 MG/DL — SIGNIFICANT CHANGE UP (ref 1.6–2.6)
MCHC RBC-ENTMCNC: 30.5 GM/DL — LOW (ref 32–36)
MCHC RBC-ENTMCNC: 33.4 PG — SIGNIFICANT CHANGE UP (ref 27–34)
MCV RBC AUTO: 109.8 FL — HIGH (ref 80–100)
MRSA PCR RESULT.: DETECTED
PLATELET # BLD AUTO: 265 K/UL — SIGNIFICANT CHANGE UP (ref 150–400)
POTASSIUM SERPL-MCNC: 4.5 MMOL/L — SIGNIFICANT CHANGE UP (ref 3.5–5.3)
POTASSIUM SERPL-MCNC: 4.9 MMOL/L — SIGNIFICANT CHANGE UP (ref 3.5–5.3)
POTASSIUM SERPL-SCNC: 4.5 MMOL/L — SIGNIFICANT CHANGE UP (ref 3.5–5.3)
POTASSIUM SERPL-SCNC: 4.9 MMOL/L — SIGNIFICANT CHANGE UP (ref 3.5–5.3)
RBC # BLD: 2.87 M/UL — LOW (ref 3.8–5.2)
RBC # FLD: 14.3 % — SIGNIFICANT CHANGE UP (ref 10.3–14.5)
S AUREUS DNA NOSE QL NAA+PROBE: DETECTED
SODIUM SERPL-SCNC: 131 MMOL/L — LOW (ref 135–145)
SODIUM SERPL-SCNC: 132 MMOL/L — LOW (ref 135–145)
WBC # BLD: 9.98 K/UL — SIGNIFICANT CHANGE UP (ref 3.8–10.5)
WBC # FLD AUTO: 9.98 K/UL — SIGNIFICANT CHANGE UP (ref 3.8–10.5)

## 2019-09-27 PROCEDURE — 99232 SBSQ HOSP IP/OBS MODERATE 35: CPT

## 2019-09-27 PROCEDURE — 93925 LOWER EXTREMITY STUDY: CPT | Mod: 26

## 2019-09-27 RX ORDER — MUPIROCIN 20 MG/G
1 OINTMENT TOPICAL
Refills: 0 | Status: DISCONTINUED | OUTPATIENT
Start: 2019-09-27 | End: 2019-09-30

## 2019-09-27 RX ORDER — INSULIN GLARGINE 100 [IU]/ML
9 INJECTION, SOLUTION SUBCUTANEOUS AT BEDTIME
Refills: 0 | Status: DISCONTINUED | OUTPATIENT
Start: 2019-09-28 | End: 2019-09-28

## 2019-09-27 RX ORDER — INSULIN LISPRO 100/ML
VIAL (ML) SUBCUTANEOUS
Refills: 0 | Status: DISCONTINUED | OUTPATIENT
Start: 2019-09-27 | End: 2019-09-27

## 2019-09-27 RX ORDER — INSULIN LISPRO 100/ML
VIAL (ML) SUBCUTANEOUS
Refills: 0 | Status: COMPLETED | OUTPATIENT
Start: 2019-09-27 | End: 2019-09-27

## 2019-09-27 RX ORDER — INSULIN LISPRO 100/ML
VIAL (ML) SUBCUTANEOUS
Refills: 0 | Status: DISCONTINUED | OUTPATIENT
Start: 2019-09-28 | End: 2019-09-30

## 2019-09-27 RX ORDER — INSULIN LISPRO 100/ML
6 VIAL (ML) SUBCUTANEOUS
Refills: 0 | Status: DISCONTINUED | OUTPATIENT
Start: 2019-09-28 | End: 2019-09-29

## 2019-09-27 RX ORDER — INSULIN GLARGINE 100 [IU]/ML
11 INJECTION, SOLUTION SUBCUTANEOUS AT BEDTIME
Refills: 0 | Status: COMPLETED | OUTPATIENT
Start: 2019-09-27 | End: 2019-09-27

## 2019-09-27 RX ORDER — INSULIN LISPRO 100/ML
VIAL (ML) SUBCUTANEOUS AT BEDTIME
Refills: 0 | Status: DISCONTINUED | OUTPATIENT
Start: 2019-09-28 | End: 2019-09-30

## 2019-09-27 RX ORDER — INSULIN LISPRO 100/ML
VIAL (ML) SUBCUTANEOUS
Refills: 0 | Status: DISCONTINUED | OUTPATIENT
Start: 2019-09-27 | End: 2019-09-30

## 2019-09-27 RX ORDER — INSULIN LISPRO 100/ML
10 VIAL (ML) SUBCUTANEOUS
Refills: 0 | Status: COMPLETED | OUTPATIENT
Start: 2019-09-27 | End: 2019-09-27

## 2019-09-27 RX ORDER — MUPIROCIN 20 MG/G
1 OINTMENT TOPICAL
Refills: 0 | Status: DISCONTINUED | OUTPATIENT
Start: 2019-09-27 | End: 2019-09-27

## 2019-09-27 RX ADMIN — ISOSORBIDE DINITRATE 10 MILLIGRAM(S): 5 TABLET ORAL at 05:36

## 2019-09-27 RX ADMIN — Medication 10 UNIT(S): at 12:01

## 2019-09-27 RX ADMIN — Medication 10 UNIT(S): at 17:22

## 2019-09-27 RX ADMIN — Medication 3 MILLILITER(S): at 11:36

## 2019-09-27 RX ADMIN — OXYCODONE AND ACETAMINOPHEN 1 TABLET(S): 5; 325 TABLET ORAL at 21:49

## 2019-09-27 RX ADMIN — Medication 6: at 02:07

## 2019-09-27 RX ADMIN — Medication 40 MILLIGRAM(S): at 05:35

## 2019-09-27 RX ADMIN — Medication 0.5 MILLIGRAM(S): at 05:37

## 2019-09-27 RX ADMIN — ISOSORBIDE DINITRATE 10 MILLIGRAM(S): 5 TABLET ORAL at 21:49

## 2019-09-27 RX ADMIN — Medication 3 MILLILITER(S): at 23:41

## 2019-09-27 RX ADMIN — Medication 10 UNIT(S): at 08:12

## 2019-09-27 RX ADMIN — PANTOPRAZOLE SODIUM 40 MILLIGRAM(S): 20 TABLET, DELAYED RELEASE ORAL at 05:36

## 2019-09-27 RX ADMIN — Medication 0.5 MILLIGRAM(S): at 17:22

## 2019-09-27 RX ADMIN — Medication 8: at 08:12

## 2019-09-27 RX ADMIN — SEVELAMER CARBONATE 800 MILLIGRAM(S): 2400 POWDER, FOR SUSPENSION ORAL at 11:36

## 2019-09-27 RX ADMIN — MUPIROCIN 1 APPLICATION(S): 20 OINTMENT TOPICAL at 23:40

## 2019-09-27 RX ADMIN — ATORVASTATIN CALCIUM 40 MILLIGRAM(S): 80 TABLET, FILM COATED ORAL at 21:49

## 2019-09-27 RX ADMIN — SEVELAMER CARBONATE 800 MILLIGRAM(S): 2400 POWDER, FOR SUSPENSION ORAL at 08:13

## 2019-09-27 RX ADMIN — Medication 81 MILLIGRAM(S): at 11:36

## 2019-09-27 RX ADMIN — Medication 50 MILLIGRAM(S): at 08:17

## 2019-09-27 RX ADMIN — INSULIN GLARGINE 11 UNIT(S): 100 INJECTION, SOLUTION SUBCUTANEOUS at 21:49

## 2019-09-27 RX ADMIN — OXYCODONE AND ACETAMINOPHEN 1 TABLET(S): 5; 325 TABLET ORAL at 22:49

## 2019-09-27 RX ADMIN — Medication 3 MILLILITER(S): at 05:36

## 2019-09-27 RX ADMIN — HUMAN INSULIN 5 UNIT(S): 100 INJECTION, SUSPENSION SUBCUTANEOUS at 08:13

## 2019-09-27 RX ADMIN — Medication 25 MILLIGRAM(S): at 13:04

## 2019-09-27 RX ADMIN — Medication 3 MILLILITER(S): at 17:22

## 2019-09-27 RX ADMIN — CHLORHEXIDINE GLUCONATE 1 APPLICATION(S): 213 SOLUTION TOPICAL at 05:36

## 2019-09-27 RX ADMIN — Medication 3 UNIT(S): at 21:49

## 2019-09-27 RX ADMIN — ISOSORBIDE DINITRATE 10 MILLIGRAM(S): 5 TABLET ORAL at 13:04

## 2019-09-27 RX ADMIN — Medication 1 TABLET(S): at 11:36

## 2019-09-27 RX ADMIN — Medication 4: at 12:01

## 2019-09-27 RX ADMIN — CLOPIDOGREL BISULFATE 75 MILLIGRAM(S): 75 TABLET, FILM COATED ORAL at 11:36

## 2019-09-27 RX ADMIN — SEVELAMER CARBONATE 800 MILLIGRAM(S): 2400 POWDER, FOR SUSPENSION ORAL at 17:22

## 2019-09-27 RX ADMIN — Medication 3 MILLILITER(S): at 00:32

## 2019-09-27 RX ADMIN — Medication 6: at 17:22

## 2019-09-27 NOTE — PROGRESS NOTE ADULT - ASSESSMENT
65 yo F hx DM, CAD, ESRD, CHF, MR, AS, COPD, PVD s/p b/l fem-pop, hilar adenopathy,  recently here with fever and viral URI.  Brief abx given, pt remained afebrile, resp status stable.    She returns with increasing SOB, weakness, chills, persistent cough.  Afebrile.  WBC slightly low with normal diff.  Trop elevated.    r/o post viral pneumonia vs continuum of same process (PCR still with same EV/RV), now with component of CHF.    CT findings noted; again, ?of new infection/pneumonia or lag  S/P 5 days of cefepime and a few days of vanco  Plan:  supportive care  Monitor off antibiotics  Appreciate detailed cardiology f/u

## 2019-09-27 NOTE — PROGRESS NOTE ADULT - ASSESSMENT
· Assessment		  63 yo F with ESRD (on HD M/Tu/Th/Sa), type 1 DM w/DKA in past, CAD, HFrEF (EF 50% on TTE from 10/18), TIA, and PVD, very recent admission here until yesterday for COPD exacerbation felt 2/2 viral illness from enterovirus now presents with worsening dyspnea and hypoxemia down to 80s (O2 sat 82% on intial ED evaluation per ED provider note).       Pneumonia of left lower lobe due to infectious organism.   - Concern for HCAP given multiple recent hospitalizations in setting of known viral URI.  Vancomycin by level  Zosyn 3.375g q12h  O2 supplemental PRN.  ID follow  Pulmonary follow     Chronic obstructive pulmonary disease, unspecified COPD type.   Duonebs q6h ATC  budesonide nebulizers q12h    EKG abnormalities.   Findings of lateral TWI on EKG. May be related to hypoxemia exacerbating underlying CAD. Pt with chronically elevated troponin T.  Cardiology follow Dr. 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   Lantus 9 units qhs  Humalog 3 units TID AC  ISS.   Endocrine follow noted    Chronic congestive heart failure, unspecified heart failure type.  Appears euvolemic at this time  Volume management via HD while inpatient, did receive HD already today  renal consult for HD.     Coronary artery disease involving native coronary artery of native heart without angina pectoris.   atorvastatin  Aspirin  Plavix    ESRD  HD  Nephrology follow Dr. Schmidt    Left leg pain  - vascular evaluation noted  - no intervention  - PVR with open graft.    d/w patient and   Pierce Viera MD pager [phone]

## 2019-09-27 NOTE — CONSULT NOTE ADULT - ATTENDING COMMENTS
Metabolic acidosis with elevated lactate. No evidence of DKA given normal beta-hydroxybutyrate. Lactic acidosis of unclear etiology. Possibly due to hyperglycemia vs. albuterol. No hemodynamic instability. Currently, she is refusing MICU admission because she is feeling well. She is AAOx3 and has capacity to make decisions. Continue insulin management per Endo. Please call back with questions.
Full consult note as above; discussed with surgery resident and vacular fellow.   She has multiple medical conditions including CAD, DM, ESRD and has PAD. She had bilateral leg bypasses. She had a redo bypass in her left leg. She has chronic mild left leg swelling most likely due to lymphedema. The swelling is potentially worse due to fluid overload. It has been improving with dialysis . She had an arterial duplex scan that noted patent bilateral leg bypasses. She has some pain in her left foot that has been contributed to neuropathy. Her vascular condition is stable and does not require any intervention at this time.

## 2019-09-27 NOTE — PROGRESS NOTE ADULT - SUBJECTIVE AND OBJECTIVE BOX
Patient is a 64y old  Female who presents with a chief complaint of Hypoxemia 2/2 healthcare associated pneumonia (27 Sep 2019 16:02)      SUBJECTIVE / OVERNIGHT EVENTS: Comfortable. Had L leg pain.   Review of Systems  chest pain no  palpitations no  sob no  nausea no  headache no    MEDICATIONS  (STANDING):  ALBUTerol/ipratropium for Nebulization 3 milliLiter(s) Nebulizer every 6 hours  aspirin enteric coated 81 milliGRAM(s) Oral daily  atorvastatin 40 milliGRAM(s) Oral at bedtime  buDESOnide    Inhalation Suspension 0.5 milliGRAM(s) Inhalation two times a day  chlorhexidine 2% Cloths 1 Application(s) Topical <User Schedule>  clopidogrel Tablet 75 milliGRAM(s) Oral daily  dextrose 5%. 1000 milliLiter(s) (50 mL/Hr) IV Continuous <Continuous>  dextrose 50% Injectable 12.5 Gram(s) IV Push once  dextrose 50% Injectable 25 Gram(s) IV Push once  dextrose 50% Injectable 25 Gram(s) IV Push once  docusate sodium 100 milliGRAM(s) Oral three times a day  heparin  Injectable 5000 Unit(s) SubCutaneous two times a day  hydrALAZINE 25 milliGRAM(s) Oral three times a day  insulin glargine Injectable (LANTUS) 11 Unit(s) SubCutaneous at bedtime  insulin lispro (HumaLOG) corrective regimen sliding scale   SubCutaneous <User Schedule>  insulin lispro (HumaLOG) corrective regimen sliding scale   SubCutaneous <User Schedule>  insulin lispro Injectable (HumaLOG) 3 Unit(s) SubCutaneous at bedtime  isosorbide   dinitrate Tablet (ISORDIL) 10 milliGRAM(s) Oral three times a day  metoprolol succinate ER 50 milliGRAM(s) Oral daily  mupirocin 2% Ointment 1 Application(s) Both Nostrils two times a day  Nephro-raphael 1 Tablet(s) Oral daily  pantoprazole    Tablet 40 milliGRAM(s) Oral before breakfast  sevelamer carbonate 800 milliGRAM(s) Oral three times a day with meals  sodium chloride 0.9%. 500 milliLiter(s) (40 mL/Hr) IV Continuous <Continuous>    MEDICATIONS  (PRN):  dextrose 40% Gel 15 Gram(s) Oral once PRN Blood Glucose LESS THAN 70 milliGRAM(s)/deciliter  glucagon  Injectable 1 milliGRAM(s) IntraMuscular once PRN Glucose LESS THAN 70 milligrams/deciliter  oxyCODONE    5 mG/acetaminophen 325 mG 1 Tablet(s) Oral every 6 hours PRN Severe Pain (7 - 10)  senna 2 Tablet(s) Oral at bedtime PRN Constipation      Vital Signs Last 24 Hrs  T(C): 36.7 (27 Sep 2019 18:05), Max: 36.8 (27 Sep 2019 03:56)  T(F): 98 (27 Sep 2019 18:05), Max: 98.3 (27 Sep 2019 03:56)  HR: 83 (27 Sep 2019 18:05) (83 - 93)  BP: 131/67 (27 Sep 2019 18:05) (108/57 - 131/73)  BP(mean): --  RR: 18 (27 Sep 2019 18:05) (18 - 18)  SpO2: 98% (27 Sep 2019 18:05) (92% - 98%)    PHYSICAL EXAM:  GENERAL: NAD  HEAD:  Atraumatic, Normocephalic  EYES: EOMI, PERRLA, conjunctiva and sclera clear  NECK: Supple, No JVD  CHEST/LUNG: Clear to auscultation bilaterally; No wheeze  HEART: Regular rate and rhythm; No murmurs, rubs, or gallops  ABDOMEN: Soft, Nontender, Nondistended; Bowel sounds present  EXTREMITIES:  L leg 1+e  PSYCH: Anxious  NEUROLOGY: non-focal  SKIN: No rashes or lesions    LABS:                        9.6    9.98  )-----------( 265      ( 27 Sep 2019 07:45 )             31.5     09-27    131<L>  |  91<L>  |  37<H>  ----------------------------<  311<H>  4.9   |  20<L>  |  4.79<H>    Ca    9.5      27 Sep 2019 06:33  Mg     2.2     09-27                  RADIOLOGY & ADDITIONAL TESTS:    Imaging Personally Reviewed:    Consultant(s) Notes Reviewed:      Care Discussed with Consultants/Other Providers:

## 2019-09-27 NOTE — PROGRESS NOTE ADULT - SUBJECTIVE AND OBJECTIVE BOX
Diabetes Follow up note: Saw pt earlier today  Interval Hx: 65y/o M w/h/o uncontrolled TIDM (HbA1c 8.1% 9/19) c/b neuropathy and retinopathy as well as CAD s/p multiple stents, CHF (EF 50% 10/2018), TIA, PVD s/p b/l fem-pop bypass, ESRD> HD. Pt was discharged last week after having COPD exacerbation felt 2/2 viral illness from enterovirus now presents with worsening dyspnea and hypoxemia. Pt on steroid therapy with worsened steroid induced hyperglycemia. Glucose levels 200s to 300s while on present insulin regimen. Received last dose of Prednisone this am and per primary team no need for more prednisone. Pt tolerating POs with on/off nutritional indiscretions which makes glycemic control more difficult to achieve. Reports still coughing but feels better.       Review of Systems:  General: as above.   GI: Tolerating POs without any N/V/D/ABD PAIN.   CV: No CP/SOB  ENDO: No S&Sx of hypoglycemia. no s/s of DKA.       MEDS:  atorvastatin 40 milliGRAM(s) Oral at bedtime  insulin glargine Injectable (LANTUS) 11 Unit(s) SubCutaneous at bedtime  insulin lispro (HumaLOG) corrective regimen sliding scale   SubCutaneous at bedtime  insulin lispro (HumaLOG) corrective regimen sliding scale   SubCutaneous three times a day before meals  insulin lispro Injectable (HumaLOG) 10 Unit(s) SubCutaneous three times a day before meals  insulin lispro Injectable (HumaLOG) 3 Unit(s) SubCutaneous at bedtime  insulin NPH human recombinant 5 Unit(s) SubCutaneous before breakfast  predniSONE   Tablet 40 milliGRAM(s) Oral daily      Allergies    No Known Allergies    PE:  General: Female sitting in chair in NAD.   Vital Signs Last 24 Hrs  T(C): 36.4 (09-27-19 @ 11:57), Max: 36.8 (09-27-19 @ 03:56)  T(F): 97.6 (09-27-19 @ 11:57), Max: 98.3 (09-27-19 @ 03:56)  HR: 84 (09-27-19 @ 11:57) (84 - 135)  BP: 131/73 (09-27-19 @ 11:57) (108/57 - 137/78)  BP(mean): --  RR: 18 (09-27-19 @ 11:57) (18 - 18)  SpO2: 97% (09-27-19 @ 11:57) (92% - 98%)  Abd: Soft, NT, ND,   Extremities: Warm. B/L LE edema stable.   Neuro: A&O X3    LABS:  POCT Blood Glucose.: 248 mg/dL (09-27-19 @ 11:58)  POCT Blood Glucose.: 317 mg/dL (09-27-19 @ 08:04)  POCT Blood Glucose.: 383 mg/dL (09-27-19 @ 02:02)  POCT Blood Glucose.: 285 mg/dL (09-26-19 @ 21:26)  POCT Blood Glucose.: 258 mg/dL (09-26-19 @ 16:48)  POCT Blood Glucose.: 269 mg/dL (09-26-19 @ 11:39)  POCT Blood Glucose.: 516 mg/dL (09-26-19 @ 07:30)  POCT Blood Glucose.: 557 mg/dL (09-26-19 @ 07:29)  POCT Blood Glucose.: 337 mg/dL (09-25-19 @ 22:22)  POCT Blood Glucose.: 199 mg/dL (09-25-19 @ 18:29)                          9.6    9.98  )-----------( 265      ( 27 Sep 2019 07:45 )             31.5     09-27    131<L>  |  91<L>  |  37<H>  ----------------------------<  311<H>  4.9   |  20<L>  |  4.79<H>    Ca    9.5      27 Sep 2019 06:33  Mg     2.2     09-27      Hemoglobin A1C, Whole Blood: 8.1 % <H> [4.0 - 5.6] (09-16-19 @ 08:04)

## 2019-09-27 NOTE — PROGRESS NOTE ADULT - SUBJECTIVE AND OBJECTIVE BOX
Cardiovascular Disease Progress Note    Overnight events: No acute events overnight.  leg pain when standing. no cp/sob  Otherwise review of systems negative    Objective Findings:  T(C): 36.8 (09-27-19 @ 03:56), Max: 37 (09-26-19 @ 11:40)  HR: 93 (09-27-19 @ 06:55) (85 - 135)  BP: 112/63 (09-27-19 @ 06:55) (108/57 - 137/78)  RR: 18 (09-27-19 @ 03:56) (18 - 18)  SpO2: 98% (09-27-19 @ 03:56) (94% - 98%)  Wt(kg): --  Daily     Daily       Physical Exam:  Gen: NAD  HEENT: EOMI  CV: RRR, normal S1 + S2, no m/r/g  Lungs: CTAB  Abd: soft, non-tender  Ext: No edema    Telemetry: nsr pAT    Laboratory Data:                        9.8    7.19  )-----------( 249      ( 26 Sep 2019 08:37 )             32.1     09-27    131<L>  |  91<L>  |  37<H>  ----------------------------<  311<H>  4.9   |  20<L>  |  4.79<H>    Ca    9.5      27 Sep 2019 06:33  Mg     2.2     09-27                Inpatient Medications:  MEDICATIONS  (STANDING):  ALBUTerol/ipratropium for Nebulization 3 milliLiter(s) Nebulizer every 6 hours  aspirin enteric coated 81 milliGRAM(s) Oral daily  atorvastatin 40 milliGRAM(s) Oral at bedtime  buDESOnide    Inhalation Suspension 0.5 milliGRAM(s) Inhalation two times a day  chlorhexidine 2% Cloths 1 Application(s) Topical <User Schedule>  clopidogrel Tablet 75 milliGRAM(s) Oral daily  dextrose 5%. 1000 milliLiter(s) (50 mL/Hr) IV Continuous <Continuous>  dextrose 50% Injectable 12.5 Gram(s) IV Push once  dextrose 50% Injectable 25 Gram(s) IV Push once  dextrose 50% Injectable 25 Gram(s) IV Push once  docusate sodium 100 milliGRAM(s) Oral three times a day  heparin  Injectable 5000 Unit(s) SubCutaneous two times a day  hydrALAZINE 25 milliGRAM(s) Oral three times a day  insulin glargine Injectable (LANTUS) 11 Unit(s) SubCutaneous at bedtime  insulin lispro (HumaLOG) corrective regimen sliding scale   SubCutaneous three times a day before meals  insulin lispro (HumaLOG) corrective regimen sliding scale   SubCutaneous <User Schedule>  insulin lispro Injectable (HumaLOG) 10 Unit(s) SubCutaneous three times a day before meals  insulin lispro Injectable (HumaLOG) 3 Unit(s) SubCutaneous at bedtime  insulin NPH human recombinant 5 Unit(s) SubCutaneous before breakfast  isosorbide   dinitrate Tablet (ISORDIL) 10 milliGRAM(s) Oral three times a day  metoprolol succinate ER 50 milliGRAM(s) Oral daily  Nephro-raphael 1 Tablet(s) Oral daily  pantoprazole    Tablet 40 milliGRAM(s) Oral before breakfast  sevelamer carbonate 800 milliGRAM(s) Oral three times a day with meals  sodium chloride 0.9%. 500 milliLiter(s) (40 mL/Hr) IV Continuous <Continuous>      Assessment:  -SOB  -pAT  -HCAP - recent admission for viral URI/RVP +  -elevated but stable cardiac enzymes (hs trop) with recent admission with relatively preserved ck/ck mb fraction and no obvious ischemic changes on ekg  -chest pain with mild ekg changes and stable hs trop  -NSVT  -pAT  -volume overload  -ischemic cardiomyopathy, cad s/p multiple stents  -esrd on hd  -PAD s/p prior intervention         Recs:  -abx for HCAP per ID  -f/u pulm  -known extensive CAD and ICM. s/p [facility] 2/20/2019 --> severe and diffuse instent restenosis along RCA --> unable to stent due to multiple layers of stenting and prior brachytherapy  -will consider complex intervention if true ischemic and refractory sx develop   -c/w beta blockers for nsvt/pat/cad (as above). currently on toprol 50mg, isordil 10mg tid, hydral 25mg tid. uptitrate GDMT as tolerates. wouldnt inc bb at this time 2/2 bronchospasm  -brief episodes of pAT, less likely pAF. c/w tele monitoring and toprol. hold AC for now  -hx of prolonged qtc. avoid qtc prolonging meds  -s/p TTE 2019: mod-severe MR, pseudo AS (low flow-low gradient), mod-severe LV dysfunction --> recent CTS consult with dr hebert appreciated. plan is for medical management given surgical risk and patient preference  -c/w asa, plavix, statin for ischemic cardiomyopathy/CAD/PAD  -dvt ppx        Over 25 minutes spent on total encounter; more than 50% of the visit was spent counseling and/or coordinating care by the attending physician.      Julián Biswas MD   Cardiovascular Disease  [phone]

## 2019-09-27 NOTE — PROGRESS NOTE ADULT - ASSESSMENT
65y/o M w/h/o uncontrolled TIDM (HbA1c 8.1% 9/19) c/b neuropathy and retinopathy as well as CAD s/p multiple stents, CHF (EF 50% 10/2018), TIA, PVD s/p b/l fem-pop bypass, ESRD> HD. Recent admission with COPD exacerbation felt 2/2 viral illness from enterovirus now presents with worsening dyspnea and hypoxemia. Now treated for PNA > on antibiotic > on steroids. Pt remains hyperglycemic but with BG improved. However, received last dose of Prednisone dose this am so BG expected to improve. Will adjust insulin doses since steroids will be wearing off. BG goal (100-low 200s). No hypoglycemia

## 2019-09-27 NOTE — CONSULT NOTE ADULT - REASON FOR ADMISSION
Hypoxemia 2/2 healthcare associated pneumonia

## 2019-09-27 NOTE — PROGRESS NOTE ADULT - PROBLEM SELECTOR PLAN 1
-test BG AC/HS/2AM  -Change Lantus dose back to 9 units QHS  -Discontinue NPH insulin  -Change Humalog to 6 units AC meals starting tomorrow  -C/w Humalog 3 units w/bedtime snack  -Decrease Humalog correction scales to low dose tomorrow. Continue moderate dose today. ac/hs and 2am  -Please contact endo team with any changes on POS/steroids   -Plan discussed with pt/team.  Contact info: [phone] (24/7). pager [phone] -test BG AC/HS/2AM  -Change Lantus dose back to 9 units QHS starting tomorrow. Can get 11 units tonight  -Discontinue NPH insulin  -Change Humalog to 6 units AC meals starting tomorrow. Continue 10 units today  -C/w Humalog 3 units w/bedtime snack  -Decrease Humalog correction scales to low dose tomorrow. Continue moderate dose today. ac/hs   -Please contact endo team with any changes on POS/steroids   -Plan discussed with pt/team.  Contact info: [phone] (24/7). pager [phone]

## 2019-09-27 NOTE — CONSULT NOTE ADULT - SUBJECTIVE AND OBJECTIVE BOX
VASCULAR SURGERY CONSULT NOTE    Patient is a 64y old  Female who presents with a chief complaint of Hypoxemia 2/2 healthcare associated pneumonia (27 Sep 2019 12:15), vascular surgery consulted for left lower extremity pain     HPI: Patient is a 63 yo F with ESRD (on HD M/Tu/Th/Sa), type 1 DM w/DKA in past, CAD ([facility] on 02/02/2019 showed restenosis along RCA not amenable for intervention), HFrEF (EF 50% on TTE from 10/18), TIA, and PVD (bilateral fem-pop bypass in 2013 with multiple angiogram/angioplasty since, and ileofemoral endarterectomy with vein patch 2015)     10-points review of system performed with pertinent negative and postive findings documented in the HPI     PAST MEDICAL & SURGICAL HISTORY:  COPD (chronic obstructive pulmonary disease)  Localized enlarged lymph nodes  CHF (congestive heart failure): EF 40-45%  Subclavian vein stenosis, left: s/p stent  DKA, type 1: 1/2015  ACS (acute coronary syndrome): 1/2015 - cath revealed 100% ostial stenosis not amenable to PCI - medical management  TIA (transient ischemic attack): x 2 - 8-9 years ago prior to ASD/VSD repair  CAD (coronary artery disease): s/p stents  Gout: past  CVA (cerebral infarction): with no residual, 8 yrs ago, prior to heart surgery - ST memory loss  Peripheral vascular disease: occluded left fem-pop bypass 5/2015  Diabetes mellitus type 1: Insulin Dependent -  ESRD (end stage renal disease): dialysis  M, tue, thursday, saturday  Hyperlipidemia  Status post device closure of ASD: &quot;clamshell&quot;  History of cardiac catheterization: 1/2015 - no intervention  S/P femoral-popliteal bypass surgery: L and R in 2013 with graft; 5/2015 CFA angioplasty left and ileofemoral endarterectomywith vein patch angioplasty of left fem-pop bypass graft  Multiple vascular surgery both leg, left fempop bypass revision 11/2015  AV (arteriovenous fistula): Left AV graft; revision with stent placement 2-3 years ago  S/P cholecystectomy    [  ] No significant past history as reviewed with the patient and family    FAMILY HISTORY:  Family history of smoking  Family history of hypertension  Family history of cancer (Sibling)  : Family history not pertinent as reviewed with the patient and family    SOCIAL HISTORY: No pertinent social history    MEDICATIONS  (STANDING):  ALBUTerol/ipratropium for Nebulization 3 milliLiter(s) Nebulizer every 6 hours  aspirin enteric coated 81 milliGRAM(s) Oral daily  atorvastatin 40 milliGRAM(s) Oral at bedtime  buDESOnide    Inhalation Suspension 0.5 milliGRAM(s) Inhalation two times a day  chlorhexidine 2% Cloths 1 Application(s) Topical <User Schedule>  clopidogrel Tablet 75 milliGRAM(s) Oral daily  dextrose 5%. 1000 milliLiter(s) (50 mL/Hr) IV Continuous <Continuous>  dextrose 50% Injectable 12.5 Gram(s) IV Push once  dextrose 50% Injectable 25 Gram(s) IV Push once  dextrose 50% Injectable 25 Gram(s) IV Push once  docusate sodium 100 milliGRAM(s) Oral three times a day  heparin  Injectable 5000 Unit(s) SubCutaneous two times a day  hydrALAZINE 25 milliGRAM(s) Oral three times a day  insulin glargine Injectable (LANTUS) 11 Unit(s) SubCutaneous at bedtime  insulin lispro (HumaLOG) corrective regimen sliding scale   SubCutaneous three times a day before meals  insulin lispro (HumaLOG) corrective regimen sliding scale   SubCutaneous <User Schedule>  insulin lispro Injectable (HumaLOG) 10 Unit(s) SubCutaneous three times a day before meals  insulin lispro Injectable (HumaLOG) 3 Unit(s) SubCutaneous at bedtime  insulin NPH human recombinant 5 Unit(s) SubCutaneous before breakfast  isosorbide   dinitrate Tablet (ISORDIL) 10 milliGRAM(s) Oral three times a day  metoprolol succinate ER 50 milliGRAM(s) Oral daily  Nephro-raphael 1 Tablet(s) Oral daily  pantoprazole    Tablet 40 milliGRAM(s) Oral before breakfast  sevelamer carbonate 800 milliGRAM(s) Oral three times a day with meals  sodium chloride 0.9%. 500 milliLiter(s) (40 mL/Hr) IV Continuous <Continuous>    MEDICATIONS  (PRN):  dextrose 40% Gel 15 Gram(s) Oral once PRN Blood Glucose LESS THAN 70 milliGRAM(s)/deciliter  glucagon  Injectable 1 milliGRAM(s) IntraMuscular once PRN Glucose LESS THAN 70 milligrams/deciliter  oxyCODONE    5 mG/acetaminophen 325 mG 1 Tablet(s) Oral every 6 hours PRN Severe Pain (7 - 10)  senna 2 Tablet(s) Oral at bedtime PRN Constipation    Allergies    No Known Allergies    Intolerances        Vital Signs Last 24 Hrs  T(C): 36.4 (27 Sep 2019 11:57), Max: 36.8 (27 Sep 2019 03:56)  T(F): 97.6 (27 Sep 2019 11:57), Max: 98.3 (27 Sep 2019 03:56)  HR: 84 (27 Sep 2019 11:57) (84 - 135)  BP: 131/73 (27 Sep 2019 11:57) (108/57 - 137/78)  BP(mean): --  RR: 18 (27 Sep 2019 11:57) (18 - 18)  SpO2: 97% (27 Sep 2019 11:57) (92% - 98%)  Daily     Daily     Exam:  General:  HEENT:  Resp:  Chest:   Abd:  Ext:  Neuro:                          9.6    9.98  )-----------( 265      ( 27 Sep 2019 07:45 )             31.5     09-27    131<L>  |  91<L>  |  37<H>  ----------------------------<  311<H>  4.9   |  20<L>  |  4.79<H>    Ca    9.5      27 Sep 2019 06:33  Mg     2.2     09-27            IMAGING STUDIES: VASCULAR SURGERY CONSULT NOTE    Patient is a 64y old  Female who presents with a chief complaint of Hypoxemia 2/2 healthcare associated pneumonia (27 Sep 2019 12:15), vascular surgery consulted for left lower extremity pain     HPI: Patient is a 63 yo F with ESRD (on HD M/Tu/Th/Sa), type 1 DM w/DKA in past, CAD ([facility] on 02/02/2019 showed restenosis along RCA not amenable for intervention), HFrEF (EF 50% on TTE from 10/18), TIA, and PVD (bilateral fem-pop bypass in 2013 with multiple angiogram/angioplasty since, and ileofemoral endarterectomy with vein patch 2015) admitted for COPD exacerbation likely secondary to pneumonia. Vascular surgery consulted for left lower extremity pain, which patient has been having for "couple years". Patient is unable to characterize the pain but denies worsening pain with ambulation. Patient was last seen by Dr. Elizalde about 2 months ago.     10-points review of system performed with pertinent negative and positive findings documented in the HPI     PAST MEDICAL & SURGICAL HISTORY:  COPD (chronic obstructive pulmonary disease)  Localized enlarged lymph nodes  CHF (congestive heart failure): EF 40-45%  Subclavian vein stenosis, left: s/p stent  DKA, type 1: 1/2015  ACS (acute coronary syndrome): 1/2015 - cath revealed 100% ostial stenosis not amenable to PCI - medical management  TIA (transient ischemic attack): x 2 - 8-9 years ago prior to ASD/VSD repair  CAD (coronary artery disease): s/p stents  Gout: past  CVA (cerebral infarction): with no residual, 8 yrs ago, prior to heart surgery - ST memory loss  Peripheral vascular disease: occluded left fem-pop bypass 5/2015  Diabetes mellitus type 1: Insulin Dependent -  ESRD (end stage renal disease): dialysis  M, tue, thursday, saturday  Hyperlipidemia  Status post device closure of ASD: &quot;clamshell&quot;  History of cardiac catheterization: 1/2015 - no intervention  S/P femoral-popliteal bypass surgery: L and R in 2013 with graft; 5/2015 CFA angioplasty left and ileofemoral endarterectomywith vein patch angioplasty of left fem-pop bypass graft  Multiple vascular surgery both leg, left fempop bypass revision 11/2015  AV (arteriovenous fistula): Left AV graft; revision with stent placement 2-3 years ago  S/P cholecystectomy    FAMILY HISTORY:  Family history of smoking  Family history of hypertension  Family history of cancer (Sibling)    SOCIAL HISTORY: No pertinent social history    MEDICATIONS  (STANDING):  ALBUTerol/ipratropium for Nebulization 3 milliLiter(s) Nebulizer every 6 hours  aspirin enteric coated 81 milliGRAM(s) Oral daily  atorvastatin 40 milliGRAM(s) Oral at bedtime  buDESOnide    Inhalation Suspension 0.5 milliGRAM(s) Inhalation two times a day  chlorhexidine 2% Cloths 1 Application(s) Topical <User Schedule>  clopidogrel Tablet 75 milliGRAM(s) Oral daily  dextrose 5%. 1000 milliLiter(s) (50 mL/Hr) IV Continuous <Continuous>  dextrose 50% Injectable 12.5 Gram(s) IV Push once  dextrose 50% Injectable 25 Gram(s) IV Push once  dextrose 50% Injectable 25 Gram(s) IV Push once  docusate sodium 100 milliGRAM(s) Oral three times a day  heparin  Injectable 5000 Unit(s) SubCutaneous two times a day  hydrALAZINE 25 milliGRAM(s) Oral three times a day  insulin glargine Injectable (LANTUS) 11 Unit(s) SubCutaneous at bedtime  insulin lispro (HumaLOG) corrective regimen sliding scale   SubCutaneous three times a day before meals  insulin lispro (HumaLOG) corrective regimen sliding scale   SubCutaneous <User Schedule>  insulin lispro Injectable (HumaLOG) 10 Unit(s) SubCutaneous three times a day before meals  insulin lispro Injectable (HumaLOG) 3 Unit(s) SubCutaneous at bedtime  insulin NPH human recombinant 5 Unit(s) SubCutaneous before breakfast  isosorbide   dinitrate Tablet (ISORDIL) 10 milliGRAM(s) Oral three times a day  metoprolol succinate ER 50 milliGRAM(s) Oral daily  Nephro-raphael 1 Tablet(s) Oral daily  pantoprazole    Tablet 40 milliGRAM(s) Oral before breakfast  sevelamer carbonate 800 milliGRAM(s) Oral three times a day with meals  sodium chloride 0.9%. 500 milliLiter(s) (40 mL/Hr) IV Continuous <Continuous>    MEDICATIONS  (PRN):  dextrose 40% Gel 15 Gram(s) Oral once PRN Blood Glucose LESS THAN 70 milliGRAM(s)/deciliter  glucagon  Injectable 1 milliGRAM(s) IntraMuscular once PRN Glucose LESS THAN 70 milligrams/deciliter  oxyCODONE    5 mG/acetaminophen 325 mG 1 Tablet(s) Oral every 6 hours PRN Severe Pain (7 - 10)  senna 2 Tablet(s) Oral at bedtime PRN Constipation    Allergies: No Known Allergies    Exam:   Vital Signs Last 24 Hrs  T(C): 36.4 (27 Sep 2019 11:57), Max: 36.8 (27 Sep 2019 03:56)  T(F): 97.6 (27 Sep 2019 11:57), Max: 98.3 (27 Sep 2019 03:56)  HR: 84 (27 Sep 2019 11:57) (84 - 135)  BP: 131/73 (27 Sep 2019 11:57) (108/57 - 137/78)  BP(mean): --  RR: 18 (27 Sep 2019 11:57) (18 - 18)  SpO2: 97% (27 Sep 2019 11:57) (92% - 98%)    General: not in distress, conversational   Respiratory: unlabored breathing   Ext: feet warm, no skin break down, no wound seen on the feet; left DP/PT biphasic signal, right DP signal                           9.6    9.98  )-----------( 265      ( 27 Sep 2019 07:45 )             31.5     09-27    131<L>  |  91<L>  |  37<H>  ----------------------------<  311<H>  4.9   |  20<L>  |  4.79<H>    Ca    9.5      27 Sep 2019 06:33  Mg     2.2     09-27

## 2019-09-28 LAB
ANION GAP SERPL CALC-SCNC: 16 MMOL/L — SIGNIFICANT CHANGE UP (ref 5–17)
BUN SERPL-MCNC: 26 MG/DL — HIGH (ref 7–23)
CALCIUM SERPL-MCNC: 10.1 MG/DL — SIGNIFICANT CHANGE UP (ref 8.4–10.5)
CHLORIDE SERPL-SCNC: 92 MMOL/L — LOW (ref 96–108)
CO2 SERPL-SCNC: 26 MMOL/L — SIGNIFICANT CHANGE UP (ref 22–31)
CREAT SERPL-MCNC: 4.53 MG/DL — HIGH (ref 0.5–1.3)
GLUCOSE BLDC GLUCOMTR-MCNC: 137 MG/DL — HIGH (ref 70–99)
GLUCOSE BLDC GLUCOMTR-MCNC: 141 MG/DL — HIGH (ref 70–99)
GLUCOSE BLDC GLUCOMTR-MCNC: 242 MG/DL — HIGH (ref 70–99)
GLUCOSE BLDC GLUCOMTR-MCNC: 321 MG/DL — HIGH (ref 70–99)
GLUCOSE BLDC GLUCOMTR-MCNC: 380 MG/DL — HIGH (ref 70–99)
GLUCOSE SERPL-MCNC: 208 MG/DL — HIGH (ref 70–99)
POTASSIUM SERPL-MCNC: 3.7 MMOL/L — SIGNIFICANT CHANGE UP (ref 3.5–5.3)
POTASSIUM SERPL-SCNC: 3.7 MMOL/L — SIGNIFICANT CHANGE UP (ref 3.5–5.3)
SODIUM SERPL-SCNC: 134 MMOL/L — LOW (ref 135–145)

## 2019-09-28 PROCEDURE — 99232 SBSQ HOSP IP/OBS MODERATE 35: CPT | Mod: GC

## 2019-09-28 PROCEDURE — 99232 SBSQ HOSP IP/OBS MODERATE 35: CPT

## 2019-09-28 RX ORDER — ERYTHROPOIETIN 10000 [IU]/ML
10000 INJECTION, SOLUTION INTRAVENOUS; SUBCUTANEOUS ONCE
Refills: 0 | Status: COMPLETED | OUTPATIENT
Start: 2019-09-28 | End: 2019-09-28

## 2019-09-28 RX ORDER — INSULIN GLARGINE 100 [IU]/ML
11 INJECTION, SOLUTION SUBCUTANEOUS AT BEDTIME
Refills: 0 | Status: DISCONTINUED | OUTPATIENT
Start: 2019-09-28 | End: 2019-09-29

## 2019-09-28 RX ADMIN — Medication 3 MILLILITER(S): at 12:01

## 2019-09-28 RX ADMIN — Medication 5: at 08:11

## 2019-09-28 RX ADMIN — Medication 6 UNIT(S): at 08:12

## 2019-09-28 RX ADMIN — MUPIROCIN 1 APPLICATION(S): 20 OINTMENT TOPICAL at 05:06

## 2019-09-28 RX ADMIN — ISOSORBIDE DINITRATE 10 MILLIGRAM(S): 5 TABLET ORAL at 13:24

## 2019-09-28 RX ADMIN — OXYCODONE AND ACETAMINOPHEN 1 TABLET(S): 5; 325 TABLET ORAL at 10:36

## 2019-09-28 RX ADMIN — CLOPIDOGREL BISULFATE 75 MILLIGRAM(S): 75 TABLET, FILM COATED ORAL at 12:01

## 2019-09-28 RX ADMIN — CHLORHEXIDINE GLUCONATE 1 APPLICATION(S): 213 SOLUTION TOPICAL at 05:06

## 2019-09-28 RX ADMIN — Medication 6 UNIT(S): at 16:57

## 2019-09-28 RX ADMIN — Medication 0.5 MILLIGRAM(S): at 05:07

## 2019-09-28 RX ADMIN — SEVELAMER CARBONATE 800 MILLIGRAM(S): 2400 POWDER, FOR SUSPENSION ORAL at 16:57

## 2019-09-28 RX ADMIN — ISOSORBIDE DINITRATE 10 MILLIGRAM(S): 5 TABLET ORAL at 22:34

## 2019-09-28 RX ADMIN — Medication 3 MILLILITER(S): at 17:01

## 2019-09-28 RX ADMIN — Medication 0.5 MILLIGRAM(S): at 17:01

## 2019-09-28 RX ADMIN — ATORVASTATIN CALCIUM 40 MILLIGRAM(S): 80 TABLET, FILM COATED ORAL at 22:34

## 2019-09-28 RX ADMIN — Medication 25 MILLIGRAM(S): at 05:06

## 2019-09-28 RX ADMIN — Medication 81 MILLIGRAM(S): at 12:02

## 2019-09-28 RX ADMIN — SEVELAMER CARBONATE 800 MILLIGRAM(S): 2400 POWDER, FOR SUSPENSION ORAL at 12:01

## 2019-09-28 RX ADMIN — SEVELAMER CARBONATE 800 MILLIGRAM(S): 2400 POWDER, FOR SUSPENSION ORAL at 08:12

## 2019-09-28 RX ADMIN — Medication 3 MILLILITER(S): at 05:06

## 2019-09-28 RX ADMIN — Medication 2: at 12:01

## 2019-09-28 RX ADMIN — ERYTHROPOIETIN 10000 UNIT(S): 10000 INJECTION, SOLUTION INTRAVENOUS; SUBCUTANEOUS at 19:09

## 2019-09-28 RX ADMIN — MUPIROCIN 1 APPLICATION(S): 20 OINTMENT TOPICAL at 17:00

## 2019-09-28 RX ADMIN — OXYCODONE AND ACETAMINOPHEN 1 TABLET(S): 5; 325 TABLET ORAL at 09:36

## 2019-09-28 RX ADMIN — INSULIN GLARGINE 11 UNIT(S): 100 INJECTION, SOLUTION SUBCUTANEOUS at 21:50

## 2019-09-28 RX ADMIN — Medication 3 UNIT(S): at 22:29

## 2019-09-28 RX ADMIN — Medication 3 MILLILITER(S): at 23:25

## 2019-09-28 RX ADMIN — Medication 6 UNIT(S): at 12:01

## 2019-09-28 RX ADMIN — Medication 50 MILLIGRAM(S): at 05:06

## 2019-09-28 RX ADMIN — Medication 1 TABLET(S): at 12:01

## 2019-09-28 RX ADMIN — PANTOPRAZOLE SODIUM 40 MILLIGRAM(S): 20 TABLET, DELAYED RELEASE ORAL at 05:06

## 2019-09-28 RX ADMIN — OXYCODONE AND ACETAMINOPHEN 1 TABLET(S): 5; 325 TABLET ORAL at 22:34

## 2019-09-28 RX ADMIN — ISOSORBIDE DINITRATE 10 MILLIGRAM(S): 5 TABLET ORAL at 05:06

## 2019-09-28 RX ADMIN — OXYCODONE AND ACETAMINOPHEN 1 TABLET(S): 5; 325 TABLET ORAL at 23:25

## 2019-09-28 RX ADMIN — Medication 2: at 02:23

## 2019-09-28 NOTE — PROGRESS NOTE ADULT - SUBJECTIVE AND OBJECTIVE BOX
[facility]   [phone]  DIALYSIS NOTE  Chief Complaint: ESRD/Ongoing hemodialysis requirement.     24 hour events/subjective:      states cough is better. sob is improving       ALLERGIES & MEDICATIONS  --------------------------------------------------------------------------------  Allergies    No Known Allergies    Intolerances      Standing Inpatient Medications  ALBUTerol/ipratropium for Nebulization 3 milliLiter(s) Nebulizer every 6 hours  aspirin enteric coated 81 milliGRAM(s) Oral daily  atorvastatin 40 milliGRAM(s) Oral at bedtime  buDESOnide    Inhalation Suspension 0.5 milliGRAM(s) Inhalation two times a day  chlorhexidine 2% Cloths 1 Application(s) Topical <User Schedule>  clopidogrel Tablet 75 milliGRAM(s) Oral daily  dextrose 5%. 1000 milliLiter(s) IV Continuous <Continuous>  dextrose 50% Injectable 12.5 Gram(s) IV Push once  dextrose 50% Injectable 25 Gram(s) IV Push once  dextrose 50% Injectable 25 Gram(s) IV Push once  docusate sodium 100 milliGRAM(s) Oral three times a day  epoetin azalea Injectable 73015 Unit(s) IV Push once  heparin  Injectable 5000 Unit(s) SubCutaneous two times a day  hydrALAZINE 25 milliGRAM(s) Oral three times a day  insulin glargine Injectable (LANTUS) 9 Unit(s) SubCutaneous at bedtime  insulin lispro (HumaLOG) corrective regimen sliding scale   SubCutaneous three times a day before meals  insulin lispro (HumaLOG) corrective regimen sliding scale   SubCutaneous at bedtime  insulin lispro (HumaLOG) corrective regimen sliding scale   SubCutaneous <User Schedule>  insulin lispro Injectable (HumaLOG) 6 Unit(s) SubCutaneous three times a day before meals  insulin lispro Injectable (HumaLOG) 3 Unit(s) SubCutaneous at bedtime  isosorbide   dinitrate Tablet (ISORDIL) 10 milliGRAM(s) Oral three times a day  metoprolol succinate ER 50 milliGRAM(s) Oral daily  mupirocin 2% Ointment 1 Application(s) Both Nostrils two times a day  Nephro-raphael 1 Tablet(s) Oral daily  pantoprazole    Tablet 40 milliGRAM(s) Oral before breakfast  sevelamer carbonate 800 milliGRAM(s) Oral three times a day with meals  sodium chloride 0.9%. 500 milliLiter(s) IV Continuous <Continuous>    PRN Inpatient Medications  dextrose 40% Gel 15 Gram(s) Oral once PRN  glucagon  Injectable 1 milliGRAM(s) IntraMuscular once PRN  oxyCODONE    5 mG/acetaminophen 325 mG 1 Tablet(s) Oral every 6 hours PRN  senna 2 Tablet(s) Oral at bedtime PRN      REVIEW OF SYSTEMS  --------------------------------------------------------------------------------  no itching or rash  no fever or chill  no cp or palp   no sob or cough   no N/V/D/ no abd pain   ext no edema         VITALS/PHYSICAL EXAM  --------------------------------------------------------------------------------  T(C): 36.6 (09-28-19 @ 11:24), Max: 36.9 (09-28-19 @ 04:19)  HR: 83 (09-28-19 @ 13:22) (75 - 86)  BP: 112/74 (09-28-19 @ 13:22) (102/60 - 138/60)  RR: 18 (09-28-19 @ 11:24) (18 - 18)  SpO2: 98% (09-28-19 @ 11:24) (94% - 100%)  Wt(kg): --        09-27-19 @ 07:01  -  09-28-19 @ 07:00  --------------------------------------------------------  IN: 720 mL / OUT: 1200 mL / NET: -480 mL    09-28-19 @ 07:01  -  09-28-19 @ 16:33  --------------------------------------------------------  IN: 720 mL / OUT: 500 mL / NET: 220 mL      Physical Exam:  		  Gen: chronically  ill appearing   	no  JVD  	Pulm: decrease bs no rales  +   wheezing   	CV: RRR, S1S2; no rub  	Abd: +BS, soft, nontender/nondistended  	: No suprapubic tenderness  	UE: Warm, no cyanosis  no clubbing,  no edema  	LE: Warm, no cyanosis  no clubbing, 1+ pitting LLE > RLE  edema 1+   	Neuro: alert and oriented   	Vascular access: + avf + bruit and thrill 	  	    LABS/STUDIES  --------------------------------------------------------------------------------              9.6    9.98  >-----------<  265      [09-27-19 @ 07:45]              31.5     134  |  92  |  26  ----------------------------<  208      [09-28-19 @ 11:09]  3.7   |  26  |  4.53        Ca     10.1     [09-28-19 @ 11:09]      Mg     2.2     [09-27-19 @ 06:33]                    imp/suggest: ESRD      Hemodialysis Prescription:  	Access:  	Dialyzer: revaclear   	Blood Flow (mL/Min): 400  	Dialysate Flow (mL/Min): 600  	Target UF (Liters):  	Treatment Time:  	Potassium:   	Calcium: 2.5  	  YOLANDA    Vitamin D     continue with hd   see hd flow sheet

## 2019-09-28 NOTE — PROGRESS NOTE ADULT - ASSESSMENT
· Assessment		  63 yo F with ESRD (on HD M/Tu/Th/Sa), type 1 DM w/DKA in past, CAD, HFrEF (EF 50% on TTE from 10/18), TIA, and PVD, very recent admission here until yesterday for COPD exacerbation felt 2/2 viral illness from enterovirus now presents with worsening dyspnea and hypoxemia down to 80s (O2 sat 82% on intial ED evaluation per ED provider note).       Pneumonia of left lower lobe due to infectious organism.   - Concern for HCAP given multiple recent hospitalizations in setting of known viral URI.  O2 supplemental PRN.  ID follow. Observe off antibiotics.  Pulmonary follow     Chronic obstructive pulmonary disease, unspecified COPD type.   Duonebs q6h ATC  budesonide nebulizers q12h    EKG abnormalities.   Findings of lateral TWI on EKG. May be related to hypoxemia exacerbating underlying CAD. Pt with chronically elevated troponin T.  Cardiology follow Dr. 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   Lantus 9 units qhs  Humalog 3 units TID AC  ISS.   Endocrine follow noted    Chronic congestive heart failure, unspecified heart failure type.  Appears euvolemic at this time  Volume management via HD while inpatient, did receive HD already today  renal consult for HD.     Coronary artery disease involving native coronary artery of native heart without angina pectoris.   atorvastatin  Aspirin  Plavix    ESRD  HD  Nephrology follow Dr. Schmidt    Left leg pain  - vascular evaluation noted  - no intervention  - PVR with open graft.    DCP home if stable.    d/w patient   Pierce Viera MD pager [phone]

## 2019-09-28 NOTE — PROGRESS NOTE ADULT - ASSESSMENT
63 y/o F w/ PMHx ESRD (HD M/T/T/Sat), IDDM, CHF, CAD, ischemic cardiomyopathy presents to the ED BIBA for fever. pt also in DKA, hyperglycemia and  with hyperkalemia as well as DKA and pneumonia     1- esrd  2- CHF  3- DKA hx   4- HTN   5- chf  6- shpt      hd f 160 3 hours 1-1.5 liter 2 k bfr 400 dfr 600   chf improving   cont steroids  cont insulin hyperglycemia  hydralazine 25 mg tid   renvela one tab tid with meals

## 2019-09-28 NOTE — PROGRESS NOTE ADULT - SUBJECTIVE AND OBJECTIVE BOX
Patient is a 64y old  Female who presents with a chief complaint of Hypoxemia 2/2 healthcare associated pneumonia (28 Sep 2019 10:45)      SUBJECTIVE / OVERNIGHT EVENTS: feels better. Ambulating in hallway.  Review of Systems  chest pain no  palpitations no  sob no  nausea no  headache no    MEDICATIONS  (STANDING):  ALBUTerol/ipratropium for Nebulization 3 milliLiter(s) Nebulizer every 6 hours  aspirin enteric coated 81 milliGRAM(s) Oral daily  atorvastatin 40 milliGRAM(s) Oral at bedtime  buDESOnide    Inhalation Suspension 0.5 milliGRAM(s) Inhalation two times a day  chlorhexidine 2% Cloths 1 Application(s) Topical <User Schedule>  clopidogrel Tablet 75 milliGRAM(s) Oral daily  dextrose 5%. 1000 milliLiter(s) (50 mL/Hr) IV Continuous <Continuous>  dextrose 50% Injectable 12.5 Gram(s) IV Push once  dextrose 50% Injectable 25 Gram(s) IV Push once  dextrose 50% Injectable 25 Gram(s) IV Push once  docusate sodium 100 milliGRAM(s) Oral three times a day  epoetin azalea Injectable 03196 Unit(s) IV Push once  heparin  Injectable 5000 Unit(s) SubCutaneous two times a day  hydrALAZINE 25 milliGRAM(s) Oral three times a day  insulin glargine Injectable (LANTUS) 9 Unit(s) SubCutaneous at bedtime  insulin lispro (HumaLOG) corrective regimen sliding scale   SubCutaneous three times a day before meals  insulin lispro (HumaLOG) corrective regimen sliding scale   SubCutaneous at bedtime  insulin lispro (HumaLOG) corrective regimen sliding scale   SubCutaneous <User Schedule>  insulin lispro Injectable (HumaLOG) 6 Unit(s) SubCutaneous three times a day before meals  insulin lispro Injectable (HumaLOG) 3 Unit(s) SubCutaneous at bedtime  isosorbide   dinitrate Tablet (ISORDIL) 10 milliGRAM(s) Oral three times a day  metoprolol succinate ER 50 milliGRAM(s) Oral daily  mupirocin 2% Ointment 1 Application(s) Both Nostrils two times a day  Nephro-raphael 1 Tablet(s) Oral daily  pantoprazole    Tablet 40 milliGRAM(s) Oral before breakfast  sevelamer carbonate 800 milliGRAM(s) Oral three times a day with meals  sodium chloride 0.9%. 500 milliLiter(s) (40 mL/Hr) IV Continuous <Continuous>    MEDICATIONS  (PRN):  dextrose 40% Gel 15 Gram(s) Oral once PRN Blood Glucose LESS THAN 70 milliGRAM(s)/deciliter  glucagon  Injectable 1 milliGRAM(s) IntraMuscular once PRN Glucose LESS THAN 70 milligrams/deciliter  oxyCODONE    5 mG/acetaminophen 325 mG 1 Tablet(s) Oral every 6 hours PRN Severe Pain (7 - 10)  senna 2 Tablet(s) Oral at bedtime PRN Constipation      Vital Signs Last 24 Hrs  T(C): 36.6 (28 Sep 2019 11:24), Max: 36.9 (28 Sep 2019 04:19)  T(F): 97.9 (28 Sep 2019 11:24), Max: 98.4 (28 Sep 2019 04:19)  HR: 83 (28 Sep 2019 13:22) (75 - 86)  BP: 112/74 (28 Sep 2019 13:22) (102/60 - 138/60)  BP(mean): --  RR: 18 (28 Sep 2019 11:24) (18 - 18)  SpO2: 98% (28 Sep 2019 11:24) (94% - 100%)    PHYSICAL EXAM:  GENERAL: NAD, well-developed  HEAD:  Atraumatic, Normocephalic  EYES: EOMI, PERRLA, conjunctiva and sclera clear  NECK: Supple, No JVD  CHEST/LUNG: few rhonchi to auscultation bilaterally; No wheeze  HEART: Regular rate and rhythm; No murmurs, rubs, or gallops  ABDOMEN: Soft, Nontender, Nondistended; Bowel sounds present  EXTREMITIES:  2+ Peripheral Pulses, No clubbing, cyanosis, or edema  PSYCH: anxious  NEUROLOGY: non-focal  SKIN: No rashes or lesions    LABS:                        9.6    9.98  )-----------( 265      ( 27 Sep 2019 07:45 )             31.5     09-28    134<L>  |  92<L>  |  26<H>  ----------------------------<  208<H>  3.7   |  26  |  4.53<H>    Ca    10.1      28 Sep 2019 11:09  Mg     2.2     09-27                  RADIOLOGY & ADDITIONAL TESTS:    Imaging Personally Reviewed:    Consultant(s) Notes Reviewed:      Care Discussed with Consultants/Other Providers:

## 2019-09-28 NOTE — PROGRESS NOTE ADULT - PROBLEM SELECTOR PLAN 1
-test BG AC/HS/2AM  -Increase Lantus dose to 11 units QHS as patient still hyperglycemic in 200s, anticipate decreasing lantus to 9 units tomorrow evening   -Continue Humalog 6 units AC meals   -Continue Humalog 3 units w/bedtime snack  -Continue low Humalog correction scales premeal and qhs

## 2019-09-28 NOTE — PROGRESS NOTE ADULT - SUBJECTIVE AND OBJECTIVE BOX
Chief Complaint: hyperglycemia induced steroids     History: Patient seen and examined at bedside. States she feels very tired. Reports she is tolerating diet. Does not report outside food. Cheeze doodles and soda noted at the side of the bed. Reports eating cookies this morning. Last does of prednisone given 9/27/19 however FS remain elevated in 200s.    MEDICATIONS  (STANDING):  ALBUTerol/ipratropium for Nebulization 3 milliLiter(s) Nebulizer every 6 hours  aspirin enteric coated 81 milliGRAM(s) Oral daily  atorvastatin 40 milliGRAM(s) Oral at bedtime  buDESOnide    Inhalation Suspension 0.5 milliGRAM(s) Inhalation two times a day  chlorhexidine 2% Cloths 1 Application(s) Topical <User Schedule>  clopidogrel Tablet 75 milliGRAM(s) Oral daily  dextrose 5%. 1000 milliLiter(s) (50 mL/Hr) IV Continuous <Continuous>  dextrose 50% Injectable 12.5 Gram(s) IV Push once  dextrose 50% Injectable 25 Gram(s) IV Push once  dextrose 50% Injectable 25 Gram(s) IV Push once  docusate sodium 100 milliGRAM(s) Oral three times a day  epoetin azalae Injectable 11488 Unit(s) IV Push once  heparin  Injectable 5000 Unit(s) SubCutaneous two times a day  hydrALAZINE 25 milliGRAM(s) Oral three times a day  insulin glargine Injectable (LANTUS) 9 Unit(s) SubCutaneous at bedtime  insulin lispro (HumaLOG) corrective regimen sliding scale   SubCutaneous three times a day before meals  insulin lispro (HumaLOG) corrective regimen sliding scale   SubCutaneous at bedtime  insulin lispro (HumaLOG) corrective regimen sliding scale   SubCutaneous <User Schedule>  insulin lispro Injectable (HumaLOG) 6 Unit(s) SubCutaneous three times a day before meals  insulin lispro Injectable (HumaLOG) 3 Unit(s) SubCutaneous at bedtime  isosorbide   dinitrate Tablet (ISORDIL) 10 milliGRAM(s) Oral three times a day  metoprolol succinate ER 50 milliGRAM(s) Oral daily  mupirocin 2% Ointment 1 Application(s) Both Nostrils two times a day  Nephro-raphael 1 Tablet(s) Oral daily  pantoprazole    Tablet 40 milliGRAM(s) Oral before breakfast  sevelamer carbonate 800 milliGRAM(s) Oral three times a day with meals  sodium chloride 0.9%. 500 milliLiter(s) (40 mL/Hr) IV Continuous <Continuous>    MEDICATIONS  (PRN):  dextrose 40% Gel 15 Gram(s) Oral once PRN Blood Glucose LESS THAN 70 milliGRAM(s)/deciliter  glucagon  Injectable 1 milliGRAM(s) IntraMuscular once PRN Glucose LESS THAN 70 milligrams/deciliter  oxyCODONE    5 mG/acetaminophen 325 mG 1 Tablet(s) Oral every 6 hours PRN Severe Pain (7 - 10)  senna 2 Tablet(s) Oral at bedtime PRN Constipation    PHYSICAL EXAM:  VITALS: T(C): 36.6 (09-28-19 @ 11:24)  T(F): 97.9 (09-28-19 @ 11:24), Max: 98.4 (09-28-19 @ 04:19)  HR: 83 (09-28-19 @ 13:22) (75 - 86)  BP: 112/74 (09-28-19 @ 13:22) (102/60 - 138/60)  RR:  (18 - 18)  SpO2:  (94% - 100%)  Wt(kg): 72kg  GENERAL: NAD, well-groomed, well-developed  RESPIRATORY: Clear to auscultation bilaterally; No rales, rhonchi, wheezing, or rubs  CARDIOVASCULAR: Regular rate and rhythm; No murmurs; no peripheral edema  PSYCH: Alert and oriented x 3, reactive affect    POCT Blood Glucose.: 242 mg/dL (09-28-19 @ 11:43)  H6+2  POCT Blood Glucose.: 380 mg/dL (09-28-19 @ 08:06) H 6+5  POCT Blood Glucose.: 321 mg/dL (09-28-19 @ 02:13)   POCT Blood Glucose.: 136 mg/dL (09-27-19 @ 21:37)L11 H3  POCT Blood Glucose.: 256 mg/dL (09-27-19 @ 17:12) H10+6  POCT Blood Glucose.: 248 mg/dL (09-27-19 @ 11:58) H10+4  POCT Blood Glucose.: 317 mg/dL (09-27-19 @ 08:04)  POCT Blood Glucose.: 383 mg/dL (09-27-19 @ 02:02)  POCT Blood Glucose.: 285 mg/dL (09-26-19 @ 21:26)  POCT Blood Glucose.: 258 mg/dL (09-26-19 @ 16:48)  POCT Blood Glucose.: 269 mg/dL (09-26-19 @ 11:39)  POCT Blood Glucose.: 516 mg/dL (09-26-19 @ 07:30)  POCT Blood Glucose.: 557 mg/dL (09-26-19 @ 07:29)  POCT Blood Glucose.: 337 mg/dL (09-25-19 @ 22:22)  POCT Blood Glucose.: 199 mg/dL (09-25-19 @ 18:29)      09-28    134<L>  |  92<L>  |  26<H>  ----------------------------<  208<H>  3.7   |  26  |  4.53<H>    EGFR if : 11<L>  EGFR if non : 10<L>    Ca    10.1      09-28  Mg     2.2     09-27    Hemoglobin A1C, Whole Blood: 8.1 % <H> [4.0 - 5.6] (09-16-19 @ 08:04)

## 2019-09-28 NOTE — PROGRESS NOTE ADULT - SUBJECTIVE AND OBJECTIVE BOX
Cardiovascular Disease Progress Note    Overnight events: No acute events overnight. currently at hd. remains in nsr   Otherwise review of systems negative    Objective Findings:  T(C): 36.7 (19 @ 09:34), Max: 36.9 (19 @ 04:19)  HR: 84 (19 @ 09:34) (75 - 84)  BP: 123/68 (19 @ 09:34) (115/70 - 138/60)  RR: 18 (19 @ 09:34) (18 - 18)  SpO2: 98% (19 @ 09:34) (94% - 100%)  Wt(kg): --  Daily     Daily Weight in k.8 (27 Sep 2019 21:05)      Physical Exam:  Gen: NAD  HEENT: EOMI  CV: RRR, normal S1 + S2, 2/6 heraclio  Lungs: CTAB  Abd: soft, non-tender  Ext: No edema    Telemetry: nsr    Laboratory Data:                        9.6    9.98  )-----------( 265      ( 27 Sep 2019 07:45 )             31.5         131<L>  |  91<L>  |  37<H>  ----------------------------<  311<H>  4.9   |  20<L>  |  4.79<H>    Ca    9.5      27 Sep 2019 06:33  Mg     2.2                     Inpatient Medications:  MEDICATIONS  (STANDING):  ALBUTerol/ipratropium for Nebulization 3 milliLiter(s) Nebulizer every 6 hours  aspirin enteric coated 81 milliGRAM(s) Oral daily  atorvastatin 40 milliGRAM(s) Oral at bedtime  buDESOnide    Inhalation Suspension 0.5 milliGRAM(s) Inhalation two times a day  chlorhexidine 2% Cloths 1 Application(s) Topical <User Schedule>  clopidogrel Tablet 75 milliGRAM(s) Oral daily  dextrose 5%. 1000 milliLiter(s) (50 mL/Hr) IV Continuous <Continuous>  dextrose 50% Injectable 12.5 Gram(s) IV Push once  dextrose 50% Injectable 25 Gram(s) IV Push once  dextrose 50% Injectable 25 Gram(s) IV Push once  docusate sodium 100 milliGRAM(s) Oral three times a day  epoetin azalea Injectable 63808 Unit(s) IV Push once  heparin  Injectable 5000 Unit(s) SubCutaneous two times a day  hydrALAZINE 25 milliGRAM(s) Oral three times a day  insulin glargine Injectable (LANTUS) 9 Unit(s) SubCutaneous at bedtime  insulin lispro (HumaLOG) corrective regimen sliding scale   SubCutaneous three times a day before meals  insulin lispro (HumaLOG) corrective regimen sliding scale   SubCutaneous at bedtime  insulin lispro (HumaLOG) corrective regimen sliding scale   SubCutaneous <User Schedule>  insulin lispro Injectable (HumaLOG) 6 Unit(s) SubCutaneous three times a day before meals  insulin lispro Injectable (HumaLOG) 3 Unit(s) SubCutaneous at bedtime  isosorbide   dinitrate Tablet (ISORDIL) 10 milliGRAM(s) Oral three times a day  metoprolol succinate ER 50 milliGRAM(s) Oral daily  mupirocin 2% Ointment 1 Application(s) Both Nostrils two times a day  Nephro-raphael 1 Tablet(s) Oral daily  pantoprazole    Tablet 40 milliGRAM(s) Oral before breakfast  sevelamer carbonate 800 milliGRAM(s) Oral three times a day with meals  sodium chloride 0.9%. 500 milliLiter(s) (40 mL/Hr) IV Continuous <Continuous>      Assessment:  -SOB  -pAT  -HCAP - recent admission for viral URI/RVP +  -elevated but stable cardiac enzymes (hs trop) with recent admission with relatively preserved ck/ck mb fraction and no obvious ischemic changes on ekg  -chest pain with mild ekg changes and stable hs trop  -NSVT  -pAT  -volume overload  -ischemic cardiomyopathy, cad s/p multiple stents  -esrd on hd  -PAD s/p prior intervention         Recs:  -abx for HCAP per ID  -f/u pulm  -known extensive CAD and ICM. s/p C 2019 --> severe and diffuse instent restenosis along RCA --> unable to stent due to multiple layers of stenting and prior brachytherapy  -will consider complex intervention if true ischemic and refractory sx develop   -c/w beta blockers for nsvt/pat/cad (as above). currently on toprol 50mg, isordil 10mg tid, hydral 25mg tid. uptitrate GDMT as tolerates. wouldnt inc bb at this time 2 bronchospasm  -brief episodes of pAT, less likely pAF. c/w tele monitoring and toprol. hold AC for now  -hx of prolonged qtc. avoid qtc prolonging meds  -s/p TTE 2019: mod-severe MR, pseudo AS (low flow-low gradient), mod-severe LV dysfunction --> recent CTS consult with dr hebert appreciated. plan is for medical management given surgical risk and patient preference  -c/w asa, plavix, statin for ischemic cardiomyopathy/CAD/PAD  -dvt ppx        Over 25 minutes spent on total encounter; more than 50% of the visit was spent counseling and/or coordinating care by the attending physician.      Julián Biswas MD   Cardiovascular Disease  [phone]

## 2019-09-28 NOTE — PROGRESS NOTE ADULT - ASSESSMENT
65y/o M w/h/o uncontrolled TIDM (HbA1c 8.1% 9/19) c/b neuropathy and retinopathy as well as CAD s/p multiple stents, CHF (EF 50% 10/2018), TIA, PVD s/p b/l fem-pop bypass, ESRD> HD. Recent admission with COPD exacerbation felt 2/2 viral illness from enterovirus now presents with worsening dyspnea and hypoxemia. Now treated for PNA > on antibiotic > on steroids. Pt remains hyperglycemic but with BG improved. Last dose of Prednisone was yesterday, 9/27/19, FS remain elevated in 200s, anticipate FS will decrease as steroid effect wears off.

## 2019-09-28 NOTE — PROGRESS NOTE ADULT - SUBJECTIVE AND OBJECTIVE BOX
PULMONARY PROGRESS NOTE    NATHAN MEEKS  MRN-[id number]    Patient is a 64y old  Female who presents with a chief complaint of Hypoxemia 2/2 healthcare associated pneumonia (27 Sep 2019 20:26)      HPI:  back on 02  denies dyspnea    ROS:   -    ACTIVE MEDICATION LIST:  MEDICATIONS  (STANDING):  ALBUTerol/ipratropium for Nebulization 3 milliLiter(s) Nebulizer every 6 hours  aspirin enteric coated 81 milliGRAM(s) Oral daily  atorvastatin 40 milliGRAM(s) Oral at bedtime  buDESOnide    Inhalation Suspension 0.5 milliGRAM(s) Inhalation two times a day  chlorhexidine 2% Cloths 1 Application(s) Topical <User Schedule>  clopidogrel Tablet 75 milliGRAM(s) Oral daily  dextrose 5%. 1000 milliLiter(s) (50 mL/Hr) IV Continuous <Continuous>  dextrose 50% Injectable 12.5 Gram(s) IV Push once  dextrose 50% Injectable 25 Gram(s) IV Push once  dextrose 50% Injectable 25 Gram(s) IV Push once  docusate sodium 100 milliGRAM(s) Oral three times a day  epoetin azalea Injectable 18086 Unit(s) IV Push once  heparin  Injectable 5000 Unit(s) SubCutaneous two times a day  hydrALAZINE 25 milliGRAM(s) Oral three times a day  insulin glargine Injectable (LANTUS) 9 Unit(s) SubCutaneous at bedtime  insulin lispro (HumaLOG) corrective regimen sliding scale   SubCutaneous three times a day before meals  insulin lispro (HumaLOG) corrective regimen sliding scale   SubCutaneous at bedtime  insulin lispro (HumaLOG) corrective regimen sliding scale   SubCutaneous <User Schedule>  insulin lispro Injectable (HumaLOG) 6 Unit(s) SubCutaneous three times a day before meals  insulin lispro Injectable (HumaLOG) 3 Unit(s) SubCutaneous at bedtime  isosorbide   dinitrate Tablet (ISORDIL) 10 milliGRAM(s) Oral three times a day  metoprolol succinate ER 50 milliGRAM(s) Oral daily  mupirocin 2% Ointment 1 Application(s) Both Nostrils two times a day  Nephro-raphael 1 Tablet(s) Oral daily  pantoprazole    Tablet 40 milliGRAM(s) Oral before breakfast  sevelamer carbonate 800 milliGRAM(s) Oral three times a day with meals  sodium chloride 0.9%. 500 milliLiter(s) (40 mL/Hr) IV Continuous <Continuous>    MEDICATIONS  (PRN):  dextrose 40% Gel 15 Gram(s) Oral once PRN Blood Glucose LESS THAN 70 milliGRAM(s)/deciliter  glucagon  Injectable 1 milliGRAM(s) IntraMuscular once PRN Glucose LESS THAN 70 milligrams/deciliter  oxyCODONE    5 mG/acetaminophen 325 mG 1 Tablet(s) Oral every 6 hours PRN Severe Pain (7 - 10)  senna 2 Tablet(s) Oral at bedtime PRN Constipation      EXAM:  Vital Signs Last 24 Hrs  T(C): 36.7 (28 Sep 2019 09:34), Max: 36.9 (28 Sep 2019 04:19)  T(F): 98 (28 Sep 2019 09:34), Max: 98.4 (28 Sep 2019 04:19)  HR: 84 (28 Sep 2019 09:34) (75 - 84)  BP: 123/68 (28 Sep 2019 09:34) (115/70 - 138/60)  BP(mean): --  RR: 18 (28 Sep 2019 09:34) (18 - 18)  SpO2: 98% (28 Sep 2019 09:34) (94% - 100%)    GENERAL: The patient is awake and alert in no apparent distress.     LUNGS: Clear to auscultation without wheezing, rales or rhonchi; respirations unlabored    HEART: Regular rate and rhythm without murmur.                            9.6    9.98  )-----------( 265      ( 27 Sep 2019 07:45 )             31.5       09-27    131<L>  |  91<L>  |  37<H>  ----------------------------<  311<H>  4.9   |  20<L>  |  4.79<H>    Ca    9.5      27 Sep 2019 06:33  Mg     2.2     09-27    < from: CT Angio Chest w/ IV Cont (09.21.19 @ 18:27) >    EXAM:  CT ANGIO CHEST (W)AW IC                            PROCEDURE DATE:  09/21/2019            INTERPRETATION:  CLINICAL INFORMATION: Chest pain. Evaluate for pulmonary   embolism    COMPARISON: CT chest 4/18/2019, 10/2/2017.    PROCEDURE:   CT Angiography of the Chest.  68 ml of Omnipaque 350 was injected intravenously. 32 ml were discarded.  Sagittal and coronal reformats were performed as well as 3D (MIP)   reconstructions.    FINDINGS:    LUNGS AND AIRWAYS: Patent central airways. Branching "tree-in-bud"   airspace opacities in bilateral lower lobes, right middle lobe and   lingula. 1.3 cm right upper lobe focal groundglass airspace opacity   (series 3, image 109) is not significantly changed. 0.8 cm focal   groundglass versus opacityin the right upper lobe (series 3, image 166)   is not significantly changed. 1.2 cm focal groundglass airspace opacity   in the left upper lobe (series 3, image 92) is not significantly changed.   Bilateral lower lobe subsegmental atelectasis. Biapical scarring. Right   apical blebs. Centrilobular emphysema most prominent in the upper lobes.    PLEURA: No pleural effusion.    MEDIASTINUM AND SANDOVAL: No lymphadenopathy.    VESSELS: No pulmonary embolism. Atherosclerotic changes. Left subclavian   vein stent.    HEART: Heart size is enlarged. No pericardial effusion. Coronary artery,   aortic valvular and mitral annular calcifications.    CHEST WALL AND LOWER NECK: Within normal limits.    VISUALIZED UPPER ABDOMEN: Partially imaged atrophic leftkidney. Splenic   calcified granulomas.    BONES: Unchanged diffuse osseous sclerosis, again suggestive of renal   osteodystrophy.    IMPRESSION:     1. No pulmonary embolism.  2. Branching "tree-in-bud" airspace opacities in bilateral lower lobes,   right middle lobe and lingula likely infectious in etiology.  3. Focal groundglass airspace opacities in bilateral upper lobes, not   significant changed dating back to at least a CT of the chest from   10/2/2017, which could represent groundglass nodules.        < end of copied text >        PROBLEM LIST:  64y Female with HEALTH ISSUES - PROBLEM Dx:  Dyspnea: Dyspnea  ESRD (end stage renal disease): ESRD (end stage renal disease)  Coronary artery disease involving native coronary artery of native heart without angina pectoris: Coronary artery disease involving native coronary artery of native heart without angina pectoris  Chronic congestive heart failure, unspecified heart failure type: Chronic congestive heart failure, unspecified heart failure type  Type 1 diabetes mellitus with chronic kidney disease on chronic dialysis: Type 1 diabetes mellitus with chronic kidney disease on chronic dialysis  EKG abnormalities: EKG abnormalities  Chronic obstructive pulmonary disease, unspecified COPD type: Chronic obstructive pulmonary disease, unspecified COPD type  Pneumonia of left lower lobe due to infectious organism: Pneumonia of left lower lobe due to infectious organism            RECS:  supportive care for viral infection   neb Q6hrs change to PRN  continue budesonide BID  she has completed prednisone  outpatient f/u with repeat CT chest for the GGO/ lymphadenopathy - with Dr Thompson        Please call with any questions.    Chelle Kapoor DO  [facility] Pulmonary/Sleep Medicine  [phone] PULMONARY PROGRESS NOTE    NATHAN MEEKS  MRN-[id number]    Patient is a 64y old  Female who presents with a chief complaint of Hypoxemia 2/2 healthcare associated pneumonia (27 Sep 2019 20:26)      HPI:  back on 02  denies dyspnea    ROS:   -    ACTIVE MEDICATION LIST:  MEDICATIONS  (STANDING):  ALBUTerol/ipratropium for Nebulization 3 milliLiter(s) Nebulizer every 6 hours  aspirin enteric coated 81 milliGRAM(s) Oral daily  atorvastatin 40 milliGRAM(s) Oral at bedtime  buDESOnide    Inhalation Suspension 0.5 milliGRAM(s) Inhalation two times a day  chlorhexidine 2% Cloths 1 Application(s) Topical <User Schedule>  clopidogrel Tablet 75 milliGRAM(s) Oral daily  dextrose 5%. 1000 milliLiter(s) (50 mL/Hr) IV Continuous <Continuous>  dextrose 50% Injectable 12.5 Gram(s) IV Push once  dextrose 50% Injectable 25 Gram(s) IV Push once  dextrose 50% Injectable 25 Gram(s) IV Push once  docusate sodium 100 milliGRAM(s) Oral three times a day  epoetin azalea Injectable 34248 Unit(s) IV Push once  heparin  Injectable 5000 Unit(s) SubCutaneous two times a day  hydrALAZINE 25 milliGRAM(s) Oral three times a day  insulin glargine Injectable (LANTUS) 9 Unit(s) SubCutaneous at bedtime  insulin lispro (HumaLOG) corrective regimen sliding scale   SubCutaneous three times a day before meals  insulin lispro (HumaLOG) corrective regimen sliding scale   SubCutaneous at bedtime  insulin lispro (HumaLOG) corrective regimen sliding scale   SubCutaneous <User Schedule>  insulin lispro Injectable (HumaLOG) 6 Unit(s) SubCutaneous three times a day before meals  insulin lispro Injectable (HumaLOG) 3 Unit(s) SubCutaneous at bedtime  isosorbide   dinitrate Tablet (ISORDIL) 10 milliGRAM(s) Oral three times a day  metoprolol succinate ER 50 milliGRAM(s) Oral daily  mupirocin 2% Ointment 1 Application(s) Both Nostrils two times a day  Nephro-raphael 1 Tablet(s) Oral daily  pantoprazole    Tablet 40 milliGRAM(s) Oral before breakfast  sevelamer carbonate 800 milliGRAM(s) Oral three times a day with meals  sodium chloride 0.9%. 500 milliLiter(s) (40 mL/Hr) IV Continuous <Continuous>    MEDICATIONS  (PRN):  dextrose 40% Gel 15 Gram(s) Oral once PRN Blood Glucose LESS THAN 70 milliGRAM(s)/deciliter  glucagon  Injectable 1 milliGRAM(s) IntraMuscular once PRN Glucose LESS THAN 70 milligrams/deciliter  oxyCODONE    5 mG/acetaminophen 325 mG 1 Tablet(s) Oral every 6 hours PRN Severe Pain (7 - 10)  senna 2 Tablet(s) Oral at bedtime PRN Constipation      EXAM:  Vital Signs Last 24 Hrs  T(C): 36.7 (28 Sep 2019 09:34), Max: 36.9 (28 Sep 2019 04:19)  T(F): 98 (28 Sep 2019 09:34), Max: 98.4 (28 Sep 2019 04:19)  HR: 84 (28 Sep 2019 09:34) (75 - 84)  BP: 123/68 (28 Sep 2019 09:34) (115/70 - 138/60)  BP(mean): --  RR: 18 (28 Sep 2019 09:34) (18 - 18)  SpO2: 98% (28 Sep 2019 09:34) (94% - 100%)    GENERAL: The patient is awake and alert in no apparent distress.     LUNGS: Clear to auscultation without wheezing, rales or rhonchi; respirations unlabored    HEART: Regular rate and rhythm without murmur.                            9.6    9.98  )-----------( 265      ( 27 Sep 2019 07:45 )             31.5       09-27    131<L>  |  91<L>  |  37<H>  ----------------------------<  311<H>  4.9   |  20<L>  |  4.79<H>    Ca    9.5      27 Sep 2019 06:33  Mg     2.2     09-27    < from: CT Angio Chest w/ IV Cont (09.21.19 @ 18:27) >    EXAM:  CT ANGIO CHEST (W)AW IC                            PROCEDURE DATE:  09/21/2019            INTERPRETATION:  CLINICAL INFORMATION: Chest pain. Evaluate for pulmonary   embolism    COMPARISON: CT chest 4/18/2019, 10/2/2017.    PROCEDURE:   CT Angiography of the Chest.  68 ml of Omnipaque 350 was injected intravenously. 32 ml were discarded.  Sagittal and coronal reformats were performed as well as 3D (MIP)   reconstructions.    FINDINGS:    LUNGS AND AIRWAYS: Patent central airways. Branching "tree-in-bud"   airspace opacities in bilateral lower lobes, right middle lobe and   lingula. 1.3 cm right upper lobe focal groundglass airspace opacity   (series 3, image 109) is not significantly changed. 0.8 cm focal   groundglass versus opacityin the right upper lobe (series 3, image 166)   is not significantly changed. 1.2 cm focal groundglass airspace opacity   in the left upper lobe (series 3, image 92) is not significantly changed.   Bilateral lower lobe subsegmental atelectasis. Biapical scarring. Right   apical blebs. Centrilobular emphysema most prominent in the upper lobes.    PLEURA: No pleural effusion.    MEDIASTINUM AND SANDOVAL: No lymphadenopathy.    VESSELS: No pulmonary embolism. Atherosclerotic changes. Left subclavian   vein stent.    HEART: Heart size is enlarged. No pericardial effusion. Coronary artery,   aortic valvular and mitral annular calcifications.    CHEST WALL AND LOWER NECK: Within normal limits.    VISUALIZED UPPER ABDOMEN: Partially imaged atrophic leftkidney. Splenic   calcified granulomas.    BONES: Unchanged diffuse osseous sclerosis, again suggestive of renal   osteodystrophy.    IMPRESSION:     1. No pulmonary embolism.  2. Branching "tree-in-bud" airspace opacities in bilateral lower lobes,   right middle lobe and lingula likely infectious in etiology.  3. Focal groundglass airspace opacities in bilateral upper lobes, not   significant changed dating back to at least a CT of the chest from   10/2/2017, which could represent groundglass nodules.        < end of copied text >        PROBLEM LIST:  64y Female with HEALTH ISSUES - PROBLEM Dx:  Dyspnea: Dyspnea  ESRD (end stage renal disease): ESRD (end stage renal disease)  Coronary artery disease involving native coronary artery of native heart without angina pectoris: Coronary artery disease involving native coronary artery of native heart without angina pectoris  Chronic congestive heart failure, unspecified heart failure type: Chronic congestive heart failure, unspecified heart failure type  Type 1 diabetes mellitus with chronic kidney disease on chronic dialysis: Type 1 diabetes mellitus with chronic kidney disease on chronic dialysis  EKG abnormalities: EKG abnormalities  Chronic obstructive pulmonary disease, unspecified COPD type: Chronic obstructive pulmonary disease, unspecified COPD type  Pneumonia of left lower lobe due to infectious organism: Pneumonia of left lower lobe due to infectious organism            RECS:  supportive care for viral infection   neb Q6hrs change to PRN  continue budesonide BID  she has completed prednisone  outpatient f/u with repeat CT chest for the GGO/ lymphadenopathy - with Dr Thompson        Please call with any questions over the weekend    Chelle Kapoor DO  [facility] Pulmonary/Sleep Medicine  [phone]

## 2019-09-29 LAB
ANION GAP SERPL CALC-SCNC: 15 MMOL/L — SIGNIFICANT CHANGE UP (ref 5–17)
BUN SERPL-MCNC: 20 MG/DL — SIGNIFICANT CHANGE UP (ref 7–23)
CALCIUM SERPL-MCNC: 9.4 MG/DL — SIGNIFICANT CHANGE UP (ref 8.4–10.5)
CHLORIDE SERPL-SCNC: 96 MMOL/L — SIGNIFICANT CHANGE UP (ref 96–108)
CO2 SERPL-SCNC: 26 MMOL/L — SIGNIFICANT CHANGE UP (ref 22–31)
CREAT SERPL-MCNC: 3.37 MG/DL — HIGH (ref 0.5–1.3)
GLUCOSE BLDC GLUCOMTR-MCNC: 219 MG/DL — HIGH (ref 70–99)
GLUCOSE BLDC GLUCOMTR-MCNC: 228 MG/DL — HIGH (ref 70–99)
GLUCOSE BLDC GLUCOMTR-MCNC: 282 MG/DL — HIGH (ref 70–99)
GLUCOSE BLDC GLUCOMTR-MCNC: 288 MG/DL — HIGH (ref 70–99)
GLUCOSE BLDC GLUCOMTR-MCNC: 366 MG/DL — HIGH (ref 70–99)
GLUCOSE SERPL-MCNC: 309 MG/DL — HIGH (ref 70–99)
HCT VFR BLD CALC: 34.1 % — LOW (ref 34.5–45)
HGB BLD-MCNC: 10.5 G/DL — LOW (ref 11.5–15.5)
MCHC RBC-ENTMCNC: 30.8 GM/DL — LOW (ref 32–36)
MCHC RBC-ENTMCNC: 33.4 PG — SIGNIFICANT CHANGE UP (ref 27–34)
MCV RBC AUTO: 108.6 FL — HIGH (ref 80–100)
PLATELET # BLD AUTO: 312 K/UL — SIGNIFICANT CHANGE UP (ref 150–400)
POTASSIUM SERPL-MCNC: 4 MMOL/L — SIGNIFICANT CHANGE UP (ref 3.5–5.3)
POTASSIUM SERPL-SCNC: 4 MMOL/L — SIGNIFICANT CHANGE UP (ref 3.5–5.3)
RBC # BLD: 3.14 M/UL — LOW (ref 3.8–5.2)
RBC # FLD: 14.9 % — HIGH (ref 10.3–14.5)
SODIUM SERPL-SCNC: 137 MMOL/L — SIGNIFICANT CHANGE UP (ref 135–145)
WBC # BLD: 9.09 K/UL — SIGNIFICANT CHANGE UP (ref 3.8–10.5)
WBC # FLD AUTO: 9.09 K/UL — SIGNIFICANT CHANGE UP (ref 3.8–10.5)

## 2019-09-29 PROCEDURE — 99232 SBSQ HOSP IP/OBS MODERATE 35: CPT

## 2019-09-29 RX ORDER — INSULIN LISPRO 100/ML
8 VIAL (ML) SUBCUTANEOUS
Refills: 0 | Status: DISCONTINUED | OUTPATIENT
Start: 2019-09-29 | End: 2019-09-30

## 2019-09-29 RX ORDER — INSULIN GLARGINE 100 [IU]/ML
12 INJECTION, SOLUTION SUBCUTANEOUS AT BEDTIME
Refills: 0 | Status: DISCONTINUED | OUTPATIENT
Start: 2019-09-29 | End: 2019-09-30

## 2019-09-29 RX ADMIN — SEVELAMER CARBONATE 800 MILLIGRAM(S): 2400 POWDER, FOR SUSPENSION ORAL at 12:17

## 2019-09-29 RX ADMIN — MUPIROCIN 1 APPLICATION(S): 20 OINTMENT TOPICAL at 17:09

## 2019-09-29 RX ADMIN — Medication 0.5 MILLIGRAM(S): at 05:55

## 2019-09-29 RX ADMIN — Medication 81 MILLIGRAM(S): at 12:18

## 2019-09-29 RX ADMIN — Medication 6 UNIT(S): at 12:18

## 2019-09-29 RX ADMIN — Medication 3 MILLILITER(S): at 23:36

## 2019-09-29 RX ADMIN — Medication 3 MILLILITER(S): at 05:54

## 2019-09-29 RX ADMIN — Medication 1 TABLET(S): at 12:17

## 2019-09-29 RX ADMIN — Medication 5: at 08:25

## 2019-09-29 RX ADMIN — ATORVASTATIN CALCIUM 40 MILLIGRAM(S): 80 TABLET, FILM COATED ORAL at 21:28

## 2019-09-29 RX ADMIN — Medication 3 MILLILITER(S): at 17:07

## 2019-09-29 RX ADMIN — Medication 8 UNIT(S): at 17:07

## 2019-09-29 RX ADMIN — SEVELAMER CARBONATE 800 MILLIGRAM(S): 2400 POWDER, FOR SUSPENSION ORAL at 17:07

## 2019-09-29 RX ADMIN — CHLORHEXIDINE GLUCONATE 1 APPLICATION(S): 213 SOLUTION TOPICAL at 05:55

## 2019-09-29 RX ADMIN — Medication 0.5 MILLIGRAM(S): at 17:08

## 2019-09-29 RX ADMIN — Medication 100 MILLIGRAM(S): at 16:02

## 2019-09-29 RX ADMIN — Medication 25 MILLIGRAM(S): at 05:54

## 2019-09-29 RX ADMIN — Medication 3 MILLILITER(S): at 12:18

## 2019-09-29 RX ADMIN — Medication 6 UNIT(S): at 08:25

## 2019-09-29 RX ADMIN — INSULIN GLARGINE 12 UNIT(S): 100 INJECTION, SOLUTION SUBCUTANEOUS at 21:28

## 2019-09-29 RX ADMIN — MUPIROCIN 1 APPLICATION(S): 20 OINTMENT TOPICAL at 05:55

## 2019-09-29 RX ADMIN — ISOSORBIDE DINITRATE 10 MILLIGRAM(S): 5 TABLET ORAL at 05:54

## 2019-09-29 RX ADMIN — Medication 50 MILLIGRAM(S): at 05:54

## 2019-09-29 RX ADMIN — Medication 3: at 12:18

## 2019-09-29 RX ADMIN — OXYCODONE AND ACETAMINOPHEN 1 TABLET(S): 5; 325 TABLET ORAL at 19:01

## 2019-09-29 RX ADMIN — PANTOPRAZOLE SODIUM 40 MILLIGRAM(S): 20 TABLET, DELAYED RELEASE ORAL at 05:54

## 2019-09-29 RX ADMIN — Medication 3 UNIT(S): at 21:28

## 2019-09-29 RX ADMIN — ISOSORBIDE DINITRATE 10 MILLIGRAM(S): 5 TABLET ORAL at 21:28

## 2019-09-29 RX ADMIN — OXYCODONE AND ACETAMINOPHEN 1 TABLET(S): 5; 325 TABLET ORAL at 19:45

## 2019-09-29 RX ADMIN — ISOSORBIDE DINITRATE 10 MILLIGRAM(S): 5 TABLET ORAL at 16:01

## 2019-09-29 RX ADMIN — SEVELAMER CARBONATE 800 MILLIGRAM(S): 2400 POWDER, FOR SUSPENSION ORAL at 08:24

## 2019-09-29 RX ADMIN — Medication 3: at 17:07

## 2019-09-29 RX ADMIN — CLOPIDOGREL BISULFATE 75 MILLIGRAM(S): 75 TABLET, FILM COATED ORAL at 12:17

## 2019-09-29 NOTE — PROGRESS NOTE ADULT - ATTENDING COMMENTS
Agree with assessment and plan as above by Dr. Yin. Reviewed all pertinent labs, glucose values, and imaging studies. Modifications made as indicated above.   Patient was seen and examined independently by me as well after discussion of the case.     Alistair Rodriguez MD  [phone]
Agree with assessment and plan as above by . Reviewed all pertinent labs, glucose values, and imaging studies. Modifications made as indicated above.   Patient was seen and examined independently by me as well after discussion of the case. 63 yo F with Type 1 DM with multiple microvascular and CAD s/p multiple stents, PVD, now off steroid treatment but patient notable to have hyperglycemia, variable PO intake (sometimes with outside food). Will adjust insulin regimen slightly as above note to account for hyperglycemia.      Alistair Rodriguez MD  [phone]

## 2019-09-29 NOTE — PROGRESS NOTE ADULT - SUBJECTIVE AND OBJECTIVE BOX
Patient is a 64y old  Female who presents with a chief complaint of Hypoxemia 2/2 healthcare associated pneumonia (29 Sep 2019 15:21)      SUBJECTIVE / OVERNIGHT EVENTS: feels better.  Review of Systems  chest pain no  palpitations no  sob no  nausea no  headache no    MEDICATIONS  (STANDING):  ALBUTerol/ipratropium for Nebulization 3 milliLiter(s) Nebulizer every 6 hours  aspirin enteric coated 81 milliGRAM(s) Oral daily  atorvastatin 40 milliGRAM(s) Oral at bedtime  buDESOnide    Inhalation Suspension 0.5 milliGRAM(s) Inhalation two times a day  chlorhexidine 2% Cloths 1 Application(s) Topical <User Schedule>  clopidogrel Tablet 75 milliGRAM(s) Oral daily  dextrose 5%. 1000 milliLiter(s) (50 mL/Hr) IV Continuous <Continuous>  dextrose 50% Injectable 12.5 Gram(s) IV Push once  dextrose 50% Injectable 25 Gram(s) IV Push once  dextrose 50% Injectable 25 Gram(s) IV Push once  docusate sodium 100 milliGRAM(s) Oral three times a day  heparin  Injectable 5000 Unit(s) SubCutaneous two times a day  hydrALAZINE 25 milliGRAM(s) Oral three times a day  insulin glargine Injectable (LANTUS) 12 Unit(s) SubCutaneous at bedtime  insulin lispro (HumaLOG) corrective regimen sliding scale   SubCutaneous three times a day before meals  insulin lispro (HumaLOG) corrective regimen sliding scale   SubCutaneous at bedtime  insulin lispro (HumaLOG) corrective regimen sliding scale   SubCutaneous <User Schedule>  insulin lispro Injectable (HumaLOG) 8 Unit(s) SubCutaneous three times a day before meals  insulin lispro Injectable (HumaLOG) 3 Unit(s) SubCutaneous at bedtime  isosorbide   dinitrate Tablet (ISORDIL) 10 milliGRAM(s) Oral three times a day  metoprolol succinate ER 50 milliGRAM(s) Oral daily  mupirocin 2% Ointment 1 Application(s) Both Nostrils two times a day  Nephro-raphael 1 Tablet(s) Oral daily  pantoprazole    Tablet 40 milliGRAM(s) Oral before breakfast  sevelamer carbonate 800 milliGRAM(s) Oral three times a day with meals  sodium chloride 0.9%. 500 milliLiter(s) (40 mL/Hr) IV Continuous <Continuous>    MEDICATIONS  (PRN):  dextrose 40% Gel 15 Gram(s) Oral once PRN Blood Glucose LESS THAN 70 milliGRAM(s)/deciliter  glucagon  Injectable 1 milliGRAM(s) IntraMuscular once PRN Glucose LESS THAN 70 milligrams/deciliter  oxyCODONE    5 mG/acetaminophen 325 mG 1 Tablet(s) Oral every 6 hours PRN Severe Pain (7 - 10)  senna 2 Tablet(s) Oral at bedtime PRN Constipation      Vital Signs Last 24 Hrs  T(C): 36.4 (29 Sep 2019 11:33), Max: 36.8 (28 Sep 2019 21:34)  T(F): 97.6 (29 Sep 2019 11:33), Max: 98.2 (28 Sep 2019 21:34)  HR: 90 (29 Sep 2019 15:59) (83 - 90)  BP: 112/60 (29 Sep 2019 15:59) (112/60 - 144/73)  BP(mean): --  RR: 18 (29 Sep 2019 15:59) (17 - 18)  SpO2: 98% (29 Sep 2019 15:59) (91% - 98%)    PHYSICAL EXAM:  GENERAL: NAD  HEAD:  Atraumatic, Normocephalic  EYES: EOMI, PERRLA, conjunctiva and sclera clear  NECK: Supple, No JVD  CHEST/LUNG: Clear to auscultation bilaterally; No wheeze  HEART: Regular rate and rhythm; No murmurs, rubs, or gallops  ABDOMEN: Soft, Nontender, Nondistended; Bowel sounds present  EXTREMITIES:  2+L>R bipedal edema  PSYCH: Anxious  NEUROLOGY: non-focal  SKIN: No rashes or lesions    LABS:                        10.5   9.09  )-----------( 312      ( 29 Sep 2019 09:38 )             34.1     09-29    137  |  96  |  20  ----------------------------<  309<H>  4.0   |  26  |  3.37<H>    Ca    9.4      29 Sep 2019 07:13                  RADIOLOGY & ADDITIONAL TESTS:    Imaging Personally Reviewed:    Consultant(s) Notes Reviewed:      Care Discussed with Consultants/Other Providers:

## 2019-09-29 NOTE — PROGRESS NOTE ADULT - ASSESSMENT
65y/o M w/h/o uncontrolled TIDM (HbA1c 8.1% 9/19) c/b neuropathy and retinopathy as well as CAD s/p multiple stents, CHF (EF 50% 10/2018), TIA, PVD s/p b/l fem-pop bypass, ESRD> HD. Recent admission with COPD exacerbation felt 2/2 viral illness from enterovirus now presents with worsening dyspnea and hypoxemia. Now treated for PNA > on antibiotic > on steroids. Pt remains hyperglycemic but with BG improved. Last dose of Prednisone was yesterday, 9/27/19, FS remain elevated in 200-300s. Patient noted to be eating outside food.

## 2019-09-29 NOTE — PROGRESS NOTE ADULT - PROBLEM SELECTOR PLAN 1
-test BG AC/HS/2AM  -Increase Lantus dose to 12units QHS as patient still hyperglycemic   -Increase Humalog to units AC meals   -Continue Humalog 3 units w/bedtime snack  -Continue low Humalog correction scales premeal and qhs -test BG AC/HS/2AM  -Increase Lantus dose to 12units QHS as patient still hyperglycemic   -Increase Humalog to 8 units AC meals   -Continue Humalog 3 units w/bedtime snack  -Continue low Humalog correction scales premeal and qhs

## 2019-09-29 NOTE — PROGRESS NOTE ADULT - SUBJECTIVE AND OBJECTIVE BOX
Chief Complaint: hyperglycemia in patient on steroids and nonadherent to diet     History: Patient seen and examined at bedside. BG elevated this morning to 300s. Reports appetite is great. Patient reports she had rice kripsey treat, cream of wheat and scrambled eggs. Patient noted to be eating outside soup. Patient reports breathing is improved. Does not report any nausea, vomting, diarrhea or constipation.     MEDICATIONS  (STANDING):  ALBUTerol/ipratropium for Nebulization 3 milliLiter(s) Nebulizer every 6 hours  aspirin enteric coated 81 milliGRAM(s) Oral daily  atorvastatin 40 milliGRAM(s) Oral at bedtime  buDESOnide    Inhalation Suspension 0.5 milliGRAM(s) Inhalation two times a day  chlorhexidine 2% Cloths 1 Application(s) Topical <User Schedule>  clopidogrel Tablet 75 milliGRAM(s) Oral daily  dextrose 5%. 1000 milliLiter(s) (50 mL/Hr) IV Continuous <Continuous>  dextrose 50% Injectable 12.5 Gram(s) IV Push once  dextrose 50% Injectable 25 Gram(s) IV Push once  dextrose 50% Injectable 25 Gram(s) IV Push once  docusate sodium 100 milliGRAM(s) Oral three times a day  heparin  Injectable 5000 Unit(s) SubCutaneous two times a day  hydrALAZINE 25 milliGRAM(s) Oral three times a day  insulin glargine Injectable (LANTUS) 11 Unit(s) SubCutaneous at bedtime  insulin lispro (HumaLOG) corrective regimen sliding scale   SubCutaneous three times a day before meals  insulin lispro (HumaLOG) corrective regimen sliding scale   SubCutaneous at bedtime  insulin lispro (HumaLOG) corrective regimen sliding scale   SubCutaneous <User Schedule>  insulin lispro Injectable (HumaLOG) 6 Unit(s) SubCutaneous three times a day before meals  insulin lispro Injectable (HumaLOG) 3 Unit(s) SubCutaneous at bedtime  isosorbide   dinitrate Tablet (ISORDIL) 10 milliGRAM(s) Oral three times a day  metoprolol succinate ER 50 milliGRAM(s) Oral daily  mupirocin 2% Ointment 1 Application(s) Both Nostrils two times a day  Nephro-raphael 1 Tablet(s) Oral daily  pantoprazole    Tablet 40 milliGRAM(s) Oral before breakfast  sevelamer carbonate 800 milliGRAM(s) Oral three times a day with meals  sodium chloride 0.9%. 500 milliLiter(s) (40 mL/Hr) IV Continuous <Continuous>    MEDICATIONS  (PRN):  dextrose 40% Gel 15 Gram(s) Oral once PRN Blood Glucose LESS THAN 70 milliGRAM(s)/deciliter  glucagon  Injectable 1 milliGRAM(s) IntraMuscular once PRN Glucose LESS THAN 70 milligrams/deciliter  oxyCODONE    5 mG/acetaminophen 325 mG 1 Tablet(s) Oral every 6 hours PRN Severe Pain (7 - 10)  senna 2 Tablet(s) Oral at bedtime PRN Constipation    PHYSICAL EXAM:  VITALS: T(C): 36.4 (09-29-19 @ 11:33)  T(F): 97.6 (09-29-19 @ 11:33), Max: 98.2 (09-28-19 @ 21:34)  HR: 87 (09-29-19 @ 11:33) (83 - 88)  BP: 117/80 (09-29-19 @ 11:33) (116/61 - 144/73)  RR:  (17 - 18)  SpO2:  (91% - 96%)  Wt(kg): 72kg   GENERAL: NAD, well-groomed, well-developed  RESPIRATORY: Clear to auscultation bilaterally, on nasal cannula   CARDIOVASCULAR: Regular rate and rhythm; No murmurs; no peripheral edema  PSYCH: Alert and oriented x 3, reactive affect     POCT Blood Glucose.: 282 mg/dL (09-29-19 @ 11:46) H6  POCT Blood Glucose.: 366 mg/dL (09-29-19 @ 07:47) H6+5  POCT Blood Glucose.: 228 mg/dL (09-29-19 @ 02:06) L 11  POCT Blood Glucose.: 137 mg/dL (09-28-19 @ 21:31)   POCT Blood Glucose.: 141 mg/dL (09-28-19 @ 16:32) H 6+2  POCT Blood Glucose.: 242 mg/dL (09-28-19 @ 11:43) H 6+5  POCT Blood Glucose.: 380 mg/dL (09-28-19 @ 08:06)  POCT Blood Glucose.: 321 mg/dL (09-28-19 @ 02:13)  POCT Blood Glucose.: 136 mg/dL (09-27-19 @ 21:37)  POCT Blood Glucose.: 256 mg/dL (09-27-19 @ 17:12)  POCT Blood Glucose.: 248 mg/dL (09-27-19 @ 11:58)  POCT Blood Glucose.: 317 mg/dL (09-27-19 @ 08:04)  POCT Blood Glucose.: 383 mg/dL (09-27-19 @ 02:02)  POCT Blood Glucose.: 285 mg/dL (09-26-19 @ 21:26)  POCT Blood Glucose.: 258 mg/dL (09-26-19 @ 16:48)      09-29    137  |  96  |  20  ----------------------------<  309<H>  4.0   |  26  |  3.37<H>    EGFR if : 16<L>  EGFR if non : 14<L>    Ca    9.4      09-29  Mg     2.2     09-27            Thyroid Function Tests:      Hemoglobin A1C, Whole Blood: 8.1 % <H> [4.0 - 5.6] (09-16-19 @ 08:04) Chief Complaint: hyperglycemia in patient on steroids and nonadherent to diet     History: Patient seen and examined at bedside. BG elevated this morning to 300s. Reports appetite is great. Patient reports she had rice kripsey treat, cream of wheat and scrambled eggs. Patient noted to be eating outside soup. Patient reports breathing is improved. Does not report any nausea, vomting, diarrhea or constipation.     MEDICATIONS  (STANDING):  ALBUTerol/ipratropium for Nebulization 3 milliLiter(s) Nebulizer every 6 hours  aspirin enteric coated 81 milliGRAM(s) Oral daily  atorvastatin 40 milliGRAM(s) Oral at bedtime  buDESOnide    Inhalation Suspension 0.5 milliGRAM(s) Inhalation two times a day  chlorhexidine 2% Cloths 1 Application(s) Topical <User Schedule>  clopidogrel Tablet 75 milliGRAM(s) Oral daily  dextrose 5%. 1000 milliLiter(s) (50 mL/Hr) IV Continuous <Continuous>  dextrose 50% Injectable 12.5 Gram(s) IV Push once  dextrose 50% Injectable 25 Gram(s) IV Push once  dextrose 50% Injectable 25 Gram(s) IV Push once  docusate sodium 100 milliGRAM(s) Oral three times a day  heparin  Injectable 5000 Unit(s) SubCutaneous two times a day  hydrALAZINE 25 milliGRAM(s) Oral three times a day  insulin glargine Injectable (LANTUS) 11 Unit(s) SubCutaneous at bedtime  insulin lispro (HumaLOG) corrective regimen sliding scale   SubCutaneous three times a day before meals  insulin lispro (HumaLOG) corrective regimen sliding scale   SubCutaneous at bedtime  insulin lispro (HumaLOG) corrective regimen sliding scale   SubCutaneous <User Schedule>  insulin lispro Injectable (HumaLOG) 6 Unit(s) SubCutaneous three times a day before meals  insulin lispro Injectable (HumaLOG) 3 Unit(s) SubCutaneous at bedtime  isosorbide   dinitrate Tablet (ISORDIL) 10 milliGRAM(s) Oral three times a day  metoprolol succinate ER 50 milliGRAM(s) Oral daily  mupirocin 2% Ointment 1 Application(s) Both Nostrils two times a day  Nephro-raphael 1 Tablet(s) Oral daily  pantoprazole    Tablet 40 milliGRAM(s) Oral before breakfast  sevelamer carbonate 800 milliGRAM(s) Oral three times a day with meals  sodium chloride 0.9%. 500 milliLiter(s) (40 mL/Hr) IV Continuous <Continuous>    MEDICATIONS  (PRN):  dextrose 40% Gel 15 Gram(s) Oral once PRN Blood Glucose LESS THAN 70 milliGRAM(s)/deciliter  glucagon  Injectable 1 milliGRAM(s) IntraMuscular once PRN Glucose LESS THAN 70 milligrams/deciliter  oxyCODONE    5 mG/acetaminophen 325 mG 1 Tablet(s) Oral every 6 hours PRN Severe Pain (7 - 10)  senna 2 Tablet(s) Oral at bedtime PRN Constipation    PHYSICAL EXAM:  VITALS: T(C): 36.4 (09-29-19 @ 11:33)  T(F): 97.6 (09-29-19 @ 11:33), Max: 98.2 (09-28-19 @ 21:34)  HR: 87 (09-29-19 @ 11:33) (83 - 88)  BP: 117/80 (09-29-19 @ 11:33) (116/61 - 144/73)  RR:  (17 - 18)  SpO2:  (91% - 96%)  Wt(kg): 72kg   GENERAL: NAD, well-groomed, well-developed  RESPIRATORY: Clear to auscultation bilaterally, on nasal cannula   CARDIOVASCULAR: Regular rate and rhythm; No murmurs; no peripheral edema  PSYCH: Alert and oriented x 3, reactive affect     POCT Blood Glucose.: 282 mg/dL (09-29-19 @ 11:46) H6 +3  POCT Blood Glucose.: 366 mg/dL (09-29-19 @ 07:47) H6+5  POCT Blood Glucose.: 228 mg/dL (09-29-19 @ 02:06) L 11  POCT Blood Glucose.: 137 mg/dL (09-28-19 @ 21:31)   POCT Blood Glucose.: 141 mg/dL (09-28-19 @ 16:32) H 6+2  POCT Blood Glucose.: 242 mg/dL (09-28-19 @ 11:43) H 6+5  POCT Blood Glucose.: 380 mg/dL (09-28-19 @ 08:06)  POCT Blood Glucose.: 321 mg/dL (09-28-19 @ 02:13)  POCT Blood Glucose.: 136 mg/dL (09-27-19 @ 21:37)  POCT Blood Glucose.: 256 mg/dL (09-27-19 @ 17:12)  POCT Blood Glucose.: 248 mg/dL (09-27-19 @ 11:58)  POCT Blood Glucose.: 317 mg/dL (09-27-19 @ 08:04)  POCT Blood Glucose.: 383 mg/dL (09-27-19 @ 02:02)  POCT Blood Glucose.: 285 mg/dL (09-26-19 @ 21:26)  POCT Blood Glucose.: 258 mg/dL (09-26-19 @ 16:48)      09-29    137  |  96  |  20  ----------------------------<  309<H>  4.0   |  26  |  3.37<H>    EGFR if : 16<L>  EGFR if non : 14<L>    Ca    9.4      09-29  Mg     2.2     09-27    Hemoglobin A1C, Whole Blood: 8.1 % <H> [4.0 - 5.6] (09-16-19 @ 08:04)

## 2019-09-29 NOTE — PROGRESS NOTE ADULT - ASSESSMENT
· Assessment		  63 yo F with ESRD (on HD M/Tu/Th/Sa), type 1 DM w/DKA in past, CAD, HFrEF (EF 50% on TTE from 10/18), TIA, and PVD, very recent admission here until yesterday for COPD exacerbation felt 2/2 viral illness from enterovirus now presents with worsening dyspnea and hypoxemia down to 80s (O2 sat 82% on intial ED evaluation per ED provider note).       Pneumonia of left lower lobe due to infectious organism. Resolving  - Concern for HCAP given multiple recent hospitalizations in setting of known viral URI.  O2 supplemental PRN.  ID follow. Observe off antibiotics.  Pulmonary follow     Chronic obstructive pulmonary disease, unspecified COPD type.   Duonebs q6h ATC  budesonide nebulizers q12h    EKG abnormalities.   Findings of lateral TWI on EKG. May be related to hypoxemia exacerbating underlying CAD. Pt with chronically elevated troponin T.  Cardiology follow Dr. 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   Lantus 9 units qhs  Humalog 3 units TID AC  ISS.   Endocrine follow noted    Chronic congestive heart failure, unspecified heart failure type.  Appears euvolemic at this time  Volume management via HD while inpatient, did receive HD already today  renal consult for HD.     Coronary artery disease involving native coronary artery of native heart without angina pectoris.   atorvastatin  Aspirin  Plavix    ESRD  HD  Nephrology follow Dr. Schmidt    Left leg pain  - vascular evaluation noted  - no intervention  - PVR with open graft.    DCP home if stable and arrangements for HD in place.    d/w patient   Pierce Viera MD pager [phone] · Assessment		  63 yo F with ESRD (on HD M/Tu/Th/Sa), type 1 DM w/DKA in past, CAD, HFrEF (EF 50% on TTE from 10/18), TIA, and PVD, very recent admission here until yesterday for COPD exacerbation felt 2/2 viral illness from enterovirus now presents with worsening dyspnea and hypoxemia down to 80s (O2 sat 82% on intial ED evaluation per ED provider note).       Pneumonia of left lower lobe due to infectious organism, multifactorial etiology. Resolving  - Concern for HCAP given multiple recent hospitalizations in setting of known viral URI.  O2 supplemental PRN.  ID follow. Observe off antibiotics.  Pulmonary follow     Chronic obstructive pulmonary disease, unspecified COPD type.   Duonebs q6h ATC  budesonide nebulizers q12h    EKG abnormalities.   Findings of lateral TWI on EKG. May be related to hypoxemia exacerbating underlying CAD. Pt with chronically elevated troponin T.  Cardiology follow Dr. 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   Lantus 9 units qhs  Humalog 3 units TID AC  ISS.   Endocrine follow noted    Chronic congestive heart failure, unspecified heart failure type.  Appears euvolemic at this time  Volume management via HD while inpatient, did receive HD already today  renal consult for HD.     Coronary artery disease involving native coronary artery of native heart without angina pectoris.   atorvastatin  Aspirin  Plavix    ESRD  HD  Nephrology follow Dr. Schmidt    Left leg pain  - vascular evaluation noted  - no intervention  - PVR with open graft.    DCP home if stable and arrangements for HD in place.    d/w patient   Pierce Viera MD pager [phone]

## 2019-09-29 NOTE — PROGRESS NOTE ADULT - SUBJECTIVE AND OBJECTIVE BOX
[facility]   [phone]  RENAL FOLLOW UP NOTE  --------------------------------------------------------------------------------  Chief Complaint:    24 hour events/subjective:    seen. states cough is dissipating     PAST HISTORY  --------------------------------------------------------------------------------  No significant changes to PMH, PSH, FHx, SHx, unless otherwise noted    ALLERGIES & MEDICATIONS  --------------------------------------------------------------------------------  Allergies    No Known Allergies    Intolerances      Standing Inpatient Medications  ALBUTerol/ipratropium for Nebulization 3 milliLiter(s) Nebulizer every 6 hours  aspirin enteric coated 81 milliGRAM(s) Oral daily  atorvastatin 40 milliGRAM(s) Oral at bedtime  buDESOnide    Inhalation Suspension 0.5 milliGRAM(s) Inhalation two times a day  chlorhexidine 2% Cloths 1 Application(s) Topical <User Schedule>  clopidogrel Tablet 75 milliGRAM(s) Oral daily  dextrose 5%. 1000 milliLiter(s) IV Continuous <Continuous>  dextrose 50% Injectable 12.5 Gram(s) IV Push once  dextrose 50% Injectable 25 Gram(s) IV Push once  dextrose 50% Injectable 25 Gram(s) IV Push once  docusate sodium 100 milliGRAM(s) Oral three times a day  heparin  Injectable 5000 Unit(s) SubCutaneous two times a day  hydrALAZINE 25 milliGRAM(s) Oral three times a day  insulin glargine Injectable (LANTUS) 11 Unit(s) SubCutaneous at bedtime  insulin lispro (HumaLOG) corrective regimen sliding scale   SubCutaneous three times a day before meals  insulin lispro (HumaLOG) corrective regimen sliding scale   SubCutaneous at bedtime  insulin lispro (HumaLOG) corrective regimen sliding scale   SubCutaneous <User Schedule>  insulin lispro Injectable (HumaLOG) 6 Unit(s) SubCutaneous three times a day before meals  insulin lispro Injectable (HumaLOG) 3 Unit(s) SubCutaneous at bedtime  isosorbide   dinitrate Tablet (ISORDIL) 10 milliGRAM(s) Oral three times a day  metoprolol succinate ER 50 milliGRAM(s) Oral daily  mupirocin 2% Ointment 1 Application(s) Both Nostrils two times a day  Nephro-raphael 1 Tablet(s) Oral daily  pantoprazole    Tablet 40 milliGRAM(s) Oral before breakfast  sevelamer carbonate 800 milliGRAM(s) Oral three times a day with meals  sodium chloride 0.9%. 500 milliLiter(s) IV Continuous <Continuous>    PRN Inpatient Medications  dextrose 40% Gel 15 Gram(s) Oral once PRN  glucagon  Injectable 1 milliGRAM(s) IntraMuscular once PRN  oxyCODONE    5 mG/acetaminophen 325 mG 1 Tablet(s) Oral every 6 hours PRN  senna 2 Tablet(s) Oral at bedtime PRN      REVIEW OF SYSTEMS  --------------------------------------------------------------------------------    Gen: denies  fevers/chills,  CVS: denies chest pain/palpitations  Resp: denies SOB/Cough +   GI: Denies N/V/Abd pain  : Denies dysuria	    All other systems were reviewed and are negative, except as noted.    VITALS/PHYSICAL EXAM  --------------------------------------------------------------------------------  T(C): 36.4 (09-29-19 @ 11:33), Max: 36.8 (09-28-19 @ 21:34)  HR: 87 (09-29-19 @ 11:33) (83 - 88)  BP: 117/80 (09-29-19 @ 11:33) (116/61 - 144/73)  RR: 18 (09-29-19 @ 11:33) (17 - 18)  SpO2: 94% (09-29-19 @ 11:33) (91% - 96%)  Wt(kg): --        09-28-19 @ 07:01  -  09-29-19 @ 07:00  --------------------------------------------------------  IN: 1760 mL / OUT: 2800 mL / NET: -1040 mL      Physical Exam:  	    Physical Exam:  	Gen: alert oriented place person and date   	Pulm: Decreased breath sounds b/l bases. no rales or ronchi or wheezing  	CV: RRR, S1/S2. no rub  	Back: No CVA tenderness; no sacral edema  	Abd: +BS, soft, nontender/nondistended  	: No suprapubic tenderness.               Extremity: No cyanosis, no edema no clubbing  	Neuro: No focal deficits  	Psych: Normal affect and mood      LABS/STUDIES  --------------------------------------------------------------------------------              10.5   9.09  >-----------<  312      [09-29-19 @ 09:38]              34.1     137  |  96  |  20  ----------------------------<  309      [09-29-19 @ 07:13]  4.0   |  26  |  3.37        Ca     9.4     [09-29-19 @ 07:13]            Creatinine Trend:  SCr 3.37 [09-29 @ 07:13]  SCr 4.53 [09-28 @ 11:09]  SCr 4.79 [09-27 @ 06:33]  SCr 4.96 [09-27 @ 00:49]  SCr 4.69 [09-26 @ 20:03]                  Iron 42, TIBC 253, %sat 17      [06-17-19 @ 17:54]  Ferritin 1838      [06-17-19 @ 18:06]  PTH -- (Ca 9.6)      [06-17-19 @ 18:06]   177  PTH -- (Ca 7.8)      [03-29-19 @ 20:38]   73  PTH -- (Ca 7.3)      [02-22-19 @ 02:49]   59  HbA1c 8.1      [09-16-19 @ 08:04]  TSH 0.71      [11-17-18 @ 08:25]

## 2019-09-29 NOTE — PROGRESS NOTE ADULT - ASSESSMENT
63 y/o F w/ PMHx ESRD (HD M/T/T/Sat), IDDM, CHF, CAD, ischemic cardiomyopathy presents to the ED BIBA for fever. pt also in DKA, hyperglycemia and  with hyperkalemia as well as DKA and pneumonia     1- esrd  2- CHF  3- DKA hx   4- HTN   5- chf  6- shpt      hd am  chf compensated   cont steroids  cont insulin hyperglycemia  hydralazine 25 mg tid   renvela one tab tid with meals      Electronic Signatures:

## 2019-09-30 ENCOUNTER — TRANSCRIPTION ENCOUNTER (OUTPATIENT)
Age: 64
End: 2019-09-30

## 2019-09-30 VITALS
OXYGEN SATURATION: 93 % | HEART RATE: 106 BPM | TEMPERATURE: 98 F | RESPIRATION RATE: 19 BRPM | DIASTOLIC BLOOD PRESSURE: 76 MMHG | SYSTOLIC BLOOD PRESSURE: 146 MMHG

## 2019-09-30 LAB
GLUCOSE BLDC GLUCOMTR-MCNC: 132 MG/DL — HIGH (ref 70–99)
GLUCOSE BLDC GLUCOMTR-MCNC: 188 MG/DL — HIGH (ref 70–99)
GLUCOSE BLDC GLUCOMTR-MCNC: 222 MG/DL — HIGH (ref 70–99)
GLUCOSE BLDC GLUCOMTR-MCNC: 227 MG/DL — HIGH (ref 70–99)

## 2019-09-30 PROCEDURE — 87633 RESP VIRUS 12-25 TARGETS: CPT

## 2019-09-30 PROCEDURE — 85027 COMPLETE CBC AUTOMATED: CPT

## 2019-09-30 PROCEDURE — 96374 THER/PROPH/DIAG INJ IV PUSH: CPT | Mod: XU

## 2019-09-30 PROCEDURE — 93925 LOWER EXTREMITY STUDY: CPT

## 2019-09-30 PROCEDURE — 80202 ASSAY OF VANCOMYCIN: CPT

## 2019-09-30 PROCEDURE — 85014 HEMATOCRIT: CPT

## 2019-09-30 PROCEDURE — 99232 SBSQ HOSP IP/OBS MODERATE 35: CPT

## 2019-09-30 PROCEDURE — 84295 ASSAY OF SERUM SODIUM: CPT

## 2019-09-30 PROCEDURE — 83605 ASSAY OF LACTIC ACID: CPT

## 2019-09-30 PROCEDURE — 87641 MR-STAPH DNA AMP PROBE: CPT

## 2019-09-30 PROCEDURE — 84100 ASSAY OF PHOSPHORUS: CPT

## 2019-09-30 PROCEDURE — 84132 ASSAY OF SERUM POTASSIUM: CPT

## 2019-09-30 PROCEDURE — 80048 BASIC METABOLIC PNL TOTAL CA: CPT

## 2019-09-30 PROCEDURE — 87486 CHLMYD PNEUM DNA AMP PROBE: CPT

## 2019-09-30 PROCEDURE — 82947 ASSAY GLUCOSE BLOOD QUANT: CPT

## 2019-09-30 PROCEDURE — 93971 EXTREMITY STUDY: CPT

## 2019-09-30 PROCEDURE — 83735 ASSAY OF MAGNESIUM: CPT

## 2019-09-30 PROCEDURE — 71275 CT ANGIOGRAPHY CHEST: CPT

## 2019-09-30 PROCEDURE — 82803 BLOOD GASES ANY COMBINATION: CPT

## 2019-09-30 PROCEDURE — 87798 DETECT AGENT NOS DNA AMP: CPT

## 2019-09-30 PROCEDURE — 82962 GLUCOSE BLOOD TEST: CPT

## 2019-09-30 PROCEDURE — 82010 KETONE BODYS QUAN: CPT

## 2019-09-30 PROCEDURE — 99285 EMERGENCY DEPT VISIT HI MDM: CPT | Mod: 25

## 2019-09-30 PROCEDURE — 71045 X-RAY EXAM CHEST 1 VIEW: CPT

## 2019-09-30 PROCEDURE — 94640 AIRWAY INHALATION TREATMENT: CPT

## 2019-09-30 PROCEDURE — 87040 BLOOD CULTURE FOR BACTERIA: CPT

## 2019-09-30 PROCEDURE — 84484 ASSAY OF TROPONIN QUANT: CPT

## 2019-09-30 PROCEDURE — 99261: CPT

## 2019-09-30 PROCEDURE — 70450 CT HEAD/BRAIN W/O DYE: CPT

## 2019-09-30 PROCEDURE — 93005 ELECTROCARDIOGRAM TRACING: CPT

## 2019-09-30 PROCEDURE — 87640 STAPH A DNA AMP PROBE: CPT

## 2019-09-30 PROCEDURE — 82435 ASSAY OF BLOOD CHLORIDE: CPT

## 2019-09-30 PROCEDURE — 87581 M.PNEUMON DNA AMP PROBE: CPT

## 2019-09-30 PROCEDURE — 80053 COMPREHEN METABOLIC PANEL: CPT

## 2019-09-30 PROCEDURE — 82330 ASSAY OF CALCIUM: CPT

## 2019-09-30 RX ORDER — INSULIN GLARGINE 100 [IU]/ML
12 INJECTION, SOLUTION SUBCUTANEOUS
Qty: 1 | Refills: 0
Start: 2019-09-30 | End: 2019-10-29

## 2019-09-30 RX ORDER — INSULIN ASPART 100 [IU]/ML
4 INJECTION, SOLUTION SUBCUTANEOUS
Qty: 0 | Refills: 0 | DISCHARGE
Start: 2019-09-30

## 2019-09-30 RX ORDER — INSULIN ASPART 100 [IU]/ML
10 INJECTION, SOLUTION SUBCUTANEOUS
Qty: 1 | Refills: 0
Start: 2019-09-30

## 2019-09-30 RX ORDER — ATORVASTATIN CALCIUM 80 MG/1
1 TABLET, FILM COATED ORAL
Qty: 30 | Refills: 0
Start: 2019-09-30 | End: 2019-10-29

## 2019-09-30 RX ORDER — ATORVASTATIN CALCIUM 80 MG/1
1 TABLET, FILM COATED ORAL
Qty: 0 | Refills: 0 | DISCHARGE

## 2019-09-30 RX ORDER — INSULIN LISPRO 100/ML
10 VIAL (ML) SUBCUTANEOUS
Qty: 1 | Refills: 0
Start: 2019-09-30 | End: 2019-10-29

## 2019-09-30 RX ORDER — INSULIN GLARGINE 100 [IU]/ML
8 INJECTION, SOLUTION SUBCUTANEOUS
Qty: 0 | Refills: 0 | DISCHARGE

## 2019-09-30 RX ADMIN — Medication 8 UNIT(S): at 17:43

## 2019-09-30 RX ADMIN — ISOSORBIDE DINITRATE 10 MILLIGRAM(S): 5 TABLET ORAL at 06:11

## 2019-09-30 RX ADMIN — CLOPIDOGREL BISULFATE 75 MILLIGRAM(S): 75 TABLET, FILM COATED ORAL at 12:03

## 2019-09-30 RX ADMIN — ISOSORBIDE DINITRATE 10 MILLIGRAM(S): 5 TABLET ORAL at 13:14

## 2019-09-30 RX ADMIN — Medication 1 TABLET(S): at 12:02

## 2019-09-30 RX ADMIN — Medication 8 UNIT(S): at 08:18

## 2019-09-30 RX ADMIN — PANTOPRAZOLE SODIUM 40 MILLIGRAM(S): 20 TABLET, DELAYED RELEASE ORAL at 06:11

## 2019-09-30 RX ADMIN — OXYCODONE AND ACETAMINOPHEN 1 TABLET(S): 5; 325 TABLET ORAL at 02:00

## 2019-09-30 RX ADMIN — Medication 1: at 12:01

## 2019-09-30 RX ADMIN — OXYCODONE AND ACETAMINOPHEN 1 TABLET(S): 5; 325 TABLET ORAL at 01:09

## 2019-09-30 RX ADMIN — Medication 3 MILLILITER(S): at 12:02

## 2019-09-30 RX ADMIN — Medication 0.5 MILLIGRAM(S): at 06:11

## 2019-09-30 RX ADMIN — MUPIROCIN 1 APPLICATION(S): 20 OINTMENT TOPICAL at 06:12

## 2019-09-30 RX ADMIN — Medication 81 MILLIGRAM(S): at 12:03

## 2019-09-30 RX ADMIN — SEVELAMER CARBONATE 800 MILLIGRAM(S): 2400 POWDER, FOR SUSPENSION ORAL at 08:18

## 2019-09-30 RX ADMIN — Medication 8 UNIT(S): at 12:02

## 2019-09-30 RX ADMIN — CHLORHEXIDINE GLUCONATE 1 APPLICATION(S): 213 SOLUTION TOPICAL at 06:12

## 2019-09-30 RX ADMIN — SEVELAMER CARBONATE 800 MILLIGRAM(S): 2400 POWDER, FOR SUSPENSION ORAL at 12:02

## 2019-09-30 RX ADMIN — OXYCODONE AND ACETAMINOPHEN 1 TABLET(S): 5; 325 TABLET ORAL at 18:59

## 2019-09-30 RX ADMIN — OXYCODONE AND ACETAMINOPHEN 1 TABLET(S): 5; 325 TABLET ORAL at 19:30

## 2019-09-30 RX ADMIN — Medication 3 MILLILITER(S): at 06:11

## 2019-09-30 RX ADMIN — Medication 2: at 08:18

## 2019-09-30 NOTE — PROGRESS NOTE ADULT - ASSESSMENT
· Assessment		  63 yo F with ESRD (on HD M/Tu/Th/Sa), type 1 DM w/DKA in past, CAD, HFrEF (EF 50% on TTE from 10/18), TIA, and PVD, very recent admission here until yesterday for COPD exacerbation felt 2/2 viral illness from enterovirus now presents with worsening dyspnea and hypoxemia down to 80s (O2 sat 82% on intial ED evaluation per ED provider note).       Pneumonia of left lower lobe due to infectious organism, multifactorial etiology. Resolving  - Concern for HCAP given multiple recent hospitalizations in setting of known viral URI.  O2 supplemental PRN.  ID follow. Observe off antibiotics. Cleared for DC  Pulmonary follow     Chronic obstructive pulmonary disease, unspecified COPD type.   Duonebs q6h ATC  budesonide nebulizers q12h    EKG abnormalities.   Findings of lateral TWI on EKG. May be related to hypoxemia exacerbating underlying CAD. Pt with chronically elevated troponin T.  Cardiology follow Dr. 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   Lantus 9 units qhs  Humalog 3 units TID AC  ISS.   Endocrine follow noted    Chronic congestive heart failure, unspecified heart failure type.  Appears euvolemic at this time  Volume management via HD while inpatient, did receive HD already today  renal consult for HD.     Coronary artery disease involving native coronary artery of native heart without angina pectoris.   atorvastatin  Aspirin  Plavix    ESRD  HD  Nephrology follow Dr. Schmidt    Left leg pain  - vascular evaluation noted  - no intervention  - PVR with open graft.    DC home after HD today. Follow with PMD/ Nephrology/ Cardiology / Endocrine in 3-4 days.     d/w patient   Pierce Viera MD pager [phone]

## 2019-09-30 NOTE — CHART NOTE - NSCHARTNOTEFT_GEN_A_CORE
[id number]  NATHAN MEEKS    Notified by day shift that patient has been medically cleared for discharge to home post HD tonight. Patient seen and assessed at bedside, NAD, alert and awake,  at bedside to take patient home. Patient stated she tolerated HD without issue. She denied headache, dizziness, chest pain, shortness of breath, nausea, vomiting, or abdominal pain. Vital signs hemodynamically stable.   Follow up with Cardiology, pulm and HD tomorrow at normal location.        Jenny Gar PA-C  Dept of Medicine  [phone]

## 2019-09-30 NOTE — DISCHARGE NOTE PROVIDER - HOSPITAL COURSE
65 yo F with ESRD (on HD M/Tu/Th/Sa), type 1 DM w/DKA in past, CAD, HFrEF (EF 50% on TTE from 10/18), TIA, and PVD, very recent admission here until yesterday for COPD exacerbation felt 2/2 viral illness from enterovirus now presents with worsening dyspnea and hypoxemia down to 80s (O2 sat 82% on intial ED evaluation per ED provider note).           Pneumonia of left lower lobe due to infectious organism, multifactorial etiology. Resolving    - Concern for HCAP given multiple recent hospitalizations in setting of known viral URI.    O2 supplemental PRN.    ID follow. Observe off antibiotics.    Pulmonary follow         Chronic obstructive pulmonary disease, unspecified COPD type.     Duonebs q6h ATC    budesonide nebulizers q12h        EKG abnormalities.     Findings of lateral TWI on EKG. May be related to hypoxemia exacerbating underlying CAD. Pt with chronically elevated troponin T.    Cardiology follow Dr. Biswas        Type 1 diabetes mellitus with chronic kidney disease on chronic dialysis/ DKA.     Lantus 9 units qhs    Humalog 3 units TID AC    ISS.     Endocrine follow noted        Chronic congestive heart failure, unspecified heart failure type.    Appears euvolemic at this time    Volume management via HD while inpatient, did receive HD already today    renal consult for HD.         Coronary artery disease involving native coronary artery of native heart without angina pectoris.     atorvastatin    Aspirin    Plavix        ESRD    HD    Nephrology follow Dr. Schmidt        Left leg pain    - vascular evaluation noted    - no intervention    - PVR with open graft.        DCP home if stable and arrangements for HD in place. 63 yo F with ESRD (on HD M/Tu/Th/Sa), type 1 DM w/DKA in past, CAD, HFrEF (EF 50% on TTE from 10/18), TIA, and PVD, very recent admission here until yesterday for COPD exacerbation felt 2/2 viral illness from enterovirus now presents with worsening dyspnea and hypoxemia down to 80s (O2 sat 82% on intial ED evaluation per ED provider note).           Pneumonia of left lower lobe due to infectious organism, multifactorial etiology. Resolving    - Concern for HCAP given multiple recent hospitalizations in setting of known viral URI.    O2 supplemental PRN.    ID follow. Observe off antibiotics.    Pulmonary follow         Chronic obstructive pulmonary disease, unspecified COPD type.     Duonebs q6h ATC    budesonide nebulizers q12h        EKG abnormalities.     Findings of lateral TWI on EKG. May be related to hypoxemia exacerbating underlying CAD. Pt with chronically elevated troponin T.    Cardiology follow Dr. Biswas        Type 1 diabetes mellitus with chronic kidney disease on chronic dialysis/ DKA.     Lantus 9 units qhs    Humalog 3 units TID AC    ISS.     Endocrine followed and adjusted Insulins accordingly. Discharged on Basligar 12 units at bedtime, novolog 10units TID with meals         Chronic congestive heart failure, unspecified heart failure type.    Appears euvolemic at this time    Volume management via HD while inpatient,. HD to be resumed Outpatient.     renal followed         Coronary artery disease involving native coronary artery of native heart without angina pectoris.     atorvastatin    Aspirin    Plavix        ESRD    HD    Nephrology follow Dr. Schmidt        Left leg pain    - vascular evaluation noted    - no intervention    - PVR with open graft.        DCP home if stable and arrangements for HD in place.

## 2019-09-30 NOTE — CHART NOTE - NSCHARTNOTEFT_GEN_A_CORE
Pt medically cleared for discharge by Dr. Viera. Will need HD prior to discharge today. Pt will need Long acting insulin 12 units and premeal 10 units as per endo. Follow up with Cardiology, pulm and HD tomorrow at normal location. Discharge home if BP stable after HD.

## 2019-09-30 NOTE — PROGRESS NOTE ADULT - REASON FOR ADMISSION
Hypoxemia 2/2 healthcare associated pneumonia

## 2019-09-30 NOTE — DISCHARGE NOTE PROVIDER - CARE PROVIDER_API CALL
Lance Elizalde)  Surgery; Vascular Surgery  [address] [facility], [address]  [place] [address]  Phone: [phone]  Fax: [phone]  Follow Up Time:     Arsalan Castro)  Internal Medicine  [address] [place] [address]  Phone: [phone]  Fax: [phone]  Follow Up Time:     Daisy Burger ()  Nephrology  [address] [facility] [address] [place] [address]  Phone: [phone]  Fax: [phone]  Follow Up Time:     Julián Biswas)  Internal Medicine  [address] [place] [address]  Phone: [phone]  Fax: [phone]  Follow Up Time: Lance Elizalde (MD)  Surgery; Vascular Surgery  [address] [facility], [address]  [place] [address]  Phone: [phone]  Fax: [phone]  Follow Up Time:     Arsalan Castro)  Internal Medicine  [address] [place] [address]  Phone: [phone]  Fax: [phone]  Follow Up Time:     Daisy Burger ()  Nephrology  [address] [facility][[address]] [address]  [place] [address]  Phone: [phone]  Fax: [phone]  Follow Up Time:     Julián Biswas)  Internal Medicine  [address] [place] [address]  Phone: [phone]  Fax: [phone]  Follow Up Time:     Braden Thompson (DO)  Internal Medicine; Pulmonary Disease  [address] [facility], [address]  [place] [address]  Phone: [phone]  Fax: [phone]  Follow Up Time:

## 2019-09-30 NOTE — PROGRESS NOTE ADULT - SUBJECTIVE AND OBJECTIVE BOX
[facility]   [phone]  DIALYSIS NOTE  Chief Complaint: ESRD/Ongoing hemodialysis requirement.     24 hour events/subjective:    states coughing is dissipating   no sob         ALLERGIES & MEDICATIONS  --------------------------------------------------------------------------------  Allergies    No Known Allergies    Intolerances      Standing Inpatient Medications  ALBUTerol/ipratropium for Nebulization 3 milliLiter(s) Nebulizer every 6 hours  aspirin enteric coated 81 milliGRAM(s) Oral daily  atorvastatin 40 milliGRAM(s) Oral at bedtime  buDESOnide    Inhalation Suspension 0.5 milliGRAM(s) Inhalation two times a day  chlorhexidine 2% Cloths 1 Application(s) Topical <User Schedule>  clopidogrel Tablet 75 milliGRAM(s) Oral daily  dextrose 5%. 1000 milliLiter(s) IV Continuous <Continuous>  dextrose 50% Injectable 12.5 Gram(s) IV Push once  dextrose 50% Injectable 25 Gram(s) IV Push once  dextrose 50% Injectable 25 Gram(s) IV Push once  docusate sodium 100 milliGRAM(s) Oral three times a day  heparin  Injectable 5000 Unit(s) SubCutaneous two times a day  hydrALAZINE 25 milliGRAM(s) Oral three times a day  insulin glargine Injectable (LANTUS) 12 Unit(s) SubCutaneous at bedtime  insulin lispro (HumaLOG) corrective regimen sliding scale   SubCutaneous three times a day before meals  insulin lispro (HumaLOG) corrective regimen sliding scale   SubCutaneous at bedtime  insulin lispro (HumaLOG) corrective regimen sliding scale   SubCutaneous <User Schedule>  insulin lispro Injectable (HumaLOG) 8 Unit(s) SubCutaneous three times a day before meals  insulin lispro Injectable (HumaLOG) 3 Unit(s) SubCutaneous at bedtime  isosorbide   dinitrate Tablet (ISORDIL) 10 milliGRAM(s) Oral three times a day  metoprolol succinate ER 50 milliGRAM(s) Oral daily  mupirocin 2% Ointment 1 Application(s) Both Nostrils two times a day  Nephro-raphael 1 Tablet(s) Oral daily  pantoprazole    Tablet 40 milliGRAM(s) Oral before breakfast  sevelamer carbonate 800 milliGRAM(s) Oral three times a day with meals  sodium chloride 0.9%. 500 milliLiter(s) IV Continuous <Continuous>    PRN Inpatient Medications  dextrose 40% Gel 15 Gram(s) Oral once PRN  glucagon  Injectable 1 milliGRAM(s) IntraMuscular once PRN  senna 2 Tablet(s) Oral at bedtime PRN      REVIEW OF SYSTEMS  --------------------------------------------------------------------------------  no itching or rash  no fever or chill  no cp or palp   no sob or cough   no N/V/D/ no abd pain   ext no edema        VITALS/PHYSICAL EXAM  --------------------------------------------------------------------------------  T(C): 36.5 (09-30-19 @ 20:22), Max: 36.8 (09-30-19 @ 12:22)  HR: 106 (09-30-19 @ 20:22) (80 - 106)  BP: 146/76 (09-30-19 @ 20:22) (110/57 - 146/76)  RR: 19 (09-30-19 @ 20:22) (18 - 20)  SpO2: 93% (09-30-19 @ 20:22) (90% - 100%)  Wt(kg): --        09-29-19 @ 07:01  -  09-30-19 @ 07:00  --------------------------------------------------------  IN: 1200 mL / OUT: 0 mL / NET: 1200 mL    09-30-19 @ 07:01  -  09-30-19 @ 21:50  --------------------------------------------------------  IN: 840 mL / OUT: 1500 mL / NET: -660 mL      Physical Exam:  		    	Gen: alert oriented place person and date   	Pulm: Decreased breath sounds b/l bases no rales or ronchi  	CV: RRR, S1/S2  	Abd: +BS, soft, nontender/nondistended  	Extremity: No cyanosis, no edema   	  	    LABS/STUDIES  --------------------------------------------------------------------------------              10.5   9.09  >-----------<  312      [09-29-19 @ 09:38]              34.1     137  |  96  |  20  ----------------------------<  309      [09-29-19 @ 07:13]  4.0   |  26  |  3.37        Ca     9.4     [09-29-19 @ 07:13]

## 2019-09-30 NOTE — PROGRESS NOTE ADULT - SUBJECTIVE AND OBJECTIVE BOX
Cardiovascular Disease Progress Note    Overnight events: No acute events overnight.  remains in nsr. less sob. no cp/palps/dizziness  Otherwise review of systems negative    Objective Findings:  T(C): 36.7 (09-30-19 @ 04:59), Max: 36.8 (09-29-19 @ 20:07)  HR: 82 (09-30-19 @ 06:08) (82 - 99)  BP: 115/67 (09-30-19 @ 06:08) (98/59 - 127/49)  RR: 18 (09-30-19 @ 06:08) (18 - 18)  SpO2: 97% (09-30-19 @ 06:08) (94% - 98%)  Wt(kg): --  Daily     Daily       Physical Exam:  Gen: NAD  HEENT: EOMI  CV: RRR, normal S1 + S2, no m/r/g  Lungs: CTAB, course bs  Abd: soft, non-tender  Ext: No edema    Telemetry: nsr    Laboratory Data:                        10.5   9.09  )-----------( 312      ( 29 Sep 2019 09:38 )             34.1     09-29    137  |  96  |  20  ----------------------------<  309<H>  4.0   |  26  |  3.37<H>    Ca    9.4      29 Sep 2019 07:13                Inpatient Medications:  MEDICATIONS  (STANDING):  ALBUTerol/ipratropium for Nebulization 3 milliLiter(s) Nebulizer every 6 hours  aspirin enteric coated 81 milliGRAM(s) Oral daily  atorvastatin 40 milliGRAM(s) Oral at bedtime  buDESOnide    Inhalation Suspension 0.5 milliGRAM(s) Inhalation two times a day  chlorhexidine 2% Cloths 1 Application(s) Topical <User Schedule>  clopidogrel Tablet 75 milliGRAM(s) Oral daily  dextrose 5%. 1000 milliLiter(s) (50 mL/Hr) IV Continuous <Continuous>  dextrose 50% Injectable 12.5 Gram(s) IV Push once  dextrose 50% Injectable 25 Gram(s) IV Push once  dextrose 50% Injectable 25 Gram(s) IV Push once  docusate sodium 100 milliGRAM(s) Oral three times a day  heparin  Injectable 5000 Unit(s) SubCutaneous two times a day  hydrALAZINE 25 milliGRAM(s) Oral three times a day  insulin glargine Injectable (LANTUS) 12 Unit(s) SubCutaneous at bedtime  insulin lispro (HumaLOG) corrective regimen sliding scale   SubCutaneous three times a day before meals  insulin lispro (HumaLOG) corrective regimen sliding scale   SubCutaneous at bedtime  insulin lispro (HumaLOG) corrective regimen sliding scale   SubCutaneous <User Schedule>  insulin lispro Injectable (HumaLOG) 8 Unit(s) SubCutaneous three times a day before meals  insulin lispro Injectable (HumaLOG) 3 Unit(s) SubCutaneous at bedtime  isosorbide   dinitrate Tablet (ISORDIL) 10 milliGRAM(s) Oral three times a day  metoprolol succinate ER 50 milliGRAM(s) Oral daily  mupirocin 2% Ointment 1 Application(s) Both Nostrils two times a day  Nephro-raphael 1 Tablet(s) Oral daily  pantoprazole    Tablet 40 milliGRAM(s) Oral before breakfast  sevelamer carbonate 800 milliGRAM(s) Oral three times a day with meals  sodium chloride 0.9%. 500 milliLiter(s) (40 mL/Hr) IV Continuous <Continuous>      Assessment:  -SOB  -pAT  -HCAP - recent admission for viral URI/RVP +  -elevated but stable cardiac enzymes (hs trop) with recent admission with relatively preserved ck/ck mb fraction and no obvious ischemic changes on ekg  -chest pain with mild ekg changes and stable hs trop  -NSVT  -pAT  -volume overload  -ischemic cardiomyopathy, cad s/p multiple stents  -esrd on hd  -PAD s/p prior intervention         Recs:  -s/p abx for HCAP  -known extensive CAD and ICM. s/p [facility] [place] [facility] 2/20/2019 --> severe and diffuse instent restenosis along RCA --> unable to stent due to multiple layers of stenting and prior brachytherapy  -will consider complex intervention if true ischemic and refractory sx develop   -c/w beta blockers for nsvt/pat/cad (as above). currently on toprol 50mg, isordil 10mg tid, hydral 25mg tid. uptitrate GDMT as tolerates. wouldnt inc bb at this time 2/2 bronchospasm  -brief episodes of pAT, less likely pAF. c/w tele monitoring and toprol. hold AC for now  -hx of prolonged qtc. avoid qtc prolonging meds  -s/p TTE 2019: mod-severe MR, pseudo AS (low flow-low gradient), mod-severe LV dysfunction --> recent CTS consult with dr hebert appreciated. plan is for medical management given surgical risk and patient preference  -c/w asa, plavix, statin for ischemic cardiomyopathy/CAD/PAD  -resting leg pain --> vascular c/s appreciated. f/u art duplex  -dvt ppx        Over 25 minutes spent on total encounter; more than 50% of the visit was spent counseling and/or coordinating care by the attending physician.      Julián Biswas MD   Cardiovascular Disease  [phone]

## 2019-09-30 NOTE — PROGRESS NOTE ADULT - SUBJECTIVE AND OBJECTIVE BOX
PULMONARY PROGRESS NOTE    NATHAN MEEKS  MRN-[id number]    Patient is a 64y old  Female who presents with a chief complaint of Hypoxemia 2/2 healthcare associated pneumonia (30 Sep 2019 08:01)      HPI:  she has no resp complaints  shes on 4L sitting and feels she needs it, but per RN this morning she ambulated around unti w/o 02- no desaturations and no distress      ROS:   -    ACTIVE MEDICATION LIST:  MEDICATIONS  (STANDING):  ALBUTerol/ipratropium for Nebulization 3 milliLiter(s) Nebulizer every 6 hours  aspirin enteric coated 81 milliGRAM(s) Oral daily  atorvastatin 40 milliGRAM(s) Oral at bedtime  buDESOnide    Inhalation Suspension 0.5 milliGRAM(s) Inhalation two times a day  chlorhexidine 2% Cloths 1 Application(s) Topical <User Schedule>  clopidogrel Tablet 75 milliGRAM(s) Oral daily  dextrose 5%. 1000 milliLiter(s) (50 mL/Hr) IV Continuous <Continuous>  dextrose 50% Injectable 12.5 Gram(s) IV Push once  dextrose 50% Injectable 25 Gram(s) IV Push once  dextrose 50% Injectable 25 Gram(s) IV Push once  docusate sodium 100 milliGRAM(s) Oral three times a day  heparin  Injectable 5000 Unit(s) SubCutaneous two times a day  hydrALAZINE 25 milliGRAM(s) Oral three times a day  insulin glargine Injectable (LANTUS) 12 Unit(s) SubCutaneous at bedtime  insulin lispro (HumaLOG) corrective regimen sliding scale   SubCutaneous three times a day before meals  insulin lispro (HumaLOG) corrective regimen sliding scale   SubCutaneous at bedtime  insulin lispro (HumaLOG) corrective regimen sliding scale   SubCutaneous <User Schedule>  insulin lispro Injectable (HumaLOG) 8 Unit(s) SubCutaneous three times a day before meals  insulin lispro Injectable (HumaLOG) 3 Unit(s) SubCutaneous at bedtime  isosorbide   dinitrate Tablet (ISORDIL) 10 milliGRAM(s) Oral three times a day  metoprolol succinate ER 50 milliGRAM(s) Oral daily  mupirocin 2% Ointment 1 Application(s) Both Nostrils two times a day  Nephro-raphael 1 Tablet(s) Oral daily  pantoprazole    Tablet 40 milliGRAM(s) Oral before breakfast  sevelamer carbonate 800 milliGRAM(s) Oral three times a day with meals  sodium chloride 0.9%. 500 milliLiter(s) (40 mL/Hr) IV Continuous <Continuous>    MEDICATIONS  (PRN):  dextrose 40% Gel 15 Gram(s) Oral once PRN Blood Glucose LESS THAN 70 milliGRAM(s)/deciliter  glucagon  Injectable 1 milliGRAM(s) IntraMuscular once PRN Glucose LESS THAN 70 milligrams/deciliter  oxyCODONE    5 mG/acetaminophen 325 mG 1 Tablet(s) Oral every 6 hours PRN Severe Pain (7 - 10)  senna 2 Tablet(s) Oral at bedtime PRN Constipation      EXAM:  Vital Signs Last 24 Hrs  T(C): 36.7 (30 Sep 2019 04:59), Max: 36.8 (29 Sep 2019 20:07)  T(F): 98.1 (30 Sep 2019 04:59), Max: 98.3 (29 Sep 2019 20:07)  HR: 82 (30 Sep 2019 06:08) (82 - 99)  BP: 115/67 (30 Sep 2019 06:08) (98/59 - 127/49)  BP(mean): --  RR: 18 (30 Sep 2019 06:08) (18 - 18)  SpO2: 97% (30 Sep 2019 06:08) (94% - 98%)    GENERAL: The patient is awake and alert in no apparent distress.     LUNGS: Clear to auscultation without wheezing, rales or rhonchi; respirations unlabored    HEART: Regular rate and rhythm without murmur.                            10.5   9.09  )-----------( 312      ( 29 Sep 2019 09:38 )             34.1       09-29    137  |  96  |  20  ----------------------------<  309<H>  4.0   |  26  |  3.37<H>    Ca    9.4      29 Sep 2019 07:13          PROBLEM LIST:  64y Female with HEALTH ISSUES - PROBLEM Dx:  Dyspnea: Dyspnea  ESRD (end stage renal disease): ESRD (end stage renal disease)  Coronary artery disease involving native coronary artery of native heart without angina pectoris: Coronary artery disease involving native coronary artery of native heart without angina pectoris  Chronic congestive heart failure, unspecified heart failure type: Chronic congestive heart failure, unspecified heart failure type  Type 1 diabetes mellitus with chronic kidney disease on chronic dialysis: Type 1 diabetes mellitus with chronic kidney disease on chronic dialysis  EKG abnormalities: EKG abnormalities  Chronic obstructive pulmonary disease, unspecified COPD type: Chronic obstructive pulmonary disease, unspecified COPD type  Pneumonia of left lower lobe due to infectious organism: Pneumonia of left lower lobe due to infectious organism            RECS:  neb Q6hrs change to PRN  continue budesonide BID  she completed 5 days of prednisone   outpatient f/u with repeat CT chest for the GGO/ lymphadenopathy - with Dr Thompson  no indication for 02 upon discharge  stable pulm status        Please call with any questions.    Chelle Kapoor DO  [facility] Pulmonary/Sleep Medicine  [phone]

## 2019-09-30 NOTE — DISCHARGE NOTE PROVIDER - NSDCCPCAREPLAN_GEN_ALL_CORE_FT
PRINCIPAL DISCHARGE DIAGNOSIS  Diagnosis: PNA (pneumonia)  Assessment and Plan of Treatment: Pneumonia is a lung infection that can cause a fever, cough, and trouble breathing.  Continue all antibiotics as ordered until complete.  Nutrition is important, eat small frequent meals.  Get lots of rest and drink fluids.  Call your health care provider upon arrival home from hospital and make a follow up appointment for one week.  If your cough worsens, you develop fever greater than 101', you have shaking chills, a fast heartbeat, trouble breathing and/or feel your are breathing much faster than usual, call your healthcare provider.  Make sure you wash your hands frequently.        SECONDARY DISCHARGE DIAGNOSES  Diagnosis: Mitral regurgitation  Assessment and Plan of Treatment: Follow with cardiology for managment    Diagnosis: PVD (peripheral vascular disease)  Assessment and Plan of Treatment: Follow up with Dr. Elizalde as scheduled    Diagnosis: PAT (paroxysmal atrial tachycardia)  Assessment and Plan of Treatment: Follow up with with Dr. Christianson as scheduled. Continue current medications    Diagnosis: Type 1 diabetes mellitus with chronic kidney disease on chronic dialysis  Assessment and Plan of Treatment: HgA1C this admission.  Make sure you get your HgA1c checked every three months.  Low blood sugar (hypoglycemia) is a blood sugar below 70mg/dl. Check your blood sugar if you feel signs/symptoms of hypoglycemia. If your blood sugar is below 70 take 15 grams of carbohydrates (ex 4 oz of apple juice, 3-4 glucose tablets, or 4-6 oz of regular soda) wait 15 minutes and repeat blood sugar to make sure it comes up above 70.  If your blood sugar is above 70 and you are due for a meal, have a meal.  If you are not due for a meal have a snack.  This snack helps keeps your blood sugar at a safe range.      Diagnosis: Chronic obstructive pulmonary disease, unspecified COPD type  Assessment and Plan of Treatment: Call your Health Care provider upon arrival home to make a follow up appointment within one week.  Take all inhalers as prescribed by your Health Care Provider.  Take steroids as prescribed by your Health Care Provider.  If your cough increases infrequency and severity and/or you have shortness of breath or increased shortness of breath call your Health Care Provider.  If you develop fever, chills, night sweats, malaise, and/or change in mental status call your Health care Provider.  Nutrition is very important.  Eat small frequent meals.  Increase your activity as tolerated.  Do not stay in bed all day      Diagnosis: Chronic congestive heart failure, unspecified heart failure type  Assessment and Plan of Treatment: Weigh yourself daily.  If you gain 3lbs in 3 days, or 5lbs in a week call your Health Care Provider.  Do not eat or drink foods containing more than 2000mg of salt (sodium) in your diet every day.  Call your Health Care Provider if you have any swelling or increased swelling in your feet, ankles, and/or stomach.  Take all of your medication as directed.  If you become dizzy call your Health Care Provider.      Diagnosis: ESRD (end stage renal disease)  Assessment and Plan of Treatment: Avoid taking (NSAIDs) - (ex: Ibuprofen, Advil, Celebrex, Naprosyn)  Avoid taking any nephrotoxic agents (can harm kidneys) - Intravenous contrast for diagnostic testing, combination cold medications.  Have all medications adjusted for your renal function by your Health Care Provider.  Blood pressure control is important.  Take all medication as prescribed.  HD as scheduled PRINCIPAL DISCHARGE DIAGNOSIS  Diagnosis: PNA (pneumonia)  Assessment and Plan of Treatment: Pneumonia is a lung infection that can cause a fever, cough, and trouble breathing.  Continue all antibiotics as ordered until complete.  Nutrition is important, eat small frequent meals.  Get lots of rest and drink fluids.  Call your health care provider upon arrival home from hospital and make a follow up appointment for one week.  If your cough worsens, you develop fever greater than 101', you have shaking chills, a fast heartbeat, trouble breathing and/or feel your are breathing much faster than usual, call your healthcare provider.  Make sure you wash your hands frequently.        SECONDARY DISCHARGE DIAGNOSES  Diagnosis: Lymphadenopathy  Assessment and Plan of Treatment: outpatient f/u with repeat CT chest for the GGO/ lymphadenopathy - with Dr Thompson      Diagnosis: Mitral regurgitation  Assessment and Plan of Treatment: Follow with cardiology for managment    Diagnosis: PVD (peripheral vascular disease)  Assessment and Plan of Treatment: Chronic Lower extremity pain.   Patient should continue to follow outpatient with Dr. Elizalde for LE pain.      Diagnosis: PAT (paroxysmal atrial tachycardia)  Assessment and Plan of Treatment: Follow up with with Dr. Christianson as scheduled. Continue current medications    Diagnosis: Type 1 diabetes mellitus with chronic kidney disease on chronic dialysis  Assessment and Plan of Treatment: HgA1C this admission.  Make sure you get your HgA1c checked every three months.  Low blood sugar (hypoglycemia) is a blood sugar below 70mg/dl. Check your blood sugar if you feel signs/symptoms of hypoglycemia. If your blood sugar is below 70 take 15 grams of carbohydrates (ex 4 oz of apple juice, 3-4 glucose tablets, or 4-6 oz of regular soda) wait 15 minutes and repeat blood sugar to make sure it comes up above 70.  If your blood sugar is above 70 and you are due for a meal, have a meal.  If you are not due for a meal have a snack.  This snack helps keeps your blood sugar at a safe range.      Diagnosis: Chronic obstructive pulmonary disease, unspecified COPD type  Assessment and Plan of Treatment: Call your Health Care provider upon arrival home to make a follow up appointment within one week.  Take all inhalers as prescribed by your Health Care Provider.  Take steroids as prescribed by your Health Care Provider.  If your cough increases infrequency and severity and/or you have shortness of breath or increased shortness of breath call your Health Care Provider.  If you develop fever, chills, night sweats, malaise, and/or change in mental status call your Health care Provider.  Nutrition is very important.  Eat small frequent meals.  Increase your activity as tolerated.  Do not stay in bed all day      Diagnosis: Chronic congestive heart failure, unspecified heart failure type  Assessment and Plan of Treatment: Weigh yourself daily.  If you gain 3lbs in 3 days, or 5lbs in a week call your Health Care Provider.  Do not eat or drink foods containing more than 2000mg of salt (sodium) in your diet every day.  Call your Health Care Provider if you have any swelling or increased swelling in your feet, ankles, and/or stomach.  Take all of your medication as directed.  If you become dizzy call your Health Care Provider.      Diagnosis: ESRD (end stage renal disease)  Assessment and Plan of Treatment: Avoid taking (NSAIDs) - (ex: Ibuprofen, Advil, Celebrex, Naprosyn)  Avoid taking any nephrotoxic agents (can harm kidneys) - Intravenous contrast for diagnostic testing, combination cold medications.  Have all medications adjusted for your renal function by your Health Care Provider.  Blood pressure control is important.  Take all medication as prescribed.  HD as scheduled

## 2019-09-30 NOTE — PROGRESS NOTE ADULT - ASSESSMENT
63 y/o F w/ PMHx ESRD (HD M/T/T/Sat), IDDM, CHF, CAD, ischemic cardiomyopathy presents to the ED BIBA for fever. pt also in DKA, hyperglycemia and  with hyperkalemia as well as DKA and pneumonia     1- esrd  2- CHF  3- DKA hx   4- HTN   5- chf  6- shpt      hd   hd f 160 3 hours 1-1.5 liter 2 k bfr 400 dfr 600   hydralazine 25 mg tid   renvela one tab tid with meals

## 2019-09-30 NOTE — DISCHARGE NOTE PROVIDER - CARE PROVIDERS DIRECT ADDRESSES
[email or web address],DirectAddress_Unknown,DirectAddress_Unknown,DirectAddress_Unknown [email or web address],DirectAddress_Unknown,DirectAddress_Unknown,DirectAddress_Unknown,DirectAddress_Unknown

## 2019-09-30 NOTE — DISCHARGE NOTE NURSING/CASE MANAGEMENT/SOCIAL WORK - PATIENT PORTAL LINK FT
You can access the FollowMyHealth Patient Portal offered by [facility] by registering at the following website: [email or web address]. By joining [employer]’s FollowMyHealth portal, you will also be able to view your health information using other applications (apps) compatible with our system.

## 2019-09-30 NOTE — PROGRESS NOTE ADULT - PROBLEM SELECTOR PLAN 1
DISCHARGE:  -test BG AC/HS  -C/w Lantus 12  QHS and Humalog 8 ac meals. Pt's family adjust insulin doses according to PO intake and BG values. Pt's  also has DM and provides DM care at home.  -Plan discussed with pt/team. Pt follows with Dr Ley as out pt.  Contact info: [phone] (24/7). pager [phone] DISCHARGE:  -test BG AC/HS  -C/w Lantus 12  QHS and increase Humalog to 10 ac meals. Pt's family adjust insulin doses according to PO intake and BG values. Pt's  also has DM and provides DM care at home.  -Plan discussed with pt/team. Pt follows with Dr Ley as out pt.  Contact info: [phone] (24/7). pager [phone] DISCHARGE:  -test BG AC/HS  -C/w Lantus 12  QHS and Humalog 8 ac meals. Pt's family adjust insulin doses according to PO intake and BG values. Pt's  also has DM and provides DM care at home.  -Plan discussed with pt/team. Pt follows with Dr Ley as out pt.  Contact info: [phone] (24/7). pager [phone]

## 2019-09-30 NOTE — PROGRESS NOTE ADULT - PROBLEM SELECTOR PROBLEM 1
Type 1 diabetes mellitus with chronic kidney disease on chronic dialysis
Uncontrolled type 1 diabetes mellitus with ESRD (end-stage renal disease)
Uncontrolled type 1 diabetes mellitus with ESRD (end-stage renal disease)
Type 1 diabetes mellitus with chronic kidney disease on chronic dialysis

## 2019-09-30 NOTE — CHART NOTE - NSCHARTNOTEFT_GEN_A_CORE
Results of duplex noted:  Diminished but adequate flows bilaterally without evidence of stenosis or occlusion.  Would not perform any intervention this admission.  Patient should continue to follow outpatient with Dr. Elizalde for LE pain.    Please call vascular surgery with any concerns.  CITLALY Ashton MD  [id number]  [phone]

## 2019-09-30 NOTE — PROGRESS NOTE ADULT - SUBJECTIVE AND OBJECTIVE BOX
Diabetes Follow up note: Saw pt earlier today  Interval Hx: 65y/o M w/h/o uncontrolled TIDM (HbA1c 8.1% 9/19) c/b neuropathy and retinopathy as well as CAD s/p multiple stents, CHF (EF 50% 10/2018), TIA, PVD s/p b/l fem-pop bypass, ESRD> HD. Pt was recently discharged after having COPD exacerbation felt 2/2 viral illness from enterovirus now presents with worsening dyspnea and hypoxemia. Pt had steroid therapy with steroid induced hyperglycemia. Now off steroids but still Glucose levels 200s most of the time while on present insulin regimen. Pt states she is very hungry and is eating more because she feels better.  Remains with on/off nutritional indiscretions which makes glycemic control more difficult to achieve. Reports feeling much better and ready to go home. Per team pt for discharge home today     Review of Systems:  General: as above.   GI: Tolerating POs without any N/V/D/ABD PAIN.   CV: No CP/SOB  ENDO: No S&Sx of hypoglycemia. no s/s of DKA.       MEDS:  atorvastatin 40 milliGRAM(s) Oral at bedtime  insulin glargine Injectable (LANTUS) 12 Unit(s) SubCutaneous at bedtime  insulin lispro (HumaLOG) corrective regimen sliding scale   SubCutaneous at bedtime  insulin lispro (HumaLOG) corrective regimen sliding scale   SubCutaneous three times a day before meals  insulin lispro Injectable (HumaLOG) 8 Unit(s) SubCutaneous three times a day before meals  insulin lispro Injectable (HumaLOG) 3 Unit(s) SubCutaneous at bedtime    Allergies    No Known Allergies    PE:  General: Female sitting in chair in NAD.   Vital Signs Last 24 Hrs  T(C): 36.8 (09-30-19 @ 12:22), Max: 36.8 (09-29-19 @ 20:07)  T(F): 98.2 (09-30-19 @ 12:22), Max: 98.3 (09-29-19 @ 20:07)  HR: 80 (09-30-19 @ 12:22) (80 - 99)  BP: 114/63 (09-30-19 @ 12:22) (98/59 - 127/49)  BP(mean): --  RR: 20 (09-30-19 @ 13:12) (18 - 20)  SpO2: 90% (09-30-19 @ 13:12) (90% - 98%)  Abd: Soft, NT, ND,   Extremities: Warm. B/L LE edema stable.   Neuro: A&O X3    LABS:  POCT Blood Glucose.: 188 mg/dL (09-30-19 @ 11:50)  POCT Blood Glucose.: 227 mg/dL (09-30-19 @ 08:03)  POCT Blood Glucose.: 222 mg/dL (09-30-19 @ 02:49)  POCT Blood Glucose.: 219 mg/dL (09-29-19 @ 21:12)  POCT Blood Glucose.: 288 mg/dL (09-29-19 @ 16:24)  POCT Blood Glucose.: 282 mg/dL (09-29-19 @ 11:46)  POCT Blood Glucose.: 366 mg/dL (09-29-19 @ 07:47)  POCT Blood Glucose.: 228 mg/dL (09-29-19 @ 02:06)  POCT Blood Glucose.: 137 mg/dL (09-28-19 @ 21:31)  POCT Blood Glucose.: 141 mg/dL (09-28-19 @ 16:32)                          10.5   9.09  )-----------( 312      ( 29 Sep 2019 09:38 )             34.1     09-29    137  |  96  |  20  ----------------------------<  309<H>  4.0   |  26  |  3.37<H>    Ca    9.4      29 Sep 2019 07:13      Hemoglobin A1C, Whole Blood: 8.1 % <H> [4.0 - 5.6] (09-16-19 @ 08:04)

## 2019-09-30 NOTE — PROGRESS NOTE ADULT - ASSESSMENT
63y/o M w/h/o uncontrolled TIDM (HbA1c 8.1% 9/19) c/b neuropathy and retinopathy as well as CAD s/p multiple stents, CHF (EF 50% 10/2018), TIA, PVD s/p b/l fem-pop bypass, ESRD> HD. Recent admission with COPD exacerbation felt 2/2 viral illness from enterovirus now presents with worsening dyspnea and hypoxemia. Treated for PNA > on antibiotic > on steroids with glucotoxicity. Pt remains hyperglycemic but with BG improved while OFF steroids. No hypoglycemia. Going home today. Spoke to pt and team about fianl insulin doses. Per pt, her family adjust insulin doses at home according to BG levels and PO intake. Pt reminded of the need to inject insulin while eating carbs. Pt verbalizes understanding and states her  always does that.

## 2019-09-30 NOTE — PROGRESS NOTE ADULT - SUBJECTIVE AND OBJECTIVE BOX
CC: f/u for  pneumonia  Patient reports  she feels good, going home , very bored  REVIEW OF SYSTEMS:  All other review of systems negative (Comprehensive ROS)    Antimicrobials Day #  :    Other Medications Reviewed    T(F): 98.2 (09-30-19 @ 12:22), Max: 98.3 (09-29-19 @ 20:07)  HR: 80 (09-30-19 @ 12:22)  BP: 114/63 (09-30-19 @ 12:22)  RR: 20 (09-30-19 @ 13:12)  SpO2: 90% (09-30-19 @ 13:12)  Wt(kg): --    PHYSICAL EXAM:  General: alert, no acute distress  Eyes:  anicteric, no conjunctival injection, no discharge  Oropharynx: no lesions or injection 	  Neck: supple, without adenopathy  Lungs: clear to auscultation  Heart: regular rate and rhythm; no murmur, rubs or gallops  Abdomen: soft, nondistended, nontender, without mass or organomegaly  Skin: no lesions  Extremities: no clubbing, cyanosis, or edema  Neurologic: alert, oriented, moves all extremities    LAB RESULTS:                        10.5   9.09  )-----------( 312      ( 29 Sep 2019 09:38 )             34.1     09-29    137  |  96  |  20  ----------------------------<  309<H>  4.0   |  26  |  3.37<H>    Ca    9.4      29 Sep 2019 07:13          MICROBIOLOGY:  RECENT CULTURES:      RADIOLOGY REVIEWED:  < from: CT Angio Chest w/ IV Cont (09.21.19 @ 18:27) >    EXAM:  CT ANGIO CHEST (W)AW IC                            PROCEDURE DATE:  09/21/2019            INTERPRETATION:  CLINICAL INFORMATION: Chest pain. Evaluate for pulmonary   embolism    COMPARISON: CT chest 4/18/2019, 10/2/2017.    PROCEDURE:   CT Angiography of the Chest.  68 ml of Omnipaque 350 was injected intravenously. 32 ml were discarded.  Sagittal and coronal reformats were performed as well as 3D (MIP)   reconstructions.    FINDINGS:    LUNGS AND AIRWAYS: Patent central airways. Branching "tree-in-bud"   airspace opacities in bilateral lower lobes, right middle lobe and   lingula. 1.3 cm right upper lobe focal groundglass airspace opacity   (series 3, image 109) is not significantly changed. 0.8 cm focal   groundglass versus opacityin the right upper lobe (series 3, image 166)   is not significantly changed. 1.2 cm focal groundglass airspace opacity   in the left upper lobe (series 3, image 92) is not significantly changed.   Bilateral lower lobe subsegmental atelectasis. Biapical scarring. Right   apical blebs. Centrilobular emphysema most prominent in the upper lobes.    PLEURA: No pleural effusion.    MEDIASTINUM AND SANDOVAL: No lymphadenopathy.    VESSELS: No pulmonary embolism. Atherosclerotic changes. Left subclavian   vein stent.    HEART: Heart size is enlarged. No pericardial effusion. Coronary artery,   aortic valvular and mitral annular calcifications.    CHEST WALL AND LOWER NECK: Within normal limits.    VISUALIZED UPPER ABDOMEN: Partially imaged atrophic leftkidney. Splenic   calcified granulomas.    BONES: Unchanged diffuse osseous sclerosis, again suggestive of renal   osteodystrophy.    IMPRESSION:     1. No pulmonary embolism.  2. Branching "tree-in-bud" airspace opacities in bilateral lower lobes,   right middle lobe and lingula likely infectious in etiology.  3. Focal groundglass airspace opacities in bilateral upper lobes, not   significant changed dating back to at least a CT of the chest from   10/2/2017, which could represent groundglass nodules.        LORRI ISRAEL M.D., RADIOLOGY RESIDENT    < end of copied text >  < from: Xray Chest 1 View- PORTABLE-Routine (09.24.19 @ 14:42) >  EXAM:  XR CHEST PORTABLE ROUTINE 1V                            PROCEDURE DATE:  09/24/2019            INTERPRETATION:  CLINICAL INFORMATION: Shortness of breath, COPD.    EXAM: Frontal radiograph of the chest.    COMPARISON: Chest radiograph from 9/14/2019    FINDINGS:  The heart size is normal.    The lungs are clear. No pneumothorax or pleural effusions.    Vascular stent in the left upper thorax.    The visualized osseous structures are unremarkable.    IMPRESSION: Clear lungs.    < end of copied text >        Assessment:  Patient had recent stay for viral uri, returns with pneumonia now doing well after course of antibiotics.   Plan:  monitor off further antibiotics  no ID objection to discharge

## 2019-09-30 NOTE — DISCHARGE NOTE PROVIDER - PROVIDER TOKENS
PROVIDER:[TOKEN:[[id number]]],PROVIDER:[TOKEN:[[id number]]],PROVIDER:[TOKEN:[[id number]]],PROVIDER:[TOKEN:[[id number]]] PROVIDER:[TOKEN:[[id number]]],PROVIDER:[TOKEN:[[id number]]],PROVIDER:[TOKEN:[[id number]]],PROVIDER:[TOKEN:[[id number]]],PROVIDER:[TOKEN:[[id number]]]

## 2019-09-30 NOTE — PROGRESS NOTE ADULT - PROVIDER SPECIALTY LIST ADULT
Cardiology
Endocrinology
Infectious Disease
Internal Medicine
Nephrology
Pulmonology
Cardiology
Pulmonology
Endocrinology

## 2019-09-30 NOTE — PROGRESS NOTE ADULT - SUBJECTIVE AND OBJECTIVE BOX
Patient is a 64y old  Female who presents with a chief complaint of Hypoxemia 2/2 healthcare associated pneumonia (30 Sep 2019 15:42)      SUBJECTIVE / OVERNIGHT EVENTS: feels better. Ambulating in hallway.  Review of Systems  chest pain no  palpitations no  sob no  nausea no  headache no    MEDICATIONS  (STANDING):  ALBUTerol/ipratropium for Nebulization 3 milliLiter(s) Nebulizer every 6 hours  aspirin enteric coated 81 milliGRAM(s) Oral daily  atorvastatin 40 milliGRAM(s) Oral at bedtime  buDESOnide    Inhalation Suspension 0.5 milliGRAM(s) Inhalation two times a day  chlorhexidine 2% Cloths 1 Application(s) Topical <User Schedule>  clopidogrel Tablet 75 milliGRAM(s) Oral daily  dextrose 5%. 1000 milliLiter(s) (50 mL/Hr) IV Continuous <Continuous>  dextrose 50% Injectable 12.5 Gram(s) IV Push once  dextrose 50% Injectable 25 Gram(s) IV Push once  dextrose 50% Injectable 25 Gram(s) IV Push once  docusate sodium 100 milliGRAM(s) Oral three times a day  heparin  Injectable 5000 Unit(s) SubCutaneous two times a day  hydrALAZINE 25 milliGRAM(s) Oral three times a day  insulin glargine Injectable (LANTUS) 12 Unit(s) SubCutaneous at bedtime  insulin lispro (HumaLOG) corrective regimen sliding scale   SubCutaneous three times a day before meals  insulin lispro (HumaLOG) corrective regimen sliding scale   SubCutaneous at bedtime  insulin lispro (HumaLOG) corrective regimen sliding scale   SubCutaneous <User Schedule>  insulin lispro Injectable (HumaLOG) 8 Unit(s) SubCutaneous three times a day before meals  insulin lispro Injectable (HumaLOG) 3 Unit(s) SubCutaneous at bedtime  isosorbide   dinitrate Tablet (ISORDIL) 10 milliGRAM(s) Oral three times a day  metoprolol succinate ER 50 milliGRAM(s) Oral daily  mupirocin 2% Ointment 1 Application(s) Both Nostrils two times a day  Nephro-raphael 1 Tablet(s) Oral daily  pantoprazole    Tablet 40 milliGRAM(s) Oral before breakfast  sevelamer carbonate 800 milliGRAM(s) Oral three times a day with meals  sodium chloride 0.9%. 500 milliLiter(s) (40 mL/Hr) IV Continuous <Continuous>    MEDICATIONS  (PRN):  dextrose 40% Gel 15 Gram(s) Oral once PRN Blood Glucose LESS THAN 70 milliGRAM(s)/deciliter  glucagon  Injectable 1 milliGRAM(s) IntraMuscular once PRN Glucose LESS THAN 70 milligrams/deciliter  senna 2 Tablet(s) Oral at bedtime PRN Constipation      Vital Signs Last 24 Hrs  T(C): 36.7 (30 Sep 2019 19:30), Max: 36.8 (30 Sep 2019 12:22)  T(F): 98.1 (30 Sep 2019 19:30), Max: 98.2 (30 Sep 2019 12:22)  HR: 99 (30 Sep 2019 19:30) (80 - 99)  BP: 110/57 (30 Sep 2019 19:30) (110/57 - 127/49)  BP(mean): --  RR: 20 (30 Sep 2019 19:30) (18 - 20)  SpO2: 100% (30 Sep 2019 19:30) (90% - 100%)    PHYSICAL EXAM:  GENERAL: NAD  HEAD:  Atraumatic, Normocephalic  EYES: EOMI, PERRLA, conjunctiva and sclera clear  NECK: Supple, No JVD  CHEST/LUNG: Clear to auscultation bilaterally; No wheeze  HEART: Regular rate and rhythm; No murmurs, rubs, or gallops  ABDOMEN: Soft, Nontender, Nondistended; Bowel sounds present  EXTREMITIES:  2+ Peripheral Pulses, No clubbing, cyanosis, or edema   PSYCH: Anxious  NEUROLOGY: non-focal  SKIN: No rashes or lesions    LABS:                        10.5   9.09  )-----------( 312      ( 29 Sep 2019 09:38 )             34.1     09-29    137  |  96  |  20  ----------------------------<  309<H>  4.0   |  26  |  3.37<H>    Ca    9.4      29 Sep 2019 07:13                  RADIOLOGY & ADDITIONAL TESTS:    Imaging Personally Reviewed:    Consultant(s) Notes Reviewed:      Care Discussed with Consultants/Other Providers:

## 2019-10-01 PROCEDURE — 85610 PROTHROMBIN TIME: CPT

## 2019-10-01 PROCEDURE — 85730 THROMBOPLASTIN TIME PARTIAL: CPT

## 2019-10-01 PROCEDURE — 94640 AIRWAY INHALATION TREATMENT: CPT

## 2019-10-01 PROCEDURE — 82962 GLUCOSE BLOOD TEST: CPT

## 2019-10-01 PROCEDURE — 83036 HEMOGLOBIN GLYCOSYLATED A1C: CPT

## 2019-10-01 PROCEDURE — 99261: CPT

## 2019-10-01 PROCEDURE — 87798 DETECT AGENT NOS DNA AMP: CPT

## 2019-10-01 PROCEDURE — 80053 COMPREHEN METABOLIC PANEL: CPT

## 2019-10-01 PROCEDURE — 83880 ASSAY OF NATRIURETIC PEPTIDE: CPT

## 2019-10-01 PROCEDURE — 83735 ASSAY OF MAGNESIUM: CPT

## 2019-10-01 PROCEDURE — 87581 M.PNEUMON DNA AMP PROBE: CPT

## 2019-10-01 PROCEDURE — 84132 ASSAY OF SERUM POTASSIUM: CPT

## 2019-10-01 PROCEDURE — 86704 HEP B CORE ANTIBODY TOTAL: CPT

## 2019-10-01 PROCEDURE — 71045 X-RAY EXAM CHEST 1 VIEW: CPT

## 2019-10-01 PROCEDURE — 83605 ASSAY OF LACTIC ACID: CPT

## 2019-10-01 PROCEDURE — 85014 HEMATOCRIT: CPT

## 2019-10-01 PROCEDURE — 84295 ASSAY OF SERUM SODIUM: CPT

## 2019-10-01 PROCEDURE — 82010 KETONE BODYS QUAN: CPT

## 2019-10-01 PROCEDURE — 87486 CHLMYD PNEUM DNA AMP PROBE: CPT

## 2019-10-01 PROCEDURE — 86706 HEP B SURFACE ANTIBODY: CPT

## 2019-10-01 PROCEDURE — 82803 BLOOD GASES ANY COMBINATION: CPT

## 2019-10-01 PROCEDURE — 82553 CREATINE MB FRACTION: CPT

## 2019-10-01 PROCEDURE — 82435 ASSAY OF BLOOD CHLORIDE: CPT

## 2019-10-01 PROCEDURE — 87040 BLOOD CULTURE FOR BACTERIA: CPT

## 2019-10-01 PROCEDURE — 84484 ASSAY OF TROPONIN QUANT: CPT

## 2019-10-01 PROCEDURE — 82947 ASSAY GLUCOSE BLOOD QUANT: CPT

## 2019-10-01 PROCEDURE — 86803 HEPATITIS C AB TEST: CPT

## 2019-10-01 PROCEDURE — 96365 THER/PROPH/DIAG IV INF INIT: CPT

## 2019-10-01 PROCEDURE — 85027 COMPLETE CBC AUTOMATED: CPT

## 2019-10-01 PROCEDURE — 84100 ASSAY OF PHOSPHORUS: CPT

## 2019-10-01 PROCEDURE — 87340 HEPATITIS B SURFACE AG IA: CPT

## 2019-10-01 PROCEDURE — 82550 ASSAY OF CK (CPK): CPT

## 2019-10-01 PROCEDURE — 96375 TX/PRO/DX INJ NEW DRUG ADDON: CPT

## 2019-10-01 PROCEDURE — 87633 RESP VIRUS 12-25 TARGETS: CPT

## 2019-10-01 PROCEDURE — 82330 ASSAY OF CALCIUM: CPT

## 2019-10-01 PROCEDURE — 99285 EMERGENCY DEPT VISIT HI MDM: CPT | Mod: 25

## 2019-10-01 PROCEDURE — 93005 ELECTROCARDIOGRAM TRACING: CPT

## 2019-10-01 PROCEDURE — 80048 BASIC METABOLIC PNL TOTAL CA: CPT

## 2019-10-01 PROCEDURE — 94664 DEMO&/EVAL PT USE INHALER: CPT

## 2019-10-15 NOTE — PROVIDER CONTACT NOTE (OTHER) - REASON
Refused heparin subcut. Patient examined. Chart and X-rays reviewed. Agree with above note.    Warren Long MD

## 2019-10-21 ENCOUNTER — EMERGENCY (EMERGENCY)
Facility: HOSPITAL | Age: 64
LOS: 1 days | Discharge: HOME | End: 2019-10-21
Attending: EMERGENCY MEDICINE
Payer: MEDICARE

## 2019-10-21 VITALS
HEIGHT: 64 IN | RESPIRATION RATE: 18 BRPM | SYSTOLIC BLOOD PRESSURE: 136 MMHG | OXYGEN SATURATION: 96 % | WEIGHT: 119.93 LBS | DIASTOLIC BLOOD PRESSURE: 80 MMHG | HEART RATE: 73 BPM | TEMPERATURE: 98 F

## 2019-10-21 VITALS
WEIGHT: 111.99 LBS | HEART RATE: 77 BPM | SYSTOLIC BLOOD PRESSURE: 120 MMHG | HEIGHT: 64 IN | TEMPERATURE: 98 F | OXYGEN SATURATION: 100 % | RESPIRATION RATE: 18 BRPM | DIASTOLIC BLOOD PRESSURE: 72 MMHG

## 2019-10-21 VITALS
HEART RATE: 99 BPM | TEMPERATURE: 98 F | SYSTOLIC BLOOD PRESSURE: 143 MMHG | OXYGEN SATURATION: 97 % | RESPIRATION RATE: 20 BRPM | DIASTOLIC BLOOD PRESSURE: 88 MMHG

## 2019-10-21 DIAGNOSIS — Z98.89 OTHER SPECIFIED POSTPROCEDURAL STATES: Chronic | ICD-10-CM

## 2019-10-21 PROCEDURE — 26418 REPAIR FINGER TENDON: CPT | Mod: FA

## 2019-10-21 PROCEDURE — 99283 EMERGENCY DEPT VISIT LOW MDM: CPT

## 2019-10-21 PROCEDURE — 99283 EMERGENCY DEPT VISIT LOW MDM: CPT | Mod: GC

## 2019-10-21 PROCEDURE — 82962 GLUCOSE BLOOD TEST: CPT

## 2019-10-21 PROCEDURE — 12001 RPR S/N/AX/GEN/TRNK 2.5CM/<: CPT | Mod: FA,XU

## 2019-10-21 PROCEDURE — 99284 EMERGENCY DEPT VISIT MOD MDM: CPT | Mod: 25

## 2019-10-21 RX ORDER — OXYCODONE AND ACETAMINOPHEN 5; 325 MG/1; MG/1
1 TABLET ORAL ONCE
Refills: 0 | Status: DISCONTINUED | OUTPATIENT
Start: 2019-10-21 | End: 2019-10-21

## 2019-10-21 RX ORDER — TETANUS TOXOID, REDUCED DIPHTHERIA TOXOID AND ACELLULAR PERTUSSIS VACCINE, ADSORBED 5; 2.5; 8; 8; 2.5 [IU]/.5ML; [IU]/.5ML; UG/.5ML; UG/.5ML; UG/.5ML
0.5 SUSPENSION INTRAMUSCULAR ONCE
Refills: 0 | Status: DISCONTINUED | OUTPATIENT
Start: 2019-10-21 | End: 2019-10-21

## 2019-10-21 RX ADMIN — OXYCODONE AND ACETAMINOPHEN 1 TABLET(S): 5; 325 TABLET ORAL at 22:56

## 2019-10-21 NOTE — ED ADULT TRIAGE NOTE - CHIEF COMPLAINT QUOTE
Patient c/o left hand laceration. Patient cut her hand when cutting chicken about 2 hours. Patient in plavix.

## 2019-10-21 NOTE — ED PROVIDER NOTE - PATIENT PORTAL LINK FT
You can access the FollowMyHealth Patient Portal offered by [facility] by registering at the following website: [email or web address]. By joining [employer]’s FollowMyHealth portal, you will also be able to view your health information using other applications (apps) compatible with our system.

## 2019-10-21 NOTE — ED PROVIDER NOTE - PHYSICAL EXAMINATION
General: Chronically ill appearing, WN/WD NAD  HEENT: PERRLA, EOMI, moist mucous membranes  Neurology: A&Ox3, nonfocal, CARR x 4  Respiratory: crackles bilaterally, normal respiratory effort, no wheezes, crackles, rales  CV: +JVD, Tachycardic, S1S2, no murmurs, rubs or gallops  Abdominal: Soft, NT, ND +BS, Last BM  Extremities: +Pitting edema bilaterally, LLE larger than right (chronic for years per patient)  Tubes: AVF with palp thrill General: Chronically ill appearing, WN/WD NAD  HEENT: PERRLA, EOMI, moist mucous membranes  Neurology: A&Ox3, nonfocal, CARR x 4  Respiratory: crackles bilaterally, normal respiratory effort, no wheezes, crackles, rales  CV: +JVD, Tachycardic, S1S2, no murmurs, rubs or gallops  Abdominal: Soft, NT, ND +BS, Last BM  Extremities: +Pitting edema bilaterally, LLE larger than right (chronic for years per patient)  Tubes: AVF with palp thrill      Dr Jay gilbert --

## 2019-10-21 NOTE — ED PROVIDER NOTE - CARE PROVIDER_API CALL
Niki Richter (MD)  Surgery  [address], [address]  [place] [address]  Phone: [phone]  Fax: [phone]  Follow Up Time: 7-10 Days

## 2019-10-21 NOTE — ED PROVIDER NOTE - NSFOLLOWUPINSTRUCTIONS_ED_ALL_ED_FT
Take tylenol at home for pains.  Please follow up with hand surgery in one week for re-evaluation.  Wear the splint that the surgeon put you in until she tells you to stop wearing it.

## 2019-10-21 NOTE — ED PROVIDER NOTE - ATTENDING CONTRIBUTION TO CARE
Patient presenting complaining of laceartions to L hand.  Occurred earlier this evening while cutting chicken.  On plavix,  tried to get control of bleeding at home but was unable to.  Denying loss of sensation in hand.  Last tetanus spring 2016.  Of note patient seen in Emergency Department earlier today for generalized pains/dyspnea and refused workup for this at that time, continues to deny symptoms regarding this at this visit.    Exam:  General: Patient well appearing, vital signs within normal limits  HEENT: airway patent with moist mucous membranes  Neuro: no gross neurologic deficits, sensation in hand intact  Skin: warm, well perfused, small non repairable abrasion and repairable laceration proximal to L thumb on dorsal aspect  MSK: no bony deformity, tendon exam limited secondary to bleeding    Will washout and repair, possible hand consultation if evidence of tendon involvement on washout. Patient presenting complaining of laceartions to L hand.  Occurred earlier this evening while cutting chicken.  On plavix,  tried to get control of bleeding at home but was unable to.  Denying loss of sensation in hand.  Last tetanus spring 2016.  Of note patient seen in Emergency Department earlier today for generalized pains/dyspnea and refused workup for this at that time, continues to deny symptoms regarding this at this visit.    Exam:  General: Patient well appearing, vital signs within normal limits  HEENT: airway patent with moist mucous membranes  Neuro: no gross neurologic deficits, sensation in hand intact  Skin: warm, well perfused, small non repairable abrasion and repairable laceration proximal to L thumb on dorsal aspect  MSK: initial tendon exam limited secondary to bleeding, thumb in passive flexion at DIP joint, patient unable to extend    Will washout and re-evaluate, likely hand consultation if suspected tendon involvement found on washout.

## 2019-10-21 NOTE — ED PROVIDER NOTE - CLINICAL SUMMARY MEDICAL DECISION MAKING FREE TEXT BOX
63 yo F with ESRD (on HD M/Tu/Th/Sa), type 1 DM w/DKA in past, CAD, HFrEF (EF 50% on TTE from 10/18), TIA, and PVD, very recent admission here until sept 20 for COPD exacerbation/hypoxia felt 2/2 viral illness from enterovirus now presents from doctors office with generalized body pains and trouble breathing. Pt is now asymptomatic. Satting 97% on room air. AMbulating. Requesting to leave without workup. Will dc home.

## 2019-10-21 NOTE — ED PROVIDER NOTE - PATIENT PORTAL LINK FT
You can access the FollowMyHealth Patient Portal offered by [facility] by registering at the following website: [email or web address]. By joining [employer]’s FollowMyHealth portal, you will also be able to view your health information using other applications (apps) compatible with our system.

## 2019-10-21 NOTE — ED PROVIDER NOTE - OBJECTIVE STATEMENT
Patient is a 63 yo F with ESRD (on HD M/Tu/Th/Sa), type 1 DM w/DKA in past, CAD, HFrEF (EF 50% on TTE from 10/18), TIA, and PVD presents one hour after lacerating her left  thumb with a chicken knife while butterflying chicken breasts. right hand dominant. Patient applied pressure which did not stop the bleeding. Patient is unable to flex thumb. Sensory intact. Patient has had tetanus shot within past 10 years. Denies lightheadedness, dizziness. Denies loc. Patient is a 63 yo F with ESRD (on HD M/Tu/Th/Sa), type 1 DM w/DKA in past, CAD, HFrEF (EF 50% on TTE from 10/18), TIA, and PVD on Plavix and Asprin presents one hour after lacerating her left  thumb with a chicken knife while butterflying chicken breasts. right hand dominant. Patient applied pressure which did not stop the bleeding. Patient is unable to flex thumb. Sensory intact. Patient has had tetanus shot within past 10 years. Denies lightheadedness, dizziness. Denies loc.

## 2019-10-21 NOTE — ED ADULT NURSE NOTE - OBJECTIVE STATEMENT
pt 65 yo female via EMS picked up from her car by MD office pt hx ESRD COPD caRDIAC EXTENSIVE HX PT ALERT VITALS STABLE ON ARRIVAL PT ON DIALYSIS SAT TUES THURS LAST TX ON sAT 2 DAYS AGO ON ARRIVAL PT DENIES ANY COMPLAINT STATES SHE WANTS TO GO HOME WHEN QUESTIONED ABOUT EMS PT STATES SHE FELT LIKE SHE WAS BREATHING FUNNY EARLIER WHICH HAS RESOLVED ON ARRIVAL PT PENDING FURTHER ORDERS

## 2019-10-21 NOTE — ED PROVIDER NOTE - NS ED ROS FT
Gen: No fever, normal appetite  Eyes: No eye irritation or discharge  ENT: No ear pain, congestion, sore throat  Resp: No cough   Cardiovascular: No chest pain or palpitation  Gastroenteric: No nausea/vomiting, diarrhea, constipation  :  No change in urine output; no dysuria  MS: No joint or muscle pain  Skin: No rashes  Neuro: No headache; no abnormal movements  Remainder negative, except as per the HPI

## 2019-10-21 NOTE — ED ADULT NURSE NOTE - NSIMPLEMENTINTERV_GEN_ALL_ED
Implemented All Fall Risk Interventions:  [place] to call system. Call bell, personal items and telephone within reach. Instruct patient to call for assistance. Room bathroom lighting operational. Non-slip footwear when patient is off stretcher. Physically safe environment: no spills, clutter or unnecessary equipment. Stretcher in lowest position, wheels locked, appropriate side rails in place. Provide visual cue, wrist band, yellow gown, etc. Monitor gait and stability. Monitor for mental status changes and reorient to person, place, and time. Review medications for side effects contributing to fall risk. Reinforce activity limits and safety measures with patient and family.

## 2019-10-21 NOTE — ED PROVIDER NOTE - PROGRESS NOTE DETAILS
Pt alert and coherent SaO2 97 on RA Blood sugar wnl Pt adamant about her plan-- No blood tests or xrays denies any other eval ad --Thompson

## 2019-10-21 NOTE — ED PROVIDER NOTE - OBJECTIVE STATEMENT
63 yo F with ESRD (on HD M/Tu/Th/Sa), type 1 DM w/DKA in past, CAD, HFrEF (EF 50% on TTE from 10/18), TIA, and PVD, very recent admission here until sept 20 for COPD exacerbation/hypoxia felt 2/2 viral illness from enterovirus now presents from doctors office with generalized body pains and trouble breathing. Pt presents BIBEMS on supplemental O2. Pt is not on supplemental O2 at home. Pt denies any complaints at this time. 63 yo F with ESRD (on HD M/Tu/Th/Sa), type 1 DM w/DKA in past, CAD, HFrEF (EF 50% on TTE from 10/18), TIA, and PVD, very recent admission here until sept 20 for COPD exacerbation/hypoxia felt 2/2 viral illness from enterovirus now presents from doctors office with generalized body pains and trouble breathing. Pt presents BIBEMS on supplemental O2. Pt is not on supplemental O2 at home. Pt denies any complaints at this time. Requesting to go home without labs or imaging.

## 2019-10-21 NOTE — ED PROVIDER NOTE - NSFOLLOWUPINSTRUCTIONS_ED_ALL_ED_FT
1) You were seen in the ED for shortness of breath, likely from your known COPD and CHF.   2) You will benefit from your regularly scheduled dialysis tomorrow  3) Please follow up with your PMD in the next 24-48hrs.  4) Please return to the ED if you have any new or concerning symptoms.

## 2019-10-21 NOTE — ED PROVIDER NOTE - PROGRESS NOTE DETAILS
After bleeding control/local pain control with lidocaine/epi and washout tendon dysruption noted - hand surgery consulted for repair in Emergency Department.

## 2019-10-21 NOTE — ED PROVIDER NOTE - PHYSICAL EXAMINATION
Gen: NAD, AOx3, non-toxic appearing, able to ambulate without assistance  Head and Neck: NCAT, Neck supple without meningismus   HEENT: EOMI, PEERLA, normal conjunctiva, tongue midline, oral mucosa moist  Lung: CTAB, no respiratory distress, no wheezes/rhonchi/rales B/L, speaking in full sentences  CV: RRR, no murmurs, rubs or gallops  Abd: soft, NT, ND, no guarding, no rigidity, no rebound tenderness, no CVA tenderness, no masses.   MSK: no gross deformities, ROM normal in UE/LE, no back tenderness. Hand laceration to right 1st digit dorsal aspect   Neuro: CNs II-XII grossly intact. No focal sensory or motor deficits  Skin: Warm, well perfused, no rash, no leg swelling  Psych: Appropriate mood and affect Gen: NAD, AOx3, non-toxic appearing, able to ambulate without assistance  Head and Neck: NCAT, Neck supple without meningismus   HEENT: EOMI, PEERLA, normal conjunctiva, tongue midline, oral mucosa moist  Lung: CTAB, no respiratory distress, no wheezes/rhonchi/rales B/L, speaking in full sentences  CV: RRR, no murmurs, rubs or gallops  Abd: soft, NT, ND, no guarding, no rigidity, no rebound tenderness, no CVA tenderness, no masses.   MSK: no gross deformities, ROM normal in UE/LE, no back tenderness. Hand laceration to right 1st digit over proximal phalanx.   Neuro: CNs II-XII grossly intact. No focal sensory or motor deficits  Skin: Warm, well perfused, no rash, no leg swelling  Psych: Appropriate mood and affect

## 2019-10-21 NOTE — ED PROVIDER NOTE - CARE PLAN
Principal Discharge DX:	Shortness of breath  Secondary Diagnosis:	Chronic systolic congestive heart failure

## 2019-10-21 NOTE — ED PROVIDER NOTE - CLINICAL SUMMARY MEDICAL DECISION MAKING FREE TEXT BOX
Leonor PGY-1: Adult female presents with left thumb laceration secondary to accidently slipping while cutting chicken breasts. Patient on ASA and plavix. Patient unable to extend thumb. Leonor PGY-1: Adult female presents with left thumb laceration secondary to accidently slipping while cutting chicken breasts. Patient on ASA and plavix. Patient thumb held in flexion.

## 2019-10-21 NOTE — ED PROVIDER NOTE - ATTENDING CONTRIBUTION TO CARE
I have seen and evaluated this patient with the resident.   I agree with the findings  unless other wise stated.  I have made appropriate changes in documentations where needed, After my face to face bedside evaluation, I am further  noting: see detailed Physical exam. Pt alert and coherent SaO2 97 on RA Blood sugar wnl Pt adamant about her plan-- No blood tests or xrays denies any other eval ad --Thompson

## 2019-10-21 NOTE — ED ADULT NURSE NOTE - OBJECTIVE STATEMENT
64 yr old F arrived to the ED from home c/o lacerations. pt was cutting kitchen when the knife slipped slicing her L hands. pt has laceration proximal to thumb that is actively bleeding and small skin tear to L first finger. pt states shes unable to move her L thumb due to pain but is moving all other fingers. pressure applied to hand. Radial pulses strong and equal maribell.  MD at bedside 64 yr old F arrived to the ED from home c/o lacerations. pt was cutting kitchen when the knife slipped slicing her L hands. pt has laceration proximal to thumb that is actively bleeding and small skin tear to L first finger. pt states shes unable to move her L thumb due to pain but is moving all other fingers. pressure applied to hand. Radial pulses strong and equal maribell.  MD at bedside. pt UTD on tdap

## 2019-10-22 VITALS
HEART RATE: 72 BPM | SYSTOLIC BLOOD PRESSURE: 117 MMHG | RESPIRATION RATE: 17 BRPM | DIASTOLIC BLOOD PRESSURE: 74 MMHG | OXYGEN SATURATION: 99 %

## 2019-10-22 NOTE — PROCEDURE NOTE - NSLAC1DETAILSPROC_SKIN_A_CORE
non-extensive debridement/enlargement of wound/surgical exploration from penetrating trauma/multiple flaps aligned

## 2019-10-22 NOTE — CONSULT NOTE ADULT - SUBJECTIVE AND OBJECTIVE BOX
65 yo RHD F was slicing frozen chicken when suffered laceration to her dorsal thumb.  Unable to extend thumb IP.  Plastics requested to repair.    PAST MEDICAL/SURGICAL/FAMILY/SOCIAL HISTORY:    Past Medical History:  ACS (acute coronary syndrome)  1/2015 - cath revealed 100% ostial stenosis not amenable to PCI - medical management  CAD (coronary artery disease)  s/p stents  CHF (congestive heart failure)  EF 40-45%  COPD (chronic obstructive pulmonary disease)    CVA (cerebral infarction)  with no residual, 8 yrs ago, prior to heart surgery - ST memory loss  Diabetes mellitus type 1  Insulin Dependent -  DKA, type 1  1/2015  ESRD (end stage renal disease)  dialysis  M, tue, thursday, saturday  Gout  past  Hyperlipidemia    Localized enlarged lymph nodes    Peripheral vascular disease  occluded left fem-pop bypass 5/2015  Subclavian vein stenosis, left  s/p stent  TIA (transient ischemic attack)  x 2 - 8-9 years ago prior to ASD/VSD repair.     Past Surgical History:  AV (arteriovenous fistula)  Left AV graft; revision with stent placement 2-3 years ago  History of cardiac catheterization  1/2015 - no intervention  S/P cholecystectomy    S/P femoral-popliteal bypass surgery  L and R in 2013 with graft; 5/2015 CFA angioplasty left and ileofemoral endarterectomywith vein patch angioplasty of left fem-pop bypass graft  Multiple vascular surgery both leg, left fempop bypass revision 11/2015  Status post device closure of ASD  "clamshell".    NKDA    Meds: reviewed, including plavix and asa  Soc: former smoker, with  and daughter  Fam: noncontrib    ROS: as per HPI and ow neg    PE:  NAD  Alert  MMM  PERRL  Left dorsal thumb with 2cm laceration  NVI  Unable to extend at IPJ  Actively oozing  Dorsal abrasion to left dorsal index    A/p: 65 yo RHD F with L thumb laceration and laceration of EPL tendon.    Lac repaired and tendon repaired as per procedure note  Keep splint clean and dry 48 hrs  Then start daily dressing changes  Follow up 1 week for wound check    Niki Richter MD  Plastic SUrgery

## 2019-11-13 ENCOUNTER — INPATIENT (INPATIENT)
Facility: HOSPITAL | Age: 64
LOS: 1 days | Discharge: HOME | DRG: 291 | End: 2019-11-15
Attending: INTERNAL MEDICINE | Admitting: INTERNAL MEDICINE
Payer: MEDICARE

## 2019-11-13 VITALS
DIASTOLIC BLOOD PRESSURE: 77 MMHG | SYSTOLIC BLOOD PRESSURE: 137 MMHG | HEIGHT: 64 IN | RESPIRATION RATE: 16 BRPM | WEIGHT: 124.12 LBS | OXYGEN SATURATION: 99 % | HEART RATE: 73 BPM | TEMPERATURE: 98 F

## 2019-11-13 DIAGNOSIS — Z98.89 OTHER SPECIFIED POSTPROCEDURAL STATES: Chronic | ICD-10-CM

## 2019-11-13 DIAGNOSIS — I50.42 CHRONIC COMBINED SYSTOLIC (CONGESTIVE) AND DIASTOLIC (CONGESTIVE) HEART FAILURE: ICD-10-CM

## 2019-11-13 DIAGNOSIS — N18.6 END STAGE RENAL DISEASE: ICD-10-CM

## 2019-11-13 DIAGNOSIS — R73.9 HYPERGLYCEMIA, UNSPECIFIED: ICD-10-CM

## 2019-11-13 DIAGNOSIS — R06.02 SHORTNESS OF BREATH: ICD-10-CM

## 2019-11-13 DIAGNOSIS — E10.65 TYPE 1 DIABETES MELLITUS WITH HYPERGLYCEMIA: ICD-10-CM

## 2019-11-13 DIAGNOSIS — E87.5 HYPERKALEMIA: ICD-10-CM

## 2019-11-13 DIAGNOSIS — I25.10 ATHEROSCLEROTIC HEART DISEASE OF NATIVE CORONARY ARTERY WITHOUT ANGINA PECTORIS: ICD-10-CM

## 2019-11-13 LAB
ALBUMIN SERPL ELPH-MCNC: 5 G/DL — SIGNIFICANT CHANGE UP (ref 3.3–5)
ALP SERPL-CCNC: 111 U/L — SIGNIFICANT CHANGE UP (ref 40–120)
ALT FLD-CCNC: 15 U/L — SIGNIFICANT CHANGE UP (ref 10–45)
ANION GAP SERPL CALC-SCNC: 17 MMOL/L — SIGNIFICANT CHANGE UP (ref 5–17)
ANION GAP SERPL CALC-SCNC: 17 MMOL/L — SIGNIFICANT CHANGE UP (ref 5–17)
APTT BLD: 33.9 SEC — SIGNIFICANT CHANGE UP (ref 27.5–36.3)
AST SERPL-CCNC: 21 U/L — SIGNIFICANT CHANGE UP (ref 10–40)
B-OH-BUTYR SERPL-SCNC: 0 MMOL/L — SIGNIFICANT CHANGE UP
BASOPHILS # BLD AUTO: 0.03 K/UL — SIGNIFICANT CHANGE UP (ref 0–0.2)
BASOPHILS NFR BLD AUTO: 0.6 % — SIGNIFICANT CHANGE UP (ref 0–2)
BILIRUB SERPL-MCNC: 0.2 MG/DL — SIGNIFICANT CHANGE UP (ref 0.2–1.2)
BUN SERPL-MCNC: 40 MG/DL — HIGH (ref 7–23)
BUN SERPL-MCNC: 44 MG/DL — HIGH (ref 7–23)
CALCIUM SERPL-MCNC: 10.3 MG/DL — SIGNIFICANT CHANGE UP (ref 8.4–10.5)
CALCIUM SERPL-MCNC: 9.2 MG/DL — SIGNIFICANT CHANGE UP (ref 8.4–10.5)
CHLORIDE SERPL-SCNC: 89 MMOL/L — LOW (ref 96–108)
CHLORIDE SERPL-SCNC: 93 MMOL/L — LOW (ref 96–108)
CK MB BLD-MCNC: 1.7 % — SIGNIFICANT CHANGE UP (ref 0–3.5)
CK MB CFR SERPL CALC: 11.3 NG/ML — HIGH (ref 0–3.8)
CK SERPL-CCNC: 647 U/L — HIGH (ref 25–170)
CO2 SERPL-SCNC: 27 MMOL/L — SIGNIFICANT CHANGE UP (ref 22–31)
CO2 SERPL-SCNC: 28 MMOL/L — SIGNIFICANT CHANGE UP (ref 22–31)
CREAT SERPL-MCNC: 6.2 MG/DL — HIGH (ref 0.5–1.3)
CREAT SERPL-MCNC: 6.42 MG/DL — HIGH (ref 0.5–1.3)
EOSINOPHIL # BLD AUTO: 0.14 K/UL — SIGNIFICANT CHANGE UP (ref 0–0.5)
EOSINOPHIL NFR BLD AUTO: 2.8 % — SIGNIFICANT CHANGE UP (ref 0–6)
GAS PNL BLDV: SIGNIFICANT CHANGE UP
GLUCOSE BLDC GLUCOMTR-MCNC: 119 MG/DL — HIGH (ref 70–99)
GLUCOSE BLDC GLUCOMTR-MCNC: 133 MG/DL — HIGH (ref 70–99)
GLUCOSE SERPL-MCNC: 116 MG/DL — HIGH (ref 70–99)
GLUCOSE SERPL-MCNC: 335 MG/DL — HIGH (ref 70–99)
HCT VFR BLD CALC: 31.4 % — LOW (ref 34.5–45)
HGB BLD-MCNC: 10.2 G/DL — LOW (ref 11.5–15.5)
IMM GRANULOCYTES NFR BLD AUTO: 0.4 % — SIGNIFICANT CHANGE UP (ref 0–1.5)
INR BLD: 1.06 RATIO — SIGNIFICANT CHANGE UP (ref 0.88–1.16)
LYMPHOCYTES # BLD AUTO: 0.95 K/UL — LOW (ref 1–3.3)
LYMPHOCYTES # BLD AUTO: 18.9 % — SIGNIFICANT CHANGE UP (ref 13–44)
MAGNESIUM SERPL-MCNC: 2.6 MG/DL — SIGNIFICANT CHANGE UP (ref 1.6–2.6)
MCHC RBC-ENTMCNC: 32.5 GM/DL — SIGNIFICANT CHANGE UP (ref 32–36)
MCHC RBC-ENTMCNC: 33 PG — SIGNIFICANT CHANGE UP (ref 27–34)
MCV RBC AUTO: 101.6 FL — HIGH (ref 80–100)
MONOCYTES # BLD AUTO: 0.53 K/UL — SIGNIFICANT CHANGE UP (ref 0–0.9)
MONOCYTES NFR BLD AUTO: 10.5 % — SIGNIFICANT CHANGE UP (ref 2–14)
NEUTROPHILS # BLD AUTO: 3.36 K/UL — SIGNIFICANT CHANGE UP (ref 1.8–7.4)
NEUTROPHILS NFR BLD AUTO: 66.8 % — SIGNIFICANT CHANGE UP (ref 43–77)
NRBC # BLD: 0 /100 WBCS — SIGNIFICANT CHANGE UP (ref 0–0)
NT-PROBNP SERPL-SCNC: HIGH PG/ML (ref 0–300)
PHOSPHATE SERPL-MCNC: 6.1 MG/DL — HIGH (ref 2.5–4.5)
PLATELET # BLD AUTO: 217 K/UL — SIGNIFICANT CHANGE UP (ref 150–400)
POTASSIUM SERPL-MCNC: 5.8 MMOL/L — HIGH (ref 3.5–5.3)
POTASSIUM SERPL-MCNC: 6 MMOL/L — HIGH (ref 3.5–5.3)
POTASSIUM SERPL-SCNC: 5.8 MMOL/L — HIGH (ref 3.5–5.3)
POTASSIUM SERPL-SCNC: 6 MMOL/L — HIGH (ref 3.5–5.3)
PROT SERPL-MCNC: 8.4 G/DL — HIGH (ref 6–8.3)
PROTHROM AB SERPL-ACNC: 12.1 SEC — SIGNIFICANT CHANGE UP (ref 10–12.9)
RAPID RVP RESULT: SIGNIFICANT CHANGE UP
RBC # BLD: 3.09 M/UL — LOW (ref 3.8–5.2)
RBC # FLD: 13.5 % — SIGNIFICANT CHANGE UP (ref 10.3–14.5)
SODIUM SERPL-SCNC: 133 MMOL/L — LOW (ref 135–145)
SODIUM SERPL-SCNC: 138 MMOL/L — SIGNIFICANT CHANGE UP (ref 135–145)
TROPONIN T, HIGH SENSITIVITY RESULT: 313 NG/L — HIGH (ref 0–51)
TROPONIN T, HIGH SENSITIVITY RESULT: 342 NG/L — HIGH (ref 0–51)
WBC # BLD: 5.03 K/UL — SIGNIFICANT CHANGE UP (ref 3.8–10.5)
WBC # FLD AUTO: 5.03 K/UL — SIGNIFICANT CHANGE UP (ref 3.8–10.5)

## 2019-11-13 PROCEDURE — 93010 ELECTROCARDIOGRAM REPORT: CPT

## 2019-11-13 PROCEDURE — 99284 EMERGENCY DEPT VISIT MOD MDM: CPT

## 2019-11-13 PROCEDURE — 71045 X-RAY EXAM CHEST 1 VIEW: CPT | Mod: 26

## 2019-11-13 PROCEDURE — 71250 CT THORAX DX C-: CPT | Mod: 26

## 2019-11-13 PROCEDURE — 99223 1ST HOSP IP/OBS HIGH 75: CPT

## 2019-11-13 RX ORDER — BUDESONIDE, MICRONIZED 100 %
0.5 POWDER (GRAM) MISCELLANEOUS
Refills: 0 | Status: DISCONTINUED | OUTPATIENT
Start: 2019-11-13 | End: 2019-11-15

## 2019-11-13 RX ORDER — IPRATROPIUM/ALBUTEROL SULFATE 18-103MCG
3 AEROSOL WITH ADAPTER (GRAM) INHALATION EVERY 6 HOURS
Refills: 0 | Status: DISCONTINUED | OUTPATIENT
Start: 2019-11-13 | End: 2019-11-15

## 2019-11-13 RX ORDER — CLOPIDOGREL BISULFATE 75 MG/1
75 TABLET, FILM COATED ORAL DAILY
Refills: 0 | Status: DISCONTINUED | OUTPATIENT
Start: 2019-11-14 | End: 2019-11-15

## 2019-11-13 RX ORDER — ISOSORBIDE DINITRATE 5 MG/1
10 TABLET ORAL THREE TIMES A DAY
Refills: 0 | Status: DISCONTINUED | OUTPATIENT
Start: 2019-11-13 | End: 2019-11-15

## 2019-11-13 RX ORDER — INSULIN LISPRO 100/ML
4 VIAL (ML) SUBCUTANEOUS
Refills: 0 | Status: DISCONTINUED | OUTPATIENT
Start: 2019-11-13 | End: 2019-11-15

## 2019-11-13 RX ORDER — ATORVASTATIN CALCIUM 80 MG/1
40 TABLET, FILM COATED ORAL AT BEDTIME
Refills: 0 | Status: DISCONTINUED | OUTPATIENT
Start: 2019-11-13 | End: 2019-11-15

## 2019-11-13 RX ORDER — SODIUM CHLORIDE 9 MG/ML
1000 INJECTION, SOLUTION INTRAVENOUS
Refills: 0 | Status: DISCONTINUED | OUTPATIENT
Start: 2019-11-13 | End: 2019-11-15

## 2019-11-13 RX ORDER — ASPIRIN/CALCIUM CARB/MAGNESIUM 324 MG
81 TABLET ORAL DAILY
Refills: 0 | Status: DISCONTINUED | OUTPATIENT
Start: 2019-11-13 | End: 2019-11-15

## 2019-11-13 RX ORDER — HYDRALAZINE HCL 50 MG
25 TABLET ORAL THREE TIMES A DAY
Refills: 0 | Status: DISCONTINUED | OUTPATIENT
Start: 2019-11-13 | End: 2019-11-15

## 2019-11-13 RX ORDER — INSULIN LISPRO 100/ML
4 VIAL (ML) SUBCUTANEOUS ONCE
Refills: 0 | Status: DISCONTINUED | OUTPATIENT
Start: 2019-11-13 | End: 2019-11-13

## 2019-11-13 RX ORDER — INSULIN GLARGINE 100 [IU]/ML
12 INJECTION, SOLUTION SUBCUTANEOUS AT BEDTIME
Refills: 0 | Status: DISCONTINUED | OUTPATIENT
Start: 2019-11-13 | End: 2019-11-15

## 2019-11-13 RX ORDER — SODIUM ZIRCONIUM CYCLOSILICATE 10 G/10G
5 POWDER, FOR SUSPENSION ORAL ONCE
Refills: 0 | Status: DISCONTINUED | OUTPATIENT
Start: 2019-11-13 | End: 2019-11-14

## 2019-11-13 RX ORDER — GLUCAGON INJECTION, SOLUTION 0.5 MG/.1ML
1 INJECTION, SOLUTION SUBCUTANEOUS ONCE
Refills: 0 | Status: DISCONTINUED | OUTPATIENT
Start: 2019-11-13 | End: 2019-11-15

## 2019-11-13 RX ORDER — SEVELAMER CARBONATE 2400 MG/1
800 POWDER, FOR SUSPENSION ORAL
Refills: 0 | Status: DISCONTINUED | OUTPATIENT
Start: 2019-11-13 | End: 2019-11-15

## 2019-11-13 RX ORDER — PANTOPRAZOLE SODIUM 20 MG/1
40 TABLET, DELAYED RELEASE ORAL
Refills: 0 | Status: DISCONTINUED | OUTPATIENT
Start: 2019-11-13 | End: 2019-11-15

## 2019-11-13 RX ORDER — DEXTROSE 50 % IN WATER 50 %
25 SYRINGE (ML) INTRAVENOUS ONCE
Refills: 0 | Status: DISCONTINUED | OUTPATIENT
Start: 2019-11-13 | End: 2019-11-15

## 2019-11-13 RX ORDER — DEXTROSE 50 % IN WATER 50 %
12.5 SYRINGE (ML) INTRAVENOUS ONCE
Refills: 0 | Status: DISCONTINUED | OUTPATIENT
Start: 2019-11-13 | End: 2019-11-15

## 2019-11-13 RX ORDER — INSULIN LISPRO 100/ML
VIAL (ML) SUBCUTANEOUS
Refills: 0 | Status: DISCONTINUED | OUTPATIENT
Start: 2019-11-13 | End: 2019-11-15

## 2019-11-13 RX ORDER — METOPROLOL TARTRATE 50 MG
50 TABLET ORAL DAILY
Refills: 0 | Status: DISCONTINUED | OUTPATIENT
Start: 2019-11-13 | End: 2019-11-15

## 2019-11-13 RX ORDER — INSULIN LISPRO 100/ML
VIAL (ML) SUBCUTANEOUS AT BEDTIME
Refills: 0 | Status: DISCONTINUED | OUTPATIENT
Start: 2019-11-13 | End: 2019-11-15

## 2019-11-13 RX ORDER — DEXTROSE 50 % IN WATER 50 %
15 SYRINGE (ML) INTRAVENOUS ONCE
Refills: 0 | Status: DISCONTINUED | OUTPATIENT
Start: 2019-11-13 | End: 2019-11-15

## 2019-11-13 RX ORDER — OXYCODONE AND ACETAMINOPHEN 5; 325 MG/1; MG/1
1 TABLET ORAL EVERY 4 HOURS
Refills: 0 | Status: DISCONTINUED | OUTPATIENT
Start: 2019-11-13 | End: 2019-11-15

## 2019-11-13 RX ORDER — INSULIN LISPRO 100/ML
6 VIAL (ML) SUBCUTANEOUS ONCE
Refills: 0 | Status: COMPLETED | OUTPATIENT
Start: 2019-11-13 | End: 2019-11-13

## 2019-11-13 RX ORDER — HEPARIN SODIUM 5000 [USP'U]/ML
5000 INJECTION INTRAVENOUS; SUBCUTANEOUS THREE TIMES A DAY
Refills: 0 | Status: DISCONTINUED | OUTPATIENT
Start: 2019-11-13 | End: 2019-11-15

## 2019-11-13 RX ADMIN — OXYCODONE AND ACETAMINOPHEN 1 TABLET(S): 5; 325 TABLET ORAL at 22:35

## 2019-11-13 RX ADMIN — Medication 6 UNIT(S): at 20:31

## 2019-11-13 RX ADMIN — OXYCODONE AND ACETAMINOPHEN 1 TABLET(S): 5; 325 TABLET ORAL at 23:05

## 2019-11-13 RX ADMIN — INSULIN GLARGINE 12 UNIT(S): 100 INJECTION, SOLUTION SUBCUTANEOUS at 22:35

## 2019-11-13 NOTE — H&P ADULT - PROBLEM SELECTOR PLAN 1
- with hyperglycemia s/p humalog 6  - recheck  - give home insulin  - tele  - will give lokelma as well

## 2019-11-13 NOTE — ED ADULT NURSE NOTE - OBJECTIVE STATEMENT
patient is a 64 year old female who has a PMH of diabetes, COPD, CHF who presents to the ED via EMS complaining of SOB. patient was tripoding upon arrival to the unit. patient was having a difficult time breathing. patient was placed on 2L NC. patient states that she is unable to take deep breaths and is not on oxygen at home because she cant afford it. patient states she is also in pain in her lower extremities due to her diabetic neuropathy. both legs are swollen and painful to touch, but patient states this is her baseline. as per EMS patients blood sugar was unreadable intransit. patient was given 500ml of fluids and upon arrival to the ED her fingerstick was 336. patient states she goes to dialysis 4 times a week and last round of dialysis was yesterday. patient has a glucose reader in her right arm.  patient is aaox3, lung sounds diminished bilaterally, abdomen soft, nondistended, nontender. patient denies chest pain, fever chills, n/v/d, HA, dizziness, weakness, numbness or tingling, cough, dysuria, changes in bowel movements. patient is resting comfortably on the stretcher. MD at bedside. patient is not currently experiencing any pain/discomfort. will continue to monitor. EKG done. VSS. patient is a 64 year old female who has a PMH of diabetes, COPD, CHF who presents to the ED via EMS complaining of SOB. patient was tripoding upon arrival to the unit. patient was having a difficult time breathing. patient was placed on 2L NC. patient states that she is unable to take deep breaths and is not on oxygen at home because she cant afford it. patient states she is also in pain in her lower extremities due to her diabetic neuropathy. both legs are swollen and painful to touch, but patient states this is her baseline. as per EMS patients blood sugar was unreadable intransit. patient was given 500ml of fluids and upon arrival to the ED her fingerstick was 336. patient states she goes to dialysis 4 times a week and last round of dialysis was yesterday and was completed. fistula noted to left arm. bruit and thrill noted. patient has a glucose reader in her right arm- not insulin pump as per patient, patient takes insulin pen. patient is aaox3, lung sounds diminished bilaterally, abdomen soft, nondistended, nontender. patient denies chest pain, fever chills, n/v/d, HA, dizziness, weakness, numbness or tingling, cough, dysuria, changes in bowel movements. patient is resting comfortably on the stretcher. MD at bedside. patient is not currently experiencing any pain/discomfort. will continue to monitor. EKG done. VSS.

## 2019-11-13 NOTE — H&P ADULT - PROBLEM SELECTOR PLAN 5
- pt has chronic hsTROP elevation, see prior trops, 400s in Sept, 200s in July.  - pt has known occlusive CAD on medical management  - cont home asa/plavix, lipitor, BB, bp meds  - trend CE  - tele  - TTE

## 2019-11-13 NOTE — H&P ADULT - NSHPREVIEWOFSYSTEMS_GEN_ALL_CORE
REVIEW OF SYSTEMS:  CONSTITUTIONAL: +weakness. No fevers. +chills. No rigors. No weight loss. No night sweats. No poor appetite.  EYES: No visual changes. No eye pain.  ENT: No hearing difficulty. No vertigo. No dysphagia. No sore throat. No Sinusitis/rhinorrhea.   NECK: No pain. No stiffness/rigidity.  CARDIAC: No chest pain. No palpitations. No lightheadedness.  RESPIRATORY: +cough. +SOB. No hemoptysis.  GASTROINTESTINAL: No abdominal pain. No nausea. No vomiting. No hematemesis. No diarrhea. No constipation. No melena. No hematochezia.  GENITOURINARY: No dysuria. No frequency. No hesitancy. No hematuria. No oliguria.  NEUROLOGICAL: No numbness/tingling. No focal weakness. No urinary or fecal incontinence. No headache. +unsteady gait.  BACK: No back pain. No flank pain.  EXTREMITIES: +Left lower extremity edema. Full ROM. No joint pain.  SKIN: No rashes. No itching. No other lesions.  ALLERGIC: No lip swelling. No hives.  All other review of systems is negative unless indicated above.  Unless indicated above, unable to assess ROS 2/2

## 2019-11-13 NOTE — CONSULT NOTE ADULT - ASSESSMENT
64F c hx CAD (Cath Feb '19 showing 99% RCA, 100% RPLS, 100% Cx) s/p multiple stents/brachytherapy, ESRD (on HD M/T/T/Sa), DM1 c/b neuropathy and retinopathy, CHF (EF 30% per last [facility]), mod MR, severe AS, RHF, TIA, severe PVD s/p b/l fempop bypass c/b left thrombosed graft, left subclavian vein stenosis s/p stent, COPD not on O2, gout, hilar lymphadenopathy of unknown etiology, frequent hospitalizations (usually 1-3 times a month) pw SOB, coughing for 2 weeks. last hd yesterday admitted with sob and hyperkalemia      1- hyperkalemia  2- chf  3- HTN hx  4- ESRD  5- SHPT  6- anemia     hd consent obtained witnessed and placed in chart  hd arrangement made with HD RN  cont hydralazine  renvela one tab with meals   cont O2. check official ct chest report  hb > 10 with next hd will start procrit

## 2019-11-13 NOTE — ED PROVIDER NOTE - ATTENDING CONTRIBUTION TO CARE
MRI LEFT KNEE WITHOUT CONTRAST:

 

Date:  03/01/18 

 

HISTORY:  

Quadriceps injury, S76.112A, strain of left quadriceps muscle and fascia. 

 

COMPARISON:  

Knee radiographs dated 01/23/18. 

 

FINDINGS:

There is a full thickness structure of the quadriceps tendon with differential retraction. The fibers
 from the vastus intermedius are retracted approximately 2.0 cm with the large full thickness defect 
of the rectus femoris retracted over 6.0 cm and vastus lateralis retracted only approximately 2.0 cm.
 There is severe tendinosis of the quadriceps tendon. 

 

There are extensive vascular calcifications. Large joint effusion. 

 

No significant muscle atrophy, although there is retraction of the rectus femoris and direct head mus
culature. 

 

IMPRESSION: 

Full thickness rupture of the quadriceps tendon with the vastus lateralis and vastus medialis tendon 
retracted approximately 2.0 cm although the rectus femoris is more retracted approximately 6.0 cm. Th
e fibers are severely tendinotic. 

 

 

POS: [facility] Ted Rodriguez  63 yo F with ESRD (on HD M/Tu/Th/Sa), type 1 DM w/DKA in past on humalog and lantus , CAD, HFrEF (EF 50% on TTE from 10/18), TIA, and PVD, COPD presented to the ED with acute onset SOB with no resp distress on arrival, vss, lungs cta b/l, uti sts this past week, denies f/c.  elevated glucose at home with dm over 500 here 330 range, dka work up, pna work up, hd yesterday, l sided rhonchi r/o pna. doesn't meet SS criteria.

## 2019-11-13 NOTE — ED PROVIDER NOTE - OBJECTIVE STATEMENT
63 yo F with ESRD (on HD M/Tu/Th/Sa), type 1 DM w/DKA in past on humalog and lantus , CAD, HFrEF (EF 50% on TTE from 10/18), TIA, and PVD, COPD presented to the ED with acute onset SOB this evening and LIPSCOMB. EMS was called and stated pt FS >500 upon arrival and was give 500cc NS bolus. Patient completed dialysis yesterday. Patient reported chronic pitting edema and does not make much urine. Patient denied CP, abdominal pain, N/V/D, fever chills, headache, cough

## 2019-11-13 NOTE — H&P ADULT - NSHPLABSRESULTS_GEN_ALL_CORE
Personally reviewed labs.   Personally reviewed imaging.   Personally reviewed EKG. NSR, rate 80, . Q wave in AVR/V1. TWI in V5-V6.                          10.2   5.03  )-----------( 217      ( 13 Nov 2019 18:44 )             31.4       11-13    133<L>  |  89<L>  |  40<H>  ----------------------------<  335<H>  5.8<H>   |  27  |  6.20<H>    Ca    10.3      13 Nov 2019 18:44  Phos  6.1     11-13  Mg     2.6     11-13    TPro  8.4<H>  /  Alb  5.0  /  TBili  0.2  /  DBili  x   /  AST  21  /  ALT  15  /  AlkPhos  111  11-13            LIVER FUNCTIONS - ( 13 Nov 2019 18:44 )  Alb: 5.0 g/dL / Pro: 8.4 g/dL / ALK PHOS: 111 U/L / ALT: 15 U/L / AST: 21 U/L / GGT: x             PT/INR - ( 13 Nov 2019 18:44 )   PT: 12.1 sec;   INR: 1.06 ratio         PTT - ( 13 Nov 2019 18:44 )  PTT:33.9 sec

## 2019-11-13 NOTE — CONSULT NOTE ADULT - SUBJECTIVE AND OBJECTIVE BOX
[facility]  [phone]  NEPHROLOGY      INITIAL CONSULT NOTE  --------------------------------------------------------------------------------  HPI:      64F c hx CAD (Cath Feb '19 showing 99% RCA, 100% RPLS, 100% Cx) s/p multiple stents/brachytherapy, ESRD (on HD M/T/T/Sa), DM1 c/b neuropathy and retinopathy, CHF (EF 30% per last [facility]), mod MR, severe AS, RHF, TIA, severe PVD s/p b/l fempop bypass c/b left thrombosed graft, left subclavian vein stenosis s/p stent, COPD not on O2, gout, hilar lymphadenopathy of unknown etiology, frequent hospitalizations (usually 1-3 times a month) pw SOB, coughing for 2 weeks. Pt reports 2-3 days of worsening SOB, with chronic productive cough. Pt states she took her nebuilizer and went to bed, but couldn't sleep 2/2 SOB. Per EMS, but had FSBS in the 500s. Pt reports good compliance with medications and doesn't miss insulin doses. Last HD session was yesterday. CT scans in the past have shown persistent lower lobe consolidation and upper lobe GGO of unclear etiology.  noticed with hyperkalemia renal consult called.       PAST HISTORY  --------------------------------------------------------------------------------  PAST MEDICAL & SURGICAL HISTORY:  COPD (chronic obstructive pulmonary disease)  Localized enlarged lymph nodes  CHF (congestive heart failure): EF 40-45%  Subclavian vein stenosis, left: s/p stent  DKA, type 1: 1/2015  ACS (acute coronary syndrome): 1/2015 - cath revealed 100% ostial stenosis not amenable to PCI - medical management  TIA (transient ischemic attack): x 2 - 8-9 years ago prior to ASD/VSD repair  CAD (coronary artery disease): s/p stents  Gout: past  CVA (cerebral infarction): with no residual, 8 yrs ago, prior to heart surgery - ST memory loss  Peripheral vascular disease: occluded left fem-pop bypass 5/2015  Diabetes mellitus type 1: Insulin Dependent -  ESRD (end stage renal disease): dialysis  M, tue, thursday, saturday  Hyperlipidemia  Status post device closure of ASD: &quot;yvettehell&quot;  History of cardiac catheterization: 1/2015 - no intervention  S/P femoral-popliteal bypass surgery: L and R in 2013 with graft; 5/2015 CFA angioplasty left and ileofemoral endarterectomywith vein patch angioplasty of left fem-pop bypass graft  Multiple vascular surgery both leg, left fempop bypass revision 11/2015  AV (arteriovenous fistula): Left AV graft; revision with stent placement 2-3 years ago  S/P cholecystectomy    FAMILY HISTORY:  Family history of smoking  Family history of hypertension  Family history of cancer (Sibling)    PAST SOCIAL HISTORY: past tobacco use     ALLERGIES & MEDICATIONS  --------------------------------------------------------------------------------  Allergies    No Known Allergies    Intolerances      Standing Inpatient Medications  albuterol/ipratropium for Nebulization 3 milliLiter(s) Nebulizer every 6 hours  aspirin enteric coated 81 milliGRAM(s) Oral daily  atorvastatin 40 milliGRAM(s) Oral at bedtime  buDESOnide    Inhalation Suspension 0.5 milliGRAM(s) Inhalation two times a day  dextrose 5%. 1000 milliLiter(s) IV Continuous <Continuous>  dextrose 50% Injectable 12.5 Gram(s) IV Push once  dextrose 50% Injectable 25 Gram(s) IV Push once  dextrose 50% Injectable 25 Gram(s) IV Push once  heparin  Injectable 5000 Unit(s) SubCutaneous three times a day  insulin glargine Injectable (LANTUS) 12 Unit(s) SubCutaneous at bedtime  insulin lispro (HumaLOG) corrective regimen sliding scale   SubCutaneous three times a day before meals  insulin lispro (HumaLOG) corrective regimen sliding scale   SubCutaneous at bedtime  insulin lispro Injectable (HumaLOG) 4 Unit(s) SubCutaneous three times a day before meals  isosorbide   dinitrate Tablet (ISORDIL) 10 milliGRAM(s) Oral three times a day  metoprolol succinate ER 50 milliGRAM(s) Oral daily  pantoprazole    Tablet 40 milliGRAM(s) Oral before breakfast  sevelamer carbonate 800 milliGRAM(s) Oral three times a day with meals  sodium zirconium cyclosilicate 5 Gram(s) Oral once    PRN Inpatient Medications  dextrose 40% Gel 15 Gram(s) Oral once PRN  glucagon  Injectable 1 milliGRAM(s) IntraMuscular once PRN  hydrALAZINE 25 milliGRAM(s) Oral three times a day PRN  oxycodone    5 mG/acetaminophen 325 mG 1 Tablet(s) Oral every 4 hours PRN      REVIEW OF SYSTEMS  --------------------------------------------------------------------------------  Gen: No  fevers/chills  + chronic   Skin: No rashes  Head/Eyes/Ears/Mouth: No headache; Normal hearing;  No sinus pain/discomfort, sore throat  Respiratory:  +  dyspnea, +  cough,  - wheezing  CV: No chest pain, or palp   GI: No abdominal pain, diarrhea, nausea, vomiting, melena  : No dysuria, + decrease urination -  hematuria  MSK: No joint pain/swelling; no back pain  Neuro: No dizziness/lightheadedness  Heme: No easy bruising or bleeding  Endo: No heat/cold intolerance  Psych: No significant nervousness, anxiety or depression  also with no edema     All other systems were reviewed and are negative, except as noted.    VITALS/PHYSICAL EXAM  --------------------------------------------------------------------------------  T(C): 36.8 (11-13-19 @ 23:10), Max: 36.8 (11-13-19 @ 23:10)  HR: 72 (11-13-19 @ 23:10) (72 - 77)  BP: 122/64 (11-13-19 @ 23:10) (122/64 - 157/76)  RR: 18 (11-13-19 @ 23:10) (16 - 19)  SpO2: 98% (11-13-19 @ 23:10) (98% - 100%)  Wt(kg): --  Height (cm): 162.56 (11-13-19 @ 17:45)  Weight (kg): 56.3 (11-13-19 @ 17:45)  BMI (kg/m2): 21.3 (11-13-19 @ 17:45)  BSA (m2): 1.6 (11-13-19 @ 17:45)      Physical Exam:  	Gen: mild respiratory distress   	no jvd , supple neck,   	Pulm: decrease bs  no rales or ronchi  + insp wheezing  	CV: RRR, S1S2; no rub  	Back: No CVA tendernes  	Abd: +BS, soft, nontender/nondistended  	: No suprapubic tenderness  	UE: Warm, no cyanosis  no clubbing,  no edema; no asterixis  	LE: Warm, no cyanosis  no clubbing, LLE 2-  edema  	Neuro: alert and oriented. speech coherent   	Skin: Warm, no decrease skin turgor   	Vascular access: + avf LUE + bruit and thrill     LABS/STUDIES  --------------------------------------------------------------------------------              10.2   5.03  >-----------<  217      [11-13-19 @ 18:44]              31.4     138  |  93  |  44  ----------------------------<  116      [11-13-19 @ 23:01]  6.0   |  28  |  6.42        Ca     9.2     [11-13-19 @ 23:01]      Mg     2.6     [11-13-19 @ 18:44]      Phos  6.1     [11-13-19 @ 18:44]    TPro  8.4  /  Alb  5.0  /  TBili  0.2  /  DBili  x   /  AST  21  /  ALT  15  /  AlkPhos  111  [11-13-19 @ 18:44]    PT/INR: PT 12.1 , INR 1.06       [11-13-19 @ 18:44]  PTT: 33.9       [11-13-19 @ 18:44]          [11-13-19 @ 23:01]    Creatinine Trend:  SCr 6.42 [11-13 @ 23:01]  SCr 6.20 [11-13 @ 18:44]    Urinalysis - [06-04-17 @ 08:24]      Color Yellow / Appearance Clear / SG 1.013 / pH 8.5      Gluc 1000 / Ketone Negative  / Bili Negative / Urobili Negative       Blood Negative / Protein >600 / Leuk Est Small / Nitrite Negative      RBC 3-5 / WBC 6-10 / Hyaline  / Gran  / Sq Epi  / Non Sq Epi OCC / Bacteria       Iron 42, TIBC 253, %sat 17      [06-17-19 @ 17:54]  Ferritin 1838      [06-17-19 @ 18:06]  PTH -- (Ca 9.6)      [06-17-19 @ 18:06]   177  PTH -- (Ca 7.8)      [03-29-19 @ 20:38]   73  PTH -- (Ca 7.3)      [02-22-19 @ 02:49]   59  HbA1c 8.1      [09-16-19 @ 08:04]  TSH 0.71      [11-17-18 @ 08:25]    HBsAb <3.0      [09-15-19 @ 20:15]  HBsAb Nonreact      [09-15-19 @ 20:15]  HBsAg Nonreact      [09-15-19 @ 20:15]  HBcAb Reactive      [09-15-19 @ 20:15]  HCV 0.13, Nonreact      [09-15-19 @ 20:15]

## 2019-11-13 NOTE — H&P ADULT - HISTORY OF PRESENT ILLNESS
64F c hx CAD (Cath Feb '19 showing 99% RCA, 100% RPLS, 100% Cx) s/p multiple stents/brachytherapy, ESRD (on HD M/T/T/Sa), DM1 c/b neuropathy and retinopathy, CHF (EF 30% per last [facility]), mod MR, severe AS, RHF, TIA, severe PVD s/p b/l fempop bypass c/b left thrombosed graft, left subclavian vein stenosis s/p stent, COPD not on O2, gout, hilar lymphadenopathy of unknown etiology, frequent hospitalizations (usually 1-3 times a month) pw SOB, coughing for 2 weeks.    Pt reports 2-3 days of worsening SOB, with chronic productive cough. Pt states she took her nebuilizer and went to bed, but couldn't sleep 2/2 SOB. Per EMS, but had FSBS in the 500s. Pt reports good compliance with medications and doesn't miss insulin doses. Last HD session was yesterday. At baseline, pt able to ambulate short distances with her cane. Pt does not take diuretics. CT scans in the past have shown persistent lower lobe consolidation and upper lobe GGO of unclear etiology. Pt denies fever, chest pain, palpitations. Other ROS as below.    ISTOP Reference #: [id number]  Rx Written	Rx Dispensed	Drug	Quantity	Days Supply	Prescriber Name  10/15/2019	10/15/2019	oxycodone-acetaminophen 5-325 mg tab	60	30	Tee Biswas MD  08/21/2019	08/21/2019	oxycodone-acetaminophen 5-325 mg tab	60	30	Tee Biswas MD 64F c hx CAD (Cath Feb '19 showing 99% RCA, 100% RPLS, 100% Cx) s/p multiple stents/brachytherapy, ESRD (on HD M/T/T/Sa), DM1 c/b neuropathy and retinopathy, CHF (EF 30% per last [facility]), mod MR, severe AS, RHF, TIA, severe PVD s/p b/l fempop bypass c/b left thrombosed graft, left subclavian vein stenosis s/p stent, COPD not on O2, gout, hilar lymphadenopathy of unknown etiology, frequent hospitalizations (usually 1-3 times a month) pw SOB, coughing for 2 weeks.    Pt reports 2-3 days of worsening SOB, with chronic productive cough. Pt states she took her nebuilizer and went to bed, but couldn't sleep 2/2 SOB. Per EMS, but had FSBS in the 500s. Pt reports good compliance with medications and doesn't miss insulin doses. Last HD session was yesterday. At baseline, pt able to ambulate short distances with her cane. Pt does not take diuretics. CT scans in the past have shown persistent lower lobe consolidation and upper lobe GGO of unclear etiology. Pt denies fever, chest pain, palpitations. Other ROS as below.    VS: Tm 98.1, P 77, /77, R 19, 99% RA  In the ED, received humalog 6    ISTOP Reference #: [id number]  Rx Written	Rx Dispensed	Drug	Quantity	Days Supply	Prescriber Name  10/15/2019	10/15/2019	oxycodone-acetaminophen 5-325 mg tab	60	30	Tee Biswas MD  08/21/2019	08/21/2019	oxycodone-acetaminophen 5-325 mg tab	60	30	Tee Biswas MD

## 2019-11-13 NOTE — H&P ADULT - ASSESSMENT
64F c hx CAD (Cath Feb '19 showing 99% RCA, 100% RPLS, 100% Cx) s/p multiple stents/brachytherapy, ESRD (on HD M/T/T/Sa), DM1 c/b neuropathy and retinopathy, CHF (EF 30% per last [facility]), mod MR, severe AS, RHF, TIA, severe PVD s/p b/l fempop bypass c/b left thrombosed graft, left subclavian vein stenosis s/p stent, COPD not on O2, gout, hilar lymphadenopathy of unknown etiology, frequent hospitalizations (usually 1-3 times a month) pw SOB, cough, hyperkalemia

## 2019-11-13 NOTE — ED PROVIDER NOTE - PROGRESS NOTE DETAILS
received s/o on pt. Patient reassessed, NAD, non-toxic appearing. results dw pt, questions answered. no signs of hyperK on ekg. will tx w/ insulin due to hyperglycemia and hyperK. endorsed to dr muller. dr mildred chaudhry.  - Addison Mata D.O. PGY2

## 2019-11-13 NOTE — H&P ADULT - NSHPPHYSICALEXAM_GEN_ALL_CORE
PHYSICAL EXAM:   GENERAL: Alert. Not confused. No acute distress. Not thin. Not cachectic. Not obese.  HEAD:  Atraumatic. Normocephalic.  EYES: EOMI. PERRLA. Normal conjunctiva/sclera.  ENT: Neck supple. No JVD. Moist oral mucosa. Not edentulous. No thrush.  LYMPH: Normal supraclavicular/cervical lymph nodes.   CARDIAC: Not tachy, Not chauncey. Regular rhythm. Not irregularly irregular. S1. S2. +grade 3 systolic murmur. No rub. No distant heart sounds.  LUNG/CHEST: BS equal bilaterally. +wheezes b/l. No rales. No rhonchi.  ABDOMEN: Soft. No tenderness. No distension. No fluid wave. Normal bowel sounds.  BACK: No midline/vertebral tenderness. No flank tenderness.  VASCULAR: +2 b/l radial or ulnar pulses. Palpable DP pulses.  EXTREMITIES:  No clubbing. No cyanosis. +1-2 LLE edema. Moving all 4.  NEUROLOGY: A&Ox2. Non-focal exam. Cranial nerves intact. Normal speech. Sensation intact.  PSYCH: Normal behavior. Flat affect.  SKIN: No jaundice. No erythema. No rash/lesion.  Vascular Access:     ICU Vital Signs Last 24 Hrs  T(C): 36.5 (13 Nov 2019 19:21), Max: 36.7 (13 Nov 2019 17:45)  T(F): 97.7 (13 Nov 2019 19:21), Max: 98.1 (13 Nov 2019 18:01)  HR: 77 (13 Nov 2019 19:21) (73 - 77)  BP: 148/74 (13 Nov 2019 19:21) (137/77 - 157/76)  BP(mean): --  ABP: --  ABP(mean): --  RR: 19 (13 Nov 2019 19:21) (16 - 19)  SpO2: 100% (13 Nov 2019 19:21) (99% - 100%)      I&O's Summary

## 2019-11-13 NOTE — ED PROVIDER NOTE - CARE PLAN
Principal Discharge DX:	Failure to thrive in adult Principal Discharge DX:	Hyperkalemia  Secondary Diagnosis:	Hyperglycemia  Secondary Diagnosis:	CHF exacerbation

## 2019-11-14 DIAGNOSIS — E10.65 TYPE 1 DIABETES MELLITUS WITH HYPERGLYCEMIA: ICD-10-CM

## 2019-11-14 DIAGNOSIS — E78.5 HYPERLIPIDEMIA, UNSPECIFIED: ICD-10-CM

## 2019-11-14 DIAGNOSIS — I10 ESSENTIAL (PRIMARY) HYPERTENSION: ICD-10-CM

## 2019-11-14 LAB
ALBUMIN SERPL ELPH-MCNC: 3.8 G/DL — SIGNIFICANT CHANGE UP (ref 3.3–5)
ALP SERPL-CCNC: 85 U/L — SIGNIFICANT CHANGE UP (ref 40–120)
ALT FLD-CCNC: 13 U/L — SIGNIFICANT CHANGE UP (ref 10–45)
ANION GAP SERPL CALC-SCNC: 12 MMOL/L — SIGNIFICANT CHANGE UP (ref 5–17)
ANION GAP SERPL CALC-SCNC: 16 MMOL/L — SIGNIFICANT CHANGE UP (ref 5–17)
APTT BLD: 30 SEC — SIGNIFICANT CHANGE UP (ref 27.5–36.3)
AST SERPL-CCNC: 19 U/L — SIGNIFICANT CHANGE UP (ref 10–40)
BASOPHILS # BLD AUTO: 0.03 K/UL — SIGNIFICANT CHANGE UP (ref 0–0.2)
BASOPHILS NFR BLD AUTO: 0.7 % — SIGNIFICANT CHANGE UP (ref 0–2)
BILIRUB SERPL-MCNC: 0.2 MG/DL — SIGNIFICANT CHANGE UP (ref 0.2–1.2)
BUN SERPL-MCNC: 11 MG/DL — SIGNIFICANT CHANGE UP (ref 7–23)
BUN SERPL-MCNC: 24 MG/DL — HIGH (ref 7–23)
CALCIUM SERPL-MCNC: 8.9 MG/DL — SIGNIFICANT CHANGE UP (ref 8.4–10.5)
CALCIUM SERPL-MCNC: 9 MG/DL — SIGNIFICANT CHANGE UP (ref 8.4–10.5)
CHLORIDE SERPL-SCNC: 94 MMOL/L — LOW (ref 96–108)
CHLORIDE SERPL-SCNC: 95 MMOL/L — LOW (ref 96–108)
CHOLEST SERPL-MCNC: 108 MG/DL — SIGNIFICANT CHANGE UP (ref 10–199)
CK MB BLD-MCNC: 1.8 % — SIGNIFICANT CHANGE UP (ref 0–3.5)
CK MB CFR SERPL CALC: 8.2 NG/ML — HIGH (ref 0–3.8)
CK SERPL-CCNC: 456 U/L — HIGH (ref 25–170)
CO2 SERPL-SCNC: 26 MMOL/L — SIGNIFICANT CHANGE UP (ref 22–31)
CO2 SERPL-SCNC: 28 MMOL/L — SIGNIFICANT CHANGE UP (ref 22–31)
CREAT SERPL-MCNC: 3.27 MG/DL — HIGH (ref 0.5–1.3)
CREAT SERPL-MCNC: 4.52 MG/DL — HIGH (ref 0.5–1.3)
EOSINOPHIL # BLD AUTO: 0.2 K/UL — SIGNIFICANT CHANGE UP (ref 0–0.5)
EOSINOPHIL NFR BLD AUTO: 4.7 % — SIGNIFICANT CHANGE UP (ref 0–6)
GAS PNL BLDV: SIGNIFICANT CHANGE UP
GLUCOSE BLDC GLUCOMTR-MCNC: 152 MG/DL — HIGH (ref 70–99)
GLUCOSE BLDC GLUCOMTR-MCNC: 182 MG/DL — HIGH (ref 70–99)
GLUCOSE BLDC GLUCOMTR-MCNC: 278 MG/DL — HIGH (ref 70–99)
GLUCOSE BLDC GLUCOMTR-MCNC: 345 MG/DL — HIGH (ref 70–99)
GLUCOSE BLDC GLUCOMTR-MCNC: 376 MG/DL — HIGH (ref 70–99)
GLUCOSE SERPL-MCNC: 258 MG/DL — HIGH (ref 70–99)
GLUCOSE SERPL-MCNC: 443 MG/DL — HIGH (ref 70–99)
HBA1C BLD-MCNC: 8.7 % — HIGH (ref 4–5.6)
HBV CORE AB SER-ACNC: SIGNIFICANT CHANGE UP
HBV CORE IGM SER-ACNC: SIGNIFICANT CHANGE UP
HBV SURFACE AB SER-ACNC: <3 MIU/ML — LOW
HBV SURFACE AB SER-ACNC: <3 MIU/ML — LOW
HBV SURFACE AB SER-ACNC: SIGNIFICANT CHANGE UP
HBV SURFACE AG SER-ACNC: SIGNIFICANT CHANGE UP
HCT VFR BLD CALC: 27.2 % — LOW (ref 34.5–45)
HCV AB S/CO SERPL IA: 0.15 S/CO — SIGNIFICANT CHANGE UP (ref 0–0.99)
HCV AB SERPL-IMP: SIGNIFICANT CHANGE UP
HDLC SERPL-MCNC: 57 MG/DL — SIGNIFICANT CHANGE UP
HGB BLD-MCNC: 8.5 G/DL — LOW (ref 11.5–15.5)
IMM GRANULOCYTES NFR BLD AUTO: 0.2 % — SIGNIFICANT CHANGE UP (ref 0–1.5)
INR BLD: 1.05 RATIO — SIGNIFICANT CHANGE UP (ref 0.88–1.16)
LIPID PNL WITH DIRECT LDL SERPL: 36 MG/DL — SIGNIFICANT CHANGE UP
LYMPHOCYTES # BLD AUTO: 0.84 K/UL — LOW (ref 1–3.3)
LYMPHOCYTES # BLD AUTO: 19.8 % — SIGNIFICANT CHANGE UP (ref 13–44)
MAGNESIUM SERPL-MCNC: 2.2 MG/DL — SIGNIFICANT CHANGE UP (ref 1.6–2.6)
MCHC RBC-ENTMCNC: 31.3 GM/DL — LOW (ref 32–36)
MCHC RBC-ENTMCNC: 33.2 PG — SIGNIFICANT CHANGE UP (ref 27–34)
MCV RBC AUTO: 106.3 FL — HIGH (ref 80–100)
MONOCYTES # BLD AUTO: 0.54 K/UL — SIGNIFICANT CHANGE UP (ref 0–0.9)
MONOCYTES NFR BLD AUTO: 12.7 % — SIGNIFICANT CHANGE UP (ref 2–14)
NEUTROPHILS # BLD AUTO: 2.62 K/UL — SIGNIFICANT CHANGE UP (ref 1.8–7.4)
NEUTROPHILS NFR BLD AUTO: 61.9 % — SIGNIFICANT CHANGE UP (ref 43–77)
PHOSPHATE SERPL-MCNC: 5.2 MG/DL — HIGH (ref 2.5–4.5)
PLATELET # BLD AUTO: 200 K/UL — SIGNIFICANT CHANGE UP (ref 150–400)
POTASSIUM SERPL-MCNC: 4.7 MMOL/L — SIGNIFICANT CHANGE UP (ref 3.5–5.3)
POTASSIUM SERPL-MCNC: 6.1 MMOL/L — HIGH (ref 3.5–5.3)
POTASSIUM SERPL-SCNC: 4.7 MMOL/L — SIGNIFICANT CHANGE UP (ref 3.5–5.3)
POTASSIUM SERPL-SCNC: 6.1 MMOL/L — HIGH (ref 3.5–5.3)
PROT SERPL-MCNC: 6.4 G/DL — SIGNIFICANT CHANGE UP (ref 6–8.3)
PROTHROM AB SERPL-ACNC: 12 SEC — SIGNIFICANT CHANGE UP (ref 10–13.1)
RBC # BLD: 2.56 M/UL — LOW (ref 3.8–5.2)
RBC # FLD: 13.5 % — SIGNIFICANT CHANGE UP (ref 10.3–14.5)
SODIUM SERPL-SCNC: 134 MMOL/L — LOW (ref 135–145)
SODIUM SERPL-SCNC: 137 MMOL/L — SIGNIFICANT CHANGE UP (ref 135–145)
TOTAL CHOLESTEROL/HDL RATIO MEASUREMENT: 1.9 RATIO — LOW (ref 3.3–7.1)
TRIGL SERPL-MCNC: 76 MG/DL — SIGNIFICANT CHANGE UP (ref 10–149)
TROPONIN T, HIGH SENSITIVITY RESULT: 308 NG/L — HIGH (ref 0–51)
TSH SERPL-MCNC: 1.78 UIU/ML — SIGNIFICANT CHANGE UP (ref 0.27–4.2)
WBC # BLD: 4.24 K/UL — SIGNIFICANT CHANGE UP (ref 3.8–10.5)
WBC # FLD AUTO: 4.24 K/UL — SIGNIFICANT CHANGE UP (ref 3.8–10.5)

## 2019-11-14 PROCEDURE — 99223 1ST HOSP IP/OBS HIGH 75: CPT

## 2019-11-14 PROCEDURE — 93306 TTE W/DOPPLER COMPLETE: CPT | Mod: 26

## 2019-11-14 RX ORDER — SODIUM ZIRCONIUM CYCLOSILICATE 10 G/10G
10 POWDER, FOR SUSPENSION ORAL ONCE
Refills: 0 | Status: COMPLETED | OUTPATIENT
Start: 2019-11-14 | End: 2019-11-14

## 2019-11-14 RX ORDER — INSULIN LISPRO 100/ML
5 VIAL (ML) SUBCUTANEOUS ONCE
Refills: 0 | Status: COMPLETED | OUTPATIENT
Start: 2019-11-14 | End: 2019-11-14

## 2019-11-14 RX ADMIN — Medication 4 UNIT(S): at 11:48

## 2019-11-14 RX ADMIN — SEVELAMER CARBONATE 800 MILLIGRAM(S): 2400 POWDER, FOR SUSPENSION ORAL at 17:17

## 2019-11-14 RX ADMIN — OXYCODONE AND ACETAMINOPHEN 1 TABLET(S): 5; 325 TABLET ORAL at 06:29

## 2019-11-14 RX ADMIN — SODIUM ZIRCONIUM CYCLOSILICATE 10 GRAM(S): 10 POWDER, FOR SUSPENSION ORAL at 10:01

## 2019-11-14 RX ADMIN — ISOSORBIDE DINITRATE 10 MILLIGRAM(S): 5 TABLET ORAL at 21:50

## 2019-11-14 RX ADMIN — ISOSORBIDE DINITRATE 10 MILLIGRAM(S): 5 TABLET ORAL at 14:50

## 2019-11-14 RX ADMIN — ATORVASTATIN CALCIUM 40 MILLIGRAM(S): 80 TABLET, FILM COATED ORAL at 21:50

## 2019-11-14 RX ADMIN — OXYCODONE AND ACETAMINOPHEN 1 TABLET(S): 5; 325 TABLET ORAL at 13:10

## 2019-11-14 RX ADMIN — Medication 50 MILLIGRAM(S): at 06:17

## 2019-11-14 RX ADMIN — OXYCODONE AND ACETAMINOPHEN 1 TABLET(S): 5; 325 TABLET ORAL at 18:15

## 2019-11-14 RX ADMIN — Medication 0.5 MILLIGRAM(S): at 06:17

## 2019-11-14 RX ADMIN — Medication 1: at 17:15

## 2019-11-14 RX ADMIN — OXYCODONE AND ACETAMINOPHEN 1 TABLET(S): 5; 325 TABLET ORAL at 06:57

## 2019-11-14 RX ADMIN — Medication 0.5 MILLIGRAM(S): at 21:49

## 2019-11-14 RX ADMIN — Medication 1: at 11:49

## 2019-11-14 RX ADMIN — SEVELAMER CARBONATE 800 MILLIGRAM(S): 2400 POWDER, FOR SUSPENSION ORAL at 07:56

## 2019-11-14 RX ADMIN — Medication 3: at 07:55

## 2019-11-14 RX ADMIN — Medication 3 MILLILITER(S): at 06:29

## 2019-11-14 RX ADMIN — OXYCODONE AND ACETAMINOPHEN 1 TABLET(S): 5; 325 TABLET ORAL at 12:35

## 2019-11-14 RX ADMIN — Medication 2: at 21:50

## 2019-11-14 RX ADMIN — Medication 3 MILLILITER(S): at 12:04

## 2019-11-14 RX ADMIN — Medication 4 UNIT(S): at 17:16

## 2019-11-14 RX ADMIN — PANTOPRAZOLE SODIUM 40 MILLIGRAM(S): 20 TABLET, DELAYED RELEASE ORAL at 06:17

## 2019-11-14 RX ADMIN — Medication 25 MILLIGRAM(S): at 06:16

## 2019-11-14 RX ADMIN — Medication 4 UNIT(S): at 07:55

## 2019-11-14 RX ADMIN — OXYCODONE AND ACETAMINOPHEN 1 TABLET(S): 5; 325 TABLET ORAL at 17:49

## 2019-11-14 RX ADMIN — Medication 5 UNIT(S): at 23:31

## 2019-11-14 RX ADMIN — Medication 3 MILLILITER(S): at 22:33

## 2019-11-14 RX ADMIN — SEVELAMER CARBONATE 800 MILLIGRAM(S): 2400 POWDER, FOR SUSPENSION ORAL at 12:04

## 2019-11-14 RX ADMIN — ISOSORBIDE DINITRATE 10 MILLIGRAM(S): 5 TABLET ORAL at 06:16

## 2019-11-14 RX ADMIN — INSULIN GLARGINE 12 UNIT(S): 100 INJECTION, SOLUTION SUBCUTANEOUS at 21:51

## 2019-11-14 RX ADMIN — CLOPIDOGREL BISULFATE 75 MILLIGRAM(S): 75 TABLET, FILM COATED ORAL at 12:04

## 2019-11-14 RX ADMIN — Medication 81 MILLIGRAM(S): at 12:04

## 2019-11-14 NOTE — CONSULT NOTE ADULT - SUBJECTIVE AND OBJECTIVE BOX
HPI:  64 y.o. female with h/o uncontrolled Type 1 DM (diagnosed at age 20) c/b neuropathy, retinopathy, ESRD on HD with CAD s/p PCI, TIA, CHF and lung lesion admitted for SOB. Patient had cardiac cath in Feb 2019 with no intervention. Patient follows with endocrinologist Dr Ley. Patient takes Lantus 12 U at night and Humalog 3-5U before meals. States glucose values at home have recently been well controlled with values in the 100's without hypoglycemia. Only recently 1-2 days prior to admission has she noticed values > 200. Has had [facility] admissions for  DKA in the past. Is UTD with opthalmology and goes every 3 months. On HD 4 days per week. No podiatry. States she tries to moderate her intake of carbs. Patient used to be on an insulin pump in the past but was not able to manage it appropriately.     Also hx CAD (Cath Feb '19 showing 99% RCA, 100% RPLS, 100% Cx) s/p multiple stents/brachytherapy, ESRD (on HD M/T/T/Sa), DM1 c/b neuropathy and retinopathy, CHF (EF 30% per last [facility]), mod MR, severe AS, RHF, TIA, severe PVD s/p b/l fempop bypass c/b left thrombosed graft, left subclavian vein stenosis s/p stent, COPD not on O2, gout, hilar lymphadenopathy of unknown etiology, frequent hospitalizations (usually 1-3 times a month) pw SOB, coughing for 2 weeks.    Pt reports 2-3 days of worsening SOB, with chronic productive cough. Pt states she took her nebuilizer and went to bed, but couldn't sleep 2/2 SOB. Per EMS, but had FSBS in the 500s. Pt reports good compliance with medications and doesn't miss insulin doses. Last HD session was yesterday. At baseline, pt able to ambulate short distances with her cane. Pt does not take diuretics. CT scans in the past have shown persistent lower lobe consolidation and upper lobe GGO of unclear etiology. Pt denies fever, chest pain, palpitations.       PAST MEDICAL & SURGICAL HISTORY:  COPD (chronic obstructive pulmonary disease)  Localized enlarged lymph nodes  CHF (congestive heart failure): EF 40-45%  Subclavian vein stenosis, left: s/p stent  DKA, type 1: 1/2015  ACS (acute coronary syndrome): 1/2015 - cath revealed 100% ostial stenosis not amenable to PCI - medical management  TIA (transient ischemic attack): x 2 - 8-9 years ago prior to ASD/VSD repair  CAD (coronary artery disease): s/p stents  Gout: past  CVA (cerebral infarction): with no residual, 8 yrs ago, prior to heart surgery - ST memory loss  Peripheral vascular disease: occluded left fem-pop bypass 5/2015  Diabetes mellitus type 1: Insulin Dependent -  ESRD (end stage renal disease): dialysis  M, tue, thursday, saturday  Hyperlipidemia  Status post device closure of ASD: &quot;clamshell&quot;  History of cardiac catheterization: 1/2015 - no intervention  S/P femoral-popliteal bypass surgery: L and R in 2013 with graft; 5/2015 CFA angioplasty left and ileofemoral endarterectomywith vein patch angioplasty of left fem-pop bypass graft  Multiple vascular surgery both leg, left fempop bypass revision 11/2015  AV (arteriovenous fistula): Left AV graft; revision with stent placement 2-3 years ago  S/P cholecystectomy      FAMILY HISTORY:  Family history of smoking  Family history of hypertension  Family history of cancer (Sibling)      Social History: Lives with . + former tobacco use    Outpatient Medications:  · 	NovoLOG FlexPen 100 units/mL injectable solution: Last Dose Taken:  , 4 unit(s) injectable 3 times a day  · 	atorvastatin 40 mg oral tablet: Last Dose Taken:  , 1 tab(s) orally once a day (at bedtime)  · 	metoprolol succinate 50 mg oral tablet, extended release: Last Dose Taken:  , 1 tab(s) orally once a day  · 	sevelamer carbonate 800 mg oral tablet: Last Dose Taken:  , 1 tab(s) orally 3 times a day (with meals)  · 	budesonide 0.5 mg/2 mL inhalation suspension: Last Dose Taken:  , 2 milliliter(s) by nebulizer 2 times a day   · 	ipratropium-albuterol 0.5 mg-2.5 mg/3 mLinhalation solution: Last Dose Taken:  , 3 milliliter(s) inhaled every 6 hours as needed for wheezing/shortness of breath  · 	docusate sodium 100 mg oral capsule: Last Dose Taken:  , 1 cap(s) orally 3 times a day  · 	senna oral tablet: Last Dose Taken:  , 2 tab(s) orally once a day (at bedtime), As needed, Constipation  · 	isosorbide dinitrate 10 mg oral tablet: Last Dose Taken:  , 1 tab(s) orally 3 times a day  · 	Multiple Vitamins oral tablet: Last Dose Taken:  , 1 tab(s) orally once a day  · 	hydrALAZINE 25 mg oral tablet: Last Dose Taken:  , 1 tab(s) orally 3 times a day  · 	clopidogrel 75 mg oral tablet: Last Dose Taken:  , 1 tab(s) orally once a day  · 	aspirin 81 mg oral delayed release tablet: Last Dose Taken:  , 1 tab(s) orally once a day  · 	Percocet 5/325 oral tablet: Last Dose Taken:  , 1 tab(s) orally 2 times a day  · 	pantoprazole 40 mg oral delayed release tablet: Last Dose Taken:  , 1 tab(s) orally once a day  · 	Lantus 100 units/mL subcutaneous solution: Last Dose Taken:  , 12 unit(s) subcutaneous once a day (at bedtime)    MEDICATIONS  (STANDING):  albuterol/ipratropium for Nebulization 3 milliLiter(s) Nebulizer every 6 hours  aspirin enteric coated 81 milliGRAM(s) Oral daily  atorvastatin 40 milliGRAM(s) Oral at bedtime  buDESOnide    Inhalation Suspension 0.5 milliGRAM(s) Inhalation two times a day  clopidogrel Tablet 75 milliGRAM(s) Oral daily  dextrose 5%. 1000 milliLiter(s) (50 mL/Hr) IV Continuous <Continuous>  dextrose 50% Injectable 12.5 Gram(s) IV Push once  dextrose 50% Injectable 25 Gram(s) IV Push once  dextrose 50% Injectable 25 Gram(s) IV Push once  heparin  Injectable 5000 Unit(s) SubCutaneous three times a day  insulin glargine Injectable (LANTUS) 12 Unit(s) SubCutaneous at bedtime  insulin lispro (HumaLOG) corrective regimen sliding scale   SubCutaneous three times a day before meals  insulin lispro (HumaLOG) corrective regimen sliding scale   SubCutaneous at bedtime  insulin lispro Injectable (HumaLOG) 4 Unit(s) SubCutaneous three times a day before meals  isosorbide   dinitrate Tablet (ISORDIL) 10 milliGRAM(s) Oral three times a day  metoprolol succinate ER 50 milliGRAM(s) Oral daily  pantoprazole    Tablet 40 milliGRAM(s) Oral before breakfast  sevelamer carbonate 800 milliGRAM(s) Oral three times a day with meals    MEDICATIONS  (PRN):  dextrose 40% Gel 15 Gram(s) Oral once PRN Blood Glucose LESS THAN 70 milliGRAM(s)/deciliter  glucagon  Injectable 1 milliGRAM(s) IntraMuscular once PRN Glucose LESS THAN 70 milligrams/deciliter  hydrALAZINE 25 milliGRAM(s) Oral three times a day PRN Systolic blood pressure > 170  oxycodone    5 mG/acetaminophen 325 mG 1 Tablet(s) Oral every 4 hours PRN Moderate Pain (4 - 6)      Allergies    No Known Allergies    Intolerances      Review of Systems:  Constitutional: No fever, No change in weight  Eyes: +retinopathy  Neuro: No headache, +neuropathy  HEENT: No throat pain  Cardiovascular: No chest pain  Respiratory: + SOB  GI: No nausea or vomiting  : No polyuria  Skin: no rash  Psych: no depression  Endocrine: No polydipsia, No heat or cold intolerance, rest as noted in HPI  Hem/lymph: +left > right LE swelling    All other review of systems negative      PHYSICAL EXAM:  VITALS: T(C): 36.7 (11-14-19 @ 15:35)  T(F): 98 (11-14-19 @ 15:35), Max: 98.3 (11-13-19 @ 23:10)  HR: 72 (11-14-19 @ 15:35) (72 - 100)  BP: 105/53 (11-14-19 @ 15:35) (105/53 - 157/76)  RR:  (16 - 19)  SpO2:  (98% - 100%)  Wt(kg): --  GENERAL: NAD at this time  EYES: No proptosis, EOMI  HEENT:  Atraumatic, Normocephalic,   THYROID: Normal size, no palpable nodules  RESPIRATORY: full excursion, non-labored  CARDIOVASCULAR: Regular rhythm; No murmurs; +left > right LE peripheral edema  GI: Soft, nontender, non distended, normal bowel sounds  SKIN: Dry, intact, No rashes or lesions  MUSCULOSKELETAL: normal strength  NEURO: follows commands  PSYCH: Alert and oriented x 3, normal affect, normal mood  CUSHING'S SIGNS: no striae      POCT Blood Glucose.: 152 mg/dL (11-14-19 @ 17:00)  POCT Blood Glucose.: 182 mg/dL (11-14-19 @ 11:47)  POCT Blood Glucose.: 278 mg/dL (11-14-19 @ 07:37)  POCT Blood Glucose.: 119 mg/dL (11-13-19 @ 23:24)  POCT Blood Glucose.: 133 mg/dL (11-13-19 @ 21:51)  POCT Blood Glucose.: 270 mg/dL (11-13-19 @ 20:21)  POCT Blood Glucose.: 361 mg/dL (11-13-19 @ 17:56)                              8.5    4.24  )-----------( 200      ( 14 Nov 2019 09:19 )             27.2       11-14    137  |  95<L>  |  24<H>  ----------------------------<  258<H>  6.1<H>   |  26  |  4.52<H>    EGFR if : 11<L>  EGFR if non : 10<L>    Ca    9.0      11-14  Mg     2.2     11-14  Phos  5.2     11-14    TPro  6.4  /  Alb  3.8  /  TBili  0.2  /  DBili  x   /  AST  19  /  ALT  13  /  AlkPhos  85  11-14    Thyroid Function Tests:  11-14 @ 09:15 TSH 1.78 FreeT4 -- T3 -- Anti TPO -- Anti Thyroglobulin Ab -- TSI --      Hemoglobin A1C, Whole Blood: 8.7 % <H> [4.0 - 5.6] (11-13-19 @ 23:44)  Hemoglobin A1C, Whole Blood: 8.1 % <H> [4.0 - 5.6] (09-16-19 @ 08:04)      11-14 Chol 108 LDL 36 HDL 57 Trig 76  Radiology:

## 2019-11-14 NOTE — PROGRESS NOTE ADULT - SUBJECTIVE AND OBJECTIVE BOX
[facility]   [phone]  DIALYSIS NOTE  Chief Complaint: ESRD/Ongoing hemodialysis requirement. seen on hd    24 hour events/subjective:      seen earlier. states breathing is slightly better.       ALLERGIES & MEDICATIONS  --------------------------------------------------------------------------------  Allergies    No Known Allergies    Intolerances      Standing Inpatient Medications  albuterol/ipratropium for Nebulization 3 milliLiter(s) Nebulizer every 6 hours  aspirin enteric coated 81 milliGRAM(s) Oral daily  atorvastatin 40 milliGRAM(s) Oral at bedtime  buDESOnide    Inhalation Suspension 0.5 milliGRAM(s) Inhalation two times a day  clopidogrel Tablet 75 milliGRAM(s) Oral daily  dextrose 5%. 1000 milliLiter(s) IV Continuous <Continuous>  dextrose 50% Injectable 12.5 Gram(s) IV Push once  dextrose 50% Injectable 25 Gram(s) IV Push once  dextrose 50% Injectable 25 Gram(s) IV Push once  heparin  Injectable 5000 Unit(s) SubCutaneous three times a day  insulin glargine Injectable (LANTUS) 12 Unit(s) SubCutaneous at bedtime  insulin lispro (HumaLOG) corrective regimen sliding scale   SubCutaneous three times a day before meals  insulin lispro (HumaLOG) corrective regimen sliding scale   SubCutaneous at bedtime  insulin lispro Injectable (HumaLOG) 4 Unit(s) SubCutaneous three times a day before meals  isosorbide   dinitrate Tablet (ISORDIL) 10 milliGRAM(s) Oral three times a day  metoprolol succinate ER 50 milliGRAM(s) Oral daily  pantoprazole    Tablet 40 milliGRAM(s) Oral before breakfast  sevelamer carbonate 800 milliGRAM(s) Oral three times a day with meals    PRN Inpatient Medications  dextrose 40% Gel 15 Gram(s) Oral once PRN  glucagon  Injectable 1 milliGRAM(s) IntraMuscular once PRN  hydrALAZINE 25 milliGRAM(s) Oral three times a day PRN  oxycodone    5 mG/acetaminophen 325 mG 1 Tablet(s) Oral every 4 hours PRN      REVIEW OF SYSTEMS  --------------------------------------------------------------------------------  no itching or rash  no fever or chill  no cp or palp   denies worsening sob  +  cough   no N/V/D/ no abd pain   ext no edema        VITALS/PHYSICAL EXAM  --------------------------------------------------------------------------------  T(C): 36.7 (11-14-19 @ 18:40), Max: 36.8 (11-13-19 @ 23:10)  HR: 82 (11-14-19 @ 19:17) (72 - 100)  BP: 96/53 (11-14-19 @ 19:17) (96/53 - 122/54)  RR: 18 (11-14-19 @ 19:17) (16 - 18)  SpO2: 100% (11-14-19 @ 19:17) (98% - 100%)  Wt(kg): --  Height (cm): 162.6 (11-14-19 @ 03:02)  Weight (kg): 54.8 (11-14-19 @ 03:02)  BMI (kg/m2): 20.7 (11-14-19 @ 03:02)  BSA (m2): 1.58 (11-14-19 @ 03:02)      11-13-19 @ 07:01  -  11-14-19 @ 07:00  --------------------------------------------------------  IN: 120 mL / OUT: 0 mL / NET: 120 mL    11-14-19 @ 07:01  -  11-14-19 @ 21:39  --------------------------------------------------------  IN: 920 mL / OUT: 2700 mL / NET: -1780 mL      Physical Exam:  		Gen: more comfortable appearing   	no jvd  	Pulm: decrease bs  no rales or ronchi  + insp wheezing improving   	CV: RRR, S1S2; no rub  	Back: No CVA tendernes  	Abd: +BS, soft, nontender/nondistended  	: No suprapubic tenderness  	UE: Warm, no cyanosis  no clubbing,  no edema; no asterixis  	LE: Warm, no cyanosis  no clubbing, LLE 2-  edema  	Neuro: alert and oriented. speech coherent   	Skin: Warm, no decrease skin turgor   	Vascular access: + avf LUE + bruit and thrill 	      LABS/STUDIES  --------------------------------------------------------------------------------              8.5    4.24  >-----------<  200      [11-14-19 @ 09:19]              27.2     137  |  95  |  24  ----------------------------<  258      [11-14-19 @ 07:08]  6.1   |  26  |  4.52        Ca     9.0     [11-14-19 @ 07:08]      Mg     2.2     [11-14-19 @ 07:08]      Phos  5.2     [11-14-19 @ 07:08]    TPro  6.4  /  Alb  3.8  /  TBili  0.2  /  DBili  x   /  AST  19  /  ALT  13  /  AlkPhos  85  [11-14-19 @ 07:08]    PT/INR: PT 12.0 , INR 1.05       [11-14-19 @ 09:33]  PTT: 30.0       [11-14-19 @ 09:33]          [11-14-19 @ 07:08]            imp/suggest: ESRD      Hemodialysis Prescription:  	Access:  	Dialyzer: revaclear   	Blood Flow (mL/Min): 400  	Dialysate Flow (mL/Min): 600  	Target UF (Liters):  	Treatment Time:  	Potassium:   	Calcium: 2.5  	  YOLANDA    Vitamin D     continue with hd   see hd flow sheet

## 2019-11-14 NOTE — PROGRESS NOTE ADULT - SUBJECTIVE AND OBJECTIVE BOX
Patient is a 64y old  Female who presents with a chief complaint of SOB (14 Nov 2019 17:15)      SUBJECTIVE / OVERNIGHT EVENTS: feels better.  Review of Systems  chest pain no  palpitations no  sob no  nausea no  headache no    MEDICATIONS  (STANDING):  albuterol/ipratropium for Nebulization 3 milliLiter(s) Nebulizer every 6 hours  aspirin enteric coated 81 milliGRAM(s) Oral daily  atorvastatin 40 milliGRAM(s) Oral at bedtime  buDESOnide    Inhalation Suspension 0.5 milliGRAM(s) Inhalation two times a day  clopidogrel Tablet 75 milliGRAM(s) Oral daily  dextrose 5%. 1000 milliLiter(s) (50 mL/Hr) IV Continuous <Continuous>  dextrose 50% Injectable 12.5 Gram(s) IV Push once  dextrose 50% Injectable 25 Gram(s) IV Push once  dextrose 50% Injectable 25 Gram(s) IV Push once  heparin  Injectable 5000 Unit(s) SubCutaneous three times a day  insulin glargine Injectable (LANTUS) 12 Unit(s) SubCutaneous at bedtime  insulin lispro (HumaLOG) corrective regimen sliding scale   SubCutaneous three times a day before meals  insulin lispro (HumaLOG) corrective regimen sliding scale   SubCutaneous at bedtime  insulin lispro Injectable (HumaLOG) 4 Unit(s) SubCutaneous three times a day before meals  isosorbide   dinitrate Tablet (ISORDIL) 10 milliGRAM(s) Oral three times a day  metoprolol succinate ER 50 milliGRAM(s) Oral daily  pantoprazole    Tablet 40 milliGRAM(s) Oral before breakfast  sevelamer carbonate 800 milliGRAM(s) Oral three times a day with meals    MEDICATIONS  (PRN):  dextrose 40% Gel 15 Gram(s) Oral once PRN Blood Glucose LESS THAN 70 milliGRAM(s)/deciliter  glucagon  Injectable 1 milliGRAM(s) IntraMuscular once PRN Glucose LESS THAN 70 milligrams/deciliter  hydrALAZINE 25 milliGRAM(s) Oral three times a day PRN Systolic blood pressure > 170  oxycodone    5 mG/acetaminophen 325 mG 1 Tablet(s) Oral every 4 hours PRN Moderate Pain (4 - 6)      Vital Signs Last 24 Hrs  T(C): 36.7 (14 Nov 2019 18:40), Max: 36.8 (13 Nov 2019 23:10)  T(F): 98.1 (14 Nov 2019 18:40), Max: 98.3 (13 Nov 2019 23:10)  HR: 82 (14 Nov 2019 19:17) (72 - 100)  BP: 96/53 (14 Nov 2019 19:17) (96/53 - 122/54)  BP(mean): --  RR: 18 (14 Nov 2019 19:17) (16 - 18)  SpO2: 100% (14 Nov 2019 19:17) (98% - 100%)    PHYSICAL EXAM:  GENERAL: NAD, well-developed  HEAD:  Atraumatic, Normocephalic  EYES: EOMI, PERRLA, conjunctiva and sclera clear  NECK: Supple, No JVD  CHEST/LUNG: Clear to auscultation bilaterally; No wheeze  HEART: Regular rate and rhythm; No murmurs, rubs, or gallops  ABDOMEN: Soft, Nontender, Nondistended; Bowel sounds present  EXTREMITIES:  2+ bipedal edema L>R  PSYCH: Anxious  NEUROLOGY: non-focal  SKIN: No rashes or lesions    LABS:                        8.5    4.24  )-----------( 200      ( 14 Nov 2019 09:19 )             27.2     11-14    137  |  95<L>  |  24<H>  ----------------------------<  258<H>  6.1<H>   |  26  |  4.52<H>    Ca    9.0      14 Nov 2019 07:08  Phos  5.2     11-14  Mg     2.2     11-14    TPro  6.4  /  Alb  3.8  /  TBili  0.2  /  DBili  x   /  AST  19  /  ALT  13  /  AlkPhos  85  11-14    PT/INR - ( 14 Nov 2019 09:33 )   PT: 12.0 sec;   INR: 1.05 ratio         PTT - ( 14 Nov 2019 09:33 )  PTT:30.0 sec  CARDIAC MARKERS ( 14 Nov 2019 07:08 )  x     / x     / 456 U/L / x     / 8.2 ng/mL  CARDIAC MARKERS ( 13 Nov 2019 23:01 )  x     / x     / 647 U/L / x     / 11.3 ng/mL            RADIOLOGY & ADDITIONAL TESTS:    Imaging Personally Reviewed:  < from: CT Chest No Cont (11.13.19 @ 22:30) >    IMPRESSION:     Interval near complete resolution of previously noted multiple bilateral   mid to lower lung tree-in-bud and centrilobular groundglass opacities.  A   few patchy nodular opacities within right middle lobe along major fissure   are new from the prior study and may be infectious/inflammatory.   Three-month follow-up CT recommended.  Unchanged previously referenced few bilateral groundglass nodules since   9/21/2019, but appear to have grossly increased in size since 2015.   Indolent malignancy should be considered.      Consultant(s) Notes Reviewed:      Care Discussed with Consultants/Other Providers:

## 2019-11-14 NOTE — CONSULT NOTE ADULT - SUBJECTIVE AND OBJECTIVE BOX
CHIEF COMPLAINT: sob    HISTORY OF PRESENT ILLNESS:  64F c hx CAD (Cath Feb '19 showing 99% RCA, 100% RPLS, 100% Cx) s/p multiple stents/brachytherapy, ESRD (on HD M/T/T/Sa), DM1 c/b neuropathy and retinopathy, CHF (EF 30% per last [facility]), mod MR, severe AS, RHF, TIA, severe PVD s/p b/l fempop bypass c/b left thrombosed graft, left subclavian vein stenosis s/p stent, COPD not on O2, gout, hilar lymphadenopathy of unknown etiology, frequent hospitalizations (usually 1-3 times a month) pw SOB, coughing for 2 weeks.    Pt reports 2-3 days of worsening SOB, with chronic productive cough. Pt states she took her nebuilizer and went to bed, but couldn't sleep 2/2 SOB. Per EMS, but had FSBS in the 500s. Pt reports good compliance with medications and doesn't miss insulin doses. Last HD session was yesterday. At baseline, pt able to ambulate short distances with her cane. Pt does not take diuretics. CT scans in the past have shown persistent lower lobe consolidation and upper lobe GGO of unclear etiology. Pt denies fever, chest pain, palpitations. Other ROS as below.      Allergies  No Known Allergies        MEDICATIONS:  aspirin enteric coated 81 milliGRAM(s) Oral daily  clopidogrel Tablet 75 milliGRAM(s) Oral daily  heparin  Injectable 5000 Unit(s) SubCutaneous three times a day  hydrALAZINE 25 milliGRAM(s) Oral three times a day PRN  isosorbide   dinitrate Tablet (ISORDIL) 10 milliGRAM(s) Oral three times a day  metoprolol succinate ER 50 milliGRAM(s) Oral daily  albuterol/ipratropium for Nebulization 3 milliLiter(s) Nebulizer every 6 hours  buDESOnide    Inhalation Suspension 0.5 milliGRAM(s) Inhalation two times a day  oxycodone    5 mG/acetaminophen 325 mG 1 Tablet(s) Oral every 4 hours PRN  pantoprazole    Tablet 40 milliGRAM(s) Oral before breakfast  atorvastatin 40 milliGRAM(s) Oral at bedtime  dextrose 40% Gel 15 Gram(s) Oral once PRN  dextrose 50% Injectable 12.5 Gram(s) IV Push once  dextrose 50% Injectable 25 Gram(s) IV Push once  dextrose 50% Injectable 25 Gram(s) IV Push once  glucagon  Injectable 1 milliGRAM(s) IntraMuscular once PRN  insulin glargine Injectable (LANTUS) 12 Unit(s) SubCutaneous at bedtime  insulin lispro (HumaLOG) corrective regimen sliding scale   SubCutaneous three times a day before meals  insulin lispro (HumaLOG) corrective regimen sliding scale   SubCutaneous at bedtime  insulin lispro Injectable (HumaLOG) 4 Unit(s) SubCutaneous three times a day before meals    dextrose 5%. 1000 milliLiter(s) IV Continuous <Continuous>      PAST MEDICAL & SURGICAL HISTORY:  COPD (chronic obstructive pulmonary disease)  Localized enlarged lymph nodes  CHF (congestive heart failure): EF 40-45%  Subclavian vein stenosis, left: s/p stent  DKA, type 1: 1/2015  ACS (acute coronary syndrome): 1/2015 - cath revealed 100% ostial stenosis not amenable to PCI - medical management  TIA (transient ischemic attack): x 2 - 8-9 years ago prior to ASD/VSD repair  CAD (coronary artery disease): s/p stents  Gout: past  CVA (cerebral infarction): with no residual, 8 yrs ago, prior to heart surgery - ST memory loss  Peripheral vascular disease: occluded left fem-pop bypass 5/2015  Diabetes mellitus type 1: Insulin Dependent -  ESRD (end stage renal disease): dialysis  M, tue, thursday, saturday  Hyperlipidemia  Status post device closure of ASD: &quot;clamshell&quot;  History of cardiac catheterization: 1/2015 - no intervention  S/P femoral-popliteal bypass surgery: L and R in 2013 with graft; 5/2015 CFA angioplasty left and ileofemoral endarterectomywith vein patch angioplasty of left fem-pop bypass graft  Multiple vascular surgery both leg, left fempop bypass revision 11/2015  AV (arteriovenous fistula): Left AV graft; revision with stent placement 2-3 years ago  S/P cholecystectomy      FAMILY HISTORY:  Family history of smoking  Family history of hypertension  Family history of cancer (Sibling)      SOCIAL HISTORY:    former smoker. independent in adl      REVIEW OF SYSTEMS:  See HPI, otherwise complete 10 point review of systems negative    [ ] All others negative	  [ ] Unable to obtain    PHYSICAL EXAM:  T(C): 36.7 (11-14-19 @ 06:14), Max: 36.8 (11-13-19 @ 23:10)  HR: 100 (11-14-19 @ 06:14) (72 - 100)  BP: 116/64 (11-14-19 @ 06:14) (115/58 - 157/76)  RR: 16 (11-14-19 @ 06:14) (16 - 19)  SpO2: 98% (11-14-19 @ 06:14) (98% - 100%)  Wt(kg): --  I&O's Summary    13 Nov 2019 07:01  -  14 Nov 2019 07:00  --------------------------------------------------------  IN: 120 mL / OUT: 0 mL / NET: 120 mL        Appearance: No Acute Distress	  HEENT:  Normal oral mucosa, PERRL, EOMI	  Cardiovascular: Normal S1 S2, + JVD, 2/6 heraclio  Respiratory: Lungs clear to auscultation bilaterally  Gastrointestinal:  Soft, Non-tender, + BS	  Skin: No rashes, No ecchymoses, No cyanosis	  Neurologic: Non-focal  Extremities: No clubbing, cyanosis. trace le edema  Vascular: dec pulses bilaterally  Psychiatry: A & O x 3, Mood & affect appropriate    Laboratory Data:	 	    CBC Full  -  ( 13 Nov 2019 18:44 )  WBC Count : 5.03 K/uL  Hemoglobin : 10.2 g/dL  Hematocrit : 31.4 %  Platelet Count - Automated : 217 K/uL  Mean Cell Volume : 101.6 fl  Mean Cell Hemoglobin : 33.0 pg  Mean Cell Hemoglobin Concentration : 32.5 gm/dL  Auto Neutrophil # : 3.36 K/uL  Auto Lymphocyte # : 0.95 K/uL  Auto Monocyte # : 0.53 K/uL  Auto Eosinophil # : 0.14 K/uL  Auto Basophil # : 0.03 K/uL  Auto Neutrophil % : 66.8 %  Auto Lymphocyte % : 18.9 %  Auto Monocyte % : 10.5 %  Auto Eosinophil % : 2.8 %  Auto Basophil % : 0.6 %    11-13    138  |  93<L>  |  44<H>  ----------------------------<  116<H>  6.0<H>   |  28  |  6.42<H>  11-13    133<L>  |  89<L>  |  40<H>  ----------------------------<  335<H>  5.8<H>   |  27  |  6.20<H>    Ca    9.2      13 Nov 2019 23:01  Ca    10.3      13 Nov 2019 18:44  Phos  6.1     11-13  Mg     2.6     11-13    TPro  8.4<H>  /  Alb  5.0  /  TBili  0.2  /  DBili  x   /  AST  21  /  ALT  15  /  AlkPhos  111  11-13      proBNP: Serum Pro-Brain Natriuretic Peptide: 80288 pg/mL (11-13 @ 18:44)    Lipid Profile:   HgA1c: Hemoglobin A1C, Whole Blood: 8.7 % (11-13 @ 23:44)        Interpretation of Telemetry:  nsr, st  ECG:  	nsr non specific st changes      Assessment:  -SOB  -pAT  -elevated but stable cardiac enzymes (hs trop) with recent admission with relatively preserved ck/ck mb fraction and no obvious ischemic changes on ekg  -hx of NSVT  -pAT  -volume overload  -ischemic cardiomyopathy, cad s/p multiple stents  -esrd on hd  -PAD s/p prior intervention   -elecytrolyte abnormalities        Recs:  -s/p urgent hd last night  -renal recs appreciated. c/w volume removal for hd. sob likely multifactorial. f/u ct chest  -known extensive CAD and ICM. s/p [facility] 2/20/2019 --> severe and diffuse instent restenosis along RCA --> unable to stent due to multiple layers of stenting and prior brachytherapy. denies any true ischemic sx at present  -c/w beta blockers for nsvt/pat/cad (as above). currently on toprol 50mg, isordil 10mg tid, hydral 25mg tid. uptitrate GDMT as tolerates. wouldnt inc bb at this time 2/2 bronchospasm  -hx of prolonged qtc. avoid qtc prolonging meds  -s/p TTE 2019: mod-severe MR, pseudo AS (low flow-low gradient), mod-severe LV dysfunction --> recent CTS consult with dr hebert appreciated. plan is for medical management given surgical risk and patient preference  -c/w asa, plavix, statin for ischemic cardiomyopathy/CAD/PAD  -dvt ppx        Greater than 50 minutes spent on total encounter; more than 50% of the visit was spent counseling and/or coordinating care by the attending physician.   	  Julián Biswas MD   Cardiovascular Diseases  [phone]

## 2019-11-14 NOTE — PROGRESS NOTE ADULT - ASSESSMENT
· Assessment		  64F c hx CAD (Cath Feb '19 showing 99% RCA, 100% RPLS, 100% Cx) s/p multiple stents/brachytherapy, ESRD (on HD M/T/T/Sa), DM1 c/b neuropathy and retinopathy, CHF (EF 30% per last [facility]), mod MR, severe AS, RHF, TIA, severe PVD s/p b/l fempop bypass c/b left thrombosed graft, left subclavian vein stenosis s/p stent, COPD not on O2, gout, hilar lymphadenopathy of unknown etiology, frequent hospitalizations (usually 1-3 times a month) pw SOB, cough, hyperkalemia    Hyperkalemia.   - resolved  - HD.     SOB (shortness of breath).  - either COPD exacerbation vs CHF  - cont nebs and budesonide  - hold off diuresis and abx for now.     Lung nodules  - follow    Hyperglycemia due to type 1 diabetes mellitus  - improving with humalog  - continue lantus   - endocrine evaluation    Chronic combined systolic and diastolic congestive heart failure.   - cardiology follow  - cont BB.     Coronary artery disease involving native coronary artery of native heart, angina presence unspecified.  - pt has chronic hsTROP elevation, see prior trops, 400s in Sept, 200s in July.  - pt has known occlusive CAD on medical management  - cont home asa/plavix, lipitor, BB, bp meds  - tele  - cardiology follow     ESRD (end stage renal disease) on dialysis.  - renal consult noted  - cont renagel  - HD  Pierce Viera MD pager [phone]

## 2019-11-14 NOTE — PROGRESS NOTE ADULT - ASSESSMENT
64F c hx CAD (Cath Feb '19 showing 99% RCA, 100% RPLS, 100% Cx) s/p multiple stents/brachytherapy, ESRD (on HD M/T/T/Sa), DM1 c/b neuropathy and retinopathy, CHF (EF 30% per last [facility]), mod MR, severe AS, RHF, TIA, severe PVD s/p b/l fempop bypass c/b left thrombosed graft, left subclavian vein stenosis s/p stent, COPD not on O2, gout, hilar lymphadenopathy of unknown etiology, frequent hospitalizations (usually 1-3 times a month) pw SOB, coughing for 2 weeks. last hd yesterday admitted with sob and hyperkalemia      1- hyperkalemia  2- chf  3- HTN hx  4- ESRD  5- SHPT  6- anemia   hd 3 hr 2 k bath 400 cc bfr 600 dfr 2 liter  F 160 dialyzer

## 2019-11-14 NOTE — ED ADULT NURSE REASSESSMENT NOTE - NS ED NURSE REASSESS COMMENT FT1
pt given insulin, fs performed. food given, will recheck fs @2130.
pt at dialysis
pt returned from dialysis. handoff received from KRISTINE sunshine
report received from caridad

## 2019-11-14 NOTE — PROVIDER CONTACT NOTE (OTHER) - ASSESSMENT
pt A+OX3, denies cp, +LIPSCOMB, NSR on tele. left AVF benign. VSS: HR 78,  /60, RR 18, O2 sat 95 on room air

## 2019-11-14 NOTE — CONSULT NOTE ADULT - PROBLEM SELECTOR RECOMMENDATION 9
Diabetes Education and Nutrition Eval  Monitor on Lantus 12 units qhs and Humalog 4 units qac  Low correction scale qac + bedtime  Goal glucose 100-200 given brittle diabetes  Outpt. endo follow-up with Dr. Ley  Outpt. optho, podiatry, nephrology  Plan to d/c on basal bolus.

## 2019-11-14 NOTE — CONSULT NOTE ADULT - ASSESSMENT
64yo F h/o DM1 w frequent hospitalizations for DKA, ESRD on HD (LUE AVF, M/T/Th/Sa, last HD currently), CAD s/p  stents, HTN HLD, CVA, PVD presents from home w/ SOB with hyperglycemia (high risk patient with high level decision-making).

## 2019-11-15 ENCOUNTER — TRANSCRIPTION ENCOUNTER (OUTPATIENT)
Age: 64
End: 2019-11-15

## 2019-11-15 VITALS
OXYGEN SATURATION: 95 % | TEMPERATURE: 98 F | RESPIRATION RATE: 18 BRPM | SYSTOLIC BLOOD PRESSURE: 115 MMHG | DIASTOLIC BLOOD PRESSURE: 65 MMHG | HEART RATE: 79 BPM

## 2019-11-15 LAB
GLUCOSE BLDC GLUCOMTR-MCNC: 137 MG/DL — HIGH (ref 70–99)
GLUCOSE BLDC GLUCOMTR-MCNC: 210 MG/DL — HIGH (ref 70–99)
GLUCOSE BLDC GLUCOMTR-MCNC: 336 MG/DL — HIGH (ref 70–99)
GLUCOSE BLDC GLUCOMTR-MCNC: 93 MG/DL — SIGNIFICANT CHANGE UP (ref 70–99)
HCT VFR BLD CALC: 27.4 % — LOW (ref 34.5–45)
HGB BLD-MCNC: 8.5 G/DL — LOW (ref 11.5–15.5)
MCHC RBC-ENTMCNC: 31 GM/DL — LOW (ref 32–36)
MCHC RBC-ENTMCNC: 32.6 PG — SIGNIFICANT CHANGE UP (ref 27–34)
MCV RBC AUTO: 105 FL — HIGH (ref 80–100)
NRBC # BLD: 0 /100 WBCS — SIGNIFICANT CHANGE UP (ref 0–0)
PLATELET # BLD AUTO: 176 K/UL — SIGNIFICANT CHANGE UP (ref 150–400)
RBC # BLD: 2.61 M/UL — LOW (ref 3.8–5.2)
RBC # FLD: 13.2 % — SIGNIFICANT CHANGE UP (ref 10.3–14.5)
WBC # BLD: 4.55 K/UL — SIGNIFICANT CHANGE UP (ref 3.8–10.5)
WBC # FLD AUTO: 4.55 K/UL — SIGNIFICANT CHANGE UP (ref 3.8–10.5)

## 2019-11-15 PROCEDURE — 99232 SBSQ HOSP IP/OBS MODERATE 35: CPT

## 2019-11-15 PROCEDURE — 82803 BLOOD GASES ANY COMBINATION: CPT

## 2019-11-15 PROCEDURE — 85610 PROTHROMBIN TIME: CPT

## 2019-11-15 PROCEDURE — 82947 ASSAY GLUCOSE BLOOD QUANT: CPT

## 2019-11-15 PROCEDURE — 86705 HEP B CORE ANTIBODY IGM: CPT

## 2019-11-15 PROCEDURE — 82553 CREATINE MB FRACTION: CPT

## 2019-11-15 PROCEDURE — 84100 ASSAY OF PHOSPHORUS: CPT

## 2019-11-15 PROCEDURE — 86704 HEP B CORE ANTIBODY TOTAL: CPT

## 2019-11-15 PROCEDURE — 82550 ASSAY OF CK (CPK): CPT

## 2019-11-15 PROCEDURE — 83036 HEMOGLOBIN GLYCOSYLATED A1C: CPT

## 2019-11-15 PROCEDURE — 82010 KETONE BODYS QUAN: CPT

## 2019-11-15 PROCEDURE — 84295 ASSAY OF SERUM SODIUM: CPT

## 2019-11-15 PROCEDURE — 84132 ASSAY OF SERUM POTASSIUM: CPT

## 2019-11-15 PROCEDURE — 87486 CHLMYD PNEUM DNA AMP PROBE: CPT

## 2019-11-15 PROCEDURE — 83735 ASSAY OF MAGNESIUM: CPT

## 2019-11-15 PROCEDURE — 80048 BASIC METABOLIC PNL TOTAL CA: CPT

## 2019-11-15 PROCEDURE — 83880 ASSAY OF NATRIURETIC PEPTIDE: CPT

## 2019-11-15 PROCEDURE — 85027 COMPLETE CBC AUTOMATED: CPT

## 2019-11-15 PROCEDURE — 99285 EMERGENCY DEPT VISIT HI MDM: CPT | Mod: 25

## 2019-11-15 PROCEDURE — 82565 ASSAY OF CREATININE: CPT

## 2019-11-15 PROCEDURE — 82330 ASSAY OF CALCIUM: CPT

## 2019-11-15 PROCEDURE — 85730 THROMBOPLASTIN TIME PARTIAL: CPT

## 2019-11-15 PROCEDURE — 86803 HEPATITIS C AB TEST: CPT

## 2019-11-15 PROCEDURE — 83605 ASSAY OF LACTIC ACID: CPT

## 2019-11-15 PROCEDURE — 93306 TTE W/DOPPLER COMPLETE: CPT

## 2019-11-15 PROCEDURE — 71250 CT THORAX DX C-: CPT

## 2019-11-15 PROCEDURE — 84484 ASSAY OF TROPONIN QUANT: CPT

## 2019-11-15 PROCEDURE — 87581 M.PNEUMON DNA AMP PROBE: CPT

## 2019-11-15 PROCEDURE — 71045 X-RAY EXAM CHEST 1 VIEW: CPT

## 2019-11-15 PROCEDURE — 80053 COMPREHEN METABOLIC PANEL: CPT

## 2019-11-15 PROCEDURE — 94640 AIRWAY INHALATION TREATMENT: CPT

## 2019-11-15 PROCEDURE — 86706 HEP B SURFACE ANTIBODY: CPT

## 2019-11-15 PROCEDURE — 87340 HEPATITIS B SURFACE AG IA: CPT

## 2019-11-15 PROCEDURE — 99261: CPT

## 2019-11-15 PROCEDURE — 84443 ASSAY THYROID STIM HORMONE: CPT

## 2019-11-15 PROCEDURE — 87798 DETECT AGENT NOS DNA AMP: CPT

## 2019-11-15 PROCEDURE — 93005 ELECTROCARDIOGRAM TRACING: CPT

## 2019-11-15 PROCEDURE — 80061 LIPID PANEL: CPT

## 2019-11-15 PROCEDURE — 87633 RESP VIRUS 12-25 TARGETS: CPT

## 2019-11-15 PROCEDURE — 97161 PT EVAL LOW COMPLEX 20 MIN: CPT

## 2019-11-15 PROCEDURE — 85014 HEMATOCRIT: CPT

## 2019-11-15 PROCEDURE — 82435 ASSAY OF BLOOD CHLORIDE: CPT

## 2019-11-15 PROCEDURE — 82962 GLUCOSE BLOOD TEST: CPT

## 2019-11-15 RX ORDER — INSULIN LISPRO 100/ML
VIAL (ML) SUBCUTANEOUS
Refills: 0 | Status: DISCONTINUED | OUTPATIENT
Start: 2019-11-15 | End: 2019-11-15

## 2019-11-15 RX ORDER — INSULIN LISPRO 100/ML
2 VIAL (ML) SUBCUTANEOUS AT BEDTIME
Refills: 0 | Status: DISCONTINUED | OUTPATIENT
Start: 2019-11-15 | End: 2019-11-15

## 2019-11-15 RX ADMIN — OXYCODONE AND ACETAMINOPHEN 1 TABLET(S): 5; 325 TABLET ORAL at 16:57

## 2019-11-15 RX ADMIN — OXYCODONE AND ACETAMINOPHEN 1 TABLET(S): 5; 325 TABLET ORAL at 07:31

## 2019-11-15 RX ADMIN — CLOPIDOGREL BISULFATE 75 MILLIGRAM(S): 75 TABLET, FILM COATED ORAL at 06:51

## 2019-11-15 RX ADMIN — SEVELAMER CARBONATE 800 MILLIGRAM(S): 2400 POWDER, FOR SUSPENSION ORAL at 08:07

## 2019-11-15 RX ADMIN — SEVELAMER CARBONATE 800 MILLIGRAM(S): 2400 POWDER, FOR SUSPENSION ORAL at 12:03

## 2019-11-15 RX ADMIN — Medication 3 MILLILITER(S): at 06:51

## 2019-11-15 RX ADMIN — Medication 3 MILLILITER(S): at 17:01

## 2019-11-15 RX ADMIN — Medication 4 UNIT(S): at 16:59

## 2019-11-15 RX ADMIN — Medication 0.5 MILLIGRAM(S): at 06:40

## 2019-11-15 RX ADMIN — SEVELAMER CARBONATE 800 MILLIGRAM(S): 2400 POWDER, FOR SUSPENSION ORAL at 16:57

## 2019-11-15 RX ADMIN — ISOSORBIDE DINITRATE 10 MILLIGRAM(S): 5 TABLET ORAL at 16:57

## 2019-11-15 RX ADMIN — Medication 50 MILLIGRAM(S): at 06:51

## 2019-11-15 RX ADMIN — Medication 81 MILLIGRAM(S): at 06:51

## 2019-11-15 RX ADMIN — OXYCODONE AND ACETAMINOPHEN 1 TABLET(S): 5; 325 TABLET ORAL at 08:01

## 2019-11-15 RX ADMIN — Medication 2: at 16:58

## 2019-11-15 RX ADMIN — OXYCODONE AND ACETAMINOPHEN 1 TABLET(S): 5; 325 TABLET ORAL at 01:05

## 2019-11-15 RX ADMIN — PANTOPRAZOLE SODIUM 40 MILLIGRAM(S): 20 TABLET, DELAYED RELEASE ORAL at 06:51

## 2019-11-15 RX ADMIN — Medication 4 UNIT(S): at 08:07

## 2019-11-15 RX ADMIN — ISOSORBIDE DINITRATE 10 MILLIGRAM(S): 5 TABLET ORAL at 06:51

## 2019-11-15 RX ADMIN — Medication 4 UNIT(S): at 12:04

## 2019-11-15 RX ADMIN — OXYCODONE AND ACETAMINOPHEN 1 TABLET(S): 5; 325 TABLET ORAL at 17:27

## 2019-11-15 RX ADMIN — OXYCODONE AND ACETAMINOPHEN 1 TABLET(S): 5; 325 TABLET ORAL at 01:58

## 2019-11-15 RX ADMIN — Medication 0.5 MILLIGRAM(S): at 17:02

## 2019-11-15 RX ADMIN — Medication 3 MILLILITER(S): at 12:04

## 2019-11-15 NOTE — DISCHARGE NOTE PROVIDER - CARE PROVIDER_API CALL
Pina Ley)  Endocrinology[facility]ab[facility]abetes  [address] [place] [address]  Phone: [phone]  Fax: [phone]  Follow Up Time:     Daisy Burger (DO)  Nephrology  [address] [place] [address]  Phone: [phone]  Fax: [phone]  Follow Up Time:

## 2019-11-15 NOTE — DISCHARGE NOTE PROVIDER - HOSPITAL COURSE
HPI:    64F c hx CAD (Cath Feb '19 showing 99% RCA, 100% RPLS, 100% Cx) s/p multiple stents/brachytherapy, ESRD (on HD M/T/T/Sa), DM1 c/b neuropathy and retinopathy, CHF (EF 30% per last [facility]), mod MR, severe AS, RHF, TIA, severe PVD s/p b/l fempop bypass c/b left thrombosed graft, left subclavian vein stenosis s/p stent, COPD not on O2, gout, hilar lymphadenopathy of unknown etiology, frequent hospitalizations (usually 1-3 times a month) pw SOB, coughing for 2 weeks.        Pt reports 2-3 days of worsening SOB, with chronic productive cough. Pt states she took her nebuilizer and went to bed, but couldn't sleep 2/2 SOB. Per EMS, but had FSBS in the 500s. Pt reports good compliance with medications and doesn't miss insulin doses. Last HD session was yesterday. At baseline, pt able to ambulate short distances with her cane. Pt does not take diuretics. CT scans in the past have shown persistent lower lobe consolidation and upper lobe GGO of unclear etiology. Pt denies fever, chest pain, palpitations. Other ROS as below.        VS: Tm 98.1, P 77, /77, R 19, 99% RA    In the ED, received humalog 6        ISTOP Reference #: [id number]    Rx Written	Rx Dispensed	Drug	Quantity	Days Supply	Prescriber Name    10/15/2019	10/15/2019	oxycodone-acetaminophen 5-325 mg tab	60	30	Tee Biswas MD    08/21/2019	08/21/2019	oxycodone-acetaminophen 5-325 mg tab	60	30	Tee Biswas MD (13 Nov 2019 22:00)    Patient had evaluation done by Endocrine for DM1, cleared to go home today with some adjustment in her insulin dose. Renal consult done for ESRD by Dr. Daisy Burger had HD done

## 2019-11-15 NOTE — DISCHARGE NOTE PROVIDER - NSDCFUADDINST_GEN_ALL_CORE_FT
Please take additional 2 units of Humalog insulin if you are eating snacks. Check your sugar at 2 AM every night to avoid Hypoglycemia ( instructed by endo team)

## 2019-11-15 NOTE — PROGRESS NOTE ADULT - SUBJECTIVE AND OBJECTIVE BOX
DIABETES FOLLOW UP NOTE: Saw pt earlier today  INTERVAL HX: 65y/o F well known to diabetes team from frequent admissions with SOB/CP. Pt w/h/o uncontrolled TIDM (HbA1c 8.1% 9/19 now 8.7%). DM c/b neuropathy and retinopathy as well as CAD s/p multiple stents, CHF (EF 50% 10/2018), TIA, PVD s/p b/l fem-pop bypass, ESRD> HD. Pt was recently discharged after having worsening dyspnea and hypoxemia requiring steroids with rebound hyperglycemia. Now presenting with worsening SOB, with chronic productive cough. Per EMS, FSBS in the 500s. > Pt reports good adherence to medications and doesn't miss insulin doses. However, per staff and team pt was eating cookies and Fig Newtons overnight after HD causing rebound hyperglycemia with BG >300s last night. Pt received a total of Humalog 7 units at night with FBG of 93.  Pt states she feels better.  Remains with O2 this am.  States SOB improved.       Review of Systems:  General: As above  Cardiovascular: No chest pain, palpitations  Respiratory: No SOB, no cough  GI: No nausea, vomiting, abdominal pain  Endocrine: no polyuria, polydipsia or S&Sx of hypoglycemia    Allergies    No Known Allergies    Intolerances      MEDICATIONS:  atorvastatin 40 milliGRAM(s) Oral at bedtime  insulin glargine Injectable (LANTUS) 12 Unit(s) SubCutaneous at bedtime  insulin lispro (HumaLOG) corrective regimen sliding scale   SubCutaneous three times a day before meals  insulin lispro (HumaLOG) corrective regimen sliding scale   SubCutaneous at bedtime  insulin lispro Injectable (HumaLOG) 4 Unit(s) SubCutaneous three times a day before meals    PHYSICAL EXAM:  VITALS: T(C): 36.7 (11-15-19 @ 11:38)  T(F): 98 (11-15-19 @ 11:38), Max: 98.3 (11-15-19 @ 06:16)  HR: 74 (11-15-19 @ 11:38) (72 - 82)  BP: 116/61 (11-15-19 @ 11:38) (96/53 - 116/61)  RR:  (17 - 18)  SpO2:  (95% - 100%)  Wt(kg): --  GENERAL: Female laying in bed in NAD  Abdomen: Soft, nontender, non distended  Extremities: Warm, Trace edema in LEs  L>R  NEURO: A&O X3    LABS:  POCT Blood Glucose.: 137 mg/dL (11-15-19 @ 11:38)  POCT Blood Glucose.: 93 mg/dL (11-15-19 @ 07:49)  POCT Blood Glucose.: 336 mg/dL (11-15-19 @ 00:38)  POCT Blood Glucose.: 376 mg/dL (11-14-19 @ 23:29)  POCT Blood Glucose.: 345 mg/dL (11-14-19 @ 21:42)  POCT Blood Glucose.: 152 mg/dL (11-14-19 @ 17:00)  POCT Blood Glucose.: 182 mg/dL (11-14-19 @ 11:47)  POCT Blood Glucose.: 278 mg/dL (11-14-19 @ 07:37)  POCT Blood Glucose.: 119 mg/dL (11-13-19 @ 23:24)  POCT Blood Glucose.: 133 mg/dL (11-13-19 @ 21:51)  POCT Blood Glucose.: 270 mg/dL (11-13-19 @ 20:21)  POCT Blood Glucose.: 361 mg/dL (11-13-19 @ 17:56)                            8.5    4.55  )-----------( 176      ( 15 Nov 2019 06:34 )             27.4       11-14    134<L>  |  94<L>  |  11  ----------------------------<  443<H>  4.7   |  28  |  3.27<H>    EGFR if : 17<L>  EGFR if non : 14<L>    Ca    8.9      11-14  Mg     2.2     11-14  Phos  5.2     11-14    TPro  6.4  /  Alb  3.8  /  TBili  0.2  /  DBili  x   /  AST  19  /  ALT  13  /  AlkPhos  85  11-14      Thyroid Function Tests:  11-14 @ 09:15 TSH 1.78 FreeT4 -- T3 -- Anti TPO -- Anti Thyroglobulin Ab -- TSI --      Hemoglobin A1C, Whole Blood: 8.7 % <H> [4.0 - 5.6] (11-13-19 @ 23:44)  Hemoglobin A1C, Whole Blood: 8.1 % <H> [4.0 - 5.6] (09-16-19 @ 08:04)      11-14 Chol 108 LDL 36 HDL 57 Trig 76

## 2019-11-15 NOTE — PROGRESS NOTE ADULT - PROBLEM SELECTOR PLAN 1
-Test BG ac and hs and 2am every day.  -Continue present insulin regimen of Lantus 12 units and Humalog 4 units ac meals  -Add Humalog 2 units at hs if pt eating snack  -C/w Humalog low dose correction scales ac and hs  -Added adjusted low dose Humalog correction scale at 2am  -Upon discharge pt to continue preadmission insulin doses of L12/H4. Family adjust insulin accordingly at home   -Pt to f/u with Dr Ley> pvt endo as out pt.  -Plan discussed with pt/team.  Contact info: [phone] (24/7). pager [phone]

## 2019-11-15 NOTE — DISCHARGE NOTE PROVIDER - NSDCMRMEDTOKEN_GEN_ALL_CORE_FT
aspirin 81 mg oral delayed release tablet: 1 tab(s) orally once a day  atorvastatin 40 mg oral tablet: 1 tab(s) orally once a day (at bedtime)  budesonide 0.5 mg/2 mL inhalation suspension: 2 milliliter(s) by nebulizer 2 times a day   clopidogrel 75 mg oral tablet: 1 tab(s) orally once a day  docusate sodium 100 mg oral capsule: 1 cap(s) orally 3 times a day  hydrALAZINE 25 mg oral tablet: 1 tab(s) orally 3 times a day  ipratropium-albuterol 0.5 mg-2.5 mg/3 mLinhalation solution: 3 milliliter(s) inhaled every 6 hours as needed for wheezing/shortness of breath  isosorbide dinitrate 10 mg oral tablet: 1 tab(s) orally 3 times a day  Lantus 100 units/mL subcutaneous solution: 12 unit(s) subcutaneous once a day (at bedtime)  metoprolol succinate 50 mg oral tablet, extended release: 1 tab(s) orally once a day  Multiple Vitamins oral tablet: 1 tab(s) orally once a day  NovoLOG FlexPen 100 units/mL injectable solution: 4 unit(s) injectable 3 times a day  pantoprazole 40 mg oral delayed release tablet: 1 tab(s) orally once a day  Percocet 5/325 oral tablet: 1 tab(s) orally 2 times a day  senna oral tablet: 2 tab(s) orally once a day (at bedtime), As needed, Constipation  sevelamer carbonate 800 mg oral tablet: 1 tab(s) orally 3 times a day (with meals)

## 2019-11-15 NOTE — PROGRESS NOTE ADULT - ASSESSMENT
· Assessment		  64F c hx CAD (Cath Feb '19 showing 99% RCA, 100% RPLS, 100% Cx) s/p multiple stents/brachytherapy, ESRD (on HD M/T/T/Sa), DM1 c/b neuropathy and retinopathy, CHF (EF 30% per last [facility]), mod MR, severe AS, RHF, TIA, severe PVD s/p b/l fempop bypass c/b left thrombosed graft, left subclavian vein stenosis s/p stent, COPD not on O2, gout, hilar lymphadenopathy of unknown etiology, frequent hospitalizations (usually 1-3 times a month) pw SOB, cough, hyperkalemia    Hyperkalemia.   - resolved  - HD.     SOB (shortness of breath) improved.  - either COPD exacerbation vs CHF  - cont nebs and budesonide  - hold off diuresis and abx for now.     Lung nodules  - follow    Hyperglycemia due to type 1 diabetes mellitus  - improving with humalog  - continue lantus   - endocrine evaluation noted    Chronic combined systolic and diastolic congestive heart failure.   - cardiology follow  - cont BB.     Coronary artery disease involving native coronary artery of native heart, angina presence unspecified.  - pt has chronic hsTROP elevation, see prior trops, 400s in Sept, 200s in July.  - pt has known occlusive CAD on medical management  - cont home asa/plavix, lipitor, BB, bp meds  - cardiology follow     ESRD (end stage renal disease) on dialysis.  - renal consult noted  - cont renagel  - HD    Medically stable.    DC home. Follow with Nephrology/ Cardiology/ Endocrine in 3-4 days. QA  Pierce Viera MD pager [phone]

## 2019-11-15 NOTE — DISCHARGE NOTE NURSING/CASE MANAGEMENT/SOCIAL WORK - PATIENT PORTAL LINK FT
You can access the FollowMyHealth Patient Portal offered by [facility] by registering at the following website: [email or web address]. By joining [employer]’s FollowMyHealth portal, you will also be able to view your health information using other applications (apps) compatible with our system.

## 2019-11-15 NOTE — DISCHARGE NOTE PROVIDER - NSDCCPTREATMENT_GEN_ALL_CORE_FT
PRINCIPAL PROCEDURE  Procedure: Hemodialysis care plan  Findings and Treatment: patient is ESRD and had HD done      SECONDARY PROCEDURE  Procedure: Transthoracic echo  Findings and Treatment: Conclusions:  1. Mitral annular calcification. Moderate to severe mitral  regurgitation (severity may be underestimated).  Mild-moderate mitral stenosis (mean gradient = 4 mm Hg).  2. Calcified trileaflet aortic valve with decreased  opening. Peak transaortic valve gradient equals 30 mm Hg,  mean transaortic valve gradient equals 20 mm Hg, estimated  aortic valve area equals 0.7 sqcm (by continuity equation),  aortic valve velocity time integral equals 70 cm,  consistent with severe aortic stenosis. Mild aortic  regurgitation.  3. Normal left ventricular internal dimensions and wall  thicknesses.  4. Moderate segmental left ventricular systolic  dysfunction. The inferior and inferolateral walls are  hypocontractile.  Flattening of the interventricular septum  in both systole is consistent with right ventricular volume  overload.  5. Increased E/e'  is consistent with elevated left  ventricular filling pressure.  6. Normal right ventricular size with decreased right  ventricular systolic function.  7. Moderate tricuspid regurgitation.  8. An atrial septal closure device is seen on the  interatrial septum and appears well-seated with no residual  interatrial flow by color doppler.

## 2019-11-15 NOTE — DISCHARGE NOTE PROVIDER - NSDCCAREPROVSEEN_GEN_ALL_CORE_FT
Pierce Viera  [facility] Endocrinology, Team  [facility] Medicine, Advance PracticeTeam  Daisy Burger

## 2019-11-15 NOTE — PHYSICAL THERAPY INITIAL EVALUATION ADULT - PERTINENT HX OF CURRENT PROBLEM, REHAB EVAL
64F c hx CAD (Cath Feb '19 showing 99% RCA, 100% RPLS, 100% Cx) s/p multiple stents/brachytherapy, ESRD on HD, DM1 c/b neuropathy and retinopathy, CHF, mod MR, severe AS, RHF, TIA, severe PVD s/p b/l fempop bypass c/b left thrombosed graft, left subclavian vein stenosis s/p stent, COPD not on O2, gout, hilar lymphadenopathy of unknown etiology, frequent hospitalizations (usually 1-3 times a month) pw SOB, coughing for 2 weeks. last hd yesterday admitted with sob and hyperkalemia

## 2019-11-15 NOTE — PROGRESS NOTE ADULT - ASSESSMENT
63y/o F well known to diabetes team from frequent admissions with SOB/CP. Pt w/h/o uncontrolled TIDM (HbA1c 8.1% 9/19 now 8.7%). DM c/b neuropathy and retinopathy as well as CAD s/p multiple stents, CHF (EF 50% 10/2018), TIA, PVD s/p b/l fem-pop bypass, ESRD> HD. Pt was recently discharged after having worsening dyspnea and hypoxemia requiring steroids with rebound hyperglycemia. Now presenting with worsening SOB and chronic productive cough. Per EMS, FSBS in the 500s. > Pt reports good adherence to medications and doesn't miss insulin doses. However, per staff and team pt was eating cookies and Fig Newtons overnight after HD causing rebound hyperglycemia with BG >300s over night. Pt received a total of Humalog 7 units with FBG of 93.  Pt states she feels better, SOB improved. Remains with O2 this am.  No hypoglycemia  Hyperglycemia on this pt is mostly caused by non adherence to diet. Pt's family assist with DM care since pt doesn't have insight to do it independently.

## 2019-11-15 NOTE — PROGRESS NOTE ADULT - SUBJECTIVE AND OBJECTIVE BOX
[facility]   [phone]  RENAL FOLLOW UP NOTE  --------------------------------------------------------------------------------  Chief Complaint:    24 hour events/subjective:    seen earlier. states feels better. no sob wants to go home ASAP    PAST HISTORY  --------------------------------------------------------------------------------  No significant changes to PMH, PSH, FHx, SHx, unless otherwise noted    ALLERGIES & MEDICATIONS  --------------------------------------------------------------------------------  Allergies    No Known Allergies    Intolerances      Standing Inpatient Medications  albuterol/ipratropium for Nebulization 3 milliLiter(s) Nebulizer every 6 hours  aspirin enteric coated 81 milliGRAM(s) Oral daily  atorvastatin 40 milliGRAM(s) Oral at bedtime  buDESOnide    Inhalation Suspension 0.5 milliGRAM(s) Inhalation two times a day  clopidogrel Tablet 75 milliGRAM(s) Oral daily  dextrose 5%. 1000 milliLiter(s) IV Continuous <Continuous>  dextrose 50% Injectable 12.5 Gram(s) IV Push once  dextrose 50% Injectable 25 Gram(s) IV Push once  dextrose 50% Injectable 25 Gram(s) IV Push once  heparin  Injectable 5000 Unit(s) SubCutaneous three times a day  insulin glargine Injectable (LANTUS) 12 Unit(s) SubCutaneous at bedtime  insulin lispro (HumaLOG) corrective regimen sliding scale   SubCutaneous three times a day before meals  insulin lispro (HumaLOG) corrective regimen sliding scale   SubCutaneous at bedtime  insulin lispro (HumaLOG) corrective regimen sliding scale   SubCutaneous <User Schedule>  insulin lispro Injectable (HumaLOG) 2 Unit(s) SubCutaneous at bedtime  insulin lispro Injectable (HumaLOG) 4 Unit(s) SubCutaneous three times a day before meals  isosorbide   dinitrate Tablet (ISORDIL) 10 milliGRAM(s) Oral three times a day  metoprolol succinate ER 50 milliGRAM(s) Oral daily  pantoprazole    Tablet 40 milliGRAM(s) Oral before breakfast  sevelamer carbonate 800 milliGRAM(s) Oral three times a day with meals    PRN Inpatient Medications  dextrose 40% Gel 15 Gram(s) Oral once PRN  glucagon  Injectable 1 milliGRAM(s) IntraMuscular once PRN  hydrALAZINE 25 milliGRAM(s) Oral three times a day PRN  oxycodone    5 mG/acetaminophen 325 mG 1 Tablet(s) Oral every 4 hours PRN      REVIEW OF SYSTEMS  --------------------------------------------------------------------------------    Gen: denies fevers/chills,  CVS: denies chest pain/palpitations  Resp: denies SOB/Cough  GI: Denies N/V/Abd pain  : Denies dysuria    All other systems were reviewed and are negative, except as noted.    VITALS/PHYSICAL EXAM  --------------------------------------------------------------------------------  T(C): 36.5 (11-15-19 @ 17:53), Max: 36.8 (11-15-19 @ 06:16)  HR: 79 (11-15-19 @ 17:53) (72 - 82)  BP: 115/65 (11-15-19 @ 17:53) (106/54 - 116/61)  RR: 18 (11-15-19 @ 17:53) (17 - 19)  SpO2: 95% (11-15-19 @ 17:53) (95% - 100%)  Wt(kg): --  Height (cm): 162.6 (11-14-19 @ 03:02)  Weight (kg): 54.8 (11-14-19 @ 03:02)  BMI (kg/m2): 20.7 (11-14-19 @ 03:02)  BSA (m2): 1.58 (11-14-19 @ 03:02)      11-14-19 @ 07:01  -  11-15-19 @ 07:00  --------------------------------------------------------  IN: 1280 mL / OUT: 2700 mL / NET: -1420 mL    11-15-19 @ 07:01  -  11-15-19 @ 21:39  --------------------------------------------------------  IN: 480 mL / OUT: 0 mL / NET: 480 mL      Physical Exam:  	  	Gen:  comfortable appearing   	no jvd  	Pulm: decrease bs  no rales or ronchi no wheezing   	CV: RRR, S1S2; no rub  	Back: No CVA tendernes  	Abd: +BS, soft, nontender/nondistended  	: No suprapubic tenderness  	UE: Warm, no cyanosis  no clubbing,  no edema;  	LE: Warm, no cyanosis  no clubbing, LLE 2-  edema  	Neuro: alert and oriented. speech coherent   	Skin: Warm, no decrease skin turgor   	Vascular access: + avf LUE + bruit and thrill     	    LABS/STUDIES  --------------------------------------------------------------------------------              8.5    4.55  >-----------<  176      [11-15-19 @ 06:34]              27.4     134  |  94  |  11  ----------------------------<  443      [11-14-19 @ 22:49]  4.7   |  28  |  3.27        Ca     8.9     [11-14-19 @ 22:49]      Mg     2.2     [11-14-19 @ 07:08]      Phos  5.2     [11-14-19 @ 07:08]    TPro  6.4  /  Alb  3.8  /  TBili  0.2  /  DBili  x   /  AST  19  /  ALT  13  /  AlkPhos  85  [11-14-19 @ 07:08]    PT/INR: PT 12.0 , INR 1.05       [11-14-19 @ 09:33]  PTT: 30.0       [11-14-19 @ 09:33]          [11-14-19 @ 07:08]    Creatinine Trend:  SCr 3.27 [11-14 @ 22:49]  SCr 4.52 [11-14 @ 07:08]  SCr 6.42 [11-13 @ 23:01]  SCr 6.20 [11-13 @ 18:44]                  Iron 42, TIBC 253, %sat 17      [06-17-19 @ 17:54]  Ferritin 1838      [06-17-19 @ 18:06]  PTH -- (Ca 9.6)      [06-17-19 @ 18:06]   177  PTH -- (Ca 7.8)      [03-29-19 @ 20:38]   73  PTH -- (Ca 7.3)      [02-22-19 @ 02:49]   59  HbA1c 8.7      [11-13-19 @ 23:44]  TSH 1.78      [11-14-19 @ 09:15]  Lipid: chol 108, TG 76, HDL 57, LDL 36      [11-14-19 @ 09:16]

## 2019-11-15 NOTE — PROGRESS NOTE ADULT - ASSESSMENT
64F c hx CAD (Cath Feb '19 showing 99% RCA, 100% RPLS, 100% Cx) s/p multiple stents/brachytherapy, ESRD (on HD M/T/T/Sa), DM1 c/b neuropathy and retinopathy, CHF (EF 30% per last [facility]), mod MR, severe AS, RHF, TIA, severe PVD s/p b/l fempop bypass c/b left thrombosed graft, left subclavian vein stenosis s/p stent, COPD not on O2, gout, hilar lymphadenopathy of unknown etiology, frequent hospitalizations (usually 1-3 times a month) pw SOB, coughing for 2 weeks. last hd yesterday admitted with sob and hyperkalemia      1- hyperkalemia resolved   2- chf  3- HTN hx  4- ESRD  5- SHPT  6- anemia     hd am pt states will have outpt in am   cont toprol xl 50 mg daily   cont with humberto   d/w Dr. Viera

## 2019-11-15 NOTE — PROGRESS NOTE ADULT - SUBJECTIVE AND OBJECTIVE BOX
Cardiovascular Disease Progress Note    Overnight events: No acute events overnight.  6 beats of nsvt overnight. no new cardiac sx  Otherwise review of systems negative    Objective Findings:  T(C): 36.8 (11-15-19 @ 06:16), Max: 36.8 (11-15-19 @ 06:16)  HR: 82 (11-15-19 @ 06:16) (72 - 82)  BP: 113/60 (11-15-19 @ 06:16) (96/53 - 116/52)  RR: 17 (11-15-19 @ 06:16) (17 - 18)  SpO2: 100% (11-15-19 @ 06:16) (95% - 100%)  Wt(kg): --  Daily     Daily Weight in k.6 (2019 18:40)      Physical Exam:  Gen: NAD  HEENT: EOMI  CV: RRR, normal S1 + S2, 2/6 heraclio  Lungs: CTAB  Abd: soft, non-tender  Ext: trace edema    Telemetry: nsr 6 beats nsvt    Laboratory Data:                        8.5    4.55  )-----------( 176      ( 15 Nov 2019 06:34 )             27.4     11-14    134<L>  |  94<L>  |  11  ----------------------------<  443<H>  4.7   |  28  |  3.27<H>    Ca    8.9      2019 22:49  Phos  5.2     -  Mg     2.2     -    TPro  6.4  /  Alb  3.8  /  TBili  0.2  /  DBili  x   /  AST  19  /  ALT  13  /  AlkPhos  85  11-14    PT/INR - ( 2019 09:33 )   PT: 12.0 sec;   INR: 1.05 ratio         PTT - ( 2019 09:33 )  PTT:30.0 sec  CARDIAC MARKERS ( 2019 07:08 )  x     / x     / 456 U/L / x     / 8.2 ng/mL  CARDIAC MARKERS ( 2019 23:01 )  x     / x     / 647 U/L / x     / 11.3 ng/mL          Inpatient Medications:  MEDICATIONS  (STANDING):  albuterol/ipratropium for Nebulization 3 milliLiter(s) Nebulizer every 6 hours  aspirin enteric coated 81 milliGRAM(s) Oral daily  atorvastatin 40 milliGRAM(s) Oral at bedtime  buDESOnide    Inhalation Suspension 0.5 milliGRAM(s) Inhalation two times a day  clopidogrel Tablet 75 milliGRAM(s) Oral daily  dextrose 5%. 1000 milliLiter(s) (50 mL/Hr) IV Continuous <Continuous>  dextrose 50% Injectable 12.5 Gram(s) IV Push once  dextrose 50% Injectable 25 Gram(s) IV Push once  dextrose 50% Injectable 25 Gram(s) IV Push once  heparin  Injectable 5000 Unit(s) SubCutaneous three times a day  insulin glargine Injectable (LANTUS) 12 Unit(s) SubCutaneous at bedtime  insulin lispro (HumaLOG) corrective regimen sliding scale   SubCutaneous three times a day before meals  insulin lispro (HumaLOG) corrective regimen sliding scale   SubCutaneous at bedtime  insulin lispro Injectable (HumaLOG) 4 Unit(s) SubCutaneous three times a day before meals  isosorbide   dinitrate Tablet (ISORDIL) 10 milliGRAM(s) Oral three times a day  metoprolol succinate ER 50 milliGRAM(s) Oral daily  pantoprazole    Tablet 40 milliGRAM(s) Oral before breakfast  sevelamer carbonate 800 milliGRAM(s) Oral three times a day with meals      Assessment:  -SOB  -pAT  -elevated but stable cardiac enzymes (hs trop) with recent admission with relatively preserved ck/ck mb fraction and no obvious ischemic changes on ekg  -hx of NSVT  -pAT  -volume overload  -ischemic cardiomyopathy, cad s/p multiple stents  -esrd on hd  -PAD s/p prior intervention   -elecytrolyte abnormalities        Recs:  -hd per renal  -c/w volume removal with hd. appears euvolemic. dyspnea resolved  -known extensive CAD and ICM. s/p [facility] 2019 --> severe and diffuse instent restenosis along RCA --> unable to stent due to multiple layers of stenting and prior brachytherapy. denies any true ischemic sx at present  -c/w beta blockers for nsvt/pat/cad (as above). currently on toprol 50mg, isordil 10mg tid, hydral 25mg tid. uptitrate GDMT as tolerates. wouldnt inc bb at this time 2/2 bronchospasm  -hx of prolonged qtc. avoid qtc prolonging meds  -s/p [facility] 2019: mod-severe MR, pseudo AS (low flow-low gradient), mod-severe LV dysfunction --> recent CTS consult with dr hebert appreciated. plan is for medical management given surgical risk and patient preference  -c/w asa, plavix, statin for ischemic cardiomyopathy/CAD/PAD  -dvt ppx        Over 25 minutes spent on total encounter; more than 50% of the visit was spent counseling and/or coordinating care by the attending physician.      Julián Biswas MD   Cardiovascular Disease  [phone]

## 2019-11-15 NOTE — CHART NOTE - NSCHARTNOTEFT_GEN_A_CORE
Patient noted to have elevated glucose levels this evenin at 2142, 376 at 2329, 336 at 0038. 5 units humalog ordered for 376 glucose. When rechecking glucose an hour later, RN noticed Fig Newtons and packets of sheila crackers on bedside table. When asked who gave them to her she said "they gave them to me at dialysis". Patient reeducated by RN regarding carb consumption and that hyperglycemia will only delay her discharge. Glucose to be reassessed in AM.    Aimee Honeycutt NP  [phone]

## 2019-11-15 NOTE — DISCHARGE NOTE PROVIDER - NSDCCPCAREPLAN_GEN_ALL_CORE_FT
PRINCIPAL DISCHARGE DIAGNOSIS  Diagnosis: Hyperkalemia  Assessment and Plan of Treatment: Patient's Potassium return to normal now      SECONDARY DISCHARGE DIAGNOSES  Diagnosis: Essential hypertension  Assessment and Plan of Treatment: Continue with your blood pressure medications; eat a heart healthy diet with low salt diet; exercise regularly (consult with your physician or cardiologist first); maintain a heart healthy weight; if you smoke - quit (A resource to help you stop smoking is the [facility] [employer] – phone number [phone].); include healthy ways to manage stress. Continue to follow with your primary care physician or cardiologist.      Diagnosis: CHF exacerbation  Assessment and Plan of Treatment: Take your medications as prescribed. Follow a  low-salt,  low cholesterol heart healthy diet. Weigh yourself every day; call your doctor if you gain 2 pounds over one to two days or 3 pounds over three days. Get to or maintain a healthy weight; ask your heart failure team for referrals to a registered dietitian if needed. Be active (check with your physician or cardiologist first). Find healthy ways to deal with stress, such as deep breathing, meditation, exercise, and doing hobbies that you enjoy. If you smoke, quit. (A resource to help you stop smoking is the [facility] [employer] – phone number [phone].).      Diagnosis: Hyperglycemia  Assessment and Plan of Treatment: Continue to follow with your primary care MD or your endocrinologist.  Follow a heart healthy diabetic diet. If you check your fingerstick glucose at home, call your MD if it is greater than 250mg/dL on 2 occasions or less than 100mg/dL on 2 occasions. Know signs of low blood sugar, such as: dizziness, shakiness, sweating, confusion, hunger, nervousness-drink 4 ounces apple juice if occurs and call your doctor. Know early signs of high blood sugar, such as: frequent urination, increased thirst, blurry vision, fatigue, headache - call your doctor if this occurs. Follow with other practitioners to care for your diabetes, such as ophthamologist and podiatrist.

## 2019-11-15 NOTE — PROGRESS NOTE ADULT - SUBJECTIVE AND OBJECTIVE BOX
Patient is a 64y old  Female who presents with a chief complaint of SOB (15 Nov 2019 15:26)      SUBJECTIVE / OVERNIGHT EVENTS: feels better. Wants to go home.  Review of Systems  chest pain no  palpitations no  sob no  nausea no  headache no    MEDICATIONS  (STANDING):  albuterol/ipratropium for Nebulization 3 milliLiter(s) Nebulizer every 6 hours  aspirin enteric coated 81 milliGRAM(s) Oral daily  atorvastatin 40 milliGRAM(s) Oral at bedtime  buDESOnide    Inhalation Suspension 0.5 milliGRAM(s) Inhalation two times a day  clopidogrel Tablet 75 milliGRAM(s) Oral daily  dextrose 5%. 1000 milliLiter(s) (50 mL/Hr) IV Continuous <Continuous>  dextrose 50% Injectable 12.5 Gram(s) IV Push once  dextrose 50% Injectable 25 Gram(s) IV Push once  dextrose 50% Injectable 25 Gram(s) IV Push once  heparin  Injectable 5000 Unit(s) SubCutaneous three times a day  insulin glargine Injectable (LANTUS) 12 Unit(s) SubCutaneous at bedtime  insulin lispro (HumaLOG) corrective regimen sliding scale   SubCutaneous three times a day before meals  insulin lispro (HumaLOG) corrective regimen sliding scale   SubCutaneous at bedtime  insulin lispro (HumaLOG) corrective regimen sliding scale   SubCutaneous <User Schedule>  insulin lispro Injectable (HumaLOG) 2 Unit(s) SubCutaneous at bedtime  insulin lispro Injectable (HumaLOG) 4 Unit(s) SubCutaneous three times a day before meals  isosorbide   dinitrate Tablet (ISORDIL) 10 milliGRAM(s) Oral three times a day  metoprolol succinate ER 50 milliGRAM(s) Oral daily  pantoprazole    Tablet 40 milliGRAM(s) Oral before breakfast  sevelamer carbonate 800 milliGRAM(s) Oral three times a day with meals    MEDICATIONS  (PRN):  dextrose 40% Gel 15 Gram(s) Oral once PRN Blood Glucose LESS THAN 70 milliGRAM(s)/deciliter  glucagon  Injectable 1 milliGRAM(s) IntraMuscular once PRN Glucose LESS THAN 70 milligrams/deciliter  hydrALAZINE 25 milliGRAM(s) Oral three times a day PRN Systolic blood pressure > 170  oxycodone    5 mG/acetaminophen 325 mG 1 Tablet(s) Oral every 4 hours PRN Moderate Pain (4 - 6)      Vital Signs Last 24 Hrs  T(C): 36.7 (15 Nov 2019 11:38), Max: 36.8 (15 Nov 2019 06:16)  T(F): 98 (15 Nov 2019 11:38), Max: 98.3 (15 Nov 2019 06:16)  HR: 72 (15 Nov 2019 14:17) (72 - 82)  BP: 106/54 (15 Nov 2019 14:17) (96/53 - 116/61)  BP(mean): --  RR: 18 (15 Nov 2019 14:17) (17 - 19)  SpO2: 100% (15 Nov 2019 14:17) (95% - 100%)    PHYSICAL EXAM:  GENERAL: NAD, well-developed  HEAD:  Atraumatic, Normocephalic  EYES: EOMI, PERRLA, conjunctiva and sclera clear  NECK: Supple, No JVD  CHEST/LUNG: Clear to auscultation bilaterally; No wheeze  HEART: Regular rate and rhythm; No murmurs, rubs, or gallops  ABDOMEN: Soft, Nontender, Nondistended; Bowel sounds present  EXTREMITIES:  2+ Peripheral Pulses, No clubbing, cyanosis, or edema  PSYCH: AAOx3  NEUROLOGY: non-focal  SKIN: No rashes or lesions    LABS:                        8.5    4.55  )-----------( 176      ( 15 Nov 2019 06:34 )             27.4     11-14    134<L>  |  94<L>  |  11  ----------------------------<  443<H>  4.7   |  28  |  3.27<H>    Ca    8.9      14 Nov 2019 22:49  Phos  5.2     11-14  Mg     2.2     11-14    TPro  6.4  /  Alb  3.8  /  TBili  0.2  /  DBili  x   /  AST  19  /  ALT  13  /  AlkPhos  85  11-14    PT/INR - ( 14 Nov 2019 09:33 )   PT: 12.0 sec;   INR: 1.05 ratio         PTT - ( 14 Nov 2019 09:33 )  PTT:30.0 sec  CARDIAC MARKERS ( 14 Nov 2019 07:08 )  x     / x     / 456 U/L / x     / 8.2 ng/mL  CARDIAC MARKERS ( 13 Nov 2019 23:01 )  x     / x     / 647 U/L / x     / 11.3 ng/mL            RADIOLOGY & ADDITIONAL TESTS:    Imaging Personally Reviewed:    Consultant(s) Notes Reviewed:      Care Discussed with Consultants/Other Providers:

## 2019-11-24 ENCOUNTER — EMERGENCY (EMERGENCY)
Facility: HOSPITAL | Age: 64
LOS: 1 days | Discharge: HOME | End: 2019-11-24
Attending: EMERGENCY MEDICINE
Payer: MEDICARE

## 2019-11-24 VITALS
SYSTOLIC BLOOD PRESSURE: 125 MMHG | RESPIRATION RATE: 17 BRPM | DIASTOLIC BLOOD PRESSURE: 62 MMHG | TEMPERATURE: 98 F | OXYGEN SATURATION: 98 % | HEART RATE: 77 BPM

## 2019-11-24 VITALS
HEART RATE: 76 BPM | RESPIRATION RATE: 16 BRPM | HEIGHT: 64 IN | WEIGHT: 119.93 LBS | TEMPERATURE: 98 F | SYSTOLIC BLOOD PRESSURE: 111 MMHG | OXYGEN SATURATION: 96 % | DIASTOLIC BLOOD PRESSURE: 64 MMHG

## 2019-11-24 DIAGNOSIS — Z98.89 OTHER SPECIFIED POSTPROCEDURAL STATES: Chronic | ICD-10-CM

## 2019-11-24 LAB
ALBUMIN SERPL ELPH-MCNC: 4.3 G/DL — SIGNIFICANT CHANGE UP (ref 3.3–5)
ALP SERPL-CCNC: 104 U/L — SIGNIFICANT CHANGE UP (ref 40–120)
ALT FLD-CCNC: 12 U/L — SIGNIFICANT CHANGE UP (ref 10–45)
ANION GAP SERPL CALC-SCNC: 17 MMOL/L — SIGNIFICANT CHANGE UP (ref 5–17)
ANISOCYTOSIS BLD QL: SLIGHT — SIGNIFICANT CHANGE UP
AST SERPL-CCNC: 17 U/L — SIGNIFICANT CHANGE UP (ref 10–40)
BASE EXCESS BLDV CALC-SCNC: 6.5 MMOL/L — HIGH (ref -2–2)
BASOPHILS # BLD AUTO: 0.09 K/UL — SIGNIFICANT CHANGE UP (ref 0–0.2)
BASOPHILS NFR BLD AUTO: 1.7 % — SIGNIFICANT CHANGE UP (ref 0–2)
BILIRUB SERPL-MCNC: 0.2 MG/DL — SIGNIFICANT CHANGE UP (ref 0.2–1.2)
BUN SERPL-MCNC: 24 MG/DL — HIGH (ref 7–23)
CALCIUM SERPL-MCNC: 9.4 MG/DL — SIGNIFICANT CHANGE UP (ref 8.4–10.5)
CHLORIDE SERPL-SCNC: 97 MMOL/L — SIGNIFICANT CHANGE UP (ref 96–108)
CO2 BLDV-SCNC: 34 MMOL/L — HIGH (ref 22–30)
CO2 SERPL-SCNC: 29 MMOL/L — SIGNIFICANT CHANGE UP (ref 22–31)
CREAT SERPL-MCNC: 4.85 MG/DL — HIGH (ref 0.5–1.3)
EOSINOPHIL # BLD AUTO: 0.13 K/UL — SIGNIFICANT CHANGE UP (ref 0–0.5)
EOSINOPHIL NFR BLD AUTO: 2.6 % — SIGNIFICANT CHANGE UP (ref 0–6)
GAS PNL BLDV: SIGNIFICANT CHANGE UP
GLUCOSE SERPL-MCNC: 181 MG/DL — HIGH (ref 70–99)
HCO3 BLDV-SCNC: 32 MMOL/L — HIGH (ref 21–29)
HCT VFR BLD CALC: 29.7 % — LOW (ref 34.5–45)
HGB BLD-MCNC: 9.4 G/DL — LOW (ref 11.5–15.5)
HOROWITZ INDEX BLDV+IHG-RTO: SIGNIFICANT CHANGE UP
LIDOCAIN IGE QN: 64 U/L — HIGH (ref 7–60)
LYMPHOCYTES # BLD AUTO: 1.05 K/UL — SIGNIFICANT CHANGE UP (ref 1–3.3)
LYMPHOCYTES # BLD AUTO: 20.9 % — SIGNIFICANT CHANGE UP (ref 13–44)
MACROCYTES BLD QL: SLIGHT — SIGNIFICANT CHANGE UP
MANUAL SMEAR VERIFICATION: SIGNIFICANT CHANGE UP
MCHC RBC-ENTMCNC: 31.6 GM/DL — LOW (ref 32–36)
MCHC RBC-ENTMCNC: 33.6 PG — SIGNIFICANT CHANGE UP (ref 27–34)
MCV RBC AUTO: 106.1 FL — HIGH (ref 80–100)
METAMYELOCYTES # FLD: 0.9 % — HIGH (ref 0–0)
MONOCYTES # BLD AUTO: 0.44 K/UL — SIGNIFICANT CHANGE UP (ref 0–0.9)
MONOCYTES NFR BLD AUTO: 8.7 % — SIGNIFICANT CHANGE UP (ref 2–14)
NEUTROPHILS # BLD AUTO: 3.22 K/UL — SIGNIFICANT CHANGE UP (ref 1.8–7.4)
NEUTROPHILS NFR BLD AUTO: 64.3 % — SIGNIFICANT CHANGE UP (ref 43–77)
PCO2 BLDV: 54 MMHG — HIGH (ref 35–50)
PH BLDV: 7.39 — SIGNIFICANT CHANGE UP (ref 7.35–7.45)
PLAT MORPH BLD: NORMAL — SIGNIFICANT CHANGE UP
PLATELET # BLD AUTO: 224 K/UL — SIGNIFICANT CHANGE UP (ref 150–400)
PO2 BLDV: 23 MMHG — LOW (ref 25–45)
POTASSIUM SERPL-MCNC: 4.4 MMOL/L — SIGNIFICANT CHANGE UP (ref 3.5–5.3)
POTASSIUM SERPL-SCNC: 4.4 MMOL/L — SIGNIFICANT CHANGE UP (ref 3.5–5.3)
PROT SERPL-MCNC: 7.4 G/DL — SIGNIFICANT CHANGE UP (ref 6–8.3)
RBC # BLD: 2.8 M/UL — LOW (ref 3.8–5.2)
RBC # FLD: 13.4 % — SIGNIFICANT CHANGE UP (ref 10.3–14.5)
RBC BLD AUTO: SIGNIFICANT CHANGE UP
SAO2 % BLDV: 31 % — LOW (ref 67–88)
SODIUM SERPL-SCNC: 143 MMOL/L — SIGNIFICANT CHANGE UP (ref 135–145)
TROPONIN T, HIGH SENSITIVITY RESULT: 280 NG/L — HIGH (ref 0–51)
TROPONIN T, HIGH SENSITIVITY RESULT: 300 NG/L — HIGH (ref 0–51)
VARIANT LYMPHS # BLD: 0.9 % — SIGNIFICANT CHANGE UP (ref 0–6)
WBC # BLD: 5.01 K/UL — SIGNIFICANT CHANGE UP (ref 3.8–10.5)
WBC # FLD AUTO: 5.01 K/UL — SIGNIFICANT CHANGE UP (ref 3.8–10.5)

## 2019-11-24 PROCEDURE — 96374 THER/PROPH/DIAG INJ IV PUSH: CPT | Mod: XU

## 2019-11-24 PROCEDURE — 71045 X-RAY EXAM CHEST 1 VIEW: CPT

## 2019-11-24 PROCEDURE — 71045 X-RAY EXAM CHEST 1 VIEW: CPT | Mod: 26

## 2019-11-24 PROCEDURE — 82803 BLOOD GASES ANY COMBINATION: CPT

## 2019-11-24 PROCEDURE — 99284 EMERGENCY DEPT VISIT MOD MDM: CPT | Mod: GC

## 2019-11-24 PROCEDURE — 83690 ASSAY OF LIPASE: CPT

## 2019-11-24 PROCEDURE — 84484 ASSAY OF TROPONIN QUANT: CPT

## 2019-11-24 PROCEDURE — 74177 CT ABD & PELVIS W/CONTRAST: CPT | Mod: 26

## 2019-11-24 PROCEDURE — 85027 COMPLETE CBC AUTOMATED: CPT

## 2019-11-24 PROCEDURE — 99284 EMERGENCY DEPT VISIT MOD MDM: CPT | Mod: 25

## 2019-11-24 PROCEDURE — 74177 CT ABD & PELVIS W/CONTRAST: CPT

## 2019-11-24 PROCEDURE — 93005 ELECTROCARDIOGRAM TRACING: CPT

## 2019-11-24 PROCEDURE — 80053 COMPREHEN METABOLIC PANEL: CPT

## 2019-11-24 PROCEDURE — 82962 GLUCOSE BLOOD TEST: CPT

## 2019-11-24 RX ORDER — SODIUM CHLORIDE 9 MG/ML
500 INJECTION, SOLUTION INTRAVENOUS ONCE
Refills: 0 | Status: DISCONTINUED | OUTPATIENT
Start: 2019-11-24 | End: 2019-11-24

## 2019-11-24 RX ORDER — MORPHINE SULFATE 50 MG/1
4 CAPSULE, EXTENDED RELEASE ORAL ONCE
Refills: 0 | Status: DISCONTINUED | OUTPATIENT
Start: 2019-11-24 | End: 2019-11-24

## 2019-11-24 RX ADMIN — MORPHINE SULFATE 4 MILLIGRAM(S): 50 CAPSULE, EXTENDED RELEASE ORAL at 04:06

## 2019-11-24 NOTE — ED PROVIDER NOTE - PHYSICAL EXAMINATION
*GEN:   uncomfortable, AOx3  *EYES:   pupils equally round and reactive to light, extra-occular movements intact  *HEENT:   airway patent, moist mucosal membranes, full ROM neck  *CV:   regular rate and rhythm  *RESP:   clear to auscultation bilaterally, non-labored  *ABD:   generalized tenderness to palpation  *EXTREM:   no MSK tenderness, full ROM throughout, bilateral LE edema  *SKIN:   dry, intact  *NEURO:   AOx3, no focal weakness or loss of sensation

## 2019-11-24 NOTE — ED PROVIDER NOTE - PATIENT PORTAL LINK FT
You can access the FollowMyHealth Patient Portal offered by [facility] by registering at the following website: [email or web address]. By joining [employer] [facility]’s FollowMyHealth portal, you will also be able to view your health information using other applications (apps) compatible with our system.

## 2019-11-24 NOTE — ED PROVIDER NOTE - PROGRESS NOTE DETAILS
Arthur Whaley PGY3: sx improved, no RUQ pain - pt informed of pertinent CTAP findings including dilated bile duct and pulmonary edema, will f/u outpt

## 2019-11-24 NOTE — ED PROVIDER NOTE - ATTENDING CONTRIBUTION TO CARE
MD Marin:  patient seen and evaluated personally.   I agree with the History & Physical,  Impression & Plan other than what was detailed in my note.  MD Choe  64F w/ pmh CAD s/p multiple stents/brachytherapy, ESRD (on HD M/T/T/Sa), DM1 c/b neuropathy and retinopathy, CHF, mod MR, severe AS, RHF, TIA, PVD, COPD presents w/ generalized weakness and dizzyness. Denies f/c, n/v, diarrhea, ha. has positive leg pain on left side that she states she has had for years. no cp. pt has chronic chf but is not here for sob, has no orthopnea. afebrile vitals stable, non toxic, slight rales on left side .edema on left leg (reported as chronic)denies abd pain but resident appreciated ttp on abd, no abd pain on my exam.   CBC, cmp, urine, ct abd placed by resident    some dilation of biliary duct however pt denying abd pain again, and repeat abd exam has no ttp. can fu w/ gi . pt ambulating w/out difficulty. trop elevated less than chronic level, < 20% drop in repeat.

## 2019-11-24 NOTE — ED ADULT NURSE NOTE - OBJECTIVE STATEMENT
Patient is a 64y female presenting to the ED via EMS from home with c/o weakness. Patient is A&Ox4. Patient reports waking up around 1 am today feeling weak and dizzy. Reports having bilateral lower extremity pain. States having 7/10 left foot pain which she states is chronic but worsened today. Lung sounds clear bilaterally. Abdomen soft, non-distended and non-tender upon palpation. Displays edema of bilateral lower extremities. Denies chest pain, headache, N/V/D, abdominal pain, fever/chills, burning upon urination or difficulty urinating, hematuria. 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 Patient is a 64y female presenting to the ED via EMS from home with c/o weakness. Patient is A&Ox3. Patient is poor historian. Patient reports waking up around 1 am today feeling weak and dizzy. Reports having bilateral lower extremity pain. States having 7/10 left foot pain which she states is chronic but worsened today. Lung sounds clear bilaterally. Abdomen soft, non-distended and non-tender upon palpation. Displays edema of bilateral lower extremities. Denies chest pain, headache, N/V/D, abdominal pain, fever/chills, burning upon urination or difficulty urinating, hematuria. 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

## 2019-11-24 NOTE — ED PROVIDER NOTE - NSFOLLOWUPINSTRUCTIONS_ED_ALL_ED_FT
Follow up with your primary care doctor within the next 3-5 days.     Follow up with a GI DOCTOR for your abnormal CT scan with your dilated bile duct - our GI clinic information is attached.      Please take ACETAMINOPHEN and IBUPROFEN as directed for your pain.

## 2019-11-24 NOTE — ED PROVIDER NOTE - OBJECTIVE STATEMENT
64F w/ pmh CAD s/p multiple stents/brachytherapy, ESRD (on HD M/T/T/Sa), DM1 c/b neuropathy and retinopathy, CHF, mod MR, severe AS, RHF, TIA, PVD, COPD -- p/w weakness, lightheadedness, abd pain since waking up tonight. [place] at baseline yesterday. +mild chronic unchanged sob, bilat LE pain. No fever, n/v/d/c, chest pain, cough, dizziness, dysuria/hematuria.  Does not make urine    DC'd post admission 11/15 for sob, likely copd vs chf exacerbation at that time. Is on dialysis, last session earlier yesterday (Saturday), full session.

## 2019-12-01 ENCOUNTER — INPATIENT (INPATIENT)
Facility: HOSPITAL | Age: 64
LOS: 7 days | Discharge: HOME | DRG: 193 | End: 2019-12-09
Attending: INTERNAL MEDICINE | Admitting: INTERNAL MEDICINE
Payer: MEDICARE

## 2019-12-01 VITALS
WEIGHT: 117.95 LBS | SYSTOLIC BLOOD PRESSURE: 154 MMHG | HEART RATE: 77 BPM | TEMPERATURE: 98 F | RESPIRATION RATE: 18 BRPM | DIASTOLIC BLOOD PRESSURE: 80 MMHG | OXYGEN SATURATION: 100 %

## 2019-12-01 DIAGNOSIS — I50.22 CHRONIC SYSTOLIC (CONGESTIVE) HEART FAILURE: ICD-10-CM

## 2019-12-01 DIAGNOSIS — R06.02 SHORTNESS OF BREATH: ICD-10-CM

## 2019-12-01 DIAGNOSIS — R06.00 DYSPNEA, UNSPECIFIED: ICD-10-CM

## 2019-12-01 DIAGNOSIS — I25.10 ATHEROSCLEROTIC HEART DISEASE OF NATIVE CORONARY ARTERY WITHOUT ANGINA PECTORIS: ICD-10-CM

## 2019-12-01 DIAGNOSIS — D69.6 THROMBOCYTOPENIA, UNSPECIFIED: ICD-10-CM

## 2019-12-01 DIAGNOSIS — Z98.89 OTHER SPECIFIED POSTPROCEDURAL STATES: Chronic | ICD-10-CM

## 2019-12-01 DIAGNOSIS — Z29.9 ENCOUNTER FOR PROPHYLACTIC MEASURES, UNSPECIFIED: ICD-10-CM

## 2019-12-01 DIAGNOSIS — R60.0 LOCALIZED EDEMA: ICD-10-CM

## 2019-12-01 DIAGNOSIS — E10.9 TYPE 1 DIABETES MELLITUS WITHOUT COMPLICATIONS: ICD-10-CM

## 2019-12-01 DIAGNOSIS — N18.6 END STAGE RENAL DISEASE: ICD-10-CM

## 2019-12-01 LAB
ALBUMIN SERPL ELPH-MCNC: 4.6 G/DL — SIGNIFICANT CHANGE UP (ref 3.3–5)
ALP SERPL-CCNC: 132 U/L — HIGH (ref 40–120)
ALT FLD-CCNC: 34 U/L — SIGNIFICANT CHANGE UP (ref 10–45)
ANION GAP SERPL CALC-SCNC: 22 MMOL/L — HIGH (ref 5–17)
ANION GAP SERPL CALC-SCNC: 25 MMOL/L — HIGH (ref 5–17)
AST SERPL-CCNC: 44 U/L — HIGH (ref 10–40)
BASE EXCESS BLDV CALC-SCNC: 4.6 MMOL/L — HIGH (ref -2–2)
BASOPHILS # BLD AUTO: 0.03 K/UL — SIGNIFICANT CHANGE UP (ref 0–0.2)
BASOPHILS NFR BLD AUTO: 0.7 % — SIGNIFICANT CHANGE UP (ref 0–2)
BILIRUB SERPL-MCNC: 0.2 MG/DL — SIGNIFICANT CHANGE UP (ref 0.2–1.2)
BLD GP AB SCN SERPL QL: NEGATIVE — SIGNIFICANT CHANGE UP
BUN SERPL-MCNC: 31 MG/DL — HIGH (ref 7–23)
BUN SERPL-MCNC: 31 MG/DL — HIGH (ref 7–23)
CA-I SERPL-SCNC: 1.14 MMOL/L — SIGNIFICANT CHANGE UP (ref 1.12–1.3)
CALCIUM SERPL-MCNC: 10 MG/DL — SIGNIFICANT CHANGE UP (ref 8.4–10.5)
CALCIUM SERPL-MCNC: 9.8 MG/DL — SIGNIFICANT CHANGE UP (ref 8.4–10.5)
CHLORIDE BLDV-SCNC: 98 MMOL/L — SIGNIFICANT CHANGE UP (ref 96–108)
CHLORIDE SERPL-SCNC: 92 MMOL/L — LOW (ref 96–108)
CHLORIDE SERPL-SCNC: 92 MMOL/L — LOW (ref 96–108)
CO2 BLDV-SCNC: 32 MMOL/L — HIGH (ref 22–30)
CO2 SERPL-SCNC: 21 MMOL/L — LOW (ref 22–31)
CO2 SERPL-SCNC: 26 MMOL/L — SIGNIFICANT CHANGE UP (ref 22–31)
CREAT SERPL-MCNC: 5.26 MG/DL — HIGH (ref 0.5–1.3)
CREAT SERPL-MCNC: 5.44 MG/DL — HIGH (ref 0.5–1.3)
D DIMER BLD IA.RAPID-MCNC: 427 NG/ML DDU — HIGH
EOSINOPHIL # BLD AUTO: 0.08 K/UL — SIGNIFICANT CHANGE UP (ref 0–0.5)
EOSINOPHIL NFR BLD AUTO: 1.8 % — SIGNIFICANT CHANGE UP (ref 0–6)
GAS PNL BLDV: 137 MMOL/L — SIGNIFICANT CHANGE UP (ref 135–145)
GAS PNL BLDV: SIGNIFICANT CHANGE UP
GLUCOSE BLDC GLUCOMTR-MCNC: 181 MG/DL — HIGH (ref 70–99)
GLUCOSE BLDV-MCNC: 134 MG/DL — HIGH (ref 70–99)
GLUCOSE SERPL-MCNC: 142 MG/DL — HIGH (ref 70–99)
GLUCOSE SERPL-MCNC: 187 MG/DL — HIGH (ref 70–99)
HCO3 BLDV-SCNC: 30 MMOL/L — HIGH (ref 21–29)
HCT VFR BLD CALC: 26.8 % — LOW (ref 34.5–45)
HCT VFR BLD CALC: 32.7 % — LOW (ref 34.5–45)
HCT VFR BLDA CALC: 22 % — CRITICAL LOW (ref 39–50)
HGB BLD CALC-MCNC: 7.1 G/DL — LOW (ref 11.5–15.5)
HGB BLD-MCNC: 10.4 G/DL — LOW (ref 11.5–15.5)
HGB BLD-MCNC: 8.5 G/DL — LOW (ref 11.5–15.5)
IMM GRANULOCYTES NFR BLD AUTO: 0.2 % — SIGNIFICANT CHANGE UP (ref 0–1.5)
LACTATE BLDV-MCNC: 1.6 MMOL/L — SIGNIFICANT CHANGE UP (ref 0.7–2)
LYMPHOCYTES # BLD AUTO: 1.03 K/UL — SIGNIFICANT CHANGE UP (ref 1–3.3)
LYMPHOCYTES # BLD AUTO: 22.6 % — SIGNIFICANT CHANGE UP (ref 13–44)
MCHC RBC-ENTMCNC: 31.7 GM/DL — LOW (ref 32–36)
MCHC RBC-ENTMCNC: 31.8 GM/DL — LOW (ref 32–36)
MCHC RBC-ENTMCNC: 33.3 PG — SIGNIFICANT CHANGE UP (ref 27–34)
MCHC RBC-ENTMCNC: 33.3 PG — SIGNIFICANT CHANGE UP (ref 27–34)
MCV RBC AUTO: 104.8 FL — HIGH (ref 80–100)
MCV RBC AUTO: 105.1 FL — HIGH (ref 80–100)
MONOCYTES # BLD AUTO: 0.54 K/UL — SIGNIFICANT CHANGE UP (ref 0–0.9)
MONOCYTES NFR BLD AUTO: 11.9 % — SIGNIFICANT CHANGE UP (ref 2–14)
NEUTROPHILS # BLD AUTO: 2.86 K/UL — SIGNIFICANT CHANGE UP (ref 1.8–7.4)
NEUTROPHILS NFR BLD AUTO: 62.8 % — SIGNIFICANT CHANGE UP (ref 43–77)
NRBC # BLD: 0 /100 WBCS — SIGNIFICANT CHANGE UP (ref 0–0)
NRBC # BLD: 0 /100 WBCS — SIGNIFICANT CHANGE UP (ref 0–0)
NT-PROBNP SERPL-SCNC: HIGH PG/ML (ref 0–300)
PCO2 BLDV: 58 MMHG — HIGH (ref 35–50)
PH BLDV: 7.34 — LOW (ref 7.35–7.45)
PLATELET # BLD AUTO: 205 K/UL — SIGNIFICANT CHANGE UP (ref 150–400)
PLATELET # BLD AUTO: 67 K/UL — LOW (ref 150–400)
PO2 BLDV: 22 MMHG — LOW (ref 25–45)
POTASSIUM BLDV-SCNC: 5.1 MMOL/L — SIGNIFICANT CHANGE UP (ref 3.5–5.3)
POTASSIUM SERPL-MCNC: 5.4 MMOL/L — HIGH (ref 3.5–5.3)
POTASSIUM SERPL-MCNC: 6.6 MMOL/L — CRITICAL HIGH (ref 3.5–5.3)
POTASSIUM SERPL-SCNC: 5.4 MMOL/L — HIGH (ref 3.5–5.3)
POTASSIUM SERPL-SCNC: 6.6 MMOL/L — CRITICAL HIGH (ref 3.5–5.3)
PROT SERPL-MCNC: 8.1 G/DL — SIGNIFICANT CHANGE UP (ref 6–8.3)
RBC # BLD: 2.55 M/UL — LOW (ref 3.8–5.2)
RBC # BLD: 3.12 M/UL — LOW (ref 3.8–5.2)
RBC # FLD: 14 % — SIGNIFICANT CHANGE UP (ref 10.3–14.5)
RBC # FLD: 14.2 % — SIGNIFICANT CHANGE UP (ref 10.3–14.5)
RH IG SCN BLD-IMP: POSITIVE — SIGNIFICANT CHANGE UP
SAO2 % BLDV: 28 % — LOW (ref 67–88)
SODIUM SERPL-SCNC: 138 MMOL/L — SIGNIFICANT CHANGE UP (ref 135–145)
SODIUM SERPL-SCNC: 140 MMOL/L — SIGNIFICANT CHANGE UP (ref 135–145)
WBC # BLD: 4.55 K/UL — SIGNIFICANT CHANGE UP (ref 3.8–10.5)
WBC # BLD: 4.73 K/UL — SIGNIFICANT CHANGE UP (ref 3.8–10.5)
WBC # FLD AUTO: 4.55 K/UL — SIGNIFICANT CHANGE UP (ref 3.8–10.5)
WBC # FLD AUTO: 4.73 K/UL — SIGNIFICANT CHANGE UP (ref 3.8–10.5)

## 2019-12-01 PROCEDURE — 99285 EMERGENCY DEPT VISIT HI MDM: CPT | Mod: GC

## 2019-12-01 PROCEDURE — 93010 ELECTROCARDIOGRAM REPORT: CPT | Mod: GC

## 2019-12-01 PROCEDURE — 93971 EXTREMITY STUDY: CPT | Mod: 26

## 2019-12-01 PROCEDURE — 99223 1ST HOSP IP/OBS HIGH 75: CPT

## 2019-12-01 PROCEDURE — 78582 LUNG VENTILAT&PERFUS IMAGING: CPT | Mod: 26

## 2019-12-01 PROCEDURE — 71046 X-RAY EXAM CHEST 2 VIEWS: CPT | Mod: 26

## 2019-12-01 RX ORDER — LISINOPRIL 2.5 MG/1
40 TABLET ORAL DAILY
Refills: 0 | Status: DISCONTINUED | OUTPATIENT
Start: 2019-12-01 | End: 2019-12-09

## 2019-12-01 RX ORDER — BUDESONIDE, MICRONIZED 100 %
0.5 POWDER (GRAM) MISCELLANEOUS EVERY 12 HOURS
Refills: 0 | Status: DISCONTINUED | OUTPATIENT
Start: 2019-12-01 | End: 2019-12-09

## 2019-12-01 RX ORDER — INSULIN LISPRO 100/ML
VIAL (ML) SUBCUTANEOUS
Refills: 0 | Status: DISCONTINUED | OUTPATIENT
Start: 2019-12-01 | End: 2019-12-06

## 2019-12-01 RX ORDER — PANTOPRAZOLE SODIUM 20 MG/1
40 TABLET, DELAYED RELEASE ORAL
Refills: 0 | Status: DISCONTINUED | OUTPATIENT
Start: 2019-12-01 | End: 2019-12-09

## 2019-12-01 RX ORDER — PIPERACILLIN AND TAZOBACTAM 4; .5 G/20ML; G/20ML
2.25 INJECTION, POWDER, LYOPHILIZED, FOR SOLUTION INTRAVENOUS EVERY 8 HOURS
Refills: 0 | Status: DISCONTINUED | OUTPATIENT
Start: 2019-12-01 | End: 2019-12-01

## 2019-12-01 RX ORDER — ATORVASTATIN CALCIUM 80 MG/1
20 TABLET, FILM COATED ORAL AT BEDTIME
Refills: 0 | Status: DISCONTINUED | OUTPATIENT
Start: 2019-12-01 | End: 2019-12-09

## 2019-12-01 RX ORDER — HYDRALAZINE HCL 50 MG
50 TABLET ORAL DAILY
Refills: 0 | Status: DISCONTINUED | OUTPATIENT
Start: 2019-12-01 | End: 2019-12-09

## 2019-12-01 RX ORDER — OXYCODONE AND ACETAMINOPHEN 5; 325 MG/1; MG/1
1 TABLET ORAL ONCE
Refills: 0 | Status: DISCONTINUED | OUTPATIENT
Start: 2019-12-01 | End: 2019-12-01

## 2019-12-01 RX ORDER — PIPERACILLIN AND TAZOBACTAM 4; .5 G/20ML; G/20ML
3.38 INJECTION, POWDER, LYOPHILIZED, FOR SOLUTION INTRAVENOUS EVERY 8 HOURS
Refills: 0 | Status: DISCONTINUED | OUTPATIENT
Start: 2019-12-01 | End: 2019-12-01

## 2019-12-01 RX ORDER — PIPERACILLIN AND TAZOBACTAM 4; .5 G/20ML; G/20ML
3.38 INJECTION, POWDER, LYOPHILIZED, FOR SOLUTION INTRAVENOUS EVERY 12 HOURS
Refills: 0 | Status: DISCONTINUED | OUTPATIENT
Start: 2019-12-01 | End: 2019-12-05

## 2019-12-01 RX ORDER — ACETAMINOPHEN 500 MG
650 TABLET ORAL EVERY 6 HOURS
Refills: 0 | Status: DISCONTINUED | OUTPATIENT
Start: 2019-12-01 | End: 2019-12-09

## 2019-12-01 RX ORDER — INSULIN LISPRO 100/ML
VIAL (ML) SUBCUTANEOUS AT BEDTIME
Refills: 0 | Status: DISCONTINUED | OUTPATIENT
Start: 2019-12-01 | End: 2019-12-09

## 2019-12-01 RX ORDER — ATORVASTATIN CALCIUM 80 MG/1
40 TABLET, FILM COATED ORAL AT BEDTIME
Refills: 0 | Status: DISCONTINUED | OUTPATIENT
Start: 2019-12-01 | End: 2019-12-01

## 2019-12-01 RX ORDER — SEVELAMER CARBONATE 2400 MG/1
800 POWDER, FOR SUSPENSION ORAL
Refills: 0 | Status: DISCONTINUED | OUTPATIENT
Start: 2019-12-01 | End: 2019-12-09

## 2019-12-01 RX ORDER — PIPERACILLIN AND TAZOBACTAM 4; .5 G/20ML; G/20ML
2.25 INJECTION, POWDER, LYOPHILIZED, FOR SOLUTION INTRAVENOUS ONCE
Refills: 0 | Status: DISCONTINUED | OUTPATIENT
Start: 2019-12-01 | End: 2019-12-01

## 2019-12-01 RX ORDER — METOPROLOL TARTRATE 50 MG
50 TABLET ORAL DAILY
Refills: 0 | Status: DISCONTINUED | OUTPATIENT
Start: 2019-12-01 | End: 2019-12-09

## 2019-12-01 RX ORDER — SODIUM CHLORIDE 9 MG/ML
1000 INJECTION, SOLUTION INTRAVENOUS
Refills: 0 | Status: DISCONTINUED | OUTPATIENT
Start: 2019-12-01 | End: 2019-12-09

## 2019-12-01 RX ORDER — DEXTROSE 50 % IN WATER 50 %
25 SYRINGE (ML) INTRAVENOUS ONCE
Refills: 0 | Status: DISCONTINUED | OUTPATIENT
Start: 2019-12-01 | End: 2019-12-09

## 2019-12-01 RX ORDER — INSULIN LISPRO 100/ML
3 VIAL (ML) SUBCUTANEOUS
Refills: 0 | Status: DISCONTINUED | OUTPATIENT
Start: 2019-12-01 | End: 2019-12-04

## 2019-12-01 RX ORDER — HYDRALAZINE HCL 50 MG
100 TABLET ORAL
Refills: 0 | Status: DISCONTINUED | OUTPATIENT
Start: 2019-12-01 | End: 2019-12-09

## 2019-12-01 RX ORDER — INSULIN GLARGINE 100 [IU]/ML
8 INJECTION, SOLUTION SUBCUTANEOUS AT BEDTIME
Refills: 0 | Status: DISCONTINUED | OUTPATIENT
Start: 2019-12-01 | End: 2019-12-04

## 2019-12-01 RX ORDER — GLUCAGON INJECTION, SOLUTION 0.5 MG/.1ML
1 INJECTION, SOLUTION SUBCUTANEOUS ONCE
Refills: 0 | Status: DISCONTINUED | OUTPATIENT
Start: 2019-12-01 | End: 2019-12-09

## 2019-12-01 RX ORDER — DEXTROSE 50 % IN WATER 50 %
15 SYRINGE (ML) INTRAVENOUS ONCE
Refills: 0 | Status: DISCONTINUED | OUTPATIENT
Start: 2019-12-01 | End: 2019-12-09

## 2019-12-01 RX ORDER — VANCOMYCIN HCL 1 G
1000 VIAL (EA) INTRAVENOUS ONCE
Refills: 0 | Status: COMPLETED | OUTPATIENT
Start: 2019-12-01 | End: 2019-12-01

## 2019-12-01 RX ORDER — PIPERACILLIN AND TAZOBACTAM 4; .5 G/20ML; G/20ML
3.38 INJECTION, POWDER, LYOPHILIZED, FOR SOLUTION INTRAVENOUS ONCE
Refills: 0 | Status: COMPLETED | OUTPATIENT
Start: 2019-12-01 | End: 2019-12-01

## 2019-12-01 RX ORDER — ASPIRIN/CALCIUM CARB/MAGNESIUM 324 MG
81 TABLET ORAL DAILY
Refills: 0 | Status: DISCONTINUED | OUTPATIENT
Start: 2019-12-01 | End: 2019-12-09

## 2019-12-01 RX ORDER — DEXTROSE 50 % IN WATER 50 %
12.5 SYRINGE (ML) INTRAVENOUS ONCE
Refills: 0 | Status: DISCONTINUED | OUTPATIENT
Start: 2019-12-01 | End: 2019-12-09

## 2019-12-01 RX ORDER — OXYCODONE AND ACETAMINOPHEN 5; 325 MG/1; MG/1
1 TABLET ORAL EVERY 12 HOURS
Refills: 0 | Status: DISCONTINUED | OUTPATIENT
Start: 2019-12-01 | End: 2019-12-05

## 2019-12-01 RX ORDER — INSULIN GLARGINE 100 [IU]/ML
12 INJECTION, SOLUTION SUBCUTANEOUS
Qty: 0 | Refills: 0 | DISCHARGE

## 2019-12-01 RX ORDER — PIPERACILLIN AND TAZOBACTAM 4; .5 G/20ML; G/20ML
3.38 INJECTION, POWDER, LYOPHILIZED, FOR SOLUTION INTRAVENOUS ONCE
Refills: 0 | Status: DISCONTINUED | OUTPATIENT
Start: 2019-12-01 | End: 2019-12-01

## 2019-12-01 RX ORDER — CLOPIDOGREL BISULFATE 75 MG/1
75 TABLET, FILM COATED ORAL DAILY
Refills: 0 | Status: DISCONTINUED | OUTPATIENT
Start: 2019-12-01 | End: 2019-12-09

## 2019-12-01 RX ADMIN — OXYCODONE AND ACETAMINOPHEN 1 TABLET(S): 5; 325 TABLET ORAL at 21:30

## 2019-12-01 RX ADMIN — ATORVASTATIN CALCIUM 40 MILLIGRAM(S): 80 TABLET, FILM COATED ORAL at 22:00

## 2019-12-01 RX ADMIN — CLOPIDOGREL BISULFATE 75 MILLIGRAM(S): 75 TABLET, FILM COATED ORAL at 21:30

## 2019-12-01 RX ADMIN — Medication 250 MILLIGRAM(S): at 23:54

## 2019-12-01 RX ADMIN — OXYCODONE AND ACETAMINOPHEN 1 TABLET(S): 5; 325 TABLET ORAL at 23:56

## 2019-12-01 RX ADMIN — INSULIN GLARGINE 8 UNIT(S): 100 INJECTION, SOLUTION SUBCUTANEOUS at 21:32

## 2019-12-01 RX ADMIN — Medication 50 MILLIGRAM(S): at 21:30

## 2019-12-01 RX ADMIN — PIPERACILLIN AND TAZOBACTAM 200 GRAM(S): 4; .5 INJECTION, POWDER, LYOPHILIZED, FOR SOLUTION INTRAVENOUS at 21:30

## 2019-12-01 NOTE — H&P ADULT - PROBLEM SELECTOR PLAN 6
Troponin elevated likely in setting of ESRD. Similar level to prior labs  EKG unchanged  Continue Hydralazine  Per patient no longer takes Isosorbide Dinitrate (Primary team to confirm with outpatient providers)

## 2019-12-01 NOTE — H&P ADULT - ASSESSMENT
63 yo woman with PMH of CAD, DM1 c/b neuropathy and retinopathy, CHF (EF 30%), mod MR, severe AS, RHF, TIA, severe PVD s/p b/l fempop bypass c/b left thrombosed graft, left subclavian vein stenosis s/p stent, COPD not on O2, gout, hilar lymphadenopathy of unknown etiology, frequent hospitalizations (usually 1-3 times a month) p/w SOB, cough, LLE pain found with possible RLL PNA and LLE cellulitis

## 2019-12-01 NOTE — ED ADULT NURSE REASSESSMENT NOTE - NS ED NURSE REASSESS COMMENT FT1
Report received from KRISTINE Barrett. Pt is breathing unlabored on 2 L NC. VSS. Educated pt on plan of care. Safety and comfort maintained. Warm blanket provided. Pt admitted to tele, awaiting a bed. Call bell within reach. ID band in place. Report received from KRISTINE Barrett. Pt is breathing unlabored on 2 L NC. VSS. Educated pt on plan of care. Safety and comfort maintained. Warm blanket provided. Pt admitted to tele, awaiting a bed. Pt NSR on cardiac monitor, HR in 80's. Call bell within reach. ID band in place.

## 2019-12-01 NOTE — ED ADULT NURSE NOTE - NSIMPLEMENTINTERV_GEN_ALL_ED
Implemented All Fall with Harm Risk Interventions:  [place] to call system. Call bell, personal items and telephone within reach. Instruct patient to call for assistance. Room bathroom lighting operational. Non-slip footwear when patient is off stretcher. Physically safe environment: no spills, clutter or unnecessary equipment. Stretcher in lowest position, wheels locked, appropriate side rails in place. Provide visual cue, wrist band, yellow gown, etc. Monitor gait and stability. Monitor for mental status changes and reorient to person, place, and time. Review medications for side effects contributing to fall risk. Reinforce activity limits and safety measures with patient and family. Provide visual clues: red socks.

## 2019-12-01 NOTE — H&P ADULT - NSHPREVIEWOFSYSTEMS_GEN_ALL_CORE
REVIEW OF SYSTEMS:  CONSTITUTIONAL: No fever, chills or sweats  EYES: No eye pain, visual disturbances, or discharge  ENMT: No sinus congestion or throat pain  RESPIRATORY: See HPI  CARDIOVASCULAR: No chest pain, palpitations, or dizziness  GASTROINTESTINAL: No abdominal pain. No nausea, vomiting, diarrhea or constipation. No melena or hematochezia.  GENITOURINARY: Anuric. No flank pain  NEUROLOGICAL: No headaches, memory loss, loss of strength, numbness, or tremors  SKIN: No rashes or lesions   LYMPH NODES: No enlarged glands  ENDOCRINE: No heat or cold intolerance; No hair loss  MUSCULOSKELETAL: No joint pain or swelling; No muscle, back pain. See HPI  HEME/LYMPH: No easy bruising, or bleeding gums  ALLERGY AND IMMUNOLOGIC: No reaction to medications

## 2019-12-01 NOTE — ED ADULT NURSE NOTE - OBJECTIVE STATEMENT
65 y/o female with pmhx of ESRD on HD via L AV shunt q m/tu/th/sa, dm, htn biba with c/o increased sob and LLE edema.  pt denies any cp at this time.  pt is awake, alert and responsive to all stimuli.  no respiratory distress noted.  no cyanosis or pallor noted at this time.  vss. recvd pt on 2L O2 via n/c but pt sagurating safety precautions in place.  will continue to monitor. 65 y/o female with pmhx of ESRD on HD via L AV shunt q m/tu/th/sa, dm, htn biba with c/o increased sob and LLE edema.  pt denies any cp at this time.  pt is awake, alert and responsive to all stimuli.  no respiratory distress noted.  no cyanosis or pallor noted at this time. skin is warm but dry; tenting noted.  per pt, she is only allowed 1L of po fluid daily. vss. recvd pt on 2L O2 via n/c but pt sagurating safety precautions in place.  will continue to monitor.

## 2019-12-01 NOTE — ED PROVIDER NOTE - CLINICAL SUMMARY MEDICAL DECISION MAKING FREE TEXT BOX
65 y/o Female w/ a pmh significant for  CAD (Cath Feb '19 showing 99% RCA, 100% RPLS, 100% Cx) s/p multiple stents, ESRD (M/T/T/Sa), DM1 c/b neuropathy and retinopathy, HFrEF  (EF 30% per last [facility]), severe AS, RHF, TIA, PVD s/p b/l fempop bypass c/b left thrombosed graft, left subclavian vein stenosis s/p stent, COPD, gout BIBEMS for SOB c/f CHF vs. COPD vs. Infectious etiology. Labs, EKG, CXR likely admit 63 y/o Female w/ a pmh significant for  CAD (Cath Feb '19 showing 99% RCA, 100% RPLS, 100% Cx) s/p multiple stents, ESRD (M/T/T/Sa), DM1 c/b neuropathy and retinopathy, HFrEF  (EF 30% per last [facility]), severe AS, RHF, TIA, PVD s/p b/l fempop bypass c/b left thrombosed graft, left subclavian vein stenosis s/p stent, COPD, gout BIBEMS for SOB w/ LLE swelling. Labs, EKG, CXR, r/o DVT.

## 2019-12-01 NOTE — ED PROVIDER NOTE - NS ED ROS FT
REVIEW OF SYSTEMS:    CONSTITUTIONAL: Denies any fatigue, weakness, fevers or chills  EYES/ENT: No visual changes;  No vertigo or throat pain   NECK: No pain or stiffness  RESPIRATORY: + shortness of breath  CARDIOVASCULAR: + lower extremity swelling  GASTROINTESTINAL: No abdominal or epigastric pain. No nausea, vomiting, or hematemesis; No diarrhea or constipation. No melena or hematochezia.  GENITOURINARY: No dysuria, frequency or hematuria  NEUROLOGICAL: No numbness or weakness  SKIN: No itching, burning, rashes, or lesions   All other review of systems is negative unless indicated above.

## 2019-12-01 NOTE — H&P ADULT - HISTORY OF PRESENT ILLNESS
63 yo woman with CAD (Cath Feb '19 showing 99% RCA, 100% RPLS, 100% Cx) s/p multiple stents/brachytherapy, ESRD (on HD M/T/T/Sa), DM1 c/b neuropathy and retinopathy, CHF (EF 30% per last [facility]), mod MR, severe AS, RHF, TIA, severe PVD s/p b/l fempop bypass c/b left thrombosed graft, left subclavian vein stenosis s/p stent, COPD not on O2, gout, hilar lymphadenopathy of unknown etiology, frequent hospitalizations (usually 1-3 times a month) presenting with  SOB of 1 day. Patient states that she was walking in her home feeling short of breath. Denies wheezing. Denies associated pleuritic CP or chest pain. Denies palpitations. Endorses a productive cough that worsened with yellow sputum. States that she has been experiencing cough ~1 month. Denies rhinorrhea, nasal congestion, sore throat, fevers, chills, sweats. Denies sick contacts. Denies recent use of antibiotics. Patient also endorses increased left LE edema (chronically edematous from fem-pop bypass) today with worsening pain. States yesterday LE edema was at baseline. Denies trauma. Denies orthopnea or PND. Does not utilize diuretics. Last HD session was Saturday.

## 2019-12-01 NOTE — H&P ADULT - NSHPPHYSICALEXAM_GEN_ALL_CORE
Vital Signs Last 24 Hrs  T(C): 37.1 (01 Dec 2019 18:57), Max: 37.1 (01 Dec 2019 18:57)  T(F): 98.7 (01 Dec 2019 18:57), Max: 98.7 (01 Dec 2019 18:57)  HR: 82 (01 Dec 2019 18:57) (77 - 82)  BP: 137/71 (01 Dec 2019 18:57) (137/71 - 154/80)  BP(mean): 91 (01 Dec 2019 18:57) (91 - 91)  RR: 20 (01 Dec 2019 18:57) (18 - 20)  SpO2: 99% (01 Dec 2019 18:57) (99% - 100%)    TELE Sinus      PHYSICAL EXAM:  GENERAL: NAD, well-groomed, well-developed  HEAD:  Atraumatic, Normocephalic  EYES: EOMI, PERRLA, conjunctiva and sclera clear  ENMT: No tonsillar erythema. Moist mucous membranes  NECK: Supple, No JVD  NERVOUS SYSTEM:  Alert & Oriented X3, Good concentration; Motor Strength 5/5 B/L upper and lower extremities  CHEST/LUNG: Good air movement bilaterally. Decrease breath sounds in Right lung base. No rales no wheezing.  HEART: Regular rate and rhythm +S1 S2. + Systolic murmur. No rub, No distant heart sounds.  ABDOMEN: Soft, Nontender, Nondistended; Bowel sounds present  EXTREMITIES:  2+ radial Pulses. Palpable DP pulses. No clubbing, or cyanosis.   LYMPH: No lymphadenopathy noted  SKIN: Overall: Warm and Dry. LLE with pink erythema. No warmth. No rashes or lesions

## 2019-12-01 NOTE — H&P ADULT - PROBLEM SELECTOR PLAN 3
Thrombocytopenia on labs. Unclear if spurious result or true.  Repeat CBC with T&S  Primary day team to Consider Heme consult if persistent

## 2019-12-01 NOTE — H&P ADULT - ATTENDING COMMENTS
Dr. Pierce Viera accepted patient's case from the ED and requested in house hospitalist team to complete admission. Patient was previously unknown to me. Patient was assigned to me by hospitalist in charge. My involvement in this case consisted only of the initial history, physical and management plan. Dr. Viera to assume care in AM and thereafter. Case discussed in detail with overnight medicine NP/KALI Rey [phone]

## 2019-12-01 NOTE — ED ADULT NURSE NOTE - DRUG PRE-SCREENING (DAST -1)
TRANSFER - OUT REPORT:    Verbal report given to Nestor Shine RN(name) on Male Adal Nicole  being transferred to MIU(unit) for routine progression of care       Report consisted of patients Situation, Background, Assessment and   Recommendations(SBAR). Information from the following report(s) SBAR, Procedure Summary, Intake/Output, MAR and Recent Results was reviewed with the receiving nurse. Opportunity for questions and clarification was provided.       Patient transported with:   Registered Nurse Statement Selected

## 2019-12-01 NOTE — H&P ADULT - PROBLEM SELECTOR PLAN 1
At time of assessment, states that she feels improved given she is receiving supplemental O2.   Symptoms may be multifactorial in setting of pre-existing COPD, HF, valvular disease.   Low suspicion for COPD exacerbation: will continue home meds  Patient does not appear hypervolemic, Lower BNP than previous values:  low suspicion for CHF exacerbation  ED team checked D-Dimer: Elevated. Anticipated to undergo V/Q scan  - F/U read  CXR with reported new Right lower lung opacity: possible PNA.  -Vanc and Zosyn initiated. Check Vanc by level. Renally dose Zosyn  -Check ambulatory saturation off supplemental O2.

## 2019-12-01 NOTE — H&P ADULT - PROBLEM SELECTOR PLAN 2
LE doppler negative for DVT  Increased erythema and edema, mildly warmer than last. Possible cellulitis  On Vanc and Zosyn  Monitor  Consider Vascular consult if no improvement  ISTOP Reference: # [id number] Last prescribed Percocet 5/325 in October 2019  Percocet prn for pain O/N

## 2019-12-01 NOTE — ED PROVIDER NOTE - OBJECTIVE STATEMENT
65 y/o Female w/ a pmh significant for  CAD (Cath Feb '19 showing 99% RCA, 100% RPLS, 100% Cx) s/p multiple stents, ESRD (M/T/T/Sa), DM1 c/b neuropathy and retinopathy, HFrEF  (EF 30% per last [facility]), severe AS, RHF, TIA, PVD s/p b/l fempop bypass c/b left thrombosed graft, left subclavian vein stenosis s/p stent, COPD, gout BIBEMS for SOB. Pt reports she became acutely SOB this AM as she was walking around her home. She says she does not have Oxygen but felt like she was gasping for air and 'needed to breathe'. Her daughter saw her and felt she looked unwell so she called EMS. No reported fevers, sick contacts, chills, n/v, CP, palpitations, abdominal pain, diarrhea, dysuria or melena.

## 2019-12-01 NOTE — ED PROVIDER NOTE - PHYSICAL EXAMINATION
PHYSICAL EXAM:    General: No acute distress.  HEENT: Normocephalic, atraumatic.  PERRL.  EOMI.  No scleral icterus  Heart: RRR.  Normal S1 and S2.  No murmurs, rubs, or gallops.   Lungs: Poor inspiratory effort, Coarse breath sounds b/l lung fields.    Abdomen: BS+, soft, NT/ND.  No organomegaly.  Skin: Warm and dry.  No rashes.  Extremities: 3+ pitting edema LLE, 1+ pitting edema RLE  Musculoskeletal: No deformities.  No spinal or paraspinal tenderness.  Neuro: A&Ox3.  CN II-XII intact.  5/5 strength in UE and LE b/l. PHYSICAL EXAM:    General: No acute distress.  HEENT: Normocephalic, atraumatic.  PERRL.  EOMI.    Heart: RRR.  Normal S1 and S2.  No murmurs, rubs, or gallops.   Lungs: Poor inspiratory effort.    Abdomen: BS+, soft, NT/ND.  No organomegaly.  Skin: Warm and dry.  No rashes.  Extremities: Tense LLE 2+ pitting edema   Neuro: A&Ox3.  CN II-XII intact.  5/5 strength in UE and LE b/l.

## 2019-12-01 NOTE — H&P ADULT - PROBLEM SELECTOR PLAN 7
Compliant with medications  Continue with Humalog 3U TID premeal  Continue with corrective SSI  Continue with Lantus (will start with 8U tonight) as patient with latest glucose of 134

## 2019-12-01 NOTE — H&P ADULT - NSHPLABSRESULTS_GEN_ALL_CORE
Personally reviewed labs: Notable for thrombocytopenia    Personally reviewed EKG: SR 79 bpm QTc 481 unchanged morphology compared to 11/29/2019 EKG      Personally reviewed imaging    < from: Xray Chest 2 Views PA/Lat (12.01.19 @ 17:35) >    ******PRELIMINARY REPORT******          INTERPRETATION:  New right lower lung opacity, likely pneumonia.    < end of copied text >    < from: VA Duplex Lower Ext Vein Scan, Left (12.01.19 @ 17:11) >    IMPRESSION:     No evidence of left lower extremity deep venous thrombosis.    < end of copied text >

## 2019-12-02 LAB
GLUCOSE BLDC GLUCOMTR-MCNC: 127 MG/DL — HIGH (ref 70–99)
GLUCOSE BLDC GLUCOMTR-MCNC: 164 MG/DL — HIGH (ref 70–99)
GLUCOSE BLDC GLUCOMTR-MCNC: 175 MG/DL — HIGH (ref 70–99)
GLUCOSE BLDC GLUCOMTR-MCNC: 207 MG/DL — HIGH (ref 70–99)
GLUCOSE BLDC GLUCOMTR-MCNC: 86 MG/DL — SIGNIFICANT CHANGE UP (ref 70–99)
MAGNESIUM SERPL-MCNC: 2.5 MG/DL — SIGNIFICANT CHANGE UP (ref 1.6–2.6)
PHOSPHATE SERPL-MCNC: 7.3 MG/DL — HIGH (ref 2.5–4.5)
VANCOMYCIN FLD-MCNC: 17 UG/ML — SIGNIFICANT CHANGE UP

## 2019-12-02 PROCEDURE — 93010 ELECTROCARDIOGRAM REPORT: CPT

## 2019-12-02 PROCEDURE — 99223 1ST HOSP IP/OBS HIGH 75: CPT

## 2019-12-02 RX ADMIN — Medication 3 UNIT(S): at 17:47

## 2019-12-02 RX ADMIN — OXYCODONE AND ACETAMINOPHEN 1 TABLET(S): 5; 325 TABLET ORAL at 05:42

## 2019-12-02 RX ADMIN — SEVELAMER CARBONATE 800 MILLIGRAM(S): 2400 POWDER, FOR SUSPENSION ORAL at 17:38

## 2019-12-02 RX ADMIN — PIPERACILLIN AND TAZOBACTAM 25 GRAM(S): 4; .5 INJECTION, POWDER, LYOPHILIZED, FOR SOLUTION INTRAVENOUS at 23:15

## 2019-12-02 RX ADMIN — SEVELAMER CARBONATE 800 MILLIGRAM(S): 2400 POWDER, FOR SUSPENSION ORAL at 08:37

## 2019-12-02 RX ADMIN — PANTOPRAZOLE SODIUM 40 MILLIGRAM(S): 20 TABLET, DELAYED RELEASE ORAL at 05:31

## 2019-12-02 RX ADMIN — PIPERACILLIN AND TAZOBACTAM 25 GRAM(S): 4; .5 INJECTION, POWDER, LYOPHILIZED, FOR SOLUTION INTRAVENOUS at 05:29

## 2019-12-02 RX ADMIN — OXYCODONE AND ACETAMINOPHEN 1 TABLET(S): 5; 325 TABLET ORAL at 06:55

## 2019-12-02 RX ADMIN — Medication 3 UNIT(S): at 08:37

## 2019-12-02 RX ADMIN — OXYCODONE AND ACETAMINOPHEN 1 TABLET(S): 5; 325 TABLET ORAL at 17:52

## 2019-12-02 RX ADMIN — INSULIN GLARGINE 8 UNIT(S): 100 INJECTION, SOLUTION SUBCUTANEOUS at 23:25

## 2019-12-02 RX ADMIN — Medication 1: at 08:37

## 2019-12-02 RX ADMIN — SEVELAMER CARBONATE 800 MILLIGRAM(S): 2400 POWDER, FOR SUSPENSION ORAL at 12:43

## 2019-12-02 RX ADMIN — Medication 0.5 MILLIGRAM(S): at 05:29

## 2019-12-02 RX ADMIN — Medication 3 UNIT(S): at 12:44

## 2019-12-02 RX ADMIN — Medication 50 MILLIGRAM(S): at 05:30

## 2019-12-02 RX ADMIN — CLOPIDOGREL BISULFATE 75 MILLIGRAM(S): 75 TABLET, FILM COATED ORAL at 11:25

## 2019-12-02 RX ADMIN — Medication 650 MILLIGRAM(S): at 15:20

## 2019-12-02 RX ADMIN — Medication 2: at 12:43

## 2019-12-02 RX ADMIN — Medication 650 MILLIGRAM(S): at 14:28

## 2019-12-02 RX ADMIN — Medication 0.5 MILLIGRAM(S): at 17:38

## 2019-12-02 RX ADMIN — Medication 81 MILLIGRAM(S): at 11:25

## 2019-12-02 RX ADMIN — ATORVASTATIN CALCIUM 20 MILLIGRAM(S): 80 TABLET, FILM COATED ORAL at 23:15

## 2019-12-02 RX ADMIN — OXYCODONE AND ACETAMINOPHEN 1 TABLET(S): 5; 325 TABLET ORAL at 18:50

## 2019-12-02 RX ADMIN — LISINOPRIL 40 MILLIGRAM(S): 2.5 TABLET ORAL at 05:32

## 2019-12-02 RX ADMIN — Medication 100 MILLIGRAM(S): at 23:15

## 2019-12-02 NOTE — CONSULT NOTE ADULT - ASSESSMENT
65 yo woman with CAD (Cath Feb '19 showing 99% RCA, 100% RPLS, 100% Cx) s/p multiple stents/brachytherapy, ESRD (on HD M/T/T/Sa), DM1 c/b neuropathy and retinopathy, CHF (EF 30% per last [facility]), mod MR, severe AS, RHF, TIA, severe PVD s/p b/l fempop bypass c/b left thrombosed graft, left subclavian vein stenosis s/p stent, COPD not on O2, gout, hilar lymphadenopathy of unknown etiology, frequent hospitalizations (usually 1-3 times a month) presented with  SOB of 1 day. had cxr revealing pneumonia.     1- ESRD  2- HTN   3- SHPT  4- Pneumonia    hd consent obtained witnessed and placed in chart  hd today   continue with hydralazine and ace-I  renvela 3 tab with meals  zosyn 3.3.75 g iv q12  nebs

## 2019-12-02 NOTE — PROVIDER CONTACT NOTE (OTHER) - ACTION/TREATMENT ORDERED:
NP aware & assessed patient & reviewed patient's orders, labs, history & plan. NP ordered 12-Lead ECG & reviewed 12-Lead ECG. NP consulted with Cardiology.

## 2019-12-02 NOTE — PROVIDER CONTACT NOTE (OTHER) - BACKGROUND
Patient admitted for Dyspnea, Diabetes Mellitus Type 1, Coronary Artery Disease, End Stage Renal Disease, Heart Failure, Lower Extremity Edema.

## 2019-12-02 NOTE — PROVIDER CONTACT NOTE (OTHER) - ASSESSMENT
Patient is asymptomatic. Patient is Alert and Oriented times Four. Patient denies chest pain, discomfort, palpitations, shortness of breath, dizziness and lightheadedness. Patient's Heart Rate is 70's - 90's on the cardiac monitor. Patient's Vital Signs: Temperature 97.6 F Oral, Heart Rate 70, Blood Pressure 133/69, Respiratory Rate 18 and O2 Saturation is 97% on O2 2 Liters Nasal Cannula.

## 2019-12-02 NOTE — CHART NOTE - NSCHARTNOTEFT_GEN_A_CORE
NATHAN MEEKS    Notified by RN patient with Acceleration junction overnight   patient had one episode Heart rate 90's   Acceleration junction back to normal sinus rhythm 70's   EKG done with normal sinus rhythm 70's   Patient asymptomatic                        8.5    4.73  )-----------( 205      ( 01 Dec 2019 22:13 )             26.8   12-01    140  |  92<L>  |  31<H>  ----------------------------<  142<H>  5.4<H>   |  26  |  5.44<H>    Ca    10.0      01 Dec 2019 18:16  Phos  7.3     12-02  Mg     2.5     12-02    TPro  8.1  /  Alb  4.6  /  TBili  0.2  /  DBili  x   /  AST  44<H>  /  ALT  34  /  AlkPhos  132<H>  12-01  HPI:  65 yo woman with CAD (Cath Feb '19 showing 99% RCA, 100% RPLS, 100% Cx) s/p multiple stents/brachytherapy, ESRD (on HD M/T/T/Sa), DM1 c/b neuropathy and retinopathy, CHF (EF 30% per last [facility]), mod MR, severe AS, RHF, TIA, severe PVD s/p b/l fempop bypass c/b left thrombosed graft, left subclavian vein stenosis s/p stent, COPD not on O2, gout, hilar lymphadenopathy of unknown etiology, frequent hospitalizations (usually 1-3 times a month) presenting with  SOB of 1 day. Patient states that she was walking in her home feeling short of breath. Denies wheezing. Denies associated pleuritic CP or chest pain. Denies palpitations. Endorses a productive cough that worsened with yellow sputum. States that she has been experiencing cough ~1 month. Denies rhinorrhea, nasal congestion, sore throat, fevers, chills, sweats. Patient also endorses increased left LE edema (chronically edematous from fem-pop bypass) today with worsening pain. States yesterday LE edema was at baseline.   # Acceralaration junction /Normal Sinus patient with history of NSVT /PAT  Interventions taken   Monitor telemetry  continue with current medication  Cardiologist appreciated Dr Biswas      Discussed above with Dr Viera agreed with above                         Jacqueline MEDLEYC

## 2019-12-02 NOTE — PROVIDER CONTACT NOTE (OTHER) - SITUATION
Patient's Heart Rhythm is & has been converting back & forth from Normal Sinus Rhythm to Accelerated Junctional Rhythm on the cardiac monitor ever since patient was admitted to the unit.

## 2019-12-02 NOTE — CONSULT NOTE ADULT - SUBJECTIVE AND OBJECTIVE BOX
CHIEF COMPLAINT: sob    HISTORY OF PRESENT ILLNESS:  65 yo woman with CAD (Cath Feb '19 showing 99% RCA, 100% RPLS, 100% Cx) s/p multiple stents/brachytherapy, ESRD (on HD M/T/T/Sa), DM1 c/b neuropathy and retinopathy, CHF (EF 30% per last [facility]), mod MR, severe AS, RHF, TIA, severe PVD s/p b/l fempop bypass c/b left thrombosed graft, left subclavian vein stenosis s/p stent, COPD not on O2, gout, hilar lymphadenopathy of unknown etiology, frequent hospitalizations (usually 1-3 times a month) presenting with  SOB of 1 day. Patient states that she was walking in her home feeling short of breath. Denies wheezing. Denies associated pleuritic CP or chest pain. Denies palpitations. Endorses a productive cough that worsened with yellow sputum. States that she has been experiencing cough ~1 month. Denies rhinorrhea, nasal congestion, sore throat, fevers, chills, sweats. Denies sick contacts. Denies recent use of antibiotics. Patient also endorses increased left LE edema (chronically edematous from fem-pop bypass) today with worsening pain. States yesterday LE edema was at baseline. Denies trauma. Denies orthopnea or PND. Does not utilize diuretics. Last HD session was Saturday.       Allergies  No Known Allergies        MEDICATIONS:  aspirin enteric coated 81 milliGRAM(s) Oral daily  clopidogrel Tablet 75 milliGRAM(s) Oral daily  hydrALAZINE 50 milliGRAM(s) Oral daily  hydrALAZINE 100 milliGRAM(s) Oral <User Schedule>  lisinopril 40 milliGRAM(s) Oral daily  metoprolol succinate ER 50 milliGRAM(s) Oral daily  piperacillin/tazobactam IVPB.. 3.375 Gram(s) IV Intermittent every 12 hours  buDESOnide    Inhalation Suspension 0.5 milliGRAM(s) Inhalation every 12 hours  acetaminophen   Tablet .. 650 milliGRAM(s) Oral every 6 hours PRN  oxycodone    5 mG/acetaminophen 325 mG 1 Tablet(s) Oral every 12 hours PRN  pantoprazole    Tablet 40 milliGRAM(s) Oral before breakfast  atorvastatin 20 milliGRAM(s) Oral at bedtime  dextrose 40% Gel 15 Gram(s) Oral once PRN  dextrose 50% Injectable 12.5 Gram(s) IV Push once  dextrose 50% Injectable 25 Gram(s) IV Push once  dextrose 50% Injectable 25 Gram(s) IV Push once  glucagon  Injectable 1 milliGRAM(s) IntraMuscular once PRN  insulin glargine Injectable (LANTUS) 8 Unit(s) SubCutaneous at bedtime  insulin lispro (HumaLOG) corrective regimen sliding scale   SubCutaneous three times a day before meals  insulin lispro (HumaLOG) corrective regimen sliding scale   SubCutaneous at bedtime  insulin lispro Injectable (HumaLOG) 3 Unit(s) SubCutaneous three times a day before meals  dextrose 5%. 1000 milliLiter(s) IV Continuous <Continuous>      PAST MEDICAL & SURGICAL HISTORY:  COPD (chronic obstructive pulmonary disease)  Localized enlarged lymph nodes  CHF (congestive heart failure): EF 40-45%  Subclavian vein stenosis, left: s/p stent  DKA, type 1: 1/2015  ACS (acute coronary syndrome): 1/2015 - cath revealed 100% ostial stenosis not amenable to PCI - medical management  TIA (transient ischemic attack): x 2 - 8-9 years ago prior to ASD/VSD repair  CAD (coronary artery disease): s/p stents  Gout: past  CVA (cerebral infarction): with no residual, 8 yrs ago, prior to heart surgery - ST memory loss  Peripheral vascular disease: occluded left fem-pop bypass 5/2015  Diabetes mellitus type 1: Insulin Dependent -  ESRD (end stage renal disease): dialysis  M, tue, thursday, saturday  Hyperlipidemia  Status post device closure of ASD: &quot;clamshell&quot;  History of cardiac catheterization: 1/2015 - no intervention  S/P femoral-popliteal bypass surgery: L and R in 2013 with graft; 5/2015 CFA angioplasty left and ileofemoral endarterectomywith vein patch angioplasty of left fem-pop bypass graft  Multiple vascular surgery both leg, left fempop bypass revision 11/2015  AV (arteriovenous fistula): Left AV graft; revision with stent placement 2-3 years ago  S/P cholecystectomy      FAMILY HISTORY:  Family history of smoking  Family history of hypertension  Family history of cancer (Sibling)      SOCIAL HISTORY:    former smoker. indpendent in adl    REVIEW OF SYSTEMS:  See HPI, otherwise complete 10 point review of systems negative    [ ] All others negative	      PHYSICAL EXAM:  T(C): 36.4 (12-02-19 @ 04:10), Max: 37.1 (12-01-19 @ 18:57)  HR: 91 (12-02-19 @ 04:10) (76 - 102)  BP: 126/68 (12-02-19 @ 04:10) (121/68 - 154/80)  RR: 18 (12-02-19 @ 04:10) (16 - 20)  SpO2: 97% (12-02-19 @ 04:10) (97% - 100%)  Wt(kg): --  I&O's Summary    01 Dec 2019 07:01  -  02 Dec 2019 07:00  --------------------------------------------------------  IN: 300 mL / OUT: 0 mL / NET: 300 mL        Appearance: No Acute Distress	  HEENT:  Normal oral mucosa, PERRL, EOMI	  Cardiovascular: Normal S1 S2, + JVD, 2/6 heraclio  Respiratory: Lungs clear to auscultation bilaterally  Gastrointestinal:  Soft, Non-tender, + BS	  Skin: No rashes, No ecchymoses, No cyanosis	  Neurologic: Non-focal  Extremities: No clubbing, cyanosis. + edema  Vascular: dec pulses bilateral dp  Psychiatry: A & O x 3, Mood & affect appropriate    Laboratory Data:	 	    CBC Full  -  ( 01 Dec 2019 22:13 )  WBC Count : 4.73 K/uL  Hemoglobin : 8.5 g/dL  Hematocrit : 26.8 %  Platelet Count - Automated : 205 K/uL  Mean Cell Volume : 105.1 fl  Mean Cell Hemoglobin : 33.3 pg  Mean Cell Hemoglobin Concentration : 31.7 gm/dL  Auto Neutrophil # : x  Auto Lymphocyte # : x  Auto Monocyte # : x  Auto Eosinophil # : x  Auto Basophil # : x  Auto Neutrophil % : x  Auto Lymphocyte % : x  Auto Monocyte % : x  Auto Eosinophil % : x  Auto Basophil % : x    12-01    140  |  92<L>  |  31<H>  ----------------------------<  142<H>  5.4<H>   |  26  |  5.44<H>  12-01    138  |  92<L>  |  31<H>  ----------------------------<  187<H>  6.6<HH>   |  21<L>  |  5.26<H>    Ca    10.0      01 Dec 2019 18:16  Ca    9.8      01 Dec 2019 16:31    TPro  8.1  /  Alb  4.6  /  TBili  0.2  /  DBili  x   /  AST  44<H>  /  ALT  34  /  AlkPhos  132<H>  12-01      proBNP: Serum Pro-Brain Natriuretic Peptide: 76016 pg/mL (12-01 @ 18:16)    EKG: nsr nonspecific st changes      Assessment:  -volume overload  -SOB  -right lower lobe opacity  -elevated but stable cardiac enzymes (hs trop) with recent admission with relatively preserved ck/ck mb fraction and no obvious ischemic changes on ekg  -hx of NSVT  -pAT  -ischemic cardiomyopathy, cad s/p multiple stents  -esrd on hd  -PAD s/p prior intervention       Recs:  -appears volume up. optimization of volume status with hd. vq scan and venous duplex neg  -new rll opacity --> ? pna. would check pct. consult pulm. hx of pulm nodules and prior biopsies  -known extensive CAD and ICM. s/p LHC 2/20/2019 --> severe and diffuse instent restenosis along RCA --> unable to stent due to multiple layers of stenting and prior brachytherapy. denies any true ischemic sx at present  -c/w beta blockers for nsvt/pat/cad (as above). currently on toprol 50mg, isordil 10mg tid, hydral 25mg tid. uptitrate GDMT as tolerates. wouldnt inc bb at this time 2/2 hx of bronchospasm  -hx of prolonged qtc. avoid qtc prolonging meds  -s/p TTE 2019: mod-severe MR, pseudo AS (low flow-low gradient), mod-severe LV dysfunction --> recent CTS consult with dr anup donaldson. plan is for medical management given surgical risk and patient preference  -c/w asa, plavix, statin for ischemic cardiomyopathy/CAD/PAD  -dvt ppx        Greater than 50 minutes spent on total encounter; more than 50% of the visit was spent counseling and/or coordinating care by the attending physician.   	  Julián Biswas MD   Cardiovascular Diseases  [phone]

## 2019-12-02 NOTE — CONSULT NOTE ADULT - SUBJECTIVE AND OBJECTIVE BOX
[facility]  [phone]  NEPHROLOGY      INITIAL CONSULT NOTE  --------------------------------------------------------------------------------  HPI:      63 yo woman with CAD (Cath Feb '19 showing 99% RCA, 100% RPLS, 100% Cx) s/p multiple stents/brachytherapy, ESRD (on HD M/T/T/Sa), DM1 c/b neuropathy and retinopathy, CHF (EF 30% per last [facility]), mod MR, severe AS, RHF, TIA, severe PVD s/p b/l fempop bypass c/b left thrombosed graft, left subclavian vein stenosis s/p stent, COPD not on O2, gout, hilar lymphadenopathy of unknown etiology, frequent hospitalizations (usually 1-3 times a month) presented with  SOB of 1 day. Patient states that she was walking in her home feeling short of breath. Denies wheezing. Denies associated pleuritic CP or chest pain. Denies palpitations. Endorses a productive cough that worsened with yellow sputum. States that she has been experiencing cough ~1 month. Denies rhinorrhea, nasal congestion, sore throat, fevers, chills, sweats. Patient also endorses increased left LE edema. dx with pna. on abx. also with hyperkalemia and esrd. renal consult called.       PAST HISTORY  --------------------------------------------------------------------------------  PAST MEDICAL & SURGICAL HISTORY:  COPD (chronic obstructive pulmonary disease)  Localized enlarged lymph nodes  CHF (congestive heart failure): EF 40-45%  Subclavian vein stenosis, left: s/p stent  DKA, type 1: 1/2015  ACS (acute coronary syndrome): 1/2015 - cath revealed 100% ostial stenosis not amenable to PCI - medical management  TIA (transient ischemic attack): x 2 - 8-9 years ago prior to ASD/VSD repair  CAD (coronary artery disease): s/p stents  Gout: past  CVA (cerebral infarction): with no residual, 8 yrs ago, prior to heart surgery - ST memory loss  Peripheral vascular disease: occluded left fem-pop bypass 5/2015  Diabetes mellitus type 1: Insulin Dependent -  ESRD (end stage renal disease): dialysis  M, tue, thursday, saturday  Hyperlipidemia  Status post device closure of ASD: &quot;shantel&quot;  History of cardiac catheterization: 1/2015 - no intervention  S/P femoral-popliteal bypass surgery: L and R in 2013 with graft; 5/2015 CFA angioplasty left and ileofemoral endarterectomywith vein patch angioplasty of left fem-pop bypass graft  Multiple vascular surgery both leg, left fempop bypass revision 11/2015  AV (arteriovenous fistula): Left AV graft; revision with stent placement 2-3 years ago  S/P cholecystectomy    FAMILY HISTORY:  Family history of smoking  Family history of hypertension  Family history of cancer (Sibling)    PAST SOCIAL HISTORY: past tobacco use      ALLERGIES & MEDICATIONS  --------------------------------------------------------------------------------  Allergies    No Known Allergies    Intolerances      Standing Inpatient Medications  aspirin enteric coated 81 milliGRAM(s) Oral daily  atorvastatin 20 milliGRAM(s) Oral at bedtime  buDESOnide    Inhalation Suspension 0.5 milliGRAM(s) Inhalation every 12 hours  clopidogrel Tablet 75 milliGRAM(s) Oral daily  dextrose 5%. 1000 milliLiter(s) IV Continuous <Continuous>  dextrose 50% Injectable 12.5 Gram(s) IV Push once  dextrose 50% Injectable 25 Gram(s) IV Push once  dextrose 50% Injectable 25 Gram(s) IV Push once  hydrALAZINE 50 milliGRAM(s) Oral daily  hydrALAZINE 100 milliGRAM(s) Oral <User Schedule>  insulin glargine Injectable (LANTUS) 8 Unit(s) SubCutaneous at bedtime  insulin lispro (HumaLOG) corrective regimen sliding scale   SubCutaneous three times a day before meals  insulin lispro (HumaLOG) corrective regimen sliding scale   SubCutaneous at bedtime  insulin lispro Injectable (HumaLOG) 3 Unit(s) SubCutaneous three times a day before meals  lisinopril 40 milliGRAM(s) Oral daily  metoprolol succinate ER 50 milliGRAM(s) Oral daily  pantoprazole    Tablet 40 milliGRAM(s) Oral before breakfast  piperacillin/tazobactam IVPB.. 3.375 Gram(s) IV Intermittent every 12 hours  sevelamer carbonate 800 milliGRAM(s) Oral three times a day with meals    PRN Inpatient Medications  acetaminophen   Tablet .. 650 milliGRAM(s) Oral every 6 hours PRN  dextrose 40% Gel 15 Gram(s) Oral once PRN  glucagon  Injectable 1 milliGRAM(s) IntraMuscular once PRN  oxycodone    5 mG/acetaminophen 325 mG 1 Tablet(s) Oral every 12 hours PRN      REVIEW OF SYSTEMS  --------------------------------------------------------------------------------  Gen: No  fevers/chills   Skin: No rashes  Head/Eyes/Ears/Mouth: No headache; Normal hearing;  No sinus pain/discomfort, sore throat  Respiratory: +  dyspnea,  + cough,  - wheezing, hemoptysis - , + yellow sputum   CV: No chest pain, or palp   GI: No abdominal pain, diarrhea, nausea, vomiting, melena  : No dysuria, decrease urination +   MSK: No joint pain/swelling; no back pain  Neuro: No dizziness/lightheadedness,  also with  left leg edema     All other systems were reviewed and are negative, except as noted.    VITALS/PHYSICAL EXAM  --------------------------------------------------------------------------------  T(C): 36.3 (12-02-19 @ 18:25), Max: 36.8 (12-01-19 @ 23:52)  HR: 73 (12-02-19 @ 18:25) (70 - 91)  BP: 117/66 (12-02-19 @ 18:25) (117/66 - 153/78)  RR: 18 (12-02-19 @ 18:25) (16 - 18)  SpO2: 98% (12-02-19 @ 18:25) (97% - 100%)  Wt(kg): --  Height (cm): 162.6 (12-02-19 @ 00:10)  Weight (kg): 58.2 (12-02-19 @ 00:10)  BMI (kg/m2): 22 (12-02-19 @ 00:10)  BSA (m2): 1.62 (12-02-19 @ 00:10)      12-01-19 @ 07:01  -  12-02-19 @ 07:00  --------------------------------------------------------  IN: 300 mL / OUT: 0 mL / NET: 300 mL    12-02-19 @ 07:01  -  12-02-19 @ 22:12  --------------------------------------------------------  IN: 780 mL / OUT: 0 mL / NET: 780 mL      Physical Exam:  	Gen: overall ill appearing chronic  	+ JVD  	Pulm: decrease bs  no rales or ronchi or wheezing  	CV: RRR, S1S2; no rub  	Back: No CVA tenderness; no sacral edema  	Abd: +BS, soft, nontender/nondistended  	: No suprapubic tenderness  	UE: Warm, no cyanosis  no clubbing,  no edema; no asterixis  	LE: Warm, no cyanosis  no clubbing, LLE 2+  edema  	Neuro: alert and oriented. speech coherent   	Skin: Warm, no decrease skin turgor   	Vascular access: LUE avf + bruit and thrill     LABS/STUDIES  --------------------------------------------------------------------------------              8.5    4.73  >-----------<  205      [12-01-19 @ 22:13]              26.8     140  |  92  |  31  ----------------------------<  142      [12-01-19 @ 18:16]  5.4   |  26  |  5.44        Ca     10.0     [12-01-19 @ 18:16]      Mg     2.5     [12-02-19 @ 16:44]      Phos  7.3     [12-02-19 @ 16:44]    TPro  8.1  /  Alb  4.6  /  TBili  0.2  /  DBili  x   /  AST  44  /  ALT  34  /  AlkPhos  132  [12-01-19 @ 16:31]          Creatinine Trend:  SCr 5.44 [12-01 @ 18:16]  SCr 5.26 [12-01 @ 16:31]  SCr 4.85 [11-24 @ 02:49]  SCr 3.27 [11-14 @ 22:49]  SCr 4.52 [11-14 @ 07:08]    Urinalysis - [06-04-17 @ 08:24]      Color Yellow / Appearance Clear / SG 1.013 / pH 8.5      Gluc 1000 / Ketone Negative  / Bili Negative / Urobili Negative       Blood Negative / Protein >600 / Leuk Est Small / Nitrite Negative      RBC 3-5 / WBC 6-10 / Hyaline  / Gran  / Sq Epi  / Non Sq Epi OCC / Bacteria       Iron 42, TIBC 253, %sat 17      [06-17-19 @ 17:54]  Ferritin 1838      [06-17-19 @ 18:06]  PTH -- (Ca 9.6)      [06-17-19 @ 18:06]   177  PTH -- (Ca 7.8)      [03-29-19 @ 20:38]   73  PTH -- (Ca 7.3)      [02-22-19 @ 02:49]   59  HbA1c 8.7      [11-13-19 @ 23:44]  TSH 1.78      [11-14-19 @ 09:15]  Lipid: chol 108, TG 76, HDL 57, LDL 36      [11-14-19 @ 09:16]    HBsAb <3.0      [11-14-19 @ 05:06]  HBsAb Nonreact      [11-14-19 @ 04:43]  HBsAg Nonreact      [11-14-19 @ 04:43]  HBcAb Nonreact      [11-14-19 @ 04:43]  HCV 0.15, Nonreact      [11-14-19 @ 04:43]    < from: Xray Chest 2 Views PA/Lat (12.01.19 @ 17:35) >  EXAM:  XR CHEST PA LAT 2V                            PROCEDURE DATE:  12/01/2019            INTERPRETATION:    INDICATION: Shortness of Breath    TECHNIQUE: Chest, PA and lateral views    COMPARISON: No prior exams are available for comparison at this time.    FINDINGS:  Emphysematous changes. There is a new right lower lobe opacity.   No focal airspace consolidation, pleural effusion or pneumothorax.   Cardiac silhouette is normal in width and contour. Cardiac stent.  Mediastinal and hilar contours are unremarkable.   Visualized bony thorax demonstrates no gross abnormality.   Vascular stent overlies the left hemithorax.    IMPRESSION:   New right lower lobe opacity, likely pneumonia.      < end of copied text >

## 2019-12-02 NOTE — PROGRESS NOTE ADULT - SUBJECTIVE AND OBJECTIVE BOX
PULMONARY PROGRESS NOTE    NATHAN MEEKS  MRN-[id number]    Patient is a 64y old  Female who presents with a chief complaint of shortness of breath (02 Dec 2019 07:20)      HPI:  -here with dyspnea, volume overload, pna on cxr    ROS:   -tired    ACTIVE MEDICATION LIST:  MEDICATIONS  (STANDING):  aspirin enteric coated 81 milliGRAM(s) Oral daily  atorvastatin 20 milliGRAM(s) Oral at bedtime  buDESOnide    Inhalation Suspension 0.5 milliGRAM(s) Inhalation every 12 hours  clopidogrel Tablet 75 milliGRAM(s) Oral daily  dextrose 5%. 1000 milliLiter(s) (50 mL/Hr) IV Continuous <Continuous>  dextrose 50% Injectable 12.5 Gram(s) IV Push once  dextrose 50% Injectable 25 Gram(s) IV Push once  dextrose 50% Injectable 25 Gram(s) IV Push once  hydrALAZINE 50 milliGRAM(s) Oral daily  hydrALAZINE 100 milliGRAM(s) Oral <User Schedule>  insulin glargine Injectable (LANTUS) 8 Unit(s) SubCutaneous at bedtime  insulin lispro (HumaLOG) corrective regimen sliding scale   SubCutaneous three times a day before meals  insulin lispro (HumaLOG) corrective regimen sliding scale   SubCutaneous at bedtime  insulin lispro Injectable (HumaLOG) 3 Unit(s) SubCutaneous three times a day before meals  lisinopril 40 milliGRAM(s) Oral daily  metoprolol succinate ER 50 milliGRAM(s) Oral daily  pantoprazole    Tablet 40 milliGRAM(s) Oral before breakfast  piperacillin/tazobactam IVPB.. 3.375 Gram(s) IV Intermittent every 12 hours  sevelamer carbonate 800 milliGRAM(s) Oral three times a day with meals    MEDICATIONS  (PRN):  acetaminophen   Tablet .. 650 milliGRAM(s) Oral every 6 hours PRN Mild Pain (1 - 3), Moderate Pain (4 - 6)  dextrose 40% Gel 15 Gram(s) Oral once PRN Blood Glucose LESS THAN 70 milliGRAM(s)/deciliter  glucagon  Injectable 1 milliGRAM(s) IntraMuscular once PRN Glucose LESS THAN 70 milligrams/deciliter  oxycodone    5 mG/acetaminophen 325 mG 1 Tablet(s) Oral every 12 hours PRN Severe Pain (7 - 10)      EXAM:  Vital Signs Last 24 Hrs  T(C): 36.6 (02 Dec 2019 11:23), Max: 37.1 (01 Dec 2019 18:57)  T(F): 97.8 (02 Dec 2019 11:23), Max: 98.7 (01 Dec 2019 18:57)  HR: 76 (02 Dec 2019 11:23) (70 - 102)  BP: 119/67 (02 Dec 2019 11:23) (119/67 - 154/80)  BP(mean): 91 (01 Dec 2019 18:57) (91 - 91)  RR: 18 (02 Dec 2019 11:23) (16 - 20)  SpO2: 100% (02 Dec 2019 11:23) (97% - 100%)    GENERAL: The patient is awake and alert in no apparent distress.     LUNGS: decreased at right base, not taking good deep breaths        LABS/IMAGING: reviewed                        8.5    4.73  )-----------( 205      ( 01 Dec 2019 22:13 )             26.8   12-01    140  |  92<L>  |  31<H>  ----------------------------<  142<H>  5.4<H>   |  26  |  5.44<H>    Ca    10.0      01 Dec 2019 18:16    TPro  8.1  /  Alb  4.6  /  TBili  0.2  /  DBili  x   /  AST  44<H>  /  ALT  34  /  AlkPhos  132<H>  12-01    < from: Xray Chest 2 Views PA/Lat (12.01.19 @ 17:35) >  IMPRESSION:   New right lower lobe opacity, likely pneumonia.    < end of copied text >  < from: NM Pulmonary Ventilation/Perfusion Scan (12.01.19 @ 22:58) >  IMPRESSION:     Very low probability of pulmonary embolus.    < end of copied text >      PROBLEM LIST:  64y Female with HEALTH ISSUES - PROBLEM Dx:  pneumonia  COPD  Diabetes mellitus type 1: Diabetes mellitus type 1  CAD (coronary artery disease): CAD (coronary artery disease)  ESRD (end stage renal disease): ESRD (end stage renal disease)  Chronic systolic congestive heart failure: Chronic systolic congestive heart failure  Thrombocytopenia: Thrombocytopenia  Lower extremity edema: Lower extremity edema    RECS:  -agree with abx  -pulmicort bid, add duonebs  -appreciate cards eval  -renal fu, fluid removal      Alem Tate MD   [phone]

## 2019-12-02 NOTE — PROGRESS NOTE ADULT - SUBJECTIVE AND OBJECTIVE BOX
Patient is a 64y old  Female who presents with a chief complaint of shortness of breath (02 Dec 2019 13:45)      SUBJECTIVE / OVERNIGHT EVENTS: weak.  Review of Systems  chest pain no  palpitations no  sob no  nausea no  headache no    MEDICATIONS  (STANDING):  aspirin enteric coated 81 milliGRAM(s) Oral daily  atorvastatin 20 milliGRAM(s) Oral at bedtime  buDESOnide    Inhalation Suspension 0.5 milliGRAM(s) Inhalation every 12 hours  clopidogrel Tablet 75 milliGRAM(s) Oral daily  dextrose 5%. 1000 milliLiter(s) (50 mL/Hr) IV Continuous <Continuous>  dextrose 50% Injectable 12.5 Gram(s) IV Push once  dextrose 50% Injectable 25 Gram(s) IV Push once  dextrose 50% Injectable 25 Gram(s) IV Push once  hydrALAZINE 50 milliGRAM(s) Oral daily  hydrALAZINE 100 milliGRAM(s) Oral <User Schedule>  insulin glargine Injectable (LANTUS) 8 Unit(s) SubCutaneous at bedtime  insulin lispro (HumaLOG) corrective regimen sliding scale   SubCutaneous three times a day before meals  insulin lispro (HumaLOG) corrective regimen sliding scale   SubCutaneous at bedtime  insulin lispro Injectable (HumaLOG) 3 Unit(s) SubCutaneous three times a day before meals  lisinopril 40 milliGRAM(s) Oral daily  metoprolol succinate ER 50 milliGRAM(s) Oral daily  pantoprazole    Tablet 40 milliGRAM(s) Oral before breakfast  piperacillin/tazobactam IVPB.. 3.375 Gram(s) IV Intermittent every 12 hours  sevelamer carbonate 800 milliGRAM(s) Oral three times a day with meals    MEDICATIONS  (PRN):  acetaminophen   Tablet .. 650 milliGRAM(s) Oral every 6 hours PRN Mild Pain (1 - 3), Moderate Pain (4 - 6)  dextrose 40% Gel 15 Gram(s) Oral once PRN Blood Glucose LESS THAN 70 milliGRAM(s)/deciliter  glucagon  Injectable 1 milliGRAM(s) IntraMuscular once PRN Glucose LESS THAN 70 milligrams/deciliter  oxycodone    5 mG/acetaminophen 325 mG 1 Tablet(s) Oral every 12 hours PRN Severe Pain (7 - 10)      Vital Signs Last 24 Hrs  T(C): 36.3 (02 Dec 2019 18:25), Max: 36.8 (01 Dec 2019 23:52)  T(F): 97.3 (02 Dec 2019 18:25), Max: 98.3 (01 Dec 2019 23:52)  HR: 73 (02 Dec 2019 18:25) (70 - 102)  BP: 117/66 (02 Dec 2019 18:25) (117/66 - 153/78)  BP(mean): --  RR: 18 (02 Dec 2019 18:25) (16 - 20)  SpO2: 98% (02 Dec 2019 18:25) (97% - 100%)    PHYSICAL EXAM:  GENERAL: NAD   HEAD:  Atraumatic, Normocephalic  EYES: EOMI, PERRLA, conjunctiva and sclera clear  NECK: Supple, No JVD  CHEST/LUNG: Clear to auscultation bilaterally; No wheeze  HEART: Regular rate and rhythm; No murmurs, rubs, or gallops  ABDOMEN: Soft, Nontender, Nondistended; Bowel sounds present   EXTREMITIES:  2+bipedal edema L>R  PSYCH: AAOx3   NEUROLOGY: non-focal  SKIN: No rashes or lesions    LABS:                        8.5    4.73  )-----------( 205      ( 01 Dec 2019 22:13 )             26.8     12-01    140  |  92<L>  |  31<H>  ----------------------------<  142<H>  5.4<H>   |  26  |  5.44<H>    Ca    10.0      01 Dec 2019 18:16  Phos  7.3     12-02  Mg     2.5     12-02    TPro  8.1  /  Alb  4.6  /  TBili  0.2  /  DBili  x   /  AST  44<H>  /  ALT  34  /  AlkPhos  132<H>  12-01                RADIOLOGY & ADDITIONAL TESTS:    Imaging Personally Reviewed:    Consultant(s) Notes Reviewed:      Care Discussed with Consultants/Other Providers:

## 2019-12-02 NOTE — PROGRESS NOTE ADULT - ASSESSMENT
· Assessment		  65 yo woman with PMH of CAD, DM1 c/b neuropathy and retinopathy, CHF (EF 30%), mod MR, severe AS, RHF, TIA, severe PVD s/p b/l fempop bypass c/b left thrombosed graft, left subclavian vein stenosis s/p stent, COPD not on O2, gout, hilar lymphadenopathy of unknown etiology, frequent hospitalizations (usually 1-3 times a month) p/w SOB, cough, LLE pain found with possible RLL PNA and LLE cellulitis    Shortness of breath.   - Right lower lung opacity: possible PNA.  - continue Vanc and Zosyn . Check Vanc by level. Renally dose Zosyn ID evaluation called.  - Pulmonary evaluation  -Check ambulatory saturation off supplemental O2.     Lower extremity edema.   - LE doppler negative for DVT  - Reference: # [id number] Last prescribed Percocet 5/325 in October 2019  - Percocet prn for pain O/N.     Thrombocytopenia.   - Thrombocytopenia on labs. Unclear if spurious result or true.  - Follow CBC with T&S     Chronic systolic congestive heart failure.  - Appears euvolemic.  - Continue with Lisinopril.    Cardiology follow.    ESRD (end stage renal disease).   - Renal evaluation Dr. Schmidt called.     CAD (coronary artery disease).   -  Troponin elevated likely in setting of ESRD. Similar level to prior labs  - EKG unchanged  - Continue Hydralazine  - Per patient no longer takes Isosorbide Dinitrate   - Cardiology follow.    Diabetes mellitus type 1.  - Compliant with medications  - Continue with Humalog 3U TID premeal  - Continue with corrective SSI  - Continue with Lantus (will start with 8U tonight) as patient with latest glucose of 134.     Prophylactic measure.  -  DVT ppx: Hold Pharmacologic DVT in setting of new thrombocytopenia. SCDs    Fall risk protocol.   Pierce Viera MD pager [phone]

## 2019-12-03 LAB
ANION GAP SERPL CALC-SCNC: 16 MMOL/L — SIGNIFICANT CHANGE UP (ref 5–17)
BUN SERPL-MCNC: 16 MG/DL — SIGNIFICANT CHANGE UP (ref 7–23)
CALCIUM SERPL-MCNC: 8.9 MG/DL — SIGNIFICANT CHANGE UP (ref 8.4–10.5)
CHLORIDE SERPL-SCNC: 93 MMOL/L — LOW (ref 96–108)
CO2 SERPL-SCNC: 25 MMOL/L — SIGNIFICANT CHANGE UP (ref 22–31)
CREAT SERPL-MCNC: 3.82 MG/DL — HIGH (ref 0.5–1.3)
GLUCOSE BLDC GLUCOMTR-MCNC: 140 MG/DL — HIGH (ref 70–99)
GLUCOSE BLDC GLUCOMTR-MCNC: 182 MG/DL — HIGH (ref 70–99)
GLUCOSE BLDC GLUCOMTR-MCNC: 186 MG/DL — HIGH (ref 70–99)
GLUCOSE BLDC GLUCOMTR-MCNC: 251 MG/DL — HIGH (ref 70–99)
GLUCOSE SERPL-MCNC: 229 MG/DL — HIGH (ref 70–99)
HCT VFR BLD CALC: 27 % — LOW (ref 34.5–45)
HGB BLD-MCNC: 8.6 G/DL — LOW (ref 11.5–15.5)
MCHC RBC-ENTMCNC: 31.9 GM/DL — LOW (ref 32–36)
MCHC RBC-ENTMCNC: 33.1 PG — SIGNIFICANT CHANGE UP (ref 27–34)
MCV RBC AUTO: 103.8 FL — HIGH (ref 80–100)
NRBC # BLD: 0 /100 WBCS — SIGNIFICANT CHANGE UP (ref 0–0)
PLATELET # BLD AUTO: 185 K/UL — SIGNIFICANT CHANGE UP (ref 150–400)
POTASSIUM SERPL-MCNC: 4.9 MMOL/L — SIGNIFICANT CHANGE UP (ref 3.5–5.3)
POTASSIUM SERPL-SCNC: 4.9 MMOL/L — SIGNIFICANT CHANGE UP (ref 3.5–5.3)
RBC # BLD: 2.6 M/UL — LOW (ref 3.8–5.2)
RBC # FLD: 13.9 % — SIGNIFICANT CHANGE UP (ref 10.3–14.5)
SODIUM SERPL-SCNC: 134 MMOL/L — LOW (ref 135–145)
WBC # BLD: 4.51 K/UL — SIGNIFICANT CHANGE UP (ref 3.8–10.5)
WBC # FLD AUTO: 4.51 K/UL — SIGNIFICANT CHANGE UP (ref 3.8–10.5)

## 2019-12-03 PROCEDURE — 71250 CT THORAX DX C-: CPT | Mod: 26

## 2019-12-03 PROCEDURE — 93926 LOWER EXTREMITY STUDY: CPT | Mod: 26,LT

## 2019-12-03 RX ORDER — ERYTHROPOIETIN 10000 [IU]/ML
10000 INJECTION, SOLUTION INTRAVENOUS; SUBCUTANEOUS ONCE
Refills: 0 | Status: COMPLETED | OUTPATIENT
Start: 2019-12-04 | End: 2019-12-04

## 2019-12-03 RX ORDER — HEPARIN SODIUM 5000 [USP'U]/ML
5000 INJECTION INTRAVENOUS; SUBCUTANEOUS EVERY 12 HOURS
Refills: 0 | Status: DISCONTINUED | OUTPATIENT
Start: 2019-12-03 | End: 2019-12-09

## 2019-12-03 RX ORDER — LIDOCAINE 4 G/100G
1 CREAM TOPICAL ONCE
Refills: 0 | Status: COMPLETED | OUTPATIENT
Start: 2019-12-03 | End: 2019-12-03

## 2019-12-03 RX ADMIN — SEVELAMER CARBONATE 800 MILLIGRAM(S): 2400 POWDER, FOR SUSPENSION ORAL at 12:01

## 2019-12-03 RX ADMIN — Medication 81 MILLIGRAM(S): at 12:01

## 2019-12-03 RX ADMIN — Medication 100 MILLIGRAM(S): at 17:48

## 2019-12-03 RX ADMIN — Medication 3 UNIT(S): at 17:48

## 2019-12-03 RX ADMIN — Medication 0.5 MILLIGRAM(S): at 06:01

## 2019-12-03 RX ADMIN — Medication 1: at 13:30

## 2019-12-03 RX ADMIN — SEVELAMER CARBONATE 800 MILLIGRAM(S): 2400 POWDER, FOR SUSPENSION ORAL at 08:45

## 2019-12-03 RX ADMIN — Medication 50 MILLIGRAM(S): at 06:01

## 2019-12-03 RX ADMIN — LISINOPRIL 40 MILLIGRAM(S): 2.5 TABLET ORAL at 06:01

## 2019-12-03 RX ADMIN — OXYCODONE AND ACETAMINOPHEN 1 TABLET(S): 5; 325 TABLET ORAL at 06:01

## 2019-12-03 RX ADMIN — PANTOPRAZOLE SODIUM 40 MILLIGRAM(S): 20 TABLET, DELAYED RELEASE ORAL at 06:01

## 2019-12-03 RX ADMIN — CLOPIDOGREL BISULFATE 75 MILLIGRAM(S): 75 TABLET, FILM COATED ORAL at 12:01

## 2019-12-03 RX ADMIN — OXYCODONE AND ACETAMINOPHEN 1 TABLET(S): 5; 325 TABLET ORAL at 06:44

## 2019-12-03 RX ADMIN — PIPERACILLIN AND TAZOBACTAM 25 GRAM(S): 4; .5 INJECTION, POWDER, LYOPHILIZED, FOR SOLUTION INTRAVENOUS at 12:01

## 2019-12-03 RX ADMIN — LIDOCAINE 1 PATCH: 4 CREAM TOPICAL at 03:31

## 2019-12-03 RX ADMIN — Medication 650 MILLIGRAM(S): at 02:09

## 2019-12-03 RX ADMIN — LIDOCAINE 1 PATCH: 4 CREAM TOPICAL at 07:00

## 2019-12-03 RX ADMIN — Medication 650 MILLIGRAM(S): at 01:09

## 2019-12-03 RX ADMIN — Medication 0.5 MILLIGRAM(S): at 17:48

## 2019-12-03 RX ADMIN — Medication 1: at 08:45

## 2019-12-03 RX ADMIN — ATORVASTATIN CALCIUM 20 MILLIGRAM(S): 80 TABLET, FILM COATED ORAL at 21:21

## 2019-12-03 RX ADMIN — Medication 50 MILLIGRAM(S): at 08:46

## 2019-12-03 RX ADMIN — OXYCODONE AND ACETAMINOPHEN 1 TABLET(S): 5; 325 TABLET ORAL at 21:17

## 2019-12-03 RX ADMIN — PIPERACILLIN AND TAZOBACTAM 25 GRAM(S): 4; .5 INJECTION, POWDER, LYOPHILIZED, FOR SOLUTION INTRAVENOUS at 23:29

## 2019-12-03 RX ADMIN — OXYCODONE AND ACETAMINOPHEN 1 TABLET(S): 5; 325 TABLET ORAL at 20:17

## 2019-12-03 RX ADMIN — Medication 1: at 21:22

## 2019-12-03 RX ADMIN — LIDOCAINE 1 PATCH: 4 CREAM TOPICAL at 15:00

## 2019-12-03 RX ADMIN — INSULIN GLARGINE 8 UNIT(S): 100 INJECTION, SOLUTION SUBCUTANEOUS at 21:21

## 2019-12-03 RX ADMIN — SEVELAMER CARBONATE 800 MILLIGRAM(S): 2400 POWDER, FOR SUSPENSION ORAL at 17:48

## 2019-12-03 RX ADMIN — Medication 3 UNIT(S): at 13:30

## 2019-12-03 RX ADMIN — Medication 3 UNIT(S): at 08:45

## 2019-12-03 NOTE — CONSULT NOTE ADULT - SUBJECTIVE AND OBJECTIVE BOX
HPI:   Patient is a 64y female with HLD, CAD, ESRD on HD, CHF, severe AS, COPD, CVA, PVD s/p b/l fem pop bypass, hx hilar adenopathy, DM, prior hospitalizations for DKA, who was admitted with parainfluenza multifocal pneumonia in Jun 2019, then admitted in Sep first for RV/EV URI followed by admission for post viral HCAP & treated with cefepime.      Presented to the ER with c/o worsening SOB x 1 day. Reports that she could not catch her breath with simple walking in her home. Reports that she always has a productive cough from her COPD and may be it was worse x 2 wks but she describes herself a poor historian because of prior CVAs. Reports that yesterday her left leg which is chronically swollen post bypass surgery also appeared very very red & painful     REVIEW OF SYSTEMS:  All other review of systems negative (Comprehensive ROS) except as above     PAST MEDICAL & SURGICAL HISTORY:  COPD (chronic obstructive pulmonary disease)  Localized enlarged lymph nodes  CHF (congestive heart failure): EF 40-45%  Subclavian vein stenosis, left: s/p stent  DKA, type 1: 1/2015  ACS (acute coronary syndrome): 1/2015 - cath revealed 100% ostial stenosis not amenable to PCI - medical management  TIA (transient ischemic attack): x 2 - 8-9 years ago prior to ASD/VSD repair  CAD (coronary artery disease): s/p stents  Gout: past  CVA (cerebral infarction): with no residual, 8 yrs ago, prior to heart surgery - ST memory loss  Peripheral vascular disease: occluded left fem-pop bypass 5/2015  Diabetes mellitus type 1: Insulin Dependent -  ESRD (end stage renal disease): dialysis  M, tue, thursday, saturday  Hyperlipidemia  Status post device closure of ASD: &quot;clamshell&quot;  History of cardiac catheterization: 1/2015 - no intervention  S/P femoral-popliteal bypass surgery: L and R in 2013 with graft; 5/2015 CFA angioplasty left and ileofemoral endarterectomywith vein patch angioplasty of left fem-pop bypass graft  Multiple vascular surgery both leg, left fempop bypass revision 11/2015  AV (arteriovenous fistula): Left AV graft; revision with stent placement 2-3 years ago  S/P cholecystectomy      Allergies  No Known Allergies    Intolerances      Antimicrobials Day #  :2  piperacillin/tazobactam IVPB.. 3.375 Gram(s) IV Intermittent every 12 hours    Other Medications:  acetaminophen   Tablet .. 650 milliGRAM(s) Oral every 6 hours PRN  aspirin enteric coated 81 milliGRAM(s) Oral daily  atorvastatin 20 milliGRAM(s) Oral at bedtime  buDESOnide    Inhalation Suspension 0.5 milliGRAM(s) Inhalation every 12 hours  clopidogrel Tablet 75 milliGRAM(s) Oral daily  dextrose 40% Gel 15 Gram(s) Oral once PRN  dextrose 5%. 1000 milliLiter(s) IV Continuous <Continuous>  dextrose 50% Injectable 12.5 Gram(s) IV Push once  dextrose 50% Injectable 25 Gram(s) IV Push once  dextrose 50% Injectable 25 Gram(s) IV Push once  glucagon  Injectable 1 milliGRAM(s) IntraMuscular once PRN  hydrALAZINE 50 milliGRAM(s) Oral daily  hydrALAZINE 100 milliGRAM(s) Oral <User Schedule>  insulin glargine Injectable (LANTUS) 8 Unit(s) SubCutaneous at bedtime  insulin lispro (HumaLOG) corrective regimen sliding scale   SubCutaneous three times a day before meals  insulin lispro (HumaLOG) corrective regimen sliding scale   SubCutaneous at bedtime  insulin lispro Injectable (HumaLOG) 3 Unit(s) SubCutaneous three times a day before meals  lisinopril 40 milliGRAM(s) Oral daily  metoprolol succinate ER 50 milliGRAM(s) Oral daily  oxycodone    5 mG/acetaminophen 325 mG 1 Tablet(s) Oral every 12 hours PRN  pantoprazole    Tablet 40 milliGRAM(s) Oral before breakfast  sevelamer carbonate 800 milliGRAM(s) Oral three times a day with meals      FAMILY HISTORY:  Family history of smoking  Family history of hypertension  Family history of cancer (Sibling)      SOCIAL HISTORY:  Smoking: quit 18 yrs ago    ETOH:  No   Drug Use: No        T(F): 97.9 (12-03-19 @ 14:28), Max: 98.4 (12-02-19 @ 23:15)  HR: 69 (12-03-19 @ 14:28)  BP: 113/56 (12-03-19 @ 14:28)  RR: 19 (12-03-19 @ 14:28)  SpO2: 100% (12-03-19 @ 14:28)  Wt(kg): --    PHYSICAL EXAM:  General: alert, no acute distress, appears much older than her stated age.   Eyes:  anicteric, no conjunctival injection, no discharge  Oropharynx: no lesions or injection 	  Neck: supple, without adenopathy  Lungs: clear to auscultation  Heart: regular rate and rhythm; no murmurs  Abdomen: soft, nondistended, nontender, without mass or organomegaly  Skin: no lesions  Extremities: no clubbing or cyanosis. LLE with 2+ edema, but neither red nor warm   Neurologic: alert, oriented, moves all extremities    LAB RESULTS:                        8.6    4.51  )-----------( 185      ( 03 Dec 2019 05:54 )             27.0     12-03    134<L>  |  93<L>  |  16  ----------------------------<  229<H>  4.9   |  25  |  3.82<H>    Ca    8.9      03 Dec 2019 05:54  Phos  7.3     12-02  Mg     2.5     12-02    TPro  8.1  /  Alb  4.6  /  TBili  0.2  /  DBili  x   /  AST  44<H>  /  ALT  34  /  AlkPhos  132<H>  12-01    LIVER FUNCTIONS - ( 01 Dec 2019 16:31 )  Alb: 4.6 g/dL / Pro: 8.1 g/dL / ALK PHOS: 132 U/L / ALT: 34 U/L / AST: 44 U/L / GGT: x               MICROBIOLOGY:  RECENT CULTURES:        RADIOLOGY REVIEWED:  Xray Chest 2 Views PA/Lat (12.01.19 @ 17:35)   New right lower lobe opacity, likely pneumonia.

## 2019-12-03 NOTE — PROGRESS NOTE ADULT - ASSESSMENT
65 yo woman with CAD (Cath Feb '19 showing 99% RCA, 100% RPLS, 100% Cx) s/p multiple stents/brachytherapy, ESRD (on HD M/T/T/Sa), DM1 c/b neuropathy and retinopathy, CHF (EF 30% per last [facility]), mod MR, severe AS, RHF, TIA, severe PVD s/p b/l fempop bypass c/b left thrombosed graft, left subclavian vein stenosis s/p stent, COPD not on O2, gout, hilar lymphadenopathy of unknown etiology, frequent hospitalizations (usually 1-3 times a month) presented with  SOB of 1 day. had cxr revealing pneumonia.     1- ESRD  2- HTN   3- SHPT  4- Pneumonia    hd am  continue with hydralazine and ace-I  renvela 1 tab with meals  zosyn 3.3.75 g iv q12  nebs   check iron profile and intact pth

## 2019-12-03 NOTE — CONSULT NOTE ADULT - ASSESSMENT
64y female with HLD, CAD, ESRD on HD, CHF, severe AS, COPD, CVA, PVD s/p b/l fem pop bypass, hx hilar adenopathy, DM, prior hospitalizations for DKA, Parainfluenza multifocal pneumonia in Jun 2019, then RV/EV URI in Sep 2019, followed by post viral HCAP treated with cefepime.    Her last CXR on 11/24/19 had revealed clear lungs    Presented to the ER on 12/01/19 with c/o worsening SOB x 1 day & her left leg which is chronically swollen post bypass surgery also appeared very red & painful   Found afebrile & without leukocytosis  D dimer was elevated but V/Q scan with low probability of PE  Markedly elevated pBNP - 27136  CXR revealed a new RLL opacity concerning for PNA - started on zosyn & ID called.   Exam with bibasilar rales & 2+ edema of LLE without support for ongoing cellulitis today.  I am concerned if the RLL opacity could be fluid given elevated pBNP & pt without sepsis.     PLAN:  Change zosyn to cefepime to limit fluid overload  Check CT chest without contrast to better clarify the CXR findings & new RLL opacity.

## 2019-12-03 NOTE — PROGRESS NOTE ADULT - SUBJECTIVE AND OBJECTIVE BOX
Patient is a 64y old  Female who presents with a chief complaint of shortness of breath (03 Dec 2019 14:36)      SUBJECTIVE / OVERNIGHT EVENTS: weak.  Review of Systems  chest pain no  palpitations no  sob no  nausea no  headache no    MEDICATIONS  (STANDING):  aspirin enteric coated 81 milliGRAM(s) Oral daily  atorvastatin 20 milliGRAM(s) Oral at bedtime  buDESOnide    Inhalation Suspension 0.5 milliGRAM(s) Inhalation every 12 hours  clopidogrel Tablet 75 milliGRAM(s) Oral daily  dextrose 5%. 1000 milliLiter(s) (50 mL/Hr) IV Continuous <Continuous>  dextrose 50% Injectable 12.5 Gram(s) IV Push once  dextrose 50% Injectable 25 Gram(s) IV Push once  dextrose 50% Injectable 25 Gram(s) IV Push once  hydrALAZINE 50 milliGRAM(s) Oral daily  hydrALAZINE 100 milliGRAM(s) Oral <User Schedule>  insulin glargine Injectable (LANTUS) 8 Unit(s) SubCutaneous at bedtime  insulin lispro (HumaLOG) corrective regimen sliding scale   SubCutaneous three times a day before meals  insulin lispro (HumaLOG) corrective regimen sliding scale   SubCutaneous at bedtime  insulin lispro Injectable (HumaLOG) 3 Unit(s) SubCutaneous three times a day before meals  lisinopril 40 milliGRAM(s) Oral daily  metoprolol succinate ER 50 milliGRAM(s) Oral daily  pantoprazole    Tablet 40 milliGRAM(s) Oral before breakfast  piperacillin/tazobactam IVPB.. 3.375 Gram(s) IV Intermittent every 12 hours  sevelamer carbonate 800 milliGRAM(s) Oral three times a day with meals    MEDICATIONS  (PRN):  acetaminophen   Tablet .. 650 milliGRAM(s) Oral every 6 hours PRN Mild Pain (1 - 3), Moderate Pain (4 - 6)  dextrose 40% Gel 15 Gram(s) Oral once PRN Blood Glucose LESS THAN 70 milliGRAM(s)/deciliter  glucagon  Injectable 1 milliGRAM(s) IntraMuscular once PRN Glucose LESS THAN 70 milligrams/deciliter  oxycodone    5 mG/acetaminophen 325 mG 1 Tablet(s) Oral every 12 hours PRN Severe Pain (7 - 10)      Vital Signs Last 24 Hrs  T(C): 36.6 (03 Dec 2019 14:28), Max: 36.9 (02 Dec 2019 23:15)  T(F): 97.9 (03 Dec 2019 14:28), Max: 98.4 (02 Dec 2019 23:15)  HR: 78 (03 Dec 2019 17:47) (69 - 98)  BP: 128/69 (03 Dec 2019 17:47) (112/65 - 131/75)  BP(mean): --  RR: 19 (03 Dec 2019 14:28) (18 - 19)  SpO2: 100% (03 Dec 2019 14:28) (94% - 100%)    PHYSICAL EXAM:  GENERAL: NAD  HEAD:  Atraumatic, Normocephalic  EYES: EOMI, PERRLA, conjunctiva and sclera clear  NECK: Supple, No JVD  CHEST/LUNG: Clear to auscultation bilaterally; No wheeze  HEART: Regular rate and rhythm; No murmurs, rubs, or gallops  ABDOMEN: Soft, Nontender, Nondistended; Bowel sounds present  EXTREMITIES:  2+ Peripheral Pulses, No clubbing, cyanosis, or edema  PSYCH: depressed  NEUROLOGY: non-focal  SKIN: No rashes or lesions    LABS:                        8.6    4.51  )-----------( 185      ( 03 Dec 2019 05:54 )             27.0     12-03    134<L>  |  93<L>  |  16  ----------------------------<  229<H>  4.9   |  25  |  3.82<H>    Ca    8.9      03 Dec 2019 05:54  Phos  7.3     12-02  Mg     2.5     12-02                  RADIOLOGY & ADDITIONAL TESTS:    Imaging Personally Reviewed:    Consultant(s) Notes Reviewed:      Care Discussed with Consultants/Other Providers:

## 2019-12-03 NOTE — PROGRESS NOTE ADULT - SUBJECTIVE AND OBJECTIVE BOX
[facility]   [phone]  RENAL FOLLOW UP NOTE  --------------------------------------------------------------------------------  Chief Complaint:    24 hour events/subjective:    states breathing is improving but still with yellow sputum + cough     PAST HISTORY  --------------------------------------------------------------------------------  No significant changes to PMH, PSH, FHx, SHx, unless otherwise noted    ALLERGIES & MEDICATIONS  --------------------------------------------------------------------------------  Allergies    No Known Allergies    Intolerances      Standing Inpatient Medications  aspirin enteric coated 81 milliGRAM(s) Oral daily  atorvastatin 20 milliGRAM(s) Oral at bedtime  buDESOnide    Inhalation Suspension 0.5 milliGRAM(s) Inhalation every 12 hours  clopidogrel Tablet 75 milliGRAM(s) Oral daily  dextrose 5%. 1000 milliLiter(s) IV Continuous <Continuous>  dextrose 50% Injectable 12.5 Gram(s) IV Push once  dextrose 50% Injectable 25 Gram(s) IV Push once  dextrose 50% Injectable 25 Gram(s) IV Push once  hydrALAZINE 50 milliGRAM(s) Oral daily  hydrALAZINE 100 milliGRAM(s) Oral <User Schedule>  insulin glargine Injectable (LANTUS) 8 Unit(s) SubCutaneous at bedtime  insulin lispro (HumaLOG) corrective regimen sliding scale   SubCutaneous three times a day before meals  insulin lispro (HumaLOG) corrective regimen sliding scale   SubCutaneous at bedtime  insulin lispro Injectable (HumaLOG) 3 Unit(s) SubCutaneous three times a day before meals  lisinopril 40 milliGRAM(s) Oral daily  metoprolol succinate ER 50 milliGRAM(s) Oral daily  pantoprazole    Tablet 40 milliGRAM(s) Oral before breakfast  piperacillin/tazobactam IVPB.. 3.375 Gram(s) IV Intermittent every 12 hours  sevelamer carbonate 800 milliGRAM(s) Oral three times a day with meals    PRN Inpatient Medications  acetaminophen   Tablet .. 650 milliGRAM(s) Oral every 6 hours PRN  dextrose 40% Gel 15 Gram(s) Oral once PRN  glucagon  Injectable 1 milliGRAM(s) IntraMuscular once PRN  oxycodone    5 mG/acetaminophen 325 mG 1 Tablet(s) Oral every 12 hours PRN      REVIEW OF SYSTEMS  --------------------------------------------------------------------------------    Gen: denies  fevers/chills,  CVS: denies chest pain/palpitations  Resp: + SOB/Cough  GI: Denies N/V/Abd pain  : Denies dysuria    All other systems were reviewed and are negative, except as noted.    VITALS/PHYSICAL EXAM  --------------------------------------------------------------------------------  T(C): 36.6 (12-03-19 @ 14:28), Max: 36.9 (12-02-19 @ 23:15)  HR: 78 (12-03-19 @ 17:47) (69 - 98)  BP: 128/69 (12-03-19 @ 17:47) (112/65 - 131/75)  RR: 19 (12-03-19 @ 14:28) (18 - 19)  SpO2: 100% (12-03-19 @ 14:28) (94% - 100%)  Wt(kg): --  Height (cm): 162.6 (12-02-19 @ 00:10)  Weight (kg): 58.2 (12-02-19 @ 00:10)  BMI (kg/m2): 22 (12-02-19 @ 00:10)  BSA (m2): 1.62 (12-02-19 @ 00:10)      12-02-19 @ 07:01  -  12-03-19 @ 07:00  --------------------------------------------------------  IN: 1680 mL / OUT: 2900 mL / NET: -1220 mL    12-03-19 @ 07:01  -  12-03-19 @ 20:17  --------------------------------------------------------  IN: 700 mL / OUT: 0 mL / NET: 700 mL      Physical Exam:  	  	Gen: overall ill appearing chronic  	+ JVD  	Pulm: decrease bs  no wheezing coarse bs bases  	CV: RRR, S1S2; no rub  	Back: No CVA tenderness  	Abd: +BS, soft, nontender/nondistended  	: No suprapubic tenderness  	UE: Warm, no cyanosis  no clubbing,  no edema; no asterixis  	LE: Warm, no cyanosis  no clubbing, LLE 2+  edema  	Neuro: alert and oriented. speech coherent     	Vascular access: LUE avf + bruit and thrill         LABS/STUDIES  --------------------------------------------------------------------------------              8.6    4.51  >-----------<  185      [12-03-19 @ 05:54]              27.0     134  |  93  |  16  ----------------------------<  229      [12-03-19 @ 05:54]  4.9   |  25  |  3.82        Ca     8.9     [12-03-19 @ 05:54]      Mg     2.5     [12-02-19 @ 16:44]      Phos  7.3     [12-02-19 @ 16:44]            Creatinine Trend:  SCr 3.82 [12-03 @ 05:54]  SCr 5.44 [12-01 @ 18:16]  SCr 5.26 [12-01 @ 16:31]  SCr 4.85 [11-24 @ 02:49]  SCr 3.27 [11-14 @ 22:49]                  Iron 42, TIBC 253, %sat 17      [06-17-19 @ 17:54]  Ferritin 1838      [06-17-19 @ 18:06]  PTH -- (Ca 9.6)      [06-17-19 @ 18:06]   177  PTH -- (Ca 7.8)      [03-29-19 @ 20:38]   73  PTH -- (Ca 7.3)      [02-22-19 @ 02:49]   59  HbA1c 8.7      [11-13-19 @ 23:44]  TSH 1.78      [11-14-19 @ 09:15]  Lipid: chol 108, TG 76, HDL 57, LDL 36      [11-14-19 @ 09:16]

## 2019-12-03 NOTE — PROGRESS NOTE ADULT - SUBJECTIVE AND OBJECTIVE BOX
Cardiovascular Disease Progress Note    Overnight events: No acute events overnight.  less sob this am. no new cardiac sx  Otherwise review of systems negative    Objective Findings:  T(C): 36.6 (19 @ 04:41), Max: 36.9 (19 @ 23:15)  HR: 70 (19 @ 04:41) (70 - 98)  BP: 112/65 (19 @ 04:41) (112/65 - 138/69)  RR: 18 (19 @ 04:41) (18 - 18)  SpO2: 94% (19 @ 04:41) (94% - 100%)  Wt(kg): --  Daily     Daily Weight in k.3 (03 Dec 2019 04:41)      Physical Exam:  Gen: NAD  HEENT: EOMI  CV: RRR, normal S1 + S2, 2.6  Lungs: CTAB  Abd: soft, non-tender  Ext: trace edema    Telemetry: nsr, accelerated junctional rhythm    Laboratory Data:                        8.6    4.51  )-----------( 185      ( 03 Dec 2019 05:54 )             27.0     12    134<L>  |  93<L>  |  16  ----------------------------<  229<H>  4.9   |  25  |  3.82<H>    Ca    8.9      03 Dec 2019 05:54  Phos  7.3       Mg     2.5         TPro  8.1  /  Alb  4.6  /  TBili  0.2  /  DBili  x   /  AST  44<H>  /  ALT  34  /  AlkPhos  132<H>  12-              Inpatient Medications:  MEDICATIONS  (STANDING):  aspirin enteric coated 81 milliGRAM(s) Oral daily  atorvastatin 20 milliGRAM(s) Oral at bedtime  buDESOnide    Inhalation Suspension 0.5 milliGRAM(s) Inhalation every 12 hours  clopidogrel Tablet 75 milliGRAM(s) Oral daily  dextrose 5%. 1000 milliLiter(s) (50 mL/Hr) IV Continuous <Continuous>  dextrose 50% Injectable 12.5 Gram(s) IV Push once  dextrose 50% Injectable 25 Gram(s) IV Push once  dextrose 50% Injectable 25 Gram(s) IV Push once  hydrALAZINE 50 milliGRAM(s) Oral daily  hydrALAZINE 100 milliGRAM(s) Oral <User Schedule>  insulin glargine Injectable (LANTUS) 8 Unit(s) SubCutaneous at bedtime  insulin lispro (HumaLOG) corrective regimen sliding scale   SubCutaneous three times a day before meals  insulin lispro (HumaLOG) corrective regimen sliding scale   SubCutaneous at bedtime  insulin lispro Injectable (HumaLOG) 3 Unit(s) SubCutaneous three times a day before meals  lisinopril 40 milliGRAM(s) Oral daily  metoprolol succinate ER 50 milliGRAM(s) Oral daily  pantoprazole    Tablet 40 milliGRAM(s) Oral before breakfast  piperacillin/tazobactam IVPB.. 3.375 Gram(s) IV Intermittent every 12 hours  sevelamer carbonate 800 milliGRAM(s) Oral three times a day with meals      Assessment:  -volume overload  -SOB  -right lower lobe opacity  -elevated but stable cardiac enzymes (hs trop) with recent admission with relatively preserved ck/ck mb fraction and no obvious ischemic changes on ekg  -hx of NSVT  -pAT  -ischemic cardiomyopathy, cad s/p multiple stents  -esrd on hd  -PAD s/p prior intervention       Recs:  -optimization of volume status with hd. vq scan and venous duplex neg  -new rll opacity --> likely pna. pulm recs appreciated  -known extensive CAD and ICM. s/p [facility] 2019 --> severe and diffuse instent restenosis along RCA --> unable to stent due to multiple layers of stenting and prior brachytherapy. denies any true ischemic sx at present  -c/w beta blockers for nsvt/pat/cad (as above). currently on toprol 50mg, hydral and lisinopril uptitrate GDMT as tolerates. wouldnt inc bb at this time 2/2 hx of bronchospasm  -hx of prolonged qtc. avoid qtc prolonging meds  -s/p [facility] 2019: mod-severe MR, pseudo AS (low flow-low gradient), mod-severe LV dysfunction --> recent CTS consult with dr hebert appreciated. plan is for medical management given surgical risk and patient preference  -c/w asa, plavix, statin for ischemic cardiomyopathy/CAD/PAD  -dvt ppx        Over 25 minutes spent on total encounter; more than 50% of the visit was spent counseling and/or coordinating care by the attending physician.      Julián Biswas MD   Cardiovascular Disease  [phone]

## 2019-12-04 DIAGNOSIS — E78.5 HYPERLIPIDEMIA, UNSPECIFIED: ICD-10-CM

## 2019-12-04 DIAGNOSIS — E10.65 TYPE 1 DIABETES MELLITUS WITH HYPERGLYCEMIA: ICD-10-CM

## 2019-12-04 LAB
ANION GAP SERPL CALC-SCNC: 16 MMOL/L — SIGNIFICANT CHANGE UP (ref 5–17)
ANION GAP SERPL CALC-SCNC: 19 MMOL/L — HIGH (ref 5–17)
ANION GAP SERPL CALC-SCNC: 20 MMOL/L — HIGH (ref 5–17)
B-OH-BUTYR SERPL-SCNC: 2 MMOL/L — HIGH
BASE EXCESS BLDV CALC-SCNC: -2.1 MMOL/L — LOW (ref -2–2)
BUN SERPL-MCNC: 12 MG/DL — SIGNIFICANT CHANGE UP (ref 7–23)
BUN SERPL-MCNC: 33 MG/DL — HIGH (ref 7–23)
BUN SERPL-MCNC: 34 MG/DL — HIGH (ref 7–23)
CALCIUM SERPL-MCNC: 8.3 MG/DL — LOW (ref 8.4–10.5)
CALCIUM SERPL-MCNC: 8.6 MG/DL — SIGNIFICANT CHANGE UP (ref 8.4–10.5)
CALCIUM SERPL-MCNC: 8.8 MG/DL — SIGNIFICANT CHANGE UP (ref 8.4–10.5)
CALCIUM SERPL-MCNC: 9.1 MG/DL — SIGNIFICANT CHANGE UP (ref 8.4–10.5)
CHLORIDE SERPL-SCNC: 86 MMOL/L — LOW (ref 96–108)
CHLORIDE SERPL-SCNC: 86 MMOL/L — LOW (ref 96–108)
CHLORIDE SERPL-SCNC: 95 MMOL/L — LOW (ref 96–108)
CO2 BLDV-SCNC: 25 MMOL/L — SIGNIFICANT CHANGE UP (ref 22–30)
CO2 SERPL-SCNC: 22 MMOL/L — SIGNIFICANT CHANGE UP (ref 22–31)
CO2 SERPL-SCNC: 23 MMOL/L — SIGNIFICANT CHANGE UP (ref 22–31)
CO2 SERPL-SCNC: 27 MMOL/L — SIGNIFICANT CHANGE UP (ref 22–31)
CREAT SERPL-MCNC: 2.62 MG/DL — HIGH (ref 0.5–1.3)
CREAT SERPL-MCNC: 5.48 MG/DL — HIGH (ref 0.5–1.3)
CREAT SERPL-MCNC: 5.56 MG/DL — HIGH (ref 0.5–1.3)
FERRITIN SERPL-MCNC: 1021 NG/ML — HIGH (ref 15–150)
GLUCOSE BLDC GLUCOMTR-MCNC: 105 MG/DL — HIGH (ref 70–99)
GLUCOSE BLDC GLUCOMTR-MCNC: 180 MG/DL — HIGH (ref 70–99)
GLUCOSE BLDC GLUCOMTR-MCNC: 185 MG/DL — HIGH (ref 70–99)
GLUCOSE BLDC GLUCOMTR-MCNC: 208 MG/DL — HIGH (ref 70–99)
GLUCOSE BLDC GLUCOMTR-MCNC: 338 MG/DL — HIGH (ref 70–99)
GLUCOSE BLDC GLUCOMTR-MCNC: 439 MG/DL — HIGH (ref 70–99)
GLUCOSE BLDC GLUCOMTR-MCNC: 472 MG/DL — CRITICAL HIGH (ref 70–99)
GLUCOSE BLDC GLUCOMTR-MCNC: 482 MG/DL — CRITICAL HIGH (ref 70–99)
GLUCOSE BLDC GLUCOMTR-MCNC: 513 MG/DL — CRITICAL HIGH (ref 70–99)
GLUCOSE BLDC GLUCOMTR-MCNC: 515 MG/DL — CRITICAL HIGH (ref 70–99)
GLUCOSE BLDC GLUCOMTR-MCNC: 53 MG/DL — LOW (ref 70–99)
GLUCOSE BLDC GLUCOMTR-MCNC: 533 MG/DL — CRITICAL HIGH (ref 70–99)
GLUCOSE BLDC GLUCOMTR-MCNC: 571 MG/DL — CRITICAL HIGH (ref 70–99)
GLUCOSE BLDC GLUCOMTR-MCNC: 593 MG/DL — CRITICAL HIGH (ref 70–99)
GLUCOSE BLDC GLUCOMTR-MCNC: 81 MG/DL — SIGNIFICANT CHANGE UP (ref 70–99)
GLUCOSE BLDC GLUCOMTR-MCNC: 81 MG/DL — SIGNIFICANT CHANGE UP (ref 70–99)
GLUCOSE BLDC GLUCOMTR-MCNC: 94 MG/DL — SIGNIFICANT CHANGE UP (ref 70–99)
GLUCOSE SERPL-MCNC: 560 MG/DL — CRITICAL HIGH (ref 70–99)
GLUCOSE SERPL-MCNC: 598 MG/DL — CRITICAL HIGH (ref 70–99)
GLUCOSE SERPL-MCNC: 86 MG/DL — SIGNIFICANT CHANGE UP (ref 70–99)
HCO3 BLDV-SCNC: 24 MMOL/L — SIGNIFICANT CHANGE UP (ref 21–29)
HCT VFR BLD CALC: 27.2 % — LOW (ref 34.5–45)
HGB BLD-MCNC: 8.5 G/DL — LOW (ref 11.5–15.5)
IRON SATN MFR SERPL: 29 % — SIGNIFICANT CHANGE UP (ref 14–50)
IRON SATN MFR SERPL: 67 UG/DL — SIGNIFICANT CHANGE UP (ref 30–160)
LACTATE BLDV-MCNC: 1.8 MMOL/L — SIGNIFICANT CHANGE UP (ref 0.7–2)
MAGNESIUM SERPL-MCNC: 2.3 MG/DL — SIGNIFICANT CHANGE UP (ref 1.6–2.6)
MCHC RBC-ENTMCNC: 31.3 GM/DL — LOW (ref 32–36)
MCHC RBC-ENTMCNC: 32.9 PG — SIGNIFICANT CHANGE UP (ref 27–34)
MCV RBC AUTO: 105.4 FL — HIGH (ref 80–100)
NRBC # BLD: 0 /100 WBCS — SIGNIFICANT CHANGE UP (ref 0–0)
PCO2 BLDV: 48 MMHG — SIGNIFICANT CHANGE UP (ref 35–50)
PH BLDV: 7.32 — LOW (ref 7.35–7.45)
PHOSPHATE SERPL-MCNC: 7 MG/DL — HIGH (ref 2.5–4.5)
PLATELET # BLD AUTO: 182 K/UL — SIGNIFICANT CHANGE UP (ref 150–400)
PO2 BLDV: 30 MMHG — SIGNIFICANT CHANGE UP (ref 25–45)
POTASSIUM SERPL-MCNC: 4.6 MMOL/L — SIGNIFICANT CHANGE UP (ref 3.5–5.3)
POTASSIUM SERPL-MCNC: 6.2 MMOL/L — CRITICAL HIGH (ref 3.5–5.3)
POTASSIUM SERPL-MCNC: 6.4 MMOL/L — CRITICAL HIGH (ref 3.5–5.3)
POTASSIUM SERPL-SCNC: 4.6 MMOL/L — SIGNIFICANT CHANGE UP (ref 3.5–5.3)
POTASSIUM SERPL-SCNC: 6.2 MMOL/L — CRITICAL HIGH (ref 3.5–5.3)
POTASSIUM SERPL-SCNC: 6.4 MMOL/L — CRITICAL HIGH (ref 3.5–5.3)
PTH-INTACT FLD-MCNC: 952 PG/ML — HIGH (ref 15–65)
RBC # BLD: 2.58 M/UL — LOW (ref 3.8–5.2)
RBC # FLD: 13.8 % — SIGNIFICANT CHANGE UP (ref 10.3–14.5)
SAO2 % BLDV: 44 % — LOW (ref 67–88)
SODIUM SERPL-SCNC: 127 MMOL/L — LOW (ref 135–145)
SODIUM SERPL-SCNC: 129 MMOL/L — LOW (ref 135–145)
SODIUM SERPL-SCNC: 137 MMOL/L — SIGNIFICANT CHANGE UP (ref 135–145)
TIBC SERPL-MCNC: 234 UG/DL — SIGNIFICANT CHANGE UP (ref 220–430)
UIBC SERPL-MCNC: 167 UG/DL — SIGNIFICANT CHANGE UP (ref 110–370)
WBC # BLD: 5.24 K/UL — SIGNIFICANT CHANGE UP (ref 3.8–10.5)
WBC # FLD AUTO: 5.24 K/UL — SIGNIFICANT CHANGE UP (ref 3.8–10.5)

## 2019-12-04 PROCEDURE — 99223 1ST HOSP IP/OBS HIGH 75: CPT

## 2019-12-04 PROCEDURE — 99232 SBSQ HOSP IP/OBS MODERATE 35: CPT

## 2019-12-04 PROCEDURE — 93010 ELECTROCARDIOGRAM REPORT: CPT

## 2019-12-04 PROCEDURE — 99223 1ST HOSP IP/OBS HIGH 75: CPT | Mod: GC

## 2019-12-04 RX ORDER — INSULIN HUMAN 100 [IU]/ML
8 INJECTION, SOLUTION SUBCUTANEOUS ONCE
Refills: 0 | Status: COMPLETED | OUTPATIENT
Start: 2019-12-04 | End: 2019-12-04

## 2019-12-04 RX ORDER — INSULIN LISPRO 100/ML
VIAL (ML) SUBCUTANEOUS
Refills: 0 | Status: DISCONTINUED | OUTPATIENT
Start: 2019-12-04 | End: 2019-12-09

## 2019-12-04 RX ORDER — INSULIN LISPRO 100/ML
4 VIAL (ML) SUBCUTANEOUS
Refills: 0 | Status: DISCONTINUED | OUTPATIENT
Start: 2019-12-04 | End: 2019-12-09

## 2019-12-04 RX ORDER — INSULIN GLARGINE 100 [IU]/ML
6 INJECTION, SOLUTION SUBCUTANEOUS EVERY MORNING
Refills: 0 | Status: DISCONTINUED | OUTPATIENT
Start: 2019-12-04 | End: 2019-12-04

## 2019-12-04 RX ORDER — DEXTROSE 50 % IN WATER 50 %
25 SYRINGE (ML) INTRAVENOUS ONCE
Refills: 0 | Status: COMPLETED | OUTPATIENT
Start: 2019-12-04 | End: 2019-12-04

## 2019-12-04 RX ORDER — DEXTROSE 50 % IN WATER 50 %
25 SYRINGE (ML) INTRAVENOUS ONCE
Refills: 0 | Status: DISCONTINUED | OUTPATIENT
Start: 2019-12-04 | End: 2019-12-06

## 2019-12-04 RX ORDER — SODIUM ZIRCONIUM CYCLOSILICATE 10 G/10G
10 POWDER, FOR SUSPENSION ORAL ONCE
Refills: 0 | Status: DISCONTINUED | OUTPATIENT
Start: 2019-12-04 | End: 2019-12-04

## 2019-12-04 RX ORDER — CALCIUM GLUCONATE 100 MG/ML
1 VIAL (ML) INTRAVENOUS
Refills: 0 | Status: DISCONTINUED | OUTPATIENT
Start: 2019-12-04 | End: 2019-12-04

## 2019-12-04 RX ORDER — INSULIN GLARGINE 100 [IU]/ML
10 INJECTION, SOLUTION SUBCUTANEOUS AT BEDTIME
Refills: 0 | Status: DISCONTINUED | OUTPATIENT
Start: 2019-12-04 | End: 2019-12-05

## 2019-12-04 RX ORDER — CALCIUM GLUCONATE 100 MG/ML
1 VIAL (ML) INTRAVENOUS ONCE
Refills: 0 | Status: COMPLETED | OUTPATIENT
Start: 2019-12-04 | End: 2019-12-04

## 2019-12-04 RX ORDER — ACETAMINOPHEN 500 MG
650 TABLET ORAL ONCE
Refills: 0 | Status: COMPLETED | OUTPATIENT
Start: 2019-12-04 | End: 2019-12-04

## 2019-12-04 RX ORDER — INSULIN GLARGINE 100 [IU]/ML
12 INJECTION, SOLUTION SUBCUTANEOUS AT BEDTIME
Refills: 0 | Status: DISCONTINUED | OUTPATIENT
Start: 2019-12-04 | End: 2019-12-04

## 2019-12-04 RX ORDER — IPRATROPIUM/ALBUTEROL SULFATE 18-103MCG
3 AEROSOL WITH ADAPTER (GRAM) INHALATION EVERY 6 HOURS
Refills: 0 | Status: DISCONTINUED | OUTPATIENT
Start: 2019-12-04 | End: 2019-12-09

## 2019-12-04 RX ORDER — INSULIN HUMAN 100 [IU]/ML
5 INJECTION, SOLUTION SUBCUTANEOUS ONCE
Refills: 0 | Status: COMPLETED | OUTPATIENT
Start: 2019-12-04 | End: 2019-12-04

## 2019-12-04 RX ORDER — CEFEPIME 1 G/1
1000 INJECTION, POWDER, FOR SOLUTION INTRAMUSCULAR; INTRAVENOUS DAILY
Refills: 0 | Status: DISCONTINUED | OUTPATIENT
Start: 2019-12-05 | End: 2019-12-05

## 2019-12-04 RX ORDER — LIDOCAINE 4 G/100G
1 CREAM TOPICAL ONCE
Refills: 0 | Status: COMPLETED | OUTPATIENT
Start: 2019-12-04 | End: 2019-12-04

## 2019-12-04 RX ADMIN — INSULIN HUMAN 8 UNIT(S): 100 INJECTION, SOLUTION SUBCUTANEOUS at 08:40

## 2019-12-04 RX ADMIN — Medication 3 MILLILITER(S): at 23:31

## 2019-12-04 RX ADMIN — CLOPIDOGREL BISULFATE 75 MILLIGRAM(S): 75 TABLET, FILM COATED ORAL at 14:02

## 2019-12-04 RX ADMIN — Medication 3 MILLILITER(S): at 17:25

## 2019-12-04 RX ADMIN — ERYTHROPOIETIN 10000 UNIT(S): 10000 INJECTION, SOLUTION INTRAVENOUS; SUBCUTANEOUS at 10:41

## 2019-12-04 RX ADMIN — Medication 650 MILLIGRAM(S): at 02:27

## 2019-12-04 RX ADMIN — INSULIN GLARGINE 10 UNIT(S): 100 INJECTION, SOLUTION SUBCUTANEOUS at 21:30

## 2019-12-04 RX ADMIN — Medication 650 MILLIGRAM(S): at 01:33

## 2019-12-04 RX ADMIN — SEVELAMER CARBONATE 800 MILLIGRAM(S): 2400 POWDER, FOR SUSPENSION ORAL at 17:23

## 2019-12-04 RX ADMIN — PANTOPRAZOLE SODIUM 40 MILLIGRAM(S): 20 TABLET, DELAYED RELEASE ORAL at 06:14

## 2019-12-04 RX ADMIN — Medication 2: at 17:26

## 2019-12-04 RX ADMIN — Medication 25 GRAM(S): at 13:00

## 2019-12-04 RX ADMIN — SEVELAMER CARBONATE 800 MILLIGRAM(S): 2400 POWDER, FOR SUSPENSION ORAL at 14:02

## 2019-12-04 RX ADMIN — OXYCODONE AND ACETAMINOPHEN 1 TABLET(S): 5; 325 TABLET ORAL at 23:31

## 2019-12-04 RX ADMIN — LISINOPRIL 40 MILLIGRAM(S): 2.5 TABLET ORAL at 06:14

## 2019-12-04 RX ADMIN — Medication 50 MILLIGRAM(S): at 06:14

## 2019-12-04 RX ADMIN — LIDOCAINE 1 PATCH: 4 CREAM TOPICAL at 14:49

## 2019-12-04 RX ADMIN — Medication 4 UNIT(S): at 17:26

## 2019-12-04 RX ADMIN — INSULIN HUMAN 5 UNIT(S): 100 INJECTION, SOLUTION SUBCUTANEOUS at 07:37

## 2019-12-04 RX ADMIN — Medication 260 MILLIGRAM(S): at 07:33

## 2019-12-04 RX ADMIN — LIDOCAINE 1 PATCH: 4 CREAM TOPICAL at 07:00

## 2019-12-04 RX ADMIN — PIPERACILLIN AND TAZOBACTAM 25 GRAM(S): 4; .5 INJECTION, POWDER, LYOPHILIZED, FOR SOLUTION INTRAVENOUS at 22:17

## 2019-12-04 RX ADMIN — Medication 0.5 MILLIGRAM(S): at 06:14

## 2019-12-04 RX ADMIN — Medication 650 MILLIGRAM(S): at 08:03

## 2019-12-04 RX ADMIN — Medication 100 GRAM(S): at 07:49

## 2019-12-04 RX ADMIN — OXYCODONE AND ACETAMINOPHEN 1 TABLET(S): 5; 325 TABLET ORAL at 10:06

## 2019-12-04 RX ADMIN — Medication 81 MILLIGRAM(S): at 14:01

## 2019-12-04 RX ADMIN — OXYCODONE AND ACETAMINOPHEN 1 TABLET(S): 5; 325 TABLET ORAL at 22:17

## 2019-12-04 RX ADMIN — Medication 0.5 MILLIGRAM(S): at 17:23

## 2019-12-04 RX ADMIN — OXYCODONE AND ACETAMINOPHEN 1 TABLET(S): 5; 325 TABLET ORAL at 10:35

## 2019-12-04 RX ADMIN — Medication 4: at 21:31

## 2019-12-04 RX ADMIN — LIDOCAINE 1 PATCH: 4 CREAM TOPICAL at 01:31

## 2019-12-04 RX ADMIN — ATORVASTATIN CALCIUM 20 MILLIGRAM(S): 80 TABLET, FILM COATED ORAL at 21:32

## 2019-12-04 RX ADMIN — PIPERACILLIN AND TAZOBACTAM 25 GRAM(S): 4; .5 INJECTION, POWDER, LYOPHILIZED, FOR SOLUTION INTRAVENOUS at 14:12

## 2019-12-04 RX ADMIN — Medication 100 MILLIGRAM(S): at 17:23

## 2019-12-04 NOTE — CONSULT NOTE ADULT - SUBJECTIVE AND OBJECTIVE BOX
Reason For Consult: DM    HPI:  63 yo woman with CAD (Cath Feb '19 showing 99% RCA, 100% RPLS, 100% Cx) s/p multiple stents/brachytherapy, ESRD (on HD M/T/T/Sa), DM1 c/b neuropathy and retinopathy, CHF (EF 30% per last [facility]), mod MR, severe AS, RHF, TIA, severe PVD s/p b/l fempop bypass c/b left thrombosed graft, left subclavian vein stenosis s/p stent, COPD not on O2, gout, hilar lymphadenopathy of unknown etiology, frequent hospitalizations (usually 1-3 times a month) presenting with  SOB of 1 day.     endocrine called for DM assistance today after marked hyperglycemia inpt   Patient follows with endocrinologist Dr Ley.   Patient takes Lantus 12 U at night and Humalog 3-5U before meals.   pt is not able to take part in meaningful hx, hx per chart review and discussion with nurse/team.      Has had mulitple admissions for  DKA in the past. Is UTD with opthalmology and goes every 3 months. On HD 4 days per week. No podiatry. States she tries to moderate her intake of carbs. Patient used to be on an insulin pump in the past but was not able to manage it appropriately.     while inpt pt has been recieveing Lantus 12 units and humalog 4 units with meals.   this am with marked hyperglycemia (pt unable to tell if she snacked overnight)  was given 13 of regular insulin IV total for concern for DKA, Glucose went to 50s post HD requiring amp of dextorse now 90s post lunch without having received premeal insulin.      PAST MEDICAL & SURGICAL HISTORY:  COPD (chronic obstructive pulmonary disease)  Localized enlarged lymph nodes  CHF (congestive heart failure): EF 40-45%  Subclavian vein stenosis, left: s/p stent  DKA, type 1: 1/2015  ACS (acute coronary syndrome): 1/2015 - cath revealed 100% ostial stenosis not amenable to PCI - medical management  TIA (transient ischemic attack): x 2 - 8-9 years ago prior to ASD/VSD repair  CAD (coronary artery disease): s/p stents  Gout: past  CVA (cerebral infarction): with no residual, 8 yrs ago, prior to heart surgery - ST memory loss  Peripheral vascular disease: occluded left fem-pop bypass 5/2015  Diabetes mellitus type 1: Insulin Dependent -  ESRD (end stage renal disease): dialysis  M, tue, thursday, saturday  Hyperlipidemia  Status post device closure of ASD: &quot;[place]&quot;  History of cardiac catheterization: 1/2015 - no intervention  S/P femoral-popliteal bypass surgery: L and R in 2013 with graft; 5/2015 CFA angioplasty left and ileofemoral endarterectomywith vein patch angioplasty of left fem-pop bypass graft  Multiple vascular surgery both leg, left fempop bypass revision 11/2015  AV (arteriovenous fistula): Left AV graft; revision with stent placement 2-3 years ago  S/P cholecystectomy      FAMILY HISTORY:  Family history of smoking  Family history of hypertension  Family history of cancer (Sibling)        Social History: Lives with . + former tobacco use    Outpatient Medications:  · 	NovoLOG FlexPen 100 units/mL injectable solution: Last Dose Taken:  , 4 unit(s) injectable 3 times a day  · 	atorvastatin 40 mg oral tablet: Last Dose Taken:  , 1 tab(s) orally once a day (at bedtime)  · 	metoprolol succinate 50 mg oral tablet, extended release: Last Dose Taken:  , 1 tab(s) orally once a day  · 	sevelamer carbonate 800 mg oral tablet: Last Dose Taken:  , 1 tab(s) orally 3 times a day (with meals)  · 	budesonide 0.5 mg/2 mL inhalation suspension: Last Dose Taken:  , 2 milliliter(s) by nebulizer 2 times a day   · 	ipratropium-albuterol 0.5 mg-2.5 mg/3 mLinhalation solution: Last Dose Taken:  , 3 milliliter(s) inhaled every 6 hours as needed for wheezing/shortness of breath  · 	docusate sodium 100 mg oral capsule: Last Dose Taken:  , 1 cap(s) orally 3 times a day  · 	senna oral tablet: Last Dose Taken:  , 2 tab(s) orally once a day (at bedtime), As needed, Constipation  · 	isosorbide dinitrate 10 mg oral tablet: Last Dose Taken:  , 1 tab(s) orally 3 times a day  · 	Multiple Vitamins oral tablet: Last Dose Taken:  , 1 tab(s) orally once a day  · 	hydrALAZINE 25 mg oral tablet: Last Dose Taken:  , 1 tab(s) orally 3 times a day  · 	clopidogrel 75 mg oral tablet: Last Dose Taken:  , 1 tab(s) orally once a day  · 	aspirin 81 mg oral delayed release tablet: Last Dose Taken:  , 1 tab(s) orally once a day  · 	Percocet 5/325 oral tablet: Last Dose Taken:  , 1 tab(s) orally 2 times a day  · 	pantoprazole 40 mg oral delayed release tablet: Last Dose Taken:  , 1 tab(s) orally once a day  · 	Lantus 100 units/mL subcutaneous solution: Last Dose Taken:  , 12 unit(s) subcutaneous once a day (at bedtime)    MEDICATIONS  (STANDING):  albuterol/ipratropium for Nebulization 3 milliLiter(s) Nebulizer every 6 hours  aspirin enteric coated 81 milliGRAM(s) Oral daily  atorvastatin 20 milliGRAM(s) Oral at bedtime  buDESOnide    Inhalation Suspension 0.5 milliGRAM(s) Inhalation every 12 hours  clopidogrel Tablet 75 milliGRAM(s) Oral daily  dextrose 5%. 1000 milliLiter(s) (50 mL/Hr) IV Continuous <Continuous>  dextrose 50% Injectable 12.5 Gram(s) IV Push once  dextrose 50% Injectable 25 Gram(s) IV Push once  dextrose 50% Injectable 25 Gram(s) IV Push once  dextrose 50% Injectable 25 Gram(s) IV Push once  heparin  Injectable 5000 Unit(s) SubCutaneous every 12 hours  hydrALAZINE 50 milliGRAM(s) Oral daily  hydrALAZINE 100 milliGRAM(s) Oral <User Schedule>  insulin glargine Injectable (LANTUS) 6 Unit(s) SubCutaneous every morning  insulin glargine Injectable (LANTUS) 12 Unit(s) SubCutaneous at bedtime  insulin lispro (HumaLOG) corrective regimen sliding scale   SubCutaneous three times a day before meals  insulin lispro (HumaLOG) corrective regimen sliding scale   SubCutaneous at bedtime  insulin lispro Injectable (HumaLOG) 4 Unit(s) SubCutaneous three times a day before meals  lisinopril 40 milliGRAM(s) Oral daily  metoprolol succinate ER 50 milliGRAM(s) Oral daily  pantoprazole    Tablet 40 milliGRAM(s) Oral before breakfast  piperacillin/tazobactam IVPB.. 3.375 Gram(s) IV Intermittent every 12 hours  sevelamer carbonate 800 milliGRAM(s) Oral three times a day with meals    MEDICATIONS  (PRN):  acetaminophen   Tablet .. 650 milliGRAM(s) Oral every 6 hours PRN Mild Pain (1 - 3), Moderate Pain (4 - 6)  dextrose 40% Gel 15 Gram(s) Oral once PRN Blood Glucose LESS THAN 70 milliGRAM(s)/deciliter  glucagon  Injectable 1 milliGRAM(s) IntraMuscular once PRN Glucose LESS THAN 70 milligrams/deciliter  oxycodone    5 mG/acetaminophen 325 mG 1 Tablet(s) Oral every 12 hours PRN Severe Pain (7 - 10)      Allergies    No Known Allergies    Intolerances      Review of Systems:    UNABLE TO OBTAIN    PHYSICAL EXAM:  VITALS: T(C): 36.6 (12-04-19 @ 12:26)  T(F): 97.8 (12-04-19 @ 12:26), Max: 98.3 (12-04-19 @ 08:17)  HR: 73 (12-04-19 @ 13:53) (67 - 82)  BP: 126/62 (12-04-19 @ 13:53) (113/50 - 133/72)  RR:  (17 - 18)  SpO2:  (93% - 100%)  Wt(kg): --  GENERAL: NAD, elderly appearing female   EYES: No proptosis, no lid lag, anictericules  RESPIRATORY: Clear to auscultation bilaterally; No rales, rhonchi, wheezing, or rubs  CARDIOVASCULAR: Regular rate and rhythm; No murmurs; no peripheral edema  GI: Soft, nontender, non distended, normal bowel sounds  SKIN: Dry, intact, No rashes or lesions  MUSCULOSKELETAL: Full range of motion, normal strength  NEURO: sensation intact, extraocular movements intact, no tremor, normal reflexes  PSYCH: Alert and oriented x 3, normal affect, normal mood  CUSHING'S SIGNS: no striae    POCT Blood Glucose.: 94 mg/dL (12-04-19 @ 14:42)  POCT Blood Glucose.: 81 mg/dL (12-04-19 @ 13:52)  POCT Blood Glucose.: 81 mg/dL (12-04-19 @ 13:49)  POCT Blood Glucose.: 180 mg/dL (12-04-19 @ 12:56)  POCT Blood Glucose.: 53 mg/dL (12-04-19 @ 12:32)  POCT Blood Glucose.: 105 mg/dL (12-04-19 @ 10:52)  POCT Blood Glucose.: 472 mg/dL (12-04-19 @ 08:58)  POCT Blood Glucose.: 515 mg/dL (12-04-19 @ 08:54)  POCT Blood Glucose.: 571 mg/dL (12-04-19 @ 08:25)  POCT Blood Glucose.: 533 mg/dL (12-04-19 @ 08:23)  POCT Blood Glucose.: 513 mg/dL (12-04-19 @ 06:52)  POCT Blood Glucose.: 593 mg/dL (12-04-19 @ 06:50)  POCT Blood Glucose.: 251 mg/dL (12-03-19 @ 21:07)  POCT Blood Glucose.: 140 mg/dL (12-03-19 @ 17:28)  POCT Blood Glucose.: 186 mg/dL (12-03-19 @ 13:13)  POCT Blood Glucose.: 182 mg/dL (12-03-19 @ 08:37)  POCT Blood Glucose.: 175 mg/dL (12-02-19 @ 23:11)  POCT Blood Glucose.: 127 mg/dL (12-02-19 @ 17:43)  POCT Blood Glucose.: 86 mg/dL (12-02-19 @ 16:34)  POCT Blood Glucose.: 207 mg/dL (12-02-19 @ 12:35)  POCT Blood Glucose.: 164 mg/dL (12-02-19 @ 08:07)  POCT Blood Glucose.: 181 mg/dL (12-01-19 @ 21:25)                            8.5    5.24  )-----------( 182      ( 04 Dec 2019 05:56 )             27.2       12-04    137  |  95<L>  |  x   ----------------------------<  x   4.6   |  x   |  x     EGFR if : x   EGFR if non : x     Ca    8.8      12-04  Mg     2.3     12-04  Phos  7.0     12-04    TPro  8.1  /  Alb  4.6  /  TBili  0.2  /  DBili  x   /  AST  44<H>  /  ALT  34  /  AlkPhos  132<H>  12-01      Thyroid Function Tests:  11-14 @ 09:15 TSH 1.78 FreeT4 -- T3 -- Anti TPO -- Anti Thyroglobulin Ab -- TSI --      Hemoglobin A1C, Whole Blood: 8.7 % <H> [4.0 - 5.6] (11-13-19 @ 23:44)  Hemoglobin A1C, Whole Blood: 8.1 % <H> [4.0 - 5.6] (09-16-19 @ 08:04)      11-14 Chol 108 LDL 36 HDL 57 Trig 76    Radiology:

## 2019-12-04 NOTE — PROGRESS NOTE ADULT - ASSESSMENT
65 yo woman with CAD (Cath Feb '19 showing 99% RCA, 100% RPLS, 100% Cx) s/p multiple stents/brachytherapy, ESRD (on HD M/T/T/Sa), DM1 c/b neuropathy and retinopathy, CHF (EF 30% per last [facility]), mod MR, severe AS, RHF, TIA, severe PVD s/p b/l fempop bypass c/b left thrombosed graft, left subclavian vein stenosis s/p stent, COPD not on O2, gout, hilar lymphadenopathy of unknown etiology, frequent hospitalizations (usually 1-3 times a month) presented with  SOB of 1 day. had cxr revealing pneumonia.     1- ESRD  2- HTN   3- SHPT  4- Pneumonia    hd fr 180 3.5 hr 2.5 liter 2 k bath bfr 400 dfr 600   left foot x ray   see hd flow sheet

## 2019-12-04 NOTE — PHYSICAL THERAPY INITIAL EVALUATION ADULT - PERTINENT HX OF CURRENT PROBLEM, REHAB EVAL
Pt is a 65y/o female with PMHx of HLD, CAD, ESRD on HD M/W/F, HFrEF, severe AS, COPD, CVA, PVD s/p b/l fem pop bypass, hx hilar adenopathy, and T1DM (a1c 8.7) w/ multiple prior hospitalizations for DKA, who presented with SOB and was admitted with CAP. Reported that her left leg which is chronically swollen post bypass surgery also appeared very red & painful.

## 2019-12-04 NOTE — PROGRESS NOTE ADULT - SUBJECTIVE AND OBJECTIVE BOX
[facility]   [phone]  DIALYSIS NOTE  Chief Complaint: ESRD/Ongoing hemodialysis requirement.  today seen on hd    24 hour events/subjective:    c/o Left leg pain /foot pain         ALLERGIES & MEDICATIONS  --------------------------------------------------------------------------------  Allergies    No Known Allergies    Intolerances      Standing Inpatient Medications  albuterol/ipratropium for Nebulization 3 milliLiter(s) Nebulizer every 6 hours  aspirin enteric coated 81 milliGRAM(s) Oral daily  atorvastatin 20 milliGRAM(s) Oral at bedtime  buDESOnide    Inhalation Suspension 0.5 milliGRAM(s) Inhalation every 12 hours  clopidogrel Tablet 75 milliGRAM(s) Oral daily  dextrose 5%. 1000 milliLiter(s) IV Continuous <Continuous>  dextrose 50% Injectable 12.5 Gram(s) IV Push once  dextrose 50% Injectable 25 Gram(s) IV Push once  dextrose 50% Injectable 25 Gram(s) IV Push once  dextrose 50% Injectable 25 Gram(s) IV Push once  heparin  Injectable 5000 Unit(s) SubCutaneous every 12 hours  hydrALAZINE 50 milliGRAM(s) Oral daily  hydrALAZINE 100 milliGRAM(s) Oral <User Schedule>  insulin glargine Injectable (LANTUS) 10 Unit(s) SubCutaneous at bedtime  insulin lispro (HumaLOG) corrective regimen sliding scale   SubCutaneous <User Schedule>  insulin lispro (HumaLOG) corrective regimen sliding scale   SubCutaneous three times a day before meals  insulin lispro (HumaLOG) corrective regimen sliding scale   SubCutaneous at bedtime  insulin lispro Injectable (HumaLOG) 4 Unit(s) SubCutaneous three times a day before meals  lisinopril 40 milliGRAM(s) Oral daily  metoprolol succinate ER 50 milliGRAM(s) Oral daily  pantoprazole    Tablet 40 milliGRAM(s) Oral before breakfast  piperacillin/tazobactam IVPB.. 3.375 Gram(s) IV Intermittent every 12 hours  sevelamer carbonate 800 milliGRAM(s) Oral three times a day with meals    PRN Inpatient Medications  acetaminophen   Tablet .. 650 milliGRAM(s) Oral every 6 hours PRN  dextrose 40% Gel 15 Gram(s) Oral once PRN  glucagon  Injectable 1 milliGRAM(s) IntraMuscular once PRN  oxycodone    5 mG/acetaminophen 325 mG 1 Tablet(s) Oral every 12 hours PRN      REVIEW OF SYSTEMS  --------------------------------------------------------------------------------  no itching or rash  no fever or chill  no cp or palp   no sob or cough   no N/V/D/ no abd pain   ext + l;eft leg edema + left  pain         VITALS/PHYSICAL EXAM  --------------------------------------------------------------------------------  T(C): 36.7 (12-04-19 @ 20:04), Max: 36.8 (12-04-19 @ 07:00)  HR: 82 (12-04-19 @ 20:04) (67 - 98)  BP: 131/74 (12-04-19 @ 20:04) (113/50 - 133/72)  RR: 18 (12-04-19 @ 20:04) (17 - 18)  SpO2: 100% (12-04-19 @ 20:04) (93% - 100%)  Wt(kg): --        12-03-19 @ 07:01  -  12-04-19 @ 07:00  --------------------------------------------------------  IN: 1100 mL / OUT: 0 mL / NET: 1100 mL    12-04-19 @ 07:01  -  12-04-19 @ 20:20  --------------------------------------------------------  IN: 0 mL / OUT: 2000 mL / NET: -2000 mL      Physical Exam:  		  	+ JVD  	Pulm: decrease bs  no wheezing coarse bs bases  	CV: RRR, S1S2; no rub  	Back: No CVA tenderness  	Abd: +BS, soft, nontender/nondistended  	: No suprapubic tenderness  	UE: Warm, no cyanosis  no clubbing,  no edema; no asterixis  	LE: Warm, no cyanosis  no clubbing, LLE 2+  edema no erythema but warm left leg as well as tender first metatarsal and ankle/leg area   	Neuro: alert and oriented. speech coherent     	Vascular access: LUE avf + bruit and thrill   	    LABS/STUDIES  --------------------------------------------------------------------------------              8.5    5.24  >-----------<  182      [12-04-19 @ 05:56]              27.2     137  |  95  |  12  ----------------------------<  86      [12-04-19 @ 14:28]  4.6   |  27  |  2.62        Ca     9.1     [12-04-19 @ 14:28]      Mg     2.3     [12-04-19 @ 07:27]      Phos  7.0     [12-04-19 @ 05:55]                    imp/suggest: ESRD      Hemodialysis Prescription:  	Access:  	Dialyzer: revaclear   	Blood Flow (mL/Min): 400  	Dialysate Flow (mL/Min): 600  	Target UF (Liters):  	Treatment Time:  	Potassium:   	Calcium: 2.5  	  YOLANDA    Vitamin D     continue with hd   see hd flow sheet

## 2019-12-04 NOTE — PROVIDER CONTACT NOTE (OTHER) - REASON
FS = 593, immediate NE=735, done after NP spoke directly with , NP received critical blood glucose result and elevated K level

## 2019-12-04 NOTE — PROGRESS NOTE ADULT - SUBJECTIVE AND OBJECTIVE BOX
CC: f/u for [facility] PNA     REVIEW OF SYSTEMS:  somnolent - resting peacefully    Antimicrobials Day #  : 2  piperacillin/tazobactam IVPB.. 3.375 Gram(s) IV Intermittent every 12 hours    Other Medications Reviewed    T(F): 97.8 (12-04-19 @ 12:26), Max: 98.3 (12-04-19 @ 08:17)  HR: 73 (12-04-19 @ 13:53)  BP: 126/62 (12-04-19 @ 13:53)  RR: 18 (12-04-19 @ 12:26)  SpO2: 100% (12-04-19 @ 12:26)  Wt(kg): --    PHYSICAL EXAM:  General: alert, no acute distress  Eyes:  anicteric, no conjunctival injection, no discharge  Neck: supple  Lungs: right basilar rales  Extremities: LLE with 2+ edema, but neither red nor warm Neurologic: somnolent     LAB RESULTS:                        8.5    5.24  )-----------( 182      ( 04 Dec 2019 05:56 )             27.2     12-04    137  |  95<L>  |  12  ----------------------------<  86  4.6   |  27  |  2.62<H>    Ca    9.1      04 Dec 2019 14:28  Phos  7.0     12-04  Mg     2.3     12-04          MICROBIOLOGY:  RECENT CULTURES:        RADIOLOGY REVIEWED:  CT Chest No Cont (12.03.19 @ 23:13)   Bilateral lower lobe consolidations and volume loss may represent   areas of atelectasis, however cannot exclude superimposed infection.    Groundglass nodules within the bilateral upper lobes and right middle lobe are increased in size and more conspicuous from 2015 likely adenocarcinoma in situ spectrum lesions. Recommend continued follow-ups   or treatment.

## 2019-12-04 NOTE — CONSULT NOTE ADULT - ASSESSMENT
64F with CAD (Cath Feb '19 showing 99% RCA, 100% RPLS, 100% Cx) s/p multiple stents/brachytherapy, ESRD (on HD M/T/T/Sa), DM1 c/b neuropathy and retinopathy, CHF (EF 30% per last [facility]), mod MR, severe AS, RHF, TIA, severe PVD s/p b/l fempop bypass c/b left thrombosed graft, left subclavian vein stenosis s/p stent, COPD not on O2, gout, hilar lymphadenopathy of unknown etiology now admitted for pneumonia and cellulitis. Vascular surgery consulted for left foot pain and duplex findings.    - Duplex demonstrating patent bypass with distal PT occlusion  - Patient is otherwise neurovascularly intact with doppler signals DP b/l  - No vascular surgery intervention indicated  - Recommend LLE elevation for swelling and pain control as needed  - Rest of care per primary, please call w/ questions    To be d/w fellow Dr. Barbosa on behalf of Dr. Tonio Alfredo, PGY-2  Vascular Surgery  [id number] with questions 64F with CAD (Cath Feb '19 showing 99% RCA, 100% RPLS, 100% Cx) s/p multiple stents/brachytherapy, ESRD (on HD M/T/T/Sa), DM1 c/b neuropathy and retinopathy, CHF (EF 30% per last [facility]), mod MR, severe AS, RHF, TIA, severe PVD s/p b/l fempop bypass c/b left thrombosed graft, left subclavian vein stenosis s/p stent, COPD not on O2, gout, hilar lymphadenopathy of unknown etiology now admitted for pneumonia and cellulitis. Vascular surgery consulted for left foot pain and duplex findings.    - Duplex demonstrating patent bypass with distal PT occlusion  - Patient is otherwise neurovascularly intact with doppler signals DP b/l  - No vascular surgery intervention indicated  - Recommend LLE elevation for swelling and pain control as needed  - Rest of care per primary, please call w/ questions    d/w fellow Dr. Barbosa on behalf of Dr. Tonio Alfredo, PGY-2  Vascular Surgery  [phone] with questions

## 2019-12-04 NOTE — PROGRESS NOTE ADULT - SUBJECTIVE AND OBJECTIVE BOX
PULMONARY PROGRESS NOTE    NATHAN MEEKS  MRN-[id number]    Patient is a 64y old  Female who presents with a chief complaint of shortness of breath (02 Dec 2019 07:20)      HPI:  feeling better overall, cough with sputum.  upset that she was told she needs surgery on her leg.    ROS:   neg    MEDICATIONS  (STANDING):  aspirin enteric coated 81 milliGRAM(s) Oral daily  atorvastatin 20 milliGRAM(s) Oral at bedtime  buDESOnide    Inhalation Suspension 0.5 milliGRAM(s) Inhalation every 12 hours  clopidogrel Tablet 75 milliGRAM(s) Oral daily  dextrose 5%. 1000 milliLiter(s) (50 mL/Hr) IV Continuous <Continuous>  dextrose 50% Injectable 12.5 Gram(s) IV Push once  dextrose 50% Injectable 25 Gram(s) IV Push once  dextrose 50% Injectable 25 Gram(s) IV Push once  heparin  Injectable 5000 Unit(s) SubCutaneous every 12 hours  hydrALAZINE 50 milliGRAM(s) Oral daily  hydrALAZINE 100 milliGRAM(s) Oral <User Schedule>  insulin glargine Injectable (LANTUS) 6 Unit(s) SubCutaneous every morning  insulin glargine Injectable (LANTUS) 12 Unit(s) SubCutaneous at bedtime  insulin lispro (HumaLOG) corrective regimen sliding scale   SubCutaneous three times a day before meals  insulin lispro (HumaLOG) corrective regimen sliding scale   SubCutaneous at bedtime  insulin lispro Injectable (HumaLOG) 4 Unit(s) SubCutaneous three times a day before meals  lisinopril 40 milliGRAM(s) Oral daily  metoprolol succinate ER 50 milliGRAM(s) Oral daily  pantoprazole    Tablet 40 milliGRAM(s) Oral before breakfast  piperacillin/tazobactam IVPB.. 3.375 Gram(s) IV Intermittent every 12 hours  sevelamer carbonate 800 milliGRAM(s) Oral three times a day with meals    MEDICATIONS  (PRN):  acetaminophen   Tablet .. 650 milliGRAM(s) Oral every 6 hours PRN Mild Pain (1 - 3), Moderate Pain (4 - 6)  dextrose 40% Gel 15 Gram(s) Oral once PRN Blood Glucose LESS THAN 70 milliGRAM(s)/deciliter  glucagon  Injectable 1 milliGRAM(s) IntraMuscular once PRN Glucose LESS THAN 70 milligrams/deciliter  oxycodone    5 mG/acetaminophen 325 mG 1 Tablet(s) Oral every 12 hours PRN Severe Pain (7 - 10)        EXAM:  Vital Signs Last 24 Hrs  T(C): 36.6 (04 Dec 2019 08:55), Max: 36.8 (04 Dec 2019 07:00)  T(F): 97.8 (04 Dec 2019 08:55), Max: 98.3 (04 Dec 2019 08:17)  HR: 82 (04 Dec 2019 08:55) (69 - 82)  BP: 128/69 (04 Dec 2019 08:55) (113/50 - 133/72)  BP(mean): --  RR: 17 (04 Dec 2019 08:55) (17 - 19)  SpO2: 100% (04 Dec 2019 08:55) (93% - 100%)  GENERAL: The patient is awake and alert in no apparent distress.     LUNGS: unable to examine as she cannot get up bc having dialysis        LABS/IMAGING: reviewed                                   8.5    5.24  )-----------( 182      ( 04 Dec 2019 05:56 )             27.2   12-04    127<L>  |  86<L>  |  34<H>  ----------------------------<  598<HH>  6.4<HH>   |  22  |  5.56<H>    Ca    8.8      04 Dec 2019 07:27  Phos  7.0     12-04  Mg     2.3     12-04        < from: Xray Chest 2 Views PA/Lat (12.01.19 @ 17:35) >  IMPRESSION:   New right lower lobe opacity, likely pneumonia.    < end of copied text >  < from: NM Pulmonary Ventilation/Perfusion Scan (12.01.19 @ 22:58) >  IMPRESSION:     Very low probability of pulmonary embolus.    < end of copied text >      PROBLEM LIST:  64y Female with HEALTH ISSUES - PROBLEM Dx:  pneumonia  COPD  Diabetes mellitus type 1: Diabetes mellitus type 1  CAD (coronary artery disease): CAD (coronary artery disease)  ESRD (end stage renal disease): ESRD (end stage renal disease)  Chronic systolic congestive heart failure: Chronic systolic congestive heart failure  Thrombocytopenia: Thrombocytopenia  Lower extremity edema: Lower extremity edema    RECS:  -fu official ct chest report, looks like new RLL opacity.  Known to have multiple nodules but deemed not a surgical candidate.  -cont abx  -pulmicort bid, add brian  -appreciate cards eval  -renal fu, fluid removal      Alem Tate MD   [phone] PULMONARY PROGRESS NOTE    NATHAN MEEKS  MRN-[id number]    Patient is a 64y old  Female who presents with a chief complaint of shortness of breath (02 Dec 2019 07:20)      HPI:  feeling better overall, cough with sputum.  upset that she was told she needs surgery on her leg.    ROS:   neg    MEDICATIONS  (STANDING):  aspirin enteric coated 81 milliGRAM(s) Oral daily  atorvastatin 20 milliGRAM(s) Oral at bedtime  buDESOnide    Inhalation Suspension 0.5 milliGRAM(s) Inhalation every 12 hours  clopidogrel Tablet 75 milliGRAM(s) Oral daily  dextrose 5%. 1000 milliLiter(s) (50 mL/Hr) IV Continuous <Continuous>  dextrose 50% Injectable 12.5 Gram(s) IV Push once  dextrose 50% Injectable 25 Gram(s) IV Push once  dextrose 50% Injectable 25 Gram(s) IV Push once  heparin  Injectable 5000 Unit(s) SubCutaneous every 12 hours  hydrALAZINE 50 milliGRAM(s) Oral daily  hydrALAZINE 100 milliGRAM(s) Oral <User Schedule>  insulin glargine Injectable (LANTUS) 6 Unit(s) SubCutaneous every morning  insulin glargine Injectable (LANTUS) 12 Unit(s) SubCutaneous at bedtime  insulin lispro (HumaLOG) corrective regimen sliding scale   SubCutaneous three times a day before meals  insulin lispro (HumaLOG) corrective regimen sliding scale   SubCutaneous at bedtime  insulin lispro Injectable (HumaLOG) 4 Unit(s) SubCutaneous three times a day before meals  lisinopril 40 milliGRAM(s) Oral daily  metoprolol succinate ER 50 milliGRAM(s) Oral daily  pantoprazole    Tablet 40 milliGRAM(s) Oral before breakfast  piperacillin/tazobactam IVPB.. 3.375 Gram(s) IV Intermittent every 12 hours  sevelamer carbonate 800 milliGRAM(s) Oral three times a day with meals    MEDICATIONS  (PRN):  acetaminophen   Tablet .. 650 milliGRAM(s) Oral every 6 hours PRN Mild Pain (1 - 3), Moderate Pain (4 - 6)  dextrose 40% Gel 15 Gram(s) Oral once PRN Blood Glucose LESS THAN 70 milliGRAM(s)/deciliter  glucagon  Injectable 1 milliGRAM(s) IntraMuscular once PRN Glucose LESS THAN 70 milligrams/deciliter  oxycodone    5 mG/acetaminophen 325 mG 1 Tablet(s) Oral every 12 hours PRN Severe Pain (7 - 10)        EXAM:  Vital Signs Last 24 Hrs  T(C): 36.6 (04 Dec 2019 08:55), Max: 36.8 (04 Dec 2019 07:00)  T(F): 97.8 (04 Dec 2019 08:55), Max: 98.3 (04 Dec 2019 08:17)  HR: 82 (04 Dec 2019 08:55) (69 - 82)  BP: 128/69 (04 Dec 2019 08:55) (113/50 - 133/72)  BP(mean): --  RR: 17 (04 Dec 2019 08:55) (17 - 19)  SpO2: 100% (04 Dec 2019 08:55) (93% - 100%)  GENERAL: The patient is awake and alert in no apparent distress.     LUNGS: unable to examine as she cannot get up bc having dialysis        LABS/IMAGING: reviewed                                   8.5    5.24  )-----------( 182      ( 04 Dec 2019 05:56 )             27.2   12-04    127<L>  |  86<L>  |  34<H>  ----------------------------<  598<HH>  6.4<HH>   |  22  |  5.56<H>    Ca    8.8      04 Dec 2019 07:27  Phos  7.0     12-04  Mg     2.3     12-04        < from: Xray Chest 2 Views PA/Lat (12.01.19 @ 17:35) >  IMPRESSION:   New right lower lobe opacity, likely pneumonia.    < end of copied text >  < from: NM Pulmonary Ventilation/Perfusion Scan (12.01.19 @ 22:58) >  IMPRESSION:     Very low probability of pulmonary embolus.    < end of copied text >      PROBLEM LIST:  64y Female with HEALTH ISSUES - PROBLEM Dx:  pneumonia  COPD  Diabetes mellitus type 1: Diabetes mellitus type 1  CAD (coronary artery disease): CAD (coronary artery disease)  ESRD (end stage renal disease): ESRD (end stage renal disease)  Chronic systolic congestive heart failure: Chronic systolic congestive heart failure  Thrombocytopenia: Thrombocytopenia  Lower extremity edema: Lower extremity edema    RECS:  -fu official ct chest report, looks like new RLL opacity.  Known to have multiple nodule/lymphadenopathy, EBUS in 3/2019 showed reactive lymph nodes.  -cont abx  -pulmicort bid, add duonebs  -appreciate cards eval  -renal fu, fluid removal      Alem Tate MD   [phone]

## 2019-12-04 NOTE — PHYSICAL THERAPY INITIAL EVALUATION ADULT - CRITERIA FOR SKILLED THERAPEUTIC INTERVENTIONS
functional limitations in following categories/predicted duration of therapy intervention/anticipated discharge recommendation/impairments found/risk reduction/prevention/rehab potential/therapy frequency

## 2019-12-04 NOTE — CONSULT NOTE ADULT - ASSESSMENT
Patient is a 63y/o female with PMHx of HLD, CAD, ESRD on HD M/W/F, HFrEF, severe AS, COPD, CVA, PVD s/p b/l fem pop bypass, hx hilar adenopathy, and T1DM (a1c 8.7) w/ multiple prior hospitalizations for DKA, who presented with SOB and was admitted with CAP. MICU consulted for DKA.  This AM glucose of 500s, Anion gap 19, BPB 2.0 Patient is a 65y/o female with PMHx of HLD, CAD, ESRD on HD M/W/F, HFrEF, severe AS, COPD, CVA, PVD s/p b/l fem pop bypass, hx hilar adenopathy, and T1DM (a1c 8.7) w/ multiple prior hospitalizations for DKA, who presented with SOB and was admitted with CAP. MICU consulted for DKA.  This AM glucose of 500s, Anion gap 19, BPB 2.0, Venous pH 7.32, Bicarb 22. Pt was given 5u IV insulin: 571 -> 515 -> 472  Pt in mild DKA, likely to improve with dialysis   - continued FS Q2H as glucose will drop dialysis  - Repeat labs following dialysis: trend anion gap and hyperkalemia   - Followup endocrine recs for T1DM management  - Pt is not a MICU candidate at this time

## 2019-12-04 NOTE — DIETITIAN INITIAL EVALUATION ADULT. - ORAL INTAKE PTA
[address], [address] [place] [address]         Procedure:  Colonoscopy    :  Mert Menendez MD    Surgical Assistant: None    Implants: None    Referring Provider: Fred Hatfield DO    Sedation:  MAC anesthesia Propofol      Prior to the procedure its objectives, risks, consequences and alternatives were discussed with the patient who then elected to proceed. The patient had the opportunity to ask questions and those questions were answered. A physical exam was performed. The heart, lungs, and mental status were examined prior to the procedure and found to be satisfactory for conscious sedation and for the procedure. Conscious sedation was initiated by the physician. Continuous pulse oximetry and blood pressure monitoring were used throughout the procedure. After appropriate analgesia and rectal exam, the colonoscope was passed into the anus and passed to the cecum without difficulty. The prep was good. On slow withdrawal of the scope, the cecum, ascending, colon, hepatic flexure, transverse colon, splenic flexure and descending colon were normal. In the sigmoid there were mild diverticulosis. The rectum was normal. Retroflexion exam of the rectum was normal He tolerated the procedure without complication and I recommend a repeat colonoscopy in 5 years. Specimen Removed:  None    Complications: None. EBL:  None.         Mert Menendez MD  12/4/2019  9:32 AM
reports good appetite since adm/good

## 2019-12-04 NOTE — CHART NOTE - NSCHARTNOTEFT_GEN_A_CORE
MEDICINE NP     NATHAN MEEKS  64y Female  Patient is a 64y old  Female who presents with a chief complaint of shortness of breath (04 Dec 2019 08:41)       Event Summary:   Notified by RN patient with FS of 533, patient received 5 units of Humulin R at 7a and Calcium Gluconate  IV PB.  Instructed RN to give 8 units of Humulin R.  Endocrine consult called. ICU consult called due to elevated Glucose , beta hydroxy-Butyrate: 2.0   Anion gap of 19 .  ICU team will see patient.          Vital Signs Last 24 Hrs  T(C): 36.8 (04 Dec 2019 08:17), Max: 36.8 (04 Dec 2019 07:00)  T(F): 98.3 (04 Dec 2019 08:17), Max: 98.3 (04 Dec 2019 08:17)  HR: 71 (04 Dec 2019 08:17) (69 - 78)  BP: 133/72 (04 Dec 2019 08:17) (113/50 - 133/72)  BP(mean): --  RR: 18 (04 Dec 2019 08:17) (18 - 19)  SpO2: 95% (04 Dec 2019 08:17) (93% - 100%)        Plan:              Therese Miranda, MIGUEL ANGEL-C  Medicine Department MEDICINE NP     IFTIKHAR MEEKSARET  64y Female  Patient is a 64y old  Female who presents with a chief complaint of shortness of breath (04 Dec 2019 08:41)       Event Summary:   Notified by RN patient with FS of 533, patient received 5 units of Humulin R at 7a and Calcium Gluconate  IV PB.  Instructed RN to give 8 units of Humulin R.  Endocrine consult called. ICU consult called due to elevated Glucose , beta hydroxy-Butyrate: 2.0   Anion gap of 19 .  ICU team will see patient.    Spoke with Endocrine Team: instruction to start Lantus 12 Unit at night  and 6 unit am , give Humalog 4 units pre-meal.  Patient in HD at this time.    Vital Signs Last 24 Hrs  T(C): 36.8 (04 Dec 2019 08:17), Max: 36.8 (04 Dec 2019 07:00)  T(F): 98.3 (04 Dec 2019 08:17), Max: 98.3 (04 Dec 2019 08:17)  HR: 71 (04 Dec 2019 08:17) (69 - 78)  BP: 133/72 (04 Dec 2019 08:17) (113/50 - 133/72)  BP(mean): --  RR: 18 (04 Dec 2019 08:17) (18 - 19)  SpO2: 95% (04 Dec 2019 08:17) (93% - 100%)        Plan:              Therese Miranda DNP-C  Medicine Department

## 2019-12-04 NOTE — PROVIDER CONTACT NOTE (OTHER) - SITUATION
FS = 593, immediate QY=439, done after NP spoke directly with , NP received critical blood glucose result and elevated K level

## 2019-12-04 NOTE — PROGRESS NOTE ADULT - ASSESSMENT
· Assessment		  65 yo woman with PMH of CAD, DM1 c/b neuropathy and retinopathy, CHF (EF 30%), mod MR, severe AS, RHF, TIA, severe PVD s/p b/l fempop bypass c/b left thrombosed graft, left subclavian vein stenosis s/p stent, COPD not on O2, gout, hilar lymphadenopathy of unknown etiology, frequent hospitalizations (usually 1-3 times a month) p/w SOB, cough, LLE pain found with possible RLL PNA and LLE cellulitis    Shortness of breath.   - Right lower lung opacity: possible PNA.  - continue antibiotics. ID follow   - Pulmonary evaluation noted  -Check ambulatory saturation off supplemental O2.     Lower extremity edema.   - LE doppler negative for DVT  - Reference: # [id number] Last prescribed Percocet 5/325 in October 2019  - Percocet prn for pain O/N.   - Vascular evaluation  - Rheumatology evaluation    Thrombocytopenia.   - Thrombocytopenia on labs. Unclear if spurious result or true.  - Follow CBC with T&S     Chronic systolic congestive heart failure.  - Appears euvolemic.  - Continue with Lisinopril.   - Cardiology follow.    ESRD (end stage renal disease).   - Renal evaluation Dr. Schmidt  - HD    CAD (coronary artery disease).   -  Troponin elevated likely in setting of ESRD. Similar level to prior labs  - EKG unchanged  - Continue Hydralazine  - Per patient no longer takes Isosorbide Dinitrate   - Cardiology follow.    Diabetes mellitus type 1.  - Compliant with medications  - Continue with Humalog 3U TID premeal  - Continue with corrective SSI  - Continue with Lantus (will start with 8U tonight) as patient with latest glucose of 134.     Prophylactic measure.  -  DVT ppx: Hold Pharmacologic DVT in setting of new thrombocytopenia. SCDs    Fall risk protocol.   Pierce Viera MD pager [phone]

## 2019-12-04 NOTE — PHYSICAL THERAPY INITIAL EVALUATION ADULT - ADDITIONAL COMMENTS
PTA pt lived in pvt home with  and child/grandchildren, 3 steps to enter +HR, flight to bedroom +HR, fully independent without AD.

## 2019-12-04 NOTE — PROGRESS NOTE ADULT - SUBJECTIVE AND OBJECTIVE BOX
Cardiovascular Disease Progress Note    Overnight events: No acute events overnight.  c/o excruciating left leg pain, requesting pain meds. no new cardiac sx. + hyperkalemia  Otherwise review of systems negative    Objective Findings:  T(C): 36.8 (19 @ 07:00), Max: 36.8 (19 @ 07:00)  HR: 73 (19 @ 07:00) (69 - 78)  BP: 131/68 (19 @ 07:00) (113/50 - 131/68)  RR: 18 (19 @ 07:00) (18 - 19)  SpO2: 96% (19 @ 07:00) (93% - 100%)  Wt(kg): --  Daily     Daily Weight in k (04 Dec 2019 04:45)      Physical Exam:  Gen: NAD  HEENT: EOMI  CV: RRR, normal S1 + S2, no m/r/g  Lungs: CTAB  Abd: soft, non-tender  Ext: + edema    Telemetry: nsr    Laboratory Data:                        8.5    5.24  )-----------( 182      ( 04 Dec 2019 05:56 )             27.2     12-    129<L>  |  86<L>  |  33<H>  ----------------------------<  560<HH>  6.2<HH>   |  23  |  5.48<H>    Ca    8.6      04 Dec 2019 05:55  Phos  7.0     12-  Mg     2.5     12-                Inpatient Medications:  MEDICATIONS  (STANDING):  acetaminophen  IVPB .. 650 milliGRAM(s) IV Intermittent once  aspirin enteric coated 81 milliGRAM(s) Oral daily  atorvastatin 20 milliGRAM(s) Oral at bedtime  buDESOnide    Inhalation Suspension 0.5 milliGRAM(s) Inhalation every 12 hours  calcium gluconate IVPB 1 Gram(s) IV Intermittent every 1 hour  clopidogrel Tablet 75 milliGRAM(s) Oral daily  dextrose 5%. 1000 milliLiter(s) (50 mL/Hr) IV Continuous <Continuous>  dextrose 50% Injectable 12.5 Gram(s) IV Push once  dextrose 50% Injectable 25 Gram(s) IV Push once  dextrose 50% Injectable 25 Gram(s) IV Push once  epoetin azalea Injectable 79937 Unit(s) IV Push once  heparin  Injectable 5000 Unit(s) SubCutaneous every 12 hours  hydrALAZINE 50 milliGRAM(s) Oral daily  hydrALAZINE 100 milliGRAM(s) Oral <User Schedule>  insulin glargine Injectable (LANTUS) 8 Unit(s) SubCutaneous at bedtime  insulin lispro (HumaLOG) corrective regimen sliding scale   SubCutaneous three times a day before meals  insulin lispro (HumaLOG) corrective regimen sliding scale   SubCutaneous at bedtime  insulin lispro Injectable (HumaLOG) 3 Unit(s) SubCutaneous three times a day before meals  insulin regular  human recombinant. 5 Unit(s) IV Push once  lisinopril 40 milliGRAM(s) Oral daily  metoprolol succinate ER 50 milliGRAM(s) Oral daily  pantoprazole    Tablet 40 milliGRAM(s) Oral before breakfast  piperacillin/tazobactam IVPB.. 3.375 Gram(s) IV Intermittent every 12 hours  sevelamer carbonate 800 milliGRAM(s) Oral three times a day with meals  sodium zirconium cyclosilicate 10 Gram(s) Oral once      Assessment:  -volume overload  -SOB  -right lower lobe opacity  -elevated but stable cardiac enzymes (hs trop) with recent admission with relatively preserved ck/ck mb fraction and no obvious ischemic changes on ekg  -hx of NSVT  -pAT  -ischemic cardiomyopathy, cad s/p multiple stents  -esrd on hd  -PAD s/p prior intervention       Recs:  -optimization of volume status and electrolyte abnormalities with hd.   -vq scan and venous duplex neg  -cli sx. known pad. s/p art duplex. consider vascular eval. pain control  -new rll opacity --> likely pna. pulm recs appreciated. id consulted. plan for ct chest and cefepime  -known extensive CAD and ICM. s/p C 2019 --> severe and diffuse instent restenosis along RCA --> unable to stent due to multiple layers of stenting and prior brachytherapy. denies any true ischemic sx at present  -c/w beta blockers for nsvt/pat/cad (as above). currently on toprol 50mg, hydral and lisinopril uptitrate GDMT as tolerates. wouldnt inc bb at this time 2/2 hx of bronchospasm  -hx of prolonged qtc. avoid qtc prolonging meds  -s/p TTE 2019: mod-severe MR, pseudo AS (low flow-low gradient), mod-severe LV dysfunction --> recent CTS consult with dr hebert appreciated. plan is for medical management given surgical risk and patient preference  -c/w asa, plavix, statin for ischemic cardiomyopathy/CAD/PAD  -dvt ppx      Over 25 minutes spent on total encounter; more than 50% of the visit was spent counseling and/or coordinating care by the attending physician.      Julián Biswas MD   Cardiovascular Disease  [phone]

## 2019-12-04 NOTE — PHYSICAL THERAPY INITIAL EVALUATION ADULT - PRECAUTIONS/LIMITATIONS, REHAB EVAL
cardiac precautions/fall precautions/s/p TTE 2019: mod-severe MR, pseudo AS (low flow-low gradient), mod-severe LV dysfunction, as per CTS plan for medical management. Doppler dem RLE posterior tib artery occlusion.

## 2019-12-04 NOTE — PROGRESS NOTE ADULT - SUBJECTIVE AND OBJECTIVE BOX
Patient is a 64y old  Female who presents with a chief complaint of shortness of breath (04 Dec 2019 16:59)      SUBJECTIVE / OVERNIGHT EVENTS: c/o pain L leg  Review of Systems  chest pain no  palpitations no  sob no  nausea no  headache no    MEDICATIONS  (STANDING):  albuterol/ipratropium for Nebulization 3 milliLiter(s) Nebulizer every 6 hours  aspirin enteric coated 81 milliGRAM(s) Oral daily  atorvastatin 20 milliGRAM(s) Oral at bedtime  buDESOnide    Inhalation Suspension 0.5 milliGRAM(s) Inhalation every 12 hours  clopidogrel Tablet 75 milliGRAM(s) Oral daily  dextrose 5%. 1000 milliLiter(s) (50 mL/Hr) IV Continuous <Continuous>  dextrose 50% Injectable 12.5 Gram(s) IV Push once  dextrose 50% Injectable 25 Gram(s) IV Push once  dextrose 50% Injectable 25 Gram(s) IV Push once  dextrose 50% Injectable 25 Gram(s) IV Push once  heparin  Injectable 5000 Unit(s) SubCutaneous every 12 hours  hydrALAZINE 50 milliGRAM(s) Oral daily  hydrALAZINE 100 milliGRAM(s) Oral <User Schedule>  insulin glargine Injectable (LANTUS) 10 Unit(s) SubCutaneous at bedtime  insulin lispro (HumaLOG) corrective regimen sliding scale   SubCutaneous <User Schedule>  insulin lispro (HumaLOG) corrective regimen sliding scale   SubCutaneous three times a day before meals  insulin lispro (HumaLOG) corrective regimen sliding scale   SubCutaneous at bedtime  insulin lispro Injectable (HumaLOG) 4 Unit(s) SubCutaneous three times a day before meals  lisinopril 40 milliGRAM(s) Oral daily  metoprolol succinate ER 50 milliGRAM(s) Oral daily  pantoprazole    Tablet 40 milliGRAM(s) Oral before breakfast  piperacillin/tazobactam IVPB.. 3.375 Gram(s) IV Intermittent every 12 hours  sevelamer carbonate 800 milliGRAM(s) Oral three times a day with meals    MEDICATIONS  (PRN):  acetaminophen   Tablet .. 650 milliGRAM(s) Oral every 6 hours PRN Mild Pain (1 - 3), Moderate Pain (4 - 6)  dextrose 40% Gel 15 Gram(s) Oral once PRN Blood Glucose LESS THAN 70 milliGRAM(s)/deciliter  glucagon  Injectable 1 milliGRAM(s) IntraMuscular once PRN Glucose LESS THAN 70 milligrams/deciliter  oxycodone    5 mG/acetaminophen 325 mG 1 Tablet(s) Oral every 12 hours PRN Severe Pain (7 - 10)      Vital Signs Last 24 Hrs  T(C): 36.6 (04 Dec 2019 12:26), Max: 36.8 (04 Dec 2019 07:00)  T(F): 97.8 (04 Dec 2019 12:26), Max: 98.3 (04 Dec 2019 08:17)  HR: 98 (04 Dec 2019 17:28) (67 - 98)  BP: 117/65 (04 Dec 2019 17:28) (113/50 - 133/72)  BP(mean): --  RR: 18 (04 Dec 2019 12:26) (17 - 18)  SpO2: 100% (04 Dec 2019 12:26) (93% - 100%)    PHYSICAL EXAM:  GENERAL: NAD, well-developed  HEAD:  Atraumatic, Normocephalic  EYES: EOMI, PERRLA, conjunctiva and sclera clear  NECK: Supple, No JVD  CHEST/LUNG: Clear to auscultation bilaterally; No wheeze  HEART: Regular rate and rhythm; No murmurs, rubs, or gallops  ABDOMEN: Soft, Nontender, Nondistended; Bowel sounds present  EXTREMITIES:  2+ L leg edema, warm.  PSYCH: AAOx3  NEUROLOGY: non-focal  SKIN: No rashes or lesions    LABS:                        8.5    5.24  )-----------( 182      ( 04 Dec 2019 05:56 )             27.2     12-04    137  |  95<L>  |  12  ----------------------------<  86  4.6   |  27  |  2.62<H>    Ca    9.1      04 Dec 2019 14:28  Phos  7.0     12-04  Mg     2.3     12-04                  RADIOLOGY & ADDITIONAL TESTS:    Imaging Personally Reviewed:  < from: CT Chest No Cont (12.03.19 @ 23:13) >  IMPRESSION:     1.  Bilateral lower lobe consolidations and volume loss may represent   areas of atelectasis, however cannot exclude superimposed infection.    2.  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 Recommend continued follow-ups   or treatment.    < end of copied text >    Consultant(s) Notes Reviewed:      Care Discussed with Consultants/Other Providers:

## 2019-12-04 NOTE — PROGRESS NOTE ADULT - ASSESSMENT
64y female with HLD, CAD, ESRD on HD, CHF, severe AS, COPD, CVA, PVD s/p b/l fem pop bypass, hx hilar adenopathy, DM, prior hospitalizations for DKA, Parainfluenza multifocal pneumonia in Jun 2019, then RV/EV URI in Sep 2019, followed by post viral HCAP treated with cefepime.    Her last CXR on 11/24/19 had revealed clear lungs    Presented to the ER on 12/01/19 with c/o worsening SOB x 1 day & her left leg which is chronically swollen post bypass surgery also appeared very red & painful   Found afebrile & without leukocytosis  D dimer was elevated but V/Q scan with low probability of PE  Markedly elevated pBNP - 93088  CXR revealed a new RLL opacity concerning for PNA - started on zosyn & ID called.   Exam with right basilar rales & 2+ edema of LLE without support for ongoing cellulitis today.  Ct chest findings noted - pulmonary evaluation appreciated     PLAN:  Continue cefepime to limit fluid overload

## 2019-12-04 NOTE — CONSULT NOTE ADULT - SUBJECTIVE AND OBJECTIVE BOX
General Surgery Consult  Consulting surgical team: Vascular Surgery  Consulting attending: Lance Elizalde    HPI:  65 yo woman with CAD (Cath Feb '19 showing 99% RCA, 100% RPLS, 100% Cx) s/p multiple stents/brachytherapy, ESRD (on HD M/T/T/Sa), DM1 c/b neuropathy and retinopathy, CHF (EF 30% per last [facility]), mod MR, severe AS, RHF, TIA, severe PVD s/p b/l fempop bypass c/b left thrombosed graft, left subclavian vein stenosis s/p stent, COPD not on O2, gout, hilar lymphadenopathy of unknown etiology, frequent hospitalizations (usually 1-3 times a month) presenting with  SOB of 1 day. Patient states that she was walking in her home feeling short of breath. Denies wheezing. Denies associated pleuritic CP or chest pain. Denies palpitations. Endorses a productive cough that worsened with yellow sputum. States that she has been experiencing cough ~1 month. Denies rhinorrhea, nasal congestion, sore throat, fevers, chills, sweats. Denies sick contacts. Denies recent use of antibiotics. Patient also endorses increased left LE edema (chronically edematous from fem-pop bypass) today with worsening pain. States yesterday LE edema was at baseline. Denies trauma. Denies orthopnea or PND. Does not utilize diuretics. Last HD session was Saturday. (01 Dec 2019 21:16)    Patient was admitted for pneumonia and cellulitis. Yesterday started experiencing left foot pain and swelling, for which an arterial duplex was obtained showing a patent bypass vein graft with distal occlusion of PT artery. Vascular surgery was consulted for further recommendations. Patient states that her left foot becomes swollen often, associated with pain which she believes is related more to her diabetes.      PAST MEDICAL HISTORY:  COPD (chronic obstructive pulmonary disease)  Localized enlarged lymph nodes  CHF (congestive heart failure)  Subclavian vein stenosis, left  Cellulitis  DKA, type 1  ACS (acute coronary syndrome)  MI (myocardial infarction)  MI, old  TIA (transient ischemic attack)  PAD (peripheral artery disease)  HLD (hyperlipidemia)  HTN (hypertension)  ESRD (end stage renal disease) on dialysis  Type 1 diabetes mellitus  CVA (cerebral vascular accident)  CAD (coronary artery disease)  Myocardial infarct  Murmur, cardiac  Gout  CVA (cerebral infarction)  Peripheral vascular disease  Coronary artery disease  Diabetes mellitus type 1  ESRD (end stage renal disease)  TIA (transient ischemic attack)  x 3 2005 2 nt to VSD/ASD repair  Diabetes mellitus type II  HTN (hypertension), benign  Hyperlipidemia      PAST SURGICAL HISTORY:  Status post device closure of ASD  History of cardiac catheterization  S/P cholecystectomy  AV fistula  S/P femoral-popliteal bypass surgery  S/P femoral-popliteal bypass surgery  Stented coronary artery  AV (arteriovenous fistula)  S/P cholecystectomy  ASD OR VSD  Repair 2005      MEDICATIONS:  acetaminophen   Tablet .. 650 milliGRAM(s) Oral every 6 hours PRN  albuterol/ipratropium for Nebulization 3 milliLiter(s) Nebulizer every 6 hours  aspirin enteric coated 81 milliGRAM(s) Oral daily  atorvastatin 20 milliGRAM(s) Oral at bedtime  buDESOnide    Inhalation Suspension 0.5 milliGRAM(s) Inhalation every 12 hours  clopidogrel Tablet 75 milliGRAM(s) Oral daily  dextrose 40% Gel 15 Gram(s) Oral once PRN  dextrose 5%. 1000 milliLiter(s) IV Continuous <Continuous>  dextrose 50% Injectable 12.5 Gram(s) IV Push once  dextrose 50% Injectable 25 Gram(s) IV Push once  dextrose 50% Injectable 25 Gram(s) IV Push once  dextrose 50% Injectable 25 Gram(s) IV Push once  glucagon  Injectable 1 milliGRAM(s) IntraMuscular once PRN  heparin  Injectable 5000 Unit(s) SubCutaneous every 12 hours  hydrALAZINE 50 milliGRAM(s) Oral daily  hydrALAZINE 100 milliGRAM(s) Oral <User Schedule>  insulin glargine Injectable (LANTUS) 10 Unit(s) SubCutaneous at bedtime  insulin lispro (HumaLOG) corrective regimen sliding scale   SubCutaneous <User Schedule>  insulin lispro (HumaLOG) corrective regimen sliding scale   SubCutaneous three times a day before meals  insulin lispro (HumaLOG) corrective regimen sliding scale   SubCutaneous at bedtime  insulin lispro Injectable (HumaLOG) 4 Unit(s) SubCutaneous three times a day before meals  lisinopril 40 milliGRAM(s) Oral daily  metoprolol succinate ER 50 milliGRAM(s) Oral daily  oxycodone    5 mG/acetaminophen 325 mG 1 Tablet(s) Oral every 12 hours PRN  pantoprazole    Tablet 40 milliGRAM(s) Oral before breakfast  piperacillin/tazobactam IVPB.. 3.375 Gram(s) IV Intermittent every 12 hours  sevelamer carbonate 800 milliGRAM(s) Oral three times a day with meals      ALLERGIES:  No Known Allergies      VITALS & I/Os:  Vital Signs Last 24 Hrs  T(C): 36.7 (04 Dec 2019 20:04), Max: 36.8 (04 Dec 2019 07:00)  T(F): 98 (04 Dec 2019 20:04), Max: 98.3 (04 Dec 2019 08:17)  HR: 82 (04 Dec 2019 20:04) (67 - 98)  BP: 131/74 (04 Dec 2019 20:04) (113/50 - 133/72)  BP(mean): --  RR: 18 (04 Dec 2019 20:04) (17 - 18)  SpO2: 100% (04 Dec 2019 20:04) (93% - 100%)    I&O's Summary    03 Dec 2019 07:01  -  04 Dec 2019 07:00  --------------------------------------------------------  IN: 1100 mL / OUT: 0 mL / NET: 1100 mL    04 Dec 2019 07:01  -  04 Dec 2019 22:26  --------------------------------------------------------  IN: 0 mL / OUT: 2000 mL / NET: -2000 mL        PHYSICAL EXAM:  General: No acute distress  Respiratory: Nonlabored  Abdominal: Soft, nondistended, nontender  Extremities: Warm, motor/sensation intact  Vascular: +signal R DP, PT; +signal L DP    LABS:                        8.5    5.24  )-----------( 182      ( 04 Dec 2019 05:56 )             27.2     12-04    137  |  95<L>  |  12  ----------------------------<  86  4.6   |  27  |  2.62<H>    Ca    9.1      04 Dec 2019 14:28  Phos  7.0     12-04  Mg     2.3     12-04      Lactate:  12-04 @ 07:27  1.8    IMAGING:  < from: VA Duplex Low Ext Arterial, Ltd, Left (12.03.19 @ 11:39) >  EXAM:  DUPLEX LOW ARTERIES UNI LTD LT                            PROCEDURE DATE:  12/03/2019      INTERPRETATION:  Clinical information: Ischemic left lower extremity,   with arterial vascular repair.    Technique: Grayscale, color and spectral Doppler imaging was performed at   the arteries of the Left lower extremity.    Findings:    There is an occluded synthetic femoral-popliteal bypass graft.    There is a patent in situ, right common femoral to proximal posterior   tibial, vein graft. The graft has become arterialized with atheromatous   changes.    The peak systolic velocities through the vein graft are; 57 cm/s at the   proximal anastomosis, 107 cm/s at the proximal thigh, 138 cm/s mid thigh,   63 cm/s distal graft and at the distal anastomosis to the proximal tibial   artery 45 cm/s.    There appears to be a patent short stent in the native runoff posterior   tibial artery at the surgical anastomosis with velocities measuring 65/15   cm/s    The peak systolic velocities of the native lower extremity are as   follows:     Distal external iliac artery: 135 cm/s   Common femoral artery: 114 cm/s   Deep femoral artery: 62 cm/s  Superficial femoral artery: OCCLUDED  Popliteal artery: OCCLUDED  Posterior tibial artery: OCCLUDED mid and distal  Peroneal artery: OCCLUDED  Anterior tibial artery: 63 cm/s proximal,  43 cm/s mid, 92 cm/s distal  Dorsalis pedis artery: 97 cm/s     Impression:    There is a patent right common femoral to posterior tibial vein graft,   however the runoff posterior tibial artery is occluded.  There is an intact right anterior tibial artery running through the calf.      ANNEMARIE MADERA M.D., ATTENDING RADIOLOGIST  This document has been electronically signed. Dec  3 2019  1:35PM          < end of copied text >

## 2019-12-04 NOTE — CONSULT NOTE ADULT - ASSESSMENT
63 yo woman with CAD (Cath Feb '19 showing 99% RCA, 100% RPLS, 100% Cx) s/p multiple stents/brachytherapy, ESRD (on HD M/T/T/Sa), brittle DM1 c/b neuropathy and retinopathy, with hx of DKA, known to the endocrine service, CHF (EF 30% per last [facility]), mod MR, severe AS, RHF, TIA, severe PVD s/p b/l fempop bypass c/b left thrombosed graft, left subclavian vein stenosis s/p stent, COPD not on O2, gout, hilar lymphadenopathy of unknown etiology, frequent hospitalizations (usually 1-3 times a month) presenting with  SOB of 1 day.     endocrine called for DM assistance today after marked hyperglycemia inpt   pt with glucose to the 500s this am with concern for DKA (bicarb 22, PH 7.32, AG 19, BHOB 2)  less likely with overt DKA this am, especially with Lantus on board.   Hyperglycemia likely due to non compliance with diet and snacking.    pt received 13 units of regular insulin today with a hypoglycemia event post HD. pt remains in the 90s despite no premeal coverage and eating BF and lunch.  Please avoid insulin stacking and high doses of insulin as the pt is a brittle type 1DM and very insulin sensitive. Please monitor glucose closely as pt may be at risk for hypoglycemia. If hypoglycemia ensues inform endocrine.   May need D10 drip until glucose stabilizes in 140-180X2, please inform renal if D10 is started due to fluid volume.     #Diabetes:   check FSBG TID qac and hs and 3am  carb consistent diet   home dose is Lantus 12, has been receiving Lantus 8 inpt.   - Lantus  10u qhs (may need to increase to 12 tomorrow based on am glucose)  -Humalog 4-4-4 with meals   -low dose SS TIDAC/qhs  -low dose bedtime scale  -custom low dose 3am scale for hyperglycemia       inform endocrine of hypoglycemia or persistent hyperglycemia episodes as changes in pts insulin regimen will need to be made.   notify endocrine if any plans to be NPO/diet changes as this will also affect insulin regimen.    DC: basl bolus. final doses TBD  -Upon discharge pt likely to continue preadmission insulin doses of L12/H4. Family adjust insulin accordingly at home   -Pt to f/u with Dr Ley,  pvt endo as out pt.      #HLD  on lipitor 20mg

## 2019-12-04 NOTE — CONSULT NOTE ADULT - ATTENDING COMMENTS
Full consult note as above; discussed with surgery resident and vascular fellow.  She is well known to me. She has pain mostly due to neuropathy. She has patent bypasses in both legs. There is no clear evidence of ischemia.
Rasheeda Christian MD  Pager [phone] ([facility])/ [phone] ([facility]) [please provide 10 digit call back number]  Nights and weekends: [phone]  Please note that this patient may be followed by a different provider tomorrow. If no answer or after hours, please contact [phone].  For final dc reccomendations, please call [phone] or page the endocrine fellow on call.
I have examined pt and agree with above exam and plan. 65 y/o female with brittle DM  again in DKA. Pt with serum Bicarb of 22.  Pt receiving dialysis for ESRD. Pt is hemodynamically stable. Pt does not need ICU far at this time. Please follow endocrine recommendations.  Pt is not a MICU  candidate at this time.

## 2019-12-04 NOTE — CONSULT NOTE ADULT - SUBJECTIVE AND OBJECTIVE BOX
CHIEF COMPLAINT:    HPI:    PAST MEDICAL & SURGICAL HISTORY:  COPD (chronic obstructive pulmonary disease)  Localized enlarged lymph nodes  CHF (congestive heart failure): EF 40-45%  Subclavian vein stenosis, left: s/p stent  DKA, type 1: 1/2015  ACS (acute coronary syndrome): 1/2015 - cath revealed 100% ostial stenosis not amenable to PCI - medical management  TIA (transient ischemic attack): x 2 - 8-9 years ago prior to ASD/VSD repair  CAD (coronary artery disease): s/p stents  Gout: past  CVA (cerebral infarction): with no residual, 8 yrs ago, prior to heart surgery - ST memory loss  Peripheral vascular disease: occluded left fem-pop bypass 5/2015  Diabetes mellitus type 1: Insulin Dependent -  ESRD (end stage renal disease): dialysis  M, tue, thursday, saturday  Hyperlipidemia  Status post device closure of ASD: &quot;clamshell&quot;  History of cardiac catheterization: 1/2015 - no intervention  S/P femoral-popliteal bypass surgery: L and R in 2013 with graft; 5/2015 CFA angioplasty left and ileofemoral endarterectomywith vein patch angioplasty of left fem-pop bypass graft  Multiple vascular surgery both leg, left fempop bypass revision 11/2015  AV (arteriovenous fistula): Left AV graft; revision with stent placement 2-3 years ago  S/P cholecystectomy      FAMILY HISTORY:  Family history of smoking  Family history of hypertension  Family history of cancer (Sibling)      SOCIAL HISTORY:  Smoking: __ packs x ___ years  EtOH Use:  Marital Status:  Occupation:  Recent Travel:  Country of Birth:  Advance Directives:    Allergies    No Known Allergies    Intolerances        HOME MEDICATIONS:    REVIEW OF SYSTEMS:  Constitutional:   Eyes:  ENT:  CV:  Resp:  GI:  :  MSK:  Integumentary:  Neurological:  Psychiatric:  Endocrine:  Hematologic/Lymphatic:  Allergic/Immunologic:  [ ] All other systems negative  [ ] Unable to assess ROS because ________    OBJECTIVE:  ICU Vital Signs Last 24 Hrs  T(C): 36.8 (04 Dec 2019 08:17), Max: 36.8 (04 Dec 2019 07:00)  T(F): 98.3 (04 Dec 2019 08:17), Max: 98.3 (04 Dec 2019 08:17)  HR: 71 (04 Dec 2019 08:17) (69 - 78)  BP: 133/72 (04 Dec 2019 08:17) (113/50 - 133/72)  BP(mean): --  ABP: --  ABP(mean): --  RR: 18 (04 Dec 2019 08:17) (18 - 19)  SpO2: 95% (04 Dec 2019 08:17) (93% - 100%)        12-03 @ 07:01  -  12-04 @ 07:00  --------------------------------------------------------  IN: 1100 mL / OUT: 0 mL / NET: 1100 mL      CAPILLARY BLOOD GLUCOSE      POCT Blood Glucose.: 571 mg/dL (04 Dec 2019 08:25)      PHYSICAL EXAM:  General:   HEENT:   Lymph Nodes:  Neck:   Respiratory:   Cardiovascular:   Abdomen:   Extremities:   Skin:   Neurological:  Psychiatry:    HOSPITAL MEDICATIONS:  MEDICATIONS  (STANDING):  aspirin enteric coated 81 milliGRAM(s) Oral daily  atorvastatin 20 milliGRAM(s) Oral at bedtime  buDESOnide    Inhalation Suspension 0.5 milliGRAM(s) Inhalation every 12 hours  clopidogrel Tablet 75 milliGRAM(s) Oral daily  dextrose 5%. 1000 milliLiter(s) (50 mL/Hr) IV Continuous <Continuous>  dextrose 50% Injectable 12.5 Gram(s) IV Push once  dextrose 50% Injectable 25 Gram(s) IV Push once  dextrose 50% Injectable 25 Gram(s) IV Push once  epoetin azalea Injectable 13301 Unit(s) IV Push once  heparin  Injectable 5000 Unit(s) SubCutaneous every 12 hours  hydrALAZINE 50 milliGRAM(s) Oral daily  hydrALAZINE 100 milliGRAM(s) Oral <User Schedule>  insulin glargine Injectable (LANTUS) 8 Unit(s) SubCutaneous at bedtime  insulin lispro (HumaLOG) corrective regimen sliding scale   SubCutaneous three times a day before meals  insulin lispro (HumaLOG) corrective regimen sliding scale   SubCutaneous at bedtime  insulin lispro Injectable (HumaLOG) 3 Unit(s) SubCutaneous three times a day before meals  lisinopril 40 milliGRAM(s) Oral daily  metoprolol succinate ER 50 milliGRAM(s) Oral daily  pantoprazole    Tablet 40 milliGRAM(s) Oral before breakfast  piperacillin/tazobactam IVPB.. 3.375 Gram(s) IV Intermittent every 12 hours  sevelamer carbonate 800 milliGRAM(s) Oral three times a day with meals    MEDICATIONS  (PRN):  acetaminophen   Tablet .. 650 milliGRAM(s) Oral every 6 hours PRN Mild Pain (1 - 3), Moderate Pain (4 - 6)  dextrose 40% Gel 15 Gram(s) Oral once PRN Blood Glucose LESS THAN 70 milliGRAM(s)/deciliter  glucagon  Injectable 1 milliGRAM(s) IntraMuscular once PRN Glucose LESS THAN 70 milligrams/deciliter  oxycodone    5 mG/acetaminophen 325 mG 1 Tablet(s) Oral every 12 hours PRN Severe Pain (7 - 10)      LABS:                        8.5    5.24  )-----------( 182      ( 04 Dec 2019 05:56 )             27.2     12-04    127<L>  |  86<L>  |  34<H>  ----------------------------<  598<HH>  6.4<HH>   |  22  |  5.56<H>    Ca    8.8      04 Dec 2019 07:27  Phos  7.0     12-04  Mg     2.3     12-04            Venous Blood Gas:  12-04 @ 07:27  7.32/48/30/24/44  VBG Lactate: 1.8      MICROBIOLOGY:     RADIOLOGY:  [ ] Reviewed and interpreted by me    EKG: CHIEF COMPLAINT:    HPI:  Patient is a 63y/o female with PMHx of HLD, CAD, ESRD on HD M/W/F, HFrEF, severe AS, COPD, CVA, PVD s/p b/l fem pop bypass, hx hilar adenopathy, and T1DM (a1c 8.7) w/ multiple prior hospitalizations for DKA, who presented with SOB and was admitted with CAP.     Presented to the ER with c/o worsening SOB x 1 day. Reports that she could not catch her breath with simple walking in her home. Reports that she always has a productive cough from her COPD and may be it was worse x 2 wks but she describes herself a poor historian because of prior CVAs. Reports that yesterday her left leg which is chronically swollen post bypass surgery also appeared very very red & painful       PAST MEDICAL & SURGICAL HISTORY:  COPD (chronic obstructive pulmonary disease)  Localized enlarged lymph nodes  CHF (congestive heart failure): EF 40-45%  Subclavian vein stenosis, left: s/p stent  DKA, type 1: 1/2015  ACS (acute coronary syndrome): 1/2015 - cath revealed 100% ostial stenosis not amenable to PCI - medical management  TIA (transient ischemic attack): x 2 - 8-9 years ago prior to ASD/VSD repair  CAD (coronary artery disease): s/p stents  Gout: past  CVA (cerebral infarction): with no residual, 8 yrs ago, prior to heart surgery - ST memory loss  Peripheral vascular disease: occluded left fem-pop bypass 5/2015  Diabetes mellitus type 1: Insulin Dependent -  ESRD (end stage renal disease): dialysis  M, tue, thursday, saturday  Hyperlipidemia  Status post device closure of ASD: &quot;shantel&quot;  History of cardiac catheterization: 1/2015 - no intervention  S/P femoral-popliteal bypass surgery: L and R in 2013 with graft; 5/2015 CFA angioplasty left and ileofemoral endarterectomywith vein patch angioplasty of left fem-pop bypass graft  Multiple vascular surgery both leg, left fempop bypass revision 11/2015  AV (arteriovenous fistula): Left AV graft; revision with stent placement 2-3 years ago  S/P cholecystectomy      FAMILY HISTORY:  Family history of smoking  Family history of hypertension  Family history of cancer (Sibling)      SOCIAL HISTORY:  Smoking: __ packs x ___ years  EtOH Use:  Marital Status:  Occupation:  Recent Travel:  Country of Birth:  Advance Directives:    Allergies    No Known Allergies    Intolerances        HOME MEDICATIONS:    REVIEW OF SYSTEMS:  Constitutional:   Eyes:  ENT:  CV:  Resp:  GI:  :  MSK:  Integumentary:  Neurological:  Psychiatric:  Endocrine:  Hematologic/Lymphatic:  Allergic/Immunologic:  [ ] All other systems negative  [ ] Unable to assess ROS because ________    OBJECTIVE:  ICU Vital Signs Last 24 Hrs  T(C): 36.8 (04 Dec 2019 08:17), Max: 36.8 (04 Dec 2019 07:00)  T(F): 98.3 (04 Dec 2019 08:17), Max: 98.3 (04 Dec 2019 08:17)  HR: 71 (04 Dec 2019 08:17) (69 - 78)  BP: 133/72 (04 Dec 2019 08:17) (113/50 - 133/72)  BP(mean): --  ABP: --  ABP(mean): --  RR: 18 (04 Dec 2019 08:17) (18 - 19)  SpO2: 95% (04 Dec 2019 08:17) (93% - 100%)        12-03 @ 07:01  -  12-04 @ 07:00  --------------------------------------------------------  IN: 1100 mL / OUT: 0 mL / NET: 1100 mL      CAPILLARY BLOOD GLUCOSE      POCT Blood Glucose.: 571 mg/dL (04 Dec 2019 08:25)      PHYSICAL EXAM:  General:   HEENT:   Lymph Nodes:  Neck:   Respiratory:   Cardiovascular:   Abdomen:   Extremities:   Skin:   Neurological:  Psychiatry:    HOSPITAL MEDICATIONS:  MEDICATIONS  (STANDING):  aspirin enteric coated 81 milliGRAM(s) Oral daily  atorvastatin 20 milliGRAM(s) Oral at bedtime  buDESOnide    Inhalation Suspension 0.5 milliGRAM(s) Inhalation every 12 hours  clopidogrel Tablet 75 milliGRAM(s) Oral daily  dextrose 5%. 1000 milliLiter(s) (50 mL/Hr) IV Continuous <Continuous>  dextrose 50% Injectable 12.5 Gram(s) IV Push once  dextrose 50% Injectable 25 Gram(s) IV Push once  dextrose 50% Injectable 25 Gram(s) IV Push once  epoetin azalea Injectable 90397 Unit(s) IV Push once  heparin  Injectable 5000 Unit(s) SubCutaneous every 12 hours  hydrALAZINE 50 milliGRAM(s) Oral daily  hydrALAZINE 100 milliGRAM(s) Oral <User Schedule>  insulin glargine Injectable (LANTUS) 8 Unit(s) SubCutaneous at bedtime  insulin lispro (HumaLOG) corrective regimen sliding scale   SubCutaneous three times a day before meals  insulin lispro (HumaLOG) corrective regimen sliding scale   SubCutaneous at bedtime  insulin lispro Injectable (HumaLOG) 3 Unit(s) SubCutaneous three times a day before meals  lisinopril 40 milliGRAM(s) Oral daily  metoprolol succinate ER 50 milliGRAM(s) Oral daily  pantoprazole    Tablet 40 milliGRAM(s) Oral before breakfast  piperacillin/tazobactam IVPB.. 3.375 Gram(s) IV Intermittent every 12 hours  sevelamer carbonate 800 milliGRAM(s) Oral three times a day with meals    MEDICATIONS  (PRN):  acetaminophen   Tablet .. 650 milliGRAM(s) Oral every 6 hours PRN Mild Pain (1 - 3), Moderate Pain (4 - 6)  dextrose 40% Gel 15 Gram(s) Oral once PRN Blood Glucose LESS THAN 70 milliGRAM(s)/deciliter  glucagon  Injectable 1 milliGRAM(s) IntraMuscular once PRN Glucose LESS THAN 70 milligrams/deciliter  oxycodone    5 mG/acetaminophen 325 mG 1 Tablet(s) Oral every 12 hours PRN Severe Pain (7 - 10)      LABS:                        8.5    5.24  )-----------( 182      ( 04 Dec 2019 05:56 )             27.2     12-04    127<L>  |  86<L>  |  34<H>  ----------------------------<  598<HH>  6.4<HH>   |  22  |  5.56<H>    Ca    8.8      04 Dec 2019 07:27  Phos  7.0     12-04  Mg     2.3     12-04            Venous Blood Gas:  12-04 @ 07:27  7.32/48/30/24/44  VBG Lactate: 1.8      MICROBIOLOGY:     RADIOLOGY:  [ ] Reviewed and interpreted by me    EKG: CHIEF COMPLAINT: SOB    HPI:  Patient is a 63y/o female with PMHx of HLD, CAD, ESRD on HD M/W/F, HFrEF, severe AS, COPD, CVA, PVD s/p b/l fem pop bypass, hx hilar adenopathy, and T1DM (a1c 8.7) w/ multiple prior hospitalizations for DKA, who presented with SOB and was admitted with CAP.     Presented to the ER with c/o worsening SOB x 1 day. Reported that she could not catch her breath with simple walking in her home. Reported that she always has a productive cough from her COPD and may be it was worse x 2 wks but she described herself a poor historian because of prior CVAs. Reported that her left leg which is chronically swollen post bypass surgery also appeared very red & painful.        PAST MEDICAL & SURGICAL HISTORY:  COPD (chronic obstructive pulmonary disease)  Localized enlarged lymph nodes  CHF (congestive heart failure): EF 40-45%  Subclavian vein stenosis, left: s/p stent  DKA, type 1: 1/2015  ACS (acute coronary syndrome): 1/2015 - cath revealed 100% ostial stenosis not amenable to PCI - medical management  TIA (transient ischemic attack): x 2 - 8-9 years ago prior to ASD/VSD repair  CAD (coronary artery disease): s/p stents  Gout: past  CVA (cerebral infarction): with no residual, 8 yrs ago, prior to heart surgery - ST memory loss  Peripheral vascular disease: occluded left fem-pop bypass 5/2015  Diabetes mellitus type 1: Insulin Dependent -  ESRD (end stage renal disease): dialysis  M, tue, thursday, saturday  Hyperlipidemia  Status post device closure of ASD: &quot;brenna&quot;  History of cardiac catheterization: 1/2015 - no intervention  S/P femoral-popliteal bypass surgery: L and R in 2013 with graft; 5/2015 CFA angioplasty left and ileofemoral endarterectomywith vein patch angioplasty of left fem-pop bypass graft  Multiple vascular surgery both leg, left fempop bypass revision 11/2015  AV (arteriovenous fistula): Left AV graft; revision with stent placement 2-3 years ago  S/P cholecystectomy      FAMILY HISTORY:  Family history of smoking  Family history of hypertension  Family history of cancer (Sibling)         Allergies    No Known Allergies    Intolerances        HOME MEDICATIONS:    REVIEW OF SYSTEMS:  Constitutional: No fevers or chills  Eyes: no eye pain  ENT: no rhinorrhea, or throat pain  CV: No CP or palpitations  Resp: No SOB or wheezing  GI: No abd pain, N/V, diarrhea or constipation  : No dysuria  MSK: +Chronic left calf and foot pain  Integumentary: no rashes  Neurological: denies HA  Endocrine: denies blurry vision or polyuria   Hematologic/Lymphatic: no bleeding bruising     OBJECTIVE:  ICU Vital Signs Last 24 Hrs  T(C): 36.8 (04 Dec 2019 08:17), Max: 36.8 (04 Dec 2019 07:00)  T(F): 98.3 (04 Dec 2019 08:17), Max: 98.3 (04 Dec 2019 08:17)  HR: 71 (04 Dec 2019 08:17) (69 - 78)  BP: 133/72 (04 Dec 2019 08:17) (113/50 - 133/72)  BP(mean): --  ABP: --  ABP(mean): --  RR: 18 (04 Dec 2019 08:17) (18 - 19)  SpO2: 95% (04 Dec 2019 08:17) (93% - 100%)        12-03 @ 07:01  -  12-04 @ 07:00  --------------------------------------------------------  IN: 1100 mL / OUT: 0 mL / NET: 1100 mL      CAPILLARY BLOOD GLUCOSE      POCT Blood Glucose.: 571 mg/dL (04 Dec 2019 08:25)      PHYSICAL EXAM:  General: NAD, comfortably lying in dialysis stretcher  Eyes: no conjunctival erythema  ENT: MMM  Neck: Neck supple, No JVD  Respiratory: CTA B/L, No wheezing, rales, rhonchi, exam limited as pt unable to sit up  CV: RRR no murmurs  Abdominal: Soft, NT, ND +BS  MSK: no focal weakness, distal LLE TTP  Neurology: A&Ox3, nonfocal, CARR x 4  Skin: No Rashes, Hematoma, Ecchymosis  Psych: Calm and appropriate      HOSPITAL MEDICATIONS:  MEDICATIONS  (STANDING):  aspirin enteric coated 81 milliGRAM(s) Oral daily  atorvastatin 20 milliGRAM(s) Oral at bedtime  buDESOnide    Inhalation Suspension 0.5 milliGRAM(s) Inhalation every 12 hours  clopidogrel Tablet 75 milliGRAM(s) Oral daily  dextrose 5%. 1000 milliLiter(s) (50 mL/Hr) IV Continuous <Continuous>  dextrose 50% Injectable 12.5 Gram(s) IV Push once  dextrose 50% Injectable 25 Gram(s) IV Push once  dextrose 50% Injectable 25 Gram(s) IV Push once  epoetin azalea Injectable 47061 Unit(s) IV Push once  heparin  Injectable 5000 Unit(s) SubCutaneous every 12 hours  hydrALAZINE 50 milliGRAM(s) Oral daily  hydrALAZINE 100 milliGRAM(s) Oral <User Schedule>  insulin glargine Injectable (LANTUS) 8 Unit(s) SubCutaneous at bedtime  insulin lispro (HumaLOG) corrective regimen sliding scale   SubCutaneous three times a day before meals  insulin lispro (HumaLOG) corrective regimen sliding scale   SubCutaneous at bedtime  insulin lispro Injectable (HumaLOG) 3 Unit(s) SubCutaneous three times a day before meals  lisinopril 40 milliGRAM(s) Oral daily  metoprolol succinate ER 50 milliGRAM(s) Oral daily  pantoprazole    Tablet 40 milliGRAM(s) Oral before breakfast  piperacillin/tazobactam IVPB.. 3.375 Gram(s) IV Intermittent every 12 hours  sevelamer carbonate 800 milliGRAM(s) Oral three times a day with meals    MEDICATIONS  (PRN):  acetaminophen   Tablet .. 650 milliGRAM(s) Oral every 6 hours PRN Mild Pain (1 - 3), Moderate Pain (4 - 6)  dextrose 40% Gel 15 Gram(s) Oral once PRN Blood Glucose LESS THAN 70 milliGRAM(s)/deciliter  glucagon  Injectable 1 milliGRAM(s) IntraMuscular once PRN Glucose LESS THAN 70 milligrams/deciliter  oxycodone    5 mG/acetaminophen 325 mG 1 Tablet(s) Oral every 12 hours PRN Severe Pain (7 - 10)      LABS:                        8.5    5.24  )-----------( 182      ( 04 Dec 2019 05:56 )             27.2     12-04    127<L>  |  86<L>  |  34<H>  ----------------------------<  598<HH>  6.4<HH>   |  22  |  5.56<H>    Ca    8.8      04 Dec 2019 07:27  Phos  7.0     12-04  Mg     2.3     12-04            Venous Blood Gas:  12-04 @ 07:27  7.32/48/30/24/44  VBG Lactate: 1.8      MICROBIOLOGY:       RADIOLOGY:  [x] Reviewed and interpreted by me    EKG: CHIEF COMPLAINT: SOB    HPI:  Patient is a 65y/o female with PMHx of HLD, CAD, ESRD on HD M/W/F, HFrEF, severe AS, COPD, CVA, PVD s/p b/l fem pop bypass, hx hilar adenopathy, and T1DM (a1c 8.7) w/ multiple prior hospitalizations for DKA, who presented with SOB and was admitted with CAP.     Presented to the ER with c/o worsening SOB x 1 day. Reported that she could not catch her breath with simple walking in her home. Reported that she always has a productive cough from her COPD and may be it was worse x 2 wks but she described herself a poor historian because of prior CVAs. Reported that her left leg which is chronically swollen post bypass surgery also appeared very red & painful.  Patient denies and changes in her diet. Basal 8u QHS given last night. Pt denies any DKA symptoms.       PAST MEDICAL & SURGICAL HISTORY:  COPD (chronic obstructive pulmonary disease)  Localized enlarged lymph nodes  CHF (congestive heart failure): EF 40-45%  Subclavian vein stenosis, left: s/p stent  DKA, type 1: 1/2015  ACS (acute coronary syndrome): 1/2015 - cath revealed 100% ostial stenosis not amenable to PCI - medical management  TIA (transient ischemic attack): x 2 - 8-9 years ago prior to ASD/VSD repair  CAD (coronary artery disease): s/p stents  Gout: past  CVA (cerebral infarction): with no residual, 8 yrs ago, prior to heart surgery - ST memory loss  Peripheral vascular disease: occluded left fem-pop bypass 5/2015  Diabetes mellitus type 1: Insulin Dependent -  ESRD (end stage renal disease): dialysis  M, tue, thursday, saturday  Hyperlipidemia  Status post device closure of ASD: &quot;clamshell&quot;  History of cardiac catheterization: 1/2015 - no intervention  S/P femoral-popliteal bypass surgery: L and R in 2013 with graft; 5/2015 CFA angioplasty left and ileofemoral endarterectomywith vein patch angioplasty of left fem-pop bypass graft  Multiple vascular surgery both leg, left fempop bypass revision 11/2015  AV (arteriovenous fistula): Left AV graft; revision with stent placement 2-3 years ago  S/P cholecystectomy      FAMILY HISTORY:  Family history of smoking  Family history of hypertension  Family history of cancer (Sibling)         Allergies    No Known Allergies    Intolerances        HOME MEDICATIONS:    REVIEW OF SYSTEMS:  Constitutional: No fevers or chills  Eyes: no eye pain  ENT: no rhinorrhea, or throat pain  CV: No CP or palpitations  Resp: No SOB or wheezing  GI: No abd pain, N/V, diarrhea or constipation  : No dysuria  MSK: +Chronic left calf and foot pain  Integumentary: no rashes  Neurological: denies HA  Endocrine: denies blurry vision or polyuria   Hematologic/Lymphatic: no bleeding bruising     OBJECTIVE:  ICU Vital Signs Last 24 Hrs  T(C): 36.8 (04 Dec 2019 08:17), Max: 36.8 (04 Dec 2019 07:00)  T(F): 98.3 (04 Dec 2019 08:17), Max: 98.3 (04 Dec 2019 08:17)  HR: 71 (04 Dec 2019 08:17) (69 - 78)  BP: 133/72 (04 Dec 2019 08:17) (113/50 - 133/72)  BP(mean): --  ABP: --  ABP(mean): --  RR: 18 (04 Dec 2019 08:17) (18 - 19)  SpO2: 95% (04 Dec 2019 08:17) (93% - 100%)        12-03 @ 07:01  -  12-04 @ 07:00  --------------------------------------------------------  IN: 1100 mL / OUT: 0 mL / NET: 1100 mL      CAPILLARY BLOOD GLUCOSE      POCT Blood Glucose.: 571 mg/dL (04 Dec 2019 08:25)      PHYSICAL EXAM:  General: NAD, comfortably lying in dialysis stretcher  Eyes: no conjunctival erythema  ENT: MMM  Neck: Neck supple, No JVD  Respiratory: CTA B/L, No wheezing, rales, rhonchi, exam limited as pt unable to sit up  CV: RRR no murmurs  Abdominal: Soft, NT, ND +BS  MSK: no focal weakness, distal LLE TTP  Neurology: A&Ox3, nonfocal, CARR x 4  Skin: No Rashes, Hematoma, Ecchymosis  Psych: Calm and appropriate      HOSPITAL MEDICATIONS:  MEDICATIONS  (STANDING):  aspirin enteric coated 81 milliGRAM(s) Oral daily  atorvastatin 20 milliGRAM(s) Oral at bedtime  buDESOnide    Inhalation Suspension 0.5 milliGRAM(s) Inhalation every 12 hours  clopidogrel Tablet 75 milliGRAM(s) Oral daily  dextrose 5%. 1000 milliLiter(s) (50 mL/Hr) IV Continuous <Continuous>  dextrose 50% Injectable 12.5 Gram(s) IV Push once  dextrose 50% Injectable 25 Gram(s) IV Push once  dextrose 50% Injectable 25 Gram(s) IV Push once  epoetin azalea Injectable 64729 Unit(s) IV Push once  heparin  Injectable 5000 Unit(s) SubCutaneous every 12 hours  hydrALAZINE 50 milliGRAM(s) Oral daily  hydrALAZINE 100 milliGRAM(s) Oral <User Schedule>  insulin glargine Injectable (LANTUS) 8 Unit(s) SubCutaneous at bedtime  insulin lispro (HumaLOG) corrective regimen sliding scale   SubCutaneous three times a day before meals  insulin lispro (HumaLOG) corrective regimen sliding scale   SubCutaneous at bedtime  insulin lispro Injectable (HumaLOG) 3 Unit(s) SubCutaneous three times a day before meals  lisinopril 40 milliGRAM(s) Oral daily  metoprolol succinate ER 50 milliGRAM(s) Oral daily  pantoprazole    Tablet 40 milliGRAM(s) Oral before breakfast  piperacillin/tazobactam IVPB.. 3.375 Gram(s) IV Intermittent every 12 hours  sevelamer carbonate 800 milliGRAM(s) Oral three times a day with meals    MEDICATIONS  (PRN):  acetaminophen   Tablet .. 650 milliGRAM(s) Oral every 6 hours PRN Mild Pain (1 - 3), Moderate Pain (4 - 6)  dextrose 40% Gel 15 Gram(s) Oral once PRN Blood Glucose LESS THAN 70 milliGRAM(s)/deciliter  glucagon  Injectable 1 milliGRAM(s) IntraMuscular once PRN Glucose LESS THAN 70 milligrams/deciliter  oxycodone    5 mG/acetaminophen 325 mG 1 Tablet(s) Oral every 12 hours PRN Severe Pain (7 - 10)      LABS:                        8.5    5.24  )-----------( 182      ( 04 Dec 2019 05:56 )             27.2     12-04    127<L>  |  86<L>  |  34<H>  ----------------------------<  598<HH>  6.4<HH>   |  22  |  5.56<H>    Ca    8.8      04 Dec 2019 07:27  Phos  7.0     12-04  Mg     2.3     12-04            Venous Blood Gas:  12-04 @ 07:27  7.32/48/30/24/44  VBG Lactate: 1.8      MICROBIOLOGY:       RADIOLOGY:  [x] Reviewed and interpreted by me    EKG:

## 2019-12-04 NOTE — CHART NOTE - NSCHARTNOTEFT_GEN_A_CORE
[facility] 3DSU 351 W1  NATHAN MEEKS, 64y, Female  [id number]    Notified by lab that the above patient had a critical value of: Potassium of 6.2 and glucose of 560.   No dilution or hemolysis reported by lab. Patient ambulatory around unit, complaining of mild chronic foot pain but denying other complaints. RN reports that there were no infusions running that could have contaminated draw.      Interventions Taken: Hyperkalemia, Hyperglycemia  Patient with ESRD-HD. Last HD session yesterday (Tuesday 12/3). Additionally with a history of diabetes on insulin.   1)  2)  3)  4)  5) Will endorse the above diagnostics and findings to the day medicine team for review and follow up.       T(C): 36.6 (12-04-19 @ 04:45), Max: 36.6 (12-03-19 @ 14:28)  HR: 78 (12-04-19 @ 04:45) (69 - 78)  BP: 113/50 (12-04-19 @ 04:45) (113/50 - 128/69)  RR: 18 (12-04-19 @ 04:45) (18 - 19)  SpO2: 93% (12-04-19 @ 04:45) (93% - 100%)    12-04    129<L>  |  86<L>  |  33<H>  ----------------------------<  560<HH>  6.2<HH>   |  23  |  5.48<H>    Ca    8.6      04 Dec 2019 05:55  Phos  7.0     12-04      Guicho Gant NP  Department of Medicine  # [facility] 3DSU 351 [address]  NATHAN MEEKS, 64y, Female  [id number]    Notified by lab that the above patient had a critical value of: Potassium of 6.2 and glucose of 560.   No dilution or hemolysis reported by lab.  RN reports that there were no infusions running that could have contaminated draw. Elevated blood glucose confirmed via fingerstick.  Patient awake, alert, and vitals stable. Patient ambulatory around unit, complaining of mild chronic foot pain but denying other complaints. Patient with ESRD-HD. Last HD session Monday, 12/2. Due for HD today. 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 Patient states that she has "gone really high and then really low," throughout multiple admissions.     Interventions Taken: Hyperkalemia, Hyperglycemia  Additionally with elevated anion gap and hyponatremia, likely secondary to hyperglycemia.   1) BMP, Magnesium, Lactate, VBG, and Beta Hydroxy. Screening EKG.   2) Calcium gluconate, Lokelma, gentle low volume IVF (due to CHF), and regular insulin 5 units IVP x1 to treat hyperkalemia and hyperglycemia.   3) Discussed above with Dr. Muñoz who agreed with initial plan and requested urgent calls be placed to Endocrine and Nephrology.   4) Pages placed with both Endocrinology Fellow on call, as well as following Nephrology attending Dr. SHALONDA Schmidt. Endorsed to day medicine team to follow with consultants recommendations.   5) Will endorse the above diagnostics and findings to the day medicine team for review and follow up. Above occurred during change of shift. Was discussed with oncoming day medicine team who will be following up.     T(C): 36.6 (12-04-19 @ 04:45), Max: 36.6 (12-03-19 @ 14:28)  HR: 78 (12-04-19 @ 04:45) (69 - 78)  BP: 113/50 (12-04-19 @ 04:45) (113/50 - 128/69)  RR: 18 (12-04-19 @ 04:45) (18 - 19)  SpO2: 93% (12-04-19 @ 04:45) (93% - 100%)    12-04    129<L>  |  86<L>  |  33<H>  ----------------------------<  560<HH>  6.2<HH>   |  23  |  5.48<H>    Ca    8.6      04 Dec 2019 05:55  Phos  7.0     12-04      Guicho Gant NP  Department of Medicine  #[id number] [facility] 3DSU [address]  NATHAN MEEKS, 64y, Female  [id number]    Notified by lab that the above patient had a critical value of: Potassium of 6.2 and glucose of 560.   No dilution or hemolysis reported by lab.  RN reports that there were no infusions running that could have contaminated draw. Elevated blood glucose confirmed via fingerstick.  Patient awake, alert, and vitals stable. Patient ambulatory around unit, complaining of mild chronic foot pain but denying other complaints. Patient with ESRD-HD. Last HD session Monday, 12/2. Due for HD today. 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 Patient states that she has "gone really high and then really low," throughout multiple admissions.     Interventions Taken: Hyperkalemia, Hyperglycemia  Additionally with elevated anion gap and hyponatremia, likely secondary to hyperglycemia.   1) BMP, Magnesium, Lactate, VBG, and Beta Hydroxy. Screening EKG.   2) Calcium gluconate, Lokelma, and regular insulin 5 units IVP x1 to treat hyperkalemia and hyperglycemia.   3) Discussed above with Dr. Muñoz who agreed with initial plan and requested urgent calls be placed to Endocrine and Nephrology.   4) Pages placed with both Endocrinology Fellow on call, as well as following Nephrology attending Dr. SHALONDA Schmidt. Endorsed to day medicine team to follow with consultants recommendations.   5) Will endorse the above diagnostics and findings to the day medicine team for review and follow up. Above occurred during change of shift. Was discussed with oncoming day medicine team who will be following up.     T(C): 36.6 (12-04-19 @ 04:45), Max: 36.6 (12-03-19 @ 14:28)  HR: 78 (12-04-19 @ 04:45) (69 - 78)  BP: 113/50 (12-04-19 @ 04:45) (113/50 - 128/69)  RR: 18 (12-04-19 @ 04:45) (18 - 19)  SpO2: 93% (12-04-19 @ 04:45) (93% - 100%)    12-04    129<L>  |  86<L>  |  33<H>  ----------------------------<  560<HH>  6.2<HH>   |  23  |  5.48<H>    Ca    8.6      04 Dec 2019 05:55  Phos  7.0     12-04      Guicho Gant NP  Department of Medicine  #[id number]

## 2019-12-05 LAB
ALBUMIN SERPL ELPH-MCNC: 3.9 G/DL — SIGNIFICANT CHANGE UP (ref 3.3–5)
ALBUMIN SERPL ELPH-MCNC: 4 G/DL — SIGNIFICANT CHANGE UP (ref 3.3–5)
ALP SERPL-CCNC: 113 U/L — SIGNIFICANT CHANGE UP (ref 40–120)
ALP SERPL-CCNC: 118 U/L — SIGNIFICANT CHANGE UP (ref 40–120)
ALT FLD-CCNC: 19 U/L — SIGNIFICANT CHANGE UP (ref 10–45)
ALT FLD-CCNC: 20 U/L — SIGNIFICANT CHANGE UP (ref 10–45)
ANION GAP SERPL CALC-SCNC: 17 MMOL/L — SIGNIFICANT CHANGE UP (ref 5–17)
ANION GAP SERPL CALC-SCNC: 20 MMOL/L — HIGH (ref 5–17)
AST SERPL-CCNC: 15 U/L — SIGNIFICANT CHANGE UP (ref 10–40)
AST SERPL-CCNC: 16 U/L — SIGNIFICANT CHANGE UP (ref 10–40)
B-OH-BUTYR SERPL-SCNC: 0.1 MMOL/L — SIGNIFICANT CHANGE UP
B-OH-BUTYR SERPL-SCNC: 0.1 MMOL/L — SIGNIFICANT CHANGE UP
B-OH-BUTYR SERPL-SCNC: 2 MMOL/L — HIGH
BASE EXCESS BLDV CALC-SCNC: -1.7 MMOL/L — SIGNIFICANT CHANGE UP (ref -2–2)
BASE EXCESS BLDV CALC-SCNC: 1.9 MMOL/L — SIGNIFICANT CHANGE UP (ref -2–2)
BILIRUB SERPL-MCNC: 0.2 MG/DL — SIGNIFICANT CHANGE UP (ref 0.2–1.2)
BILIRUB SERPL-MCNC: 0.3 MG/DL — SIGNIFICANT CHANGE UP (ref 0.2–1.2)
BUN SERPL-MCNC: 26 MG/DL — HIGH (ref 7–23)
BUN SERPL-MCNC: 28 MG/DL — HIGH (ref 7–23)
CA-I SERPL-SCNC: 1.08 MMOL/L — LOW (ref 1.12–1.3)
CA-I SERPL-SCNC: 1.19 MMOL/L — SIGNIFICANT CHANGE UP (ref 1.12–1.3)
CALCIUM SERPL-MCNC: 9 MG/DL — SIGNIFICANT CHANGE UP (ref 8.4–10.5)
CALCIUM SERPL-MCNC: 9.2 MG/DL — SIGNIFICANT CHANGE UP (ref 8.4–10.5)
CHLORIDE BLDV-SCNC: 87 MMOL/L — LOW (ref 96–108)
CHLORIDE BLDV-SCNC: 93 MMOL/L — LOW (ref 96–108)
CHLORIDE SERPL-SCNC: 86 MMOL/L — LOW (ref 96–108)
CHLORIDE SERPL-SCNC: 90 MMOL/L — LOW (ref 96–108)
CO2 BLDV-SCNC: 25 MMOL/L — SIGNIFICANT CHANGE UP (ref 22–30)
CO2 BLDV-SCNC: 28 MMOL/L — SIGNIFICANT CHANGE UP (ref 22–30)
CO2 SERPL-SCNC: 23 MMOL/L — SIGNIFICANT CHANGE UP (ref 22–31)
CO2 SERPL-SCNC: 23 MMOL/L — SIGNIFICANT CHANGE UP (ref 22–31)
CREAT SERPL-MCNC: 4.07 MG/DL — HIGH (ref 0.5–1.3)
CREAT SERPL-MCNC: 4.35 MG/DL — HIGH (ref 0.5–1.3)
GAS PNL BLDV: 127 MMOL/L — LOW (ref 135–145)
GAS PNL BLDV: 134 MMOL/L — LOW (ref 135–145)
GAS PNL BLDV: SIGNIFICANT CHANGE UP
GLUCOSE BLDC GLUCOMTR-MCNC: 131 MG/DL — HIGH (ref 70–99)
GLUCOSE BLDC GLUCOMTR-MCNC: 182 MG/DL — HIGH (ref 70–99)
GLUCOSE BLDC GLUCOMTR-MCNC: 262 MG/DL — HIGH (ref 70–99)
GLUCOSE BLDC GLUCOMTR-MCNC: 393 MG/DL — HIGH (ref 70–99)
GLUCOSE BLDC GLUCOMTR-MCNC: 405 MG/DL — HIGH (ref 70–99)
GLUCOSE BLDC GLUCOMTR-MCNC: 492 MG/DL — CRITICAL HIGH (ref 70–99)
GLUCOSE BLDC GLUCOMTR-MCNC: 493 MG/DL — CRITICAL HIGH (ref 70–99)
GLUCOSE BLDC GLUCOMTR-MCNC: 511 MG/DL — CRITICAL HIGH (ref 70–99)
GLUCOSE BLDC GLUCOMTR-MCNC: 522 MG/DL — CRITICAL HIGH (ref 70–99)
GLUCOSE BLDC GLUCOMTR-MCNC: 539 MG/DL — CRITICAL HIGH (ref 70–99)
GLUCOSE BLDC GLUCOMTR-MCNC: 594 MG/DL — CRITICAL HIGH (ref 70–99)
GLUCOSE BLDC GLUCOMTR-MCNC: 87 MG/DL — SIGNIFICANT CHANGE UP (ref 70–99)
GLUCOSE BLDC GLUCOMTR-MCNC: >600 MG/DL — CRITICAL HIGH (ref 70–99)
GLUCOSE BLDV-MCNC: 547 MG/DL — CRITICAL HIGH (ref 70–99)
GLUCOSE BLDV-MCNC: 703 MG/DL — CRITICAL HIGH (ref 70–99)
GLUCOSE SERPL-MCNC: 547 MG/DL — CRITICAL HIGH (ref 70–99)
GLUCOSE SERPL-MCNC: 596 MG/DL — CRITICAL HIGH (ref 70–99)
HCO3 BLDV-SCNC: 23 MMOL/L — SIGNIFICANT CHANGE UP (ref 21–29)
HCO3 BLDV-SCNC: 27 MMOL/L — SIGNIFICANT CHANGE UP (ref 21–29)
HCT VFR BLD CALC: 26.1 % — LOW (ref 34.5–45)
HCT VFR BLDA CALC: 27 % — LOW (ref 39–50)
HCT VFR BLDA CALC: 28 % — LOW (ref 39–50)
HGB BLD CALC-MCNC: 8.7 G/DL — LOW (ref 11.5–15.5)
HGB BLD CALC-MCNC: 8.9 G/DL — LOW (ref 11.5–15.5)
HGB BLD-MCNC: 8.3 G/DL — LOW (ref 11.5–15.5)
HOROWITZ INDEX BLDV+IHG-RTO: SIGNIFICANT CHANGE UP
LACTATE BLDV-MCNC: 1.9 MMOL/L — SIGNIFICANT CHANGE UP (ref 0.7–2)
LACTATE BLDV-MCNC: 2.6 MMOL/L — HIGH (ref 0.7–2)
MAGNESIUM SERPL-MCNC: 2.1 MG/DL — SIGNIFICANT CHANGE UP (ref 1.6–2.6)
MCHC RBC-ENTMCNC: 31.8 GM/DL — LOW (ref 32–36)
MCHC RBC-ENTMCNC: 32.9 PG — SIGNIFICANT CHANGE UP (ref 27–34)
MCV RBC AUTO: 103.6 FL — HIGH (ref 80–100)
NRBC # BLD: 0 /100 WBCS — SIGNIFICANT CHANGE UP (ref 0–0)
OTHER CELLS CSF MANUAL: 10 ML/DL — LOW (ref 18–22)
OTHER CELLS CSF MANUAL: 11 ML/DL — LOW (ref 18–22)
PCO2 BLDV: 44 MMHG — SIGNIFICANT CHANGE UP (ref 35–50)
PCO2 BLDV: 47 MMHG — SIGNIFICANT CHANGE UP (ref 35–50)
PH BLDV: 7.35 — SIGNIFICANT CHANGE UP (ref 7.35–7.45)
PH BLDV: 7.37 — SIGNIFICANT CHANGE UP (ref 7.35–7.45)
PLATELET # BLD AUTO: 181 K/UL — SIGNIFICANT CHANGE UP (ref 150–400)
PO2 BLDV: 56 MMHG — HIGH (ref 25–45)
PO2 BLDV: 62 MMHG — HIGH (ref 25–45)
POTASSIUM BLDV-SCNC: 5.1 MMOL/L — SIGNIFICANT CHANGE UP (ref 3.5–5.3)
POTASSIUM BLDV-SCNC: 5.7 MMOL/L — HIGH (ref 3.5–5.3)
POTASSIUM SERPL-MCNC: 5.3 MMOL/L — SIGNIFICANT CHANGE UP (ref 3.5–5.3)
POTASSIUM SERPL-MCNC: 5.4 MMOL/L — HIGH (ref 3.5–5.3)
POTASSIUM SERPL-SCNC: 5.3 MMOL/L — SIGNIFICANT CHANGE UP (ref 3.5–5.3)
POTASSIUM SERPL-SCNC: 5.4 MMOL/L — HIGH (ref 3.5–5.3)
PROT SERPL-MCNC: 6.9 G/DL — SIGNIFICANT CHANGE UP (ref 6–8.3)
PROT SERPL-MCNC: 7 G/DL — SIGNIFICANT CHANGE UP (ref 6–8.3)
RBC # BLD: 2.52 M/UL — LOW (ref 3.8–5.2)
RBC # FLD: 13.6 % — SIGNIFICANT CHANGE UP (ref 10.3–14.5)
SAO2 % BLDV: 86 % — SIGNIFICANT CHANGE UP (ref 67–88)
SAO2 % BLDV: 90 % — HIGH (ref 67–88)
SODIUM SERPL-SCNC: 129 MMOL/L — LOW (ref 135–145)
SODIUM SERPL-SCNC: 130 MMOL/L — LOW (ref 135–145)
WBC # BLD: 5.02 K/UL — SIGNIFICANT CHANGE UP (ref 3.8–10.5)
WBC # FLD AUTO: 5.02 K/UL — SIGNIFICANT CHANGE UP (ref 3.8–10.5)

## 2019-12-05 PROCEDURE — 99232 SBSQ HOSP IP/OBS MODERATE 35: CPT

## 2019-12-05 RX ORDER — HUMAN INSULIN 100 [IU]/ML
3 INJECTION, SUSPENSION SUBCUTANEOUS ONCE
Refills: 0 | Status: COMPLETED | OUTPATIENT
Start: 2019-12-05 | End: 2019-12-05

## 2019-12-05 RX ORDER — OXYCODONE AND ACETAMINOPHEN 5; 325 MG/1; MG/1
1 TABLET ORAL EVERY 8 HOURS
Refills: 0 | Status: DISCONTINUED | OUTPATIENT
Start: 2019-12-05 | End: 2019-12-09

## 2019-12-05 RX ORDER — INSULIN GLARGINE 100 [IU]/ML
11 INJECTION, SOLUTION SUBCUTANEOUS AT BEDTIME
Refills: 0 | Status: DISCONTINUED | OUTPATIENT
Start: 2019-12-05 | End: 2019-12-08

## 2019-12-05 RX ORDER — INSULIN LISPRO 100/ML
3 VIAL (ML) SUBCUTANEOUS AT BEDTIME
Refills: 0 | Status: DISCONTINUED | OUTPATIENT
Start: 2019-12-05 | End: 2019-12-06

## 2019-12-05 RX ORDER — INSULIN LISPRO 100/ML
3 VIAL (ML) SUBCUTANEOUS ONCE
Refills: 0 | Status: DISCONTINUED | OUTPATIENT
Start: 2019-12-05 | End: 2019-12-05

## 2019-12-05 RX ORDER — INSULIN LISPRO 100/ML
3 VIAL (ML) SUBCUTANEOUS ONCE
Refills: 0 | Status: COMPLETED | OUTPATIENT
Start: 2019-12-05 | End: 2019-12-05

## 2019-12-05 RX ORDER — CEFEPIME 1 G/1
1000 INJECTION, POWDER, FOR SOLUTION INTRAMUSCULAR; INTRAVENOUS DAILY
Refills: 0 | Status: COMPLETED | OUTPATIENT
Start: 2019-12-05 | End: 2019-12-07

## 2019-12-05 RX ORDER — PIPERACILLIN AND TAZOBACTAM 4; .5 G/20ML; G/20ML
3.38 INJECTION, POWDER, LYOPHILIZED, FOR SOLUTION INTRAVENOUS EVERY 12 HOURS
Refills: 0 | Status: DISCONTINUED | OUTPATIENT
Start: 2019-12-05 | End: 2019-12-05

## 2019-12-05 RX ADMIN — OXYCODONE AND ACETAMINOPHEN 1 TABLET(S): 5; 325 TABLET ORAL at 20:16

## 2019-12-05 RX ADMIN — CEFEPIME 100 MILLIGRAM(S): 1 INJECTION, POWDER, FOR SOLUTION INTRAMUSCULAR; INTRAVENOUS at 12:09

## 2019-12-05 RX ADMIN — OXYCODONE AND ACETAMINOPHEN 1 TABLET(S): 5; 325 TABLET ORAL at 10:37

## 2019-12-05 RX ADMIN — SEVELAMER CARBONATE 800 MILLIGRAM(S): 2400 POWDER, FOR SUSPENSION ORAL at 17:32

## 2019-12-05 RX ADMIN — HUMAN INSULIN 3 UNIT(S): 100 INJECTION, SUSPENSION SUBCUTANEOUS at 08:33

## 2019-12-05 RX ADMIN — Medication 3: at 02:26

## 2019-12-05 RX ADMIN — CLOPIDOGREL BISULFATE 75 MILLIGRAM(S): 75 TABLET, FILM COATED ORAL at 10:40

## 2019-12-05 RX ADMIN — Medication 0.5 MILLIGRAM(S): at 05:07

## 2019-12-05 RX ADMIN — Medication 100 MILLIGRAM(S): at 17:32

## 2019-12-05 RX ADMIN — ATORVASTATIN CALCIUM 20 MILLIGRAM(S): 80 TABLET, FILM COATED ORAL at 22:11

## 2019-12-05 RX ADMIN — Medication 3 UNIT(S): at 14:51

## 2019-12-05 RX ADMIN — Medication 3 MILLILITER(S): at 12:08

## 2019-12-05 RX ADMIN — INSULIN GLARGINE 11 UNIT(S): 100 INJECTION, SOLUTION SUBCUTANEOUS at 22:16

## 2019-12-05 RX ADMIN — OXYCODONE AND ACETAMINOPHEN 1 TABLET(S): 5; 325 TABLET ORAL at 11:30

## 2019-12-05 RX ADMIN — Medication 3: at 12:07

## 2019-12-05 RX ADMIN — Medication 3 MILLILITER(S): at 23:12

## 2019-12-05 RX ADMIN — Medication 81 MILLIGRAM(S): at 10:40

## 2019-12-05 RX ADMIN — SEVELAMER CARBONATE 800 MILLIGRAM(S): 2400 POWDER, FOR SUSPENSION ORAL at 12:08

## 2019-12-05 RX ADMIN — HEPARIN SODIUM 5000 UNIT(S): 5000 INJECTION INTRAVENOUS; SUBCUTANEOUS at 10:32

## 2019-12-05 RX ADMIN — Medication 3 MILLILITER(S): at 17:32

## 2019-12-05 RX ADMIN — Medication 4 UNIT(S): at 12:08

## 2019-12-05 RX ADMIN — Medication 650 MILLIGRAM(S): at 18:30

## 2019-12-05 RX ADMIN — Medication 3 UNIT(S): at 04:21

## 2019-12-05 RX ADMIN — Medication 650 MILLIGRAM(S): at 17:33

## 2019-12-05 RX ADMIN — Medication 50 MILLIGRAM(S): at 05:07

## 2019-12-05 RX ADMIN — Medication 6: at 07:55

## 2019-12-05 RX ADMIN — SEVELAMER CARBONATE 800 MILLIGRAM(S): 2400 POWDER, FOR SUSPENSION ORAL at 14:51

## 2019-12-05 RX ADMIN — PANTOPRAZOLE SODIUM 40 MILLIGRAM(S): 20 TABLET, DELAYED RELEASE ORAL at 05:07

## 2019-12-05 RX ADMIN — Medication 4 UNIT(S): at 17:35

## 2019-12-05 RX ADMIN — OXYCODONE AND ACETAMINOPHEN 1 TABLET(S): 5; 325 TABLET ORAL at 19:46

## 2019-12-05 RX ADMIN — LISINOPRIL 40 MILLIGRAM(S): 2.5 TABLET ORAL at 05:07

## 2019-12-05 RX ADMIN — Medication 3 MILLILITER(S): at 05:09

## 2019-12-05 RX ADMIN — Medication 0.5 MILLIGRAM(S): at 17:32

## 2019-12-05 NOTE — PROVIDER CONTACT NOTE (OTHER) - SITUATION
Patient's Lab Blood Glucose Fingertip Result is 405 and immediate repeat Blood Glucose Fingertip Result is 393.

## 2019-12-05 NOTE — PROVIDER CONTACT NOTE (CRITICAL VALUE NOTIFICATION) - ACTION/TREATMENT ORDERED:
Patient scheduled to go for first shift hemodialysis.   @ 7:49 Calcium gluconate 1g IV solution administered   @ 7:37 HumuLIN R 5 units administered  @ 8:40 HumLIN R  8 units administered.   POCT Blood Glucose monitored, see flow sheet for result. Patient going to dialysis. Report given to RN.
NP aware & assessed patient & reviewed all lab results & orders. Corrective Regimen Insulin Sliding Scale administered. NP ordered NPO, Humulin N insulin 3 units, Labs: CMP & Beta Hydroxy-Butyrate. NP ordered to reschedule pre-meal insulin since patient is NPO. Nephrology & Endocrinology consulted. Providers re-educated patient regarding diabetes.
NP aware & assessed patient & reviewed all lab results, blood glucose results and orders. Endocrinology consulted. NP ordered to re-assess patient's Blood Glucose Fingertip Results post 2 hours and no additional orders at this time.

## 2019-12-05 NOTE — PROVIDER CONTACT NOTE (OTHER) - ASSESSMENT
Patient is asymptomatic. Patient is Alert and Oriented times Four. Patient denies chest pain, discomfort, palpitations, shortness of breath, dizziness and lightheadedness. No signs and symptoms of hyperglycemia noted. Patient denies feeling hot and dry. Patient's Heart Rhythm is Normal Sinus Rhythm on the cardiac monitor. NPO maintained.

## 2019-12-05 NOTE — PROVIDER CONTACT NOTE (OTHER) - ASSESSMENT
Patient is asymptomatic. Patient is Alert and Oriented times Four. Patient denies chest pain, discomfort, palpitations, shortness of breath, dizziness and lightheadedness. No signs and symptoms of hyperglycemia noted. Patient denies feeling hot and dry. Patient's Heart Rhythm is Normal Sinus Rhythm on the cardiac monitor. NPO maintained as ordered. Insulin Humulin N 3 Units Subcutaneous was administered as ordered and as documented.

## 2019-12-05 NOTE — PROGRESS NOTE ADULT - SUBJECTIVE AND OBJECTIVE BOX
Patient is a 64y old  Female who presents with a chief complaint of shortness of breath (05 Dec 2019 17:34)      SUBJECTIVE / OVERNIGHT EVENTS: Comfortable without new complaints. Less pain L leg  Review of Systems  chest pain no  palpitations no  sob no  nausea no  headache no    MEDICATIONS  (STANDING):  albuterol/ipratropium for Nebulization 3 milliLiter(s) Nebulizer every 6 hours  aspirin enteric coated 81 milliGRAM(s) Oral daily  atorvastatin 20 milliGRAM(s) Oral at bedtime  buDESOnide    Inhalation Suspension 0.5 milliGRAM(s) Inhalation every 12 hours  cefepime   IVPB 1000 milliGRAM(s) IV Intermittent daily  clopidogrel Tablet 75 milliGRAM(s) Oral daily  dextrose 5%. 1000 milliLiter(s) (50 mL/Hr) IV Continuous <Continuous>  dextrose 50% Injectable 12.5 Gram(s) IV Push once  dextrose 50% Injectable 25 Gram(s) IV Push once  dextrose 50% Injectable 25 Gram(s) IV Push once  dextrose 50% Injectable 25 Gram(s) IV Push once  heparin  Injectable 5000 Unit(s) SubCutaneous every 12 hours  hydrALAZINE 50 milliGRAM(s) Oral daily  hydrALAZINE 100 milliGRAM(s) Oral <User Schedule>  insulin glargine Injectable (LANTUS) 11 Unit(s) SubCutaneous at bedtime  insulin lispro (HumaLOG) corrective regimen sliding scale   SubCutaneous <User Schedule>  insulin lispro (HumaLOG) corrective regimen sliding scale   SubCutaneous three times a day before meals  insulin lispro (HumaLOG) corrective regimen sliding scale   SubCutaneous at bedtime  insulin lispro Injectable (HumaLOG) 4 Unit(s) SubCutaneous three times a day before meals  lisinopril 40 milliGRAM(s) Oral daily  metoprolol succinate ER 50 milliGRAM(s) Oral daily  pantoprazole    Tablet 40 milliGRAM(s) Oral before breakfast  sevelamer carbonate 800 milliGRAM(s) Oral three times a day with meals    MEDICATIONS  (PRN):  acetaminophen   Tablet .. 650 milliGRAM(s) Oral every 6 hours PRN Mild Pain (1 - 3), Moderate Pain (4 - 6)  dextrose 40% Gel 15 Gram(s) Oral once PRN Blood Glucose LESS THAN 70 milliGRAM(s)/deciliter  glucagon  Injectable 1 milliGRAM(s) IntraMuscular once PRN Glucose LESS THAN 70 milligrams/deciliter  insulin lispro Injectable (HumaLOG) 3 Unit(s) SubCutaneous at bedtime PRN Give if patient has bedtime snack  oxycodone    5 mG/acetaminophen 325 mG 1 Tablet(s) Oral every 8 hours PRN Severe Pain (7 - 10)      Vital Signs Last 24 Hrs  T(C): 36.7 (05 Dec 2019 16:11), Max: 36.8 (05 Dec 2019 02:24)  T(F): 98 (05 Dec 2019 16:11), Max: 98.2 (05 Dec 2019 02:24)  HR: 77 (05 Dec 2019 17:27) (77 - 102)  BP: 138/78 (05 Dec 2019 17:27) (111/60 - 146/80)  BP(mean): --  RR: 18 (05 Dec 2019 17:27) (18 - 18)  SpO2: 100% (05 Dec 2019 17:27) (98% - 100%)    PHYSICAL EXAM:  GENERAL: NAD, well-developed  HEAD:  Atraumatic, Normocephalic  EYES: EOMI, PERRLA, conjunctiva and sclera clear  NECK: Supple, No JVD  CHEST/LUNG: Clear to auscultation bilaterally; No wheeze  HEART: Regular rate and rhythm; No murmurs, rubs, or gallops  ABDOMEN: Soft, Nontender, Nondistended; Bowel sounds present  EXTREMITIES:  2+ L leg edema  PSYCH: AAOx3  NEUROLOGY: non-focal  SKIN: No rashes or lesions    LABS:                        8.3    5.02  )-----------( 181      ( 05 Dec 2019 06:17 )             26.1     12-05    130<L>  |  90<L>  |  28<H>  ----------------------------<  547<HH>  5.3   |  23  |  4.35<H>    Ca    9.2      05 Dec 2019 09:06  Phos  7.0     12-04  Mg     2.1     12-05    TPro  7.0  /  Alb  4.0  /  TBili  0.3  /  DBili  x   /  AST  15  /  ALT  19  /  AlkPhos  118  12-05                RADIOLOGY & ADDITIONAL TESTS:    Imaging Personally Reviewed:    Consultant(s) Notes Reviewed:      Care Discussed with Consultants/Other Providers:

## 2019-12-05 NOTE — PROVIDER CONTACT NOTE (OTHER) - ACTION/TREATMENT ORDERED:
Endocrinology consult. NP discontinued NPO & ordered to administer Insulin Lispro Corrective Sliding Scale Lunch Time & Insulin Lispro 4 Units Pre-Lunch now & Hold Insulin Lispro 4 Units Pre-Breakfast

## 2019-12-05 NOTE — PROVIDER CONTACT NOTE (CRITICAL VALUE NOTIFICATION) - ASSESSMENT
Patient's Lab Comprehensive Metabolic Panel Results: Sodium, Serum 129. Potassium, Serum 5.4. Chloride, Serum 86. Anion Gap, Serum 20. Blood Urea Nitrogen, Serum 26. Creatinine, Serum 4.07. Glucose, Serum 596. Patient is asymptomatic. Patient is Alert and Oriented times Four. Patient denies chest pain, discomfort, palpitations, shortness of breath, dizziness and lightheadedness. No signs and symptoms of hyperglycemia noted. Patient denies feeling hot and dry. Patient's Blood Glucose Fingertip Result is 539 and immediate repeat Blood Glucose Fingertip Result is 493. Patient's Heart Rhythm is Normal Sinus Rhythm on the cardiac monitor. Patient's Vital Signs: Temperature 98.1 F Oral, Heart Rate 88, Blood Pressure 126/70, Respiratory Rate 18 and O2 Saturation 99% on O2 2 Liters Nasal Cannula. Patient states that she does not drink water but only soda. Patient educated regarding diabetes.

## 2019-12-05 NOTE — PROGRESS NOTE ADULT - SUBJECTIVE AND OBJECTIVE BOX
Chief Complaint:     History:    MEDICATIONS  (STANDING):  albuterol/ipratropium for Nebulization 3 milliLiter(s) Nebulizer every 6 hours  aspirin enteric coated 81 milliGRAM(s) Oral daily  atorvastatin 20 milliGRAM(s) Oral at bedtime  buDESOnide    Inhalation Suspension 0.5 milliGRAM(s) Inhalation every 12 hours  cefepime   IVPB 1000 milliGRAM(s) IV Intermittent daily  clopidogrel Tablet 75 milliGRAM(s) Oral daily  dextrose 5%. 1000 milliLiter(s) (50 mL/Hr) IV Continuous <Continuous>  dextrose 50% Injectable 12.5 Gram(s) IV Push once  dextrose 50% Injectable 25 Gram(s) IV Push once  dextrose 50% Injectable 25 Gram(s) IV Push once  dextrose 50% Injectable 25 Gram(s) IV Push once  heparin  Injectable 5000 Unit(s) SubCutaneous every 12 hours  hydrALAZINE 50 milliGRAM(s) Oral daily  hydrALAZINE 100 milliGRAM(s) Oral <User Schedule>  insulin glargine Injectable (LANTUS) 10 Unit(s) SubCutaneous at bedtime  insulin lispro (HumaLOG) corrective regimen sliding scale   SubCutaneous <User Schedule>  insulin lispro (HumaLOG) corrective regimen sliding scale   SubCutaneous three times a day before meals  insulin lispro (HumaLOG) corrective regimen sliding scale   SubCutaneous at bedtime  insulin lispro Injectable (HumaLOG) 4 Unit(s) SubCutaneous three times a day before meals  lisinopril 40 milliGRAM(s) Oral daily  metoprolol succinate ER 50 milliGRAM(s) Oral daily  pantoprazole    Tablet 40 milliGRAM(s) Oral before breakfast  sevelamer carbonate 800 milliGRAM(s) Oral three times a day with meals    MEDICATIONS  (PRN):  acetaminophen   Tablet .. 650 milliGRAM(s) Oral every 6 hours PRN Mild Pain (1 - 3), Moderate Pain (4 - 6)  dextrose 40% Gel 15 Gram(s) Oral once PRN Blood Glucose LESS THAN 70 milliGRAM(s)/deciliter  glucagon  Injectable 1 milliGRAM(s) IntraMuscular once PRN Glucose LESS THAN 70 milligrams/deciliter  oxycodone    5 mG/acetaminophen 325 mG 1 Tablet(s) Oral every 12 hours PRN Severe Pain (7 - 10)      Allergies    No Known Allergies    Intolerances      Review of Systems:  Constitutional: No fever  Eyes: No blurry vision  Neuro: No tremors  HEENT: No pain  Cardiovascular: No chest pain, palpitations  Respiratory: No SOB, no cough  GI: No nausea, vomiting, abdominal pain  : No dysuria  Skin: no rash  Psych: no depression  Endocrine: no polyuria, polydipsia  Hem/lymph: no swelling  Osteoporosis: no fractures    ALL OTHER SYSTEMS REVIEWED AND NEGATIVE    UNABLE TO OBTAIN    PHYSICAL EXAM:  VITALS: T(C): 36.7 (12-05-19 @ 10:43)  T(F): 98.1 (12-05-19 @ 10:43), Max: 98.2 (12-05-19 @ 02:24)  HR: 77 (12-05-19 @ 10:43) (73 - 102)  BP: 146/80 (12-05-19 @ 10:43) (111/60 - 146/80)  RR:  (18 - 18)  SpO2:  (98% - 100%)  Wt(kg): --  GENERAL: NAD, well-groomed, well-developed  EYES: No proptosis, no lid lag, anicteric  HEENT:  Atraumatic, Normocephalic, moist mucous membranes  THYROID: Normal size, no palpable nodules  RESPIRATORY: Clear to auscultation bilaterally; No rales, rhonchi, wheezing, or rubs  CARDIOVASCULAR: Regular rate and rhythm; No murmurs; no peripheral edema  GI: Soft, nontender, non distended, normal bowel sounds  SKIN: Dry, intact, No rashes or lesions  MUSCULOSKELETAL: Full range of motion, normal strength  NEURO: sensation intact, extraocular movements intact, no tremor, normal reflexes  PSYCH: Alert and oriented x 3, normal affect, normal mood  CUSHING'S SIGNS: no striae    POCT Blood Glucose.: 262 mg/dL (12-05-19 @ 11:55)  POCT Blood Glucose.: 393 mg/dL (12-05-19 @ 09:44)  POCT Blood Glucose.: 405 mg/dL (12-05-19 @ 09:42)  POCT Blood Glucose.: 511 mg/dL (12-05-19 @ 08:44)  POCT Blood Glucose.: 492 mg/dL (12-05-19 @ 08:42)  POCT Blood Glucose.: 493 mg/dL (12-05-19 @ 07:44)  POCT Blood Glucose.: 539 mg/dL (12-05-19 @ 07:43)  POCT Blood Glucose.: 522 mg/dL (12-05-19 @ 05:45)  POCT Blood Glucose.: 594 mg/dL (12-05-19 @ 03:56)  POCT Blood Glucose.: >600 mg/dL (12-05-19 @ 03:55)  POCT Blood Glucose.: >600 mg/dL (12-05-19 @ 02:17)  POCT Blood Glucose.: >600 mg/dL (12-05-19 @ 02:16)  POCT Blood Glucose.: 439 mg/dL (12-04-19 @ 21:28)  POCT Blood Glucose.: 482 mg/dL (12-04-19 @ 21:27)  POCT Blood Glucose.: 338 mg/dL (12-04-19 @ 19:21)  POCT Blood Glucose.: 208 mg/dL (12-04-19 @ 17:16)  POCT Blood Glucose.: 185 mg/dL (12-04-19 @ 15:36)  POCT Blood Glucose.: 94 mg/dL (12-04-19 @ 14:42)  POCT Blood Glucose.: 81 mg/dL (12-04-19 @ 13:52)  POCT Blood Glucose.: 81 mg/dL (12-04-19 @ 13:49)  POCT Blood Glucose.: 180 mg/dL (12-04-19 @ 12:56)  POCT Blood Glucose.: 53 mg/dL (12-04-19 @ 12:32)  POCT Blood Glucose.: 105 mg/dL (12-04-19 @ 10:52)  POCT Blood Glucose.: 472 mg/dL (12-04-19 @ 08:58)  POCT Blood Glucose.: 515 mg/dL (12-04-19 @ 08:54)  POCT Blood Glucose.: 571 mg/dL (12-04-19 @ 08:25)  POCT Blood Glucose.: 533 mg/dL (12-04-19 @ 08:23)  POCT Blood Glucose.: 513 mg/dL (12-04-19 @ 06:52)  POCT Blood Glucose.: 593 mg/dL (12-04-19 @ 06:50)  POCT Blood Glucose.: 251 mg/dL (12-03-19 @ 21:07)  POCT Blood Glucose.: 140 mg/dL (12-03-19 @ 17:28)  POCT Blood Glucose.: 186 mg/dL (12-03-19 @ 13:13)  POCT Blood Glucose.: 182 mg/dL (12-03-19 @ 08:37)  POCT Blood Glucose.: 175 mg/dL (12-02-19 @ 23:11)  POCT Blood Glucose.: 127 mg/dL (12-02-19 @ 17:43)  POCT Blood Glucose.: 86 mg/dL (12-02-19 @ 16:34)      12-05    130<L>  |  90<L>  |  28<H>  ----------------------------<  547<HH>  5.3   |  23  |  4.35<H>    EGFR if : 12<L>  EGFR if non : 10<L>    Ca    9.2      12-05  Mg     2.1     12-05  Phos  7.0     12-04    TPro  7.0  /  Alb  4.0  /  TBili  0.3  /  DBili  x   /  AST  15  /  ALT  19  /  AlkPhos  118  12-05          Thyroid Function Tests:  11-14 @ 09:15 TSH 1.78 FreeT4 -- T3 -- Anti TPO -- Anti Thyroglobulin Ab -- TSI --      Hemoglobin A1C, Whole Blood: 8.7 % <H> [4.0 - 5.6] (11-13-19 @ 23:44)  Hemoglobin A1C, Whole Blood: 8.1 % <H> [4.0 - 5.6] (09-16-19 @ 08:04) Chief Complaint: Type 1 DM    History: Patient's glucose levels>600 last night. Had hyperglycemia but was not in DKA. Received Lantus 10 Units at night and NPH 3 Units this AM. Admits to having sheila crackers last night. Patient known to have dietary nonadherence.     MEDICATIONS  (STANDING):  albuterol/ipratropium for Nebulization 3 milliLiter(s) Nebulizer every 6 hours  aspirin enteric coated 81 milliGRAM(s) Oral daily  atorvastatin 20 milliGRAM(s) Oral at bedtime  buDESOnide    Inhalation Suspension 0.5 milliGRAM(s) Inhalation every 12 hours  cefepime   IVPB 1000 milliGRAM(s) IV Intermittent daily  clopidogrel Tablet 75 milliGRAM(s) Oral daily  dextrose 5%. 1000 milliLiter(s) (50 mL/Hr) IV Continuous <Continuous>  dextrose 50% Injectable 12.5 Gram(s) IV Push once  dextrose 50% Injectable 25 Gram(s) IV Push once  dextrose 50% Injectable 25 Gram(s) IV Push once  dextrose 50% Injectable 25 Gram(s) IV Push once  heparin  Injectable 5000 Unit(s) SubCutaneous every 12 hours  hydrALAZINE 50 milliGRAM(s) Oral daily  hydrALAZINE 100 milliGRAM(s) Oral <User Schedule>  insulin glargine Injectable (LANTUS) 10 Unit(s) SubCutaneous at bedtime  insulin lispro (HumaLOG) corrective regimen sliding scale   SubCutaneous <User Schedule>  insulin lispro (HumaLOG) corrective regimen sliding scale   SubCutaneous three times a day before meals  insulin lispro (HumaLOG) corrective regimen sliding scale   SubCutaneous at bedtime  insulin lispro Injectable (HumaLOG) 4 Unit(s) SubCutaneous three times a day before meals  lisinopril 40 milliGRAM(s) Oral daily  metoprolol succinate ER 50 milliGRAM(s) Oral daily  pantoprazole    Tablet 40 milliGRAM(s) Oral before breakfast  sevelamer carbonate 800 milliGRAM(s) Oral three times a day with meals    MEDICATIONS  (PRN):  acetaminophen   Tablet .. 650 milliGRAM(s) Oral every 6 hours PRN Mild Pain (1 - 3), Moderate Pain (4 - 6)  dextrose 40% Gel 15 Gram(s) Oral once PRN Blood Glucose LESS THAN 70 milliGRAM(s)/deciliter  glucagon  Injectable 1 milliGRAM(s) IntraMuscular once PRN Glucose LESS THAN 70 milligrams/deciliter  oxycodone    5 mG/acetaminophen 325 mG 1 Tablet(s) Oral every 12 hours PRN Severe Pain (7 - 10)      Allergies    No Known Allergies    Intolerances      Review of Systems:  Constitutional: No fever  Eyes: No blurry vision  Neuro: No tremors  HEENT: No pain  Cardiovascular: No chest pain, palpitations  Respiratory: No SOB, no cough  GI: No nausea, vomiting, abdominal pain  : No dysuria  Skin: no rash  Psych: no depression  Endocrine: no polyuria, polydipsia      ALL OTHER SYSTEMS REVIEWED AND NEGATIVE        PHYSICAL EXAM:  VITALS: T(C): 36.7 (12-05-19 @ 10:43)  T(F): 98.1 (12-05-19 @ 10:43), Max: 98.2 (12-05-19 @ 02:24)  HR: 77 (12-05-19 @ 10:43) (73 - 102)  BP: 146/80 (12-05-19 @ 10:43) (111/60 - 146/80)  RR:  (18 - 18)  SpO2:  (98% - 100%)  Wt(kg): --  GENERAL: NAD, well-groomed, well-developed  EYES: No proptosis, no lid lag, anicteric  HEENT:  Atraumatic, Normocephalic, moist mucous membranes  RESPIRATORY: Clear to auscultation bilaterally; No rales, rhonchi, wheezing, or rubs  CARDIOVASCULAR: Regular rate and rhythm; No murmurs  GI: Soft, nontender, non distended, normal bowel sounds        POCT Blood Glucose.: 262 mg/dL (12-05-19 @ 11:55)  POCT Blood Glucose.: 393 mg/dL (12-05-19 @ 09:44)  POCT Blood Glucose.: 405 mg/dL (12-05-19 @ 09:42)  POCT Blood Glucose.: 511 mg/dL (12-05-19 @ 08:44)  POCT Blood Glucose.: 492 mg/dL (12-05-19 @ 08:42)  POCT Blood Glucose.: 493 mg/dL (12-05-19 @ 07:44)  POCT Blood Glucose.: 539 mg/dL (12-05-19 @ 07:43)  POCT Blood Glucose.: 522 mg/dL (12-05-19 @ 05:45)  POCT Blood Glucose.: 594 mg/dL (12-05-19 @ 03:56)  POCT Blood Glucose.: >600 mg/dL (12-05-19 @ 03:55)  POCT Blood Glucose.: >600 mg/dL (12-05-19 @ 02:17)  POCT Blood Glucose.: >600 mg/dL (12-05-19 @ 02:16)  POCT Blood Glucose.: 439 mg/dL (12-04-19 @ 21:28)  POCT Blood Glucose.: 482 mg/dL (12-04-19 @ 21:27)  POCT Blood Glucose.: 338 mg/dL (12-04-19 @ 19:21)  POCT Blood Glucose.: 208 mg/dL (12-04-19 @ 17:16)  POCT Blood Glucose.: 185 mg/dL (12-04-19 @ 15:36)  POCT Blood Glucose.: 94 mg/dL (12-04-19 @ 14:42)  POCT Blood Glucose.: 81 mg/dL (12-04-19 @ 13:52)  POCT Blood Glucose.: 81 mg/dL (12-04-19 @ 13:49)  POCT Blood Glucose.: 180 mg/dL (12-04-19 @ 12:56)  POCT Blood Glucose.: 53 mg/dL (12-04-19 @ 12:32)  POCT Blood Glucose.: 105 mg/dL (12-04-19 @ 10:52)  POCT Blood Glucose.: 472 mg/dL (12-04-19 @ 08:58)  POCT Blood Glucose.: 515 mg/dL (12-04-19 @ 08:54)  POCT Blood Glucose.: 571 mg/dL (12-04-19 @ 08:25)  POCT Blood Glucose.: 533 mg/dL (12-04-19 @ 08:23)  POCT Blood Glucose.: 513 mg/dL (12-04-19 @ 06:52)  POCT Blood Glucose.: 593 mg/dL (12-04-19 @ 06:50)  POCT Blood Glucose.: 251 mg/dL (12-03-19 @ 21:07)  POCT Blood Glucose.: 140 mg/dL (12-03-19 @ 17:28)  POCT Blood Glucose.: 186 mg/dL (12-03-19 @ 13:13)  POCT Blood Glucose.: 182 mg/dL (12-03-19 @ 08:37)  POCT Blood Glucose.: 175 mg/dL (12-02-19 @ 23:11)  POCT Blood Glucose.: 127 mg/dL (12-02-19 @ 17:43)  POCT Blood Glucose.: 86 mg/dL (12-02-19 @ 16:34)      12-05    130<L>  |  90<L>  |  28<H>  ----------------------------<  547<HH>  5.3   |  23  |  4.35<H>    EGFR if : 12<L>  EGFR if non : 10<L>    Ca    9.2      12-05  Mg     2.1     12-05  Phos  7.0     12-04    TPro  7.0  /  Alb  4.0  /  TBili  0.3  /  DBili  x   /  AST  15  /  ALT  19  /  AlkPhos  118  12-05          Thyroid Function Tests:  11-14 @ 09:15 TSH 1.78 FreeT4 -- T3 -- Anti TPO -- Anti Thyroglobulin Ab -- TSI --      Hemoglobin A1C, Whole Blood: 8.7 % <H> [4.0 - 5.6] (11-13-19 @ 23:44)  Hemoglobin A1C, Whole Blood: 8.1 % <H> [4.0 - 5.6] (09-16-19 @ 08:04)

## 2019-12-05 NOTE — PROVIDER CONTACT NOTE (OTHER) - SITUATION
Patient had Premature Atrial Tachycardia on the cardiac monitor for 4.8 seconds and Heart Rate increased to 120's on the cardiac monitor at that time.

## 2019-12-05 NOTE — PROGRESS NOTE ADULT - SUBJECTIVE AND OBJECTIVE BOX
PULMONARY PROGRESS NOTE    NATHAN MEEKS  MRN-[id number]    Patient is a 64y old  Female who presents with a chief complaint of shortness of breath (02 Dec 2019 07:20)      HPI:  feeling better overall    ROS:   neg    MEDICATIONS  (STANDING):  albuterol/ipratropium for Nebulization 3 milliLiter(s) Nebulizer every 6 hours  aspirin enteric coated 81 milliGRAM(s) Oral daily  atorvastatin 20 milliGRAM(s) Oral at bedtime  buDESOnide    Inhalation Suspension 0.5 milliGRAM(s) Inhalation every 12 hours  cefepime   IVPB 1000 milliGRAM(s) IV Intermittent daily  clopidogrel Tablet 75 milliGRAM(s) Oral daily  dextrose 5%. 1000 milliLiter(s) (50 mL/Hr) IV Continuous <Continuous>  dextrose 50% Injectable 12.5 Gram(s) IV Push once  dextrose 50% Injectable 25 Gram(s) IV Push once  dextrose 50% Injectable 25 Gram(s) IV Push once  dextrose 50% Injectable 25 Gram(s) IV Push once  heparin  Injectable 5000 Unit(s) SubCutaneous every 12 hours  hydrALAZINE 50 milliGRAM(s) Oral daily  hydrALAZINE 100 milliGRAM(s) Oral <User Schedule>  insulin glargine Injectable (LANTUS) 10 Unit(s) SubCutaneous at bedtime  insulin lispro (HumaLOG) corrective regimen sliding scale   SubCutaneous <User Schedule>  insulin lispro (HumaLOG) corrective regimen sliding scale   SubCutaneous three times a day before meals  insulin lispro (HumaLOG) corrective regimen sliding scale   SubCutaneous at bedtime  insulin lispro Injectable (HumaLOG) 4 Unit(s) SubCutaneous three times a day before meals  lisinopril 40 milliGRAM(s) Oral daily  metoprolol succinate ER 50 milliGRAM(s) Oral daily  pantoprazole    Tablet 40 milliGRAM(s) Oral before breakfast  sevelamer carbonate 800 milliGRAM(s) Oral three times a day with meals    MEDICATIONS  (PRN):  acetaminophen   Tablet .. 650 milliGRAM(s) Oral every 6 hours PRN Mild Pain (1 - 3), Moderate Pain (4 - 6)  dextrose 40% Gel 15 Gram(s) Oral once PRN Blood Glucose LESS THAN 70 milliGRAM(s)/deciliter  glucagon  Injectable 1 milliGRAM(s) IntraMuscular once PRN Glucose LESS THAN 70 milligrams/deciliter  oxycodone    5 mG/acetaminophen 325 mG 1 Tablet(s) Oral every 12 hours PRN Severe Pain (7 - 10)            EXAM:  Vital Signs Last 24 Hrs  T(C): 36.7 (05 Dec 2019 07:45), Max: 36.8 (05 Dec 2019 02:24)  T(F): 98.1 (05 Dec 2019 07:45), Max: 98.2 (05 Dec 2019 02:24)  HR: 88 (05 Dec 2019 07:45) (67 - 102)  BP: 126/70 (05 Dec 2019 07:45) (111/60 - 131/74)  BP(mean): --  RR: 18 (05 Dec 2019 07:45) (18 - 18)  SpO2: 99% (05 Dec 2019 07:45) (98% - 100%)  GENERAL: The patient is awake and alert in no apparent distress.     LUNGS: crackles at right base        LABS/IMAGING: reviewed                                   8.3    5.02  )-----------( 181      ( 05 Dec 2019 06:17 )             26.1   12-05    130<L>  |  90<L>  |  28<H>  ----------------------------<  547<HH>  5.3   |  23  |  4.35<H>    Ca    9.2      05 Dec 2019 09:06  Phos  7.0     12-04  Mg     2.1     12-05    TPro  7.0  /  Alb  4.0  /  TBili  0.3  /  DBili  x   /  AST  15  /  ALT  19  /  AlkPhos  118  12-05        < from: Xray Chest 2 Views PA/Lat (12.01.19 @ 17:35) >  IMPRESSION:   New right lower lobe opacity, likely pneumonia.    < end of copied text >  < from: NM Pulmonary Ventilation/Perfusion Scan (12.01.19 @ 22:58) >  IMPRESSION:     Very low probability of pulmonary embolus.    < end of copied text >    < from: CT Chest No Cont (12.03.19 @ 23:13) >  IMPRESSION:     1.  Bilateral lower lobe consolidations and volume loss may represent   areas of atelectasis, however cannot exclude superimposed infection.    2.  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 Recommend continued follow-ups   or treatment.    < end of copied text >      PROBLEM LIST:  64y Female with HEALTH ISSUES - PROBLEM Dx:  pneumonia  COPD  Diabetes mellitus type 1: Diabetes mellitus type 1  CAD (coronary artery disease): CAD (coronary artery disease)  ESRD (end stage renal disease): ESRD (end stage renal disease)  Chronic systolic congestive heart failure: Chronic systolic congestive heart failure  Thrombocytopenia: Thrombocytopenia  Lower extremity edema: Lower extremity edema    RECS:  -Known to have multiple nodule/lymphadenopathy, EBUS in 3/2019 showed reactive lymph nodes.  Will follow up as outpt to discuss needle biopsy?  -cont abx per ID  -pulmicort bid, add duonebs  -appreciate cards eval  -renal fu      Alem aTte MD   [phone]

## 2019-12-05 NOTE — PROGRESS NOTE ADULT - ATTENDING COMMENTS
Adenike Partida (pager [phone])  On evenings and weekends, please call [phone] or page endocrine fellow on call.   Please note that this patient may be followed by different provider tomorrow. If no answer, contact endocrine fellow on call.

## 2019-12-05 NOTE — PROVIDER CONTACT NOTE (CRITICAL VALUE NOTIFICATION) - ASSESSMENT
Patient's Lab Results: Sodium, Serum 130. Chloride, Serum 90. Blood Urea Nitrogen, Serum 28. Creatinine, Serum 4.35. Glucose, Serum 547. Beta Hydroxy-Butyrate 0.1. Patient is asymptomatic. Patient is Alert and Oriented times Four. Patient denies chest pain, discomfort, palpitations, shortness of breath, dizziness and lightheadedness. No signs and symptoms of hyperglycemia noted. Patient denies feeling hot and dry. Patient's Heart Rhythm is Normal Sinus Rhythm on the cardiac monitor. NPO maintained as ordered. Patient's most recent Blood Glucose Fingertip Results are 405 and then 393.

## 2019-12-05 NOTE — PROVIDER CONTACT NOTE (OTHER) - SITUATION
Patient's Blood Glucose Fingertip Result is 262. Patient has an active order for NPO. Patient requesting to eat her Breakfast now at Lunch Time.

## 2019-12-05 NOTE — PROVIDER CONTACT NOTE (OTHER) - SITUATION
Patient's Blood Glucose Fingertip Result is 492 and immediate repeat Blood Glucose Fingertip Result is 511.

## 2019-12-05 NOTE — PROGRESS NOTE ADULT - SUBJECTIVE AND OBJECTIVE BOX
Cardiovascular Disease Progress Note    Overnight events: No acute events overnight.  leg pain better. no sob. no new cardiac sx   Otherwise review of systems negative    Objective Findings:  T(C): 36.7 (19 @ 07:45), Max: 36.8 (19 @ 08:17)  HR: 88 (19 @ 07:45) (67 - 102)  BP: 126/70 (19 @ 07:45) (111/60 - 133/72)  RR: 18 (19 @ 07:45) (17 - 18)  SpO2: 99% (19 @ 07:45) (95% - 100%)  Wt(kg): --  Daily     Daily Weight in k (04 Dec 2019 12:26)      Physical Exam:  Gen: NAD  HEENT: EOMI  CV: RRR, normal S1 + S2, 2/6 heraclio  Lungs: CTAB  Abd: soft, non-tender  Ext: + edema    Telemetry: nsr    Laboratory Data:                        8.3    5.02  )-----------( 181      ( 05 Dec 2019 06:17 )             26.1     12    129<L>  |  86<L>  |  26<H>  ----------------------------<  596<HH>  5.4<H>   |  23  |  4.07<H>    Ca    9.0      05 Dec 2019 06:17  Phos  7.0     -  Mg     2.1         TPro  6.9  /  Alb  3.9  /  TBili  0.2  /  DBili  x   /  AST  16  /  ALT  20  /  AlkPhos  113  12-05              Inpatient Medications:  MEDICATIONS  (STANDING):  albuterol/ipratropium for Nebulization 3 milliLiter(s) Nebulizer every 6 hours  aspirin enteric coated 81 milliGRAM(s) Oral daily  atorvastatin 20 milliGRAM(s) Oral at bedtime  buDESOnide    Inhalation Suspension 0.5 milliGRAM(s) Inhalation every 12 hours  cefepime   IVPB 1000 milliGRAM(s) IV Intermittent daily  clopidogrel Tablet 75 milliGRAM(s) Oral daily  dextrose 5%. 1000 milliLiter(s) (50 mL/Hr) IV Continuous <Continuous>  dextrose 50% Injectable 12.5 Gram(s) IV Push once  dextrose 50% Injectable 25 Gram(s) IV Push once  dextrose 50% Injectable 25 Gram(s) IV Push once  dextrose 50% Injectable 25 Gram(s) IV Push once  heparin  Injectable 5000 Unit(s) SubCutaneous every 12 hours  hydrALAZINE 50 milliGRAM(s) Oral daily  hydrALAZINE 100 milliGRAM(s) Oral <User Schedule>  insulin glargine Injectable (LANTUS) 10 Unit(s) SubCutaneous at bedtime  insulin lispro (HumaLOG) corrective regimen sliding scale   SubCutaneous <User Schedule>  insulin lispro (HumaLOG) corrective regimen sliding scale   SubCutaneous three times a day before meals  insulin lispro (HumaLOG) corrective regimen sliding scale   SubCutaneous at bedtime  insulin lispro Injectable (HumaLOG) 4 Unit(s) SubCutaneous three times a day before meals  lisinopril 40 milliGRAM(s) Oral daily  metoprolol succinate ER 50 milliGRAM(s) Oral daily  pantoprazole    Tablet 40 milliGRAM(s) Oral before breakfast  sevelamer carbonate 800 milliGRAM(s) Oral three times a day with meals      Assessment:  -volume overload  -SOB  -right lower lobe opacity  -elevated but stable cardiac enzymes (hs trop) with recent admission with relatively preserved ck/ck mb fraction and no obvious ischemic changes on ekg  -hx of NSVT  -pAT  -ischemic cardiomyopathy, cad s/p multiple stents  -esrd on hd  -PAD s/p prior intervention       Recs:  -optimization of volume status and electrolyte abnormalities with hd.   -vq scan and venous duplex neg  -cli sx. known pad. s/p art duplex. vascular eval appreciated. pain control  -new rll opacity --> likely pna. pulm recs appreciated. id consulted. plan for ct chest and cefepime  -known extensive CAD and ICM. s/p C 2019 --> severe and diffuse instent restenosis along RCA --> unable to stent due to multiple layers of stenting and prior brachytherapy. denies any true ischemic sx at present  -c/w beta blockers for nsvt/pat/cad (as above). currently on toprol 50mg, hydral and lisinopril uptitrate GDMT as tolerates. wouldnt inc bb at this time 2/2 hx of bronchospasm  -hx of prolonged qtc. avoid qtc prolonging meds  -s/p TTE 2019: mod-severe MR, pseudo AS (low flow-low gradient), mod-severe LV dysfunction --> recent CTS consult with dr hebert appreciated. plan is for medical management given surgical risk and patient preference  -c/w asa, plavix, statin for ischemic cardiomyopathy/CAD/PAD  -dvt ppx      Over 25 minutes spent on total encounter; more than 50% of the visit was spent counseling and/or coordinating care by the attending physician.      Julián Biswas MD   Cardiovascular Disease  [phone]

## 2019-12-05 NOTE — PROGRESS NOTE ADULT - ASSESSMENT
63 yo woman with CAD (Cath Feb '19 showing 99% RCA, 100% RPLS, 100% Cx) s/p multiple stents/brachytherapy, ESRD (on HD M/T/T/Sa), DM1 c/b neuropathy and retinopathy, CHF (EF 30% per last [facility]), mod MR, severe AS, RHF, TIA, severe PVD s/p b/l fempop bypass c/b left thrombosed graft, left subclavian vein stenosis s/p stent, COPD not on O2, gout, hilar lymphadenopathy of unknown etiology, frequent hospitalizations (usually 1-3 times a month) presented with  SOB of 1 day. had cxr revealing pneumonia.     1- ESRD  2- HTN   3- SHPT  4- Pneumonia    hd am  continue with hydralazine and ace-I  renvela increase to 2 tab with meals  cefepime 1 g iv qd

## 2019-12-05 NOTE — PROVIDER CONTACT NOTE (OTHER) - ASSESSMENT
Patient is asymptomatic. Patient is Alert and Oriented times Four. Patient denies chest pain, discomfort, palpitations, shortness of breath, dizziness and lightheadedness. Patient's Heart Rhythm is Normal Sinus Rhythm on the cardiac monitor. Endocrinology was consulted. Insulin Lantus 10 Units at bedtimes was discontinued and Insulin Lantus 11 Units was ordered and PRN Insulin Lispro 3 Units Subcutaneous ordered for if patient has a bedtime snack.

## 2019-12-05 NOTE — PROGRESS NOTE ADULT - SUBJECTIVE AND OBJECTIVE BOX
[facility]   [phone]  RENAL FOLLOW UP NOTE  --------------------------------------------------------------------------------  Chief Complaint:    24 hour events/subjective:    seen earlier. states cough is better. sob is better     PAST HISTORY  --------------------------------------------------------------------------------  No significant changes to PMH, PSH, FHx, SHx, unless otherwise noted    ALLERGIES & MEDICATIONS  --------------------------------------------------------------------------------  Allergies    No Known Allergies    Intolerances      Standing Inpatient Medications  albuterol/ipratropium for Nebulization 3 milliLiter(s) Nebulizer every 6 hours  aspirin enteric coated 81 milliGRAM(s) Oral daily  atorvastatin 20 milliGRAM(s) Oral at bedtime  buDESOnide    Inhalation Suspension 0.5 milliGRAM(s) Inhalation every 12 hours  cefepime   IVPB 1000 milliGRAM(s) IV Intermittent daily  clopidogrel Tablet 75 milliGRAM(s) Oral daily  dextrose 5%. 1000 milliLiter(s) IV Continuous <Continuous>  dextrose 50% Injectable 12.5 Gram(s) IV Push once  dextrose 50% Injectable 25 Gram(s) IV Push once  dextrose 50% Injectable 25 Gram(s) IV Push once  dextrose 50% Injectable 25 Gram(s) IV Push once  heparin  Injectable 5000 Unit(s) SubCutaneous every 12 hours  hydrALAZINE 50 milliGRAM(s) Oral daily  hydrALAZINE 100 milliGRAM(s) Oral <User Schedule>  insulin glargine Injectable (LANTUS) 11 Unit(s) SubCutaneous at bedtime  insulin lispro (HumaLOG) corrective regimen sliding scale   SubCutaneous <User Schedule>  insulin lispro (HumaLOG) corrective regimen sliding scale   SubCutaneous three times a day before meals  insulin lispro (HumaLOG) corrective regimen sliding scale   SubCutaneous at bedtime  insulin lispro Injectable (HumaLOG) 4 Unit(s) SubCutaneous three times a day before meals  lisinopril 40 milliGRAM(s) Oral daily  metoprolol succinate ER 50 milliGRAM(s) Oral daily  pantoprazole    Tablet 40 milliGRAM(s) Oral before breakfast  sevelamer carbonate 800 milliGRAM(s) Oral three times a day with meals    PRN Inpatient Medications  acetaminophen   Tablet .. 650 milliGRAM(s) Oral every 6 hours PRN  dextrose 40% Gel 15 Gram(s) Oral once PRN  glucagon  Injectable 1 milliGRAM(s) IntraMuscular once PRN  insulin lispro Injectable (HumaLOG) 3 Unit(s) SubCutaneous at bedtime PRN  oxycodone    5 mG/acetaminophen 325 mG 1 Tablet(s) Oral every 8 hours PRN      REVIEW OF SYSTEMS  --------------------------------------------------------------------------------    Gen: denies  fevers/chills,  CVS: denies chest pain/palpitations  Resp: denies SOB/Cough  GI: Denies N/V/Abd pain  : Denies dysuria    All other systems were reviewed and are negative, except as noted.    VITALS/PHYSICAL EXAM  --------------------------------------------------------------------------------  T(C): 36.7 (12-05-19 @ 16:11), Max: 36.8 (12-05-19 @ 02:24)  HR: 77 (12-05-19 @ 17:27) (77 - 102)  BP: 138/78 (12-05-19 @ 17:27) (111/60 - 146/80)  RR: 18 (12-05-19 @ 17:27) (18 - 18)  SpO2: 100% (12-05-19 @ 17:27) (98% - 100%)  Wt(kg): --        12-04-19 @ 07:01  -  12-05-19 @ 07:00  --------------------------------------------------------  IN: 280 mL / OUT: 2000 mL / NET: -1720 mL    12-05-19 @ 07:01  -  12-05-19 @ 17:34  --------------------------------------------------------  IN: 470 mL / OUT: 0 mL / NET: 470 mL      Physical Exam:  	  	+ JVD  	Pulm: decrease bs  no wheezing coarse bs bases  	CV: RRR, S1S2; no rub  	Back: No CVA tenderness  	Abd: +BS, soft, nontender/nondistended  	: No suprapubic tenderness  	UE: Warm, no cyanosis  no clubbing,  no edema; no asterixis  	LE: Warm, no cyanosis  no clubbing, LLE 2+  edema no erythema but warm left leg as well as tender first metatarsal and ankle/leg area   	Neuro: alert and oriented. speech coherent     	    LABS/STUDIES  --------------------------------------------------------------------------------              8.3    5.02  >-----------<  181      [12-05-19 @ 06:17]              26.1     130  |  90  |  28  ----------------------------<  547      [12-05-19 @ 09:06]  5.3   |  23  |  4.35        Ca     9.2     [12-05-19 @ 09:06]      Mg     2.1     [12-05-19 @ 02:54]      Phos  7.0     [12-04-19 @ 05:55]    TPro  7.0  /  Alb  4.0  /  TBili  0.3  /  DBili  x   /  AST  15  /  ALT  19  /  AlkPhos  118  [12-05-19 @ 09:06]          Creatinine Trend:  SCr 4.35 [12-05 @ 09:06]  SCr 4.07 [12-05 @ 06:17]  SCr 2.62 [12-04 @ 14:28]  SCr 5.56 [12-04 @ 07:27]  SCr 5.48 [12-04 @ 05:55]                  Iron 67, TIBC 234, %sat 29      [12-04-19 @ 08:38]  Ferritin 1021      [12-04-19 @ 08:40]  PTH -- (Ca 8.3)      [12-04-19 @ 08:38]   952  PTH -- (Ca 9.6)      [06-17-19 @ 18:06]   177  PTH -- (Ca 7.8)      [03-29-19 @ 20:38]   73  PTH -- (Ca 7.3)      [02-22-19 @ 02:49]   59  HbA1c 8.7      [11-13-19 @ 23:44]  TSH 1.78      [11-14-19 @ 09:15]  Lipid: chol 108, TG 76, HDL 57, LDL 36      [11-14-19 @ 09:16]

## 2019-12-05 NOTE — PROGRESS NOTE ADULT - ASSESSMENT
· Assessment		  65 yo woman with PMH of CAD, DM1 c/b neuropathy and retinopathy, CHF (EF 30%), mod MR, severe AS, RHF, TIA, severe PVD s/p b/l fempop bypass c/b left thrombosed graft, left subclavian vein stenosis s/p stent, COPD not on O2, gout, hilar lymphadenopathy of unknown etiology, frequent hospitalizations (usually 1-3 times a month) p/w SOB, cough, LLE pain found with possible RLL PNA and LLE cellulitis    Shortness of breath.   - Right lower lung opacity: possible PNA.  - continue antibiotics. ID follow   - Pulmonary evaluation noted  -Check ambulatory saturation off supplemental O2.     Lower extremity edema.   - LE doppler negative for DVT  - Reference: # [id number] Last prescribed Percocet 5/325 in October 2019  - Percocet prn for pain O/N.   - Vascular evaluation noted    Thrombocytopenia.   - Thrombocytopenia on labs. Unclear if spurious result or true.  - Follow CBC with T&S     Chronic systolic congestive heart failure.  - Appears euvolemic.  - Continue with Lisinopril.   - Cardiology follow.    ESRD (end stage renal disease).   - Renal follow Dr. Schmidt  - HD    CAD (coronary artery disease).   -  Troponin elevated likely in setting of ESRD. Similar level to prior labs  - EKG unchanged  - Continue Hydralazine  - Per patient no longer takes Isosorbide Dinitrate   - Cardiology follow.    Diabetes mellitus type 1.  - Compliant with medications  - Continue with Humalog 3U TID premeal  - Continue with corrective SSI  - Continue with Lantus (will start with 8U tonight) as patient with latest glucose of 134.   - Endocrine follow.    Prophylactic measure.  -  DVT ppx: Hold Pharmacologic DVT in setting of new thrombocytopenia. SCDs    Fall risk protocol.   Pierce Viera MD pager [phone]

## 2019-12-05 NOTE — PROVIDER CONTACT NOTE (CRITICAL VALUE NOTIFICATION) - RECOMMENDATIONS
Assess patient. Review patient's orders, lab results, history & plan. Educate patient. Address patient's Blood Glucose Level.

## 2019-12-05 NOTE — PROGRESS NOTE ADULT - SUBJECTIVE AND OBJECTIVE BOX
CC: f/u for  pneumonia  Patient reports  left leg is always swollen  REVIEW OF SYSTEMS:  All other review of systems negative (Comprehensive ROS)    Antimicrobials Day #  :5/7  cefepime   IVPB 1000 milliGRAM(s) IV Intermittent daily    Other Medications Reviewed    T(F): 98 (12-05-19 @ 16:11), Max: 98.2 (12-05-19 @ 02:24)  HR: 78 (12-05-19 @ 16:11)  BP: 138/76 (12-05-19 @ 16:11)  RR: 18 (12-05-19 @ 16:11)  SpO2: 100% (12-05-19 @ 16:11)  Wt(kg): --    PHYSICAL EXAM:  General: alert, no acute distress  Eyes:  anicteric, no conjunctival injection, no discharge  Oropharynx: no lesions or injection 	  Neck: supple, without adenopathy  Lungs: bibasilar rales  to auscultation  Heart: regular rate and rhythm; 2/6 sys m  Abdomen: soft, nondistended, nontender, without mass or organomegaly  Skin: no lesions  Extremities: left leg swollen, no tenderness, no redness, no open wound. right leg no  edema  Neurologic: alert, oriented, moves all extremities    LAB RESULTS:                        8.3    5.02  )-----------( 181      ( 05 Dec 2019 06:17 )             26.1     12-05    130<L>  |  90<L>  |  28<H>  ----------------------------<  547<HH>  5.3   |  23  |  4.35<H>    Ca    9.2      05 Dec 2019 09:06  Phos  7.0     12-04  Mg     2.1     12-05    TPro  7.0  /  Alb  4.0  /  TBili  0.3  /  DBili  x   /  AST  15  /  ALT  19  /  AlkPhos  118  12-05    LIVER FUNCTIONS - ( 05 Dec 2019 09:06 )  Alb: 4.0 g/dL / Pro: 7.0 g/dL / ALK PHOS: 118 U/L / ALT: 19 U/L / AST: 15 U/L / GGT: x           RADIOLOGY REVIEWED:  < from: CT Chest No Cont (12.03.19 @ 23:13) >    EXAM:  CT CHEST                            PROCEDURE DATE:  12/03/2019            INTERPRETATION:  CLINICAL INFORMATION: Shortness of breath. End-stage   renal disease. Evaluate for pneumonia.    COMPARISON: CT chest 11/13/2019--4/6/2015.    PROCEDURE:   CT of the Chest was performed without intravenous contrast.  Sagittal and coronal reformats were performed.      FINDINGS:     AIRWAYS, LUNGS, PLEURA: Patent central airways. No bronchial wall   thickening or bronchiectasis. Emphysema. Bibasilar consolidative   opacities and volume loss.    Interlobular septal thickening within the lower lungs.    Previously seen 1.3 cm right upper lobe and 1.1 cm left upper lobe   groundglass nodules (series 3, image 31) are unchanged since 9/21/2019   butincreased in size from 2015.    0.9 cm groundglass nodule within the right middle lobe (series 3 image   93) is also slightly enlarged and denser compare to 2015.    Trace bilateral pleural effusions. No pneumothorax.    MEDIASTINUM, LYMPH NODES: Enlarged mediastinal and hilar lymph nodes are   grossly unchanged from 2017. A right lower paratracheal lymph node   measures 2.5-1.6 cm (series 3, image 52).    HEART AND VASCULATURE: Cardiomegaly. No pericardial effusion. Coronary   artery calcifications/stents. Aortic valve and mitral annular   calcifications. Thoracic aorta and pulmonary artery normal in course and   caliber. Aortic atherosclerotic calcification. Left subclavian vein stent.    UPPER ABDOMEN: Partially visualized atrophic left kidney. Splenic   calcified granulomas..    BONES AND SOFT TISSUES: No aggressive osseous lesion. Unchanged right   posterior seventh rib fracture deformity. Mild diffuse sclerotic changes   of the osseous structures compatible with renal osteodystrophy..    LOWER NECK: Within normal limits.    IMPRESSION:     1.  Bilateral lower lobe consolidations and volume loss may represent   areas of atelectasis, however cannot exclude superimposed infection.    2.  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 Recommend continued follow-ups   or treatment.      < end of copied text >        Assessment:  Patient with pvd, lle bypass with chronic swelling, copd, dm, as admitted for sob , found to have bibasilar pneumonia and dka  Plan:  2 more days of cefepime  dka, glycemic control per endocrine

## 2019-12-05 NOTE — PROVIDER CONTACT NOTE (OTHER) - SITUATION
Patient's Blood Glucose Fingertip Result is 182. Patient ate her Breakfast at Lunch Time. Patient requesting to eat her Lunch meal now.

## 2019-12-05 NOTE — PROVIDER CONTACT NOTE (OTHER) - ACTION/TREATMENT ORDERED:
NP aware & assessed patient & reviewed all lab results, blood glucose results and orders. Endocrinology consulted. NP ordered to await for STAT Lab Results & no new orders at this time.

## 2019-12-05 NOTE — PROVIDER CONTACT NOTE (CRITICAL VALUE NOTIFICATION) - BACKGROUND
Patient admitted for Dyspnea, Diabetes Type 1, Coronary Artery Disease, End Stage Renal Disease on Hemodialysis, Heart Failure, Lower Extremity Edema.

## 2019-12-05 NOTE — PROVIDER CONTACT NOTE (OTHER) - ACTION/TREATMENT ORDERED:
NP aware & assessed patient & reviewed all lab results, blood glucose results and orders. Endocrinology consulted. NP ordered to re-assess patient's blood glucose fingertip in one hour.

## 2019-12-05 NOTE — PROVIDER CONTACT NOTE (OTHER) - ACTION/TREATMENT ORDERED:
NP aware and assessed patient & reviewed patient's orders, lab results, history & plan. No new orders per NP at this time.

## 2019-12-05 NOTE — PROGRESS NOTE ADULT - ASSESSMENT
63 yo woman with CAD (Cath Feb '19 showing 99% RCA, 100% RPLS, 100% Cx) s/p multiple stents/brachytherapy, ESRD (on HD M/T/T/Sa), brittle DM1 c/b neuropathy and retinopathy, with hx of DKA, known to the endocrine service, CHF (EF 30% per last [facility]), mod MR, severe AS, RHF, TIA, severe PVD s/p b/l fempop bypass c/b left thrombosed graft, left subclavian vein stenosis s/p stent, COPD not on O2, gout, hilar lymphadenopathy of unknown etiology, frequent hospitalizations (usually 1-3 times a month) presenting with  SOB of 1 day.     endocrine called for DM assistance today after marked hyperglycemia inpt   pt with glucose to the 500s this am with concern for DKA (bicarb 22, PH 7.32, AG 19, BHOB 2)  less likely with overt DKA this am, especially with Lantus on board.   Hyperglycemia likely due to non compliance with diet and snacking.    pt received 13 units of regular insulin today with a hypoglycemia event post HD. pt remains in the 90s despite no premeal coverage and eating BF and lunch.  Please avoid insulin stacking and high doses of insulin as the pt is a brittle type 1DM and very insulin sensitive. Please monitor glucose closely as pt may be at risk for hypoglycemia. If hypoglycemia ensues inform endocrine.   May need D10 drip until glucose stabilizes in 140-180X2, please inform renal if D10 is started due to fluid volume.     #Diabetes:   check [facility]SB[facility] TID qac and hs and 3am  carb consistent diet   home dose is Lantus 12, has been receiving Lantus 8 inpt.   - Lantus  10u qhs (may need to increase to 12 tomorrow based on am glucose)  -Humalog 4-4-4 with meals   -low dose SS TIDAC/qhs  -low dose bedtime scale  -custom low dose 3am scale for hyperglycemia       inform endocrine of hypoglycemia or persistent hyperglycemia episodes as changes in pts insulin regimen will need to be made.   notify endocrine if any plans to be NPO/diet changes as this will also affect insulin regimen.    DC: basl bolus. final doses TBD  -Upon discharge pt likely to continue preadmission insulin doses of L12/H4. Family adjust insulin accordingly at home   -Pt to f/u with Dr Ley,  pvt endo as out pt.      #HLD  on lipitor 20mg 65 yo woman with CAD (Cath Feb '19 showing 99% RCA, 100% RPLS, 100% Cx) s/p multiple stents/brachytherapy, ESRD (on HD M/T/T/Sa), brittle DM1 c/b neuropathy and retinopathy, with hx of DKA, known to the endocrine service, CHF (EF 30% per last [facility]), mod MR, severe AS, RHF, TIA, severe PVD s/p b/l fempop bypass c/b left thrombosed graft, left subclavian vein stenosis s/p stent, COPD not on O2, gout, hilar lymphadenopathy of unknown etiology, frequent hospitalizations (usually 1-3 times a month) presenting with SOB.

## 2019-12-05 NOTE — CHART NOTE - NSCHARTNOTEFT_GEN_A_CORE
Chart/Event Note  [facility] 3DSU 351 W1  NATHAN MEEKS, 64y, Female  [id number]    Reason for Notification:   Notified by RN that patient's blood glucose reading was "high."    Events/History of Present Illness:  Patient's chart reviewed. Patient has a known history of brittle Type 1 DM, had a previous episode of hyper, followed by hypoglycemia post insulin correction on 12/4. Patient is being followed by Endocrinology teams who ordered a 0200 blood glucose reading with sliding scale Humalog correction. RN performed test and reading returned "high," indicating a blood glucose above 600, twice.   Went to bedside with nursing staff to evaluate patient. Patient is awake and pacing in room during assessment. Patient A+Ox4, upset appearing, but in no acute distress. Patient states that she is very upset about her high blood sugar reading, and additionally is complaining of chronic left foot pain, but otherwise denies any new     T(C): 36.8 (12-05-19 @ 02:24), Max: 36.8 (12-04-19 @ 07:00)  HR: 102 (12-05-19 @ 02:24) (67 - 102)  BP: 111/60 (12-05-19 @ 02:24) (111/60 - 133/72)  RR: 18 (12-05-19 @ 02:24) (17 - 18)  SpO2: 99% (12-05-19 @ 02:24) (93% - 100%)    Medical Decision Making/Assessment/Plan:  64y-year-old Female with a past medical history of ___ , admitted for ____ , now with _____ .     - Diagnosis:     1)  2)  3)  4)   5) Will endorse the above diagnostics and findings to the day medicine team for review and follow up. Will additionally sign out to day medicine teams to follow with relevant specialties.     Guicho Gant NP  Department of Medicine   # Chart/Event Note  [facility] 3DSU 351 W1  NATHAN MEEKS, 64y, Female  [id number]    Reason for Notification:   Notified by RN that patient's blood glucose reading was "high."    Events/History of Present Illness:  Patient's chart reviewed. Patient has a known history of brittle Type 1 DM, had a previous episode of hyper, followed by hypoglycemia post insulin correction on 12/4. Due to this episode of hypoglycemia patient's insulin was adjusted by Endocrine teams. Patient is being followed by Endocrinology Dr. Christian who ordered a 0200 blood glucose reading with sliding scale Humalog correction. RN performed test and reading returned "high," indicating a blood glucose above 600, twice. Previously this evening, patient received QHS correction insulin of Humalog 4 units, as well as 10 units of Lantus (~2100 12/4).   RN notes that patient has been non-adherent with prescribed diet and has been snacking throughout night. Beto crackers and sugar packets were noted at bedside during assessment. Additionally, patient has been receiving Zosyn infusions in D5.   Went to bedside with nursing staff to evaluate patient. Patient is awake and pacing in room during assessment. Patient A+Ox4, upset appearing, but in no acute distress. Patient states that she is very upset about her high blood sugar reading, and additionally is complaining of chronic left foot pain, but otherwise denies any new complaints. Patient denies headache, dizziness, weakness, pre-syncope, vision changes, chest pain, palpitations, shortness of breath, abdominal pain, nausea, vomiting, constipation, diarrhea, urinary symptoms, frequency, or excessive thirst/hunger.     T(C): 36.8 (12-05-19 @ 02:24), Max: 36.8 (12-04-19 @ 07:00)  HR: 102 (12-05-19 @ 02:24) (67 - 102)  BP: 111/60 (12-05-19 @ 02:24) (111/60 - 133/72)  RR: 18 (12-05-19 @ 02:24) (17 - 18)  SpO2: 99% (12-05-19 @ 02:24) (93% - 100%)    Medical Decision Making/Assessment/Plan:  64-year-old Female with a past medical history of DM, CHF, coronary artery disease, and COPD, admitted for pneumonia, now with hyperglycemia.     - Diagnosis: Hyperglycemia in Type 1 DM   Per Endocrinology note patient is a highly brittle diabetic with high insulin sensitivity. Had previous episodes of hypoglycemia after receiving insulin. As patient has been receiving Lantus and Humalog throughout day, DKA should be less likely. Will obtain stat labs and treat hyperglycemia very cautiously to prevent a hypoglycemic episode.     1) VBG with electrolytes sent immediately to confirm glucose reading. Will repeat CBC, BMP, Magnesium, VBG, and possibly lactate and beta hydroxy depending on results.    2) Provide previously ordered 0200 correction dosing of 3 units. Based on above labs and repeat glucose level, will repeat correction dose.   3) Start Q2 hour blood glucose checks until returns to a more appropriate range.   4) Will discuss above with on-call Endocrinology physician and follow with their recommendations.   5) Will endorse the above diagnostics and findings to the day medicine team for review and follow up. Will additionally sign out to day medicine teams to follow with relevant specialties.     Guicho Gant NP  Department of Medicine   #[id number] Chart/Event Note  [facility] 3DSU 351 W1  NATHAN MEEKS, 64y, Female  [id number]    Reason for Notification:   Notified by RN that patient's blood glucose reading was "high."    Events/History of Present Illness:  Patient's chart reviewed. Patient has a known history of brittle Type 1 DM, had a previous episode of hyper, followed by hypoglycemia post insulin correction on 12/4. Due to this episode of hypoglycemia patient's insulin was adjusted by Endocrine teams. Patient is being followed by Endocrinology Dr. Christian who ordered a 0200 blood glucose reading with sliding scale Humalog correction. RN performed test and reading returned "high," indicating a blood glucose above 600, twice. Previously this evening, patient received QHS correction insulin of Humalog 4 units, as well as 10 units of Lantus (~2100 12/4).   RN notes that patient has been non-adherent with prescribed diet and has been snacking throughout night. Beto crackers and sugar packets were noted at bedside during assessment. Additionally, patient has been receiving Zosyn infusions in D5.   Went to bedside with nursing staff to evaluate patient. Patient is awake and pacing in room during assessment. Patient A+Ox4, upset appearing, but in no acute distress. Patient states that she is very upset about her high blood sugar reading, and additionally is complaining of chronic left foot pain, but otherwise denies any new complaints. Patient denies headache, dizziness, weakness, pre-syncope, vision changes, chest pain, palpitations, shortness of breath, abdominal pain, nausea, vomiting, constipation, diarrhea, urinary symptoms, frequency, or excessive thirst/hunger.     T(C): 36.8 (12-05-19 @ 02:24), Max: 36.8 (12-04-19 @ 07:00)  HR: 102 (12-05-19 @ 02:24) (67 - 102)  BP: 111/60 (12-05-19 @ 02:24) (111/60 - 133/72)  RR: 18 (12-05-19 @ 02:24) (17 - 18)  SpO2: 99% (12-05-19 @ 02:24) (93% - 100%)    Medical Decision Making/Assessment/Plan:  64-year-old Female with a past medical history of DM, ESRD, CHF, coronary artery disease, and COPD, admitted for pneumonia, now with hyperglycemia.     - Diagnosis: Hyperglycemia in Type 1 DM   Per Endocrinology note patient is a highly brittle diabetic with high insulin sensitivity. Had previous episodes of hypoglycemia after receiving insulin. As patient has been receiving Lantus and Humalog throughout day, DKA should be less likely. Will obtain stat labs and treat hyperglycemia very cautiously to prevent a hypoglycemic episode.     1) VBG with electrolytes sent immediately to confirm glucose reading. Will repeat CBC, BMP, Magnesium, VBG, and possibly lactate and beta hydroxy depending on results.    2) Provide previously ordered 0200 correction dosing of 3 units. Based on above labs and repeat glucose level, will repeat correction dose.   3) Start Q2 hour blood glucose checks until returns to a more appropriate range.   4) Will discuss above with on-call Endocrinology physician and follow with their recommendations.   5) Will endorse the above diagnostics and findings to the day medicine team for review and follow up. Will additionally sign out to day medicine teams to follow with relevant specialties.     Guicho Gant NP  Department of Medicine   #[id number]    Addendum 0325: Glucose of 703. Calculated anion gap of 22. Mild hyperkalemia of 5.7 in context of ESRD. Beta hydroxy added on. Due to the elevated glucose and anion gap in a brittle diabetic, will call Endocrine overnight to develop a treatment plan. Chart/Event Note  [facility] 3DSU 351 W1  NATHAN MEEKS, 64y, Female  [id number]    Reason for Notification:   Notified by RN that patient's blood glucose reading was "high."    Events/History of Present Illness:  Patient's chart reviewed. Patient has a known history of brittle Type 1 DM, had a previous episode of hyper, followed by hypoglycemia post insulin correction on 12/4. Due to this episode of hypoglycemia patient's insulin was adjusted by Endocrine teams. Patient is being followed by Endocrinology Dr. Christian who ordered a 0200 blood glucose reading with sliding scale Humalog correction. RN performed test and reading returned "high," indicating a blood glucose above 600, twice. Previously this evening, patient received QHS correction insulin of Humalog 4 units, as well as 10 units of Lantus (~2100 12/4).   RN notes that patient has been non-adherent with prescribed diet and has been snacking throughout night. Beto crackers and sugar packets were noted at bedside during assessment. Additionally, patient has been receiving Zosyn infusions in D5.   Went to bedside with nursing staff to evaluate patient. Patient is awake and pacing in room during assessment. Patient A+Ox4, upset appearing, but in no acute distress. Patient states that she is very upset about her high blood sugar reading, and additionally is complaining of chronic left foot pain, but otherwise denies any new complaints. Patient denies headache, dizziness, weakness, pre-syncope, vision changes, chest pain, palpitations, shortness of breath, abdominal pain, nausea, vomiting, constipation, diarrhea, urinary symptoms, frequency, or excessive thirst/hunger.     T(C): 36.8 (12-05-19 @ 02:24), Max: 36.8 (12-04-19 @ 07:00)  HR: 102 (12-05-19 @ 02:24) (67 - 102)  BP: 111/60 (12-05-19 @ 02:24) (111/60 - 133/72)  RR: 18 (12-05-19 @ 02:24) (17 - 18)  SpO2: 99% (12-05-19 @ 02:24) (93% - 100%)    Medical Decision Making/Assessment/Plan:  64-year-old Female with a past medical history of DM, ESRD, CHF, coronary artery disease, and COPD, admitted for pneumonia, now with hyperglycemia.     - Diagnosis: Hyperglycemia in Type 1 DM   Per Endocrinology note patient is a highly brittle diabetic with high insulin sensitivity. Had previous episodes of hypoglycemia after receiving insulin. As patient has been receiving Lantus and Humalog throughout day, DKA should be less likely. Will obtain stat labs and treat hyperglycemia very cautiously to prevent a hypoglycemic episode.     1) VBG with electrolytes sent immediately to confirm glucose reading. Will repeat CBC, BMP, Magnesium, VBG, and possibly lactate and beta hydroxy depending on results.    2) Provide previously ordered 0200 correction dosing of 3 units. Based on above labs and repeat glucose level, will repeat correction dose.   3) Start Q2 hour blood glucose checks until returns to a more appropriate range.   4) Discussed snacks and sugar packets at bedside with patient. Education performed. Patient agreed to drink water instead of soda tonight and refrain from any more snacks.   4) Will discuss above with on-call Endocrinology physician and follow with their recommendations.   5) Will endorse the above diagnostics and findings to the day medicine team for review and follow up. Will additionally sign out to day medicine teams to follow with relevant specialties.     Guicho Gant NP  Department of Medicine   #[id number]    Addendum 0325: Glucose of 703. Calculated anion gap of 22. Mild hyperkalemia of 5.7 in context of ESRD. Beta hydroxy added on. Due to the elevated glucose and anion gap in a brittle diabetic, will call Endocrine overnight to develop a treatment plan. Chart/Event Note  [facility] 3DSU 351 W1  NATHAN MEEKS, 64y, Female  [id number]    Reason for Notification:   Notified by RN that patient's blood glucose reading was "high."    Events/History of Present Illness:  Patient's chart reviewed. Patient has a known history of brittle Type 1 DM, had a previous episode of hyper, followed by hypoglycemia post insulin correction on 12/4. Due to this episode of hypoglycemia patient's insulin was adjusted by Endocrine teams. Patient is being followed by Endocrinology Dr. Christian who ordered a 0200 blood glucose reading with sliding scale Humalog correction. RN performed test and reading returned "high," indicating a blood glucose above 600, twice. Previously this evening, patient received QHS correction insulin of Humalog 4 units, as well as 10 units of Lantus (~2100 12/4).   RN notes that patient has been non-adherent with prescribed diet and has been snacking throughout night. Beto crackers and sugar packets were noted at bedside during assessment. Additionally, patient has been receiving Zosyn infusions in D5.   Went to bedside with nursing staff to evaluate patient. Patient is awake and pacing in room during assessment. Patient A+Ox4, upset appearing, but in no acute distress. Patient states that she is very upset about her high blood sugar reading, and additionally is complaining of chronic left foot pain, but otherwise denies any new complaints. Patient denies headache, dizziness, weakness, pre-syncope, vision changes, chest pain, palpitations, shortness of breath, abdominal pain, nausea, vomiting, constipation, diarrhea, urinary symptoms, frequency, or excessive thirst/hunger.     T(C): 36.8 (12-05-19 @ 02:24), Max: 36.8 (12-04-19 @ 07:00)  HR: 102 (12-05-19 @ 02:24) (67 - 102)  BP: 111/60 (12-05-19 @ 02:24) (111/60 - 133/72)  RR: 18 (12-05-19 @ 02:24) (17 - 18)  SpO2: 99% (12-05-19 @ 02:24) (93% - 100%)    Medical Decision Making/Assessment/Plan:  64-year-old Female with a past medical history of DM, ESRD, CHF, coronary artery disease, and COPD, admitted for pneumonia, now with hyperglycemia.     - Diagnosis: Hyperglycemia in Type 1 DM   Per Endocrinology note patient is a highly brittle diabetic with high insulin sensitivity. Had previous episodes of hypoglycemia after receiving insulin. As patient has been receiving Lantus and Humalog throughout day, DKA should be less likely. Will obtain stat labs and treat hyperglycemia very cautiously to prevent a hypoglycemic episode.     1) VBG with electrolytes sent immediately to confirm glucose reading. Will repeat CBC, BMP, Magnesium, VBG, and possibly lactate and beta hydroxy depending on results.    2) Provide previously ordered 0200 correction dosing of 3 units. Based on above labs and repeat glucose level, will repeat correction dose.   3) Start Q2 hour blood glucose checks until returns to a more appropriate range.   4) Discussed snacks and sugar packets at bedside with patient. Education performed. Patient agreed to drink water instead of soda tonight and refrain from any more snacks.   4) Will discuss above with on-call Endocrinology physician and follow with their recommendations.   5) Will endorse the above diagnostics and findings to the day medicine team for review and follow up. Will additionally sign out to day medicine teams to follow with relevant specialties.     Guicho Gant NP  Department of Medicine   #[id number]    Addendum 0325: Glucose of 703. Calculated anion gap of 22. Mild hyperkalemia of 5.7 in context of ESRD. Beta hydroxy added on. Due to the elevated glucose and anion gap in a brittle diabetic, will call Endocrine overnight to develop a treatment plan.    Addendum 0430: Glucose down to 594 post Humalog. Case reviewed with Endocrinology fellow Dr. Alexandra who recommended repeating the 3 units Humalog now, then following with am labs to monitor for anion gap closure. Beta hydroxy returned at 2. No acidosis seen on VBG. Will repeat glucose around 0600 and follow with am labs. Chart/Event Note  [facility] [address]DSU [address]  NATHAN MEEKS, 64y, Female  [id number]    Reason for Notification:   Notified by RN that patient's blood glucose reading was "high."    Events/History of Present Illness:  Patient's chart reviewed. Patient has a known history of brittle Type 1 DM, had a previous episode of hyper, followed by hypoglycemia post insulin correction on 12/4. Due to this episode of hypoglycemia patient's insulin was adjusted by Endocrine teams. Patient is being followed by Endocrinology Dr. Christian who ordered a 0200 blood glucose reading with sliding scale Humalog correction. RN performed test and reading returned "high," indicating a blood glucose above 600, twice. Previously this evening, patient received QHS correction insulin of Humalog 4 units, as well as 10 units of Lantus (~2100 12/4).   RN notes that patient has been non-adherent with prescribed diet and has been snacking throughout night. Beto crackers and sugar packets were noted at bedside during assessment. Additionally, patient has been receiving Zosyn infusions in D5.   Went to bedside with nursing staff to evaluate patient. Patient is awake and pacing in room during assessment. Patient A+Ox4, upset appearing, but in no acute distress. Patient states that she is very upset about her high blood sugar reading, and additionally is complaining of chronic left foot pain, but otherwise denies any new complaints. Patient denies headache, dizziness, weakness, pre-syncope, vision changes, chest pain, palpitations, shortness of breath, abdominal pain, nausea, vomiting, constipation, diarrhea, urinary symptoms, frequency, or excessive thirst/hunger.     T(C): 36.8 (12-05-19 @ 02:24), Max: 36.8 (12-04-19 @ 07:00)  HR: 102 (12-05-19 @ 02:24) (67 - 102)  BP: 111/60 (12-05-19 @ 02:24) (111/60 - 133/72)  RR: 18 (12-05-19 @ 02:24) (17 - 18)  SpO2: 99% (12-05-19 @ 02:24) (93% - 100%)    Medical Decision Making/Assessment/Plan:  64-year-old Female with a past medical history of DM, ESRD, CHF, coronary artery disease, and COPD, admitted for pneumonia, now with hyperglycemia.     - Diagnosis: Hyperglycemia in Type 1 DM   Per Endocrinology note patient is a highly brittle diabetic with high insulin sensitivity. Had previous episodes of hypoglycemia after receiving insulin. As patient has been receiving Lantus and Humalog throughout day, DKA should be less likely. Will obtain stat labs and treat hyperglycemia very cautiously to prevent a hypoglycemic episode.     1) VBG with electrolytes sent immediately to confirm glucose reading. Will repeat CBC, BMP, Magnesium, VBG, and possibly lactate and beta hydroxy depending on results.    2) Provide previously ordered 0200 correction dosing of 3 units. Based on above labs and repeat glucose level, will repeat correction dose.   3) Start Q2 hour blood glucose checks until returns to a more appropriate range.   4) Discussed snacks and sugar packets at bedside with patient. Education performed. Patient agreed to drink water instead of soda tonight and refrain from any more snacks.  5) Discussed with Pharmacy, will switch base solution of Zosyn and Cefepime from D5 to normal saline.    4) Will discuss above with on-call Endocrinology physician and follow with their recommendations.   5) Will endorse the above diagnostics and findings to the day medicine team for review and follow up. Will additionally sign out to day medicine teams to follow with relevant specialties.     Guicho Gant NP  Department of Medicine   #[id number]    Addendum 0325: Glucose of 703. Calculated anion gap of 22. Mild hyperkalemia of 5.7 in context of ESRD. Beta hydroxy added on. Due to the elevated glucose and anion gap in a brittle diabetic, will call Endocrine overnight to develop a treatment plan.    Addendum 0430: Glucose down to 594 post Humalog. Case reviewed with Endocrinology fellow Dr. Alexandra who recommended repeating the 3 units Humalog now, then following with am labs to monitor for anion gap closure. Beta hydroxy returned at 2. No acidosis seen on VBG. Will repeat glucose around 0600 and follow with am labs. Chart/Event Note  [facility] [address]DSU [address]  NATHAN MEEKS, 64y, Female  [id number]    Reason for Notification:   Notified by RN that patient's blood glucose reading was "high."    Events/History of Present Illness:  Patient's chart reviewed. Patient has a known history of brittle Type 1 DM, had a previous episode of hyper, followed by hypoglycemia post insulin correction on 12/4. Due to this episode of hypoglycemia patient's insulin was adjusted by Endocrine teams. Patient is being followed by Endocrinology Dr. Christian who ordered a 0200 blood glucose reading with sliding scale Humalog correction. RN performed test and reading returned "high," indicating a blood glucose above 600, twice. Previously this evening, patient received QHS correction insulin of Humalog 4 units, as well as 10 units of Lantus (~2100 12/4).   RN notes that patient has been non-adherent with prescribed diet and has been snacking throughout night. Beto crackers and sugar packets were noted at bedside during assessment. Additionally, patient has been receiving Zosyn and Cefepime infusions in D5.   Went to bedside with nursing staff to evaluate patient. Patient is awake and pacing in room during assessment. Patient A+Ox4, upset appearing, but in no acute distress. Patient states that she is very upset about her high blood sugar reading, and additionally is complaining of chronic left foot pain, but otherwise denies any new complaints. Patient denies headache, dizziness, weakness, pre-syncope, vision changes, chest pain, palpitations, shortness of breath, abdominal pain, nausea, vomiting, constipation, diarrhea, urinary symptoms, frequency, or excessive thirst/hunger.     T(C): 36.8 (12-05-19 @ 02:24), Max: 36.8 (12-04-19 @ 07:00)  HR: 102 (12-05-19 @ 02:24) (67 - 102)  BP: 111/60 (12-05-19 @ 02:24) (111/60 - 133/72)  RR: 18 (12-05-19 @ 02:24) (17 - 18)  SpO2: 99% (12-05-19 @ 02:24) (93% - 100%)    Medical Decision Making/Assessment/Plan:  64-year-old Female with a past medical history of DM, ESRD, CHF, coronary artery disease, and COPD, admitted for pneumonia, now with hyperglycemia.     - Diagnosis: Hyperglycemia in Type 1 DM   Per Endocrinology note patient is a highly brittle diabetic with high insulin sensitivity. Had previous episodes of hypoglycemia after receiving insulin. As patient has been receiving Lantus and Humalog throughout day, DKA should be less likely. Will obtain stat labs and treat hyperglycemia very cautiously to prevent a hypoglycemic episode.     1) VBG with electrolytes sent immediately to confirm glucose reading. Will repeat CBC, BMP, Magnesium, VBG, and possibly lactate and beta hydroxy depending on results.    2) Provide previously ordered 0200 correction dosing of 3 units. Based on above labs and repeat glucose level, will repeat correction dose.   3) Start Q2 hour blood glucose checks until returns to a more appropriate range.   4) Discussed snacks and sugar packets at bedside with patient. Education performed. Patient agreed to drink water instead of soda tonight and refrain from any more snacks.  5) Discussed with Pharmacy, will switch base solution of Zosyn and Cefepime from D5 to normal saline.    4) Will discuss above with on-call Endocrinology physician and follow with their recommendations.   5) Will endorse the above diagnostics and findings to the day medicine team for review and follow up. Will additionally sign out to day medicine teams to follow with relevant specialties.     Guicho Gant NP  Department of Medicine   #[id number]    Addendum 0325: Glucose of 703. Calculated anion gap of 22. Mild hyperkalemia of 5.7 in context of ESRD. Beta hydroxy added on. Due to the elevated glucose and anion gap in a brittle diabetic, will call Endocrine overnight to develop a treatment plan.    Addendum 0430: Glucose down to 594 post Humalog. Case reviewed with Endocrinology fellow Dr. Alexandra who recommended repeating the 3 units Humalog now, then following with am labs to monitor for anion gap closure. Beta hydroxy returned at 2. No acidosis seen on VBG. Will repeat glucose around 0600 and follow with am labs. Chart/Event Note  [facility] 3DSU 351 W1  NATHAN MEEKS, 64y, Female  [id number]    Reason for Notification:   Notified by RN that patient's blood glucose reading was "high."    Events/History of Present Illness:  Patient's chart reviewed. Patient has a known history of brittle Type 1 DM, had a previous episode of hyper, followed by hypoglycemia post insulin correction on 12/4. Due to this episode of hypoglycemia patient's insulin was adjusted by Endocrine teams. Patient is being followed by Endocrinology Dr. Christian who ordered a 0200 blood glucose reading with sliding scale Humalog correction. RN performed test and reading returned "high," indicating a blood glucose above 600, twice. Previously this evening, patient received QHS correction insulin of Humalog 4 units, as well as 10 units of Lantus (~2100 12/4).   RN notes that patient has been non-adherent with prescribed diet and has been snacking throughout night. Beto crackers and sugar packets were noted at bedside during assessment. Additionally, patient has been receiving Zosyn and Cefepime infusions in D5.   Went to bedside with nursing staff to evaluate patient. Patient is awake and pacing in room during assessment. Patient A+Ox4, upset appearing, but in no acute distress. Patient states that she is very upset about her high blood sugar reading, and additionally is complaining of chronic left foot pain, but otherwise denies any new complaints. Patient denies headache, dizziness, weakness, pre-syncope, vision changes, chest pain, palpitations, shortness of breath, abdominal pain, nausea, vomiting, constipation, diarrhea, urinary symptoms, frequency, or excessive thirst/hunger.     T(C): 36.8 (12-05-19 @ 02:24), Max: 36.8 (12-04-19 @ 07:00)  HR: 102 (12-05-19 @ 02:24) (67 - 102)  BP: 111/60 (12-05-19 @ 02:24) (111/60 - 133/72)  RR: 18 (12-05-19 @ 02:24) (17 - 18)  SpO2: 99% (12-05-19 @ 02:24) (93% - 100%)    Medical Decision Making/Assessment/Plan:  64-year-old Female with a past medical history of DM, ESRD, CHF, coronary artery disease, and COPD, admitted for pneumonia, now with hyperglycemia.     - Diagnosis: Hyperglycemia in Type 1 DM   Per Endocrinology note patient is a highly brittle diabetic with high insulin sensitivity. Had previous episodes of hypoglycemia after receiving insulin. As patient has been receiving Lantus and Humalog throughout day, DKA should be less likely. Will obtain stat labs and treat hyperglycemia very cautiously to prevent a hypoglycemic episode.     1) VBG with electrolytes sent immediately to confirm glucose reading. Will repeat CBC, BMP, Magnesium, VBG, and possibly lactate and beta hydroxy depending on results.    2) Provide previously ordered 0200 correction dosing of 3 units. Based on above labs and repeat glucose level, will repeat correction dose.   3) Start Q2 hour blood glucose checks until returns to a more appropriate range.   4) Discussed snacks and sugar packets at bedside with patient. Education performed. Patient agreed to drink water instead of soda tonight and refrain from any more snacks.  5) Discussed with Pharmacy, will switch base solution of Zosyn and Cefepime from D5 to normal saline.    6) Will discuss above with on-call Endocrinology physician and follow with their recommendations.   7) Will endorse the above diagnostics and findings to the day medicine team for review and follow up. Will additionally sign out to day medicine teams to follow with relevant specialties.     Guicho Gant NP  Department of Medicine   #[id number]    Addendum 0325: Glucose of 703. Calculated anion gap of 22. Mild hyperkalemia of 5.7 in context of ESRD. Beta hydroxy added on. Due to the elevated glucose and anion gap in a brittle diabetic, will call Endocrine overnight to develop a treatment plan.    Guicho Gant NP     Addendum 0430: Glucose down to 594 post Humalog. Case reviewed with Endocrinology fellow Dr. Alexandra who recommended repeating the 3 units Humalog now, then following with am labs to monitor for anion gap closure. Beta hydroxy returned at 2. No acidosis seen on VBG. Will repeat glucose around 0600 and follow with am labs.    Guicho Gant NP Chart/Event Note  [facility] 3DSU 351 W1  NATHAN MEEKS, 64y, Female  [id number]    Reason for Notification:   Notified by RN that patient's blood glucose reading was "high."    Events/History of Present Illness:  Patient's chart reviewed. Patient has a known history of brittle Type 1 DM, had a previous episode of hyper, followed by hypoglycemia post insulin correction on 12/4. Due to this episode of hypoglycemia patient's insulin was adjusted by Endocrine teams. Patient is being followed by Endocrinology Dr. Christian who ordered a 0200 blood glucose reading with sliding scale Humalog correction. RN performed test and reading returned "high," indicating a blood glucose above 600, twice. Previously this evening, patient received QHS correction insulin of Humalog 4 units, as well as 10 units of Lantus (~2100 12/4).   RN notes that patient has been non-adherent with prescribed diet and has been snacking throughout night. Beto crackers and sugar packets were noted at bedside during assessment. Additionally, patient has been receiving Zosyn and Cefepime infusions in D5.   Went to bedside with nursing staff to evaluate patient. Patient is awake and pacing in room during assessment. Patient A+Ox4, upset appearing, but in no acute distress. Patient states that she is very upset about her high blood sugar reading, and additionally is complaining of chronic left foot pain, but otherwise denies any new complaints. Patient denies headache, dizziness, weakness, pre-syncope, vision changes, chest pain, palpitations, shortness of breath, abdominal pain, nausea, vomiting, constipation, diarrhea, urinary symptoms, frequency, or excessive thirst/hunger.     T(C): 36.8 (12-05-19 @ 02:24), Max: 36.8 (12-04-19 @ 07:00)  HR: 102 (12-05-19 @ 02:24) (67 - 102)  BP: 111/60 (12-05-19 @ 02:24) (111/60 - 133/72)  RR: 18 (12-05-19 @ 02:24) (17 - 18)  SpO2: 99% (12-05-19 @ 02:24) (93% - 100%)    Medical Decision Making/Assessment/Plan:  64-year-old Female with a past medical history of DM, ESRD, CHF, coronary artery disease, and COPD, admitted for pneumonia, now with hyperglycemia.     - Diagnosis: Hyperglycemia in Type 1 DM   Per Endocrinology note patient is a highly brittle diabetic with high insulin sensitivity. Had previous episodes of hypoglycemia after receiving insulin. As patient has been receiving Lantus and Humalog throughout day, DKA should be less likely. Will obtain stat labs and treat hyperglycemia very cautiously to prevent a hypoglycemic episode.     1) VBG with electrolytes sent immediately to confirm glucose reading. Will repeat CBC, BMP, Magnesium, VBG, and possibly lactate and beta hydroxy depending on results.    2) Provide previously ordered 0200 correction dosing of 3 units. Based on above labs and repeat glucose level, will repeat correction dose.   3) Start Q2 hour blood glucose checks until returns to a more appropriate range.   4) Discussed snacks and sugar packets at bedside with patient. Education performed. Patient agreed to drink water instead of soda tonight and refrain from any more snacks.  5) Discussed with Pharmacy, will switch base solution of Zosyn and Cefepime from D5 to normal saline.    6) Will discuss above with on-call Endocrinology physician and follow with their recommendations.   7) Will endorse the above diagnostics and findings to the day medicine team for review and follow up. Will additionally sign out to day medicine teams to follow with relevant specialties.     Guicho Gant NP  Department of Medicine   #[id number]    Addendum 0325: Glucose of 703. Calculated anion gap of 22. Mild hyperkalemia of 5.7 in context of ESRD. Beta hydroxy added on. Due to the elevated glucose and anion gap in a brittle diabetic, will call Endocrine overnight to develop a treatment plan.    Guicho Gant NP     Addendum 0430: Glucose down to 594 post Humalog. Case reviewed with Endocrinology fellow Dr. Alexandra who recommended repeating the 3 units Humalog now, then following with am labs to monitor for anion gap closure. Beta hydroxy returned at 2. No acidosis seen on VBG. Will repeat glucose around 0600 and follow with am labs.    Guicho Gant NP    Addendum 0640: Repeat labs pending. Glucose down to 547, lactate up to 2.6. Correct Sodium at 134. Pending CMP for anion gap and Beta Hydroxy. Patient to receive pre-meal and correction insulin this morning. Patient's vitals remain stable, still without new complaints or change in physical exam/ROS. Called and discussed above with internal medicine attending Dr. Viera who requested a MICU consult. Called and discussed case with MICU resident who requested a call back when remainder of am labs result. Will endorse to day medicine team to follow with labs and consult.     Guicho Gant NP

## 2019-12-05 NOTE — PROVIDER CONTACT NOTE (OTHER) - ASSESSMENT
Patient is asymptomatic. Patient is Alert and Oriented times Four. Patient denies chest pain, discomfort, palpitations, shortness of breath, dizziness and lightheadedness. No signs and symptoms of hyperglycemia noted. Patient denies feeling hot and dry. Patient's Heart Rhythm is Normal Sinus Rhythm on the cardiac monitor. NPO maintained as ordered.

## 2019-12-05 NOTE — PROVIDER CONTACT NOTE (OTHER) - ASSESSMENT
Patient is asymptomatic. Patient is Alert and Oriented times Four. Patient denies chest pain, discomfort, palpitations, shortness of breath, dizziness and lightheadedness. Patient's Heart Rhythm is Normal Sinus Rhythm on the cardiac monitor. Patient's Vital Signs: Temperature 98.1 F Oral, Heart Rate 77, Blood Pressure 146/80, Respiratory Rate 18 and O2 Saturation 100% on O2 2 Liters Nasal Cannula.

## 2019-12-06 LAB
ANION GAP SERPL CALC-SCNC: 15 MMOL/L — SIGNIFICANT CHANGE UP (ref 5–17)
BLD GP AB SCN SERPL QL: NEGATIVE — SIGNIFICANT CHANGE UP
BUN SERPL-MCNC: 29 MG/DL — HIGH (ref 7–23)
CALCIUM SERPL-MCNC: 8.6 MG/DL — SIGNIFICANT CHANGE UP (ref 8.4–10.5)
CHLORIDE SERPL-SCNC: 92 MMOL/L — LOW (ref 96–108)
CO2 SERPL-SCNC: 27 MMOL/L — SIGNIFICANT CHANGE UP (ref 22–31)
CREAT SERPL-MCNC: 5.02 MG/DL — HIGH (ref 0.5–1.3)
GLUCOSE BLDC GLUCOMTR-MCNC: 117 MG/DL — HIGH (ref 70–99)
GLUCOSE BLDC GLUCOMTR-MCNC: 182 MG/DL — HIGH (ref 70–99)
GLUCOSE BLDC GLUCOMTR-MCNC: 235 MG/DL — HIGH (ref 70–99)
GLUCOSE BLDC GLUCOMTR-MCNC: 290 MG/DL — HIGH (ref 70–99)
GLUCOSE BLDC GLUCOMTR-MCNC: 340 MG/DL — HIGH (ref 70–99)
GLUCOSE SERPL-MCNC: 126 MG/DL — HIGH (ref 70–99)
HCT VFR BLD CALC: 22.9 % — LOW (ref 34.5–45)
HCT VFR BLD CALC: 22.9 % — LOW (ref 34.5–45)
HGB BLD-MCNC: 7.2 G/DL — LOW (ref 11.5–15.5)
HGB BLD-MCNC: 7.4 G/DL — LOW (ref 11.5–15.5)
MCHC RBC-ENTMCNC: 31.4 GM/DL — LOW (ref 32–36)
MCHC RBC-ENTMCNC: 32.3 GM/DL — SIGNIFICANT CHANGE UP (ref 32–36)
MCHC RBC-ENTMCNC: 32.6 PG — SIGNIFICANT CHANGE UP (ref 27–34)
MCHC RBC-ENTMCNC: 32.9 PG — SIGNIFICANT CHANGE UP (ref 27–34)
MCV RBC AUTO: 101.8 FL — HIGH (ref 80–100)
MCV RBC AUTO: 103.6 FL — HIGH (ref 80–100)
NRBC # BLD: 0 /100 WBCS — SIGNIFICANT CHANGE UP (ref 0–0)
NRBC # BLD: 0 /100 WBCS — SIGNIFICANT CHANGE UP (ref 0–0)
PLATELET # BLD AUTO: 173 K/UL — SIGNIFICANT CHANGE UP (ref 150–400)
PLATELET # BLD AUTO: 173 K/UL — SIGNIFICANT CHANGE UP (ref 150–400)
POTASSIUM SERPL-MCNC: 4.6 MMOL/L — SIGNIFICANT CHANGE UP (ref 3.5–5.3)
POTASSIUM SERPL-SCNC: 4.6 MMOL/L — SIGNIFICANT CHANGE UP (ref 3.5–5.3)
RBC # BLD: 2.21 M/UL — LOW (ref 3.8–5.2)
RBC # BLD: 2.25 M/UL — LOW (ref 3.8–5.2)
RBC # FLD: 13.8 % — SIGNIFICANT CHANGE UP (ref 10.3–14.5)
RBC # FLD: 13.9 % — SIGNIFICANT CHANGE UP (ref 10.3–14.5)
RH IG SCN BLD-IMP: POSITIVE — SIGNIFICANT CHANGE UP
SODIUM SERPL-SCNC: 134 MMOL/L — LOW (ref 135–145)
WBC # BLD: 3.57 K/UL — LOW (ref 3.8–10.5)
WBC # BLD: 3.87 K/UL — SIGNIFICANT CHANGE UP (ref 3.8–10.5)
WBC # FLD AUTO: 3.57 K/UL — LOW (ref 3.8–10.5)
WBC # FLD AUTO: 3.87 K/UL — SIGNIFICANT CHANGE UP (ref 3.8–10.5)

## 2019-12-06 PROCEDURE — 99232 SBSQ HOSP IP/OBS MODERATE 35: CPT

## 2019-12-06 RX ORDER — ERYTHROPOIETIN 10000 [IU]/ML
20000 INJECTION, SOLUTION INTRAVENOUS; SUBCUTANEOUS ONCE
Refills: 0 | Status: COMPLETED | OUTPATIENT
Start: 2019-12-06 | End: 2019-12-06

## 2019-12-06 RX ORDER — INSULIN LISPRO 100/ML
2 VIAL (ML) SUBCUTANEOUS AT BEDTIME
Refills: 0 | Status: DISCONTINUED | OUTPATIENT
Start: 2019-12-06 | End: 2019-12-09

## 2019-12-06 RX ORDER — INSULIN LISPRO 100/ML
VIAL (ML) SUBCUTANEOUS
Refills: 0 | Status: DISCONTINUED | OUTPATIENT
Start: 2019-12-06 | End: 2019-12-09

## 2019-12-06 RX ADMIN — OXYCODONE AND ACETAMINOPHEN 1 TABLET(S): 5; 325 TABLET ORAL at 04:24

## 2019-12-06 RX ADMIN — Medication 81 MILLIGRAM(S): at 13:43

## 2019-12-06 RX ADMIN — Medication 4 UNIT(S): at 07:59

## 2019-12-06 RX ADMIN — Medication 650 MILLIGRAM(S): at 03:05

## 2019-12-06 RX ADMIN — ATORVASTATIN CALCIUM 20 MILLIGRAM(S): 80 TABLET, FILM COATED ORAL at 21:55

## 2019-12-06 RX ADMIN — Medication 0.5 MILLIGRAM(S): at 05:21

## 2019-12-06 RX ADMIN — SEVELAMER CARBONATE 800 MILLIGRAM(S): 2400 POWDER, FOR SUSPENSION ORAL at 13:44

## 2019-12-06 RX ADMIN — OXYCODONE AND ACETAMINOPHEN 1 TABLET(S): 5; 325 TABLET ORAL at 21:57

## 2019-12-06 RX ADMIN — CLOPIDOGREL BISULFATE 75 MILLIGRAM(S): 75 TABLET, FILM COATED ORAL at 13:43

## 2019-12-06 RX ADMIN — Medication 650 MILLIGRAM(S): at 02:35

## 2019-12-06 RX ADMIN — Medication 2: at 21:56

## 2019-12-06 RX ADMIN — PANTOPRAZOLE SODIUM 40 MILLIGRAM(S): 20 TABLET, DELAYED RELEASE ORAL at 05:21

## 2019-12-06 RX ADMIN — OXYCODONE AND ACETAMINOPHEN 1 TABLET(S): 5; 325 TABLET ORAL at 12:41

## 2019-12-06 RX ADMIN — Medication 3 MILLILITER(S): at 13:42

## 2019-12-06 RX ADMIN — Medication 2 UNIT(S): at 21:57

## 2019-12-06 RX ADMIN — CEFEPIME 100 MILLIGRAM(S): 1 INJECTION, POWDER, FOR SOLUTION INTRAMUSCULAR; INTRAVENOUS at 13:58

## 2019-12-06 RX ADMIN — Medication 100 MILLIGRAM(S): at 18:23

## 2019-12-06 RX ADMIN — Medication 2: at 18:22

## 2019-12-06 RX ADMIN — Medication 4 UNIT(S): at 13:41

## 2019-12-06 RX ADMIN — Medication 4 UNIT(S): at 18:22

## 2019-12-06 RX ADMIN — Medication 3 MILLILITER(S): at 18:23

## 2019-12-06 RX ADMIN — Medication 0.5 MILLIGRAM(S): at 18:23

## 2019-12-06 RX ADMIN — OXYCODONE AND ACETAMINOPHEN 1 TABLET(S): 5; 325 TABLET ORAL at 13:46

## 2019-12-06 RX ADMIN — ERYTHROPOIETIN 20000 UNIT(S): 10000 INJECTION, SOLUTION INTRAVENOUS; SUBCUTANEOUS at 11:56

## 2019-12-06 RX ADMIN — OXYCODONE AND ACETAMINOPHEN 1 TABLET(S): 5; 325 TABLET ORAL at 04:54

## 2019-12-06 RX ADMIN — SEVELAMER CARBONATE 800 MILLIGRAM(S): 2400 POWDER, FOR SUSPENSION ORAL at 07:59

## 2019-12-06 RX ADMIN — Medication 1: at 13:41

## 2019-12-06 RX ADMIN — Medication 3 MILLILITER(S): at 05:21

## 2019-12-06 RX ADMIN — OXYCODONE AND ACETAMINOPHEN 1 TABLET(S): 5; 325 TABLET ORAL at 22:40

## 2019-12-06 RX ADMIN — INSULIN GLARGINE 11 UNIT(S): 100 INJECTION, SOLUTION SUBCUTANEOUS at 21:56

## 2019-12-06 RX ADMIN — SEVELAMER CARBONATE 800 MILLIGRAM(S): 2400 POWDER, FOR SUSPENSION ORAL at 18:23

## 2019-12-06 NOTE — PROGRESS NOTE ADULT - SUBJECTIVE AND OBJECTIVE BOX
CC: f/u for right sided PNA    Patient reports nothing    REVIEW OF SYSTEMS:  All other review of systems negative (Comprehensive ROS)    Antimicrobials Day #  : 6/7  cefepime   IVPB 1000 milliGRAM(s) IV Intermittent daily    Other Medications Reviewed    T(F): 97.8 (12-06-19 @ 14:35), Max: 98.7 (12-06-19 @ 09:00)  HR: 90 (12-06-19 @ 14:35)  BP: 120/66 (12-06-19 @ 14:35)  RR: 18 (12-06-19 @ 14:35)  SpO2: 98% (12-06-19 @ 14:35)  Wt(kg): --    PHYSICAL EXAM:  General: alert, no acute distress  Eyes:  anicteric, no conjunctival injection, no discharge  Oropharynx: no lesions or injection 	  Neck: supple, without adenopathy  Lungs: bibasilar rales  to auscultation  Heart: regular rate and rhythm; 2/6 sys m  Abdomen: soft, nondistended, nontender.  Skin: no lesions  Extremities: left leg swollen, no tenderness, no redness, no open wound. right leg no  edema  Neurologic: alert, oriented, moves all extremities      LAB RESULTS:                        7.4    3.87  )-----------( 173      ( 06 Dec 2019 11:19 )             22.9     12-06    134<L>  |  92<L>  |  29<H>  ----------------------------<  126<H>  4.6   |  27  |  5.02<H>    Ca    8.6      06 Dec 2019 09:41  Mg     2.1     12-05    TPro  7.0  /  Alb  4.0  /  TBili  0.3  /  DBili  x   /  AST  15  /  ALT  19  /  AlkPhos  118  12-05    LIVER FUNCTIONS - ( 05 Dec 2019 09:06 )  Alb: 4.0 g/dL / Pro: 7.0 g/dL / ALK PHOS: 118 U/L / ALT: 19 U/L / AST: 15 U/L / GGT: x             MICROBIOLOGY:  RECENT CULTURES:        RADIOLOGY REVIEWED:

## 2019-12-06 NOTE — PROGRESS NOTE ADULT - SUBJECTIVE AND OBJECTIVE BOX
Diabetes Follow up note:    Chief complaint: f/u T1DM w/hyperglycemia    Interval Hx: Glucose values much improved over the past 24 hours. Pt unable to recall if she had a snack last night. Had HD this morning, "I'm not sure if I'm hungry today". On IV abx for Pneumonia.     Review of Systems:  General: denies pain   GI: Tolerating POs. Denies N/V/D/Abd pain  CV: Denies CP/SOB  ENDO: No S&Sx of hypoglycemia  MEDS:  atorvastatin 20 milliGRAM(s) Oral at bedtime  insulin glargine Injectable (LANTUS) 11 Unit(s) SubCutaneous at bedtime  insulin lispro (HumaLOG) corrective regimen sliding scale   SubCutaneous <User Schedule>  insulin lispro (HumaLOG) corrective regimen sliding scale   SubCutaneous three times a day before meals  insulin lispro (HumaLOG) corrective regimen sliding scale   SubCutaneous at bedtime  insulin lispro Injectable (HumaLOG) 3 Unit(s) SubCutaneous at bedtime PRN  insulin lispro Injectable (HumaLOG) 4 Unit(s) SubCutaneous three times a day before meals    cefepime   IVPB 1000 milliGRAM(s) IV Intermittent daily    Allergies    No Known Allergies          PE:  General: Female sitting in chair. NAD.   Vital Signs Last 24 Hrs  T(C): 36.6 (06 Dec 2019 12:45), Max: 37.1 (06 Dec 2019 09:00)  T(F): 97.9 (06 Dec 2019 12:45), Max: 98.7 (06 Dec 2019 09:00)  HR: 83 (06 Dec 2019 12:45) (75 - 86)  BP: 125/63 (06 Dec 2019 12:45) (102/62 - 138/78)  BP(mean): --  RR: 17 (06 Dec 2019 12:45) (16 - 18)  SpO2: 98% (06 Dec 2019 12:45) (97% - 100%)  Resp: CTA b/l. no wheeze.   Abd: Soft, NT,ND,   Extremities: Warm. + LE edema R>L  Neuro: A&O X3    LABS:  POCT Blood Glucose.: 182 mg/dL (12-06-19 @ 13:29)  POCT Blood Glucose.: 117 mg/dL (12-06-19 @ 07:55)  POCT Blood Glucose.: 235 mg/dL (12-06-19 @ 02:22)  POCT Blood Glucose.: 131 mg/dL (12-05-19 @ 22:09)  POCT Blood Glucose.: 87 mg/dL (12-05-19 @ 17:21)  POCT Blood Glucose.: 182 mg/dL (12-05-19 @ 14:39)  POCT Blood Glucose.: 262 mg/dL (12-05-19 @ 11:55)  POCT Blood Glucose.: 393 mg/dL (12-05-19 @ 09:44)  POCT Blood Glucose.: 405 mg/dL (12-05-19 @ 09:42)  POCT Blood Glucose.: 511 mg/dL (12-05-19 @ 08:44)  POCT Blood Glucose.: 492 mg/dL (12-05-19 @ 08:42)  POCT Blood Glucose.: 493 mg/dL (12-05-19 @ 07:44)  POCT Blood Glucose.: 539 mg/dL (12-05-19 @ 07:43)  POCT Blood Glucose.: 522 mg/dL (12-05-19 @ 05:45)  POCT Blood Glucose.: 594 mg/dL (12-05-19 @ 03:56)  POCT Blood Glucose.: >600 mg/dL (12-05-19 @ 03:55)  POCT Blood Glucose.: >600 mg/dL (12-05-19 @ 02:17)  POCT Blood Glucose.: >600 mg/dL (12-05-19 @ 02:16)  POCT Blood Glucose.: 439 mg/dL (12-04-19 @ 21:28)  POCT Blood Glucose.: 482 mg/dL (12-04-19 @ 21:27)  POCT Blood Glucose.: 338 mg/dL (12-04-19 @ 19:21)  POCT Blood Glucose.: 208 mg/dL (12-04-19 @ 17:16)  POCT Blood Glucose.: 185 mg/dL (12-04-19 @ 15:36)  POCT Blood Glucose.: 94 mg/dL (12-04-19 @ 14:42)  POCT Blood Glucose.: 81 mg/dL (12-04-19 @ 13:52)  POCT Blood Glucose.: 81 mg/dL (12-04-19 @ 13:49)  POCT Blood Glucose.: 180 mg/dL (12-04-19 @ 12:56)  POCT Blood Glucose.: 53 mg/dL (12-04-19 @ 12:32)  POCT Blood Glucose.: 105 mg/dL (12-04-19 @ 10:52)  POCT Blood Glucose.: 472 mg/dL (12-04-19 @ 08:58)  POCT Blood Glucose.: 515 mg/dL (12-04-19 @ 08:54)  POCT Blood Glucose.: 571 mg/dL (12-04-19 @ 08:25)  POCT Blood Glucose.: 533 mg/dL (12-04-19 @ 08:23)  POCT Blood Glucose.: 513 mg/dL (12-04-19 @ 06:52)  POCT Blood Glucose.: 593 mg/dL (12-04-19 @ 06:50)  POCT Blood Glucose.: 251 mg/dL (12-03-19 @ 21:07)  POCT Blood Glucose.: 140 mg/dL (12-03-19 @ 17:28)                            7.4    3.87  )-----------( 173      ( 06 Dec 2019 11:19 )             22.9       12-06    134<L>  |  92<L>  |  29<H>  ----------------------------<  126<H>  4.6   |  27  |  5.02<H>    Ca    8.6      06 Dec 2019 09:41  Mg     2.1     12-05    TPro  7.0  /  Alb  4.0  /  TBili  0.3  /  DBili  x   /  AST  15  /  ALT  19  /  AlkPhos  118  12-05      Thyroid Function Tests:  11-14 @ 09:15 TSH 1.78 FreeT4 -- T3 -- Anti TPO -- Anti Thyroglobulin Ab -- TSI --      Hemoglobin A1C, Whole Blood: 8.7 % <H> [4.0 - 5.6] (11-13-19 @ 23:44)  Hemoglobin A1C, Whole Blood: 8.1 % <H> [4.0 - 5.6] (09-16-19 @ 08:04)          Contact number: isabel [phone] or [phone]

## 2019-12-06 NOTE — PROGRESS NOTE ADULT - SUBJECTIVE AND OBJECTIVE BOX
PULMONARY PROGRESS NOTE    NATHAN MEEKS  MRN-[id number]    Patient is a 64y old  Female who presents with a chief complaint of shortness of breath (02 Dec 2019 07:20)      HPI:  feeling better overall, getting dialysis    ROS:   neg    MEDICATIONS  (STANDING):  albuterol/ipratropium for Nebulization 3 milliLiter(s) Nebulizer every 6 hours  aspirin enteric coated 81 milliGRAM(s) Oral daily  atorvastatin 20 milliGRAM(s) Oral at bedtime  buDESOnide    Inhalation Suspension 0.5 milliGRAM(s) Inhalation every 12 hours  cefepime   IVPB 1000 milliGRAM(s) IV Intermittent daily  clopidogrel Tablet 75 milliGRAM(s) Oral daily  dextrose 5%. 1000 milliLiter(s) (50 mL/Hr) IV Continuous <Continuous>  dextrose 50% Injectable 12.5 Gram(s) IV Push once  dextrose 50% Injectable 25 Gram(s) IV Push once  dextrose 50% Injectable 25 Gram(s) IV Push once  dextrose 50% Injectable 25 Gram(s) IV Push once  heparin  Injectable 5000 Unit(s) SubCutaneous every 12 hours  hydrALAZINE 50 milliGRAM(s) Oral daily  hydrALAZINE 100 milliGRAM(s) Oral <User Schedule>  insulin glargine Injectable (LANTUS) 11 Unit(s) SubCutaneous at bedtime  insulin lispro (HumaLOG) corrective regimen sliding scale   SubCutaneous <User Schedule>  insulin lispro (HumaLOG) corrective regimen sliding scale   SubCutaneous three times a day before meals  insulin lispro (HumaLOG) corrective regimen sliding scale   SubCutaneous at bedtime  insulin lispro Injectable (HumaLOG) 4 Unit(s) SubCutaneous three times a day before meals  lisinopril 40 milliGRAM(s) Oral daily  metoprolol succinate ER 50 milliGRAM(s) Oral daily  pantoprazole    Tablet 40 milliGRAM(s) Oral before breakfast  sevelamer carbonate 800 milliGRAM(s) Oral three times a day with meals    MEDICATIONS  (PRN):  acetaminophen   Tablet .. 650 milliGRAM(s) Oral every 6 hours PRN Mild Pain (1 - 3), Moderate Pain (4 - 6)  dextrose 40% Gel 15 Gram(s) Oral once PRN Blood Glucose LESS THAN 70 milliGRAM(s)/deciliter  glucagon  Injectable 1 milliGRAM(s) IntraMuscular once PRN Glucose LESS THAN 70 milligrams/deciliter  insulin lispro Injectable (HumaLOG) 3 Unit(s) SubCutaneous at bedtime PRN Give if patient has bedtime snack  oxycodone    5 mG/acetaminophen 325 mG 1 Tablet(s) Oral every 8 hours PRN Severe Pain (7 - 10)        EXAM:  Vital Signs Last 24 Hrs  T(C): 36.7 (05 Dec 2019 07:45), Max: 36.8 (05 Dec 2019 02:24)  T(F): 98.1 (05 Dec 2019 07:45), Max: 98.2 (05 Dec 2019 02:24)  HR: 88 (05 Dec 2019 07:45) (67 - 102)  BP: 126/70 (05 Dec 2019 07:45) (111/60 - 131/74)  BP(mean): --  RR: 18 (05 Dec 2019 07:45) (18 - 18)  SpO2: 99% (05 Dec 2019 07:45) (98% - 100%)  GENERAL: The patient is awake and alert in no apparent distress.     LUNGS: clear anteriorly        LABS/IMAGING: reviewed                                              7.4    3.87  )-----------( 173      ( 06 Dec 2019 11:19 )             22.9   12-06    134<L>  |  92<L>  |  29<H>  ----------------------------<  126<H>  4.6   |  27  |  5.02<H>    Ca    8.6      06 Dec 2019 09:41  Mg     2.1     12-05    TPro  7.0  /  Alb  4.0  /  TBili  0.3  /  DBili  x   /  AST  15  /  ALT  19  /  AlkPhos  118  12-05      < from: Xray Chest 2 Views PA/Lat (12.01.19 @ 17:35) >  IMPRESSION:   New right lower lobe opacity, likely pneumonia.    < end of copied text >  < from: NM Pulmonary Ventilation/Perfusion Scan (12.01.19 @ 22:58) >  IMPRESSION:     Very low probability of pulmonary embolus.    < end of copied text >    < from: CT Chest No Cont (12.03.19 @ 23:13) >  IMPRESSION:     1.  Bilateral lower lobe consolidations and volume loss may represent   areas of atelectasis, however cannot exclude superimposed infection.    2.  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 Recommend continued follow-ups   or treatment.    < end of copied text >      PROBLEM LIST:  64y Female with HEALTH ISSUES - PROBLEM Dx:  pneumonia  COPD  Diabetes mellitus type 1: Diabetes mellitus type 1  CAD (coronary artery disease): CAD (coronary artery disease)  ESRD (end stage renal disease): ESRD (end stage renal disease)  Chronic systolic congestive heart failure: Chronic systolic congestive heart failure  Thrombocytopenia: Thrombocytopenia  Lower extremity edema: Lower extremity edema    RECS:  -Known to have multiple nodule/lymphadenopathy, EBUS in 3/2019 showed reactive lymph nodes.  Will follow up as outpt to discuss needle biopsy although likely not a candidate given all comorbidities, it has been discussed with per multiple times.   - abx per ID  -pulmicort bid, duonebs  -appreciate cards eval  -renal fu      Alem Tate MD   [phone]

## 2019-12-06 NOTE — PROGRESS NOTE ADULT - ASSESSMENT
Patient with pvd, lle bypass with chronic swelling, copd, dm, as admitted for sob , found to have bibasilar pneumonia and dka  Plan:  1 more day of cefepime  dka, glycemic control per endocrine

## 2019-12-06 NOTE — PROGRESS NOTE ADULT - SUBJECTIVE AND OBJECTIVE BOX
Cardiovascular Disease Progress Note    Overnight events: No acute events overnight.  no new cardiac sx  Otherwise review of systems negative    Objective Findings:  T(C): 36.6 (19 @ 04:45), Max: 36.7 (19 @ 07:45)  HR: 78 (19 @ 04:45) (77 - 88)  BP: 102/62 (19 @ 04:45) (102/62 - 146/80)  RR: 18 (19 @ 04:45) (18 - 18)  SpO2: 100% (19 @ 04:45) (98% - 100%)  Wt(kg): --  Daily     Daily Weight in k.3 (06 Dec 2019 04:45)      Physical Exam:  Gen: NAD  HEENT: EOMI  CV: RRR, normal S1 + S2, no m/r/g  Lungs: CTAB  Abd: soft, non-tender  Ext: + edema    Telemetry: nsr    Laboratory Data:                        8.3    5.02  )-----------( 181      ( 05 Dec 2019 06:17 )             26.1     12    130<L>  |  90<L>  |  28<H>  ----------------------------<  547<HH>  5.3   |  23  |  4.35<H>    Ca    9.2      05 Dec 2019 09:06  Mg     2.1         TPro  7.0  /  Alb  4.0  /  TBili  0.3  /  DBili  x   /  AST  15  /  ALT  19  /  AlkPhos  118  12-              Inpatient Medications:  MEDICATIONS  (STANDING):  albuterol/ipratropium for Nebulization 3 milliLiter(s) Nebulizer every 6 hours  aspirin enteric coated 81 milliGRAM(s) Oral daily  atorvastatin 20 milliGRAM(s) Oral at bedtime  buDESOnide    Inhalation Suspension 0.5 milliGRAM(s) Inhalation every 12 hours  cefepime   IVPB 1000 milliGRAM(s) IV Intermittent daily  clopidogrel Tablet 75 milliGRAM(s) Oral daily  dextrose 5%. 1000 milliLiter(s) (50 mL/Hr) IV Continuous <Continuous>  dextrose 50% Injectable 12.5 Gram(s) IV Push once  dextrose 50% Injectable 25 Gram(s) IV Push once  dextrose 50% Injectable 25 Gram(s) IV Push once  dextrose 50% Injectable 25 Gram(s) IV Push once  heparin  Injectable 5000 Unit(s) SubCutaneous every 12 hours  hydrALAZINE 50 milliGRAM(s) Oral daily  hydrALAZINE 100 milliGRAM(s) Oral <User Schedule>  insulin glargine Injectable (LANTUS) 11 Unit(s) SubCutaneous at bedtime  insulin lispro (HumaLOG) corrective regimen sliding scale   SubCutaneous <User Schedule>  insulin lispro (HumaLOG) corrective regimen sliding scale   SubCutaneous three times a day before meals  insulin lispro (HumaLOG) corrective regimen sliding scale   SubCutaneous at bedtime  insulin lispro Injectable (HumaLOG) 4 Unit(s) SubCutaneous three times a day before meals  lisinopril 40 milliGRAM(s) Oral daily  metoprolol succinate ER 50 milliGRAM(s) Oral daily  pantoprazole    Tablet 40 milliGRAM(s) Oral before breakfast  sevelamer carbonate 800 milliGRAM(s) Oral three times a day with meals      Assessment:  -volume overload  -SOB  -right lower lobe opacity  -elevated but stable cardiac enzymes (hs trop) with recent admission with relatively preserved ck/ck mb fraction and no obvious ischemic changes on ekg  -hx of NSVT  -pAT  -ischemic cardiomyopathy, cad s/p multiple stents  -esrd on hd  -PAD s/p prior intervention       Recs:  -optimization of volume status and electrolyte abnormalities with hd.   -vq scan and venous duplex neg  -cli sx. known pad. s/p art duplex. vascular eval appreciated. pain control  -new rll opacity -->  pulm and ID recs appreciated. ct chest results noted  -known extensive CAD and ICM. s/p [facility] 2019 --> severe and diffuse instent restenosis along RCA --> unable to stent due to multiple layers of stenting and prior brachytherapy. denies any true ischemic sx at present  -c/w beta blockers for nsvt/pat/cad (as above). currently on toprol 50mg, hydral and lisinopril uptitrate GDMT as tolerates. wouldnt inc bb at this time 2/2 hx of bronchospasm  -hx of prolonged qtc. avoid qtc prolonging meds  -s/p [facility] 2019: mod-severe MR, pseudo AS (low flow-low gradient), mod-severe LV dysfunction --> recent CTS consult with dr hebert appreciated. plan is for medical management given surgical risk and patient preference  -c/w asa, plavix, statin for ischemic cardiomyopathy/CAD/PAD  -dvt ppx        Over 25 minutes spent on total encounter; more than 50% of the visit was spent counseling and/or coordinating care by the attending physician.      Julián Biswas MD   Cardiovascular Disease  [phone]

## 2019-12-06 NOTE — PROGRESS NOTE ADULT - ASSESSMENT
63 yo woman with CAD (Cath Feb '19 showing 99% RCA, 100% RPLS, 100% Cx) s/p multiple stents/brachytherapy, ESRD (on HD M/T/T/Sa), DM1 c/b neuropathy and retinopathy, CHF (EF 30% per last [facility]), mod MR, severe AS, RHF, TIA, severe PVD s/p b/l fempop bypass c/b left thrombosed graft, left subclavian vein stenosis s/p stent, COPD not on O2, gout, hilar lymphadenopathy of unknown etiology, frequent hospitalizations (usually 1-3 times a month) presented with  SOB of 1 day. had cxr revealing pneumonia.     1- ESRD  2- HTN   3- SHPT  4- Pneumonia    hd 3.5 hr 2.5 liter 2 k bfr 400 dfr 600  F 180   anemia type and screen will need prbc with am hd   epogen 71040 U ivp   see hd flow shee

## 2019-12-06 NOTE — PROGRESS NOTE ADULT - ASSESSMENT
· Assessment		  63 yo woman with PMH of CAD, DM1 c/b neuropathy and retinopathy, CHF (EF 30%), mod MR, severe AS, RHF, TIA, severe PVD s/p b/l fempop bypass c/b left thrombosed graft, left subclavian vein stenosis s/p stent, COPD not on O2, gout, hilar lymphadenopathy of unknown etiology, frequent hospitalizations (usually 1-3 times a month) p/w SOB, cough, LLE pain found with possible RLL PNA and LLE cellulitis    Shortness of breath.   - Right lower lung opacity: / PNA.  - continue antibiotics. ID follow   - Pulmonary follow noted  -Check ambulatory saturation off supplemental O2.     Anemia  - transfuse  - Guaiac    Lower extremity edema.   - LE doppler negative for DVT  - Reference: # [id number] Last prescribed Percocet 5/325 in October 2019  - Percocet prn for pain O/N.   - Vascular evaluation noted    Thrombocytopenia.   - Thrombocytopenia on labs. Unclear if spurious result or true.  - Follow CBC with T&S     Chronic systolic congestive heart failure.  - Appears euvolemic.  - Continue with Lisinopril.   - Cardiology follow.    ESRD (end stage renal disease).   - Renal follow Dr. Schmidt  - HD    CAD (coronary artery disease).   -  Troponin elevated likely in setting of ESRD. Similar level to prior labs  - EKG unchanged  - Continue Hydralazine  - Per patient no longer takes Isosorbide Dinitrate   - Cardiology follow.    Diabetes mellitus type 1.  - Compliant with medications  - Continue with Humalog 3U TID premeal  - Continue with corrective SSI  - Continue with Lantus (will start with 8U tonight) as patient with latest glucose of 134.   - Endocrine follow.    Prophylactic measure.  -  DVT ppx: Hold Pharmacologic DVT in setting of new thrombocytopenia. SCDs    Fall risk protocol.   Pierce Viera MD pager [phone]

## 2019-12-06 NOTE — PROGRESS NOTE ADULT - SUBJECTIVE AND OBJECTIVE BOX
Patient is a 64y old  Female who presents with a chief complaint of shortness of breath (06 Dec 2019 20:21)      SUBJECTIVE / OVERNIGHT EVENTS: Comfortable without new complaints.   Review of Systems  chest pain no  palpitations no  sob no  nausea no  headache no    MEDICATIONS  (STANDING):  albuterol/ipratropium for Nebulization 3 milliLiter(s) Nebulizer every 6 hours  aspirin enteric coated 81 milliGRAM(s) Oral daily  atorvastatin 20 milliGRAM(s) Oral at bedtime  buDESOnide    Inhalation Suspension 0.5 milliGRAM(s) Inhalation every 12 hours  cefepime   IVPB 1000 milliGRAM(s) IV Intermittent daily  clopidogrel Tablet 75 milliGRAM(s) Oral daily  dextrose 5%. 1000 milliLiter(s) (50 mL/Hr) IV Continuous <Continuous>  dextrose 50% Injectable 12.5 Gram(s) IV Push once  dextrose 50% Injectable 25 Gram(s) IV Push once  dextrose 50% Injectable 25 Gram(s) IV Push once  heparin  Injectable 5000 Unit(s) SubCutaneous every 12 hours  hydrALAZINE 50 milliGRAM(s) Oral daily  hydrALAZINE 100 milliGRAM(s) Oral <User Schedule>  insulin glargine Injectable (LANTUS) 11 Unit(s) SubCutaneous at bedtime  insulin lispro (HumaLOG) corrective regimen sliding scale   SubCutaneous <User Schedule>  insulin lispro (HumaLOG) corrective regimen sliding scale   SubCutaneous three times a day before meals  insulin lispro (HumaLOG) corrective regimen sliding scale   SubCutaneous at bedtime  insulin lispro Injectable (HumaLOG) 2 Unit(s) SubCutaneous at bedtime  insulin lispro Injectable (HumaLOG) 4 Unit(s) SubCutaneous three times a day before meals  lisinopril 40 milliGRAM(s) Oral daily  metoprolol succinate ER 50 milliGRAM(s) Oral daily  pantoprazole    Tablet 40 milliGRAM(s) Oral before breakfast  sevelamer carbonate 800 milliGRAM(s) Oral three times a day with meals    MEDICATIONS  (PRN):  acetaminophen   Tablet .. 650 milliGRAM(s) Oral every 6 hours PRN Mild Pain (1 - 3), Moderate Pain (4 - 6)  dextrose 40% Gel 15 Gram(s) Oral once PRN Blood Glucose LESS THAN 70 milliGRAM(s)/deciliter  glucagon  Injectable 1 milliGRAM(s) IntraMuscular once PRN Glucose LESS THAN 70 milligrams/deciliter  oxycodone    5 mG/acetaminophen 325 mG 1 Tablet(s) Oral every 8 hours PRN Severe Pain (7 - 10)      Vital Signs Last 24 Hrs  T(C): 36.6 (06 Dec 2019 14:35), Max: 37.1 (06 Dec 2019 09:00)  T(F): 97.8 (06 Dec 2019 14:35), Max: 98.7 (06 Dec 2019 09:00)  HR: 90 (06 Dec 2019 14:35) (75 - 90)  BP: 120/66 (06 Dec 2019 14:35) (102/62 - 125/63)  BP(mean): --  RR: 18 (06 Dec 2019 14:35) (16 - 18)  SpO2: 98% (06 Dec 2019 14:35) (97% - 100%)    PHYSICAL EXAM:  GENERAL: NAD, well-developed  HEAD:  Atraumatic, Normocephalic  EYES: EOMI, PERRLA, conjunctiva and sclera clear  NECK: Supple, No JVD  CHEST/LUNG: Clear to auscultation bilaterally; No wheeze  HEART: Regular rate and rhythm; No murmurs, rubs, or gallops  ABDOMEN: Soft, Nontender, Nondistended; Bowel sounds present  EXTREMITIES:  2+ L leg edema  PSYCH: AAOx3  NEUROLOGY: non-focal  SKIN: No rashes or lesions    LABS:                        7.4    3.87  )-----------( 173      ( 06 Dec 2019 11:19 )             22.9     12-06    134<L>  |  92<L>  |  29<H>  ----------------------------<  126<H>  4.6   |  27  |  5.02<H>    Ca    8.6      06 Dec 2019 09:41  Mg     2.1     12-05    TPro  7.0  /  Alb  4.0  /  TBili  0.3  /  DBili  x   /  AST  15  /  ALT  19  /  AlkPhos  118  12-05                RADIOLOGY & ADDITIONAL TESTS:    Imaging Personally Reviewed:    Consultant(s) Notes Reviewed:      Care Discussed with Consultants/Other Providers:

## 2019-12-06 NOTE — PROGRESS NOTE ADULT - ASSESSMENT
63y/o F well known to diabetes team from frequent admissions with SOB/CP. Pt w/h/o uncontrolled TIDM (HbA1c 8.1% 9/19 now 8.7%). DM c/b neuropathy and retinopathy as well as CAD s/p multiple stents, CHF (EF 30%), TIA, PVD s/p b/l fem-pop bypass, ESRD> HD here w/SOB on IV abx for pneumonia. Hospital course c/b severe hyperglycemia. Tolerating POs. No further episodes of severe hyperglycemia over past 24 hours. BG goal (100-200mg/dl). Pt is high risk given low health literacy, erratic PO intake and insulin sensitivity.

## 2019-12-06 NOTE — PROGRESS NOTE ADULT - SUBJECTIVE AND OBJECTIVE BOX
[facility]   [phone]  DIALYSIS NOTE  Chief Complaint: ESRD/Ongoing hemodialysis requirement. seen on hd    24 hour events/subjective:      states breathing is better. no tarry stools       ALLERGIES & MEDICATIONS  --------------------------------------------------------------------------------  Allergies    No Known Allergies    Intolerances      Standing Inpatient Medications  albuterol/ipratropium for Nebulization 3 milliLiter(s) Nebulizer every 6 hours  aspirin enteric coated 81 milliGRAM(s) Oral daily  atorvastatin 20 milliGRAM(s) Oral at bedtime  buDESOnide    Inhalation Suspension 0.5 milliGRAM(s) Inhalation every 12 hours  cefepime   IVPB 1000 milliGRAM(s) IV Intermittent daily  clopidogrel Tablet 75 milliGRAM(s) Oral daily  dextrose 5%. 1000 milliLiter(s) IV Continuous <Continuous>  dextrose 50% Injectable 12.5 Gram(s) IV Push once  dextrose 50% Injectable 25 Gram(s) IV Push once  dextrose 50% Injectable 25 Gram(s) IV Push once  heparin  Injectable 5000 Unit(s) SubCutaneous every 12 hours  hydrALAZINE 50 milliGRAM(s) Oral daily  hydrALAZINE 100 milliGRAM(s) Oral <User Schedule>  insulin glargine Injectable (LANTUS) 11 Unit(s) SubCutaneous at bedtime  insulin lispro (HumaLOG) corrective regimen sliding scale   SubCutaneous <User Schedule>  insulin lispro (HumaLOG) corrective regimen sliding scale   SubCutaneous three times a day before meals  insulin lispro (HumaLOG) corrective regimen sliding scale   SubCutaneous at bedtime  insulin lispro Injectable (HumaLOG) 2 Unit(s) SubCutaneous at bedtime  insulin lispro Injectable (HumaLOG) 4 Unit(s) SubCutaneous three times a day before meals  lisinopril 40 milliGRAM(s) Oral daily  metoprolol succinate ER 50 milliGRAM(s) Oral daily  pantoprazole    Tablet 40 milliGRAM(s) Oral before breakfast  sevelamer carbonate 800 milliGRAM(s) Oral three times a day with meals    PRN Inpatient Medications  acetaminophen   Tablet .. 650 milliGRAM(s) Oral every 6 hours PRN  dextrose 40% Gel 15 Gram(s) Oral once PRN  glucagon  Injectable 1 milliGRAM(s) IntraMuscular once PRN  oxycodone    5 mG/acetaminophen 325 mG 1 Tablet(s) Oral every 8 hours PRN      REVIEW OF SYSTEMS  --------------------------------------------------------------------------------  no itching or rash  no fever or chill  no cp or palp   no sob or cough   no N/V/D/ no abd pain   ext left leg edema         VITALS/PHYSICAL EXAM  --------------------------------------------------------------------------------  T(C): 36.6 (12-06-19 @ 14:35), Max: 37.1 (12-06-19 @ 09:00)  HR: 90 (12-06-19 @ 14:35) (75 - 90)  BP: 120/66 (12-06-19 @ 14:35) (102/62 - 125/63)  RR: 18 (12-06-19 @ 14:35) (16 - 18)  SpO2: 98% (12-06-19 @ 14:35) (97% - 100%)  Wt(kg): --        12-05-19 @ 07:01  -  12-06-19 @ 07:00  --------------------------------------------------------  IN: 710 mL / OUT: 0 mL / NET: 710 mL    12-06-19 @ 07:01  -  12-06-19 @ 20:22  --------------------------------------------------------  IN: 1260 mL / OUT: 2400 mL / NET: -1140 mL      Physical Exam:  		  	  	+ JVD  	Pulm: decrease bs  no wheezing and no rales   	CV: RRR, S1S2; no rub  	Back: No CVA tenderness  	Abd: +BS, soft, nontender/nondistended  	: No suprapubic tenderness  	UE: Warm, no cyanosis  no clubbing,  no edema; no asterixis  	LE: Warm, no cyanosis  no clubbing, LLE 2+  edema no erythema     	  	    LABS/STUDIES  --------------------------------------------------------------------------------              7.4    3.87  >-----------<  173      [12-06-19 @ 11:19]              22.9     134  |  92  |  29  ----------------------------<  126      [12-06-19 @ 09:41]  4.6   |  27  |  5.02        Ca     8.6     [12-06-19 @ 09:41]      Mg     2.1     [12-05-19 @ 02:54]    TPro  7.0  /  Alb  4.0  /  TBili  0.3  /  DBili  x   /  AST  15  /  ALT  19  /  AlkPhos  118  [12-05-19 @ 09:06]                  imp/suggest: ESRD      Hemodialysis Prescription:  	Access:  	Dialyzer: revaclear   	Blood Flow (mL/Min): 400  	Dialysate Flow (mL/Min): 600  	Target UF (Liters):  	Treatment Time:  	Potassium:   	Calcium: 2.5  	  YOLANDA    Vitamin D     continue with hd   see hd flow sheet

## 2019-12-07 LAB
ANION GAP SERPL CALC-SCNC: 17 MMOL/L — SIGNIFICANT CHANGE UP (ref 5–17)
BUN SERPL-MCNC: 20 MG/DL — SIGNIFICANT CHANGE UP (ref 7–23)
CALCIUM SERPL-MCNC: 9.6 MG/DL — SIGNIFICANT CHANGE UP (ref 8.4–10.5)
CHLORIDE SERPL-SCNC: 91 MMOL/L — LOW (ref 96–108)
CO2 SERPL-SCNC: 25 MMOL/L — SIGNIFICANT CHANGE UP (ref 22–31)
CREAT SERPL-MCNC: 4.54 MG/DL — HIGH (ref 0.5–1.3)
GLUCOSE BLDC GLUCOMTR-MCNC: 161 MG/DL — HIGH (ref 70–99)
GLUCOSE BLDC GLUCOMTR-MCNC: 248 MG/DL — HIGH (ref 70–99)
GLUCOSE BLDC GLUCOMTR-MCNC: 271 MG/DL — HIGH (ref 70–99)
GLUCOSE BLDC GLUCOMTR-MCNC: 271 MG/DL — HIGH (ref 70–99)
GLUCOSE BLDC GLUCOMTR-MCNC: 274 MG/DL — HIGH (ref 70–99)
GLUCOSE SERPL-MCNC: 266 MG/DL — HIGH (ref 70–99)
HCT VFR BLD CALC: 28.7 % — LOW (ref 34.5–45)
HCT VFR BLD CALC: 29.7 % — LOW (ref 34.5–45)
HGB BLD-MCNC: 9 G/DL — LOW (ref 11.5–15.5)
HGB BLD-MCNC: 9.2 G/DL — LOW (ref 11.5–15.5)
MCHC RBC-ENTMCNC: 31 GM/DL — LOW (ref 32–36)
MCHC RBC-ENTMCNC: 31.4 GM/DL — LOW (ref 32–36)
MCHC RBC-ENTMCNC: 31.6 PG — SIGNIFICANT CHANGE UP (ref 27–34)
MCHC RBC-ENTMCNC: 31.9 PG — SIGNIFICANT CHANGE UP (ref 27–34)
MCV RBC AUTO: 101.8 FL — HIGH (ref 80–100)
MCV RBC AUTO: 102.1 FL — HIGH (ref 80–100)
NRBC # BLD: 0 /100 WBCS — SIGNIFICANT CHANGE UP (ref 0–0)
NRBC # BLD: 0 /100 WBCS — SIGNIFICANT CHANGE UP (ref 0–0)
PLATELET # BLD AUTO: 187 K/UL — SIGNIFICANT CHANGE UP (ref 150–400)
PLATELET # BLD AUTO: 187 K/UL — SIGNIFICANT CHANGE UP (ref 150–400)
POTASSIUM SERPL-MCNC: 4.8 MMOL/L — SIGNIFICANT CHANGE UP (ref 3.5–5.3)
POTASSIUM SERPL-SCNC: 4.8 MMOL/L — SIGNIFICANT CHANGE UP (ref 3.5–5.3)
RBC # BLD: 2.82 M/UL — LOW (ref 3.8–5.2)
RBC # BLD: 2.91 M/UL — LOW (ref 3.8–5.2)
RBC # FLD: 16 % — HIGH (ref 10.3–14.5)
RBC # FLD: 16 % — HIGH (ref 10.3–14.5)
SODIUM SERPL-SCNC: 133 MMOL/L — LOW (ref 135–145)
WBC # BLD: 4.76 K/UL — SIGNIFICANT CHANGE UP (ref 3.8–10.5)
WBC # BLD: 5.27 K/UL — SIGNIFICANT CHANGE UP (ref 3.8–10.5)
WBC # FLD AUTO: 4.76 K/UL — SIGNIFICANT CHANGE UP (ref 3.8–10.5)
WBC # FLD AUTO: 5.27 K/UL — SIGNIFICANT CHANGE UP (ref 3.8–10.5)

## 2019-12-07 PROCEDURE — 99232 SBSQ HOSP IP/OBS MODERATE 35: CPT

## 2019-12-07 RX ORDER — ERYTHROPOIETIN 10000 [IU]/ML
10000 INJECTION, SOLUTION INTRAVENOUS; SUBCUTANEOUS ONCE
Refills: 0 | Status: COMPLETED | OUTPATIENT
Start: 2019-12-07 | End: 2019-12-07

## 2019-12-07 RX ADMIN — CEFEPIME 100 MILLIGRAM(S): 1 INJECTION, POWDER, FOR SOLUTION INTRAMUSCULAR; INTRAVENOUS at 17:59

## 2019-12-07 RX ADMIN — SEVELAMER CARBONATE 800 MILLIGRAM(S): 2400 POWDER, FOR SUSPENSION ORAL at 12:36

## 2019-12-07 RX ADMIN — CLOPIDOGREL BISULFATE 75 MILLIGRAM(S): 75 TABLET, FILM COATED ORAL at 18:01

## 2019-12-07 RX ADMIN — PANTOPRAZOLE SODIUM 40 MILLIGRAM(S): 20 TABLET, DELAYED RELEASE ORAL at 05:40

## 2019-12-07 RX ADMIN — OXYCODONE AND ACETAMINOPHEN 1 TABLET(S): 5; 325 TABLET ORAL at 12:38

## 2019-12-07 RX ADMIN — LISINOPRIL 40 MILLIGRAM(S): 2.5 TABLET ORAL at 05:40

## 2019-12-07 RX ADMIN — Medication 100 MILLIGRAM(S): at 18:02

## 2019-12-07 RX ADMIN — Medication 0.5 MILLIGRAM(S): at 05:40

## 2019-12-07 RX ADMIN — Medication 4 UNIT(S): at 18:00

## 2019-12-07 RX ADMIN — ATORVASTATIN CALCIUM 20 MILLIGRAM(S): 80 TABLET, FILM COATED ORAL at 21:54

## 2019-12-07 RX ADMIN — Medication 3 MILLILITER(S): at 05:40

## 2019-12-07 RX ADMIN — Medication 4 UNIT(S): at 12:34

## 2019-12-07 RX ADMIN — ERYTHROPOIETIN 10000 UNIT(S): 10000 INJECTION, SOLUTION INTRAVENOUS; SUBCUTANEOUS at 16:06

## 2019-12-07 RX ADMIN — Medication 50 MILLIGRAM(S): at 05:40

## 2019-12-07 RX ADMIN — INSULIN GLARGINE 11 UNIT(S): 100 INJECTION, SOLUTION SUBCUTANEOUS at 21:54

## 2019-12-07 RX ADMIN — Medication 3 MILLILITER(S): at 18:01

## 2019-12-07 RX ADMIN — SEVELAMER CARBONATE 800 MILLIGRAM(S): 2400 POWDER, FOR SUSPENSION ORAL at 09:15

## 2019-12-07 RX ADMIN — SEVELAMER CARBONATE 800 MILLIGRAM(S): 2400 POWDER, FOR SUSPENSION ORAL at 18:02

## 2019-12-07 RX ADMIN — Medication 4 UNIT(S): at 09:14

## 2019-12-07 RX ADMIN — Medication 2: at 12:34

## 2019-12-07 RX ADMIN — Medication 2 UNIT(S): at 21:55

## 2019-12-07 RX ADMIN — Medication 0.5 MILLIGRAM(S): at 18:01

## 2019-12-07 RX ADMIN — Medication 3 MILLILITER(S): at 00:15

## 2019-12-07 RX ADMIN — Medication 81 MILLIGRAM(S): at 18:01

## 2019-12-07 RX ADMIN — Medication 2: at 09:13

## 2019-12-07 NOTE — PROGRESS NOTE ADULT - SUBJECTIVE AND OBJECTIVE BOX
[facility]   [phone]  DIALYSIS NOTE  Chief Complaint: ESRD/Ongoing hemodialysis requirement.     24 hour events/subjective:      states breathing is better.       ALLERGIES & MEDICATIONS  --------------------------------------------------------------------------------  Allergies    No Known Allergies    Intolerances      Standing Inpatient Medications  albuterol/ipratropium for Nebulization 3 milliLiter(s) Nebulizer every 6 hours  aspirin enteric coated 81 milliGRAM(s) Oral daily  atorvastatin 20 milliGRAM(s) Oral at bedtime  buDESOnide    Inhalation Suspension 0.5 milliGRAM(s) Inhalation every 12 hours  cefepime   IVPB 1000 milliGRAM(s) IV Intermittent daily  clopidogrel Tablet 75 milliGRAM(s) Oral daily  dextrose 5%. 1000 milliLiter(s) IV Continuous <Continuous>  dextrose 50% Injectable 12.5 Gram(s) IV Push once  dextrose 50% Injectable 25 Gram(s) IV Push once  dextrose 50% Injectable 25 Gram(s) IV Push once  epoetin azalea Injectable 68103 Unit(s) IV Push once  heparin  Injectable 5000 Unit(s) SubCutaneous every 12 hours  hydrALAZINE 50 milliGRAM(s) Oral daily  hydrALAZINE 100 milliGRAM(s) Oral <User Schedule>  insulin glargine Injectable (LANTUS) 11 Unit(s) SubCutaneous at bedtime  insulin lispro (HumaLOG) corrective regimen sliding scale   SubCutaneous <User Schedule>  insulin lispro (HumaLOG) corrective regimen sliding scale   SubCutaneous three times a day before meals  insulin lispro (HumaLOG) corrective regimen sliding scale   SubCutaneous at bedtime  insulin lispro Injectable (HumaLOG) 2 Unit(s) SubCutaneous at bedtime  insulin lispro Injectable (HumaLOG) 4 Unit(s) SubCutaneous three times a day before meals  lisinopril 40 milliGRAM(s) Oral daily  metoprolol succinate ER 50 milliGRAM(s) Oral daily  pantoprazole    Tablet 40 milliGRAM(s) Oral before breakfast  sevelamer carbonate 800 milliGRAM(s) Oral three times a day with meals    PRN Inpatient Medications  acetaminophen   Tablet .. 650 milliGRAM(s) Oral every 6 hours PRN  dextrose 40% Gel 15 Gram(s) Oral once PRN  glucagon  Injectable 1 milliGRAM(s) IntraMuscular once PRN  oxycodone    5 mG/acetaminophen 325 mG 1 Tablet(s) Oral every 8 hours PRN      REVIEW OF SYSTEMS  --------------------------------------------------------------------------------  no itching or rash  no fever or chill  no cp or palp   no sob or cough   no N/V/D/ no abd pain   ext no edema         VITALS/PHYSICAL EXAM  --------------------------------------------------------------------------------  T(C): 36.4 (12-07-19 @ 04:32), Max: 36.6 (12-06-19 @ 12:45)  HR: 90 (12-07-19 @ 04:32) (83 - 92)  BP: 123/61 (12-07-19 @ 04:32) (120/66 - 134/73)  RR: 18 (12-07-19 @ 04:32) (17 - 18)  SpO2: 100% (12-07-19 @ 04:32) (98% - 100%)  Wt(kg): --        12-06-19 @ 07:01  -  12-07-19 @ 07:00  --------------------------------------------------------  IN: 1500 mL / OUT: 2400 mL / NET: -900 mL      Physical Exam:  		  + JVD  	Pulm: decrease bs  no wheezing and no rales   	CV: RRR, S1S2; no rub  	Back: No CVA tenderness  	Abd: +BS, soft, nontender/nondistended  	: No suprapubic tenderness  	UE: Warm, no cyanosis  no clubbing,  no edema; no asterixis  	LE: Warm, no cyanosis  no clubbing, LLE 2+  edema no erythema   	  		    LABS/STUDIES  --------------------------------------------------------------------------------              9.0    4.76  >-----------<  187      [12-07-19 @ 07:18]              28.7     133  |  91  |  20  ----------------------------<  266      [12-07-19 @ 07:16]  4.8   |  25  |  4.54        Ca     9.6     [12-07-19 @ 07:16]                    imp/suggest: ESRD      Hemodialysis Prescription:  	Access:  	Dialyzer: revaclear   	Blood Flow (mL/Min): 400  	Dialysate Flow (mL/Min): 600  	Target UF (Liters):  	Treatment Time:  	Potassium:   	Calcium: 2.5  	  YOLANDA    Vitamin D     continue with hd   see hd flow sheet

## 2019-12-07 NOTE — PROGRESS NOTE ADULT - ASSESSMENT
· Assessment		  65 yo woman with PMH of CAD, DM1 c/b neuropathy and retinopathy, CHF (EF 30%), mod MR, severe AS, RHF, TIA, severe PVD s/p b/l fempop bypass c/b left thrombosed graft, left subclavian vein stenosis s/p stent, COPD not on O2, gout, hilar lymphadenopathy of unknown etiology, frequent hospitalizations (usually 1-3 times a month) p/w SOB, cough, LLE pain found with possible RLL PNA and LLE cellulitis    Shortness of breath.   - Right lower lung opacity: / PNA.  - finishing antibiotics. ID follow   - Pulmonary follow noted  -Check ambulatory saturation off supplemental O2.     Anemia  - transfuse  - Guaiac    Lower extremity edema.   - LE doppler negative for DVT  - Reference: # [id number] Last prescribed Percocet 5/325 in October 2019  - Percocet prn for pain O/N.   - Vascular evaluation noted    Thrombocytopenia.   - Thrombocytopenia on labs. Unclear if spurious result or true.  - Follow CBC with T&S     Chronic systolic congestive heart failure.  - Appears euvolemic.  - Continue with Lisinopril.   - Cardiology follow.    ESRD (end stage renal disease).   - Renal follow Dr. Schmidt  - HD    CAD (coronary artery disease).   -  Troponin elevated likely in setting of ESRD. Similar level to prior labs  - EKG unchanged  - Continue Hydralazine  - Per patient no longer takes Isosorbide Dinitrate   - Cardiology follow.    Diabetes mellitus type 1.  - Compliant with medications  - Continue with Humalog 3U TID premeal  - Continue with corrective SSI  - Continue with Lantus (will start with 8U tonight) as patient with latest glucose of 134.   - Endocrine follow.    Prophylactic measure.  -  DVT ppx: Hold Pharmacologic DVT in setting of new thrombocytopenia. SCDs    Fall risk protocol.   Pierce Viera MD pager [phone]

## 2019-12-07 NOTE — PROGRESS NOTE ADULT - ASSESSMENT
63 yo woman with CAD (Cath Feb '19 showing 99% RCA, 100% RPLS, 100% Cx) s/p multiple stents/brachytherapy, ESRD (on HD M/T/T/Sa), DM1 c/b neuropathy and retinopathy, CHF (EF 30% per last [facility]), mod MR, severe AS, RHF, TIA, severe PVD s/p b/l fempop bypass c/b left thrombosed graft, left subclavian vein stenosis s/p stent, COPD not on O2, gout, hilar lymphadenopathy of unknown etiology, frequent hospitalizations (usually 1-3 times a month) presented with  SOB of 1 day. had cxr revealing pneumonia.     1- ESRD  2- HTN   3- SHPT  4- Pneumonia    hd 3.5 hr 2.5 liter 2 k bfr 400 dfr 600  F 180

## 2019-12-07 NOTE — PROGRESS NOTE ADULT - SUBJECTIVE AND OBJECTIVE BOX
Patient is a 64y old  Female who presents with a chief complaint of shortness of breath (07 Dec 2019 11:04)      SUBJECTIVE / OVERNIGHT EVENTS: Comfortable without new complaints. Feels better  Review of Systems  chest pain no  palpitations no  sob no  nausea no  headache no    MEDICATIONS  (STANDING):  albuterol/ipratropium for Nebulization 3 milliLiter(s) Nebulizer every 6 hours  aspirin enteric coated 81 milliGRAM(s) Oral daily  atorvastatin 20 milliGRAM(s) Oral at bedtime  buDESOnide    Inhalation Suspension 0.5 milliGRAM(s) Inhalation every 12 hours  cefepime   IVPB 1000 milliGRAM(s) IV Intermittent daily  clopidogrel Tablet 75 milliGRAM(s) Oral daily  dextrose 5%. 1000 milliLiter(s) (50 mL/Hr) IV Continuous <Continuous>  dextrose 50% Injectable 12.5 Gram(s) IV Push once  dextrose 50% Injectable 25 Gram(s) IV Push once  dextrose 50% Injectable 25 Gram(s) IV Push once  epoetin azalea Injectable 92107 Unit(s) IV Push once  heparin  Injectable 5000 Unit(s) SubCutaneous every 12 hours  hydrALAZINE 50 milliGRAM(s) Oral daily  hydrALAZINE 100 milliGRAM(s) Oral <User Schedule>  insulin glargine Injectable (LANTUS) 11 Unit(s) SubCutaneous at bedtime  insulin lispro (HumaLOG) corrective regimen sliding scale   SubCutaneous <User Schedule>  insulin lispro (HumaLOG) corrective regimen sliding scale   SubCutaneous three times a day before meals  insulin lispro (HumaLOG) corrective regimen sliding scale   SubCutaneous at bedtime  insulin lispro Injectable (HumaLOG) 2 Unit(s) SubCutaneous at bedtime  insulin lispro Injectable (HumaLOG) 4 Unit(s) SubCutaneous three times a day before meals  lisinopril 40 milliGRAM(s) Oral daily  metoprolol succinate ER 50 milliGRAM(s) Oral daily  pantoprazole    Tablet 40 milliGRAM(s) Oral before breakfast  sevelamer carbonate 800 milliGRAM(s) Oral three times a day with meals    MEDICATIONS  (PRN):  acetaminophen   Tablet .. 650 milliGRAM(s) Oral every 6 hours PRN Mild Pain (1 - 3), Moderate Pain (4 - 6)  dextrose 40% Gel 15 Gram(s) Oral once PRN Blood Glucose LESS THAN 70 milliGRAM(s)/deciliter  glucagon  Injectable 1 milliGRAM(s) IntraMuscular once PRN Glucose LESS THAN 70 milligrams/deciliter  oxycodone    5 mG/acetaminophen 325 mG 1 Tablet(s) Oral every 8 hours PRN Severe Pain (7 - 10)      Vital Signs Last 24 Hrs  T(C): 36.4 (07 Dec 2019 04:32), Max: 36.6 (06 Dec 2019 12:45)  T(F): 97.6 (07 Dec 2019 04:32), Max: 97.9 (06 Dec 2019 12:45)  HR: 90 (07 Dec 2019 04:32) (83 - 92)  BP: 123/61 (07 Dec 2019 04:32) (120/66 - 134/73)  BP(mean): --  RR: 18 (07 Dec 2019 04:32) (17 - 18)  SpO2: 100% (07 Dec 2019 04:32) (98% - 100%)    PHYSICAL EXAM:  GENERAL: NAD, well-developed  HEAD:  Atraumatic, Normocephalic  EYES: EOMI, PERRLA, conjunctiva and sclera clear  NECK: Supple, No JVD  CHEST/LUNG: Clear to auscultation bilaterally; No wheeze  HEART: Regular rate and rhythm; No murmurs, rubs, or gallops  ABDOMEN: Soft, Nontender, Nondistended; Bowel sounds present  EXTREMITIES:  2+ Peripheral Pulses, No clubbing, cyanosis, or edema  PSYCH: AAOx3  NEUROLOGY: non-focal  SKIN: No rashes or lesions    LABS:                        9.0    4.76  )-----------( 187      ( 07 Dec 2019 07:18 )             28.7     12-07    133<L>  |  91<L>  |  20  ----------------------------<  266<H>  4.8   |  25  |  4.54<H>    Ca    9.6      07 Dec 2019 07:16                  RADIOLOGY & ADDITIONAL TESTS:    Imaging Personally Reviewed:    Consultant(s) Notes Reviewed:      Care Discussed with Consultants/Other Providers:

## 2019-12-07 NOTE — PROGRESS NOTE ADULT - SUBJECTIVE AND OBJECTIVE BOX
PULMONARY PROGRESS NOTE    NATHAN MEEKS  MRN-[id number]    Patient is a 64y old  Female who presents with a chief complaint of shortness of breath (02 Dec 2019 07:20)      HPI:  feeling better, standing up walking [address] on room air, no complaints    ROS:   neg    MEDICATIONS  (STANDING):  albuterol/ipratropium for Nebulization 3 milliLiter(s) Nebulizer every 6 hours  aspirin enteric coated 81 milliGRAM(s) Oral daily  atorvastatin 20 milliGRAM(s) Oral at bedtime  buDESOnide    Inhalation Suspension 0.5 milliGRAM(s) Inhalation every 12 hours  cefepime   IVPB 1000 milliGRAM(s) IV Intermittent daily  clopidogrel Tablet 75 milliGRAM(s) Oral daily  dextrose 5%. 1000 milliLiter(s) (50 mL/Hr) IV Continuous <Continuous>  dextrose 50% Injectable 12.5 Gram(s) IV Push once  dextrose 50% Injectable 25 Gram(s) IV Push once  dextrose 50% Injectable 25 Gram(s) IV Push once  epoetin azalea Injectable 50979 Unit(s) IV Push once  heparin  Injectable 5000 Unit(s) SubCutaneous every 12 hours  hydrALAZINE 50 milliGRAM(s) Oral daily  hydrALAZINE 100 milliGRAM(s) Oral <User Schedule>  insulin glargine Injectable (LANTUS) 11 Unit(s) SubCutaneous at bedtime  insulin lispro (HumaLOG) corrective regimen sliding scale   SubCutaneous <User Schedule>  insulin lispro (HumaLOG) corrective regimen sliding scale   SubCutaneous three times a day before meals  insulin lispro (HumaLOG) corrective regimen sliding scale   SubCutaneous at bedtime  insulin lispro Injectable (HumaLOG) 2 Unit(s) SubCutaneous at bedtime  insulin lispro Injectable (HumaLOG) 4 Unit(s) SubCutaneous three times a day before meals  lisinopril 40 milliGRAM(s) Oral daily  metoprolol succinate ER 50 milliGRAM(s) Oral daily  pantoprazole    Tablet 40 milliGRAM(s) Oral before breakfast  sevelamer carbonate 800 milliGRAM(s) Oral three times a day with meals    MEDICATIONS  (PRN):  acetaminophen   Tablet .. 650 milliGRAM(s) Oral every 6 hours PRN Mild Pain (1 - 3), Moderate Pain (4 - 6)  dextrose 40% Gel 15 Gram(s) Oral once PRN Blood Glucose LESS THAN 70 milliGRAM(s)/deciliter  glucagon  Injectable 1 milliGRAM(s) IntraMuscular once PRN Glucose LESS THAN 70 milligrams/deciliter  oxycodone    5 mG/acetaminophen 325 mG 1 Tablet(s) Oral every 8 hours PRN Severe Pain (7 - 10)          EXAM:  Vital Signs Last 24 Hrs  T(C): 36.4 (07 Dec 2019 04:32), Max: 36.6 (06 Dec 2019 12:45)  T(F): 97.6 (07 Dec 2019 04:32), Max: 97.9 (06 Dec 2019 12:45)  HR: 90 (07 Dec 2019 04:32) (83 - 92)  BP: 123/61 (07 Dec 2019 04:32) (120/66 - 134/73)  BP(mean): --  RR: 18 (07 Dec 2019 04:32) (17 - 18)  SpO2: 100% (07 Dec 2019 04:32) (98% - 100%)  GENERAL: The patient is awake and alert in no apparent distress.     LUNGS:faint crackles at bases        LABS/IMAGING: reviewed                                     9.0    4.76  )-----------( 187      ( 07 Dec 2019 07:18 )             28.7   12-07    133<L>  |  91<L>  |  20  ----------------------------<  266<H>  4.8   |  25  |  4.54<H>    Ca    9.6      07 Dec 2019 07:16        < from: Xray Chest 2 Views PA/Lat (12.01.19 @ 17:35) >  IMPRESSION:   New right lower lobe opacity, likely pneumonia.    < end of copied text >  < from: NM Pulmonary Ventilation/Perfusion Scan (12.01.19 @ 22:58) >  IMPRESSION:     Very low probability of pulmonary embolus.    < end of copied text >    < from: CT Chest No Cont (12.03.19 @ 23:13) >  IMPRESSION:     1.  Bilateral lower lobe consolidations and volume loss may represent   areas of atelectasis, however cannot exclude superimposed infection.    2.  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 Recommend continued follow-ups   or treatment.    < end of copied text >      PROBLEM LIST:  64y Female with HEALTH ISSUES - PROBLEM Dx:  pneumonia  COPD  Diabetes mellitus type 1: Diabetes mellitus type 1  CAD (coronary artery disease): CAD (coronary artery disease)  ESRD (end stage renal disease): ESRD (end stage renal disease)  Chronic systolic congestive heart failure: Chronic systolic congestive heart failure  Thrombocytopenia: Thrombocytopenia  Lower extremity edema: Lower extremity edema    RECS:  -Known to have multiple nodule/lymphadenopathy, EBUS in 3/2019 showed reactive lymph nodes.  Will follow up as outpt to discuss needle biopsy although likely not a candidate given all comorbidities, it has been discussed with per multiple times.   - abx per ID  -pulmicort bid, duonebs  -appreciate cards eval  -renal fu  -dc planning when done with abx    Alem Tate MD   [phone]

## 2019-12-07 NOTE — PROGRESS NOTE ADULT - SUBJECTIVE AND OBJECTIVE BOX
Cardiovascular Disease Progress Note    Overnight events: No acute events overnight.  in good spirits this am. feels back to baseline. denies any new cardiac sx  Otherwise review of systems negative    Objective Findings:  T(C): 36.4 (19 @ 04:32), Max: 36.6 (19 @ 12:45)  HR: 90 (19 @ 04:32) (83 - 92)  BP: 123/61 (19 @ 04:32) (120/66 - 134/73)  RR: 18 (19 @ 04:32) (17 - 18)  SpO2: 100% (19 @ 04:32) (98% - 100%)  Wt(kg): --  Daily     Daily Weight in k.1 (07 Dec 2019 04:32)      Physical Exam:  Gen: NAD  HEENT: EOMI  CV: RRR, normal S1 + S2, no m/r/g  Lungs: CTAB  Abd: soft, non-tender  Ext: mild edema    Telemetry: nsr    Laboratory Data:                        9.0    4.76  )-----------( 187      ( 07 Dec 2019 07:18 )             28.7     12-    133<L>  |  91<L>  |  20  ----------------------------<  266<H>  4.8   |  25  |  4.54<H>    Ca    9.6      07 Dec 2019 07:16                Inpatient Medications:  MEDICATIONS  (STANDING):  albuterol/ipratropium for Nebulization 3 milliLiter(s) Nebulizer every 6 hours  aspirin enteric coated 81 milliGRAM(s) Oral daily  atorvastatin 20 milliGRAM(s) Oral at bedtime  buDESOnide    Inhalation Suspension 0.5 milliGRAM(s) Inhalation every 12 hours  cefepime   IVPB 1000 milliGRAM(s) IV Intermittent daily  clopidogrel Tablet 75 milliGRAM(s) Oral daily  dextrose 5%. 1000 milliLiter(s) (50 mL/Hr) IV Continuous <Continuous>  dextrose 50% Injectable 12.5 Gram(s) IV Push once  dextrose 50% Injectable 25 Gram(s) IV Push once  dextrose 50% Injectable 25 Gram(s) IV Push once  epoetin azalea Injectable 45386 Unit(s) IV Push once  heparin  Injectable 5000 Unit(s) SubCutaneous every 12 hours  hydrALAZINE 50 milliGRAM(s) Oral daily  hydrALAZINE 100 milliGRAM(s) Oral <User Schedule>  insulin glargine Injectable (LANTUS) 11 Unit(s) SubCutaneous at bedtime  insulin lispro (HumaLOG) corrective regimen sliding scale   SubCutaneous <User Schedule>  insulin lispro (HumaLOG) corrective regimen sliding scale   SubCutaneous three times a day before meals  insulin lispro (HumaLOG) corrective regimen sliding scale   SubCutaneous at bedtime  insulin lispro Injectable (HumaLOG) 2 Unit(s) SubCutaneous at bedtime  insulin lispro Injectable (HumaLOG) 4 Unit(s) SubCutaneous three times a day before meals  lisinopril 40 milliGRAM(s) Oral daily  metoprolol succinate ER 50 milliGRAM(s) Oral daily  pantoprazole    Tablet 40 milliGRAM(s) Oral before breakfast  sevelamer carbonate 800 milliGRAM(s) Oral three times a day with meals      Assessment:  -volume overload  -SOB  -right lower lobe opacity  -elevated but stable cardiac enzymes (hs trop) with recent admission with relatively preserved ck/ck mb fraction and no obvious ischemic changes on ekg  -hx of NSVT  -pAT  -ischemic cardiomyopathy, cad s/p multiple stents  -esrd on hd  -PAD s/p prior intervention       Recs:  -optimization of volume status and electrolyte abnormalities with hd. currently improved  -vq scan and venous duplex neg on this admission  -cli sx. known pad. s/p art duplex. vascular eval appreciated. pain control, improved  -new rll opacity -->  pulm and ID recs appreciated. ct chest results noted  -known extensive CAD and ICM. s/p [place] 2019 --> severe and diffuse instent restenosis along RCA --> unable to stent due to multiple layers of stenting and prior brachytherapy. denies any true ischemic sx at present  -c/w beta blockers for nsvt/pat/cad (as above). currently on toprol 50mg, hydral and lisinopril uptitrate GDMT as tolerates. wouldnt inc bb at this time 2/2 hx of bronchospasm  -hx of prolonged qtc. avoid qtc prolonging meds  -s/p [facility] 2019: mod-severe MR, pseudo AS (low flow-low gradient), mod-severe LV dysfunction --> recent CTS consult with dr hebert appreciated. plan is for medical management given surgical risk and patient preference  -c/w asa, plavix, statin for ischemic cardiomyopathy/CAD/PAD  -dvt ppx      Over 25 minutes spent on total encounter; more than 50% of the visit was spent counseling and/or coordinating care by the attending physician.      Julián Biswas MD   Cardiovascular Disease  [phone]

## 2019-12-07 NOTE — PROGRESS NOTE ADULT - ASSESSMENT
Patient with pvd, lle bypass with chronic swelling, copd, dm, as admitted for sob , found to have bibasilar pneumonia and dka  Plan:  Complete cefepime today

## 2019-12-07 NOTE — PROGRESS NOTE ADULT - SUBJECTIVE AND OBJECTIVE BOX
CC: f/u for  right sided PNA     REVIEW OF SYSTEMS:  All other review of systems negative except for chronically swollen left leg. Wants to go home now. reports that she is not SOB and is walking in the hallways without a problem     Antimicrobials Day #  : 7/7  cefepime   IVPB 1000 milliGRAM(s) IV Intermittent daily    Other Medications Reviewed    T(F): 97.6 (12-07-19 @ 04:32), Max: 97.9 (12-06-19 @ 12:45)  HR: 90 (12-07-19 @ 04:32)  BP: 123/61 (12-07-19 @ 04:32)  RR: 18 (12-07-19 @ 04:32)  SpO2: 100% (12-07-19 @ 04:32)  Wt(kg): --    PHYSICAL EXAM:  General: alert, no acute distress  Eyes:  anicteric, no conjunctival injection, no discharge  Oropharynx: no lesions or injection 	  Neck: supple, without adenopathy  Lungs: bibasilar rales  to auscultation  Heart: regular rate and rhythm; 2/6 sys m  Abdomen: soft, nondistended, nontender.  Skin: no lesions  Extremities: left leg swollen, no tenderness, no redness, no open wound. right leg no  edema  Neurologic: alert, oriented, moves all extremities    LAB RESULTS:                        9.0    4.76  )-----------( 187      ( 07 Dec 2019 07:18 )             28.7     12-07    133<L>  |  91<L>  |  20  ----------------------------<  266<H>  4.8   |  25  |  4.54<H>    Ca    9.6      07 Dec 2019 07:16          MICROBIOLOGY:  RECENT CULTURES:        RADIOLOGY REVIEWED:

## 2019-12-08 LAB
ANION GAP SERPL CALC-SCNC: 16 MMOL/L — SIGNIFICANT CHANGE UP (ref 5–17)
BUN SERPL-MCNC: 16 MG/DL — SIGNIFICANT CHANGE UP (ref 7–23)
CALCIUM SERPL-MCNC: 9.5 MG/DL — SIGNIFICANT CHANGE UP (ref 8.4–10.5)
CHLORIDE SERPL-SCNC: 92 MMOL/L — LOW (ref 96–108)
CO2 SERPL-SCNC: 26 MMOL/L — SIGNIFICANT CHANGE UP (ref 22–31)
CREAT SERPL-MCNC: 3.91 MG/DL — HIGH (ref 0.5–1.3)
GLUCOSE BLDC GLUCOMTR-MCNC: 228 MG/DL — HIGH (ref 70–99)
GLUCOSE BLDC GLUCOMTR-MCNC: 242 MG/DL — HIGH (ref 70–99)
GLUCOSE BLDC GLUCOMTR-MCNC: 257 MG/DL — HIGH (ref 70–99)
GLUCOSE BLDC GLUCOMTR-MCNC: 258 MG/DL — HIGH (ref 70–99)
GLUCOSE BLDC GLUCOMTR-MCNC: 370 MG/DL — HIGH (ref 70–99)
GLUCOSE BLDC GLUCOMTR-MCNC: 432 MG/DL — HIGH (ref 70–99)
GLUCOSE SERPL-MCNC: 278 MG/DL — HIGH (ref 70–99)
MAGNESIUM SERPL-MCNC: 2.2 MG/DL — SIGNIFICANT CHANGE UP (ref 1.6–2.6)
POTASSIUM SERPL-MCNC: 4.3 MMOL/L — SIGNIFICANT CHANGE UP (ref 3.5–5.3)
POTASSIUM SERPL-SCNC: 4.3 MMOL/L — SIGNIFICANT CHANGE UP (ref 3.5–5.3)
SODIUM SERPL-SCNC: 134 MMOL/L — LOW (ref 135–145)

## 2019-12-08 RX ORDER — INSULIN GLARGINE 100 [IU]/ML
12 INJECTION, SOLUTION SUBCUTANEOUS AT BEDTIME
Refills: 0 | Status: DISCONTINUED | OUTPATIENT
Start: 2019-12-08 | End: 2019-12-09

## 2019-12-08 RX ADMIN — INSULIN GLARGINE 12 UNIT(S): 100 INJECTION, SOLUTION SUBCUTANEOUS at 22:06

## 2019-12-08 RX ADMIN — Medication 2 UNIT(S): at 22:08

## 2019-12-08 RX ADMIN — Medication 0.5 MILLIGRAM(S): at 05:29

## 2019-12-08 RX ADMIN — Medication 4 UNIT(S): at 08:54

## 2019-12-08 RX ADMIN — PANTOPRAZOLE SODIUM 40 MILLIGRAM(S): 20 TABLET, DELAYED RELEASE ORAL at 05:29

## 2019-12-08 RX ADMIN — Medication 4 UNIT(S): at 18:04

## 2019-12-08 RX ADMIN — SEVELAMER CARBONATE 800 MILLIGRAM(S): 2400 POWDER, FOR SUSPENSION ORAL at 18:04

## 2019-12-08 RX ADMIN — Medication 2: at 13:09

## 2019-12-08 RX ADMIN — Medication 1: at 08:55

## 2019-12-08 RX ADMIN — Medication 2: at 02:14

## 2019-12-08 RX ADMIN — Medication 1: at 18:04

## 2019-12-08 RX ADMIN — CLOPIDOGREL BISULFATE 75 MILLIGRAM(S): 75 TABLET, FILM COATED ORAL at 11:36

## 2019-12-08 RX ADMIN — ATORVASTATIN CALCIUM 20 MILLIGRAM(S): 80 TABLET, FILM COATED ORAL at 21:25

## 2019-12-08 RX ADMIN — Medication 3 MILLILITER(S): at 17:16

## 2019-12-08 RX ADMIN — SEVELAMER CARBONATE 800 MILLIGRAM(S): 2400 POWDER, FOR SUSPENSION ORAL at 08:55

## 2019-12-08 RX ADMIN — Medication 4 UNIT(S): at 13:09

## 2019-12-08 RX ADMIN — Medication 50 MILLIGRAM(S): at 05:29

## 2019-12-08 RX ADMIN — OXYCODONE AND ACETAMINOPHEN 1 TABLET(S): 5; 325 TABLET ORAL at 14:00

## 2019-12-08 RX ADMIN — SEVELAMER CARBONATE 800 MILLIGRAM(S): 2400 POWDER, FOR SUSPENSION ORAL at 13:09

## 2019-12-08 RX ADMIN — Medication 81 MILLIGRAM(S): at 11:36

## 2019-12-08 RX ADMIN — Medication 1: at 22:07

## 2019-12-08 RX ADMIN — Medication 3 MILLILITER(S): at 11:36

## 2019-12-08 RX ADMIN — LISINOPRIL 40 MILLIGRAM(S): 2.5 TABLET ORAL at 05:29

## 2019-12-08 RX ADMIN — Medication 0.5 MILLIGRAM(S): at 17:16

## 2019-12-08 RX ADMIN — Medication 100 MILLIGRAM(S): at 17:16

## 2019-12-08 RX ADMIN — Medication 3 MILLILITER(S): at 23:34

## 2019-12-08 RX ADMIN — OXYCODONE AND ACETAMINOPHEN 1 TABLET(S): 5; 325 TABLET ORAL at 13:08

## 2019-12-08 RX ADMIN — Medication 3 MILLILITER(S): at 00:25

## 2019-12-08 RX ADMIN — Medication 3 MILLILITER(S): at 05:29

## 2019-12-08 NOTE — PROGRESS NOTE ADULT - ASSESSMENT
· Assessment		  65 yo woman with PMH of CAD, DM1 c/b neuropathy and retinopathy, CHF (EF 30%), mod MR, severe AS, RHF, TIA, severe PVD s/p b/l fempop bypass c/b left thrombosed graft, left subclavian vein stenosis s/p stent, COPD not on O2, gout, hilar lymphadenopathy of unknown etiology, frequent hospitalizations (usually 1-3 times a month) p/w SOB, cough, LLE pain found with possible RLL PNA and LLE cellulitis    Shortness of breath.   - Right lower lung opacity: / PNA.  - off antibiotics. ID follow   - Pulmonary follow noted  -Check ambulatory saturation off supplemental O2.     Anemia  - transfuse  - Guaiac    Lower extremity edema.   - LE doppler negative for DVT  - Reference: # [id number] Last prescribed Percocet 5/325 in October 2019  - Percocet prn for pain O/N.   - Vascular evaluation noted    Thrombocytopenia.   - Thrombocytopenia on labs. Unclear if spurious result or true.  - Follow CBC with T&S     Chronic systolic congestive heart failure.  - Continue with Lisinopril.   - Cardiology follow.  - fluid overload    ESRD (end stage renal disease).   - Renal follow Dr. Schmidt  - HD    CAD (coronary artery disease).   -  Troponin elevated likely in setting of ESRD. Similar level to prior labs  - EKG unchanged  - Continue Hydralazine  - Per patient no longer takes Isosorbide Dinitrate   - Cardiology follow.    Diabetes mellitus type 1.  - Compliant with medications  - Continue with Humalog 3U TID premeal  - Continue with corrective SSI  - Continue with Lantus (will start with 8U tonight) as patient with latest glucose of 134.   - Endocrine follow.    Prophylactic measure.  -  DVT ppx: Hold Pharmacologic DVT in setting of new thrombocytopenia. SCDs    Fall risk protocol.   Pierce Viera MD pager [phone]

## 2019-12-08 NOTE — PROGRESS NOTE ADULT - SUBJECTIVE AND OBJECTIVE BOX
[facility]   [phone]  RENAL FOLLOW UP NOTE  --------------------------------------------------------------------------------  Chief Complaint:    24 hour events/subjective:    seen earlier.   states no sob or cough       PAST HISTORY  --------------------------------------------------------------------------------  No significant changes to PMH, PSH, FHx, SHx, unless otherwise noted    ALLERGIES & MEDICATIONS  --------------------------------------------------------------------------------  Allergies    No Known Allergies    Intolerances      Standing Inpatient Medications  albuterol/ipratropium for Nebulization 3 milliLiter(s) Nebulizer every 6 hours  aspirin enteric coated 81 milliGRAM(s) Oral daily  atorvastatin 20 milliGRAM(s) Oral at bedtime  buDESOnide    Inhalation Suspension 0.5 milliGRAM(s) Inhalation every 12 hours  clopidogrel Tablet 75 milliGRAM(s) Oral daily  dextrose 5%. 1000 milliLiter(s) IV Continuous <Continuous>  dextrose 50% Injectable 12.5 Gram(s) IV Push once  dextrose 50% Injectable 25 Gram(s) IV Push once  dextrose 50% Injectable 25 Gram(s) IV Push once  heparin  Injectable 5000 Unit(s) SubCutaneous every 12 hours  hydrALAZINE 50 milliGRAM(s) Oral daily  hydrALAZINE 100 milliGRAM(s) Oral <User Schedule>  insulin glargine Injectable (LANTUS) 11 Unit(s) SubCutaneous at bedtime  insulin lispro (HumaLOG) corrective regimen sliding scale   SubCutaneous <User Schedule>  insulin lispro (HumaLOG) corrective regimen sliding scale   SubCutaneous three times a day before meals  insulin lispro (HumaLOG) corrective regimen sliding scale   SubCutaneous at bedtime  insulin lispro Injectable (HumaLOG) 2 Unit(s) SubCutaneous at bedtime  insulin lispro Injectable (HumaLOG) 4 Unit(s) SubCutaneous three times a day before meals  lisinopril 40 milliGRAM(s) Oral daily  metoprolol succinate ER 50 milliGRAM(s) Oral daily  pantoprazole    Tablet 40 milliGRAM(s) Oral before breakfast  sevelamer carbonate 800 milliGRAM(s) Oral three times a day with meals    PRN Inpatient Medications  acetaminophen   Tablet .. 650 milliGRAM(s) Oral every 6 hours PRN  dextrose 40% Gel 15 Gram(s) Oral once PRN  glucagon  Injectable 1 milliGRAM(s) IntraMuscular once PRN  oxycodone    5 mG/acetaminophen 325 mG 1 Tablet(s) Oral every 8 hours PRN      REVIEW OF SYSTEMS  --------------------------------------------------------------------------------    Gen: denies fevers/chills,  CVS: denies chest pain/palpitations  Resp: denies SOB/Cough  GI: Denies N/V/Abd pain  : Denies dysuria    All other systems were reviewed and are negative, except as noted.    VITALS/PHYSICAL EXAM  --------------------------------------------------------------------------------  T(C): 36.4 (12-08-19 @ 12:03), Max: 36.9 (12-08-19 @ 04:45)  HR: 78 (12-08-19 @ 12:03) (78 - 81)  BP: 128/75 (12-08-19 @ 12:03) (118/63 - 134/71)  RR: 18 (12-08-19 @ 12:03) (18 - 18)  SpO2: 100% (12-08-19 @ 12:03) (99% - 100%)  Wt(kg): --        12-07-19 @ 07:01  -  12-08-19 @ 07:00  --------------------------------------------------------  IN: 240 mL / OUT: 2000 mL / NET: -1760 mL    12-08-19 @ 07:01  -  12-08-19 @ 12:19  --------------------------------------------------------  IN: 240 mL / OUT: 0 mL / NET: 240 mL      Physical Exam:  	  + JVD  	Pulm: decrease bs  no wheezing and no rales   	CV: RRR, S1S2; no rub  	Back: No CVA tenderness  	Abd: +BS, soft, nontender/nondistended  	: No suprapubic tenderness  	UE: Warm, no cyanosis  no clubbing,  no edema; no asterixis  	LE: Warm, no cyanosis  no clubbing, LLE 2+  edema no erythema     	    LABS/STUDIES  --------------------------------------------------------------------------------              9.0    4.76  >-----------<  187      [12-07-19 @ 07:18]              28.7     134  |  92  |  16  ----------------------------<  278      [12-08-19 @ 06:52]  4.3   |  26  |  3.91        Ca     9.5     [12-08-19 @ 06:52]      Mg     2.2     [12-08-19 @ 06:52]            Creatinine Trend:  SCr 3.91 [12-08 @ 06:52]  SCr 4.54 [12-07 @ 07:16]  SCr 5.02 [12-06 @ 09:41]  SCr 4.35 [12-05 @ 09:06]  SCr 4.07 [12-05 @ 06:17]                  Iron 67, TIBC 234, %sat 29      [12-04-19 @ 08:38]  Ferritin 1021      [12-04-19 @ 08:40]  PTH -- (Ca 8.3)      [12-04-19 @ 08:38]   952  PTH -- (Ca 9.6)      [06-17-19 @ 18:06]   177  PTH -- (Ca 7.8)      [03-29-19 @ 20:38]   73  PTH -- (Ca 7.3)      [02-22-19 @ 02:49]   59  HbA1c 8.7      [11-13-19 @ 23:44]  TSH 1.78      [11-14-19 @ 09:15]  Lipid: chol 108, TG 76, HDL 57, LDL 36      [11-14-19 @ 09:16]

## 2019-12-08 NOTE — PROGRESS NOTE ADULT - ASSESSMENT
Patient with pvd, lle bypass with chronic swelling, copd, dm, as admitted for sob , found to have bibasilar pneumonia and dka  Plan:  Stable off of antibiotics  Will no longer follow actively, please reconsult with questions or if the status changes thanks

## 2019-12-08 NOTE — PROGRESS NOTE ADULT - SUBJECTIVE AND OBJECTIVE BOX
CC: f/u for right sided PNA    Patient reports feeling great     REVIEW OF SYSTEMS:  All other review of systems negative (Comprehensive ROS)    Antimicrobials Day #  :off of antibiotics    Other Medications Reviewed    T(F): 98.4 (12-08-19 @ 04:45), Max: 98.4 (12-08-19 @ 04:45)  HR: 81 (12-08-19 @ 04:45)  BP: 130/74 (12-08-19 @ 04:45)  RR: 18 (12-08-19 @ 04:45)  SpO2: 100% (12-08-19 @ 04:45)  Wt(kg): --    PHYSICAL EXAM:  General: alert, no acute distress  Eyes:  anicteric, no conjunctival injection, no discharge  Oropharynx: no lesions or injection 	  Neck: supple, without adenopathy  Lungs: basilar rales  Heart: regular rate and rhythm; no murmurs  Abdomen: soft, nondistended, nontender,  Extremities: left leg with chronic edema  Neurologic: alert, oriented, moves all extremities    LAB RESULTS:                        9.0    4.76  )-----------( 187      ( 07 Dec 2019 07:18 )             28.7     12-08    134<L>  |  92<L>  |  16  ----------------------------<  278<H>  4.3   |  26  |  3.91<H>    Ca    9.5      08 Dec 2019 06:52  Mg     2.2     12-08        MICROBIOLOGY:  RECENT CULTURES:            RADIOLOGY REVIEWED:

## 2019-12-08 NOTE — PROGRESS NOTE ADULT - ASSESSMENT
65 yo woman with CAD (Cath Feb '19 showing 99% RCA, 100% RPLS, 100% Cx) s/p multiple stents/brachytherapy, ESRD (on HD M/T/T/Sa), DM1 c/b neuropathy and retinopathy, CHF (EF 30% per last [facility]), mod MR, severe AS, RHF, TIA, severe PVD s/p b/l fempop bypass c/b left thrombosed graft, left subclavian vein stenosis s/p stent, COPD not on O2, gout, hilar lymphadenopathy of unknown etiology, frequent hospitalizations (usually 1-3 times a month) presented with  SOB of 1 day. had cxr revealing pneumonia.     1- ESRD  2- HTN   3- SHPT  4- Pneumonia      HD am   completed abx   cont renvela with meals  to limit fluid intake d/w pt   cont hydralazine   cont epogen tiw for anemia

## 2019-12-08 NOTE — PROGRESS NOTE ADULT - SUBJECTIVE AND OBJECTIVE BOX
Patient is a 64y old  Female who presents with a chief complaint of shortness of breath (08 Dec 2019 12:19)      SUBJECTIVE / OVERNIGHT EVENTS: feels better  Review of Systems  chest pain no  palpitations no  sob no  nausea no  headache no    MEDICATIONS  (STANDING):  albuterol/ipratropium for Nebulization 3 milliLiter(s) Nebulizer every 6 hours  aspirin enteric coated 81 milliGRAM(s) Oral daily  atorvastatin 20 milliGRAM(s) Oral at bedtime  buDESOnide    Inhalation Suspension 0.5 milliGRAM(s) Inhalation every 12 hours  clopidogrel Tablet 75 milliGRAM(s) Oral daily  dextrose 5%. 1000 milliLiter(s) (50 mL/Hr) IV Continuous <Continuous>  dextrose 50% Injectable 12.5 Gram(s) IV Push once  dextrose 50% Injectable 25 Gram(s) IV Push once  dextrose 50% Injectable 25 Gram(s) IV Push once  heparin  Injectable 5000 Unit(s) SubCutaneous every 12 hours  hydrALAZINE 50 milliGRAM(s) Oral daily  hydrALAZINE 100 milliGRAM(s) Oral <User Schedule>  insulin glargine Injectable (LANTUS) 12 Unit(s) SubCutaneous at bedtime  insulin lispro (HumaLOG) corrective regimen sliding scale   SubCutaneous <User Schedule>  insulin lispro (HumaLOG) corrective regimen sliding scale   SubCutaneous three times a day before meals  insulin lispro (HumaLOG) corrective regimen sliding scale   SubCutaneous at bedtime  insulin lispro Injectable (HumaLOG) 2 Unit(s) SubCutaneous at bedtime  insulin lispro Injectable (HumaLOG) 4 Unit(s) SubCutaneous three times a day before meals  lisinopril 40 milliGRAM(s) Oral daily  metoprolol succinate ER 50 milliGRAM(s) Oral daily  pantoprazole    Tablet 40 milliGRAM(s) Oral before breakfast  sevelamer carbonate 800 milliGRAM(s) Oral three times a day with meals    MEDICATIONS  (PRN):  acetaminophen   Tablet .. 650 milliGRAM(s) Oral every 6 hours PRN Mild Pain (1 - 3), Moderate Pain (4 - 6)  dextrose 40% Gel 15 Gram(s) Oral once PRN Blood Glucose LESS THAN 70 milliGRAM(s)/deciliter  glucagon  Injectable 1 milliGRAM(s) IntraMuscular once PRN Glucose LESS THAN 70 milligrams/deciliter  oxycodone    5 mG/acetaminophen 325 mG 1 Tablet(s) Oral every 8 hours PRN Severe Pain (7 - 10)      Vital Signs Last 24 Hrs  T(C): 36.4 (08 Dec 2019 12:03), Max: 36.9 (08 Dec 2019 04:45)  T(F): 97.6 (08 Dec 2019 12:03), Max: 98.4 (08 Dec 2019 04:45)  HR: 78 (08 Dec 2019 12:03) (78 - 81)  BP: 128/75 (08 Dec 2019 12:03) (118/63 - 130/74)  BP(mean): --  RR: 18 (08 Dec 2019 12:03) (18 - 18)  SpO2: 100% (08 Dec 2019 12:03) (99% - 100%)    PHYSICAL EXAM:  GENERAL: NAD, well-developed  HEAD:  Atraumatic, Normocephalic  EYES: EOMI, PERRLA, conjunctiva and sclera clear  NECK: Supple, No JVD  CHEST/LUNG: few crackles to auscultation bilaterally; No wheeze  HEART: Regular rate and rhythm; No murmurs, rubs, or gallops  ABDOMEN: Soft, Nontender, Nondistended; Bowel sounds present  EXTREMITIES:  2+ bipedal edema L>R  PSYCH: AAOx3  NEUROLOGY: non-focal  SKIN: No rashes or lesions    LABS:                        9.0    4.76  )-----------( 187      ( 07 Dec 2019 07:18 )             28.7     12-08    134<L>  |  92<L>  |  16  ----------------------------<  278<H>  4.3   |  26  |  3.91<H>    Ca    9.5      08 Dec 2019 06:52  Mg     2.2     12-08                  RADIOLOGY & ADDITIONAL TESTS:    Imaging Personally Reviewed:    Consultant(s) Notes Reviewed:      Care Discussed with Consultants/Other Providers:

## 2019-12-09 ENCOUNTER — TRANSCRIPTION ENCOUNTER (OUTPATIENT)
Age: 64
End: 2019-12-09

## 2019-12-09 VITALS
DIASTOLIC BLOOD PRESSURE: 74 MMHG | SYSTOLIC BLOOD PRESSURE: 122 MMHG | RESPIRATION RATE: 18 BRPM | OXYGEN SATURATION: 97 %

## 2019-12-09 LAB
ANION GAP SERPL CALC-SCNC: 19 MMOL/L — HIGH (ref 5–17)
BUN SERPL-MCNC: 25 MG/DL — HIGH (ref 7–23)
CALCIUM SERPL-MCNC: 9.3 MG/DL — SIGNIFICANT CHANGE UP (ref 8.4–10.5)
CHLORIDE SERPL-SCNC: 93 MMOL/L — LOW (ref 96–108)
CO2 SERPL-SCNC: 24 MMOL/L — SIGNIFICANT CHANGE UP (ref 22–31)
CREAT SERPL-MCNC: 5.52 MG/DL — HIGH (ref 0.5–1.3)
GLUCOSE BLDC GLUCOMTR-MCNC: 207 MG/DL — HIGH (ref 70–99)
GLUCOSE BLDC GLUCOMTR-MCNC: 236 MG/DL — HIGH (ref 70–99)
GLUCOSE BLDC GLUCOMTR-MCNC: 281 MG/DL — HIGH (ref 70–99)
GLUCOSE BLDC GLUCOMTR-MCNC: 97 MG/DL — SIGNIFICANT CHANGE UP (ref 70–99)
GLUCOSE SERPL-MCNC: 162 MG/DL — HIGH (ref 70–99)
POTASSIUM SERPL-MCNC: 4.2 MMOL/L — SIGNIFICANT CHANGE UP (ref 3.5–5.3)
POTASSIUM SERPL-SCNC: 4.2 MMOL/L — SIGNIFICANT CHANGE UP (ref 3.5–5.3)
SODIUM SERPL-SCNC: 136 MMOL/L — SIGNIFICANT CHANGE UP (ref 135–145)

## 2019-12-09 PROCEDURE — 78582 LUNG VENTILAT&PERFUS IMAGING: CPT

## 2019-12-09 PROCEDURE — 83550 IRON BINDING TEST: CPT

## 2019-12-09 PROCEDURE — 82947 ASSAY GLUCOSE BLOOD QUANT: CPT

## 2019-12-09 PROCEDURE — 83735 ASSAY OF MAGNESIUM: CPT

## 2019-12-09 PROCEDURE — 83540 ASSAY OF IRON: CPT

## 2019-12-09 PROCEDURE — 82310 ASSAY OF CALCIUM: CPT

## 2019-12-09 PROCEDURE — A9567: CPT

## 2019-12-09 PROCEDURE — 84484 ASSAY OF TROPONIN QUANT: CPT

## 2019-12-09 PROCEDURE — 86850 RBC ANTIBODY SCREEN: CPT

## 2019-12-09 PROCEDURE — 84295 ASSAY OF SERUM SODIUM: CPT

## 2019-12-09 PROCEDURE — 97161 PT EVAL LOW COMPLEX 20 MIN: CPT

## 2019-12-09 PROCEDURE — 85379 FIBRIN DEGRADATION QUANT: CPT

## 2019-12-09 PROCEDURE — 85027 COMPLETE CBC AUTOMATED: CPT

## 2019-12-09 PROCEDURE — 83605 ASSAY OF LACTIC ACID: CPT

## 2019-12-09 PROCEDURE — 84100 ASSAY OF PHOSPHORUS: CPT

## 2019-12-09 PROCEDURE — 86901 BLOOD TYPING SEROLOGIC RH(D): CPT

## 2019-12-09 PROCEDURE — P9016: CPT

## 2019-12-09 PROCEDURE — 93005 ELECTROCARDIOGRAM TRACING: CPT

## 2019-12-09 PROCEDURE — 83880 ASSAY OF NATRIURETIC PEPTIDE: CPT

## 2019-12-09 PROCEDURE — 84132 ASSAY OF SERUM POTASSIUM: CPT

## 2019-12-09 PROCEDURE — 93971 EXTREMITY STUDY: CPT

## 2019-12-09 PROCEDURE — 82803 BLOOD GASES ANY COMBINATION: CPT

## 2019-12-09 PROCEDURE — 71250 CT THORAX DX C-: CPT

## 2019-12-09 PROCEDURE — 82728 ASSAY OF FERRITIN: CPT

## 2019-12-09 PROCEDURE — 86900 BLOOD TYPING SEROLOGIC ABO: CPT

## 2019-12-09 PROCEDURE — 86923 COMPATIBILITY TEST ELECTRIC: CPT

## 2019-12-09 PROCEDURE — 99232 SBSQ HOSP IP/OBS MODERATE 35: CPT

## 2019-12-09 PROCEDURE — 80048 BASIC METABOLIC PNL TOTAL CA: CPT

## 2019-12-09 PROCEDURE — 83970 ASSAY OF PARATHORMONE: CPT

## 2019-12-09 PROCEDURE — 93926 LOWER EXTREMITY STUDY: CPT

## 2019-12-09 PROCEDURE — 82435 ASSAY OF BLOOD CHLORIDE: CPT

## 2019-12-09 PROCEDURE — 94640 AIRWAY INHALATION TREATMENT: CPT

## 2019-12-09 PROCEDURE — 99285 EMERGENCY DEPT VISIT HI MDM: CPT | Mod: 25

## 2019-12-09 PROCEDURE — A9540: CPT

## 2019-12-09 PROCEDURE — 71046 X-RAY EXAM CHEST 2 VIEWS: CPT

## 2019-12-09 PROCEDURE — 99261: CPT

## 2019-12-09 PROCEDURE — 82330 ASSAY OF CALCIUM: CPT

## 2019-12-09 PROCEDURE — 80053 COMPREHEN METABOLIC PANEL: CPT

## 2019-12-09 PROCEDURE — 36430 TRANSFUSION BLD/BLD COMPNT: CPT

## 2019-12-09 PROCEDURE — 82010 KETONE BODYS QUAN: CPT

## 2019-12-09 PROCEDURE — 82962 GLUCOSE BLOOD TEST: CPT

## 2019-12-09 PROCEDURE — 85014 HEMATOCRIT: CPT

## 2019-12-09 RX ORDER — INSULIN GLARGINE 100 [IU]/ML
13 INJECTION, SOLUTION SUBCUTANEOUS AT BEDTIME
Refills: 0 | Status: DISCONTINUED | OUTPATIENT
Start: 2019-12-09 | End: 2019-12-09

## 2019-12-09 RX ORDER — ERYTHROPOIETIN 10000 [IU]/ML
10000 INJECTION, SOLUTION INTRAVENOUS; SUBCUTANEOUS ONCE
Refills: 0 | Status: COMPLETED | OUTPATIENT
Start: 2019-12-09 | End: 2019-12-09

## 2019-12-09 RX ORDER — SEVELAMER CARBONATE 2400 MG/1
1 POWDER, FOR SUSPENSION ORAL
Qty: 0 | Refills: 0 | DISCHARGE
Start: 2019-12-09

## 2019-12-09 RX ORDER — IPRATROPIUM/ALBUTEROL SULFATE 18-103MCG
3 AEROSOL WITH ADAPTER (GRAM) INHALATION
Qty: 0 | Refills: 0 | DISCHARGE
Start: 2019-12-09

## 2019-12-09 RX ORDER — INSULIN LISPRO 100/ML
3 VIAL (ML) SUBCUTANEOUS
Qty: 0 | Refills: 0 | DISCHARGE

## 2019-12-09 RX ORDER — ACETAMINOPHEN 500 MG
2 TABLET ORAL
Qty: 0 | Refills: 0 | DISCHARGE
Start: 2019-12-09

## 2019-12-09 RX ADMIN — Medication 3 MILLILITER(S): at 05:18

## 2019-12-09 RX ADMIN — Medication 50 MILLIGRAM(S): at 05:17

## 2019-12-09 RX ADMIN — OXYCODONE AND ACETAMINOPHEN 1 TABLET(S): 5; 325 TABLET ORAL at 14:37

## 2019-12-09 RX ADMIN — Medication 4 UNIT(S): at 14:00

## 2019-12-09 RX ADMIN — Medication 0.5 MILLIGRAM(S): at 05:18

## 2019-12-09 RX ADMIN — ERYTHROPOIETIN 10000 UNIT(S): 10000 INJECTION, SOLUTION INTRAVENOUS; SUBCUTANEOUS at 15:53

## 2019-12-09 RX ADMIN — OXYCODONE AND ACETAMINOPHEN 1 TABLET(S): 5; 325 TABLET ORAL at 15:07

## 2019-12-09 RX ADMIN — SEVELAMER CARBONATE 800 MILLIGRAM(S): 2400 POWDER, FOR SUSPENSION ORAL at 19:31

## 2019-12-09 RX ADMIN — Medication 1: at 14:00

## 2019-12-09 RX ADMIN — CLOPIDOGREL BISULFATE 75 MILLIGRAM(S): 75 TABLET, FILM COATED ORAL at 09:06

## 2019-12-09 RX ADMIN — LISINOPRIL 40 MILLIGRAM(S): 2.5 TABLET ORAL at 05:17

## 2019-12-09 RX ADMIN — SEVELAMER CARBONATE 800 MILLIGRAM(S): 2400 POWDER, FOR SUSPENSION ORAL at 09:06

## 2019-12-09 RX ADMIN — Medication 100 MILLIGRAM(S): at 19:31

## 2019-12-09 RX ADMIN — Medication 2: at 09:06

## 2019-12-09 RX ADMIN — Medication 4 UNIT(S): at 09:07

## 2019-12-09 RX ADMIN — SEVELAMER CARBONATE 800 MILLIGRAM(S): 2400 POWDER, FOR SUSPENSION ORAL at 14:00

## 2019-12-09 RX ADMIN — Medication 3 MILLILITER(S): at 19:31

## 2019-12-09 RX ADMIN — PANTOPRAZOLE SODIUM 40 MILLIGRAM(S): 20 TABLET, DELAYED RELEASE ORAL at 05:17

## 2019-12-09 RX ADMIN — Medication 81 MILLIGRAM(S): at 09:06

## 2019-12-09 RX ADMIN — Medication 3 MILLILITER(S): at 14:00

## 2019-12-09 RX ADMIN — Medication 4 UNIT(S): at 19:33

## 2019-12-09 NOTE — DISCHARGE NOTE PROVIDER - HOSPITAL COURSE
64 year-old female with PMH of CAD (Cath Feb '19 showing 99% RCA, 100% RPLS, 100% Cx - s/p multiple stents/brachytherapy, mod MR, severe AS, RHF, CHF (EF 30% per last [facility]), ESRD (on HD M/T/T/Sa), DM1 c/b neuropathy and retinopathy, TIA, severe PVD s/p b/l fempop bypass c/b left thrombosed graft, left subclavian vein stenosis s/p stent, COPD not on O2, gout, hilar lymphadenopathy of unknown etiology, frequent hospitalizations, presented with c/o SOB, CXR c/w PNA – patient now s/p course of antibiotics.  Elevated but stable cardiac enzymes (hs trop) with recent admission with relatively preserved ck/ck mb fraction and no obvious ischemic changes on EKG.  Patient also volume overloaded – volume status now optimized with HD.  Hospital course c/b mild DKA – improved following HD and now resolved.  Course further complicated by left foot pain, duplex demonstrating patent bypass with distal PT occlusion, patient seen by Vascular Surgery – no vascular surgery intervention indicated - recommend LLE elevation for swelling and pain control as needed. 64 year-old female with PMH of CAD (Cath Feb '19 showing 99% RCA, 100% RPLS, 100% Cx - s/p multiple stents/brachytherapy, mod MR, severe AS, RHF, CHF (EF 30% per last [facility]), ESRD (on HD M/T/T/Sa), DM1 c/b neuropathy and retinopathy, TIA, severe PVD s/p b/l fempop bypass c/b left thrombosed graft, left subclavian vein stenosis s/p stent, COPD not on O2, gout, hilar lymphadenopathy of unknown etiology, frequent hospitalizations, presented with c/o SOB, CXR c/w PNA – patient now s/p course of antibiotics.  Elevated but stable cardiac enzymes (hs trop) with recent admission with relatively preserved ck/ck mb fraction and no obvious ischemic changes on EKG.  Patient also volume overloaded – volume status now optimized with HD.  Hospital course c/b mild DKA – improved following HD and now resolved.  Course further complicated by left foot pain, duplex demonstrating patent bypass with distal PT occlusion, patient seen by Vascular Surgery – no vascular surgery intervention indicated - recommend LLE elevation for swelling and pain control as needed.  Patient has been cleared by Attending and Nephrology for discharge - will be going home with homecare.

## 2019-12-09 NOTE — PROGRESS NOTE ADULT - ASSESSMENT
· Assessment		  65 yo woman with PMH of CAD, DM1 c/b neuropathy and retinopathy, CHF (EF 30%), mod MR, severe AS, RHF, TIA, severe PVD s/p b/l fempop bypass c/b left thrombosed graft, left subclavian vein stenosis s/p stent, COPD not on O2, gout, hilar lymphadenopathy of unknown etiology, frequent hospitalizations (usually 1-3 times a month) p/w SOB, cough, LLE pain found with possible RLL PNA and LLE cellulitis    Shortness of breath improving .   - Right lower lung opacity: / PNA.  - off antibiotics. ID follow   - Pulmonary follow noted    Anemia  - transfuse    Lower extremity edema.   - LE doppler negative for DVT  - Reference: # [id number] Last prescribed Percocet 5/325 in October 2019  - Percocet prn for pain O/N.   - Vascular evaluation noted    Thrombocytopenia.   - Thrombocytopenia on labs. Unclear if spurious result or true.  - Follow CBC with T&S     Chronic systolic congestive heart failure.  - Continue with Lisinopril.   - Cardiology follow.  - fluid overload    ESRD (end stage renal disease).   - Renal follow Dr. Schmidt  - HD    CAD (coronary artery disease).   -  Troponin elevated likely in setting of ESRD. Similar level to prior labs  - EKG unchanged  - Continue Hydralazine  - Per patient no longer takes Isosorbide Dinitrate   - Cardiology follow.    Diabetes mellitus type 1.  - Compliant with medications  - Continue with Humalog 3U TID premeal  - Continue with corrective SSI  - Continue with Lantus (will start with 8U tonight) as patient with latest glucose of 134.   - Endocrine follow.    Prophylactic measure.  -  DVT ppx: Hold Pharmacologic DVT in setting of new thrombocytopenia. SCDs    Fall risk protocol.       DC home. Follow with PMD/ Nephrology/ Cardiology/ Pulmonary in 3-4 days. QA   Pierce Viera MD pager [phone]

## 2019-12-09 NOTE — PROGRESS NOTE ADULT - SUBJECTIVE AND OBJECTIVE BOX
[facility]   [phone]  DIALYSIS NOTE  Chief Complaint: ESRD/Ongoing hemodialysis requirement.    24 hour events/subjective:      seen earlier. no sob       ALLERGIES & MEDICATIONS  --------------------------------------------------------------------------------  Allergies    No Known Allergies    Intolerances      Standing Inpatient Medications  albuterol/ipratropium for Nebulization 3 milliLiter(s) Nebulizer every 6 hours  aspirin enteric coated 81 milliGRAM(s) Oral daily  atorvastatin 20 milliGRAM(s) Oral at bedtime  buDESOnide    Inhalation Suspension 0.5 milliGRAM(s) Inhalation every 12 hours  clopidogrel Tablet 75 milliGRAM(s) Oral daily  dextrose 5%. 1000 milliLiter(s) IV Continuous <Continuous>  dextrose 50% Injectable 12.5 Gram(s) IV Push once  dextrose 50% Injectable 25 Gram(s) IV Push once  dextrose 50% Injectable 25 Gram(s) IV Push once  heparin  Injectable 5000 Unit(s) SubCutaneous every 12 hours  hydrALAZINE 50 milliGRAM(s) Oral daily  hydrALAZINE 100 milliGRAM(s) Oral <User Schedule>  insulin glargine Injectable (LANTUS) 13 Unit(s) SubCutaneous at bedtime  insulin lispro (HumaLOG) corrective regimen sliding scale   SubCutaneous <User Schedule>  insulin lispro (HumaLOG) corrective regimen sliding scale   SubCutaneous three times a day before meals  insulin lispro (HumaLOG) corrective regimen sliding scale   SubCutaneous at bedtime  insulin lispro Injectable (HumaLOG) 2 Unit(s) SubCutaneous at bedtime  insulin lispro Injectable (HumaLOG) 4 Unit(s) SubCutaneous three times a day before meals  lisinopril 40 milliGRAM(s) Oral daily  metoprolol succinate ER 50 milliGRAM(s) Oral daily  pantoprazole    Tablet 40 milliGRAM(s) Oral before breakfast  sevelamer carbonate 800 milliGRAM(s) Oral three times a day with meals    PRN Inpatient Medications  acetaminophen   Tablet .. 650 milliGRAM(s) Oral every 6 hours PRN  dextrose 40% Gel 15 Gram(s) Oral once PRN  glucagon  Injectable 1 milliGRAM(s) IntraMuscular once PRN  oxycodone    5 mG/acetaminophen 325 mG 1 Tablet(s) Oral every 8 hours PRN      REVIEW OF SYSTEMS  --------------------------------------------------------------------------------  no itching or rash  no fever or chill  no cp or palp   no sob or cough   no N/V/D/ no abd pain   ext no edema         VITALS/PHYSICAL EXAM  --------------------------------------------------------------------------------  T(C): 36.5 (12-09-19 @ 14:45), Max: 37 (12-09-19 @ 04:37)  HR: 75 (12-09-19 @ 14:45) (73 - 86)  BP: 107/56 (12-09-19 @ 14:45) (100/63 - 134/66)  RR: 18 (12-09-19 @ 14:45) (18 - 20)  SpO2: 98% (12-09-19 @ 14:45) (97% - 100%)  Wt(kg): --        12-08-19 @ 07:01  -  12-09-19 @ 07:00  --------------------------------------------------------  IN: 480 mL / OUT: 0 mL / NET: 480 mL    12-09-19 @ 07:01  -  12-09-19 @ 16:15  --------------------------------------------------------  IN: 420 mL / OUT: 0 mL / NET: 420 mL      Physical Exam:  		  	  + JVD  	Pulm: decrease bs  no wheezing and no rales   	CV: RRR, S1S2; no rub  	Back: No CVA tenderness  	Abd: +BS, soft, nontender/nondistended  	: No suprapubic tenderness  	UE: Warm, no cyanosis  no clubbing,  no edema; no asterixis  	LE: Warm, no cyanosis  no clubbing, LLE 2+  edema no erythema     	  	  LABS/STUDIES  --------------------------------------------------------------------------------    134  |  92  |  16  ----------------------------<  278      [12-08-19 @ 06:52]  4.3   |  26  |  3.91        Ca     9.5     [12-08-19 @ 06:52]      Mg     2.2     [12-08-19 @ 06:52]                    imp/suggest: ESRD      Hemodialysis Prescription:  	Access:  	Dialyzer: revaclear   	Blood Flow (mL/Min): 400  	Dialysate Flow (mL/Min): 600  	Target UF (Liters):  	Treatment Time:  	Potassium:   	Calcium: 2.5  	  YOLANDA    Vitamin D     continue with hd   see hd flow sheet

## 2019-12-09 NOTE — PROGRESS NOTE ADULT - SUBJECTIVE AND OBJECTIVE BOX
Diabetes Follow up note: Saw pt earlier today  Interval Hx: 63y/o F well known to diabetes team from frequent admissions with SOB/CP. Pt w/h/o uncontrolled TIDM (HbA1c 8.1% 9/19 now 8.7%). DM c/b neuropathy and retinopathy as well as CAD s/p multiple stents, CHF (EF 30%), TIA, PVD s/p b/l fem-pop bypass, ESRD> HD here w/SOB completed IV abx for pneumonia. Hospital course c/b by on/off hyperglycemia due to nutritional indiscretions. Pt tolerating POs and asking for food/snacking.. No hypoglycemia. Glycemic control not at goal with BGs >200s in the last 24 hours. Pt doesn't participate on self DM care and family manages DM when at home. Pt reports no SOB but has O2 on. States she wants to go home.    Review of Systems:  General; As above  General: denies pain   GI: Tolerating POs. Denies N/V/D/Abd pain  CV: Denies CP/SOB  ENDO: No S&Sx of hypoglycemia      MEDS:  atorvastatin 20 milliGRAM(s) Oral at bedtime  insulin glargine Injectable (LANTUS) 12 Unit(s) SubCutaneous at bedtime  insulin lispro (HumaLOG) corrective regimen sliding scale   SubCutaneous <User Schedule>  insulin lispro (HumaLOG) corrective regimen sliding scale   SubCutaneous three times a day before meals  insulin lispro (HumaLOG) corrective regimen sliding scale   SubCutaneous at bedtime  insulin lispro Injectable (HumaLOG) 2 Unit(s) SubCutaneous at bedtime   insulin lispro Injectable (HumaLOG) 4 Unit(s) SubCutaneous three times a day before meals    Allergies    No Known Allergies    PE:  General: Female sitting in chair in NAD.   Vital Signs Last 24 Hrs  T(C): 36.5 (12-09-19 @ 14:45), Max: 37 (12-09-19 @ 04:37)  T(F): 97.7 (12-09-19 @ 14:45), Max: 98.6 (12-09-19 @ 04:37)  HR: 75 (12-09-19 @ 14:45) (73 - 86)  BP: 107/56 (12-09-19 @ 14:45) (100/63 - 134/66)  BP(mean): --  RR: 18 (12-09-19 @ 14:45) (18 - 20)  SpO2: 98% (12-09-19 @ 14:45) (97% - 100%)  Abd: Soft, NT, ND,   Extremities: Warm. +LLE edema  Neuro: A&O X3    LABS:  POCT Blood Glucose.: 207 mg/dL (12-09-19 @ 13:08)  POCT Blood Glucose.: 281 mg/dL (12-09-19 @ 09:01)  POCT Blood Glucose.: 236 mg/dL (12-09-19 @ 02:18)  POCT Blood Glucose.: 258 mg/dL (12-08-19 @ 21:53)  POCT Blood Glucose.: 242 mg/dL (12-08-19 @ 17:31)  POCT Blood Glucose.: 257 mg/dL (12-08-19 @ 12:53)  POCT Blood Glucose.: 228 mg/dL (12-08-19 @ 08:38)  POCT Blood Glucose.: 370 mg/dL (12-08-19 @ 01:45)  POCT Blood Glucose.: 432 mg/dL (12-08-19 @ 00:59)  POCT Blood Glucose.: 248 mg/dL (12-07-19 @ 21:29)  POCT Blood Glucose.: 161 mg/dL (12-07-19 @ 17:37)    12-08    134<L>  |  92<L>  |  16  ----------------------------<  278<H>  4.3   |  26  |  3.91<H>    Ca    9.5      08 Dec 2019 06:52  Mg     2.2     12-08    TPro  7.0  /  Alb  4.0  /  TBili  0.3  /  DBili  x   /  AST  15  /  ALT  19  /  AlkPhos  118  12-05      Thyroid Function Tests:  11-14 @ 09:15 TSH 1.78 FreeT4 -- T3 -- Anti TPO -- Anti Thyroglobulin Ab -- TSI --      Hemoglobin A1C, Whole Blood: 8.7 % <H> [4.0 - 5.6] (11-13-19 @ 23:44)  Hemoglobin A1C, Whole Blood: 8.1 % <H> [4.0 - 5.6] (09-16-19 @ 08:04)

## 2019-12-09 NOTE — PROGRESS NOTE ADULT - PROBLEM SELECTOR PLAN 1
-test BG AC/HS/2AM  -c/w Lantus 11 units QHS  -c/w Humalog 4 units AC meals for now (hold if not eating)  -Change Humalog to 2 units w/HS snack if eating  -change Humalog to low correction scale (correct >200mg/dl) AC and Low HS scale. c/w custom 2AM scale  inform endocrine of hypoglycemia or persistent hyperglycemia episodes as changes in pts insulin regimen will need to be made.   notify endocrine if any plans to be NPO/diet changes as this will also affect insulin regimen  DC: basal bolus. final doses TBD  -Upon discharge pt likely to continue preadmission insulin doses of L12/H4. Family adjust insulin accordingly at home   -Pt to f/u with Dr Ley,  pvt endo as out pt.  discussed w/pt and RN  pager: [phone]
-test BG AC/HS/2AM  -change Lantus to 13 units QHS. Slow titration due to ESRD and insulin sensitivity  -c/w Humalog 4 units AC meals for now (hold if not eating)  -C/w Humalog 2 units w/HS snack if eating  -c/w Humalog low correction scale (correct >200mg/dl) AC and Low HS scale. c/w custom 2AM scale  inform endocrine of hypoglycemia or persistent hyperglycemia episodes as changes in pts insulin regimen will need to be made.   -Notify endocrine if any plans to be NPO/diet changes as this will also affect insulin regimen  DC: basal bolus. final doses TBD  -Upon discharge pt likely to continue preadmission insulin doses of L12/H4. Family adjust insulin accordingly at home   -Pt to f/u with Dr Ley,  pvt endo as out pt.  -Plan discussed with pt/team.  Contact info: [phone] (24/7). pager [phone]
check FSBG TID qac and hs and 3am  carb consistent diet   Increase Lantus to 11 Units qhs   -Humalog 4-4-4 with meals   -low dose SS TIDAC/qhs  -low dose bedtime scale  -Add Humalog 3 Units if patient has HS snack  -custom low dose 3am scale for hyperglycemia   inform endocrine of hypoglycemia or persistent hyperglycemia episodes as changes in pts insulin regimen will need to be made.   notify endocrine if any plans to be NPO/diet changes as this will also affect insulin regimen.    DC: basal bolus. final doses TBD  -Upon discharge pt likely to continue preadmission insulin doses of L12/H4. Family adjust insulin accordingly at home   -Pt to f/u with Dr Ley,  pvt endo as out pt.

## 2019-12-09 NOTE — DISCHARGE NOTE PROVIDER - NSDCCPCAREPLAN_GEN_ALL_CORE_FT
PRINCIPAL DISCHARGE DIAGNOSIS  Diagnosis: Pneumonia  Assessment and Plan of Treatment: Pneumonia is a lung infection that can cause a fever, cough, and trouble breathing.  Call your health care provider upon arrival home from hospital and make a follow up appointment for one week.  If your cough worsens, you develop fever greater than 101', you have shaking chills, a fast heartbeat, trouble breathing and/or feel your are breathing much faster than usual, call your healthcare provider.      SECONDARY DISCHARGE DIAGNOSES  Diagnosis: Chronic systolic congestive heart failure  Assessment and Plan of Treatment: Take all medications as prescribed.  Stop smoking if you currently smoke, and avoid high altitudes.  Weigh yourself daily.  If you gain 3lbs in 3 days, or 5lbs in a week call your Health Care Provider.  Eat a low sodium diet.  Call your Health Care Provider if you have any swelling or increased swelling in your feet, ankles, and/or stomach.  If you experience dizziness, chest pain, or shortness of breath, seek immediate medical attention.    Diagnosis: ESRD (end stage renal disease)  Assessment and Plan of Treatment: Resume dialysis per your outpatient regimen.  Avoid taking NSAIDs (ex: Ibuprofen, Advil, Celebrex, Naprosyn) and other agents that can harm the kidneys such as intravenous contrast for diagnostic testing, combination cold medications, etc. unless you are instructed to do so by your Primary Care Physician.  Have all of your medications adjusted for your renal function by your Health Care Provider.  Blood pressure control is important.  Take all medication as prescribed.  Do not overconsume foods that are high in potassium, such as bananas, until you are instructed to do so by your primary care physician.    Diagnosis: Type 1 diabetes mellitus with hyperglycemia  Assessment and Plan of Treatment: Make sure you get your HgA1c checked every three months.  Check your blood glucose before meals and at bedtime.  It's important not to skip any meals.  Keep a log of your blood glucose results and always take it with you to your doctor appointments.  Keep a list of your current medications including over the counter medications and bring this medication list with you to all your doctor appointments.  If you have not seen your ophthalmologist this year, call for appointment.  Check your feet daily for redness, sores, or openings.  Do not self treat.  If there is no improvement in two days, call your primary care physician for an appointment.  Follow up with your Endocrinologist within 1 week.    Diagnosis: PAD (peripheral artery disease)  Assessment and Plan of Treatment: Take all medications as prescribed.  Follow up with your Vascular Surgeon within 2 weeks.    Diagnosis: CAD (coronary artery disease)  Assessment and Plan of Treatment: Coronary artery disease is a condition where the arteries the supply the heart muscle get clogged with fatty deposits & puts you at risk for a heart attack.  Call your doctor if you have any new pain, pressure, or discomfort in the center of your chest, pain, tingling or discomfort in arms, back, neck, jaw, or stomach, shortness of breath, nausea, vomiting, burping or heartburn, sweating, cold and clammy skin, racing or abnormal heartbeat for more than 10 minutes or if they keep coming & going.  Call 911 and do not try to get to hospital by car.  You can help yourself with lifestyle changes (quitting smoking if you smoke), eat lots of fruits & vegetables & low fat dairy products, not a lot of meat & fatty foods, walk or some form of physical activity most days of the week, lose weight if you are overweight.  Take your cardiac medication as prescribed to lower cholesterol, to lower blood pressure, and control your blood sugar.

## 2019-12-09 NOTE — PROGRESS NOTE ADULT - PROBLEM/PLAN-1
[facility]                          CERTIFICATE OF PREGANANCY      Regarding: Neri Benton      12/26/2017      This is to certify that Neri Benton has been under my care for her pregnancy and is due on 3/1/2018.     If you have any questions, please call our office at [phone].    Thank you,        Elizabeth Escalante MD      [facility]  [address]  [place] [address]  [email or web address]  Phone: [phone]  
DISPLAY PLAN FREE TEXT

## 2019-12-09 NOTE — DISCHARGE NOTE PROVIDER - PROVIDER TOKENS
PROVIDER:[TOKEN:[[id number]]],PROVIDER:[TOKEN:[[id number]]],PROVIDER:[TOKEN:[[id number]]],PROVIDER:[TOKEN:[[id number]]]

## 2019-12-09 NOTE — PROGRESS NOTE ADULT - SUBJECTIVE AND OBJECTIVE BOX
Cardiovascular Disease Progress Note    Overnight events: No acute events overnight.  sob much improved. no new cardiac sx  Otherwise review of systems negative    Objective Findings:  T(C): 37 (19 @ 04:37), Max: 37 (19 @ 04:37)  HR: 86 (19 @ 04:37) (78 - 86)  BP: 116/67 (19 @ 04:37) (116/67 - 134/66)  RR: 20 (19 @ 04:37) (18 - 20)  SpO2: 98% (19 @ 04:37) (98% - 100%)  Wt(kg): --  Daily     Daily Weight in k.2 (09 Dec 2019 04:37)      Physical Exam:  Gen: NAD  HEENT: EOMI  CV: RRR, normal S1 + S2, no m/r/g  Lungs: CTAB  Abd: soft, non-tender  Ext: trace edema    Telemetry: nsr    Laboratory Data:        134<L>  |  92<L>  |  16  ----------------------------<  278<H>  4.3   |  26  |  3.91<H>    Ca    9.5      08 Dec 2019 06:52  Mg     2.2                     Inpatient Medications:  MEDICATIONS  (STANDING):  albuterol/ipratropium for Nebulization 3 milliLiter(s) Nebulizer every 6 hours  aspirin enteric coated 81 milliGRAM(s) Oral daily  atorvastatin 20 milliGRAM(s) Oral at bedtime  buDESOnide    Inhalation Suspension 0.5 milliGRAM(s) Inhalation every 12 hours  clopidogrel Tablet 75 milliGRAM(s) Oral daily  dextrose 5%. 1000 milliLiter(s) (50 mL/Hr) IV Continuous <Continuous>  dextrose 50% Injectable 12.5 Gram(s) IV Push once  dextrose 50% Injectable 25 Gram(s) IV Push once  dextrose 50% Injectable 25 Gram(s) IV Push once  heparin  Injectable 5000 Unit(s) SubCutaneous every 12 hours  hydrALAZINE 50 milliGRAM(s) Oral daily  hydrALAZINE 100 milliGRAM(s) Oral <User Schedule>  insulin glargine Injectable (LANTUS) 12 Unit(s) SubCutaneous at bedtime  insulin lispro (HumaLOG) corrective regimen sliding scale   SubCutaneous <User Schedule>  insulin lispro (HumaLOG) corrective regimen sliding scale   SubCutaneous three times a day before meals  insulin lispro (HumaLOG) corrective regimen sliding scale   SubCutaneous at bedtime  insulin lispro Injectable (HumaLOG) 2 Unit(s) SubCutaneous at bedtime  insulin lispro Injectable (HumaLOG) 4 Unit(s) SubCutaneous three times a day before meals  lisinopril 40 milliGRAM(s) Oral daily  metoprolol succinate ER 50 milliGRAM(s) Oral daily  pantoprazole    Tablet 40 milliGRAM(s) Oral before breakfast  sevelamer carbonate 800 milliGRAM(s) Oral three times a day with meals      Assessment:  -volume overload  -SOB  -right lower lobe opacity  -elevated but stable cardiac enzymes (hs trop) with recent admission with relatively preserved ck/ck mb fraction and no obvious ischemic changes on ekg  -hx of NSVT  -pAT  -ischemic cardiomyopathy, cad s/p multiple stents  -esrd on hd  -PAD s/p prior intervention       Recs:  -optimization of volume status and electrolyte abnormalities with hd. currently improved  -vq scan and venous duplex neg on this admission  -cli sx. known pad. s/p art duplex. vascular eval appreciated. pain control, improved  -new rll opacity -->  pulm and ID recs appreciated. ct chest results noted. s/p abx  -known extensive CAD and ICM. s/p [facility] 2019 --> severe and diffuse instent restenosis along RCA --> unable to stent due to multiple layers of stenting and prior brachytherapy. denies any true ischemic sx at present  -c/w beta blockers for nsvt/pat/cad (as above). currently on toprol 50mg, hydral and lisinopril uptitrate GDMT as tolerates. wouldnt inc bb at this time 2/2 hx of bronchospasm  -hx of prolonged qtc. avoid qtc prolonging meds  -s/p TTE 2019: mod-severe MR, pseudo AS (low flow-low gradient), mod-severe LV dysfunction --> recent CTS consult with dr hebert appreciated. plan is for medical management given surgical risk and patient preference  -c/w asa, plavix, statin for ischemic cardiomyopathy/CAD/PAD  -dvt ppx  -dispo planning        Over 25 minutes spent on total encounter; more than 50% of the visit was spent counseling and/or coordinating care by the attending physician.      Julián Biswas MD   Cardiovascular Disease  [phone]

## 2019-12-09 NOTE — PROGRESS NOTE ADULT - ASSESSMENT
65 yo woman with CAD (Cath Feb '19 showing 99% RCA, 100% RPLS, 100% Cx) s/p multiple stents/brachytherapy, ESRD (on HD M/T/T/Sa), DM1 c/b neuropathy and retinopathy, CHF (EF 30% per last [facility]), mod MR, severe AS, RHF, TIA, severe PVD s/p b/l fempop bypass c/b left thrombosed graft, left subclavian vein stenosis s/p stent, COPD not on O2, gout, hilar lymphadenopathy of unknown etiology, frequent hospitalizations (usually 1-3 times a month) presented with  SOB of 1 day. had cxr revealing pneumonia.     1- ESRD  2- HTN   3- SHPT  4- Pneumonia      hd 3.5 hr 2.5 liter 2 k bfr 400 dfr 600  F 180   cont binders

## 2019-12-09 NOTE — DISCHARGE NOTE PROVIDER - CARE PROVIDERS DIRECT ADDRESSES
,DirectAddress_Unknown,[email or web address],DirectAddress_Unknown,DirectAddress_Unknown

## 2019-12-09 NOTE — CHART NOTE - NSCHARTNOTEFT_GEN_A_CORE
Patient with pulse of 99% on room air with ambulation, however noted to be on supplemental O2 frequently based on flowsheet charting and per RN.  Advised patient that it would be beneficial for her to stay in hospital overnight in order to allow reassessment of her supplemental O2 needs.  Patient adamantly refused - stating that she is going home today.  Attending informed of preceding.  Discharge order placed.  Homecare and resumption of HD facilitated by LAURA Jordan NP  [phone] Patient with pulse of 99% on room air with ambulation, however noted to be on supplemental O2 frequently based on flowsheet charting and per RN.  Advised patient that it would be beneficial for her to stay in hospital overnight in order to allow reassessment of her supplemental O2 needs.  Patient adamantly refused - stating that she is going home today.  Attending informed of preceding.  Homecare and resumption of HD facilitated by CM.  Patient to be discharged upon arrival back to floor if stable.    Miranda Jordan NP  [phone]

## 2019-12-09 NOTE — DISCHARGE NOTE PROVIDER - CARE PROVIDER_API CALL
Arsalan Castro)  Internal Medicine  [address] [place] [address]  Phone: [phone]  Fax: [phone]  Follow Up Time:     Braden Thompson ()  Internal Medicine; Pulmonary Disease  [address] [facility], [address]  [place] [address]  Phone: [phone]  Fax: [phone]  Follow Up Time:     Tee Biswas)  Cardiovascular Disease; Internal Medicine  [address] [address] [address]  Phone: [phone]  Fax: [phone]  Follow Up Time:     Pina Ley)  EndocrinologyMetabDiabetes  [address] [place] [address]  Phone: [phone]  Fax: [phone]  Follow Up Time:

## 2019-12-09 NOTE — DISCHARGE NOTE NURSING/CASE MANAGEMENT/SOCIAL WORK - PATIENT PORTAL LINK FT
You can access the FollowMyHealth Patient Portal offered by [facility] by registering at the following website: [email or web address]. By joining [facility]’s FollowMyHealth portal, you will also be able to view your health information using other applications (apps) compatible with our system.

## 2019-12-09 NOTE — DISCHARGE NOTE PROVIDER - NSDCMRMEDTOKEN_GEN_ALL_CORE_FT
aspirin 81 mg oral delayed release tablet: 1 tab(s) orally once a day  atorvastatin 20 mg oral tablet: 1 tab(s) orally once a day  Basaglar KwikPen 100 units/mL subcutaneous solution: 12 unit(s) subcutaneous once a day (at bedtime)  budesonide 0.5 mg/2 mL inhalation suspension: 2 milliliter(s) by nebulizer 2 times a day   clopidogrel 75 mg oral tablet: 1 tab(s) orally once a day  HumaLOG KwikPen 100 units/mL injectable solution: 3 unit(s) subcutaneous with meals  hydrALAZINE 25 mg oral tablet: 2 tab(s) orally once a day (AM)  4 tab(s) orally once a day (PM)    NOTE: Rx dispensed as 2tabs 3times a day  ipratropium-albuterol 0.5 mg-2.5 mg/3 mLinhalation solution: 3 milliliter(s) inhaled every 6 hours as needed for wheezing/shortness of breath  lisinopril 40 mg oral tablet: 1 tab(s) orally once a day  metoprolol succinate 50 mg oral tablet, extended release: 1 tab(s) orally once a day  pantoprazole 40 mg oral delayed release tablet: 1 tab(s) orally once a day  Percocet 5/325 oral tablet: 1 tab(s) orally 2 times a day  sevelamer carbonate 800 mg oral tablet: 1 tab(s) orally 3 times a day (with meals) acetaminophen 325 mg oral tablet: 2 tab(s) orally every 6 hours, As Needed - 3), Moderate Pain (4 - 6)  aspirin 81 mg oral delayed release tablet: 1 tab(s) orally once a day  atorvastatin 20 mg oral tablet: 1 tab(s) orally once a day  Basaglar KwikPen 100 units/mL subcutaneous solution: 12 unit(s) subcutaneous once a day (at bedtime)  budesonide 0.5 mg/2 mL inhalation suspension: 2 milliliter(s) inhaled 2 times a day  clopidogrel 75 mg oral tablet: 1 tab(s) orally once a day  HumaLOG KwikPen 100 units/mL injectable solution: 4 unit(s) subcutaneous 3 times a day - Family to adjust insulin levels based on PO intake and fingersticks as previously discussed with the Endocrinology Team  hydrALAZINE 25 mg oral tablet: 2 tab(s) orally once a day (AM)  4 tab(s) orally once a day (PM)    NOTE: Rx dispensed as 2tabs 3times a day  ipratropium-albuterol 0.5 mg-2.5 mg/3 mLinhalation solution: 3 milliliter(s) inhaled every 6 hours  lisinopril 40 mg oral tablet: 1 tab(s) orally once a day  metoprolol succinate 50 mg oral tablet, extended release: 1 tab(s) orally once a day  pantoprazole 40 mg oral delayed release tablet: 1 tab(s) orally once a day  Percocet 5/325 oral tablet: 1 tab(s) orally 2 times a day, As Needed  sevelamer carbonate 800 mg oral tablet: 1 tab(s) orally 3 times a day (with meals)

## 2019-12-09 NOTE — PROGRESS NOTE ADULT - ASSESSMENT
63y/o F well known to diabetes team from frequent admissions with SOB/CP. Pt w/h/o uncontrolled TIDM  c/b neuropathy and retinopathy as well as CAD s/p multiple stents, CHF (EF 30%), TIA, PVD s/p b/l fem-pop bypass, ESRD> HD. Here w/SOB completed IV abx for pneumonia. On/off hyperglycemia due to nutritional indiscretions. No hypoglycemia. Glycemic control not at goal with BGs >200s in the last 24 hours. Will increase basal insulin by 1 units to improved overall control/. Slow titration since pt is insulin sensitive with h/o hypoglycemia as well.  Pt doesn't participate on self DM care and family manages DM when at home.

## 2019-12-09 NOTE — PROGRESS NOTE ADULT - SUBJECTIVE AND OBJECTIVE BOX
Patient is a 64y old  Female who presents with a chief complaint of shortness of breath (09 Dec 2019 16:14)      SUBJECTIVE / OVERNIGHT EVENTS: Comfortable without new complaints.   Review of Systems  chest pain no  palpitations no  sob no  nausea no  headache no    MEDICATIONS  (STANDING):  albuterol/ipratropium for Nebulization 3 milliLiter(s) Nebulizer every 6 hours  aspirin enteric coated 81 milliGRAM(s) Oral daily  atorvastatin 20 milliGRAM(s) Oral at bedtime  buDESOnide    Inhalation Suspension 0.5 milliGRAM(s) Inhalation every 12 hours  clopidogrel Tablet 75 milliGRAM(s) Oral daily  dextrose 5%. 1000 milliLiter(s) (50 mL/Hr) IV Continuous <Continuous>  dextrose 50% Injectable 12.5 Gram(s) IV Push once  dextrose 50% Injectable 25 Gram(s) IV Push once  dextrose 50% Injectable 25 Gram(s) IV Push once  heparin  Injectable 5000 Unit(s) SubCutaneous every 12 hours  hydrALAZINE 50 milliGRAM(s) Oral daily  hydrALAZINE 100 milliGRAM(s) Oral <User Schedule>  insulin glargine Injectable (LANTUS) 13 Unit(s) SubCutaneous at bedtime  insulin lispro (HumaLOG) corrective regimen sliding scale   SubCutaneous <User Schedule>  insulin lispro (HumaLOG) corrective regimen sliding scale   SubCutaneous three times a day before meals  insulin lispro (HumaLOG) corrective regimen sliding scale   SubCutaneous at bedtime  insulin lispro Injectable (HumaLOG) 2 Unit(s) SubCutaneous at bedtime  insulin lispro Injectable (HumaLOG) 4 Unit(s) SubCutaneous three times a day before meals  lisinopril 40 milliGRAM(s) Oral daily  metoprolol succinate ER 50 milliGRAM(s) Oral daily  pantoprazole    Tablet 40 milliGRAM(s) Oral before breakfast  sevelamer carbonate 800 milliGRAM(s) Oral three times a day with meals    MEDICATIONS  (PRN):  acetaminophen   Tablet .. 650 milliGRAM(s) Oral every 6 hours PRN Mild Pain (1 - 3), Moderate Pain (4 - 6)  dextrose 40% Gel 15 Gram(s) Oral once PRN Blood Glucose LESS THAN 70 milliGRAM(s)/deciliter  glucagon  Injectable 1 milliGRAM(s) IntraMuscular once PRN Glucose LESS THAN 70 milligrams/deciliter  oxycodone    5 mG/acetaminophen 325 mG 1 Tablet(s) Oral every 8 hours PRN Severe Pain (7 - 10)      Vital Signs Last 24 Hrs  T(C): 36.5 (09 Dec 2019 14:45), Max: 37 (09 Dec 2019 04:37)  T(F): 97.7 (09 Dec 2019 14:45), Max: 98.6 (09 Dec 2019 04:37)  HR: 75 (09 Dec 2019 14:45) (73 - 86)  BP: 107/56 (09 Dec 2019 14:45) (100/63 - 134/66)  BP(mean): --  RR: 18 (09 Dec 2019 14:45) (18 - 20)  SpO2: 98% (09 Dec 2019 14:45) (97% - 100%)    PHYSICAL EXAM:  GENERAL: NAD  HEAD:  Atraumatic, Normocephalic  EYES: EOMI, PERRLA, conjunctiva and sclera clear  NECK: Supple, No JVD  CHEST/LUNG: Clear to auscultation bilaterally; No wheeze  HEART: Regular rate and rhythm; No murmurs, rubs, or gallops  ABDOMEN: Soft, Nontender, Nondistended; Bowel sounds present  EXTREMITIES:  2+ bipedal edema L>R  PSYCH: AAOx3  NEUROLOGY: non-focal  SKIN: No rashes or lesions    LABS:    12-09    136  |  93<L>  |  25<H>  ----------------------------<  162<H>  4.2   |  24  |  5.52<H>    Ca    9.3      09 Dec 2019 16:17  Mg     2.2     12-08                  RADIOLOGY & ADDITIONAL TESTS:    Imaging Personally Reviewed:    Consultant(s) Notes Reviewed:      Care Discussed with Consultants/Other Providers:

## 2020-01-01 NOTE — DISCHARGE NOTE NURSING/CASE MANAGEMENT/SOCIAL WORK - NSCORESITESY/N_GEN_A_CORE_RD
Yes
Posture, length, shape and position symmetric and appropriate for age; movement patterns with normal strength and range of motion; hips without evidence of dislocation on Sanchez and Ortalani maneuvers and by gluteal fold patterns.

## 2020-01-01 NOTE — PROGRESS NOTE ADULT - ASSESSMENT
60 yo F hx DM, CVA, ESRD/HD  LLE bypass  now with LLE pain and swelling CT: Asymmetric enlargement of the left calf with confluent edema in the   superficial soft tissues.   day 4 of empiric Vancomycin for possible early cellulitis  Last Vanco level 16.9, s/p redose 1g IV on 6/6  PLAN: awaiting arterial dopplers as per vascular, will repeat vanco level and redose if needed 60 yo F hx DM, CVA, ESRD/HD  LLE bypass  now with LLE pain and swelling CT: Asymmetric enlargement of the left calf with confluent edema in the   superficial soft tissues.   day 4 of empiric Vancomycin for possible early cellulitis  Last Vanco level 16.9, s/p redose 1g IV on 6/6  PLAN: awaiting arterial dopplers as per vascular, will repeat vanco level and redose if needed  if discharged planned soon, then switch to PO minocycline or cont Vanco for another 2-3 days may be given at HD 2020 00:27

## 2020-01-02 ENCOUNTER — INPATIENT (INPATIENT)
Facility: HOSPITAL | Age: 65
LOS: 7 days | Discharge: HOME | DRG: 562 | End: 2020-01-10
Attending: INTERNAL MEDICINE | Admitting: INTERNAL MEDICINE
Payer: MEDICARE

## 2020-01-02 VITALS
OXYGEN SATURATION: 98 % | RESPIRATION RATE: 20 BRPM | SYSTOLIC BLOOD PRESSURE: 145 MMHG | WEIGHT: 117.95 LBS | HEART RATE: 70 BPM | DIASTOLIC BLOOD PRESSURE: 79 MMHG | TEMPERATURE: 98 F | HEIGHT: 64 IN

## 2020-01-02 DIAGNOSIS — Z98.89 OTHER SPECIFIED POSTPROCEDURAL STATES: Chronic | ICD-10-CM

## 2020-01-02 DIAGNOSIS — E87.5 HYPERKALEMIA: ICD-10-CM

## 2020-01-02 LAB
ALBUMIN SERPL ELPH-MCNC: 4.5 G/DL — SIGNIFICANT CHANGE UP (ref 3.3–5)
ALP SERPL-CCNC: 153 U/L — HIGH (ref 40–120)
ALT FLD-CCNC: 7 U/L — LOW (ref 10–45)
ANION GAP SERPL CALC-SCNC: 17 MMOL/L — SIGNIFICANT CHANGE UP (ref 5–17)
APTT BLD: 32.5 SEC — SIGNIFICANT CHANGE UP (ref 27.5–36.3)
AST SERPL-CCNC: 20 U/L — SIGNIFICANT CHANGE UP (ref 10–40)
BASE EXCESS BLDV CALC-SCNC: 3.1 MMOL/L — HIGH (ref -2–2)
BASOPHILS # BLD AUTO: 0.04 K/UL — SIGNIFICANT CHANGE UP (ref 0–0.2)
BASOPHILS NFR BLD AUTO: 0.8 % — SIGNIFICANT CHANGE UP (ref 0–2)
BILIRUB SERPL-MCNC: 0.3 MG/DL — SIGNIFICANT CHANGE UP (ref 0.2–1.2)
BUN SERPL-MCNC: 25 MG/DL — HIGH (ref 7–23)
CA-I SERPL-SCNC: 1.15 MMOL/L — SIGNIFICANT CHANGE UP (ref 1.12–1.3)
CALCIUM SERPL-MCNC: 10 MG/DL — SIGNIFICANT CHANGE UP (ref 8.4–10.5)
CHLORIDE BLDV-SCNC: 95 MMOL/L — LOW (ref 96–108)
CHLORIDE SERPL-SCNC: 92 MMOL/L — LOW (ref 96–108)
CO2 BLDV-SCNC: 30 MMOL/L — SIGNIFICANT CHANGE UP (ref 22–30)
CO2 SERPL-SCNC: 25 MMOL/L — SIGNIFICANT CHANGE UP (ref 22–31)
CREAT SERPL-MCNC: 5.95 MG/DL — HIGH (ref 0.5–1.3)
EOSINOPHIL # BLD AUTO: 0.13 K/UL — SIGNIFICANT CHANGE UP (ref 0–0.5)
EOSINOPHIL NFR BLD AUTO: 2.5 % — SIGNIFICANT CHANGE UP (ref 0–6)
ERYTHROCYTE [SEDIMENTATION RATE] IN BLOOD: 23 MM/HR — HIGH (ref 0–20)
GAS PNL BLDV: 131 MMOL/L — LOW (ref 135–145)
GAS PNL BLDV: SIGNIFICANT CHANGE UP
GAS PNL BLDV: SIGNIFICANT CHANGE UP
GLUCOSE BLDV-MCNC: 336 MG/DL — HIGH (ref 70–99)
GLUCOSE SERPL-MCNC: 356 MG/DL — HIGH (ref 70–99)
HCO3 BLDV-SCNC: 29 MMOL/L — SIGNIFICANT CHANGE UP (ref 21–29)
HCT VFR BLD CALC: 43.1 % — SIGNIFICANT CHANGE UP (ref 34.5–45)
HCT VFR BLDA CALC: 39 % — SIGNIFICANT CHANGE UP (ref 39–50)
HGB BLD CALC-MCNC: 12.6 G/DL — SIGNIFICANT CHANGE UP (ref 11.5–15.5)
HGB BLD-MCNC: 13 G/DL — SIGNIFICANT CHANGE UP (ref 11.5–15.5)
IMM GRANULOCYTES NFR BLD AUTO: 0.2 % — SIGNIFICANT CHANGE UP (ref 0–1.5)
INR BLD: 0.97 RATIO — SIGNIFICANT CHANGE UP (ref 0.88–1.16)
LACTATE BLDV-MCNC: 2.2 MMOL/L — HIGH (ref 0.7–2)
LYMPHOCYTES # BLD AUTO: 1.16 K/UL — SIGNIFICANT CHANGE UP (ref 1–3.3)
LYMPHOCYTES # BLD AUTO: 22.1 % — SIGNIFICANT CHANGE UP (ref 13–44)
MANUAL SMEAR VERIFICATION: SIGNIFICANT CHANGE UP
MCHC RBC-ENTMCNC: 30.2 GM/DL — LOW (ref 32–36)
MCHC RBC-ENTMCNC: 31.7 PG — SIGNIFICANT CHANGE UP (ref 27–34)
MCV RBC AUTO: 105.1 FL — HIGH (ref 80–100)
MONOCYTES # BLD AUTO: 0.7 K/UL — SIGNIFICANT CHANGE UP (ref 0–0.9)
MONOCYTES NFR BLD AUTO: 13.4 % — SIGNIFICANT CHANGE UP (ref 2–14)
NEUTROPHILS # BLD AUTO: 3.2 K/UL — SIGNIFICANT CHANGE UP (ref 1.8–7.4)
NEUTROPHILS NFR BLD AUTO: 61 % — SIGNIFICANT CHANGE UP (ref 43–77)
NRBC # BLD: 0 /100 WBCS — SIGNIFICANT CHANGE UP (ref 0–0)
PCO2 BLDV: 52 MMHG — HIGH (ref 35–50)
PH BLDV: 7.37 — SIGNIFICANT CHANGE UP (ref 7.35–7.45)
PLAT MORPH BLD: NORMAL — SIGNIFICANT CHANGE UP
PLATELET # BLD AUTO: 209 K/UL — SIGNIFICANT CHANGE UP (ref 150–400)
PO2 BLDV: 28 MMHG — SIGNIFICANT CHANGE UP (ref 25–45)
POTASSIUM BLDV-SCNC: 6.6 MMOL/L — CRITICAL HIGH (ref 3.5–5.3)
POTASSIUM SERPL-MCNC: 6.7 MMOL/L — CRITICAL HIGH (ref 3.5–5.3)
POTASSIUM SERPL-SCNC: 6.7 MMOL/L — CRITICAL HIGH (ref 3.5–5.3)
PROT SERPL-MCNC: 7.9 G/DL — SIGNIFICANT CHANGE UP (ref 6–8.3)
PROTHROM AB SERPL-ACNC: 11.2 SEC — SIGNIFICANT CHANGE UP (ref 10–12.9)
RBC # BLD: 4.1 M/UL — SIGNIFICANT CHANGE UP (ref 3.8–5.2)
RBC # FLD: 14.7 % — HIGH (ref 10.3–14.5)
RBC BLD AUTO: SIGNIFICANT CHANGE UP
SAO2 % BLDV: 41 % — LOW (ref 67–88)
SODIUM SERPL-SCNC: 134 MMOL/L — LOW (ref 135–145)
WBC # BLD: 5.24 K/UL — SIGNIFICANT CHANGE UP (ref 3.8–10.5)
WBC # FLD AUTO: 5.24 K/UL — SIGNIFICANT CHANGE UP (ref 3.8–10.5)

## 2020-01-02 PROCEDURE — 99291 CRITICAL CARE FIRST HOUR: CPT | Mod: GC

## 2020-01-02 PROCEDURE — 73630 X-RAY EXAM OF FOOT: CPT | Mod: 26,LT

## 2020-01-02 PROCEDURE — 71045 X-RAY EXAM CHEST 1 VIEW: CPT | Mod: 26

## 2020-01-02 RX ORDER — INSULIN HUMAN 100 [IU]/ML
5 INJECTION, SOLUTION SUBCUTANEOUS ONCE
Refills: 0 | Status: COMPLETED | OUTPATIENT
Start: 2020-01-02 | End: 2020-01-02

## 2020-01-02 RX ORDER — INSULIN HUMAN 100 [IU]/ML
4 INJECTION, SOLUTION SUBCUTANEOUS ONCE
Refills: 0 | Status: DISCONTINUED | OUTPATIENT
Start: 2020-01-02 | End: 2020-01-03

## 2020-01-02 RX ORDER — MORPHINE SULFATE 50 MG/1
4 CAPSULE, EXTENDED RELEASE ORAL ONCE
Refills: 0 | Status: DISCONTINUED | OUTPATIENT
Start: 2020-01-02 | End: 2020-01-02

## 2020-01-02 RX ORDER — PIPERACILLIN AND TAZOBACTAM 4; .5 G/20ML; G/20ML
3.38 INJECTION, POWDER, LYOPHILIZED, FOR SOLUTION INTRAVENOUS ONCE
Refills: 0 | Status: COMPLETED | OUTPATIENT
Start: 2020-01-02 | End: 2020-01-02

## 2020-01-02 RX ADMIN — INSULIN HUMAN 5 UNIT(S): 100 INJECTION, SOLUTION SUBCUTANEOUS at 21:34

## 2020-01-02 RX ADMIN — PIPERACILLIN AND TAZOBACTAM 200 GRAM(S): 4; .5 INJECTION, POWDER, LYOPHILIZED, FOR SOLUTION INTRAVENOUS at 21:31

## 2020-01-02 RX ADMIN — MORPHINE SULFATE 4 MILLIGRAM(S): 50 CAPSULE, EXTENDED RELEASE ORAL at 21:31

## 2020-01-02 NOTE — ED ADULT NURSE NOTE - NSIMPLEMENTINTERV_GEN_ALL_ED
Implemented All Fall with Harm Risk Interventions:  [place] to call system. Call bell, personal items and telephone within reach. Instruct patient to call for assistance. Room bathroom lighting operational. Non-slip footwear when patient is off stretcher. Physically safe environment: no spills, clutter or unnecessary equipment. Stretcher in lowest position, wheels locked, appropriate side rails in place. Provide visual cue, wrist band, yellow gown, etc. Monitor gait and stability. Monitor for mental status changes and reorient to person, place, and time. Review medications for side effects contributing to fall risk. Reinforce activity limits and safety measures with patient and family. Provide visual clues: red socks.

## 2020-01-02 NOTE — ED PROVIDER NOTE - OBJECTIVE STATEMENT
Attending Jana Rincon: 65 y/o female with multiple medical issues including h/o CHF, CAD, DM, ESRD on HD last perofrmed today with h/o PVD and neuorapathy presenting with pain to toe. pt states noticed her toe nail fell off 4 days ago. does not remember injuring the nail. no fevers or chills. today noticed a color change to her nail. tried taking oxycodone for pain without improvement. no recent abx. denies any sob or difficulty breathing

## 2020-01-02 NOTE — ED PROVIDER NOTE - NS ED ROS FT
Constitutional: denies fevers, chills, night sweats, weight loss  HEENT: denies visual changes, cough  Cardiovascular: denies palpitations, chest pain, edema  Respiratory: denies SOB, wheezing  Gastrointestinal: denies N/V/D, abdominal pain, hematochezia, melena  : denies dysuria, urinary urgency, increased frequency  MSK: denies muscle weakness  Skin: denies new rashes or masses  Heme: denies bleeding, bruising  Neuro: denies headache, weakness

## 2020-01-02 NOTE — ED PROVIDER NOTE - PROGRESS NOTE DETAILS
BMP showed K of of 6.7. Patient immediately assessed at bedside. Denies CP, palpitations. Pt immediately had EKG performed which showed no cardiac evidence of hyperkalemia. Pt immediately place on continuous cardiac monitoring. 5 of regular insulin ordered. Patients nephrologists consulted for possible emergent HD vs Lokelma therapy. Repeat BMP ordered. Patient reports she completed a full HD session earlier today. Attending Jana Rincon: pt seen by nephrology will dialyze tonight. pt does have a toe fracture. with erythema podiatry paged. will admit

## 2020-01-02 NOTE — CONSULT NOTE ADULT - SUBJECTIVE AND OBJECTIVE BOX
[facility]  [phone]  NEPHROLOGY      INITIAL CONSULT NOTE  --------------------------------------------------------------------------------  HPI:    63 y/o F  with PMHx of CHF, CAD, DMT1 on insulin, ACS, TIA, CVA, gout, PVD, ESRD on dialysis with multiple admissions  presents today with loss of left 2nd toenail with severe pain four days ago.  Pain gradually worsening. Reports that she woke up with her nail hanging off, has pain on her left 2nd toe. in er noticed with hyperglycemia as well as hyperkalemia with k 6.7 . pt states had hd today. denies eating high k foods renal consult called for urgent dialysis       PAST HISTORY  --------------------------------------------------------------------------------  PAST MEDICAL & SURGICAL HISTORY:  COPD (chronic obstructive pulmonary disease)  Localized enlarged lymph nodes  CHF (congestive heart failure): EF 40-45%  Subclavian vein stenosis, left: s/p stent  DKA, type 1: 1/2015  ACS (acute coronary syndrome): 1/2015 - cath revealed 100% ostial stenosis not amenable to PCI - medical management  TIA (transient ischemic attack): x 2 - 8-9 years ago prior to ASD/VSD repair  CAD (coronary artery disease): s/p stents  Gout: past  CVA (cerebral infarction): with no residual, 8 yrs ago, prior to heart surgery - ST memory loss  Peripheral vascular disease: occluded left fem-pop bypass 5/2015  Diabetes mellitus type 1: Insulin Dependent -  ESRD (end stage renal disease): dialysis  M, tue, thursday, saturday  Hyperlipidemia  Status post device closure of ASD: &quot;shantel&quot;  History of cardiac catheterization: 1/2015 - no intervention  S/P femoral-popliteal bypass surgery: L and R in 2013 with graft; 5/2015 CFA angioplasty left and ileofemoral endarterectomywith vein patch angioplasty of left fem-pop bypass graft  Multiple vascular surgery both leg, left fempop bypass revision 11/2015  AV (arteriovenous fistula): Left AV graft; revision with stent placement 2-3 years ago  S/P cholecystectomy    FAMILY HISTORY:  Family history of smoking  Family history of hypertension  Family history of cancer (Sibling)    PAST SOCIAL HISTORY: past tobacco use     ALLERGIES & MEDICATIONS  --------------------------------------------------------------------------------  Allergies    No Known Allergies    Intolerances      Standing Inpatient Medications  insulin regular  human recombinant. 4 Unit(s) IV Push once    PRN Inpatient Medications      REVIEW OF SYSTEMS  --------------------------------------------------------------------------------  Gen: No  fevers/chills   Skin: + discoloration toe left foot   Head/Eyes/Ears/Mouth: No headache; Normal hearing;  No sinus pain/discomfort, sore throat  Respiratory: No dyspnea, cough, wheezing,  CV: No chest pain, or palp   GI: No abdominal pain, diarrhea, nausea, vomiting, melena  : No dysuria,  hematuria, nocturia  MSK: + left 2 nd toe pain  no back pain  Neuro: No dizziness/lightheadedness,   also with no edema     All other systems were reviewed and are negative, except as noted.    VITALS/PHYSICAL EXAM  --------------------------------------------------------------------------------  T(C): 37 (01-02-20 @ 21:30), Max: 37 (01-02-20 @ 21:30)  HR: 71 (01-02-20 @ 21:30) (70 - 71)  BP: 148/63 (01-02-20 @ 21:30) (145/79 - 148/63)  RR: 15 (01-02-20 @ 21:30) (15 - 20)  SpO2: 97% (01-02-20 @ 21:30) (97% - 98%)  Wt(kg): --  Height (cm): 162.56 (01-02-20 @ 17:26)  Weight (kg): 53.5 (01-02-20 @ 17:26)  BMI (kg/m2): 20.2 (01-02-20 @ 17:26)  BSA (m2): 1.56 (01-02-20 @ 17:26)      Physical Exam:  	Gen: Non toxic but in pain from left 2nd toe   	no jvd ,  	Pulm: decrease bs  no rales or ronchi or wheezing  	CV: RRR, S1S2; no rub  	Back: No CVA tenderness  	Abd: +BS, soft, nontender/nondistended  	: No suprapubic tenderness  	UE: Warm, no cyanosis  no clubbing,  no edema  	LE: Warm, no cyanosis  no clubbing, LLE 1-2-  edema  	Neuro: alert and oriented. speech coherent   	Skin: Warm, no decrease skin turgor  left 2nd toe mild erythema and discoloration with missing nail as well   	Vascular access: LUE + AVF + thrill and bruit      LABS/STUDIES  --------------------------------------------------------------------------------              13.0   5.24  >-----------<  209      [01-02-20 @ 20:30]              43.1     134  |  92  |  25  ----------------------------<  356      [01-02-20 @ 20:30]  6.7   |  25  |  5.95        Ca     10.0     [01-02-20 @ 20:30]    TPro  7.9  /  Alb  4.5  /  TBili  0.3  /  DBili  x   /  AST  20  /  ALT  7   /  AlkPhos  153  [01-02-20 @ 20:30]    PT/INR: PT 11.2 , INR 0.97       [01-02-20 @ 20:30]  PTT: 32.5       [01-02-20 @ 20:30]      Creatinine Trend:  SCr 5.95 [01-02 @ 20:30]  SCr 5.52 [12-09 @ 16:17]  SCr 3.91 [12-08 @ 06:52]  SCr 4.54 [12-07 @ 07:16]  SCr 5.02 [12-06 @ 09:41]    Urinalysis - [06-04-17 @ 08:24]      Color Yellow / Appearance Clear / SG 1.013 / pH 8.5      Gluc 1000 / Ketone Negative  / Bili Negative / Urobili Negative       Blood Negative / Protein >600 / Leuk Est Small / Nitrite Negative      RBC 3-5 / WBC 6-10 / Hyaline  / Gran  / Sq Epi  / Non Sq Epi OCC / Bacteria       Iron 67, TIBC 234, %sat 29      [12-04-19 @ 08:38]  Ferritin 1021      [12-04-19 @ 08:40]  PTH -- (Ca 8.3)      [12-04-19 @ 08:38]   952  PTH -- (Ca 9.6)      [06-17-19 @ 18:06]   177  PTH -- (Ca 7.8)      [03-29-19 @ 20:38]   73  PTH -- (Ca 7.3)      [02-22-19 @ 02:49]   59  HbA1c 8.7      [11-13-19 @ 23:44]  TSH 1.78      [11-14-19 @ 09:15]  Lipid: chol 108, TG 76, HDL 57, LDL 36      [11-14-19 @ 09:16]    HBsAb <3.0      [11-14-19 @ 05:06]  HBsAb Nonreact      [11-14-19 @ 04:43]  HBsAg Nonreact      [11-14-19 @ 04:43]  HBcAb Nonreact      [11-14-19 @ 04:43]  HCV 0.15, Nonreact      [11-14-19 @ 04:43]

## 2020-01-02 NOTE — ED ADULT NURSE NOTE - OBJECTIVE STATEMENT
65y/o F PMHx of COPD, CHF, ACS, TIA, CAD, Gout, CVA, PVD, DM, ESRD, Hyperlipemia coming to the ED c/o of pain in her left 3rd toe. Pt states that 3/4 days ago her toenail fell off and today she began to have constant pain in the L 3rd toe. Pt denies any CP/SOB/Fever/Chills/N/V/D. Pt currently has a fistula in L arm and received dialysis today. On exam, L toe & foot was red, warm, swollen & w/o drainage. Pt states that her L leg is normally more swollen than right. Pt denies any numbness or tingling. Equal sensation bilaterally. Pt had glucose monitoring system on R arm, family removed and took home. IV placed. Labs sent.

## 2020-01-02 NOTE — ED PROVIDER NOTE - PHYSICAL EXAMINATION
Attending Jana Rincon: Gen: NAD, heent: atrauamtic, eomi, perrla, mmm, poor dentition, uvula midline, neck; nttp, no nuchal rigidity, chest: nttp, no crepitus, cv: rrr, no murmurs, lungs: ctab, abd: soft, nontender, nondistended, no peritoneal signs, , ext: left lower extremity swelling, nail removed from 2nd toe, some necrotic foci, no crepitus, , neuro: awake and alert, following commands, speech clear, sensation and strength intact, no focal deficits Attending Jana Rincon: Gen: NAD, heent: atrauamtic, eomi, perrla, mmm, poor dentition, uvula midline, neck; nttp, no nuchal rigidity, chest: nttp, no crepitus, cv: rrr, no murmurs, lungs: ctab, abd: soft, nontender, nondistended, no peritoneal signs, , ext: left lower extremity swelling, nail removed from 2nd toe, some necrotic foci, no crepitus, , neuro: awake and alert, following commands, speech clear, sensation and strength intact, no focal deficits    [facility] PGY3:   PHYSICAL EXAM:   GEN: Age appropriate, resting comfortably in bed, no acute distress, non toxic appearing, speaking in complete sentences.   HEENT: Conjunctiva and sclera normal  PULM: Lungs CTAB, no wheezes, rales, rhonchi  CV: RRR, S1S2, no MRG  MSK: no stiffness or joint effusions  Abdominal: Soft, nontender to palpation, non-distended, +BS  Extremities: LLE edema. L 2nd toe erythematous, TTP, nail absent.   NEURO: AAOx3  Psych: normal affect, normal behavior  Skin: No rashes, lesions    T(C): 36.7 (01-02-20 @ 17:26), Max: 36.7 (01-02-20 @ 17:26)  HR: 70 (01-02-20 @ 17:26) (70 - 70)  BP: 145/79 (01-02-20 @ 17:26) (145/79 - 145/79)  RR: 20 (01-02-20 @ 17:26) (20 - 20)  SpO2: 98% (01-02-20 @ 17:26) (98% - 98%)

## 2020-01-02 NOTE — ED PROVIDER NOTE - RAPID ASSESSMENT
65 y/o F  with PMHx of CHF, CAD, DMT1 on insulin, ACS, TIA, CVA, gout, PVD, ESRD on dialysis presents today with loss of left 2nd toenail with severe pain since yesterday. Reports that she woke up with her nail hanging off. Took Percocet and Oxycodone.    **Pt seen in waiting room by Dr. Hermelindo Her, documentation completed by Chemo Treviño. Pt to be sent to main ED for further evaluation - all orders placed to be followed by MD in the main ED** 63 y/o F  with PMHx of CHF, CAD, DMT1 on insulin, ACS, TIA, CVA, gout, PVD, ESRD on dialysis presents today with loss of left 2nd toenail with severe pain four days ago.  Pain gradually worsening. Reports that she woke up with her nail hanging off, toe now turning black.  Bandaged by EMS, never seen podiatry in past.  No noted trauma. Took Percocet and Oxycodone.    **Pt seen in waiting room by Dr. Hermelindo Her, documentation completed by Chemo Treviño. Pt to be sent to main ED for further evaluation - all orders placed to be followed by MD in the main ED**    Attending:  Patient seen in waiting room on limited basis.  I am attesting to the above documentation alone.  Full history and physical exam to be performed in main Emergency Department.  Hermelindo Her M.D.

## 2020-01-02 NOTE — CONSULT NOTE ADULT - ASSESSMENT
65 y/o F  with PMHx of CHF, CAD, DMT1 on insulin, ACS, TIA, CVA, gout, PVD, ESRD on dialysis with multiple admissions  presents today with loss of left 2nd toenail with severe pain four days ago.  Pain gradually worsening. Reports that she woke up with her nail hanging off, has pain on her left 2nd toe. in er noticed with hyperglycemia as well as hyperkalemia with k 6.7 on admission     1- esrd  2- hyperkalemia  3- HTN   4- shpt   5- cad  6- DM       hyperkalemia despite hd is concerning. suspect high k food intake in this pt however, monitor for malfunctioning avf  hd arrangements made with HD RN. hd consent obtained witnessed.   low k diet   insulin regular given   repeat k   renvela 2400 mg tid   hydralazine 50 mg tid  d.w HD RN and d/w ER team

## 2020-01-02 NOTE — ED PROVIDER NOTE - CLINICAL SUMMARY MEDICAL DECISION MAKING FREE TEXT BOX
Attending Jana Rincon: 65 y/o female with multiple medical issues including ESRD on HD last today presenting with toe pain. pt with known PVD. on exam does have some erythema to toe as well as foot. podiatry consulted and will eval. xray without evidence of gas. labs showed sig hyperkalemia. nephrology consulted as will need emergent dialysis plan to admit. abx given for possible cellulitis

## 2020-01-03 DIAGNOSIS — I50.22 CHRONIC SYSTOLIC (CONGESTIVE) HEART FAILURE: ICD-10-CM

## 2020-01-03 DIAGNOSIS — I73.9 PERIPHERAL VASCULAR DISEASE, UNSPECIFIED: ICD-10-CM

## 2020-01-03 DIAGNOSIS — Z02.9 ENCOUNTER FOR ADMINISTRATIVE EXAMINATIONS, UNSPECIFIED: ICD-10-CM

## 2020-01-03 DIAGNOSIS — E10.65 TYPE 1 DIABETES MELLITUS WITH HYPERGLYCEMIA: ICD-10-CM

## 2020-01-03 DIAGNOSIS — N18.6 END STAGE RENAL DISEASE: ICD-10-CM

## 2020-01-03 DIAGNOSIS — S92.535A NONDISPLACED FRACTURE OF DISTAL PHALANX OF LEFT LESSER TOE(S), INITIAL ENCOUNTER FOR CLOSED FRACTURE: ICD-10-CM

## 2020-01-03 DIAGNOSIS — I25.10 ATHEROSCLEROTIC HEART DISEASE OF NATIVE CORONARY ARTERY WITHOUT ANGINA PECTORIS: ICD-10-CM

## 2020-01-03 DIAGNOSIS — J44.9 CHRONIC OBSTRUCTIVE PULMONARY DISEASE, UNSPECIFIED: ICD-10-CM

## 2020-01-03 DIAGNOSIS — E87.5 HYPERKALEMIA: ICD-10-CM

## 2020-01-03 LAB
ALBUMIN SERPL ELPH-MCNC: 3.9 G/DL — SIGNIFICANT CHANGE UP (ref 3.3–5)
ALP SERPL-CCNC: 135 U/L — HIGH (ref 40–120)
ALT FLD-CCNC: 10 U/L — SIGNIFICANT CHANGE UP (ref 10–45)
ANION GAP SERPL CALC-SCNC: 17 MMOL/L — SIGNIFICANT CHANGE UP (ref 5–17)
ANION GAP SERPL CALC-SCNC: 20 MMOL/L — HIGH (ref 5–17)
AST SERPL-CCNC: 11 U/L — SIGNIFICANT CHANGE UP (ref 10–40)
B-OH-BUTYR SERPL-SCNC: 0.1 MMOL/L — SIGNIFICANT CHANGE UP
BASE EXCESS BLDV CALC-SCNC: 3.7 MMOL/L — HIGH (ref -2–2)
BASOPHILS # BLD AUTO: 0.04 K/UL — SIGNIFICANT CHANGE UP (ref 0–0.2)
BASOPHILS NFR BLD AUTO: 0.6 % — SIGNIFICANT CHANGE UP (ref 0–2)
BILIRUB SERPL-MCNC: 0.4 MG/DL — SIGNIFICANT CHANGE UP (ref 0.2–1.2)
BUN SERPL-MCNC: 12 MG/DL — SIGNIFICANT CHANGE UP (ref 7–23)
BUN SERPL-MCNC: 26 MG/DL — HIGH (ref 7–23)
CA-I SERPL-SCNC: 1.12 MMOL/L — SIGNIFICANT CHANGE UP (ref 1.12–1.3)
CALCIUM SERPL-MCNC: 10.4 MG/DL — SIGNIFICANT CHANGE UP (ref 8.4–10.5)
CALCIUM SERPL-MCNC: 8.9 MG/DL — SIGNIFICANT CHANGE UP (ref 8.4–10.5)
CHLORIDE BLDV-SCNC: 98 MMOL/L — SIGNIFICANT CHANGE UP (ref 96–108)
CHLORIDE SERPL-SCNC: 89 MMOL/L — LOW (ref 96–108)
CHLORIDE SERPL-SCNC: 95 MMOL/L — LOW (ref 96–108)
CO2 BLDV-SCNC: 31 MMOL/L — HIGH (ref 22–30)
CO2 SERPL-SCNC: 25 MMOL/L — SIGNIFICANT CHANGE UP (ref 22–31)
CO2 SERPL-SCNC: 26 MMOL/L — SIGNIFICANT CHANGE UP (ref 22–31)
CREAT SERPL-MCNC: 3.75 MG/DL — HIGH (ref 0.5–1.3)
CREAT SERPL-MCNC: 6.03 MG/DL — HIGH (ref 0.5–1.3)
CRP SERPL-MCNC: 0.33 MG/DL — SIGNIFICANT CHANGE UP (ref 0–0.4)
EOSINOPHIL # BLD AUTO: 0.15 K/UL — SIGNIFICANT CHANGE UP (ref 0–0.5)
EOSINOPHIL NFR BLD AUTO: 2.2 % — SIGNIFICANT CHANGE UP (ref 0–6)
GAS PNL BLDV: 132 MMOL/L — LOW (ref 135–145)
GAS PNL BLDV: SIGNIFICANT CHANGE UP
GAS PNL BLDV: SIGNIFICANT CHANGE UP
GLUCOSE BLDC GLUCOMTR-MCNC: 118 MG/DL — HIGH (ref 70–99)
GLUCOSE BLDC GLUCOMTR-MCNC: 209 MG/DL — HIGH (ref 70–99)
GLUCOSE BLDC GLUCOMTR-MCNC: 225 MG/DL — HIGH (ref 70–99)
GLUCOSE BLDC GLUCOMTR-MCNC: 274 MG/DL — HIGH (ref 70–99)
GLUCOSE BLDC GLUCOMTR-MCNC: 385 MG/DL — HIGH (ref 70–99)
GLUCOSE BLDC GLUCOMTR-MCNC: 48 MG/DL — LOW (ref 70–99)
GLUCOSE BLDC GLUCOMTR-MCNC: 49 MG/DL — LOW (ref 70–99)
GLUCOSE BLDC GLUCOMTR-MCNC: 54 MG/DL — LOW (ref 70–99)
GLUCOSE BLDC GLUCOMTR-MCNC: 81 MG/DL — SIGNIFICANT CHANGE UP (ref 70–99)
GLUCOSE BLDC GLUCOMTR-MCNC: 94 MG/DL — SIGNIFICANT CHANGE UP (ref 70–99)
GLUCOSE BLDV-MCNC: 214 MG/DL — HIGH (ref 70–99)
GLUCOSE SERPL-MCNC: 194 MG/DL — HIGH (ref 70–99)
GLUCOSE SERPL-MCNC: 254 MG/DL — HIGH (ref 70–99)
HBV CORE AB SER-ACNC: SIGNIFICANT CHANGE UP
HBV SURFACE AB SER-ACNC: <3 MIU/ML — LOW
HBV SURFACE AG SER-ACNC: SIGNIFICANT CHANGE UP
HCO3 BLDV-SCNC: 30 MMOL/L — HIGH (ref 21–29)
HCT VFR BLD CALC: 37.3 % — SIGNIFICANT CHANGE UP (ref 34.5–45)
HCT VFR BLDA CALC: 34 % — LOW (ref 39–50)
HGB BLD CALC-MCNC: 11.2 G/DL — LOW (ref 11.5–15.5)
HGB BLD-MCNC: 11.5 G/DL — SIGNIFICANT CHANGE UP (ref 11.5–15.5)
IMM GRANULOCYTES NFR BLD AUTO: 0.1 % — SIGNIFICANT CHANGE UP (ref 0–1.5)
LACTATE BLDV-MCNC: 2 MMOL/L — SIGNIFICANT CHANGE UP (ref 0.7–2)
LYMPHOCYTES # BLD AUTO: 0.92 K/UL — LOW (ref 1–3.3)
LYMPHOCYTES # BLD AUTO: 13.8 % — SIGNIFICANT CHANGE UP (ref 13–44)
MAGNESIUM SERPL-MCNC: 2.1 MG/DL — SIGNIFICANT CHANGE UP (ref 1.6–2.6)
MCHC RBC-ENTMCNC: 30.8 GM/DL — LOW (ref 32–36)
MCHC RBC-ENTMCNC: 32.5 PG — SIGNIFICANT CHANGE UP (ref 27–34)
MCV RBC AUTO: 105.4 FL — HIGH (ref 80–100)
MONOCYTES # BLD AUTO: 0.95 K/UL — HIGH (ref 0–0.9)
MONOCYTES NFR BLD AUTO: 14.2 % — HIGH (ref 2–14)
NEUTROPHILS # BLD AUTO: 4.6 K/UL — SIGNIFICANT CHANGE UP (ref 1.8–7.4)
NEUTROPHILS NFR BLD AUTO: 69.1 % — SIGNIFICANT CHANGE UP (ref 43–77)
PCO2 BLDV: 53 MMHG — HIGH (ref 35–50)
PH BLDV: 7.36 — SIGNIFICANT CHANGE UP (ref 7.35–7.45)
PHOSPHATE SERPL-MCNC: 5 MG/DL — HIGH (ref 2.5–4.5)
PLATELET # BLD AUTO: 184 K/UL — SIGNIFICANT CHANGE UP (ref 150–400)
PO2 BLDV: 43 MMHG — SIGNIFICANT CHANGE UP (ref 25–45)
POTASSIUM BLDV-SCNC: 7.1 MMOL/L — CRITICAL HIGH (ref 3.5–5.3)
POTASSIUM SERPL-MCNC: 5.3 MMOL/L — SIGNIFICANT CHANGE UP (ref 3.5–5.3)
POTASSIUM SERPL-MCNC: 5.6 MMOL/L — HIGH (ref 3.5–5.3)
POTASSIUM SERPL-SCNC: 5.3 MMOL/L — SIGNIFICANT CHANGE UP (ref 3.5–5.3)
POTASSIUM SERPL-SCNC: 5.6 MMOL/L — HIGH (ref 3.5–5.3)
PROT SERPL-MCNC: 6.8 G/DL — SIGNIFICANT CHANGE UP (ref 6–8.3)
RBC # BLD: 3.54 M/UL — LOW (ref 3.8–5.2)
RBC # FLD: 14.5 % — SIGNIFICANT CHANGE UP (ref 10.3–14.5)
SAO2 % BLDV: 73 % — SIGNIFICANT CHANGE UP (ref 67–88)
SODIUM SERPL-SCNC: 134 MMOL/L — LOW (ref 135–145)
SODIUM SERPL-SCNC: 138 MMOL/L — SIGNIFICANT CHANGE UP (ref 135–145)
WBC # BLD: 6.67 K/UL — SIGNIFICANT CHANGE UP (ref 3.8–10.5)
WBC # FLD AUTO: 6.67 K/UL — SIGNIFICANT CHANGE UP (ref 3.8–10.5)

## 2020-01-03 PROCEDURE — 99223 1ST HOSP IP/OBS HIGH 75: CPT

## 2020-01-03 PROCEDURE — 93923 UPR/LXTR ART STDY 3+ LVLS: CPT | Mod: 26

## 2020-01-03 RX ORDER — INSULIN LISPRO 100/ML
VIAL (ML) SUBCUTANEOUS
Refills: 0 | Status: DISCONTINUED | OUTPATIENT
Start: 2020-01-03 | End: 2020-01-03

## 2020-01-03 RX ORDER — DEXTROSE 50 % IN WATER 50 %
25 SYRINGE (ML) INTRAVENOUS ONCE
Refills: 0 | Status: DISCONTINUED | OUTPATIENT
Start: 2020-01-03 | End: 2020-01-10

## 2020-01-03 RX ORDER — ACETAMINOPHEN 500 MG
650 TABLET ORAL EVERY 6 HOURS
Refills: 0 | Status: DISCONTINUED | OUTPATIENT
Start: 2020-01-03 | End: 2020-01-10

## 2020-01-03 RX ORDER — INSULIN LISPRO 100/ML
6 VIAL (ML) SUBCUTANEOUS
Refills: 0 | Status: DISCONTINUED | OUTPATIENT
Start: 2020-01-03 | End: 2020-01-03

## 2020-01-03 RX ORDER — BACITRACIN ZINC 500 UNIT/G
1 OINTMENT IN PACKET (EA) TOPICAL DAILY
Refills: 0 | Status: DISCONTINUED | OUTPATIENT
Start: 2020-01-03 | End: 2020-01-10

## 2020-01-03 RX ORDER — SODIUM CHLORIDE 9 MG/ML
1000 INJECTION, SOLUTION INTRAVENOUS
Refills: 0 | Status: DISCONTINUED | OUTPATIENT
Start: 2020-01-03 | End: 2020-01-10

## 2020-01-03 RX ORDER — ATORVASTATIN CALCIUM 80 MG/1
20 TABLET, FILM COATED ORAL AT BEDTIME
Refills: 0 | Status: DISCONTINUED | OUTPATIENT
Start: 2020-01-03 | End: 2020-01-10

## 2020-01-03 RX ORDER — INSULIN LISPRO 100/ML
VIAL (ML) SUBCUTANEOUS AT BEDTIME
Refills: 0 | Status: DISCONTINUED | OUTPATIENT
Start: 2020-01-03 | End: 2020-01-10

## 2020-01-03 RX ORDER — INSULIN GLARGINE 100 [IU]/ML
13 INJECTION, SOLUTION SUBCUTANEOUS AT BEDTIME
Refills: 0 | Status: DISCONTINUED | OUTPATIENT
Start: 2020-01-03 | End: 2020-01-09

## 2020-01-03 RX ORDER — DEXTROSE 50 % IN WATER 50 %
25 SYRINGE (ML) INTRAVENOUS ONCE
Refills: 0 | Status: DISCONTINUED | OUTPATIENT
Start: 2020-01-03 | End: 2020-01-03

## 2020-01-03 RX ORDER — HEPARIN SODIUM 5000 [USP'U]/ML
5000 INJECTION INTRAVENOUS; SUBCUTANEOUS EVERY 8 HOURS
Refills: 0 | Status: DISCONTINUED | OUTPATIENT
Start: 2020-01-03 | End: 2020-01-10

## 2020-01-03 RX ORDER — INSULIN LISPRO 100/ML
VIAL (ML) SUBCUTANEOUS AT BEDTIME
Refills: 0 | Status: DISCONTINUED | OUTPATIENT
Start: 2020-01-03 | End: 2020-01-03

## 2020-01-03 RX ORDER — ASPIRIN/CALCIUM CARB/MAGNESIUM 324 MG
81 TABLET ORAL DAILY
Refills: 0 | Status: DISCONTINUED | OUTPATIENT
Start: 2020-01-03 | End: 2020-01-10

## 2020-01-03 RX ORDER — SODIUM CHLORIDE 9 MG/ML
1000 INJECTION, SOLUTION INTRAVENOUS
Refills: 0 | Status: DISCONTINUED | OUTPATIENT
Start: 2020-01-03 | End: 2020-01-03

## 2020-01-03 RX ORDER — HYDRALAZINE HCL 50 MG
50 TABLET ORAL EVERY 8 HOURS
Refills: 0 | Status: DISCONTINUED | OUTPATIENT
Start: 2020-01-03 | End: 2020-01-10

## 2020-01-03 RX ORDER — SEVELAMER CARBONATE 2400 MG/1
2400 POWDER, FOR SUSPENSION ORAL THREE TIMES A DAY
Refills: 0 | Status: DISCONTINUED | OUTPATIENT
Start: 2020-01-03 | End: 2020-01-10

## 2020-01-03 RX ORDER — DEXTROSE 50 % IN WATER 50 %
15 SYRINGE (ML) INTRAVENOUS ONCE
Refills: 0 | Status: DISCONTINUED | OUTPATIENT
Start: 2020-01-03 | End: 2020-01-10

## 2020-01-03 RX ORDER — INSULIN GLARGINE 100 [IU]/ML
6 INJECTION, SOLUTION SUBCUTANEOUS ONCE
Refills: 0 | Status: COMPLETED | OUTPATIENT
Start: 2020-01-03 | End: 2020-01-03

## 2020-01-03 RX ORDER — ALBUTEROL 90 UG/1
2 AEROSOL, METERED ORAL EVERY 6 HOURS
Refills: 0 | Status: DISCONTINUED | OUTPATIENT
Start: 2020-01-03 | End: 2020-01-10

## 2020-01-03 RX ORDER — OXYCODONE AND ACETAMINOPHEN 5; 325 MG/1; MG/1
1 TABLET ORAL ONCE
Refills: 0 | Status: DISCONTINUED | OUTPATIENT
Start: 2020-01-03 | End: 2020-01-03

## 2020-01-03 RX ORDER — GLUCAGON INJECTION, SOLUTION 0.5 MG/.1ML
1 INJECTION, SOLUTION SUBCUTANEOUS ONCE
Refills: 0 | Status: DISCONTINUED | OUTPATIENT
Start: 2020-01-03 | End: 2020-01-10

## 2020-01-03 RX ORDER — CLOPIDOGREL BISULFATE 75 MG/1
75 TABLET, FILM COATED ORAL DAILY
Refills: 0 | Status: DISCONTINUED | OUTPATIENT
Start: 2020-01-03 | End: 2020-01-10

## 2020-01-03 RX ORDER — DEXTROSE 50 % IN WATER 50 %
12.5 SYRINGE (ML) INTRAVENOUS ONCE
Refills: 0 | Status: DISCONTINUED | OUTPATIENT
Start: 2020-01-03 | End: 2020-01-03

## 2020-01-03 RX ORDER — DEXTROSE 50 % IN WATER 50 %
12.5 SYRINGE (ML) INTRAVENOUS ONCE
Refills: 0 | Status: DISCONTINUED | OUTPATIENT
Start: 2020-01-03 | End: 2020-01-10

## 2020-01-03 RX ORDER — BUDESONIDE, MICRONIZED 100 %
0.5 POWDER (GRAM) MISCELLANEOUS
Refills: 0 | Status: DISCONTINUED | OUTPATIENT
Start: 2020-01-03 | End: 2020-01-10

## 2020-01-03 RX ORDER — PANTOPRAZOLE SODIUM 20 MG/1
40 TABLET, DELAYED RELEASE ORAL
Refills: 0 | Status: DISCONTINUED | OUTPATIENT
Start: 2020-01-03 | End: 2020-01-10

## 2020-01-03 RX ORDER — DEXTROSE 50 % IN WATER 50 %
15 SYRINGE (ML) INTRAVENOUS ONCE
Refills: 0 | Status: DISCONTINUED | OUTPATIENT
Start: 2020-01-03 | End: 2020-01-03

## 2020-01-03 RX ORDER — INSULIN LISPRO 100/ML
3 VIAL (ML) SUBCUTANEOUS
Refills: 0 | Status: DISCONTINUED | OUTPATIENT
Start: 2020-01-03 | End: 2020-01-05

## 2020-01-03 RX ORDER — INSULIN LISPRO 100/ML
VIAL (ML) SUBCUTANEOUS
Refills: 0 | Status: DISCONTINUED | OUTPATIENT
Start: 2020-01-03 | End: 2020-01-10

## 2020-01-03 RX ORDER — DEXTROSE 50 % IN WATER 50 %
15 SYRINGE (ML) INTRAVENOUS ONCE
Refills: 0 | Status: COMPLETED | OUTPATIENT
Start: 2020-01-03 | End: 2020-01-03

## 2020-01-03 RX ORDER — GLUCAGON INJECTION, SOLUTION 0.5 MG/.1ML
1 INJECTION, SOLUTION SUBCUTANEOUS ONCE
Refills: 0 | Status: DISCONTINUED | OUTPATIENT
Start: 2020-01-03 | End: 2020-01-03

## 2020-01-03 RX ADMIN — Medication 15 GRAM(S): at 17:07

## 2020-01-03 RX ADMIN — Medication 650 MILLIGRAM(S): at 11:24

## 2020-01-03 RX ADMIN — OXYCODONE AND ACETAMINOPHEN 1 TABLET(S): 5; 325 TABLET ORAL at 19:03

## 2020-01-03 RX ADMIN — INSULIN GLARGINE 6 UNIT(S): 100 INJECTION, SOLUTION SUBCUTANEOUS at 05:57

## 2020-01-03 RX ADMIN — SEVELAMER CARBONATE 2400 MILLIGRAM(S): 2400 POWDER, FOR SUSPENSION ORAL at 11:24

## 2020-01-03 RX ADMIN — INSULIN GLARGINE 13 UNIT(S): 100 INJECTION, SOLUTION SUBCUTANEOUS at 21:44

## 2020-01-03 RX ADMIN — Medication 50 MILLIGRAM(S): at 21:44

## 2020-01-03 RX ADMIN — Medication 650 MILLIGRAM(S): at 11:54

## 2020-01-03 RX ADMIN — SEVELAMER CARBONATE 2400 MILLIGRAM(S): 2400 POWDER, FOR SUSPENSION ORAL at 21:44

## 2020-01-03 RX ADMIN — Medication 81 MILLIGRAM(S): at 11:24

## 2020-01-03 RX ADMIN — ALBUTEROL 2 PUFF(S): 90 AEROSOL, METERED ORAL at 06:03

## 2020-01-03 RX ADMIN — SEVELAMER CARBONATE 2400 MILLIGRAM(S): 2400 POWDER, FOR SUSPENSION ORAL at 06:03

## 2020-01-03 RX ADMIN — Medication 1 APPLICATION(S): at 11:25

## 2020-01-03 RX ADMIN — Medication 3: at 21:45

## 2020-01-03 RX ADMIN — Medication 3: at 09:07

## 2020-01-03 RX ADMIN — OXYCODONE AND ACETAMINOPHEN 1 TABLET(S): 5; 325 TABLET ORAL at 18:08

## 2020-01-03 RX ADMIN — Medication 6 UNIT(S): at 09:06

## 2020-01-03 RX ADMIN — Medication 0.5 MILLIGRAM(S): at 06:03

## 2020-01-03 RX ADMIN — Medication 6 UNIT(S): at 12:59

## 2020-01-03 RX ADMIN — Medication 50 MILLIGRAM(S): at 11:24

## 2020-01-03 RX ADMIN — ALBUTEROL 2 PUFF(S): 90 AEROSOL, METERED ORAL at 18:07

## 2020-01-03 RX ADMIN — CLOPIDOGREL BISULFATE 75 MILLIGRAM(S): 75 TABLET, FILM COATED ORAL at 11:24

## 2020-01-03 RX ADMIN — PANTOPRAZOLE SODIUM 40 MILLIGRAM(S): 20 TABLET, DELAYED RELEASE ORAL at 06:03

## 2020-01-03 RX ADMIN — Medication 0.5 MILLIGRAM(S): at 18:07

## 2020-01-03 RX ADMIN — ATORVASTATIN CALCIUM 20 MILLIGRAM(S): 80 TABLET, FILM COATED ORAL at 21:44

## 2020-01-03 RX ADMIN — ALBUTEROL 2 PUFF(S): 90 AEROSOL, METERED ORAL at 11:24

## 2020-01-03 NOTE — H&P ADULT - PROBLEM SELECTOR PLAN 1
in patient with ESRD despite HD  - nephrology consulted by ED and patient receiving HD at time of admit interview exam.  - received regular insulin in ED and dosed lantus 6U per orders  - f/u BMP in am to monitor, trending down on 2nd bmp  - hold lisinopril

## 2020-01-03 NOTE — H&P ADULT - NSHPREVIEWOFSYSTEMS_GEN_ALL_CORE
CONSTITUTIONAL: No fever, no chills  EYES: No eye pain, no visual disturbance  Mouth: no pain in mouth, no cuts  RESPIRATORY: No cough, No sob  CARDIOVASCULAR: No CP, no palpitations  GASTROINTESTINAL: no abdominal pain, no n/v/d  GENITOURINARY: No dysuria, no hematuria  Heme: No easy bruising, no swelling appreciated in neck/armpit/groin  NEUROLOGICAL: No headaches, no paralysis  SKIN: No itching, toenail fell off of left toe  MUSCULOSKELETAL: left toe pain and left toe swelling

## 2020-01-03 NOTE — H&P ADULT - NSHPLABSRESULTS_GEN_ALL_CORE
Personally reviewed available labs, imaging and ekg  CBC Full  -  ( 02 Jan 2020 20:30 )  WBC Count : 5.24 K/uL  RBC Count : 4.10 M/uL  Hemoglobin : 13.0 g/dL  Hematocrit : 43.1 %  Platelet Count - Automated : 209 K/uL  Mean Cell Volume : 105.1 fl  Mean Cell Hemoglobin : 31.7 pg  Mean Cell Hemoglobin Concentration : 30.2 gm/dL  Auto Neutrophil # : 3.20 K/uL  Auto Lymphocyte # : 1.16 K/uL  Auto Monocyte # : 0.70 K/uL  Auto Eosinophil # : 0.13 K/uL  Auto Basophil # : 0.04 K/uL  Auto Neutrophil % : 61.0 %  Auto Lymphocyte % : 22.1 %  Auto Monocyte % : 13.4 %  Auto Eosinophil % : 2.5 %  Auto Basophil % : 0.8 %  138  |  95<L>  |  26<H>  ----------------------------<  194<H>  5.6<H>   |  26  |  6.03<H>  Ca    8.9      03 Jan 2020 01:17  TPro  7.9  /  Alb  4.5  /  TBili  0.3  /  DBili  x   /  AST  20  /  ALT  7<L>  /  AlkPhos  153<H>  01-02  PT/INR - ( 02 Jan 2020 20:30 )   PT: 11.2 sec;   INR: 0.97 ratio    PTT - ( 02 Jan 2020 20:30 )  PTT:32.5 sec  POCT Blood Glucose.: 305 mg/dL (02 Jan 2020 22:33)  POCT Blood Glucose.: 360 mg/dL (02 Jan 2020 21:30)  POCT Blood Glucose.: 297 mg/dL (02 Jan 2020 20:47)  00:32 - VBG - pH: 7.36  | pCO2: 53    | pO2: 43    | Lactate: 2.0    21:48 - VBG - pH: 7.37  | pCO2: 52    | pO2: 28    | Lactate: 2.2    Sedimentation Rate, Erythrocyte: 23 mm/hr (01.02.20 @ 20:30)    Imaging  xray of left foot w/ fracture present  < from: Xray Foot AP + Lateral + Oblique, Left (01.02.20 @ 22:16) >  ******PRELIMINARY REPORT******        INTERPRETATION:  Acute fracture involving the left fourth proximal phalangeal shaft. Cortical irregularity of the middle second phalanx with questionable lucency along the medial aspect, which likely represents an acute fracture but may be artifactual. Correlate with point tenderness.  < end of copied text >  CXR w/ cardiomegaly, does not appear to be significantly changed from previous cxr in Dec2012  EKG NSR 71 w/ TWI III,aVL no STEMI appreciated

## 2020-01-03 NOTE — H&P ADULT - PROBLEM SELECTOR PLAN 8
c/w asa, clopidogrel, statin  holding metoprolol overnight given relative hypotension to with plan to likely restart during the day  hold ac-i given hyperkalemia

## 2020-01-03 NOTE — H&P ADULT - PROBLEM SELECTOR PLAN 9
Transitions of Care Status:  1.  Name of PCP: Dr. Castro  2.  PCP Contacted on Admission: [ ] Y    [X ] N    3.  PCP contacted at Discharge: [ ] Y    [ ] N    [ ] N/A  4.  Post-Discharge Appointment Date and Location:  5.  Summary of Handoff given to PCP:

## 2020-01-03 NOTE — ED ADULT NURSE REASSESSMENT NOTE - NS ED NURSE REASSESS COMMENT FT1
Report received from Dialysis RN Byron, pt had 3 hour dialysis, 1L fluid removed. Awaits pt return to ED.

## 2020-01-03 NOTE — H&P ADULT - NSHPPHYSICALEXAM_GEN_ALL_CORE
Vital Signs Last 24 Hrs  T(C): 36.2 (03 Jan 2020 00:50), Max: 37 (02 Jan 2020 21:30)  T(F): 97.2 (03 Jan 2020 00:50), Max: 98.6 (02 Jan 2020 21:30)  HR: 68 (03 Jan 2020 00:50) (65 - 71)  BP: 111/61 (03 Jan 2020 00:50) (106/42 - 148/63)  RR: 18 (03 Jan 2020 00:50) (15 - 20)  SpO2: 97% (03 Jan 2020 00:50) (97% - 100%) on NC    GENERAL: NAD, well-developed, nontoxic appearing  HEAD:  Atraumatic, Normocephalic  EYES: EOMI, PERRLA, conjunctiva and sclera clear  Mouth: MMM, no lesions  NECK: Supple, no appreciable masses  Lung: normal work of breathing, crackles in left lung base  Chest: S1&S2+, rrr, no m/r/g appreciated  ABDOMEN: bs+, soft, nt, nd, no appreciable masses  : No irene catheter, no CVA tenderness  EXTREMITIES:  radial pulse present b/l, no pitting edema present, left foot bandaged and pain to light palpation of left 2nd digit  Neuro: A&Ox3, no paralysis in extremities appreciated  SKIN: warm and dry, no visible rashes

## 2020-01-03 NOTE — H&P ADULT - PROBLEM SELECTOR PLAN 7
HOLD lisinopril given hyperkalemia  hold metoprolol given recorded relative hypotension overnight to have reinitiation to be reevaluated by day hospitalist  will continue with hydralazine 50 TID per nephrology with holding parameters  on exam is lying flat w/o CP and therefore does not appear to be in acute heart faiure

## 2020-01-03 NOTE — H&P ADULT - HISTORY OF PRESENT ILLNESS
64F w/ ESRD on HD(M/T/Th/Sa), CAD s/p multiple stents/brachytherapy, mod MR, severe AS, RHF, CHF (EF 30% per last [facility]), DM1 c/b neuropathy and retinopathy, hx of TIA, severe peripheral artery disease s/p b/l fempop bypass c/b left thrombosed graft, left subclavian vein stenosis s/p stent, COPD not on O2, gout, hilar lymphadenopathy of unknown etiology, recent hospitalization for pneumonia d/c 12/9/19 presents with left toe pain. Patient reports developing the pain today and denies fall or trauma to the toe. She reports some discoloration (darkening) but no purulence. The toe nail over left digit of left foot reportedly falling off but severe not until today. No fever, chills, cp, sob, cough, n/v/d, painful urination, or skin change other than over left toe. She reports completing session of HD on day of presentation.    In the ED, VS 98, 145/79->106/42, 70, 20 98%RA->17 100% on 2L  s/p insulin regular 5U, morphine 4mg IVx1, zosyn x1

## 2020-01-03 NOTE — CHART NOTE - NSCHARTNOTEFT_GEN_A_CORE
Reported Blood sugar of 48 predinner today - pt Asymptomatic      POCT Blood Glucose.: 118 mg/dL (03 Jan 2020 18:01)  POCT Blood Glucose.: 81 mg/dL (03 Jan 2020 17:42)  POCT Blood Glucose.: 54 mg/dL (03 Jan 2020 17:21)  POCT Blood Glucose.: 49 mg/dL (03 Jan 2020 17:02)  POCT Blood Glucose.: 48 mg/dL (03 Jan 2020 16:59)  POCT Blood Glucose.: 94 mg/dL (03 Jan 2020 12:19)  POCT Blood Glucose.: 274 mg/dL (03 Jan 2020 08:30)    Followed hypoglycemia protocol  Decreased premeal insulin by 50% - to 3 units premeal and will hold predinner Humalog today    [id number]

## 2020-01-03 NOTE — H&P ADULT - PROBLEM SELECTOR PLAN 3
hx of COPD not on home Oxygen  - recent admit Dec2019 w/ multifocal pna. placed on O2 in ED however unclear if truly required as patient sating above goal of SpO2 98-92 and did not require it initially and with no complaint of sob/cough. titrate of oxygen following HD. if not able to be weaned will need repeat CT chest and per day hospitalist to call pulmonology consult. given no leukocytosis and nontoxic exam do not suspect infectious etiology and therefore will hold antibiotics.

## 2020-01-03 NOTE — H&P ADULT - PROBLEM SELECTOR PLAN 6
endocrinology per day hospitalist  continue w/ home regimen for now lantus 13U and humalog 6U TID premeal w/ ISS

## 2020-01-03 NOTE — H&P ADULT - PROBLEM SELECTOR PLAN 2
podiatry evaluation evaluated  - noted 2nd toe nail avulsion as well. per podiatry c/w bacitracin  - will need day hospitalist to f/u attending recommendation and final read of left foot xray  - 2nd and 4th digit appear to have fracture  - given no leukocytosis and ESR on admit would monitor off antibiotics unless indicated by podiatry

## 2020-01-03 NOTE — H&P ADULT - ASSESSMENT
64F w/ ESRD on HD(M/T/Th/Sa), CAD s/p multiple stents/brachytherapy, mod MR, severe AS, RHF, CHF (EF 30% per last [facility]), DM1 c/b neuropathy and retinopathy, hx of TIA, severe peripheral artery disease s/p b/l fempop bypass c/b left thrombosed graft, left subclavian vein stenosis s/p stent, COPD not on O2, gout, hilar lymphadenopathy of unknown etiology, recent hospitalization for pneumonia d/c 12/9/19 presents with left toe pain and xray appearing c/w Left 2nd and 4th toe fracture incidentally found to have hyperkalemia despite HD on day of admit and therefore admitted for urgent HD w/ podiatry eval of left foot.

## 2020-01-03 NOTE — CONSULT NOTE ADULT - ASSESSMENT
64F w/ extensive PMHx w/ c/c of left foot 2nd digit pain w/ progressive swelling  - Pt seen and evaluated  - Vitals stable, no leukocytosis, ESR 23  -  left foot 2nd digit s/p traumatic nail avulsion with dorsal nail bed wound, 2.0x2.0cm, depth to subq, wound bed >80% fibrotic, no active drainage, no malodor, etiology 2/2 trauma; left foot 2nd digit dactylitis, pain on light palpation of left distal forefoot  - Splinted digits & Dressed wound w/ wet to dry DSD  - Ordered bacitracin to be applied to wound during dressing changes  - L foot x-rays reviewed and discussed w/ pt demonstrating acute fracture of left 4th proximal phalanx shaft & cortical irregularity of 2nd middle phalanx highly suspicious of fracture  - Ordered KATI/PVR  - No acute surgical intervention  - Podiatry will continue to follow  - d/w attending

## 2020-01-03 NOTE — H&P ADULT - ATTENDING COMMENTS
Patient assigned to me by night hospitalist in charge for management and care for patient for this evening only. Care to be resumed by day hospitalist Dr. Viera at 08:00 in the morning and thereafter.

## 2020-01-03 NOTE — PROGRESS NOTE ADULT - SUBJECTIVE AND OBJECTIVE BOX
[facility]   [phone]  RENAL FOLLOW UP NOTE  --------------------------------------------------------------------------------  Chief Complaint:    24 hour events/subjective:    seen earlier. states does not recall if had high [employer] foods PTA  has pain in left toe still   no sob     PAST HISTORY  --------------------------------------------------------------------------------  No significant changes to PMH, PSH, FHx, SHx, unless otherwise noted    ALLERGIES & MEDICATIONS  --------------------------------------------------------------------------------  Allergies    No Known Allergies    Intolerances      Standing Inpatient Medications  ALBUTerol    90 MICROgram(s) HFA Inhaler 2 Puff(s) Inhalation every 6 hours  aspirin enteric coated 81 milliGRAM(s) Oral daily  atorvastatin 20 milliGRAM(s) Oral at bedtime  BACItracin   Ointment 1 Application(s) Topical daily  buDESOnide    Inhalation Suspension 0.5 milliGRAM(s) Inhalation two times a day  clopidogrel Tablet 75 milliGRAM(s) Oral daily  dextrose 5%. 1000 milliLiter(s) IV Continuous <Continuous>  dextrose 50% Injectable 12.5 Gram(s) IV Push once  dextrose 50% Injectable 25 Gram(s) IV Push once  dextrose 50% Injectable 25 Gram(s) IV Push once  heparin  Injectable 5000 Unit(s) SubCutaneous every 8 hours  hydrALAZINE 50 milliGRAM(s) Oral every 8 hours  insulin glargine Injectable (LANTUS) 13 Unit(s) SubCutaneous at bedtime  insulin lispro (HumaLOG) corrective regimen sliding scale   SubCutaneous three times a day before meals  insulin lispro (HumaLOG) corrective regimen sliding scale   SubCutaneous at bedtime  insulin lispro Injectable (HumaLOG) 3 Unit(s) SubCutaneous three times a day before meals  pantoprazole    Tablet 40 milliGRAM(s) Oral before breakfast  sevelamer carbonate 2400 milliGRAM(s) Oral three times a day    PRN Inpatient Medications  acetaminophen   Tablet .. 650 milliGRAM(s) Oral every 6 hours PRN  dextrose 40% Gel 15 Gram(s) Oral once PRN  glucagon  Injectable 1 milliGRAM(s) IntraMuscular once PRN      REVIEW OF SYSTEMS  --------------------------------------------------------------------------------    Gen: denies  fevers/chills,  CVS: denies chest pain/palpitations  Resp: denies SOB/Cough  GI: Denies N/V/Abd pain  : Denies dysuria    All other systems were reviewed and are negative, except as noted.    VITALS/PHYSICAL EXAM  --------------------------------------------------------------------------------  T(C): 36.4 (01-03-20 @ 14:00), Max: 37.2 (01-03-20 @ 05:12)  HR: 78 (01-03-20 @ 14:00) (65 - 82)  BP: 116/66 (01-03-20 @ 14:00) (100/62 - 148/63)  RR: 18 (01-03-20 @ 14:00) (15 - 18)  SpO2: 100% (01-03-20 @ 14:00) (96% - 100%)  Wt(kg): --  Height (cm): 162.6 (01-03-20 @ 14:00)  Weight (kg): 57.7 (01-03-20 @ 14:00)  BMI (kg/m2): 21.8 (01-03-20 @ 14:00)  BSA (m2): 1.61 (01-03-20 @ 14:00)      01-02-20 @ 07:01  -  01-03-20 @ 07:00  --------------------------------------------------------  IN: 1100 mL / OUT: 2100 mL / NET: -1000 mL    01-03-20 @ 07:01  -  01-03-20 @ 19:57  --------------------------------------------------------  IN: 420 mL / OUT: 0 mL / NET: 420     Physical Exam:  	  Gen: Non toxic but in pain from left 2nd toe   	no jvd ,  	Pulm: decrease bs  no rales or ronchi or wheezing  	CV: RRR, S1S2; no rub  	Abd: +BS, soft, nontender/nondistended  	: No suprapubic tenderness  	UE: Warm, no cyanosis  no clubbing,  no edema  	LE: Warm, no cyanosis  no clubbing, LLE 1-2-  edema  	Neuro: alert and oriented. speech coherent   	Skin: Warm, no decrease skin turgor  left 2nd toe mild erythema and discoloration with missing nail as well   	Vascular access: LUE + AVF + thrill and bruit     	    LABS/STUDIES  --------------------------------------------------------------------------------              11.5   6.67  >-----------<  184      [01-03-20 @ 08:40]              37.3     134  |  89  |  12  ----------------------------<  254      [01-03-20 @ 06:34]  5.3   |  25  |  3.75        Ca     10.4     [01-03-20 @ 06:34]      Mg     2.1     [01-03-20 @ 06:34]      Phos  5.0     [01-03-20 @ 06:34]    TPro  6.8  /  Alb  3.9  /  TBili  0.4  /  DBili  x   /  AST  11  /  ALT  10  /  AlkPhos  135  [01-03-20 @ 06:34]    PT/INR: PT 11.2 , INR 0.97       [01-02-20 @ 20:30]  PTT: 32.5       [01-02-20 @ 20:30]      Creatinine Trend:  SCr 3.75 [01-03 @ 06:34]  SCr 6.03 [01-03 @ 01:17]  SCr 5.95 [01-02 @ 20:30]  SCr 5.52 [12-09 @ 16:17]  SCr 3.91 [12-08 @ 06:52]                  Iron 67, TIBC 234, %sat 29      [12-04-19 @ 08:38]  Ferritin 1021      [12-04-19 @ 08:40]  PTH -- (Ca 8.3)      [12-04-19 @ 08:38]   952  PTH -- (Ca 9.6)      [06-17-19 @ 18:06]   177  PTH -- (Ca 7.8)      [03-29-19 @ 20:38]   73  PTH -- (Ca 7.3)      [02-22-19 @ 02:49]   59  HbA1c 8.7      [11-13-19 @ 23:44]  TSH 1.78      [11-14-19 @ 09:15]  Lipid: chol 108, TG 76, HDL 57, LDL 36      [11-14-19 @ 09:16]

## 2020-01-03 NOTE — PROGRESS NOTE ADULT - ASSESSMENT
65 y/o F  with PMHx of CHF, CAD, DMT1 on insulin, ACS, TIA, CVA, gout, PVD, ESRD on dialysis with multiple admissions  presents today with loss of left 2nd toenail with severe pain four days ago.  Pain gradually worsening. Reports that she woke up with her nail hanging off, has pain on her left 2nd toe. in er noticed with hyperglycemia as well as hyperkalemia with k 6.7 on admission     1- esrd  2- hyperkalemia  3- HTN   4- shpt   5- cad  6- DM     hd am k is better after dialysis  low k diet  discussed with pt as suspect pt with likely non adherence   insulin as per endo recommendations   renvela 2400 mg tid   hydralazine 50 mg tid

## 2020-01-03 NOTE — ED ADULT NURSE REASSESSMENT NOTE - NS ED NURSE REASSESS COMMENT FT1
Report given to KRISTINE Lopez in Dialysis, pt , pt has humulin R 4 units IV push ordered for hyperkalemia, pt to dialysis at this time, per MD Rincon, pt does not need this dose at this time.

## 2020-01-03 NOTE — CONSULT NOTE ADULT - SUBJECTIVE AND OBJECTIVE BOX
Patient is a 64y old  Female who presents with a chief complaint of left foot 2nd digit pain w/ progressive swelling.    HPI: 65 y/o F  with PMHx of CHF, CAD, DMT1 on insulin, ACS, TIA, CVA, gout, PVD, ESRD on dialysis presents today with loss of left 2nd toenail with severe pain four days ago.  Pain gradually worsening. Reports that she woke up with her nail hanging off, toe now turning black.  Bandaged by EMS, never seen podiatry in past.  No noted trauma. Took Percocet and Oxycodone.      PAST MEDICAL & SURGICAL HISTORY:  COPD (chronic obstructive pulmonary disease)  Localized enlarged lymph nodes  CHF (congestive heart failure): EF 40-45%  Subclavian vein stenosis, left: s/p stent  DKA, type 1: 1/2015  ACS (acute coronary syndrome): 1/2015 - cath revealed 100% ostial stenosis not amenable to PCI - medical management  TIA (transient ischemic attack): x 2 - 8-9 years ago prior to ASD/VSD repair  CAD (coronary artery disease): s/p stents  Gout: past  CVA (cerebral infarction): with no residual, 8 yrs ago, prior to heart surgery - ST memory loss  Peripheral vascular disease: occluded left fem-pop bypass 5/2015  Diabetes mellitus type 1: Insulin Dependent -  ESRD (end stage renal disease): dialysis  M, tue, thursday, saturday  Hyperlipidemia  Status post device closure of ASD: &quot;clamshell&quot;  History of cardiac catheterization: 1/2015 - no intervention  S/P femoral-popliteal bypass surgery: L and R in 2013 with graft; 5/2015 CFA angioplasty left and ileofemoral endarterectomywith vein patch angioplasty of left fem-pop bypass graft  Multiple vascular surgery both leg, left fempop bypass revision 11/2015  AV (arteriovenous fistula): Left AV graft; revision with stent placement 2-3 years ago  S/P cholecystectomy      MEDICATIONS  (STANDING):  dextrose 5%. 1000 milliLiter(s) (50 mL/Hr) IV Continuous <Continuous>  dextrose 50% Injectable 12.5 Gram(s) IV Push once  dextrose 50% Injectable 25 Gram(s) IV Push once  dextrose 50% Injectable 25 Gram(s) IV Push once  insulin lispro (HumaLOG) corrective regimen sliding scale   SubCutaneous three times a day before meals  insulin lispro (HumaLOG) corrective regimen sliding scale   SubCutaneous at bedtime    MEDICATIONS  (PRN):  dextrose 40% Gel 15 Gram(s) Oral once PRN Blood Glucose LESS THAN 70 milliGRAM(s)/deciliter  glucagon  Injectable 1 milliGRAM(s) IntraMuscular once PRN Glucose LESS THAN 70 milligrams/deciliter      Allergies    No Known Allergies    Intolerances        VITALS:    Vital Signs Last 24 Hrs  T(C): 36.2 (03 Jan 2020 00:50), Max: 37 (02 Jan 2020 21:30)  T(F): 97.2 (03 Jan 2020 00:50), Max: 98.6 (02 Jan 2020 21:30)  HR: 68 (03 Jan 2020 00:50) (65 - 71)  BP: 111/61 (03 Jan 2020 00:50) (106/42 - 148/63)  BP(mean): --  RR: 18 (03 Jan 2020 00:50) (15 - 20)  SpO2: 97% (03 Jan 2020 00:50) (97% - 100%)    LABS:                          13.0   5.24  )-----------( 209      ( 02 Jan 2020 20:30 )             43.1       01-02    134<L>  |  92<L>  |  25<H>  ----------------------------<  356<H>  6.7<HH>   |  25  |  5.95<H>    Ca    10.0      02 Jan 2020 20:30    TPro  7.9  /  Alb  4.5  /  TBili  0.3  /  DBili  x   /  AST  20  /  ALT  7<L>  /  AlkPhos  153<H>  01-02      CAPILLARY BLOOD GLUCOSE      POCT Blood Glucose.: 305 mg/dL (02 Jan 2020 22:33)  POCT Blood Glucose.: 360 mg/dL (02 Jan 2020 21:30)  POCT Blood Glucose.: 297 mg/dL (02 Jan 2020 20:47)      PT/INR - ( 02 Jan 2020 20:30 )   PT: 11.2 sec;   INR: 0.97 ratio         PTT - ( 02 Jan 2020 20:30 )  PTT:32.5 sec    LOWER EXTREMITY PHYSICAL EXAM:    Vascular: DP/PT non-palpable, B/L, CFT <3 seconds B/L, Temperature gradient warm to cool, B/L.   Neuro: Epicritic sensation absent to the level of the forefoot B/L.  Musculoskeletal/Ortho: left foot 2nd digit dactylitis, pain on light palpation of left distal forefoot  Skin: left foot 2nd digit s/p traumatic nail avulsion with dorsal nail bed wound, 2.0x2.0cm, depth to subq, wound bed >80% fibrotic, no active drainage, no malodor, etiology 2/2 trauma      RADIOLOGY & ADDITIONAL STUDIES:  < from: Xray Foot AP + Lateral + Oblique, Left (01.02.20 @ 22:16) >  ******PRELIMINARY REPORT******    ******PRELIMINARY REPORT******          EXAM:  FOOT COMPLETE LEFT (MIN 3 VIEWS)                            PROCEDURE DATE:  01/02/2020      ******PRELIMINARY REPORT******    ******PRELIMINARY REPORT******              INTERPRETATION:  Acute fracture involving the left fourth proximal phalangeal shaft. Cortical irregularity of the middle second phalanx with questionable lucency along the medial aspect, which likely represents an acute fracture but may be artifactual. Correlate with point tenderness.              ******PRELIMINARY REPORT******    ******PRELIMINARY REPORT******          HOLLEY KIRBY M.D., RADIOLOGY RESIDENT    < end of copied text >

## 2020-01-04 LAB
ANION GAP SERPL CALC-SCNC: 13 MMOL/L — SIGNIFICANT CHANGE UP (ref 5–17)
BUN SERPL-MCNC: 27 MG/DL — HIGH (ref 7–23)
BUN SERPL-MCNC: 31 MG/DL — HIGH (ref 7–23)
BUN SERPL-MCNC: 7 MG/DL — SIGNIFICANT CHANGE UP (ref 7–23)
CALCIUM SERPL-MCNC: 9.3 MG/DL — SIGNIFICANT CHANGE UP (ref 8.4–10.5)
CHLORIDE SERPL-SCNC: 88 MMOL/L — LOW (ref 96–108)
CO2 SERPL-SCNC: 28 MMOL/L — SIGNIFICANT CHANGE UP (ref 22–31)
CREAT SERPL-MCNC: 6.03 MG/DL — HIGH (ref 0.5–1.3)
GLUCOSE BLDC GLUCOMTR-MCNC: 160 MG/DL — HIGH (ref 70–99)
GLUCOSE BLDC GLUCOMTR-MCNC: 161 MG/DL — HIGH (ref 70–99)
GLUCOSE BLDC GLUCOMTR-MCNC: 195 MG/DL — HIGH (ref 70–99)
GLUCOSE BLDC GLUCOMTR-MCNC: 249 MG/DL — HIGH (ref 70–99)
GLUCOSE BLDC GLUCOMTR-MCNC: 258 MG/DL — HIGH (ref 70–99)
GLUCOSE SERPL-MCNC: 250 MG/DL — HIGH (ref 70–99)
HCT VFR BLD CALC: 36.3 % — SIGNIFICANT CHANGE UP (ref 34.5–45)
HGB BLD-MCNC: 11.2 G/DL — LOW (ref 11.5–15.5)
MAGNESIUM SERPL-MCNC: 2.5 MG/DL — SIGNIFICANT CHANGE UP (ref 1.6–2.6)
MCHC RBC-ENTMCNC: 30.9 GM/DL — LOW (ref 32–36)
MCHC RBC-ENTMCNC: 32.2 PG — SIGNIFICANT CHANGE UP (ref 27–34)
MCV RBC AUTO: 104.3 FL — HIGH (ref 80–100)
PHOSPHATE SERPL-MCNC: 5.7 MG/DL — HIGH (ref 2.5–4.5)
PLATELET # BLD AUTO: 187 K/UL — SIGNIFICANT CHANGE UP (ref 150–400)
POTASSIUM SERPL-MCNC: 5.7 MMOL/L — HIGH (ref 3.5–5.3)
POTASSIUM SERPL-SCNC: 5.7 MMOL/L — HIGH (ref 3.5–5.3)
RBC # BLD: 3.48 M/UL — LOW (ref 3.8–5.2)
RBC # FLD: 14.4 % — SIGNIFICANT CHANGE UP (ref 10.3–14.5)
SODIUM SERPL-SCNC: 129 MMOL/L — LOW (ref 135–145)
WBC # BLD: 4.69 K/UL — SIGNIFICANT CHANGE UP (ref 3.8–10.5)
WBC # FLD AUTO: 4.69 K/UL — SIGNIFICANT CHANGE UP (ref 3.8–10.5)

## 2020-01-04 RX ORDER — OXYCODONE AND ACETAMINOPHEN 5; 325 MG/1; MG/1
1 TABLET ORAL ONCE
Refills: 0 | Status: DISCONTINUED | OUTPATIENT
Start: 2020-01-04 | End: 2020-01-04

## 2020-01-04 RX ORDER — METOPROLOL TARTRATE 50 MG
50 TABLET ORAL DAILY
Refills: 0 | Status: DISCONTINUED | OUTPATIENT
Start: 2020-01-04 | End: 2020-01-10

## 2020-01-04 RX ADMIN — ALBUTEROL 2 PUFF(S): 90 AEROSOL, METERED ORAL at 23:06

## 2020-01-04 RX ADMIN — Medication 3 UNIT(S): at 12:12

## 2020-01-04 RX ADMIN — PANTOPRAZOLE SODIUM 40 MILLIGRAM(S): 20 TABLET, DELAYED RELEASE ORAL at 05:17

## 2020-01-04 RX ADMIN — Medication 81 MILLIGRAM(S): at 12:13

## 2020-01-04 RX ADMIN — Medication 1: at 22:34

## 2020-01-04 RX ADMIN — Medication 1 APPLICATION(S): at 22:03

## 2020-01-04 RX ADMIN — SEVELAMER CARBONATE 2400 MILLIGRAM(S): 2400 POWDER, FOR SUSPENSION ORAL at 05:15

## 2020-01-04 RX ADMIN — CLOPIDOGREL BISULFATE 75 MILLIGRAM(S): 75 TABLET, FILM COATED ORAL at 12:13

## 2020-01-04 RX ADMIN — Medication 1: at 08:17

## 2020-01-04 RX ADMIN — ALBUTEROL 2 PUFF(S): 90 AEROSOL, METERED ORAL at 00:57

## 2020-01-04 RX ADMIN — OXYCODONE AND ACETAMINOPHEN 1 TABLET(S): 5; 325 TABLET ORAL at 01:30

## 2020-01-04 RX ADMIN — Medication 0.5 MILLIGRAM(S): at 05:15

## 2020-01-04 RX ADMIN — OXYCODONE AND ACETAMINOPHEN 1 TABLET(S): 5; 325 TABLET ORAL at 00:54

## 2020-01-04 RX ADMIN — SEVELAMER CARBONATE 2400 MILLIGRAM(S): 2400 POWDER, FOR SUSPENSION ORAL at 12:14

## 2020-01-04 RX ADMIN — ALBUTEROL 2 PUFF(S): 90 AEROSOL, METERED ORAL at 12:12

## 2020-01-04 RX ADMIN — SEVELAMER CARBONATE 2400 MILLIGRAM(S): 2400 POWDER, FOR SUSPENSION ORAL at 22:07

## 2020-01-04 RX ADMIN — INSULIN GLARGINE 13 UNIT(S): 100 INJECTION, SOLUTION SUBCUTANEOUS at 22:35

## 2020-01-04 RX ADMIN — OXYCODONE AND ACETAMINOPHEN 1 TABLET(S): 5; 325 TABLET ORAL at 17:03

## 2020-01-04 RX ADMIN — Medication 0.5 MILLIGRAM(S): at 17:54

## 2020-01-04 RX ADMIN — ALBUTEROL 2 PUFF(S): 90 AEROSOL, METERED ORAL at 17:54

## 2020-01-04 RX ADMIN — ALBUTEROL 2 PUFF(S): 90 AEROSOL, METERED ORAL at 05:19

## 2020-01-04 RX ADMIN — Medication 3 UNIT(S): at 17:53

## 2020-01-04 RX ADMIN — Medication 1: at 12:12

## 2020-01-04 RX ADMIN — Medication 1: at 17:53

## 2020-01-04 RX ADMIN — Medication 50 MILLIGRAM(S): at 05:15

## 2020-01-04 RX ADMIN — ATORVASTATIN CALCIUM 20 MILLIGRAM(S): 80 TABLET, FILM COATED ORAL at 22:07

## 2020-01-04 RX ADMIN — Medication 50 MILLIGRAM(S): at 22:07

## 2020-01-04 RX ADMIN — Medication 3 UNIT(S): at 08:17

## 2020-01-04 NOTE — DIETITIAN INITIAL EVALUATION ADULT. - ADD RECOMMEND
1. Continue Renal Restricted, Consistent Carbohydrate diet with Evening Snack as medically indicated.  2. Encourage adequate PO intake - pt declined nutritional supplement at this time, will monitor.  3. Provided renal diet education, will monitor need to follow up.  4. Will continue to monitor PO intake, renal labs, weights, BM, skin, clinical course.

## 2020-01-04 NOTE — PROGRESS NOTE ADULT - SUBJECTIVE AND OBJECTIVE BOX
Patient is a 64y old  Female who presents with a chief complaint of CC:64F p/w acute left toe pain (04 Jan 2020 11:17)      SUBJECTIVE / OVERNIGHT EVENTS:  Review of Systems  chest pain no  palpitations no  sob no  nausea no  headache no    MEDICATIONS  (STANDING):  ALBUTerol    90 MICROgram(s) HFA Inhaler 2 Puff(s) Inhalation every 6 hours  aspirin enteric coated 81 milliGRAM(s) Oral daily  atorvastatin 20 milliGRAM(s) Oral at bedtime  BACItracin   Ointment 1 Application(s) Topical daily  buDESOnide    Inhalation Suspension 0.5 milliGRAM(s) Inhalation two times a day  clopidogrel Tablet 75 milliGRAM(s) Oral daily  dextrose 5%. 1000 milliLiter(s) (50 mL/Hr) IV Continuous <Continuous>  dextrose 50% Injectable 12.5 Gram(s) IV Push once  dextrose 50% Injectable 25 Gram(s) IV Push once  dextrose 50% Injectable 25 Gram(s) IV Push once  heparin  Injectable 5000 Unit(s) SubCutaneous every 8 hours  hydrALAZINE 50 milliGRAM(s) Oral every 8 hours  insulin glargine Injectable (LANTUS) 13 Unit(s) SubCutaneous at bedtime  insulin lispro (HumaLOG) corrective regimen sliding scale   SubCutaneous three times a day before meals  insulin lispro (HumaLOG) corrective regimen sliding scale   SubCutaneous at bedtime  insulin lispro Injectable (HumaLOG) 3 Unit(s) SubCutaneous three times a day before meals  metoprolol succinate ER 50 milliGRAM(s) Oral daily  pantoprazole    Tablet 40 milliGRAM(s) Oral before breakfast  sevelamer carbonate 2400 milliGRAM(s) Oral three times a day    MEDICATIONS  (PRN):  acetaminophen   Tablet .. 650 milliGRAM(s) Oral every 6 hours PRN Mild Pain (1 - 3), Moderate Pain (4 - 6)  dextrose 40% Gel 15 Gram(s) Oral once PRN Blood Glucose LESS THAN 70 milliGRAM(s)/deciliter  glucagon  Injectable 1 milliGRAM(s) IntraMuscular once PRN Glucose LESS THAN 70 milligrams/deciliter      Vital Signs Last 24 Hrs  T(C): 36.6 (04 Jan 2020 13:00), Max: 36.9 (03 Jan 2020 20:43)  T(F): 97.9 (04 Jan 2020 13:00), Max: 98.5 (03 Jan 2020 20:43)  HR: 68 (04 Jan 2020 13:00) (67 - 94)  BP: 104/51 (04 Jan 2020 13:00) (103/53 - 113/66)  BP(mean): --  RR: 19 (04 Jan 2020 13:00) (17 - 19)  SpO2: 100% (04 Jan 2020 13:00) (99% - 100%)    PHYSICAL EXAM:  GENERAL: NAD, well-developed  HEAD:  Atraumatic, Normocephalic  EYES: EOMI, PERRLA, conjunctiva and sclera clear  NECK: Supple, No JVD  CHEST/LUNG: Clear to auscultation bilaterally; No wheeze  HEART: Regular rate and rhythm; No murmurs, rubs, or gallops  ABDOMEN: Soft, Nontender, Nondistended; Bowel sounds present  EXTREMITIES:  L toes with clean dressing, tender.  PSYCH: AAOx3  NEUROLOGY: non-focal  SKIN: No rashes or lesions    LABS:                        11.2   4.69  )-----------( 187      ( 04 Jan 2020 10:01 )             36.3     01-04    129<L>  |  88<L>  |  31<H>  ----------------------------<  250<H>  5.7<H>   |  28  |  6.03<H>    Ca    9.3      04 Jan 2020 06:20  Phos  5.7     01-04  Mg     2.5     01-04    TPro  6.8  /  Alb  3.9  /  TBili  0.4  /  DBili  x   /  AST  11  /  ALT  10  /  AlkPhos  135<H>  01-03    PT/INR - ( 02 Jan 2020 20:30 )   PT: 11.2 sec;   INR: 0.97 ratio         PTT - ( 02 Jan 2020 20:30 )  PTT:32.5 sec          Culture - Blood (collected 03 Jan 2020 03:00)  Source: .Blood Blood  Preliminary Report (04 Jan 2020 03:01):    No growth to date.    Culture - Blood (collected 03 Jan 2020 00:41)  Source: .Blood Blood  Preliminary Report (04 Jan 2020 01:02):    No growth to date.        RADIOLOGY & ADDITIONAL TESTS:    Imaging Personally Reviewed:    Consultant(s) Notes Reviewed:      Care Discussed with Consultants/Other Providers:

## 2020-01-04 NOTE — DIETITIAN INITIAL EVALUATION ADULT. - PHYSICAL APPEARANCE
other (specify) Pt declined physical examination at this time - in pain due to foot wound.  No overt signs of muscle or fat wasting observed.  Per flow sheets, pt with +1 bilateral leg and 2+ bilateral ankle edema.  Per nursing assessment, pt with diabetic L foot wound.    Ht: 64 inches, Wt: 117.2 pounds (1/4/20), BMI: 20.2 kg/m2; IBW: 120 pounds (+/-10%), %IBW: 98% Pt declined physical examination at this time - in pain due to foot wound.  No overt signs of muscle or fat wasting observed.  Per flow sheets, pt with +1 bilateral leg and 2+ bilateral ankle edema.  Per nursing assessment, pt with vascular L foot wound.    Ht: 64 inches, Wt: 117.2 pounds (1/4/20), BMI: 20.2 kg/m2; IBW: 120 pounds (+/-10%), %IBW: 98%

## 2020-01-04 NOTE — PROGRESS NOTE ADULT - ASSESSMENT
64F w/ extensive PMHx w/ c/c of left foot 2nd digit pain w/ progressive swelling  - Pt seen and evaluated  - Vitals stable, no leukocytosis, ESR 23  -  left foot 2nd digit s/p traumatic nail avulsion  - L foot x-rays reviewed and discussed w/ pt demonstrating acute fracture of left 4th proximal phalanx shaft & cortical irregularity of 2nd middle phalanx highly suspicious of fracture  - KATI/PVR completed - patient known to Dr. Hicks. Rec eval while in house.   - No acute surgical intervention  -WBAT to left foot with surgical shoe  -No need for dressing/local wound care  - Podiatry will continue to follow while in house  - Podiatrically stable for DC     Follow up with Dr. Xiang VARGAS within 1 week of discharge.  Call for appointment   [place] office: [phone]  [place] office: [phone]

## 2020-01-04 NOTE — DIETITIAN INITIAL EVALUATION ADULT. - PERTINENT LABORATORY DATA
(1/4/20) K: 5.7; Na: 129; BUN: 31; Cr: 6.03, Phosphorus: 5.7  (11/13) HgA1c: 8.7%  POCT Blood Glucose.: 160 mg/dL (04 Jan 2020 11:54)  POCT Blood Glucose.: 195 mg/dL (04 Jan 2020 07:54)  POCT Blood Glucose.: 385 mg/dL (03 Jan 2020 21:08)  POCT Blood Glucose.: 118 mg/dL (03 Jan 2020 18:01)  POCT Blood Glucose.: 81 mg/dL (03 Jan 2020 17:42)  POCT Blood Glucose.: 54 mg/dL (03 Jan 2020 17:21)  POCT Blood Glucose.: 49 mg/dL (03 Jan 2020 17:02)  POCT Blood Glucose.: 48 mg/dL (03 Jan 2020 16:59)

## 2020-01-04 NOTE — PROGRESS NOTE ADULT - SUBJECTIVE AND OBJECTIVE BOX
[facility]   [phone]  DIALYSIS NOTE  Chief Complaint: ESRD/Ongoing hemodialysis requirement    24 hour events/subjective:    pain + second toe         ALLERGIES & MEDICATIONS  --------------------------------------------------------------------------------  Allergies    No Known Allergies    Intolerances      Standing Inpatient Medications  ALBUTerol    90 MICROgram(s) HFA Inhaler 2 Puff(s) Inhalation every 6 hours  aspirin enteric coated 81 milliGRAM(s) Oral daily  atorvastatin 20 milliGRAM(s) Oral at bedtime  BACItracin   Ointment 1 Application(s) Topical daily  buDESOnide    Inhalation Suspension 0.5 milliGRAM(s) Inhalation two times a day  clopidogrel Tablet 75 milliGRAM(s) Oral daily  dextrose 5%. 1000 milliLiter(s) IV Continuous <Continuous>  dextrose 50% Injectable 12.5 Gram(s) IV Push once  dextrose 50% Injectable 25 Gram(s) IV Push once  dextrose 50% Injectable 25 Gram(s) IV Push once  heparin  Injectable 5000 Unit(s) SubCutaneous every 8 hours  hydrALAZINE 50 milliGRAM(s) Oral every 8 hours  insulin glargine Injectable (LANTUS) 13 Unit(s) SubCutaneous at bedtime  insulin lispro (HumaLOG) corrective regimen sliding scale   SubCutaneous three times a day before meals  insulin lispro (HumaLOG) corrective regimen sliding scale   SubCutaneous at bedtime  insulin lispro Injectable (HumaLOG) 3 Unit(s) SubCutaneous three times a day before meals  metoprolol succinate ER 50 milliGRAM(s) Oral daily  pantoprazole    Tablet 40 milliGRAM(s) Oral before breakfast  sevelamer carbonate 2400 milliGRAM(s) Oral three times a day    PRN Inpatient Medications  acetaminophen   Tablet .. 650 milliGRAM(s) Oral every 6 hours PRN  dextrose 40% Gel 15 Gram(s) Oral once PRN  glucagon  Injectable 1 milliGRAM(s) IntraMuscular once PRN      REVIEW OF SYSTEMS  --------------------------------------------------------------------------------  no itching or rash  no fever or chill  no cp or palp   no sob or cough   no N/V/D/ no abd pain   ext no edema         VITALS/PHYSICAL EXAM  --------------------------------------------------------------------------------  T(C): 36.8 (01-04-20 @ 17:26), Max: 36.9 (01-03-20 @ 20:43)  HR: 80 (01-04-20 @ 17:26) (67 - 94)  BP: 105/63 (01-04-20 @ 17:26) (103/53 - 113/66)  RR: 19 (01-04-20 @ 17:26) (17 - 19)  SpO2: 99% (01-04-20 @ 17:26) (99% - 100%)  Wt(kg): --  Height (cm): 162.6 (01-03-20 @ 14:00)  Weight (kg): 57.7 (01-03-20 @ 14:00)  BMI (kg/m2): 21.8 (01-03-20 @ 14:00)  BSA (m2): 1.61 (01-03-20 @ 14:00)      01-03-20 @ 07:01  -  01-04-20 @ 07:00  --------------------------------------------------------  IN: 420 mL / OUT: 0 mL / NET: 420 mL    01-04-20 @ 07:01  -  01-04-20 @ 18:54  --------------------------------------------------------  IN: 220 mL / OUT: 0 mL / NET: 220 mL      Physical Exam:  		  Gen: Non toxic but in pain from left 2nd toe   	no jvd ,  	Pulm: decrease bs  no rales or ronchi or wheezing  	CV: RRR, S1S2; no rub  	Abd: +BS, soft, nontender/nondistended  	: No suprapubic tenderness  	UE: Warm, no cyanosis  no clubbing,  no edema  	LE: Warm, no cyanosis  no clubbing, LLE 1-2-  edema  	Neuro: alert and oriented. speech coherent   	Skin: Warm, no decrease skin turgor  left 2nd toe mild erythema and discoloration with missing nail as well   	Vascular access: LUE + AVF + thrill and bruit   	    LABS/STUDIES  --------------------------------------------------------------------------------              11.2   4.69  >-----------<  187      [01-04-20 @ 10:01]              36.3     x   |  x   |  7   ----------------------------<  x       [01-04-20 @ 17:52]  x    |  x   |  x         Ca     9.3     [01-04-20 @ 06:20]      Mg     2.5     [01-04-20 @ 06:20]      Phos  5.7     [01-04-20 @ 06:20]    TPro  6.8  /  Alb  3.9  /  TBili  0.4  /  DBili  x   /  AST  11  /  ALT  10  /  AlkPhos  135  [01-03-20 @ 06:34]    PT/INR: PT 11.2 , INR 0.97       [01-02-20 @ 20:30]  PTT: 32.5       [01-02-20 @ 20:30]              imp/suggest: ESRD      Hemodialysis Prescription:  	Access:  	Dialyzer: revaclear   	Blood Flow (mL/Min): 400  	Dialysate Flow (mL/Min): 600  	Target UF (Liters):  	Treatment Time:  	Potassium:   	Calcium: 2.5  	  YOLANDA    Vitamin D     continue with hd   see hd flow sheet

## 2020-01-04 NOTE — DIETITIAN INITIAL EVALUATION ADULT. - OTHER INFO
Pt unable to recall any vitamin or supplement use PTA.  Pt with NKFA.  Pt denies nausea, vomiting, diarrhea, or constipation. Last BM today (1/4/20).      Pt states that appetite was good PTA and in house, reporting good intake, that she is "always eating."  Pt denies any recent weight loss, stating UBW at 118 pounds - current dry weight of 117.2 pounds (1/4/20).   1L fluid removed in HD yesterday (1/3/20).  Weights per previous RD notes: 110.8 pounds (8/7) -> 126 pounds (9/15). Per chart, pre HD weight of 123 pounds today (1/4/20).  Pt states she checks her BG 6-7 times per day and  and daughter help manage her DM.  States she watches her carbohydrate intake.  HgA1C of 8.7% suggests poor glycemic control maintenance PTA. Pt with diabetic L foot wound.  Pt states she is aware of potassium, sodium, and phosphorus restrictions on renal diet.    RD reviewed sources of high potassium and high phosphorus foods in the diet, patient able to teach back foods such as potatoes, tomatoes, bananas, nuts, dairy and chocolate.  Pt encouraged to follow consistent carbohydrate meal pattern and to consume adequate protein throughout the day for glycemic control and increased protein needs.  Pt expressed understanding.  Pt accepting of Hemodialysis Diet, Phosphorus and Potassium handouts.  Pt declined nutritional supplements at this time. Per chart: pt is a 63 y/o F with PMHx of CHF, CAD, DMT1 on insulin, ACS, TIA, CVA, gout, PVD, ESRD on dialysis presented with loss of left 2nd toenail with severe pain four days ago, with noted hyperkalemia and hyperglycemia on admission. Pt unable to recall any vitamin or supplement use PTA.  Pt with NKFA.  Pt denies nausea, vomiting, diarrhea, or constipation. Last BM today (1/4/20).  Pt endorses use of insulin at home.    Pt states that appetite was good PTA and in house, reporting good intake, that she is "always eating."  Pt denies any recent weight loss, stating UBW at 118 pounds - current dry weight of 117.2 pounds (1/4/20).   1L fluid removed in HD yesterday (1/3/20).  Weights per previous RD notes: 110.8 pounds (8/7) -> 126 pounds (9/15). Per chart, pre HD weight of 123 pounds today (1/4/20).  Pt states she checks her BG 6-7 times per day and  and daughter help manage her DM.  States she watches her carbohydrate intake.  HgA1C of 8.7% suggests poor glycemic control maintenance PTA. Pt with L foot wound.  Pt states she is aware of potassium, sodium, and phosphorus restrictions on renal diet.    RD reviewed sources of high potassium and high phosphorus foods in the diet, patient able to teach back foods such as potatoes, tomatoes, bananas, nuts, dairy and chocolate.  Pt encouraged to follow consistent carbohydrate meal pattern and to consume adequate protein throughout the day for glycemic control and increased protein needs.  Pt expressed understanding.  Pt accepting of Hemodialysis Diet, Phosphorus and Potassium handouts.  Pt declined nutritional supplements at this time. Per chart: pt is a 63 y/o F with PMHx of CHF, CAD, DMT1 on insulin, ACS, TIA, CVA, gout, PVD, ESRD on dialysis presented with loss of left 2nd toenail with severe pain four days ago, with noted hyperkalemia and hyperglycemia on admission. Pt unable to recall any vitamin or supplement use PTA.  Pt with NKFA.  Pt denies nausea, vomiting, diarrhea, or constipation. Last BM today (1/4/20).  Pt endorses use of insulin at home.    Pt states that appetite was good PTA and in house, reporting good intake, that she is "always eating."  Pt denies any recent weight loss, stating UBW at 118 pounds - current dry weight of 117.2 pounds (1/4/20).   1L fluid removed in HD yesterday (1/3/20).  Weights per previous RD notes: 110.8 pounds (8/7) -> 126 pounds (9/15). Per chart, pre HD weight of 123 pounds today (1/4/20).  Pt states she checks her BG 6-7 times per day and  and daughter help manage her DM.  States she watches her carbohydrate intake.  HgA1C of 8.7% suggests poor glycemic control maintenance PTA. Pt with L foot wound.  Pt states she is aware of potassium, sodium, and phosphorus restrictions on renal diet - noted current elevated potassium and phosphorus.    RD reviewed sources of high potassium and high phosphorus foods in the diet, patient able to teach back foods such as potatoes, tomatoes, bananas, nuts, dairy and chocolate.  Pt encouraged to follow consistent carbohydrate meal pattern and to consume adequate protein throughout the day for glycemic control and increased protein needs.  Pt expressed understanding.  Pt accepting of Hemodialysis Diet, Phosphorus and Potassium handouts.  Pt declined nutritional supplements at this time.

## 2020-01-04 NOTE — PROGRESS NOTE ADULT - ASSESSMENT
65 y/o F  with PMHx of CHF, CAD, DMT1 on insulin, ACS, TIA, CVA, gout, PVD, ESRD on dialysis with multiple admissions  presents today with loss of left 2nd toenail with severe pain four days ago.  Pain gradually worsening. Reports that she woke up with her nail hanging off, has pain on her left 2nd toe. in er noticed with hyperglycemia as well as hyperkalemia with k 6.7 on admission     1- esrd  2- hyperkalemia  3- HTN   4- shpt   5- cad  6- DM      min 2k f180 bfr 400 dfr 600 2 liter fluid removal   to have URR given k still high

## 2020-01-04 NOTE — PROGRESS NOTE ADULT - ASSESSMENT
Assessment:  · Assessment		  64F w/ ESRD on HD(M/T/Th/Sa), CAD s/p multiple stents/brachytherapy, mod MR, severe AS, RHF, CHF (EF 30% per last [facility]), DM1 c/b neuropathy and retinopathy, hx of TIA, severe peripheral artery disease s/p b/l fempop bypass c/b left thrombosed graft, left subclavian vein stenosis s/p stent, COPD not on O2, gout, hilar lymphadenopathy of unknown etiology, recent hospitalization for pneumonia d/c 12/9/19 presents with left toe pain and xray appearing c/w Left 2nd and 4th toe fracture incidentally found to have hyperkalemia despite HD on day of admit and therefore admitted for urgent HD w/ podiatry eval of left foot.    Hyperkalemia.   - in patient with ESRD despite HD  - nephrology follow  - received regular insulin in ED and dosed lantus 6U per orders  - f/u BMP in am to monitor, trending down on 2nd bmp  - hold lisinopril.     Closed nondisplaced fracture of distal phalanx of lesser toe of left foot, initial encounter.   - podiatry follow  - noted 2nd toe nail avulsion as well. per podiatry c/w bacitracin  - 2nd and 4th digit appear to have fracture  - given no leukocytosis and ESR on admit would monitor off antibiotics unless indicated by podiatry.   - vascular evaluation    Chronic obstructive pulmonary disease, unspecified COPD type.   - hx of COPD not on home Oxygen    ESRD (end stage renal disease).  - HD per nephrology.     Peripheral artery disease.   - c/w asa and plavix.   - vascular evaluation    Type 1 diabetes mellitus with hyperglycemia.   - endocrinology evaluation  - continue w/ home regimen for now lantus 13U and humalog 6U TID premeal w/ ISS.    Chronic systolic congestive heart failure.   - HOLD lisinopril given hyperkalemia  - continue with hydralazine 50 TID per nephrology with holding parameters    Coronary artery disease involving native coronary artery of native heart without angina pectoris.   - c/w asa, clopidogrel, statin    Pierce Viera MD pager [phone]

## 2020-01-04 NOTE — CONSULT NOTE ADULT - SUBJECTIVE AND OBJECTIVE BOX
CHIEF COMPLAINT: toe pain    HISTORY OF PRESENT ILLNESS:  64F w/ ESRD on HD(M/T/Th/Sa), CAD s/p multiple stents/brachytherapy, mod MR, severe AS, RHF, CHF (EF 30% per last [facility]), DM1 c/b neuropathy and retinopathy, hx of TIA, severe peripheral artery disease s/p b/l fempop bypass c/b left thrombosed graft, left subclavian vein stenosis s/p stent, COPD not on O2, gout, hilar lymphadenopathy of unknown etiology, recent hospitalization for pneumonia d/c 12/9/19 presents with left toe pain. Patient reports developing the pain today and denies fall or trauma to the toe. She reports some discoloration (darkening) but no purulence. The toe nail over left digit of left foot reportedly falling off but severe not until today. No fever, chills, cp, sob, cough, n/v/d, painful urination, or skin change other than over left toe. She reports completing session of HD on day of presentation.      Allergies  No Known Allergies      MEDICATIONS:  aspirin enteric coated 81 milliGRAM(s) Oral daily  clopidogrel Tablet 75 milliGRAM(s) Oral daily  heparin  Injectable 5000 Unit(s) SubCutaneous every 8 hours  hydrALAZINE 50 milliGRAM(s) Oral every 8 hours  ALBUTerol    90 MICROgram(s) HFA Inhaler 2 Puff(s) Inhalation every 6 hours  buDESOnide    Inhalation Suspension 0.5 milliGRAM(s) Inhalation two times a day  acetaminophen   Tablet .. 650 milliGRAM(s) Oral every 6 hours PRN  pantoprazole    Tablet 40 milliGRAM(s) Oral before breakfast  atorvastatin 20 milliGRAM(s) Oral at bedtime  dextrose 40% Gel 15 Gram(s) Oral once PRN  dextrose 50% Injectable 12.5 Gram(s) IV Push once  dextrose 50% Injectable 25 Gram(s) IV Push once  dextrose 50% Injectable 25 Gram(s) IV Push once  glucagon  Injectable 1 milliGRAM(s) IntraMuscular once PRN  insulin glargine Injectable (LANTUS) 13 Unit(s) SubCutaneous at bedtime  insulin lispro (HumaLOG) corrective regimen sliding scale   SubCutaneous three times a day before meals  insulin lispro (HumaLOG) corrective regimen sliding scale   SubCutaneous at bedtime  insulin lispro Injectable (HumaLOG) 3 Unit(s) SubCutaneous three times a day before meals  BACItracin   Ointment 1 Application(s) Topical daily  dextrose 5%. 1000 milliLiter(s) IV Continuous <Continuous>      PAST MEDICAL & SURGICAL HISTORY:  COPD (chronic obstructive pulmonary disease)  Localized enlarged lymph nodes  CHF (congestive heart failure): EF 40-45%  Subclavian vein stenosis, left: s/p stent  DKA, type 1: 1/2015  ACS (acute coronary syndrome): 1/2015 - cath revealed 100% ostial stenosis not amenable to PCI - medical management  TIA (transient ischemic attack): x 2 - 8-9 years ago prior to ASD/VSD repair  CAD (coronary artery disease): s/p stents  Gout: past  CVA (cerebral infarction): with no residual, 8 yrs ago, prior to heart surgery - ST memory loss  Peripheral vascular disease: occluded left fem-pop bypass 5/2015  Diabetes mellitus type 1: Insulin Dependent -  ESRD (end stage renal disease): dialysis  M, tue, thursday, saturday  Hyperlipidemia  Status post device closure of ASD: &quot;clamshell&quot;  History of cardiac catheterization: 1/2015 - no intervention  S/P femoral-popliteal bypass surgery: L and R in 2013 with graft; 5/2015 CFA angioplasty left and ileofemoral endarterectomywith vein patch angioplasty of left fem-pop bypass graft  Multiple vascular surgery both leg, left fempop bypass revision 11/2015  AV (arteriovenous fistula): Left AV graft; revision with stent placement 2-3 years ago  S/P cholecystectomy      FAMILY HISTORY:  Family history of smoking  Family history of hypertension  Family history of cancer (Sibling)      SOCIAL HISTORY:    former smoker. independent in adl      REVIEW OF SYSTEMS:  See HPI, otherwise complete 10 point review of systems negative    [ ] All others negative	      PHYSICAL EXAM:  T(C): 36.8 (01-04-20 @ 11:13), Max: 36.9 (01-03-20 @ 20:43)  HR: 72 (01-04-20 @ 11:13) (67 - 94)  BP: 103/53 (01-04-20 @ 11:13) (103/53 - 116/66)  RR: 18 (01-04-20 @ 11:13) (17 - 18)  SpO2: 99% (01-04-20 @ 11:13) (99% - 100%)  Wt(kg): --  I&O's Summary    03 Jan 2020 07:01  -  04 Jan 2020 07:00  --------------------------------------------------------  IN: 420 mL / OUT: 0 mL / NET: 420 mL    04 Jan 2020 07:01  -  04 Jan 2020 11:18  --------------------------------------------------------  IN: 100 mL / OUT: 0 mL / NET: 100 mL        Appearance: No Acute Distress	  HEENT:  Normal oral mucosa, PERRL, EOMI	  Cardiovascular: Normal S1 S2, No JVD, No murmurs/rubs/gallops  Respiratory: Lungs clear to auscultation bilaterally  Gastrointestinal:  Soft, Non-tender, + BS	  Skin: No rashes, No ecchymoses, No cyanosis	  Neurologic: Non-focal  Extremities: No clubbing, cyanosis or edema  Vascular: Peripheral pulses palpable 2+ bilaterally  Psychiatry: A & O x 3, Mood & affect appropriate    Laboratory Data:	 	    CBC Full  -  ( 04 Jan 2020 10:01 )  WBC Count : 4.69 K/uL  Hemoglobin : 11.2 g/dL  Hematocrit : 36.3 %  Platelet Count - Automated : 187 K/uL  Mean Cell Volume : 104.3 fl  Mean Cell Hemoglobin : 32.2 pg  Mean Cell Hemoglobin Concentration : 30.9 gm/dL  Auto Neutrophil # : x  Auto Lymphocyte # : x  Auto Monocyte # : x  Auto Eosinophil # : x  Auto Basophil # : x  Auto Neutrophil % : x  Auto Lymphocyte % : x  Auto Monocyte % : x  Auto Eosinophil % : x  Auto Basophil % : x    01-04    129<L>  |  88<L>  |  31<H>  ----------------------------<  250<H>  5.7<H>   |  28  |  6.03<H>  01-03    134<L>  |  89<L>  |  12  ----------------------------<  254<H>  5.3   |  25  |  3.75<H>    Ca    9.3      04 Jan 2020 06:20  Ca    10.4      03 Jan 2020 06:34  Phos  5.7     01-04  Phos  5.0     01-03  Mg     2.5     01-04  Mg     2.1     01-03    TPro  6.8  /  Alb  3.9  /  TBili  0.4  /  DBili  x   /  AST  11  /  ALT  10  /  AlkPhos  135<H>  01-03  TPro  7.9  /  Alb  4.5  /  TBili  0.3  /  DBili  x   /  AST  20  /  ALT  7<L>  /  AlkPhos  153<H>  01-02        Interpretation of Telemetry: 	  nsr 4 beats nsvt  ECG:  	nsr nonspecific st changes  	      Assessment:  -acute left toe pain  -electrolyte abnormalities  -right lower lobe opacity  -NSVT  -ischemic cardiomyopathy c/b mod to severe LV dysfunction, cad s/p multiple stents  -esrd on hd  -PAD s/p prior intervention       Recs:  -podiatry recs appreciated. f/u humberto. concerning for ischemic etiology  -optimization of volume status and electrolyte abnormalities with hd. renal appreciated  -known extensive CAD and ICM. s/p [facility] 2/20/2019 --> severe and diffuse instent restenosis along RCA --> unable to stent due to multiple layers of stenting and prior brachytherapy. denies any true ischemic sx at present  -c/w beta blockers for nsvt/pat/cad (as above). will resume toprol 50mg. also previously on hydral and lisinopril uptitrate GDMT as tolerates. patient previously declined ICD for primary prevention, will cont to address  -hx of prolonged qtc. avoid qtc prolonging meds  -s/p TTE 2019: mod-severe MR, pseudo AS (low flow-low gradient), mod-severe LV dysfunction --> recent CTS consult with dr hebert appreciated. plan is for medical management given surgical risk and patient preference  -c/w asa, plavix, statin for ischemic cardiomyopathy/CAD/PAD      Greater than 60 minutes spent on total encounter; more than 50% of the visit was spent counseling and/or coordinating care by the attending physician.   	  Julián Biswas MD   Cardiovascular Diseases  [phone] CHIEF COMPLAINT: toe pain    HISTORY OF PRESENT ILLNESS:  64F w/ ESRD on HD(M/T/Th/Sa), CAD s/p multiple stents/brachytherapy, mod MR, severe AS, RHF, CHF (EF 30% per last [facility]), DM1 c/b neuropathy and retinopathy, hx of TIA, severe peripheral artery disease s/p b/l fempop bypass c/b left thrombosed graft, left subclavian vein stenosis s/p stent, COPD not on O2, gout, hilar lymphadenopathy of unknown etiology, recent hospitalization for pneumonia d/c 12/9/19 presents with left toe pain. Patient reports developing the pain today and denies fall or trauma to the toe. She reports some discoloration (darkening) but no purulence. The toe nail over left digit of left foot reportedly falling off but severe not until today. No fever, chills, cp, sob, cough, n/v/d, painful urination, or skin change other than over left toe. She reports completing session of HD on day of presentation.      Allergies  No Known Allergies      MEDICATIONS:  aspirin enteric coated 81 milliGRAM(s) Oral daily  clopidogrel Tablet 75 milliGRAM(s) Oral daily  heparin  Injectable 5000 Unit(s) SubCutaneous every 8 hours  hydrALAZINE 50 milliGRAM(s) Oral every 8 hours  ALBUTerol    90 MICROgram(s) HFA Inhaler 2 Puff(s) Inhalation every 6 hours  buDESOnide    Inhalation Suspension 0.5 milliGRAM(s) Inhalation two times a day  acetaminophen   Tablet .. 650 milliGRAM(s) Oral every 6 hours PRN  pantoprazole    Tablet 40 milliGRAM(s) Oral before breakfast  atorvastatin 20 milliGRAM(s) Oral at bedtime  dextrose 40% Gel 15 Gram(s) Oral once PRN  dextrose 50% Injectable 12.5 Gram(s) IV Push once  dextrose 50% Injectable 25 Gram(s) IV Push once  dextrose 50% Injectable 25 Gram(s) IV Push once  glucagon  Injectable 1 milliGRAM(s) IntraMuscular once PRN  insulin glargine Injectable (LANTUS) 13 Unit(s) SubCutaneous at bedtime  insulin lispro (HumaLOG) corrective regimen sliding scale   SubCutaneous three times a day before meals  insulin lispro (HumaLOG) corrective regimen sliding scale   SubCutaneous at bedtime  insulin lispro Injectable (HumaLOG) 3 Unit(s) SubCutaneous three times a day before meals  BACItracin   Ointment 1 Application(s) Topical daily  dextrose 5%. 1000 milliLiter(s) IV Continuous <Continuous>      PAST MEDICAL & SURGICAL HISTORY:  COPD (chronic obstructive pulmonary disease)  Localized enlarged lymph nodes  CHF (congestive heart failure): EF 40-45%  Subclavian vein stenosis, left: s/p stent  DKA, type 1: 1/2015  ACS (acute coronary syndrome): 1/2015 - cath revealed 100% ostial stenosis not amenable to PCI - medical management  TIA (transient ischemic attack): x 2 - 8-9 years ago prior to ASD/VSD repair  CAD (coronary artery disease): s/p stents  Gout: past  CVA (cerebral infarction): with no residual, 8 yrs ago, prior to heart surgery - ST memory loss  Peripheral vascular disease: occluded left fem-pop bypass 5/2015  Diabetes mellitus type 1: Insulin Dependent -  ESRD (end stage renal disease): dialysis  M, tue, thursday, saturday  Hyperlipidemia  Status post device closure of ASD: &quot;clamshell&quot;  History of cardiac catheterization: 1/2015 - no intervention  S/P femoral-popliteal bypass surgery: L and R in 2013 with graft; 5/2015 CFA angioplasty left and ileofemoral endarterectomywith vein patch angioplasty of left fem-pop bypass graft  Multiple vascular surgery both leg, left fempop bypass revision 11/2015  AV (arteriovenous fistula): Left AV graft; revision with stent placement 2-3 years ago  S/P cholecystectomy      FAMILY HISTORY:  Family history of smoking  Family history of hypertension  Family history of cancer (Sibling)      SOCIAL HISTORY:    former smoker. independent in adl      REVIEW OF SYSTEMS:  See HPI, otherwise complete 10 point review of systems negative    [ ] All others negative	      PHYSICAL EXAM:  T(C): 36.8 (01-04-20 @ 11:13), Max: 36.9 (01-03-20 @ 20:43)  HR: 72 (01-04-20 @ 11:13) (67 - 94)  BP: 103/53 (01-04-20 @ 11:13) (103/53 - 116/66)  RR: 18 (01-04-20 @ 11:13) (17 - 18)  SpO2: 99% (01-04-20 @ 11:13) (99% - 100%)  Wt(kg): --  I&O's Summary    03 Jan 2020 07:01  -  04 Jan 2020 07:00  --------------------------------------------------------  IN: 420 mL / OUT: 0 mL / NET: 420 mL    04 Jan 2020 07:01  -  04 Jan 2020 11:18  --------------------------------------------------------  IN: 100 mL / OUT: 0 mL / NET: 100 mL        Appearance: No Acute Distress	  HEENT:  Normal oral mucosa, PERRL, EOMI	  Cardiovascular: Normal S1 S2, No JVD, No murmurs/rubs/gallops  Respiratory: Lungs clear to auscultation bilaterally  Gastrointestinal:  Soft, Non-tender, + BS	  Skin: No rashes, No ecchymoses, No cyanosis	  Neurologic: Non-focal  Extremities: No clubbing, cyanosis or edema  Vascular: Peripheral pulses not palpable in b/l LE  Psychiatry: A & O x 3, Mood & affect appropriate    Laboratory Data:	 	    CBC Full  -  ( 04 Jan 2020 10:01 )  WBC Count : 4.69 K/uL  Hemoglobin : 11.2 g/dL  Hematocrit : 36.3 %  Platelet Count - Automated : 187 K/uL  Mean Cell Volume : 104.3 fl  Mean Cell Hemoglobin : 32.2 pg  Mean Cell Hemoglobin Concentration : 30.9 gm/dL  Auto Neutrophil # : x  Auto Lymphocyte # : x  Auto Monocyte # : x  Auto Eosinophil # : x  Auto Basophil # : x  Auto Neutrophil % : x  Auto Lymphocyte % : x  Auto Monocyte % : x  Auto Eosinophil % : x  Auto Basophil % : x    01-04    129<L>  |  88<L>  |  31<H>  ----------------------------<  250<H>  5.7<H>   |  28  |  6.03<H>  01-03    134<L>  |  89<L>  |  12  ----------------------------<  254<H>  5.3   |  25  |  3.75<H>    Ca    9.3      04 Jan 2020 06:20  Ca    10.4      03 Jan 2020 06:34  Phos  5.7     01-04  Phos  5.0     01-03  Mg     2.5     01-04  Mg     2.1     01-03    TPro  6.8  /  Alb  3.9  /  TBili  0.4  /  DBili  x   /  AST  11  /  ALT  10  /  AlkPhos  135<H>  01-03  TPro  7.9  /  Alb  4.5  /  TBili  0.3  /  DBili  x   /  AST  20  /  ALT  7<L>  /  AlkPhos  153<H>  01-02        Interpretation of Telemetry: 	  nsr 4 beats nsvt  ECG:  	nsr nonspecific st changes  	      Assessment:  -acute left toe pain  -electrolyte abnormalities  -right lower lobe opacity  -NSVT  -ischemic cardiomyopathy c/b mod to severe LV dysfunction, cad s/p multiple stents  -esrd on hd  -PAD s/p prior intervention       Recs:  -podiatry recs appreciated. humberto results. concerning for ischemic etiology. f/u vasc  -optimization of volume status and electrolyte abnormalities with hd. renal appreciated  -known extensive CAD and ICM. s/p C 2/20/2019 --> severe and diffuse instent restenosis along RCA --> unable to stent due to multiple layers of stenting and prior brachytherapy. denies any true ischemic sx at present  -c/w beta blockers for nsvt/pat/cad (as above). will resume toprol 50mg. also previously on hydral and lisinopril uptitrate GDMT as tolerates. patient previously declined ICD for primary prevention, will cont to address  -hx of prolonged qtc. avoid qtc prolonging meds  -s/p TTE 2019: mod-severe MR, pseudo AS (low flow-low gradient), mod-severe LV dysfunction --> recent CTS consult with dr hebert appreciated. plan is for medical management given surgical risk and patient preference  -c/w asa, plavix, statin for ischemic cardiomyopathy/CAD/PAD      Greater than 60 minutes spent on total encounter; more than 50% of the visit was spent counseling and/or coordinating care by the attending physician.   	  Julián Biswas MD   Cardiovascular Diseases  [phone]

## 2020-01-05 DIAGNOSIS — E78.49 OTHER HYPERLIPIDEMIA: ICD-10-CM

## 2020-01-05 DIAGNOSIS — I10 ESSENTIAL (PRIMARY) HYPERTENSION: ICD-10-CM

## 2020-01-05 LAB
ANION GAP SERPL CALC-SCNC: 14 MMOL/L — SIGNIFICANT CHANGE UP (ref 5–17)
BUN SERPL-MCNC: 19 MG/DL — SIGNIFICANT CHANGE UP (ref 7–23)
CALCIUM SERPL-MCNC: 9.2 MG/DL — SIGNIFICANT CHANGE UP (ref 8.4–10.5)
CHLORIDE SERPL-SCNC: 90 MMOL/L — LOW (ref 96–108)
CO2 SERPL-SCNC: 23 MMOL/L — SIGNIFICANT CHANGE UP (ref 22–31)
CREAT SERPL-MCNC: 4.73 MG/DL — HIGH (ref 0.5–1.3)
GLUCOSE BLDC GLUCOMTR-MCNC: 187 MG/DL — HIGH (ref 70–99)
GLUCOSE BLDC GLUCOMTR-MCNC: 204 MG/DL — HIGH (ref 70–99)
GLUCOSE BLDC GLUCOMTR-MCNC: 310 MG/DL — HIGH (ref 70–99)
GLUCOSE BLDC GLUCOMTR-MCNC: 312 MG/DL — HIGH (ref 70–99)
GLUCOSE SERPL-MCNC: 408 MG/DL — HIGH (ref 70–99)
HCT VFR BLD CALC: 36.6 % — SIGNIFICANT CHANGE UP (ref 34.5–45)
HGB BLD-MCNC: 11.2 G/DL — LOW (ref 11.5–15.5)
MCHC RBC-ENTMCNC: 30.6 GM/DL — LOW (ref 32–36)
MCHC RBC-ENTMCNC: 32 PG — SIGNIFICANT CHANGE UP (ref 27–34)
MCV RBC AUTO: 104.6 FL — HIGH (ref 80–100)
PLATELET # BLD AUTO: 185 K/UL — SIGNIFICANT CHANGE UP (ref 150–400)
POTASSIUM SERPL-MCNC: 5.6 MMOL/L — HIGH (ref 3.5–5.3)
POTASSIUM SERPL-SCNC: 5.6 MMOL/L — HIGH (ref 3.5–5.3)
RBC # BLD: 3.5 M/UL — LOW (ref 3.8–5.2)
RBC # FLD: 14.1 % — SIGNIFICANT CHANGE UP (ref 10.3–14.5)
SODIUM SERPL-SCNC: 127 MMOL/L — LOW (ref 135–145)
WBC # BLD: 5.1 K/UL — SIGNIFICANT CHANGE UP (ref 3.8–10.5)
WBC # FLD AUTO: 5.1 K/UL — SIGNIFICANT CHANGE UP (ref 3.8–10.5)

## 2020-01-05 PROCEDURE — 99223 1ST HOSP IP/OBS HIGH 75: CPT | Mod: GC

## 2020-01-05 RX ORDER — OXYCODONE AND ACETAMINOPHEN 5; 325 MG/1; MG/1
1 TABLET ORAL ONCE
Refills: 0 | Status: DISCONTINUED | OUTPATIENT
Start: 2020-01-05 | End: 2020-01-05

## 2020-01-05 RX ORDER — INSULIN LISPRO 100/ML
4 VIAL (ML) SUBCUTANEOUS
Refills: 0 | Status: DISCONTINUED | OUTPATIENT
Start: 2020-01-05 | End: 2020-01-10

## 2020-01-05 RX ORDER — SODIUM ZIRCONIUM CYCLOSILICATE 10 G/10G
10 POWDER, FOR SUSPENSION ORAL ONCE
Refills: 0 | Status: COMPLETED | OUTPATIENT
Start: 2020-01-05 | End: 2020-01-05

## 2020-01-05 RX ADMIN — PANTOPRAZOLE SODIUM 40 MILLIGRAM(S): 20 TABLET, DELAYED RELEASE ORAL at 05:22

## 2020-01-05 RX ADMIN — INSULIN GLARGINE 13 UNIT(S): 100 INJECTION, SOLUTION SUBCUTANEOUS at 22:28

## 2020-01-05 RX ADMIN — Medication 50 MILLIGRAM(S): at 05:22

## 2020-01-05 RX ADMIN — Medication 3 UNIT(S): at 08:20

## 2020-01-05 RX ADMIN — CLOPIDOGREL BISULFATE 75 MILLIGRAM(S): 75 TABLET, FILM COATED ORAL at 11:54

## 2020-01-05 RX ADMIN — SEVELAMER CARBONATE 2400 MILLIGRAM(S): 2400 POWDER, FOR SUSPENSION ORAL at 05:22

## 2020-01-05 RX ADMIN — OXYCODONE AND ACETAMINOPHEN 1 TABLET(S): 5; 325 TABLET ORAL at 07:02

## 2020-01-05 RX ADMIN — ALBUTEROL 2 PUFF(S): 90 AEROSOL, METERED ORAL at 11:55

## 2020-01-05 RX ADMIN — Medication 81 MILLIGRAM(S): at 11:55

## 2020-01-05 RX ADMIN — OXYCODONE AND ACETAMINOPHEN 1 TABLET(S): 5; 325 TABLET ORAL at 01:15

## 2020-01-05 RX ADMIN — SEVELAMER CARBONATE 2400 MILLIGRAM(S): 2400 POWDER, FOR SUSPENSION ORAL at 11:58

## 2020-01-05 RX ADMIN — Medication 2: at 17:23

## 2020-01-05 RX ADMIN — SODIUM ZIRCONIUM CYCLOSILICATE 10 GRAM(S): 10 POWDER, FOR SUSPENSION ORAL at 21:49

## 2020-01-05 RX ADMIN — ALBUTEROL 2 PUFF(S): 90 AEROSOL, METERED ORAL at 05:24

## 2020-01-05 RX ADMIN — OXYCODONE AND ACETAMINOPHEN 1 TABLET(S): 5; 325 TABLET ORAL at 00:43

## 2020-01-05 RX ADMIN — Medication 0.5 MILLIGRAM(S): at 17:24

## 2020-01-05 RX ADMIN — Medication 1 APPLICATION(S): at 13:57

## 2020-01-05 RX ADMIN — Medication 2: at 22:28

## 2020-01-05 RX ADMIN — Medication 3 UNIT(S): at 11:55

## 2020-01-05 RX ADMIN — Medication 1: at 11:55

## 2020-01-05 RX ADMIN — Medication 4: at 08:19

## 2020-01-05 RX ADMIN — ALBUTEROL 2 PUFF(S): 90 AEROSOL, METERED ORAL at 17:24

## 2020-01-05 RX ADMIN — Medication 0.5 MILLIGRAM(S): at 05:22

## 2020-01-05 RX ADMIN — ATORVASTATIN CALCIUM 20 MILLIGRAM(S): 80 TABLET, FILM COATED ORAL at 22:29

## 2020-01-05 RX ADMIN — Medication 50 MILLIGRAM(S): at 22:29

## 2020-01-05 RX ADMIN — OXYCODONE AND ACETAMINOPHEN 1 TABLET(S): 5; 325 TABLET ORAL at 07:38

## 2020-01-05 NOTE — CONSULT NOTE ADULT - ASSESSMENT
64F w/ ESRD on HD(M/T/Th/Sa), CAD s/p multiple stents/brachytherapy, mod MR, severe AS, RHF, CHF (EF 30% per last [facility]), DM1 c/b neuropathy and retinopathy, hx of TIA, severe peripheral artery disease s/p b/l fempop bypass c/b left thrombosed graft, left subclavian vein stenosis s/p stent, COPD not on O2, gout, hilar lymphadenopathy of unknown etiology presents with left toe fracture

## 2020-01-05 NOTE — CONSULT NOTE ADULT - SUBJECTIVE AND OBJECTIVE BOX
VASCULAR SURGERY CONSULT NOTE  --------------------------------------------------------------------------------------------  HPI:  64F w/ ESRD on HD(M/T/Th/Sa), CAD s/p multiple stents/brachytherapy, mod MR, severe AS, RHF, CHF (EF 30% per last [facility]), DM1 c/b neuropathy and retinopathy, hx of TIA, severe peripheral artery disease s/p b/l fempop bypass c/b left thrombosed graft, left subclavian vein stenosis s/p stent, COPD not on O2, gout, hilar lymphadenopathy of unknown etiology, recent hospitalization for pneumonia d/c 12/9/19 presents with left toe pain. Patient reports developing the pain today and denies fall or trauma to the toe. She reports some discoloration (darkening) but no purulence. The toe nail over left digit of left foot reportedly falling off but severe not until today. No fever, chills, cp, sob, cough, n/v/d, painful urination, or skin change other than over left toe. She reports completing session of HD on day of presentation. Vascular Surgery consulted due to concerns for poor perfusion.    PAST MEDICAL & SURGICAL HISTORY:  COPD (chronic obstructive pulmonary disease)  Localized enlarged lymph nodes  CHF (congestive heart failure): EF 40-45%  Subclavian vein stenosis, left: s/p stent  DKA, type 1: 1/2015  ACS (acute coronary syndrome): 1/2015 - cath revealed 100% ostial stenosis not amenable to PCI - medical management  TIA (transient ischemic attack): x 2 - 8-9 years ago prior to ASD/VSD repair  CAD (coronary artery disease): s/p stents  Gout: past  CVA (cerebral infarction): with no residual, 8 yrs ago, prior to heart surgery - ST memory loss  Peripheral vascular disease: occluded left fem-pop bypass 5/2015  Diabetes mellitus type 1: Insulin Dependent -  ESRD (end stage renal disease): dialysis  M, tue, thursday, saturday  Hyperlipidemia  Status post device closure of ASD: &quot;[place]&quot;  History of cardiac catheterization: 1/2015 - no intervention  S/P femoral-popliteal bypass surgery: L and R in 2013 with graft; 5/2015 CFA angioplasty left and ileofemoral endarterectomywith vein patch angioplasty of left fem-pop bypass graft  Multiple vascular surgery both leg, left fempop bypass revision 11/2015  AV (arteriovenous fistula): Left AV graft; revision with stent placement 2-3 years ago  S/P cholecystectomy    FAMILY HISTORY:  Family history of smoking  Family history of hypertension  Family history of cancer (Sibling)    ALLERGIES: No Known Allergies    CURRENT MEDICATIONS  MEDICATIONS (STANDING): ALBUTerol    90 MICROgram(s) HFA Inhaler 2 Puff(s) Inhalation every 6 hours  aspirin enteric coated 81 milliGRAM(s) Oral daily  atorvastatin 20 milliGRAM(s) Oral at bedtime  buDESOnide    Inhalation Suspension 0.5 milliGRAM(s) Inhalation two times a day  clopidogrel Tablet 75 milliGRAM(s) Oral daily  dextrose 5%. 1000 milliLiter(s) IV Continuous <Continuous>  dextrose 50% Injectable 12.5 Gram(s) IV Push once  dextrose 50% Injectable 25 Gram(s) IV Push once  dextrose 50% Injectable 25 Gram(s) IV Push once  heparin  Injectable 5000 Unit(s) SubCutaneous every 8 hours  hydrALAZINE 50 milliGRAM(s) Oral every 8 hours  insulin glargine Injectable (LANTUS) 13 Unit(s) SubCutaneous at bedtime  insulin lispro (HumaLOG) corrective regimen sliding scale   SubCutaneous three times a day before meals  insulin lispro (HumaLOG) corrective regimen sliding scale   SubCutaneous at bedtime  insulin lispro Injectable (HumaLOG) 3 Unit(s) SubCutaneous three times a day before meals  metoprolol succinate ER 50 milliGRAM(s) Oral daily  pantoprazole    Tablet 40 milliGRAM(s) Oral before breakfast  sevelamer carbonate 2400 milliGRAM(s) Oral three times a day    MEDICATIONS (PRN):acetaminophen   Tablet .. 650 milliGRAM(s) Oral every 6 hours PRN Mild Pain (1 - 3), Moderate Pain (4 - 6)  dextrose 40% Gel 15 Gram(s) Oral once PRN Blood Glucose LESS THAN 70 milliGRAM(s)/deciliter  glucagon  Injectable 1 milliGRAM(s) IntraMuscular once PRN Glucose LESS THAN 70 milligrams/deciliter    --------------------------------------------------------------------------------------------    Vitals:   T(C): 36.7 (01-05-20 @ 04:23), Max: 36.8 (01-04-20 @ 11:13)  HR: 73 (01-05-20 @ 04:23) (68 - 99)  BP: 104/61 (01-05-20 @ 04:23) (103/53 - 108/55)  RR: 18 (01-05-20 @ 04:23) (18 - 19)  SpO2: 99% (01-05-20 @ 04:23) (99% - 100%)  CAPILLARY BLOOD GLUCOSE      POCT Blood Glucose.: 258 mg/dL (04 Jan 2020 22:22)  POCT Blood Glucose.: 249 mg/dL (04 Jan 2020 21:30)  POCT Blood Glucose.: 161 mg/dL (04 Jan 2020 17:45)  POCT Blood Glucose.: 160 mg/dL (04 Jan 2020 11:54)  POCT Blood Glucose.: 195 mg/dL (04 Jan 2020 07:54)      01-03 @ 07:01  -  01-04 @ 07:00  --------------------------------------------------------  IN:    Oral Fluid: 420 mL  Total IN: 420 mL    OUT:  Total OUT: 0 mL    Total NET: 420 mL      01-04 @ 07:01  -  01-05 @ 06:47  --------------------------------------------------------  IN:    Oral Fluid: 220 mL  Total IN: 220 mL    OUT:  Total OUT: 0 mL    Total NET: 220 mL        Height (cm): 162.6 (01-03 @ 14:00)  Weight (kg): 57.7 (01-03 @ 14:00)  BMI (kg/m2): 21.8 (01-03 @ 14:00)  BSA (m2): 1.61 (01-03 @ 14:00)      PHYSICAL EXAM:  General: NAD, Lying in bed comfortably  Neuro: A+Ox3  HEENT: NC/AT, EOMI  Resp: Good effort, CTA b/l  Vascular: All 4 extremities warm. Positive doppler signals in bilateral LE  Skin: RLE 2nd toe with displaced toe-nail, no purulent drainage.  Musculoskeletal: All 4 extremities moving spontaneously, no limitations  --------------------------------------------------------------------------------------------    LABS  CBC (01-04 @ 10:01)                              11.2<L>                         4.69    )----------------(  187        --    % Neutrophils, --    % Lymphocytes, ANC: --                                  36.3      BMP (01-04 @ 23:28)             --      |  --      |  27<H> 		Ca++ --      Ca --                 ---------------------------------( --    		Mg --                 --      |  --      |  --    			Ph --      BMP (01-04 @ 17:52)             --      |  --      |  7     		Ca++ --      Ca --                 ---------------------------------( --    		Mg --                 --      |  --      |  --    			Ph --                  --------------------------------------------------------------------------------------------    MICROBIOLOGY    -> .Blood Blood Culture (01-03 @ 03:00)     NG    NG    No growth to date.    -> .Blood Blood Culture (01-03 @ 00:41)     NG    NG    No growth to date.      --------------------------------------------------------------------------------------------    IMAGING  < from: [facility] Duplex Low Ext [facility]terial[facility], Left (12.03.19 @ 11:39) >  Findings:     There is an occluded synthetic femoral-popliteal bypass graft.     There is a patent in situ, LEFT common femoral to proximal posterior   tibial, vein graft. The graft has become arterialized with atheromatous   changes.     The peak systolic velocities through the vein graft are; 57 cm/s at the   proximal anastomosis, 107 cm/s at the proximal thigh, 138 cm/s mid thigh,   63   cm/s distal graft and at the distal anastomosis to the proximal tibial   artery 45 cm/s.     There appears to be a patent short stent in the native runoff posterior   tibial artery at the surgical anastomosis with velocities measuring 65/15   cm/s     The peak systolic velocities of the native LEFT lower extremity arteries   are as follows:     Distal external iliac artery: 135 cm/s   Common femoral artery: 114 cm/s   Deep femoral artery: 62 cm/s   Superficial femoral artery: OCCLUDED   Popliteal artery: OCCLUDED   Posterior tibial artery: OCCLUDED mid and distal   Peroneal artery: OCCLUDED   Anterior tibial artery: 63 cm/s proximal, 43 cm/s mid, 92 cm/s distal   Dorsalis pedis artery: 97 cm/s     Impression:     There is a patent LEFT common femoral to posterior tibial vein graft,   however the runoff posterior tibial artery is occluded.   There is an intact LEFT anterior tibial artery running through the calf.    < end of copied text >      --------------------------------------------------------------------------------------------

## 2020-01-05 NOTE — PROGRESS NOTE ADULT - SUBJECTIVE AND OBJECTIVE BOX
Patient is a 64y old  Female who presents with a chief complaint of CC:64F p/w acute left toe pain (05 Jan 2020 12:44)      SUBJECTIVE / OVERNIGHT EVENTS: No new complaints.   Review of Systems  chest pain no  palpitations no  sob no  nausea no  headache no    MEDICATIONS  (STANDING):  ALBUTerol    90 MICROgram(s) HFA Inhaler 2 Puff(s) Inhalation every 6 hours  aspirin enteric coated 81 milliGRAM(s) Oral daily  atorvastatin 20 milliGRAM(s) Oral at bedtime  BACItracin   Ointment 1 Application(s) Topical daily  buDESOnide    Inhalation Suspension 0.5 milliGRAM(s) Inhalation two times a day  clopidogrel Tablet 75 milliGRAM(s) Oral daily  dextrose 5%. 1000 milliLiter(s) (50 mL/Hr) IV Continuous <Continuous>  dextrose 50% Injectable 12.5 Gram(s) IV Push once  dextrose 50% Injectable 25 Gram(s) IV Push once  dextrose 50% Injectable 25 Gram(s) IV Push once  heparin  Injectable 5000 Unit(s) SubCutaneous every 8 hours  hydrALAZINE 50 milliGRAM(s) Oral every 8 hours  insulin glargine Injectable (LANTUS) 13 Unit(s) SubCutaneous at bedtime  insulin lispro (HumaLOG) corrective regimen sliding scale   SubCutaneous three times a day before meals  insulin lispro (HumaLOG) corrective regimen sliding scale   SubCutaneous at bedtime  insulin lispro Injectable (HumaLOG) 4 Unit(s) SubCutaneous three times a day before meals  metoprolol succinate ER 50 milliGRAM(s) Oral daily  pantoprazole    Tablet 40 milliGRAM(s) Oral before breakfast  sevelamer carbonate 2400 milliGRAM(s) Oral three times a day    MEDICATIONS  (PRN):  acetaminophen   Tablet .. 650 milliGRAM(s) Oral every 6 hours PRN Mild Pain (1 - 3), Moderate Pain (4 - 6)  dextrose 40% Gel 15 Gram(s) Oral once PRN Blood Glucose LESS THAN 70 milliGRAM(s)/deciliter  glucagon  Injectable 1 milliGRAM(s) IntraMuscular once PRN Glucose LESS THAN 70 milligrams/deciliter      Vital Signs Last 24 Hrs  T(C): 36.4 (05 Jan 2020 11:15), Max: 36.8 (04 Jan 2020 17:26)  T(F): 97.5 (05 Jan 2020 11:15), Max: 98.3 (04 Jan 2020 17:26)  HR: 68 (05 Jan 2020 11:15) (68 - 99)  BP: 97/52 (05 Jan 2020 11:15) (97/52 - 108/55)  BP(mean): --  RR: 18 (05 Jan 2020 11:15) (18 - 19)  SpO2: 100% (05 Jan 2020 11:15) (99% - 100%)    PHYSICAL EXAM:  GENERAL: NAD, well-developed  HEAD:  Atraumatic, Normocephalic  EYES: EOMI, PERRLA, conjunctiva and sclera clear  NECK: Supple, No JVD  CHEST/LUNG: Clear to auscultation bilaterally; No wheeze  HEART: Regular rate and rhythm; No murmurs, rubs, or gallops  ABDOMEN: Soft, Nontender, Nondistended; Bowel sounds present  EXTREMITIES:  L foot with dressing  PSYCH: AAOx3  NEUROLOGY: non-focal  SKIN: No rashes or lesions    LABS:                        11.2   5.10  )-----------( 185      ( 05 Jan 2020 08:59 )             36.6     01-05    127<L>  |  90<L>  |  19  ----------------------------<  408<H>  5.6<H>   |  23  |  4.73<H>    Ca    9.2      05 Jan 2020 06:22  Phos  5.7     01-04  Mg     2.5     01-04                Culture - Blood (collected 03 Jan 2020 03:00)  Source: .Blood Blood  Preliminary Report (04 Jan 2020 03:01):    No growth to date.    Culture - Blood (collected 03 Jan 2020 00:41)  Source: .Blood Blood  Preliminary Report (04 Jan 2020 01:02):    No growth to date.        RADIOLOGY & ADDITIONAL TESTS:    Imaging Personally Reviewed:    Consultant(s) Notes Reviewed:      Care Discussed with Consultants/Other Providers:

## 2020-01-05 NOTE — CONSULT NOTE ADULT - ATTENDING COMMENTS
Full consult note as above; discussed with surgery resident and vascular fellow.   She is well known to me with PAD and renal failure. She had bilateral femoral to popliteal artery bypasses. She has a small wound in her LT 2nd toe. It appears consistent with a traumatic event which she does not recall any trauma to her toe. Her toe has some slight purple discoloration due to bruising or ecchymosis. There is no evidence of toe ischemia. A duplex scan noted the bypass to be patent. There was a stenosis on the LT side that will eventually require an angioplasty. She had an elevated potassium on several occasions and she should get a fistulagram to evaluate the fistula. It appeared to be more pulsatile than having a thrill.
Patient seen and case discussed with fellow Dr. Presley. Patient has type 1 diabetes with complications and is admitted for a toe fracture. Glycemic goals 100-200 as patient can have labile fingersticks. Continue with the basal/bolus doses stated above, consistent carbohydrate diet. Discharge: basal/bolus doses to be determined with Dr. Ley Endocrine follow up

## 2020-01-05 NOTE — PROGRESS NOTE ADULT - ASSESSMENT
63 y/o F  with PMHx of CHF, CAD, DMT1 on insulin, ACS, TIA, CVA, gout, PVD, ESRD on dialysis with multiple admissions  presents today with loss of left 2nd toenail with severe pain four days ago.  Pain gradually worsening. Reports that she woke up with her nail hanging off, has pain on her left 2nd toe. in er noticed with hyperglycemia as well as hyperkalemia with k 6.7 on admission     1- esrd  2- hyperkalemia  3- HTN   4- shpt   5- cad  6- DM     persistent hyperkalemia despite hd and clearances in range ?   avf eval. called Dr. Kurt fox 10 g po x 1   hd early am   cont with insulin regimen   cont hydralazine

## 2020-01-05 NOTE — CONSULT NOTE ADULT - PROBLEM SELECTOR RECOMMENDATION 9
Patient had low glucoses on humalog of 6.   -c/w lantus 13   -increase humalog to 4 units tidac   -c/w low dose sliding scale tidac and qhs   -if having snacks at bedtime then would add humalog 2 units at bedtime     discharge  basal/bolus. F/u Dr. Ley Patient had low glucoses on humalog of 6.   -c/w lantus 13   -increase humalog to 4 units tidac   -c/w low dose sliding scale tidac and qhs   -if having snacks at bedtime then would add humalog 2 units at bedtime   -hypoglycemia protocol    discharge  basal/bolus. F/u Dr. Ley

## 2020-01-05 NOTE — CONSULT NOTE ADULT - SUBJECTIVE AND OBJECTIVE BOX
HPI:  64F w/ ESRD on HD(M/T/Th/Sa), CAD s/p multiple stents/brachytherapy, mod MR, severe AS, RHF, CHF (EF 30% per last [facility]), DM1 c/b neuropathy and retinopathy, hx of TIA, severe peripheral artery disease s/p b/l fempop bypass c/b left thrombosed graft, left subclavian vein stenosis s/p stent, COPD not on O2, gout, hilar lymphadenopathy of unknown etiology presents with left toe fracture    Endocrine History:   Follows Dr. Ley. Hgb a1c of 8.7 on basaglar 13 and humalog (3-4 units TIDAC). Takes 3 units if not finishing her meal. FS typically 110 ACBK, AClunch 190-200, ACdinner 190-200, and bedtime low 200s. Endorses adherence to insulin. Has hypoglycemic event sometimes once a week, usually overnight. Patient said she follows a diabetic/renal diet at home.   Complications of retinopathy, ESRD, and peripheral neuropathy. Has CAD and PAD.       PAST MEDICAL & SURGICAL HISTORY:  COPD (chronic obstructive pulmonary disease)  Localized enlarged lymph nodes  CHF (congestive heart failure): EF 40-45%  Subclavian vein stenosis, left: s/p stent  DKA, type 1: 1/2015  ACS (acute coronary syndrome): 1/2015 - cath revealed 100% ostial stenosis not amenable to PCI - medical management  TIA (transient ischemic attack): x 2 - 8-9 years ago prior to ASD/VSD repair  CAD (coronary artery disease): s/p stents  Gout: past  CVA (cerebral infarction): with no residual, 8 yrs ago, prior to heart surgery - ST memory loss  Peripheral vascular disease: occluded left fem-pop bypass 5/2015  Diabetes mellitus type 1: Insulin Dependent -  ESRD (end stage renal disease): dialysis  M, tue, thursday, saturday  Hyperlipidemia  Status post device closure of ASD: &quot;clamshell&quot;  History of cardiac catheterization: 1/2015 - no intervention  S/P femoral-popliteal bypass surgery: L and R in 2013 with graft; 5/2015 CFA angioplasty left and ileofemoral endarterectomywith vein patch angioplasty of left fem-pop bypass graft  Multiple vascular surgery both leg, left fempop bypass revision 11/2015  AV (arteriovenous fistula): Left AV graft; revision with stent placement 2-3 years ago  S/P cholecystectomy      FAMILY HISTORY:  Family history of smoking  Family history of hypertension  Family history of cancer (Sibling)  No FH of diabetes     Social History: quit tobacco in 2000. No etoh or illicit drug use      Outpatient Medications: Home Medications:  acetaminophen 325 mg oral tablet: 2 tab(s) orally every 6 hours, As Needed - 3), Moderate Pain (4 - 6) (03 Jan 2020 02:28)  aspirin 81 mg oral delayed release tablet: 1 tab(s) orally once a day (03 Jan 2020 02:28)  atorvastatin 20 mg oral tablet: 1 tab(s) orally once a day (03 Jan 2020 02:28)  Basaglar KwikPen 100 units/mL subcutaneous solution: 12 unit(s) subcutaneous once a day (at bedtime) (03 Jan 2020 02:28)  budesonide 0.5 mg/2 mL inhalation suspension: 2 milliliter(s) inhaled 2 times a day (03 Jan 2020 02:28)  clopidogrel 75 mg oral tablet: 1 tab(s) orally once a day (03 Jan 2020 02:28)  HumaLOG KwikPen 100 units/mL injectable solution: 4 unit(s) subcutaneous 3 times a day - Family to adjust insulin levels based on PO intake and fingersticks as previously discussed with the Endocrinology Team (03 Jan 2020 02:28)  hydrALAZINE 25 mg oral tablet: 2 tab(s) orally once a day (AM)  4 tab(s) orally once a day (PM)    NOTE: Rx dispensed as 2tabs 3times a day (03 Jan 2020 02:28)  ipratropium-albuterol 0.5 mg-2.5 mg/3 mLinhalation solution: 3 milliliter(s) inhaled every 6 hours (03 Jan 2020 02:28)  lisinopril 40 mg oral tablet: 1 tab(s) orally once a day (03 Jan 2020 02:28)  metoprolol succinate 50 mg oral tablet, extended release: 1 tab(s) orally once a day (03 Jan 2020 02:28)  pantoprazole 40 mg oral delayed release tablet: 1 tab(s) orally once a day (03 Jan 2020 02:28)  Percocet 5/325 oral tablet: 1 tab(s) orally 2 times a day, As Needed (03 Jan 2020 02:28)  sevelamer carbonate 800 mg oral tablet: 1 tab(s) orally 3 times a day (with meals) (03 Jan 2020 02:28)      MEDICATIONS  (STANDING):  ALBUTerol    90 MICROgram(s) HFA Inhaler 2 Puff(s) Inhalation every 6 hours  aspirin enteric coated 81 milliGRAM(s) Oral daily  atorvastatin 20 milliGRAM(s) Oral at bedtime  BACItracin   Ointment 1 Application(s) Topical daily  buDESOnide    Inhalation Suspension 0.5 milliGRAM(s) Inhalation two times a day  clopidogrel Tablet 75 milliGRAM(s) Oral daily  dextrose 5%. 1000 milliLiter(s) (50 mL/Hr) IV Continuous <Continuous>  dextrose 50% Injectable 12.5 Gram(s) IV Push once  dextrose 50% Injectable 25 Gram(s) IV Push once  dextrose 50% Injectable 25 Gram(s) IV Push once  heparin  Injectable 5000 Unit(s) SubCutaneous every 8 hours  hydrALAZINE 50 milliGRAM(s) Oral every 8 hours  insulin glargine Injectable (LANTUS) 13 Unit(s) SubCutaneous at bedtime  insulin lispro (HumaLOG) corrective regimen sliding scale   SubCutaneous three times a day before meals  insulin lispro (HumaLOG) corrective regimen sliding scale   SubCutaneous at bedtime  insulin lispro Injectable (HumaLOG) 3 Unit(s) SubCutaneous three times a day before meals  metoprolol succinate ER 50 milliGRAM(s) Oral daily  pantoprazole    Tablet 40 milliGRAM(s) Oral before breakfast  sevelamer carbonate 2400 milliGRAM(s) Oral three times a day    MEDICATIONS  (PRN):  acetaminophen   Tablet .. 650 milliGRAM(s) Oral every 6 hours PRN Mild Pain (1 - 3), Moderate Pain (4 - 6)  dextrose 40% Gel 15 Gram(s) Oral once PRN Blood Glucose LESS THAN 70 milliGRAM(s)/deciliter  glucagon  Injectable 1 milliGRAM(s) IntraMuscular once PRN Glucose LESS THAN 70 milligrams/deciliter      Allergies    No Known Allergies    Intolerances      Review of Systems:  Constitutional: No fever, chills   Neuro: No tremors, headache   Cardiovascular: No chest pain, palpitations  Respiratory: No SOB, no cough  GI: No nausea, vomiting, abdominal pain  : No dysuria, polyuria   Skin: no rash, ulcers   Psych: no depression, anxiety   Endocrine: no polyphagia, polydipsia     ALL OTHER SYSTEMS REVIEWED AND NEGATIV      PHYSICAL EXAM:  VITALS: T(C): 36.4 (01-05-20 @ 11:15)  T(F): 97.5 (01-05-20 @ 11:15), Max: 98.3 (01-04-20 @ 17:26)  HR: 68 (01-05-20 @ 11:15) (68 - 99)  BP: 97/52 (01-05-20 @ 11:15) (97/52 - 108/55)  RR:  (18 - 19)  SpO2:  (99% - 100%)  Wt(kg): --  GENERAL: NAD, well-groomed, well-developed  EYES: No proptosis, anicteric  HEENT:  Atraumatic, Normocephalic, moist mucous membranes  RESPIRATORY: Clear to auscultation bilaterally; No rales, rhonchi, wheezing  CARDIOVASCULAR: Regular rate and rhythm; No murmurs; no peripheral edema  GI: Soft, nontender, non distended, normal bowel sounds  SKIN: Dry, intact, No rashes or lesions  Extremities: Left foot brace in place. Right foot no ulcers or rashes.   NEURO: AOx3, moves all extremities spontaneously   PSYCH: Reactive affect, euthymic mood    POCT Blood Glucose.: 187 mg/dL (01-05-20 @ 11:40)  POCT Blood Glucose.: 312 mg/dL (01-05-20 @ 07:53)  POCT Blood Glucose.: 258 mg/dL (01-04-20 @ 22:22)  POCT Blood Glucose.: 249 mg/dL (01-04-20 @ 21:30)  POCT Blood Glucose.: 161 mg/dL (01-04-20 @ 17:45)  POCT Blood Glucose.: 160 mg/dL (01-04-20 @ 11:54)  POCT Blood Glucose.: 195 mg/dL (01-04-20 @ 07:54)  POCT Blood Glucose.: 385 mg/dL (01-03-20 @ 21:08)  POCT Blood Glucose.: 118 mg/dL (01-03-20 @ 18:01)  POCT Blood Glucose.: 81 mg/dL (01-03-20 @ 17:42)  POCT Blood Glucose.: 54 mg/dL (01-03-20 @ 17:21)  POCT Blood Glucose.: 49 mg/dL (01-03-20 @ 17:02)  POCT Blood Glucose.: 48 mg/dL (01-03-20 @ 16:59)  POCT Blood Glucose.: 94 mg/dL (01-03-20 @ 12:19)  POCT Blood Glucose.: 274 mg/dL (01-03-20 @ 08:30)  POCT Blood Glucose.: 209 mg/dL (01-03-20 @ 05:50)  POCT Blood Glucose.: 225 mg/dL (01-03-20 @ 00:17)  POCT Blood Glucose.: 305 mg/dL (01-02-20 @ 22:33)  POCT Blood Glucose.: 360 mg/dL (01-02-20 @ 21:30)  POCT Blood Glucose.: 297 mg/dL (01-02-20 @ 20:47)                            11.2   5.10  )-----------( 185      ( 05 Jan 2020 08:59 )             36.6       01-05    127<L>  |  90<L>  |  19  ----------------------------<  408<H>  5.6<H>   |  23  |  4.73<H>    EGFR if : 11<L>  EGFR if non : 9<L>    Ca    9.2      01-05  Mg     2.5     01-04  Phos  5.7     01-04    TPro  6.8  /  Alb  3.9  /  TBili  0.4  /  DBili  x   /  AST  11  /  ALT  10  /  AlkPhos  135<H>  01-03      Thyroid Function Tests:      Hemoglobin A1C, Whole Blood: 8.7 % <H> [4.0 - 5.6] (11-13-19 @ 23:44)            Radiology: Endocrine History:   Patient is a 65 yo woman with type 1 diabetes and macrovascular complications as well as microvascular complications who is admitted for a toe fracture  Follows Dr. Ley. Hgb a1c of 8.7 on basaglar 13 and humalog (3-4 units TIDAC). Takes 3 units if not finishing her meal. FS typically 110 ACBK, AClunch 190-200, ACdinner 190-200, and bedtime low 200s. Endorses adherence to insulin. Has hypoglycemic event sometimes once a week, usually overnight. Patient said she follows a diabetic/renal diet at home.   Complications of retinopathy, ESRD, and peripheral neuropathy. Has CAD and PAD.     PAST MEDICAL & SURGICAL HISTORY:  COPD (chronic obstructive pulmonary disease)  Localized enlarged lymph nodes  CHF (congestive heart failure): EF 40-45%  Subclavian vein stenosis, left: s/p stent  DKA, type 1: 1/2015  ACS (acute coronary syndrome): 1/2015 - cath revealed 100% ostial stenosis not amenable to PCI - medical management  TIA (transient ischemic attack): x 2 - 8-9 years ago prior to ASD/VSD repair  CAD (coronary artery disease): s/p stents  Gout: past  CVA (cerebral infarction): with no residual, 8 yrs ago, prior to heart surgery - ST memory loss  Peripheral vascular disease: occluded left fem-pop bypass 5/2015  Diabetes mellitus type 1: Insulin Dependent -  ESRD (end stage renal disease): dialysis  M, tue, thursday, saturday  Hyperlipidemia  Status post device closure of ASD: &quot;clamshell&quot;  History of cardiac catheterization: 1/2015 - no intervention  S/P femoral-popliteal bypass surgery: L and R in 2013 with graft; 5/2015 CFA angioplasty left and ileofemoral endarterectomywith vein patch angioplasty of left fem-pop bypass graft  Multiple vascular surgery both leg, left fempop bypass revision 11/2015  AV (arteriovenous fistula): Left AV graft; revision with stent placement 2-3 years ago  S/P cholecystectomy    FAMILY HISTORY:  Family history of smoking  Family history of hypertension  Family history of cancer (Sibling)  No FH of diabetes   Social History: quit tobacco in 2000. No etoh or illicit drug use      Outpatient Medications: Home Medications:  acetaminophen 325 mg oral tablet: 2 tab(s) orally every 6 hours, As Needed - 3), Moderate Pain (4 - 6) (03 Jan 2020 02:28)  aspirin 81 mg oral delayed release tablet: 1 tab(s) orally once a day (03 Jan 2020 02:28)  atorvastatin 20 mg oral tablet: 1 tab(s) orally once a day (03 Jan 2020 02:28)  Basaglar KwikPen 100 units/mL subcutaneous solution: 12 unit(s) subcutaneous once a day (at bedtime) (03 Jan 2020 02:28)  budesonide 0.5 mg/2 mL inhalation suspension: 2 milliliter(s) inhaled 2 times a day (03 Jan 2020 02:28)  clopidogrel 75 mg oral tablet: 1 tab(s) orally once a day (03 Jan 2020 02:28)  HumaLOG KwikPen 100 units/mL injectable solution: 4 unit(s) subcutaneous 3 times a day - Family to adjust insulin levels based on PO intake and fingersticks as previously discussed with the Endocrinology Team (03 Jan 2020 02:28)  hydrALAZINE 25 mg oral tablet: 2 tab(s) orally once a day (AM)  4 tab(s) orally once a day (PM)    NOTE: Rx dispensed as 2tabs 3times a day (03 Jan 2020 02:28)  ipratropium-albuterol 0.5 mg-2.5 mg/3 mLinhalation solution: 3 milliliter(s) inhaled every 6 hours (03 Jan 2020 02:28)  lisinopril 40 mg oral tablet: 1 tab(s) orally once a day (03 Jan 2020 02:28)  metoprolol succinate 50 mg oral tablet, extended release: 1 tab(s) orally once a day (03 Jan 2020 02:28)  pantoprazole 40 mg oral delayed release tablet: 1 tab(s) orally once a day (03 Jan 2020 02:28)  Percocet 5/325 oral tablet: 1 tab(s) orally 2 times a day, As Needed (03 Jan 2020 02:28)  sevelamer carbonate 800 mg oral tablet: 1 tab(s) orally 3 times a day (with meals) (03 Jan 2020 02:28)    MEDICATIONS  (STANDING):  ALBUTerol    90 MICROgram(s) HFA Inhaler 2 Puff(s) Inhalation every 6 hours  aspirin enteric coated 81 milliGRAM(s) Oral daily  atorvastatin 20 milliGRAM(s) Oral at bedtime  BACItracin   Ointment 1 Application(s) Topical daily  buDESOnide    Inhalation Suspension 0.5 milliGRAM(s) Inhalation two times a day  clopidogrel Tablet 75 milliGRAM(s) Oral daily  dextrose 5%. 1000 milliLiter(s) (50 mL/Hr) IV Continuous <Continuous>  dextrose 50% Injectable 12.5 Gram(s) IV Push once  dextrose 50% Injectable 25 Gram(s) IV Push once  dextrose 50% Injectable 25 Gram(s) IV Push once  heparin  Injectable 5000 Unit(s) SubCutaneous every 8 hours  hydrALAZINE 50 milliGRAM(s) Oral every 8 hours  insulin glargine Injectable (LANTUS) 13 Unit(s) SubCutaneous at bedtime  insulin lispro (HumaLOG) corrective regimen sliding scale   SubCutaneous three times a day before meals  insulin lispro (HumaLOG) corrective regimen sliding scale   SubCutaneous at bedtime  insulin lispro Injectable (HumaLOG) 3 Unit(s) SubCutaneous three times a day before meals  metoprolol succinate ER 50 milliGRAM(s) Oral daily  pantoprazole    Tablet 40 milliGRAM(s) Oral before breakfast  sevelamer carbonate 2400 milliGRAM(s) Oral three times a day    MEDICATIONS  (PRN):  acetaminophen   Tablet .. 650 milliGRAM(s) Oral every 6 hours PRN Mild Pain (1 - 3), Moderate Pain (4 - 6)  dextrose 40% Gel 15 Gram(s) Oral once PRN Blood Glucose LESS THAN 70 milliGRAM(s)/deciliter  glucagon  Injectable 1 milliGRAM(s) IntraMuscular once PRN Glucose LESS THAN 70 milligrams/deciliter    Allergies  No Known Allergies    Review of Systems:  Constitutional: No fever, chills   Neuro: No tremors, headache   Cardiovascular: No chest pain, palpitations  Respiratory: No SOB, no cough  GI: No nausea, vomiting, abdominal pain  : No dysuria, polyuria   Skin: no rash, ulcers   Psych: no depression, anxiety   Endocrine: no polyphagia, polydipsia   ALL OTHER SYSTEMS REVIEWED AND NEGATIV      PHYSICAL EXAM:  VITALS: T(C): 36.4 (01-05-20 @ 11:15)  T(F): 97.5 (01-05-20 @ 11:15), Max: 98.3 (01-04-20 @ 17:26)  HR: 68 (01-05-20 @ 11:15) (68 - 99)  BP: 97/52 (01-05-20 @ 11:15) (97/52 - 108/55)  RR:  (18 - 19)  SpO2:  (99% - 100%)  Wt(kg): --  GENERAL: NAD, well-groomed, well-developed  HEENT:  Atraumatic, Normocephalic, moist mucous membranes; adentulous  RESPIRATORY: Clear to auscultation bilaterally; No rales, rhonchi, wheezing  CARDIOVASCULAR: Regular rate and rhythm; No murmurs; no peripheral edema  GI: Soft, nontender, non distended, normal bowel sounds  SKIN: Dry, intact, No rashes or lesions  Extremities: Left foot brace in place. Right foot no ulcers or rashes.   NEURO: AOx3, moves all extremities spontaneously   PSYCH: Reactive affect, euthymic mood    POCT Blood Glucose.: 187 mg/dL (01-05-20 @ 11:40)  POCT Blood Glucose.: 312 mg/dL (01-05-20 @ 07:53)  POCT Blood Glucose.: 258 mg/dL (01-04-20 @ 22:22)  POCT Blood Glucose.: 249 mg/dL (01-04-20 @ 21:30)  POCT Blood Glucose.: 161 mg/dL (01-04-20 @ 17:45)  POCT Blood Glucose.: 160 mg/dL (01-04-20 @ 11:54)  POCT Blood Glucose.: 195 mg/dL (01-04-20 @ 07:54)  POCT Blood Glucose.: 385 mg/dL (01-03-20 @ 21:08)  POCT Blood Glucose.: 118 mg/dL (01-03-20 @ 18:01)  POCT Blood Glucose.: 81 mg/dL (01-03-20 @ 17:42)  POCT Blood Glucose.: 54 mg/dL (01-03-20 @ 17:21)  POCT Blood Glucose.: 49 mg/dL (01-03-20 @ 17:02)  POCT Blood Glucose.: 48 mg/dL (01-03-20 @ 16:59)  POCT Blood Glucose.: 94 mg/dL (01-03-20 @ 12:19)  POCT Blood Glucose.: 274 mg/dL (01-03-20 @ 08:30)  POCT Blood Glucose.: 209 mg/dL (01-03-20 @ 05:50)  POCT Blood Glucose.: 225 mg/dL (01-03-20 @ 00:17)  POCT Blood Glucose.: 305 mg/dL (01-02-20 @ 22:33)  POCT Blood Glucose.: 360 mg/dL (01-02-20 @ 21:30)  POCT Blood Glucose.: 297 mg/dL (01-02-20 @ 20:47)                          11.2   5.10  )-----------( 185      ( 05 Jan 2020 08:59 )             36.6       01-05    127<L>  |  90<L>  |  19  ----------------------------<  408<H>  5.6<H>   |  23  |  4.73<H>    EGFR if : 11<L>  EGFR if non : 9<L>    Ca    9.2      01-05  Mg     2.5     01-04  Phos  5.7     01-04    TPro  6.8  /  Alb  3.9  /  TBili  0.4  /  DBili  x   /  AST  11  /  ALT  10  /  AlkPhos  135<H>  01-03  Hemoglobin A1C, Whole Blood: 8.7 % <H> [4.0 - 5.6] (11-13-19 @ 23:44)

## 2020-01-05 NOTE — PROGRESS NOTE ADULT - SUBJECTIVE AND OBJECTIVE BOX
[facility]   [phone]  RENAL FOLLOW UP NOTE  --------------------------------------------------------------------------------  Chief Complaint:    24 hour events/subjective:    c/o toe pain     PAST HISTORY  --------------------------------------------------------------------------------  No significant changes to PMH, PSH, FHx, SHx, unless otherwise noted    ALLERGIES & MEDICATIONS  --------------------------------------------------------------------------------  Allergies    No Known Allergies    Intolerances      Standing Inpatient Medications  ALBUTerol    90 MICROgram(s) HFA Inhaler 2 Puff(s) Inhalation every 6 hours  aspirin enteric coated 81 milliGRAM(s) Oral daily  atorvastatin 20 milliGRAM(s) Oral at bedtime  BACItracin   Ointment 1 Application(s) Topical daily  buDESOnide    Inhalation Suspension 0.5 milliGRAM(s) Inhalation two times a day  clopidogrel Tablet 75 milliGRAM(s) Oral daily  dextrose 5%. 1000 milliLiter(s) IV Continuous <Continuous>  dextrose 50% Injectable 12.5 Gram(s) IV Push once  dextrose 50% Injectable 25 Gram(s) IV Push once  dextrose 50% Injectable 25 Gram(s) IV Push once  heparin  Injectable 5000 Unit(s) SubCutaneous every 8 hours  hydrALAZINE 50 milliGRAM(s) Oral every 8 hours  insulin glargine Injectable (LANTUS) 13 Unit(s) SubCutaneous at bedtime  insulin lispro (HumaLOG) corrective regimen sliding scale   SubCutaneous three times a day before meals  insulin lispro (HumaLOG) corrective regimen sliding scale   SubCutaneous at bedtime  insulin lispro Injectable (HumaLOG) 4 Unit(s) SubCutaneous three times a day before meals  metoprolol succinate ER 50 milliGRAM(s) Oral daily  pantoprazole    Tablet 40 milliGRAM(s) Oral before breakfast  sevelamer carbonate 2400 milliGRAM(s) Oral three times a day    PRN Inpatient Medications  acetaminophen   Tablet .. 650 milliGRAM(s) Oral every 6 hours PRN  dextrose 40% Gel 15 Gram(s) Oral once PRN  glucagon  Injectable 1 milliGRAM(s) IntraMuscular once PRN      REVIEW OF SYSTEMS  --------------------------------------------------------------------------------    Gen: denies, fevers/chills,  CVS: denies chest pain/palpitations  Resp: denies SOB/Cough  GI: Denies N/V/Abd pain  : Denies dysuria    All other systems were reviewed and are negative, except as noted.    VITALS/PHYSICAL EXAM  --------------------------------------------------------------------------------  T(C): 36.4 (01-05-20 @ 11:15), Max: 36.8 (01-04-20 @ 20:41)  HR: 68 (01-05-20 @ 11:15) (68 - 99)  BP: 97/52 (01-05-20 @ 11:15) (97/52 - 107/68)  RR: 18 (01-05-20 @ 11:15) (18 - 18)  SpO2: 100% (01-05-20 @ 11:15) (99% - 100%)  Wt(kg): --        01-04-20 @ 07:01 - 01-05-20 @ 07:00  --------------------------------------------------------  IN: 220 mL / OUT: 0 mL / NET: 220 mL    01-05-20 @ 07:01  -  01-05-20 @ 19:35  --------------------------------------------------------  IN: 400 mL / OUT: 0 mL / NET: 400 mL      Physical Exam:  	    Gen: Non toxic but in pain from left 2nd toe   	no jvd ,  	Pulm: decrease bs  no rales or ronchi or wheezing  	CV: RRR, S1S2; no rub  	Abd: +BS, soft, nontender/nondistended  	: No suprapubic tenderness  	UE: Warm, no cyanosis  no clubbing,  no edema  	LE: Warm, no cyanosis  no clubbing, LLE 1-2-  edema  	Neuro: alert and oriented. speech coherent   	Skin: Warm, no decrease skin turgor  left 2nd toe mild erythema and discoloration with missing nail as well   	Vascular access: LUE + AVF + thrill and bruit   	  	  LABS/STUDIES  --------------------------------------------------------------------------------              11.2   5.10  >-----------<  185      [01-05-20 @ 08:59]              36.6     127  |  90  |  19  ----------------------------<  408      [01-05-20 @ 06:22]  5.6   |  23  |  4.73        Ca     9.2     [01-05-20 @ 06:22]      Mg     2.5     [01-04-20 @ 06:20]      Phos  5.7     [01-04-20 @ 06:20]            Creatinine Trend:  SCr 4.73 [01-05 @ 06:22]  SCr 6.03 [01-04 @ 06:20]  SCr 3.75 [01-03 @ 06:34]  SCr 6.03 [01-03 @ 01:17]  SCr 5.95 [01-02 @ 20:30]                  Iron 67, TIBC 234, %sat 29      [12-04-19 @ 08:38]  Ferritin 1021      [12-04-19 @ 08:40]  PTH -- (Ca 8.3)      [12-04-19 @ 08:38]   952  PTH -- (Ca 9.6)      [06-17-19 @ 18:06]   177  PTH -- (Ca 7.8)      [03-29-19 @ 20:38]   73  PTH -- (Ca 7.3)      [02-22-19 @ 02:49]   59  HbA1c 8.7      [11-13-19 @ 23:44]  TSH 1.78      [11-14-19 @ 09:15]  Lipid: chol 108, TG 76, HDL 57, LDL 36      [11-14-19 @ 09:16]

## 2020-01-05 NOTE — PROGRESS NOTE ADULT - ASSESSMENT
Assessment:  · Assessment		  64F w/ ESRD on HD(M/T/Th/Sa), CAD s/p multiple stents/brachytherapy, mod MR, severe AS, RHF, CHF (EF 30% per last [facility]), DM1 c/b neuropathy and retinopathy, hx of TIA, severe peripheral artery disease s/p b/l fempop bypass c/b left thrombosed graft, left subclavian vein stenosis s/p stent, COPD not on O2, gout, hilar lymphadenopathy of unknown etiology, recent hospitalization for pneumonia d/c 12/9/19 presents with left toe pain and xray appearing c/w Left 2nd and 4th toe fracture incidentally found to have hyperkalemia despite HD on day of admit and therefore admitted for urgent HD w/ podiatry eval of left foot.    Hyperkalemia.   - in patient with ESRD despite HD  - nephrology follow  - received regular insulin in ED and dosed lantus 6U per orders  - f/u BMP in am to monitor, trending down on 2nd bmp  - hold lisinopril.     Closed nondisplaced fracture of distal phalanx of lesser toe of left foot, initial encounter.   - podiatry follow  - noted 2nd toe nail avulsion as well. per podiatry c/w bacitracin  - 2nd and 4th digit appear to have fracture  - given no leukocytosis and ESR on admit would monitor off antibiotics unless indicated by podiatry.   - vascular evaluation noted  - LE Doppler pending     Chronic obstructive pulmonary disease, unspecified COPD type.   - hx of COPD not on home Oxygen    ESRD (end stage renal disease).  - HD per nephrology.     Peripheral artery disease.   - c/w asa and plavix.   - vascular evaluation    Type 1 diabetes mellitus with hyperglycemia.   - endocrinology evaluation  - continue w/ home regimen for now lantus 13U and humalog 6U TID premeal w/ ISS.    Chronic systolic congestive heart failure.   - HOLD lisinopril given hyperkalemia  - continue with hydralazine 50 TID per nephrology with holding parameters    Coronary artery disease involving native coronary artery of native heart without angina pectoris.   - c/w asa, clopidogrel, statin    Pierce Viera MD pager [phone]

## 2020-01-05 NOTE — CONSULT NOTE ADULT - ASSESSMENT
Patient is a 64 year old F with an extensive vascular history who presents with left toe pain after waking up suddenly with a dislodged second toe nail with toe pain.     PLAN:  - No emergent surgical intervention warranted  - Patient will likely need a formal angiogram  - Cares per primary team  - Discussed with Vasc Surg fellow, Dr. NOEL Artis  - Plan to be discussed with Attending, Dr. JOSH Elizalde    Please contact Surgery - Vascular (#[phone]) with any questions or concerns. Patient is a 64 year old F with an extensive vascular history who presents with left toe pain after waking up suddenly with a dislodged second toe nail with toe pain.     PLAN:  - No emergent surgical intervention warranted  - Please obtain BL LE duplex to evaluate bypass graft - ordered  - Cares per primary team  - Discussed with attending Dr. Elizalde    Please contact Surgery - Vascular (#[phone]) with any questions or concerns.

## 2020-01-06 LAB
ANION GAP SERPL CALC-SCNC: 16 MMOL/L — SIGNIFICANT CHANGE UP (ref 5–17)
BUN SERPL-MCNC: 35 MG/DL — HIGH (ref 7–23)
CALCIUM SERPL-MCNC: 9.4 MG/DL — SIGNIFICANT CHANGE UP (ref 8.4–10.5)
CHLORIDE SERPL-SCNC: 86 MMOL/L — LOW (ref 96–108)
CO2 SERPL-SCNC: 27 MMOL/L — SIGNIFICANT CHANGE UP (ref 22–31)
CREAT SERPL-MCNC: 7.08 MG/DL — HIGH (ref 0.5–1.3)
GLUCOSE BLDC GLUCOMTR-MCNC: 104 MG/DL — HIGH (ref 70–99)
GLUCOSE BLDC GLUCOMTR-MCNC: 130 MG/DL — HIGH (ref 70–99)
GLUCOSE BLDC GLUCOMTR-MCNC: 139 MG/DL — HIGH (ref 70–99)
GLUCOSE BLDC GLUCOMTR-MCNC: 267 MG/DL — HIGH (ref 70–99)
GLUCOSE SERPL-MCNC: 310 MG/DL — HIGH (ref 70–99)
HCT VFR BLD CALC: 35.5 % — SIGNIFICANT CHANGE UP (ref 34.5–45)
HGB BLD-MCNC: 11.3 G/DL — LOW (ref 11.5–15.5)
MCHC RBC-ENTMCNC: 31.8 GM/DL — LOW (ref 32–36)
MCHC RBC-ENTMCNC: 32.4 PG — SIGNIFICANT CHANGE UP (ref 27–34)
MCV RBC AUTO: 101.7 FL — HIGH (ref 80–100)
PLATELET # BLD AUTO: 181 K/UL — SIGNIFICANT CHANGE UP (ref 150–400)
POTASSIUM SERPL-MCNC: 4.6 MMOL/L — SIGNIFICANT CHANGE UP (ref 3.5–5.3)
POTASSIUM SERPL-SCNC: 4.6 MMOL/L — SIGNIFICANT CHANGE UP (ref 3.5–5.3)
RBC # BLD: 3.49 M/UL — LOW (ref 3.8–5.2)
RBC # FLD: 13.8 % — SIGNIFICANT CHANGE UP (ref 10.3–14.5)
SODIUM SERPL-SCNC: 129 MMOL/L — LOW (ref 135–145)
WBC # BLD: 7.38 K/UL — SIGNIFICANT CHANGE UP (ref 3.8–10.5)
WBC # FLD AUTO: 7.38 K/UL — SIGNIFICANT CHANGE UP (ref 3.8–10.5)

## 2020-01-06 PROCEDURE — 99232 SBSQ HOSP IP/OBS MODERATE 35: CPT

## 2020-01-06 PROCEDURE — 93925 LOWER EXTREMITY STUDY: CPT | Mod: 26

## 2020-01-06 RX ORDER — OXYCODONE HYDROCHLORIDE 5 MG/1
2.5 TABLET ORAL ONCE
Refills: 0 | Status: DISCONTINUED | OUTPATIENT
Start: 2020-01-06 | End: 2020-01-06

## 2020-01-06 RX ORDER — INSULIN HUMAN 100 [IU]/ML
2 INJECTION, SOLUTION SUBCUTANEOUS AT BEDTIME
Refills: 0 | Status: DISCONTINUED | OUTPATIENT
Start: 2020-01-06 | End: 2020-01-07

## 2020-01-06 RX ORDER — INSULIN LISPRO 100/ML
VIAL (ML) SUBCUTANEOUS
Refills: 0 | Status: DISCONTINUED | OUTPATIENT
Start: 2020-01-06 | End: 2020-01-10

## 2020-01-06 RX ADMIN — Medication 0: at 17:40

## 2020-01-06 RX ADMIN — Medication 0.5 MILLIGRAM(S): at 05:06

## 2020-01-06 RX ADMIN — ATORVASTATIN CALCIUM 20 MILLIGRAM(S): 80 TABLET, FILM COATED ORAL at 23:06

## 2020-01-06 RX ADMIN — SEVELAMER CARBONATE 2400 MILLIGRAM(S): 2400 POWDER, FOR SUSPENSION ORAL at 17:42

## 2020-01-06 RX ADMIN — Medication 1 APPLICATION(S): at 14:30

## 2020-01-06 RX ADMIN — Medication 4 UNIT(S): at 17:40

## 2020-01-06 RX ADMIN — Medication 81 MILLIGRAM(S): at 14:24

## 2020-01-06 RX ADMIN — INSULIN GLARGINE 13 UNIT(S): 100 INJECTION, SOLUTION SUBCUTANEOUS at 23:06

## 2020-01-06 RX ADMIN — Medication 0.5 MILLIGRAM(S): at 17:42

## 2020-01-06 RX ADMIN — Medication 4 UNIT(S): at 14:28

## 2020-01-06 RX ADMIN — ALBUTEROL 2 PUFF(S): 90 AEROSOL, METERED ORAL at 17:42

## 2020-01-06 RX ADMIN — OXYCODONE HYDROCHLORIDE 2.5 MILLIGRAM(S): 5 TABLET ORAL at 22:00

## 2020-01-06 RX ADMIN — SEVELAMER CARBONATE 2400 MILLIGRAM(S): 2400 POWDER, FOR SUSPENSION ORAL at 08:50

## 2020-01-06 RX ADMIN — Medication 3: at 08:48

## 2020-01-06 RX ADMIN — Medication 4 UNIT(S): at 08:49

## 2020-01-06 RX ADMIN — OXYCODONE HYDROCHLORIDE 2.5 MILLIGRAM(S): 5 TABLET ORAL at 21:43

## 2020-01-06 RX ADMIN — ALBUTEROL 2 PUFF(S): 90 AEROSOL, METERED ORAL at 14:25

## 2020-01-06 RX ADMIN — PANTOPRAZOLE SODIUM 40 MILLIGRAM(S): 20 TABLET, DELAYED RELEASE ORAL at 05:06

## 2020-01-06 RX ADMIN — SEVELAMER CARBONATE 2400 MILLIGRAM(S): 2400 POWDER, FOR SUSPENSION ORAL at 14:25

## 2020-01-06 RX ADMIN — ALBUTEROL 2 PUFF(S): 90 AEROSOL, METERED ORAL at 05:06

## 2020-01-06 RX ADMIN — CLOPIDOGREL BISULFATE 75 MILLIGRAM(S): 75 TABLET, FILM COATED ORAL at 14:24

## 2020-01-06 NOTE — PROGRESS NOTE ADULT - ASSESSMENT
63y/o F well known to diabetes team from frequent admissions with SOB/CP. Pt w/h/o uncontrolled TIDM c/b neuropathy and retinopathy as well as CAD s/p multiple stents, CHF (EF 30%), TIA, PVD s/p b/l fem-pop bypass, ESRD> HD. Recent admission with PNA. Here L foot 2nd digit pain w/ progressive swelling. Awaiting doppler. Also positive fx of 4th L toe. Tolerating POs with glycemic control not at goal specially persistent fasting hyperglycemia > denies eating last night. No hypoglycemia.  Pt doesn't participate on self DM care and family manages DM when at home. Pt reports no SOB. Has never followed with podiatrist. Will add bedtime Humalog in case pt eats as recommended yesterday. Will also start testing BG at 2am with low dose adjusted Humalog correction scale. Pt made aware to take Humalog at night if eating snack> she verbalizes understanding.

## 2020-01-06 NOTE — PROGRESS NOTE ADULT - PROBLEM SELECTOR PLAN 1
Plan: -test BG AC/HS/2AM  -C/W Lantus 13 units QHS. Slow titration due to ESRD and insulin sensitivity  -change Humalog to 4 units AC meals for now (hold if not eating)  -Start Humalog 2 units w/HS snack if eating. Hold if not eating  -c/w Humalog low correction scale (correct >200mg/dl) AC and Low HS scale. c/w custom 2AM scale  inform endocrine of hypoglycemia or persistent hyperglycemia episodes as changes in pts insulin regimen will need to be made.   -Notify endocrine if any plans to be NPO/diet changes as this will also affect insulin regimen  DC: basal bolus. final doses TBD  -Pt to f/u with Dr Ley,  pvt endo as out pt.  -Plan discussed with pt/team.  Contact info: [phone] (24/7). pager [phone].

## 2020-01-06 NOTE — PROGRESS NOTE ADULT - SUBJECTIVE AND OBJECTIVE BOX
Patient is a 64y old  Female who presents with a chief complaint of left foot 2nd digit pain w/ progressive swelling.    HPI: 63 y/o F  with PMHx of CHF, CAD, DMT1 on insulin, ACS, TIA, CVA, gout, PVD, ESRD on dialysis presents today with loss of left 2nd toenail with severe pain four days ago.  Pain gradually worsening. Reports that she woke up with her nail hanging off, toe now turning black.  Bandaged by EMS, never seen podiatry in past.  No noted trauma. Took Percocet and Oxycodone.      PAST MEDICAL & SURGICAL HISTORY:  COPD (chronic obstructive pulmonary disease)  Localized enlarged lymph nodes  CHF (congestive heart failure): EF 40-45%  Subclavian vein stenosis, left: s/p stent  DKA, type 1: 1/2015  ACS (acute coronary syndrome): 1/2015 - cath revealed 100% ostial stenosis not amenable to PCI - medical management  TIA (transient ischemic attack): x 2 - 8-9 years ago prior to ASD/VSD repair  CAD (coronary artery disease): s/p stents  Gout: past  CVA (cerebral infarction): with no residual, 8 yrs ago, prior to heart surgery - ST memory loss  Peripheral vascular disease: occluded left fem-pop bypass 5/2015  Diabetes mellitus type 1: Insulin Dependent -  ESRD (end stage renal disease): dialysis  M, tue, thursday, saturday  Hyperlipidemia  Status post device closure of ASD: &quot;clamshell&quot;  History of cardiac catheterization: 1/2015 - no intervention  S/P femoral-popliteal bypass surgery: L and R in 2013 with graft; 5/2015 CFA angioplasty left and ileofemoral endarterectomywith vein patch angioplasty of left fem-pop bypass graft  Multiple vascular surgery both leg, left fempop bypass revision 11/2015  AV (arteriovenous fistula): Left AV graft; revision with stent placement 2-3 years ago  S/P cholecystectomy      MEDICATIONS  (STANDING):  dextrose 5%. 1000 milliLiter(s) (50 mL/Hr) IV Continuous <Continuous>  dextrose 50% Injectable 12.5 Gram(s) IV Push once  dextrose 50% Injectable 25 Gram(s) IV Push once  dextrose 50% Injectable 25 Gram(s) IV Push once  insulin lispro (HumaLOG) corrective regimen sliding scale   SubCutaneous three times a day before meals  insulin lispro (HumaLOG) corrective regimen sliding scale   SubCutaneous at bedtime    MEDICATIONS  (PRN):  dextrose 40% Gel 15 Gram(s) Oral once PRN Blood Glucose LESS THAN 70 milliGRAM(s)/deciliter  glucagon  Injectable 1 milliGRAM(s) IntraMuscular once PRN Glucose LESS THAN 70 milligrams/deciliter      Allergies    No Known Allergies    Intolerances        VITALS:    Vital Signs Last 24 Hrs  T(C): 36.2 (03 Jan 2020 00:50), Max: 37 (02 Jan 2020 21:30)  T(F): 97.2 (03 Jan 2020 00:50), Max: 98.6 (02 Jan 2020 21:30)  HR: 68 (03 Jan 2020 00:50) (65 - 71)  BP: 111/61 (03 Jan 2020 00:50) (106/42 - 148/63)  BP(mean): --  RR: 18 (03 Jan 2020 00:50) (15 - 20)  SpO2: 97% (03 Jan 2020 00:50) (97% - 100%)    LABS:                          13.0   5.24  )-----------( 209      ( 02 Jan 2020 20:30 )             43.1       01-02    134<L>  |  92<L>  |  25<H>  ----------------------------<  356<H>  6.7<HH>   |  25  |  5.95<H>    Ca    10.0      02 Jan 2020 20:30    TPro  7.9  /  Alb  4.5  /  TBili  0.3  /  DBili  x   /  AST  20  /  ALT  7<L>  /  AlkPhos  153<H>  01-02      CAPILLARY BLOOD GLUCOSE      POCT Blood Glucose.: 305 mg/dL (02 Jan 2020 22:33)  POCT Blood Glucose.: 360 mg/dL (02 Jan 2020 21:30)  POCT Blood Glucose.: 297 mg/dL (02 Jan 2020 20:47)      PT/INR - ( 02 Jan 2020 20:30 )   PT: 11.2 sec;   INR: 0.97 ratio         PTT - ( 02 Jan 2020 20:30 )  PTT:32.5 sec    LOWER EXTREMITY PHYSICAL EXAM:    Vascular: DP/PT non-palpable, B/L, CFT <3 seconds B/L, Temperature gradient warm to cool, B/L.   Neuro: Epicritic sensation absent to the level of the forefoot B/L.  Musculoskeletal/Ortho: left foot 2nd digit dactylitis, pain on light palpation of left distal forefoot  Skin: left foot 2nd digit s/p traumatic nail avulsion with dorsal nail bed wound, 2.0x2.0cm, depth to subq, wound bed >80% fibrotic, no active drainage, no malodor, etiology 2/2 trauma      RADIOLOGY & ADDITIONAL STUDIES:  < from: Xray Foot AP + Lateral + Oblique, Left (01.02.20 @ 22:16) >  ******PRELIMINARY REPORT******    ******PRELIMINARY REPORT******          EXAM:  FOOT COMPLETE LEFT (MIN 3 VIEWS)                            PROCEDURE DATE:  01/02/2020      ******PRELIMINARY REPORT******    ******PRELIMINARY REPORT******              INTERPRETATION:  Acute fracture involving the left fourth proximal phalangeal shaft. Cortical irregularity of the middle second phalanx with questionable lucency along the medial aspect, which likely represents an acute fracture but may be artifactual. Correlate with point tenderness.              ******PRELIMINARY REPORT******    ******PRELIMINARY REPORT******          HOLLEY KIRBY M.D., RADIOLOGY RESIDENT    < end of copied text >

## 2020-01-06 NOTE — PROGRESS NOTE ADULT - SUBJECTIVE AND OBJECTIVE BOX
DIABETES FOLLOW UP NOTE: Saw pt earlier today  INTERVAL HX: 65y/o F well known to diabetes team from frequent admissions with SOB/CP. Pt w/h/o uncontrolled TIDM (HbA1c 8.1% 9/19 now 8.7%). DM c/b neuropathy and retinopathy as well as CAD s/p multiple stents, CHF (EF 30%), TIA, PVD s/p b/l fem-pop bypass, ESRD> HD. Recent admission with PNA. Here L foot 2nd digit pain w/ progressive swelling. Pt states she woke up with her nail hanging off, toe now turning black. Awaiting doppler. Also positive fx of 4th L toe. Pt sleeping at time of visit but answered questions. Reports tolerating POs, doesn't know what is going to happen with her foot which still hurting. Glycemic control not at goal with BGs 100s to 300s in the last 24 hours. No glycemic pattern identified besides persistent fasting hyperglycemia> pt has h/o eating at night> demies eating last night.. No hypoglycemia.  Pt doesn't participate on self DM care and family manages DM when at home. Pt reports no SOB. Has never followed with podiatrist.      Review of Systems:  General: As above  Cardiovascular: No chest pain, palpitations  Respiratory: No SOB, no cough  GI: No nausea, vomiting, abdominal pain  Endocrine: no polyuria, polydipsia or S&Sx of hypoglycemia    Allergies    No Known Allergies    Intolerances      MEDICATIONS:  atorvastatin 20 milliGRAM(s) Oral at bedtime  insulin glargine Injectable (LANTUS) 13 Unit(s) SubCutaneous at bedtime  insulin lispro (HumaLOG) corrective regimen sliding scale   SubCutaneous three times a day before meals  insulin lispro (HumaLOG) corrective regimen sliding scale   SubCutaneous at bedtime  insulin lispro Injectable (HumaLOG) 4 Unit(s) SubCutaneous three times a day before meals    PHYSICAL EXAM:  VITALS: T(C): 37.1 (01-06-20 @ 11:59)  T(F): 98.8 (01-06-20 @ 11:59), Max: 98.8 (01-06-20 @ 11:59)  HR: 78 (01-06-20 @ 11:59) (78 - 78)  BP: 112/62 (01-06-20 @ 11:59) (112/62 - 136/77)  RR:  (18 - 19)  SpO2:  (95% - 99%)  Wt(kg): --  GENERAL:  Female laying in bed in NAD  Abdomen: Soft, nontender, non distended  Extremities: Warm, LLE edema (chronic) Noted dressing on L foot D&I  NEURO: A&O X3    LABS:  POCT Blood Glucose.: 130 mg/dL (01-06-20 @ 14:23)  POCT Blood Glucose.: 267 mg/dL (01-06-20 @ 08:10)  POCT Blood Glucose.: 310 mg/dL (01-05-20 @ 21:59)  POCT Blood Glucose.: 204 mg/dL (01-05-20 @ 17:21)  POCT Blood Glucose.: 187 mg/dL (01-05-20 @ 11:40)  POCT Blood Glucose.: 312 mg/dL (01-05-20 @ 07:53)  POCT Blood Glucose.: 258 mg/dL (01-04-20 @ 22:22)  POCT Blood Glucose.: 249 mg/dL (01-04-20 @ 21:30)  POCT Blood Glucose.: 161 mg/dL (01-04-20 @ 17:45)  POCT Blood Glucose.: 160 mg/dL (01-04-20 @ 11:54)  POCT Blood Glucose.: 195 mg/dL (01-04-20 @ 07:54)                          11.3   7.38  )-----------( 181      ( 06 Jan 2020 07:44 )             35.5       01-06    129<L>  |  86<L>  |  35<H>  ----------------------------<  310<H>  4.6   |  27  |  7.08<H>    EGFR if : 7<L>  EGFR if non : 6<L>    Ca    9.4      01-06  Mg     2.5     01-04  Phos  5.7     01-04      Hemoglobin A1C, Whole Blood: 8.7 % <H> [4.0 - 5.6] (11-13-19 @ 23:44)

## 2020-01-06 NOTE — PROGRESS NOTE ADULT - SUBJECTIVE AND OBJECTIVE BOX
Patient is a 64y old  Female who presents with a chief complaint of CC:64F p/w acute left toe pain (06 Jan 2020 15:17)      SUBJECTIVE / OVERNIGHT EVENTS: No new complaints.   Review of Systems  chest pain no  palpitations no  sob no  nausea no  headache no    MEDICATIONS  (STANDING):  ALBUTerol    90 MICROgram(s) HFA Inhaler 2 Puff(s) Inhalation every 6 hours  aspirin enteric coated 81 milliGRAM(s) Oral daily  atorvastatin 20 milliGRAM(s) Oral at bedtime  BACItracin   Ointment 1 Application(s) Topical daily  buDESOnide    Inhalation Suspension 0.5 milliGRAM(s) Inhalation two times a day  clopidogrel Tablet 75 milliGRAM(s) Oral daily  dextrose 5%. 1000 milliLiter(s) (50 mL/Hr) IV Continuous <Continuous>  dextrose 50% Injectable 12.5 Gram(s) IV Push once  dextrose 50% Injectable 25 Gram(s) IV Push once  dextrose 50% Injectable 25 Gram(s) IV Push once  heparin  Injectable 5000 Unit(s) SubCutaneous every 8 hours  hydrALAZINE 50 milliGRAM(s) Oral every 8 hours  insulin glargine Injectable (LANTUS) 13 Unit(s) SubCutaneous at bedtime  insulin lispro (HumaLOG) corrective regimen sliding scale   SubCutaneous three times a day before meals  insulin lispro (HumaLOG) corrective regimen sliding scale   SubCutaneous at bedtime  insulin lispro (HumaLOG) corrective regimen sliding scale   SubCutaneous <User Schedule>  insulin lispro Injectable (HumaLOG) 4 Unit(s) SubCutaneous three times a day before meals  insulin regular  human recombinant 2 Unit(s) SubCutaneous at bedtime  metoprolol succinate ER 50 milliGRAM(s) Oral daily  pantoprazole    Tablet 40 milliGRAM(s) Oral before breakfast  sevelamer carbonate 2400 milliGRAM(s) Oral three times a day    MEDICATIONS  (PRN):  acetaminophen   Tablet .. 650 milliGRAM(s) Oral every 6 hours PRN Mild Pain (1 - 3), Moderate Pain (4 - 6)  dextrose 40% Gel 15 Gram(s) Oral once PRN Blood Glucose LESS THAN 70 milliGRAM(s)/deciliter  glucagon  Injectable 1 milliGRAM(s) IntraMuscular once PRN Glucose LESS THAN 70 milligrams/deciliter      Vital Signs Last 24 Hrs  T(C): 36.5 (06 Jan 2020 18:32), Max: 37.1 (05 Jan 2020 20:56)  T(F): 97.7 (06 Jan 2020 18:32), Max: 98.8 (06 Jan 2020 11:59)  HR: 77 (06 Jan 2020 18:32) (77 - 86)  BP: 126/61 (06 Jan 2020 18:32) (112/62 - 155/77)  BP(mean): --  RR: 17 (06 Jan 2020 18:32) (17 - 19)  SpO2: 100% (06 Jan 2020 18:32) (95% - 100%)    PHYSICAL EXAM:  GENERAL: NAD  HEAD:  Atraumatic, Normocephalic  EYES: EOMI, PERRLA, conjunctiva and sclera clear  NECK: Supple, No JVD  CHEST/LUNG: Clear to auscultation bilaterally; No wheeze  HEART: Regular rate and rhythm; No murmurs, rubs, or gallops  ABDOMEN: Soft, Nontender, Nondistended; Bowel sounds present  EXTREMITIES:  2+ Peripheral Pulses, No clubbing, cyanosis, or edema  PSYCH: depressed  NEUROLOGY: non-focal  SKIN: No rashes or lesions    LABS:                        11.3   7.38  )-----------( 181      ( 06 Jan 2020 07:44 )             35.5     01-06    129<L>  |  86<L>  |  35<H>  ----------------------------<  310<H>  4.6   |  27  |  7.08<H>    Ca    9.4      06 Jan 2020 05:49                  RADIOLOGY & ADDITIONAL TESTS:    Imaging Personally Reviewed:    Consultant(s) Notes Reviewed:      Care Discussed with Consultants/Other Providers:

## 2020-01-06 NOTE — PROGRESS NOTE ADULT - ASSESSMENT
Assessment:  · Assessment		  64F w/ ESRD on HD(M/T/Th/Sa), CAD s/p multiple stents/brachytherapy, mod MR, severe AS, RHF, CHF (EF 30% per last [facility]), DM1 c/b neuropathy and retinopathy, hx of TIA, severe peripheral artery disease s/p b/l fempop bypass c/b left thrombosed graft, left subclavian vein stenosis s/p stent, COPD not on O2, gout, hilar lymphadenopathy of unknown etiology, recent hospitalization for pneumonia d/c 12/9/19 presents with left toe pain and xray appearing c/w Left 2nd and 4th toe fracture incidentally found to have hyperkalemia despite HD on day of admit and therefore admitted for urgent HD w/ podiatry eval of left foot.    Hyperkalemia resolving.   - in patient with ESRD despite HD  - nephrology follow  - received regular insulin in ED and dosed lantus 6U per orders  - f/u BMP in am to monitor, trending down on 2nd bmp  - hold lisinopril.     Closed nondisplaced fracture of distal phalanx of lesser toe of left foot, initial encounter.   - podiatry follow  - noted 2nd toe nail avulsion as well. per podiatry c/w bacitracin  - 2nd and 4th digit appear to have fracture  - given no leukocytosis and ESR on admit would monitor off antibiotics unless indicated by podiatry.   - vascular follow  - LE Doppler pending     Chronic obstructive pulmonary disease, unspecified COPD type.   - hx of COPD not on home Oxygen    ESRD (end stage renal disease).  - HD per nephrology.     Peripheral artery disease.   - c/w asa and plavix.   - vascular follow    Type 1 diabetes mellitus with hyperglycemia.   - endocrinology follow  - continue w/ home regimen for now lantus 13U and humalog 6U TID premeal w/ ISS.    Chronic systolic congestive heart failure.   - HOLD lisinopril given hyperkalemia  - continue with hydralazine 50 TID per nephrology with holding parameters    Coronary artery disease involving native coronary artery of native heart without angina pectoris.   - c/w asa, clopidogrel, statin  - cardiology follow    Pierce Viera MD pager [phone]

## 2020-01-06 NOTE — PROGRESS NOTE ADULT - ASSESSMENT
64F w/ extensive PMHx w/ c/c of left foot 2nd digit pain w/ progressive swelling  - Pt seen and evaluated  - Vitals stable, no leukocytosis, ESR 23  -  left foot 2nd digit s/p traumatic nail avulsion  - L foot x-rays reviewed and discussed w/ pt demonstrating acute fracture of left 4th proximal phalanx shaft & cortical irregularity of 2nd middle phalanx highly suspicious of fracture  - BL LE duplex to evaluate bypass graft per vasc  - No acute surgical intervention  -WBAT to left foot with surgical shoe  -No need for dressing/local wound care  - Podiatry will continue to follow while in house  - Podiatrically stable for DC     Follow up with Dr. Xiang VARGAS within 1 week of discharge.  Call for appointment   [place] office: [phone]  [place] office: [phone]

## 2020-01-06 NOTE — PROGRESS NOTE ADULT - SUBJECTIVE AND OBJECTIVE BOX
Cardiovascular Disease Progress Note    Overnight events: No acute events overnight.  still with left toe pain. no cp/sob/palps  Otherwise review of systems negative    Objective Findings:  T(C): 37 (01-06-20 @ 04:16), Max: 37.1 (01-05-20 @ 20:56)  HR: 78 (01-06-20 @ 04:16) (68 - 78)  BP: 118/64 (01-06-20 @ 04:16) (97/52 - 136/77)  RR: 18 (01-06-20 @ 04:16) (18 - 19)  SpO2: 99% (01-06-20 @ 04:16) (99% - 100%)  Wt(kg): --  Daily     Daily       Physical Exam:  Gen: NAD  HEENT: EOMI  CV: RRR, normal S1 + S2, no m/r/g  Lungs: CTAB  Abd: soft, non-tender  Ext: No edema    Telemetry: nsr pvc's    Laboratory Data:                        11.3   7.38  )-----------( 181      ( 06 Jan 2020 07:44 )             35.5     01-06    129<L>  |  86<L>  |  35<H>  ----------------------------<  310<H>  4.6   |  27  |  7.08<H>    Ca    9.4      06 Jan 2020 05:49                Inpatient Medications:  MEDICATIONS  (STANDING):  ALBUTerol    90 MICROgram(s) HFA Inhaler 2 Puff(s) Inhalation every 6 hours  aspirin enteric coated 81 milliGRAM(s) Oral daily  atorvastatin 20 milliGRAM(s) Oral at bedtime  BACItracin   Ointment 1 Application(s) Topical daily  buDESOnide    Inhalation Suspension 0.5 milliGRAM(s) Inhalation two times a day  clopidogrel Tablet 75 milliGRAM(s) Oral daily  dextrose 5%. 1000 milliLiter(s) (50 mL/Hr) IV Continuous <Continuous>  dextrose 50% Injectable 12.5 Gram(s) IV Push once  dextrose 50% Injectable 25 Gram(s) IV Push once  dextrose 50% Injectable 25 Gram(s) IV Push once  heparin  Injectable 5000 Unit(s) SubCutaneous every 8 hours  hydrALAZINE 50 milliGRAM(s) Oral every 8 hours  insulin glargine Injectable (LANTUS) 13 Unit(s) SubCutaneous at bedtime  insulin lispro (HumaLOG) corrective regimen sliding scale   SubCutaneous three times a day before meals  insulin lispro (HumaLOG) corrective regimen sliding scale   SubCutaneous at bedtime  insulin lispro Injectable (HumaLOG) 4 Unit(s) SubCutaneous three times a day before meals  metoprolol succinate ER 50 milliGRAM(s) Oral daily  pantoprazole    Tablet 40 milliGRAM(s) Oral before breakfast  sevelamer carbonate 2400 milliGRAM(s) Oral three times a day      Assessment:  -acute left toe pain  -electrolyte abnormalities  -right lower lobe opacity  -NSVT  -ischemic cardiomyopathy c/b mod to severe LV dysfunction, cad s/p multiple stents  -esrd on hd  -PAD s/p prior intervention       Recs:  -podiatry recs appreciated. humberto results. concerning for ischemic etiology. f/u vasc  -optimization of volume status and electrolyte abnormalities with hd. renal appreciated  -known extensive CAD and ICM. s/p [facility] 2/20/2019 --> severe and diffuse instent restenosis along RCA --> unable to stent due to multiple layers of stenting and prior brachytherapy. denies any true ischemic sx at present  -c/w beta blockers for nsvt/pat/cad (as above). c/w toprol 50mg. also previously on hydral and lisinopril. would resume lisinopril once hyperkalemia resolves and uptitrate GDMT as tolerates. patient previously declined ICD for primary prevention, will cont to address  -hx of prolonged qtc. avoid qtc prolonging meds  -s/p TTE 2019: mod-severe MR, pseudo AS (low flow-low gradient), mod-severe LV dysfunction --> recent CTS consult with dr hebert appreciated. plan is for medical management given surgical risk and patient preference  -c/w asa, plavix, statin for ischemic cardiomyopathy/CAD/PAD          Over 25 minutes spent on total encounter; more than 50% of the visit was spent counseling and/or coordinating care by the attending physician.      Julián Biswas MD   Cardiovascular Disease  [phone]

## 2020-01-06 NOTE — PROGRESS NOTE ADULT - ASSESSMENT
65 y/o F  with PMHx of CHF, CAD, DMT1 on insulin, ACS, TIA, CVA, gout, PVD, ESRD on dialysis with multiple admissions  presents today with loss of left 2nd toenail with severe pain four days ago.  Pain gradually worsening. Reports that she woke up with her nail hanging off, has pain on her left 2nd toe. in er noticed with hyperglycemia as well as hyperkalemia with k 6.7 on admission     1- esrd  2- hyperkalemia  3- HTN   4- shpt   5- cad  6- DM     hd f 180 3.5 hr 2 liter 2 k bath bfr 400 dfr 600   vascular work up for possible avf malfunction given persistent hyperkalemia

## 2020-01-06 NOTE — PROGRESS NOTE ADULT - SUBJECTIVE AND OBJECTIVE BOX
[facility]   [phone]  DIALYSIS NOTE  Chief Complaint: ESRD/Ongoing hemodialysis requirement.     24 hour events/subjective:    still with foot pain no other c/o        ALLERGIES & MEDICATIONS  --------------------------------------------------------------------------------  Allergies    No Known Allergies    Intolerances      Standing Inpatient Medications  ALBUTerol    90 MICROgram(s) HFA Inhaler 2 Puff(s) Inhalation every 6 hours  aspirin enteric coated 81 milliGRAM(s) Oral daily  atorvastatin 20 milliGRAM(s) Oral at bedtime  BACItracin   Ointment 1 Application(s) Topical daily  buDESOnide    Inhalation Suspension 0.5 milliGRAM(s) Inhalation two times a day  clopidogrel Tablet 75 milliGRAM(s) Oral daily  dextrose 5%. 1000 milliLiter(s) IV Continuous <Continuous>  dextrose 50% Injectable 12.5 Gram(s) IV Push once  dextrose 50% Injectable 25 Gram(s) IV Push once  dextrose 50% Injectable 25 Gram(s) IV Push once  heparin  Injectable 5000 Unit(s) SubCutaneous every 8 hours  hydrALAZINE 50 milliGRAM(s) Oral every 8 hours  insulin glargine Injectable (LANTUS) 13 Unit(s) SubCutaneous at bedtime  insulin lispro (HumaLOG) corrective regimen sliding scale   SubCutaneous three times a day before meals  insulin lispro (HumaLOG) corrective regimen sliding scale   SubCutaneous at bedtime  insulin lispro (HumaLOG) corrective regimen sliding scale   SubCutaneous <User Schedule>  insulin lispro Injectable (HumaLOG) 4 Unit(s) SubCutaneous three times a day before meals  insulin regular  human recombinant 2 Unit(s) SubCutaneous at bedtime  metoprolol succinate ER 50 milliGRAM(s) Oral daily  pantoprazole    Tablet 40 milliGRAM(s) Oral before breakfast  sevelamer carbonate 2400 milliGRAM(s) Oral three times a day    PRN Inpatient Medications  acetaminophen   Tablet .. 650 milliGRAM(s) Oral every 6 hours PRN  dextrose 40% Gel 15 Gram(s) Oral once PRN  glucagon  Injectable 1 milliGRAM(s) IntraMuscular once PRN      REVIEW OF SYSTEMS  --------------------------------------------------------------------------------  no itching or rash  no fever or chill  no cp or palp   no sob or cough   no N/V/D/ no abd pain   ext no edema  +  pain         VITALS/PHYSICAL EXAM  --------------------------------------------------------------------------------  T(C): 36.5 (01-06-20 @ 18:32), Max: 37.1 (01-06-20 @ 11:59)  HR: 77 (01-06-20 @ 18:32) (77 - 86)  BP: 126/61 (01-06-20 @ 18:32) (112/62 - 155/77)  RR: 17 (01-06-20 @ 18:32) (17 - 18)  SpO2: 100% (01-06-20 @ 18:32) (95% - 100%)  Wt(kg): --        01-05-20 @ 07:01  -  01-06-20 @ 07:00  --------------------------------------------------------  IN: 640 mL / OUT: 0 mL / NET: 640 mL      Physical Exam:  		  Gen: Non toxic but in pain from left 2nd toe   	no jvd ,  	Pulm: decrease bs  no rales or ronchi or wheezing  	CV: RRR, S1S2; no rub  	Abd: +BS, soft, nontender/nondistended  	: No suprapubic tenderness  	UE: Warm, no cyanosis  no clubbing,  no edema  	LE: Warm, no cyanosis  no clubbing, LLE 1-2-  edema  	Neuro: alert and oriented. speech coherent   	Vascular access: LUE + AVF + thrill and bruit   	  LABS/STUDIES  --------------------------------------------------------------------------------              11.3   7.38  >-----------<  181      [01-06-20 @ 07:44]              35.5     129  |  86  |  35  ----------------------------<  310      [01-06-20 @ 05:49]  4.6   |  27  |  7.08        Ca     9.4     [01-06-20 @ 05:49]                    imp/suggest: ESRD      Hemodialysis Prescription:  	Access:  	Dialyzer: revaclear   	Blood Flow (mL/Min): 400  	Dialysate Flow (mL/Min): 600  	Target UF (Liters):  	Treatment Time:  	Potassium:   	Calcium: 2.5  	  YOLANDA    Vitamin D     continue with hd   see hd flow sheet

## 2020-01-07 ENCOUNTER — TRANSCRIPTION ENCOUNTER (OUTPATIENT)
Age: 65
End: 2020-01-07

## 2020-01-07 LAB
ANION GAP SERPL CALC-SCNC: 14 MMOL/L — SIGNIFICANT CHANGE UP (ref 5–17)
BUN SERPL-MCNC: 17 MG/DL — SIGNIFICANT CHANGE UP (ref 7–23)
CALCIUM SERPL-MCNC: 10.1 MG/DL — SIGNIFICANT CHANGE UP (ref 8.4–10.5)
CHLORIDE SERPL-SCNC: 91 MMOL/L — LOW (ref 96–108)
CO2 SERPL-SCNC: 27 MMOL/L — SIGNIFICANT CHANGE UP (ref 22–31)
CREAT SERPL-MCNC: 5.04 MG/DL — HIGH (ref 0.5–1.3)
GLUCOSE BLDC GLUCOMTR-MCNC: 148 MG/DL — HIGH (ref 70–99)
GLUCOSE BLDC GLUCOMTR-MCNC: 183 MG/DL — HIGH (ref 70–99)
GLUCOSE BLDC GLUCOMTR-MCNC: 204 MG/DL — HIGH (ref 70–99)
GLUCOSE BLDC GLUCOMTR-MCNC: 232 MG/DL — HIGH (ref 70–99)
GLUCOSE BLDC GLUCOMTR-MCNC: 253 MG/DL — HIGH (ref 70–99)
GLUCOSE SERPL-MCNC: 169 MG/DL — HIGH (ref 70–99)
HCT VFR BLD CALC: 38.8 % — SIGNIFICANT CHANGE UP (ref 34.5–45)
HGB BLD-MCNC: 11.8 G/DL — SIGNIFICANT CHANGE UP (ref 11.5–15.5)
MCHC RBC-ENTMCNC: 30.4 GM/DL — LOW (ref 32–36)
MCHC RBC-ENTMCNC: 32 PG — SIGNIFICANT CHANGE UP (ref 27–34)
MCV RBC AUTO: 105.1 FL — HIGH (ref 80–100)
PLATELET # BLD AUTO: 178 K/UL — SIGNIFICANT CHANGE UP (ref 150–400)
POTASSIUM SERPL-MCNC: 5 MMOL/L — SIGNIFICANT CHANGE UP (ref 3.5–5.3)
POTASSIUM SERPL-SCNC: 5 MMOL/L — SIGNIFICANT CHANGE UP (ref 3.5–5.3)
RBC # BLD: 3.69 M/UL — LOW (ref 3.8–5.2)
RBC # FLD: 14.1 % — SIGNIFICANT CHANGE UP (ref 10.3–14.5)
SODIUM SERPL-SCNC: 132 MMOL/L — LOW (ref 135–145)
WBC # BLD: 6.72 K/UL — SIGNIFICANT CHANGE UP (ref 3.8–10.5)
WBC # FLD AUTO: 6.72 K/UL — SIGNIFICANT CHANGE UP (ref 3.8–10.5)

## 2020-01-07 PROCEDURE — 99232 SBSQ HOSP IP/OBS MODERATE 35: CPT

## 2020-01-07 RX ORDER — OXYCODONE HYDROCHLORIDE 5 MG/1
2.5 TABLET ORAL ONCE
Refills: 0 | Status: DISCONTINUED | OUTPATIENT
Start: 2020-01-07 | End: 2020-01-07

## 2020-01-07 RX ORDER — INSULIN GLARGINE 100 [IU]/ML
12 INJECTION, SOLUTION SUBCUTANEOUS
Qty: 0 | Refills: 0 | DISCHARGE

## 2020-01-07 RX ORDER — INSULIN LISPRO 100/ML
4 VIAL (ML) SUBCUTANEOUS
Qty: 0 | Refills: 0 | DISCHARGE

## 2020-01-07 RX ORDER — INSULIN LISPRO 100/ML
2 VIAL (ML) SUBCUTANEOUS AT BEDTIME
Refills: 0 | Status: DISCONTINUED | OUTPATIENT
Start: 2020-01-07 | End: 2020-01-09

## 2020-01-07 RX ADMIN — ALBUTEROL 2 PUFF(S): 90 AEROSOL, METERED ORAL at 18:38

## 2020-01-07 RX ADMIN — SEVELAMER CARBONATE 2400 MILLIGRAM(S): 2400 POWDER, FOR SUSPENSION ORAL at 08:33

## 2020-01-07 RX ADMIN — ALBUTEROL 2 PUFF(S): 90 AEROSOL, METERED ORAL at 12:10

## 2020-01-07 RX ADMIN — Medication 4 UNIT(S): at 13:01

## 2020-01-07 RX ADMIN — OXYCODONE HYDROCHLORIDE 2.5 MILLIGRAM(S): 5 TABLET ORAL at 22:24

## 2020-01-07 RX ADMIN — Medication 1 APPLICATION(S): at 12:10

## 2020-01-07 RX ADMIN — Medication 50 MILLIGRAM(S): at 15:04

## 2020-01-07 RX ADMIN — Medication 0: at 08:31

## 2020-01-07 RX ADMIN — PANTOPRAZOLE SODIUM 40 MILLIGRAM(S): 20 TABLET, DELAYED RELEASE ORAL at 06:23

## 2020-01-07 RX ADMIN — SEVELAMER CARBONATE 2400 MILLIGRAM(S): 2400 POWDER, FOR SUSPENSION ORAL at 12:10

## 2020-01-07 RX ADMIN — Medication 4 UNIT(S): at 17:49

## 2020-01-07 RX ADMIN — OXYCODONE HYDROCHLORIDE 2.5 MILLIGRAM(S): 5 TABLET ORAL at 07:10

## 2020-01-07 RX ADMIN — Medication 650 MILLIGRAM(S): at 22:24

## 2020-01-07 RX ADMIN — CLOPIDOGREL BISULFATE 75 MILLIGRAM(S): 75 TABLET, FILM COATED ORAL at 12:10

## 2020-01-07 RX ADMIN — SEVELAMER CARBONATE 2400 MILLIGRAM(S): 2400 POWDER, FOR SUSPENSION ORAL at 17:50

## 2020-01-07 RX ADMIN — Medication 650 MILLIGRAM(S): at 22:54

## 2020-01-07 RX ADMIN — INSULIN GLARGINE 13 UNIT(S): 100 INJECTION, SOLUTION SUBCUTANEOUS at 22:20

## 2020-01-07 RX ADMIN — Medication 0.5 MILLIGRAM(S): at 06:24

## 2020-01-07 RX ADMIN — Medication 3: at 17:49

## 2020-01-07 RX ADMIN — Medication 0.5 MILLIGRAM(S): at 18:40

## 2020-01-07 RX ADMIN — Medication 81 MILLIGRAM(S): at 12:10

## 2020-01-07 RX ADMIN — Medication 4 UNIT(S): at 08:29

## 2020-01-07 RX ADMIN — OXYCODONE HYDROCHLORIDE 2.5 MILLIGRAM(S): 5 TABLET ORAL at 06:40

## 2020-01-07 RX ADMIN — OXYCODONE HYDROCHLORIDE 2.5 MILLIGRAM(S): 5 TABLET ORAL at 22:54

## 2020-01-07 RX ADMIN — Medication 50 MILLIGRAM(S): at 22:24

## 2020-01-07 RX ADMIN — Medication 2: at 13:00

## 2020-01-07 RX ADMIN — ALBUTEROL 2 PUFF(S): 90 AEROSOL, METERED ORAL at 06:24

## 2020-01-07 RX ADMIN — ATORVASTATIN CALCIUM 20 MILLIGRAM(S): 80 TABLET, FILM COATED ORAL at 22:24

## 2020-01-07 NOTE — PROGRESS NOTE ADULT - SUBJECTIVE AND OBJECTIVE BOX
[facility]   [phone]  RENAL FOLLOW UP NOTE  --------------------------------------------------------------------------------  Chief Complaint:    24 hour events/subjective:    seen this evening.   no c/o sob     PAST HISTORY  --------------------------------------------------------------------------------  No significant changes to PMH, PSH, FHx, SHx, unless otherwise noted    ALLERGIES & MEDICATIONS  --------------------------------------------------------------------------------  Allergies    No Known Allergies    Intolerances      Standing Inpatient Medications  ALBUTerol    90 MICROgram(s) HFA Inhaler 2 Puff(s) Inhalation every 6 hours  aspirin enteric coated 81 milliGRAM(s) Oral daily  atorvastatin 20 milliGRAM(s) Oral at bedtime  BACItracin   Ointment 1 Application(s) Topical daily  buDESOnide    Inhalation Suspension 0.5 milliGRAM(s) Inhalation two times a day  clopidogrel Tablet 75 milliGRAM(s) Oral daily  dextrose 5%. 1000 milliLiter(s) IV Continuous <Continuous>  dextrose 50% Injectable 12.5 Gram(s) IV Push once  dextrose 50% Injectable 25 Gram(s) IV Push once  dextrose 50% Injectable 25 Gram(s) IV Push once  heparin  Injectable 5000 Unit(s) SubCutaneous every 8 hours  hydrALAZINE 50 milliGRAM(s) Oral every 8 hours  insulin glargine Injectable (LANTUS) 13 Unit(s) SubCutaneous at bedtime  insulin lispro (HumaLOG) corrective regimen sliding scale   SubCutaneous three times a day before meals  insulin lispro (HumaLOG) corrective regimen sliding scale   SubCutaneous at bedtime  insulin lispro (HumaLOG) corrective regimen sliding scale   SubCutaneous <User Schedule>  insulin lispro Injectable (HumaLOG) 2 Unit(s) SubCutaneous at bedtime  insulin lispro Injectable (HumaLOG) 4 Unit(s) SubCutaneous three times a day before meals  metoprolol succinate ER 50 milliGRAM(s) Oral daily  pantoprazole    Tablet 40 milliGRAM(s) Oral before breakfast  sevelamer carbonate 2400 milliGRAM(s) Oral three times a day    PRN Inpatient Medications  acetaminophen   Tablet .. 650 milliGRAM(s) Oral every 6 hours PRN  dextrose 40% Gel 15 Gram(s) Oral once PRN  glucagon  Injectable 1 milliGRAM(s) IntraMuscular once PRN      REVIEW OF SYSTEMS  --------------------------------------------------------------------------------    Gen: denies  fevers/chills,  CVS: denies chest pain/palpitations  Resp: denies SOB/Cough  GI: Denies N/V/Abd pain  : Denies dysuria    All other systems were reviewed and are negative, except as noted.    VITALS/PHYSICAL EXAM  --------------------------------------------------------------------------------  T(C): 36.7 (01-07-20 @ 20:52), Max: 37 (01-07-20 @ 04:03)  HR: 72 (01-07-20 @ 20:52) (72 - 87)  BP: 104/58 (01-07-20 @ 20:52) (91/55 - 115/54)  RR: 18 (01-07-20 @ 20:52) (16 - 20)  SpO2: 100% (01-07-20 @ 20:52) (97% - 100%)  Wt(kg): --        01-06-20 @ 07:01  -  01-07-20 @ 07:00  --------------------------------------------------------  IN: 0 mL / OUT: 2000 mL / NET: -2000 mL      Physical Exam:  	  Gen:  comfortable appearing    	no jvd ,  	Pulm: decrease bs  no rales or ronchi or wheezing  	CV: RRR, S1S2; no rub  	Abd: +BS, soft, nontender/nondistended  	: No suprapubic tenderness  	UE: Warm, no cyanosis  no clubbing,  no edema  	LE: Warm, no cyanosis  no clubbing, LLE 1 +   edema  	Neuro: alert and oriented. speech coherent   	Vascular access: LUE + AVF + thrill and bruit         LABS/STUDIES  --------------------------------------------------------------------------------              11.8   6.72  >-----------<  178      [01-07-20 @ 09:16]              38.8     132  |  91  |  17  ----------------------------<  169      [01-07-20 @ 05:59]  5.0   |  27  |  5.04        Ca     10.1     [01-07-20 @ 05:59]            Creatinine Trend:  SCr 5.04 [01-07 @ 05:59]  SCr 7.08 [01-06 @ 05:49]  SCr 4.73 [01-05 @ 06:22]  SCr 6.03 [01-04 @ 06:20]  SCr 3.75 [01-03 @ 06:34]                  Iron 67, TIBC 234, %sat 29      [12-04-19 @ 08:38]  Ferritin 1021      [12-04-19 @ 08:40]  PTH -- (Ca 8.3)      [12-04-19 @ 08:38]   952  PTH -- (Ca 9.6)      [06-17-19 @ 18:06]   177  PTH -- (Ca 7.8)      [03-29-19 @ 20:38]   73  PTH -- (Ca 7.3)      [02-22-19 @ 02:49]   59  HbA1c 8.7      [11-13-19 @ 23:44]  TSH 1.78      [11-14-19 @ 09:15]  Lipid: chol 108, TG 76, HDL 57, LDL 36      [11-14-19 @ 09:16]

## 2020-01-07 NOTE — PROGRESS NOTE ADULT - ASSESSMENT
64F w/ extensive PMHx w/ c/c of left foot 2nd digit pain w/ progressive swelling    - Vitals stable, no leukocytosis, ESR 23  -  left foot 2nd digit s/p traumatic nail avulsion  - L foot x-rays reviewed demonstrating acute fracture of left 4th proximal phalanx shaft & cortical irregularity of 2nd middle phalanx highly suspicious of fracture  - No acute surgical intervention  - WBAT to left foot with surgical shoe  -No need for dressing/local wound care  - Podiatry will continue to follow while in house  - Podiatrically stable for DC     Follow up with Dr. Xiang VARGAS within 1 week of discharge.  Call for appointment   [place] office: [phone]  [place] office: [phone]

## 2020-01-07 NOTE — PROGRESS NOTE ADULT - ASSESSMENT
Assessment:  · Assessment		  64F w/ ESRD on HD(M/T/Th/Sa), CAD s/p multiple stents/brachytherapy, mod MR, severe AS, RHF, CHF (EF 30% per last [facility]), DM1 c/b neuropathy and retinopathy, hx of TIA, severe peripheral artery disease s/p b/l fempop bypass c/b left thrombosed graft, left subclavian vein stenosis s/p stent, COPD not on O2, gout, hilar lymphadenopathy of unknown etiology, recent hospitalization for pneumonia d/c 12/9/19 presents with left toe pain and xray appearing c/w Left 2nd and 4th toe fracture incidentally found to have hyperkalemia despite HD on day of admit and therefore admitted for urgent HD w/ podiatry eval of left foot.    Hyperkalemia resolving.   - in patient with ESRD despite HD  - nephrology follow  - received regular insulin in ED and dosed lantus 6U per orders  - f/u BMP in am to monitor, trending down on 2nd bmp  - hold lisinopril.     Closed nondisplaced fracture of distal phalanx of lesser toe of left foot, initial encounter.   - podiatry follow  - noted 2nd toe nail avulsion as well. per podiatry c/w bacitracin  - 2nd and 4th digit appear to have fracture  - given no leukocytosis and ESR on admit would monitor off antibiotics unless indicated by podiatry.   - vascular follow  - LE Doppler pending     Chronic obstructive pulmonary disease, unspecified COPD type.   - hx of COPD not on home Oxygen    ESRD (end stage renal disease).  - HD per nephrology.     Peripheral artery disease.   - c/w asa and plavix.   - vascular follow    AVF revision by Vascular    Type 1 diabetes mellitus with hyperglycemia.   - endocrinology follow  - continue w/ home regimen for now lantus 13U and humalog 6U TID premeal w/ ISS.    Chronic systolic congestive heart failure.   - HOLD lisinopril given hyperkalemia  - continue with hydralazine 50 TID per nephrology with holding parameters    Coronary artery disease involving native coronary artery of native heart without angina pectoris.   - c/w asa, clopidogrel, statin  - cardiology follow    DCP home if no AVF revision planned.    Pierce Viera MD pager [phone]

## 2020-01-07 NOTE — DISCHARGE NOTE PROVIDER - CARE PROVIDERS DIRECT ADDRESSES
,DirectAddress_Unknown,DirectAddress_Unknown,[email or web address],DirectAddress_Unknown,DirectAddress_Unknown,DirectAddress_Unknown ,DirectAddress_Unknown,DirectAddress_Unknown,DirectAddress_Unknown,DirectAddress_Unknown,DirectAddress_Unknown,[email or web address]

## 2020-01-07 NOTE — DISCHARGE NOTE PROVIDER - INSTRUCTIONS
Renal restrictions: No concentrated potassium  No concentrated phosphorus, low sodium  No protein restriction    Diabetic diet- consistent carbohydrate with evening snack

## 2020-01-07 NOTE — PROGRESS NOTE ADULT - SUBJECTIVE AND OBJECTIVE BOX
Patient is a 64y old  Female who presents with a chief complaint of CC:64F p/w acute left toe pain (07 Jan 2020 20:58)      SUBJECTIVE / OVERNIGHT EVENTS: Comfortable without new complaints.   Review of Systems  chest pain no  palpitations no  sob no  nausea no  headache no    MEDICATIONS  (STANDING):  ALBUTerol    90 MICROgram(s) HFA Inhaler 2 Puff(s) Inhalation every 6 hours  aspirin enteric coated 81 milliGRAM(s) Oral daily  atorvastatin 20 milliGRAM(s) Oral at bedtime  BACItracin   Ointment 1 Application(s) Topical daily  buDESOnide    Inhalation Suspension 0.5 milliGRAM(s) Inhalation two times a day  clopidogrel Tablet 75 milliGRAM(s) Oral daily  dextrose 5%. 1000 milliLiter(s) (50 mL/Hr) IV Continuous <Continuous>  dextrose 50% Injectable 12.5 Gram(s) IV Push once  dextrose 50% Injectable 25 Gram(s) IV Push once  dextrose 50% Injectable 25 Gram(s) IV Push once  heparin  Injectable 5000 Unit(s) SubCutaneous every 8 hours  hydrALAZINE 50 milliGRAM(s) Oral every 8 hours  insulin glargine Injectable (LANTUS) 13 Unit(s) SubCutaneous at bedtime  insulin lispro (HumaLOG) corrective regimen sliding scale   SubCutaneous three times a day before meals  insulin lispro (HumaLOG) corrective regimen sliding scale   SubCutaneous at bedtime  insulin lispro (HumaLOG) corrective regimen sliding scale   SubCutaneous <User Schedule>  insulin lispro Injectable (HumaLOG) 2 Unit(s) SubCutaneous at bedtime  insulin lispro Injectable (HumaLOG) 4 Unit(s) SubCutaneous three times a day before meals  metoprolol succinate ER 50 milliGRAM(s) Oral daily  pantoprazole    Tablet 40 milliGRAM(s) Oral before breakfast  sevelamer carbonate 2400 milliGRAM(s) Oral three times a day    MEDICATIONS  (PRN):  acetaminophen   Tablet .. 650 milliGRAM(s) Oral every 6 hours PRN Mild Pain (1 - 3), Moderate Pain (4 - 6)  dextrose 40% Gel 15 Gram(s) Oral once PRN Blood Glucose LESS THAN 70 milliGRAM(s)/deciliter  glucagon  Injectable 1 milliGRAM(s) IntraMuscular once PRN Glucose LESS THAN 70 milligrams/deciliter      Vital Signs Last 24 Hrs  T(C): 36.7 (07 Jan 2020 20:52), Max: 37 (07 Jan 2020 04:03)  T(F): 98 (07 Jan 2020 20:52), Max: 98.6 (07 Jan 2020 04:03)  HR: 72 (07 Jan 2020 20:52) (72 - 87)  BP: 104/58 (07 Jan 2020 20:52) (91/55 - 115/54)  BP(mean): --  RR: 18 (07 Jan 2020 20:52) (16 - 20)  SpO2: 100% (07 Jan 2020 20:52) (97% - 100%)    PHYSICAL EXAM:  GENERAL: NAD  HEAD:  Atraumatic, Normocephalic  EYES: EOMI, PERRLA, conjunctiva and sclera clear  NECK: Supple, No JVD  CHEST/LUNG: Clear to auscultation bilaterally; No wheeze  HEART: Regular rate and rhythm; No murmurs, rubs, or gallops  ABDOMEN: Soft, Nontender, Nondistended; Bowel sounds present  EXTREMITIES:  2+ Peripheral Pulses, No clubbing, cyanosis, or edema L foot with open hard shoe.  PSYCH: AAOx3  NEUROLOGY: non-focal  SKIN: No rashes or lesions    LABS:                        11.8   6.72  )-----------( 178      ( 07 Jan 2020 09:16 )             38.8     01-07    132<L>  |  91<L>  |  17  ----------------------------<  169<H>  5.0   |  27  |  5.04<H>    Ca    10.1      07 Jan 2020 05:59                  RADIOLOGY & ADDITIONAL TESTS:    Imaging Personally Reviewed:    Consultant(s) Notes Reviewed:      Care Discussed with Consultants/Other Providers:

## 2020-01-07 NOTE — PHYSICAL THERAPY INITIAL EVALUATION ADULT - PERTINENT HX OF CURRENT PROBLEM, REHAB EVAL
64F w/ ESRD on HD(M/T/Th/Sa), CAD s/p stents/brachytherapy, mod MR, severe AS, RHF, CHF (EF 30% per last [facility]), DM1 c/b neuropathy and retinopathy, TIA, severe PAD s/p b/l fempop bypass c/b L thrombosed graft, L subclavian vein stenosis s/p stent, COPD, hilar lymphadenopathy of unknown etiology, recent admit for PNA dc 12/9/19, p/w L toe pain and xray appearing c/w L 2nd and 4th toe fx incidentally found to have hyperkalemia despite HD on day of admit s/p urgent HD w/ podiatry eval of L foot.

## 2020-01-07 NOTE — PROGRESS NOTE ADULT - ASSESSMENT
65y/o F well known to diabetes team from frequent admissions with SOB/CP. Pt w/h/o uncontrolled TIDM c/b neuropathy and retinopathy as well as CAD s/p multiple stents, CHF (EF 30%), TIA, PVD s/p b/l fem-pop bypass, ESRD> HD. Recent admission with PNA. Here L foot 2nd digit pain w/ progressive swelling and positive fx of 4th L toe. S/p LE  duplex (1/6/20) with severe b/l arterial vascular stenosis and occlusive disease. Tolerating POs with glycemic control at goal within the past 24 hours while on present insulin doses. No hypoglycemia. Will continue present insulin regimen. BG goal 100s to 180s if possible since pt has h/o nutritional indiscretions on prior admissions. Pt states not eating extra food at this time.

## 2020-01-07 NOTE — PROGRESS NOTE ADULT - PROBLEM SELECTOR PLAN 1
Plan: -test BG AC/HS/2AM  -C/W Lantus 13 units QHS.   -c/w Humalog to 4 units AC meals for now (hold if not eating)  -c/w Humalog 2 units w/HS snack if eating. Hold if not eating  -c/w Humalog low correction scale (correct >200mg/dl) AC and Low HS scale. c/w custom 2AM scale  inform endocrine of hypoglycemia or persistent hyperglycemia episodes as changes in pts insulin regimen will need to be made.   -Notify endocrine if any plans to be NPO/diet changes as this will also affect insulin regimen  DC plan: basal bolus. final doses TBD  -Pt to f/u with Dr Ley,  pvt endo as out pt. Will need podiatrist/vascular follow up  -Plan discussed with pt/team.  Contact info: [phone] (24/7). pager [phone].

## 2020-01-07 NOTE — PHYSICAL THERAPY INITIAL EVALUATION ADULT - GENERAL OBSERVATIONS, REHAB EVAL
Pt s/p L 2nd & 4th digit fx, WBAT in Darco shoe as per NP Sandra. Pt rec'd semi supine in bed in NAD, VSS, +Darco shoe donned. Pt willing to work with PT.

## 2020-01-07 NOTE — DISCHARGE NOTE PROVIDER - HOSPITAL COURSE
64F w/ ESRD on HD(M/T/Th/Sa), CAD s/p multiple stents/brachytherapy, mod MR, severe AS, RHF, CHF (EF 30% per last [facility]), DM1 c/b neuropathy and retinopathy, hx of TIA, severe peripheral artery disease s/p b/l fempop bypass c/b left thrombosed graft, left subclavian vein stenosis s/p stent, COPD not on O2, gout, hilar lymphadenopathy of unknown etiology, recent hospitalization for pneumonia d/c 12/9/19 presents with left toe pain and xray appearing c/w Left 2nd and 4th toe fracture incidentally found to have hyperkalemia despite HD on day of admit and therefore admitted for urgent HD w/ podiatry eval of left foot.    -> For Hyperkalemia resolving. Nephrology following, on HD    Lisinopril on hold    -> For Closed nondisplaced fracture of distal phalanx of lesser toe of left foot, podiatry was consulted.    - noted 2nd toe nail avulsion as well. per podiatry c/w bacitracin        -> For Chronic obstructive pulmonary disease, unspecified COPD type.     - hx of COPD not on home Oxygen    ->ESRD, on HD     -> For Peripheral artery disease, vasculary surgery was consulted. Continue asa and plavix.     -> For Type 1 diabetes mellitus with hyperglycemia, endocrinology was consulted    -> For Chronic systolic congestive heart failure,  lisinopril on hold given hyperkalemia    - continue with hydralazine 50 TID per nephrology with holding parameters    -> For Coronary artery disease, cardiology consulted. On asa, statin, and plavix.

## 2020-01-07 NOTE — PHYSICAL THERAPY INITIAL EVALUATION ADULT - ASR WT BEARING STATUS EVAL
CONTINUED: XRAY L FOOT: Question age-indeterminate nondisplaced fracture of the second middle phalanx, favored to be chronic. Correlate for point tenderness. Fracture deformity of the midshaft of the fourth proximal phalanx, new from the prior x-ray, but also likely chronic. No other fractures. Joint spaces are maintained. Diffuse bone demineralization. Surgical clips in the lower leg. Vascular calcifications.; CXR: Clear lungs. Cardiomegaly.; VA PHYSIOL BLE: Limited study. Segmental pressure measurements were not obtained. Pulse volume recordings suggest moderate to severe arterial occlusive disease at the left femoral-popliteal and popliteal tibial levels. There is also markedly decreased flow at both foot levels. VA DUPLEX BLE: There is severe bilateral arterial vascular stenotic and occlusive disease. On the right, there are flow-limiting stenoses of the distal external iliac artery and common femoral artery. There is a patent synthetic femoral popliteal arterial graft bypassing an occluded right superficial femoral artery.There is a high grade, greater than 50% stenosis of the native right popliteal artery. The right posterior tibial artery is occluded in the distal calf. There is a severe, greater than 50% stenosis of the anterior tibial artery in the proximal calf. On the left, there are flow-limiting stenoses of the distal external iliac and common femoral arteries. There is a severe flow-limiting stenosis of the left deep femoral artery. There is an occluded synthetic bypass graft and a patent femoral popliteal artery vein graft bypassing the occluded left superficial femoral artery. There are severe segmental in-graft stenoses in the proximal and proximal to mid left thigh. There are segmental flow-limiting stenoses of the proximal and distal left posterior tibial artery. There is a flow-limiting stenosis of the proximal left peroneal artery./no weight-bearing restrictions Left LE/CONTINUED: XRAY L FOOT: Question age-indeterminate nondisplaced fracture of the second middle phalanx, favored to be chronic. Correlate for point tenderness. Fracture deformity of the midshaft of the fourth proximal phalanx, new from the prior x-ray, but also likely chronic. No other fractures. Joint spaces are maintained. Diffuse bone demineralization. Surgical clips in the lower leg. Vascular calcifications.; CXR: Clear lungs. Cardiomegaly.; VA PHYSIOL BLE: Limited study. Segmental pressure measurements were not obtained. Pulse volume recordings suggest moderate to severe arterial occlusive disease at the left femoral-popliteal and popliteal tibial levels. There is also markedly decreased flow at both foot levels. VA DUPLEX BLE: There is severe bilateral arterial vascular stenotic and occlusive disease. On the right, there are flow-limiting stenoses of the distal external iliac artery and common femoral artery. There is a patent synthetic femoral popliteal arterial graft bypassing an occluded right superficial femoral artery.There is a high grade, greater than 50% stenosis of the native right popliteal artery. The right posterior tibial artery is occluded in the distal calf. There is a severe, greater than 50% stenosis of the anterior tibial artery in the proximal calf. On the left, there are flow-limiting stenoses of the distal external iliac and common femoral arteries. There is a severe flow-limiting stenosis of the left deep femoral artery. There is an occluded synthetic bypass graft and a patent femoral popliteal artery vein graft bypassing the occluded left superficial femoral artery. There are severe segmental in-graft stenoses in the proximal and proximal to mid left thigh. There are segmental flow-limiting stenoses of the proximal and distal left posterior tibial artery. There is a flow-limiting stenosis of the proximal left peroneal artery.

## 2020-01-07 NOTE — PHYSICAL THERAPY INITIAL EVALUATION ADULT - ADDITIONAL COMMENTS
Pt lives with  and children in private home with 3 steps to enter, resides on first floor. Pt states she amb with a cane outdoors and no AD inside. Pt reports she is indep with ADLs.

## 2020-01-07 NOTE — DISCHARGE NOTE PROVIDER - NSDCCPCAREPLAN_GEN_ALL_CORE_FT
PRINCIPAL DISCHARGE DIAGNOSIS  Diagnosis: Hyperkalemia  Assessment and Plan of Treatment: Followed by nephrology  Potassium level 6.7 on admission, resolving.  Continue toprol 50mg and hydralazine  Lisinopril held for hyperkalemia   -Vascular surgery was called to see if AV fistula has malfunction  FOLLOW UP with nephrology as outpatient        SECONDARY DISCHARGE DIAGNOSES  Diagnosis: Chronic systolic congestive heart failure  Assessment and Plan of Treatment: Lisinopril was held given hyperkalemia  - continue with hydralazine    If you gain 3lbs in 3 days, or 5lbs in a week call your Health Care Provider.  Do not eat or drink foods containing more than 2000mg of salt (sodium) in your diet every day.  Call your Health Care Provider if you have any swelling or increased swelling in your feet, ankles, and/or stomach.  The Pt was provided with CHF diet instruction (low sodium diet, daily weights, label reading, Heart Healthy Cooking Tips & Heart Healthy shopping Tips).  Take all of your medication as directed.  If you become dizzy call your Health Care Provider.      Diagnosis: Closed nondisplaced fracture of distal phalanx of lesser toe of left foot  Assessment and Plan of Treatment: Followed by podiatry- no acute surgical intervention  ->acute fracture of left 4th proximal phalanx shaft & cortical irregularity of 2nd middle phalanx highly suspicious of fracture  - Weight bearing as tolerated to left foot with surgical shoe  -No need for dressing/local wound care  Follow up with podiatry, Dr. Xiang VARGAS within 1 week of discharge.  Call for appointment   [place] office: [phone]  [place] office: [phone]      Diagnosis: COPD (chronic obstructive pulmonary disease)  Assessment and Plan of Treatment: Call your Health Care provider upon arrival home to make a follow up appointment within one week.  Take all inhalers as prescribed by your Health Care Provider.  Take steroids as prescribed by your Health Care Provider.  If your cough increases infrequency and severity and/or you have shortness of breath or increased shortness of breath call your Health Care Provider.  If you develop fever, chills, night sweats, malaise, and/or change in mental status call your Health care Provider.  Nutrition is very important.  Eat small frequent meals.  Increase your activity as tolerated.  Do not stay in bed all day  Pt. verbalized an understanding of all instructions.      Diagnosis: ESRD on dialysis  Assessment and Plan of Treatment: Continue with your dialysis treatments. Follow with your nephrologist (kidney physician). Continue your medications as prescribed.      Diagnosis: Peripheral artery disease  Assessment and Plan of Treatment: Continue aspirin and plavix  vacular surgery was consulted:      Diagnosis: Type 1 diabetes mellitus with hyperglycemia  Assessment and Plan of Treatment: Followed by endocrinology  continue to take insulin as directed:  Basaglar 14 units at bed time  Humalog 3-4 units (depending on glucose level) with meals  FOLLOW UP with endocrinology as outpatient, Dr. Ley    Diagnosis: Chronic systolic congestive heart failure  Assessment and Plan of Treatment: Weigh yourself daily.  If you gain 3lbs in 3 days, or 5lbs in a week call your Health Care Provider.  Do not eat or drink foods containing more than 2000mg of salt (sodium) in your diet every day.  Call your Health Care Provider if you have any swelling or increased swelling in your feet, ankles, and/or stomach.  The Pt was provided with CHF diet instruction (low sodium diet, daily weights, label reading, Heart Healthy Cooking Tips & Heart Healthy shopping Tips).  Take all of your medication as directed.  If you become dizzy call your Health Care Provider.      Diagnosis: CAD (coronary artery disease)  Assessment and Plan of Treatment: -continue aspirin, plavix, statin  -FOLLOW UP with cardiology as outpatient  Coronary artery disease is a condition where the arteries the supply the heart muscle get clogges with fatty deposits & puts you at risk for a heart attack  Call your doctor if you have any new pain, pressure, or discomfort in the center of your chest, pain, tingling or discomfort in arms, back, neck, jaw, or stomach, shortness of breath, nausea, vomiting, burping or heartburn, sweating, cold and clammy skin, racing or abnormal heartbeat for more than 10 minutes or if they keep coming & going.  Call 911 and do not tr to get to hospital by care  You can help yourself with lefestyle changes (quitting smoking if you smoke), eat lots of fruits & vegetables & low fat dairy products, not a lot of meat & fatty foods, walk or some form of physical activity most days of the week, lose weight if you are overweight  Take your cardiac medication as prescribed to lower cholesterol, to lower blood pressure, aspirin to prevent blood clots, and diabetes control  Make sure to keep appointments with doctor for cardiac follow up care      Diagnosis: HLD (hyperlipidemia)  Assessment and Plan of Treatment: Low salt, low fat, low cholesterol, diabetic diet if appropriate  Continue medication as prescribed  Exercise, increase your activity level  Pt. verbalized an understanding of all instructions.      Diagnosis: HTN (hypertension)  Assessment and Plan of Treatment: Low salt diet  Activity as tolerated.  Take all medication as prescribed.  Follow up with your medical doctor for routine blood pressure monitoring at your next visit.  Notify your doctor if you have any of the following symptoms:   Dizziness, Lightheadedness, Blurry vision, Headache, Chest pain, Shortness of breath

## 2020-01-07 NOTE — DISCHARGE NOTE PROVIDER - CARE PROVIDER_API CALL
Arsalan Castro)  Internal Medicine  [address] [place] [address]  Phone: [phone]  Fax: [phone]  Follow Up Time: 1 week    Pina Ley)  EndocrinologyMetabDiabetes  [address] [place] [address]  Phone: [phone]  Fax: [phone]  Follow Up Time: 1 week    Jessika Guerra)  Surgery  [address] [facility], [address], [place] [address]  Phone: [phone]  Fax: [phone]  Follow Up Time:     Daisy Burger (DO)  Nephrology  [address] [address]  Phone: [phone]  Fax: [phone]  Follow Up Time: 1-3 days    Dr. Otoole,   podiatry  Phone: (   )    -  Fax: (   )    -  Follow Up Time: 1 week    Julián Biswas)  Internal Medicine  [address] [place] [address]  Phone: [phone]  Fax: [phone]  Follow Up Time: 1 week Arsalan Castro)  Internal Medicine  [address] [place] [address]  Phone: [phone]  Fax: [phone]  Follow Up Time: 1 week    Pina Ley)  EndocrinologyMetabDiabetes  [address], [place] [address]  Phone: [phone]  Fax: [phone]  Follow Up Time: 1 week    Daisy Burger ()  Nephrology  [address] [facility] [address]  [place] [address]  Phone: [phone]  Fax: [phone]  Follow Up Time: 1-3 days    Julián Biswas)  Internal Medicine  [address], [place] [address]  Phone: [phone]  Fax: [phone]  Follow Up Time: 1 week    Dr. Otoole,   podiatry  Phone: (   )    -  Fax: (   )    -  Follow Up Time: 1 week    Lance Elizalde)  Surgery; Vascular Surgery  [address] [facility], [place] [address]  Phone: [phone]  Fax: [phone]  Scheduled Appointment: 01/13/2020 01:00 PM

## 2020-01-07 NOTE — DISCHARGE NOTE PROVIDER - NSDCFUADDINST_GEN_ALL_CORE_FT
Follow up with podiatry, Dr. Xiang VARGAS within 1 week of discharge.  Call for appointment   [place] office: [phone]  [place] office: [phone]

## 2020-01-07 NOTE — DISCHARGE NOTE PROVIDER - NSDCMRMEDTOKEN_GEN_ALL_CORE_FT
acetaminophen 325 mg oral tablet: 2 tab(s) orally every 6 hours, As Needed - 3), Moderate Pain (4 - 6)  aspirin 81 mg oral delayed release tablet: 1 tab(s) orally once a day  atorvastatin 20 mg oral tablet: 1 tab(s) orally once a day  Basaglar KwikPen 100 units/mL subcutaneous solution: 13 unit(s) subcutaneous once a day (at bedtime)  budesonide 0.5 mg/2 mL inhalation suspension: 2 milliliter(s) inhaled 2 times a day  clopidogrel 75 mg oral tablet: 1 tab(s) orally once a day  HumaLOG KwikPen 100 units/mL injectable solution: 3-4 unit(s) subcutaneous 3 times a day - Family to adjust insulin levels based on PO intake and fingersticks as previously discussed with the Endocrinology Team  hydrALAZINE 25 mg oral tablet: 2 tab(s) orally once a day (AM)  4 tab(s) orally once a day (PM)    NOTE: Rx dispensed as 2tabs 3times a day  ipratropium-albuterol 0.5 mg-2.5 mg/3 mLinhalation solution: 3 milliliter(s) inhaled every 6 hours  lisinopril 40 mg oral tablet: 1 tab(s) orally once a day  metoprolol succinate 50 mg oral tablet, extended release: 1 tab(s) orally once a day  pantoprazole 40 mg oral delayed release tablet: 1 tab(s) orally once a day  rolling walker: ICD: S92.535A  sevelamer carbonate 800 mg oral tablet: 1 tab(s) orally 3 times a day (with meals) acetaminophen 325 mg oral tablet: 2 tab(s) orally every 6 hours, As Needed - 3), Moderate Pain (4 - 6)  aspirin 81 mg oral delayed release tablet: 1 tab(s) orally once a day  atorvastatin 20 mg oral tablet: 1 tab(s) orally once a day  Basaglar KwikPen 100 units/mL subcutaneous solution: 13 unit(s) subcutaneous once a day (at bedtime)  budesonide 0.5 mg/2 mL inhalation suspension: 2 milliliter(s) inhaled 2 times a day  clopidogrel 75 mg oral tablet: 1 tab(s) orally once a day  HumaLOG KwikPen 100 units/mL injectable solution: 3-4 unit(s) subcutaneous 3 times a day - Family to adjust insulin levels based on PO intake and fingersticks as previously discussed with the Endocrinology Team  hydrALAZINE 50 mg oral tablet: 1 tab(s) orally every 8 hours  ipratropium-albuterol 0.5 mg-2.5 mg/3 mLinhalation solution: 3 milliliter(s) inhaled every 6 hours  metoprolol succinate 50 mg oral tablet, extended release: 1 tab(s) orally once a day  pantoprazole 40 mg oral delayed release tablet: 1 tab(s) orally once a day  rolling walker: ICD: S92.535A  sevelamer carbonate 800 mg oral tablet: 1 tab(s) orally 3 times a day (with meals)

## 2020-01-07 NOTE — DISCHARGE NOTE PROVIDER - PROVIDER TOKENS
PROVIDER:[TOKEN:[[id number]],FOLLOWUP:[1 week]],PROVIDER:[TOKEN:[[id number]],FOLLOWUP:[1 week]],PROVIDER:[TOKEN:[[id number]]],PROVIDER:[TOKEN:[[id number]],FOLLOWUP:[1-3 days]],FREE:[LAST:[Dr. Otoole],PHONE:[(   )    -],FAX:[(   )    -],ADDRESS:[podiatry],FOLLOWUP:[1 week]],PROVIDER:[TOKEN:[[id number]],FOLLOWUP:[1 week]] PROVIDER:[TOKEN:[[id number]],FOLLOWUP:[1 week]],PROVIDER:[TOKEN:[[id number]],FOLLOWUP:[1 week]],PROVIDER:[TOKEN:[[id number]],FOLLOWUP:[1-3 days]],PROVIDER:[TOKEN:[[id number]],FOLLOWUP:[1 week]],FREE:[LAST:[Dr. Otoole],PHONE:[(   )    -],FAX:[(   )    -],ADDRESS:[podiatry],FOLLOWUP:[1 week]],PROVIDER:[TOKEN:[[id number]],SCHEDULEDAPPT:[01/13/2020],SCHEDULEDAPPTTIME:[01:00 PM]]

## 2020-01-07 NOTE — PROGRESS NOTE ADULT - SUBJECTIVE AND OBJECTIVE BOX
DIABETES FOLLOW UP NOTE: Saw pt earlier today  INTERVAL HX:HX: 63y/o F well known to diabetes team from frequent admissions with SOB/CP. Pt w/h/o uncontrolled TIDM (HbA1c 8.1% 9/19 now 8.7%). DM c/b neuropathy and retinopathy as well as CAD s/p multiple stents, CHF (EF 30%), TIA, PVD s/p b/l fem-pop bypass, ESRD> HD. Recent admission with PNA. Here L foot 2nd digit pain w/ progressive swelling found to have positive fx of 4th L toe and s/p LE  duplex (1/6/20) with severe b/l arterial vascular stenosis and occlusive disease. Per team NP awaiting input from vascular team. Pt reports tolerating POs with glycemic control at goal within the past 24 hours while on present insulin doses. No hypoglycemia. At home family manages DM. Pt reports no SOB. No pain in L foot      Review of Systems:  General: As above  Cardiovascular: No chest pain, palpitations  Respiratory: No SOB, no cough  GI: No nausea, vomiting, abdominal pain  Endocrine: no polyuria, polydipsia or S&Sx of hypoglycemia    Allergies    No Known Allergies    Intolerances      MEDICATIONS:  atorvastatin 20 milliGRAM(s) Oral at bedtime  insulin glargine Injectable (LANTUS) 13 Unit(s) SubCutaneous at bedtime  insulin lispro (HumaLOG) corrective regimen sliding scale   SubCutaneous three times a day before meals  insulin lispro (HumaLOG) corrective regimen sliding scale   SubCutaneous at bedtime  insulin lispro (HumaLOG) corrective regimen sliding scale   SubCutaneous <User Schedule>  insulin lispro Injectable (HumaLOG) 2 Unit(s) SubCutaneous at bedtime  insulin lispro Injectable (HumaLOG) 4 Unit(s) SubCutaneous three times a day before meals      PHYSICAL EXAM:  VITALS: T(C): 37 (01-07-20 @ 04:03)  T(F): 98.6 (01-07-20 @ 04:03), Max: 98.8 (01-06-20 @ 11:59)  HR: 81 (01-07-20 @ 10:51) (74 - 87)  BP: 109/66 (01-07-20 @ 10:51) (94/54 - 155/77)  RR:  (17 - 20)  SpO2:  (95% - 100%)  Wt(kg): --  GENERAL: Female laying in bed in NAD  Abdomen: Soft, nontender, non distended  Extremities: Warm, minimal edema noted in LLE (Chronic but improving), L foot dressing D&I  NEURO: A&O X3    LABS:  POCT Blood Glucose.: 148 mg/dL (01-07-20 @ 08:21)  POCT Blood Glucose.: 183 mg/dL (01-07-20 @ 02:08)  POCT Blood Glucose.: 139 mg/dL (01-06-20 @ 22:55)  POCT Blood Glucose.: 104 mg/dL (01-06-20 @ 16:46)  POCT Blood Glucose.: 130 mg/dL (01-06-20 @ 14:23)  POCT Blood Glucose.: 267 mg/dL (01-06-20 @ 08:10)  POCT Blood Glucose.: 310 mg/dL (01-05-20 @ 21:59)  POCT Blood Glucose.: 204 mg/dL (01-05-20 @ 17:21)  POCT Blood Glucose.: 187 mg/dL (01-05-20 @ 11:40)  POCT Blood Glucose.: 312 mg/dL (01-05-20 @ 07:53)  POCT Blood Glucose.: 258 mg/dL (01-04-20 @ 22:22)  POCT Blood Glucose.: 249 mg/dL (01-04-20 @ 21:30)  POCT Blood Glucose.: 161 mg/dL (01-04-20 @ 17:45)  POCT Blood Glucose.: 160 mg/dL (01-04-20 @ 11:54)                            11.8   6.72  )-----------( 178      ( 07 Jan 2020 09:16 )             38.8       01-07    132<L>  |  91<L>  |  17  ----------------------------<  169<H>  5.0   |  27  |  5.04<H>    EGFR if : 10<L>  EGFR if non : 9<L>    Ca    10.1      01-07    Hemoglobin A1C, Whole Blood: 8.7 % <H> [4.0 - 5.6] (11-13-19 @ 23:44)

## 2020-01-08 LAB
ANION GAP SERPL CALC-SCNC: 18 MMOL/L — HIGH (ref 5–17)
BUN SERPL-MCNC: 38 MG/DL — HIGH (ref 7–23)
CALCIUM SERPL-MCNC: 9.6 MG/DL — SIGNIFICANT CHANGE UP (ref 8.4–10.5)
CHLORIDE SERPL-SCNC: 90 MMOL/L — LOW (ref 96–108)
CO2 SERPL-SCNC: 23 MMOL/L — SIGNIFICANT CHANGE UP (ref 22–31)
CREAT SERPL-MCNC: 7.56 MG/DL — HIGH (ref 0.5–1.3)
CULTURE RESULTS: SIGNIFICANT CHANGE UP
CULTURE RESULTS: SIGNIFICANT CHANGE UP
GLUCOSE BLDC GLUCOMTR-MCNC: 159 MG/DL — HIGH (ref 70–99)
GLUCOSE BLDC GLUCOMTR-MCNC: 203 MG/DL — HIGH (ref 70–99)
GLUCOSE BLDC GLUCOMTR-MCNC: 231 MG/DL — HIGH (ref 70–99)
GLUCOSE BLDC GLUCOMTR-MCNC: 396 MG/DL — HIGH (ref 70–99)
GLUCOSE BLDC GLUCOMTR-MCNC: 421 MG/DL — HIGH (ref 70–99)
GLUCOSE BLDC GLUCOMTR-MCNC: 435 MG/DL — HIGH (ref 70–99)
GLUCOSE SERPL-MCNC: 224 MG/DL — HIGH (ref 70–99)
HCT VFR BLD CALC: 36.1 % — SIGNIFICANT CHANGE UP (ref 34.5–45)
HGB BLD-MCNC: 11.5 G/DL — SIGNIFICANT CHANGE UP (ref 11.5–15.5)
MCHC RBC-ENTMCNC: 31.9 GM/DL — LOW (ref 32–36)
MCHC RBC-ENTMCNC: 32.6 PG — SIGNIFICANT CHANGE UP (ref 27–34)
MCV RBC AUTO: 102.3 FL — HIGH (ref 80–100)
PLATELET # BLD AUTO: 179 K/UL — SIGNIFICANT CHANGE UP (ref 150–400)
POTASSIUM SERPL-MCNC: 5.1 MMOL/L — SIGNIFICANT CHANGE UP (ref 3.5–5.3)
POTASSIUM SERPL-SCNC: 5.1 MMOL/L — SIGNIFICANT CHANGE UP (ref 3.5–5.3)
RBC # BLD: 3.53 M/UL — LOW (ref 3.8–5.2)
RBC # FLD: 13.8 % — SIGNIFICANT CHANGE UP (ref 10.3–14.5)
SODIUM SERPL-SCNC: 131 MMOL/L — LOW (ref 135–145)
SPECIMEN SOURCE: SIGNIFICANT CHANGE UP
SPECIMEN SOURCE: SIGNIFICANT CHANGE UP
WBC # BLD: 5.56 K/UL — SIGNIFICANT CHANGE UP (ref 3.8–10.5)
WBC # FLD AUTO: 5.56 K/UL — SIGNIFICANT CHANGE UP (ref 3.8–10.5)

## 2020-01-08 PROCEDURE — 99232 SBSQ HOSP IP/OBS MODERATE 35: CPT

## 2020-01-08 RX ORDER — OXYCODONE HYDROCHLORIDE 5 MG/1
2.5 TABLET ORAL ONCE
Refills: 0 | Status: DISCONTINUED | OUTPATIENT
Start: 2020-01-08 | End: 2020-01-08

## 2020-01-08 RX ADMIN — Medication 4 UNIT(S): at 19:17

## 2020-01-08 RX ADMIN — Medication 50 MILLIGRAM(S): at 22:14

## 2020-01-08 RX ADMIN — Medication 1 APPLICATION(S): at 12:52

## 2020-01-08 RX ADMIN — Medication 650 MILLIGRAM(S): at 06:21

## 2020-01-08 RX ADMIN — OXYCODONE HYDROCHLORIDE 2.5 MILLIGRAM(S): 5 TABLET ORAL at 06:21

## 2020-01-08 RX ADMIN — Medication 2: at 12:49

## 2020-01-08 RX ADMIN — SEVELAMER CARBONATE 2400 MILLIGRAM(S): 2400 POWDER, FOR SUSPENSION ORAL at 19:18

## 2020-01-08 RX ADMIN — Medication 5: at 08:18

## 2020-01-08 RX ADMIN — SEVELAMER CARBONATE 2400 MILLIGRAM(S): 2400 POWDER, FOR SUSPENSION ORAL at 12:50

## 2020-01-08 RX ADMIN — Medication 650 MILLIGRAM(S): at 15:48

## 2020-01-08 RX ADMIN — Medication 1: at 19:16

## 2020-01-08 RX ADMIN — CLOPIDOGREL BISULFATE 75 MILLIGRAM(S): 75 TABLET, FILM COATED ORAL at 12:50

## 2020-01-08 RX ADMIN — Medication 650 MILLIGRAM(S): at 16:15

## 2020-01-08 RX ADMIN — Medication 0.5 MILLIGRAM(S): at 06:23

## 2020-01-08 RX ADMIN — Medication 4 UNIT(S): at 12:49

## 2020-01-08 RX ADMIN — SEVELAMER CARBONATE 2400 MILLIGRAM(S): 2400 POWDER, FOR SUSPENSION ORAL at 08:17

## 2020-01-08 RX ADMIN — Medication 50 MILLIGRAM(S): at 08:22

## 2020-01-08 RX ADMIN — Medication 2 UNIT(S): at 22:13

## 2020-01-08 RX ADMIN — ALBUTEROL 2 PUFF(S): 90 AEROSOL, METERED ORAL at 06:22

## 2020-01-08 RX ADMIN — Medication 3: at 02:35

## 2020-01-08 RX ADMIN — PANTOPRAZOLE SODIUM 40 MILLIGRAM(S): 20 TABLET, DELAYED RELEASE ORAL at 06:21

## 2020-01-08 RX ADMIN — Medication 81 MILLIGRAM(S): at 12:50

## 2020-01-08 RX ADMIN — ATORVASTATIN CALCIUM 20 MILLIGRAM(S): 80 TABLET, FILM COATED ORAL at 22:14

## 2020-01-08 RX ADMIN — Medication 4 UNIT(S): at 08:19

## 2020-01-08 RX ADMIN — ALBUTEROL 2 PUFF(S): 90 AEROSOL, METERED ORAL at 19:19

## 2020-01-08 RX ADMIN — ALBUTEROL 2 PUFF(S): 90 AEROSOL, METERED ORAL at 12:50

## 2020-01-08 RX ADMIN — Medication 0.5 MILLIGRAM(S): at 19:18

## 2020-01-08 RX ADMIN — INSULIN GLARGINE 13 UNIT(S): 100 INJECTION, SOLUTION SUBCUTANEOUS at 22:13

## 2020-01-08 NOTE — PROGRESS NOTE ADULT - ASSESSMENT
Assessment:  · Assessment		  64F w/ ESRD on HD(M/T/Th/Sa), CAD s/p multiple stents/brachytherapy, mod MR, severe AS, RHF, CHF (EF 30% per last [facility]), DM1 c/b neuropathy and retinopathy, hx of TIA, severe peripheral artery disease s/p b/l fempop bypass c/b left thrombosed graft, left subclavian vein stenosis s/p stent, COPD not on O2, gout, hilar lymphadenopathy of unknown etiology, recent hospitalization for pneumonia d/c 12/9/19 presents with left toe pain and xray appearing c/w Left 2nd and 4th toe fracture incidentally found to have hyperkalemia despite HD on day of admit and therefore admitted for urgent HD w/ podiatry eval of left foot.    Hyperkalemia resolving.   - in patient with ESRD despite HD  - nephrology follow  - received regular insulin in ED and dosed lantus 6U per orders  - f/u BMP in am to monitor, trending down on 2nd bmp  - hold lisinopril.     Closed nondisplaced fracture of distal phalanx of lesser toe of left foot, initial encounter.   - podiatry follow  - noted 2nd toe nail avulsion as well. per podiatry c/w bacitracin  - 2nd and 4th digit appear to have fracture  - given no leukocytosis and ESR on admit would monitor off antibiotics unless indicated by podiatry.     Chronic obstructive pulmonary disease, unspecified COPD type.   - hx of COPD not on home Oxygen    ESRD (end stage renal disease).  - HD per nephrology.     Peripheral artery disease.   - c/w asa and plavix.   - vascular follow. Await recommendations     AVF revision by Vascular    Type 1 diabetes mellitus with hyperglycemia.   - endocrinology follow  - continue w/ home regimen for now lantus 13U and humalog 6U TID premeal w/ ISS.    Chronic systolic congestive heart failure.   - HOLD lisinopril given hyperkalemia  - continue with hydralazine 50 TID per nephrology with holding parameters    Coronary artery disease involving native coronary artery of native heart without angina pectoris.   - c/w asa, clopidogrel, statin  - cardiology follow    DCP home if no AVF revision planned and no Vascular intervention .    Pierce Viera MD pager [phone]

## 2020-01-08 NOTE — PROVIDER CONTACT NOTE (OTHER) - ASSESSMENT
patient alert and oriented x3 asymptomatic/ vs stable
pt asymptomatic; /51, HR 74; denies cp, palpitations, dizziness, SOB
pt stable, vitals stable

## 2020-01-08 NOTE — PROVIDER CONTACT NOTE (OTHER) - BACKGROUND
64F w/ ESRD on HD(M/T/Th/Sa), CAD s/p multiple stents/brachytherapy, mod MR, severe AS, RHF, CHF (EF 30% per last [facility]), DM1 c/b neuropathy and retinopathy,
Admit Dx: Hyperkalemia
pt admit with hyperkalemia

## 2020-01-08 NOTE — PROGRESS NOTE ADULT - ASSESSMENT
63 y/o F  with PMHx of CHF, CAD, DMT1 on insulin, ACS, TIA, CVA, gout, PVD, ESRD on dialysis with multiple admissions  presents today with loss of left 2nd toenail with severe pain four days ago.  Pain gradually worsening. Reports that she woke up with her nail hanging off, has pain on her left 2nd toe. in er noticed with hyperglycemia as well as hyperkalemia with k 6.7 on admission     1- esrd  2- hyperkalemia  3- HTN   4- shpt   5- cad  6- DM       avf dopplers   hd f 180 3.5 hr 1 liter 2 k bath bfr 400 dfr 600   asked vascular today for  follow up for LE ischemia and avf

## 2020-01-08 NOTE — PROGRESS NOTE ADULT - SUBJECTIVE AND OBJECTIVE BOX
Cardiovascular Disease Progress Note    Overnight events: No acute events overnight.  awaiting vascular eval. persistent foot pain. no new cardiac sx  Otherwise review of systems negative    Objective Findings:  T(C): 36.7 (01-08-20 @ 04:29), Max: 36.8 (01-07-20 @ 08:26)  HR: 73 (01-08-20 @ 04:29) (72 - 81)  BP: 101/59 (01-08-20 @ 04:29) (91/55 - 109/66)  RR: 18 (01-08-20 @ 04:29) (16 - 18)  SpO2: 100% (01-08-20 @ 04:29) (97% - 100%)  Wt(kg): --  Daily     Daily       Physical Exam:  Gen: NAD  HEENT: EOMI  CV: RRR, normal S1 + S2, no m/r/g  Lungs: CTAB  Abd: soft, non-tender  Ext: No edema    Telemetry: nsr pvcs    Laboratory Data:                        11.8   6.72  )-----------( 178      ( 07 Jan 2020 09:16 )             38.8     01-07    132<L>  |  91<L>  |  17  ----------------------------<  169<H>  5.0   |  27  |  5.04<H>    Ca    10.1      07 Jan 2020 05:59                Inpatient Medications:  MEDICATIONS  (STANDING):  ALBUTerol    90 MICROgram(s) HFA Inhaler 2 Puff(s) Inhalation every 6 hours  aspirin enteric coated 81 milliGRAM(s) Oral daily  atorvastatin 20 milliGRAM(s) Oral at bedtime  BACItracin   Ointment 1 Application(s) Topical daily  buDESOnide    Inhalation Suspension 0.5 milliGRAM(s) Inhalation two times a day  clopidogrel Tablet 75 milliGRAM(s) Oral daily  dextrose 5%. 1000 milliLiter(s) (50 mL/Hr) IV Continuous <Continuous>  dextrose 50% Injectable 12.5 Gram(s) IV Push once  dextrose 50% Injectable 25 Gram(s) IV Push once  dextrose 50% Injectable 25 Gram(s) IV Push once  heparin  Injectable 5000 Unit(s) SubCutaneous every 8 hours  hydrALAZINE 50 milliGRAM(s) Oral every 8 hours  insulin glargine Injectable (LANTUS) 13 Unit(s) SubCutaneous at bedtime  insulin lispro (HumaLOG) corrective regimen sliding scale   SubCutaneous three times a day before meals  insulin lispro (HumaLOG) corrective regimen sliding scale   SubCutaneous at bedtime  insulin lispro (HumaLOG) corrective regimen sliding scale   SubCutaneous <User Schedule>  insulin lispro Injectable (HumaLOG) 2 Unit(s) SubCutaneous at bedtime  insulin lispro Injectable (HumaLOG) 4 Unit(s) SubCutaneous three times a day before meals  metoprolol succinate ER 50 milliGRAM(s) Oral daily  pantoprazole    Tablet 40 milliGRAM(s) Oral before breakfast  sevelamer carbonate 2400 milliGRAM(s) Oral three times a day      Assessment:  -acute left toe pain  -electrolyte abnormalities  -right lower lobe opacity  -NSVT  -ischemic cardiomyopathy c/b mod to severe LV dysfunction, cad s/p multiple stents  -esrd on hd  -PAD s/p prior intervention       Recs:  -podiatry recs appreciated. humberto and le art duplex results noted. concerning for ischemic etiology. f/u vascular recs  -optimization of volume status and electrolyte abnormalities with hd. renal appreciated. awaiting avf ultrasound  -known extensive CAD and ICM. s/p [facility] 2/20/2019 --> severe and diffuse instent restenosis along RCA --> unable to stent due to multiple layers of stenting and prior brachytherapy. denies any true ischemic sx at present  -c/w beta blockers for nsvt/pat/cad (as above). c/w toprol 50mg. also previously on hydral and lisinopril. would resume lisinopril once hyperkalemia resolves and uptitrate GDMT as tolerates. patient previously declined ICD for primary prevention, will cont to address  -hx of prolonged qtc. avoid qtc prolonging meds  -s/p TTE 2019: mod-severe MR, pseudo AS (low flow-low gradient), mod-severe LV dysfunction --> recent CTS consult with dr hebert appreciated. plan is for medical management given surgical risk and patient preference  -c/w asa, plavix, statin for ischemic cardiomyopathy/CAD/PAD      Over 25 minutes spent on total encounter; more than 50% of the visit was spent counseling and/or coordinating care by the attending physician.      Julián Biswas MD   Cardiovascular Disease  [phone]

## 2020-01-08 NOTE — PROGRESS NOTE ADULT - ASSESSMENT
65y/o F well known to diabetes team from frequent admissions with SOB/CP. Pt w/h/o uncontrolled TIDM c/b neuropathy and retinopathy as well as CAD s/p multiple stents, CHF (EF 30%), TIA, PVD s/p b/l fem-pop bypass, ESRD> HD. Recent admission with PNA. Here L foot 2nd digit pain w/ progressive swelling and positive fx of 4th L toe. S/p LE  duplex (1/6/20) with severe b/l arterial vascular stenosis and occlusive disease. Per team no surgical intervention planned> medically treated. Tolerating POs with glycemic control now above goal due to improved PO intake and nutritional indiscretions. BG 400s this am after pt had crackers with peanut butter last night. Pt didn't get Humalog bedtime dose for snack even though she ate. No hypoglycemia. Spoke to RN to make sure pt receives Humalog at HS if she is eating.

## 2020-01-08 NOTE — PROGRESS NOTE ADULT - SUBJECTIVE AND OBJECTIVE BOX
[facility]   [phone]  DIALYSIS NOTE  Chief Complaint: ESRD/Ongoing hemodialysis requirement. seen on hd    24 hour events/subjective:      c/o left leg pain       ALLERGIES & MEDICATIONS  --------------------------------------------------------------------------------  Allergies    No Known Allergies    Intolerances      Standing Inpatient Medications  ALBUTerol    90 MICROgram(s) HFA Inhaler 2 Puff(s) Inhalation every 6 hours  aspirin enteric coated 81 milliGRAM(s) Oral daily  atorvastatin 20 milliGRAM(s) Oral at bedtime  BACItracin   Ointment 1 Application(s) Topical daily  buDESOnide    Inhalation Suspension 0.5 milliGRAM(s) Inhalation two times a day  clopidogrel Tablet 75 milliGRAM(s) Oral daily  dextrose 5%. 1000 milliLiter(s) IV Continuous <Continuous>  dextrose 50% Injectable 12.5 Gram(s) IV Push once  dextrose 50% Injectable 25 Gram(s) IV Push once  dextrose 50% Injectable 25 Gram(s) IV Push once  heparin  Injectable 5000 Unit(s) SubCutaneous every 8 hours  hydrALAZINE 50 milliGRAM(s) Oral every 8 hours  insulin glargine Injectable (LANTUS) 13 Unit(s) SubCutaneous at bedtime  insulin lispro (HumaLOG) corrective regimen sliding scale   SubCutaneous three times a day before meals  insulin lispro (HumaLOG) corrective regimen sliding scale   SubCutaneous at bedtime  insulin lispro (HumaLOG) corrective regimen sliding scale   SubCutaneous <User Schedule>  insulin lispro Injectable (HumaLOG) 2 Unit(s) SubCutaneous at bedtime  insulin lispro Injectable (HumaLOG) 4 Unit(s) SubCutaneous three times a day before meals  metoprolol succinate ER 50 milliGRAM(s) Oral daily  pantoprazole    Tablet 40 milliGRAM(s) Oral before breakfast  sevelamer carbonate 2400 milliGRAM(s) Oral three times a day    PRN Inpatient Medications  acetaminophen   Tablet .. 650 milliGRAM(s) Oral every 6 hours PRN  dextrose 40% Gel 15 Gram(s) Oral once PRN  glucagon  Injectable 1 milliGRAM(s) IntraMuscular once PRN      REVIEW OF SYSTEMS  --------------------------------------------------------------------------------  no itching or rash  no fever or chill  no cp or palp   no sob or cough   no N/V/D/ no abd pain   ext no edema +  pain         VITALS/PHYSICAL EXAM  --------------------------------------------------------------------------------  T(C): 36.7 (01-08-20 @ 19:08), Max: 36.8 (01-08-20 @ 07:56)  HR: 74 (01-08-20 @ 19:08) (66 - 74)  BP: 118/66 (01-08-20 @ 19:08) (101/59 - 136/56)  RR: 18 (01-08-20 @ 19:08) (18 - 18)  SpO2: 95% (01-08-20 @ 19:08) (95% - 100%)  Wt(kg): --        01-07-20 @ 07:01  -  01-08-20 @ 07:00  --------------------------------------------------------  IN: 240 mL / OUT: 0 mL / NET: 240 mL    01-08-20 @ 07:01  -  01-08-20 @ 22:49  --------------------------------------------------------  IN: 0 mL / OUT: 1000 mL / NET: -1000 mL      Physical Exam:  		  Gen:  comfortable appearing    	no jvd ,  	Pulm: decrease bs  no rales or ronchi or wheezing  	CV: RRR, S1S2; no rub  	Abd: +BS, soft, nontender/nondistended  	: No suprapubic tenderness  	UE: Warm, no cyanosis  no clubbing,  no edema  	LE: Warm, no cyanosis  no clubbing, LLE 1 +   edema  	Neuro: alert and oriented. speech coherent   	Vascular access: LUE + AVF + thrill and bruit   	    LABS/STUDIES  --------------------------------------------------------------------------------              11.5   5.56  >-----------<  179      [01-08-20 @ 18:39]              36.1     131  |  90  |  38  ----------------------------<  224      [01-08-20 @ 15:37]  5.1   |  23  |  7.56        Ca     9.6     [01-08-20 @ 15:37]

## 2020-01-08 NOTE — PROGRESS NOTE ADULT - ASSESSMENT
64F w/ extensive PMHx w/ c/c of left foot 2nd digit pain w/ progressive swelling    - Vitals stable, no leukocytosis, ESR 23  -  left foot 2nd digit s/p traumatic nail avulsion  - L foot x-rays reviewed demonstrating acute fracture of left 4th proximal phalanx shaft & cortical irregularity of 2nd middle phalanx highly suspicious of fracture  - No acute surgical intervention  - WBAT to left foot with surgical shoe  -No need for dressing/local wound care  - Podiatry will continue to follow while in house  - Podiatrically stable for DC     Follow up with Dr. Xiang VRAGAS within 1 week of discharge.  Call for appointment   [place] office: [phone]  [place] office: [phone]

## 2020-01-08 NOTE — PROGRESS NOTE ADULT - PROBLEM SELECTOR PLAN 1
Plan: -test BG AC/HS/2AM  -C/W Lantus 13 units QHS for now.   -c/w Humalog to 4 units AC meals for now (hold if not eating). If BG continue to be persistently >200s will incrwase to 5 units.  -c/w Humalog 2 units w/HS snack if eating. Hold if not eating. NEEDS TO GET THIS DOSE IF EATING AT [facility]!  -c/w Humalog low correction scale (correct >200mg/dl) AC and Low HS scale. c/w custom 2AM scale  inform endocrine of hypoglycemia or persistent hyperglycemia episodes as changes in pts insulin regimen will need to be made.   -Notify endocrine if any plans to be NPO/diet changes as this will also affect insulin regimen  DC plan: basal bolus. final doses TBD  -Pt to f/u with Dr Ley,  pvt endo as out pt. Will need podiatrist/vascular follow up  -Plan discussed with pt/team/RN.  Contact info: [phone] (24/7). pager [phone].

## 2020-01-08 NOTE — PROVIDER CONTACT NOTE (OTHER) - RECOMMENDATIONS
dextrose gel x1 dose given, patient didn't like the taste of the gel ,repeat fs 54,and patient refused to take dextrose gel,  apple juice and apple sauce given as per pt request
Continue to monitor pt
notify PA, continue to monitor

## 2020-01-08 NOTE — PROGRESS NOTE ADULT - SUBJECTIVE AND OBJECTIVE BOX
DIABETES FOLLOW UP NOTE: Saw pt earlier today  INTERVAL HX: 63y/o F well known to diabetes team from frequent admissions with SOB/CP. Pt w/h/o uncontrolled TIDM c/b neuropathy and retinopathy as well as CAD s/p multiple stents, CHF (EF 30%), TIA, PVD s/p b/l fem-pop bypass, ESRD> HD. Recent admission with PNA. Here L foot 2nd digit pain w/ progressive swelling and positive fx of 4th L toe. S/p LE duplex (1/6/20) with severe b/l arterial vascular stenosis and occlusive disease. Per team no surgical intervention planned> medically treated. Tolerating POs with glycemic control now above goal due to improved PO intake and nutritional indiscretions. BG 400s this am after pt had crackers with peanut butter last night. Pt didn't get Humalog bedtime dose for snack even though she ate. No hypoglycemia.       Review of Systems:  General: As above  Cardiovascular: No chest pain, palpitations  Respiratory: No SOB, no cough  GI: No nausea, vomiting, abdominal pain  Endocrine: no polyuria, polydipsia or S&Sx of hypoglycemia    Allergies    No Known Allergies    Intolerances      MEDICATIONS:  atorvastatin 20 milliGRAM(s) Oral at bedtime  insulin glargine Injectable (LANTUS) 13 Unit(s) SubCutaneous at bedtime  insulin lispro (HumaLOG) corrective regimen sliding scale   SubCutaneous three times a day before meals  insulin lispro (HumaLOG) corrective regimen sliding scale   SubCutaneous at bedtime  insulin lispro (HumaLOG) corrective regimen sliding scale   SubCutaneous <User Schedule>  insulin lispro Injectable (HumaLOG) 2 Unit(s) SubCutaneous at bedtime  insulin lispro Injectable (HumaLOG) 4 Unit(s) SubCutaneous three times a day before meals        PHYSICAL EXAM:  VITALS: T(C): 36.6 (01-08-20 @ 15:00)  T(F): 97.8 (01-08-20 @ 15:00), Max: 98.3 (01-08-20 @ 07:56)  HR: 66 (01-08-20 @ 15:00) (66 - 79)  BP: 136/56 (01-08-20 @ 15:00) (91/55 - 136/56)  RR:  (18 - 18)  SpO2:  (97% - 100%)  Wt(kg): --  GENERAL: Female laying in bed in NAD  Abdomen: Soft, nontender, non distended  Extremities: Warm, noted improved edema  in LLE with dressing in L foot D&I  NEURO: A&O X3    LABS:  POCT Blood Glucose.: 231 mg/dL (01-08-20 @ 12:30)  POCT Blood Glucose.: 396 mg/dL (01-08-20 @ 08:13)  POCT Blood Glucose.: 421 mg/dL (01-08-20 @ 08:11)  POCT Blood Glucose.: 435 mg/dL (01-08-20 @ 02:23)  POCT Blood Glucose.: 204 mg/dL (01-07-20 @ 21:24)  POCT Blood Glucose.: 253 mg/dL (01-07-20 @ 16:52)  POCT Blood Glucose.: 232 mg/dL (01-07-20 @ 12:22)  POCT Blood Glucose.: 148 mg/dL (01-07-20 @ 08:21)  POCT Blood Glucose.: 183 mg/dL (01-07-20 @ 02:08)  POCT Blood Glucose.: 139 mg/dL (01-06-20 @ 22:55)  POCT Blood Glucose.: 104 mg/dL (01-06-20 @ 16:46)  POCT Blood Glucose.: 130 mg/dL (01-06-20 @ 14:23)  POCT Blood Glucose.: 267 mg/dL (01-06-20 @ 08:10)  POCT Blood Glucose.: 310 mg/dL (01-05-20 @ 21:59)  POCT Blood Glucose.: 204 mg/dL (01-05-20 @ 17:21)                            11.8   6.72  )-----------( 178      ( 07 Jan 2020 09:16 )             38.8       01-07    132<L>  |  91<L>  |  17  ----------------------------<  169<H>  5.0   |  27  |  5.04<H>    EGFR if : 10<L>  EGFR if non : 9<L>    Ca    10.1      01-07    Hemoglobin A1C, Whole Blood: 8.7 % <H> [4.0 - 5.6] (11-13-19 @ 23:44)

## 2020-01-08 NOTE — PROGRESS NOTE ADULT - SUBJECTIVE AND OBJECTIVE BOX
Patient is a 64y old  Female who presents with a chief complaint of CC:64F p/w acute left toe pain (08 Jan 2020 17:23)      SUBJECTIVE / OVERNIGHT EVENTS: No new complaints.   Review of Systems  chest pain no  palpitations no  sob no  nausea no  headache no    MEDICATIONS  (STANDING):  ALBUTerol    90 MICROgram(s) HFA Inhaler 2 Puff(s) Inhalation every 6 hours  aspirin enteric coated 81 milliGRAM(s) Oral daily  atorvastatin 20 milliGRAM(s) Oral at bedtime  BACItracin   Ointment 1 Application(s) Topical daily  buDESOnide    Inhalation Suspension 0.5 milliGRAM(s) Inhalation two times a day  clopidogrel Tablet 75 milliGRAM(s) Oral daily  dextrose 5%. 1000 milliLiter(s) (50 mL/Hr) IV Continuous <Continuous>  dextrose 50% Injectable 12.5 Gram(s) IV Push once  dextrose 50% Injectable 25 Gram(s) IV Push once  dextrose 50% Injectable 25 Gram(s) IV Push once  heparin  Injectable 5000 Unit(s) SubCutaneous every 8 hours  hydrALAZINE 50 milliGRAM(s) Oral every 8 hours  insulin glargine Injectable (LANTUS) 13 Unit(s) SubCutaneous at bedtime  insulin lispro (HumaLOG) corrective regimen sliding scale   SubCutaneous three times a day before meals  insulin lispro (HumaLOG) corrective regimen sliding scale   SubCutaneous at bedtime  insulin lispro (HumaLOG) corrective regimen sliding scale   SubCutaneous <User Schedule>  insulin lispro Injectable (HumaLOG) 2 Unit(s) SubCutaneous at bedtime  insulin lispro Injectable (HumaLOG) 4 Unit(s) SubCutaneous three times a day before meals  metoprolol succinate ER 50 milliGRAM(s) Oral daily  pantoprazole    Tablet 40 milliGRAM(s) Oral before breakfast  sevelamer carbonate 2400 milliGRAM(s) Oral three times a day    MEDICATIONS  (PRN):  acetaminophen   Tablet .. 650 milliGRAM(s) Oral every 6 hours PRN Mild Pain (1 - 3), Moderate Pain (4 - 6)  dextrose 40% Gel 15 Gram(s) Oral once PRN Blood Glucose LESS THAN 70 milliGRAM(s)/deciliter  glucagon  Injectable 1 milliGRAM(s) IntraMuscular once PRN Glucose LESS THAN 70 milligrams/deciliter      Vital Signs Last 24 Hrs  T(C): 36.7 (08 Jan 2020 19:08), Max: 36.8 (08 Jan 2020 07:56)  T(F): 98 (08 Jan 2020 19:08), Max: 98.3 (08 Jan 2020 07:56)  HR: 74 (08 Jan 2020 19:08) (66 - 74)  BP: 118/66 (08 Jan 2020 19:08) (101/59 - 136/56)  BP(mean): --  RR: 18 (08 Jan 2020 19:08) (18 - 18)  SpO2: 95% (08 Jan 2020 19:08) (95% - 100%)    PHYSICAL EXAM:  GENERAL: NAD, well-developed  HEAD:  Atraumatic, Normocephalic  EYES: EOMI, PERRLA, conjunctiva and sclera clear  NECK: Supple, No JVD  CHEST/LUNG: Clear to auscultation bilaterally; No wheeze  HEART: Regular rate and rhythm; No murmurs, rubs, or gallops  ABDOMEN: Soft, Nontender, Nondistended; Bowel sounds present  EXTREMITIES:  L foot with 2nd toe changes  PSYCH: AAOx3  NEUROLOGY: non-focal  SKIN: No rashes or lesions    LABS:                        11.5   5.56  )-----------( 179      ( 08 Jan 2020 18:39 )             36.1     01-08    131<L>  |  90<L>  |  38<H>  ----------------------------<  224<H>  5.1   |  23  |  7.56<H>    Ca    9.6      08 Jan 2020 15:37                  RADIOLOGY & ADDITIONAL TESTS:    Imaging Personally Reviewed:  < from: VA Duplex Lower Extrem Arterial, Bilat (01.06.20 @ 14:29) >  Impression: There is severe bilateral arterial vascular stenotic and occlusive disease.    On the right, there are flow-limiting stenoses of the distal external iliac artery and common femoral artery.  There is a patent synthetic femoral popliteal arterial graft bypassing an occluded right superficial femoral artery.  There is a high grade, greater than 50% stenosis of the native right popliteal artery.  The right posterior tibial artery is occluded in the distal calf.  There is a severe, greater than 50% stenosis of the anterior tibial artery in the proximal calf.    On the left, there are flow-limiting stenoses of the distal external iliac and common femoral arteries.  There is a severe flow-limiting stenosis of the left deep femoral artery.  There is an occluded synthetic bypass graft and a patent femoral popliteal artery vein graft bypassing the occluded left superficial femoral artery.  There are severe segmental in-graft stenoses in the proximal and proximal to mid left thigh.  There are segmental flow-limiting stenoses of the proximal and distal left posterior tibial artery.  There is a flow-limiting stenosis of the proximal left peroneal artery.    < end of copied text >    Consultant(s) Notes Reviewed:      Care Discussed with Consultants/Other Providers:

## 2020-01-09 LAB
BLD GP AB SCN SERPL QL: NEGATIVE — SIGNIFICANT CHANGE UP
GLUCOSE BLDC GLUCOMTR-MCNC: 146 MG/DL — HIGH (ref 70–99)
GLUCOSE BLDC GLUCOMTR-MCNC: 235 MG/DL — HIGH (ref 70–99)
GLUCOSE BLDC GLUCOMTR-MCNC: 275 MG/DL — HIGH (ref 70–99)
GLUCOSE BLDC GLUCOMTR-MCNC: 276 MG/DL — HIGH (ref 70–99)
GLUCOSE BLDC GLUCOMTR-MCNC: 288 MG/DL — HIGH (ref 70–99)
RH IG SCN BLD-IMP: POSITIVE — SIGNIFICANT CHANGE UP

## 2020-01-09 PROCEDURE — 71045 X-RAY EXAM CHEST 1 VIEW: CPT | Mod: 26

## 2020-01-09 PROCEDURE — 93990 DOPPLER FLOW TESTING: CPT | Mod: 26

## 2020-01-09 PROCEDURE — 99232 SBSQ HOSP IP/OBS MODERATE 35: CPT

## 2020-01-09 RX ORDER — SODIUM CHLORIDE 9 MG/ML
1000 INJECTION, SOLUTION INTRAVENOUS
Refills: 0 | Status: COMPLETED | OUTPATIENT
Start: 2020-01-09 | End: 2020-12-07

## 2020-01-09 RX ORDER — INSULIN LISPRO 100/ML
3 VIAL (ML) SUBCUTANEOUS AT BEDTIME
Refills: 0 | Status: DISCONTINUED | OUTPATIENT
Start: 2020-01-09 | End: 2020-01-10

## 2020-01-09 RX ORDER — INSULIN GLARGINE 100 [IU]/ML
9 INJECTION, SOLUTION SUBCUTANEOUS AT BEDTIME
Refills: 0 | Status: DISCONTINUED | OUTPATIENT
Start: 2020-01-09 | End: 2020-01-10

## 2020-01-09 RX ORDER — OXYCODONE AND ACETAMINOPHEN 5; 325 MG/1; MG/1
1 TABLET ORAL ONCE
Refills: 0 | Status: DISCONTINUED | OUTPATIENT
Start: 2020-01-09 | End: 2020-01-09

## 2020-01-09 RX ORDER — INSULIN GLARGINE 100 [IU]/ML
14 INJECTION, SOLUTION SUBCUTANEOUS AT BEDTIME
Refills: 0 | Status: DISCONTINUED | OUTPATIENT
Start: 2020-01-09 | End: 2020-01-09

## 2020-01-09 RX ADMIN — Medication 4 UNIT(S): at 08:32

## 2020-01-09 RX ADMIN — Medication 81 MILLIGRAM(S): at 12:28

## 2020-01-09 RX ADMIN — INSULIN GLARGINE 9 UNIT(S): 100 INJECTION, SOLUTION SUBCUTANEOUS at 22:15

## 2020-01-09 RX ADMIN — Medication 2: at 12:29

## 2020-01-09 RX ADMIN — Medication 4 UNIT(S): at 12:28

## 2020-01-09 RX ADMIN — ALBUTEROL 2 PUFF(S): 90 AEROSOL, METERED ORAL at 00:02

## 2020-01-09 RX ADMIN — Medication 0.5 MILLIGRAM(S): at 17:28

## 2020-01-09 RX ADMIN — Medication 3: at 08:31

## 2020-01-09 RX ADMIN — CLOPIDOGREL BISULFATE 75 MILLIGRAM(S): 75 TABLET, FILM COATED ORAL at 12:28

## 2020-01-09 RX ADMIN — Medication 1 APPLICATION(S): at 12:28

## 2020-01-09 RX ADMIN — SEVELAMER CARBONATE 2400 MILLIGRAM(S): 2400 POWDER, FOR SUSPENSION ORAL at 12:28

## 2020-01-09 RX ADMIN — OXYCODONE AND ACETAMINOPHEN 1 TABLET(S): 5; 325 TABLET ORAL at 17:53

## 2020-01-09 RX ADMIN — ALBUTEROL 2 PUFF(S): 90 AEROSOL, METERED ORAL at 13:41

## 2020-01-09 RX ADMIN — Medication 50 MILLIGRAM(S): at 22:15

## 2020-01-09 RX ADMIN — ALBUTEROL 2 PUFF(S): 90 AEROSOL, METERED ORAL at 06:15

## 2020-01-09 RX ADMIN — Medication 50 MILLIGRAM(S): at 06:14

## 2020-01-09 RX ADMIN — ALBUTEROL 2 PUFF(S): 90 AEROSOL, METERED ORAL at 17:27

## 2020-01-09 RX ADMIN — Medication 0.5 MILLIGRAM(S): at 06:15

## 2020-01-09 RX ADMIN — Medication 1: at 22:15

## 2020-01-09 RX ADMIN — Medication 4 UNIT(S): at 17:27

## 2020-01-09 RX ADMIN — SEVELAMER CARBONATE 2400 MILLIGRAM(S): 2400 POWDER, FOR SUSPENSION ORAL at 17:27

## 2020-01-09 RX ADMIN — Medication 50 MILLIGRAM(S): at 13:40

## 2020-01-09 RX ADMIN — SEVELAMER CARBONATE 2400 MILLIGRAM(S): 2400 POWDER, FOR SUSPENSION ORAL at 08:33

## 2020-01-09 RX ADMIN — OXYCODONE AND ACETAMINOPHEN 1 TABLET(S): 5; 325 TABLET ORAL at 17:23

## 2020-01-09 RX ADMIN — Medication 3 UNIT(S): at 22:16

## 2020-01-09 RX ADMIN — Medication 1: at 03:01

## 2020-01-09 RX ADMIN — Medication 0: at 17:26

## 2020-01-09 RX ADMIN — Medication 50 MILLIGRAM(S): at 06:15

## 2020-01-09 RX ADMIN — PANTOPRAZOLE SODIUM 40 MILLIGRAM(S): 20 TABLET, DELAYED RELEASE ORAL at 06:14

## 2020-01-09 RX ADMIN — ATORVASTATIN CALCIUM 20 MILLIGRAM(S): 80 TABLET, FILM COATED ORAL at 22:15

## 2020-01-09 NOTE — CHART NOTE - NSCHARTNOTEFT_GEN_A_CORE
Preop Dx: Left leg dry gangrene  Surgeon: Dr. Elizalde  Procedure: Left leg angiogram and AVF fistulogram    Vital Signs Last 24 Hrs  T(C): 36.7 (2020 08:15), Max: 36.7 (2020 19:08)  T(F): 98 (2020 08:15), Max: 98 (2020 19:08)  HR: 80 (2020 08:15) (66 - 80)  BP: 108/64 (2020 08:15) (93/62 - 136/56)  BP(mean): --  RR: 18 (2020 08:15) (18 - 18)  SpO2: 98% (2020 08:15) (95% - 100%)                        11.5   5.56  )-----------( 179      ( 2020 18:39 )             36.1     01-08    131<L>  |  90<L>  |  38<H>  ----------------------------<  224<H>  5.1   |  23  |  7.56<H>    Ca    9.6      2020 15:37        Daily     Daily Weight in k (2020 18:32)    EKG:  < from: 12 Lead ECG (20 @ 21:22) >      Ventricular Rate 72 BPM    Atrial Rate 72 BPM    P-R Interval 136 ms    QRS Duration 84 ms    Q-T Interval 428 ms    QTC Calculation(Bezet) 468 ms    P Axis 74 degrees    R Axis 42 degrees    T Axis -26 degrees    Diagnosis Line NORMAL SINUS RHYTHM  LOW VOLTAGE QRS  ST & T WAVE ABNORMALITY, CONSIDER INFERIOR ISCHEMIA  ABNORMAL ECG    Confirmed by MD DEEPA, JANET ([id number]) on 1/3/2020 11:42:05 PM    < end of copied text >      CXR:   will obtain     Type and Screen:   will obtain , and 1/10      A/P: 64y Female     - OR 01/10/19 for Left leg angiogram and AVF fistulogram with Dr. Elizalde  - NPO past midnight, except medications  - IVF while NPO  - Consent will be obtained  - Medical clearance for OR Preop Dx: Left leg dry gangrene  Surgeon: Dr. Elizalde  Procedure: Left leg angiogram and AVF fistulogram    Vital Signs Last 24 Hrs  T(C): 36.7 (2020 08:15), Max: 36.7 (2020 19:08)  T(F): 98 (2020 08:15), Max: 98 (2020 19:08)  HR: 80 (2020 08:15) (66 - 80)  BP: 108/64 (2020 08:15) (93/62 - 136/56)  BP(mean): --  RR: 18 (2020 08:15) (18 - 18)  SpO2: 98% (2020 08:15) (95% - 100%)                        11.5   5.56  )-----------( 179      ( 2020 18:39 )             36.1     01-08    131<L>  |  90<L>  |  38<H>  ----------------------------<  224<H>  5.1   |  23  |  7.56<H>    Ca    9.6      2020 15:37        Daily     Daily Weight in k (2020 18:32)    EKG:  < from: 12 Lead ECG (20 @ 21:22) >      Ventricular Rate 72 BPM    Atrial Rate 72 BPM    P-R Interval 136 ms    QRS Duration 84 ms    Q-T Interval 428 ms    QTC Calculation(Bezet) 468 ms    P Axis 74 degrees    R Axis 42 degrees    T Axis -26 degrees    Diagnosis Line NORMAL SINUS RHYTHM  LOW VOLTAGE QRS  ST & T WAVE ABNORMALITY, CONSIDER INFERIOR ISCHEMIA  ABNORMAL ECG    Confirmed by MD DEEPA, JANET ([id number]) on 1/3/2020 11:42:05 PM    < end of copied text >      CXR:   < from: Xray Chest 1 View- PORTABLE-Urgent (20 @ 11:52) >      EXAM:  XR CHEST PORTABLE URGENT 1V                          PROCEDURE DATE:  2020      INTERPRETATION:  CLINICAL INFORMATION: Preoperative evaluation.    TECHNIQUE: Frontal radiograph of the chest.    COMPARISON: Chest x-ray from 2020.    FINDINGS:    Clear lungs. No pleural effusions or pneumothorax. Cardiomediastinal silhouette is enlarged. No acute osseous pathology. Left brachiocephalic vein stents.    IMPRESSION:     Clear lungs.    HUMZA ROBERTSON M.D., RADIOLOGY RESIDENT  This document has been electronically signed.  BETO BISHOP M.D., ATTENDING RADIOLOGIST  This document has been electronically signed. 2020  2:27PM      < end of copied text >    Type and Screen:   will obtain , and 1/10      A/P: 64y Female     - OR 01/10/19 for Left leg angiogram and AVF fistulogram with Dr. Elizalde  - NPO past midnight, except medications  - IVF while NPO  - Consent will be obtained  - Medical clearance for OR pending

## 2020-01-09 NOTE — PROGRESS NOTE ADULT - PROBLEM SELECTOR PLAN 1
-test BG AC/HS/2AM  -Increase Lantus 14 units QHS  -c/w Humalog 4 units AC meals for now. Increase Humalog 3 units w/HS snack.   -c/w Humalog low correction scale AC and Low HS/2AM scale  nform endocrine of hypoglycemia or persistent hyperglycemia episodes as changes in pts insulin regimen will need to be made.   -Notify endocrine if any plans to be NPO/diet changes as this will also affect insulin regimen  DC: basal bolus. Lantus 13/H4 w/meals w/family assistance and adjustment at home  Pt to f/u with Dr Ley,  pvt endo as out pt.  -Plan discussed with pt/team.  pager: [phone] -test BG AC/HS/2AM  -NPO tonight. Can give Lantus 9 units tonight. Consider increasing dose once diet advanced.   -c/w Humalog 4 units AC meals for now. Increase Humalog 3 units w/HS snack.   -c/w Humalog low correction scale AC and Low HS/2AM scale  nform endocrine of hypoglycemia or persistent hyperglycemia episodes as changes in pts insulin regimen will need to be made.   -Notify endocrine if any plans to be NPO/diet changes as this will also affect insulin regimen  DC: basal bolus. Lantus 13/H4 w/meals w/family assistance and adjustment at home  Pt to f/u with Dr Ley,  pvt endo as out pt.  -Plan discussed with pt/team.  pager: [phone]

## 2020-01-09 NOTE — PROGRESS NOTE ADULT - SUBJECTIVE AND OBJECTIVE BOX
[facility]   [phone]  RENAL FOLLOW UP NOTE  --------------------------------------------------------------------------------  Chief Complaint:    24 hour events/subjective:    c/o pain foot left     PAST HISTORY  --------------------------------------------------------------------------------  No significant changes to PMH, PSH, FHx, SHx, unless otherwise noted    ALLERGIES & MEDICATIONS  --------------------------------------------------------------------------------  Allergies    No Known Allergies    Intolerances      Standing Inpatient Medications  ALBUTerol    90 MICROgram(s) HFA Inhaler 2 Puff(s) Inhalation every 6 hours  aspirin enteric coated 81 milliGRAM(s) Oral daily  atorvastatin 20 milliGRAM(s) Oral at bedtime  BACItracin   Ointment 1 Application(s) Topical daily  buDESOnide    Inhalation Suspension 0.5 milliGRAM(s) Inhalation two times a day  clopidogrel Tablet 75 milliGRAM(s) Oral daily  dextrose 5%. 1000 milliLiter(s) IV Continuous <Continuous>  dextrose 50% Injectable 12.5 Gram(s) IV Push once  dextrose 50% Injectable 25 Gram(s) IV Push once  dextrose 50% Injectable 25 Gram(s) IV Push once  heparin  Injectable 5000 Unit(s) SubCutaneous every 8 hours  hydrALAZINE 50 milliGRAM(s) Oral every 8 hours  insulin glargine Injectable (LANTUS) 9 Unit(s) SubCutaneous at bedtime  insulin lispro (HumaLOG) corrective regimen sliding scale   SubCutaneous <User Schedule>  insulin lispro (HumaLOG) corrective regimen sliding scale   SubCutaneous three times a day before meals  insulin lispro (HumaLOG) corrective regimen sliding scale   SubCutaneous at bedtime  insulin lispro Injectable (HumaLOG) 3 Unit(s) SubCutaneous at bedtime  insulin lispro Injectable (HumaLOG) 4 Unit(s) SubCutaneous three times a day before meals  lactated ringers. 1000 milliLiter(s) IV Continuous <Continuous>  metoprolol succinate ER 50 milliGRAM(s) Oral daily  pantoprazole    Tablet 40 milliGRAM(s) Oral before breakfast  sevelamer carbonate 2400 milliGRAM(s) Oral three times a day    PRN Inpatient Medications  acetaminophen   Tablet .. 650 milliGRAM(s) Oral every 6 hours PRN  dextrose 40% Gel 15 Gram(s) Oral once PRN  glucagon  Injectable 1 milliGRAM(s) IntraMuscular once PRN      REVIEW OF SYSTEMS  --------------------------------------------------------------------------------    Gen: denies fevers/chills,  CVS: denies chest pain/palpitations  Resp: denies SOB/Cough  GI: Denies N/V/Abd pain  : Denies dysuria    All other systems were reviewed and are negative, except as noted.    VITALS/PHYSICAL EXAM  --------------------------------------------------------------------------------  T(C): 36.7 (01-09-20 @ 20:35), Max: 36.8 (01-09-20 @ 17:30)  HR: 98 (01-09-20 @ 20:35) (70 - 98)  BP: 104/64 (01-09-20 @ 20:35) (93/62 - 120/70)  RR: 18 (01-09-20 @ 20:35) (18 - 18)  SpO2: 97% (01-09-20 @ 20:35) (96% - 100%)  Wt(kg): --        01-08-20 @ 07:01  -  01-09-20 @ 07:00  --------------------------------------------------------  IN: 50 mL / OUT: 1000 mL / NET: -950 mL    01-09-20 @ 07:01  -  01-09-20 @ 21:18  --------------------------------------------------------  IN: 120 mL / OUT: 0 mL / NET: 120 mL      Physical Exam:  	    Gen:  comfortable appearing    	no jvd ,  	Pulm: decrease bs  no rales or ronchi or wheezing  	CV: RRR, S1S2; no rub  	Abd: +BS, soft, nontender/nondistended  	: No suprapubic tenderness  	UE: Warm, no cyanosis  no clubbing,  no edema  	LE: Warm, no cyanosis  no clubbing, LLE no edema  	Neuro: alert and oriented. speech coherent   	Vascular access: LUE + AVF + thrill and bruit       LABS/STUDIES  --------------------------------------------------------------------------------              11.5   5.56  >-----------<  179      [01-08-20 @ 18:39]              36.1     131  |  90  |  38  ----------------------------<  224      [01-08-20 @ 15:37]  5.1   |  23  |  7.56        Ca     9.6     [01-08-20 @ 15:37]            Creatinine Trend:  SCr 7.56 [01-08 @ 15:37]  SCr 5.04 [01-07 @ 05:59]  SCr 7.08 [01-06 @ 05:49]  SCr 4.73 [01-05 @ 06:22]  SCr 6.03 [01-04 @ 06:20]                  Iron 67, TIBC 234, %sat 29      [12-04-19 @ 08:38]  Ferritin 1021      [12-04-19 @ 08:40]  PTH -- (Ca 8.3)      [12-04-19 @ 08:38]   952  PTH -- (Ca 9.6)      [06-17-19 @ 18:06]   177  PTH -- (Ca 7.8)      [03-29-19 @ 20:38]   73  PTH -- (Ca 7.3)      [02-22-19 @ 02:49]   59  HbA1c 8.7      [11-13-19 @ 23:44]  TSH 1.78      [11-14-19 @ 09:15]  Lipid: chol 108, TG 76, HDL 57, LDL 36      [11-14-19 @ 09:16]

## 2020-01-09 NOTE — PROGRESS NOTE ADULT - ASSESSMENT
65y/o F well known to diabetes team from frequent admissions with SOB/CP. Pt w/h/o uncontrolled TIDM  c/b neuropathy and retinopathy as well as CAD s/p multiple stents, CHF (EF 30%), TIA, PVD s/p b/l fem-pop bypass, ESRD> HD. Here w/SOB completed IV abx for pneumonia. Tolerating POs w/BG values above goal. Eating at nighttime, based on BG pattern requiring more insulin at this time. Slow titration 2/2 erratic PO intake, insulin sensitivity and hypoglycemia unawareness. Family assists w/DM care once home. BG goal (100-200mg/dl).

## 2020-01-09 NOTE — PROGRESS NOTE ADULT - ASSESSMENT
65 y/o F  with PMHx of CHF, CAD, DMT1 on insulin, ACS, TIA, CVA, gout, PVD, ESRD on dialysis with multiple admissions  presents today with loss of left 2nd toenail with severe pain four days ago.  Pain gradually worsening. Reports that she woke up with her nail hanging off, has pain on her left 2nd toe. in er noticed with hyperglycemia as well as hyperkalemia with k 6.7 on admission     1- esrd  2- hyperkalemia  3- HTN   4- shpt   5- cad  6- DM       hd am  cont hydralazine   renvela 3 tab with meals  for LE doppler and avf angiogram   check k and lytes in am

## 2020-01-09 NOTE — PROGRESS NOTE ADULT - SUBJECTIVE AND OBJECTIVE BOX
Patient is a 64y old  Female who presents with a chief complaint of CC:64F p/w acute left toe pain (09 Jan 2020 09:54)      SUBJECTIVE / OVERNIGHT EVENTS: Comfortable without new complaints.   Review of Systems  chest pain no  palpitations no  sob no  nausea no  headache no    MEDICATIONS  (STANDING):  ALBUTerol    90 MICROgram(s) HFA Inhaler 2 Puff(s) Inhalation every 6 hours  aspirin enteric coated 81 milliGRAM(s) Oral daily  atorvastatin 20 milliGRAM(s) Oral at bedtime  BACItracin   Ointment 1 Application(s) Topical daily  buDESOnide    Inhalation Suspension 0.5 milliGRAM(s) Inhalation two times a day  clopidogrel Tablet 75 milliGRAM(s) Oral daily  dextrose 5%. 1000 milliLiter(s) (50 mL/Hr) IV Continuous <Continuous>  dextrose 50% Injectable 12.5 Gram(s) IV Push once  dextrose 50% Injectable 25 Gram(s) IV Push once  dextrose 50% Injectable 25 Gram(s) IV Push once  heparin  Injectable 5000 Unit(s) SubCutaneous every 8 hours  hydrALAZINE 50 milliGRAM(s) Oral every 8 hours  insulin glargine Injectable (LANTUS) 9 Unit(s) SubCutaneous at bedtime  insulin lispro (HumaLOG) corrective regimen sliding scale   SubCutaneous <User Schedule>  insulin lispro (HumaLOG) corrective regimen sliding scale   SubCutaneous three times a day before meals  insulin lispro (HumaLOG) corrective regimen sliding scale   SubCutaneous at bedtime  insulin lispro Injectable (HumaLOG) 3 Unit(s) SubCutaneous at bedtime  insulin lispro Injectable (HumaLOG) 4 Unit(s) SubCutaneous three times a day before meals  lactated ringers. 1000 milliLiter(s) (75 mL/Hr) IV Continuous <Continuous>  metoprolol succinate ER 50 milliGRAM(s) Oral daily  pantoprazole    Tablet 40 milliGRAM(s) Oral before breakfast  sevelamer carbonate 2400 milliGRAM(s) Oral three times a day    MEDICATIONS  (PRN):  acetaminophen   Tablet .. 650 milliGRAM(s) Oral every 6 hours PRN Mild Pain (1 - 3), Moderate Pain (4 - 6)  dextrose 40% Gel 15 Gram(s) Oral once PRN Blood Glucose LESS THAN 70 milliGRAM(s)/deciliter  glucagon  Injectable 1 milliGRAM(s) IntraMuscular once PRN Glucose LESS THAN 70 milligrams/deciliter      Vital Signs Last 24 Hrs  T(C): 36.8 (09 Jan 2020 17:30), Max: 36.8 (09 Jan 2020 17:30)  T(F): 98.2 (09 Jan 2020 17:30), Max: 98.2 (09 Jan 2020 17:30)  HR: 79 (09 Jan 2020 17:30) (70 - 80)  BP: 114/65 (09 Jan 2020 17:30) (93/62 - 132/47)  BP(mean): --  RR: 18 (09 Jan 2020 17:30) (18 - 18)  SpO2: 96% (09 Jan 2020 17:30) (95% - 100%)    PHYSICAL EXAM:  GENERAL: NAD, well-developed  HEAD:  Atraumatic, Normocephalic  EYES: EOMI, PERRLA, conjunctiva and sclera clear  NECK: Supple, No JVD  CHEST/LUNG: Clear to auscultation bilaterally; No wheeze  HEART: Regular rate and rhythm; No murmurs, rubs, or gallops  ABDOMEN: Soft, Nontender, Nondistended; Bowel sounds present  EXTREMITIES:  L foot with clean dressing  PSYCH: Anxious  NEUROLOGY: non-focal  SKIN: No rashes or lesions    LABS:                        11.5   5.56  )-----------( 179      ( 08 Jan 2020 18:39 )             36.1     01-08    131<L>  |  90<L>  |  38<H>  ----------------------------<  224<H>  5.1   |  23  |  7.56<H>    Ca    9.6      08 Jan 2020 15:37                  RADIOLOGY & ADDITIONAL TESTS:    Imaging Personally Reviewed:    Consultant(s) Notes Reviewed:      Care Discussed with Consultants/Other Providers:

## 2020-01-09 NOTE — CHART NOTE - NSCHARTNOTEFT_GEN_A_CORE
Discussed w/ SARAH Lester from primary team, regarding documentation of medical clearance and dialyzing the patient first shift tomorrow morning.    Discussed the risks and benefits of the procedure to the patient and her , pt has been consented.    MANPREET [place] PGY-2  Vascular  [phone]

## 2020-01-09 NOTE — PROGRESS NOTE ADULT - SUBJECTIVE AND OBJECTIVE BOX
Cardiovascular Disease Progress Note    Overnight events: No acute events overnight.  awaiting vascular input. no new cardiac sx  Otherwise review of systems negative    Objective Findings:  T(C): 36.6 (20 @ 04:02), Max: 36.8 (20 @ 07:56)  HR: 76 (20 @ 06:13) (66 - 76)  BP: 118/70 (20 @ 06:13) (93/62 - 136/56)  RR: 18 (20 @ 04:02) (18 - 18)  SpO2: 96% (20 @ 04:02) (95% - 100%)  Wt(kg): --  Daily     Daily Weight in k (2020 18:32)      Physical Exam:  Gen: NAD  HEENT: EOMI  CV: RRR, normal S1 + S2, 2/6 heraclio  Lungs: CTAB  Abd: soft, non-tender  Ext: No edema    Telemetry: nsr pvcs    Laboratory Data:                        11.5   5.56  )-----------( 179      ( 2020 18:39 )             36.1         131<L>  |  90<L>  |  38<H>  ----------------------------<  224<H>  5.1   |  23  |  7.56<H>    Ca    9.6      2020 15:37                Inpatient Medications:  MEDICATIONS  (STANDING):  ALBUTerol    90 MICROgram(s) HFA Inhaler 2 Puff(s) Inhalation every 6 hours  aspirin enteric coated 81 milliGRAM(s) Oral daily  atorvastatin 20 milliGRAM(s) Oral at bedtime  BACItracin   Ointment 1 Application(s) Topical daily  buDESOnide    Inhalation Suspension 0.5 milliGRAM(s) Inhalation two times a day  clopidogrel Tablet 75 milliGRAM(s) Oral daily  dextrose 5%. 1000 milliLiter(s) (50 mL/Hr) IV Continuous <Continuous>  dextrose 50% Injectable 12.5 Gram(s) IV Push once  dextrose 50% Injectable 25 Gram(s) IV Push once  dextrose 50% Injectable 25 Gram(s) IV Push once  heparin  Injectable 5000 Unit(s) SubCutaneous every 8 hours  hydrALAZINE 50 milliGRAM(s) Oral every 8 hours  insulin glargine Injectable (LANTUS) 13 Unit(s) SubCutaneous at bedtime  insulin lispro (HumaLOG) corrective regimen sliding scale   SubCutaneous three times a day before meals  insulin lispro (HumaLOG) corrective regimen sliding scale   SubCutaneous at bedtime  insulin lispro (HumaLOG) corrective regimen sliding scale   SubCutaneous <User Schedule>  insulin lispro Injectable (HumaLOG) 2 Unit(s) SubCutaneous at bedtime  insulin lispro Injectable (HumaLOG) 4 Unit(s) SubCutaneous three times a day before meals  metoprolol succinate ER 50 milliGRAM(s) Oral daily  pantoprazole    Tablet 40 milliGRAM(s) Oral before breakfast  sevelamer carbonate 2400 milliGRAM(s) Oral three times a day      Assessment:  -acute left toe pain  -electrolyte abnormalities  -right lower lobe opacity  -NSVT  -ischemic cardiomyopathy c/b mod to severe LV dysfunction, cad s/p multiple stents  -esrd on hd  -PAD s/p prior intervention       Recs:  -podiatry recs appreciated. humberto and le art duplex results noted. concerning for ischemic etiology. f/u vascular recs  -optimization of volume status and electrolyte abnormalities with hd. renal appreciated. awaiting avf ultrasound  -known extensive CAD and ICM. s/p [facility] 2019 --> severe and diffuse instent restenosis along RCA --> unable to stent due to multiple layers of stenting and prior brachytherapy. denies any true ischemic sx at present  -c/w beta blockers for nsvt/pat/cad (as above). c/w toprol 50mg. also previously on hydral and lisinopril. would resume lisinopril once hyperkalemia resolves and uptitrate GDMT as tolerates. patient previously declined ICD for primary prevention, will cont to address  -hx of prolonged qtc. avoid qtc prolonging meds  -s/p TTE 2019: mod-severe MR, pseudo AS (low flow-low gradient), mod-severe LV dysfunction --> recent CTS consult with dr hebert appreciated. plan is for medical management given surgical risk and patient preference  -c/w asa, plavix, statin for ischemic cardiomyopathy/CAD/PAD      Over 25 minutes spent on total encounter; more than 50% of the visit was spent counseling and/or coordinating care by the attending physician.      Julián Biswas MD   Cardiovascular Disease  [phone]

## 2020-01-09 NOTE — PROGRESS NOTE ADULT - SUBJECTIVE AND OBJECTIVE BOX
Diabetes Follow up note:    Chief complaint: f/u T1DM     Interval Hx: Glucose in 200s this AM. Pt reports eating soup last night at bedtime. Tolerating POs. Had HD yesterday and next session tomorrow. Unclear if will be discharged home prior to then.     Review of Systems:  General: no complaints.   GI: Tolerating POs. Denies N/V/D/Abd pain  CV: Denies CP/SOB  ENDO: No S&Sx of hypoglycemia  MEDS:  atorvastatin 20 milliGRAM(s) Oral at bedtime    insulin glargine Injectable (LANTUS) 13 Unit(s) SubCutaneous at bedtime  insulin lispro (HumaLOG) corrective regimen sliding scale   SubCutaneous three times a day before meals  insulin lispro (HumaLOG) corrective regimen sliding scale   SubCutaneous at bedtime  insulin lispro (HumaLOG) corrective regimen sliding scale   SubCutaneous <User Schedule>  insulin lispro Injectable (HumaLOG) 2 Unit(s) SubCutaneous at bedtime  insulin lispro Injectable (HumaLOG) 4 Unit(s) SubCutaneous three times a day before meals      Allergies    No Known Allergies        PE:  General: Female lying in bed. NAD.   Vital Signs Last 24 Hrs  T(C): 36.7 (09 Jan 2020 08:15), Max: 36.7 (08 Jan 2020 19:08)  T(F): 98 (09 Jan 2020 08:15), Max: 98 (08 Jan 2020 19:08)  HR: 80 (09 Jan 2020 08:15) (66 - 80)  BP: 108/64 (09 Jan 2020 08:15) (93/62 - 136/56)  BP(mean): --  RR: 18 (09 Jan 2020 08:15) (18 - 18)  SpO2: 98% (09 Jan 2020 08:15) (95% - 100%)  Abd: Soft, NT,ND,   Extremities: Warm. L AVF fistula w/dsg c/d/i  Neuro: A&O X3    LABS:  POCT Blood Glucose.: 288 mg/dL (01-09-20 @ 08:25)  POCT Blood Glucose.: 276 mg/dL (01-09-20 @ 02:53)  POCT Blood Glucose.: 203 mg/dL (01-08-20 @ 21:36)  POCT Blood Glucose.: 159 mg/dL (01-08-20 @ 19:13)  POCT Blood Glucose.: 231 mg/dL (01-08-20 @ 12:30)  POCT Blood Glucose.: 396 mg/dL (01-08-20 @ 08:13)  POCT Blood Glucose.: 421 mg/dL (01-08-20 @ 08:11)  POCT Blood Glucose.: 435 mg/dL (01-08-20 @ 02:23)  POCT Blood Glucose.: 204 mg/dL (01-07-20 @ 21:24)  POCT Blood Glucose.: 253 mg/dL (01-07-20 @ 16:52)  POCT Blood Glucose.: 232 mg/dL (01-07-20 @ 12:22)  POCT Blood Glucose.: 148 mg/dL (01-07-20 @ 08:21)  POCT Blood Glucose.: 183 mg/dL (01-07-20 @ 02:08)  POCT Blood Glucose.: 139 mg/dL (01-06-20 @ 22:55)  POCT Blood Glucose.: 104 mg/dL (01-06-20 @ 16:46)  POCT Blood Glucose.: 130 mg/dL (01-06-20 @ 14:23)                            11.5   5.56  )-----------( 179      ( 08 Jan 2020 18:39 )             36.1       01-08    131<L>  |  90<L>  |  38<H>  ----------------------------<  224<H>  5.1   |  23  |  7.56<H>    Ca    9.6      08 Jan 2020 15:37      Hemoglobin A1C, Whole Blood: 8.7 % <H> [4.0 - 5.6] (11-13-19 @ 23:44)          Contact number: isabel [phone] or [phone]

## 2020-01-09 NOTE — PROGRESS NOTE ADULT - ASSESSMENT
Assessment:  · Assessment		  64F w/ ESRD on HD(M/T/Th/Sa), CAD s/p multiple stents/brachytherapy, mod MR, severe AS, RHF, CHF (EF 30% per last [facility]), DM1 c/b neuropathy and retinopathy, hx of TIA, severe peripheral artery disease s/p b/l fempop bypass c/b left thrombosed graft, left subclavian vein stenosis s/p stent, COPD not on O2, gout, hilar lymphadenopathy of unknown etiology, recent hospitalization for pneumonia d/c 12/9/19 presents with left toe pain and xray appearing c/w Left 2nd and 4th toe fracture incidentally found to have hyperkalemia despite HD on day of admit and therefore admitted for urgent HD w/ podiatry eval of left foot.    Hyperkalemia resolving.   - in patient with ESRD despite HD  - nephrology follow  - received regular insulin in ED and dosed lantus 6U per orders  - f/u BMP in am to monitor, trending down on 2nd bmp  - hold lisinopril.     Closed nondisplaced fracture of distal phalanx of lesser toe of left foot, initial encounter.   - podiatry follow  - noted 2nd toe nail avulsion as well. per podiatry c/w bacitracin  - 2nd and 4th digit appear to have fracture  - given no leukocytosis and ESR on admit would monitor off antibiotics unless indicated by podiatry.     Chronic obstructive pulmonary disease, unspecified COPD type.   - hx of COPD not on home Oxygen    ESRD (end stage renal disease).  - HD per nephrology.     Peripheral artery disease.   - c/w asa and plavix.   - vascular follow.   - angiogram pending      AVF revision by Vascular pending     Type 1 diabetes mellitus with hyperglycemia.   - endocrinology follow  - continue w/ home regimen for now lantus 13U and humalog 6U TID premeal w/ ISS.    Chronic systolic congestive heart failure.   - HOLD lisinopril given hyperkalemia  - continue with hydralazine 50 TID per nephrology with holding parameters    Coronary artery disease involving native coronary artery of native heart without angina pectoris.   - c/w asa, clopidogrel, statin  - cardiology follow. Clearance and recommendations for procedure pending     No acute medical condition identified to postpone planned surgical intervention.     DCP home.     Pierce Viera MD pager [phone]

## 2020-01-09 NOTE — PROGRESS NOTE ADULT - ASSESSMENT
64F w/ extensive PMHx w/ c/c of left foot 2nd digit pain w/ progressive swelling    - Vitals stable, no leukocytosis, ESR 23  -  left foot 2nd digit s/p traumatic nail avulsion  - L foot x-rays reviewed demonstrating acute fracture of left 4th proximal phalanx shaft & cortical irregularity of 2nd middle phalanx highly suspicious of fracture  - No acute surgical intervention  - WBAT to left foot with surgical shoe  -No need for dressing/local wound care  - Podiatry will continue to follow while in house  - pt for angio per vasc    Follow up with Dr. Otoole DPM within 1 week of discharge.  Call for appointment   [place] office: [phone]  [place] office: [phone]

## 2020-01-10 ENCOUNTER — TRANSCRIPTION ENCOUNTER (OUTPATIENT)
Age: 65
End: 2020-01-10

## 2020-01-10 VITALS
OXYGEN SATURATION: 100 % | RESPIRATION RATE: 18 BRPM | TEMPERATURE: 98 F | HEART RATE: 75 BPM | DIASTOLIC BLOOD PRESSURE: 66 MMHG | SYSTOLIC BLOOD PRESSURE: 113 MMHG

## 2020-01-10 LAB
ANION GAP SERPL CALC-SCNC: 17 MMOL/L — SIGNIFICANT CHANGE UP (ref 5–17)
APTT BLD: 29.4 SEC — SIGNIFICANT CHANGE UP (ref 27.5–36.3)
BLD GP AB SCN SERPL QL: NEGATIVE — SIGNIFICANT CHANGE UP
BUN SERPL-MCNC: 46 MG/DL — HIGH (ref 7–23)
CALCIUM SERPL-MCNC: 9.7 MG/DL — SIGNIFICANT CHANGE UP (ref 8.4–10.5)
CHLORIDE SERPL-SCNC: 93 MMOL/L — LOW (ref 96–108)
CO2 SERPL-SCNC: 23 MMOL/L — SIGNIFICANT CHANGE UP (ref 22–31)
CREAT SERPL-MCNC: 7.17 MG/DL — HIGH (ref 0.5–1.3)
GLUCOSE BLDC GLUCOMTR-MCNC: 158 MG/DL — HIGH (ref 70–99)
GLUCOSE BLDC GLUCOMTR-MCNC: 225 MG/DL — HIGH (ref 70–99)
GLUCOSE BLDC GLUCOMTR-MCNC: 289 MG/DL — HIGH (ref 70–99)
GLUCOSE BLDC GLUCOMTR-MCNC: 298 MG/DL — HIGH (ref 70–99)
GLUCOSE SERPL-MCNC: 259 MG/DL — HIGH (ref 70–99)
HCT VFR BLD CALC: 35.6 % — SIGNIFICANT CHANGE UP (ref 34.5–45)
HGB BLD-MCNC: 11.2 G/DL — LOW (ref 11.5–15.5)
INR BLD: 0.92 RATIO — SIGNIFICANT CHANGE UP (ref 0.88–1.16)
MAGNESIUM SERPL-MCNC: 2.2 MG/DL — SIGNIFICANT CHANGE UP (ref 1.6–2.6)
MCHC RBC-ENTMCNC: 31.5 GM/DL — LOW (ref 32–36)
MCHC RBC-ENTMCNC: 32.3 PG — SIGNIFICANT CHANGE UP (ref 27–34)
MCV RBC AUTO: 102.6 FL — HIGH (ref 80–100)
PHOSPHATE SERPL-MCNC: 5.6 MG/DL — HIGH (ref 2.5–4.5)
PLATELET # BLD AUTO: 208 K/UL — SIGNIFICANT CHANGE UP (ref 150–400)
POTASSIUM SERPL-MCNC: 5.5 MMOL/L — HIGH (ref 3.5–5.3)
POTASSIUM SERPL-SCNC: 5.5 MMOL/L — HIGH (ref 3.5–5.3)
PROTHROM AB SERPL-ACNC: 10.6 SEC — SIGNIFICANT CHANGE UP (ref 10–13.1)
RBC # BLD: 3.47 M/UL — LOW (ref 3.8–5.2)
RBC # FLD: 13.7 % — SIGNIFICANT CHANGE UP (ref 10.3–14.5)
RH IG SCN BLD-IMP: POSITIVE — SIGNIFICANT CHANGE UP
SODIUM SERPL-SCNC: 133 MMOL/L — LOW (ref 135–145)
WBC # BLD: 5.9 K/UL — SIGNIFICANT CHANGE UP (ref 3.8–10.5)
WBC # FLD AUTO: 5.9 K/UL — SIGNIFICANT CHANGE UP (ref 3.8–10.5)

## 2020-01-10 PROCEDURE — 83605 ASSAY OF LACTIC ACID: CPT

## 2020-01-10 PROCEDURE — 96374 THER/PROPH/DIAG INJ IV PUSH: CPT

## 2020-01-10 PROCEDURE — 99238 HOSP IP/OBS DSCHRG MGMT 30/<: CPT

## 2020-01-10 PROCEDURE — 99232 SBSQ HOSP IP/OBS MODERATE 35: CPT

## 2020-01-10 PROCEDURE — 93005 ELECTROCARDIOGRAM TRACING: CPT

## 2020-01-10 PROCEDURE — 84132 ASSAY OF SERUM POTASSIUM: CPT

## 2020-01-10 PROCEDURE — 85652 RBC SED RATE AUTOMATED: CPT

## 2020-01-10 PROCEDURE — 82962 GLUCOSE BLOOD TEST: CPT

## 2020-01-10 PROCEDURE — 82435 ASSAY OF BLOOD CHLORIDE: CPT

## 2020-01-10 PROCEDURE — 85014 HEMATOCRIT: CPT

## 2020-01-10 PROCEDURE — 84520 ASSAY OF UREA NITROGEN: CPT

## 2020-01-10 PROCEDURE — 73630 X-RAY EXAM OF FOOT: CPT

## 2020-01-10 PROCEDURE — 85027 COMPLETE CBC AUTOMATED: CPT

## 2020-01-10 PROCEDURE — 94640 AIRWAY INHALATION TREATMENT: CPT

## 2020-01-10 PROCEDURE — 86901 BLOOD TYPING SEROLOGIC RH(D): CPT

## 2020-01-10 PROCEDURE — 86704 HEP B CORE ANTIBODY TOTAL: CPT

## 2020-01-10 PROCEDURE — 71045 X-RAY EXAM CHEST 1 VIEW: CPT

## 2020-01-10 PROCEDURE — 99285 EMERGENCY DEPT VISIT HI MDM: CPT | Mod: 25

## 2020-01-10 PROCEDURE — 86850 RBC ANTIBODY SCREEN: CPT

## 2020-01-10 PROCEDURE — 84100 ASSAY OF PHOSPHORUS: CPT

## 2020-01-10 PROCEDURE — 87040 BLOOD CULTURE FOR BACTERIA: CPT

## 2020-01-10 PROCEDURE — 86900 BLOOD TYPING SEROLOGIC ABO: CPT

## 2020-01-10 PROCEDURE — 85610 PROTHROMBIN TIME: CPT

## 2020-01-10 PROCEDURE — 86140 C-REACTIVE PROTEIN: CPT

## 2020-01-10 PROCEDURE — 80053 COMPREHEN METABOLIC PANEL: CPT

## 2020-01-10 PROCEDURE — 99261: CPT

## 2020-01-10 PROCEDURE — 84295 ASSAY OF SERUM SODIUM: CPT

## 2020-01-10 PROCEDURE — 96375 TX/PRO/DX INJ NEW DRUG ADDON: CPT

## 2020-01-10 PROCEDURE — 87340 HEPATITIS B SURFACE AG IA: CPT

## 2020-01-10 PROCEDURE — 93990 DOPPLER FLOW TESTING: CPT

## 2020-01-10 PROCEDURE — 82803 BLOOD GASES ANY COMBINATION: CPT

## 2020-01-10 PROCEDURE — 82947 ASSAY GLUCOSE BLOOD QUANT: CPT

## 2020-01-10 PROCEDURE — 93923 UPR/LXTR ART STDY 3+ LVLS: CPT

## 2020-01-10 PROCEDURE — 82010 KETONE BODYS QUAN: CPT

## 2020-01-10 PROCEDURE — 97161 PT EVAL LOW COMPLEX 20 MIN: CPT

## 2020-01-10 PROCEDURE — 85730 THROMBOPLASTIN TIME PARTIAL: CPT

## 2020-01-10 PROCEDURE — 80048 BASIC METABOLIC PNL TOTAL CA: CPT

## 2020-01-10 PROCEDURE — 93925 LOWER EXTREMITY STUDY: CPT

## 2020-01-10 PROCEDURE — 82330 ASSAY OF CALCIUM: CPT

## 2020-01-10 PROCEDURE — 86706 HEP B SURFACE ANTIBODY: CPT

## 2020-01-10 PROCEDURE — 83735 ASSAY OF MAGNESIUM: CPT

## 2020-01-10 RX ORDER — SODIUM CHLORIDE 9 MG/ML
1000 INJECTION, SOLUTION INTRAVENOUS
Refills: 0 | Status: DISCONTINUED | OUTPATIENT
Start: 2020-01-10 | End: 2020-01-10

## 2020-01-10 RX ORDER — INSULIN GLARGINE 100 [IU]/ML
13 INJECTION, SOLUTION SUBCUTANEOUS AT BEDTIME
Refills: 0 | Status: DISCONTINUED | OUTPATIENT
Start: 2020-01-10 | End: 2020-01-10

## 2020-01-10 RX ORDER — HYDRALAZINE HCL 50 MG
1 TABLET ORAL
Qty: 90 | Refills: 0
Start: 2020-01-10 | End: 2020-02-08

## 2020-01-10 RX ORDER — LISINOPRIL 2.5 MG/1
1 TABLET ORAL
Qty: 0 | Refills: 0 | DISCHARGE

## 2020-01-10 RX ORDER — INSULIN LISPRO 100/ML
4 VIAL (ML) SUBCUTANEOUS AT BEDTIME
Refills: 0 | Status: DISCONTINUED | OUTPATIENT
Start: 2020-01-10 | End: 2020-01-10

## 2020-01-10 RX ORDER — OXYCODONE AND ACETAMINOPHEN 5; 325 MG/1; MG/1
1 TABLET ORAL ONCE
Refills: 0 | Status: DISCONTINUED | OUTPATIENT
Start: 2020-01-10 | End: 2020-01-10

## 2020-01-10 RX ADMIN — Medication 0.5 MILLIGRAM(S): at 17:05

## 2020-01-10 RX ADMIN — ALBUTEROL 2 PUFF(S): 90 AEROSOL, METERED ORAL at 06:17

## 2020-01-10 RX ADMIN — Medication 50 MILLIGRAM(S): at 14:00

## 2020-01-10 RX ADMIN — SEVELAMER CARBONATE 2400 MILLIGRAM(S): 2400 POWDER, FOR SUSPENSION ORAL at 17:04

## 2020-01-10 RX ADMIN — ALBUTEROL 2 PUFF(S): 90 AEROSOL, METERED ORAL at 00:07

## 2020-01-10 RX ADMIN — SODIUM CHLORIDE 75 MILLILITER(S): 9 INJECTION, SOLUTION INTRAVENOUS at 07:49

## 2020-01-10 RX ADMIN — Medication 0.5 MILLIGRAM(S): at 06:16

## 2020-01-10 RX ADMIN — ALBUTEROL 2 PUFF(S): 90 AEROSOL, METERED ORAL at 13:59

## 2020-01-10 RX ADMIN — OXYCODONE AND ACETAMINOPHEN 1 TABLET(S): 5; 325 TABLET ORAL at 07:26

## 2020-01-10 RX ADMIN — Medication 4 UNIT(S): at 17:05

## 2020-01-10 RX ADMIN — SEVELAMER CARBONATE 2400 MILLIGRAM(S): 2400 POWDER, FOR SUSPENSION ORAL at 14:00

## 2020-01-10 RX ADMIN — ALBUTEROL 2 PUFF(S): 90 AEROSOL, METERED ORAL at 17:06

## 2020-01-10 RX ADMIN — Medication 1: at 12:08

## 2020-01-10 RX ADMIN — Medication 81 MILLIGRAM(S): at 13:59

## 2020-01-10 RX ADMIN — CLOPIDOGREL BISULFATE 75 MILLIGRAM(S): 75 TABLET, FILM COATED ORAL at 14:03

## 2020-01-10 RX ADMIN — Medication 3: at 17:05

## 2020-01-10 RX ADMIN — Medication 50 MILLIGRAM(S): at 13:59

## 2020-01-10 RX ADMIN — Medication 1 APPLICATION(S): at 14:02

## 2020-01-10 RX ADMIN — OXYCODONE AND ACETAMINOPHEN 1 TABLET(S): 5; 325 TABLET ORAL at 08:24

## 2020-01-10 RX ADMIN — PANTOPRAZOLE SODIUM 40 MILLIGRAM(S): 20 TABLET, DELAYED RELEASE ORAL at 06:16

## 2020-01-10 NOTE — PROGRESS NOTE ADULT - PROBLEM SELECTOR PLAN 1
-test BG AC/HS/2AM  -Restart Lantus 13 units q hs when eating and give 10 units for NPO  -c/w Humalog 4 units AC meals for now. Increase Humalog 4 units w/HS snack.   -c/w Humalog low correction scale AC and Low HS/2AM scale  -Inform endocrine of hypoglycemia or persistent hyperglycemia episodes as changes in pts insulin regimen will need to be made.   -Notify endocrine if any plans to be NPO/diet changes as this will also affect insulin regimen  DC: basal bolus. Lantus 13/H4 w/meals w/family assistance and adjustment at home  Pt to f/u with Dr Ley,  pvt endo as out pt.  -Plan discussed with pt/team.  Contact info: [phone] (24/7). pager [phone]

## 2020-01-10 NOTE — PROGRESS NOTE ADULT - SUBJECTIVE AND OBJECTIVE BOX
DIABETES FOLLOW UP NOTE: Saw pt earlier today  INTERVAL HX: 63y/o F well known to diabetes team from frequent admissions with SOB/CP. Pt w/h/o uncontrolled TIDM  c/b neuropathy and retinopathy as well as CAD s/p multiple stents, CHF (EF 30%), TIA, PVD s/p b/l fem-pop bypass, ESRD> HD. Recent admission with PNA. Here L foot 2nd digit pain w/ progressive swelling and positive fx of 4th L toe. S/p LE duplex (1/6/20) with severe b/l arterial vascular stenosis and occlusive disease. NPO and per team pt going for fistulogram and  L angio today. Saw pt while having HD. Tolerating POs with glycemic control above goal due to improved PO intake and nutritional indiscretions. Pt states she eats every night cookies or whatever they have in the unit to eat. BGs persistently elevated at night for the past 3 nights. No hypoglycemia. Denies any pain and is sleepy at time of visit.       Review of Systems:  General: As above  Cardiovascular: No chest pain, palpitations  Respiratory: No SOB, no cough  GI: No nausea, vomiting, abdominal pain  Endocrine: no polyuria, polydipsia or S&Sx of hypoglycemia    Allergies    No Known Allergies    Intolerances      MEDICATIONS:  atorvastatin 20 milliGRAM(s) Oral at bedtime  insulin glargine Injectable (LANTUS) 9 Unit(s) SubCutaneous at bedtime  insulin lispro (HumaLOG) corrective regimen sliding scale   SubCutaneous <User Schedule>  insulin lispro (HumaLOG) corrective regimen sliding scale   SubCutaneous three times a day before meals  insulin lispro (HumaLOG) corrective regimen sliding scale   SubCutaneous at bedtime  insulin lispro Injectable (HumaLOG) 3 Unit(s) SubCutaneous at bedtime  insulin lispro Injectable (HumaLOG) 4 Unit(s) SubCutaneous three times a day before meals      PHYSICAL EXAM:  VITALS: T(C): 36.6 (01-10-20 @ 13:40)  T(F): 97.8 (01-10-20 @ 13:40), Max: 98.4 (01-10-20 @ 00:19)  HR: 75 (01-10-20 @ 13:40) (62 - 98)  BP: 113/66 (01-10-20 @ 13:40) (104/64 - 120/44)  RR:  (18 - 18)  SpO2:  (96% - 100%)  Wt(kg): --  GENERAL: Female layinh in bed in NAD. Having HD  Abdomen: Soft, nontender, non distended  Extremities: Warm, No edema in all exts except some in LLE but improved greatly  NEURO: A&O X3. Sleepy today    LABS:  POCT Blood Glucose.: 158 mg/dL (01-10-20 @ 11:33)  POCT Blood Glucose.: 225 mg/dL (01-10-20 @ 07:51)  POCT Blood Glucose.: 289 mg/dL (01-10-20 @ 03:15)  POCT Blood Glucose.: 275 mg/dL (01-09-20 @ 21:34)  POCT Blood Glucose.: 146 mg/dL (01-09-20 @ 16:42)  POCT Blood Glucose.: 235 mg/dL (01-09-20 @ 12:25)  POCT Blood Glucose.: 288 mg/dL (01-09-20 @ 08:25)  POCT Blood Glucose.: 276 mg/dL (01-09-20 @ 02:53)  POCT Blood Glucose.: 203 mg/dL (01-08-20 @ 21:36)  POCT Blood Glucose.: 159 mg/dL (01-08-20 @ 19:13)  POCT Blood Glucose.: 231 mg/dL (01-08-20 @ 12:30)  POCT Blood Glucose.: 396 mg/dL (01-08-20 @ 08:13)  POCT Blood Glucose.: 421 mg/dL (01-08-20 @ 08:11)  POCT Blood Glucose.: 435 mg/dL (01-08-20 @ 02:23)  POCT Blood Glucose.: 204 mg/dL (01-07-20 @ 21:24)  POCT Blood Glucose.: 253 mg/dL (01-07-20 @ 16:52)                            11.2   5.90  )-----------( 208      ( 10 Jah 2020 12:33 )             35.6       01-10    133<L>  |  93<L>  |  46<H>  ----------------------------<  259<H>  5.5<H>   |  23  |  7.17<H>      EGFR if non : 6<L>    Ca    9.7      01-10  Mg     2.2     01-10  Phos  5.6     01-10    Hemoglobin A1C, Whole Blood: 8.7 % <H> [4.0 - 5.6] (11-13-19 @ 23:44)

## 2020-01-10 NOTE — PROGRESS NOTE ADULT - SUBJECTIVE AND OBJECTIVE BOX
Cardiovascular Disease Progress Note    Overnight events: No acute events overnight.  for le angio and fistulogram today. no new cardiac sx  Otherwise review of systems negative    Objective Findings:  T(C): 36.7 (01-10-20 @ 04:44), Max: 36.9 (01-10-20 @ 00:19)  HR: 71 (01-10-20 @ 04:44) (71 - 98)  BP: 114/65 (01-10-20 @ 04:44) (104/64 - 120/70)  RR: 18 (01-10-20 @ 04:44) (18 - 18)  SpO2: 100% (01-10-20 @ 04:44) (96% - 100%)  Wt(kg): --  Daily     Daily       Physical Exam:  Gen: NAD  HEENT: EOMI  CV: RRR, normal S1 + S2, no m/r/g  Lungs: CTAB  Abd: soft, non-tender  Ext: No edema    Telemetry: nsr    Laboratory Data:                        11.5   5.56  )-----------( 179      ( 08 Jan 2020 18:39 )             36.1     01-08    131<L>  |  90<L>  |  38<H>  ----------------------------<  224<H>  5.1   |  23  |  7.56<H>    Ca    9.6      08 Jan 2020 15:37                Inpatient Medications:  MEDICATIONS  (STANDING):  ALBUTerol    90 MICROgram(s) HFA Inhaler 2 Puff(s) Inhalation every 6 hours  aspirin enteric coated 81 milliGRAM(s) Oral daily  atorvastatin 20 milliGRAM(s) Oral at bedtime  BACItracin   Ointment 1 Application(s) Topical daily  buDESOnide    Inhalation Suspension 0.5 milliGRAM(s) Inhalation two times a day  clopidogrel Tablet 75 milliGRAM(s) Oral daily  dextrose 5%. 1000 milliLiter(s) (50 mL/Hr) IV Continuous <Continuous>  dextrose 50% Injectable 12.5 Gram(s) IV Push once  dextrose 50% Injectable 25 Gram(s) IV Push once  dextrose 50% Injectable 25 Gram(s) IV Push once  heparin  Injectable 5000 Unit(s) SubCutaneous every 8 hours  hydrALAZINE 50 milliGRAM(s) Oral every 8 hours  insulin glargine Injectable (LANTUS) 9 Unit(s) SubCutaneous at bedtime  insulin lispro (HumaLOG) corrective regimen sliding scale   SubCutaneous <User Schedule>  insulin lispro (HumaLOG) corrective regimen sliding scale   SubCutaneous three times a day before meals  insulin lispro (HumaLOG) corrective regimen sliding scale   SubCutaneous at bedtime  insulin lispro Injectable (HumaLOG) 3 Unit(s) SubCutaneous at bedtime  insulin lispro Injectable (HumaLOG) 4 Unit(s) SubCutaneous three times a day before meals  lactated ringers. 1000 milliLiter(s) (75 mL/Hr) IV Continuous <Continuous>  metoprolol succinate ER 50 milliGRAM(s) Oral daily  oxycodone    5 mG/acetaminophen 325 mG 1 Tablet(s) Oral once  pantoprazole    Tablet 40 milliGRAM(s) Oral before breakfast  sevelamer carbonate 2400 milliGRAM(s) Oral three times a day      Assessment:  -acute left toe pain  -electrolyte abnormalities  -right lower lobe opacity  -NSVT  -ischemic cardiomyopathy c/b mod to severe LV dysfunction, cad s/p multiple stents  -esrd on hd  -PAD s/p prior intervention       Recs:  -podiatry recs appreciated. humberto and le art duplex results noted. concerning for ischemic etiology. f/u vascular recs --> for le angio and fistulogram today.   patient with significant cardiac hx including valvular disease, biventricular dysfunction and atherosclerotic heart disease. she currently appears euvolemic without any overt ischemic or hf sx. her ekg remains at baseline without any acute ischemic changes and she has demonstrated no sustained arrhythmogenic events. she remains at elevated but optimized cardiac risk and can proceed with peripheral vascular procedures without further cardiac workup. cont with cardiac meds perioperatively.  -optimization of volume status and electrolyte abnormalities with hd. renal appreciated. awaiting avf ultrasound  -known extensive CAD and ICM. s/p [facility] 2/20/2019 --> severe and diffuse instent restenosis along RCA --> unable to stent due to multiple layers of stenting and prior brachytherapy. denies any true ischemic sx at present  -c/w beta blockers for nsvt/pat/cad (as above). c/w toprol 50mg. also previously on hydral and lisinopril. would resume lisinopril once hyperkalemia resolves and uptitrate GDMT as tolerates. patient previously declined ICD for primary prevention, will cont to address  -hx of prolonged qtc. avoid qtc prolonging meds  -s/p TTE 2019: mod-severe MR, pseudo AS (low flow-low gradient), mod-severe LV dysfunction --> recent CTS consult with dr hebert appreciated. plan is for medical management given surgical risk and patient preference  -c/w asa, plavix, statin for ischemic cardiomyopathy/CAD/PAD        Over 25 minutes spent on total encounter; more than 50% of the visit was spent counseling and/or coordinating care by the attending physician.      Julián Biswas MD   Cardiovascular Disease  [phone]

## 2020-01-10 NOTE — PROGRESS NOTE ADULT - ASSESSMENT
63 y/o F  with PMHx of CHF, CAD, DMT1 on insulin, ACS, TIA, CVA, gout, PVD, ESRD on dialysis with multiple admissions  presents today with loss of left 2nd toenail with severe pain four days ago.  Pain gradually worsening. Reports that she woke up with her nail hanging off, has pain on her left 2nd toe. in er noticed with hyperglycemia as well as hyperkalemia with k 6.7 on admission     1- esrd  2- hyperkalemia  3- HTN   4- shpt   5- cad  6- DM     hd f 180 3.5 hr 1 liter 2 k bath bfr 400 dfr 600   see hd flow sheet  vascular will perform angio leg and avf as outpt on 1/13  as i have been informed  next hd in am inpt vs outpt

## 2020-01-10 NOTE — PROGRESS NOTE ADULT - REASON FOR ADMISSION
CC:64F p/w acute left toe pain
acute left toe pain

## 2020-01-10 NOTE — DISCHARGE NOTE NURSING/CASE MANAGEMENT/SOCIAL WORK - PATIENT PORTAL LINK FT
You can access the FollowMyHealth Patient Portal offered by [facility] by registering at the following website: [email or web address]. By joining [employer]’s FollowMyHealth portal, you will also be able to view your health information using other applications (apps) compatible with our system.

## 2020-01-10 NOTE — PROGRESS NOTE ADULT - SUBJECTIVE AND OBJECTIVE BOX
Patient is a 64y old  Female who presents with a chief complaint of CC:64F p/w acute left toe pain (10 Jah 2020 14:35)      SUBJECTIVE / OVERNIGHT EVENTS: Comfortable without new complaints.  at bedside.  Review of Systems  chest pain no  palpitations no  sob no  nausea no  headache no    MEDICATIONS  (STANDING):  ALBUTerol    90 MICROgram(s) HFA Inhaler 2 Puff(s) Inhalation every 6 hours  aspirin enteric coated 81 milliGRAM(s) Oral daily  atorvastatin 20 milliGRAM(s) Oral at bedtime  BACItracin   Ointment 1 Application(s) Topical daily  buDESOnide    Inhalation Suspension 0.5 milliGRAM(s) Inhalation two times a day  clopidogrel Tablet 75 milliGRAM(s) Oral daily  dextrose 5%. 1000 milliLiter(s) (50 mL/Hr) IV Continuous <Continuous>  dextrose 50% Injectable 12.5 Gram(s) IV Push once  dextrose 50% Injectable 25 Gram(s) IV Push once  dextrose 50% Injectable 25 Gram(s) IV Push once  heparin  Injectable 5000 Unit(s) SubCutaneous every 8 hours  hydrALAZINE 50 milliGRAM(s) Oral every 8 hours  insulin glargine Injectable (LANTUS) 13 Unit(s) SubCutaneous at bedtime  insulin lispro (HumaLOG) corrective regimen sliding scale   SubCutaneous <User Schedule>  insulin lispro (HumaLOG) corrective regimen sliding scale   SubCutaneous three times a day before meals  insulin lispro (HumaLOG) corrective regimen sliding scale   SubCutaneous at bedtime  insulin lispro Injectable (HumaLOG) 4 Unit(s) SubCutaneous three times a day before meals  insulin lispro Injectable (HumaLOG) 4 Unit(s) SubCutaneous at bedtime  lactated ringers. 1000 milliLiter(s) (75 mL/Hr) IV Continuous <Continuous>  metoprolol succinate ER 50 milliGRAM(s) Oral daily  pantoprazole    Tablet 40 milliGRAM(s) Oral before breakfast  sevelamer carbonate 2400 milliGRAM(s) Oral three times a day    MEDICATIONS  (PRN):  acetaminophen   Tablet .. 650 milliGRAM(s) Oral every 6 hours PRN Mild Pain (1 - 3), Moderate Pain (4 - 6)  dextrose 40% Gel 15 Gram(s) Oral once PRN Blood Glucose LESS THAN 70 milliGRAM(s)/deciliter  glucagon  Injectable 1 milliGRAM(s) IntraMuscular once PRN Glucose LESS THAN 70 milligrams/deciliter      Vital Signs Last 24 Hrs  T(C): 36.6 (10 Jah 2020 13:40), Max: 36.9 (10 Jah 2020 00:19)  T(F): 97.8 (10 Jah 2020 13:40), Max: 98.4 (10 Jah 2020 00:19)  HR: 75 (10 Jah 2020 13:40) (62 - 98)  BP: 113/66 (10 Jah 2020 13:40) (104/64 - 120/44)  BP(mean): --  RR: 18 (10 Jah 2020 13:40) (18 - 18)  SpO2: 100% (10 Jah 2020 13:40) (96% - 100%)    PHYSICAL EXAM:  GENERAL: NAD, well-developed  HEAD:  Atraumatic, Normocephalic  EYES: EOMI, PERRLA, conjunctiva and sclera clear  NECK: Supple, No JVD  CHEST/LUNG: Clear to auscultation bilaterally; No wheeze  HEART: Regular rate and rhythm; No murmurs, rubs, or gallops  ABDOMEN: Soft, Nontender, Nondistended; Bowel sounds present  EXTREMITIES:  L foot with clean dressing.  PSYCH: AAOx3  NEUROLOGY: non-focal  SKIN: No rashes or lesions    LABS:                        11.2   5.90  )-----------( 208      ( 10 Jah 2020 12:33 )             35.6     01-10    133<L>  |  93<L>  |  46<H>  ----------------------------<  259<H>  5.5<H>   |  23  |  7.17<H>    Ca    9.7      10 Jah 2020 09:44  Phos  5.6     01-10  Mg     2.2     01-10      PT/INR - ( 10 Jah 2020 11:36 )   PT: 10.6 sec;   INR: 0.92 ratio         PTT - ( 10 Jah 2020 11:36 )  PTT:29.4 sec            RADIOLOGY & ADDITIONAL TESTS:    Imaging Personally Reviewed:    Consultant(s) Notes Reviewed:      Care Discussed with Consultants/Other Providers:

## 2020-01-10 NOTE — CHART NOTE - NSCHARTNOTEFT_GEN_A_CORE
Vascular Surgery Note    Patient initially planned for LLE angiogram and fistulogram today - however due to OR scheduling conflict case is cancelled. Patient expressed desire to be discharged home. LLE angio and fistulogram are not emergent procedures and can be completed as an outpatient.     From Vascular Surgery perspective no contraindication to discharge home    Please have patient follow up with Dr. Elizalde - [phone]  to schedule an appointment.     CARMINA James PGY3  Vascular Surgery  [id number]

## 2020-01-10 NOTE — PROGRESS NOTE ADULT - ASSESSMENT
63y/o F well known to diabetes team from frequent admissions with SOB/CP. Pt w/h/o uncontrolled TIDM  c/b neuropathy and retinopathy as well as CAD s/p multiple stents, CHF (EF 30%), TIA, PVD s/p b/l fem-pop bypass, ESRD> HD. Recent admission with PNA. Here L foot 2nd digit pain w/ progressive swelling and positive fx of 4th L toe. S/p LE duplex (1/6/20) with severe b/l arterial vascular stenosis and occlusive disease. NPO and going for fistulogram and  L angio today. Tolerating POs with glycemic control above goal due to improved PO intake and nutritional indiscretions. BGs persistently elevated at night for the past 3 nights so will increase snack time insulin by one unit. Slow titration since pt is very sensitive to insulin. Will continue to monitor BG overnight. Pt reminded to take insulin with snack to prevent hyperglycemia. Pt verbalized understanding. No hypoglycemia.

## 2020-01-10 NOTE — PROGRESS NOTE ADULT - ASSESSMENT
Assessment:  · Assessment		  64F w/ ESRD on HD(M/T/Th/Sa), CAD s/p multiple stents/brachytherapy, mod MR, severe AS, RHF, CHF (EF 30% per last [facility]), DM1 c/b neuropathy and retinopathy, hx of TIA, severe peripheral artery disease s/p b/l fempop bypass c/b left thrombosed graft, left subclavian vein stenosis s/p stent, COPD not on O2, gout, hilar lymphadenopathy of unknown etiology, recent hospitalization for pneumonia d/c 12/9/19 presents with left toe pain and xray appearing c/w Left 2nd and 4th toe fracture incidentally found to have hyperkalemia despite HD on day of admit and therefore admitted for urgent HD w/ podiatry eval of left foot.    Hyperkalemia resolving.   - in patient with ESRD despite HD  - nephrology follow  - f/u BMP   - hold lisinopril.     Closed nondisplaced fracture of distal phalanx of lesser toe of left foot, initial encounter.   - podiatry follow  - noted 2nd toe nail avulsion as well. per podiatry c/w bacitracin  - 2nd and 4th digit appear to have fracture    Chronic obstructive pulmonary disease, unspecified COPD type.   - hx of COPD not on home Oxygen    ESRD (end stage renal disease).  - HD per nephrology.     Peripheral artery disease.   - c/w asa and plavix.   - vascular follow.   - angiogram pending  Will be done as OTP as xray machine is broken.    AVF revision by Vascular pending   Will be done as OTP as xray machine is broken.    Type 1 diabetes mellitus with hyperglycemia.   - endocrinology follow  - continue w/ home regimen for now lantus 13U and humalog 6U TID premeal w/ ISS.    Chronic systolic congestive heart failure.   - HOLD lisinopril given hyperkalemia  - continue with hydralazine 50 TID per nephrology with holding parameters    Coronary artery disease involving native coronary artery of native heart without angina pectoris.   - c/w asa, clopidogrel, statin  - cardiology follow.     No acute medical condition identified to postpone planned surgical intervention.     DC home. Follow with Nephrology/ Cardiology/ Vascular / Podiatry/ Endocrinology. QA    Pierce Viera MD pager [phone]

## 2020-01-10 NOTE — PROGRESS NOTE ADULT - SUBJECTIVE AND OBJECTIVE BOX
[facility]   [phone]  DIALYSIS NOTE  Chief Complaint: ESRD/Ongoing hemodialysis requirement. seen on hd earlier.     24 hour events/subjective:    has pain in leg         ALLERGIES & MEDICATIONS  --------------------------------------------------------------------------------  Allergies    No Known Allergies    Intolerances      Standing Inpatient Medications  ALBUTerol    90 MICROgram(s) HFA Inhaler 2 Puff(s) Inhalation every 6 hours  aspirin enteric coated 81 milliGRAM(s) Oral daily  atorvastatin 20 milliGRAM(s) Oral at bedtime  BACItracin   Ointment 1 Application(s) Topical daily  buDESOnide    Inhalation Suspension 0.5 milliGRAM(s) Inhalation two times a day  clopidogrel Tablet 75 milliGRAM(s) Oral daily  dextrose 5%. 1000 milliLiter(s) IV Continuous <Continuous>  dextrose 50% Injectable 12.5 Gram(s) IV Push once  dextrose 50% Injectable 25 Gram(s) IV Push once  dextrose 50% Injectable 25 Gram(s) IV Push once  heparin  Injectable 5000 Unit(s) SubCutaneous every 8 hours  hydrALAZINE 50 milliGRAM(s) Oral every 8 hours  insulin glargine Injectable (LANTUS) 13 Unit(s) SubCutaneous at bedtime  insulin lispro (HumaLOG) corrective regimen sliding scale   SubCutaneous <User Schedule>  insulin lispro (HumaLOG) corrective regimen sliding scale   SubCutaneous three times a day before meals  insulin lispro (HumaLOG) corrective regimen sliding scale   SubCutaneous at bedtime  insulin lispro Injectable (HumaLOG) 4 Unit(s) SubCutaneous three times a day before meals  insulin lispro Injectable (HumaLOG) 4 Unit(s) SubCutaneous at bedtime  lactated ringers. 1000 milliLiter(s) IV Continuous <Continuous>  metoprolol succinate ER 50 milliGRAM(s) Oral daily  pantoprazole    Tablet 40 milliGRAM(s) Oral before breakfast  sevelamer carbonate 2400 milliGRAM(s) Oral three times a day    PRN Inpatient Medications  acetaminophen   Tablet .. 650 milliGRAM(s) Oral every 6 hours PRN  dextrose 40% Gel 15 Gram(s) Oral once PRN  glucagon  Injectable 1 milliGRAM(s) IntraMuscular once PRN      REVIEW OF SYSTEMS  --------------------------------------------------------------------------------  no itching or rash  no fever or chill  no cp or palp   no sob or cough   no N/V/D/ no abd pain   ext no edema        VITALS/PHYSICAL EXAM  --------------------------------------------------------------------------------  T(C): 36.6 (01-10-20 @ 13:40), Max: 36.9 (01-10-20 @ 00:19)  HR: 75 (01-10-20 @ 13:40) (62 - 90)  BP: 113/66 (01-10-20 @ 13:40) (106/66 - 120/44)  RR: 18 (01-10-20 @ 13:40) (18 - 18)  SpO2: 100% (01-10-20 @ 13:40) (96% - 100%)  Wt(kg): --        01-09-20 @ 07:01  -  01-10-20 @ 07:00  --------------------------------------------------------  IN: 790 mL / OUT: 0 mL / NET: 790 mL    01-10-20 @ 07:01  -  01-10-20 @ 22:04  --------------------------------------------------------  IN: 0 mL / OUT: 1000 mL / NET: -1000 mL      Physical Exam:  		  Gen:  comfortable appearing    	no jvd ,  	Pulm: decrease bs  no rales or ronchi or wheezing  	CV: RRR, S1S2; no rub  	Abd: +BS, soft, nontender/nondistended  	: No suprapubic tenderness  	UE: Warm, no cyanosis  no clubbing,  no edema  	LE: Warm, no cyanosis  no clubbing, LLE no edema  	Neuro: alert and oriented. speech coherent   	Vascular access: LUE + AVF + thrill and bruit 	  	    LABS/STUDIES  --------------------------------------------------------------------------------              11.2   5.90  >-----------<  208      [01-10-20 @ 12:33]              35.6     133  |  93  |  46  ----------------------------<  259      [01-10-20 @ 09:44]  5.5   |  23  |  7.17        Ca     9.7     [01-10-20 @ 09:44]      Mg     2.2     [01-10-20 @ 09:44]      Phos  5.6     [01-10-20 @ 09:44]      PT/INR: PT 10.6 , INR 0.92       [01-10-20 @ 11:36]  PTT: 29.4       [01-10-20 @ 11:36]              imp/suggest: ESRD      Hemodialysis Prescription:  	Access:  	Dialyzer: revaclear   	Blood Flow (mL/Min): 400  	Dialysate Flow (mL/Min): 600  	Target UF (Liters):  	Treatment Time:  	Potassium:   	Calcium: 2.5  	  YOLANDA    Vitamin D     continue with hd   see hd flow sheet

## 2020-01-16 ENCOUNTER — INPATIENT (INPATIENT)
Facility: HOSPITAL | Age: 65
LOS: 1 days | Discharge: HOME | DRG: 308 | End: 2020-01-18
Attending: INTERNAL MEDICINE | Admitting: INTERNAL MEDICINE
Payer: MEDICARE

## 2020-01-16 VITALS
RESPIRATION RATE: 16 BRPM | HEIGHT: 64 IN | TEMPERATURE: 98 F | WEIGHT: 117.95 LBS | OXYGEN SATURATION: 98 % | DIASTOLIC BLOOD PRESSURE: 61 MMHG | HEART RATE: 77 BPM | SYSTOLIC BLOOD PRESSURE: 112 MMHG

## 2020-01-16 DIAGNOSIS — Z98.89 OTHER SPECIFIED POSTPROCEDURAL STATES: Chronic | ICD-10-CM

## 2020-01-16 LAB
ALBUMIN SERPL ELPH-MCNC: 4.5 G/DL — SIGNIFICANT CHANGE UP (ref 3.3–5)
ALP SERPL-CCNC: 174 U/L — HIGH (ref 40–120)
ALT FLD-CCNC: 10 U/L — SIGNIFICANT CHANGE UP (ref 10–45)
ANION GAP SERPL CALC-SCNC: 22 MMOL/L — HIGH (ref 5–17)
APTT BLD: 29.6 SEC — SIGNIFICANT CHANGE UP (ref 27.5–36.3)
AST SERPL-CCNC: 16 U/L — SIGNIFICANT CHANGE UP (ref 10–40)
BASOPHILS # BLD AUTO: 0.05 K/UL — SIGNIFICANT CHANGE UP (ref 0–0.2)
BASOPHILS NFR BLD AUTO: 0.7 % — SIGNIFICANT CHANGE UP (ref 0–2)
BILIRUB SERPL-MCNC: 0.4 MG/DL — SIGNIFICANT CHANGE UP (ref 0.2–1.2)
BUN SERPL-MCNC: 22 MG/DL — SIGNIFICANT CHANGE UP (ref 7–23)
CALCIUM SERPL-MCNC: 9.7 MG/DL — SIGNIFICANT CHANGE UP (ref 8.4–10.5)
CHLORIDE SERPL-SCNC: 88 MMOL/L — LOW (ref 96–108)
CK MB BLD-MCNC: 5.3 % — HIGH (ref 0–3.5)
CK MB CFR SERPL CALC: 7.9 NG/ML — HIGH (ref 0–3.8)
CK SERPL-CCNC: 149 U/L — SIGNIFICANT CHANGE UP (ref 25–170)
CO2 SERPL-SCNC: 26 MMOL/L — SIGNIFICANT CHANGE UP (ref 22–31)
CREAT SERPL-MCNC: 3.71 MG/DL — HIGH (ref 0.5–1.3)
EOSINOPHIL # BLD AUTO: 0.1 K/UL — SIGNIFICANT CHANGE UP (ref 0–0.5)
EOSINOPHIL NFR BLD AUTO: 1.3 % — SIGNIFICANT CHANGE UP (ref 0–6)
GLUCOSE SERPL-MCNC: 417 MG/DL — HIGH (ref 70–99)
HCT VFR BLD CALC: 40.3 % — SIGNIFICANT CHANGE UP (ref 34.5–45)
HGB BLD-MCNC: 12.7 G/DL — SIGNIFICANT CHANGE UP (ref 11.5–15.5)
IMM GRANULOCYTES NFR BLD AUTO: 0.3 % — SIGNIFICANT CHANGE UP (ref 0–1.5)
INR BLD: 0.96 RATIO — SIGNIFICANT CHANGE UP (ref 0.88–1.16)
LYMPHOCYTES # BLD AUTO: 0.9 K/UL — LOW (ref 1–3.3)
LYMPHOCYTES # BLD AUTO: 12 % — LOW (ref 13–44)
MCHC RBC-ENTMCNC: 31.5 GM/DL — LOW (ref 32–36)
MCHC RBC-ENTMCNC: 32.6 PG — SIGNIFICANT CHANGE UP (ref 27–34)
MCV RBC AUTO: 103.3 FL — HIGH (ref 80–100)
MONOCYTES # BLD AUTO: 0.62 K/UL — SIGNIFICANT CHANGE UP (ref 0–0.9)
MONOCYTES NFR BLD AUTO: 8.3 % — SIGNIFICANT CHANGE UP (ref 2–14)
NEUTROPHILS # BLD AUTO: 5.78 K/UL — SIGNIFICANT CHANGE UP (ref 1.8–7.4)
NEUTROPHILS NFR BLD AUTO: 77.4 % — HIGH (ref 43–77)
NRBC # BLD: 0 /100 WBCS — SIGNIFICANT CHANGE UP (ref 0–0)
PLATELET # BLD AUTO: 253 K/UL — SIGNIFICANT CHANGE UP (ref 150–400)
POTASSIUM SERPL-MCNC: 5.5 MMOL/L — HIGH (ref 3.5–5.3)
POTASSIUM SERPL-SCNC: 5.5 MMOL/L — HIGH (ref 3.5–5.3)
PROT SERPL-MCNC: 7.7 G/DL — SIGNIFICANT CHANGE UP (ref 6–8.3)
PROTHROM AB SERPL-ACNC: 11 SEC — SIGNIFICANT CHANGE UP (ref 10–12.9)
RBC # BLD: 3.9 M/UL — SIGNIFICANT CHANGE UP (ref 3.8–5.2)
RBC # FLD: 14.2 % — SIGNIFICANT CHANGE UP (ref 10.3–14.5)
SODIUM SERPL-SCNC: 136 MMOL/L — SIGNIFICANT CHANGE UP (ref 135–145)
TROPONIN T, HIGH SENSITIVITY RESULT: 365 NG/L — HIGH (ref 0–51)
WBC # BLD: 7.47 K/UL — SIGNIFICANT CHANGE UP (ref 3.8–10.5)
WBC # FLD AUTO: 7.47 K/UL — SIGNIFICANT CHANGE UP (ref 3.8–10.5)

## 2020-01-16 PROCEDURE — 71046 X-RAY EXAM CHEST 2 VIEWS: CPT | Mod: 26

## 2020-01-16 PROCEDURE — 93010 ELECTROCARDIOGRAM REPORT: CPT

## 2020-01-16 PROCEDURE — 99285 EMERGENCY DEPT VISIT HI MDM: CPT | Mod: 25,GC

## 2020-01-16 RX ORDER — SODIUM CHLORIDE 9 MG/ML
250 INJECTION INTRAMUSCULAR; INTRAVENOUS; SUBCUTANEOUS ONCE
Refills: 0 | Status: COMPLETED | OUTPATIENT
Start: 2020-01-16 | End: 2020-01-16

## 2020-01-16 RX ADMIN — SODIUM CHLORIDE 250 MILLILITER(S): 9 INJECTION INTRAMUSCULAR; INTRAVENOUS; SUBCUTANEOUS at 20:26

## 2020-01-16 NOTE — ED PROVIDER NOTE - PSH
Telephone Encounter by Shelby Bacon RN at 03/21/18 02:45 PM     Author:  Shelby Bacon RN Service:  (none) Author Type:  Registered Nurse     Filed:  03/21/18 02:46 PM Encounter Date:  3/20/2018 Status:  Signed     :  Shelby Bacon RN (Registered Nurse)            I spoke with the patient and advised him to hold his next dose of Cosentyx and that he needs to contact both Dr Clifton and Dr Arias for ok prior to resuming medication.[SM1.1M]  Patient verbalized understanding.[SM1.1T]  Electronically Signed by:    Shelby Bacon RN , 3/21/2018[SM1.2T]        Revision History        User Key Date/Time User Provider Type Action    > SM1.2 03/21/18 02:46 PM Shelby Bacon RN Registered Nurse Sign     SM1.1 03/21/18 02:45 PM Shelby Bacon RN Registered Nurse     M - Manual, T - Template             AV (arteriovenous fistula)  Left AV graft; revision with stent placement 2-3 years ago  History of cardiac catheterization  1/2015 - no intervention  S/P cholecystectomy    S/P femoral-popliteal bypass surgery  L and R in 2013 with graft; 5/2015 CFA angioplasty left and ileofemoral endarterectomywith vein patch angioplasty of left fem-pop bypass graft  Multiple vascular surgery both leg, left fempop bypass revision 11/2015  Status post device closure of ASD  "brenna"

## 2020-01-16 NOTE — ED PROVIDER NOTE - PHYSICAL EXAMINATION
PGY2/MD Doretha.   VITALS: reviewed  GEN: No apparent distress, A & O x 4  HEAD/EYES: NC/AT, PERRL, EOMI, anicteric sclerae, no conjunctival pallor  ENT: mucus membranes moist, oropharynx WNL, trachea midline, no JVD, neck is supple  RESP: lungs CTA with equal breath sounds bilaterally, chest wall nontender and atraumatic  CV: heart with reg rhythm S1, S2, no murmur; distal pulses intact and symmetric bilaterally  ABDOMEN: normoactive bowel sounds, soft, nondistended, nontender, no palpable masses  : no CVAT  MSK: extremities atraumatic and nontender, no edema, no asymmetry.   SKIN: warm, dry, no rash, no bruising, no cyanosis. color appropriate for ethnicity  NEURO: alert, mentating appropriately, no facial asymmetry.  PSYCH: Affect appropriate

## 2020-01-16 NOTE — ED PROVIDER NOTE - OBJECTIVE STATEMENT
LISA/MD Doretha. 65 yo F with CAD on stents, ESRD on HD (TThSa), today sent from Dr. Biswas's office for tachycardia. Pt was found tachycardia after HD, no palpitation or chest pain. Pt was seen by Dr. Biswas and was sent for further eval. Initial HR was 100 but now 80 in the room. Pt does not complain any symptoms.

## 2020-01-16 NOTE — ED PROVIDER NOTE - ATTENDING CONTRIBUTION TO CARE
pt is a 65 y/o female with pt is a 63 y/o female  w/ ESRD on HD(M/T/Th/Sa), CAD s/p multiple stents y, mod MR, severe AS, RHF, CHF (EF 30% per last [facility]), DM1 with HD today presents with tachycardia from HD sent to Dr jeffers who sent to ER with no sts except diarrhea today x 1-2, threw up once this am but tolerating po, abd soft, nt.  possible mild dehydration but vss in er, no cp or sob, cardiac work up ordered due to her history. just left hospital a week ago.

## 2020-01-16 NOTE — ED ADULT NURSE NOTE - OBJECTIVE STATEMENT
64 year old female presents to the ED from outpatient cardiologist for tachycardia (107 outpatient), HR is WNL on arrival to ED. PMH of ESRD on dialysis T,TH,S - fistula and do not use extremity bracelet on L arm - had full dialysis today. Pt. had two loose stools afterwards and was sent to cardiologist, where she was tachy, On assessment, patient denies currently having chest pain, palpitations, loose stools, nausea, vomiting, blood in stool, dysuria, hematuria. VSS. HR is 80 on assessment. Pt. states "send me home if it's 80" "I feel fine." Patient undressed and placed into gown, call bell in hand and side rails up with bed in lowest position for safety. blanket provided. Comfort and safety provided.

## 2020-01-16 NOTE — ED PROVIDER NOTE - PROGRESS NOTE DETAILS
PGY2/MD Doretha. HR is 80, no symptoms at my encounter. Calling Dr. Biswas, but no call back yet. Pending second trop.

## 2020-01-16 NOTE — ED ADULT NURSE NOTE - CHPI ED NUR SYMPTOMS NEG
no shortness of breath/no chest pain/no back pain/no chills/no fever/no nausea/no syncope/no diaphoresis/no congestion/no dizziness/no vomiting

## 2020-01-17 DIAGNOSIS — R79.89 OTHER SPECIFIED ABNORMAL FINDINGS OF BLOOD CHEMISTRY: ICD-10-CM

## 2020-01-17 DIAGNOSIS — R09.89 OTHER SPECIFIED SYMPTOMS AND SIGNS INVOLVING THE CIRCULATORY AND RESPIRATORY SYSTEMS: ICD-10-CM

## 2020-01-17 DIAGNOSIS — E10.49 TYPE 1 DIABETES MELLITUS WITH OTHER DIABETIC NEUROLOGICAL COMPLICATION: ICD-10-CM

## 2020-01-17 DIAGNOSIS — E87.5 HYPERKALEMIA: ICD-10-CM

## 2020-01-17 DIAGNOSIS — N18.6 END STAGE RENAL DISEASE: ICD-10-CM

## 2020-01-17 DIAGNOSIS — I73.9 PERIPHERAL VASCULAR DISEASE, UNSPECIFIED: ICD-10-CM

## 2020-01-17 DIAGNOSIS — Z29.9 ENCOUNTER FOR PROPHYLACTIC MEASURES, UNSPECIFIED: ICD-10-CM

## 2020-01-17 DIAGNOSIS — R19.7 DIARRHEA, UNSPECIFIED: ICD-10-CM

## 2020-01-17 DIAGNOSIS — I10 ESSENTIAL (PRIMARY) HYPERTENSION: ICD-10-CM

## 2020-01-17 DIAGNOSIS — R00.0 TACHYCARDIA, UNSPECIFIED: ICD-10-CM

## 2020-01-17 DIAGNOSIS — I50.22 CHRONIC SYSTOLIC (CONGESTIVE) HEART FAILURE: ICD-10-CM

## 2020-01-17 LAB
ALBUMIN SERPL ELPH-MCNC: 4.1 G/DL — SIGNIFICANT CHANGE UP (ref 3.3–5)
ALP SERPL-CCNC: 154 U/L — HIGH (ref 40–120)
ALT FLD-CCNC: 11 U/L — SIGNIFICANT CHANGE UP (ref 10–45)
ANION GAP SERPL CALC-SCNC: 20 MMOL/L — HIGH (ref 5–17)
AST SERPL-CCNC: 15 U/L — SIGNIFICANT CHANGE UP (ref 10–40)
BASOPHILS # BLD AUTO: 0.05 K/UL — SIGNIFICANT CHANGE UP (ref 0–0.2)
BASOPHILS NFR BLD AUTO: 0.9 % — SIGNIFICANT CHANGE UP (ref 0–2)
BILIRUB DIRECT SERPL-MCNC: 0.1 MG/DL — SIGNIFICANT CHANGE UP (ref 0–0.2)
BILIRUB INDIRECT FLD-MCNC: 0.2 MG/DL — SIGNIFICANT CHANGE UP (ref 0.2–1)
BILIRUB SERPL-MCNC: 0.3 MG/DL — SIGNIFICANT CHANGE UP (ref 0.2–1.2)
BUN SERPL-MCNC: 28 MG/DL — HIGH (ref 7–23)
CALCIUM SERPL-MCNC: 9.6 MG/DL — SIGNIFICANT CHANGE UP (ref 8.4–10.5)
CHLORIDE SERPL-SCNC: 92 MMOL/L — LOW (ref 96–108)
CO2 SERPL-SCNC: 27 MMOL/L — SIGNIFICANT CHANGE UP (ref 22–31)
CREAT SERPL-MCNC: 4.47 MG/DL — HIGH (ref 0.5–1.3)
EOSINOPHIL # BLD AUTO: 0.18 K/UL — SIGNIFICANT CHANGE UP (ref 0–0.5)
EOSINOPHIL NFR BLD AUTO: 3.4 % — SIGNIFICANT CHANGE UP (ref 0–6)
GLUCOSE BLDC GLUCOMTR-MCNC: 146 MG/DL — HIGH (ref 70–99)
GLUCOSE BLDC GLUCOMTR-MCNC: 183 MG/DL — HIGH (ref 70–99)
GLUCOSE BLDC GLUCOMTR-MCNC: 239 MG/DL — HIGH (ref 70–99)
GLUCOSE BLDC GLUCOMTR-MCNC: 318 MG/DL — HIGH (ref 70–99)
GLUCOSE BLDC GLUCOMTR-MCNC: 358 MG/DL — HIGH (ref 70–99)
GLUCOSE BLDC GLUCOMTR-MCNC: 370 MG/DL — HIGH (ref 70–99)
GLUCOSE BLDC GLUCOMTR-MCNC: 397 MG/DL — HIGH (ref 70–99)
GLUCOSE BLDC GLUCOMTR-MCNC: 399 MG/DL — HIGH (ref 70–99)
GLUCOSE SERPL-MCNC: 303 MG/DL — HIGH (ref 70–99)
HCT VFR BLD CALC: 35.2 % — SIGNIFICANT CHANGE UP (ref 34.5–45)
HGB BLD-MCNC: 10.9 G/DL — LOW (ref 11.5–15.5)
IMM GRANULOCYTES NFR BLD AUTO: 0.2 % — SIGNIFICANT CHANGE UP (ref 0–1.5)
LYMPHOCYTES # BLD AUTO: 1.04 K/UL — SIGNIFICANT CHANGE UP (ref 1–3.3)
LYMPHOCYTES # BLD AUTO: 19.5 % — SIGNIFICANT CHANGE UP (ref 13–44)
MAGNESIUM SERPL-MCNC: 2.2 MG/DL — SIGNIFICANT CHANGE UP (ref 1.6–2.6)
MCHC RBC-ENTMCNC: 31 GM/DL — LOW (ref 32–36)
MCHC RBC-ENTMCNC: 32.4 PG — SIGNIFICANT CHANGE UP (ref 27–34)
MCV RBC AUTO: 104.8 FL — HIGH (ref 80–100)
MONOCYTES # BLD AUTO: 0.67 K/UL — SIGNIFICANT CHANGE UP (ref 0–0.9)
MONOCYTES NFR BLD AUTO: 12.6 % — SIGNIFICANT CHANGE UP (ref 2–14)
NEUTROPHILS # BLD AUTO: 3.38 K/UL — SIGNIFICANT CHANGE UP (ref 1.8–7.4)
NEUTROPHILS NFR BLD AUTO: 63.4 % — SIGNIFICANT CHANGE UP (ref 43–77)
PHOSPHATE SERPL-MCNC: 5.1 MG/DL — HIGH (ref 2.5–4.5)
PLATELET # BLD AUTO: 247 K/UL — SIGNIFICANT CHANGE UP (ref 150–400)
POTASSIUM SERPL-MCNC: 5.1 MMOL/L — SIGNIFICANT CHANGE UP (ref 3.5–5.3)
POTASSIUM SERPL-SCNC: 5.1 MMOL/L — SIGNIFICANT CHANGE UP (ref 3.5–5.3)
PROT SERPL-MCNC: 6.9 G/DL — SIGNIFICANT CHANGE UP (ref 6–8.3)
RBC # BLD: 3.36 M/UL — LOW (ref 3.8–5.2)
RBC # FLD: 14 % — SIGNIFICANT CHANGE UP (ref 10.3–14.5)
SODIUM SERPL-SCNC: 139 MMOL/L — SIGNIFICANT CHANGE UP (ref 135–145)
TROPONIN T, HIGH SENSITIVITY RESULT: 293 NG/L — HIGH (ref 0–51)
TROPONIN T, HIGH SENSITIVITY RESULT: 384 NG/L — HIGH (ref 0–51)
WBC # BLD: 5.33 K/UL — SIGNIFICANT CHANGE UP (ref 3.8–10.5)
WBC # FLD AUTO: 5.33 K/UL — SIGNIFICANT CHANGE UP (ref 3.8–10.5)

## 2020-01-17 PROCEDURE — 99223 1ST HOSP IP/OBS HIGH 75: CPT

## 2020-01-17 RX ORDER — INSULIN LISPRO 100/ML
VIAL (ML) SUBCUTANEOUS
Refills: 0 | Status: DISCONTINUED | OUTPATIENT
Start: 2020-01-17 | End: 2020-01-18

## 2020-01-17 RX ORDER — INSULIN LISPRO 100/ML
4 VIAL (ML) SUBCUTANEOUS
Refills: 0 | Status: DISCONTINUED | OUTPATIENT
Start: 2020-01-17 | End: 2020-01-18

## 2020-01-17 RX ORDER — BUDESONIDE, MICRONIZED 100 %
0.5 POWDER (GRAM) MISCELLANEOUS AT BEDTIME
Refills: 0 | Status: DISCONTINUED | OUTPATIENT
Start: 2020-01-17 | End: 2020-01-18

## 2020-01-17 RX ORDER — DEXTROSE 50 % IN WATER 50 %
25 SYRINGE (ML) INTRAVENOUS ONCE
Refills: 0 | Status: DISCONTINUED | OUTPATIENT
Start: 2020-01-17 | End: 2020-01-18

## 2020-01-17 RX ORDER — ASPIRIN/CALCIUM CARB/MAGNESIUM 324 MG
81 TABLET ORAL ONCE
Refills: 0 | Status: COMPLETED | OUTPATIENT
Start: 2020-01-17 | End: 2020-01-17

## 2020-01-17 RX ORDER — CLOPIDOGREL BISULFATE 75 MG/1
75 TABLET, FILM COATED ORAL AT BEDTIME
Refills: 0 | Status: DISCONTINUED | OUTPATIENT
Start: 2020-01-17 | End: 2020-01-18

## 2020-01-17 RX ORDER — GLUCAGON INJECTION, SOLUTION 0.5 MG/.1ML
1 INJECTION, SOLUTION SUBCUTANEOUS ONCE
Refills: 0 | Status: DISCONTINUED | OUTPATIENT
Start: 2020-01-17 | End: 2020-01-18

## 2020-01-17 RX ORDER — SODIUM CHLORIDE 9 MG/ML
1000 INJECTION, SOLUTION INTRAVENOUS
Refills: 0 | Status: DISCONTINUED | OUTPATIENT
Start: 2020-01-17 | End: 2020-01-18

## 2020-01-17 RX ORDER — ATORVASTATIN CALCIUM 80 MG/1
20 TABLET, FILM COATED ORAL AT BEDTIME
Refills: 0 | Status: DISCONTINUED | OUTPATIENT
Start: 2020-01-17 | End: 2020-01-18

## 2020-01-17 RX ORDER — INSULIN LISPRO 100/ML
VIAL (ML) SUBCUTANEOUS AT BEDTIME
Refills: 0 | Status: DISCONTINUED | OUTPATIENT
Start: 2020-01-17 | End: 2020-01-18

## 2020-01-17 RX ORDER — SODIUM CHLORIDE 9 MG/ML
125 INJECTION INTRAMUSCULAR; INTRAVENOUS; SUBCUTANEOUS
Refills: 0 | Status: DISCONTINUED | OUTPATIENT
Start: 2020-01-17 | End: 2020-01-18

## 2020-01-17 RX ORDER — ACETAMINOPHEN 500 MG
1000 TABLET ORAL ONCE
Refills: 0 | Status: COMPLETED | OUTPATIENT
Start: 2020-01-17 | End: 2020-01-17

## 2020-01-17 RX ORDER — PANTOPRAZOLE SODIUM 20 MG/1
40 TABLET, DELAYED RELEASE ORAL
Refills: 0 | Status: DISCONTINUED | OUTPATIENT
Start: 2020-01-17 | End: 2020-01-18

## 2020-01-17 RX ORDER — DEXTROSE 50 % IN WATER 50 %
15 SYRINGE (ML) INTRAVENOUS ONCE
Refills: 0 | Status: DISCONTINUED | OUTPATIENT
Start: 2020-01-17 | End: 2020-01-18

## 2020-01-17 RX ORDER — INSULIN GLARGINE 100 [IU]/ML
13 INJECTION, SOLUTION SUBCUTANEOUS AT BEDTIME
Refills: 0 | Status: DISCONTINUED | OUTPATIENT
Start: 2020-01-17 | End: 2020-01-18

## 2020-01-17 RX ORDER — DEXTROSE 50 % IN WATER 50 %
12.5 SYRINGE (ML) INTRAVENOUS ONCE
Refills: 0 | Status: DISCONTINUED | OUTPATIENT
Start: 2020-01-17 | End: 2020-01-18

## 2020-01-17 RX ORDER — ASPIRIN/CALCIUM CARB/MAGNESIUM 324 MG
81 TABLET ORAL AT BEDTIME
Refills: 0 | Status: DISCONTINUED | OUTPATIENT
Start: 2020-01-17 | End: 2020-01-18

## 2020-01-17 RX ADMIN — Medication 81 MILLIGRAM(S): at 23:13

## 2020-01-17 RX ADMIN — Medication 4 UNIT(S): at 13:36

## 2020-01-17 RX ADMIN — SODIUM CHLORIDE 125 MILLILITER(S): 9 INJECTION INTRAMUSCULAR; INTRAVENOUS; SUBCUTANEOUS at 01:40

## 2020-01-17 RX ADMIN — INSULIN GLARGINE 13 UNIT(S): 100 INJECTION, SOLUTION SUBCUTANEOUS at 23:13

## 2020-01-17 RX ADMIN — Medication 3: at 23:23

## 2020-01-17 RX ADMIN — Medication 1: at 09:44

## 2020-01-17 RX ADMIN — ATORVASTATIN CALCIUM 20 MILLIGRAM(S): 80 TABLET, FILM COATED ORAL at 23:13

## 2020-01-17 RX ADMIN — Medication 4 UNIT(S): at 09:44

## 2020-01-17 RX ADMIN — CLOPIDOGREL BISULFATE 75 MILLIGRAM(S): 75 TABLET, FILM COATED ORAL at 02:53

## 2020-01-17 RX ADMIN — Medication 3: at 02:07

## 2020-01-17 RX ADMIN — CLOPIDOGREL BISULFATE 75 MILLIGRAM(S): 75 TABLET, FILM COATED ORAL at 23:13

## 2020-01-17 RX ADMIN — Medication 81 MILLIGRAM(S): at 03:17

## 2020-01-17 RX ADMIN — Medication 400 MILLIGRAM(S): at 21:41

## 2020-01-17 RX ADMIN — INSULIN GLARGINE 13 UNIT(S): 100 INJECTION, SOLUTION SUBCUTANEOUS at 02:07

## 2020-01-17 RX ADMIN — Medication 2: at 13:35

## 2020-01-17 RX ADMIN — PANTOPRAZOLE SODIUM 40 MILLIGRAM(S): 20 TABLET, DELAYED RELEASE ORAL at 05:54

## 2020-01-17 RX ADMIN — Medication 4 UNIT(S): at 18:44

## 2020-01-17 RX ADMIN — Medication 0.5 MILLIGRAM(S): at 23:13

## 2020-01-17 RX ADMIN — Medication 1000 MILLIGRAM(S): at 22:11

## 2020-01-17 NOTE — H&P ADULT - PROBLEM SELECTOR PLAN 8
-Trend vital signs, given borderline BP on most recent Vital signs check, will hold off BP meds for now  -Holding home Toprol  -Holding home hydralazine. Different dose between what patient takes at home and most recent admission. Pt takes hydralazine 50mg AM and 100mg afternoon and bedtime at home.   -If BP improves would restart Toprol first

## 2020-01-17 NOTE — CONSULT NOTE ADULT - ASSESSMENT
69F w/ CAD s/p multiple NICOLAS, ESRD on HD M, T, Th, Sat, CHF EF 30%, DM1 on insulin, TIA, COPD, severe peripheral artery disease s/p b/l fempop bypass c/b left thrombosed graft, left subclavian vein stenosis s/p stent, severe AS, hilar lymphadenopathy   now admitted with tachycardia post hd     1- esrd  2- htn     had ivf in er. hr improved   hd consent obtained witnessed and placed in chart  bp improved after ivf   hd in am

## 2020-01-17 NOTE — H&P ADULT - NSHPPHYSICALEXAM_GEN_ALL_CORE
Vital Signs Last 24 Hrs  T(C): 36.7 (01-17-20 @ 01:15), Max: 36.8 (01-16-20 @ 19:18)  T(F): 98.1 (01-17-20 @ 01:15), Max: 98.2 (01-16-20 @ 19:18)  HR: 80 (01-17-20 @ 01:15) (77 - 80)  BP: 98/58 (01-17-20 @ 01:15) (98/58 - 125/72)  BP(mean): --  RR: 18 (01-17-20 @ 01:15) (16 - 18)  SpO2: 96% (01-17-20 @ 01:15) (96% - 98%)

## 2020-01-17 NOTE — H&P ADULT - PROBLEM SELECTOR PLAN 5
On HD M, Tu, Th and Sat? Follows with [facility]. States she no longer makes urine  -Nephrology consult in AM for routine dialysis  -Unclear if pt takes sevelamer

## 2020-01-17 NOTE — H&P ADULT - PROBLEM SELECTOR PLAN 3
K 5.5 on lab work. Note ESRD status  -Trend BMP in AM  -See below regarding nephrology consult and dialysis

## 2020-01-17 NOTE — H&P ADULT - NSHPLABSRESULTS_GEN_ALL_CORE
I have reviewed the labs, imaging and ekg. EKG with infero lateral TWI. TWI in lateral leads seem new/more prominent compared to EKG from recent admission

## 2020-01-17 NOTE — H&P ADULT - PROBLEM SELECTOR PLAN 2
Troponin significantly elevated. Pt denies any cardiac or pulmonary symptoms. Could be falsely elevated in setting of dialysis but given severe coronary diease also concern for NSTEMI. More likely demand ischemia possibly in setting of volume depletion s/p dialysis and tachycardia. Note TWI in lateral leads which seem more prominent than during recent admission.  -Repeat troponin in AM  -Repeat EKG in AM  -Cardiology consult (Koss) in AM  -Telemetry

## 2020-01-17 NOTE — H&P ADULT - PROBLEM SELECTOR PLAN 1
Seems to have resolved without any intervention. Unclear if there was other concerning aspect of presentation which prompted recommendation to come to ER. Pt relatively asymptomatic. Reviewed meds given in ER with RN team, most likely  listed as given was not given. Given borderline BP with cautiously give fluids. Could be related to volume depletion s/p dialysis?  -Will given  and monitor vital signs for improvement  -Will place on telemetry for now  -See below regarding cardiology consult

## 2020-01-17 NOTE — H&P ADULT - PROBLEM SELECTOR PLAN 7
On Basaglar 13U QHS and 3-4U pre-meal insulin at home. Appreciate most recent endocrine recommendations  -Cont. Lantus 13U bedtime  -Cont. low dose sliding scale and 4U TIDAC insulin  -Endocrine consult in AM

## 2020-01-17 NOTE — H&P ADULT - ASSESSMENT
69F w/ CAD s/p multiple NICOLAS, ESRD on HD M, T, Th, Sat, CHF EF 30%, DM1 on insulin, TIA, COPD, severe peripheral artery disease s/p b/l fempop bypass c/b left thrombosed graft, left subclavian vein stenosis s/p stent, severe AS, hilar lymphadenopathy of unknown etiology, recent hospitalization for pneumonia d/c 12/9/19 p/w tachycardia

## 2020-01-17 NOTE — H&P ADULT - PROBLEM SELECTOR PLAN 9
DVT PPx  -SCDs severe peripheral artery disease s/p b/l fempop bypass c/b left thrombosed graft  -Also recent L toe fracture.  -Was evaluated by podiatry and vascular during recent admission

## 2020-01-17 NOTE — CONSULT NOTE ADULT - SUBJECTIVE AND OBJECTIVE BOX
CHIEF COMPLAINT: tachycardia    HISTORY OF PRESENT ILLNESS:  69F w/ CAD s/p multiple NICOLAS, ESRD on HD M, T, Th, Sat, CHF EF 30%, DM1 on insulin, TIA, COPD, severe peripheral artery disease s/p b/l fempop bypass c/b left thrombosed graft, left subclavian vein stenosis s/p stent, severe AS, hilar lymphadenopathy of unknown etiology, recent hospitalization for pneumonia d/c 12/9/19 p/w tachycardia. Pt states she went for her usual dialysis session today and dialysis nurse became worried regarding her HR of 104. Pt states she completed her dialysis session without any subjective complaints and then was sent to see her PMD. In PMD office pt states her HR was around 107 and she was sent to ER for further evaluation. Pt states only symptomatic complaints is watery BM this AM but not subsequent episodes. Denies chest pain, SOB, palpitations, dizziness, LE edema, fever, chills.     In ER: Although  is stated as being given, reviewed with RN, bag still hanging by bedside not yet given.       Allergies  No Known Allergies    Intolerances    	    MEDICATIONS:  aspirin enteric coated 81 milliGRAM(s) Oral at bedtime  clopidogrel Tablet 75 milliGRAM(s) Oral at bedtime  buDESOnide    Inhalation Suspension 0.5 milliGRAM(s) Inhalation at bedtime  pantoprazole    Tablet 40 milliGRAM(s) Oral before breakfast  atorvastatin 20 milliGRAM(s) Oral at bedtime  dextrose 40% Gel 15 Gram(s) Oral once PRN  dextrose 50% Injectable 12.5 Gram(s) IV Push once  dextrose 50% Injectable 25 Gram(s) IV Push once  dextrose 50% Injectable 25 Gram(s) IV Push once  glucagon  Injectable 1 milliGRAM(s) IntraMuscular once PRN  insulin glargine Injectable (LANTUS) 13 Unit(s) SubCutaneous at bedtime  insulin lispro (HumaLOG) corrective regimen sliding scale   SubCutaneous three times a day before meals  insulin lispro (HumaLOG) corrective regimen sliding scale   SubCutaneous at bedtime  insulin lispro Injectable (HumaLOG) 4 Unit(s) SubCutaneous three times a day before meals    dextrose 5%. 1000 milliLiter(s) IV Continuous <Continuous>  sodium chloride 0.9%. 125 milliLiter(s) IV Continuous <Continuous>      PAST MEDICAL & SURGICAL HISTORY:  COPD (chronic obstructive pulmonary disease)  Localized enlarged lymph nodes  CHF (congestive heart failure): EF 40-45%  Subclavian vein stenosis, left: s/p stent  DKA, type 1: 1/2015  ACS (acute coronary syndrome): 1/2015 - cath revealed 100% ostial stenosis not amenable to PCI - medical management  TIA (transient ischemic attack): x 2 - 8-9 years ago prior to ASD/VSD repair  CAD (coronary artery disease): s/p stents  Gout: past  CVA (cerebral infarction): with no residual, 8 yrs ago, prior to heart surgery - ST memory loss  Peripheral vascular disease: occluded left fem-pop bypass 5/2015  Diabetes mellitus type 1: Insulin Dependent -  ESRD (end stage renal disease): dialysis  M, tue, thursday, saturday  Hyperlipidemia  Status post device closure of ASD: &quot;clamshell&quot;  History of cardiac catheterization: 1/2015 - no intervention  S/P femoral-popliteal bypass surgery: L and R in 2013 with graft; 5/2015 CFA angioplasty left and ileofemoral endarterectomywith vein patch angioplasty of left fem-pop bypass graft  Multiple vascular surgery both leg, left fempop bypass revision 11/2015  AV (arteriovenous fistula): Left AV graft; revision with stent placement 2-3 years ago  S/P cholecystectomy      FAMILY HISTORY:  Family history of smoking  Family history of hypertension  Family history of cancer (Sibling)      SOCIAL HISTORY:    former smoker. independent in adl        REVIEW OF SYSTEMS:  See HPI, otherwise complete 10 point review of systems negative    [ ] All others negative	      PHYSICAL EXAM:  T(C): 36.6 (01-17-20 @ 08:01), Max: 36.8 (01-16-20 @ 19:18)  HR: 69 (01-17-20 @ 08:01) (69 - 80)  BP: 120/66 (01-17-20 @ 08:01) (98/58 - 148/70)  RR: 18 (01-17-20 @ 08:01) (16 - 18)  SpO2: 96% (01-17-20 @ 08:01) (92% - 100%)  Wt(kg): --  I&O's Summary      Appearance: No Acute Distress	  HEENT:  Normal oral mucosa, PERRL, EOMI	  Cardiovascular: Normal S1 S2, No JVD, 2/6 heraclio  Respiratory: Lungs clear to auscultation bilaterally  Gastrointestinal:  Soft, Non-tender, + BS	  Skin: No rashes, No ecchymoses, No cyanosis	  Neurologic: Non-focal  Extremities: No clubbing, cyanosis or edema  Vascular: Peripheral pulses not palpable  Psychiatry: A & O x 3, Mood & affect appropriate    Laboratory Data:	 	    CBC Full  -  ( 16 Jan 2020 22:00 )  WBC Count : 7.47 K/uL  Hemoglobin : 12.7 g/dL  Hematocrit : 40.3 %  Platelet Count - Automated : 253 K/uL  Mean Cell Volume : 103.3 fl  Mean Cell Hemoglobin : 32.6 pg  Mean Cell Hemoglobin Concentration : 31.5 gm/dL  Auto Neutrophil # : 5.78 K/uL  Auto Lymphocyte # : 0.90 K/uL  Auto Monocyte # : 0.62 K/uL  Auto Eosinophil # : 0.10 K/uL  Auto Basophil # : 0.05 K/uL  Auto Neutrophil % : 77.4 %  Auto Lymphocyte % : 12.0 %  Auto Monocyte % : 8.3 %  Auto Eosinophil % : 1.3 %  Auto Basophil % : 0.7 %    01-17    139  |  92<L>  |  28<H>  ----------------------------<  303<H>  5.1   |  27  |  4.47<H>  01-16    136  |  88<L>  |  22  ----------------------------<  417<H>  5.5<H>   |  26  |  3.71<H>    Ca    9.6      17 Jan 2020 06:08  Ca    9.7      16 Jan 2020 22:00  Phos  5.1     01-17  Mg     2.2     01-17    TPro  6.9  /  Alb  4.1  /  TBili  0.3  /  DBili  0.1  /  AST  15  /  ALT  11  /  AlkPhos  154<H>  01-17  TPro  7.7  /  Alb  4.5  /  TBili  0.4  /  DBili  x   /  AST  16  /  ALT  10  /  AlkPhos  174<H>  01-16        Interpretation of Telemetry: 	nsr    ECG:  	sinus tach lateral twi       Assessment:  -sinus tach  -elevated cardiac enzymes  -CLI  -hx of NSVT  -ischemic cardiomyopathy c/b mod to severe LV dysfunction, cad s/p multiple stents  -esrd on hd  -PAD s/p prior intervention       Recs:  -sinus tach --> resolved s/p fluids. was endorsing diarrhea. possible component of hypovolemia. hold off on additional fluids at this time as has a tendency to go into pulm edema  -elevated CE without acute ischemic changes on ecg and no true ischemic sx. likely demand --> defer ischemic eval at this time. -known extensive CAD and ICM. s/p [facility] 2/20/2019 --> severe and diffuse instent restenosis along RCA --> unable to stent due to multiple layers of stenting and prior brachytherapy. denies any true ischemic sx at present  -optimization of volume status and electrolyte abnormalities with hd  -c/w beta blockers for nsvt/pat/cad (as above). uptitrate GDMT as tolerates. patient previously declined ICD for primary prevention  -hx of prolonged qtc. avoid qtc prolonging meds  -s/p TTE 2019: mod-severe MR, pseudo AS (low flow-low gradient), mod-severe LV dysfunction --> recent CTS consult with dr hebert appreciated. plan is for medical management given surgical risk and patient preference  -c/w asa, plavix, statin for ischemic cardiomyopathy/CAD/PAD      Greater than 50 minutes spent on total encounter; more than 50% of the visit was spent counseling and/or coordinating care by the attending physician.   	  Julián Biswas MD   Cardiovascular Diseases  [phone]

## 2020-01-17 NOTE — H&P ADULT - PROBLEM SELECTOR PROBLEM 6
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

## 2020-01-17 NOTE — H&P ADULT - HISTORY OF PRESENT ILLNESS
69F w/ CAD s/p multiple NICOLAS, ESRD on HD M, T, Th, Sat, CHF EF 30%, DM1 on insulin, TIA, COPD, severe peripheral artery disease s/p b/l fempop bypass c/b left thrombosed graft, left subclavian vein stenosis s/p stent, severe AS, hilar lymphadenopathy of unknown etiology, recent hospitalization for pneumonia d/c 12/9/19 p/w tachycardia. Pt states she went for her usual dialysis session today and dialysis nurse became worried regarding her HR of 104. Pt states she completed her dialysis session without any subjective complaints and then was sent to see her PMD. In PMD office pt states her HR was around 107 and she was sent to ER for further evaluation. Pt states only symptomatic complaints is watery BM this AM but not subsequent episodes. Denies chest pain, SOB, palpitations, dizziness, LE edema, fever, chills.     In ER: Although  is stated as being given, reviewed with RN, bag still hanging by bedside not yet given.

## 2020-01-17 NOTE — CONSULT NOTE ADULT - SUBJECTIVE AND OBJECTIVE BOX
[facility]  [phone]  NEPHROLOGY      INITIAL CONSULT NOTE  --------------------------------------------------------------------------------  HPI:      69F w/ CAD s/p multiple NICOLAS, ESRD on HD M, T, Th, Sat, CHF EF 30%, DM1 on insulin, TIA, COPD, severe peripheral artery disease s/p b/l fempop bypass c/b left thrombosed graft, left subclavian vein stenosis s/p stent, severe AS, hilar lymphadenopathy of unknown etiology, recent hospitalization for pneumonia d/c 12/9/19 p/w tachycardia. Pt states she went for her usual dialysis session today and dialysis nurse became worried regarding her HR of 104. Pt states she completed her dialysis session without any subjective complaints and then was sent to see her PMD. In PMD office pt states her HR was around 107 and she was sent to ER for further evaluation. given hx of esrd. renal consult called for esrd   this week had LE angio and had thrombectomy avf stenosis     PAST HISTORY  --------------------------------------------------------------------------------  PAST MEDICAL & SURGICAL HISTORY:  COPD (chronic obstructive pulmonary disease)  Localized enlarged lymph nodes  CHF (congestive heart failure): EF 40-45%  Subclavian vein stenosis, left: s/p stent  DKA, type 1: 1/2015  ACS (acute coronary syndrome): 1/2015 - cath revealed 100% ostial stenosis not amenable to PCI - medical management  TIA (transient ischemic attack): x 2 - 8-9 years ago prior to ASD/VSD repair  CAD (coronary artery disease): s/p stents  Gout: past  CVA (cerebral infarction): with no residual, 8 yrs ago, prior to heart surgery - ST memory loss  Peripheral vascular disease: occluded left fem-pop bypass 5/2015  Diabetes mellitus type 1: Insulin Dependent -  ESRD (end stage renal disease): dialysis  M, tue, thursday, saturday  Hyperlipidemia  Status post device closure of ASD: &quot;[place]&quot;  History of cardiac catheterization: 1/2015 - no intervention  S/P femoral-popliteal bypass surgery: L and R in 2013 with graft; 5/2015 CFA angioplasty left and ileofemoral endarterectomywith vein patch angioplasty of left fem-pop bypass graft  Multiple vascular surgery both leg, left fempop bypass revision 11/2015  AV (arteriovenous fistula): Left AV graft; revision with stent placement 2-3 years ago  S/P cholecystectomy    FAMILY HISTORY:  Family history of smoking  Family history of hypertension  Family history of cancer (Sibling)    PAST SOCIAL HISTORY:    ALLERGIES & MEDICATIONS  --------------------------------------------------------------------------------  Allergies    No Known Allergies    Intolerances      Standing Inpatient Medications  aspirin enteric coated 81 milliGRAM(s) Oral at bedtime  atorvastatin 20 milliGRAM(s) Oral at bedtime  buDESOnide    Inhalation Suspension 0.5 milliGRAM(s) Inhalation at bedtime  clopidogrel Tablet 75 milliGRAM(s) Oral at bedtime  dextrose 5%. 1000 milliLiter(s) IV Continuous <Continuous>  dextrose 50% Injectable 12.5 Gram(s) IV Push once  dextrose 50% Injectable 25 Gram(s) IV Push once  dextrose 50% Injectable 25 Gram(s) IV Push once  insulin glargine Injectable (LANTUS) 13 Unit(s) SubCutaneous at bedtime  insulin lispro (HumaLOG) corrective regimen sliding scale   SubCutaneous three times a day before meals  insulin lispro (HumaLOG) corrective regimen sliding scale   SubCutaneous at bedtime  insulin lispro Injectable (HumaLOG) 4 Unit(s) SubCutaneous three times a day before meals  pantoprazole    Tablet 40 milliGRAM(s) Oral before breakfast  sodium chloride 0.9%. 125 milliLiter(s) IV Continuous <Continuous>    PRN Inpatient Medications  dextrose 40% Gel 15 Gram(s) Oral once PRN  glucagon  Injectable 1 milliGRAM(s) IntraMuscular once PRN      REVIEW OF SYSTEMS  --------------------------------------------------------------------------------  Gen: No  fevers/chills   Skin: No rashes  Head/Eyes/Ears/Mouth: No headache; Normal hearing;  No sinus pain/discomfort, sore throat  Respiratory: No dyspnea, cough, wheezing, hemoptysis  CV: No chest pain, orthopnea  GI: No abdominal pain,  1 bm diarrhea, denies nausea, vomiting,   : No dysuria  MSK: No joint pain/swelling; no back pain  Neuro: No dizziness/lightheadedness, weakness,  also with no edema     All other systems were reviewed and are negative, except as noted.    VITALS/PHYSICAL EXAM  --------------------------------------------------------------------------------  T(C): 36.7 (01-17-20 @ 20:55), Max: 36.8 (01-17-20 @ 05:31)  HR: 81 (01-17-20 @ 20:55) (68 - 81)  BP: 143/63 (01-17-20 @ 20:55) (98/58 - 160/52)  RR: 18 (01-17-20 @ 20:55) (18 - 18)  SpO2: 94% (01-17-20 @ 20:55) (92% - 100%)  Wt(kg): --  Height (cm): 162.56 (01-16-20 @ 19:18)  Weight (kg): 53.5 (01-16-20 @ 19:18)  BMI (kg/m2): 20.2 (01-16-20 @ 19:18)  BSA (m2): 1.56 (01-16-20 @ 19:18)      01-17-20 @ 07:01  -  01-17-20 @ 22:37  --------------------------------------------------------  IN: 360 mL / OUT: 0 mL / NET: 360 mL      Physical Exam:  	Gen: Non toxic comfortable appearing   	no jvd  	Pulm: decrease bs  no rales or ronchi or wheezing  	CV: RRR, S1S2; no rub  	Back: No CVA tenderness  	Abd: +BS, soft, nontender/nondistended  	: No suprapubic tenderness  	UE: Warm, no cyanosis  no clubbing,  LLE edema 2 +   	LE: Warm, no cyanosis  no clubbing, no edema  	Neuro: alert and oriented. speech coherent   	Psych: Normal affect and mood  	Skin: Warm, no decrease skin turgor   	Vascular access: + LUE with bruit and thrill     LABS/STUDIES  --------------------------------------------------------------------------------              10.9   5.33  >-----------<  247      [01-17-20 @ 09:46]              35.2     139  |  92  |  28  ----------------------------<  303      [01-17-20 @ 06:08]  5.1   |  27  |  4.47        Ca     9.6     [01-17-20 @ 06:08]      Mg     2.2     [01-17-20 @ 06:08]      Phos  5.1     [01-17-20 @ 06:08]    TPro  6.9  /  Alb  4.1  /  TBili  0.3  /  DBili  0.1  /  AST  15  /  ALT  11  /  AlkPhos  154  [01-17-20 @ 06:08]    PT/INR: PT 11.0 , INR 0.96       [01-16-20 @ 22:00]  PTT: 29.6       [01-16-20 @ 22:00]          [01-16-20 @ 22:00]    Creatinine Trend:  SCr 4.47 [01-17 @ 06:08]  SCr 3.71 [01-16 @ 22:00]  SCr 7.17 [01-10 @ 09:44]  SCr 7.56 [01-08 @ 15:37]  SCr 5.04 [01-07 @ 05:59]    Urinalysis - [06-04-17 @ 08:24]      Color Yellow / Appearance Clear / SG 1.013 / pH 8.5      Gluc 1000 / Ketone Negative  / Bili Negative / Urobili Negative       Blood Negative / Protein >600 / Leuk Est Small / Nitrite Negative      RBC 3-5 / WBC 6-10 / Hyaline  / Gran  / Sq Epi  / Non Sq Epi OCC / Bacteria       Iron 67, TIBC 234, %sat 29      [12-04-19 @ 08:38]  Ferritin 1021      [12-04-19 @ 08:40]  PTH -- (Ca 8.3)      [12-04-19 @ 08:38]   952  PTH -- (Ca 9.6)      [06-17-19 @ 18:06]   177  PTH -- (Ca 7.8)      [03-29-19 @ 20:38]   73  PTH -- (Ca 7.3)      [02-22-19 @ 02:49]   59  HbA1c 8.7      [11-13-19 @ 23:44]  TSH 1.78      [11-14-19 @ 09:15]  Lipid: chol 108, TG 76, HDL 57, LDL 36      [11-14-19 @ 09:16]    HBsAb <3.0      [01-03-20 @ 03:47]  HBsAb Nonreact      [11-14-19 @ 04:43]  HBsAg Nonreact      [01-03-20 @ 03:47]  HBcAb Nonreact      [01-03-20 @ 03:47]  HCV 0.15, Nonreact      [11-14-19 @ 04:43]

## 2020-01-17 NOTE — H&P ADULT - ATTENDING COMMENTS
I was asked to see this patient by the hospitalist in charge. Dr. Viera to assume care for patient in AM and thereafter.

## 2020-01-18 ENCOUNTER — TRANSCRIPTION ENCOUNTER (OUTPATIENT)
Age: 65
End: 2020-01-18

## 2020-01-18 VITALS
SYSTOLIC BLOOD PRESSURE: 132 MMHG | TEMPERATURE: 98 F | HEART RATE: 82 BPM | OXYGEN SATURATION: 95 % | RESPIRATION RATE: 18 BRPM | DIASTOLIC BLOOD PRESSURE: 68 MMHG

## 2020-01-18 LAB
ALBUMIN SERPL ELPH-MCNC: 3.8 G/DL — SIGNIFICANT CHANGE UP (ref 3.3–5)
ALP SERPL-CCNC: 138 U/L — HIGH (ref 40–120)
ALT FLD-CCNC: 8 U/L — LOW (ref 10–45)
ANION GAP SERPL CALC-SCNC: 15 MMOL/L — SIGNIFICANT CHANGE UP (ref 5–17)
AST SERPL-CCNC: 9 U/L — LOW (ref 10–40)
BILIRUB SERPL-MCNC: 0.2 MG/DL — SIGNIFICANT CHANGE UP (ref 0.2–1.2)
BUN SERPL-MCNC: 38 MG/DL — HIGH (ref 7–23)
CALCIUM SERPL-MCNC: 9 MG/DL — SIGNIFICANT CHANGE UP (ref 8.4–10.5)
CHLORIDE SERPL-SCNC: 91 MMOL/L — LOW (ref 96–108)
CO2 SERPL-SCNC: 25 MMOL/L — SIGNIFICANT CHANGE UP (ref 22–31)
CREAT SERPL-MCNC: 6.1 MG/DL — HIGH (ref 0.5–1.3)
GLUCOSE BLDC GLUCOMTR-MCNC: 137 MG/DL — HIGH (ref 70–99)
GLUCOSE BLDC GLUCOMTR-MCNC: 159 MG/DL — HIGH (ref 70–99)
GLUCOSE BLDC GLUCOMTR-MCNC: 262 MG/DL — HIGH (ref 70–99)
GLUCOSE SERPL-MCNC: 324 MG/DL — HIGH (ref 70–99)
HBA1C BLD-MCNC: 8.2 % — HIGH (ref 4–5.6)
HCT VFR BLD CALC: 30.7 % — LOW (ref 34.5–45)
HGB BLD-MCNC: 9.5 G/DL — LOW (ref 11.5–15.5)
MCHC RBC-ENTMCNC: 30.9 GM/DL — LOW (ref 32–36)
MCHC RBC-ENTMCNC: 32.3 PG — SIGNIFICANT CHANGE UP (ref 27–34)
MCV RBC AUTO: 104.4 FL — HIGH (ref 80–100)
PLATELET # BLD AUTO: 216 K/UL — SIGNIFICANT CHANGE UP (ref 150–400)
POTASSIUM SERPL-MCNC: 5.4 MMOL/L — HIGH (ref 3.5–5.3)
POTASSIUM SERPL-SCNC: 5.4 MMOL/L — HIGH (ref 3.5–5.3)
PROT SERPL-MCNC: 6.4 G/DL — SIGNIFICANT CHANGE UP (ref 6–8.3)
RBC # BLD: 2.94 M/UL — LOW (ref 3.8–5.2)
RBC # FLD: 13.7 % — SIGNIFICANT CHANGE UP (ref 10.3–14.5)
SODIUM SERPL-SCNC: 131 MMOL/L — LOW (ref 135–145)
TROPONIN T, HIGH SENSITIVITY RESULT: 348 NG/L — HIGH (ref 0–51)
WBC # BLD: 5.54 K/UL — SIGNIFICANT CHANGE UP (ref 3.8–10.5)
WBC # FLD AUTO: 5.54 K/UL — SIGNIFICANT CHANGE UP (ref 3.8–10.5)

## 2020-01-18 PROCEDURE — 82962 GLUCOSE BLOOD TEST: CPT

## 2020-01-18 PROCEDURE — 80048 BASIC METABOLIC PNL TOTAL CA: CPT

## 2020-01-18 PROCEDURE — 85610 PROTHROMBIN TIME: CPT

## 2020-01-18 PROCEDURE — 80053 COMPREHEN METABOLIC PANEL: CPT

## 2020-01-18 PROCEDURE — 99261: CPT

## 2020-01-18 PROCEDURE — 82550 ASSAY OF CK (CPK): CPT

## 2020-01-18 PROCEDURE — 84100 ASSAY OF PHOSPHORUS: CPT

## 2020-01-18 PROCEDURE — 85730 THROMBOPLASTIN TIME PARTIAL: CPT

## 2020-01-18 PROCEDURE — 93010 ELECTROCARDIOGRAM REPORT: CPT

## 2020-01-18 PROCEDURE — 99285 EMERGENCY DEPT VISIT HI MDM: CPT

## 2020-01-18 PROCEDURE — 94640 AIRWAY INHALATION TREATMENT: CPT

## 2020-01-18 PROCEDURE — 93005 ELECTROCARDIOGRAM TRACING: CPT | Mod: 76

## 2020-01-18 PROCEDURE — 85027 COMPLETE CBC AUTOMATED: CPT

## 2020-01-18 PROCEDURE — 99238 HOSP IP/OBS DSCHRG MGMT 30/<: CPT

## 2020-01-18 PROCEDURE — 80076 HEPATIC FUNCTION PANEL: CPT

## 2020-01-18 PROCEDURE — 83735 ASSAY OF MAGNESIUM: CPT

## 2020-01-18 PROCEDURE — 71046 X-RAY EXAM CHEST 2 VIEWS: CPT

## 2020-01-18 PROCEDURE — 84484 ASSAY OF TROPONIN QUANT: CPT

## 2020-01-18 PROCEDURE — 83036 HEMOGLOBIN GLYCOSYLATED A1C: CPT

## 2020-01-18 PROCEDURE — 82553 CREATINE MB FRACTION: CPT

## 2020-01-18 RX ORDER — HYDRALAZINE HCL 50 MG
1 TABLET ORAL
Qty: 0 | Refills: 0 | DISCHARGE

## 2020-01-18 RX ORDER — OXYCODONE HYDROCHLORIDE 5 MG/1
2.5 TABLET ORAL ONCE
Refills: 0 | Status: DISCONTINUED | OUTPATIENT
Start: 2020-01-18 | End: 2020-01-18

## 2020-01-18 RX ORDER — OXYCODONE HYDROCHLORIDE 5 MG/1
5 TABLET ORAL ONCE
Refills: 0 | Status: DISCONTINUED | OUTPATIENT
Start: 2020-01-18 | End: 2020-01-18

## 2020-01-18 RX ADMIN — Medication 4 UNIT(S): at 13:42

## 2020-01-18 RX ADMIN — OXYCODONE HYDROCHLORIDE 5 MILLIGRAM(S): 5 TABLET ORAL at 12:07

## 2020-01-18 RX ADMIN — Medication 4 UNIT(S): at 08:37

## 2020-01-18 RX ADMIN — OXYCODONE HYDROCHLORIDE 2.5 MILLIGRAM(S): 5 TABLET ORAL at 00:52

## 2020-01-18 RX ADMIN — Medication 1: at 13:42

## 2020-01-18 RX ADMIN — Medication 3: at 08:37

## 2020-01-18 RX ADMIN — PANTOPRAZOLE SODIUM 40 MILLIGRAM(S): 20 TABLET, DELAYED RELEASE ORAL at 06:31

## 2020-01-18 RX ADMIN — OXYCODONE HYDROCHLORIDE 5 MILLIGRAM(S): 5 TABLET ORAL at 12:37

## 2020-01-18 RX ADMIN — Medication 4 UNIT(S): at 17:25

## 2020-01-18 RX ADMIN — OXYCODONE HYDROCHLORIDE 2.5 MILLIGRAM(S): 5 TABLET ORAL at 01:22

## 2020-01-18 NOTE — PROGRESS NOTE ADULT - SUBJECTIVE AND OBJECTIVE BOX
Patient is a 64y old  Female who presents with a chief complaint of Tachycardia (18 Jan 2020 14:01)      SUBJECTIVE / OVERNIGHT EVENTS: Comfortable without new complaints.   Review of Systems  chest pain no  palpitations no  sob no  nausea no  headache no    MEDICATIONS  (STANDING):  aspirin enteric coated 81 milliGRAM(s) Oral at bedtime  atorvastatin 20 milliGRAM(s) Oral at bedtime  buDESOnide    Inhalation Suspension 0.5 milliGRAM(s) Inhalation at bedtime  clopidogrel Tablet 75 milliGRAM(s) Oral at bedtime  dextrose 5%. 1000 milliLiter(s) (50 mL/Hr) IV Continuous <Continuous>  dextrose 50% Injectable 12.5 Gram(s) IV Push once  dextrose 50% Injectable 25 Gram(s) IV Push once  dextrose 50% Injectable 25 Gram(s) IV Push once  insulin glargine Injectable (LANTUS) 13 Unit(s) SubCutaneous at bedtime  insulin lispro (HumaLOG) corrective regimen sliding scale   SubCutaneous three times a day before meals  insulin lispro (HumaLOG) corrective regimen sliding scale   SubCutaneous at bedtime  insulin lispro Injectable (HumaLOG) 4 Unit(s) SubCutaneous three times a day before meals  pantoprazole    Tablet 40 milliGRAM(s) Oral before breakfast  sodium chloride 0.9%. 125 milliLiter(s) (125 mL/Hr) IV Continuous <Continuous>    MEDICATIONS  (PRN):  dextrose 40% Gel 15 Gram(s) Oral once PRN Blood Glucose LESS THAN 70 milliGRAM(s)/deciliter  glucagon  Injectable 1 milliGRAM(s) IntraMuscular once PRN Glucose LESS THAN 70 milligrams/deciliter      Vital Signs Last 24 Hrs  T(C): 36.4 (18 Jan 2020 12:25), Max: 36.7 (17 Jan 2020 20:55)  T(F): 97.6 (18 Jan 2020 12:25), Max: 98.1 (18 Jan 2020 05:18)  HR: 66 (18 Jan 2020 12:25) (66 - 81)  BP: 126/62 (18 Jan 2020 12:25) (126/62 - 160/52)  BP(mean): --  RR: 18 (18 Jan 2020 12:25) (18 - 18)  SpO2: 97% (18 Jan 2020 12:25) (94% - 98%)    PHYSICAL EXAM:  GENERAL: NAD, well-developed  HEAD:  Atraumatic, Normocephalic  EYES: EOMI, PERRLA, conjunctiva and sclera clear  NECK: Supple, No JVD  CHEST/LUNG: Clear to auscultation bilaterally; No wheeze  HEART: Regular rate and rhythm; No murmurs, rubs, or gallops  ABDOMEN: Soft, Nontender, Nondistended; Bowel sounds present  EXTREMITIES:  1-2+ bipedal edema L>R  PSYCH: AAOx3  NEUROLOGY: non-focal  SKIN: No rashes or lesions    LABS:                        9.5    5.54  )-----------( 216      ( 18 Jan 2020 09:41 )             30.7     01-18    131<L>  |  91<L>  |  38<H>  ----------------------------<  324<H>  5.4<H>   |  25  |  6.10<H>    Ca    9.0      18 Jan 2020 06:57  Phos  5.1     01-17  Mg     2.2     01-17    TPro  6.4  /  Alb  3.8  /  TBili  0.2  /  DBili  x   /  AST  9<L>  /  ALT  8<L>  /  AlkPhos  138<H>  01-18    PT/INR - ( 16 Jan 2020 22:00 )   PT: 11.0 sec;   INR: 0.96 ratio         PTT - ( 16 Jan 2020 22:00 )  PTT:29.6 sec  CARDIAC MARKERS ( 16 Jan 2020 22:00 )  x     / x     / 149 U/L / x     / 7.9 ng/mL            RADIOLOGY & ADDITIONAL TESTS:    Imaging Personally Reviewed:    Consultant(s) Notes Reviewed:      Care Discussed with Consultants/Other Providers:

## 2020-01-18 NOTE — DISCHARGE NOTE PROVIDER - NSDCMRMEDTOKEN_GEN_ALL_CORE_FT
acetaminophen 325 mg oral tablet: 2 tab(s) orally every 6 hours, As Needed - 3), Moderate Pain (4 - 6)  aspirin 81 mg oral delayed release tablet: 1 tab(s) orally once a day  atorvastatin 20 mg oral tablet: 1 tab(s) orally once a day  Basaglar KwikPen 100 units/mL subcutaneous solution: 13 unit(s) subcutaneous once a day (at bedtime)  budesonide 0.5 mg/2 mL inhalation suspension: 2 milliliter(s) inhaled 2 times a day  clopidogrel 75 mg oral tablet: 1 tab(s) orally once a day  HumaLOG KwikPen 100 units/mL injectable solution: 3-4 unit(s) subcutaneous 3 times a day - Family to adjust insulin levels based on PO intake and fingersticks as previously discussed with the Endocrinology Team  hydrALAZINE 100 mg oral tablet: 1 tab(s) orally 2 times a day in afternoon and night  hydrALAZINE 50 mg oral tablet: 1 tab(s) orally once a day in AM  ipratropium-albuterol 0.5 mg-2.5 mg/3 mLinhalation solution: 3 milliliter(s) inhaled every 6 hours  pantoprazole 40 mg oral delayed release tablet: 1 tab(s) orally once a day  rolling walker: ICD: S92.535A  sevelamer carbonate 800 mg oral tablet: 1 tab(s) orally 3 times a day (with meals)  Toprol-XL 50 mg oral tablet, extended release: 1 tab(s) orally once a day (at bedtime) acetaminophen 325 mg oral tablet: 2 tab(s) orally every 6 hours, As Needed - 3), Moderate Pain (4 - 6)  aspirin 81 mg oral delayed release tablet: 1 tab(s) orally once a day  atorvastatin 20 mg oral tablet: 1 tab(s) orally once a day  Basaglar KwikPen 100 units/mL subcutaneous solution: 13 unit(s) subcutaneous once a day (at bedtime)  budesonide 0.5 mg/2 mL inhalation suspension: 2 milliliter(s) inhaled 2 times a day  clopidogrel 75 mg oral tablet: 1 tab(s) orally once a day  HumaLOG KwikPen 100 units/mL injectable solution: 3-4 unit(s) subcutaneous 3 times a day - Family to adjust insulin levels based on PO intake and fingersticks as previously discussed with the Endocrinology Team  hydrALAZINE 100 mg oral tablet: 1 tab(s) orally 2 times a day in afternoon and night  hydrALAZINE 50 mg oral tablet: 1 tab(s) orally once a day in AM  ipratropium-albuterol 0.5 mg-2.5 mg/3 mLinhalation solution: 3 milliliter(s) inhaled every 6 hours  pantoprazole 40 mg oral delayed release tablet: 1 tab(s) orally once a day  sevelamer carbonate 800 mg oral tablet: 1 tab(s) orally 3 times a day (with meals)  Toprol-XL 50 mg oral tablet, extended release: 1 tab(s) orally once a day (at bedtime) acetaminophen 325 mg oral tablet: 2 tab(s) orally every 6 hours, As Needed - 3), Moderate Pain (4 - 6)  aspirin 81 mg oral delayed release tablet: 1 tab(s) orally once a day  atorvastatin 20 mg oral tablet: 1 tab(s) orally once a day  Basaglar KwikPen 100 units/mL subcutaneous solution: 13 unit(s) subcutaneous once a day (at bedtime)  budesonide 0.5 mg/2 mL inhalation suspension: 2 milliliter(s) inhaled 2 times a day  clopidogrel 75 mg oral tablet: 1 tab(s) orally once a day  HumaLOG KwikPen 100 units/mL injectable solution: 3-4 unit(s) subcutaneous 3 times a day - Family to adjust insulin levels based on PO intake and fingersticks as previously discussed with the Endocrinology Team  ipratropium-albuterol 0.5 mg-2.5 mg/3 mLinhalation solution: 3 milliliter(s) inhaled every 6 hours  pantoprazole 40 mg oral delayed release tablet: 1 tab(s) orally once a day  sevelamer carbonate 800 mg oral tablet: 1 tab(s) orally 3 times a day (with meals)  Toprol-XL 50 mg oral tablet, extended release: 1 tab(s) orally once a day (at bedtime)

## 2020-01-18 NOTE — DISCHARGE NOTE NURSING/CASE MANAGEMENT/SOCIAL WORK - PATIENT PORTAL LINK FT
You can access the FollowMyHealth Patient Portal offered by [facility] by registering at the following website: [email or web address]. By joining [employer]’s FollowMyHealth portal, you will also be able to view your health information using other applications (apps) compatible with our system.

## 2020-01-18 NOTE — PROGRESS NOTE ADULT - ASSESSMENT
· Assessment		  69F w/ CAD s/p multiple NICOLAS, ESRD on HD M, T, Th, Sat, CHF EF 30%, DM1 on insulin, TIA, COPD, severe peripheral artery disease s/p b/l fempop bypass c/b left thrombosed graft, left subclavian vein stenosis s/p stent, severe AS, hilar lymphadenopathy   now admitted with tachycardia post hd     1- esrd  2- htn     hd f 160 3.5 hr 2 k bfr 400 dfr 600 2 liter   see hd flow sheet

## 2020-01-18 NOTE — PROGRESS NOTE ADULT - ASSESSMENT
69F w/ CAD s/p multiple NICOLAS, ESRD on HD M, T, Th, Sat, CHF EF 30%, DM1 on insulin, TIA, COPD, severe peripheral artery disease s/p b/l fempop bypass c/b left thrombosed graft, left subclavian vein stenosis s/p stent, severe AS, hilar lymphadenopathy of unknown etiology, recent hospitalization for pneumonia d/c 12/9/19 p/w tachycardia    Tachycardia.   - monitor vital signs for improvement  - telemetry   - cardiology consult.     Troponin level elevated.  - Troponin significantly elevated. Pt denies any cardiac or pulmonary symptoms. Could be falsely elevated in setting of dialysis but given severe coronary diease also concern for NSTEMI. More likely demand ischemia possibly in setting of volume depletion s/p dialysis and tachycardia. Note TWI in lateral leads which seem more prominent than during recent admission.  -Cardiology consult   -Telemetry.     Hyperkalemia.  -  K 5.5 on lab work. Note ESRD status  -Trend BMP in AM  -See below regarding nephrology consult and dialysis.      Diarrhea, unspecified type.  - Pt states no episodes since AM  - F/u GI PCR ordered by ER, if normal BM would d/c.     ESRD (end stage renal disease).  - On HD M, Tu, Th and Sat  - Nephrology consult.     Chronic systolic congestive heart failure.  - EF 30% also with significant valve disease.  - Holding Toprol given borderline BP.    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  -On Basaglar 13U QHS and 3-4U pre-meal insulin at home. Appreciate most recent endocrine recommendations  -Cont. Lantus 13U bedtime  -Cont. low dose sliding scale and 4U TIDAC insulin  -Endocrine consult in AM.     Essential hypertension.   - Trend vital signs, given borderline BP on most recent Vital signs check, will hold off BP meds for now  -Holding home Toprol  -Holding home hydralazine. Different dose between what patient takes at home and most recent admission. Pt takes hydralazine 50mg AM and 100mg afternoon and bedtime at home.   -If BP improves would restart Toprol first.     Peripheral vascular disease.  - severe peripheral artery disease s/p b/l fempop bypass c/b left thrombosed graft  -Also recent L toe fracture.  -Was evaluated by podiatry and vascular during recent admission.- follow with Dr. Purdill re L leg revascularization     Prophylactic measure.  - DVT PPx  -SCDs.    DCP home if arrangements for HD in place.    Pierce Viera MD pager [phone]

## 2020-01-18 NOTE — PROGRESS NOTE ADULT - SUBJECTIVE AND OBJECTIVE BOX
[facility]   [phone]  DIALYSIS NOTE  Chief Complaint: ESRD/Ongoing hemodialysis requirement. seen on hd     24 hour events/subjective:      pain left leg. states has procedure scheduled again with Dr. Hicks as outpt       ALLERGIES & MEDICATIONS  --------------------------------------------------------------------------------  Allergies    No Known Allergies    Intolerances      Standing Inpatient Medications  aspirin enteric coated 81 milliGRAM(s) Oral at bedtime  atorvastatin 20 milliGRAM(s) Oral at bedtime  buDESOnide    Inhalation Suspension 0.5 milliGRAM(s) Inhalation at bedtime  clopidogrel Tablet 75 milliGRAM(s) Oral at bedtime  dextrose 5%. 1000 milliLiter(s) IV Continuous <Continuous>  dextrose 50% Injectable 12.5 Gram(s) IV Push once  dextrose 50% Injectable 25 Gram(s) IV Push once  dextrose 50% Injectable 25 Gram(s) IV Push once  insulin glargine Injectable (LANTUS) 13 Unit(s) SubCutaneous at bedtime  insulin lispro (HumaLOG) corrective regimen sliding scale   SubCutaneous three times a day before meals  insulin lispro (HumaLOG) corrective regimen sliding scale   SubCutaneous at bedtime  insulin lispro Injectable (HumaLOG) 4 Unit(s) SubCutaneous three times a day before meals  pantoprazole    Tablet 40 milliGRAM(s) Oral before breakfast  sodium chloride 0.9%. 125 milliLiter(s) IV Continuous <Continuous>    PRN Inpatient Medications  dextrose 40% Gel 15 Gram(s) Oral once PRN  glucagon  Injectable 1 milliGRAM(s) IntraMuscular once PRN      REVIEW OF SYSTEMS  --------------------------------------------------------------------------------  no itching or rash  no fever or chill  no cp or palp   no sob or cough   no N/V/D/ no abd pain   ext  + LLE  edema        VITALS/PHYSICAL EXAM  --------------------------------------------------------------------------------  T(C): 36.4 (01-18-20 @ 12:25), Max: 36.7 (01-17-20 @ 20:55)  HR: 66 (01-18-20 @ 12:25) (66 - 81)  BP: 126/62 (01-18-20 @ 12:25) (126/62 - 160/52)  RR: 18 (01-18-20 @ 12:25) (18 - 18)  SpO2: 97% (01-18-20 @ 12:25) (94% - 98%)  Wt(kg): --  Height (cm): 162.56 (01-16-20 @ 19:18)  Weight (kg): 53.5 (01-16-20 @ 19:18)  BMI (kg/m2): 20.2 (01-16-20 @ 19:18)  BSA (m2): 1.56 (01-16-20 @ 19:18)      01-17-20 @ 07:01  -  01-18-20 @ 07:00  --------------------------------------------------------  IN: 360 mL / OUT: 0 mL / NET: 360 mL    01-18-20 @ 07:01  -  01-18-20 @ 14:01  --------------------------------------------------------  IN: 0 mL / OUT: 1500 mL / NET: -1500 mL      Physical Exam:  		  	Gen: Non toxic comfortable appearing   	no jvd  	Pulm: decrease bs  no rales or ronchi or wheezing  	CV: RRR, S1S2; no rub  	Abd: +BS, soft, nontender/nondistended  	: No suprapubic tenderness  	UE: Warm, no cyanosis  no clubbing,  LLE edema 2 +   	LE: Warm, no cyanosis  no clubbing, no edema  		      LABS/STUDIES  --------------------------------------------------------------------------------              9.5    5.54  >-----------<  216      [01-18-20 @ 09:41]              30.7     131  |  91  |  38  ----------------------------<  324      [01-18-20 @ 06:57]  5.4   |  25  |  6.10        Ca     9.0     [01-18-20 @ 06:57]      Mg     2.2     [01-17-20 @ 06:08]      Phos  5.1     [01-17-20 @ 06:08]    TPro  6.4  /  Alb  3.8  /  TBili  0.2  /  DBili  x   /  AST  9   /  ALT  8   /  AlkPhos  138  [01-18-20 @ 06:57]    PT/INR: PT 11.0 , INR 0.96       [01-16-20 @ 22:00]  PTT: 29.6       [01-16-20 @ 22:00]          [01-16-20 @ 22:00]            imp/suggest: ESRD      Hemodialysis Prescription:  	Access:  	Dialyzer: revaclear   	Blood Flow (mL/Min): 400  	Dialysate Flow (mL/Min): 600  	Target UF (Liters):  	Treatment Time:  	Potassium:   	Calcium: 2.5  	  YOLANDA    Vitamin D     continue with hd   see hd flow sheet

## 2020-01-18 NOTE — DISCHARGE NOTE PROVIDER - HOSPITAL COURSE
69F w/ CAD s/p multiple NICOLAS, ESRD on HD M, T, Th, Sat, CHF EF 30%, DM1 on insulin, TIA, COPD, severe peripheral artery disease s/p b/l fempop bypass c/b left thrombosed graft, left subclavian vein stenosis s/p stent, severe AS, hilar lymphadenopathy of unknown etiology, recent hospitalization for pneumonia d/c 12/9/19 p/w tachycardia. Pt states she went for her usual dialysis session today and dialysis nurse became worried regarding her HR of 104. Pt states she completed her dialysis session without any subjective complaints and then was sent to see her PMD. In PMD office pt states her HR was around 107 and she was sent to ER for further evaluation.  Tachycardia resolved post iv fluid. Pt. evalauted by cardiology inpt and nephrology. Pt. have no further events on tele and feels well. P.t deemed stable to be discharged.

## 2020-01-18 NOTE — DISCHARGE NOTE PROVIDER - CARE PROVIDER_API CALL
Julián Biswas)  Internal Medicine  [id number] [address] [place] [address]  Phone: [phone]  Fax: [phone]  Follow Up Time:

## 2020-02-05 NOTE — PHYSICAL THERAPY INITIAL EVALUATION ADULT - ADDITIONAL COMMENTS
cont- L & R fem-pop bypass  graft,  multiple vascular surgery both leg w/ fem-pop bypass revisions , Subclavian vein stenosis left s/p stent, ESRD on HD (Tu/Thr/Sat) via L AVF with oliguria, CVA with memory deficit, depression, chronic pain management for LLE on oxycodone who presents with chest pain and malaise found to have K of 7, and in DKA.    Patient was living at home with spouse and son. Has 10 steps to enter the home. Patient is independent in ADL's and ambulation with straight cane. (2) potential problem

## 2020-02-11 NOTE — INPATIENT CERTIFICATION FOR MEDICARE PATIENTS - THE STATUS OF COMORBIDITIES.
Results called to patient normal findings on her mammogram 2. The status of comorbities. (See ED/admit documents)

## 2020-02-25 ENCOUNTER — INPATIENT (INPATIENT)
Facility: HOSPITAL | Age: 65
LOS: 0 days | Discharge: HOME | DRG: 638 | End: 2020-02-26
Attending: INTERNAL MEDICINE | Admitting: INTERNAL MEDICINE
Payer: COMMERCIAL

## 2020-02-25 VITALS
RESPIRATION RATE: 18 BRPM | SYSTOLIC BLOOD PRESSURE: 121 MMHG | HEIGHT: 62 IN | WEIGHT: 119.93 LBS | TEMPERATURE: 98 F | HEART RATE: 79 BPM | DIASTOLIC BLOOD PRESSURE: 54 MMHG | OXYGEN SATURATION: 99 %

## 2020-02-25 DIAGNOSIS — E78.5 HYPERLIPIDEMIA, UNSPECIFIED: ICD-10-CM

## 2020-02-25 DIAGNOSIS — E10.641 TYPE 1 DIABETES MELLITUS WITH HYPOGLYCEMIA WITH COMA: ICD-10-CM

## 2020-02-25 DIAGNOSIS — E16.2 HYPOGLYCEMIA, UNSPECIFIED: ICD-10-CM

## 2020-02-25 DIAGNOSIS — Z98.89 OTHER SPECIFIED POSTPROCEDURAL STATES: Chronic | ICD-10-CM

## 2020-02-25 DIAGNOSIS — I10 ESSENTIAL (PRIMARY) HYPERTENSION: ICD-10-CM

## 2020-02-25 LAB
ALBUMIN SERPL ELPH-MCNC: 4.2 G/DL — SIGNIFICANT CHANGE UP (ref 3.3–5)
ALP SERPL-CCNC: 154 U/L — HIGH (ref 40–120)
ALT FLD-CCNC: 11 U/L — SIGNIFICANT CHANGE UP (ref 10–45)
ANION GAP SERPL CALC-SCNC: 20 MMOL/L — HIGH (ref 5–17)
ANISOCYTOSIS BLD QL: SLIGHT — SIGNIFICANT CHANGE UP
AST SERPL-CCNC: 22 U/L — SIGNIFICANT CHANGE UP (ref 10–40)
BASOPHILS # BLD AUTO: 0 K/UL — SIGNIFICANT CHANGE UP (ref 0–0.2)
BASOPHILS NFR BLD AUTO: 0 % — SIGNIFICANT CHANGE UP (ref 0–2)
BILIRUB SERPL-MCNC: 0.2 MG/DL — SIGNIFICANT CHANGE UP (ref 0.2–1.2)
BUN SERPL-MCNC: 44 MG/DL — HIGH (ref 7–23)
CALCIUM SERPL-MCNC: 9.3 MG/DL — SIGNIFICANT CHANGE UP (ref 8.4–10.5)
CHLORIDE SERPL-SCNC: 89 MMOL/L — LOW (ref 96–108)
CO2 SERPL-SCNC: 25 MMOL/L — SIGNIFICANT CHANGE UP (ref 22–31)
CREAT SERPL-MCNC: 7 MG/DL — HIGH (ref 0.5–1.3)
EOSINOPHIL # BLD AUTO: 0.22 K/UL — SIGNIFICANT CHANGE UP (ref 0–0.5)
EOSINOPHIL NFR BLD AUTO: 2.6 % — SIGNIFICANT CHANGE UP (ref 0–6)
GLUCOSE BLDC GLUCOMTR-MCNC: 114 MG/DL — HIGH (ref 70–99)
GLUCOSE BLDC GLUCOMTR-MCNC: 81 MG/DL — SIGNIFICANT CHANGE UP (ref 70–99)
GLUCOSE BLDC GLUCOMTR-MCNC: 87 MG/DL — SIGNIFICANT CHANGE UP (ref 70–99)
GLUCOSE SERPL-MCNC: 81 MG/DL — SIGNIFICANT CHANGE UP (ref 70–99)
HCT VFR BLD CALC: 34.3 % — LOW (ref 34.5–45)
HGB BLD-MCNC: 10.4 G/DL — LOW (ref 11.5–15.5)
LYMPHOCYTES # BLD AUTO: 0.51 K/UL — LOW (ref 1–3.3)
LYMPHOCYTES # BLD AUTO: 6.1 % — LOW (ref 13–44)
MACROCYTES BLD QL: SLIGHT — SIGNIFICANT CHANGE UP
MANUAL SMEAR VERIFICATION: SIGNIFICANT CHANGE UP
MCHC RBC-ENTMCNC: 30.3 GM/DL — LOW (ref 32–36)
MCHC RBC-ENTMCNC: 32 PG — SIGNIFICANT CHANGE UP (ref 27–34)
MCV RBC AUTO: 105.5 FL — HIGH (ref 80–100)
MONOCYTES # BLD AUTO: 0.72 K/UL — SIGNIFICANT CHANGE UP (ref 0–0.9)
MONOCYTES NFR BLD AUTO: 8.7 % — SIGNIFICANT CHANGE UP (ref 2–14)
NEUTROPHILS # BLD AUTO: 6.87 K/UL — SIGNIFICANT CHANGE UP (ref 1.8–7.4)
NEUTROPHILS NFR BLD AUTO: 82.6 % — HIGH (ref 43–77)
PLAT MORPH BLD: NORMAL — SIGNIFICANT CHANGE UP
PLATELET # BLD AUTO: 222 K/UL — SIGNIFICANT CHANGE UP (ref 150–400)
POTASSIUM SERPL-MCNC: 6.4 MMOL/L — CRITICAL HIGH (ref 3.5–5.3)
POTASSIUM SERPL-SCNC: 6.4 MMOL/L — CRITICAL HIGH (ref 3.5–5.3)
PROT SERPL-MCNC: 7.1 G/DL — SIGNIFICANT CHANGE UP (ref 6–8.3)
RBC # BLD: 3.25 M/UL — LOW (ref 3.8–5.2)
RBC # FLD: 14.9 % — HIGH (ref 10.3–14.5)
RBC BLD AUTO: SIGNIFICANT CHANGE UP
SODIUM SERPL-SCNC: 134 MMOL/L — LOW (ref 135–145)
WBC # BLD: 8.32 K/UL — SIGNIFICANT CHANGE UP (ref 3.8–10.5)
WBC # FLD AUTO: 8.32 K/UL — SIGNIFICANT CHANGE UP (ref 3.8–10.5)

## 2020-02-25 PROCEDURE — 99291 CRITICAL CARE FIRST HOUR: CPT | Mod: GC

## 2020-02-25 PROCEDURE — 93010 ELECTROCARDIOGRAM REPORT: CPT

## 2020-02-25 PROCEDURE — 99223 1ST HOSP IP/OBS HIGH 75: CPT | Mod: GC

## 2020-02-25 PROCEDURE — 71045 X-RAY EXAM CHEST 1 VIEW: CPT | Mod: 26

## 2020-02-25 RX ORDER — INSULIN GLARGINE 100 [IU]/ML
13 INJECTION, SOLUTION SUBCUTANEOUS AT BEDTIME
Refills: 0 | Status: DISCONTINUED | OUTPATIENT
Start: 2020-02-25 | End: 2020-02-26

## 2020-02-25 RX ORDER — DEXTROSE 50 % IN WATER 50 %
25 SYRINGE (ML) INTRAVENOUS ONCE
Refills: 0 | Status: COMPLETED | OUTPATIENT
Start: 2020-02-25 | End: 2020-02-25

## 2020-02-25 RX ORDER — DEXTROSE 50 % IN WATER 50 %
15 SYRINGE (ML) INTRAVENOUS ONCE
Refills: 0 | Status: DISCONTINUED | OUTPATIENT
Start: 2020-02-25 | End: 2020-02-26

## 2020-02-25 RX ORDER — DEXTROSE 50 % IN WATER 50 %
12.5 SYRINGE (ML) INTRAVENOUS ONCE
Refills: 0 | Status: DISCONTINUED | OUTPATIENT
Start: 2020-02-25 | End: 2020-02-26

## 2020-02-25 RX ORDER — SODIUM BICARBONATE 1 MEQ/ML
50 SYRINGE (ML) INTRAVENOUS ONCE
Refills: 0 | Status: COMPLETED | OUTPATIENT
Start: 2020-02-25 | End: 2020-02-25

## 2020-02-25 RX ORDER — BUDESONIDE, MICRONIZED 100 %
0.5 POWDER (GRAM) MISCELLANEOUS
Refills: 0 | Status: DISCONTINUED | OUTPATIENT
Start: 2020-02-25 | End: 2020-02-26

## 2020-02-25 RX ORDER — DEXTROSE 50 % IN WATER 50 %
25 SYRINGE (ML) INTRAVENOUS ONCE
Refills: 0 | Status: DISCONTINUED | OUTPATIENT
Start: 2020-02-25 | End: 2020-02-26

## 2020-02-25 RX ORDER — HEPARIN SODIUM 5000 [USP'U]/ML
5000 INJECTION INTRAVENOUS; SUBCUTANEOUS EVERY 12 HOURS
Refills: 0 | Status: DISCONTINUED | OUTPATIENT
Start: 2020-02-25 | End: 2020-02-26

## 2020-02-25 RX ORDER — INSULIN LISPRO 100/ML
VIAL (ML) SUBCUTANEOUS
Refills: 0 | Status: DISCONTINUED | OUTPATIENT
Start: 2020-02-25 | End: 2020-02-26

## 2020-02-25 RX ORDER — ASPIRIN/CALCIUM CARB/MAGNESIUM 324 MG
81 TABLET ORAL DAILY
Refills: 0 | Status: DISCONTINUED | OUTPATIENT
Start: 2020-02-25 | End: 2020-02-26

## 2020-02-25 RX ORDER — IPRATROPIUM/ALBUTEROL SULFATE 18-103MCG
3 AEROSOL WITH ADAPTER (GRAM) INHALATION EVERY 6 HOURS
Refills: 0 | Status: DISCONTINUED | OUTPATIENT
Start: 2020-02-25 | End: 2020-02-26

## 2020-02-25 RX ORDER — INSULIN LISPRO 100/ML
VIAL (ML) SUBCUTANEOUS AT BEDTIME
Refills: 0 | Status: DISCONTINUED | OUTPATIENT
Start: 2020-02-25 | End: 2020-02-26

## 2020-02-25 RX ORDER — INSULIN LISPRO 100/ML
3 VIAL (ML) SUBCUTANEOUS
Refills: 0 | Status: DISCONTINUED | OUTPATIENT
Start: 2020-02-25 | End: 2020-02-26

## 2020-02-25 RX ORDER — CLOPIDOGREL BISULFATE 75 MG/1
75 TABLET, FILM COATED ORAL DAILY
Refills: 0 | Status: DISCONTINUED | OUTPATIENT
Start: 2020-02-25 | End: 2020-02-26

## 2020-02-25 RX ORDER — SEVELAMER CARBONATE 2400 MG/1
800 POWDER, FOR SUSPENSION ORAL
Refills: 0 | Status: DISCONTINUED | OUTPATIENT
Start: 2020-02-25 | End: 2020-02-26

## 2020-02-25 RX ORDER — SODIUM ZIRCONIUM CYCLOSILICATE 10 G/10G
5 POWDER, FOR SUSPENSION ORAL ONCE
Refills: 0 | Status: COMPLETED | OUTPATIENT
Start: 2020-02-25 | End: 2020-02-25

## 2020-02-25 RX ORDER — GLUCAGON INJECTION, SOLUTION 0.5 MG/.1ML
1 INJECTION, SOLUTION SUBCUTANEOUS ONCE
Refills: 0 | Status: DISCONTINUED | OUTPATIENT
Start: 2020-02-25 | End: 2020-02-26

## 2020-02-25 RX ORDER — INSULIN HUMAN 100 [IU]/ML
4 INJECTION, SOLUTION SUBCUTANEOUS ONCE
Refills: 0 | Status: COMPLETED | OUTPATIENT
Start: 2020-02-25 | End: 2020-02-25

## 2020-02-25 RX ORDER — INSULIN LISPRO 100/ML
4 VIAL (ML) SUBCUTANEOUS
Qty: 0 | Refills: 0 | DISCHARGE

## 2020-02-25 RX ORDER — PANTOPRAZOLE SODIUM 20 MG/1
40 TABLET, DELAYED RELEASE ORAL
Refills: 0 | Status: DISCONTINUED | OUTPATIENT
Start: 2020-02-25 | End: 2020-02-26

## 2020-02-25 RX ORDER — ATORVASTATIN CALCIUM 80 MG/1
20 TABLET, FILM COATED ORAL AT BEDTIME
Refills: 0 | Status: DISCONTINUED | OUTPATIENT
Start: 2020-02-25 | End: 2020-02-26

## 2020-02-25 RX ORDER — ACETAMINOPHEN 500 MG
650 TABLET ORAL EVERY 6 HOURS
Refills: 0 | Status: DISCONTINUED | OUTPATIENT
Start: 2020-02-25 | End: 2020-02-26

## 2020-02-25 RX ORDER — SODIUM CHLORIDE 9 MG/ML
1000 INJECTION, SOLUTION INTRAVENOUS
Refills: 0 | Status: DISCONTINUED | OUTPATIENT
Start: 2020-02-25 | End: 2020-02-26

## 2020-02-25 RX ORDER — METOPROLOL TARTRATE 50 MG
50 TABLET ORAL DAILY
Refills: 0 | Status: DISCONTINUED | OUTPATIENT
Start: 2020-02-25 | End: 2020-02-26

## 2020-02-25 RX ADMIN — SODIUM ZIRCONIUM CYCLOSILICATE 5 GRAM(S): 10 POWDER, FOR SUSPENSION ORAL at 12:31

## 2020-02-25 RX ADMIN — Medication 50 MILLIEQUIVALENT(S): at 12:31

## 2020-02-25 RX ADMIN — Medication 25 MILLILITER(S): at 11:00

## 2020-02-25 RX ADMIN — ATORVASTATIN CALCIUM 20 MILLIGRAM(S): 80 TABLET, FILM COATED ORAL at 22:58

## 2020-02-25 RX ADMIN — INSULIN HUMAN 4 UNIT(S): 100 INJECTION, SOLUTION SUBCUTANEOUS at 12:31

## 2020-02-25 RX ADMIN — Medication 25 MILLILITER(S): at 12:30

## 2020-02-25 NOTE — H&P ADULT - NSHPPHYSICALEXAM_GEN_ALL_CORE
PHYSICAL EXAMINATION:  Vital Signs Last 24 Hrs  T(C): 36.6 (25 Feb 2020 14:20), Max: 36.6 (25 Feb 2020 11:15)  T(F): 97.9 (25 Feb 2020 14:20), Max: 97.9 (25 Feb 2020 14:20)  HR: 73 (25 Feb 2020 14:20) (73 - 79)  BP: 124/63 (25 Feb 2020 14:20) (121/54 - 124/63)  BP(mean): --  RR: 18 (25 Feb 2020 14:20) (18 - 18)  SpO2: 92% (25 Feb 2020 14:20) (92% - 99%)  CAPILLARY BLOOD GLUCOSE      POCT Blood Glucose.: 235 mg/dL (25 Feb 2020 12:23)  POCT Blood Glucose.: 243 mg/dL (25 Feb 2020 11:53)  POCT Blood Glucose.: 294 mg/dL (25 Feb 2020 11:04)  POCT Blood Glucose.: 92 mg/dL (25 Feb 2020 10:52)      GENERAL: NAD  HEAD:  atraumatic, normocephalic  EYES: sclera anicteric  ENMT: mucous membranes moist  NECK: supple, No JVD  CHEST/LUNG: clear to auscultation bilaterally; no rales, rhonchi, or wheezing b/l  HEART: normal S1, S2  ABDOMEN: BS+, soft, ND, NT   EXTREMITIES:  L leg edema (old)  NEURO: awake, alert, interactive; moves all extremities  SKIN: no rashes or lesions

## 2020-02-25 NOTE — ED PROVIDER NOTE - OBJECTIVE STATEMENT
Attending note (Pierre): 65 y/o F with h/o ESRD (T/Th/Sat), CHF, COPD, prior CVA (no residual deficits), HLd, CAD (s/p stents), brought to ED by EMS for episode of AMS; patient was reportedly in a store buying donuts (states that she was feeling like her blood sugar was low) and juice; but before she could eat became lightheaded and sat down; store employee called 911 and EMS arrived shortly after, found her to be very confused/mumbling and had a FSG of 20; patient ate donut and drank juice, and had rapid improvemetn in her mental status, now awake/talking, but on arrival to ED, per EMS, started closing eyes more and reports now feeling "sleepy."  Patient able to relate that she took humalin last night and did not eat breakfast as per her normal routine as she usually eats while at dialysis.  Was on her way to dialysis but did not get to center or have dialysis today.

## 2020-02-25 NOTE — ED PROVIDER NOTE - PROGRESS NOTE DETAILS
Attending note (Pierre): FSG 92 here but given mental status, 1 Amp  D50 given nd repeat FSG now >200; patient more awake, keeping eyes open; giving some PO trial.  Will continue to monitor, afebrile, otherwise stable.  Pending labs and repeat FSG to trend glucose while in ED. Attending note (Pierre): FSG 92 here but given mental status, 1 Amp  D50 given and repeat FSG now >200; patient more awake, keeping eyes open; giving some PO trial.  Will continue to monitor, afebrile, otherwise stable.  Pending labs and repeat FSG to trend glucose while in ED.

## 2020-02-25 NOTE — CONSULT NOTE ADULT - SUBJECTIVE AND OBJECTIVE BOX
[facility]  [phone]  NEPHROLOGY      INITIAL CONSULT NOTE  --------------------------------------------------------------------------------  HPI:      65 y/o F with h/o ESRD (T/Th/Sat), CHF, COPD, prior CVA (no residual deficits), HLd, CAD (s/p stents), brought to ED by EMS for episode of AMS; patient was reportedly in a store buying donuts (states that she was feeling like her blood sugar was low) and juice; but before she could eat became lightheaded and sat down; store employee called 911 and EMS arrived shortly after, found her to be very confused/mumbling and had a FSG of 20; patient ate donut and drank juice, and had rapid improvement in her mental status, now awake/talking, but on arrival to ED, per EMS, started closing eyes more and reports now feeling "sleepy."  Patient able to relate that she took humalin last night and did not eat breakfast as per her normal routine as she usually eats while at dialysis.  Was on her way to dialysis but did not get to center or have dialysis today. in er noticed with hyperkalemia. renal consult called.       PAST HISTORY  --------------------------------------------------------------------------------  PAST MEDICAL & SURGICAL HISTORY:  COPD (chronic obstructive pulmonary disease)  Localized enlarged lymph nodes  CHF (congestive heart failure): EF 40-45%  Subclavian vein stenosis, left: s/p stent  DKA, type 1: 1/2015  ACS (acute coronary syndrome): 1/2015 - cath revealed 100% ostial stenosis not amenable to PCI - medical management  TIA (transient ischemic attack): x 2 - 8-9 years ago prior to ASD/VSD repair  CAD (coronary artery disease): s/p stents  Gout: past  CVA (cerebral infarction): with no residual, 8 yrs ago, prior to heart surgery - ST memory loss  Peripheral vascular disease: occluded left fem-pop bypass 5/2015  Diabetes mellitus type 1: Insulin Dependent -  ESRD (end stage renal disease): dialysis  M, tue, thursday, saturday  Hyperlipidemia  Status post device closure of ASD: &quot;clamshell&quot;  History of cardiac catheterization: 1/2015 - no intervention  S/P femoral-popliteal bypass surgery: L and R in 2013 with graft; 5/2015 CFA angioplasty left and ileofemoral endarterectomywith vein patch angioplasty of left fem-pop bypass graft  Multiple vascular surgery both leg, left fempop bypass revision 11/2015  AV (arteriovenous fistula): Left AV graft; revision with stent placement 2-3 years ago  S/P cholecystectomy    FAMILY HISTORY:  Family history of smoking  Family history of hypertension  Family history of cancer (Sibling)    PAST SOCIAL HISTORY: past tobacco use     ALLERGIES & MEDICATIONS  --------------------------------------------------------------------------------  Allergies    No Known Allergies    Intolerances      Standing Inpatient Medications  albuterol/ipratropium for Nebulization 3 milliLiter(s) Nebulizer every 6 hours  aspirin enteric coated 81 milliGRAM(s) Oral daily  atorvastatin 20 milliGRAM(s) Oral at bedtime  buDESOnide    Inhalation Suspension 0.5 milliGRAM(s) Inhalation two times a day  clopidogrel Tablet 75 milliGRAM(s) Oral daily  dextrose 5%. 1000 milliLiter(s) IV Continuous <Continuous>  dextrose 50% Injectable 12.5 Gram(s) IV Push once  dextrose 50% Injectable 25 Gram(s) IV Push once  dextrose 50% Injectable 25 Gram(s) IV Push once  heparin  Injectable 5000 Unit(s) SubCutaneous every 12 hours  insulin glargine Injectable (LANTUS) 13 Unit(s) SubCutaneous at bedtime  insulin lispro (HumaLOG) corrective regimen sliding scale   SubCutaneous three times a day before meals  insulin lispro (HumaLOG) corrective regimen sliding scale   SubCutaneous at bedtime  insulin lispro (HumaLOG) corrective regimen sliding scale   SubCutaneous <User Schedule>  insulin lispro Injectable (HumaLOG) 3 Unit(s) SubCutaneous three times a day before meals  metoprolol succinate ER 50 milliGRAM(s) Oral daily  pantoprazole    Tablet 40 milliGRAM(s) Oral before breakfast  sevelamer carbonate 800 milliGRAM(s) Oral three times a day with meals    PRN Inpatient Medications  acetaminophen   Tablet .. 650 milliGRAM(s) Oral every 6 hours PRN  dextrose 40% Gel 15 Gram(s) Oral once PRN  glucagon  Injectable 1 milliGRAM(s) IntraMuscular once PRN      REVIEW OF SYSTEMS  --------------------------------------------------------------------------------  Gen: No  fevers/chills   Skin: No rashes  Head/Eyes/Ears/Mouth: No headache; Normal hearing;  No sinus pain/discomfort, sore throat  Respiratory: No dyspnea, cough, wheezing  CV: No chest pain, orthopnea or palp   GI: No abdominal pain, diarrhea, nausea, vomiting,  : No dysuria,  MSK: No joint pain/swelling; no back pain  Neuro: No dizziness/lightheadedness,   also with  + c/o edema     All other systems were reviewed and are negative, except as noted.    VITALS/PHYSICAL EXAM  --------------------------------------------------------------------------------  T(C): 36.6 (02-25-20 @ 21:21), Max: 36.9 (02-25-20 @ 20:48)  HR: 74 (02-25-20 @ 21:21) (67 - 92)  BP: 158/76 (02-25-20 @ 21:21) (120/62 - 158/76)  RR: 18 (02-25-20 @ 21:21) (16 - 18)  SpO2: 97% (02-25-20 @ 21:21) (92% - 99%)  Wt(kg): --  Height (cm): 157.48 (02-25-20 @ 10:49)  Weight (kg): 54.4 (02-25-20 @ 10:49)  BMI (kg/m2): 21.9 (02-25-20 @ 10:49)  BSA (m2): 1.54 (02-25-20 @ 10:49)      Physical Exam:  	Gen: mildly lethargic with weak voice but responsive   	no jvd , supple neck,   	Pulm: decrease bs  no rales or ronchi or wheezing  	CV: RRR, S1S2; no rub  	Back: No CVA tenderness  	Abd: +BS, soft, nontender/nondistended  	: No suprapubic tenderness  	UE: Warm, no cyanosis  no clubbing,  no edema  	LE: Warm, no cyanosis  no clubbing, LLE 2+ pitting > RLE  edema  	Neuro: alert and oriented. speech coherent   		Skin: Warm, no decrease skin turgor   	Vascular access: + avf + bruit and thrill     LABS/STUDIES  --------------------------------------------------------------------------------              10.4   8.32  >-----------<  222      [02-25-20 @ 11:22]              34.3     134  |  89  |  44  ----------------------------<  81      [02-25-20 @ 11:22]  6.4   |  25  |  7.00        Ca     9.3     [02-25-20 @ 11:22]    TPro  7.1  /  Alb  4.2  /  TBili  0.2  /  DBili  x   /  AST  22  /  ALT  11  /  AlkPhos  154  [02-25-20 @ 11:22]          Creatinine Trend:  SCr 7.00 [02-25 @ 11:22]    Urinalysis - [06-04-17 @ 08:24]      Color Yellow / Appearance Clear / SG 1.013 / pH 8.5      Gluc 1000 / Ketone Negative  / Bili Negative / Urobili Negative       Blood Negative / Protein >600 / Leuk Est Small / Nitrite Negative      RBC 3-5 / WBC 6-10 / Hyaline  / Gran  / Sq Epi  / Non Sq Epi OCC / Bacteria       Iron 67, TIBC 234, %sat 29      [12-04-19 @ 08:38]  Ferritin 1021      [12-04-19 @ 08:40]  PTH -- (Ca 8.3)      [12-04-19 @ 08:38]   952  PTH -- (Ca 9.6)      [06-17-19 @ 18:06]   177  PTH -- (Ca 7.8)      [03-29-19 @ 20:38]   73  HbA1c 8.2      [01-18-20 @ 09:41]  TSH 1.78      [11-14-19 @ 09:15]  Lipid: chol 108, TG 76, HDL 57, LDL 36      [11-14-19 @ 09:16]    HBsAb <3.0      [01-03-20 @ 03:47]  HBsAb Nonreact      [11-14-19 @ 04:43]  HBsAg Nonreact      [01-03-20 @ 03:47]  HBcAb Nonreact      [01-03-20 @ 03:47]  HCV 0.15, Nonreact      [11-14-19 @ 04:43]

## 2020-02-25 NOTE — ED ADULT NURSE NOTE - OBJECTIVE STATEMENT
65 y/o female with pmhx of ESRD on HD q Tu/Th/Sa biba from HD center for hypoglycemia.  per ems, FS at center=20's; pt did not receive any dialysis before transferring to ED..  pt was given oral glucose at the HD center and transferred to ED.  upon arrival, FS=90's.  pt given 1 amp D50, per md's orders.  pt was drowsy but responsive to all stimuli.  no sob or respiratory distress.  pt denies any pain.  vss.  safety precautions in place.  will continue to monitor.

## 2020-02-25 NOTE — ED ADULT NURSE NOTE - NSIMPLEMENTINTERV_GEN_ALL_ED
Implemented All Fall Risk Interventions:  [place] to call system. Call bell, personal items and telephone within reach. Instruct patient to call for assistance. Room bathroom lighting operational. Non-slip footwear when patient is off stretcher. Physically safe environment: no spills, clutter or unnecessary equipment. Stretcher in lowest position, wheels locked, appropriate side rails in place. Provide visual cue, wrist band, yellow gown, etc. Monitor gait and stability. Monitor for mental status changes and reorient to person, place, and time. Review medications for side effects contributing to fall risk. Reinforce activity limits and safety measures with patient and family.

## 2020-02-25 NOTE — CONSULT NOTE ADULT - ATTENDING COMMENTS
Patient seen and examined. Agree with note as above with addendum: patient well known to me from previous admissions. Seen in HD suite just after she finish her session. Pt was lethargic but her bs was 114 so hypoglycemia is not to blame. Insulin as above. Will be seen by the diabetes NP tomorrow. Discussed with Dr. Schmidt as he made us aware of her admission.   Paris Colbert MD  Pager: [phone]  Nights and Weekends: [phone] Patient seen and examined. Agree with note as above with addendum: patient well known to me from previous admissions. Seen in HD suite just after she finish her session. Pt was lethargic but her bs was 114 so hypoglycemia is not to blame. Insulin as above. Pt with hypoglycemia unawareness so check 2am bs with a small scale to cover if bs over 251. Will be seen by the diabetes NP tomorrow. Discussed with Dr. Schmidt as he made us aware of her admission.   Paris Colbert MD  Pager: [phone]  Nights and Weekends: [phone]

## 2020-02-25 NOTE — ED PROVIDER NOTE - CLINICAL SUMMARY MEDICAL DECISION MAKING FREE TEXT BOX
Attending note (Pierre): 65 y/o F with h/o ESRD (T/Th/Sat), CHF, COPD, prior CVA (no residual deficits), HLd, CAD (s/p stents), brought to ED by EMS for episode of AMS; found to be hypoglycemic; FSG 92 here but given mental status, 1 Amp  D50 given nd repeat FSG now >200; patient more awake, keeping eyes open; giving some PO trial.  Will continue to monitor, afebrile, otherwise stable.  Pending labs (cbc, cmp) and repeat FSG to trend glucose while in ED.  Will d/w her nephrologist or dialysis center if stable for discharge. Attending note (Pierre): 65 y/o F with h/o ESRD (T/Th/Sat), CHF, COPD, prior CVA (no residual deficits), HLD, CAD (s/p stents), brought to ED by EMS for episode of AMS; found to be hypoglycemic; FSG 92 here but given mental status, 1 Amp  D50 given and repeat FSG now >200; patient more awake, keeping eyes open; giving some PO trial.  Will continue to monitor, afebrile, otherwise stable.  Pending labs (cbc, cmp) and repeat FSG to trend glucose while in ED.  Will d/w her nephrologist or dialysis center if stable for discharge.

## 2020-02-25 NOTE — ED ADULT NURSE REASSESSMENT NOTE - NS ED NURSE REASSESS COMMENT FT1
hyperkalemic cocktail administered, per md's orders for K=6.4.  pt placed on cardiac monitor.  will continue to assess.

## 2020-02-25 NOTE — CONSULT NOTE ADULT - SUBJECTIVE AND OBJECTIVE BOX
HPI:      PAST MEDICAL & SURGICAL HISTORY:  COPD (chronic obstructive pulmonary disease)  Localized enlarged lymph nodes  CHF (congestive heart failure): EF 40-45%  Subclavian vein stenosis, left: s/p stent  DKA, type 1: 1/2015  ACS (acute coronary syndrome): 1/2015 - cath revealed 100% ostial stenosis not amenable to PCI - medical management  TIA (transient ischemic attack): x 2 - 8-9 years ago prior to ASD/VSD repair  CAD (coronary artery disease): s/p stents  Gout: past  CVA (cerebral infarction): with no residual, 8 yrs ago, prior to heart surgery - ST memory loss  Peripheral vascular disease: occluded left fem-pop bypass 5/2015  Diabetes mellitus type 1: Insulin Dependent -  ESRD (end stage renal disease): dialysis  M, tue, thursday, saturday  Hyperlipidemia  Status post device closure of ASD: &quot;clamshell&quot;  History of cardiac catheterization: 1/2015 - no intervention  S/P femoral-popliteal bypass surgery: L and R in 2013 with graft; 5/2015 CFA angioplasty left and ileofemoral endarterectomywith vein patch angioplasty of left fem-pop bypass graft  Multiple vascular surgery both leg, left fempop bypass revision 11/2015  AV (arteriovenous fistula): Left AV graft; revision with stent placement 2-3 years ago  S/P cholecystectomy      FAMILY HISTORY:  Family history of smoking  Family history of hypertension  Family history of cancer (Sibling)      Social History:    Outpatient Medications:    MEDICATIONS  (STANDING):    MEDICATIONS  (PRN):      Allergies    No Known Allergies    Intolerances      Review of Systems:  Constitutional: No fever  Eyes: No blurry vision  Neuro: No tremors  HEENT: No pain  Cardiovascular: No chest pain, palpitations  Respiratory: No SOB, no cough  GI: No nausea, vomiting, abdominal pain  : No dysuria  Skin: no rash  Psych: no depression  Endocrine: no polyuria, polydipsia  Hem/lymph: no swelling  Osteoporosis: no fractures    ALL OTHER SYSTEMS REVIEWED AND NEGATIVE    UNABLE TO OBTAIN    PHYSICAL EXAM:  VITALS: T(C): 36.6 (02-25-20 @ 14:20)  T(F): 97.9 (02-25-20 @ 14:20), Max: 97.9 (02-25-20 @ 14:20)  HR: 73 (02-25-20 @ 14:20) (73 - 79)  BP: 124/63 (02-25-20 @ 14:20) (121/54 - 124/63)  RR:  (18 - 18)  SpO2:  (92% - 99%)  Wt(kg): --  GENERAL: NAD, well-groomed, well-developed  EYES: No proptosis, no lid lag, anicteric  HEENT:  Atraumatic, Normocephalic, moist mucous membranes  THYROID: Normal size, no palpable nodules  RESPIRATORY: Clear to auscultation bilaterally; No rales, rhonchi, wheezing  CARDIOVASCULAR: Regular rate and rhythm; No murmurs; no peripheral edema  GI: Soft, nontender, non distended, normal bowel sounds  SKIN: Dry, intact, No rashes or lesions  MUSCULOSKELETAL: Full range of motion, normal strength  NEURO: sensation intact, extraocular movements intact, no tremor  PSYCH: Alert and oriented x 3, normal affect, normal mood  CUSHING'S SIGNS: no striae    POCT Blood Glucose.: 235 mg/dL (02-25-20 @ 12:23)  POCT Blood Glucose.: 243 mg/dL (02-25-20 @ 11:53)  POCT Blood Glucose.: 294 mg/dL (02-25-20 @ 11:04)  POCT Blood Glucose.: 92 mg/dL (02-25-20 @ 10:52)                            10.4   8.32  )-----------( 222      ( 25 Feb 2020 11:22 )             34.3       02-25    134<L>  |  89<L>  |  44<H>  ----------------------------<  81  6.4<HH>   |  25  |  7.00<H>    EGFR if : 7<L>  EGFR if non : 6<L>    Ca    9.3      02-25    TPro  7.1  /  Alb  4.2  /  TBili  0.2  /  DBili  x   /  AST  22  /  ALT  11  /  AlkPhos  154<H>  02-25      Thyroid Function Tests:      Hemoglobin A1C, Whole Blood: 8.2 % <H> [4.0 - 5.6] (01-18-20 @ 09:41)            Radiology: HPI:  63y/o F well known to diabetes team from frequent admissions.. Pt w/h/o uncontrolled TIDM (A1C 8.2 Jan 2020)  c/b neuropathy and retinopathy as well as CAD s/p multiple stents, CHF (EF 30%), TIA, PVD s/p b/l fem-pop bypass, ESRD> HD, brought to ED by EMS for episode of AMS and hypoglycemia. patient was reportedly in a store buying donuts (states that she was feeling like her blood sugar was low) and juice; but before she could eat became lightheaded and sat down; store employee called 911 and EMS arrived shortly after, found her to be very confused/mumbling and had a FSG of 20; patient ate donut and drank juice, and had rapid improvement in her mental status.  Follows Dr. Ley. On basaglar 13 and humalog 3 units TID with meals.  FS typically 110 ACBK, AClunch 190-200, ACdinner 190-200, and bedtime low 200s. Endorses adherence to insulin. Denies recent hypoglycemia at home. last basaglar dose was 13 units previous night. In AM, pt did not eat breakfast as usual and was heading to HD where has breakfast. Patient said she follows a diabetic/renal diet at home. Reports recent decreased PO intake past few days. Otherwise in usual state of health, denies fevers, N/V, D/C, abd pain.     PAST MEDICAL & SURGICAL HISTORY:  COPD (chronic obstructive pulmonary disease)  Localized enlarged lymph nodes  CHF (congestive heart failure): EF 40-45%  Subclavian vein stenosis, left: s/p stent  DKA, type 1: 1/2015  ACS (acute coronary syndrome): 1/2015 - cath revealed 100% ostial stenosis not amenable to PCI - medical management  TIA (transient ischemic attack): x 2 - 8-9 years ago prior to ASD/VSD repair  CAD (coronary artery disease): s/p stents  Gout: past  CVA (cerebral infarction): with no residual, 8 yrs ago, prior to heart surgery - ST memory loss  Peripheral vascular disease: occluded left fem-pop bypass 5/2015  Diabetes mellitus type 1: Insulin Dependent -  ESRD (end stage renal disease): dialysis  M, tue, thursday, saturday  Hyperlipidemia  Status post device closure of ASD: &quot;clamshell&quot;  History of cardiac catheterization: 1/2015 - no intervention  S/P femoral-popliteal bypass surgery: L and R in 2013 with graft; 5/2015 CFA angioplasty left and ileofemoral endarterectomywith vein patch angioplasty of left fem-pop bypass graft  Multiple vascular surgery both leg, left fempop bypass revision 11/2015  AV (arteriovenous fistula): Left AV graft; revision with stent placement 2-3 years ago  S/P cholecystectomy      FAMILY HISTORY:  Family history of smoking  Family history of hypertension  Family history of cancer (Sibling)      Social History: quit tobacco in 2000. No etoh or illicit drug use      Outpatient Medications:  Basaglar 13 units qhs  humalog 3 units TID with meals    MEDICATIONS  (STANDING):    MEDICATIONS  (PRN):      Allergies    No Known Allergies      Review of Systems:  Constitutional: No fever, +feels sleepy  Eyes: No blurry vision  Neuro: No tremors  HEENT: No pain  Cardiovascular: No chest pain, palpitations  Respiratory: No SOB, no cough  GI: No nausea, vomiting, abdominal pain  : No dysuria  Skin: no rash  Endocrine: no polyuria, polydipsia  Hem/lymph: no swelling  Osteoporosis: no fractures    ALL OTHER SYSTEMS REVIEWED AND NEGATIVE      PHYSICAL EXAM:  VITALS: T(C): 36.6 (02-25-20 @ 14:20)  T(F): 97.9 (02-25-20 @ 14:20), Max: 97.9 (02-25-20 @ 14:20)  HR: 73 (02-25-20 @ 14:20) (73 - 79)  BP: 124/63 (02-25-20 @ 14:20) (121/54 - 124/63)  RR:  (18 - 18)  SpO2:  (92% - 99%)  Wt(kg): --  GENERAL: NAD, well-groomed, +thin  EYES: No proptosis, no lid lag, anicteric  HEENT:  Atraumatic, Normocephalic, moist mucous membranes  THYROID: Normal size, no palpable nodules  RESPIRATORY: Clear to auscultation bilaterally; No rales, rhonchi, wheezing  CARDIOVASCULAR: Regular rate and rhythm; No murmurs; R>L LE edema  GI: Soft, nontender, non distended, normal bowel sounds  SKIN: Dry, intact, No rashes or lesions  MUSCULOSKELETAL: Full range of motion, normal strength  NEURO: sensation intact, extraocular movements intact, no tremor  PSYCH: Alert and oriented x 3, flat affect  CUSHING'S SIGNS: no striae    POCT Blood Glucose.: 235 mg/dL (02-25-20 @ 12:23)  POCT Blood Glucose.: 243 mg/dL (02-25-20 @ 11:53)  POCT Blood Glucose.: 294 mg/dL (02-25-20 @ 11:04)  POCT Blood Glucose.: 92 mg/dL (02-25-20 @ 10:52)                            10.4   8.32  )-----------( 222      ( 25 Feb 2020 11:22 )             34.3       02-25    134<L>  |  89<L>  |  44<H>  ----------------------------<  81  6.4<HH>   |  25  |  7.00<H>    EGFR if : 7<L>  EGFR if non : 6<L>    Ca    9.3      02-25    TPro  7.1  /  Alb  4.2  /  TBili  0.2  /  DBili  x   /  AST  22  /  ALT  11  /  AlkPhos  154<H>  02-25      Hemoglobin A1C, Whole Blood: 8.2 % <H> [4.0 - 5.6] (01-18-20 @ 09:41) HPI:  63y/o F well known to diabetes team from frequent admissions. Pt w/h/o uncontrolled TIDM (A1C 8.2 Jan 2020)  c/b neuropathy and retinopathy as well as CAD s/p multiple stents, CHF (EF 30%), TIA, PVD s/p b/l fem-pop bypass, ESRD> HD, brought to ED by EMS for episode of AMS and hypoglycemia. patient was reportedly in a store buying donuts (states that she was feeling like her blood sugar was low) and juice; but before she could eat became lightheaded and sat down; store employee called 911 and EMS arrived shortly after, found her to be very confused/mumbling and had a FSG of 20; patient ate donut and drank juice, and had rapid improvement in her mental status.  Follows Dr. Ley. On basaglar 13 and humalog 3 units TID with meals.  FS typically 110 ACBK, AClunch 190-200, ACdinner 190-200, and bedtime low 200s. Endorses adherence to insulin. Denies recent hypoglycemia at home. last basaglar dose was 13 units previous night. In AM, pt did not eat breakfast as usual and was heading to HD where has breakfast. Patient said she follows a diabetic/renal diet at home. Reports recent decreased PO intake past few days. Otherwise in usual state of health, denies fevers, N/V, D/C, abd pain.     PAST MEDICAL & SURGICAL HISTORY:  COPD (chronic obstructive pulmonary disease)  Localized enlarged lymph nodes  CHF (congestive heart failure): EF 40-45%  Subclavian vein stenosis, left: s/p stent  DKA, type 1: 1/2015  ACS (acute coronary syndrome): 1/2015 - cath revealed 100% ostial stenosis not amenable to PCI - medical management  TIA (transient ischemic attack): x 2 - 8-9 years ago prior to ASD/VSD repair  CAD (coronary artery disease): s/p stents  Gout: past  CVA (cerebral infarction): with no residual, 8 yrs ago, prior to heart surgery - ST memory loss  Peripheral vascular disease: occluded left fem-pop bypass 5/2015  Diabetes mellitus type 1: Insulin Dependent -  ESRD (end stage renal disease): dialysis  M, tue, thursday, saturday  Hyperlipidemia  Status post device closure of ASD: &quot;clamshell&quot;  History of cardiac catheterization: 1/2015 - no intervention  S/P femoral-popliteal bypass surgery: L and R in 2013 with graft; 5/2015 CFA angioplasty left and ileofemoral endarterectomywith vein patch angioplasty of left fem-pop bypass graft  Multiple vascular surgery both leg, left fempop bypass revision 11/2015  AV (arteriovenous fistula): Left AV graft; revision with stent placement 2-3 years ago  S/P cholecystectomy      FAMILY HISTORY:  Family history of smoking  Family history of hypertension  Family history of cancer (Sibling)      Social History: quit tobacco in 2000. No etoh or illicit drug use      Outpatient Medications:  Basaglar 13 units qhs  humalog 3 units TID with meals    MEDICATIONS  (STANDING):    MEDICATIONS  (PRN):      Allergies  No Known Allergies      Review of Systems:  Constitutional: No fever, +feels sleepy  Eyes: No blurry vision  Neuro: No tremors  HEENT: No pain  Cardiovascular: No chest pain, palpitations  Respiratory: No SOB, no cough  GI: No nausea, vomiting, abdominal pain  : No dysuria  Skin: no rash  Endocrine: no polyuria, polydipsia  Hem/lymph: no swelling  Osteoporosis: no fractures  ALL OTHER SYSTEMS REVIEWED AND NEGATIVE      PHYSICAL EXAM:  VITALS: T(C): 36.6 (02-25-20 @ 14:20)  T(F): 97.9 (02-25-20 @ 14:20), Max: 97.9 (02-25-20 @ 14:20)  HR: 73 (02-25-20 @ 14:20) (73 - 79)  BP: 124/63 (02-25-20 @ 14:20) (121/54 - 124/63)  RR:  (18 - 18)  SpO2:  (92% - 99%)  Wt(kg): --  GENERAL: NAD, well-groomed, +thin  EYES: No proptosis, anicteric  HEENT:  Atraumatic, Normocephalic, moist mucous membranes  RESPIRATORY: Clear to auscultation bilaterally; No rales, rhonchi, wheezing  CARDIOVASCULAR: Regular rate and rhythm; No murmurs; R>L LE edema  GI: Soft, nontender, non distended, normal bowel sounds  SKIN: Dry, intact, No rashes or lesions on feet b/l  PSYCH: Alert and oriented x 3, +restricted affect      POCT Blood Glucose.: 235 mg/dL (02-25-20 @ 12:23)  POCT Blood Glucose.: 243 mg/dL (02-25-20 @ 11:53)  POCT Blood Glucose.: 294 mg/dL (02-25-20 @ 11:04)  POCT Blood Glucose.: 92 mg/dL (02-25-20 @ 10:52)                            10.4   8.32  )-----------( 222      ( 25 Feb 2020 11:22 )             34.3       02-25    134<L>  |  89<L>  |  44<H>  ----------------------------<  81  6.4<HH>   |  25  |  7.00<H>    EGFR if : 7<L>  EGFR if non : 6<L>    Ca    9.3      02-25    TPro  7.1  /  Alb  4.2  /  TBili  0.2  /  DBili  x   /  AST  22  /  ALT  11  /  AlkPhos  154<H>  02-25      Hemoglobin A1C, Whole Blood: 8.2 % <H> [4.0 - 5.6] (01-18-20 @ 09:41)

## 2020-02-25 NOTE — CONSULT NOTE ADULT - PROBLEM SELECTOR RECOMMENDATION 9
-Patient with Type 1 DM, A1C 8.2, with multiple complications, here with AMS 2/2 hypoglycemia. May be 2/2 recent decreased Po intake.  -Can start lantus 13 units qhs and humalog 3 units TID with meal (hold if not eating)  Patient needs basal insulin or else at risk for DKA.  -DC regimen, basal/bolus to be determined.  -Pt will f/u with endo Dr. Ley

## 2020-02-25 NOTE — ED PROVIDER NOTE - ATTENDING CONTRIBUTION TO CARE
65 y/o F with h/o ESRD (T/Th/Sat), CHF, COPD, prior CVA (no residual deficits), HLd, CAD (s/p stents), brought to ED by EMS for episode of AMS; found to be hypoglycemic; FSG 92 here but given mental status, 1 Amp  D50 given nd repeat FSG now >200; patient more awake, keeping eyes open; giving some PO trial.  Will continue to monitor, afebrile, otherwise stable.  Pending labs (cbc, cmp) and repeat FSG to trend glucose while in ED.  Will d/w her nephrologist or dialysis center if stable for discharge. 65 y/o F with h/o ESRD (T/Th/Sat), CHF, COPD, prior CVA (no residual deficits), HLD, CAD (s/p stents), brought to ED by EMS for episode of AMS; found to be hypoglycemic; FSG 92 here but given mental status, 1 Amp  D50 given and repeat FSG now >200; patient more awake, keeping eyes open; giving some PO trial.  Will continue to monitor, afebrile, otherwise stable.  Pending labs (cbc, cmp) and repeat FSG to trend glucose while in ED.  Will d/w her nephrologist or dialysis center if stable for discharge. 65 y/o F with h/o ESRD (T/Th/Sat), CHF, COPD, prior CVA (no residual deficits), HLD, CAD (s/p stents), brought to ED by EMS for episode of AMS; found to be hypoglycemic; FSG 92 here but given mental status, 1 Amp  D50 given and repeat FSG now >200; patient more awake, keeping eyes open; giving some PO trial.  Will continue to monitor, afebrile, otherwise stable.  Pending labs (cbc, cmp) and repeat FSG to trend glucose while in ED.  Will d/w her nephrologist or dialysis center if stable for discharge.    ED course: patient found to be hyperkalemic 6.4 not hemolyzed w/o ECG changes; case d/w nephrologist and scheduled for urgent HD this afternoon; admitted for HD, and further evaluation/management of hypoglycemia.  Endocrinology consult placed at request of admitting physician.

## 2020-02-25 NOTE — H&P ADULT - NSHPLABSRESULTS_GEN_ALL_CORE
10.4   8.32  )-----------( 222      ( 25 Feb 2020 11:22 )             34.3       02-25    134<L>  |  89<L>  |  44<H>  ----------------------------<  81  6.4<HH>   |  25  |  7.00<H>    Ca    9.3      25 Feb 2020 11:22    TPro  7.1  /  Alb  4.2  /  TBili  0.2  /  DBili  x   /  AST  22  /  ALT  11  /  AlkPhos  154<H>  02-25      POCT Blood Glucose.: 235 mg/dL (25 Feb 2020 12:23)    < from: Xray Chest 1 View- PORTABLE-Urgent (02.25.20 @ 12:36) >    IMPRESSION: Clear lungs.    < end of copied text >

## 2020-02-25 NOTE — CONSULT NOTE ADULT - ASSESSMENT
65 y/o F with h/o ESRD (T/Th/Sat), CHF, COPD, prior CVA (no residual deficits), HLd, CAD (s/p stents), brought to ED by EMS for episode of AMS in setting of severe hypoglycemia     1- ESRD  2- hyperkalemia  3- htn   4- chronic chf   5- shpt       hd consent obtained witnessed and placed in chart when seen   hd arrangements made for hyperkalemia  fluid removal with hd   trend glucose level   endocrinology consult   renvela one tab with meals   d/w er team when seen earlier.
63y/o F well known to diabetes team from frequent admissions.. Pt w/h/o uncontrolled TIDM (A1C 8.2 Jan 2020)  c/b neuropathy and retinopathy as well as CAD s/p multiple stents, CHF (EF 30%), TIA, PVD s/p b/l fem-pop bypass, ESRD> HD, brought to ED by EMS for episode of AMS and hypoglycemia.

## 2020-02-25 NOTE — H&P ADULT - HISTORY OF PRESENT ILLNESS
63 y/o F with h/o ESRD (T/Th/Sat), CHF, COPD, prior CVA (no residual deficits), HLd, CAD (s/p stents), brought to ED by EMS for episode of AMS; patient was reportedly in a store buying donuts (states that she was feeling like her blood sugar was low) and juice; but before she could eat became lightheaded and sat down; store employee called 911 and EMS arrived shortly after, found her to be very confused/mumbling and had a FSG of 20; patient ate donut and drank juice, and had rapid improvemetn in her mental status, now awake/talking, but on arrival to ED, per EMS, started closing eyes more and reports now feeling "sleepy."  Patient able to relate that she took humalin last night and did not eat breakfast as per her normal routine as she usually eats while at dialysis.  Was on her way to dialysis but did not get to center or have dialysis today.

## 2020-02-25 NOTE — ED PROVIDER NOTE - CONSTITUTIONAL, MLM
normal... On initial evaluation: awake but keeping eyes closed, knows she is in hospital, reports feeling sleepy, can open eyes on command

## 2020-02-25 NOTE — H&P ADULT - ASSESSMENT
64 f with    Diabetes Mellitus type 1/ Hypoglycemia  - endocrine evaluation  - BS control  - ADA diet    ESRD  - HD  - Nephrology evaluation Dr. Schmidt      CAD (coronary artery disease) s/p stents  - continue Rx    CHF (congestive heart failure) EF 40-45%  - cardiology evaluation dr. Biswas    COPD (chronic obstructive pulmonary disease)   - nebs    CVA (cerebral infarction) with no residual, 8 yrs ago, prior to heart surgery - ST memory loss  - supportive care     Gout past  - continue Rx    Hyperlipidemia   - stable    Depression   - stable    Further action as per clinical course     Pierce Viera MD pager [phone]

## 2020-02-26 ENCOUNTER — TRANSCRIPTION ENCOUNTER (OUTPATIENT)
Age: 65
End: 2020-02-26

## 2020-02-26 VITALS
OXYGEN SATURATION: 99 % | RESPIRATION RATE: 18 BRPM | DIASTOLIC BLOOD PRESSURE: 70 MMHG | HEART RATE: 85 BPM | TEMPERATURE: 97 F | SYSTOLIC BLOOD PRESSURE: 131 MMHG

## 2020-02-26 DIAGNOSIS — I15.1 HYPERTENSION SECONDARY TO OTHER RENAL DISORDERS: ICD-10-CM

## 2020-02-26 LAB
ANION GAP SERPL CALC-SCNC: 17 MMOL/L — SIGNIFICANT CHANGE UP (ref 5–17)
BUN SERPL-MCNC: 16 MG/DL — SIGNIFICANT CHANGE UP (ref 7–23)
CALCIUM SERPL-MCNC: 9.2 MG/DL — SIGNIFICANT CHANGE UP (ref 8.4–10.5)
CHLORIDE SERPL-SCNC: 94 MMOL/L — LOW (ref 96–108)
CO2 SERPL-SCNC: 26 MMOL/L — SIGNIFICANT CHANGE UP (ref 22–31)
CREAT SERPL-MCNC: 4.27 MG/DL — HIGH (ref 0.5–1.3)
GLUCOSE BLDC GLUCOMTR-MCNC: 142 MG/DL — HIGH (ref 70–99)
GLUCOSE BLDC GLUCOMTR-MCNC: 205 MG/DL — HIGH (ref 70–99)
GLUCOSE BLDC GLUCOMTR-MCNC: 257 MG/DL — HIGH (ref 70–99)
GLUCOSE BLDC GLUCOMTR-MCNC: 332 MG/DL — HIGH (ref 70–99)
GLUCOSE BLDC GLUCOMTR-MCNC: 97 MG/DL — SIGNIFICANT CHANGE UP (ref 70–99)
GLUCOSE SERPL-MCNC: 224 MG/DL — HIGH (ref 70–99)
HCT VFR BLD CALC: 32.8 % — LOW (ref 34.5–45)
HGB BLD-MCNC: 9.9 G/DL — LOW (ref 11.5–15.5)
MCHC RBC-ENTMCNC: 30.2 GM/DL — LOW (ref 32–36)
MCHC RBC-ENTMCNC: 32 PG — SIGNIFICANT CHANGE UP (ref 27–34)
MCV RBC AUTO: 106.1 FL — HIGH (ref 80–100)
NRBC # BLD: 0 /100 WBCS — SIGNIFICANT CHANGE UP (ref 0–0)
PLATELET # BLD AUTO: 195 K/UL — SIGNIFICANT CHANGE UP (ref 150–400)
POTASSIUM SERPL-MCNC: 5 MMOL/L — SIGNIFICANT CHANGE UP (ref 3.5–5.3)
POTASSIUM SERPL-SCNC: 5 MMOL/L — SIGNIFICANT CHANGE UP (ref 3.5–5.3)
RBC # BLD: 3.09 M/UL — LOW (ref 3.8–5.2)
RBC # FLD: 15.1 % — HIGH (ref 10.3–14.5)
SODIUM SERPL-SCNC: 137 MMOL/L — SIGNIFICANT CHANGE UP (ref 135–145)
WBC # BLD: 4.53 K/UL — SIGNIFICANT CHANGE UP (ref 3.8–10.5)
WBC # FLD AUTO: 4.53 K/UL — SIGNIFICANT CHANGE UP (ref 3.8–10.5)

## 2020-02-26 PROCEDURE — 82962 GLUCOSE BLOOD TEST: CPT

## 2020-02-26 PROCEDURE — 85027 COMPLETE CBC AUTOMATED: CPT

## 2020-02-26 PROCEDURE — 80048 BASIC METABOLIC PNL TOTAL CA: CPT

## 2020-02-26 PROCEDURE — 96374 THER/PROPH/DIAG INJ IV PUSH: CPT

## 2020-02-26 PROCEDURE — 93005 ELECTROCARDIOGRAM TRACING: CPT

## 2020-02-26 PROCEDURE — 99261: CPT

## 2020-02-26 PROCEDURE — 99285 EMERGENCY DEPT VISIT HI MDM: CPT | Mod: 25

## 2020-02-26 PROCEDURE — 96375 TX/PRO/DX INJ NEW DRUG ADDON: CPT

## 2020-02-26 PROCEDURE — 96376 TX/PRO/DX INJ SAME DRUG ADON: CPT

## 2020-02-26 PROCEDURE — 71045 X-RAY EXAM CHEST 1 VIEW: CPT

## 2020-02-26 PROCEDURE — 94640 AIRWAY INHALATION TREATMENT: CPT

## 2020-02-26 PROCEDURE — 80053 COMPREHEN METABOLIC PANEL: CPT

## 2020-02-26 PROCEDURE — 99232 SBSQ HOSP IP/OBS MODERATE 35: CPT

## 2020-02-26 PROCEDURE — G0257: CPT

## 2020-02-26 PROCEDURE — 99238 HOSP IP/OBS DSCHRG MGMT 30/<: CPT

## 2020-02-26 RX ORDER — INSULIN GLARGINE 100 [IU]/ML
13 INJECTION, SOLUTION SUBCUTANEOUS EVERY MORNING
Refills: 0 | Status: DISCONTINUED | OUTPATIENT
Start: 2020-02-26 | End: 2020-02-26

## 2020-02-26 RX ORDER — OXYCODONE AND ACETAMINOPHEN 5; 325 MG/1; MG/1
1 TABLET ORAL ONCE
Refills: 0 | Status: DISCONTINUED | OUTPATIENT
Start: 2020-02-26 | End: 2020-02-26

## 2020-02-26 RX ORDER — INSULIN GLARGINE 100 [IU]/ML
11 INJECTION, SOLUTION SUBCUTANEOUS AT BEDTIME
Refills: 0 | Status: DISCONTINUED | OUTPATIENT
Start: 2020-02-27 | End: 2020-02-26

## 2020-02-26 RX ORDER — INSULIN LISPRO 100/ML
VIAL (ML) SUBCUTANEOUS
Refills: 0 | Status: DISCONTINUED | OUTPATIENT
Start: 2020-02-26 | End: 2020-02-26

## 2020-02-26 RX ORDER — HUMAN INSULIN 100 [IU]/ML
5 INJECTION, SUSPENSION SUBCUTANEOUS ONCE
Refills: 0 | Status: DISCONTINUED | OUTPATIENT
Start: 2020-02-27 | End: 2020-02-26

## 2020-02-26 RX ORDER — INSULIN LISPRO 100/ML
2 VIAL (ML) SUBCUTANEOUS ONCE
Refills: 0 | Status: COMPLETED | OUTPATIENT
Start: 2020-02-26 | End: 2020-02-26

## 2020-02-26 RX ADMIN — INSULIN GLARGINE 13 UNIT(S): 100 INJECTION, SOLUTION SUBCUTANEOUS at 09:09

## 2020-02-26 RX ADMIN — SEVELAMER CARBONATE 800 MILLIGRAM(S): 2400 POWDER, FOR SUSPENSION ORAL at 12:57

## 2020-02-26 RX ADMIN — Medication 3 MILLILITER(S): at 01:02

## 2020-02-26 RX ADMIN — Medication 50 MILLIGRAM(S): at 06:17

## 2020-02-26 RX ADMIN — PANTOPRAZOLE SODIUM 40 MILLIGRAM(S): 20 TABLET, DELAYED RELEASE ORAL at 06:18

## 2020-02-26 RX ADMIN — Medication 0.5 MILLIGRAM(S): at 06:17

## 2020-02-26 RX ADMIN — OXYCODONE AND ACETAMINOPHEN 1 TABLET(S): 5; 325 TABLET ORAL at 16:00

## 2020-02-26 RX ADMIN — CLOPIDOGREL BISULFATE 75 MILLIGRAM(S): 75 TABLET, FILM COATED ORAL at 08:42

## 2020-02-26 RX ADMIN — Medication 3 MILLILITER(S): at 11:25

## 2020-02-26 RX ADMIN — SEVELAMER CARBONATE 800 MILLIGRAM(S): 2400 POWDER, FOR SUSPENSION ORAL at 08:42

## 2020-02-26 RX ADMIN — Medication 2 UNIT(S): at 07:05

## 2020-02-26 RX ADMIN — Medication 3 MILLILITER(S): at 06:17

## 2020-02-26 RX ADMIN — Medication 81 MILLIGRAM(S): at 08:41

## 2020-02-26 RX ADMIN — Medication 2: at 12:56

## 2020-02-26 RX ADMIN — Medication 3 UNIT(S): at 08:42

## 2020-02-26 RX ADMIN — Medication 3: at 08:42

## 2020-02-26 RX ADMIN — Medication 3 UNIT(S): at 12:57

## 2020-02-26 RX ADMIN — OXYCODONE AND ACETAMINOPHEN 1 TABLET(S): 5; 325 TABLET ORAL at 15:37

## 2020-02-26 NOTE — PROGRESS NOTE ADULT - PROBLEM SELECTOR PLAN 1
-test BG AC/HS/2AM  -Give NPH 5 units tomorrow AM to bridge back to HS Lantus  -Redose Lantus 11 units QHS starting 2/27  -c/w Humalog 3 units AC meals for now  -c/w Humalog low correction scale AC and low HS/2AM scale  Patient needs basal insulin or else at risk for DKA.  Discharge on basal/bolus. If going home today, will need to move Basaglar back by 2 units daily until at bedtime.   Basaglar 11 units daily  Humalog 3 units w/meals.   Pt will f/u with endo Dr. Ley.  >25 minutes spent on w/pt at bedside/unit which more than 50% of time was spent on counseling/coordination of care (hypoglycemia unawareness/DKA risk).

## 2020-02-26 NOTE — DISCHARGE NOTE PROVIDER - PROVIDER TOKENS
PROVIDER:[TOKEN:[[id number]],FOLLOWUP:[1 week],ESTABLISHEDPATIENT:[T]],PROVIDER:[TOKEN:[[id number]],FOLLOWUP:[1 week],ESTABLISHEDPATIENT:[T]],PROVIDER:[TOKEN:[[id number]],FOLLOWUP:[1 week],ESTABLISHEDPATIENT:[T]]

## 2020-02-26 NOTE — PROGRESS NOTE ADULT - ASSESSMENT
63y/o F well known to diabetes team from frequent admissions.. Pt w/h/o uncontrolled TIDM (A1C 8.2 Jan 2020)  c/b neuropathy and retinopathy as well as CAD s/p multiple stents, CHF (EF 30%), TIA, PVD s/p b/l fem-pop bypass, ESRD> HD, brought to ED by EMS for episode of AMS and hypoglycemia. Pt had basal insulin incorrectly held last night which caused rebound hyperglycemia. Because of pt's CKD she did not develop DKA this AM. Tolerating POs today. Pt w/hypoglycemia unawareness which predisposes her to episodes of severe hypoglycemia which she experienced. Per pt, normally on CGM to monitor glucose but did not have it on during this episode. BG goal (100-180mg/dl).

## 2020-02-26 NOTE — DISCHARGE NOTE PROVIDER - HOSPITAL COURSE
To be done. 65 y/o F with h/o ESRD (T/Th/Sat), CHF, COPD, prior CVA (no residual deficits), HLd, CAD (s/p stents), brought to ED by EMS for episode of AMS; patient was reportedly in a store buying donuts (states that she was feeling like her blood sugar was low) and juice; but before she could eat became lightheaded and sat down; store employee called 911 and EMS arrived shortly after, found her to be very confused/mumbling and had a FSG of 20; patient ate donut and drank juice, and had rapid improvement in her mental status, now awake/talking, but on arrival to ED, per EMS, started closing eyes more and reports now feeling "sleepy."  Patient able to relate that she took humalin last night and did not eat breakfast as per her normal routine as she usually eats while at dialysis.  Was on her way to dialysis but did not get to center or have dialysis today. cardiology consulted for management of cardiomyopathy.  Pt was seen by Endocrine and Cardiology.  She was admitted to telemetry.  Her home dose of basaglar was decreased to 11 units daily at bedtime.  Her premeals she norm continue 3 units prior to meals. Blood sugars has since improved. Her cardiologist spoke to her re: an ICD placement.  Pt still refuse.  Pt was discharged to home today. She is hemodynamically stable.  Pt' and her family verbalize understanding of discharge plan and medication reconciliation.  She will follow up with Dr. Biswas, Cardiology, and Dr. Ley, Endocrine in 3-5 days.

## 2020-02-26 NOTE — PROGRESS NOTE ADULT - ASSESSMENT
64 f with    Diabetes Mellitus type 1/ Hypoglycemia  - endocrine evaluation noted   - BS control  - ADA diet    ESRD  - HD  - Nephrology evaluation Dr. Schmidt      CAD (coronary artery disease) s/p stents  - continue Rx    CHF (congestive heart failure) EF 40-45%  - cardiology evaluation dr. Biswas    COPD (chronic obstructive pulmonary disease)   - nebs    CVA (cerebral infarction) with no residual, 8 yrs ago, prior to heart surgery - ST memory loss  - supportive care     Gout past  - continue Rx    Hyperlipidemia   - stable    Depression   - stable    DC home. Follow with endocrine/ Nephrology / Cardiology/ Pulmonary in 3-4 days     Pierce Viera MD pager [phone]

## 2020-02-26 NOTE — PROGRESS NOTE ADULT - SUBJECTIVE AND OBJECTIVE BOX
Diabetes Follow up note:    Chief complaint: f/u T1DM     Interval Hx: Glucose values tightly controlled in 80-90 range last night. Pt not given any Lantus. Glucose 300s this AM, given stat Humalog and Lantus. Pt seen at bedside. Reports feeling better and asking if she can go home. Does not know why her sugar went so low and reports this is not happening frequently. Reports good PO intake today. Last HD session yesterday.     Review of Systems:  General: no complaints.   GI: Tolerating POs. Denies N/V/D/Abd pain  CV: Denies CP/SOB  ENDO: No S&Sx of hypoglycemia  MEDS:  atorvastatin 20 milliGRAM(s) Oral at bedtime    insulin glargine Injectable (LANTUS) 13 Unit(s) SubCutaneous every morning  insulin lispro (HumaLOG) corrective regimen sliding scale   SubCutaneous <User Schedule>  insulin lispro (HumaLOG) corrective regimen sliding scale   SubCutaneous three times a day before meals  insulin lispro (HumaLOG) corrective regimen sliding scale   SubCutaneous at bedtime  insulin lispro Injectable (HumaLOG) 3 Unit(s) SubCutaneous three times a day before meals      Allergies    No Known Allergies      PE:  General: Female sitting in chair. NAD>   Vital Signs Last 24 Hrs  T(C): 36.3 (26 Feb 2020 12:12), Max: 36.9 (25 Feb 2020 20:48)  T(F): 97.4 (26 Feb 2020 12:12), Max: 98.5 (25 Feb 2020 20:48)  HR: 85 (26 Feb 2020 12:12) (67 - 92)  BP: 131/70 (26 Feb 2020 12:12) (120/62 - 158/76)  BP(mean): --  RR: 18 (26 Feb 2020 12:12) (16 - 18)  SpO2: 99% (26 Feb 2020 12:12) (92% - 99%)  Abd: Soft, NT,ND,   Extremities: Warm. Trace edema.   Neuro: A&O X3    LABS:  POCT Blood Glucose.: 257 mg/dL (02-26-20 @ 08:35)  POCT Blood Glucose.: 142 mg/dL (02-26-20 @ 03:06)  POCT Blood Glucose.: 97 mg/dL (02-26-20 @ 00:55)  POCT Blood Glucose.: 87 mg/dL (02-25-20 @ 21:27)  POCT Blood Glucose.: 81 mg/dL (02-25-20 @ 20:50)  POCT Blood Glucose.: 114 mg/dL (02-25-20 @ 18:02)  POCT Blood Glucose.: 235 mg/dL (02-25-20 @ 12:23)  POCT Blood Glucose.: 243 mg/dL (02-25-20 @ 11:53)  POCT Blood Glucose.: 294 mg/dL (02-25-20 @ 11:04)  POCT Blood Glucose.: 92 mg/dL (02-25-20 @ 10:52)                            9.9    4.53  )-----------( 195      ( 26 Feb 2020 06:39 )             32.8       02-26    137  |  94<L>  |  16  ----------------------------<  224<H>  5.0   |  26  |  4.27<H>    Ca    9.2      26 Feb 2020 06:37    TPro  7.1  /  Alb  4.2  /  TBili  0.2  /  DBili  x   /  AST  22  /  ALT  11  /  AlkPhos  154<H>  02-25        Hemoglobin A1C, Whole Blood: 8.2 % <H> [4.0 - 5.6] (01-18-20 @ 09:41)          Contact number: isabel [phone] or [phone]

## 2020-02-26 NOTE — DISCHARGE NOTE PROVIDER - CARE PROVIDER_API CALL
Pina Ley)  EndocrinologyMetabDiabetes  [address], [place] [address]  Phone: [phone]  Fax: [phone]  Established Patient  Follow Up Time: 1 week    Tee Biswas)  Cardiovascular Disease; Internal Medicine  [address] [place] [address]  Phone: [phone]  Fax: [phone]  Established Patient  Follow Up Time: 1 week    Arsalan Castro)  Internal Medicine  [address] [place] [address]  Phone: [phone]  Fax: [phone]  Established Patient  Follow Up Time: 1 week

## 2020-02-26 NOTE — DISCHARGE NOTE PROVIDER - NSDCCPCAREPLAN_GEN_ALL_CORE_FT
PRINCIPAL DISCHARGE DIAGNOSIS  Diagnosis: Hypoglycemia  Assessment and Plan of Treatment: Medication has been adjusted.   Follow up with Endocrinologist next week.      SECONDARY DISCHARGE DIAGNOSES  Diagnosis: Hyperkalemia  Assessment and Plan of Treatment: Resolved.   Follow up with PMD next week.   Continue present medication regimen. PRINCIPAL DISCHARGE DIAGNOSIS  Diagnosis: Hypoglycemia  Assessment and Plan of Treatment: Medication has been adjusted.   Follow up with Endocrinologist next week.  Continue Basaglar 11 units daily at bedtime.   Continue Novolog 3-4 units with meals based on the fingerstick.      SECONDARY DISCHARGE DIAGNOSES  Diagnosis: Hyperkalemia  Assessment and Plan of Treatment: Resolved.   Follow up with PMD next week.   Continue present medication regimen.

## 2020-02-26 NOTE — CONSULT NOTE ADULT - SUBJECTIVE AND OBJECTIVE BOX
CHIEF COMPLAINT: hypoglycemia    HISTORY OF PRESENT ILLNESS:  63 y/o F with h/o ESRD (T/Th/Sat), CHF, COPD, prior CVA (no residual deficits), HLd, CAD (s/p stents), brought to ED by EMS for episode of AMS; patient was reportedly in a store buying donuts (states that she was feeling like her blood sugar was low) and juice; but before she could eat became lightheaded and sat down; store employee called 911 and EMS arrived shortly after, found her to be very confused/mumbling and had a FSG of 20; patient ate donut and drank juice, and had rapid improvement in her mental status, now awake/talking, but on arrival to ED, per EMS, started closing eyes more and reports now feeling "sleepy."  Patient able to relate that she took humalin last night and did not eat breakfast as per her normal routine as she usually eats while at dialysis.  Was on her way to dialysis but did not get to center or have dialysis today. cardiology consulted for management of cardiomyopathy    Allergies  No Known Allergies        MEDICATIONS:  aspirin enteric coated 81 milliGRAM(s) Oral daily  clopidogrel Tablet 75 milliGRAM(s) Oral daily  heparin  Injectable 5000 Unit(s) SubCutaneous every 12 hours  metoprolol succinate ER 50 milliGRAM(s) Oral daily  albuterol/ipratropium for Nebulization 3 milliLiter(s) Nebulizer every 6 hours  buDESOnide    Inhalation Suspension 0.5 milliGRAM(s) Inhalation two times a day  acetaminophen   Tablet .. 650 milliGRAM(s) Oral every 6 hours PRN  pantoprazole    Tablet 40 milliGRAM(s) Oral before breakfast  atorvastatin 20 milliGRAM(s) Oral at bedtime  dextrose 40% Gel 15 Gram(s) Oral once PRN  dextrose 50% Injectable 12.5 Gram(s) IV Push once  dextrose 50% Injectable 25 Gram(s) IV Push once  dextrose 50% Injectable 25 Gram(s) IV Push once  glucagon  Injectable 1 milliGRAM(s) IntraMuscular once PRN  insulin glargine Injectable (LANTUS) 13 Unit(s) SubCutaneous at bedtime  insulin lispro (HumaLOG) corrective regimen sliding scale   SubCutaneous three times a day before meals  insulin lispro (HumaLOG) corrective regimen sliding scale   SubCutaneous at bedtime  insulin lispro (HumaLOG) corrective regimen sliding scale   SubCutaneous <User Schedule>  insulin lispro Injectable (HumaLOG) 3 Unit(s) SubCutaneous three times a day before meals    dextrose 5%. 1000 milliLiter(s) IV Continuous <Continuous>      PAST MEDICAL & SURGICAL HISTORY:  COPD (chronic obstructive pulmonary disease)  Localized enlarged lymph nodes  CHF (congestive heart failure): EF 40-45%  Subclavian vein stenosis, left: s/p stent  DKA, type 1: 1/2015  ACS (acute coronary syndrome): 1/2015 - cath revealed 100% ostial stenosis not amenable to PCI - medical management  TIA (transient ischemic attack): x 2 - 8-9 years ago prior to ASD/VSD repair  CAD (coronary artery disease): s/p stents  Gout: past  CVA (cerebral infarction): with no residual, 8 yrs ago, prior to heart surgery - ST memory loss  Peripheral vascular disease: occluded left fem-pop bypass 5/2015  Diabetes mellitus type 1: Insulin Dependent -  ESRD (end stage renal disease): dialysis  M, tue, thursday, saturday  Hyperlipidemia  Status post device closure of ASD: &quot;clamshell&quot;  History of cardiac catheterization: 1/2015 - no intervention  S/P femoral-popliteal bypass surgery: L and R in 2013 with graft; 5/2015 CFA angioplasty left and ileofemoral endarterectomywith vein patch angioplasty of left fem-pop bypass graft  Multiple vascular surgery both leg, left fempop bypass revision 11/2015  AV (arteriovenous fistula): Left AV graft; revision with stent placement 2-3 years ago  S/P cholecystectomy      FAMILY HISTORY:  Family history of smoking  Family history of hypertension  Family history of cancer (Sibling)      SOCIAL HISTORY:    former smoker. indep in adl      REVIEW OF SYSTEMS:  See HPI, otherwise complete 10 point review of systems negative    [ ] All others negative	  [ ] Unable to obtain    PHYSICAL EXAM:  T(C): 36.6 (02-26-20 @ 04:26), Max: 36.9 (02-25-20 @ 20:48)  HR: 81 (02-26-20 @ 04:26) (67 - 92)  BP: 145/60 (02-26-20 @ 04:26) (120/62 - 158/76)  RR: 18 (02-26-20 @ 04:26) (16 - 18)  SpO2: 95% (02-26-20 @ 04:26) (92% - 99%)  Wt(kg): --  I&O's Summary    25 Feb 2020 07:01  -  26 Feb 2020 07:00  --------------------------------------------------------  IN: 170 mL / OUT: 0 mL / NET: 170 mL        Appearance: No Acute Distress	  HEENT:  Normal oral mucosa, PERRL, EOMI	  Cardiovascular: Normal S1 S2, No JVD, 2/6 heraclio  Respiratory: Lungs clear to auscultation bilaterally  Gastrointestinal:  Soft, Non-tender, + BS	  Skin: No rashes, No ecchymoses, No cyanosis	  Neurologic: Non-focal  Extremities: No clubbing, cyanosis or edema  Vascular: Peripheral pulses not palpable  Psychiatry: A & O x 3, Mood & affect appropriate    Laboratory Data:	 	    CBC Full  -  ( 25 Feb 2020 11:22 )  WBC Count : 8.32 K/uL  Hemoglobin : 10.4 g/dL  Hematocrit : 34.3 %  Platelet Count - Automated : 222 K/uL  Mean Cell Volume : 105.5 fl  Mean Cell Hemoglobin : 32.0 pg  Mean Cell Hemoglobin Concentration : 30.3 gm/dL  Auto Neutrophil # : 6.87 K/uL  Auto Lymphocyte # : 0.51 K/uL  Auto Monocyte # : 0.72 K/uL  Auto Eosinophil # : 0.22 K/uL  Auto Basophil # : 0.00 K/uL  Auto Neutrophil % : 82.6 %  Auto Lymphocyte % : 6.1 %  Auto Monocyte % : 8.7 %  Auto Eosinophil % : 2.6 %  Auto Basophil % : 0.0 %    02-26    137  |  94<L>  |  16  ----------------------------<  224<H>  5.0   |  26  |  4.27<H>  02-25    134<L>  |  89<L>  |  44<H>  ----------------------------<  81  6.4<HH>   |  25  |  7.00<H>    Ca    9.2      26 Feb 2020 06:37  Ca    9.3      25 Feb 2020 11:22    TPro  7.1  /  Alb  4.2  /  TBili  0.2  /  DBili  x   /  AST  22  /  ALT  11  /  AlkPhos  154<H>  02-25          ECG:  	nsr nonspecific st changes      Assessment:  -hypoglycemia  -electrolyte abnormalities  -hx right lower lobe opacity  -NSVT  -ischemic cardiomyopathy c/b mod to severe LV dysfunction, cad s/p multiple stents  -esrd on hd  -PAD s/p prior intervention       Recs:  -optimization of volume status and electrolyte abnormalities with hd. renal appreciated. awaiting avf ultrasound  -known extensive CAD and ICM. s/p [facility] 2/20/2019 --> severe and diffuse instent restenosis along RCA --> unable to stent due to multiple layers of stenting and prior brachytherapy. denies any true ischemic sx at present  -c/w beta blockers for nsvt/pat/cad (as above). c/w toprol 50mg. also previously on hydral and lisinopril. would resume lisinopril once hyperkalemia resolves and uptitrate GDMT as tolerates. patient previously declined ICD for primary prevention, will cont to address  -hx of prolonged qtc. avoid qtc prolonging meds  -s/p TTE 2019: mod-severe MR, pseudo AS (low flow-low gradient), mod-severe LV dysfunction --> previous CTS consult with dr hebert appreciated. plan is for medical management given surgical risk and patient preference  -c/w asa, plavix, statin for ischemic cardiomyopathy/CAD/PAD  -due for repeat ct chest to f/u on rll opacity    Greater than 50 minutes spent on total encounter; more than 50% of the visit was spent counseling and/or coordinating care by the attending physician.   	  Julián Biswas MD   Cardiovascular Diseases  [phone]

## 2020-02-26 NOTE — PROGRESS NOTE ADULT - SUBJECTIVE AND OBJECTIVE BOX
Patient is a 64y old  Female who presents with a chief complaint of hypoglycemia (26 Feb 2020 12:52)      SUBJECTIVE / OVERNIGHT EVENTS: feels better. Wants to go home.  at bedside.  Review of Systems  chest pain no  palpitations no  sob no  nausea no  headache no    MEDICATIONS  (STANDING):  albuterol/ipratropium for Nebulization 3 milliLiter(s) Nebulizer every 6 hours  aspirin enteric coated 81 milliGRAM(s) Oral daily  atorvastatin 20 milliGRAM(s) Oral at bedtime  buDESOnide    Inhalation Suspension 0.5 milliGRAM(s) Inhalation two times a day  clopidogrel Tablet 75 milliGRAM(s) Oral daily  dextrose 5%. 1000 milliLiter(s) (50 mL/Hr) IV Continuous <Continuous>  dextrose 50% Injectable 12.5 Gram(s) IV Push once  dextrose 50% Injectable 25 Gram(s) IV Push once  dextrose 50% Injectable 25 Gram(s) IV Push once  heparin  Injectable 5000 Unit(s) SubCutaneous every 12 hours  insulin lispro (HumaLOG) corrective regimen sliding scale   SubCutaneous <User Schedule>  insulin lispro (HumaLOG) corrective regimen sliding scale   SubCutaneous three times a day before meals  insulin lispro (HumaLOG) corrective regimen sliding scale   SubCutaneous at bedtime  insulin lispro Injectable (HumaLOG) 3 Unit(s) SubCutaneous three times a day before meals  metoprolol succinate ER 50 milliGRAM(s) Oral daily  pantoprazole    Tablet 40 milliGRAM(s) Oral before breakfast  sevelamer carbonate 800 milliGRAM(s) Oral three times a day with meals    MEDICATIONS  (PRN):  acetaminophen   Tablet .. 650 milliGRAM(s) Oral every 6 hours PRN Temp greater or equal to 38.5C (101.3F), Mild Pain (1 - 3)  dextrose 40% Gel 15 Gram(s) Oral once PRN Blood Glucose LESS THAN 70 milliGRAM(s)/deciLiter  glucagon  Injectable 1 milliGRAM(s) IntraMuscular once PRN Glucose <70 milliGRAM(s)/deciLiter      Vital Signs Last 24 Hrs  T(C): 36.3 (26 Feb 2020 12:12), Max: 36.9 (25 Feb 2020 20:48)  T(F): 97.4 (26 Feb 2020 12:12), Max: 98.5 (25 Feb 2020 20:48)  HR: 85 (26 Feb 2020 12:12) (74 - 92)  BP: 131/70 (26 Feb 2020 12:12) (120/62 - 158/76)  BP(mean): --  RR: 18 (26 Feb 2020 12:12) (16 - 18)  SpO2: 99% (26 Feb 2020 12:12) (95% - 99%)    PHYSICAL EXAM:  GENERAL: NAD  HEAD:  Atraumatic, Normocephalic  EYES: EOMI, PERRLA, conjunctiva and sclera clear  NECK: Supple, No JVD  CHEST/LUNG: Clear to auscultation bilaterally; No wheeze  HEART: Regular rate and rhythm; No murmurs, rubs, or gallops  ABDOMEN: Soft, Nontender, Nondistended; Bowel sounds present  EXTREMITIES:  2+ Peripheral Pulses, No clubbing, cyanosis, or edema   PSYCH: Anxious  NEUROLOGY: non-focal  SKIN: No rashes or lesions    LABS:                        9.9    4.53  )-----------( 195      ( 26 Feb 2020 06:39 )             32.8     02-26    137  |  94<L>  |  16  ----------------------------<  224<H>  5.0   |  26  |  4.27<H>    Ca    9.2      26 Feb 2020 06:37    TPro  7.1  /  Alb  4.2  /  TBili  0.2  /  DBili  x   /  AST  22  /  ALT  11  /  AlkPhos  154<H>  02-25                RADIOLOGY & ADDITIONAL TESTS:    Imaging Personally Reviewed:    Consultant(s) Notes Reviewed:      Care Discussed with Consultants/Other Providers:

## 2020-02-26 NOTE — DISCHARGE NOTE NURSING/CASE MANAGEMENT/SOCIAL WORK - PATIENT PORTAL LINK FT
You can access the FollowMyHealth Patient Portal offered by [facility] by registering at the following website: [email or web address]. By joining [employer]’s FollowMyHealth portal, you will also be able to view your health information using other applications (apps) compatible with our system.

## 2020-02-26 NOTE — DISCHARGE NOTE PROVIDER - NSDCMRMEDTOKEN_GEN_ALL_CORE_FT
acetaminophen 325 mg oral tablet: 2 tab(s) orally every 6 hours, As Needed  aspirin 81 mg oral delayed release tablet: 1 tab(s) orally once a day  atorvastatin 20 mg oral tablet: 1 tab(s) orally once a day  Basaglar KwikPen 100 units/mL subcutaneous solution: 13 unit(s) subcutaneous once a day (at bedtime)  budesonide 0.5 mg/2 mL inhalation suspension: 2 milliliter(s) inhaled 2 times a day  clopidogrel 75 mg oral tablet: 1 tab(s) orally once a day  ipratropium-albuterol 0.5 mg-2.5 mg/3 mLinhalation solution: 3 milliliter(s) inhaled every 6 hours  NovoLOG FlexTouch 100 units/mL subcutaneous solution: 3-4 units based upon PO intake and fingersticks  pantoprazole 40 mg oral delayed release tablet: 1 tab(s) orally once a day  sevelamer carbonate 800 mg oral tablet: 1 tab(s) orally 3 times a day (with meals)  Toprol-XL 50 mg oral tablet, extended release: 1 tab(s) orally once a day (at bedtime)

## 2020-03-11 NOTE — ED PROVIDER NOTE - OBJECTIVE STATEMENT
64 Female hx CAD (Cath Feb '19 showing 99% RCA, 100% RPLS, 100% Cx) s/p multiple stents/brachytherapy, ESRD (on HD M/T/T/Sa), DM1 c/b neuropathy and retinopathy, CHF, mod MR, severe AS, RHF, TIA, severe PVD s/p b/l fempop bypass, left subclavian vein stenosis s/p stent, COPD not on O2    Last dialysis full session on saturday after which she felt more bloated then usual. Amount of swelling increased today to the point her legs were "going to explode". No new pain. No new SOB. No new CP.   No N/V/D. No f/c.     Nephrologist: Dr. Almonte    meds: duloxetine, metoprolol succ, hydralazine, furosemide, oxycodone, clopidogrel, lisinopril, atorvastatin, acetaminophen, leila aspirin, hydralazine, No 64 Female hx CAD (Cath Feb '19 showing 99% RCA, 100% RPLS, 100% Cx) s/p multiple stents/brachytherapy, ESRD (on HD M/T/T/Sa), DM1 c/b neuropathy and retinopathy, CHF, mod MR, severe AS, RHF, TIA, severe PVD s/p b/l fempop bypass, left subclavian vein stenosis s/p stent, COPD not on O2 pw cc of LE swelling    Last dialysis full session on saturday after which she felt more bloated then usual. Amount of swelling increased today to the point her legs were "going to explode". No new pain. No new SOB. No new CP.   No N/V/D. No f/c.     Nephrologist: Dr. Almonte    meds: duloxetine, metoprolol succ, hydralazine, furosemide, oxycodone, clopidogrel, lisinopril, atorvastatin, acetaminophen, leila aspirin, hydralazine,

## 2020-03-12 NOTE — PROGRESS NOTE ADULT - PROVIDER SPECIALTY LIST ADULT
Infectious Disease Please follow are flow chart for corona virus testing, I do not believe that she qualifies for testing pr.  Please also tell her that the testing supplies are in shortage at this time.  If the [facility] loosens that restrictions for testing then she could be tested but coronavirus in her condition and medications would cause pretty significant pneumonia, it seems that she just has a cough, the clinical suspicion for corona virus is very low in her case.  She should continue with common sense precautions, wash hands frequently, stay away from people were sick etc

## 2020-03-18 ENCOUNTER — EMERGENCY (EMERGENCY)
Facility: HOSPITAL | Age: 65
LOS: 1 days | Discharge: HOME | End: 2020-03-18
Attending: EMERGENCY MEDICINE
Payer: MEDICARE

## 2020-03-18 VITALS
RESPIRATION RATE: 16 BRPM | SYSTOLIC BLOOD PRESSURE: 115 MMHG | HEART RATE: 77 BPM | DIASTOLIC BLOOD PRESSURE: 74 MMHG | OXYGEN SATURATION: 96 % | TEMPERATURE: 98 F

## 2020-03-18 VITALS
DIASTOLIC BLOOD PRESSURE: 80 MMHG | RESPIRATION RATE: 20 BRPM | TEMPERATURE: 98 F | HEART RATE: 80 BPM | OXYGEN SATURATION: 98 % | SYSTOLIC BLOOD PRESSURE: 138 MMHG

## 2020-03-18 DIAGNOSIS — Z98.89 OTHER SPECIFIED POSTPROCEDURAL STATES: Chronic | ICD-10-CM

## 2020-03-18 LAB
ALBUMIN SERPL ELPH-MCNC: 4.4 G/DL — SIGNIFICANT CHANGE UP (ref 3.3–5)
ALP SERPL-CCNC: 176 U/L — HIGH (ref 40–120)
ALT FLD-CCNC: 15 U/L — SIGNIFICANT CHANGE UP (ref 10–45)
ANION GAP SERPL CALC-SCNC: 20 MMOL/L — HIGH (ref 5–17)
APTT BLD: 33.4 SEC — SIGNIFICANT CHANGE UP (ref 27.5–36.3)
AST SERPL-CCNC: 27 U/L — SIGNIFICANT CHANGE UP (ref 10–40)
BASE EXCESS BLDV CALC-SCNC: 4.9 MMOL/L — HIGH (ref -2–2)
BASOPHILS # BLD AUTO: 0 K/UL — SIGNIFICANT CHANGE UP (ref 0–0.2)
BASOPHILS NFR BLD AUTO: 0 % — SIGNIFICANT CHANGE UP (ref 0–2)
BILIRUB SERPL-MCNC: 0.5 MG/DL — SIGNIFICANT CHANGE UP (ref 0.2–1.2)
BUN SERPL-MCNC: 30 MG/DL — HIGH (ref 7–23)
CA-I SERPL-SCNC: 1.13 MMOL/L — SIGNIFICANT CHANGE UP (ref 1.12–1.3)
CALCIUM SERPL-MCNC: 10 MG/DL — SIGNIFICANT CHANGE UP (ref 8.4–10.5)
CHLORIDE BLDV-SCNC: 103 MMOL/L — SIGNIFICANT CHANGE UP (ref 96–108)
CHLORIDE SERPL-SCNC: 96 MMOL/L — SIGNIFICANT CHANGE UP (ref 96–108)
CO2 BLDV-SCNC: 33 MMOL/L — HIGH (ref 22–30)
CO2 SERPL-SCNC: 25 MMOL/L — SIGNIFICANT CHANGE UP (ref 22–31)
CREAT SERPL-MCNC: 5.14 MG/DL — HIGH (ref 0.5–1.3)
EOSINOPHIL # BLD AUTO: 0 K/UL — SIGNIFICANT CHANGE UP (ref 0–0.5)
EOSINOPHIL NFR BLD AUTO: 0 % — SIGNIFICANT CHANGE UP (ref 0–6)
GAS PNL BLDV: 134 MMOL/L — LOW (ref 135–145)
GAS PNL BLDV: SIGNIFICANT CHANGE UP
GAS PNL BLDV: SIGNIFICANT CHANGE UP
GIANT PLATELETS BLD QL SMEAR: PRESENT — SIGNIFICANT CHANGE UP
GLUCOSE BLDV-MCNC: 250 MG/DL — HIGH (ref 70–99)
GLUCOSE SERPL-MCNC: 255 MG/DL — HIGH (ref 70–99)
HCO3 BLDV-SCNC: 31 MMOL/L — HIGH (ref 21–29)
HCT VFR BLD CALC: 33.8 % — LOW (ref 34.5–45)
HCT VFR BLDA CALC: 35 % — LOW (ref 39–50)
HGB BLD CALC-MCNC: 11.4 G/DL — LOW (ref 11.5–15.5)
HGB BLD-MCNC: 10 G/DL — LOW (ref 11.5–15.5)
HOROWITZ INDEX BLDV+IHG-RTO: SIGNIFICANT CHANGE UP
INR BLD: 1.05 RATIO — SIGNIFICANT CHANGE UP (ref 0.88–1.16)
LACTATE BLDV-MCNC: 2.6 MMOL/L — HIGH (ref 0.7–2)
LIDOCAIN IGE QN: 21 U/L — SIGNIFICANT CHANGE UP (ref 7–60)
LYMPHOCYTES # BLD AUTO: 0.56 K/UL — LOW (ref 1–3.3)
LYMPHOCYTES # BLD AUTO: 9.5 % — LOW (ref 13–44)
MACROCYTES BLD QL: SLIGHT — SIGNIFICANT CHANGE UP
MAGNESIUM SERPL-MCNC: 2.4 MG/DL — SIGNIFICANT CHANGE UP (ref 1.6–2.6)
MANUAL SMEAR VERIFICATION: SIGNIFICANT CHANGE UP
MCHC RBC-ENTMCNC: 29.6 GM/DL — LOW (ref 32–36)
MCHC RBC-ENTMCNC: 32.4 PG — SIGNIFICANT CHANGE UP (ref 27–34)
MCV RBC AUTO: 109.4 FL — HIGH (ref 80–100)
MONOCYTES # BLD AUTO: 0.56 K/UL — SIGNIFICANT CHANGE UP (ref 0–0.9)
MONOCYTES NFR BLD AUTO: 9.6 % — SIGNIFICANT CHANGE UP (ref 2–14)
NEUTROPHILS # BLD AUTO: 4.75 K/UL — SIGNIFICANT CHANGE UP (ref 1.8–7.4)
NEUTROPHILS NFR BLD AUTO: 80.9 % — HIGH (ref 43–77)
PCO2 BLDV: 59 MMHG — HIGH (ref 35–50)
PH BLDV: 7.34 — LOW (ref 7.35–7.45)
PLAT MORPH BLD: NORMAL — SIGNIFICANT CHANGE UP
PLATELET # BLD AUTO: 195 K/UL — SIGNIFICANT CHANGE UP (ref 150–400)
PO2 BLDV: 25 MMHG — SIGNIFICANT CHANGE UP (ref 25–45)
POTASSIUM BLDV-SCNC: 5.4 MMOL/L — HIGH (ref 3.5–5.3)
POTASSIUM SERPL-MCNC: 5.8 MMOL/L — HIGH (ref 3.5–5.3)
POTASSIUM SERPL-SCNC: 5.8 MMOL/L — HIGH (ref 3.5–5.3)
PROT SERPL-MCNC: 7.1 G/DL — SIGNIFICANT CHANGE UP (ref 6–8.3)
PROTHROM AB SERPL-ACNC: 12.1 SEC — SIGNIFICANT CHANGE UP (ref 10–12.9)
RBC # BLD: 3.09 M/UL — LOW (ref 3.8–5.2)
RBC # FLD: 14.9 % — HIGH (ref 10.3–14.5)
RBC BLD AUTO: SIGNIFICANT CHANGE UP
SAO2 % BLDV: 32 % — LOW (ref 67–88)
SODIUM SERPL-SCNC: 141 MMOL/L — SIGNIFICANT CHANGE UP (ref 135–145)
TROPONIN T, HIGH SENSITIVITY RESULT: 266 NG/L — HIGH (ref 0–51)
TROPONIN T, HIGH SENSITIVITY RESULT: 282 NG/L — HIGH (ref 0–51)
WBC # BLD: 5.87 K/UL — SIGNIFICANT CHANGE UP (ref 3.8–10.5)
WBC # FLD AUTO: 5.87 K/UL — SIGNIFICANT CHANGE UP (ref 3.8–10.5)

## 2020-03-18 PROCEDURE — 71046 X-RAY EXAM CHEST 2 VIEWS: CPT

## 2020-03-18 PROCEDURE — 80053 COMPREHEN METABOLIC PANEL: CPT

## 2020-03-18 PROCEDURE — 82435 ASSAY OF BLOOD CHLORIDE: CPT

## 2020-03-18 PROCEDURE — 82330 ASSAY OF CALCIUM: CPT

## 2020-03-18 PROCEDURE — 83605 ASSAY OF LACTIC ACID: CPT

## 2020-03-18 PROCEDURE — 93005 ELECTROCARDIOGRAM TRACING: CPT

## 2020-03-18 PROCEDURE — 82550 ASSAY OF CK (CPK): CPT

## 2020-03-18 PROCEDURE — 74177 CT ABD & PELVIS W/CONTRAST: CPT

## 2020-03-18 PROCEDURE — 85730 THROMBOPLASTIN TIME PARTIAL: CPT

## 2020-03-18 PROCEDURE — 82947 ASSAY GLUCOSE BLOOD QUANT: CPT

## 2020-03-18 PROCEDURE — 99284 EMERGENCY DEPT VISIT MOD MDM: CPT | Mod: 25

## 2020-03-18 PROCEDURE — 85610 PROTHROMBIN TIME: CPT

## 2020-03-18 PROCEDURE — 74177 CT ABD & PELVIS W/CONTRAST: CPT | Mod: 26

## 2020-03-18 PROCEDURE — 84295 ASSAY OF SERUM SODIUM: CPT

## 2020-03-18 PROCEDURE — 84132 ASSAY OF SERUM POTASSIUM: CPT

## 2020-03-18 PROCEDURE — 84484 ASSAY OF TROPONIN QUANT: CPT

## 2020-03-18 PROCEDURE — 82565 ASSAY OF CREATININE: CPT

## 2020-03-18 PROCEDURE — 85014 HEMATOCRIT: CPT

## 2020-03-18 PROCEDURE — 82962 GLUCOSE BLOOD TEST: CPT

## 2020-03-18 PROCEDURE — 99285 EMERGENCY DEPT VISIT HI MDM: CPT | Mod: GC

## 2020-03-18 PROCEDURE — 71046 X-RAY EXAM CHEST 2 VIEWS: CPT | Mod: 26

## 2020-03-18 PROCEDURE — 85027 COMPLETE CBC AUTOMATED: CPT

## 2020-03-18 PROCEDURE — 82803 BLOOD GASES ANY COMBINATION: CPT

## 2020-03-18 PROCEDURE — 83690 ASSAY OF LIPASE: CPT

## 2020-03-18 PROCEDURE — 83735 ASSAY OF MAGNESIUM: CPT

## 2020-03-18 NOTE — ED PROVIDER NOTE - SHIFT CHANGE DETAILS
Gertrude Roman MD - Attending Physician: Pt w/ ESRD, here with Abd Pain, hyperglycemia. Labs nonactionable. Awaiting CT abd for dispo planning

## 2020-03-18 NOTE — ED PROVIDER NOTE - OBJECTIVE STATEMENT
64 year old female hx of ***** p/w hyperglycemia at home, general weakneess, and pain. 64 year old female hx of ESRD (T/Th/Sat), CHF, COPD, prior CVA (no residual deficits), HLD, CAD (s/p stents), DM on Insulin p/w hyperglycemia at home, general weakness, and diffuse pain in all extremities beginning this AM. Patient states she did not take her long acting insulin last night, however did take her Humalog this AM. She denies fever, nausea, vomiting. No headache or dizziness. No chjest apin, pressure, or shortness of breath. No cough, nasal congestion. Mild diffuse abd discomfort, not postprandial. No dysuria, hematuria. No diarrhea, constipation. patient only makes minimal urine. She reports having diaylsis yesterday, and with an appt today at 6pm.

## 2020-03-18 NOTE — ED PROVIDER NOTE - CLINICAL SUMMARY MEDICAL DECISION MAKING FREE TEXT BOX
patient c/o gen weakness, pain with R sided abd pain   Vitals afebriele, WNL - fingerstick 290s on arrival  Physical exam with R abd TTP  will eval with labs, xr, ct w/o contrast, patient c/o gen weakness, pain with R sided abd pain   Vitals afebriele, WNL - fingerstick 290s on arrival  Physical exam with R abd TTP  will eval with labs, xr, ct    Patient feeling better, walking, eating, wanting to leave ED. Patient will contact Nephro physician for dialysis.  Precautions given. - ML

## 2020-03-18 NOTE — ED ADULT NURSE NOTE - NSIMPLEMENTINTERV_GEN_ALL_ED
Implemented All Fall with Harm Risk Interventions:  [place] to call system. Call bell, personal items and telephone within reach. Instruct patient to call for assistance. Room bathroom lighting operational. Non-slip footwear when patient is off stretcher. Physically safe environment: no spills, clutter or unnecessary equipment. Stretcher in lowest position, wheels locked, appropriate side rails in place. Provide visual cue, wrist band, yellow gown, etc. Monitor gait and stability. Monitor for mental status changes and reorient to person, place, and time. Review medications for side effects contributing to fall risk. Reinforce activity limits and safety measures with patient and family. Provide visual clues: red socks.

## 2020-03-18 NOTE — ED ADULT NURSE NOTE - OBJECTIVE STATEMENT
65 y/o female pmh ESRD- currently on dialysis with fistula to left arm (due for dialysis today at 6pm), CHF, COPD, prior CVA with no residual deficits, HLD, CAD with cardiac stents and DM on Insulin presenting to ED by EMS from home c/o hyperglycemia and generalized weakness- no fever x this am. pt states that when she checked her glucose at home her glucometer was reading "HIGH"- states she did not take her long acting insulin last night but did take her Humalog this am. FS upon ED arrival 257. denies an recent fever, nausea, vomiting, cough, dizziness, sick contacts or recent travel. denies any cp or sob. labs and line obtained, results pending. safety and fall precautions maintained. call bell at the bedside and within reach.

## 2020-03-18 NOTE — ED PROVIDER NOTE - PATIENT PORTAL LINK FT
You can access the FollowMyHealth Patient Portal offered by [facility] by registering at the following website: [email or web address]. By joining [employer]’s FollowMyHealth portal, you will also be able to view your health information using other applications (apps) compatible with our system.

## 2020-03-18 NOTE — ED PROVIDER NOTE - NSFOLLOWUPINSTRUCTIONS_ED_ALL_ED_FT
Followup with primary care provider and nephrologist for dialysis.  Contact your endocrinologist if you have difficulty with Glucose control.   Come back to ED for any change of symptoms, fever, nausea, vomiting, persistent weakness. Followup with primary care provider and nephrologist for dialysis.  Contact your endocrinologist if you have difficulty with Glucose control.     You will be taken to dialysis today.   Come back to ED for any change of symptoms, fever, nausea, vomiting, persistent weakness.

## 2020-03-18 NOTE — PATIENT PROFILE ADULT - NSASFALLATTEMPTOOB_GEN_A_NUR
Reason for Call:  Other call back    Detailed comments: Mom is calling in stating that patient has 2 house's that he goes in between and needs another inhaler so one cn be kept at each house , claims her son is a teenager and loses everything, albuterol (VENTOLIN HFA) 108 (90 Base) MCG/ACT inhaler    Phone Number Patient can be reached at: Home number on file [phone] (home)    Best Time: ASAP    Can we leave a detailed message on this number? YES    Call taken on 3/18/2020 at 11:03 AM by WILFREDO Jarrett  [place]   Patient Rep    
Signed Rx.  
no

## 2020-03-18 NOTE — ED PROVIDER NOTE - PROGRESS NOTE DETAILS
Patient feeling better, much improved, ambulating, tolerating food Patient feeling better, much improved, ambulating, tolerating food. Patient feeling better, much improved, ambulating, tolerating food. No chest pain, no weakness. Contacted patient dialysis clinic and thewy will be able to dialyze her tonight at 7:30 pm.

## 2020-03-18 NOTE — ED PROVIDER NOTE - ATTENDING CONTRIBUTION TO CARE
Pt RLQ ab pain, elevated sugar.  No fever, cough, vomiting.  Mild td RLQ, no guarding.  To be dialyzed tonight.

## 2020-03-20 NOTE — CHART NOTE - NSCHARTNOTEFT_GEN_A_CORE
ED SW: Patient transported via BLS on 3/18/2020. Per Face Sheet, patient with Medicare and [employer] insurance benefits. No prior auth required.

## 2020-04-04 NOTE — PROGRESS NOTE ADULT - ASSESSMENT
· Assessment		  63 yo woman with PMH of CAD, DM1 c/b neuropathy and retinopathy, CHF (EF 30%), mod MR, severe AS, RHF, TIA, severe PVD s/p b/l fempop bypass c/b left thrombosed graft, left subclavian vein stenosis s/p stent, COPD not on O2, gout, hilar lymphadenopathy of unknown etiology, frequent hospitalizations (usually 1-3 times a month) p/w SOB, cough, LLE pain found with possible RLL PNA and LLE cellulitis    Shortness of breath.   - Right lower lung opacity: possible PNA.  - continue antibiotics. ID evaluation   - Pulmonary evaluation   -Check ambulatory saturation off supplemental O2.   - CT chest pending     Lower extremity edema.   - LE doppler negative for DVT  - Reference: # [id number] Last prescribed Percocet 5/325 in October 2019  - Percocet prn for pain O/N.     Thrombocytopenia.   - Thrombocytopenia on labs. Unclear if spurious result or true.  - Follow CBC with T&S     Chronic systolic congestive heart failure.  - Appears euvolemic.  - Continue with Lisinopril.   - Cardiology follow.    ESRD (end stage renal disease).   - Renal evaluation Dr. Schmidt  - HD    CAD (coronary artery disease).   -  Troponin elevated likely in setting of ESRD. Similar level to prior labs  - EKG unchanged  - Continue Hydralazine  - Per patient no longer takes Isosorbide Dinitrate   - Cardiology follow.    Diabetes mellitus type 1.  - Compliant with medications  - Continue with Humalog 3U TID premeal  - Continue with corrective SSI  - Continue with Lantus (will start with 8U tonight) as patient with latest glucose of 134.     Prophylactic measure.  -  DVT ppx: Hold Pharmacologic DVT in setting of new thrombocytopenia. SCDs    Fall risk protocol.   Pierce Viera MD pager [phone] Dr Estrella

## 2020-04-06 ENCOUNTER — EMERGENCY (EMERGENCY)
Facility: HOSPITAL | Age: 65
LOS: 1 days | Discharge: HOME | End: 2020-04-06
Attending: EMERGENCY MEDICINE
Payer: MEDICARE

## 2020-04-06 VITALS
DIASTOLIC BLOOD PRESSURE: 82 MMHG | SYSTOLIC BLOOD PRESSURE: 146 MMHG | OXYGEN SATURATION: 95 % | RESPIRATION RATE: 20 BRPM | HEART RATE: 77 BPM

## 2020-04-06 VITALS
TEMPERATURE: 98 F | SYSTOLIC BLOOD PRESSURE: 128 MMHG | OXYGEN SATURATION: 100 % | RESPIRATION RATE: 166 BRPM | DIASTOLIC BLOOD PRESSURE: 75 MMHG | HEART RATE: 76 BPM

## 2020-04-06 DIAGNOSIS — Z98.89 OTHER SPECIFIED POSTPROCEDURAL STATES: Chronic | ICD-10-CM

## 2020-04-06 LAB
ALBUMIN SERPL ELPH-MCNC: 3.7 G/DL — SIGNIFICANT CHANGE UP (ref 3.3–5)
ALP SERPL-CCNC: 164 U/L — HIGH (ref 40–120)
ALT FLD-CCNC: 18 U/L — SIGNIFICANT CHANGE UP (ref 10–45)
ANION GAP SERPL CALC-SCNC: 21 MMOL/L — HIGH (ref 5–17)
ANION GAP SERPL CALC-SCNC: 23 MMOL/L — HIGH (ref 5–17)
AST SERPL-CCNC: 34 U/L — SIGNIFICANT CHANGE UP (ref 10–40)
BASOPHILS # BLD AUTO: 0.01 K/UL — SIGNIFICANT CHANGE UP (ref 0–0.2)
BASOPHILS NFR BLD AUTO: 0.3 % — SIGNIFICANT CHANGE UP (ref 0–2)
BILIRUB SERPL-MCNC: 0.3 MG/DL — SIGNIFICANT CHANGE UP (ref 0.2–1.2)
BUN SERPL-MCNC: 37 MG/DL — HIGH (ref 7–23)
BUN SERPL-MCNC: 38 MG/DL — HIGH (ref 7–23)
CALCIUM SERPL-MCNC: 8.5 MG/DL — SIGNIFICANT CHANGE UP (ref 8.4–10.5)
CALCIUM SERPL-MCNC: 8.7 MG/DL — SIGNIFICANT CHANGE UP (ref 8.4–10.5)
CHLORIDE SERPL-SCNC: 88 MMOL/L — LOW (ref 96–108)
CHLORIDE SERPL-SCNC: 89 MMOL/L — LOW (ref 96–108)
CO2 SERPL-SCNC: 23 MMOL/L — SIGNIFICANT CHANGE UP (ref 22–31)
CO2 SERPL-SCNC: 26 MMOL/L — SIGNIFICANT CHANGE UP (ref 22–31)
CREAT SERPL-MCNC: 6.32 MG/DL — HIGH (ref 0.5–1.3)
CREAT SERPL-MCNC: 6.49 MG/DL — HIGH (ref 0.5–1.3)
EOSINOPHIL # BLD AUTO: 0.04 K/UL — SIGNIFICANT CHANGE UP (ref 0–0.5)
EOSINOPHIL NFR BLD AUTO: 1 % — SIGNIFICANT CHANGE UP (ref 0–6)
GLUCOSE SERPL-MCNC: 230 MG/DL — HIGH (ref 70–99)
GLUCOSE SERPL-MCNC: 247 MG/DL — HIGH (ref 70–99)
HCT VFR BLD CALC: 31.7 % — LOW (ref 34.5–45)
HGB BLD-MCNC: 9.8 G/DL — LOW (ref 11.5–15.5)
IMM GRANULOCYTES NFR BLD AUTO: 0.3 % — SIGNIFICANT CHANGE UP (ref 0–1.5)
LYMPHOCYTES # BLD AUTO: 0.55 K/UL — LOW (ref 1–3.3)
LYMPHOCYTES # BLD AUTO: 14.1 % — SIGNIFICANT CHANGE UP (ref 13–44)
MCHC RBC-ENTMCNC: 30.9 GM/DL — LOW (ref 32–36)
MCHC RBC-ENTMCNC: 32.9 PG — SIGNIFICANT CHANGE UP (ref 27–34)
MCV RBC AUTO: 106.4 FL — HIGH (ref 80–100)
MONOCYTES # BLD AUTO: 0.16 K/UL — SIGNIFICANT CHANGE UP (ref 0–0.9)
MONOCYTES NFR BLD AUTO: 4.1 % — SIGNIFICANT CHANGE UP (ref 2–14)
NEUTROPHILS # BLD AUTO: 3.13 K/UL — SIGNIFICANT CHANGE UP (ref 1.8–7.4)
NEUTROPHILS NFR BLD AUTO: 80.2 % — HIGH (ref 43–77)
NRBC # BLD: 0 /100 WBCS — SIGNIFICANT CHANGE UP (ref 0–0)
PLATELET # BLD AUTO: 214 K/UL — SIGNIFICANT CHANGE UP (ref 150–400)
POTASSIUM SERPL-MCNC: 5.2 MMOL/L — SIGNIFICANT CHANGE UP (ref 3.5–5.3)
POTASSIUM SERPL-MCNC: 5.5 MMOL/L — HIGH (ref 3.5–5.3)
POTASSIUM SERPL-SCNC: 5.2 MMOL/L — SIGNIFICANT CHANGE UP (ref 3.5–5.3)
POTASSIUM SERPL-SCNC: 5.5 MMOL/L — HIGH (ref 3.5–5.3)
PROT SERPL-MCNC: 6.9 G/DL — SIGNIFICANT CHANGE UP (ref 6–8.3)
RBC # BLD: 2.98 M/UL — LOW (ref 3.8–5.2)
RBC # FLD: 14.6 % — HIGH (ref 10.3–14.5)
SODIUM SERPL-SCNC: 135 MMOL/L — SIGNIFICANT CHANGE UP (ref 135–145)
SODIUM SERPL-SCNC: 135 MMOL/L — SIGNIFICANT CHANGE UP (ref 135–145)
WBC # BLD: 3.9 K/UL — SIGNIFICANT CHANGE UP (ref 3.8–10.5)
WBC # FLD AUTO: 3.9 K/UL — SIGNIFICANT CHANGE UP (ref 3.8–10.5)

## 2020-04-06 PROCEDURE — 93010 ELECTROCARDIOGRAM REPORT: CPT

## 2020-04-06 PROCEDURE — 99284 EMERGENCY DEPT VISIT MOD MDM: CPT | Mod: GC

## 2020-04-06 PROCEDURE — 71045 X-RAY EXAM CHEST 1 VIEW: CPT | Mod: 26

## 2020-04-06 NOTE — ED ADULT NURSE NOTE - NSIMPLEMENTINTERV_GEN_ALL_ED
Implemented All Universal Safety Interventions:  [place] to call system. Call bell, personal items and telephone within reach. Instruct patient to call for assistance. Room bathroom lighting operational. Non-slip footwear when patient is off stretcher. Physically safe environment: no spills, clutter or unnecessary equipment. Stretcher in lowest position, wheels locked, appropriate side rails in place.

## 2020-04-06 NOTE — ED PROVIDER NOTE - CLINICAL SUMMARY MEDICAL DECISION MAKING FREE TEXT BOX
Emperatriz Haro MD: 65yo F with PMH of ESRD on HD (M/T/Th/Sat), CHF, COPD not on home O2, prior CVA (no residual deficits), HLD, CAD (s/p stents), DM on Insulin who presents BIBEMS for fever, cough, SOB for 1 week. Pt hemodynamically stable, afebrile. Well appearing. Not hypoxic with clear lungs. Concern for viral pneumonia, electrolyte abnormalities due to missed dialysis. Will check labs, EKG for potassium, CXR

## 2020-04-06 NOTE — ED PROVIDER NOTE - OBJECTIVE STATEMENT
Emperatriz Haro MD: 65yo F with PMH of ESRD on HD (M/T/Th/Sat), CHF, COPD not on home O2, prior CVA (no residual deficits), HLD, CAD (s/p stents), DM on Insulin who presents BIBEMS for fever, cough, SOB for 1 week. Recent CT chest on 3/27 with b/l ground glass opacities. EMS states O2 was 93%, and was placed on NRB for comfort. No hx of intubations. Pt states she has had decreased PO intake. Missed dialysis today, rescheduled for Wednesday. Does not produce urine. No chest pain, palpitations, N/V/D, abdominal pain. Emperatriz Haro MD: 63yo F with PMH of ESRD on HD (M/T/Th/Sat), CHF, COPD not on home O2, prior CVA (no residual deficits), HLD, CAD (s/p stents), DM on Insulin who presents BIBEMS for fever, cough, SOB for 1 week. Here today because of increased weakness. Recent CT chest on 3/27 with b/l ground glass opacities. EMS states O2 was 93%, and was placed on NRB for comfort. No hx of intubations. Pt states she has had decreased PO intake. Dialysis today rescheduled for Wednesday. Does not produce urine. No chest pain, palpitations, N/V/D, abdominal pain.

## 2020-04-06 NOTE — ED PROVIDER NOTE - PHYSICAL EXAMINATION
CONSTITUTIONAL: Nontoxic, well nourished, well developed, elderly female, resting comfortably in no acute distress  HEAD: Normocephalic; atraumatic  EYES: Normal inspection, EOMI  ENMT: External appears normal; normal oropharynx  NECK: Supple; non-tender; no cervical lymphadenopathy  CARD: systolic murmur; no audible murmurs, rubs, or gallops  RESP: No respiratory distress, small crackles at L base, satting 98% on RA  ABD: Soft, non-distended; non-tender; no rebound or guarding  EXT: No LE pitting edema or calf tenderness, LLE swelling chronic; distal pulses intact with good capillary refill, LUE AV fistula  SKIN: Warm, dry, intact, no erythema  NEURO: aaox3, moving all extremities spontaneously

## 2020-04-06 NOTE — ED PROVIDER NOTE - PROGRESS NOTE DETAILS
pt ambulated in ED. O2 sat 98% on RA while ambulating and appeared comfortable. initial K 5.5 but hemolyzed, will repeat. - Jose Hinton MD pt feeling improved. repeat K wnl. to discharge home. return precautions, importance of going to dialysis tomorrow d/w patient. her  will pick her up. - Jose Hinton MD

## 2020-04-06 NOTE — ED PROVIDER NOTE - ATTENDING CONTRIBUTION TO CARE
64F, pmh esrd on m/t/th/sat dialysis, chf, copd, cva, hld, cad s/p stent, IDDM, biba with fever, cough, sob, gen fatigue x 1 week. pt with CT chest performed on 3/27, which noted bilat ground glass opacities, pt presumed to have covid-19. decreased po intake. denies cp, palpitations, abd pain, n/v/d. came to ED because pt states she felt extremely fatigued today. of note pt did not have dialysis today, pt was called and dialysis was rescheduled for Wed. +chronic LLE pain, unchanged.    PE: Nont-xic appearing, NAD, NCAT, MMM, Trachea midline, Normal conjunctiva, lungs CTAB, S1/S2 RRR, Normal perfusion, 2+ radial pulses bilat, Abdomen Soft, NTND, No rebound/guarding, No LE edema, No deformity of extremities, No rashes,  No focal motor or sensory deficits. LUE av fistula with palpable thrill. No LE edema.    Sx most c/w covid-19 infection/pneumonia. Pt with RA sat of 97% in ED. will obtain ambulatory sat. Check CBC eval for anemia, cmp eval for metabolic derangement. Re-eval. - Jose Hinton MD 64F, pmh esrd on m/t/th/sat dialysis, chf, copd, cva, hld, cad s/p stent, IDDM, biba with fever, cough, sob, gen fatigue x 1 week. pt with CT chest performed on 3/27, which noted bilat ground glass opacities, pt presumed to have covid-19. decreased po intake. denies cp, palpitations, abd pain, n/v/d. came to ED because pt states she felt extremely fatigued today. of note pt did not have dialysis today, pt was called and dialysis was rescheduled for Wed. +chronic LLE pain, unchanged.    PE: Nont-xic appearing, NAD, NCAT, MMM, Trachea midline, Normal conjunctiva, lungs CTAB, S1/S2 RRR, Normal perfusion, 2+ radial pulses bilat, Abdomen Soft, NTND, No rebound/guarding, No LE edema, No deformity of extremities, No rashes,  No focal motor or sensory deficits. LUE av fistula with palpable thrill. No LE edema.    Sx most c/w covid-19 infection/pneumonia. Pt with RA sat of 97% in ED. will obtain ambulatory sat. Check CBC eval for anemia, cmp eval for metabolic derangement. Initial ekg unclear rhythm, possibly NSR with pac's, will perform repeat EKG. no cp, no ischemic changes on ekg. will re-eval. - Jose Hinton MD

## 2020-04-06 NOTE — ED PROVIDER NOTE - NSFOLLOWUPINSTRUCTIONS_ED_ALL_ED_FT
Please keep your dialysis appointment tomorrow.     You were seen and evaluated for possible COVID 19 infection. Testing was NOT performed. We think you are safe for discharge and would like you to follow up in a two to three days with your doctor by phone or in person.   You should treat fevers by taking Tylenol as needed.    You should stay hydrated and increase the amount of fluid you drink.  Return to the Emergency Department if your shortness of breath becomes worse, you become weak, or new symptoms develop.    You should monitor your temperature and quarantine yourself away from others - particularly the elderly, ill, or immunosuppressed - for the next 14 days.    We recommend the below precautionary steps from now until 14 days from when you returned from your travel or date of your last known possible contact:  - Do not go to work, school, or public areas. Avoid using public transportation, ride-sharing, or taxis.  -Wear a mask whenever you are around other people.  -Avoid sharing personal household items. You should not share dishes, drinking glasses, cups, eating utensils, towels, or bedding with other people or pets in your home. After using these items, they should be washed thoroughly with soap and water.   - Avoid touching your eyes, nose, and mouth with your hands.  - Wash your hands often with soap and water for at least 15 to 20 seconds or clean your hands with an alcohol-based hand  that contains 60 to 95% alcohol, covering all surfaces of your hands and rubbing them together until they feel dry. Soap and water should be used preferentially if hands are visibly dirty.

## 2020-04-06 NOTE — ED PROVIDER NOTE - PATIENT PORTAL LINK FT
You can access the FollowMyHealth Patient Portal offered by [facility] by registering at the following website: [email or web address]. By joining [employer]’s FollowMyHealth portal, you will also be able to view your health information using other applications (apps) compatible with our system.

## 2020-04-06 NOTE — ED ADULT NURSE NOTE - OBJECTIVE STATEMENT
Pt. is a 63 y/o female brought in by EMS for fevers x1 wk. Pt. has PMH ESRD on dialysis M/Tu/Th/S with fistula in upper left arm, DM on insulin. COPD, does not use O2 at home. For past week has had fever, cough, SOB, loss of appetite, increasing weakness today. Denies CP, N/V/D, numbness, tingling, headache. A&Ox3. Breathing unlabored and spontaneous. Abdomen soft, nontender, nondistended. Full ROM of extremities. Pt. uses cane to ambulate, has swelling of left leg which she states is chronic. EMS brought in pt on nrb, stated she was 93% on room air,  pt currently 100& on room air in ED. Safety and comfort measures provided.

## 2020-04-07 ENCOUNTER — INPATIENT (INPATIENT)
Facility: HOSPITAL | Age: 65
LOS: 4 days | Discharge: HOME | DRG: 871 | End: 2020-04-12
Attending: INTERNAL MEDICINE | Admitting: INTERNAL MEDICINE
Payer: MEDICARE

## 2020-04-07 VITALS
DIASTOLIC BLOOD PRESSURE: 68 MMHG | SYSTOLIC BLOOD PRESSURE: 114 MMHG | HEIGHT: 64 IN | RESPIRATION RATE: 26 BRPM | HEART RATE: 116 BPM | WEIGHT: 125 LBS | TEMPERATURE: 101 F | OXYGEN SATURATION: 100 %

## 2020-04-07 DIAGNOSIS — J96.01 ACUTE RESPIRATORY FAILURE WITH HYPOXIA: ICD-10-CM

## 2020-04-07 DIAGNOSIS — N18.6 END STAGE RENAL DISEASE: ICD-10-CM

## 2020-04-07 DIAGNOSIS — E11.65 TYPE 2 DIABETES MELLITUS WITH HYPERGLYCEMIA: ICD-10-CM

## 2020-04-07 DIAGNOSIS — U07.1 COVID-19: ICD-10-CM

## 2020-04-07 DIAGNOSIS — R09.89 OTHER SPECIFIED SYMPTOMS AND SIGNS INVOLVING THE CIRCULATORY AND RESPIRATORY SYSTEMS: ICD-10-CM

## 2020-04-07 DIAGNOSIS — Z98.89 OTHER SPECIFIED POSTPROCEDURAL STATES: Chronic | ICD-10-CM

## 2020-04-07 DIAGNOSIS — Z71.89 OTHER SPECIFIED COUNSELING: ICD-10-CM

## 2020-04-07 DIAGNOSIS — A41.9 SEPSIS, UNSPECIFIED ORGANISM: ICD-10-CM

## 2020-04-07 DIAGNOSIS — I21.3 ST ELEVATION (STEMI) MYOCARDIAL INFARCTION OF UNSPECIFIED SITE: ICD-10-CM

## 2020-04-07 DIAGNOSIS — Z29.9 ENCOUNTER FOR PROPHYLACTIC MEASURES, UNSPECIFIED: ICD-10-CM

## 2020-04-07 LAB
ALBUMIN SERPL ELPH-MCNC: 3.6 G/DL — SIGNIFICANT CHANGE UP (ref 3.3–5)
ALP SERPL-CCNC: 183 U/L — HIGH (ref 40–120)
ALT FLD-CCNC: 23 U/L — SIGNIFICANT CHANGE UP (ref 10–45)
ANION GAP SERPL CALC-SCNC: 29 MMOL/L — HIGH (ref 5–17)
AST SERPL-CCNC: 55 U/L — HIGH (ref 10–40)
BASE EXCESS BLDV CALC-SCNC: 0.1 MMOL/L — SIGNIFICANT CHANGE UP (ref -2–2)
BASOPHILS # BLD AUTO: 0 K/UL — SIGNIFICANT CHANGE UP (ref 0–0.2)
BASOPHILS NFR BLD AUTO: 0 % — SIGNIFICANT CHANGE UP (ref 0–2)
BILIRUB SERPL-MCNC: 0.6 MG/DL — SIGNIFICANT CHANGE UP (ref 0.2–1.2)
BUN SERPL-MCNC: 18 MG/DL — SIGNIFICANT CHANGE UP (ref 7–23)
CA-I SERPL-SCNC: 1.06 MMOL/L — LOW (ref 1.12–1.3)
CALCIUM SERPL-MCNC: 8.8 MG/DL — SIGNIFICANT CHANGE UP (ref 8.4–10.5)
CHLORIDE BLDV-SCNC: 92 MMOL/L — LOW (ref 96–108)
CHLORIDE SERPL-SCNC: 87 MMOL/L — LOW (ref 96–108)
CO2 BLDV-SCNC: 26 MMOL/L — SIGNIFICANT CHANGE UP (ref 22–30)
CO2 SERPL-SCNC: 21 MMOL/L — LOW (ref 22–31)
CREAT SERPL-MCNC: 3.35 MG/DL — HIGH (ref 0.5–1.3)
D DIMER BLD IA.RAPID-MCNC: 927 NG/ML DDU — HIGH
EOSINOPHIL # BLD AUTO: 0 K/UL — SIGNIFICANT CHANGE UP (ref 0–0.5)
EOSINOPHIL NFR BLD AUTO: 0 % — SIGNIFICANT CHANGE UP (ref 0–6)
FERRITIN SERPL-MCNC: 5219 NG/ML — HIGH (ref 15–150)
FIBRINOGEN PPP-MCNC: 784 MG/DL — HIGH (ref 350–510)
GAS PNL BLDV: 136 MMOL/L — SIGNIFICANT CHANGE UP (ref 135–145)
GAS PNL BLDV: SIGNIFICANT CHANGE UP
GAS PNL BLDV: SIGNIFICANT CHANGE UP
GLUCOSE BLDC GLUCOMTR-MCNC: 488 MG/DL — CRITICAL HIGH (ref 70–99)
GLUCOSE BLDV-MCNC: 385 MG/DL — HIGH (ref 70–99)
GLUCOSE SERPL-MCNC: 424 MG/DL — HIGH (ref 70–99)
HCO3 BLDV-SCNC: 24 MMOL/L — SIGNIFICANT CHANGE UP (ref 21–29)
HCT VFR BLD CALC: 29.4 % — LOW (ref 34.5–45)
HCT VFR BLDA CALC: 28 % — LOW (ref 39–50)
HGB BLD CALC-MCNC: 9 G/DL — LOW (ref 11.5–15.5)
HGB BLD-MCNC: 9.2 G/DL — LOW (ref 11.5–15.5)
LACTATE BLDV-MCNC: 4 MMOL/L — CRITICAL HIGH (ref 0.7–2)
LDH SERPL L TO P-CCNC: 408 U/L — HIGH (ref 50–242)
LYMPHOCYTES # BLD AUTO: 0.08 K/UL — LOW (ref 1–3.3)
LYMPHOCYTES # BLD AUTO: 0.9 % — LOW (ref 13–44)
MCHC RBC-ENTMCNC: 31.3 GM/DL — LOW (ref 32–36)
MCHC RBC-ENTMCNC: 32.9 PG — SIGNIFICANT CHANGE UP (ref 27–34)
MCV RBC AUTO: 105 FL — HIGH (ref 80–100)
MONOCYTES # BLD AUTO: 0.08 K/UL — SIGNIFICANT CHANGE UP (ref 0–0.9)
MONOCYTES NFR BLD AUTO: 0.9 % — LOW (ref 2–14)
NEUTROPHILS # BLD AUTO: 8.82 K/UL — HIGH (ref 1.8–7.4)
NEUTROPHILS NFR BLD AUTO: 86.1 % — HIGH (ref 43–77)
PCO2 BLDV: 41 MMHG — SIGNIFICANT CHANGE UP (ref 35–50)
PH BLDV: 7.39 — SIGNIFICANT CHANGE UP (ref 7.35–7.45)
PLATELET # BLD AUTO: 223 K/UL — SIGNIFICANT CHANGE UP (ref 150–400)
PO2 BLDV: 22 MMHG — LOW (ref 25–45)
POTASSIUM BLDV-SCNC: 4.6 MMOL/L — SIGNIFICANT CHANGE UP (ref 3.5–5.3)
POTASSIUM SERPL-MCNC: 4.6 MMOL/L — SIGNIFICANT CHANGE UP (ref 3.5–5.3)
POTASSIUM SERPL-SCNC: 4.6 MMOL/L — SIGNIFICANT CHANGE UP (ref 3.5–5.3)
PROCALCITONIN SERPL-MCNC: 6.59 NG/ML — HIGH (ref 0.02–0.1)
PROT SERPL-MCNC: 6.7 G/DL — SIGNIFICANT CHANGE UP (ref 6–8.3)
RBC # BLD: 2.8 M/UL — LOW (ref 3.8–5.2)
RBC # FLD: 14.6 % — HIGH (ref 10.3–14.5)
SAO2 % BLDV: 25 % — LOW (ref 67–88)
SARS-COV-2 RNA SPEC QL NAA+PROBE: DETECTED
SODIUM SERPL-SCNC: 137 MMOL/L — SIGNIFICANT CHANGE UP (ref 135–145)
TROPONIN T, HIGH SENSITIVITY RESULT: 479 NG/L — HIGH (ref 0–51)
WBC # BLD: 9.14 K/UL — SIGNIFICANT CHANGE UP (ref 3.8–10.5)
WBC # FLD AUTO: 9.14 K/UL — SIGNIFICANT CHANGE UP (ref 3.8–10.5)

## 2020-04-07 PROCEDURE — 99223 1ST HOSP IP/OBS HIGH 75: CPT

## 2020-04-07 PROCEDURE — 99497 ADVNCD CARE PLAN 30 MIN: CPT | Mod: 25

## 2020-04-07 PROCEDURE — 99285 EMERGENCY DEPT VISIT HI MDM: CPT | Mod: GC

## 2020-04-07 PROCEDURE — 71045 X-RAY EXAM CHEST 1 VIEW: CPT | Mod: 26

## 2020-04-07 PROCEDURE — 93010 ELECTROCARDIOGRAM REPORT: CPT | Mod: GC

## 2020-04-07 RX ORDER — ACETAMINOPHEN 500 MG
650 TABLET ORAL EVERY 4 HOURS
Refills: 0 | Status: DISCONTINUED | OUTPATIENT
Start: 2020-04-07 | End: 2020-04-12

## 2020-04-07 RX ORDER — HEPARIN SODIUM 5000 [USP'U]/ML
4500 INJECTION INTRAVENOUS; SUBCUTANEOUS ONCE
Refills: 0 | Status: DISCONTINUED | OUTPATIENT
Start: 2020-04-07 | End: 2020-04-07

## 2020-04-07 RX ORDER — OXYCODONE HYDROCHLORIDE 5 MG/1
5 TABLET ORAL ONCE
Refills: 0 | Status: DISCONTINUED | OUTPATIENT
Start: 2020-04-07 | End: 2020-04-07

## 2020-04-07 RX ORDER — HEPARIN SODIUM 5000 [USP'U]/ML
4500 INJECTION INTRAVENOUS; SUBCUTANEOUS EVERY 6 HOURS
Refills: 0 | Status: DISCONTINUED | OUTPATIENT
Start: 2020-04-07 | End: 2020-04-07

## 2020-04-07 RX ORDER — INSULIN GLARGINE 100 [IU]/ML
13 INJECTION, SOLUTION SUBCUTANEOUS ONCE
Refills: 0 | Status: COMPLETED | OUTPATIENT
Start: 2020-04-07 | End: 2020-04-07

## 2020-04-07 RX ORDER — MIDODRINE HYDROCHLORIDE 2.5 MG/1
10 TABLET ORAL THREE TIMES A DAY
Refills: 0 | Status: DISCONTINUED | OUTPATIENT
Start: 2020-04-07 | End: 2020-04-12

## 2020-04-07 RX ORDER — DEXTROSE 50 % IN WATER 50 %
25 SYRINGE (ML) INTRAVENOUS ONCE
Refills: 0 | Status: DISCONTINUED | OUTPATIENT
Start: 2020-04-07 | End: 2020-04-08

## 2020-04-07 RX ORDER — HEPARIN SODIUM 5000 [USP'U]/ML
INJECTION INTRAVENOUS; SUBCUTANEOUS
Qty: 25000 | Refills: 0 | Status: DISCONTINUED | OUTPATIENT
Start: 2020-04-07 | End: 2020-04-07

## 2020-04-07 RX ORDER — METOPROLOL TARTRATE 50 MG
50 TABLET ORAL DAILY
Refills: 0 | Status: DISCONTINUED | OUTPATIENT
Start: 2020-04-07 | End: 2020-04-12

## 2020-04-07 RX ORDER — ATORVASTATIN CALCIUM 80 MG/1
80 TABLET, FILM COATED ORAL AT BEDTIME
Refills: 0 | Status: DISCONTINUED | OUTPATIENT
Start: 2020-04-07 | End: 2020-04-12

## 2020-04-07 RX ORDER — HEPARIN SODIUM 5000 [USP'U]/ML
3200 INJECTION INTRAVENOUS; SUBCUTANEOUS EVERY 6 HOURS
Refills: 0 | Status: DISCONTINUED | OUTPATIENT
Start: 2020-04-07 | End: 2020-04-08

## 2020-04-07 RX ORDER — DEXAMETHASONE 0.5 MG/5ML
10 ELIXIR ORAL ONCE
Refills: 0 | Status: DISCONTINUED | OUTPATIENT
Start: 2020-04-07 | End: 2020-04-07

## 2020-04-07 RX ORDER — HYDROXYCHLOROQUINE SULFATE 200 MG
200 TABLET ORAL EVERY 12 HOURS
Refills: 0 | Status: DISCONTINUED | OUTPATIENT
Start: 2020-04-08 | End: 2020-04-08

## 2020-04-07 RX ORDER — SODIUM CHLORIDE 9 MG/ML
1000 INJECTION, SOLUTION INTRAVENOUS
Refills: 0 | Status: DISCONTINUED | OUTPATIENT
Start: 2020-04-07 | End: 2020-04-12

## 2020-04-07 RX ORDER — DEXTROSE 50 % IN WATER 50 %
12.5 SYRINGE (ML) INTRAVENOUS ONCE
Refills: 0 | Status: DISCONTINUED | OUTPATIENT
Start: 2020-04-07 | End: 2020-04-12

## 2020-04-07 RX ORDER — ONDANSETRON 8 MG/1
4 TABLET, FILM COATED ORAL EVERY 12 HOURS
Refills: 0 | Status: DISCONTINUED | OUTPATIENT
Start: 2020-04-07 | End: 2020-04-12

## 2020-04-07 RX ORDER — CEFTRIAXONE 500 MG/1
1000 INJECTION, POWDER, FOR SOLUTION INTRAMUSCULAR; INTRAVENOUS ONCE
Refills: 0 | Status: COMPLETED | OUTPATIENT
Start: 2020-04-07 | End: 2020-04-07

## 2020-04-07 RX ORDER — SEVELAMER CARBONATE 2400 MG/1
800 POWDER, FOR SUSPENSION ORAL
Refills: 0 | Status: DISCONTINUED | OUTPATIENT
Start: 2020-04-07 | End: 2020-04-12

## 2020-04-07 RX ORDER — ZINC SULFATE TAB 220 MG (50 MG ZINC EQUIVALENT) 220 (50 ZN) MG
220 TAB ORAL DAILY
Refills: 0 | Status: DISCONTINUED | OUTPATIENT
Start: 2020-04-07 | End: 2020-04-12

## 2020-04-07 RX ORDER — INSULIN LISPRO 100/ML
VIAL (ML) SUBCUTANEOUS
Refills: 0 | Status: DISCONTINUED | OUTPATIENT
Start: 2020-04-07 | End: 2020-04-08

## 2020-04-07 RX ORDER — ACETAMINOPHEN 500 MG
650 TABLET ORAL ONCE
Refills: 0 | Status: COMPLETED | OUTPATIENT
Start: 2020-04-07 | End: 2020-04-07

## 2020-04-07 RX ORDER — CEFTRIAXONE 500 MG/1
1000 INJECTION, POWDER, FOR SOLUTION INTRAMUSCULAR; INTRAVENOUS EVERY 24 HOURS
Refills: 0 | Status: DISCONTINUED | OUTPATIENT
Start: 2020-04-07 | End: 2020-04-12

## 2020-04-07 RX ORDER — CLOPIDOGREL BISULFATE 75 MG/1
75 TABLET, FILM COATED ORAL DAILY
Refills: 0 | Status: DISCONTINUED | OUTPATIENT
Start: 2020-04-07 | End: 2020-04-12

## 2020-04-07 RX ORDER — HEPARIN SODIUM 5000 [USP'U]/ML
2000 INJECTION INTRAVENOUS; SUBCUTANEOUS EVERY 6 HOURS
Refills: 0 | Status: DISCONTINUED | OUTPATIENT
Start: 2020-04-07 | End: 2020-04-07

## 2020-04-07 RX ORDER — PANTOPRAZOLE SODIUM 20 MG/1
40 TABLET, DELAYED RELEASE ORAL
Refills: 0 | Status: DISCONTINUED | OUTPATIENT
Start: 2020-04-07 | End: 2020-04-12

## 2020-04-07 RX ORDER — INSULIN GLARGINE 100 [IU]/ML
13 INJECTION, SOLUTION SUBCUTANEOUS AT BEDTIME
Refills: 0 | Status: DISCONTINUED | OUTPATIENT
Start: 2020-04-08 | End: 2020-04-08

## 2020-04-07 RX ORDER — ASPIRIN/CALCIUM CARB/MAGNESIUM 324 MG
81 TABLET ORAL ONCE
Refills: 0 | Status: COMPLETED | OUTPATIENT
Start: 2020-04-07 | End: 2020-04-07

## 2020-04-07 RX ORDER — GLUCAGON INJECTION, SOLUTION 0.5 MG/.1ML
1 INJECTION, SOLUTION SUBCUTANEOUS ONCE
Refills: 0 | Status: DISCONTINUED | OUTPATIENT
Start: 2020-04-07 | End: 2020-04-12

## 2020-04-07 RX ORDER — HYDROXYCHLOROQUINE SULFATE 200 MG
400 TABLET ORAL EVERY 12 HOURS
Refills: 0 | Status: COMPLETED | OUTPATIENT
Start: 2020-04-07 | End: 2020-04-08

## 2020-04-07 RX ORDER — HEPARIN SODIUM 5000 [USP'U]/ML
INJECTION INTRAVENOUS; SUBCUTANEOUS
Qty: 25000 | Refills: 0 | Status: DISCONTINUED | OUTPATIENT
Start: 2020-04-07 | End: 2020-04-08

## 2020-04-07 RX ORDER — HEPARIN SODIUM 5000 [USP'U]/ML
4000 INJECTION INTRAVENOUS; SUBCUTANEOUS ONCE
Refills: 0 | Status: COMPLETED | OUTPATIENT
Start: 2020-04-07 | End: 2020-04-07

## 2020-04-07 RX ORDER — HYDROXYCHLOROQUINE SULFATE 200 MG
TABLET ORAL
Refills: 0 | Status: DISCONTINUED | OUTPATIENT
Start: 2020-04-07 | End: 2020-04-08

## 2020-04-07 RX ORDER — ASPIRIN/CALCIUM CARB/MAGNESIUM 324 MG
81 TABLET ORAL DAILY
Refills: 0 | Status: DISCONTINUED | OUTPATIENT
Start: 2020-04-07 | End: 2020-04-12

## 2020-04-07 RX ORDER — DEXTROSE 50 % IN WATER 50 %
15 SYRINGE (ML) INTRAVENOUS ONCE
Refills: 0 | Status: DISCONTINUED | OUTPATIENT
Start: 2020-04-07 | End: 2020-04-12

## 2020-04-07 RX ORDER — AZITHROMYCIN 500 MG/1
500 TABLET, FILM COATED ORAL ONCE
Refills: 0 | Status: COMPLETED | OUTPATIENT
Start: 2020-04-07 | End: 2020-04-07

## 2020-04-07 RX ORDER — HEPARIN SODIUM 5000 [USP'U]/ML
4000 INJECTION INTRAVENOUS; SUBCUTANEOUS EVERY 6 HOURS
Refills: 0 | Status: DISCONTINUED | OUTPATIENT
Start: 2020-04-07 | End: 2020-04-07

## 2020-04-07 RX ORDER — INSULIN LISPRO 100/ML
VIAL (ML) SUBCUTANEOUS AT BEDTIME
Refills: 0 | Status: DISCONTINUED | OUTPATIENT
Start: 2020-04-07 | End: 2020-04-08

## 2020-04-07 RX ORDER — ALBUTEROL 90 UG/1
2 AEROSOL, METERED ORAL EVERY 4 HOURS
Refills: 0 | Status: DISCONTINUED | OUTPATIENT
Start: 2020-04-07 | End: 2020-04-12

## 2020-04-07 RX ADMIN — Medication 650 MILLIGRAM(S): at 17:54

## 2020-04-07 RX ADMIN — Medication 81 MILLIGRAM(S): at 19:05

## 2020-04-07 RX ADMIN — CEFTRIAXONE 1000 MILLIGRAM(S): 500 INJECTION, POWDER, FOR SOLUTION INTRAMUSCULAR; INTRAVENOUS at 21:10

## 2020-04-07 RX ADMIN — MIDODRINE HYDROCHLORIDE 10 MILLIGRAM(S): 2.5 TABLET ORAL at 20:02

## 2020-04-07 RX ADMIN — HEPARIN SODIUM 1000 UNIT(S)/HR: 5000 INJECTION INTRAVENOUS; SUBCUTANEOUS at 20:45

## 2020-04-07 RX ADMIN — AZITHROMYCIN 500 MILLIGRAM(S): 500 TABLET, FILM COATED ORAL at 19:28

## 2020-04-07 RX ADMIN — HEPARIN SODIUM 4000 UNIT(S): 5000 INJECTION INTRAVENOUS; SUBCUTANEOUS at 20:44

## 2020-04-07 RX ADMIN — Medication 4: at 22:32

## 2020-04-07 RX ADMIN — CEFTRIAXONE 100 MILLIGRAM(S): 500 INJECTION, POWDER, FOR SOLUTION INTRAMUSCULAR; INTRAVENOUS at 19:27

## 2020-04-07 RX ADMIN — INSULIN GLARGINE 13 UNIT(S): 100 INJECTION, SOLUTION SUBCUTANEOUS at 22:32

## 2020-04-07 RX ADMIN — Medication 40 MILLIGRAM(S): at 19:29

## 2020-04-07 RX ADMIN — OXYCODONE HYDROCHLORIDE 5 MILLIGRAM(S): 5 TABLET ORAL at 19:57

## 2020-04-07 RX ADMIN — ATORVASTATIN CALCIUM 80 MILLIGRAM(S): 80 TABLET, FILM COATED ORAL at 22:39

## 2020-04-07 RX ADMIN — Medication 400 MILLIGRAM(S): at 22:31

## 2020-04-07 NOTE — CONSULT NOTE ADULT - SUBJECTIVE AND OBJECTIVE BOX
Interventional Cardiology Chart Note  ---------------------------------------------    HPI:  63yo woman with HTN, HLD, DM, ESRD on HD who presented from dialysis session today because of combination of increased somnolence, SOB, and fever. Of note, patient had presented to the ED yesterday because of SOB, and was sent home because of concerns for COVID-19.     Upon current ED presentation, she was found to be febrile to 101F, and mild tachycardia HR 110s, SBP 110s. EKG revealed new VENUS in inferior leads. Cardiology was consulted for possible cath. Patient is currently chest pain free, and denied ever having chest pain.    PMHx:   COPD (chronic obstructive pulmonary disease)  Gout  Peripheral vascular disease  Coronary artery disease  ESRD (end stage renal disease)  TIA (transient ischemic attack)  x 3 2005 2 nt to VSD/ASD repair  Diabetes mellitus type II  HTN (hypertension), benign  Hyperlipidemia    PSHx:   Status post device closure of ASD  S/P cholecystectomy  S/P femoral-popliteal bypass surgery  S/P cholecystectomy  ASD OR VSD  Repair 2005    Allergies:  No Known Allergies    Current Medications:   aspirin  chewable 81 milliGRAM(s) Oral once    FAMILY HISTORY:  Family history of smoking  Family history of hypertension  Family history of cancer (Sibling)    REVIEW OF SYSTEMS:  All other review of systems is negative unless indicated above.    Physical Exam:  T(F): 101.3 (04-07), Max: 101.3 (04-07)  HR: 116 (04-07) (77 - 116)  BP: 114/68 (04-07) (114/68 - 146/82)  RR: 26 (04-07)  SpO2: 100% (04-07)    Physical exam as per referring provider.    CXR: Personally reviewed    Labs: Personally reviewed                        9.8    3.90  )-----------( 214      ( 06 Apr 2020 17:15 )             31.7     04-06    135  |  88<L>  |  38<H>  ----------------------------<  247<H>  5.2   |  26  |  6.49<H>    Ca    8.7      06 Apr 2020 19:29  Mg     2.5     04-06    TPro  6.9  /  Alb  3.7  /  TBili  0.3  /  DBili  x   /  AST  34  /  ALT  18  /  AlkPhos  164<H>  04-06

## 2020-04-07 NOTE — ED ADULT NURSE REASSESSMENT NOTE - NS ED NURSE REASSESS COMMENT FT1
Report received from KRISTINE Andrade. Pt resting in bed at this time. VS as documented. Pt hypotensive, MD Lewis aware. Bed locked and lowered. Comfort and safety measures maintained. Report received from KRISTINE Andrade. Pt resting in bed at this time. VS as documented. Pt hypotensive, MD Lewis aware. A&O x4. Bed locked and lowered. Comfort and safety measures maintained.

## 2020-04-07 NOTE — H&P ADULT - PROBLEM SELECTOR PLAN 1
-fever, cough, sob, encephalopathy; presents with septic shock and hypoxia, found with lymphopenia, elevated lactate/procal/ldh/ddimer, and cxr showing b/l opacities, COVID19 PCR now positive.   -admit to medicine with airborne + contact isolation  -Supplemental oxygen as required. Can escalate to NRB if required, would avoid bilevel support at this time.  -check crp, LDH, ferritin, procalcitonin, d-dimer, coags, fibrinogen, cpk/trop, lactate  -ID eval in am defer to day attending to arrange  -c/w plaquenil taper started by ER; qtc noted 498 would avoid azithro  -patient with concurrent copd; s/p solumedrol 40 iv x 1 in ER. unclear if true copd exacerbation, no significant wheeze but moving air poorly b/l.  Will continue 1mg/kg solu-medrol divided bid; start solu-medrol 20 IV bid x 3 days and reassess  -MDI albuterol prn  -given elevated lactate and procal and multiple comorbidities + septic shock, seems reasonable to continue with empiric coverage of bacterial superinfection.  c/w ceftriaxone for now, hold azithro for elevated qtc  -check legionella  -zofran, robitussin, tessalon, tylenol prn

## 2020-04-07 NOTE — H&P ADULT - NSHPPHYSICALEXAM_GEN_ALL_CORE
PHYSICAL EXAM:    Vital Signs Last 24 Hrs  T(C): 37.9 (07 Apr 2020 19:19), Max: 39.8 (07 Apr 2020 17:24)  T(F): 100.2 (07 Apr 2020 19:19), Max: 103.7 (07 Apr 2020 17:24)  HR: 111 (07 Apr 2020 20:26) (77 - 122)  BP: 85/65 (07 Apr 2020 20:41) (83/51 - 146/82)  BP(mean): 72 (07 Apr 2020 20:41) (63 - 72)  RR: 20 (07 Apr 2020 20:26) (20 - 26)  SpO2: 98% (07 Apr 2020 20:26) (95% - 100%)    GENERAL: NAD, well-groomed, well-developed  HEAD:  Atraumatic, Normocephalic  EYES: EOMI, PERRLA, conjunctiva and sclera clear  ENMT: No tonsillar erythema, exudates, or enlargement; Moist mucous membranes, Good dentition, No lesions  NECK: Supple, No JVD, Normal thyroid  CHEST/LUNG: Clear to percussion bilaterally; No rales, rhonchi, wheezing, or rubs  HEART: Regular rate and rhythm; No murmurs, rubs, or gallops  ABDOMEN: Soft, Nontender, Nondistended; Bowel sounds present  EXTREMITIES:  2+ Peripheral Pulses, No clubbing, cyanosis, or edema  LYMPH: No lymphadenopathy noted  SKIN: No rashes or lesions  NERVOUS SYSTEM:  Alert & Oriented X3, Good concentration; Motor Strength 5/5 B/L upper and lower extremities; DTRs 2+ intact and symmetric PHYSICAL EXAM:    Vital Signs Last 24 Hrs  T(C): 37.9 (07 Apr 2020 19:19), Max: 39.8 (07 Apr 2020 17:24)  T(F): 100.2 (07 Apr 2020 19:19), Max: 103.7 (07 Apr 2020 17:24)  HR: 111 (07 Apr 2020 20:26) (77 - 122)  BP: 85/65 (07 Apr 2020 20:41) (83/51 - 146/82)  BP(mean): 72 (07 Apr 2020 20:41) (63 - 72)  RR: 20 (07 Apr 2020 20:26) (20 - 26)  SpO2: 98% (07 Apr 2020 20:26) (95% - 100%)    GENERAL: appears older than stated age. chronically ill appearing. nad.   HEAD:  Atraumatic, Normocephalic  EYES: EOMI, PERRLA, conjunctiva and sclera clear  ENMT: No tonsillar erythema, exudates, or enlargement; dry mucous membranes, poor dentition, No lesions  NECK: Supple, No JVD, Normal thyroid  CHEST/LUNG: coarse b/s bl, dec bs at bases. breathing through pursed lips.   HEART: tachy rate and reg rhythm; No murmurs, rubs, or gallops. +LLE trace edema  ABDOMEN: Soft, Nontender, Nondistended; Bowel sounds present no rebound/guarding  EXTREMITIES:  2+ Peripheral Pulses, No clubbing, cyanosis  LYMPH: No lymphadenopathy noted, no lymphangitis  SKIN: No rashes or lesions no petechiae  NERVOUS SYSTEM:  Alert & Oriented X2-3, waxing/waning concentration; Motor Strength 5/5 B/L upper and lower extremities; no facial droop or dysarthria.

## 2020-04-07 NOTE — H&P ADULT - ASSESSMENT
64 F PMH CAD Sp PTCA w stents CHF,COPD, CVA, DMT1, ESRD on HD (M,Tu,Th,Sa), Gout, HLD, PVD, Sublcavian Stensosis (L) sp stent. PSH ASD Repair,idania,fem-pop bypass, recurrent admission for hyper/hypoglycemia/DKA, p/w fever, cough and encephalopathy, found to be in septic shock with hypoxic respiratory failure, acute STEMI suspected 2/2 possible in stent re-stenosis exacerbated by viral illness/tachycardia, and with hyperglycemia/elevated anion gap with compensated acidosis.

## 2020-04-07 NOTE — H&P ADULT - ATTENDING COMMENTS
Care to be assumed by Dr. Viera at 8 am.  This patient was assigned to me by the hospitalist in charge; my involvement in this case has consisted of the initial history, physical, chart review, and management plan.  This patient was previously unknown to me.  Case endorsed to NP ______ at ____. Care to be assumed by Dr. Viera at 8 am.  This patient was assigned to me by the hospitalist in charge; my involvement in this case has consisted of the initial history, physical, chart review, and management plan.  This patient was previously unknown to me.  Case endorsed to SARAH Salmon covering 6 albert ([id number]) at 950 pm.  She is aware of entire outlined plan above.  She is aware of escalation plan should patient deteriorate. Care to be assumed by Dr. Viera at 8 am.  This patient was assigned to me by the hospitalist in charge; my involvement in this case has consisted of the initial history, physical, chart review, and management plan.   Case endorsed to SARAH Salmon covering 6 albert ([id number]) at 950 pm.  She is aware of entire outlined plan above.  She is aware of escalation plan should patient deteriorate.

## 2020-04-07 NOTE — H&P ADULT - NSHPSOCIALHISTORY_GEN_ALL_CORE
Social History:    Marital Status:  (x   )    (   ) Single    (   )    (  )   Occupation: retired  Lives with: (  ) alone  (  ) children   (x  ) spouse   (  ) parents  (  ) other    Substance Use (street drugs): ( x ) never used  (  ) other:  Tobacco Usage:  (   ) never smoked   (  x ) former smoker   (   ) current smoker  (     ) pack year  (        ) last cigarette date  Alcohol Usage: denies

## 2020-04-07 NOTE — GOALS OF CARE CONVERSATION - ADVANCED CARE PLANNING - CONVERSATION DETAILS
-20 minutes spent clarifying advanced directives with patient and /family ia phone.   -I have discussed the pt's presentation, and family is aware that she has presented with septic shock and hypoxic respiratory failure in the setting of COVID19 pcr confirmed  pna.  I have also mentioned to her  Chemo that she appears to have a concurrent STEMI that is being medically managed due to high R/B ratio given above noted concurrent findings.   -We have discussed the current treatment plan including plaquenil, short course steroids, empiric IV CTX for coverage of bacterial superinfection, and medically managed STEMI with heparin gtt.   -At this time, patient DOES NOT demonstrates capacity with insight into condition.   -Given her multiple comorbidities (CAD with multiple stents, ESRD on HD, COPD/former smoker), current septic shock and hypoxic respiratory failure, and current medically managed STEMI, her family was made aware of her guarded prognosis and likely poor outcomes if she were intubated/resucitated in the event of cardiac or respiratory arrest.  Pt's  as next of kin and son have made the following decision, based on patient's prior wishes.   -pt is CONFIRMED TO BE DNR/DNI. trial of IV fluids/abx acceptable.  -This conversation was witnessed on the phone by my colleague, Dr. Nancy Vallecillo.  ANUP filled out, to be placed in paper chart, DNR/DNI order placed in [facility].    -All questions answered at bedside and on phone.

## 2020-04-07 NOTE — H&P ADULT - PROBLEM SELECTOR PLAN 6
-pt with fsg in 400s, and now on short course steroids for covid19/copd  -pt taking Lantus 8/humalog 4 premeal at home  -will increase basal bolus as follows, similar to prior hospitalization: Lantus 13, + HISS  -consider endo eval  -currently pt has compensated multifactorial met.acidosis; bicarb >18 and pH nearly normal.  AG Is noted 29 but with elevated lactate is likely multifactorial. Check BHB. Will dose basal insulin now and monitor AG.

## 2020-04-07 NOTE — ED PROVIDER NOTE - CLINICAL SUMMARY MEDICAL DECISION MAKING FREE TEXT BOX
See Attending Note 65 yo F presenting with sob, fever, ams in setting of COVID like sx, decreased PO intake, seen in ED yesterday, VSS, LLE chronic swelling r/o dvt. XR, labs, covid swab, tba. See Attending Note

## 2020-04-07 NOTE — ED ADULT NURSE NOTE - OBJECTIVE STATEMENT
64 y F hx of HTN, HLD, ESRD with left fistula receiving dialysis Monday, Thursday, and Saturday, CHF, COPD, and CVA w/o residual defects presents from home with altered mental status via EMS. EMS reports to fever, cough, and increased lethargy following dialysis today. Pt. was seen yesterday for hyperglycemia and discharged home. Blood sugar in field 390s. A&Ox0, responds to pain. Skin warm dry and intact. Breathing comfortably- no distress. Abdomen soft and nondistended. Rectal temp 103.7. Safety maintained- bed locked in lowest position.

## 2020-04-07 NOTE — H&P ADULT - NSHPREVIEWOFSYSTEMS_GEN_ALL_CORE
REVIEW OF SYSTEMS:  CONSTITUTIONAL: +weakness. +fevers. +chills. No weight loss. Good appetite.  EYES: No visual changes. No eye pain.  ENT: No hearing difficulty. No vertigo. No dysphagia. No Sinusitis/rhinorrhea.  NECK: No pain. No stiffness/rigidity.  CARDIAC: No chest pain. No palpitations.  RESPIRATORY: +cough. +SOB. No hemoptysis.  GASTROINTESTINAL: No abdominal pain. No nausea. No vomiting. No hematemesis. resolved diarrhea. No constipation. No melena. No hematochezia.  GENITOURINARY: No dysuria. No frequency. No hesitancy. No hematuria.  NEUROLOGICAL: No numbness. No focal weakness. No incontinence. No headache.  BACK: No back pain.  EXTREMITIES: No lower extremity edema. Full ROM.  SKIN: No rashes. No itching. No other lesions.  PSYCHIATRIC: No depression. No anxiety. No SI/HI.  ALLERGIC: No lip swelling. No hives.  All other review of systems is negative unless indicated above.

## 2020-04-07 NOTE — ED PROVIDER NOTE - CARE PLAN
Principal Discharge DX:	COVID-19  Secondary Diagnosis:	NSTEMI (non-ST elevated myocardial infarction)  Secondary Diagnosis:	PNA (pneumonia)

## 2020-04-07 NOTE — ED PROVIDER NOTE - PHYSICAL EXAMINATION
VITALS: reviewed  GEN: NAD  HEAD/EYES: NCAT, PERRL, EOMI, anicteric sclerae, no conjunctival pallor  ENT: mucus membranes moist, oropharynx WNL, trachea midline  RESP: lungs CTA with equal breath sounds bilaterally, chest wall nontender and atraumatic  CV: heart with reg rhythm S1, S2, distal pulses intact and symmetric bilaterally. LUE AV fistula  ABDOMEN: soft, nondistended, nontender, no palpable masses  MSK: extremities atraumatic and nontender, LLE edema (chronic per old note) and ttp.   SKIN: warm, dry, no rash, no bruising, no cyanosis. color appropriate for ethnicity  NEURO: alert, mentating appropriately, no facial asymmetry.   PSYCH: Affect appropriate

## 2020-04-07 NOTE — H&P ADULT - PROBLEM SELECTOR PLAN 3
2/2 covid 19 pna  -Supplemental oxygen as required. Can escalate to NRB if required, would avoid bilevel support at this time.

## 2020-04-07 NOTE — ED PROVIDER NOTE - ATTENDING CONTRIBUTION TO CARE
64F, pmh esrd on m/t/th/sat dialysis, chf, copd, cva, hld, cad s/p stent, IDDM, biba with fever, cough, sob, gen fatigue x 1 week. seen here yesterday for the same reaseone, presumed covid on prior ct chest and cxr yesterday not swabbed, febrile, sob and weak today had hd as well, likely admission ftt, sats los 90s.

## 2020-04-07 NOTE — ED PROVIDER NOTE - PROGRESS NOTE DETAILS
ekg with stemi inferiorly with reciprocal changes lateral, cards called in the setting of likely covid, cardiac work up, tba, on AC.   maykel carlos Benito PGY3: appreciate cardiology c/s, as per cards rca re-stenosis area no amenable to cath, tba on tele. called Dr. Christianson and notified him of pt's EKG and admission, okay with plan.

## 2020-04-07 NOTE — H&P ADULT - PROBLEM SELECTOR PLAN 5
-s/p treatment today  -c/w sevelemer  -c/w midodrine  -renal eval in am, Primary team to discuss with Dr. Daisy Burger

## 2020-04-07 NOTE — ED ADULT NURSE REASSESSMENT NOTE - NS ED NURSE REASSESS COMMENT FT1
Pt. A&Ox2; disoriented to time. Pt has new EKG changed with ST elevations; given 81 mg ASA. Awaiting Troponin and dispo. VSS.

## 2020-04-07 NOTE — CONSULT NOTE ADULT - ASSESSMENT
63yo woman with HTN, HLD, DM, ESRD on HD with VENUS in inferior leads.    -She has known extensive CAD and ICM with EF 35% as per TTE 2019. s/p LHC 2/20/2019 revealed severe and diffuse instent restenosis along RCA --> Historically, Dr. Vela was unable to stent due to multiple layers of stenting and prior brachytherapy.   -Febrile illness with correlated b/l interstitial infiltrates on CXR raise suspicion for SARS-CoV-2 infection. She is currently PUI for COVID-19.   -Presently, she denies any active chest pain and is HD stable. EKG with VENUS in inferior leads could represent DDx of primary plaque rupture, myopericarditis, global myocardial injury from SARS-CoV-2. Cardiac enzymes are pending.  -Risk benefit ratio of emergent LHC in current situation is determined to be too high; would recommend continuation of medical management.  -Rest of cardiac management as per patient's primary cardiologist, Dr. Justyn Doty.    Case discussed with Dr. Bowens, cardiology interventionalist on call.    Renetta Oliver MD PGY-4  Cardiology Fellow  All Cardiology service information can be found 24/7 on [email or web address], password: [email or web address]  Note is preliminary until signed by the attending.

## 2020-04-07 NOTE — ED ADULT NURSE NOTE - NSIMPLEMENTINTERV_GEN_ALL_ED
Implemented All Fall Risk Interventions:  [place] to call system. Call bell, personal items and telephone within reach. Instruct patient to call for assistance. Room bathroom lighting operational. Non-slip footwear when patient is off stretcher. Physically safe environment: no spills, clutter or unnecessary equipment. Stretcher in lowest position, wheels locked, appropriate side rails in place. Provide visual cue, wrist band, yellow gown, etc. Monitor gait and stability. Monitor for mental status changes and reorient to person, place, and time. Review medications for side effects contributing to fall risk. Reinforce activity limits and safety measures with patient and family.

## 2020-04-07 NOTE — ED PROVIDER NOTE - OBJECTIVE STATEMENT
65 yo F hx HTN, HLD, DM1, ERSD on HD M/T/Th/S (finished HD today), CHf, COPD not on home O2, prior CVA w/o residual deficits, HLD, BIBA for fever, cough, ams, and decreased responsiveness s/p HD today (brought in from home). Pt c/o LLE pain. No cp. One diarrheal episode at home. No abdominal pain, n/v.

## 2020-04-07 NOTE — H&P ADULT - PROBLEM SELECTOR PLAN 2
-fever, tachycardia, tachypnea, hypotension, elevated lactate. source is covid19 pna  -c/w mgt as noted above.   -c/w midodrine 10 tid  -can give small volume bolus  -patient confirmed DNR/DNI; no clear benefit for pressor therapy in setting of active STEMI that is not being intervened on, septic shock, covid19 pna in patient who is former smoker and with copd, with other significant mobidities such as ESRD on dialysis.   -on my exam, patient is still mentating near baseline though a bit more encephalopathic from acute illness, with map ~65.

## 2020-04-07 NOTE — H&P ADULT - HISTORY OF PRESENT ILLNESS
64 F PMH CAD Sp PTCA w stents CHF,COPD, CVA, DMT1, ESRD on HD (M,Tu,Th,Sa), Gout, HLD, PVD, Sublcavian Stensosis (L) sp stent. PSH ASD Repair,idania,fem-pop bypass, recurrent admission for hyper/hypoglycemia/DKA, p/w fever, cough and encephalopathy.      VS: Tm 103.7, 122 -->118, 120/60 -->84/53, 24, 100% 3LNC.  Labs: no leukocytosis, chronic stable anemia, chronic macrocytosis, plt wnl, lymphopenia, INR 1.29, ddimer 927, elevated fibrinogen, cmp c/w ESRD, hyperglycemia 400s, AG 29, Lacate 4, vbg pH 7.39. . Procal ~7. CXR with b/l opacities. In Er pt received aspirin 81, heparin bolus and gtt for presumed ACS, plaquenil for covid, IV ctx + po azithro, solu-medrol 40 40 x 1, midodrine, oxy IR, and tylenol prior to medicine team involvement. 64 F PMH CAD Sp PTCA w stents CHF,COPD, CVA, DMT1, ESRD on HD (M,Tu,Th,Sa), Gout, HLD, PVD, Sublcavian Stensosis (L) sp stent. PSH ASD Repair,idania,fem-pop bypass, recurrent admission for hyper/hypoglycemia/DKA, p/w fever, cough and encephalopathy.  Pt say she has had cough and sob x 1 week or more. +fevers. +chills. Had full HD tx today, was found to be more confused afterwards. Also c/o LLE pain. Had one episode of diarrhea. Pt states meds have not changed since last admission, no new meds.   called for collateral, he confirms above details. Denies cp, n/v, jaw pain, back pain, abd pain.      VS: Tm 103.7, 122 -->118, 120/60 -->84/53, 24, 100% 3LNC.  Labs: no leukocytosis, chronic stable anemia, chronic macrocytosis, plt wnl, lymphopenia, INR 1.29, ddimer 927, elevated fibrinogen, cmp c/w ESRD, hyperglycemia 400s, AG 29, Lacate 4, vbg pH 7.39. . Procal ~7. CXR with b/l opacities. In Er pt received aspirin 81, heparin bolus and gtt for presumed ACS, plaquenil for covid, IV ctx + po azithro, solu-medrol 40 40 x 1, midodrine, oxy IR, and tylenol prior to medicine team involvement. 64 F PMH CAD Sp PTCA w stents CHF,COPD, CVA, DMT1, ESRD on HD (M,Tu,Th,Sa), Gout, HLD, PVD, Sublcavian Stensosis (L) sp stent. PSH ASD Repair,idania,fem-pop bypass, recurrent admission for hyper/hypoglycemia/DKA, p/w fever, cough and encephalopathy.  Pt say she has had cough and sob x 1 week or more. +fevers. +chills. Had full HD tx today, was found to be more confused afterwards. Also c/o LLE pain. Had one episode of diarrhea. Pt states meds have not changed since last admission, no new meds.   called for collateral, he confirms above details. Denies cp, n/v, jaw pain, back pain, abd pain.      VS: Tm 103.7, 122 -->118, 120/60 -->84/53, 24, 100% 3LNC.  Labs: no leukocytosis, chronic stable anemia, chronic macrocytosis, plt wnl, lymphopenia, INR 1.29, ddimer 927, elevated fibrinogen, cmp c/w ESRD, hyperglycemia 400s, AG 29, Lacate 4, vbg pH 7.39. . Procal ~7. CXR with b/l opacities. In Er pt received aspirin 81, heparin bolus and full dose gtt for presumed ACS/and elevate ddimer, plaquenil for covid, IV ctx + po azithro, solu-medrol 40 40 x 1, midodrine, oxy IR, and tylenol prior to medicine team involvement.

## 2020-04-07 NOTE — H&P ADULT - PROBLEM SELECTOR PLAN 4
- -patient presenting with VENUS inferior leads and reciprocal changes and initial . denies cp or palps.   -cardiology consult appreciated; patient historically with severe/diffuse in-stent restenosis along RCA which was difficult to re-stent in the past 2/2 multiple prior stents/brachytherapy. Per cardiology fellow/team, the r/b ratio of emergent LHC at this time while patient is in acute septic shock/hypoxic respiratory failure with covid10 pna and prior difficult stenting is too high.  Cardiology team recommending medical management in place of LHC and PCI.    -pt started on heparin gtt full dose in ER prior to medicine team involvement.  Will change dosing to ACS protocol, as the elevated ddimer is unlikely to represent true PE and rather is fairly typical of acute covid19 presentations.    -c/w aspirin, plavix, increase statin to atorva 80 (monitor LFT as pt has mild transaminitis likely viral/covid mediated), c/w bb  -f/u further cards recs  -TTE  -tele  -trend cardiac enzymes

## 2020-04-07 NOTE — H&P ADULT - PROBLEM SELECTOR PLAN 7
-20 minutes spent clarifying advanced directives with patient and /family ia phone.   -At this time, patient DOES NOT demonstrates capacity with insight into condition.  Pt's  as next of kin and son have made the following decision, based on patient's prior wishes.   -pt is CONFIRMED TO BE DNR/DNI. trial of IV fluids/abx acceptable.  -This conversation was witnessed on the phone by my colleague.  MOLST filled out, DNR/DNI order placed in [facility].    -All questions answered at bedside and on phone.

## 2020-04-07 NOTE — H&P ADULT - NSHPLABSRESULTS_GEN_ALL_CORE
Labs personally reviewed : no leukocytosis, chronic stable anemia, chronic macrocytosis, plt wnl, lymphopenia, INR 1.29, ddimer 927, elevated fibrinogen, cmp c/w ESRD, hyperglycemia 400s, AG 29, Lacate 4, vbg pH 7.39. . Procal ~7.   Imaging personally reviewed CXR with b/l opacities.   EKg personally reviewed inf stemi II/III/AVF with reciprocal changes. qtc 498.

## 2020-04-08 DIAGNOSIS — E10.10 TYPE 1 DIABETES MELLITUS WITH KETOACIDOSIS WITHOUT COMA: ICD-10-CM

## 2020-04-08 DIAGNOSIS — I10 ESSENTIAL (PRIMARY) HYPERTENSION: ICD-10-CM

## 2020-04-08 DIAGNOSIS — E78.5 HYPERLIPIDEMIA, UNSPECIFIED: ICD-10-CM

## 2020-04-08 LAB
ALBUMIN SERPL ELPH-MCNC: 3.9 G/DL — SIGNIFICANT CHANGE UP (ref 3.3–5)
ALP SERPL-CCNC: 207 U/L — HIGH (ref 40–120)
ALT FLD-CCNC: 46 U/L — HIGH (ref 10–45)
ANION GAP SERPL CALC-SCNC: 32 MMOL/L — HIGH (ref 5–17)
APTT BLD: 28 SEC — SIGNIFICANT CHANGE UP (ref 27.5–36.3)
AST SERPL-CCNC: 101 U/L — HIGH (ref 10–40)
BASOPHILS # BLD AUTO: 0.01 K/UL — SIGNIFICANT CHANGE UP (ref 0–0.2)
BASOPHILS NFR BLD AUTO: 0.1 % — SIGNIFICANT CHANGE UP (ref 0–2)
BILIRUB SERPL-MCNC: 0.3 MG/DL — SIGNIFICANT CHANGE UP (ref 0.2–1.2)
BUN SERPL-MCNC: 43 MG/DL — HIGH (ref 7–23)
CALCIUM SERPL-MCNC: 8.7 MG/DL — SIGNIFICANT CHANGE UP (ref 8.4–10.5)
CHLORIDE SERPL-SCNC: 82 MMOL/L — LOW (ref 96–108)
CK SERPL-CCNC: 354 U/L — HIGH (ref 25–170)
CO2 SERPL-SCNC: 19 MMOL/L — LOW (ref 22–31)
CREAT SERPL-MCNC: 4.35 MG/DL — HIGH (ref 0.5–1.3)
CRP SERPL-MCNC: 23.2 MG/DL — HIGH (ref 0–0.4)
CRP SERPL-MCNC: 30.19 MG/DL — HIGH (ref 0–0.4)
EOSINOPHIL # BLD AUTO: 0 K/UL — SIGNIFICANT CHANGE UP (ref 0–0.5)
EOSINOPHIL NFR BLD AUTO: 0 % — SIGNIFICANT CHANGE UP (ref 0–6)
FERRITIN SERPL-MCNC: HIGH NG/ML (ref 15–150)
GLUCOSE BLDC GLUCOMTR-MCNC: 109 MG/DL — HIGH (ref 70–99)
GLUCOSE BLDC GLUCOMTR-MCNC: 212 MG/DL — HIGH (ref 70–99)
GLUCOSE BLDC GLUCOMTR-MCNC: 243 MG/DL — HIGH (ref 70–99)
GLUCOSE BLDC GLUCOMTR-MCNC: 338 MG/DL — HIGH (ref 70–99)
GLUCOSE BLDC GLUCOMTR-MCNC: 487 MG/DL — CRITICAL HIGH (ref 70–99)
GLUCOSE BLDC GLUCOMTR-MCNC: 531 MG/DL — CRITICAL HIGH (ref 70–99)
GLUCOSE BLDC GLUCOMTR-MCNC: 557 MG/DL — CRITICAL HIGH (ref 70–99)
GLUCOSE SERPL-MCNC: 595 MG/DL — CRITICAL HIGH (ref 70–99)
HBA1C BLD-MCNC: 7.7 % — HIGH (ref 4–5.6)
HCT VFR BLD CALC: 29.3 % — LOW (ref 34.5–45)
HCT VFR BLD CALC: 30.4 % — LOW (ref 34.5–45)
HGB BLD-MCNC: 9 G/DL — LOW (ref 11.5–15.5)
HGB BLD-MCNC: 9 G/DL — LOW (ref 11.5–15.5)
IMM GRANULOCYTES NFR BLD AUTO: 0.6 % — SIGNIFICANT CHANGE UP (ref 0–1.5)
LACTATE SERPL-SCNC: 3.3 MMOL/L — HIGH (ref 0.7–2)
LYMPHOCYTES # BLD AUTO: 0.41 K/UL — LOW (ref 1–3.3)
LYMPHOCYTES # BLD AUTO: 5.9 % — LOW (ref 13–44)
MAGNESIUM SERPL-MCNC: 2.4 MG/DL — SIGNIFICANT CHANGE UP (ref 1.6–2.6)
MCHC RBC-ENTMCNC: 29.6 GM/DL — LOW (ref 32–36)
MCHC RBC-ENTMCNC: 30.7 GM/DL — LOW (ref 32–36)
MCHC RBC-ENTMCNC: 32.3 PG — SIGNIFICANT CHANGE UP (ref 27–34)
MCHC RBC-ENTMCNC: 32.8 PG — SIGNIFICANT CHANGE UP (ref 27–34)
MCV RBC AUTO: 106.9 FL — HIGH (ref 80–100)
MCV RBC AUTO: 109 FL — HIGH (ref 80–100)
MONOCYTES # BLD AUTO: 0.16 K/UL — SIGNIFICANT CHANGE UP (ref 0–0.9)
MONOCYTES NFR BLD AUTO: 2.3 % — SIGNIFICANT CHANGE UP (ref 2–14)
NEUTROPHILS # BLD AUTO: 6.28 K/UL — SIGNIFICANT CHANGE UP (ref 1.8–7.4)
NEUTROPHILS NFR BLD AUTO: 91.1 % — HIGH (ref 43–77)
NRBC # BLD: 0 /100 WBCS — SIGNIFICANT CHANGE UP (ref 0–0)
NRBC # BLD: 0 /100 WBCS — SIGNIFICANT CHANGE UP (ref 0–0)
PHOSPHATE SERPL-MCNC: 6.4 MG/DL — HIGH (ref 2.5–4.5)
PLATELET # BLD AUTO: 251 K/UL — SIGNIFICANT CHANGE UP (ref 150–400)
PLATELET # BLD AUTO: 267 K/UL — SIGNIFICANT CHANGE UP (ref 150–400)
POTASSIUM SERPL-MCNC: 5.2 MMOL/L — SIGNIFICANT CHANGE UP (ref 3.5–5.3)
POTASSIUM SERPL-SCNC: 5.2 MMOL/L — SIGNIFICANT CHANGE UP (ref 3.5–5.3)
PROT SERPL-MCNC: 7 G/DL — SIGNIFICANT CHANGE UP (ref 6–8.3)
RBC # BLD: 2.74 M/UL — LOW (ref 3.8–5.2)
RBC # BLD: 2.79 M/UL — LOW (ref 3.8–5.2)
RBC # FLD: 14.7 % — HIGH (ref 10.3–14.5)
RBC # FLD: 14.7 % — HIGH (ref 10.3–14.5)
SODIUM SERPL-SCNC: 133 MMOL/L — LOW (ref 135–145)
TROPONIN T, HIGH SENSITIVITY RESULT: 595 NG/L — HIGH (ref 0–51)
WBC # BLD: 6.9 K/UL — SIGNIFICANT CHANGE UP (ref 3.8–10.5)
WBC # BLD: 8.52 K/UL — SIGNIFICANT CHANGE UP (ref 3.8–10.5)
WBC # FLD AUTO: 6.9 K/UL — SIGNIFICANT CHANGE UP (ref 3.8–10.5)
WBC # FLD AUTO: 8.52 K/UL — SIGNIFICANT CHANGE UP (ref 3.8–10.5)

## 2020-04-08 PROCEDURE — 99222 1ST HOSP IP/OBS MODERATE 55: CPT

## 2020-04-08 PROCEDURE — 93971 EXTREMITY STUDY: CPT | Mod: 26

## 2020-04-08 PROCEDURE — 93010 ELECTROCARDIOGRAM REPORT: CPT

## 2020-04-08 RX ORDER — INSULIN LISPRO 100/ML
10 VIAL (ML) SUBCUTANEOUS ONCE
Refills: 0 | Status: COMPLETED | OUTPATIENT
Start: 2020-04-08 | End: 2020-04-08

## 2020-04-08 RX ORDER — ERYTHROPOIETIN 10000 [IU]/ML
10000 INJECTION, SOLUTION INTRAVENOUS; SUBCUTANEOUS ONCE
Refills: 0 | Status: COMPLETED | OUTPATIENT
Start: 2020-04-08 | End: 2020-04-08

## 2020-04-08 RX ORDER — GLUCAGON INJECTION, SOLUTION 0.5 MG/.1ML
1 INJECTION, SOLUTION SUBCUTANEOUS ONCE
Refills: 0 | Status: DISCONTINUED | OUTPATIENT
Start: 2020-04-08 | End: 2020-04-12

## 2020-04-08 RX ORDER — INSULIN LISPRO 100/ML
VIAL (ML) SUBCUTANEOUS
Refills: 0 | Status: DISCONTINUED | OUTPATIENT
Start: 2020-04-08 | End: 2020-04-08

## 2020-04-08 RX ORDER — ENOXAPARIN SODIUM 100 MG/ML
30 INJECTION SUBCUTANEOUS DAILY
Refills: 0 | Status: DISCONTINUED | OUTPATIENT
Start: 2020-04-08 | End: 2020-04-08

## 2020-04-08 RX ORDER — INSULIN LISPRO 100/ML
1 VIAL (ML) SUBCUTANEOUS
Refills: 0 | Status: DISCONTINUED | OUTPATIENT
Start: 2020-04-08 | End: 2020-04-09

## 2020-04-08 RX ORDER — INSULIN LISPRO 100/ML
9 VIAL (ML) SUBCUTANEOUS ONCE
Refills: 0 | Status: COMPLETED | OUTPATIENT
Start: 2020-04-08 | End: 2020-04-08

## 2020-04-08 RX ORDER — DEXTROSE 50 % IN WATER 50 %
12.5 SYRINGE (ML) INTRAVENOUS ONCE
Refills: 0 | Status: DISCONTINUED | OUTPATIENT
Start: 2020-04-08 | End: 2020-04-12

## 2020-04-08 RX ORDER — DEXTROSE 50 % IN WATER 50 %
25 SYRINGE (ML) INTRAVENOUS ONCE
Refills: 0 | Status: DISCONTINUED | OUTPATIENT
Start: 2020-04-08 | End: 2020-04-12

## 2020-04-08 RX ORDER — INSULIN GLARGINE 100 [IU]/ML
16 INJECTION, SOLUTION SUBCUTANEOUS AT BEDTIME
Refills: 0 | Status: DISCONTINUED | OUTPATIENT
Start: 2020-04-08 | End: 2020-04-11

## 2020-04-08 RX ORDER — HEPARIN SODIUM 5000 [USP'U]/ML
INJECTION INTRAVENOUS; SUBCUTANEOUS
Qty: 25000 | Refills: 0 | Status: DISCONTINUED | OUTPATIENT
Start: 2020-04-08 | End: 2020-04-08

## 2020-04-08 RX ORDER — HEPARIN SODIUM 5000 [USP'U]/ML
5000 INJECTION INTRAVENOUS; SUBCUTANEOUS EVERY 12 HOURS
Refills: 0 | Status: DISCONTINUED | OUTPATIENT
Start: 2020-04-08 | End: 2020-04-12

## 2020-04-08 RX ORDER — DEXTROSE 50 % IN WATER 50 %
15 SYRINGE (ML) INTRAVENOUS ONCE
Refills: 0 | Status: DISCONTINUED | OUTPATIENT
Start: 2020-04-08 | End: 2020-04-08

## 2020-04-08 RX ORDER — INSULIN LISPRO 100/ML
VIAL (ML) SUBCUTANEOUS
Refills: 0 | Status: DISCONTINUED | OUTPATIENT
Start: 2020-04-08 | End: 2020-04-12

## 2020-04-08 RX ORDER — SODIUM CHLORIDE 9 MG/ML
1000 INJECTION, SOLUTION INTRAVENOUS
Refills: 0 | Status: DISCONTINUED | OUTPATIENT
Start: 2020-04-08 | End: 2020-04-08

## 2020-04-08 RX ADMIN — Medication 81 MILLIGRAM(S): at 11:48

## 2020-04-08 RX ADMIN — HEPARIN SODIUM 3200 UNIT(S): 5000 INJECTION INTRAVENOUS; SUBCUTANEOUS at 09:07

## 2020-04-08 RX ADMIN — Medication 20 MILLIGRAM(S): at 16:22

## 2020-04-08 RX ADMIN — Medication 1: at 20:38

## 2020-04-08 RX ADMIN — PANTOPRAZOLE SODIUM 40 MILLIGRAM(S): 20 TABLET, DELAYED RELEASE ORAL at 06:22

## 2020-04-08 RX ADMIN — CLOPIDOGREL BISULFATE 75 MILLIGRAM(S): 75 TABLET, FILM COATED ORAL at 11:48

## 2020-04-08 RX ADMIN — Medication 2: at 18:03

## 2020-04-08 RX ADMIN — Medication 400 MILLIGRAM(S): at 08:35

## 2020-04-08 RX ADMIN — MIDODRINE HYDROCHLORIDE 10 MILLIGRAM(S): 2.5 TABLET ORAL at 06:22

## 2020-04-08 RX ADMIN — Medication 50 MILLIGRAM(S): at 06:22

## 2020-04-08 RX ADMIN — HEPARIN SODIUM 5000 UNIT(S): 5000 INJECTION INTRAVENOUS; SUBCUTANEOUS at 16:23

## 2020-04-08 RX ADMIN — Medication 9 UNIT(S): at 10:53

## 2020-04-08 RX ADMIN — SEVELAMER CARBONATE 800 MILLIGRAM(S): 2400 POWDER, FOR SUSPENSION ORAL at 08:36

## 2020-04-08 RX ADMIN — Medication 10 UNIT(S): at 06:21

## 2020-04-08 RX ADMIN — HEPARIN SODIUM 650 UNIT(S)/HR: 5000 INJECTION INTRAVENOUS; SUBCUTANEOUS at 01:49

## 2020-04-08 RX ADMIN — ZINC SULFATE TAB 220 MG (50 MG ZINC EQUIVALENT) 220 MILLIGRAM(S): 220 (50 ZN) TAB at 11:48

## 2020-04-08 RX ADMIN — Medication 20 MILLIGRAM(S): at 06:22

## 2020-04-08 RX ADMIN — Medication 5: at 14:40

## 2020-04-08 RX ADMIN — ERYTHROPOIETIN 10000 UNIT(S): 10000 INJECTION, SOLUTION INTRAVENOUS; SUBCUTANEOUS at 23:03

## 2020-04-08 RX ADMIN — Medication 4: at 16:21

## 2020-04-08 RX ADMIN — ATORVASTATIN CALCIUM 80 MILLIGRAM(S): 80 TABLET, FILM COATED ORAL at 20:07

## 2020-04-08 RX ADMIN — Medication 1 UNIT(S): at 20:38

## 2020-04-08 RX ADMIN — MIDODRINE HYDROCHLORIDE 10 MILLIGRAM(S): 2.5 TABLET ORAL at 16:22

## 2020-04-08 RX ADMIN — CEFTRIAXONE 100 MILLIGRAM(S): 500 INJECTION, POWDER, FOR SOLUTION INTRAMUSCULAR; INTRAVENOUS at 20:06

## 2020-04-08 RX ADMIN — HEPARIN SODIUM 800 UNIT(S)/HR: 5000 INJECTION INTRAVENOUS; SUBCUTANEOUS at 09:03

## 2020-04-08 RX ADMIN — MIDODRINE HYDROCHLORIDE 10 MILLIGRAM(S): 2.5 TABLET ORAL at 11:48

## 2020-04-08 RX ADMIN — Medication 6: at 08:35

## 2020-04-08 NOTE — CONSULT NOTE ADULT - SUBJECTIVE AND OBJECTIVE BOX
[facility]  [phone]  NEPHROLOGY      INITIAL CONSULT NOTE  --------------------------------------------------------------------------------  HPI:        64 F PMH CAD Sp PTCA w stents CHF,COPD, CVA, DMT1, ESRD on HD (M,Tu,Th,Sa), Gout, HLD, PVD, Sublcavian Stensosis (L) sp stent. PSH ASD Repair,idania,fem-pop bypass, recurrent admission for hyper/hypoglycemia/DKA, p/w fever, cough and encephalopathy.  Pt say she has had cough and sob x 1 week or more. +fevers. +chills. Had full HD tx today, was found to be more confused afterwards. Also c/o LLE pain. Had one episode of diarrhea. in er VS: Tm 103.7, 122 -->118, 120/60 -->84/53, 24, 100% 3LNC.  Labs: no leukocytosis, chronic stable anemia, chronic macrocytosis, plt wnl, lymphopenia, INR 1.29, ddimer 927, elevated fibrinogen, cmp c/w ESRD, hyperglycemia 400s, AG 29, Lacate 4, vbg pH 7.39. . Procal ~7. CXR with b/l opacities. pt received aspirin 81, heparin bolus and full dose gtt for presumed ACS/and elevate ddimer, plaquenil for covid, IV ctx + po azithro, solu-medrol 40 40 x 1, midodrine, oxy IR,   renal consult called. states she had not received hd yesterday and wants to have hd and has fluid onboard     PAST HISTORY  --------------------------------------------------------------------------------  PAST MEDICAL & SURGICAL HISTORY:  COPD (chronic obstructive pulmonary disease)  Localized enlarged lymph nodes  CHF (congestive heart failure): EF 40-45%  Subclavian vein stenosis, left: s/p stent  DKA, type 1: 1/2015  ACS (acute coronary syndrome): 1/2015 - cath revealed 100% ostial stenosis not amenable to PCI - medical management  TIA (transient ischemic attack): x 2 - 8-9 years ago prior to ASD/VSD repair  CAD (coronary artery disease): s/p stents  Gout: past  CVA (cerebral infarction): with no residual, 8 yrs ago, prior to heart surgery - ST memory loss  Peripheral vascular disease: occluded left fem-pop bypass 5/2015  Diabetes mellitus type 1: Insulin Dependent -  ESRD (end stage renal disease): dialysis  M, tue, thursday, saturday  Hyperlipidemia  Status post device closure of ASD: &quot;clamshell&quot;  History of cardiac catheterization: 1/2015 - no intervention  S/P femoral-popliteal bypass surgery: L and R in 2013 with graft; 5/2015 CFA angioplasty left and ileofemoral endarterectomywith vein patch angioplasty of left fem-pop bypass graft  Multiple vascular surgery both leg, left fempop bypass revision 11/2015  AV (arteriovenous fistula): Left AV graft; revision with stent placement 2-3 years ago  S/P cholecystectomy    FAMILY HISTORY:  Family history of smoking  Family history of hypertension  Family history of cancer (Sibling)    PAST SOCIAL HISTORY: past tobacco use     ALLERGIES & MEDICATIONS  --------------------------------------------------------------------------------  Allergies    No Known Allergies    Intolerances      Standing Inpatient Medications  aspirin enteric coated 81 milliGRAM(s) Oral daily  atorvastatin 80 milliGRAM(s) Oral at bedtime  cefTRIAXone   IVPB 1000 milliGRAM(s) IV Intermittent every 24 hours  clopidogrel Tablet 75 milliGRAM(s) Oral daily  dextrose 5%. 1000 milliLiter(s) IV Continuous <Continuous>  dextrose 50% Injectable 12.5 Gram(s) IV Push once  dextrose 50% Injectable 25 Gram(s) IV Push once  dextrose 50% Injectable 25 Gram(s) IV Push once  dextrose 50% Injectable 12.5 Gram(s) IV Push once  epoetin-azalea-epbx (RETACRIT) Injectable 44621 Unit(s) IV Push once  heparin  Injectable 5000 Unit(s) SubCutaneous every 12 hours  hydroxychloroquine   Oral   hydroxychloroquine 200 milliGRAM(s) Oral every 12 hours  insulin glargine Injectable (LANTUS) 16 Unit(s) SubCutaneous at bedtime  insulin lispro (HumaLOG) corrective regimen sliding scale   SubCutaneous three times a day before meals  insulin lispro Injectable (HumaLOG) 1 Unit(s) SubCutaneous before breakfast  insulin lispro Injectable (HumaLOG) 1 Unit(s) SubCutaneous before lunch  insulin lispro Injectable (HumaLOG) 1 Unit(s) SubCutaneous before dinner  methylPREDNISolone sodium succinate Injectable 20 milliGRAM(s) IV Push two times a day  metoprolol succinate ER 50 milliGRAM(s) Oral daily  midodrine. 10 milliGRAM(s) Oral three times a day  pantoprazole    Tablet 40 milliGRAM(s) Oral before breakfast  sevelamer carbonate 800 milliGRAM(s) Oral three times a day with meals  zinc sulfate 220 milliGRAM(s) Oral daily    PRN Inpatient Medications  acetaminophen   Tablet .. 650 milliGRAM(s) Oral every 4 hours PRN  ALBUTerol    90 MICROgram(s) HFA Inhaler 2 Puff(s) Inhalation every 4 hours PRN  benzonatate 100 milliGRAM(s) Oral three times a day PRN  dextrose 40% Gel 15 Gram(s) Oral once PRN  glucagon  Injectable 1 milliGRAM(s) IntraMuscular once PRN  glucagon  Injectable 1 milliGRAM(s) IntraMuscular once PRN  guaiFENesin   Syrup  (Sugar-Free) 200 milliGRAM(s) Oral every 6 hours PRN  ondansetron Injectable 4 milliGRAM(s) IV Push every 12 hours PRN      REVIEW OF SYSTEMS  --------------------------------------------------------------------------------  Gen:  +  fevers/chills   Skin: No rashes  Head/Eyes/Ears/Mouth: No headache; Normal hearing;  No sinus pain/discomfort, sore throat +   Respiratory:  +  dyspnea, + cough,  - wheezing, hemoptysis -   CV: No chest pain, or palp   GI: No abdominal pain, diarrhea, nausea, vomiting,   : No dysuria, hematuria, nocturia  MSK: No joint pain/swelling; no back pain  Neuro: No dizziness/lightheadedness,  also with +  edema     All other systems were reviewed and are negative, except as noted.    VITALS/PHYSICAL EXAM  --------------------------------------------------------------------------------  T(C): 36.5 (04-08-20 @ 20:43), Max: 37.2 (04-08-20 @ 14:47)  HR: 73 (04-08-20 @ 20:43) (73 - 82)  BP: 120/60 (04-08-20 @ 20:43) (106/60 - 121/76)  RR: 22 (04-08-20 @ 20:43) (20 - 22)  SpO2: 100% (04-08-20 @ 20:43) (100% - 100%)  Wt(kg): --  Height (cm): 162.56 (04-07-20 @ 16:43)  Weight (kg): 54.7 (04-07-20 @ 19:33)  BMI (kg/m2): 20.7 (04-07-20 @ 19:33)  BSA (m2): 1.58 (04-07-20 @ 19:33)      04-08-20 @ 07:01  -  04-08-20 @ 21:15  --------------------------------------------------------  IN: 240 mL / OUT: 0 mL / NET: 240 mL      Physical Exam:  	  due to covid 19 isolation exam reliability per primary team       LABS/STUDIES  --------------------------------------------------------------------------------              9.0    8.52  >-----------<  267      [04-08-20 @ 08:13]              29.3     133  |  82  |  43  ----------------------------<  595      [04-08-20 @ 07:02]  5.2   |  19  |  4.35        Ca     8.7     [04-08-20 @ 07:02]      Mg     2.4     [04-08-20 @ 07:02]      Phos  6.4     [04-08-20 @ 07:02]    TPro  7.0  /  Alb  3.9  /  TBili  0.3  /  DBili  x   /  AST  101  /  ALT  46  /  AlkPhos  207  [04-08-20 @ 07:02]    PT/INR: PT 14.8 , INR 1.29       [04-07-20 @ 18:06]  PTT: 28.0       [04-08-20 @ 08:13]          [04-08-20 @ 07:02]        [04-07-20 @ 18:06]    Creatinine Trend:  SCr 4.35 [04-08 @ 07:02]  SCr 3.35 [04-07 @ 18:06]  SCr 6.49 [04-06 @ 19:29]  SCr 6.32 [04-06 @ 17:15]  SCr 5.14 [03-18 @ 14:34]    Urinalysis - [06-04-17 @ 08:24]      Color Yellow / Appearance Clear / SG 1.013 / pH 8.5      Gluc 1000 / Ketone Negative  / Bili Negative / Urobili Negative       Blood Negative / Protein >600 / Leuk Est Small / Nitrite Negative      RBC 3-5 / WBC 6-10 / Hyaline  / Gran  / Sq Epi  / Non Sq Epi OCC / Bacteria       Iron 67, TIBC 234, %sat 29      [12-04-19 @ 08:38]  Ferritin 22465      [04-08-20 @ 08:18]  PTH -- (Ca 8.3)      [12-04-19 @ 08:38]   952  PTH -- (Ca 9.6)      [06-17-19 @ 18:06]   177  HbA1c 7.7      [04-08-20 @ 08:03]  TSH 1.78      [11-14-19 @ 09:15]  Lipid: chol 108, TG 76, HDL 57, LDL 36      [11-14-19 @ 09:16]    HBsAb <3.0      [01-03-20 @ 03:47]  HBsAb Nonreact      [11-14-19 @ 04:43]  HBsAg Nonreact      [01-03-20 @ 03:47]  HBcAb Nonreact      [01-03-20 @ 03:47]  HCV 0.15, Nonreact      [11-14-19 @ 04:43]      < from: Xray Chest 1 View AP/PA (04.07.20 @ 17:51) >    EXAM:  XR CHEST AP OR PA 1V                            PROCEDURE DATE:  04/07/2020            INTERPRETATION:  HISTORY: Shortness of breath and cough    TECHNIQUE: Portable frontal chest x-ray    COMPARISON: Chest x-ray from 4/5/2020    FINDINGS:    Redemonstration of bilateral peripheral opacities..  There is no pneumothorax. There are no pleural effusions.   The cardiomediastinal silhouette cannot be adequately assessed on this projection.    IMPRESSION:   Redemonstration of bilateral peripheral opacities, differential includes infectious consolidation such as COVID 19.                 MIA SORENSON M.D., RADIOLOGY RESIDENT    < end of copied text >

## 2020-04-08 NOTE — CONSULT NOTE ADULT - SUBJECTIVE AND OBJECTIVE BOX
Reason For Consult: T1DM    HPI: 64 F PMH CAD Sp PTCA w stents CHF,COPD, CVA, DMT1, ESRD on HD (M,Tu,Th,Sa), Gout, HLD, PVD, Sublcavian Stensosis (L) sp stent. PSH ASD Repair,idania,fem-pop bypass, recurrent admission for hyper/hypoglycemia/DKA, p/w fever, cough and encephalopathy.  Pt say she has had cough and sob x 1 week or more. +fevers. +chills. Had full HD tx today, was found to be more confused afterwards. Also c/o LLE pain. Had one episode of diarrhea. Pt states meds have not changed since last admission, no new meds.   called for collateral, he confirms above details. Denies cp, n/v, jaw pain, back pain, abd pain.      VS: Tm 103.7, 122 -->118, 120/60 -->84/53, 24, 100% 3LNC.  Labs: no leukocytosis, chronic stable anemia, chronic macrocytosis, plt wnl, lymphopenia, INR 1.29, ddimer 927, elevated fibrinogen, cmp c/w ESRD, hyperglycemia 400s, AG 29, Lacate 4, vbg pH 7.39. . Procal ~7. CXR with b/l opacities. In Er pt received aspirin 81, heparin bolus and full dose gtt for presumed ACS/and elevate ddimer, plaquenil for covid, IV ctx + po azithro, solu-medrol 40 40 x 1, midodrine, oxy IR, and tylenol prior to medicine team involvement. (07 Apr 2020 19:49)    Endocrine History:  Spoke with patient via telephone. She sounds very fatigued with conversational dyspnea. She has poor recall. States, she follows with Dr. Ley. Has been taking Lantus 13 and Humalog 3 units TID. As per last hospital discharge note on Feb 26, recommended for Lantus 13 and Humalog 3 units TID AC. Today, she admits that she ate her meal this morning. She admits due for HD today. Unable to obtain any other history from patient as she only provides 1 word answers given her fatigue, high anion gap metabolic acidosis. As per chart notes, there is concern for encephalopathy based on metabolic derangements.  H/o DM related complications as listed in PMH.    PAST MEDICAL & SURGICAL HISTORY:  COPD (chronic obstructive pulmonary disease)  Localized enlarged lymph nodes  CHF (congestive heart failure): EF 40-45%  Subclavian vein stenosis, left: s/p stent  DKA, type 1: 1/2015  ACS (acute coronary syndrome): 1/2015 - cath revealed 100% ostial stenosis not amenable to PCI - medical management  TIA (transient ischemic attack): x 2 - 8-9 years ago prior to ASD/VSD repair  CAD (coronary artery disease): s/p stents  Gout: past  CVA (cerebral infarction): with no residual, 8 yrs ago, prior to heart surgery - ST memory loss  Peripheral vascular disease: occluded left fem-pop bypass 5/2015  Diabetes mellitus type 1: Insulin Dependent -  ESRD (end stage renal disease): dialysis  M, tue, thursday, saturday  Hyperlipidemia  Status post device closure of ASD: &quot;clamshell&quot;  History of cardiac catheterization: 1/2015 - no intervention  S/P femoral-popliteal bypass surgery: L and R in 2013 with graft; 5/2015 CFA angioplasty left and ileofemoral endarterectomywith vein patch angioplasty of left fem-pop bypass graft  Multiple vascular surgery both leg, left fempop bypass revision 11/2015  AV (arteriovenous fistula): Left AV graft; revision with stent placement 2-3 years ago  S/P cholecystectomy      FAMILY HISTORY:  Family history of smoking  Family history of hypertension  Family history of cancer (Sibling)    Social History: Unable to obtain    Outpatient Medications: Lantus 13 units QHS and Humalog 3 units TID.     MEDICATIONS  (STANDING):  aspirin enteric coated 81 milliGRAM(s) Oral daily  atorvastatin 80 milliGRAM(s) Oral at bedtime  cefTRIAXone   IVPB 1000 milliGRAM(s) IV Intermittent every 24 hours  clopidogrel Tablet 75 milliGRAM(s) Oral daily  dextrose 5%. 1000 milliLiter(s) (50 mL/Hr) IV Continuous <Continuous>  dextrose 5%. 1000 milliLiter(s) (50 mL/Hr) IV Continuous <Continuous>  dextrose 50% Injectable 12.5 Gram(s) IV Push once  dextrose 50% Injectable 25 Gram(s) IV Push once  dextrose 50% Injectable 25 Gram(s) IV Push once  dextrose 50% Injectable 12.5 Gram(s) IV Push once  dextrose 50% Injectable 25 Gram(s) IV Push once  dextrose 50% Injectable 25 Gram(s) IV Push once  heparin  Injectable 5000 Unit(s) SubCutaneous every 12 hours  hydroxychloroquine   Oral   hydroxychloroquine 200 milliGRAM(s) Oral every 12 hours  insulin glargine Injectable (LANTUS) 13 Unit(s) SubCutaneous at bedtime  insulin lispro (HumaLOG) corrective regimen sliding scale   SubCutaneous at bedtime  insulin lispro (HumaLOG) corrective regimen sliding scale   SubCutaneous every 2 hours  methylPREDNISolone sodium succinate Injectable 20 milliGRAM(s) IV Push two times a day  metoprolol succinate ER 50 milliGRAM(s) Oral daily  midodrine. 10 milliGRAM(s) Oral three times a day  pantoprazole    Tablet 40 milliGRAM(s) Oral before breakfast  sevelamer carbonate 800 milliGRAM(s) Oral three times a day with meals  zinc sulfate 220 milliGRAM(s) Oral daily    MEDICATIONS  (PRN):  acetaminophen   Tablet .. 650 milliGRAM(s) Oral every 4 hours PRN Temp greater or equal to 38C (100.4F), Mild Pain (1 - 3), Moderate Pain (4 - 6), Severe Pain (7 - 10)  ALBUTerol    90 MICROgram(s) HFA Inhaler 2 Puff(s) Inhalation every 4 hours PRN Shortness of Breath and/or Wheezing  benzonatate 100 milliGRAM(s) Oral three times a day PRN Cough  dextrose 40% Gel 15 Gram(s) Oral once PRN Blood Glucose LESS THAN 70 milliGRAM(s)/deciliter  dextrose 40% Gel 15 Gram(s) Oral once PRN Blood Glucose LESS THAN 70 milliGRAM(s)/deciliter  glucagon  Injectable 1 milliGRAM(s) IntraMuscular once PRN Glucose LESS THAN 70 milligrams/deciliter  glucagon  Injectable 1 milliGRAM(s) IntraMuscular once PRN Glucose LESS THAN 70 milligrams/deciliter  guaiFENesin   Syrup  (Sugar-Free) 200 milliGRAM(s) Oral every 6 hours PRN Cough  ondansetron Injectable 4 milliGRAM(s) IV Push every 12 hours PRN Nausea and/or Vomiting      Allergies  No Known Allergies    Review of Systems:  UNABLE TO OBTAIN    PHYSICAL EXAM:  VITALS: T(C): 36.6 (04-08-20 @ 05:00)  T(F): 97.8 (04-08-20 @ 05:00), Max: 103.7 (04-07-20 @ 17:24)  HR: 79 (04-08-20 @ 05:00) (79 - 122)  BP: 120/71 (04-08-20 @ 05:00) (83/51 - 123/69)  RR:  (20 - 26)  SpO2:  (98% - 100%)  As per H&P on 04/08/20:    	GENERAL: appears older than stated age. chronically ill appearing. nad.   	HEAD:  Atraumatic, Normocephalic  	EYES: EOMI, PERRLA, conjunctiva and sclera clear  	ENMT: No tonsillar erythema, exudates, or enlargement; dry mucous membranes, poor dentition, No lesions  	NECK: Supple, No JVD, Normal thyroid  	CHEST/LUNG: coarse b/s bl, dec bs at bases. breathing through pursed lips.   	HEART: tachy rate and reg rhythm; No murmurs, rubs, or gallops. +LLE trace edema  	ABDOMEN: Soft, Nontender, Nondistended; Bowel sounds present no rebound/guarding  	EXTREMITIES:  2+ Peripheral Pulses, No clubbing, cyanosis  	LYMPH: No lymphadenopathy noted, no lymphangitis  	SKIN: No rashes or lesions no petechiae  NERVOUS SYSTEM:  Alert & Oriented X2-3, waxing/waning concentration; Motor Strength 5/5 B/L upper and lower extremities; no facial droop or dysarthria.      POCT Blood Glucose.: 531 mg/dL (04-08-20 @ 10:06) hum6+hum 9  POCT Blood Glucose.: 557 mg/dL (04-08-20 @ 05:37)  POCT Blood Glucose.: 487 mg/dL (04-08-20 @ 00:45) Lantus 13+h4, solumedrol 20    POCT Blood Glucose.: 488 mg/dL (04-07-20 @ 21:52)  POCT Blood Glucose.: 395 mg/dL (04-07-20 @ 17:00) Solumedrol 40                            9.0    8.52  )-----------( 267      ( 08 Apr 2020 08:13 )             29.3       04-08    133<L>  |  82<L>  |  43<H>  ----------------------------<  595<HH>  5.2   |  19<L>  |  4.35<H>    AG 32    EGFR if : 12<L>  EGFR if non : 10<L>    Ca    8.7      04-08  Mg     2.4     04-08  Phos  6.4     04-08    TPro  7.0  /  Alb  3.9  /  TBili  0.3  /  DBili  x   /  AST  101<H>  /  ALT  46<H>  /  AlkPhos  207<H>  04-08    Hemoglobin A1C, Whole Blood: 7.7 % <H> [4.0 - 5.6] (04-08-20 @ 08:03)  Hemoglobin A1C, Whole Blood: 8.2 % <H> [4.0 - 5.6] (01-18-20 @ 09:41)

## 2020-04-08 NOTE — PROGRESS NOTE ADULT - ASSESSMENT
64 F PMH CAD Sp PTCA w stents CHF,COPD, CVA, DMT1, ESRD on HD (M,Tu,Th,Sa), Gout, HLD, PVD, Sublcavian Stensosis (L) sp stent. PSH ASD Repair,idania,fem-pop bypass, recurrent admission for hyper/hypoglycemia/DKA, p/w fever, cough and encephalopathy, found to be in septic shock with hypoxic respiratory failure, acute STEMI suspected 2/2 possible in stent re-stenosis exacerbated by viral illness/tachycardia, and with hyperglycemia/elevated anion gap with compensated acidosis.     Pneumonia due to 2019 novel coronavirus.   - fever, cough, sob, encephalopathy; presents with septic shock and hypoxia, found with lymphopenia, elevated lactate/procal/ldh/ddimer, and cxr showing b/l opacities, COVID19 PCR  positive.   -airborne + contact isolation  -Supplemental oxygen as required. Can escalate to NRB if required, would avoid bilevel support at this time.  -check crp, LDH, ferritin, procalcitonin, d-dimer, coags, fibrinogen, cpk/trop, lactate  -ID eval   -c/w plaquenil qtc noted 498 would avoid azithro  -patient with concurrent copd; s/p solumedrol 40 iv x 1 in ER. unclear if true copd exacerbation, no significant wheeze but moving air poorly b/l.  Will continue 1mg/kg solu-medrol divided bid; solu-medrol 20 IV bid x 3 days and reassess  -MDI albuterol prn  -given elevated lactate and procal and multiple comorbidities + septic shock, seems reasonable to continue with empiric coverage of bacterial superinfection.  c/w ceftriaxone for now, hold azithro for elevated qtc  -check legionella  -zofran, robitussin, tessalon, tylenol prn.     Septic shock.  - fever, tachycardia, tachypnea, hypotension, elevated lactate. source is covid19 pna  -c/w mgt as noted above.   -c/w midodrine 10 tid  -can give small volume bolus  -patient confirmed DNR/DNI; no clear benefit for pressor therapy in setting of active STEMI that is not being intervened on, septic shock, covid19 pna in patient who is former smoker and with copd, with other significant mobidities such as ESRD on dialysis.   -encephalopathic from acute illness, with map ~65.     Acute respiratory failure with hypoxia.  - 2/2 covid 19 pna  - Supplemental oxygen as required. Can escalate to NRB if required, would avoid bilevel support at this time.     ST elevation myocardial infarction (STEMI), unspecified artery.   - patient presenting with VENUS inferior leads and reciprocal changes and initial . denies cp or palps.   -cardiology follow.    -pt started on heparin gtt full dose in ER prior to medicine team involvement.  Will change dosing to ACS protocol, as the elevated ddimer is unlikely to represent true PE and rather is fairly typical of acute covid19 presentations.    -c/w aspirin, plavix, increase statin to atorva 80 (monitor LFT as pt has mild transaminitis likely viral/covid mediated), c/w bb  -f/u further cards recs  -TTE  -tele  -trend cardiac enzymes.     ESRD on hemodialysis.   - s/p treatment today  -c/w sevelemer  -c/w midodrine  -renal follow.     Type 2 diabetes mellitus with hyperglycemia, with long-term current use of insulin.  -pt with fsg in 400s, and now on short course steroids for covid19/copd  -pt taking Lantus 8/humalog 4 premeal at home  -will increase basal bolus as follows, similar to prior hospitalization: Lantus 13, + HISS  -consider endo eval  -currently pt has compensated multifactorial met.acidosis; bicarb >18 and pH nearly normal.  AG Is noted 29 but with elevated lactate is likely multifactorial. Check BHB. Will dose basal insulin now and monitor AG.    Advanced care planning/counseling discussion.  - pt is CONFIRMED TO BE DNR/DNI. trial of IV fluids/abx acceptable.      Prophylactic measure.  Plan: on hep gtt.    Pierce Viera MD pager [phone]

## 2020-04-08 NOTE — CONSULT NOTE ADULT - ASSESSMENT
64 F PMH CAD Sp PTCA w stents CHF,COPD, CVA, DMT1, ESRD on HD (M,Tu,Th,Sa), Gout, HLD, PVD, Sublcavian Stensosis (L) sp stent. PSH ASD Repair,idania,fem-pop bypass, recurrent admission for hyper/hypoglycemia/DKA, p/w fever, cough       1- ESRD  2- COVID 19 +   3- viral pneumonia   4- hypotension       hd verbal consent obtained due to covid 19 witnessed and placed in chart  hd today   cont midodrine 10 mg tid   plaquenil 200 mg bid   will attempt lovenox   insulin for hyperglycemia   O2 supplement

## 2020-04-08 NOTE — PROGRESS NOTE ADULT - SUBJECTIVE AND OBJECTIVE BOX
Patient is a 64y old  Female who presents with a chief complaint of fever, cough, encephalopathy. (08 Apr 2020 12:32)      SUBJECTIVE / OVERNIGHT EVENTS: sick, weak  Review of Systems  unobtainable     MEDICATIONS  (STANDING):  aspirin enteric coated 81 milliGRAM(s) Oral daily  atorvastatin 80 milliGRAM(s) Oral at bedtime  cefTRIAXone   IVPB 1000 milliGRAM(s) IV Intermittent every 24 hours  clopidogrel Tablet 75 milliGRAM(s) Oral daily  dextrose 5%. 1000 milliLiter(s) (50 mL/Hr) IV Continuous <Continuous>  dextrose 50% Injectable 12.5 Gram(s) IV Push once  dextrose 50% Injectable 25 Gram(s) IV Push once  dextrose 50% Injectable 25 Gram(s) IV Push once  dextrose 50% Injectable 12.5 Gram(s) IV Push once  epoetin-azalea-epbx (RETACRIT) Injectable 28911 Unit(s) IV Push once  heparin  Injectable 5000 Unit(s) SubCutaneous every 12 hours  hydroxychloroquine   Oral   hydroxychloroquine 200 milliGRAM(s) Oral every 12 hours  insulin glargine Injectable (LANTUS) 16 Unit(s) SubCutaneous at bedtime  insulin lispro (HumaLOG) corrective regimen sliding scale   SubCutaneous three times a day before meals  insulin lispro Injectable (HumaLOG) 1 Unit(s) SubCutaneous before breakfast  insulin lispro Injectable (HumaLOG) 1 Unit(s) SubCutaneous before lunch  insulin lispro Injectable (HumaLOG) 1 Unit(s) SubCutaneous before dinner  methylPREDNISolone sodium succinate Injectable 20 milliGRAM(s) IV Push two times a day  metoprolol succinate ER 50 milliGRAM(s) Oral daily  midodrine. 10 milliGRAM(s) Oral three times a day  pantoprazole    Tablet 40 milliGRAM(s) Oral before breakfast  sevelamer carbonate 800 milliGRAM(s) Oral three times a day with meals  zinc sulfate 220 milliGRAM(s) Oral daily    MEDICATIONS  (PRN):  acetaminophen   Tablet .. 650 milliGRAM(s) Oral every 4 hours PRN Temp greater or equal to 38C (100.4F), Mild Pain (1 - 3), Moderate Pain (4 - 6), Severe Pain (7 - 10)  ALBUTerol    90 MICROgram(s) HFA Inhaler 2 Puff(s) Inhalation every 4 hours PRN Shortness of Breath and/or Wheezing  benzonatate 100 milliGRAM(s) Oral three times a day PRN Cough  dextrose 40% Gel 15 Gram(s) Oral once PRN Blood Glucose LESS THAN 70 milliGRAM(s)/deciliter  glucagon  Injectable 1 milliGRAM(s) IntraMuscular once PRN Glucose LESS THAN 70 milligrams/deciliter  glucagon  Injectable 1 milliGRAM(s) IntraMuscular once PRN Glucose LESS THAN 70 milligrams/deciliter  guaiFENesin   Syrup  (Sugar-Free) 200 milliGRAM(s) Oral every 6 hours PRN Cough  ondansetron Injectable 4 milliGRAM(s) IV Push every 12 hours PRN Nausea and/or Vomiting      Vital Signs Last 24 Hrs  T(C): 36.5 (08 Apr 2020 20:43), Max: 37.2 (08 Apr 2020 14:47)  T(F): 97.7 (08 Apr 2020 20:43), Max: 98.9 (08 Apr 2020 14:47)  HR: 73 (08 Apr 2020 20:43) (73 - 82)  BP: 120/60 (08 Apr 2020 20:43) (106/60 - 121/76)  BP(mean): --  RR: 22 (08 Apr 2020 20:43) (20 - 22)  SpO2: 100% (08 Apr 2020 20:43) (100% - 100%)    PHYSICAL EXAM:  GENERAL: sick  HEAD:  Atraumatic, Normocephalic  EYES: EOMI, PERRLA, conjunctiva and sclera clear  NECK: Supple, No JVD  CHEST/LUNG: Clear to auscultation bilaterally; No wheeze  HEART: Regular rate and rhythm; No murmurs, rubs, or gallops  ABDOMEN: Soft, Nontender, Nondistended; Bowel sounds present  EXTREMITIES:  2+ Peripheral Pulses, No clubbing, cyanosis, or edema  PSYCH: AAOx3  NEUROLOGY: non-focal  SKIN: No rashes or lesions    LABS:                        9.0    8.52  )-----------( 267      ( 08 Apr 2020 08:13 )             29.3     04-08    133<L>  |  82<L>  |  43<H>  ----------------------------<  595<HH>  5.2   |  19<L>  |  4.35<H>    Ca    8.7      08 Apr 2020 07:02  Phos  6.4     04-08  Mg     2.4     04-08    TPro  7.0  /  Alb  3.9  /  TBili  0.3  /  DBili  x   /  AST  101<H>  /  ALT  46<H>  /  AlkPhos  207<H>  04-08    PT/INR - ( 07 Apr 2020 18:06 )   PT: 14.8 sec;   INR: 1.29 ratio         PTT - ( 08 Apr 2020 08:13 )  PTT:28.0 sec  CARDIAC MARKERS ( 08 Apr 2020 07:02 )  x     / x     / 354 U/L / x     / x      CARDIAC MARKERS ( 07 Apr 2020 18:06 )  x     / x     / 135 U/L / x     / 3.8 ng/mL            RADIOLOGY & ADDITIONAL TESTS:    Imaging Personally Reviewed:  < from: [facility] Duplex Lower Ext Vein Scan, Left (04.08.20 @ 19:35) >  IMPRESSION:     No evidence of left lower extremity deep venous thrombosis.    < end of copied text >  < from: Xray Chest 1 View AP/PA (04.07.20 @ 17:51) >    IMPRESSION:   Redemonstration of bilateral peripheral opacities, differential includes infectious consolidation such as COVID 19.     < end of copied text >    Consultant(s) Notes Reviewed:      Care Discussed with Consultants/Other Providers:

## 2020-04-08 NOTE — CHART NOTE - NSCHARTNOTEFT_GEN_A_CORE
Called by RN as FS at 7:30 am elevated >500. 9 units insulin lispro given.    FS repeated at 10 am 531, 9 more units insulin lispro ordered.    Case DW Endocrinology. Keep NPO for now, low dose sliding scale q 2 hours ordered.    Await official Endocrinology evaluation.

## 2020-04-08 NOTE — CHART NOTE - NSCHARTNOTEFT_GEN_A_CORE
Case discussed with Dr. Daisy Burger (renal) and Dr. Doty (cardiology)    Unlikely acute STEMI, no need for heparin gtt. Will stop heparin gtt at this time and will start Lovenox 20 mg daily for VTE ppx which is preferred on Covid 19 patients as per latest administration recommendations.    Ana Tejada PA-C Case discussed with Dr. Daisy Burger (renal) and Dr. Doty (cardiology)    Unlikely acute STEMI, no need for heparin gtt. Will stop heparin gtt at this time and will start Lovenox 20 mg daily for VTE ppx which is preferred on Covid 19 patients as per latest administration recommendations.    Ana Tejada PA-C [id number] Case discussed with Dr. Daisy Burger (renal) and Dr. Doty (cardiology)    Unlikely acute STEMI, no need for heparin gtt. Will stop heparin gtt at this time and will start heparin sq 5000 units BID for VTE ppx.    Ana Tejada PA-C [id number]

## 2020-04-08 NOTE — CONSULT NOTE ADULT - PROBLEM SELECTOR RECOMMENDATION 3
- BP is well controlled, goal <130/80  - Continue Metoprolol    D/w team    Carolina Nixon DO  Pager 9AM-5PM: [phone]  Pager Nights and Weekends: [phone]

## 2020-04-08 NOTE — CONSULT NOTE ADULT - PROBLEM SELECTOR RECOMMENDATION 9
- FS goal for this patient with brittle DM is   - Acute glucotoxicity due to steroid induced hyperglycemia and COVID sepsis  - Patient was given Lantus 13+hum4 last night  - She was given Humalog 6+9 this morning for +  - She admits to eating breakfast this morning  - Given risk of worsening DKA in the setting of high AG acidosis, recommend maintain NPO status for next 4-6 hours until gap and glucose is better  - Recommend Humalog low SSI (not custom low) q2 hours for now  - Once FS<250, will resume diet and adjust insulin doses pre-meal and coverage  - Recommend Lantus 16 units QHS given steroid use  - IF STEROIDS ARE ADJUSTED, PLEASE NOTIFY OUR TEAM  - Outpatient plan to be determined. Seven is Dr. Ley    Patient with poor prognosis and high risk for decompensation and death. Noted in chart patient is DNR/DNI

## 2020-04-08 NOTE — CONSULT NOTE ADULT - SUBJECTIVE AND OBJECTIVE BOX
CHIEF COMPLAINT: sob    HISTORY OF PRESENT ILLNESS:  64 F PMH CAD Sp PTCA w stents CHF,COPD, CVA, DMT1, ESRD on HD (M,Tu,Th,Sa), Gout, HLD, PVD, Sublcavian Stensosis (L) sp stent. PSH ASD Repair,idania,fem-pop bypass, recurrent admission for hyper/hypoglycemia/DKA, p/w fever, cough and encephalopathy.  Pt say she has had cough and sob x 1 week or more. +fevers. +chills. Had full HD tx today, was found to be more confused afterwards. Also c/o LLE pain. Had one episode of diarrhea. Pt states meds have not changed since last admission, no new meds.   called for collateral, he confirms above details. Denies cp, n/v, jaw pain, back pain, abd pain.      VS: Tm 103.7, 122 -->118, 120/60 -->84/53, 24, 100% 3LNC.  Labs: no leukocytosis, chronic stable anemia, chronic macrocytosis, plt wnl, lymphopenia, INR 1.29, ddimer 927, elevated fibrinogen, cmp c/w ESRD, hyperglycemia 400s, AG 29, Lacate 4, vbg pH 7.39. . Procal ~7. CXR with b/l opacities. In Er pt received aspirin 81, heparin bolus and full dose gtt for presumed ACS/and elevate ddimer, plaquenil for covid, IV ctx + po azithro, solu-medrol 40 40 x 1, midodrine, oxy IR, and tylenol prior to medicine team involvement.       Allergies  No Known Allergies        MEDICATIONS:  aspirin enteric coated 81 milliGRAM(s) Oral daily  clopidogrel Tablet 75 milliGRAM(s) Oral daily  heparin  Infusion.  Unit(s)/Hr IV Continuous <Continuous>  heparin  Injectable 3200 Unit(s) IV Push every 6 hours PRN  metoprolol succinate ER 50 milliGRAM(s) Oral daily  midodrine. 10 milliGRAM(s) Oral three times a day  cefTRIAXone   IVPB 1000 milliGRAM(s) IV Intermittent every 24 hours  hydroxychloroquine   Oral   hydroxychloroquine 400 milliGRAM(s) Oral every 12 hours  hydroxychloroquine 200 milliGRAM(s) Oral every 12 hours  ALBUTerol    90 MICROgram(s) HFA Inhaler 2 Puff(s) Inhalation every 4 hours PRN  benzonatate 100 milliGRAM(s) Oral three times a day PRN  guaiFENesin   Syrup  (Sugar-Free) 200 milliGRAM(s) Oral every 6 hours PRN  acetaminophen   Tablet .. 650 milliGRAM(s) Oral every 4 hours PRN  ondansetron Injectable 4 milliGRAM(s) IV Push every 12 hours PRN  pantoprazole    Tablet 40 milliGRAM(s) Oral before breakfast  atorvastatin 80 milliGRAM(s) Oral at bedtime  dextrose 40% Gel 15 Gram(s) Oral once PRN  dextrose 50% Injectable 12.5 Gram(s) IV Push once  dextrose 50% Injectable 25 Gram(s) IV Push once  dextrose 50% Injectable 25 Gram(s) IV Push once  glucagon  Injectable 1 milliGRAM(s) IntraMuscular once PRN  insulin glargine Injectable (LANTUS) 13 Unit(s) SubCutaneous at bedtime  insulin lispro (HumaLOG) corrective regimen sliding scale   SubCutaneous three times a day before meals  insulin lispro (HumaLOG) corrective regimen sliding scale   SubCutaneous at bedtime  methylPREDNISolone sodium succinate Injectable 20 milliGRAM(s) IV Push two times a day  dextrose 5%. 1000 milliLiter(s) IV Continuous <Continuous>  zinc sulfate 220 milliGRAM(s) Oral daily      PAST MEDICAL & SURGICAL HISTORY:  COPD (chronic obstructive pulmonary disease)  Localized enlarged lymph nodes  CHF (congestive heart failure): EF 40-45%  Subclavian vein stenosis, left: s/p stent  DKA, type 1: 1/2015  ACS (acute coronary syndrome): 1/2015 - cath revealed 100% ostial stenosis not amenable to PCI - medical management  TIA (transient ischemic attack): x 2 - 8-9 years ago prior to ASD/VSD repair  CAD (coronary artery disease): s/p stents  Gout: past  CVA (cerebral infarction): with no residual, 8 yrs ago, prior to heart surgery - ST memory loss  Peripheral vascular disease: occluded left fem-pop bypass 5/2015  Diabetes mellitus type 1: Insulin Dependent -  ESRD (end stage renal disease): dialysis  M, tue, thursday, saturday  Hyperlipidemia  Status post device closure of ASD: &quot;clamshell&quot;  History of cardiac catheterization: 1/2015 - no intervention  S/P femoral-popliteal bypass surgery: L and R in 2013 with graft; 5/2015 CFA angioplasty left and ileofemoral endarterectomywith vein patch angioplasty of left fem-pop bypass graft  Multiple vascular surgery both leg, left fempop bypass revision 11/2015  AV (arteriovenous fistula): Left AV graft; revision with stent placement 2-3 years ago  S/P cholecystectomy      FAMILY HISTORY:  Family history of smoking  Family history of hypertension  Family history of cancer (Sibling)      SOCIAL HISTORY:    former smoker. independent in adl      REVIEW OF SYSTEMS:  See HPI, otherwise complete 10 point review of systems negative    [ ] All others negative	      PHYSICAL EXAM:  T(C): 36.6 (04-08-20 @ 05:00), Max: 39.8 (04-07-20 @ 17:24)  HR: 79 (04-08-20 @ 05:00) (79 - 122)  BP: 120/71 (04-08-20 @ 05:00) (83/51 - 123/69)  RR: 20 (04-08-20 @ 05:00) (20 - 26)  SpO2: 100% (04-08-20 @ 05:00) (98% - 100%)  Wt(kg): --  I&O's Summary        Laboratory Data:	 	    CBC Full  -  ( 08 Apr 2020 07:03 )  WBC Count : 6.90 K/uL  Hemoglobin : 9.0 g/dL  Hematocrit : 30.4 %  Platelet Count - Automated : 251 K/uL  Mean Cell Volume : 109.0 fl  Mean Cell Hemoglobin : 32.3 pg  Mean Cell Hemoglobin Concentration : 29.6 gm/dL  Auto Neutrophil # : 6.28 K/uL  Auto Lymphocyte # : 0.41 K/uL  Auto Monocyte # : 0.16 K/uL  Auto Eosinophil # : 0.00 K/uL  Auto Basophil # : 0.01 K/uL  Auto Neutrophil % : 91.1 %  Auto Lymphocyte % : 5.9 %  Auto Monocyte % : 2.3 %  Auto Eosinophil % : 0.0 %  Auto Basophil % : 0.1 %    04-08    133<L>  |  82<L>  |  43<H>  ----------------------------<  595<HH>  5.2   |  19<L>  |  4.35<H>  04-07    137  |  87<L>  |  18  ----------------------------<  424<H>  4.6   |  21<L>  |  3.35<H>    Ca    8.7      08 Apr 2020 07:02  Ca    8.8      07 Apr 2020 18:06  Phos  6.4     04-08  Mg     2.4     04-08  Mg     2.5     04-06    TPro  7.0  /  Alb  3.9  /  TBili  0.3  /  DBili  x   /  AST  101<H>  /  ALT  46<H>  /  AlkPhos  207<H>  04-08  TPro  6.7  /  Alb  3.6  /  TBili  0.6  /  DBili  x   /  AST  55<H>  /  ALT  23  /  AlkPhos  183<H>  04-07            ekg nsr inf jordana nonspecific st changes      Assessment:  -elevated troponin  -possible acute inf stemi  -novel coronavirus/sars-cov2 infection  -NSVT  -ischemic cardiomyopathy c/b mod to severe LV dysfunction, cad s/p multiple stents  -esrd on hd  -PAD s/p prior intervention       Recs:  -would expect greater degree of troponin elevation if truly acute inf stemi and patient without any ischemic sx at present. a/w holding off on [facility] for now given overall poor prognosis, unlikely to  at this time. would treat medically with anti-platelet agents and heparin gtt  -c/w supportive care for covid-19. not a candidate for hydroxychloroquine due to prolonged qtc and ischemic heart disease and hx of ventricular arrhtyhmias  -resume gdmt for lv dysfunction as bp allows. currently on midodrine  -known extensive CAD and ICM. s/p [facility] 2/20/2019 --> severe and diffuse instent restenosis along RCA --> unable to stent due to multiple layers of stenting and prior brachytherapy. denies any true ischemic sx at present  -s/p TTE 2019: mod-severe MR, pseudo AS (low flow-low gradient), mod-severe LV dysfunction --> plan is for medical management given surgical risk and patient preference  -c/w asa, plavix, statin for ischemic cardiomyopathy/CAD/PAD      Greater than 60 minutes spent on total encounter; more than 50% of the visit was spent counseling and/or coordinating care by the attending physician.   	  Julián Biswas MD   Cardiovascular Diseases  [phone]

## 2020-04-08 NOTE — CONSULT NOTE ADULT - ASSESSMENT
63 y/o female with poorly controlled brittle T1DM (A1c 7.7%) complicated with ESRD on HD, CAD, TIA, PAD here with severe COVID-19 infection, lactic acidosis, high anion gap metabolic acidosis, and severe hyperglycemia in the setting of steroids. Patient also with COPD, HTN, HLD.

## 2020-04-09 LAB
ALBUMIN SERPL ELPH-MCNC: 3.1 G/DL — LOW (ref 3.3–5)
ALP SERPL-CCNC: 171 U/L — HIGH (ref 40–120)
ALT FLD-CCNC: 36 U/L — SIGNIFICANT CHANGE UP (ref 10–45)
ANION GAP SERPL CALC-SCNC: 20 MMOL/L — HIGH (ref 5–17)
AST SERPL-CCNC: 48 U/L — HIGH (ref 10–40)
BASOPHILS # BLD AUTO: 0.01 K/UL — SIGNIFICANT CHANGE UP (ref 0–0.2)
BASOPHILS NFR BLD AUTO: 0.1 % — SIGNIFICANT CHANGE UP (ref 0–2)
BILIRUB SERPL-MCNC: 0.3 MG/DL — SIGNIFICANT CHANGE UP (ref 0.2–1.2)
BUN SERPL-MCNC: 32 MG/DL — HIGH (ref 7–23)
CALCIUM SERPL-MCNC: 9.2 MG/DL — SIGNIFICANT CHANGE UP (ref 8.4–10.5)
CHLORIDE SERPL-SCNC: 94 MMOL/L — LOW (ref 96–108)
CO2 SERPL-SCNC: 26 MMOL/L — SIGNIFICANT CHANGE UP (ref 22–31)
CREAT SERPL-MCNC: 3.23 MG/DL — HIGH (ref 0.5–1.3)
D DIMER BLD IA.RAPID-MCNC: 783 NG/ML DDU — HIGH
EOSINOPHIL # BLD AUTO: 0 K/UL — SIGNIFICANT CHANGE UP (ref 0–0.5)
EOSINOPHIL NFR BLD AUTO: 0 % — SIGNIFICANT CHANGE UP (ref 0–6)
FERRITIN SERPL-MCNC: 6405 NG/ML — HIGH (ref 15–150)
GLUCOSE BLDC GLUCOMTR-MCNC: 128 MG/DL — HIGH (ref 70–99)
GLUCOSE BLDC GLUCOMTR-MCNC: 232 MG/DL — HIGH (ref 70–99)
GLUCOSE BLDC GLUCOMTR-MCNC: 287 MG/DL — HIGH (ref 70–99)
GLUCOSE BLDC GLUCOMTR-MCNC: 311 MG/DL — HIGH (ref 70–99)
GLUCOSE BLDC GLUCOMTR-MCNC: 374 MG/DL — HIGH (ref 70–99)
GLUCOSE SERPL-MCNC: 239 MG/DL — HIGH (ref 70–99)
HBV SURFACE AB SER-ACNC: <3 MIU/ML — LOW
HBV SURFACE AG SER-ACNC: SIGNIFICANT CHANGE UP
HCT VFR BLD CALC: 28.5 % — LOW (ref 34.5–45)
HGB BLD-MCNC: 8.8 G/DL — LOW (ref 11.5–15.5)
IMM GRANULOCYTES NFR BLD AUTO: 0.5 % — SIGNIFICANT CHANGE UP (ref 0–1.5)
LDH SERPL L TO P-CCNC: 393 U/L — HIGH (ref 50–242)
LYMPHOCYTES # BLD AUTO: 0.21 K/UL — LOW (ref 1–3.3)
LYMPHOCYTES # BLD AUTO: 1.7 % — LOW (ref 13–44)
MAGNESIUM SERPL-MCNC: 2.1 MG/DL — SIGNIFICANT CHANGE UP (ref 1.6–2.6)
MCHC RBC-ENTMCNC: 30.9 GM/DL — LOW (ref 32–36)
MCHC RBC-ENTMCNC: 32.1 PG — SIGNIFICANT CHANGE UP (ref 27–34)
MCV RBC AUTO: 104 FL — HIGH (ref 80–100)
MONOCYTES # BLD AUTO: 0.35 K/UL — SIGNIFICANT CHANGE UP (ref 0–0.9)
MONOCYTES NFR BLD AUTO: 2.9 % — SIGNIFICANT CHANGE UP (ref 2–14)
NEUTROPHILS # BLD AUTO: 11.46 K/UL — HIGH (ref 1.8–7.4)
NEUTROPHILS NFR BLD AUTO: 94.8 % — HIGH (ref 43–77)
NRBC # BLD: 0 /100 WBCS — SIGNIFICANT CHANGE UP (ref 0–0)
PHOSPHATE SERPL-MCNC: 4.6 MG/DL — HIGH (ref 2.5–4.5)
PLATELET # BLD AUTO: 284 K/UL — SIGNIFICANT CHANGE UP (ref 150–400)
POTASSIUM SERPL-MCNC: 3.9 MMOL/L — SIGNIFICANT CHANGE UP (ref 3.5–5.3)
POTASSIUM SERPL-SCNC: 3.9 MMOL/L — SIGNIFICANT CHANGE UP (ref 3.5–5.3)
PROCALCITONIN SERPL-MCNC: 22.55 NG/ML — HIGH (ref 0.02–0.1)
PROT SERPL-MCNC: 6 G/DL — SIGNIFICANT CHANGE UP (ref 6–8.3)
RBC # BLD: 2.74 M/UL — LOW (ref 3.8–5.2)
RBC # FLD: 14.6 % — HIGH (ref 10.3–14.5)
SODIUM SERPL-SCNC: 140 MMOL/L — SIGNIFICANT CHANGE UP (ref 135–145)
WBC # BLD: 12.09 K/UL — HIGH (ref 3.8–10.5)
WBC # FLD AUTO: 12.09 K/UL — HIGH (ref 3.8–10.5)

## 2020-04-09 PROCEDURE — 99231 SBSQ HOSP IP/OBS SF/LOW 25: CPT

## 2020-04-09 RX ORDER — INSULIN LISPRO 100/ML
4 VIAL (ML) SUBCUTANEOUS
Refills: 0 | Status: DISCONTINUED | OUTPATIENT
Start: 2020-04-09 | End: 2020-04-10

## 2020-04-09 RX ORDER — INSULIN LISPRO 100/ML
VIAL (ML) SUBCUTANEOUS
Refills: 0 | Status: DISCONTINUED | OUTPATIENT
Start: 2020-04-09 | End: 2020-04-12

## 2020-04-09 RX ORDER — INSULIN LISPRO 100/ML
5 VIAL (ML) SUBCUTANEOUS
Refills: 0 | Status: DISCONTINUED | OUTPATIENT
Start: 2020-04-09 | End: 2020-04-10

## 2020-04-09 RX ORDER — OXYCODONE HYDROCHLORIDE 5 MG/1
5 TABLET ORAL ONCE
Refills: 0 | Status: DISCONTINUED | OUTPATIENT
Start: 2020-04-09 | End: 2020-04-09

## 2020-04-09 RX ORDER — LANOLIN ALCOHOL/MO/W.PET/CERES
3 CREAM (GRAM) TOPICAL ONCE
Refills: 0 | Status: DISCONTINUED | OUTPATIENT
Start: 2020-04-09 | End: 2020-04-12

## 2020-04-09 RX ORDER — ACETAMINOPHEN 500 MG
1000 TABLET ORAL ONCE
Refills: 0 | Status: COMPLETED | OUTPATIENT
Start: 2020-04-09 | End: 2020-04-09

## 2020-04-09 RX ORDER — INSULIN LISPRO 100/ML
VIAL (ML) SUBCUTANEOUS AT BEDTIME
Refills: 0 | Status: DISCONTINUED | OUTPATIENT
Start: 2020-04-09 | End: 2020-04-12

## 2020-04-09 RX ADMIN — MIDODRINE HYDROCHLORIDE 10 MILLIGRAM(S): 2.5 TABLET ORAL at 12:27

## 2020-04-09 RX ADMIN — Medication 20 MILLIGRAM(S): at 16:51

## 2020-04-09 RX ADMIN — SEVELAMER CARBONATE 800 MILLIGRAM(S): 2400 POWDER, FOR SUSPENSION ORAL at 12:28

## 2020-04-09 RX ADMIN — INSULIN GLARGINE 16 UNIT(S): 100 INJECTION, SOLUTION SUBCUTANEOUS at 22:21

## 2020-04-09 RX ADMIN — PANTOPRAZOLE SODIUM 40 MILLIGRAM(S): 20 TABLET, DELAYED RELEASE ORAL at 04:52

## 2020-04-09 RX ADMIN — CEFTRIAXONE 100 MILLIGRAM(S): 500 INJECTION, POWDER, FOR SOLUTION INTRAMUSCULAR; INTRAVENOUS at 22:22

## 2020-04-09 RX ADMIN — CLOPIDOGREL BISULFATE 75 MILLIGRAM(S): 75 TABLET, FILM COATED ORAL at 12:28

## 2020-04-09 RX ADMIN — ZINC SULFATE TAB 220 MG (50 MG ZINC EQUIVALENT) 220 MILLIGRAM(S): 220 (50 ZN) TAB at 12:27

## 2020-04-09 RX ADMIN — Medication 20 MILLIGRAM(S): at 04:50

## 2020-04-09 RX ADMIN — ATORVASTATIN CALCIUM 80 MILLIGRAM(S): 80 TABLET, FILM COATED ORAL at 22:22

## 2020-04-09 RX ADMIN — Medication 2: at 08:49

## 2020-04-09 RX ADMIN — Medication 400 MILLIGRAM(S): at 03:19

## 2020-04-09 RX ADMIN — Medication 1 UNIT(S): at 12:26

## 2020-04-09 RX ADMIN — SEVELAMER CARBONATE 800 MILLIGRAM(S): 2400 POWDER, FOR SUSPENSION ORAL at 17:18

## 2020-04-09 RX ADMIN — INSULIN GLARGINE 16 UNIT(S): 100 INJECTION, SOLUTION SUBCUTANEOUS at 00:40

## 2020-04-09 RX ADMIN — Medication 81 MILLIGRAM(S): at 12:27

## 2020-04-09 RX ADMIN — Medication 5 UNIT(S): at 17:21

## 2020-04-09 RX ADMIN — MIDODRINE HYDROCHLORIDE 10 MILLIGRAM(S): 2.5 TABLET ORAL at 04:52

## 2020-04-09 RX ADMIN — OXYCODONE HYDROCHLORIDE 5 MILLIGRAM(S): 5 TABLET ORAL at 21:40

## 2020-04-09 RX ADMIN — SEVELAMER CARBONATE 800 MILLIGRAM(S): 2400 POWDER, FOR SUSPENSION ORAL at 08:48

## 2020-04-09 RX ADMIN — Medication 1 UNIT(S): at 08:50

## 2020-04-09 RX ADMIN — Medication 50 MILLIGRAM(S): at 04:52

## 2020-04-09 RX ADMIN — Medication 4: at 17:18

## 2020-04-09 RX ADMIN — Medication 2: at 12:26

## 2020-04-09 RX ADMIN — Medication 650 MILLIGRAM(S): at 22:53

## 2020-04-09 NOTE — PROGRESS NOTE ADULT - SUBJECTIVE AND OBJECTIVE BOX
Chief Complaint/Follow-up on: T1D    Subjective: She complains of fatigue, unable to sleep last night due to cough and sob. She is on NC currently.   She is not currently as much as usual - skipped b'fast but ate lunch. She denies abdominal pain or nausea.   She denies any sick contacts at home but per  her grandson had it but she has not seen him in 3 weeks. Her  suspects that she could have gotten it at HD as they only leave the house to go to doctor's appointments. He notes that she has been coughing and intermittent fever for 3 weeks associated with elevated bs. He was so concerned that he has brought her to the ED two other times last month but she was never admitted nor tested for COVID.      MEDICATIONS  (STANDING):  aspirin enteric coated 81 milliGRAM(s) Oral daily  atorvastatin 80 milliGRAM(s) Oral at bedtime  cefTRIAXone   IVPB 1000 milliGRAM(s) IV Intermittent every 24 hours  clopidogrel Tablet 75 milliGRAM(s) Oral daily  dextrose 5%. 1000 milliLiter(s) (50 mL/Hr) IV Continuous <Continuous>  dextrose 50% Injectable 12.5 Gram(s) IV Push once  dextrose 50% Injectable 25 Gram(s) IV Push once  dextrose 50% Injectable 25 Gram(s) IV Push once  dextrose 50% Injectable 12.5 Gram(s) IV Push once  heparin  Injectable 5000 Unit(s) SubCutaneous every 12 hours  insulin glargine Injectable (LANTUS) 16 Unit(s) SubCutaneous at bedtime  insulin lispro (HumaLOG) corrective regimen sliding scale   SubCutaneous three times a day before meals  insulin lispro Injectable (HumaLOG) 1 Unit(s) SubCutaneous before breakfast  insulin lispro Injectable (HumaLOG) 1 Unit(s) SubCutaneous before lunch  insulin lispro Injectable (HumaLOG) 1 Unit(s) SubCutaneous before dinner  melatonin 3 milliGRAM(s) Oral once  methylPREDNISolone sodium succinate Injectable 20 milliGRAM(s) IV Push two times a day  metoprolol succinate ER 50 milliGRAM(s) Oral daily  midodrine. 10 milliGRAM(s) Oral three times a day  pantoprazole    Tablet 40 milliGRAM(s) Oral before breakfast  sevelamer carbonate 800 milliGRAM(s) Oral three times a day with meals  zinc sulfate 220 milliGRAM(s) Oral daily    MEDICATIONS  (PRN):  acetaminophen   Tablet .. 650 milliGRAM(s) Oral every 4 hours PRN Temp greater or equal to 38C (100.4F), Mild Pain (1 - 3), Moderate Pain (4 - 6), Severe Pain (7 - 10)  ALBUTerol    90 MICROgram(s) HFA Inhaler 2 Puff(s) Inhalation every 4 hours PRN Shortness of Breath and/or Wheezing  benzonatate 100 milliGRAM(s) Oral three times a day PRN Cough  dextrose 40% Gel 15 Gram(s) Oral once PRN Blood Glucose LESS THAN 70 milliGRAM(s)/deciliter  glucagon  Injectable 1 milliGRAM(s) IntraMuscular once PRN Glucose LESS THAN 70 milligrams/deciliter  glucagon  Injectable 1 milliGRAM(s) IntraMuscular once PRN Glucose LESS THAN 70 milligrams/deciliter  guaiFENesin   Syrup  (Sugar-Free) 200 milliGRAM(s) Oral every 6 hours PRN Cough  ondansetron Injectable 4 milliGRAM(s) IV Push every 12 hours PRN Nausea and/or Vomiting      PHYSICAL EXAM:  VITALS: T(C): 36.4 (04-09-20 @ 12:33)  T(F): 97.5 (04-09-20 @ 12:33), Max: 99.1 (04-09-20 @ 02:38)  HR: 72 (04-09-20 @ 12:33) (8 - 80)  BP: 131/62 (04-09-20 @ 12:33) (103/55 - 131/62)  RR:  (18 - 22)  SpO2:  (96% - 100%)  Due to [facility] policy during the evolving novel coronavirus outbreak, efforts are being made to limit unnecessary patient contacts to limit the spread of disease. Accordingly, patients without clear indication for physical exam or face to face interview from the Endocrine team will be adjusted in conjunction with conversations with primary provider team. This is being done for the safety of all the patients we care for.       POCT Blood Glucose.: 287 mg/dL (04-09-20 @ 08:28)  POCT Blood Glucose.: 232 mg/dL (04-09-20 @ 04:43)  POCT Blood Glucose.: 128 mg/dL (04-09-20 @ 00:30)  POCT Blood Glucose.: 109 mg/dL (04-08-20 @ 23:37)  POCT Blood Glucose.: 212 mg/dL (04-08-20 @ 20:04)  POCT Blood Glucose.: 243 mg/dL (04-08-20 @ 17:56)  POCT Blood Glucose.: 338 mg/dL (04-08-20 @ 16:01)  POCT Blood Glucose.: 531 mg/dL (04-08-20 @ 10:06)  POCT Blood Glucose.: 557 mg/dL (04-08-20 @ 05:37)  POCT Blood Glucose.: 487 mg/dL (04-08-20 @ 00:45)  POCT Blood Glucose.: 488 mg/dL (04-07-20 @ 21:52)  POCT Blood Glucose.: 395 mg/dL (04-07-20 @ 17:00)    04-09    140  |  94<L>  |  32<H>  ----------------------------<  239<H>  3.9   |  26  |  3.23<H>    EGFR if : 17<L>  EGFR if non : 15<L>    Ca    9.2      04-09  Mg     2.1     04-09  Phos  4.6     04-09    TPro  6.0  /  Alb  3.1<L>  /  TBili  0.3  /  DBili  x   /  AST  48<H>  /  ALT  36  /  AlkPhos  171<H>  04-09          Hemoglobin A1C, Whole Blood: 7.7 % <H> [4.0 - 5.6] (04-08-20 @ 08:03)  Hemoglobin A1C, Whole Blood: 8.2 % <H> [4.0 - 5.6] (01-18-20 @ 09:41)

## 2020-04-09 NOTE — PROGRESS NOTE ADULT - SUBJECTIVE AND OBJECTIVE BOX
[facility]   [phone]  RENAL FOLLOW UP NOTE  --------------------------------------------------------------------------------  Chief Complaint:    24 hour events/subjective:  contacted earlier.   on covid 19 isolation   states breathing is a but better. cough is present     PAST HISTORY  --------------------------------------------------------------------------------  No significant changes to PMH, PSH, FHx, SHx, unless otherwise noted    ALLERGIES & MEDICATIONS  --------------------------------------------------------------------------------  Allergies    No Known Allergies    Intolerances      Standing Inpatient Medications  aspirin enteric coated 81 milliGRAM(s) Oral daily  atorvastatin 80 milliGRAM(s) Oral at bedtime  cefTRIAXone   IVPB 1000 milliGRAM(s) IV Intermittent every 24 hours  clopidogrel Tablet 75 milliGRAM(s) Oral daily  dextrose 5%. 1000 milliLiter(s) IV Continuous <Continuous>  dextrose 50% Injectable 12.5 Gram(s) IV Push once  dextrose 50% Injectable 25 Gram(s) IV Push once  dextrose 50% Injectable 25 Gram(s) IV Push once  dextrose 50% Injectable 12.5 Gram(s) IV Push once  heparin  Injectable 5000 Unit(s) SubCutaneous every 12 hours  insulin glargine Injectable (LANTUS) 16 Unit(s) SubCutaneous at bedtime  insulin lispro (HumaLOG) corrective regimen sliding scale   SubCutaneous at bedtime  insulin lispro (HumaLOG) corrective regimen sliding scale   SubCutaneous <User Schedule>  insulin lispro (HumaLOG) corrective regimen sliding scale   SubCutaneous three times a day before meals  insulin lispro Injectable (HumaLOG) 4 Unit(s) SubCutaneous before breakfast  insulin lispro Injectable (HumaLOG) 4 Unit(s) SubCutaneous before lunch  insulin lispro Injectable (HumaLOG) 5 Unit(s) SubCutaneous before dinner  melatonin 3 milliGRAM(s) Oral once  methylPREDNISolone sodium succinate Injectable 20 milliGRAM(s) IV Push two times a day  metoprolol succinate ER 50 milliGRAM(s) Oral daily  midodrine. 10 milliGRAM(s) Oral three times a day  pantoprazole    Tablet 40 milliGRAM(s) Oral before breakfast  sevelamer carbonate 800 milliGRAM(s) Oral three times a day with meals  zinc sulfate 220 milliGRAM(s) Oral daily    PRN Inpatient Medications  acetaminophen   Tablet .. 650 milliGRAM(s) Oral every 4 hours PRN  ALBUTerol    90 MICROgram(s) HFA Inhaler 2 Puff(s) Inhalation every 4 hours PRN  benzonatate 100 milliGRAM(s) Oral three times a day PRN  dextrose 40% Gel 15 Gram(s) Oral once PRN  glucagon  Injectable 1 milliGRAM(s) IntraMuscular once PRN  glucagon  Injectable 1 milliGRAM(s) IntraMuscular once PRN  guaiFENesin   Syrup  (Sugar-Free) 200 milliGRAM(s) Oral every 6 hours PRN  ondansetron Injectable 4 milliGRAM(s) IV Push every 12 hours PRN      REVIEW OF SYSTEMS  --------------------------------------------------------------------------------    Gen: denies  fevers/chills,  CVS: denies chest pain/palpitations  Resp: + SOB/Cough+   GI: Denies N/V/Abd pain  :  no uo     All other systems were reviewed and are negative, except as noted.    VITALS/PHYSICAL EXAM  --------------------------------------------------------------------------------  T(C): 36.9 (04-09-20 @ 16:56), Max: 37.3 (04-09-20 @ 02:38)  HR: 73 (04-09-20 @ 16:56) (8 - 79)  BP: 150/70 (04-09-20 @ 16:56) (103/55 - 150/70)  RR: 18 (04-09-20 @ 16:56) (18 - 20)  SpO2: 93% (04-09-20 @ 16:56) (93% - 100%)  Wt(kg): --        04-08-20 @ 07:01  -  04-09-20 @ 07:00  --------------------------------------------------------  IN: 240 mL / OUT: 2000 mL / NET: -1760 mL    04-09-20 @ 07:01  -  04-09-20 @ 20:49  --------------------------------------------------------  IN: 720 mL / OUT: 0 mL / NET: 720 mL      Physical Exam:  	  	  due to covid 19 isolation exam reliability per primary team   	  LABS/STUDIES  --------------------------------------------------------------------------------              8.8    12.09 >-----------<  284      [04-09-20 @ 06:56]              28.5     140  |  94  |  32  ----------------------------<  239      [04-09-20 @ 06:52]  3.9   |  26  |  3.23        Ca     9.2     [04-09-20 @ 06:52]      Mg     2.1     [04-09-20 @ 06:52]      Phos  4.6     [04-09-20 @ 06:52]    TPro  6.0  /  Alb  3.1  /  TBili  0.3  /  DBili  x   /  AST  48  /  ALT  36  /  AlkPhos  171  [04-09-20 @ 06:52]      PTT: 28.0       [04-08-20 @ 08:13]          [04-08-20 @ 07:02]        [04-09-20 @ 06:52]    Creatinine Trend:  SCr 3.23 [04-09 @ 06:52]  SCr 4.35 [04-08 @ 07:02]  SCr 3.35 [04-07 @ 18:06]  SCr 6.49 [04-06 @ 19:29]  SCr 6.32 [04-06 @ 17:15]                  Iron 67, TIBC 234, %sat 29      [12-04-19 @ 08:38]  Ferritin 6405      [04-09-20 @ 08:19]  PTH -- (Ca 8.3)      [12-04-19 @ 08:38]   952  PTH -- (Ca 9.6)      [06-17-19 @ 18:06]   177  HbA1c 7.7      [04-08-20 @ 08:03]  TSH 1.78      [11-14-19 @ 09:15]  Lipid: chol 108, TG 76, HDL 57, LDL 36      [11-14-19 @ 09:16]

## 2020-04-09 NOTE — PROGRESS NOTE ADULT - PROBLEM SELECTOR PLAN 1
-Adjust insulin as thus:  Humalog 5 units tid-ac as patient is getting steroids.  Cotninue Lantus 16 units sq qhs and small correctioanl scale tid-ac/qhs  Check bs at 2am (she has hypoglycemia unawareness) and cover with low QHS scale  DISPO: basal/bolus, f/u with Dr. Ley, endocrine in [place] -Adjust insulin as thus:  Humalog 4/4/5 units sq with B/L/D patient is getting steroids.  Continue Lantus 16 units sq qhs and small correctional scale tid-ac/qhs  Check bs at 2am (she has hypoglycemia unawareness) and cover with low QHS scale  DISPO: basal/bolus, f/u with Dr. Ley, endocrine in [place]

## 2020-04-09 NOTE — CONSULT NOTE ADULT - REASON FOR ADMISSION
fever, cough, encephalopathy.

## 2020-04-09 NOTE — PROGRESS NOTE ADULT - SUBJECTIVE AND OBJECTIVE BOX
Cardiovascular Disease Progress Note    Overnight events: No acute events overnight.  remains hd stable on minimal supplemental o2. no further episodes of cp/sob reported. hep gtt ddiscontinued  Otherwise review of systems negative    Objective Findings:  T(C): 36.8 (04-09-20 @ 04:46), Max: 37.3 (04-09-20 @ 02:38)  HR: 79 (04-09-20 @ 04:46) (8 - 80)  BP: 111/53 (04-09-20 @ 04:46) (103/55 - 130/68)  RR: 20 (04-09-20 @ 04:46) (18 - 22)  SpO2: 100% (04-09-20 @ 04:46) (99% - 100%)  Wt(kg): --  Daily     Daily           Telemetry:    Laboratory Data:                        8.8    12.09 )-----------( 284      ( 09 Apr 2020 06:56 )             28.5     04-09    140  |  94<L>  |  32<H>  ----------------------------<  239<H>  3.9   |  26  |  3.23<H>    Ca    9.2      09 Apr 2020 06:52  Phos  4.6     04-09  Mg     2.1     04-09    TPro  6.0  /  Alb  3.1<L>  /  TBili  0.3  /  DBili  x   /  AST  48<H>  /  ALT  36  /  AlkPhos  171<H>  04-09    PT/INR - ( 07 Apr 2020 18:06 )   PT: 14.8 sec;   INR: 1.29 ratio         PTT - ( 08 Apr 2020 08:13 )  PTT:28.0 sec  CARDIAC MARKERS ( 08 Apr 2020 07:02 )  x     / x     / 354 U/L / x     / x      CARDIAC MARKERS ( 07 Apr 2020 18:06 )  x     / x     / 135 U/L / x     / 3.8 ng/mL          Inpatient Medications:  MEDICATIONS  (STANDING):  aspirin enteric coated 81 milliGRAM(s) Oral daily  atorvastatin 80 milliGRAM(s) Oral at bedtime  cefTRIAXone   IVPB 1000 milliGRAM(s) IV Intermittent every 24 hours  clopidogrel Tablet 75 milliGRAM(s) Oral daily  dextrose 5%. 1000 milliLiter(s) (50 mL/Hr) IV Continuous <Continuous>  dextrose 50% Injectable 12.5 Gram(s) IV Push once  dextrose 50% Injectable 25 Gram(s) IV Push once  dextrose 50% Injectable 25 Gram(s) IV Push once  dextrose 50% Injectable 12.5 Gram(s) IV Push once  heparin  Injectable 5000 Unit(s) SubCutaneous every 12 hours  insulin glargine Injectable (LANTUS) 16 Unit(s) SubCutaneous at bedtime  insulin lispro (HumaLOG) corrective regimen sliding scale   SubCutaneous three times a day before meals  insulin lispro Injectable (HumaLOG) 1 Unit(s) SubCutaneous before breakfast  insulin lispro Injectable (HumaLOG) 1 Unit(s) SubCutaneous before lunch  insulin lispro Injectable (HumaLOG) 1 Unit(s) SubCutaneous before dinner  melatonin 3 milliGRAM(s) Oral once  methylPREDNISolone sodium succinate Injectable 20 milliGRAM(s) IV Push two times a day  metoprolol succinate ER 50 milliGRAM(s) Oral daily  midodrine. 10 milliGRAM(s) Oral three times a day  pantoprazole    Tablet 40 milliGRAM(s) Oral before breakfast  sevelamer carbonate 800 milliGRAM(s) Oral three times a day with meals  zinc sulfate 220 milliGRAM(s) Oral daily      Assessment:  -elevated troponin  -possible acute inf stemi  -novel coronavirus/sars-cov2 infection  -NSVT  -ischemic cardiomyopathy c/b mod to severe LV dysfunction, cad s/p multiple stents  -esrd on hd  -PAD s/p prior intervention       Recs:  -would expect greater degree of troponin elevation if truly acute inf stemi and patient without any ischemic sx at present. a/w holding off on [facility] for now given overall poor prognosis, unlikely to  at this time. as hep gtt can potentially lead to adverse outcomes in setting of novel coronavirus, after discussion with dr blevins will d/c hep gtt for now. cont to treat medically with anti-platelet agents and statin  -c/w supportive care for covid-19. not a candidate for hydroxychloroquine due to prolonged qtc and ischemic heart disease and hx of ventricular arrhtyhmias  -resume gdmt for lv dysfunction as bp allows. currently on midodrine  -known extensive CAD and ICM. s/p [facility] 2/20/2019 --> severe and diffuse instent restenosis along RCA --> unable to stent due to multiple layers of stenting and prior brachytherapy. denies any true ischemic sx at present  -s/p TTE 2019: mod-severe MR, pseudo AS (low flow-low gradient), mod-severe LV dysfunction --> plan is for medical management given surgical risk and patient preference  -c/w asa, plavix, statin for ischemic cardiomyopathy/CAD/PAD        Over 25 minutes spent on total encounter; more than 50% of the visit was spent counseling and/or coordinating care by the attending physician.      Julián Biswas MD   Cardiovascular Disease  [phone]

## 2020-04-09 NOTE — PROGRESS NOTE ADULT - ASSESSMENT
64 F PMH CAD Sp PTCA w stents CHF,COPD, CVA, DMT1, ESRD on HD (M,Tu,Th,Sa), Gout, HLD, PVD, Sublcavian Stensosis (L) sp stent. PSH ASD Repair,idania,fem-pop bypass, recurrent admission for hyper/hypoglycemia/DKA, p/w fever, cough and encephalopathy, found to be in septic shock with hypoxic respiratory failure, acute STEMI suspected 2/2 possible in stent re-stenosis exacerbated by viral illness/tachycardia, and with hyperglycemia/elevated anion gap with compensated acidosis.     Pneumonia due to 2019 novel coronavirus.   - fever, cough, sob, encephalopathy; presents with septic shock and hypoxia, found with lymphopenia, elevated lactate/procal/ldh/ddimer, and cxr showing b/l opacities, COVID19 PCR  positive.   -airborne + contact isolation  -Supplemental oxygen as required. Can escalate to NRB if required, would avoid bilevel support at this time.  -ID eval   -c/w plaquenil qtc noted 498 would avoid azithro  -patient with concurrent copd; s/p solumedrol 40 iv x 1 in ER. unclear if true copd exacerbation, no significant wheeze but moving air poorly b/l.  Will continue 1mg/kg solu-medrol divided bid; solu-medrol 20 IV bid x 3 days and reassess  -MDI albuterol prn  -given elevated lactate and procal and multiple comorbidities + septic shock, seems reasonable to continue with empiric coverage of bacterial superinfection.  c/w ceftriaxone for now, hold azithro for elevated qtc  -check legionella  -zofran, robitussin, tessalon, tylenol prn.     Septic shock.  - fever, tachycardia, tachypnea, hypotension, elevated lactate. source is covid19 pna  -c/w mgt as noted above.   -c/w midodrine 10 tid  -can give small volume bolus  -patient confirmed DNR/DNI; no clear benefit for pressor therapy in setting of active STEMI that is not being intervened on, septic shock, covid19 pna in patient who is former smoker and with copd, with other significant mobidities such as ESRD on dialysis.   -encephalopathic from acute illness, with map ~65.     Acute respiratory failure with hypoxia.  - 2/2 covid 19 pna  - Supplemental oxygen as required. Can escalate to NRB if required, would avoid bilevel support at this time.     ST elevation myocardial infarction (STEMI), unspecified artery.   - patient presenting with VENUS inferior leads and reciprocal changes and initial . denies cp or palps.   -cardiology follow.    -pt started on heparin gtt full dose in ER prior to medicine team involvement.  Will change dosing to ACS protocol, as the elevated ddimer is unlikely to represent true PE and rather is fairly typical of acute covid19 presentations.    -c/w aspirin, plavix, increase statin to atorva 80 (monitor LFT as pt has mild transaminitis likely viral/covid mediated), c/w bb  -f/u further cards recs    ESRD on hemodialysis.   -c/w sevelemer  -c/w midodrine  -renal follow.     Type 2 diabetes mellitus with hyperglycemia, with long-term current use of insulin.  -pt with fsg in 400s, and now on short course steroids for covid19/copd  -pt taking Lantus 8/humalog 4 premeal at home  -will increase basal bolus as follows, similar to prior hospitalization: Lantus 13, + HISS  -endo eval  -currently pt has compensated multifactorial met.acidosis; bicarb >18 and pH nearly normal.  AG Is noted 29 but with elevated lactate is likely multifactorial. Check BHB. Will dose basal insulin now and monitor AG.    Advanced care planning/counseling discussion.  - pt is CONFIRMED TO BE DNR/ DNI.   - trial of IV fluids/abx acceptable.      Prophylactic measure.   - Heparin    d/w  QA    Pierce Viera MD pager [phone]

## 2020-04-09 NOTE — CHART NOTE - NSCHARTNOTEFT_GEN_A_CORE
RN reported patient had 6 beats of wide complex tachycardia; now back in NSR, asymptomatic.  Spoke to cardiologist Dr. Biswas, recommended continuing metoprolol succinate.    Chikis Mace PA-C  [id number]

## 2020-04-09 NOTE — PROGRESS NOTE ADULT - ASSESSMENT
64 F PMH CAD Sp PTCA w stents CHF,COPD, CVA, DMT1, ESRD on HD (M,Tu,Th,Sa), Gout, HLD, PVD, Sublcavian Stensosis (L) sp stent. PSH ASD Repair,idania,fem-pop bypass, recurrent admission for hyper/hypoglycemia/DKA, p/w fever, cough       1- ESRD  2- COVID 19 +   3- viral pneumonia   4- hypotension       hd am    cont midodrine 10 mg tid   plaquenil 200 mg bid   will attempt lovenox   insulin for hyperglycemia   O2 supplement

## 2020-04-09 NOTE — CONSULT NOTE ADULT - SUBJECTIVE AND OBJECTIVE BOX
HPI:   Patient is a 64y female with chf, ischemic cardiomyopathy, pvd, dm , esrd on dialysis admitted a couple of days ago with high fever, lethargy, hypotensive. She reports having cough , fever, sob, poor po intake , a little diarrhea starting about a week ago. She was thought to have septic shock due to high procal and presentation so given ctx.She tested positive for covid.  She was given plaquenil but stopped due to long qtc. She may have had an nstemi due to demand ischemia so that is being addressed. She is now feeling much better. She is no longer febrile and she is having good saturation on low flow nasal cannula oxygen. She now is eating well and walking around the room . She is on iv steroids too.     REVIEW OF SYSTEMS:  All other review of systems negative (Comprehensive ROS)    PAST MEDICAL & SURGICAL HISTORY:  COPD (chronic obstructive pulmonary disease)  Localized enlarged lymph nodes  CHF (congestive heart failure): EF 40-45%  Subclavian vein stenosis, left: s/p stent  DKA, type 1: 1/2015  ACS (acute coronary syndrome): 1/2015 - cath revealed 100% ostial stenosis not amenable to PCI - medical management  TIA (transient ischemic attack): x 2 - 8-9 years ago prior to ASD/VSD repair  CAD (coronary artery disease): s/p stents  Gout: past  CVA (cerebral infarction): with no residual, 8 yrs ago, prior to heart surgery - ST memory loss  Peripheral vascular disease: occluded left fem-pop bypass 5/2015  Diabetes mellitus type 1: Insulin Dependent -  ESRD (end stage renal disease): dialysis  M, tue, thursday, saturday  Hyperlipidemia  Status post device closure of ASD: &quot;[place]&quot;  History of cardiac catheterization: 1/2015 - no intervention  S/P femoral-popliteal bypass surgery: L and R in 2013 with graft; 5/2015 CFA angioplasty left and ileofemoral endarterectomywith vein patch angioplasty of left fem-pop bypass graft  Multiple vascular surgery both leg, left fempop bypass revision 11/2015  AV (arteriovenous fistula): Left AV graft; revision with stent placement 2-3 years ago  S/P cholecystectomy      Allergies    No Known Allergies    Intolerances        Antimicrobials Day #  :3  cefTRIAXone   IVPB 1000 milliGRAM(s) IV Intermittent every 24 hours    Other Medications:  acetaminophen   Tablet .. 650 milliGRAM(s) Oral every 4 hours PRN  ALBUTerol    90 MICROgram(s) HFA Inhaler 2 Puff(s) Inhalation every 4 hours PRN  aspirin enteric coated 81 milliGRAM(s) Oral daily  atorvastatin 80 milliGRAM(s) Oral at bedtime  benzonatate 100 milliGRAM(s) Oral three times a day PRN  clopidogrel Tablet 75 milliGRAM(s) Oral daily  dextrose 40% Gel 15 Gram(s) Oral once PRN  dextrose 5%. 1000 milliLiter(s) IV Continuous <Continuous>  dextrose 50% Injectable 12.5 Gram(s) IV Push once  dextrose 50% Injectable 25 Gram(s) IV Push once  dextrose 50% Injectable 25 Gram(s) IV Push once  dextrose 50% Injectable 12.5 Gram(s) IV Push once  glucagon  Injectable 1 milliGRAM(s) IntraMuscular once PRN  glucagon  Injectable 1 milliGRAM(s) IntraMuscular once PRN  guaiFENesin   Syrup  (Sugar-Free) 200 milliGRAM(s) Oral every 6 hours PRN  heparin  Injectable 5000 Unit(s) SubCutaneous every 12 hours  insulin glargine Injectable (LANTUS) 16 Unit(s) SubCutaneous at bedtime  insulin lispro (HumaLOG) corrective regimen sliding scale   SubCutaneous at bedtime  insulin lispro (HumaLOG) corrective regimen sliding scale   SubCutaneous <User Schedule>  insulin lispro (HumaLOG) corrective regimen sliding scale   SubCutaneous three times a day before meals  insulin lispro Injectable (HumaLOG) 4 Unit(s) SubCutaneous before breakfast  insulin lispro Injectable (HumaLOG) 4 Unit(s) SubCutaneous before lunch  insulin lispro Injectable (HumaLOG) 5 Unit(s) SubCutaneous before dinner  melatonin 3 milliGRAM(s) Oral once  methylPREDNISolone sodium succinate Injectable 20 milliGRAM(s) IV Push two times a day  metoprolol succinate ER 50 milliGRAM(s) Oral daily  midodrine. 10 milliGRAM(s) Oral three times a day  ondansetron Injectable 4 milliGRAM(s) IV Push every 12 hours PRN  pantoprazole    Tablet 40 milliGRAM(s) Oral before breakfast  sevelamer carbonate 800 milliGRAM(s) Oral three times a day with meals  zinc sulfate 220 milliGRAM(s) Oral daily      FAMILY HISTORY:  Family history of smoking  Family history of hypertension  Family history of cancer (Sibling)      SOCIAL HISTORY:  Smoking: [ ]Yes [ x]No  ETOH: [ ]Yes [ x]No  Drug Use: [ ]Yes x[ ]No   [ x] Single[ ]    T(F): 98.4 (04-09-20 @ 16:56), Max: 99.1 (04-09-20 @ 02:38)  HR: 73 (04-09-20 @ 16:56)  BP: 150/70 (04-09-20 @ 16:56)  RR: 18 (04-09-20 @ 16:56)  SpO2: 93% (04-09-20 @ 16:56)  Wt(kg): --    PHYSICAL EXAM:  General: alert, no acute distress  Eyes:  anicteric, no conjunctival injection, no discharge  Oropharynx: no lesions or injection 	  Neck: supple, without adenopathy  Lungs: basilar rales  to auscultation  Heart: regular rate and rhythm; no murmur, rubs or gallops  Abdomen: soft, nondistended, nontender, without mass or organomegaly  Skin: no lesions  Extremities: no clubbing, cyanosis, or edema  Neurologic: alert, oriented, moves all extremities    LAB RESULTS:                        8.8    12.09 )-----------( 284      ( 09 Apr 2020 06:56 )             28.5     04-09    140  |  94<L>  |  32<H>  ----------------------------<  239<H>  3.9   |  26  |  3.23<H>    Ca    9.2      09 Apr 2020 06:52  Phos  4.6     04-09  Mg     2.1     04-09    TPro  6.0  /  Alb  3.1<L>  /  TBili  0.3  /  DBili  x   /  AST  48<H>  /  ALT  36  /  AlkPhos  171<H>  04-09    LIVER FUNCTIONS - ( 09 Apr 2020 06:52 )  Alb: 3.1 g/dL / Pro: 6.0 g/dL / ALK PHOS: 171 U/L / ALT: 36 U/L / AST: 48 U/L / GGT: x               MICROBIOLOGY:  RECENT CULTURES:  COVID-19 PCR . (04.07.20 @ 17:33)    COVID-19 PCR: Detected: This test has been validated by [facility] to be accurate;  though it has not been FDA cleared/approved by the usual pathway.  As with all laboratory tests, results should be correlated with clinical  findings.  https://www.fda.gov/media/498459/download  https://www.fda.gov/media/824005/download        ferritin 6400 d dimer 780  RADIOLOGY REVIEWED:  < from: [facility] Duplex Lower Ext Vein Scan, Left (04.08.20 @ 19:35) >    EXAM:  DUPLEX EXT VEINS LOWER LT                            PROCEDURE DATE:  04/08/2020            INTERPRETATION:  CLINICAL INFORMATION: 64-year-old female with left lower extremity pain    COMPARISON: None available.    TECHNIQUE: Duplex sonography of the LEFT LOWER extremity veins with color and spectral Doppler, with and without compression.      FINDINGS:    There is normal compressibility of the left common femoral, femoral and popliteal veins.     The contralateral common femoral vein ispatent.    Doppler examination shows pulsatile flow which may be secondary to CHF.    No calf vein thrombosis is detected.    IMPRESSION:     No evidence of left lower extremity deep venous thrombosis.    < end of copied text >          Impression:  Patient with dm, ischemic cardiomyopathy, esrd on hd, pvd admitted with lethargy, fever, sob, cough , tested positive for covid. She was hypotensive on admit so given some antibiotics. She cannot take plaquenil. She is on some steroids to quel inflammation. HEr inflammatory markers are a bit high but she is oxygenating quite well. No blood cultures sent and she is doing well on ctx so will finish 2 more days.     Recommendations:  continue 2 more days of ctx  finishes steroids tomorrow  will monitor for cytokine storm, if oxygenation gets bad will consider anakinra

## 2020-04-09 NOTE — PROGRESS NOTE ADULT - SUBJECTIVE AND OBJECTIVE BOX
Patient is a 64y old  Female who presents with a chief complaint of fever, cough, encephalopathy. (09 Apr 2020 17:47)      SUBJECTIVE / OVERNIGHT EVENTS: feels better.  Review of Systems  chest pain no  palpitations no  sob no  nausea no  headache no    MEDICATIONS  (STANDING):  aspirin enteric coated 81 milliGRAM(s) Oral daily  atorvastatin 80 milliGRAM(s) Oral at bedtime  cefTRIAXone   IVPB 1000 milliGRAM(s) IV Intermittent every 24 hours  clopidogrel Tablet 75 milliGRAM(s) Oral daily  dextrose 5%. 1000 milliLiter(s) (50 mL/Hr) IV Continuous <Continuous>  dextrose 50% Injectable 12.5 Gram(s) IV Push once  dextrose 50% Injectable 25 Gram(s) IV Push once  dextrose 50% Injectable 25 Gram(s) IV Push once  dextrose 50% Injectable 12.5 Gram(s) IV Push once  heparin  Injectable 5000 Unit(s) SubCutaneous every 12 hours  insulin glargine Injectable (LANTUS) 16 Unit(s) SubCutaneous at bedtime  insulin lispro (HumaLOG) corrective regimen sliding scale   SubCutaneous at bedtime  insulin lispro (HumaLOG) corrective regimen sliding scale   SubCutaneous <User Schedule>  insulin lispro (HumaLOG) corrective regimen sliding scale   SubCutaneous three times a day before meals  insulin lispro Injectable (HumaLOG) 4 Unit(s) SubCutaneous before breakfast  insulin lispro Injectable (HumaLOG) 4 Unit(s) SubCutaneous before lunch  insulin lispro Injectable (HumaLOG) 5 Unit(s) SubCutaneous before dinner  melatonin 3 milliGRAM(s) Oral once  methylPREDNISolone sodium succinate Injectable 20 milliGRAM(s) IV Push two times a day  metoprolol succinate ER 50 milliGRAM(s) Oral daily  midodrine. 10 milliGRAM(s) Oral three times a day  pantoprazole    Tablet 40 milliGRAM(s) Oral before breakfast  sevelamer carbonate 800 milliGRAM(s) Oral three times a day with meals  zinc sulfate 220 milliGRAM(s) Oral daily    MEDICATIONS  (PRN):  acetaminophen   Tablet .. 650 milliGRAM(s) Oral every 4 hours PRN Temp greater or equal to 38C (100.4F), Mild Pain (1 - 3), Moderate Pain (4 - 6), Severe Pain (7 - 10)  ALBUTerol    90 MICROgram(s) HFA Inhaler 2 Puff(s) Inhalation every 4 hours PRN Shortness of Breath and/or Wheezing  benzonatate 100 milliGRAM(s) Oral three times a day PRN Cough  dextrose 40% Gel 15 Gram(s) Oral once PRN Blood Glucose LESS THAN 70 milliGRAM(s)/deciliter  glucagon  Injectable 1 milliGRAM(s) IntraMuscular once PRN Glucose LESS THAN 70 milligrams/deciliter  glucagon  Injectable 1 milliGRAM(s) IntraMuscular once PRN Glucose LESS THAN 70 milligrams/deciliter  guaiFENesin   Syrup  (Sugar-Free) 200 milliGRAM(s) Oral every 6 hours PRN Cough  ondansetron Injectable 4 milliGRAM(s) IV Push every 12 hours PRN Nausea and/or Vomiting      Vital Signs Last 24 Hrs  T(C): 36.9 (09 Apr 2020 16:56), Max: 37.3 (09 Apr 2020 02:38)  T(F): 98.4 (09 Apr 2020 16:56), Max: 99.1 (09 Apr 2020 02:38)  HR: 73 (09 Apr 2020 16:56) (8 - 79)  BP: 150/70 (09 Apr 2020 16:56) (103/55 - 150/70)  BP(mean): --  RR: 18 (09 Apr 2020 16:56) (18 - 22)  SpO2: 93% (09 Apr 2020 16:56) (93% - 100%)    PHYSICAL EXAM:  GENERAL: weak  HEAD:  Atraumatic, Normocephalic  EYES: EOMI, PERRLA, conjunctiva and sclera clear  NECK: Supple, No JVD  CHEST/LUNG: few crackles to auscultation bilaterally; No wheeze  HEART: Regular rate and rhythm; No murmurs, rubs, or gallops  ABDOMEN: Soft, Nontender, Nondistended; Bowel sounds present  EXTREMITIES:  2+ Peripheral Pulses, No clubbing, cyanosis, or edema  PSYCH: AAOx3  NEUROLOGY: non-focal  SKIN: No rashes or lesions    LABS:                        8.8    12.09 )-----------( 284      ( 09 Apr 2020 06:56 )             28.5     04-09    140  |  94<L>  |  32<H>  ----------------------------<  239<H>  3.9   |  26  |  3.23<H>    Ca    9.2      09 Apr 2020 06:52  Phos  4.6     04-09  Mg     2.1     04-09    TPro  6.0  /  Alb  3.1<L>  /  TBili  0.3  /  DBili  x   /  AST  48<H>  /  ALT  36  /  AlkPhos  171<H>  04-09    PTT - ( 08 Apr 2020 08:13 )  PTT:28.0 sec  CARDIAC MARKERS ( 08 Apr 2020 07:02 )  x     / x     / 354 U/L / x     / x                RADIOLOGY & ADDITIONAL TESTS:    Imaging Personally Reviewed:    Consultant(s) Notes Reviewed:      Care Discussed with Consultants/Other Providers:

## 2020-04-10 ENCOUNTER — TRANSCRIPTION ENCOUNTER (OUTPATIENT)
Age: 65
End: 2020-04-10

## 2020-04-10 LAB
ALBUMIN SERPL ELPH-MCNC: 3.2 G/DL — LOW (ref 3.3–5)
ALP SERPL-CCNC: 159 U/L — HIGH (ref 40–120)
ALT FLD-CCNC: 41 U/L — SIGNIFICANT CHANGE UP (ref 10–45)
ANION GAP SERPL CALC-SCNC: 17 MMOL/L — SIGNIFICANT CHANGE UP (ref 5–17)
AST SERPL-CCNC: 45 U/L — HIGH (ref 10–40)
BASOPHILS # BLD AUTO: 0.01 K/UL — SIGNIFICANT CHANGE UP (ref 0–0.2)
BASOPHILS NFR BLD AUTO: 0.1 % — SIGNIFICANT CHANGE UP (ref 0–2)
BILIRUB SERPL-MCNC: 0.2 MG/DL — SIGNIFICANT CHANGE UP (ref 0.2–1.2)
BUN SERPL-MCNC: 47 MG/DL — HIGH (ref 7–23)
CALCIUM SERPL-MCNC: 9 MG/DL — SIGNIFICANT CHANGE UP (ref 8.4–10.5)
CHLORIDE SERPL-SCNC: 88 MMOL/L — LOW (ref 96–108)
CO2 SERPL-SCNC: 27 MMOL/L — SIGNIFICANT CHANGE UP (ref 22–31)
CREAT SERPL-MCNC: 4.49 MG/DL — HIGH (ref 0.5–1.3)
EOSINOPHIL # BLD AUTO: 0 K/UL — SIGNIFICANT CHANGE UP (ref 0–0.5)
EOSINOPHIL NFR BLD AUTO: 0 % — SIGNIFICANT CHANGE UP (ref 0–6)
GLUCOSE BLDC GLUCOMTR-MCNC: 120 MG/DL — HIGH (ref 70–99)
GLUCOSE BLDC GLUCOMTR-MCNC: 227 MG/DL — HIGH (ref 70–99)
GLUCOSE BLDC GLUCOMTR-MCNC: 230 MG/DL — HIGH (ref 70–99)
GLUCOSE BLDC GLUCOMTR-MCNC: 245 MG/DL — HIGH (ref 70–99)
GLUCOSE BLDC GLUCOMTR-MCNC: 277 MG/DL — HIGH (ref 70–99)
GLUCOSE BLDC GLUCOMTR-MCNC: 375 MG/DL — HIGH (ref 70–99)
GLUCOSE BLDC GLUCOMTR-MCNC: 462 MG/DL — CRITICAL HIGH (ref 70–99)
GLUCOSE BLDC GLUCOMTR-MCNC: 546 MG/DL — CRITICAL HIGH (ref 70–99)
GLUCOSE BLDC GLUCOMTR-MCNC: 565 MG/DL — CRITICAL HIGH (ref 70–99)
GLUCOSE SERPL-MCNC: 232 MG/DL — HIGH (ref 70–99)
HCT VFR BLD CALC: 27.9 % — LOW (ref 34.5–45)
HGB BLD-MCNC: 9 G/DL — LOW (ref 11.5–15.5)
IMM GRANULOCYTES NFR BLD AUTO: 0.3 % — SIGNIFICANT CHANGE UP (ref 0–1.5)
LYMPHOCYTES # BLD AUTO: 0.31 K/UL — LOW (ref 1–3.3)
LYMPHOCYTES # BLD AUTO: 3.6 % — LOW (ref 13–44)
MAGNESIUM SERPL-MCNC: 2.2 MG/DL — SIGNIFICANT CHANGE UP (ref 1.6–2.6)
MCHC RBC-ENTMCNC: 32.3 GM/DL — SIGNIFICANT CHANGE UP (ref 32–36)
MCHC RBC-ENTMCNC: 33.1 PG — SIGNIFICANT CHANGE UP (ref 27–34)
MCV RBC AUTO: 102.6 FL — HIGH (ref 80–100)
MONOCYTES # BLD AUTO: 0.26 K/UL — SIGNIFICANT CHANGE UP (ref 0–0.9)
MONOCYTES NFR BLD AUTO: 3 % — SIGNIFICANT CHANGE UP (ref 2–14)
NEUTROPHILS # BLD AUTO: 8.03 K/UL — HIGH (ref 1.8–7.4)
NEUTROPHILS NFR BLD AUTO: 93 % — HIGH (ref 43–77)
NRBC # BLD: 0 /100 WBCS — SIGNIFICANT CHANGE UP (ref 0–0)
PHOSPHATE SERPL-MCNC: 4.9 MG/DL — HIGH (ref 2.5–4.5)
PLATELET # BLD AUTO: 277 K/UL — SIGNIFICANT CHANGE UP (ref 150–400)
POTASSIUM SERPL-MCNC: 4.5 MMOL/L — SIGNIFICANT CHANGE UP (ref 3.5–5.3)
POTASSIUM SERPL-SCNC: 4.5 MMOL/L — SIGNIFICANT CHANGE UP (ref 3.5–5.3)
PROT SERPL-MCNC: 6.1 G/DL — SIGNIFICANT CHANGE UP (ref 6–8.3)
RBC # BLD: 2.72 M/UL — LOW (ref 3.8–5.2)
RBC # FLD: 14.3 % — SIGNIFICANT CHANGE UP (ref 10.3–14.5)
SODIUM SERPL-SCNC: 132 MMOL/L — LOW (ref 135–145)
WBC # BLD: 8.64 K/UL — SIGNIFICANT CHANGE UP (ref 3.8–10.5)
WBC # FLD AUTO: 8.64 K/UL — SIGNIFICANT CHANGE UP (ref 3.8–10.5)

## 2020-04-10 PROCEDURE — G0406: CPT

## 2020-04-10 RX ORDER — INSULIN LISPRO 100/ML
5 VIAL (ML) SUBCUTANEOUS
Refills: 0 | Status: DISCONTINUED | OUTPATIENT
Start: 2020-04-10 | End: 2020-04-11

## 2020-04-10 RX ORDER — INSULIN LISPRO 100/ML
6 VIAL (ML) SUBCUTANEOUS
Refills: 0 | Status: DISCONTINUED | OUTPATIENT
Start: 2020-04-10 | End: 2020-04-11

## 2020-04-10 RX ADMIN — SEVELAMER CARBONATE 800 MILLIGRAM(S): 2400 POWDER, FOR SUSPENSION ORAL at 09:06

## 2020-04-10 RX ADMIN — PANTOPRAZOLE SODIUM 40 MILLIGRAM(S): 20 TABLET, DELAYED RELEASE ORAL at 06:30

## 2020-04-10 RX ADMIN — MIDODRINE HYDROCHLORIDE 10 MILLIGRAM(S): 2.5 TABLET ORAL at 06:28

## 2020-04-10 RX ADMIN — Medication 1: at 17:31

## 2020-04-10 RX ADMIN — SEVELAMER CARBONATE 800 MILLIGRAM(S): 2400 POWDER, FOR SUSPENSION ORAL at 11:44

## 2020-04-10 RX ADMIN — INSULIN GLARGINE 16 UNIT(S): 100 INJECTION, SOLUTION SUBCUTANEOUS at 22:08

## 2020-04-10 RX ADMIN — Medication 4 UNIT(S): at 12:54

## 2020-04-10 RX ADMIN — Medication 2: at 12:53

## 2020-04-10 RX ADMIN — CEFTRIAXONE 100 MILLIGRAM(S): 500 INJECTION, POWDER, FOR SOLUTION INTRAMUSCULAR; INTRAVENOUS at 22:08

## 2020-04-10 RX ADMIN — CLOPIDOGREL BISULFATE 75 MILLIGRAM(S): 75 TABLET, FILM COATED ORAL at 11:44

## 2020-04-10 RX ADMIN — Medication 20 MILLIGRAM(S): at 06:43

## 2020-04-10 RX ADMIN — Medication 4 UNIT(S): at 09:05

## 2020-04-10 RX ADMIN — MIDODRINE HYDROCHLORIDE 10 MILLIGRAM(S): 2.5 TABLET ORAL at 17:33

## 2020-04-10 RX ADMIN — Medication 1: at 02:21

## 2020-04-10 RX ADMIN — Medication 50 MILLIGRAM(S): at 06:29

## 2020-04-10 RX ADMIN — MIDODRINE HYDROCHLORIDE 10 MILLIGRAM(S): 2.5 TABLET ORAL at 11:44

## 2020-04-10 RX ADMIN — ATORVASTATIN CALCIUM 80 MILLIGRAM(S): 80 TABLET, FILM COATED ORAL at 22:08

## 2020-04-10 RX ADMIN — Medication 20 MILLIGRAM(S): at 17:33

## 2020-04-10 RX ADMIN — HEPARIN SODIUM 5000 UNIT(S): 5000 INJECTION INTRAVENOUS; SUBCUTANEOUS at 17:32

## 2020-04-10 RX ADMIN — SEVELAMER CARBONATE 800 MILLIGRAM(S): 2400 POWDER, FOR SUSPENSION ORAL at 17:33

## 2020-04-10 RX ADMIN — Medication 1: at 09:04

## 2020-04-10 RX ADMIN — ZINC SULFATE TAB 220 MG (50 MG ZINC EQUIVALENT) 220 MILLIGRAM(S): 220 (50 ZN) TAB at 11:44

## 2020-04-10 RX ADMIN — Medication 6 UNIT(S): at 17:32

## 2020-04-10 RX ADMIN — Medication 81 MILLIGRAM(S): at 11:44

## 2020-04-10 RX ADMIN — HEPARIN SODIUM 5000 UNIT(S): 5000 INJECTION INTRAVENOUS; SUBCUTANEOUS at 06:29

## 2020-04-10 NOTE — PROGRESS NOTE ADULT - SUBJECTIVE AND OBJECTIVE BOX
Chief Complaint/Follow-up on: T1    Subjective: Patient is now on RA. She notes that her sob and cough is improving. BS are running in the 200s.     MEDICATIONS  (STANDING):  aspirin enteric coated 81 milliGRAM(s) Oral daily  atorvastatin 80 milliGRAM(s) Oral at bedtime  cefTRIAXone   IVPB 1000 milliGRAM(s) IV Intermittent every 24 hours  clopidogrel Tablet 75 milliGRAM(s) Oral daily  dextrose 5%. 1000 milliLiter(s) (50 mL/Hr) IV Continuous <Continuous>  dextrose 50% Injectable 12.5 Gram(s) IV Push once  dextrose 50% Injectable 25 Gram(s) IV Push once  dextrose 50% Injectable 25 Gram(s) IV Push once  dextrose 50% Injectable 12.5 Gram(s) IV Push once  heparin  Injectable 5000 Unit(s) SubCutaneous every 12 hours  insulin glargine Injectable (LANTUS) 16 Unit(s) SubCutaneous at bedtime  insulin lispro (HumaLOG) corrective regimen sliding scale   SubCutaneous at bedtime  insulin lispro (HumaLOG) corrective regimen sliding scale   SubCutaneous <User Schedule>  insulin lispro (HumaLOG) corrective regimen sliding scale   SubCutaneous three times a day before meals  insulin lispro Injectable (HumaLOG) 4 Unit(s) SubCutaneous before breakfast  insulin lispro Injectable (HumaLOG) 4 Unit(s) SubCutaneous before lunch  insulin lispro Injectable (HumaLOG) 5 Unit(s) SubCutaneous before dinner  melatonin 3 milliGRAM(s) Oral once  methylPREDNISolone sodium succinate Injectable 20 milliGRAM(s) IV Push two times a day  metoprolol succinate ER 50 milliGRAM(s) Oral daily  midodrine. 10 milliGRAM(s) Oral three times a day  pantoprazole    Tablet 40 milliGRAM(s) Oral before breakfast  sevelamer carbonate 800 milliGRAM(s) Oral three times a day with meals  zinc sulfate 220 milliGRAM(s) Oral daily    MEDICATIONS  (PRN):  acetaminophen   Tablet .. 650 milliGRAM(s) Oral every 4 hours PRN Temp greater or equal to 38C (100.4F), Mild Pain (1 - 3), Moderate Pain (4 - 6), Severe Pain (7 - 10)  ALBUTerol    90 MICROgram(s) HFA Inhaler 2 Puff(s) Inhalation every 4 hours PRN Shortness of Breath and/or Wheezing  benzonatate 100 milliGRAM(s) Oral three times a day PRN Cough  dextrose 40% Gel 15 Gram(s) Oral once PRN Blood Glucose LESS THAN 70 milliGRAM(s)/deciliter  glucagon  Injectable 1 milliGRAM(s) IntraMuscular once PRN Glucose LESS THAN 70 milligrams/deciliter  glucagon  Injectable 1 milliGRAM(s) IntraMuscular once PRN Glucose LESS THAN 70 milligrams/deciliter  guaiFENesin   Syrup  (Sugar-Free) 200 milliGRAM(s) Oral every 6 hours PRN Cough  ondansetron Injectable 4 milliGRAM(s) IV Push every 12 hours PRN Nausea and/or Vomiting      PHYSICAL EXAM:  VITALS: T(C): 36.8 (04-10-20 @ 13:58)  T(F): 98.2 (04-10-20 @ 13:58), Max: 98.4 (04-09-20 @ 16:56)  HR: 66 (04-10-20 @ 13:58) (60 - 78)  BP: 119/56 (04-10-20 @ 13:58) (119/56 - 150/70)  RR:  (18 - 22)  SpO2:  (93% - 99%)  POCT Blood Glucose at lunch: 254  POCT Blood Glucose.: 227 mg/dL (04-10-20 @ 08:03)  POCT Blood Glucose.: 277 mg/dL (04-10-20 @ 01:58)  POCT Blood Glucose.: 311 mg/dL (04-09-20 @ 21:02)  POCT Blood Glucose.: 374 mg/dL (04-09-20 @ 17:05)  POCT Blood Glucose.: 287 mg/dL (04-09-20 @ 08:28)  POCT Blood Glucose.: 232 mg/dL (04-09-20 @ 04:43)  POCT Blood Glucose.: 128 mg/dL (04-09-20 @ 00:30)  POCT Blood Glucose.: 109 mg/dL (04-08-20 @ 23:37)  POCT Blood Glucose.: 212 mg/dL (04-08-20 @ 20:04)  POCT Blood Glucose.: 243 mg/dL (04-08-20 @ 17:56)  POCT Blood Glucose.: 338 mg/dL (04-08-20 @ 16:01)  POCT Blood Glucose.: 531 mg/dL (04-08-20 @ 10:06)  POCT Blood Glucose.: 557 mg/dL (04-08-20 @ 05:37)  POCT Blood Glucose.: 487 mg/dL (04-08-20 @ 00:45)  POCT Blood Glucose.: 488 mg/dL (04-07-20 @ 21:52)  POCT Blood Glucose.: 395 mg/dL (04-07-20 @ 17:00)    04-10    132<L>  |  88<L>  |  47<H>  ----------------------------<  232<H>  4.5   |  27  |  4.49<H>    EGFR if : 11<L>  EGFR if non : 10<L>    Ca    9.0      04-10  Mg     2.2     04-10  Phos  4.9     04-10    TPro  6.1  /  Alb  3.2<L>  /  TBili  0.2  /  DBili  x   /  AST  45<H>  /  ALT  41  /  AlkPhos  159<H>  04-10              Hemoglobin A1C, Whole Blood: 7.7 % <H> [4.0 - 5.6] (04-08-20 @ 08:03)  Hemoglobin A1C, Whole Blood: 8.2 % <H> [4.0 - 5.6] (01-18-20 @ 09:41)

## 2020-04-10 NOTE — PROGRESS NOTE ADULT - PROBLEM SELECTOR PLAN 1
-Adjust insulin as thus:  -Humalog 5/5/6 units sq with B/L/D patient is getting steroids.  -Increase Lantus to 17 units sq qhs and small correctional scale tid-ac/qhs  Check bs at 2am (she has hypoglycemia unawareness) and cover with low QHS scale  DISPO: basal/bolus: Basaglar 12 units sq qhs and Humalog: bs : 3 units, above 200: 4 units  -f/u with Dr. Ley, endocrine in [place]

## 2020-04-10 NOTE — DISCHARGE NOTE NURSING/CASE MANAGEMENT/SOCIAL WORK - PATIENT PORTAL LINK FT
You can access the FollowMyHealth Patient Portal offered by [facility] by registering at the following website: [email or web address]. By joining [email or web address]’s FollowMyHealth portal, you will also be able to view your health information using other applications (apps) compatible with our system.

## 2020-04-10 NOTE — PROGRESS NOTE ADULT - ASSESSMENT
64 F PMH CAD Sp PTCA w stents CHF,COPD, CVA, DMT1, ESRD on HD (M,Tu,Th,Sa), Gout, HLD, PVD, Sublcavian Stensosis (L) sp stent. PSH ASD Repair,idania,fem-pop bypass, recurrent admission for hyper/hypoglycemia/DKA, p/w fever, cough       1- ESRD  2- COVID 19 +   3- viral pneumonia   4- hypotension     k in range sob improving therefore hd am    cont midodrine 10 mg tid   plaquenil 200 mg bid   lovenox 40 mg sq  insulin for hyperglycemia   O2 supplement

## 2020-04-10 NOTE — PROGRESS NOTE ADULT - SUBJECTIVE AND OBJECTIVE BOX
Patient is a 64y old  Female who presents with a chief complaint of fever, cough, encephalopathy. (10 Apr 2020 18:32)      SUBJECTIVE / OVERNIGHT EVENTS: feels better  Review of Systems  chest pain no  palpitations no  sob no  nausea no  headache no    MEDICATIONS  (STANDING):  aspirin enteric coated 81 milliGRAM(s) Oral daily  atorvastatin 80 milliGRAM(s) Oral at bedtime  cefTRIAXone   IVPB 1000 milliGRAM(s) IV Intermittent every 24 hours  clopidogrel Tablet 75 milliGRAM(s) Oral daily  dextrose 5%. 1000 milliLiter(s) (50 mL/Hr) IV Continuous <Continuous>  dextrose 50% Injectable 12.5 Gram(s) IV Push once  dextrose 50% Injectable 25 Gram(s) IV Push once  dextrose 50% Injectable 25 Gram(s) IV Push once  dextrose 50% Injectable 12.5 Gram(s) IV Push once  heparin  Injectable 5000 Unit(s) SubCutaneous every 12 hours  insulin glargine Injectable (LANTUS) 16 Unit(s) SubCutaneous at bedtime  insulin lispro (HumaLOG) corrective regimen sliding scale   SubCutaneous at bedtime  insulin lispro (HumaLOG) corrective regimen sliding scale   SubCutaneous <User Schedule>  insulin lispro (HumaLOG) corrective regimen sliding scale   SubCutaneous three times a day before meals  insulin lispro Injectable (HumaLOG) 5 Unit(s) SubCutaneous before breakfast  insulin lispro Injectable (HumaLOG) 5 Unit(s) SubCutaneous before lunch  insulin lispro Injectable (HumaLOG) 6 Unit(s) SubCutaneous before dinner  melatonin 3 milliGRAM(s) Oral once  metoprolol succinate ER 50 milliGRAM(s) Oral daily  midodrine. 10 milliGRAM(s) Oral three times a day  pantoprazole    Tablet 40 milliGRAM(s) Oral before breakfast  sevelamer carbonate 800 milliGRAM(s) Oral three times a day with meals  zinc sulfate 220 milliGRAM(s) Oral daily    MEDICATIONS  (PRN):  acetaminophen   Tablet .. 650 milliGRAM(s) Oral every 4 hours PRN Temp greater or equal to 38C (100.4F), Mild Pain (1 - 3), Moderate Pain (4 - 6), Severe Pain (7 - 10)  ALBUTerol    90 MICROgram(s) HFA Inhaler 2 Puff(s) Inhalation every 4 hours PRN Shortness of Breath and/or Wheezing  benzonatate 100 milliGRAM(s) Oral three times a day PRN Cough  dextrose 40% Gel 15 Gram(s) Oral once PRN Blood Glucose LESS THAN 70 milliGRAM(s)/deciliter  glucagon  Injectable 1 milliGRAM(s) IntraMuscular once PRN Glucose LESS THAN 70 milligrams/deciliter  glucagon  Injectable 1 milliGRAM(s) IntraMuscular once PRN Glucose LESS THAN 70 milligrams/deciliter  guaiFENesin   Syrup  (Sugar-Free) 200 milliGRAM(s) Oral every 6 hours PRN Cough  ondansetron Injectable 4 milliGRAM(s) IV Push every 12 hours PRN Nausea and/or Vomiting      Vital Signs Last 24 Hrs  T(C): 36.8 (10 Apr 2020 13:58), Max: 36.8 (10 Apr 2020 13:58)  T(F): 98.2 (10 Apr 2020 13:58), Max: 98.2 (10 Apr 2020 13:58)  HR: 66 (10 Apr 2020 13:58) (60 - 66)  BP: 119/56 (10 Apr 2020 13:58) (119/56 - 119/64)  BP(mean): 81 (10 Apr 2020 13:58) (81 - 81)  RR: 22 (10 Apr 2020 13:58) (18 - 22)  SpO2: 95% (10 Apr 2020 13:58) (94% - 99%)    PHYSICAL EXAM:  GENERAL: NAD, well-developed  HEAD:  Atraumatic, Normocephalic  EYES: EOMI, PERRLA, conjunctiva and sclera clear  NECK: Supple, No JVD  CHEST/LUNG: Clear to auscultation bilaterally; No wheeze  HEART: Regular rate and rhythm; No murmurs, rubs, or gallops  ABDOMEN: Soft, Nontender, Nondistended; Bowel sounds present  EXTREMITIES:  2+ Peripheral Pulses, No clubbing, cyanosis, or edema  PSYCH: AAOx3  NEUROLOGY: non-focal  SKIN: No rashes or lesions    LABS:                        9.0    8.64  )-----------( 277      ( 10 Apr 2020 06:38 )             27.9     04-10    132<L>  |  88<L>  |  47<H>  ----------------------------<  232<H>  4.5   |  27  |  4.49<H>    Ca    9.0      10 Apr 2020 06:38  Phos  4.9     04-10  Mg     2.2     04-10    TPro  6.1  /  Alb  3.2<L>  /  TBili  0.2  /  DBili  x   /  AST  45<H>  /  ALT  41  /  AlkPhos  159<H>  04-10                RADIOLOGY & ADDITIONAL TESTS:    Imaging Personally Reviewed:    Consultant(s) Notes Reviewed:      Care Discussed with Consultants/Other Providers:

## 2020-04-10 NOTE — PROGRESS NOTE ADULT - SUBJECTIVE AND OBJECTIVE BOX
[facility]   [phone]  RENAL FOLLOW UP NOTE  --------------------------------------------------------------------------------  Chief Complaint:    24 hour events/subjective:    states breathing overall is better. leg pain is better.     PAST HISTORY  --------------------------------------------------------------------------------  No significant changes to PMH, PSH, FHx, SHx, unless otherwise noted    ALLERGIES & MEDICATIONS  --------------------------------------------------------------------------------  Allergies    No Known Allergies    Intolerances      Standing Inpatient Medications  aspirin enteric coated 81 milliGRAM(s) Oral daily  atorvastatin 80 milliGRAM(s) Oral at bedtime  cefTRIAXone   IVPB 1000 milliGRAM(s) IV Intermittent every 24 hours  clopidogrel Tablet 75 milliGRAM(s) Oral daily  dextrose 5%. 1000 milliLiter(s) IV Continuous <Continuous>  dextrose 50% Injectable 12.5 Gram(s) IV Push once  dextrose 50% Injectable 25 Gram(s) IV Push once  dextrose 50% Injectable 25 Gram(s) IV Push once  dextrose 50% Injectable 12.5 Gram(s) IV Push once  heparin  Injectable 5000 Unit(s) SubCutaneous every 12 hours  insulin glargine Injectable (LANTUS) 16 Unit(s) SubCutaneous at bedtime  insulin lispro (HumaLOG) corrective regimen sliding scale   SubCutaneous at bedtime  insulin lispro (HumaLOG) corrective regimen sliding scale   SubCutaneous <User Schedule>  insulin lispro (HumaLOG) corrective regimen sliding scale   SubCutaneous three times a day before meals  insulin lispro Injectable (HumaLOG) 5 Unit(s) SubCutaneous before breakfast  insulin lispro Injectable (HumaLOG) 5 Unit(s) SubCutaneous before lunch  insulin lispro Injectable (HumaLOG) 6 Unit(s) SubCutaneous before dinner  melatonin 3 milliGRAM(s) Oral once  metoprolol succinate ER 50 milliGRAM(s) Oral daily  midodrine. 10 milliGRAM(s) Oral three times a day  pantoprazole    Tablet 40 milliGRAM(s) Oral before breakfast  sevelamer carbonate 800 milliGRAM(s) Oral three times a day with meals  zinc sulfate 220 milliGRAM(s) Oral daily    PRN Inpatient Medications  acetaminophen   Tablet .. 650 milliGRAM(s) Oral every 4 hours PRN  ALBUTerol    90 MICROgram(s) HFA Inhaler 2 Puff(s) Inhalation every 4 hours PRN  benzonatate 100 milliGRAM(s) Oral three times a day PRN  dextrose 40% Gel 15 Gram(s) Oral once PRN  glucagon  Injectable 1 milliGRAM(s) IntraMuscular once PRN  glucagon  Injectable 1 milliGRAM(s) IntraMuscular once PRN  guaiFENesin   Syrup  (Sugar-Free) 200 milliGRAM(s) Oral every 6 hours PRN  ondansetron Injectable 4 milliGRAM(s) IV Push every 12 hours PRN      REVIEW OF SYSTEMS  --------------------------------------------------------------------------------    Gen: denies  fevers/chills,  CVS: denies chest pain/palpitations  Resp: +  SOB/Cough +   GI: Denies N/V/Abd pain  : Denies dysuria    All other systems were reviewed and are negative, except as noted.    VITALS/PHYSICAL EXAM  --------------------------------------------------------------------------------  T(C): 36.8 (04-10-20 @ 13:58), Max: 36.8 (04-10-20 @ 13:58)  HR: 66 (04-10-20 @ 13:58) (60 - 78)  BP: 119/56 (04-10-20 @ 13:58) (119/56 - 135/65)  RR: 22 (04-10-20 @ 13:58) (18 - 22)  SpO2: 95% (04-10-20 @ 13:58) (94% - 99%)  Wt(kg): --        04-09-20 @ 07:01  -  04-10-20 @ 07:00  --------------------------------------------------------  IN: 960 mL / OUT: 1 mL / NET: 959 mL      Physical Exam:  	    due to covid 19 isolation exam reliability per primary team   	  LABS/STUDIES  --------------------------------------------------------------------------------              9.0    8.64  >-----------<  277      [04-10-20 @ 06:38]              27.9     132  |  88  |  47  ----------------------------<  232      [04-10-20 @ 06:38]  4.5   |  27  |  4.49        Ca     9.0     [04-10-20 @ 06:38]      Mg     2.2     [04-10-20 @ 06:38]      Phos  4.9     [04-10-20 @ 06:38]    TPro  6.1  /  Alb  3.2  /  TBili  0.2  /  DBili  x   /  AST  45  /  ALT  41  /  AlkPhos  159  [04-10-20 @ 06:38]              [04-09-20 @ 06:52]    Creatinine Trend:  SCr 4.49 [04-10 @ 06:38]  SCr 3.23 [04-09 @ 06:52]  SCr 4.35 [04-08 @ 07:02]  SCr 3.35 [04-07 @ 18:06]  SCr 6.49 [04-06 @ 19:29]                  Iron 67, TIBC 234, %sat 29      [12-04-19 @ 08:38]  Ferritin 6405      [04-09-20 @ 08:19]  PTH -- (Ca 8.3)      [12-04-19 @ 08:38]   952  PTH -- (Ca 9.6)      [06-17-19 @ 18:06]   177  HbA1c 7.7      [04-08-20 @ 08:03]  TSH 1.78      [11-14-19 @ 09:15]  Lipid: chol 108, TG 76, HDL 57, LDL 36      [11-14-19 @ 09:16]

## 2020-04-10 NOTE — PROGRESS NOTE ADULT - ASSESSMENT
64 F PMH CAD Sp PTCA w stents CHF,COPD, CVA, DMT1, ESRD on HD (M,Tu,Th,Sa), Gout, HLD, PVD, Sublcavian Stensosis (L) sp stent. PSH ASD Repair,idania,fem-pop bypass, recurrent admission for hyper/hypoglycemia/DKA, p/w fever, cough and encephalopathy, found to be in septic shock with hypoxic respiratory failure, acute STEMI suspected 2/2 possible in stent re-stenosis exacerbated by viral illness/tachycardia, and with hyperglycemia/elevated anion gap with compensated acidosis.     Pneumonia due to 2019 novel coronavirus.   - fever, cough, sob, encephalopathy; presents with septic shock and hypoxia, found with lymphopenia, elevated lactate/procal/ldh/ddimer, and cxr showing b/l opacities, COVID19 PCR  positive.   -airborne + contact isolation  -Supplemental oxygen as required. Can escalate to NRB if required, would avoid bilevel support at this time.  -ID eval . Ceftriaxone.  -c/w plaquenil qtc noted 498 would avoid azithro  -patient with concurrent copd; s/p solumedrol 40 iv x 1 in ER. unclear if true copd exacerbation, no significant wheeze but moving air poorly b/l.  Will continue 1mg/kg solu-medrol divided bid; solu-medrol 20 IV bid x 3 days and reassess  -MDI albuterol prn  -given elevated lactate and procal and multiple comorbidities + septic shock, seems reasonable to continue with empiric coverage of bacterial superinfection.  c/w ceftriaxone for now, hold azithro for elevated qtc  -check legionella  -zofran, robitussin, tessalon, tylenol prn.     Septic shock.  - fever, tachycardia, tachypnea, hypotension, elevated lactate. source is covid19 pna  -c/w mgt as noted above.   -c/w midodrine 10 tid  -can give small volume bolus  -patient confirmed DNR/DNI; no clear benefit for pressor therapy in setting of active STEMI that is not being intervened on, septic shock, covid19 pna in patient who is former smoker and with copd, with other significant mobidities such as ESRD on dialysis.   -encephalopathic from acute illness, with map ~65.     Acute respiratory failure with hypoxia.  - 2/2 covid 19 pna  - Supplemental oxygen as required. Can escalate to NRB if required, would avoid bilevel support at this time.     ST elevation myocardial infarction (STEMI), unspecified artery.   - patient presenting with VENUS inferior leads and reciprocal changes and initial . denies cp or palps.   -cardiology follow.    -pt started on heparin gtt full dose in ER prior to medicine team involvement.  Will change dosing to ACS protocol, as the elevated ddimer is unlikely to represent true PE and rather is fairly typical of acute covid19 presentations.    -c/w aspirin, plavix, increase statin to atorva 80 (monitor LFT as pt has mild transaminitis likely viral/covid mediated), c/w bb  -f/u further cards recs    ESRD on hemodialysis.   -c/w sevelemer  -c/w midodrine  -renal follow.     Type 2 diabetes mellitus with hyperglycemia, with long-term current use of insulin.  -pt with fsg in 400s, and now on short course steroids for covid19/copd  -pt taking Lantus 8/humalog 4 premeal at home  -will increase basal bolus as follows, similar to prior hospitalization: Lantus 13, + HISS  -endo eval  -currently pt has compensated multifactorial met.acidosis; bicarb >18 and pH nearly normal.  AG Is noted 29 but with elevated lactate is likely multifactorial. Check BHB. Will dose basal insulin now and monitor AG.    Advanced care planning/counseling discussion.  - pt is CONFIRMED TO BE DNR/ DNI.   - trial of IV fluids/abx acceptable.      Prophylactic measure.   - Heparin    Pierce Viera MD pager [phone]

## 2020-04-10 NOTE — PROGRESS NOTE ADULT - SUBJECTIVE AND OBJECTIVE BOX
CC: f/u for  covid  Patient reports she is feeling fine, would love to have oxygen at home    REVIEW OF SYSTEMS:  All other review of systems negative (Comprehensive ROS)    Antimicrobials Day #  :4/5  cefTRIAXone   IVPB 1000 milliGRAM(s) IV Intermittent every 24 hours    Other Medications Reviewed    T(F): 97.8 (04-10-20 @ 05:41), Max: 98.4 (04-09-20 @ 16:56)  HR: 63 (04-10-20 @ 11:42)  BP: 119/64 (04-10-20 @ 05:41)  RR: 20 (04-10-20 @ 11:42)  SpO2: 94% (04-10-20 @ 11:42)  Wt(kg): --    PHYSICAL EXAM:  General: alert, no acute distress  Eyes:  anicteric, no conjunctival injection, no discharge  Oropharynx: no lesions or injection 	  Neck: supple, without adenopathy  Lungs: distant bs to auscultation  Heart: regular rate and rhythm; no murmur, rubs or gallops  Abdomen: soft, nondistended, nontender, without mass or organomegaly  Skin: no lesions  Extremities: no clubbing, cyanosis, or edema  Neurologic: alert, oriented, moves all extremities    LAB RESULTS:                        9.0    8.64  )-----------( 277      ( 10 Apr 2020 06:38 )             27.9     04-10    132<L>  |  88<L>  |  47<H>  ----------------------------<  232<H>  4.5   |  27  |  4.49<H>    Ca    9.0      10 Apr 2020 06:38  Phos  4.9     04-10  Mg     2.2     04-10    TPro  6.1  /  Alb  3.2<L>  /  TBili  0.2  /  DBili  x   /  AST  45<H>  /  ALT  41  /  AlkPhos  159<H>  04-10    LIVER FUNCTIONS - ( 10 Apr 2020 06:38 )  Alb: 3.2 g/dL / Pro: 6.1 g/dL / ALK PHOS: 159 U/L / ALT: 41 U/L / AST: 45 U/L / GGT: x             MICROBIOLOGY:  RECENT CULTURES:      RADIOLOGY REVIEWED:    < from: Xray Chest 1 View AP/PA (04.07.20 @ 17:51) >  EXAM:  XR CHEST AP OR PA 1V                            PROCEDURE DATE:  04/07/2020            INTERPRETATION:  HISTORY: Shortness of breath and cough    TECHNIQUE: Portable frontal chest x-ray    COMPARISON: Chest x-ray from 4/5/2020    FINDINGS:    Redemonstration of bilateral peripheral opacities..  There is no pneumothorax. There are no pleural effusions.   The cardiomediastinal silhouette cannot be adequately assessed on this projection.    IMPRESSION:   Redemonstration of bilateral peripheral opacities, differential includes infectious consolidation such as COVID 19.           < end of copied text >    Assessment:  Patient with esrd on dialysis,  admitted with fever, lethargy, positive for covid. Has long qtc so no plaquenil. She is no oxygenating on RA over 90%. She is finishing a course of ctx for concern of superimposed severe sepsis given her unstable presentation  Plan:  1 more day of ceftriaxone  monitor oxygenation  monitor inflammatory markers

## 2020-04-10 NOTE — PROGRESS NOTE ADULT - SUBJECTIVE AND OBJECTIVE BOX
Cardiovascular Disease Progress Note    Overnight events: hep gtt d/c'd. brief episode of nsvt yest. remains hd stable   Otherwise review of systems negative    Objective Findings:  T(C): 36.6 (04-10-20 @ 05:41), Max: 36.9 (04-09-20 @ 16:56)  HR: 60 (04-10-20 @ 05:41) (60 - 78)  BP: 119/64 (04-10-20 @ 05:41) (119/64 - 150/70)  RR: 18 (04-10-20 @ 05:41) (18 - 20)  SpO2: 99% (04-10-20 @ 05:41) (93% - 99%)  Wt(kg): --  Daily     Daily       Telemetry: nsr nsvt    Laboratory Data:                        9.0    8.64  )-----------( 277      ( 10 Apr 2020 06:38 )             27.9     04-10    132<L>  |  88<L>  |  47<H>  ----------------------------<  232<H>  4.5   |  27  |  4.49<H>    Ca    9.0      10 Apr 2020 06:38  Phos  4.9     04-10  Mg     2.2     04-10    TPro  6.1  /  Alb  3.2<L>  /  TBili  0.2  /  DBili  x   /  AST  45<H>  /  ALT  41  /  AlkPhos  159<H>  04-10              Inpatient Medications:  MEDICATIONS  (STANDING):  aspirin enteric coated 81 milliGRAM(s) Oral daily  atorvastatin 80 milliGRAM(s) Oral at bedtime  cefTRIAXone   IVPB 1000 milliGRAM(s) IV Intermittent every 24 hours  clopidogrel Tablet 75 milliGRAM(s) Oral daily  dextrose 5%. 1000 milliLiter(s) (50 mL/Hr) IV Continuous <Continuous>  dextrose 50% Injectable 12.5 Gram(s) IV Push once  dextrose 50% Injectable 25 Gram(s) IV Push once  dextrose 50% Injectable 25 Gram(s) IV Push once  dextrose 50% Injectable 12.5 Gram(s) IV Push once  heparin  Injectable 5000 Unit(s) SubCutaneous every 12 hours  insulin glargine Injectable (LANTUS) 16 Unit(s) SubCutaneous at bedtime  insulin lispro (HumaLOG) corrective regimen sliding scale   SubCutaneous at bedtime  insulin lispro (HumaLOG) corrective regimen sliding scale   SubCutaneous <User Schedule>  insulin lispro (HumaLOG) corrective regimen sliding scale   SubCutaneous three times a day before meals  insulin lispro Injectable (HumaLOG) 4 Unit(s) SubCutaneous before breakfast  insulin lispro Injectable (HumaLOG) 4 Unit(s) SubCutaneous before lunch  insulin lispro Injectable (HumaLOG) 5 Unit(s) SubCutaneous before dinner  melatonin 3 milliGRAM(s) Oral once  methylPREDNISolone sodium succinate Injectable 20 milliGRAM(s) IV Push two times a day  metoprolol succinate ER 50 milliGRAM(s) Oral daily  midodrine. 10 milliGRAM(s) Oral three times a day  pantoprazole    Tablet 40 milliGRAM(s) Oral before breakfast  sevelamer carbonate 800 milliGRAM(s) Oral three times a day with meals  zinc sulfate 220 milliGRAM(s) Oral daily      Assessment:  -elevated troponin  -possible acute inf stemi  -novel coronavirus/sars-cov2 infection  -NSVT  -ischemic cardiomyopathy c/b mod to severe LV dysfunction, cad s/p multiple stents  -esrd on hd  -PAD s/p prior intervention       Recs:  -possible inf stemi although not totally convinced given nonspecifc sx and hs trop at baseline - would expect greater degree of troponin elevation if truly acute inf stemi and patient without any ischemic sx at present. a/w holding off on [facility] for now given overall poor prognosis, unlikely to  at this time. as hep gtt can potentially lead to adverse outcomes in setting of novel coronavirus, after discussion with dr blevins will d/c hep gtt for now. cont to treat medically with anti-platelet agents and statin  -c/w supportive care for covid-19. not a candidate for hydroxychloroquine due to prolonged qtc and ischemic heart disease and hx of ventricular arrhtyhmias  -resume gdmt for lv dysfunction as bp allows. currently on midodrine  -c/w toprol for nsvt/lv dysfunction/cad  -known extensive CAD and ICM. s/p [facility] 2/20/2019 --> severe and diffuse instent restenosis along RCA --> unable to stent due to multiple layers of stenting and prior brachytherapy. denies any true ischemic sx at present  -s/p TTE 2019: mod-severe MR, pseudo AS (low flow-low gradient), mod-severe LV dysfunction --> plan is for medical management given surgical risk and patient preference  -c/w asa, plavix, statin for ischemic cardiomyopathy/CAD/PAD        Over 25 minutes spent on total encounter; more than 50% of the visit was spent counseling and/or coordinating care by the attending physician.      Julián Biswas MD   Cardiovascular Disease  [phone]

## 2020-04-10 NOTE — CHART NOTE - NSCHARTNOTEFT_GEN_A_CORE
Nutrition Initial Assessment    Nutrition Consult Received: Yes [   ]  No [x]    Reason for Initial Nutrition Assessment: length of stay assessment    Source of Information: Unable to conduct a fact to face interview due to limited contact restrictions related to pt's medical condition and isolation precautions. Information obtained from a phone call with the pt and from the EMR. Pt with SOB, interview limited.     Admitting Diagnosis: 64y Female admitted for COVID-19    PAST MEDICAL & SURGICAL HISTORY:  COPD (chronic obstructive pulmonary disease)  Localized enlarged lymph nodes  CHF (congestive heart failure): EF 40-45%  Subclavian vein stenosis, left: s/p stent  DKA, type 1: 1/2015  ACS (acute coronary syndrome): 1/2015 - cath revealed 100% ostial stenosis not amenable to PCI - medical management  TIA (transient ischemic attack): x 2 - 8-9 years ago prior to ASD/VSD repair  CAD (coronary artery disease): s/p stents  Gout: past  CVA (cerebral infarction): with no residual, 8 yrs ago, prior to heart surgery - ST memory loss  Peripheral vascular disease: occluded left fem-pop bypass 5/2015  Diabetes mellitus type 1: Insulin Dependent -  ESRD (end stage renal disease): dialysis  M, tue, thursday, saturday  Hyperlipidemia  Status post device closure of ASD: &quot;clamshell&quot;  History of cardiac catheterization: 1/2015 - no intervention  S/P femoral-popliteal bypass surgery: L and R in 2013 with graft; 5/2015 CFA angioplasty left and ileofemoral endarterectomywith vein patch angioplasty of left fem-pop bypass graft  Multiple vascular surgery both leg, left fempop bypass revision 11/2015  AV (arteriovenous fistula): Left AV graft; revision with stent placement 2-3 years ago  S/P cholecystectomy    Subjective Information: Pt reports fair appetite and poor intake, consuming <50% of meals. Denies nausea/vomiting/diarrhea/constipation. Last BM today (4/10) per pt. Denies difficulties chewing/swallowing. Confirmed NKFA.    Pt reports good appetite and intake PTA. Reports following diet restrictions in setting of hemodialysis treatment, endorses limiting sodium, potassium, and phosphorus intake. Unwilling to provide diet recall at this time. Denies oral nutrition supplement use at home. States  pounds (consistent with dosing weight 120.3 pounds). Endorses vitamin/mineral supplementation PTA however unable to identify supplements she takes, none reported as per H&P. Pt unable to further participate in interview at this time due to SOB, made aware RD remains available. Pt declined oral nutrition supplementation, no food preferences at this time.     Of note, pt seen for RD assessment on 1/4/2020. At that time, pt with good appetite PTA and in-house, reported good intake, stated "that she is 'always eating.'" Dry weight at that time 117.2 pounds (1/4/2020) vs current dosing weight 120.3 pounds indicates weight gain, pt likely with weight fluctuations due to fluid shifts, will continue to monitor. Upon previous assessment, pt stated "she checks her BG 6-7 times per day and  and daughter help manage her DM.  States she watches her carbohydrate intake." Most recent HbA1c (7.7% on 4/8). A1c likely influenced by hemodialysis treatment, will continue to monitor blood glucose. At time of previous RD assessment, RD provided HD diet education and provided handouts. Pt made aware RD remains available and will follow-up with diet education as feasible if pt amenable.     Current Nutrition Order: consistent carbohydrate (no snacks)   PO Intake:   Good (%) [   ]    Fair (50-75%) [   ]    Poor (<50%) [x]    Skin Integrity: no pressure injuries per flowsheets   Edema: +2 edema to b/l legs as per flowsheets (assessed 4/9)    Labs: 04-10 Na132 mmol/L<L> Glu 232 mg/dL<H> K+ 4.5 mmol/L Cr  4.49 mg/dL<H> BUN 47 mg/dL<H> Phos 4.9 mg/dL<H> Alb 3.2 g/dL<L> PAB n/a       Hemoglobin A1C, Whole Blood: 7.7 % (04-08-20 @ 08:03)  Hemoglobin A1C, Whole Blood: 8.2 % (01-18-20 @ 09:41)    POCT Blood Glucose.: 227 mg/dL (04-10-20 @ 08:03)  POCT Blood Glucose.: 277 mg/dL (04-10-20 @ 01:58)  POCT Blood Glucose.: 311 mg/dL (04-09-20 @ 21:02)  POCT Blood Glucose.: 374 mg/dL (04-09-20 @ 17:05)    Medications:  MEDICATIONS  (STANDING):  aspirin enteric coated 81 milliGRAM(s) Oral daily  atorvastatin 80 milliGRAM(s) Oral at bedtime  cefTRIAXone   IVPB 1000 milliGRAM(s) IV Intermittent every 24 hours  clopidogrel Tablet 75 milliGRAM(s) Oral daily  dextrose 5%. 1000 milliLiter(s) (50 mL/Hr) IV Continuous <Continuous>  dextrose 50% Injectable 12.5 Gram(s) IV Push once  dextrose 50% Injectable 25 Gram(s) IV Push once  dextrose 50% Injectable 25 Gram(s) IV Push once  dextrose 50% Injectable 12.5 Gram(s) IV Push once  heparin  Injectable 5000 Unit(s) SubCutaneous every 12 hours  insulin glargine Injectable (LANTUS) 16 Unit(s) SubCutaneous at bedtime  insulin lispro (HumaLOG) corrective regimen sliding scale   SubCutaneous at bedtime  insulin lispro (HumaLOG) corrective regimen sliding scale   SubCutaneous <User Schedule>  insulin lispro (HumaLOG) corrective regimen sliding scale   SubCutaneous three times a day before meals  insulin lispro Injectable (HumaLOG) 4 Unit(s) SubCutaneous before breakfast  insulin lispro Injectable (HumaLOG) 4 Unit(s) SubCutaneous before lunch  insulin lispro Injectable (HumaLOG) 5 Unit(s) SubCutaneous before dinner  melatonin 3 milliGRAM(s) Oral once  methylPREDNISolone sodium succinate Injectable 20 milliGRAM(s) IV Push two times a day  metoprolol succinate ER 50 milliGRAM(s) Oral daily  midodrine. 10 milliGRAM(s) Oral three times a day  pantoprazole    Tablet 40 milliGRAM(s) Oral before breakfast  sevelamer carbonate 800 milliGRAM(s) Oral three times a day with meals  zinc sulfate 220 milliGRAM(s) Oral daily    MEDICATIONS  (PRN):  acetaminophen   Tablet .. 650 milliGRAM(s) Oral every 4 hours PRN Temp greater or equal to 38C (100.4F), Mild Pain (1 - 3), Moderate Pain (4 - 6), Severe Pain (7 - 10)  ALBUTerol    90 MICROgram(s) HFA Inhaler 2 Puff(s) Inhalation every 4 hours PRN Shortness of Breath and/or Wheezing  benzonatate 100 milliGRAM(s) Oral three times a day PRN Cough  dextrose 40% Gel 15 Gram(s) Oral once PRN Blood Glucose LESS THAN 70 milliGRAM(s)/deciliter  glucagon  Injectable 1 milliGRAM(s) IntraMuscular once PRN Glucose LESS THAN 70 milligrams/deciliter  glucagon  Injectable 1 milliGRAM(s) IntraMuscular once PRN Glucose LESS THAN 70 milligrams/deciliter  guaiFENesin   Syrup  (Sugar-Free) 200 milliGRAM(s) Oral every 6 hours PRN Cough  ondansetron Injectable 4 milliGRAM(s) IV Push every 12 hours PRN Nausea and/or Vomiting    Ht: 64 inches (162.6 cm) Wt: 120.3 pounds (54.7 kg)  BMI: 20.7 kg/m2  IBW: 120 pounds +/-10% %IBW: 100%    Nutrition Focused Physical Exam: Unable to complete due to limited isolation contact precautions at this time.     Estimated Energy Needs (30 kcal/kg- 35 kcal/kg): 8097-2771 kcal/day  Estimated Protein Needs (1.2 g/kg- 1.4 g/kg): 66-77 g/day  Based on weight of: dosing weight 54.7 kg     [x] Nutrition Diagnosis:  Inadequate protein-energy intake related to decreased appetite in setting of acute illness (COVID-19) as evidenced by report of pt with suboptimal intake (<50% of meals).   Increased nutrient needs (calories, protein) related to increased physiological demand as evidenced by hemodialysis.  [  ] No active nutrition diagnosis at this time  [  ] Current medical condition precludes nutrition intervention    Goal: Pt to meet >75% of estimated nutritional needs during hospital stay.     Nutrition Interventions:   Recommendations:  1) Continue current diet: consistent carbohydrate (no snacks)  - Monitor need for Renal restrictions in setting of HD, if PO intake improves. Noted with elevated phosphorus, on Renvela. If phosphorus continues to be elevated, consider No Concentrated Phosphorus diet restriction.   2) Suggest Nephrovite supplementation as medically feasible to optimize nutrient intake   3) RD to continue to obtain/honor food preferences as feasible, pt made aware RD remains available    Monitor PO intake, weight, labs, skin, GI status, diet   RD to follow-up per protocol.  Teagan Sanders, MS, RD, CDN Pager #[phone]

## 2020-04-10 NOTE — PROGRESS NOTE ADULT - ASSESSMENT
65 y/o female with poorly controlled brittle T1DM (A1c 7.7%) complicated with ESRD on HD, CAD, TIA, PAD here with severe COVID-19 infection, lactic acidosis, high anion gap metabolic acidosis, and severe hyperglycemia in the setting of steroids. Patient also with COPD, HTN, HLD.

## 2020-04-11 LAB
ALBUMIN SERPL ELPH-MCNC: 3.2 G/DL — LOW (ref 3.3–5)
ALP SERPL-CCNC: 168 U/L — HIGH (ref 40–120)
ALT FLD-CCNC: 52 U/L — HIGH (ref 10–45)
ANION GAP SERPL CALC-SCNC: 20 MMOL/L — HIGH (ref 5–17)
AST SERPL-CCNC: 57 U/L — HIGH (ref 10–40)
BILIRUB SERPL-MCNC: 0.3 MG/DL — SIGNIFICANT CHANGE UP (ref 0.2–1.2)
BUN SERPL-MCNC: 67 MG/DL — HIGH (ref 7–23)
CALCIUM SERPL-MCNC: 8.9 MG/DL — SIGNIFICANT CHANGE UP (ref 8.4–10.5)
CHLORIDE SERPL-SCNC: 85 MMOL/L — LOW (ref 96–108)
CO2 SERPL-SCNC: 22 MMOL/L — SIGNIFICANT CHANGE UP (ref 22–31)
CREAT SERPL-MCNC: 5.58 MG/DL — HIGH (ref 0.5–1.3)
CRP SERPL-MCNC: 6.98 MG/DL — HIGH (ref 0–0.4)
D DIMER BLD IA.RAPID-MCNC: 572 NG/ML DDU — HIGH
D DIMER BLD IA.RAPID-MCNC: 575 NG/ML DDU — HIGH
FERRITIN SERPL-MCNC: 5140 NG/ML — HIGH (ref 15–150)
GLUCOSE BLDC GLUCOMTR-MCNC: 242 MG/DL — HIGH (ref 70–99)
GLUCOSE BLDC GLUCOMTR-MCNC: 254 MG/DL — HIGH (ref 70–99)
GLUCOSE BLDC GLUCOMTR-MCNC: 288 MG/DL — HIGH (ref 70–99)
GLUCOSE BLDC GLUCOMTR-MCNC: 289 MG/DL — HIGH (ref 70–99)
GLUCOSE BLDC GLUCOMTR-MCNC: 299 MG/DL — HIGH (ref 70–99)
GLUCOSE SERPL-MCNC: 287 MG/DL — HIGH (ref 70–99)
POTASSIUM SERPL-MCNC: 5 MMOL/L — SIGNIFICANT CHANGE UP (ref 3.5–5.3)
POTASSIUM SERPL-SCNC: 5 MMOL/L — SIGNIFICANT CHANGE UP (ref 3.5–5.3)
PROT SERPL-MCNC: 6.5 G/DL — SIGNIFICANT CHANGE UP (ref 6–8.3)
SODIUM SERPL-SCNC: 127 MMOL/L — LOW (ref 135–145)

## 2020-04-11 RX ORDER — INSULIN LISPRO 100/ML
5 VIAL (ML) SUBCUTANEOUS
Refills: 0 | Status: DISCONTINUED | OUTPATIENT
Start: 2020-04-11 | End: 2020-04-11

## 2020-04-11 RX ORDER — OXYCODONE HYDROCHLORIDE 5 MG/1
5 TABLET ORAL ONCE
Refills: 0 | Status: DISCONTINUED | OUTPATIENT
Start: 2020-04-11 | End: 2020-04-11

## 2020-04-11 RX ORDER — INSULIN LISPRO 100/ML
3 VIAL (ML) SUBCUTANEOUS
Refills: 0 | Status: DISCONTINUED | OUTPATIENT
Start: 2020-04-11 | End: 2020-04-12

## 2020-04-11 RX ORDER — INSULIN LISPRO 100/ML
4 VIAL (ML) SUBCUTANEOUS
Refills: 0 | Status: DISCONTINUED | OUTPATIENT
Start: 2020-04-11 | End: 2020-04-12

## 2020-04-11 RX ORDER — OXYCODONE AND ACETAMINOPHEN 5; 325 MG/1; MG/1
1 TABLET ORAL EVERY 6 HOURS
Refills: 0 | Status: DISCONTINUED | OUTPATIENT
Start: 2020-04-11 | End: 2020-04-12

## 2020-04-11 RX ORDER — INSULIN GLARGINE 100 [IU]/ML
13 INJECTION, SOLUTION SUBCUTANEOUS AT BEDTIME
Refills: 0 | Status: DISCONTINUED | OUTPATIENT
Start: 2020-04-11 | End: 2020-04-12

## 2020-04-11 RX ORDER — ERYTHROPOIETIN 10000 [IU]/ML
10000 INJECTION, SOLUTION INTRAVENOUS; SUBCUTANEOUS ONCE
Refills: 0 | Status: COMPLETED | OUTPATIENT
Start: 2020-04-11 | End: 2020-04-11

## 2020-04-11 RX ORDER — INSULIN LISPRO 100/ML
3 VIAL (ML) SUBCUTANEOUS
Refills: 0 | Status: DISCONTINUED | OUTPATIENT
Start: 2020-04-12 | End: 2020-04-12

## 2020-04-11 RX ADMIN — CEFTRIAXONE 100 MILLIGRAM(S): 500 INJECTION, POWDER, FOR SOLUTION INTRAMUSCULAR; INTRAVENOUS at 23:28

## 2020-04-11 RX ADMIN — Medication 2: at 07:37

## 2020-04-11 RX ADMIN — Medication 2: at 12:28

## 2020-04-11 RX ADMIN — Medication 5 UNIT(S): at 09:10

## 2020-04-11 RX ADMIN — PANTOPRAZOLE SODIUM 40 MILLIGRAM(S): 20 TABLET, DELAYED RELEASE ORAL at 05:06

## 2020-04-11 RX ADMIN — Medication 650 MILLIGRAM(S): at 05:15

## 2020-04-11 RX ADMIN — HEPARIN SODIUM 5000 UNIT(S): 5000 INJECTION INTRAVENOUS; SUBCUTANEOUS at 18:10

## 2020-04-11 RX ADMIN — SEVELAMER CARBONATE 800 MILLIGRAM(S): 2400 POWDER, FOR SUSPENSION ORAL at 18:10

## 2020-04-11 RX ADMIN — CLOPIDOGREL BISULFATE 75 MILLIGRAM(S): 75 TABLET, FILM COATED ORAL at 12:26

## 2020-04-11 RX ADMIN — OXYCODONE HYDROCHLORIDE 5 MILLIGRAM(S): 5 TABLET ORAL at 06:37

## 2020-04-11 RX ADMIN — MIDODRINE HYDROCHLORIDE 10 MILLIGRAM(S): 2.5 TABLET ORAL at 05:06

## 2020-04-11 RX ADMIN — SEVELAMER CARBONATE 800 MILLIGRAM(S): 2400 POWDER, FOR SUSPENSION ORAL at 12:25

## 2020-04-11 RX ADMIN — INSULIN GLARGINE 13 UNIT(S): 100 INJECTION, SOLUTION SUBCUTANEOUS at 23:28

## 2020-04-11 RX ADMIN — Medication 1: at 09:18

## 2020-04-11 RX ADMIN — Medication 50 MILLIGRAM(S): at 05:06

## 2020-04-11 RX ADMIN — Medication 3 UNIT(S): at 12:27

## 2020-04-11 RX ADMIN — ERYTHROPOIETIN 10000 UNIT(S): 10000 INJECTION, SOLUTION INTRAVENOUS; SUBCUTANEOUS at 18:51

## 2020-04-11 RX ADMIN — SEVELAMER CARBONATE 800 MILLIGRAM(S): 2400 POWDER, FOR SUSPENSION ORAL at 09:18

## 2020-04-11 RX ADMIN — Medication 4 UNIT(S): at 18:09

## 2020-04-11 RX ADMIN — ATORVASTATIN CALCIUM 80 MILLIGRAM(S): 80 TABLET, FILM COATED ORAL at 23:28

## 2020-04-11 RX ADMIN — MIDODRINE HYDROCHLORIDE 10 MILLIGRAM(S): 2.5 TABLET ORAL at 12:25

## 2020-04-11 RX ADMIN — Medication 81 MILLIGRAM(S): at 12:25

## 2020-04-11 RX ADMIN — ZINC SULFATE TAB 220 MG (50 MG ZINC EQUIVALENT) 220 MILLIGRAM(S): 220 (50 ZN) TAB at 12:25

## 2020-04-11 RX ADMIN — Medication 1: at 18:07

## 2020-04-11 RX ADMIN — MIDODRINE HYDROCHLORIDE 10 MILLIGRAM(S): 2.5 TABLET ORAL at 18:10

## 2020-04-11 NOTE — PROGRESS NOTE ADULT - ASSESSMENT
64 F PMH CAD Sp PTCA w stents CHF,COPD, CVA, DMT1, ESRD on HD (M,Tu,Th,Sa), Gout, HLD, PVD, Sublcavian Stensosis (L) sp stent. PSH ASD Repair,idania,fem-pop bypass, recurrent admission for hyper/hypoglycemia/DKA, p/w fever, cough       1- ESRD  2- COVID 19 +   3- viral pneumonia   4- hypotension     hd 3 hr f 180 2 k bath 2 liter  bfr 400 dfr 800   epogen [id number] U   cont midodrine 10 mg tid   plaquenil 200 mg bid   lovenox 40 mg sq  insulin for hyperglycemia   O2 supplement

## 2020-04-11 NOTE — PROGRESS NOTE ADULT - SUBJECTIVE AND OBJECTIVE BOX
[facility]   [phone]  RENAL FOLLOW UP NOTE  --------------------------------------------------------------------------------  Chief Complaint:    24 hour events/subjective:    stated breathing is better but has her pain in left leg     PAST HISTORY  --------------------------------------------------------------------------------  No significant changes to PMH, PSH, FHx, SHx, unless otherwise noted    ALLERGIES & MEDICATIONS  --------------------------------------------------------------------------------  Allergies    No Known Allergies    Intolerances      Standing Inpatient Medications  aspirin enteric coated 81 milliGRAM(s) Oral daily  atorvastatin 80 milliGRAM(s) Oral at bedtime  cefTRIAXone   IVPB 1000 milliGRAM(s) IV Intermittent every 24 hours  clopidogrel Tablet 75 milliGRAM(s) Oral daily  dextrose 5%. 1000 milliLiter(s) IV Continuous <Continuous>  dextrose 50% Injectable 12.5 Gram(s) IV Push once  dextrose 50% Injectable 25 Gram(s) IV Push once  dextrose 50% Injectable 25 Gram(s) IV Push once  dextrose 50% Injectable 12.5 Gram(s) IV Push once  epoetin-azalea-epbx (RETACRIT) Injectable 33109 Unit(s) IV Push once  heparin  Injectable 5000 Unit(s) SubCutaneous every 12 hours  insulin glargine Injectable (LANTUS) 13 Unit(s) SubCutaneous at bedtime  insulin lispro (HumaLOG) corrective regimen sliding scale   SubCutaneous at bedtime  insulin lispro (HumaLOG) corrective regimen sliding scale   SubCutaneous <User Schedule>  insulin lispro (HumaLOG) corrective regimen sliding scale   SubCutaneous three times a day before meals  insulin lispro Injectable (HumaLOG) 3 Unit(s) SubCutaneous before lunch  insulin lispro Injectable (HumaLOG) 4 Unit(s) SubCutaneous before dinner  melatonin 3 milliGRAM(s) Oral once  metoprolol succinate ER 50 milliGRAM(s) Oral daily  midodrine. 10 milliGRAM(s) Oral three times a day  pantoprazole    Tablet 40 milliGRAM(s) Oral before breakfast  sevelamer carbonate 800 milliGRAM(s) Oral three times a day with meals  zinc sulfate 220 milliGRAM(s) Oral daily    PRN Inpatient Medications  acetaminophen   Tablet .. 650 milliGRAM(s) Oral every 4 hours PRN  ALBUTerol    90 MICROgram(s) HFA Inhaler 2 Puff(s) Inhalation every 4 hours PRN  benzonatate 100 milliGRAM(s) Oral three times a day PRN  dextrose 40% Gel 15 Gram(s) Oral once PRN  glucagon  Injectable 1 milliGRAM(s) IntraMuscular once PRN  glucagon  Injectable 1 milliGRAM(s) IntraMuscular once PRN  guaiFENesin   Syrup  (Sugar-Free) 200 milliGRAM(s) Oral every 6 hours PRN  ondansetron Injectable 4 milliGRAM(s) IV Push every 12 hours PRN      REVIEW OF SYSTEMS  --------------------------------------------------------------------------------    Gen: denies  fevers/chills,  CVS: denies chest pain/palpitations  Resp: denies worsening SOB/Cough +   GI: Denies N/V/Abd pain  : Denies dysuria    All other systems were reviewed and are negative, except as noted.    VITALS/PHYSICAL EXAM  --------------------------------------------------------------------------------  T(C): 36.4 (04-11-20 @ 05:00), Max: 36.8 (04-10-20 @ 13:58)  HR: 66 (04-11-20 @ 05:00) (62 - 66)  BP: 134/68 (04-11-20 @ 05:00) (119/56 - 134/68)  RR: 22 (04-11-20 @ 05:00) (22 - 22)  SpO2: 92% (04-11-20 @ 05:00) (92% - 95%)  Wt(kg): --        04-11-20 @ 07:01  -  04-11-20 @ 13:57  --------------------------------------------------------  IN: 480 mL / OUT: 0 mL / NET: 480 mL      Physical Exam:  	    due to covid 19 isolation exam reliability per primary team     LABS/STUDIES  --------------------------------------------------------------------------------              9.0    8.64  >-----------<  277      [04-10-20 @ 06:38]              27.9     127  |  85  |  67  ----------------------------<  287      [04-11-20 @ 06:17]  5.0   |  22  |  5.58        Ca     8.9     [04-11-20 @ 06:17]      Mg     2.2     [04-10-20 @ 06:38]      Phos  4.9     [04-10-20 @ 06:38]    TPro  6.5  /  Alb  3.2  /  TBili  0.3  /  DBili  x   /  AST  57  /  ALT  52  /  AlkPhos  168  [04-11-20 @ 06:17]    	      Creatinine Trend:  SCr 5.58 [04-11 @ 06:17]  SCr 4.49 [04-10 @ 06:38]  SCr 3.23 [04-09 @ 06:52]  SCr 4.35 [04-08 @ 07:02]  SCr 3.35 [04-07 @ 18:06]                  Iron 67, TIBC 234, %sat 29      [12-04-19 @ 08:38]  Ferritin 5140      [04-11-20 @ 10:01]  PTH -- (Ca 8.3)      [12-04-19 @ 08:38]   952  PTH -- (Ca 9.6)      [06-17-19 @ 18:06]   177  HbA1c 7.7      [04-08-20 @ 08:03]  TSH 1.78      [11-14-19 @ 09:15]  Lipid: chol 108, TG 76, HDL 57, LDL 36      [11-14-19 @ 09:16]

## 2020-04-11 NOTE — PROGRESS NOTE ADULT - SUBJECTIVE AND OBJECTIVE BOX
Patient is a 64y old  Female who presents with a chief complaint of fever, cough, encephalopathy. (11 Apr 2020 13:56)      SUBJECTIVE / OVERNIGHT EVENTS: feels better  Review of Systems  chest pain no  palpitations no  sob improving   nausea no  headache no    MEDICATIONS  (STANDING):  aspirin enteric coated 81 milliGRAM(s) Oral daily  atorvastatin 80 milliGRAM(s) Oral at bedtime  cefTRIAXone   IVPB 1000 milliGRAM(s) IV Intermittent every 24 hours  clopidogrel Tablet 75 milliGRAM(s) Oral daily  dextrose 5%. 1000 milliLiter(s) (50 mL/Hr) IV Continuous <Continuous>  dextrose 50% Injectable 12.5 Gram(s) IV Push once  dextrose 50% Injectable 25 Gram(s) IV Push once  dextrose 50% Injectable 25 Gram(s) IV Push once  dextrose 50% Injectable 12.5 Gram(s) IV Push once  heparin  Injectable 5000 Unit(s) SubCutaneous every 12 hours  insulin glargine Injectable (LANTUS) 13 Unit(s) SubCutaneous at bedtime  insulin lispro (HumaLOG) corrective regimen sliding scale   SubCutaneous at bedtime  insulin lispro (HumaLOG) corrective regimen sliding scale   SubCutaneous <User Schedule>  insulin lispro (HumaLOG) corrective regimen sliding scale   SubCutaneous three times a day before meals  insulin lispro Injectable (HumaLOG) 3 Unit(s) SubCutaneous before lunch  insulin lispro Injectable (HumaLOG) 4 Unit(s) SubCutaneous before dinner  melatonin 3 milliGRAM(s) Oral once  metoprolol succinate ER 50 milliGRAM(s) Oral daily  midodrine. 10 milliGRAM(s) Oral three times a day  pantoprazole    Tablet 40 milliGRAM(s) Oral before breakfast  sevelamer carbonate 800 milliGRAM(s) Oral three times a day with meals  zinc sulfate 220 milliGRAM(s) Oral daily    MEDICATIONS  (PRN):  acetaminophen   Tablet .. 650 milliGRAM(s) Oral every 4 hours PRN Temp greater or equal to 38C (100.4F), Mild Pain (1 - 3), Moderate Pain (4 - 6), Severe Pain (7 - 10)  ALBUTerol    90 MICROgram(s) HFA Inhaler 2 Puff(s) Inhalation every 4 hours PRN Shortness of Breath and/or Wheezing  benzonatate 100 milliGRAM(s) Oral three times a day PRN Cough  dextrose 40% Gel 15 Gram(s) Oral once PRN Blood Glucose LESS THAN 70 milliGRAM(s)/deciliter  glucagon  Injectable 1 milliGRAM(s) IntraMuscular once PRN Glucose LESS THAN 70 milligrams/deciliter  glucagon  Injectable 1 milliGRAM(s) IntraMuscular once PRN Glucose LESS THAN 70 milligrams/deciliter  guaiFENesin   Syrup  (Sugar-Free) 200 milliGRAM(s) Oral every 6 hours PRN Cough  ondansetron Injectable 4 milliGRAM(s) IV Push every 12 hours PRN Nausea and/or Vomiting      Vital Signs Last 24 Hrs  T(C): 36.6 (11 Apr 2020 17:45), Max: 36.7 (11 Apr 2020 13:57)  T(F): 97.8 (11 Apr 2020 17:45), Max: 98 (11 Apr 2020 13:57)  HR: 60 (11 Apr 2020 17:45) (60 - 67)  BP: 159/59 (11 Apr 2020 17:45) (130/70 - 159/59)  BP(mean): --  RR: 20 (11 Apr 2020 17:45) (20 - 22)  SpO2: 93% (11 Apr 2020 17:45) (92% - 93%)    PHYSICAL EXAM:  GENERAL: NAD, well-developed  HEAD:  Atraumatic, Normocephalic  EYES: EOMI, PERRLA, conjunctiva and sclera clear  NECK: Supple, No JVD  CHEST/LUNG: few crackles to auscultation bilaterally; No wheeze  HEART: Regular rate and rhythm; No murmurs, rubs, or gallops  ABDOMEN: Soft, Nontender, Nondistended; Bowel sounds present  EXTREMITIES:  2+ Peripheral Pulses, No clubbing, cyanosis, or edema  PSYCH: AAOx3  NEUROLOGY: non-focal  SKIN: No rashes or lesions    LABS:                        9.0    8.64  )-----------( 277      ( 10 Apr 2020 06:38 )             27.9     04-11    127<L>  |  85<L>  |  67<H>  ----------------------------<  287<H>  5.0   |  22  |  5.58<H>    Ca    8.9      11 Apr 2020 06:17  Phos  4.9     04-10  Mg     2.2     04-10    TPro  6.5  /  Alb  3.2<L>  /  TBili  0.3  /  DBili  x   /  AST  57<H>  /  ALT  52<H>  /  AlkPhos  168<H>  04-11                RADIOLOGY & ADDITIONAL TESTS:    Imaging Personally Reviewed:    Consultant(s) Notes Reviewed:      Care Discussed with Consultants/Other Providers:

## 2020-04-11 NOTE — PROGRESS NOTE ADULT - ASSESSMENT
64 F PMH CAD Sp PTCA w stents CHF,COPD, CVA, DMT1, ESRD on HD (M,Tu,Th,Sa), Gout, HLD, PVD, Sublcavian Stensosis (L) sp stent. PSH ASD Repair,idania,fem-pop bypass, recurrent admission for hyper/hypoglycemia/DKA, p/w fever, cough and encephalopathy, found to be in septic shock with hypoxic respiratory failure, acute STEMI suspected 2/2 possible in stent re-stenosis exacerbated by viral illness/tachycardia, and with hyperglycemia/elevated anion gap with compensated acidosis.     Pneumonia due to 2019 novel coronavirus.   - fever, cough, sob, encephalopathy; presents with septic shock and hypoxia, found with lymphopenia, elevated lactate/procal/ldh/ddimer, and cxr showing b/l opacities, COVID19 PCR  positive.   -airborne + contact isolation  -Supplemental oxygen as required. Can escalate to NRB if required, would avoid bilevel support at this time.  -ID eval .   -c/w plaquenil qtc noted 498 would avoid azithro  -patient with concurrent copd; s/p solumedrol 40 iv x 1 in ER. unclear if true copd exacerbation, no significant wheeze but moving air poorly b/l.  Will continue 1mg/kg solu-medrol divided bid; solu-medrol 20 IV bid x 3 days and reassess  -MDI albuterol prn  -given elevated lactate and procal and multiple comorbidities + septic shock, seems reasonable to continue with empiric coverage of bacterial superinfection.  c/w ceftriaxone for now, hold azithro for elevated qtc  -check legionella  -zofran, robitussin, tessalon, tylenol prn.     Septic shock.  - fever, tachycardia, tachypnea, hypotension, elevated lactate. source is covid19 pna  -c/w mgt as noted above.   -c/w midodrine 10 tid  -can give small volume bolus  -patient confirmed DNR/DNI; no clear benefit for pressor therapy in setting of active STEMI that is not being intervened on, septic shock, covid19 pna in patient who is former smoker and with copd, with other significant mobidities such as ESRD on dialysis.   -encephalopathic from acute illness, with map ~65.     Acute respiratory failure with hypoxia.  - 2/2 covid 19 pna  - Supplemental oxygen as required. Can escalate to NRB if required, would avoid bilevel support at this time.     ST elevation myocardial infarction (STEMI), unspecified artery.   - patient presenting with VENUS inferior leads and reciprocal changes and initial . denies cp or palps.   -cardiology follow.    -pt started on heparin gtt full dose in ER prior to medicine team involvement.  Will change dosing to ACS protocol, as the elevated ddimer is unlikely to represent true PE and rather is fairly typical of acute covid19 presentations.    -c/w aspirin, plavix, increase statin to atorva 80 (monitor LFT as pt has mild transaminitis likely viral/covid mediated), c/w bb  -f/u further cards recs    ESRD on hemodialysis.   -c/w sevelemer  -c/w midodrine  -renal follow.     Type 2 diabetes mellitus with hyperglycemia, with long-term current use of insulin.  -pt with fsg in 400s, and now on short course steroids for covid19/copd  -pt taking Lantus 8/humalog 4 premeal at home  -will increase basal bolus as follows, similar to prior hospitalization: Lantus 13, + HISS  -endo eval  -currently pt has compensated multifactorial met.acidosis; bicarb >18 and pH nearly normal.  AG Is noted 29 but with elevated lactate is likely multifactorial. Check BHB. Will dose basal insulin now and monitor AG.    Advanced care planning/counseling discussion.  - pt is CONFIRMED TO BE DNR/ DNI.   - trial of IV fluids/abx acceptable.      Prophylactic measure.   - Heparin    Pierce Viera MD pager [phone]

## 2020-04-11 NOTE — CHART NOTE - NSCHARTNOTEFT_GEN_A_CORE
Diabetes Follow up note: (non-billable visit)    Chief complaint: T1DM    Interval Hx: Glucose values remain in 200s. Last dose of solumedrol given last evening. Possible discharge planning for today.     MEDS:    insulin glargine Injectable (LANTUS) 13 Unit(s) SubCutaneous at bedtime  insulin lispro (HumaLOG) corrective regimen sliding scale   SubCutaneous at bedtime  insulin lispro (HumaLOG) corrective regimen sliding scale   SubCutaneous <User Schedule>  insulin lispro (HumaLOG) corrective regimen sliding scale   SubCutaneous three times a day before meals  insulin lispro Injectable (HumaLOG) 3 Unit(s) SubCutaneous before lunch  insulin lispro Injectable (HumaLOG) 4 Unit(s) SubCutaneous before dinner    cefTRIAXone   IVPB 1000 milliGRAM(s) IV Intermittent every 24 hours    Allergies    No Known Allergies        Vital Signs Last 24 Hrs  T(C): 36.4 (11 Apr 2020 05:00), Max: 36.8 (10 Apr 2020 13:58)  T(F): 97.5 (11 Apr 2020 05:00), Max: 98.2 (10 Apr 2020 13:58)  HR: 66 (11 Apr 2020 05:00) (62 - 66)  BP: 134/68 (11 Apr 2020 05:00) (119/56 - 134/68)  BP(mean): 81 (10 Apr 2020 13:58) (81 - 81)  RR: 22 (11 Apr 2020 05:00) (20 - 22)  SpO2: 92% (11 Apr 2020 05:00) (92% - 95%)      LABS:  POCT Blood Glucose.: 299 mg/dL (04-11-20 @ 11:33)  POCT Blood Glucose.: 288 mg/dL (04-11-20 @ 07:35)  POCT Blood Glucose.: 230 mg/dL (04-10-20 @ 21:34)  POCT Blood Glucose.: 245 mg/dL (04-10-20 @ 16:58)  POCT Blood Glucose.: 254 mg/dL (04-10-20 @ 11:56)  POCT Blood Glucose.: 227 mg/dL (04-10-20 @ 08:03)  POCT Blood Glucose.: 277 mg/dL (04-10-20 @ 01:58)  POCT Blood Glucose.: 311 mg/dL (04-09-20 @ 21:02)  POCT Blood Glucose.: 374 mg/dL (04-09-20 @ 17:05)  POCT Blood Glucose.: 289 mg/dL (04-09-20 @ 11:49)  POCT Blood Glucose.: 287 mg/dL (04-09-20 @ 08:28)  POCT Blood Glucose.: 232 mg/dL (04-09-20 @ 04:43)  POCT Blood Glucose.: 128 mg/dL (04-09-20 @ 00:30)  POCT Blood Glucose.: 109 mg/dL (04-08-20 @ 23:37)  POCT Blood Glucose.: 120 mg/dL (04-08-20 @ 22:41)  POCT Blood Glucose.: 212 mg/dL (04-08-20 @ 20:04)  POCT Blood Glucose.: 243 mg/dL (04-08-20 @ 17:56)  POCT Blood Glucose.: 338 mg/dL (04-08-20 @ 16:01)  POCT Blood Glucose.: 375 mg/dL (04-08-20 @ 14:13)                            9.0    8.64  )-----------( 277      ( 10 Apr 2020 06:38 )             27.9       04-11    127<L>  |  85<L>  |  67<H>  ----------------------------<  287<H>  5.0   |  22  |  5.58<H>    Ca    8.9      11 Apr 2020 06:17  Phos  4.9     04-10  Mg     2.2     04-10    TPro  6.5  /  Alb  3.2<L>  /  TBili  0.3  /  DBili  x   /  AST  57<H>  /  ALT  52<H>  /  AlkPhos  168<H>  04-11      Thyroid Function Tests:      Hemoglobin A1C, Whole Blood: 7.7 % <H> [4.0 - 5.6] (04-08-20 @ 08:03)  Hemoglobin A1C, Whole Blood: 8.2 % <H> [4.0 - 5.6] (01-18-20 @ 09:41)      A/P: 63 y/o female with poorly controlled brittle T1DM (A1c 7.7%) complicated with ESRD on HD, CAD, TIA, PAD here with severe COVID-19 infection, lactic acidosis, high anion gap metabolic acidosis, and severe hyperglycemia in the setting of steroids. Patient also with COPD, HTN, HLD. Now off steroids, will reduce insulin doses.     T1DM (plan):  -test BG AC/HS/2AM  -Decrease Lantus 13 units QHS  -Decrease Humalog 3-3-4 w/meals  -c/w Humalog low correction scales AC/HS/2AM  DISPO: basal/bolus: Basaglar 12 units sq qhs and Humalog: bs : 3 units, above 200: 4 units  -f/u with Dr. Ley, endocrine in [place].     Please call w/any questions  pager: [phone]   cell: [phone]  office: [phone]    Due to [facility] policy during the evolving novel coronavirus outbreak, efforts are being made to limit unnecessary patient contacts to limit the spread of disease.   Accordingly, patients without clear indication for physical exam or face to face interview from the Endocrine team will be adjusted in conjunction with conversations with primary provider team. This is being done for the safety of all the patients we care for.

## 2020-04-11 NOTE — PROGRESS NOTE ADULT - SUBJECTIVE AND OBJECTIVE BOX
Cardiovascular Disease Progress Note    Overnight events: No acute events overnight.  breathing better. no cp/palps/dizziness  Otherwise review of systems negative    Objective Findings:  T(C): 36.4 (04-11-20 @ 05:00), Max: 36.8 (04-10-20 @ 13:58)  HR: 66 (04-11-20 @ 05:00) (62 - 66)  BP: 134/68 (04-11-20 @ 05:00) (119/56 - 134/68)  RR: 22 (04-11-20 @ 05:00) (20 - 22)  SpO2: 92% (04-11-20 @ 05:00) (92% - 95%)  Wt(kg): --  Daily     Daily       Physical Exam:  Gen: NAD  HEENT: EOMI  CV: RRR, normal S1 + S2, no m/r/g  Lungs: CTAB  Abd: soft, non-tender  Ext: No edema    Telemetry: nsr pvc's    Laboratory Data:                        9.0    8.64  )-----------( 277      ( 10 Apr 2020 06:38 )             27.9     04-11    127<L>  |  85<L>  |  67<H>  ----------------------------<  287<H>  5.0   |  22  |  5.58<H>    Ca    8.9      11 Apr 2020 06:17  Phos  4.9     04-10  Mg     2.2     04-10    TPro  6.5  /  Alb  3.2<L>  /  TBili  0.3  /  DBili  x   /  AST  57<H>  /  ALT  52<H>  /  AlkPhos  168<H>  04-11              Inpatient Medications:  MEDICATIONS  (STANDING):  aspirin enteric coated 81 milliGRAM(s) Oral daily  atorvastatin 80 milliGRAM(s) Oral at bedtime  cefTRIAXone   IVPB 1000 milliGRAM(s) IV Intermittent every 24 hours  clopidogrel Tablet 75 milliGRAM(s) Oral daily  dextrose 5%. 1000 milliLiter(s) (50 mL/Hr) IV Continuous <Continuous>  dextrose 50% Injectable 12.5 Gram(s) IV Push once  dextrose 50% Injectable 25 Gram(s) IV Push once  dextrose 50% Injectable 25 Gram(s) IV Push once  dextrose 50% Injectable 12.5 Gram(s) IV Push once  epoetin-azalea-epbx (RETACRIT) Injectable 05569 Unit(s) IV Push once  heparin  Injectable 5000 Unit(s) SubCutaneous every 12 hours  insulin glargine Injectable (LANTUS) 16 Unit(s) SubCutaneous at bedtime  insulin lispro (HumaLOG) corrective regimen sliding scale   SubCutaneous three times a day before meals  insulin lispro (HumaLOG) corrective regimen sliding scale   SubCutaneous at bedtime  insulin lispro (HumaLOG) corrective regimen sliding scale   SubCutaneous <User Schedule>  insulin lispro Injectable (HumaLOG) 5 Unit(s) SubCutaneous before breakfast  insulin lispro Injectable (HumaLOG) 5 Unit(s) SubCutaneous before lunch  insulin lispro Injectable (HumaLOG) 6 Unit(s) SubCutaneous before dinner  melatonin 3 milliGRAM(s) Oral once  metoprolol succinate ER 50 milliGRAM(s) Oral daily  midodrine. 10 milliGRAM(s) Oral three times a day  pantoprazole    Tablet 40 milliGRAM(s) Oral before breakfast  sevelamer carbonate 800 milliGRAM(s) Oral three times a day with meals  zinc sulfate 220 milliGRAM(s) Oral daily      Assessment:  -elevated troponin  -possible acute inf stemi  -novel coronavirus/sars-cov2 infection  -NSVT  -ischemic cardiomyopathy c/b mod to severe LV dysfunction, cad s/p multiple stents  -esrd on hd  -PAD s/p prior intervention       Recs:  -possible inf stemi although not totally convinced given nonspecifc sx and hs trop at baseline - would expect greater degree of troponin elevation if truly acute inf stemi and patient without any ischemic sx at present. a/w holding off on [facility] for now given overall poor prognosis, unlikely to  at this time. as hep gtt can potentially lead to adverse outcomes in setting of novel coronavirus, after discussion with dr blevins will d/c hep gtt for now. cont to treat medically with anti-platelet agents and statin  -c/w supportive care for covid-19. not a candidate for hydroxychloroquine due to prolonged qtc and ischemic heart disease and hx of ventricular arrhtyhmias  -resume gdmt for lv dysfunction as bp allows. currently on midodrine and toprol  -c/w toprol for nsvt/lv dysfunction/cad  -known extensive CAD and ICM. s/p [facility] 2/20/2019 --> severe and diffuse instent restenosis along RCA --> unable to stent due to multiple layers of stenting and prior brachytherapy. denies any true ischemic sx at present  -s/p TTE 2019: mod-severe MR, pseudo AS (low flow-low gradient), mod-severe LV dysfunction --> plan is for medical management given surgical risk and patient preference  -c/w asa, plavix, statin for ischemic cardiomyopathy/CAD/PAD  -discussed with attending      Over 25 minutes spent on total encounter; more than 50% of the visit was spent counseling and/or coordinating care by the attending physician.      Julián Biswas MD   Cardiovascular Disease  [phone]

## 2020-04-12 ENCOUNTER — TRANSCRIPTION ENCOUNTER (OUTPATIENT)
Age: 65
End: 2020-04-12

## 2020-04-12 VITALS
SYSTOLIC BLOOD PRESSURE: 135 MMHG | TEMPERATURE: 98 F | RESPIRATION RATE: 20 BRPM | HEART RATE: 73 BPM | DIASTOLIC BLOOD PRESSURE: 74 MMHG | OXYGEN SATURATION: 93 %

## 2020-04-12 LAB
ALBUMIN SERPL ELPH-MCNC: 3.2 G/DL — LOW (ref 3.3–5)
ALP SERPL-CCNC: 146 U/L — HIGH (ref 40–120)
ALT FLD-CCNC: 50 U/L — HIGH (ref 10–45)
ANION GAP SERPL CALC-SCNC: 18 MMOL/L — HIGH (ref 5–17)
AST SERPL-CCNC: 57 U/L — HIGH (ref 10–40)
BILIRUB SERPL-MCNC: 0.2 MG/DL — SIGNIFICANT CHANGE UP (ref 0.2–1.2)
BUN SERPL-MCNC: 32 MG/DL — HIGH (ref 7–23)
CALCIUM SERPL-MCNC: 8.7 MG/DL — SIGNIFICANT CHANGE UP (ref 8.4–10.5)
CHLORIDE SERPL-SCNC: 93 MMOL/L — LOW (ref 96–108)
CO2 SERPL-SCNC: 26 MMOL/L — SIGNIFICANT CHANGE UP (ref 22–31)
CREAT SERPL-MCNC: 3.9 MG/DL — HIGH (ref 0.5–1.3)
GLUCOSE BLDC GLUCOMTR-MCNC: 101 MG/DL — HIGH (ref 70–99)
GLUCOSE BLDC GLUCOMTR-MCNC: 110 MG/DL — HIGH (ref 70–99)
GLUCOSE BLDC GLUCOMTR-MCNC: 115 MG/DL — HIGH (ref 70–99)
GLUCOSE BLDC GLUCOMTR-MCNC: 310 MG/DL — HIGH (ref 70–99)
GLUCOSE BLDC GLUCOMTR-MCNC: 68 MG/DL — LOW (ref 70–99)
GLUCOSE BLDC GLUCOMTR-MCNC: 82 MG/DL — SIGNIFICANT CHANGE UP (ref 70–99)
GLUCOSE SERPL-MCNC: 74 MG/DL — SIGNIFICANT CHANGE UP (ref 70–99)
HCT VFR BLD CALC: 33.4 % — LOW (ref 34.5–45)
HGB BLD-MCNC: 10.3 G/DL — LOW (ref 11.5–15.5)
MAGNESIUM SERPL-MCNC: 2.2 MG/DL — SIGNIFICANT CHANGE UP (ref 1.6–2.6)
MCHC RBC-ENTMCNC: 30.8 GM/DL — LOW (ref 32–36)
MCHC RBC-ENTMCNC: 32 PG — SIGNIFICANT CHANGE UP (ref 27–34)
MCV RBC AUTO: 103.7 FL — HIGH (ref 80–100)
NRBC # BLD: 0 /100 WBCS — SIGNIFICANT CHANGE UP (ref 0–0)
PHOSPHATE SERPL-MCNC: 3.1 MG/DL — SIGNIFICANT CHANGE UP (ref 2.5–4.5)
PLATELET # BLD AUTO: 341 K/UL — SIGNIFICANT CHANGE UP (ref 150–400)
POTASSIUM SERPL-MCNC: 4.5 MMOL/L — SIGNIFICANT CHANGE UP (ref 3.5–5.3)
POTASSIUM SERPL-SCNC: 4.5 MMOL/L — SIGNIFICANT CHANGE UP (ref 3.5–5.3)
PROT SERPL-MCNC: 6.3 G/DL — SIGNIFICANT CHANGE UP (ref 6–8.3)
RBC # BLD: 3.22 M/UL — LOW (ref 3.8–5.2)
RBC # FLD: 14.1 % — SIGNIFICANT CHANGE UP (ref 10.3–14.5)
SODIUM SERPL-SCNC: 137 MMOL/L — SIGNIFICANT CHANGE UP (ref 135–145)
WBC # BLD: 9.88 K/UL — SIGNIFICANT CHANGE UP (ref 3.8–10.5)
WBC # FLD AUTO: 9.88 K/UL — SIGNIFICANT CHANGE UP (ref 3.8–10.5)

## 2020-04-12 PROCEDURE — 99238 HOSP IP/OBS DSCHRG MGMT 30/<: CPT | Mod: CS

## 2020-04-12 PROCEDURE — 99232 SBSQ HOSP IP/OBS MODERATE 35: CPT

## 2020-04-12 RX ORDER — METOPROLOL TARTRATE 50 MG
1 TABLET ORAL
Qty: 0 | Refills: 0 | DISCHARGE

## 2020-04-12 RX ORDER — INSULIN GLARGINE 100 [IU]/ML
12 INJECTION, SOLUTION SUBCUTANEOUS
Qty: 0 | Refills: 0 | DISCHARGE

## 2020-04-12 RX ORDER — PANTOPRAZOLE SODIUM 20 MG/1
1 TABLET, DELAYED RELEASE ORAL
Qty: 0 | Refills: 0 | DISCHARGE

## 2020-04-12 RX ORDER — INSULIN GLARGINE 100 [IU]/ML
12 INJECTION, SOLUTION SUBCUTANEOUS AT BEDTIME
Refills: 0 | Status: DISCONTINUED | OUTPATIENT
Start: 2020-04-12 | End: 2020-04-12

## 2020-04-12 RX ORDER — APIXABAN 2.5 MG/1
2.5 TABLET, FILM COATED ORAL ONCE
Refills: 0 | Status: COMPLETED | OUTPATIENT
Start: 2020-04-12 | End: 2020-04-12

## 2020-04-12 RX ORDER — APIXABAN 2.5 MG/1
1 TABLET, FILM COATED ORAL
Qty: 60 | Refills: 0
Start: 2020-04-12 | End: 2020-05-11

## 2020-04-12 RX ORDER — INSULIN ASPART 100 [IU]/ML
0 INJECTION, SOLUTION SUBCUTANEOUS
Qty: 0 | Refills: 0 | DISCHARGE

## 2020-04-12 RX ORDER — SEVELAMER CARBONATE 2400 MG/1
1 POWDER, FOR SUSPENSION ORAL
Qty: 90 | Refills: 0
Start: 2020-04-12 | End: 2020-05-11

## 2020-04-12 RX ORDER — ATORVASTATIN CALCIUM 80 MG/1
1 TABLET, FILM COATED ORAL
Qty: 30 | Refills: 0
Start: 2020-04-12 | End: 2020-05-11

## 2020-04-12 RX ORDER — BUDESONIDE, MICRONIZED 100 %
2 POWDER (GRAM) MISCELLANEOUS
Qty: 0 | Refills: 0 | DISCHARGE

## 2020-04-12 RX ORDER — ATORVASTATIN CALCIUM 80 MG/1
1 TABLET, FILM COATED ORAL
Qty: 0 | Refills: 0 | DISCHARGE

## 2020-04-12 RX ORDER — ALBUTEROL 90 UG/1
2 AEROSOL, METERED ORAL
Qty: 0 | Refills: 0 | DISCHARGE
Start: 2020-04-12

## 2020-04-12 RX ADMIN — ZINC SULFATE TAB 220 MG (50 MG ZINC EQUIVALENT) 220 MILLIGRAM(S): 220 (50 ZN) TAB at 12:19

## 2020-04-12 RX ADMIN — SEVELAMER CARBONATE 800 MILLIGRAM(S): 2400 POWDER, FOR SUSPENSION ORAL at 12:18

## 2020-04-12 RX ADMIN — SEVELAMER CARBONATE 800 MILLIGRAM(S): 2400 POWDER, FOR SUSPENSION ORAL at 09:23

## 2020-04-12 RX ADMIN — APIXABAN 2.5 MILLIGRAM(S): 2.5 TABLET, FILM COATED ORAL at 18:40

## 2020-04-12 RX ADMIN — Medication 3 UNIT(S): at 09:22

## 2020-04-12 RX ADMIN — Medication 50 MILLIGRAM(S): at 04:52

## 2020-04-12 RX ADMIN — MIDODRINE HYDROCHLORIDE 10 MILLIGRAM(S): 2.5 TABLET ORAL at 04:53

## 2020-04-12 RX ADMIN — Medication 81 MILLIGRAM(S): at 12:18

## 2020-04-12 RX ADMIN — MIDODRINE HYDROCHLORIDE 10 MILLIGRAM(S): 2.5 TABLET ORAL at 12:18

## 2020-04-12 RX ADMIN — CLOPIDOGREL BISULFATE 75 MILLIGRAM(S): 75 TABLET, FILM COATED ORAL at 12:18

## 2020-04-12 RX ADMIN — PANTOPRAZOLE SODIUM 40 MILLIGRAM(S): 20 TABLET, DELAYED RELEASE ORAL at 04:52

## 2020-04-12 NOTE — PROGRESS NOTE ADULT - NS ED BHA TELEPSYCH PATIENT INTERVIEW PRIVATE SPACE
Patient interviewed in a private space.

## 2020-04-12 NOTE — PROGRESS NOTE ADULT - PROBLEM SELECTOR PLAN 3
-continue atorvastatin 80mg po qhs  -discussed with KALI Mars.  Paris Colbert MD  Pager: [phone]  Nights and Weekends: [phone]
-continue atorvastatin 80mg po qhs  -spent 15 minutes coordinating care with attg and patient's . Asked attg to call as Mr See has questions about DNI status.   Paris Colbert MD  Pager: [phone]  Nights and Weekends: [phone]
-continue atorvastatin 80mg po qhs    Carolina Nixon DO  Pager 9AM-5PM: [phone]  Pager Nights and Weekends: [phone]

## 2020-04-12 NOTE — DISCHARGE NOTE PROVIDER - HOSPITAL COURSE
64 F PMH CAD Sp PTCA w/ stents CHF, COPD, CVA, DMT1, ESRD on HD (M,Tu,Th,Sa), Gout, HLD, PVD, Subclavian Stenosis (L) s/p stent. PSH s/p ASD Repair, s/p idania, s/p fem-pop bypass, recurrent admission for hyper/hypoglycemia/DKA, p/w fever, cough and encephalopathy.  Pt admitted w/a cough and sob x 1 week or more. +fevers. +chills. Had full HD tx today, was found to be more confused afterwards, she also c/o LLE pain w/an episode of diarrhea. Pt's  confirmed history.  Pt denied cp, n/v, jaw pain, back pain, abd pain.      On admission she had  Tm 103.7,  -->118, /60 -->84/53, RR24, & O2 sat 100% 3LNC.  Labs: no leukocytosis, chronic stable anemia, chronic macrocytosis, plt wnl, lymphopenia, INR 1.29, ddimer 927, elevated fibrinogen, cmp c/w ESRD, hyperglycemia 400s, AG 29, Lacate 4, vbg pH 7.39. . Procal ~7. CXR with b/l opacities. In ED pt received aspirin 81, heparin bolus and full dose gtt for presumed ACS/and elevated Ddimer, plaquenil for covid, IV ctx + po azithro, solu-medrol 40 40 x 1, midodrine, oxy IR, and tylenol prior to medicine team involvement.   Pt admitted w/ (+)COVID PNA, septic shock with hypoxic respiratory failure, acute STEMI suspected 2/2 possible in stent re-stenosis exacerbated by viral illness/tachycardia, and with hyperglycemia/elevated anion gap with compensated acidosis.   Pt started on supplemental oxygen titrated to pt's needs,  MDI Albuterol prn w/ Zofran, robitussin, tessalon, tylenol prn.   Pt is on HD and regular Dialysis routine was resummed.     Infectious disease was consulted.  Pt on Rocefin & steroids for symptomatic tx of sepsis.  Pt was not able to continue Plaquenil.     Midodrine 10 tid was continued.   Cardiology Consulted. VENUS inferior leads and reciprocal changes and initial  Pt w/o denies cp or palps.  Pt historically with severe/diffuse in-stent restenosis along RCA which was difficult to re-stent in the past 2/2 multiple prior stents/brachytherapy. Per cardiology fellow/team, the r/b ratio of emergent LHC at this time while patient is in acute septic shock/hypoxic respiratory failure with covid10 pna and prior difficult stenting is too high.  Cardiology team recommending telemonitoring w/ medical management in place of LHC and PCI.   Pt started on heparin gtt full dose in ER prior to medicine team involvement.  Will change dosing to ACS protocol, as the elevated ddimer is unlikely to represent true PE and rather is fairly typical of acute covid19 presentations.   Aspirin, plavix, continued w/ increase statin to atorva 80 (monitor LFT as pt has mild transaminitis likely viral/covid mediated), c/w bb    Cardiology felt possible inf stemi although not totally convinced given nonspecifc sx and hs trop at baseline - would expect greater degree of troponin elevation if truly acute inf stemi and patient without any ischemic sx at present. a/w holding off on [facility] for now given overall poor prognosis, unlikely to  at this time. as hep gtt can potentially lead to adverse outcomes in setting of novel coronavirus, after discussion with dr blevins will d/c hep gtt for now. cont to treat medically with anti-platelet agents and statin    Pt was given supportive care for covid-19. not a candidate for hydroxychloroquine due to prolonged qtc and ischemic heart disease and hx of ventricular arrhthymias    Cardiology said to resume gdmt for lv dysfunction as bp allows. currently on midodrine and toprol, c/w toprol for nsvt/lv dysfunction/cad    -known extensive CAD and ICM. s/p LHC 2/20/2019 --> severe and diffuse instent restenosis along RCA --> unable to stent due to multiple layers of stenting and prior brachytherapy. denies any true ischemic sx at present.  Pt noted w/ TTE 2019: mod-severe MR, pseudo AS (low flow-low gradient), mod-severe LV dysfunction --> plan is for medical management given surgical risk and patient preference.    Pt was followed by Endocrine as she is a fragile Type I DM pt and admitted w/  Type 1 diabetes mellitus with ketoacidosis without coma.  Her insulin regimen was adjusted as pt's appetite was not her usual.  Pt to follow up with her Endocrine doctor upon discharge.  Pt told to Take only 11U of Basaglar if her FS <200 and 12U if her FS is >200.      Check bs at 2am (she has hypoglycemia unawareness) and cover with low QHS scale    DISPO: basal/bolus: Basaglar 12 units sq qhs and Humalog: bs : 3 units, above 200: 4 units    -f/u with Dr. Ley, endocrine in [place].     Upon discharge home pt to continue with usual ESRD HD routine.  Case mngr had faxed over necessary paper work.  Cardiology said to c/w asa, plavix, statin for ischemic cardiomyopathy/CAD/PAD and midodrine and toprol for  nsvt/ lv dysfunction & CAD/ ICM and follow up with Cardiology MD.      As per protocol pt's with COVID are being discharged with anticoagulation prophylaxis for 1month.  Pt  is being told to continue BID PO x for at least 30days and follow up with PMD/ Cardiologist.    Pt tolerating diet with normal bowel & bladder function.  Pt afebrile, with vitals stable, sat'ing well on RA.

## 2020-04-12 NOTE — PROGRESS NOTE ADULT - SUBJECTIVE AND OBJECTIVE BOX
Chief Complaint/Follow-up on: T1DM and Hypoglycemia    Subjective:    MEDICATIONS  (STANDING):  aspirin enteric coated 81 milliGRAM(s) Oral daily  atorvastatin 80 milliGRAM(s) Oral at bedtime  cefTRIAXone   IVPB 1000 milliGRAM(s) IV Intermittent every 24 hours  clopidogrel Tablet 75 milliGRAM(s) Oral daily  dextrose 5%. 1000 milliLiter(s) (50 mL/Hr) IV Continuous <Continuous>  dextrose 50% Injectable 12.5 Gram(s) IV Push once  dextrose 50% Injectable 25 Gram(s) IV Push once  dextrose 50% Injectable 25 Gram(s) IV Push once  dextrose 50% Injectable 12.5 Gram(s) IV Push once  heparin  Injectable 5000 Unit(s) SubCutaneous every 12 hours  insulin glargine Injectable (LANTUS) 13 Unit(s) SubCutaneous at bedtime  insulin lispro (HumaLOG) corrective regimen sliding scale   SubCutaneous at bedtime  insulin lispro (HumaLOG) corrective regimen sliding scale   SubCutaneous <User Schedule>  insulin lispro (HumaLOG) corrective regimen sliding scale   SubCutaneous three times a day before meals  insulin lispro Injectable (HumaLOG) 3 Unit(s) SubCutaneous before lunch  insulin lispro Injectable (HumaLOG) 4 Unit(s) SubCutaneous before dinner  insulin lispro Injectable (HumaLOG) 3 Unit(s) SubCutaneous before breakfast  melatonin 3 milliGRAM(s) Oral once  metoprolol succinate ER 50 milliGRAM(s) Oral daily  midodrine. 10 milliGRAM(s) Oral three times a day  pantoprazole    Tablet 40 milliGRAM(s) Oral before breakfast  sevelamer carbonate 800 milliGRAM(s) Oral three times a day with meals  zinc sulfate 220 milliGRAM(s) Oral daily    MEDICATIONS  (PRN):  acetaminophen   Tablet .. 650 milliGRAM(s) Oral every 4 hours PRN Temp greater or equal to 38C (100.4F), Mild Pain (1 - 3), Moderate Pain (4 - 6), Severe Pain (7 - 10)  ALBUTerol    90 MICROgram(s) HFA Inhaler 2 Puff(s) Inhalation every 4 hours PRN Shortness of Breath and/or Wheezing  benzonatate 100 milliGRAM(s) Oral three times a day PRN Cough  dextrose 40% Gel 15 Gram(s) Oral once PRN Blood Glucose LESS THAN 70 milliGRAM(s)/deciliter  glucagon  Injectable 1 milliGRAM(s) IntraMuscular once PRN Glucose LESS THAN 70 milligrams/deciliter  glucagon  Injectable 1 milliGRAM(s) IntraMuscular once PRN Glucose LESS THAN 70 milligrams/deciliter  guaiFENesin   Syrup  (Sugar-Free) 200 milliGRAM(s) Oral every 6 hours PRN Cough  ondansetron Injectable 4 milliGRAM(s) IV Push every 12 hours PRN Nausea and/or Vomiting  oxycodone    5 mG/acetaminophen 325 mG 1 Tablet(s) Oral every 6 hours PRN Severe Pain (7 - 10)      PHYSICAL EXAM:  VITALS: T(C): 36.9 (04-12-20 @ 05:03)  T(F): 98.4 (04-12-20 @ 05:03), Max: 98.4 (04-12-20 @ 05:03)  HR: 78 (04-12-20 @ 05:03) (60 - 78)  BP: 124/53 (04-12-20 @ 05:03) (123/59 - 159/59)  RR:  (18 - 22)  SpO2:  (92% - 93%)  Wt(kg): 54.7 kg    Due to [facility] policy during the evolving novel coronavirus outbreak, efforts are being made to limit unnecessary patient contacts to limit the spread of disease. Accordingly, patients without clear indication for physical exam or face to face interview from the Endocrine Team will be adjusted in conjunction with conversations with primary provider team. This is being done for the safety of all patients we care for.    As per 04/11/20 progress note:  PHYSICAL EXAM:  GENERAL: NAD, well-developed  HEAD:  Atraumatic, Normocephalic  EYES: EOMI, PERRLA, conjunctiva and sclera clear  NECK: Supple, No JVD  CHEST/LUNG: few crackles to auscultation bilaterally; No wheeze  HEART: Regular rate and rhythm; No murmurs, rubs, or gallops  ABDOMEN: Soft, Nontender, Nondistended; Bowel sounds present  EXTREMITIES:  2+ Peripheral Pulses, No clubbing, cyanosis, or edema  PSYCH: AAOx3  NEUROLOGY: non-focal    POCT Blood Glucose.: 82 mg/dL (04-12-20 @ 08:22)  POCT Blood Glucose.: 68 mg/dL (04-12-20 @ 08:21)  POCT Blood Glucose.: 101 mg/dL (04-12-20 @ 02:28)    POCT Blood Glucose.: 110 mg/dL (04-11-20 @ 21:38) L13  POCT Blood Glucose.: 242 mg/dL (04-11-20 @ 17:01) Hum4+1  POCT Blood Glucose.: 299 mg/dL (04-11-20 @ 11:33) Hum 3+2  POCT Blood Glucose.: 288 mg/dL (04-11-20 @ 07:35) Hum5+2, Hum 1    POCT Blood Glucose.: 230 mg/dL (04-10-20 @ 21:34)  POCT Blood Glucose.: 245 mg/dL (04-10-20 @ 16:58)  POCT Blood Glucose.: 254 mg/dL (04-10-20 @ 11:56)  POCT Blood Glucose.: 227 mg/dL (04-10-20 @ 08:03)  POCT Blood Glucose.: 277 mg/dL (04-10-20 @ 01:58)    POCT Blood Glucose.: 311 mg/dL (04-09-20 @ 21:02)  POCT Blood Glucose.: 374 mg/dL (04-09-20 @ 17:05)  POCT Blood Glucose.: 289 mg/dL (04-09-20 @ 11:49)    04-12    137  |  93<L>  |  32<H>  ----------------------------<  74  4.5   |  26  |  3.90<H>    EGFR if : 13<L>  EGFR if non : 12<L>    Ca    8.7      04-12  Mg     2.2     04-12  Phos  3.1     04-12    TPro  6.3  /  Alb  3.2<L>  /  TBili  0.2  /  DBili  x   /  AST  57<H>  /  ALT  50<H>  /  AlkPhos  146<H>  04-12    Hemoglobin A1C, Whole Blood: 7.7 % <H> [4.0 - 5.6] (04-08-20 @ 08:03)  Hemoglobin A1C, Whole Blood: 8.2 % <H> [4.0 - 5.6] (01-18-20 @ 09:41) Chief Complaint/Follow-up on: T1DM and Hypoglycemia    Subjective: Spoke with patient, states feeling better and eating better. may go home later today. FS 62 this morning and states did not have any symptoms.  Home regimen as per patient  Basaglar was 10 units TID  Humalog 3-6 units TID    MEDICATIONS  (STANDING):  aspirin enteric coated 81 milliGRAM(s) Oral daily  atorvastatin 80 milliGRAM(s) Oral at bedtime  cefTRIAXone   IVPB 1000 milliGRAM(s) IV Intermittent every 24 hours  clopidogrel Tablet 75 milliGRAM(s) Oral daily  dextrose 5%. 1000 milliLiter(s) (50 mL/Hr) IV Continuous <Continuous>  dextrose 50% Injectable 12.5 Gram(s) IV Push once  dextrose 50% Injectable 25 Gram(s) IV Push once  dextrose 50% Injectable 25 Gram(s) IV Push once  dextrose 50% Injectable 12.5 Gram(s) IV Push once  heparin  Injectable 5000 Unit(s) SubCutaneous every 12 hours  insulin glargine Injectable (LANTUS) 13 Unit(s) SubCutaneous at bedtime  insulin lispro (HumaLOG) corrective regimen sliding scale   SubCutaneous at bedtime  insulin lispro (HumaLOG) corrective regimen sliding scale   SubCutaneous <User Schedule>  insulin lispro (HumaLOG) corrective regimen sliding scale   SubCutaneous three times a day before meals  insulin lispro Injectable (HumaLOG) 3 Unit(s) SubCutaneous before lunch  insulin lispro Injectable (HumaLOG) 4 Unit(s) SubCutaneous before dinner  insulin lispro Injectable (HumaLOG) 3 Unit(s) SubCutaneous before breakfast  melatonin 3 milliGRAM(s) Oral once  metoprolol succinate ER 50 milliGRAM(s) Oral daily  midodrine. 10 milliGRAM(s) Oral three times a day  pantoprazole    Tablet 40 milliGRAM(s) Oral before breakfast  sevelamer carbonate 800 milliGRAM(s) Oral three times a day with meals  zinc sulfate 220 milliGRAM(s) Oral daily    MEDICATIONS  (PRN):  acetaminophen   Tablet .. 650 milliGRAM(s) Oral every 4 hours PRN Temp greater or equal to 38C (100.4F), Mild Pain (1 - 3), Moderate Pain (4 - 6), Severe Pain (7 - 10)  ALBUTerol    90 MICROgram(s) HFA Inhaler 2 Puff(s) Inhalation every 4 hours PRN Shortness of Breath and/or Wheezing  benzonatate 100 milliGRAM(s) Oral three times a day PRN Cough  dextrose 40% Gel 15 Gram(s) Oral once PRN Blood Glucose LESS THAN 70 milliGRAM(s)/deciliter  glucagon  Injectable 1 milliGRAM(s) IntraMuscular once PRN Glucose LESS THAN 70 milligrams/deciliter  glucagon  Injectable 1 milliGRAM(s) IntraMuscular once PRN Glucose LESS THAN 70 milligrams/deciliter  guaiFENesin   Syrup  (Sugar-Free) 200 milliGRAM(s) Oral every 6 hours PRN Cough  ondansetron Injectable 4 milliGRAM(s) IV Push every 12 hours PRN Nausea and/or Vomiting  oxycodone    5 mG/acetaminophen 325 mG 1 Tablet(s) Oral every 6 hours PRN Severe Pain (7 - 10)      PHYSICAL EXAM:  VITALS: T(C): 36.9 (04-12-20 @ 05:03)  T(F): 98.4 (04-12-20 @ 05:03), Max: 98.4 (04-12-20 @ 05:03)  HR: 78 (04-12-20 @ 05:03) (60 - 78)  BP: 124/53 (04-12-20 @ 05:03) (123/59 - 159/59)  RR:  (18 - 22)  SpO2:  (92% - 93%)  Wt(kg): 54.7 kg    Due to [facility] policy during the evolving novel coronavirus outbreak, efforts are being made to limit unnecessary patient contacts to limit the spread of disease. Accordingly, patients without clear indication for physical exam or face to face interview from the Endocrine Team will be adjusted in conjunction with conversations with primary provider team. This is being done for the safety of all patients we care for.    As per 04/11/20 progress note:  PHYSICAL EXAM:  GENERAL: NAD, well-developed  HEAD:  Atraumatic, Normocephalic  EYES: EOMI, PERRLA, conjunctiva and sclera clear  NECK: Supple, No JVD  CHEST/LUNG: few crackles to auscultation bilaterally; No wheeze  HEART: Regular rate and rhythm; No murmurs, rubs, or gallops  ABDOMEN: Soft, Nontender, Nondistended; Bowel sounds present  EXTREMITIES:  2+ Peripheral Pulses, No clubbing, cyanosis, or edema  PSYCH: AAOx3  NEUROLOGY: non-focal    POCT Blood Glucose.: 82 mg/dL (04-12-20 @ 08:22)  POCT Blood Glucose.: 68 mg/dL (04-12-20 @ 08:21)  POCT Blood Glucose.: 101 mg/dL (04-12-20 @ 02:28)    POCT Blood Glucose.: 110 mg/dL (04-11-20 @ 21:38) L13  POCT Blood Glucose.: 242 mg/dL (04-11-20 @ 17:01) Hum4+1  POCT Blood Glucose.: 299 mg/dL (04-11-20 @ 11:33) Hum 3+2  POCT Blood Glucose.: 288 mg/dL (04-11-20 @ 07:35) Hum5+2, Hum 1    POCT Blood Glucose.: 230 mg/dL (04-10-20 @ 21:34)  POCT Blood Glucose.: 245 mg/dL (04-10-20 @ 16:58)  POCT Blood Glucose.: 254 mg/dL (04-10-20 @ 11:56)  POCT Blood Glucose.: 227 mg/dL (04-10-20 @ 08:03)  POCT Blood Glucose.: 277 mg/dL (04-10-20 @ 01:58)    POCT Blood Glucose.: 311 mg/dL (04-09-20 @ 21:02)  POCT Blood Glucose.: 374 mg/dL (04-09-20 @ 17:05)  POCT Blood Glucose.: 289 mg/dL (04-09-20 @ 11:49)    04-12    137  |  93<L>  |  32<H>  ----------------------------<  74  4.5   |  26  |  3.90<H>    EGFR if : 13<L>  EGFR if non : 12<L>    Ca    8.7      04-12  Mg     2.2     04-12  Phos  3.1     04-12    TPro  6.3  /  Alb  3.2<L>  /  TBili  0.2  /  DBili  x   /  AST  57<H>  /  ALT  50<H>  /  AlkPhos  146<H>  04-12    Hemoglobin A1C, Whole Blood: 7.7 % <H> [4.0 - 5.6] (04-08-20 @ 08:03)  Hemoglobin A1C, Whole Blood: 8.2 % <H> [4.0 - 5.6] (01-18-20 @ 09:41)

## 2020-04-12 NOTE — PROGRESS NOTE ADULT - NS ED BHA TELEPSYCH PATIENT LOCATION
[facility]

## 2020-04-12 NOTE — PROGRESS NOTE ADULT - ASSESSMENT
63 y/o female with poorly controlled brittle T1DM (A1c 7.7%) complicated with ESRD on HD, CAD, TIA, PAD here with severe COVID-19 infection, lactic acidosis, high anion gap metabolic acidosis, and severe hyperglycemia in the setting of steroids. Now with hypoglycemia. Patient also with COPD, HTN, HLD.

## 2020-04-12 NOTE — DISCHARGE NOTE PROVIDER - NSDCFUADDAPPT_GEN_ALL_CORE_FT
Discussed with Pt and  to call in am for follow up with her  Primary care doctor and Endocrine and Cardiology doctors  follow up as outpatient with Dr Ley Endocrine & Dr Stratton    Dialysis was arranged for in her usual routine at her Dialysis center

## 2020-04-12 NOTE — DISCHARGE NOTE PROVIDER - NSDCCPCAREPLAN_GEN_ALL_CORE_FT
PRINCIPAL DISCHARGE DIAGNOSIS  Diagnosis: COVID-19  Assessment and Plan of Treatment:       SECONDARY DISCHARGE DIAGNOSES  Diagnosis: PNA (pneumonia)  Assessment and Plan of Treatment:     Diagnosis: NSTEMI (non-ST elevated myocardial infarction)  Assessment and Plan of Treatment:

## 2020-04-12 NOTE — PROGRESS NOTE ADULT - SUBJECTIVE AND OBJECTIVE BOX
[facility]   [phone]  RENAL FOLLOW UP NOTE  --------------------------------------------------------------------------------  Chief Complaint:    24 hour events/subjective:    states breathing is improving     PAST HISTORY  --------------------------------------------------------------------------------  No significant changes to PMH, PSH, FHx, SHx, unless otherwise noted    ALLERGIES & MEDICATIONS  --------------------------------------------------------------------------------  Allergies    No Known Allergies    Intolerances      Standing Inpatient Medications  apixaban 2.5 milliGRAM(s) Oral once  aspirin enteric coated 81 milliGRAM(s) Oral daily  atorvastatin 80 milliGRAM(s) Oral at bedtime  clopidogrel Tablet 75 milliGRAM(s) Oral daily  dextrose 5%. 1000 milliLiter(s) IV Continuous <Continuous>  dextrose 50% Injectable 12.5 Gram(s) IV Push once  dextrose 50% Injectable 25 Gram(s) IV Push once  dextrose 50% Injectable 25 Gram(s) IV Push once  dextrose 50% Injectable 12.5 Gram(s) IV Push once  heparin  Injectable 5000 Unit(s) SubCutaneous every 12 hours  insulin glargine Injectable (LANTUS) 12 Unit(s) SubCutaneous at bedtime  insulin lispro (HumaLOG) corrective regimen sliding scale   SubCutaneous three times a day before meals  insulin lispro (HumaLOG) corrective regimen sliding scale   SubCutaneous at bedtime  insulin lispro (HumaLOG) corrective regimen sliding scale   SubCutaneous <User Schedule>  insulin lispro Injectable (HumaLOG) 3 Unit(s) SubCutaneous before lunch  insulin lispro Injectable (HumaLOG) 4 Unit(s) SubCutaneous before dinner  insulin lispro Injectable (HumaLOG) 3 Unit(s) SubCutaneous before breakfast  melatonin 3 milliGRAM(s) Oral once  metoprolol succinate ER 50 milliGRAM(s) Oral daily  midodrine. 10 milliGRAM(s) Oral three times a day  pantoprazole    Tablet 40 milliGRAM(s) Oral before breakfast  sevelamer carbonate 800 milliGRAM(s) Oral three times a day with meals  zinc sulfate 220 milliGRAM(s) Oral daily    PRN Inpatient Medications  acetaminophen   Tablet .. 650 milliGRAM(s) Oral every 4 hours PRN  ALBUTerol    90 MICROgram(s) HFA Inhaler 2 Puff(s) Inhalation every 4 hours PRN  benzonatate 100 milliGRAM(s) Oral three times a day PRN  dextrose 40% Gel 15 Gram(s) Oral once PRN  glucagon  Injectable 1 milliGRAM(s) IntraMuscular once PRN  glucagon  Injectable 1 milliGRAM(s) IntraMuscular once PRN  guaiFENesin   Syrup  (Sugar-Free) 200 milliGRAM(s) Oral every 6 hours PRN  ondansetron Injectable 4 milliGRAM(s) IV Push every 12 hours PRN  oxycodone    5 mG/acetaminophen 325 mG 1 Tablet(s) Oral every 6 hours PRN      REVIEW OF SYSTEMS  --------------------------------------------------------------------------------    Gen: denies fevers/chills,  CVS: denies chest pain/palpitations  Resp: denies SOB/Cough  GI: Denies N/V/Abd pain  : Denies dysuria    All other systems were reviewed and are negative, except as noted.    VITALS/PHYSICAL EXAM  --------------------------------------------------------------------------------  T(C): 36.6 (04-12-20 @ 12:25), Max: 36.9 (04-12-20 @ 05:03)  HR: 73 (04-12-20 @ 12:25) (64 - 78)  BP: 135/74 (04-12-20 @ 12:25) (123/59 - 135/74)  RR: 20 (04-12-20 @ 12:25) (18 - 20)  SpO2: 93% (04-12-20 @ 12:25) (92% - 93%)  Wt(kg): --        04-11-20 @ 07:01  -  04-12-20 @ 07:00  --------------------------------------------------------  IN: 960 mL / OUT: 1500 mL / NET: -540 mL      Physical Exam:  	    due to covid 19 isolation exam reliability per primary team   	    LABS/STUDIES  --------------------------------------------------------------------------------              10.3   9.88  >-----------<  341      [04-12-20 @ 07:14]              33.4     137  |  93  |  32  ----------------------------<  74      [04-12-20 @ 07:14]  4.5   |  26  |  3.90        Ca     8.7     [04-12-20 @ 07:14]      Mg     2.2     [04-12-20 @ 07:14]      Phos  3.1     [04-12-20 @ 07:14]    TPro  6.3  /  Alb  3.2  /  TBili  0.2  /  DBili  x   /  AST  57  /  ALT  50  /  AlkPhos  146  [04-12-20 @ 07:14]          Creatinine Trend:  SCr 3.90 [04-12 @ 07:14]  SCr 5.58 [04-11 @ 06:17]  SCr 4.49 [04-10 @ 06:38]  SCr 3.23 [04-09 @ 06:52]  SCr 4.35 [04-08 @ 07:02]                  Iron 67, TIBC 234, %sat 29      [12-04-19 @ 08:38]  Ferritin 5140      [04-11-20 @ 10:01]  PTH -- (Ca 8.3)      [12-04-19 @ 08:38]   952  PTH -- (Ca 9.6)      [06-17-19 @ 18:06]   177  HbA1c 7.7      [04-08-20 @ 08:03]  TSH 1.78      [11-14-19 @ 09:15]  Lipid: chol 108, TG 76, HDL 57, LDL 36      [11-14-19 @ 09:16]

## 2020-04-12 NOTE — PROGRESS NOTE ADULT - SUBJECTIVE AND OBJECTIVE BOX
CC: f/u for covid, sepsis    Patient reports  feels great , anxious to go home  REVIEW OF SYSTEMS:  All other review of systems negative (Comprehensive ROS)    Antimicrobials Day #  :6  cefTRIAXone   IVPB 1000 milliGRAM(s) IV Intermittent every 24 hours    Other Medications Reviewed    T(F): 97.8 (04-12-20 @ 12:25), Max: 98.4 (04-12-20 @ 05:03)  HR: 73 (04-12-20 @ 12:25)  BP: 135/74 (04-12-20 @ 12:25)  RR: 20 (04-12-20 @ 12:25)  SpO2: 93% (04-12-20 @ 12:25)  Wt(kg): --    PHYSICAL EXAM:  General: alert, no acute distress  Eyes:  anicteric, no conjunctival injection, no discharge  Oropharynx: no lesions or injection 	  Neck: supple, without adenopathy  Lungs: clear to auscultation  Heart: regular rate and rhythm; no murmur, rubs or gallops  Abdomen: soft, nondistended, nontender, without mass or organomegaly  Skin: no lesions  Extremities: no clubbing, cyanosis, or edema  Neurologic: alert, oriented, moves all extremities    LAB RESULTS:                        10.3   9.88  )-----------( 341      ( 12 Apr 2020 07:14 )             33.4     04-12    137  |  93<L>  |  32<H>  ----------------------------<  74  4.5   |  26  |  3.90<H>    Ca    8.7      12 Apr 2020 07:14  Phos  3.1     04-12  Mg     2.2     04-12    TPro  6.3  /  Alb  3.2<L>  /  TBili  0.2  /  DBili  x   /  AST  57<H>  /  ALT  50<H>  /  AlkPhos  146<H>  04-12    LIVER FUNCTIONS - ( 12 Apr 2020 07:14 )  Alb: 3.2 g/dL / Pro: 6.3 g/dL / ALK PHOS: 146 U/L / ALT: 50 U/L / AST: 57 U/L / GGT: x             MICROBIOLOGY:  RECENT CULTURES:      RADIOLOGY REVIEWED:  < from: [facility] Duplex Lower Ext Vein Scan, Left (04.08.20 @ 19:35) >    IMPRESSION:     No evidence of left lower extremity deep venous thrombosis.      < end of copied text >              Assessment:  Assessment:  Patient with esrd on dialysis,  admitted with fever, lethargy, positive for covid. Has long qtc so no plaquenil. She is now oxygenating on RA over 90%. She is finished a course of ctx for concern of superimposed severe sepsis given her unstable presentation  Plan:  stop ctx  monitor oxygenation  d/c planning

## 2020-04-12 NOTE — DISCHARGE NOTE PROVIDER - NSDCMRMEDTOKEN_GEN_ALL_CORE_FT
acetaminophen 325 mg oral tablet: 2 tab(s) orally every 6 hours, As Needed  albuterol 90 mcg/inh inhalation aerosol: 2 puff(s) inhaled every 4 hours, As needed, Shortness of Breath and/or Wheezing  apixaban 5 mg oral tablet: 1 tab(s) orally 2 times a day  aspirin 81 mg oral delayed release tablet: 1 tab(s) orally once a day  atorvastatin 20 mg oral tablet: 1 tab(s) orally once a day  Basaglar KwikPen 100 units/mL subcutaneous solution: 13 unit(s) subcutaneous once a day (at bedtime)  budesonide 0.5 mg/2 mL inhalation suspension: 2 milliliter(s) inhaled 2 times a day  clopidogrel 75 mg oral tablet: 1 tab(s) orally once a day  NovoLOG FlexTouch 100 units/mL subcutaneous solution: 3-4 units based upon PO intake and fingersticks  pantoprazole 40 mg oral delayed release tablet: 1 tab(s) orally once a day  sevelamer carbonate 800 mg oral tablet: 1 tab(s) orally 3 times a day (with meals)  Toprol-XL 50 mg oral tablet, extended release: 1 tab(s) orally once a day (at bedtime) acetaminophen 325 mg oral tablet: 2 tab(s) orally every 6 hours, As Needed  albuterol 90 mcg/inh inhalation aerosol: 2 puff(s) inhaled every 4 hours, As needed, Shortness of Breath and/or Wheezing  apixaban 2.5 mg oral tablet: 1 tab(s) orally every 12 hours   aspirin 81 mg oral delayed release tablet: 1 tab(s) orally once a day  atorvastatin 80 mg oral tablet: 1 tab(s) orally once a day (at bedtime)  Basaglar KwikPen 100 units/mL subcutaneous solution: 12 unit(s) subcutaneous once a day (at bedtime)  benzonatate 100 mg oral capsule: 1 cap(s) orally 3 times a day, As needed, Cough  budesonide 0.5 mg/2 mL inhalation suspension: 2 milliliter(s) inhaled 2 times a day  clopidogrel 75 mg oral tablet: 1 tab(s) orally once a day  midodrine 10 mg oral tablet: 1 tab(s) orally 3 times a day  NovoLOG FlexPen 100 units/mL injectable solution: unit(s) injectable 3 times a day  1 Unit(s) if Glucose 151 - 200  2 Unit(s) if Glucose 201 - 250  3 Unit(s) if Glucose 251 - 300  4 Unit(s) if Glucose 301 - 350  5 Unit(s) if Glucose 351 - 400  6 Unit(s) if Glucose Greater Than 400  NovoLOG FlexPen 100 units/mL injectable solution: 3 unit(s) injectable 2 times a day (before meals) breakfast and lunch   NovoLOG FlexPen 100 units/mL injectable solution: 4 unit(s) injectable once a day at Dinner time   NovoLOG FlexTouch 100 units/mL subcutaneous solution: 3-4 units based upon PO intake and fingersticks  pantoprazole 40 mg oral delayed release tablet: 1 tab(s) orally once a day  sevelamer carbonate 800 mg oral tablet: 1 tab(s) orally 3 times a day (with meals)  Toprol-XL 50 mg oral tablet, extended release: 1 tab(s) orally once a day (at bedtime)

## 2020-04-12 NOTE — PROGRESS NOTE ADULT - ASSESSMENT
64 F PMH CAD Sp PTCA w stents CHF,COPD, CVA, DMT1, ESRD on HD (M,Tu,Th,Sa), Gout, HLD, PVD, Sublcavian Stensosis (L) sp stent. PSH ASD Repair,idania,fem-pop bypass, recurrent admission for hyper/hypoglycemia/DKA, p/w fever, cough and encephalopathy, found to be in septic shock with hypoxic respiratory failure, acute STEMI suspected 2/2 possible in stent re-stenosis exacerbated by viral illness/tachycardia, and with hyperglycemia/elevated anion gap with compensated acidosis.     Pneumonia due to 2019 novel coronavirus.   - fever, cough, sob, encephalopathy; presents with septic shock and hypoxia, found with lymphopenia, elevated lactate/procal/ldh/ddimer, and cxr showing b/l opacities, COVID19 PCR  positive.   -airborne + contact isolation  -Supplemental oxygen as required.   -ID follow   -c/w plaquenil qtc noted 498  -patient with concurrent copd; s/p solumedrol   -MDI albuterol prn  -given elevated lactate and procal and multiple comorbidities + septic shock, seems reasonable to continue with empiric coverage of bacterial superinfection.  c/w ceftriaxone for now, hold azithro for elevated qtc  -zofran, robitussin, tessalon, tylenol prn.     Septic shock.  - fever, tachycardia, tachypnea, hypotension, elevated lactate. source is covid19 pna  -c/w mgt as noted above.   -c/w midodrine 10 tid  -patient confirmed DNR/DNI; no clear benefit for pressor therapy in setting of active STEMI that is not being intervened on, septic shock, covid19 pna in patient who is former smoker and with copd, with other significant mobidities such as ESRD on dialysis.     Encephalopathic from acute illness, resolved     Acute respiratory failure with hypoxia.  - 2/2 covid 19 pna  - Supplemental oxygen as required. Can escalate to NRB if required, would avoid bilevel support at this time.     ST elevation myocardial infarction (STEMI), unspecified artery.   - patient presenting with VENUS inferior leads and reciprocal changes and initial . denies cp or palps.   -cardiology follow.    -pt started on heparin gtt full dose in ER.    -c/w aspirin, plavix, increase statin to atorva 80 (monitor LFT as pt has mild transaminitis likely viral/covid mediated), c/w bb  -f/u further cards recs    ESRD on hemodialysis.   -c/w sevelemer  -c/w midodrine  -renal follow.     Type 2 diabetes mellitus with hyperglycemia, with long-term current use of insulin.  -pt with fsg in 400s, and now on short course steroids for covid19/copd  -Lantus 12/humalog 3 premeal  -endo follow  -currently pt has compensated multifactorial met.acidosis; bicarb >18 and pH nearly normal.  AG Is noted 29 but with elevated lactate is likely multifactorial.     Advanced care planning/counseling discussion.  - pt is CONFIRMED TO BE DNR/ DNI.   - trial of IV fluids/abx acceptable.      Prophylactic measure.   - Heparin    DCP home.    Pierce Viera MD pager [phone] 64 F PMH CAD Sp PTCA w stents CHF,COPD, CVA, DMT1, ESRD on HD (M,Tu,Th,Sa), Gout, HLD, PVD, Sublcavian Stensosis (L) sp stent. PSH ASD Repair,idania,fem-pop bypass, recurrent admission for hyper/hypoglycemia/DKA, p/w fever, cough and encephalopathy, found to be in septic shock with hypoxic respiratory failure, acute STEMI suspected 2/2 possible in stent re-stenosis exacerbated by viral illness/tachycardia, and with hyperglycemia/elevated anion gap with compensated acidosis.     Pneumonia due to 2019 novel coronavirus.   - fever, cough, sob, encephalopathy; presents with septic shock and hypoxia, found with lymphopenia, elevated lactate/procal/ldh/ddimer, and cxr showing b/l opacities, COVID19 PCR  positive.   -airborne + contact isolation  -Supplemental oxygen as required.   -ID follow   -c/w plaquenil qtc noted 498  -patient with concurrent copd; s/p solumedrol   -MDI albuterol prn  -given elevated lactate and procal and multiple comorbidities + septic shock, seems reasonable to continue with empiric coverage of bacterial superinfection.  c/w ceftriaxone for now, hold azithro for elevated qtc  -zofran, robitussin, tessalon, tylenol prn.     Septic shock.  - fever, tachycardia, tachypnea, hypotension, elevated lactate. source is covid19 pna  -c/w mgt as noted above.   -c/w midodrine 10 tid  -patient confirmed DNR/DNI; no clear benefit for pressor therapy in setting of active STEMI that is not being intervened on, septic shock, covid19 pna in patient who is former smoker and with copd, with other significant mobidities such as ESRD on dialysis.     Encephalopathic from acute illness, resolved     Acute respiratory failure with hypoxia.  - 2/2 covid 19 pna  - Supplemental oxygen as required. Can escalate to NRB if required, would avoid bilevel support at this time.     ST elevation myocardial infarction (STEMI), unspecified artery.   - patient presenting with VENUS inferior leads and reciprocal changes and initial . denies cp or palps.   -cardiology follow.    -pt started on heparin gtt full dose in ER.    -c/w aspirin, plavix, increase statin to atorva 80 (monitor LFT as pt has mild transaminitis likely viral/covid mediated), c/w bb  -f/u further cards recs    ESRD on hemodialysis.   -c/w sevelemer  -c/w midodrine  -renal follow.     Type 2 diabetes mellitus with hyperglycemia, with long-term current use of insulin.  -pt with fsg in 400s, and now on short course steroids for covid19/copd  -Lantus 12/humalog 3 premeal  -endo follow  -currently pt has compensated multifactorial met.acidosis; bicarb >18 and pH nearly normal.  AG Is noted 29 but with elevated lactate is likely multifactorial.     Advanced care planning/counseling discussion.  - pt is CONFIRMED TO BE DNR/ DNI.   - trial of IV fluids/abx acceptable.      Prophylactic measure.   - Heparin  - Xarelto 10 mg daily for 30 days at home    DC home. Follow with PMD/ Nephrology/ Endocrine. Covid instructions.    Pierce Viera MD pager [phone] 64 F PMH CAD Sp PTCA w stents CHF,COPD, CVA, DMT1, ESRD on HD (M,Tu,Th,Sa), Gout, HLD, PVD, Sublcavian Stensosis (L) sp stent. PSH ASD Repair,idania,fem-pop bypass, recurrent admission for hyper/hypoglycemia/DKA, p/w fever, cough and encephalopathy, found to be in septic shock with hypoxic respiratory failure, acute STEMI suspected 2/2 possible in stent re-stenosis exacerbated by viral illness/tachycardia, and with hyperglycemia/elevated anion gap with compensated acidosis.     Pneumonia due to 2019 novel coronavirus.   - fever, cough, sob, encephalopathy; presents with septic shock and hypoxia, found with lymphopenia, elevated lactate/procal/ldh/ddimer, and cxr showing b/l opacities, COVID19 PCR  positive.   -airborne + contact isolation  -Supplemental oxygen as required.   -ID follow   -c/w plaquenil qtc noted 498  -patient with concurrent copd; s/p solumedrol   -MDI albuterol prn  -given elevated lactate and procal and multiple comorbidities + septic shock, seems reasonable to continue with empiric coverage of bacterial superinfection.  c/w ceftriaxone for now, hold azithro for elevated qtc  -zofran, robitussin, tessalon, tylenol prn.     Septic shock.  - fever, tachycardia, tachypnea, hypotension, elevated lactate. source is covid19 pna  -c/w mgt as noted above.   -c/w midodrine 10 tid  -patient confirmed DNR/DNI; no clear benefit for pressor therapy in setting of active STEMI that is not being intervened on, septic shock, covid19 pna in patient who is former smoker and with copd, with other significant mobidities such as ESRD on dialysis.     Encephalopathic from acute illness, resolved     Acute respiratory failure with hypoxia.  - 2/2 covid 19 pna  - Supplemental oxygen as required. Can escalate to NRB if required, would avoid bilevel support at this time.     ST elevation myocardial infarction (STEMI), unspecified artery.   - patient presenting with VENUS inferior leads and reciprocal changes and initial . denies cp or palps.   -cardiology follow.    -pt started on heparin gtt full dose in ER.    -c/w aspirin, plavix, increase statin to atorva 80 (monitor LFT as pt has mild transaminitis likely viral/covid mediated), c/w bb  -f/u further cards recs    ESRD on hemodialysis.   -c/w sevelemer  -c/w midodrine  -renal follow.     Type 2 diabetes mellitus with hyperglycemia, with long-term current use of insulin.  -pt with fsg in 400s, and now on short course steroids for covid19/copd  -Lantus 12/humalog 3 premeal  -endo follow  -currently pt has compensated multifactorial met.acidosis; bicarb >18 and pH nearly normal.  AG Is noted 29 but with elevated lactate is likely multifactorial.     Advanced care planning/counseling discussion.  - pt is CONFIRMED TO BE DNR/ DNI.   - trial of IV fluids/abx acceptable.      Prophylactic measure.   - Heparin  - Xarelto vs Eliquis daily for 30 days at home    DC home. Follow with PMD/ Nephrology/ Endocrine. Covid instructions.    Pierce Viera MD pager [phone]

## 2020-04-12 NOTE — PROGRESS NOTE ADULT - ASSESSMENT
64 F PMH CAD Sp PTCA w stents CHF,COPD, CVA, DMT1, ESRD on HD (M,Tu,Th,Sa), Gout, HLD, PVD, Sublcavian Stensosis (L) sp stent. PSH ASD Repair,idania,fem-pop bypass, recurrent admission for hyper/hypoglycemia/DKA, p/w fever, cough       1- ESRD  2- COVID 19 +   3- viral pneumonia   4- hypotension     hd 3 hr f 180 2 k bath 2 liter  bfr 400 dfr 800   epogen 24074 U   cont midodrine 10 mg tid   plaquenil 200 mg bid complete   lovenox 40 mg sq  insulin for hyperglycemia   O2 supplement   shpt renvela one tab with meals

## 2020-04-12 NOTE — DISCHARGE NOTE PROVIDER - CARE PROVIDER_API CALL
Julián Biswas)  Internal Medicine  [address] [address] [place] [address]  Phone: [phone]  Fax: [phone]  Follow Up Time:     Pina Ley)  EndocrinologyMetabDiabetes  [address] [place] [address]  Phone: [phone]  Fax: [phone]  Follow Up Time:

## 2020-04-12 NOTE — PROGRESS NOTE ADULT - PROBLEM SELECTOR PLAN 2
-at goal, less than 130/80 with midodrine to keep her bp up

## 2020-04-12 NOTE — PROGRESS NOTE ADULT - PROBLEM SELECTOR PLAN 1
-Adjust insulin as thus:  -Continue Humalog 3/3/4 tid ac  -Decrease Lantus to 12 units sq qhs and small correctional scale tid-ac/qhs  Check bs at 2am (she has hypoglycemia unawareness) and cover with low QHS scale  DISPO: basal/bolus: Basaglar 12 units sq qhs and Humalog: bs : 3 units, above 200: 4 units  -f/u with Dr. Ley, endocrine in [place]

## 2020-04-12 NOTE — DISCHARGE NOTE PROVIDER - NSDCFUADDINST_GEN_ALL_CORE_FT
Start Eliquis 2.5mg BID PO x30days    Basalgar Insulin - 11U if Finger Stick is less than 200 and 12U if Finger Stick is greater than 200  and   Humalog with meals when blood sugar : 3 units, above 200: 4 units Start Eliquis 2.5mg BID PO x30days    Basalgar Insulin at night - 11U if Finger Stick is less than 200 and 12U if Finger Stick is greater than 200  and   Humalog with meals when blood sugar : 3 units, above 200: 4 units

## 2020-04-12 NOTE — PROGRESS NOTE ADULT - REASON FOR ADMISSION
fever, cough, encephalopathy.

## 2020-04-12 NOTE — PROGRESS NOTE ADULT - SUBJECTIVE AND OBJECTIVE BOX
Patient is a 64y old  Female who presents with a chief complaint of fever, cough, encephalopathy. (12 Apr 2020 10:48)    SUBJECTIVE / OVERNIGHT EVENTS: Comfortable without new complaints. Improving SOB.  Review of Systems  chest pain no  palpitations no  nausea no  headache no    MEDICATIONS  (STANDING):  aspirin enteric coated 81 milliGRAM(s) Oral daily  atorvastatin 80 milliGRAM(s) Oral at bedtime  cefTRIAXone   IVPB 1000 milliGRAM(s) IV Intermittent every 24 hours  clopidogrel Tablet 75 milliGRAM(s) Oral daily  dextrose 5%. 1000 milliLiter(s) (50 mL/Hr) IV Continuous <Continuous>  dextrose 50% Injectable 12.5 Gram(s) IV Push once  dextrose 50% Injectable 25 Gram(s) IV Push once  dextrose 50% Injectable 25 Gram(s) IV Push once  dextrose 50% Injectable 12.5 Gram(s) IV Push once  heparin  Injectable 5000 Unit(s) SubCutaneous every 12 hours  insulin glargine Injectable (LANTUS) 12 Unit(s) SubCutaneous at bedtime  insulin lispro (HumaLOG) corrective regimen sliding scale   SubCutaneous three times a day before meals  insulin lispro (HumaLOG) corrective regimen sliding scale   SubCutaneous at bedtime  insulin lispro (HumaLOG) corrective regimen sliding scale   SubCutaneous <User Schedule>  insulin lispro Injectable (HumaLOG) 3 Unit(s) SubCutaneous before lunch  insulin lispro Injectable (HumaLOG) 4 Unit(s) SubCutaneous before dinner  insulin lispro Injectable (HumaLOG) 3 Unit(s) SubCutaneous before breakfast  melatonin 3 milliGRAM(s) Oral once  metoprolol succinate ER 50 milliGRAM(s) Oral daily  midodrine. 10 milliGRAM(s) Oral three times a day  pantoprazole    Tablet 40 milliGRAM(s) Oral before breakfast  sevelamer carbonate 800 milliGRAM(s) Oral three times a day with meals  zinc sulfate 220 milliGRAM(s) Oral daily    MEDICATIONS  (PRN):  acetaminophen   Tablet .. 650 milliGRAM(s) Oral every 4 hours PRN Temp greater or equal to 38C (100.4F), Mild Pain (1 - 3), Moderate Pain (4 - 6), Severe Pain (7 - 10)  ALBUTerol    90 MICROgram(s) HFA Inhaler 2 Puff(s) Inhalation every 4 hours PRN Shortness of Breath and/or Wheezing  benzonatate 100 milliGRAM(s) Oral three times a day PRN Cough  dextrose 40% Gel 15 Gram(s) Oral once PRN Blood Glucose LESS THAN 70 milliGRAM(s)/deciliter  glucagon  Injectable 1 milliGRAM(s) IntraMuscular once PRN Glucose LESS THAN 70 milligrams/deciliter  glucagon  Injectable 1 milliGRAM(s) IntraMuscular once PRN Glucose LESS THAN 70 milligrams/deciliter  guaiFENesin   Syrup  (Sugar-Free) 200 milliGRAM(s) Oral every 6 hours PRN Cough  ondansetron Injectable 4 milliGRAM(s) IV Push every 12 hours PRN Nausea and/or Vomiting  oxycodone    5 mG/acetaminophen 325 mG 1 Tablet(s) Oral every 6 hours PRN Severe Pain (7 - 10)      Vital Signs Last 24 Hrs  T(C): 36.6 (12 Apr 2020 12:25), Max: 36.9 (12 Apr 2020 05:03)  T(F): 97.8 (12 Apr 2020 12:25), Max: 98.4 (12 Apr 2020 05:03)  HR: 73 (12 Apr 2020 12:25) (60 - 78)  BP: 135/74 (12 Apr 2020 12:25) (123/59 - 159/59)  BP(mean): --  RR: 20 (12 Apr 2020 12:25) (18 - 20)  SpO2: 93% (12 Apr 2020 12:25) (92% - 93%)    PHYSICAL EXAM:  GENERAL: NAD, well-developed  HEAD:  Atraumatic, Normocephalic  EYES: EOMI, PERRLA, conjunctiva and sclera clear  NECK: Supple, No JVD  CHEST/LUNG: Clear to auscultation bilaterally; No wheeze  HEART: regular rate and rhythm; No murmurs, rubs, or gallops  ABDOMEN: Soft, Nontender, Nondistended; Bowel sounds present  EXTREMITIES:  2+ Peripheral Pulses, No clubbing, cyanosis, or edema  PSYCH: AAOx3  NEUROLOGY: non-focal  SKIN: No rashes or lesions    LABS:                        10.3   9.88  )-----------( 341      ( 12 Apr 2020 07:14 )             33.4     04-12    137  |  93<L>  |  32<H>  ----------------------------<  74  4.5   |  26  |  3.90<H>    Ca    8.7      12 Apr 2020 07:14  Phos  3.1     04-12  Mg     2.2     04-12    TPro  6.3  /  Alb  3.2<L>  /  TBili  0.2  /  DBili  x   /  AST  57<H>  /  ALT  50<H>  /  AlkPhos  146<H>  04-12                RADIOLOGY & ADDITIONAL TESTS:    Imaging Personally Reviewed:    Consultant(s) Notes Reviewed:      Care Discussed with Consultants/Other Providers:  Patient is a 64y old  Female who presents with a chief complaint of fever, cough, encephalopathy. (12 Apr 2020 10:48)      SUBJECTIVE / OVERNIGHT EVENTS:  Review of Systems  chest pain no  palpitations no  sob no  nausea no  headache no    MEDICATIONS  (STANDING):  aspirin enteric coated 81 milliGRAM(s) Oral daily  atorvastatin 80 milliGRAM(s) Oral at bedtime  cefTRIAXone   IVPB 1000 milliGRAM(s) IV Intermittent every 24 hours  clopidogrel Tablet 75 milliGRAM(s) Oral daily  dextrose 5%. 1000 milliLiter(s) (50 mL/Hr) IV Continuous <Continuous>  dextrose 50% Injectable 12.5 Gram(s) IV Push once  dextrose 50% Injectable 25 Gram(s) IV Push once  dextrose 50% Injectable 25 Gram(s) IV Push once  dextrose 50% Injectable 12.5 Gram(s) IV Push once  heparin  Injectable 5000 Unit(s) SubCutaneous every 12 hours  insulin glargine Injectable (LANTUS) 12 Unit(s) SubCutaneous at bedtime  insulin lispro (HumaLOG) corrective regimen sliding scale   SubCutaneous three times a day before meals  insulin lispro (HumaLOG) corrective regimen sliding scale   SubCutaneous at bedtime  insulin lispro (HumaLOG) corrective regimen sliding scale   SubCutaneous <User Schedule>  insulin lispro Injectable (HumaLOG) 3 Unit(s) SubCutaneous before lunch  insulin lispro Injectable (HumaLOG) 4 Unit(s) SubCutaneous before dinner  insulin lispro Injectable (HumaLOG) 3 Unit(s) SubCutaneous before breakfast  melatonin 3 milliGRAM(s) Oral once  metoprolol succinate ER 50 milliGRAM(s) Oral daily  midodrine. 10 milliGRAM(s) Oral three times a day  pantoprazole    Tablet 40 milliGRAM(s) Oral before breakfast  sevelamer carbonate 800 milliGRAM(s) Oral three times a day with meals  zinc sulfate 220 milliGRAM(s) Oral daily    MEDICATIONS  (PRN):  acetaminophen   Tablet .. 650 milliGRAM(s) Oral every 4 hours PRN Temp greater or equal to 38C (100.4F), Mild Pain (1 - 3), Moderate Pain (4 - 6), Severe Pain (7 - 10)  ALBUTerol    90 MICROgram(s) HFA Inhaler 2 Puff(s) Inhalation every 4 hours PRN Shortness of Breath and/or Wheezing  benzonatate 100 milliGRAM(s) Oral three times a day PRN Cough  dextrose 40% Gel 15 Gram(s) Oral once PRN Blood Glucose LESS THAN 70 milliGRAM(s)/deciliter  glucagon  Injectable 1 milliGRAM(s) IntraMuscular once PRN Glucose LESS THAN 70 milligrams/deciliter  glucagon  Injectable 1 milliGRAM(s) IntraMuscular once PRN Glucose LESS THAN 70 milligrams/deciliter  guaiFENesin   Syrup  (Sugar-Free) 200 milliGRAM(s) Oral every 6 hours PRN Cough  ondansetron Injectable 4 milliGRAM(s) IV Push every 12 hours PRN Nausea and/or Vomiting  oxycodone    5 mG/acetaminophen 325 mG 1 Tablet(s) Oral every 6 hours PRN Severe Pain (7 - 10)      Vital Signs Last 24 Hrs  T(C): 36.6 (12 Apr 2020 12:25), Max: 36.9 (12 Apr 2020 05:03)  T(F): 97.8 (12 Apr 2020 12:25), Max: 98.4 (12 Apr 2020 05:03)  HR: 73 (12 Apr 2020 12:25) (60 - 78)  BP: 135/74 (12 Apr 2020 12:25) (123/59 - 159/59)  BP(mean): --  RR: 20 (12 Apr 2020 12:25) (18 - 20)  SpO2: 93% (12 Apr 2020 12:25) (92% - 93%)    PHYSICAL EXAM:  GENERAL: NAD, well-developed  HEAD:  Atraumatic, Normocephalic  EYES: EOMI, PERRLA, conjunctiva and sclera clear  NECK: Supple, No JVD  CHEST/LUNG: Clear to auscultation bilaterally; No wheeze  HEART: Regular rate and rhythm; No murmurs, rubs, or gallops  ABDOMEN: Soft, Nontender, Nondistended; Bowel sounds present  EXTREMITIES:  2+ Peripheral Pulses, No clubbing, cyanosis, or edema  PSYCH: AAOx3  NEUROLOGY: non-focal  SKIN: No rashes or lesions    LABS:                        10.3   9.88  )-----------( 341      ( 12 Apr 2020 07:14 )             33.4     04-12    137  |  93<L>  |  32<H>  ----------------------------<  74  4.5   |  26  |  3.90<H>    Ca    8.7      12 Apr 2020 07:14  Phos  3.1     04-12  Mg     2.2     04-12    TPro  6.3  /  Alb  3.2<L>  /  TBili  0.2  /  DBili  x   /  AST  57<H>  /  ALT  50<H>  /  AlkPhos  146<H>  04-12                RADIOLOGY & ADDITIONAL TESTS:    Imaging Personally Reviewed:    Consultant(s) Notes Reviewed:      Care Discussed with Consultants/Other Providers:

## 2020-04-21 NOTE — PATIENT PROFILE ADULT. - LIMITATIONS ON VISITORS/PHONE CALLS
[facility]  Last Written Prescription Date:  1.3.2020  Last Fill Quantity: 60each   # refills: 0  Last Office Visit: 1.20.2020  Future Office visit:       Routing refill request to provider for review/approval because:  Drug not on the [facility], [facility] or [facility] refill protocol or controlled substance     none

## 2020-05-02 ENCOUNTER — INPATIENT (INPATIENT)
Facility: HOSPITAL | Age: 65
LOS: 4 days | Discharge: HOME | DRG: 602 | End: 2020-05-07
Attending: INTERNAL MEDICINE | Admitting: INTERNAL MEDICINE
Payer: MEDICARE

## 2020-05-02 VITALS
HEART RATE: 84 BPM | RESPIRATION RATE: 18 BRPM | TEMPERATURE: 98 F | DIASTOLIC BLOOD PRESSURE: 82 MMHG | OXYGEN SATURATION: 95 % | SYSTOLIC BLOOD PRESSURE: 121 MMHG

## 2020-05-02 DIAGNOSIS — L03.90 CELLULITIS, UNSPECIFIED: ICD-10-CM

## 2020-05-02 DIAGNOSIS — Z98.89 OTHER SPECIFIED POSTPROCEDURAL STATES: Chronic | ICD-10-CM

## 2020-05-02 LAB
ALBUMIN SERPL ELPH-MCNC: 3.9 G/DL — SIGNIFICANT CHANGE UP (ref 3.3–5)
ALP SERPL-CCNC: 246 U/L — HIGH (ref 40–120)
ALT FLD-CCNC: 10 U/L — SIGNIFICANT CHANGE UP (ref 10–45)
ANION GAP SERPL CALC-SCNC: 22 MMOL/L — HIGH (ref 5–17)
AST SERPL-CCNC: 14 U/L — SIGNIFICANT CHANGE UP (ref 10–40)
B-OH-BUTYR SERPL-SCNC: 0.2 MMOL/L — SIGNIFICANT CHANGE UP
BASE EXCESS BLDV CALC-SCNC: 2.5 MMOL/L — HIGH (ref -2–2)
BILIRUB SERPL-MCNC: 0.4 MG/DL — SIGNIFICANT CHANGE UP (ref 0.2–1.2)
BUN SERPL-MCNC: 29 MG/DL — HIGH (ref 7–23)
CA-I SERPL-SCNC: 1.18 MMOL/L — SIGNIFICANT CHANGE UP (ref 1.12–1.3)
CALCIUM SERPL-MCNC: 9.5 MG/DL — SIGNIFICANT CHANGE UP (ref 8.4–10.5)
CHLORIDE BLDV-SCNC: 96 MMOL/L — SIGNIFICANT CHANGE UP (ref 96–108)
CHLORIDE SERPL-SCNC: 92 MMOL/L — LOW (ref 96–108)
CO2 BLDV-SCNC: 31 MMOL/L — HIGH (ref 22–30)
CO2 SERPL-SCNC: 24 MMOL/L — SIGNIFICANT CHANGE UP (ref 22–31)
CREAT SERPL-MCNC: 5.34 MG/DL — HIGH (ref 0.5–1.3)
GAS PNL BLDV: 135 MMOL/L — SIGNIFICANT CHANGE UP (ref 135–145)
GAS PNL BLDV: SIGNIFICANT CHANGE UP
GAS PNL BLDV: SIGNIFICANT CHANGE UP
GLUCOSE BLDC GLUCOMTR-MCNC: 180 MG/DL — HIGH (ref 70–99)
GLUCOSE BLDC GLUCOMTR-MCNC: 313 MG/DL — HIGH (ref 70–99)
GLUCOSE BLDV-MCNC: 351 MG/DL — HIGH (ref 70–99)
GLUCOSE SERPL-MCNC: 364 MG/DL — HIGH (ref 70–99)
HCO3 BLDV-SCNC: 29 MMOL/L — SIGNIFICANT CHANGE UP (ref 21–29)
HCT VFR BLD CALC: 31.8 % — LOW (ref 34.5–45)
HCT VFR BLDA CALC: 31 % — LOW (ref 39–50)
HGB BLD CALC-MCNC: 10.1 G/DL — LOW (ref 11.5–15.5)
HGB BLD-MCNC: 9.8 G/DL — LOW (ref 11.5–15.5)
LACTATE BLDV-MCNC: 1.6 MMOL/L — SIGNIFICANT CHANGE UP (ref 0.7–2)
MAGNESIUM SERPL-MCNC: 2.5 MG/DL — SIGNIFICANT CHANGE UP (ref 1.6–2.6)
MCHC RBC-ENTMCNC: 30.8 GM/DL — LOW (ref 32–36)
MCHC RBC-ENTMCNC: 34.9 PG — HIGH (ref 27–34)
MCV RBC AUTO: 113.2 FL — HIGH (ref 80–100)
NRBC # BLD: 0 /100 WBCS — SIGNIFICANT CHANGE UP (ref 0–0)
PCO2 BLDV: 58 MMHG — HIGH (ref 35–50)
PH BLDV: 7.32 — LOW (ref 7.35–7.45)
PHOSPHATE SERPL-MCNC: 6.5 MG/DL — HIGH (ref 2.5–4.5)
PLATELET # BLD AUTO: 249 K/UL — SIGNIFICANT CHANGE UP (ref 150–400)
PO2 BLDV: 28 MMHG — SIGNIFICANT CHANGE UP (ref 25–45)
POTASSIUM BLDV-SCNC: 5.1 MMOL/L — SIGNIFICANT CHANGE UP (ref 3.5–5.3)
POTASSIUM SERPL-MCNC: 5.3 MMOL/L — SIGNIFICANT CHANGE UP (ref 3.5–5.3)
POTASSIUM SERPL-SCNC: 5.3 MMOL/L — SIGNIFICANT CHANGE UP (ref 3.5–5.3)
PROT SERPL-MCNC: 6.9 G/DL — SIGNIFICANT CHANGE UP (ref 6–8.3)
RBC # BLD: 2.81 M/UL — LOW (ref 3.8–5.2)
RBC # FLD: 18.6 % — HIGH (ref 10.3–14.5)
SAO2 % BLDV: 35 % — LOW (ref 67–88)
SODIUM SERPL-SCNC: 138 MMOL/L — SIGNIFICANT CHANGE UP (ref 135–145)
WBC # BLD: 4.92 K/UL — SIGNIFICANT CHANGE UP (ref 3.8–10.5)
WBC # FLD AUTO: 4.92 K/UL — SIGNIFICANT CHANGE UP (ref 3.8–10.5)

## 2020-05-02 PROCEDURE — 93010 ELECTROCARDIOGRAM REPORT: CPT

## 2020-05-02 PROCEDURE — 99285 EMERGENCY DEPT VISIT HI MDM: CPT | Mod: CS,GC

## 2020-05-02 PROCEDURE — 93970 EXTREMITY STUDY: CPT | Mod: 26

## 2020-05-02 RX ORDER — INSULIN LISPRO 100/ML
2 VIAL (ML) SUBCUTANEOUS
Refills: 0 | Status: DISCONTINUED | OUTPATIENT
Start: 2020-05-02 | End: 2020-05-04

## 2020-05-02 RX ORDER — LISINOPRIL 2.5 MG/1
40 TABLET ORAL DAILY
Refills: 0 | Status: DISCONTINUED | OUTPATIENT
Start: 2020-05-02 | End: 2020-05-03

## 2020-05-02 RX ORDER — MIDODRINE HYDROCHLORIDE 2.5 MG/1
10 TABLET ORAL THREE TIMES A DAY
Refills: 0 | Status: DISCONTINUED | OUTPATIENT
Start: 2020-05-02 | End: 2020-05-03

## 2020-05-02 RX ORDER — OXYCODONE AND ACETAMINOPHEN 5; 325 MG/1; MG/1
1 TABLET ORAL EVERY 6 HOURS
Refills: 0 | Status: DISCONTINUED | OUTPATIENT
Start: 2020-05-02 | End: 2020-05-07

## 2020-05-02 RX ORDER — BUDESONIDE, MICRONIZED 100 %
0.5 POWDER (GRAM) MISCELLANEOUS
Refills: 0 | Status: DISCONTINUED | OUTPATIENT
Start: 2020-05-02 | End: 2020-05-03

## 2020-05-02 RX ORDER — ATORVASTATIN CALCIUM 80 MG/1
20 TABLET, FILM COATED ORAL AT BEDTIME
Refills: 0 | Status: DISCONTINUED | OUTPATIENT
Start: 2020-05-02 | End: 2020-05-07

## 2020-05-02 RX ORDER — SEVELAMER CARBONATE 2400 MG/1
2400 POWDER, FOR SUSPENSION ORAL
Refills: 0 | Status: DISCONTINUED | OUTPATIENT
Start: 2020-05-02 | End: 2020-05-07

## 2020-05-02 RX ORDER — METOPROLOL TARTRATE 50 MG
50 TABLET ORAL DAILY
Refills: 0 | Status: DISCONTINUED | OUTPATIENT
Start: 2020-05-02 | End: 2020-05-07

## 2020-05-02 RX ORDER — INSULIN ASPART 100 [IU]/ML
0 INJECTION, SOLUTION SUBCUTANEOUS
Qty: 0 | Refills: 0 | DISCHARGE

## 2020-05-02 RX ORDER — INSULIN LISPRO 100/ML
VIAL (ML) SUBCUTANEOUS
Refills: 0 | Status: DISCONTINUED | OUTPATIENT
Start: 2020-05-02 | End: 2020-05-07

## 2020-05-02 RX ORDER — SODIUM CHLORIDE 9 MG/ML
1000 INJECTION, SOLUTION INTRAVENOUS
Refills: 0 | Status: DISCONTINUED | OUTPATIENT
Start: 2020-05-02 | End: 2020-05-07

## 2020-05-02 RX ORDER — INSULIN LISPRO 100/ML
VIAL (ML) SUBCUTANEOUS AT BEDTIME
Refills: 0 | Status: DISCONTINUED | OUTPATIENT
Start: 2020-05-02 | End: 2020-05-07

## 2020-05-02 RX ORDER — CEFAZOLIN SODIUM 1 G
1000 VIAL (EA) INJECTION EVERY 24 HOURS
Refills: 0 | Status: DISCONTINUED | OUTPATIENT
Start: 2020-05-02 | End: 2020-05-07

## 2020-05-02 RX ORDER — DEXTROSE 50 % IN WATER 50 %
25 SYRINGE (ML) INTRAVENOUS ONCE
Refills: 0 | Status: DISCONTINUED | OUTPATIENT
Start: 2020-05-02 | End: 2020-05-07

## 2020-05-02 RX ORDER — INSULIN GLARGINE 100 [IU]/ML
12 INJECTION, SOLUTION SUBCUTANEOUS
Qty: 0 | Refills: 0 | DISCHARGE

## 2020-05-02 RX ORDER — INSULIN GLARGINE 100 [IU]/ML
13 INJECTION, SOLUTION SUBCUTANEOUS AT BEDTIME
Refills: 0 | Status: DISCONTINUED | OUTPATIENT
Start: 2020-05-02 | End: 2020-05-04

## 2020-05-02 RX ORDER — ASPIRIN/CALCIUM CARB/MAGNESIUM 324 MG
81 TABLET ORAL DAILY
Refills: 0 | Status: DISCONTINUED | OUTPATIENT
Start: 2020-05-02 | End: 2020-05-07

## 2020-05-02 RX ORDER — INSULIN ASPART 100 [IU]/ML
3 INJECTION, SOLUTION SUBCUTANEOUS
Qty: 0 | Refills: 0 | DISCHARGE

## 2020-05-02 RX ORDER — HEPARIN SODIUM 5000 [USP'U]/ML
5000 INJECTION INTRAVENOUS; SUBCUTANEOUS EVERY 12 HOURS
Refills: 0 | Status: DISCONTINUED | OUTPATIENT
Start: 2020-05-02 | End: 2020-05-07

## 2020-05-02 RX ORDER — CLOPIDOGREL BISULFATE 75 MG/1
75 TABLET, FILM COATED ORAL DAILY
Refills: 0 | Status: DISCONTINUED | OUTPATIENT
Start: 2020-05-02 | End: 2020-05-07

## 2020-05-02 RX ORDER — OXYCODONE HYDROCHLORIDE 5 MG/1
10 TABLET ORAL ONCE
Refills: 0 | Status: DISCONTINUED | OUTPATIENT
Start: 2020-05-02 | End: 2020-05-02

## 2020-05-02 RX ORDER — DEXTROSE 50 % IN WATER 50 %
12.5 SYRINGE (ML) INTRAVENOUS ONCE
Refills: 0 | Status: DISCONTINUED | OUTPATIENT
Start: 2020-05-02 | End: 2020-05-07

## 2020-05-02 RX ORDER — PANTOPRAZOLE SODIUM 20 MG/1
1 TABLET, DELAYED RELEASE ORAL
Qty: 0 | Refills: 0 | DISCHARGE

## 2020-05-02 RX ORDER — CEFTRIAXONE 500 MG/1
1000 INJECTION, POWDER, FOR SOLUTION INTRAMUSCULAR; INTRAVENOUS ONCE
Refills: 0 | Status: COMPLETED | OUTPATIENT
Start: 2020-05-02 | End: 2020-05-02

## 2020-05-02 RX ORDER — ACETAMINOPHEN 500 MG
650 TABLET ORAL EVERY 6 HOURS
Refills: 0 | Status: DISCONTINUED | OUTPATIENT
Start: 2020-05-02 | End: 2020-05-07

## 2020-05-02 RX ORDER — GLUCAGON INJECTION, SOLUTION 0.5 MG/.1ML
1 INJECTION, SOLUTION SUBCUTANEOUS ONCE
Refills: 0 | Status: DISCONTINUED | OUTPATIENT
Start: 2020-05-02 | End: 2020-05-07

## 2020-05-02 RX ORDER — ACETAMINOPHEN 500 MG
975 TABLET ORAL ONCE
Refills: 0 | Status: COMPLETED | OUTPATIENT
Start: 2020-05-02 | End: 2020-05-02

## 2020-05-02 RX ORDER — INSULIN ASPART 100 [IU]/ML
4 INJECTION, SOLUTION SUBCUTANEOUS
Qty: 0 | Refills: 0 | DISCHARGE

## 2020-05-02 RX ORDER — INSULIN LISPRO 100/ML
5 VIAL (ML) SUBCUTANEOUS ONCE
Refills: 0 | Status: COMPLETED | OUTPATIENT
Start: 2020-05-02 | End: 2020-05-02

## 2020-05-02 RX ORDER — DEXTROSE 50 % IN WATER 50 %
15 SYRINGE (ML) INTRAVENOUS ONCE
Refills: 0 | Status: DISCONTINUED | OUTPATIENT
Start: 2020-05-02 | End: 2020-05-07

## 2020-05-02 RX ADMIN — OXYCODONE HYDROCHLORIDE 10 MILLIGRAM(S): 5 TABLET ORAL at 17:42

## 2020-05-02 RX ADMIN — Medication 975 MILLIGRAM(S): at 17:20

## 2020-05-02 RX ADMIN — Medication 100 MILLIGRAM(S): at 22:43

## 2020-05-02 RX ADMIN — CEFTRIAXONE 100 MILLIGRAM(S): 500 INJECTION, POWDER, FOR SOLUTION INTRAMUSCULAR; INTRAVENOUS at 19:15

## 2020-05-02 RX ADMIN — ATORVASTATIN CALCIUM 20 MILLIGRAM(S): 80 TABLET, FILM COATED ORAL at 22:44

## 2020-05-02 RX ADMIN — SEVELAMER CARBONATE 2400 MILLIGRAM(S): 2400 POWDER, FOR SUSPENSION ORAL at 22:45

## 2020-05-02 RX ADMIN — INSULIN GLARGINE 13 UNIT(S): 100 INJECTION, SOLUTION SUBCUTANEOUS at 22:44

## 2020-05-02 RX ADMIN — Medication 5 UNIT(S): at 19:21

## 2020-05-02 NOTE — ED ADULT NURSE NOTE - OBJECTIVE STATEMENT
Bilateral leg pain.  Reports left leg redness and swelling that is new today.  Able to move her leg but with pain.  Also reports missing dialysis that is supposed to be today at 630pm.  Normally goes Mon, Wed, Thurs, Satur.  Patient has positive bruit and thrill to the left arm.  Patient talking in full sentences and orientedx4.

## 2020-05-02 NOTE — H&P ADULT - NSHPPHYSICALEXAM_GEN_ALL_CORE
PHYSICAL EXAMINATION:  Vital Signs Last 24 Hrs  T(C): 36.7 (02 May 2020 20:05), Max: 36.7 (02 May 2020 16:24)  T(F): 98 (02 May 2020 20:05), Max: 98.1 (02 May 2020 16:24)  HR: 88 (02 May 2020 20:05) (84 - 88)  BP: 133/78 (02 May 2020 20:05) (121/82 - 133/78)  BP(mean): --  RR: 18 (02 May 2020 20:05) (18 - 18)  SpO2: 96% (02 May 2020 20:05) (95% - 96%)  CAPILLARY BLOOD GLUCOSE      POCT Blood Glucose.: 313 mg/dL (02 May 2020 20:02)  POCT Blood Glucose.: 386 mg/dL (02 May 2020 18:58)  POCT Blood Glucose.: 339 mg/dL (02 May 2020 17:18)      GENERAL: NAD, well-groomed, well-developed  HEAD:  atraumatic, normocephalic  EYES: sclera anicteric  ENMT: mucous membranes moist  NECK: supple, No JVD  CHEST/LUNG: clear to auscultation bilaterally; no rales, rhonchi, or wheezing b/l  HEART: irreg S1, S2  ABDOMEN: BS+, soft, ND, NT   EXTREMITIES:  L leg with 2+ edema , erythema and increased local temp  NEURO: awake, alert, interactive; moves all extremities  SKIN: no rashes or lesions

## 2020-05-02 NOTE — ED PROVIDER NOTE - ATTENDING CONTRIBUTION TO CARE
I performed a history and physical exam of the patient and discussed their management with the resident.  I reviewed the resident's note and agree with the documented findings and plan of care except as noted below. My medical decision making and observations are as follows:    64 F PMH CAD s/p PTCA w/ stents, CHF, COPD, CVA, DMT1, ESRD on HD (M,Tu,Th,Sa), Gout, HLD, PVD, Subclavian Stenosis (L) s/p stent. PSH s/p ASD Repair, s/p idania, s/p fem-pop bypass, recurrent admission for hyper/hypoglycemia/DKA, p/w a 1 day history of worsening LLE swelling, erythema and pain.  Pt has some leg swelling at her baseline, but yesterday the LLE swelling got worse and became red and painful.  Pt denies fevers, chest pain, shortness of breath, nausea/vomiting/diarrhea.  Pt is A&O x 3, heart rrr, lungs cta, abd soft ntnd, LLE with erythema, swelling, warmth and TTP, mild swelling with hyperpigmentation to RLE.  Suspect cellulitis, lower suspicion for DVT but due to assymetry, will chek venous duplex.  LAbs, cultures, vbg and beta hydroxy buterate given elevated blood sugar and possible DKA.  Pt will require admission.

## 2020-05-02 NOTE — ED PROVIDER NOTE - PHYSICAL EXAMINATION
GENERAL: appears uncomfortable, well-developed  HEAD:  Atraumatic, Normocephalic  ENT: conjunctiva and sclera clear, neck supple, no JVD, moist mucosa, posterior oropharynx clear  CHEST/LUNG: decreased air entry; No wheeze, equal breath sounds bilaterally, respirations nonlabored  HEART: Regular rate and rhythm; No murmurs, rubs, or gallops  ABDOMEN: Soft, nontender, nondistended; Bowel sounds present, no organomegaly  BACK: no spinal tenderness, no CVA tenderness  EXTREMITIES:  LLE swelling and erythema, tender to palpation; RLE erythema, tender to palpation  PSYCH: Nl behavior, nl affect

## 2020-05-02 NOTE — H&P ADULT - NSHPLABSRESULTS_GEN_ALL_CORE
9.8    4.92  )-----------( 249      ( 02 May 2020 17:21 )             31.8       05-02    138  |  92<L>  |  29<H>  ----------------------------<  364<H>  5.3   |  24  |  5.34<H>    Ca    9.5      02 May 2020 17:21  Phos  6.5     05-02  Mg     2.5     05-02    TPro  6.9  /  Alb  3.9  /  TBili  0.4  /  DBili  x   /  AST  14  /  ALT  10  /  AlkPhos  246<H>  05-02              POCT Blood Glucose.: 313 mg/dL (02 May 2020 20:02)    < from: [facility] Duplex Lower Ext Vein Scan, Bilat (05.02.20 @ 18:31) >      IMPRESSION:     No evidence of deep venous thrombosis in either lower extremity.    < end of copied text >    < from: Xray Chest 1 View AP/PA (04.07.20 @ 17:51) >    IMPRESSION:   Redemonstration of bilateral peripheral opacities, differential includes infectious consolidation such as COVID 19.     < end of copied text >

## 2020-05-02 NOTE — H&P ADULT - NSHPREVIEWOFSYSTEMS_GEN_ALL_CORE
REVIEW OF SYSTEMS:    CONSTITUTIONAL: + weakness, + fevers + chills  EYES/ENT: No visual changes;  No vertigo or throat pain   NECK: No pain or stiffness  RESPIRATORY: No cough, wheezing, hemoptysis; No shortness of breath  CARDIOVASCULAR: No chest pain or palpitations  GASTROINTESTINAL: No abdominal or epigastric pain. No nausea, vomiting, or hematemesis; No diarrhea or constipation. No melena or hematochezia.  GENITOURINARY: No dysuria, frequency or hematuria  NEUROLOGICAL: No numbness or weakness  SKIN: L leg erythema and pain   All other review of systems is negative unless indicated above.

## 2020-05-02 NOTE — ED PROVIDER NOTE - OBJECTIVE STATEMENT
Pt is a 64 F PMH CAD Sp PTCA w/ stents, CHF, COPD, CVA, DMT1, ESRD on HD (M,Tu,Th,Sa), Gout, HLD, PVD, Subclavian Stenosis (L) s/p stent. PSH s/p ASD Repair, s/p idania, s/p fem-pop bypass, recurrent admission for hyper/hypoglycemia/DKA, p/w a 1 day history of worsening LLE swelling, erythema, and warmth. Pt states that she has b/L leg swelling at baseline, but over the past day, she noticed that her left leg has become more swollen and red, states that it is diffusely painful with 10/10 intensity. Pt also has RLE erythema with mild tenderness, but less swelling. She d Pt is a 64 F PMH CAD Sp PTCA w/ stents, CHF, COPD, CVA, DMT1, ESRD on HD (M,Tu,Th,Sa), Gout, HLD, PVD, Subclavian Stenosis (L) s/p stent. PSH s/p ASD Repair, s/p idania, s/p fem-pop bypass, recurrent admission for hyper/hypoglycemia/DKA, p/w a 1 day history of worsening LLE swelling, erythema, and warmth. Pt states that she has b/L leg swelling at baseline, but over the past day, she noticed that her left leg has become more swollen and red, states that it is diffusely painful with 10/10 intensity. Pt also has RLE erythema with mild tenderness, but less swelling. She denies dizziness, cough, CP, SOB, abdominal pain, diarrhea. Pt is a 64 F PMH CAD Sp PTCA w/ stents, CHF, COPD, CVA, DMT1, ESRD on HD (M,Tu,Th,Sa), Gout, HLD, PVD, Subclavian Stenosis (L) s/p stent. PSH s/p ASD Repair, s/p idania, s/p fem-pop bypass, recurrent admission for hyper/hypoglycemia/DKA, p/w a 1 day history of worsening LLE swelling, erythema, and warmth. Pt states that she has b/L leg swelling at baseline, but over the past day, she noticed that her left leg has become more swollen and red, states that it is diffusely painful with 10/10 intensity. Pt also has RLE erythema with mild tenderness, but less swelling. She denies dizziness, cough, CP, SOB worse than baseline, abdominal pain, diarrhea. Did not have HD today.

## 2020-05-02 NOTE — ED PROVIDER NOTE - NS ED ROS FT
CONSTITUTIONAL: No weakness, fevers or chills  EYES/ENT: No visual changes;  No dysphagia  NECK: No pain or stiffness  RESPIRATORY: No cough, wheezing, hemoptysis; baseline shortness of breath  CARDIOVASCULAR: No chest pain or palpitations  GASTROINTESTINAL: No abdominal or epigastric pain. No nausea, vomiting, or hematemesis; No diarrhea or constipation. No melena or hematochezia.  GENITOURINARY: anuric  NEUROLOGICAL: No numbness or weakness  HEMATOLOGY: No easy bleeding, no lymphadenopathy  EXT: LLE warmth, swelling, and pain. RLE redness and pain.

## 2020-05-02 NOTE — H&P ADULT - HISTORY OF PRESENT ILLNESS
64 F PMH CAD Sp PTCA w/ stents, CHF, COPD, CVA, DMT1, ESRD on HD (M,Tu,Th,Sa), Gout, HLD, PVD, Subclavian Stenosis (L) s/p stent. PSH s/p ASD Repair, s/p idania, s/p fem-pop bypass, recurrent admission for hyper/hypoglycemia/DKA, p/w a 1 day history of worsening LLE swelling, erythema, and warmth. Pt states that she has b/L leg swelling at baseline, but over the past day, she noticed that her left leg has become more swollen and red, states that it is diffusely painful with 10/10 intensity. Pt also has RLE erythema with mild tenderness, but less swelling. She denies dizziness, cough, CP, SOB worse than baseline, abdominal pain, diarrhea. Did not have HD today.

## 2020-05-02 NOTE — ED PROVIDER NOTE - CLINICAL SUMMARY MEDICAL DECISION MAKING FREE TEXT BOX
64F w/ h/o CAD Sp PTCA w stents CHF, COPD, CVA, DMT1, ESRD on HD (M,Tu,Th,Sa), Gout, HLD, PVD, Sublcavian Stensosis (L) sp stent. PSH ASD Repair,idania,fem-pop bypass, recurrent admission for hyper/hypoglycemia/DKA p/w 1 day history of LLE swelling, warmth, erythema, and pain. Will obtain routine labs, blood cultures, BHB, LLE doppler to r/o DVT vs cellulitis. Pain control.

## 2020-05-03 LAB
ALBUMIN SERPL ELPH-MCNC: 3.8 G/DL — SIGNIFICANT CHANGE UP (ref 3.3–5)
ALP SERPL-CCNC: 219 U/L — HIGH (ref 40–120)
ALT FLD-CCNC: 7 U/L — LOW (ref 10–45)
ANION GAP SERPL CALC-SCNC: 23 MMOL/L — HIGH (ref 5–17)
AST SERPL-CCNC: 14 U/L — SIGNIFICANT CHANGE UP (ref 10–40)
BILIRUB SERPL-MCNC: 0.3 MG/DL — SIGNIFICANT CHANGE UP (ref 0.2–1.2)
BUN SERPL-MCNC: 34 MG/DL — HIGH (ref 7–23)
CALCIUM SERPL-MCNC: 9.1 MG/DL — SIGNIFICANT CHANGE UP (ref 8.4–10.5)
CHLORIDE SERPL-SCNC: 90 MMOL/L — LOW (ref 96–108)
CO2 SERPL-SCNC: 26 MMOL/L — SIGNIFICANT CHANGE UP (ref 22–31)
CREAT SERPL-MCNC: 6.69 MG/DL — HIGH (ref 0.5–1.3)
GLUCOSE BLDC GLUCOMTR-MCNC: 134 MG/DL — HIGH (ref 70–99)
GLUCOSE BLDC GLUCOMTR-MCNC: 237 MG/DL — HIGH (ref 70–99)
GLUCOSE BLDC GLUCOMTR-MCNC: 260 MG/DL — HIGH (ref 70–99)
GLUCOSE BLDC GLUCOMTR-MCNC: 55 MG/DL — LOW (ref 70–99)
GLUCOSE BLDC GLUCOMTR-MCNC: 61 MG/DL — LOW (ref 70–99)
GLUCOSE BLDC GLUCOMTR-MCNC: 75 MG/DL — SIGNIFICANT CHANGE UP (ref 70–99)
GLUCOSE BLDC GLUCOMTR-MCNC: 99 MG/DL — SIGNIFICANT CHANGE UP (ref 70–99)
GLUCOSE SERPL-MCNC: 229 MG/DL — HIGH (ref 70–99)
HCT VFR BLD CALC: 31.1 % — LOW (ref 34.5–45)
HGB BLD-MCNC: 9.4 G/DL — LOW (ref 11.5–15.5)
MCHC RBC-ENTMCNC: 30.2 GM/DL — LOW (ref 32–36)
MCHC RBC-ENTMCNC: 33.7 PG — SIGNIFICANT CHANGE UP (ref 27–34)
MCV RBC AUTO: 111.5 FL — HIGH (ref 80–100)
NRBC # BLD: 0 /100 WBCS — SIGNIFICANT CHANGE UP (ref 0–0)
PLATELET # BLD AUTO: 269 K/UL — SIGNIFICANT CHANGE UP (ref 150–400)
POTASSIUM SERPL-MCNC: 5.5 MMOL/L — HIGH (ref 3.5–5.3)
POTASSIUM SERPL-SCNC: 5.5 MMOL/L — HIGH (ref 3.5–5.3)
PROT SERPL-MCNC: 6.7 G/DL — SIGNIFICANT CHANGE UP (ref 6–8.3)
RBC # BLD: 2.79 M/UL — LOW (ref 3.8–5.2)
RBC # FLD: 18.4 % — HIGH (ref 10.3–14.5)
SARS-COV-2 RNA SPEC QL NAA+PROBE: DETECTED
SODIUM SERPL-SCNC: 139 MMOL/L — SIGNIFICANT CHANGE UP (ref 135–145)
WBC # BLD: 4.2 K/UL — SIGNIFICANT CHANGE UP (ref 3.8–10.5)
WBC # FLD AUTO: 4.2 K/UL — SIGNIFICANT CHANGE UP (ref 3.8–10.5)

## 2020-05-03 RX ORDER — ALBUTEROL 90 UG/1
2 AEROSOL, METERED ORAL EVERY 6 HOURS
Refills: 0 | Status: DISCONTINUED | OUTPATIENT
Start: 2020-05-03 | End: 2020-05-07

## 2020-05-03 RX ORDER — LISINOPRIL 2.5 MG/1
20 TABLET ORAL DAILY
Refills: 0 | Status: DISCONTINUED | OUTPATIENT
Start: 2020-05-03 | End: 2020-05-04

## 2020-05-03 RX ADMIN — SEVELAMER CARBONATE 2400 MILLIGRAM(S): 2400 POWDER, FOR SUSPENSION ORAL at 08:55

## 2020-05-03 RX ADMIN — Medication 100 MILLIGRAM(S): at 21:23

## 2020-05-03 RX ADMIN — Medication 50 MILLIGRAM(S): at 06:02

## 2020-05-03 RX ADMIN — Medication 2 UNIT(S): at 18:08

## 2020-05-03 RX ADMIN — INSULIN GLARGINE 13 UNIT(S): 100 INJECTION, SOLUTION SUBCUTANEOUS at 21:19

## 2020-05-03 RX ADMIN — Medication 2 UNIT(S): at 08:54

## 2020-05-03 RX ADMIN — LISINOPRIL 40 MILLIGRAM(S): 2.5 TABLET ORAL at 06:03

## 2020-05-03 RX ADMIN — MIDODRINE HYDROCHLORIDE 10 MILLIGRAM(S): 2.5 TABLET ORAL at 12:14

## 2020-05-03 RX ADMIN — ATORVASTATIN CALCIUM 20 MILLIGRAM(S): 80 TABLET, FILM COATED ORAL at 21:20

## 2020-05-03 RX ADMIN — SEVELAMER CARBONATE 2400 MILLIGRAM(S): 2400 POWDER, FOR SUSPENSION ORAL at 12:13

## 2020-05-03 RX ADMIN — CLOPIDOGREL BISULFATE 75 MILLIGRAM(S): 75 TABLET, FILM COATED ORAL at 12:25

## 2020-05-03 RX ADMIN — SEVELAMER CARBONATE 2400 MILLIGRAM(S): 2400 POWDER, FOR SUSPENSION ORAL at 18:09

## 2020-05-03 RX ADMIN — HEPARIN SODIUM 5000 UNIT(S): 5000 INJECTION INTRAVENOUS; SUBCUTANEOUS at 18:10

## 2020-05-03 RX ADMIN — Medication 2: at 18:07

## 2020-05-03 RX ADMIN — MIDODRINE HYDROCHLORIDE 10 MILLIGRAM(S): 2.5 TABLET ORAL at 06:02

## 2020-05-03 RX ADMIN — Medication 81 MILLIGRAM(S): at 12:13

## 2020-05-03 NOTE — CONSULT NOTE ADULT - SUBJECTIVE AND OBJECTIVE BOX
CHIEF COMPLAINT: leg pain;swelling    HISTORY OF PRESENT ILLNESS:  64 F PMH CAD Sp PTCA w/ stents, CHF, COPD, CVA, DMT1, ESRD on HD (M,Tu,Th,Sa), Gout, HLD, PVD, Subclavian Stenosis (L) s/p stent. PSH s/p ASD Repair, s/p idania, s/p fem-pop bypass, recurrent admission for hyper/hypoglycemia/DKA, p/w a 1 day history of worsening LLE swelling, erythema, and warmth. Pt states that she has b/L leg swelling at baseline, but over the past day, she noticed that her left leg has become more swollen and red, states that it is diffusely painful with 10/10 intensity. Pt also has RLE erythema with mild tenderness, but less swelling. She denies dizziness, cough, CP, SOB worse than baseline, abdominal pain, diarrhea. Did not have HD today.        Allergies  No Known Allergies    MEDICATIONS:  aspirin enteric coated 81 milliGRAM(s) Oral daily  clopidogrel Tablet 75 milliGRAM(s) Oral daily  heparin   Injectable 5000 Unit(s) SubCutaneous every 12 hours  lisinopril 40 milliGRAM(s) Oral daily  metoprolol succinate ER 50 milliGRAM(s) Oral daily  midodrine. 10 milliGRAM(s) Oral three times a day  ceFAZolin   IVPB 1000 milliGRAM(s) IV Intermittent every 24 hours  ALBUTerol    90 MICROgram(s) HFA Inhaler 2 Puff(s) Inhalation every 6 hours PRN  acetaminophen   Tablet .. 650 milliGRAM(s) Oral every 6 hours PRN  oxycodone    5 mG/acetaminophen 325 mG 1 Tablet(s) Oral every 6 hours PRN      atorvastatin 20 milliGRAM(s) Oral at bedtime  dextrose 40% Gel 15 Gram(s) Oral once PRN  dextrose 50% Injectable 12.5 Gram(s) IV Push once  dextrose 50% Injectable 25 Gram(s) IV Push once  dextrose 50% Injectable 25 Gram(s) IV Push once  glucagon  Injectable 1 milliGRAM(s) IntraMuscular once PRN  insulin glargine Injectable (LANTUS) 13 Unit(s) SubCutaneous at bedtime  insulin lispro (HumaLOG) corrective regimen sliding scale   SubCutaneous three times a day before meals  insulin lispro (HumaLOG) corrective regimen sliding scale   SubCutaneous at bedtime  insulin lispro Injectable (HumaLOG) 2 Unit(s) SubCutaneous before breakfast  insulin lispro Injectable (HumaLOG) 2 Unit(s) SubCutaneous before lunch  insulin lispro Injectable (HumaLOG) 2 Unit(s) SubCutaneous before dinner    dextrose 5%. 1000 milliLiter(s) IV Continuous <Continuous>      PAST MEDICAL & SURGICAL HISTORY:  COPD (chronic obstructive pulmonary disease)  Localized enlarged lymph nodes  CHF (congestive heart failure): EF 40-45%  Subclavian vein stenosis, left: s/p stent  DKA, type 1: 1/2015  ACS (acute coronary syndrome): 1/2015 - cath revealed 100% ostial stenosis not amenable to PCI - medical management  TIA (transient ischemic attack): x 2 - 8-9 years ago prior to ASD/VSD repair  CAD (coronary artery disease): s/p stents  Gout: past  CVA (cerebral infarction): with no residual, 8 yrs ago, prior to heart surgery - ST memory loss  Peripheral vascular disease: occluded left fem-pop bypass 5/2015  Diabetes mellitus type 1: Insulin Dependent -  ESRD (end stage renal disease): dialysis  M, tue, thursday, saturday  Hyperlipidemia  Status post device closure of ASD: &quot;clamshell&quot;  History of cardiac catheterization: 1/2015 - no intervention  S/P femoral-popliteal bypass surgery: L and R in 2013 with graft; 5/2015 CFA angioplasty left and ileofemoral endarterectomywith vein patch angioplasty of left fem-pop bypass graft  Multiple vascular surgery both leg, left fempop bypass revision 11/2015  AV (arteriovenous fistula): Left AV graft; revision with stent placement 2-3 years ago  S/P cholecystectomy      FAMILY HISTORY:  Family history of smoking  Family history of hypertension  Family history of cancer (Sibling)      SOCIAL HISTORY:    former smoker. indep in adl. lives with       REVIEW OF SYSTEMS:  See HPI, otherwise complete 10 point review of systems negative    [ ] All others negative	      PHYSICAL EXAM:  T(C): 37 (05-03-20 @ 08:36), Max: 37 (05-03-20 @ 08:36)  HR: 93 (05-03-20 @ 08:36) (70 - 93)  BP: 142/76 (05-03-20 @ 08:36) (121/82 - 148/77)  RR: 20 (05-03-20 @ 08:36) (18 - 20)  SpO2: 96% (05-03-20 @ 08:36) (94% - 96%)  Wt(kg): --  I&O's Summary    02 May 2020 07:01  -  03 May 2020 07:00  --------------------------------------------------------  IN: 410 mL / OUT: 0 mL / NET: 410 mL        Appearance: No Acute Distress	  HEENT:  Normal oral mucosa, PERRL, EOMI	  Cardiovascular: Normal S1 S2, No JVD, 2/6 heraclio  Respiratory: Lungs clear to auscultation bilaterally  Gastrointestinal:  Soft, Non-tender, + BS	  Skin: No rashes, No ecchymoses, No cyanosis	  Neurologic: Non-focal  Extremities: + edema  Vascular: dp/pt not palpable  Psychiatry: A & O x 3, Mood & affect appropriate    Laboratory Data:	 	    CBC Full  -  ( 02 May 2020 17:21 )  WBC Count : 4.92 K/uL  Hemoglobin : 9.8 g/dL  Hematocrit : 31.8 %  Platelet Count - Automated : 249 K/uL  Mean Cell Volume : 113.2 fl  Mean Cell Hemoglobin : 34.9 pg  Mean Cell Hemoglobin Concentration : 30.8 gm/dL  Auto Neutrophil # : x  Auto Lymphocyte # : x  Auto Monocyte # : x  Auto Eosinophil # : x  Auto Basophil # : x  Auto Neutrophil % : x  Auto Lymphocyte % : x  Auto Monocyte % : x  Auto Eosinophil % : x  Auto Basophil % : x    05-02    138  |  92<L>  |  29<H>  ----------------------------<  364<H>  5.3   |  24  |  5.34<H>    Ca    9.5      02 May 2020 17:21  Phos  6.5     05-02  Mg     2.5     05-02    TPro  6.9  /  Alb  3.9  /  TBili  0.4  /  DBili  x   /  AST  14  /  ALT  10  /  AlkPhos  246<H>  05-02      proBNP:   Lipid Profile:   HgA1c:   TSH:           Assessment:  -leg pain/swelling --> likely component of chf/volume overload + severe pad/CLI  -s/p novel coronavirus/sars-cov2 infection 4/7/20  -ischemic cardiomyopathy c/b mod to severe LV dysfunction, cad s/p multiple stents  -esrd on hd  -PAD s/p prior interventions  -copd       Recs:  -optimize vol status with hd. dr mildred blevins following  -c/w dapt and statin for cad/pad. extensive CAD and ICM. s/p [facility] 2/20/2019 --> severe and diffuse instent restenosis along RCA --> unable to stent due to multiple layers of stenting and prior brachytherapy  -c/w metop and lisinopril for gdmt for lv dysfunction; NSVT and pAT  -would d/c midodrine as can worsen vasoconstriction/PAD sx. consider lowering lisinopril to 20mg from 40mg to avoid iatrogenic hypotension  -s/p TTE 2019: mod-severe MR, pseudo AS (low flow-low gradient), mod-severe LV dysfunction --> plan is for medical management given surgical risk and patient preference  -dvt ppx      Greater than 60 minutes spent on total encounter; more than 50% of the visit was spent counseling and/or coordinating care by the attending physician.   	  Julián Biswas MD   Cardiovascular Diseases  [phone]

## 2020-05-03 NOTE — CONSULT NOTE ADULT - SUBJECTIVE AND OBJECTIVE BOX
[facility]  [phone]  NEPHROLOGY      INITIAL CONSULT NOTE  --------------------------------------------------------------------------------  HPI:      64 F PMH CAD Sp PTCA w/ stents, CHF, COPD, CVA, DMT1, ESRD on HD (M,Tu,Th,Sa), Gout, HLD, PVD, Subclavian Stenosis (L) s/p stent. PSH s/p ASD Repair, s/p idania, s/p fem-pop bypass, recurrent admission for hyper/hypoglycemia/DKA, p/w a 1 day history of worsening LLE swelling, erythema, and warmth.  recent admission april 12 dc after covid 19 + . over the past day, she noticed that her left leg has become more swollen and red, states that it is diffusely painful with 10/10 intensity. Pt also has RLE erythema with mild tenderness, but less swelling. esrd. renal consult called given hyperkalemia     PAST HISTORY  --------------------------------------------------------------------------------  PAST MEDICAL & SURGICAL HISTORY:  COPD (chronic obstructive pulmonary disease)  Localized enlarged lymph nodes  CHF (congestive heart failure): EF 40-45%  Subclavian vein stenosis, left: s/p stent  DKA, type 1: 1/2015  ACS (acute coronary syndrome): 1/2015 - cath revealed 100% ostial stenosis not amenable to PCI - medical management  TIA (transient ischemic attack): x 2 - 8-9 years ago prior to ASD/VSD repair  CAD (coronary artery disease): s/p stents  Gout: past  CVA (cerebral infarction): with no residual, 8 yrs ago, prior to heart surgery - ST memory loss  Peripheral vascular disease: occluded left fem-pop bypass 5/2015  Diabetes mellitus type 1: Insulin Dependent -  ESRD (end stage renal disease): dialysis  M, tue, thursday, saturday  Hyperlipidemia  Status post device closure of ASD: &quot;shantel&quot;  History of cardiac catheterization: 1/2015 - no intervention  S/P femoral-popliteal bypass surgery: L and R in 2013 with graft; 5/2015 CFA angioplasty left and ileofemoral endarterectomywith vein patch angioplasty of left fem-pop bypass graft  Multiple vascular surgery both leg, left fempop bypass revision 11/2015  AV (arteriovenous fistula): Left AV graft; revision with stent placement 2-3 years ago  S/P cholecystectomy    FAMILY HISTORY:  Family history of smoking  Family history of hypertension  Family history of cancer (Sibling)    PAST SOCIAL HISTORY:  past tobacco use     ALLERGIES & MEDICATIONS  --------------------------------------------------------------------------------  Allergies    No Known Allergies    Intolerances      Standing Inpatient Medications  aspirin enteric coated 81 milliGRAM(s) Oral daily  atorvastatin 20 milliGRAM(s) Oral at bedtime  ceFAZolin   IVPB 1000 milliGRAM(s) IV Intermittent every 24 hours  clopidogrel Tablet 75 milliGRAM(s) Oral daily  dextrose 5%. 1000 milliLiter(s) IV Continuous <Continuous>  dextrose 50% Injectable 12.5 Gram(s) IV Push once  dextrose 50% Injectable 25 Gram(s) IV Push once  dextrose 50% Injectable 25 Gram(s) IV Push once  heparin   Injectable 5000 Unit(s) SubCutaneous every 12 hours  insulin glargine Injectable (LANTUS) 13 Unit(s) SubCutaneous at bedtime  insulin lispro (HumaLOG) corrective regimen sliding scale   SubCutaneous three times a day before meals  insulin lispro (HumaLOG) corrective regimen sliding scale   SubCutaneous at bedtime  insulin lispro Injectable (HumaLOG) 2 Unit(s) SubCutaneous before breakfast  insulin lispro Injectable (HumaLOG) 2 Unit(s) SubCutaneous before lunch  insulin lispro Injectable (HumaLOG) 2 Unit(s) SubCutaneous before dinner  lisinopril 20 milliGRAM(s) Oral daily  metoprolol succinate ER 50 milliGRAM(s) Oral daily  sevelamer carbonate 2400 milliGRAM(s) Oral three times a day with meals    PRN Inpatient Medications  acetaminophen   Tablet .. 650 milliGRAM(s) Oral every 6 hours PRN  ALBUTerol    90 MICROgram(s) HFA Inhaler 2 Puff(s) Inhalation every 6 hours PRN  dextrose 40% Gel 15 Gram(s) Oral once PRN  glucagon  Injectable 1 milliGRAM(s) IntraMuscular once PRN  oxycodone    5 mG/acetaminophen 325 mG 1 Tablet(s) Oral every 6 hours PRN      REVIEW OF SYSTEMS  --------------------------------------------------------------------------------  Gen: No  fevers/chills   Head/Eyes/Ears/Mouth: No headache; Normal hearing;  No sinus pain/discomfort, sore throat  Respiratory: No dyspnea, cough, wheezing  CV: No chest pain, orthopnea  GI: No abdominal pain, diarrhea, nausea, vomiting  : No dysuria, + no uo no  hematuria  MSK: left leg swelling and pain   Neuro: No dizziness/lightheadedness,   also with +  edema     All other systems were reviewed and are negative, except as noted.    VITALS/PHYSICAL EXAM  --------------------------------------------------------------------------------  T(C): 37.3 (05-03-20 @ 17:03), Max: 37.3 (05-03-20 @ 17:03)  HR: 76 (05-03-20 @ 17:03) (70 - 93)  BP: 153/82 (05-03-20 @ 17:03) (136/50 - 153/82)  RR: 18 (05-03-20 @ 17:03) (18 - 20)  SpO2: 94% (05-03-20 @ 17:03) (94% - 98%)  Wt(kg): --        05-02-20 @ 07:01  -  05-03-20 @ 07:00  --------------------------------------------------------  IN: 410 mL / OUT: 0 mL / NET: 410 mL    05-03-20 @ 07:01  -  05-03-20 @ 21:14  --------------------------------------------------------  IN: 480 mL / OUT: 0 mL / NET: 480 mL      Physical Exam:  	Gen: Non toxic comfortable appearing   	no jvd   	Pulm: decrease bs  mild rales bases  	CV: RRR, S1S2; no rub  	Back: No CVA tenderness  	Abd: +BS, soft, nontender/nondistended  	: No suprapubic tenderness  	UE: Warm, no cyanosis  no clubbing,  no edema  	LE: Warm, no cyanosis  no clubbing, LLE  edema + erythema   	Neuro: alert and oriented. speech coherent   	Skin: Warm, no decrease skin turgor  LLE erythema       LABS/STUDIES  --------------------------------------------------------------------------------              9.4    4.20  >-----------<  269      [05-03-20 @ 17:46]              31.1     139  |  90  |  34  ----------------------------<  229      [05-03-20 @ 17:46]  5.5   |  26  |  6.69        Ca     9.1     [05-03-20 @ 17:46]      Mg     2.5     [05-02-20 @ 17:21]      Phos  6.5     [05-02-20 @ 17:21]    TPro  6.7  /  Alb  3.8  /  TBili  0.3  /  DBili  x   /  AST  14  /  ALT  7   /  AlkPhos  219  [05-03-20 @ 17:46]          Creatinine Trend:  SCr 6.69 [05-03 @ 17:46]  SCr 5.34 [05-02 @ 17:21]  SCr 3.90 [04-12 @ 07:14]  SCr 5.58 [04-11 @ 06:17]  SCr 4.49 [04-10 @ 06:38]    Urinalysis - [06-04-17 @ 08:24]      Color Yellow / Appearance Clear / SG 1.013 / pH 8.5      Gluc 1000 / Ketone Negative  / Bili Negative / Urobili Negative       Blood Negative / Protein >600 / Leuk Est Small / Nitrite Negative      RBC 3-5 / WBC 6-10 / Hyaline  / Gran  / Sq Epi  / Non Sq Epi OCC / Bacteria       Iron 67, TIBC 234, %sat 29      [12-04-19 @ 08:38]  Ferritin 5140      [04-11-20 @ 10:01]  PTH -- (Ca 8.3)      [12-04-19 @ 08:38]   952  PTH -- (Ca 9.6)      [06-17-19 @ 18:06]   177  HbA1c 7.7      [04-08-20 @ 08:03]  TSH 1.78      [11-14-19 @ 09:15]  Lipid: chol 108, TG 76, HDL 57, LDL 36      [11-14-19 @ 09:16]    HBsAb <3.0      [04-09-20 @ 01:04]  HBsAb Nonreact      [11-14-19 @ 04:43]  HBsAg Nonreact      [04-09-20 @ 01:04]  HBcAb Nonreact      [01-03-20 @ 03:47]  HCV 0.15, Nonreact      [11-14-19 @ 04:43]

## 2020-05-03 NOTE — CONSULT NOTE ADULT - ASSESSMENT
64 F PMH CAD Sp PTCA w/ stents, CHF, COPD, CVA, DMT1, ESRD on HD (M,Tu,Th,Sa), Gout, HLD, PVD, Subclavian Stenosis (L) s/p stent. PSH s/p ASD Repair, s/p idania, s/p fem-pop bypass, recurrent admission for hyper/hypoglycemia/DKA, p/w a 1 day history of worsening LLE swelling, erythema, and warmth.  recent admission april 12 dc after covid 19 + . over the past day, she noticed that her left leg has become   more swollen and red, states that it is diffusely painful with 10/10 intensity. Pt also has RLE erythema with mild tenderness, but less swelling.     1- ESRD  2- hyperkalemia  3- DM   4- CAD  5- SHPT  6- LE cellulitis       hd consent obtained witnessed and placed in chart   hd today   renvela one tab with meals   insulin to cont   ancef 1 g iv qd  dc midodrine   dc ace-i   re-evaluate bp and restart meds as needed

## 2020-05-03 NOTE — CONSULT NOTE ADULT - ASSESSMENT
full consult to follow 64y female with hx CAD Sp PTCA w/ stents, CHF, COPD, CVA, DMT, ESRD on HD, Gout, HLD, PVD, Subclavian Stenosis (L) s/p stent, s/p ASD Repair, s/p idania, s/p fem-pop bypass, recurrent admission for hyper/hypoglycemia/DKA, Pt now p/w worsening LLE swelling, erythema, and warmth for 1 day, diffusely painful. She denies any fevers or chills  suspect acute cellulitis LLE, most likely beta-hemolytic strep as possible likely pathogen    PLAN:  Ancef renally dosed  keep leg elevated  f/u cultures  if cont to improve switch to oral ceph renally dosed  d/w Dr Viera

## 2020-05-03 NOTE — CONSULT NOTE ADULT - SUBJECTIVE AND OBJECTIVE BOX
HPI:   Patient is a 64y female with    REVIEW OF SYSTEMS:  All other review of systems negative (Comprehensive ROS)    PAST MEDICAL & SURGICAL HISTORY:  COPD (chronic obstructive pulmonary disease)  Localized enlarged lymph nodes  CHF (congestive heart failure): EF 40-45%  Subclavian vein stenosis, left: s/p stent  DKA, type 1: 1/2015  ACS (acute coronary syndrome): 1/2015 - cath revealed 100% ostial stenosis not amenable to PCI - medical management  TIA (transient ischemic attack): x 2 - 8-9 years ago prior to ASD/VSD repair  CAD (coronary artery disease): s/p stents  Gout: past  CVA (cerebral infarction): with no residual, 8 yrs ago, prior to heart surgery - ST memory loss  Peripheral vascular disease: occluded left fem-pop bypass 5/2015  Diabetes mellitus type 1: Insulin Dependent -  ESRD (end stage renal disease): dialysis  M, tue, thursday, saturday  Hyperlipidemia  Status post device closure of ASD: &quot;clamshell&quot;  History of cardiac catheterization: 1/2015 - no intervention  S/P femoral-popliteal bypass surgery: L and R in 2013 with graft; 5/2015 CFA angioplasty left and ileofemoral endarterectomywith vein patch angioplasty of left fem-pop bypass graft  Multiple vascular surgery both leg, left fempop bypass revision 11/2015  AV (arteriovenous fistula): Left AV graft; revision with stent placement 2-3 years ago  S/P cholecystectomy      Allergies    No Known Allergies    Intolerances        Antimicrobials Day #    ceFAZolin   IVPB 1000 milliGRAM(s) IV Intermittent every 24 hours    Other Medications:  acetaminophen   Tablet .. 650 milliGRAM(s) Oral every 6 hours PRN  ALBUTerol    90 MICROgram(s) HFA Inhaler 2 Puff(s) Inhalation every 6 hours PRN  aspirin enteric coated 81 milliGRAM(s) Oral daily  atorvastatin 20 milliGRAM(s) Oral at bedtime  clopidogrel Tablet 75 milliGRAM(s) Oral daily  dextrose 40% Gel 15 Gram(s) Oral once PRN  dextrose 5%. 1000 milliLiter(s) IV Continuous <Continuous>  dextrose 50% Injectable 12.5 Gram(s) IV Push once  dextrose 50% Injectable 25 Gram(s) IV Push once  dextrose 50% Injectable 25 Gram(s) IV Push once  glucagon  Injectable 1 milliGRAM(s) IntraMuscular once PRN  heparin   Injectable 5000 Unit(s) SubCutaneous every 12 hours  insulin glargine Injectable (LANTUS) 13 Unit(s) SubCutaneous at bedtime  insulin lispro (HumaLOG) corrective regimen sliding scale   SubCutaneous three times a day before meals  insulin lispro (HumaLOG) corrective regimen sliding scale   SubCutaneous at bedtime  insulin lispro Injectable (HumaLOG) 2 Unit(s) SubCutaneous before breakfast  insulin lispro Injectable (HumaLOG) 2 Unit(s) SubCutaneous before lunch  insulin lispro Injectable (HumaLOG) 2 Unit(s) SubCutaneous before dinner  lisinopril 40 milliGRAM(s) Oral daily  metoprolol succinate ER 50 milliGRAM(s) Oral daily  midodrine. 10 milliGRAM(s) Oral three times a day  oxycodone    5 mG/acetaminophen 325 mG 1 Tablet(s) Oral every 6 hours PRN  sevelamer carbonate 2400 milliGRAM(s) Oral three times a day with meals      FAMILY HISTORY:  Family history of smoking  Family history of hypertension  Family history of cancer (Sibling)      SOCIAL HISTORY:  Smoking:     ETOH:     Drug Use:     Single     T(F): 98.6 (05-03-20 @ 08:36), Max: 98.6 (05-03-20 @ 08:36)  HR: 93 (05-03-20 @ 08:36)  BP: 142/76 (05-03-20 @ 08:36)  RR: 20 (05-03-20 @ 08:36)  SpO2: 96% (05-03-20 @ 08:36)  Wt(kg): --    PHYSICAL EXAM:  General: alert, no acute distress  Eyes:  anicteric, no conjunctival injection, no discharge  Oropharynx: no lesions or injection 	  Neck: supple, without adenopathy  Lungs: clear to auscultation  Heart: regular rate and rhythm; no murmur, rubs or gallops  Abdomen: soft, nondistended, nontender, without mass or organomegaly  Skin: no lesions  Extremities: no clubbing, cyanosis, or edema  Neurologic: alert, oriented, moves all extremities    LAB RESULTS:                        9.8    4.92  )-----------( 249      ( 02 May 2020 17:21 )             31.8     05-02    138  |  92<L>  |  29<H>  ----------------------------<  364<H>  5.3   |  24  |  5.34<H>    Ca    9.5      02 May 2020 17:21  Phos  6.5     05-02  Mg     2.5     05-02    TPro  6.9  /  Alb  3.9  /  TBili  0.4  /  DBili  x   /  AST  14  /  ALT  10  /  AlkPhos  246<H>  05-02    LIVER FUNCTIONS - ( 02 May 2020 17:21 )  Alb: 3.9 g/dL / Pro: 6.9 g/dL / ALK PHOS: 246 U/L / ALT: 10 U/L / AST: 14 U/L / GGT: x               MICROBIOLOGY REVIEWED:    RADIOLOGY REVIEWED:  < from: [facility] Duplex Lower Ext Vein Scan, Bilat (05.02.20 @ 18:31) >  IMPRESSION:     No evidence of deep venous thrombosis in either lower extremity.    < end of copied text > HPI: 64y female with hx CAD Sp PTCA w/ stents, CHF, COPD, CVA, DMT, ESRD on HD, Gout, HLD, PVD, Subclavian Stenosis (L) s/p stent, s/p ASD Repair, s/p idania, s/p fem-pop bypass, recurrent admission for hyper/hypoglycemia/DKA, Pt now p/w worsening LLE swelling, erythema, and warmth for 1 day, diffusely painful. She denies any fevers or chills    REVIEW OF SYSTEMS:  All other review of systems negative (Comprehensive ROS)    PAST MEDICAL & SURGICAL HISTORY:  COPD (chronic obstructive pulmonary disease)  Localized enlarged lymph nodes  CHF (congestive heart failure): EF 40-45%  Subclavian vein stenosis, left: s/p stent  DKA, type 1: 1/2015  ACS (acute coronary syndrome): 1/2015 - cath revealed 100% ostial stenosis not amenable to PCI - medical management  TIA (transient ischemic attack): x 2 - 8-9 years ago prior to ASD/VSD repair  CAD (coronary artery disease): s/p stents  Gout: past  CVA (cerebral infarction): with no residual, 8 yrs ago, prior to heart surgery - ST memory loss  Peripheral vascular disease: occluded left fem-pop bypass 5/2015  Diabetes mellitus type 1: Insulin Dependent -  ESRD (end stage renal disease): dialysis  M, tue, thursday, saturday  Hyperlipidemia  Status post device closure of ASD: &quot;clamshell&quot;  History of cardiac catheterization: 1/2015 - no intervention  S/P femoral-popliteal bypass surgery: L and R in 2013 with graft; 5/2015 CFA angioplasty left and ileofemoral endarterectomywith vein patch angioplasty of left fem-pop bypass graft  Multiple vascular surgery both leg, left fempop bypass revision 11/2015  AV (arteriovenous fistula): Left AV graft; revision with stent placement 2-3 years ago  S/P cholecystectomy      Allergies    No Known Allergies    Intolerances        Antimicrobials Day #    ceFAZolin   IVPB 1000 milliGRAM(s) IV Intermittent every 24 hours    Other Medications:  acetaminophen   Tablet .. 650 milliGRAM(s) Oral every 6 hours PRN  ALBUTerol    90 MICROgram(s) HFA Inhaler 2 Puff(s) Inhalation every 6 hours PRN  aspirin enteric coated 81 milliGRAM(s) Oral daily  atorvastatin 20 milliGRAM(s) Oral at bedtime  clopidogrel Tablet 75 milliGRAM(s) Oral daily  dextrose 40% Gel 15 Gram(s) Oral once PRN  dextrose 5%. 1000 milliLiter(s) IV Continuous <Continuous>  dextrose 50% Injectable 12.5 Gram(s) IV Push once  dextrose 50% Injectable 25 Gram(s) IV Push once  dextrose 50% Injectable 25 Gram(s) IV Push once  glucagon  Injectable 1 milliGRAM(s) IntraMuscular once PRN  heparin   Injectable 5000 Unit(s) SubCutaneous every 12 hours  insulin glargine Injectable (LANTUS) 13 Unit(s) SubCutaneous at bedtime  insulin lispro (HumaLOG) corrective regimen sliding scale   SubCutaneous three times a day before meals  insulin lispro (HumaLOG) corrective regimen sliding scale   SubCutaneous at bedtime  insulin lispro Injectable (HumaLOG) 2 Unit(s) SubCutaneous before breakfast  insulin lispro Injectable (HumaLOG) 2 Unit(s) SubCutaneous before lunch  insulin lispro Injectable (HumaLOG) 2 Unit(s) SubCutaneous before dinner  lisinopril 40 milliGRAM(s) Oral daily  metoprolol succinate ER 50 milliGRAM(s) Oral daily  midodrine. 10 milliGRAM(s) Oral three times a day  oxycodone    5 mG/acetaminophen 325 mG 1 Tablet(s) Oral every 6 hours PRN  sevelamer carbonate 2400 milliGRAM(s) Oral three times a day with meals      FAMILY HISTORY:  Family history of smoking  Family history of hypertension  Family history of cancer (Sibling)      SOCIAL HISTORY:  Smoking:     ETOH:     Drug Use:     Single     T(F): 98.6 (05-03-20 @ 08:36), Max: 98.6 (05-03-20 @ 08:36)  HR: 93 (05-03-20 @ 08:36)  BP: 142/76 (05-03-20 @ 08:36)  RR: 20 (05-03-20 @ 08:36)  SpO2: 96% (05-03-20 @ 08:36)  Wt(kg): --    PHYSICAL EXAM:  General: alert, no acute distress  Eyes:  anicteric, no conjunctival injection, no discharge  Oropharynx: no lesions or injection 	  Neck: supple, without adenopathy  Lungs: clear to auscultation  Heart: regular rate and rhythm; no murmur, rubs or gallops  Abdomen: soft, nondistended, nontender, without mass or organomegaly  Skin: no lesions  Extremities: LLE edematous with warmth and redness, tender to light touch  Neurologic: alert, oriented, moves all extremities    LAB RESULTS:                        9.8    4.92  )-----------( 249      ( 02 May 2020 17:21 )             31.8     05-02    138  |  92<L>  |  29<H>  ----------------------------<  364<H>  5.3   |  24  |  5.34<H>    Ca    9.5      02 May 2020 17:21  Phos  6.5     05-02  Mg     2.5     05-02    TPro  6.9  /  Alb  3.9  /  TBili  0.4  /  DBili  x   /  AST  14  /  ALT  10  /  AlkPhos  246<H>  05-02    LIVER FUNCTIONS - ( 02 May 2020 17:21 )  Alb: 3.9 g/dL / Pro: 6.9 g/dL / ALK PHOS: 246 U/L / ALT: 10 U/L / AST: 14 U/L / GGT: x               MICROBIOLOGY REVIEWED:    RADIOLOGY REVIEWED:  < from: [facility] Duplex Lower Ext Vein Scan, Bilat (05.02.20 @ 18:31) >  IMPRESSION:     No evidence of deep venous thrombosis in either lower extremity.    < end of copied text >

## 2020-05-04 DIAGNOSIS — E10.29 TYPE 1 DIABETES MELLITUS WITH OTHER DIABETIC KIDNEY COMPLICATION: ICD-10-CM

## 2020-05-04 DIAGNOSIS — I10 ESSENTIAL (PRIMARY) HYPERTENSION: ICD-10-CM

## 2020-05-04 DIAGNOSIS — E78.2 MIXED HYPERLIPIDEMIA: ICD-10-CM

## 2020-05-04 LAB
ALBUMIN SERPL ELPH-MCNC: 4.1 G/DL — SIGNIFICANT CHANGE UP (ref 3.3–5)
ALP SERPL-CCNC: 225 U/L — HIGH (ref 40–120)
ALT FLD-CCNC: 5 U/L — LOW (ref 10–45)
ANION GAP SERPL CALC-SCNC: 20 MMOL/L — HIGH (ref 5–17)
AST SERPL-CCNC: 15 U/L — SIGNIFICANT CHANGE UP (ref 10–40)
BILIRUB SERPL-MCNC: 0.3 MG/DL — SIGNIFICANT CHANGE UP (ref 0.2–1.2)
BUN SERPL-MCNC: 17 MG/DL — SIGNIFICANT CHANGE UP (ref 7–23)
CALCIUM SERPL-MCNC: 8.5 MG/DL — SIGNIFICANT CHANGE UP (ref 8.4–10.5)
CALCIUM SERPL-MCNC: 9.5 MG/DL — SIGNIFICANT CHANGE UP (ref 8.4–10.5)
CHLORIDE SERPL-SCNC: 92 MMOL/L — LOW (ref 96–108)
CO2 SERPL-SCNC: 25 MMOL/L — SIGNIFICANT CHANGE UP (ref 22–31)
CREAT SERPL-MCNC: 4.51 MG/DL — HIGH (ref 0.5–1.3)
GLUCOSE BLDC GLUCOMTR-MCNC: 102 MG/DL — HIGH (ref 70–99)
GLUCOSE BLDC GLUCOMTR-MCNC: 143 MG/DL — HIGH (ref 70–99)
GLUCOSE BLDC GLUCOMTR-MCNC: 214 MG/DL — HIGH (ref 70–99)
GLUCOSE BLDC GLUCOMTR-MCNC: 246 MG/DL — HIGH (ref 70–99)
GLUCOSE BLDC GLUCOMTR-MCNC: 250 MG/DL — HIGH (ref 70–99)
GLUCOSE BLDC GLUCOMTR-MCNC: 250 MG/DL — HIGH (ref 70–99)
GLUCOSE BLDC GLUCOMTR-MCNC: 83 MG/DL — SIGNIFICANT CHANGE UP (ref 70–99)
GLUCOSE BLDC GLUCOMTR-MCNC: 85 MG/DL — SIGNIFICANT CHANGE UP (ref 70–99)
GLUCOSE SERPL-MCNC: 257 MG/DL — HIGH (ref 70–99)
HBV CORE AB SER-ACNC: SIGNIFICANT CHANGE UP
HBV SURFACE AB SER-ACNC: <3 MIU/ML — LOW
HBV SURFACE AB SER-ACNC: SIGNIFICANT CHANGE UP
HBV SURFACE AG SER-ACNC: SIGNIFICANT CHANGE UP
HCT VFR BLD CALC: 31.2 % — LOW (ref 34.5–45)
HCV AB S/CO SERPL IA: 0.18 S/CO — SIGNIFICANT CHANGE UP (ref 0–0.99)
HCV AB SERPL-IMP: SIGNIFICANT CHANGE UP
HGB BLD-MCNC: 9.5 G/DL — LOW (ref 11.5–15.5)
MCHC RBC-ENTMCNC: 30.4 GM/DL — LOW (ref 32–36)
MCHC RBC-ENTMCNC: 34.2 PG — HIGH (ref 27–34)
MCV RBC AUTO: 112.2 FL — HIGH (ref 80–100)
NRBC # BLD: 0 /100 WBCS — SIGNIFICANT CHANGE UP (ref 0–0)
PLATELET # BLD AUTO: 237 K/UL — SIGNIFICANT CHANGE UP (ref 150–400)
POTASSIUM SERPL-MCNC: 4.5 MMOL/L — SIGNIFICANT CHANGE UP (ref 3.5–5.3)
POTASSIUM SERPL-SCNC: 4.5 MMOL/L — SIGNIFICANT CHANGE UP (ref 3.5–5.3)
PROT SERPL-MCNC: 7 G/DL — SIGNIFICANT CHANGE UP (ref 6–8.3)
PTH-INTACT FLD-MCNC: 1317 PG/ML — HIGH (ref 15–65)
RBC # BLD: 2.78 M/UL — LOW (ref 3.8–5.2)
RBC # FLD: 18.1 % — HIGH (ref 10.3–14.5)
SODIUM SERPL-SCNC: 137 MMOL/L — SIGNIFICANT CHANGE UP (ref 135–145)
WBC # BLD: 3.14 K/UL — LOW (ref 3.8–10.5)
WBC # FLD AUTO: 3.14 K/UL — LOW (ref 3.8–10.5)

## 2020-05-04 PROCEDURE — 99221 1ST HOSP IP/OBS SF/LOW 40: CPT

## 2020-05-04 RX ORDER — INSULIN LISPRO 100/ML
VIAL (ML) SUBCUTANEOUS
Refills: 0 | Status: DISCONTINUED | OUTPATIENT
Start: 2020-05-04 | End: 2020-05-07

## 2020-05-04 RX ORDER — INSULIN LISPRO 100/ML
3 VIAL (ML) SUBCUTANEOUS
Refills: 0 | Status: DISCONTINUED | OUTPATIENT
Start: 2020-05-04 | End: 2020-05-05

## 2020-05-04 RX ORDER — INSULIN GLARGINE 100 [IU]/ML
14 INJECTION, SOLUTION SUBCUTANEOUS AT BEDTIME
Refills: 0 | Status: DISCONTINUED | OUTPATIENT
Start: 2020-05-04 | End: 2020-05-06

## 2020-05-04 RX ADMIN — INSULIN GLARGINE 14 UNIT(S): 100 INJECTION, SOLUTION SUBCUTANEOUS at 22:36

## 2020-05-04 RX ADMIN — SEVELAMER CARBONATE 2400 MILLIGRAM(S): 2400 POWDER, FOR SUSPENSION ORAL at 08:34

## 2020-05-04 RX ADMIN — LISINOPRIL 20 MILLIGRAM(S): 2.5 TABLET ORAL at 05:24

## 2020-05-04 RX ADMIN — OXYCODONE AND ACETAMINOPHEN 1 TABLET(S): 5; 325 TABLET ORAL at 15:34

## 2020-05-04 RX ADMIN — Medication 50 MILLIGRAM(S): at 05:24

## 2020-05-04 RX ADMIN — HEPARIN SODIUM 5000 UNIT(S): 5000 INJECTION INTRAVENOUS; SUBCUTANEOUS at 16:45

## 2020-05-04 RX ADMIN — OXYCODONE AND ACETAMINOPHEN 1 TABLET(S): 5; 325 TABLET ORAL at 22:17

## 2020-05-04 RX ADMIN — OXYCODONE AND ACETAMINOPHEN 1 TABLET(S): 5; 325 TABLET ORAL at 01:25

## 2020-05-04 RX ADMIN — Medication 2 UNIT(S): at 08:35

## 2020-05-04 RX ADMIN — Medication 81 MILLIGRAM(S): at 12:15

## 2020-05-04 RX ADMIN — SEVELAMER CARBONATE 2400 MILLIGRAM(S): 2400 POWDER, FOR SUSPENSION ORAL at 16:45

## 2020-05-04 RX ADMIN — CLOPIDOGREL BISULFATE 75 MILLIGRAM(S): 75 TABLET, FILM COATED ORAL at 12:15

## 2020-05-04 RX ADMIN — HEPARIN SODIUM 5000 UNIT(S): 5000 INJECTION INTRAVENOUS; SUBCUTANEOUS at 05:26

## 2020-05-04 RX ADMIN — SEVELAMER CARBONATE 2400 MILLIGRAM(S): 2400 POWDER, FOR SUSPENSION ORAL at 12:14

## 2020-05-04 RX ADMIN — Medication 2: at 17:46

## 2020-05-04 RX ADMIN — Medication 2 UNIT(S): at 17:47

## 2020-05-04 RX ADMIN — Medication 2: at 12:15

## 2020-05-04 RX ADMIN — ATORVASTATIN CALCIUM 20 MILLIGRAM(S): 80 TABLET, FILM COATED ORAL at 22:17

## 2020-05-04 RX ADMIN — Medication 2: at 08:34

## 2020-05-04 RX ADMIN — Medication 100 MILLIGRAM(S): at 22:17

## 2020-05-04 RX ADMIN — Medication 2 UNIT(S): at 12:15

## 2020-05-04 NOTE — CONSULT NOTE ADULT - ASSESSMENT
64 female with T1DM uncontrolled x 44 years with PVD s/p b/l fem-pop, CVA/TIA, CAD with MIx8 s/p PCI with stenting, and ESRD on HD here with 2 days of L leg redness with swelling, endocrine consulted for T1D mgt.

## 2020-05-04 NOTE — CHART NOTE - NSCHARTNOTEFT_GEN_A_CORE
PA Note      As per Nephrology recommendation, Lisinopril discontinued.  Dr. Viera aware.  Will monitor BP.        KALI Tuttle  Orthopedic Surgery  [phone]

## 2020-05-04 NOTE — PROVIDER CONTACT NOTE (OTHER) - ASSESSMENT
pt c/o 10/10 pain in LLE. Leg is red, swollen and slightly warm to the touch. percocet given w/ some relief.

## 2020-05-04 NOTE — CONSULT NOTE ADULT - SUBJECTIVE AND OBJECTIVE BOX
HPI: 64 female with T1DM uncontrolled x 44 years with PVD s/p b/l fem-pop, CVA/TIA, CAD with MIx8 s/p PCI with stenting, and ESRD on HD here with 2 days of L leg redness with swelling. She denies falling. There is associated with sharp, throbbing "11/10 pain" which is better with Oxycontin or moving and worse with tylenol. She is also having decreased po intake and appetite but she "would love a pizza pie". Pt in fact asked if I could come get her credit card and place her an order but I declined.   At home she takes Basaglar 8 units sq qhs and Humalog 3 units sq tid-ac, but during her admission last month for COVID she was discharged on: Basaglar 12 units sq qhs and Humalog: bs : 3 units, above 200: 4 units. Dr. Ley, is her outpatient endocrine in [place].  COVID in the entire family: , son, daughter, and granddaughter. Her grandson's father had it first.     PAST MEDICAL & SURGICAL HISTORY:  COPD (chronic obstructive pulmonary disease)  Localized enlarged lymph nodes  CHF (congestive heart failure): EF 40-45%  Subclavian vein stenosis, left: s/p stent  DKA, type 1: 1/2015  ACS (acute coronary syndrome): 1/2015 - cath revealed 100% ostial stenosis not amenable to PCI - medical management  TIA (transient ischemic attack): x 2 - 8-9 years ago prior to ASD/VSD repair  CAD (coronary artery disease): s/p stents  Gout: past  CVA (cerebral infarction): with no residual, 8 yrs ago, prior to heart surgery - ST memory loss  Peripheral vascular disease: occluded left fem-pop bypass 5/2015  Diabetes mellitus type 1: Insulin Dependent -  ESRD (end stage renal disease): dialysis  M, tue, thursday, saturday  Hyperlipidemia  Status post device closure of ASD  History of cardiac catheterization: 1/2015 - no intervention  S/P femoral-popliteal bypass surgery: L and R in 2013 with graft; 5/2015 CFA angioplasty left and ileofemoral endarterectomywith vein patch angioplasty of left fem-pop bypass graft  Multiple vascular surgery both leg, left fempop bypass revision 11/2015  AV (arteriovenous fistula): Left AV graft; revision with stent placement 2-3 years ago  S/P cholecystectomy      FAMILY HISTORY:  DM: nephew  Hearing loss: kids    Social History:  Tobacco: quit 35 years ago, smoked 1ppd x 20 years  Lives with  and son    Outpatient Medications: Basaglar 8 units sq qhs and Humalog 3 units sq tid-ac    MEDICATIONS  (STANDING):  aspirin enteric coated 81 milliGRAM(s) Oral daily  atorvastatin 20 milliGRAM(s) Oral at bedtime  ceFAZolin   IVPB 1000 milliGRAM(s) IV Intermittent every 24 hours  clopidogrel Tablet 75 milliGRAM(s) Oral daily  dextrose 5%. 1000 milliLiter(s) (50 mL/Hr) IV Continuous <Continuous>  dextrose 50% Injectable 12.5 Gram(s) IV Push once  dextrose 50% Injectable 25 Gram(s) IV Push once  dextrose 50% Injectable 25 Gram(s) IV Push once  heparin   Injectable 5000 Unit(s) SubCutaneous every 12 hours  insulin glargine Injectable (LANTUS) 13 Unit(s) SubCutaneous at bedtime  insulin lispro (HumaLOG) corrective regimen sliding scale   SubCutaneous three times a day before meals  insulin lispro (HumaLOG) corrective regimen sliding scale   SubCutaneous at bedtime  insulin lispro Injectable (HumaLOG) 2 Unit(s) SubCutaneous before breakfast  insulin lispro Injectable (HumaLOG) 2 Unit(s) SubCutaneous before lunch  insulin lispro Injectable (HumaLOG) 2 Unit(s) SubCutaneous before dinner  metoprolol succinate ER 50 milliGRAM(s) Oral daily  sevelamer carbonate 2400 milliGRAM(s) Oral three times a day with meals    MEDICATIONS  (PRN):  acetaminophen   Tablet .. 650 milliGRAM(s) Oral every 6 hours PRN Temp greater or equal to 38.5C (101.3F), Mild Pain (1 - 3)  ALBUTerol    90 MICROgram(s) HFA Inhaler 2 Puff(s) Inhalation every 6 hours PRN Shortness of Breath and/or Wheezing  dextrose 40% Gel 15 Gram(s) Oral once PRN Blood Glucose LESS THAN 70 milliGRAM(s)/deciliter  glucagon  Injectable 1 milliGRAM(s) IntraMuscular once PRN Glucose LESS THAN 70 milligrams/deciliter  oxycodone    5 mG/acetaminophen 325 mG 1 Tablet(s) Oral every 6 hours PRN Moderate Pain (4 - 6)      Allergies  No Known Allergies      Review of Systems:  Constitutional: +decreased appetite/po intake  Eyes: No blurry vision, diplopia  Neuro: No tremors, HA  Cardiovascular: No chest pain, palpitations  Respiratory: No SOB, no cough  GI: No nausea, vomiting,   : +anuric, no vaginal bleeding  MS: +L leg pain, no problem with gait  Hem/lymph: +swelling/erythema of L leg   ALL OTHER SYSTEMS REVIEWED AND NEGATIVE        PHYSICAL EXAM:  VITALS: T(C): 36.7 (05-04-20 @ 17:21)  T(F): 98.1 (05-04-20 @ 17:21), Max: 98.6 (05-04-20 @ 01:19)  HR: 78 (05-04-20 @ 17:21) (69 - 79)  BP: 158/68 (05-04-20 @ 18:12) (120/72 - 162/77)  RR:  (17 - 18)  SpO2:  (93% - 95%)  Wt(kg): --  Due to [facility] policy during the evolving novel coronavirus outbreak, efforts are being made to limit unnecessary patient contacts to limit the spread of disease. Accordingly, patients without clear indication for physical exam or face to face interview from the Endocrine team will be adjusted in conjunction with conversations with primary provider team. This is being done for the safety of all the patients we care for.     POCT Blood Glucose.: 250 mg/dL (05-04-20 @ 17:23)  POCT Blood Glucose.: 214 mg/dL (05-04-20 @ 11:58)  POCT Blood Glucose.: 250 mg/dL (05-04-20 @ 07:58)  POCT Blood Glucose.: 143 mg/dL (05-04-20 @ 01:16)  POCT Blood Glucose.: 260 mg/dL (05-03-20 @ 21:01)  POCT Blood Glucose.: 237 mg/dL (05-03-20 @ 17:09)  POCT Blood Glucose.: 134 mg/dL (05-03-20 @ 12:49)  POCT Blood Glucose.: 75 mg/dL (05-03-20 @ 12:22)  POCT Blood Glucose.: 55 mg/dL (05-03-20 @ 11:59)  POCT Blood Glucose.: 61 mg/dL (05-03-20 @ 11:58)  POCT Blood Glucose.: 99 mg/dL (05-03-20 @ 07:57)  POCT Blood Glucose.: 180 mg/dL (05-02-20 @ 22:40)  POCT Blood Glucose.: 246 mg/dL (05-02-20 @ 21:16)  POCT Blood Glucose.: 313 mg/dL (05-02-20 @ 20:02)  POCT Blood Glucose.: 386 mg/dL (05-02-20 @ 18:58)  POCT Blood Glucose.: 339 mg/dL (05-02-20 @ 17:18)                            9.5    3.14  )-----------( 237      ( 04 May 2020 09:58 )             31.2       05-04    137  |  92<L>  |  17  ----------------------------<  257<H>  4.5   |  25  |  4.51<H>    EGFR if : 11<L>  EGFR if non : 10<L>    Ca    9.5      05-04  Mg     2.5     05-02  Phos  6.5     05-02    TPro  7.0  /  Alb  4.1  /  TBili  0.3  /  DBili  x   /  AST  15  /  ALT  5<L>  /  AlkPhos  225<H>  05-04

## 2020-05-04 NOTE — CONSULT NOTE ADULT - PROBLEM SELECTOR RECOMMENDATION 3
-atorvastatin 20mg po qhs  -discussed with Dr. Viera, spent 30 minutes coordinating her care.   Paris Colbert MD  Pager: [phone]  Nights and Weekends: [phone]

## 2020-05-05 LAB
GLUCOSE BLDC GLUCOMTR-MCNC: 133 MG/DL — HIGH (ref 70–99)
GLUCOSE BLDC GLUCOMTR-MCNC: 154 MG/DL — HIGH (ref 70–99)
GLUCOSE BLDC GLUCOMTR-MCNC: 176 MG/DL — HIGH (ref 70–99)
GLUCOSE BLDC GLUCOMTR-MCNC: 90 MG/DL — SIGNIFICANT CHANGE UP (ref 70–99)
GLUCOSE BLDC GLUCOMTR-MCNC: 97 MG/DL — SIGNIFICANT CHANGE UP (ref 70–99)

## 2020-05-05 PROCEDURE — 99231 SBSQ HOSP IP/OBS SF/LOW 25: CPT

## 2020-05-05 RX ORDER — INSULIN LISPRO 100/ML
2 VIAL (ML) SUBCUTANEOUS
Refills: 0 | Status: DISCONTINUED | OUTPATIENT
Start: 2020-05-06 | End: 2020-05-07

## 2020-05-05 RX ORDER — INSULIN LISPRO 100/ML
2 VIAL (ML) SUBCUTANEOUS
Refills: 0 | Status: DISCONTINUED | OUTPATIENT
Start: 2020-05-05 | End: 2020-05-07

## 2020-05-05 RX ADMIN — Medication 81 MILLIGRAM(S): at 08:45

## 2020-05-05 RX ADMIN — ATORVASTATIN CALCIUM 20 MILLIGRAM(S): 80 TABLET, FILM COATED ORAL at 20:18

## 2020-05-05 RX ADMIN — Medication 100 MILLIGRAM(S): at 20:18

## 2020-05-05 RX ADMIN — Medication 3 UNIT(S): at 08:46

## 2020-05-05 RX ADMIN — CLOPIDOGREL BISULFATE 75 MILLIGRAM(S): 75 TABLET, FILM COATED ORAL at 08:46

## 2020-05-05 RX ADMIN — SEVELAMER CARBONATE 2400 MILLIGRAM(S): 2400 POWDER, FOR SUSPENSION ORAL at 12:08

## 2020-05-05 RX ADMIN — Medication 1: at 08:45

## 2020-05-05 RX ADMIN — SEVELAMER CARBONATE 2400 MILLIGRAM(S): 2400 POWDER, FOR SUSPENSION ORAL at 21:50

## 2020-05-05 RX ADMIN — HEPARIN SODIUM 5000 UNIT(S): 5000 INJECTION INTRAVENOUS; SUBCUTANEOUS at 06:18

## 2020-05-05 RX ADMIN — HEPARIN SODIUM 5000 UNIT(S): 5000 INJECTION INTRAVENOUS; SUBCUTANEOUS at 20:18

## 2020-05-05 RX ADMIN — SEVELAMER CARBONATE 2400 MILLIGRAM(S): 2400 POWDER, FOR SUSPENSION ORAL at 08:46

## 2020-05-05 RX ADMIN — Medication 50 MILLIGRAM(S): at 08:46

## 2020-05-05 RX ADMIN — OXYCODONE AND ACETAMINOPHEN 1 TABLET(S): 5; 325 TABLET ORAL at 15:41

## 2020-05-05 RX ADMIN — Medication 3 UNIT(S): at 12:08

## 2020-05-05 RX ADMIN — INSULIN GLARGINE 14 UNIT(S): 100 INJECTION, SOLUTION SUBCUTANEOUS at 21:59

## 2020-05-05 NOTE — PROGRESS NOTE ADULT - PROBLEM SELECTOR PLAN 2
-c/w atorvastatin 20mg po qhs    call w/any questions  pager: [phone]   cell: [phone]  office; [phone]    -I spent a total time of 15 minutes reviewing chart notes/BG/lab values and coordinating care for this patient.     Due to [facility] policy during the evolving novel coronavirus outbreak, efforts are being made to limit unnecessary patient contacts to limit the spread of disease.   Accordingly, patients without clear indication for physical exam or face to face interview from the Endocrine team will be adjusted in conjunction with conversations with primary provider team. This is being done for the safety of all the patients we care for.

## 2020-05-05 NOTE — PROGRESS NOTE ADULT - PROBLEM SELECTOR PLAN 1
-test BG AC/HS/2AM  -c/w Lantus 14 units QHS  -Decrease Humalog 2 units w/meals for now  -c/w Humalog low correction scale AC and Low HS/2AM scale  -renal/diabetic diet with qhs snack  DISPO: basal/bolus with doses TBD  f/u with Dr. Ley, endocrine.

## 2020-05-05 NOTE — PROVIDER CONTACT NOTE (OTHER) - SITUATION
RN noticed packing on pt's Rt nostril. Pt states that "nose bleeding had been a few hours." RN gave sterile gauze pads as for packing. Advised pt not to irritate nose and utilized gauze as packing.

## 2020-05-05 NOTE — PHYSICAL THERAPY INITIAL EVALUATION ADULT - ADDITIONAL COMMENTS
Pt lives with family in private home +steps to second floor bedroom. Previously independent with all functional mobility, uses SC in community.

## 2020-05-05 NOTE — PHYSICAL THERAPY INITIAL EVALUATION ADULT - PERTINENT HX OF CURRENT PROBLEM, REHAB EVAL
64 F PMH ESRD on HD (M,Tu,Th,Sa) recurrent admission for hyper/hypoglycemia/DKA, p/w a 1 day history of worsening LLE swelling, erythema, and warmth. Pt states that she has b/L leg swelling at baseline, but over the past day, she noticed that her L leg has become more swollen and red, states that it is diffusely painful with 10/10 intensity. Pt also has RLE erythema with mild tenderness, but less swelling. VADuplex (-) COVID +

## 2020-05-06 ENCOUNTER — TRANSCRIPTION ENCOUNTER (OUTPATIENT)
Age: 65
End: 2020-05-06

## 2020-05-06 LAB
ALBUMIN SERPL ELPH-MCNC: 3.7 G/DL — SIGNIFICANT CHANGE UP (ref 3.3–5)
ALP SERPL-CCNC: 191 U/L — HIGH (ref 40–120)
ALT FLD-CCNC: <5 U/L — LOW (ref 10–45)
ANION GAP SERPL CALC-SCNC: 17 MMOL/L — SIGNIFICANT CHANGE UP (ref 5–17)
AST SERPL-CCNC: 15 U/L — SIGNIFICANT CHANGE UP (ref 10–40)
BASOPHILS # BLD AUTO: 0 K/UL — SIGNIFICANT CHANGE UP (ref 0–0.2)
BASOPHILS NFR BLD AUTO: 0 % — SIGNIFICANT CHANGE UP (ref 0–2)
BILIRUB SERPL-MCNC: 0.2 MG/DL — SIGNIFICANT CHANGE UP (ref 0.2–1.2)
BUN SERPL-MCNC: 15 MG/DL — SIGNIFICANT CHANGE UP (ref 7–23)
CALCIUM SERPL-MCNC: 9.5 MG/DL — SIGNIFICANT CHANGE UP (ref 8.4–10.5)
CHLORIDE SERPL-SCNC: 92 MMOL/L — LOW (ref 96–108)
CK SERPL-CCNC: 162 U/L — SIGNIFICANT CHANGE UP (ref 25–170)
CO2 SERPL-SCNC: 28 MMOL/L — SIGNIFICANT CHANGE UP (ref 22–31)
CREAT SERPL-MCNC: 5.02 MG/DL — HIGH (ref 0.5–1.3)
D DIMER BLD IA.RAPID-MCNC: 672 NG/ML DDU — HIGH
EOSINOPHIL # BLD AUTO: 0.2 K/UL — SIGNIFICANT CHANGE UP (ref 0–0.5)
EOSINOPHIL NFR BLD AUTO: 6.2 % — HIGH (ref 0–6)
FERRITIN SERPL-MCNC: 1387 NG/ML — HIGH (ref 15–150)
GLUCOSE BLDC GLUCOMTR-MCNC: 121 MG/DL — HIGH (ref 70–99)
GLUCOSE BLDC GLUCOMTR-MCNC: 146 MG/DL — HIGH (ref 70–99)
GLUCOSE BLDC GLUCOMTR-MCNC: 201 MG/DL — HIGH (ref 70–99)
GLUCOSE BLDC GLUCOMTR-MCNC: 49 MG/DL — LOW (ref 70–99)
GLUCOSE BLDC GLUCOMTR-MCNC: 50 MG/DL — LOW (ref 70–99)
GLUCOSE BLDC GLUCOMTR-MCNC: 92 MG/DL — SIGNIFICANT CHANGE UP (ref 70–99)
GLUCOSE BLDC GLUCOMTR-MCNC: 94 MG/DL — SIGNIFICANT CHANGE UP (ref 70–99)
GLUCOSE SERPL-MCNC: 127 MG/DL — HIGH (ref 70–99)
HCT VFR BLD CALC: 31.6 % — LOW (ref 34.5–45)
HGB BLD-MCNC: 9.3 G/DL — LOW (ref 11.5–15.5)
LDH SERPL L TO P-CCNC: 227 U/L — SIGNIFICANT CHANGE UP (ref 50–242)
LYMPHOCYTES # BLD AUTO: 0.5 K/UL — LOW (ref 1–3.3)
LYMPHOCYTES # BLD AUTO: 15.1 % — SIGNIFICANT CHANGE UP (ref 13–44)
MAGNESIUM SERPL-MCNC: 2.2 MG/DL — SIGNIFICANT CHANGE UP (ref 1.6–2.6)
MCHC RBC-ENTMCNC: 29.4 GM/DL — LOW (ref 32–36)
MCHC RBC-ENTMCNC: 33.2 PG — SIGNIFICANT CHANGE UP (ref 27–34)
MCV RBC AUTO: 112.9 FL — HIGH (ref 80–100)
MONOCYTES # BLD AUTO: 0.17 K/UL — SIGNIFICANT CHANGE UP (ref 0–0.9)
MONOCYTES NFR BLD AUTO: 5.3 % — SIGNIFICANT CHANGE UP (ref 2–14)
NEUTROPHILS # BLD AUTO: 2.28 K/UL — SIGNIFICANT CHANGE UP (ref 1.8–7.4)
NEUTROPHILS NFR BLD AUTO: 69 % — SIGNIFICANT CHANGE UP (ref 43–77)
PHOSPHATE SERPL-MCNC: 4.5 MG/DL — SIGNIFICANT CHANGE UP (ref 2.5–4.5)
PLATELET # BLD AUTO: 204 K/UL — SIGNIFICANT CHANGE UP (ref 150–400)
POTASSIUM SERPL-MCNC: 4 MMOL/L — SIGNIFICANT CHANGE UP (ref 3.5–5.3)
POTASSIUM SERPL-SCNC: 4 MMOL/L — SIGNIFICANT CHANGE UP (ref 3.5–5.3)
PROCALCITONIN SERPL-MCNC: 0.39 NG/ML — HIGH (ref 0.02–0.1)
PROT SERPL-MCNC: 6.7 G/DL — SIGNIFICANT CHANGE UP (ref 6–8.3)
RBC # BLD: 2.8 M/UL — LOW (ref 3.8–5.2)
RBC # FLD: 17.1 % — HIGH (ref 10.3–14.5)
SODIUM SERPL-SCNC: 137 MMOL/L — SIGNIFICANT CHANGE UP (ref 135–145)
WBC # BLD: 3.3 K/UL — LOW (ref 3.8–10.5)
WBC # FLD AUTO: 3.3 K/UL — LOW (ref 3.8–10.5)

## 2020-05-06 PROCEDURE — 99441: CPT | Mod: CR

## 2020-05-06 RX ORDER — INSULIN GLARGINE 100 [IU]/ML
13 INJECTION, SOLUTION SUBCUTANEOUS AT BEDTIME
Refills: 0 | Status: DISCONTINUED | OUTPATIENT
Start: 2020-05-06 | End: 2020-05-07

## 2020-05-06 RX ADMIN — SEVELAMER CARBONATE 2400 MILLIGRAM(S): 2400 POWDER, FOR SUSPENSION ORAL at 13:11

## 2020-05-06 RX ADMIN — SEVELAMER CARBONATE 2400 MILLIGRAM(S): 2400 POWDER, FOR SUSPENSION ORAL at 08:16

## 2020-05-06 RX ADMIN — Medication 2 UNIT(S): at 08:16

## 2020-05-06 RX ADMIN — CLOPIDOGREL BISULFATE 75 MILLIGRAM(S): 75 TABLET, FILM COATED ORAL at 13:11

## 2020-05-06 RX ADMIN — OXYCODONE AND ACETAMINOPHEN 1 TABLET(S): 5; 325 TABLET ORAL at 23:16

## 2020-05-06 RX ADMIN — Medication 81 MILLIGRAM(S): at 13:11

## 2020-05-06 RX ADMIN — Medication 1 APPLICATION(S): at 23:16

## 2020-05-06 RX ADMIN — ATORVASTATIN CALCIUM 20 MILLIGRAM(S): 80 TABLET, FILM COATED ORAL at 21:01

## 2020-05-06 RX ADMIN — INSULIN GLARGINE 13 UNIT(S): 100 INJECTION, SOLUTION SUBCUTANEOUS at 21:01

## 2020-05-06 RX ADMIN — Medication 50 MILLIGRAM(S): at 06:18

## 2020-05-06 RX ADMIN — HEPARIN SODIUM 5000 UNIT(S): 5000 INJECTION INTRAVENOUS; SUBCUTANEOUS at 06:18

## 2020-05-06 RX ADMIN — SEVELAMER CARBONATE 2400 MILLIGRAM(S): 2400 POWDER, FOR SUSPENSION ORAL at 17:12

## 2020-05-06 RX ADMIN — Medication 2 UNIT(S): at 17:10

## 2020-05-06 RX ADMIN — OXYCODONE AND ACETAMINOPHEN 1 TABLET(S): 5; 325 TABLET ORAL at 01:29

## 2020-05-06 RX ADMIN — Medication 100 MILLIGRAM(S): at 21:01

## 2020-05-06 NOTE — DISCHARGE NOTE PROVIDER - NSDCFUADDAPPT_GEN_ALL_CORE_FT
Please follow up with Dr. Ley, endocrine in 1-2 weeks. Please follow up with Dr. Ley, endocrine in 1-2 weeks.      Please apply silvadene and dry gauze to ulcerated area around fistula Please follow up with Dr. Ley, endocrine in 1-2 weeks.      Please follow up with Dr Elizalde via teleconference and you may see him in the office when you are COVID Negative.  Please apply silvadene and dry gauze to ulcerated area around fistula

## 2020-05-06 NOTE — CONSULT NOTE ADULT - ASSESSMENT
64F CAD Sp PTCA w/ stents, CHF, COPD, CVA, DMT1, ESRD on HD noted to have ulceration over L AVF.    -please obtain duplex of the fistula to rule out pseudoaneurysm  -apply silvadene and dry gauze to ulcerated area  vascular surgery will follow    page [phone] with vascular questions  Gaby Pacheco, PGY-4

## 2020-05-06 NOTE — DISCHARGE NOTE PROVIDER - NSDCMRMEDTOKEN_GEN_ALL_CORE_FT
aspirin 81 mg oral delayed release tablet: 1 tab(s) orally once a day  atorvastatin 20 mg oral tablet: 1 tab(s) orally once a day  Basaglar KwikPen 100 units/mL subcutaneous solution: 13 unit(s) subcutaneous once a day (at bedtime)  budesonide 0.5 mg/2 mL inhalation suspension: 2 milliliter(s) inhaled 2 times a day, As Needed  clopidogrel 75 mg oral tablet: 1 tab(s) orally once a day  lisinopril 40 mg oral tablet: 1 tab(s) orally once a day  midodrine 10 mg oral tablet: 1 tab(s) orally 3 times a day  NovoLOG FlexPen 100 units/mL injectable solution: 2 unit(s) subcutaneous 3 times a day (before meals), As Needed  Percocet 10/325 oral tablet: 2 tab(s) orally every 4 hours, As Needed  Note: ususally taken at bedtime only, but recently increased frequency  sevelamer carbonate 800 mg oral tablet: 3 tab(s) orally 3 times a day (with meals)  Toprol-XL 50 mg oral tablet, extended release: 1 tab(s) orally once a day (at bedtime)  Veltassa 8.4 g oral powder for reconstitution: 8.4 gram(s) orally once a day  Note: pt noncompliant acetaminophen 325 mg oral tablet: 2 tab(s) orally every 6 hours, As needed, Temp greater or equal to 38.5C (101.3F), Mild Pain (1 - 3)  aspirin 81 mg oral delayed release tablet: 1 tab(s) orally once a day  atorvastatin 20 mg oral tablet: 1 tab(s) orally once a day  Basaglar KwikPen 100 units/mL subcutaneous solution: 10 unit(s) subcutaneous once a day (at bedtime)  budesonide 0.5 mg/2 mL inhalation suspension: 2 milliliter(s) inhaled 2 times a day, As Needed  cephalexin 500 mg oral capsule: 1 cap(s) orally every 12 hours   clopidogrel 75 mg oral tablet: 1 tab(s) orally once a day  lisinopril 10 mg oral tablet: 1 tab(s) orally once a day   NovoLOG FlexPen 100 units/mL injectable solution: 2 unit(s) subcutaneous 3 times a day (before meals), As Needed  Percocet 10/325 oral tablet: 2 tab(s) orally every 4 hours, As Needed  Note: ususally taken at bedtime only, but recently increased frequency  sevelamer carbonate 800 mg oral tablet: 3 tab(s) orally 3 times a day (with meals)  silver sulfADIAZINE 1% topical cream: 1 application topically once a day  Toprol-XL 50 mg oral tablet, extended release: 1 tab(s) orally once a day (at bedtime)  Veltassa 8.4 g oral powder for reconstitution: 8.4 gram(s) orally once a day  Note: pt noncompliant

## 2020-05-06 NOTE — DISCHARGE NOTE PROVIDER - NSDCCPCAREPLAN_GEN_ALL_CORE_FT
PRINCIPAL DISCHARGE DIAGNOSIS  Diagnosis: Cellulitis  Assessment and Plan of Treatment: PRINCIPAL DISCHARGE DIAGNOSIS  Diagnosis: Cellulitis  Assessment and Plan of Treatment: Cont Abx as prescribed      SECONDARY DISCHARGE DIAGNOSES  Diagnosis: Essential hypertension  Assessment and Plan of Treatment: Essential hypertension    Diagnosis: Mixed hyperlipidemia  Assessment and Plan of Treatment: Mixed hyperlipidemia    Diagnosis: Type 1 diabetes, uncontrolled, with renal manifestation  Assessment and Plan of Treatment: DISPO: basal/bolus Basaglar 10 units QHS and Humalog 2-3 units w/meals. Family assists pt based on BG level and what she is eating  f/u with Dr. Ley, endocrine. PRINCIPAL DISCHARGE DIAGNOSIS  Diagnosis: Cellulitis  Assessment and Plan of Treatment: 5 more days of oral abx.   elevate leg      SECONDARY DISCHARGE DIAGNOSES  Diagnosis: Essential hypertension  Assessment and Plan of Treatment: Essential hypertension    Diagnosis: Mixed hyperlipidemia  Assessment and Plan of Treatment: Mixed hyperlipidemia    Diagnosis: Type 1 diabetes, uncontrolled, with renal manifestation  Assessment and Plan of Treatment: DISPO: basal/bolus Basaglar 10 units QHS and Humalog 2-3 units w/meals. Family assists pt based on BG level and what she is eating  f/u with Dr. Ley, endocrine.

## 2020-05-06 NOTE — CONSULT NOTE ADULT - SUBJECTIVE AND OBJECTIVE BOX
VASCULAR SURGERY CONSULT NOTE    64F CAD Sp PTCA w/ stents, CHF, COPD, CVA, DMT1, ESRD on HD      HPI:  64 F PMH CAD Sp PTCA w/ stents, CHF, COPD, CVA, DMT1, ESRD on HD (M,Tu,Th,Sa), Gout, HLD, PVD, Subclavian Stenosis (L) s/p stent. PSH s/p ASD Repair, s/p idania, s/p fem-pop bypass, recurrent admission for hyper/hypoglycemia/DKA, p/w a 1 day history of worsening LLE swelling, erythema, and warmth. Pt states that she has b/L leg swelling at baseline, but over the past day, she noticed that her left leg has become more swollen and red, states that it is diffusely painful with 10/10 intensity. Pt also has RLE erythema with mild tenderness, but less swelling. She denies dizziness, cough, CP, SOB worse than baseline, abdominal pain, diarrhea. Did not have HD today. (02 May 2020 20:10)      PAST MEDICAL & SURGICAL HISTORY:  COPD (chronic obstructive pulmonary disease)  Localized enlarged lymph nodes  CHF (congestive heart failure): EF 40-45%  Subclavian vein stenosis, left: s/p stent  DKA, type 1: 1/2015  ACS (acute coronary syndrome): 1/2015 - cath revealed 100% ostial stenosis not amenable to PCI - medical management  TIA (transient ischemic attack): x 2 - 8-9 years ago prior to ASD/VSD repair  CAD (coronary artery disease): s/p stents  Gout: past  CVA (cerebral infarction): with no residual, 8 yrs ago, prior to heart surgery - ST memory loss  Peripheral vascular disease: occluded left fem-pop bypass 5/2015  Diabetes mellitus type 1: Insulin Dependent -  ESRD (end stage renal disease): dialysis  M, tue, thursday, saturday  Hyperlipidemia  Status post device closure of ASD: &quot;clamshell&quot;  History of cardiac catheterization: 1/2015 - no intervention  S/P femoral-popliteal bypass surgery: L and R in 2013 with graft; 5/2015 CFA angioplasty left and ileofemoral endarterectomywith vein patch angioplasty of left fem-pop bypass graft  Multiple vascular surgery both leg, left fempop bypass revision 11/2015  AV (arteriovenous fistula): Left AV graft; revision with stent placement 2-3 years ago  S/P cholecystectomy    [  ] No significant past history as reviewed with the patient and family    FAMILY HISTORY:  Family history of smoking  Family history of hypertension  Family history of cancer (Sibling)    [  ] Family history not pertinent as reviewed with the patient and family    SOCIAL HISTORY:    MEDICATIONS  (STANDING):  aspirin enteric coated 81 milliGRAM(s) Oral daily  atorvastatin 20 milliGRAM(s) Oral at bedtime  ceFAZolin   IVPB 1000 milliGRAM(s) IV Intermittent every 24 hours  clopidogrel Tablet 75 milliGRAM(s) Oral daily  dextrose 5%. 1000 milliLiter(s) (50 mL/Hr) IV Continuous <Continuous>  dextrose 50% Injectable 12.5 Gram(s) IV Push once  dextrose 50% Injectable 25 Gram(s) IV Push once  dextrose 50% Injectable 25 Gram(s) IV Push once  heparin   Injectable 5000 Unit(s) SubCutaneous every 12 hours  insulin glargine Injectable (LANTUS) 13 Unit(s) SubCutaneous at bedtime  insulin lispro (HumaLOG) corrective regimen sliding scale   SubCutaneous <User Schedule>  insulin lispro (HumaLOG) corrective regimen sliding scale   SubCutaneous three times a day before meals  insulin lispro (HumaLOG) corrective regimen sliding scale   SubCutaneous at bedtime  insulin lispro Injectable (HumaLOG) 2 Unit(s) SubCutaneous before breakfast  insulin lispro Injectable (HumaLOG) 2 Unit(s) SubCutaneous before lunch  insulin lispro Injectable (HumaLOG) 2 Unit(s) SubCutaneous before dinner  metoprolol succinate ER 50 milliGRAM(s) Oral daily  sevelamer carbonate 2400 milliGRAM(s) Oral three times a day with meals  silver sulfADIAZINE 1% Cream 1 Application(s) Topical daily    MEDICATIONS  (PRN):  acetaminophen   Tablet .. 650 milliGRAM(s) Oral every 6 hours PRN Temp greater or equal to 38.5C (101.3F), Mild Pain (1 - 3)  ALBUTerol    90 MICROgram(s) HFA Inhaler 2 Puff(s) Inhalation every 6 hours PRN Shortness of Breath and/or Wheezing  dextrose 40% Gel 15 Gram(s) Oral once PRN Blood Glucose LESS THAN 70 milliGRAM(s)/deciliter  glucagon  Injectable 1 milliGRAM(s) IntraMuscular once PRN Glucose LESS THAN 70 milligrams/deciliter  oxycodone    5 mG/acetaminophen 325 mG 1 Tablet(s) Oral every 6 hours PRN Moderate Pain (4 - 6)    Allergies    No Known Allergies    Intolerances        Vital Signs Last 24 Hrs  T(C): 36.9 (06 May 2020 17:03), Max: 36.9 (06 May 2020 06:19)  T(F): 98.5 (06 May 2020 17:03), Max: 98.5 (06 May 2020 06:19)  HR: 84 (06 May 2020 17:03) (77 - 84)  BP: 150/79 (06 May 2020 17:03) (122/75 - 150/90)  BP(mean): --  RR: 18 (06 May 2020 17:03) (18 - 18)  SpO2: 95% (06 May 2020 17:03) (95% - 98%)  Daily     Daily                             9.3    3.30  )-----------( 204      ( 06 May 2020 10:16 )             31.6     05-06    137  |  92<L>  |  15  ----------------------------<  127<H>  4.0   |  28  |  5.02<H>    Ca    9.5      06 May 2020 10:16  Phos  4.5     05-06  Mg     2.2     05-06    TPro  6.7  /  Alb  3.7  /  TBili  0.2  /  DBili  x   /  AST  15  /  ALT  <5<L>  /  AlkPhos  191<H>  05-06          IMAGING STUDIES: VASCULAR SURGERY CONSULT NOTE    64F CAD Sp PTCA w/ stents, CHF, COPD, CVA, DMT1, ESRD on HD noted to have ulceration over L AVF.      HPI:  64 F PMH CAD Sp PTCA w/ stents, CHF, COPD, CVA, DMT1, ESRD on HD (M,Tu,Th,Sa), Gout, HLD, PVD, Subclavian Stenosis (L) s/p stent. PSH s/p ASD Repair, s/p idania, s/p fem-pop bypass, recurrent admission for hyper/hypoglycemia/DKA, p/w a 1 day history of worsening LLE swelling, erythema, and warmth. Pt states that she has b/L leg swelling at baseline, but over the past day, she noticed that her left leg has become more swollen and red, states that it is diffusely painful with 10/10 intensity. Pt also has RLE erythema with mild tenderness, but less swelling. She denies dizziness, cough, CP, SOB worse than baseline, abdominal pain, diarrhea. Did not have HD today. (02 May 2020 20:10)      PAST MEDICAL & SURGICAL HISTORY:  COPD (chronic obstructive pulmonary disease)  Localized enlarged lymph nodes  CHF (congestive heart failure): EF 40-45%  Subclavian vein stenosis, left: s/p stent  DKA, type 1: 1/2015  ACS (acute coronary syndrome): 1/2015 - cath revealed 100% ostial stenosis not amenable to PCI - medical management  TIA (transient ischemic attack): x 2 - 8-9 years ago prior to ASD/VSD repair  CAD (coronary artery disease): s/p stents  Gout: past  CVA (cerebral infarction): with no residual, 8 yrs ago, prior to heart surgery - ST memory loss  Peripheral vascular disease: occluded left fem-pop bypass 5/2015  Diabetes mellitus type 1: Insulin Dependent -  ESRD (end stage renal disease): dialysis  M, tue, thursday, saturday  Hyperlipidemia  Status post device closure of ASD: &quot;clamshell&quot;  History of cardiac catheterization: 1/2015 - no intervention  S/P femoral-popliteal bypass surgery: L and R in 2013 with graft; 5/2015 CFA angioplasty left and ileofemoral endarterectomywith vein patch angioplasty of left fem-pop bypass graft  Multiple vascular surgery both leg, left fempop bypass revision 11/2015  AV (arteriovenous fistula): Left AV graft; revision with stent placement 2-3 years ago  S/P cholecystectomy      FAMILY HISTORY:  Family history of smoking  Family history of hypertension  Family history of cancer (Sibling)      SOCIAL HISTORY:    MEDICATIONS  (STANDING):  aspirin enteric coated 81 milliGRAM(s) Oral daily  atorvastatin 20 milliGRAM(s) Oral at bedtime  ceFAZolin   IVPB 1000 milliGRAM(s) IV Intermittent every 24 hours  clopidogrel Tablet 75 milliGRAM(s) Oral daily  dextrose 5%. 1000 milliLiter(s) (50 mL/Hr) IV Continuous <Continuous>  dextrose 50% Injectable 12.5 Gram(s) IV Push once  dextrose 50% Injectable 25 Gram(s) IV Push once  dextrose 50% Injectable 25 Gram(s) IV Push once  heparin   Injectable 5000 Unit(s) SubCutaneous every 12 hours  insulin glargine Injectable (LANTUS) 13 Unit(s) SubCutaneous at bedtime  insulin lispro (HumaLOG) corrective regimen sliding scale   SubCutaneous <User Schedule>  insulin lispro (HumaLOG) corrective regimen sliding scale   SubCutaneous three times a day before meals  insulin lispro (HumaLOG) corrective regimen sliding scale   SubCutaneous at bedtime  insulin lispro Injectable (HumaLOG) 2 Unit(s) SubCutaneous before breakfast  insulin lispro Injectable (HumaLOG) 2 Unit(s) SubCutaneous before lunch  insulin lispro Injectable (HumaLOG) 2 Unit(s) SubCutaneous before dinner  metoprolol succinate ER 50 milliGRAM(s) Oral daily  sevelamer carbonate 2400 milliGRAM(s) Oral three times a day with meals  silver sulfADIAZINE 1% Cream 1 Application(s) Topical daily    MEDICATIONS  (PRN):  acetaminophen   Tablet .. 650 milliGRAM(s) Oral every 6 hours PRN Temp greater or equal to 38.5C (101.3F), Mild Pain (1 - 3)  ALBUTerol    90 MICROgram(s) HFA Inhaler 2 Puff(s) Inhalation every 6 hours PRN Shortness of Breath and/or Wheezing  dextrose 40% Gel 15 Gram(s) Oral once PRN Blood Glucose LESS THAN 70 milliGRAM(s)/deciliter  glucagon  Injectable 1 milliGRAM(s) IntraMuscular once PRN Glucose LESS THAN 70 milligrams/deciliter  oxycodone    5 mG/acetaminophen 325 mG 1 Tablet(s) Oral every 6 hours PRN Moderate Pain (4 - 6)    Allergies    No Known Allergies    Intolerances    PHYSICAL EXAM    Vital Signs Last 24 Hrs  T(C): 36.9 (06 May 2020 17:03), Max: 36.9 (06 May 2020 06:19)  T(F): 98.5 (06 May 2020 17:03), Max: 98.5 (06 May 2020 06:19)  HR: 84 (06 May 2020 17:03) (77 - 84)  BP: 150/79 (06 May 2020 17:03) (122/75 - 150/90)  BP(mean): --  RR: 18 (06 May 2020 17:03) (18 - 18)  SpO2: 95% (06 May 2020 17:03) (95% - 98%)  Daily     Daily     General: WN/WD NAD  Neurology: A&Ox3, nonfocal, CARR x 4  Head:  Normocephalic, atraumatic  ENT:  Mucosa moist, no ulcerations  Neck:  Supple, no sinuses or palpable masses  Lymphatic:  No palpable cervical, supraclavicular, axillary or inguinal adenopathy  Respiratory: CTA B/L  CV: RRR, S1S2, no murmur  Abdominal: Soft, NT, ND no palpable mass  MSK: small area of superficial ulceration over left arm avf palpable thrill, hand warm with palpable pulses                           9.3    3.30  )-----------( 204      ( 06 May 2020 10:16 )             31.6     05-06    137  |  92<L>  |  15  ----------------------------<  127<H>  4.0   |  28  |  5.02<H>    Ca    9.5      06 May 2020 10:16  Phos  4.5     05-06  Mg     2.2     05-06    TPro  6.7  /  Alb  3.7  /  TBili  0.2  /  DBili  x   /  AST  15  /  ALT  <5<L>  /  AlkPhos  191<H>  05-06          IMAGING STUDIES:

## 2020-05-06 NOTE — PROGRESS NOTE ADULT - PROBLEM SELECTOR PLAN 1
-test BG AC/HS  -change Lantus 13 units QHS  -c/w Humalog 2 units AC meals  -c/w Humalog low correction scale AC and Low HS/2AM scale  -renal/diabetic diet with qhs snack  DISPO: basal/bolus with doses TBD  f/u with Dr. Ley, endocrine.

## 2020-05-06 NOTE — DISCHARGE NOTE PROVIDER - HOSPITAL COURSE
History of Present Illness:      64 F PMH CAD Sp PTCA w/ stents, CHF, COPD, CVA, DMT1, ESRD on HD (M,Tu,Th,Sa), Gout, HLD, PVD, Subclavian Stenosis (L) s/p stent. PSH s/p ASD Repair, s/p idania, s/p fem-pop bypass, recurrent admission for hyper/hypoglycemia/DKA, p/w a 1 day history of worsening LLE swelling, erythema, and warmth. Pt states that she has b/L leg swelling at baseline, but over the past day, she noticed that her left leg has become more swollen and red, states that it is diffusely painful with 10/10 intensity. Pt also has RLE erythema with mild tenderness, but less swelling. She denies dizziness, cough, CP, SOB worse than baseline, abdominal pain, diarrhea. Did not have HD today.        Patient was seen by infectious disease doctor whom recommended IV antibiotics for the left lower leg cellulitis.     Patient was followed by nephrology team throughout hospital stay for dialysis.         Remainder of hospital stay unremarkable. Patient stable and ready to be discharged after dialysis. History of Present Illness:     64 F PMH CAD Sp PTCA w/ stents, CHF, COPD, CVA, DMT1, ESRD on HD (M,Tu,Th,Sa), Gout, HLD, PVD, Subclavian Stenosis (L) s/p stent. PSH s/p ASD Repair, s/p idania, s/p fem-pop bypass, recurrent admission for hyper/hypoglycemia/DKA, p/w a 1 day history of worsening LLE swelling, erythema, and warmth. Pt states that she has b/L leg swelling at baseline, but over the past day, she noticed that her left leg has become more swollen and red, states that it is diffusely painful with 10/10 intensity. Pt also has RLE erythema with mild tenderness, but less swelling. She denies dizziness, cough, CP, SOB worse than baseline, abdominal pain, diarrhea. Did not have HD today.        5/4: COVID test positive    Lisinopril discontinued per Renal       94% on RA    No evidence of deep venous thrombosis in either lower extremity.     5/5: Physical therapy evaluated-no skilled needs, episode of epistaxis packed, 98% on RA        Patient was seen by ID whom recommended IV antibiotics for the left lower leg cellulitis. Patient was also followed by nephrology team throughout hospital stay for dialysis. Pt was seen by vascualr for ulceration over L AVF.        Remainder of hospital stay unremarkable. Patient stable and ready to be discharged after dialysis. History of Present Illness:     64 F PMH CAD Sp PTCA w/ stents, CHF, COPD, CVA, DMT1, ESRD on HD (M,Tu,Th,Sa), Gout, HLD, PVD, Subclavian Stenosis (L) s/p stent. PSH s/p ASD Repair, s/p idania, s/p fem-pop bypass, recurrent admission for hyper/hypoglycemia/DKA, p/w a 1 day history of worsening LLE swelling, erythema, and warmth. Pt states that she has b/L leg swelling at baseline, but over the past day, she noticed that her left leg has become more swollen and red, states that it is diffusely painful with 10/10 intensity. Pt also has RLE erythema with mild tenderness, but less swelling. She denies dizziness, cough, CP, SOB worse than baseline, abdominal pain, diarrhea. Did not have HD today.        5/4: COVID test positive  - Lisinopril discontinued per Renal       94% on RA  No evidence of deep venous thrombosis in either lower extremity.     5/5: Physical therapy evaluated-no skilled needs, episode of epistaxis packed, 98% on RA        Patient was seen by ID whom recommended IV antibiotics for the left lower leg cellulitis. Patient was also followed by nephrology team throughout hospital stay for dialysis. Pt was seen by shalinicualr for ulceration over L AVF.        Remainder of hospital stay unremarkable. Patient stable and ready to be discharged after dialysis.

## 2020-05-06 NOTE — PROGRESS NOTE ADULT - PROBLEM SELECTOR PLAN 2
-c/w atorvastatin 20mg po qhs    discussed w/pt and PA  pager: [phone]   cell: [phone]  office; [phone]    -I spent a total time of  16 mins with the patient via phone of which more than 50% of time was spent on counseling/coordination of care.     Due to [facility] policy during the evolving novel coronavirus outbreak, efforts are being made to limit unnecessary patient contacts to limit the spread of disease.   Accordingly, patients without clear indication for physical exam or face to face interview from the Endocrine team will be adjusted in conjunction with conversations with primary provider team. This is being done for the safety of all the patients we care for.

## 2020-05-06 NOTE — DISCHARGE NOTE PROVIDER - NSDCFUADDINST_GEN_ALL_CORE_FT
Please call your primary care doctor within 2-3 weeks of hospital discharge to schedule follow up appointment to discuss hospital stay and further medical management.     Recommended to follow up with your nephrologist after hospital discharge to resume hemodialysis and for further follow up. Please call your primary care doctor within 2-3 weeks of hospital discharge to schedule follow up appointment to discuss hospital stay and further medical management.     Recommended to follow up with your nephrologist after hospital discharge to resume hemodialysis and for further follow up.    Quarantine yourself for 14 days (You are highly Contagious).  Please follow up with your PCP in 1-2 weeks -Call your Provider before hand to make them aware of your hospitalization.  Take Tylenol for Fevers every 6 hours as needed- Do not exceed 4gm of Tylenol in a 24 hour period  Stay hydrated   WEAR A FACE MASK   Cover your cough and sneezes, Clean your hands often, Avoid sharing personal house hold items,  Clean all high touch surfaces- everyday items like table tops , door knobs, cell phones etc   You should restrict activities outside your home except for getting medical care   Avoid using public transportation  Do not go to work, school, or public areas   Monitor your oxygen saturation   Call [place] department of health at [phone]

## 2020-05-06 NOTE — DISCHARGE NOTE PROVIDER - PROVIDER TOKENS
PROVIDER:[TOKEN:[[id number]]] PROVIDER:[TOKEN:[[id number]]],PROVIDER:[TOKEN:[[id number]],FOLLOWUP:[Routine]]

## 2020-05-06 NOTE — DISCHARGE NOTE PROVIDER - CARE PROVIDER_API CALL
Arsalan Castro)  Internal Medicine  [address] [address] [place] [address]  Phone: [phone]  Fax: [phone]  Follow Up Time: Arsalan Castro)  Internal Medicine  [address] [address] [place] [address]  Phone: [phone]  Fax: [phone]  Follow Up Time:     Lance Elizalde)  Surgery; Vascular Surgery  [address] [facility], [address]  [place] [address]  Phone: [phone]  Fax: [phone]  Follow Up Time: Routine

## 2020-05-07 ENCOUNTER — TRANSCRIPTION ENCOUNTER (OUTPATIENT)
Age: 65
End: 2020-05-07

## 2020-05-07 VITALS
OXYGEN SATURATION: 96 % | HEART RATE: 93 BPM | RESPIRATION RATE: 18 BRPM | TEMPERATURE: 98 F | DIASTOLIC BLOOD PRESSURE: 79 MMHG | SYSTOLIC BLOOD PRESSURE: 146 MMHG

## 2020-05-07 LAB
ALBUMIN SERPL ELPH-MCNC: 3.8 G/DL — SIGNIFICANT CHANGE UP (ref 3.3–5)
ALP SERPL-CCNC: 194 U/L — HIGH (ref 40–120)
ALT FLD-CCNC: <5 U/L — LOW (ref 10–45)
ANION GAP SERPL CALC-SCNC: 17 MMOL/L — SIGNIFICANT CHANGE UP (ref 5–17)
ANISOCYTOSIS BLD QL: SLIGHT — SIGNIFICANT CHANGE UP
AST SERPL-CCNC: 12 U/L — SIGNIFICANT CHANGE UP (ref 10–40)
BASOPHILS # BLD AUTO: 0 K/UL — SIGNIFICANT CHANGE UP (ref 0–0.2)
BASOPHILS NFR BLD AUTO: 0 % — SIGNIFICANT CHANGE UP (ref 0–2)
BILIRUB SERPL-MCNC: 0.2 MG/DL — SIGNIFICANT CHANGE UP (ref 0.2–1.2)
BUN SERPL-MCNC: 23 MG/DL — SIGNIFICANT CHANGE UP (ref 7–23)
CALCIUM SERPL-MCNC: 9.5 MG/DL — SIGNIFICANT CHANGE UP (ref 8.4–10.5)
CHLORIDE SERPL-SCNC: 97 MMOL/L — SIGNIFICANT CHANGE UP (ref 96–108)
CO2 SERPL-SCNC: 24 MMOL/L — SIGNIFICANT CHANGE UP (ref 22–31)
CREAT SERPL-MCNC: 6.29 MG/DL — HIGH (ref 0.5–1.3)
DACRYOCYTES BLD QL SMEAR: SLIGHT — SIGNIFICANT CHANGE UP
ELLIPTOCYTES BLD QL SMEAR: SLIGHT — SIGNIFICANT CHANGE UP
EOSINOPHIL # BLD AUTO: 0.22 K/UL — SIGNIFICANT CHANGE UP (ref 0–0.5)
EOSINOPHIL NFR BLD AUTO: 4.4 % — SIGNIFICANT CHANGE UP (ref 0–6)
GIANT PLATELETS BLD QL SMEAR: PRESENT — SIGNIFICANT CHANGE UP
GLUCOSE BLDC GLUCOMTR-MCNC: 110 MG/DL — HIGH (ref 70–99)
GLUCOSE BLDC GLUCOMTR-MCNC: 144 MG/DL — HIGH (ref 70–99)
GLUCOSE BLDC GLUCOMTR-MCNC: 252 MG/DL — HIGH (ref 70–99)
GLUCOSE BLDC GLUCOMTR-MCNC: 45 MG/DL — CRITICAL LOW (ref 70–99)
GLUCOSE BLDC GLUCOMTR-MCNC: 49 MG/DL — LOW (ref 70–99)
GLUCOSE BLDC GLUCOMTR-MCNC: 50 MG/DL — LOW (ref 70–99)
GLUCOSE BLDC GLUCOMTR-MCNC: 96 MG/DL — SIGNIFICANT CHANGE UP (ref 70–99)
GLUCOSE SERPL-MCNC: 45 MG/DL — CRITICAL LOW (ref 70–99)
HCT VFR BLD CALC: 33.2 % — LOW (ref 34.5–45)
HGB BLD-MCNC: 9.9 G/DL — LOW (ref 11.5–15.5)
LYMPHOCYTES # BLD AUTO: 1.31 K/UL — SIGNIFICANT CHANGE UP (ref 1–3.3)
LYMPHOCYTES # BLD AUTO: 26.3 % — SIGNIFICANT CHANGE UP (ref 13–44)
MACROCYTES BLD QL: SIGNIFICANT CHANGE UP
MANUAL SMEAR VERIFICATION: SIGNIFICANT CHANGE UP
MCHC RBC-ENTMCNC: 29.8 GM/DL — LOW (ref 32–36)
MCHC RBC-ENTMCNC: 33.3 PG — SIGNIFICANT CHANGE UP (ref 27–34)
MCV RBC AUTO: 111.8 FL — HIGH (ref 80–100)
MONOCYTES # BLD AUTO: 0.7 K/UL — SIGNIFICANT CHANGE UP (ref 0–0.9)
MONOCYTES NFR BLD AUTO: 14 % — SIGNIFICANT CHANGE UP (ref 2–14)
MYELOCYTES NFR BLD: 0.9 % — HIGH (ref 0–0)
NEUTROPHILS # BLD AUTO: 2.71 K/UL — SIGNIFICANT CHANGE UP (ref 1.8–7.4)
NEUTROPHILS NFR BLD AUTO: 54.4 % — SIGNIFICANT CHANGE UP (ref 43–77)
PLAT MORPH BLD: NORMAL — SIGNIFICANT CHANGE UP
PLATELET # BLD AUTO: 202 K/UL — SIGNIFICANT CHANGE UP (ref 150–400)
POIKILOCYTOSIS BLD QL AUTO: SLIGHT — SIGNIFICANT CHANGE UP
POTASSIUM SERPL-MCNC: 4.3 MMOL/L — SIGNIFICANT CHANGE UP (ref 3.5–5.3)
POTASSIUM SERPL-SCNC: 4.3 MMOL/L — SIGNIFICANT CHANGE UP (ref 3.5–5.3)
PROT SERPL-MCNC: 6.8 G/DL — SIGNIFICANT CHANGE UP (ref 6–8.3)
RBC # BLD: 2.97 M/UL — LOW (ref 3.8–5.2)
RBC # FLD: 17 % — HIGH (ref 10.3–14.5)
RBC BLD AUTO: ABNORMAL
SODIUM SERPL-SCNC: 138 MMOL/L — SIGNIFICANT CHANGE UP (ref 135–145)
TARGETS BLD QL SMEAR: SLIGHT — SIGNIFICANT CHANGE UP
WBC # BLD: 4.98 K/UL — SIGNIFICANT CHANGE UP (ref 3.8–10.5)
WBC # FLD AUTO: 4.98 K/UL — SIGNIFICANT CHANGE UP (ref 3.8–10.5)

## 2020-05-07 PROCEDURE — 86706 HEP B SURFACE ANTIBODY: CPT

## 2020-05-07 PROCEDURE — 85379 FIBRIN DEGRADATION QUANT: CPT

## 2020-05-07 PROCEDURE — 86803 HEPATITIS C AB TEST: CPT

## 2020-05-07 PROCEDURE — 86704 HEP B CORE ANTIBODY TOTAL: CPT

## 2020-05-07 PROCEDURE — 84145 PROCALCITONIN (PCT): CPT

## 2020-05-07 PROCEDURE — 82803 BLOOD GASES ANY COMBINATION: CPT

## 2020-05-07 PROCEDURE — 97530 THERAPEUTIC ACTIVITIES: CPT

## 2020-05-07 PROCEDURE — 85027 COMPLETE CBC AUTOMATED: CPT

## 2020-05-07 PROCEDURE — 82310 ASSAY OF CALCIUM: CPT

## 2020-05-07 PROCEDURE — 83970 ASSAY OF PARATHORMONE: CPT

## 2020-05-07 PROCEDURE — 83605 ASSAY OF LACTIC ACID: CPT

## 2020-05-07 PROCEDURE — 87340 HEPATITIS B SURFACE AG IA: CPT

## 2020-05-07 PROCEDURE — 82010 KETONE BODYS QUAN: CPT

## 2020-05-07 PROCEDURE — 99441: CPT | Mod: CR

## 2020-05-07 PROCEDURE — 97161 PT EVAL LOW COMPLEX 20 MIN: CPT

## 2020-05-07 PROCEDURE — 82947 ASSAY GLUCOSE BLOOD QUANT: CPT

## 2020-05-07 PROCEDURE — 84132 ASSAY OF SERUM POTASSIUM: CPT

## 2020-05-07 PROCEDURE — 87635 SARS-COV-2 COVID-19 AMP PRB: CPT

## 2020-05-07 PROCEDURE — 93990 DOPPLER FLOW TESTING: CPT | Mod: 26

## 2020-05-07 PROCEDURE — 82962 GLUCOSE BLOOD TEST: CPT

## 2020-05-07 PROCEDURE — 93990 DOPPLER FLOW TESTING: CPT

## 2020-05-07 PROCEDURE — 83735 ASSAY OF MAGNESIUM: CPT

## 2020-05-07 PROCEDURE — 99261: CPT

## 2020-05-07 PROCEDURE — 84100 ASSAY OF PHOSPHORUS: CPT

## 2020-05-07 PROCEDURE — 83615 LACTATE (LD) (LDH) ENZYME: CPT

## 2020-05-07 PROCEDURE — 82435 ASSAY OF BLOOD CHLORIDE: CPT

## 2020-05-07 PROCEDURE — 85014 HEMATOCRIT: CPT

## 2020-05-07 PROCEDURE — 93005 ELECTROCARDIOGRAM TRACING: CPT

## 2020-05-07 PROCEDURE — 93970 EXTREMITY STUDY: CPT

## 2020-05-07 PROCEDURE — 97116 GAIT TRAINING THERAPY: CPT

## 2020-05-07 PROCEDURE — 82728 ASSAY OF FERRITIN: CPT

## 2020-05-07 PROCEDURE — 82330 ASSAY OF CALCIUM: CPT

## 2020-05-07 PROCEDURE — 87040 BLOOD CULTURE FOR BACTERIA: CPT

## 2020-05-07 PROCEDURE — 99285 EMERGENCY DEPT VISIT HI MDM: CPT

## 2020-05-07 PROCEDURE — 84295 ASSAY OF SERUM SODIUM: CPT

## 2020-05-07 PROCEDURE — 82550 ASSAY OF CK (CPK): CPT

## 2020-05-07 PROCEDURE — 80053 COMPREHEN METABOLIC PANEL: CPT

## 2020-05-07 RX ORDER — CEPHALEXIN 500 MG
1 CAPSULE ORAL
Qty: 10 | Refills: 0
Start: 2020-05-07 | End: 2020-05-11

## 2020-05-07 RX ORDER — LISINOPRIL 2.5 MG/1
1 TABLET ORAL
Qty: 30 | Refills: 0
Start: 2020-05-07 | End: 2020-06-05

## 2020-05-07 RX ORDER — ACETAMINOPHEN 500 MG
2 TABLET ORAL
Qty: 0 | Refills: 0 | DISCHARGE
Start: 2020-05-07

## 2020-05-07 RX ORDER — LISINOPRIL 2.5 MG/1
1 TABLET ORAL
Qty: 0 | Refills: 0 | DISCHARGE

## 2020-05-07 RX ORDER — INSULIN ASPART 100 [IU]/ML
2 INJECTION, SOLUTION SUBCUTANEOUS
Qty: 0 | Refills: 0 | DISCHARGE

## 2020-05-07 RX ORDER — MIDODRINE HYDROCHLORIDE 2.5 MG/1
1 TABLET ORAL
Qty: 90 | Refills: 0

## 2020-05-07 RX ORDER — DEXTROSE 50 % IN WATER 50 %
12.5 SYRINGE (ML) INTRAVENOUS ONCE
Refills: 0 | Status: COMPLETED | OUTPATIENT
Start: 2020-05-07 | End: 2020-05-07

## 2020-05-07 RX ORDER — INSULIN GLARGINE 100 [IU]/ML
13 INJECTION, SOLUTION SUBCUTANEOUS
Qty: 0 | Refills: 0 | DISCHARGE

## 2020-05-07 RX ORDER — INSULIN GLARGINE 100 [IU]/ML
10 INJECTION, SOLUTION SUBCUTANEOUS AT BEDTIME
Refills: 0 | Status: DISCONTINUED | OUTPATIENT
Start: 2020-05-07 | End: 2020-05-07

## 2020-05-07 RX ADMIN — Medication 2 UNIT(S): at 18:08

## 2020-05-07 RX ADMIN — Medication 1 APPLICATION(S): at 12:06

## 2020-05-07 RX ADMIN — SEVELAMER CARBONATE 2400 MILLIGRAM(S): 2400 POWDER, FOR SUSPENSION ORAL at 08:09

## 2020-05-07 RX ADMIN — Medication 81 MILLIGRAM(S): at 12:06

## 2020-05-07 RX ADMIN — SEVELAMER CARBONATE 2400 MILLIGRAM(S): 2400 POWDER, FOR SUSPENSION ORAL at 12:06

## 2020-05-07 RX ADMIN — Medication 50 MILLIGRAM(S): at 05:39

## 2020-05-07 RX ADMIN — OXYCODONE AND ACETAMINOPHEN 1 TABLET(S): 5; 325 TABLET ORAL at 15:13

## 2020-05-07 RX ADMIN — HEPARIN SODIUM 5000 UNIT(S): 5000 INJECTION INTRAVENOUS; SUBCUTANEOUS at 05:44

## 2020-05-07 RX ADMIN — CLOPIDOGREL BISULFATE 75 MILLIGRAM(S): 75 TABLET, FILM COATED ORAL at 12:06

## 2020-05-07 RX ADMIN — SEVELAMER CARBONATE 2400 MILLIGRAM(S): 2400 POWDER, FOR SUSPENSION ORAL at 18:15

## 2020-05-07 RX ADMIN — Medication 12.5 GRAM(S): at 08:35

## 2020-05-07 RX ADMIN — Medication 2 UNIT(S): at 12:21

## 2020-05-07 RX ADMIN — Medication 3: at 12:20

## 2020-05-07 NOTE — PROVIDER CONTACT NOTE (OTHER) - ACTION/TREATMENT ORDERED:
Pt. given apple juice/FS - rechecked every 15 minutes./Monitor/PP/RN
apple juice and follow hypoglycemia protocol
dextrose 12.5gm ivpx1 repeat Fs
NP made aware. Continue to use gauze to stop bleeding. RN will continue to monitor.
PA made aware. will cont to monitor pt

## 2020-05-07 NOTE — CHART NOTE - NSCHARTNOTEFT_GEN_A_CORE
Spoke to Dr Elizalde.  Went over Duplex results.  No pseudoaneurysm.  Ok for patient to go home and follow up via teleconference in the next 1-2 weeks.  cont silvadene and guaze.

## 2020-05-07 NOTE — PROGRESS NOTE ADULT - PROVIDER SPECIALTY LIST ADULT
Cardiology
Endocrinology
Endocrinology
Infectious Disease
Internal Medicine
Nephrology
Vascular Surgery
Endocrinology

## 2020-05-07 NOTE — PROGRESS NOTE ADULT - PROBLEM SELECTOR PLAN 2
-c/w atorvastatin 20mg po qhs    discussed w/pt and PA  pager: [phone]   cell: [phone]  office; [phone]    -I spent a total time of  16 mins with the patient via phone of which more than 50% of time was spent on counseling/coordination of care.     Due to [facility] policy during the evolving novel coronavirus outbreak, efforts are being made to limit unnecessary patient contacts to limit the spread of disease.   Accordingly, patients without clear indication for physical exam or face to face interview from the Endocrine team will be adjusted in conjunction with conversations with primary provider team. This is being done for the safety of all the patients we care for.

## 2020-05-07 NOTE — PROGRESS NOTE ADULT - REASON FOR ADMISSION
leg pain

## 2020-05-07 NOTE — CHART NOTE - NSCHARTNOTEFT_GEN_A_CORE
Nutrition Initial Assessment    Nutrition Consult Received: Yes [   ]  No [x]    Reason for Initial Nutrition Assessment: length of stay assessment    Source of Information: Unable to conduct a face to face interview due to limited contact restrictions related to pt's medical condition and isolation precautions. Information obtained from a phone call with the pt and from the EMR.     Pt is a 65 yo female who presented with worsening LLE swelling, erythema, and warmth, admitted 5/2. Pt with L leg cellulitis. Of note, pt with prior admission for COVID-19 (4/7-4/13/2020).     PAST MEDICAL & SURGICAL HISTORY:  COPD (chronic obstructive pulmonary disease)  Localized enlarged lymph nodes  CHF (congestive heart failure): EF 40-45%  Subclavian vein stenosis, left: s/p stent  DKA, type 1: 1/2015  ACS (acute coronary syndrome): 1/2015 - cath revealed 100% ostial stenosis not amenable to PCI - medical management  TIA (transient ischemic attack): x 2 - 8-9 years ago prior to ASD/VSD repair  CAD (coronary artery disease): s/p stents  Gout: past  CVA (cerebral infarction): with no residual, 8 yrs ago, prior to heart surgery - ST memory loss  Peripheral vascular disease: occluded left fem-pop bypass 5/2015  Diabetes mellitus type 1: Insulin Dependent -  ESRD (end stage renal disease): dialysis  M, tue, thursday, saturday  Hyperlipidemia  Status post device closure of ASD: &quot;clamshell&quot;  History of cardiac catheterization: 1/2015 - no intervention  S/P femoral-popliteal bypass surgery: L and R in 2013 with graft; 5/2015 CFA angioplasty left and ileofemoral endarterectomywith vein patch angioplasty of left fem-pop bypass graft  Multiple vascular surgery both leg, left fempop bypass revision 11/2015  AV (arteriovenous fistula): Left AV graft; revision with stent placement 2-3 years ago  S/P cholecystectomy    Subjective Information: Pt reports good appetite and intake, consuming 100% of meals. Denies nausea/vomiting/diarrhea/constipation. Last BM yesterday (5/6) per pt. Denies difficulties chewing or swallowing. Confirmed NKFA.    Pt reports good appetite and intake PTA with no recent changes. States  pounds (noted most recent weight of 125.6 pounds on 5/4, weight changes likely related to fluid shifts, pt on HD, will continue to monitor). No vitamin/mineral supplementation PTA reported. Denies oral nutritoin supplement use at home.     Of note, pt seen recently by RD (4/10/2020). At that time, pt reported following diet restrictions in setting of HD treatment, limiting sodium, potassium, and phosphorus intake. Dosing weight at that time 120.3 pounds. Pt also seen by RD on 1/4/2020. At that time, pt stated "she checks her BG 6-7 times per day and  and daughter help manage her DM.  States she watches her carbohydrate intake."  Most recent HbA1c (7.7% on 4/8). A1c likely influenced by hemodialysis treatment, will continue to monitor blood glucose.    Pt declined diet education at this time. At time of previous RD assessment (1/4/2020), RD provided HD diet education and provided handouts. Pt made aware RD remains available and will follow-up with diet education as feasible if pt amenable. Pt with no food preferences at this time. Declined oral nutrition supplementation.     GI Issues: None reported    Current Nutrition Order: Renal, consistent carbohydrate (evening snack)   PO Intake:   Good (%) [x]    Fair (50-75%) [   ]    Poor (<50%) [   ]    Skin Integrity: +skin lesion, no pressure injuries per flowsheets   Edema: +3 edema to b/l legs as per flowsheets (assessed 5/6)     Labs: 05-07 Na138 mmol/L Glu 45 mg/dL<LL> K+ 4.3 mmol/L Cr  6.29 mg/dL<H> BUN 23 mg/dL Phos n/a   Alb 3.8 g/dL PAB n/a       Hemoglobin A1C, Whole Blood: 7.7 % (04-08-20 @ 08:03)    POCT Blood Glucose.: 144 mg/dL (05-07-20 @ 08:43)  POCT Blood Glucose.: 45 mg/dL (05-07-20 @ 08:12)  POCT Blood Glucose.: 50 mg/dL (05-07-20 @ 07:53)  POCT Blood Glucose.: 49 mg/dL (05-07-20 @ 07:51)  POCT Blood Glucose.: 110 mg/dL (05-07-20 @ 01:58)  POCT Blood Glucose.: 201 mg/dL (05-06-20 @ 20:58)  POCT Blood Glucose.: 92 mg/dL (05-06-20 @ 16:59)  POCT Blood Glucose.: 94 mg/dL (05-06-20 @ 12:22)  POCT Blood Glucose.: 50 mg/dL (05-06-20 @ 11:18)  POCT Blood Glucose.: 49 mg/dL (05-06-20 @ 11:17)    Medications:  MEDICATIONS  (STANDING):  aspirin enteric coated 81 milliGRAM(s) Oral daily  atorvastatin 20 milliGRAM(s) Oral at bedtime  ceFAZolin   IVPB 1000 milliGRAM(s) IV Intermittent every 24 hours  clopidogrel Tablet 75 milliGRAM(s) Oral daily  dextrose 5%. 1000 milliLiter(s) (50 mL/Hr) IV Continuous <Continuous>  dextrose 50% Injectable 12.5 Gram(s) IV Push once  dextrose 50% Injectable 25 Gram(s) IV Push once  dextrose 50% Injectable 25 Gram(s) IV Push once  heparin   Injectable 5000 Unit(s) SubCutaneous every 12 hours  insulin glargine Injectable (LANTUS) 13 Unit(s) SubCutaneous at bedtime  insulin lispro (HumaLOG) corrective regimen sliding scale   SubCutaneous <User Schedule>  insulin lispro (HumaLOG) corrective regimen sliding scale   SubCutaneous three times a day before meals  insulin lispro (HumaLOG) corrective regimen sliding scale   SubCutaneous at bedtime  insulin lispro Injectable (HumaLOG) 2 Unit(s) SubCutaneous before breakfast  insulin lispro Injectable (HumaLOG) 2 Unit(s) SubCutaneous before lunch  insulin lispro Injectable (HumaLOG) 2 Unit(s) SubCutaneous before dinner  metoprolol succinate ER 50 milliGRAM(s) Oral daily  sevelamer carbonate 2400 milliGRAM(s) Oral three times a day with meals  silver sulfADIAZINE 1% Cream 1 Application(s) Topical daily    MEDICATIONS  (PRN):  acetaminophen   Tablet .. 650 milliGRAM(s) Oral every 6 hours PRN Temp greater or equal to 38.5C (101.3F), Mild Pain (1 - 3)  ALBUTerol    90 MICROgram(s) HFA Inhaler 2 Puff(s) Inhalation every 6 hours PRN Shortness of Breath and/or Wheezing  dextrose 40% Gel 15 Gram(s) Oral once PRN Blood Glucose LESS THAN 70 milliGRAM(s)/deciliter  glucagon  Injectable 1 milliGRAM(s) IntraMuscular once PRN Glucose LESS THAN 70 milligrams/deciliter  oxycodone    5 mG/acetaminophen 325 mG 1 Tablet(s) Oral every 6 hours PRN Moderate Pain (4 - 6)    Ht: 64 inches (162.6 cm) - reported Wt: 125.6 pounds (57.1 kg)  BMI: 21.6 kg/m2  IBW: 120 pounds +/-10% %IBW: 105%    Nutrition Focused Physical Exam: Unable to complete due to limited isolation contact precautions at this time.     Estimated Energy Needs (30 kcal/kg- 35 kcal/kg): 6406-1685 kcal/day  Estimated Protein Needs (1.2 g/kg- 1.4 g/kg): 69-80 g/day  Defer fluid needs to team  Based on weight of: 5/4 weight 57.1 kg    [x] Nutrition Diagnosis: Increased nutrient needs (calories, protein) related to increased physiological demand as evidenced by ESRD on HD.  [  ] No active nutrition diagnosis at this time  [  ] Current medical condition precludes nutrition intervention    Goal: Pt to meet >75% of estimated nutritional needs during hospital stay.     Nutrition Interventions:   Recommendations:  1) Continue current diet: consistent carbohydrate (evening snack), Renal - monitor/adjust as needed  2) RD to continue to obtain/honor food preferences as feasible, pt made aware RD remains available  3) Continue to obtain pre- and post-HD weights to trend    Monitor PO intake, weight, labs, skin, GI status, diet   RD to follow-up per protocol.  Teagan Sanders, MS, RD, CDN Pager #[phone]

## 2020-05-07 NOTE — DISCHARGE NOTE NURSING/CASE MANAGEMENT/SOCIAL WORK - PATIENT PORTAL LINK FT
You can access the FollowMyHealth Patient Portal offered by [facility] by registering at the following website: [email or web address]. By joining [email or web address]’s FollowMyHealth portal, you will also be able to view your health information using other applications (apps) compatible with our system.

## 2020-05-07 NOTE — DISCHARGE NOTE NURSING/CASE MANAGEMENT/SOCIAL WORK - NSDCFUADDAPPT_GEN_ALL_CORE_FT
Please follow up with Dr. Ley, endocrine in 1-2 weeks.      Please follow up with Dr Elizalde via teleconference and you may see him in the office when you are COVID Negative.  Please apply silvadene and dry gauze to ulcerated area around fistula

## 2020-05-07 NOTE — PROGRESS NOTE ADULT - SUBJECTIVE AND OBJECTIVE BOX
Diabetes Follow up note:    Chief complaint: T1DM    Interval Hx: Glucose values tightly controlled over past 24 hours. Had glucose in 80s prior to bedtime and 90 prior to lunch today. Unable to reach pt via phone.     MEDS:  atorvastatin 20 milliGRAM(s) Oral at bedtime    insulin glargine Injectable (LANTUS) 14 Unit(s) SubCutaneous at bedtime  insulin lispro (HumaLOG) corrective regimen sliding scale   SubCutaneous <User Schedule>  insulin lispro (HumaLOG) corrective regimen sliding scale   SubCutaneous three times a day before meals  insulin lispro (HumaLOG) corrective regimen sliding scale   SubCutaneous at bedtime  insulin lispro Injectable (HumaLOG) 2 Unit(s) SubCutaneous before dinner    ceFAZolin   IVPB 1000 milliGRAM(s) IV Intermittent every 24 hours    Allergies    No Known Allergies        PHYSICAL EXAM: (ID exam 5/5)  General: alert, no acute distress  Eyes:  anicteric, no conjunctival injection, no discharge  Oropharynx: no lesions or injection 	  Neck: supple, without adenopathy  Lungs: clear to auscultation  Heart: regular rate and rhythm; no murmur, rubs or gallops  Abdomen: soft, nondistended, nontender, without mass or organomegaly  Skin: erythema left leg, ? infection vs lipodermatosclerosis  Extremities: no clubbing, cyanosis, + edema left leg  Neurologic: alert, oriented, moves all extremities    Vital Signs Last 24 Hrs  T(C): 37 (05 May 2020 09:33), Max: 37 (05 May 2020 09:33)  T(F): 98.6 (05 May 2020 09:33), Max: 98.6 (05 May 2020 09:33)  HR: 76 (05 May 2020 11:33) (76 - 80)  BP: 155/68 (05 May 2020 11:33) (152/72 - 162/77)  BP(mean): --  RR: 20 (05 May 2020 09:33) (18 - 20)  SpO2: 99% (05 May 2020 11:33) (93% - 99%)      LABS:  POCT Blood Glucose.: 90 mg/dL (05-05-20 @ 12:00)  POCT Blood Glucose.: 176 mg/dL (05-05-20 @ 08:05)  POCT Blood Glucose.: 154 mg/dL (05-05-20 @ 02:08)  POCT Blood Glucose.: 102 mg/dL (05-04-20 @ 22:33)  POCT Blood Glucose.: 85 mg/dL (05-04-20 @ 21:51)  POCT Blood Glucose.: 83 mg/dL (05-04-20 @ 21:25)  POCT Blood Glucose.: 250 mg/dL (05-04-20 @ 17:23)  POCT Blood Glucose.: 214 mg/dL (05-04-20 @ 11:58)  POCT Blood Glucose.: 250 mg/dL (05-04-20 @ 07:58)  POCT Blood Glucose.: 143 mg/dL (05-04-20 @ 01:16)  POCT Blood Glucose.: 260 mg/dL (05-03-20 @ 21:01)  POCT Blood Glucose.: 237 mg/dL (05-03-20 @ 17:09)  POCT Blood Glucose.: 134 mg/dL (05-03-20 @ 12:49)  POCT Blood Glucose.: 75 mg/dL (05-03-20 @ 12:22)  POCT Blood Glucose.: 55 mg/dL (05-03-20 @ 11:59)  POCT Blood Glucose.: 61 mg/dL (05-03-20 @ 11:58)  POCT Blood Glucose.: 99 mg/dL (05-03-20 @ 07:57)  POCT Blood Glucose.: 180 mg/dL (05-02-20 @ 22:40)  POCT Blood Glucose.: 246 mg/dL (05-02-20 @ 21:16)  POCT Blood Glucose.: 313 mg/dL (05-02-20 @ 20:02)  POCT Blood Glucose.: 386 mg/dL (05-02-20 @ 18:58)  POCT Blood Glucose.: 339 mg/dL (05-02-20 @ 17:18)                            9.5    3.14  )-----------( 237      ( 04 May 2020 09:58 )             31.2       05-04    137  |  92<L>  |  17  ----------------------------<  257<H>  4.5   |  25  |  4.51<H>    Ca    9.5      04 May 2020 09:58    TPro  7.0  /  Alb  4.1  /  TBili  0.3  /  DBili  x   /  AST  15  /  ALT  5<L>  /  AlkPhos  225<H>  05-04        Hemoglobin A1C, Whole Blood: 7.7 % (04-08-20 @ 08:03)  Hemoglobin A1C, Whole Blood: 8.2 % (01-18-20 @ 09:41)  Hemoglobin A1C, Whole Blood: 8.7 % (11-13-19 @ 23:44)  Hemoglobin A1C, Whole Blood: 8.1 % (09-16-19 @ 08:04)  Hemoglobin A1C, Whole Blood: 8.2 % (06-16-19 @ 13:24)          Contact number: isabel [phone] or [phone]
CC: f/u for LLE cellulitis    Patient reports: she has left leg swelling, although she claims it is always more swollen than rt leg    REVIEW OF SYSTEMS:  All other review of systems negative (Comprehensive ROS)    Antimicrobials Day #  :day 2  ceFAZolin   IVPB 1000 milliGRAM(s) IV Intermittent every 24 hours    Other Medications Reviewed    T(F): 97.9 (05-04-20 @ 07:59), Max: 99.1 (05-03-20 @ 17:03)  HR: 79 (05-04-20 @ 07:59)  BP: 140/74 (05-04-20 @ 07:59)  RR: 18 (05-04-20 @ 07:59)  SpO2: 94% (05-04-20 @ 07:59)  Wt(kg): --    PHYSICAL EXAM:  General: alert, no acute distress  Eyes:  anicteric, no conjunctival injection, no discharge  Oropharynx: no lesions or injection 	  Neck: supple, without adenopathy  Lungs: clear to auscultation  Heart: regular rate and rhythm; no murmur, rubs or gallops  Abdomen: soft, nondistended, nontender, without mass or organomegaly  Skin: erythema of left calf  Extremities: no clubbing, cyanosis,+ edema left leg  Neurologic: alert, oriented, moves all extremities    LAB RESULTS:                        9.4    4.20  )-----------( 269      ( 03 May 2020 17:46 )             31.1     05-03    139  |  90<L>  |  34<H>  ----------------------------<  229<H>  5.5<H>   |  26  |  6.69<H>    Ca    9.1      03 May 2020 17:46  Phos  6.5     05-02  Mg     2.5     05-02    TPro  6.7  /  Alb  3.8  /  TBili  0.3  /  DBili  x   /  AST  14  /  ALT  7<L>  /  AlkPhos  219<H>  05-03    LIVER FUNCTIONS - ( 03 May 2020 17:46 )  Alb: 3.8 g/dL / Pro: 6.7 g/dL / ALK PHOS: 219 U/L / ALT: 7 U/L / AST: 14 U/L / GGT: x             MICROBIOLOGY:  RECENT CULTURES:      RADIOLOGY REVIEWED:  < from: [facility] Duplex Lower Ext Vein Scan, Bil[place] (05.02.20 @ 18:31) >  IMPRESSION:     No evidence of deep venous thrombosis in either lower extremity.    < end of copied text >
CC: f/u for Left leg cellulitis and COVID 19    Patient reports: no complaints, desire to go home    REVIEW OF SYSTEMS:  All other review of systems negative (Comprehensive ROS)    Antimicrobials Day #  :day 4  ceFAZolin   IVPB 1000 milliGRAM(s) IV Intermittent every 24 hours    Other Medications Reviewed    T(F): 98.3 (05-06-20 @ 08:28), Max: 98.5 (05-05-20 @ 15:15)  HR: 81 (05-06-20 @ 08:28)  BP: 122/75 (05-06-20 @ 08:28)  RR: 18 (05-06-20 @ 08:28)  SpO2: 95% (05-06-20 @ 08:28)  Wt(kg): --    PHYSICAL EXAM:  General: alert, no acute distress  Eyes:  anicteric, no conjunctival injection, no discharge  Oropharynx: no lesions or injection 	  Neck: supple, without adenopathy  Lungs: clear to auscultation  Heart: regular rate and rhythm; no murmur, rubs or gallops  Abdomen: soft, nondistended, nontender, without mass or organomegaly  Skin: fading left leg erythema  Extremities: no clubbing, cyanosis, + edema left leg  Neurologic: alert, oriented, moves all extremities    LAB RESULTS:                        9.3    3.30  )-----------( 204      ( 06 May 2020 10:16 )             31.6     05-06    137  |  92<L>  |  15  ----------------------------<  127<H>  4.0   |  28  |  5.02<H>    Ca    9.5      06 May 2020 10:16  Phos  4.5     05-06  Mg     2.2     05-06    TPro  6.7  /  Alb  3.7  /  TBili  0.2  /  DBili  x   /  AST  15  /  ALT  <5<L>  /  AlkPhos  191<H>  05-06    LIVER FUNCTIONS - ( 06 May 2020 10:16 )  Alb: 3.7 g/dL / Pro: 6.7 g/dL / ALK PHOS: 191 U/L / ALT: <5 U/L / AST: 15 U/L / GGT: x             MICROBIOLOGY:  RECENT CULTURES:  05-03 @ 12:51 .Blood Blood     No growth to date.          RADIOLOGY REVIEWED:
CC: f/u for cellulitis left leg    Patient reports  she is happy about going home, leg much better  REVIEW OF SYSTEMS:  All other review of systems negative (Comprehensive ROS)    Antimicrobials Day #  :5/10  ceFAZolin   IVPB 1000 milliGRAM(s) IV Intermittent every 24 hours    Other Medications Reviewed    T(F): 98.2 (05-07-20 @ 14:50), Max: 98.3 (05-07-20 @ 08:55)  HR: 76 (05-07-20 @ 14:50)  BP: 151/56 (05-07-20 @ 14:50)  RR: 18 (05-07-20 @ 14:50)  SpO2: 98% (05-07-20 @ 14:50)  Wt(kg): --    PHYSICAL EXAM:  General: alert, no acute distress  Eyes:  anicteric, no conjunctival injection, no discharge  Oropharynx: no lesions or injection 	  Neck: supple, without adenopathy  Lungs: clear to auscultation  Heart: s1s2  Abdomen: soft, nondistended, nontender, without mass or organomegaly  Skin: no lesions  Extremities: left leg  edema with fading pretibial redness, no warmth or tenderness, medial thigh scar but no redness or tenderness  Neurologic: alert, oriented, moves all extremities    LAB RESULTS:                        9.9    4.98  )-----------( 202      ( 07 May 2020 08:24 )             33.2     05-07    138  |  97  |  23  ----------------------------<  45<LL>  4.3   |  24  |  6.29<H>    Ca    9.5      07 May 2020 08:24  Phos  4.5     05-06  Mg     2.2     05-06    TPro  6.8  /  Alb  3.8  /  TBili  0.2  /  DBili  x   /  AST  12  /  ALT  <5<L>  /  AlkPhos  194<H>  05-07    LIVER FUNCTIONS - ( 07 May 2020 08:24 )  Alb: 3.8 g/dL / Pro: 6.8 g/dL / ALK PHOS: 194 U/L / ALT: <5 U/L / AST: 12 U/L / GGT: x             MICROBIOLOGY:  RECENT CULTURES:  05-03 @ 12:51 .Blood Blood     No growth to date.          RADIOLOGY REVIEWED:  < from: [facility] Duplex Lower Ext Vein Scan, [place] (05.02.20 @ 18:31) >  EXAM:  DUPLEX SCAN EXT VEINS LOWER BI                            PROCEDURE DATE:  05/02/2020            INTERPRETATION:  CLINICAL INFORMATION: Bilateral lower extremity pain and swelling, left greater than right    COMPARISON: Venous ultrasound of the lower extremities dated 4/8/2020.    TECHNIQUE: Duplex sonography of the BILATERAL LOWER extremity veins with color and spectral Doppler, with and without compression.      FINDINGS:    There is normal compressibility of the bilateral common femoral, femoral and popliteal veins.     Doppler examination shows normal spontaneous and phasic flow.    No calf vein thrombosis is detected.    IMPRESSION:     No evidence of deep venous thrombosis in either lower extremity.    < end of copied text >              Assessment:  Patient with esrd on dialysis, cope,  dm, pvd,/ LE bypass,  recent stay for covid which she recovered from ,  returns with cellulitis of the left leg which is responding  well to antibiotics.   Plan:  for discharge today after dialysis to get 5 more days of po keflex  elevate leg
CC: f/u for left leg pain and erythema    Patient reports: improvement in left leg pain.    REVIEW OF SYSTEMS:  All other review of systems negative (Comprehensive ROS)    Antimicrobials Day #  :day 3  ceFAZolin   IVPB 1000 milliGRAM(s) IV Intermittent every 24 hours    Other Medications Reviewed    T(F): 98.6 (05-05-20 @ 09:33), Max: 98.6 (05-05-20 @ 09:33)  HR: 76 (05-05-20 @ 11:33)  BP: 155/68 (05-05-20 @ 11:33)  RR: 20 (05-05-20 @ 09:33)  SpO2: 99% (05-05-20 @ 11:33)  Wt(kg): --    PHYSICAL EXAM:  General: alert, no acute distress  Eyes:  anicteric, no conjunctival injection, no discharge  Oropharynx: no lesions or injection 	  Neck: supple, without adenopathy  Lungs: clear to auscultation  Heart: regular rate and rhythm; no murmur, rubs or gallops  Abdomen: soft, nondistended, nontender, without mass or organomegaly  Skin: erythema left leg, ? infection vs lipodermatosclerosis  Extremities: no clubbing, cyanosis, + edema left leg  Neurologic: alert, oriented, moves all extremities    LAB RESULTS:                        9.5    3.14  )-----------( 237      ( 04 May 2020 09:58 )             31.2     05-04    137  |  92<L>  |  17  ----------------------------<  257<H>  4.5   |  25  |  4.51<H>    Ca    9.5      04 May 2020 09:58    TPro  7.0  /  Alb  4.1  /  TBili  0.3  /  DBili  x   /  AST  15  /  ALT  5<L>  /  AlkPhos  225<H>  05-04    LIVER FUNCTIONS - ( 04 May 2020 09:58 )  Alb: 4.1 g/dL / Pro: 7.0 g/dL / ALK PHOS: 225 U/L / ALT: 5 U/L / AST: 15 U/L / GGT: x             MICROBIOLOGY:  RECENT CULTURES:  05-03 @ 12:51 .Blood Blood     No growth to date.          RADIOLOGY REVIEWED:  < from: [facility] Duplex Lower Ext Vein Scan, Left (04.08.20 @ 19:35) >  IMPRESSION:     No evidence of left lower extremity deep venous thrombosis.    < end of copied text >
Cardiovascular Disease Progress Note    Overnight events: No acute events overnight.  still with complaints of leg pain and edema. no cp/sob  Otherwise review of systems negative    Objective Findings:  T(C): 36.7 (05-05-20 @ 05:06), Max: 36.7 (05-04-20 @ 17:21)  HR: 80 (05-05-20 @ 06:20) (78 - 80)  BP: 158/80 (05-05-20 @ 06:20) (152/72 - 162/77)  RR: 18 (05-05-20 @ 05:06) (18 - 18)  SpO2: 98% (05-05-20 @ 05:06) (94% - 98%)  Wt(kg): --  Daily     Daily       Physical Exam:  Gen: NAD  HEENT: EOMI  CV: RRR, normal S1 + S2, 2/6 heraclio  Lungs: CTAB  Abd: soft, non-tender  Ext: + edema        Laboratory Data:                        9.5    3.14  )-----------( 237      ( 04 May 2020 09:58 )             31.2     05-04    137  |  92<L>  |  17  ----------------------------<  257<H>  4.5   |  25  |  4.51<H>    Ca    9.5      04 May 2020 09:58    TPro  7.0  /  Alb  4.1  /  TBili  0.3  /  DBili  x   /  AST  15  /  ALT  5<L>  /  AlkPhos  225<H>  05-04              Inpatient Medications:  MEDICATIONS  (STANDING):  aspirin enteric coated 81 milliGRAM(s) Oral daily  atorvastatin 20 milliGRAM(s) Oral at bedtime  ceFAZolin   IVPB 1000 milliGRAM(s) IV Intermittent every 24 hours  clopidogrel Tablet 75 milliGRAM(s) Oral daily  dextrose 5%. 1000 milliLiter(s) (50 mL/Hr) IV Continuous <Continuous>  dextrose 50% Injectable 12.5 Gram(s) IV Push once  dextrose 50% Injectable 25 Gram(s) IV Push once  dextrose 50% Injectable 25 Gram(s) IV Push once  heparin   Injectable 5000 Unit(s) SubCutaneous every 12 hours  insulin glargine Injectable (LANTUS) 14 Unit(s) SubCutaneous at bedtime  insulin lispro (HumaLOG) corrective regimen sliding scale   SubCutaneous <User Schedule>  insulin lispro (HumaLOG) corrective regimen sliding scale   SubCutaneous three times a day before meals  insulin lispro (HumaLOG) corrective regimen sliding scale   SubCutaneous at bedtime  insulin lispro Injectable (HumaLOG) 3 Unit(s) SubCutaneous before breakfast  insulin lispro Injectable (HumaLOG) 3 Unit(s) SubCutaneous before lunch  insulin lispro Injectable (HumaLOG) 3 Unit(s) SubCutaneous before dinner  metoprolol succinate ER 50 milliGRAM(s) Oral daily  sevelamer carbonate 2400 milliGRAM(s) Oral three times a day with meals      Assessment:  -leg pain/swelling --> likely component of chf/volume overload + severe pad/CLI  -s/p novel coronavirus/sars-cov2 infection 4/7/20  -ischemic cardiomyopathy c/b mod to severe LV dysfunction, cad s/p multiple stents  -esrd on hd  -PAD s/p prior interventions  -copd       Recs:  -optimize vol status with hd. dr mildred blevins following  -c/w dapt and statin for cad/pad. extensive CAD and ICM. s/p [facility] 2/20/2019 --> severe and diffuse instent restenosis along RCA --> unable to stent due to multiple layers of stenting and prior brachytherapy  -c/w metop and lisinopril for gdmt for lv dysfunction; NSVT and pAT  -midodrine discontinued as can worsen vasoconstriction/PAD sx. please restart lisinopril at 10m daily  -s/p [facility]TE 2019: mod-severe MR, pseudo AS (low flow-low gradient), mod-severe LV dysfunction --> plan is for medical management given surgical risk and patient preference  -dvt ppx          Over 25 minutes spent on total encounter; more than 50% of the visit was spent counseling and/or coordinating care by the attending physician.      Julián Biswas MD   Cardiovascular Disease  [phone]
Diabetes Follow up note:    Chief complaint: T1DM    Interval Hx: Pt w/fasting hypoglycemia to 49mg/dl this AM. Said she didn't eat her snack at bedtime as she normally does. Discharge planning after HD today per patient.     Review of Systems:  General: no complaints.   GI: Tolerating POs. Denies N/V/D/Abd pain  CV: Denies CP/SOB  ENDO: No S&Sx of hypoglycemia. + hypoglycemia unawareness    MEDS:  atorvastatin 20 milliGRAM(s) Oral at bedtime  insulin glargine Injectable (LANTUS) 13 Unit(s) SubCutaneous at bedtime  insulin lispro (HumaLOG) corrective regimen sliding scale   SubCutaneous <User Schedule>  insulin lispro (HumaLOG) corrective regimen sliding scale   SubCutaneous three times a day before meals  insulin lispro (HumaLOG) corrective regimen sliding scale   SubCutaneous at bedtime  insulin lispro Injectable (HumaLOG) 2 Unit(s) SubCutaneous before breakfast  insulin lispro Injectable (HumaLOG) 2 Unit(s) SubCutaneous before lunch  insulin lispro Injectable (HumaLOG) 2 Unit(s) SubCutaneous before dinner    ceFAZolin   IVPB 1000 milliGRAM(s) IV Intermittent every 24 hours    Allergies    No Known Allergies      Physical Exam: (Nephrology exam 5/6)  	  Gen: Non toxic comfortable appearing   	no jvd   	Pulm: decrease bs  no rales or ronchi   	CV: RRR, S1S2; no rub  	Abd: +BS, soft, nontender/nondistended  	: No suprapubic tenderness  	UE: Warm, no cyanosis  no clubbing,  no edema  	LE: Warm, no cyanosis  no clubbing, LLE  edema + erythema  2+ LLE and 1-2- RLE   	Neuro: alert and oriented. speech is coherent     Vital Signs Last 24 Hrs  T(C): 36.8 (07 May 2020 08:55), Max: 36.9 (06 May 2020 17:03)  T(F): 98.3 (07 May 2020 08:55), Max: 98.5 (06 May 2020 17:03)  HR: 82 (07 May 2020 08:55) (75 - 84)  BP: 133/76 (07 May 2020 08:55) (125/73 - 150/79)  BP(mean): --  RR: 18 (07 May 2020 08:55) (18 - 18)  SpO2: 96% (07 May 2020 08:55) (95% - 97%)      LABS:  POCT Blood Glucose.: 252 mg/dL (05-07-20 @ 12:02)  POCT Blood Glucose.: 144 mg/dL (05-07-20 @ 08:43)  POCT Blood Glucose.: 45 mg/dL (05-07-20 @ 08:12)  POCT Blood Glucose.: 50 mg/dL (05-07-20 @ 07:53)  POCT Blood Glucose.: 49 mg/dL (05-07-20 @ 07:51)  POCT Blood Glucose.: 110 mg/dL (05-07-20 @ 01:58)  POCT Blood Glucose.: 201 mg/dL (05-06-20 @ 20:58)  POCT Blood Glucose.: 92 mg/dL (05-06-20 @ 16:59)  POCT Blood Glucose.: 94 mg/dL (05-06-20 @ 12:22)  POCT Blood Glucose.: 50 mg/dL (05-06-20 @ 11:18)  POCT Blood Glucose.: 49 mg/dL (05-06-20 @ 11:17)  POCT Blood Glucose.: 146 mg/dL (05-06-20 @ 08:06)  POCT Blood Glucose.: 121 mg/dL (05-06-20 @ 01:44)  POCT Blood Glucose.: 133 mg/dL (05-05-20 @ 21:57)  POCT Blood Glucose.: 97 mg/dL (05-05-20 @ 19:54)  POCT Blood Glucose.: 90 mg/dL (05-05-20 @ 12:00)  POCT Blood Glucose.: 176 mg/dL (05-05-20 @ 08:05)  POCT Blood Glucose.: 154 mg/dL (05-05-20 @ 02:08)  POCT Blood Glucose.: 102 mg/dL (05-04-20 @ 22:33)  POCT Blood Glucose.: 85 mg/dL (05-04-20 @ 21:51)  POCT Blood Glucose.: 83 mg/dL (05-04-20 @ 21:25)  POCT Blood Glucose.: 250 mg/dL (05-04-20 @ 17:23)                            9.9    4.98  )-----------( 202      ( 07 May 2020 08:24 )             33.2       05-07    138  |  97  |  23  ----------------------------<  45<LL>  4.3   |  24  |  6.29<H>    Ca    9.5      07 May 2020 08:24  Phos  4.5     05-06  Mg     2.2     05-06    TPro  6.8  /  Alb  3.8  /  TBili  0.2  /  DBili  x   /  AST  12  /  ALT  <5<L>  /  AlkPhos  194<H>  05-07        Hemoglobin A1C, Whole Blood: 7.7 % (04-08-20 @ 08:03)  Hemoglobin A1C, Whole Blood: 8.2 % (01-18-20 @ 09:41)  Hemoglobin A1C, Whole Blood: 8.7 % (11-13-19 @ 23:44)  Hemoglobin A1C, Whole Blood: 8.1 % (09-16-19 @ 08:04)  Hemoglobin A1C, Whole Blood: 8.2 % (06-16-19 @ 13:24)          Contact number: isabel [phone] or [phone]
Diabetes Follow up note:    Chief complaint: T1DM    Interval Hx: Pt w/glucose values well controlled until lunchtime today. Had hypoglycemia to 50. Spoke w/pt via telephone. Reports was not hungry this morning and decided to skip breakfast. Ate lunch after hypoglycemia.     Review of Systems:  General:  "I'm going home tomorrow"  GI: Tolerating POs. Denies N/V/D/Abd pain  CV: Denies CP/SOB  ENDO: No S&Sx of hypoglycemia  MEDS:  atorvastatin 20 milliGRAM(s) Oral at bedtime    insulin glargine Injectable (LANTUS) 14 Unit(s) SubCutaneous at bedtime  insulin lispro (HumaLOG) corrective regimen sliding scale   SubCutaneous <User Schedule>  insulin lispro (HumaLOG) corrective regimen sliding scale   SubCutaneous three times a day before meals  insulin lispro (HumaLOG) corrective regimen sliding scale   SubCutaneous at bedtime  insulin lispro Injectable (HumaLOG) 2 Unit(s) SubCutaneous before breakfast  insulin lispro Injectable (HumaLOG) 2 Unit(s) SubCutaneous before lunch  insulin lispro Injectable (HumaLOG) 2 Unit(s) SubCutaneous before dinner    ceFAZolin   IVPB 1000 milliGRAM(s) IV Intermittent every 24 hours    Allergies    No Known Allergies          PHYSICAL EXAM: (ID exam 5/6)    General: alert, no acute distress  Eyes:  anicteric, no conjunctival injection, no discharge  Oropharynx: no lesions or injection 	  Neck: supple, without adenopathy  Lungs: clear to auscultation  Heart: regular rate and rhythm; no murmur, rubs or gallops  Abdomen: soft, nondistended, nontender, without mass or organomegaly  Skin: fading left leg erythema  Extremities: no clubbing, cyanosis, + edema left leg  Neurologic: alert, oriented, moves all extremities    Vital Signs Last 24 Hrs  T(C): 36.8 (06 May 2020 08:28), Max: 36.9 (05 May 2020 15:15)  T(F): 98.3 (06 May 2020 08:28), Max: 98.5 (05 May 2020 15:15)  HR: 81 (06 May 2020 08:28) (71 - 81)  BP: 122/75 (06 May 2020 08:28) (122/75 - 150/90)  BP(mean): --  RR: 18 (06 May 2020 08:28) (18 - 20)  SpO2: 95% (06 May 2020 08:28) (95% - 100%)    LABS:  POCT Blood Glucose.: 94 mg/dL (05-06-20 @ 12:22)  POCT Blood Glucose.: 50 mg/dL (05-06-20 @ 11:18)  POCT Blood Glucose.: 49 mg/dL (05-06-20 @ 11:17)  POCT Blood Glucose.: 146 mg/dL (05-06-20 @ 08:06)  POCT Blood Glucose.: 121 mg/dL (05-06-20 @ 01:44)  POCT Blood Glucose.: 133 mg/dL (05-05-20 @ 21:57)  POCT Blood Glucose.: 97 mg/dL (05-05-20 @ 19:54)  POCT Blood Glucose.: 90 mg/dL (05-05-20 @ 12:00)  POCT Blood Glucose.: 176 mg/dL (05-05-20 @ 08:05)  POCT Blood Glucose.: 154 mg/dL (05-05-20 @ 02:08)  POCT Blood Glucose.: 102 mg/dL (05-04-20 @ 22:33)  POCT Blood Glucose.: 85 mg/dL (05-04-20 @ 21:51)  POCT Blood Glucose.: 83 mg/dL (05-04-20 @ 21:25)  POCT Blood Glucose.: 250 mg/dL (05-04-20 @ 17:23)  POCT Blood Glucose.: 214 mg/dL (05-04-20 @ 11:58)  POCT Blood Glucose.: 250 mg/dL (05-04-20 @ 07:58)  POCT Blood Glucose.: 143 mg/dL (05-04-20 @ 01:16)  POCT Blood Glucose.: 260 mg/dL (05-03-20 @ 21:01)  POCT Blood Glucose.: 237 mg/dL (05-03-20 @ 17:09)                            9.3    3.30  )-----------( 204      ( 06 May 2020 10:16 )             31.6       05-06    137  |  92<L>  |  15  ----------------------------<  127<H>  4.0   |  28  |  5.02<H>    Ca    9.5      06 May 2020 10:16  Phos  4.5     05-06  Mg     2.2     05-06    TPro  6.7  /  Alb  3.7  /  TBili  0.2  /  DBili  x   /  AST  15  /  ALT  <5<L>  /  AlkPhos  191<H>  05-06    Hemoglobin A1C, Whole Blood: 7.7 % (04-08-20 @ 08:03)  Hemoglobin A1C, Whole Blood: 8.2 % (01-18-20 @ 09:41)  Hemoglobin A1C, Whole Blood: 8.7 % (11-13-19 @ 23:44)  Hemoglobin A1C, Whole Blood: 8.1 % (09-16-19 @ 08:04)  Hemoglobin A1C, Whole Blood: 8.2 % (06-16-19 @ 13:24)          Contact number: isabel [phone] or [phone]
[facility]   [phone]  RENAL FOLLOW UP NOTE  --------------------------------------------------------------------------------  Chief Complaint:    24 hour events/subjective:    seen earlier   states edema is improving and redness over the leg is improving       PAST HISTORY  --------------------------------------------------------------------------------  No significant changes to PMH, PSH, FHx, SHx, unless otherwise noted    ALLERGIES & MEDICATIONS  --------------------------------------------------------------------------------  Allergies    No Known Allergies    Intolerances      Standing Inpatient Medications  aspirin enteric coated 81 milliGRAM(s) Oral daily  atorvastatin 20 milliGRAM(s) Oral at bedtime  ceFAZolin   IVPB 1000 milliGRAM(s) IV Intermittent every 24 hours  clopidogrel Tablet 75 milliGRAM(s) Oral daily  dextrose 5%. 1000 milliLiter(s) IV Continuous <Continuous>  dextrose 50% Injectable 12.5 Gram(s) IV Push once  dextrose 50% Injectable 25 Gram(s) IV Push once  dextrose 50% Injectable 25 Gram(s) IV Push once  heparin   Injectable 5000 Unit(s) SubCutaneous every 12 hours  insulin glargine Injectable (LANTUS) 13 Unit(s) SubCutaneous at bedtime  insulin lispro (HumaLOG) corrective regimen sliding scale   SubCutaneous <User Schedule>  insulin lispro (HumaLOG) corrective regimen sliding scale   SubCutaneous three times a day before meals  insulin lispro (HumaLOG) corrective regimen sliding scale   SubCutaneous at bedtime  insulin lispro Injectable (HumaLOG) 2 Unit(s) SubCutaneous before breakfast  insulin lispro Injectable (HumaLOG) 2 Unit(s) SubCutaneous before lunch  insulin lispro Injectable (HumaLOG) 2 Unit(s) SubCutaneous before dinner  metoprolol succinate ER 50 milliGRAM(s) Oral daily  sevelamer carbonate 2400 milliGRAM(s) Oral three times a day with meals  silver sulfADIAZINE 1% Cream 1 Application(s) Topical daily    PRN Inpatient Medications  acetaminophen   Tablet .. 650 milliGRAM(s) Oral every 6 hours PRN  ALBUTerol    90 MICROgram(s) HFA Inhaler 2 Puff(s) Inhalation every 6 hours PRN  dextrose 40% Gel 15 Gram(s) Oral once PRN  glucagon  Injectable 1 milliGRAM(s) IntraMuscular once PRN  oxycodone    5 mG/acetaminophen 325 mG 1 Tablet(s) Oral every 6 hours PRN      REVIEW OF SYSTEMS  --------------------------------------------------------------------------------    Gen: denies fevers/chills,  CVS: denies chest pain/palpitations  Resp: denies SOB/Cough  GI: Denies N/V/Abd pain  : Denies dysuria     All other systems were reviewed and are negative, except as noted.    VITALS/PHYSICAL EXAM  --------------------------------------------------------------------------------  T(C): 36.9 (05-06-20 @ 17:03), Max: 36.9 (05-06-20 @ 06:19)  HR: 84 (05-06-20 @ 17:03) (77 - 84)  BP: 150/79 (05-06-20 @ 17:03) (122/75 - 150/79)  RR: 18 (05-06-20 @ 17:03) (18 - 18)  SpO2: 95% (05-06-20 @ 17:03) (95% - 98%)  Wt(kg): --        05-05-20 @ 07:01  -  05-06-20 @ 07:00  --------------------------------------------------------  IN: 1860 mL / OUT: 2800 mL / NET: -940 mL    05-06-20 @ 07:01  -  05-06-20 @ 19:48  --------------------------------------------------------  IN: 720 mL / OUT: 0 mL / NET: 720 mL      Physical Exam:  	  Gen: Non toxic comfortable appearing   	no jvd   	Pulm: decrease bs  no rales or ronchi   	CV: RRR, S1S2; no rub  	Abd: +BS, soft, nontender/nondistended  	: No suprapubic tenderness  	UE: Warm, no cyanosis  no clubbing,  no edema  	LE: Warm, no cyanosis  no clubbing, LLE  edema + erythema  2+ LLE and 1-2- RLE   	Neuro: alert and oriented. speech coherent     	    LABS/STUDIES  --------------------------------------------------------------------------------              9.3    3.30  >-----------<  204      [05-06-20 @ 10:16]              31.6     137  |  92  |  15  ----------------------------<  127      [05-06-20 @ 10:16]  4.0   |  28  |  5.02        Ca     9.5     [05-06-20 @ 10:16]      Mg     2.2     [05-06-20 @ 10:16]      Phos  4.5     [05-06-20 @ 10:16]    TPro  6.7  /  Alb  3.7  /  TBili  0.2  /  DBili  x   /  AST  15  /  ALT  <5  /  AlkPhos  191  [05-06-20 @ 10:16]              [05-06-20 @ 10:16]        [05-06-20 @ 10:16]    Creatinine Trend:  SCr 5.02 [05-06 @ 10:16]  SCr 4.51 [05-04 @ 09:58]  SCr 6.69 [05-03 @ 17:46]  SCr 5.34 [05-02 @ 17:21]  SCr 3.90 [04-12 @ 07:14]                  Iron 67, TIBC 234, %sat 29      [12-04-19 @ 08:38]  Ferritin 1387      [05-06-20 @ 12:17]  PTH -- (Ca 8.5)      [05-04-20 @ 04:39]   1317  PTH -- (Ca 8.3)      [12-04-19 @ 08:38]   952  PTH -- (Ca 9.6)      [06-17-19 @ 18:06]   177  HbA1c 7.7      [04-08-20 @ 08:03]  TSH 1.78      [11-14-19 @ 09:15]  Lipid: chol 108, TG 76, HDL 57, LDL 36      [11-14-19 @ 09:16]
[facility]   [phone]  RENAL FOLLOW UP NOTE  --------------------------------------------------------------------------------  Chief Complaint:    24 hour events/subjective:    seen earlier. states breathing is improving     PAST HISTORY  --------------------------------------------------------------------------------  No significant changes to PMH, PSH, FHx, SHx, unless otherwise noted    ALLERGIES & MEDICATIONS  --------------------------------------------------------------------------------  Allergies    No Known Allergies    Intolerances      Standing Inpatient Medications  aspirin enteric coated 81 milliGRAM(s) Oral daily  atorvastatin 20 milliGRAM(s) Oral at bedtime  ceFAZolin   IVPB 1000 milliGRAM(s) IV Intermittent every 24 hours  clopidogrel Tablet 75 milliGRAM(s) Oral daily  dextrose 5%. 1000 milliLiter(s) IV Continuous <Continuous>  dextrose 50% Injectable 12.5 Gram(s) IV Push once  dextrose 50% Injectable 25 Gram(s) IV Push once  dextrose 50% Injectable 25 Gram(s) IV Push once  heparin   Injectable 5000 Unit(s) SubCutaneous every 12 hours  insulin glargine Injectable (LANTUS) 14 Unit(s) SubCutaneous at bedtime  insulin lispro (HumaLOG) corrective regimen sliding scale   SubCutaneous <User Schedule>  insulin lispro (HumaLOG) corrective regimen sliding scale   SubCutaneous three times a day before meals  insulin lispro (HumaLOG) corrective regimen sliding scale   SubCutaneous at bedtime  insulin lispro Injectable (HumaLOG) 3 Unit(s) SubCutaneous before breakfast  insulin lispro Injectable (HumaLOG) 3 Unit(s) SubCutaneous before lunch  insulin lispro Injectable (HumaLOG) 3 Unit(s) SubCutaneous before dinner  metoprolol succinate ER 50 milliGRAM(s) Oral daily  sevelamer carbonate 2400 milliGRAM(s) Oral three times a day with meals    PRN Inpatient Medications  acetaminophen   Tablet .. 650 milliGRAM(s) Oral every 6 hours PRN  ALBUTerol    90 MICROgram(s) HFA Inhaler 2 Puff(s) Inhalation every 6 hours PRN  dextrose 40% Gel 15 Gram(s) Oral once PRN  glucagon  Injectable 1 milliGRAM(s) IntraMuscular once PRN  oxycodone    5 mG/acetaminophen 325 mG 1 Tablet(s) Oral every 6 hours PRN      REVIEW OF SYSTEMS  --------------------------------------------------------------------------------    Gen: denies fevers/chills,  CVS: denies chest pain/palpitations  Resp: denies SOB/Cough  GI: Denies N/V/Abd pain  : Denies dysuria    All other systems were reviewed and are negative, except as noted.    VITALS/PHYSICAL EXAM  --------------------------------------------------------------------------------  T(C): 36.7 (05-04-20 @ 17:21), Max: 37 (05-04-20 @ 01:19)  HR: 78 (05-04-20 @ 17:21) (69 - 79)  BP: 158/68 (05-04-20 @ 18:12) (120/72 - 162/77)  RR: 18 (05-04-20 @ 17:21) (17 - 18)  SpO2: 94% (05-04-20 @ 17:21) (93% - 95%)  Wt(kg): --        05-03-20 @ 07:01  -  05-04-20 @ 07:00  --------------------------------------------------------  IN: 1010 mL / OUT: 2000 mL / NET: -990 mL    05-04-20 @ 07:01  -  05-04-20 @ 19:17  --------------------------------------------------------  IN: 840 mL / OUT: 0 mL / NET: 840 mL      Physical Exam:  	  Gen: Non toxic comfortable appearing   	no jvd   	Pulm: decrease bs  mild rales bases  	CV: RRR, S1S2; no rub  	Back: No CVA tenderness  	Abd: +BS, soft, nontender/nondistended  	: No suprapubic tenderness  	UE: Warm, no cyanosis  no clubbing,  no edema  	LE: Warm, no cyanosis  no clubbing, LLE  edema + erythema   	Neuro: alert and oriented. speech coherent   	Skin: Warm, no decrease skin turgor  LLE erythema     	  LABS/STUDIES  --------------------------------------------------------------------------------              9.5    3.14  >-----------<  237      [05-04-20 @ 09:58]              31.2     137  |  92  |  17  ----------------------------<  257      [05-04-20 @ 09:58]  4.5   |  25  |  4.51        Ca     9.5     [05-04-20 @ 09:58]    TPro  7.0  /  Alb  4.1  /  TBili  0.3  /  DBili  x   /  AST  15  /  ALT  5   /  AlkPhos  225  [05-04-20 @ 09:58]          Creatinine Trend:  SCr 4.51 [05-04 @ 09:58]  SCr 6.69 [05-03 @ 17:46]  SCr 5.34 [05-02 @ 17:21]  SCr 3.90 [04-12 @ 07:14]  SCr 5.58 [04-11 @ 06:17]                  Iron 67, TIBC 234, %sat 29      [12-04-19 @ 08:38]  Ferritin 5140      [04-11-20 @ 10:01]  PTH -- (Ca 8.5)      [05-04-20 @ 04:39]   1317  PTH -- (Ca 8.3)      [12-04-19 @ 08:38]   952  PTH -- (Ca 9.6)      [06-17-19 @ 18:06]   177  HbA1c 7.7      [04-08-20 @ 08:03]  TSH 1.78      [11-14-19 @ 09:15]  Lipid: chol 108, TG 76, HDL 57, LDL 36      [11-14-19 @ 09:16]
[facility]   [phone]  DIALYSIS NOTE  Chief Complaint: ESRD/Ongoing hemodialysis requirement.    24 hour events/subjective:      states redness leg is improving       ALLERGIES & MEDICATIONS  --------------------------------------------------------------------------------  Allergies    No Known Allergies    Intolerances      Standing Inpatient Medications  aspirin enteric coated 81 milliGRAM(s) Oral daily  atorvastatin 20 milliGRAM(s) Oral at bedtime  ceFAZolin   IVPB 1000 milliGRAM(s) IV Intermittent every 24 hours  clopidogrel Tablet 75 milliGRAM(s) Oral daily  dextrose 5%. 1000 milliLiter(s) IV Continuous <Continuous>  dextrose 50% Injectable 12.5 Gram(s) IV Push once  dextrose 50% Injectable 25 Gram(s) IV Push once  dextrose 50% Injectable 25 Gram(s) IV Push once  heparin   Injectable 5000 Unit(s) SubCutaneous every 12 hours  insulin glargine Injectable (LANTUS) 14 Unit(s) SubCutaneous at bedtime  insulin lispro (HumaLOG) corrective regimen sliding scale   SubCutaneous <User Schedule>  insulin lispro (HumaLOG) corrective regimen sliding scale   SubCutaneous three times a day before meals  insulin lispro (HumaLOG) corrective regimen sliding scale   SubCutaneous at bedtime  insulin lispro Injectable (HumaLOG) 2 Unit(s) SubCutaneous before dinner  metoprolol succinate ER 50 milliGRAM(s) Oral daily  sevelamer carbonate 2400 milliGRAM(s) Oral three times a day with meals    PRN Inpatient Medications  acetaminophen   Tablet .. 650 milliGRAM(s) Oral every 6 hours PRN  ALBUTerol    90 MICROgram(s) HFA Inhaler 2 Puff(s) Inhalation every 6 hours PRN  dextrose 40% Gel 15 Gram(s) Oral once PRN  glucagon  Injectable 1 milliGRAM(s) IntraMuscular once PRN  oxycodone    5 mG/acetaminophen 325 mG 1 Tablet(s) Oral every 6 hours PRN      REVIEW OF SYSTEMS  --------------------------------------------------------------------------------    no fever or chill  no cp or palp   no sob or cough   no N/V/D/ no abd pain   ext  + edema         VITALS/PHYSICAL EXAM  --------------------------------------------------------------------------------  T(C): 36.7 (05-05-20 @ 19:58), Max: 37 (05-05-20 @ 09:33)  HR: 79 (05-05-20 @ 19:58) (71 - 80)  BP: 150/79 (05-05-20 @ 19:58) (124/69 - 158/80)  RR: 18 (05-05-20 @ 19:58) (18 - 20)  SpO2: 95% (05-05-20 @ 19:58) (93% - 100%)  Wt(kg): --        05-04-20 @ 07:01  -  05-05-20 @ 07:00  --------------------------------------------------------  IN: 840 mL / OUT: 0 mL / NET: 840 mL    05-05-20 @ 07:01  -  05-05-20 @ 20:01  --------------------------------------------------------  IN: 1460 mL / OUT: 2800 mL / NET: -1340 mL      Physical Exam:  		  Gen: Non toxic comfortable appearing   	no jvd   	Pulm: decrease bs  no rales or ronchi   	CV: RRR, S1S2; no rub  	Abd: +BS, soft, nontender/nondistended  	: No suprapubic tenderness  	UE: Warm, no cyanosis  no clubbing,  no edema  	LE: Warm, no cyanosis  no clubbing, LLE  edema + erythema  2+ LLE and 1-2- RLE   	Neuro: alert and oriented. speech coherent   	Skin: Warm, no decrease skin turgor  LLE erythema   	  	    LABS/STUDIES  --------------------------------------------------------------------------------              9.5    3.14  >-----------<  237      [05-04-20 @ 09:58]              31.2     137  |  92  |  17  ----------------------------<  257      [05-04-20 @ 09:58]  4.5   |  25  |  4.51        Ca     9.5     [05-04-20 @ 09:58]    TPro  7.0  /  Alb  4.1  /  TBili  0.3  /  DBili  x   /  AST  15  /  ALT  5   /  AlkPhos  225  [05-04-20 @ 09:58]
Patient is a 64y old  Female who presents with a chief complaint of leg pain (03 May 2020 11:31)      SUBJECTIVE / OVERNIGHT EVENTS: Comfortable without new complaints. L leg less pain.  Review of Systems  chest pain no  palpitations no  sob no  nausea no  headache no    MEDICATIONS  (STANDING):  aspirin enteric coated 81 milliGRAM(s) Oral daily  atorvastatin 20 milliGRAM(s) Oral at bedtime  ceFAZolin   IVPB 1000 milliGRAM(s) IV Intermittent every 24 hours  clopidogrel Tablet 75 milliGRAM(s) Oral daily  dextrose 5%. 1000 milliLiter(s) (50 mL/Hr) IV Continuous <Continuous>  dextrose 50% Injectable 12.5 Gram(s) IV Push once  dextrose 50% Injectable 25 Gram(s) IV Push once  dextrose 50% Injectable 25 Gram(s) IV Push once  heparin   Injectable 5000 Unit(s) SubCutaneous every 12 hours  insulin glargine Injectable (LANTUS) 13 Unit(s) SubCutaneous at bedtime  insulin lispro (HumaLOG) corrective regimen sliding scale   SubCutaneous three times a day before meals  insulin lispro (HumaLOG) corrective regimen sliding scale   SubCutaneous at bedtime  insulin lispro Injectable (HumaLOG) 2 Unit(s) SubCutaneous before breakfast  insulin lispro Injectable (HumaLOG) 2 Unit(s) SubCutaneous before lunch  insulin lispro Injectable (HumaLOG) 2 Unit(s) SubCutaneous before dinner  lisinopril 20 milliGRAM(s) Oral daily  metoprolol succinate ER 50 milliGRAM(s) Oral daily  sevelamer carbonate 2400 milliGRAM(s) Oral three times a day with meals    MEDICATIONS  (PRN):  acetaminophen   Tablet .. 650 milliGRAM(s) Oral every 6 hours PRN Temp greater or equal to 38.5C (101.3F), Mild Pain (1 - 3)  ALBUTerol    90 MICROgram(s) HFA Inhaler 2 Puff(s) Inhalation every 6 hours PRN Shortness of Breath and/or Wheezing  dextrose 40% Gel 15 Gram(s) Oral once PRN Blood Glucose LESS THAN 70 milliGRAM(s)/deciliter  glucagon  Injectable 1 milliGRAM(s) IntraMuscular once PRN Glucose LESS THAN 70 milligrams/deciliter  oxycodone    5 mG/acetaminophen 325 mG 1 Tablet(s) Oral every 6 hours PRN Moderate Pain (4 - 6)      Vital Signs Last 24 Hrs  T(C): 36.7 (03 May 2020 12:03), Max: 37 (03 May 2020 08:36)  T(F): 98.1 (03 May 2020 12:03), Max: 98.6 (03 May 2020 08:36)  HR: 71 (03 May 2020 12:03) (70 - 93)  BP: 136/50 (03 May 2020 12:03) (121/82 - 148/77)  BP(mean): --  RR: 18 (03 May 2020 12:03) (18 - 20)  SpO2: 98% (03 May 2020 12:03) (94% - 98%)    PHYSICAL EXAM:  GENERAL: NAD  HEAD:  Atraumatic, Normocephalic  EYES: EOMI, PERRLA, conjunctiva and sclera clear  NECK: Supple, No JVD  CHEST/LUNG: Clear to auscultation bilaterally; No wheeze  HEART: Regular rate and rhythm; No murmurs, rubs, or gallops  ABDOMEN: Soft, Nontender, Nondistended; Bowel sounds present  EXTREMITIES:  L leg erythema improving   PSYCH: AAOx3  NEUROLOGY: non-focal  SKIN: No rashes or lesions    LABS:                        9.8    4.92  )-----------( 249      ( 02 May 2020 17:21 )             31.8     05-02    138  |  92<L>  |  29<H>  ----------------------------<  364<H>  5.3   |  24  |  5.34<H>    Ca    9.5      02 May 2020 17:21  Phos  6.5     05-02  Mg     2.5     05-02    TPro  6.9  /  Alb  3.9  /  TBili  0.4  /  DBili  x   /  AST  14  /  ALT  10  /  AlkPhos  246<H>  05-02                RADIOLOGY & ADDITIONAL TESTS:    Imaging Personally Reviewed:    Consultant(s) Notes Reviewed:      Care Discussed with Consultants/Other Providers:
Patient is a 64y old  Female who presents with a chief complaint of leg pain (04 May 2020 10:09)      SUBJECTIVE / OVERNIGHT EVENTS: No new complaints. L leg with less pain.  Review of Systems  chest pain no  palpitations no  sob no  nausea no  headache no    MEDICATIONS  (STANDING):  aspirin enteric coated 81 milliGRAM(s) Oral daily  atorvastatin 20 milliGRAM(s) Oral at bedtime  ceFAZolin   IVPB 1000 milliGRAM(s) IV Intermittent every 24 hours  clopidogrel Tablet 75 milliGRAM(s) Oral daily  dextrose 5%. 1000 milliLiter(s) (50 mL/Hr) IV Continuous <Continuous>  dextrose 50% Injectable 12.5 Gram(s) IV Push once  dextrose 50% Injectable 25 Gram(s) IV Push once  dextrose 50% Injectable 25 Gram(s) IV Push once  heparin   Injectable 5000 Unit(s) SubCutaneous every 12 hours  insulin glargine Injectable (LANTUS) 13 Unit(s) SubCutaneous at bedtime  insulin lispro (HumaLOG) corrective regimen sliding scale   SubCutaneous three times a day before meals  insulin lispro (HumaLOG) corrective regimen sliding scale   SubCutaneous at bedtime  insulin lispro Injectable (HumaLOG) 2 Unit(s) SubCutaneous before breakfast  insulin lispro Injectable (HumaLOG) 2 Unit(s) SubCutaneous before lunch  insulin lispro Injectable (HumaLOG) 2 Unit(s) SubCutaneous before dinner  lisinopril 20 milliGRAM(s) Oral daily  metoprolol succinate ER 50 milliGRAM(s) Oral daily  sevelamer carbonate 2400 milliGRAM(s) Oral three times a day with meals    MEDICATIONS  (PRN):  acetaminophen   Tablet .. 650 milliGRAM(s) Oral every 6 hours PRN Temp greater or equal to 38.5C (101.3F), Mild Pain (1 - 3)  ALBUTerol    90 MICROgram(s) HFA Inhaler 2 Puff(s) Inhalation every 6 hours PRN Shortness of Breath and/or Wheezing  dextrose 40% Gel 15 Gram(s) Oral once PRN Blood Glucose LESS THAN 70 milliGRAM(s)/deciliter  glucagon  Injectable 1 milliGRAM(s) IntraMuscular once PRN Glucose LESS THAN 70 milligrams/deciliter  oxycodone    5 mG/acetaminophen 325 mG 1 Tablet(s) Oral every 6 hours PRN Moderate Pain (4 - 6)      Vital Signs Last 24 Hrs  T(C): 36.6 (04 May 2020 07:59), Max: 37.3 (03 May 2020 17:03)  T(F): 97.9 (04 May 2020 07:59), Max: 99.1 (03 May 2020 17:03)  HR: 79 (04 May 2020 07:59) (69 - 79)  BP: 140/74 (04 May 2020 07:59) (120/72 - 159/100)  BP(mean): --  RR: 18 (04 May 2020 07:59) (17 - 18)  SpO2: 94% (04 May 2020 07:59) (93% - 95%)    PHYSICAL EXAM:  GENERAL: NAD, well-developed  HEAD:  Atraumatic, Normocephalic  EYES: EOMI, PERRLA, conjunctiva and sclera clear  NECK: Supple, No JVD  CHEST/LUNG: Clear to auscultation bilaterally; No wheeze  HEART: Regular rate and rhythm; No murmurs, rubs, or gallops  ABDOMEN: Soft, Nontender, Nondistended; Bowel sounds present  EXTREMITIES:  2+L leg edema, improving erythema   PSYCH: AAOx3  NEUROLOGY: non-focal  SKIN: No rashes or lesions    LABS:                        9.5    3.14  )-----------( 237      ( 04 May 2020 09:58 )             31.2     05-04    137  |  92<L>  |  17  ----------------------------<  257<H>  4.5   |  25  |  4.51<H>    Ca    9.5      04 May 2020 09:58  Phos  6.5     05-02  Mg     2.5     05-02    TPro  7.0  /  Alb  4.1  /  TBili  0.3  /  DBili  x   /  AST  15  /  ALT  5<L>  /  AlkPhos  225<H>  05-04              Culture - Blood (collected 03 May 2020 12:51)  Source: .Blood Blood  Preliminary Report (04 May 2020 13:01):    No growth to date.    Culture - Blood (collected 03 May 2020 12:51)  Source: .Blood Blood  Preliminary Report (04 May 2020 13:01):    No growth to date.        RADIOLOGY & ADDITIONAL TESTS:    Imaging Personally Reviewed:    Consultant(s) Notes Reviewed:      Care Discussed with Consultants/Other Providers:
Patient is a 64y old  Female who presents with a chief complaint of leg pain (05 May 2020 15:15)      SUBJECTIVE / OVERNIGHT EVENTS: Comfortable without new complaints.   Review of Systems  chest pain no  palpitations no  sob no  nausea no  headache no    MEDICATIONS  (STANDING):  aspirin enteric coated 81 milliGRAM(s) Oral daily  atorvastatin 20 milliGRAM(s) Oral at bedtime  ceFAZolin   IVPB 1000 milliGRAM(s) IV Intermittent every 24 hours  clopidogrel Tablet 75 milliGRAM(s) Oral daily  dextrose 5%. 1000 milliLiter(s) (50 mL/Hr) IV Continuous <Continuous>  dextrose 50% Injectable 12.5 Gram(s) IV Push once  dextrose 50% Injectable 25 Gram(s) IV Push once  dextrose 50% Injectable 25 Gram(s) IV Push once  heparin   Injectable 5000 Unit(s) SubCutaneous every 12 hours  insulin glargine Injectable (LANTUS) 14 Unit(s) SubCutaneous at bedtime  insulin lispro (HumaLOG) corrective regimen sliding scale   SubCutaneous <User Schedule>  insulin lispro (HumaLOG) corrective regimen sliding scale   SubCutaneous three times a day before meals  insulin lispro (HumaLOG) corrective regimen sliding scale   SubCutaneous at bedtime  insulin lispro Injectable (HumaLOG) 2 Unit(s) SubCutaneous before dinner  metoprolol succinate ER 50 milliGRAM(s) Oral daily  sevelamer carbonate 2400 milliGRAM(s) Oral three times a day with meals    MEDICATIONS  (PRN):  acetaminophen   Tablet .. 650 milliGRAM(s) Oral every 6 hours PRN Temp greater or equal to 38.5C (101.3F), Mild Pain (1 - 3)  ALBUTerol    90 MICROgram(s) HFA Inhaler 2 Puff(s) Inhalation every 6 hours PRN Shortness of Breath and/or Wheezing  dextrose 40% Gel 15 Gram(s) Oral once PRN Blood Glucose LESS THAN 70 milliGRAM(s)/deciliter  glucagon  Injectable 1 milliGRAM(s) IntraMuscular once PRN Glucose LESS THAN 70 milligrams/deciliter  oxycodone    5 mG/acetaminophen 325 mG 1 Tablet(s) Oral every 6 hours PRN Moderate Pain (4 - 6)      Vital Signs Last 24 Hrs  T(C): 37 (05 May 2020 09:33), Max: 37 (05 May 2020 09:33)  T(F): 98.6 (05 May 2020 09:33), Max: 98.6 (05 May 2020 09:33)  HR: 76 (05 May 2020 11:33) (76 - 80)  BP: 155/68 (05 May 2020 11:33) (152/72 - 162/77)  BP(mean): --  RR: 20 (05 May 2020 09:33) (18 - 20)  SpO2: 99% (05 May 2020 11:33) (93% - 99%)    PHYSICAL EXAM:  GENERAL: NAD, well-developed  HEAD:  Atraumatic, Normocephalic  EYES: EOMI, PERRLA, conjunctiva and sclera clear  NECK: Supple, No JVD  CHEST/LUNG: Clear to auscultation bilaterally; No wheeze  HEART: Regular rate and rhythm; No murmurs, rubs, or gallops  ABDOMEN: Soft, Nontender, Nondistended; Bowel sounds present  EXTREMITIES: L leg improving erythema   PSYCH: AAOx3  NEUROLOGY: non-focal  SKIN: No rashes or lesions    LABS:                        9.5    3.14  )-----------( 237      ( 04 May 2020 09:58 )             31.2     05-04    137  |  92<L>  |  17  ----------------------------<  257<H>  4.5   |  25  |  4.51<H>    Ca    9.5      04 May 2020 09:58    TPro  7.0  /  Alb  4.1  /  TBili  0.3  /  DBili  x   /  AST  15  /  ALT  5<L>  /  AlkPhos  225<H>  05-04              Culture - Blood (collected 03 May 2020 12:51)  Source: .Blood Blood  Preliminary Report (04 May 2020 13:01):    No growth to date.    Culture - Blood (collected 03 May 2020 12:51)  Source: .Blood Blood  Preliminary Report (04 May 2020 13:01):    No growth to date.        RADIOLOGY & ADDITIONAL TESTS:    Imaging Personally Reviewed:    Consultant(s) Notes Reviewed:      Care Discussed with Consultants/Other Providers:
Patient is a 64y old  Female who presents with a chief complaint of leg pain (06 May 2020 12:57)      SUBJECTIVE / OVERNIGHT EVENTS: Comfortable without new complaints.   Review of Systems  chest pain no  palpitations no  sob no  nausea no  headache no    MEDICATIONS  (STANDING):  aspirin enteric coated 81 milliGRAM(s) Oral daily  atorvastatin 20 milliGRAM(s) Oral at bedtime  ceFAZolin   IVPB 1000 milliGRAM(s) IV Intermittent every 24 hours  clopidogrel Tablet 75 milliGRAM(s) Oral daily  dextrose 5%. 1000 milliLiter(s) (50 mL/Hr) IV Continuous <Continuous>  dextrose 50% Injectable 12.5 Gram(s) IV Push once  dextrose 50% Injectable 25 Gram(s) IV Push once  dextrose 50% Injectable 25 Gram(s) IV Push once  heparin   Injectable 5000 Unit(s) SubCutaneous every 12 hours  insulin glargine Injectable (LANTUS) 13 Unit(s) SubCutaneous at bedtime  insulin lispro (HumaLOG) corrective regimen sliding scale   SubCutaneous <User Schedule>  insulin lispro (HumaLOG) corrective regimen sliding scale   SubCutaneous three times a day before meals  insulin lispro (HumaLOG) corrective regimen sliding scale   SubCutaneous at bedtime  insulin lispro Injectable (HumaLOG) 2 Unit(s) SubCutaneous before breakfast  insulin lispro Injectable (HumaLOG) 2 Unit(s) SubCutaneous before lunch  insulin lispro Injectable (HumaLOG) 2 Unit(s) SubCutaneous before dinner  metoprolol succinate ER 50 milliGRAM(s) Oral daily  sevelamer carbonate 2400 milliGRAM(s) Oral three times a day with meals    MEDICATIONS  (PRN):  acetaminophen   Tablet .. 650 milliGRAM(s) Oral every 6 hours PRN Temp greater or equal to 38.5C (101.3F), Mild Pain (1 - 3)  ALBUTerol    90 MICROgram(s) HFA Inhaler 2 Puff(s) Inhalation every 6 hours PRN Shortness of Breath and/or Wheezing  dextrose 40% Gel 15 Gram(s) Oral once PRN Blood Glucose LESS THAN 70 milliGRAM(s)/deciliter  glucagon  Injectable 1 milliGRAM(s) IntraMuscular once PRN Glucose LESS THAN 70 milligrams/deciliter  oxycodone    5 mG/acetaminophen 325 mG 1 Tablet(s) Oral every 6 hours PRN Moderate Pain (4 - 6)      Vital Signs Last 24 Hrs  T(C): 36.8 (06 May 2020 08:28), Max: 36.9 (06 May 2020 06:19)  T(F): 98.3 (06 May 2020 08:28), Max: 98.5 (06 May 2020 06:19)  HR: 81 (06 May 2020 08:28) (77 - 81)  BP: 122/75 (06 May 2020 08:28) (122/75 - 150/90)  BP(mean): --  RR: 18 (06 May 2020 08:28) (18 - 18)  SpO2: 95% (06 May 2020 08:28) (95% - 98%)    PHYSICAL EXAM:  GENERAL: NAD, well-developed  HEAD:  Atraumatic, Normocephalic  EYES: EOMI, PERRLA, conjunctiva and sclera clear  NECK: Supple, No JVD  CHEST/LUNG: Clear to auscultation bilaterally; No wheeze  HEART: Regular rate and rhythm; No murmurs, rubs, or gallops  ABDOMEN: Soft, Nontender, Nondistended; Bowel sounds present  EXTREMITIES:  L leg with less erythema   PSYCH: AAOx3  NEUROLOGY: non-focal  SKIN: No rashes or lesions    LABS:                        9.3    3.30  )-----------( 204      ( 06 May 2020 10:16 )             31.6     05-06    137  |  92<L>  |  15  ----------------------------<  127<H>  4.0   |  28  |  5.02<H>    Ca    9.5      06 May 2020 10:16  Phos  4.5     05-06  Mg     2.2     05-06    TPro  6.7  /  Alb  3.7  /  TBili  0.2  /  DBili  x   /  AST  15  /  ALT  <5<L>  /  AlkPhos  191<H>  05-06      CARDIAC MARKERS ( 06 May 2020 10:16 )  x     / x     / 162 U/L / x     / x                RADIOLOGY & ADDITIONAL TESTS:    Imaging Personally Reviewed:    Consultant(s) Notes Reviewed:      Care Discussed with Consultants/Other Providers:
Patient is a 64y old  Female who presents with a chief complaint of leg pain (07 May 2020 12:21)      SUBJECTIVE / OVERNIGHT EVENTS:  Review of Systems  chest pain no  palpitations no  sob no  nausea no  headache no    MEDICATIONS  (STANDING):  aspirin enteric coated 81 milliGRAM(s) Oral daily  atorvastatin 20 milliGRAM(s) Oral at bedtime  ceFAZolin   IVPB 1000 milliGRAM(s) IV Intermittent every 24 hours  clopidogrel Tablet 75 milliGRAM(s) Oral daily  dextrose 5%. 1000 milliLiter(s) (50 mL/Hr) IV Continuous <Continuous>  dextrose 50% Injectable 12.5 Gram(s) IV Push once  dextrose 50% Injectable 25 Gram(s) IV Push once  dextrose 50% Injectable 25 Gram(s) IV Push once  heparin   Injectable 5000 Unit(s) SubCutaneous every 12 hours  insulin glargine Injectable (LANTUS) 10 Unit(s) SubCutaneous at bedtime  insulin lispro (HumaLOG) corrective regimen sliding scale   SubCutaneous <User Schedule>  insulin lispro (HumaLOG) corrective regimen sliding scale   SubCutaneous three times a day before meals  insulin lispro (HumaLOG) corrective regimen sliding scale   SubCutaneous at bedtime  insulin lispro Injectable (HumaLOG) 2 Unit(s) SubCutaneous before breakfast  insulin lispro Injectable (HumaLOG) 2 Unit(s) SubCutaneous before lunch  insulin lispro Injectable (HumaLOG) 2 Unit(s) SubCutaneous before dinner  metoprolol succinate ER 50 milliGRAM(s) Oral daily  sevelamer carbonate 2400 milliGRAM(s) Oral three times a day with meals  silver sulfADIAZINE 1% Cream 1 Application(s) Topical daily    MEDICATIONS  (PRN):  acetaminophen   Tablet .. 650 milliGRAM(s) Oral every 6 hours PRN Temp greater or equal to 38.5C (101.3F), Mild Pain (1 - 3)  ALBUTerol    90 MICROgram(s) HFA Inhaler 2 Puff(s) Inhalation every 6 hours PRN Shortness of Breath and/or Wheezing  dextrose 40% Gel 15 Gram(s) Oral once PRN Blood Glucose LESS THAN 70 milliGRAM(s)/deciliter  glucagon  Injectable 1 milliGRAM(s) IntraMuscular once PRN Glucose LESS THAN 70 milligrams/deciliter  oxycodone    5 mG/acetaminophen 325 mG 1 Tablet(s) Oral every 6 hours PRN Moderate Pain (4 - 6)      Vital Signs Last 24 Hrs  T(C): 36.8 (07 May 2020 14:50), Max: 36.9 (06 May 2020 17:03)  T(F): 98.2 (07 May 2020 14:50), Max: 98.5 (06 May 2020 17:03)  HR: 76 (07 May 2020 14:50) (75 - 84)  BP: 151/56 (07 May 2020 14:50) (125/73 - 151/56)  BP(mean): --  RR: 18 (07 May 2020 14:50) (18 - 18)  SpO2: 98% (07 May 2020 14:50) (95% - 98%)    PHYSICAL EXAM:  GENERAL: NAD, well-developed  HEAD:  Atraumatic, Normocephalic  EYES: EOMI, PERRLA, conjunctiva and sclera clear  NECK: Supple, No JVD  CHEST/LUNG: Clear to auscultation bilaterally; No wheeze  HEART: Regular rate and rhythm; No murmurs, rubs, or gallops  ABDOMEN: Soft, Nontender, Nondistended; Bowel sounds present  EXTREMITIES:  2+ Peripheral Pulses, No clubbing, cyanosis, or edema  PSYCH: AAOx3  NEUROLOGY: non-focal  SKIN: No rashes or lesions    LABS:                        9.9    4.98  )-----------( 202      ( 07 May 2020 08:24 )             33.2     05-07    138  |  97  |  23  ----------------------------<  45<LL>  4.3   |  24  |  6.29<H>    Ca    9.5      07 May 2020 08:24  Phos  4.5     05-06  Mg     2.2     05-06    TPro  6.8  /  Alb  3.8  /  TBili  0.2  /  DBili  x   /  AST  12  /  ALT  <5<L>  /  AlkPhos  194<H>  05-07      CARDIAC MARKERS ( 06 May 2020 10:16 )  x     / x     / 162 U/L / x     / x                RADIOLOGY & ADDITIONAL TESTS:    Imaging Personally Reviewed:    Consultant(s) Notes Reviewed:      Care Discussed with Consultants/Other Providers:
[facility]   [phone]  RENAL FOLLOW UP NOTE  --------------------------------------------------------------------------------  Chief Complaint:    24 hour events/subjective:    seen earlier. states feels well no sob and no leg pain     PAST HISTORY  --------------------------------------------------------------------------------  No significant changes to PMH, PSH, FHx, SHx, unless otherwise noted    ALLERGIES & MEDICATIONS  --------------------------------------------------------------------------------  Allergies    No Known Allergies    Intolerances      Standing Inpatient Medications  aspirin enteric coated 81 milliGRAM(s) Oral daily  atorvastatin 20 milliGRAM(s) Oral at bedtime  ceFAZolin   IVPB 1000 milliGRAM(s) IV Intermittent every 24 hours  clopidogrel Tablet 75 milliGRAM(s) Oral daily  dextrose 5%. 1000 milliLiter(s) IV Continuous <Continuous>  dextrose 50% Injectable 12.5 Gram(s) IV Push once  dextrose 50% Injectable 25 Gram(s) IV Push once  dextrose 50% Injectable 25 Gram(s) IV Push once  heparin   Injectable 5000 Unit(s) SubCutaneous every 12 hours  insulin glargine Injectable (LANTUS) 10 Unit(s) SubCutaneous at bedtime  insulin lispro (HumaLOG) corrective regimen sliding scale   SubCutaneous <User Schedule>  insulin lispro (HumaLOG) corrective regimen sliding scale   SubCutaneous three times a day before meals  insulin lispro (HumaLOG) corrective regimen sliding scale   SubCutaneous at bedtime  insulin lispro Injectable (HumaLOG) 2 Unit(s) SubCutaneous before breakfast  insulin lispro Injectable (HumaLOG) 2 Unit(s) SubCutaneous before lunch  insulin lispro Injectable (HumaLOG) 2 Unit(s) SubCutaneous before dinner  metoprolol succinate ER 50 milliGRAM(s) Oral daily  sevelamer carbonate 2400 milliGRAM(s) Oral three times a day with meals  silver sulfADIAZINE 1% Cream 1 Application(s) Topical daily    PRN Inpatient Medications  acetaminophen   Tablet .. 650 milliGRAM(s) Oral every 6 hours PRN  ALBUTerol    90 MICROgram(s) HFA Inhaler 2 Puff(s) Inhalation every 6 hours PRN  dextrose 40% Gel 15 Gram(s) Oral once PRN  glucagon  Injectable 1 milliGRAM(s) IntraMuscular once PRN  oxycodone    5 mG/acetaminophen 325 mG 1 Tablet(s) Oral every 6 hours PRN      REVIEW OF SYSTEMS  --------------------------------------------------------------------------------    Gen: denies fevers/chills,  CVS: denies chest pain/palpitations  Resp: denies SOB/Cough  GI: Denies N/V/Abd pain  : Denies dysuria    All other systems were reviewed and are negative, except as noted.    VITALS/PHYSICAL EXAM  --------------------------------------------------------------------------------  T(C): 36.8 (05-07-20 @ 14:50), Max: 36.8 (05-07-20 @ 05:07)  HR: 76 (05-07-20 @ 14:50) (75 - 82)  BP: 151/56 (05-07-20 @ 14:50) (125/73 - 151/56)  RR: 18 (05-07-20 @ 14:50) (18 - 18)  SpO2: 98% (05-07-20 @ 14:50) (96% - 98%)  Wt(kg): --        05-06-20 @ 07:01  -  05-07-20 @ 07:00  --------------------------------------------------------  IN: 1120 mL / OUT: 0 mL / NET: 1120 mL    05-07-20 @ 07:01  -  05-07-20 @ 18:38  --------------------------------------------------------  IN: 840 mL / OUT: 0 mL / NET: 840 mL      Physical Exam:  	    Gen: Non toxic comfortable appearing   	no jvd   	Pulm: decrease bs  no rales or ronchi   	CV: RRR, S1S2; no rub  	Abd: +BS, soft, nontender/nondistended  	: No suprapubic tenderness  	UE: Warm, no cyanosis  no clubbing,  no edema  	LE: Warm, no cyanosis  no clubbing, LLE  edema + erythema  2+ LLE and 1-2- RLE   	Neuro: alert and oriented. speech coherent   	    LABS/STUDIES  --------------------------------------------------------------------------------              9.9    4.98  >-----------<  202      [05-07-20 @ 08:24]              33.2     138  |  97  |  23  ----------------------------<  45      [05-07-20 @ 08:24]  4.3   |  24  |  6.29        Ca     9.5     [05-07-20 @ 08:24]      Mg     2.2     [05-06-20 @ 10:16]      Phos  4.5     [05-06-20 @ 10:16]    TPro  6.8  /  Alb  3.8  /  TBili  0.2  /  DBili  x   /  AST  12  /  ALT  <5  /  AlkPhos  194  [05-07-20 @ 08:24]              [05-06-20 @ 10:16]        [05-06-20 @ 10:16]    Creatinine Trend:  SCr 6.29 [05-07 @ 08:24]  SCr 5.02 [05-06 @ 10:16]  SCr 4.51 [05-04 @ 09:58]  SCr 6.69 [05-03 @ 17:46]  SCr 5.34 [05-02 @ 17:21]                  Iron 67, TIBC 234, %sat 29      [12-04-19 @ 08:38]  Ferritin 1387      [05-06-20 @ 12:17]  PTH -- (Ca 8.5)      [05-04-20 @ 04:39]   1317  PTH -- (Ca 8.3)      [12-04-19 @ 08:38]   952  PTH -- (Ca 9.6)      [06-17-19 @ 18:06]   177  HbA1c 7.7      [04-08-20 @ 08:03]  TSH 1.78      [11-14-19 @ 09:15]  Lipid: chol 108, TG 76, HDL 57, LDL 36      [11-14-19 @ 09:16]
Vascular Surgery Progress Note     S: No events overnight. Patient resting comfortably this am. L AVF with silvadene and gauze    MEDICATIONS  (STANDING):  aspirin enteric coated 81 milliGRAM(s) Oral daily  atorvastatin 20 milliGRAM(s) Oral at bedtime  ceFAZolin   IVPB 1000 milliGRAM(s) IV Intermittent every 24 hours  clopidogrel Tablet 75 milliGRAM(s) Oral daily  dextrose 5%. 1000 milliLiter(s) (50 mL/Hr) IV Continuous <Continuous>  dextrose 50% Injectable 12.5 Gram(s) IV Push once  dextrose 50% Injectable 25 Gram(s) IV Push once  dextrose 50% Injectable 25 Gram(s) IV Push once  heparin   Injectable 5000 Unit(s) SubCutaneous every 12 hours  insulin glargine Injectable (LANTUS) 13 Unit(s) SubCutaneous at bedtime  insulin lispro (HumaLOG) corrective regimen sliding scale   SubCutaneous <User Schedule>  insulin lispro (HumaLOG) corrective regimen sliding scale   SubCutaneous three times a day before meals  insulin lispro (HumaLOG) corrective regimen sliding scale   SubCutaneous at bedtime  insulin lispro Injectable (HumaLOG) 2 Unit(s) SubCutaneous before breakfast  insulin lispro Injectable (HumaLOG) 2 Unit(s) SubCutaneous before lunch  insulin lispro Injectable (HumaLOG) 2 Unit(s) SubCutaneous before dinner  metoprolol succinate ER 50 milliGRAM(s) Oral daily  sevelamer carbonate 2400 milliGRAM(s) Oral three times a day with meals  silver sulfADIAZINE 1% Cream 1 Application(s) Topical daily    MEDICATIONS  (PRN):  acetaminophen   Tablet .. 650 milliGRAM(s) Oral every 6 hours PRN Temp greater or equal to 38.5C (101.3F), Mild Pain (1 - 3)  ALBUTerol    90 MICROgram(s) HFA Inhaler 2 Puff(s) Inhalation every 6 hours PRN Shortness of Breath and/or Wheezing  dextrose 40% Gel 15 Gram(s) Oral once PRN Blood Glucose LESS THAN 70 milliGRAM(s)/deciliter  glucagon  Injectable 1 milliGRAM(s) IntraMuscular once PRN Glucose LESS THAN 70 milligrams/deciliter  oxycodone    5 mG/acetaminophen 325 mG 1 Tablet(s) Oral every 6 hours PRN Moderate Pain (4 - 6)      Physical Exam:    Vital Signs Last 24 Hrs  T(C): 36.9 (06 May 2020 17:03), Max: 36.9 (06 May 2020 06:19)  T(F): 98.5 (06 May 2020 17:03), Max: 98.5 (06 May 2020 06:19)  HR: 84 (06 May 2020 17:03) (77 - 84)  BP: 150/79 (06 May 2020 17:03) (122/75 - 150/79)  BP(mean): --  RR: 18 (06 May 2020 17:03) (18 - 18)  SpO2: 95% (06 May 2020 17:03) (95% - 98%)    05-05-20 @ 07:01  -  05-06-20 @ 07:00  --------------------------------------------------------  IN: 1860 mL / OUT: 2800 mL / NET: -940 mL    05-06-20 @ 07:01  -  05-07-20 @ 05:17  --------------------------------------------------------  IN: 720 mL / OUT: 0 mL / NET: 720 mL        Gen: NAD, alert  LUE: superficial ulceration    LABS:                        9.3    3.30  )-----------( 204      ( 06 May 2020 10:16 )             31.6     05-06    137  |  92<L>  |  15  ----------------------------<  127<H>  4.0   |  28  |  5.02<H>    Ca    9.5      06 May 2020 10:16  Phos  4.5     05-06  Mg     2.2     05-06    TPro  6.7  /  Alb  3.7  /  TBili  0.2  /  DBili  x   /  AST  15  /  ALT  <5<L>  /  AlkPhos  191<H>  05-06

## 2020-05-07 NOTE — PROGRESS NOTE ADULT - ASSESSMENT
64 female with T1DM uncontrolled x 46 years with PVD s/p b/l fem-pop, CVA/TIA, CAD with MIx8 s/p PCI with stenting, and ESRD on HD here with 2 days of L leg redness with swelling, endocrine consulted for T1D mgt. Glucose values tightly controlled. Pt at high risk for hypoglycemia w/ESRD. BG goal (100-200mg/dl).
64 F PMH CAD Sp PTCA w/ stents, CHF, COPD, CVA, DMT1, ESRD on HD (M,Tu,Th,Sa), Gout, HLD, PVD, Subclavian Stenosis (L) s/p stent. PSH s/p ASD Repair, s/p idania, s/p fem-pop bypass, recurrent admission for hyper/hypoglycemia/DKA, p/w a 1 day history of worsening LLE swelling, erythema, and warmth.  recent admission april 12 dc after covid 19 + . over the past day, she noticed that her left leg has become   more swollen and red, states that it is diffusely painful with 10/10 intensity. Pt also has RLE erythema with mild tenderness, but less swelling.     1- ESRD  2- hyperkalemia  3- DM   4- CAD  5- SHPT  6- LE cellulitis       hd am   renvela one tab with meals   insulin to cont   ancef 1 g iv qd  off  midodrine   toprol xl 50 mg daily   increase fluid removal with hd
64 F PMH CAD Sp PTCA w/ stents, CHF, COPD, CVA, DMT1, ESRD on HD (M,Tu,Th,Sa), Gout, HLD, PVD, Subclavian Stenosis (L) s/p stent. PSH s/p ASD Repair, s/p idania, s/p fem-pop bypass, recurrent admission for hyper/hypoglycemia/DKA, p/w a 1 day history of worsening LLE swelling, erythema, and warmth.  recent admission april 12 dc after covid 19 + . over the past day, she noticed that her left leg has become   more swollen and red, states that it is diffusely painful with 10/10 intensity. Pt also has RLE erythema with mild tenderness, but less swelling.     1- ESRD  2- hyperkalemia  3- DM   4- CAD  5- SHPT  6- LE cellulitis     HD f 160 3.5 hr 2.3 liter bfr 400 dfr 600 2 k bath   cont toprol
64 F PMH CAD Sp PTCA w/ stents, CHF, COPD, CVA, DMT1, ESRD on HD (M,Tu,Th,Sa), Gout, HLD, PVD, Subclavian Stenosis (L) s/p stent. PSH s/p ASD Repair, s/p idania, s/p fem-pop bypass, recurrent admission for hyper/hypoglycemia/DKA, p/w a 1 day history of worsening LLE swelling, erythema, and warmth.  recent admission april 12 dc after covid 19 + . over the past day, she noticed that her left leg has become   more swollen and red, states that it is diffusely painful with 10/10 intensity. Pt also has RLE erythema with mild tenderness, but less swelling.     1- ESRD  2- hyperkalemia  3- DM   4- CAD  5- SHPT  6- LE cellulitis     hd am   renvela one tab with meals   insulin to cont   ancef 1 g iv qd   toprol xl 50 mg daily   increase fluid removal with hd   vascular consult for avf site scab -- called
64 F PMH CAD Sp PTCA w/ stents, CHF, COPD, CVA, DMT1, ESRD on HD (M,Tu,Th,Sa), Gout, HLD, PVD, Subclavian Stenosis (L) s/p stent. PSH s/p ASD Repair, s/p idania, s/p fem-pop bypass, recurrent admission for hyper/hypoglycemia/DKA, p/w a 1 day history of worsening LLE swelling, erythema, and warmth.  recent admission april 12 dc after covid 19 + . over the past day, she noticed that her left leg has become   more swollen and red, states that it is diffusely painful with 10/10 intensity. Pt also has RLE erythema with mild tenderness, but less swelling.     1- ESRD  2- hyperkalemia  3- DM   4- CAD  5- SHPT  6- LE cellulitis     hd f 160 3.5 hr 2 k bath bfr 400 dfr 600   cont abx
64 f with    L leg cellulitis  - antibiotics  - ID evaluation noted      Pneumonia due to 2019 novel coronavirus.  - resolved.   -airborne + contact isolation  -Supplemental oxygen as required.   -ID follow    COPD  -MDI albuterol prn    CAD  -cardiology follow.    -c/w aspirin, plavix, statin, bb. Decrease Losartan    -f/u further cards recs    ESRD on hemodialysis.   -c/w sevelemer  -discontinue midodrine  -renal evaluation Dr. Schmidt.     Type 2 diabetes mellitus with hyperglycemia, with long-term current use of insulin.  -Lantus 13/humalog 2 premeal  -endo evaluation    Advanced care planning/counseling discussion.  - pt was DNR/ DNI during previous admission.   - trial of IV fluids/abx acceptable. Will clarify with .     Prophylactic measure.   - Heparin    Pierce Viera MD pager [phone]
64 f with    L leg cellulitis  - antibiotics  - ID evaluation noted      Pneumonia due to 2019 novel coronavirus.  - resolved.   -airborne + contact isolation  -Supplemental oxygen as required.   -ID follow    COPD  -MDI albuterol prn    CAD  -cardiology follow.    -c/w aspirin, plavix, statin, bb. Decrease Losartan    -f/u further cards recs    ESRD on hemodialysis.   -c/w sevelemer  -discontinue midodrine  -renal evaluation Dr. Schmidt.     Type 2 diabetes mellitus with hyperglycemia, with long-term current use of insulin.  -Lantus 13/humalog 2 premeal  -endo evaluation    Advanced care planning/counseling discussion.  - pt was DNR/ DNI during previous admission.   - trial of IV fluids/abx acceptable. Will clarify with .     Prophylactic measure.   - Heparin    Pierce Viera MD pager [phone]
64 f with    L leg cellulitis  - antibiotics  - ID follow      Pneumonia due to 2019 novel coronavirus.  - resolved.   -airborne + contact isolation  -Supplemental oxygen as required.   -ID follow    COPD  -MDI albuterol prn    CAD  -cardiology follow.    -c/w aspirin, plavix, statin, bb. Decrease Losartan    -f/u further cards recs    ESRD on hemodialysis.   -c/w sevelemer  -discontinue midodrine  -renal follow Dr. Schmidt.     Type 2 diabetes mellitus with hyperglycemia, with long-term current use of insulin.  -Lantus 13/humalog 2 premeal  -endo evaluation    Advanced care planning/counseling discussion.  - pt was DNR/ DNI during previous admission.   - trial of IV fluids/abx acceptable.     AVF  - Vascular evaluation  - US AV fistula pending to r/o Pseudoaneurysm.     Prophylactic measure.   - Heparin    DCP home.      Pierce Viera MD pager [phone]
64 f with    L leg cellulitis  - antibiotics  - ID follow noted      Pneumonia due to 2019 novel coronavirus.  - resolved.   -airborne + contact isolation  -Supplemental oxygen as required.   -ID follow    COPD  -MDI albuterol prn    CAD  -cardiology follow.    -c/w aspirin, plavix, statin, bb. Decrease Losartan    -f/u further cards recs    ESRD on hemodialysis.   -c/w sevelemer  -discontinue midodrine  -renal evaluation Dr. Schmidt.     Type 2 diabetes mellitus with hyperglycemia, with long-term current use of insulin.  -Lantus 13/humalog 2 premeal  -endo evaluation    Advanced care planning/counseling discussion.  - pt was DNR/ DNI during previous admission.   - trial of IV fluids/abx acceptable.     Prophylactic measure.   - Heparin    DCP home after HD .     Pierce Viera MD pager [phone]
64 f with    L leg cellulitis  - antibiotics  - ID follow noted      Pneumonia due to 2019 novel coronavirus.  - resolved.   -airborne + contact isolation  -Supplemental oxygen as required.   -ID follow    COPD  -MDI albuterol prn    CAD  -cardiology follow.    -c/w aspirin, plavix, statin, bb. Decrease Losartan    -f/u further cards recs    ESRD on hemodialysis.   -c/w sevelemer  -discontinue midodrine  -renal follow Dr. Schmidt.     Type 2 diabetes mellitus with hyperglycemia, with long-term current use of insulin.  -Lantus 13/humalog 2 premeal  -endo evaluation    Advanced care planning/counseling discussion.  - pt was DNR/ DNI during previous admission.   - trial of IV fluids/abx acceptable.     Prophylactic measure.   - Heparin    DCP home in am after HD if stable .     Pierce Viera MD pager [phone]
64 female with T1DM uncontrolled x 46 years with PVD s/p b/l fem-pop, CVA/TIA, CAD with MIx8 s/p PCI with stenting, and ESRD on HD here with 2 days of L leg redness with swelling, endocrine consulted for T1D management. Fasting hypoglycemia this AM despite lowered insulin dosing. Fair PO intake while inpatient w/lower insulin requirements from baseline. Discussed adjusting basal insulin at discharge with patient. BG goal (100-200mg/dl).
64 female with T1DM uncontrolled x 46 years with PVD s/p b/l fem-pop, CVA/TIA, CAD with MIx8 s/p PCI with stenting, and ESRD on HD here with 2 days of L leg redness with swelling, endocrine consulted for T1D management. Hypoglycemic at lunchtime due to poor PO intake at breakfast time. On abx for cellulitis, likely to be discharged home tomorrow. BG goal (100-180mg/dl).
64F CAD Sp PTCA w/ stents, CHF, COPD, CVA, DMT1, ESRD on HD noted to have ulceration over L AVF. Wound with silvadene and gauze    -please obtain duplex of the fistula to rule out pseudoaneurysm  vascular surgery will follow    page [phone] with vascular questions  Gaby Pacheco, PGY-4
64y female with hx CAD Sp PTCA w/ stents, CHF, COPD, CVA, DMT, ESRD on HD, Gout, HLD, PVD, Subclavian Stenosis (L) s/p stent, s/p ASD Repair, s/p idania, s/p fem-pop bypass, recurrent admission for hyper/hypoglycemia/DKA, Pt now p/w worsening LLE swelling, erythema, and warmth for 1 day, diffusely painful. She denies any fevers or chills  suspect acute cellulitis LLE, most likely beta-hemolytic strep as possible likely pathogen  Her exam is also suggestive of a component of  venous stasis dermatitis  PLAN:  Ancef renally dosed  keep leg elevated  f/u cultures  if cont to improve switch to oral ceph renally dosed in next 1-2 days  Additional w/u as indicated
64y female with hx CAD Sp PTCA w/ stents, CHF, COPD, CVA, DMT, ESRD on HD, Gout, HLD, PVD, Subclavian Stenosis (L) s/p stent, s/p ASD Repair, s/p idania, s/p fem-pop bypass, recurrent admission for hyper/hypoglycemia/DKA, Pt now p/w worsening LLE swelling, erythema, and warmth for 1 day, diffusely painful. She denies any fevers or chills  suspect acute cellulitis LLE, most likely beta-hemolytic strep as possible likely pathogen  Her exam is also suggestive of a component of  venous stasis dermatitis/lipodermatosclerosis  No fever, pain improved, and blood cultures are negative so far  PLAN:  Ancef renally dosed  keep leg elevated  f/u cultures  Can change to cephalexin 500 po BID at any point to complete 5 days of antibiotics  Additional w/u as indicated
64y female with hx CAD Sp PTCA w/ stents, CHF, COPD, CVA, DMT, ESRD on HD, Gout, HLD, PVD, Subclavian Stenosis (L) s/p stent, s/p ASD Repair, s/p idania, s/p fem-pop bypass, recurrent admission for hyper/hypoglycemia/DKA, Pt now p/w worsening LLE swelling, erythema, and warmth for 1 day, diffusely painful. She denies any fevers or chills  suspect acute cellulitis LLE, most likely beta-hemolytic strep as possible likely pathogen  Her exam is also suggestive of a component of  venous stasis dermatitis/lipodermatosclerosis  No fever, pain improved, and blood cultures are negative so far  PLAN:  Ancef renally dosed  keep leg elevated  f/u cultures  Can change to cephalexin 500 po BID at any point to complete 5 days of antibiotics  Additional w/u as indicated

## 2020-05-17 ENCOUNTER — INPATIENT (INPATIENT)
Facility: HOSPITAL | Age: 65
LOS: 2 days | Discharge: HOME | DRG: 252 | End: 2020-05-20
Attending: SURGERY | Admitting: SURGERY
Payer: MEDICARE

## 2020-05-17 ENCOUNTER — TRANSCRIPTION ENCOUNTER (OUTPATIENT)
Age: 65
End: 2020-05-17

## 2020-05-17 VITALS
SYSTOLIC BLOOD PRESSURE: 149 MMHG | TEMPERATURE: 98 F | RESPIRATION RATE: 17 BRPM | HEART RATE: 77 BPM | DIASTOLIC BLOOD PRESSURE: 86 MMHG | OXYGEN SATURATION: 99 %

## 2020-05-17 DIAGNOSIS — Z98.89 OTHER SPECIFIED POSTPROCEDURAL STATES: Chronic | ICD-10-CM

## 2020-05-17 DIAGNOSIS — T82.7XXA INFECTION AND INFLAMMATORY REACTION DUE TO OTHER CARDIAC AND VASCULAR DEVICES, IMPLANTS AND GRAFTS, INITIAL ENCOUNTER: ICD-10-CM

## 2020-05-17 LAB
ALBUMIN SERPL ELPH-MCNC: 4.2 G/DL — SIGNIFICANT CHANGE UP (ref 3.3–5)
ALP SERPL-CCNC: 267 U/L — HIGH (ref 40–120)
ALT FLD-CCNC: <5 U/L — LOW (ref 10–45)
ANION GAP SERPL CALC-SCNC: 19 MMOL/L — HIGH (ref 5–17)
APTT BLD: 33.7 SEC — SIGNIFICANT CHANGE UP (ref 27.5–36.3)
AST SERPL-CCNC: 18 U/L — SIGNIFICANT CHANGE UP (ref 10–40)
BASE EXCESS BLDV CALC-SCNC: 3.9 MMOL/L — HIGH (ref -2–2)
BASE EXCESS BLDV CALC-SCNC: 6.8 MMOL/L — HIGH (ref -2–2)
BASOPHILS # BLD AUTO: 0.07 K/UL — SIGNIFICANT CHANGE UP (ref 0–0.2)
BASOPHILS NFR BLD AUTO: 1.3 % — SIGNIFICANT CHANGE UP (ref 0–2)
BILIRUB SERPL-MCNC: 0.3 MG/DL — SIGNIFICANT CHANGE UP (ref 0.2–1.2)
BLD GP AB SCN SERPL QL: NEGATIVE — SIGNIFICANT CHANGE UP
BUN SERPL-MCNC: 16 MG/DL — SIGNIFICANT CHANGE UP (ref 7–23)
CA-I SERPL-SCNC: 1.13 MMOL/L — SIGNIFICANT CHANGE UP (ref 1.12–1.3)
CA-I SERPL-SCNC: 1.2 MMOL/L — SIGNIFICANT CHANGE UP (ref 1.12–1.3)
CALCIUM SERPL-MCNC: 9.8 MG/DL — SIGNIFICANT CHANGE UP (ref 8.4–10.5)
CHLORIDE BLDV-SCNC: 96 MMOL/L — SIGNIFICANT CHANGE UP (ref 96–108)
CHLORIDE BLDV-SCNC: 98 MMOL/L — SIGNIFICANT CHANGE UP (ref 96–108)
CHLORIDE SERPL-SCNC: 93 MMOL/L — LOW (ref 96–108)
CO2 BLDV-SCNC: 33 MMOL/L — HIGH (ref 22–30)
CO2 BLDV-SCNC: 35 MMOL/L — HIGH (ref 22–30)
CO2 SERPL-SCNC: 29 MMOL/L — SIGNIFICANT CHANGE UP (ref 22–31)
CREAT SERPL-MCNC: 4.12 MG/DL — HIGH (ref 0.5–1.3)
EOSINOPHIL # BLD AUTO: 0.14 K/UL — SIGNIFICANT CHANGE UP (ref 0–0.5)
EOSINOPHIL NFR BLD AUTO: 2.5 % — SIGNIFICANT CHANGE UP (ref 0–6)
GAS PNL BLDV: 137 MMOL/L — SIGNIFICANT CHANGE UP (ref 135–145)
GAS PNL BLDV: 139 MMOL/L — SIGNIFICANT CHANGE UP (ref 135–145)
GAS PNL BLDV: SIGNIFICANT CHANGE UP
GLUCOSE BLDC GLUCOMTR-MCNC: 119 MG/DL — HIGH (ref 70–99)
GLUCOSE BLDC GLUCOMTR-MCNC: 187 MG/DL — HIGH (ref 70–99)
GLUCOSE BLDV-MCNC: 232 MG/DL — HIGH (ref 70–99)
GLUCOSE BLDV-MCNC: 268 MG/DL — HIGH (ref 70–99)
GLUCOSE SERPL-MCNC: 278 MG/DL — HIGH (ref 70–99)
HCO3 BLDV-SCNC: 31 MMOL/L — HIGH (ref 21–29)
HCO3 BLDV-SCNC: 33 MMOL/L — HIGH (ref 21–29)
HCT VFR BLD CALC: 34.7 % — SIGNIFICANT CHANGE UP (ref 34.5–45)
HCT VFR BLDA CALC: 33 % — LOW (ref 39–50)
HCT VFR BLDA CALC: 33 % — LOW (ref 39–50)
HGB BLD CALC-MCNC: 10.7 G/DL — LOW (ref 11.5–15.5)
HGB BLD CALC-MCNC: 10.7 G/DL — LOW (ref 11.5–15.5)
HGB BLD-MCNC: 10.2 G/DL — LOW (ref 11.5–15.5)
IMM GRANULOCYTES NFR BLD AUTO: 0.4 % — SIGNIFICANT CHANGE UP (ref 0–1.5)
INR BLD: 1.14 RATIO — SIGNIFICANT CHANGE UP (ref 0.88–1.16)
LACTATE BLDV-MCNC: 2.1 MMOL/L — HIGH (ref 0.7–2)
LACTATE BLDV-MCNC: 2.3 MMOL/L — HIGH (ref 0.7–2)
LYMPHOCYTES # BLD AUTO: 0.77 K/UL — LOW (ref 1–3.3)
LYMPHOCYTES # BLD AUTO: 14 % — SIGNIFICANT CHANGE UP (ref 13–44)
MANUAL SMEAR VERIFICATION: SIGNIFICANT CHANGE UP
MCHC RBC-ENTMCNC: 29.4 GM/DL — LOW (ref 32–36)
MCHC RBC-ENTMCNC: 32.9 PG — SIGNIFICANT CHANGE UP (ref 27–34)
MCV RBC AUTO: 111.9 FL — HIGH (ref 80–100)
MONOCYTES # BLD AUTO: 0.52 K/UL — SIGNIFICANT CHANGE UP (ref 0–0.9)
MONOCYTES NFR BLD AUTO: 9.5 % — SIGNIFICANT CHANGE UP (ref 2–14)
NEUTROPHILS # BLD AUTO: 3.98 K/UL — SIGNIFICANT CHANGE UP (ref 1.8–7.4)
NEUTROPHILS NFR BLD AUTO: 72.3 % — SIGNIFICANT CHANGE UP (ref 43–77)
NRBC # BLD: 0 /100 WBCS — SIGNIFICANT CHANGE UP (ref 0–0)
OTHER CELLS CSF MANUAL: 5 ML/DL — LOW (ref 18–22)
PCO2 BLDV: 61 MMHG — HIGH (ref 35–50)
PCO2 BLDV: 62 MMHG — HIGH (ref 35–50)
PH BLDV: 7.31 — LOW (ref 7.35–7.45)
PH BLDV: 7.36 — SIGNIFICANT CHANGE UP (ref 7.35–7.45)
PLAT MORPH BLD: NORMAL — SIGNIFICANT CHANGE UP
PLATELET # BLD AUTO: 240 K/UL — SIGNIFICANT CHANGE UP (ref 150–400)
PO2 BLDV: 22 MMHG — LOW (ref 25–45)
PO2 BLDV: 26 MMHG — SIGNIFICANT CHANGE UP (ref 25–45)
POTASSIUM BLDV-SCNC: 4 MMOL/L — SIGNIFICANT CHANGE UP (ref 3.5–5.3)
POTASSIUM BLDV-SCNC: 4.3 MMOL/L — SIGNIFICANT CHANGE UP (ref 3.5–5.3)
POTASSIUM SERPL-MCNC: 4.4 MMOL/L — SIGNIFICANT CHANGE UP (ref 3.5–5.3)
POTASSIUM SERPL-SCNC: 4.4 MMOL/L — SIGNIFICANT CHANGE UP (ref 3.5–5.3)
PROT SERPL-MCNC: 7.2 G/DL — SIGNIFICANT CHANGE UP (ref 6–8.3)
PROTHROM AB SERPL-ACNC: 13.1 SEC — HIGH (ref 10–12.9)
RBC # BLD: 3.1 M/UL — LOW (ref 3.8–5.2)
RBC # FLD: 16.1 % — HIGH (ref 10.3–14.5)
RBC BLD AUTO: SIGNIFICANT CHANGE UP
RH IG SCN BLD-IMP: POSITIVE — SIGNIFICANT CHANGE UP
SAO2 % BLDV: 23 % — LOW (ref 67–88)
SAO2 % BLDV: 34 % — LOW (ref 67–88)
SARS-COV-2 RNA SPEC QL NAA+PROBE: DETECTED
SODIUM SERPL-SCNC: 141 MMOL/L — SIGNIFICANT CHANGE UP (ref 135–145)
WBC # BLD: 5.5 K/UL — SIGNIFICANT CHANGE UP (ref 3.8–10.5)
WBC # FLD AUTO: 5.5 K/UL — SIGNIFICANT CHANGE UP (ref 3.8–10.5)

## 2020-05-17 PROCEDURE — 93010 ELECTROCARDIOGRAM REPORT: CPT

## 2020-05-17 PROCEDURE — 99285 EMERGENCY DEPT VISIT HI MDM: CPT | Mod: CS

## 2020-05-17 PROCEDURE — 71045 X-RAY EXAM CHEST 1 VIEW: CPT | Mod: 26

## 2020-05-17 RX ORDER — ASPIRIN/CALCIUM CARB/MAGNESIUM 324 MG
81 TABLET ORAL DAILY
Refills: 0 | Status: DISCONTINUED | OUTPATIENT
Start: 2020-05-17 | End: 2020-05-20

## 2020-05-17 RX ORDER — LISINOPRIL 2.5 MG/1
10 TABLET ORAL DAILY
Refills: 0 | Status: DISCONTINUED | OUTPATIENT
Start: 2020-05-17 | End: 2020-05-20

## 2020-05-17 RX ORDER — METOPROLOL TARTRATE 50 MG
50 TABLET ORAL DAILY
Refills: 0 | Status: DISCONTINUED | OUTPATIENT
Start: 2020-05-17 | End: 2020-05-20

## 2020-05-17 RX ORDER — HEPARIN SODIUM 5000 [USP'U]/ML
5000 INJECTION INTRAVENOUS; SUBCUTANEOUS EVERY 8 HOURS
Refills: 0 | Status: DISCONTINUED | OUTPATIENT
Start: 2020-05-17 | End: 2020-05-20

## 2020-05-17 RX ORDER — BUDESONIDE, MICRONIZED 100 %
0.5 POWDER (GRAM) MISCELLANEOUS
Refills: 0 | Status: DISCONTINUED | OUTPATIENT
Start: 2020-05-17 | End: 2020-05-20

## 2020-05-17 RX ORDER — SODIUM CHLORIDE 9 MG/ML
1000 INJECTION INTRAMUSCULAR; INTRAVENOUS; SUBCUTANEOUS ONCE
Refills: 0 | Status: DISCONTINUED | OUTPATIENT
Start: 2020-05-17 | End: 2020-05-18

## 2020-05-17 RX ORDER — CLOPIDOGREL BISULFATE 75 MG/1
75 TABLET, FILM COATED ORAL DAILY
Refills: 0 | Status: DISCONTINUED | OUTPATIENT
Start: 2020-05-18 | End: 2020-05-20

## 2020-05-17 RX ORDER — ATORVASTATIN CALCIUM 80 MG/1
20 TABLET, FILM COATED ORAL AT BEDTIME
Refills: 0 | Status: DISCONTINUED | OUTPATIENT
Start: 2020-05-17 | End: 2020-05-20

## 2020-05-17 RX ORDER — INSULIN LISPRO 100/ML
VIAL (ML) SUBCUTANEOUS AT BEDTIME
Refills: 0 | Status: DISCONTINUED | OUTPATIENT
Start: 2020-05-17 | End: 2020-05-20

## 2020-05-17 RX ORDER — OXYCODONE AND ACETAMINOPHEN 5; 325 MG/1; MG/1
1 TABLET ORAL ONCE
Refills: 0 | Status: DISCONTINUED | OUTPATIENT
Start: 2020-05-17 | End: 2020-05-17

## 2020-05-17 RX ORDER — INSULIN LISPRO 100/ML
VIAL (ML) SUBCUTANEOUS EVERY 6 HOURS
Refills: 0 | Status: DISCONTINUED | OUTPATIENT
Start: 2020-05-17 | End: 2020-05-18

## 2020-05-17 RX ORDER — INSULIN GLARGINE 100 [IU]/ML
5 INJECTION, SOLUTION SUBCUTANEOUS AT BEDTIME
Refills: 0 | Status: DISCONTINUED | OUTPATIENT
Start: 2020-05-17 | End: 2020-05-20

## 2020-05-17 RX ORDER — SEVELAMER CARBONATE 2400 MG/1
2400 POWDER, FOR SUSPENSION ORAL
Refills: 0 | Status: DISCONTINUED | OUTPATIENT
Start: 2020-05-17 | End: 2020-05-20

## 2020-05-17 RX ORDER — INSULIN LISPRO 100/ML
VIAL (ML) SUBCUTANEOUS
Refills: 0 | Status: DISCONTINUED | OUTPATIENT
Start: 2020-05-17 | End: 2020-05-17

## 2020-05-17 RX ORDER — VANCOMYCIN HCL 1 G
1000 VIAL (EA) INTRAVENOUS ONCE
Refills: 0 | Status: COMPLETED | OUTPATIENT
Start: 2020-05-17 | End: 2020-05-17

## 2020-05-17 RX ADMIN — OXYCODONE AND ACETAMINOPHEN 1 TABLET(S): 5; 325 TABLET ORAL at 23:11

## 2020-05-17 RX ADMIN — INSULIN GLARGINE 5 UNIT(S): 100 INJECTION, SOLUTION SUBCUTANEOUS at 20:54

## 2020-05-17 RX ADMIN — CLOPIDOGREL BISULFATE 75 MILLIGRAM(S): 75 TABLET, FILM COATED ORAL at 20:55

## 2020-05-17 RX ADMIN — ATORVASTATIN CALCIUM 20 MILLIGRAM(S): 80 TABLET, FILM COATED ORAL at 20:55

## 2020-05-17 RX ADMIN — HEPARIN SODIUM 5000 UNIT(S): 5000 INJECTION INTRAVENOUS; SUBCUTANEOUS at 20:54

## 2020-05-17 RX ADMIN — Medication 250 MILLIGRAM(S): at 14:12

## 2020-05-17 RX ADMIN — Medication 81 MILLIGRAM(S): at 20:55

## 2020-05-17 RX ADMIN — Medication 2: at 17:11

## 2020-05-17 RX ADMIN — SEVELAMER CARBONATE 2400 MILLIGRAM(S): 2400 POWDER, FOR SUSPENSION ORAL at 17:11

## 2020-05-17 NOTE — ED ADULT NURSE NOTE - NSIMPLEMENTINTERV_GEN_ALL_ED
Implemented All Fall Risk Interventions:  [place] to call system. Call bell, personal items and telephone within reach. Instruct patient to call for assistance. Room bathroom lighting operational. Non-slip footwear when patient is off stretcher. Physically safe environment: no spills, clutter or unnecessary equipment. Stretcher in lowest position, wheels locked, appropriate side rails in place. Provide visual cue, wrist band, yellow gown, etc. Monitor gait and stability. Monitor for mental status changes and reorient to person, place, and time. Review medications for side effects contributing to fall risk. Reinforce activity limits and safety measures with patient and family.

## 2020-05-17 NOTE — ED ADULT NURSE NOTE - OBJECTIVE STATEMENT
64 year female sent in for infection in the dialysis cath on her left arm.  Pt has a small amount of discharge from the cath site.  Pt has dialysis  T TH SAturaday ans had it yesterday.  NO fever chills noted and no Covid symptoms Pt tested Positive on MAy 3rd.  IVL placed and  bloods sent as ordered and pt tba FOR THE REPLACEMENT OF THE CATHETER [email or web address]

## 2020-05-17 NOTE — CHART NOTE - NSCHARTNOTEFT_GEN_A_CORE
VASCULAR SURGERY RESIDENT CHART NOTE    Patient discussed with nephrologist, Dr. Schmidt ([facility]  [phone])  Patient well known to him. Discussed that she was dialyzed yesterday, currently has K of 4.4 and is asymptomatic with no evidence of fluid overload.  Aware of plan for OR tomorrow and will assess patient tomorrow for need for dialysis post operatively.    page [phone] with vascular surgery questions  Gaby Pacheco, PGY-4

## 2020-05-17 NOTE — ED PROVIDER NOTE - CARE PLAN
Principal Discharge DX:	Infection of arteriovenous fistula, initial encounter  Secondary Diagnosis:	ESRD (end stage renal disease) on dialysis

## 2020-05-17 NOTE — H&P ADULT - NSHPLABSRESULTS_GEN_ALL_CORE
CBC Full  -  ( 17 May 2020 13:41 )  WBC Count : 5.50 K/uL  RBC Count : 3.10 M/uL  Hemoglobin : 10.2 g/dL  Hematocrit : 34.7 %  Platelet Count - Automated : 240 K/uL  Mean Cell Volume : 111.9 fl  Mean Cell Hemoglobin : 32.9 pg  Mean Cell Hemoglobin Concentration : 29.4 gm/dL  Auto Neutrophil # : x  Auto Lymphocyte # : x  Auto Monocyte # : x  Auto Eosinophil # : x  Auto Basophil # : x  Auto Neutrophil % : x  Auto Lymphocyte % : x  Auto Monocyte % : x  Auto Eosinophil % : x  Auto Basophil % : x    05-17    141  |  93<L>  |  16  ----------------------------<  278<H>  4.4   |  29  |  4.12<H>    Ca    9.8      17 May 2020 13:41    TPro  7.2  /  Alb  4.2  /  TBili  0.3  /  DBili  x   /  AST  18  /  ALT  x   /  AlkPhos  267<H>  05-17    LIVER FUNCTIONS - ( 17 May 2020 13:41 )  Alb: 4.2 g/dL / Pro: 7.2 g/dL / ALK PHOS: 267 U/L / ALT: x     / AST: 18 U/L / GGT: x           CAPILLARY BLOOD GLUCOSE          PT/INR - ( 17 May 2020 13:41 )   PT: 13.1 sec;   INR: 1.14 ratio         PTT - ( 17 May 2020 13:41 )  PTT:33.7 sec

## 2020-05-17 NOTE — ED PROVIDER NOTE - PROGRESS NOTE DETAILS
Spoke to Dr. Elizalde who will take patient to his service. Agrees with vanco. Plan for surgery tomorrow. Also spoke with vascular team, already know about patient and will come see. Covid swab ordered.

## 2020-05-17 NOTE — ED PROVIDER NOTE - PHYSICAL EXAMINATION
Gen: No acute distress, alert, cooperative  HENT: Normocephalic, atraumatic.   Eyes: PERRL, EOMI    Resp: CTAB, non-labored, no wheeze  CV: rrr, no murmur  Abd: soft, NTND, no rebound or guarding  MSK: No CVAT bilaterally, no midline ttp  Skin: .75cm pustular area over fistula site, nontender  Neuro: AOx3, speech is fluent and appropriate, no focal deficits  Psych: Normal affect

## 2020-05-17 NOTE — H&P ADULT - NSHPPHYSICALEXAM_GEN_ALL_CORE
General: WN/WD NAD  Neurology: A&Ox3, nonfocal, CARR x 4  Head:  Normocephalic, atraumatic  ENT:  Mucosa moist, no ulcerations  Neck:  Supple, no sinuses or palpable masses  Lymphatic:  No palpable cervical, supraclavicular, axillary or inguinal adenopathy  Respiratory: CTA B/L  CV: RRR, S1S2, no murmur  Abdominal: Soft, NT, ND no palpable mass  MSK: LUE with AVF with small ulceration, not bleeding, palpable thrill

## 2020-05-17 NOTE — ED PROVIDER NOTE - OBJECTIVE STATEMENT
63yo hx copd, dm, chf, cva, cad, esrd tu/th/sat, last HD yesterday presenting for possible AVF infection. She sent pics of AVF to her vascular surgeon yesterday, Dr. Elizalde. Dr. Elizalde advised patient to come to ED as he was concerned about infection to the site and is planning on operating. Patient denies any symptoms including pain to site or fevers.

## 2020-05-17 NOTE — H&P ADULT - ATTENDING COMMENTS
Full H&P as above; discussed with surgery resident and vascular fellow.   She has ESRD, DM, CAD and PAD. She has a deep wound in the mid to distal upper arm segment of the fistula. 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 The wound has been getting deeper and has the potential to cause massive bleeding. A recent duplex scan noted no pseudoaneurysm, but the wound is now worse. She will be admitted ti observe the fistula and tomorrow will get a revision of the fistula where the wound will be excised. The procedure may involve creating a skin flap or excising the aneurysmal segment of the fistula and closing the wound. She will require antibiotics to prevent infection due to the chronic nature of the wound.

## 2020-05-17 NOTE — CONSULT NOTE ADULT - ASSESSMENT
64 f with    AVF ulceration  - for revision      Pneumonia due to 2019 novel coronavirus.  - resolved.   - airborne + contact isolation    COPD  -MDI albuterol prn    CAD  -cardiology evaluation.    -c/w aspirin, plavix, statin, bb.     -f/u further cards recs    ESRD on hemodialysis.   -c/w sevelemer  -off midodrine  -renal follow Dr. Schmidt.     Type 2 diabetes mellitus with hyperglycemia, with long-term current use of insulin.  -Lantus 13/humalog 2 premeal    Advanced care planning/counseling discussion.  - pt was DNR/ DNI during previous admission.   - trial of IV fluids/abx acceptable.     Prophylactic measure.   - Heparin    No acute medical condition identified to postpone planned surgical intervention.     Further action as per clinical course       Pierce Viera MD pager [phone]

## 2020-05-17 NOTE — H&P ADULT - HISTORY OF PRESENT ILLNESS
64F with CAD Sp PTCA w/ stents, CHF, COPD, CVA, DMT1, ESRD on HD (M,Tu,Th,Sa), Gout, HLD, PVD, Subclavian Stenosis (L) s/p stent presents with ulceration over LUE AVF.   As per patient, she has been feeling well since her discharge from this hospital recently when she was hospitalized for issues with glycemic control, and was diagnosed with COVID at that time, but when seen at the dialysis center yesterday, they advised her to come into the ED.  In the ED, patient with normal vital signs and labs. LUE with AVF with palpable thrill. Small ~ 5mm area of ulceration, slightly worse than last time I saw it on 5/6/2020.

## 2020-05-17 NOTE — H&P ADULT - ASSESSMENT
64F with CAD Sp PTCA w/ stents, CHF, COPD, CVA, DMT1, ESRD on HD (M,Tu,Th,Sa), Gout, HLD, PVD, Subclavian Stenosis (L) s/p stent presents with ulceration over LUE AVF.     -plan for OR tomorrow  -NPO pMN  -consented    page [phone] with questions  Gaby Pacheco, PGY-4 64F with CAD Sp PTCA w/ stents, CHF, COPD, CVA, DMT1, ESRD on HD (M,Tu,Th,Sa), Gout, HLD, PVD, Subclavian Stenosis (L) s/p stent presents with ulceration over LUE AVF.     -plan for OR tomorrow  -NPO pMN  -consented  - medicine  (Aylin)/ cardiology (Soren)/ nephrology (Brayan) contacted to see the patient    page [phone] with questions  Gaby Pacheco, PGY-4

## 2020-05-17 NOTE — ED PROVIDER NOTE - CLINICAL SUMMARY MEDICAL DECISION MAKING FREE TEXT BOX
karina pt with esrd dialysi from av fistula on l arm 3x/ week with last done on saturday - pt with increase pustular area at fistula skin site pt endorses scant purulent dc- no fever - no pain -- will consult vasc sx, admit for iv abx and possible revision

## 2020-05-17 NOTE — CONSULT NOTE ADULT - SUBJECTIVE AND OBJECTIVE BOX
HPI:  64F with CAD Sp PTCA w/ stents, CHF, COPD, CVA, DMT1, ESRD on HD (M,Tu,Th,Sa), Gout, HLD, PVD, Subclavian Stenosis (L) s/p stent presents with ulceration over LUE AVF.   As per patient, she has been feeling well since her discharge from this hospital recently when she was hospitalized for issues with glycemic control, and was diagnosed with COVID at that time, but when seen at the dialysis center yesterday, they advised her to come into the ED.  In the ED, patient with normal vital signs and labs. LUE with AVF with palpable thrill. Small ~ 5mm area of ulceration, slightly worse.      PAST MEDICAL & SURGICAL HISTORY:  COPD (chronic obstructive pulmonary disease)  Localized enlarged lymph nodes  CHF (congestive heart failure): EF 40-45%  Subclavian vein stenosis, left: s/p stent  DKA, type 1: 1/2015  ACS (acute coronary syndrome): 1/2015 - cath revealed 100% ostial stenosis not amenable to PCI - medical management  TIA (transient ischemic attack): x 2 - 8-9 years ago prior to ASD/VSD repair  CAD (coronary artery disease): s/p stents  Gout: past  CVA (cerebral infarction): with no residual, 8 yrs ago, prior to heart surgery - ST memory loss  Peripheral vascular disease: occluded left fem-pop bypass 5/2015  Diabetes mellitus type 1: Insulin Dependent -  ESRD (end stage renal disease): dialysis  M, tue, thursday, saturday  Hyperlipidemia  Status post device closure of ASD: &quot;clamshell&quot;  History of cardiac catheterization: 1/2015 - no intervention  S/P femoral-popliteal bypass surgery: L and R in 2013 with graft; 5/2015 CFA angioplasty left and ileofemoral endarterectomywith vein patch angioplasty of left fem-pop bypass graft  Multiple vascular surgery both leg, left fempop bypass revision 11/2015  AV (arteriovenous fistula): Left AV graft; revision with stent placement 2-3 years ago  S/P cholecystectomy      Review of Systems:   CONSTITUTIONAL: No fever, weight loss, or fatigue  EYES: No eye pain, visual disturbances, or discharge  ENMT:  No difficulty hearing, tinnitus, vertigo; No sinus or throat pain  NECK: No pain or stiffness  BREASTS: No pain, masses, or nipple discharge  RESPIRATORY: No cough, wheezing, chills or hemoptysis; No shortness of breath  CARDIOVASCULAR: No chest pain, palpitations, dizziness, or leg swelling  GASTROINTESTINAL: No abdominal or epigastric pain. No nausea, vomiting, or hematemesis; No diarrhea or constipation. No melena or hematochezia.  GENITOURINARY: No dysuria, frequency, hematuria, or incontinence  NEUROLOGICAL: No headaches, memory loss, loss of strength, numbness, or tremors  SKIN: No itching, burning, rashes, or lesions   LYMPH NODES: No enlarged glands  ENDOCRINE: No heat or cold intolerance; No hair loss  MUSCULOSKELETAL: No joint pain or swelling; No muscle, back, or extremity pain  PSYCHIATRIC: No depression, anxiety, mood swings, or difficulty sleeping  HEME/LYMPH: No easy bruising, or bleeding gums  ALLERY AND IMMUNOLOGIC: No hives or eczema    Allergies    No Known Allergies    Intolerances        Social History:     FAMILY HISTORY:  Family history of smoking  Family history of hypertension  Family history of cancer (Sibling)      MEDICATIONS  (STANDING):  aspirin enteric coated 81 milliGRAM(s) Oral daily  atorvastatin 20 milliGRAM(s) Oral at bedtime  heparin   Injectable 5000 Unit(s) SubCutaneous every 8 hours  insulin glargine Injectable (LANTUS) 5 Unit(s) SubCutaneous at bedtime  insulin lispro (HumaLOG) corrective regimen sliding scale   SubCutaneous at bedtime  insulin lispro (HumaLOG) corrective regimen sliding scale   SubCutaneous every 6 hours  lisinopril 10 milliGRAM(s) Oral daily  metoprolol succinate ER 50 milliGRAM(s) Oral daily  sevelamer carbonate 2400 milliGRAM(s) Oral three times a day with meals  silver sulfADIAZINE 1% Cream 1 Application(s) Topical daily  sodium chloride 0.9% Bolus 1000 milliLiter(s) IV Bolus once    MEDICATIONS  (PRN):  buDESOnide    Inhalation Suspension 0.5 milliGRAM(s) Inhalation two times a day PRN wheezing        CAPILLARY BLOOD GLUCOSE      POCT Blood Glucose.: 187 mg/dL (17 May 2020 17:08)    I&O's Summary    17 May 2020 07:01  -  17 May 2020 20:39  --------------------------------------------------------  IN: 990 mL / OUT: 0 mL / NET: 990 mL        PHYSICAL EXAM:  GENERAL: NAD  HEAD:  Atraumatic, Normocephalic  EYES: EOMI, PERRLA, conjunctiva and sclera clear  NECK: Supple, No JVD  CHEST/LUNG: Clear to auscultation bilaterally; No wheeze  HEART: Regular rate and rhythm; No murmurs, rubs, or gallops  ABDOMEN: Soft, Nontender, Nondistended; Bowel sounds present  EXTREMITIES: 1+bipedal edema L>R L arm AVF with ulceration  PSYCH: AAOx3  NEUROLOGY: non-focal  SKIN: No rashes or lesions    LABS:                        10.2   5.50  )-----------( 240      ( 17 May 2020 13:41 )             34.7     05-17    141  |  93<L>  |  16  ----------------------------<  278<H>  4.4   |  29  |  4.12<H>    Ca    9.8      17 May 2020 13:41    TPro  7.2  /  Alb  4.2  /  TBili  0.3  /  DBili  x   /  AST  18  /  ALT  <5<L>  /  AlkPhos  267<H>  05-17    PT/INR - ( 17 May 2020 13:41 )   PT: 13.1 sec;   INR: 1.14 ratio         PTT - ( 17 May 2020 13:41 )  PTT:33.7 sec          RADIOLOGY & ADDITIONAL TESTS:    Imaging Personally Reviewed:    Consultant(s) Notes Reviewed:      Care Discussed with Consultants/Other Providers:

## 2020-05-17 NOTE — ED ADULT TRIAGE NOTE - CHIEF COMPLAINT QUOTE
pt sent by pmd for infected HD cath; pt states she gets HD qTU/TH/Sa.  pt denies any fever, chills, bodyaches or sick contacts

## 2020-05-18 ENCOUNTER — RESULT REVIEW (OUTPATIENT)
Age: 65
End: 2020-05-18

## 2020-05-18 LAB
ANION GAP SERPL CALC-SCNC: 18 MMOL/L — HIGH (ref 5–17)
BLD GP AB SCN SERPL QL: NEGATIVE — SIGNIFICANT CHANGE UP
BUN SERPL-MCNC: 19 MG/DL — SIGNIFICANT CHANGE UP (ref 7–23)
CALCIUM SERPL-MCNC: 9.3 MG/DL — SIGNIFICANT CHANGE UP (ref 8.4–10.5)
CHLORIDE SERPL-SCNC: 94 MMOL/L — LOW (ref 96–108)
CO2 SERPL-SCNC: 25 MMOL/L — SIGNIFICANT CHANGE UP (ref 22–31)
CREAT SERPL-MCNC: 4.78 MG/DL — HIGH (ref 0.5–1.3)
GLUCOSE BLDC GLUCOMTR-MCNC: 127 MG/DL — HIGH (ref 70–99)
GLUCOSE BLDC GLUCOMTR-MCNC: 143 MG/DL — HIGH (ref 70–99)
GLUCOSE BLDC GLUCOMTR-MCNC: 191 MG/DL — HIGH (ref 70–99)
GLUCOSE BLDC GLUCOMTR-MCNC: 215 MG/DL — HIGH (ref 70–99)
GLUCOSE SERPL-MCNC: 131 MG/DL — HIGH (ref 70–99)
HCT VFR BLD CALC: 36.2 % — SIGNIFICANT CHANGE UP (ref 34.5–45)
HGB BLD-MCNC: 10.7 G/DL — LOW (ref 11.5–15.5)
MAGNESIUM SERPL-MCNC: 2.4 MG/DL — SIGNIFICANT CHANGE UP (ref 1.6–2.6)
MCHC RBC-ENTMCNC: 29.6 GM/DL — LOW (ref 32–36)
MCHC RBC-ENTMCNC: 33 PG — SIGNIFICANT CHANGE UP (ref 27–34)
MCV RBC AUTO: 111.7 FL — HIGH (ref 80–100)
NRBC # BLD: 0 /100 WBCS — SIGNIFICANT CHANGE UP (ref 0–0)
PHOSPHATE SERPL-MCNC: 5.8 MG/DL — HIGH (ref 2.5–4.5)
PLATELET # BLD AUTO: 224 K/UL — SIGNIFICANT CHANGE UP (ref 150–400)
POTASSIUM SERPL-MCNC: 4.9 MMOL/L — SIGNIFICANT CHANGE UP (ref 3.5–5.3)
POTASSIUM SERPL-SCNC: 4.9 MMOL/L — SIGNIFICANT CHANGE UP (ref 3.5–5.3)
RBC # BLD: 3.24 M/UL — LOW (ref 3.8–5.2)
RBC # FLD: 16.1 % — HIGH (ref 10.3–14.5)
RH IG SCN BLD-IMP: POSITIVE — SIGNIFICANT CHANGE UP
SODIUM SERPL-SCNC: 137 MMOL/L — SIGNIFICANT CHANGE UP (ref 135–145)
WBC # BLD: 4.3 K/UL — SIGNIFICANT CHANGE UP (ref 3.8–10.5)
WBC # FLD AUTO: 4.3 K/UL — SIGNIFICANT CHANGE UP (ref 3.8–10.5)

## 2020-05-18 PROCEDURE — 88304 TISSUE EXAM BY PATHOLOGIST: CPT | Mod: 26

## 2020-05-18 RX ORDER — VANCOMYCIN HCL 1 G
1000 VIAL (EA) INTRAVENOUS ONCE
Refills: 0 | Status: COMPLETED | OUTPATIENT
Start: 2020-05-18 | End: 2020-05-18

## 2020-05-18 RX ORDER — ACETAMINOPHEN 500 MG
1000 TABLET ORAL ONCE
Refills: 0 | Status: DISCONTINUED | OUTPATIENT
Start: 2020-05-18 | End: 2020-05-20

## 2020-05-18 RX ORDER — OXYCODONE AND ACETAMINOPHEN 5; 325 MG/1; MG/1
1 TABLET ORAL EVERY 4 HOURS
Refills: 0 | Status: DISCONTINUED | OUTPATIENT
Start: 2020-05-18 | End: 2020-05-20

## 2020-05-18 RX ORDER — INSULIN LISPRO 100/ML
VIAL (ML) SUBCUTANEOUS
Refills: 0 | Status: DISCONTINUED | OUTPATIENT
Start: 2020-05-18 | End: 2020-05-20

## 2020-05-18 RX ORDER — HYDROMORPHONE HYDROCHLORIDE 2 MG/ML
0.5 INJECTION INTRAMUSCULAR; INTRAVENOUS; SUBCUTANEOUS
Refills: 0 | Status: DISCONTINUED | OUTPATIENT
Start: 2020-05-18 | End: 2020-05-18

## 2020-05-18 RX ORDER — SODIUM CHLORIDE 9 MG/ML
1000 INJECTION, SOLUTION INTRAVENOUS
Refills: 0 | Status: DISCONTINUED | OUTPATIENT
Start: 2020-05-18 | End: 2020-05-18

## 2020-05-18 RX ADMIN — OXYCODONE AND ACETAMINOPHEN 1 TABLET(S): 5; 325 TABLET ORAL at 15:25

## 2020-05-18 RX ADMIN — HEPARIN SODIUM 5000 UNIT(S): 5000 INJECTION INTRAVENOUS; SUBCUTANEOUS at 06:33

## 2020-05-18 RX ADMIN — ATORVASTATIN CALCIUM 20 MILLIGRAM(S): 80 TABLET, FILM COATED ORAL at 22:28

## 2020-05-18 RX ADMIN — OXYCODONE AND ACETAMINOPHEN 1 TABLET(S): 5; 325 TABLET ORAL at 19:45

## 2020-05-18 RX ADMIN — LISINOPRIL 10 MILLIGRAM(S): 2.5 TABLET ORAL at 06:33

## 2020-05-18 RX ADMIN — HEPARIN SODIUM 5000 UNIT(S): 5000 INJECTION INTRAVENOUS; SUBCUTANEOUS at 13:26

## 2020-05-18 RX ADMIN — SEVELAMER CARBONATE 2400 MILLIGRAM(S): 2400 POWDER, FOR SUSPENSION ORAL at 13:26

## 2020-05-18 RX ADMIN — HEPARIN SODIUM 5000 UNIT(S): 5000 INJECTION INTRAVENOUS; SUBCUTANEOUS at 22:28

## 2020-05-18 RX ADMIN — SEVELAMER CARBONATE 2400 MILLIGRAM(S): 2400 POWDER, FOR SUSPENSION ORAL at 17:11

## 2020-05-18 RX ADMIN — Medication 250 MILLIGRAM(S): at 13:35

## 2020-05-18 RX ADMIN — Medication 50 MILLIGRAM(S): at 06:33

## 2020-05-18 RX ADMIN — INSULIN GLARGINE 5 UNIT(S): 100 INJECTION, SOLUTION SUBCUTANEOUS at 22:29

## 2020-05-18 RX ADMIN — Medication 2: at 17:03

## 2020-05-18 NOTE — CHART NOTE - NSCHARTNOTEFT_GEN_A_CORE
POST-OPERATIVE NOTE    Subjective:  Patient is s/p AV fistula revision. Patient denies any n/v, chest pain, or SOB. Pain is currently well controlled. Recovering appropriately.    Objective:  Physical Exam:  General: NAD, resting comfortably in bed  Pulmonary: Nonlabored breathing, no respiratory distress  Cardiovascular: RRR  Abdominal: soft, mildly distended, appropriately tender around incisions, dressings c/d/i  Vascular: Left AV fistula with mild strikethrough, palpable thrill. Left radial pulse intact. Hand warm.         Vital Signs Last 24 Hrs  T(C): 36.6 (18 May 2020 16:40), Max: 36.6 (18 May 2020 09:28)  T(F): 97.8 (18 May 2020 16:40), Max: 97.9 (18 May 2020 09:28)  HR: 66 (18 May 2020 16:40) (60 - 72)  BP: 111/70 (18 May 2020 16:40) (111/70 - 177/80)  BP(mean): --  RR: 18 (18 May 2020 16:40) (16 - 18)  SpO2: 92% (18 May 2020 16:40) (92% - 100%)  I&O's Detail    17 May 2020 07:01  -  18 May 2020 07:00  --------------------------------------------------------  IN:    IV PiggyBack: 250 mL    Oral Fluid: 360 mL    Sodium Chloride 0.9% IV Bolus: 500 mL  Total IN: 1110 mL    OUT:  Total OUT: 0 mL    Total NET: 1110 mL      18 May 2020 07:01  -  18 May 2020 18:25  --------------------------------------------------------  IN:    IV PiggyBack: 250 mL    Oral Fluid: 150 mL  Total IN: 400 mL    OUT:  Total OUT: 0 mL    Total NET: 400 mL        aspirin enteric coated 81  clopidogrel Tablet 75  heparin   Injectable 5000  lisinopril 10  metoprolol succinate ER 50    PAST MEDICAL & SURGICAL HISTORY:  COPD (chronic obstructive pulmonary disease)  Localized enlarged lymph nodes  CHF (congestive heart failure): EF 40-45%  Subclavian vein stenosis, left: s/p stent  DKA, type 1: 1/2015  ACS (acute coronary syndrome): 1/2015 - cath revealed 100% ostial stenosis not amenable to PCI - medical management  TIA (transient ischemic attack): x 2 - 8-9 years ago prior to ASD/VSD repair  CAD (coronary artery disease): s/p stents  Gout: past  CVA (cerebral infarction): with no residual, 8 yrs ago, prior to heart surgery - ST memory loss  Peripheral vascular disease: occluded left fem-pop bypass 5/2015  Diabetes mellitus type 1: Insulin Dependent -  ESRD (end stage renal disease): dialysis  M, tue, thursday, saturday  Hyperlipidemia  Status post device closure of ASD: &quot;clamshell&quot;  History of cardiac catheterization: 1/2015 - no intervention  S/P femoral-popliteal bypass surgery: L and R in 2013 with graft; 5/2015 CFA angioplasty left and ileofemoral endarterectomywith vein patch angioplasty of left fem-pop bypass graft  Multiple vascular surgery both leg, left fempop bypass revision 11/2015  AV (arteriovenous fistula): Left AV graft; revision with stent placement 2-3 years ago  S/P cholecystectomy          LABS:                        10.7   4.30  )-----------( 224      ( 18 May 2020 09:44 )             36.2     05-18    137  |  94<L>  |  19  ----------------------------<  131<H>  4.9   |  25  |  4.78<H>    Ca    9.3      18 May 2020 07:13  Phos  5.8     05-18  Mg     2.4     05-18    TPro  7.2  /  Alb  4.2  /  TBili  0.3  /  DBili  x   /  AST  18  /  ALT  <5<L>  /  AlkPhos  267<H>  05-17    PT/INR - ( 17 May 2020 13:41 )   PT: 13.1 sec;   INR: 1.14 ratio         PTT - ( 17 May 2020 13:41 )  PTT:33.7 sec  CAPILLARY BLOOD GLUCOSE      POCT Blood Glucose.: 191 mg/dL (18 May 2020 16:31)  POCT Blood Glucose.: 143 mg/dL (18 May 2020 13:24)  POCT Blood Glucose.: 127 mg/dL (18 May 2020 08:01)  POCT Blood Glucose.: 119 mg/dL (17 May 2020 20:42)      Radiology and Additional Studies:    Assessment:  The patient is a 64y Female who is now several hours post-op from a left AV fistula revision.     Plan:  - Pain control as needed  - SQH/Plavix for DVT ppx  - OOB and ambulating as tolerated  - F/u AM labs    Vascular Surgery  [id number]

## 2020-05-18 NOTE — CONSULT NOTE ADULT - ASSESSMENT
64F with CAD Sp PTCA w/ stents, CHF, COPD, CVA, DMT1, ESRD on HD (M,Tu,Th,Sa), Gout, HLD, PVD, Subclavian Stenosis (L) s/p stent presents with ulceration over LUE AVF which was being monitored last admission and as outpt. In the ED, patient with normal vital signs and labs. LUE with AVF with palpable thrill. Small ~ 5mm area of ulceration, slightly worse than last time I saw it on 5/6/2020. underwent revision avf this am        1- ESRD  2- SHPT  3- HTN   4- covid 19       hd consent obtained witnessed and placed in chart  renvela 3 tab with meals  hd in am   lytes in range  cont lisinopril 10 mg daily   cont metoprolol

## 2020-05-18 NOTE — CHART NOTE - NSCHARTNOTEFT_GEN_A_CORE
MD spoke to patient cardiologist Dr. Doty through answering service [phone]. Per Dr. Doty, patient currently optimized from perspective of Cardiology. Dr. Doty will leave note later today.    Ketan Haro  General Surgery, PGY 1

## 2020-05-18 NOTE — CONSULT NOTE ADULT - SUBJECTIVE AND OBJECTIVE BOX
[facility]  [phone]  NEPHROLOGY      INITIAL CONSULT NOTE  --------------------------------------------------------------------------------  HPI:    64F with CAD Sp PTCA w/ stents, CHF, COPD, CVA, DMT1, ESRD on HD (M,Tu,Th,Sa), Gout, HLD, PVD, Subclavian Stenosis (L) s/p stent presents with ulceration over LUE AVF which was being monitored last admission and as outpt. In the ED, patient with normal vital signs and labs. LUE with AVF with palpable thrill. Small ~ 5mm area of ulceration, slightly worse than last time I saw it on 5/6/2020. underwent revision avf this am  renal consult called for esrd      PAST HISTORY  --------------------------------------------------------------------------------  PAST MEDICAL & SURGICAL HISTORY:  COPD (chronic obstructive pulmonary disease)  Localized enlarged lymph nodes  CHF (congestive heart failure): EF 40-45%  Subclavian vein stenosis, left: s/p stent  DKA, type 1: 1/2015  ACS (acute coronary syndrome): 1/2015 - cath revealed 100% ostial stenosis not amenable to PCI - medical management  TIA (transient ischemic attack): x 2 - 8-9 years ago prior to ASD/VSD repair  CAD (coronary artery disease): s/p stents  Gout: past  CVA (cerebral infarction): with no residual, 8 yrs ago, prior to heart surgery - ST memory loss  Peripheral vascular disease: occluded left fem-pop bypass 5/2015  Diabetes mellitus type 1: Insulin Dependent -  ESRD (end stage renal disease): dialysis  M, tue, thursday, saturday  Hyperlipidemia  Status post device closure of ASD: &quot;bren[place]&quot;  History of cardiac catheterization: 1/2015 - no intervention  S/P femoral-popliteal bypass surgery: L and R in 2013 with graft; 5/2015 CFA angioplasty left and ileofemoral endarterectomywith vein patch angioplasty of left fem-pop bypass graft  Multiple vascular surgery both leg, left fempop bypass revision 11/2015  AV (arteriovenous fistula): Left AV graft; revision with stent placement 2-3 years ago  S/P cholecystectomy    FAMILY HISTORY:  Family history of smoking  Family history of hypertension  Family history of cancer (Sibling)    PAST SOCIAL HISTORY: past hx of tobacco use     ALLERGIES & MEDICATIONS  --------------------------------------------------------------------------------  Allergies    No Known Allergies    Intolerances      Standing Inpatient Medications  acetaminophen  IVPB .. 1000 milliGRAM(s) IV Intermittent once  aspirin enteric coated 81 milliGRAM(s) Oral daily  atorvastatin 20 milliGRAM(s) Oral at bedtime  clopidogrel Tablet 75 milliGRAM(s) Oral daily  heparin   Injectable 5000 Unit(s) SubCutaneous every 8 hours  insulin glargine Injectable (LANTUS) 5 Unit(s) SubCutaneous at bedtime  insulin lispro (HumaLOG) corrective regimen sliding scale   SubCutaneous at bedtime  insulin lispro (HumaLOG) corrective regimen sliding scale   SubCutaneous three times a day before meals  lisinopril 10 milliGRAM(s) Oral daily  metoprolol succinate ER 50 milliGRAM(s) Oral daily  sevelamer carbonate 2400 milliGRAM(s) Oral three times a day with meals  silver sulfADIAZINE 1% Cream 1 Application(s) Topical daily  sodium chloride 0.9% Bolus 1000 milliLiter(s) IV Bolus once    PRN Inpatient Medications  buDESOnide    Inhalation Suspension 0.5 milliGRAM(s) Inhalation two times a day PRN  oxycodone    5 mG/acetaminophen 325 mG 1 Tablet(s) Oral every 4 hours PRN      REVIEW OF SYSTEMS  --------------------------------------------------------------------------------  Gen: No  fevers/chills   Skin: No rashes  Head/Eyes/Ears/Mouth: No headache; Normal hearing;  No sinus pain/discomfort, sore throat  Respiratory: No dyspnea, cough, wheezing   CV: No chest pain, or palp   GI: No abdominal pain, diarrhea, nausea, vomiting, melena  : No dysuria,   MSK: avf site pain  no back pain  Neuro: No dizziness/lightheadedness,   also with ++  edema     All other systems were reviewed and are negative, except as noted.    VITALS/PHYSICAL EXAM  --------------------------------------------------------------------------------  T(C): 36.7 (05-18-20 @ 20:45), Max: 36.7 (05-18-20 @ 20:45)  HR: 68 (05-18-20 @ 20:45) (60 - 71)  BP: 148/81 (05-18-20 @ 20:45) (111/70 - 177/80)  RR: 18 (05-18-20 @ 20:45) (16 - 18)  SpO2: 96% (05-18-20 @ 20:45) (92% - 100%)  Wt(kg): --  Height (cm): 162.6 (05-17-20 @ 15:55)  Weight (kg): 53.524 (05-17-20 @ 15:55)  BMI (kg/m2): 20.2 (05-17-20 @ 15:55)  BSA (m2): 1.56 (05-17-20 @ 15:55)      05-17-20 @ 07:01  -  05-18-20 @ 07:00  --------------------------------------------------------  IN: 1110 mL / OUT: 0 mL / NET: 1110 mL    05-18-20 @ 07:01  -  05-18-20 @ 21:33  --------------------------------------------------------  IN: 400 mL / OUT: 0 mL / NET: 400 mL      Physical Exam:  	Gen: Non toxic comfortable appearing   	no jvd  	Pulm: decrease bs  no rales or ronchi or wheezing  	CV: RRR, S1S2; no rub  	Abd: +BS, soft, nontender/nondistended  	: No suprapubic tenderness  	UE: Warm, no cyanosis  no clubbing,  no edema   	LE: Warm, no cyanosis  no clubbing, 2+  edema  	Neuro: alert and oriented.   		Skin: Warm, no decrease skin turgor   	Vascular access: + avf + bruit and thrill + dressing over avf     LABS/STUDIES  --------------------------------------------------------------------------------              10.7   4.30  >-----------<  224      [05-18-20 @ 09:44]              36.2     137  |  94  |  19  ----------------------------<  131      [05-18-20 @ 07:13]  4.9   |  25  |  4.78        Ca     9.3     [05-18-20 @ 07:13]      Mg     2.4     [05-18-20 @ 07:13]      Phos  5.8     [05-18-20 @ 07:13]    TPro  7.2  /  Alb  4.2  /  TBili  0.3  /  DBili  x   /  AST  18  /  ALT  <5  /  AlkPhos  267  [05-17-20 @ 13:41]    PT/INR: PT 13.1 , INR 1.14       [05-17-20 @ 13:41]  PTT: 33.7       [05-17-20 @ 13:41]      Creatinine Trend:  SCr 4.78 [05-18 @ 07:13]  SCr 4.12 [05-17 @ 13:41]  SCr 6.29 [05-07 @ 08:24]  SCr 5.02 [05-06 @ 10:16]  SCr 4.51 [05-04 @ 09:58]    Urinalysis - [06-04-17 @ 08:24]      Color Yellow / Appearance Clear / SG 1.013 / pH 8.5      Gluc 1000 / Ketone Negative  / Bili Negative / Urobili Negative       Blood Negative / Protein >600 / Leuk Est Small / Nitrite Negative      RBC 3-5 / WBC 6-10 / Hyaline  / Gran  / Sq Epi  / Non Sq Epi OCC / Bacteria       Iron 67, TIBC 234, %sat 29      [12-04-19 @ 08:38]  Ferritin 1387      [05-06-20 @ 12:17]  PTH -- (Ca 8.5)      [05-04-20 @ 04:39]   1317  PTH -- (Ca 8.3)      [12-04-19 @ 08:38]   952  PTH -- (Ca 9.6)      [06-17-19 @ 18:06]   177  HbA1c 7.7      [04-08-20 @ 08:03]  TSH 1.78      [11-14-19 @ 09:15]  Lipid: chol 108, TG 76, HDL 57, LDL 36      [11-14-19 @ 09:16]    HBsAb <3.0      [05-04-20 @ 04:09]  HBsAb Nonreact      [05-04-20 @ 04:09]  HBsAg Nonreact      [05-04-20 @ 04:09]  HBcAb Nonreact      [05-04-20 @ 04:09]  HCV 0.18, Nonreact      [05-04-20 @ 04:09]

## 2020-05-18 NOTE — CONSULT NOTE ADULT - SUBJECTIVE AND OBJECTIVE BOX
CHIEF COMPLAINT: avf repair    HISTORY OF PRESENT ILLNESS:  64F with CAD Sp PTCA w/ stents, CHF, COPD, CVA, DMT1, ESRD on HD (M,Tu,Th,Sa), Gout, HLD, PVD, Subclavian Stenosis (L) s/p stent presents with ulceration over LUE AVF.   As per patient, she has been feeling well since her discharge from this hospital recently when she was hospitalized for issues with glycemic control, and was diagnosed with COVID at that time, but when seen at the dialysis center yesterday, they advised her to come into the ED.  In the ED, patient with normal vital signs and labs. LUE with AVF with palpable thrill. Small ~ 5mm area of ulceration. denies any ischemic or hf sx at present      Allergies  No Known Allergies      MEDICATIONS:  aspirin enteric coated 81 milliGRAM(s) Oral daily  clopidogrel Tablet 75 milliGRAM(s) Oral daily  heparin   Injectable 5000 Unit(s) SubCutaneous every 8 hours  lisinopril 10 milliGRAM(s) Oral daily  metoprolol succinate ER 50 milliGRAM(s) Oral daily  buDESOnide    Inhalation Suspension 0.5 milliGRAM(s) Inhalation two times a day PRN  acetaminophen  IVPB .. 1000 milliGRAM(s) IV Intermittent once  atorvastatin 20 milliGRAM(s) Oral at bedtime  insulin glargine Injectable (LANTUS) 5 Unit(s) SubCutaneous at bedtime  insulin lispro (HumaLOG) corrective regimen sliding scale   SubCutaneous at bedtime  insulin lispro (HumaLOG) corrective regimen sliding scale   SubCutaneous every 6 hours  silver sulfADIAZINE 1% Cream 1 Application(s) Topical daily  sodium chloride 0.9% Bolus 1000 milliLiter(s) IV Bolus once      PAST MEDICAL & SURGICAL HISTORY:  COPD (chronic obstructive pulmonary disease)  Localized enlarged lymph nodes  CHF (congestive heart failure): EF 40-45%  Subclavian vein stenosis, left: s/p stent  DKA, type 1: 1/2015  ACS (acute coronary syndrome): 1/2015 - cath revealed 100% ostial stenosis not amenable to PCI - medical management  TIA (transient ischemic attack): x 2 - 8-9 years ago prior to ASD/VSD repair  CAD (coronary artery disease): s/p stents  Gout: past  CVA (cerebral infarction): with no residual, 8 yrs ago, prior to heart surgery - ST memory loss  Peripheral vascular disease: occluded left fem-pop bypass 5/2015  Diabetes mellitus type 1: Insulin Dependent -  ESRD (end stage renal disease): dialysis  M, tue, thursday, saturday  Hyperlipidemia  Status post device closure of ASD: &quot;bren[place]&quot;  History of cardiac catheterization: 1/2015 - no intervention  S/P femoral-popliteal bypass surgery: L and R in 2013 with graft; 5/2015 CFA angioplasty left and ileofemoral endarterectomywith vein patch angioplasty of left fem-pop bypass graft  Multiple vascular surgery both leg, left fempop bypass revision 11/2015  AV (arteriovenous fistula): Left AV graft; revision with stent placement 2-3 years ago  S/P cholecystectomy      FAMILY HISTORY:  Family history of smoking  Family history of hypertension  Family history of cancer (Sibling)      SOCIAL HISTORY:    former smoker/ indep in adl      REVIEW OF SYSTEMS:  See HPI, otherwise complete 10 point review of systems negative    [ ] All others negative	  [ ] Unable to obtain    PHYSICAL EXAM:  T(C): 36.5 (05-18-20 @ 06:15), Max: 36.7 (05-17-20 @ 15:42)  HR: 71 (05-18-20 @ 06:15) (71 - 77)  BP: 138/63 (05-18-20 @ 06:15) (138/63 - 156/91)  RR: 18 (05-18-20 @ 06:15) (16 - 18)  SpO2: 96% (05-18-20 @ 06:15) (96% - 99%)  Wt(kg): --  I&O's Summary    17 May 2020 07:01  -  18 May 2020 07:00  --------------------------------------------------------  IN: 1110 mL / OUT: 0 mL / NET: 1110 mL        Appearance: No Acute Distress	  HEENT:  Normal oral mucosa, PERRL, EOMI	  Cardiovascular: Normal S1 S2, No JVD, 2/6 heraclio  Respiratory: Lungs clear to auscultation bilaterally  Gastrointestinal:  Soft, Non-tender, + BS	  Skin: No rashes, No ecchymoses, No cyanosis	  Neurologic: Non-focal  Extremities: No clubbing, cyanosis or edema  Vascular: pulses in le not palpable  Psychiatry: A & O x 3, Mood & affect appropriate    Laboratory Data:	 	    CBC Full  -  ( 17 May 2020 13:41 )  WBC Count : 5.50 K/uL  Hemoglobin : 10.2 g/dL  Hematocrit : 34.7 %  Platelet Count - Automated : 240 K/uL  Mean Cell Volume : 111.9 fl  Mean Cell Hemoglobin : 32.9 pg  Mean Cell Hemoglobin Concentration : 29.4 gm/dL  Auto Neutrophil # : 3.98 K/uL  Auto Lymphocyte # : 0.77 K/uL  Auto Monocyte # : 0.52 K/uL  Auto Eosinophil # : 0.14 K/uL  Auto Basophil # : 0.07 K/uL  Auto Neutrophil % : 72.3 %  Auto Lymphocyte % : 14.0 %  Auto Monocyte % : 9.5 %  Auto Eosinophil % : 2.5 %  Auto Basophil % : 1.3 %    05-18    137  |  94<L>  |  19  ----------------------------<  131<H>  4.9   |  25  |  4.78<H>  05-17    141  |  93<L>  |  16  ----------------------------<  278<H>  4.4   |  29  |  4.12<H>    Ca    9.3      18 May 2020 07:13  Ca    9.8      17 May 2020 13:41  Phos  5.8     05-18  Mg     2.4     05-18    TPro  7.2  /  Alb  4.2  /  TBili  0.3  /  DBili  x   /  AST  18  /  ALT  <5<L>  /  AlkPhos  267<H>  05-17        ECG:  nsr nonspecific st/t changes prolonged qtc	      Assessment:  -cardiac risk stratification  -ischemic cardiomyopathy c/b mod to severe LV dysfunction, cad s/p multiple stents  -AVF ulceration  -severe PAD s/p prior interventions  -s/p novel coronavirus/sars-cov2 infection 4/7/20    -esrd on hd  -copd       Recs:  -patient with significant ICM c/b at least mod LV dysfunction and known unrevascularized CAD, hx of at least moderate valvular disease (MR, AS vs pseudo AS) and NSVT previously declined ICD therapy. her EKG remains at baseline and she denies any ischemic or HF sx. she remains at high risk but is on on optimal medical therapy for CAD and LV dysfunction - including asa, statin, beta blocker and ace -i and can proceed without further cardiac workup  -cont to optimize vol status with hd  -c/w metop and lisinopril for gdmt for lv dysfunction; NSVT and pAT      Greater than 50 minutes spent on total encounter; more than 50% of the visit was spent counseling and/or coordinating care by the attending physician.   	  Julián Biswas MD   Cardiovascular Diseases  [phone]

## 2020-05-19 ENCOUNTER — TRANSCRIPTION ENCOUNTER (OUTPATIENT)
Age: 65
End: 2020-05-19

## 2020-05-19 LAB
ANION GAP SERPL CALC-SCNC: 15 MMOL/L — SIGNIFICANT CHANGE UP (ref 5–17)
BUN SERPL-MCNC: 24 MG/DL — HIGH (ref 7–23)
CALCIUM SERPL-MCNC: 8.7 MG/DL — SIGNIFICANT CHANGE UP (ref 8.4–10.5)
CHLORIDE SERPL-SCNC: 92 MMOL/L — LOW (ref 96–108)
CO2 SERPL-SCNC: 29 MMOL/L — SIGNIFICANT CHANGE UP (ref 22–31)
CREAT SERPL-MCNC: 6.21 MG/DL — HIGH (ref 0.5–1.3)
GLUCOSE BLDC GLUCOMTR-MCNC: 195 MG/DL — HIGH (ref 70–99)
GLUCOSE BLDC GLUCOMTR-MCNC: 213 MG/DL — HIGH (ref 70–99)
GLUCOSE BLDC GLUCOMTR-MCNC: 234 MG/DL — HIGH (ref 70–99)
GLUCOSE BLDC GLUCOMTR-MCNC: 87 MG/DL — SIGNIFICANT CHANGE UP (ref 70–99)
GLUCOSE SERPL-MCNC: 218 MG/DL — HIGH (ref 70–99)
HBV CORE AB SER-ACNC: SIGNIFICANT CHANGE UP
HBV SURFACE AB SER-ACNC: <3 MIU/ML — LOW
HBV SURFACE AB SER-ACNC: SIGNIFICANT CHANGE UP
HBV SURFACE AG SER-ACNC: SIGNIFICANT CHANGE UP
HCT VFR BLD CALC: 31.2 % — LOW (ref 34.5–45)
HCV AB S/CO SERPL IA: 0.15 S/CO — SIGNIFICANT CHANGE UP (ref 0–0.99)
HCV AB SERPL-IMP: SIGNIFICANT CHANGE UP
HGB BLD-MCNC: 9.2 G/DL — LOW (ref 11.5–15.5)
MAGNESIUM SERPL-MCNC: 2.4 MG/DL — SIGNIFICANT CHANGE UP (ref 1.6–2.6)
MCHC RBC-ENTMCNC: 29.5 GM/DL — LOW (ref 32–36)
MCHC RBC-ENTMCNC: 33 PG — SIGNIFICANT CHANGE UP (ref 27–34)
MCV RBC AUTO: 111.8 FL — HIGH (ref 80–100)
NRBC # BLD: 0 /100 WBCS — SIGNIFICANT CHANGE UP (ref 0–0)
PHOSPHATE SERPL-MCNC: 6.3 MG/DL — HIGH (ref 2.5–4.5)
PLATELET # BLD AUTO: 201 K/UL — SIGNIFICANT CHANGE UP (ref 150–400)
POTASSIUM SERPL-MCNC: 5.1 MMOL/L — SIGNIFICANT CHANGE UP (ref 3.5–5.3)
POTASSIUM SERPL-SCNC: 5.1 MMOL/L — SIGNIFICANT CHANGE UP (ref 3.5–5.3)
RBC # BLD: 2.79 M/UL — LOW (ref 3.8–5.2)
RBC # FLD: 16.2 % — HIGH (ref 10.3–14.5)
SODIUM SERPL-SCNC: 136 MMOL/L — SIGNIFICANT CHANGE UP (ref 135–145)
WBC # BLD: 4.89 K/UL — SIGNIFICANT CHANGE UP (ref 3.8–10.5)
WBC # FLD AUTO: 4.89 K/UL — SIGNIFICANT CHANGE UP (ref 3.8–10.5)

## 2020-05-19 RX ORDER — ERYTHROPOIETIN 10000 [IU]/ML
10000 INJECTION, SOLUTION INTRAVENOUS; SUBCUTANEOUS ONCE
Refills: 0 | Status: COMPLETED | OUTPATIENT
Start: 2020-05-19 | End: 2020-05-19

## 2020-05-19 RX ADMIN — CLOPIDOGREL BISULFATE 75 MILLIGRAM(S): 75 TABLET, FILM COATED ORAL at 12:29

## 2020-05-19 RX ADMIN — HEPARIN SODIUM 5000 UNIT(S): 5000 INJECTION INTRAVENOUS; SUBCUTANEOUS at 22:44

## 2020-05-19 RX ADMIN — Medication 4: at 08:11

## 2020-05-19 RX ADMIN — ATORVASTATIN CALCIUM 20 MILLIGRAM(S): 80 TABLET, FILM COATED ORAL at 22:44

## 2020-05-19 RX ADMIN — SEVELAMER CARBONATE 2400 MILLIGRAM(S): 2400 POWDER, FOR SUSPENSION ORAL at 12:29

## 2020-05-19 RX ADMIN — HEPARIN SODIUM 5000 UNIT(S): 5000 INJECTION INTRAVENOUS; SUBCUTANEOUS at 12:33

## 2020-05-19 RX ADMIN — SEVELAMER CARBONATE 2400 MILLIGRAM(S): 2400 POWDER, FOR SUSPENSION ORAL at 22:44

## 2020-05-19 RX ADMIN — Medication 50 MILLIGRAM(S): at 04:57

## 2020-05-19 RX ADMIN — SEVELAMER CARBONATE 2400 MILLIGRAM(S): 2400 POWDER, FOR SUSPENSION ORAL at 08:11

## 2020-05-19 RX ADMIN — INSULIN GLARGINE 5 UNIT(S): 100 INJECTION, SOLUTION SUBCUTANEOUS at 22:50

## 2020-05-19 RX ADMIN — ERYTHROPOIETIN 10000 UNIT(S): 10000 INJECTION, SOLUTION INTRAVENOUS; SUBCUTANEOUS at 15:06

## 2020-05-19 RX ADMIN — LISINOPRIL 10 MILLIGRAM(S): 2.5 TABLET ORAL at 04:57

## 2020-05-19 RX ADMIN — OXYCODONE AND ACETAMINOPHEN 1 TABLET(S): 5; 325 TABLET ORAL at 05:51

## 2020-05-19 RX ADMIN — Medication 2: at 12:26

## 2020-05-19 RX ADMIN — Medication 81 MILLIGRAM(S): at 12:29

## 2020-05-19 RX ADMIN — HEPARIN SODIUM 5000 UNIT(S): 5000 INJECTION INTRAVENOUS; SUBCUTANEOUS at 04:57

## 2020-05-19 NOTE — DISCHARGE NOTE PROVIDER - NSDCMRMEDTOKEN_GEN_ALL_CORE_FT
acetaminophen 325 mg oral tablet: 2 tab(s) orally every 6 hours, As needed, Temp greater or equal to 38.5C (101.3F), Mild Pain (1 - 3)  aspirin 81 mg oral delayed release tablet: 1 tab(s) orally once a day  atorvastatin 20 mg oral tablet: 1 tab(s) orally once a day  Basaglar KwikPen 100 units/mL subcutaneous solution: 10 unit(s) subcutaneous once a day (at bedtime)  budesonide 0.5 mg/2 mL inhalation suspension: 2 milliliter(s) inhaled 2 times a day, As Needed  clopidogrel 75 mg oral tablet: 1 tab(s) orally once a day  lisinopril 10 mg oral tablet: 1 tab(s) orally once a day   NovoLOG FlexPen 100 units/mL injectable solution: 2 unit(s) subcutaneous 3 times a day (before meals), As Needed  oxycodone-acetaminophen 5 mg-325 mg oral tablet: 1 tab(s) orally every 6 hours, As Needed -Moderate Pain (4 - 6) MDD:4  sevelamer carbonate 800 mg oral tablet: 3 tab(s) orally 3 times a day (with meals)  silver sulfADIAZINE 1% topical cream: 1 application topically once a day  Toprol-XL 50 mg oral tablet, extended release: 1 tab(s) orally once a day (at bedtime)  Veltassa 8.4 g oral powder for reconstitution: 8.4 gram(s) orally once a day  Note: pt noncompliant

## 2020-05-19 NOTE — DISCHARGE NOTE PROVIDER - NSDCFUADDINST_GEN_ALL_CORE_FT
Please follow up with your primary care doctor within 1-2 weeks of hospitalization.   Please follow up with your cardiologist and nephrologist within 1-2 weeks.   Please continue dialysis per routine.

## 2020-05-19 NOTE — DISCHARGE NOTE PROVIDER - PROVIDER TOKENS
PROVIDER:[TOKEN:[[id number]],FOLLOWUP:[1 week]],PROVIDER:[TOKEN:[[id number]]],PROVIDER:[TOKEN:[[id number]]]

## 2020-05-19 NOTE — DISCHARGE NOTE PROVIDER - NSDCACTIVITY_GEN_ALL_CORE
Do not drive or operate machinery/Do not drive or operate machinery when taking pain medications./Showering allowed/Walking - Outdoors allowed/Walking - Indoors allowed/No heavy lifting/straining

## 2020-05-19 NOTE — DISCHARGE NOTE PROVIDER - CARE PROVIDER_API CALL
Lance Elizalde  SURGERY  [address]  [place] [address]  Phone: [phone]  Fax: [phone]  Follow Up Time: 1 week    Daisy Burger  NEPHROLOGY  [address]  [address]  [place] [address]  Phone: [phone]  Fax: [phone]  Follow Up Time:     Julián Biswas  INTERNAL MEDICINE  [address] [place] [address]  Phone: [phone]  Fax: [phone]  Follow Up Time:

## 2020-05-19 NOTE — CHART NOTE - NSCHARTNOTEFT_GEN_A_CORE
Pt seen and examined at beside. She is laying comfortably in bed. She tolerated her HD treatment. Pt has no complaints.     Physical Exam:  General: A&Ox3, NAD, resting comfortably in bed  Pulmonary: Nonlabored breathing, no respiratory distress  Cardiovascular: RRR  Abdominal: soft, nontender, nondistended  Vascular: Hand warm well perfused , +Left AV fistula with palpable thrill, Left radial pulse intact, dressing clean, dry and intact      Plan: Pt will be discharged tomorrow morning due to transportation issues

## 2020-05-19 NOTE — DISCHARGE NOTE PROVIDER - NSDCCPCAREPLAN_GEN_ALL_CORE_FT
PRINCIPAL DISCHARGE DIAGNOSIS  Diagnosis: Infection of arteriovenous fistula, initial encounter  Assessment and Plan of Treatment: Recovering from surgery     BATHING: Please do not submerge wound underwater. You may shower and/or sponge bathe.  ACTIVITY: No heavy lifting anything more than 10-15lbs or straining. Otherwise, you may return to your usual level of physical activity. If you are taking narcotic pain medication (such as oxycodone or Percocet), do NOT drive a car, operate machinery or make important decisions.  NOTIFY YOUR SURGEON IF:  Any pus draining from your wound, profuse bleeing from wound, any fever (over 100.4 F) or chills, redness around the wound or extremity that spreads, excessive swelling or acute temperatue changes, numbness/tingling or if your pain is not controlled on your discharge pain medications.  FOLLOW-UP:  1. Please call to make a follow-up appointment in 1-2 weeks upon discharge from the hospital  2. Please follow up with your primary care physician in one week regarding your hospitalization.        SECONDARY DISCHARGE DIAGNOSES  Diagnosis: COVID-19  Assessment and Plan of Treatment: Quarentine yourself for 14 days   Please follow up with your PCP in 1-2 weeks   Call your Provider before hand   Take Tylenol for Fevers every 6 hours as needed  Stay Hydrated   WEAR A FACE MASK   Cover your cough and Sneezes   Clean your hands often   Avoid sharing  personal House Hold  items   clean all high touch surfaces, Everyday items like table tops , door knobs, cell phones etc   You should restrict activities outside your home except for getting medical care   Avoid using Public transportation  Do not go to work, school, or Public areas   Monitor your oxygen saturation   Call [facility] at [phone]    Diagnosis: ESRD (end stage renal disease) on dialysis  Assessment and Plan of Treatment: Continue with dialysis per your nephrologist, Dr. Burger. Please follow up in office with Dr. Burger. Call the office to schedule an appointment.

## 2020-05-19 NOTE — DISCHARGE NOTE PROVIDER - CARE PROVIDERS DIRECT ADDRESSES
,[email or web address],DirectAddress_Unknown,DirectAddress_Unknown

## 2020-05-20 ENCOUNTER — TRANSCRIPTION ENCOUNTER (OUTPATIENT)
Age: 65
End: 2020-05-20

## 2020-05-20 VITALS
OXYGEN SATURATION: 96 % | DIASTOLIC BLOOD PRESSURE: 74 MMHG | HEART RATE: 77 BPM | TEMPERATURE: 97 F | SYSTOLIC BLOOD PRESSURE: 158 MMHG | RESPIRATION RATE: 20 BRPM

## 2020-05-20 LAB
GLUCOSE BLDC GLUCOMTR-MCNC: 399 MG/DL — HIGH (ref 70–99)
SURGICAL PATHOLOGY STUDY: SIGNIFICANT CHANGE UP

## 2020-05-20 RX ADMIN — Medication 10: at 08:24

## 2020-05-20 RX ADMIN — OXYCODONE AND ACETAMINOPHEN 1 TABLET(S): 5; 325 TABLET ORAL at 00:41

## 2020-05-20 RX ADMIN — LISINOPRIL 10 MILLIGRAM(S): 2.5 TABLET ORAL at 06:40

## 2020-05-20 RX ADMIN — HEPARIN SODIUM 5000 UNIT(S): 5000 INJECTION INTRAVENOUS; SUBCUTANEOUS at 06:40

## 2020-05-20 RX ADMIN — Medication 50 MILLIGRAM(S): at 06:40

## 2020-05-20 RX ADMIN — SEVELAMER CARBONATE 2400 MILLIGRAM(S): 2400 POWDER, FOR SUSPENSION ORAL at 08:25

## 2020-05-20 NOTE — DISCHARGE NOTE NURSING/CASE MANAGEMENT/SOCIAL WORK - PATIENT PORTAL LINK FT
You can access the FollowMyHealth Patient Portal offered by [facility] by registering at the following website: [email or web address]. By joining [employer]’s FollowMyHealth portal, you will also be able to view your health information using other applications (apps) compatible with our system.

## 2020-05-20 NOTE — PROGRESS NOTE ADULT - PROVIDER SPECIALTY LIST ADULT
Cardiology
Internal Medicine
Internal Medicine
Nephrology
Nephrology
Surgery
Vascular Surgery
Vascular Surgery
Internal Medicine

## 2020-05-20 NOTE — PROGRESS NOTE ADULT - SUBJECTIVE AND OBJECTIVE BOX
Patient is a 64y old  Female who presents with a chief complaint of AVF revision (18 May 2020 08:51)      SUBJECTIVE / OVERNIGHT EVENTS: s/p AVF pseudoanevrism repair.  Review of Systems  chest pain no  palpitations no  sob no  nausea no  headache no    MEDICATIONS  (STANDING):  acetaminophen  IVPB .. 1000 milliGRAM(s) IV Intermittent once  aspirin enteric coated 81 milliGRAM(s) Oral daily  atorvastatin 20 milliGRAM(s) Oral at bedtime  clopidogrel Tablet 75 milliGRAM(s) Oral daily  heparin   Injectable 5000 Unit(s) SubCutaneous every 8 hours  insulin glargine Injectable (LANTUS) 5 Unit(s) SubCutaneous at bedtime  insulin lispro (HumaLOG) corrective regimen sliding scale   SubCutaneous at bedtime  insulin lispro (HumaLOG) corrective regimen sliding scale   SubCutaneous every 6 hours  lisinopril 10 milliGRAM(s) Oral daily  metoprolol succinate ER 50 milliGRAM(s) Oral daily  sevelamer carbonate 2400 milliGRAM(s) Oral three times a day with meals  silver sulfADIAZINE 1% Cream 1 Application(s) Topical daily  sodium chloride 0.9% Bolus 1000 milliLiter(s) IV Bolus once    MEDICATIONS  (PRN):  buDESOnide    Inhalation Suspension 0.5 milliGRAM(s) Inhalation two times a day PRN wheezing  oxycodone    5 mG/acetaminophen 325 mG 1 Tablet(s) Oral every 4 hours PRN Moderate Pain (4 - 6)      Vital Signs Last 24 Hrs  T(C): 36.6 (18 May 2020 16:40), Max: 36.6 (18 May 2020 09:28)  T(F): 97.8 (18 May 2020 16:40), Max: 97.9 (18 May 2020 09:28)  HR: 66 (18 May 2020 16:40) (60 - 72)  BP: 111/70 (18 May 2020 16:40) (111/70 - 177/80)  BP(mean): --  RR: 18 (18 May 2020 16:40) (16 - 18)  SpO2: 92% (18 May 2020 16:40) (92% - 100%)    PHYSICAL EXAM:  GENERAL: NAD, well-developed  HEAD:  Atraumatic, Normocephalic  EYES: EOMI, PERRLA, conjunctiva and sclera clear  NECK: Supple, No JVD  CHEST/LUNG: Clear to auscultation bilaterally; No wheeze  HEART: Regular rate and rhythm; No murmurs, rubs, or gallops  ABDOMEN: Soft, Nontender, Nondistended; Bowel sounds present  EXTREMITIES:  L arm with clean dressing  PSYCH: AAOx3  NEUROLOGY: non-focal  SKIN: No rashes or lesions    LABS:                        10.7   4.30  )-----------( 224      ( 18 May 2020 09:44 )             36.2     05-18    137  |  94<L>  |  19  ----------------------------<  131<H>  4.9   |  25  |  4.78<H>    Ca    9.3      18 May 2020 07:13  Phos  5.8     05-18  Mg     2.4     05-18    TPro  7.2  /  Alb  4.2  /  TBili  0.3  /  DBili  x   /  AST  18  /  ALT  <5<L>  /  AlkPhos  267<H>  05-17    PT/INR - ( 17 May 2020 13:41 )   PT: 13.1 sec;   INR: 1.14 ratio         PTT - ( 17 May 2020 13:41 )  PTT:33.7 sec          Culture - Blood (collected 17 May 2020 16:43)  Source: .Blood Blood-Peripheral  Preliminary Report (18 May 2020 17:02):    No growth to date.    Culture - Blood (collected 17 May 2020 16:43)  Source: .Blood Blood-Peripheral  Preliminary Report (18 May 2020 17:02):    No growth to date.        RADIOLOGY & ADDITIONAL TESTS:    Imaging Personally Reviewed:    Consultant(s) Notes Reviewed:      Care Discussed with Consultants/Other Providers:
Cardiovascular Disease Progress Note    Overnight events: No acute events overnight.  feels well. no postop cardiac events noted  Otherwise review of systems negative    Objective Findings:  T(C): 36.7 (05-20-20 @ 04:15), Max: 37.1 (05-20-20 @ 00:45)  HR: 75 (05-20-20 @ 04:15) (60 - 88)  BP: 158/75 (05-20-20 @ 04:15) (141/89 - 163/74)  RR: 20 (05-20-20 @ 04:15) (18 - 20)  SpO2: 100% (05-20-20 @ 04:15) (92% - 100%)  Wt(kg): --  Daily     Daily       Physical Exam:  Gen: NAD  HEENT: EOMI  CV: RRR, normal S1 + S2, no m/r/g  Lungs: CTAB  Abd: soft, non-tender  Ext: mild edema      Laboratory Data:                        9.2    4.89  )-----------( 201      ( 19 May 2020 06:07 )             31.2     05-19    136  |  92<L>  |  24<H>  ----------------------------<  218<H>  5.1   |  29  |  6.21<H>    Ca    8.7      19 May 2020 06:07  Phos  6.3     05-19  Mg     2.4     05-19                Inpatient Medications:  MEDICATIONS  (STANDING):  acetaminophen  IVPB .. 1000 milliGRAM(s) IV Intermittent once  aspirin enteric coated 81 milliGRAM(s) Oral daily  atorvastatin 20 milliGRAM(s) Oral at bedtime  clopidogrel Tablet 75 milliGRAM(s) Oral daily  heparin   Injectable 5000 Unit(s) SubCutaneous every 8 hours  insulin glargine Injectable (LANTUS) 5 Unit(s) SubCutaneous at bedtime  insulin lispro (HumaLOG) corrective regimen sliding scale   SubCutaneous at bedtime  insulin lispro (HumaLOG) corrective regimen sliding scale   SubCutaneous three times a day before meals  lisinopril 10 milliGRAM(s) Oral daily  metoprolol succinate ER 50 milliGRAM(s) Oral daily  sevelamer carbonate 2400 milliGRAM(s) Oral three times a day with meals      Assessment:  -cardiac risk stratification  -ischemic cardiomyopathy c/b mod to severe LV dysfunction, cad s/p multiple stents  -AVF ulceration  -severe PAD s/p prior interventions  -s/p novel coronavirus/sars-cov2 infection 4/7/20    -esrd on hd  -copd       Recs:  -no postop cardiac events noted  -cont to optimize vol status with hd  -c/w metop and lisinopril for gdmt for lv dysfunction; NSVT and pAT  -cw dapt and statin for cad/stents, pad        Over 25 minutes spent on total encounter; more than 50% of the visit was spent counseling and/or coordinating care by the attending physician.      Julián Biswas MD   Cardiovascular Disease  [phone]
[facility]   [phone]  RENAL FOLLOW UP NOTE  --------------------------------------------------------------------------------  Chief Complaint:    24 hour events/subjective:    seen earlier. stated had bleeding over her avf site but stopped         PAST HISTORY  --------------------------------------------------------------------------------  No significant changes to PMH, PSH, FHx, SHx, unless otherwise noted    ALLERGIES & MEDICATIONS  --------------------------------------------------------------------------------  Allergies    No Known Allergies    Intolerances      Standing Inpatient Medications  acetaminophen  IVPB .. 1000 milliGRAM(s) IV Intermittent once  aspirin enteric coated 81 milliGRAM(s) Oral daily  atorvastatin 20 milliGRAM(s) Oral at bedtime  clopidogrel Tablet 75 milliGRAM(s) Oral daily  heparin   Injectable 5000 Unit(s) SubCutaneous every 8 hours  insulin glargine Injectable (LANTUS) 5 Unit(s) SubCutaneous at bedtime  insulin lispro (HumaLOG) corrective regimen sliding scale   SubCutaneous at bedtime  insulin lispro (HumaLOG) corrective regimen sliding scale   SubCutaneous three times a day before meals  lisinopril 10 milliGRAM(s) Oral daily  metoprolol succinate ER 50 milliGRAM(s) Oral daily  sevelamer carbonate 2400 milliGRAM(s) Oral three times a day with meals    PRN Inpatient Medications  buDESOnide    Inhalation Suspension 0.5 milliGRAM(s) Inhalation two times a day PRN  oxycodone    5 mG/acetaminophen 325 mG 1 Tablet(s) Oral every 4 hours PRN      REVIEW OF SYSTEMS  --------------------------------------------------------------------------------    Gen: denies fevers/chills,  CVS: denies chest pain/palpitations  Resp: denies SOB/Cough  GI: Denies N/V/Abd pain  : Denies dysuria    All other systems were reviewed and are negative, except as noted.    VITALS/PHYSICAL EXAM  --------------------------------------------------------------------------------  T(C): 36.3 (05-20-20 @ 09:48), Max: 37.1 (05-20-20 @ 00:45)  HR: 77 (05-20-20 @ 09:48) (74 - 86)  BP: 158/74 (05-20-20 @ 09:48) (156/72 - 163/74)  RR: 20 (05-20-20 @ 09:48) (20 - 20)  SpO2: 96% (05-20-20 @ 09:48) (92% - 100%)  Wt(kg): --        05-19-20 @ 07:01  -  05-20-20 @ 07:00  --------------------------------------------------------  IN: 1820 mL / OUT: 3800 mL / NET: -1980 mL      Physical Exam:  	  Gen: Non toxic comfortable appearing   	no jvd  	Pulm: decrease bs  no rales or ronchi or wheezing  	CV: RRR, S1S2; no rub  	Abd: +BS, soft, nontender/nondistended  	: No suprapubic tenderness  	UE: Warm, no cyanosis  no clubbing,  no edema   	LE: Warm, no cyanosis  no clubbing, 2+ LLE RLE trace  edema  	Neuro: alert and oriented.   	Skin: Warm, no decrease skin turgor   	Vascular access: + avf + bruit and thrill + dressing over avf C/D      LABS/STUDIES  --------------------------------------------------------------------------------              9.2    4.89  >-----------<  201      [05-19-20 @ 06:07]              31.2     136  |  92  |  24  ----------------------------<  218      [05-19-20 @ 06:07]  5.1   |  29  |  6.21        Ca     8.7     [05-19-20 @ 06:07]      Mg     2.4     [05-19-20 @ 06:07]      Phos  6.3     [05-19-20 @ 06:07]            Creatinine Trend:  SCr 6.21 [05-19 @ 06:07]  SCr 4.78 [05-18 @ 07:13]  SCr 4.12 [05-17 @ 13:41]  SCr 6.29 [05-07 @ 08:24]  SCr 5.02 [05-06 @ 10:16]                  Iron 67, TIBC 234, %sat 29      [12-04-19 @ 08:38]  Ferritin 1387      [05-06-20 @ 12:17]  PTH -- (Ca 8.5)      [05-04-20 @ 04:39]   1317  PTH -- (Ca 8.3)      [12-04-19 @ 08:38]   952  PTH -- (Ca 9.6)      [06-17-19 @ 18:06]   177  HbA1c 7.7      [04-08-20 @ 08:03]  TSH 1.78      [11-14-19 @ 09:15]  Lipid: chol 108, TG 76, HDL 57, LDL 36      [11-14-19 @ 09:16]
Patient is a 64y old  Female who presents with a chief complaint of AVF revision (19 May 2020 19:18)      SUBJECTIVE / OVERNIGHT EVENTS: Comfortable without new complaints.   Review of Systems  chest pain no  palpitations no  sob no  nausea no  headache no    MEDICATIONS  (STANDING):  acetaminophen  IVPB .. 1000 milliGRAM(s) IV Intermittent once  aspirin enteric coated 81 milliGRAM(s) Oral daily  atorvastatin 20 milliGRAM(s) Oral at bedtime  clopidogrel Tablet 75 milliGRAM(s) Oral daily  heparin   Injectable 5000 Unit(s) SubCutaneous every 8 hours  insulin glargine Injectable (LANTUS) 5 Unit(s) SubCutaneous at bedtime  insulin lispro (HumaLOG) corrective regimen sliding scale   SubCutaneous at bedtime  insulin lispro (HumaLOG) corrective regimen sliding scale   SubCutaneous three times a day before meals  lisinopril 10 milliGRAM(s) Oral daily  metoprolol succinate ER 50 milliGRAM(s) Oral daily  sevelamer carbonate 2400 milliGRAM(s) Oral three times a day with meals    MEDICATIONS  (PRN):  buDESOnide    Inhalation Suspension 0.5 milliGRAM(s) Inhalation two times a day PRN wheezing  oxycodone    5 mG/acetaminophen 325 mG 1 Tablet(s) Oral every 4 hours PRN Moderate Pain (4 - 6)      Vital Signs Last 24 Hrs  T(C): 36.4 (19 May 2020 18:22), Max: 37 (19 May 2020 09:12)  T(F): 97.5 (19 May 2020 18:22), Max: 98.6 (19 May 2020 09:12)  HR: 88 (19 May 2020 18:22) (60 - 88)  BP: 163/71 (19 May 2020 18:22) (135/74 - 168/74)  BP(mean): --  RR: 19 (19 May 2020 18:22) (18 - 20)  SpO2: 94% (19 May 2020 18:22) (94% - 99%)    PHYSICAL EXAM:  GENERAL: NAD, well-developed  HEAD:  Atraumatic, Normocephalic  EYES: EOMI, PERRLA, conjunctiva and sclera clear  NECK: Supple, No JVD  CHEST/LUNG: Clear to auscultation bilaterally; No wheeze  HEART: Regular rate and rhythm; No murmurs, rubs, or gallops  ABDOMEN: Soft, Nontender, Nondistended; Bowel sounds present  EXTREMITIES:  L arm AVF with clean dressing  PSYCH: AAOx3  NEUROLOGY: non-focal  SKIN: No rashes or lesions    LABS:                        9.2    4.89  )-----------( 201      ( 19 May 2020 06:07 )             31.2     05-19    136  |  92<L>  |  24<H>  ----------------------------<  218<H>  5.1   |  29  |  6.21<H>    Ca    8.7      19 May 2020 06:07  Phos  6.3     05-19  Mg     2.4     05-19                Culture - Blood (collected 17 May 2020 16:43)  Source: .Blood Blood-Peripheral  Preliminary Report (18 May 2020 17:02):    No growth to date.    Culture - Blood (collected 17 May 2020 16:43)  Source: .Blood Blood-Peripheral  Preliminary Report (18 May 2020 17:02):    No growth to date.        RADIOLOGY & ADDITIONAL TESTS:    Imaging Personally Reviewed:    Consultant(s) Notes Reviewed:      Care Discussed with Consultants/Other Providers:
Patient is a 64y old  Female who presents with a chief complaint of AVF revision (20 May 2020 08:26)      SUBJECTIVE / OVERNIGHT EVENTS: Comfortable without new complaints.   Review of Systems  chest pain no  palpitations no  sob no  nausea no  headache no    MEDICATIONS  (STANDING):  acetaminophen  IVPB .. 1000 milliGRAM(s) IV Intermittent once  aspirin enteric coated 81 milliGRAM(s) Oral daily  atorvastatin 20 milliGRAM(s) Oral at bedtime  clopidogrel Tablet 75 milliGRAM(s) Oral daily  heparin   Injectable 5000 Unit(s) SubCutaneous every 8 hours  insulin glargine Injectable (LANTUS) 5 Unit(s) SubCutaneous at bedtime  insulin lispro (HumaLOG) corrective regimen sliding scale   SubCutaneous at bedtime  insulin lispro (HumaLOG) corrective regimen sliding scale   SubCutaneous three times a day before meals  lisinopril 10 milliGRAM(s) Oral daily  metoprolol succinate ER 50 milliGRAM(s) Oral daily  sevelamer carbonate 2400 milliGRAM(s) Oral three times a day with meals    MEDICATIONS  (PRN):  buDESOnide    Inhalation Suspension 0.5 milliGRAM(s) Inhalation two times a day PRN wheezing  oxycodone    5 mG/acetaminophen 325 mG 1 Tablet(s) Oral every 4 hours PRN Moderate Pain (4 - 6)      Vital Signs Last 24 Hrs  T(C): 36.3 (20 May 2020 09:48), Max: 37.1 (20 May 2020 00:45)  T(F): 97.4 (20 May 2020 09:48), Max: 98.8 (20 May 2020 00:45)  HR: 77 (20 May 2020 09:48) (60 - 88)  BP: 158/74 (20 May 2020 09:48) (150/80 - 163/74)  BP(mean): --  RR: 20 (20 May 2020 09:48) (18 - 20)  SpO2: 96% (20 May 2020 09:48) (92% - 100%)    PHYSICAL EXAM:  GENERAL: NAD, well-developed  HEAD:  Atraumatic, Normocephalic  EYES: EOMI, PERRLA, conjunctiva and sclera clear  NECK: Supple, No JVD  CHEST/LUNG: Clear to auscultation bilaterally; No wheeze  HEART: Regular rate and rhythm; No murmurs, rubs, or gallops  ABDOMEN: Soft, Nontender, Nondistended; Bowel sounds present  EXTREMITIES:  2+ Peripheral Pulses, No clubbing, cyanosis, or edema L arm wound with clean dressing  PSYCH: AAOx3  NEUROLOGY: non-focal  SKIN: No rashes or lesions    LABS:                        9.2    4.89  )-----------( 201      ( 19 May 2020 06:07 )             31.2     05-19    136  |  92<L>  |  24<H>  ----------------------------<  218<H>  5.1   |  29  |  6.21<H>    Ca    8.7      19 May 2020 06:07  Phos  6.3     05-19  Mg     2.4     05-19                Culture - Blood (collected 17 May 2020 16:43)  Source: .Blood Blood-Peripheral  Preliminary Report (18 May 2020 17:02):    No growth to date.    Culture - Blood (collected 17 May 2020 16:43)  Source: .Blood Blood-Peripheral  Preliminary Report (18 May 2020 17:02):    No growth to date.        RADIOLOGY & ADDITIONAL TESTS:    Imaging Personally Reviewed:    Consultant(s) Notes Reviewed:      Care Discussed with Consultants/Other Providers:
SURGERY DAILY PROGRESS NOTE:       SUBJECTIVE/ROS: Patient seen and evaluated at the bedside. POD1 from L AVF revision. Patient doing well. Patient with no acute complaints. Eager to go home. Denies pain. Endorses sensation and motor on LUE. Denies nausea, vomiting, chest pain, shortness of breath       OBJECTIVE:    Vital Signs Last 24 Hrs  T(C): 36.6 (19 May 2020 04:56), Max: 36.7 (18 May 2020 20:45)  T(F): 97.9 (19 May 2020 04:56), Max: 98.1 (18 May 2020 22:04)  HR: 69 (19 May 2020 04:56) (60 - 76)  BP: 135/74 (19 May 2020 04:56) (111/70 - 177/80)  BP(mean): --  RR: 18 (19 May 2020 04:56) (16 - 18)  SpO2: 95% (19 May 2020 04:56) (92% - 100%)  I&O's Detail    18 May 2020 07:01  -  19 May 2020 07:00  --------------------------------------------------------  IN:    IV PiggyBack: 250 mL    Oral Fluid: 150 mL  Total IN: 400 mL    OUT:  Total OUT: 0 mL    Total NET: 400 mL        Daily     Daily   MEDICATIONS  (STANDING):  acetaminophen  IVPB .. 1000 milliGRAM(s) IV Intermittent once  aspirin enteric coated 81 milliGRAM(s) Oral daily  atorvastatin 20 milliGRAM(s) Oral at bedtime  clopidogrel Tablet 75 milliGRAM(s) Oral daily  epoetin-azalea-epbx (RETACRIT) Injectable 21596 Unit(s) IV Push once  heparin   Injectable 5000 Unit(s) SubCutaneous every 8 hours  insulin glargine Injectable (LANTUS) 5 Unit(s) SubCutaneous at bedtime  insulin lispro (HumaLOG) corrective regimen sliding scale   SubCutaneous at bedtime  insulin lispro (HumaLOG) corrective regimen sliding scale   SubCutaneous three times a day before meals  lisinopril 10 milliGRAM(s) Oral daily  metoprolol succinate ER 50 milliGRAM(s) Oral daily  sevelamer carbonate 2400 milliGRAM(s) Oral three times a day with meals    MEDICATIONS  (PRN):  buDESOnide    Inhalation Suspension 0.5 milliGRAM(s) Inhalation two times a day PRN wheezing  oxycodone    5 mG/acetaminophen 325 mG 1 Tablet(s) Oral every 4 hours PRN Moderate Pain (4 - 6)      LABS:                        9.2    4.89  )-----------( 201      ( 19 May 2020 06:07 )             31.2     05-19    136  |  92<L>  |  24<H>  ----------------------------<  218<H>  5.1   |  29  |  6.21<H>    Ca    8.7      19 May 2020 06:07  Phos  6.3     05-19  Mg     2.4     05-19    TPro  7.2  /  Alb  4.2  /  TBili  0.3  /  DBili  x   /  AST  18  /  ALT  <5<L>  /  AlkPhos  267<H>  05-17    PT/INR - ( 17 May 2020 13:41 )   PT: 13.1 sec;   INR: 1.14 ratio         PTT - ( 17 May 2020 13:41 )  PTT:33.7 sec      Physical Exam:  General: NAD, resting comfortably in bed  Pulmonary: Nonlabored breathing, no respiratory distress  Cardiovascular: RRR  Abdominal: soft, nontender, nondistended  Vascular: Left AV fistula with palpable thrill. Left radial pulse intact. Hand warm.
SURGERY PROGRESS NOTE    INTERVAL HPI/OVERNIGHT EVENTS: No acute events overnight. Feels ready to go to the OR today.    Vital Signs Last 24 Hrs  T(C): 36.3 (17 May 2020 20:49), Max: 36.7 (17 May 2020 15:42)  T(F): 97.4 (17 May 2020 20:49), Max: 98 (17 May 2020 15:42)  HR: 72 (17 May 2020 20:49) (72 - 77)  BP: 138/77 (17 May 2020 20:49) (138/77 - 156/91)  BP(mean): --  RR: 18 (17 May 2020 20:49) (16 - 18)  SpO2: 99% (17 May 2020 20:49) (99% - 99%)  MEDICATIONS  (STANDING):  aspirin enteric coated 81 milliGRAM(s) Oral daily  atorvastatin 20 milliGRAM(s) Oral at bedtime  clopidogrel Tablet 75 milliGRAM(s) Oral daily  heparin   Injectable 5000 Unit(s) SubCutaneous every 8 hours  insulin glargine Injectable (LANTUS) 5 Unit(s) SubCutaneous at bedtime  insulin lispro (HumaLOG) corrective regimen sliding scale   SubCutaneous at bedtime  insulin lispro (HumaLOG) corrective regimen sliding scale   SubCutaneous every 6 hours  lisinopril 10 milliGRAM(s) Oral daily  metoprolol succinate ER 50 milliGRAM(s) Oral daily  sevelamer carbonate 2400 milliGRAM(s) Oral three times a day with meals  silver sulfADIAZINE 1% Cream 1 Application(s) Topical daily  sodium chloride 0.9% Bolus 1000 milliLiter(s) IV Bolus once    MEDICATIONS  (PRN):  buDESOnide    Inhalation Suspension 0.5 milliGRAM(s) Inhalation two times a day PRN wheezing      PHYSICAL EXAM    General: NAD, Lying in bed comfortably  Neuro: AAO  Resp: Good effort, no wheezing  Musculoskeletal: All 4 extremities moving spontaneously, no limitations  Vascular: LUE with ulceration over AVF. No active bleeding. Non-tender. Thrill    I&O:  I&O's Detail    17 May 2020 07:01  -  18 May 2020 05:17  --------------------------------------------------------  IN:    IV PiggyBack: 250 mL    Oral Fluid: 360 mL    Sodium Chloride 0.9% IV Bolus: 500 mL  Total IN: 1110 mL    OUT:  Total OUT: 0 mL    Total NET: 1110 mL          LABS:                        10.2   5.50  )-----------( 240      ( 17 May 2020 13:41 )             34.7     05-17    141  |  93<L>  |  16  ----------------------------<  278<H>  4.4   |  29  |  4.12<H>    Ca    9.8      17 May 2020 13:41    TPro  7.2  /  Alb  4.2  /  TBili  0.3  /  DBili  x   /  AST  18  /  ALT  <5<L>  /  AlkPhos  267<H>  05-17    LIVER FUNCTIONS - ( 17 May 2020 13:41 )  Alb: 4.2 g/dL / Pro: 7.2 g/dL / ALK PHOS: 267 U/L / ALT: <5 U/L / AST: 18 U/L / GGT: x           PT/INR - ( 17 May 2020 13:41 )   PT: 13.1 sec;   INR: 1.14 ratio         PTT - ( 17 May 2020 13:41 )  PTT:33.7 sec          Impression: 64F with AVF ulceration, hemodynamically stable.    Plan:  OR today for AVF revision  Consent signed and in chart  Will possibly need additional HD while in OR if AVF unusable immediately post-op  Pain control
[facility]   [phone]  DIALYSIS NOTE  Chief Complaint: ESRD/Ongoing hemodialysis requirement.    24 hour events/subjective:    states feels better   no new c/o           ALLERGIES & MEDICATIONS  --------------------------------------------------------------------------------  Allergies    No Known Allergies    Intolerances      Standing Inpatient Medications  acetaminophen  IVPB .. 1000 milliGRAM(s) IV Intermittent once  aspirin enteric coated 81 milliGRAM(s) Oral daily  atorvastatin 20 milliGRAM(s) Oral at bedtime  clopidogrel Tablet 75 milliGRAM(s) Oral daily  heparin   Injectable 5000 Unit(s) SubCutaneous every 8 hours  insulin glargine Injectable (LANTUS) 5 Unit(s) SubCutaneous at bedtime  insulin lispro (HumaLOG) corrective regimen sliding scale   SubCutaneous at bedtime  insulin lispro (HumaLOG) corrective regimen sliding scale   SubCutaneous three times a day before meals  lisinopril 10 milliGRAM(s) Oral daily  metoprolol succinate ER 50 milliGRAM(s) Oral daily  sevelamer carbonate 2400 milliGRAM(s) Oral three times a day with meals    PRN Inpatient Medications  buDESOnide    Inhalation Suspension 0.5 milliGRAM(s) Inhalation two times a day PRN  oxycodone    5 mG/acetaminophen 325 mG 1 Tablet(s) Oral every 4 hours PRN      REVIEW OF SYSTEMS  --------------------------------------------------------------------------------    no fever or chill  no cp or palp   no sob or cough   no N/V/D/ no abd pain   ext + edema         VITALS/PHYSICAL EXAM  --------------------------------------------------------------------------------  T(C): 36.4 (05-19-20 @ 18:22), Max: 37 (05-19-20 @ 09:12)  HR: 88 (05-19-20 @ 18:22) (60 - 88)  BP: 163/71 (05-19-20 @ 18:22) (135/74 - 168/74)  RR: 19 (05-19-20 @ 18:22) (18 - 20)  SpO2: 94% (05-19-20 @ 18:22) (94% - 99%)  Wt(kg): --        05-18-20 @ 07:01  -  05-19-20 @ 07:00  --------------------------------------------------------  IN: 400 mL / OUT: 0 mL / NET: 400 mL    05-19-20 @ 07:01  -  05-19-20 @ 19:18  --------------------------------------------------------  IN: 1280 mL / OUT: 3800 mL / NET: -2520 mL      Physical Exam:  		  Gen: Non toxic comfortable appearing   	no jvd  	Pulm: decrease bs  no rales or ronchi or wheezing  	CV: RRR, S1S2; no rub  	Abd: +BS, soft, nontender/nondistended  	: No suprapubic tenderness  	UE: Warm, no cyanosis  no clubbing,  no edema   	LE: Warm, no cyanosis  no clubbing, 2+  edema  	Neuro: alert and oriented.   		Skin: Warm, no decrease skin turgor   	Vascular access: + avf + bruit and thrill + dressing over avf   	  	    LABS/STUDIES  --------------------------------------------------------------------------------              9.2    4.89  >-----------<  201      [05-19-20 @ 06:07]              31.2     136  |  92  |  24  ----------------------------<  218      [05-19-20 @ 06:07]  5.1   |  29  |  6.21        Ca     8.7     [05-19-20 @ 06:07]      Mg     2.4     [05-19-20 @ 06:07]      Phos  6.3     [05-19-20 @ 06:07]                    imp/suggest: ESRD      Hemodialysis Prescription:  	Access:  	Dialyzer: revaclear   	Blood Flow (mL/Min): 400  	Dialysate Flow (mL/Min): 600  	Target UF (Liters):  	Treatment Time:  	Potassium:   	Calcium: 2.5  	  YOLANDA    Vitamin D     continue with hd   see hd flow sheet
Vascular Surgery      Patient seen and examined at bedside. Feeling well. No current complaints.       Vital Signs Last 24 Hrs  T(C): 36.7 (05-20-20 @ 04:15), Max: 37.1 (05-20-20 @ 00:45)  T(F): 98 (05-20-20 @ 04:15), Max: 98.8 (05-20-20 @ 00:45)  HR: 75 (05-20-20 @ 04:15) (60 - 88)  BP: 158/75 (05-20-20 @ 04:15) (141/89 - 163/74)  BP(mean): --  RR: 20 (05-20-20 @ 04:15) (18 - 20)  SpO2: 100% (05-20-20 @ 04:15) (92% - 100%)  I&O's Detail    19 May 2020 07:01  -  20 May 2020 07:00  --------------------------------------------------------  IN:    Oral Fluid: 1020 mL    Other: 800 mL  Total IN: 1820 mL    OUT:    Other: 3800 mL  Total OUT: 3800 mL    Total NET: -1980 mL    PE  Gen: NAD  Vascular: Left AV fistula with palpable thrill. Left radial pulse intact. Hand warm.                           9.2    4.89  )-----------( 201      ( 19 May 2020 06:07 )             31.2     05-19    136  |  92<L>  |  24<H>  ----------------------------<  218<H>  5.1   |  29  |  6.21<H>    Ca    8.7      19 May 2020 06:07  Phos  6.3     05-19  Mg     2.4     05-19        CAPILLARY BLOOD GLUCOSE      POCT Blood Glucose.: 234 mg/dL (19 May 2020 22:45)  POCT Blood Glucose.: 87 mg/dL (19 May 2020 19:47)  POCT Blood Glucose.: 195 mg/dL (19 May 2020 11:42)  POCT Blood Glucose.: 213 mg/dL (19 May 2020 08:08)      MEDICATIONS  (STANDING):  acetaminophen  IVPB .. 1000 milliGRAM(s) IV Intermittent once  aspirin enteric coated 81 milliGRAM(s) Oral daily  atorvastatin 20 milliGRAM(s) Oral at bedtime  clopidogrel Tablet 75 milliGRAM(s) Oral daily  heparin   Injectable 5000 Unit(s) SubCutaneous every 8 hours  insulin glargine Injectable (LANTUS) 5 Unit(s) SubCutaneous at bedtime  insulin lispro (HumaLOG) corrective regimen sliding scale   SubCutaneous at bedtime  insulin lispro (HumaLOG) corrective regimen sliding scale   SubCutaneous three times a day before meals  lisinopril 10 milliGRAM(s) Oral daily  metoprolol succinate ER 50 milliGRAM(s) Oral daily  sevelamer carbonate 2400 milliGRAM(s) Oral three times a day with meals    MEDICATIONS  (PRN):  buDESOnide    Inhalation Suspension 0.5 milliGRAM(s) Inhalation two times a day PRN wheezing  oxycodone    5 mG/acetaminophen 325 mG 1 Tablet(s) Oral every 4 hours PRN Moderate Pain (4 - 6)

## 2020-05-20 NOTE — PROGRESS NOTE ADULT - ASSESSMENT
64 f with    s/p AVF revision    - postop care      Pneumonia due to 2019 novel coronavirus.  - resolved.   - airborne + contact isolation    COPD  -MDI albuterol prn    CAD  -cardiology evaluation.    -c/w aspirin, plavix, statin, bb.     -f/u further cards recs    ESRD on hemodialysis.   -c/w sevelemer  -off midodrine  -renal follow Dr. Schmidt.     Type 2 diabetes mellitus with hyperglycemia, with long-term current use of insulin.  -Lantus 13/humalog 2 premeal    Advanced care planning/counseling discussion.  - pt was DNR/ DNI during previous admission.   - trial of IV fluids/abx acceptable.     Prophylactic measure.   - Heparin    Pierce Viera MD pager [phone]
64 f with    s/p AVF revision    - postop care      Pneumonia due to 2019 novel coronavirus.  - resolved.   - airborne + contact isolation    COPD  -MDI albuterol prn    CAD  -cardiology evaluation.    -c/w aspirin, plavix, statin, bb.     -f/u further cards recs    ESRD on hemodialysis.   -c/w sevelemer  -off midodrine  -renal follow Dr. Schmidt.     Type 2 diabetes mellitus with hyperglycemia, with long-term current use of insulin.  -Lantus 13/humalog 2 premeal    Advanced care planning/counseling discussion.  - pt was DNR/ DNI during previous admission.   - trial of IV fluids/abx acceptable.     Prophylactic measure.   - Heparin    Medically stable.    Pierce Viera MD pager [phone]
64 f with    s/p AVF revision    - postop care      Pneumonia due to 2019 novel coronavirus.  - resolved.   - airborne + contact isolation    COPD  -MDI albuterol prn    CAD  -cardiology evaluation.    -c/w aspirin, plavix, statin, bb.     -f/u further cards recs    ESRD on hemodialysis.   -c/w sevelemer  -off midodrine  -renal follow Dr. Schmidt.     Type 2 diabetes mellitus with hyperglycemia, with long-term current use of insulin.  -Lantus 13/humalog 2 premeal    Advanced care planning/counseling discussion.  - pt was DNR/ DNI during previous admission.   - trial of IV fluids/abx acceptable.     Prophylactic measure.   - Heparin    Medically stable.    Pierce Viera MD pager [phone]
64F with AVF ulceration, now POD 1 Left AVF revision.     Plan:  - HD today  - c/w Plavix and ASA  - pain management as needed  - Diabetic Diet  - possible D/C today after HD    Tasneem Forman PA-C  [id number]  Vascular Surgery
64F with AVF ulceration, now POD 2 Left AVF revision.     Plan:  -Discharge home today
64F with CAD Sp PTCA w/ stents, CHF, COPD, CVA, DMT1, ESRD on HD (M,Tu,Th,Sa), Gout, HLD, PVD, Subclavian Stenosis (L) s/p stent presents with ulceration over LUE AVF which was being monitored last admission and as outpt. In the ED, patient with normal vital signs and labs. LUE with AVF with palpable thrill. Small ~ 5mm area of ulceration, slightly worse than last time I saw it on 5/6/2020. underwent revision avf this am        1- ESRD  2- SHPT  3- HTN   4- covid 19       hd  F 160 3.5 hr 2.3 liter 2k bath bfr 400 dfr 600   renvela 3 tab with meal   cont lisinopril 10 mg daily   cont metoprolol
64F with CAD Sp PTCA w/ stents, CHF, COPD, CVA, DMT1, ESRD on HD (M,Tu,Th,Sa), Gout, HLD, PVD, Subclavian Stenosis (L) s/p stent presents with ulceration over LUE AVF which was being monitored last admission and as outpt. In the ED, patient with normal vital signs and labs. LUE with AVF with palpable thrill. Small ~ 5mm area of ulceration, slightly worse than last time I saw it on 5/6/2020. underwent revision avf this am        1- ESRD  2- SHPT  3- HTN   4- covid 19       renvela 3 tab with meals  hd in am   lytes in range  cont lisinopril 10 mg daily   cont metoprolol

## 2020-05-20 NOTE — PROGRESS NOTE ADULT - REASON FOR ADMISSION
AVF revision

## 2020-05-21 NOTE — ED PROVIDER NOTE - CRTICAL CARE TIME SPENT (MIN)
Department of Anesthesiology  Preprocedure Note       Name:  Marilee Perdue   Age:  34 y.o.  :  1986                                          MRN:  [id number]         Date:  2020      Surgeon: Hitesh Tai):  Angelique Dale MD    Procedure: Procedure(s):  COLONOSCOPY DIAGNOSTIC    Medications prior to admission:   Prior to Admission medications    Medication Sig Start Date End Date Taking? Authorizing Provider   ketorolac (TORADOL) 10 MG tablet Take 1 tablet by mouth every 6 hours as needed for Pain 18   Luis Alberto Partida MD   escitalopram (LEXAPRO) 10 MG tablet TK 1 T PO  QD 10/7/18   Historical Provider, MD   omeprazole (PRILOSEC) 20 MG delayed release capsule TK ONE C PO  QD 10/25/18   Historical Provider, MD   cetirizine (ZYRTEC) 10 MG tablet Take 10 mg by mouth daily    Historical Provider, MD       Current medications:    Current Facility-Administered Medications   Medication Dose Route Frequency Provider Last Rate Last Dose    lactated ringers infusion   Intravenous Continuous Angelique Dale MD           Allergies:     Allergies   Allergen Reactions    Ceclor [Cefaclor]        Problem List:    Patient Active Problem List   Diagnosis Code    RLQ abdominal pain R10.31    Abnormal CT of the abdomen R93.5       Past Medical History:        Diagnosis Date    Acid reflux     Anxiety     Depression     GERD (gastroesophageal reflux disease)        Past Surgical History:        Procedure Laterality Date    WISDOM TOOTH EXTRACTION         Social History:    Social History     Tobacco Use    Smoking status: Never Smoker    Smokeless tobacco: Never Used   Substance Use Topics    Alcohol use: Yes     Comment: occ                                Counseling given: Not Answered      Vital Signs (Current):   Vitals:    20 0829   BP: 125/69   Pulse: 74   Resp: 16   Temp: 97.7 °F (36.5 °C)   TempSrc: Temporal   SpO2: 100%   Weight: 290 lb (131.5 kg)   Height: 5' 9\" (1.753 m)
32

## 2020-05-22 ENCOUNTER — TRANSCRIPTION ENCOUNTER (OUTPATIENT)
Age: 65
End: 2020-05-22

## 2020-05-26 ENCOUNTER — TRANSCRIPTION ENCOUNTER (OUTPATIENT)
Age: 65
End: 2020-05-26

## 2020-05-28 PROCEDURE — 82435 ASSAY OF BLOOD CHLORIDE: CPT

## 2020-05-28 PROCEDURE — 82330 ASSAY OF CALCIUM: CPT

## 2020-05-28 PROCEDURE — 71045 X-RAY EXAM CHEST 1 VIEW: CPT

## 2020-05-28 PROCEDURE — 83605 ASSAY OF LACTIC ACID: CPT

## 2020-05-28 PROCEDURE — 85610 PROTHROMBIN TIME: CPT

## 2020-05-28 PROCEDURE — 84484 ASSAY OF TROPONIN QUANT: CPT

## 2020-05-28 PROCEDURE — 83615 LACTATE (LD) (LDH) ENZYME: CPT

## 2020-05-28 PROCEDURE — 85730 THROMBOPLASTIN TIME PARTIAL: CPT

## 2020-05-28 PROCEDURE — 80048 BASIC METABOLIC PNL TOTAL CA: CPT

## 2020-05-28 PROCEDURE — 85384 FIBRINOGEN ACTIVITY: CPT

## 2020-05-28 PROCEDURE — 82962 GLUCOSE BLOOD TEST: CPT

## 2020-05-28 PROCEDURE — 99285 EMERGENCY DEPT VISIT HI MDM: CPT | Mod: 25

## 2020-05-28 PROCEDURE — 96375 TX/PRO/DX INJ NEW DRUG ADDON: CPT

## 2020-05-28 PROCEDURE — 85027 COMPLETE CBC AUTOMATED: CPT

## 2020-05-28 PROCEDURE — 80053 COMPREHEN METABOLIC PANEL: CPT

## 2020-05-28 PROCEDURE — 85014 HEMATOCRIT: CPT

## 2020-05-28 PROCEDURE — 99284 EMERGENCY DEPT VISIT MOD MDM: CPT

## 2020-05-28 PROCEDURE — 93005 ELECTROCARDIOGRAM TRACING: CPT

## 2020-05-28 PROCEDURE — 96374 THER/PROPH/DIAG INJ IV PUSH: CPT

## 2020-05-28 PROCEDURE — 82947 ASSAY GLUCOSE BLOOD QUANT: CPT

## 2020-05-28 PROCEDURE — 99261: CPT

## 2020-05-28 PROCEDURE — 83735 ASSAY OF MAGNESIUM: CPT

## 2020-05-28 PROCEDURE — 82728 ASSAY OF FERRITIN: CPT

## 2020-05-28 PROCEDURE — 86140 C-REACTIVE PROTEIN: CPT

## 2020-05-28 PROCEDURE — 84100 ASSAY OF PHOSPHORUS: CPT

## 2020-05-28 PROCEDURE — 84132 ASSAY OF SERUM POTASSIUM: CPT

## 2020-05-28 PROCEDURE — 85379 FIBRIN DEGRADATION QUANT: CPT

## 2020-05-28 PROCEDURE — 83036 HEMOGLOBIN GLYCOSYLATED A1C: CPT

## 2020-05-28 PROCEDURE — 87635 SARS-COV-2 COVID-19 AMP PRB: CPT

## 2020-05-28 PROCEDURE — 84145 PROCALCITONIN (PCT): CPT

## 2020-05-28 PROCEDURE — 86706 HEP B SURFACE ANTIBODY: CPT

## 2020-05-28 PROCEDURE — 82803 BLOOD GASES ANY COMBINATION: CPT

## 2020-05-28 PROCEDURE — 84295 ASSAY OF SERUM SODIUM: CPT

## 2020-05-28 PROCEDURE — 82553 CREATINE MB FRACTION: CPT

## 2020-05-28 PROCEDURE — 82550 ASSAY OF CK (CPK): CPT

## 2020-05-28 PROCEDURE — 93971 EXTREMITY STUDY: CPT

## 2020-05-28 PROCEDURE — 87340 HEPATITIS B SURFACE AG IA: CPT

## 2020-05-29 ENCOUNTER — TRANSCRIPTION ENCOUNTER (OUTPATIENT)
Age: 65
End: 2020-05-29

## 2020-06-01 ENCOUNTER — TRANSCRIPTION ENCOUNTER (OUTPATIENT)
Age: 65
End: 2020-06-01

## 2020-06-01 ENCOUNTER — OUTPATIENT (OUTPATIENT)
Dept: OUTPATIENT SERVICES | Facility: HOSPITAL | Age: 65
LOS: 1 days | End: 2020-06-01

## 2020-06-01 DIAGNOSIS — Z98.89 OTHER SPECIFIED POSTPROCEDURAL STATES: Chronic | ICD-10-CM

## 2020-06-02 PROCEDURE — 82803 BLOOD GASES ANY COMBINATION: CPT

## 2020-06-02 PROCEDURE — P9016: CPT

## 2020-06-02 PROCEDURE — 82947 ASSAY GLUCOSE BLOOD QUANT: CPT

## 2020-06-02 PROCEDURE — 83605 ASSAY OF LACTIC ACID: CPT

## 2020-06-02 PROCEDURE — 82435 ASSAY OF BLOOD CHLORIDE: CPT

## 2020-06-02 PROCEDURE — 71045 X-RAY EXAM CHEST 1 VIEW: CPT

## 2020-06-02 PROCEDURE — 84295 ASSAY OF SERUM SODIUM: CPT

## 2020-06-02 PROCEDURE — 80048 BASIC METABOLIC PNL TOTAL CA: CPT

## 2020-06-02 PROCEDURE — 86850 RBC ANTIBODY SCREEN: CPT

## 2020-06-02 PROCEDURE — 85014 HEMATOCRIT: CPT

## 2020-06-02 PROCEDURE — 99261: CPT

## 2020-06-02 PROCEDURE — 93005 ELECTROCARDIOGRAM TRACING: CPT

## 2020-06-02 PROCEDURE — 80053 COMPREHEN METABOLIC PANEL: CPT

## 2020-06-02 PROCEDURE — 88304 TISSUE EXAM BY PATHOLOGIST: CPT

## 2020-06-02 PROCEDURE — 87340 HEPATITIS B SURFACE AG IA: CPT

## 2020-06-02 PROCEDURE — 83735 ASSAY OF MAGNESIUM: CPT

## 2020-06-02 PROCEDURE — 85027 COMPLETE CBC AUTOMATED: CPT

## 2020-06-02 PROCEDURE — 85730 THROMBOPLASTIN TIME PARTIAL: CPT

## 2020-06-02 PROCEDURE — 86923 COMPATIBILITY TEST ELECTRIC: CPT

## 2020-06-02 PROCEDURE — 85610 PROTHROMBIN TIME: CPT

## 2020-06-02 PROCEDURE — 82330 ASSAY OF CALCIUM: CPT

## 2020-06-02 PROCEDURE — 86706 HEP B SURFACE ANTIBODY: CPT

## 2020-06-02 PROCEDURE — C1889: CPT

## 2020-06-02 PROCEDURE — 87040 BLOOD CULTURE FOR BACTERIA: CPT

## 2020-06-02 PROCEDURE — 84132 ASSAY OF SERUM POTASSIUM: CPT

## 2020-06-02 PROCEDURE — 82962 GLUCOSE BLOOD TEST: CPT

## 2020-06-02 PROCEDURE — 86803 HEPATITIS C AB TEST: CPT

## 2020-06-02 PROCEDURE — 84100 ASSAY OF PHOSPHORUS: CPT

## 2020-06-02 PROCEDURE — 86901 BLOOD TYPING SEROLOGIC RH(D): CPT

## 2020-06-02 PROCEDURE — 86900 BLOOD TYPING SEROLOGIC ABO: CPT

## 2020-06-02 PROCEDURE — 86704 HEP B CORE ANTIBODY TOTAL: CPT

## 2020-06-02 PROCEDURE — 99285 EMERGENCY DEPT VISIT HI MDM: CPT

## 2020-06-06 ENCOUNTER — INPATIENT (INPATIENT)
Facility: HOSPITAL | Age: 65
LOS: 1 days | Discharge: HOME | DRG: 291 | End: 2020-06-08
Attending: INTERNAL MEDICINE | Admitting: INTERNAL MEDICINE
Payer: MEDICARE

## 2020-06-06 VITALS
HEIGHT: 62 IN | HEART RATE: 70 BPM | RESPIRATION RATE: 18 BRPM | WEIGHT: 139.99 LBS | TEMPERATURE: 96 F | OXYGEN SATURATION: 99 % | DIASTOLIC BLOOD PRESSURE: 80 MMHG | SYSTOLIC BLOOD PRESSURE: 154 MMHG

## 2020-06-06 DIAGNOSIS — Z98.89 OTHER SPECIFIED POSTPROCEDURAL STATES: Chronic | ICD-10-CM

## 2020-06-06 DIAGNOSIS — R06.02 SHORTNESS OF BREATH: ICD-10-CM

## 2020-06-06 LAB
ALBUMIN SERPL ELPH-MCNC: 4.4 G/DL — SIGNIFICANT CHANGE UP (ref 3.3–5)
ALP SERPL-CCNC: 220 U/L — HIGH (ref 40–120)
ALT FLD-CCNC: 7 U/L — LOW (ref 10–45)
ANION GAP SERPL CALC-SCNC: 19 MMOL/L — HIGH (ref 5–17)
APTT BLD: 34.9 SEC — SIGNIFICANT CHANGE UP (ref 27.5–36.3)
AST SERPL-CCNC: 16 U/L — SIGNIFICANT CHANGE UP (ref 10–40)
B-OH-BUTYR SERPL-SCNC: 0.1 MMOL/L — SIGNIFICANT CHANGE UP
BASE EXCESS BLDV CALC-SCNC: 1.4 MMOL/L — SIGNIFICANT CHANGE UP (ref -2–2)
BASOPHILS # BLD AUTO: 0.05 K/UL — SIGNIFICANT CHANGE UP (ref 0–0.2)
BASOPHILS NFR BLD AUTO: 0.9 % — SIGNIFICANT CHANGE UP (ref 0–2)
BILIRUB SERPL-MCNC: 0.3 MG/DL — SIGNIFICANT CHANGE UP (ref 0.2–1.2)
BUN SERPL-MCNC: 29 MG/DL — HIGH (ref 7–23)
CA-I SERPL-SCNC: 1.17 MMOL/L — SIGNIFICANT CHANGE UP (ref 1.12–1.3)
CALCIUM SERPL-MCNC: 9.7 MG/DL — SIGNIFICANT CHANGE UP (ref 8.4–10.5)
CHLORIDE BLDV-SCNC: 97 MMOL/L — SIGNIFICANT CHANGE UP (ref 96–108)
CHLORIDE SERPL-SCNC: 94 MMOL/L — LOW (ref 96–108)
CO2 BLDV-SCNC: 30 MMOL/L — SIGNIFICANT CHANGE UP (ref 22–30)
CO2 SERPL-SCNC: 24 MMOL/L — SIGNIFICANT CHANGE UP (ref 22–31)
CREAT SERPL-MCNC: 5.29 MG/DL — HIGH (ref 0.5–1.3)
CRP SERPL-MCNC: 0.97 MG/DL — HIGH (ref 0–0.4)
EOSINOPHIL # BLD AUTO: 0.16 K/UL — SIGNIFICANT CHANGE UP (ref 0–0.5)
EOSINOPHIL NFR BLD AUTO: 2.6 % — SIGNIFICANT CHANGE UP (ref 0–6)
ERYTHROCYTE [SEDIMENTATION RATE] IN BLOOD: 34 MM/HR — HIGH (ref 0–20)
GAS PNL BLDV: 134 MMOL/L — LOW (ref 135–145)
GAS PNL BLDV: SIGNIFICANT CHANGE UP
GAS PNL BLDV: SIGNIFICANT CHANGE UP
GIANT PLATELETS BLD QL SMEAR: PRESENT — SIGNIFICANT CHANGE UP
GLUCOSE BLDC GLUCOMTR-MCNC: 197 MG/DL — HIGH (ref 70–99)
GLUCOSE BLDC GLUCOMTR-MCNC: 204 MG/DL — HIGH (ref 70–99)
GLUCOSE BLDV-MCNC: 269 MG/DL — HIGH (ref 70–99)
GLUCOSE SERPL-MCNC: 282 MG/DL — HIGH (ref 70–99)
HCO3 BLDV-SCNC: 28 MMOL/L — SIGNIFICANT CHANGE UP (ref 21–29)
HCT VFR BLD CALC: 33.8 % — LOW (ref 34.5–45)
HCT VFR BLDA CALC: 32 % — LOW (ref 39–50)
HGB BLD CALC-MCNC: 10.3 G/DL — LOW (ref 11.5–15.5)
HGB BLD-MCNC: 10.1 G/DL — LOW (ref 11.5–15.5)
INR BLD: 1.08 RATIO — SIGNIFICANT CHANGE UP (ref 0.88–1.16)
LACTATE BLDV-MCNC: 2.6 MMOL/L — HIGH (ref 0.7–2)
LYMPHOCYTES # BLD AUTO: 0.83 K/UL — LOW (ref 1–3.3)
LYMPHOCYTES # BLD AUTO: 13.9 % — SIGNIFICANT CHANGE UP (ref 13–44)
MAGNESIUM SERPL-MCNC: 2.6 MG/DL — SIGNIFICANT CHANGE UP (ref 1.6–2.6)
MANUAL SMEAR VERIFICATION: SIGNIFICANT CHANGE UP
MCHC RBC-ENTMCNC: 29.9 GM/DL — LOW (ref 32–36)
MCHC RBC-ENTMCNC: 34.2 PG — HIGH (ref 27–34)
MCV RBC AUTO: 114.6 FL — HIGH (ref 80–100)
MONOCYTES # BLD AUTO: 0.57 K/UL — SIGNIFICANT CHANGE UP (ref 0–0.9)
MONOCYTES NFR BLD AUTO: 9.6 % — SIGNIFICANT CHANGE UP (ref 2–14)
NEUTROPHILS # BLD AUTO: 4.36 K/UL — SIGNIFICANT CHANGE UP (ref 1.8–7.4)
NEUTROPHILS NFR BLD AUTO: 73 % — SIGNIFICANT CHANGE UP (ref 43–77)
PCO2 BLDV: 59 MMHG — HIGH (ref 35–50)
PH BLDV: 7.3 — LOW (ref 7.35–7.45)
PLAT MORPH BLD: ABNORMAL
PLATELET # BLD AUTO: 194 K/UL — SIGNIFICANT CHANGE UP (ref 150–400)
PO2 BLDV: 26 MMHG — SIGNIFICANT CHANGE UP (ref 25–45)
POTASSIUM BLDV-SCNC: 4.8 MMOL/L — SIGNIFICANT CHANGE UP (ref 3.5–5.3)
POTASSIUM SERPL-MCNC: 5.1 MMOL/L — SIGNIFICANT CHANGE UP (ref 3.5–5.3)
POTASSIUM SERPL-SCNC: 5.1 MMOL/L — SIGNIFICANT CHANGE UP (ref 3.5–5.3)
PROCALCITONIN SERPL-MCNC: 0.49 NG/ML — HIGH (ref 0.02–0.1)
PROT SERPL-MCNC: 6.9 G/DL — SIGNIFICANT CHANGE UP (ref 6–8.3)
PROTHROM AB SERPL-ACNC: 12.5 SEC — SIGNIFICANT CHANGE UP (ref 10–12.9)
RBC # BLD: 2.95 M/UL — LOW (ref 3.8–5.2)
RBC # FLD: 15.9 % — HIGH (ref 10.3–14.5)
RBC BLD AUTO: SIGNIFICANT CHANGE UP
SAO2 % BLDV: 31 % — LOW (ref 67–88)
SARS-COV-2 RNA SPEC QL NAA+PROBE: DETECTED
SARS-COV-2 RNA SPEC QL NAA+PROBE: SIGNIFICANT CHANGE UP
SODIUM SERPL-SCNC: 137 MMOL/L — SIGNIFICANT CHANGE UP (ref 135–145)
WBC # BLD: 5.97 K/UL — SIGNIFICANT CHANGE UP (ref 3.8–10.5)
WBC # FLD AUTO: 5.97 K/UL — SIGNIFICANT CHANGE UP (ref 3.8–10.5)

## 2020-06-06 PROCEDURE — 99285 EMERGENCY DEPT VISIT HI MDM: CPT | Mod: CS

## 2020-06-06 PROCEDURE — 93308 TTE F-UP OR LMTD: CPT | Mod: 26

## 2020-06-06 PROCEDURE — 71045 X-RAY EXAM CHEST 1 VIEW: CPT | Mod: 26

## 2020-06-06 PROCEDURE — 76937 US GUIDE VASCULAR ACCESS: CPT | Mod: 26

## 2020-06-06 RX ORDER — ASPIRIN/CALCIUM CARB/MAGNESIUM 324 MG
81 TABLET ORAL DAILY
Refills: 0 | Status: DISCONTINUED | OUTPATIENT
Start: 2020-06-06 | End: 2020-06-08

## 2020-06-06 RX ORDER — INSULIN GLARGINE 100 [IU]/ML
10 INJECTION, SOLUTION SUBCUTANEOUS AT BEDTIME
Refills: 0 | Status: DISCONTINUED | OUTPATIENT
Start: 2020-06-06 | End: 2020-06-08

## 2020-06-06 RX ORDER — INSULIN LISPRO 100/ML
2 VIAL (ML) SUBCUTANEOUS
Refills: 0 | Status: DISCONTINUED | OUTPATIENT
Start: 2020-06-06 | End: 2020-06-08

## 2020-06-06 RX ORDER — METOPROLOL TARTRATE 50 MG
50 TABLET ORAL DAILY
Refills: 0 | Status: DISCONTINUED | OUTPATIENT
Start: 2020-06-06 | End: 2020-06-08

## 2020-06-06 RX ORDER — SODIUM CHLORIDE 9 MG/ML
1000 INJECTION, SOLUTION INTRAVENOUS
Refills: 0 | Status: DISCONTINUED | OUTPATIENT
Start: 2020-06-06 | End: 2020-06-08

## 2020-06-06 RX ORDER — OXYCODONE AND ACETAMINOPHEN 5; 325 MG/1; MG/1
1 TABLET ORAL EVERY 6 HOURS
Refills: 0 | Status: DISCONTINUED | OUTPATIENT
Start: 2020-06-06 | End: 2020-06-08

## 2020-06-06 RX ORDER — HEPARIN SODIUM 5000 [USP'U]/ML
5000 INJECTION INTRAVENOUS; SUBCUTANEOUS
Refills: 0 | Status: DISCONTINUED | OUTPATIENT
Start: 2020-06-06 | End: 2020-06-08

## 2020-06-06 RX ORDER — ATORVASTATIN CALCIUM 80 MG/1
20 TABLET, FILM COATED ORAL AT BEDTIME
Refills: 0 | Status: DISCONTINUED | OUTPATIENT
Start: 2020-06-06 | End: 2020-06-08

## 2020-06-06 RX ORDER — GLUCAGON INJECTION, SOLUTION 0.5 MG/.1ML
1 INJECTION, SOLUTION SUBCUTANEOUS ONCE
Refills: 0 | Status: DISCONTINUED | OUTPATIENT
Start: 2020-06-06 | End: 2020-06-08

## 2020-06-06 RX ORDER — DEXTROSE 50 % IN WATER 50 %
15 SYRINGE (ML) INTRAVENOUS ONCE
Refills: 0 | Status: DISCONTINUED | OUTPATIENT
Start: 2020-06-06 | End: 2020-06-08

## 2020-06-06 RX ORDER — ACETAMINOPHEN 500 MG
650 TABLET ORAL EVERY 6 HOURS
Refills: 0 | Status: DISCONTINUED | OUTPATIENT
Start: 2020-06-06 | End: 2020-06-08

## 2020-06-06 RX ORDER — LISINOPRIL 2.5 MG/1
10 TABLET ORAL DAILY
Refills: 0 | Status: DISCONTINUED | OUTPATIENT
Start: 2020-06-06 | End: 2020-06-08

## 2020-06-06 RX ORDER — CLOPIDOGREL BISULFATE 75 MG/1
75 TABLET, FILM COATED ORAL DAILY
Refills: 0 | Status: DISCONTINUED | OUTPATIENT
Start: 2020-06-06 | End: 2020-06-08

## 2020-06-06 RX ORDER — DOXERCALCIFEROL 2.5 UG/1
4 CAPSULE ORAL ONCE
Refills: 0 | Status: COMPLETED | OUTPATIENT
Start: 2020-06-06 | End: 2020-06-08

## 2020-06-06 RX ORDER — DEXTROSE 50 % IN WATER 50 %
25 SYRINGE (ML) INTRAVENOUS ONCE
Refills: 0 | Status: DISCONTINUED | OUTPATIENT
Start: 2020-06-06 | End: 2020-06-08

## 2020-06-06 RX ORDER — HEPARIN SODIUM 5000 [USP'U]/ML
5000 INJECTION INTRAVENOUS; SUBCUTANEOUS
Refills: 0 | Status: DISCONTINUED | OUTPATIENT
Start: 2020-06-06 | End: 2020-06-06

## 2020-06-06 RX ORDER — SEVELAMER CARBONATE 2400 MG/1
2400 POWDER, FOR SUSPENSION ORAL
Refills: 0 | Status: DISCONTINUED | OUTPATIENT
Start: 2020-06-06 | End: 2020-06-08

## 2020-06-06 RX ORDER — INSULIN LISPRO 100/ML
VIAL (ML) SUBCUTANEOUS
Refills: 0 | Status: DISCONTINUED | OUTPATIENT
Start: 2020-06-06 | End: 2020-06-08

## 2020-06-06 RX ORDER — DEXTROSE 50 % IN WATER 50 %
12.5 SYRINGE (ML) INTRAVENOUS ONCE
Refills: 0 | Status: DISCONTINUED | OUTPATIENT
Start: 2020-06-06 | End: 2020-06-08

## 2020-06-06 RX ORDER — BUDESONIDE, MICRONIZED 100 %
0.5 POWDER (GRAM) MISCELLANEOUS
Refills: 0 | Status: DISCONTINUED | OUTPATIENT
Start: 2020-06-06 | End: 2020-06-08

## 2020-06-06 RX ORDER — INSULIN LISPRO 100/ML
VIAL (ML) SUBCUTANEOUS AT BEDTIME
Refills: 0 | Status: DISCONTINUED | OUTPATIENT
Start: 2020-06-06 | End: 2020-06-08

## 2020-06-06 RX ADMIN — Medication 0.5 MILLIGRAM(S): at 21:32

## 2020-06-06 RX ADMIN — CLOPIDOGREL BISULFATE 75 MILLIGRAM(S): 75 TABLET, FILM COATED ORAL at 17:25

## 2020-06-06 RX ADMIN — LISINOPRIL 10 MILLIGRAM(S): 2.5 TABLET ORAL at 17:26

## 2020-06-06 RX ADMIN — INSULIN GLARGINE 10 UNIT(S): 100 INJECTION, SOLUTION SUBCUTANEOUS at 21:10

## 2020-06-06 RX ADMIN — HEPARIN SODIUM 5000 UNIT(S): 5000 INJECTION INTRAVENOUS; SUBCUTANEOUS at 17:25

## 2020-06-06 RX ADMIN — Medication 2 UNIT(S): at 17:25

## 2020-06-06 RX ADMIN — Medication 81 MILLIGRAM(S): at 17:25

## 2020-06-06 RX ADMIN — Medication 50 MILLIGRAM(S): at 17:26

## 2020-06-06 RX ADMIN — SEVELAMER CARBONATE 2400 MILLIGRAM(S): 2400 POWDER, FOR SUSPENSION ORAL at 17:26

## 2020-06-06 RX ADMIN — Medication 1: at 17:25

## 2020-06-06 RX ADMIN — ATORVASTATIN CALCIUM 20 MILLIGRAM(S): 80 TABLET, FILM COATED ORAL at 21:10

## 2020-06-06 NOTE — ED PROVIDER NOTE - OBJECTIVE STATEMENT
Attending Jana Rincon: 65 y/o female with multiple medical issues including ESRD on hd, HTn, DM, recently COVID positive in may presenting with sob and leg pain. pt states felt sob today. reports a nonproductive cough as well. no fevers or chills. was due for dialysis today but did not go. no abdominal pain. did not take any tylenol or motrin today. was taken off lasix reporteldy.

## 2020-06-06 NOTE — CONSULT NOTE ADULT - SUBJECTIVE AND OBJECTIVE BOX
[facility]  [phone]  NEPHROLOGY      INITIAL CONSULT NOTE  --------------------------------------------------------------------------------  HPI:      65 y/o female with multiple medical issues including ESRD on hd, HTn, DM, recently COVID positive in may presenting with sob and leg pain. pt states felt sob today. reports a nonproductive cough as well. no fevers or chills. was due for dialysis today but did not go. no abdominal pain. in er noticed with pulm edema/sob. renal consult called. seen earlier.       PAST HISTORY  --------------------------------------------------------------------------------  PAST MEDICAL & SURGICAL HISTORY:  COPD (chronic obstructive pulmonary disease)  Localized enlarged lymph nodes  CHF (congestive heart failure): EF 40-45%  Subclavian vein stenosis, left: s/p stent  DKA, type 1: 1/2015  ACS (acute coronary syndrome): 1/2015 - cath revealed 100% ostial stenosis not amenable to PCI - medical management  TIA (transient ischemic attack): x 2 - 8-9 years ago prior to ASD/VSD repair  CAD (coronary artery disease): s/p stents  Gout: past  CVA (cerebral infarction): with no residual, 8 yrs ago, prior to heart surgery - ST memory loss  Peripheral vascular disease: occluded left fem-pop bypass 5/2015  Diabetes mellitus type 1: Insulin Dependent -  ESRD (end stage renal disease): dialysis  M, tue, thursday, saturday  Hyperlipidemia  Status post device closure of ASD: &quot;clamshell&quot;  History of cardiac catheterization: 1/2015 - no intervention  S/P femoral-popliteal bypass surgery: L and R in 2013 with graft; 5/2015 CFA angioplasty left and ileofemoral endarterectomywith vein patch angioplasty of left fem-pop bypass graft  Multiple vascular surgery both leg, left fempop bypass revision 11/2015  AV (arteriovenous fistula): Left AV graft; revision with stent placement 2-3 years ago  S/P cholecystectomy    FAMILY HISTORY:  Family history of smoking  Family history of hypertension  Family history of cancer (Sibling)    PAST SOCIAL HISTORY: past tobacco use     ALLERGIES & MEDICATIONS  --------------------------------------------------------------------------------  Allergies    No Known Allergies    Intolerances      Standing Inpatient Medications  aspirin enteric coated 81 milliGRAM(s) Oral daily  atorvastatin 20 milliGRAM(s) Oral at bedtime  buDESOnide    Inhalation Suspension 0.5 milliGRAM(s) Inhalation two times a day  clopidogrel Tablet 75 milliGRAM(s) Oral daily  dextrose 5%. 1000 milliLiter(s) IV Continuous <Continuous>  dextrose 50% Injectable 12.5 Gram(s) IV Push once  dextrose 50% Injectable 25 Gram(s) IV Push once  dextrose 50% Injectable 25 Gram(s) IV Push once  doxercalciferol Injectable 4 MICROGram(s) IV Push once  heparin   Injectable 5000 Unit(s) SubCutaneous two times a day  insulin glargine Injectable (LANTUS) 10 Unit(s) SubCutaneous at bedtime  insulin lispro (HumaLOG) corrective regimen sliding scale   SubCutaneous three times a day before meals  insulin lispro (HumaLOG) corrective regimen sliding scale   SubCutaneous at bedtime  insulin lispro Injectable (HumaLOG) 2 Unit(s) SubCutaneous before breakfast  insulin lispro Injectable (HumaLOG) 2 Unit(s) SubCutaneous before lunch  insulin lispro Injectable (HumaLOG) 2 Unit(s) SubCutaneous before dinner  lisinopril 10 milliGRAM(s) Oral daily  metoprolol succinate ER 50 milliGRAM(s) Oral daily  sevelamer carbonate 2400 milliGRAM(s) Oral three times a day with meals    PRN Inpatient Medications  acetaminophen   Tablet .. 650 milliGRAM(s) Oral every 6 hours PRN  dextrose 40% Gel 15 Gram(s) Oral once PRN  glucagon  Injectable 1 milliGRAM(s) IntraMuscular once PRN  oxycodone    5 mG/acetaminophen 325 mG 1 Tablet(s) Oral every 6 hours PRN      REVIEW OF SYSTEMS  --------------------------------------------------------------------------------  Gen: No  fevers/chills   Skin: No rashes  Head/Eyes/Ears/Mouth: No headache; Normal hearing;  No sinus pain/discomfort, sore throat  Respiratory: + dyspnea, -  cough, - wheezing, hemoptysis -   CV: No chest pain, orthopnea or palp   GI: No abdominal pain, diarrhea, nausea, vomiting, melena  : No dysuria, hematuria,  MSK: No joint pain/swelling; no back pain  Neuro: No dizziness/lightheadedness,   + edema 2+   All other systems were reviewed and are negative, except as noted.    VITALS/PHYSICAL EXAM  --------------------------------------------------------------------------------  T(C): 36.3 (06-06-20 @ 15:56), Max: 36.6 (06-06-20 @ 09:30)  HR: 72 (06-06-20 @ 15:56) (70 - 82)  BP: 150/77 (06-06-20 @ 15:56) (150/77 - 161/84)  RR: 18 (06-06-20 @ 15:56) (18 - 18)  SpO2: 100% (06-06-20 @ 15:56) (99% - 100%)  Wt(kg): --  Height (cm): 157.48 (06-06-20 @ 09:05)  Weight (kg): 63.5 (06-06-20 @ 09:05)  BMI (kg/m2): 25.6 (06-06-20 @ 09:05)  BSA (m2): 1.64 (06-06-20 @ 09:05)      Physical Exam:  	Gen: Non toxic appears sob   	+ JVD  	Pulm: decrease bs  +  rales  -  ronchi -  wheezing  	CV: RRR, S1S2; no rub  	Back: No CVA tenderness   	Abd: +BS, soft, nontender/nondistended  	: No suprapubic tenderness  	UE: Warm, no cyanosis  no clubbing   	LE: Warm, no cyanosis  no clubbing, 4+ edema LLE > RLE   	Neuro: alert and oriented. speech coherent   	Skin: Warm, no decrease skin turgor       LABS/STUDIES  --------------------------------------------------------------------------------              10.1   5.97  >-----------<  194      [06-06-20 @ 10:02]              33.8     137  |  94  |  29  ----------------------------<  282      [06-06-20 @ 10:02]  5.1   |  24  |  5.29        Ca     9.7     [06-06-20 @ 10:02]      Mg     2.6     [06-06-20 @ 10:02]    TPro  6.9  /  Alb  4.4  /  TBili  0.3  /  DBili  x   /  AST  16  /  ALT  7   /  AlkPhos  220  [06-06-20 @ 10:02]    PT/INR: PT 12.5 , INR 1.08       [06-06-20 @ 10:02]  PTT: 34.9       [06-06-20 @ 10:02]          [06-06-20 @ 10:02]    Creatinine Trend:  SCr 5.29 [06-06 @ 10:02]  SCr 6.21 [05-19 @ 06:07]  SCr 4.78 [05-18 @ 07:13]  SCr 4.12 [05-17 @ 13:41]    Urinalysis - [06-04-17 @ 08:24]      Color Yellow / Appearance Clear / SG 1.013 / pH 8.5      Gluc 1000 / Ketone Negative  / Bili Negative / Urobili Negative       Blood Negative / Protein >600 / Leuk Est Small / Nitrite Negative      RBC 3-5 / WBC 6-10 / Hyaline  / Gran  / Sq Epi  / Non Sq Epi OCC / Bacteria       Iron 67, TIBC 234, %sat 29      [12-04-19 @ 08:38]  Ferritin 1387      [05-06-20 @ 12:17]  PTH -- (Ca 8.5)      [05-04-20 @ 04:39]   1317  PTH -- (Ca 8.3)      [12-04-19 @ 08:38]   952  PTH -- (Ca 9.6)      [06-17-19 @ 18:06]   177  HbA1c 7.7      [04-08-20 @ 08:03]  TSH 1.78      [11-14-19 @ 09:15]  Lipid: chol 108, TG 76, HDL 57, LDL 36      [11-14-19 @ 09:16]    HBsAb <3.0      [05-19-20 @ 19:32]  HBsAb Nonreact      [05-19-20 @ 19:32]  HBsAg Nonreact      [05-19-20 @ 19:32]  HBcAb Nonreact      [05-19-20 @ 19:32]  HCV 0.15, Nonreact      [05-19-20 @ 19:32]        < from: Xray Chest 1 View AP/PA (06.06.20 @ 09:53) >  EXAM:  XR CHEST AP OR PA 1V                            PROCEDURE DATE:  06/06/2020            INTERPRETATION:  TIME OF EXAM: June 6, 2020 at 9:48 AM    CLINICAL INFORMATION: Dyspnea    TECHNIQUE:   Portable chest    INTERPRETATION:     There are nofocal consolidations. Interstitial edema with Kerley B lines at the lung bases. Heart size is stable.      COMPARISON:  May 17, 2020      IMPRESSION:  Mild interstitial edema                    QUEENIE SUTTON M.D., ATTENDING RADIOLOGIST  This document has been electronically signed. Jun 6 2020 11:44AM    < end of copied text >

## 2020-06-06 NOTE — H&P ADULT - ASSESSMENT
64 f with    ESRD  - missed HD session  - Nephrology evaluation Dr. Schmidt    CAD (coronary artery disease) s/p stents  - continue Rx    CHF (congestive heart failure) EF 40-45%  - continue Rx  - cardiology evaluation Dr. Biswas    COPD (chronic obstructive pulmonary disease)   - nebs    CVA (cerebral infarction) with no residual, 8 yrs ago, prior to heart surgery - ST memory loss  - supportive care     Diabetes mellitus type 1 Insulin Dependent -  - BS control  - ADA diet    Gout   - stable    Hyperlipidemia   - continue Rx    Peripheral vascular disease occluded left fem-pop bypass 5/2015  - ruy    Further action as per clinical course     Pierce Viera MD pager [phone]

## 2020-06-06 NOTE — ED ADULT NURSE NOTE - OBJECTIVE STATEMENT
64 y female history of ESRD on dialysis through RUE fistula, CHF, DM, CAD presents via EMS with SOB, nonproductive cough and worsening left leg pain. Pt. COVID positive 7 weeks ago. Reports to not receiving dialysis today due to symptoms. EMS reports to patients BS to be 450 in field. Denies fevers, N/V/D, lightheadedness, dizziness. Patient reports to taking Percocet for leg pain but does not have any left. Reports to relief with legs off bed. A&Ox3. Skin warm dry and intact. LLE red swollen and painful below knee. Breathing tachypneic- placed on 2 L for SOB; saturating 100%. Abdomen soft rounded, nontender. Safety maintained- bed locked in lowest position. 20 G placed by Ultrasound by MD Rincon; patient tolerated well.

## 2020-06-06 NOTE — ED PROVIDER NOTE - CLINICAL SUMMARY MEDICAL DECISION MAKING FREE TEXT BOX
Attending Jana Rincon: 65 y/o female with multiple medical issues including ESRD on HD, COPD, cva in the past, CHF presenting with sob and leg pain. upon arrival pt awake and alert following commands. hemodynamically stable. on exam pt with decreased bs at bases. afebrile rectally. Attending Jana Rincon: 63 y/o female with multiple medical issues including ESRD on HD, COPD, cva in the past, CHF presenting with sob and leg pain. upon arrival pt awake and alert following commands. hemodynamically stable. on exam pt with decreased bs at bases. afebrile rectally. pocus shows bilateral B lines and RV dilation. pt with LE edema to left. had duplex on last arrival which was negative. pt needs dialysis. if fails to improve consider work up for PE. afebrile and no productive cough. cxr without evidence of pna.

## 2020-06-06 NOTE — H&P ADULT - NSHPPHYSICALEXAM_GEN_ALL_CORE
PHYSICAL EXAMINATION:  Vital Signs Last 24 Hrs  T(C): 36.6 (06 Jun 2020 09:30), Max: 36.6 (06 Jun 2020 09:30)  T(F): 97.9 (06 Jun 2020 09:30), Max: 97.9 (06 Jun 2020 09:30)  HR: 82 (06 Jun 2020 09:30) (70 - 82)  BP: 161/84 (06 Jun 2020 09:30) (154/80 - 161/84)  BP(mean): --  RR: 18 (06 Jun 2020 09:30) (18 - 18)  SpO2: 100% (06 Jun 2020 09:30) (99% - 100%)  CAPILLARY BLOOD GLUCOSE      POCT Blood Glucose.: 319 mg/dL (06 Jun 2020 09:14)      GENERAL: NAD, well-groomed, well-developed  HEAD:  atraumatic, normocephalic  EYES: sclera anicteric  ENMT: mucous membranes moist  NECK: supple, No JVD  CHEST/LUNG: clear to auscultation bilaterally; no rales, rhonchi, or wheezing b/l  HEART: normal S1, S2  ABDOMEN: BS+, soft, ND, NT   EXTREMITIES:  1-2+ bipedal edema L>R   NEURO: awake, alert, interactive; moves all extremities  SKIN: no rashes or lesions

## 2020-06-06 NOTE — H&P ADULT - NSHPLABSRESULTS_GEN_ALL_CORE
10.1   5.97  )-----------( 194      ( 06 Jun 2020 10:02 )             33.8       06-06    137  |  94<L>  |  29<H>  ----------------------------<  282<H>  5.1   |  24  |  5.29<H>    Ca    9.7      06 Jun 2020 10:02  Mg     2.6     06-06    TPro  6.9  /  Alb  4.4  /  TBili  0.3  /  DBili  x   /  AST  16  /  ALT  7<L>  /  AlkPhos  220<H>  06-06                  PT/INR - ( 06 Jun 2020 10:02 )   PT: 12.5 sec;   INR: 1.08 ratio         PTT - ( 06 Jun 2020 10:02 )  PTT:34.9 sec    Lactate Trend      CARDIAC MARKERS ( 06 Jun 2020 10:02 )  x     / x     / 313 U/L / x     / x            CAPILLARY BLOOD GLUCOSE      POCT Blood Glucose.: 319 mg/dL (06 Jun 2020 09:14)        Culture Results:   No Growth Final (05-17 @ 16:43)  Culture Results:   No Growth Final (05-17 @ 16:43)

## 2020-06-06 NOTE — H&P ADULT - HISTORY OF PRESENT ILLNESS
65 y/o female with multiple medical issues including ESRD on hd, HTn, DM, recently COVID positive in may presenting with sob and leg pain. pt states felt sob today. reports a nonproductive cough as well. no fevers or chills. was due for dialysis today but did not go. no abdominal pain. did not take any tylenol or motrin today. was taken off lasix reporteldy.

## 2020-06-06 NOTE — ED PROVIDER NOTE - PHYSICAL EXAMINATION
Attending Jana Rincon: Gen: NAD, heent: atrauamtic, eomi, perrla, mmm, op pink, uvula midline, neck; nttp, no nuchal rigidity, chest: nttp, no crepitus, cv: rrr, +murmur, lungs: ctab, abd: soft, nontender, nondistended, no peritoneal signs, +BS, no guarding, ext: wwp, neg homans, left leg lower extremity, wwp, neuro: awake and alert, following commands, speech clear, sensation and strength intact, no focal deficits

## 2020-06-06 NOTE — CONSULT NOTE ADULT - ASSESSMENT
65 y/o female with multiple medical issues including ESRD on hd, HTn, DM, recently COVID positive in may presenting with sob and leg pain. pt states felt sob today. reports a nonproductive cough as well. no fevers or chills. was due for dialysis today but did not go. no abdominal pain. in er noticed with pulm edema/sob.      1- ESRD  2- Pulm edema  3- Covid 19 +  4- DM   5- SHPT  6- CAD      she is quite fluid overloaded. requires HD. HD consent obtained witnessed and placed in chart  cont with O2   hd arrangements for fluid removal   covid 19 isolation   insulin to cont   renvela one tab with meals

## 2020-06-06 NOTE — ED PROCEDURE NOTE - PROCEDURE ADDITIONAL DETAILS
POCUS: Emergency Department Focused Ultrasound performed at patient's bedside.  The complete report will be available in PACS.
20 g IV to rightbrachial vein

## 2020-06-07 LAB
ANION GAP SERPL CALC-SCNC: 18 MMOL/L — HIGH (ref 5–17)
BUN SERPL-MCNC: 20 MG/DL — SIGNIFICANT CHANGE UP (ref 7–23)
CALCIUM SERPL-MCNC: 9.6 MG/DL — SIGNIFICANT CHANGE UP (ref 8.4–10.5)
CHLORIDE SERPL-SCNC: 91 MMOL/L — LOW (ref 96–108)
CO2 SERPL-SCNC: 25 MMOL/L — SIGNIFICANT CHANGE UP (ref 22–31)
CREAT SERPL-MCNC: 4.21 MG/DL — HIGH (ref 0.5–1.3)
GLUCOSE BLDC GLUCOMTR-MCNC: 177 MG/DL — HIGH (ref 70–99)
GLUCOSE BLDC GLUCOMTR-MCNC: 273 MG/DL — HIGH (ref 70–99)
GLUCOSE BLDC GLUCOMTR-MCNC: 286 MG/DL — HIGH (ref 70–99)
GLUCOSE SERPL-MCNC: 213 MG/DL — HIGH (ref 70–99)
HBV CORE AB SER-ACNC: SIGNIFICANT CHANGE UP
HBV SURFACE AB SER-ACNC: <3 MIU/ML — LOW
HBV SURFACE AB SER-ACNC: SIGNIFICANT CHANGE UP
HBV SURFACE AG SER-ACNC: SIGNIFICANT CHANGE UP
HCT VFR BLD CALC: 35.8 % — SIGNIFICANT CHANGE UP (ref 34.5–45)
HCV AB S/CO SERPL IA: 0.15 S/CO — SIGNIFICANT CHANGE UP (ref 0–0.99)
HCV AB SERPL-IMP: SIGNIFICANT CHANGE UP
HGB BLD-MCNC: 10.9 G/DL — LOW (ref 11.5–15.5)
MAGNESIUM SERPL-MCNC: 2.4 MG/DL — SIGNIFICANT CHANGE UP (ref 1.6–2.6)
MCHC RBC-ENTMCNC: 30.4 GM/DL — LOW (ref 32–36)
MCHC RBC-ENTMCNC: 34.2 PG — HIGH (ref 27–34)
MCV RBC AUTO: 112.2 FL — HIGH (ref 80–100)
NRBC # BLD: 0 /100 WBCS — SIGNIFICANT CHANGE UP (ref 0–0)
PHOSPHATE SERPL-MCNC: 5.1 MG/DL — HIGH (ref 2.5–4.5)
PLATELET # BLD AUTO: 190 K/UL — SIGNIFICANT CHANGE UP (ref 150–400)
POTASSIUM SERPL-MCNC: 5 MMOL/L — SIGNIFICANT CHANGE UP (ref 3.5–5.3)
POTASSIUM SERPL-SCNC: 5 MMOL/L — SIGNIFICANT CHANGE UP (ref 3.5–5.3)
RBC # BLD: 3.19 M/UL — LOW (ref 3.8–5.2)
RBC # FLD: 15.6 % — HIGH (ref 10.3–14.5)
SODIUM SERPL-SCNC: 134 MMOL/L — LOW (ref 135–145)
WBC # BLD: 4.42 K/UL — SIGNIFICANT CHANGE UP (ref 3.8–10.5)
WBC # FLD AUTO: 4.42 K/UL — SIGNIFICANT CHANGE UP (ref 3.8–10.5)

## 2020-06-07 RX ADMIN — Medication 2 UNIT(S): at 12:34

## 2020-06-07 RX ADMIN — Medication 2 UNIT(S): at 17:00

## 2020-06-07 RX ADMIN — INSULIN GLARGINE 10 UNIT(S): 100 INJECTION, SOLUTION SUBCUTANEOUS at 21:52

## 2020-06-07 RX ADMIN — ATORVASTATIN CALCIUM 20 MILLIGRAM(S): 80 TABLET, FILM COATED ORAL at 21:52

## 2020-06-07 RX ADMIN — CLOPIDOGREL BISULFATE 75 MILLIGRAM(S): 75 TABLET, FILM COATED ORAL at 12:31

## 2020-06-07 RX ADMIN — OXYCODONE AND ACETAMINOPHEN 1 TABLET(S): 5; 325 TABLET ORAL at 21:51

## 2020-06-07 RX ADMIN — HEPARIN SODIUM 5000 UNIT(S): 5000 INJECTION INTRAVENOUS; SUBCUTANEOUS at 17:14

## 2020-06-07 RX ADMIN — Medication 50 MILLIGRAM(S): at 06:34

## 2020-06-07 RX ADMIN — Medication 1: at 08:05

## 2020-06-07 RX ADMIN — Medication 81 MILLIGRAM(S): at 12:31

## 2020-06-07 RX ADMIN — SEVELAMER CARBONATE 2400 MILLIGRAM(S): 2400 POWDER, FOR SUSPENSION ORAL at 12:30

## 2020-06-07 RX ADMIN — OXYCODONE AND ACETAMINOPHEN 1 TABLET(S): 5; 325 TABLET ORAL at 11:03

## 2020-06-07 RX ADMIN — Medication 2 UNIT(S): at 08:06

## 2020-06-07 RX ADMIN — HEPARIN SODIUM 5000 UNIT(S): 5000 INJECTION INTRAVENOUS; SUBCUTANEOUS at 06:33

## 2020-06-07 RX ADMIN — SEVELAMER CARBONATE 2400 MILLIGRAM(S): 2400 POWDER, FOR SUSPENSION ORAL at 08:02

## 2020-06-07 RX ADMIN — SEVELAMER CARBONATE 2400 MILLIGRAM(S): 2400 POWDER, FOR SUSPENSION ORAL at 17:14

## 2020-06-07 RX ADMIN — Medication 1: at 21:52

## 2020-06-07 RX ADMIN — Medication 0.5 MILLIGRAM(S): at 06:34

## 2020-06-07 RX ADMIN — LISINOPRIL 10 MILLIGRAM(S): 2.5 TABLET ORAL at 06:34

## 2020-06-07 RX ADMIN — Medication 3: at 17:00

## 2020-06-07 NOTE — PROGRESS NOTE ADULT - SUBJECTIVE AND OBJECTIVE BOX
[facility]   [phone]  RENAL FOLLOW UP NOTE  --------------------------------------------------------------------------------  Chief Complaint:    24 hour events/subjective:    seen earlier. states breathing is improving     PAST HISTORY  --------------------------------------------------------------------------------  No significant changes to PMH, PSH, FHx, SHx, unless otherwise noted    ALLERGIES & MEDICATIONS  --------------------------------------------------------------------------------  Allergies    No Known Allergies    Intolerances      Standing Inpatient Medications  aspirin enteric coated 81 milliGRAM(s) Oral daily  atorvastatin 20 milliGRAM(s) Oral at bedtime  buDESOnide    Inhalation Suspension 0.5 milliGRAM(s) Inhalation two times a day  clopidogrel Tablet 75 milliGRAM(s) Oral daily  dextrose 5%. 1000 milliLiter(s) IV Continuous <Continuous>  dextrose 50% Injectable 12.5 Gram(s) IV Push once  dextrose 50% Injectable 25 Gram(s) IV Push once  dextrose 50% Injectable 25 Gram(s) IV Push once  doxercalciferol Injectable 4 MICROGram(s) IV Push once  heparin   Injectable 5000 Unit(s) SubCutaneous two times a day  insulin glargine Injectable (LANTUS) 10 Unit(s) SubCutaneous at bedtime  insulin lispro (HumaLOG) corrective regimen sliding scale   SubCutaneous three times a day before meals  insulin lispro (HumaLOG) corrective regimen sliding scale   SubCutaneous at bedtime  insulin lispro Injectable (HumaLOG) 2 Unit(s) SubCutaneous before breakfast  insulin lispro Injectable (HumaLOG) 2 Unit(s) SubCutaneous before lunch  insulin lispro Injectable (HumaLOG) 2 Unit(s) SubCutaneous before dinner  lisinopril 10 milliGRAM(s) Oral daily  metoprolol succinate ER 50 milliGRAM(s) Oral daily  sevelamer carbonate 2400 milliGRAM(s) Oral three times a day with meals    PRN Inpatient Medications  acetaminophen   Tablet .. 650 milliGRAM(s) Oral every 6 hours PRN  dextrose 40% Gel 15 Gram(s) Oral once PRN  glucagon  Injectable 1 milliGRAM(s) IntraMuscular once PRN  oxycodone    5 mG/acetaminophen 325 mG 1 Tablet(s) Oral every 6 hours PRN      REVIEW OF SYSTEMS  --------------------------------------------------------------------------------    Gen: denies  fevers/chills,  CVS: denies chest pain/palpitations  Resp: denies worsening SOB/Cough  GI: Denies N/V/Abd pain  : Denies dysuria    All other systems were reviewed and are negative, except as noted.    VITALS/PHYSICAL EXAM  --------------------------------------------------------------------------------  T(C): 36.3 (06-07-20 @ 13:51), Max: 37.2 (06-06-20 @ 22:08)  HR: 72 (06-07-20 @ 13:51) (69 - 98)  BP: 169/81 (06-07-20 @ 13:51) (138/76 - 169/81)  RR: 20 (06-07-20 @ 13:51) (18 - 20)  SpO2: 100% (06-07-20 @ 13:51) (96% - 100%)  Wt(kg): --  Height (cm): 157.48 (06-06-20 @ 09:05)  Weight (kg): 63.5 (06-06-20 @ 09:05)  BMI (kg/m2): 25.6 (06-06-20 @ 09:05)  BSA (m2): 1.64 (06-06-20 @ 09:05)      06-06-20 @ 07:01  -  06-07-20 @ 07:00  --------------------------------------------------------  IN: 700 mL / OUT: 3500 mL / NET: -2800 mL    06-07-20 @ 07:01  -  06-07-20 @ 14:20  --------------------------------------------------------  IN: 580 mL / OUT: 0 mL / NET: 580 mL      Physical Exam:  	    Gen: Non toxic appears sob   	+ JVD  	Pulm: decrease bs no   rales  -  ronchi -  wheezing  	CV: RRR, S1S2; no rub  	Abd: +BS, soft, nontender/nondistended  	: No suprapubic tenderness  	UE: Warm, no cyanosis  no clubbing   	LE: Warm, no cyanosis  no clubbing, 4+ edema LLE > RLE  2+   	Neuro: alert and oriented. speech coherent   	Skin: Warm, no decrease skin turgor       LABS/STUDIES  --------------------------------------------------------------------------------              10.9   4.42  >-----------<  190      [06-07-20 @ 13:29]              35.8     134  |  91  |  20  ----------------------------<  213      [06-07-20 @ 13:29]  5.0   |  25  |  4.21        Ca     9.6     [06-07-20 @ 13:29]      Mg     2.4     [06-07-20 @ 13:29]      Phos  5.1     [06-07-20 @ 13:29]    TPro  6.9  /  Alb  4.4  /  TBili  0.3  /  DBili  x   /  AST  16  /  ALT  7   /  AlkPhos  220  [06-06-20 @ 10:02]    PT/INR: PT 12.5 , INR 1.08       [06-06-20 @ 10:02]  PTT: 34.9       [06-06-20 @ 10:02]          [06-06-20 @ 10:02]    Creatinine Trend:  SCr 4.21 [06-07 @ 13:29]  SCr 5.29 [06-06 @ 10:02]  SCr 6.21 [05-19 @ 06:07]  SCr 4.78 [05-18 @ 07:13]  SCr 4.12 [05-17 @ 13:41]                  Iron 67, TIBC 234, %sat 29      [12-04-19 @ 08:38]  Ferritin 1387      [05-06-20 @ 12:17]  PTH -- (Ca 8.5)      [05-04-20 @ 04:39]   1317  PTH -- (Ca 8.3)      [12-04-19 @ 08:38]   952  PTH -- (Ca 9.6)      [06-17-19 @ 18:06]   177  HbA1c 7.7      [04-08-20 @ 08:03]  TSH 1.78      [11-14-19 @ 09:15]  Lipid: chol 108, TG 76, HDL 57, LDL 36      [11-14-19 @ 09:16]

## 2020-06-07 NOTE — PROGRESS NOTE ADULT - SUBJECTIVE AND OBJECTIVE BOX
Patient is a 64y old  Female who presents with a chief complaint of sob (07 Jun 2020 14:20)      SUBJECTIVE / OVERNIGHT EVENTS: feels better  Review of Systems  chest pain no  palpitations no  sob improving   nausea no  headache no    MEDICATIONS  (STANDING):  aspirin enteric coated 81 milliGRAM(s) Oral daily  atorvastatin 20 milliGRAM(s) Oral at bedtime  buDESOnide    Inhalation Suspension 0.5 milliGRAM(s) Inhalation two times a day  clopidogrel Tablet 75 milliGRAM(s) Oral daily  dextrose 5%. 1000 milliLiter(s) (50 mL/Hr) IV Continuous <Continuous>  dextrose 50% Injectable 12.5 Gram(s) IV Push once  dextrose 50% Injectable 25 Gram(s) IV Push once  dextrose 50% Injectable 25 Gram(s) IV Push once  doxercalciferol Injectable 4 MICROGram(s) IV Push once  heparin   Injectable 5000 Unit(s) SubCutaneous two times a day  insulin glargine Injectable (LANTUS) 10 Unit(s) SubCutaneous at bedtime  insulin lispro (HumaLOG) corrective regimen sliding scale   SubCutaneous three times a day before meals  insulin lispro (HumaLOG) corrective regimen sliding scale   SubCutaneous at bedtime  insulin lispro Injectable (HumaLOG) 2 Unit(s) SubCutaneous before breakfast  insulin lispro Injectable (HumaLOG) 2 Unit(s) SubCutaneous before lunch  insulin lispro Injectable (HumaLOG) 2 Unit(s) SubCutaneous before dinner  lisinopril 10 milliGRAM(s) Oral daily  metoprolol succinate ER 50 milliGRAM(s) Oral daily  sevelamer carbonate 2400 milliGRAM(s) Oral three times a day with meals    MEDICATIONS  (PRN):  acetaminophen   Tablet .. 650 milliGRAM(s) Oral every 6 hours PRN Temp greater or equal to 38.5C (101.3F), Mild Pain (1 - 3)  dextrose 40% Gel 15 Gram(s) Oral once PRN Blood Glucose LESS THAN 70 milliGRAM(s)/deciliter  glucagon  Injectable 1 milliGRAM(s) IntraMuscular once PRN Glucose LESS THAN 70 milligrams/deciliter  oxycodone    5 mG/acetaminophen 325 mG 1 Tablet(s) Oral every 6 hours PRN Moderate Pain (4 - 6)      Vital Signs Last 24 Hrs  T(C): 36.4 (07 Jun 2020 16:51), Max: 37.2 (06 Jun 2020 22:08)  T(F): 97.6 (07 Jun 2020 16:51), Max: 98.9 (06 Jun 2020 22:08)  HR: 73 (07 Jun 2020 16:51) (69 - 98)  BP: 168/81 (07 Jun 2020 16:51) (138/76 - 169/81)  BP(mean): --  RR: 20 (07 Jun 2020 16:51) (18 - 20)  SpO2: 100% (07 Jun 2020 16:51) (96% - 100%)    PHYSICAL EXAM:  GENERAL: NAD, well-developed  HEAD:  Atraumatic, Normocephalic  EYES: EOMI, PERRLA, conjunctiva and sclera clear  NECK: Supple, No JVD  CHEST/LUNG: Clear to auscultation bilaterally; No wheeze  HEART: Regular rate and rhythm; No murmurs, rubs, or gallops  ABDOMEN: Soft, Nontender, Nondistended; Bowel sounds present  EXTREMITIES:  2+ bipedal edema L>R  PSYCH: AAOx3  NEUROLOGY: non-focal  SKIN: No rashes or lesions    LABS:                        10.9   4.42  )-----------( 190      ( 07 Jun 2020 13:29 )             35.8     06-07    134<L>  |  91<L>  |  20  ----------------------------<  213<H>  5.0   |  25  |  4.21<H>    Ca    9.6      07 Jun 2020 13:29  Phos  5.1     06-07  Mg     2.4     06-07    TPro  6.9  /  Alb  4.4  /  TBili  0.3  /  DBili  x   /  AST  16  /  ALT  7<L>  /  AlkPhos  220<H>  06-06    PT/INR - ( 06 Jun 2020 10:02 )   PT: 12.5 sec;   INR: 1.08 ratio         PTT - ( 06 Jun 2020 10:02 )  PTT:34.9 sec  CARDIAC MARKERS ( 06 Jun 2020 10:02 )  x     / x     / 313 U/L / x     / x              Culture - Blood (collected 06 Jun 2020 12:10)  Source: .Blood Blood-Peripheral  Preliminary Report (07 Jun 2020 13:01):    No growth to date.    Culture - Blood (collected 06 Jun 2020 12:10)  Source: .Blood Blood-Peripheral  Preliminary Report (07 Jun 2020 13:01):    No growth to date.        RADIOLOGY & ADDITIONAL TESTS:    Imaging Personally Reviewed:    Consultant(s) Notes Reviewed:      Care Discussed with Consultants/Other Providers:

## 2020-06-07 NOTE — PROGRESS NOTE ADULT - ASSESSMENT
64 f with    ESRD  - HD session  - Nephrology evaluation Dr. Schmidt    CAD (coronary artery disease) s/p stents  - continue Rx    CHF (congestive heart failure) EF 40-45%  - continue Rx  - cardiology evaluation Dr. Biswas    COPD (chronic obstructive pulmonary disease)   - nebs    CVA (cerebral infarction) with no residual, 8 yrs ago, prior to heart surgery - ST memory loss  - supportive care     Diabetes mellitus type 1 Insulin Dependent -  - BS control  - ADA diet    Gout   - stable    Hyperlipidemia   - continue Rx    Peripheral vascular disease occluded left fem-pop bypass 5/2015  - stable    Pierce Viera MD pager [phone]

## 2020-06-07 NOTE — PROGRESS NOTE ADULT - ASSESSMENT
65 y/o female with multiple medical issues including ESRD on hd, HTn, DM, recently COVID positive in may presenting with sob and leg pain. pt states felt sob today. reports a nonproductive cough as well. no fevers or chills. was due for dialysis today but did not go. no abdominal pain. in er noticed with pulm edema/sob.      1- ESRD  2- Pulm edema  3- Covid 19 +  4- DM   5- SHPT  6- CAD      she is quite fluid overloaded still next hd in am   covid 19 isolation   insulin to cont   renvela one tab with meals  HTN lisinopril 10 mg daily

## 2020-06-08 ENCOUNTER — TRANSCRIPTION ENCOUNTER (OUTPATIENT)
Age: 65
End: 2020-06-08

## 2020-06-08 VITALS
TEMPERATURE: 99 F | HEART RATE: 69 BPM | SYSTOLIC BLOOD PRESSURE: 141 MMHG | WEIGHT: 138.89 LBS | OXYGEN SATURATION: 100 % | DIASTOLIC BLOOD PRESSURE: 69 MMHG | RESPIRATION RATE: 18 BRPM

## 2020-06-08 LAB
ANION GAP SERPL CALC-SCNC: 21 MMOL/L — HIGH (ref 5–17)
BUN SERPL-MCNC: 34 MG/DL — HIGH (ref 7–23)
CALCIUM SERPL-MCNC: 9.1 MG/DL — SIGNIFICANT CHANGE UP (ref 8.4–10.5)
CHLORIDE SERPL-SCNC: 89 MMOL/L — LOW (ref 96–108)
CO2 SERPL-SCNC: 22 MMOL/L — SIGNIFICANT CHANGE UP (ref 22–31)
CREAT SERPL-MCNC: 5.5 MG/DL — HIGH (ref 0.5–1.3)
GLUCOSE BLDC GLUCOMTR-MCNC: 107 MG/DL — HIGH (ref 70–99)
GLUCOSE BLDC GLUCOMTR-MCNC: 179 MG/DL — HIGH (ref 70–99)
GLUCOSE BLDC GLUCOMTR-MCNC: 213 MG/DL — HIGH (ref 70–99)
GLUCOSE BLDC GLUCOMTR-MCNC: 334 MG/DL — HIGH (ref 70–99)
GLUCOSE SERPL-MCNC: 350 MG/DL — HIGH (ref 70–99)
HCT VFR BLD CALC: 30.5 % — LOW (ref 34.5–45)
HGB BLD-MCNC: 9.4 G/DL — LOW (ref 11.5–15.5)
MAGNESIUM SERPL-MCNC: 2.6 MG/DL — SIGNIFICANT CHANGE UP (ref 1.6–2.6)
MCHC RBC-ENTMCNC: 30.8 GM/DL — LOW (ref 32–36)
MCHC RBC-ENTMCNC: 34.3 PG — HIGH (ref 27–34)
MCV RBC AUTO: 111.3 FL — HIGH (ref 80–100)
NRBC # BLD: 0 /100 WBCS — SIGNIFICANT CHANGE UP (ref 0–0)
PHOSPHATE SERPL-MCNC: 6 MG/DL — HIGH (ref 2.5–4.5)
PLATELET # BLD AUTO: 177 K/UL — SIGNIFICANT CHANGE UP (ref 150–400)
POTASSIUM SERPL-MCNC: 5.4 MMOL/L — HIGH (ref 3.5–5.3)
POTASSIUM SERPL-SCNC: 5.4 MMOL/L — HIGH (ref 3.5–5.3)
RBC # BLD: 2.74 M/UL — LOW (ref 3.8–5.2)
RBC # FLD: 15.6 % — HIGH (ref 10.3–14.5)
SODIUM SERPL-SCNC: 132 MMOL/L — LOW (ref 135–145)
WBC # BLD: 4.1 K/UL — SIGNIFICANT CHANGE UP (ref 3.8–10.5)
WBC # FLD AUTO: 4.1 K/UL — SIGNIFICANT CHANGE UP (ref 3.8–10.5)

## 2020-06-08 PROCEDURE — 85027 COMPLETE CBC AUTOMATED: CPT

## 2020-06-08 PROCEDURE — 85652 RBC SED RATE AUTOMATED: CPT

## 2020-06-08 PROCEDURE — 71045 X-RAY EXAM CHEST 1 VIEW: CPT

## 2020-06-08 PROCEDURE — 85014 HEMATOCRIT: CPT

## 2020-06-08 PROCEDURE — 82947 ASSAY GLUCOSE BLOOD QUANT: CPT

## 2020-06-08 PROCEDURE — 84145 PROCALCITONIN (PCT): CPT

## 2020-06-08 PROCEDURE — 82803 BLOOD GASES ANY COMBINATION: CPT

## 2020-06-08 PROCEDURE — 82010 KETONE BODYS QUAN: CPT

## 2020-06-08 PROCEDURE — 93308 TTE F-UP OR LMTD: CPT

## 2020-06-08 PROCEDURE — 82435 ASSAY OF BLOOD CHLORIDE: CPT

## 2020-06-08 PROCEDURE — 82962 GLUCOSE BLOOD TEST: CPT

## 2020-06-08 PROCEDURE — 86803 HEPATITIS C AB TEST: CPT

## 2020-06-08 PROCEDURE — 83735 ASSAY OF MAGNESIUM: CPT

## 2020-06-08 PROCEDURE — 93005 ELECTROCARDIOGRAM TRACING: CPT

## 2020-06-08 PROCEDURE — 76937 US GUIDE VASCULAR ACCESS: CPT

## 2020-06-08 PROCEDURE — 99285 EMERGENCY DEPT VISIT HI MDM: CPT

## 2020-06-08 PROCEDURE — 82330 ASSAY OF CALCIUM: CPT

## 2020-06-08 PROCEDURE — 86140 C-REACTIVE PROTEIN: CPT

## 2020-06-08 PROCEDURE — 84132 ASSAY OF SERUM POTASSIUM: CPT

## 2020-06-08 PROCEDURE — 84100 ASSAY OF PHOSPHORUS: CPT

## 2020-06-08 PROCEDURE — 94640 AIRWAY INHALATION TREATMENT: CPT

## 2020-06-08 PROCEDURE — 80053 COMPREHEN METABOLIC PANEL: CPT

## 2020-06-08 PROCEDURE — 83605 ASSAY OF LACTIC ACID: CPT

## 2020-06-08 PROCEDURE — 87340 HEPATITIS B SURFACE AG IA: CPT

## 2020-06-08 PROCEDURE — 80048 BASIC METABOLIC PNL TOTAL CA: CPT

## 2020-06-08 PROCEDURE — 86704 HEP B CORE ANTIBODY TOTAL: CPT

## 2020-06-08 PROCEDURE — 85610 PROTHROMBIN TIME: CPT

## 2020-06-08 PROCEDURE — 84295 ASSAY OF SERUM SODIUM: CPT

## 2020-06-08 PROCEDURE — 87040 BLOOD CULTURE FOR BACTERIA: CPT

## 2020-06-08 PROCEDURE — 86706 HEP B SURFACE ANTIBODY: CPT

## 2020-06-08 PROCEDURE — 99261: CPT

## 2020-06-08 PROCEDURE — 85730 THROMBOPLASTIN TIME PARTIAL: CPT

## 2020-06-08 PROCEDURE — 82550 ASSAY OF CK (CPK): CPT

## 2020-06-08 RX ADMIN — Medication 50 MILLIGRAM(S): at 05:43

## 2020-06-08 RX ADMIN — LISINOPRIL 10 MILLIGRAM(S): 2.5 TABLET ORAL at 05:43

## 2020-06-08 RX ADMIN — Medication 2: at 11:43

## 2020-06-08 RX ADMIN — Medication 2 UNIT(S): at 18:28

## 2020-06-08 RX ADMIN — CLOPIDOGREL BISULFATE 75 MILLIGRAM(S): 75 TABLET, FILM COATED ORAL at 11:44

## 2020-06-08 RX ADMIN — SEVELAMER CARBONATE 2400 MILLIGRAM(S): 2400 POWDER, FOR SUSPENSION ORAL at 18:28

## 2020-06-08 RX ADMIN — Medication 4: at 08:22

## 2020-06-08 RX ADMIN — HEPARIN SODIUM 5000 UNIT(S): 5000 INJECTION INTRAVENOUS; SUBCUTANEOUS at 05:43

## 2020-06-08 RX ADMIN — Medication 81 MILLIGRAM(S): at 11:44

## 2020-06-08 RX ADMIN — SEVELAMER CARBONATE 2400 MILLIGRAM(S): 2400 POWDER, FOR SUSPENSION ORAL at 08:22

## 2020-06-08 RX ADMIN — Medication 2 UNIT(S): at 08:22

## 2020-06-08 RX ADMIN — SEVELAMER CARBONATE 2400 MILLIGRAM(S): 2400 POWDER, FOR SUSPENSION ORAL at 11:45

## 2020-06-08 RX ADMIN — OXYCODONE AND ACETAMINOPHEN 1 TABLET(S): 5; 325 TABLET ORAL at 05:44

## 2020-06-08 RX ADMIN — HEPARIN SODIUM 5000 UNIT(S): 5000 INJECTION INTRAVENOUS; SUBCUTANEOUS at 18:28

## 2020-06-08 RX ADMIN — Medication 2 UNIT(S): at 11:43

## 2020-06-08 RX ADMIN — DOXERCALCIFEROL 4 MICROGRAM(S): 2.5 CAPSULE ORAL at 13:57

## 2020-06-08 NOTE — PROGRESS NOTE ADULT - SUBJECTIVE AND OBJECTIVE BOX
Patient is a 64y old  Female who presents with a chief complaint of Shortness of breath following missed dialysis session (08 Jun 2020 14:57)      SUBJECTIVE / OVERNIGHT EVENTS: feels better.  Review of Systems  chest pain no  palpitations no  sob no  nausea no  headache no    MEDICATIONS  (STANDING):  aspirin enteric coated 81 milliGRAM(s) Oral daily  atorvastatin 20 milliGRAM(s) Oral at bedtime  buDESOnide    Inhalation Suspension 0.5 milliGRAM(s) Inhalation two times a day  clopidogrel Tablet 75 milliGRAM(s) Oral daily  dextrose 5%. 1000 milliLiter(s) (50 mL/Hr) IV Continuous <Continuous>  dextrose 50% Injectable 12.5 Gram(s) IV Push once  dextrose 50% Injectable 25 Gram(s) IV Push once  dextrose 50% Injectable 25 Gram(s) IV Push once  heparin   Injectable 5000 Unit(s) SubCutaneous two times a day  insulin glargine Injectable (LANTUS) 10 Unit(s) SubCutaneous at bedtime  insulin lispro (HumaLOG) corrective regimen sliding scale   SubCutaneous three times a day before meals  insulin lispro (HumaLOG) corrective regimen sliding scale   SubCutaneous at bedtime  insulin lispro Injectable (HumaLOG) 2 Unit(s) SubCutaneous before breakfast  insulin lispro Injectable (HumaLOG) 2 Unit(s) SubCutaneous before lunch  insulin lispro Injectable (HumaLOG) 2 Unit(s) SubCutaneous before dinner  lisinopril 10 milliGRAM(s) Oral daily  metoprolol succinate ER 50 milliGRAM(s) Oral daily  sevelamer carbonate 2400 milliGRAM(s) Oral three times a day with meals    MEDICATIONS  (PRN):  acetaminophen   Tablet .. 650 milliGRAM(s) Oral every 6 hours PRN Temp greater or equal to 38.5C (101.3F), Mild Pain (1 - 3)  dextrose 40% Gel 15 Gram(s) Oral once PRN Blood Glucose LESS THAN 70 milliGRAM(s)/deciliter  glucagon  Injectable 1 milliGRAM(s) IntraMuscular once PRN Glucose LESS THAN 70 milligrams/deciliter  oxycodone    5 mG/acetaminophen 325 mG 1 Tablet(s) Oral every 6 hours PRN Moderate Pain (4 - 6)      Vital Signs Last 24 Hrs  T(C): 37.1 (08 Jun 2020 17:10), Max: 37.1 (08 Jun 2020 17:10)  T(F): 98.8 (08 Jun 2020 17:10), Max: 98.8 (08 Jun 2020 17:10)  HR: 69 (08 Jun 2020 17:10) (69 - 73)  BP: 141/69 (08 Jun 2020 17:10) (141/69 - 148/79)  BP(mean): --  RR: 18 (08 Jun 2020 17:10) (18 - 20)  SpO2: 100% (08 Jun 2020 17:10) (99% - 100%)    PHYSICAL EXAM:  GENERAL: NAD, well-developed  HEAD:  Atraumatic, Normocephalic  EYES: EOMI, PERRLA, conjunctiva and sclera clear  NECK: Supple, No JVD  CHEST/LUNG: Clear to auscultation bilaterally; No wheeze  HEART: Regular rate and rhythm; No murmurs, rubs, or gallops  ABDOMEN: Soft, Nontender, Nondistended; Bowel sounds present  EXTREMITIES:  less edema  PSYCH: AAOx3  NEUROLOGY: non-focal  SKIN: No rashes or lesions    LABS:                        9.4    4.10  )-----------( 177      ( 08 Jun 2020 08:55 )             30.5     06-08    132<L>  |  89<L>  |  34<H>  ----------------------------<  350<H>  5.4<H>   |  22  |  5.50<H>    Ca    9.1      08 Jun 2020 08:55  Phos  6.0     06-08  Mg     2.6     06-08                Culture - Blood (collected 06 Jun 2020 12:10)  Source: .Blood Blood-Peripheral  Preliminary Report (07 Jun 2020 13:01):    No growth to date.    Culture - Blood (collected 06 Jun 2020 12:10)  Source: .Blood Blood-Peripheral  Preliminary Report (07 Jun 2020 13:01):    No growth to date.        RADIOLOGY & ADDITIONAL TESTS:    Imaging Personally Reviewed:    Consultant(s) Notes Reviewed:      Care Discussed with Consultants/Other Providers:

## 2020-06-08 NOTE — DISCHARGE NOTE NURSING/CASE MANAGEMENT/SOCIAL WORK - NSDCPNINST_GEN_ALL_CORE
If you have persistent nausea, vomiting, pain, fever, unable to tolerate foods, liquids, unable to urinate call the doctor or go to the nearest hospital. Pt. verbalized understanding of discharge instructions. Pt. alert and oriented x 4. Pt. verbalized understanding of medications prescribed and to follow up with MD in time ordered.

## 2020-06-08 NOTE — DISCHARGE NOTE PROVIDER - CARE PROVIDERS DIRECT ADDRESSES
,DirectAddress_Unknown,[email or web address],DirectAddress_Unknown

## 2020-06-08 NOTE — PROGRESS NOTE ADULT - SUBJECTIVE AND OBJECTIVE BOX
[facility]   [phone]  RENAL FOLLOW UP NOTE  --------------------------------------------------------------------------------  Chief Complaint:    24 hour events/subjective:    seen earlier.   states feels well and breathing is better     PAST HISTORY  --------------------------------------------------------------------------------  No significant changes to PMH, PSH, FHx, SHx, unless otherwise noted    ALLERGIES & MEDICATIONS  --------------------------------------------------------------------------------  Allergies    No Known Allergies    Intolerances      Standing Inpatient Medications  aspirin enteric coated 81 milliGRAM(s) Oral daily  atorvastatin 20 milliGRAM(s) Oral at bedtime  buDESOnide    Inhalation Suspension 0.5 milliGRAM(s) Inhalation two times a day  clopidogrel Tablet 75 milliGRAM(s) Oral daily  dextrose 5%. 1000 milliLiter(s) IV Continuous <Continuous>  dextrose 50% Injectable 12.5 Gram(s) IV Push once  dextrose 50% Injectable 25 Gram(s) IV Push once  dextrose 50% Injectable 25 Gram(s) IV Push once  heparin   Injectable 5000 Unit(s) SubCutaneous two times a day  insulin glargine Injectable (LANTUS) 10 Unit(s) SubCutaneous at bedtime  insulin lispro (HumaLOG) corrective regimen sliding scale   SubCutaneous three times a day before meals  insulin lispro (HumaLOG) corrective regimen sliding scale   SubCutaneous at bedtime  insulin lispro Injectable (HumaLOG) 2 Unit(s) SubCutaneous before breakfast  insulin lispro Injectable (HumaLOG) 2 Unit(s) SubCutaneous before lunch  insulin lispro Injectable (HumaLOG) 2 Unit(s) SubCutaneous before dinner  lisinopril 10 milliGRAM(s) Oral daily  metoprolol succinate ER 50 milliGRAM(s) Oral daily  sevelamer carbonate 2400 milliGRAM(s) Oral three times a day with meals    PRN Inpatient Medications  acetaminophen   Tablet .. 650 milliGRAM(s) Oral every 6 hours PRN  dextrose 40% Gel 15 Gram(s) Oral once PRN  glucagon  Injectable 1 milliGRAM(s) IntraMuscular once PRN  oxycodone    5 mG/acetaminophen 325 mG 1 Tablet(s) Oral every 6 hours PRN      REVIEW OF SYSTEMS  --------------------------------------------------------------------------------    Gen: denies fevers/chills,  CVS: denies chest pain/palpitations  Resp: denies SOB/Cough  GI: Denies N/V/Abd pain  : Denies dysuria    All other systems were reviewed and are negative, except as noted.    VITALS/PHYSICAL EXAM  --------------------------------------------------------------------------------  T(C): 37.1 (06-08-20 @ 17:10), Max: 37.1 (06-08-20 @ 17:10)  HR: 69 (06-08-20 @ 17:10) (69 - 73)  BP: 141/69 (06-08-20 @ 17:10) (141/69 - 148/79)  RR: 18 (06-08-20 @ 17:10) (18 - 20)  SpO2: 100% (06-08-20 @ 17:10) (99% - 100%)  Wt(kg): --        06-07-20 @ 07:01  -  06-08-20 @ 07:00  --------------------------------------------------------  IN: 1020 mL / OUT: 0 mL / NET: 1020 mL    06-08-20 @ 07:01  -  06-08-20 @ 19:05  --------------------------------------------------------  IN: 1380 mL / OUT: 3200 mL / NET: -1820 mL      Physical Exam:  	    Gen: Non toxic appears sob   	+ JVD  	Pulm: decrease bs no   rales  -  ronchi -  wheezing  	CV: RRR, S1S2; no rub  	Abd: +BS, soft, nontender/nondistended  	: No suprapubic tenderness  	UE: Warm, no cyanosis  no clubbing   	LE: Warm, no cyanosis  no clubbing, 3+ edema LLE > RLE  2+   	Neuro: alert and oriented. speech coherent   	Skin: Warm, no decrease skin turgor   	    LABS/STUDIES  --------------------------------------------------------------------------------              9.4    4.10  >-----------<  177      [06-08-20 @ 08:55]              30.5     132  |  89  |  34  ----------------------------<  350      [06-08-20 @ 08:55]  5.4   |  22  |  5.50        Ca     9.1     [06-08-20 @ 08:55]      Mg     2.6     [06-08-20 @ 08:55]      Phos  6.0     [06-08-20 @ 08:55]            Creatinine Trend:  SCr 5.50 [06-08 @ 08:55]  SCr 4.21 [06-07 @ 13:29]  SCr 5.29 [06-06 @ 10:02]  SCr 6.21 [05-19 @ 06:07]  SCr 4.78 [05-18 @ 07:13]                  Iron 67, TIBC 234, %sat 29      [12-04-19 @ 08:38]  Ferritin 1387      [05-06-20 @ 12:17]  PTH -- (Ca 8.5)      [05-04-20 @ 04:39]   1317  PTH -- (Ca 8.3)      [12-04-19 @ 08:38]   952  PTH -- (Ca 9.6)      [06-17-19 @ 18:06]   177  HbA1c 7.7      [04-08-20 @ 08:03]  TSH 1.78      [11-14-19 @ 09:15]  Lipid: chol 108, TG 76, HDL 57, LDL 36      [11-14-19 @ 09:16]

## 2020-06-08 NOTE — DISCHARGE NOTE PROVIDER - NSDCFUADDINST_GEN_ALL_CORE_FT
Make appointments to follow up with your physician(s) - bring all discharge paperwork including discharge medication list to follow up appointments  Resume dialysis on your regular schedule - follow up with your kidney specialist

## 2020-06-08 NOTE — DISCHARGE NOTE NURSING/CASE MANAGEMENT/SOCIAL WORK - PATIENT PORTAL LINK FT
You can access the FollowMyHealth Patient Portal offered by [facility] by registering at the following website: [email or web address]. By joining [facility]’s FollowMyHealth portal, you will also be able to view your health information using other applications (apps) compatible with our system.

## 2020-06-08 NOTE — DISCHARGE NOTE PROVIDER - PROVIDER TOKENS
PROVIDER:[TOKEN:[[id number]]],PROVIDER:[TOKEN:[[id number]]],PROVIDER:[TOKEN:[[id number]]]

## 2020-06-08 NOTE — PATIENT PROFILE ADULT - NSASFALLATTEMPTOOB_GEN_A_NUR
no

## 2020-06-08 NOTE — PROGRESS NOTE ADULT - ASSESSMENT
65 y/o female with multiple medical issues including ESRD on hd, HTn, DM, recently COVID positive in may presenting with sob and leg pain. pt states felt sob today. reports a nonproductive cough as well. no fevers or chills. was due for dialysis today but did not go. no abdominal pain. in er noticed with pulm edema/sob.      1- ESRD  2- Pulm edema  3- Covid 19 +  4- DM   5- SHPT  6- CAD      hd F 160 3.5 hr 2.3 liter bfr 400 dfr 600   covid 19 isolation   insulin to cont   renvela one tab with meals  HTN lisinopril 10 mg daily

## 2020-06-08 NOTE — DISCHARGE NOTE PROVIDER - NSDCCPCAREPLAN_GEN_ALL_CORE_FT
PRINCIPAL DISCHARGE DIAGNOSIS  Diagnosis: Shortness of breath  Assessment and Plan of Treatment: resolved      SECONDARY DISCHARGE DIAGNOSES  Diagnosis: Volume overload  Assessment and Plan of Treatment:

## 2020-06-08 NOTE — PROGRESS NOTE ADULT - ASSESSMENT
64 f with    ESRD  - HD session  - Nephrology follow Dr. Schmidt    CAD (coronary artery disease) s/p stents  - continue Rx    CHF (congestive heart failure) EF 40-45%  - continue Rx  - cardiology evaluation Dr. Biswas    COPD (chronic obstructive pulmonary disease)   - nebs    CVA (cerebral infarction) with no residual, 8 yrs ago, prior to heart surgery - ST memory loss  - supportive care     Diabetes mellitus type 1 Insulin Dependent -  - BS control  - ADA diet    Gout   - stable    Hyperlipidemia   - continue Rx    Peripheral vascular disease occluded left fem-pop bypass 5/2015  - stable    DC home after HD if arrangements for OTP HD in place.    Pierce Viera MD pager [phone]

## 2020-06-08 NOTE — DISCHARGE NOTE PROVIDER - NSDCMRMEDTOKEN_GEN_ALL_CORE_FT
aspirin 81 mg oral delayed release tablet: 1 tab(s) orally once a day  atorvastatin 20 mg oral tablet: 1 tab(s) orally once a day  Basaglar KwikPen 100 units/mL subcutaneous solution: 10 unit(s) subcutaneous once a day (at bedtime)  budesonide 0.5 mg/2 mL inhalation suspension: 2 milliliter(s) inhaled 2 times a day, As Needed  clopidogrel 75 mg oral tablet: 1 tab(s) orally once a day  lisinopril 10 mg oral tablet: 1 tab(s) orally once a day   NovoLOG FlexPen 100 units/mL injectable solution: 2 unit(s) subcutaneous 3 times a day (before meals), As Needed  sevelamer carbonate 800 mg oral tablet: 3 tab(s) orally 3 times a day (with meals)  Toprol-XL 50 mg oral tablet, extended release: 1 tab(s) orally once a day (at bedtime)  Veltassa 8.4 g oral powder for reconstitution: 8.4 gram(s) orally once a day  Note: pt noncompliant

## 2020-06-15 RX ADMIN — Medication 4: at 08:01

## 2020-06-18 ENCOUNTER — INPATIENT (INPATIENT)
Facility: HOSPITAL | Age: 65
LOS: 4 days | Discharge: HOME | DRG: 291 | End: 2020-06-23
Attending: INTERNAL MEDICINE | Admitting: INTERNAL MEDICINE
Payer: MEDICARE

## 2020-06-18 VITALS
SYSTOLIC BLOOD PRESSURE: 137 MMHG | WEIGHT: 119.93 LBS | DIASTOLIC BLOOD PRESSURE: 79 MMHG | OXYGEN SATURATION: 100 % | HEART RATE: 70 BPM | TEMPERATURE: 98 F | RESPIRATION RATE: 22 BRPM | HEIGHT: 64 IN

## 2020-06-18 DIAGNOSIS — E10.22 TYPE 1 DIABETES MELLITUS WITH DIABETIC CHRONIC KIDNEY DISEASE: ICD-10-CM

## 2020-06-18 DIAGNOSIS — I25.10 ATHEROSCLEROTIC HEART DISEASE OF NATIVE CORONARY ARTERY WITHOUT ANGINA PECTORIS: ICD-10-CM

## 2020-06-18 DIAGNOSIS — M79.604 PAIN IN RIGHT LEG: ICD-10-CM

## 2020-06-18 DIAGNOSIS — J15.9 UNSPECIFIED BACTERIAL PNEUMONIA: ICD-10-CM

## 2020-06-18 DIAGNOSIS — N18.6 END STAGE RENAL DISEASE: ICD-10-CM

## 2020-06-18 DIAGNOSIS — I50.23 ACUTE ON CHRONIC SYSTOLIC (CONGESTIVE) HEART FAILURE: ICD-10-CM

## 2020-06-18 DIAGNOSIS — Z98.89 OTHER SPECIFIED POSTPROCEDURAL STATES: Chronic | ICD-10-CM

## 2020-06-18 DIAGNOSIS — Z71.89 OTHER SPECIFIED COUNSELING: ICD-10-CM

## 2020-06-18 LAB
ALBUMIN SERPL ELPH-MCNC: 4.1 G/DL — SIGNIFICANT CHANGE UP (ref 3.3–5)
ALP SERPL-CCNC: 228 U/L — HIGH (ref 40–120)
ALT FLD-CCNC: 8 U/L — LOW (ref 10–45)
ANION GAP SERPL CALC-SCNC: 18 MMOL/L — HIGH (ref 5–17)
ANISOCYTOSIS BLD QL: SLIGHT — SIGNIFICANT CHANGE UP
APTT BLD: 33.4 SEC — SIGNIFICANT CHANGE UP (ref 27.5–36.3)
AST SERPL-CCNC: 17 U/L — SIGNIFICANT CHANGE UP (ref 10–40)
BASE EXCESS BLDV CALC-SCNC: 3.4 MMOL/L — HIGH (ref -2–2)
BASOPHILS # BLD AUTO: 0 K/UL — SIGNIFICANT CHANGE UP (ref 0–0.2)
BASOPHILS NFR BLD AUTO: 0 % — SIGNIFICANT CHANGE UP (ref 0–2)
BILIRUB SERPL-MCNC: 0.3 MG/DL — SIGNIFICANT CHANGE UP (ref 0.2–1.2)
BUN SERPL-MCNC: 34 MG/DL — HIGH (ref 7–23)
CA-I SERPL-SCNC: 1.2 MMOL/L — SIGNIFICANT CHANGE UP (ref 1.12–1.3)
CALCIUM SERPL-MCNC: 9.4 MG/DL — SIGNIFICANT CHANGE UP (ref 8.4–10.5)
CHLORIDE BLDV-SCNC: 93 MMOL/L — LOW (ref 96–108)
CHLORIDE SERPL-SCNC: 93 MMOL/L — LOW (ref 96–108)
CO2 BLDV-SCNC: 32 MMOL/L — HIGH (ref 22–30)
CO2 SERPL-SCNC: 26 MMOL/L — SIGNIFICANT CHANGE UP (ref 22–31)
CREAT SERPL-MCNC: 5.31 MG/DL — HIGH (ref 0.5–1.3)
CRP SERPL-MCNC: 2.34 MG/DL — HIGH (ref 0–0.4)
D DIMER BLD IA.RAPID-MCNC: 435 NG/ML DDU — HIGH
EOSINOPHIL # BLD AUTO: 0.11 K/UL — SIGNIFICANT CHANGE UP (ref 0–0.5)
EOSINOPHIL NFR BLD AUTO: 2.6 % — SIGNIFICANT CHANGE UP (ref 0–6)
GAS PNL BLDV: 139 MMOL/L — SIGNIFICANT CHANGE UP (ref 135–145)
GAS PNL BLDV: SIGNIFICANT CHANGE UP
GAS PNL BLDV: SIGNIFICANT CHANGE UP
GLUCOSE BLDV-MCNC: 123 MG/DL — HIGH (ref 70–99)
GLUCOSE SERPL-MCNC: 136 MG/DL — HIGH (ref 70–99)
HCO3 BLDV-SCNC: 30 MMOL/L — HIGH (ref 21–29)
HCT VFR BLD CALC: 33.3 % — LOW (ref 34.5–45)
HCT VFR BLDA CALC: 34 % — LOW (ref 39–50)
HGB BLD CALC-MCNC: 10.9 G/DL — LOW (ref 11.5–15.5)
HGB BLD-MCNC: 10.3 G/DL — LOW (ref 11.5–15.5)
HYPOCHROMIA BLD QL: SLIGHT — SIGNIFICANT CHANGE UP
INR BLD: 1.13 RATIO — SIGNIFICANT CHANGE UP (ref 0.88–1.16)
LACTATE BLDV-MCNC: 2.3 MMOL/L — HIGH (ref 0.7–2)
LYMPHOCYTES # BLD AUTO: 0.19 K/UL — LOW (ref 1–3.3)
LYMPHOCYTES # BLD AUTO: 4.4 % — LOW (ref 13–44)
MACROCYTES BLD QL: SIGNIFICANT CHANGE UP
MANUAL SMEAR VERIFICATION: SIGNIFICANT CHANGE UP
MCHC RBC-ENTMCNC: 30.9 GM/DL — LOW (ref 32–36)
MCHC RBC-ENTMCNC: 34.2 PG — HIGH (ref 27–34)
MCV RBC AUTO: 110.6 FL — HIGH (ref 80–100)
MONOCYTES # BLD AUTO: 0.53 K/UL — SIGNIFICANT CHANGE UP (ref 0–0.9)
MONOCYTES NFR BLD AUTO: 12.3 % — SIGNIFICANT CHANGE UP (ref 2–14)
NEUTROPHILS # BLD AUTO: 3.03 K/UL — SIGNIFICANT CHANGE UP (ref 1.8–7.4)
NEUTROPHILS NFR BLD AUTO: 69.3 % — SIGNIFICANT CHANGE UP (ref 43–77)
NEUTS BAND # BLD: 0.9 % — SIGNIFICANT CHANGE UP (ref 0–8)
NT-PROBNP SERPL-SCNC: HIGH PG/ML (ref 0–300)
OVALOCYTES BLD QL SMEAR: SLIGHT — SIGNIFICANT CHANGE UP
PCO2 BLDV: 60 MMHG — HIGH (ref 35–50)
PH BLDV: 7.32 — LOW (ref 7.35–7.45)
PLAT MORPH BLD: NORMAL — SIGNIFICANT CHANGE UP
PLATELET # BLD AUTO: 200 K/UL — SIGNIFICANT CHANGE UP (ref 150–400)
PO2 BLDV: 32 MMHG — SIGNIFICANT CHANGE UP (ref 25–45)
POIKILOCYTOSIS BLD QL AUTO: SLIGHT — SIGNIFICANT CHANGE UP
POLYCHROMASIA BLD QL SMEAR: SLIGHT — SIGNIFICANT CHANGE UP
POTASSIUM BLDV-SCNC: 5.1 MMOL/L — SIGNIFICANT CHANGE UP (ref 3.5–5.3)
POTASSIUM SERPL-MCNC: 5 MMOL/L — SIGNIFICANT CHANGE UP (ref 3.5–5.3)
POTASSIUM SERPL-SCNC: 5 MMOL/L — SIGNIFICANT CHANGE UP (ref 3.5–5.3)
PROCALCITONIN SERPL-MCNC: 0.54 NG/ML — HIGH (ref 0.02–0.1)
PROT SERPL-MCNC: 6.6 G/DL — SIGNIFICANT CHANGE UP (ref 6–8.3)
PROTHROM AB SERPL-ACNC: 13 SEC — HIGH (ref 10–12.9)
RBC # BLD: 3.01 M/UL — LOW (ref 3.8–5.2)
RBC # FLD: 15.1 % — HIGH (ref 10.3–14.5)
RBC BLD AUTO: ABNORMAL
SAO2 % BLDV: 46 % — LOW (ref 67–88)
SARS-COV-2 RNA SPEC QL NAA+PROBE: SIGNIFICANT CHANGE UP
SODIUM SERPL-SCNC: 137 MMOL/L — SIGNIFICANT CHANGE UP (ref 135–145)
VARIANT LYMPHS # BLD: 10.5 % — HIGH (ref 0–6)
WBC # BLD: 4.31 K/UL — SIGNIFICANT CHANGE UP (ref 3.8–10.5)
WBC # FLD AUTO: 4.31 K/UL — SIGNIFICANT CHANGE UP (ref 3.8–10.5)

## 2020-06-18 PROCEDURE — 99285 EMERGENCY DEPT VISIT HI MDM: CPT | Mod: CS,GC

## 2020-06-18 PROCEDURE — 71045 X-RAY EXAM CHEST 1 VIEW: CPT | Mod: 26,CS

## 2020-06-18 PROCEDURE — 99223 1ST HOSP IP/OBS HIGH 75: CPT

## 2020-06-18 RX ORDER — BUDESONIDE, MICRONIZED 100 %
2 POWDER (GRAM) MISCELLANEOUS
Qty: 1 | Refills: 0

## 2020-06-18 RX ORDER — CLOPIDOGREL BISULFATE 75 MG/1
1 TABLET, FILM COATED ORAL
Qty: 0 | Refills: 0 | DISCHARGE

## 2020-06-18 RX ORDER — ASPIRIN/CALCIUM CARB/MAGNESIUM 324 MG
81 TABLET ORAL DAILY
Refills: 0 | Status: DISCONTINUED | OUTPATIENT
Start: 2020-06-18 | End: 2020-06-23

## 2020-06-18 RX ORDER — CLOPIDOGREL BISULFATE 75 MG/1
75 TABLET, FILM COATED ORAL DAILY
Refills: 0 | Status: DISCONTINUED | OUTPATIENT
Start: 2020-06-18 | End: 2020-06-23

## 2020-06-18 RX ORDER — SEVELAMER CARBONATE 2400 MG/1
800 POWDER, FOR SUSPENSION ORAL
Refills: 0 | Status: DISCONTINUED | OUTPATIENT
Start: 2020-06-18 | End: 2020-06-23

## 2020-06-18 RX ORDER — ATORVASTATIN CALCIUM 80 MG/1
20 TABLET, FILM COATED ORAL AT BEDTIME
Refills: 0 | Status: DISCONTINUED | OUTPATIENT
Start: 2020-06-18 | End: 2020-06-23

## 2020-06-18 RX ORDER — ATORVASTATIN CALCIUM 80 MG/1
1 TABLET, FILM COATED ORAL
Qty: 0 | Refills: 0 | DISCHARGE

## 2020-06-18 RX ORDER — SODIUM CHLORIDE 9 MG/ML
1000 INJECTION, SOLUTION INTRAVENOUS
Refills: 0 | Status: DISCONTINUED | OUTPATIENT
Start: 2020-06-18 | End: 2020-06-23

## 2020-06-18 RX ORDER — CEFTRIAXONE 500 MG/1
1000 INJECTION, POWDER, FOR SOLUTION INTRAMUSCULAR; INTRAVENOUS EVERY 24 HOURS
Refills: 0 | Status: DISCONTINUED | OUTPATIENT
Start: 2020-06-18 | End: 2020-06-21

## 2020-06-18 RX ORDER — ASPIRIN/CALCIUM CARB/MAGNESIUM 324 MG
1 TABLET ORAL
Qty: 30 | Refills: 0

## 2020-06-18 RX ORDER — METOPROLOL TARTRATE 50 MG
50 TABLET ORAL DAILY
Refills: 0 | Status: DISCONTINUED | OUTPATIENT
Start: 2020-06-18 | End: 2020-06-23

## 2020-06-18 RX ORDER — DEXTROSE 50 % IN WATER 50 %
15 SYRINGE (ML) INTRAVENOUS ONCE
Refills: 0 | Status: DISCONTINUED | OUTPATIENT
Start: 2020-06-18 | End: 2020-06-23

## 2020-06-18 RX ORDER — INSULIN GLARGINE 100 [IU]/ML
10 INJECTION, SOLUTION SUBCUTANEOUS AT BEDTIME
Refills: 0 | Status: DISCONTINUED | OUTPATIENT
Start: 2020-06-18 | End: 2020-06-23

## 2020-06-18 RX ORDER — INSULIN LISPRO 100/ML
VIAL (ML) SUBCUTANEOUS
Refills: 0 | Status: DISCONTINUED | OUTPATIENT
Start: 2020-06-18 | End: 2020-06-23

## 2020-06-18 RX ORDER — ACETAMINOPHEN 500 MG
650 TABLET ORAL EVERY 6 HOURS
Refills: 0 | Status: DISCONTINUED | OUTPATIENT
Start: 2020-06-18 | End: 2020-06-23

## 2020-06-18 RX ORDER — DEXTROSE 50 % IN WATER 50 %
12.5 SYRINGE (ML) INTRAVENOUS ONCE
Refills: 0 | Status: DISCONTINUED | OUTPATIENT
Start: 2020-06-18 | End: 2020-06-23

## 2020-06-18 RX ORDER — INSULIN GLARGINE 100 [IU]/ML
10 INJECTION, SOLUTION SUBCUTANEOUS
Qty: 0 | Refills: 0 | DISCHARGE

## 2020-06-18 RX ORDER — HEPARIN SODIUM 5000 [USP'U]/ML
5000 INJECTION INTRAVENOUS; SUBCUTANEOUS EVERY 8 HOURS
Refills: 0 | Status: DISCONTINUED | OUTPATIENT
Start: 2020-06-18 | End: 2020-06-23

## 2020-06-18 RX ORDER — PATIROMER 8.4 G/1
8.4 POWDER, FOR SUSPENSION ORAL
Qty: 0 | Refills: 0 | DISCHARGE

## 2020-06-18 RX ORDER — GLUCAGON INJECTION, SOLUTION 0.5 MG/.1ML
1 INJECTION, SOLUTION SUBCUTANEOUS ONCE
Refills: 0 | Status: DISCONTINUED | OUTPATIENT
Start: 2020-06-18 | End: 2020-06-23

## 2020-06-18 RX ORDER — AZITHROMYCIN 500 MG/1
500 TABLET, FILM COATED ORAL ONCE
Refills: 0 | Status: COMPLETED | OUTPATIENT
Start: 2020-06-18 | End: 2020-06-18

## 2020-06-18 RX ORDER — INSULIN LISPRO 100/ML
2 VIAL (ML) SUBCUTANEOUS
Refills: 0 | Status: DISCONTINUED | OUTPATIENT
Start: 2020-06-18 | End: 2020-06-23

## 2020-06-18 RX ORDER — DEXTROSE 50 % IN WATER 50 %
25 SYRINGE (ML) INTRAVENOUS ONCE
Refills: 0 | Status: DISCONTINUED | OUTPATIENT
Start: 2020-06-18 | End: 2020-06-23

## 2020-06-18 RX ORDER — AZITHROMYCIN 500 MG/1
500 TABLET, FILM COATED ORAL EVERY 24 HOURS
Refills: 0 | Status: DISCONTINUED | OUTPATIENT
Start: 2020-06-18 | End: 2020-06-21

## 2020-06-18 RX ORDER — INSULIN LISPRO 100/ML
VIAL (ML) SUBCUTANEOUS AT BEDTIME
Refills: 0 | Status: DISCONTINUED | OUTPATIENT
Start: 2020-06-18 | End: 2020-06-23

## 2020-06-18 RX ORDER — BUDESONIDE, MICRONIZED 100 %
0.5 POWDER (GRAM) MISCELLANEOUS
Refills: 0 | Status: DISCONTINUED | OUTPATIENT
Start: 2020-06-18 | End: 2020-06-19

## 2020-06-18 RX ORDER — SEVELAMER CARBONATE 2400 MG/1
3 POWDER, FOR SUSPENSION ORAL
Qty: 0 | Refills: 0 | DISCHARGE

## 2020-06-18 RX ORDER — LISINOPRIL 2.5 MG/1
10 TABLET ORAL DAILY
Refills: 0 | Status: DISCONTINUED | OUTPATIENT
Start: 2020-06-18 | End: 2020-06-23

## 2020-06-18 RX ORDER — METOPROLOL TARTRATE 50 MG
1 TABLET ORAL
Qty: 0 | Refills: 0 | DISCHARGE

## 2020-06-18 RX ORDER — CEFTRIAXONE 500 MG/1
1000 INJECTION, POWDER, FOR SOLUTION INTRAMUSCULAR; INTRAVENOUS ONCE
Refills: 0 | Status: COMPLETED | OUTPATIENT
Start: 2020-06-18 | End: 2020-06-18

## 2020-06-18 RX ADMIN — Medication 650 MILLIGRAM(S): at 22:22

## 2020-06-18 RX ADMIN — AZITHROMYCIN 250 MILLIGRAM(S): 500 TABLET, FILM COATED ORAL at 19:54

## 2020-06-18 RX ADMIN — CEFTRIAXONE 100 MILLIGRAM(S): 500 INJECTION, POWDER, FOR SOLUTION INTRAMUSCULAR; INTRAVENOUS at 18:58

## 2020-06-18 NOTE — H&P ADULT - NSHPPHYSICALEXAM_GEN_ALL_CORE
Vital Signs Last 24 Hrs  T(C): 36.5 (18 Jun 2020 17:14), Max: 36.8 (18 Jun 2020 16:44)  T(F): 97.7 (18 Jun 2020 17:14), Max: 98.3 (18 Jun 2020 16:44)  HR: 85 (18 Jun 2020 20:06) (70 - 85)  BP: 135/64 (18 Jun 2020 20:06) (135/64 - 137/79)  BP(mean): --  RR: 20 (18 Jun 2020 20:06) (18 - 22)  SpO2: 100% (18 Jun 2020 20:06) (96% - 100%)    PHYSICAL EXAM:  GENERAL: NAD, well-developed  HEAD:  Atraumatic, normocephalic  EYES: EOMI, conjunctiva and sclera clear  NECK: Supple, no JVD  CHEST/LUNG: Clear to auscultation bilaterally; no wheezing or rales  HEART: Regular rate and rhythm; +systolic murmur  ABDOMEN: Soft, nontender, nondistended; bowel sounds present  EXTREMITIES: b/l LE edema, L>R, +pain on palpation, no warmth/erythema   PSYCH: calm affect, not anxious  NEUROLOGY: non-focal, AAOx3  SKIN: No rashes or lesions  MUSCULOSKELETAL: no back pain, moving all extremities

## 2020-06-18 NOTE — H&P ADULT - NSHPREVIEWOFSYSTEMS_GEN_ALL_CORE
REVIEW OF SYSTEMS:    CONSTITUTIONAL: +weakness, no fevers, no chills  EYES/ENT: No visual changes;  no throat pain   NECK: No pain or stiffness  RESPIRATORY: as per HPI  CARDIOVASCULAR: No chest pain or palpitations  GASTROINTESTINAL: no nausea, vomiting, no abdominal pain, no BRBPR  GENITOURINARY: +anuria  NEUROLOGICAL: no headaches, no confusion   MUSCULOSKELETAL: +back pain, no focal weakness, +LE pain   SKIN: No rashes, or lesions   PSYCH: no anxiety, depression  HEME: no gum bleeding, no bruising

## 2020-06-18 NOTE — ED ADULT NURSE NOTE - OBJECTIVE STATEMENT
Patient is a 64 yr old female with a PM Hof ESRD/COPD/CHF/strokes/MIs who presents to the ED from home via EMS complaining of left leg pain and SOB. Patient was covid +2 weeks ago and was d/c from hospital 10 days ago and reports not feeling well/SOB/coughing. Patient on dialysis makes no urine, left arm bruit audible, dialysis on T/TH/SAT, and is due for dialysis tonight. Upon assessment, patient is AOx4, afebrile, heart sounds S1/S2 present, lung sounds upon ausculation, +cough, sating 96% on RA, placed on 2LNC for comfort sating 99%, abdomen soft/non distended/+bowel sounds all 4 quadrants, left leg +3 pitting edema/tender to touch, ecchymosis on skin, and denies n/v/d/f/chills/CP. Provider assessed at bedside, heplock placed with labs drawn/sent, all safety precautions maintained, call bell at bedside, and will continue to monitor.

## 2020-06-18 NOTE — H&P ADULT - NSHPLABSRESULTS_GEN_ALL_CORE
Labs, imaging and EKG personally reviewed and interpreted by me. EKG sinus with low voltage, TWI V5/V6,  - unchanged form prior EKGs.                           10.3   4.31  )-----------( 200      ( 18 Jun 2020 17:25 )             33.3     06-18    137  |  93<L>  |  34<H>  ----------------------------<  136<H>  5.0   |  26  |  5.31<H>    Ca    9.4      18 Jun 2020 17:25    TPro  6.6  /  Alb  4.1  /  TBili  0.3  /  DBili  x   /  AST  17  /  ALT  8<L>  /  AlkPhos  228<H>  06-18      PT/INR - ( 18 Jun 2020 17:25 )   PT: 13.0 sec;   INR: 1.13 ratio    PTT - ( 18 Jun 2020 17:25 )  PTT:33.4 sec      < from: Xray Chest 1 View AP/PA (06.18.20 @ 18:31) >  ******PRELIMINARY REPORT******        INTERPRETATION:  New hazy right lower lung opacity, compatible with pneumonia.

## 2020-06-18 NOTE — ED PROVIDER NOTE - CLINICAL SUMMARY MEDICAL DECISION MAKING FREE TEXT BOX
63 y/o F w/ h/o ESRD on HD (T/Th/S), HTN, T2DM, COVID+ 2 wks ago p/w sob and b/l leg pain. Pt tachypneic SpO2 100% on 2L O2. Will check COVID labs, TBA for urgent dialysis. 63 y/o F w/ h/o ESRD on HD (T/Th/S), HTN, T2DM, COVID+ 2 wks ago p/w sob and b/l leg pain. Pt tachypneic SpO2 100% on 2L O2. Will check COVID labs, TBA for urgent dialysis.    REMA Morales MD: Pt is a 63 y/o female with PMH of h/o ESRD on HD (T/Th/S, last HD Tues), HTN, T2DM, COVID+ 2 wks ago p/w sob and b/l leg pain. Pt states that she was recently d/c from hospital 10d ago and has not been feeling well. Been c/o nonproductive cough, SOB and generalized fatigue for the past few days. Pt last complete dialysis was Tuesday and was due for dialysis tonight but did not go because she was not feeling well. Denies fevers, chills, nausea, vomiting, wheezing, hemoptysis, abdominal pain, back pain, dysuria, numbness, tingling, sick contacts, travel history, recent surgeries, calf tenderness, cancer history. Ddx includes, however, is not limited to: covid19, pna, electrolyte abnormality, metabolic d/o, other. Plan: basic labs, CXR, 12-lead, likely TBA for HD

## 2020-06-18 NOTE — H&P ADULT - PROBLEM SELECTOR PLAN 1
pt with SOB and cough, with CXR showing new RLL opacity c/w PNA  will treat as CAP with ceftriaxone and azithromycin   f/u cultures  monitor fever curve

## 2020-06-18 NOTE — H&P ADULT - ASSESSMENT
64F with PMH of ESRD on HD (TTS), CAD s/p stents, CHF (EF 35%) with severe AS, T1DM, COPD, CVA with no residuals p/w acute onset SOB and LE pain, admitted for RLL PNA and volume overload in setting of missed HD

## 2020-06-18 NOTE — H&P ADULT - HISTORY OF PRESENT ILLNESS
64F with PMH of ESRD on HD (TTS), CAD s/p stents, CHF (EF 35%) with severe AS, T1DM, COPD, CVA with no residuals p/w acute onset SOB and LE pain. Pt was recently hospitalized for SOB last week, found to be persistently COVID+ during that visit. She returns now for SOB that was significantly worse today, unable to carry out usual activities; felt so bad, she skipped her scheduled HD session; endorsing some associated cough, denies wheezing or sputum production. Denies any associated chest pain or palpitations. Also c/o of acute on chronic b/l LE pain, L>R, non radiating; states she has this pain often, but it was worse today, improved with walking around, worse when laying down or with palpation. Denies any fevers, chills, nausea/vomiting, abdominal pain, diarrhea, +anuric, no headaches, LOC. Currently LE pain and SOB have improved.

## 2020-06-18 NOTE — H&P ADULT - PROBLEM SELECTOR PLAN 4
acute on chronic b/l LE pain, L>R; currently improved in ED without intervention - likely in setting of PAD. Also with significant swelling in LEs  will check US dopplers to r/o DVT   fluid removal with HD as above   tylenol for pain control  outpt vascular f/u

## 2020-06-18 NOTE — H&P ADULT - PROBLEM SELECTOR PLAN 2
ESRD on HD, missed HD session earlier today  renal evaluating pt, likely HD tonight \  c/w renvela   monitor electrolytes

## 2020-06-18 NOTE — ED PROVIDER NOTE - OBJECTIVE STATEMENT
65 y/o F w/ h/o ESRD on HD (T/Th/S), HTN, T2DM, COVID+ 2 wks ago p/w sob and b/l leg pain. Pt states that she was recently d/c from hospital 10d ago and has not been feeling well. Been c/o nonproductive cough, SOB and generalized fatigue for the past few days. Pt last complete dialysis was Tuesday and was due for dialysis tonight but did not go because she was not feeling well. 65 y/o F w/ h/o ESRD on HD (T/Th/S), HTN, T2DM, COVID+ 2 wks ago p/w sob and b/l leg pain. Pt states that she was recently d/c from hospital 10d ago and has not been feeling well. Been c/o nonproductive cough, SOB and generalized fatigue for the past few days. Pt last complete dialysis was Tuesday and was due for dialysis tonight but did not go because she was not feeling well. Denies fevers, chills, nausea, vomiting, wheezing, hemoptysis, abdominal pain, back pain, dysuria, numbness, tingling, sick contacts, travel history, recent surgeries, calf tenderness, cancer history. 65 y/o F w/ h/o ESRD on HD (T/Th/S), HTN, T2DM, COVID+ 2 wks ago p/w sob and b/l leg pain. Pt states that she was recently d/c from hospital 10d ago and has not been feeling well. Been c/o nonproductive cough, SOB and generalized fatigue for the past few days. Pt last complete dialysis was Tuesday and was due for dialysis tonight but did not go because she was not feeling well. Denies fevers, chills, nausea, vomiting, wheezing, hemoptysis, abdominal pain, back pain, dysuria, numbness, tingling, sick contacts, travel history, recent surgeries, calf tenderness, cancer history.    Nephrology Daisy Burger -[phone]

## 2020-06-18 NOTE — H&P ADULT - PROBLEM SELECTOR PLAN 3
pt with SOB, elevated BNP and lower extremity edema c/w likely HF exacerbation, with last EF 35% and severe AS   unable to diurese 2/2 anuria   fluid removal via HD per renal   monitor I/Os, daily weights  monitor electrolytes   repeat TTE  c/w BB and ACEi

## 2020-06-18 NOTE — ED PROVIDER NOTE - PHYSICAL EXAMINATION
GENERAL: mild respiratory distress, non-toxic appearing  HEAD: normocephalic, atraumatic  HEENT: normal conjunctiva, oral mucosa moist  CARDIAC: regular rate and rhythm, normal S1S2, no appreciable murmurs  PULM: normal breath sounds, clear to ascultation bilaterally, no rales, rhonchi, wheezing  GI: abdomen nondistended, soft, nontender, no guarding, rebound tenderness  NEURO: no focal motor or sensory deficits, normal speech, PERRLA, EOMI, AAOx3  MSK: 2+ pitting peripheral edema b/l up to groin, no calf tenderness b/l  SKIN: well-perfused, extremities warm, erythematous rash  PSYCH: appropriate mood and affect

## 2020-06-18 NOTE — CONSULT NOTE ADULT - ASSESSMENT
63 yo F with PMH of ESRD on HD (TTS), CAD s/p stents, CHF (EF 35%) with severe AS, T1DM, COPD, CVA with no residuals p/w acute onset SOB and LE pain. Pt was recently hospitalized for SOB persistently COVID+ during that visit now with sob and pneumonia      1- ESRD  2- CHF   3- CAD  4- HTN   5- pneumonia      O2   hd for fluid removal   hd consent obtained. witnessed and placed in chart  lisinopril 10 mg non hd days  rocephin 1 g qd   shpt renvela one tab tid with meals   nutrition consult for low sodium diet

## 2020-06-18 NOTE — ED PROVIDER NOTE - ATTENDING CONTRIBUTION TO CARE
I saw and evaluated patient with resident. I discussed H+P and MDM with resident. I agree with the statements made by the resident unless otherwise noted.    The care of this patient was in support of the [facility] countermeasures to Covid-19.

## 2020-06-18 NOTE — CONSULT NOTE ADULT - SUBJECTIVE AND OBJECTIVE BOX
[facility]  [phone]  NEPHROLOGY      INITIAL CONSULT NOTE  --------------------------------------------------------------------------------  HPI:      63 yo F with PMH of ESRD on HD (TTS), CAD s/p stents, CHF (EF 35%) with severe AS, T1DM, COPD, CVA with no residuals p/w acute onset SOB and LE pain. Pt was recently hospitalized for SOB last week, found to be persistently COVID+ during that visit. She returns now for SOB that was significantly worse today, unable to carry out usual activities; felt so bad, she skipped her scheduled HD session; endorsing some associated cough, denies wheezing or sputum production. Denies any associated chest pain or palpitations. Also c/o of acute on chronic b/l LE pain, L>R, non radiating; states she has this pain often, but it was worse today, improved with walking around, worse when laying down or with palpation. in er with pulm edema fluid overload renal consult called.     PAST HISTORY  --------------------------------------------------------------------------------  PAST MEDICAL & SURGICAL HISTORY:  COPD (chronic obstructive pulmonary disease)  Localized enlarged lymph nodes  CHF (congestive heart failure): EF 40-45%  Subclavian vein stenosis, left: s/p stent  DKA, type 1: 1/2015  ACS (acute coronary syndrome): 1/2015 - cath revealed 100% ostial stenosis not amenable to PCI - medical management  TIA (transient ischemic attack): x 2 - 8-9 years ago prior to ASD/VSD repair  CAD (coronary artery disease): s/p stents  Gout: past  CVA (cerebral infarction): with no residual, 8 yrs ago, prior to heart surgery - ST memory loss  Peripheral vascular disease: occluded left fem-pop bypass 5/2015  Diabetes mellitus type 1: Insulin Dependent -  ESRD (end stage renal disease): dialysis  M, tue, thursday, saturday  Hyperlipidemia  Status post device closure of ASD: &quot;bren[place]&quot;  History of cardiac catheterization: 1/2015 - no intervention  S/P femoral-popliteal bypass surgery: L and R in 2013 with graft; 5/2015 CFA angioplasty left and ileofemoral endarterectomywith vein patch angioplasty of left fem-pop bypass graft  Multiple vascular surgery both leg, left fempop bypass revision 11/2015  AV (arteriovenous fistula): Left AV graft; revision with stent placement 2-3 years ago  S/P cholecystectomy    FAMILY HISTORY:  Family history of smoking  Family history of hypertension  Family history of cancer (Sibling)    PAST SOCIAL HISTORY: past tobacco use      ALLERGIES & MEDICATIONS  --------------------------------------------------------------------------------  Allergies    No Known Allergies    Intolerances      Standing Inpatient Medications  aspirin enteric coated 81 milliGRAM(s) Oral daily  atorvastatin 20 milliGRAM(s) Oral at bedtime  azithromycin  IVPB 500 milliGRAM(s) IV Intermittent every 24 hours  buDESOnide    Inhalation Suspension 0.5 milliGRAM(s) Inhalation two times a day  cefTRIAXone   IVPB 1000 milliGRAM(s) IV Intermittent every 24 hours  clopidogrel Tablet 75 milliGRAM(s) Oral daily  dextrose 5%. 1000 milliLiter(s) IV Continuous <Continuous>  dextrose 50% Injectable 12.5 Gram(s) IV Push once  dextrose 50% Injectable 25 Gram(s) IV Push once  dextrose 50% Injectable 25 Gram(s) IV Push once  heparin   Injectable 5000 Unit(s) SubCutaneous every 8 hours  insulin glargine Injectable (LANTUS) 10 Unit(s) SubCutaneous at bedtime  insulin lispro (HumaLOG) corrective regimen sliding scale   SubCutaneous three times a day before meals  insulin lispro (HumaLOG) corrective regimen sliding scale   SubCutaneous at bedtime  insulin lispro Injectable (HumaLOG) 2 Unit(s) SubCutaneous three times a day before meals  lisinopril 10 milliGRAM(s) Oral daily  metoprolol succinate ER 50 milliGRAM(s) Oral daily  sevelamer carbonate 800 milliGRAM(s) Oral three times a day with meals    PRN Inpatient Medications  acetaminophen   Tablet .. 650 milliGRAM(s) Oral every 6 hours PRN  dextrose 40% Gel 15 Gram(s) Oral once PRN  glucagon  Injectable 1 milliGRAM(s) IntraMuscular once PRN      REVIEW OF SYSTEMS  --------------------------------------------------------------------------------  Gen: No  fevers/chills   Skin: No rashes  Head/Eyes/Ears/Mouth: No headache; Normal hearing;  No sinus pain/discomfort, sore throat  Respiratory: + dyspnea, + cough, - wheezing, hemoptysis  CV: No chest pain, orthopnea+ , no palp   GI: No abdominal pain, diarrhea, nausea, vomiting, melena  : No dysuria, + anuria no  hematuria  MSK: ; no back pain  Neuro: No dizziness/lightheadedness,   also with ++  edema     All other systems were reviewed and are negative, except as noted.    VITALS/PHYSICAL EXAM  --------------------------------------------------------------------------------  T(C): 36.5 (06-18-20 @ 17:14), Max: 36.8 (06-18-20 @ 16:44)  HR: 85 (06-18-20 @ 20:06) (70 - 85)  BP: 135/64 (06-18-20 @ 20:06) (135/64 - 137/79)  RR: 20 (06-18-20 @ 20:06) (18 - 22)  SpO2: 100% (06-18-20 @ 20:06) (96% - 100%)  Wt(kg): --  Height (cm): 162.56 (06-18-20 @ 16:44)  Weight (kg): 54.4 (06-18-20 @ 16:44)  BMI (kg/m2): 20.6 (06-18-20 @ 16:44)  BSA (m2): 1.57 (06-18-20 @ 16:44)      Physical Exam:  	Gen: on O2 with difficulty talking due to sob   	+ JVD  	Pulm: decrease bs +  rales+ wheezing  	CV: RRR, S1S2; no rub  	Back: No CVA tenderness  	Abd: +BS, soft, nontender/nondistended  	: No suprapubic tenderness  	UE: Warm, no cyanosis  no clubbing,  no edema; no asterixis  	LE: Warm, no cyanosis  no clubbing, 3+ LLE with 1-2- RLE  edema  	Neuro: alert and oriented. speech coherent   	Psych: Normal affect and mood  	Skin: Warm, no decrease skin turgor   	Vascular access:    LABS/STUDIES  --------------------------------------------------------------------------------              10.3   4.31  >-----------<  200      [06-18-20 @ 17:25]              33.3     137  |  93  |  34  ----------------------------<  136      [06-18-20 @ 17:25]  5.0   |  26  |  5.31        Ca     9.4     [06-18-20 @ 17:25]    TPro  6.6  /  Alb  4.1  /  TBili  0.3  /  DBili  x   /  AST  17  /  ALT  8   /  AlkPhos  228  [06-18-20 @ 17:25]    PT/INR: PT 13.0 , INR 1.13       [06-18-20 @ 17:25]  PTT: 33.4       [06-18-20 @ 17:25]      Creatinine Trend:  SCr 5.31 [06-18 @ 17:25]  SCr 5.50 [06-08 @ 08:55]  SCr 4.21 [06-07 @ 13:29]  SCr 5.29 [06-06 @ 10:02]    Urinalysis - [06-04-17 @ 08:24]      Color Yellow / Appearance Clear / SG 1.013 / pH 8.5      Gluc 1000 / Ketone Negative  / Bili Negative / Urobili Negative       Blood Negative / Protein >600 / Leuk Est Small / Nitrite Negative      RBC 3-5 / WBC 6-10 / Hyaline  / Gran  / Sq Epi  / Non Sq Epi OCC / Bacteria       Iron 67, TIBC 234, %sat 29      [12-04-19 @ 08:38]  Ferritin 1387      [05-06-20 @ 12:17]  PTH -- (Ca 8.5)      [05-04-20 @ 04:39]   1317  PTH -- (Ca 8.3)      [12-04-19 @ 08:38]   952  HbA1c 7.7      [04-08-20 @ 08:03]  TSH 1.78      [11-14-19 @ 09:15]  Lipid: chol 108, TG 76, HDL 57, LDL 36      [11-14-19 @ 09:16]    HBsAb <3.0      [06-07-20 @ 00:34]  HBsAb Nonreact      [06-07-20 @ 00:34]  HBsAg Nonreact      [06-07-20 @ 00:34]  HBcAb Nonreact      [06-07-20 @ 00:34]  HCV 0.15, Nonreact      [06-07-20 @ 00:34]    < from: Xray Chest 1 View AP/PA (06.18.20 @ 18:31) >    ******PRELIMINARY REPORT******    ******PRELIMINARY REPORT******          EXAM:  XR CHEST AP OR PA 1V                            PROCEDURE DATE:  06/18/2020      ******PRELIMINARY REPORT******    ******PRELIMINARY REPORT******              INTERPRETATION:  New hazy right lower lung opacity, compatible with pneumonia.      < end of copied text >

## 2020-06-19 LAB
A1C WITH ESTIMATED AVERAGE GLUCOSE RESULT: 7.3 % — HIGH (ref 4–5.6)
ESTIMATED AVERAGE GLUCOSE: 163 MG/DL — HIGH (ref 68–114)
FERRITIN SERPL-MCNC: 1255 NG/ML — HIGH (ref 15–150)
GLUCOSE BLDC GLUCOMTR-MCNC: 157 MG/DL — HIGH (ref 70–99)
GLUCOSE BLDC GLUCOMTR-MCNC: 168 MG/DL — HIGH (ref 70–99)
GLUCOSE BLDC GLUCOMTR-MCNC: 190 MG/DL — HIGH (ref 70–99)
GLUCOSE BLDC GLUCOMTR-MCNC: 196 MG/DL — HIGH (ref 70–99)
GLUCOSE BLDC GLUCOMTR-MCNC: 200 MG/DL — HIGH (ref 70–99)

## 2020-06-19 PROCEDURE — 99223 1ST HOSP IP/OBS HIGH 75: CPT

## 2020-06-19 PROCEDURE — 71275 CT ANGIOGRAPHY CHEST: CPT | Mod: 26

## 2020-06-19 RX ORDER — OXYCODONE HYDROCHLORIDE 5 MG/1
5 TABLET ORAL ONCE
Refills: 0 | Status: DISCONTINUED | OUTPATIENT
Start: 2020-06-19 | End: 2020-06-19

## 2020-06-19 RX ORDER — BUDESONIDE, MICRONIZED 100 %
2 POWDER (GRAM) MISCELLANEOUS
Refills: 0 | Status: DISCONTINUED | OUTPATIENT
Start: 2020-06-19 | End: 2020-06-23

## 2020-06-19 RX ORDER — OXYCODONE AND ACETAMINOPHEN 5; 325 MG/1; MG/1
1 TABLET ORAL ONCE
Refills: 0 | Status: DISCONTINUED | OUTPATIENT
Start: 2020-06-19 | End: 2020-06-19

## 2020-06-19 RX ADMIN — Medication 1: at 12:22

## 2020-06-19 RX ADMIN — Medication 2 UNIT(S): at 12:23

## 2020-06-19 RX ADMIN — CEFTRIAXONE 100 MILLIGRAM(S): 500 INJECTION, POWDER, FOR SOLUTION INTRAMUSCULAR; INTRAVENOUS at 21:32

## 2020-06-19 RX ADMIN — INSULIN GLARGINE 10 UNIT(S): 100 INJECTION, SOLUTION SUBCUTANEOUS at 21:30

## 2020-06-19 RX ADMIN — SEVELAMER CARBONATE 800 MILLIGRAM(S): 2400 POWDER, FOR SUSPENSION ORAL at 12:22

## 2020-06-19 RX ADMIN — Medication 2 UNIT(S): at 08:15

## 2020-06-19 RX ADMIN — Medication 50 MILLIGRAM(S): at 05:47

## 2020-06-19 RX ADMIN — Medication 2 PUFF(S): at 16:41

## 2020-06-19 RX ADMIN — Medication 81 MILLIGRAM(S): at 12:22

## 2020-06-19 RX ADMIN — HEPARIN SODIUM 5000 UNIT(S): 5000 INJECTION INTRAVENOUS; SUBCUTANEOUS at 15:37

## 2020-06-19 RX ADMIN — OXYCODONE AND ACETAMINOPHEN 1 TABLET(S): 5; 325 TABLET ORAL at 15:37

## 2020-06-19 RX ADMIN — OXYCODONE AND ACETAMINOPHEN 1 TABLET(S): 5; 325 TABLET ORAL at 06:07

## 2020-06-19 RX ADMIN — CLOPIDOGREL BISULFATE 75 MILLIGRAM(S): 75 TABLET, FILM COATED ORAL at 12:22

## 2020-06-19 RX ADMIN — Medication 2 UNIT(S): at 16:39

## 2020-06-19 RX ADMIN — Medication 650 MILLIGRAM(S): at 22:43

## 2020-06-19 RX ADMIN — SEVELAMER CARBONATE 800 MILLIGRAM(S): 2400 POWDER, FOR SUSPENSION ORAL at 16:38

## 2020-06-19 RX ADMIN — Medication 1: at 16:38

## 2020-06-19 RX ADMIN — Medication 2 PUFF(S): at 08:29

## 2020-06-19 RX ADMIN — HEPARIN SODIUM 5000 UNIT(S): 5000 INJECTION INTRAVENOUS; SUBCUTANEOUS at 05:47

## 2020-06-19 RX ADMIN — OXYCODONE HYDROCHLORIDE 5 MILLIGRAM(S): 5 TABLET ORAL at 23:35

## 2020-06-19 RX ADMIN — AZITHROMYCIN 250 MILLIGRAM(S): 500 TABLET, FILM COATED ORAL at 21:31

## 2020-06-19 RX ADMIN — Medication 1: at 08:15

## 2020-06-19 RX ADMIN — ATORVASTATIN CALCIUM 20 MILLIGRAM(S): 80 TABLET, FILM COATED ORAL at 21:31

## 2020-06-19 RX ADMIN — HEPARIN SODIUM 5000 UNIT(S): 5000 INJECTION INTRAVENOUS; SUBCUTANEOUS at 21:31

## 2020-06-19 RX ADMIN — LISINOPRIL 10 MILLIGRAM(S): 2.5 TABLET ORAL at 05:47

## 2020-06-19 RX ADMIN — SEVELAMER CARBONATE 800 MILLIGRAM(S): 2400 POWDER, FOR SUSPENSION ORAL at 08:14

## 2020-06-19 NOTE — PROGRESS NOTE ADULT - ASSESSMENT
63 yo F with PMH of ESRD on HD (TTS), CAD s/p stents, CHF (EF 35%) with severe AS, T1DM, COPD, CVA with no residuals p/w acute onset SOB and LE pain. Pt was recently hospitalized for SOB persistently COVID+ during that visit now with sob and pneumonia      1- ESRD  2- CHF   3- CAD  4- HTN   5- pneumonia      O2   hd for fluid removal  in am   lisinopril 10 mg non hd days  rocephin 1 g qd   shpt renvela one tab tid with meals   nutrition consult for low sodium diet

## 2020-06-19 NOTE — PROGRESS NOTE ADULT - SUBJECTIVE AND OBJECTIVE BOX
[facility]   [phone]  RENAL FOLLOW UP NOTE  --------------------------------------------------------------------------------  Chief Complaint:    24 hour events/subjective:    seen earlier.   states breathing is slightly better    PAST HISTORY  --------------------------------------------------------------------------------  No significant changes to PMH, PSH, FHx, SHx, unless otherwise noted    ALLERGIES & MEDICATIONS  --------------------------------------------------------------------------------  Allergies    No Known Allergies    Intolerances      Standing Inpatient Medications  aspirin enteric coated 81 milliGRAM(s) Oral daily  atorvastatin 20 milliGRAM(s) Oral at bedtime  azithromycin  IVPB 500 milliGRAM(s) IV Intermittent every 24 hours  buDESOnide   90 MICROgram(s) Inhaler 2 Puff(s) Inhalation two times a day  cefTRIAXone   IVPB 1000 milliGRAM(s) IV Intermittent every 24 hours  clopidogrel Tablet 75 milliGRAM(s) Oral daily  dextrose 5%. 1000 milliLiter(s) IV Continuous <Continuous>  dextrose 50% Injectable 12.5 Gram(s) IV Push once  dextrose 50% Injectable 25 Gram(s) IV Push once  dextrose 50% Injectable 25 Gram(s) IV Push once  heparin   Injectable 5000 Unit(s) SubCutaneous every 8 hours  insulin glargine Injectable (LANTUS) 10 Unit(s) SubCutaneous at bedtime  insulin lispro (HumaLOG) corrective regimen sliding scale   SubCutaneous three times a day before meals  insulin lispro (HumaLOG) corrective regimen sliding scale   SubCutaneous at bedtime  insulin lispro Injectable (HumaLOG) 2 Unit(s) SubCutaneous three times a day before meals  lisinopril 10 milliGRAM(s) Oral daily  metoprolol succinate ER 50 milliGRAM(s) Oral daily  sevelamer carbonate 800 milliGRAM(s) Oral three times a day with meals    PRN Inpatient Medications  acetaminophen   Tablet .. 650 milliGRAM(s) Oral every 6 hours PRN  dextrose 40% Gel 15 Gram(s) Oral once PRN  glucagon  Injectable 1 milliGRAM(s) IntraMuscular once PRN      REVIEW OF SYSTEMS  --------------------------------------------------------------------------------    Gen: denies fevers/chills,  CVS: denies chest pain/palpitations  Resp:  + SOB/Cough  GI: Denies N/V/Abd pain  : Denies dysuria    All other systems were reviewed and are negative, except as noted.    VITALS/PHYSICAL EXAM  --------------------------------------------------------------------------------  T(C): 36.3 (06-19-20 @ 19:17), Max: 36.8 (06-19-20 @ 02:30)  HR: 73 (06-19-20 @ 19:17) (70 - 87)  BP: 136/76 (06-19-20 @ 19:17) (136/70 - 150/77)  RR: 10 (06-19-20 @ 19:17) (10 - 18)  SpO2: 100% (06-19-20 @ 19:17) (96% - 100%)  Wt(kg): --  Height (cm): 162.56 (06-18-20 @ 16:44)  Weight (kg): 54.4 (06-18-20 @ 16:44)  BMI (kg/m2): 20.6 (06-18-20 @ 16:44)  BSA (m2): 1.57 (06-18-20 @ 16:44)      06-19-20 @ 07:01  -  06-19-20 @ 22:07  --------------------------------------------------------  IN: 880 mL / OUT: 0 mL / NET: 880 mL      Physical Exam:  	    Gen: on O2  	+ JVD  	Pulm: decrease bs +  rales -  wheezing  	CV: RRR, S1S2; no rub  	Abd: +BS, soft, nontender/nondistended  	: No suprapubic tenderness  	UE: Warm, no cyanosis  no clubbing,  no edema; no asterixis  	LE: Warm, no cyanosis  no clubbing, 3+ LLE with 1-2- RLE  edema      LABS/STUDIES  --------------------------------------------------------------------------------              10.3   4.31  >-----------<  200      [06-18-20 @ 17:25]              33.3     137  |  93  |  34  ----------------------------<  136      [06-18-20 @ 17:25]  5.0   |  26  |  5.31        Ca     9.4     [06-18-20 @ 17:25]    TPro  6.6  /  Alb  4.1  /  TBili  0.3  /  DBili  x   /  AST  17  /  ALT  8   /  AlkPhos  228  [06-18-20 @ 17:25]    PT/INR: PT 13.0 , INR 1.13       [06-18-20 @ 17:25]  PTT: 33.4       [06-18-20 @ 17:25]      Creatinine Trend:  SCr 5.31 [06-18 @ 17:25]  SCr 5.50 [06-08 @ 08:55]  SCr 4.21 [06-07 @ 13:29]  SCr 5.29 [06-06 @ 10:02]                  Iron 67, TIBC 234, %sat 29      [12-04-19 @ 08:38]  Ferritin 1255      [06-18-20 @ 23:05]  PTH -- (Ca 8.5)      [05-04-20 @ 04:39]   1317  PTH -- (Ca 8.3)      [12-04-19 @ 08:38]   952  HbA1c 7.7      [04-08-20 @ 08:03]  TSH 1.78      [11-14-19 @ 09:15]  Lipid: chol 108, TG 76, HDL 57, LDL 36      [11-14-19 @ 09:16]

## 2020-06-19 NOTE — PROGRESS NOTE ADULT - SUBJECTIVE AND OBJECTIVE BOX
Cardiovascular Disease Progress Note    Overnight events: No acute events overnight.  admitted for sob and edema. slept in chair. feels okay at present  Otherwise review of systems negative    Objective Findings:  T(C): 36.6 (20 @ 05:45), Max: 36.8 (20 @ 16:44)  HR: 82 (20 @ 05:45) (60 - 87)  BP: 140/62 (20 @ 05:45) (123/55 - 150/77)  RR: 18 (20 @ 05:45) (16 - 22)  SpO2: 98% (20 @ 05:45) (96% - 100%)  Wt(kg): --  Daily Height in cm: 162.56 (2020 16:44)    Daily Weight in k.6 (2020 01:50)      Physical Exam:  Gen: NAD  HEENT: EOMI  CV: RRR, normal S1 + S2, no m/r/g  Lungs: CTAB  Abd: soft, non-tender  Ext: + edema        Laboratory Data:                        10.3   4.31  )-----------( 200      ( 2020 17:25 )             33.3         137  |  93<L>  |  34<H>  ----------------------------<  136<H>  5.0   |  26  |  5.31<H>    Ca    9.4      2020 17:25    TPro  6.6  /  Alb  4.1  /  TBili  0.3  /  DBili  x   /  AST  17  /  ALT  8<L>  /  AlkPhos  228<H>      PT/INR - ( 2020 17:25 )   PT: 13.0 sec;   INR: 1.13 ratio         PTT - ( 2020 17:25 )  PTT:33.4 sec          Inpatient Medications:  MEDICATIONS  (STANDING):  aspirin enteric coated 81 milliGRAM(s) Oral daily  atorvastatin 20 milliGRAM(s) Oral at bedtime  azithromycin  IVPB 500 milliGRAM(s) IV Intermittent every 24 hours  buDESOnide   90 MICROgram(s) Inhaler 2 Puff(s) Inhalation two times a day  cefTRIAXone   IVPB 1000 milliGRAM(s) IV Intermittent every 24 hours  clopidogrel Tablet 75 milliGRAM(s) Oral daily  dextrose 5%. 1000 milliLiter(s) (50 mL/Hr) IV Continuous <Continuous>  dextrose 50% Injectable 12.5 Gram(s) IV Push once  dextrose 50% Injectable 25 Gram(s) IV Push once  dextrose 50% Injectable 25 Gram(s) IV Push once  heparin   Injectable 5000 Unit(s) SubCutaneous every 8 hours  insulin glargine Injectable (LANTUS) 10 Unit(s) SubCutaneous at bedtime  insulin lispro (HumaLOG) corrective regimen sliding scale   SubCutaneous three times a day before meals  insulin lispro (HumaLOG) corrective regimen sliding scale   SubCutaneous at bedtime  insulin lispro Injectable (HumaLOG) 2 Unit(s) SubCutaneous three times a day before meals  lisinopril 10 milliGRAM(s) Oral daily  metoprolol succinate ER 50 milliGRAM(s) Oral daily  sevelamer carbonate 800 milliGRAM(s) Oral three times a day with meals      Assessment:  -esrhd on hd  -ischemic cardiomyopathy c/b mod to severe LV dysfunction, cad s/p multiple stents  -severe PAD s/p prior interventions  -s/p novel coronavirus/sars-cov2 infection 20  -copd       Recs:  -optimization of vol status with hd  -c/w metop and lisinopril for gdmt for lv dysfunction; NSVT and pAT  -cw dapt and statin for cad/stents, pad  -dvt ppx        Over 25 minutes spent on total encounter; more than 50% of the visit was spent counseling and/or coordinating care by the attending physician.      Julián Biswas MD   Cardiovascular Disease  [phone]

## 2020-06-19 NOTE — CONSULT NOTE ADULT - SUBJECTIVE AND OBJECTIVE BOX
PULMONARY CONSULT NOTE      NATHAN MEEKS  MRN-[id number]    Patient is a 64y old  Female who presents with a chief complaint of SOB, LE pain (19 Jun 2020 07:59)      HISTORY OF PRESENT ILLNESS:   65 y/o F w/ h/o ESRD on HD (T/Th/S), HTN, T2DM, COVID+ 2 wks ago also with CAD  s/p multiple stents/brachytherapy,   CHF (EF 30% per last [facility]), mod MR, severe AS,  TIA, severe PVD s/p b/l fempop bypass, left subclavian vein stenosis s/p stent, hx E bus and mediastinoscopy cardiothoracic surgery to rule out lymphoma, rule out lung cancer with non diagnostic, COPD not on medications, admitted for dyspnea  seen by cardio and renal  today on 2L- comfortable. denies dyspnea    Allergies    No Known Allergies    Intolerances        PAST MEDICAL & SURGICAL HISTORY:  COPD (chronic obstructive pulmonary disease)  Localized enlarged lymph nodes  CHF (congestive heart failure): EF 40-45%  Subclavian vein stenosis, left: s/p stent  DKA, type 1: 1/2015  ACS (acute coronary syndrome): 1/2015 - cath revealed 100% ostial stenosis not amenable to PCI - medical management  TIA (transient ischemic attack): x 2 - 8-9 years ago prior to ASD/VSD repair  CAD (coronary artery disease): s/p stents  Gout: past  CVA (cerebral infarction): with no residual, 8 yrs ago, prior to heart surgery - ST memory loss  Peripheral vascular disease: occluded left fem-pop bypass 5/2015  Diabetes mellitus type 1: Insulin Dependent -  ESRD (end stage renal disease): dialysis  M, tue, thursday, saturday  Hyperlipidemia  Status post device closure of ASD: &quot;clamshell&quot;  History of cardiac catheterization: 1/2015 - no intervention  S/P femoral-popliteal bypass surgery: L and R in 2013 with graft; 5/2015 CFA angioplasty left and ileofemoral endarterectomywith vein patch angioplasty of left fem-pop bypass graft  Multiple vascular surgery both leg, left fempop bypass revision 11/2015  AV (arteriovenous fistula): Left AV graft; revision with stent placement 2-3 years ago  S/P cholecystectomy          FAMILY HISTORY:  Family history of smoking  Family history of hypertension  Family history of cancer (Sibling)    Prescriptions:  aspirin 81 mg oral delayed release tablet: 1 tab(s) orally once a day (18 Jun 2020 20:41)  budesonide 0.5 mg/2 mL inhalation suspension: 2 milliliter(s) inhaled 2 times a day, As Needed (18 Jun 2020 20:41)  lisinopril 10 mg oral tablet: 1 tab(s) orally once a day  (18 Jun 2020 20:41)      SOCIAL HISTORY  Smoking History: quit in 2001    REVIEW OF SYSTEMS:    CONSTITUTIONAL:  No fevers, chills, sweats    HEENT:  Eyes:  No diplopia or blurred vision. ENT:  No earache, sore throat or runny nose.    CARDIOVASCULAR:  No pressure, squeezing, tightness, or heaviness about the chest; no palpitations.    RESPIRATORY:  Per HPI    GASTROINTESTINAL:  No abdominal pain, nausea, vomiting or diarrhea.    GENITOURINARY:  No dysuria, frequency or urgency.    NEUROLOGIC: weakness       Vital Signs Last 24 Hrs  T(C): 36.8 (19 Jun 2020 14:10), Max: 36.8 (18 Jun 2020 16:44)  T(F): 98.2 (19 Jun 2020 14:10), Max: 98.3 (18 Jun 2020 16:44)  HR: 70 (19 Jun 2020 14:10) (60 - 87)  BP: 136/70 (19 Jun 2020 14:10) (123/55 - 150/77)  BP(mean): --  RR: 17 (19 Jun 2020 14:10) (16 - 22)  SpO2: 96% (19 Jun 2020 14:10) (96% - 100%)    PHYSICAL EXAMINATION:    GENERAL: The patient is a well-developed, well-nourished female in no apparent distress.     HEENT: Head is normocephalic and atraumatic.           LUNGS:  Respirations unlabored       NEUROLOGIC: Grossly intact    limited PE due to covid    MEDICATIONS  (STANDING):  aspirin enteric coated 81 milliGRAM(s) Oral daily  atorvastatin 20 milliGRAM(s) Oral at bedtime  azithromycin  IVPB 500 milliGRAM(s) IV Intermittent every 24 hours  buDESOnide   90 MICROgram(s) Inhaler 2 Puff(s) Inhalation two times a day  cefTRIAXone   IVPB 1000 milliGRAM(s) IV Intermittent every 24 hours  clopidogrel Tablet 75 milliGRAM(s) Oral daily  dextrose 5%. 1000 milliLiter(s) (50 mL/Hr) IV Continuous <Continuous>  dextrose 50% Injectable 12.5 Gram(s) IV Push once  dextrose 50% Injectable 25 Gram(s) IV Push once  dextrose 50% Injectable 25 Gram(s) IV Push once  heparin   Injectable 5000 Unit(s) SubCutaneous every 8 hours  insulin glargine Injectable (LANTUS) 10 Unit(s) SubCutaneous at bedtime  insulin lispro (HumaLOG) corrective regimen sliding scale   SubCutaneous three times a day before meals  insulin lispro (HumaLOG) corrective regimen sliding scale   SubCutaneous at bedtime  insulin lispro Injectable (HumaLOG) 2 Unit(s) SubCutaneous three times a day before meals  lisinopril 10 milliGRAM(s) Oral daily  metoprolol succinate ER 50 milliGRAM(s) Oral daily  sevelamer carbonate 800 milliGRAM(s) Oral three times a day with meals      MEDICATIONS  (PRN):  acetaminophen   Tablet .. 650 milliGRAM(s) Oral every 6 hours PRN Temp greater or equal to 38C (100.4F), Mild Pain (1 - 3), Moderate Pain (4 - 6)  dextrose 40% Gel 15 Gram(s) Oral once PRN Blood Glucose LESS THAN 70 milliGRAM(s)/deciliter  glucagon  Injectable 1 milliGRAM(s) IntraMuscular once PRN Glucose LESS THAN 70 milligrams/deciliter        LABS:   CBC Full  -  ( 18 Jun 2020 17:25 )  WBC Count : 4.31 K/uL  RBC Count : 3.01 M/uL  Hemoglobin : 10.3 g/dL  Hematocrit : 33.3 %  Platelet Count - Automated : 200 K/uL  Mean Cell Volume : 110.6 fl  Mean Cell Hemoglobin : 34.2 pg  Mean Cell Hemoglobin Concentration : 30.9 gm/dL  Auto Neutrophil # : 3.03 K/uL  Auto Lymphocyte # : 0.19 K/uL  Auto Monocyte # : 0.53 K/uL  Auto Eosinophil # : 0.11 K/uL  Auto Basophil # : 0.00 K/uL  Auto Neutrophil % : 69.3 %  Auto Lymphocyte % : 4.4 %  Auto Monocyte % : 12.3 %  Auto Eosinophil % : 2.6 %  Auto Basophil % : 0.0 %    PT/INR - ( 18 Jun 2020 17:25 )   PT: 13.0 sec;   INR: 1.13 ratio         PTT - ( 18 Jun 2020 17:25 )  PTT:33.4 sec  06-18    137  |  93<L>  |  34<H>  ----------------------------<  136<H>  5.0   |  26  |  5.31<H>    Ca    9.4      18 Jun 2020 17:25    TPro  6.6  /  Alb  4.1  /  TBili  0.3  /  DBili  x   /  AST  17  /  ALT  8<L>  /  AlkPhos  228<H>  06-18     COVID-19 PCR: NotDetec (18 Jun 2020 17:32)        RADIOLOGY & ADDITIONAL STUDIES:  < from: Xray Chest 1 View AP/PA (06.18.20 @ 18:31) >    EXAM:  XR CHEST AP OR PA 1V                            PROCEDURE DATE:  06/18/2020            INTERPRETATION:    CLINICAL INDICATION: Shortness of breath. Covid positive.    TECHNIQUE: Portable frontal view of the chest.    COMPARISON: Frontal chest radiograph from 6/6/2020.    FINDINGS:     Left vascular stents.  Cardiac silhouette is within normal limits.  Hazy right lower lung opacity, new from prior study.  No pleural effusions or pneumothorax.  No acute osseous abnormality.      IMPRESSION:    New hazy right lower lung opacity, compatible with pneumonia.        < end of copied text >    ECHO:  < from: Transthoracic Echocardiogram (11.14.19 @ 07:52) >  flow by color doppler.  ------------------------------------------------------------------------  Conclusions:  1. Mitral annular calcification. Moderate to severe mitral  regurgitation (severity may be underestimated).  Mild-moderate mitral stenosis (mean gradient = 4 mm Hg).  2. Calcified trileaflet aortic valve with decreased  opening. Peak transaortic valve gradient equals 30 mm Hg,  mean transaortic valve gradient equals 20 mm Hg, estimated  aortic valve area equals 0.7 sqcm (by continuity equation),  aortic valve velocity time integral equals 70 cm,  consistent with severe aortic stenosis. Mild aortic  regurgitation.  3. Normal left ventricular internal dimensions and wall  thicknesses.  4. Moderate segmental left ventricular systolic  dysfunction. The inferior and inferolateral walls are  hypocontractile.  Flattening of the interventricular septum  in both systole is consistent with right ventricular volume  overload.  5. Increased E/e'  is consistent with elevated left  ventricular filling pressure.  6. Normal right ventricular size with decreased right  ventricular systolic function.  7. Moderate tricuspid regurgitation.  8. An atrial septal closure deviceis seen on the  interatrial septum and appears well-seated with no residual  interatrial flow by color doppler.  *** Compared with echocardiogram of 4/2/2019, no  significant changes noted.  ------------------------------------------------------------------------  Confirmed on  11/14/2019 - 15:38:12 by Hermelindo Jones M.D.  ------------------------------------------------------------------------    < end of copied text >    ASSESSMENT:  65 yo with multiple meidcal problems admitted for dyspnea    PLAN:  HD as scheduled  f/uc ardio  abx per ID  f/u cultures  nebs PRN      Thank you for allowing me to participate in the care of this patient.  Please feel free to call me for any questions/concerns.      Chelle Kapoor DO  [facility] Pulmonary/Sleep Medicine  [phone]

## 2020-06-19 NOTE — PROGRESS NOTE ADULT - SUBJECTIVE AND OBJECTIVE BOX
Patient is a 64y old  Female who presents with a chief complaint of SOB, LE pain (19 Jun 2020 14:22)      SUBJECTIVE / OVERNIGHT EVENTS: feels better  Review of Systems  chest pain no  palpitations no  sob no  nausea no  headache no    MEDICATIONS  (STANDING):  aspirin enteric coated 81 milliGRAM(s) Oral daily  atorvastatin 20 milliGRAM(s) Oral at bedtime  azithromycin  IVPB 500 milliGRAM(s) IV Intermittent every 24 hours  buDESOnide   90 MICROgram(s) Inhaler 2 Puff(s) Inhalation two times a day  cefTRIAXone   IVPB 1000 milliGRAM(s) IV Intermittent every 24 hours  clopidogrel Tablet 75 milliGRAM(s) Oral daily  dextrose 5%. 1000 milliLiter(s) (50 mL/Hr) IV Continuous <Continuous>  dextrose 50% Injectable 12.5 Gram(s) IV Push once  dextrose 50% Injectable 25 Gram(s) IV Push once  dextrose 50% Injectable 25 Gram(s) IV Push once  heparin   Injectable 5000 Unit(s) SubCutaneous every 8 hours  insulin glargine Injectable (LANTUS) 10 Unit(s) SubCutaneous at bedtime  insulin lispro (HumaLOG) corrective regimen sliding scale   SubCutaneous three times a day before meals  insulin lispro (HumaLOG) corrective regimen sliding scale   SubCutaneous at bedtime  insulin lispro Injectable (HumaLOG) 2 Unit(s) SubCutaneous three times a day before meals  lisinopril 10 milliGRAM(s) Oral daily  metoprolol succinate ER 50 milliGRAM(s) Oral daily  sevelamer carbonate 800 milliGRAM(s) Oral three times a day with meals    MEDICATIONS  (PRN):  acetaminophen   Tablet .. 650 milliGRAM(s) Oral every 6 hours PRN Temp greater or equal to 38C (100.4F), Mild Pain (1 - 3), Moderate Pain (4 - 6)  dextrose 40% Gel 15 Gram(s) Oral once PRN Blood Glucose LESS THAN 70 milliGRAM(s)/deciliter  glucagon  Injectable 1 milliGRAM(s) IntraMuscular once PRN Glucose LESS THAN 70 milligrams/deciliter      Vital Signs Last 24 Hrs  T(C): 36.3 (19 Jun 2020 19:17), Max: 36.8 (19 Jun 2020 02:30)  T(F): 97.4 (19 Jun 2020 19:17), Max: 98.3 (19 Jun 2020 02:30)  HR: 73 (19 Jun 2020 19:17) (60 - 87)  BP: 136/76 (19 Jun 2020 19:17) (123/55 - 150/77)  BP(mean): --  RR: 10 (19 Jun 2020 19:17) (10 - 20)  SpO2: 100% (19 Jun 2020 19:17) (96% - 100%)    PHYSICAL EXAM:  GENERAL: NAD, well-developed  HEAD:  Atraumatic, Normocephalic  EYES: EOMI, PERRLA, conjunctiva and sclera clear  NECK: Supple, No JVD  CHEST/LUNG: Clear to auscultation bilaterally; No wheeze  HEART: Regular rate and rhythm; No murmurs, rubs, or gallops  ABDOMEN: Soft, Nontender, Nondistended; Bowel sounds present  EXTREMITIES:  2+ Peripheral Pulses, No clubbing, cyanosis, or edema  PSYCH: AAOx3  NEUROLOGY: non-focal  SKIN: No rashes or lesions    LABS:                        10.3   4.31  )-----------( 200      ( 18 Jun 2020 17:25 )             33.3     06-18    137  |  93<L>  |  34<H>  ----------------------------<  136<H>  5.0   |  26  |  5.31<H>    Ca    9.4      18 Jun 2020 17:25    TPro  6.6  /  Alb  4.1  /  TBili  0.3  /  DBili  x   /  AST  17  /  ALT  8<L>  /  AlkPhos  228<H>  06-18    PT/INR - ( 18 Jun 2020 17:25 )   PT: 13.0 sec;   INR: 1.13 ratio         PTT - ( 18 Jun 2020 17:25 )  PTT:33.4 sec            RADIOLOGY & ADDITIONAL TESTS:    Imaging Personally Reviewed:    Consultant(s) Notes Reviewed:      Care Discussed with Consultants/Other Providers:

## 2020-06-20 DIAGNOSIS — R06.02 SHORTNESS OF BREATH: ICD-10-CM

## 2020-06-20 LAB
ANION GAP SERPL CALC-SCNC: 16 MMOL/L — SIGNIFICANT CHANGE UP (ref 5–17)
BUN SERPL-MCNC: 28 MG/DL — HIGH (ref 7–23)
CALCIUM SERPL-MCNC: 8.7 MG/DL — SIGNIFICANT CHANGE UP (ref 8.4–10.5)
CHLORIDE SERPL-SCNC: 90 MMOL/L — LOW (ref 96–108)
CO2 SERPL-SCNC: 24 MMOL/L — SIGNIFICANT CHANGE UP (ref 22–31)
CREAT SERPL-MCNC: 4.93 MG/DL — HIGH (ref 0.5–1.3)
GLUCOSE BLDC GLUCOMTR-MCNC: 160 MG/DL — HIGH (ref 70–99)
GLUCOSE BLDC GLUCOMTR-MCNC: 332 MG/DL — HIGH (ref 70–99)
GLUCOSE BLDC GLUCOMTR-MCNC: 411 MG/DL — HIGH (ref 70–99)
GLUCOSE BLDC GLUCOMTR-MCNC: 437 MG/DL — HIGH (ref 70–99)
GLUCOSE SERPL-MCNC: 300 MG/DL — HIGH (ref 70–99)
HCT VFR BLD CALC: 31.7 % — LOW (ref 34.5–45)
HGB BLD-MCNC: 9.8 G/DL — LOW (ref 11.5–15.5)
MCHC RBC-ENTMCNC: 30.9 GM/DL — LOW (ref 32–36)
MCHC RBC-ENTMCNC: 34.5 PG — HIGH (ref 27–34)
MCV RBC AUTO: 111.6 FL — HIGH (ref 80–100)
NRBC # BLD: 0 /100 WBCS — SIGNIFICANT CHANGE UP (ref 0–0)
PLATELET # BLD AUTO: 182 K/UL — SIGNIFICANT CHANGE UP (ref 150–400)
POTASSIUM SERPL-MCNC: 5 MMOL/L — SIGNIFICANT CHANGE UP (ref 3.5–5.3)
POTASSIUM SERPL-SCNC: 5 MMOL/L — SIGNIFICANT CHANGE UP (ref 3.5–5.3)
RBC # BLD: 2.84 M/UL — LOW (ref 3.8–5.2)
RBC # FLD: 14.6 % — HIGH (ref 10.3–14.5)
SODIUM SERPL-SCNC: 130 MMOL/L — LOW (ref 135–145)
WBC # BLD: 4.24 K/UL — SIGNIFICANT CHANGE UP (ref 3.8–10.5)
WBC # FLD AUTO: 4.24 K/UL — SIGNIFICANT CHANGE UP (ref 3.8–10.5)

## 2020-06-20 PROCEDURE — 99232 SBSQ HOSP IP/OBS MODERATE 35: CPT

## 2020-06-20 RX ORDER — ACETAMINOPHEN 500 MG
1000 TABLET ORAL ONCE
Refills: 0 | Status: COMPLETED | OUTPATIENT
Start: 2020-06-20 | End: 2020-06-20

## 2020-06-20 RX ORDER — INSULIN LISPRO 100/ML
6 VIAL (ML) SUBCUTANEOUS ONCE
Refills: 0 | Status: COMPLETED | OUTPATIENT
Start: 2020-06-20 | End: 2020-06-20

## 2020-06-20 RX ORDER — OXYCODONE HYDROCHLORIDE 5 MG/1
5 TABLET ORAL ONCE
Refills: 0 | Status: DISCONTINUED | OUTPATIENT
Start: 2020-06-20 | End: 2020-06-20

## 2020-06-20 RX ORDER — OXYCODONE AND ACETAMINOPHEN 5; 325 MG/1; MG/1
1 TABLET ORAL ONCE
Refills: 0 | Status: DISCONTINUED | OUTPATIENT
Start: 2020-06-20 | End: 2020-06-20

## 2020-06-20 RX ORDER — ACETAMINOPHEN 500 MG
500 TABLET ORAL ONCE
Refills: 0 | Status: DISCONTINUED | OUTPATIENT
Start: 2020-06-20 | End: 2020-06-20

## 2020-06-20 RX ADMIN — Medication 81 MILLIGRAM(S): at 16:47

## 2020-06-20 RX ADMIN — INSULIN GLARGINE 10 UNIT(S): 100 INJECTION, SOLUTION SUBCUTANEOUS at 22:19

## 2020-06-20 RX ADMIN — CEFTRIAXONE 100 MILLIGRAM(S): 500 INJECTION, POWDER, FOR SOLUTION INTRAMUSCULAR; INTRAVENOUS at 22:55

## 2020-06-20 RX ADMIN — OXYCODONE AND ACETAMINOPHEN 1 TABLET(S): 5; 325 TABLET ORAL at 22:20

## 2020-06-20 RX ADMIN — SEVELAMER CARBONATE 800 MILLIGRAM(S): 2400 POWDER, FOR SUSPENSION ORAL at 08:03

## 2020-06-20 RX ADMIN — Medication 2 PUFF(S): at 06:30

## 2020-06-20 RX ADMIN — Medication 2 UNIT(S): at 08:02

## 2020-06-20 RX ADMIN — Medication 400 MILLIGRAM(S): at 08:03

## 2020-06-20 RX ADMIN — HEPARIN SODIUM 5000 UNIT(S): 5000 INJECTION INTRAVENOUS; SUBCUTANEOUS at 06:13

## 2020-06-20 RX ADMIN — ATORVASTATIN CALCIUM 20 MILLIGRAM(S): 80 TABLET, FILM COATED ORAL at 22:21

## 2020-06-20 RX ADMIN — SEVELAMER CARBONATE 800 MILLIGRAM(S): 2400 POWDER, FOR SUSPENSION ORAL at 16:47

## 2020-06-20 RX ADMIN — Medication 2 PUFF(S): at 16:50

## 2020-06-20 RX ADMIN — Medication 50 MILLIGRAM(S): at 06:13

## 2020-06-20 RX ADMIN — CLOPIDOGREL BISULFATE 75 MILLIGRAM(S): 75 TABLET, FILM COATED ORAL at 16:47

## 2020-06-20 RX ADMIN — Medication 4: at 08:06

## 2020-06-20 RX ADMIN — Medication 2 UNIT(S): at 16:48

## 2020-06-20 RX ADMIN — Medication 6 UNIT(S): at 22:18

## 2020-06-20 RX ADMIN — Medication 1: at 16:48

## 2020-06-20 RX ADMIN — AZITHROMYCIN 250 MILLIGRAM(S): 500 TABLET, FILM COATED ORAL at 23:33

## 2020-06-20 RX ADMIN — OXYCODONE HYDROCHLORIDE 5 MILLIGRAM(S): 5 TABLET ORAL at 12:18

## 2020-06-20 RX ADMIN — LISINOPRIL 10 MILLIGRAM(S): 2.5 TABLET ORAL at 06:13

## 2020-06-20 NOTE — PROGRESS NOTE ADULT - SUBJECTIVE AND OBJECTIVE BOX
PULMONARY PROGRESS NOTE    NATHAN MEEKS  MRN-[id number]    Patient is a 64y old  Female who presents with a chief complaint of SOB, LE pain (20 Jun 2020 10:35)      HPI:   leg pain  no resp complaints    ROS:   -    ACTIVE MEDICATION LIST:  MEDICATIONS  (STANDING):  aspirin enteric coated 81 milliGRAM(s) Oral daily  atorvastatin 20 milliGRAM(s) Oral at bedtime  azithromycin  IVPB 500 milliGRAM(s) IV Intermittent every 24 hours  buDESOnide   90 MICROgram(s) Inhaler 2 Puff(s) Inhalation two times a day  cefTRIAXone   IVPB 1000 milliGRAM(s) IV Intermittent every 24 hours  clopidogrel Tablet 75 milliGRAM(s) Oral daily  dextrose 5%. 1000 milliLiter(s) (50 mL/Hr) IV Continuous <Continuous>  dextrose 50% Injectable 12.5 Gram(s) IV Push once  dextrose 50% Injectable 25 Gram(s) IV Push once  dextrose 50% Injectable 25 Gram(s) IV Push once  heparin   Injectable 5000 Unit(s) SubCutaneous every 8 hours  insulin glargine Injectable (LANTUS) 10 Unit(s) SubCutaneous at bedtime  insulin lispro (HumaLOG) corrective regimen sliding scale   SubCutaneous three times a day before meals  insulin lispro (HumaLOG) corrective regimen sliding scale   SubCutaneous at bedtime  insulin lispro Injectable (HumaLOG) 2 Unit(s) SubCutaneous three times a day before meals  lisinopril 10 milliGRAM(s) Oral daily  metoprolol succinate ER 50 milliGRAM(s) Oral daily  sevelamer carbonate 800 milliGRAM(s) Oral three times a day with meals    MEDICATIONS  (PRN):  acetaminophen   Tablet .. 650 milliGRAM(s) Oral every 6 hours PRN Temp greater or equal to 38C (100.4F), Mild Pain (1 - 3), Moderate Pain (4 - 6)  dextrose 40% Gel 15 Gram(s) Oral once PRN Blood Glucose LESS THAN 70 milliGRAM(s)/deciliter  glucagon  Injectable 1 milliGRAM(s) IntraMuscular once PRN Glucose LESS THAN 70 milligrams/deciliter      EXAM:  Vital Signs Last 24 Hrs  T(C): 36.4 (20 Jun 2020 06:00), Max: 36.8 (19 Jun 2020 14:10)  T(F): 97.6 (20 Jun 2020 06:00), Max: 98.2 (19 Jun 2020 14:10)  HR: 77 (20 Jun 2020 06:00) (70 - 77)  BP: 150/77 (20 Jun 2020 06:00) (136/70 - 150/77)  BP(mean): --  RR: 18 (20 Jun 2020 06:00) (10 - 18)  SpO2: 93% (20 Jun 2020 06:00) (93% - 100%)    GENERAL: The patient is awake and alert in no apparent distress.     LUNGS: respirations unlabored    HEART: Regular rate                             9.8    4.24  )-----------( 182      ( 20 Jun 2020 06:51 )             31.7       06-20    130<L>  |  90<L>  |  28<H>  ----------------------------<  300<H>  5.0   |  24  |  4.93<H>    Ca    8.7      20 Jun 2020 06:51    TPro  6.6  /  Alb  4.1  /  TBili  0.3  /  DBili  x   /  AST  17  /  ALT  8<L>  /  AlkPhos  228<H>  06-18   < from: CT Angio Chest w/ IV Cont (06.19.20 @ 14:49) >    EXAM:  CT ANGIO CHEST (W)AW IC                            PROCEDURE DATE:  06/19/2020            INTERPRETATION:  CLINICAL INFORMATION: Shortness of breath    COMPARISON: Chest radiograph 6/18/2020 and CT chest 12/3/2019    PROCEDURE:   CT Angiography of the Chest.  40 ml of Omnipaque 350 was injected intravenously. 60 ml were discarded.  Sagittal and coronal reformats were performed as well as 3D (MIP) reconstructions.    FINDINGS:    LUNGS AND AIRWAYS: Debris within the upper trachea.  Bibasilar subsegmental atelectasis. There are a few scattered cysts throughout the bilateral lungs. Interlobular septal thickening and groundglass opacities in the right upper lobe consistent with pulmonary edema. Redemonstration of a right upper lobe groundglass opacity measuring approximately 1.5 cm (5-47), without significant interval change compared to 12/3/2019 when allowing for differences in technique. Redemonstration of a left upper lobe groundglass opacity which measures 1.3 cm (5-31) no significant interval change when compared to 12/3/2019 when allowing for differences in technique.  PLEURA: Trace bilateral pleural effusions.  MEDIASTINUM AND SANDOVAL: Unchanged mediastinal and hilar lymphadenopathy.  VESSELS: No pulmonary emboli. Left subclavian metallic stent. Coronary and aortic calcified plaque.  HEART: Cardiomegaly. The left atrium, in particular, is enlarged. Left atrial appendage is not fully opacified. No pericardial effusion.  CHEST WALL AND LOWER NECK: Within normal limits.  VISUALIZED UPPER ABDOMEN: Atrophic left kidney compatible with ESRD. Scattered splenic calcified granulomata.  BONES: Diffuse osseous sclerosis compatible renal osteodystrophy. Chronic right posterior seventh rib deformity.    IMPRESSION:     1.  No pulmonary emboli.  2.  Pulmonary edema and small bilateral pleural effusions.  3.  No significant interval change in the size of right upper and left upper lobe groundglass opacities, likely adenocarcinoma in situ.          < end of copied text >      PROBLEM LIST:  64y Female with HEALTH ISSUES - PROBLEM Dx:  CAD (coronary artery disease): CAD (coronary artery disease)  Type 1 diabetes mellitus with chronic kidney disease on chronic dialysis: Type 1 diabetes mellitus with chronic kidney disease on chronic dialysis  Pain in both lower extremities: Pain in both lower extremities  Acute on chronic systolic heart failure: Acute on chronic systolic heart failure  ESRD (end stage renal disease) on dialysis: ESRD (end stage renal disease) on dialysis  Community acquired bacterial pneumonia: Community acquired bacterial pneumonia            RECS:  check pulse ox on 1L  diuresis   outpatient f/u on her CT chest with DR Thompson      Please call with any questions.    Chelle Kapoor DO  [facility] Pulmonary/Sleep Medicine  [phone] PULMONARY PROGRESS NOTE    NATHAN MEEKS  MRN-[id number]    Patient is a 64y old  Female who presents with a chief complaint of SOB, LE pain (20 Jun 2020 10:35)      HPI:   leg pain  getting HD- now   per HD RN she complained of dyspnea- went from 2L to 4L   pending vitals check    ROS:   -    ACTIVE MEDICATION LIST:  MEDICATIONS  (STANDING):  aspirin enteric coated 81 milliGRAM(s) Oral daily  atorvastatin 20 milliGRAM(s) Oral at bedtime  azithromycin  IVPB 500 milliGRAM(s) IV Intermittent every 24 hours  buDESOnide   90 MICROgram(s) Inhaler 2 Puff(s) Inhalation two times a day  cefTRIAXone   IVPB 1000 milliGRAM(s) IV Intermittent every 24 hours  clopidogrel Tablet 75 milliGRAM(s) Oral daily  dextrose 5%. 1000 milliLiter(s) (50 mL/Hr) IV Continuous <Continuous>  dextrose 50% Injectable 12.5 Gram(s) IV Push once  dextrose 50% Injectable 25 Gram(s) IV Push once  dextrose 50% Injectable 25 Gram(s) IV Push once  heparin   Injectable 5000 Unit(s) SubCutaneous every 8 hours  insulin glargine Injectable (LANTUS) 10 Unit(s) SubCutaneous at bedtime  insulin lispro (HumaLOG) corrective regimen sliding scale   SubCutaneous three times a day before meals  insulin lispro (HumaLOG) corrective regimen sliding scale   SubCutaneous at bedtime  insulin lispro Injectable (HumaLOG) 2 Unit(s) SubCutaneous three times a day before meals  lisinopril 10 milliGRAM(s) Oral daily  metoprolol succinate ER 50 milliGRAM(s) Oral daily  sevelamer carbonate 800 milliGRAM(s) Oral three times a day with meals    MEDICATIONS  (PRN):  acetaminophen   Tablet .. 650 milliGRAM(s) Oral every 6 hours PRN Temp greater or equal to 38C (100.4F), Mild Pain (1 - 3), Moderate Pain (4 - 6)  dextrose 40% Gel 15 Gram(s) Oral once PRN Blood Glucose LESS THAN 70 milliGRAM(s)/deciliter  glucagon  Injectable 1 milliGRAM(s) IntraMuscular once PRN Glucose LESS THAN 70 milligrams/deciliter      EXAM:  Vital Signs Last 24 Hrs  T(C): 36.4 (20 Jun 2020 06:00), Max: 36.8 (19 Jun 2020 14:10)  T(F): 97.6 (20 Jun 2020 06:00), Max: 98.2 (19 Jun 2020 14:10)  HR: 77 (20 Jun 2020 06:00) (70 - 77)  BP: 150/77 (20 Jun 2020 06:00) (136/70 - 150/77)  BP(mean): --  RR: 18 (20 Jun 2020 06:00) (10 - 18)  SpO2: 93% (20 Jun 2020 06:00) (93% - 100%)    GENERAL: The patient is awake and alert in no apparent distress.     LUNGS: respirations unlabored    HEART: Regular rate                             9.8    4.24  )-----------( 182      ( 20 Jun 2020 06:51 )             31.7       06-20    130<L>  |  90<L>  |  28<H>  ----------------------------<  300<H>  5.0   |  24  |  4.93<H>    Ca    8.7      20 Jun 2020 06:51    TPro  6.6  /  Alb  4.1  /  TBili  0.3  /  DBili  x   /  AST  17  /  ALT  8<L>  /  AlkPhos  228<H>  06-18   < from: CT Angio Chest w/ IV Cont (06.19.20 @ 14:49) >    EXAM:  CT ANGIO CHEST (W)AW IC                            PROCEDURE DATE:  06/19/2020            INTERPRETATION:  CLINICAL INFORMATION: Shortness of breath    COMPARISON: Chest radiograph 6/18/2020 and CT chest 12/3/2019    PROCEDURE:   CT Angiography of the Chest.  40 ml of Omnipaque 350 was injected intravenously. 60 ml were discarded.  Sagittal and coronal reformats were performed as well as 3D (MIP) reconstructions.    FINDINGS:    LUNGS AND AIRWAYS: Debris within the upper trachea.  Bibasilar subsegmental atelectasis. There are a few scattered cysts throughout the bilateral lungs. Interlobular septal thickening and groundglass opacities in the right upper lobe consistent with pulmonary edema. Redemonstration of a right upper lobe groundglass opacity measuring approximately 1.5 cm (5-47), without significant interval change compared to 12/3/2019 when allowing for differences in technique. Redemonstration of a left upper lobe groundglass opacity which measures 1.3 cm (5-31) no significant interval change when compared to 12/3/2019 when allowing for differences in technique.  PLEURA: Trace bilateral pleural effusions.  MEDIASTINUM AND SANDOVAL: Unchanged mediastinal and hilar lymphadenopathy.  VESSELS: No pulmonary emboli. Left subclavian metallic stent. Coronary and aortic calcified plaque.  HEART: Cardiomegaly. The left atrium, in particular, is enlarged. Left atrial appendage is not fully opacified. No pericardial effusion.  CHEST WALL AND LOWER NECK: Within normal limits.  VISUALIZED UPPER ABDOMEN: Atrophic left kidney compatible with ESRD. Scattered splenic calcified granulomata.  BONES: Diffuse osseous sclerosis compatible renal osteodystrophy. Chronic right posterior seventh rib deformity.    IMPRESSION:     1.  No pulmonary emboli.  2.  Pulmonary edema and small bilateral pleural effusions.  3.  No significant interval change in the size of right upper and left upper lobe groundglass opacities, likely adenocarcinoma in situ.          < end of copied text >      PROBLEM LIST:  64y Female with HEALTH ISSUES - PROBLEM Dx:  CAD (coronary artery disease): CAD (coronary artery disease)  Type 1 diabetes mellitus with chronic kidney disease on chronic dialysis: Type 1 diabetes mellitus with chronic kidney disease on chronic dialysis  Pain in both lower extremities: Pain in both lower extremities  Acute on chronic systolic heart failure: Acute on chronic systolic heart failure  ESRD (end stage renal disease) on dialysis: ESRD (end stage renal disease) on dialysis  Community acquired bacterial pneumonia: Community acquired bacterial pneumonia            RECS:  check pulse ox on 4L  titrate down after HD as tolerated  speech/swallow eval? re: debris in trachae  HD as scheduled   outpatient f/u on her CT chest with DR Thompson      Please call with any questions.    Chelle Kapoor DO  [facility] Pulmonary/Sleep Medicine  [phone]

## 2020-06-20 NOTE — PROGRESS NOTE ADULT - SUBJECTIVE AND OBJECTIVE BOX
Cardiovascular Disease Progress Note    Overnight events: No acute events overnight.  left leg pain and swelling. no cp/sob  Otherwise review of systems negative    Objective Findings:  T(C): 36.4 (06-20-20 @ 06:00), Max: 36.8 (06-19-20 @ 14:10)  HR: 77 (06-20-20 @ 06:00) (70 - 77)  BP: 150/77 (06-20-20 @ 06:00) (136/70 - 150/77)  RR: 18 (06-20-20 @ 06:00) (10 - 18)  SpO2: 93% (06-20-20 @ 06:00) (93% - 100%)  Wt(kg): --  Daily     Daily       Physical Exam:  Gen: NAD  HEENT: EOMI  CV: RRR, normal S1 + S2, no m/r/g  Lungs: CTAB  Abd: soft, non-tender  Ext: left leg edema      Laboratory Data:                        9.8    4.24  )-----------( 182      ( 20 Jun 2020 06:51 )             31.7     06-20    130<L>  |  90<L>  |  28<H>  ----------------------------<  300<H>  5.0   |  24  |  4.93<H>    Ca    8.7      20 Jun 2020 06:51    TPro  6.6  /  Alb  4.1  /  TBili  0.3  /  DBili  x   /  AST  17  /  ALT  8<L>  /  AlkPhos  228<H>  06-18    PT/INR - ( 18 Jun 2020 17:25 )   PT: 13.0 sec;   INR: 1.13 ratio         PTT - ( 18 Jun 2020 17:25 )  PTT:33.4 sec          Inpatient Medications:  MEDICATIONS  (STANDING):  aspirin enteric coated 81 milliGRAM(s) Oral daily  atorvastatin 20 milliGRAM(s) Oral at bedtime  azithromycin  IVPB 500 milliGRAM(s) IV Intermittent every 24 hours  buDESOnide   90 MICROgram(s) Inhaler 2 Puff(s) Inhalation two times a day  cefTRIAXone   IVPB 1000 milliGRAM(s) IV Intermittent every 24 hours  clopidogrel Tablet 75 milliGRAM(s) Oral daily  dextrose 5%. 1000 milliLiter(s) (50 mL/Hr) IV Continuous <Continuous>  dextrose 50% Injectable 12.5 Gram(s) IV Push once  dextrose 50% Injectable 25 Gram(s) IV Push once  dextrose 50% Injectable 25 Gram(s) IV Push once  heparin   Injectable 5000 Unit(s) SubCutaneous every 8 hours  insulin glargine Injectable (LANTUS) 10 Unit(s) SubCutaneous at bedtime  insulin lispro (HumaLOG) corrective regimen sliding scale   SubCutaneous three times a day before meals  insulin lispro (HumaLOG) corrective regimen sliding scale   SubCutaneous at bedtime  insulin lispro Injectable (HumaLOG) 2 Unit(s) SubCutaneous three times a day before meals  lisinopril 10 milliGRAM(s) Oral daily  metoprolol succinate ER 50 milliGRAM(s) Oral daily  sevelamer carbonate 800 milliGRAM(s) Oral three times a day with meals      Assessment:  -esrhd on hd  -ischemic cardiomyopathy c/b mod to severe LV dysfunction, cad s/p multiple stents  -severe PAD s/p prior interventions  -s/p novel coronavirus/sars-cov2 infection 4/7/20  -copd       Recs:  -optimization of vol status with hd  -left leg swelling likely combination of hypervolemia, venous insufficiency and PAD  -c/w metop and lisinopril for gdmt for lv dysfunction; NSVT and pAT  -cw dapt and statin for cad/stents, pad  -dvt ppx        Over 25 minutes spent on total encounter; more than 50% of the visit was spent counseling and/or coordinating care by the attending physician.      Julián Biswas MD   Cardiovascular Disease  [phone]

## 2020-06-20 NOTE — PROGRESS NOTE ADULT - SUBJECTIVE AND OBJECTIVE BOX
Patient is a 64y old  Female who presents with a chief complaint of SOB, LE pain (20 Jun 2020 13:00)      SUBJECTIVE / OVERNIGHT EVENTS: Comfortable without new complaints. HD in progress  Review of Systems  chest pain no  palpitations no  sob no  nausea no  headache no    MEDICATIONS  (STANDING):  aspirin enteric coated 81 milliGRAM(s) Oral daily  atorvastatin 20 milliGRAM(s) Oral at bedtime  azithromycin  IVPB 500 milliGRAM(s) IV Intermittent every 24 hours  buDESOnide   90 MICROgram(s) Inhaler 2 Puff(s) Inhalation two times a day  cefTRIAXone   IVPB 1000 milliGRAM(s) IV Intermittent every 24 hours  clopidogrel Tablet 75 milliGRAM(s) Oral daily  dextrose 5%. 1000 milliLiter(s) (50 mL/Hr) IV Continuous <Continuous>  dextrose 50% Injectable 12.5 Gram(s) IV Push once  dextrose 50% Injectable 25 Gram(s) IV Push once  dextrose 50% Injectable 25 Gram(s) IV Push once  heparin   Injectable 5000 Unit(s) SubCutaneous every 8 hours  insulin glargine Injectable (LANTUS) 10 Unit(s) SubCutaneous at bedtime  insulin lispro (HumaLOG) corrective regimen sliding scale   SubCutaneous three times a day before meals  insulin lispro (HumaLOG) corrective regimen sliding scale   SubCutaneous at bedtime  insulin lispro Injectable (HumaLOG) 2 Unit(s) SubCutaneous three times a day before meals  lisinopril 10 milliGRAM(s) Oral daily  metoprolol succinate ER 50 milliGRAM(s) Oral daily  sevelamer carbonate 800 milliGRAM(s) Oral three times a day with meals    MEDICATIONS  (PRN):  acetaminophen   Tablet .. 650 milliGRAM(s) Oral every 6 hours PRN Temp greater or equal to 38C (100.4F), Mild Pain (1 - 3), Moderate Pain (4 - 6)  dextrose 40% Gel 15 Gram(s) Oral once PRN Blood Glucose LESS THAN 70 milliGRAM(s)/deciliter  glucagon  Injectable 1 milliGRAM(s) IntraMuscular once PRN Glucose LESS THAN 70 milligrams/deciliter      Vital Signs Last 24 Hrs  T(C): 36.4 (20 Jun 2020 16:50), Max: 36.7 (20 Jun 2020 15:40)  T(F): 97.5 (20 Jun 2020 16:50), Max: 98 (20 Jun 2020 15:40)  HR: 70 (20 Jun 2020 16:50) (59 - 77)  BP: 174/80 (20 Jun 2020 16:50) (131/74 - 174/80)  BP(mean): --  RR: 18 (20 Jun 2020 16:50) (10 - 18)  SpO2: 99% (20 Jun 2020 16:50) (93% - 100%)    PHYSICAL EXAM:  GENERAL: NAD, well-developed  HEAD:  Atraumatic, Normocephalic  EYES: EOMI, PERRLA, conjunctiva and sclera clear  NECK: Supple, No JVD  CHEST/LUNG: Clear to auscultation bilaterally; No wheeze  HEART: Regular rate and rhythm; No murmurs, rubs, or gallops  ABDOMEN: Soft, Nontender, Nondistended; Bowel sounds present  EXTREMITIES:  2+ bipedal edema L>R  PSYCH: AAOx3  NEUROLOGY: non-focal  SKIN: No rashes or lesions    LABS:                        9.8    4.24  )-----------( 182      ( 20 Jun 2020 06:51 )             31.7     06-20    130<L>  |  90<L>  |  28<H>  ----------------------------<  300<H>  5.0   |  24  |  4.93<H>    Ca    8.7      20 Jun 2020 06:51                Culture - Blood (collected 18 Jun 2020 22:19)  Source: .Blood Blood-Venous  Preliminary Report (19 Jun 2020 23:01):    No growth to date.    Culture - Blood (collected 18 Jun 2020 22:01)  Source: .Blood Blood-Venous  Preliminary Report (19 Jun 2020 23:01):    No growth to date.        RADIOLOGY & ADDITIONAL TESTS:    Imaging Personally Reviewed:    Consultant(s) Notes Reviewed:      Care Discussed with Consultants/Other Providers:

## 2020-06-20 NOTE — PROGRESS NOTE ADULT - ASSESSMENT
65 yo F with PMH of ESRD on HD (TTS), CAD s/p stents, CHF (EF 35%) with severe AS, T1DM, COPD, CVA with no residuals p/w acute onset SOB and LE pain. Pt was recently hospitalized for SOB persistently COVID+ during that visit now with sob -- volume overload +/- pneumonia

## 2020-06-20 NOTE — PROGRESS NOTE ADULT - PROBLEM SELECTOR PLAN 1
HD today  Patient currently tolerating treatment  Dose meds for eGFR<15  continue phos binders as ordered

## 2020-06-20 NOTE — CHART NOTE - NSCHARTNOTEFT_GEN_A_CORE
Medicine Np note    CC: LE pain  Notified by RN that patent c/o B/L LE pain. Evalauted the pt at bedside.   B/L LE with no cyanosis, 1+ swelling +, DP/PT pulses strong    64F with PMH of ESRD on HD (TTS), CAD s/p stents, CHF (EF 35%) with severe AS, T1DM, COPD, CVA with no residuals p/w acute onset SOB and LE pain, admitted for RLL PNA and volume overload in setting of missed HD      LE pain  No vascular compromise  Doppler LE pending  Patient received percocet x3 doses during day, which relives pain per pt  Patient received Tylenol now, oxycodone IR x1 dose ordered  Reassess pain level  Will follow    Husam Gipson P BC  [phone]

## 2020-06-20 NOTE — PROGRESS NOTE ADULT - SUBJECTIVE AND OBJECTIVE BOX
[facility]   [phone]  Dr. Urban (covering for Dr. Burger)  DIALYSIS NOTE  Patient currently on HD.  As per HD RN, BFR of only 350 achieved today.  Otherwise patient tolerating treatment    ALLERGIES & MEDICATIONS  --------------------------------------------------------------------------------  Allergies    No Known Allergies    Intolerances      Standing Inpatient Medications  aspirin enteric coated 81 milliGRAM(s) Oral daily  atorvastatin 20 milliGRAM(s) Oral at bedtime  azithromycin  IVPB 500 milliGRAM(s) IV Intermittent every 24 hours  buDESOnide   90 MICROgram(s) Inhaler 2 Puff(s) Inhalation two times a day  cefTRIAXone   IVPB 1000 milliGRAM(s) IV Intermittent every 24 hours  clopidogrel Tablet 75 milliGRAM(s) Oral daily  dextrose 5%. 1000 milliLiter(s) IV Continuous <Continuous>  dextrose 50% Injectable 12.5 Gram(s) IV Push once  dextrose 50% Injectable 25 Gram(s) IV Push once  dextrose 50% Injectable 25 Gram(s) IV Push once  heparin   Injectable 5000 Unit(s) SubCutaneous every 8 hours  insulin glargine Injectable (LANTUS) 10 Unit(s) SubCutaneous at bedtime  insulin lispro (HumaLOG) corrective regimen sliding scale   SubCutaneous three times a day before meals  insulin lispro (HumaLOG) corrective regimen sliding scale   SubCutaneous at bedtime  insulin lispro Injectable (HumaLOG) 2 Unit(s) SubCutaneous three times a day before meals  lisinopril 10 milliGRAM(s) Oral daily  metoprolol succinate ER 50 milliGRAM(s) Oral daily  sevelamer carbonate 800 milliGRAM(s) Oral three times a day with meals    PRN Inpatient Medications  acetaminophen   Tablet .. 650 milliGRAM(s) Oral every 6 hours PRN  dextrose 40% Gel 15 Gram(s) Oral once PRN  glucagon  Injectable 1 milliGRAM(s) IntraMuscular once PRN      REVIEW OF SYSTEMS  --------------------------------------------------------------------------------  As per HD RN, patient reports no pain, dizziness/lightheadedness, nausea, SOB    All other systems were reviewed and are negative, except as noted.    VITALS/PHYSICAL EXAM  --------------------------------------------------------------------------------  T(C): 36.6 (06-20-20 @ 11:40), Max: 36.8 (06-19-20 @ 14:10)  HR: 67 (06-20-20 @ 11:40) (67 - 77)  BP: 131/74 (06-20-20 @ 11:40) (131/74 - 150/77)  RR: 17 (06-20-20 @ 11:40) (10 - 18)  SpO2: 93% (06-20-20 @ 06:00) (93% - 100%)  Wt(kg): --  Height (cm): 162.56 (06-18-20 @ 16:44)  Weight (kg): 54.4 (06-18-20 @ 16:44)  BMI (kg/m2): 20.6 (06-18-20 @ 16:44)  BSA (m2): 1.57 (06-18-20 @ 16:44)      06-19-20 @ 07:01  -  06-20-20 @ 07:00  --------------------------------------------------------  IN: 1000 mL / OUT: 0 mL / NET: 1000 mL      Physical Exam:  	Gen: Frail-appearing  	Pulm: respirations unlabored  	CV: RRR, S1S2  	LLE Edema  	Vascular access: LUE AV access being utilized for HD    LABS/STUDIES  --------------------------------------------------------------------------------              9.8    4.24  >-----------<  182      [06-20-20 @ 06:51]              31.7     130  |  90  |  28  ----------------------------<  300      [06-20-20 @ 06:51]  5.0   |  24  |  4.93        Ca     8.7     [06-20-20 @ 06:51]    TPro  6.6  /  Alb  4.1  /  TBili  0.3  /  DBili  x   /  AST  17  /  ALT  8   /  AlkPhos  228  [06-18-20 @ 17:25]    PT/INR: PT 13.0 , INR 1.13       [06-18-20 @ 17:25]  PTT: 33.4       [06-18-20 @ 17:25]

## 2020-06-20 NOTE — PROGRESS NOTE ADULT - ASSESSMENT
64F with PMH of ESRD on HD (TTS), CAD s/p stents, CHF (EF 35%) with severe AS, T1DM, COPD, CVA with no residuals p/w acute onset SOB and LE pain, admitted for RLL PNA and volume overload in setting of missed HD      Community acquired bacterial pneumonia.   - pt with SOB and cough, with CXR showing new RLL opacity c/w PNA  - continue ceftriaxone and azithromycin   - f/u cultures  - monitor fever curve.   - pulmonary follow  - ID evaluation    ESRD (end stage renal disease) on dialysis.  - ESRD on HD, missed HD session earlier today  - Nephrology follow.     Acute on chronic systolic heart failure.   - SOB, elevated BNP and lower extremity edema c/w likely HF exacerbation, with last EF 35% and severe AS   - unable to diurese 2/2 anuria   - fluid removal via HD per renal   - monitor I/Os, daily weights  - monitor electrolytes   - c/w BB and ACEi.  - cardiology follow     Pain in both lower extremities.  - acute on chronic b/l LE pain, L>R; currently improved in ED without intervention - likely in setting of PAD. Also with significant swelling in LEs  - US dopplers to r/o DVT   - fluid removal with HD as above   - tylenol for pain control  - outpt vascular f/u.     Type 1 diabetes mellitus with chronic kidney disease on chronic dialysis.   - c/w lantus 10u qhs, 2u premeals TID   - low sliding scale insulin  - FS ac and hs.     CAD (coronary artery disease).  - c/w ASA and plavix.  - cardiology follow    Pierce Viera MD pager [phone]

## 2020-06-21 LAB
ANION GAP SERPL CALC-SCNC: 14 MMOL/L — SIGNIFICANT CHANGE UP (ref 5–17)
BUN SERPL-MCNC: 16 MG/DL — SIGNIFICANT CHANGE UP (ref 7–23)
CALCIUM SERPL-MCNC: 9 MG/DL — SIGNIFICANT CHANGE UP (ref 8.4–10.5)
CHLORIDE SERPL-SCNC: 94 MMOL/L — LOW (ref 96–108)
CO2 SERPL-SCNC: 28 MMOL/L — SIGNIFICANT CHANGE UP (ref 22–31)
CREAT SERPL-MCNC: 3.47 MG/DL — HIGH (ref 0.5–1.3)
GLUCOSE BLDC GLUCOMTR-MCNC: 111 MG/DL — HIGH (ref 70–99)
GLUCOSE BLDC GLUCOMTR-MCNC: 132 MG/DL — HIGH (ref 70–99)
GLUCOSE BLDC GLUCOMTR-MCNC: 135 MG/DL — HIGH (ref 70–99)
GLUCOSE BLDC GLUCOMTR-MCNC: 199 MG/DL — HIGH (ref 70–99)
GLUCOSE BLDC GLUCOMTR-MCNC: 248 MG/DL — HIGH (ref 70–99)
GLUCOSE BLDC GLUCOMTR-MCNC: 89 MG/DL — SIGNIFICANT CHANGE UP (ref 70–99)
GLUCOSE SERPL-MCNC: 162 MG/DL — HIGH (ref 70–99)
HCT VFR BLD CALC: 31.8 % — LOW (ref 34.5–45)
HGB BLD-MCNC: 9.8 G/DL — LOW (ref 11.5–15.5)
MCHC RBC-ENTMCNC: 30.8 GM/DL — LOW (ref 32–36)
MCHC RBC-ENTMCNC: 33.8 PG — SIGNIFICANT CHANGE UP (ref 27–34)
MCV RBC AUTO: 109.7 FL — HIGH (ref 80–100)
NRBC # BLD: 0 /100 WBCS — SIGNIFICANT CHANGE UP (ref 0–0)
PLATELET # BLD AUTO: 201 K/UL — SIGNIFICANT CHANGE UP (ref 150–400)
POTASSIUM SERPL-MCNC: 3.8 MMOL/L — SIGNIFICANT CHANGE UP (ref 3.5–5.3)
POTASSIUM SERPL-SCNC: 3.8 MMOL/L — SIGNIFICANT CHANGE UP (ref 3.5–5.3)
RBC # BLD: 2.9 M/UL — LOW (ref 3.8–5.2)
RBC # FLD: 14.5 % — SIGNIFICANT CHANGE UP (ref 10.3–14.5)
SODIUM SERPL-SCNC: 136 MMOL/L — SIGNIFICANT CHANGE UP (ref 135–145)
WBC # BLD: 4.12 K/UL — SIGNIFICANT CHANGE UP (ref 3.8–10.5)
WBC # FLD AUTO: 4.12 K/UL — SIGNIFICANT CHANGE UP (ref 3.8–10.5)

## 2020-06-21 RX ORDER — OXYCODONE AND ACETAMINOPHEN 5; 325 MG/1; MG/1
1 TABLET ORAL ONCE
Refills: 0 | Status: DISCONTINUED | OUTPATIENT
Start: 2020-06-21 | End: 2020-06-21

## 2020-06-21 RX ADMIN — Medication 2 PUFF(S): at 05:57

## 2020-06-21 RX ADMIN — Medication 1: at 11:26

## 2020-06-21 RX ADMIN — Medication 50 MILLIGRAM(S): at 05:57

## 2020-06-21 RX ADMIN — Medication 81 MILLIGRAM(S): at 11:25

## 2020-06-21 RX ADMIN — LISINOPRIL 10 MILLIGRAM(S): 2.5 TABLET ORAL at 05:57

## 2020-06-21 RX ADMIN — OXYCODONE AND ACETAMINOPHEN 1 TABLET(S): 5; 325 TABLET ORAL at 11:27

## 2020-06-21 RX ADMIN — ATORVASTATIN CALCIUM 20 MILLIGRAM(S): 80 TABLET, FILM COATED ORAL at 22:49

## 2020-06-21 RX ADMIN — Medication 2 PUFF(S): at 16:57

## 2020-06-21 RX ADMIN — SEVELAMER CARBONATE 800 MILLIGRAM(S): 2400 POWDER, FOR SUSPENSION ORAL at 16:54

## 2020-06-21 RX ADMIN — Medication 2 UNIT(S): at 16:55

## 2020-06-21 RX ADMIN — SEVELAMER CARBONATE 800 MILLIGRAM(S): 2400 POWDER, FOR SUSPENSION ORAL at 11:30

## 2020-06-21 RX ADMIN — Medication 2 UNIT(S): at 09:28

## 2020-06-21 RX ADMIN — OXYCODONE AND ACETAMINOPHEN 1 TABLET(S): 5; 325 TABLET ORAL at 23:27

## 2020-06-21 RX ADMIN — CLOPIDOGREL BISULFATE 75 MILLIGRAM(S): 75 TABLET, FILM COATED ORAL at 11:27

## 2020-06-21 RX ADMIN — SEVELAMER CARBONATE 800 MILLIGRAM(S): 2400 POWDER, FOR SUSPENSION ORAL at 09:28

## 2020-06-21 RX ADMIN — Medication 2 UNIT(S): at 11:26

## 2020-06-21 RX ADMIN — INSULIN GLARGINE 10 UNIT(S): 100 INJECTION, SOLUTION SUBCUTANEOUS at 23:27

## 2020-06-21 NOTE — PROGRESS NOTE ADULT - ASSESSMENT
64F with PMH of ESRD on HD (TTS), CAD s/p stents, CHF (EF 35%) with severe AS, T1DM, COPD, CVA with no residuals p/w acute onset SOB and LE pain, admitted for RLL PNA and volume overload in setting of missed HD      Community acquired bacterial pneumonia.   - pt with SOB and cough, with CXR showing new RLL opacity c/w PNA  - observe off antibiotics   - f/u cultures  - monitor fever curve.   - pulmonary follow  - ID evaluation    ESRD (end stage renal disease) on dialysis.  - ESRD on HD, missed HD session earlier today  - Nephrology follow.     Acute on chronic systolic heart failure.   - SOB, elevated BNP and lower extremity edema c/w likely HF exacerbation, with last EF 35% and severe AS   - unable to diurese 2/2 anuria   - fluid removal via HD per renal   - monitor I/Os, daily weights  - monitor electrolytes   - c/w BB and ACEi.  - cardiology follow     Pain in both lower extremities.  - acute on chronic b/l LE pain, L>R; currently improved in ED without intervention - likely in setting of PAD. Also with significant swelling in LEs  - US dopplers to r/o DVT   - fluid removal with HD as above   - tylenol for pain control  - outpt vascular f/u.     Type 1 diabetes mellitus with chronic kidney disease on chronic dialysis.   - c/w lantus 10u qhs, 2u premeals TID   - low sliding scale insulin  - FS ac and hs.     CAD (coronary artery disease).  - c/w ASA and plavix.  - cardiology follow    DCP home.    Pierce Viera MD pager [phone]

## 2020-06-21 NOTE — CONSULT NOTE ADULT - SUBJECTIVE AND OBJECTIVE BOX
HPI:   Patient is a 64y female with esrd on hd, pvd s/p bilat le bypass with chronic swelling of left leg, copd, dm, aortic stenosis, ischemic cardiomyopathy , cva with recent recurrent stays for various issues including mild covid, copd, lle cellultisi, avf revision. She returned a few days ago with sob and increase in lle swelling after she has been dialyzed less often that her usual 4 days a week. No fever, no cough, no chills, no myalgia. Her left leg is always more swollen than right but with more fluid retention with less dialysis. She is much better now and wants to go home. Her leg is back to baseline. She is using home oxygen of late.     REVIEW OF SYSTEMS:  All other review of systems negative (Comprehensive ROS)    PAST MEDICAL & SURGICAL HISTORY:  COPD (chronic obstructive pulmonary disease)  Localized enlarged lymph nodes  CHF (congestive heart failure): EF 40-45%  Subclavian vein stenosis, left: s/p stent  DKA, type 1: 1/2015  ACS (acute coronary syndrome): 1/2015 - cath revealed 100% ostial stenosis not amenable to PCI - medical management  TIA (transient ischemic attack): x 2 - 8-9 years ago prior to ASD/VSD repair  CAD (coronary artery disease): s/p stents  Gout: past  CVA (cerebral infarction): with no residual, 8 yrs ago, prior to heart surgery - ST memory loss  Peripheral vascular disease: occluded left fem-pop bypass 5/2015  Diabetes mellitus type 1: Insulin Dependent -  ESRD (end stage renal disease): dialysis  M, tue, thursday, saturday  Hyperlipidemia  Status post device closure of ASD: &quot;[place]&quot;  History of cardiac catheterization: 1/2015 - no intervention  S/P femoral-popliteal bypass surgery: L and R in 2013 with graft; 5/2015 CFA angioplasty left and ileofemoral endarterectomywith vein patch angioplasty of left fem-pop bypass graft  Multiple vascular surgery both leg, left fempop bypass revision 11/2015  AV (arteriovenous fistula): Left AV graft; revision with stent placement 2-3 years ago  S/P cholecystectomy      Allergies    No Known Allergies    Intolerances        Antimicrobials Day #  :  azithromycin  IVPB 500 milliGRAM(s) IV Intermittent every 24 hours  cefTRIAXone   IVPB 1000 milliGRAM(s) IV Intermittent every 24 hours    Other Medications:  acetaminophen   Tablet .. 650 milliGRAM(s) Oral every 6 hours PRN  aspirin enteric coated 81 milliGRAM(s) Oral daily  atorvastatin 20 milliGRAM(s) Oral at bedtime  buDESOnide   90 MICROgram(s) Inhaler 2 Puff(s) Inhalation two times a day  clopidogrel Tablet 75 milliGRAM(s) Oral daily  dextrose 40% Gel 15 Gram(s) Oral once PRN  dextrose 5%. 1000 milliLiter(s) IV Continuous <Continuous>  dextrose 50% Injectable 12.5 Gram(s) IV Push once  dextrose 50% Injectable 25 Gram(s) IV Push once  dextrose 50% Injectable 25 Gram(s) IV Push once  glucagon  Injectable 1 milliGRAM(s) IntraMuscular once PRN  heparin   Injectable 5000 Unit(s) SubCutaneous every 8 hours  insulin glargine Injectable (LANTUS) 10 Unit(s) SubCutaneous at bedtime  insulin lispro (HumaLOG) corrective regimen sliding scale   SubCutaneous three times a day before meals  insulin lispro (HumaLOG) corrective regimen sliding scale   SubCutaneous at bedtime  insulin lispro Injectable (HumaLOG) 2 Unit(s) SubCutaneous three times a day before meals  lisinopril 10 milliGRAM(s) Oral daily  metoprolol succinate ER 50 milliGRAM(s) Oral daily  sevelamer carbonate 800 milliGRAM(s) Oral three times a day with meals      FAMILY HISTORY:  Family history of smoking  Family history of hypertension  Family history of cancer (Sibling)      SOCIAL HISTORY:  Smoking: [ ]Yes [ ]xNo  ETOH: [ ]Yes [x ]No  Drug Use: [ ]Yes [x ]No   [ ] Single[ x]    T(F): 97.4 (06-21-20 @ 10:42), Max: 99.2 (06-20-20 @ 21:55)  HR: 72 (06-21-20 @ 10:42)  BP: 137/71 (06-21-20 @ 10:42)  RR: 18 (06-21-20 @ 10:42)  SpO2: 97% (06-21-20 @ 10:42)  Wt(kg): --    PHYSICAL EXAM:  General: alert, no acute distress  Eyes:  anicteric, no conjunctival injection, no discharge  Oropharynx: no lesions or injection 	  Neck: supple, without adenopathy  Lungs: very distant bs to auscultation  Heart: regular rate and rhythm; 3/6 sys m  Abdomen: soft, nondistended, nontender, without mass or organomegaly  Skin: no lesions  Extremities: no clubbing, cyanosis,. bilateral leg edema but left more than right. both waxy  Neurologic: alert, oriented, moves all extremities    LAB RESULTS:                        9.8    4.12  )-----------( 201      ( 21 Jun 2020 06:26 )             31.8     06-21    136  |  94<L>  |  16  ----------------------------<  162<H>  3.8   |  28  |  3.47<H>    Ca    9.0      21 Jun 2020 06:26            MICROBIOLOGY:  RECENT CULTURES:  06-18 @ 22:19 .Blood Blood-Venous     No growth to date.      06-18 @ 22:01 .Blood Blood-Venous     No growth to date.            RADIOLOGY REVIEWED:    < from: CT Angio Chest w/ IV Cont (06.19.20 @ 14:49) >  EXAM:  CT ANGIO CHEST (W)AW IC                            PROCEDURE DATE:  06/19/2020            INTERPRETATION:  CLINICAL INFORMATION: Shortness of breath    COMPARISON: Chest radiograph 6/18/2020 and CT chest 12/3/2019    PROCEDURE:   CT Angiography of the Chest.  40 ml of Omnipaque 350 was injected intravenously. 60 ml were discarded.  Sagittal and coronal reformats were performed as well as 3D (MIP) reconstructions.    FINDINGS:    LUNGS AND AIRWAYS: Debris within the upper trachea.  Bibasilar subsegmental atelectasis. There are a few scattered cysts throughout the bilateral lungs. Interlobular septal thickening and groundglass opacities in the right upper lobe consistent with pulmonary edema. Redemonstration of a right upper lobe groundglass opacity measuring approximately 1.5 cm (5-47), without significant interval change compared to 12/3/2019 when allowing for differences in technique. Redemonstration of a left upper lobe groundglass opacity which measures 1.3 cm (5-31) no significant interval change when compared to 12/3/2019 when allowing for differences in technique.  PLEURA: Trace bilateral pleural effusions.  MEDIASTINUM AND SANDOVAL: Unchanged mediastinal and hilar lymphadenopathy.  VESSELS: No pulmonary emboli. Left subclavian metallic stent. Coronary and aortic calcified plaque.  HEART: Cardiomegaly. The left atrium, in particular, is enlarged. Left atrial appendage is not fully opacified. No pericardial effusion.  CHEST WALL AND LOWER NECK: Within normal limits.  VISUALIZED UPPER ABDOMEN: Atrophic left kidney compatible with ESRD. Scattered splenic calcified granulomata.  BONES: Diffuse osseous sclerosis compatible renal osteodystrophy. Chronic right posterior seventh rib deformity.    IMPRESSION:     1.  No pulmonary emboli.  2.  Pulmonary edema and small bilateral pleural effusions.  3.  No significant interval change in the size of right upper and left upper lobe groundglass opacities, likely adenocarcinoma in situ.          < end of copied text >        Impression:  patient with dm, pvd, cad, AS, esrd on dialysis admitted for sob. She has pulmonary edema on the ct chest and has been on less dialysis than usual and now feels better since back to where she was . She had recent covid. She may have lung cancer per radiologist so needs further evaluation by pulmonary. I dont see an indication for antibiotics at present. She has no pneumonia and no cellultiis and no fever.     Recommendations:  monitor off abx  pulmonary f/u for infiltrates

## 2020-06-22 ENCOUNTER — TRANSCRIPTION ENCOUNTER (OUTPATIENT)
Age: 65
End: 2020-06-22

## 2020-06-22 LAB
ANION GAP SERPL CALC-SCNC: 17 MMOL/L — SIGNIFICANT CHANGE UP (ref 5–17)
BUN SERPL-MCNC: 22 MG/DL — SIGNIFICANT CHANGE UP (ref 7–23)
CALCIUM SERPL-MCNC: 9 MG/DL — SIGNIFICANT CHANGE UP (ref 8.4–10.5)
CHLORIDE SERPL-SCNC: 94 MMOL/L — LOW (ref 96–108)
CO2 SERPL-SCNC: 21 MMOL/L — LOW (ref 22–31)
CREAT SERPL-MCNC: 5.08 MG/DL — HIGH (ref 0.5–1.3)
GLUCOSE BLDC GLUCOMTR-MCNC: 121 MG/DL — HIGH (ref 70–99)
GLUCOSE BLDC GLUCOMTR-MCNC: 134 MG/DL — HIGH (ref 70–99)
GLUCOSE BLDC GLUCOMTR-MCNC: 135 MG/DL — HIGH (ref 70–99)
GLUCOSE BLDC GLUCOMTR-MCNC: 212 MG/DL — HIGH (ref 70–99)
GLUCOSE SERPL-MCNC: 148 MG/DL — HIGH (ref 70–99)
HCT VFR BLD CALC: 34 % — LOW (ref 34.5–45)
HGB BLD-MCNC: 10.5 G/DL — LOW (ref 11.5–15.5)
MCHC RBC-ENTMCNC: 30.9 GM/DL — LOW (ref 32–36)
MCHC RBC-ENTMCNC: 34.2 PG — HIGH (ref 27–34)
MCV RBC AUTO: 110.7 FL — HIGH (ref 80–100)
NRBC # BLD: 0 /100 WBCS — SIGNIFICANT CHANGE UP (ref 0–0)
PLATELET # BLD AUTO: 230 K/UL — SIGNIFICANT CHANGE UP (ref 150–400)
POTASSIUM SERPL-MCNC: 4.4 MMOL/L — SIGNIFICANT CHANGE UP (ref 3.5–5.3)
POTASSIUM SERPL-SCNC: 4.4 MMOL/L — SIGNIFICANT CHANGE UP (ref 3.5–5.3)
RBC # BLD: 3.07 M/UL — LOW (ref 3.8–5.2)
RBC # FLD: 14.6 % — HIGH (ref 10.3–14.5)
SODIUM SERPL-SCNC: 132 MMOL/L — LOW (ref 135–145)
WBC # BLD: 4.01 K/UL — SIGNIFICANT CHANGE UP (ref 3.8–10.5)
WBC # FLD AUTO: 4.01 K/UL — SIGNIFICANT CHANGE UP (ref 3.8–10.5)

## 2020-06-22 PROCEDURE — 93970 EXTREMITY STUDY: CPT | Mod: 26,CS

## 2020-06-22 PROCEDURE — 99232 SBSQ HOSP IP/OBS MODERATE 35: CPT

## 2020-06-22 RX ORDER — INSULIN ASPART 100 [IU]/ML
2 INJECTION, SOLUTION SUBCUTANEOUS
Qty: 0 | Refills: 0 | DISCHARGE

## 2020-06-22 RX ORDER — OXYCODONE AND ACETAMINOPHEN 5; 325 MG/1; MG/1
1 TABLET ORAL ONCE
Refills: 0 | Status: DISCONTINUED | OUTPATIENT
Start: 2020-06-22 | End: 2020-06-22

## 2020-06-22 RX ADMIN — Medication 2: at 12:32

## 2020-06-22 RX ADMIN — Medication 2 PUFF(S): at 17:46

## 2020-06-22 RX ADMIN — OXYCODONE AND ACETAMINOPHEN 1 TABLET(S): 5; 325 TABLET ORAL at 19:03

## 2020-06-22 RX ADMIN — ATORVASTATIN CALCIUM 20 MILLIGRAM(S): 80 TABLET, FILM COATED ORAL at 22:21

## 2020-06-22 RX ADMIN — Medication 2 UNIT(S): at 08:17

## 2020-06-22 RX ADMIN — SEVELAMER CARBONATE 800 MILLIGRAM(S): 2400 POWDER, FOR SUSPENSION ORAL at 12:34

## 2020-06-22 RX ADMIN — Medication 81 MILLIGRAM(S): at 12:33

## 2020-06-22 RX ADMIN — SEVELAMER CARBONATE 800 MILLIGRAM(S): 2400 POWDER, FOR SUSPENSION ORAL at 08:18

## 2020-06-22 RX ADMIN — SEVELAMER CARBONATE 800 MILLIGRAM(S): 2400 POWDER, FOR SUSPENSION ORAL at 17:45

## 2020-06-22 RX ADMIN — LISINOPRIL 10 MILLIGRAM(S): 2.5 TABLET ORAL at 06:30

## 2020-06-22 RX ADMIN — CLOPIDOGREL BISULFATE 75 MILLIGRAM(S): 75 TABLET, FILM COATED ORAL at 12:33

## 2020-06-22 RX ADMIN — Medication 2 UNIT(S): at 12:33

## 2020-06-22 RX ADMIN — OXYCODONE AND ACETAMINOPHEN 1 TABLET(S): 5; 325 TABLET ORAL at 08:18

## 2020-06-22 RX ADMIN — Medication 50 MILLIGRAM(S): at 06:30

## 2020-06-22 RX ADMIN — Medication 2 PUFF(S): at 06:30

## 2020-06-22 RX ADMIN — Medication 2 UNIT(S): at 17:45

## 2020-06-22 RX ADMIN — INSULIN GLARGINE 10 UNIT(S): 100 INJECTION, SOLUTION SUBCUTANEOUS at 22:21

## 2020-06-22 NOTE — PROGRESS NOTE ADULT - SUBJECTIVE AND OBJECTIVE BOX
PULMONARY PROGRESS NOTE    NATHAN MEESK  MRN-[id number]    Patient is a 64y old  Female who presents with a chief complaint of SOB, LE pain (22 Jun 2020 12:10)      HPI:  sitting in chair  s/p HD over weekend  on room air  leg pain+     ROS:   -    ACTIVE MEDICATION LIST:  MEDICATIONS  (STANDING):  aspirin enteric coated 81 milliGRAM(s) Oral daily  atorvastatin 20 milliGRAM(s) Oral at bedtime  buDESOnide   90 MICROgram(s) Inhaler 2 Puff(s) Inhalation two times a day  clopidogrel Tablet 75 milliGRAM(s) Oral daily  dextrose 5%. 1000 milliLiter(s) (50 mL/Hr) IV Continuous <Continuous>  dextrose 50% Injectable 12.5 Gram(s) IV Push once  dextrose 50% Injectable 25 Gram(s) IV Push once  dextrose 50% Injectable 25 Gram(s) IV Push once  heparin   Injectable 5000 Unit(s) SubCutaneous every 8 hours  insulin glargine Injectable (LANTUS) 10 Unit(s) SubCutaneous at bedtime  insulin lispro (HumaLOG) corrective regimen sliding scale   SubCutaneous three times a day before meals  insulin lispro (HumaLOG) corrective regimen sliding scale   SubCutaneous at bedtime  insulin lispro Injectable (HumaLOG) 2 Unit(s) SubCutaneous three times a day before meals  lisinopril 10 milliGRAM(s) Oral daily  metoprolol succinate ER 50 milliGRAM(s) Oral daily  sevelamer carbonate 800 milliGRAM(s) Oral three times a day with meals    MEDICATIONS  (PRN):  acetaminophen   Tablet .. 650 milliGRAM(s) Oral every 6 hours PRN Temp greater or equal to 38C (100.4F), Mild Pain (1 - 3), Moderate Pain (4 - 6)  dextrose 40% Gel 15 Gram(s) Oral once PRN Blood Glucose LESS THAN 70 milliGRAM(s)/deciliter  glucagon  Injectable 1 milliGRAM(s) IntraMuscular once PRN Glucose LESS THAN 70 milligrams/deciliter      EXAM:  Vital Signs Last 24 Hrs  T(C): 36.4 (22 Jun 2020 11:00), Max: 36.8 (21 Jun 2020 21:23)  T(F): 97.6 (22 Jun 2020 11:00), Max: 98.2 (21 Jun 2020 21:23)  HR: 73 (22 Jun 2020 11:00) (66 - 81)  BP: 137/75 (22 Jun 2020 11:00) (110/56 - 148/78)  BP(mean): --  RR: 20 (22 Jun 2020 11:00) (18 - 20)  SpO2: 97% (22 Jun 2020 11:01) (97% - 100%)    GENERAL: The patient is awake and alert in no apparent distress.     LUNGS: respirations unlabored    HEART: Regular rate                              10.5   4.01  )-----------( 230      ( 22 Jun 2020 06:44 )             34.0       06-22    132<L>  |  94<L>  |  22  ----------------------------<  148<H>  4.4   |  21<L>  |  5.08<H>    Ca    9.0      22 Jun 2020 06:45     < from: CT Angio Chest w/ IV Cont (06.19.20 @ 14:49) >    EXAM:  CT ANGIO CHEST (W)AW IC                            PROCEDURE DATE:  06/19/2020            INTERPRETATION:  CLINICAL INFORMATION: Shortness of breath    COMPARISON: Chest radiograph 6/18/2020 and CT chest 12/3/2019    PROCEDURE:   CT Angiography of the Chest.  40 ml of Omnipaque 350 was injected intravenously. 60 ml were discarded.  Sagittal and coronal reformats were performed as well as 3D (MIP) reconstructions.    FINDINGS:    LUNGS AND AIRWAYS: Debris within the upper trachea.  Bibasilar subsegmental atelectasis. There are a few scattered cysts throughout the bilateral lungs. Interlobular septal thickening and groundglass opacities in the right upper lobe consistent with pulmonary edema. Redemonstration of a right upper lobe groundglass opacity measuring approximately 1.5 cm (5-47), without significant interval change compared to 12/3/2019 when allowing for differences in technique. Redemonstration of a left upper lobe groundglass opacity which measures 1.3 cm (5-31) no significant interval change when compared to 12/3/2019 when allowing for differences in technique.  PLEURA: Trace bilateral pleural effusions.  MEDIASTINUM AND SANDOVAL: Unchanged mediastinal and hilar lymphadenopathy.  VESSELS: No pulmonary emboli. Left subclavian metallic stent. Coronary and aortic calcified plaque.  HEART: Cardiomegaly. The left atrium, in particular, is enlarged. Left atrial appendage is not fully opacified. No pericardial effusion.  CHEST WALL AND LOWER NECK: Within normal limits.  VISUALIZED UPPER ABDOMEN: Atrophic left kidney compatible with ESRD. Scattered splenic calcified granulomata.  BONES: Diffuse osseous sclerosis compatible renal osteodystrophy. Chronic right posterior seventh rib deformity.    IMPRESSION:     1.  No pulmonary emboli.  2.  Pulmonary edema and small bilateral pleural effusions.  3.  No significant interval change in the size of right upper and left upper lobe groundglass opacities, likely adenocarcinoma in situ.                HOLLEY KIRBY M.D., RADIOLOGY RESIDENT  This document has been electronically signed.  BETO BISHOP M.D., ATTENDING RADIOLOGIST  This document has been electronically signed. Jun 19 2020  3:38PM              < end of copied text >      PROBLEM LIST:  64y Female with HEALTH ISSUES - PROBLEM Dx:  Shortness of breath: Shortness of breath  CAD (coronary artery disease): CAD (coronary artery disease)  Type 1 diabetes mellitus with chronic kidney disease on chronic dialysis: Type 1 diabetes mellitus with chronic kidney disease on chronic dialysis  Pain in both lower extremities: Pain in both lower extremities  Acute on chronic systolic heart failure: Acute on chronic systolic heart failure  ESRD (end stage renal disease) on dialysis: ESRD (end stage renal disease) on dialysis  Community acquired bacterial pneumonia: Community acquired bacterial pneumonia         RECS:  has history of EBUS with DR Thurston- 3/2019  status post Flex bronch, EBUS, transbronchial needle aspiration, cervical video mediastinoscopy with mediastinal lymph node biopsy and she is here for a post-op visit.   Surgery Date: 3/4/19   pathology- negative  she can f/u outpatient with Dr Thompson re: new CT findings  stable resp status  02 prn at home      Please call with any questions.    Chelle Kapoor DO  [facility] Pulmonary/Sleep Medicine  [phone]

## 2020-06-22 NOTE — PROVIDER CONTACT NOTE (OTHER) - BACKGROUND
admitted with b/l leg pain & SOB
65 y/o female presenting with b/l LE pain, SOB, and generalized fatigue. hx of ESRD on HD via LUE AVF, DM, HTN, CAD s/p stent, CVA - no residual and CHF

## 2020-06-22 NOTE — PROGRESS NOTE ADULT - ASSESSMENT
63 yo F with PMH of ESRD on HD (TTS), CAD s/p stents, CHF (EF 35%) with severe AS, T1DM, COPD, CVA with no residuals p/w acute onset SOB and LE pain. Pt was recently hospitalized for SOB persistently COVID+ during that visit now with sob and pneumonia      1- ESRD  2- CHF   3- CAD  4- HTN     hd 3 hours 2 liter 2 k bath bfr 400 dfr 600   next hd in am

## 2020-06-22 NOTE — PROGRESS NOTE ADULT - SUBJECTIVE AND OBJECTIVE BOX
[facility]   [phone]  DIALYSIS NOTE  Chief Complaint: ESRD/Ongoing hemodialysis requirement. seen earlier     24 hour events/subjective:    states breathing is better. edema is better.         ALLERGIES & MEDICATIONS  --------------------------------------------------------------------------------  Allergies    No Known Allergies    Intolerances      Standing Inpatient Medications  aspirin enteric coated 81 milliGRAM(s) Oral daily  atorvastatin 20 milliGRAM(s) Oral at bedtime  buDESOnide   90 MICROgram(s) Inhaler 2 Puff(s) Inhalation two times a day  clopidogrel Tablet 75 milliGRAM(s) Oral daily  dextrose 5%. 1000 milliLiter(s) IV Continuous <Continuous>  dextrose 50% Injectable 12.5 Gram(s) IV Push once  dextrose 50% Injectable 25 Gram(s) IV Push once  dextrose 50% Injectable 25 Gram(s) IV Push once  heparin   Injectable 5000 Unit(s) SubCutaneous every 8 hours  insulin glargine Injectable (LANTUS) 10 Unit(s) SubCutaneous at bedtime  insulin lispro (HumaLOG) corrective regimen sliding scale   SubCutaneous three times a day before meals  insulin lispro (HumaLOG) corrective regimen sliding scale   SubCutaneous at bedtime  insulin lispro Injectable (HumaLOG) 2 Unit(s) SubCutaneous three times a day before meals  lisinopril 10 milliGRAM(s) Oral daily  metoprolol succinate ER 50 milliGRAM(s) Oral daily  sevelamer carbonate 800 milliGRAM(s) Oral three times a day with meals    PRN Inpatient Medications  acetaminophen   Tablet .. 650 milliGRAM(s) Oral every 6 hours PRN  dextrose 40% Gel 15 Gram(s) Oral once PRN  glucagon  Injectable 1 milliGRAM(s) IntraMuscular once PRN      REVIEW OF SYSTEMS  --------------------------------------------------------------------------------  no itching or rash  no fever or chill  no cp or palp   no sob or cough   no N/V/D/ no abd pain   ext  + edema         VITALS/PHYSICAL EXAM  --------------------------------------------------------------------------------  T(C): 36.7 (06-22-20 @ 21:56), Max: 36.7 (06-22-20 @ 21:56)  HR: 75 (06-22-20 @ 21:56) (69 - 76)  BP: 153/85 (06-22-20 @ 21:56) (137/75 - 158/75)  RR: 18 (06-22-20 @ 21:56) (17 - 20)  SpO2: 95% (06-22-20 @ 21:56) (93% - 99%)  Wt(kg): --        06-21-20 @ 07:01  -  06-22-20 @ 07:00  --------------------------------------------------------  IN: 990 mL / OUT: 0 mL / NET: 990 mL    06-22-20 @ 07:01  -  06-22-20 @ 22:09  --------------------------------------------------------  IN: 1160 mL / OUT: 2500 mL / NET: -1340 mL      Physical Exam:  		    Gen: on O2  	-  JVD  	Pulm: decrease bs -   rales -  wheezing  	CV: RRR, S1S2; no rub  	Abd: +BS, soft, nontender/nondistended  	: No suprapubic tenderness  	UE: Warm, no cyanosis  no clubbing,  no edema; no asterixis  	LE: Warm, no cyanosis  no clubbing, 2+ + LLE with 1-2- RLE  edema  	    LABS/STUDIES  --------------------------------------------------------------------------------              10.5   4.01  >-----------<  230      [06-22-20 @ 06:44]              34.0     132  |  94  |  22  ----------------------------<  148      [06-22-20 @ 06:45]  4.4   |  21  |  5.08        Ca     9.0     [06-22-20 @ 06:45]                    imp/suggest: ESRD      Hemodialysis Prescription:  	Access:  	Dialyzer: revaclear   	Blood Flow (mL/Min): 400  	Dialysate Flow (mL/Min): 600  	Target UF (Liters):  	Treatment Time:  	Potassium:   	Calcium: 2.5  	  YOLANDA    Vitamin D     continue with hd   see hd flow sheet

## 2020-06-22 NOTE — PROGRESS NOTE ADULT - SUBJECTIVE AND OBJECTIVE BOX
CC: f/u for possible pneumonia    Patient reports: no complaints, she is comfortable on nasal O2    REVIEW OF SYSTEMS:  All other review of systems negative (Comprehensive ROS)    Antimicrobials Day #  :off    Other Medications Reviewed    T(F): 97.4 (06-22-20 @ 06:24), Max: 98.2 (06-21-20 @ 21:23)  HR: 70 (06-22-20 @ 06:24)  BP: 138/71 (06-22-20 @ 06:24)  RR: 18 (06-22-20 @ 06:24)  SpO2: 99% (06-22-20 @ 06:24)  Wt(kg): --    PHYSICAL EXAM:  General: alert, no acute distress  Eyes:  anicteric, no conjunctival injection, no discharge  Oropharynx: no lesions or injection 	  Neck: supple, without adenopathy  Lungs: clear to auscultation  Heart: regular rate and rhythm; +heraclio  Abdomen: soft, nondistended, nontender, without mass or organomegaly  Skin: no lesions  Extremities: no clubbing, cyanosis, + edema left leg  Neurologic: alert, oriented, moves all extremities    LAB RESULTS:                        10.5   4.01  )-----------( 230      ( 22 Jun 2020 06:44 )             34.0     06-22    132<L>  |  94<L>  |  22  ----------------------------<  148<H>  4.4   |  21<L>  |  5.08<H>    Ca    9.0      22 Jun 2020 06:45          MICROBIOLOGY:  RECENT CULTURES:  06-18 @ 22:19 .Blood Blood-Venous     No growth to date.      06-18 @ 22:01 .Blood Blood-Venous     No growth to date.          RADIOLOGY REVIEWED:  < from: CT Angio Chest w/ IV Cont (06.19.20 @ 14:49) >  IMPRESSION:     1.  No pulmonary emboli.  2.  Pulmonary edema and small bilateral pleural effusions.  3.  No significant interval change in the size of right upper and left upper lobe groundglass opacities, likely adenocarcinoma in situ.    < end of copied text >

## 2020-06-22 NOTE — DISCHARGE NOTE PROVIDER - HOSPITAL COURSE
64F with PMH of ESRD on HD (TTS), CAD s/p stents, CHF (EF 35%) with severe AS, T1DM, COPD, CVA with no residuals p/w acute onset SOB and LE pain, admitted for RLL PNA and volume overload in setting of missed HD          Community acquired bacterial pneumonia.     - pt with SOB and cough, with CXR showing new RLL opacity c/w PNA    - observe off antibiotics     - f/u cultures    - monitor fever curve.     - pulmonary follow    - ID evaluation        ESRD (end stage renal disease) on dialysis.    - ESRD on HD, missed HD session earlier today    - Nephrology follow.         Acute on chronic systolic heart failure.     - SOB, elevated BNP and lower extremity edema c/w likely HF exacerbation, with last EF 35% and severe AS     - unable to diurese 2/2 anuria     - fluid removal via HD per renal     - monitor I/Os, daily weights    - monitor electrolytes     - c/w BB and ACEi.    - cardiology follow         Pain in both lower extremities.    - acute on chronic b/l LE pain, L>R; currently improved in ED without intervention - likely in setting of PAD. Also with significant swelling in LEs    - US dopplers to r/o DVT     - fluid removal with HD as above     - tylenol for pain control    - outpt vascular f/u.         Type 1 diabetes mellitus with chronic kidney disease on chronic dialysis.     - c/w lantus 10u qhs, 2u premeals TID     - low sliding scale insulin    - FS ac and hs.         CAD (coronary artery disease).    - c/w ASA and plavix.    - cardiology follow 64F with PMH of ESRD on HD (TTS), CAD s/p stents, CHF (EF 35%) with severe AS, T1DM, COPD, CVA with no residuals p/w acute onset SOB and LE pain, admitted for RLL PNA and volume overload in setting of missed HD.     Initially felt to have pneumonia, CTA showed pulmonary edema. Initially treated wtih CTX and azithro stopped 6/21. Covid in May, now likely just non viable RNA on PCR. Monitored off ABX.     Pulm edema on CTA, missed HD on the day of admission, elevated BNP. Fluid removal done via HD.     For type 1 DM, home insulin regimen continued. cardiology eval for CHF and CAD. ASA and Plavix continued    s/p leg doppler 64F with PMH of ESRD on HD (TTS), CAD s/p stents, CHF (EF 35%) with severe AS, T1DM, COPD, CVA with no residuals p/w acute onset SOB and LE pain, admitted for RLL PNA and volume overload in setting of missed HD.     Initially felt to have pneumonia, CTA showed pulmonary edema. Initially treated wtih CTX and azithro stopped 6/21. Covid in May, now likely just non viable RNA on PCR. Monitored off ABX.     Pulm edema on CTA, missed HD on the day of admission, elevated BNP. Fluid removal done via HD.     For type 1 DM, home insulin regimen continued. cardiology eval for CHF and CAD. ASA and Plavix continued    s/p leg doppler LE - no DVT

## 2020-06-22 NOTE — DISCHARGE NOTE PROVIDER - NSDCMRMEDTOKEN_GEN_ALL_CORE_FT
aspirin 81 mg oral delayed release tablet: 1 tab(s) orally once a day  atorvastatin 20 mg oral tablet: 1 tab(s) orally once a day  Basaglar KwikPen 100 units/mL subcutaneous solution: 10 unit(s) subcutaneous once a day (at bedtime)  budesonide 0.5 mg/2 mL inhalation suspension: 2 milliliter(s) inhaled 2 times a day, As Needed  clopidogrel 75 mg oral tablet: 1 tab(s) orally once a day  lisinopril 10 mg oral tablet: 1 tab(s) orally once a day   NovoLOG FlexPen 100 units/mL injectable solution: 2 unit(s) subcutaneous 3 times a day (before meals), As Needed  sevelamer carbonate 800 mg oral tablet: 3 tab(s) orally 3 times a day (with meals)  Toprol-XL 50 mg oral tablet, extended release: 1 tab(s) orally once a day (at bedtime)  Veltassa 8.4 g oral powder for reconstitution: 8.4 gram(s) orally once a day  Note: pt noncompliant aspirin 81 mg oral delayed release tablet: 1 tab(s) orally once a day  atorvastatin 20 mg oral tablet: 1 tab(s) orally once a day  Basaglar KwikPen 100 units/mL subcutaneous solution: 10 unit(s) subcutaneous once a day (at bedtime)  budesonide 0.5 mg/2 mL inhalation suspension: 2 milliliter(s) inhaled 2 times a day, As Needed  clopidogrel 75 mg oral tablet: 1 tab(s) orally once a day  lisinopril 10 mg oral tablet: 1 tab(s) orally once a day   NovoLOG FlexPen 100 units/mL injectable solution: 2 unit(s) subcutaneous 3 times a day (before meals)  sevelamer carbonate 800 mg oral tablet: 3 tab(s) orally 3 times a day (with meals)  Toprol-XL 50 mg oral tablet, extended release: 1 tab(s) orally once a day (at bedtime)  Veltassa 8.4 g oral powder for reconstitution: 8.4 gram(s) orally once a day  Note: pt noncompliant

## 2020-06-22 NOTE — PROGRESS NOTE ADULT - SUBJECTIVE AND OBJECTIVE BOX
Patient is a 64y old  Female who presents with a chief complaint of SOB, LE pain (22 Jun 2020 13:04)      SUBJECTIVE / OVERNIGHT EVENTS: No new complaints.   Review of Systems  chest pain no  palpitations no  sob no  nausea no  headache no    MEDICATIONS  (STANDING):  aspirin enteric coated 81 milliGRAM(s) Oral daily  atorvastatin 20 milliGRAM(s) Oral at bedtime  buDESOnide   90 MICROgram(s) Inhaler 2 Puff(s) Inhalation two times a day  clopidogrel Tablet 75 milliGRAM(s) Oral daily  dextrose 5%. 1000 milliLiter(s) (50 mL/Hr) IV Continuous <Continuous>  dextrose 50% Injectable 12.5 Gram(s) IV Push once  dextrose 50% Injectable 25 Gram(s) IV Push once  dextrose 50% Injectable 25 Gram(s) IV Push once  heparin   Injectable 5000 Unit(s) SubCutaneous every 8 hours  insulin glargine Injectable (LANTUS) 10 Unit(s) SubCutaneous at bedtime  insulin lispro (HumaLOG) corrective regimen sliding scale   SubCutaneous three times a day before meals  insulin lispro (HumaLOG) corrective regimen sliding scale   SubCutaneous at bedtime  insulin lispro Injectable (HumaLOG) 2 Unit(s) SubCutaneous three times a day before meals  lisinopril 10 milliGRAM(s) Oral daily  metoprolol succinate ER 50 milliGRAM(s) Oral daily  sevelamer carbonate 800 milliGRAM(s) Oral three times a day with meals    MEDICATIONS  (PRN):  acetaminophen   Tablet .. 650 milliGRAM(s) Oral every 6 hours PRN Temp greater or equal to 38C (100.4F), Mild Pain (1 - 3), Moderate Pain (4 - 6)  dextrose 40% Gel 15 Gram(s) Oral once PRN Blood Glucose LESS THAN 70 milliGRAM(s)/deciliter  glucagon  Injectable 1 milliGRAM(s) IntraMuscular once PRN Glucose LESS THAN 70 milligrams/deciliter      Vital Signs Last 24 Hrs  T(C): 36.4 (22 Jun 2020 18:10), Max: 36.8 (21 Jun 2020 21:23)  T(F): 97.5 (22 Jun 2020 18:10), Max: 98.2 (21 Jun 2020 21:23)  HR: 76 (22 Jun 2020 18:10) (69 - 81)  BP: 144/78 (22 Jun 2020 18:10) (137/75 - 148/78)  BP(mean): --  RR: 17 (22 Jun 2020 18:10) (17 - 20)  SpO2: 93% (22 Jun 2020 18:10) (93% - 100%)    PHYSICAL EXAM:  GENERAL: NAD, well-developed  HEAD:  Atraumatic, Normocephalic  EYES: EOMI, PERRLA, conjunctiva and sclera clear  NECK: Supple, No JVD  CHEST/LUNG: Clear to auscultation bilaterally; No wheeze  HEART: Regular rate and rhythm; No murmurs, rubs, or gallops  ABDOMEN: Soft, Nontender, Nondistended; Bowel sounds present  EXTREMITIES:  2+bl legs edema  PSYCH: AAOx3  NEUROLOGY: non-focal  SKIN: No rashes or lesions    LABS:                        10.5   4.01  )-----------( 230      ( 22 Jun 2020 06:44 )             34.0     06-22    132<L>  |  94<L>  |  22  ----------------------------<  148<H>  4.4   |  21<L>  |  5.08<H>    Ca    9.0      22 Jun 2020 06:45                  RADIOLOGY & ADDITIONAL TESTS:    Imaging Personally Reviewed:  < from: [facility] Duplex Lower Ext Vein ScanVictoriano (06.22.20 @ 17:01) >  IMPRESSION:   No evidence of deep venous thrombosis in either lower extremity.    < end of copied text >    Consultant(s) Notes Reviewed:      Care Discussed with Consultants/Other Providers:

## 2020-06-22 NOTE — DISCHARGE NOTE PROVIDER - CARE PROVIDER_API CALL
Braden Thompson  INTERNAL MEDICINE  [address]  [place] [address]  Phone: [phone]  Fax: [phone]  Follow Up Time:     Julián Biswas  INTERNAL MEDICINE  [address] [address]  Phone: [phone]  Fax: [phone]  Follow Up Time:     Arsalan Castor  INTERNAL MEDICINE  [address] [place] [address]  Phone: [phone]  Fax: [phone]  Follow Up Time: 1 week

## 2020-06-22 NOTE — PROGRESS NOTE ADULT - ASSESSMENT
64F with PMH of ESRD on HD (TTS), CAD s/p stents, CHF (EF 35%) with severe AS, T1DM, COPD, CVA with no residuals p/w acute onset SOB and LE pain, admitted for RLL PNA and volume overload in setting of missed HD      Community acquired bacterial pneumonia.   - pt with SOB and cough, with CXR showing new RLL opacity c/w PNA  - observe off antibiotics   - f/u cultures  - monitor fever curve.   - pulmonary follow  - ID evaluation notyed    ESRD (end stage renal disease) on dialysis.  - ESRD on HD, missed HD session earlier today  - Nephrology follow.     Acute on chronic systolic heart failure.   - SOB, elevated BNP and lower extremity edema c/w likely HF exacerbation, with last EF 35% and severe AS   - unable to diurese 2/2 anuria   - fluid removal via HD per renal   - monitor I/Os, daily weights  - monitor electrolytes   - c/w BB and ACEi.  - cardiology follow     Pain in both lower extremities.  - acute on chronic b/l LE pain, L>R; currently improved in ED without intervention - likely in setting of PAD. Also with significant swelling in LEs  - fluid removal with HD as above   - tylenol for pain control  - outpt vascular f/u.     Type 1 diabetes mellitus with chronic kidney disease on chronic dialysis.   - c/w lantus 10u qhs, 2u premeals TID   - low sliding scale insulin  - FS ac and hs.     CAD (coronary artery disease).  - c/w ASA and plavix.  - cardiology follow    DCP home after HD in am.    Pierce Viera MD pager [phone]

## 2020-06-22 NOTE — DISCHARGE NOTE PROVIDER - NSDCCPCAREPLAN_GEN_ALL_CORE_FT
PRINCIPAL DISCHARGE DIAGNOSIS  Diagnosis: Community acquired bacterial pneumonia  Assessment and Plan of Treatment: Treated with antibiotic then monitored off Antibiotic  CTA of chest showed pulmonary edema   Evaluated by infectious disease doctor  Respiratoy symptoms improved after HD  Please follow up with your primray care physician in one week         SECONDARY DISCHARGE DIAGNOSES  Diagnosis: Acute on chronic systolic heart failure  Assessment and Plan of Treatment: Weigh yourself daily.  If you gain 3lbs in 3 days, or 5lbs in a week call your Health Care Provider.  Do not eat or drink foods containing more than 2000mg of salt (sodium) in your diet every day.  Call your Health Care Provider if you have any swelling or increased swelling in your feet, ankles, and/or stomach.  The Pt was provided with CHF diet instruction (low sodium diet, daily weights, label reading, Heart Healthy Cooking Tips & Heart Healthy shopping Tips).  Take all of your medication as directed.  If you become dizzy call your Health Care Provider.    Diagnosis: ESRD (end stage renal disease) on dialysis  Assessment and Plan of Treatment: Continue HD as scheduled    Diagnosis: Pain in both lower extremities  Assessment and Plan of Treatment: s/p Leg doppler    Diagnosis: Abnormal CT scan, lung  Assessment and Plan of Treatment: CTA of chest  showed No pulmonary emboli, Pulmonary edema and small bilateral pleural effusions, No significant interval change in the size of right upper and left upper lobe groundglass opacities, likely adenocarcinoma in situ.  Please follow up with your pulmonary Dr. Thompson in one to two weeks       Diagnosis: CAD (coronary artery disease)  Assessment and Plan of Treatment: Please continue aspirin and Plavix   follow up with your cardiologist    Diagnosis: Type 1 diabetes mellitus with chronic kidney disease on chronic dialysis  Assessment and Plan of Treatment: Continue insulin current regimen  Follow up with your endocrinologist

## 2020-06-22 NOTE — DISCHARGE NOTE PROVIDER - INSTRUCTIONS
Low salt, low cholseterol, consistent carbohydrate diet Low salt, low cholesterol, consistent carbohydrate diet

## 2020-06-22 NOTE — PROGRESS NOTE ADULT - SUBJECTIVE AND OBJECTIVE BOX
Cardiovascular Disease Progress Note    Overnight events: No acute events overnight.  no cp/sob/palps/dizziness/leg pain stable  Otherwise review of systems negative    Objective Findings:  T(C): 36.3 (06-22-20 @ 06:24), Max: 36.8 (06-21-20 @ 21:23)  HR: 70 (06-22-20 @ 06:24) (66 - 81)  BP: 138/71 (06-22-20 @ 06:24) (110/56 - 148/78)  RR: 18 (06-22-20 @ 06:24) (18 - 18)  SpO2: 99% (06-22-20 @ 06:24) (97% - 100%)  Wt(kg): --  Daily     Daily       Physical Exam:  Gen: NAD  HEENT: EOMI  CV: RRR, normal S1 + S2, no m/r/g  Lungs: CTAB  Abd: soft, non-tender  Ext: + edema        Laboratory Data:                        9.8    4.12  )-----------( 201      ( 21 Jun 2020 06:26 )             31.8     06-22    132<L>  |  94<L>  |  22  ----------------------------<  148<H>  4.4   |  21<L>  |  5.08<H>    Ca    9.0      22 Jun 2020 06:45                Inpatient Medications:  MEDICATIONS  (STANDING):  aspirin enteric coated 81 milliGRAM(s) Oral daily  atorvastatin 20 milliGRAM(s) Oral at bedtime  buDESOnide   90 MICROgram(s) Inhaler 2 Puff(s) Inhalation two times a day  clopidogrel Tablet 75 milliGRAM(s) Oral daily  dextrose 5%. 1000 milliLiter(s) (50 mL/Hr) IV Continuous <Continuous>  dextrose 50% Injectable 12.5 Gram(s) IV Push once  dextrose 50% Injectable 25 Gram(s) IV Push once  dextrose 50% Injectable 25 Gram(s) IV Push once  heparin   Injectable 5000 Unit(s) SubCutaneous every 8 hours  insulin glargine Injectable (LANTUS) 10 Unit(s) SubCutaneous at bedtime  insulin lispro (HumaLOG) corrective regimen sliding scale   SubCutaneous three times a day before meals  insulin lispro (HumaLOG) corrective regimen sliding scale   SubCutaneous at bedtime  insulin lispro Injectable (HumaLOG) 2 Unit(s) SubCutaneous three times a day before meals  lisinopril 10 milliGRAM(s) Oral daily  metoprolol succinate ER 50 milliGRAM(s) Oral daily  oxycodone    5 mG/acetaminophen 325 mG 1 Tablet(s) Oral once  sevelamer carbonate 800 milliGRAM(s) Oral three times a day with meals      Assessment:  -esrhd on hd  -ischemic cardiomyopathy c/b mod to severe LV dysfunction, cad s/p multiple stents  -severe PAD s/p prior interventions  -s/p novel coronavirus/sars-cov2 infection 4/7/20  -copd       Recs:  -optimization of vol status with hd  -left leg swelling likely combination of hypervolemia, venous insufficiency and PAD. not a candidate for pletal due to chf, should we consider trental?  -c/w metop and lisinopril for gdmt for lv dysfunction; NSVT and pAT  -cw dapt and statin for cad/stents, pad  -dvt ppx        Over 25 minutes spent on total encounter; more than 50% of the visit was spent counseling and/or coordinating care by the attending physician.      Julián Biswas MD   Cardiovascular Disease  [phone]

## 2020-06-22 NOTE — DISCHARGE NOTE PROVIDER - CARE PROVIDERS DIRECT ADDRESSES
[email or web address],DirectAddress_Unknown,DirectAddress_Unknown

## 2020-06-22 NOTE — PROGRESS NOTE ADULT - ASSESSMENT
65 yo female with COPD,DM,ICM, AS, B/L LE bypass, and recent COVID admitted with edema and dyspnea.  Initially felt to have pneumonia, CTA showed pulmonary edema.  CTX and azithro stopped 6/21  Covid in May, now likely just non viable RNA on PCR  Monitor off antibiotics, no signs of ongoing infection.

## 2020-06-22 NOTE — PROVIDER CONTACT NOTE (OTHER) - ASSESSMENT
Pt A&Ox4. vss. b/l lower extremity edema noted. Pt ambulating independently in room. pt educated on importance of medication, but still refusing at this time.

## 2020-06-22 NOTE — DISCHARGE NOTE PROVIDER - NSDCFUADDINST_GEN_ALL_CORE_FT
Make appointments to follow up with your out patient physicians.  Bring all discharge paperwork including discharge medication list to your follow up appointments. Make appointments to follow up with your out patient physicians.  Bring all discharge paperwork including discharge medication list to your follow up appointments.  resume dialysis schedule

## 2020-06-22 NOTE — DISCHARGE NOTE PROVIDER - PROVIDER TOKENS
PROVIDER:[TOKEN:[[id number]]],PROVIDER:[TOKEN:[[id number]]],PROVIDER:[TOKEN:[[id number]],FOLLOWUP:[1 week]]

## 2020-06-23 ENCOUNTER — TRANSCRIPTION ENCOUNTER (OUTPATIENT)
Age: 65
End: 2020-06-23

## 2020-06-23 VITALS
DIASTOLIC BLOOD PRESSURE: 75 MMHG | OXYGEN SATURATION: 100 % | RESPIRATION RATE: 18 BRPM | HEART RATE: 74 BPM | SYSTOLIC BLOOD PRESSURE: 158 MMHG | WEIGHT: 133.38 LBS | TEMPERATURE: 98 F

## 2020-06-23 LAB
ANION GAP SERPL CALC-SCNC: 12 MMOL/L — SIGNIFICANT CHANGE UP (ref 5–17)
BUN SERPL-MCNC: 15 MG/DL — SIGNIFICANT CHANGE UP (ref 7–23)
CALCIUM SERPL-MCNC: 9 MG/DL — SIGNIFICANT CHANGE UP (ref 8.4–10.5)
CHLORIDE SERPL-SCNC: 94 MMOL/L — LOW (ref 96–108)
CO2 SERPL-SCNC: 24 MMOL/L — SIGNIFICANT CHANGE UP (ref 22–31)
CREAT SERPL-MCNC: 3.67 MG/DL — HIGH (ref 0.5–1.3)
CULTURE RESULTS: SIGNIFICANT CHANGE UP
CULTURE RESULTS: SIGNIFICANT CHANGE UP
GLUCOSE BLDC GLUCOMTR-MCNC: 140 MG/DL — HIGH (ref 70–99)
GLUCOSE BLDC GLUCOMTR-MCNC: 184 MG/DL — HIGH (ref 70–99)
GLUCOSE SERPL-MCNC: 187 MG/DL — HIGH (ref 70–99)
MAGNESIUM SERPL-MCNC: 2.2 MG/DL — SIGNIFICANT CHANGE UP (ref 1.6–2.6)
POTASSIUM SERPL-MCNC: 3.9 MMOL/L — SIGNIFICANT CHANGE UP (ref 3.5–5.3)
POTASSIUM SERPL-SCNC: 3.9 MMOL/L — SIGNIFICANT CHANGE UP (ref 3.5–5.3)
SODIUM SERPL-SCNC: 130 MMOL/L — LOW (ref 135–145)
SPECIMEN SOURCE: SIGNIFICANT CHANGE UP
SPECIMEN SOURCE: SIGNIFICANT CHANGE UP

## 2020-06-23 PROCEDURE — 99285 EMERGENCY DEPT VISIT HI MDM: CPT

## 2020-06-23 PROCEDURE — 94640 AIRWAY INHALATION TREATMENT: CPT

## 2020-06-23 PROCEDURE — 84295 ASSAY OF SERUM SODIUM: CPT

## 2020-06-23 PROCEDURE — 82435 ASSAY OF BLOOD CHLORIDE: CPT

## 2020-06-23 PROCEDURE — 85014 HEMATOCRIT: CPT

## 2020-06-23 PROCEDURE — 80053 COMPREHEN METABOLIC PANEL: CPT

## 2020-06-23 PROCEDURE — 85730 THROMBOPLASTIN TIME PARTIAL: CPT

## 2020-06-23 PROCEDURE — 82728 ASSAY OF FERRITIN: CPT

## 2020-06-23 PROCEDURE — 86140 C-REACTIVE PROTEIN: CPT

## 2020-06-23 PROCEDURE — 36415 COLL VENOUS BLD VENIPUNCTURE: CPT

## 2020-06-23 PROCEDURE — 84132 ASSAY OF SERUM POTASSIUM: CPT

## 2020-06-23 PROCEDURE — 93970 EXTREMITY STUDY: CPT

## 2020-06-23 PROCEDURE — 83880 ASSAY OF NATRIURETIC PEPTIDE: CPT

## 2020-06-23 PROCEDURE — 83605 ASSAY OF LACTIC ACID: CPT

## 2020-06-23 PROCEDURE — 84145 PROCALCITONIN (PCT): CPT

## 2020-06-23 PROCEDURE — 82947 ASSAY GLUCOSE BLOOD QUANT: CPT

## 2020-06-23 PROCEDURE — 99261: CPT

## 2020-06-23 PROCEDURE — 99231 SBSQ HOSP IP/OBS SF/LOW 25: CPT

## 2020-06-23 PROCEDURE — 87040 BLOOD CULTURE FOR BACTERIA: CPT

## 2020-06-23 PROCEDURE — 71275 CT ANGIOGRAPHY CHEST: CPT

## 2020-06-23 PROCEDURE — 85610 PROTHROMBIN TIME: CPT

## 2020-06-23 PROCEDURE — 85379 FIBRIN DEGRADATION QUANT: CPT

## 2020-06-23 PROCEDURE — 85027 COMPLETE CBC AUTOMATED: CPT

## 2020-06-23 PROCEDURE — 82962 GLUCOSE BLOOD TEST: CPT

## 2020-06-23 PROCEDURE — 82803 BLOOD GASES ANY COMBINATION: CPT

## 2020-06-23 PROCEDURE — 93005 ELECTROCARDIOGRAM TRACING: CPT

## 2020-06-23 PROCEDURE — 83036 HEMOGLOBIN GLYCOSYLATED A1C: CPT

## 2020-06-23 PROCEDURE — 80048 BASIC METABOLIC PNL TOTAL CA: CPT

## 2020-06-23 PROCEDURE — 83735 ASSAY OF MAGNESIUM: CPT

## 2020-06-23 PROCEDURE — 82330 ASSAY OF CALCIUM: CPT

## 2020-06-23 PROCEDURE — 71045 X-RAY EXAM CHEST 1 VIEW: CPT

## 2020-06-23 RX ORDER — OXYCODONE AND ACETAMINOPHEN 5; 325 MG/1; MG/1
1 TABLET ORAL EVERY 4 HOURS
Refills: 0 | Status: DISCONTINUED | OUTPATIENT
Start: 2020-06-23 | End: 2020-06-23

## 2020-06-23 RX ORDER — OXYCODONE AND ACETAMINOPHEN 5; 325 MG/1; MG/1
1 TABLET ORAL ONCE
Refills: 0 | Status: DISCONTINUED | OUTPATIENT
Start: 2020-06-23 | End: 2020-06-23

## 2020-06-23 RX ADMIN — Medication 2 UNIT(S): at 08:28

## 2020-06-23 RX ADMIN — OXYCODONE AND ACETAMINOPHEN 1 TABLET(S): 5; 325 TABLET ORAL at 16:14

## 2020-06-23 RX ADMIN — Medication 2 PUFF(S): at 04:34

## 2020-06-23 RX ADMIN — OXYCODONE AND ACETAMINOPHEN 1 TABLET(S): 5; 325 TABLET ORAL at 06:58

## 2020-06-23 RX ADMIN — SEVELAMER CARBONATE 800 MILLIGRAM(S): 2400 POWDER, FOR SUSPENSION ORAL at 08:31

## 2020-06-23 RX ADMIN — SEVELAMER CARBONATE 800 MILLIGRAM(S): 2400 POWDER, FOR SUSPENSION ORAL at 12:36

## 2020-06-23 RX ADMIN — Medication 81 MILLIGRAM(S): at 12:35

## 2020-06-23 RX ADMIN — Medication 1: at 08:29

## 2020-06-23 RX ADMIN — CLOPIDOGREL BISULFATE 75 MILLIGRAM(S): 75 TABLET, FILM COATED ORAL at 12:35

## 2020-06-23 RX ADMIN — Medication 2 UNIT(S): at 12:35

## 2020-06-23 NOTE — PROGRESS NOTE ADULT - ASSESSMENT
65 yo female with COPD,DM,ICM, AS, B/L LE bypass, and recent COVID admitted with edema and dyspnea.  Initially felt to have pneumonia, CTA showed pulmonary edema.  CTX and azithro stopped 6/21  Covid in May, now likely just non viable RNA on PCR.  Admission blood cultures have been negative  She appears at baseline  Monitor off antibiotics, no signs of ongoing infection.  Discharge being planned.With no additional ID w/u planned we will stop actively following, please call if ID issues  arise.

## 2020-06-23 NOTE — PROGRESS NOTE ADULT - SUBJECTIVE AND OBJECTIVE BOX
PULMONARY PROGRESS NOTE    NATHAN MEEKS  MRN-[id number]    Patient is a 64y old  Female who presents with a chief complaint of SOB, LE pain (23 Jun 2020 10:28)      HPI:  on room air  getting HD    ROS:   -    ACTIVE MEDICATION LIST:  MEDICATIONS  (STANDING):  aspirin enteric coated 81 milliGRAM(s) Oral daily  atorvastatin 20 milliGRAM(s) Oral at bedtime  buDESOnide   90 MICROgram(s) Inhaler 2 Puff(s) Inhalation two times a day  clopidogrel Tablet 75 milliGRAM(s) Oral daily  dextrose 5%. 1000 milliLiter(s) (50 mL/Hr) IV Continuous <Continuous>  dextrose 50% Injectable 12.5 Gram(s) IV Push once  dextrose 50% Injectable 25 Gram(s) IV Push once  dextrose 50% Injectable 25 Gram(s) IV Push once  heparin   Injectable 5000 Unit(s) SubCutaneous every 8 hours  insulin glargine Injectable (LANTUS) 10 Unit(s) SubCutaneous at bedtime  insulin lispro (HumaLOG) corrective regimen sliding scale   SubCutaneous three times a day before meals  insulin lispro (HumaLOG) corrective regimen sliding scale   SubCutaneous at bedtime  insulin lispro Injectable (HumaLOG) 2 Unit(s) SubCutaneous three times a day before meals  lisinopril 10 milliGRAM(s) Oral daily  metoprolol succinate ER 50 milliGRAM(s) Oral daily  sevelamer carbonate 800 milliGRAM(s) Oral three times a day with meals    MEDICATIONS  (PRN):  acetaminophen   Tablet .. 650 milliGRAM(s) Oral every 6 hours PRN Temp greater or equal to 38C (100.4F), Mild Pain (1 - 3), Moderate Pain (4 - 6)  dextrose 40% Gel 15 Gram(s) Oral once PRN Blood Glucose LESS THAN 70 milliGRAM(s)/deciliter  glucagon  Injectable 1 milliGRAM(s) IntraMuscular once PRN Glucose LESS THAN 70 milligrams/deciliter      EXAM:  Vital Signs Last 24 Hrs  T(C): 36.8 (23 Jun 2020 03:52), Max: 36.8 (22 Jun 2020 23:22)  T(F): 98.3 (23 Jun 2020 03:52), Max: 98.3 (23 Jun 2020 03:52)  HR: 75 (23 Jun 2020 08:34) (69 - 77)  BP: 162/67 (23 Jun 2020 08:34) (143/79 - 162/67)  BP(mean): --  RR: 18 (23 Jun 2020 08:34) (17 - 18)  SpO2: 94% (23 Jun 2020 08:34) (93% - 100%)    GENERAL: The patient is awake and alert in no apparent distress.     LUNGS: Clear to auscultation without wheezing, rales or rhonchi; respirations unlabored    HEART: Regular rate and rhythm without murmur.                            10.5   4.01  )-----------( 230      ( 22 Jun 2020 06:44 )             34.0       06-23    130<L>  |  94<L>  |  15  ----------------------------<  187<H>  3.9   |  24  |  3.67<H>    Ca    9.0      23 Jun 2020 05:30  Mg     2.2     06-23      < from: CT Angio Chest w/ IV Cont (06.19.20 @ 14:49) >    EXAM:  CT ANGIO CHEST (W)AW IC                            PROCEDURE DATE:  06/19/2020            INTERPRETATION:  CLINICAL INFORMATION: Shortness of breath    COMPARISON: Chest radiograph 6/18/2020 and CT chest 12/3/2019    PROCEDURE:   CT Angiography of the Chest.  40 ml of Omnipaque 350 was injected intravenously. 60 ml were discarded.  Sagittal and coronal reformats were performed as well as 3D (MIP) reconstructions.    FINDINGS:    LUNGS AND AIRWAYS: Debris within the upper trachea.  Bibasilar subsegmental atelectasis. There are a few scattered cysts throughout the bilateral lungs. Interlobular septal thickening and groundglass opacities in the right upper lobe consistent with pulmonary edema. Redemonstration of a right upper lobe groundglass opacity measuring approximately 1.5 cm (5-47), without significant interval change compared to 12/3/2019 when allowing for differences in technique. Redemonstration of a left upper lobe groundglass opacity which measures 1.3 cm (5-31) no significant interval change when compared to 12/3/2019 when allowing for differences in technique.  PLEURA: Trace bilateral pleural effusions.  MEDIASTINUM AND SANDOVAL: Unchanged mediastinal and hilar lymphadenopathy.  VESSELS: No pulmonary emboli. Left subclavian metallic stent. Coronary and aortic calcified plaque.  HEART: Cardiomegaly. The left atrium, in particular, is enlarged. Left atrial appendage is not fully opacified. No pericardial effusion.  CHEST WALL AND LOWER NECK: Within normal limits.  VISUALIZED UPPER ABDOMEN: Atrophic left kidney compatible with ESRD. Scattered splenic calcified granulomata.  BONES: Diffuse osseous sclerosis compatible renal osteodystrophy. Chronic right posterior seventh rib deformity.    IMPRESSION:     1.  No pulmonary emboli.  2.  Pulmonary edema and small bilateral pleural effusions.  3.  No significant interval change in the size of right upper and left upper lobe groundglass opacities, likely adenocarcinoma in situ.                HOLLEY KIRBY M.D., RADIOLOGY RESIDENT    < end of copied text >      PROBLEM LIST:  64y Female with HEALTH ISSUES - PROBLEM Dx:  Shortness of breath: Shortness of breath  CAD (coronary artery disease): CAD (coronary artery disease)  Type 1 diabetes mellitus with chronic kidney disease on chronic dialysis: Type 1 diabetes mellitus with chronic kidney disease on chronic dialysis  Pain in both lower extremities: Pain in both lower extremities  Acute on chronic systolic heart failure: Acute on chronic systolic heart failure  ESRD (end stage renal disease) on dialysis: ESRD (end stage renal disease) on dialysis  Community acquired bacterial pneumonia: Community acquired bacterial pneumonia            RECS:  stable pulm status  outpatient f/u on her CT results with Dr Jay VALDEZ      Please call with any questions.    Chelle Kapoor DO  [facility] Pulmonary/Sleep Medicine  [phone]

## 2020-06-23 NOTE — PROGRESS NOTE ADULT - REASON FOR ADMISSION
SOB, LE pain

## 2020-06-23 NOTE — PROGRESS NOTE ADULT - SUBJECTIVE AND OBJECTIVE BOX
CC: f/u for pneumonia    Patient reports: no complaints, she is comfortable on RA    REVIEW OF SYSTEMS:  All other review of systems negative (Comprehensive ROS)    Antimicrobials Day #  :    Other Medications Reviewed    T(F): 98.3 (06-23-20 @ 03:52), Max: 98.3 (06-23-20 @ 03:52)  HR: 75 (06-23-20 @ 08:34)  BP: 162/67 (06-23-20 @ 08:34)  RR: 18 (06-23-20 @ 08:34)  SpO2: 94% (06-23-20 @ 08:34)  Wt(kg): --    PHYSICAL EXAM:  General: alert, no acute distress  Eyes:  anicteric, no conjunctival injection, no discharge  Oropharynx: no lesions or injection 	  Neck: supple, without adenopathy  Lungs: clear to auscultation  Heart: regular rate and rhythm; no murmur, rubs or gallops  Abdomen: soft, nondistended, nontender, without mass or organomegaly  Skin: no lesions  Extremities: no clubbing, cyanosis, + edema left ;leg  Neurologic: alert, oriented, moves all extremities    LAB RESULTS:                        10.5   4.01  )-----------( 230      ( 22 Jun 2020 06:44 )             34.0     06-23    130<L>  |  94<L>  |  15  ----------------------------<  187<H>  3.9   |  24  |  3.67<H>    Ca    9.0      23 Jun 2020 05:30  Mg     2.2     06-23          MICROBIOLOGY:  RECENT CULTURES:  06-18 @ 22:19 .Blood Blood-Venous     No growth to date.      06-18 @ 22:01 .Blood Blood-Venous     No growth to date.          RADIOLOGY REVIEWED:  < from: VA Duplex Lower Ext Vein Scan, Victoriano (06.22.20 @ 17:01) >  IMPRESSION:   No evidence of deep venous thrombosis in either lower extremity.

## 2020-06-23 NOTE — DISCHARGE NOTE NURSING/CASE MANAGEMENT/SOCIAL WORK - PATIENT PORTAL LINK FT
You can access the FollowMyHealth Patient Portal offered by [facility] by registering at the following website: [email or web address]. By joining [facility]’s FollowMyHealth portal, you will also be able to view your health information using other applications (apps) compatible with our system.

## 2020-06-23 NOTE — PROGRESS NOTE ADULT - ASSESSMENT
63 yo F with PMH of ESRD on HD (TTS), CAD s/p stents, CHF (EF 35%) with severe AS, T1DM, COPD, CVA with no residuals p/w acute onset SOB and LE pain. Pt was recently hospitalized for SOB persistently COVID+ during that visit now with sob and pneumonia      1- ESRD  2- CHF   3- CAD  4- HTN     hd 210 min f 160  2 liter 3 k bath bfr 400 dfr 600

## 2020-06-23 NOTE — PROGRESS NOTE ADULT - SUBJECTIVE AND OBJECTIVE BOX
[facility]   [phone]  DIALYSIS NOTE  Chief Complaint: ESRD/Ongoing hemodialysis requirement.     24 hour events/subjective:    seen earlier. states breathing is better and baseline         ALLERGIES & MEDICATIONS  --------------------------------------------------------------------------------  Allergies    No Known Allergies    Intolerances      Standing Inpatient Medications  aspirin enteric coated 81 milliGRAM(s) Oral daily  atorvastatin 20 milliGRAM(s) Oral at bedtime  buDESOnide   90 MICROgram(s) Inhaler 2 Puff(s) Inhalation two times a day  clopidogrel Tablet 75 milliGRAM(s) Oral daily  dextrose 5%. 1000 milliLiter(s) IV Continuous <Continuous>  dextrose 50% Injectable 12.5 Gram(s) IV Push once  dextrose 50% Injectable 25 Gram(s) IV Push once  dextrose 50% Injectable 25 Gram(s) IV Push once  heparin   Injectable 5000 Unit(s) SubCutaneous every 8 hours  insulin glargine Injectable (LANTUS) 10 Unit(s) SubCutaneous at bedtime  insulin lispro (HumaLOG) corrective regimen sliding scale   SubCutaneous three times a day before meals  insulin lispro (HumaLOG) corrective regimen sliding scale   SubCutaneous at bedtime  insulin lispro Injectable (HumaLOG) 2 Unit(s) SubCutaneous three times a day before meals  lisinopril 10 milliGRAM(s) Oral daily  metoprolol succinate ER 50 milliGRAM(s) Oral daily  oxycodone    5 mG/acetaminophen 325 mG 1 Tablet(s) Oral every 4 hours  sevelamer carbonate 800 milliGRAM(s) Oral three times a day with meals    PRN Inpatient Medications  acetaminophen   Tablet .. 650 milliGRAM(s) Oral every 6 hours PRN  dextrose 40% Gel 15 Gram(s) Oral once PRN  glucagon  Injectable 1 milliGRAM(s) IntraMuscular once PRN      REVIEW OF SYSTEMS  --------------------------------------------------------------------------------  no itching or rash  no fever or chill  no cp or palp   no sob or cough   no N/V/D/ no abd pain   ext still with c/o edema         VITALS/PHYSICAL EXAM  --------------------------------------------------------------------------------  T(C): 36.5 (06-23-20 @ 15:23), Max: 36.8 (06-22-20 @ 23:22)  HR: 74 (06-23-20 @ 15:23) (68 - 77)  BP: 158/75 (06-23-20 @ 15:23) (146/72 - 162/67)  RR: 18 (06-23-20 @ 15:23) (18 - 18)  SpO2: 100% (06-23-20 @ 15:23) (94% - 100%)  Wt(kg): --        06-22-20 @ 07:01  -  06-23-20 @ 07:00  --------------------------------------------------------  IN: 1180 mL / OUT: 2500 mL / NET: -1320 mL    06-23-20 @ 07:01  -  06-23-20 @ 21:40  --------------------------------------------------------  IN: 900 mL / OUT: 2900 mL / NET: -2000 mL      Physical Exam:  		    	Gen: on O2  	-  JVD  	Pulm: decrease bs -   rales -  wheezing  	CV: RRR, S1S2; no rub  	Abd: +BS, soft, nontender/nondistended  	: No suprapubic tenderness  	UE: Warm, no cyanosis  no clubbing,  no edema; no asterixis  	LE: Warm, no cyanosis  no clubbing, 2-  LLE with 1 +  RLE  edema  	  	    LABS/STUDIES  --------------------------------------------------------------------------------              10.5   4.01  >-----------<  230      [06-22-20 @ 06:44]              34.0     130  |  94  |  15  ----------------------------<  187      [06-23-20 @ 05:30]  3.9   |  24  |  3.67        Ca     9.0     [06-23-20 @ 05:30]      Mg     2.2     [06-23-20 @ 05:30]                    imp/suggest: ESRD      Hemodialysis Prescription:  	Access:  	Dialyzer: revaclear   	Blood Flow (mL/Min): 400  	Dialysate Flow (mL/Min): 600  	Target UF (Liters):  	Treatment Time:  	Potassium:   	Calcium: 2.5  	  YOLANDA    Vitamin D     continue with hd   see hd flow sheet

## 2020-06-23 NOTE — PROGRESS NOTE ADULT - ASSESSMENT
64F with PMH of ESRD on HD (TTS), CAD s/p stents, CHF (EF 35%) with severe AS, T1DM, COPD, CVA with no residuals p/w acute onset SOB and LE pain, admitted for RLL PNA and volume overload in setting of missed HD      Community acquired bacterial pneumonia.   - pt with SOB and cough, with CXR showing new RLL opacity c/w PNA  - observe off antibiotics   - f/u cultures  - monitor fever curve.   - pulmonary follow  - ID evaluation noted Cleared for DC    ESRD (end stage renal disease) on dialysis.  - ESRD on HD, missed HD session earlier today  - Nephrology follow.     Acute on chronic systolic heart failure.   - SOB, elevated BNP and lower extremity edema c/w likely HF exacerbation, with last EF 35% and severe AS   - unable to diurese 2/2 anuria   - fluid removal via HD per renal   - monitor I/Os, daily weights  - monitor electrolytes   - c/w BB and ACEi.  - cardiology follow     Pain in both lower extremities.  - acute on chronic b/l LE pain, L>R; currently improved in ED without intervention - likely in setting of PAD. Also with significant swelling in LEs  - fluid removal with HD as above   - tylenol for pain control  - outpt vascular f/u.     Type 1 diabetes mellitus with chronic kidney disease on chronic dialysis.   - c/w lantus 10u qhs, 2u premeals TID   - low sliding scale insulin  - FS ac and hs.     CAD (coronary artery disease).  - c/w ASA and plavix.  - cardiology follow    DC home after HD completed. Follow with PMD/ Cardiology/ Nephrology/ Pulmonary.  Pierce Viera MD pager [phone]

## 2020-07-07 NOTE — PATIENT PROFILE ADULT. - TEACHING/LEARNING DEVELOPMENTAL CONSIDERATIONS
[facility] - Progress Note    Today's Date and Time: 7/7/2020, 4:51 PM    Impression :   · COVID-19 pneumonia right lower lobe  · Shortness of breath with underlying hypoxia  · Nausea  · Dizziness  · Morbid obesity  · History of obstructive sleep apnea    Recommendations:   · Monitor off antibiotics  · Patient consented to receive immune plasma for treatment of the COVID-19 infection    Medical Decision Making/Summary/Discussion:7/7/2020     ·   Infection Control Recommendations   · [place] Precautions  · Contact Isolation   · Airborne isolation  · Droplet Isolation    Antimicrobial Stewardship Recommendations     · Discontinuation of therapy  Coordination of Outpatient Care:   · Estimated Length of IV antimicrobials: None  · Patient will need Midline Catheter Insertion: No  · Patient will need PICC line Insertion: No  · Patient will need: Home IV , [place],  SNF,  LTAC: To be determined  Patient will need outpatient wound care: No  Chief complaint/reason for consultation:   · COVID-19 pneumonia      History of Present Illness:   Josey Parker is a 41y.o.-year-old  male who was initially admitted on 7/6/2020. Patient seen at the request of Dr. Kaylin Wang. INITIAL HISTORY:    Patient presented through ER at [facility] with complaints of progressive shortness of the breath, fatigue, dizziness and nausea. Dated the symptoms have been present for about 2 days. The patient indicated that he took a day off from work yesterday but despite the above he continued to worsen. 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 Denies any issues concerning smoking. Indicates that he drinks on a social basis. He has a past history of morbid obesity, essential hypertension, and obstructive sleep apnea. Examination showed the patient to have a respiratory rate of 22 with a heart rate of 80 and temperature 98.8.   He was hypoxic with an oxygen saturation of Intravenous Q12H    sodium chloride  20 mL Intravenous Once    enoxaparin  40 mg Subcutaneous BID    amLODIPine  10 mg Oral Daily    sodium chloride flush  10 mL Intravenous 2 times per day       Social History:     Social History     Socioeconomic History    Marital status: Single     Spouse name: Not on file    Number of children: Not on file    Years of education: Not on file    Highest education level: Not on file   Occupational History    Not on file   Social Needs    Financial resource strain: Not on file    Food insecurity     Worry: Not on file     Inability: Not on file    Transportation needs     Medical: Not on file     Non-medical: Not on file   Tobacco Use    Smoking status: Never Smoker    Smokeless tobacco: Never Used   Substance and Sexual Activity    Alcohol use: Yes     Alcohol/week: 0.0 standard drinks     Comment: socially    Drug use: No    Sexual activity: Not on file   Lifestyle    Physical activity     Days per week: Not on file     Minutes per session: Not on file    Stress: Not on file   Relationships    Social connections     Talks on phone: Not on file     Gets together: Not on file     Attends Faith service: Not on file     Active member of club or organization: Not on file     Attends meetings of clubs or organizations: Not on file     Relationship status: Not on file    Intimate partner violence     Fear of current or ex partner: Not on file     Emotionally abused: Not on file     Physically abused: Not on file     Forced sexual activity: Not on file   Other Topics Concern    Not on file   Social History Narrative    Not on file       Family History:     Family History   Problem Relation Age of Onset    Diabetes Mother     High Blood Pressure Father     Cancer Maternal Grandmother         Allergies:   Pcn [penicillins]     Review of Systems:   Constitutional: No fevers or chills.   Fatigue, malaise  Head: No headaches  Eyes: No double vision or blurry vision. No conjunctival inflammation. ENT: No sore throat or runny nose. . No hearing loss, tinnitus or vertigo. Cardiovascular: No chest pain or palpitations. Shortness of breath. GUTIERREZ  Lung: Shortness of breath, no cough. No sputum production  Abdomen: No nausea, vomiting, diarrhea, or abdominal pain. Martha Freyon No cramps. Genitourinary: No increased urinary frequency, or dysuria. No hematuria. No suprapubic or CVA pain  Musculoskeletal: No muscle aches or pains. No joint effusions, swelling or deformities  Hematologic: No bleeding or bruising. Neurologic: No headache, weakness, numbness, or tingling. Integument: No rash, no ulcers. Psychiatric: No depression. Endocrine: No polyuria, no polydipsia, no polyphagia. Physical Examination :     Patient Vitals for the past 8 hrs:   BP Temp Temp src Pulse Resp SpO2   07/07/20 1620 126/81 100 °F (37.8 °C) -- 86 21 --   07/07/20 1615 119/77 100.2 °F (37.9 °C) Oral 91 18 93 %   07/07/20 1610 122/78 100 °F (37.8 °C) -- 89 16 --   07/07/20 1119 (!) 137/55 98.5 °F (36.9 °C) Axillary 88 16 100 %     General Appearance: Awake, alert, and in apparent distress. Obese  Head:  Normocephalic, no trauma  Eyes: Pupils equal, round, reactive to light and accommodation; extraocular movements intact; sclera anicteric; conjunctivae pink. No embolic phenomena. ENT: Oropharynx clear, without erythema, exudate, or thrush. No tenderness of sinuses. Mouth/throat: mucosa pink and moist. No lesions. Dentition in good repair. He is receiving oxygen by nasal cannula  Neck:Supple, without lymphadenopathy. Thyroid normal, No bruits. Pulmonary/Chest: Clear to auscultation, without wheezes, rales, or rhonchi. No dullness to percussion. Cardiovascular: Regular rate and rhythm without murmurs, rubs, or gallops. Abdomen: Soft, non tender. Bowel sounds normal. No organomegaly. Protuberant  All four Extremities: No cyanosis, clubbing, edema, or effusions.   Neurologic: No gross sensory or motor deficits. Skin: Warm and dry with good turgor. No signs of peripheral arterial or venous insufficiency. No ulcerations. No open wounds. Medical Decision Making -Laboratory:   I have independently reviewed/ordered the following labs:    CBC with Differential:   Recent Labs     07/06/20  0815 07/07/20  0941   WBC 4.7 3.7   HGB 14.4 14.4   HCT 44.4 45.9    173   LYMPHOPCT 27 22*   MONOPCT 11 6     BMP:   Recent Labs     07/06/20  0815 07/07/20  0941    139   K 3.1* 3.4*    101   CO2 27 26   BUN 16 11   CREATININE 1.50* 0.91   MG  --  2.0     Hepatic Function Panel:   Recent Labs     07/06/20  0815   PROT 6.7   LABALBU 3.5   BILITOT 0.50   ALKPHOS 56   ALT 24   AST 27     No results for input(s): RPR in the last 72 hours. No results for input(s): HIV in the last 72 hours. No results for input(s): BC in the last 72 hours. Lab Results   Component Value Date    MUCUS NOT REPORTED 07/01/2019    RBC 5.03 07/07/2020    TRICHOMONAS NOT REPORTED 07/01/2019    WBC 3.7 07/07/2020    YEAST NOT REPORTED 07/01/2019    TURBIDITY CLEAR 07/01/2019     Lab Results   Component Value Date    CREATININE 0.91 07/07/2020    GLUCOSE 161 07/07/2020       Medical Decision Making-Imaging:     EXAMINATION:   CTA OF THE CHEST 7/6/2020 10:21 am       TECHNIQUE:   CTA of the chest was performed after the administration of intravenous   contrast.  Multiplanar reformatted images are provided for review.  MIP   images are provided for review. Dose modulation, iterative reconstruction,   and/or weight based adjustment of the mA/kV was utilized to reduce the   radiation dose to as low as reasonably achievable.       COMPARISON:   Chest radiograph today.       HISTORY:   ORDERING SYSTEM PROVIDED HISTORY: Chest Pain   TECHNOLOGIST PROVIDED HISTORY:   Chest Pain   Reason for Exam: Increasing SOB, low O2 sats.    Acuity: Acute   Type of Exam: Initial       FINDINGS:   Pulmonary Arteries: Appropriate contrast opacification of the none

## 2020-07-23 NOTE — H&P ADULT - PROBLEM/PLAN-1
Low Fat Diet   AMBULATORY CARE:   A low-fat diet  is an eating plan that is low in total fat, unhealthy fat, and cholesterol  You may need to follow a low-fat diet if you have trouble digesting or absorbing fat  You may also need to follow this diet if you have high cholesterol  You can also lower your cholesterol by increasing the amount of fiber in your diet  Soluble fiber is a type of fiber that helps to decrease cholesterol levels  Different types of fat in food:   · Limit unhealthy fats  A diet that is high in cholesterol, saturated fat, and trans fat may cause unhealthy cholesterol levels  Unhealthy cholesterol levels increase your risk of heart disease  ¨ Cholesterol:  Limit intake of cholesterol to less than 200 mg per day  Cholesterol is found in meat, eggs, and dairy  ¨ Saturated fat:  Limit saturated fat to less than 7% of your total daily calories  Ask your dietitian how many calories you need each day  Saturated fat is found in butter, cheese, ice cream, whole milk, and palm oil  Saturated fat is also found in meat, such as beef, pork, chicken skin, and processed meats  Processed meats include sausage, hot dogs, and bologna  ¨ Trans fat:  Avoid trans fat as much as possible  Trans fat is used in fried and baked foods  Foods that say trans fat free on the label may still have up to 0 5 grams of trans fat per serving  · Include healthy fats  Replace foods that are high in saturated and trans fat with foods high in healthy fats  This may help to decrease high cholesterol levels  ¨ Monounsaturated fats: These are found in avocados, nuts, and vegetable oils, such as olive, canola, and sunflower oil  ¨ Polyunsaturated fats: These can be found in vegetable oils, such as soybean or corn oil  Omega-3 fats can help to decrease the risk of heart disease  Omega-3 fats are found in fish, such as salmon, herring, trout, and tuna   Omega-3 fats can also be found in plant foods, such as walnuts, flaxseed, soybeans, and canola oil    Foods to limit or avoid:   · Grains:      ¨ Snacks that are made with partially hydrogenated oils, such as chips, regular crackers, and butter-flavored popcorn    ¨ High-fat baked goods, such as biscuits, croissants, doughnuts, pies, cookies, and pastries    · Dairy:      ¨ Whole milk, 2% milk, and yogurt and ice cream made with whole milk    ¨ Half and half creamer, heavy cream, and whipping cream    ¨ Cheese, cream cheese, and sour cream    · Meats and proteins:      ¨ High-fat cuts of meat (T-bone steak, regular hamburger, and ribs)    ¨ Fried meat, poultry (turkey and chicken), and fish    ¨ Poultry (chicken and turkey) with skin    ¨ Cold cuts (salami or bologna), hot dogs, bullock, and sausage    ¨ Whole eggs and egg yolks    · Vegetables and fruits with added fat:      ¨ Fried vegetables or vegetables in butter or high-fat sauces, such as cream or cheese sauces    ¨ Fried fruit or fruit served with butter or cream    · Fats:      ¨ Butter, stick margarine, and shortening    ¨ Coconut, palm oil, and palm kernel oil  Foods to include:   · Grains:      ¨ Whole-grain breads, cereals, pasta, and brown rice    ¨ Low-fat crackers and pretzels    · Vegetables and fruits:      ¨ Fresh, frozen, or canned vegetables (no salt or low-sodium)    ¨ Fresh, frozen, dried, or canned fruit (canned in light syrup or fruit juice)    ¨ Avocado    · Low-fat dairy products:      ¨ Nonfat (skim) or 1% milk    ¨ Nonfat or low-fat cheese, yogurt, and cottage cheese    · Meats and proteins:      ¨ Chicken or turkey with no skin    ¨ Baked or broiled fish    ¨ Lean beef and pork (loin, round, extra lean hamburger)    ¨ Beans and peas, unsalted nuts, soy products    ¨ Egg whites and substitutes    ¨ Seeds and nuts    · Fats:      ¨ Unsaturated oil, such as canola, olive, peanut, soybean, or sunflower oil    ¨ Soft or liquid margarine and vegetable oil spread    ¨ Low-fat salad dressing  Other ways to decrease fat:   · Read food labels before you buy foods  Choose foods that have less than 30% of calories from fat  Choose low-fat or fat-free dairy products  Remember that fat free does not mean calorie free  These foods still contain calories, and too many calories can lead to weight gain  · Trim fat from meat and avoid fried food  Trim all visible fat from meat before you cook it  Remove the skin from poultry  Do not bajwa meat, fish, or poultry  Bake, roast, boil, or broil these foods instead  Avoid fried foods  Eat a baked potato instead of [place] fries  Steam vegetables instead of sautéing them in butter  · Add less fat to foods  Use imitation bullock bits on salads and baked potatoes instead of regular bullock bits  Use fat-free or low-fat salad dressings instead of regular dressings  Use low-fat or nonfat butter-flavored topping instead of regular butter or margarine on popcorn and other foods  Ways to decrease fat in recipes:  Replace high-fat ingredients with low-fat or nonfat ones  This may cause baked goods to be drier than usual  You may need to use nonfat cooking spray on pans to prevent food from sticking  You also may need to change the amount of other ingredients, such as water, in the recipe  Try the following:  · Use low-fat or light margarine instead of regular margarine or shortening  · Use lean ground turkey breast or chicken, or lean ground beef (less than 5% fat) instead of hamburger  · Add 1 teaspoon of canola oil to 8 ounces of skim milk instead of using cream or half and half  · Use grated zucchini, carrots, or apples in breads instead of coconut  · Use blenderized, low-fat cottage cheese, plain tofu, or low-fat ricotta cheese instead of cream cheese  · Use 1 egg white and 1 teaspoon of canola oil, or use ¼ cup (2 ounces) of fat-free egg substitute instead of a whole egg       · Replace half of the oil that is called for in a recipe with applesauce when you bake  Use 3 tablespoons of cocoa powder and 1 tablespoon of canola oil instead of a square of baking chocolate  How to increase fiber:  Eat enough high-fiber foods to get 20 to 30 grams of fiber every day  Slowly increase your fiber intake to avoid stomach cramps, gas, and other problems  · Eat 3 ounces of whole-grain foods each day  An ounce is about 1 slice of bread  Eat whole-grain breads, such as whole-wheat bread  Whole wheat, whole-wheat flour, or other whole grains should be listed as the first ingredient on the food label  Replace white flour with whole-grain flour or use half of each in recipes  Whole-grain flour is heavier than white flour, so you may have to add more yeast or baking powder  · Eat a high-fiber cereal for breakfast   Oatmeal is a good source of soluble fiber  Look for cereals that have bran or fiber in the name  Choose whole-grain products, such as brown rice, barley, and whole-wheat pasta  · Eat more beans, peas, and lentils  For example, add beans to soups or salads  Eat at least 5 cups of fruits and vegetables each day  Eat fruits and vegetables with the peel because the peel is high in fiber  © 2017 [employer] Information is for End User's use only and may not be sold, redistributed or otherwise used for commercial purposes  All illustrations and images included in CareNotes® are the copyrighted property of A D [employer] M [employer]  or [employer]  The above information is an  only  It is not intended as medical advice for individual conditions or treatments  Talk to your doctor, nurse or pharmacist before following any medical regimen to see if it is safe and effective for you  Heart Healthy Diet   AMBULATORY CARE:   A heart healthy diet  is an eating plan low in total fat, unhealthy fats, and sodium (salt)  A heart healthy diet helps decrease your risk for heart disease and stroke   Limit the amount of fat you eat to 25% to 35% of your total daily calories  Limit sodium to less than 2,300 mg each day  Healthy fats:  Healthy fats can help improve cholesterol levels  The risk for heart disease is decreased when cholesterol levels are normal  Choose healthy fats, such as the following:  · Unsaturated fat  is found in foods such as soybean, canola, olive, corn, and safflower oils  It is also found in soft tub margarine that is made with liquid vegetable oil  · Omega-3 fat  is found in certain fish, such as salmon, tuna, and trout, and in walnuts and flaxseed  Unhealthy fats:  Unhealthy fats can cause unhealthy cholesterol levels in your blood and increase your risk of heart disease  Limit unhealthy fats, such as the following:  · Cholesterol  is found in animal foods, such as eggs and lobster, and in dairy products made from whole milk  Limit cholesterol to less than 300 milligrams (mg) each day  You may need to limit cholesterol to 200 mg each day if you have heart disease  · Saturated fat  is found in meats, such as bullock and hamburger  It is also found in chicken or turkey skin, whole milk, and butter  Limit saturated fat to less than 7% of your total daily calories  Limit saturated fat to less than 6% if you have heart disease or are at increased risk for it  · Trans fat  is found in packaged foods, such as potato chips and cookies  It is also in hard margarine, some fried foods, and shortening  Avoid trans fats as much as possible    Heart healthy foods and drinks to include:  Ask your dietitian or healthcare provider how many servings to have from each of the following food groups:  · Grains:      ¨ Whole-wheat breads, cereals, and pastas, and brown rice    ¨ Low-fat, low-sodium crackers and chips    · Vegetables:      ¨ Broccoli, green beans, green peas, and spinach    ¨ Collards, kale, and lima beans    ¨ Carrots, sweet potatoes, tomatoes, and peppers    ¨ Canned vegetables with no salt added    · Fruits:      ¨ Bananas, peaches, pears, and pineapple    ¨ Grapes, raisins, and dates    ¨ Oranges, tangerines, grapefruit, orange juice, and grapefruit juice    ¨ Apricots, mangoes, melons, and papaya    ¨ Raspberries and strawberries    ¨ Canned fruit with no added sugar    · Low-fat dairy products:      ¨ Nonfat (skim) milk, 1% milk, and low-fat almond, cashew, or soy milks fortified with calcium    ¨ Low-fat cheese, regular or frozen yogurt, and cottage cheese    · Meats and proteins , such as lean cuts of beef and pork (loin, leg, round), skinless chicken and turkey, legumes, soy products, egg whites, and nuts  Foods and drinks to limit or avoid:  Ask your dietitian or healthcare provider about these and other foods that are high in unhealthy fat, sodium, and sugar:  · Snack or packaged foods , such as frozen dinners, cookies, macaroni and cheese, and cereals with more than 300 mg of sodium per serving    · Canned or dry mixes  for cakes, soups, sauces, or gravies    · Vegetables with added sodium , such as instant potatoes, vegetables with added sauces, or regular canned vegetables    · Other foods high in sodium , such as ketchup, barbecue sauce, salad dressing, pickles, olives, soy sauce, and miso    · High-fat dairy foods  such as whole or 2% milk, cream cheese, or sour cream, and cheeses     · High-fat protein foods  such as high-fat cuts of beef (T-bone steaks, ribs), chicken or turkey with skin, and organ meats, such as liver    · Cured or smoked meats , such as hot dogs, bullock, and sausage    · Unhealthy fats and oils , such as butter, stick margarine, shortening, and cooking oils such as coconut or palm oil    · Food and drinks high in sugar , such as soft drinks (soda), sports drinks, sweetened tea, candy, cake, cookies, pies, and doughnuts  Other diet guidelines to follow:   · Eat more foods containing omega-3 fats  Eat fish high in omega-3 fats at least 2 times a week  · Limit alcohol    Too much alcohol can damage your heart and raise your blood pressure  Women should limit alcohol to 1 drink a day  Men should limit alcohol to 2 drinks a day  A drink of alcohol is 12 ounces of beer, 5 ounces of wine, or 1½ ounces of liquor  · Choose low-sodium foods  High-sodium foods can lead to high blood pressure  Add little or no salt to food you prepare  Use herbs and spices in place of salt  · Eat more fiber  to help lower cholesterol levels  Eat at least 5 servings of fruits and vegetables each day  Eat 3 ounces of whole-grain foods each day  Legumes (beans) are also a good source of fiber  Lifestyle guidelines:   · Do not smoke  Nicotine and other chemicals in cigarettes and cigars can cause lung and heart damage  Ask your healthcare provider for information if you currently smoke and need help to quit  E-cigarettes or smokeless tobacco still contain nicotine  Talk to your healthcare provider before you use these products  · Exercise regularly  to help you maintain a healthy weight and improve your blood pressure and cholesterol levels  Ask your healthcare provider about the best exercise plan for you  Do not start an exercise program without asking your healthcare provider  Follow up with your healthcare provider as directed:  Write down your questions so you remember to ask them during your visits  © 2017 [facility] [facility] Information is for End User's use only and may not be sold, redistributed or otherwise used for commercial purposes  All illustrations and images included in CareNotes® are the copyrighted property of [employer]  or [employer]at[employer] Clifford  The above information is an  only  It is not intended as medical advice for individual conditions or treatments  Talk to your doctor, nurse or pharmacist before following any medical regimen to see if it is safe and effective for you  DISPLAY PLAN FREE TEXT

## 2020-07-24 ENCOUNTER — APPOINTMENT (OUTPATIENT)
Dept: PULMONOLOGY | Facility: CLINIC | Age: 65
End: 2020-07-24

## 2020-07-27 NOTE — ASU PREOP CHECKLIST - WARM FLUIDS/WARM BLANKETS
40 F from home, self ambulating ww DM, CAD/stent, s/p kidney transplant in 1999, in the ER for dizziness and syncopal episode today    In the ED,   Pt is AAOX 3, no signs of any distress  Vitals- 129/91, hR 73  EKG- NSR  Trops 0.015,   BHCG- 3   WBC 12K  Cr 2.15
no

## 2020-07-29 NOTE — DISCHARGE NOTE ADULT - CLICK TO LAUNCH ORM
postural re-education/balance training/bed mobility training/ROM/gait training/strengthening/transfer training/wheelchair management/propulsion training .

## 2020-07-31 NOTE — DISCHARGE NOTE ADULT - PROVIDER TOKENS
TOKEN:'[id number]:'[id number]:'[id number]:'[id number]' DISPLAY PLAN FREE TEXT TOKEN:'[id number]:'[id number]:'[id number]'

## 2020-09-09 NOTE — DIETITIAN INITIAL EVALUATION ADULT. - DOB: +DATEOFBIRTH
Medication changes:  · None    Orders:  · Blood tests: MELD score 6  · Imaging: Ultrasound ordered     Recommendations:  • Avoid all alcohol use  • OK to take Tylenol up to 2,000 mg per day.   • Diet: High protein and low sodium (2,000 mg per day).   • Schedule with primary doctor for routine follow up. Need to check on your heart rhythm.     Follow up: 6 months with labs prior.     *You may receive a survey in the mail regarding today's appointment. We would appreciate your feedback if you could take the time to fill it out!*    
Statement Selected

## 2020-09-16 NOTE — H&P ADULT. - HISTORY OF PRESENT ILLNESS
Pt resting comfortably in bed with eyes closed. optiflow at 50% no distress noted at this time. Continuous sat monitor in place sat 96% at this time. Will monitor. 62 yo woman w/hx of CAD, CVA, DM2, HTN, HLD, and ESRD on HD Tues, Thurs, Sat presenting with SOB for 2 days.     Initial ED vitals--Afebrile, HR 68 201/84 20 99% on 2L NC.   Labs notable for no leukocytosis WBC 4.1 Hb 10.4 Plt 211 BMP Cr 6.05 K 4.5 pBNP >33069 Trop 0.13    Imaging notable for CXR with micronodular opacities at b/l lower lung unclear if previously seen nodules.     In the ED, she received ASA 324mg x1, Lasix 80mg IVP and Nitrostat.     Renal consulted and she was admitted to medicine for further evaluation. 60 yo woman w/hx of CAD, CVA, DM2, HTN, HLD, and ESRD on HD Tues, Thurs, Sat presenting with SOB for 2 days. She reports that she often gets an extra HD session on Wednesday every other week for the past 7 months. Last HD Tuesday and she denied any complications/issues during that session.   She reported orthopnea, LIPSCOMB and slightly increased LE edema. No CP, palpitations, fevers/chills, nausea/vomiting, abdominal pain, change in bowel habits, or sick contacts. No URI symptoms. She reports that she has been adherent with her medication regimen and denied any excess salt/fluid load.     Initial ED vitals--Afebrile, HR 68 201/84 20 99% on 2L NC.   Labs notable for no leukocytosis WBC 4.1 Hb 10.4 Plt 211 BMP Cr 6.05 K 4.5 pBNP >60604 Trop 0.13    Imaging notable for CXR with micronodular opacities at b/l lower lung unclear if previously seen nodules.     In the ED, she received ASA 324mg x1, Lasix 80mg IVP and Nitrostat.     Renal consulted and she was admitted to medicine for further evaluation.

## 2020-10-06 NOTE — ED PROCEDURE NOTE - NS ED PROCEDURE ASSISTED BY
"Monet You is a 43 year old female who is being evaluated via a billable video visit.      The patient has been notified of following:     \"This video visit will be conducted via a call between you and your physician/provider. We have found that certain health care needs can be provided without the need for an in-person physical exam.  This service lets us provide the care you need with a video conversation.  If a prescription is necessary we can send it directly to your pharmacy.  If lab work is needed we can place an order for that and you can then stop by our lab to have the test done at a later time.    Video visits are billed at different rates depending on your insurance coverage.  Please reach out to your insurance provider with any questions.    If during the course of the call the physician/provider feels a video visit is not appropriate, you will not be charged for this service.\"    Patient has given verbal consent for Video visit? Yes  How would you like to obtain your AVS? MyChart  If you are dropped from the video visit, the video invite should be resent to: Text to cell phone: [phone]  Will anyone else be joining your video visit? No  Subjective     Monet You is a 43 year old female who presents today via video visit for the following health issues:    HPI     Chief Complaint   Patient presents with     Derm Problem     rash X2 weeks     -She has had this same rash for the past couple weeks and it will not seem to clear up or go away.    -She did have a video visit with you on 09/28/2020 regarding this same rash. She is wanting to know what next steps she can take.         Video Start Time: 9:20 AM    Video visit completed due to COVID-19 outbreak.     Rash is the same if not worse. Was in Urgent Care on Saturday. Has taken 2 of the diflucan pills. Feels like the rash continues to get worse. Is not sleeping due to itching. Is not eating. Is not able to go to work.  Has "
used last of paid time off.  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  Got over oral aversion of taking medications and is feeling very disappointed because has not noticed any improvement in rash.    Continues to struggle tremendously with anxiety.  Has not reached out to employee assistance program yet-plans to do that today.  Has previously been taken Prozac for anxiety and caused to have thoughts of self-harm and to go into a dark place and does not want to go through that again.   is currently going to school and is not working so is the only income in the household.  Reports  is noticing changes with her and so are her children.  Is checking into outpatient mental health programs.      Review of Systems   Skin: Positive for rash (Itching).   Psychiatric/Behavioral: Positive for sleep disturbance. Negative for suicidal ideas. The patient is nervous/anxious.          Objective           Vitals:  No vitals were obtained today due to virtual visit.    Physical Exam  Constitutional:       Appearance: Normal appearance.   HENT:      Nose: No congestion.   Eyes:      General: Lids are normal.   Pulmonary:      Effort: No tachypnea, bradypnea or respiratory distress.   Skin:     Coloration: Skin is not ashen, cyanotic, jaundiced or pale.      Comments: Pustular erythemic rash noted to bilateral axilla.   Neurological:      Mental Status: She is alert.   Psychiatric:         Mood and Affect: Mood is anxious. Affect is tearful.         Speech: Speech normal.         Behavior: Behavior normal.     Tearful during much of visit.        Assessment & Plan     Rash  Take full course of Diflucan.  Encouraged to start doxycycline that has on hand-likely secondary infection due to itching.  May also use over-the-counter Lotrisone.  Keep appointment with dermatology.    HLIL (generalized anxiety disorder)  Majority of visit today focused around anxiety.  Plans to reach out to employee assistance program today.  Plans to check 
into FMLA-will complete forms.  Discussed adding BuSpar-declines at this time.  We will send prescription for clonazepam to  from the pharmacy in the future.  Much support given.  - clonazePAM (KLONOPIN) 0.5 MG ODT; Take 1 tablet (0.5 mg) by mouth 2 times daily as needed for anxiety        No follow-ups on file.    HIRA Mcgee CNP  [facility]      Video-Visit Details    Type of service:  Video Visit    Video End Time:9:43 AM    Originating Location (pt. Location): Home    Distant Location (provider location):  [facility]     Platform used for Video Visit: [employer]        
The procedure was performed independently
The procedure was performed independently

## 2020-10-28 NOTE — ED ADULT NURSE NOTE - DRUG PRE-SCREENING (DAST -1)
[address], [address], [address]  P [phone]  F [phone]      Patient Discharge Instructions    Mervin Maldonado / [id number] : 1998    Admitted 10/28/2020 Discharged: 10/28/2020     Take Home Medications     No current facility-administered medications on file prior to encounter. Current Outpatient Medications on File Prior to Encounter   Medication Sig Dispense Refill    norgestrel-ethinyl estradiol (LOW-OGESTREL, 28, PO) Take 1 Tab by mouth nightly.  ibuprofen (MOTRIN) 600 mg tablet Take 1 Tab by mouth every eight (8) hours as needed for Pain. 30 Tab 2       · It is important that you take the medication exactly as they are prescribed. · Keep your medication in the bottles provided by the pharmacist and keep a list of the medication names, dosages, and times to be taken in your wallet. · Do not take other medications without consulting your doctor. What to do at Home    Recommended diet: Regular Diet, stay hydrated. You should take a stool softener such as colace for constipation. If constipation persists for >24 hours you should take a dose of Milk of Magnesia. Call if your constipation persists. If you have had a hysterectomy or vaginal surgery you may experience vaginal spotting. This is acceptable. Should the bleeding require more that 4 pads a day please call our office. Nothing per vagina for 6-8 weeks. Recommended activity:   No driving for 1 week and/or while on pain medication  Walk at least 6 times per day  Showers okay, no tub bathing/swimming for two weeks  Activity as able, stairs okay. If you had a laparoscopic procedure, no lifting greater than 25 lbs for 3 weeks. If you had an open procedure, no heavy lifting greater than 15 lbs for 6 weeks.     If you experience any of the following persisting symptoms:    Nausea/vomiting, fever > 101 F, diarrhea/constipation, increasing pain despite medication, increasing vaginal discharge or any concerns, please call our office. Follow-up with Dr. Evans Obando in 2 weeks. Call [phone] for an appointment. Information obtained by :  I understand that if any problems occur once I am at home I am to contact my physician. I understand and acknowledge receipt of the instructions indicated above. [de-identified] or R.N.'s Signature                                                                  Date/Time                                                                                                                                              Patient or Representative Signature                                                          Date/Time    Patient Education        Laparoscopic Oophorectomy: What to Expect at Home  Your Recovery     Laparoscopic oophorectomy is surgery to remove your ovaries. Your doctor put a lighted tube (scope) and other tools through small cuts in your belly. The doctor then removed one or both of your ovaries. After surgery, you may feel some pain in your belly for a few days. Your belly may also be swollen. You may have a change in your bowel movements for a few days. It's normal to also have some shoulder or back pain. This is caused by the gas your doctor put in your belly to help see your organs better. To help with pain, your doctor will prescribe medicines. You may need about 1 week to fully recover. It's important not to lift anything heavy for about 1 week. You can ask your doctor when it's okay to have sex. This care sheet gives you a general idea about how long it will take for you to recover. But each person recovers at a different pace. Follow the steps below to get better as quickly as possible. How can you care for yourself at home? Activity    · Rest when you feel tired.     · Be active. Walking is a good choice.     · Allow your body to heal. Don't move quickly or lift anything heavy until you are feeling better.     · Hold a pillow over your incisions when you cough or take deep breaths. This will support your belly and may help to decrease your pain.     · Do breathing exercises at home as instructed by your doctor. This will help prevent pneumonia. Diet    · You can eat your normal diet. If your stomach is upset, try bland, low-fat foods like plain rice, broiled chicken, toast, and yogurt.     · Drink plenty of fluids (unless your doctor tells you not to).   · If your bowel movements are not regular right after surgery, try to avoid constipation and straining. Drink plenty of water. Your doctor may suggest fiber, a stool softener, or a mild laxative. Medicines    · Your doctor will tell you if and when you can restart your medicines. He or she will also give you instructions about taking any new medicines.     · If you take aspirin or some other blood thinner, ask your doctor if and when to start taking it again. Make sure that you understand exactly what your doctor wants you to do.     · Be safe with medicines. Read and follow all instructions on the label. ? If the doctor gave you a prescription medicine for pain, take it as prescribed. ? If you are not taking a prescription pain medicine, ask your doctor if you can take an over-the-counter medicine. Incision care    · If you have strips of tape on the cut (incision) the doctor made, leave the tape on for a week or until it falls off.     · Wash the area daily with warm, soapy water, and pat it dry. Don't use hydrogen peroxide or alcohol. They can slow healing.     · You may cover the area with a gauze bandage if it oozes fluid or rubs against clothing.     · Change the bandage every day.     · Keep the area clean and dry. Other instructions    · Wear loose, comfortable clothing.  For a few weeks, avoid anything that puts pressure on your belly.     · You may want to use a heating pad on your belly to help with pain. Follow-up care is a key part of your treatment and safety. Be sure to make and go to all appointments, and call your doctor if you are having problems. It's also a good idea to know your test results and keep a list of the medicines you take. When should you call for help? Call 911 anytime you think you may need emergency care. For example, call if:    · You passed out (lost consciousness).     · You have chest pain, are short of breath, or cough up blood. Call your doctor now or seek immediate medical care if:    · You have pain that does not get better after you take pain medicine.     · You cannot pass stools or gas.     · You have vaginal discharge that has increased in amount or smells bad.     · You are sick to your stomach or cannot drink fluids.     · You have loose stitches, or your incision comes open.     · Bright red blood has soaked through the bandage over your incision.     · You have signs of infection, such as:  ? Increased pain, swelling, warmth, or redness. ? Red streaks leading from the incision. ? Pus draining from the incision. ? A fever.     · You have bright red vaginal bleeding that soaks one or more pads in an hour, or you have large clots.     · You have signs of a blood clot in your leg (called a deep vein thrombosis), such as:  ? Pain in your calf, back of the knee, thigh, or groin. ? Redness and swelling in your leg. Watch closely for changes in your health, and be sure to contact your doctor if you have any problems. Where can you learn more? Go to http://www.gray.com/  Enter D763 in the search box to learn more about \"Laparoscopic Oophorectomy: What to Expect at Home. \"  Current as of: November 8, 2019               Content Version: 12.6  © 8086-4898 [employer].    Care instructions adapted under license by [employer] (which disclaims liability or warranty for this information). If you have questions about a medical condition or this instruction, always ask your healthcare professional. Paul Hughes any warranty or liability for your use of this information. ______________________________________________________________________    Anesthesia Discharge Instructions    After general anesthesia or intervenous sedation, for 24 hours or while taking prescription Narcotics:  · Limit your activities  · Do not drive or operate hazardous machinery  · If you have not urinated within 8 hours after discharge, please contact your surgeon on call. · Do not make important personal or business decisions  · Do not drink alcoholic beverages    Report the following to your surgeon:  · Excessive pain, swelling, redness or odor of or around the surgical area  · Temperature over 100.5 degrees  · Nausea and vomiting lasting longer than 4 hours or if unable to take medication  · Any signs of decreased circulation or nerve impairment to extremity:  Change in color, persistent numbness, tingling, coldness or increased pain.   · Any questions Statement Selected

## 2020-11-08 NOTE — PATIENT PROFILE ADULT. - TEACHING/LEARNING OTHER LEARNERS
Progress Note    Patient: Johan Chambers Date: 11/8/2020   male, 71 year old  Admit Date: 10/29/2020   Attending: Elvin Villegas MD      Subjective:  Johan Chambers is a 71 year old male who is being seen in follow up for septic shock, E coli pyelonephritis, CAP, Acute metabolic encephalopathy on top of dementia     Today  More awake today    Still very confused and almost non verbal, occasionally able to follow some commands, near by his baseline as per family   No fever or chills   Less cough and less SOB   No chest pain  No abdominal pain, nausea or vomiting                Medications: personally reviewed today in this patient's active orders section of epic  Allergies:   Allergies as of 10/29/2020 - Reviewed 10/29/2020   Allergen Reaction Noted   • Lipitor [atorvastatin calcium] MYALGIA 03/18/2009   • Lisinopril Cough 07/27/2011       PHYSICAL EXAM:  Visit Vitals  /66 (BP Location: RUE - Right upper extremity, Patient Position: Semi-Petty's)   Pulse 83   Temp 97.3 °F (36.3 °C) (Oral)   Resp 14   Ht 5' 10\" (1.778 m)   Wt 57.7 kg Comment: Without bed pump and scd pump   SpO2 100%   BMI 18.25 kg/m²     General: Advanced dementia, A & O X 0 in no acute distress, normocephalic/atraumatic, Mood and Affect appropriate  Lungs: Decreased breathsounds at the bases and Bibasilar rales  Heart:  Regular rate and rhythm and S1, S2 present  Abdomen: NT/ND;  + B.S.; No guarding or rebound; No peritoneal signs  Extremities: cyanosis absent; no obvious lesions or wounds, minimal edema   Neurologic:  Moving all 4 limbs     Labs:  Recent Labs   Lab 11/07/20  0751 11/06/20  0557 11/06/20  0405 11/05/20  0559  11/02/20  0427   WBC 10.2 11.8*  --  10.0   < > 8.9   RBC 4.24* 3.01*  --  2.56*   < > 3.14*   HGB 11.3* 8.2* 8.4* 6.6*   < > 8.1*   HCT 35.6* 25.7* 26.7* 21.0*   < > 26.0*   * 457*  --  357   < > 207   SEG  --   --   --   --   --  64    < > = values in this interval not displayed.     Recent Labs   Lab  11/08/20  0521 11/07/20  0751 11/06/20  0557 11/05/20  0559  11/03/20  0511 11/02/20  0426   SODIUM  --  141 142 141   < > 141 143   POTASSIUM 4.2 3.7 4.4 3.8   < > 3.0* 4.1   CHLORIDE  --  109* 111* 109*   < > 109* 112*   CO2  --  25 24 24   < > 25 23   BUN  --  25* 24* 22*   < > 22* 30*   CREATININE  --  1.07 1.11 1.07   < > 1.09 1.17   GLUCOSE  --  89 75 85   < > 101* 145*   CALCIUM  --  8.4 8.3* 8.0*   < > 8.0* 7.9*   ALBUMIN  --   --   --  1.9*  --  1.6* 1.5*   AST  --   --   --  31  --  28 35   GPT  --   --   --  24  --  25 31   BILIRUBIN  --   --   --  0.6  --  0.3 0.3   ALKPT  --   --   --  83  --  87 100    < > = values in this interval not displayed.       Assessment & Plan:     · Acute E coli UTI  - completed Cefepime course, urine culture grew E coli pan sensitive,  Blood cultures negative     · Bilateral pneumonia - COVID swab PCR is negative, Pct markedly elevated and trending down, completed Cefepime course      · Septic shock resolved- s/p Pressors, resolved, continue to monitor BP closely, more stable now    · Acute hypoxic respiratory failure resolved s/p intubation and extubation, weaned off O2, continue to monitor O2 Sat     · Acute kidney injury on top of CKD stage II - secondary to sepsis and possible obstructive uropathy, improving     · Bilateral hydronephrosis mild as per renal US done on 10/29/2020, continue on Clements's cath and consider voiding trial as outpatient with Urology     · Acute metabolic encephalopathy on top of  Advanced Alzheimer's dementia secondary to the above, slowly improving, Palliative team consulted and following     · Normocytic anemia worsening secondary to current infection, iron panel matches with anemia of chronic inflammation, no overt bleed, Folic acid level is low, started on folic supplement, transfused 1 unit of PRBCs, consent obtained from his POA      · DM type II with occasional hypoglycemia not on medications prior to admission, hypoglycemia likely  secondary to poor oral intake, continue on  Monitor BS    · Depression and anxiety continue on Depakote, Namenda and Remeron  · Discontinued Olanzapine, Sertraline and Aricept for now to avoid hypotension and sedation      · Dysphagia improving, diet advanced to regular as per speech evaluation but with assist         · DVT Prophylaxis  Heparin SQ       Code status: Selective Treatment/DNR    Disposition: To home [facility] but waiting for acceptance and delivery of equipment     Elvin Villegas MD  Hospitalist  11/8/2020  11:52 AM             spouse

## 2020-11-09 NOTE — PROGRESS NOTE ADULT - NSHPATTENDINGPLANDISCUSS_GEN_ALL_CORE
Last time we talked I had thought that the narcotics for her surgery could be causing her itching. Did stopping them make the itching resolve?    Is she still taking the protonix? How often did she take the carafate? How are her bowels with all this?  
MICU team
MICU team
patient, medicine team

## 2020-12-04 NOTE — PROGRESS NOTE ADULT - ASSESSMENT
Per Dr. Baumann I would increase his clonidine to one and a half tablets at night to help more for sleep. If his medicine doing better when in his system, want to know is time focalin wearing off. I s he taking 3 pm focalin, is problem before this wears off or after morning. If he is not taking it, I would start it back. He may be nettles when it wears off and then I would think of giving his Depakote around 5 pm if nettles when 3 pm wears off.    Mom reports Focalin XR  wearing off around 1:30 pm. He takes the 3 pm booster.  Problem are after the morning dose.  Mom reports morning Depakote increased to 3  Of the 125 mg capsules.  Depakote  mg taken at 5 pm.    Routing to Dr. Baumann for advisement; direction.     pt w/ h/o ESRD on HD 4x/wk was scheduled for EBUS on 3/1 but was cancelled due to high K+, had HD on 3/1 and is to have HD tonight, repeat K+ in am. EBUS tomorrow. Pt denies CP/SOB, N/V or abd pain    HD tonight  labs in am  reduce lantus dose from 10 to 5  endocrine c/s

## 2020-12-07 NOTE — ED ADULT NURSE NOTE - NSFALLRSKHARMRISK_ED_ALL_ED
74 year old male with PMH HTN, Dyslipidemia, T2DM, CAD s/p CABG, AICD presents with weakness, diarrhea and anorexia x 4 days prior to arrival    #acute hypoxic respiratory failure  - 2/2 covid 19   -   - 02 prn- wean as tolerated  - supportive measures  - isolation precautions  - ID consult appreciated  - decadron  - remdesivir  - incentive spirometry    #Thrombocytopenia/ Lymphopenia    - likely due to viral infection. No bleeding noted  - monitor    #HTN - Essential  - metoprolol and lisinopril  - monitor blood pressure     #T2DM  - a1c on admit 6.3  - sliding scale and levemir  - monitor fingersticks    #Dyslipidemia  - Statin    #CAD s/p CABG  - aspirin, statin, metoprolol and lisinopril    #hx of afib?  - eliquis  - metoprolol  - digoxin    #DVT prophylaxis  -Eliquis    Disposition - pending clinical course    Attempted to call patient son Bill [phone] no answer, however  spoke to patient wife Yakelin [phone]. hospital course to date reviewed. all questions answered no

## 2020-12-09 ENCOUNTER — NON-APPOINTMENT (OUTPATIENT)
Age: 65
End: 2020-12-09

## 2020-12-10 ENCOUNTER — NON-APPOINTMENT (OUTPATIENT)
Age: 65
End: 2020-12-10

## 2020-12-12 NOTE — ED PROVIDER NOTE - NS ED ROS FT
normal...
CONSTITUTIONAL: No fevers, no chills  Eyes: No vision changes  Cardiovascular: No Chest pain  Gastrointestinal: No n/v/d, no abd pain  Genitourinary: Urinates minimally+  SKIN: no rashes.  NEURO: no headache, no weakness or numbness  PSYCHIATRIC: no known mental health issues.

## 2020-12-21 NOTE — DISCHARGE NOTE PROVIDER - CARE PROVIDER_API CALL
used Arsalan Castro  INTERNAL MEDICINE  [address] [place] [address]  Phone: [phone]  Fax: [phone]  Follow Up Time:     Braden Thompson  INTERNAL MEDICINE  [address] [facility] [address]  [place] [address]  Phone: [phone]  Fax: [phone]  Follow Up Time:     Tee Biswas  CARDIOVASCULAR DISEASE  [address] [place] [address]  Phone: [phone]  Fax: [phone]  Follow Up Time:

## 2021-01-01 NOTE — ED ADULT NURSE NOTE - CAS EDN DISCHARGE INTERVENTIONS
From: Dave Johnson  To: Cheyenne Rae  Sent: 2021 10:38 AM CST  Subject: Diarrhea 2 days     This message is being sent by Racheal Johnson on behalf of Dave Johnson.    Good morning , Dave has had diarrhea the last 2 days, she was having solid bowel movements prior , is there anything we can give her to help or should we bring her in? No fever, she has been sleeping fine, I would say just a little fussier than usual, she hasn’t been eating as good (solid food wise) still drinking her formula bottles fine ( regular generic [employer] brand). She just started another cold. Not sure if it’s related to the cold. Thank you    IV intact

## 2021-01-06 NOTE — H&P ADULT. - EKG AND INTERPRETATION
Patient is stating that when he had his last appointment with Dr Limon, Dr Limon said that he (the Patient) could try a different medication instead of atorvastatin (LIPITOR) 80 MG tablet. The patient does not remember the name of the medication. He would like a written prescription mailed to him. [facility]    Personally reviewed EKG. Notable for NSR HR 63 QTc 503

## 2021-01-23 NOTE — CONSULT NOTE ADULT - ASSESSMENT
63 year old woman with uncontrolled DM1 c/b ESRD on HD, neuropathy, retinopathy, CAD, CHF, PVD, TIA here with DKA in setting of running out of insulin in insulin pump and recent GI illness
63 f with  DKA- IVF, Insulin, Endocrine evaluation  CAD stable.   HTN control  ESRD- Nephrology evaluation Dr. Brayan kang HD  LFT abnormal- follow  Empiric antibiotics  MICU care  Further action as per clinical course   Pierce Viera MD pager [phone]
64 yo F pt w/PMHX CAD, CHF (EF 50% 10/2018), TIA, PVD, ESRD HD MTThSat, last HD Sat, presenting w/nausea, vomiting, diarrhea, AMS, found to be in DKA. As per pt and chart notes, pt had Chinese food with her family yesterday, and the entire family then began experiencing dull abdominal pain and diarrhea. Pt endorses dull lower abdominal pain, NBNB vomiting x1, and diarrhea x1. she is now in dka as well as lactic acidosis and hyperkalemia    1- esrd  2- lactic acidosis  3- DKA  4- hyperkalemia    she is hyperkalemic as well as with dka along with lactic acidosis. lactic acidosis etiology unclear. ? infection superimposed as well  she is brittle diabetic despite insulin pump   ct scan abd and pelvis.   to have bcx as well   insulin drip   can hold sensipar temporarily in setting of her GI sx   resume hydralazine when bp remain begin to rise again  hd arrangements made. no significant fluid off today in light of her lactic acidosis   d/w icu team as well  witnessed consent obtained from pt and placed in chart
Alert and oriented to person, place and time

## 2021-02-08 NOTE — CONSULT NOTE ADULT - CONSULT REQUESTED DATE/TIME
11-Jun-2019 00:39
11-Jun-2019 12:00
12-Jun-2019 08:08
11-Jun-2019 00:00
Yes - the patient is able to be screened

## 2021-02-20 NOTE — PATIENT PROFILE ADULT - NSPROIMPLANTSMEDDEV_GEN_A_NUR
Bedside and Verbal shift change report given to Juan Pablo Wallace (oncoming nurse) by Charleen Ardon RN (offgoing nurse). Report included the following information SBAR, Kardex, Intake/Output, MAR, Accordion, Recent Results and Med Rec Status. left av fistula

## 2021-03-18 NOTE — ED ADULT TRIAGE NOTE - PAIN RATING/NUMBER SCALE (0-10): REST
[General Appearance - In No Acute Distress] : no acute distress [Erythema] : erythema of the pharynx [I] : I [Kyphosis] : kyphosis [Abnormal Walk] : normal gait [Motor Tone] : muscle strength and tone were normal [Nail Clubbing] : no clubbing of the fingernails 0 [Cyanosis, Localized] : no localized cyanosis [Skin Color & Pigmentation] : normal skin color and pigmentation [Skin Turgor] : normal skin turgor [Skin Lesions] : no skin lesions [No Focal Deficits] : no focal deficits [Affect] : the affect was normal [] : no respiratory distress [FreeTextEntry1] : high arched palate

## 2021-04-04 NOTE — ASU PATIENT PROFILE, ADULT - VISION (WITH CORRECTIVE LENSES IF THE PATIENT USUALLY WEARS THEM):
Normal vision: sees adequately in most situations; can see medication labels, newsprint 04-Apr-2021 20:14

## 2021-04-09 NOTE — ED PROVIDER NOTE - BIRTH SEX
Quality 400a: One-Time Screening For Hepatitis C Virus (Hcv) For All Patients: Patient received one-time screening for HCV infection Quality 131: Pain Assessment And Follow-Up: Pain assessment using a standardized tool is documented as negative, no follow-up plan required Female Quality 226: Preventive Care And Screening: Tobacco Use: Screening And Cessation Intervention: Patient screened for tobacco use and is an ex/non-smoker Quality 130: Documentation Of Current Medications In The Medical Record: Current Medications Documented Quality 474: Zoster Vaccination Status: Shingrix Vaccination Administered or Previously Received Detail Level: Detailed Quality 110: Preventive Care And Screening: Influenza Immunization: Influenza immunization was not ordered or administered, reason not given Quality 431: Preventive Care And Screening: Unhealthy Alcohol Use - Screening: Patient screened for unhealthy alcohol use using a single question and scores less than 2 times per year

## 2021-04-09 NOTE — DISCHARGE NOTE ADULT - ADMISSION DATE +STARTOFVISITDATE
Patient presents with Right lower back pain / leg pain. Symptoms have been present for 3-4 days. Patient reports for the last several days he has been experiencing a \"shooting\" pain that starts in his lower right back and travels down his right leg. Patient denies any known trauma. Patient does report frequent long distance driving to and from [place] 4 times a week.     Patient has taken Tylenol for pain. Last dose at 0700 this morning.     Patient would like communication of their results via:        Cell Phone:   Telephone Information:   Mobile [phone]     Okay to leave a message containing results? Yes    RN wearing gloves, face shield, face mask, gown.  Patient wearing face mask.           
Statement Selected

## 2021-04-22 NOTE — H&P ADULT - NSHPROSALLOTHERNEGRD_GEN_ALL_CORE
A ([facility] Iv[facility]) catheter was inserted.   All other review of systems negative, except as noted in HPI

## 2021-04-25 NOTE — DISCHARGE NOTE PROVIDER - NSDCHHENCOUNTER_GEN_ALL_CORE
[facility] DIVISION OF KIDNEY DISEASES AND HYPERTENSION -- [phone]  -- INITIAL CONSULT NOTE  --------------------------------------------------------------------------------  If any questions, please feel free to contact me  NS pager: [phone], [facility]: [phone]  Davide Correa M.D.  Nephrology Fellow    (After 5 pm or on weekends please page the on-call fellow)    --------------------------------------------------------------------------------    HPI:  64 year-old male with PMHx HTN, DM2, PAD s/p RLE angioplasty, recent RLE 2nd digit distal amputation at [facility], who presented to [facility] for sudden onset slurred speech. He was found with NSTEMI and acute Heart failure exacerbation. Patient reports that before admission he was on IV antibiotics, cefepime, to complete 6 weeks. He also reports previous hx of orthopnea and mild LE edema, which now has improved. Currently he denies Headache, dizziness, chest pain, palpitations, cough, wheezing, SOB, nausea, vomiting, diarrhea.  He denies previous hx of kidney disease, no recent NSAID use or herbal medications. On admission found with sCr: 2.1mg/dl, and peaked today to 3.9mg/dl. He received 1 dose of 40mg IV lasix on 4/22.       PAST HISTORY  --------------------------------------------------------------------------------  PAST MEDICAL & SURGICAL HISTORY:  PAD (peripheral artery disease)    Essential hypertension    Type 2 diabetes mellitus, with long-term current use of insulin    History of amputation of toe    S/P angioplasty  RLE      FAMILY HISTORY:  No pertinent family history in first degree relatives      PAST SOCIAL HISTORY:  no smoking, no drugs, no alcohol  ALLERGIES & MEDICATIONS  --------------------------------------------------------------------------------  Allergies    No Known Allergies    Intolerances      Standing Inpatient Medications  aspirin enteric coated 81 milliGRAM(s) Oral daily  atorvastatin 40 milliGRAM(s) Oral at bedtime  cefepime   IVPB 1000 milliGRAM(s) IV Intermittent daily  chlorhexidine 2% Cloths 1 Application(s) Topical daily  dextrose 40% Gel 15 Gram(s) Oral once  dextrose 5%. 1000 milliLiter(s) IV Continuous <Continuous>  dextrose 5%. 1000 milliLiter(s) IV Continuous <Continuous>  dextrose 50% Injectable 25 Gram(s) IV Push once  dextrose 50% Injectable 12.5 Gram(s) IV Push once  dextrose 50% Injectable 25 Gram(s) IV Push once  glucagon  Injectable 1 milliGRAM(s) IntraMuscular once  heparin  Infusion.  Unit(s)/Hr IV Continuous <Continuous>  insulin lispro (ADMELOG) corrective regimen sliding scale   SubCutaneous three times a day before meals  insulin lispro (ADMELOG) corrective regimen sliding scale   SubCutaneous at bedtime  metoprolol tartrate 25 milliGRAM(s) Oral two times a day    PRN Inpatient Medications  heparin   Injectable 7000 Unit(s) IV Push every 6 hours PRN  heparin   Injectable 3500 Unit(s) IV Push every 6 hours PRN      REVIEW OF SYSTEMS  --------------------------------------------------------------------------------  Gen: No fevers/chills  Skin: No rashes  Head/Eyes/Ears: Normal hearing,   Respiratory: No dyspnea, cough  CV: No chest pain  GI: No abdominal pain, diarrhea  : No dysuria, hematuria  MSK: No  edema    All other systems were reviewed and are negative, except as noted.    VITALS/PHYSICAL EXAM  --------------------------------------------------------------------------------  T(C): 36.9 (04-25-21 @ 04:24), Max: 36.9 (04-25-21 @ 04:24)  HR: 66 (04-25-21 @ 10:27) (62 - 80)  BP: 172/76 (04-25-21 @ 07:57) (161/71 - 172/76)  RR: 17 (04-25-21 @ 04:24) (16 - 17)  SpO2: 95% (04-25-21 @ 10:27) (95% - 100%)  Wt(kg): --  Height (cm): 177.8 (04-23-21 @ 15:46)      04-24-21 @ 07:01  -  04-25-21 @ 07:00  --------------------------------------------------------  IN: 240 mL / OUT: 0 mL / NET: 240 mL      Physical Exam:  	Gen: NAD, cooperative  	HEENT: MMM  	Pulm: CTA B/L  	CV: S1S2  	Abd: Soft, +BS   	Ext: No LE edema B/L  	Neuro: Awake, A&O x3  	Skin: Warm and dry              : no suprapubic tenderness to palpation              Psych: normal affect and mood  	Vascular access: peripheral lines.     LABS/STUDIES  --------------------------------------------------------------------------------              9.6    10.30 >-----------<  348      [04-25-21 @ 07:56]              28.4     129  |  98  |  66  ----------------------------<  198      [04-25-21 @ 07:56]  4.2   |  17  |  3.91        Ca     8.5     [04-25-21 @ 07:56]      Mg     2.4     [04-25-21 @ 07:56]      Phos  4.2     [04-25-21 @ 07:56]    TPro  6.8  /  Alb  3.3  /  TBili  0.4  /  DBili  x   /  AST  34  /  ALT  38  /  AlkPhos  218  [04-23-21 @ 17:58]      PTT: 130.0      [04-25-21 @ 07:56]          [04-23-21 @ 17:58]    Creatinine Trend:  SCr 3.91 [04-25 @ 07:56]  SCr 2.98 [04-24 @ 07:49]  SCr 2.66 [04-23 @ 17:58]  SCr 2.42 [04-23 @ 04:36]  SCr 2.15 [04-22 @ 19:41]    Urinalysis - [04-23-21 @ 00:12]      Color Light Yellow / Appearance Clear / SG 1.011 / pH 6.0      Gluc Negative / Ketone Negative  / Bili Negative / Urobili <2 mg/dL       Blood Small / Protein 30 mg/dL / Leuk Est Negative / Nitrite Negative      RBC 3 / WBC 1 / Hyaline  / Gran  / Sq Epi  / Non Sq Epi  / Bacteria     Urine Creatinine 54      [04-23-21 @ 05:44]  Urine Sodium 51      [04-23-21 @ 05:44]  Urine Urea Nitrogen 349.5      [04-23-21 @ 05:44]    TSH 1.58      [04-23-21 @ 04:36]  Lipid: chol 186, , HDL 28, LDL --      [04-23-21 @ 04:36]       [facility] DIVISION OF KIDNEY DISEASES AND HYPERTENSION -- [phone]  -- INITIAL CONSULT NOTE  --------------------------------------------------------------------------------  If any questions, please feel free to contact me  NS pager: [phone], [facility] [phone]  Davide Correa M.D.  Nephrology Fellow    (After 5 pm or on weekends please page the on-call fellow)    --------------------------------------------------------------------------------    HPI:  64 year-old male with PMHx HTN, DM2, PAD s/p RLE angioplasty, recent RLE 2nd digit distal amputation at [facility], who presented to [facility] for sudden onset slurred speech. He was found with NSTEMI and acute Heart failure exacerbation. Patient reports that before admission he was on IV antibiotics, cefepime, to complete 6 weeks. He also reports previous hx of orthopnea and mild LE edema, which now has improved. Currently he denies Headache, dizziness, chest pain, palpitations, cough, wheezing, SOB, nausea, vomiting, diarrhea.  He denies previous hx of kidney disease, no recent NSAID use or herbal medications. On admission found with sCr: 2.1mg/dl, and peaked today to 3.9mg/dl. He received 1 dose of 40mg IV lasix on 4/22.       PAST HISTORY  --------------------------------------------------------------------------------  PAST MEDICAL & SURGICAL HISTORY:  PAD (peripheral artery disease)    Essential hypertension    Type 2 diabetes mellitus, with long-term current use of insulin    History of amputation of toe    S/P angioplasty  RLE      FAMILY HISTORY:  No pertinent family history in first degree relatives      PAST SOCIAL HISTORY:  no smoking, no drugs, no alcohol  ALLERGIES & MEDICATIONS  --------------------------------------------------------------------------------  Allergies    No Known Allergies    Intolerances      Standing Inpatient Medications  aspirin enteric coated 81 milliGRAM(s) Oral daily  atorvastatin 40 milliGRAM(s) Oral at bedtime  cefepime   IVPB 1000 milliGRAM(s) IV Intermittent daily  chlorhexidine 2% Cloths 1 Application(s) Topical daily  dextrose 40% Gel 15 Gram(s) Oral once  dextrose 5%. 1000 milliLiter(s) IV Continuous <Continuous>  dextrose 5%. 1000 milliLiter(s) IV Continuous <Continuous>  dextrose 50% Injectable 25 Gram(s) IV Push once  dextrose 50% Injectable 12.5 Gram(s) IV Push once  dextrose 50% Injectable 25 Gram(s) IV Push once  glucagon  Injectable 1 milliGRAM(s) IntraMuscular once  heparin  Infusion.  Unit(s)/Hr IV Continuous <Continuous>  insulin lispro (ADMELOG) corrective regimen sliding scale   SubCutaneous three times a day before meals  insulin lispro (ADMELOG) corrective regimen sliding scale   SubCutaneous at bedtime  metoprolol tartrate 25 milliGRAM(s) Oral two times a day    PRN Inpatient Medications  heparin   Injectable 7000 Unit(s) IV Push every 6 hours PRN  heparin   Injectable 3500 Unit(s) IV Push every 6 hours PRN      REVIEW OF SYSTEMS  --------------------------------------------------------------------------------  Gen: No fevers/chills  Skin: No rashes  Head/Eyes/Ears: Normal hearing,   Respiratory: dyspnea  CV: No chest pain  GI: No abdominal pain, diarrhea  : No dysuria, hematuria  MSK: No  edema    All other systems were reviewed and are negative, except as noted.    VITALS/PHYSICAL EXAM  --------------------------------------------------------------------------------  T(C): 36.9 (04-25-21 @ 04:24), Max: 36.9 (04-25-21 @ 04:24)  HR: 66 (04-25-21 @ 10:27) (62 - 80)  BP: 172/76 (04-25-21 @ 07:57) (161/71 - 172/76)  RR: 17 (04-25-21 @ 04:24) (16 - 17)  SpO2: 95% (04-25-21 @ 10:27) (95% - 100%)  Wt(kg): --  Height (cm): 177.8 (04-23-21 @ 15:46)      04-24-21 @ 07:01  -  04-25-21 @ 07:00  --------------------------------------------------------  IN: 240 mL / OUT: 0 mL / NET: 240 mL      Physical Exam:  	Gen: NAD, cooperative  	HEENT: MMM  	Pulm: CTA B/L  	CV: S1S2  	Abd: Soft, +BS   	Ext: No LE edema B/L  	Neuro: Awake, A&O x3  	Skin: Warm and dry              : no suprapubic tenderness to palpation              Psych: normal affect and mood  	Vascular access: peripheral lines.     LABS/STUDIES  --------------------------------------------------------------------------------              9.6    10.30 >-----------<  348      [04-25-21 @ 07:56]              28.4     129  |  98  |  66  ----------------------------<  198      [04-25-21 @ 07:56]  4.2   |  17  |  3.91        Ca     8.5     [04-25-21 @ 07:56]      Mg     2.4     [04-25-21 @ 07:56]      Phos  4.2     [04-25-21 @ 07:56]    TPro  6.8  /  Alb  3.3  /  TBili  0.4  /  DBili  x   /  AST  34  /  ALT  38  /  AlkPhos  218  [04-23-21 @ 17:58]      PTT: 130.0      [04-25-21 @ 07:56]          [04-23-21 @ 17:58]    Creatinine Trend:  SCr 3.91 [04-25 @ 07:56]  SCr 2.98 [04-24 @ 07:49]  SCr 2.66 [04-23 @ 17:58]  SCr 2.42 [04-23 @ 04:36]  SCr 2.15 [04-22 @ 19:41]    Urinalysis - [04-23-21 @ 00:12]      Color Light Yellow / Appearance Clear / SG 1.011 / pH 6.0      Gluc Negative / Ketone Negative  / Bili Negative / Urobili <2 mg/dL       Blood Small / Protein 30 mg/dL / Leuk Est Negative / Nitrite Negative      RBC 3 / WBC 1 / Hyaline  / Gran  / Sq Epi  / Non Sq Epi  / Bacteria     Urine Creatinine 54      [04-23-21 @ 05:44]  Urine Sodium 51      [04-23-21 @ 05:44]  Urine Urea Nitrogen 349.5      [04-23-21 @ 05:44]    TSH 1.58      [04-23-21 @ 04:36]  Lipid: chol 186, , HDL 28, LDL --      [04-23-21 @ 04:36]       21-Jun-2019

## 2021-05-06 NOTE — PROGRESS NOTE ADULT - ASSESSMENT
60 yo F with DM, ESRD. PVD with recurrent DKA/hyperlgycemia. has hx of LLE bypass as well  1- DM/DKA  2- ESRD  3- recent hyperkalemia on admission   4- NSTEMI  5- htn     hydralazine 100mg q8   hd am   insulin pump   lipitor 20mg daily   asa and plavix for peripheral artery disease/stents as well as cad  sensipar 60mg daily and renvela 3 tab with meals Libtayo Counseling- I discussed with the patient the risks of Libtayo including but not limited to nausea, vomiting, diarrhea, and bone or muscle pain.  The patient verbalized understanding of the proper use and possible adverse effects of Libtayo.  All of the patient's questions and concerns were addressed.

## 2021-05-24 NOTE — PATIENT PROFILE ADULT - RESOURCE/ENVIRONMENTAL CONCERNS
From: Alma Rosa Snider  To: Gareth Saez  Sent: 5/24/2021 9:36 AM CDT  Subject: Other    Hey Dr Saez   I stopped taking that Meclizine medicine and I feel better. I think some of the side affects are what was keeping me feel as bad as I was.   none

## 2021-05-25 NOTE — DISCHARGE NOTE NURSING/CASE MANAGEMENT/SOCIAL WORK - NSDCVIVACCINE_GEN_ALL_CORE_FT
[FreeTextEntry1] : 66 y/o female stage at least III poorly diff endometrioid uterine cancer ( extensive metastatic retroperitoneal  VITO, later found to have metastatic  disease in axillary node and   pulm nodule )  [facility] ;  s/p Taxol/ catbo;  s/p Abraxane, on pembrolizumab since May 2019.\par \par Clinically doing well. Asymptomatic . \par \par LAst scans : November 2020 : no evidence of intraabdominal disease. \par Tumor  marker Ca 125 remains stable in normal range ( was elevated at time of diagnosis).\par \par Continue  immunotherapy. Good disease control.\par Will obtain follow up scans for new baseline ( prior done in Nov 2020). Due to her family situation  ( she is taking care of son with TBI) she prefers to postpone scans until later in summer ( ~ August / Sept).\par \par Exam in 3- 4 months- after scans/ sooner PRN. 
Tdap , 2016/3/9 00:30 , Roxie Goncalves (RN)  Tdap , 2019/4/18 09:55 , Rosalina Conti (RN)

## 2021-06-03 NOTE — DISCHARGE NOTE ADULT - DO YOU HAVE DIFFICULTY CLIMBING STAIRS
Mom states needing patient's birth control prescription sent to new pharmacy as having difficulty getting prescription in time from [facility]  Prescription resent to [facility] in [place]      Juli HAQN, RN     No

## 2021-06-16 NOTE — PATIENT PROFILE ADULT - NSPROHMDIABETMGMTSTRAT_GEN_A_NUR
Mikael Venegas wants to do.
blood glucose testing/insulin therapy

## 2021-06-29 NOTE — PROGRESS NOTE ADULT - ASSESSMENT
Structural Heart Disease Clinic Note    PCP: Surjit Barry MD    Leonid Nielsen is a 89 year old male presented in consultation for aortic valve stenosis.  We had discussed his stage D3 aortic stenosis and on dobuatmine echo today appears most consistent with pseudosevere aortic valve stenosis.  Importantly, he also denies any symptoms of shortness of breath or heart failure.    Medications were reviewed including:  Current Outpatient Medications   Medication Sig   • aspirin (ECOTRIN) 81 MG EC tablet Take 81 mg by mouth daily.   • amlodipine-benazepril (LOTREL) 5-10 MG per capsule Take 1 capsule by mouth daily.   • atorvastatin (LIPITOR) 20 MG tablet Take 20 mg by mouth daily.   • apixaBAN (Eliquis) 5 MG Tab Take 0.5 tablets by mouth every 12 hours.     No current facility-administered medications for this visit.       Review of systems is negative except as per HPI  Review of Systems : Constitution: Negative. HENT: Negative.  Eyes: Negative.  Respiratory:Negative, except as per HPI. Endocrine: Negative.  Hematologic/Lymphatic: Negative.  Skin: Negative. Musculoskeletal: Negative.  Gastrointestinal: Negative.  Genitourinary: Negative. Psychiatric/Behavioral: Negative.      has a past surgical history that includes Prostate surgery.    Social history: denies history of substance abuse    Family history: denies family history of sudden cardiac death    Leonid is allergic to penicillins and sulfa antibiotics.    Physical Examination  /77 (BP Location: LUE - Left upper extremity, Patient Position: Sitting, Cuff Size: Regular)   Pulse 94   Temp 96.7 °F (35.9 °C) (Temporal)   Resp 20   Ht 5' 7\" (1.702 m)   Wt 78.5 kg (173 lb 1 oz)   BMI 27.11 kg/m²   BSA 1.9 m²   On physical examination He is sitting up in a chair comfortably no distress. The oropharynx is clear.  His neck is supple without cervical lymphadenopathy. Lungs are clear to auscultation bilaterally. Heart is regular rate and rhythm with  systolic murmur at the base. Abdomen is soft, nontender. Lower extremities are warm and well perfused. There is no lower extremity edema bilaterally. He is alert and oriented.     Transthoracic echocardiogram from 2021 demonstrates:  68%. The stroke index is 20ml/m^2. The LVOT systolic velocity-time integral is 11.6cm.  Aortic valve: Transvalvular velocity is increased less than expected, due to stenosis. There is severe stenosis. No regurgitation. The peak systolic velocity is 2.3m/sec. The mean systolic gradient is 15mm Hg. The valve area by the velocity-time integral method is 0.8cm^2. The valve area index by the velocity-time integral method is 0.38cm^2/m^2.  The ratio of LVOT to aortic valve peak velocity is 0.24.      Assessment and Plan  Leonid Nielsen is a 89 year old male who presents in follow-up.    We discussed his stage D3 aortic stenosis and on dobuatmine echo today appears most consistent with pseudosevere aortic valve stenosis.  Nevertheless his preference was to also obtain AV calcium score which will further risk stratify however overall anticipate continued surveillance and watchful waiting.    Carlo Meade MD, MPH   62 f with  Diabetes type 1/DKA- continue Insulin coverage, Endocrine follow  ESRD- HD  CAD- stable.  HTN control  Abdominal pain resolved- observe closely  d/w JULES Viera MD pager [phone]

## 2021-07-01 NOTE — ED ADULT TRIAGE NOTE - PRO INTERPRETER NEED 2
Is This A New Presentation, Or A Follow-Up?: Growth How Severe Is Your Skin Lesion?: moderate Have Your Skin Lesions Been Treated?: not been treated English

## 2021-07-06 NOTE — DISCHARGE NOTE PROVIDER - NSDCADMDATE_GEN_ALL_CORE_FT
Warm compresses to knee  Keep working to lose weight through healthy eating and exercise.  Have your labs done  No fried foods and limit pasta, bread, and sweets.    
11-Jul-2019 00:10

## 2021-09-17 NOTE — DIETITIAN INITIAL EVALUATION ADULT. - NUTRITIONGOAL OUTCOME1
pt will be able to move forward in stages of change, have been glycemic control Recent admission at [facility] for saddle embolism (9/3 discharge) on warfarin  - Currently continues to have submassive PE without cor pulmonale  - MICU spoke with radiology and scan is unchanged or better  - f/u official read of CTA  - keep warfarin and increase dose from 3 mg to 4mg with INR 1.97  - low threshold to start hep gtt  - daily INR Recent admission at [facility] for saddle embolism (9/3 discharge) on warfarin  - Currently continues to have submassive PE without cor pulmonale  - CTA showed b/l PEs with additional emboli bilaterally compared to old CTA  - increased warfarin dose from 3 mg to 4mg with INR 1.97 -> 2.05  - daily INR s/p chemo/XRT  - c/w home anastrozole 1 mg qd

## 2021-09-19 NOTE — CONSULT NOTE ADULT - ASSESSMENT
63F c hx CAD (Cath Feb '19 showing 99% RCA, 100% RPLS, 100% Cx) s/p multiple stents/brachytherapy, ESRD (on HD M/T/T/Sa), DM1 c/b neuropathy and retinopathy, CHF (EF 30% per last [facility]), mod MR, severe AS, RHF, TIA, severe PVD s/p b/l fempop bypass, left subclavian vein stenosis s/p stent, COPD not on O2, gout, hilar lymphadenopathy of unknown etiology, poor medical mgmt at home, frequent hospitalizations (usually 1-3 times a month) for DKA, hyperglycemia, PNA, recently discharged 3 days ago for hyperkalemia, pw SOB, coughing and fevers with LLL pna.     1- esrd  2- shpt   3- chf hx  4- htn   5- cad  6- DM       hd consent obtained witnessed and placed in chart  hd in am   cont lisinopril   renvela 3 tab with meals   zosyn 3.375 g iv bid  cont O2
No
63 year old woman with poorly controlled DM1 complicated with neuropathy, retinopathy, ESRD on HD (next session 06/17), HTN, HLD, CAD, PVD here with dyspnea, cough and diagnosed with acute pneumonia. Patient admits to poor PO intake given her dyspnea and cough and fatigue.    1. Poorly Controlled T1DM  - No AG or DKA this admission. Was recently discharged from [facility] on 06/12 when she was here for hyperglycemia and early signs of DKA  - Patient with hyperglycemia today but was given much lower insulin doses last night vs. home doses  - Recommend resume home regimen Lantus 4 units QHS and Humalog 3 units TID AC  - Will start with custom low SSI for today since home regimen is being resume today. If FS still high, can start normal low SSI tomorrow  - Patient has hypoglycemia unawareness so if FS<70, please hold next Humalog dose and page endocrine. NEVER hold Lantus, but Lantus will need to be reduced if she has hypo  - consistent carb diet with HS snack. I advised patient to avoid cheating with outside foods inbetween her meals  - d/c on basal bolus insulin with outpatient follow up with Dr. Pina Ley. She admits to having an appointment with Dr. Ley in 2 weeks, will call tomorrow to find out date/time for final discharge papers.    2. Hypertension  - BP goal is <130/80, currently at goal  - Continue home dose Metoprolol 25 mg BID  - Defer adjustment to nephrologist    3. HLD  - Patient is ESRD on HD so recommend continue low dose statin to avoid myalgias  - Continue Atorvastatin 20 mg QHS    Outpatient f/u Dr. Pina Nixon DO  Pager: [phone]
63y female with hx CAD, ESRD on HD, CHF, severesAS, COPD, PVD s/p b/l fenm pop bypass, hx hilar adenopathy, recent hospitalizations for DKA, cough for 2 weeks, occasional fevers and chills. CRX with new patchy L basilar opasity. Pt now started on IV Zosyn. She feels better today, still SOB at baseline, no fevers today    PLAN:  cont Zosyn and Zithromax for possible multifocal pna seen on CT   f/u cultures  d/w Dr Viera

## 2021-09-29 NOTE — PROVIDER CONTACT NOTE (OTHER) - ACTION/TREATMENT ORDERED:
SUBJECTIVE:  Sophia is a 66 year old  here for pessary check.  She has a  ring pessary with support.  The pessary is working well.  Her symptoms are controlled. She denies discharge, bleeding or pain or problems.    OBJECTIVE:  Vitals: /60 (BP Location: LUE - Left upper extremity, Patient Position: Sitting, Cuff Size: Regular)   Ht 5' 7\" (1.702 m)   Wt 82.3 kg (181 lb 8 oz)   General:  Comfortable.  Abdomen:  Soft, nontender.  Pelvic: Genitalia clean and healthy. Pessary removed without difficulty.  Speculum placed, mucosa is healthy, without any ulcerations, malodor or bleeding.  Pessary was cleansed with warm soapy water and rinsed well.  It was replaced without difficulty.    ASSESSMENT:  Normal pessary check.    PLAN:  Pessary removed cleaned and replaced today without issues.  Vagina is healthy and symptoms are well controlled. Follow up in 3 months.  Encouraged to call if any problems develop.    
NP aware,
as per NP, hold sliding scale humalog; administer Humalog 6 Units as a one time dose and bedtime Lantus 10 units. Repeat blood glucose at 2am 06/21/20.

## 2021-10-10 NOTE — ED ADULT NURSE NOTE - NURSING MUSC JOINTS
Telebox placed on patient and confirmed with telemetry techAbby. Sinus tachy; 117.   pain on left leg

## 2021-10-14 NOTE — PATIENT PROFILE ADULT - NSPROMUTANXFEARADDRESSFT_GEN_A_NUR
09/13/2021:  Cancel cataract surgery OD till resolution of shingles.  Consider Valtrex at prior to surgery.
Cataract surgery has been performed in the first eye and activities of daily living are still impaired. The patient would like to proceed with cataract surgery in the second eye as scheduled. The patient elects Basic OD, goal of Grisel.
Continue drops as directed.
Despite some risk factors, the patient does not demonstrate definitive evidence of glaucoma at this time.
Discussed condition and exacerbating conditions/situations (e.g., dry/arid environments, overhead fans, air conditioners, side effect of medications).
Discussed lid hygiene, warm compress and eyelid wash.
Educated patient about signs and symptoms of ocular involvement and asked patient to call immediately for any increasing eye pain, redness or photosensitivity.
Good postoperative appearance.
Gurmeet Aguayo 76 OCT at [facility] consult OU.
Monitor.
No signs of ocular involvement.
[facility]
Patient made aware of 24/7 emergency services.
Patient understands condition, prognosis and need for follow up care.
Patient understands there is an increased risk of corneal edema after cataract surgery.
Recommended artificial tears to use: 1 drop 4x a day in both eyes.
Retinal tear and detachment warning symptoms reviewed and patient instructed to call immediately if increasing floaters, flashes, or decreasing peripheral vision.
d/c contacts 2 weeks prior to consult with Dr. Johnny Posey.
na

## 2021-12-22 NOTE — ED ADULT TRIAGE NOTE - BANDS:
Patient understands condition, prognosis and need for follow up care. Fall Risk; Do Not Use Extremity;

## 2022-01-18 NOTE — H&P ADULT - NSICDXFAMILYHX_GEN_ALL_CORE_FT
FAMILY HISTORY:  Family history of hypertension  Family history of smoking    Sibling  Still living? Yes, Estimated age: Age Unknown  Family history of cancer, Age at diagnosis: Age Unknown no
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
## 2022-03-08 NOTE — DIETITIAN INITIAL EVALUATION ADULT. - NS AS NUTRI DX ORAL SUPORT IN3
Vit D lab was ordered, My Chart message was sent letting patient know lab scheduling number.    Richa Aguilar RN on 3/8/2022 at 9:53 AM     Inadequate oral intake

## 2022-03-28 NOTE — DISCHARGE NOTE NURSING/CASE MANAGEMENT/SOCIAL WORK - NSSCTYPOFSERV_GEN_ALL_CORE
nursing Scribe Attestation (For Scribes USE Only)... Attending Attestation (For Attendings USE Only).../Scribe Attestation (For Scribes USE Only)...

## 2022-04-07 NOTE — PROGRESS NOTE ADULT - ASSESSMENT
61y Female with ESRD on HD, DM, PAD s/p LLE bypass, HTN, who presents with LLE edema and pain, hyperkalemia, now with SOB: MD Klein

## 2022-04-08 NOTE — ED ADULT TRIAGE NOTE - O2 FLOW (L/MIN)
Call placed to the patient.     CC: feeling like something is in the back of her throat, ongoing sinus congestion, drainage, cough, worsening acid reflux    Tried: steroid pack & steroid inh prescribed by her primary care provider    Denies: fever, SOB, inability to swallow food or drink    HX: tonsil stones, evaluation by her primary care provider on 4/1    Care advice and recommendations per protocol. Recommendation is for evaluation within 24 hours. Patient is scheduled today with her primary care provider at 1300. Patient denies any further questions or concerns at this time.     Reason for Disposition  • [1] Swallowing difficulty AND [2] cause unknown (Exception: difficulty swallowing is a chronic symptom)    Protocols used: SWALLOWING DIFFICULTY-A-     2

## 2022-04-11 NOTE — H&P PST ADULT - FALLEN IN THE PAST
Routed to Dr. Lamb- please advise if okay to order Thyroid US. PT previously seen by Dr. Barbara Arrieta with [facility].       Results available through Care Everywhere  Last Thyroid US done 4/2/21  IMPRESSION:   Subcentimeter TR 4 nodule at the inferior pole of the right thyroid lobe.   ACR TI-RADS recommendations:   TR5 (Greater than or equal to 7 points) -FNA if greater than or equal to 1.0 cm, follow-up if 0.5   -0.9 cm every year for 5 years   TR4 (4-6 points) -FNA if greater than or equal to 1.5 cm, follow-up if 1.0 -1.4 cm in 1, 2, 3 and 5   years   TR3 (3 points)-FNA if greater than or equal to 2.5 cm, follow-up if 1.5 -2.4 cm in 1, 3 and 5 years   TR2 (2 points) & TR1 (0 points) -No FNA or follow-up      NM THYROID UPTAKE & SCAN (4/13/2021) (CPT=78014)   CLINICAL INFORMATION:  Hyperthyroidism.   COMPARISON STUDY: Ultrasound of the thyroid gland dated 4/2/2021..   TECHNIQUE: Following the oral administration of 175 uCi I-123, delayed static images 4 hours   postinjection were acquired. Uptakes at both 4 and 24 hours were also acquired.   FINDINGS:   The thyroid gland appears to be homogeneous with uniform distribution of the radionuclide activity.   No definite foci of increased or diminished uptake is noted to suggest focal hot or cold nodules,   respectively.   The 4-hour thyroid uptake is 6.7% (normal range is 4%-15%).   The 24-hour thyroid uptake is 20.2% (normal range is 10%-30%).      no

## 2022-04-13 NOTE — ED PROVIDER NOTE - CCCP TRG CHIEF CMPLNT
Goal Outcome Evaluation:  Vital signs  stable sats 90's.  Lungs clear, diminished in bases.,continuous pulse oximetry.On high flow O2 at 40 lpm @70%.  Encouraged inspirometer use.  Pt is anxious and gets agitated at times. Medicated with scheduled pain meds and prn IR morphine sulphate   tabs with relief.  Dressing to bilateral hips intact, small amount of dried drainage.  CMS, baseline tingling and numbness in hands and feet. Pt called to notify RN that scrotum is swollen, ice pack applied, notified MD.  Plan of Care Reviewed With: patient, significant other                  shortness of breath

## 2022-04-13 NOTE — GOALS OF CARE CONVERSATION - ADVANCED CARE PLANNING - TREATMENT GUIDELINES
4/13/2022    Patient: Sandra Rivera   YOB: 2005   Date of Visit: 4/13/2022     Jessica Bailey MD  [facility] [id number]  Via Outside Provider Messaging    Dear Jessica Bailey MD,      Thank you for referring Ms. Allison Woodson to [facility] for evaluation. My notes for this consultation are attached. If you have questions, please do not hesitate to call me. I look forward to following your patient along with you.       Sincerely,    Lou Carmona MD
NOTIFICATION TO RETURN TO WORK / SCHOOL           Ms. Emerald Epps  [address] [place], [place] [id number]        To Whom It May Concern:      Please excuse Emerald Epps for an appointment in our office on 4/13/2022. If you have any questions, or if we may be of further assistance, do not hesitate to contact us at [phone] ext. [phone]    Restrictions:    No PE/Gym/Sports for 1 week    Comments:     Sincerely,    Merlinda Sers, MD  [facility]
Antibiotic trial/IV fluid trial/DNR Order

## 2022-05-17 NOTE — H&P PST ADULT - CARDIOVASCULAR DETAILS
Area M Indication Text: Tumors in this location are included in Area M (cheek, forehead, scalp, neck, jawline and pretibial skin).  Mohs surgery is indicated for tumors in these anatomic locations. positive S2/positive S1

## 2022-05-24 NOTE — PATIENT PROFILE ADULT - NSPROCHRONICPAINLOC_GEN_A_NUR
Patient stated he took his blood sugar this morning before breakfast, was 166, took 3 hours later, stated he was not feeling quite right. Was 77. Patient stated since starting Jardiance and Sacubitril-Valsartan he has been extremely fatigued. Not sure why this is happening, patient wondering if he is taking too many medications. Patient has not had any other symptoms, other than fatigue. Patient has an apt scheduled for 5- with pcp. Advised patient to continue to monitor blood pressure and blood sugars, and bring the logs in with apt. Patient will contact the office prior to the apt with any new concerns.   Reason for Disposition  • [1] Morning (before breakfast) blood glucose < 80 mg/dL (4.4 mmol/L) AND [2] more than once in past week    Protocols used: DIABETES - LOW BLOOD SUGAR-A-     foot/Left:

## 2022-06-01 NOTE — PROGRESS NOTE ADULT - SUBJECTIVE AND OBJECTIVE BOX
[facility]   [phone]  DIALYSIS NOTE  Chief Complaint: ESRD/Ongoing hemodialysis requirement.     24 hour events/subjective:    seen earlier.   still with overall c/o fatigue         ALLERGIES & MEDICATIONS  --------------------------------------------------------------------------------  Allergies    No Known Allergies    Intolerances      Standing Inpatient Medications  aspirin enteric coated 81 milliGRAM(s) Oral daily  atorvastatin 20 milliGRAM(s) Oral at bedtime  cinacalcet 60 milliGRAM(s) Oral daily  dextrose 5%. 1000 milliLiter(s) IV Continuous <Continuous>  dextrose 50% Injectable 12.5 Gram(s) IV Push once  dextrose 50% Injectable 25 Gram(s) IV Push once  dextrose 50% Injectable 25 Gram(s) IV Push once  dextrose 50% Injectable 25 milliLiter(s) IV Push once  DULoxetine 60 milliGRAM(s) Oral daily  heparin  Injectable 5000 Unit(s) SubCutaneous every 12 hours  hydrALAZINE 100 milliGRAM(s) Oral every 8 hours  insulin detemir injectable (LEVEMIR) 3 Unit(s) SubCutaneous at bedtime  insulin lispro (HumaLOG) corrective regimen sliding scale   SubCutaneous three times a day before meals  insulin lispro (HumaLOG) corrective regimen sliding scale   SubCutaneous at bedtime  insulin lispro Injectable (HumaLOG) 3 Unit(s) SubCutaneous with dinner  insulin lispro Injectable (HumaLOG) 2 Unit(s) SubCutaneous with lunch  insulin lispro Injectable (HumaLOG) 2 Unit(s) SubCutaneous with breakfast  metoprolol succinate ER 50 milliGRAM(s) Oral daily  sevelamer hydrochloride 800 milliGRAM(s) Oral three times a day with meals    PRN Inpatient Medications  dextrose 40% Gel 15 Gram(s) Oral once PRN  glucagon  Injectable 1 milliGRAM(s) IntraMuscular once PRN  nitroglycerin     SubLingual 0.4 milliGRAM(s) SubLingual every 5 minutes PRN  oxyCODONE    IR 5 milliGRAM(s) Oral every 8 hours PRN      REVIEW OF SYSTEMS  --------------------------------------------------------------------------------  no itching or rash  no fever or chill  no cp or palp   no sob or cough   no N/V/D/ no abd pain   ext no edema        VITALS/PHYSICAL EXAM  --------------------------------------------------------------------------------  T(C): 36.5 (02-21-19 @ 19:33), Max: 36.7 (02-21-19 @ 18:12)  HR: 82 (02-21-19 @ 19:33) (67 - 82)  BP: 152/74 (02-21-19 @ 19:33) (105/55 - 168/77)  RR: 18 (02-21-19 @ 19:33) (17 - 18)  SpO2: 97% (02-21-19 @ 19:33) (95% - 98%)  Wt(kg): --        02-20-19 @ 07:01  -  02-21-19 @ 07:00  --------------------------------------------------------  IN: 940 mL / OUT: 0 mL / NET: 940 mL    02-21-19 @ 07:01  -  02-21-19 @ 21:39  --------------------------------------------------------  IN: 500 mL / OUT: 2000 mL / NET: -1500 mL      Physical Exam:    Gen: Non toxic ill appearing muscle wasting   	no jvd ,  	Pulm: decrease bs  no rales or ronchi or wheezing  	CV: RRR, S1/S2; no rub  	Abd: +BS, soft, nontender/nondistended  	: No suprapubic tenderness  	UE: Warm, no cyanosis  no clubbing,  no edema  	LE: Warm, no cyanosis  no clubbing, 1 + pitting zo				    	  	    LABS/STUDIES  --------------------------------------------------------------------------------              11.3   5.08  >-----------<  259      [02-21-19 @ 08:55]              35.8     133  |  93  |  14  ----------------------------<  199      [02-21-19 @ 06:28]  3.7   |  20  |  5.01        Ca     7.3     [02-21-19 @ 06:28]      Phos  3.5     [02-21-19 @ 10:21]    	                imp/suggest: ESRD      Hemodialysis Prescription:  	Access:  	Dialyzer: revaclear   	Blood Flow (mL/Min): 400  	Dialysate Flow (mL/Min): 600  	Target UF (Liters):  	Treatment Time:  	Potassium:   	Calcium: 2.5  	  YOLANDA    Vitamin D     continue with hd   see hd flow sheet Alert and oriented, no focal deficits, no motor or sensory deficits.

## 2022-06-07 NOTE — PATIENT PROFILE ADULT - NSPROGENANESREACTION_GEN_A_NUR
Clinic Follow Up:  [facility] Follow Up Note    Reason for Follow Up:  The encounter diagnosis was Irritable bowel syndrome with diarrhea.    PCP: No primary care provider on file.       The patient location is: [place]   The chief complaint leading to consultation is: IBS-D    Visit type: audiovisual    Face to Face time with patient: 15 minutes   20 minutes of total time spent on the encounter, which includes face to face time and non-face to face time preparing to see the patient (eg, review of tests), Obtaining and/or reviewing separately obtained history, Documenting clinical information in the electronic or other health record, Independently interpreting results (not separately reported) and communicating results to the patient/family/caregiver, or Care coordination (not separately reported).         Each patient to whom he or she provides medical services by telemedicine is:  (1) informed of the relationship between the physician and patient and the respective role of any other health care provider with respect to management of the patient; and (2) notified that he or she may decline to receive medical services by telemedicine and may withdraw from such care at any time.    Notes:       HPI:  This is a 34 y.o. female here for follow up of IBS-D  She had improvements in abdominal pain after taking Xifaxan. She is also taking Elavil every night which has also helped with her GI symptoms along with her chronic headaches. She still does get some diarrhea despite this. She has not needed Levsin in awhile. She will use imodium but it makes her nauseated when taking it.     Review of Systems   Constitutional: Negative for activity change and appetite change.        As per interval history above   Respiratory: Negative for cough and shortness of breath.    Cardiovascular: Negative for chest pain.   Skin: Negative for color change and rash.       Medical History:  Past Medical History:    Diagnosis Date    Dizziness     Folliculitis     History of other diseases of digestive system 4/26/2017 10:46:50 AM    [facility] Historical - Unknown: History of gallstones-No Additional Notes    History of other diseases of digestive system 4/26/2017 10:46:39 AM    [facility] Historical - Unknown: History of IBS-No Additional Notes    History of other diseases of digestive system 4/26/2017 10:46:39 AM    [facility] Historical - Unknown: History of IBS-No Additional Notes    History of other diseases of digestive system 4/26/2017 10:46:50 AM    [facility] Historical - Unknown: History of gallstones-No Additional Notes    History of other infectious or parasitic disease 4/26/2017 10:45:27 AM    [facility] Historical - Unknown: History of HPV infection-tested positive 2 years ago, 2016 pap was clear    History of other infectious or parasitic disease 4/26/2017 10:45:27 AM    [facility] Historical - Unknown: History of HPV infection-tested positive 2 years ago, 2016 pap was clear    Irregular menses     Menarche     Age of onset 14    Polyp of gallbladder        Surgical History:   Past Surgical History:   Procedure Laterality Date    CYSTOSCOPY      bladder polyps removed    ESOPHAGOGASTRODUODENOSCOPY N/A 11/29/2018    Procedure: EGD (ESOPHAGOGASTRODUODENOSCOPY);  Surgeon: Shena Wadsworth MD;  Location: [facility];  Service: Endoscopy;  Laterality: N/A;    NO PAST SURGERIES         Family History:   Family History   Problem Relation Age of Onset    Cancer Father         lymphoma    Hypertension Father     Breast cancer Neg Hx     Colon cancer Neg Hx     Ovarian cancer Neg Hx        Social History:   Social History     Tobacco Use    Smoking status: Never Smoker    Smokeless tobacco: Never Used   Substance Use Topics    Alcohol use: Yes     Comment: occasional    Drug use: No       Allergies: Review of patient's allergies indicates:  No Known Allergies    Home  Medications:  Current Outpatient Medications on File Prior to Visit   Medication Sig Dispense Refill    Bifidobacterium infantis (ALIGN) 4 mg Cap Take by mouth.      fluticasone propionate (FLONASE) 50 mcg/actuation nasal spray SHAKE LIQUID AND USE 2 SPRAYS(100 MCG) IN EACH NOSTRIL EVERY DAY 16 g 12    hyoscyamine (LEVSIN/SL) 0.125 mg Subl Place 1 tablet (0.125 mg total) under the tongue every 4 (four) hours as needed (abdominal pain). 120 tablet 2    multivitamin capsule Take 1 capsule by mouth once daily.      sumatriptan (IMITREX) 100 MG tablet Take 1 pill as needed for migraine, may repeat in 2 hours if headache persists, do not take more than 2 tablets in 24 hours. 9 tablet 6    XULANE 150-35 mcg/24 hr once a week.      [DISCONTINUED] amitriptyline (ELAVIL) 25 MG tablet Take 1 tablet (25 mg total) by mouth every evening. 30 tablet 0    [DISCONTINUED] topiramate (TOPAMAX) 25 MG tablet Take 1 tablet (25 mg total) by mouth every evening. 30 tablet 0     Current Facility-Administered Medications on File Prior to Visit   Medication Dose Route Frequency Provider Last Rate Last Admin    [DISCONTINUED] lactated ringers infusion   Intravenous Continuous Shena Wadsworth MD   Stopped at 11/29/18 1227       There were no vitals taken for this visit.  There is no height or weight on file to calculate BMI.  Physical Exam  Constitutional:       General: She is not in acute distress.  HENT:      Head: Normocephalic.   Neurological:      General: No focal deficit present.      Mental Status: She is alert.   Psychiatric:         Mood and Affect: Mood normal.         Judgment: Judgment normal.         Labs: Pertinent labs reviewed.  CRC Screening: NA    Assessment:   1. Irritable bowel syndrome with diarrhea - improved. Still with diarrhea but not as bad as previous.        Recommendations:   - continue Elavil. Refilled.   - Levsin PRN abdominal pain  - trial of colestipol for diarrhea since imodium causes SE of nausea.      Irritable bowel syndrome with diarrhea  -     Discontinue: colestipoL (COLESTID) 1 gram Tab; Take 1 tablet (1 g total) by mouth 2 (two) times daily as needed (diarrhea).  Dispense: 180 tablet; Refill: 3  -     colestipoL (COLESTID) 1 gram Tab; Take 1 tablet (1 g total) by mouth 2 (two) times daily as needed (diarrhea).  Dispense: 60 tablet; Refill: 11  -     amitriptyline (ELAVIL) 25 MG tablet; Take 1 tablet (25 mg total) by mouth every evening.  Dispense: 30 tablet; Refill: 11        Return to Clinic:  Follow up in about 1 year (around 6/7/2023).    Thank you for the opportunity to participate in the care of this patient.  DOMINGA Devi         never had anesthesia

## 2022-06-09 NOTE — PATIENT PROFILE ADULT. - TEACHING/LEARNING RELIGIOUS CONSIDERATIONS
Dr Frederick Toledo Discharge instructions    Weightbearing status-as tolerated, calf pumps in bed 10x every hour when awake. Ice to the operative area all the time for 3 days on top of the dressing/cast.  May shower at home, no bathing  Take pain medications as needed per prescription. Tylenol arthritis 650 mg p.o. 3 times daily for 5 to 7 days on schedule   Orthopedic follow-up in the office in 10 to 14 days at [phone], call if appointment has not been made preop. 09-Jun-2022 15:07 none

## 2022-06-16 NOTE — PROGRESS NOTE ADULT - PROBLEM SELECTOR PLAN 1
Artificial tears OU PRN. Pt with hyperkalemia.  May be partially related to hyperglycemia with insufficient insulin?  Improved s/p temporizing medications.  Will give lasix now given volume overload as well and this should help reduce K+ as well.  No t-wave changes related to K+ on ekg  Should have HD today-will need renal consult in AM

## 2022-06-30 NOTE — PROGRESS NOTE ADULT - PROBLEM SELECTOR PROBLEM 6
Do we have formal pre-op request in my pre-op folder? If not, surgeon needs to fax over (our standard process) request. In the meantime please call patient to schedule pre-op appt with me. He needs labs prior. Orders are at [facility]. 15 mins.
Peripheral vascular disease
Peripheral vascular disease

## 2022-07-18 NOTE — ED PROVIDER NOTE - INTERPRETATION
normal sinus rhythm Humira Counseling:  I discussed with the patient the risks of adalimumab including but not limited to myelosuppression, immunosuppression, autoimmune hepatitis, demyelinating diseases, lymphoma, and serious infections.  The patient understands that monitoring is required including a PPD at baseline and must alert us or the primary physician if symptoms of infection or other concerning signs are noted.
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
## 2022-08-21 NOTE — CONSULT NOTE ADULT - CONSULT REQUESTED BY NAME
Objective     8/21/2022    Kiera Causey MD    Chief Complaint   Patient presents with   • Follow-up     6 month   • Office Visit       Ms. Godfrey returns for routine follow-up.  No major new health issues or events since last visit 6 months ago.    She remains very anxious and this is in part related to various stressors in her family and professional life  She came off of pravastatin which we agreed to try after long discussion last time with various and diffuse set of symptoms including abdominal bloating congestion.  She is also concerned that her diuretic is drying her out too much and causing hair loss.    She is not having much for any consistent chest pains occasional various chest sensations and palpitations no sustained tachypalpitations or exertional chest pain.  She remains fairly sedentary, has not been exercising or following a walking routine with any consistency lately.  She is working on her diet she is down 3 pounds from February  As we have discussed in the past she is not checking her blood pressure at home as it only causes her anxiety to get worse.        Review of Systems   Constitutional: Negative for chills, malaise/fatigue, night sweats, weight gain and weight loss.   HENT: Negative for congestion, hoarse voice and nosebleeds.    Eyes: Negative for visual disturbance.   Cardiovascular: Positive for palpitations. Negative for chest pain, claudication, dyspnea on exertion, leg swelling ( stable, not much), orthopnea and paroxysmal nocturnal dyspnea.   Respiratory: Negative for cough, hemoptysis, shortness of breath, sleep disturbances due to breathing and sputum production.    Endocrine: Negative for cold intolerance, heat intolerance, polydipsia and polyuria.        Hair loss   Hematologic/Lymphatic: Negative for adenopathy and bleeding problem. Does not bruise/bleed easily.   Skin: Negative for itching and rash.   Musculoskeletal: Negative for back pain, falls, joint pain, joint  Dr. Viera swelling, muscle cramps and muscle weakness.   Gastrointestinal: Negative for abdominal pain, change in bowel habit, hematemesis, hematochezia, melena and vomiting.   Genitourinary: Negative for dysuria, frequency, hematuria and nocturia.   Neurological: Negative for excessive daytime sleepiness, focal weakness, headaches, loss of balance, numbness and paresthesias.   Psychiatric/Behavioral: Negative for depression. The patient has insomnia and is nervous/anxious.    Allergic/Immunologic: Negative.          ALLERGIES:   Allergen Reactions   • Amlodipine Other (See Comments)     Insomnia, memorable dreams, stomach shaking, heart racing   • Hydroxychloroquine Other (See Comments)     Unknown    • Rosuvastatin Calcium Other (See Comments)     Insomnia, daytime sleepiness, foggy, chest pains       Current Medications:   Current Outpatient Medications   Medication Sig Dispense Refill   • losartan-hydrochlorothiazide (HYZAAR) 50-12.5 MG per tablet Take 1 tablet by mouth daily. 30 tablet 5   • Rhopressa 0.02 % Solution Place 1 drop into both eyes daily.     • dorzolamide-timolol (COSOPT) 22.3-6.8 MG/ML ophthalmic solution 1 drop 2 times daily.     • omeprazole (PriLOSEC) 40 MG capsule Take 40 mg by mouth as needed.     • brimonidine (ALPHAGAN) 0.2 % ophthalmic solution        No current facility-administered medications for this visit.       Social History:    Social History     Tobacco Use   Smoking Status Never Smoker   Smokeless Tobacco Never Used        reports no history of alcohol use.    Family History:  Family History   Problem Relation Age of Onset   • Stroke Mother    • Hypertension Mother    • Patient is unaware of any medical problems Father    • Cancer, Colon Maternal Uncle        Patient's medications, allergies, past medical, surgical, social and family histories were reviewed and updated as appropriate.    VITALS:   Blood pressure (!) 142/84, pulse 60, height 5' 3\" (1.6 m), weight 88.9 kg (196 lb).  Weight     08/12/22 1328   Weight: 88.9 kg (196 lb)       Physical Exam   Constitutional: She appears healthy. No distress.   HENT:   Nose: No nasal discharge.   Eyes: Conjunctivae are normal.   Neck: Thyroid normal. No JVD present. No neck adenopathy. No thyromegaly present.   Cardiovascular: Regular rhythm, S1 normal, S2 normal and intact distal pulses.  No extrasystoles are present. PMI is not displaced. Exam reveals no gallop.   No murmur heard.  Pulmonary/Chest: Effort normal and breath sounds normal. She has no wheezes. She has no rales. She exhibits no tenderness.   Abdominal: Soft. Bowel sounds are normal. She exhibits no distension and no mass. There is no hepatomegaly. There is no abdominal tenderness.   Musculoskeletal:         General: No edema.      Cervical back: Neck supple.   Neurological: She is oriented to person, place, and time. She has normal motor skills and intact cranial nerves. Gait normal.   Skin: Skin is warm and dry. No cyanosis. Nails show no clubbing.       Diagnostic data:    Component      Latest Ref Rng & Units 5/11/2022           7:04 AM   Fasting Status      0 - 999 Hours 12   CHOLESTEROL      <=199 mg/dL 240 (H)   TRIGLYCERIDE      <=149 mg/dL 114   HDL      >=50 mg/dL 52   CALCULATED LDL      <=129 mg/dL 165 (H)   CALCULATED NON HDL      mg/dL 188   CHOL/HDL      <=4.4 4.6 (H)     Component      Latest Ref Rng & Units 5/11/2022   GLYCOHEMOGLOBIN A1C      4.5 - 5.6 % 5.9 (H)     Component      Latest Ref Rng & Units 5/11/2022           7:04 AM   Sodium      135 - 145 mmol/L 139   Potassium      3.4 - 5.1 mmol/L 4.1   Chloride      98 - 107 mmol/L 106   CO2      21 - 32 mmol/L 29   ANION GAP      10 - 20 mmol/L 8 (L)   Glucose      70 - 99 mg/dL 94   BUN      6 - 20 mg/dL 15   Creatinine      0.51 - 0.95 mg/dL 1.38 (H)   BUN/CREATININE RATIO      7 - 25 11   CALCIUM      8.4 - 10.2 mg/dL 9.8   Fasting Status      0 - 999 Hours 12   Glomerular Filtration Rate      >=60 45 (L)   TOTAL  BILIRUBIN      0.2 - 1.0 mg/dL 0.5   AST/SGOT      <=37 Units/L 56 (H)   ALT/SGPT      <64 Units/L 73 (H)   ALK PHOSPHATASE      45 - 117 Units/L 195 (H)   Albumin      3.6 - 5.1 g/dL 3.6   TOTAL PROTEIN      6.4 - 8.2 g/dL 7.5   GLOBULIN      2.0 - 4.0 g/dL 3.9   A/G Ratio, Serum      1.0 - 2.4 0.9 (L)       Assessment/Plan:        Assessment   Problem List Items Addressed This Visit        Cardiac and Vasculature    Hyperlipidemia, mixed     Patient clearly has a case of extreme anxiety from all medications especially lipid-lowering medications.  She only took pravastatin briefly and developed a diffuse set of intolerances.    I do not think nonstatin lipid-lowering agent is really indicated at this time lacking any benefit and the role of primary prevention.    At this point I do not think we have any other option but for her to continue with efforts with therapeutic lifestyle changes heart healthy diet weight loss and exercise to prevent progression of the impaired glucose tolerance           Relevant Medications    losartan-hydrochlorothiazide (HYZAAR) 50-12.5 MG per tablet       Genitourinary and Reproductive    Hypertensive nephropathy - Primary     She is concerned about side effects of too much diuretic and that is dehydrating her too much  We discussed that certainly some rise in the creatinine is expected with the use of thiazide diuretics but overall her renal function has been stable.  Ideally good blood pressure was most important to protect kidney function.    We agreed to discontinue Dyazide and agreed to use losartan and a small dose of hydrochlorothiazide and indeed angiotensin receptor blocker would have a role in preserving renal function or progression of proteinuria.    Follow-up BMP around weekend of Labor Day was requested  Continue low sodium diet  Weight loss efforts encouraged  Regular aerobic exercise.  We have talked about screening for sleep apnea in the past but this has not been  completed yet we will need to reconsider         Relevant Orders    BASIC METABOLIC PANEL          Return in about 10 weeks (around 10/21/2022).    Szabolcs Anoop Guillen MD, FACC

## 2022-08-29 NOTE — CONSULT NOTE ADULT - SUBJECTIVE AND OBJECTIVE BOX
HPI:  Patient is a 64 year old woman with PMH uncontrolled DM1, HTN, HLD, ESRD (HD M/T/T/Sat), CHF, CAD, ischemic cardiomyopathy presents with fever and AMS, now with DKA.     Endocrine History:  Patient is very well known to our service as she has had repeated admissions for DKA. She was previously on an insulin pump but could not manage her insulin pump and is now on basal bolus insulin. On Basaglar 8 units qhs (which she takes at 10 pm) and Humalog 3 units TID. Follows with endocrinologist Dr. Ley. Has complications of ESRD, neuropathy and retinopathy. Diet wise, she is not adherent to a consistent carb diet. Her HbA1c was 8.2% 3 months ago in June 2019.    She came to the ED last night and did not take her Basaglar before coming. Did not receive Lantus last night while in the ED. She was seen in the ED eating a meal. Now her repeat labs reveal that she is DKA, with anion gap of 33, BHB 5.9, BG in the 400's, bicarb of 14. She has blurry vision and polydipsia. Does not really make any urine.     PAST MEDICAL & SURGICAL HISTORY:  COPD (chronic obstructive pulmonary disease)  Localized enlarged lymph nodes  CHF (congestive heart failure): EF 40-45%  Subclavian vein stenosis, left: s/p stent  DKA, type 1: 1/2015  ACS (acute coronary syndrome): 1/2015 - cath revealed 100% ostial stenosis not amenable to PCI - medical management  TIA (transient ischemic attack): x 2 - 8-9 years ago prior to ASD/VSD repair  CAD (coronary artery disease): s/p stents  Gout: past  CVA (cerebral infarction): with no residual, 8 yrs ago, prior to heart surgery - ST memory loss  Peripheral vascular disease: occluded left fem-pop bypass 5/2015  Diabetes mellitus type 1: Insulin Dependent -  ESRD (end stage renal disease): dialysis  M, tue, thursday, saturday  Hyperlipidemia  Status post device closure of ASD: &quot;clamshell&quot;  History of cardiac catheterization: 1/2015 - no intervention  S/P femoral-popliteal bypass surgery: L and R in 2013 with graft; 5/2015 CFA angioplasty left and ileofemoral endarterectomywith vein patch angioplasty of left fem-pop bypass graft  Multiple vascular surgery both leg, left fempop bypass revision 11/2015  AV (arteriovenous fistula): Left AV graft; revision with stent placement 2-3 years ago  S/P cholecystectomy      FAMILY HISTORY:  Family history of smoking  Family history of hypertension  Family history of cancer (Sibling)    Social History:    No cigarette use  No alcohol use    Outpatient Medications:  · 	docusate sodium 100 mg oral capsule: 1 cap(s) orally 3 times a day  · 	senna oral tablet: 2 tab(s) orally once a day (at bedtime), As needed, Constipation  · 	metoprolol succinate 50 mg oral tablet, extended release: 1 tab(s) orally every 12 hours  · 	isosorbide dinitrate 10 mg oral tablet: 1 tab(s) orally 3 times a day  · 	Multiple Vitamins oral tablet: 1 tab(s) orally once a day  · 	hydrALAZINE 25 mg oral tablet: 1 tab(s) orally 3 times a day  · 	clopidogrel 75 mg oral tablet: 1 tab(s) orally once a day  · 	insulin lispro 100 units/mL injectable solution: 3 unit(s) injectable 3 times a day (before meals)  · 	aspirin 81 mg oral delayed release tablet: 1 tab(s) orally once a day  · 	atorvastatin 20 mg oral tablet: 1 tab(s) orally once a day  · 	Percocet 5/325 oral tablet: 1 tab(s) orally 2 times a day  · 	pantoprazole 40 mg oral delayed release tablet: 1 tab(s) orally once a day  · 	Basaglar KwikPen 100 units/mL subcutaneous solution: 7  subcutaneous once a day (at bedtime)    MEDICATIONS  (STANDING):  ALBUTerol/ipratropium for Nebulization 3 milliLiter(s) Nebulizer every 6 hours  aspirin enteric coated 81 milliGRAM(s) Oral daily  atorvastatin 20 milliGRAM(s) Oral at bedtime  buDESOnide    Inhalation Suspension 0.5 milliGRAM(s) Inhalation two times a day  clopidogrel Tablet 75 milliGRAM(s) Oral daily  dextrose 5%. 1000 milliLiter(s) (50 mL/Hr) IV Continuous <Continuous>  dextrose 50% Injectable 12.5 Gram(s) IV Push once  dextrose 50% Injectable 25 Gram(s) IV Push once  dextrose 50% Injectable 25 Gram(s) IV Push once  docusate sodium 100 milliGRAM(s) Oral three times a day  heparin  Injectable 5000 Unit(s) SubCutaneous every 12 hours  hydrALAZINE 25 milliGRAM(s) Oral three times a day  insulin glargine Injectable (LANTUS) 7 Unit(s) SubCutaneous once  insulin lispro (HumaLOG) corrective regimen sliding scale   SubCutaneous every 6 hours  isosorbide   dinitrate Tablet (ISORDIL) 10 milliGRAM(s) Oral three times a day  metoprolol succinate ER 50 milliGRAM(s) Oral daily  pantoprazole    Tablet 40 milliGRAM(s) Oral before breakfast    MEDICATIONS  (PRN):  acetaminophen   Tablet .. 650 milliGRAM(s) Oral every 6 hours PRN Temp greater or equal to 38.5C (101.3F), Mild Pain (1 - 3)  dextrose 40% Gel 15 Gram(s) Oral once PRN Blood Glucose LESS THAN 70 milliGRAM(s)/deciliter  glucagon  Injectable 1 milliGRAM(s) IntraMuscular once PRN Glucose LESS THAN 70 milligrams/deciliter  oxyCODONE    5 mG/acetaminophen 325 mG 1 Tablet(s) Oral every 6 hours PRN Moderate Pain (4 - 6)  senna 2 Tablet(s) Oral at bedtime PRN Constipation      Allergies  No Known Allergies      Review of Systems:  Constitutional: + fever  Eyes: + blurry vision  Neuro: No tremors  HEENT: No pain  Cardiovascular: No chest pain, palpitations  Respiratory: no SOB, + cough  GI: No nausea, vomiting, abdominal pain  : No dysuria  Skin: no rash  Endocrine: does not make urine, + polydipsia    ALL OTHER SYSTEMS REVIEWED AND NEGATIVE    PHYSICAL EXAM:  VITALS: T(C): 36.6 (09-15-19 @ 11:44)  T(F): 97.9 (09-15-19 @ 11:44), Max: 101.6 (09-14-19 @ 23:20)  HR: 95 (09-15-19 @ 11:44) (78 - 110)  BP: 124/73 (09-15-19 @ 11:44) (118/55 - 141/80)  RR:  (15 - 35)  SpO2:  (87% - 100%)  Wt(kg): --  GENERAL: NAD, well-developed  EYES: No proptosis, anicteric  HEENT:  Atraumatic, Normocephalic, dry mucous membranes  RESPIRATORY: no wheezes, rhonchi bilaterally  CARDIOVASCULAR: Regular rate and rhythm; No murmurs; 1-2+ pitting edema left lower extremity  GI: Soft, nontender, non distended, normal bowel sounds  SKIN: Dry, intact, No ulcers on feet; skin dry on bilateral feet  PSYCH: Alert and oriented x 3, reactive affect      POCT Blood Glucose.: 421 mg/dL (09-15-19 @ 10:30)                          9.1    12.63 )-----------( 267      ( 15 Sep 2019 09:35 )             30.1       09-15    131<L>  |  84<L>  |  35<H>  ----------------------------<  456<HH>  6.2<HH>   |  14<L>  |  5.06<H>    EGFR if : 10<L>  EGFR if non : 8<L>    Ca    9.9      09-15    TPro  7.2  /  Alb  4.1  /  TBili  0.7  /  DBili  x   /  AST  61<H>  /  ALT  44  /  AlkPhos  125<H>  09-15 Calm

## 2022-09-01 NOTE — ED ADULT TRIAGE NOTE - PAIN RATING/NUMBER SCALE (0-10): ACTIVITY
[facility]      Occupational Therapy: Daily Note   Patient: Galilea Brito (57 y.o. male)   Date:   Plan of Care Certification Period:  22   :  1972  MRN: [id number]  CSN: [id number]   Insurance: Payor: Liam Bocanegra / Plan: [address] [place] [address] / Product Type: *No Product type* /   Insurance ID: [id number] - ([facility]) Secondary Insurance (if applicable): Edmund Quevedo *   Referring Physician: Donnald Hodgkins, DO     PCP: Constanza Angeles MD Visits to Date: Total # of Visits to Date: 8   Progress note:Progress Note Counter: -  Visits Approved: 61 (PT/OT/SLP combined; Secondary- Follow Medicare guidelines)    No Show:    Cancelled Appts:      Medical Diagnosis: 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 Learning/Language barrier: No       HEP/Strategies/Orthosis Compliance: Patient verbally confirmed compliant with HEP's          Restrictions: Healing incisions to right index and thumb           OBJECTIVE EXAMINATION         TREATMENT     Focus of treatment was on the following:   Desensitization, Reassessment, Pain Management     Therapist provides desensitization techniques this date via massage to right hand index and thumb. Patient tolerates well with continued increased sensitivity at the tip of index and thumb.  Patient was provided with scar massage to volar surface of right index finger- activities. Long Term Goal 3: Patient will demonstrate [facility] right hand coordination as measured by a 10 second improvement on 9-HPT. Long Term Goal 4: Patient will increase right hand  strength by 15-20# to increase ease with I/ADLs. Long Term Goal 5: Patient will increase right hand pinch strength by 5# to increase functional use of hand in daily activities. Long Term Goal 6: Patient will be independent with all recommended HEP for pain and edema management, ROM, coordination, and strength. TREATMENT PLAN     Patient to continue progress towards current plan of care.  Next Visit: 9/8/2022           Electronically signed by MICHELA Tolentino  on 9/1/2022 at 5:17 PM 5

## 2022-09-15 NOTE — H&P ADULT - PSH
Assessment & Plan   (J06.9) Viral URI  (primary encounter diagnosis)  Comment:   Plan: Symptomatic; Yes; 9/13/2022 COVID-19 Virus         (Coronavirus) by PCR Nose  Reassured no AOM on examination at this time.  Likely viral illness and fever should self limit in the next 3-4 days.  Advised symptomatic cares for comfort and monitoring hydration.  Follow up in clinic if ongoing or worsening symptoms.      (H10.33) Acute bacterial conjunctivitis of both eyes  Comment:   Plan: ofloxacin (OCUFLOX) 0.3 % ophthalmic solution to use BID if eye drainage is persistent all day long.  Could also use these drops into ears if drainage occurs.                  Follow Up  Return in about 4 days (around 9/19/2022) for as needed if illness symptoms not improving.      Karolyn Richardson PA-C        Subjective   Meliton is a 21 month old accompanied by his mother and grandmother, presenting for the following health issues:  Fever      Fever  Associated symptoms include a fever.   History of Present Illness       Reason for visit:  Fever, acting ill  Symptom onset:  3-7 days ago  Symptom intensity:  Mild  Symptom progression:  Worsening  Had these symptoms before:  Yes  Has tried/received treatment for these symptoms:  No        ENT/Cough Symptoms    Problem started: 1 weeks ago  Fever: Yes - Highest temperature: 102 Ear Normally happens in the evening   Runny nose: YES- green drainage   Congestion: No  Sore Throat: No  Cough: No  Eye discharge/redness:  No  Ear Pain: YES- Pulls at ear   Wheeze: No   Sick contacts: ;  Strep exposure: None;  Therapies Tried: ibuprofen    Meliton is a [id number][id number] male with history of recurrent AOM for which he had PE tube placement May 2022.  He has had 4 days of green drainage from eyes each morning, but not through the day.  Nose now is draining green thick drainage.  He developed a fever on 9/13 evening of 102, but the next day had no temp so he went to . They felt he was more  irritable and had a tactile temp at the end of the day.  He has not had any vomiting, diarrhea or rash noted.      Review of Systems   Constitutional: Positive for fever.      Constitutional, eye, ENT, skin, respiratory, cardiac, and GI are normal except as otherwise noted.      Objective    Pulse 124   Temp 98  F (36.7  C) (Tympanic)   Resp 24   Wt 30 lb (13.6 kg)   SpO2 96%   91 %ile (Z= 1.33) based on WHO (Boys, 0-2 years) weight-for-age data using vitals from 9/15/2022.     Physical Exam   GENERAL: Active, alert, in no acute distress.  HEAD: Normocephalic.  EYES:  Mild erythema of bilateral conjunctivae. Normal pupils and EOM.  RIGHT EAR: normal: no effusions, no erythema, normal landmarks and PE tube well placed and dry  LEFT EAR: normal: no effusions, no erythema, normal landmarks and PE tube well placed and dry  NOSE: clear rhinorrhea and crusty nasal discharge  MOUTH/THROAT: Clear. No oral lesions. Teeth intact without obvious abnormalities.  LYMPH NODES: No adenopathy  LUNGS: Clear. No rales, rhonchi, wheezing or retractions  HEART: Regular rhythm. Normal S1/S2. No murmurs.  ABDOMEN: Soft, non-tender, not distended, no masses or hepatosplenomegaly. Bowel sounds normal.     Diagnostics: None                 AV (arteriovenous fistula)  Left AV graft; revision with stent placement 2-3 years ago  History of cardiac catheterization  1/2015 - no intervention  S/P cholecystectomy    S/P femoral-popliteal bypass surgery  L and R in 2013 with graft; 5/2015 CFA angioplasty left and ileofemoral endarterectomywith vein patch angioplasty of left fem-pop bypass graft  Multiple vascular surgery both leg, left fempop bypass revision 11/2015  Status post device closure of ASD  "brenna"

## 2022-09-22 NOTE — PROVIDER CONTACT NOTE (MEDICATION) - ACTION/TREATMENT ORDERED:
risks / benefits discssed with patient.
risks / benefits discssed with patient.
PAST MEDICAL HISTORY:  No pertinent past medical history

## 2022-09-27 NOTE — INPATIENT CERTIFICATION FOR MEDICARE PATIENTS - NS ICMP TWO DAYS INPATIENT
Indicaciones para el herve del Dr. Bee    Ablación  Cuidado de la incisión: mantenga las zonas de la boni limpias y secas. Puede ducharse en 24 horas, stella debe retirar la cinta transparente y la gasa whitley antes de ducharse. No frote la(s) drew(s) de la boni mientras se duche. Después de ducharse, seque la(s) drew(s) con palmaditas. No aplique loción, cremas ni polvos. No es necesario que cubra la(s) drew(s) con un vendaje. Evite la ropa ajustada o restrictiva nancy 5 días.  Observe el sitio de la incisión en busca de signos y síntomas de infección, kary hinchazón, aumento del enrojecimiento, fiebre, escalofríos o secreción de la incisión. Si experimenta alguno de estos síntomas, llame al consultorio médico INMEDIATAMENTE.   Restricción de actividades: no levante más de 10 libras ni realice actividades extenuantes nancy 5 días.  Puede nevaeh Tylenol según sea necesario para el dolor.     Anesthesia: General Anesthesia  You’re due to have surgery. During surgery, you’ll be given medicine called anesthesia or anesthetic. This will keep you comfortable and pain-free. Your anesthesia provider will use general anesthesia .     You are watched continuously during your procedure by your anesthesia provider.      What is general anesthesia?  General anesthesia puts you into a state like deep sleep. It goes into the bloodstream (IV anesthetics), into the lungs (gas anesthetics), or both. You feel nothing during the procedure. You won't remember it. During the procedure, the anesthesia provider watches you continuously. They check your heart rate and rhythm, blood pressure, breathing, and blood oxygen.   IV anesthetics. IV anesthetics are given through an IV (intravenous) line in your arm. They’re often given first. This is so you're asleep before a gas anesthetic is started. Some kinds of IV anesthetics ease pain. Others relax you. Your healthcare provider will decide which kind is best in your case.  Gas anesthetics.  Yes Gas anesthetics are breathed into the lungs. They're often used to keep you asleep. They can be given through a face mask. Or they can be given through a tube placed in your voice box (larynx) or breathing tube (trachea).  Face mask. Your anesthesia provider will most likely place the face mask over your nose and mouth while you’re still awake. You’ll breathe oxygen through the mask as your IV anesthetic is started. Gas anesthetic may be added through the mask.  Tube in the larynx or trachea. The tube will be inserted into your throat after you’re asleep.  Anesthesia tools and medicines  You will likely have:  IV anesthetics. These are put into an IV line into your bloodstream.  Gas anesthetics. You breathe these anesthetics into your lungs. Then they pass into your bloodstream.  Pulse oximeter. This is a small clip that's attached to the end of your finger. It measures your blood oxygen level.  Electrocardiography leads (electrodes).  These are small sticky pads that are placed on your chest. They record your heart rate and rhythm.  Blood pressure cuff. This reads your blood pressure.  Risks and possible complications  General anesthesia has some risks. These include:   Breathing problems  Upset stomach (nausea) and vomiting  Sore throat or hoarseness (usually temporary)  Allergic reaction to the anesthetic  Irregular heartbeat (rare)  Cardiac arrest (rare)  Anesthesia safety  Follow any directions you're given for not eating or drinking before your procedure.  Tell your healthcare provider knows what medicines you take. This includes prescription and over-the-counter medicines. It includes vitamins, herbs, and other supplements. You'll be asked when those were last taken.  Have an adult family member or friend drive you home after the procedure.  For the first 24 hours after your surgery:  Don't drive or use heavy equipment.  Don't make important decisions or sign legal documents. If important decisions or signing  legal documents is necessary during the first 24 hours after surgery, have a trusted family member or spouse act on your behalf.  Don't drink alcohol.  Have a responsible adult stay with you. They can watch for problems and help keep you safe.     [employer] last reviewed this educational content on 10/1/2021     © 1088-6612 [employer]. All rights reserved. This information is not intended as a substitute for professional medical care. Always follow your healthcare professional's instructions.     Anestesia: cuidado anestésico monitorizado (MAC)    Usted tiene programada bria fecha para bria operación quirúrgica. Nancy la operación, le administrarán un tipo de medicamento llamado anestesia para que esté cómodo y sin dolor. El cirujano empleará un método llamado cuidado anestésico monitorizado (\"MAC\", por stephanie siglas en inglés). En esta hoja se da información sobre saumya tipo de anestesia.  ¿En qué consiste el cuidado anestésico monitorizado?  Con saumya tipo de anestesia usted permanecerá muy adormecido nancy la cirugía. Es posible que esté despierto, stella probablemente no recordará andrew nada de la operación, y no sentirá dolor. En saumya tipo de anestesia se administran medicamentos a través de bria sonda intravenosa en la vena de un brazo o en bria mano. Generalmente se le inyecta anestesia local en la piel y músculo para insensibilizar la drew de la operación. El proveedor de anestesia vigila oneill estado y monitoriza oneill frecuencia y ritmo cardíaco, oneill presión arterial y oneill nivel de oxígeno en la eunice nancy todo el procedimiento.  Medicamentos e instrumentos de anestesia que pueden estar cerca de usted nancy oneill operación  Probablemente tendrá:  Un pulsioxímetro colocado en el extremo de un dedo para medir el nivel de oxígeno en la eunice.  Electrodos de electrocardiografía para registrar la frecuencia y el ritmo cardíacos.  Medicamentos administrados mediante bria sonda intravenosa (IV) para relajarlo y  prevenir el dolor. Es posible que esté despierto o ligeramente dormido. Si le administran anestesia local, ésta se inyecta directamente en la drew a través de oneill piel.  Neema máscara facial para administrarle oxígeno en zoila necesario.   Riesgos y posibles complicaciones  El cuidado anestésico monitorizado tiene algunos riesgos, entre los cuales se encuentran los siguientes:  Problemas de la respiración  Náuseas y vómitos  Reacción alérgica a la anestesia    Medidas de seguridad relacionadas con la anestesia  Siga todas las instrucciones que le hayan dado respecto al tiempo que debe pasar sin comer ni beber antes de la operación.  Asegúrese de informar a oneill médico de todos los medicamentos que esté tomando, especialmente todos los medicamentos antiinflamatorios y los anticoagulantes. [employer] incluye aspirina y también los medicamentos que se adquieren sin receta, las hierbas medicinales y los suplementos.  Póngase de acuerdo con un familiar o amigo adulto para que le lleve a oneill casa después de la operación.  Kaela las primeras 24 horas después de oneill operación:  No maneje automóviles ni maquinaria o herramientas pesadas.  No tome decisiones importantes ni firme ningún documento.  Evite el alcohol.  De ser posible, consiga a alguien que se quede con usted para ayudarle a mantenerse fuera de peligro en zoila de que surja algún problema.  © 6430-5470 [employer]. [address] [place] [address], [place] [address]. Todos los derechos reservados. Esta información no pretende sustituir la atención médica profesional. Sólo oneill médico puede diagnosticar y tratar un problema de adeline.

## 2022-10-12 NOTE — ED ADULT NURSE NOTE - NEURO WDL
Yes
Alert and oriented to person, place and time, memory intact, behavior appropriate to situation, PERRL.

## 2022-10-28 NOTE — DIETITIAN INITIAL EVALUATION ADULT. - FEEDING SKILL
"Alvina Horan (Yahya) was seen and treated in our emergency department on 10/28/2022.     COVID-19 is present in our communities across the state. There is limited testing for COVID at this time, so not all patients can be tested. In this situation, your employee meets the following criteria:    Alvina Horan has met the criteria for COVID-19 testing and has a NEGATIVE result. The employee can return to work once they are asymptomatic for 24 hours without the use of fever reducing medications (Tylenol, Motrin, etc).     If the employee is not fully vaccinated and had a close contact:  · Retest at 5 to 7 days post-exposure  · If possible, it is recommended that they quarantine for 5 days from the time of contact regardless of their test status.  · A mask should be worn post quarantine for 5 days.    If you have any questions or concerns, or if I can be of further assistance, please do not hesitate to contact me.    Sincerely,             Adia Mcconnell PA-C" independent

## 2022-12-30 NOTE — DISCHARGE NOTE ADULT - NS AS DC STROKE DX YN
Patient would like the results of her lab and recommendations for medication.      Component      Latest Ref Rng & Units 12/18/2022   TSH      0.350 - 5.000 mcUnits/mL 0.181 (L)     Taking Synthroid 100 mcg daily       no

## 2023-01-15 NOTE — ED PROVIDER NOTE - CARE PLAN
Feels better but still has JVD and respirator distress with mild exertion.  Good diuresis.  Continue current regimen.  OOB.  Aim for d/c on Monday.
No fever. Feeling better.  Blood cxs (+) in anaerobic bottle for GPC in pairs.  Does not clinically appear to have endocarditis of severe infection.  Check ESR.  Continue Lasix gtt.  No BB for now.
Principal Discharge DX:	Acute pulmonary edema

## 2023-01-23 NOTE — ED PROCEDURE NOTE - NS ED PROCEDURE ASSISTED BY
"Patient: Zuri Hatch    Procedure Summary     Date: 01/23/23 Room / Location: [facility] ENDOSCOPY 4 /  [facility]    Anesthesia Start: 1233 Anesthesia Stop: 1302    Procedure: COLONOSCOPY TO CECUM WITH COLD BX POLYPECTOMY Diagnosis:       Encounter for screening colonoscopy      (Encounter for screening colonoscopy [Z12.11])    Surgeons: Malorie Carl MD Provider: Pravin Kwok MD    Anesthesia Type: Not recorded ASA Status: 3          Anesthesia Type: No value filed.    Vitals  Vitals Value Taken Time   BP 97/69 01/23/23 1320   Temp     Pulse 98 01/23/23 1320   Resp 20 01/23/23 1320   SpO2 96 % 01/23/23 1320           Post Anesthesia Care and Evaluation    Patient location during evaluation: bedside  Patient participation: complete - patient participated  Level of consciousness: awake and alert  Pain management: adequate    Airway patency: patent  Anesthetic complications: No anesthetic complications    Cardiovascular status: acceptable  Respiratory status: acceptable  Hydration status: acceptable    Comments: BP 97/69 (BP Location: Left arm, Patient Position: Sitting)   Pulse 98   Resp 20   Ht 154.9 cm (61\")   Wt 82.7 kg (182 lb 6 oz)   LMP 11/20/2018 (Approximate)   SpO2 96%   BMI 34.46 kg/m²           "
The procedure was performed independently

## 2023-01-26 NOTE — PATIENT PROFILE ADULT. - FUNCTIONAL SCREEN CURRENT LEVEL: TRANSFERRING, MLM
Spoke with patient letting him know he does not need to take vitamin D 50,000units if he finished the 12 week course  He expressed understanding and thanked us for the call  (1) assistive equipment

## 2023-02-08 NOTE — ED PROVIDER NOTE - MUSCULOSKELETAL, MLM
In an effort to ensure that our patients [employer], a clinical Team Member has reviewed your chart and identified an opportunity to provide the best care possible. An Outreach Attempt was made to discuss or schedule overdue Preventative or Disease Management screening.     The Outcome of the Outreach was Contact was not made, appointment already scheduled. Care Gaps include Wellness Visits.      
Spine appears normal, range of motion is not limited, no muscle or joint tenderness

## 2023-02-15 NOTE — H&P ADULT - EJECTION FRACTION >40 %
yes Azelaic Acid Counseling: Patient counseled that medicine may cause skin irritation and to avoid applying near the eyes.  In the event of skin irritation, the patient was advised to reduce the amount of the drug applied or use it less frequently.   The patient verbalized understanding of the proper use and possible adverse effects of azelaic acid.  All of the patient's questions and concerns were addressed.

## 2023-02-16 NOTE — ED ADULT NURSE NOTE - ATTEMPT TO OOB
----- Message from Manjula Pearce PA-C sent at 2/16/2023  8:17 AM CST -----  Please let pt know the macrobid will cover her UTI.  Thanks  
Attempted to call Juanis, no answer. Message left that her antibiotic Macrobid is appropriate for her UTI and she should take to completion.   
no

## 2023-02-27 NOTE — DISCHARGE NOTE ADULT - PLAN OF CARE
[id number] Improved HgA1C this admission.  Make sure you get your HgA1c checked every three months.  If you take oral diabetes medications, check your blood glucose two times a day.  If you take insulin, check your blood glucose before meals and at bedtime.  It's important not to skip any meals.  Keep a log of your blood glucose results and always take it with you to your doctor appointments.  Keep a list of your current medications including injectables and over the counter medications and bring this medication list with you to all your doctor appointments.  If you have not seen your ophthalmologist this year call for appointment.  Check your feet daily for redness, sores, or openings. Do not self treat. If no improvement in two days call your primary care physician for an appointment.  Low blood sugar (hypoglycemia) is a blood sugar below 70mg/dl. Check your blood sugar if you feel signs/symptoms of hypoglycemia. If your blood sugar is below 70 take 15 grams of carbohydrates (ex 4 oz of apple juice, 3-4 glucose tablets, or 4-6 oz of regular soda) wait 15 minutes and repeat blood sugar to make sure it comes up above 70.  If your blood sugar is above 70 and you are due for a meal, have a meal.  If you are not due for a meal have a snack.  This snack helps keeps your blood sugar at a safe range. Continue with diuretics and dialysis as prescribed by your physician.  Follow-up with your primary care physician in 1 to 2 weeks. Call for appointment. Continue with your dialysis treatments. Follow with your nephrologist (kidney physician). Continue your medications as prescribed. Low salt diet  Activity as tolerated.  Take all medication as prescribed.  Follow up with your medical doctor for routine blood pressure monitoring at your next visit.  Notify your doctor if you have any of the following symptoms:   Dizziness, Lightheadedness, Blurry vision, Headache, Chest pain, Shortness of breath Follow-up with your primary care physician and  Vascular Surgeon in 1 to 2 weeks . Call for appointment. Follow-up with your primary care physician and  Vascular doctor in 1 to 2 weeks . Call for appointment. Follow-up with your primary care physician and  Vascular Surgeon Dr. Elizalde in 1 to 2 weeks . Call for appointment. HgA1C this admission 7.3. Follow-up with Dr. Ley . Call for appointment.   Make sure you get your HgA1c checked every three months.  If you take oral diabetes medications, check your blood glucose two times a day.  If you take insulin, check your blood glucose before meals and at bedtime.  It's important not to skip any meals.  Keep a log of your blood glucose results and always take it with you to your doctor appointments.  Keep a list of your current medications including injectables and over the counter medications and bring this medication list with you to all your doctor appointments.  If you have not seen your ophthalmologist this year call for appointment.  Check your feet daily for redness, sores, or openings. Do not self treat. If no improvement in two days call your primary care physician for an appointment.  Low blood sugar (hypoglycemia) is a blood sugar below 70mg/dl. Check your blood sugar if you feel signs/symptoms of hypoglycemia. If your blood sugar is below 70 take 15 grams of carbohydrates (ex 4 oz of apple juice, 3-4 glucose tablets, or 4-6 oz of regular soda) wait 15 minutes and repeat blood sugar to make sure it comes up above 70.  If your blood sugar is above 70 and you are due for a meal, have a meal.  If you are not due for a meal have a snack.  This snack helps keeps your blood sugar at a safe range.

## 2023-03-02 NOTE — PATIENT PROFILE ADULT. - PAIN CHRONIC, PROFILE
Scheduled surgery    ATC: please place PT order    Type of surgery: left shoulder scope, debridement, tenodesis, poss RCR  Location of surgery: [facility]  Date and time of surgery: 3/27/23  Surgeon: Octavio  Pre-Op Appt Date: patient to schedule  Post-Op Appt Date: 4/5/23   Packet sent out: Yes  Pre-cert/Authorization completed:  No  Date: 3.2.23      Ragini Blanchard, Surgery Scheduler         no

## 2023-03-08 NOTE — DIETITIAN INITIAL EVALUATION ADULT. - PROBLEM SELECTOR PROBLEM 6
Problem: Adult Behavioral Health Plan of Care  Goal: Develops/Participates in Therapeutic [place] to Support Successful Transition  Outcome: Not Progressing  Intervention: Foster Therapeutic [place]  Recent Flowsheet Documentation  Taken 3/7/2023 2019 by Gerber Metcalf RN  Trust Relationship/Rapport:   care explained   choices provided   emotional support provided   empathic listening provided   questions answered   questions encouraged   reassurance provided   thoughts/feelings acknowledged     Problem: Adult Behavioral Health Plan of Care  Goal: Develops/Participates in Therapeutic [place] to Support Successful Transition  Intervention: Foster Therapeutic [place]  Recent Flowsheet Documentation  Taken 3/7/2023 2019 by Gerber Metcalf RN  Trust Relationship/Rapport:   care explained   choices provided   emotional support provided   empathic listening provided   questions answered   questions encouraged   reassurance provided   thoughts/feelings acknowledged   Goal Outcome Evaluation:    Plan of Care Reviewed With: patient        Patient remains isolative and withdrawn to room all evening only coming out into the milieu during requests and to use the shower. Patient upon approach flat in affect, calm and cooperative with verbal assessment. Patient continues to report command auditory hallucinations to self harm as well as thoughts of SI with firearm outside of the hospital, with no plan in place to act or acquire a firearm. Patient currently contracts for safety indicating no plan to act in the hospital. Patient reporting continued anxiety 5/10 and depression 9/10. Patient stating understanding and acceptance with care plan. Patient accepting of HS medications as well as PRN Maalox for indigestion after dinner. Patient denies any side effects to the medications. Patient unable to identify any therapeutic response to the medications of ECT treatment. Patient with prompting completed ADL's including showering  this evening. Patient after the shower reported slight relief of anxiety and depression. RN writer attempted to encourage further increased activity but patient remained isolative.            Peripheral vascular disease

## 2023-03-23 NOTE — ED ADULT NURSE NOTE - CARDIO WDL
What Is The Reason For Today's Visit?: Full Body Skin Examination
What Is The Reason For Today's Visit? (Being Monitored For X): the re-examination of lesions previously examined
Normal rate, regular rhythm, normal S1, S2 heart sounds heard.

## 2023-04-04 NOTE — ED PROVIDER NOTE - NS_EDPROVIDERDISPOUSERTYPE_ED_A_ED
Doxepin Counseling:  Patient advised that the medication is sedating and not to drive a car after taking this medication. Patient informed of potential adverse effects including but not limited to dry mouth, urinary retention, and blurry vision.  The patient verbalized understanding of the proper use and possible adverse effects of doxepin.  All of the patient's questions and concerns were addressed. Attending Attestation (For Attendings USE Only)...

## 2023-04-14 NOTE — DISCHARGE NOTE ADULT - %
PRINCIPAL DISCHARGE DIAGNOSIS  Diagnosis: NPH (normal pressure hydrocephalus)  Assessment and Plan of Treatment: admitted 4/11 for lumbar drain insertion for NPH (normal pressure hydrocephalus).  Please follow up with Dr Campbell in office in 1-2 weeks for wound check and to schedule placement of ventriculoperitoneal shunt.      SECONDARY DISCHARGE DIAGNOSES  Diagnosis: HLD (hyperlipidemia)  Assessment and Plan of Treatment: Please continue medications and follow up with your primary care physician within 1-2 weeks.    
bilateral upper extremity ROM was WNL (within normal limits)/bilateral lower extremity was ROM was WNL (within normal limits)
52

## 2023-05-08 NOTE — PATIENT PROFILE ADULT. - ARE SIGNIFICANT INDICATORS COMPLETE.
Please call patient to schedule a physical, she cannot get anymore refills until she is seen. Thank you   Yes

## 2023-05-09 NOTE — ED ADULT TRIAGE NOTE - AS HEIGHT TYPE
Patient: Esthela Katz is a 52 y.o. male who presents today with the following Chief Complaint(s):  Chief Complaint   Patient presents with    Pharyngitis     Left side hurts worse, especially in mornings, dry feeling, x1 day          HPI  Sore throat, this morning felt dry- states the left side feels like a burn. No other symptoms. No meds taken. Current Outpatient Medications   Medication Sig Dispense Refill    azithromycin (ZITHROMAX) 250 MG tablet Take 2 tablets on day 1 and 1 tablet day 2-5 1 packet 0    Cholecalciferol (VITAMIN D3) 50 MCG (2000 UT) TABS TAKE 1 TABLET BY MOUTH EVERY DAY 30 tablet 12    tamsulosin (FLOMAX) 0.4 MG capsule TAKE 1 CAPSULE BY MOUTH EVERY DAY 30 capsule 12    ibuprofen (ADVIL;MOTRIN) 800 MG tablet Take 1 tablet by mouth every 8 hours as needed for Pain or Fever 90 tablet 1    mometasone (ELOCON) 0.1 % cream Apply topically twice a day x 1 week then once a day then as needed 1 each 0    Multiple Vitamin (THERA-TABS) TABS TAKE 1 TABLET BY MOUTH EVERY DAY 30 tablet 12    fenofibrate (TRICOR) 145 MG tablet Take 1 tablet by mouth in the morning. 30 tablet 12    fluticasone (FLONASE) 50 MCG/ACT nasal spray SPRAY 2 SPRAYS INTO EACH NOSTRIL EVERY DAY 1 Bottle 5    clobetasol (TEMOVATE) 0.05 % ointment APPLY TO AFFECTED AREA TWICE A DAY 15 g 1     No current facility-administered medications for this visit. Patient's past medical history, surgical history, family history, medications,  andallergies  were all reviewed and updated as appropriate today. Review of Systems   Constitutional:  Negative for fever. HENT:  Positive for sore throat. Respiratory:  Negative for cough. Gastrointestinal:  Negative for nausea. Neurological:  Negative for headaches. Physical Exam  Vitals and nursing note reviewed. Constitutional:       Appearance: Normal appearance. He is well-developed. HENT:      Head: Normocephalic and atraumatic.       Right Ear: Tympanic membrane and
stated

## 2023-05-24 NOTE — ED ADULT NURSE REASSESSMENT NOTE - TEMPLATE LIST FOR HEAD TO TOE ASSESSMENT
General completely reflects the service(s) I personally performed and the decisions made by me.

## 2023-06-08 NOTE — H&P ADULT - PROBLEM SELECTOR PROBLEM 6
This document is complete and the patient is ready for discharge. Type 1 diabetes mellitus with hyperglycemia

## 2023-06-12 NOTE — CONSULT NOTE ADULT - HEIGHT IN CM
162.56 Otezla Pregnancy And Lactation Text: This medication is Pregnancy Category C and it isn't known if it is safe during pregnancy. It is unknown if it is excreted in breast milk.

## 2023-06-29 NOTE — H&P CARDIOLOGY - PEDAL EDEMA TYPE
----- Message from [place] Proctor LMSW sent at 6/29/2023  1:23 PM CDT -----  Regarding: Next week appointment  Patient is being discharged home today.  Patient reports Dr Anthony wanted to see her next week.  Could you please schedule her an appointment for next week.  She believes 7/13/2023 is too far out.    Thanks  [place]    
Called and spoke with pt about rescheduling appointment for callous follow up with . pt stated  wants schedule appointment for next week. explained to pt we can reschedule 07/13 appointment to 07/06 at 10:15am. Pt agreed and gave verbal understanding. Call ended   
non-pitting/LLE and Rt THumb as HPI

## 2023-07-27 NOTE — ED ADULT TRIAGE NOTE - CCCP TRG CHIEF CMPLNT
Pharmacist Clinic: Diabetes Management  Luis Greene is a 84 y.o. male was referred to Clinical Pharmacy Team for diabetes management. At last visit, no medication changes were made. Since last visit, patient stopped taking januvia     Referring Provider: Christopher D'Amico, DO     HISTORY OF PRESENT ILLNESS  Patient is a type 2 diabetic, his DM is currently complicated with steroid injections for back pain. In his lifetime he has gotten 4 shots. Currenlty, he is getting [facility]uv[facility], he is paying $10/month, he believes he has somewhere between 3-6 months left on this program. Last A1c came back elevated, but patient knew this would happen as his insulin was adjusted 2 months prior.      Diet:   - 1-2 snacks throughout the day (apple, popcorn, chips, sandwich)   - Dinner: varies, pasta, soup, take out     LAB REVIEW   Glucose (mg/dL)   Date Value   07/14/2023 145 (H)   06/26/2023 133 (H)   06/22/2023 226 (H)     Hemoglobin A1C (%)   Date Value   07/14/2023 7.5 (A)   04/19/2023 9.7 (H)   02/03/2023 9.6 (H)   08/22/2022 8.3 (A)   12/20/2021 7.5 (H)     Bicarbonate (mmol/L)   Date Value   07/14/2023 26   06/26/2023 27   06/22/2023 25     Urea Nitrogen (mg/dL)   Date Value   07/14/2023 28 (H)   06/26/2023 21   06/22/2023 31 (H)     Creatinine (mg/dL)   Date Value   07/14/2023 1.20   06/26/2023 1.03   06/22/2023 1.16     Lab Results   Component Value Date    HGBA1C 7.5 (A) 07/14/2023    HGBA1C 9.7 (H) 04/19/2023    HGBA1C 9.6 (H) 02/03/2023     Lab Results   Component Value Date    CHOL 77 08/22/2022    CHOL 101 09/17/2019     Lab Results   Component Value Date    HDL 29.5 (A) 08/22/2022    HDL 33.5 (A) 09/17/2019     Lab Results   Component Value Date    TRIG 53 08/22/2022    TRIG 78 09/17/2019       DIABETES ASSESSMENT    CURRENT PHARMACOTHERAPY  - metformin 500 mg 2 tabs by mouth twice daily   - soliqua 15 units under the skin once daily    SECONDARY PREVENTION  - Statin? Yes  - ACE-I/ARB?  Yes    SMBG MEASUREMENTS:   7/17: 115  7/18: 132  7/19: 120  7/20: 118  7/21: 118  7/22: 130  7/23: 124  7/24: 113  7/25: 99  7/26: 119  7/27: 122    DISCUSSION:   - Good job testing your blood sugar, your numbers look very good!   - Your last A1c came back at 7.5%, this is a great improvement from your 9.7%   - Patient denies any symptoms of hypoglycemia, he denies feeling shakey, sweaty, and lightheadedness   - If he experiences these symptoms, we discussed eating/drinking something sweet or taking glucose tablets  - We will talk again in 2 weeks, as long as you don't have any low blood sugars, we will not need to talk as frequently    RECOMMENDATIONS/PLAN  1. Patients diabetes is improving with most recent A1c of 7.5% (goal < 7 %).   - Continue all meds under the continuation of care with the referring provider and clinical pharmacy team    Clinical Pharmacist follow up: 2 weeks, [phone]    Thank you,  Leeann Page, PharmD    Verbal consent to manage patient's drug therapy was obtained from the patient. They were informed they may decline to participate or withdraw from participation in pharmacy services at any time.     l leg swelling

## 2023-08-14 NOTE — H&P ADULT - PROBLEM/PLAN-6
Phone call to patient and provided recommendations per Dr. Echeverria. Patient verbalized understanding and had no questions at time of phone call. Order placed for u/a with c&s if indicated per Dr. Echeverria.    DISPLAY PLAN FREE TEXT

## 2023-08-15 NOTE — PROGRESS NOTE ADULT - PROBLEM/PLAN-6
The procedure was performed independently
DISPLAY PLAN FREE TEXT

## 2023-09-11 NOTE — ED PROVIDER NOTE - DISPOSITION TYPE
Daily Note     Today's date: 2023  Patient name: Brisa Gutierrez  : 1947  MRN: [id number]  Referring provider: Arlin Littlejohn DO  Dx:   Encounter Diagnosis     ICD-10-CM    1. Tear of left supraspinatus tendon  M75.102       2. Chronic left shoulder pain  M25.512     G89.29       3. Closed nondisplaced fracture of greater tuberosity of left humerus with routine healing, subsequent encounter  S42.521D                      Subjective: Pt returns to PT from surgery with Dr. Morgan Washington. She states that she isn't feeling the best. Pt will be here for a week prior to going on a cruise. Objective: See treatment diary below      Assessment: Pt returns to PT 2.5 weeks s/p autologus fat graft into bilateral breasts with Dr. Morgan Washington. No restrictions per patient. Moderate glenohumeral joint stiffness present upon observation today. Tolerated treatment well. Patient would benefit from continued PT. Plan: Continue per plan of care.       Precautions: MS, Gait dysfunction, Insomnia, Osteoporosis, Peripheral polyneuropathy, Hx of Breast CA, R IDALIA in - Dr. Drake , Left femur IM nailing      Manuals  9           PROM of the left shoulder 8' 10'           Left scapular mobilizations 5' 3'                                     Neuro Re-Ed             Isometrics             Scap retraction              No Money TB             Serratus punch 5x c/ PT assist 20x c/ PT assist           Sidelying shoulder ER 10x 10x with cueing            TB Rows  ytb 2x10            TB extension  ytb 2x10            TB IR/ER  2x10 ytb            Ther Ex             Table slides flexion  C/ PB x 30           Table slides abduction             Shoulder ER Stretch  supine c/ cane 10x10" supine c/ cane 10x10           Shoulder extension stretch              Pulley 5' 5'                                                   Ther Activity                                       Gait Training Modalities                                       1:1 with PT from [id number] ADMIT

## 2023-09-18 NOTE — PROGRESS NOTE ADULT - ASSESSMENT
64F with PMH of ESRD on HD (TTS), CAD s/p stents, CHF (EF 35%) with severe AS, T1DM, COPD, CVA with no residuals p/w acute onset SOB and LE pain, admitted for RLL PNA and volume overload in setting of missed HD      Community acquired bacterial pneumonia.   - pt with SOB and cough, with CXR showing new RLL opacity c/w PNA  - continue ceftriaxone and azithromycin   - f/u cultures  - monitor fever curve.   - pulmonary follow    ESRD (end stage renal disease) on dialysis.  - ESRD on HD, missed HD session earlier today  - Nephrology follow.     Acute on chronic systolic heart failure.   - SOB, elevated BNP and lower extremity edema c/w likely HF exacerbation, with last EF 35% and severe AS   - unable to diurese 2/2 anuria   - fluid removal via HD per renal   - monitor I/Os, daily weights  - monitor electrolytes   - c/w BB and ACEi.  - cardiology follow     Pain in both lower extremities.  - acute on chronic b/l LE pain, L>R; currently improved in ED without intervention - likely in setting of PAD. Also with significant swelling in LEs  - US dopplers to r/o DVT   - fluid removal with HD as above   - tylenol for pain control  - outpt vascular f/u.     Type 1 diabetes mellitus with chronic kidney disease on chronic dialysis.   - c/w lantus 10u qhs, 2u premeals TID   - low sliding scale insulin  - FS ac and hs.     CAD (coronary artery disease).  - c/w ASA and plavix.  - cardiology follow    Pierce Viera MD pager [phone] Double Island Pedicle Flap Text: The defect edges were debeveled with a #15 scalpel blade.  Given the location of the defect, shape of the defect and the proximity to free margins a double island pedicle advancement flap was deemed most appropriate.  Using a sterile surgical marker, an appropriate advancement flap was drawn incorporating the defect, outlining the appropriate donor tissue and placing the expected incisions within the relaxed skin tension lines where possible.    The area thus outlined was incised deep to adipose tissue with a #15 scalpel blade.  The skin margins were undermined to an appropriate distance in all directions around the primary defect and laterally outward around the island pedicle utilizing iris scissors.  There was minimal undermining beneath the pedicle flap.

## 2023-09-28 NOTE — ED PROVIDER NOTE - CPE EDP GASTRO NORM
• Home regimen: Oxycodone 5 mg twice daily as needed. Tylenol 650 mg every 8 hours as needed. Gabapentin 600 mg 3 times daily. Medical marijuana. • Per daughter, patient was overusing Tylenol over-the-counter. • Pain mostly from lumbar radiculopathy. • Impaired ambulatory dysfunction second to pain.     Plan:  • Continue gabapentin 300 3 times daily, scheduled oxycodone 5 mg 3 times daily, as needed Tylenol 650 mg every 6 hours.   As needed fentanyl 50 mcg every 2 hours for severe pain - - -

## 2023-10-01 PROBLEM — J44.89 OTHER SPECIFIED CHRONIC OBSTRUCTIVE AIRWAYS DISEASE: Status: ACTIVE | Noted: 2018-06-29

## 2023-10-10 NOTE — ED PROVIDER NOTE - ATTESTATION, MLM
ANTICOAGULATION MANAGEMENT     Buck Sinclair 80 year old male is on warfarin with supratherapeutic INR result. (Goal INR 2.0-3.0)    Recent labs: (last 7 days)     10/10/23  0000   INR 3.1*       ASSESSMENT     Source(s): Chart Review and Patient/Caregiver Call - ana Keita     Warfarin doses taken: Warfarin taken as instructed  Diet: No new diet changes identified  Medication/supplement changes: None noted  New illness, injury, or hospitalization: No  Signs or symptoms of bleeding or clotting: No  Previous result: Therapeutic last 2(+) visits  Additional findings: None       PLAN     Recommended plan for no diet, medication or health factor changes affecting INR     Dosing Instructions: Continue your current warfarin dose with next INR in 1 week   -will have some extra greens tonight     Summary  As of 10/10/2023      Full warfarin instructions:  3.75 mg every Tue; 2.5 mg all other days   Next INR check:  10/17/2023               Telephone call with Neela who verbalizes understanding and agrees to plan    Patient to recheck with home meter    Education provided:   Please call back if any changes to your diet, medications or how you've been taking warfarin    Plan made per [facility] anticoagulation protocol    Lottie Sotelo, RN  Anticoagulation Clinic  10/10/2023    _______________________________________________________________________     Anticoagulation Episode Summary       Current INR goal:  2.0-3.0   TTR:  79.5 % (11.6 mo)   Target end date:  Indefinite   Send INR reminders to:  ANTICOAG HOME MONITORING    Indications    Persistent Atrial Fibrillation (Resolved) [I48.91]  Paroxysmal atrial fibrillation (H) [I48.0]  Persistent atrial fibrillation (H) [I48.19]             Comments:  Home monitor ( [employer] )managed by exception             Anticoagulation Care Providers       Provider Role Specialty Phone number    Erica Hansen APRN CNP Referring Cardiovascular Disease [phone]    Domo Garrett MD  Referring Cardiovascular Disease [phone]    Everett Renee MD  Cardiovascular Disease [phone]               I have reviewed and confirmed nurses' notes for patient's medications, allergies, medical history, and surgical history.

## 2023-10-31 NOTE — DISCHARGE NOTE NURSING/CASE MANAGEMENT/SOCIAL WORK - NSDCPECAREGIVERED_GEN_ALL_CORE
Reviewed patient's medical and surgical history in electronic record and with patient at the bedside. All questions regarding procedure answered. Scope verified using 2 person system. Family in waiting room. Yes

## 2023-12-20 NOTE — DISCHARGE NOTE PROVIDER - NSDCACTIVITY_GEN_ALL_CORE
Doing well, got some sleep overnight    Problem: POSTPARTUM  Goal: Optimize infant feeding at the breast  Description: INTERVENTIONS:  - Initiate breast feeding within first hour after birth. - Monitor effectiveness of current breast feeding efforts. - Assess support systems available to mother/family.  - Identify cultural beliefs/practices regarding lactation, letdown techniques, maternal food preferences. - Assess mother's knowledge and previous experience with breast feeding.  - Provide information as needed about early infant feeding cues (e.g., rooting, lip smacking, sucking fingers/hand) versus late cue of crying.  - Discuss/demonstrate breast feeding aids (e.g., infant sling, nursing footstool/pillows, and breast pumps). - Encourage mother/other family members to express feelings/concerns, and actively listen. - Educate father/SO about benefits of breast feeding and how to manage common lactation challenges. - Recommend avoidance of specific medications or substances incompatible with breast feeding.  - Assess and monitor for signs of nipple pain/trauma. - Instruct and provide assistance with proper latch. - Review techniques for milk expression (breast pumping) and storage of breast milk. Provide pumping equipment/supplies, instructions and assistance, as needed. - Encourage rooming-in and breast feeding on demand.  - Encourage skin-to-skin contact. - Provide LC support as needed. - Assess for and manage engorgement. - Provide breast feeding education handouts and information on community breast feeding support.    Outcome: Progressing No heavy lifting/straining

## 2023-12-27 NOTE — ED ADULT TRIAGE NOTE - ADDITIONAL SAFETY/BANDS...
--------------  CONTINUED STAY REVIEW----REQUESTING ADDITIONAL DAYS 12/26 12/27      Payor: [facility]  Subscriber #:  [id number]  Authorization Number: [id number]    Admit date: 12/18/23  Admit time: 12:56 PM    Admitting Physician: Bradley Dash MD  Attending Physician:  Jaxon Nick DO  Primary Care Physician: Victor Manuel Cleaning MD    12/26   [facility] Hospitalist Progress Note      CC: Hospital Follow up     PCP: Victor Manuel Cleaning MD         Assessment/Plan:      Mr. Vines is a 77 year old male with PMH BPH , CHF / ICM EF 30% , CAD s/p CABG x3, Moderate MR, HTN, HLD, ESRD on HD M/W/F, Anemia, Gastritis, who presents with SOB. Admitted for fluid overload and Influenza A.  Hypoxic and hypercapnic respiratory failure.      Acute Hypoxic Respiratory Failure  Pulmonary edema  ESRD on HD M/W/F  - positive influenza   - HD per renal, received on 12/25  - s/p recent AV fistula repair by vascular surgery in September 2023  - bipap with all sleep  - last ABG done 12/24  - steroid burst per pulmonary ended 12/24     Encephalopathy, improved today  -maybe responded to dialysis, today BUN less and mentation seems more clear  -monitor closely  -mobilize, needs therapy. He is very deconditioned      Influenza A positive   - cough for over a month but SOB more acute  - no fevers or Leukocytosis  - renally dose Tamiflu   - Ceftriaxone and steroids started 12/22/23  - monitor for worsening hypoxia     Troponin Elevation   - no chest pain   - EKG with concern for LBBB  - cardiology on consult     CAD s/p CABG x3   Ischemic cardiomyopathy EF 30% without acute exacerbation of congestive heart failure  Chronic Systolic heart failure  HTN  - Continue home metoprolol, hydralazine  - Not on ACE or ARB due to renal disease  - Does not use diuretics     Anemia   Thrombocytopenia   Gastritis   - s/p EGD 11/7 with some mild gastritis, no active bleeding  - s/p colonoscopy 11/8 with polyps but no active bleeding, will need to  repeat colonoscopy as outpatient  - PPI BID was not taking   - on prevacid here      Type 2 diabetes with hyperglycemia  Diabetic nephropathy  - gabapentin (hold for now)  - ISS, meal time and basal insulin   - Controlled renal diet  - Continue home aspirin     Hyperlipidemia  -can hold his statin      BPH  -can hold his finasteride for now  -on flomax      FN:  Diet: NG removed 12/24 by patient, Continue to assess oral and nutritional intake. Speech following      DVT Prophy: HSQ      Dispo: PCU, needs therapy. He is very deconditioned. Dispo will need to be HODA once stabilized. Mentation today is improved     Jaxon [facility] Hospitalist       Subjective:      More talkative to me. Seems to be alert. Looks deconditioned.      OBJECTIVE:     Blood pressure 126/66, pulse 70, temperature 97.1 °F (36.2 °C), temperature source Oral, resp. rate 16, weight 242 lb (109.8 kg), SpO2 100%.     Temp:  [96 °F (35.6 °C)-97.1 °F (36.2 °C)] 97.1 °F (36.2 °C)  Pulse:  [45-79] 70  Resp:  [11-20] 16  BP: ()/(57-98) 126/66  SpO2:  [89 %-100 %] 100 %  FiO2 (%):  [30 %] 30 %        Intake/Output:     Intake/Output Summary (Last 24 hours) at 12/26/2023 0937  Last data filed at 12/26/2023 0600      Gross per 24 hour   Intake 150 ml   Output 850 ml   Net -700 ml                 Last 3 Weights   12/18/23 1258 242 lb (109.8 kg)   11/27/23 1951 252 lb (114.3 kg)   11/13/23 0700 245 lb 4.8 oz (111.3 kg)   11/12/23 0519 244 lb 8 oz (110.9 kg)   11/11/23 1300 246 lb 14.4 oz (112 kg)   11/11/23 0539 238 lb 1.6 oz (108 kg)   11/08/23 1410 234 lb (106.1 kg)   11/07/23 0619 234 lb 1.6 oz (106.2 kg)   11/06/23 1744 239 lb (108.4 kg)         Exam   General: awake  Lungs: clear   Heart: Regular rate and rhythm  Abdomen: soft, non tender  Extremities: No edema  Skin: no new rash, normal color  Psych: appropriate affect      Data Review:       Labs:               Recent Labs   Lab 12/21/23  0341 12/22/23  0421 12/23/23  2593  12/24/23  0709 12/25/23 0428 12/26/23  0327   RBC 3.13* 3.46* 3.44* 3.56* 3.56* 3.25*   HGB 9.7* 10.2* 10.3* 10.7* 10.6* 9.7*   HCT 30.9* 33.7* 32.3* 32.8* 33.3* 30.9*   MCV 98.7 97.4 93.9 92.1 93.5 95.1   MCH 31.0 29.5 29.9 30.1 29.8 29.8   MCHC 31.4 30.3* 31.9 32.6 31.8 31.4   RDW 16.0* 15.9* 15.3* 14.6 14.5 14.6   NEPRELIM 6.47 6.33 4.78  --   --   --    WBC 8.7 8.9 5.3 9.8 9.0 8.2   .0* 135.0* 155.0 193.0 212.0 178.0                  Recent Labs   Lab 12/24/23  0709 12/25/23 0428 12/26/23 0327   * 236* 211*   BUN 74* 108* 62*   CREATSERUM 8.46* 9.93* 7.67*   EGFRCR 6* 5* 7*   CA 8.5* 8.5* 8.3*   * 135* 134*   K 4.6 5.2* 4.6   CL 94* 92* 96*   CO2 25.0 24.0 24.0               Recent Labs   Lab 12/24/23  0709 12/25/23 0428 12/26/23 0327   ALB 3.9 3.9 4.0            Imaging:  No results found.        Meds:       insulin aspart  1-5 Units Subcutaneous TID CC    aspirin  81 mg Per NG Tube Daily    lansoprazole  30 mg Per NG Tube QAM AC    [Held by provider] finasteride  5 mg Per NG Tube Daily    metoprolol tartrate  100 mg Per NG Tube 2x Daily(Beta Blocker)    insulin detemir  25 Units Subcutaneous Daily    cefTRIAXone  2 g Intravenous Q24H    heparin  5,000 Units Subcutaneous Q8H YOLANDA    sevelamer carbonate  800 mg Oral TID CC    tamsulosin  0.8 mg Oral Daily    hydrALAZINE  50 mg Oral TID    [Held by provider] gabapentin  100 mg Oral BID    [Held by provider] rosuvastatin  10 mg Oral Nightly       dextrose 10%        metoclopramide, dextrose 10%, lipase-protease-amylase (Lip-Prot-Amyl) **AND** sodium bicarbonate, traMADol, ipratropium-albuterol, glucose **OR** glucose **OR** glucose-vitamin C **OR** dextrose **OR** glucose **OR** glucose **OR** glucose-vitamin C, acetaminophen, polyethylene glycol (PEG 3350), sennosides, bisacodyl, fleet enema, ondansetron, albuterol        12/27    [facility] Hospitalist Progress Note      CC: Hospital Follow up     PCP: Victor Manuel Cleaning MD          Assessment/Plan:      Mr. Vines is a 77 year old male with PMH BPH , CHF / ICM EF 30% , CAD s/p CABG x3, Moderate MR, HTN, HLD, ESRD on HD M/W/F, Anemia, Gastritis, who presents with SOB. Admitted for fluid overload and Influenza A.  Hypoxic and hypercapnic respiratory failure.      Acute Hypoxic Respiratory Failure  Pulmonary edema  ESRD on HD M/W/F  - positive influenza   - HD per renal, next session this AM  - s/p recent AV fistula repair by vascular surgery in September 2023  - bipap with all sleep  - last ABG done 12/24  - steroid burst per pulmonary ended 12/24     Encephalopathy, improved, but intermittent waxes and wanes  -monitor closely  -mobilize, needs therapy. He is very deconditioned      Influenza A positive   - cough for over a month but SOB more acute  - no fevers or Leukocytosis  - renally dose Tamiflu   - Ceftriaxone and steroids started 12/22/23  - monitor for worsening hypoxia     Troponin Elevation   - no chest pain   - EKG with concern for LBBB  - cardiology on consult     CAD s/p CABG x3   Ischemic cardiomyopathy EF 30% without acute exacerbation of congestive heart failure  Chronic Systolic heart failure  HTN  - Continue home metoprolol, hydralazine  - Not on ACE or ARB due to renal disease  - Does not use diuretics     Anemia   Thrombocytopenia   Gastritis   - s/p EGD 11/7 with some mild gastritis, no active bleeding  - s/p colonoscopy 11/8 with polyps but no active bleeding, will need to repeat colonoscopy as outpatient  - PPI BID was not taking   - on prevacid here      Type 2 diabetes with hyperglycemia  Diabetic nephropathy  - gabapentin (hold for now)  - ISS, meal time and basal insulin   - Controlled renal diet  - Continue home aspirin     Hyperlipidemia  -can hold his statin      BPH  -can hold his finasteride for now  -on flomax      Diet: NG removed 12/24 by patient, Continue to assess oral and nutritional intake. Speech following      DVT Prophy: HSQ      Dispo: He is very  Additional Safety/Bands: deconditioned. Dispo will need to be HODA once stabilized. Continue therapy assessments      [facility] Hospitalist       Subjective:      Getting dialysis. Awake and talking to me. Knows his nephrologist is Dr. Parker when asked.      OBJECTIVE:     Blood pressure 103/61, pulse 56, temperature 97.4 °F (36.3 °C), temperature source Temporal, resp. rate 13, weight 242 lb (109.8 kg), SpO2 96%.     Temp:  [97.4 °F (36.3 °C)-97.6 °F (36.4 °C)] 97.4 °F (36.3 °C)  Pulse:  [56-73] 56  Resp:  [13-21] 13  BP: ()/(50-78) 103/61  SpO2:  [91 %-98 %] 96 %        Intake/Output:     Intake/Output Summary (Last 24 hours) at 12/27/2023 0941  Last data filed at 12/27/2023 0600      Gross per 24 hour   Intake 125 ml   Output 0 ml   Net 125 ml                 Last 3 Weights   12/18/23 1258 242 lb (109.8 kg)   11/27/23 1951 252 lb (114.3 kg)   11/13/23 0700 245 lb 4.8 oz (111.3 kg)   11/12/23 0519 244 lb 8 oz (110.9 kg)   11/11/23 1300 246 lb 14.4 oz (112 kg)   11/11/23 0539 238 lb 1.6 oz (108 kg)   11/08/23 1410 234 lb (106.1 kg)   11/07/23 0619 234 lb 1.6 oz (106.2 kg)   11/06/23 1744 239 lb (108.4 kg)         Exam   General: awake  Lungs: clear   Heart: Regular rate and rhythm  Abdomen: soft, non tender  Extremities: No edema  Skin: no new rash, normal color  Psych: appropriate affect      Data Review:       Labs:                Recent Labs   Lab 12/21/23  0341 12/22/23  0421 12/23/23  0408 12/24/23  0709 12/25/23  0428 12/26/23  0327 12/27/23  0302   RBC 3.13* 3.46* 3.44*   < > 3.56* 3.25* 3.21*   HGB 9.7* 10.2* 10.3*   < > 10.6* 9.7* 9.7*   HCT 30.9* 33.7* 32.3*   < > 33.3* 30.9* 29.5*   MCV 98.7 97.4 93.9   < > 93.5 95.1 91.9   MCH 31.0 29.5 29.9   < > 29.8 29.8 30.2   MCHC 31.4 30.3* 31.9   < > 31.8 31.4 32.9   RDW 16.0* 15.9* 15.3*   < > 14.5 14.6 14.5   NEPRELIM 6.47 6.33 4.78  --   --   --   --    WBC 8.7 8.9 5.3   < > 9.0 8.2 7.3   .0* 135.0* 155.0   < > 212.0 178.0 179.0    < > = values in  this interval not displayed.                  Recent Labs   Lab 12/25/23  0428 12/26/23 0327 12/27/23  0302   * 211* 181*   * 62* 91*   CREATSERUM 9.93* 7.67* 9.39*   EGFRCR 5* 7* 5*   CA 8.5* 8.3* 8.2*   * 134* 133*   K 5.2* 4.6 4.7   CL 92* 96* 96*   CO2 24.0 24.0 25.0                Recent Labs   Lab 12/24/23  0709 12/25/23  0428 12/26/23 0327 12/27/23  0302   ALB 3.9 3.9 4.0 3.9            Imaging:  No results found.        Meds:       sevelamer carbonate  1,600 mg Oral TID CC    insulin aspart  1-5 Units Subcutaneous TID CC    aspirin  81 mg Per NG Tube Daily    lansoprazole  30 mg Per NG Tube QAM AC    [Held by provider] finasteride  5 mg Per NG Tube Daily    metoprolol tartrate  100 mg Per NG Tube 2x Daily(Beta Blocker)    insulin detemir  25 Units Subcutaneous Daily    heparin  5,000 Units Subcutaneous Q8H YOLANDA    tamsulosin  0.8 mg Oral Daily    [Held by provider] hydrALAZINE  50 mg Oral TID    [Held by provider] gabapentin  100 mg Oral BID    [Held by provider] rosuvastatin  10 mg Oral Nightly            metoclopramide, traMADol, ipratropium-albuterol, glucose **OR** glucose **OR** glucose-vitamin C **OR** dextrose **OR** glucose **OR** glucose **OR** glucose-vitamin C, acetaminophen, polyethylene glycol (PEG 3350), sennosides, bisacodyl, fleet enema, ondansetron, albuterol                         MEDICATIONS ADMINISTERED IN LAST 1 DAY:  albumin human (Albuminar) 25% injection 25 g       Date Action Dose Route User    12/26/2023 1836 New Bag 25 g Intravenous Jia Yanez RN          albumin human (Albuminar) 25% injection       Date Action Dose Route User    12/26/2023 1836 New Bag 25 g Intravenous Jia Yanez, MANJU          aspirin chewable tab 81 mg       Date Action Dose Route User    12/27/2023 1149 Given 81 mg Per NG Tube Sandrita Lambert RN          heparin (Porcine) 5000 UNIT/ML injection 5,000 Units       Date Action Dose Route User    12/27/2023 0559 Given 5,000 Units  Subcutaneous (Left Lower Abdomen) Juliana Marina RN    12/26/2023 2101 Given 5,000 Units Subcutaneous (Right Lower Abdomen) Juliana Marina RN          hydrALAZINE (Apresoline) tab 50 mg       Date Action Dose Route User    12/26/2023 1635 Given 50 mg Oral Jia Yanez RN          insulin aspart (NovoLOG) 100 Units/mL FlexPen 1-5 Units       Date Action Dose Route User    12/26/2023 1702 Given 1 Units Subcutaneous (Right Upper Arm) Jia Yanez RN          insulin detemir (Levemir) 100 UNIT/ML FlexPen/FlexTouch 25 Units       Date Action Dose Route User    12/27/2023 1149 Given 25 Units Subcutaneous (Left Lower Abdomen) Sandrita Lambert RN          lansoprazole (Prevacid Solutab) disintegrating tab 30 mg       Date Action Dose Route User    12/27/2023 0601 Given 30 mg Per NG Tube Juliana Marina RN          metoprolol tartrate (Lopressor) tab 100 mg       Date Action Dose Route User    12/26/2023 1635 Given 100 mg Per NG Tube Jia Yanez RN          sevelamer carbonate (Renvela) tab 1,600 mg       Date Action Dose Route User    12/26/2023 1635 Given 1,600 mg Oral Jia Yanez RN          tamsulosin (Flomax) cap 0.8 mg       Date Action Dose Route User    12/27/2023 1145 Given 0.8 mg Oral Sandrita Lambert RN            Vitals (last day)       Date/Time Temp Pulse Resp BP SpO2 Weight O2 Device O2 Flow Rate (L/min) Who    12/27/23 1200 -- 74 18 151/69 97 % -- Nasal cannula 2 L/min AS    12/27/23 1100 -- 71 14 140/63 87 % -- -- 2 L/min AS    12/27/23 1000 -- 76 22 121/87 85 % -- -- 2 L/min AS    12/27/23 0900 -- 64 16 120/97 90 % -- -- 2 L/min AS    12/27/23 0800 -- 68 16 106/62 92 % -- Nasal cannula 2 L/min AS    12/27/23 0700 -- 56 13 103/61 96 % -- Nasal cannula 2 L/min AS    12/27/23 0600 -- 57 14 124/72 96 % -- Nasal cannula 2 L/min     12/27/23 0400 -- 61 15 111/62 98 % -- Nasal cannula 2 L/min     12/27/23 0200 -- 62 15 111/62 93 % -- None (Room air) --     12/27/23 0000 97.4 °F (36.3 °C) 64 14 116/69 94 %  -- None (Room air) -- RK    12/26/23 2200 -- 65 17 118/78 93 % -- None (Room air) -- RK    12/26/23 2000 97.6 °F (36.4 °C) 64 14 95/63 93 % -- None (Room air) -- RK    12/26/23 1852 -- 65 13 105/55 -- -- -- -- SY    12/26/23 1846 -- 64 13 -- 92 % -- None (Room air) -- SY    12/26/23 1800 -- 61 16 77/50 94 % -- -- -- SY    12/26/23 1700 -- 66 16 100/64 95 % -- -- -- SY    12/26/23 1635 -- 66 14 125/61 92 % -- None (Room air) -- SY    12/26/23 1500 -- 64 13 114/67 95 % -- -- -- SY    12/26/23 1300 -- 67 16 130/62 95 % -- None (Room air) -- SY    12/26/23 1200 -- 66 14 120/67 92 % -- None (Room air) -- SY    12/26/23 1100 -- 64 16 121/60 91 % -- None (Room air) -- SY    12/26/23 1051 -- 73 21 -- 94 % -- None (Room air) -- SY    12/26/23 0900 -- 72 15 116/74 100 % -- -- -- SY    12/26/23 0800 97.1 °F (36.2 °C) 70 16 126/66 100 % -- Nasal cannula 5 L/min SY    12/26/23 0600 -- 59 11 113/71 100 % -- Bi-PAP -- RK    12/26/23 0400 96.9 °F (36.1 °C) 56 16 113/66 100 % -- -- -- RK    12/26/23 0200 -- 57 13 117/65 100 % -- Bi-PAP -- RK    12/26/23 0000 97.1 °F (36.2 °C) 62 18 122/67 100 % -- Bi-PAP -- RK

## 2024-01-01 NOTE — ED ADULT NURSE NOTE - NS ED NOTE ABUSE RESPONSE YN
Notified Resident at 2252 PM regarding change in condition.      Spoke with: Dale Barney MD    Orders were obtained.    Comments: Notified provider that luisa blood was found throughout infants stool.  Provider came to bedside to assess patient.  Xray ordered/obtained.  After xray full septic work-up ordered and abx started. Replogle placed to LIS and infant made NPO.         Yes

## 2024-01-02 NOTE — ED PROVIDER NOTE - CROS ED ENDOCRINE ALL NEG
Cardiothoracic Surgery  IN-PATIENT SERVICE   [facility]    Discharge Summary     Patient ID: Cele Camarena  :  1964   MRN: [id number]     ACCOUNT:  [id number]   Patient's PCP: Leena Murrieta APRN - CNP  Admit Date: 12/15/2023   Discharge Date:  2023  Length of Stay: 14  Code Status:  Prior  Admitting Physician: Deshawn Carreon MD  Discharge Physician: AQUILES CEBALLOS CNP     Active Discharge Diagnoses:     Hospital Problem Lists:  Principal Problem:    CAD in native artery  Active Problems:    S/P CABG x 3    Hypernatremia    KENRICK (acute kidney injury) (HCC)    Pneumonia of left lower lobe due to Klebsiella pneumoniae (HCC)    Biventricular congestive heart failure (HCC)    Allergy to multiple antibiotics    Primary hypertension    Acute kidney injury superimposed on CKD (HCC)    Bandemia    Stage 3 chronic kidney disease (HCC)  Resolved Problems:    * No resolved hospital problems. *      Admission Condition:  good     Discharged Condition: stable    Hospital Stay:     HPI:Ms Camarena is a 59y.o. female who presents to [facility] for possible CABG surgery. She discovered her heart disease during work up for knee replacement surgery. She has a history of OA, HLD, DMII, depression, CKDIII, PVD, and fibromyalgia. She denies CP, SOB, swelling in lower extremities, dizziness, syncope. She denies history of CVA, MI, AF, liver disease, or prior cancers.  Echo shows non-valvular heart disease with EF 40-45%. LHC shows significant stenosis in LAD, Rca, and Lcx without LM disease. She is not currently on OAC. She denies smoking, etoh use or illicit drug use     Hospital Course:  Patient had long complicated post-op course. Please refer to daily time line documented below       S/p thoracentesis, minimal output. Doing well this AM on HFNC, resting comfotably.   WBC downtrending, on moxifloxacin for PNA. Pleural Cx negative for 24hrs   Hypernatremia stable,  - - -

## 2024-01-28 NOTE — ED PROVIDER NOTE - PROGRESS NOTE DETAILS
room air pt with elevated beta-hydroxybutyrate, anion gap, though bicarb is 21, most likely c/w dka. contacted dr mildred xie, who routinely cares for patient at [facility]. updated him on findings. he recommends house endocrine consult at this point for insulin recommendations. house endocrine consulted, awaiting callback. - Jose Hinton MD central line placed by Dr. Hinton. spoke to respiratory for bipap as patient now acutely SOB with diffuse rales and frothy output. o2 sat 100% on NC. per MICU when US done significant B-lines seen bilaterally. spoke with endocrine feels Gtt will be required despite glucose. TBA MICU. - Heather Son PA-C discussed intubation with patients  and he states if required he would like to proceed with intubation. patient still mentating well and is 100% on RA, patients work of breathing improved. bipap is at bedside but will hold at this time because just had emesis and is high risk for aspiration - Heather Son PA-C central line placed without incident. s/p line placement pt re-evaluated, with bilat rales, increased wob. pt sat up s/p line placement, wob and rales improved. bipap at bedside but will hold application given vomiting, ams. discussed goals of care with . would like pt intubated if need be. no need at this time, will monitor closely. - Jose Hinton MD MICU was consulted. given elevated lactate, unclear source hypotension, decision was made to give abx, broad spectrum abx given. pro-calcitonin sent. MICU preparing bed. central line placed for pressor support, access, without incident. s/p line placement pt re-evaluated, with bilat rales, increased wob. pt sat up s/p line placement, wob and rales improved. bipap at bedside but will hold application as patient had episode of vomiting and breathing currently improved. discussed goals of care with . would like pt intubated if need be. no need at this time, will monitor closely. micu called, relayed finding of pulm edema to micu who will follow. pt being prepared, will be brought to unit soon. - Jose Hinton MD

## 2024-01-31 NOTE — DISCHARGE NOTE ADULT - CLICK TO LAUNCH ORM
Spoke to pt and scheduled scope. Prep instructions sent to pts mychart and placed in mail to pt. Pt verbalized understanding to all   
.

## 2024-02-13 NOTE — ED ADULT NURSE NOTE - NSIMPLEMENTINTERV_GEN_ALL_ED
Pt was at gymnastics this evening and did a back bend landing on her left arm the wrong way. She states that she heard some cracking followed by pain. Mother had advil around 2040. No obvious deformity noted. Radial pulses intact and good cap refill on affected arm/hand/fingers   Implemented All Universal Safety Interventions:  [place] to call system. Call bell, personal items and telephone within reach. Instruct patient to call for assistance. Room bathroom lighting operational. Non-slip footwear when patient is off stretcher. Physically safe environment: no spills, clutter or unnecessary equipment. Stretcher in lowest position, wheels locked, appropriate side rails in place.

## 2024-02-16 NOTE — DIETITIAN INITIAL EVALUATION ADULT. - NS AS NUTRI INTERV MEALS SNACK
Male General/healthful diet/Continue current diet. Monitor po intake, GI tolerance, weight and lab values.

## 2024-03-17 NOTE — ED ADULT NURSE NOTE - CAS TRG GEN SKIN CONDITION
Warm FAMILY HISTORY:  Father  Still living? Unknown  FH: diabetes mellitus, Age at diagnosis: Age Unknown    Mother  Still living? Unknown  FH: asthma, Age at diagnosis: Age Unknown

## 2024-04-28 NOTE — ASU PATIENT PROFILE, ADULT - FALL HARM RISK CONCLUSION
Wasted 20mL of  propofol  with Lucila ORNELAS. Med disposed of into the Rx Destroyer per protocol.    Primary Nurse eSignature: Electronically signed by Yesy Denney RN on 4/28/24 at 12:18 AM EDT    **SHARE this note so that the co-signing nurse is able to place an eSignature**    Co-signer eSignature: Electronically signed by Lucila Bhat RN on 4/28/24 at 12:19 AM EDT  Agree with above      Fall Risk

## 2024-05-18 NOTE — DIETITIAN INITIAL EVALUATION ADULT. - DIET TYPE
43874 consistent carbohydrate (evening snack)/renal replacement pts:no protein restr,no conc K & phos, low sodium/DASH/TLC (sodium and cholesterol restricted diet)

## 2024-05-23 NOTE — ED PROVIDER NOTE - TOBACCO USE
Unknown if ever smoked MEDICATIONS  (STANDING):  risperiDONE   Tablet 1 milliGRAM(s) Oral at bedtime    MEDICATIONS  (PRN):  acetaminophen     Tablet .. 650 milliGRAM(s) Oral every 6 hours PRN Moderate Pain (4 - 6)  aluminum hydroxide/magnesium hydroxide/simethicone Suspension 30 milliLiter(s) Oral every 6 hours PRN Dyspepsia  diphenhydrAMINE 50 milliGRAM(s) Oral every 6 hours PRN severe agitation  haloperidol     Tablet 5 milliGRAM(s) Oral every 6 hours PRN severe agitaiton  LORazepam     Tablet 2 milliGRAM(s) Oral every 6 hours PRN severe agitaiton

## 2024-05-24 NOTE — PROVIDER CONTACT NOTE (OTHER) - ACTION/TREATMENT ORDERED:
MD aware.  Follow sliding scale and standing order of insulin.  (Total of 5Units Humalog).  Reassess patient finger stick in 1 hour (1400)
explain risk and benefits
Continue to educate pt on vte prophylaxis. Pt can get metropolol and hydralazine as scheduled.
within normal limits

## 2024-05-31 NOTE — ED ADULT NURSE NOTE - PMH
ACS (acute coronary syndrome)  1/2015 - cath revealed 100% ostial stenosis not amenable to PCI - medical management  CAD (coronary artery disease)  s/p stents  CHF (congestive heart failure)  EF 40-45%  COPD (chronic obstructive pulmonary disease)    CVA (cerebral infarction)  with no residual, 8 yrs ago, prior to heart surgery - ST memory loss  Diabetes mellitus type 1  Insulin Dependent -  DKA, type 1  1/2015  ESRD (end stage renal disease)  dialysis  M, tue, thursday, saturday  Gout  past  Hyperlipidemia    Localized enlarged lymph nodes    Peripheral vascular disease  occluded left fem-pop bypass 5/2015  Subclavian vein stenosis, left  s/p stent  TIA (transient ischemic attack)  x 2 - 8-9 years ago prior to ASD/VSD repair Patient

## 2024-06-20 NOTE — CONSULT NOTE ADULT - SUBJECTIVE AND OBJECTIVE BOX
VASCULAR SURGERY CONSULT NOTE  --------------------------------------------------------------------------------------------    HPI:  63F PMH CAD c/b MI x8 & stents x7, CHF, COPD, CVA, DMT1, ESRD on HD (M,Tu,Th,Sa), Gout, HLD, PVD, Sublcavian Stensosis (L) sp stent. PSH ASD Repair, idania, fem-pop bypass. Last dc from hospt approx 2wk ago now comes to ED via ems complains of lower extr swelling maribell with pain. EMS report pt in mod pain necessitating fentanyl and that pt seemed very sleepy. Pt comes to ed complains of maribell lower extr pain onset yesterday "real bad". Pt has reported le pain in past without clear dx according to pt. No hx trauma. No fever chiil shortness of breath cheat pain. Pain improves with ambulation.     The patient had an ultrasound of her LUE AV fistula done on 5/17 that was notable for venous stenosis of the fistula with elevated peak velocities, and a partially thrombosed subclavian stent. Vascular surgery was consulted to evaluate.      PAST MEDICAL & SURGICAL HISTORY:  COPD (chronic obstructive pulmonary disease)  Localized enlarged lymph nodes  CHF (congestive heart failure): EF 40-45%  Subclavian vein stenosis, left: s/p stent  DKA, type 1: 1/2015  ACS (acute coronary syndrome): 1/2015 - cath revealed 100% ostial stenosis not amenable to PCI - medical management  TIA (transient ischemic attack): x 2 - 8-9 years ago prior to ASD/VSD repair  CAD (coronary artery disease): s/p stents  Gout: past  CVA (cerebral infarction): with no residual, 8 yrs ago, prior to heart surgery - ST memory loss  Peripheral vascular disease: occluded left fem-pop bypass 5/2015  Diabetes mellitus type 1: Insulin Dependent -  ESRD (end stage renal disease): dialysis  M, tue, thursday, saturday  Hyperlipidemia  Status post device closure of ASD: &quot;brenna&quot;  History of cardiac catheterization: 1/2015 - no intervention  S/P femoral-popliteal bypass surgery: L and R in 2013 with graft; 5/2015 CFA angioplasty left and ileofemoral endarterectomywith vein patch angioplasty of left fem-pop bypass graft  Multiple vascular surgery both leg, left fempop bypass revision 11/2015  AV (arteriovenous fistula): Left AV graft; revision with stent placement 2-3 years ago  S/P cholecystectomy      FAMILY HISTORY:  Family history of smoking  Family history of hypertension  Family history of cancer (Sibling)  Family history not pertinent as reviewed with the patient and family      ALLERGIES: No Known Allergies      CURRENT MEDICATIONS  MEDICATIONS (STANDING): aspirin enteric coated 81 milliGRAM(s) Oral daily  atorvastatin 40 milliGRAM(s) Oral at bedtime  clopidogrel Tablet 75 milliGRAM(s) Oral daily  dextrose 5%. 1000 milliLiter(s) IV Continuous <Continuous>  dextrose 50% Injectable 12.5 Gram(s) IV Push once  dextrose 50% Injectable 25 Gram(s) IV Push once  dextrose 50% Injectable 25 Gram(s) IV Push once  DULoxetine 60 milliGRAM(s) Oral daily  furosemide    Tablet 20 milliGRAM(s) Oral <User Schedule>  heparin  Injectable 5000 Unit(s) SubCutaneous every 12 hours  hydrALAZINE 25 milliGRAM(s) Oral three times a day  insulin glargine Injectable (LANTUS) 6 Unit(s) SubCutaneous at bedtime  insulin lispro (HumaLOG) corrective regimen sliding scale   SubCutaneous three times a day before meals  insulin lispro (HumaLOG) corrective regimen sliding scale   SubCutaneous at bedtime  insulin lispro Injectable (HumaLOG) 3 Unit(s) SubCutaneous three times a day before meals  lisinopril 40 milliGRAM(s) Oral daily  metoprolol succinate ER 50 milliGRAM(s) Oral daily  multivitamin 1 Tablet(s) Oral daily  sevelamer carbonate 1600 milliGRAM(s) Oral three times a day with meals    MEDICATIONS (PRN):acetaminophen   Tablet .. 650 milliGRAM(s) Oral every 6 hours PRN Temp greater or equal to 38.5C (101.3F), Mild Pain (1 - 3)  dextrose 40% Gel 15 Gram(s) Oral once PRN Blood Glucose LESS THAN 70 milliGRAM(s)/deciliter  glucagon  Injectable 1 milliGRAM(s) IntraMuscular once PRN Glucose LESS THAN 70 milligrams/deciliter  insulin lispro Injectable (HumaLOG) 2 Unit(s) SubCutaneous at bedtime PRN snack  oxyCODONE    5 mG/acetaminophen 325 mG 1 Tablet(s) Oral every 6 hours PRN Moderate Pain (4 - 6)    --------------------------------------------------------------------------------------------    Vitals:   T(C): 36.3 (05-20-19 @ 12:21), Max: 37 (05-19-19 @ 17:31)  HR: 69 (05-20-19 @ 13:58) (65 - 74)  BP: 156/67 (05-20-19 @ 13:58) (127/68 - 156/67)  RR: 18 (05-20-19 @ 13:58) (17 - 18)  SpO2: 98% (05-20-19 @ 13:58) (94% - 98%)  CAPILLARY BLOOD GLUCOSE      POCT Blood Glucose.: 90 mg/dL (20 May 2019 11:35)  POCT Blood Glucose.: 134 mg/dL (20 May 2019 07:48)  POCT Blood Glucose.: 124 mg/dL (19 May 2019 21:00)  POCT Blood Glucose.: 202 mg/dL (19 May 2019 16:25)      05-19 @ 07:01  -  05-20 @ 07:00  --------------------------------------------------------  IN:    Oral Fluid: 915 mL  Total IN: 915 mL    OUT:  Total OUT: 0 mL    Total NET: 915 mL      05-20 @ 07:01  -  05-20 @ 13:59  --------------------------------------------------------  IN:    Oral Fluid: 460 mL  Total IN: 460 mL    OUT:  Total OUT: 0 mL    Total NET: 460 mL    Height (cm): 160.02 (05-16 @ 17:19)  Weight (kg): 54.4 (05-16 @ 17:19)  BMI (kg/m2): 21.2 (05-16 @ 17:19)  BSA (m2): 1.56 (05-16 @ 17:19)      PHYSICAL EXAM:   General: NAD, Lying in bed comfortably  Neuro: A+Ox3  HEENT: NC/AT, EOMI  Neck: Soft, supple  Cardio: RRR, nml S1/S2  Resp: Good effort, CTA b/l  GI/Abd: Soft, NT/ND, no rebound/guarding, no masses palpated  LUE: WWP. Weakly palpable radial pulse. Strength & sensation intact. LUE AV fistula with palpable thrill.   --------------------------------------------------------------------------------------------    LABS    BMP (05-20 @ 07:14)             132<L>  |  91<L>   |  21    		Ca++ --      Ca 9.5                ---------------------------------( 125<H>		Mg 2.2                4.1     |  27      |  4.27<H>			Ph 4.5     BMP (05-19 @ 06:18)             133<L>  |  93<L>   |  15    		Ca++ --      Ca 9.2                ---------------------------------( 374<H>		Mg --                 4.2     |  28      |  2.63<H>			Ph --                  --------------------------------------------------------------------------------------------    MICROBIOLOGY      --------------------------------------------------------------------------------------------    IMAGING  < from: [facility] Duplex Hemodialysis Access, Left (05.17.19 @ 18:32) >  There is a long-standing surgically constructed dialysis access fistula   created by the anastomosis of the cephalic vein to the brachial artery at   the level of the antecubital fossa.    There is a high velocity, low resistance pattern of antegrade flow, as   expected, through the left brachial artery proximal to the surgical   anastomosis.    There is atheromatous intimal thickening and irregularity along the   course of the inflow brachial artery.    There is dilatation of the egresscephalic vein.    There is arterialization with atheromatous plaque protruding into the   lumen along the course of the egress cephalic vein.    There is a flow-limiting stenosis of the of the egress cephalic vein in   the proximal upper arm with velocities measuring as high as 524/254 cm/s.    There is a stent central to the stenosis in the proximal upper arm with   in-stent thrombus. However, the measured velocities through the stent are   not elevated.    Impression: There is a patent left brachial to cephalic vein dialysis   access fistula.  There is a flow-limiting stenosis of the egress vein in the proximal   upper arm with velocities measuring as high as 524/254 cm/s.  There is a partially thrombosed stent in the egress cephalic vein central   to the stenosis in the proximal upper arm.    < end of copied text > Home

## 2024-06-30 NOTE — DISCHARGE NOTE PROVIDER - HOSPITAL COURSE
64F with CAD Sp PTCA w/ stents, CHF, COPD, CVA, DMT1, ESRD on HD (M,Tu,Th,Sa), Gout, HLD, PVD, Subclavian Stenosis (L) s/p stent presents with ulceration over LUE AVF. On admission Dr. Burger was consulted secondary to need for continued HD post revision of fistula and medicine was consulted for pre-operative evaluation in which the patient was cleared for the OR. Cardiology consulted for cardiac risk stratification. The patient underwent a Left AV Fistula Revision on 5/18/20. Post operatively the patient was doing well on the surgical floor. POD 1 the patient resumed hemodialysis. Patient cleared to be discharge by attending surgeon, Dr. Hicks. Patient feels comfortable for discharge home. Pain is well controlled, tolerating a diet and was able to complete HD session. Patient is instructed to f/u with Dr. Elizalde within 1-2 weeks of discharge from the hospitalization. Glucose 207 (H) 65 - 100 mg/dL    Performed by: Rodger    POCT Glucose    Collection Time: 06/30/24 11:14 AM   Result Value Ref Range    POC Glucose 257 (H) 65 - 100 mg/dL    Performed by: Ania    POCT Glucose    Collection Time: 06/30/24  4:47 PM   Result Value Ref Range    POC Glucose 268 (H) 65 - 100 mg/dL    Performed by: Rodger        No results for input(s): \"COVID19\" in the last 72 hours.    Current Meds:  Current Facility-Administered Medications   Medication Dose Route Frequency    magnesium sulfate 2000 mg in 50 mL IVPB premix  2,000 mg IntraVENous Once    magnesium oxide (MAG-OX) tablet 400 mg  400 mg Oral Daily    insulin glargine (LANTUS) injection vial 7 Units  7 Units SubCUTAneous Nightly    insulin lispro (HUMALOG,ADMELOG) injection vial 2 Units  2 Units SubCUTAneous TID WC    bumetanide (BUMEX) injection 1 mg  1 mg IntraVENous BID    bisacodyl (DULCOLAX) suppository 10 mg  10 mg Rectal Daily PRN    mineral oil-hydrophilic petrolatum (AQUAPHOR) ointment   Topical BID PRN    [Held by provider] dextrose 5 % solution   IntraVENous Continuous    melatonin tablet 3 mg  3 mg Oral Nightly PRN    heparin (porcine) injection 5,000 Units  5,000 Units SubCUTAneous 3 times per day    hydrALAZINE (APRESOLINE) injection 10 mg  10 mg IntraVENous Q6H PRN    tacrolimus (PROGRAF) capsule 1 mg  1 mg Oral Daily with breakfast    tacrolimus (proGRAF) capsule 0.5 mg  0.5 mg Oral Nightly    carvedilol (COREG) tablet 12.5 mg  12.5 mg Oral BID WC    insulin lispro (HUMALOG,ADMELOG) injection vial 0-4 Units  0-4 Units SubCUTAneous 4x Daily AC & HS    sodium phosphate 15 mmol in sodium chloride 0.9 % 250 mL IVPB  15 mmol IntraVENous PRN    potassium chloride (KLOR-CON M) extended release tablet 40 mEq  40 mEq Oral PRN    Or    potassium bicarb-citric acid (EFFER-K) effervescent tablet 40 mEq  40 mEq Per G Tube PRN    Or    potassium chloride 10 mEq/100 mL IVPB (Peripheral Line)  10  Yana, DO

## 2024-07-17 NOTE — PROGRESS NOTE ADULT - PROBLEM SELECTOR PLAN 1
-test BG AC/HS/2AM  -Decrease Levemir to 4  units QHS  -c/w Humalog 2-2-3 w/meals for now. HOLD IF NOT EATING!!  -c/w Humalog low correction scale AC and Low HS scale  -the patient has type 1 diabetes and requires insulin with all meals.  If there is any question as to whether it should be given, please contact endocrinology.  Do not hold if the patient will be eating.   Discharge on basal/bolus w/outpt follow up w/Dr Pina Ley for CGM training  -Plan discussed with pt/team.  Contact info: [phone] (24/7). pager [phone]
17-Jul-2024 13:34
-test BG AC/HS  -Reduce Levemir 6 units QHS (fasting glucose under 100mg/dl this AM)  -c/w Humalog 3 units AC meals (do not hold if eating)  -c/w Humalog low correction scale AC and Low HS scale  he patient has type 1 diabetes and requires insulin with all meals.  If there is any question as to whether it should be given, please contact endocrinology.  Do not hold if the patient will be eating.   Discharge on basal/bolus w/outpt follow up w/Dr Pina Ley  discussed w/pt, RN and Medicine NP  pager: [phone]
- test BG AC/HS/2AM  -c/w Levemir 5 units QHS  -c/w Humalog 2-2-3 w/meals. Patient with variable po intake, thus will not adjust Humalog doses at this time  -c/w Humalog low correction scale AC and Low HS scale  - goal is to prevent hypoglycemia and severe hyperglycemia in this patient.  - the patient has type 1 diabetes and requires insulin with all meals. Do not hold if the patient will be eating.   Discharge on basal/bolus w/outpt follow up w/Dr Pina Ley for CGM training
- test BG AC/HS/2AM  -c/w Levemir 5 units QHS  -c/w Humalog 2-2-3 w/meals. Patient with variable po intake, thus will not adjust Humalog doses at this time  -c/w Humalog low correction scale AC and Low HS scale  - goal is to prevent hypoglycemia and severe hyperglycemia in this patient.  - the patient has type 1 diabetes and requires insulin with all meals. Do not hold if the patient will be eating.   Discharge on basal/bolus w/outpt follow up w/Dr Pina Ley for CGM training
-test BG AC/HS  -C/w Levemir 7 units QHS. DO NOT HOLD AS PT HAS T1DM  -c/w Humalog 3 units AC meals for now. Can give after eating if unsure if pt will eat  -c/w Humalog low correction scale AC and change to Low HS scale  the patient has type 1 diabetes and requires insulin with all meals.  If there is any question as to whether it should be given, please contact endocrinology.  Do not hold if the patient will be eating.   Discharge on basal/bolus w/outpt follow up w/Dr Pina Ley  Call with any questions: [phone] (cell)
-test BG AC/HS/2AM  Decrease Levemir 5 units QHS  Adjust Humalog 2-2-3 w/meals  -c/w Humalog low correction scale AC and Low HS scale  -the patient has type 1 diabetes and requires insulin with all meals.  If there is any question as to whether it should be given, please contact endocrinology.  Do not hold if the patient will be eating.   Discharge on basal/bolus w/outpt follow up w/Dr Pina Ley for CGM training  discussed w/pt, RN and Medicine NP  pager: [phone].
-test BG AC/HS/2AM (not done last night)  -Decrease Levemir to 3  units QHS  -c/w Humalog 2-2-3 w/meals. ADMINISTER AFTER EATING!!  -c/w Humalog low correction scale AC and Low HS scale  -the patient has type 1 diabetes and requires insulin with all meals.  If there is any question as to whether it should be given, please contact endocrinology.  Do not hold if the patient will be eating.   Discharge on basal/bolus w/outpt follow up w/Dr Pina Ley for CGM training  -Plan discussed with pt/team.  Contact info: [phone] (24/7). pager [phone]
-test BG AC/HS/2AM (not done)  -C/w Levemir to 3  units QHS  -c/w Humalog 2-2-3 w/meals. ADMINISTER AFTER EATING!!  -c/w Humalog low correction scale AC and Low HS scale  -the patient has type 1 diabetes and requires insulin with all meals.  If there is any question as to whether it should be given, please contact endocrinology.  Do not hold if the patient will be eating.   Discharge on Lantus 3 units q hs and Humalog 2-2-3 as noted above. Pt can change insulin doses depending on PO intake and instructions given regarding Lantus doses.  Pt has to make apt for follow up w/Dr Pina Ley for CGM training  -Plan discussed with pt/team.  Contact info: [phone] (24/7). pager [phone]

## 2024-07-17 NOTE — ED ADULT TRIAGE NOTE - ARRIVAL FROM
REFERRAL INFORMATION:  Referring Provider: Dr. Veronica Gonzales  Referring Clinic: [facility] - Primary [facility]  Reason for Visit/Diagnosis: Umbilical hernia without obstruction and without gangrene        FUTURE VISIT INFORMATION:  Appointment Date: 7/30/2024  Appointment Time: 11 AM     NOTES RECORD STATUS  DETAILS   OFFICE NOTE from Referring Provider Care Everywhere [facility]:  6/25/24, 5/21/24 - [facility] OV with Dr. Gonzales   OFFICE NOTE from Other Specialists N/A    HOSPITAL DISCHARGE SUMMARY/ ED VISITS  Care Everywhere [facility]:  6/18/24 - ED OV with Dr. Song   OPERATIVE REPORT N/A    PERTINENT LABS Care Everywhere / Internal    IMAGING (CT, MRI, US, XR)  Received [facility]:  6/18/24 - CT Abd/Pelvis     Records Requested    Facility  [facility]  Fax: [phone]   Outcome * 7/17/24 8:19 AM Faxed urgent request to [facility] for images to be pushed to [place] PACs. - [place]    * 7/18/24 11:23 AM Images received from [facility] and attached to the patient in PACs. - Keyana     
Home

## 2024-07-23 NOTE — ED ADULT NURSE NOTE - NS ED NOTE ABUSE RESPONSE YN
Decrease metoprolol to 50 mg daily  Decrease Maxzide to 0.5 tablets daily   Bring all your medications with you at your next appt   
no

## 2024-07-25 NOTE — PHYSICAL THERAPY INITIAL EVALUATION ADULT - PATIENT PROFILE REVIEW, REHAB EVAL
Focus Note    Patient breathing through contractions well upon entering the room. She is okay for a cervical check. Her cervix was noted to be 5/70/-2. Fetal head was well applied to the cervix. We discussed AROM at this time. The patient desires an epidural, and then is interested in AROM. Dr. Hardwick was contacted and updated with the plan.     Fetal Heart Tones:  Baseline: 130  Variability: moderate  Accelerations: present  Decelerations: absent  TOCO: Q2-3 minutes  Category: I and reactive     Assessment/Plan  Anesthesia contacted for epidural placement  Pitocin per protocol  CEFM  Plan to recheck following epidural     Sonia Tristan MD   PGY II, Obstetrics & Gynecology  07/24/24       yes

## 2024-08-20 NOTE — PATIENT PROFILE ADULT - PSYCHOSOCIAL CONCERNS
[FreeTextEntry1] : AE - repeat PET scan and repeat neurocog testing.  No further IVIG right now.  Follow up in 3 months. 
none

## 2024-08-25 NOTE — PRE-OP CHECKLIST - IV STARTED
Emergency Department Discharge Information for Yolanda Guerrero was seen in the Emergency Department today for abnormal head movements.  At this time there is not appear to be an emergent cause of Yolanda's head movements.  He had an EKG performed which does not show signs of arrhythmia or damage to the heart.    We recommend that you continue to monitor Yolanda at home for recurrences of abnormal head movements.  Follow-up with your primary pediatrician in the next 1 week for a reevaluation and consideration of neurology follow-up.      Return to the emergency department immediately with any prolonged abnormal head movements or head movements that are associated with change in mental status, loss of consciousness, severe headaches, dizziness, weakness, fevers or any other concerning symptoms.    Please make an appointment to follow up with his primary care provider or regular clinic in 7 days unless symptoms completely resolve.   
yes

## 2024-08-26 NOTE — ED ADULT NURSE NOTE - NS ED NURSE LEVEL OF CONSCIOUSNESS MENTAL STATUS
[FreeTextEntry1] : Murmur of aortic sclerosis and mitral regurgitation An echocardiogram was ordered to rule out a cardiomyopathy or valvular abnormality as the etiology of his murmur and abnormal ECG  Abnormal ECG Mr. MAXWELL HILL  was instructed to undergo treadmill nuclear stress testing to rule out myocardial ischemia as the etiology of his   abnormal ECG.  Preoperative cardiovascular evaluation: Shoulder surgery Evaluation as noted above  RTO after testing [EKG obtained to assist in diagnosis and management of assessed problem(s)] : EKG obtained to assist in diagnosis and management of assessed problem(s) Awake/Alert/Cooperative

## 2024-10-23 NOTE — DISCHARGE NOTE PROVIDER - HOSPITAL COURSE
65 y/o female with multiple medical issues including ESRD on hd, HTn, DM, recently COVID positive in May.    Presented to ER with sob and leg pain and a nonproductive cough.  Denied fevers or chills.     Was due for dialysis but did not go because she was not feeling well.    Admitted with fluid overload / pulmonary edema and shortness of breath.    Hemodialysis for fluid removal     Symptoms resolved
Hpi Title: Evaluation of Skin Lesions

## 2024-11-10 NOTE — ED PROVIDER NOTE - CROS ED RESP ALL NEG
[facility]  General Surgery Progress Note    Patient: Richard Magana Date of Service: 11/10/2024   MRN: [id number] Time: 10:51 AM   YOB: 1933 Length of Stay: 2 days     24h events:  Ambulated in [place] this morning. C/O pain with certain activity and movement. No pain at rest. Does not want to take narcotics, afraid of getting constipated. Only taking tylenol for pain. Had small BM yesterday. Tolerating diet.     Review of Systems:  Constitutional: Negative except as documented in history of present illness.  Eye: Negative except as documented in history of present illness.  Ear/Nose/Mouth/Throat: Negative except as documented in history of present illness.  Cardiovascular: Negative except as documented in history of present illness.  Respiratory: Negative except as documented in history of present illness.  Gastrointestinal: Negative except as documented in history of present illness.  Genitourinary: Negative except as documented in history of present illness.  Musculoskeletal: Negative except as documented in history of present illness.  Integumentary: Negative except as documented in history of present illness.  Hematology/Lymphatics: Negative except as documented in history of present illness.  Neurologic: Negative except as documented in history of present illness.  Endocrine: Negative except as documented in history of present illness.  Allergy/Immunologic: Negative except as documented in history of present illness.  Psychiatric: Negative except as documented in history of present illness.     Intake/Output:  Date 11/09/24 0700 - 11/10/24 0659 11/10/24 0700 - 11/11/24 0659   Shift 9046-0658 9725-3608 8637-2709 24 Hour Total 9380-1327 5288-9246 9113-8776 24 Hour Total   INTAKE   P.O.  300 350 650       I.V. 26   26       Shift Total 26 300 350 676       OUTPUT   Urine 500  100   100   Shift Total 500  100   100   Weight (kg) 77.3 77.3 78.3 78.3 78.3 78.3  78.3 78.3       Vitals:    Vital Last Value 24 Hour Range   Temperature 97.7 °F (36.5 °C) (11/10/24 0904) Temp  Min: 97.7 °F (36.5 °C)  Max: 98.2 °F (36.8 °C)   Pulse (!) 56 (11/10/24 0904) Pulse  Min: 56  Max: 71   Respiratory 16 (11/10/24 0904) Resp  Min: 16  Max: 16   Non-Invasive  Blood Pressure 122/73 (11/10/24 0904) BP  Min: 107/66  Max: 128/73   Pulse Oximetry 94 % (11/10/24 0904) SpO2  Min: 94 %  Max: 97 %   Arterial   Blood Pressure   No data recorded       Physical Exam:  General: No acute distress, awake, appears stated age  Head: Normocephalic, atraumatic  Eyes: Conjunctivae normal  ENT: No lymphadenopathy. Secretions thin  Neck: Supple, soft  Chest: No lesions, no ecchymosis, nontender  Lungs: Clear breath sounds, unlabored breathing, no wheeze.  Heart: Sinus rhythm, regular rate, no murmur  Abdomen: Non distended. Soft. Port incisions with derma bond, no signs of infection. Tenderness to Right abdomen over previous hernia, no rebounding, rigidity or guarding.   Genitourinary: normal anatomy  Extremities: Warm and well perfused, no swelling  Neurologic: A&Ox3, GCS 15, no gross deficits.  Psychiatric: normal affect and mood      Labs:  Recent Labs   Lab 11/10/24  0714   WBC 6.8   RBC 4.22*   HGB 13.8   HCT 41.2        Recent Labs   Lab 11/10/24  0714 11/09/24  1203 11/09/24  0634 11/08/24  1434   SODIUM 141  --  138 137   POTASSIUM 3.9 4.2 4.4 3.7   CHLORIDE 109  --  109 108   CO2 27  --  25 22   BUN 15  --  14 14   CREATININE 0.84  --  0.82 0.71   GLUCOSE 109*  --  144* 115*   CALCIUM 8.6  --  8.7 9.6     Recent Labs   Lab 11/10/24  0714 11/09/24  1203 11/09/24  0634   SODIUM 141  --  138   POTASSIUM 3.9   < > 4.4   CHLORIDE 109  --  109   CO2 27  --  25   BUN 15  --  14   CREATININE 0.84  --  0.82   CALCIUM 8.6  --  8.7   ALBUMIN  --   --  3.2*   BILIRUBIN  --   --  0.6   ALKPT  --   --  66   GPT  --   --  23   AST  --   --  15   GLUCOSE 109*  --  144*    < > = values in this interval not  displayed.     Microbiology Results       None              Radiology:  No results found.      Assessment/Plan:  89 year old year-old male admitted s/p robotic assisted repair of right spigelian hernia with mesh.      - Regular diet.   - Recommend out of bed and ambulating QID. IS. Cubii per protocol.   - Pain management: Scheduled tylenol. Will add tizanidine for pain management.   - DVT prophylaxis: Lovenox  - If pain controlled this afternoon can discharge from surgical standpoint. Post operative instructions and ER warning signs discussed. Follow up with Dr. Pimentel in 2 weeks.        - - -

## 2024-11-12 NOTE — ED ADULT NURSE NOTE - PMH
86
ACS (acute coronary syndrome)  1/2015 - cath revealed 100% ostial stenosis not amenable to PCI - medical management  CAD (coronary artery disease)  s/p stents  CHF (congestive heart failure)  EF 40-45%  CVA (cerebral infarction)  with no residual, 8 yrs ago, prior to heart surgery - ST memory loss  Diabetes mellitus type 1  Insulin Dependent - Medtronic  Minimed Paradigm Insulin Pump - Novolog  DKA, type 1  1/2015  ESRD (end stage renal disease)  dialysis  M, tue, thursday, saturday  Gout  past  Hyperlipidemia    Peripheral vascular disease  occluded left fem-pop bypass 5/2015  Subclavian vein stenosis, left  s/p stent  TIA (transient ischemic attack)  x 2 - 8-9 years ago prior to ASD/VSD repair

## 2024-12-03 NOTE — PATIENT PROFILE ADULT. - NS PRO TALK SOMEONE YN
[FreeTextEntry1] : f/u spot on the lower leg [de-identified] : 89F with a hx of BCC, most recently of the L lower leg s/p Mohs (9/2024), here for  1) poor wound healing on the L lower leg after Mohs. Not using vaseline 2) itchy rash on the L posterior leg 3) red bump on the R nasal ala x months. Improving unable to assess

## 2024-12-05 NOTE — PATIENT PROFILE ADULT - NSPROGENANESREACTION_GEN_A_NUR
no previous reaction
Post-Care Instructions: I reviewed with the patient in detail post-care instructions. Patient is to wear sunprotection, and avoid picking at any of the treated lesions. Pt may apply Vaseline to crusted or scabbing areas.
Render Post-Care Instructions In Note?: no
Number Of Freeze-Thaw Cycles: 2 freeze-thaw cycles
Detail Level: Simple
Show Aperture Variable?: Yes
Consent: The patient's consent was obtained including but not limited to risks of crusting, scabbing, blistering, scarring, darker or lighter pigmentary change, recurrence, incomplete removal and infection.
Duration Of Freeze Thaw-Cycle (Seconds): 2

## 2024-12-13 NOTE — PHYSICAL THERAPY INITIAL EVALUATION ADULT - DISCHARGE DISPOSITION, PT EVAL
Problem: Skin Injury Risk Increased  Goal: Skin Health and Integrity  Outcome: Progressing     Problem: Comorbidity Management  Goal: Blood Glucose Levels Within Targeted Range  Outcome: Progressing     Problem: Comorbidity Management  Goal: Maintenance of Heart Failure Symptom Control  Outcome: Progressing     Problem: Gas Exchange Impaired  Goal: Optimal Gas Exchange  Outcome: Progressing     Problem: Wound  Goal: Optimal Wound Healing  Outcome: Progressing   Goal Outcome Evaluation:    Prn melatonin given for sleep. Slept intermittently between cares. Desats when asleep. Oxygen 3L through oxy mask, received iv lasix. Able to void through external catheter. Wound vac on RLE intact. Wearing prafo boot on RLE. Sinus rhythm with BBB on cardiac monitor.    no skilled PT needs/Patient is cleared for D/C home from physical therapy standpoint. Patient is agreeable, RN Ebonie aware.

## 2024-12-17 NOTE — ED ADULT NURSE NOTE - CAS DISCH ACCOMP BY
Pt had stent removed yesterday and said that she has gotten sick 3 times and the Tramadol is not working for her   
Self

## 2024-12-26 NOTE — DISCHARGE NOTE PROVIDER - INSTRUCTIONS
Population Health Chart Review & Patient Outreach Details      Additional [facility] Notes:               Updates Requested / Reviewed:      Updated Care Coordination Note         Health Maintenance Topics Overdue:      VB Score: 1     Colon Cancer Screening    Pneumonia Vaccine  Tetanus Vaccine  Shingles/Zoster Vaccine  RSV Vaccine                  Health Maintenance Topic(s) Outreach Outcomes & Actions Taken:    Colorectal Cancer Screening - Outreach Outcomes & Actions Taken  : Reminded Patient to Complete Already Received Home Test   Diabetic diet

## 2025-01-09 NOTE — PATIENT PROFILE ADULT - LEGAL HELP
See Flowsheet for Immunotherapy administration.   Patient waited in clinic 30 mins for observation.  Patient had Epi Pen on hand.   General no

## 2025-01-13 NOTE — PATIENT PROFILE ADULT - NSPROMEDSPATCH_GEN_A_NUR
Assessment:   S/P Orif Intra-articular Distal Radius Fracture - Left on 11/15/2024. The patient has done well since surgery. Her pain has been well controlled since surgery.  The fracture is well-maintained on imaging today.  She has decent wrist range of motion today which is painless.  I did discuss with her again that in her specific case I had to place the plate a little more distal than I normally do for fixation and as such her flexor tendons are more at risk for irritation.  At this time she has no signs of flexor irritation which were careful to look for on examination.  I explained to her what the signs would look like and that she should look out for them.  If she develops them she will come in and we will come up with the plan.  But if she does develop flexor irritation she will likely need to undergo plate removal.  At this time she does not have any and the plate can be maintained.  1 thing she does have is some numbness in the palm.  After surgery the patient did complain of decreased sensation in the median nerve distribution.  She had a block that lasted for 72 hours and was slow to wean off and it was difficult to tell if she had carpal tunnel syndrome after the surgery.  She did have a full return of sensation to the tips of the fingers and on examination today has full sensation in this area.  However she does report some decrease sensation in the palm which is more in the palmar cutaneous branch of the median nerve.  It is possible she has a neuropraxia from retraction during the surgery.  This has improved since I last saw her in sensation and as such I am hopeful that we will continue to improve.  We will continue to watch this.     All questions answered.  Patient expressed understanding with treatment plan.    Plan:   Range of motion as tolerated  Demonstrated range of motion exercises today which she is encouraged to do at home on her own.  Range of motion as tolerated.  She should do range  "of motion exercises at least once an hour.  Discussed potential referral to physical therapy but the patient refused at this time.  She can be weightbearing as tolerated this time and is encouraged to resume her normal activities as tolerated, using pain as her guide  Tylenol and Motrin for pain    Follow Up:  Return to clinic in 4 weeks with new x-rays at that time      CHIEF COMPLAINT:  Post op follow up      SUBJECTIVE:  Maricruz Alfaro is a 67 y.o. female who presents for follow up after Orif Intra-articular Distal Radius Fracture - Left on 11/15/2024. Pain is well controlled on the current regimen. No issues with the post operative dressing. No fever or chills. She does note tingling in the palm of her hand. She is completing her home exercise.       PHYSICAL EXAMINATION:  Vital signs: Pulse 79   Temp 98.4 °F (36.9 °C) (Temporal)   Resp 16   Ht 5' 6\" (1.676 m)   Wt 68 kg (150 lb)   SpO2 98%   BMI 24.21 kg/m²   General: well developed and well nourished, alert, oriented times 3, and appears comfortable  Psychiatric: Normal    MUSCULOSKELETAL EXAMINATION:  Incision: Clean, dry, intact - sutures removed  Range of Motion: As expected and wrist flexion extension to 30 and 30, no pain with wrist range of motion  Neurovascular status: Neuro intact, good cap refill.  She does report decree sensation specifically in the palm more or less in the distribution of the palmar cutaneous nerve of the median nerve.  She has a pins and needle sensation in this distribution indicating a likely neuropraxia      STUDIES REVIEWED:  X-Ray of left wrist obtained on 1/13/2025 were reviewed and demonstrate  distal radius fracture in near-anatomic alignment with hardware in place without complication .        Scribe Attestation      I,:  Raissa Zavala am acting as a scribe while in the presence of the attending physician.:       I,:  Chacho Wong MD personally performed the services described in this documentation    as scribed in " my presence.:              none

## 2025-01-20 NOTE — H&P ADULT - PSYCHIATRIC DETAILS
Rx sent with refill for prednisolone eye drop-- h/o cornea transplants.    I called mobile/home times three and left message Rx for prednisolone sent with refill and reviewed due for follow up kevon and to call [phone] option 1 for scheduling future appointment with Dr. Roche.    Chava Ramirez RN 10:11 AM 01/20/25     normal behavior/normal affect

## 2025-01-23 NOTE — ED ADULT NURSE NOTE - NS ED NURSE LEVEL OF CONSCIOUSNESS SPEECH
Speaking Coherently
intermittent palpitations and OVALLES associated with Afib , last episode was 1 month ago/palpitations/dyspnea on exertion

## 2025-04-18 NOTE — CONSULT NOTE ADULT - CONSULT REQUESTED BY NAME
Rudy Brasher Detail Level: Detailed Depth Of Biopsy: dermis Was A Bandage Applied: Yes Size Of Lesion In Cm: 0 Biopsy Type: H and E Biopsy Method: Dermablade Anesthesia Type: 1% lidocaine with epinephrine Anesthesia Volume In Cc: 0.5 Hemostasis: Drysol Wound Care: Petrolatum Dressing: bandage Destruction After The Procedure: No Type Of Destruction Used: Curettage Curettage Text: The wound bed was treated with curettage after the biopsy was performed. Cryotherapy Text: The wound bed was treated with cryotherapy after the biopsy was performed. Electrodesiccation Text: The wound bed was treated with electrodesiccation after the biopsy was performed. Electrodesiccation And Curettage Text: The wound bed was treated with electrodesiccation and curettage after the biopsy was performed. Silver Nitrate Text: The wound bed was treated with silver nitrate after the biopsy was performed. Lab: [id number] Lab Facility: [id number] Medical Necessity Information: It is in your best interest to select a reason for this procedure from the list below. All of these items fulfill various CMS LCD requirements except the new and changing color options. Consent: Written consent was obtained and risks were reviewed including but not limited to scarring, infection, bleeding, scabbing, incomplete removal, nerve damage and allergy to anesthesia. Post-Care Instructions: I reviewed with the patient in detail post-care instructions. Patient is to keep the biopsy site dry overnight, and then apply bacitracin twice daily until healed. Patient may apply hydrogen peroxide soaks to remove any crusting. Notification Instructions: Patient will be notified of biopsy results. However, patient instructed to call the office if not contacted within 2 weeks. Billing Type: Third-Party Bill Information: Selecting Yes will display possible errors in your note based on the variables you have selected. This validation is only offered as a suggestion for you. PLEASE NOTE THAT THE VALIDATION TEXT WILL BE REMOVED WHEN YOU FINALIZE YOUR NOTE. IF YOU WANT TO FAX A PRELIMINARY NOTE YOU WILL NEED TO TOGGLE THIS TO 'NO' IF YOU DO NOT WANT IT IN YOUR FAXED NOTE.

## 2025-04-24 NOTE — ED ADULT NURSE NOTE - ED STAT RN HANDOFF DETAILS
dentified pt with two pt identifiers(name and ).    Chief Complaint   Patient presents with    Medicare AWV     Patient here a Medicare Wellness visit.        Health Maintenance Due   Topic    COVID-19 Vaccine ( season)    A1C test (Diabetic or Prediabetic)        Wt Readings from Last 3 Encounters:   25 94.3 kg (207 lb 12.8 oz)   25 89.8 kg (198 lb)   25 88.5 kg (195 lb)     Temp Readings from Last 3 Encounters:   25 97.8 °F (36.6 °C) (Temporal)   25 98.4 °F (36.9 °C) (Oral)   24 97.9 °F (36.6 °C) (Temporal)     BP Readings from Last 3 Encounters:   25 128/73   25 (!) 156/76   24 136/72     Pulse Readings from Last 3 Encounters:   25 70   25 74   24 68           Coordination of Care Questionnaire:  :   1. \"Have you been to the ER, urgent care clinic since your last visit?  Hospitalized since your last visit?\" no    2. \"Have you seen or consulted any other health care providers outside of the [facility] since your last visit?\" no     3. For patients aged 45-75: Has the patient had a colonoscopy / FIT/ Cologuard?       If the patient is female:    4. For patients aged 40-74: Has the patient had a mammogram within the past 2 years? Yes 25      5. For patients aged 21-65: Has the patient had a pap smear? no     3) Do you have an Advance Directive on file? no  Are you interested in receiving information about Advance Directives? no    Patient is accompanied by self I have received verbal consent from Carline Mcclain to discuss any/all medical information while they are present in the room.    report given to KRISTINE Coats

## 2025-05-01 NOTE — ED ADULT NURSE NOTE - PAIN: PRESENCE, MLM
SURGERY SCHEDULING REQUIREMENTS INCLUDE:     Facility:[facility]/ [facility]/ [facility]     Admission Type: Day Surgery   Special Instructions: Under Fluroscopy  Time Needed: 20 minutes       Anesthesia: Local     Special Equipment: None       Procedure: : (20917) Sacroiliac Joint Injection   Levels and laterality Bilateral SI joint injection      Dx Code: M46.1     BLOOD THINNER:   Is patient on any blood thinners?  None   Instructed to hold? No      Is patient on any Aspirin/Excedrin? ASA 81 MG   Instructed to hold? No      Is patient on any NSAIDS? None   Instructed to hold? No      Is patient on any weight loss medications?  None   Instructed to hold? No      *If no hold instructions for meds above given to patient, please route message to nurse pool to give instructions*     Last platelet count:       PLT (K/mcL)   Date Value   04/04/2025 294         Diabetic: No      BMI Estimated body mass index is 30.27 kg/m² as calculated from the following:    Height as of this encounter: 5' (1.524 m).    Weight as of this encounter: 70.3 kg (155 lb). (if greater than 50 cannot be at [place] or [address] [place] [address])     Current Pain Score: 1/10  Pain level at best: 1/10  Pain level at worst: 4/10  Pain level at rest: 1/10  Pain level with activity: 5/10   complains of pain/discomfort

## 2025-05-03 NOTE — H&P ADULT - NSHPLABSRESULTS_GEN_ALL_CORE
12.0   5.8   )-----------( 252      ( 10 Jul 2019 22:21 )             36.4       07-11    135  |  92<L>  |  50<H>  ----------------------------<  282<H>  5.4<H>   |  24  |  5.71<H>    Ca    9.3      11 Jul 2019 07:12  Mg     2.6     07-11    TPro  6.4  /  Alb  3.8  /  TBili  0.3  /  DBili  x   /  AST  26  /  ALT  17  /  AlkPhos  64  07-11                  PT/INR - ( 10 Jul 2019 22:21 )   PT: 11.2 sec;   INR: 0.97 ratio         PTT - ( 10 Jul 2019 22:21 )  PTT:30.5 sec    Lactate Trend            CAPILLARY BLOOD GLUCOSE      POCT Blood Glucose.: 251 mg/dL (11 Jul 2019 02:27)        < from: Xray Chest 1 View AP/PA (07.10.19 @ 22:30) >    INTERPRETATION:  No focal consolidations.    < end of copied text >    EKG SR NSST/T - consider ICM vs sick sinus syndrome vs. amyloid getting worked up for gammopathy outpatient, hx. CKD)  - EKG w/o ST changes, T-wave changes, BBB and troponins wnl. LHC 5/2 non obstructive   - Troponin 13 (wnl), BNP elevated 982  - TTE 5/1 --> EF 69%, NWMA.  does not comment on diastolic function  - TSH 3.58    Plan  - EP plan ILR after cMRI   - f/u lyme serology, t.cruzi neg  - f/u serum electrophoresis, immunofixation to evaluate for gammopathy - consider ICM vs sick sinus syndrome vs. amyloid getting worked up for gammopathy outpatient, hx. CKD)  - EKG w/o ST changes, T-wave changes, BBB and troponins wnl. LHC 5/2 non obstructive   - Troponin 13, BNP elevated 982  - TTE 5/1 --> EF 69%, no RWMA    Plan  - EP plan ILR after cMRI   - f/u lyme serology, t.cruzi neg  - f/u serum electrophoresis, immunofixation to evaluate for gammopathy

## 2025-05-09 NOTE — PATIENT PROFILE ADULT - NSPROSPHOSPCHAPLAINYN_GEN_A_NUR
What Type Of Note Output Would You Prefer (Optional)?: Bullet Format
Is This A New Presentation, Or A Follow-Up?: Follow Up Acne
Additional Comments (Use Complete Sentences): Pt stated she never filled the tretinoin prescription we sent her in February.
no

## 2025-05-21 NOTE — ED ADULT NURSE NOTE - NS ED NURSE LEVEL OF CONSCIOUSNESS SPEECH
Continued Stay SW/CM Assessment/Plan of Care Note       Active Substitute Decision Maker (SDM)    There are no active Substitute Decision Maker (SDM) on file.       Progress note:    Pt chart reviewed and plan discussed during care rounds. Pt scheduled for AV fistula creation tomorrow.     Pt has been active with HD at  [facility]- resumption sent. Pt prefers to switch to PD. Nephrology discussing options with vascular. Will continue to follow for dc planning.     See SW/CM flowsheets for other objective data.    Barriers to Discharge  Identified Barriers to Discharge/Transition Planning: Consult Pending/Clearance, Medical necessity for acute care                   Speaking Coherently

## 2025-05-22 NOTE — CONSULT NOTE ADULT - ASSESSMENT
62 yo F with ESRD (on HD M/Tu/Th/Sa), type 1 DM, CAD, HFrEF (EF 50% on TTE from 10/18), TIA, and PVD, who presented from HD with complaints of N/V and hyperglycemia. In the ED, patient found to be tachypneic (20), and hypotensive (as low as 70s/40s). Labs significant for leukocytopenia (WBC 3), hyperglycemia (269), elevated trops 201, with BHB of 6.3. s/p  500cc NS bolus. pt with recurrent dka    1- esrd  2- dka  3- pad      hd consent obtained witnessed and placed in chart  hd in am   insulin drip to cont   to add renvela one tab with meals for shpt
63 f with  DKA- resolving. Endocrine follow re insulin pump.  ESRD- continue HD. Nephrology follow.  Observe off antibiotics  CAD stable.  Anxiety control  Pierce Viera MD pager [phone]
MAC
63 year old woman with uncontrolled DM1 A1C 8.6 c/b ESRD on HD, neuropathy, retinopathy, CAD, CHF, PVD, TIA here with fluid overload and DKA in setting of running out of insulin.
64 yo F with ESRD (on HD M/Tu/Th/Sa), type 1 DM, CAD, HFrEF (EF 50% on TTE from 10/18), TIA, and PVD, who presented from HD with complaints of N/V and hyperglycemia as well as shortness of breath. She was initially treated for DKA and has improved. She is now being evaluated by pulmonary for an abnormal CT chest concerning for possible underlying malignancy.

## 2025-06-02 NOTE — H&P ADULT - PROBLEM SELECTOR PROBLEM 3
Walk in to ED with body aches since apprx 5/6m, had injection for osteoporosis on 4/29 and developed body aches 1 week later. Denies sick contacts or flu like symptoms.   
Type 1 diabetes mellitus with diabetic peripheral angiopathy without gangrene

## 2025-06-05 NOTE — PATIENT PROFILE ADULT - ANY IMPAIRED UPPER EXTREMITY FUNCTION WITHIN A WEEK PRIOR TO ADMISSION RELATED TO?
"Subjective   Patient ID: Fara Morales is a 72 y.o. female who presents for Pre-op Exam (Right total knee arthroplasty 07/18/2025 Dr Herbert).  She is able to walk up a flight of steps without being winded. MET of at least 5.        In addition to that documented in the HPI above, the additional ROS was obtained:  Constitutional: Denies fevers or chills  Eyes: Denies vision changes  ENMT: Denies trouble swallowing  Cardiovascular: Denies chest pain or heart racing  Respiratory: Denies SOB or cough  Gastrointestinal: Denies nausea, vomiting, diarrhea or constipation      Patient Care Team:  Rebecca Johnson DO as PCP - General  Donta Torre MD as Consulting Physician (Neurology)  Mark Herbert as Consulting Physician (Orthopaedic Surgery)    Current Medications[1]    Objective     Visit Vitals  /78 (BP Location: Right arm, Patient Position: Sitting, BP Cuff Size: Adult)   Pulse 68   Temp 36 °C (96.8 °F) (Temporal)   Resp 20   Ht 1.575 m (5' 2\")   Wt 75.3 kg (166 lb)   SpO2 99%   BMI 30.36 kg/m²   OB Status Postmenopausal   Smoking Status Never   BSA 1.82 m²        Constitutional: Well nourished, well developed, appears stated age  Eyes: no scleral icterus, no conjunctival injection  Ears: normal external auditory canal, no retraction or bulging of tympanic membranes  Neck: no thyromegaly  Cardiovascular: regular rate and rhythm, no leg edema  Respiratory: normal respiratory effort, clear to auscultation bilaterally  Skin: Warm, dry. No rashes  Neurologic: Alert, CNs II-XII grossly intact..  Psych: Appropriate mood and affect.        Assessment & Plan  Primary osteoarthritis of both knees  Plans on having surgery of both knees  Currently scheduled for right total knee arthroplasty on 7/18  Pre admission testing is scheduled for 7/8  Medically cleared for surgery       Pre-op evaluation  Plans on having surgery of both knees  Currently scheduled for right total knee arthroplasty on 7/18  Pre admission testing is " scheduled for 7/8  Medically cleared for surgery       Other screening mammogram    Orders:    BI mammo bilateral screening; Future    Hyperlipemia, mixed    Orders:    Lipid Panel; Future    Comprehensive Metabolic Panel; Future    rosuvastatin (Crestor) 20 mg tablet; Take 0.5 tablets (10 mg) by mouth once daily.    Abnormal blood chemistry    Orders:    Hemoglobin A1C; Future    Benign essential hypertension  Controlled, continue losartan  Orders:    Comprehensive Metabolic Panel; Future    CBC; Future    losartan (Cozaar) 25 mg tablet; Take 1 tablet (25 mg) by mouth once daily.    Gastroesophageal reflux disease, unspecified whether esophagitis present  Controlled on omeprazole prn  Orders:    omeprazole (PriLOSEC) 20 mg DR capsule; Take 1 capsule (20 mg) by mouth once daily.         Follow up yearly and prn. Discussed colonoscopy. She will call when ready for referral. Had seen Dr. Prasad in the past.     Rebecca Johnson DO         [1]   Current Outpatient Medications   Medication Sig Dispense Refill    Ajovy Autoinjector 225 mg/1.5 mL auto-injector Inject 1 Pen (225 mg) under the skin every 30 (thirty) days.      cholecalciferol (Vitamin D-3) 25 MCG (1000 UT) capsule Take 1 capsule (25 mcg) by mouth once daily.      coQ10, ubiquinol, 100 mg capsule Take by mouth.      multivitamin (Multiple Vitamins) tablet Take 1 tablet by mouth once daily.      losartan (Cozaar) 25 mg tablet Take 1 tablet (25 mg) by mouth once daily. 90 tablet 3    omeprazole (PriLOSEC) 20 mg DR capsule Take 1 capsule (20 mg) by mouth once daily. 90 capsule 3    rosuvastatin (Crestor) 20 mg tablet Take 0.5 tablets (10 mg) by mouth once daily. 45 tablet 3     No current facility-administered medications for this visit.      no